# Patient Record
Sex: FEMALE | Race: WHITE | URBAN - NONMETROPOLITAN AREA
[De-identification: names, ages, dates, MRNs, and addresses within clinical notes are randomized per-mention and may not be internally consistent; named-entity substitution may affect disease eponyms.]

---

## 2017-01-20 ENCOUNTER — DOCTOR'S OFFICE (OUTPATIENT)
Dept: URBAN - NONMETROPOLITAN AREA CLINIC 1 | Facility: CLINIC | Age: 42
Setting detail: OPHTHALMOLOGY
End: 2017-01-20
Payer: COMMERCIAL

## 2017-01-20 DIAGNOSIS — Z96.1: ICD-10-CM

## 2017-01-20 PROCEDURE — DISABILITY BUREAU OF DISABILITY REPORT: Performed by: OPHTHALMOLOGY

## 2017-01-30 ENCOUNTER — DOCTOR'S OFFICE (OUTPATIENT)
Dept: URBAN - NONMETROPOLITAN AREA CLINIC 1 | Facility: CLINIC | Age: 42
Setting detail: OPHTHALMOLOGY
End: 2017-01-30

## 2017-01-30 DIAGNOSIS — Z96.1: ICD-10-CM

## 2017-01-30 PROCEDURE — DISABILITY BUREAU OF DISABILITY REPORT: Performed by: OPHTHALMOLOGY

## 2017-02-08 ENCOUNTER — ALLSCRIPTS OFFICE VISIT (OUTPATIENT)
Dept: FAMILY MEDICINE CLINIC | Facility: CLINIC | Age: 42
End: 2017-02-08
Payer: COMMERCIAL

## 2017-02-08 DIAGNOSIS — K52.9 NONINFECTIVE GASTROENTERITIS AND COLITIS: ICD-10-CM

## 2017-02-08 DIAGNOSIS — E03.9 HYPOTHYROIDISM: ICD-10-CM

## 2017-02-08 DIAGNOSIS — K50.10 CROHN'S DISEASE OF LARGE INTESTINE WITHOUT COMPLICATION (HCC): ICD-10-CM

## 2017-02-08 PROCEDURE — 99213 OFFICE O/P EST LOW 20 MIN: CPT | Performed by: FAMILY MEDICINE

## 2017-02-22 ENCOUNTER — TRANSCRIBE ORDERS (OUTPATIENT)
Dept: LAB | Facility: MEDICAL CENTER | Age: 42
End: 2017-02-22

## 2017-02-22 ENCOUNTER — APPOINTMENT (OUTPATIENT)
Dept: LAB | Facility: MEDICAL CENTER | Age: 42
End: 2017-02-22
Payer: COMMERCIAL

## 2017-02-22 DIAGNOSIS — E03.9 HYPOTHYROIDISM: ICD-10-CM

## 2017-02-22 DIAGNOSIS — K50.10 CROHN'S DISEASE OF LARGE INTESTINE WITHOUT COMPLICATION (HCC): ICD-10-CM

## 2017-02-22 DIAGNOSIS — K52.9 NONINFECTIVE GASTROENTERITIS AND COLITIS: ICD-10-CM

## 2017-02-22 LAB
25(OH)D3 SERPL-MCNC: 19.8 NG/ML (ref 30–100)
ALBUMIN SERPL BCP-MCNC: 4.1 G/DL (ref 3.5–5)
ALP SERPL-CCNC: 64 U/L (ref 46–116)
ALT SERPL W P-5'-P-CCNC: 40 U/L (ref 12–78)
ANION GAP SERPL CALCULATED.3IONS-SCNC: 7 MMOL/L (ref 4–13)
AST SERPL W P-5'-P-CCNC: 20 U/L (ref 5–45)
BASOPHILS # BLD AUTO: 0.04 THOUSANDS/ΜL (ref 0–0.1)
BASOPHILS NFR BLD AUTO: 1 % (ref 0–1)
BILIRUB SERPL-MCNC: 0.2 MG/DL (ref 0.2–1)
BUN SERPL-MCNC: 14 MG/DL (ref 5–25)
CALCIUM SERPL-MCNC: 9.9 MG/DL (ref 8.3–10.1)
CHLORIDE SERPL-SCNC: 103 MMOL/L (ref 100–108)
CO2 SERPL-SCNC: 28 MMOL/L (ref 21–32)
CREAT SERPL-MCNC: 0.68 MG/DL (ref 0.6–1.3)
EOSINOPHIL # BLD AUTO: 0.03 THOUSAND/ΜL (ref 0–0.61)
EOSINOPHIL NFR BLD AUTO: 0 % (ref 0–6)
ERYTHROCYTE [DISTWIDTH] IN BLOOD BY AUTOMATED COUNT: 12.6 % (ref 11.6–15.1)
GFR SERPL CREATININE-BSD FRML MDRD: >60 ML/MIN/1.73SQ M
GLUCOSE SERPL-MCNC: 89 MG/DL (ref 65–140)
HCT VFR BLD AUTO: 44.5 % (ref 34.8–46.1)
HGB BLD-MCNC: 15.1 G/DL (ref 11.5–15.4)
IRON SERPL-MCNC: 47 UG/DL (ref 50–170)
LYMPHOCYTES # BLD AUTO: 1.52 THOUSANDS/ΜL (ref 0.6–4.47)
LYMPHOCYTES NFR BLD AUTO: 21 % (ref 14–44)
MCH RBC QN AUTO: 32.1 PG (ref 26.8–34.3)
MCHC RBC AUTO-ENTMCNC: 33.9 G/DL (ref 31.4–37.4)
MCV RBC AUTO: 95 FL (ref 82–98)
MONOCYTES # BLD AUTO: 0.53 THOUSAND/ΜL (ref 0.17–1.22)
MONOCYTES NFR BLD AUTO: 7 % (ref 4–12)
NEUTROPHILS # BLD AUTO: 5.07 THOUSANDS/ΜL (ref 1.85–7.62)
NEUTS SEG NFR BLD AUTO: 71 % (ref 43–75)
NRBC BLD AUTO-RTO: 0 /100 WBCS
PLATELET # BLD AUTO: 367 THOUSANDS/UL (ref 149–390)
PMV BLD AUTO: 10.1 FL (ref 8.9–12.7)
POTASSIUM SERPL-SCNC: 4.5 MMOL/L (ref 3.5–5.3)
PROT SERPL-MCNC: 8 G/DL (ref 6.4–8.2)
RBC # BLD AUTO: 4.71 MILLION/UL (ref 3.81–5.12)
SODIUM SERPL-SCNC: 138 MMOL/L (ref 136–145)
T4 FREE SERPL-MCNC: 1.07 NG/DL (ref 0.76–1.46)
TSH SERPL DL<=0.05 MIU/L-ACNC: 0.07 UIU/ML (ref 0.36–3.74)
VIT B12 SERPL-MCNC: 451 PG/ML (ref 100–900)
WBC # BLD AUTO: 7.21 THOUSAND/UL (ref 4.31–10.16)

## 2017-02-22 PROCEDURE — 80053 COMPREHEN METABOLIC PANEL: CPT

## 2017-02-22 PROCEDURE — 82306 VITAMIN D 25 HYDROXY: CPT

## 2017-02-22 PROCEDURE — 83540 ASSAY OF IRON: CPT

## 2017-02-22 PROCEDURE — 84443 ASSAY THYROID STIM HORMONE: CPT

## 2017-02-22 PROCEDURE — 36415 COLL VENOUS BLD VENIPUNCTURE: CPT

## 2017-02-22 PROCEDURE — 82607 VITAMIN B-12: CPT

## 2017-02-22 PROCEDURE — 84439 ASSAY OF FREE THYROXINE: CPT

## 2017-02-22 PROCEDURE — 85025 COMPLETE CBC W/AUTO DIFF WBC: CPT

## 2017-03-20 ENCOUNTER — GENERIC CONVERSION - ENCOUNTER (OUTPATIENT)
Dept: OTHER | Facility: OTHER | Age: 42
End: 2017-03-20

## 2017-04-25 ENCOUNTER — DOCTOR'S OFFICE (OUTPATIENT)
Dept: URBAN - NONMETROPOLITAN AREA CLINIC 1 | Facility: CLINIC | Age: 42
Setting detail: OPHTHALMOLOGY
End: 2017-04-25
Payer: COMMERCIAL

## 2017-04-25 DIAGNOSIS — H25.13: ICD-10-CM

## 2017-04-25 DIAGNOSIS — H46.8: ICD-10-CM

## 2017-04-25 PROCEDURE — 92133 CPTRZD OPH DX IMG PST SGM ON: CPT | Performed by: OPHTHALMOLOGY

## 2017-04-25 PROCEDURE — 92014 COMPRE OPH EXAM EST PT 1/>: CPT | Performed by: OPHTHALMOLOGY

## 2017-04-25 ASSESSMENT — REFRACTION_MANIFEST
OS_VA3: 20/
OD_VA1: 20/
OD_VA2: 20/
OS_VA2: 20/
OU_VA: 20/
OD_VA3: 20/
OS_VA2: 20/
OS_VA3: 20/
OS_VA3: 20/
OU_VA: 20/
OD_VA1: 20/
OD_VA2: 20/
OD_VA2: 20/
OD_VA1: 20/
OD_VA3: 20/
OS_VA1: 20/
OS_VA1: 20/
OD_VA3: 20/
OU_VA: 20/
OS_VA1: 20/
OS_VA2: 20/

## 2017-04-25 ASSESSMENT — CONFRONTATIONAL VISUAL FIELD TEST (CVF)
OS_FINDINGS: FULL
OD_FINDINGS: FULL

## 2017-04-25 ASSESSMENT — VISUAL ACUITY
OD_BCVA: 20/20
OS_BCVA: 20/20

## 2017-04-25 ASSESSMENT — REFRACTION_CURRENTRX
OD_OVR_VA: 20/
OS_OVR_VA: 20/
OS_OVR_VA: 20/
OD_OVR_VA: 20/
OS_OVR_VA: 20/
OD_OVR_VA: 20/

## 2017-05-03 ENCOUNTER — GENERIC CONVERSION - ENCOUNTER (OUTPATIENT)
Dept: OTHER | Facility: OTHER | Age: 42
End: 2017-05-03

## 2017-05-10 RX ORDER — LIOTHYRONINE SODIUM 5 UG/1
5 TABLET ORAL 2 TIMES DAILY
Status: ON HOLD | COMMUNITY
End: 2019-02-12 | Stop reason: ALTCHOICE

## 2017-05-10 RX ORDER — LORAZEPAM 1 MG/1
1 TABLET ORAL
COMMUNITY
End: 2018-02-12 | Stop reason: ALTCHOICE

## 2017-05-10 RX ORDER — DIPHENHYDRAMINE HCL 25 MG
25 CAPSULE ORAL EVERY 4 HOURS PRN
Status: ON HOLD | COMMUNITY
End: 2017-12-12

## 2017-05-10 RX ORDER — MESALAMINE 800 MG/1
800 TABLET, DELAYED RELEASE ORAL 3 TIMES DAILY
Status: ON HOLD | COMMUNITY
End: 2017-05-12 | Stop reason: ALTCHOICE

## 2017-05-11 ENCOUNTER — ANESTHESIA EVENT (OUTPATIENT)
Dept: PERIOP | Facility: HOSPITAL | Age: 42
End: 2017-05-11
Payer: COMMERCIAL

## 2017-05-12 ENCOUNTER — HOSPITAL ENCOUNTER (OUTPATIENT)
Facility: HOSPITAL | Age: 42
Setting detail: OUTPATIENT SURGERY
Discharge: HOME | End: 2017-05-12
Attending: ORTHOPAEDIC SURGERY | Admitting: ORTHOPAEDIC SURGERY
Payer: COMMERCIAL

## 2017-05-12 ENCOUNTER — ANESTHESIA (OUTPATIENT)
Dept: PERIOP | Facility: HOSPITAL | Age: 42
End: 2017-05-12
Payer: COMMERCIAL

## 2017-05-12 VITALS
TEMPERATURE: 99 F | DIASTOLIC BLOOD PRESSURE: 73 MMHG | WEIGHT: 141 LBS | HEART RATE: 79 BPM | SYSTOLIC BLOOD PRESSURE: 109 MMHG | OXYGEN SATURATION: 99 % | BODY MASS INDEX: 23.49 KG/M2 | RESPIRATION RATE: 16 BRPM | HEIGHT: 65 IN

## 2017-05-12 PROBLEM — M22.42 CHONDROMALACIA PATELLAE, LEFT KNEE: Status: ACTIVE | Noted: 2017-05-12

## 2017-05-12 PROCEDURE — A9270 NON-COVERED ITEM OR SERVICE: HCPCS | Performed by: ANESTHESIOLOGY

## 2017-05-12 RX ORDER — METOCLOPRAMIDE HYDROCHLORIDE 5 MG/ML
10 INJECTION INTRAMUSCULAR; INTRAVENOUS ONCE
Status: DISCONTINUED | OUTPATIENT
Start: 2017-05-12 | End: 2017-05-12 | Stop reason: HOSPADM

## 2017-05-12 RX ORDER — LEVOTHYROXINE SODIUM 0.07 MG/1
50 TABLET ORAL DAILY
Status: ON HOLD | COMMUNITY
End: 2017-08-25 | Stop reason: CLARIF

## 2017-05-12 RX ORDER — BUPIVACAINE HYDROCHLORIDE AND EPINEPHRINE 2.5; 5 MG/ML; UG/ML
INJECTION, SOLUTION EPIDURAL; INFILTRATION; INTRACAUDAL; PERINEURAL AS NEEDED
Status: DISCONTINUED | OUTPATIENT
Start: 2017-05-12 | End: 2017-05-12 | Stop reason: HOSPADM

## 2017-05-12 RX ORDER — PROPOFOL 10 MG/ML
INJECTION, EMULSION INTRAVENOUS AS NEEDED
Status: DISCONTINUED | OUTPATIENT
Start: 2017-05-12 | End: 2017-05-12 | Stop reason: SURG

## 2017-05-12 RX ORDER — KETOROLAC TROMETHAMINE 30 MG/ML
INJECTION, SOLUTION INTRAMUSCULAR; INTRAVENOUS AS NEEDED
Status: DISCONTINUED | OUTPATIENT
Start: 2017-05-12 | End: 2017-05-12 | Stop reason: SURG

## 2017-05-12 RX ORDER — MEPERIDINE HYDROCHLORIDE 25 MG/ML
25 INJECTION INTRAMUSCULAR; INTRAVENOUS; SUBCUTANEOUS AS NEEDED
Status: DISCONTINUED | OUTPATIENT
Start: 2017-05-12 | End: 2017-05-12 | Stop reason: HOSPADM

## 2017-05-12 RX ORDER — FENTANYL CITRATE/PF 50 MCG/ML
25 SYRINGE (ML) INJECTION
Status: COMPLETED | OUTPATIENT
Start: 2017-05-12 | End: 2017-05-12

## 2017-05-12 RX ORDER — MAGNESIUM HYDROXIDE 1200 MG/15ML
LIQUID ORAL AS NEEDED
Status: DISCONTINUED | OUTPATIENT
Start: 2017-05-12 | End: 2017-05-12 | Stop reason: HOSPADM

## 2017-05-12 RX ORDER — LIDOCAINE HYDROCHLORIDE AND EPINEPHRINE 10; 10 MG/ML; UG/ML
INJECTION, SOLUTION INFILTRATION; PERINEURAL AS NEEDED
Status: DISCONTINUED | OUTPATIENT
Start: 2017-05-12 | End: 2017-05-12 | Stop reason: HOSPADM

## 2017-05-12 RX ORDER — FENTANYL CITRATE 50 UG/ML
INJECTION, SOLUTION INTRAMUSCULAR; INTRAVENOUS AS NEEDED
Status: DISCONTINUED | OUTPATIENT
Start: 2017-05-12 | End: 2017-05-12 | Stop reason: SURG

## 2017-05-12 RX ORDER — DIPHENHYDRAMINE HYDROCHLORIDE 50 MG/ML
INJECTION INTRAMUSCULAR; INTRAVENOUS AS NEEDED
Status: DISCONTINUED | OUTPATIENT
Start: 2017-05-12 | End: 2017-05-12 | Stop reason: SURG

## 2017-05-12 RX ORDER — PREDNISONE 1 MG/1
5 TABLET ORAL DAILY
COMMUNITY
End: 2017-08-30 | Stop reason: HOSPADM

## 2017-05-12 RX ORDER — MIDAZOLAM HYDROCHLORIDE 1 MG/ML
INJECTION INTRAMUSCULAR; INTRAVENOUS AS NEEDED
Status: DISCONTINUED | OUTPATIENT
Start: 2017-05-12 | End: 2017-05-12 | Stop reason: SURG

## 2017-05-12 RX ORDER — SODIUM CHLORIDE, SODIUM LACTATE, POTASSIUM CHLORIDE, CALCIUM CHLORIDE 600; 310; 30; 20 MG/100ML; MG/100ML; MG/100ML; MG/100ML
125 INJECTION, SOLUTION INTRAVENOUS CONTINUOUS
Status: DISCONTINUED | OUTPATIENT
Start: 2017-05-12 | End: 2017-05-12 | Stop reason: HOSPADM

## 2017-05-12 RX ORDER — BUPIVACAINE HYDROCHLORIDE 5 MG/ML
INJECTION, SOLUTION PERINEURAL AS NEEDED
Status: DISCONTINUED | OUTPATIENT
Start: 2017-05-12 | End: 2017-05-12 | Stop reason: HOSPADM

## 2017-05-12 RX ORDER — ONDANSETRON 2 MG/ML
INJECTION INTRAMUSCULAR; INTRAVENOUS AS NEEDED
Status: DISCONTINUED | OUTPATIENT
Start: 2017-05-12 | End: 2017-05-12 | Stop reason: SURG

## 2017-05-12 RX ADMIN — FENTANYL CITRATE 25 MCG: 50 INJECTION INTRAMUSCULAR; INTRAVENOUS at 16:47

## 2017-05-12 RX ADMIN — FENTANYL CITRATE 25 MCG: 50 INJECTION INTRAMUSCULAR; INTRAVENOUS at 16:50

## 2017-05-12 RX ADMIN — FENTANYL CITRATE 25 MCG: 50 INJECTION INTRAMUSCULAR; INTRAVENOUS at 16:56

## 2017-05-12 RX ADMIN — FENTANYL CITRATE 25 MCG: 50 INJECTION INTRAMUSCULAR; INTRAVENOUS at 16:53

## 2017-05-12 RX ADMIN — DEXAMETHASONE SODIUM PHOSPHATE 10 MG: 10 INJECTION INTRAMUSCULAR; INTRAVENOUS at 15:41

## 2017-05-12 RX ADMIN — HYDROMORPHONE HYDROCHLORIDE 0.5 MG: 1 INJECTION, SOLUTION INTRAMUSCULAR; INTRAVENOUS; SUBCUTANEOUS at 17:00

## 2017-05-12 RX ADMIN — CEFAZOLIN SODIUM 2000 MG: 2 SOLUTION INTRAVENOUS at 15:45

## 2017-05-12 RX ADMIN — HYDROMORPHONE HYDROCHLORIDE 0.5 MG: 1 INJECTION, SOLUTION INTRAMUSCULAR; INTRAVENOUS; SUBCUTANEOUS at 17:05

## 2017-05-12 RX ADMIN — FENTANYL CITRATE 50 MCG: 50 INJECTION, SOLUTION INTRAMUSCULAR; INTRAVENOUS at 16:13

## 2017-05-12 RX ADMIN — DIPHENHYDRAMINE HYDROCHLORIDE 25 MG: 50 INJECTION, SOLUTION INTRAMUSCULAR; INTRAVENOUS at 15:41

## 2017-05-12 RX ADMIN — KETOROLAC TROMETHAMINE 30 MG: 30 INJECTION, SOLUTION INTRAMUSCULAR at 16:20

## 2017-05-12 RX ADMIN — ONDANSETRON HYDROCHLORIDE 4 MG: 2 INJECTION, SOLUTION INTRAVENOUS at 16:16

## 2017-05-12 RX ADMIN — SODIUM CHLORIDE, SODIUM LACTATE, POTASSIUM CHLORIDE, AND CALCIUM CHLORIDE 125 ML/HR: .6; .31; .03; .02 INJECTION, SOLUTION INTRAVENOUS at 14:49

## 2017-05-12 RX ADMIN — MIDAZOLAM HYDROCHLORIDE 2 MG: 1 INJECTION, SOLUTION INTRAMUSCULAR; INTRAVENOUS at 15:43

## 2017-05-12 RX ADMIN — PROPOFOL 150 MG: 10 INJECTION, EMULSION INTRAVENOUS at 15:47

## 2017-05-15 ENCOUNTER — APPOINTMENT (OUTPATIENT)
Dept: PHYSICAL THERAPY | Facility: CLINIC | Age: 42
End: 2017-05-15
Payer: COMMERCIAL

## 2017-05-15 PROCEDURE — 97140 MANUAL THERAPY 1/> REGIONS: CPT

## 2017-05-15 PROCEDURE — 97530 THERAPEUTIC ACTIVITIES: CPT

## 2017-05-15 PROCEDURE — 97014 ELECTRIC STIMULATION THERAPY: CPT

## 2017-05-15 PROCEDURE — 97162 PT EVAL MOD COMPLEX 30 MIN: CPT

## 2017-05-15 PROCEDURE — G0283 ELEC STIM OTHER THAN WOUND: HCPCS

## 2017-05-17 ENCOUNTER — APPOINTMENT (OUTPATIENT)
Dept: PHYSICAL THERAPY | Facility: CLINIC | Age: 42
End: 2017-05-17
Payer: COMMERCIAL

## 2017-05-19 ENCOUNTER — APPOINTMENT (OUTPATIENT)
Dept: PHYSICAL THERAPY | Facility: CLINIC | Age: 42
End: 2017-05-19
Payer: COMMERCIAL

## 2017-05-19 PROCEDURE — 97110 THERAPEUTIC EXERCISES: CPT

## 2017-05-19 PROCEDURE — 97010 HOT OR COLD PACKS THERAPY: CPT

## 2017-05-19 PROCEDURE — 97112 NEUROMUSCULAR REEDUCATION: CPT

## 2017-05-19 PROCEDURE — 97140 MANUAL THERAPY 1/> REGIONS: CPT

## 2017-05-22 ENCOUNTER — APPOINTMENT (OUTPATIENT)
Dept: PHYSICAL THERAPY | Facility: CLINIC | Age: 42
End: 2017-05-22
Payer: COMMERCIAL

## 2017-05-22 PROCEDURE — 97140 MANUAL THERAPY 1/> REGIONS: CPT

## 2017-05-22 PROCEDURE — 97014 ELECTRIC STIMULATION THERAPY: CPT

## 2017-05-22 PROCEDURE — 97110 THERAPEUTIC EXERCISES: CPT

## 2017-05-22 PROCEDURE — G0283 ELEC STIM OTHER THAN WOUND: HCPCS

## 2017-05-22 PROCEDURE — 97112 NEUROMUSCULAR REEDUCATION: CPT

## 2017-05-30 ENCOUNTER — GENERIC CONVERSION - ENCOUNTER (OUTPATIENT)
Dept: OTHER | Facility: OTHER | Age: 42
End: 2017-05-30

## 2017-06-26 ENCOUNTER — GENERIC CONVERSION - ENCOUNTER (OUTPATIENT)
Dept: OTHER | Facility: OTHER | Age: 42
End: 2017-06-26

## 2017-08-24 ENCOUNTER — HOSPITAL ENCOUNTER (INPATIENT)
Facility: HOSPITAL | Age: 42
LOS: 6 days | Discharge: HOME | DRG: 871 | End: 2017-08-30
Attending: FAMILY MEDICINE | Admitting: FAMILY MEDICINE
Payer: COMMERCIAL

## 2017-08-24 ENCOUNTER — APPOINTMENT (EMERGENCY)
Dept: RADIOLOGY | Facility: HOSPITAL | Age: 42
DRG: 871 | End: 2017-08-24
Payer: COMMERCIAL

## 2017-08-24 ENCOUNTER — APPOINTMENT (EMERGENCY)
Dept: CT IMAGING | Facility: HOSPITAL | Age: 42
DRG: 871 | End: 2017-08-24
Payer: COMMERCIAL

## 2017-08-24 DIAGNOSIS — A41.9 SEPSIS (HCC): ICD-10-CM

## 2017-08-24 DIAGNOSIS — K52.9 COLITIS, ACUTE: ICD-10-CM

## 2017-08-24 DIAGNOSIS — E87.70 VOLUME OVERLOAD: ICD-10-CM

## 2017-08-24 DIAGNOSIS — R65.20 SEVERE SEPSIS (HCC): Primary | ICD-10-CM

## 2017-08-24 DIAGNOSIS — I95.9 HYPOTENSION: ICD-10-CM

## 2017-08-24 DIAGNOSIS — R09.02 HYPOXIA: ICD-10-CM

## 2017-08-24 DIAGNOSIS — A41.9 SEVERE SEPSIS (HCC): Primary | ICD-10-CM

## 2017-08-24 LAB
ALBUMIN SERPL BCP-MCNC: 3.4 G/DL (ref 3.5–5)
ALP SERPL-CCNC: 56 U/L (ref 46–116)
ALT SERPL W P-5'-P-CCNC: 25 U/L (ref 12–78)
ANION GAP SERPL CALCULATED.3IONS-SCNC: 8 MMOL/L (ref 4–13)
ANISOCYTOSIS BLD QL SMEAR: PRESENT
APTT PPP: 22 SECONDS (ref 23–35)
AST SERPL W P-5'-P-CCNC: 26 U/L (ref 5–45)
BASOPHILS # BLD MANUAL: 0 THOUSAND/UL (ref 0–0.1)
BASOPHILS NFR MAR MANUAL: 0 % (ref 0–1)
BILIRUB SERPL-MCNC: 0.4 MG/DL (ref 0.2–1)
BILIRUB UR QL STRIP: NEGATIVE
BUN SERPL-MCNC: 8 MG/DL (ref 5–25)
CALCIUM SERPL-MCNC: 8.4 MG/DL (ref 8.3–10.1)
CHLORIDE SERPL-SCNC: 105 MMOL/L (ref 100–108)
CLARITY UR: CLEAR
CO2 SERPL-SCNC: 27 MMOL/L (ref 21–32)
COLOR UR: NORMAL
CREAT SERPL-MCNC: 0.71 MG/DL (ref 0.6–1.3)
EOSINOPHIL # BLD MANUAL: 0.21 THOUSAND/UL (ref 0–0.4)
EOSINOPHIL NFR BLD MANUAL: 2 % (ref 0–6)
ERYTHROCYTE [DISTWIDTH] IN BLOOD BY AUTOMATED COUNT: 12.8 % (ref 11.6–15.1)
GFR SERPL CREATININE-BSD FRML MDRD: 104 ML/MIN/1.73SQ M
GLUCOSE SERPL-MCNC: 115 MG/DL (ref 65–140)
GLUCOSE UR STRIP-MCNC: NEGATIVE MG/DL
HCT VFR BLD AUTO: 39.6 % (ref 34.8–46.1)
HGB BLD-MCNC: 13.5 G/DL (ref 11.5–15.4)
HGB UR QL STRIP.AUTO: NEGATIVE
INR PPP: 0.93 (ref 0.86–1.16)
KETONES UR STRIP-MCNC: NEGATIVE MG/DL
LACTATE SERPL-SCNC: 0.9 MMOL/L (ref 0.5–2)
LACTATE SERPL-SCNC: 2.3 MMOL/L (ref 0.5–2)
LEUKOCYTE ESTERASE UR QL STRIP: NEGATIVE
LIPASE SERPL-CCNC: 84 U/L (ref 73–393)
LYMPHOCYTES # BLD AUTO: 0.42 THOUSAND/UL (ref 0.6–4.47)
LYMPHOCYTES # BLD AUTO: 4 % (ref 14–44)
MCH RBC QN AUTO: 32.2 PG (ref 26.8–34.3)
MCHC RBC AUTO-ENTMCNC: 34.1 G/DL (ref 31.4–37.4)
MCV RBC AUTO: 95 FL (ref 82–98)
MONOCYTES # BLD AUTO: 0.21 THOUSAND/UL (ref 0–1.22)
MONOCYTES NFR BLD: 2 % (ref 4–12)
NEUTROPHILS # BLD MANUAL: 9.71 THOUSAND/UL (ref 1.85–7.62)
NEUTS SEG NFR BLD AUTO: 92 % (ref 43–75)
NITRITE UR QL STRIP: NEGATIVE
PH UR STRIP.AUTO: 6 [PH] (ref 4.5–8)
PLATELET # BLD AUTO: 369 THOUSANDS/UL (ref 149–390)
PLATELET BLD QL SMEAR: ADEQUATE
PMV BLD AUTO: 9.1 FL (ref 8.9–12.7)
POTASSIUM SERPL-SCNC: 3.3 MMOL/L (ref 3.5–5.3)
PROT SERPL-MCNC: 6.8 G/DL (ref 6.4–8.2)
PROT UR STRIP-MCNC: NEGATIVE MG/DL
PROTHROMBIN TIME: 12.4 SECONDS (ref 12.1–14.4)
RBC # BLD AUTO: 4.19 MILLION/UL (ref 3.81–5.12)
SODIUM SERPL-SCNC: 140 MMOL/L (ref 136–145)
SP GR UR STRIP.AUTO: 1.01 (ref 1–1.03)
TOTAL CELLS COUNTED SPEC: 100
TROPONIN I SERPL-MCNC: <0.02 NG/ML
TSH SERPL DL<=0.05 MIU/L-ACNC: 0.42 UIU/ML (ref 0.36–3.74)
UROBILINOGEN UR QL STRIP.AUTO: 0.2 E.U./DL
WBC # BLD AUTO: 10.55 THOUSAND/UL (ref 4.31–10.16)

## 2017-08-24 PROCEDURE — 96365 THER/PROPH/DIAG IV INF INIT: CPT

## 2017-08-24 PROCEDURE — 83605 ASSAY OF LACTIC ACID: CPT | Performed by: FAMILY MEDICINE

## 2017-08-24 PROCEDURE — 71010 HB CHEST X-RAY 1 VIEW FRONTAL: CPT | Performed by: FAMILY MEDICINE

## 2017-08-24 PROCEDURE — 84484 ASSAY OF TROPONIN QUANT: CPT | Performed by: FAMILY MEDICINE

## 2017-08-24 PROCEDURE — 96360 HYDRATION IV INFUSION INIT: CPT

## 2017-08-24 PROCEDURE — 85610 PROTHROMBIN TIME: CPT | Performed by: FAMILY MEDICINE

## 2017-08-24 PROCEDURE — 85027 COMPLETE CBC AUTOMATED: CPT | Performed by: FAMILY MEDICINE

## 2017-08-24 PROCEDURE — 87040 BLOOD CULTURE FOR BACTERIA: CPT | Performed by: FAMILY MEDICINE

## 2017-08-24 PROCEDURE — 96361 HYDRATE IV INFUSION ADD-ON: CPT

## 2017-08-24 PROCEDURE — 85007 BL SMEAR W/DIFF WBC COUNT: CPT | Performed by: FAMILY MEDICINE

## 2017-08-24 PROCEDURE — 70450 CT HEAD/BRAIN W/O DYE: CPT

## 2017-08-24 PROCEDURE — 36415 COLL VENOUS BLD VENIPUNCTURE: CPT

## 2017-08-24 PROCEDURE — 96375 TX/PRO/DX INJ NEW DRUG ADDON: CPT

## 2017-08-24 PROCEDURE — 83690 ASSAY OF LIPASE: CPT | Performed by: PHYSICIAN ASSISTANT

## 2017-08-24 PROCEDURE — 80053 COMPREHEN METABOLIC PANEL: CPT | Performed by: FAMILY MEDICINE

## 2017-08-24 PROCEDURE — 74177 CT ABD & PELVIS W/CONTRAST: CPT

## 2017-08-24 PROCEDURE — 84443 ASSAY THYROID STIM HORMONE: CPT | Performed by: PHYSICIAN ASSISTANT

## 2017-08-24 PROCEDURE — 85730 THROMBOPLASTIN TIME PARTIAL: CPT | Performed by: FAMILY MEDICINE

## 2017-08-24 PROCEDURE — 93005 ELECTROCARDIOGRAM TRACING: CPT | Performed by: FAMILY MEDICINE

## 2017-08-24 PROCEDURE — 81003 URINALYSIS AUTO W/O SCOPE: CPT | Performed by: FAMILY MEDICINE

## 2017-08-24 RX ORDER — DIPHENHYDRAMINE HYDROCHLORIDE 50 MG/ML
25 INJECTION INTRAMUSCULAR; INTRAVENOUS ONCE
Status: COMPLETED | OUTPATIENT
Start: 2017-08-24 | End: 2017-08-24

## 2017-08-24 RX ORDER — ONDANSETRON 2 MG/ML
4 INJECTION INTRAMUSCULAR; INTRAVENOUS ONCE
Status: COMPLETED | OUTPATIENT
Start: 2017-08-24 | End: 2017-08-24

## 2017-08-24 RX ORDER — METOCLOPRAMIDE HYDROCHLORIDE 5 MG/ML
10 INJECTION INTRAMUSCULAR; INTRAVENOUS ONCE
Status: COMPLETED | OUTPATIENT
Start: 2017-08-24 | End: 2017-08-24

## 2017-08-24 RX ORDER — PIPERACILLIN SODIUM, TAZOBACTAM SODIUM 3; .375 G/15ML; G/15ML
INJECTION, POWDER, LYOPHILIZED, FOR SOLUTION INTRAVENOUS
Status: COMPLETED
Start: 2017-08-24 | End: 2017-08-24

## 2017-08-24 RX ORDER — ACETAMINOPHEN 325 MG/1
650 TABLET ORAL ONCE
Status: COMPLETED | OUTPATIENT
Start: 2017-08-24 | End: 2017-08-24

## 2017-08-24 RX ADMIN — IOHEXOL 100 ML: 350 INJECTION, SOLUTION INTRAVENOUS at 21:22

## 2017-08-24 RX ADMIN — METOCLOPRAMIDE 10 MG: 5 INJECTION, SOLUTION INTRAMUSCULAR; INTRAVENOUS at 20:30

## 2017-08-24 RX ADMIN — ACETAMINOPHEN 650 MG: 325 TABLET, FILM COATED ORAL at 18:50

## 2017-08-24 RX ADMIN — PIPERACILLIN AND TAZOBACTAM 3.38 G: 3; .375 INJECTION, POWDER, FOR SOLUTION INTRAVENOUS at 19:49

## 2017-08-24 RX ADMIN — HYDROCORTISONE SODIUM SUCCINATE 100 MG: 100 INJECTION, POWDER, FOR SOLUTION INTRAMUSCULAR; INTRAVENOUS at 23:30

## 2017-08-24 RX ADMIN — SODIUM CHLORIDE 2000 ML: 0.9 INJECTION, SOLUTION INTRAVENOUS at 18:49

## 2017-08-24 RX ADMIN — SODIUM CHLORIDE 1000 ML: 0.9 INJECTION, SOLUTION INTRAVENOUS at 19:50

## 2017-08-24 RX ADMIN — DIPHENHYDRAMINE HYDROCHLORIDE 25 MG: 50 INJECTION, SOLUTION INTRAMUSCULAR; INTRAVENOUS at 20:30

## 2017-08-24 RX ADMIN — HYDROMORPHONE HYDROCHLORIDE 1 MG: 1 INJECTION, SOLUTION INTRAMUSCULAR; INTRAVENOUS; SUBCUTANEOUS at 18:50

## 2017-08-24 RX ADMIN — ONDANSETRON 4 MG: 2 INJECTION INTRAMUSCULAR; INTRAVENOUS at 18:50

## 2017-08-25 ENCOUNTER — APPOINTMENT (INPATIENT)
Dept: RADIOLOGY | Facility: HOSPITAL | Age: 42
DRG: 871 | End: 2017-08-25
Payer: COMMERCIAL

## 2017-08-25 ENCOUNTER — APPOINTMENT (INPATIENT)
Dept: NON INVASIVE DIAGNOSTICS | Facility: HOSPITAL | Age: 42
DRG: 871 | End: 2017-08-25
Payer: COMMERCIAL

## 2017-08-25 PROBLEM — R65.21 SEPTIC SHOCK (HCC): Status: ACTIVE | Noted: 2017-08-25

## 2017-08-25 PROBLEM — R09.89 THROAT TIGHTNESS: Status: ACTIVE | Noted: 2017-08-25

## 2017-08-25 PROBLEM — K52.9 COLITIS: Status: ACTIVE | Noted: 2017-08-25

## 2017-08-25 PROBLEM — R51.9 INTRACTABLE HEADACHE: Status: ACTIVE | Noted: 2017-08-25

## 2017-08-25 PROBLEM — A41.9 SEPTIC SHOCK (HCC): Status: ACTIVE | Noted: 2017-08-25

## 2017-08-25 PROBLEM — R00.1 BRADYCARDIA: Status: ACTIVE | Noted: 2017-08-25

## 2017-08-25 PROBLEM — E55.9 VITAMIN D DEFICIENCY: Status: ACTIVE | Noted: 2017-08-25

## 2017-08-25 PROBLEM — E87.20 LACTIC ACIDOSIS: Status: ACTIVE | Noted: 2017-08-25

## 2017-08-25 PROBLEM — E87.6 HYPOKALEMIA: Status: ACTIVE | Noted: 2017-08-25

## 2017-08-25 PROBLEM — A41.9 SEPSIS (HCC): Status: ACTIVE | Noted: 2017-08-25

## 2017-08-25 PROBLEM — K51.90 ULCERATIVE COLITIS (HCC): Status: ACTIVE | Noted: 2017-08-25

## 2017-08-25 PROBLEM — K92.2 GASTROINTESTINAL HEMORRHAGE: Status: ACTIVE | Noted: 2017-08-25

## 2017-08-25 PROBLEM — R65.20 SEVERE SEPSIS (HCC): Status: ACTIVE | Noted: 2017-08-25

## 2017-08-25 PROBLEM — M22.42 CHONDROMALACIA PATELLAE, LEFT KNEE: Status: RESOLVED | Noted: 2017-05-12 | Resolved: 2017-08-25

## 2017-08-25 PROBLEM — I95.9 HYPOTENSION: Status: ACTIVE | Noted: 2017-08-25

## 2017-08-25 PROBLEM — R10.9 INTRACTABLE ABDOMINAL PAIN: Status: ACTIVE | Noted: 2017-08-25

## 2017-08-25 PROBLEM — E87.2 LACTIC ACIDOSIS: Status: ACTIVE | Noted: 2017-08-25

## 2017-08-25 LAB
ANION GAP SERPL CALCULATED.3IONS-SCNC: 6 MMOL/L (ref 4–13)
BASOPHILS # BLD MANUAL: 0 THOUSAND/UL (ref 0–0.1)
BASOPHILS NFR MAR MANUAL: 0 % (ref 0–1)
BUN SERPL-MCNC: 6 MG/DL (ref 5–25)
CALCIUM SERPL-MCNC: 7 MG/DL (ref 8.3–10.1)
CHLORIDE SERPL-SCNC: 110 MMOL/L (ref 100–108)
CO2 SERPL-SCNC: 24 MMOL/L (ref 21–32)
CREAT SERPL-MCNC: 0.53 MG/DL (ref 0.6–1.3)
EOSINOPHIL # BLD MANUAL: 0 THOUSAND/UL (ref 0–0.4)
EOSINOPHIL NFR BLD MANUAL: 0 % (ref 0–6)
ERYTHROCYTE [DISTWIDTH] IN BLOOD BY AUTOMATED COUNT: 13.2 % (ref 11.6–15.1)
GFR SERPL CREATININE-BSD FRML MDRD: 116 ML/MIN/1.73SQ M
GLUCOSE SERPL-MCNC: 117 MG/DL (ref 65–140)
HCT VFR BLD AUTO: 36 % (ref 34.8–46.1)
HGB BLD-MCNC: 12 G/DL (ref 11.5–15.4)
LACTATE SERPL-SCNC: 1.1 MMOL/L (ref 0.5–2)
LYMPHOCYTES # BLD AUTO: 0.54 THOUSAND/UL (ref 0.6–4.47)
LYMPHOCYTES # BLD AUTO: 3 % (ref 14–44)
MCH RBC QN AUTO: 32.2 PG (ref 26.8–34.3)
MCHC RBC AUTO-ENTMCNC: 33.3 G/DL (ref 31.4–37.4)
MCV RBC AUTO: 97 FL (ref 82–98)
MONOCYTES # BLD AUTO: 0.18 THOUSAND/UL (ref 0–1.22)
MONOCYTES NFR BLD: 1 % (ref 4–12)
NEUTROPHILS # BLD MANUAL: 17.41 THOUSAND/UL (ref 1.85–7.62)
NEUTS SEG NFR BLD AUTO: 96 % (ref 43–75)
PLATELET # BLD AUTO: 324 THOUSANDS/UL (ref 149–390)
PLATELET BLD QL SMEAR: ADEQUATE
PLATELET CLUMP BLD QL SMEAR: PRESENT
PMV BLD AUTO: 9.3 FL (ref 8.9–12.7)
POTASSIUM SERPL-SCNC: 3.7 MMOL/L (ref 3.5–5.3)
RBC # BLD AUTO: 3.73 MILLION/UL (ref 3.81–5.12)
SODIUM SERPL-SCNC: 140 MMOL/L (ref 136–145)
TOTAL CELLS COUNTED SPEC: 100
TROPONIN I SERPL-MCNC: 0.02 NG/ML
TROPONIN I SERPL-MCNC: 0.03 NG/ML
TROPONIN I SERPL-MCNC: 0.04 NG/ML
WBC # BLD AUTO: 18.14 THOUSAND/UL (ref 4.31–10.16)

## 2017-08-25 PROCEDURE — 87045 FECES CULTURE AEROBIC BACT: CPT | Performed by: FAMILY MEDICINE

## 2017-08-25 PROCEDURE — 85027 COMPLETE CBC AUTOMATED: CPT | Performed by: FAMILY MEDICINE

## 2017-08-25 PROCEDURE — 82272 OCCULT BLD FECES 1-3 TESTS: CPT | Performed by: FAMILY MEDICINE

## 2017-08-25 PROCEDURE — 83605 ASSAY OF LACTIC ACID: CPT | Performed by: INTERNAL MEDICINE

## 2017-08-25 PROCEDURE — 87081 CULTURE SCREEN ONLY: CPT | Performed by: INTERNAL MEDICINE

## 2017-08-25 PROCEDURE — 93306 TTE W/DOPPLER COMPLETE: CPT

## 2017-08-25 PROCEDURE — 84484 ASSAY OF TROPONIN QUANT: CPT | Performed by: INTERNAL MEDICINE

## 2017-08-25 PROCEDURE — 80048 BASIC METABOLIC PNL TOTAL CA: CPT | Performed by: FAMILY MEDICINE

## 2017-08-25 PROCEDURE — 87086 URINE CULTURE/COLONY COUNT: CPT | Performed by: INTERNAL MEDICINE

## 2017-08-25 PROCEDURE — 99285 EMERGENCY DEPT VISIT HI MDM: CPT

## 2017-08-25 PROCEDURE — 87015 SPECIMEN INFECT AGNT CONCNTJ: CPT | Performed by: FAMILY MEDICINE

## 2017-08-25 PROCEDURE — 93005 ELECTROCARDIOGRAM TRACING: CPT | Performed by: INTERNAL MEDICINE

## 2017-08-25 PROCEDURE — 85007 BL SMEAR W/DIFF WBC COUNT: CPT | Performed by: FAMILY MEDICINE

## 2017-08-25 PROCEDURE — 71010 HB CHEST X-RAY 1 VIEW FRONTAL (PORTABLE): CPT

## 2017-08-25 PROCEDURE — 87633 RESP VIRUS 12-25 TARGETS: CPT | Performed by: INTERNAL MEDICINE

## 2017-08-25 PROCEDURE — 87046 STOOL CULTR AEROBIC BACT EA: CPT | Performed by: FAMILY MEDICINE

## 2017-08-25 RX ORDER — LIOTHYRONINE SODIUM 5 UG/1
5 TABLET ORAL DAILY
Status: DISCONTINUED | OUTPATIENT
Start: 2017-08-25 | End: 2017-08-26

## 2017-08-25 RX ORDER — DIPHENHYDRAMINE HCL 50 MG
50 CAPSULE ORAL
COMMUNITY
End: 2018-02-12 | Stop reason: ALTCHOICE

## 2017-08-25 RX ORDER — EPINEPHRINE 1 MG/ML(1)
0.3 AMPUL (ML) INJECTION ONCE
Status: COMPLETED | OUTPATIENT
Start: 2017-08-25 | End: 2017-08-25

## 2017-08-25 RX ORDER — SUMATRIPTAN 6 MG/.5ML
6 INJECTION, SOLUTION SUBCUTANEOUS ONCE
Status: COMPLETED | OUTPATIENT
Start: 2017-08-25 | End: 2017-08-25

## 2017-08-25 RX ORDER — LORAZEPAM 1 MG/1
1 TABLET ORAL
Status: DISCONTINUED | OUTPATIENT
Start: 2017-08-25 | End: 2017-08-30 | Stop reason: HOSPADM

## 2017-08-25 RX ORDER — MELATONIN
2000 DAILY
Status: DISCONTINUED | OUTPATIENT
Start: 2017-08-26 | End: 2017-08-30 | Stop reason: HOSPADM

## 2017-08-25 RX ORDER — CIPROFLOXACIN 2 MG/ML
400 INJECTION, SOLUTION INTRAVENOUS EVERY 12 HOURS
Status: DISCONTINUED | OUTPATIENT
Start: 2017-08-25 | End: 2017-08-25

## 2017-08-25 RX ORDER — SODIUM CHLORIDE 9 MG/ML
100 INJECTION, SOLUTION INTRAVENOUS CONTINUOUS
Status: DISCONTINUED | OUTPATIENT
Start: 2017-08-25 | End: 2017-08-25

## 2017-08-25 RX ORDER — DIPHENHYDRAMINE HCL 25 MG
50 TABLET ORAL EVERY 6 HOURS PRN
Status: DISCONTINUED | OUTPATIENT
Start: 2017-08-25 | End: 2017-08-26

## 2017-08-25 RX ORDER — PREDNISONE 1 MG/1
5 TABLET ORAL DAILY
Status: DISCONTINUED | OUTPATIENT
Start: 2017-08-25 | End: 2017-08-25

## 2017-08-25 RX ORDER — LEVOTHYROXINE SODIUM 0.05 MG/1
50 TABLET ORAL
Status: DISCONTINUED | OUTPATIENT
Start: 2017-08-26 | End: 2017-08-30 | Stop reason: HOSPADM

## 2017-08-25 RX ORDER — LEVOTHYROXINE SODIUM 0.05 MG/1
50 TABLET ORAL DAILY
COMMUNITY
End: 2017-08-30 | Stop reason: HOSPADM

## 2017-08-25 RX ORDER — SODIUM CHLORIDE 9 MG/ML
150 INJECTION, SOLUTION INTRAVENOUS CONTINUOUS
Status: DISCONTINUED | OUTPATIENT
Start: 2017-08-25 | End: 2017-08-26

## 2017-08-25 RX ORDER — ACETAMINOPHEN 325 MG/1
650 TABLET ORAL EVERY 6 HOURS PRN
Status: DISCONTINUED | OUTPATIENT
Start: 2017-08-25 | End: 2017-08-26

## 2017-08-25 RX ORDER — LEVOTHYROXINE SODIUM 0.07 MG/1
75 TABLET ORAL
Status: DISCONTINUED | OUTPATIENT
Start: 2017-08-25 | End: 2017-08-25

## 2017-08-25 RX ORDER — OXYCODONE HYDROCHLORIDE 5 MG/1
5 TABLET ORAL EVERY 4 HOURS PRN
Status: DISCONTINUED | OUTPATIENT
Start: 2017-08-25 | End: 2017-08-30 | Stop reason: HOSPADM

## 2017-08-25 RX ORDER — DIPHENHYDRAMINE HCL 25 MG
25 TABLET ORAL EVERY 4 HOURS PRN
Status: DISCONTINUED | OUTPATIENT
Start: 2017-08-25 | End: 2017-08-25

## 2017-08-25 RX ORDER — KETOROLAC TROMETHAMINE 30 MG/ML
INJECTION, SOLUTION INTRAMUSCULAR; INTRAVENOUS
Status: DISPENSED
Start: 2017-08-25 | End: 2017-08-25

## 2017-08-25 RX ORDER — MORPHINE SULFATE 2 MG/ML
2 INJECTION, SOLUTION INTRAMUSCULAR; INTRAVENOUS
Status: DISCONTINUED | OUTPATIENT
Start: 2017-08-25 | End: 2017-08-26

## 2017-08-25 RX ORDER — LEVOTHYROXINE SODIUM 0.1 MG/1
100 TABLET ORAL WEEKLY
COMMUNITY
End: 2017-08-30 | Stop reason: HOSPADM

## 2017-08-25 RX ORDER — KETOROLAC TROMETHAMINE 30 MG/ML
15 INJECTION, SOLUTION INTRAMUSCULAR; INTRAVENOUS ONCE
Status: DISCONTINUED | OUTPATIENT
Start: 2017-08-25 | End: 2017-08-25

## 2017-08-25 RX ORDER — MORPHINE SULFATE 2 MG/ML
2 INJECTION, SOLUTION INTRAMUSCULAR; INTRAVENOUS ONCE
Status: COMPLETED | OUTPATIENT
Start: 2017-08-25 | End: 2017-08-25

## 2017-08-25 RX ORDER — POTASSIUM CHLORIDE 20 MEQ/1
40 TABLET, EXTENDED RELEASE ORAL ONCE
Status: COMPLETED | OUTPATIENT
Start: 2017-08-25 | End: 2017-08-25

## 2017-08-25 RX ADMIN — SODIUM CHLORIDE 150 ML/HR: 0.9 INJECTION, SOLUTION INTRAVENOUS at 21:04

## 2017-08-25 RX ADMIN — MORPHINE SULFATE 2 MG: 2 INJECTION, SOLUTION INTRAMUSCULAR; INTRAVENOUS at 14:34

## 2017-08-25 RX ADMIN — DIPHENHYDRAMINE HCL 25 MG: 25 TABLET ORAL at 23:41

## 2017-08-25 RX ADMIN — MORPHINE SULFATE 2 MG: 2 INJECTION, SOLUTION INTRAMUSCULAR; INTRAVENOUS at 15:27

## 2017-08-25 RX ADMIN — VANCOMYCIN HYDROCHLORIDE 1000 MG: 5 INJECTION, POWDER, LYOPHILIZED, FOR SOLUTION INTRAVENOUS at 13:39

## 2017-08-25 RX ADMIN — HYDROCORTISONE SODIUM SUCCINATE 100 MG: 100 INJECTION, POWDER, FOR SOLUTION INTRAMUSCULAR; INTRAVENOUS at 22:55

## 2017-08-25 RX ADMIN — DIPHENHYDRAMINE HCL 25 MG: 25 TABLET ORAL at 22:49

## 2017-08-25 RX ADMIN — METRONIDAZOLE 500 MG: 500 INJECTION, SOLUTION INTRAVENOUS at 08:28

## 2017-08-25 RX ADMIN — EPINEPHRINE 0.3 MG: 1 INJECTION, SOLUTION INTRAMUSCULAR; SUBCUTANEOUS at 09:22

## 2017-08-25 RX ADMIN — PREDNISONE 5 MG: 5 TABLET ORAL at 08:28

## 2017-08-25 RX ADMIN — MORPHINE SULFATE 2 MG: 2 INJECTION, SOLUTION INTRAMUSCULAR; INTRAVENOUS at 18:38

## 2017-08-25 RX ADMIN — MORPHINE SULFATE 2 MG: 2 INJECTION, SOLUTION INTRAMUSCULAR; INTRAVENOUS at 21:49

## 2017-08-25 RX ADMIN — CIPROFLOXACIN 400 MG: 2 INJECTION INTRAVENOUS at 02:09

## 2017-08-25 RX ADMIN — PIPERACILLIN SODIUM,TAZOBACTAM SODIUM 3.38 G: 3; .375 INJECTION, POWDER, FOR SOLUTION INTRAVENOUS at 12:38

## 2017-08-25 RX ADMIN — POTASSIUM CHLORIDE 40 MEQ: 1500 TABLET, EXTENDED RELEASE ORAL at 01:36

## 2017-08-25 RX ADMIN — PIPERACILLIN SODIUM,TAZOBACTAM SODIUM 3.38 G: 3; .375 INJECTION, POWDER, FOR SOLUTION INTRAVENOUS at 23:41

## 2017-08-25 RX ADMIN — SODIUM CHLORIDE 1000 ML: 0.9 INJECTION, SOLUTION INTRAVENOUS at 08:54

## 2017-08-25 RX ADMIN — DIPHENHYDRAMINE HCL 25 MG: 25 TABLET ORAL at 01:11

## 2017-08-25 RX ADMIN — HYDROMORPHONE HYDROCHLORIDE 0.5 MG: 1 INJECTION, SOLUTION INTRAMUSCULAR; INTRAVENOUS; SUBCUTANEOUS at 10:39

## 2017-08-25 RX ADMIN — PIPERACILLIN SODIUM,TAZOBACTAM SODIUM 3.38 G: 3; .375 INJECTION, POWDER, FOR SOLUTION INTRAVENOUS at 18:44

## 2017-08-25 RX ADMIN — LORAZEPAM 1 MG: 1 TABLET ORAL at 22:48

## 2017-08-25 RX ADMIN — LIOTHYRONINE SODIUM 5 MCG: 5 TABLET ORAL at 13:53

## 2017-08-25 RX ADMIN — SODIUM CHLORIDE 500 ML: 0.9 INJECTION, SOLUTION INTRAVENOUS at 05:37

## 2017-08-25 RX ADMIN — SODIUM CHLORIDE 100 ML/HR: 0.9 INJECTION, SOLUTION INTRAVENOUS at 01:35

## 2017-08-25 RX ADMIN — LORAZEPAM 1 MG: 1 TABLET ORAL at 01:11

## 2017-08-25 RX ADMIN — SUMATRIPTAN 6 MG: 6 INJECTION, SOLUTION SUBCUTANEOUS at 08:28

## 2017-08-25 RX ADMIN — METRONIDAZOLE 500 MG: 500 INJECTION, SOLUTION INTRAVENOUS at 01:34

## 2017-08-25 RX ADMIN — VANCOMYCIN HYDROCHLORIDE 1000 MG: 5 INJECTION, POWDER, LYOPHILIZED, FOR SOLUTION INTRAVENOUS at 21:06

## 2017-08-25 RX ADMIN — METRONIDAZOLE 500 MG: 500 INJECTION, SOLUTION INTRAVENOUS at 17:24

## 2017-08-25 RX ADMIN — SODIUM CHLORIDE 1000 ML: 0.9 INJECTION, SOLUTION INTRAVENOUS at 00:40

## 2017-08-25 RX ADMIN — HYDROCORTISONE SODIUM SUCCINATE 100 MG: 100 INJECTION, POWDER, FOR SOLUTION INTRAMUSCULAR; INTRAVENOUS at 07:02

## 2017-08-25 RX ADMIN — OXYCODONE HYDROCHLORIDE 5 MG: 5 TABLET ORAL at 19:48

## 2017-08-26 PROBLEM — E83.39 HYPOPHOSPHATEMIA: Status: ACTIVE | Noted: 2017-08-26

## 2017-08-26 PROBLEM — F41.1 GENERALIZED ANXIETY DISORDER: Chronic | Status: ACTIVE | Noted: 2017-08-26

## 2017-08-26 PROBLEM — R79.89 LOW TSH LEVEL: Status: ACTIVE | Noted: 2017-08-26

## 2017-08-26 PROBLEM — E03.9 HYPOTHYROIDISM: Status: ACTIVE | Noted: 2017-08-26

## 2017-08-26 PROBLEM — E83.42 HYPOMAGNESEMIA: Status: ACTIVE | Noted: 2017-08-26

## 2017-08-26 PROBLEM — E83.51 HYPOCALCEMIA: Status: ACTIVE | Noted: 2017-08-26

## 2017-08-26 LAB
ADENOVIRUS: NOT DETECTED
ALBUMIN SERPL BCP-MCNC: 2 G/DL (ref 3.5–5)
ALP SERPL-CCNC: 37 U/L (ref 46–116)
ALT SERPL W P-5'-P-CCNC: 90 U/L (ref 12–78)
AMYLASE SERPL-CCNC: 35 IU/L (ref 25–115)
ANION GAP SERPL CALCULATED.3IONS-SCNC: 10 MMOL/L (ref 4–13)
ANION GAP SERPL CALCULATED.3IONS-SCNC: 8 MMOL/L (ref 4–13)
AST SERPL W P-5'-P-CCNC: 54 U/L (ref 5–45)
ATRIAL RATE: 107 BPM
ATRIAL RATE: 54 BPM
B-HCG SERPL-ACNC: <2 MIU/ML
BACTERIA UR CULT: NORMAL
BASOPHILS # BLD AUTO: 0.02 THOUSANDS/ΜL (ref 0–0.1)
BASOPHILS # BLD MANUAL: 0 THOUSAND/UL (ref 0–0.1)
BASOPHILS NFR BLD AUTO: 0 % (ref 0–1)
BASOPHILS NFR MAR MANUAL: 0 % (ref 0–1)
BILIRUB SERPL-MCNC: 0.9 MG/DL (ref 0.2–1)
BUN SERPL-MCNC: 3 MG/DL (ref 5–25)
BUN SERPL-MCNC: 3 MG/DL (ref 5–25)
C PNEUM DNA SPEC QL NAA+PROBE: NOT DETECTED
CA-I BLD-SCNC: 0.87 MMOL/L (ref 1.12–1.32)
CALCIUM SERPL-MCNC: 5.7 MG/DL (ref 8.3–10.1)
CALCIUM SERPL-MCNC: 7.9 MG/DL (ref 8.3–10.1)
CHLORIDE SERPL-SCNC: 111 MMOL/L (ref 100–108)
CHLORIDE SERPL-SCNC: 115 MMOL/L (ref 100–108)
CO2 SERPL-SCNC: 20 MMOL/L (ref 21–32)
CO2 SERPL-SCNC: 24 MMOL/L (ref 21–32)
CREAT SERPL-MCNC: 0.75 MG/DL (ref 0.6–1.3)
CREAT SERPL-MCNC: 0.92 MG/DL (ref 0.6–1.3)
CRP SERPL QL: 15.2 MG/L
EOSINOPHIL # BLD AUTO: 0 THOUSAND/ΜL (ref 0–0.61)
EOSINOPHIL # BLD MANUAL: 0 THOUSAND/UL (ref 0–0.4)
EOSINOPHIL NFR BLD AUTO: 0 % (ref 0–6)
EOSINOPHIL NFR BLD MANUAL: 0 % (ref 0–6)
ERYTHROCYTE [DISTWIDTH] IN BLOOD BY AUTOMATED COUNT: 13.4 % (ref 11.6–15.1)
ERYTHROCYTE [DISTWIDTH] IN BLOOD BY AUTOMATED COUNT: 13.5 % (ref 11.6–15.1)
ERYTHROCYTE [SEDIMENTATION RATE] IN BLOOD: 1 MM/HOUR (ref 0–20)
FLUAV H1 RNA SPEC QL NAA+PROBE: NOT DETECTED
FLUAV H3 RNA SPEC QL NAA+PROBE: NOT DETECTED
FLUAV RNA SPEC QL NAA+PROBE: NOT DETECTED
FLUBV RNA SPEC QL NAA+PROBE: NOT DETECTED
GFR SERPL CREATININE-BSD FRML MDRD: 76 ML/MIN/1.73SQ M
GFR SERPL CREATININE-BSD FRML MDRD: 97 ML/MIN/1.73SQ M
GLUCOSE SERPL-MCNC: 108 MG/DL (ref 65–140)
GLUCOSE SERPL-MCNC: 201 MG/DL (ref 65–140)
HBOV DNA SPEC QL NAA+PROBE: NOT DETECTED
HCOV 229E RNA SPEC QL NAA+PROBE: NOT DETECTED
HCOV HKU1 RNA SPEC QL NAA+PROBE: NOT DETECTED
HCOV NL63 RNA SPEC QL NAA+PROBE: NOT DETECTED
HCOV OC43 RNA SPEC QL NAA+PROBE: NOT DETECTED
HCT VFR BLD AUTO: 30.9 % (ref 34.8–46.1)
HCT VFR BLD AUTO: 38.8 % (ref 34.8–46.1)
HGB BLD-MCNC: 10 G/DL (ref 11.5–15.4)
HGB BLD-MCNC: 12.6 G/DL (ref 11.5–15.4)
HPIV1 RNA SPEC QL NAA+PROBE: NOT DETECTED
HPIV2 RNA SPEC QL NAA+PROBE: NOT DETECTED
HPIV3 RNA SPEC QL NAA+PROBE: NOT DETECTED
HPIV4 RNA SPEC QL NAA+PROBE: NOT DETECTED
INR PPP: 1.04 (ref 0.86–1.16)
LACTATE SERPL-SCNC: 2 MMOL/L (ref 0.5–2)
LACTATE SERPL-SCNC: 2.3 MMOL/L (ref 0.5–2)
LIPASE SERPL-CCNC: 52 U/L (ref 73–393)
LYMPHOCYTES # BLD AUTO: 0.66 THOUSANDS/ΜL (ref 0.6–4.47)
LYMPHOCYTES # BLD AUTO: 0.76 THOUSAND/UL (ref 0.6–4.47)
LYMPHOCYTES # BLD AUTO: 9 % (ref 14–44)
LYMPHOCYTES NFR BLD AUTO: 6 % (ref 14–44)
M PNEUMO DNA SPEC QL NAA+PROBE: NOT DETECTED
MAGNESIUM SERPL-MCNC: 1.2 MG/DL (ref 1.6–2.6)
MAGNESIUM SERPL-MCNC: 2.1 MG/DL (ref 1.6–2.6)
MCH RBC QN AUTO: 31.2 PG (ref 26.8–34.3)
MCH RBC QN AUTO: 31.5 PG (ref 26.8–34.3)
MCHC RBC AUTO-ENTMCNC: 32.4 G/DL (ref 31.4–37.4)
MCHC RBC AUTO-ENTMCNC: 32.5 G/DL (ref 31.4–37.4)
MCV RBC AUTO: 96 FL (ref 82–98)
MCV RBC AUTO: 98 FL (ref 82–98)
METAPNEUMOVIRUS: NOT DETECTED
MONOCYTES # BLD AUTO: 0.08 THOUSAND/UL (ref 0–1.22)
MONOCYTES # BLD AUTO: 0.64 THOUSAND/ΜL (ref 0.17–1.22)
MONOCYTES NFR BLD AUTO: 6 % (ref 4–12)
MONOCYTES NFR BLD: 1 % (ref 4–12)
NEUTROPHILS # BLD AUTO: 9.72 THOUSANDS/ΜL (ref 1.85–7.62)
NEUTROPHILS # BLD MANUAL: 7.55 THOUSAND/UL (ref 1.85–7.62)
NEUTS SEG NFR BLD AUTO: 88 % (ref 43–75)
NEUTS SEG NFR BLD AUTO: 90 % (ref 43–75)
P AXIS: -17 DEGREES
P AXIS: 35 DEGREES
PHOSPHATE SERPL-MCNC: 1.8 MG/DL (ref 2.7–4.5)
PHOSPHATE SERPL-MCNC: 2.2 MG/DL (ref 2.7–4.5)
PLATELET # BLD AUTO: 279 THOUSANDS/UL (ref 149–390)
PLATELET # BLD AUTO: 338 THOUSANDS/UL (ref 149–390)
PLATELET BLD QL SMEAR: ADEQUATE
PMV BLD AUTO: 9.3 FL (ref 8.9–12.7)
PMV BLD AUTO: 9.6 FL (ref 8.9–12.7)
POTASSIUM SERPL-SCNC: 2.5 MMOL/L (ref 3.5–5.3)
POTASSIUM SERPL-SCNC: 3.9 MMOL/L (ref 3.5–5.3)
PR INTERVAL: 122 MS
PR INTERVAL: 130 MS
PROT SERPL-MCNC: 4.5 G/DL (ref 6.4–8.2)
PROTHROMBIN TIME: 13.5 SECONDS (ref 12.1–14.4)
QRS AXIS: 62 DEGREES
QRS AXIS: 69 DEGREES
QRSD INTERVAL: 76 MS
QRSD INTERVAL: 82 MS
QT INTERVAL: 316 MS
QT INTERVAL: 452 MS
QTC INTERVAL: 421 MS
QTC INTERVAL: 428 MS
RBC # BLD AUTO: 3.17 MILLION/UL (ref 3.81–5.12)
RBC # BLD AUTO: 4.04 MILLION/UL (ref 3.81–5.12)
RETICS # AUTO: ABNORMAL 10*3/UL (ref 14097–95744)
RETICS # CALC: 2 % (ref 0.37–1.87)
RHINOVIRUS RNA SPEC QL NAA+PROBE: NOT DETECTED
RSV A RNA SPEC QL NAA+PROBE: NOT DETECTED
RSV B RNA SPEC QL NAA+PROBE: NOT DETECTED
SODIUM SERPL-SCNC: 143 MMOL/L (ref 136–145)
SODIUM SERPL-SCNC: 145 MMOL/L (ref 136–145)
T WAVE AXIS: 40 DEGREES
T WAVE AXIS: 57 DEGREES
TOTAL CELLS COUNTED SPEC: 100
TSH SERPL DL<=0.05 MIU/L-ACNC: 0.17 UIU/ML (ref 0.36–3.74)
VANCOMYCIN TROUGH SERPL-MCNC: 17 UG/ML (ref 10–20)
VENTRICULAR RATE: 107 BPM
VENTRICULAR RATE: 54 BPM
WBC # BLD AUTO: 11.04 THOUSAND/UL (ref 4.31–10.16)
WBC # BLD AUTO: 8.39 THOUSAND/UL (ref 4.31–10.16)

## 2017-08-26 PROCEDURE — 83605 ASSAY OF LACTIC ACID: CPT | Performed by: INTERNAL MEDICINE

## 2017-08-26 PROCEDURE — 83735 ASSAY OF MAGNESIUM: CPT | Performed by: INTERNAL MEDICINE

## 2017-08-26 PROCEDURE — 85025 COMPLETE CBC W/AUTO DIFF WBC: CPT | Performed by: INTERNAL MEDICINE

## 2017-08-26 PROCEDURE — 94760 N-INVAS EAR/PLS OXIMETRY 1: CPT

## 2017-08-26 PROCEDURE — 94640 AIRWAY INHALATION TREATMENT: CPT

## 2017-08-26 PROCEDURE — 82330 ASSAY OF CALCIUM: CPT | Performed by: INTERNAL MEDICINE

## 2017-08-26 PROCEDURE — 82150 ASSAY OF AMYLASE: CPT | Performed by: INTERNAL MEDICINE

## 2017-08-26 PROCEDURE — 85007 BL SMEAR W/DIFF WBC COUNT: CPT | Performed by: INTERNAL MEDICINE

## 2017-08-26 PROCEDURE — 85027 COMPLETE CBC AUTOMATED: CPT | Performed by: INTERNAL MEDICINE

## 2017-08-26 PROCEDURE — 86140 C-REACTIVE PROTEIN: CPT | Performed by: INTERNAL MEDICINE

## 2017-08-26 PROCEDURE — 84100 ASSAY OF PHOSPHORUS: CPT | Performed by: INTERNAL MEDICINE

## 2017-08-26 PROCEDURE — 80202 ASSAY OF VANCOMYCIN: CPT | Performed by: INTERNAL MEDICINE

## 2017-08-26 PROCEDURE — 84702 CHORIONIC GONADOTROPIN TEST: CPT | Performed by: INTERNAL MEDICINE

## 2017-08-26 PROCEDURE — 84443 ASSAY THYROID STIM HORMONE: CPT | Performed by: INTERNAL MEDICINE

## 2017-08-26 PROCEDURE — 83690 ASSAY OF LIPASE: CPT | Performed by: INTERNAL MEDICINE

## 2017-08-26 PROCEDURE — 85045 AUTOMATED RETICULOCYTE COUNT: CPT | Performed by: INTERNAL MEDICINE

## 2017-08-26 PROCEDURE — 85652 RBC SED RATE AUTOMATED: CPT | Performed by: INTERNAL MEDICINE

## 2017-08-26 PROCEDURE — 80048 BASIC METABOLIC PNL TOTAL CA: CPT | Performed by: INTERNAL MEDICINE

## 2017-08-26 PROCEDURE — 85610 PROTHROMBIN TIME: CPT | Performed by: INTERNAL MEDICINE

## 2017-08-26 PROCEDURE — 80053 COMPREHEN METABOLIC PANEL: CPT | Performed by: FAMILY MEDICINE

## 2017-08-26 PROCEDURE — 83520 IMMUNOASSAY QUANT NOS NONAB: CPT | Performed by: INTERNAL MEDICINE

## 2017-08-26 RX ORDER — METOCLOPRAMIDE HYDROCHLORIDE 5 MG/ML
10 INJECTION INTRAMUSCULAR; INTRAVENOUS EVERY 6 HOURS PRN
Status: DISCONTINUED | OUTPATIENT
Start: 2017-08-26 | End: 2017-08-30 | Stop reason: HOSPADM

## 2017-08-26 RX ORDER — PREDNISONE 20 MG/1
40 TABLET ORAL DAILY
Status: SHIPPED | OUTPATIENT
Start: 2017-08-21 | End: 2017-08-24

## 2017-08-26 RX ORDER — PREDNISONE 20 MG/1
20 TABLET ORAL DAILY
Status: DISCONTINUED | OUTPATIENT
Start: 2017-08-31 | End: 2017-08-30 | Stop reason: HOSPADM

## 2017-08-26 RX ORDER — PREDNISONE 20 MG/1
20 TABLET ORAL DAILY
Status: DISCONTINUED | OUTPATIENT
Start: 2017-08-24 | End: 2017-08-26

## 2017-08-26 RX ORDER — DIPHENHYDRAMINE HYDROCHLORIDE 50 MG/ML
INJECTION INTRAMUSCULAR; INTRAVENOUS
Status: COMPLETED
Start: 2017-08-26 | End: 2017-08-26

## 2017-08-26 RX ORDER — CALCIUM GLUCONATE 94 MG/ML
INJECTION, SOLUTION INTRAVENOUS
Status: COMPLETED
Start: 2017-08-26 | End: 2017-08-26

## 2017-08-26 RX ORDER — MAGNESIUM SULFATE HEPTAHYDRATE 40 MG/ML
2 INJECTION, SOLUTION INTRAVENOUS ONCE
Status: COMPLETED | OUTPATIENT
Start: 2017-08-26 | End: 2017-08-26

## 2017-08-26 RX ORDER — DIPHENHYDRAMINE HYDROCHLORIDE 50 MG/ML
25 INJECTION INTRAMUSCULAR; INTRAVENOUS EVERY 6 HOURS PRN
Status: DISCONTINUED | OUTPATIENT
Start: 2017-08-26 | End: 2017-08-30 | Stop reason: HOSPADM

## 2017-08-26 RX ORDER — POTASSIUM CHLORIDE 20 MEQ/1
40 TABLET, EXTENDED RELEASE ORAL
Status: DISCONTINUED | OUTPATIENT
Start: 2017-08-26 | End: 2017-08-26

## 2017-08-26 RX ORDER — SODIUM CHLORIDE FOR INHALATION 0.9 %
VIAL, NEBULIZER (ML) INHALATION
Status: COMPLETED
Start: 2017-08-26 | End: 2017-08-26

## 2017-08-26 RX ORDER — PREDNISONE 20 MG/1
40 TABLET ORAL DAILY
Status: COMPLETED | OUTPATIENT
Start: 2017-08-28 | End: 2017-08-30

## 2017-08-26 RX ORDER — LEVALBUTEROL 1.25 MG/.5ML
SOLUTION, CONCENTRATE RESPIRATORY (INHALATION)
Status: COMPLETED
Start: 2017-08-26 | End: 2017-08-26

## 2017-08-26 RX ORDER — LORAZEPAM 2 MG/ML
INJECTION INTRAMUSCULAR
Status: COMPLETED
Start: 2017-08-26 | End: 2017-08-26

## 2017-08-26 RX ORDER — SODIUM CHLORIDE FOR INHALATION 0.9 %
3 VIAL, NEBULIZER (ML) INHALATION EVERY 6 HOURS PRN
Status: DISCONTINUED | OUTPATIENT
Start: 2017-08-26 | End: 2017-08-30 | Stop reason: HOSPADM

## 2017-08-26 RX ORDER — MORPHINE SULFATE 4 MG/ML
4 INJECTION, SOLUTION INTRAMUSCULAR; INTRAVENOUS
Status: DISCONTINUED | OUTPATIENT
Start: 2017-08-26 | End: 2017-08-30 | Stop reason: HOSPADM

## 2017-08-26 RX ORDER — POTASSIUM CHLORIDE 20MEQ/15ML
40 LIQUID (ML) ORAL EVERY 6 HOURS
Status: DISCONTINUED | OUTPATIENT
Start: 2017-08-26 | End: 2017-08-26

## 2017-08-26 RX ORDER — DEXTROSE, SODIUM CHLORIDE, AND POTASSIUM CHLORIDE 5; .45; .3 G/100ML; G/100ML; G/100ML
150 INJECTION INTRAVENOUS CONTINUOUS
Status: DISCONTINUED | OUTPATIENT
Start: 2017-08-26 | End: 2017-08-27

## 2017-08-26 RX ORDER — LEVALBUTEROL 1.25 MG/.5ML
1.25 SOLUTION, CONCENTRATE RESPIRATORY (INHALATION) EVERY 6 HOURS PRN
Status: DISCONTINUED | OUTPATIENT
Start: 2017-08-26 | End: 2017-08-30 | Stop reason: HOSPADM

## 2017-08-26 RX ORDER — PREDNISONE 20 MG/1
60 TABLET ORAL DAILY
Status: COMPLETED | OUTPATIENT
Start: 2017-08-27 | End: 2017-08-27

## 2017-08-26 RX ORDER — LORAZEPAM 2 MG/ML
0.5 INJECTION INTRAMUSCULAR EVERY 6 HOURS PRN
Status: DISCONTINUED | OUTPATIENT
Start: 2017-08-26 | End: 2017-08-30 | Stop reason: HOSPADM

## 2017-08-26 RX ORDER — POTASSIUM CHLORIDE AND SODIUM CHLORIDE 450; 150 MG/100ML; MG/100ML
125 INJECTION, SOLUTION INTRAVENOUS CONTINUOUS
Status: DISCONTINUED | OUTPATIENT
Start: 2017-08-26 | End: 2017-08-26

## 2017-08-26 RX ADMIN — LEVALBUTEROL 1.25 MG: 1.25 SOLUTION, CONCENTRATE RESPIRATORY (INHALATION) at 15:46

## 2017-08-26 RX ADMIN — VANCOMYCIN HYDROCHLORIDE 1000 MG: 5 INJECTION, POWDER, LYOPHILIZED, FOR SOLUTION INTRAVENOUS at 13:34

## 2017-08-26 RX ADMIN — POTASSIUM CHLORIDE AND SODIUM CHLORIDE 125 ML/HR: 450; 150 INJECTION, SOLUTION INTRAVENOUS at 08:16

## 2017-08-26 RX ADMIN — MORPHINE SULFATE 4 MG: 4 INJECTION, SOLUTION INTRAMUSCULAR; INTRAVENOUS at 13:33

## 2017-08-26 RX ADMIN — DEXTROSE, SODIUM CHLORIDE, AND POTASSIUM CHLORIDE 150 ML/HR: 5; .45; .3 INJECTION INTRAVENOUS at 22:45

## 2017-08-26 RX ADMIN — METOCLOPRAMIDE 10 MG: 5 INJECTION, SOLUTION INTRAMUSCULAR; INTRAVENOUS at 08:49

## 2017-08-26 RX ADMIN — ISODIUM CHLORIDE 3 ML: 0.03 SOLUTION RESPIRATORY (INHALATION) at 15:54

## 2017-08-26 RX ADMIN — VANCOMYCIN HYDROCHLORIDE 1000 MG: 5 INJECTION, POWDER, LYOPHILIZED, FOR SOLUTION INTRAVENOUS at 20:28

## 2017-08-26 RX ADMIN — HYDROCORTISONE SODIUM SUCCINATE 50 MG: 100 INJECTION, POWDER, FOR SOLUTION INTRAMUSCULAR; INTRAVENOUS at 22:10

## 2017-08-26 RX ADMIN — METRONIDAZOLE 500 MG: 500 INJECTION, SOLUTION INTRAVENOUS at 00:53

## 2017-08-26 RX ADMIN — POTASSIUM CHLORIDE 40 MEQ: 20 SOLUTION ORAL at 08:17

## 2017-08-26 RX ADMIN — DIPHENHYDRAMINE HYDROCHLORIDE 25 MG: 50 INJECTION, SOLUTION INTRAMUSCULAR; INTRAVENOUS at 09:15

## 2017-08-26 RX ADMIN — PIPERACILLIN SODIUM,TAZOBACTAM SODIUM 3.38 G: 3; .375 INJECTION, POWDER, FOR SOLUTION INTRAVENOUS at 05:57

## 2017-08-26 RX ADMIN — ISODIUM CHLORIDE 3 ML: 0.03 SOLUTION RESPIRATORY (INHALATION) at 05:00

## 2017-08-26 RX ADMIN — MORPHINE SULFATE 2 MG: 2 INJECTION, SOLUTION INTRAMUSCULAR; INTRAVENOUS at 06:45

## 2017-08-26 RX ADMIN — METRONIDAZOLE 500 MG: 500 INJECTION, SOLUTION INTRAVENOUS at 09:00

## 2017-08-26 RX ADMIN — DIPHENHYDRAMINE HYDROCHLORIDE 25 MG: 50 INJECTION, SOLUTION INTRAMUSCULAR; INTRAVENOUS at 15:35

## 2017-08-26 RX ADMIN — DEXTROSE, SODIUM CHLORIDE, AND POTASSIUM CHLORIDE 150 ML/HR: 5; .45; .3 INJECTION INTRAVENOUS at 15:58

## 2017-08-26 RX ADMIN — LEVALBUTEROL 1.25 MG: 1.25 SOLUTION, CONCENTRATE RESPIRATORY (INHALATION) at 05:00

## 2017-08-26 RX ADMIN — LEVOTHYROXINE SODIUM 50 MCG: 50 TABLET ORAL at 05:57

## 2017-08-26 RX ADMIN — DIPHENHYDRAMINE HYDROCHLORIDE 25 MG: 50 INJECTION, SOLUTION INTRAMUSCULAR; INTRAVENOUS at 08:42

## 2017-08-26 RX ADMIN — DEXTROSE, SODIUM CHLORIDE, AND POTASSIUM CHLORIDE 150 ML/HR: 5; .45; .3 INJECTION INTRAVENOUS at 09:13

## 2017-08-26 RX ADMIN — PIPERACILLIN SODIUM,TAZOBACTAM SODIUM 3.38 G: 3; .375 INJECTION, POWDER, FOR SOLUTION INTRAVENOUS at 23:48

## 2017-08-26 RX ADMIN — VANCOMYCIN HYDROCHLORIDE 1000 MG: 5 INJECTION, POWDER, LYOPHILIZED, FOR SOLUTION INTRAVENOUS at 04:10

## 2017-08-26 RX ADMIN — PIPERACILLIN SODIUM,TAZOBACTAM SODIUM 3.38 G: 3; .375 INJECTION, POWDER, FOR SOLUTION INTRAVENOUS at 12:17

## 2017-08-26 RX ADMIN — METRONIDAZOLE 500 MG: 500 INJECTION, SOLUTION INTRAVENOUS at 17:39

## 2017-08-26 RX ADMIN — DIPHENHYDRAMINE HYDROCHLORIDE 25 MG: 50 INJECTION, SOLUTION INTRAMUSCULAR; INTRAVENOUS at 22:11

## 2017-08-26 RX ADMIN — PIPERACILLIN SODIUM,TAZOBACTAM SODIUM 3.38 G: 3; .375 INJECTION, POWDER, FOR SOLUTION INTRAVENOUS at 18:08

## 2017-08-26 RX ADMIN — POTASSIUM PHOSPHATE, MONOBASIC AND POTASSIUM PHOSPHATE, DIBASIC 12 MMOL: 224; 236 INJECTION, SOLUTION INTRAVENOUS at 09:39

## 2017-08-26 RX ADMIN — CALCIUM GLUCONATE 2 G: 94 INJECTION, SOLUTION INTRAVENOUS at 07:56

## 2017-08-26 RX ADMIN — MORPHINE SULFATE 4 MG: 4 INJECTION, SOLUTION INTRAMUSCULAR; INTRAVENOUS at 19:47

## 2017-08-26 RX ADMIN — LORAZEPAM 0.5 MG: 2 INJECTION INTRAMUSCULAR; INTRAVENOUS at 22:10

## 2017-08-26 RX ADMIN — HYDROCORTISONE SODIUM SUCCINATE 100 MG: 100 INJECTION, POWDER, FOR SOLUTION INTRAMUSCULAR; INTRAVENOUS at 05:57

## 2017-08-26 RX ADMIN — METOCLOPRAMIDE 10 MG: 5 INJECTION, SOLUTION INTRAMUSCULAR; INTRAVENOUS at 19:49

## 2017-08-26 RX ADMIN — DIPHENHYDRAMINE HCL 50 MG: 25 TABLET ORAL at 05:57

## 2017-08-26 RX ADMIN — MAGNESIUM SULFATE HEPTAHYDRATE 2 G: 40 INJECTION, SOLUTION INTRAVENOUS at 05:58

## 2017-08-26 RX ADMIN — LORAZEPAM 0.5 MG: 2 INJECTION INTRAMUSCULAR; INTRAVENOUS at 09:12

## 2017-08-26 RX ADMIN — HYDROCORTISONE SODIUM SUCCINATE 50 MG: 100 INJECTION, POWDER, FOR SOLUTION INTRAMUSCULAR; INTRAVENOUS at 13:30

## 2017-08-26 RX ADMIN — MORPHINE SULFATE 2 MG: 2 INJECTION, SOLUTION INTRAMUSCULAR; INTRAVENOUS at 00:53

## 2017-08-27 ENCOUNTER — APPOINTMENT (INPATIENT)
Dept: CT IMAGING | Facility: HOSPITAL | Age: 42
DRG: 871 | End: 2017-08-27
Payer: COMMERCIAL

## 2017-08-27 PROBLEM — I50.31 ACUTE DIASTOLIC CHF (CONGESTIVE HEART FAILURE) (HCC): Status: ACTIVE | Noted: 2017-08-27

## 2017-08-27 PROBLEM — J96.01 ACUTE RESPIRATORY FAILURE WITH HYPOXIA (HCC): Status: ACTIVE | Noted: 2017-08-27

## 2017-08-27 PROBLEM — J90 BILATERAL PLEURAL EFFUSION: Status: ACTIVE | Noted: 2017-08-27

## 2017-08-27 PROBLEM — I50.1 ACUTE CARDIOGENIC PULMONARY EDEMA (HCC): Status: ACTIVE | Noted: 2017-08-27

## 2017-08-27 PROBLEM — R73.9 HYPERGLYCEMIA: Status: ACTIVE | Noted: 2017-08-27

## 2017-08-27 LAB
ALBUMIN SERPL BCP-MCNC: 2.5 G/DL (ref 3.5–5)
ALP SERPL-CCNC: 48 U/L (ref 46–116)
ALT SERPL W P-5'-P-CCNC: 75 U/L (ref 12–78)
ANION GAP SERPL CALCULATED.3IONS-SCNC: 8 MMOL/L (ref 4–13)
ARTERIAL PATENCY WRIST A: YES
AST SERPL W P-5'-P-CCNC: 24 U/L (ref 5–45)
BACTERIA STL CULT: NO GROWTH
BACTERIA STL CULT: NORMAL
BASE EXCESS BLDA CALC-SCNC: -2 MMOL/L
BILIRUB SERPL-MCNC: 0.2 MG/DL (ref 0.2–1)
BUN SERPL-MCNC: 3 MG/DL (ref 5–25)
CA-I BLD-SCNC: 1.14 MMOL/L (ref 1.12–1.32)
CALCIUM SERPL-MCNC: 7.6 MG/DL (ref 8.3–10.1)
CHLORIDE SERPL-SCNC: 111 MMOL/L (ref 100–108)
CO2 SERPL-SCNC: 24 MMOL/L (ref 21–32)
CREAT SERPL-MCNC: 0.81 MG/DL (ref 0.6–1.3)
ERYTHROCYTE [DISTWIDTH] IN BLOOD BY AUTOMATED COUNT: 13.6 % (ref 11.6–15.1)
EST. AVERAGE GLUCOSE BLD GHB EST-MCNC: 97 MG/DL
GFR SERPL CREATININE-BSD FRML MDRD: 89 ML/MIN/1.73SQ M
GLUCOSE SERPL-MCNC: 115 MG/DL (ref 65–140)
GLUCOSE SERPL-MCNC: 133 MG/DL (ref 65–140)
GLUCOSE SERPL-MCNC: 137 MG/DL (ref 65–140)
GLUCOSE SERPL-MCNC: 174 MG/DL (ref 65–140)
GLUCOSE SERPL-MCNC: 212 MG/DL (ref 65–140)
HBA1C MFR BLD: 5 % (ref 4.2–6.3)
HCO3 BLDA-SCNC: 20.6 MMOL/L (ref 22–28)
HCT VFR BLD AUTO: 37.6 % (ref 34.8–46.1)
HGB BLD-MCNC: 12.3 G/DL (ref 11.5–15.4)
LACTATE SERPL-SCNC: 2.1 MMOL/L (ref 0.5–2)
LACTATE SERPL-SCNC: 3.2 MMOL/L (ref 0.5–2)
MAGNESIUM SERPL-MCNC: 1.8 MG/DL (ref 1.6–2.6)
MCH RBC QN AUTO: 31.3 PG (ref 26.8–34.3)
MCHC RBC AUTO-ENTMCNC: 32.7 G/DL (ref 31.4–37.4)
MCV RBC AUTO: 96 FL (ref 82–98)
MRSA NOSE QL CULT: NORMAL
NON VENT ROOM AIR: ABNORMAL %
NT-PROBNP SERPL-MCNC: 2350 PG/ML
O2 CT BLDA-SCNC: 17 ML/DL (ref 16–23)
OXYHGB MFR BLDA: 94.5 % (ref 94–97)
PCO2 BLDA: 29.2 MM HG (ref 36–44)
PH BLDA: 7.47 [PH] (ref 7.35–7.45)
PHOSPHATE SERPL-MCNC: 1.6 MG/DL (ref 2.7–4.5)
PLATELET # BLD AUTO: 329 THOUSANDS/UL (ref 149–390)
PMV BLD AUTO: 9.7 FL (ref 8.9–12.7)
PO2 BLDA: 66.7 MM HG (ref 75–129)
POTASSIUM SERPL-SCNC: 3.7 MMOL/L (ref 3.5–5.3)
PROT SERPL-MCNC: 5.7 G/DL (ref 6.4–8.2)
RBC # BLD AUTO: 3.93 MILLION/UL (ref 3.81–5.12)
SODIUM SERPL-SCNC: 143 MMOL/L (ref 136–145)
SPECIMEN SOURCE: ABNORMAL
TROPONIN I SERPL-MCNC: 0.1 NG/ML
TROPONIN I SERPL-MCNC: 0.12 NG/ML
WBC # BLD AUTO: 14.63 THOUSAND/UL (ref 4.31–10.16)

## 2017-08-27 PROCEDURE — 36600 WITHDRAWAL OF ARTERIAL BLOOD: CPT

## 2017-08-27 PROCEDURE — 94760 N-INVAS EAR/PLS OXIMETRY 1: CPT

## 2017-08-27 PROCEDURE — 82330 ASSAY OF CALCIUM: CPT | Performed by: INTERNAL MEDICINE

## 2017-08-27 PROCEDURE — 83605 ASSAY OF LACTIC ACID: CPT | Performed by: INTERNAL MEDICINE

## 2017-08-27 PROCEDURE — 94640 AIRWAY INHALATION TREATMENT: CPT

## 2017-08-27 PROCEDURE — 83735 ASSAY OF MAGNESIUM: CPT | Performed by: INTERNAL MEDICINE

## 2017-08-27 PROCEDURE — 82948 REAGENT STRIP/BLOOD GLUCOSE: CPT

## 2017-08-27 PROCEDURE — 71275 CT ANGIOGRAPHY CHEST: CPT

## 2017-08-27 PROCEDURE — 84484 ASSAY OF TROPONIN QUANT: CPT | Performed by: INTERNAL MEDICINE

## 2017-08-27 PROCEDURE — 84100 ASSAY OF PHOSPHORUS: CPT | Performed by: INTERNAL MEDICINE

## 2017-08-27 PROCEDURE — 93005 ELECTROCARDIOGRAM TRACING: CPT | Performed by: INTERNAL MEDICINE

## 2017-08-27 PROCEDURE — 82805 BLOOD GASES W/O2 SATURATION: CPT | Performed by: INTERNAL MEDICINE

## 2017-08-27 PROCEDURE — 85027 COMPLETE CBC AUTOMATED: CPT | Performed by: INTERNAL MEDICINE

## 2017-08-27 PROCEDURE — 80053 COMPREHEN METABOLIC PANEL: CPT | Performed by: INTERNAL MEDICINE

## 2017-08-27 PROCEDURE — 83880 ASSAY OF NATRIURETIC PEPTIDE: CPT | Performed by: INTERNAL MEDICINE

## 2017-08-27 PROCEDURE — 83036 HEMOGLOBIN GLYCOSYLATED A1C: CPT | Performed by: INTERNAL MEDICINE

## 2017-08-27 RX ORDER — FUROSEMIDE 10 MG/ML
40 INJECTION INTRAMUSCULAR; INTRAVENOUS DAILY
Status: DISCONTINUED | OUTPATIENT
Start: 2017-08-28 | End: 2017-08-30 | Stop reason: HOSPADM

## 2017-08-27 RX ORDER — FUROSEMIDE 10 MG/ML
40 INJECTION INTRAMUSCULAR; INTRAVENOUS ONCE
Status: COMPLETED | OUTPATIENT
Start: 2017-08-27 | End: 2017-08-27

## 2017-08-27 RX ORDER — DIPHENHYDRAMINE HYDROCHLORIDE 50 MG/ML
25 INJECTION INTRAMUSCULAR; INTRAVENOUS ONCE
Status: COMPLETED | OUTPATIENT
Start: 2017-08-27 | End: 2017-08-27

## 2017-08-27 RX ORDER — MAGNESIUM SULFATE HEPTAHYDRATE 40 MG/ML
2 INJECTION, SOLUTION INTRAVENOUS ONCE
Status: COMPLETED | OUTPATIENT
Start: 2017-08-27 | End: 2017-08-27

## 2017-08-27 RX ORDER — POTASSIUM CHLORIDE 14.9 MG/ML
20 INJECTION INTRAVENOUS EVERY 4 HOURS
Status: COMPLETED | OUTPATIENT
Start: 2017-08-27 | End: 2017-08-28

## 2017-08-27 RX ORDER — FUROSEMIDE 10 MG/ML
40 INJECTION INTRAMUSCULAR; INTRAVENOUS ONCE
Status: DISCONTINUED | OUTPATIENT
Start: 2017-08-27 | End: 2017-08-30 | Stop reason: HOSPADM

## 2017-08-27 RX ORDER — SODIUM CHLORIDE 9 MG/ML
100 INJECTION, SOLUTION INTRAVENOUS CONTINUOUS
Status: DISCONTINUED | OUTPATIENT
Start: 2017-08-27 | End: 2017-08-27

## 2017-08-27 RX ADMIN — VANCOMYCIN HYDROCHLORIDE 1000 MG: 5 INJECTION, POWDER, LYOPHILIZED, FOR SOLUTION INTRAVENOUS at 20:33

## 2017-08-27 RX ADMIN — LORAZEPAM 1 MG: 1 TABLET ORAL at 21:41

## 2017-08-27 RX ADMIN — VITAMIN D, TAB 1000IU (100/BT) 2000 UNITS: 25 TAB at 08:43

## 2017-08-27 RX ADMIN — MAGNESIUM SULFATE HEPTAHYDRATE 2 G: 40 INJECTION, SOLUTION INTRAVENOUS at 10:18

## 2017-08-27 RX ADMIN — DIPHENHYDRAMINE HYDROCHLORIDE 25 MG: 50 INJECTION, SOLUTION INTRAMUSCULAR; INTRAVENOUS at 21:41

## 2017-08-27 RX ADMIN — DEXTROSE, SODIUM CHLORIDE, AND POTASSIUM CHLORIDE 150 ML/HR: 5; .45; .3 INJECTION INTRAVENOUS at 05:20

## 2017-08-27 RX ADMIN — METOCLOPRAMIDE 10 MG: 5 INJECTION, SOLUTION INTRAMUSCULAR; INTRAVENOUS at 07:49

## 2017-08-27 RX ADMIN — POTASSIUM PHOSPHATE, MONOBASIC AND POTASSIUM PHOSPHATE, DIBASIC 15 MMOL: 224; 236 INJECTION, SOLUTION INTRAVENOUS at 09:01

## 2017-08-27 RX ADMIN — IOHEXOL 85 ML: 350 INJECTION, SOLUTION INTRAVENOUS at 08:36

## 2017-08-27 RX ADMIN — POTASSIUM CHLORIDE 20 MEQ: 200 INJECTION, SOLUTION INTRAVENOUS at 13:30

## 2017-08-27 RX ADMIN — LORAZEPAM 0.5 MG: 2 INJECTION INTRAMUSCULAR; INTRAVENOUS at 05:03

## 2017-08-27 RX ADMIN — METRONIDAZOLE 500 MG: 500 INJECTION, SOLUTION INTRAVENOUS at 01:01

## 2017-08-27 RX ADMIN — VANCOMYCIN HYDROCHLORIDE 1000 MG: 5 INJECTION, POWDER, LYOPHILIZED, FOR SOLUTION INTRAVENOUS at 04:40

## 2017-08-27 RX ADMIN — POTASSIUM CHLORIDE 20 MEQ: 200 INJECTION, SOLUTION INTRAVENOUS at 21:42

## 2017-08-27 RX ADMIN — FUROSEMIDE 40 MG: 10 INJECTION, SOLUTION INTRAMUSCULAR; INTRAVENOUS at 10:18

## 2017-08-27 RX ADMIN — PIPERACILLIN SODIUM,TAZOBACTAM SODIUM 3.38 G: 3; .375 INJECTION, POWDER, FOR SOLUTION INTRAVENOUS at 23:51

## 2017-08-27 RX ADMIN — METRONIDAZOLE 500 MG: 500 INJECTION, SOLUTION INTRAVENOUS at 09:01

## 2017-08-27 RX ADMIN — SODIUM CHLORIDE 1000 ML: 0.9 INJECTION, SOLUTION INTRAVENOUS at 06:20

## 2017-08-27 RX ADMIN — PREDNISONE 60 MG: 20 TABLET ORAL at 08:01

## 2017-08-27 RX ADMIN — DIPHENHYDRAMINE HYDROCHLORIDE 25 MG: 50 INJECTION, SOLUTION INTRAMUSCULAR; INTRAVENOUS at 08:15

## 2017-08-27 RX ADMIN — POTASSIUM CHLORIDE 20 MEQ: 200 INJECTION, SOLUTION INTRAVENOUS at 18:43

## 2017-08-27 RX ADMIN — PIPERACILLIN SODIUM,TAZOBACTAM SODIUM 3.38 G: 3; .375 INJECTION, POWDER, FOR SOLUTION INTRAVENOUS at 06:08

## 2017-08-27 RX ADMIN — DIPHENHYDRAMINE HYDROCHLORIDE 25 MG: 50 INJECTION, SOLUTION INTRAMUSCULAR; INTRAVENOUS at 04:28

## 2017-08-27 RX ADMIN — PIPERACILLIN SODIUM,TAZOBACTAM SODIUM 3.38 G: 3; .375 INJECTION, POWDER, FOR SOLUTION INTRAVENOUS at 18:06

## 2017-08-27 RX ADMIN — LEVOTHYROXINE SODIUM 50 MCG: 50 TABLET ORAL at 05:03

## 2017-08-27 RX ADMIN — PIPERACILLIN SODIUM,TAZOBACTAM SODIUM 3.38 G: 3; .375 INJECTION, POWDER, FOR SOLUTION INTRAVENOUS at 12:14

## 2017-08-27 RX ADMIN — MORPHINE SULFATE 4 MG: 4 INJECTION, SOLUTION INTRAMUSCULAR; INTRAVENOUS at 08:47

## 2017-08-27 RX ADMIN — MORPHINE SULFATE 4 MG: 4 INJECTION, SOLUTION INTRAMUSCULAR; INTRAVENOUS at 14:09

## 2017-08-27 RX ADMIN — VANCOMYCIN HYDROCHLORIDE 1000 MG: 5 INJECTION, POWDER, LYOPHILIZED, FOR SOLUTION INTRAVENOUS at 12:15

## 2017-08-28 PROBLEM — R79.89 ELEVATED TROPONIN LEVEL: Status: ACTIVE | Noted: 2017-08-28

## 2017-08-28 PROBLEM — R77.8 ELEVATED TROPONIN LEVEL: Status: ACTIVE | Noted: 2017-08-28

## 2017-08-28 LAB
ALBUMIN SERPL BCP-MCNC: 3.2 G/DL (ref 3.5–5)
ALP SERPL-CCNC: 60 U/L (ref 46–116)
ALT SERPL W P-5'-P-CCNC: 71 U/L (ref 12–78)
ANION GAP SERPL CALCULATED.3IONS-SCNC: 11 MMOL/L (ref 4–13)
AST SERPL W P-5'-P-CCNC: 20 U/L (ref 5–45)
ATRIAL RATE: 52 BPM
ATRIAL RATE: 65 BPM
BILIRUB SERPL-MCNC: 0.4 MG/DL (ref 0.2–1)
BUN SERPL-MCNC: 4 MG/DL (ref 5–25)
CALCIUM SERPL-MCNC: 8.4 MG/DL (ref 8.3–10.1)
CHLORIDE SERPL-SCNC: 104 MMOL/L (ref 100–108)
CO2 SERPL-SCNC: 28 MMOL/L (ref 21–32)
CREAT SERPL-MCNC: 1.01 MG/DL (ref 0.6–1.3)
ERYTHROCYTE [DISTWIDTH] IN BLOOD BY AUTOMATED COUNT: 13.9 % (ref 11.6–15.1)
GFR SERPL CREATININE-BSD FRML MDRD: 68 ML/MIN/1.73SQ M
GLUCOSE SERPL-MCNC: 101 MG/DL (ref 65–140)
GLUCOSE SERPL-MCNC: 103 MG/DL (ref 65–140)
GLUCOSE SERPL-MCNC: 170 MG/DL (ref 65–140)
HCT VFR BLD AUTO: 45.4 % (ref 34.8–46.1)
HEMOCCULT STL QL: POSITIVE
HGB BLD-MCNC: 15 G/DL (ref 11.5–15.4)
MAGNESIUM SERPL-MCNC: 2 MG/DL (ref 1.6–2.6)
MCH RBC QN AUTO: 31.4 PG (ref 26.8–34.3)
MCHC RBC AUTO-ENTMCNC: 33 G/DL (ref 31.4–37.4)
MCV RBC AUTO: 95 FL (ref 82–98)
NT-PROBNP SERPL-MCNC: 1614 PG/ML
P AXIS: 48 DEGREES
P AXIS: 5 DEGREES
PHOSPHATE SERPL-MCNC: 2.1 MG/DL (ref 2.7–4.5)
PLATELET # BLD AUTO: 368 THOUSANDS/UL (ref 149–390)
PMV BLD AUTO: 9.7 FL (ref 8.9–12.7)
POTASSIUM SERPL-SCNC: 3.1 MMOL/L (ref 3.5–5.3)
PR INTERVAL: 116 MS
PR INTERVAL: 122 MS
PROT SERPL-MCNC: 7.2 G/DL (ref 6.4–8.2)
QRS AXIS: 78 DEGREES
QRS AXIS: 95 DEGREES
QRSD INTERVAL: 76 MS
QRSD INTERVAL: 84 MS
QT INTERVAL: 428 MS
QT INTERVAL: 462 MS
QTC INTERVAL: 429 MS
QTC INTERVAL: 445 MS
RBC # BLD AUTO: 4.77 MILLION/UL (ref 3.81–5.12)
SODIUM SERPL-SCNC: 143 MMOL/L (ref 136–145)
T WAVE AXIS: 32 DEGREES
T WAVE AXIS: 33 DEGREES
TROPONIN I SERPL-MCNC: 0.1 NG/ML
TROPONIN I SERPL-MCNC: 0.1 NG/ML
VENTRICULAR RATE: 52 BPM
VENTRICULAR RATE: 65 BPM
WBC # BLD AUTO: 13.12 THOUSAND/UL (ref 4.31–10.16)

## 2017-08-28 PROCEDURE — 94640 AIRWAY INHALATION TREATMENT: CPT

## 2017-08-28 PROCEDURE — 83735 ASSAY OF MAGNESIUM: CPT | Performed by: INTERNAL MEDICINE

## 2017-08-28 PROCEDURE — 93005 ELECTROCARDIOGRAM TRACING: CPT | Performed by: INTERNAL MEDICINE

## 2017-08-28 PROCEDURE — 84484 ASSAY OF TROPONIN QUANT: CPT | Performed by: INTERNAL MEDICINE

## 2017-08-28 PROCEDURE — 82948 REAGENT STRIP/BLOOD GLUCOSE: CPT

## 2017-08-28 PROCEDURE — 94760 N-INVAS EAR/PLS OXIMETRY 1: CPT

## 2017-08-28 PROCEDURE — 85027 COMPLETE CBC AUTOMATED: CPT | Performed by: INTERNAL MEDICINE

## 2017-08-28 PROCEDURE — 84100 ASSAY OF PHOSPHORUS: CPT | Performed by: INTERNAL MEDICINE

## 2017-08-28 PROCEDURE — 80053 COMPREHEN METABOLIC PANEL: CPT | Performed by: INTERNAL MEDICINE

## 2017-08-28 PROCEDURE — 83880 ASSAY OF NATRIURETIC PEPTIDE: CPT | Performed by: INTERNAL MEDICINE

## 2017-08-28 RX ORDER — POTASSIUM CHLORIDE 14.9 MG/ML
20 INJECTION INTRAVENOUS
Status: DISCONTINUED | OUTPATIENT
Start: 2017-08-28 | End: 2017-08-28

## 2017-08-28 RX ORDER — ASPIRIN 325 MG
325 TABLET, DELAYED RELEASE (ENTERIC COATED) ORAL DAILY
Status: DISCONTINUED | OUTPATIENT
Start: 2017-08-28 | End: 2017-08-30 | Stop reason: HOSPADM

## 2017-08-28 RX ORDER — POTASSIUM CHLORIDE 20 MEQ/1
40 TABLET, EXTENDED RELEASE ORAL ONCE
Status: COMPLETED | OUTPATIENT
Start: 2017-08-28 | End: 2017-08-28

## 2017-08-28 RX ORDER — POTASSIUM CHLORIDE 14.9 MG/ML
20 INJECTION INTRAVENOUS EVERY 6 HOURS
Status: DISCONTINUED | OUTPATIENT
Start: 2017-08-28 | End: 2017-08-28

## 2017-08-28 RX ORDER — NITROGLYCERIN 0.4 MG/1
0.4 TABLET SUBLINGUAL
Status: DISCONTINUED | OUTPATIENT
Start: 2017-08-28 | End: 2017-08-30 | Stop reason: HOSPADM

## 2017-08-28 RX ADMIN — VANCOMYCIN HYDROCHLORIDE 1000 MG: 5 INJECTION, POWDER, LYOPHILIZED, FOR SOLUTION INTRAVENOUS at 04:40

## 2017-08-28 RX ADMIN — PIPERACILLIN SODIUM,TAZOBACTAM SODIUM 3.38 G: 3; .375 INJECTION, POWDER, FOR SOLUTION INTRAVENOUS at 18:30

## 2017-08-28 RX ADMIN — DIPHENHYDRAMINE HYDROCHLORIDE 25 MG: 50 INJECTION, SOLUTION INTRAMUSCULAR; INTRAVENOUS at 21:10

## 2017-08-28 RX ADMIN — LORAZEPAM 1 MG: 1 TABLET ORAL at 21:10

## 2017-08-28 RX ADMIN — PIPERACILLIN SODIUM,TAZOBACTAM SODIUM 3.38 G: 3; .375 INJECTION, POWDER, FOR SOLUTION INTRAVENOUS at 06:27

## 2017-08-28 RX ADMIN — POTASSIUM CHLORIDE 20 MEQ: 200 INJECTION, SOLUTION INTRAVENOUS at 08:35

## 2017-08-28 RX ADMIN — PREDNISONE 40 MG: 20 TABLET ORAL at 08:21

## 2017-08-28 RX ADMIN — VANCOMYCIN HYDROCHLORIDE 1000 MG: 5 INJECTION, POWDER, LYOPHILIZED, FOR SOLUTION INTRAVENOUS at 12:45

## 2017-08-28 RX ADMIN — MORPHINE SULFATE 4 MG: 4 INJECTION, SOLUTION INTRAMUSCULAR; INTRAVENOUS at 18:30

## 2017-08-28 RX ADMIN — MORPHINE SULFATE 4 MG: 4 INJECTION, SOLUTION INTRAMUSCULAR; INTRAVENOUS at 07:22

## 2017-08-28 RX ADMIN — VANCOMYCIN HYDROCHLORIDE 1000 MG: 5 INJECTION, POWDER, LYOPHILIZED, FOR SOLUTION INTRAVENOUS at 21:09

## 2017-08-28 RX ADMIN — DIPHENHYDRAMINE HYDROCHLORIDE 25 MG: 50 INJECTION, SOLUTION INTRAMUSCULAR; INTRAVENOUS at 03:44

## 2017-08-28 RX ADMIN — ASPIRIN 325 MG: 325 TABLET, DELAYED RELEASE ORAL at 10:19

## 2017-08-28 RX ADMIN — POTASSIUM CHLORIDE 20 MEQ: 200 INJECTION, SOLUTION INTRAVENOUS at 11:13

## 2017-08-28 RX ADMIN — METOCLOPRAMIDE 10 MG: 5 INJECTION, SOLUTION INTRAMUSCULAR; INTRAVENOUS at 07:35

## 2017-08-28 RX ADMIN — LEVALBUTEROL 1.25 MG: 1.25 SOLUTION, CONCENTRATE RESPIRATORY (INHALATION) at 04:00

## 2017-08-28 RX ADMIN — ISODIUM CHLORIDE 3 ML: 0.03 SOLUTION RESPIRATORY (INHALATION) at 04:00

## 2017-08-28 RX ADMIN — LEVOTHYROXINE SODIUM 50 MCG: 50 TABLET ORAL at 06:27

## 2017-08-28 RX ADMIN — ENOXAPARIN SODIUM 40 MG: 40 INJECTION SUBCUTANEOUS at 10:19

## 2017-08-28 RX ADMIN — PIPERACILLIN SODIUM,TAZOBACTAM SODIUM 3.38 G: 3; .375 INJECTION, POWDER, FOR SOLUTION INTRAVENOUS at 12:45

## 2017-08-28 RX ADMIN — POTASSIUM CHLORIDE 20 MEQ: 200 INJECTION, SOLUTION INTRAVENOUS at 16:37

## 2017-08-28 RX ADMIN — POTASSIUM PHOSPHATE, MONOBASIC AND POTASSIUM PHOSPHATE, DIBASIC 15 MMOL: 224; 236 INJECTION, SOLUTION INTRAVENOUS at 10:20

## 2017-08-28 RX ADMIN — LORAZEPAM 0.5 MG: 2 INJECTION INTRAMUSCULAR; INTRAVENOUS at 12:47

## 2017-08-28 RX ADMIN — POTASSIUM CHLORIDE 40 MEQ: 1500 TABLET, EXTENDED RELEASE ORAL at 21:10

## 2017-08-28 RX ADMIN — FUROSEMIDE 40 MG: 10 INJECTION, SOLUTION INTRAMUSCULAR; INTRAVENOUS at 08:21

## 2017-08-28 RX ADMIN — VITAMIN D, TAB 1000IU (100/BT) 2000 UNITS: 25 TAB at 08:21

## 2017-08-29 ENCOUNTER — APPOINTMENT (INPATIENT)
Dept: RADIOLOGY | Facility: HOSPITAL | Age: 42
DRG: 871 | End: 2017-08-29
Payer: COMMERCIAL

## 2017-08-29 LAB
ALBUMIN SERPL BCP-MCNC: 2.6 G/DL (ref 3.5–5)
ALP SERPL-CCNC: 43 U/L (ref 46–116)
ALT SERPL W P-5'-P-CCNC: 46 U/L (ref 12–78)
ANION GAP SERPL CALCULATED.3IONS-SCNC: 9 MMOL/L (ref 4–13)
AST SERPL W P-5'-P-CCNC: 15 U/L (ref 5–45)
BACTERIA BLD CULT: NORMAL
BACTERIA BLD CULT: NORMAL
BILIRUB SERPL-MCNC: 0.4 MG/DL (ref 0.2–1)
BUN SERPL-MCNC: 10 MG/DL (ref 5–25)
CALCIUM SERPL-MCNC: 8 MG/DL (ref 8.3–10.1)
CHLORIDE SERPL-SCNC: 106 MMOL/L (ref 100–108)
CO2 SERPL-SCNC: 26 MMOL/L (ref 21–32)
CREAT SERPL-MCNC: 0.84 MG/DL (ref 0.6–1.3)
ERYTHROCYTE [DISTWIDTH] IN BLOOD BY AUTOMATED COUNT: 13.6 % (ref 11.6–15.1)
GFR SERPL CREATININE-BSD FRML MDRD: 85 ML/MIN/1.73SQ M
GLUCOSE SERPL-MCNC: 75 MG/DL (ref 65–140)
HCT VFR BLD AUTO: 39.9 % (ref 34.8–46.1)
HGB BLD-MCNC: 13.5 G/DL (ref 11.5–15.4)
MAGNESIUM SERPL-MCNC: 2 MG/DL (ref 1.6–2.6)
MCH RBC QN AUTO: 31.9 PG (ref 26.8–34.3)
MCHC RBC AUTO-ENTMCNC: 33.8 G/DL (ref 31.4–37.4)
MCV RBC AUTO: 94 FL (ref 82–98)
PHOSPHATE SERPL-MCNC: 3.5 MG/DL (ref 2.7–4.5)
PLATELET # BLD AUTO: 335 THOUSANDS/UL (ref 149–390)
PMV BLD AUTO: 9.9 FL (ref 8.9–12.7)
POTASSIUM SERPL-SCNC: 3.8 MMOL/L (ref 3.5–5.3)
PROT SERPL-MCNC: 6.1 G/DL (ref 6.4–8.2)
RBC # BLD AUTO: 4.23 MILLION/UL (ref 3.81–5.12)
SODIUM SERPL-SCNC: 141 MMOL/L (ref 136–145)
VANCOMYCIN TROUGH SERPL-MCNC: 23.8 UG/ML (ref 10–20)
WBC # BLD AUTO: 13.79 THOUSAND/UL (ref 4.31–10.16)

## 2017-08-29 PROCEDURE — 80053 COMPREHEN METABOLIC PANEL: CPT | Performed by: INTERNAL MEDICINE

## 2017-08-29 PROCEDURE — 80202 ASSAY OF VANCOMYCIN: CPT | Performed by: INTERNAL MEDICINE

## 2017-08-29 PROCEDURE — 83735 ASSAY OF MAGNESIUM: CPT | Performed by: INTERNAL MEDICINE

## 2017-08-29 PROCEDURE — 85027 COMPLETE CBC AUTOMATED: CPT | Performed by: INTERNAL MEDICINE

## 2017-08-29 PROCEDURE — 84100 ASSAY OF PHOSPHORUS: CPT | Performed by: INTERNAL MEDICINE

## 2017-08-29 PROCEDURE — 71020 HB CHEST X-RAY 2VW FRONTAL&LATL: CPT

## 2017-08-29 RX ADMIN — PIPERACILLIN SODIUM,TAZOBACTAM SODIUM 3.38 G: 3; .375 INJECTION, POWDER, FOR SOLUTION INTRAVENOUS at 00:07

## 2017-08-29 RX ADMIN — MORPHINE SULFATE 4 MG: 4 INJECTION, SOLUTION INTRAMUSCULAR; INTRAVENOUS at 04:12

## 2017-08-29 RX ADMIN — PIPERACILLIN SODIUM,TAZOBACTAM SODIUM 3.38 G: 3; .375 INJECTION, POWDER, FOR SOLUTION INTRAVENOUS at 11:58

## 2017-08-29 RX ADMIN — LORAZEPAM 1 MG: 1 TABLET ORAL at 21:44

## 2017-08-29 RX ADMIN — MORPHINE SULFATE 4 MG: 4 INJECTION, SOLUTION INTRAMUSCULAR; INTRAVENOUS at 17:33

## 2017-08-29 RX ADMIN — VANCOMYCIN HYDROCHLORIDE 1000 MG: 1 INJECTION, POWDER, LYOPHILIZED, FOR SOLUTION INTRAVENOUS at 17:33

## 2017-08-29 RX ADMIN — ENOXAPARIN SODIUM 40 MG: 40 INJECTION SUBCUTANEOUS at 08:48

## 2017-08-29 RX ADMIN — LEVOTHYROXINE SODIUM 50 MCG: 50 TABLET ORAL at 05:54

## 2017-08-29 RX ADMIN — VANCOMYCIN HYDROCHLORIDE 1000 MG: 5 INJECTION, POWDER, LYOPHILIZED, FOR SOLUTION INTRAVENOUS at 04:12

## 2017-08-29 RX ADMIN — METOCLOPRAMIDE 10 MG: 5 INJECTION, SOLUTION INTRAMUSCULAR; INTRAVENOUS at 08:49

## 2017-08-29 RX ADMIN — VITAMIN D, TAB 1000IU (100/BT) 2000 UNITS: 25 TAB at 08:47

## 2017-08-29 RX ADMIN — OXYCODONE HYDROCHLORIDE 5 MG: 5 TABLET ORAL at 16:36

## 2017-08-29 RX ADMIN — MORPHINE SULFATE 4 MG: 4 INJECTION, SOLUTION INTRAMUSCULAR; INTRAVENOUS at 08:48

## 2017-08-29 RX ADMIN — PIPERACILLIN SODIUM,TAZOBACTAM SODIUM 3.38 G: 3; .375 INJECTION, POWDER, FOR SOLUTION INTRAVENOUS at 19:30

## 2017-08-29 RX ADMIN — MORPHINE SULFATE 4 MG: 4 INJECTION, SOLUTION INTRAMUSCULAR; INTRAVENOUS at 14:20

## 2017-08-29 RX ADMIN — PIPERACILLIN SODIUM,TAZOBACTAM SODIUM 3.38 G: 3; .375 INJECTION, POWDER, FOR SOLUTION INTRAVENOUS at 05:54

## 2017-08-29 RX ADMIN — ASPIRIN 325 MG: 325 TABLET, DELAYED RELEASE ORAL at 08:47

## 2017-08-29 RX ADMIN — FUROSEMIDE 40 MG: 10 INJECTION, SOLUTION INTRAMUSCULAR; INTRAVENOUS at 08:50

## 2017-08-29 RX ADMIN — PREDNISONE 40 MG: 20 TABLET ORAL at 08:47

## 2017-08-29 RX ADMIN — DIPHENHYDRAMINE HYDROCHLORIDE 25 MG: 50 INJECTION, SOLUTION INTRAMUSCULAR; INTRAVENOUS at 04:12

## 2017-08-29 RX ADMIN — MORPHINE SULFATE 4 MG: 4 INJECTION, SOLUTION INTRAMUSCULAR; INTRAVENOUS at 23:41

## 2017-08-29 RX ADMIN — DIPHENHYDRAMINE HYDROCHLORIDE 25 MG: 50 INJECTION, SOLUTION INTRAMUSCULAR; INTRAVENOUS at 21:45

## 2017-08-30 VITALS
OXYGEN SATURATION: 93 % | TEMPERATURE: 97.8 F | RESPIRATION RATE: 18 BRPM | WEIGHT: 135.58 LBS | HEART RATE: 69 BPM | HEIGHT: 65 IN | BODY MASS INDEX: 22.59 KG/M2 | SYSTOLIC BLOOD PRESSURE: 101 MMHG | DIASTOLIC BLOOD PRESSURE: 64 MMHG

## 2017-08-30 PROBLEM — I50.31 ACUTE DIASTOLIC CHF (CONGESTIVE HEART FAILURE) (HCC): Status: RESOLVED | Noted: 2017-08-27 | Resolved: 2017-08-30

## 2017-08-30 LAB
ANION GAP SERPL CALCULATED.3IONS-SCNC: 8 MMOL/L (ref 4–13)
BASOPHILS # BLD AUTO: 0.02 THOUSANDS/ΜL (ref 0–0.1)
BASOPHILS NFR BLD AUTO: 0 % (ref 0–1)
BUN SERPL-MCNC: 18 MG/DL (ref 5–25)
CALCIUM SERPL-MCNC: 8.6 MG/DL (ref 8.3–10.1)
CHLORIDE SERPL-SCNC: 103 MMOL/L (ref 100–108)
CO2 SERPL-SCNC: 28 MMOL/L (ref 21–32)
CREAT SERPL-MCNC: 0.98 MG/DL (ref 0.6–1.3)
EOSINOPHIL # BLD AUTO: 0.1 THOUSAND/ΜL (ref 0–0.61)
EOSINOPHIL NFR BLD AUTO: 1 % (ref 0–6)
ERYTHROCYTE [DISTWIDTH] IN BLOOD BY AUTOMATED COUNT: 13.2 % (ref 11.6–15.1)
GFR SERPL CREATININE-BSD FRML MDRD: 70 ML/MIN/1.73SQ M
GLUCOSE SERPL-MCNC: 72 MG/DL (ref 65–140)
HCT VFR BLD AUTO: 41.5 % (ref 34.8–46.1)
HGB BLD-MCNC: 13.9 G/DL (ref 11.5–15.4)
LYMPHOCYTES # BLD AUTO: 2.4 THOUSANDS/ΜL (ref 0.6–4.47)
LYMPHOCYTES NFR BLD AUTO: 15 % (ref 14–44)
MCH RBC QN AUTO: 31.4 PG (ref 26.8–34.3)
MCHC RBC AUTO-ENTMCNC: 33.5 G/DL (ref 31.4–37.4)
MCV RBC AUTO: 94 FL (ref 82–98)
MONOCYTES # BLD AUTO: 1.39 THOUSAND/ΜL (ref 0.17–1.22)
MONOCYTES NFR BLD AUTO: 8 % (ref 4–12)
NEUTROPHILS # BLD AUTO: 12.63 THOUSANDS/ΜL (ref 1.85–7.62)
NEUTS SEG NFR BLD AUTO: 76 % (ref 43–75)
PLATELET # BLD AUTO: 350 THOUSANDS/UL (ref 149–390)
PMV BLD AUTO: 10.1 FL (ref 8.9–12.7)
POTASSIUM SERPL-SCNC: 3.5 MMOL/L (ref 3.5–5.3)
RBC # BLD AUTO: 4.42 MILLION/UL (ref 3.81–5.12)
SODIUM SERPL-SCNC: 139 MMOL/L (ref 136–145)
TRYPTASE SERPL-MCNC: 4.7 UG/L (ref 2.2–13.2)
WBC # BLD AUTO: 16.54 THOUSAND/UL (ref 4.31–10.16)

## 2017-08-30 PROCEDURE — 80048 BASIC METABOLIC PNL TOTAL CA: CPT | Performed by: FAMILY MEDICINE

## 2017-08-30 PROCEDURE — 85025 COMPLETE CBC W/AUTO DIFF WBC: CPT | Performed by: FAMILY MEDICINE

## 2017-08-30 RX ORDER — LEVOTHYROXINE SODIUM 0.07 MG/1
75 TABLET ORAL DAILY
Qty: 30 TABLET | Refills: 0 | Status: SHIPPED | OUTPATIENT
Start: 2017-08-30 | End: 2018-10-05

## 2017-08-30 RX ORDER — AMOXICILLIN AND CLAVULANATE POTASSIUM 875; 125 MG/1; MG/1
1 TABLET, FILM COATED ORAL EVERY 12 HOURS SCHEDULED
Status: DISCONTINUED | OUTPATIENT
Start: 2017-08-30 | End: 2017-08-30 | Stop reason: HOSPADM

## 2017-08-30 RX ORDER — PREDNISONE 20 MG/1
TABLET ORAL
Qty: 60 TABLET | Refills: 0 | Status: SHIPPED | OUTPATIENT
Start: 2017-08-30 | End: 2017-09-29 | Stop reason: HOSPADM

## 2017-08-30 RX ORDER — AMOXICILLIN AND CLAVULANATE POTASSIUM 875; 125 MG/1; MG/1
1 TABLET, FILM COATED ORAL EVERY 12 HOURS SCHEDULED
Qty: 16 TABLET | Refills: 0 | Status: SHIPPED | OUTPATIENT
Start: 2017-08-30 | End: 2017-09-07

## 2017-08-30 RX ADMIN — ENOXAPARIN SODIUM 40 MG: 40 INJECTION SUBCUTANEOUS at 09:05

## 2017-08-30 RX ADMIN — PIPERACILLIN SODIUM,TAZOBACTAM SODIUM 3.38 G: 3; .375 INJECTION, POWDER, FOR SOLUTION INTRAVENOUS at 00:47

## 2017-08-30 RX ADMIN — VANCOMYCIN HYDROCHLORIDE 1000 MG: 1 INJECTION, POWDER, LYOPHILIZED, FOR SOLUTION INTRAVENOUS at 04:53

## 2017-08-30 RX ADMIN — DIPHENHYDRAMINE HYDROCHLORIDE 25 MG: 50 INJECTION, SOLUTION INTRAMUSCULAR; INTRAVENOUS at 04:52

## 2017-08-30 RX ADMIN — VITAMIN D, TAB 1000IU (100/BT) 2000 UNITS: 25 TAB at 09:04

## 2017-08-30 RX ADMIN — FUROSEMIDE 40 MG: 10 INJECTION, SOLUTION INTRAMUSCULAR; INTRAVENOUS at 09:06

## 2017-08-30 RX ADMIN — PREDNISONE 40 MG: 20 TABLET ORAL at 09:06

## 2017-08-30 RX ADMIN — AMOXICILLIN AND CLAVULANATE POTASSIUM 1 TABLET: 875; 125 TABLET, FILM COATED ORAL at 09:30

## 2017-08-30 RX ADMIN — ASPIRIN 325 MG: 325 TABLET, DELAYED RELEASE ORAL at 09:04

## 2017-08-30 RX ADMIN — PIPERACILLIN SODIUM,TAZOBACTAM SODIUM 3.38 G: 3; .375 INJECTION, POWDER, FOR SOLUTION INTRAVENOUS at 06:30

## 2017-08-30 RX ADMIN — LEVOTHYROXINE SODIUM 50 MCG: 50 TABLET ORAL at 05:00

## 2017-08-31 ENCOUNTER — TRANSCRIBE ORDERS (OUTPATIENT)
Dept: ADMINISTRATIVE | Facility: HOSPITAL | Age: 42
End: 2017-08-31

## 2017-08-31 DIAGNOSIS — R07.89 OTHER CHEST PAIN: Primary | ICD-10-CM

## 2017-09-09 ENCOUNTER — APPOINTMENT (EMERGENCY)
Dept: ULTRASOUND IMAGING | Facility: HOSPITAL | Age: 42
End: 2017-09-09
Payer: COMMERCIAL

## 2017-09-09 ENCOUNTER — APPOINTMENT (EMERGENCY)
Dept: CT IMAGING | Facility: HOSPITAL | Age: 42
End: 2017-09-09
Payer: COMMERCIAL

## 2017-09-09 ENCOUNTER — APPOINTMENT (EMERGENCY)
Dept: RADIOLOGY | Facility: HOSPITAL | Age: 42
End: 2017-09-09
Payer: COMMERCIAL

## 2017-09-09 ENCOUNTER — HOSPITAL ENCOUNTER (EMERGENCY)
Facility: HOSPITAL | Age: 42
End: 2017-09-10
Attending: EMERGENCY MEDICINE | Admitting: EMERGENCY MEDICINE
Payer: COMMERCIAL

## 2017-09-09 DIAGNOSIS — I80.8 THROMBOPHLEBITIS ARM: ICD-10-CM

## 2017-09-09 DIAGNOSIS — R50.9 FEVER: ICD-10-CM

## 2017-09-09 DIAGNOSIS — R93.5 ABNORMAL ABDOMINAL CT SCAN: ICD-10-CM

## 2017-09-09 DIAGNOSIS — R10.11 RUQ ABDOMINAL PAIN: Primary | ICD-10-CM

## 2017-09-09 LAB
ALBUMIN SERPL BCP-MCNC: 3.4 G/DL (ref 3.5–5)
ALP SERPL-CCNC: 67 U/L (ref 46–116)
ALT SERPL W P-5'-P-CCNC: 37 U/L (ref 12–78)
ANION GAP SERPL CALCULATED.3IONS-SCNC: 9 MMOL/L (ref 4–13)
AST SERPL W P-5'-P-CCNC: 13 U/L (ref 5–45)
BASOPHILS # BLD MANUAL: 0 THOUSAND/UL (ref 0–0.1)
BASOPHILS NFR MAR MANUAL: 0 % (ref 0–1)
BILIRUB SERPL-MCNC: 0.2 MG/DL (ref 0.2–1)
BUN SERPL-MCNC: 21 MG/DL (ref 5–25)
CALCIUM SERPL-MCNC: 8.7 MG/DL (ref 8.3–10.1)
CHLORIDE SERPL-SCNC: 104 MMOL/L (ref 100–108)
CO2 SERPL-SCNC: 27 MMOL/L (ref 21–32)
CREAT SERPL-MCNC: 0.81 MG/DL (ref 0.6–1.3)
EOSINOPHIL # BLD MANUAL: 0 THOUSAND/UL (ref 0–0.4)
EOSINOPHIL NFR BLD MANUAL: 0 % (ref 0–6)
ERYTHROCYTE [DISTWIDTH] IN BLOOD BY AUTOMATED COUNT: 13.2 % (ref 11.6–15.1)
GFR SERPL CREATININE-BSD FRML MDRD: 89 ML/MIN/1.73SQ M
GLUCOSE SERPL-MCNC: 111 MG/DL (ref 65–140)
HCT VFR BLD AUTO: 42.4 % (ref 34.8–46.1)
HGB BLD-MCNC: 14.1 G/DL (ref 11.5–15.4)
HOLD SPECIMEN: NORMAL
LACTATE SERPL-SCNC: 1.6 MMOL/L (ref 0.5–2)
LIPASE SERPL-CCNC: 176 U/L (ref 73–393)
LYMPHOCYTES # BLD AUTO: 0.35 THOUSAND/UL (ref 0.6–4.47)
LYMPHOCYTES # BLD AUTO: 3 % (ref 14–44)
MAGNESIUM SERPL-MCNC: 2 MG/DL (ref 1.6–2.6)
MCH RBC QN AUTO: 32 PG (ref 26.8–34.3)
MCHC RBC AUTO-ENTMCNC: 33.3 G/DL (ref 31.4–37.4)
MCV RBC AUTO: 96 FL (ref 82–98)
MONOCYTES # BLD AUTO: 0.35 THOUSAND/UL (ref 0–1.22)
MONOCYTES NFR BLD: 3 % (ref 4–12)
NEUTROPHILS # BLD MANUAL: 10.82 THOUSAND/UL (ref 1.85–7.62)
NEUTS BAND NFR BLD MANUAL: 1 % (ref 0–8)
NEUTS SEG NFR BLD AUTO: 92 % (ref 43–75)
PLATELET # BLD AUTO: 435 THOUSANDS/UL (ref 149–390)
PLATELET BLD QL SMEAR: ABNORMAL
PMV BLD AUTO: 9.2 FL (ref 8.9–12.7)
POTASSIUM SERPL-SCNC: 4.2 MMOL/L (ref 3.5–5.3)
PROT SERPL-MCNC: 7 G/DL (ref 6.4–8.2)
RBC # BLD AUTO: 4.4 MILLION/UL (ref 3.81–5.12)
RBC MORPH BLD: NORMAL
SODIUM SERPL-SCNC: 140 MMOL/L (ref 136–145)
TOTAL CELLS COUNTED SPEC: 100
TROPONIN I SERPL-MCNC: <0.02 NG/ML
VARIANT LYMPHS # BLD AUTO: 1 %
WBC # BLD AUTO: 11.63 THOUSAND/UL (ref 4.31–10.16)

## 2017-09-09 PROCEDURE — 96365 THER/PROPH/DIAG IV INF INIT: CPT

## 2017-09-09 PROCEDURE — 83690 ASSAY OF LIPASE: CPT | Performed by: EMERGENCY MEDICINE

## 2017-09-09 PROCEDURE — 74177 CT ABD & PELVIS W/CONTRAST: CPT

## 2017-09-09 PROCEDURE — 93971 EXTREMITY STUDY: CPT

## 2017-09-09 PROCEDURE — 70450 CT HEAD/BRAIN W/O DYE: CPT

## 2017-09-09 PROCEDURE — 36415 COLL VENOUS BLD VENIPUNCTURE: CPT | Performed by: EMERGENCY MEDICINE

## 2017-09-09 PROCEDURE — 96361 HYDRATE IV INFUSION ADD-ON: CPT

## 2017-09-09 PROCEDURE — 96375 TX/PRO/DX INJ NEW DRUG ADDON: CPT

## 2017-09-09 PROCEDURE — 87040 BLOOD CULTURE FOR BACTERIA: CPT | Performed by: EMERGENCY MEDICINE

## 2017-09-09 PROCEDURE — 76705 ECHO EXAM OF ABDOMEN: CPT

## 2017-09-09 PROCEDURE — 71010 HB CHEST X-RAY 1 VIEW FRONTAL (PORTABLE): CPT

## 2017-09-09 PROCEDURE — 84484 ASSAY OF TROPONIN QUANT: CPT | Performed by: EMERGENCY MEDICINE

## 2017-09-09 PROCEDURE — 85007 BL SMEAR W/DIFF WBC COUNT: CPT | Performed by: EMERGENCY MEDICINE

## 2017-09-09 PROCEDURE — 93005 ELECTROCARDIOGRAM TRACING: CPT | Performed by: EMERGENCY MEDICINE

## 2017-09-09 PROCEDURE — 85027 COMPLETE CBC AUTOMATED: CPT | Performed by: EMERGENCY MEDICINE

## 2017-09-09 PROCEDURE — 83735 ASSAY OF MAGNESIUM: CPT | Performed by: EMERGENCY MEDICINE

## 2017-09-09 PROCEDURE — 96376 TX/PRO/DX INJ SAME DRUG ADON: CPT

## 2017-09-09 PROCEDURE — 83605 ASSAY OF LACTIC ACID: CPT | Performed by: EMERGENCY MEDICINE

## 2017-09-09 PROCEDURE — 80053 COMPREHEN METABOLIC PANEL: CPT | Performed by: EMERGENCY MEDICINE

## 2017-09-09 RX ORDER — PIPERACILLIN SODIUM, TAZOBACTAM SODIUM 2; .25 G/10ML; G/10ML
INJECTION, POWDER, LYOPHILIZED, FOR SOLUTION INTRAVENOUS
Status: DISCONTINUED
Start: 2017-09-09 | End: 2017-09-10 | Stop reason: HOSPADM

## 2017-09-09 RX ORDER — ONDANSETRON 2 MG/ML
4 INJECTION INTRAMUSCULAR; INTRAVENOUS ONCE
Status: COMPLETED | OUTPATIENT
Start: 2017-09-09 | End: 2017-09-09

## 2017-09-09 RX ORDER — ASPIRIN 325 MG
325 TABLET ORAL DAILY
COMMUNITY
End: 2017-12-06

## 2017-09-09 RX ORDER — PREDNISONE 50 MG/1
50 TABLET ORAL DAILY
COMMUNITY
Start: 2017-09-09 | End: 2017-09-29 | Stop reason: HOSPADM

## 2017-09-09 RX ADMIN — ONDANSETRON 4 MG: 2 INJECTION INTRAMUSCULAR; INTRAVENOUS at 20:02

## 2017-09-09 RX ADMIN — FAMOTIDINE 20 MG: 10 INJECTION, SOLUTION INTRAVENOUS at 20:28

## 2017-09-09 RX ADMIN — HYDROMORPHONE HYDROCHLORIDE 1 MG: 1 INJECTION, SOLUTION INTRAMUSCULAR; INTRAVENOUS; SUBCUTANEOUS at 21:06

## 2017-09-09 RX ADMIN — IOHEXOL 100 ML: 350 INJECTION, SOLUTION INTRAVENOUS at 22:18

## 2017-09-09 RX ADMIN — HYDROMORPHONE HYDROCHLORIDE 1 MG: 1 INJECTION, SOLUTION INTRAMUSCULAR; INTRAVENOUS; SUBCUTANEOUS at 20:03

## 2017-09-09 RX ADMIN — HYDROMORPHONE HYDROCHLORIDE 1 MG: 1 INJECTION, SOLUTION INTRAMUSCULAR; INTRAVENOUS; SUBCUTANEOUS at 23:45

## 2017-09-09 RX ADMIN — PIPERACILLIN SODIUM AND TAZOBACTAM SODIUM 4.5 G: 4; .5 INJECTION, POWDER, FOR SOLUTION INTRAVENOUS at 23:47

## 2017-09-09 RX ADMIN — SODIUM CHLORIDE 1974 ML: 0.9 INJECTION, SOLUTION INTRAVENOUS at 20:00

## 2017-09-10 ENCOUNTER — HOSPITAL ENCOUNTER (INPATIENT)
Facility: HOSPITAL | Age: 42
LOS: 19 days | Discharge: HOME | DRG: 392 | End: 2017-09-29
Attending: HOSPITALIST | Admitting: HOSPITALIST
Payer: COMMERCIAL

## 2017-09-10 VITALS
BODY MASS INDEX: 24.16 KG/M2 | TEMPERATURE: 98.4 F | DIASTOLIC BLOOD PRESSURE: 70 MMHG | SYSTOLIC BLOOD PRESSURE: 107 MMHG | HEIGHT: 65 IN | WEIGHT: 145 LBS | OXYGEN SATURATION: 98 % | RESPIRATION RATE: 18 BRPM | HEART RATE: 74 BPM

## 2017-09-10 DIAGNOSIS — N80.9 ENDOMETRIOSIS: ICD-10-CM

## 2017-09-10 DIAGNOSIS — R93.5 ABNORMAL CT OF THE ABDOMEN: ICD-10-CM

## 2017-09-10 DIAGNOSIS — R10.11 RUQ PAIN: Primary | ICD-10-CM

## 2017-09-10 DIAGNOSIS — K52.9 COLITIS: ICD-10-CM

## 2017-09-10 DIAGNOSIS — R50.9 FEBRILE ILLNESS: ICD-10-CM

## 2017-09-10 DIAGNOSIS — R10.9 ABDOMINAL PAIN: ICD-10-CM

## 2017-09-10 PROBLEM — M79.89 ARM SWELLING: Status: ACTIVE | Noted: 2017-09-10

## 2017-09-10 PROBLEM — D72.829 LEUKOCYTOSIS: Status: ACTIVE | Noted: 2017-09-10

## 2017-09-10 PROBLEM — E03.9 ACQUIRED HYPOTHYROIDISM: Chronic | Status: ACTIVE | Noted: 2017-08-26

## 2017-09-10 PROBLEM — I80.9 THROMBOPHLEBITIS: Status: ACTIVE | Noted: 2017-09-10

## 2017-09-10 LAB
ALBUMIN SERPL BCP-MCNC: 2.8 G/DL (ref 3.5–5)
ALP SERPL-CCNC: 45 U/L (ref 46–116)
ALT SERPL W P-5'-P-CCNC: 26 U/L (ref 12–78)
ANION GAP SERPL CALCULATED.3IONS-SCNC: 6 MMOL/L (ref 4–13)
AST SERPL W P-5'-P-CCNC: 10 U/L (ref 5–45)
BACTERIA UR QL AUTO: ABNORMAL /HPF
BILIRUB DIRECT SERPL-MCNC: 0.22 MG/DL (ref 0–0.2)
BILIRUB SERPL-MCNC: 0.76 MG/DL (ref 0.2–1)
BILIRUB UR QL STRIP: NEGATIVE
BUN SERPL-MCNC: 13 MG/DL (ref 5–25)
CALCIUM SERPL-MCNC: 8 MG/DL (ref 8.3–10.1)
CHLORIDE SERPL-SCNC: 107 MMOL/L (ref 100–108)
CLARITY UR: CLEAR
CO2 SERPL-SCNC: 27 MMOL/L (ref 21–32)
COLOR UR: YELLOW
CREAT SERPL-MCNC: 0.71 MG/DL (ref 0.6–1.3)
ERYTHROCYTE [DISTWIDTH] IN BLOOD BY AUTOMATED COUNT: 13.5 % (ref 11.6–15.1)
GFR SERPL CREATININE-BSD FRML MDRD: 104 ML/MIN/1.73SQ M
GLUCOSE SERPL-MCNC: 82 MG/DL (ref 65–140)
GLUCOSE UR STRIP-MCNC: NEGATIVE MG/DL
HCT VFR BLD AUTO: 38.8 % (ref 34.8–46.1)
HGB BLD-MCNC: 12.8 G/DL (ref 11.5–15.4)
HGB UR QL STRIP.AUTO: NORMAL
KETONES UR STRIP-MCNC: NEGATIVE MG/DL
LACTATE SERPL-SCNC: 1.5 MMOL/L (ref 0.5–2)
LEUKOCYTE ESTERASE UR QL STRIP: NEGATIVE
MCH RBC QN AUTO: 31.8 PG (ref 26.8–34.3)
MCHC RBC AUTO-ENTMCNC: 33 G/DL (ref 31.4–37.4)
MCV RBC AUTO: 96 FL (ref 82–98)
NITRITE UR QL STRIP: NEGATIVE
NON-SQ EPI CELLS URNS QL MICRO: ABNORMAL /HPF
PH UR STRIP.AUTO: 7 [PH] (ref 4.5–8)
PLATELET # BLD AUTO: 389 THOUSANDS/UL (ref 149–390)
PMV BLD AUTO: 9 FL (ref 8.9–12.7)
POTASSIUM SERPL-SCNC: 3.6 MMOL/L (ref 3.5–5.3)
PROT SERPL-MCNC: 6 G/DL (ref 6.4–8.2)
PROT UR STRIP-MCNC: NEGATIVE MG/DL
RBC # BLD AUTO: 4.03 MILLION/UL (ref 3.81–5.12)
RBC #/AREA URNS AUTO: ABNORMAL /HPF
SODIUM SERPL-SCNC: 140 MMOL/L (ref 136–145)
SP GR UR STRIP.AUTO: 1.01 (ref 1–1.03)
TSH 30M P TRH SERPL-ACNC: 3.33 UIU/ML
TSH SERPL DL<=0.05 MIU/L-ACNC: 3.2 UIU/ML
UROBILINOGEN UR QL STRIP.AUTO: 0.2 E.U./DL
WBC # BLD AUTO: 17.57 THOUSAND/UL (ref 4.31–10.16)
WBC #/AREA URNS AUTO: ABNORMAL /HPF

## 2017-09-10 PROCEDURE — 99285 EMERGENCY DEPT VISIT HI MDM: CPT

## 2017-09-10 PROCEDURE — 80048 BASIC METABOLIC PNL TOTAL CA: CPT | Performed by: HOSPITALIST

## 2017-09-10 PROCEDURE — 85027 COMPLETE CBC AUTOMATED: CPT | Performed by: HOSPITALIST

## 2017-09-10 PROCEDURE — C9113 INJ PANTOPRAZOLE SODIUM, VIA: HCPCS | Performed by: HOSPITALIST

## 2017-09-10 PROCEDURE — 80076 HEPATIC FUNCTION PANEL: CPT | Performed by: SURGERY

## 2017-09-10 PROCEDURE — 96375 TX/PRO/DX INJ NEW DRUG ADDON: CPT

## 2017-09-10 PROCEDURE — 81001 URINALYSIS AUTO W/SCOPE: CPT | Performed by: EMERGENCY MEDICINE

## 2017-09-10 PROCEDURE — 83605 ASSAY OF LACTIC ACID: CPT | Performed by: SURGERY

## 2017-09-10 PROCEDURE — 84443 ASSAY THYROID STIM HORMONE: CPT | Performed by: NURSE PRACTITIONER

## 2017-09-10 RX ORDER — DIPHENHYDRAMINE HYDROCHLORIDE 50 MG/ML
25 INJECTION INTRAMUSCULAR; INTRAVENOUS ONCE
Status: COMPLETED | OUTPATIENT
Start: 2017-09-10 | End: 2017-09-10

## 2017-09-10 RX ORDER — PANTOPRAZOLE SODIUM 40 MG/1
40 INJECTION, POWDER, FOR SOLUTION INTRAVENOUS EVERY 12 HOURS SCHEDULED
Status: DISCONTINUED | OUTPATIENT
Start: 2017-09-10 | End: 2017-09-28

## 2017-09-10 RX ORDER — HYOSCYAMINE SULFATE 0.125 MG
0.12 TABLET,DISINTEGRATING ORAL EVERY 4 HOURS PRN
Status: DISCONTINUED | OUTPATIENT
Start: 2017-09-10 | End: 2017-09-10

## 2017-09-10 RX ORDER — MEPERIDINE HYDROCHLORIDE 50 MG/ML
50 INJECTION INTRAMUSCULAR; INTRAVENOUS; SUBCUTANEOUS ONCE
Status: COMPLETED | OUTPATIENT
Start: 2017-09-10 | End: 2017-09-10

## 2017-09-10 RX ORDER — METHYLPREDNISOLONE SODIUM SUCCINATE 40 MG/ML
20 INJECTION, POWDER, LYOPHILIZED, FOR SOLUTION INTRAMUSCULAR; INTRAVENOUS EVERY 12 HOURS SCHEDULED
Status: COMPLETED | OUTPATIENT
Start: 2017-09-10 | End: 2017-09-13

## 2017-09-10 RX ORDER — SODIUM CHLORIDE 9 MG/ML
80 INJECTION, SOLUTION INTRAVENOUS CONTINUOUS
Status: DISCONTINUED | OUTPATIENT
Start: 2017-09-10 | End: 2017-09-19

## 2017-09-10 RX ORDER — LORAZEPAM 2 MG/ML
1 INJECTION INTRAMUSCULAR ONCE
Status: COMPLETED | OUTPATIENT
Start: 2017-09-10 | End: 2017-09-10

## 2017-09-10 RX ORDER — ACETAMINOPHEN 325 MG/1
650 TABLET ORAL EVERY 6 HOURS PRN
Status: DISCONTINUED | OUTPATIENT
Start: 2017-09-10 | End: 2017-09-17

## 2017-09-10 RX ORDER — DIPHENHYDRAMINE HYDROCHLORIDE 50 MG/ML
25 INJECTION INTRAMUSCULAR; INTRAVENOUS
Status: DISCONTINUED | OUTPATIENT
Start: 2017-09-10 | End: 2017-09-10

## 2017-09-10 RX ORDER — ASPIRIN 325 MG
325 TABLET ORAL DAILY
Status: DISCONTINUED | OUTPATIENT
Start: 2017-09-10 | End: 2017-09-29 | Stop reason: HOSPADM

## 2017-09-10 RX ORDER — DIPHENHYDRAMINE HCL 25 MG
50 TABLET ORAL
Status: DISCONTINUED | OUTPATIENT
Start: 2017-09-10 | End: 2017-09-10

## 2017-09-10 RX ORDER — LORAZEPAM 2 MG/ML
0.5 INJECTION INTRAMUSCULAR EVERY 6 HOURS PRN
Status: COMPLETED | OUTPATIENT
Start: 2017-09-10 | End: 2017-09-11

## 2017-09-10 RX ORDER — ONDANSETRON 2 MG/ML
4 INJECTION INTRAMUSCULAR; INTRAVENOUS EVERY 6 HOURS PRN
Status: DISCONTINUED | OUTPATIENT
Start: 2017-09-10 | End: 2017-09-29 | Stop reason: HOSPADM

## 2017-09-10 RX ORDER — LEVOTHYROXINE SODIUM 0.07 MG/1
75 TABLET ORAL
Status: DISCONTINUED | OUTPATIENT
Start: 2017-09-10 | End: 2017-09-29 | Stop reason: HOSPADM

## 2017-09-10 RX ORDER — DIPHENHYDRAMINE HCL 25 MG
50 TABLET ORAL EVERY 4 HOURS PRN
Status: DISCONTINUED | OUTPATIENT
Start: 2017-09-10 | End: 2017-09-10

## 2017-09-10 RX ORDER — DIPHENHYDRAMINE HYDROCHLORIDE 50 MG/ML
25 INJECTION INTRAMUSCULAR; INTRAVENOUS EVERY 8 HOURS PRN
Status: DISCONTINUED | OUTPATIENT
Start: 2017-09-10 | End: 2017-09-11

## 2017-09-10 RX ORDER — LIOTHYRONINE SODIUM 5 UG/1
5 TABLET ORAL
Status: DISCONTINUED | OUTPATIENT
Start: 2017-09-10 | End: 2017-09-29 | Stop reason: HOSPADM

## 2017-09-10 RX ADMIN — PIPERACILLIN SODIUM AND TAZOBACTAM SODIUM 3.38 G: 36; 4.5 INJECTION, POWDER, FOR SOLUTION INTRAVENOUS at 05:09

## 2017-09-10 RX ADMIN — PIPERACILLIN SODIUM AND TAZOBACTAM SODIUM 3.38 G: 36; 4.5 INJECTION, POWDER, FOR SOLUTION INTRAVENOUS at 22:53

## 2017-09-10 RX ADMIN — ACETAMINOPHEN 650 MG: 325 TABLET, FILM COATED ORAL at 04:10

## 2017-09-10 RX ADMIN — LEVOTHYROXINE SODIUM 75 MCG: 75 TABLET ORAL at 05:09

## 2017-09-10 RX ADMIN — ONDANSETRON 4 MG: 2 INJECTION INTRAMUSCULAR; INTRAVENOUS at 18:03

## 2017-09-10 RX ADMIN — LIOTHYRONINE SODIUM 5 MCG: 5 TABLET ORAL at 06:00

## 2017-09-10 RX ADMIN — PANTOPRAZOLE SODIUM 40 MG: 40 INJECTION, POWDER, FOR SOLUTION INTRAVENOUS at 08:26

## 2017-09-10 RX ADMIN — ONDANSETRON 4 MG: 2 INJECTION INTRAMUSCULAR; INTRAVENOUS at 08:29

## 2017-09-10 RX ADMIN — HYDROMORPHONE HYDROCHLORIDE 0.5 MG: 1 INJECTION, SOLUTION INTRAMUSCULAR; INTRAVENOUS; SUBCUTANEOUS at 08:27

## 2017-09-10 RX ADMIN — SODIUM CHLORIDE 80 ML/HR: 0.9 INJECTION, SOLUTION INTRAVENOUS at 04:07

## 2017-09-10 RX ADMIN — ENOXAPARIN SODIUM 40 MG: 40 INJECTION SUBCUTANEOUS at 08:26

## 2017-09-10 RX ADMIN — LORAZEPAM 0.5 MG: 2 INJECTION INTRAMUSCULAR; INTRAVENOUS at 14:17

## 2017-09-10 RX ADMIN — LORAZEPAM 0.5 MG: 2 INJECTION INTRAMUSCULAR; INTRAVENOUS at 21:41

## 2017-09-10 RX ADMIN — DIPHENHYDRAMINE HYDROCHLORIDE 25 MG: 50 INJECTION, SOLUTION INTRAMUSCULAR; INTRAVENOUS at 14:17

## 2017-09-10 RX ADMIN — HYDROMORPHONE HYDROCHLORIDE 0.5 MG: 1 INJECTION, SOLUTION INTRAMUSCULAR; INTRAVENOUS; SUBCUTANEOUS at 12:46

## 2017-09-10 RX ADMIN — METHYLPREDNISOLONE SODIUM SUCCINATE 20 MG: 40 INJECTION, POWDER, FOR SOLUTION INTRAMUSCULAR; INTRAVENOUS at 08:27

## 2017-09-10 RX ADMIN — PIPERACILLIN SODIUM AND TAZOBACTAM SODIUM 3.38 G: 36; 4.5 INJECTION, POWDER, FOR SOLUTION INTRAVENOUS at 17:00

## 2017-09-10 RX ADMIN — HYDROMORPHONE HYDROCHLORIDE 0.5 MG: 1 INJECTION, SOLUTION INTRAMUSCULAR; INTRAVENOUS; SUBCUTANEOUS at 17:00

## 2017-09-10 RX ADMIN — DIPHENHYDRAMINE HYDROCHLORIDE 25 MG: 50 INJECTION, SOLUTION INTRAMUSCULAR; INTRAVENOUS at 00:26

## 2017-09-10 RX ADMIN — HYDROMORPHONE HYDROCHLORIDE 0.5 MG: 1 INJECTION, SOLUTION INTRAMUSCULAR; INTRAVENOUS; SUBCUTANEOUS at 04:10

## 2017-09-10 RX ADMIN — ASPIRIN 325 MG: 325 TABLET ORAL at 08:27

## 2017-09-10 RX ADMIN — MEPERIDINE HYDROCHLORIDE 50 MG: 50 INJECTION, SOLUTION INTRAMUSCULAR; INTRAVENOUS; SUBCUTANEOUS at 22:53

## 2017-09-10 RX ADMIN — DIPHENHYDRAMINE HYDROCHLORIDE 25 MG: 50 INJECTION, SOLUTION INTRAMUSCULAR; INTRAVENOUS at 21:53

## 2017-09-10 RX ADMIN — METHYLPREDNISOLONE SODIUM SUCCINATE 20 MG: 40 INJECTION, POWDER, FOR SOLUTION INTRAMUSCULAR; INTRAVENOUS at 20:18

## 2017-09-10 RX ADMIN — HYDROMORPHONE HYDROCHLORIDE 0.5 MG: 1 INJECTION, SOLUTION INTRAMUSCULAR; INTRAVENOUS; SUBCUTANEOUS at 20:18

## 2017-09-10 RX ADMIN — PIPERACILLIN SODIUM AND TAZOBACTAM SODIUM 3.38 G: 36; 4.5 INJECTION, POWDER, FOR SOLUTION INTRAVENOUS at 11:41

## 2017-09-10 RX ADMIN — PANTOPRAZOLE SODIUM 40 MG: 40 INJECTION, POWDER, FOR SOLUTION INTRAVENOUS at 20:18

## 2017-09-10 RX ADMIN — HYOSCYAMINE SULFATE 0.12 MG: 0.12 TABLET ORAL at 20:07

## 2017-09-10 RX ADMIN — ACETAMINOPHEN 650 MG: 325 TABLET, FILM COATED ORAL at 16:08

## 2017-09-10 RX ADMIN — LORAZEPAM 1 MG: 2 INJECTION, SOLUTION INTRAMUSCULAR; INTRAVENOUS at 00:26

## 2017-09-10 NOTE — PLAN OF CARE
DISCHARGE PLANNING     Discharge to home or other facility with appropriate resources Progressing        INFECTION - ADULT     Absence or prevention of progression during hospitalization Progressing     Absence of fever/infection during neutropenic period Progressing        Knowledge Deficit     Patient/family/caregiver demonstrates understanding of disease process, treatment plan, medications, and discharge instructions Progressing        Nutrition/Hydration-ADULT     Nutrient/Hydration intake appropriate for improving, restoring or maintaining nutritional needs Progressing        PAIN - ADULT     Verbalizes/displays adequate comfort level or baseline comfort level Progressing        Potential for Falls     Patient will remain free of falls Progressing        SAFETY ADULT     Maintain or return to baseline ADL function Progressing     Maintain or return mobility status to optimal level Progressing

## 2017-09-10 NOTE — ASSESSMENT & PLAN NOTE
Right arm  Evidence of superficial thrombophlebitis noted in a short segment of the cephalic vein right forearm   No evidence of acute or chronic deep vein thrombosis as per her vascular ultrasound of the arm  Add kpad and monitor serial exams

## 2017-09-10 NOTE — CONSULTS
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 on 8/24 with fever, right upper quadrant pain, diarrhea and hematochezia  CT showed stable intra and extrahepatic biliary dilatation status post cholecystectomy along with possible enterocolitis  She was treated with IV fluids and antibiotics and ultimately discharged home on 08/30  Since discharge, she reports persistent right upper quadrant pain and diarrhea  The pain is sharp and burning does not radiate  She tells me that her pain is similar to her prior pancreatitis attack  She has persistent postprandial diarrhea  The diarrhea starts within minutes of eating  She often sees undigested food in her stool  For the past 2 days she reports bright red blood coating her stool and in the toilet water  She admits to weight loss of nearly 20 pounds since January 2017  In the ED yesterday, she had fever of 100 3 and slight leukocytosis with WBC 11 63  LFTs and lipase completely normal  Hemoglobin normal  Repeat CT abdomen pelvis once again shows intra and extrahepatic biliary dilatation as well as CBD dilatation of 1 3 cm  The CT does note pneumobilia, which was not seen on prior scan  No evidence of colitis, although the scan was limited due to lack of PO contrast     Her last ERCP with sphincterotomy was Fall 2016 at [facility]   Her last colonoscopy was Fall 2016 at [facility] for GI bleeding  Colonoscopy showed colitis, per the patient  REVIEW OF SYSTEMS:    CONSTITUTIONAL: +FEVER  +WEIGHT LOSS  HEENT: No earache or tinnitus  Denies hearing loss or visual disturbances  CARDIOVASCULAR: No chest pain or palpitations  RESPIRATORY: Denies any cough, hemoptysis, shortness of breath or dyspnea on exertion  GASTROINTESTINAL: As noted in the History of Present Illness  GENITOURINARY: No problems with urination  Denies any hematuria or dysuria  NEUROLOGIC: No dizziness or vertigo, denies headaches  MUSCULOSKELETAL: Denies any muscle or joint pain  SKIN: Denies skin rashes or itching  ENDOCRINE: Denies excessive thirst  Denies intolerance to heat or cold  PSYCHOSOCIAL: Denies depression or anxiety  Denies any recent memory loss         Historical Information   Past Medical History:   Diagnosis Date    Anxiety     Asthma     Chronic kidney disease     Deep vein thrombosis     Disease of thyroid gland     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    Generalized anxiety disorder 8/26/2017    GERD (gastroesophageal reflux disease)     Heart murmur     Mast cell activation     Migraine     Pancreatitis     sphinger of     RA (rheumatoid arthritis)      Past Surgical History:   Procedure Laterality Date    APPENDECTOMY      CHOLECYSTECTOMY      HYSTERECTOMY      KNEE SURGERY Right     LAPAROSCOPIC ENDOMETRIOSIS FULGURATION      ORIF ANKLE FRACTURE Left     AK KNEE SCOPE,MED/LAT MENISECTOMY Left 5/12/2017    Procedure: POSSIBLE MEDIAL MENISECTOMY;  Surgeon: Gordo Gagnon MD;  Location: [facility];  Service: Orthopedics    AK KNEE SCOPE,SHAVE ARTICULAR CART Left 5/12/2017    Procedure: KNEE ARTHROSCOPY EVALUATION, CHONDROPLASTY;  Surgeon: Gordo Gagnon MD;  Location: [facility];  Service: Orthopedics     Social History   History   Alcohol Use No     History   Drug Use No     History   Smoking Status    Never Smoker   Smokeless Tobacco    Never Used     History reviewed  No pertinent family history  Meds/Allergies     Prescriptions Prior to Admission   Medication    diphenhydrAMINE (BENADRYL) 25 mg capsule    levothyroxine 75 mcg tablet    predniSONE 50 mg tablet    aspirin 325 mg tablet    diphenhydrAMINE (BENADRYL) 50 mg capsule    liothyronine (CYTOMEL) 5 mcg tablet    LORazepam (ATIVAN) 1 mg tablet    predniSONE 20 mg tablet     Current Facility-Administered Medications   Medication Dose Route Frequency    acetaminophen (TYLENOL) tablet 650 mg  650 mg Oral Q6H PRN    aspirin tablet 325 mg  325 mg Oral Daily    diphenhydrAMINE (BENADRYL) injection 25 mg  25 mg Intravenous HS PRN    enoxaparin (LOVENOX) subcutaneous injection 40 mg  40 mg Subcutaneous Daily    HYDROmorphone (DILAUDID) 1 mg/mL injection 0 5 mg  0 5 mg Intravenous Q4H PRN    levothyroxine tablet 75 mcg  75 mcg Oral Early Morning    liothyronine (CYTOMEL) tablet 5 mcg  5 mcg Oral Early Morning    LORazepam (ATIVAN) 2 mg/mL injection 0 5 mg  0 5 mg Intravenous Q6H PRN    methylPREDNISolone sodium succinate (Solu-MEDROL) injection 20 mg  20 mg Intravenous Q12H [facility]    ondansetron (ZOFRAN) injection 4 mg  4 mg Intravenous Q6H PRN    pantoprazole (PROTONIX) injection 40 mg  40 mg Intravenous Q12H [facility]    piperacillin-tazobactam (ZOSYN) 3 375 g in sodium chloride 0 9 % 50 mL IVPB  3 375 g Intravenous Q6H    sodium chloride 0 9 % infusion  80 mL/hr Intravenous Continuous       Allergies   Allergen Reactions    Aspergillus Fumigatus Other (See Comments), Hives and Swelling    Imitrex [Sumatriptan] Anaphylaxis     Bradycardia, sob, back pressure, head pain,throat tightness    Corn Dextrin     Imuran [Azathioprine] Other (See Comments)     pancreatitis    Sulfa Antibiotics Hives           Objective     Blood pressure 120/80, pulse 80, temperature 97 9 °F (36 6 °C), temperature source Oral, resp   rate 18, height 5' 5" (1 651 m), weight 63 5 kg (140 lb), SpO2 100 %  Intake/Output Summary (Last 24 hours) at 09/10/17 1128  Last data filed at 09/10/17 0839   Gross per 24 hour   Intake                0 ml   Output              700 ml   Net             -700 ml         PHYSICAL EXAM:      General Appearance:   Alert, cooperative, no distress, appears stated age    HEENT:   Normocephalic, atraumatic, anicteric      Neck:  Supple, symmetrical, trachea midline, no adenopathy    Lungs:   Clear to auscultation bilaterally; no rales, rhonchi or wheezing; respirations unlabored    Heart[de-identified]   S1 and S2 normal; regular rate and rhythm; no murmur, rub, or gallop  Abdomen:   Non-distended  Normal bowel sounds  Soft  Moderate tenderness to deep palpation of right upper quadrant  No rebound or guarding     Genitalia:   Deferred    Rectal:   Deferred    Extremities:  No cyanosis, clubbing or edema    Pulses:  2+ and symmetric all extremities    Skin:  Skin color, texture, turgor normal, no rashes or lesions    Lymph nodes:  No palpable cervical lymphadenopathy        Lab Results:     Results from last 7 days  Lab Units 09/10/17  0531 09/09/17  1910   WBC Thousand/uL 17 57* 11 63*   HEMOGLOBIN g/dL 12 8 14 1   HEMATOCRIT % 38 8 42 4   PLATELETS Thousands/uL 389 435*   LYMPHO PCT %  --  3*   MONO PCT MAN %  --  3*   EOSINO PCT MANUAL %  --  0       Results from last 7 days  Lab Units 09/10/17  0531   SODIUM mmol/L 140   POTASSIUM mmol/L 3 6   CHLORIDE mmol/L 107   CO2 mmol/L 27   BUN mg/dL 13   CREATININE mg/dL 0 71   CALCIUM mg/dL 8 0*   TOTAL PROTEIN g/dL 6 0*   BILIRUBIN TOTAL mg/dL 0 76   ALK PHOS U/L 45*   ALT U/L 26   AST U/L 10   GLUCOSE RANDOM mg/dL 82           Results from last 7 days  Lab Units 09/09/17  1910   LIPASE u/L 176       Imaging Studies: I have personally reviewed pertinent imaging studies  No results found      ASSESSMENT and PLAN:      66-year-old female with history of cholecystectomy, 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 status post multiple ERCP with stent placement, left sided ulcerative colitis, and mast cell activation disorder recently treated for colitis now readmitted with fever, right upper quadrant pain, and bloody diarrhea  1) RUQ pain with fever and leukocytosis: Unclear etiology, patient does not appear to have cholangitis as LFTs are completely normal  She does have new pneumobilia on CT scan however this could be related to prior sphincterotomy  Lipase is normal, making pancreatitis less likely     -Urgent ERCP is not indicated as patient does not appear to have biliary obstruction or cholangitis  -Continue supportive care with IV fluids and pain control  -Follow-up blood cultures  -Continue IV Zosyn for now    2) Hematochezia in the setting of recent colitis:  Patient reports frequent postprandial diarrhea with bright red blood for the past 2 days  She was recently treated for infectious colitis  Her diarrhea and bleeding could be explained by underlying ulcerative colitis  Cannot rule out C diff colitis although this typically does not cause bloody diarrhea  Her bleeding could also be related to hemorrhoids  -Agree with checking C diff toxin especially given fever and leukocytosis  -Continue supportive care with IVF  -Could consider colonoscopy if C  Diff toxin negative    3) Ulcerative colitis:  Last colonoscopy was Fall 2016 which apparently showed colitis related to UC  During recent admission, CRP was elevated at 15 2  She has been on high-dose steroids since discharge, so this should control any active inflammation     -Continue IV steroids for now    Patient was seen and examined by Dr Ruma Higuera  All [facility] medical decisions were made by Dr Ruma Higuera  Thank you for allowing us to participate in the care of this present patient  We will follow-up with you closely

## 2017-09-10 NOTE — ASSESSMENT & PLAN NOTE
GI and surgery following  Stool cultures pending including a C diff  Continue with Solu-Medrol 20 mg q 12 hours

## 2017-09-10 NOTE — H&P
History and Physical - [facility]    Patient Information: Rodrigue Beasley 38 y o  female MRN: [id number]  [facility]: [id number]  Admitting Physician: Benji Carter MD  PCP: Sathya Jiménez PA-C  Date of Admission:  09/10/17    Assessment/Plan:    Hospital Problem List:     Principal Problem:    Abdominal pain  Active Problems:    Colitis    Generalized anxiety disorder    Acquired hypothyroidism    Abnormal CT of the abdomen    Leukocytosis    Arm swelling      Plan for the Primary Problem(s):    · RUQ pain with abnormal CT of the abdomen findings , leukocytosis and low-grade fever   · patient will be seen by GI and surgical services  · NPO , IV fluids  · PPI IV b i d  · Antiemetic, Tylenol  · Continue broad-spectrum antibiotic  · Stool C diff  · While  patient is npo home po medications converted to IV form including prednisone taper, Benadryl and Ativan( last tw0 particularly  requested by patient)    Plan for Additional Problems:   · INDERJIT  Ativan IV  · Hypothyroidism continue Synthroid  · Arm swelling most likely secondary to superficial thrombophlebitis: warm compress , Tylenol, follow-up vascular studies, keep extremity elevated    VTE Prophylaxis: Enoxaparin (Lovenox)  / sequential compression device   Code Status: full  POLST: There is no POLST form on file for this patient (pre-hospital)    Anticipated Length of Stay:  Patient will be admitted on an Inpatient basis with an anticipated length of stay of  > 2 midnights  Justification for Hospital Stay:  GI and surgical evaluation    Total Time for Visit, including Counseling / Coordination of Care: 45 minutes  Greater than 50% of this total time spent on direct patient counseling and coordination of care      Chief Complaint:   RUQ pain, chills    History of Present Illness:    Rodrigue Beasley is a 38 y o  female with history of cholecystectomy ,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 , UC or IBS who originally presented to [facility]  ER on 9/9 with getting worse RUQ pain, described as sharp nonradiating aggravating with food and associated with nausea and fever  Patient still has stool covered in blood  As per patient she had that right upper quadrant pain since last admission at the end of August   Patient also complains on right forearm erythema and swelling since discharge   Patient states that  septic shock and colitis were  major concern during the previous admission and RUQ has not been addressed  Of notes she was in [facility] division on 8/24-8/30 with fever / bloody bowel movement / abdominal pain deemed to be secondary to colitis shown on imaging studies, subsequently she developed hypotension required aggressive fluid resuscitation , IV antibiotic and ICU trial course   Patient was evaluated by surgeon , GI , pulmonary  Cardiology consulted for NSTEMI due to fluid overload secondary to aggressive resuscitation and treatment of septic shock  Patient was discharged on steroid taper and Augmentin  Further workup in the emergency room revealed leukocytes [id number], LA 1 6  Low-grade fever noted  CT of abdomen and pelvis official report pending preliminary patient found to have pneumobilia and hepatic ducts dilatation  Case was discussed with GI on-call , Dr Matt Sands ,who recommended to transfer the patient to 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    Generalized anxiety disorder 8/26/2017    GERD (gastroesophageal reflux disease)     Heart murmur     Mast cell activation     Migraine     Pancreatitis     sphinger of     RA (rheumatoid arthritis)        Past Surgical History:   Procedure Laterality Date    APPENDECTOMY      CHOLECYSTECTOMY      HYSTERECTOMY      KNEE SURGERY Right     LAPAROSCOPIC ENDOMETRIOSIS FULGURATION      ORIF ANKLE FRACTURE Left     OR KNEE SCOPE,MED/LAT MENISECTOMY Left 5/12/2017    Procedure: POSSIBLE MEDIAL MENISECTOMY;  Surgeon: Annel Uribe MD;  Location: [facility] MAIN [facility];  Service: Orthopedics    OR KNEE [address] CART Left 5/12/2017    Procedure: KNEE ARTHROSCOPY EVALUATION, CHONDROPLASTY;  Surgeon: Annel Uribe MD;  Location: [facility];  Service: Orthopedics       Meds/Allergies:    Prior to Admission medications    Medication Sig Start Date End Date Taking? Authorizing Provider   aspirin 325 mg tablet Take 325 mg by mouth daily Took twice on thursday    Historical Provider, MD   diphenhydrAMINE (BENADRYL) 25 mg capsule Take 50 mg by mouth every 4 (four) hours as needed for itching 2 caps      Historical Provider, MD   diphenhydrAMINE (BENADRYL) 50 mg capsule Take 50 mg by mouth daily at bedtime    Historical Provider, MD   levothyroxine 75 mcg tablet Take 1 tablet by mouth daily 8/30/17   Elyn Goodpasture, MD   liothyronine (CYTOMEL) 5 mcg tablet Take 5 mcg by mouth daily    Historical Provider, MD   LORazepam (ATIVAN) 1 mg tablet Take 1 mg by mouth as needed      Historical Provider, MD   predniSONE 20 mg tablet Take 3 tablets daily, decrease by 10mg (1/2 tablet) every 7 days 8/30/17   Elyn Goodpasture, MD   predniSONE 50 mg tablet Take 50 mg by mouth daily For 7 days  9/9/17   Historical Provider, MD     I have reviewed home medications with a medical source (PCP, Pharmacy, other)  Allergies:    Allergies   Allergen Reactions    Aspergillus Fumigatus Other (See Comments), Hives and Swelling    Imitrex [Sumatriptan] Anaphylaxis     Bradycardia, sob, back pressure, head pain,throat tightness    Corn Dextrin     Imuran [Azathioprine] Other (See Comments)     pancreatitis    Sulfa Antibiotics Hives       Social History:     Marital Status: /Civil Union   Occupation:  Noncontributory  Patient Pre-hospital Living Situation: home  Patient Pre-hospital Level of Mobility: reg  Patient Pre-hospital Diet Restrictions: reg  Substance Use History:   History   Alcohol Use No     History   Smoking Status    Never Smoker   Smokeless Tobacco    Never Used     History   Drug Use No       Family History:    non-contributory    Physical Exam:     Vitals:   Blood Pressure: 127/76 (09/10/17 0328)  Pulse: 67 (09/10/17 0328)  Temperature: 99 °F (37 2 °C) (09/10/17 0328)  Temp Source: Oral (09/10/17 0328)  Respirations: 18 (09/10/17 0328)  Height: 5' 5" (165 1 cm) (09/10/17 0328)  Weight - Scale: 63 5 kg (140 lb) (09/10/17 0328)  SpO2: 100 % (09/10/17 0328)    Physical Exam   Constitutional: She is oriented to person, place, and time  She appears well-developed  HENT:   Head: Normocephalic  Neck: Normal range of motion  Cardiovascular: Regular rhythm  Pulmonary/Chest: No respiratory distress  Abdominal: She exhibits no distension  There is tenderness  There is no rebound and no guarding  Musculoskeletal: She exhibits no edema  Neurological: She is alert and oriented to person, place, and time  Skin: Skin is warm  There is erythema  Psychiatric: She has a normal mood and affect  Additional Data:     Lab Results: I have personally reviewed pertinent reports          Results from last 7 days  Lab Units 09/09/17 1910   WBC Thousand/uL 11 63*   HEMOGLOBIN g/dL 14 1   HEMATOCRIT % 42 4   PLATELETS Thousands/uL 435*   LYMPHO PCT % 3*   MONO PCT MAN % 3*   EOSINO PCT MANUAL % 0       Results from last 7 days  Lab Units 09/09/17 1910   SODIUM mmol/L 140   POTASSIUM mmol/L 4 2   CHLORIDE mmol/L 104   CO2 mmol/L 27   BUN mg/dL 21   CREATININE mg/dL 0 81 CALCIUM mg/dL 8 7   TOTAL PROTEIN g/dL 7 0   BILIRUBIN TOTAL mg/dL 0 20   ALK PHOS U/L 67   ALT U/L 37   AST U/L 13   GLUCOSE RANDOM mg/dL 111           Imaging: I have personally reviewed pertinent reports  Xr Chest Portable    Result Date: 8/25/2017  Narrative: CHEST INDICATION:  Shortness of breath  COMPARISON:  8/24/2017 VIEWS:   AP frontal IMAGES:  1 FINDINGS:     Cardiomediastinal silhouette appears unremarkable  The lungs are clear  No pneumothorax or pleural effusion  Visualized osseous structures appear within normal limits for the patient's age  Impression: No active pulmonary disease  Workstation performed: [id number]     X-ray Chest 1 View Portable    Result Date: 8/25/2017  Narrative: CHEST INDICATION:  42-year-old woman with fever, chills and loose stool  COMPARISON:  December 30, 2014  VIEWS:   AP frontal IMAGES:  1 FINDINGS:     Cardiomediastinal silhouette appears unremarkable  The lungs are clear  No pneumothorax or pleural effusion  Visualized osseous structures appear within normal limits for the patient's age  Impression: Normal examination  Workstation performed: [id number]     Xr Chest Pa & Lateral    Result Date: 8/29/2017  Narrative: CHEST INDICATION:  Shortness of breath  Weakness  Pleural effusion  COMPARISON:  Chest radiographs August 25, 2017 and CT pulmonary angiogram August 27, 2017  VIEWS:  Frontal and lateral projections IMAGES:  2 FINDINGS:     Cardiomediastinal silhouette appears unremarkable  The lungs are well aerated  There has been improvement in bilateral pleural fusions, with near complete resolution  Bibasilar infiltrates, most compatible with atelectasis  Upper lobes clear  Visualized osseous structures appear within normal limits for the patient's age  Impression: Near complete resolution of bilateral pleural effusions   Workstation performed: [id number]     Ct Head Wo Contrast    Result Date: 8/25/2017  Narrative: CT BRAIN - WITHOUT CONTRAST INDICATION:  Headache, nausea COMPARISON:  MRI brain, 9/24/2008 TECHNIQUE:  CT examination of the brain was performed  In addition to axial images, coronal reformatted images were created and submitted for interpretation  Radiation dose length product (DLP) for this visit:  936 11 mGy-cm   This examination, like all CT scans performed in the [facility], was performed utilizing techniques to minimize radiation dose exposure, including the use of iterative  reconstruction and automated exposure control  IMAGE QUALITY:  Diagnostic  FINDINGS:  PARENCHYMA:  Patient was administered intravenous contrast for CT abdomen and pelvis performed earlier today  Typical post contrast administration appearance of the brain noted  This limits sensitivity in detection of acute intracranial hemorrhage although no suspicious findings are demonstrated  There is no evidence of mass effect or midline shift  Gray-white matter differentiation appears appropriate  VENTRICLES AND EXTRA-AXIAL SPACES:  Normal for patient's age  VISUALIZED ORBITS AND PARANASAL SINUSES:  Unremarkable  CALVARIUM AND EXTRACRANIAL SOFT TISSUES:   Normal      Impression: No acute intracranial abnormality  Workstation performed: [id number]     Us Abdomen Limited    Addendum Date: 9/9/2017 Addendum:   Corrected report; there was inadveratent signing of the previous report  RIGHT UPPER QUADRANT ULTRASOUND INDICATION:  Right upper quadrant pain COMPARISON:  None  TECHNIQUE:   Real-time ultrasound of the right upper quadrant was performed with a curvilinear transducer with both volumetric sweeps and still imaging techniques  FINDINGS: PANCREAS:  Visualized portions of the pancreas are within normal limits  AORTA AND IVC:  Visualized portions are normal for patient age  LIVER: Size:  Within normal range  The liver measures 17 8 cm in the midclavicular line  Contour:  Surface contour is smooth   Parenchyma:  Echogenicity and echotexture are within normal limits  No evidence of suspicious mass  Limited imaging of the main portal vein shows it to be patent and hepatopetal   BILIARY: The gallbladder is surgically absent No intrahepatic biliary dilatation  CBD measures 1 3 cm No choledocholithiasis  KIDNEY: Right kidney measures 10 6 x 4 1 cm  Within normal limits  ASCITES:   None  IMPRESSION: Dilated common bile duct which measures about 1 3 cm, this was also noted on the previous study of August 24, 2017  If concern for biliary obstruction and there is concern for choledocholithiasis MRCP can be performed as needed Gallbladder is surgically absent  ##cfslh I personally discussed this result with Sherrie Blum on 9/9/2017 10:18 PM  ##      Result Date: 9/9/2017  Narrative: RIGHT UPPER QUADRANT ULTRASOUND INDICATION:  Right upper quadrant pain COMPARISON:  None  TECHNIQUE:   Real-time ultrasound of the right upper quadrant was performed with a curvilinear transducer with both volumetric sweeps and still imaging techniques  FINDINGS: PANCREAS:  Visualized portions of the pancreas are within normal limits  AORTA AND IVC:  Visualized portions are normal for patient age  LIVER: Size:  Within normal range  The liver measures 17 8 cm in the midclavicular line  Contour:  Surface contour is smooth  Parenchyma:  Echogenicity and echotexture are within normal limits  No evidence of suspicious mass  Limited imaging of the main portal vein shows it to be patent and hepatopetal   BILIARY: The gallbladder is normal in caliber  No wall thickening or pericholecystic fluid  No stones or sludge identified  No sonographic Mejia's sign  No intrahepatic biliary dilatation  CBD measures 1 3 cm No choledocholithiasis  KIDNEY: Right kidney measures 10 6 x 4 1 cm  Within normal limits  ASCITES:   None  Impression: Dilated common bile duct which measures about 1 3 cm, this was also noted on the previous study of August 24, 2017   If concern for biliary obstruction and there is concern for choledocholithiasis MRCP can be performed as needed ##imslh##imslh Workstation performed: [id number]     Cta Chest Pe Study    Result Date: 8/27/2017  Narrative: CTA - CHEST WITH IV CONTRAST - PULMONARY ANGIOGRAM INDICATION: Hypoxia  Sepsis  COMPARISON: 10/4/2007 TECHNIQUE: CTA examination of the chest was performed using angiographic technique according to a protocol specifically tailored to evaluate for pulmonary embolism  Reformatted images were created in axial, sagittal, and coronal planes  In addition, coronal 3D MIP postprocessing was performed on the acquisition scanner  Radiation dose length product (DLP) for this visit:  254 mGy-cm   This examination, like all CT scans performed in the [facility], was performed utilizing techniques to minimize radiation dose exposure, including the use of iterative reconstruction and automated exposure control  IV Contrast:  85 mL of iohexol (OMNIPAQUE)      FINDINGS: PULMONARY ARTERIAL TREE:  No pulmonary embolus is seen  LUNGS:  Hypoaeration with bilateral compressive atelectasis  Scattered bilateral multilobar groundglass opacities identified  Pulmonary nodule in the lateral aspect of the left upper lobe subpleural lung, coronal image 53, 7 mm  PLEURA:  Moderate-sized right pleural effusion, small left effusion  No pneumothorax HEART/AORTA:  No pericardial effusion  Borderline ectasia of ascending thoracic aorta measuring 3 8 cm  MEDIASTINUM AND CHRIS:  Unremarkable  CHEST WALL AND LOWER NECK:   Diffuse body wall edema  No axillary adenopathy  VISUALIZED STRUCTURES IN THE UPPER ABDOMEN:  Distention of the inferior vena cava and contrast pooling within the hepatic veins  This finding can be seen in the setting of elevated right atrial pressure  OSSEOUS STRUCTURES:  No acute fracture or destructive osseous lesion  Impression: 1  No evidence of pulmonary embolism  2  Moderate size right and small left-sided pleural effusions  These findings are felt to have developed since the previous chest x-ray  3  Hypoventilatory changes in the lungs, with scattered groundglass opacities  This could represent edema or developing pneumonia  Bibasilar compressive atelectasis 4  7 mm left upper lobe pulmonary nodule  Based on current Fleischner Society 2017 Guidelines on incidental pulmonary nodule, followup non-contrast CT is recommended, initially at 6-12 months from this examination and, if stable at that time, an additional followup is recommended for 18-24 months from this exam  5  Pooling of contrast within the hepatic veins, and distended IVC, these findings are nonspecific although potentially related to elevated right atrial pressure 6  Diffuse body wall edema       Workstation performed: [id number]     Ct Abdomen Pelvis W Contrast    Result Date: 8/24/2017  Narrative: CT ABDOMEN AND PELVIS WITH IV CONTRAST INDICATION:  Severe headache for 3 days  Fever  Nausea  COMPARISON: 12/30/2014  TECHNIQUE:  CT examination of the abdomen and pelvis was performed  Reformatted images were created in axial, sagittal, and coronal planes  Radiation dose length product (DLP) for this visit:  229 28 mGy-cm   This examination, like all CT scans performed in the [facility], was performed utilizing techniques to minimize radiation dose exposure, including the use of iterative  reconstruction and automated exposure control  IV Contrast:  100 mL of iohexol (OMNIPAQUE)     Enteric Contrast:  Enteric contrast was not administered  FINDINGS: ABDOMEN LOWER CHEST:  Atelectatic change within the lung bases, left greater than right  LIVER/BILIARY TREE:  Mild intrahepatic biliary dilatation  There is moderate extrahepatic biliary dilatation  GALLBLADDER:  Gallbladder is surgically absent  SPLEEN:  Unremarkable  PANCREAS:  Slight pancreatic ductal dilatation  No parenchymal mass  ADRENAL GLANDS:  Unremarkable   KIDNEYS/URETERS:  No hydronephrosis  No nephrolithiasis  STOMACH AND BOWEL:  No bowel dilatation to suggest obstruction  Portions of small bowel demonstrate suggestion of mild wall thickening possibly related to incomplete distention  Mild fecal stasis within the right colon  GI tract is limited without oral contrast   There may be mild wall thickening within the sigmoid colon and rectum  However, this could be related to incomplete distention  Mild colitis not excluded  APPENDIX:  Previous appendectomy  ABDOMINOPELVIC CAVITY:  No ascites or free intraperitoneal air  No lymphadenopathy  VESSELS:  Unremarkable for patient's age  PELVIS REPRODUCTIVE ORGANS:  Previous hysterectomy  URINARY BLADDER:  Incompletely distended  ABDOMINAL WALL/INGUINAL REGIONS:  Unremarkable  OSSEOUS STRUCTURES:  No acute fracture or destructive osseous lesion  Impression: Limited evaluation of the GI tract secondary to lack of oral contrast administration  There may be mild thickening of the distal large bowel including sigmoid colon and rectum  Ssimilar findings within portions of the small bowel  Enterocolitis not excluded  This may be related to incomplete distention  Intrahepatic and extrahepatic biliary dilatation status post cholecystectomy, unchanged from prior examination  Workstation performed: [id number]       EKG, Pathology, and Other Studies Reviewed on Admission:   · EKG: sinus    Allscripts / Epic Records Reviewed: Yes     ** Please Note: This note has been constructed using a voice recognition system   **

## 2017-09-10 NOTE — CONSULTS
Consultation - General Surgery   Madie Muniz 38 y o  female MRN: [id number]  Unit/Bed#: [facility] 823-01 Encounter: [id number]    Assessment/Plan     Assessment:  37 yo F with abdominal pain, history of UC and sphincter of oddi dysfunction s/p cholecystectomy and multiple ERCP with recent admission for L sided colitis treated with antibiotics and steroid taper     - currently with RUQ pain (this is similar to her recent pain for which she was treated for L sided colitis)   - CT scan with evidence of pneumobilia  -Lipase 176      Plan:  NPO  IVF  Serial abdominal exams  Antibiotics   Steroid taper per SLIM  GI consult pending,   Will send stool cultures in addition to C diff  Check am lactic       History of Present Illness     HPI:  Madie Muniz is a 38 y o  female who presents with abdominal pain [place] a [place]  Patient with extensive medical history including several year history of rheumatological conditions RA, UC on steroids, sphincter of oddi dysfunction s/p multiple ERCP presenting with RUQ abodminal pain, loose stools and found to have pneumobilia on CT scan  Patient recently admitted and treated for L sided colitis with antibiotics, hypotensive then started on high dose steroid taper  Last ERCP was in late November by her GI doctor at [facility]  Sphincterotomy performed at that time  Previous CT scan in august demonsrtated dilation of biliary ducts but not pneumobilia  Now with pneumobilia  patietn also complaining of diffuse body aches and lowgrade fever/chills  Consults    Review of Systems   Constitutional: Negative for chills and fever  HENT: Negative for congestion  Respiratory: Negative for cough and shortness of breath  Cardiovascular: Negative for chest pain  Gastrointestinal: Positive for abdominal pain  Negative for constipation and diarrhea  Musculoskeletal: Negative for arthralgias  Neurological: Negative for headaches  Psychiatric/Behavioral: Negative for agitation  Historical Information   Past Medical History:   Diagnosis Date    Anxiety     Asthma     Chronic kidney disease     Deep vein thrombosis     Disease of thyroid gland     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    Generalized anxiety disorder 8/26/2017    GERD (gastroesophageal reflux disease)     Heart murmur     Mast cell activation     Migraine     Pancreatitis     sphinger of     RA (rheumatoid arthritis)      Past Surgical History:   Procedure Laterality Date    APPENDECTOMY      CHOLECYSTECTOMY      HYSTERECTOMY      KNEE SURGERY Right     LAPAROSCOPIC ENDOMETRIOSIS FULGURATION      ORIF ANKLE FRACTURE Left     FL KNEE SCOPE,MED/LAT MENISECTOMY Left 5/12/2017    Procedure: POSSIBLE MEDIAL MENISECTOMY;  Surgeon: Jennie Rivas MD;  Location: [facility];  Service: Orthopedics    FL KNEE SCOPE,SHAVE ARTICULAR CART Left 5/12/2017    Procedure: KNEE ARTHROSCOPY EVALUATION, CHONDROPLASTY;  Surgeon: Jennie Rivas MD;  Location: [facility];  Service: Orthopedics     Social History   History   Alcohol Use No     History   Drug Use No     History   Smoking Status    Never Smoker   Smokeless Tobacco    Never Used     Family History: non-contributory    Meds/Allergies   all current active meds have been reviewed  Allergies   Allergen Reactions    Aspergillus Fumigatus Other (See Comments), Hives and Swelling    Imitrex [Sumatriptan] Anaphylaxis     Bradycardia, sob, back pressure, head pain,throat tightness    Corn Dextrin     Imuran [Azathioprine] Other (See Comments)     pancreatitis    Sulfa Antibiotics Hives       Objective   First Vitals:   Blood Pressure: 127/76 (09/10/17 0328)  Pulse: 67 (09/10/17 0328)  Temperature: 99 °F (37 2 °C) (09/10/17 0328)  Temp Source: Oral (09/10/17 0328)  Respirations: 18 (09/10/17 0328)  Height: 5' 5" (165 1 cm) (09/10/17 0328)  Weight - Scale: 63 5 kg (140 lb) (09/10/17 0328)  SpO2: 100 % (09/10/17 0328)    Current Vitals:   Blood Pressure: 127/76 (09/10/17 0328)  Pulse: 67 (09/10/17 0328)  Temperature: 99 °F (37 2 °C) (09/10/17 0328)  Temp Source: Oral (09/10/17 0328)  Respirations: 18 (09/10/17 0328)  Height: 5' 5" (165 1 cm) (09/10/17 0328)  Weight - Scale: 63 5 kg (140 lb) (09/10/17 0328)  SpO2: 100 % (09/10/17 0328)    No intake or output data in the 24 hours ending 09/10/17 0434    Invasive Devices     Peripheral Intravenous Line            Peripheral IV 09/09/17 Left Antecubital less than 1 day                Physical Exam   Constitutional: She appears well-developed and well-nourished  HENT:   Head: Normocephalic  Eyes: Pupils are equal, round, and reactive to light  Neck: Normal range of motion  Neck supple  Cardiovascular: Normal rate  Pulmonary/Chest: Effort normal    Abdominal: Soft  Bowel sounds are normal    Tender RUQ to deep palpation  No peritonitis  Musculoskeletal: Normal range of motion  Neurological: She is alert  Lab Results:   I have personally reviewed pertinent lab results  , CBC:   Lab Results   Component Value Date    WBC 11 63 (H) 09/09/2017    HGB 14 1 09/09/2017    HCT 42 4 09/09/2017    MCV 96 09/09/2017     (H) 09/09/2017    MCH 32 0 09/09/2017    MCHC 33 3 09/09/2017    RDW 13 2 09/09/2017    MPV 9 2 09/09/2017   , CMP:   Lab Results   Component Value Date     09/09/2017    K 4 2 09/09/2017     09/09/2017    CO2 27 09/09/2017    ANIONGAP 9 09/09/2017    BUN 21 09/09/2017    CREATININE 0 81 09/09/2017    GLUCOSE 111 09/09/2017    CALCIUM 8 7 09/09/2017    AST 13 09/09/2017    ALT 37 09/09/2017    ALKPHOS 67 09/09/2017    PROT 7 0 09/09/2017    ALBUMIN 3 4 (L) 09/09/2017    BILITOT 0 20 09/09/2017    EGFR 89 09/09/2017     Imaging: I have personally reviewed pertinent reports  EKG, Pathology, and Other Studies: I have personally reviewed pertinent reports  Counseling / Coordination of Care  Total floor / unit time spent today 30 minutes    Greater than 50% of total time was spent with the patient and / or family counseling and / or coordination of care  A description of the counseling / coordination of care: 30

## 2017-09-10 NOTE — PROGRESS NOTES
Progress Note - Madie Muniz 38 y o  female MRN: [id number]    Unit/Bed#: [facility] 823-01 Encounter: [id number]        Thrombophlebitis   Assessment & Plan    Right arm  Evidence of superficial thrombophlebitis noted in a short segment of the cephalic vein right forearm  No evidence of acute or chronic deep vein thrombosis as per her vascular ultrasound of the arm  Add kpad and monitor serial exams        Arm swelling   Assessment & Plan    Right arm  Evidence of superficial thrombophlebitis noted in a short segment of the cephalic vein right forearm  No evidence of acute or chronic deep vein thrombosis as per her vascular ultrasound of the arm  Add kpad and monitor serial exams        Leukocytosis   Assessment & Plan    Wbcs 17  Check am cbc  C/w zosyn        Acquired hypothyroidism   Assessment & Plan    Continue with Synthroid  Check a m  TSH        Generalized anxiety disorder   Assessment & Plan    Supportive care        Colitis   Assessment & Plan    GI and surgery following  Stool cultures pending including a C diff  Continue with Solu-Medrol 20 mg q 12 hours        * Abdominal pain   Assessment & Plan    Right upper quadrant abdominal pain with fever and leukocytosis  Surgery and GI following  Urgent ERCP is not indicated at this time no suspicion for biliary extraction or cholangitis at this time  Continue with IV fluids and pain control  Continue with Zosyn                VTE Pharmacologic Prophylaxis:   Pharmacologic: Enoxaparin (Lovenox)  Mechanical VTE Prophylaxis in Place: Yes    Patient Centered Rounds: I have performed bedside rounds with nursing staff today  Discussions with Specialists or Other Care Team Provider: GI    Education and Discussions with Family / Patient: d/w patient     Time Spent for Care: 30 minutes  More than 50% of total time spent on counseling and coordination of care as described above      Current Length of Stay: 0 day(s)    Current Patient Status: Inpatient   Certification Statement: The patient will continue to require additional inpatient hospital stay due to iv abxs    Discharge Plan: not stable for discharge     Code Status: Level 1 - Full Code      Subjective:   Pt reports pain is improved to a 3/10in her right upper quadrant  Still nauseated at times but no vomiting  No BM since admission  3/10 headache which has improved as well  No other complaints  Objective:     Vitals:   Temp (24hrs), Av 7 °F (37 1 °C), Min:97 9 °F (36 6 °C), Max:100 3 °F (37 9 °C)    HR:  [64-98] 80  Resp:  [16-20] 18  BP: (104-127)/(61-83) 120/80  SpO2:  [97 %-100 %] 100 %  Body mass index is 23 3 kg/m²  Input and Output Summary (last 24 hours): Intake/Output Summary (Last 24 hours) at 09/10/17 1429  Last data filed at 09/10/17 1230   Gross per 24 hour   Intake                0 ml   Output              700 ml   Net             -700 ml       Physical Exam:      Physical Exam   Constitutional: She is oriented to person, place, and time  She appears well-developed  No distress  Neck: Neck supple  Cardiovascular: Normal rate, regular rhythm, normal heart sounds and intact distal pulses  No murmur heard  Pulmonary/Chest: Effort normal and breath sounds normal  No respiratory distress  She has no wheezes  She has no rales  Abdominal: Bowel sounds are normal  There is tenderness (right upper quadrant)  Neurological: She is alert and oriented to person, place, and time  No cranial nerve deficit  Skin: Skin is warm and dry  No rash noted  She is not diaphoretic  No erythema  Psychiatric: She has a normal mood and affect         Additional Data:     Labs:      Results from last 7 days  Lab Units 09/10/17  0531 17  1910   WBC Thousand/uL 17 57* 11 63*   HEMOGLOBIN g/dL 12 8 14 1   HEMATOCRIT % 38 8 42 4   PLATELETS Thousands/uL 389 435*   LYMPHO PCT %  --  3*   MONO PCT MAN %  --  3*   EOSINO PCT MANUAL %  --  0       Results from last 7 days  Lab Units 09/10/17  0531   SODIUM mmol/L 140   POTASSIUM mmol/L 3 6   CHLORIDE mmol/L 107   CO2 mmol/L 27   BUN mg/dL 13   CREATININE mg/dL 0 71   CALCIUM mg/dL 8 0*   TOTAL PROTEIN g/dL 6 0*   BILIRUBIN TOTAL mg/dL 0 76   ALK PHOS U/L 45*   ALT U/L 26   AST U/L 10   GLUCOSE RANDOM mg/dL 82           * I Have Reviewed All Lab Data Listed Above  * Additional Pertinent Lab Tests Reviewed: All Labs Within Last 24 Hours Reviewed    Imaging:    Imaging Reports Reviewed Today Include: chest xray, ct abd and pelvis, venous duplex    Recent Cultures (last 7 days):           Last 24 Hours Medication List:     aspirin 325 mg Oral Daily   enoxaparin 40 mg Subcutaneous Daily   levothyroxine 75 mcg Oral Early Morning   liothyronine 5 mcg Oral Early Morning   methylPREDNISolone sodium succinate 20 mg Intravenous Q12H [facility]   pantoprazole 40 mg Intravenous Q12H [facility]   piperacillin-tazobactam 3 375 g Intravenous Q6H        Today, Patient Was Seen By: JONATHAN Huerta    ** Please Note: Dragon 360 Dictation voice to text software may have been used in the creation of this document   **

## 2017-09-11 PROBLEM — R10.11 RUQ PAIN: Status: ACTIVE | Noted: 2017-09-10

## 2017-09-11 LAB
ANION GAP SERPL CALCULATED.3IONS-SCNC: 8 MMOL/L (ref 4–13)
ATRIAL RATE: 70 BPM
BACTERIA UR QL AUTO: ABNORMAL /HPF
BILIRUB UR QL STRIP: NEGATIVE
BUN SERPL-MCNC: 13 MG/DL (ref 5–25)
CALCIUM SERPL-MCNC: 8.6 MG/DL (ref 8.3–10.1)
CHLORIDE SERPL-SCNC: 104 MMOL/L (ref 100–108)
CLARITY UR: CLEAR
CO2 SERPL-SCNC: 26 MMOL/L (ref 21–32)
COLOR UR: YELLOW
CREAT SERPL-MCNC: 0.7 MG/DL (ref 0.6–1.3)
ERYTHROCYTE [DISTWIDTH] IN BLOOD BY AUTOMATED COUNT: 13.3 % (ref 11.6–15.1)
GFR SERPL CREATININE-BSD FRML MDRD: 106 ML/MIN/1.73SQ M
GLUCOSE SERPL-MCNC: 80 MG/DL (ref 65–140)
GLUCOSE UR STRIP-MCNC: NEGATIVE MG/DL
HCT VFR BLD AUTO: 42.3 % (ref 34.8–46.1)
HGB BLD-MCNC: 14.1 G/DL (ref 11.5–15.4)
HGB UR QL STRIP.AUTO: NEGATIVE
KETONES UR STRIP-MCNC: ABNORMAL MG/DL
LEUKOCYTE ESTERASE UR QL STRIP: NEGATIVE
MCH RBC QN AUTO: 32.1 PG (ref 26.8–34.3)
MCHC RBC AUTO-ENTMCNC: 33.3 G/DL (ref 31.4–37.4)
MCV RBC AUTO: 96 FL (ref 82–98)
NITRITE UR QL STRIP: NEGATIVE
NON-SQ EPI CELLS URNS QL MICRO: ABNORMAL /HPF
P AXIS: 35 DEGREES
PH UR STRIP.AUTO: 6 [PH] (ref 4.5–8)
PLATELET # BLD AUTO: 426 THOUSANDS/UL (ref 149–390)
PMV BLD AUTO: 9.3 FL (ref 8.9–12.7)
POTASSIUM SERPL-SCNC: 4.1 MMOL/L (ref 3.5–5.3)
PR INTERVAL: 112 MS
PROT UR STRIP-MCNC: NEGATIVE MG/DL
QRS AXIS: 59 DEGREES
QRSD INTERVAL: 76 MS
QT INTERVAL: 412 MS
QTC INTERVAL: 444 MS
RBC # BLD AUTO: 4.39 MILLION/UL (ref 3.81–5.12)
RBC #/AREA URNS AUTO: ABNORMAL /HPF
SODIUM SERPL-SCNC: 138 MMOL/L (ref 136–145)
SP GR UR STRIP.AUTO: 1.02 (ref 1–1.03)
T WAVE AXIS: 57 DEGREES
UROBILINOGEN UR QL STRIP.AUTO: 0.2 E.U./DL
VENTRICULAR RATE: 70 BPM
WBC # BLD AUTO: 10.52 THOUSAND/UL (ref 4.31–10.16)
WBC #/AREA URNS AUTO: ABNORMAL /HPF

## 2017-09-11 PROCEDURE — C9113 INJ PANTOPRAZOLE SODIUM, VIA: HCPCS | Performed by: HOSPITALIST

## 2017-09-11 PROCEDURE — 80048 BASIC METABOLIC PNL TOTAL CA: CPT | Performed by: NURSE PRACTITIONER

## 2017-09-11 PROCEDURE — 87493 C DIFF AMPLIFIED PROBE: CPT | Performed by: HOSPITALIST

## 2017-09-11 PROCEDURE — 87046 STOOL CULTR AEROBIC BACT EA: CPT | Performed by: SURGERY

## 2017-09-11 PROCEDURE — 87899 AGENT NOS ASSAY W/OPTIC: CPT | Performed by: SURGERY

## 2017-09-11 PROCEDURE — 87040 BLOOD CULTURE FOR BACTERIA: CPT | Performed by: INTERNAL MEDICINE

## 2017-09-11 PROCEDURE — 87045 FECES CULTURE AEROBIC BACT: CPT | Performed by: SURGERY

## 2017-09-11 PROCEDURE — 81001 URINALYSIS AUTO W/SCOPE: CPT | Performed by: INTERNAL MEDICINE

## 2017-09-11 PROCEDURE — 85027 COMPLETE CBC AUTOMATED: CPT | Performed by: NURSE PRACTITIONER

## 2017-09-11 PROCEDURE — 87015 SPECIMEN INFECT AGNT CONCNTJ: CPT | Performed by: SURGERY

## 2017-09-11 RX ORDER — MEPERIDINE HYDROCHLORIDE 50 MG/ML
50 INJECTION INTRAMUSCULAR; INTRAVENOUS; SUBCUTANEOUS EVERY 4 HOURS PRN
Status: DISCONTINUED | OUTPATIENT
Start: 2017-09-11 | End: 2017-09-17

## 2017-09-11 RX ORDER — MEPERIDINE HYDROCHLORIDE 50 MG/ML
50 INJECTION INTRAMUSCULAR; INTRAVENOUS; SUBCUTANEOUS EVERY 4 HOURS PRN
Status: COMPLETED | OUTPATIENT
Start: 2017-09-11 | End: 2017-09-11

## 2017-09-11 RX ORDER — TRAMADOL HYDROCHLORIDE 50 MG/1
50 TABLET ORAL EVERY 6 HOURS PRN
Status: DISCONTINUED | OUTPATIENT
Start: 2017-09-11 | End: 2017-09-29 | Stop reason: HOSPADM

## 2017-09-11 RX ORDER — LORAZEPAM 1 MG/1
1 TABLET ORAL EVERY 6 HOURS PRN
Status: COMPLETED | OUTPATIENT
Start: 2017-09-11 | End: 2017-09-12

## 2017-09-11 RX ORDER — DIPHENHYDRAMINE HYDROCHLORIDE 50 MG/ML
25 INJECTION INTRAMUSCULAR; INTRAVENOUS EVERY 8 HOURS PRN
Status: DISCONTINUED | OUTPATIENT
Start: 2017-09-11 | End: 2017-09-18

## 2017-09-11 RX ORDER — OXYCODONE HYDROCHLORIDE 10 MG/1
10 TABLET ORAL EVERY 4 HOURS PRN
Status: DISCONTINUED | OUTPATIENT
Start: 2017-09-11 | End: 2017-09-11

## 2017-09-11 RX ADMIN — HYOSCYAMINE SULFATE 0.12 MG: 0.12 TABLET ORAL at 06:20

## 2017-09-11 RX ADMIN — ONDANSETRON 4 MG: 2 INJECTION INTRAMUSCULAR; INTRAVENOUS at 05:00

## 2017-09-11 RX ADMIN — METHYLPREDNISOLONE SODIUM SUCCINATE 20 MG: 40 INJECTION, POWDER, FOR SOLUTION INTRAMUSCULAR; INTRAVENOUS at 09:10

## 2017-09-11 RX ADMIN — MEPERIDINE HYDROCHLORIDE 50 MG: 50 INJECTION, SOLUTION INTRAMUSCULAR; INTRAVENOUS; SUBCUTANEOUS at 05:08

## 2017-09-11 RX ADMIN — MEPERIDINE HYDROCHLORIDE 50 MG: 50 INJECTION, SOLUTION INTRAMUSCULAR; INTRAVENOUS; SUBCUTANEOUS at 21:43

## 2017-09-11 RX ADMIN — PIPERACILLIN SODIUM AND TAZOBACTAM SODIUM 3.38 G: 36; 4.5 INJECTION, POWDER, FOR SOLUTION INTRAVENOUS at 05:07

## 2017-09-11 RX ADMIN — ONDANSETRON 4 MG: 2 INJECTION INTRAMUSCULAR; INTRAVENOUS at 13:03

## 2017-09-11 RX ADMIN — LORAZEPAM 1 MG: 1 TABLET ORAL at 21:43

## 2017-09-11 RX ADMIN — PIPERACILLIN SODIUM AND TAZOBACTAM SODIUM 3.38 G: 36; 4.5 INJECTION, POWDER, FOR SOLUTION INTRAVENOUS at 11:13

## 2017-09-11 RX ADMIN — ENOXAPARIN SODIUM 40 MG: 40 INJECTION SUBCUTANEOUS at 09:10

## 2017-09-11 RX ADMIN — LEVOTHYROXINE SODIUM 75 MCG: 75 TABLET ORAL at 05:07

## 2017-09-11 RX ADMIN — HYDROMORPHONE HYDROCHLORIDE 0.5 MG: 1 INJECTION, SOLUTION INTRAMUSCULAR; INTRAVENOUS; SUBCUTANEOUS at 13:11

## 2017-09-11 RX ADMIN — LORAZEPAM 0.5 MG: 2 INJECTION INTRAMUSCULAR; INTRAVENOUS at 06:20

## 2017-09-11 RX ADMIN — PANTOPRAZOLE SODIUM 40 MG: 40 INJECTION, POWDER, FOR SOLUTION INTRAVENOUS at 21:45

## 2017-09-11 RX ADMIN — DIPHENHYDRAMINE HYDROCHLORIDE 25 MG: 50 INJECTION, SOLUTION INTRAMUSCULAR; INTRAVENOUS at 18:37

## 2017-09-11 RX ADMIN — SODIUM CHLORIDE 80 ML/HR: 0.9 INJECTION, SOLUTION INTRAVENOUS at 23:20

## 2017-09-11 RX ADMIN — POLYETHYLENE GLYCOL 3350, SODIUM SULFATE ANHYDROUS, SODIUM BICARBONATE, SODIUM CHLORIDE, POTASSIUM CHLORIDE 4000 ML: 236; 22.74; 6.74; 5.86; 2.97 POWDER, FOR SOLUTION ORAL at 17:30

## 2017-09-11 RX ADMIN — MEPERIDINE HYDROCHLORIDE 50 MG: 50 INJECTION, SOLUTION INTRAMUSCULAR; INTRAVENOUS; SUBCUTANEOUS at 17:19

## 2017-09-11 RX ADMIN — HYDROMORPHONE HYDROCHLORIDE 0.5 MG: 1 INJECTION, SOLUTION INTRAMUSCULAR; INTRAVENOUS; SUBCUTANEOUS at 02:08

## 2017-09-11 RX ADMIN — HYOSCYAMINE SULFATE 0.12 MG: 0.12 TABLET ORAL at 02:08

## 2017-09-11 RX ADMIN — SODIUM CHLORIDE 80 ML/HR: 0.9 INJECTION, SOLUTION INTRAVENOUS at 11:17

## 2017-09-11 RX ADMIN — LIOTHYRONINE SODIUM 5 MCG: 5 TABLET ORAL at 05:13

## 2017-09-11 RX ADMIN — PIPERACILLIN SODIUM AND TAZOBACTAM SODIUM 3.38 G: 36; 4.5 INJECTION, POWDER, FOR SOLUTION INTRAVENOUS at 17:19

## 2017-09-11 RX ADMIN — MEPERIDINE HYDROCHLORIDE 50 MG: 50 INJECTION, SOLUTION INTRAMUSCULAR; INTRAVENOUS; SUBCUTANEOUS at 09:10

## 2017-09-11 RX ADMIN — DIPHENHYDRAMINE HYDROCHLORIDE 25 MG: 50 INJECTION, SOLUTION INTRAMUSCULAR; INTRAVENOUS at 07:47

## 2017-09-11 RX ADMIN — METHYLPREDNISOLONE SODIUM SUCCINATE 20 MG: 40 INJECTION, POWDER, FOR SOLUTION INTRAMUSCULAR; INTRAVENOUS at 21:44

## 2017-09-11 RX ADMIN — TRAMADOL HYDROCHLORIDE 50 MG: 50 TABLET, COATED ORAL at 19:46

## 2017-09-11 RX ADMIN — PANTOPRAZOLE SODIUM 40 MG: 40 INJECTION, POWDER, FOR SOLUTION INTRAVENOUS at 09:10

## 2017-09-11 RX ADMIN — PIPERACILLIN SODIUM AND TAZOBACTAM SODIUM 3.38 G: 36; 4.5 INJECTION, POWDER, FOR SOLUTION INTRAVENOUS at 23:18

## 2017-09-11 RX ADMIN — ONDANSETRON 4 MG: 2 INJECTION INTRAMUSCULAR; INTRAVENOUS at 19:46

## 2017-09-11 NOTE — PROGRESS NOTES
Progress Note - Mir Mcfarland 38 y o  female MRN: [id number]    Unit/Bed#: [facility] 823-01 Encounter: [id number]         Assessment/ Plan:  RUQ pain  Hematochezia  IBD    1  RUQ pain - possible hx of sphincter of ODDI dysfunction  S/p ERCP x 2 w/ sphincterotomy  Dilated intra & extrahepatic ductal dilation, stable  With normal LFT's it makes this less likely  Will hold on repeat ERCP at this time  Possibly related PUD will arrange for EGD tomorrow  Possibly related to below  -NPO after midnight  -EGD tomorrow    2  IBD - pt states crohn's but chart says UC  Not currently on any meds, except prednisone daily  Last scope in 2016 but according to pt only flex sig & poor prep  CT showing sigmoid & rectal colitis  +episode of rectal bleeding prior to admission  No further episodes  Hbg stable  No areas of inflammation on CT to explain RUQ pain will would recommend colonoscopy to evaluate disease  -Golytely prep  -Colonoscopy tomorrow      Subjective:   Pt still complaining of RUQ pain  She is refusing to eat b/c of sxs  No further BM's  Objective:     Vitals: Blood pressure 101/60, pulse 66, temperature 98 5 °F (36 9 °C), temperature source Oral, resp  rate 18, height 5' 5" (1 651 m), weight 63 5 kg (140 lb), SpO2 95 %  ,Body mass index is 23 3 kg/m²  Physical Exam: General appearance: alert, cooperative and no distress  Lungs: clear to auscultation bilaterally  Heart: regular rate and rhythm  Abdomen: +BS, soft, TTP RUQ  Skin: Skin color, texture, turgor normal  No rashes or lesions     Invasive Devices     Peripheral Intravenous Line            Peripheral IV 09/09/17 Left Antecubital 1 day                Lab, Imaging and other studies: I have personally reviewed pertinent reports       CBC:   Lab Results   Component Value Date    WBC 10 52 (H) 09/11/2017    HGB 14 1 09/11/2017    HCT 42 3 09/11/2017    MCV 96 09/11/2017     (H) 09/11/2017    MCH 32 1 09/11/2017    MCHC 33 3 09/11/2017    RDW 13 3 09/11/2017    MPV 9 3 09/11/2017   ,   CMP:   Lab Results   Component Value Date     09/11/2017    K 4 1 09/11/2017     09/11/2017    CO2 26 09/11/2017    ANIONGAP 8 09/11/2017    BUN 13 09/11/2017    CREATININE 0 70 09/11/2017    GLUCOSE 80 09/11/2017    CALCIUM 8 6 09/11/2017    EGFR 106 09/11/2017   ,

## 2017-09-11 NOTE — CONSULTS
Consultation - Inpatient Pain Management   Nazario Casey 38 y o  female MRN: [id number]  Unit/Bed#: [facility] 823-01 Encounter: [id number]               Assessment/Plan     Assessment:     Principal Problem:    Abdominal pain  Active Problems:    Colitis    Generalized anxiety disorder    Acquired hypothyroidism    Abnormal CT of the abdomen    Leukocytosis    Thrombophlebitis      Plan/Recommendations:   Acute RUQ pain  · Steroids as per primary team  · D/C Oxycodone  · D/C Dilaudid PRN  · Tramadol 50mg PO Q6 hours PRN for severe pain   · Demerol 50mg IV Q4 hours PRN breakthrough pain    Reviewed with SLIM    History of Present Illness    Admit Date:  9/10/2017  Hospital Day:  1 day  Primary Service:  General Medicine  Attending Provider:  Evelyne Arteaga MD  Physician Requesting Consult: Evelyne Arteaga MD  Reason for Consult / Principal Problem: acute abdominal pain  HPI: Nazario Casey is a 38y o  year old female who presents with increased RUQ abdominal pain (worse with eating), nausea and fever  She was recently at [facility] for fever RUQ pain, diarrhea and hematochezia  CT showed intra and extrahepatic biliary dilation sp tai along with possible enterocolitis  She was treated with IVF and antibiotics and sent home  Since D/C pain in the RUQ has been ongoing with bloody diarrhea  Patient was admitted secondary to fever, leukocytosis, and abnormal CT scan of abdomen  Patient is NPO receiving IV steroids and antibiotics  Review of Systems:  + headache  + neck pain  + RUQ abdominal pain  + nausea  + bloody bowel movements   Denied LE pain and weakness    Pain History:  Pain History: Patient reports following with a holistic provider as an outpatient for pain control and wellness  Denies ongoing use of opioid medications for pain  Current pain location(s): abdominal   Pain Scale:   5-10  Severity:  severe  Quality: pressure, cramping  Treatment History:  Patient resting in bed, appears comfortable   Reporting good pain control with IV Demerol vs Dilaudid  Patient ok with Tramadol; has taken in the past without ash side effects  Current Analgesic regimen:  Solu-Medrol, Tylenol PRN, Benadryl PRN, Dilaudid IV (2 mg last 24 hours);  Demerol IV (50mg x 3 doses in last 24 hours)    Historical Information   Past Medical History:   Diagnosis Date    Anxiety     Asthma     Chronic kidney disease     Deep vein thrombosis     Disease of thyroid gland     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    Generalized anxiety disorder 8/26/2017    GERD (gastroesophageal reflux disease)     Heart murmur     Mast cell activation     Migraine     Pancreatitis     sphinger of     RA (rheumatoid arthritis)      Past Surgical History:   Procedure Laterality Date    APPENDECTOMY      CHOLECYSTECTOMY      HYSTERECTOMY      KNEE SURGERY Right     LAPAROSCOPIC ENDOMETRIOSIS FULGURATION      ORIF ANKLE FRACTURE Left     [place] KNEE SCOPE,MED/LAT MENISECTOMY Left 5/12/2017    Procedure: POSSIBLE MEDIAL MENISECTOMY;  Surgeon: Alex Espana MD;  Location: [facility];  Service: Orthopedics    AZ KNEE SCOPE,SHAVE ARTICULAR CART Left 5/12/2017    Procedure: KNEE ARTHROSCOPY EVALUATION, CHONDROPLASTY;  Surgeon: Alex Espana MD;  Location: [facility];  Service: Orthopedics     Social History   History   Alcohol Use No     History   Drug Use No     History   Smoking Status    Never Smoker   Smokeless Tobacco    Never Used     Family History: non-contributory    Meds/Allergies   Prior to Admission Medications  Prescriptions Prior to Admission   Medication    diphenhydrAMINE (BENADRYL) 25 mg capsule    levothyroxine 75 mcg tablet    predniSONE 50 mg tablet    aspirin 325 mg tablet    diphenhydrAMINE (BENADRYL) 50 mg capsule    liothyronine (CYTOMEL) 5 mcg tablet    LORazepam (ATIVAN) 1 mg tablet    predniSONE 20 mg tablet     Hospital Medications  Current Facility-Administered Medications   Medication Dose Route Frequency  acetaminophen (TYLENOL) tablet 650 mg  650 mg Oral Q6H PRN    aspirin tablet 325 mg  325 mg Oral Daily    diphenhydrAMINE (BENADRYL) injection 25 mg  25 mg Intravenous Q8H PRN    enoxaparin (LOVENOX) subcutaneous injection 40 mg  40 mg Subcutaneous Daily    HYDROmorphone (DILAUDID) 1 mg/mL injection 0 25 mg  0 25 mg Intravenous Q6H PRN    hyoscyamine (LEVSIN/SL) SL tablet 0 125 mg  0 125 mg Sublingual Q4H PRN    levothyroxine tablet 75 mcg  75 mcg Oral Early Morning    liothyronine (CYTOMEL) tablet 5 mcg  5 mcg Oral Early Morning    methylPREDNISolone sodium succinate (Solu-MEDROL) injection 20 mg  20 mg Intravenous Q12H KARISSA    ondansetron (ZOFRAN) injection 4 mg  4 mg Intravenous Q6H PRN    oxyCODONE (ROXICODONE) immediate release tablet 10 mg  10 mg Oral Q4H PRN    oxyCODONE (ROXICODONE) IR tablet 15 mg  15 mg Oral Q4H PRN    pantoprazole (PROTONIX) injection 40 mg  40 mg Intravenous Q12H KARISSA    piperacillin-tazobactam (ZOSYN) 3 375 g in sodium chloride 0 9 % 50 mL IVPB  3 375 g Intravenous Q6H    sodium chloride 0 9 % infusion  80 mL/hr Intravenous Continuous       Allergies   Allergen Reactions    Aspergillus Fumigatus Other (See Comments), Hives and Swelling    Imitrex [Sumatriptan] Anaphylaxis     Bradycardia, sob, back pressure, head pain,throat tightness    Corn Dextrin     Imuran [Azathioprine] Other (See Comments)     pancreatitis    Sulfa Antibiotics Hives       Objective   Temp:  [98 2 °F (36 8 °C)-99 4 °F (37 4 °C)] 98 5 °F (36 9 °C)  HR:  [64-73] 66  Resp:  [17-18] 18  BP: ()/(57-71) 101/60    Intake/Output Summary (Last 24 hours) at 09/11/17 1625  Last data filed at 09/11/17 1400   Gross per 24 hour   Intake              100 ml   Output             2600 ml   Net            -2500 ml       Physical Exam:  General Appearance:    Alert, cooperative, no distress   Neurological:   Oriented to person, place, and time   Head:    Normocephalic, without obvious abnormality, atraumatic   Eyes:    EOM's intact   Lungs:     Clear to auscultation bilaterally, respirations unlabored   Chest Wall:    No tenderness or deformity   Abdomen:        Soft, round    Heart:    Regular rate and rhythm, S1 and S2 normal   Extremities:   Extremities no edema, intact sensation to light touch   Pulses:   2+ and symmetric all extremities   Skin:   Skin color pale, no rashes or lesions     Lab Results:   Results from last 7 days  Lab Units 09/11/17  0550   WBC Thousand/uL 10 52*   HEMOGLOBIN g/dL 14 1   HEMATOCRIT % 42 3   PLATELETS Thousands/uL 426*      Results from last 7 days  Lab Units 09/11/17  0550 09/10/17  0531   SODIUM mmol/L 138 140   POTASSIUM mmol/L 4 1 3 6   CHLORIDE mmol/L 104 107   CO2 mmol/L 26 27   BUN mg/dL 13 13   CREATININE mg/dL 0 70 0 71   CALCIUM mg/dL 8 6 8 0*   TOTAL PROTEIN g/dL  --  6 0*   BILIRUBIN TOTAL mg/dL  --  0 76   ALK PHOS U/L  --  45*   ALT U/L  --  26   AST U/L  --  10   GLUCOSE RANDOM mg/dL 80 82       Imaging Studies: I have personally reviewed pertinent reports  Counseling / Coordination of Care  Total floor / unit time spent today 1 hour  Greater than 50% of total time was spent with the patient and / or family counseling and / or coordination of care   A description of the counseling / coordination of care: Reviewed plan of care and medications with patient, RN staff, GI and primary care team

## 2017-09-11 NOTE — PROGRESS NOTES
Spoke with Jeimy Sena from [place] in regards to patient uncontrolled pain  Patient states dilaudid is not helping enough  Patient rated pain 7/10  One time dose of Dermerol 50mg ordered IV

## 2017-09-11 NOTE — CASE MANAGEMENT
[address] [facility] in the [facility] by Carlos Alberto Conner for 2017  Network Utilization Review Department  Phone: [phone]; Fax [phone]  ATTENTION: The Network Utilization Review Department is now centralized for our 7 Facilities  Make a note that we have a new phone and fax numbers for our Department  Please call with any questions or concerns to [phone] and carefully follow the prompts so that you are directed to the right person  All voicemails are confidential  Fax any determinations, approvals, denials, and requests for initial or continue stay review clinical to [phone]  Due to HIGH CALL volume, it would be easier if you could please send faxed requests to expedite your requests and in part, help us provide discharge notifications faster  Initial Clinical Review    Admission: Date/Time/Statement:    9/10/17 AT 0328 INPATIENT TRANSFER FROM [facility] TO [facility] 2ND RUQ ABDOMINAL PAIN + HEMATOCHEZIA    Orders Placed This Encounter   Procedures    Inpatient Admission     Standing Status:   Standing     Number of Occurrences:   1     Order Specific Question:   Admitting Physician     Answer:   Zuleima Villeda     Order Specific Question:   Level of Care     Answer:   Med Surg [16]     Order Specific Question:   Estimated length of stay     Answer:   More than 2 Midnights     Order Specific Question:   Certification     Answer:   I certify that inpatient services are medically necessary for this patient for a duration of greater than two midnights  See H&P and MD Progress Notes for additional information about the patient's course of treatment       Date/Time/Mode of Arrival:  9/10/17 AT 0328 INPATIENT TRANSFER FROM [facility] TO 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 , UC or IBS who originally presented to [facility]  ER on 9/9 with getting worse RUQ pain, described as sharp nonradiating aggravating with food and associated with nausea and fever  Patient still has stool covered in blood  As per patient she had that right upper quadrant pain since last admission at the end of August   Patient also complains on right forearm erythema and swelling since discharge   Patient states that  septic shock and colitis were  major concern during the previous admission and RUQ has not been addressed  Of notes she was in [facility] division on 8/24-8/30 with fever / bloody bowel movement / abdominal pain deemed to be secondary to colitis shown on imaging studies, subsequently she developed hypotension required aggressive fluid resuscitation , IV antibiotic and ICU trial course   Patient was evaluated by surgeon , GI , pulmonary  Cardiology consulted for NSTEMI due to fluid overload secondary to aggressive resuscitation and treatment of septic shock  Patient was discharged on steroid taper and Augmentin  Further workup in the emergency room revealed leukocytes [id number], LA 1 6  Low-grade fever noted  CT of abdomen and pelvis official report pending preliminary patient found to have pneumobilia and hepatic ducts dilatation  Case was discussed with GI on-call , Dr Rhiannon Camarillo ,who recommended to transfer the patient to [facility] for GI evaluation  He agreeable with current antibiotic  Patient remained hemodynamically stable          Review of Systems:  Gastrointestinal: Positive for abdominal pain, blood in stool and nausea     Musculoskeletal:      Right forearm swelling and erythema       ED Vital Signs:   ED Triage Vitals   Temperature Pulse Respirations Blood Pressure SpO2   09/10/17 0328 09/10/17 0328 09/10/17 0328 09/10/17 0328 09/10/17 0328   99 °F (37 2 °C) 67 18 127/76 100 %      Temp Source Heart Rate Source Patient Position BP Location FiO2 (%)   09/10/17 0328 09/10/17 0735 09/10/17 0328 09/10/17 0328 --   Oral Monitor Lying Right arm       Pain Score       09/10/17 0410       8        Wt Readings from Last 1 Encounters:   09/10/17 63 5 kg (140 lb)      09/10 0701  09/11 0700 09/11 0701  09/11 1238  Most Recent    Temperature (°F) ! 101 99 4  99 4 (37 4)    Pulse 71-80 64  64    Respirations 17-18 18  18    Blood Pressure 98//80 117/71  117/71    SpO2 (%)  96  96        Abnormal Labs/Diagnostic Test Results:   CBC  Results from last 7 days  Lab Units 09/10/17  0531 09/09/17  1910   WBC Thousand/uL 17 57* 11 63*   HEMOGLOBIN g/dL 12 8 14 1   HEMATOCRIT % 38 8 42 4   PLATELETS Thousands/uL 389 435*   LYMPHO PCT %  --  3*   MONO PCT MAN %  --  3*   EOSINO PCT MANUAL %  --  0      CMP  Results from last 7 days  Lab Units 09/10/17  0531   SODIUM mmol/L 140   POTASSIUM mmol/L 3 6   CHLORIDE mmol/L 107   CO2 mmol/L 27   BUN mg/dL 13   CREATININE mg/dL 0 71   CALCIUM mg/dL 8 0*   TOTAL PROTEIN g/dL 6 0*   BILIRUBIN TOTAL mg/dL 0 76   ALK PHOS U/L 45*   ALT U/L 26   AST U/L 10   GLUCOSE RANDOM mg/dL 82        Lab Units 09/09/17  1910   LIPASE u/L 176       RIGHT UPPER QUADRANT ABDOMINAL ULTRASOUND -  Dilated common bile duct which measures about 1 3 cm, this was also noted on the previous study of August 24, 2017  If concern for biliary obstruction and there is concern for choledocholithiasis MRCP can be performed as needed           Past Medical/Surgical History:    Active Ambulatory Problems     Diagnosis Date Noted    Colitis 08/25/2017    Severe sepsis 08/25/2017    Lactic acidosis 08/25/2017    Ulcerative colitis 08/25/2017    Gastrointestinal hemorrhage 08/25/2017    Vitamin D deficiency 08/25/2017    Hypokalemia 08/25/2017    Hypotension 08/25/2017    Throat tightness 08/25/2017    Septic shock 08/25/2017    Bradycardia 08/25/2017    Abdominal pain 08/25/2017    Headache 08/25/2017    Hypophosphatemia 08/26/2017    Hypomagnesemia 08/26/2017    Generalized anxiety disorder 08/26/2017    Acquired hypothyroidism 08/26/2017    Low TSH level 08/26/2017    Hypocalcemia 08/26/2017    Hyperglycemia 08/27/2017    Acute respiratory failure with hypoxia 08/27/2017    Bilateral pleural effusion 08/27/2017    Acute cardiogenic pulmonary edema 08/27/2017    Elevated troponin level 08/28/2017    Arm swelling 09/10/2017     Resolved Ambulatory Problems     Diagnosis Date Noted    Chondromalacia patellae, left knee 05/12/2017    Acute diastolic CHF (congestive heart failure) 08/27/2017     Past Medical History:   Diagnosis Date    Anxiety     Asthma     Chronic kidney disease     Deep vein thrombosis     Disease of thyroid gland     Disorder of sphincter of Oddi     Generalized anxiety disorder 8/26/2017    GERD (gastroesophageal reflux disease)     Heart murmur     Mast cell activation     Migraine     Pancreatitis     RA (rheumatoid arthritis)        Admitting Diagnosis: Abdominal pain [R10 9]    Age/Sex: 38 y o  female       Assessment/Plan:   Attestation signed by MD at 9/10/2017 9:48 PM        Ms Elisabet Vega is a 15-year-old female previously seen by Dr Vijay Mitchell and Dr Nanci Elkins 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 presents with right upper quadrant pain, IBS symptoms with postprandial diarrhea, history of UC, with recent admission to [address] [place] [address] for abdominal pain, diarrhea and hematochezia Was discharged on August 30 as she was treated with IV fluids/ antibiotics  She presented back to the ED at [address] [place] [address] so was transferred to [facility] for further management As patient is requesting a sphincterotomy for history of a SOD  Complicating this issue is she also has mass cell activation disorder and currently on steroids which have been up titrated  Lastly she also been getting Xolair and feels like every dose associated with worsening symptoms overall   Lastly she also has been spiking fevers even in setting of high-dose steroids      SOD type I?- She had seen Dr Nanci Elkins in 2015 where she underwent ERCP with sphincterotomy as she presented with CBD dilation (Postcholecystectomy to difficult to assess) and elevated LFTs  Initially she had PD and CBD stent placed and then eventually had a sphincterotomy  She was recommended to go to [place] for manometry to check for pressures at the level of sphincter of Oddi dysfunction but this was never performed  Patient states that the capability to perform this at her she was not available and sphincterotomy was only was performed there  Since then she has had multiple ERCPs Sphincterotomy last one was at the end of 2016  Review of those notes show that patient had a "generous sphincterotomy"  Currently states that her symptoms are secondary to sphincter of oddie dysfunction as she had similar pain Which improved significantly after sphincterotomy  Symptoms seemed to start at the end of August with persistent abdominal pain  LFT Have been normal during this whole time  She also has not specific IBS symptoms with postprandial diarrhea  Currently CBD dilated but stable from previous imaging  Pneumobilia is new but normal finding in patient that has previous ERCPs  CAT SCANS including the one that was recently done were reviewed with the radiologist today  As I was getting history patient had another episode of sharp right upper quadrant pain which limited to history  She was given a dose of hyoscyamine and dilated to see her symptoms improved  Lastly patient also spiked a fever today so would hold off on any further evaluation at this time  We'll decide if MRI will add anything additional      UC- 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 been off all her medications  She was on topical mesalamine with majority of inflammation component the left side  She was plan for colonoscopy in January 2017 but unclear if this was done at that time  Currently she is on high dose of steroids which should be controlling her UC as well  We'll check fecal calprotectin   We'll need To restart mesalamine after all the acute process resolves  High-dose steroids currently are not for UC but rather for mast cell activation disease     Hospital Problem List:    Principal Problem:    Abdominal pain    Active Problems:    Colitis    Generalized anxiety disorder    Acquired hypothyroidism    Abnormal CT of the abdomen    Leukocytosis    Arm swelling      Plan for the Primary Problem(s):     ? RUQ pain with abnormal CT of the abdomen findings , leukocytosis and low-grade fever   ? patient will be seen by GI and surgical services  ? NPO , IV fluids  ? PPI IV b i d   ? Antiemetic, Tylenol  ? Continue broad-spectrum antibiotic  ? Stool C diff  ? While  patient is npo home po medications converted to IV form including prednisone taper, Benadryl and Ativan( last tw0 particularly  requested by patient)     Plan for Additional Problems:   · INDERJIT  Ativan IV  · Hypothyroidism continue Synthroid  · Arm swelling most likely secondary to superficial thrombophlebitis: warm compress , Tylenol, follow-up vascular studies, keep extremity elevated     VTE Prophylaxis: Enoxaparin (Lovenox)  / sequential compression device   Code Status: full  POLST: There is no POLST form on file for this patient (pre-hospital)     Anticipated Length of Stay:  Patient will be admitted on an Inpatient basis with an anticipated length of stay of  > 2 midnights       Justification for Hospital Stay:  GI and surgical evaluation      Admission Orders:  9/10/17 AT 0328  ADMIT INPATIENT   VS Q4HRS    Up + OOB as Tolerated  SCD    NPO    Continuous IV Infusions:   sodium chloride 80 mL/hr Last Rate: 80 mL/hr (09/11/17 1117)     Scheduled Meds:   aspirin 325 mg Oral Daily   enoxaparin 40 mg Subcutaneous Daily   levothyroxine 75 mcg Oral Early Morning   liothyronine 5 mcg Oral Early Morning   methylPREDNISolone sodium succinate 20 mg Intravenous Q12H KARISSA   pantoprazole 40 mg Intravenous Q12H KARISSA   piperacillin-tazobactam 3 375 g Intravenous Q6H       PRN Meds:    Acetaminophen 650 mg q6hrs prn given x 2    diphenhydrAMINE 25 mg q8hrs prn given x 2    Hyoscyamine SL Tablet 0 125 mg q4hrs prn given x 3    IV Ondansetron 4 mg q6hrs prn given x 3    Consult GI    Consult Gen Surgery    REPEAT CBC IN AM       Gastroenterology  Consults Date of Service: 9/10/2017 11:28 AM       ASSESSMENT and PLAN:     53-year-old female with history of cholecystectomy, 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 status post multiple ERCP with stent placement, left sided ulcerative colitis, and mast cell activation disorder recently treated for colitis now readmitted with fever, right upper quadrant pain, and bloody diarrhea        1) RUQ pain with fever and leukocytosis: Unclear etiology, patient does not appear to have cholangitis as LFTs are completely normal  She does have new pneumobilia on CT scan however this could be related to prior sphincterotomy  Lipase is normal, making pancreatitis less likely      -Urgent ERCP is not indicated as patient does not appear to have biliary obstruction or cholangitis  -Continue supportive care with IV fluids and pain control  -Follow-up blood cultures  -Continue IV Zosyn for now     2) Hematochezia in the setting of recent colitis:  Patient reports frequent postprandial diarrhea with bright red blood for the past 2 days  She was recently treated for infectious colitis  Her diarrhea and bleeding could be explained by underlying ulcerative colitis  Cannot rule out C diff colitis although this typically does not cause bloody diarrhea  Her bleeding could also be related to hemorrhoids      -Agree with checking C diff toxin especially given fever and leukocytosis  -Continue supportive care with IVF  -Could consider colonoscopy if C  Diff toxin negative     3) Ulcerative colitis:  Last colonoscopy was Fall 2016 which apparently showed colitis related to UC  During recent admission, CRP was elevated at 15 2    She has been on high-dose steroids since discharge, so this should control any active inflammation      - Continue IV steroids for now

## 2017-09-11 NOTE — PROGRESS NOTES
Spoke with Kristina Caraballo from [facility] again regarding patient pain  Patient states her pain in 10/10  Dilaudid ordered d/c Demerol 50mg ordered q4 hr prn

## 2017-09-11 NOTE — PROGRESS NOTES
Progress Note - General Surgery   Lesley Lr 38 y o  female MRN: [id number]  Unit/Bed#: [facility] 823-01 Encounter: [id number]    Assessment:  39 yo F with abdominal pain, history of UC and sphincter of oddi dysfunction s/p cholecystectomy and multiple ERCP with recent admission for L sided colitis treated with antibiotics and steroid taper  Now presents with RUQ pain, CT scan with evidence of pneumobilia  Plan:  - no surgical intervention indicated  - GI on board, recs for fever workup before intervention  - abx  - monitor fevers  - serial abd exams  - f/u cx  - please call with any questions    Subjective/Objective   Subjective: c/o severe RUQ pain, nausea, intermittent fever/chills    Objective:    Blood pressure 100/57, pulse 73, temperature 99 1 °F (37 3 °C), temperature source Oral, resp  rate 18, height 5' 5" (1 651 m), weight 63 5 kg (140 lb), SpO2 99 %  ,Body mass index is 23 3 kg/m²  I/O last 24 hours:   In: 100 [IV Piggyback:100]  Out: 3000 [Urine:3000]    Invasive Devices     Peripheral Intravenous Line            Peripheral IV 09/09/17 Left Antecubital 1 day                Physical Exam:   NAD  RRR  Norm resp effort  Abd soft, tender RUQ, ND      Lab, Imaging and other studies:  Lab Results   Component Value Date    WBC 10 52 (H) 09/11/2017    HGB 14 1 09/11/2017    HCT 42 3 09/11/2017    MCV 96 09/11/2017     (H) 09/11/2017      Lab Results   Component Value Date    GLUCOSE 80 09/11/2017    CALCIUM 8 6 09/11/2017     09/11/2017    K 4 1 09/11/2017    CO2 26 09/11/2017     09/11/2017    BUN 13 09/11/2017    CREATININE 0 70 09/11/2017       VTE Pharmacologic Prophylaxis: Enoxaparin (Lovenox)  VTE Mechanical Prophylaxis: sequential compression device

## 2017-09-11 NOTE — PLAN OF CARE

## 2017-09-11 NOTE — PROGRESS NOTES
Spoke with Luster Keepers in regards to patient pain  Patient presented with 10/10 upon assessment  Addition 0 5mg dose of dilaudid ordered

## 2017-09-11 NOTE — PROGRESS NOTES
Patient is continuing to complain of severe 10/10 abdominal pain  She had been administered IV demerol at 0508 and IV ativan at 0620 and has also taken her PO hyoscyamine, which is all documented in the [facility]  I explained to the patient that she has received all of her PRN narcotics, including adjuvant medications and at this time she is not due for anything else  I offered to call [facility], but warned the patient that additional narcotics may not be ordered  The patient continues to complain about her severe pain  After reiterating that she has received all of her PRNs and that a page would be placed to [facility], she no longer wanted to speak with me and demanded I leave the room  Tanisha Sommer PA-C with [facility] was made aware and she came to see the patient  No additional medications have been ordered at this time  Will pass on this information to the oncoming nurse

## 2017-09-11 NOTE — PROGRESS NOTES
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 -hence reason for general pain medication  · - discussed with his other pain service, , recommendations to continue steroids at this time discontinue oxycodone, discontinue Dilaudid as needed order tramadol 50 mg p o  q 6 hours as needed for severe pain and Demerol 50 mg IV q 4 hours as needed for breakthrough pain  · - patient is status post ERCP x2 with sphincterotomy  · - also dilated intra and extrahepatic ductal dilatation remained stable  · - LFT is normal  · - GI will hold on ERCP at this time  · - feel could be secondary to possible related PUD plan for EGD tomorrow  · - patient will be NPO after midnight, tonight  · - with plan for EGD in the morning  · Right upper quadrant abdominal ultrasound:  Dilated common bile duct which measured about 1 3 cm previously noted on study in August 2017 if concern for biliary obstruction and there is concern for choledocholithiasis MRCP could be performed  · Continue IV fluids for now as patient is NPO  · - stool for C diff ordered  · Pt has been selective about which nurses take care of her and has some not return as her nurse based on whether they are bringing her pain medication on time     ·   · IBD:  · - patient states that she has Crohn's disease  · - documentation reports ulcerative colitis  · - patient is not on any current medications except steroids prednisone  · - patient reports flex sigmoid in 2016  · - CT abdomen and pelvis demonstrates nonobstructing bilateral renal calculi  Status post cholecystectomy  Intrahepatic and extrahepatic as well and common bile duct dilatation with pneumobilia  Findings most compatible with patient's history of prior sphincterotomy  · Seen by General surgery with no surgical intervention recommendation  · - continue antibiotics monitor fever and serial abdominal exams  ·   · Fevers  · - patient with fever on admission 101 heart rate notably 71, otherwise remained stable  · - blood cultures x2 show no growth at 24 hrs hours  · No stool sample sent as of yesterday for stool culture and C diff ordered  · - WBC count 17 57 yesterday today 10 52, patient is on steroids  ·   · Leukocytosis:  · - patient with 1 episode of fever, no tachycardia blood pressure stable  · - on Zosyn day 2   · - also noted to be on steroids Solu-Medrol 20 mg q 12  ·   · Hypothyroidism:  · - continue Synthroid  ·   · Generalized anxiety disorder:  · - supportive care      VTE Pharmacologic Prophylaxis:   Pharmacologic: Enoxaparin (Lovenox)  Mechanical VTE Prophylaxis in Place: Yes    Patient Centered Rounds: I have performed bedside rounds with nursing staff today  Discussions with Specialists or Other Care Team Provider: nursing     Education and Discussions with Family / Patient: patient     Time Spent for Care: 30 minutes  More than 50% of total time spent on counseling and coordination of care as described above  Current Length of Stay: 1 day(s)    Current Patient Status: Inpatient   Certification Statement: The patient will continue to require additional inpatient hospital stay due to uncontrolled pain     Discharge Plan / Estimated Discharge Date: discharge home when medically stable     Code Status: Level 1 - Full Code      Subjective:   Pt in bed states she is in so much pain she has been waiting for her tramadol for about 20 minutes   Pt has a large [facility] filing cabinet on the window ledge with her extensive medical list of problems  Pt has tenderness in her right upper quadrant  She states that she has 7/10pain right now  Pt shows me a picture on her cell phone of some formed brown pieces of stool in the toilet with some slighly pink water  Objective:     Vitals:   Temp (24hrs), Av 1 °F (37 3 °C), Min:98 2 °F (36 8 °C), Max:101 °F (38 3 °C)    HR:  [71-73] 73  Resp:  [17-18] 18  BP: ()/(57-64) 100/57  SpO2:  [95 %-99 %] 99 %  Body mass index is 23 3 kg/m²  Input and Output Summary (last 24 hours): Intake/Output Summary (Last 24 hours) at 17 0941  Last data filed at 17 0537   Gross per 24 hour   Intake              100 ml   Output             2300 ml   Net            -2200 ml       Physical Exam:     Physical Exam   Constitutional: She is oriented to person, place, and time  She appears well-developed and well-nourished  No distress  HENT:   Head: Normocephalic and atraumatic  Eyes: Conjunctivae and EOM are normal  Pupils are equal, round, and reactive to light  Right eye exhibits no discharge  Left eye exhibits no discharge  No scleral icterus  Neck: No tracheal deviation present  No thyromegaly present  Cardiovascular: Normal rate and regular rhythm  Exam reveals no gallop and no friction rub  No murmur heard  Pulmonary/Chest: No stridor  No respiratory distress  She has no wheezes  She has no rales  She exhibits no tenderness  Abdominal: She exhibits no distension and no mass  There is tenderness (right flank and mid quadrant )  There is no rebound and no guarding  Hyperactive bowel sounds   Musculoskeletal: She exhibits no edema, tenderness or deformity  Lymphadenopathy:     She has no cervical adenopathy  Neurological: She is oriented to person, place, and time  Skin: No rash noted  She is not diaphoretic  No erythema  No pallor  Psychiatric: She has a normal mood and affect             Additional Data:     Labs:      Results from last 7 days  Lab Units 09/11/17  0550  09/09/17  1910   WBC Thousand/uL 10 52*  < > 11 63*   HEMOGLOBIN g/dL 14 1  < > 14 1   HEMATOCRIT % 42 3  < > 42 4   PLATELETS Thousands/uL 426*  < > 435*   LYMPHO PCT %  --   --  3*   MONO PCT MAN %  --   --  3*   EOSINO PCT MANUAL %  --   --  0   < > = values in this interval not displayed  Results from last 7 days  Lab Units 09/11/17  0550 09/10/17  0531   SODIUM mmol/L 138 140   POTASSIUM mmol/L 4 1 3 6   CHLORIDE mmol/L 104 107   CO2 mmol/L 26 27   BUN mg/dL 13 13   CREATININE mg/dL 0 70 0 71   CALCIUM mg/dL 8 6 8 0*   TOTAL PROTEIN g/dL  --  6 0*   BILIRUBIN TOTAL mg/dL  --  0 76   ALK PHOS U/L  --  45*   ALT U/L  --  26   AST U/L  --  10   GLUCOSE RANDOM mg/dL 80 82           * I Have Reviewed All Lab Data Listed Above  * Additional Pertinent Lab Tests Reviewed: All Labs Within Last 24 Hours Reviewed    Imaging:    Imaging Reports Reviewed Today Include: reviewed       Recent Cultures (last 7 days):           Last 24 Hours Medication List:     aspirin 325 mg Oral Daily   enoxaparin 40 mg Subcutaneous Daily   levothyroxine 75 mcg Oral Early Morning   liothyronine 5 mcg Oral Early Morning   methylPREDNISolone sodium succinate 20 mg Intravenous Q12H KARISSA   pantoprazole 40 mg Intravenous Q12H [facility]   piperacillin-tazobactam 3 375 g Intravenous Q6H        Today, Patient Was Seen By: JONATHAN Swain    ** Please Note: This note has been constructed using a voice recognition system   **

## 2017-09-11 NOTE — SOCIAL WORK
CM met with pt and pt aware cm role at discharge   Pt lives in a house with her   And 3 children   There are 8 steps to get in and 13 steps to get to bedroom and bathroom   Prior to admission pt was independent all ADL" S  Pt denies any DME" S, VNA , or SNF   Pt denies any mental health or [facility] rehab in the past  Pt uses hometown pharmacy and on day of discharge her  Igor Parson [phone] will transport home   CM reviewed d/c planning process including the following: identifying help at home, patient preference for d/c planning needs, Discharge Lounge, [facility] Meds to Bed program, availability of treatment team to discuss questions or concerns patient and/or family may have regarding understanding medications and recognizing signs and symptoms once discharged  CM also encouraged patient to follow up with all recommended appointments after discharge  Patient advised of importance for patient and family to participate in managing patients medical well being

## 2017-09-11 NOTE — PLAN OF CARE
Problem: DISCHARGE PLANNING - CARE MANAGEMENT  Goal: Discharge to post-acute care or home with appropriate resources  INTERVENTIONS:  - Conduct assessment to determine patient/family and health care team treatment goals, and need for post-acute services based on payer coverage, community resources, and patient preferences, and barriers to discharge  - Address psychosocial, clinical, and financial barriers to discharge as identified in assessment in conjunction with the patient/family and health care team  - Arrange appropriate level of post-acute services according to patient's   needs and preference and payer coverage in collaboration with the physician and health care team  - Communicate with and update the patient/family, physician, and health care team regarding progress on the discharge plan  - Arrange appropriate transportation to post-acute venues  When medically clear will discharge home with family   Outcome: Progressing

## 2017-09-12 ENCOUNTER — GENERIC CONVERSION - ENCOUNTER (OUTPATIENT)
Dept: OTHER | Facility: OTHER | Age: 42
End: 2017-09-12

## 2017-09-12 ENCOUNTER — ANESTHESIA (INPATIENT)
Dept: GASTROENTEROLOGY | Facility: HOSPITAL | Age: 42
DRG: 392 | End: 2017-09-12
Payer: COMMERCIAL

## 2017-09-12 ENCOUNTER — ANESTHESIA EVENT (INPATIENT)
Dept: GASTROENTEROLOGY | Facility: HOSPITAL | Age: 42
DRG: 392 | End: 2017-09-12
Payer: COMMERCIAL

## 2017-09-12 LAB — C DIFF TOX GENS STL QL NAA+PROBE: NORMAL

## 2017-09-12 PROCEDURE — 0DB98ZX EXCISION OF DUODENUM, VIA NATURAL OR ARTIFICIAL OPENING ENDOSCOPIC, DIAGNOSTIC: ICD-10-PCS | Performed by: INTERNAL MEDICINE

## 2017-09-12 PROCEDURE — 88305 TISSUE EXAM BY PATHOLOGIST: CPT | Performed by: INTERNAL MEDICINE

## 2017-09-12 PROCEDURE — 88342 IMHCHEM/IMCYTCHM 1ST ANTB: CPT | Performed by: INTERNAL MEDICINE

## 2017-09-12 PROCEDURE — C9113 INJ PANTOPRAZOLE SODIUM, VIA: HCPCS | Performed by: HOSPITALIST

## 2017-09-12 PROCEDURE — 0DBE8ZX EXCISION OF LARGE INTESTINE, VIA NATURAL OR ARTIFICIAL OPENING ENDOSCOPIC, DIAGNOSTIC: ICD-10-PCS | Performed by: INTERNAL MEDICINE

## 2017-09-12 PROCEDURE — 0DB68ZX EXCISION OF STOMACH, VIA NATURAL OR ARTIFICIAL OPENING ENDOSCOPIC, DIAGNOSTIC: ICD-10-PCS | Performed by: INTERNAL MEDICINE

## 2017-09-12 RX ORDER — DIPHENHYDRAMINE HYDROCHLORIDE 50 MG/ML
INJECTION INTRAMUSCULAR; INTRAVENOUS AS NEEDED
Status: DISCONTINUED | OUTPATIENT
Start: 2017-09-12 | End: 2017-09-12 | Stop reason: SURG

## 2017-09-12 RX ORDER — PROPOFOL 10 MG/ML
INJECTION, EMULSION INTRAVENOUS CONTINUOUS PRN
Status: DISCONTINUED | OUTPATIENT
Start: 2017-09-12 | End: 2017-09-12

## 2017-09-12 RX ORDER — PROPOFOL 10 MG/ML
INJECTION, EMULSION INTRAVENOUS AS NEEDED
Status: DISCONTINUED | OUTPATIENT
Start: 2017-09-12 | End: 2017-09-12 | Stop reason: SURG

## 2017-09-12 RX ORDER — LORAZEPAM 1 MG/1
1 TABLET ORAL EVERY 6 HOURS PRN
Status: DISCONTINUED | OUTPATIENT
Start: 2017-09-12 | End: 2017-09-29 | Stop reason: HOSPADM

## 2017-09-12 RX ADMIN — DEXAMETHASONE SODIUM PHOSPHATE 10 MG: 10 INJECTION INTRAMUSCULAR; INTRAVENOUS at 14:11

## 2017-09-12 RX ADMIN — PROPOFOL 20 MG: 10 INJECTION, EMULSION INTRAVENOUS at 14:34

## 2017-09-12 RX ADMIN — MEPERIDINE HYDROCHLORIDE 50 MG: 50 INJECTION, SOLUTION INTRAMUSCULAR; INTRAVENOUS; SUBCUTANEOUS at 02:20

## 2017-09-12 RX ADMIN — ENOXAPARIN SODIUM 40 MG: 40 INJECTION SUBCUTANEOUS at 08:44

## 2017-09-12 RX ADMIN — MEPERIDINE HYDROCHLORIDE 50 MG: 50 INJECTION, SOLUTION INTRAMUSCULAR; INTRAVENOUS; SUBCUTANEOUS at 15:58

## 2017-09-12 RX ADMIN — SODIUM CHLORIDE 80 ML/HR: 0.9 INJECTION, SOLUTION INTRAVENOUS at 23:06

## 2017-09-12 RX ADMIN — PROPOFOL 50 MG: 10 INJECTION, EMULSION INTRAVENOUS at 14:19

## 2017-09-12 RX ADMIN — PROPOFOL 50 MG: 10 INJECTION, EMULSION INTRAVENOUS at 14:29

## 2017-09-12 RX ADMIN — TRAMADOL HYDROCHLORIDE 50 MG: 50 TABLET, COATED ORAL at 08:45

## 2017-09-12 RX ADMIN — METHYLPREDNISOLONE SODIUM SUCCINATE 20 MG: 40 INJECTION, POWDER, FOR SOLUTION INTRAMUSCULAR; INTRAVENOUS at 08:44

## 2017-09-12 RX ADMIN — TRAMADOL HYDROCHLORIDE 50 MG: 50 TABLET, COATED ORAL at 02:20

## 2017-09-12 RX ADMIN — MEPERIDINE HYDROCHLORIDE 50 MG: 50 INJECTION, SOLUTION INTRAMUSCULAR; INTRAVENOUS; SUBCUTANEOUS at 20:28

## 2017-09-12 RX ADMIN — SODIUM CHLORIDE: 0.9 INJECTION, SOLUTION INTRAVENOUS at 14:12

## 2017-09-12 RX ADMIN — PROPOFOL 50 MG: 10 INJECTION, EMULSION INTRAVENOUS at 14:25

## 2017-09-12 RX ADMIN — MEPERIDINE HYDROCHLORIDE 50 MG: 50 INJECTION, SOLUTION INTRAMUSCULAR; INTRAVENOUS; SUBCUTANEOUS at 09:54

## 2017-09-12 RX ADMIN — DIPHENHYDRAMINE HYDROCHLORIDE 25 MG: 50 INJECTION, SOLUTION INTRAMUSCULAR; INTRAVENOUS at 00:29

## 2017-09-12 RX ADMIN — PROPOFOL 50 MG: 10 INJECTION, EMULSION INTRAVENOUS at 14:23

## 2017-09-12 RX ADMIN — PROPOFOL 20 MG: 10 INJECTION, EMULSION INTRAVENOUS at 14:31

## 2017-09-12 RX ADMIN — DIPHENHYDRAMINE HYDROCHLORIDE 25 MG: 50 INJECTION, SOLUTION INTRAMUSCULAR; INTRAVENOUS at 08:44

## 2017-09-12 RX ADMIN — PIPERACILLIN SODIUM AND TAZOBACTAM SODIUM 3.38 G: 36; 4.5 INJECTION, POWDER, FOR SOLUTION INTRAVENOUS at 17:08

## 2017-09-12 RX ADMIN — PROPOFOL 100 MG: 10 INJECTION, EMULSION INTRAVENOUS at 14:12

## 2017-09-12 RX ADMIN — PIPERACILLIN SODIUM AND TAZOBACTAM SODIUM 3.38 G: 36; 4.5 INJECTION, POWDER, FOR SOLUTION INTRAVENOUS at 23:06

## 2017-09-12 RX ADMIN — PANTOPRAZOLE SODIUM 40 MG: 40 INJECTION, POWDER, FOR SOLUTION INTRAVENOUS at 08:44

## 2017-09-12 RX ADMIN — LIOTHYRONINE SODIUM 5 MCG: 5 TABLET ORAL at 05:13

## 2017-09-12 RX ADMIN — PROPOFOL 20 MG: 10 INJECTION, EMULSION INTRAVENOUS at 14:37

## 2017-09-12 RX ADMIN — LORAZEPAM 1 MG: 1 TABLET ORAL at 15:58

## 2017-09-12 RX ADMIN — PIPERACILLIN SODIUM AND TAZOBACTAM SODIUM 3.38 G: 36; 4.5 INJECTION, POWDER, FOR SOLUTION INTRAVENOUS at 04:36

## 2017-09-12 RX ADMIN — TOPICAL ANESTHETIC 1 SPRAY: 200 SPRAY DENTAL; PERIODONTAL at 14:11

## 2017-09-12 RX ADMIN — DIPHENHYDRAMINE HYDROCHLORIDE 25 MG: 50 INJECTION, SOLUTION INTRAMUSCULAR; INTRAVENOUS at 14:11

## 2017-09-12 RX ADMIN — PIPERACILLIN SODIUM AND TAZOBACTAM SODIUM 3.38 G: 36; 4.5 INJECTION, POWDER, FOR SOLUTION INTRAVENOUS at 10:00

## 2017-09-12 RX ADMIN — MEPERIDINE HYDROCHLORIDE 50 MG: 50 INJECTION, SOLUTION INTRAMUSCULAR; INTRAVENOUS; SUBCUTANEOUS at 06:01

## 2017-09-12 RX ADMIN — LEVOTHYROXINE SODIUM 75 MCG: 75 TABLET ORAL at 05:11

## 2017-09-12 RX ADMIN — PANTOPRAZOLE SODIUM 40 MG: 40 INJECTION, POWDER, FOR SOLUTION INTRAVENOUS at 20:28

## 2017-09-12 RX ADMIN — DIPHENHYDRAMINE HYDROCHLORIDE 25 MG: 50 INJECTION, SOLUTION INTRAMUSCULAR; INTRAVENOUS at 20:31

## 2017-09-12 RX ADMIN — LORAZEPAM 1 MG: 1 TABLET ORAL at 08:45

## 2017-09-12 RX ADMIN — METHYLPREDNISOLONE SODIUM SUCCINATE 20 MG: 40 INJECTION, POWDER, FOR SOLUTION INTRAMUSCULAR; INTRAVENOUS at 20:28

## 2017-09-12 NOTE — PLAN OF CARE
DISCHARGE PLANNING     Discharge to home or other facility with appropriate resources Progressing        DISCHARGE PLANNING - CARE MANAGEMENT     Discharge to post-acute care or home with appropriate resources Progressing        INFECTION - ADULT     Absence or prevention of progression during hospitalization Progressing     Absence of fever/infection during neutropenic period Progressing        Knowledge Deficit     Patient/family/caregiver demonstrates understanding of disease process, treatment plan, medications, and discharge instructions Progressing        Nutrition/Hydration-ADULT     Nutrient/Hydration intake appropriate for improving, restoring or maintaining nutritional needs Progressing        PAIN - ADULT     Verbalizes/displays adequate comfort level or baseline comfort level Progressing        Potential for Falls     Patient will remain free of falls Progressing        SAFETY ADULT     Maintain or return to baseline ADL function Progressing     Maintain or return mobility status to optimal level Progressing

## 2017-09-12 NOTE — PROGRESS NOTES
[facility] Progress Note  Patient: Aimee Lawrence 38 y o  female   MRN: [id number]  PCP: Rasta Gimenez PA-C  Unit/Bed#: [facility] 823-01 Encounter: [id number]  Date Of Visit: 17    Assessment:    Principal Problem:    Abdominal pain  Active Problems:    Colitis    Generalized anxiety disorder    Acquired hypothyroidism    Abnormal CT of the abdomen    Leukocytosis    Thrombophlebitis    RUQ pain      Plan:    · Right upper quadrant abdominal pain with sphincter of Oddi dysfunction with associated generalized pain  Acute Pain service following patient  Continue with current regimen  Will need close outpatient follow-up   · Patient is status post ERCP x2 with sphincterotomy  Liver function tests normal   GI following  Tentative EGD scheduled  Continue supportive care  Right upper quadrant ultrasound shows dilated common bile duct which is about 1 3 cm previously noted on study 2017  To consider MRCP if concern for choledocholithiasis continues  Continue with IV fluids  · Inflammatory bowel disease-ulcerative colitis-possible Crohn's  C diff study ordered  · Febrile illness-monitor symptoms  · Leukocytosis continued to monitor symptoms  · Hypothyroidism-Synthroid  · Generalized anxiety disorder    VTE Pharmacologic Prophylaxis:   Pharmacologic: Enoxaparin (Lovenox)  Mechanical VTE Prophylaxis in Place: Yes    Patient Centered Rounds: I have performed bedside rounds with nursing staff today  Time Spent for Care: 15 minutes  More than 50% of total time spent on counseling and coordination of care as described above  Current Length of Stay: 2 day(s)    Current Patient Status: Inpatient     Code Status: Level 1 - Full Code      Subjective:    The patient comfortable    Objective:     Vitals:   Temp (24hrs), Av 4 °F (36 9 °C), Min:97 7 °F (36 5 °C), Max:99 4 °F (37 4 °C)    HR:  [64-69] 69  Resp:  [16-18] 16  BP: ()/(57-73) 113/73  SpO2:  [95 %-100 %] 100 %  Body mass index is 23 3 kg/m²  Input and Output Summary (last 24 hours): Intake/Output Summary (Last 24 hours) at 09/12/17 1010  Last data filed at 09/12/17 0701   Gross per 24 hour   Intake             2920 ml   Output             4400 ml   Net            -1480 ml       Physical Exam:     Physical Exam   Constitutional: She is oriented to person, place, and time  She appears well-developed and well-nourished  HENT:   Head: Normocephalic and atraumatic  Eyes: Conjunctivae are normal  Pupils are equal, round, and reactive to light  Neck: Normal range of motion  Neck supple  No JVD present  No tracheal deviation present  No thyromegaly present  Cardiovascular: Normal rate and regular rhythm  No murmur heard  Pulmonary/Chest: Effort normal and breath sounds normal  No stridor  No respiratory distress  She has no wheezes  She has no rales  Abdominal: Soft  Bowel sounds are normal  She exhibits no distension  There is no tenderness  Musculoskeletal: She exhibits no edema  Neurological: She is alert and oriented to person, place, and time  Skin: Skin is warm and dry  No erythema  Psychiatric: She has a normal mood and affect  Additional Data:     Labs:      Results from last 7 days  Lab Units 09/11/17  0550  09/09/17  1910   WBC Thousand/uL 10 52*  < > 11 63*   HEMOGLOBIN g/dL 14 1  < > 14 1   HEMATOCRIT % 42 3  < > 42 4   PLATELETS Thousands/uL 426*  < > 435*   LYMPHO PCT %  --   --  3*   MONO PCT MAN %  --   --  3*   EOSINO PCT MANUAL %  --   --  0   < > = values in this interval not displayed      Results from last 7 days  Lab Units 09/11/17  0550 09/10/17  0531   SODIUM mmol/L 138 140   POTASSIUM mmol/L 4 1 3 6   CHLORIDE mmol/L 104 107   CO2 mmol/L 26 27   BUN mg/dL 13 13   CREATININE mg/dL 0 70 0 71   CALCIUM mg/dL 8 6 8 0*   TOTAL PROTEIN g/dL  --  6 0*   BILIRUBIN TOTAL mg/dL  --  0 76   ALK PHOS U/L  --  45*   ALT U/L  --  26   AST U/L  --  10   GLUCOSE RANDOM mg/dL 80 82 * I Have Reviewed All Lab Data Listed Above  * Additional Pertinent Lab Tests Reviewed: The patient will continue to require additional inpatient hospital stay due to Need to monitor symptoms        Recent Cultures (last 7 days):       Results from last 7 days  Lab Units 09/09/17 1935 09/09/17 1934   BLOOD CULTURE  No Growth at 24 hrs  No Growth at 24 hrs  Last 24 Hours Medication List:     aspirin 325 mg Oral Daily   enoxaparin 40 mg Subcutaneous Daily   levothyroxine 75 mcg Oral Early Morning   liothyronine 5 mcg Oral Early Morning   methylPREDNISolone sodium succinate 20 mg Intravenous Q12H KARISSA   pantoprazole 40 mg Intravenous Q12H [address]   piperacillin-tazobactam 3 375 g Intravenous Q6H        Today, Patient Was Seen By: Maryln Mohs, DO    ** Please Note: This note has been constructed using a voice recognition system   **

## 2017-09-12 NOTE — PROGRESS NOTES
Progress Note - Inpatient Pain Management    Haydee Lea 38 y o  female MRN: [id number]  Unit/Bed#: [facility] 823-01 Encounter: [id number]      Assessment:   Principal Problem:    Abdominal pain  Active Problems:    Colitis    Generalized anxiety disorder    Acquired hypothyroidism    Abnormal CT of the abdomen    Leukocytosis    Thrombophlebitis    RUQ pain      Plan/Recommendations:   Acute Abdominal pain  · Steroids per primary team  · Tramadol 50mg PO Q6 hours PRN for severe pain  · Demerol 50mg IV Q4 hours PRN breakthrough pain  · Plan is for colonoscopy later today  Will wean off pain medication prior to di/c home  Reviewed with SLIM    Pain History  Current pain location(s): abdominal   Pain Scale:   5-8  Severity:  Severe at times  24 hour history: Patient resting in bed, appears more comfortable today  Overall pain is better controlled today on current regimen  Patient is happy with the plan of care       Current Analgesic regimen:  Solu-Medrol, Tylenol PRN, Demerol 50mg IV Q4 PRN (5 doses), Tramadol 50mg PO Q6 PRN (3 doses)  Bowel Regimen: Bowel prep overnight     Meds/Allergies   all current active meds have been reviewed and current meds:   Current Facility-Administered Medications   Medication Dose Route Frequency    acetaminophen (TYLENOL) tablet 650 mg  650 mg Oral Q6H PRN    aspirin tablet 325 mg  325 mg Oral Daily    diphenhydrAMINE (BENADRYL) injection 25 mg  25 mg Intravenous Q8H PRN    enoxaparin (LOVENOX) subcutaneous injection 40 mg  40 mg Subcutaneous Daily    hyoscyamine (LEVSIN/SL) SL tablet 0 125 mg  0 125 mg Sublingual Q4H PRN    levothyroxine tablet 75 mcg  75 mcg Oral Early Morning    liothyronine (CYTOMEL) tablet 5 mcg  5 mcg Oral Early Morning    meperidine (DEMEROL) 50 mg/mL injection 50 mg  50 mg Intravenous Q4H PRN    methylPREDNISolone sodium succinate (Solu-MEDROL) injection 20 mg  20 mg Intravenous Q12H [address]    ondansetron (ZOFRAN) injection 4 mg  4 mg Intravenous Q6H PRN  pantoprazole (PROTONIX) injection 40 mg  40 mg Intravenous Q12H [facility]    piperacillin-tazobactam (ZOSYN) 3 375 g in sodium chloride 0 9 % 50 mL IVPB  3 375 g Intravenous Q6H    sodium chloride 0 9 % infusion  80 mL/hr Intravenous Continuous    traMADol (ULTRAM) tablet 50 mg  50 mg Oral Q6H PRN       Allergies   Allergen Reactions    Aspergillus Fumigatus Other (See Comments), Hives and Swelling    Imitrex [Sumatriptan] Anaphylaxis     Bradycardia, sob, back pressure, head pain,throat tightness    Corn Dextrin     Imuran [Azathioprine] Other (See Comments)     pancreatitis    Sulfa Antibiotics Hives       Objective     Temp:  [97 7 °F (36 5 °C)-98 5 °F (36 9 °C)] 97 7 °F (36 5 °C)  HR:  [66-69] 69  Resp:  [16-18] 16  BP: ()/(57-73) 113/73      Intake/Output Summary (Last 24 hours) at 09/12/17 1241  Last data filed at 09/12/17 1038   Gross per 24 hour   Intake             2920 ml   Output             4400 ml   Net            -1480 ml     Last Bowel Movement: bowel prep overnight              Physical Exam: /73   Pulse 69   Temp 97 7 °F (36 5 °C) (Oral)   Resp 16   Ht 5' 5" (1 651 m)   Wt 63 5 kg (140 lb)   SpO2 100%   BMI 23 30 kg/m²     General Appearance:    Alert, cooperative, no distress   Neurological:   Oriented to person, place, and time   Head:    Normocephalic, without obvious abnormality, atraumatic   Eyes:    EOM's intact   Lungs:     Clear to auscultation bilaterally, respirations unlabored   Chest Wall:    No tenderness or deformity   Abdomen:        Soft, round    Heart:    Regular rate and rhythm, S1 and S2 normal   Extremities:   Extremities no edema, intact sensation to light touch   Pulses:   2+ and symmetric all extremities   Skin:   Skin color pale, no rashes or lesions     Lab Results:   Results from last 7 days  Lab Units 09/11/17  0550   WBC Thousand/uL 10 52*   HEMOGLOBIN g/dL 14 1   HEMATOCRIT % 42 3   PLATELETS Thousands/uL 426*      Results from last 7 days  Lab Units 09/11/17  0550 09/10/17  0531   SODIUM mmol/L 138 140   POTASSIUM mmol/L 4 1 3 6   CHLORIDE mmol/L 104 107   CO2 mmol/L 26 27   BUN mg/dL 13 13   CREATININE mg/dL 0 70 0 71   CALCIUM mg/dL 8 6 8 0*   TOTAL PROTEIN g/dL  --  6 0*   BILIRUBIN TOTAL mg/dL  --  0 76   ALK PHOS U/L  --  45*   ALT U/L  --  26   AST U/L  --  10   GLUCOSE RANDOM mg/dL 80 82       Imaging Studies: No new imaging reports  Counseling / Coordination of Care  Total floor / unit time spent today 15 minutes  Greater than 50% of total time was spent with the patient and / or family counseling and / or coordination of care   A description of the counseling / coordination of care: Reviewed plan of care and medications with patient, RN staff and primary care team     Rossi Coffman, MS, RN-BC  Acute Pain

## 2017-09-12 NOTE — OP NOTE
**** GI/ENDOSCOPY REPORT ****     PATIENT NAME: Chi EPPERSON - VISIT ID:  Patient ID: [id number] YOB: 1975     INTRODUCTION: Esophagogastroduodenoscopy - A 38 female patient presents   for an inpatient Esophagogastroduodenoscopy at [address] [place] [address]  INDICATIONS: Pain located in the right upper quadrant of the abdomen  Diarrhea  CONSENT: The benefits, risks, and alternatives to the procedure were   discussed and informed consent was obtained from the patient  PREPARATION:  EKG, pulse, pulse oximetry and blood pressure were monitored   throughout the procedure  ASA Classification: Class 3 - Patient has severe   systemic disturbance that may or may not be related to the disorder   requiring surgery  MEDICATIONS: Anesthesia-check records     PROCEDURE:  The endoscope was passed without difficulty through the mouth   under direct visualization and advanced to the 2nd portion of the   duodenum  The scope was withdrawn and the mucosa was carefully examined  Retroflexion was performed  FINDINGS:   Esophagus: The esophagus appeared to be normal     GE   junction: The GE junction appeared to be normal   Stomach: The stomach   appeared to be normal     Multiple random biopsies was taken  Duodenum:   The duodenum appeared to be normal    Multiple random biopsies was taken  COMPLICATIONS: There were no complications  IMPRESSIONS: Normal esophagus  Normal GE junction  Normal stomach  Multiple biopsies taken  Normal duodenum  Multiple biopsies taken  RECOMMENDATIONS: Continue current medications  Anti-reflux measures: Raise   the head of the bed 4 to 6 inches  Avoid smoking  Avoid excess coffee, tea   or other caffeinated beverages  Avoid garments that fit tightly through   the abdomen  Avoid eating before bed  Follow-up on the results of the   biopsy specimens  Colonoscopy to follow       ESTIMATED BLOOD LOSS:     PATHOLOGY SPECIMENS: Multiple random biopsies taken  Multiple random   biopsies taken  PROCEDURE CODES:     ICD-9 Codes: 789 01 Abdominal pain, right upper quadrant 787 91 Diarrhea     ICD-10 Codes: R10 11 Right upper quadrant pain R19 7 Diarrhea, unspecified     PERFORMED BY: AMIRA Chapman  on 09/12/2017  Version 1, electronically signed by AMIRA Swenson  on 09/12/2017   at 14:23

## 2017-09-12 NOTE — PROGRESS NOTES
Patient is scheduled for a colonoscopy today and during report, is still continuing with unfinished preparation  The nurse from overnight told me that Paloma Agee is okay with her drinking the other half prior to the procedure  JAY was paged to find out what time exactly the prep should be stopped but I was instructed by  Dr Lobo Smith to page the physician assistant for the morning  [facility]  is unable to page this person until 8 am  Will continue with the prep until this person is notified

## 2017-09-12 NOTE — OP NOTE
**** GI/ENDOSCOPY REPORT ****     PATIENT NAME: João EPPERSON ------ VISIT ID:  Patient ID: [id number]   YOB: 1975     INTRODUCTION: Colonoscopy - A 38 female patient presents for an inpatient   Colonoscopy at [facility] (formerly [facility])  PREVIOUS COLONOSCOPY:     INDICATIONS: Hx IBD, colon thickening on CT, and RUQ pain  CONSENT:  The benefits, risks, and alternatives to the procedure were   discussed and informed consent was obtained from the patient  PREPARATION: EKG, pulse, pulse oximetry and blood pressure were monitored   throughout the procedure  The patient was identified by myself both   verbally and by visual inspection of ID band  Airway Assessment   Classification: Airway class 2 - Visualization of the soft palate, fauces   and uvula  ASA Classification: Class 2 - Patient has mild to moderate   systemic disturbance that may or may not be related to the disorder   requiring surgery  MEDICATIONS: Anesthesia-check records     PROCEDURE:  The endoscope was passed without difficulty through the anus   under direct visualization and advanced to the terminal ileum  The scope   was withdrawn and the mucosa was carefully examined  The quality of the   preparation was good  Retroflexion was performed  Cecal Intubation Time: 5   minutes(s) Scope Withdrawal Time: 14 minutes(s)     RECTAL EXAM: Normal rectal exam      FINDINGS:  The terminal ileum appeared to be normal  The entire colon   appeared to be normal  Multiple random biopsies was taken  COMPLICATIONS: There were no complications  IMPRESSIONS: Normal terminal ileum  Normal entire colon  Multiple biopsies   taken  RECOMMENDATIONS: Follow-up on the results of the biopsy specimens  Return   to floor and resume diet  ESTIMATED BLOOD LOSS:     PATHOLOGY SPECIMENS: Multiple random biopsies taken       PROCEDURE CODES:     ICD-9 Codes:     ICD-10 Codes:     PERFORMED BY: AMIRA Morales  on 09/12/2017  Version 1, electronically signed by AMIRA Yoo  on 09/12/2017   at 14:59

## 2017-09-12 NOTE — PROGRESS NOTES
Pt ordered Golytely for colonoscopy this AM   Order reads to give a half before midnight and other half from 4-6am   First half drank by midnight with much encouragement  At 0600, pt noted to have not finished the rest of golytely and BM remains brown and loose  It was explained to the patient that the colonoscopy could not be performed unless all of the med was consumed and bowel movements become clear  PAPETROS Baca made aware that Golytely not finished at this time  PA-C stated to continue to encourage patient to drink as much as she can for the next 45 minutes    Pt made aware, responding, "I'm doing the best that I can "

## 2017-09-13 ENCOUNTER — APPOINTMENT (INPATIENT)
Dept: RADIOLOGY | Facility: HOSPITAL | Age: 42
DRG: 392 | End: 2017-09-13
Payer: COMMERCIAL

## 2017-09-13 PROCEDURE — 74181 MRI ABDOMEN W/O CONTRAST: CPT

## 2017-09-13 PROCEDURE — 76376 3D RENDER W/INTRP POSTPROCES: CPT

## 2017-09-13 PROCEDURE — C9113 INJ PANTOPRAZOLE SODIUM, VIA: HCPCS | Performed by: HOSPITALIST

## 2017-09-13 RX ORDER — MEPERIDINE HYDROCHLORIDE 50 MG/ML
50 INJECTION INTRAMUSCULAR; INTRAVENOUS; SUBCUTANEOUS ONCE
Status: COMPLETED | OUTPATIENT
Start: 2017-09-13 | End: 2017-09-13

## 2017-09-13 RX ADMIN — TRAMADOL HYDROCHLORIDE 50 MG: 50 TABLET, COATED ORAL at 06:02

## 2017-09-13 RX ADMIN — DIPHENHYDRAMINE HYDROCHLORIDE 25 MG: 50 INJECTION, SOLUTION INTRAMUSCULAR; INTRAVENOUS at 08:06

## 2017-09-13 RX ADMIN — PIPERACILLIN SODIUM AND TAZOBACTAM SODIUM 3.38 G: 36; 4.5 INJECTION, POWDER, FOR SOLUTION INTRAVENOUS at 10:38

## 2017-09-13 RX ADMIN — METHYLPREDNISOLONE SODIUM SUCCINATE 20 MG: 40 INJECTION, POWDER, FOR SOLUTION INTRAMUSCULAR; INTRAVENOUS at 08:06

## 2017-09-13 RX ADMIN — DIPHENHYDRAMINE HYDROCHLORIDE 25 MG: 50 INJECTION, SOLUTION INTRAMUSCULAR; INTRAVENOUS at 16:20

## 2017-09-13 RX ADMIN — MEPERIDINE HYDROCHLORIDE 50 MG: 50 INJECTION, SOLUTION INTRAMUSCULAR; INTRAVENOUS; SUBCUTANEOUS at 00:54

## 2017-09-13 RX ADMIN — ONDANSETRON 4 MG: 2 INJECTION INTRAMUSCULAR; INTRAVENOUS at 08:12

## 2017-09-13 RX ADMIN — PANTOPRAZOLE SODIUM 40 MG: 40 INJECTION, POWDER, FOR SOLUTION INTRAVENOUS at 08:06

## 2017-09-13 RX ADMIN — LEVOTHYROXINE SODIUM 75 MCG: 75 TABLET ORAL at 05:03

## 2017-09-13 RX ADMIN — MEPERIDINE HYDROCHLORIDE 50 MG: 50 INJECTION, SOLUTION INTRAMUSCULAR; INTRAVENOUS; SUBCUTANEOUS at 11:34

## 2017-09-13 RX ADMIN — TRAMADOL HYDROCHLORIDE 50 MG: 50 TABLET, COATED ORAL at 13:59

## 2017-09-13 RX ADMIN — PIPERACILLIN SODIUM AND TAZOBACTAM SODIUM 3.38 G: 36; 4.5 INJECTION, POWDER, FOR SOLUTION INTRAVENOUS at 16:13

## 2017-09-13 RX ADMIN — PIPERACILLIN SODIUM AND TAZOBACTAM SODIUM 3.38 G: 36; 4.5 INJECTION, POWDER, FOR SOLUTION INTRAVENOUS at 22:49

## 2017-09-13 RX ADMIN — MEPERIDINE HYDROCHLORIDE 50 MG: 50 INJECTION, SOLUTION INTRAMUSCULAR; INTRAVENOUS; SUBCUTANEOUS at 08:06

## 2017-09-13 RX ADMIN — PIPERACILLIN SODIUM AND TAZOBACTAM SODIUM 3.38 G: 36; 4.5 INJECTION, POWDER, FOR SOLUTION INTRAVENOUS at 05:02

## 2017-09-13 RX ADMIN — LORAZEPAM 1 MG: 1 TABLET ORAL at 20:25

## 2017-09-13 RX ADMIN — MEPERIDINE HYDROCHLORIDE 50 MG: 50 INJECTION, SOLUTION INTRAMUSCULAR; INTRAVENOUS; SUBCUTANEOUS at 20:18

## 2017-09-13 RX ADMIN — LIOTHYRONINE SODIUM 5 MCG: 5 TABLET ORAL at 05:59

## 2017-09-13 RX ADMIN — LORAZEPAM 1 MG: 1 TABLET ORAL at 11:07

## 2017-09-13 RX ADMIN — PANTOPRAZOLE SODIUM 40 MG: 40 INJECTION, POWDER, FOR SOLUTION INTRAVENOUS at 20:27

## 2017-09-13 RX ADMIN — MEPERIDINE HYDROCHLORIDE 50 MG: 50 INJECTION, SOLUTION INTRAMUSCULAR; INTRAVENOUS; SUBCUTANEOUS at 16:12

## 2017-09-13 RX ADMIN — ENOXAPARIN SODIUM 40 MG: 40 INJECTION SUBCUTANEOUS at 08:06

## 2017-09-13 RX ADMIN — TRAMADOL HYDROCHLORIDE 50 MG: 50 TABLET, COATED ORAL at 21:48

## 2017-09-13 RX ADMIN — LORAZEPAM 1 MG: 1 TABLET ORAL at 00:54

## 2017-09-13 NOTE — PROGRESS NOTES
Progress Note - Inpatient Pain Management    Nazario Casey 38 y o  female MRN: [id number]  Unit/Bed#: [facility] 823-01 Encounter: [id number]      Assessment:   Principal Problem:    Abdominal pain  Active Problems:    Colitis    Generalized anxiety disorder    Acquired hypothyroidism    Abnormal CT of the abdomen    Leukocytosis    Thrombophlebitis    RUQ pain      Plan/Recommendations:   Acute Abdominal pain  · Tramadol 50mg PO Q6 hours PRN for severe pain  · Demerol 50mg IV Q4 hours PRN breakthrough pain  · Plan is for MRCP later today  As abdominal pain improves and patient tolerates diet, would wean off IV opioids  Wean off pain medication prior to di/c home  Will sign off  Please call with any questions  Reviewed with SLIM    Pain History  Current pain location(s): abdominal   Pain Scale:   4-10  Severity:  Severe at times  24 hour history: Patient resting in bed reporting severe RUQ pain after eating breakfast this am  Started about 30 minutes after eating  Overall was doing well after EGD/Colonscopy yesterday and pain was controlled until this am  Plan is for MRCP today      Current Analgesic regimen:  Tylenol PRN, Demerol 50mg IV Q4 PRN (5 doses), Tramadol 50mg PO Q6 PRN (1 dose)  Bowel Regimen: None    Meds/Allergies   all current active meds have been reviewed and current meds:   Current Facility-Administered Medications   Medication Dose Route Frequency    acetaminophen (TYLENOL) tablet 650 mg  650 mg Oral Q6H PRN    aspirin tablet 325 mg  325 mg Oral Daily    diphenhydrAMINE (BENADRYL) injection 25 mg  25 mg Intravenous Q8H PRN    enoxaparin (LOVENOX) subcutaneous injection 40 mg  40 mg Subcutaneous Daily    hyoscyamine (LEVSIN/SL) SL tablet 0 125 mg  0 125 mg Sublingual Q4H PRN    levothyroxine tablet 75 mcg  75 mcg Oral Early Morning    liothyronine (CYTOMEL) tablet 5 mcg  5 mcg Oral Early Morning    LORazepam (ATIVAN) tablet 1 mg  1 mg Oral Q6H PRN    meperidine (DEMEROL) 50 mg/mL injection 50 mg  50 mg Intravenous Q4H PRN    ondansetron (ZOFRAN) injection 4 mg  4 mg Intravenous Q6H PRN    pantoprazole (PROTONIX) injection 40 mg  40 mg Intravenous Q12H [address]    piperacillin-tazobactam (ZOSYN) 3 375 g in sodium chloride 0 9 % 50 mL IVPB  3 375 g Intravenous Q6H    sodium chloride 0 9 % infusion  80 mL/hr Intravenous Continuous    traMADol (ULTRAM) tablet 50 mg  50 mg Oral Q6H PRN       Allergies   Allergen Reactions    Aspergillus Fumigatus Other (See Comments), Hives and Swelling    Imitrex [Sumatriptan] Anaphylaxis     Bradycardia, sob, back pressure, head pain,throat tightness    Corn Dextrin     Imuran [Azathioprine] Other (See Comments)     pancreatitis    Sulfa Antibiotics Hives       Objective     Temp:  [98 1 °F (36 7 °C)-98 8 °F (37 1 °C)] 98 1 °F (36 7 °C)  HR:  [65-73] 68  Resp:  [15-18] 16  BP: (103-115)/(60-77) 115/77      Intake/Output Summary (Last 24 hours) at 09/13/17 1141  Last data filed at 09/13/17 0900   Gross per 24 hour   Intake          1402 67 ml   Output             1200 ml   Net           202 67 ml     Last Bowel Movement: bowel prep yesterday               Physical Exam: /77   Pulse 68   Temp 98 1 °F (36 7 °C) (Oral)   Resp 16   Ht 5' 5" (1 651 m)   Wt 63 5 kg (140 lb)   SpO2 97%   BMI 23 30 kg/m²     General Appearance:    Alert, cooperative, no distress   Neurological:   Oriented to person, place, and time   Head:    Normocephalic, without obvious abnormality, atraumatic   Eyes:    EOM's intact   Lungs:     Respirations unlabored   Chest Wall:    No deformity   Abdomen:        Round   Skin:   Skin color pale, no rashes or lesions     Lab Results:     Results from last 7 days  Lab Units 09/11/17  0550   WBC Thousand/uL 10 52*   HEMOGLOBIN g/dL 14 1   HEMATOCRIT % 42 3   PLATELETS Thousands/uL 426*        Results from last 7 days  Lab Units 09/11/17  0550 09/10/17  0531   SODIUM mmol/L 138 140   POTASSIUM mmol/L 4 1 3 6   CHLORIDE mmol/L 104 107 CO2 mmol/L 26 27   BUN mg/dL 13 13   CREATININE mg/dL 0 70 0 71   CALCIUM mg/dL 8 6 8 0*   TOTAL PROTEIN g/dL  --  6 0*   BILIRUBIN TOTAL mg/dL  --  0 76   ALK PHOS U/L  --  45*   ALT U/L  --  26   AST U/L  --  10   GLUCOSE RANDOM mg/dL 80 82       Imaging Studies: I have personally reviewed pertinent reports  Counseling / Coordination of Care  Total floor / unit time spent today 15 minutes  Greater than 50% of total time was spent with the patient and / or family counseling and / or coordination of care   A description of the counseling / coordination of care: Reviewed plan of care and medications with patient, RN staff and primary care team     Ezequiel Bauer MS, RN-BC  Acute Pain

## 2017-09-13 NOTE — PROGRESS NOTES
[facility] Progress Note  Patient: Zofia Vazquez 38 y o  female   MRN: [id number]  PCP: Giovany Schultz PA-C  Unit/Bed#: [facility] 823-01 Encounter: [id number]  Date Of Visit: 17    Assessment:    Principal Problem:    Abdominal pain  Active Problems:    Colitis    Generalized anxiety disorder    Acquired hypothyroidism    Abnormal CT of the abdomen    Leukocytosis    Thrombophlebitis    RUQ pain      Plan:    · Right upper quadrant abdominal pain with sphincter of Oddi dysfunction with associated generalized pain  · Acute pain service following patient  · Continue with current regimen  · ERCP x2 with sphincterotomy  · Status post endoscopy that was negative  · Discuss with Gastroenterology yesterday  Given common bile duct dilatation of 1 3 cm will pursue MRCP  · Inflammatory bowel disease-ulcerative colitis  Possible Crohn's history  C diff study is negative  · Febrile illness-continue to monitor  Afebrile currently  VTE Pharmacologic Prophylaxis:   Pharmacologic: Enoxaparin (Lovenox)  Mechanical VTE Prophylaxis in Place: Yes    Patient Centered Rounds: I have performed bedside rounds with nursing staff today  Time Spent for Care: 15 minutes  More than 50% of total time spent on counseling and coordination of care as described above  Current Length of Stay: 3 day(s)    Current Patient Status: Inpatient   Certification Statement: The patient will continue to require additional inpatient hospital stay due to Need to monitor symptoms        Code Status: Level 1 - Full Code      Subjective:   Patient comfortable    Objective:     Vitals:   Temp (24hrs), Av 4 °F (36 9 °C), Min:98 1 °F (36 7 °C), Max:98 8 °F (37 1 °C)    HR:  [65-73] 68  Resp:  [15-18] 16  BP: (103-115)/(60-77) 115/77  SpO2:  [96 %-100 %] 97 %  Body mass index is 23 3 kg/m²  Input and Output Summary (last 24 hours):        Intake/Output Summary (Last 24 hours) at 17 9325  Last data filed at 09/13/17 0532   Gross per 24 hour   Intake          1162 67 ml   Output             1200 ml   Net           -37 33 ml       Physical Exam:     Physical Exam   Constitutional: She is oriented to person, place, and time  She appears well-developed and well-nourished  HENT:   Head: Normocephalic and atraumatic  Eyes: Conjunctivae are normal  Pupils are equal, round, and reactive to light  Cardiovascular: Normal rate and regular rhythm  No murmur heard  Pulmonary/Chest: She has no wheezes  She has no rales  She exhibits no tenderness  Abdominal: Soft  Bowel sounds are normal  She exhibits no distension  There is no tenderness  There is no rebound  Musculoskeletal: Normal range of motion  She exhibits no edema  Neurological: She is alert and oriented to person, place, and time  No cranial nerve deficit  Coordination normal    Skin: Skin is warm and dry  No erythema  Psychiatric: She has a normal mood and affect  Additional Data:     Labs:      Results from last 7 days  Lab Units 09/11/17  0550  09/09/17  1910   WBC Thousand/uL 10 52*  < > 11 63*   HEMOGLOBIN g/dL 14 1  < > 14 1   HEMATOCRIT % 42 3  < > 42 4   PLATELETS Thousands/uL 426*  < > 435*   LYMPHO PCT %  --   --  3*   MONO PCT MAN %  --   --  3*   EOSINO PCT MANUAL %  --   --  0   < > = values in this interval not displayed  Results from last 7 days  Lab Units 09/11/17  0550 09/10/17  0531   SODIUM mmol/L 138 140   POTASSIUM mmol/L 4 1 3 6   CHLORIDE mmol/L 104 107   CO2 mmol/L 26 27   BUN mg/dL 13 13   CREATININE mg/dL 0 70 0 71   CALCIUM mg/dL 8 6 8 0*   TOTAL PROTEIN g/dL  --  6 0*   BILIRUBIN TOTAL mg/dL  --  0 76   ALK PHOS U/L  --  45*   ALT U/L  --  26   AST U/L  --  10   GLUCOSE RANDOM mg/dL 80 82           * I Have Reviewed All Lab Data Listed Above  * Additional Pertinent Lab Tests Reviewed:  All Labs Within Last 24 Hours Reviewed        Recent Cultures (last 7 days):       Results from last 7 days  Lab Units 09/11/17  2300 09/11/17  1122 09/09/17  1935 09/09/17 1934   BLOOD CULTURE   --  No Growth at 24 hrs  No Growth at 48 hrs  No Growth at 48 hrs  C DIFF TOXIN B  NEGATIVE for C difficle toxin by PCR    --   --   --        Last 24 Hours Medication List:     aspirin 325 mg Oral Daily   enoxaparin 40 mg Subcutaneous Daily   levothyroxine 75 mcg Oral Early Morning   liothyronine 5 mcg Oral Early Morning   pantoprazole 40 mg Intravenous Q12H [address]   piperacillin-tazobactam 3 375 g Intravenous Q6H        Today, Patient Was Seen By: Mary Key DO    ** Please Note: This note has been constructed using a voice recognition system   **

## 2017-09-14 LAB
BACTERIA STL CULT: NORMAL
BACTERIA STL CULT: NORMAL
HCT VFR BLD AUTO: 40.4 % (ref 34.8–46.1)
HGB BLD-MCNC: 13.2 G/DL (ref 11.5–15.4)

## 2017-09-14 PROCEDURE — 85018 HEMOGLOBIN: CPT | Performed by: PHYSICIAN ASSISTANT

## 2017-09-14 PROCEDURE — 85014 HEMATOCRIT: CPT | Performed by: PHYSICIAN ASSISTANT

## 2017-09-14 PROCEDURE — C9113 INJ PANTOPRAZOLE SODIUM, VIA: HCPCS | Performed by: HOSPITALIST

## 2017-09-14 RX ADMIN — PANTOPRAZOLE SODIUM 40 MG: 40 INJECTION, POWDER, FOR SOLUTION INTRAVENOUS at 21:15

## 2017-09-14 RX ADMIN — DIPHENHYDRAMINE HYDROCHLORIDE 25 MG: 50 INJECTION, SOLUTION INTRAMUSCULAR; INTRAVENOUS at 10:11

## 2017-09-14 RX ADMIN — TRAMADOL HYDROCHLORIDE 50 MG: 50 TABLET, COATED ORAL at 13:06

## 2017-09-14 RX ADMIN — DIPHENHYDRAMINE HYDROCHLORIDE 25 MG: 50 INJECTION, SOLUTION INTRAMUSCULAR; INTRAVENOUS at 21:15

## 2017-09-14 RX ADMIN — PIPERACILLIN SODIUM AND TAZOBACTAM SODIUM 3.38 G: 36; 4.5 INJECTION, POWDER, FOR SOLUTION INTRAVENOUS at 16:18

## 2017-09-14 RX ADMIN — SODIUM CHLORIDE 80 ML/HR: 0.9 INJECTION, SOLUTION INTRAVENOUS at 00:47

## 2017-09-14 RX ADMIN — MEPERIDINE HYDROCHLORIDE 50 MG: 50 INJECTION, SOLUTION INTRAMUSCULAR; INTRAVENOUS; SUBCUTANEOUS at 10:10

## 2017-09-14 RX ADMIN — SODIUM CHLORIDE 80 ML/HR: 0.9 INJECTION, SOLUTION INTRAVENOUS at 18:12

## 2017-09-14 RX ADMIN — LORAZEPAM 1 MG: 1 TABLET ORAL at 19:07

## 2017-09-14 RX ADMIN — TRAMADOL HYDROCHLORIDE 50 MG: 50 TABLET, COATED ORAL at 19:07

## 2017-09-14 RX ADMIN — MEPERIDINE HYDROCHLORIDE 50 MG: 50 INJECTION, SOLUTION INTRAMUSCULAR; INTRAVENOUS; SUBCUTANEOUS at 00:43

## 2017-09-14 RX ADMIN — ONDANSETRON 4 MG: 2 INJECTION INTRAMUSCULAR; INTRAVENOUS at 16:23

## 2017-09-14 RX ADMIN — DIPHENHYDRAMINE HYDROCHLORIDE 25 MG: 50 INJECTION, SOLUTION INTRAMUSCULAR; INTRAVENOUS at 00:42

## 2017-09-14 RX ADMIN — MEPERIDINE HYDROCHLORIDE 50 MG: 50 INJECTION, SOLUTION INTRAMUSCULAR; INTRAVENOUS; SUBCUTANEOUS at 04:59

## 2017-09-14 RX ADMIN — LEVOTHYROXINE SODIUM 75 MCG: 75 TABLET ORAL at 05:00

## 2017-09-14 RX ADMIN — PIPERACILLIN SODIUM AND TAZOBACTAM SODIUM 3.38 G: 36; 4.5 INJECTION, POWDER, FOR SOLUTION INTRAVENOUS at 23:24

## 2017-09-14 RX ADMIN — LIOTHYRONINE SODIUM 5 MCG: 5 TABLET ORAL at 05:00

## 2017-09-14 RX ADMIN — MEPERIDINE HYDROCHLORIDE 50 MG: 50 INJECTION, SOLUTION INTRAMUSCULAR; INTRAVENOUS; SUBCUTANEOUS at 21:15

## 2017-09-14 RX ADMIN — MEPERIDINE HYDROCHLORIDE 50 MG: 50 INJECTION, SOLUTION INTRAMUSCULAR; INTRAVENOUS; SUBCUTANEOUS at 16:18

## 2017-09-14 RX ADMIN — PIPERACILLIN SODIUM AND TAZOBACTAM SODIUM 3.38 G: 36; 4.5 INJECTION, POWDER, FOR SOLUTION INTRAVENOUS at 05:01

## 2017-09-14 RX ADMIN — PIPERACILLIN SODIUM AND TAZOBACTAM SODIUM 3.38 G: 36; 4.5 INJECTION, POWDER, FOR SOLUTION INTRAVENOUS at 10:25

## 2017-09-14 RX ADMIN — LORAZEPAM 1 MG: 1 TABLET ORAL at 05:00

## 2017-09-14 RX ADMIN — ENOXAPARIN SODIUM 40 MG: 40 INJECTION SUBCUTANEOUS at 09:45

## 2017-09-14 RX ADMIN — LORAZEPAM 1 MG: 1 TABLET ORAL at 13:06

## 2017-09-14 RX ADMIN — PANTOPRAZOLE SODIUM 40 MG: 40 INJECTION, POWDER, FOR SOLUTION INTRAVENOUS at 09:46

## 2017-09-14 NOTE — PROGRESS NOTES
Progress Note - Jose Mann 38 y o  female MRN: [id number]    [facility] Encounter: [id number]    ASSESMENT/ PLAN:   1  RUQ pain: continues to experience RUQ post-prandial pain  EGD yesterday normal  MRI revealed CBD dilation of 1 3cm and filling defect within cystic duct stump  The dilation of CBD could be secondary to distal stricture, will evaluate with ERCP tomorrow  This could also be secondary to sphincter of ODDI dysfunction  S/p ERCP x 2 w/ sphincterotomy in the past    -NPO after midnight  -ERCP with possible sphincterotomy tomorrow  -discussed with primary team     2  IBD: Takes prednisone daily  Last scope in 2016 poor prep  CT showing sigmoid & rectal colitis  Colonoscopy yesterday was normal  -follow up bx  -outpatient follow up    Subjective:     Patient seen and examined  Continues to c/o RUQ post-prandial pain    Objective:     Vitals: Blood pressure 122/71, pulse (!) 52, temperature 98 7 °F (37 1 °C), temperature source Oral, resp  rate 14, height 5' 5" (1 651 m), weight 63 5 kg (140 lb), SpO2 98 %, not currently breastfeeding  ,Body mass index is 23 3 kg/m²        Intake/Output Summary (Last 24 hours) at 09/14/17 1401  Last data filed at 09/14/17 1306   Gross per 24 hour   Intake             3112 ml   Output             1800 ml   Net             1312 ml       Physical Exam:     General Appearance: Alert, appears stated age and cooperative  Lungs: Clear to auscultation bilaterally, no rales or rhonchi, no labored breathing/accessory muscle use  Heart: Regular rate and rhythm, S1, S2 normal, no murmur, click, rub or gallop  Abdomen: Soft, RUQ tenderness, non-distended; bowel sounds normal; no masses or no organomegaly  Extremities: No cyanosis, edema    Invasive Devices     Peripheral Intravenous Line            Peripheral IV 09/13/17 Left Arm 1 day                Lab Results:      Results from last 7 days  Lab Units 09/14/17  1136 09/11/17  0550  09/09/17  1910   WBC Thousand/uL  -- 10 52*  < > 11 63*   HEMOGLOBIN g/dL 13 2 14 1  < > 14 1   HEMATOCRIT % 40 4 42 3  < > 42 4   PLATELETS Thousands/uL  --  426*  < > 435*   LYMPHO PCT %  --   --   --  3*   MONO PCT MAN %  --   --   --  3*   EOSINO PCT MANUAL %  --   --   --  0   < > = values in this interval not displayed  Results from last 7 days  Lab Units 09/11/17  0550 09/10/17  0531   SODIUM mmol/L 138 140   POTASSIUM mmol/L 4 1 3 6   CHLORIDE mmol/L 104 107   CO2 mmol/L 26 27   BUN mg/dL 13 13   CREATININE mg/dL 0 70 0 71   CALCIUM mg/dL 8 6 8 0*   TOTAL PROTEIN g/dL  --  6 0*   BILIRUBIN TOTAL mg/dL  --  0 76   ALK PHOS U/L  --  45*   ALT U/L  --  26   AST U/L  --  10   GLUCOSE RANDOM mg/dL 80 82           Results from last 7 days  Lab Units 09/09/17  1910   LIPASE u/L 176       Imaging Studies: I have personally reviewed pertinent imaging studies  Mri Abdomen Wo Contrast And Mrcp    Result Date: 9/13/2017  Impression: 1  Status post cholecystectomy with unchanged central intrahepatic and common bile duct dilatation  Common bile duct measures up to 1 3 cm  2   Filling defect seen within the cystic duct stump, stones versus sludge  No filling defects seen within the common bile duct

## 2017-09-14 NOTE — MEDICAL STUDENT
Progress Note - Madie Muniz 38 y o  female MRN: [id number]    [facility] Encounter: [id number]      Assessment:  38 y o  F w/ a hx of cholecystectomy and sphincter of Oddi dysfunction w/ multiple ERCPs and sten placement [facility] for worsening RUQ abdominal pain    Plan:  1  RUQ abdominal pain w/ sphincter of Oddi dysfunction   · MRI abdomen wo contrast and MRCP (9/13/17): status post cholecystectomy with unchanged central intrahepatic and common bile duct dilatation  Common bile duct measures up to 1 3 cm  Filling defect seen within the cystic duct stump, stones versus sludge  No filling defects seen within the common bile duct     · Follow GI's recommendations   · NPO after midnight w/ ERCP w/ possible sphincterotomy tomorrow  · Inpatient pain management following  · Continue Tramadol 50mg PO q6h for severe pain, Demerol 50mg IV q4h prn for breakthrough pain  2  Ulcerative colitis  · Continue to follow GI's recommendations  · Colonoscopy (9/12/17): normal terminal ileum  Normal entire colon  Multiple biopsies taken  F/U bx  · Outpatient F/U w/ GI  3  Febrile illness  · Patient has remained afebrile over last 24hr  · On Zosyn 3 375g  4  Hypothyroidism  · Continue levothyroxine 75mcg and liothyronine 5mcg  5  Migrainous HAs w/ aura   · Consider inpatient neurology consult if symptoms recur during hospitalization; otherwise outpatient F/U w/ neurology     Subjective:   Pain is now better controlled  Was experiencing 10/10 RUQ abd pain that is now 4/10 s/p Tramadol and Demerol  Says that eating makes this pain worse, but was able to tolerate a banana and cheerios for breakfast   Is still experiencing nausea 2/2 to pain, but has not vomited, and says that Zofran prn provides relief  +F and dark red bloody BM this morning, but reports that BMs are less bloody than they have been       Patient is also concerned about frequent blurry vision associated with HAs that she has been experiencing during the past 3 weeks; most recent episode was on Monday (3d ago)  She has a remote history of migraines, for which she used to take IM sumatriptan, but recently had a patient-reported anaphylactic rxn to this during last hospitalization  In addition to the blurry vision, patient reports that she she's floaters/lights in her R eye in the morning and sometimes experiences subsequent blackening of her entire field of vision on this side  These episodes normally occur when she wakes up in the morning and are associated w/ subsequent blurry vision, HA, and nausea  Denies rhinorrhea, diplopia, vomiting, lightheadedness, syncope, chest pain, and palpitations with these episodes  She has not tried taking anything for relief since her reaction to sumatriptan  Is concerned that these symptoms have been persistent recently  Objective:     Vitals: Blood pressure 122/71, pulse (!) 52, temperature 98 2 °F (36 8 °C), temperature source Oral, resp  rate 14, height 5' 5" (1 651 m), weight 63 5 kg (140 lb), SpO2 98 %, not currently breastfeeding  ,Body mass index is 23 3 kg/m²        Intake/Output Summary (Last 24 hours) at 09/14/17 1127  Last data filed at 09/14/17 0900   Gross per 24 hour   Intake             2992 ml   Output             1800 ml   Net             1192 ml     Physical Exam: /71   Pulse (!) 52   Temp 98 7 °F (37 1 °C) (Oral) Comment: pt requested temp be taken  Resp 14   Ht 5' 5" (1 651 m)   Wt 63 5 kg (140 lb)   SpO2 98%   BMI 23 30 kg/m²   General appearance: alert, appears stated age, cooperative and no distress  Head: Normocephalic, without obvious abnormality, atraumatic  Eyes: PERRL, EOMI  Lungs: clear to auscultation bilaterally  Heart: regular rate and rhythm, S1, S2 normal, no murmur, click, rub or gallop  Abdomen: soft, non-distended, old surgical scars present and well-healed, tender to palpation in RUQ, -Mejia's sign, no guarding or rebound  Extremities: extremities normal, atraumatic, no cyanosis or edema  Skin: Skin color, texture, turgor normal  No rashes or lesions     Invasive Devices     Peripheral Intravenous Line            Peripheral IV 09/13/17 Left Arm 1 day              Lab, Imaging and other studies: I have personally reviewed pertinent reports  Lab Results   Component Value Date    WBC 10 52 (H) 09/11/2017    HGB 14 1 09/11/2017    HCT 42 3 09/11/2017    MCV 96 09/11/2017     (H) 09/11/2017     Lab Results   Component Value Date    GLUCOSE 80 09/11/2017    CALCIUM 8 6 09/11/2017     09/11/2017    K 4 1 09/11/2017    CO2 26 09/11/2017     09/11/2017    BUN 13 09/11/2017    CREATININE 0 70 09/11/2017     -9/13/17 MRI abdomen wo contrast and MRCP: status post cholecystectomy with unchanged central intrahepatic and common bile duct dilatation  Common bile duct measures up to 1 3 cm  Filling defect seen within the cystic duct stump, stones versus sludge  No filling defects seen within the common bile duct     -9/12/17 Colonoscopy: normal terminal ileum  Normal entire colon  Multiple biopsies taken      VTE Pharmacologic Prophylaxis: Enoxaparin (Lovenox)  VTE Mechanical Prophylaxis: sequential compression device

## 2017-09-14 NOTE — PROGRESS NOTES
[facility] Progress Note  Patient: Michel Johnson 38 y o  female   MRN: [id number]  PCP: Jessi Barillas PA-C  Unit/Bed#: [facility] 823-01 Encounter: [id number]  Date Of Visit: 17    Assessment:    Principal Problem:    Abdominal pain  Active Problems:    Colitis    Generalized anxiety disorder    Acquired hypothyroidism    Abnormal CT of the abdomen    Leukocytosis    Thrombophlebitis    RUQ pain      Plan:    · Right upper quadrant abdominal pain with sphincter of Oddi dysfunction with associated generalized pain  Patient being followed by [facility]   GI to follow up regarding possible ERCP versus surgical intervention  Note MRCP  · Inflammatory bowel disease  GI following  · Febrile illness-continue monitor    VTE Pharmacologic Prophylaxis:   Pharmacologic: Enoxaparin (Lovenox)  Mechanical VTE Prophylaxis in Place: Yes    Patient Centered Rounds: I have performed bedside rounds with nursing staff today  Time Spent for Care: 15 minutes  More than 50% of total time spent on counseling and coordination of care as described above  Current Length of Stay: 4 day(s)    Current Patient Status: Inpatient   Certification Statement: The patient will continue to require additional inpatient hospital stay due to Need to monitor symptoms        Code Status: Level 1 - Full Code      Subjective:   Patient comfortable    Objective:     Vitals:   Temp (24hrs), Av 5 °F (36 9 °C), Min:98 2 °F (36 8 °C), Max:98 7 °F (37 1 °C)    HR:  [52-69] 52  Resp:  [14-18] 14  BP: (116-122)/(65-74) 122/71  SpO2:  [96 %-98 %] 98 %  Body mass index is 23 3 kg/m²  Input and Output Summary (last 24 hours):        Intake/Output Summary (Last 24 hours) at 17 1319  Last data filed at 17 0900   Gross per 24 hour   Intake             2992 ml   Output             1800 ml   Net             1192 ml       Physical Exam:     Physical Exam   Constitutional: She is oriented to person, place, and time  She appears well-developed and well-nourished  HENT:   Head: Normocephalic and atraumatic  Eyes: Conjunctivae are normal  Pupils are equal, round, and reactive to light  Neck: Normal range of motion  Neck supple  No JVD present  No tracheal deviation present  No thyromegaly present  Cardiovascular: Normal rate and regular rhythm  No murmur heard  Pulmonary/Chest: Breath sounds normal  No stridor  No respiratory distress  She has no wheezes  She has no rales  Abdominal: Soft  Bowel sounds are normal  She exhibits no distension  There is no tenderness  There is no rebound  Musculoskeletal: She exhibits no edema or deformity  Neurological: She is alert and oriented to person, place, and time  Skin: Skin is dry  No rash noted  No erythema  Psychiatric: She has a normal mood and affect  Additional Data:     Labs:      Results from last 7 days  Lab Units 09/14/17  1136 09/11/17  0550  09/09/17  1910   WBC Thousand/uL  --  10 52*  < > 11 63*   HEMOGLOBIN g/dL 13 2 14 1  < > 14 1   HEMATOCRIT % 40 4 42 3  < > 42 4   PLATELETS Thousands/uL  --  426*  < > 435*   LYMPHO PCT %  --   --   --  3*   MONO PCT MAN %  --   --   --  3*   EOSINO PCT MANUAL %  --   --   --  0   < > = values in this interval not displayed  Results from last 7 days  Lab Units 09/11/17  0550 09/10/17  0531   SODIUM mmol/L 138 140   POTASSIUM mmol/L 4 1 3 6   CHLORIDE mmol/L 104 107   CO2 mmol/L 26 27   BUN mg/dL 13 13   CREATININE mg/dL 0 70 0 71   CALCIUM mg/dL 8 6 8 0*   TOTAL PROTEIN g/dL  --  6 0*   BILIRUBIN TOTAL mg/dL  --  0 76   ALK PHOS U/L  --  45*   ALT U/L  --  26   AST U/L  --  10   GLUCOSE RANDOM mg/dL 80 82           * I Have Reviewed All Lab Data Listed Above  * Additional Pertinent Lab Tests Reviewed:  All Labs Within Last 24 Hours Reviewed      Recent Cultures (last 7 days):       Results from last 7 days  Lab Units 09/11/17  2300 09/11/17  1122 09/09/17  1935 09/09/17  1934   BLOOD CULTURE   -- No Growth at 72 hrs  No Growth at 72 hrs  No Growth at 72 hrs  C DIFF TOXIN B  NEGATIVE for C difficle toxin by PCR    --   --   --        Last 24 Hours Medication List:     aspirin 325 mg Oral Daily   enoxaparin 40 mg Subcutaneous Daily   levothyroxine 75 mcg Oral Early Morning   liothyronine 5 mcg Oral Early Morning   pantoprazole 40 mg Intravenous Q12H [facility] & assisted   piperacillin-tazobactam 3 375 g Intravenous Q6H        Today, Patient Was Seen By: Gabby Freed DO    ** Please Note: This note has been constructed using a voice recognition system   **

## 2017-09-15 ENCOUNTER — ANESTHESIA EVENT (INPATIENT)
Dept: GASTROENTEROLOGY | Facility: HOSPITAL | Age: 42
DRG: 392 | End: 2017-09-15
Payer: COMMERCIAL

## 2017-09-15 ENCOUNTER — APPOINTMENT (INPATIENT)
Dept: RADIOLOGY | Facility: HOSPITAL | Age: 42
DRG: 392 | End: 2017-09-15
Payer: COMMERCIAL

## 2017-09-15 ENCOUNTER — ANESTHESIA (INPATIENT)
Dept: GASTROENTEROLOGY | Facility: HOSPITAL | Age: 42
DRG: 392 | End: 2017-09-15
Payer: COMMERCIAL

## 2017-09-15 LAB
BACTERIA BLD CULT: NORMAL
BACTERIA BLD CULT: NORMAL

## 2017-09-15 PROCEDURE — C9113 INJ PANTOPRAZOLE SODIUM, VIA: HCPCS | Performed by: HOSPITALIST

## 2017-09-15 PROCEDURE — 0F798ZZ DILATION OF COMMON BILE DUCT, VIA NATURAL OR ARTIFICIAL OPENING ENDOSCOPIC: ICD-10-PCS | Performed by: INTERNAL MEDICINE

## 2017-09-15 PROCEDURE — C1769 GUIDE WIRE: HCPCS | Performed by: INTERNAL MEDICINE

## 2017-09-15 PROCEDURE — 74328 X-RAY BILE DUCT ENDOSCOPY: CPT

## 2017-09-15 RX ORDER — SODIUM CHLORIDE 9 MG/ML
INJECTION, SOLUTION INTRAVENOUS CONTINUOUS PRN
Status: DISCONTINUED | OUTPATIENT
Start: 2017-09-15 | End: 2017-09-15 | Stop reason: SURG

## 2017-09-15 RX ORDER — DIPHENHYDRAMINE HYDROCHLORIDE 50 MG/ML
INJECTION INTRAMUSCULAR; INTRAVENOUS AS NEEDED
Status: DISCONTINUED | OUTPATIENT
Start: 2017-09-15 | End: 2017-09-15 | Stop reason: SURG

## 2017-09-15 RX ORDER — METHYLPREDNISOLONE SODIUM SUCCINATE 125 MG/2ML
INJECTION, POWDER, LYOPHILIZED, FOR SOLUTION INTRAMUSCULAR; INTRAVENOUS AS NEEDED
Status: DISCONTINUED | OUTPATIENT
Start: 2017-09-15 | End: 2017-09-15 | Stop reason: SURG

## 2017-09-15 RX ORDER — PROPOFOL 10 MG/ML
INJECTION, EMULSION INTRAVENOUS CONTINUOUS PRN
Status: DISCONTINUED | OUTPATIENT
Start: 2017-09-15 | End: 2017-09-15 | Stop reason: SURG

## 2017-09-15 RX ORDER — PROPOFOL 10 MG/ML
INJECTION, EMULSION INTRAVENOUS AS NEEDED
Status: DISCONTINUED | OUTPATIENT
Start: 2017-09-15 | End: 2017-09-15 | Stop reason: SURG

## 2017-09-15 RX ADMIN — MEPERIDINE HYDROCHLORIDE 50 MG: 50 INJECTION, SOLUTION INTRAMUSCULAR; INTRAVENOUS; SUBCUTANEOUS at 01:21

## 2017-09-15 RX ADMIN — ONDANSETRON 4 MG: 2 INJECTION INTRAMUSCULAR; INTRAVENOUS at 13:40

## 2017-09-15 RX ADMIN — PROPOFOL 250 MG: 10 INJECTION, EMULSION INTRAVENOUS at 15:21

## 2017-09-15 RX ADMIN — DIPHENHYDRAMINE HYDROCHLORIDE 50 MG: 50 INJECTION, SOLUTION INTRAMUSCULAR; INTRAVENOUS at 15:34

## 2017-09-15 RX ADMIN — LORAZEPAM 1 MG: 1 TABLET ORAL at 22:03

## 2017-09-15 RX ADMIN — IOHEXOL 6 ML: 240 INJECTION, SOLUTION INTRATHECAL; INTRAVASCULAR; INTRAVENOUS; ORAL at 15:40

## 2017-09-15 RX ADMIN — MEPERIDINE HYDROCHLORIDE 50 MG: 50 INJECTION, SOLUTION INTRAMUSCULAR; INTRAVENOUS; SUBCUTANEOUS at 17:57

## 2017-09-15 RX ADMIN — PROPOFOL 100 MG: 10 INJECTION, EMULSION INTRAVENOUS at 15:38

## 2017-09-15 RX ADMIN — DIPHENHYDRAMINE HYDROCHLORIDE 25 MG: 50 INJECTION, SOLUTION INTRAMUSCULAR; INTRAVENOUS at 12:23

## 2017-09-15 RX ADMIN — PIPERACILLIN SODIUM AND TAZOBACTAM SODIUM 3.38 G: 36; 4.5 INJECTION, POWDER, FOR SOLUTION INTRAVENOUS at 17:57

## 2017-09-15 RX ADMIN — ENOXAPARIN SODIUM 40 MG: 40 INJECTION SUBCUTANEOUS at 09:21

## 2017-09-15 RX ADMIN — METHYLPREDNISOLONE SODIUM SUCCINATE 62.5 MG: 125 INJECTION, POWDER, FOR SOLUTION INTRAMUSCULAR; INTRAVENOUS at 15:05

## 2017-09-15 RX ADMIN — MEPERIDINE HYDROCHLORIDE 50 MG: 50 INJECTION, SOLUTION INTRAMUSCULAR; INTRAVENOUS; SUBCUTANEOUS at 13:39

## 2017-09-15 RX ADMIN — DIPHENHYDRAMINE HYDROCHLORIDE 25 MG: 50 INJECTION, SOLUTION INTRAMUSCULAR; INTRAVENOUS at 22:04

## 2017-09-15 RX ADMIN — LORAZEPAM 1 MG: 1 TABLET ORAL at 09:28

## 2017-09-15 RX ADMIN — PROPOFOL 120 MCG/KG/MIN: 10 INJECTION, EMULSION INTRAVENOUS at 15:23

## 2017-09-15 RX ADMIN — MEPERIDINE HYDROCHLORIDE 50 MG: 50 INJECTION, SOLUTION INTRAMUSCULAR; INTRAVENOUS; SUBCUTANEOUS at 09:20

## 2017-09-15 RX ADMIN — MEPERIDINE HYDROCHLORIDE 50 MG: 50 INJECTION, SOLUTION INTRAMUSCULAR; INTRAVENOUS; SUBCUTANEOUS at 05:38

## 2017-09-15 RX ADMIN — LORAZEPAM 1 MG: 1 TABLET ORAL at 17:57

## 2017-09-15 RX ADMIN — SODIUM CHLORIDE: 0.9 INJECTION, SOLUTION INTRAVENOUS at 15:12

## 2017-09-15 RX ADMIN — PROPOFOL 50 MG: 10 INJECTION, EMULSION INTRAVENOUS at 15:44

## 2017-09-15 RX ADMIN — PROPOFOL 50 MG: 10 INJECTION, EMULSION INTRAVENOUS at 15:49

## 2017-09-15 RX ADMIN — PANTOPRAZOLE SODIUM 40 MG: 40 INJECTION, POWDER, FOR SOLUTION INTRAVENOUS at 22:04

## 2017-09-15 RX ADMIN — LORAZEPAM 1 MG: 1 TABLET ORAL at 01:21

## 2017-09-15 RX ADMIN — PIPERACILLIN SODIUM AND TAZOBACTAM SODIUM 3.38 G: 36; 4.5 INJECTION, POWDER, FOR SOLUTION INTRAVENOUS at 23:42

## 2017-09-15 RX ADMIN — PROPOFOL 100 MG: 10 INJECTION, EMULSION INTRAVENOUS at 15:30

## 2017-09-15 RX ADMIN — MEPERIDINE HYDROCHLORIDE 50 MG: 50 INJECTION, SOLUTION INTRAMUSCULAR; INTRAVENOUS; SUBCUTANEOUS at 22:04

## 2017-09-15 RX ADMIN — DIPHENHYDRAMINE HYDROCHLORIDE 25 MG: 50 INJECTION, SOLUTION INTRAMUSCULAR; INTRAVENOUS at 05:33

## 2017-09-15 RX ADMIN — PIPERACILLIN SODIUM AND TAZOBACTAM SODIUM 3.38 G: 36; 4.5 INJECTION, POWDER, FOR SOLUTION INTRAVENOUS at 11:00

## 2017-09-15 RX ADMIN — SODIUM CHLORIDE 80 ML/HR: 0.9 INJECTION, SOLUTION INTRAVENOUS at 05:38

## 2017-09-15 RX ADMIN — PIPERACILLIN SODIUM AND TAZOBACTAM SODIUM 3.38 G: 36; 4.5 INJECTION, POWDER, FOR SOLUTION INTRAVENOUS at 05:33

## 2017-09-15 NOTE — PROGRESS NOTES
Patient was seen and examined in the GI lab  Her radiological images and blood work were reviewed  No LFT abnormalities and no lipase elevation however MRCP shows dilated bile duct with distal narrowing  It is possible that she will have stricture down her previous sphincterotomy site which was initially done in 2015  She had to have a repeat ERCP done last year as well which was done at [facility]  At this time he had a discussion regarding repeating the ERCP with extension of the sphincterotomy  I discussed the procedure old risks and complications including bleeding, perforation and pancreatitis  She is agreeable to proceed

## 2017-09-15 NOTE — OP NOTE
**** GI/ENDOSCOPY REPORT ****     PATIENT NAME: Yuliana EPPERSON - VISIT ID:  Patient ID: [id number] YOB: 1975     INTRODUCTION: Endoscopic Retrograde Cholangiopancreatography - A 38  year-old female patient presents for an inpatient Endoscopic Retrograde   Cholangiopancreatography at [address] [place] [address]  INDICATIONS: Bile duct dilation  CONSENT:  The benefits, risks, and alternatives to the procedure were   discussed and informed consent was obtained from the patient  PREPARATION:  EKG, pulse, pulse oximetry and blood pressure were monitored   throughout the procedure  ASA Classification: Class 3 - Patient has severe   systemic disturbance that may or may not be related to the disorder   requiring surgery  MEDICATIONS: Anesthesia-check records     PROCEDURE:  The endoscope was passed with ease through the mouth under   direct visualization and advanced to the duodenum  The scope was withdrawn   and the mucosa was carefully examined  FINDINGS:  The endoscope was passed to the ampulla  The ampulla was post   sphincterotomy  Both the biliary and pancreatic orifice were draining  The   bile duct was cannulated and the wire extended to the intrhepatic ducts  Balloon sweeps were done and the 12mm balloon was able to traverse the   opening with only mild resistance  Contrast was injected and duct dilation   was seen to 13-14mm with intrahepatic duct dilation as well  No stricture   was seen  There was prompt drainage of the contrast  The procedure was   terminated  COMPLICATIONS: There were no complications  IMPRESSIONS:  Dilated bile duct with prior sphincterotomy which was open   without evidence of stenosis or stricture  No further sphincterotomy   extension was done at this time  RECOMMENDATIONS:  Follow clinically and treatment for function abdominal   pain       ESTIMATED BLOOD LOSS:     PROCEDURE CODES:     ICD-9 Codes:     ICD-10 Codes: PERFORMED BY: Dr Doris Sandhoff, M D  on 09/15/2017  Version 1, electronically signed by Dr Doris Sandhoff, M D  on 09/15/2017 at   16:17

## 2017-09-15 NOTE — CASE MANAGEMENT
[address] [place] [address] in the [facility] by Carlos Alberto Conner for 2017  Network Utilization Review Department  Phone: [phone]; Fax [phone]  ATTENTION: The Network Utilization Review Department is now centralized for our 7 Facilities  Make a note that we have a new phone and fax numbers for our Department  Please call with any questions or concerns to [phone] and carefully follow the prompts so that you are directed to the right person  All voicemails are confidential  Fax any determinations, approvals, denials, and requests for initial or continue stay review clinical to [phone]  Due to HIGH CALL volume, it would be easier if you could please send faxed requests to expedite your requests and in part, help us provide discharge notifications faster        Continued Stay Review    Date:  9/15/17 Friday ACUTE MED SURG LEVEL OF CARE    Vital Signs: /71   Pulse 67   Temp 97 6 °F (36 4 °C) (Oral)   Resp 18   Ht 5' 5" (1 651 m)   Wt 63 5 kg (140 lb)   SpO2 99%   BMI 23 30 kg/m²     Temp (24hrs), Av 5 °F (36 9 °C), Min:98 2 °F (36 8 °C), Max:98 7 °F (37 1 °C)   HR:  [52-69] 52  Resp:  [14-18] 14  BP: (116-122)/(65-74) 122/71  SpO2:  [96 %-98 %] 98 %  Body mass index is 23 3 kg/m²       Input and Output Summary (last 24 hours):   Last data filed at 17 0900    Gross per 24 hour   Intake             2992 ml   Output             1800 ml   Net             1192 ml     NPO    Continuous IV Infusions:   sodium chloride 80 mL/hr Last Rate: 80 mL/hr (09/15/17 0538)       Medications:   Scheduled Meds:   aspirin 325 mg Oral Daily   enoxaparin 40 mg Subcutaneous Daily   levothyroxine 75 mcg Oral Early Morning   liothyronine 5 mcg Oral Early Morning   pantoprazole 40 mg Intravenous Q12H [facility] correction   piperacillin-tazobactam 3 375 g Intravenous Q6H     PRN Meds:      acetaminophen    IV diphenhydrAMINE 25 mg q8hrs prn given x 3/ 2 4 hrs    hyoscyamine    IV LORazepam 1mg q6hrs prn given x 4/ 24 hrs    IV meperidine 50 mg q4hrs prn given x 6/ 24 hrs    Iv ondansetron 4mg q6hrs prn given x 2/ 24 hrs    traMADol    LABS/Diagnostic Results:   CBC  Results from last 7 days  Lab Units 09/14/17  1136 09/11/17  0550   09/09/17  1910   WBC Thousand/uL  --  10 52*  < > 11 63*   HEMOGLOBIN g/dL 13 2 14 1  < > 14 1   HEMATOCRIT % 40 4 42 3  < > 42 4   PLATELETS Thousands/uL  --  426*  < > 435*   LYMPHO PCT %  --   --   --  3*   MONO PCT MAN %  --   --   --  3*   EOSINO PCT MANUAL %  --   --   --  0   < > = values in this interval not displayed      CMP  Results from last 7 days  Lab Units 09/11/17  0550 09/10/17  0531   SODIUM mmol/L 138 140   POTASSIUM mmol/L 4 1 3 6   CHLORIDE mmol/L 104 107   CO2 mmol/L 26 27   BUN mg/dL 13 13   CREATININE mg/dL 0 70 0 71   CALCIUM mg/dL 8 6 8 0*   TOTAL PROTEIN g/dL  --  6 0*   BILIRUBIN TOTAL mg/dL  --  0 76   ALK PHOS U/L  --  45*   ALT U/L  --  26   AST U/L  --  10   GLUCOSE RANDOM mg/dL 80 82     Microbiology Results from last 7 days  Lab Units 09/11/17  2300 09/11/17  1122 09/09/17  1935 09/09/17  1934   BLOOD CULTURE    --  No Growth at 72 hrs  No Growth at 72 hrs  No Growth at 72 hrs  C DIFF TOXIN B   NEGATIVE for C difficle toxin by PCR    --   --   --          Age/Sex: 38 y o  female     Assessment/Plan:    Assessment:   Principal Problem:    Abdominal pain  Active Problems:    Colitis    Generalized anxiety disorder    Acquired hypothyroidism    Abnormal CT of the abdomen    Leukocytosis    Thrombophlebitis    RUQ pain      Plan:   · Right upper quadrant abdominal pain with sphincter of Oddi dysfunction with associated generalized pain  Patient being followed by [facility]   GI to follow up regarding possible ERCP versus surgical intervention  Note MRCP  · Inflammatory bowel disease  GI following    · Febrile illness-continue monitor     VTE Pharmacologic Prophylaxis:   Pharmacologic: Enoxaparin (Lovenox)  Mechanical VTE Prophylaxis in Place:  Yes      Current Length of Stay: 4 day(s)     Current Patient Status: Inpatient   Certification Statement: The patient will continue to require additional inpatient hospital stay         Discharge Plan:   [address] CLEARED    CASE MANAGEMENT FOLLOWING CLOSELY FOR ALL DISCHARGE NEEDS

## 2017-09-15 NOTE — PROGRESS NOTES
[facility] Progress Note  Patient: Maribel Hewitt 38 y o  female   MRN: [id number]  PCP: Renato Kanner, PA-C  Unit/Bed#: [facility] 823-01 Encounter: [id number]  Date Of Visit: 09/15/17    Assessment:    Principal Problem:    Abdominal pain  Active Problems:    Colitis    Generalized anxiety disorder    Acquired hypothyroidism    Abnormal CT of the abdomen    Leukocytosis    Thrombophlebitis    RUQ pain      Plan:    · Right upper quadrant abdominal pain with sphincter OG dysfunction with associated generalized pain   Patient followed by acute Pain Service  Continue monitor symptoms  ERCP as per Gastroenterology  · Inflammatory bowel disease-GI following  · Febrile illness-continue to monitor  VTE Pharmacologic Prophylaxis:   Pharmacologic: Enoxaparin (Lovenox)  Mechanical VTE Prophylaxis in Place: Yes    Patient Centered Rounds: I have performed bedside rounds with nursing staff today  Time Spent for Care: 15 minutes  More than 50% of total time spent on counseling and coordination of care as described above  Current Length of Stay: 5 day(s)    Current Patient Status: Inpatient   Certification Statement: The patient will continue to require additional inpatient hospital stay due to Need to monitor symptoms    Discharge Plan / Estimated Discharge Date:  ERCP today    Code Status: Level 1 - Full Code      Subjective: The patient c comfortable    Objective:     Vitals:   Temp (24hrs), Av 2 °F (36 8 °C), Min:97 6 °F (36 4 °C), Max:98 7 °F (37 1 °C)    HR:  [67-82] 67  Resp:  [14-18] 18  BP: ()/(58-74) 106/71  SpO2:  [95 %-99 %] 99 %  Body mass index is 23 3 kg/m²  Input and Output Summary (last 24 hours):        Intake/Output Summary (Last 24 hours) at 09/15/17 0924  Last data filed at 09/15/17 0900   Gross per 24 hour   Intake          2034 67 ml   Output             2400 ml   Net          -365 33 ml       Physical Exam:     Physical Exam   Constitutional: She is oriented to person, place, and time  She appears well-developed and well-nourished  HENT:   Head: Normocephalic and atraumatic  Eyes: Conjunctivae are normal  Pupils are equal, round, and reactive to light  Neck: Normal range of motion  Neck supple  No JVD present  No tracheal deviation present  No thyromegaly present  Pulmonary/Chest: No stridor  Abdominal: Soft  There is tenderness  Musculoskeletal: She exhibits no edema  Neurological: She is alert and oriented to person, place, and time  Skin: Skin is warm and dry  Psychiatric: She has a normal mood and affect  Additional Data:     Labs:      Results from last 7 days  Lab Units 09/14/17  1136 09/11/17  0550  09/09/17  1910   WBC Thousand/uL  --  10 52*  < > 11 63*   HEMOGLOBIN g/dL 13 2 14 1  < > 14 1   HEMATOCRIT % 40 4 42 3  < > 42 4   PLATELETS Thousands/uL  --  426*  < > 435*   LYMPHO PCT %  --   --   --  3*   MONO PCT MAN %  --   --   --  3*   EOSINO PCT MANUAL %  --   --   --  0   < > = values in this interval not displayed  Results from last 7 days  Lab Units 09/11/17  0550 09/10/17  0531   SODIUM mmol/L 138 140   POTASSIUM mmol/L 4 1 3 6   CHLORIDE mmol/L 104 107   CO2 mmol/L 26 27   BUN mg/dL 13 13   CREATININE mg/dL 0 70 0 71   CALCIUM mg/dL 8 6 8 0*   TOTAL PROTEIN g/dL  --  6 0*   BILIRUBIN TOTAL mg/dL  --  0 76   ALK PHOS U/L  --  45*   ALT U/L  --  26   AST U/L  --  10   GLUCOSE RANDOM mg/dL 80 82           * I Have Reviewed All Lab Data Listed Above  * Additional Pertinent Lab Tests Reviewed: All Labs Within Last 24 Hours Reviewed        Recent Cultures (last 7 days):       Results from last 7 days  Lab Units 09/11/17  2300 09/11/17  1122 09/09/17  1935 09/09/17  1934   BLOOD CULTURE   --  No Growth at 72 hrs  No Growth After 4 Days  No Growth After 4 Days     C DIFF TOXIN B  NEGATIVE for C difficle toxin by PCR    --   --   --        Last 24 Hours Medication List:     aspirin 325 mg Oral Daily   enoxaparin 40 mg Subcutaneous Daily   levothyroxine 75 mcg Oral Early Morning   liothyronine 5 mcg Oral Early Morning   pantoprazole 40 mg Intravenous Q12H [address]   piperacillin-tazobactam 3 375 g Intravenous Q6H        Today, Patient Was Seen By: Nathen Herrera DO    ** Please Note: This note has been constructed using a voice recognition system   **

## 2017-09-16 LAB — BACTERIA BLD CULT: NORMAL

## 2017-09-16 PROCEDURE — C9113 INJ PANTOPRAZOLE SODIUM, VIA: HCPCS | Performed by: HOSPITALIST

## 2017-09-16 RX ORDER — SACCHAROMYCES BOULARDII 250 MG
250 CAPSULE ORAL 2 TIMES DAILY
Status: DISCONTINUED | OUTPATIENT
Start: 2017-09-16 | End: 2017-09-29 | Stop reason: HOSPADM

## 2017-09-16 RX ORDER — DICYCLOMINE HCL 20 MG
20 TABLET ORAL
Status: DISCONTINUED | OUTPATIENT
Start: 2017-09-16 | End: 2017-09-19

## 2017-09-16 RX ADMIN — DIPHENHYDRAMINE HYDROCHLORIDE 25 MG: 50 INJECTION, SOLUTION INTRAMUSCULAR; INTRAVENOUS at 21:34

## 2017-09-16 RX ADMIN — MEPERIDINE HYDROCHLORIDE 50 MG: 50 INJECTION, SOLUTION INTRAMUSCULAR; INTRAVENOUS; SUBCUTANEOUS at 08:57

## 2017-09-16 RX ADMIN — PIPERACILLIN SODIUM AND TAZOBACTAM SODIUM 3.38 G: 36; 4.5 INJECTION, POWDER, FOR SOLUTION INTRAVENOUS at 10:58

## 2017-09-16 RX ADMIN — Medication 250 MG: at 20:00

## 2017-09-16 RX ADMIN — PIPERACILLIN SODIUM AND TAZOBACTAM SODIUM 3.38 G: 36; 4.5 INJECTION, POWDER, FOR SOLUTION INTRAVENOUS at 17:34

## 2017-09-16 RX ADMIN — TRAMADOL HYDROCHLORIDE 50 MG: 50 TABLET, COATED ORAL at 15:38

## 2017-09-16 RX ADMIN — ONDANSETRON 4 MG: 2 INJECTION INTRAMUSCULAR; INTRAVENOUS at 17:34

## 2017-09-16 RX ADMIN — RIFAXIMIN 550 MG: 550 TABLET ORAL at 21:42

## 2017-09-16 RX ADMIN — MEPERIDINE HYDROCHLORIDE 50 MG: 50 INJECTION, SOLUTION INTRAMUSCULAR; INTRAVENOUS; SUBCUTANEOUS at 17:34

## 2017-09-16 RX ADMIN — DICYCLOMINE HYDROCHLORIDE 20 MG: 20 TABLET ORAL at 21:42

## 2017-09-16 RX ADMIN — PANTOPRAZOLE SODIUM 40 MG: 40 INJECTION, POWDER, FOR SOLUTION INTRAVENOUS at 08:57

## 2017-09-16 RX ADMIN — MEPERIDINE HYDROCHLORIDE 50 MG: 50 INJECTION, SOLUTION INTRAMUSCULAR; INTRAVENOUS; SUBCUTANEOUS at 02:50

## 2017-09-16 RX ADMIN — LORAZEPAM 1 MG: 1 TABLET ORAL at 15:38

## 2017-09-16 RX ADMIN — LORAZEPAM 1 MG: 1 TABLET ORAL at 21:42

## 2017-09-16 RX ADMIN — LORAZEPAM 1 MG: 1 TABLET ORAL at 08:57

## 2017-09-16 RX ADMIN — ENOXAPARIN SODIUM 40 MG: 40 INJECTION SUBCUTANEOUS at 08:57

## 2017-09-16 RX ADMIN — LIOTHYRONINE SODIUM 5 MCG: 5 TABLET ORAL at 05:50

## 2017-09-16 RX ADMIN — MEPERIDINE HYDROCHLORIDE 50 MG: 50 INJECTION, SOLUTION INTRAMUSCULAR; INTRAVENOUS; SUBCUTANEOUS at 21:34

## 2017-09-16 RX ADMIN — DIPHENHYDRAMINE HYDROCHLORIDE 25 MG: 50 INJECTION, SOLUTION INTRAMUSCULAR; INTRAVENOUS at 13:14

## 2017-09-16 RX ADMIN — PANTOPRAZOLE SODIUM 40 MG: 40 INJECTION, POWDER, FOR SOLUTION INTRAVENOUS at 20:01

## 2017-09-16 RX ADMIN — LEVOTHYROXINE SODIUM 75 MCG: 75 TABLET ORAL at 05:50

## 2017-09-16 RX ADMIN — MEPERIDINE HYDROCHLORIDE 50 MG: 50 INJECTION, SOLUTION INTRAMUSCULAR; INTRAVENOUS; SUBCUTANEOUS at 13:14

## 2017-09-16 RX ADMIN — TRAMADOL HYDROCHLORIDE 50 MG: 50 TABLET, COATED ORAL at 05:55

## 2017-09-16 RX ADMIN — ASPIRIN 325 MG: 325 TABLET ORAL at 08:57

## 2017-09-16 RX ADMIN — ONDANSETRON 4 MG: 2 INJECTION INTRAMUSCULAR; INTRAVENOUS at 09:01

## 2017-09-16 RX ADMIN — SODIUM CHLORIDE 80 ML/HR: 0.9 INJECTION, SOLUTION INTRAVENOUS at 02:50

## 2017-09-16 RX ADMIN — SODIUM CHLORIDE 80 ML/HR: 0.9 INJECTION, SOLUTION INTRAVENOUS at 17:35

## 2017-09-16 RX ADMIN — PIPERACILLIN SODIUM AND TAZOBACTAM SODIUM 3.38 G: 36; 4.5 INJECTION, POWDER, FOR SOLUTION INTRAVENOUS at 05:51

## 2017-09-16 NOTE — PROGRESS NOTES
Notified by pca pt is having pain and requesting pain meds; into give pt her pain meds, pt is asleep; pt had to be awakened; once awake, pt rates pain an "8 1/2-9"; pt given her oral tramadol as per request

## 2017-09-16 NOTE — PROGRESS NOTES
[facility] Progress Note  Patient: Manda Beavers 38 y o  female   MRN: [id number]  PCP: Pablo Saldaña PA-C  Unit/Bed#: [facility] 823-01 Encounter: [id number]  Date Of Visit: 17    Assessment:    Principal Problem:    Abdominal pain  Active Problems:    Colitis    Generalized anxiety disorder    Acquired hypothyroidism    Abnormal CT of the abdomen    Leukocytosis    Thrombophlebitis    RUQ pain      Plan:    · Right upper quadrant abdominal pain with sphincter of Oddi dysfunction with associated generalized pain  Continue with  · Advance diet and monitor pain  · Continue with supportive care  · Inflammatory bowel disease  · Febrile illness      VTE Pharmacologic Prophylaxis:   Pharmacologic: Enoxaparin (Lovenox)  Mechanical VTE Prophylaxis in Place: Yes    Patient Centered Rounds: I have performed bedside rounds with nursing staff today  Time Spent for Care: 15 minutes  More than 50% of total time spent on counseling and coordination of care as described above  Current Length of Stay: 6 day(s)    Current Patient Status: Inpatient   Certification Statement: The patient will continue to require additional inpatient hospital stay due to [facility] monitor symptoms    Discharge Plan / Estimated Discharge Date:  Need to monitor symptoms    Code Status: Level 1 - Full Code      Subjective:   Patient comfortable    Objective:     Vitals:   Temp (24hrs), Av 8 °F (36 6 °C), Min:96 5 °F (35 8 °C), Max:98 6 °F (37 °C)    HR:  [63-93] 70  Resp:  [16-18] 16  BP: ()/(63-82) 97/63  SpO2:  [94 %-100 %] 97 %  Body mass index is 22 79 kg/m²  Input and Output Summary (last 24 hours): Intake/Output Summary (Last 24 hours) at 17 0931  Last data filed at 17 0250   Gross per 24 hour   Intake             1400 ml   Output             1750 ml   Net             -350 ml       Physical Exam:     Physical Exam   Constitutional: She is oriented to person, place, and time   She appears well-developed and well-nourished  HENT:   Head: Normocephalic and atraumatic  Eyes: Conjunctivae are normal  Pupils are equal, round, and reactive to light  Neck: Normal range of motion  Neck supple  No JVD present  No tracheal deviation present  No thyromegaly present  Cardiovascular: Normal rate and regular rhythm  No murmur heard  Pulmonary/Chest: Effort normal and breath sounds normal  No stridor  No respiratory distress  She has no wheezes  She has no rales  Abdominal: Soft  Bowel sounds are normal  She exhibits no distension  There is no tenderness  There is no rebound  Musculoskeletal: Normal range of motion  She exhibits no edema or deformity  Neurological: She is alert and oriented to person, place, and time  No cranial nerve deficit  Coordination normal    Skin: Skin is warm and dry  No rash noted  No erythema  Psychiatric: She has a normal mood and affect  Additional Data:     Labs:      Results from last 7 days  Lab Units 09/14/17  1136 09/11/17  0550  09/09/17  1910   WBC Thousand/uL  --  10 52*  < > 11 63*   HEMOGLOBIN g/dL 13 2 14 1  < > 14 1   HEMATOCRIT % 40 4 42 3  < > 42 4   PLATELETS Thousands/uL  --  426*  < > 435*   LYMPHO PCT %  --   --   --  3*   MONO PCT MAN %  --   --   --  3*   EOSINO PCT MANUAL %  --   --   --  0   < > = values in this interval not displayed  Results from last 7 days  Lab Units 09/11/17  0550 09/10/17  0531   SODIUM mmol/L 138 140   POTASSIUM mmol/L 4 1 3 6   CHLORIDE mmol/L 104 107   CO2 mmol/L 26 27   BUN mg/dL 13 13   CREATININE mg/dL 0 70 0 71   CALCIUM mg/dL 8 6 8 0*   TOTAL PROTEIN g/dL  --  6 0*   BILIRUBIN TOTAL mg/dL  --  0 76   ALK PHOS U/L  --  45*   ALT U/L  --  26   AST U/L  --  10   GLUCOSE RANDOM mg/dL 80 82           * I Have Reviewed All Lab Data Listed Above  * Additional Pertinent Lab Tests Reviewed:  All Labs Within Last 24 Hours Reviewed        Recent Cultures (last 7 days):       Results from last 7 days  Lab Units 09/11/17  2300 09/11/17  1122 09/09/17  1935 09/09/17 1934   BLOOD CULTURE   --  No Growth After 4 Days  No Growth After 5 Days  No Growth After 5 Days  C DIFF TOXIN B  NEGATIVE for C difficle toxin by PCR    --   --   --        Last 24 Hours Medication List:     aspirin 325 mg Oral Daily   enoxaparin 40 mg Subcutaneous Daily   levothyroxine 75 mcg Oral Early Morning   liothyronine 5 mcg Oral Early Morning   pantoprazole 40 mg Intravenous Q12H [facility] long term   piperacillin-tazobactam 3 375 g Intravenous Q6H        Today, Patient Was Seen By: Nathen Herrera DO    ** Please Note: This note has been constructed using a voice recognition system   **

## 2017-09-16 NOTE — PROGRESS NOTES
Pt requesting iv pain meds and iv benedryl; resting comfortably in bed rating pain an 8; meds given as per pt request

## 2017-09-16 NOTE — PROGRESS NOTES
Pt requesting pain meds; resting comfortably in bed rating pain a 7; pt also requesting zofran and ativan; meds given as per pt request

## 2017-09-16 NOTE — PROGRESS NOTES
Into administer patient's antibiotic and ivf's; when entering room, patient sleeping and her alarm went off on her phone; her alarm was set for 530pm with the word "demerol" under it; she then proceeded to ask for her demerol and rated her pain a "7 1/2";and her nausea medication; when back into administer her demerol, pt was sleeping and had to be awakened to administer it; pt also stating that her tramadol is used "between the demerol doses";  dr Grupo Barbosa aware of situation

## 2017-09-16 NOTE — PROGRESS NOTES
Progress Note - Radha Maldonado 38 y o  female MRN: [id number]    Unit/Bed#: [facility] 823-01 Encounter: [id number]    Assessment and Plan:   Principal Problem:    Abdominal pain  Active Problems:    Colitis    Generalized anxiety disorder    Acquired hypothyroidism    Abnormal CT of the abdomen    Leukocytosis    Thrombophlebitis    RUQ pain    #1  Right upper quadrant abdominal pain with history of sphincter of Oddi dysfunction:  Patient is status post ERCP yesterday which showed an open previous sphincterotomy  Labs within normal limits  Colonoscopy negative for active colitis  -continue to supportively care symptomatically   -small meals  -diet as tolerated  ----------------------------------------------------------------------------------------------------------------    Subjective:     Patient continues to complain of right upper quadrant pain, especially after eating  She denies any nausea or vomiting  She denies any diarrhea or constipation  Objective:     Vitals: Blood pressure 97/63, pulse 70, temperature 98 °F (36 7 °C), temperature source Oral, resp  rate 16, height 5' 5 25" (1 657 m), weight 62 6 kg (138 lb), SpO2 97 %, not currently breastfeeding  ,Body mass index is 22 79 kg/m²        Intake/Output Summary (Last 24 hours) at 09/16/17 1250  Last data filed at 09/16/17 0900   Gross per 24 hour   Intake             1400 ml   Output             1750 ml   Net             -350 ml       Physical Exam:     General Appearance: Alert, appears stated age and cooperative  Lungs: Clear to auscultation bilaterally, no rales or rhonchi, no labored breathing/accessory muscle use  Heart: Regular rate and rhythm, S1, S2 normal, no murmur, click, rub or gallop  Abdomen: Soft, right upper quadrant tenderness upon palpation, non-distended; bowel sounds normal; no masses or no organomegaly  Extremities: No cyanosis, clubbing, or edema    Invasive Devices     Peripheral Intravenous Line            Peripheral IV 09/13/17 Left Arm 3 days                Lab Results:    Results from last 7 days  Lab Units 09/14/17  1136 09/11/17  0550  09/09/17  1910   WBC Thousand/uL  --  10 52*  < > 11 63*   HEMOGLOBIN g/dL 13 2 14 1  < > 14 1   HEMATOCRIT % 40 4 42 3  < > 42 4   PLATELETS Thousands/uL  --  426*  < > 435*   LYMPHO PCT %  --   --   --  3*   MONO PCT MAN %  --   --   --  3*   EOSINO PCT MANUAL %  --   --   --  0   < > = values in this interval not displayed  Results from last 7 days  Lab Units 09/11/17  0550 09/10/17  0531   SODIUM mmol/L 138 140   POTASSIUM mmol/L 4 1 3 6   CHLORIDE mmol/L 104 107   CO2 mmol/L 26 27   BUN mg/dL 13 13   CREATININE mg/dL 0 70 0 71   CALCIUM mg/dL 8 6 8 0*   TOTAL PROTEIN g/dL  --  6 0*   BILIRUBIN TOTAL mg/dL  --  0 76   ALK PHOS U/L  --  45*   ALT U/L  --  26   AST U/L  --  10   GLUCOSE RANDOM mg/dL 80 82           Results from last 7 days  Lab Units 09/09/17  1910   LIPASE u/L 176       Imaging Studies: I have personally reviewed pertinent imaging studies  Mri Abdomen Wo Contrast And Mrcp    Result Date: 9/13/2017  Impression: 1  Status post cholecystectomy with unchanged central intrahepatic and common bile duct dilatation  Common bile duct measures up to 1 3 cm  2   Filling defect seen within the cystic duct stump, stones versus sludge  No filling defects seen within the common bile duct    Workstation performed: [id number]     Fl Ercp Biliary Only    Result Date: 9/16/2017  Impression: Dilated common bile duct without discrete filling defect Workstation performed: [id number]

## 2017-09-17 PROCEDURE — C9113 INJ PANTOPRAZOLE SODIUM, VIA: HCPCS | Performed by: HOSPITALIST

## 2017-09-17 RX ORDER — OXYCODONE HYDROCHLORIDE 5 MG/1
5 TABLET ORAL EVERY 4 HOURS PRN
Status: DISCONTINUED | OUTPATIENT
Start: 2017-09-17 | End: 2017-09-29 | Stop reason: HOSPADM

## 2017-09-17 RX ORDER — MEPERIDINE HYDROCHLORIDE 50 MG/ML
50 INJECTION INTRAMUSCULAR; INTRAVENOUS; SUBCUTANEOUS EVERY 8 HOURS PRN
Status: DISCONTINUED | OUTPATIENT
Start: 2017-09-17 | End: 2017-09-18

## 2017-09-17 RX ORDER — ACETAMINOPHEN 325 MG/1
650 TABLET ORAL EVERY 6 HOURS PRN
Status: DISCONTINUED | OUTPATIENT
Start: 2017-09-17 | End: 2017-09-29 | Stop reason: HOSPADM

## 2017-09-17 RX ADMIN — MEPERIDINE HYDROCHLORIDE 50 MG: 50 INJECTION, SOLUTION INTRAMUSCULAR; INTRAVENOUS; SUBCUTANEOUS at 02:45

## 2017-09-17 RX ADMIN — OXYCODONE HYDROCHLORIDE 5 MG: 5 TABLET ORAL at 15:14

## 2017-09-17 RX ADMIN — LIOTHYRONINE SODIUM 5 MCG: 5 TABLET ORAL at 05:41

## 2017-09-17 RX ADMIN — PIPERACILLIN SODIUM AND TAZOBACTAM SODIUM 3.38 G: 36; 4.5 INJECTION, POWDER, FOR SOLUTION INTRAVENOUS at 23:25

## 2017-09-17 RX ADMIN — RIFAXIMIN 550 MG: 550 TABLET ORAL at 05:41

## 2017-09-17 RX ADMIN — PIPERACILLIN SODIUM AND TAZOBACTAM SODIUM 3.38 G: 36; 4.5 INJECTION, POWDER, FOR SOLUTION INTRAVENOUS at 05:41

## 2017-09-17 RX ADMIN — PIPERACILLIN SODIUM AND TAZOBACTAM SODIUM 3.38 G: 36; 4.5 INJECTION, POWDER, FOR SOLUTION INTRAVENOUS at 11:13

## 2017-09-17 RX ADMIN — LORAZEPAM 1 MG: 1 TABLET ORAL at 11:12

## 2017-09-17 RX ADMIN — LORAZEPAM 1 MG: 1 TABLET ORAL at 16:54

## 2017-09-17 RX ADMIN — DICYCLOMINE HYDROCHLORIDE 20 MG: 20 TABLET ORAL at 11:12

## 2017-09-17 RX ADMIN — PANTOPRAZOLE SODIUM 40 MG: 40 INJECTION, POWDER, FOR SOLUTION INTRAVENOUS at 20:12

## 2017-09-17 RX ADMIN — MEPERIDINE HYDROCHLORIDE 50 MG: 50 INJECTION, SOLUTION INTRAMUSCULAR; INTRAVENOUS; SUBCUTANEOUS at 06:53

## 2017-09-17 RX ADMIN — DICYCLOMINE HYDROCHLORIDE 20 MG: 20 TABLET ORAL at 16:54

## 2017-09-17 RX ADMIN — SODIUM CHLORIDE 80 ML/HR: 0.9 INJECTION, SOLUTION INTRAVENOUS at 23:25

## 2017-09-17 RX ADMIN — TRAMADOL HYDROCHLORIDE 50 MG: 50 TABLET, COATED ORAL at 23:26

## 2017-09-17 RX ADMIN — RIFAXIMIN 550 MG: 550 TABLET ORAL at 21:10

## 2017-09-17 RX ADMIN — OXYCODONE HYDROCHLORIDE 5 MG: 5 TABLET ORAL at 20:12

## 2017-09-17 RX ADMIN — DICYCLOMINE HYDROCHLORIDE 20 MG: 20 TABLET ORAL at 21:10

## 2017-09-17 RX ADMIN — DIPHENHYDRAMINE HYDROCHLORIDE 25 MG: 50 INJECTION, SOLUTION INTRAMUSCULAR; INTRAVENOUS at 15:14

## 2017-09-17 RX ADMIN — MEPERIDINE HYDROCHLORIDE 50 MG: 50 INJECTION, SOLUTION INTRAMUSCULAR; INTRAVENOUS; SUBCUTANEOUS at 16:54

## 2017-09-17 RX ADMIN — DICYCLOMINE HYDROCHLORIDE 20 MG: 20 TABLET ORAL at 06:15

## 2017-09-17 RX ADMIN — Medication 250 MG: at 16:54

## 2017-09-17 RX ADMIN — DIPHENHYDRAMINE HYDROCHLORIDE 25 MG: 50 INJECTION, SOLUTION INTRAMUSCULAR; INTRAVENOUS at 23:26

## 2017-09-17 RX ADMIN — LORAZEPAM 1 MG: 1 TABLET ORAL at 23:26

## 2017-09-17 RX ADMIN — LEVOTHYROXINE SODIUM 75 MCG: 75 TABLET ORAL at 05:41

## 2017-09-17 RX ADMIN — OXYCODONE HYDROCHLORIDE 5 MG: 5 TABLET ORAL at 11:12

## 2017-09-17 RX ADMIN — ONDANSETRON 4 MG: 2 INJECTION INTRAMUSCULAR; INTRAVENOUS at 09:06

## 2017-09-17 RX ADMIN — ENOXAPARIN SODIUM 40 MG: 40 INJECTION SUBCUTANEOUS at 09:06

## 2017-09-17 RX ADMIN — Medication 250 MG: at 09:06

## 2017-09-17 RX ADMIN — ONDANSETRON 4 MG: 2 INJECTION INTRAMUSCULAR; INTRAVENOUS at 15:14

## 2017-09-17 RX ADMIN — PIPERACILLIN SODIUM AND TAZOBACTAM SODIUM 3.38 G: 36; 4.5 INJECTION, POWDER, FOR SOLUTION INTRAVENOUS at 16:54

## 2017-09-17 RX ADMIN — SODIUM CHLORIDE 80 ML/HR: 0.9 INJECTION, SOLUTION INTRAVENOUS at 09:07

## 2017-09-17 RX ADMIN — RIFAXIMIN 550 MG: 550 TABLET ORAL at 12:57

## 2017-09-17 RX ADMIN — DIPHENHYDRAMINE HYDROCHLORIDE 25 MG: 50 INJECTION, SOLUTION INTRAMUSCULAR; INTRAVENOUS at 06:56

## 2017-09-17 RX ADMIN — ASPIRIN 325 MG: 325 TABLET ORAL at 09:06

## 2017-09-17 RX ADMIN — PIPERACILLIN SODIUM AND TAZOBACTAM SODIUM 3.38 G: 36; 4.5 INJECTION, POWDER, FOR SOLUTION INTRAVENOUS at 00:32

## 2017-09-17 RX ADMIN — TRAMADOL HYDROCHLORIDE 50 MG: 50 TABLET, COATED ORAL at 12:57

## 2017-09-17 RX ADMIN — PANTOPRAZOLE SODIUM 40 MG: 40 INJECTION, POWDER, FOR SOLUTION INTRAVENOUS at 09:06

## 2017-09-17 NOTE — PROGRESS NOTES
Rounding done with dr Erika Rico; wean iv meds off; change iv demerol to q8hrs prn; start oxycodone prn; continue iv abx;

## 2017-09-17 NOTE — PROGRESS NOTES
[facility] Progress Note  Patient: Manda Beavers 38 y o  female   MRN: [id number]  PCP: Pablo Saldaña PA-C  Unit/Bed#: [facility] 823-01 Encounter: [id number]  Date Of Visit: 17    Assessment:    Principal Problem:    Abdominal pain  Active Problems:    Colitis    Generalized anxiety disorder    Acquired hypothyroidism    Abnormal CT of the abdomen    Leukocytosis    Thrombophlebitis    RUQ pain      Plan:    · Right upper quadrant abdominal pain with sphincter of Oddi dysfunction noted  Symptoms of pain may be secondary to underlying irritable bowel syndrome  Discussed with GI  · Will start Elavil assuming benign allergic reaction  Will discuss with the patient further  · Continue with supportive care  · Reported history of inflammatory bowel  · Febrile illness-stable   · Food allergy to starch      VTE Pharmacologic Prophylaxis:   Pharmacologic: Enoxaparin (Lovenox)  Mechanical VTE Prophylaxis in Place: Yes    Patient Centered Rounds: I have performed bedside rounds with nursing staff today  Time Spent for Care: 15 minutes  More than 50% of total time spent on counseling and coordination of care as described above  Current Length of Stay: 7 day(s)    Current Patient Status: Inpatient   Certification Statement: The patient will continue to require additional inpatient hospital stay due to Need to monitor symptoms of pain  Begin to taper pain meds  Code Status: Level 1 - Full Code      Subjective:   Patient comfortable    Objective:     Vitals:   Temp (24hrs), Av °F (36 7 °C), Min:97 7 °F (36 5 °C), Max:98 3 °F (36 8 °C)    HR:  [72-91] 73  Resp:  [18] 18  BP: ()/(57-67) 106/67  SpO2:  [95 %-97 %] 97 %  Body mass index is 22 79 kg/m²  Input and Output Summary (last 24 hours):        Intake/Output Summary (Last 24 hours) at 17 0931  Last data filed at 17 0752   Gross per 24 hour   Intake             2827 ml   Output             2950 ml   Net -123 ml       Physical Exam:     Physical Exam   Constitutional: She is oriented to person, place, and time  She appears well-developed and well-nourished  HENT:   Head: Normocephalic and atraumatic  Eyes: Conjunctivae are normal  Pupils are equal, round, and reactive to light  Neck: Normal range of motion  Neck supple  No JVD present  No tracheal deviation present  No thyromegaly present  Cardiovascular: Regular rhythm  Pulmonary/Chest: Effort normal and breath sounds normal  No stridor  No respiratory distress  She has no wheezes  She has no rales  Abdominal: Soft  Bowel sounds are normal    Musculoskeletal: Normal range of motion  She exhibits no edema  Neurological: She is alert and oriented to person, place, and time  Skin: Skin is warm and dry  Psychiatric: She has a normal mood and affect  Additional Data:     Labs:      Results from last 7 days  Lab Units 09/14/17  1136 09/11/17  0550   WBC Thousand/uL  --  10 52*   HEMOGLOBIN g/dL 13 2 14 1   HEMATOCRIT % 40 4 42 3   PLATELETS Thousands/uL  --  426*       Results from last 7 days  Lab Units 09/11/17  0550   SODIUM mmol/L 138   POTASSIUM mmol/L 4 1   CHLORIDE mmol/L 104   CO2 mmol/L 26   BUN mg/dL 13   CREATININE mg/dL 0 70   CALCIUM mg/dL 8 6   GLUCOSE RANDOM mg/dL 80           * I Have Reviewed All Lab Data Listed Above  * Additional Pertinent Lab Tests Reviewed: All Labs Within Last 24 Hours Reviewed        Recent Cultures (last 7 days):       Results from last 7 days  Lab Units 09/11/17  2300 09/11/17  1122   BLOOD CULTURE   --  No Growth After 5 Days     C DIFF TOXIN B  NEGATIVE for C difficle toxin by PCR    --        Last 24 Hours Medication List:     aspirin 325 mg Oral Daily   dicyclomine 20 mg Oral 4x Daily (AC & HS)   enoxaparin 40 mg Subcutaneous Daily   levothyroxine 75 mcg Oral Early Morning   liothyronine 5 mcg Oral Early Morning   pantoprazole 40 mg Intravenous Q12H [address]   piperacillin-tazobactam 3 375 g Intravenous Q6H   rifaximin 550 mg Oral Q8H [facility]   saccharomyces boulardii 250 mg Oral BID        Today, Patient Was Seen By: Crys Palomino DO    ** Please Note: This note has been constructed using a voice recognition system   **

## 2017-09-17 NOTE — PROGRESS NOTES
Pt complaining of a "metallic taste in my mouth"; pt offers no other complaints; era piña with slim aware; will continue to monitor

## 2017-09-18 PROCEDURE — C9113 INJ PANTOPRAZOLE SODIUM, VIA: HCPCS | Performed by: HOSPITALIST

## 2017-09-18 RX ORDER — OXYCODONE HYDROCHLORIDE 5 MG/1
5 TABLET ORAL ONCE
Status: COMPLETED | OUTPATIENT
Start: 2017-09-18 | End: 2017-09-18

## 2017-09-18 RX ORDER — AMITRIPTYLINE HYDROCHLORIDE 25 MG/1
25 TABLET, FILM COATED ORAL
Status: DISCONTINUED | OUTPATIENT
Start: 2017-09-18 | End: 2017-09-19

## 2017-09-18 RX ORDER — DIPHENHYDRAMINE HYDROCHLORIDE 50 MG/ML
50 INJECTION INTRAMUSCULAR; INTRAVENOUS EVERY 6 HOURS PRN
Status: DISCONTINUED | OUTPATIENT
Start: 2017-09-18 | End: 2017-09-19

## 2017-09-18 RX ADMIN — PIPERACILLIN SODIUM AND TAZOBACTAM SODIUM 3.38 G: 36; 4.5 INJECTION, POWDER, FOR SOLUTION INTRAVENOUS at 05:15

## 2017-09-18 RX ADMIN — RIFAXIMIN 550 MG: 550 TABLET ORAL at 13:06

## 2017-09-18 RX ADMIN — DICYCLOMINE HYDROCHLORIDE 20 MG: 20 TABLET ORAL at 22:55

## 2017-09-18 RX ADMIN — OXYCODONE HYDROCHLORIDE 5 MG: 5 TABLET ORAL at 16:15

## 2017-09-18 RX ADMIN — TRAMADOL HYDROCHLORIDE 50 MG: 50 TABLET, COATED ORAL at 14:20

## 2017-09-18 RX ADMIN — PIPERACILLIN SODIUM AND TAZOBACTAM SODIUM 3.38 G: 36; 4.5 INJECTION, POWDER, FOR SOLUTION INTRAVENOUS at 22:54

## 2017-09-18 RX ADMIN — AMITRIPTYLINE HYDROCHLORIDE 25 MG: 25 TABLET, FILM COATED ORAL at 13:06

## 2017-09-18 RX ADMIN — LEVOTHYROXINE SODIUM 75 MCG: 75 TABLET ORAL at 05:16

## 2017-09-18 RX ADMIN — TRAMADOL HYDROCHLORIDE 50 MG: 50 TABLET, COATED ORAL at 08:17

## 2017-09-18 RX ADMIN — Medication 250 MG: at 17:15

## 2017-09-18 RX ADMIN — PIPERACILLIN SODIUM AND TAZOBACTAM SODIUM 3.38 G: 36; 4.5 INJECTION, POWDER, FOR SOLUTION INTRAVENOUS at 11:18

## 2017-09-18 RX ADMIN — DICYCLOMINE HYDROCHLORIDE 20 MG: 20 TABLET ORAL at 11:17

## 2017-09-18 RX ADMIN — PIPERACILLIN SODIUM AND TAZOBACTAM SODIUM 3.38 G: 36; 4.5 INJECTION, POWDER, FOR SOLUTION INTRAVENOUS at 16:16

## 2017-09-18 RX ADMIN — RIFAXIMIN 550 MG: 550 TABLET ORAL at 05:16

## 2017-09-18 RX ADMIN — PANTOPRAZOLE SODIUM 40 MG: 40 INJECTION, POWDER, FOR SOLUTION INTRAVENOUS at 20:07

## 2017-09-18 RX ADMIN — DIPHENHYDRAMINE HYDROCHLORIDE 25 MG: 50 INJECTION, SOLUTION INTRAMUSCULAR; INTRAVENOUS at 16:19

## 2017-09-18 RX ADMIN — PANTOPRAZOLE SODIUM 40 MG: 40 INJECTION, POWDER, FOR SOLUTION INTRAVENOUS at 08:08

## 2017-09-18 RX ADMIN — DICYCLOMINE HYDROCHLORIDE 20 MG: 20 TABLET ORAL at 16:15

## 2017-09-18 RX ADMIN — ENOXAPARIN SODIUM 40 MG: 40 INJECTION SUBCUTANEOUS at 08:08

## 2017-09-18 RX ADMIN — Medication 250 MG: at 08:08

## 2017-09-18 RX ADMIN — SODIUM CHLORIDE 80 ML/HR: 0.9 INJECTION, SOLUTION INTRAVENOUS at 23:00

## 2017-09-18 RX ADMIN — OXYCODONE HYDROCHLORIDE 5 MG: 5 TABLET ORAL at 18:54

## 2017-09-18 RX ADMIN — DICYCLOMINE HYDROCHLORIDE 20 MG: 20 TABLET ORAL at 06:00

## 2017-09-18 RX ADMIN — SODIUM CHLORIDE 80 ML/HR: 0.9 INJECTION, SOLUTION INTRAVENOUS at 13:07

## 2017-09-18 RX ADMIN — LORAZEPAM 1 MG: 1 TABLET ORAL at 14:26

## 2017-09-18 RX ADMIN — ONDANSETRON 4 MG: 2 INJECTION INTRAMUSCULAR; INTRAVENOUS at 08:17

## 2017-09-18 RX ADMIN — OXYCODONE HYDROCHLORIDE 5 MG: 5 TABLET ORAL at 06:32

## 2017-09-18 RX ADMIN — LORAZEPAM 1 MG: 1 TABLET ORAL at 20:07

## 2017-09-18 RX ADMIN — OXYCODONE HYDROCHLORIDE 5 MG: 5 TABLET ORAL at 20:08

## 2017-09-18 RX ADMIN — DIPHENHYDRAMINE HYDROCHLORIDE 25 MG: 50 INJECTION, SOLUTION INTRAMUSCULAR; INTRAVENOUS at 08:17

## 2017-09-18 RX ADMIN — OXYCODONE HYDROCHLORIDE 5 MG: 5 TABLET ORAL at 11:17

## 2017-09-18 RX ADMIN — MEPERIDINE HYDROCHLORIDE 50 MG: 50 INJECTION, SOLUTION INTRAMUSCULAR; INTRAVENOUS; SUBCUTANEOUS at 02:14

## 2017-09-18 RX ADMIN — ASPIRIN 325 MG: 325 TABLET ORAL at 08:08

## 2017-09-18 RX ADMIN — DIPHENHYDRAMINE HYDROCHLORIDE 50 MG: 50 INJECTION, SOLUTION INTRAMUSCULAR; INTRAVENOUS at 20:07

## 2017-09-18 RX ADMIN — LIOTHYRONINE SODIUM 5 MCG: 5 TABLET ORAL at 05:16

## 2017-09-18 RX ADMIN — RIFAXIMIN 550 MG: 550 TABLET ORAL at 22:54

## 2017-09-18 NOTE — PROGRESS NOTES
[facility]  Progress Note - Jozef Cantu 38 y o  female MRN: [id number]    Unit/Bed#: [facility] 823-01 Encounter: [id number]    Assessment/Plan:  #1  Right upper quadrant abdominal pain   -she has a history of sphincter Oddi dysfunction however she is status post ERCP Saturday which showed an open previous sphincterotomy  Labs within normal limits       -she states she was diagnosed with Crohn's at age 22 and this diagnosis was later amended to ulcerative colitis however she had a colonoscopy which was unremarkable with random biopsies that did not have any evidence of active colitis  -her symptoms are consistent with IBS, she is going to start Elavil today  Continue Xifaxan, Florastor and Bentyl    -small meals   -diet as tolerated    Subjective:   Patient seen in the past   She reports her pain continues, crampy and right upper quadrant  She rates it as 7/10 currently  She states she had a soft but formed brown bowel movement this morning  She reports the pain is worse with eating and optimally worse immediately before bowel movement and relieved after a bowel movement  She states that she feels movement and churning in her abdomen  She is scheduled to receive her 1st dose of Elavil today  Objective:     Vitals: Blood pressure 99/64, pulse 72, temperature 98 5 °F (36 9 °C), temperature source Oral, resp  rate 18, height 5' 5 25" (1 657 m), weight 62 6 kg (138 lb), SpO2 96 %, not currently breastfeeding  ,Body mass index is 22 79 kg/m²  Intake/Output Summary (Last 24 hours) at 09/18/17 1150  Last data filed at 09/18/17 1003   Gross per 24 hour   Intake          2601 66 ml   Output             2400 ml   Net           201 66 ml       Review of Systems: as per HPI  Review of Systems   Constitutional: Negative for activity change, appetite change, chills, fatigue, fever and unexpected weight change  HENT: Negative for mouth sores, sore throat and trouble swallowing      Respiratory: Negative for shortness of breath  Cardiovascular: Negative for chest pain  Gastrointestinal: Positive for abdominal pain  Negative for abdominal distention, blood in stool, constipation, diarrhea, nausea and vomiting  Skin: Negative for color change, pallor, rash and wound  Neurological: Negative for tremors and syncope  All other systems reviewed and are negative  Physical Exam:     Physical Exam   Constitutional: She is oriented to person, place, and time  She appears well-developed and well-nourished  No distress  Appears uncomfortable   HENT:   Head: Normocephalic and atraumatic  Eyes: Right eye exhibits no discharge  Left eye exhibits no discharge  No scleral icterus  Neck: Neck supple  No tracheal deviation present  Cardiovascular: Normal rate, regular rhythm and normal heart sounds  Exam reveals no gallop and no friction rub  No murmur heard  Pulmonary/Chest: Effort normal and breath sounds normal  No respiratory distress  She has no wheezes  She has no rales  Abdominal: Soft  She exhibits no distension, no ascites and no mass  Bowel sounds are increased  There is no tenderness  There is no rebound and no guarding  Neurological: She is alert and oriented to person, place, and time  Skin: Skin is warm and dry  Psychiatric: She has a normal mood and affect           Invasive Devices     Peripheral Intravenous Line            Peripheral IV 09/17/17 Left Antecubital less than 1 day

## 2017-09-18 NOTE — PROGRESS NOTES
[facility] Progress Note  Patient: Celina Sarmiento 38 y o  female   MRN: [id number]  PCP: Rogerio Albert PA-C  Unit/Bed#: [facility] 823-01 Encounter: [id number]  Date Of Visit: 17    Assessment:    Principal Problem:    Abdominal pain  Active Problems:    Colitis    Generalized anxiety disorder    Acquired hypothyroidism    Abnormal CT of the abdomen    Leukocytosis    Thrombophlebitis    RUQ pain      Plan:    · Right upper quadrant abdominal pain with sphincter of Oddi dysfunction  · Discussed with GI  There may be a component of irritable bowel associated with patient's symptoms  To consider a trial of Elavil however patient's allergy profile appears somewhat prohibitive to that end  · Continue with current regimen  · DC Demerol  · Oral pain killers  · Continue with supportive care  Will start Elavil  · History of inflammatory bowel  · Febrile illness  · Food allergy to corn and starch derivatives      VTE Pharmacologic Prophylaxis:   Pharmacologic: Enoxaparin (Lovenox)  Mechanical VTE Prophylaxis in Place: Yes    Patient Centered Rounds: I have performed bedside rounds with nursing staff today  Time Spent for Care: 15 minutes  More than 50% of total time spent on counseling and coordination of care as described above  Current Length of Stay: 8 day(s)    Current Patient Status: Inpatient   Certification Statement: The patient will continue to require additional inpatient hospital stay due to Need to monitor symptoms    Discharge Plan / Estimated Discharge Date:  Number the for discharge  Will wean off of IV narcotics  Discussed with patient at length  Code Status: Level 1 - Full Code      Subjective:   Patient comfortable  Still asking for IV pain meds  Discussed regimen in detail with patient      Objective:     Vitals:   Temp (24hrs), Av 5 °F (36 9 °C), Min:98 4 °F (36 9 °C), Max:98 7 °F (37 1 °C)    HR:  [70-87] 72  Resp:  [16-18] 18  BP: (88-99)/(54-64) 99/64  SpO2:  [96 %-97 %] 96 %  Body mass index is 22 79 kg/m²  Input and Output Summary (last 24 hours): Intake/Output Summary (Last 24 hours) at 09/18/17 0912  Last data filed at 09/18/17 0803   Gross per 24 hour   Intake          2471 66 ml   Output             2400 ml   Net            71 66 ml       Physical Exam:     Physical Exam   Constitutional: She is oriented to person, place, and time  HENT:   Head: Normocephalic and atraumatic  Eyes: Pupils are equal, round, and reactive to light  Abdominal: Bowel sounds are normal  She exhibits no distension  There is no tenderness  There is no rebound  Musculoskeletal: She exhibits no edema  Neurological: She is alert and oriented to person, place, and time  Additional Data:     Labs:      Results from last 7 days  Lab Units 09/14/17  1136   HEMOGLOBIN g/dL 13 2   HEMATOCRIT % 40 4           Invalid input(s): LABALBU        * I Have Reviewed All Lab Data Listed Above  * Additional Pertinent Lab Tests Reviewed: All Labs Within Last 24 Hours Reviewed        Recent Cultures (last 7 days):       Results from last 7 days  Lab Units 09/11/17  2300 09/11/17  1122   BLOOD CULTURE   --  No Growth After 5 Days  C DIFF TOXIN B  NEGATIVE for C difficle toxin by PCR    --        Last 24 Hours Medication List:     aspirin 325 mg Oral Daily   dicyclomine 20 mg Oral 4x Daily (AC & HS)   enoxaparin 40 mg Subcutaneous Daily   levothyroxine 75 mcg Oral Early Morning   liothyronine 5 mcg Oral Early Morning   pantoprazole 40 mg Intravenous Q12H [address]   piperacillin-tazobactam 3 375 g Intravenous Q6H   rifaximin 550 mg Oral Q8H [address]   saccharomyces boulardii 250 mg Oral BID        Today, Patient Was Seen By: Gage Tejeda DO    ** Please Note: This note has been constructed using a voice recognition system   **

## 2017-09-18 NOTE — MEDICAL STUDENT
Progress Note - Maurisio Hsu 38 y o  female MRN: [id number]    Unit/Bed#: [facility] 823-01 Encounter: [id number]    Assessment:  38 y o  F w/ a hx of cholecystectomy and sphincter of Oddi dysfunction w/ multiple ERCPs and stent placement presented to [facility] on 9/9 for worsening RUQ abdominal pain and is s/p ERCP on 9/15/17     Plan:  1  RUQ abdominal pain w/ sphincter of Oddi dysfunction   · ERCP (9/15/17): Dilated common bile duct without discrete filling defect  MRI abdomen wo contrast and MRCP (9/13/17): status post cholecystectomy with unchanged central intrahepatic and common bile duct dilatation  Common bile duct measures up to 1 3 cm  Filling defect seen within the cystic duct stump, stones versus sludge  No filling defects seen within the common bile duct     · Continue dicyclomine 20mg QID and amitriptyline 25mg PO qHS (first dose of amitriptyline will be at 11am this morning)   · Follow GI's recommendations   · Inpatient pain management following  ? Continue oxycodone 5mg PO q4h for severe pain, Tramadol 50mg PO q6h for moderate pain, d/c Demerol   2  Ulcerative colitis  · Colonoscopy (9/12/17): normal terminal ileum  Normal entire colon  Multiple biopsies taken, which were all bengin  · Continue to follow GI's recommendations  · Outpatient F/U w/ GI  3  Febrile illness  · Patient continues to remain afebrile   · On Zosyn 3 375g  4  Hypothyroidism  · Continue levothyroxine 75mcg and liothyronine 5mcg  5  Migrainous HAs w/ aura   · Consider inpatient neurology consult if symptoms recur during hospitalization; otherwise outpatient F/U w/ neurology   6  Anxiety   · Lorazepam 1mg q6h prn     Subjective:   Reports that her pain is currently about a 7/10  Pain is localized to RUQ, sharp in nature, comes and goes, and gets more aggregated after eating  Notices that HAs happen to occur when her RUQ pain is acting up, but hasn't had a bad HA for the past few days   Slept well overnight but required 50mg injection of demerol in order to fall asleep at 2am  +N but no vomiting or diarrhea  Had 1 dark, soft, formed BM this morning  Patient is OOB and ambulating  Objective:     Vitals: Blood pressure 99/64, pulse 72, temperature 98 5 °F (36 9 °C), temperature source Oral, resp  rate 18, height 5' 5 25" (1 657 m), weight 62 6 kg (138 lb), SpO2 96 %, not currently breastfeeding  ,Body mass index is 22 79 kg/m²  Intake/Output Summary (Last 24 hours) at 09/18/17 1049  Last data filed at 09/18/17 1003   Gross per 24 hour   Intake          2651 66 ml   Output             2400 ml   Net           251 66 ml     Physical Exam: BP 99/64   Pulse 72   Temp 98 5 °F (36 9 °C) (Oral)   Resp 18   Ht 5' 5 25" (1 657 m)   Wt 62 6 kg (138 lb)   SpO2 96%   BMI 22 79 kg/m²   General appearance: alert, appears stated age, cooperative and no distress  Head: Normocephalic, without obvious abnormality, atraumatic  Eyes: PERRL  Lungs: clear to auscultation bilaterally  Heart: regular rate and rhythm, S1, S2 normal, no murmur, click, rub or gallop  Abdomen: BS present in all 4 quadrants, soft, non-distended, old surgical scars and present and well-healed, mildly tender to palpation in RUQ, -Mejia's sign, no guarding or rebound  Extremities: extremities normal, atraumatic, no cyanosis or edema  Skin: Skin color, texture, turgor normal  No rashes or lesions     Invasive Devices     Peripheral Intravenous Line            Peripheral IV 09/17/17 Left Antecubital less than 1 day              Lab, Imaging and other studies: I have personally reviewed pertinent reports       Lab Results   Component Value Date    WBC 10 52 (H) 09/11/2017    HGB 13 2 09/14/2017    HCT 40 4 09/14/2017    MCV 96 09/11/2017     (H) 09/11/2017       Lab Results   Component Value Date    GLUCOSE 80 09/11/2017    CALCIUM 8 6 09/11/2017     09/11/2017    K 4 1 09/11/2017    CO2 26 09/11/2017     09/11/2017    BUN 13 09/11/2017    CREATININE 0 70 09/11/2017 -eGFR: 106  -C diff toxin: negative  -Blood cultures: no growth at 5d    -9/15/17 ERCP: Dilated common bile duct without discrete filling defect    -9/13/17 MRI abdomen wo contrast and MRCP: status post cholecystectomy with unchanged central intrahepatic and common bile duct dilatation  Common bile duct measures up to 1 3 cm  Filling defect seen within the cystic duct stump, stones versus sludge  No filling defects seen within the common bile duct     -9/12/17 Colonoscopy: normal terminal ileum  Normal entire colon  Multiple biopsies taken    A  Duodenum, biopsy:     - No significant pathologic abnormalities  - No villous atrophy, increased intraepithelial lymphocytes or crypt hyperplasia to suggest       malabsorptive enteropathy      - No active inflammation, granulomas, organisms, dysplasia or neoplasia identified  B   Stomach, biopsy:     - Antral and oxyntoantral mucosa with mild chronic inactive gastritis  - No Helicobacter pylori organisms identified on immunostaining      - No intestinal metaplasia, dysplasia or neoplasia identified  C   Colon, random biopsy:     - Colonic mucosa with nonspecific glandular regenerative changes      - No active inflammation or histologic evidence of a microscopic (lymphocytic) or collagenous colitis noted  - No granulomas, dysplasia or neoplasia identified      VTE Pharmacologic Prophylaxis: Enoxaparin (Lovenox)  VTE Mechanical Prophylaxis: sequential compression device

## 2017-09-19 PROCEDURE — C9113 INJ PANTOPRAZOLE SODIUM, VIA: HCPCS | Performed by: HOSPITALIST

## 2017-09-19 RX ORDER — DIPHENHYDRAMINE HYDROCHLORIDE 50 MG/ML
50 INJECTION INTRAMUSCULAR; INTRAVENOUS EVERY 4 HOURS PRN
Status: DISCONTINUED | OUTPATIENT
Start: 2017-09-19 | End: 2017-09-22

## 2017-09-19 RX ADMIN — RIFAXIMIN 550 MG: 550 TABLET ORAL at 06:28

## 2017-09-19 RX ADMIN — RIFAXIMIN 550 MG: 550 TABLET ORAL at 13:10

## 2017-09-19 RX ADMIN — DICYCLOMINE HYDROCHLORIDE 20 MG: 20 TABLET ORAL at 06:28

## 2017-09-19 RX ADMIN — LORAZEPAM 1 MG: 1 TABLET ORAL at 21:47

## 2017-09-19 RX ADMIN — DIPHENHYDRAMINE HYDROCHLORIDE 50 MG: 50 INJECTION, SOLUTION INTRAMUSCULAR; INTRAVENOUS at 01:06

## 2017-09-19 RX ADMIN — LIOTHYRONINE SODIUM 5 MCG: 5 TABLET ORAL at 06:28

## 2017-09-19 RX ADMIN — SODIUM CHLORIDE 80 ML/HR: 0.9 INJECTION, SOLUTION INTRAVENOUS at 13:11

## 2017-09-19 RX ADMIN — LORAZEPAM 1 MG: 1 TABLET ORAL at 13:10

## 2017-09-19 RX ADMIN — DIPHENHYDRAMINE HYDROCHLORIDE 50 MG: 50 INJECTION, SOLUTION INTRAMUSCULAR; INTRAVENOUS at 13:10

## 2017-09-19 RX ADMIN — OXYCODONE HYDROCHLORIDE 5 MG: 5 TABLET ORAL at 13:10

## 2017-09-19 RX ADMIN — OXYCODONE HYDROCHLORIDE 5 MG: 5 TABLET ORAL at 17:23

## 2017-09-19 RX ADMIN — Medication 250 MG: at 17:23

## 2017-09-19 RX ADMIN — LORAZEPAM 1 MG: 1 TABLET ORAL at 06:34

## 2017-09-19 RX ADMIN — RIFAXIMIN 550 MG: 550 TABLET ORAL at 21:47

## 2017-09-19 RX ADMIN — LEVOTHYROXINE SODIUM 75 MCG: 75 TABLET ORAL at 06:28

## 2017-09-19 RX ADMIN — PANTOPRAZOLE SODIUM 40 MG: 40 INJECTION, POWDER, FOR SOLUTION INTRAVENOUS at 21:46

## 2017-09-19 RX ADMIN — TRAMADOL HYDROCHLORIDE 50 MG: 50 TABLET, COATED ORAL at 02:29

## 2017-09-19 RX ADMIN — OXYCODONE HYDROCHLORIDE 5 MG: 5 TABLET ORAL at 07:16

## 2017-09-19 RX ADMIN — DIPHENHYDRAMINE HYDROCHLORIDE 50 MG: 50 INJECTION, SOLUTION INTRAMUSCULAR; INTRAVENOUS at 21:47

## 2017-09-19 RX ADMIN — PIPERACILLIN SODIUM AND TAZOBACTAM SODIUM 3.38 G: 36; 4.5 INJECTION, POWDER, FOR SOLUTION INTRAVENOUS at 05:03

## 2017-09-19 RX ADMIN — DIPHENHYDRAMINE HYDROCHLORIDE 50 MG: 50 INJECTION, SOLUTION INTRAMUSCULAR; INTRAVENOUS at 06:29

## 2017-09-19 RX ADMIN — Medication 250 MG: at 07:16

## 2017-09-19 RX ADMIN — ONDANSETRON 4 MG: 2 INJECTION INTRAMUSCULAR; INTRAVENOUS at 13:10

## 2017-09-19 RX ADMIN — ASPIRIN 325 MG: 325 TABLET ORAL at 07:16

## 2017-09-19 RX ADMIN — PIPERACILLIN SODIUM AND TAZOBACTAM SODIUM 3.38 G: 36; 4.5 INJECTION, POWDER, FOR SOLUTION INTRAVENOUS at 11:25

## 2017-09-19 RX ADMIN — OXYCODONE HYDROCHLORIDE 5 MG: 5 TABLET ORAL at 21:47

## 2017-09-19 RX ADMIN — ENOXAPARIN SODIUM 40 MG: 40 INJECTION SUBCUTANEOUS at 07:17

## 2017-09-19 RX ADMIN — DIPHENHYDRAMINE HYDROCHLORIDE 50 MG: 50 INJECTION, SOLUTION INTRAMUSCULAR; INTRAVENOUS at 17:23

## 2017-09-19 RX ADMIN — PANTOPRAZOLE SODIUM 40 MG: 40 INJECTION, POWDER, FOR SOLUTION INTRAVENOUS at 07:16

## 2017-09-19 RX ADMIN — ONDANSETRON 4 MG: 2 INJECTION INTRAMUSCULAR; INTRAVENOUS at 21:59

## 2017-09-19 NOTE — SOCIAL WORK
CM met with Dr Danny Holland  And for tentative d/c to home if pain improving   When medically clear she will d/c home with famil and follow up outpatient with [facility]

## 2017-09-19 NOTE — PROGRESS NOTES
Rounding done with dr Claritza James; change iv benadryl from q6hrs prn to q4hrs prn; continue ivf's; continue current treatment

## 2017-09-19 NOTE — PROGRESS NOTES
[facility]  Progress Note - Carly Jones 38 y o  female MRN: [id number]    [facility] En[facility]: [id number]    ADDENDUM: Discussed with Dr Hima Skelton  She has an allergy to corn and thus amitriptylline /nortyptiline are not options to treat her IBS  Possible TCAs without known allergens for her include doxepin and desipramine however I am unfamiliar with use of these TCAs in IBS  Recommend psych consult to discuss possible treatment with a TCA if they feel it appropriate  Assessment/Plan:  #1   Right upper quadrant abdominal pain                        -Likely functional in nature however this has been challenging to treat given her multiple allergies/sensativities                         -Elavil discontinued secondary to allergic reaction  She is also having blurred vision with Bentyl, discontinue this as well        -Continue Xifaxan, Florastor and Levsin as needed                         -Low FODMAP diet    -Recommend ambulation as tolerated    -Follow up with [facility] as an outpatient for sphincter of oddi dysfunction  Subjective:   Patient seen and examined at bedside  Unfortunately she had an allergic reaction to follow, she describes red itchy bumps on her neck and chest as well as global itching  This is improved with adminstration of Benadryl  She also reports that she has had some blurred vision which she states she has had in the past when taking Bentyl  She continues to have the abdominal pain and rates it as a 7 5/10  She denies any change in her symptoms or new symptoms today  Objective:     Vitals: Blood pressure 99/55, pulse 67, temperature 97 8 °F (36 6 °C), temperature source Oral, resp  rate 18, height 5' 5 25" (1 657 m), weight 62 6 kg (138 lb), SpO2 96 %, not currently breastfeeding  ,Body mass index is 22 79 kg/m²        Intake/Output Summary (Last 24 hours) at 09/19/17 1504  Last data filed at 09/19/17 0716   Gross per 24 hour   Intake 1109 34 ml   Output             2300 ml   Net         -1190 66 ml       Review of Systems: as per HPI  Review of Systems   Constitutional: Positive for appetite change  Negative for chills, diaphoresis, fatigue, fever and unexpected weight change  HENT: Negative for mouth sores, sore throat and trouble swallowing  Respiratory: Negative for shortness of breath  Cardiovascular: Negative for chest pain  Gastrointestinal: Positive for abdominal pain  Negative for abdominal distention, blood in stool, constipation, diarrhea, nausea and vomiting  Skin: Negative for color change, pallor, rash and wound  Neurological: Negative for tremors and syncope  All other systems reviewed and are negative  Physical Exam:     Physical Exam   Constitutional: She is oriented to person, place, and time  She appears well-developed and well-nourished  No distress  HENT:   Head: Normocephalic and atraumatic  Eyes: Right eye exhibits no discharge  Left eye exhibits no discharge  No scleral icterus  Neck: Neck supple  No tracheal deviation present  Cardiovascular: Normal rate, regular rhythm and normal heart sounds  Exam reveals no gallop and no friction rub  No murmur heard  Pulmonary/Chest: Effort normal and breath sounds normal  No respiratory distress  She has no wheezes  She has no rales  Abdominal: Soft  Bowel sounds are normal  She exhibits no distension and no mass  There is tenderness  There is no rebound and no guarding  Neurological: She is alert and oriented to person, place, and time  Skin: Skin is warm and dry  Psychiatric: She exhibits a depressed mood           Invasive Devices     Peripheral Intravenous Line            Peripheral IV 09/17/17 Left Antecubital 1 day

## 2017-09-19 NOTE — PROGRESS NOTES
Patient started on Elavil today  First dose was given at 1306  Patient complaining of feeling itchy and beginning to have redness and raised bumps on face and decollete area  Denies feeling short of breath or having trouble breathing  Paged slim and spoke with Sheila Dunlap PA-C  Benadryl 50mg was ordered and 2200 dose of Elavil to be held  Will continue to monitor

## 2017-09-19 NOTE — PROGRESS NOTES
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  - s/p ERCP on 9/16 which shoved open sphincterotomy done before  - patient was suggested [facility] as point of care but she soon found that center does not provide the suggested Oddi pressure measurement  - as per patient Dr Amanda Spears was looking for facility in [place] that could have provided this------> communicate with GI team  - elavil d/c after hives last night------> checked with pharmacy a formulation corn starch free can be found as OP researching www  [email or web address]  com  - this was communicated with patient  - GI suggested [facility]-----> they will d/w patient as she will need for this encounter with psych-----> will follow outcome    Elavil allergic reaction  Corn starch allergy  - c/w benadryl IV for another 24-48 hrs  - switch then to PO    Febrile illness  - started on zosyn  - all Cx negative and low suspicion for colitis  - d/c zosyn # 10  - monitor off abx  - cbc in am    Reported UC diagnosis  - not proven on colonoscopy and biopsies done by our GI group  - monitor for symptoms      VTE Pharmacologic Prophylaxis: yes  Pharmacologic: Enoxaparin (Lovenox)  Mechanical VTE Prophylaxis in Place: Yes    Patient Centered Rounds: I have performed bedside rounds with nursing staff today  Discussions with Specialists or Other Care Team Provider: pharmacy and GI    Education and Discussions with Family / Patient: patient    Time Spent for Care: 1 hour  More than 50% of total time spent on counseling and coordination of care as described above      Current Length of Stay: 9 day(s)    Current Patient Status: Inpatient   Certification Statement: The patient will continue to require additional inpatient hospital stay due to please refer to above plan    Discharge Plan: home in 2-3 days    Code Status: Level 1 - Full Code      Subjective:   Patient reports hives with elavil last night  Some RUQ pain but tolerable    Objective:     Vitals:   Temp (24hrs), Av °F (36 7 °C), Min:97 7 °F (36 5 °C), Max:98 5 °F (36 9 °C)    HR:  [67-79] 79  Resp:  [18] 18  BP: ()/(55-61) 102/61  SpO2:  [96 %-98 %] 98 %  Body mass index is 22 79 kg/m²  Input and Output Summary (last 24 hours): Intake/Output Summary (Last 24 hours) at 17 1637  Last data filed at 17 0716   Gross per 24 hour   Intake           857 34 ml   Output             2300 ml   Net         -1442 66 ml       Physical Exam:     Physical Exam   Constitutional: She is oriented to person, place, and time  She appears well-developed  Cardiovascular: Normal rate, regular rhythm and normal heart sounds  Exam reveals no friction rub  No murmur heard  Pulmonary/Chest: Effort normal and breath sounds normal  No respiratory distress  She has no wheezes  She has no rales  Abdominal: Soft  She exhibits no distension  There is tenderness (RUQ)  There is no rebound and no guarding  Musculoskeletal: She exhibits no edema  Neurological: She is alert and oriented to person, place, and time  Skin: Skin is warm  Psychiatric: She has a normal mood and affect  Additional Data:     Labs:      Results from last 7 days  Lab Units 17  1136   HEMOGLOBIN g/dL 13 2   HEMATOCRIT % 40 4           Invalid input(s): LABALBU        * I Have Reviewed All Lab Data Listed Above  * Additional Pertinent Lab Tests Reviewed:  All Labs Within Last 24 Hours Reviewed    Imaging:    Imaging Reports Reviewed Today Include: none  Imaging Personally Reviewed by Myself Includes:  none    Recent Cultures (last 7 days):           Last 24 Hours Medication List:     aspirin 325 mg Oral Daily   enoxaparin 40 mg Subcutaneous Daily   levothyroxine 75 mcg Oral Early Morning   liothyronine 5 mcg Oral Early Morning   pantoprazole 40 mg Intravenous Q12H KARISSA   rifaximin 550 mg Oral Q8H [facility]   saccharomyces boulardii 250 mg Oral BID        Today, Patient Was Seen By: Yovany Meeks MD    ** Please Note: Dragon 360 Dictation voice to text software may have been used in the creation of this document   **

## 2017-09-20 DIAGNOSIS — R07.89 OTHER CHEST PAIN: ICD-10-CM

## 2017-09-20 LAB
ANION GAP SERPL CALCULATED.3IONS-SCNC: 7 MMOL/L (ref 4–13)
BASOPHILS # BLD AUTO: 0.07 THOUSANDS/ΜL (ref 0–0.1)
BASOPHILS NFR BLD AUTO: 1 % (ref 0–1)
BUN SERPL-MCNC: 11 MG/DL (ref 5–25)
CALCIUM SERPL-MCNC: 8.3 MG/DL (ref 8.3–10.1)
CHLORIDE SERPL-SCNC: 105 MMOL/L (ref 100–108)
CO2 SERPL-SCNC: 29 MMOL/L (ref 21–32)
CREAT SERPL-MCNC: 0.76 MG/DL (ref 0.6–1.3)
EOSINOPHIL # BLD AUTO: 0.37 THOUSAND/ΜL (ref 0–0.61)
EOSINOPHIL NFR BLD AUTO: 5 % (ref 0–6)
ERYTHROCYTE [DISTWIDTH] IN BLOOD BY AUTOMATED COUNT: 13.2 % (ref 11.6–15.1)
GFR SERPL CREATININE-BSD FRML MDRD: 96 ML/MIN/1.73SQ M
GLUCOSE SERPL-MCNC: 82 MG/DL (ref 65–140)
HCT VFR BLD AUTO: 41.3 % (ref 34.8–46.1)
HGB BLD-MCNC: 13.2 G/DL (ref 11.5–15.4)
LYMPHOCYTES # BLD AUTO: 2.23 THOUSANDS/ΜL (ref 0.6–4.47)
LYMPHOCYTES NFR BLD AUTO: 31 % (ref 14–44)
MAGNESIUM SERPL-MCNC: 2.3 MG/DL (ref 1.6–2.6)
MCH RBC QN AUTO: 31.6 PG (ref 26.8–34.3)
MCHC RBC AUTO-ENTMCNC: 32 G/DL (ref 31.4–37.4)
MCV RBC AUTO: 99 FL (ref 82–98)
MONOCYTES # BLD AUTO: 0.58 THOUSAND/ΜL (ref 0.17–1.22)
MONOCYTES NFR BLD AUTO: 8 % (ref 4–12)
NEUTROPHILS # BLD AUTO: 3.88 THOUSANDS/ΜL (ref 1.85–7.62)
NEUTS SEG NFR BLD AUTO: 55 % (ref 43–75)
NRBC BLD AUTO-RTO: 0 /100 WBCS
PHOSPHATE SERPL-MCNC: 2.1 MG/DL (ref 2.7–4.5)
PLATELET # BLD AUTO: 316 THOUSANDS/UL (ref 149–390)
PMV BLD AUTO: 9.4 FL (ref 8.9–12.7)
POTASSIUM SERPL-SCNC: 3.7 MMOL/L (ref 3.5–5.3)
RBC # BLD AUTO: 4.18 MILLION/UL (ref 3.81–5.12)
SODIUM SERPL-SCNC: 141 MMOL/L (ref 136–145)
WBC # BLD AUTO: 7.16 THOUSAND/UL (ref 4.31–10.16)

## 2017-09-20 PROCEDURE — 85025 COMPLETE CBC W/AUTO DIFF WBC: CPT | Performed by: INTERNAL MEDICINE

## 2017-09-20 PROCEDURE — C9113 INJ PANTOPRAZOLE SODIUM, VIA: HCPCS | Performed by: HOSPITALIST

## 2017-09-20 PROCEDURE — 83735 ASSAY OF MAGNESIUM: CPT | Performed by: INTERNAL MEDICINE

## 2017-09-20 PROCEDURE — 80048 BASIC METABOLIC PNL TOTAL CA: CPT | Performed by: INTERNAL MEDICINE

## 2017-09-20 PROCEDURE — 84100 ASSAY OF PHOSPHORUS: CPT | Performed by: INTERNAL MEDICINE

## 2017-09-20 RX ADMIN — RIFAXIMIN 550 MG: 550 TABLET ORAL at 06:17

## 2017-09-20 RX ADMIN — POTASSIUM PHOSPHATE, MONOBASIC AND POTASSIUM PHOSPHATE, DIBASIC 21 MMOL: 224; 236 INJECTION, SOLUTION INTRAVENOUS at 10:28

## 2017-09-20 RX ADMIN — TRAMADOL HYDROCHLORIDE 50 MG: 50 TABLET, COATED ORAL at 07:07

## 2017-09-20 RX ADMIN — PREDNISONE 50 MG: 20 TABLET ORAL at 13:26

## 2017-09-20 RX ADMIN — LORAZEPAM 1 MG: 1 TABLET ORAL at 23:10

## 2017-09-20 RX ADMIN — DIPHENHYDRAMINE HYDROCHLORIDE 50 MG: 50 INJECTION, SOLUTION INTRAMUSCULAR; INTRAVENOUS at 01:48

## 2017-09-20 RX ADMIN — DIPHENHYDRAMINE HYDROCHLORIDE 50 MG: 50 INJECTION, SOLUTION INTRAMUSCULAR; INTRAVENOUS at 10:27

## 2017-09-20 RX ADMIN — ENOXAPARIN SODIUM 40 MG: 40 INJECTION SUBCUTANEOUS at 09:31

## 2017-09-20 RX ADMIN — DIPHENHYDRAMINE HYDROCHLORIDE 50 MG: 50 INJECTION, SOLUTION INTRAMUSCULAR; INTRAVENOUS at 14:34

## 2017-09-20 RX ADMIN — ONDANSETRON 4 MG: 2 INJECTION INTRAMUSCULAR; INTRAVENOUS at 12:58

## 2017-09-20 RX ADMIN — Medication 250 MG: at 17:32

## 2017-09-20 RX ADMIN — RIFAXIMIN 550 MG: 550 TABLET ORAL at 13:26

## 2017-09-20 RX ADMIN — LORAZEPAM 1 MG: 1 TABLET ORAL at 14:33

## 2017-09-20 RX ADMIN — TRAMADOL HYDROCHLORIDE 50 MG: 50 TABLET, COATED ORAL at 01:06

## 2017-09-20 RX ADMIN — TRAMADOL HYDROCHLORIDE 50 MG: 50 TABLET, COATED ORAL at 12:58

## 2017-09-20 RX ADMIN — DIPHENHYDRAMINE HYDROCHLORIDE 50 MG: 50 INJECTION, SOLUTION INTRAMUSCULAR; INTRAVENOUS at 06:17

## 2017-09-20 RX ADMIN — PANTOPRAZOLE SODIUM 40 MG: 40 INJECTION, POWDER, FOR SOLUTION INTRAVENOUS at 09:26

## 2017-09-20 RX ADMIN — LORAZEPAM 1 MG: 1 TABLET ORAL at 06:23

## 2017-09-20 RX ADMIN — LEVOTHYROXINE SODIUM 75 MCG: 75 TABLET ORAL at 06:17

## 2017-09-20 RX ADMIN — DIPHENHYDRAMINE HYDROCHLORIDE 50 MG: 50 INJECTION, SOLUTION INTRAMUSCULAR; INTRAVENOUS at 19:10

## 2017-09-20 RX ADMIN — ASPIRIN 325 MG: 325 TABLET ORAL at 09:26

## 2017-09-20 RX ADMIN — PANTOPRAZOLE SODIUM 40 MG: 40 INJECTION, POWDER, FOR SOLUTION INTRAVENOUS at 21:05

## 2017-09-20 RX ADMIN — Medication 250 MG: at 09:26

## 2017-09-20 RX ADMIN — LIOTHYRONINE SODIUM 5 MCG: 5 TABLET ORAL at 06:17

## 2017-09-20 RX ADMIN — DIPHENHYDRAMINE HYDROCHLORIDE 50 MG: 50 INJECTION, SOLUTION INTRAMUSCULAR; INTRAVENOUS at 23:10

## 2017-09-20 RX ADMIN — OXYCODONE HYDROCHLORIDE 5 MG: 5 TABLET ORAL at 23:10

## 2017-09-20 RX ADMIN — TRAMADOL HYDROCHLORIDE 50 MG: 50 TABLET, COATED ORAL at 19:10

## 2017-09-20 RX ADMIN — RIFAXIMIN 550 MG: 550 TABLET ORAL at 21:05

## 2017-09-20 NOTE — PLAN OF CARE
DISCHARGE PLANNING     Discharge to home or other facility with appropriate resources Progressing        DISCHARGE PLANNING - CARE MANAGEMENT     Discharge to post-acute care or home with appropriate resources Progressing        GASTROINTESTINAL - ADULT     Minimal or absence of nausea and/or vomiting Progressing     Maintains or returns to baseline bowel function Progressing     Maintains adequate nutritional intake Progressing        INFECTION - ADULT     Absence or prevention of progression during hospitalization Progressing     Absence of fever/infection during neutropenic period Progressing        Knowledge Deficit     Patient/family/caregiver demonstrates understanding of disease process, treatment plan, medications, and discharge instructions Progressing        Nutrition/Hydration-ADULT     Nutrient/Hydration intake appropriate for improving, restoring or maintaining nutritional needs Progressing        PAIN - ADULT     Verbalizes/displays adequate comfort level or baseline comfort level Progressing        Potential for Falls     Patient will remain free of falls Progressing        SAFETY ADULT     Maintain or return to baseline ADL function Progressing     Maintain or return mobility status to optimal level Progressing

## 2017-09-20 NOTE — PROGRESS NOTES
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  - s/p ERCP on 9/16 which shoved open sphincterotomy done before  - patient was suggested [facility] as point of care but she soon found that center does not provide the suggested Oddi pressure measurement  - as per patient Dr Amanda Spears was looking for facility in [place] that could have provided this------> communicate with GI team-------> [phone] and left a message for RN coordinator with my work phone to ask which services are provided there and maybe make a referral  - elavil d/c after hives last night------> checked with pharmacy a formulation corn starch free can be found as OP researching www  [email or web address]------> will leave in AVS  - GI suggested TCA with psych consult, but since found gluten free alternative, consult was d/c  - FU with GI as OP  - warming PAD helping with pain, will continue the same     Elavil allergic reaction  Corn starch allergy  - c/w benadryl IV for another 24 hrs  - switch then to PO  - patient to be on 50 mg prednisone PO home for multiple allergies to be taper by 10 mg Q7day-----> after IV solumedrol at beginning this was stopped and unclear why PO held-----> restarted as per prior schedule     Febrile illness  - started on zosyn on admission  - all Cx negative and low suspicion for colitis  - d/c zosyn # 10  - monitor off abx  - WBC WNL, remains afebrile     Reported UC diagnosis  - not proven on colonoscopy and biopsies done by our GI group  - monitor for symptoms     Hypophosphatemia  - supplement given  - repeat in am     VTE Pharmacologic Prophylaxis: yes  Pharmacologic: Enoxaparin (Lovenox)  Mechanical VTE Prophylaxis in Place:  Yes     Patient Centered Rounds: I have performed bedside rounds with nursing staff today      Discussions with Specialists or Other Care Team Provider: GI     Education and Discussions with Family / Patient: patient, declined call at home     Time Spent for Care: 1 hour  More than 50% of total time spent on counseling and coordination of care as described above      Current Length of Stay: 10 day(s)     Current Patient Status: Inpatient   Certification Statement: The patient will continue to require additional inpatient hospital stay due to please refer to above plan     Discharge Plan: home in 1-2 days     Code Status: Level 1 - Full Code    Subjective:   Patient denies complains today  States that IV benadryl Q4 is helping    Objective:     Vitals:   Temp (24hrs), Av 2 °F (36 8 °C), Min:97 9 °F (36 6 °C), Max:98 5 °F (36 9 °C)    HR:  [75-86] 75  Resp:  [18] 18  BP: ()/(55-68) 105/68  SpO2:  [95 %-97 %] 95 %  Body mass index is 22 79 kg/m²  Input and Output Summary (last 24 hours): Intake/Output Summary (Last 24 hours) at 17 1538  Last data filed at 17 1428   Gross per 24 hour   Intake          2420 33 ml   Output              700 ml   Net          1720 33 ml       Physical Exam:     Physical Exam   Constitutional: She is oriented to person, place, and time  She appears well-developed  Cardiovascular: Normal rate, regular rhythm and normal heart sounds  Exam reveals no friction rub  No murmur heard  Pulmonary/Chest: Effort normal and breath sounds normal  No respiratory distress  She has no wheezes  She has no rales  Abdominal: Soft  Bowel sounds are normal  She exhibits no distension  There is no tenderness  There is no rebound  Musculoskeletal: She exhibits no edema  Neurological: She is alert and oriented to person, place, and time  She exhibits normal muscle tone  Psychiatric: She has a normal mood and affect         Additional Data:     Labs:      Results from last 7 days  Lab Units 17  0508   WBC Thousand/uL 7 16   HEMOGLOBIN g/dL 13 2   HEMATOCRIT % 41 3   PLATELETS Thousands/uL 316   NEUTROS PCT % 55   LYMPHS PCT % 31   MONOS PCT % 8   EOS PCT % 5       Results from last 7 days  Lab Units 09/20/17  0508   SODIUM mmol/L 141   POTASSIUM mmol/L 3 7   CHLORIDE mmol/L 105   CO2 mmol/L 29   BUN mg/dL 11   CREATININE mg/dL 0 76   CALCIUM mg/dL 8 3   GLUCOSE RANDOM mg/dL 82           * I Have Reviewed All Lab Data Listed Above  * Additional Pertinent Lab Tests Reviewed: All Labs Within Last 24 Hours Reviewed    Imaging:    Imaging Reports Reviewed Today Include: none  Imaging Personally Reviewed by Myself Includes:  none    Recent Cultures (last 7 days):           Last 24 Hours Medication List:     aspirin 325 mg Oral Daily   enoxaparin 40 mg Subcutaneous Daily   levothyroxine 75 mcg Oral Early Morning   liothyronine 5 mcg Oral Early Morning   pantoprazole 40 mg Intravenous Q12H [address]   predniSONE 50 mg Oral Daily   rifaximin 550 mg Oral Q8H [address]   saccharomyces boulardii 250 mg Oral BID        Today, Patient Was Seen By: Debora Avitia MD    ** Please Note: Dragon 360 Dictation voice to text software may have been used in the creation of this document   **

## 2017-09-20 NOTE — CASE MANAGEMENT
PLEASE UPDATE AUTHORIZATION INFORMATION WITH NEXT REVIEW DATE IN [place] ASAP - THANKS ! [facility] in the [facility] by [facility] Alberto Conner for 2017  Network Utilization Review Department  Phone: [phone]; Fax [phone]  ATTENTION: The Network Utilization Review Department is now centralized for our 7 Facilities  Make a note that we have a new phone and fax numbers for our Department  Please call with any questions or concerns to [phone] and carefully follow the prompts so that you are directed to the right person  All voicemails are confidential  Fax any determinations, approvals, denials, and requests for initial or continue stay review clinical to [phone]  Due to HIGH CALL volume, it would be easier if you could please send faxed requests to expedite your requests and in part, help us provide discharge notifications faster        Continued Stay Review    Date:  9/20/17 Wednesday ACUTE MED SURG LEVEL OF CARE    Vital Signs: /68   Pulse 75   Temp 98 5 °F (36 9 °C) (Oral)   Resp 18   Ht 5' 5 25" (1 657 m)   Wt 62 6 kg (138 lb)   SpO2 95%   BMI 22 79 kg/m²      Intake/Output Summary (Last 24 hours) at 09/20/17 82766    Gross per 24 hour   Intake          1755 33 ml   Output              700 ml   Net          1055 33 ml       Diet Regular; Regular House    IV ACCESS    Medications:   Scheduled Meds:   aspirin 325 mg Oral Daily   enoxaparin 40 mg Subcutaneous Daily   levothyroxine 75 mcg Oral Early Morning   liothyronine 5 mcg Oral Early Morning   pantoprazole 40 mg Intravenous Q12H [address]   potassium phosphate 21 mmol Intravenous Once   predniSONE 50 mg Oral Daily   rifaximin 550 mg Oral Q8H [address]   saccharomyces boulardii 250 mg Oral BID     PRN Meds:     acetaminophen    IV diphenhydrAMINE 50 mg q4hrs prn given x 5/ 24 hrs    hyoscyamine    LORazepam    IV ondansetron 4 mg q6hrs prn given x 2/ 24 hrs    oxyCODONE 5 mg q4hrs prn give x 2/ 24 hrs    traMADol    Abnormal Labs/Diagnostic Results:   CBC   Lab Results   Component Value Date     WBC 7 16 09/20/2017     HGB 13 2 09/20/2017     HCT 41 3 09/20/2017     MCV 99 (H) 09/20/2017      09/20/2017     MCH 31 6 09/20/2017     MCHC 32 0 09/20/2017     RDW 13 2 09/20/2017     MPV 9 4 09/20/2017     NRBC 0 09/20/2017   ,         CMP:    Component Value Date      09/20/2017     K 3 7 09/20/2017      09/20/2017     CO2 29 09/20/2017     ANIONGAP 7 09/20/2017     BUN 11 09/20/2017     CREATININE 0 76 09/20/2017     GLUCOSE 82 09/20/2017     CALCIUM 8 3 09/20/2017     EGFR 96 09/20/2017           Lab Results   Component Value Date     PHOS 2 1 (L) 09/20/2017        Age/Sex: 38 y o  female        Assessment/Plan:  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  - s/p ERCP on 9/16 which shoved open sphincterotomy done before  - patient was suggested [facility] as point of care but she soon found that center does not provide the suggested Oddi pressure measurement  - as per patient Dr Ranjeet Siegel was looking for facility in [place] that could have provided this------> communicate with GI team  - elavil d/c after hives last night------> checked with pharmacy a formulation corn starch free can be found as OP researching www  [email or web address] [email or web address]  - this was communicated with patient  - GI suggested [facility]-----> they will d/w patient as she will need for this encounter with psych-----> will follow outcome     Elavil allergic reaction  Corn starch allergy  - c/w benadryl IV for another 24-48 hrs  - switch then to PO     Febrile illness  - started on zosyn  - all Cx negative and low suspicion for colitis  - d/c zosyn # 10  - monitor off abx  - cbc in am     Reported UC diagnosis  - not proven on colonoscopy and biopsies done by our GI group  - monitor for symptoms      VTE Pharmacologic Prophylaxis: yes  Pharmacologic: Enoxaparin (Lovenox)  Mechanical VTE Prophylaxis in Place:  Yes     Current Patient Status: Inpatient   Certification Statement: The patient will continue to require additional inpatient hospital stay due to please refer to above plan      Discharge Plan:   [address] 49     CASE MANAGEMENT FOLLOWING CLOSELY FOR ALL DISCHARGE NEEDS

## 2017-09-20 NOTE — PROGRESS NOTES
[facility]  Progress Note - Radha Maldonado 38 y o  female MRN: [id number]    [facility] Encounter: [id number]    Assessment/Plan:  #1   Right upper quadrant abdominal pain                        -Likely functional in nature however this has been challenging to treat given her multiple allergies/sensativities                        -Outpatient initiation of Elavil that is corn starch free  We will hold off on psych consult given that this is a viable option for her                          -Continue Xifaxan, Florastor and Levsin as needed                         -Low FODMAP diet                        -Recommend ambulation as tolerated    -Follow up in the office                          Subjective:   Patient seen and examined at bedside  She reports that she has a headache and back pain today  She is tolerating her diet  She denies any new or worsening symptoms  Objective:     Vitals: Blood pressure 105/68, pulse 75, temperature 98 5 °F (36 9 °C), temperature source Oral, resp  rate 18, height 5' 5 25" (1 657 m), weight 62 6 kg (138 lb), SpO2 95 %, not currently breastfeeding  ,Body mass index is 22 79 kg/m²  Intake/Output Summary (Last 24 hours) at 09/20/17 0950  Last data filed at 09/20/17 0900   Gross per 24 hour   Intake          1755 33 ml   Output              700 ml   Net          1055 33 ml       Review of Systems: as per HPI  Review of Systems   Constitutional: Negative for activity change, appetite change, chills, fatigue, fever and unexpected weight change  HENT: Negative for mouth sores, sore throat and trouble swallowing  Gastrointestinal: Positive for abdominal pain  Negative for abdominal distention, blood in stool, constipation, diarrhea, nausea, rectal pain and vomiting  Musculoskeletal: Positive for back pain  Skin: Negative for color change, pallor, rash and wound  Neurological: Positive for headaches  Negative for tremors and syncope     All other systems reviewed and are negative  Physical Exam:     Physical Exam   Constitutional: She is oriented to person, place, and time  She appears well-developed and well-nourished  No distress  HENT:   Head: Normocephalic and atraumatic  Eyes: Right eye exhibits no discharge  Left eye exhibits no discharge  No scleral icterus  Neck: Neck supple  No tracheal deviation present  Cardiovascular: Normal rate, regular rhythm and normal heart sounds  Exam reveals no gallop and no friction rub  No murmur heard  Pulmonary/Chest: Effort normal and breath sounds normal  No respiratory distress  She has no wheezes  She has no rales  Abdominal: Soft  Bowel sounds are normal  She exhibits no distension and no mass  There is tenderness  There is no rebound and no guarding  Neurological: She is alert and oriented to person, place, and time  Skin: Skin is warm and dry  Psychiatric: She exhibits a depressed mood           Invasive Devices     Peripheral Intravenous Line            Peripheral IV 09/17/17 Left Antecubital 2 days                        CBC: Lab Results   Component Value Date    WBC 7 16 09/20/2017    HGB 13 2 09/20/2017    HCT 41 3 09/20/2017    MCV 99 (H) 09/20/2017     09/20/2017    MCH 31 6 09/20/2017    MCHC 32 0 09/20/2017    RDW 13 2 09/20/2017    MPV 9 4 09/20/2017    NRBC 0 09/20/2017   ,   CMP: Lab Results   Component Value Date     09/20/2017    K 3 7 09/20/2017     09/20/2017    CO2 29 09/20/2017    ANIONGAP 7 09/20/2017    BUN 11 09/20/2017    CREATININE 0 76 09/20/2017    GLUCOSE 82 09/20/2017    CALCIUM 8 3 09/20/2017    EGFR 96 09/20/2017   ,   Lipase: No results found for: LIPASE,  PT/INR: No results found for: PT, INR,   Troponin: No results found for: TROPONINI,   Magnesium: No components found for: MAG,   Phosphorous:   Lab Results   Component Value Date    PHOS 2 1 (L) 09/20/2017

## 2017-09-21 LAB
ANION GAP SERPL CALCULATED.3IONS-SCNC: 9 MMOL/L (ref 4–13)
BASOPHILS # BLD AUTO: 0.02 THOUSANDS/ΜL (ref 0–0.1)
BASOPHILS NFR BLD AUTO: 0 % (ref 0–1)
BUN SERPL-MCNC: 10 MG/DL (ref 5–25)
C3 SERPL-MCNC: 136 MG/DL (ref 90–180)
C4 SERPL-MCNC: 26 MG/DL (ref 10–40)
CALCIUM SERPL-MCNC: 8.6 MG/DL (ref 8.3–10.1)
CHLORIDE SERPL-SCNC: 104 MMOL/L (ref 100–108)
CO2 SERPL-SCNC: 25 MMOL/L (ref 21–32)
CREAT SERPL-MCNC: 0.59 MG/DL (ref 0.6–1.3)
CRP SERPL QL: <3 MG/L
EOSINOPHIL # BLD AUTO: 0.02 THOUSAND/ΜL (ref 0–0.61)
EOSINOPHIL NFR BLD AUTO: 0 % (ref 0–6)
ERYTHROCYTE [DISTWIDTH] IN BLOOD BY AUTOMATED COUNT: 13.1 % (ref 11.6–15.1)
ERYTHROCYTE [SEDIMENTATION RATE] IN BLOOD: 13 MM/HOUR (ref 0–20)
GFR SERPL CREATININE-BSD FRML MDRD: 112 ML/MIN/1.73SQ M
GLUCOSE SERPL-MCNC: 94 MG/DL (ref 65–140)
HCT VFR BLD AUTO: 40.3 % (ref 34.8–46.1)
HGB BLD-MCNC: 13.3 G/DL (ref 11.5–15.4)
IGA SERPL-MCNC: 211 MG/DL (ref 70–400)
IGG SERPL-MCNC: 1010 MG/DL (ref 700–1600)
IGM SERPL-MCNC: 66 MG/DL (ref 40–230)
LYMPHOCYTES # BLD AUTO: 1.3 THOUSANDS/ΜL (ref 0.6–4.47)
LYMPHOCYTES NFR BLD AUTO: 12 % (ref 14–44)
MAGNESIUM SERPL-MCNC: 2.4 MG/DL (ref 1.6–2.6)
MCH RBC QN AUTO: 31.8 PG (ref 26.8–34.3)
MCHC RBC AUTO-ENTMCNC: 33 G/DL (ref 31.4–37.4)
MCV RBC AUTO: 96 FL (ref 82–98)
MONOCYTES # BLD AUTO: 0.65 THOUSAND/ΜL (ref 0.17–1.22)
MONOCYTES NFR BLD AUTO: 6 % (ref 4–12)
NEUTROPHILS # BLD AUTO: 8.95 THOUSANDS/ΜL (ref 1.85–7.62)
NEUTS SEG NFR BLD AUTO: 82 % (ref 43–75)
NRBC BLD AUTO-RTO: 0 /100 WBCS
PHOSPHATE SERPL-MCNC: 2 MG/DL (ref 2.7–4.5)
PLATELET # BLD AUTO: 327 THOUSANDS/UL (ref 149–390)
PMV BLD AUTO: 9.5 FL (ref 8.9–12.7)
POTASSIUM SERPL-SCNC: 3.9 MMOL/L (ref 3.5–5.3)
RBC # BLD AUTO: 4.18 MILLION/UL (ref 3.81–5.12)
SODIUM SERPL-SCNC: 138 MMOL/L (ref 136–145)
WBC # BLD AUTO: 10.97 THOUSAND/UL (ref 4.31–10.16)

## 2017-09-21 PROCEDURE — 85652 RBC SED RATE AUTOMATED: CPT | Performed by: INTERNAL MEDICINE

## 2017-09-21 PROCEDURE — 85732 THROMBOPLASTIN TIME PARTIAL: CPT | Performed by: INTERNAL MEDICINE

## 2017-09-21 PROCEDURE — 86147 CARDIOLIPIN ANTIBODY EA IG: CPT | Performed by: INTERNAL MEDICINE

## 2017-09-21 PROCEDURE — 86038 ANTINUCLEAR ANTIBODIES: CPT | Performed by: INTERNAL MEDICINE

## 2017-09-21 PROCEDURE — 85025 COMPLETE CBC W/AUTO DIFF WBC: CPT | Performed by: INTERNAL MEDICINE

## 2017-09-21 PROCEDURE — 82785 ASSAY OF IGE: CPT | Performed by: INTERNAL MEDICINE

## 2017-09-21 PROCEDURE — 86162 COMPLEMENT TOTAL (CH50): CPT | Performed by: INTERNAL MEDICINE

## 2017-09-21 PROCEDURE — 86140 C-REACTIVE PROTEIN: CPT | Performed by: INTERNAL MEDICINE

## 2017-09-21 PROCEDURE — C9113 INJ PANTOPRAZOLE SODIUM, VIA: HCPCS | Performed by: HOSPITALIST

## 2017-09-21 PROCEDURE — 84100 ASSAY OF PHOSPHORUS: CPT | Performed by: INTERNAL MEDICINE

## 2017-09-21 PROCEDURE — 82784 ASSAY IGA/IGD/IGG/IGM EACH: CPT | Performed by: INTERNAL MEDICINE

## 2017-09-21 PROCEDURE — 84165 PROTEIN E-PHORESIS SERUM: CPT | Performed by: INTERNAL MEDICINE

## 2017-09-21 PROCEDURE — 85705 THROMBOPLASTIN INHIBITION: CPT | Performed by: INTERNAL MEDICINE

## 2017-09-21 PROCEDURE — 85670 THROMBIN TIME PLASMA: CPT | Performed by: INTERNAL MEDICINE

## 2017-09-21 PROCEDURE — 85613 RUSSELL VIPER VENOM DILUTED: CPT | Performed by: INTERNAL MEDICINE

## 2017-09-21 PROCEDURE — 80048 BASIC METABOLIC PNL TOTAL CA: CPT | Performed by: INTERNAL MEDICINE

## 2017-09-21 PROCEDURE — 86225 DNA ANTIBODY NATIVE: CPT | Performed by: INTERNAL MEDICINE

## 2017-09-21 PROCEDURE — 83735 ASSAY OF MAGNESIUM: CPT | Performed by: INTERNAL MEDICINE

## 2017-09-21 PROCEDURE — 86200 CCP ANTIBODY: CPT | Performed by: INTERNAL MEDICINE

## 2017-09-21 PROCEDURE — 86146 BETA-2 GLYCOPROTEIN ANTIBODY: CPT | Performed by: INTERNAL MEDICINE

## 2017-09-21 PROCEDURE — 86255 FLUORESCENT ANTIBODY SCREEN: CPT | Performed by: INTERNAL MEDICINE

## 2017-09-21 PROCEDURE — 86235 NUCLEAR ANTIGEN ANTIBODY: CPT | Performed by: INTERNAL MEDICINE

## 2017-09-21 PROCEDURE — 86430 RHEUMATOID FACTOR TEST QUAL: CPT | Performed by: INTERNAL MEDICINE

## 2017-09-21 PROCEDURE — 86160 COMPLEMENT ANTIGEN: CPT | Performed by: INTERNAL MEDICINE

## 2017-09-21 RX ADMIN — POTASSIUM PHOSPHATE, MONOBASIC AND POTASSIUM PHOSPHATE, DIBASIC 21 MMOL: 224; 236 INJECTION, SOLUTION INTRAVENOUS at 10:35

## 2017-09-21 RX ADMIN — DIPHENHYDRAMINE HYDROCHLORIDE 50 MG: 50 INJECTION, SOLUTION INTRAMUSCULAR; INTRAVENOUS at 03:27

## 2017-09-21 RX ADMIN — OXYCODONE HYDROCHLORIDE 5 MG: 5 TABLET ORAL at 13:58

## 2017-09-21 RX ADMIN — TRAMADOL HYDROCHLORIDE 50 MG: 50 TABLET, COATED ORAL at 17:19

## 2017-09-21 RX ADMIN — LIOTHYRONINE SODIUM 5 MCG: 5 TABLET ORAL at 06:38

## 2017-09-21 RX ADMIN — OXYCODONE HYDROCHLORIDE 5 MG: 5 TABLET ORAL at 03:28

## 2017-09-21 RX ADMIN — LORAZEPAM 1 MG: 1 TABLET ORAL at 09:35

## 2017-09-21 RX ADMIN — Medication 250 MG: at 17:19

## 2017-09-21 RX ADMIN — OXYCODONE HYDROCHLORIDE 5 MG: 5 TABLET ORAL at 21:39

## 2017-09-21 RX ADMIN — DIPHENHYDRAMINE HYDROCHLORIDE 50 MG: 50 INJECTION, SOLUTION INTRAMUSCULAR; INTRAVENOUS at 21:34

## 2017-09-21 RX ADMIN — LORAZEPAM 1 MG: 1 TABLET ORAL at 21:34

## 2017-09-21 RX ADMIN — RIFAXIMIN 550 MG: 550 TABLET ORAL at 21:34

## 2017-09-21 RX ADMIN — ONDANSETRON 4 MG: 2 INJECTION INTRAMUSCULAR; INTRAVENOUS at 13:58

## 2017-09-21 RX ADMIN — PREDNISONE 50 MG: 20 TABLET ORAL at 09:31

## 2017-09-21 RX ADMIN — DIPHENHYDRAMINE HYDROCHLORIDE 50 MG: 50 INJECTION, SOLUTION INTRAMUSCULAR; INTRAVENOUS at 07:39

## 2017-09-21 RX ADMIN — LEVOTHYROXINE SODIUM 75 MCG: 75 TABLET ORAL at 06:38

## 2017-09-21 RX ADMIN — ENOXAPARIN SODIUM 40 MG: 40 INJECTION SUBCUTANEOUS at 09:32

## 2017-09-21 RX ADMIN — RIFAXIMIN 550 MG: 550 TABLET ORAL at 06:38

## 2017-09-21 RX ADMIN — OXYCODONE HYDROCHLORIDE 5 MG: 5 TABLET ORAL at 07:39

## 2017-09-21 RX ADMIN — RIFAXIMIN 550 MG: 550 TABLET ORAL at 13:55

## 2017-09-21 RX ADMIN — PANTOPRAZOLE SODIUM 40 MG: 40 INJECTION, POWDER, FOR SOLUTION INTRAVENOUS at 09:35

## 2017-09-21 RX ADMIN — DIPHENHYDRAMINE HYDROCHLORIDE 50 MG: 50 INJECTION, SOLUTION INTRAMUSCULAR; INTRAVENOUS at 12:30

## 2017-09-21 RX ADMIN — DIPHENHYDRAMINE HYDROCHLORIDE 50 MG: 50 INJECTION, SOLUTION INTRAMUSCULAR; INTRAVENOUS at 17:19

## 2017-09-21 RX ADMIN — Medication 250 MG: at 09:31

## 2017-09-21 RX ADMIN — PANTOPRAZOLE SODIUM 40 MG: 40 INJECTION, POWDER, FOR SOLUTION INTRAVENOUS at 21:34

## 2017-09-21 RX ADMIN — ASPIRIN 325 MG: 325 TABLET ORAL at 09:31

## 2017-09-21 NOTE — MEDICAL STUDENT
MEDICAL STUDENT  Inpatient H&P for TRAINING ONLY   Not Part of Legal Medical Record       H&P Exam - Lesley Lr 38 y o  female MRN: [id number]    Unit/Bed#: [facility] 823-01 Encounter: [id number]    Assessment:  Patient is a 38year old female with a PMHx of pancreatitis, GERD, INDERJIT, Asthma, anxiety, hypothyroidism, and sphincter of Oddi dysfunction who presented to [facility] on 9/9 with RUQ pain, nausea, fever (Tmax 101), and bloody BM  She was found to have leukocytosis, and CT showed pnuemobilia with hepatic duct dilation, and she was transferred to [facility] for further management  Rheumatology was consulted for evaluation for possible lupus  Her history of muscle weakness and joint pain/stiffness may be secondary to lupus, RA, myositis  Plan:  1  Follow up on the following labs: COREY, RF, ESR, and CRP  2  Check the following labs: dsDNA, anti-smith AB, CCP, LENA, C3, C4, SSA/SSB  3  Given history of 1st trimester miscarriage and possible lupus, consider checking labs for APS: anticardiolipin, Beta2-glycoprotein, lupus anticoagulant   4  Consider follow up with rheumatology as an outpatient  History of Present Illness   Patient is a 38year old female with a PMHx of pancreatitis, GERD, INDERJIT, Asthma, anxiety, hypothyroidism, and sphincter of Oddi dysfunction who presented to [facility] on 9/9 with RUQ pain, nausea, fever (Tmax 101), and bloody BM  She was found to have leukocytosis, and CT showed pnuemobilia with hepatic duct dilation, and she was transferred to [facility] for further management  She was recently admitted to [address] (8/24-8/30) with similar symptoms, found to be in septic shock 2/2 colitis with hypotension, and treated with IV cipro and flagyl  Her stay was complicated by development of heart failure, and subsequent NSTEMI  She was discharged at that time on a steroid taper and augmentin  During her current admission, she had an ERCP (9/15) and MRI (9/13) that showed dilated CBD to 1 3 cm   She was treated for her abdominal pain with zosyn (9/10-9/19) and solumedrol 20 mg IV (9/10-9/13)  Her blood and stool cultures have been negative throughout this hospitalization  On 9/12 she also had a colonoscopy and EGD, which showed no abnormalities, and biopsies were significant only for mild gastritis  Patient reported a history of UC, which was not seen on pathology  She was treated for flares of her GI issues by her PCP Dr Estela Morrison on and off with prednisone for 20 years  Since December of 2016, she was started on 5 mg prednisone daily  She was put on a larger dose of prednisone during her last admission to [facility]  During this admission, she was given solumedrol 20 mg started on prednisone 50 mg on 9/20  Rheumatology was consulted for evaluation for possible lupus  Patient saw Dr Juan David Gardiner in 2003 because of  "fatigue, memory issues, and speech issues", and recalls positive RF in the past for 8 years  At that time, she had an MRI that showed changes suspicious for MS, but this diagnosis was not confirmed, and no treatment was initiated  She was never diagnosed with RA, or other rheumatological disease  Dr Juan David Gardiner mentioned that she had fibromyalgia, and that many of her symptoms were associated with that diagnosis  She was never treated for fibromyalgia with duloxetine, lyrica, savella  She does state that she has some hair thinning (since 2013), dry eyes, dry mouth, painful hands, and morning stiffness for 20 min (in hands, hips and back)  She does get a rash over her chest, which she describes as urticarial, not related to sun exposure  She is sun sensitive, and get a rash of white dots on her legs after 15 min in the sun  She has some difficulty with combing her hair because she feels her muscles are weak  She does not have trouble rising out of a chair, no temporal headache, no jaw claudication symptoms  She denies Raynaud's symptoms   She denies hot, swollen joints, but states that she has non-painful swelling in her ankles sometimes  She also states that she lost 20 pounds since Dec 2016  Of note, family history is significant for thyroid disease (autn, grandmother, and mother), and psoriasis (daughter, aunt)  She does not smoke, drink alcohol, or use drugs  She does have 5 years of exposure to pesticides in the past  She has had 4 pregnancies, 3 children and 1 miscarriage in the 1st trimester       ROS: History obtained from chart review and the patient  General ROS: positive for  - fatigue  negative for - chills, fever or night sweats  Psychological ROS: positive for - depression, anxiety  Ophthalmic ROS: positive for - decreased vision  negative for - blurry vision, double vision, dry eyes, excessive tearing, eye pain, itchy eyes, loss of vision or photophobia  ENT ROS: negative  Allergy and Immunology ROS: positive for - hives  negative for - nasal congestion or postnasal drip  Hematological and Lymphatic ROS: negative  Endocrine ROS: negative  Respiratory ROS: no cough, shortness of breath, or wheezing  Cardiovascular ROS: no chest pain or dyspnea on exertion  Gastrointestinal ROS: positive for - abdominal pain, appetite loss, blood in stools and diarrhea  negative for - constipation  Genito-Urinary ROS: no dysuria, trouble voiding, or hematuria  Musculoskeletal ROS: positive for - muscular weakness  negative for - joint swelling  Neurological ROS: no TIA or stroke symptoms  Dermatological ROS: negative    Historical Information   Past Medical History:   Diagnosis Date    Anxiety     Asthma     Chronic kidney disease     Deep vein thrombosis     Disease of thyroid gland     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    Generalized anxiety disorder 8/26/2017    GERD (gastroesophageal reflux disease)     Heart murmur     Mast cell activation     Migraine     Pancreatitis     sphinger of     RA (rheumatoid arthritis)      Past Surgical History:   Procedure Laterality Date    APPENDECTOMY      CHOLECYSTECTOMY      EGD AND COLONOSCOPY N/A 9/12/2017    Procedure: EGD AND COLONOSCOPY;  Surgeon: Kevyn Covarrubias MD;  Location: [facility]; Service: Gastroenterology    ERCP N/A 9/15/2017    Procedure: ENDOSCOPIC RETROGRADE CHOLANGIOPANCREATOGRAPHY (ERCP); Surgeon: Hallie Ramirez MD;  Location: [facility]; Service: Gastroenterology    HYSTERECTOMY      KNEE SURGERY Right     LAPAROSCOPIC ENDOMETRIOSIS FULGURATION      ORIF ANKLE FRACTURE Left     MO KNEE SCOPE,MED/LAT MENISECTOMY Left 5/12/2017    Procedure: POSSIBLE MEDIAL MENISECTOMY;  Surgeon: Jorge Mora MD;  Location: [facility];  Service: Orthopedics    MO KNEE [address] CART Left 5/12/2017    Procedure: KNEE ARTHROSCOPY EVALUATION, CHONDROPLASTY;  Surgeon: Jorge Mora MD;  Location: [facility];  Service: Orthopedics     Social History   History   Alcohol Use No     History   Drug Use No     History   Smoking Status    Never Smoker   Smokeless Tobacco    Never Used     Family History: History reviewed  No pertinent family history      Meds/Allergies   all medications and allergies reviewed  Allergies   Allergen Reactions    Aspergillus Fumigatus Other (See Comments), Hives and Swelling    Imitrex [Sumatriptan] Anaphylaxis     Bradycardia, sob, back pressure, head pain,throat tightness    Corn Dextrin     Imuran [Azathioprine] Other (See Comments)     pancreatitis    Sulfa Antibiotics Hives       Objective   First Vitals:   Blood Pressure: 127/76 (09/10/17 0328)  Pulse: 67 (09/10/17 0328)  Temperature: 99 °F (37 2 °C) (09/10/17 0328)  Temp Source: Oral (09/10/17 0328)  Respirations: 18 (09/10/17 0328)  Height: 5' 5" (165 1 cm) (09/10/17 0328)  Weight - Scale: 63 5 kg (140 lb) (09/10/17 0328)  SpO2: 100 % (09/10/17 0328)    Current Vitals:   Blood Pressure: 104/65 (09/21/17 0735)  Pulse: 78 (09/21/17 0735)  Temperature: 98 °F (36 7 °C) (09/21/17 0735)  Temp Source: Oral (09/21/17 0735)  Respirations: 18 (09/21/17 4929)  Height: 5' 5 25" (165 7 cm) (09/15/17 1431)  Weight - Scale: 62 6 kg (138 lb) (09/15/17 1431)  SpO2: 96 % (09/21/17 0735)      Intake/Output Summary (Last 24 hours) at 09/21/17 1533  Last data filed at 09/21/17 1300   Gross per 24 hour   Intake              520 ml   Output             1300 ml   Net             -780 ml       Invasive Devices     Peripheral Intravenous Line            Peripheral IV 09/20/17 Left Forearm 1 day                Physical Exam: Physical Exam   Constitutional: She is oriented to person, place, and time  She appears well-developed and well-nourished  HENT:   Head: Normocephalic and atraumatic  No parotid gland enlargement noted  Eyes: Conjunctivae and EOM are normal  Pupils are equal, round, and reactive to light  Right eye exhibits no discharge  Left eye exhibits no discharge  No scleral icterus  Neck: Normal range of motion  Neck supple  No thyromegaly present  Cardiovascular: Normal rate and regular rhythm  Murmur heard  Systolic murmur is present with a grade of 2/6   Pulses:       Dorsalis pedis pulses are 2+ on the right side, and 2+ on the left side  Posterior tibial pulses are 2+ on the right side, and 2+ on the left side  Pulmonary/Chest: Effort normal and breath sounds normal  No respiratory distress  She has no wheezes  She has no rhonchi  She has no rales  She exhibits no tenderness  Abdominal: Soft  Bowel sounds are normal  She exhibits no mass  There is tenderness in the right upper quadrant  There is no rebound and no guarding  Musculoskeletal: Normal range of motion  She exhibits no edema, tenderness or deformity  Strength 5/5 in all extremities  L knee limited in ROM due to pain  Swelling and tenderness noted in R 2nd and 3rd MCPs, L 2nd and 3rd MCPs  Neurological: She is alert and oriented to person, place, and time  Skin: Skin is warm and dry           Lab Results:   Lab Results   Component Value Date    WBC 10 97 (H) 09/21/2017 HGB 13 3 09/21/2017    HCT 40 3 09/21/2017    MCV 96 09/21/2017     09/21/2017     Lab Results   Component Value Date    GLUCOSE 94 09/21/2017    CALCIUM 8 6 09/21/2017     09/21/2017    K 3 9 09/21/2017    CO2 25 09/21/2017     09/21/2017    BUN 10 09/21/2017    CREATININE 0 59 (L) 09/21/2017     Lab Results   Component Value Date    ALT 26 09/10/2017    AST 10 09/10/2017    ALKPHOS 45 (L) 09/10/2017    BILITOT 0 76 09/10/2017       Imaging:   CXR: no pulm disease  CT A/P: nonobstructing bilateral renal calculi  CT head: negative  UE dopplers: R superficial thrombophlebitis, no acute DVT in R or L UE

## 2017-09-21 NOTE — PROGRESS NOTES
[facility] Progress Note  Patient: Zofia Vazquez 38 y o  female   MRN: [id number]  PCP: Giovany Schultz PA-C  Unit/Bed#: [address] 823-01 Encounter: [id number]  Date Of Visit: 09/21/17    Assessment/Plan:  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  Recurrent febrile illness w/o clear sources of infection  - today I had a long conversation with Jose Charles  - she had CT scan, MRI and MRCP while in here all with chronic finding of sphincterotomy and CBD dilation that are unchanged and maybe small stone or sludge in gallbladder stump  - she id have EGD/colonoscopy on 9/12 and biopsy findings are for chronic inactive gastritis  - ERCP on 9/16 with widely open Oddi  - zosyn x 10 days with recent 7 days IV abx during prior admission and all negative Cx and transient fever without although pain resolution  - no clinical or radiographic evidence of cholangitis   - this afternoon I spoke with [facility] and d/w him case and at this time is felt that [facility] will offer same care and no new procedures  - pressure at Oddi would not be performed for 2 reasons-----> patient has already sphincterotomy and high risk of severe pancreatitis in women  - at this time, I have offered the patient Rx for amytriptiline that she can have filled as OP w/o cornstarch and I will gladly profile it  - she does understand that [facility] has declined transfer  - she is now telling me that her allergologist, suggested that she might have a form of lupus and she is due to see rheumatology in October-----> at this time send for COREY, RF, ESR, C-RP and consult Dr Adrianne Naik  - patient agreeable to c/w current care and have encounter with rheumatologist  - will follow [facility]     Elavil allergic reaction  Corn starch allergy  - c/w benadryl IV for now  - prednisone taper as started as OP 50 mg for 7 days (#2/7) and then reduce by 10 mg every week     Reported UC diagnosis  - not proven on colonoscopy and biopsies done by our GI group  - monitor for symptoms     Hypophosphatemia  - supplement given  - repeat in am     VTE Pharmacologic Prophylaxis: yes  Pharmacologic: Enoxaparin (Lovenox)  Mechanical VTE Prophylaxis in Place: Yes     Patient Centered Rounds: I have performed bedside rounds with nursing staff today      Discussions with Specialists or Other Care Team Provider: surgery, Dr Vinny Bello at [facility] and Discussions with Family / Lizeth Piedad as outlined above     Time Spent for Care: 1 hour   More than 50% of total time spent on counseling and coordination of care as described above      Current Length of Stay: 11 day(s)     Current Patient Status: Inpatient   Certification Statement: The patient will continue to require additional inpatient hospital stay due to please refer to above plan     Discharge Plan: TBD     Code Status: Level 1 - Full Code    Subjective:   Patient c/o pain and she is frustrated by the fact that we could not find a cause to her pain    Objective:     Vitals:   Temp (24hrs), Av 3 °F (36 8 °C), Min:98 °F (36 7 °C), Max:98 6 °F (37 °C)    HR:  [78-86] 78  Resp:  [16-18] 18  BP: (104-113)/(65-67) 104/65  SpO2:  [95 %-97 %] 96 %  Body mass index is 22 79 kg/m²  Input and Output Summary (last 24 hours): Intake/Output Summary (Last 24 hours) at 17 1356  Last data filed at 17 1300   Gross per 24 hour   Intake              770 ml   Output             1300 ml   Net             -530 ml       Physical Exam:     Physical Exam   Constitutional: She is oriented to person, place, and time  Abdominal: Soft  She exhibits no distension  There is tenderness (mild RUQ)  There is no rebound and no guarding  Musculoskeletal: She exhibits no edema  Neurological: She is alert and oriented to person, place, and time  Psychiatric: She has a normal mood and affect         Additional Data:     Labs:      Results from last 7 days  Lab Units 17  0513   WBC Thousand/uL 10 97* HEMOGLOBIN g/dL 13 3   HEMATOCRIT % 40 3   PLATELETS Thousands/uL 327   NEUTROS PCT % 82*   LYMPHS PCT % 12*   MONOS PCT % 6   EOS PCT % 0       Results from last 7 days  Lab Units 09/21/17  0513   SODIUM mmol/L 138   POTASSIUM mmol/L 3 9   CHLORIDE mmol/L 104   CO2 mmol/L 25   BUN mg/dL 10   CREATININE mg/dL 0 59*   CALCIUM mg/dL 8 6   GLUCOSE RANDOM mg/dL 94           * I Have Reviewed All Lab Data Listed Above  * Additional Pertinent Lab Tests Reviewed: All Labs Within Last 24 Hours Reviewed    Imaging:    Imaging Reports Reviewed Today Include: all images done during this stay  Imaging Personally Reviewed by Myself Includes:  none    Recent Cultures (last 7 days):           Last 24 Hours Medication List:     aspirin 325 mg Oral Daily   enoxaparin 40 mg Subcutaneous Daily   levothyroxine 75 mcg Oral Early Morning   liothyronine 5 mcg Oral Early Morning   pantoprazole 40 mg Intravenous Q12H [address]   potassium phosphate 21 mmol Intravenous Once   predniSONE 50 mg Oral Daily   rifaximin 550 mg Oral Q8H [address]   saccharomyces boulardii 250 mg Oral BID        Today, Patient Was Seen By: Ronnie Dias MD    ** Please Note: Dragon 360 Dictation voice to text software may have been used in the creation of this document   **

## 2017-09-21 NOTE — CONSULTS
Consultation on Gifty Cho    Impression:    1  Complex medical history of recurrent abdominal pain, biliary duct dilation, pancreatitis, recurrent urticaria that lasts more than 24 hours, asthma for the last 5 or 6 years from triggers and remote history of inflammatory bowel disease  Past history of low titer rheumatoid factor, but did not have any objective evidence for inflammatory arthritis when she saw a rheumatologist in 2003  The diagnosis here is really unclear but might suggest hypersensitivity allergies, possibility of urticarial vasculitis, less likely SLE or connective tissue disease  Should consider Sjogren's syndrome ? ? The physical exam today is negative for any cutaneous vasculitis, inflammatory arthritis or other obvious autoimmune condition, possibly due to her high dose of prednisone therapy at 50 mg daily?      2   History of inflammatory bowel going back to the mid 1990s-Crohn's disease diagnosis later changed to ulcerative colitis, but not confirmed when she had a recent colonoscopy in the hospital   She has had episodes of rectal bleeding  Local small bowel biopsies negative for celiac disease negative:     3  Mast cell disorder, as diagnosed by [facility] allergy  This can cause some skin, GI tract symptoms  4   History of asthma with some control with Xolair  Rule out IGE disorder, as high number of allergens as per skin testing and RAST testing through the allergist     5   History of cholecystitis, gallbladder removed and followed by pancreatitis, sphincter of Oddi dysfunction  6   History of positive rheumatoid factor in the past   This is most likely a nonspecific immunological representative of some stimulation of B-cell stimulation for B-cell disorder  No evidence for RA or Sjogren's at this time  Will check serum protein electrophoresis to look for any monoclonal gammopathy      7   Remote history of fibromyalgia but not sure this diagnosis could be made during the hospitalization  8   Hypothyroidism, longstanding  Evaluate for autoimmune thyroiditis or Hashimoto's disease  Recommendation:    1  Obtain extensive autoimmune testing including double-stranded DNA, lupus serologies, phospholipid antibodies, quantitative immunoglobulins including IgE level, complements, CH 50, CCP and thyroid antibodies  2   Since patient seems to be getting better, consider steroid tapering  She may need to go back on Xolair injections her allergy advice  3   In view of her mast cell disorder which is  He has rare, she may need [facility] as referral for further advice on workup and treatment  4   If she does have some positive autoimmune serologies, then I will be glad to see her as an outpatient in the next 4-8 weeks for further evaluation  History of Present Illness         Patient is a 38year old female with a PMHx of pancreatitis, GERD, INDERJIT, Asthma, anxiety, hypothyroidism, and sphincter of Oddi dysfunction who presented to [facility] on 9/9 with RUQ pain, nausea, fever (Tmax 101), and bloody BM  She was found to have leukocytosis, and CT showed pnuemobilia with hepatic duct dilation, and [facility] for further management  She was recently admitted to [facility] (8/24-8/30) with similar symptoms, found to be in septic shock 2/2 colitis with hypotension, and treated with IV cipro and flagyl  Her stay was complicated by development of heart failure, and subsequent NSTEMI  She was discharged at that time on a steroid taper and augmentin       During her current admission, she had an ERCP (9/15) and MRI (9/13) that showed dilated CBD to 1 3 cm  She was treated for her abdominal pain with zosyn (9/10-9/19) and solumedrol 20 mg IV (9/10-9/13)  Her blood and stool cultures have been negative throughout this hospitalization   On 9/12 she also had a colonoscopy and EGD, which showed no abnormalities, and biopsies were significant only for mild gastritis  Patient reported a history of UC, which was not seen on pathology  She was treated for flares of her GI issues by her PCP Dr Warren Roberts on and off with prednisone for 20 years  Since December of 2016, she was started on 5 mg prednisone daily  She was put on a larger dose of prednisone during her last admission to [facility]  During this admission, she was given solumedrol 20 mg started on prednisone 50 mg on 9/20       Rheumatology was consulted for evaluation for possible lupus  Patient saw Dr Guillermo Calloway in 2003 because of  "fatigue, memory issues, and speech issues", and recalls positive RF in the past for 8 years  At that time, she had an MRI that showed changes suspicious for MS, but this diagnosis was not confirmed, and no treatment was initiated  She was never diagnosed with RA, or other rheumatological disease  Dr Guillermo Calloway mentioned that she had fibromyalgia, and that many of her symptoms were associated with that diagnosis  She was never treated for fibromyalgia with duloxetine, lyrica, savella  She does state that she has some hair thinning (since 2013), dry eyes, dry mouth, painful hands, and morning stiffness for 20 min (in hands, hips and back)  She does get a rash over her chest, which she describes as urticarial, not related to sun exposure  She is sun sensitive, and get a rash of white dots on her legs after 15 min in the sun  She has some difficulty with combing her hair because she feels her muscles are weak  She does not have trouble rising out of a chair, no temporal headache, no jaw claudication symptoms  She denies Raynaud's symptoms  She denies hot, swollen joints, but states that she has non-painful swelling in her ankles sometimes  She also states that she lost 20 pounds since Dec 2016       Of note, family history is significant for thyroid disease (autn, grandmother, and mother), and psoriasis (daughter, aunt)  She does not smoke, drink alcohol, or use drugs   She does have 5 years of exposure to pesticides in the past  She has had 4 pregnancies, 3 children and 1 miscarriage in the 1st trimester       ROS: History obtained from chart review and the patient  General ROS: positive for  - fatigue  negative for - chills, fever or night sweats  Psychological ROS: positive for - depression, anxiety  Ophthalmic ROS: positive for - decreased vision  negative for - blurry vision, double vision, dry eyes, excessive tearing, eye pain, itchy eyes, loss of vision or photophobia  ENT ROS: negative  Allergy and Immunology ROS: positive for - hives  negative for - nasal congestion or postnasal drip  Hematological and Lymphatic ROS: negative  Endocrine ROS: negative  Respiratory ROS: no cough, shortness of breath, or wheezing  Cardiovascular ROS: no chest pain or dyspnea on exertion  Gastrointestinal ROS: positive for - abdominal pain, appetite loss, blood in stools and diarrhea  negative for - constipation  Genito-Urinary ROS: no dysuria, trouble voiding, or hematuria  Musculoskeletal ROS: positive for - muscular weakness  negative for - joint swelling  Neurological ROS: no TIA or stroke symptoms  Dermatological ROS: negative     Historical I   nformation      Medical History        Past Medical History:   Diagnosis Date    Anxiety      Asthma      Chronic kidney disease      Deep vein thrombosis      Disease of thyroid gland      Disorder of sphincter of Oddi       with pancreatitis    Generalized anxiety disorder 8/26/2017    GERD (gastroesophageal reflux disease)      Heart murmur      Mast cell activation      Migraine      Pancreatitis       sphinger of     RA (rheumatoid arthritis)           Surgical History         Past Surgical History:   Procedure Laterality Date    APPENDECTOMY        CHOLECYSTECTOMY        EGD AND COLONOSCOPY N/A 9/12/2017     Procedure: EGD AND COLONOSCOPY;  Surgeon: Anton Buchanan MD;  [facility];   Service: Gastroenterology    ERCP N/A 9/15/2017     Procedure: ENDOSCOPIC RETROGRADE CHOLANGIOPANCREATOGRAPHY (ERCP); Surgeon: Yesenia Vanegas MD;  Location: [facility]; Service: Gastroenterology    HYSTERECTOMY        KNEE SURGERY Right      LAPAROSCOPIC ENDOMETRIOSIS FULGURATION        ORIF ANKLE FRACTURE Left      NE KNEE SCOPE,MED/LAT MENISECTOMY Left 5/12/2017     Procedure: POSSIBLE MEDIAL MENISECTOMY;  Surgeon: Dylan Monet MD;  Location: [facility];  Service: Orthopedics    NE KNEE [address] CART Left 5/12/2017     Procedure: KNEE ARTHROSCOPY EVALUATION, CHONDROPLASTY;  Surgeon: Dylan Monet MD;  Location: [facility];  Service: Orthopedics         Social History             History   Alcohol Use No          History   Drug Use No          History   Smoking Status    Never Smoker   Smokeless Tobacco    Never Used      Family History: History reviewed   No pertinent family history      Meds/Allergies   all medications and allergies reviewed        Allergies   Allergen Reactions    Aspergillus Fumigatus Other (See Comments), Hives and Swelling    Imitrex [Sumatriptan] Anaphylaxis       Bradycardia, sob, back pressure, head pain,throat tightness    Corn Dextrin      Imuran [Azathioprine] Other (See Comments)       pancreatitis    Sulfa Antibiotics Hives         Objective   First Vitals:   Blood Pressure: 127/76 (09/10/17 0328)  Pulse: 67 (09/10/17 0328)  Temperature: 99 °F (37 2 °C) (09/10/17 0328)  Temp Source: Oral (09/10/17 0328)  Respirations: 18 (09/10/17 0328)  Height: 5' 5" (165 1 cm) (09/10/17 0328)  Weight - Scale: 63 5 kg (140 lb) (09/10/17 0328)  SpO2: 100 % (09/10/17 0328)     Current Vitals:   Blood Pressure: 104/65 (09/21/17 0735)  Pulse: 78 (09/21/17 0735)  Temperature: 98 °F (36 7 °C) (09/21/17 0735)  Temp Source: Oral (09/21/17 0735)  Respirations: 18 (09/21/17 0735)  Height: 5' 5 25" (165 7 cm) (09/15/17 1431)  Weight - Scale: 62 6 kg (138 lb) (09/15/17 1431)  SpO2: 96 % (09/21/17 0735)        Intake/Output Summary (Last 24 hours) at 09/21/17 1533  Last data filed at 09/21/17 1300    Gross per 24 hour   Intake              520 ml   Output             1300 ml   Net             -780 ml             Invasive Devices            Peripheral Intravenous Line                     Peripheral IV 09/20/17 Left Forearm 1 day                      Physical Exam: Physical Exam   Constitutional: She is oriented to person, place, and time  She appears well-developed and well-nourished  HENT:   Head: Normocephalic and atraumatic  No parotid gland enlargement noted  No oral ulcers  Saliva present  Eyes: Conjunctivae and EOM are normal  Pupils are equal, round, and reactive to light  Right eye exhibits no discharge  Left eye exhibits no discharge  No scleral icterus  Neck: Normal range of motion  Neck supple  No thyromegaly present  Cardiovascular: Normal rate and regular rhythm  Murmur heard  Systolic murmur is present with a grade of 2/6   Pulses:       Dorsalis pedis pulses are 2+ on the right side, and 2+ on the left side  Posterior tibial pulses are 2+ on the right side, and 2+ on the left side  Pulmonary/Chest: Effort normal and breath sounds normal  No respiratory distress  She has no wheezes  She has no rhonchi  She has no rales  She exhibits no tenderness  Abdominal: Soft  Bowel sounds are normal  She exhibits no mass  There is tenderness in the right upper quadrant  There is no rebound and no guarding  Musculoskeletal: Normal range of motion  She exhibits no edema, tenderness or deformity  Strength 5/5 in all extremities  L knee limited in ROM due to pain  No swelling to the wrists, MCPs or PIPs noted  Ankles and MTPs negative  Neurological: She is alert and oriented to person, place, and time  Skin: Skin is warm and dry  No uriticarila lesions seen, no sclerodactyly

## 2017-09-22 LAB
ALBUMIN SERPL ELPH-MCNC: 4.22 G/DL (ref 3.5–5)
ALBUMIN SERPL ELPH-MCNC: 60.3 % (ref 52–65)
ALPHA1 GLOB SERPL ELPH-MCNC: 0.29 G/DL (ref 0.1–0.4)
ALPHA1 GLOB SERPL ELPH-MCNC: 4.2 % (ref 2.5–5)
ALPHA2 GLOB SERPL ELPH-MCNC: 0.81 G/DL (ref 0.4–1.2)
ALPHA2 GLOB SERPL ELPH-MCNC: 11.6 % (ref 7–13)
ANION GAP SERPL CALCULATED.3IONS-SCNC: 8 MMOL/L (ref 4–13)
BASOPHILS # BLD AUTO: 0.04 THOUSANDS/ΜL (ref 0–0.1)
BASOPHILS NFR BLD AUTO: 0 % (ref 0–1)
BETA GLOB ABNORMAL SERPL ELPH-MCNC: 0.42 G/DL (ref 0.4–0.8)
BETA1 GLOB SERPL ELPH-MCNC: 6 % (ref 5–13)
BETA2 GLOB SERPL ELPH-MCNC: 5.4 % (ref 2–8)
BETA2+GAMMA GLOB SERPL ELPH-MCNC: 0.38 G/DL (ref 0.2–0.5)
BUN SERPL-MCNC: 16 MG/DL (ref 5–25)
CALCIUM SERPL-MCNC: 8.5 MG/DL (ref 8.3–10.1)
CARDIOLIPIN IGA SER IA-ACNC: <9 APL U/ML (ref 0–11)
CARDIOLIPIN IGG SER IA-ACNC: <9 GPL U/ML (ref 0–14)
CARDIOLIPIN IGM SER IA-ACNC: <9 MPL U/ML (ref 0–12)
CH50 SERPL-ACNC: 62 U/ML (ref 42–60)
CHLORIDE SERPL-SCNC: 108 MMOL/L (ref 100–108)
CO2 SERPL-SCNC: 24 MMOL/L (ref 21–32)
CREAT SERPL-MCNC: 0.72 MG/DL (ref 0.6–1.3)
DSDNA AB SER-ACNC: 1 IU/ML (ref 0–9)
ENA RNP AB SER-ACNC: 0.8 AI (ref 0–0.9)
ENA SM AB SER-ACNC: <0.2 AI (ref 0–0.9)
ENA SS-A AB SER-ACNC: <0.2 AI (ref 0–0.9)
ENA SS-B AB SER-ACNC: <0.2 AI (ref 0–0.9)
EOSINOPHIL # BLD AUTO: 0.11 THOUSAND/ΜL (ref 0–0.61)
EOSINOPHIL NFR BLD AUTO: 1 % (ref 0–6)
ERYTHROCYTE [DISTWIDTH] IN BLOOD BY AUTOMATED COUNT: 13.4 % (ref 11.6–15.1)
GAMMA GLOB ABNORMAL SERPL ELPH-MCNC: 0.88 G/DL (ref 0.5–1.6)
GAMMA GLOB SERPL ELPH-MCNC: 12.5 % (ref 12–22)
GFR SERPL CREATININE-BSD FRML MDRD: 102 ML/MIN/1.73SQ M
GLUCOSE SERPL-MCNC: 89 MG/DL (ref 65–140)
HCT VFR BLD AUTO: 38.9 % (ref 34.8–46.1)
HGB BLD-MCNC: 13 G/DL (ref 11.5–15.4)
IGG/ALB SER: 1.52 {RATIO} (ref 1.1–1.8)
LYMPHOCYTES # BLD AUTO: 2.57 THOUSANDS/ΜL (ref 0.6–4.47)
LYMPHOCYTES NFR BLD AUTO: 21 % (ref 14–44)
MAGNESIUM SERPL-MCNC: 2.4 MG/DL (ref 1.6–2.6)
MCH RBC QN AUTO: 32.3 PG (ref 26.8–34.3)
MCHC RBC AUTO-ENTMCNC: 33.4 G/DL (ref 31.4–37.4)
MCV RBC AUTO: 97 FL (ref 82–98)
MONOCYTES # BLD AUTO: 1.04 THOUSAND/ΜL (ref 0.17–1.22)
MONOCYTES NFR BLD AUTO: 8 % (ref 4–12)
NEUTROPHILS # BLD AUTO: 8.59 THOUSANDS/ΜL (ref 1.85–7.62)
NEUTS SEG NFR BLD AUTO: 70 % (ref 43–75)
NRBC BLD AUTO-RTO: 0 /100 WBCS
PHOSPHATE SERPL-MCNC: 2.2 MG/DL (ref 2.7–4.5)
PLATELET # BLD AUTO: 293 THOUSANDS/UL (ref 149–390)
PMV BLD AUTO: 9.5 FL (ref 8.9–12.7)
POTASSIUM SERPL-SCNC: 4.1 MMOL/L (ref 3.5–5.3)
PROT PATTERN SERPL ELPH-IMP: NORMAL
PROT SERPL-MCNC: 7 G/DL (ref 6.4–8.2)
RBC # BLD AUTO: 4.02 MILLION/UL (ref 3.81–5.12)
RHEUMATOID FACT SER QL LA: NEGATIVE
RYE IGE QN: NEGATIVE
SODIUM SERPL-SCNC: 140 MMOL/L (ref 136–145)
TOTAL IGE SMQN RAST: 29.8 KU/L (ref 0–113)
WBC # BLD AUTO: 12.38 THOUSAND/UL (ref 4.31–10.16)

## 2017-09-22 PROCEDURE — C9113 INJ PANTOPRAZOLE SODIUM, VIA: HCPCS | Performed by: HOSPITALIST

## 2017-09-22 PROCEDURE — 84100 ASSAY OF PHOSPHORUS: CPT | Performed by: INTERNAL MEDICINE

## 2017-09-22 PROCEDURE — 86800 THYROGLOBULIN ANTIBODY: CPT | Performed by: INTERNAL MEDICINE

## 2017-09-22 PROCEDURE — 85025 COMPLETE CBC W/AUTO DIFF WBC: CPT | Performed by: INTERNAL MEDICINE

## 2017-09-22 PROCEDURE — 86376 MICROSOMAL ANTIBODY EACH: CPT | Performed by: INTERNAL MEDICINE

## 2017-09-22 PROCEDURE — 80048 BASIC METABOLIC PNL TOTAL CA: CPT | Performed by: INTERNAL MEDICINE

## 2017-09-22 PROCEDURE — 83735 ASSAY OF MAGNESIUM: CPT | Performed by: INTERNAL MEDICINE

## 2017-09-22 RX ORDER — DIPHENHYDRAMINE HCL 25 MG
50 TABLET ORAL EVERY 4 HOURS PRN
Status: DISCONTINUED | OUTPATIENT
Start: 2017-09-22 | End: 2017-09-29 | Stop reason: HOSPADM

## 2017-09-22 RX ADMIN — PANTOPRAZOLE SODIUM 40 MG: 40 INJECTION, POWDER, FOR SOLUTION INTRAVENOUS at 08:20

## 2017-09-22 RX ADMIN — LORAZEPAM 1 MG: 1 TABLET ORAL at 10:29

## 2017-09-22 RX ADMIN — LORAZEPAM 1 MG: 1 TABLET ORAL at 20:59

## 2017-09-22 RX ADMIN — RIFAXIMIN 550 MG: 550 TABLET ORAL at 21:00

## 2017-09-22 RX ADMIN — DIPHENHYDRAMINE HYDROCHLORIDE 50 MG: 50 INJECTION, SOLUTION INTRAMUSCULAR; INTRAVENOUS at 15:25

## 2017-09-22 RX ADMIN — RIFAXIMIN 550 MG: 550 TABLET ORAL at 06:11

## 2017-09-22 RX ADMIN — TRAMADOL HYDROCHLORIDE 50 MG: 50 TABLET, COATED ORAL at 21:49

## 2017-09-22 RX ADMIN — ENOXAPARIN SODIUM 40 MG: 40 INJECTION SUBCUTANEOUS at 08:20

## 2017-09-22 RX ADMIN — LEVOTHYROXINE SODIUM 75 MCG: 75 TABLET ORAL at 06:11

## 2017-09-22 RX ADMIN — DIPHENHYDRAMINE HCL 50 MG: 25 TABLET ORAL at 20:58

## 2017-09-22 RX ADMIN — PREDNISONE 50 MG: 20 TABLET ORAL at 08:20

## 2017-09-22 RX ADMIN — TRAMADOL HYDROCHLORIDE 50 MG: 50 TABLET, COATED ORAL at 15:31

## 2017-09-22 RX ADMIN — DIPHENHYDRAMINE HYDROCHLORIDE 50 MG: 50 INJECTION, SOLUTION INTRAMUSCULAR; INTRAVENOUS at 10:29

## 2017-09-22 RX ADMIN — PANTOPRAZOLE SODIUM 40 MG: 40 INJECTION, POWDER, FOR SOLUTION INTRAVENOUS at 20:59

## 2017-09-22 RX ADMIN — Medication 250 MG: at 18:43

## 2017-09-22 RX ADMIN — Medication 250 MG: at 08:19

## 2017-09-22 RX ADMIN — TRAMADOL HYDROCHLORIDE 50 MG: 50 TABLET, COATED ORAL at 01:35

## 2017-09-22 RX ADMIN — TRAMADOL HYDROCHLORIDE 50 MG: 50 TABLET, COATED ORAL at 08:19

## 2017-09-22 RX ADMIN — LIOTHYRONINE SODIUM 5 MCG: 5 TABLET ORAL at 06:11

## 2017-09-22 RX ADMIN — DIPHENHYDRAMINE HYDROCHLORIDE 50 MG: 50 INJECTION, SOLUTION INTRAMUSCULAR; INTRAVENOUS at 06:11

## 2017-09-22 RX ADMIN — RIFAXIMIN 550 MG: 550 TABLET ORAL at 15:24

## 2017-09-22 RX ADMIN — DIPHENHYDRAMINE HYDROCHLORIDE 50 MG: 50 INJECTION, SOLUTION INTRAMUSCULAR; INTRAVENOUS at 01:36

## 2017-09-22 RX ADMIN — ASPIRIN 325 MG: 325 TABLET ORAL at 08:19

## 2017-09-22 NOTE — PROGRESS NOTES
[facility] Internal Medicine Progress Note  Patient: Otis Drew 38 y o  female   MRN: [id number]  PCP: Cecil Roberson PA-C  Unit/Bed#: [facility] 823-01 Encounter: [id number]  Date Of Visit: 09/22/17    Assessment/Plan:  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  Recurrent febrile illness w/o clear sources of infection  Mastocytic hyperactivation syndrome  - today I had a long conversation with Yue Marquez  - she had CT scan, MRI and MRCP while in here all with chronic finding of sphincterotomy and CBD dilation that are unchanged and maybe small stone or sludge in gallbladder stump  - she id have EGD/colonoscopy on 9/12 and biopsy findings are for chronic inactive gastritis  - ERCP on 9/16 with widely open Oddi  - zosyn x 10 days with recent 7 days IV abx during prior admission and all negative Cx and transient fever without although pain resolution  - no clinical or radiographic evidence of cholangitis   - 9/21 I spoke with [facility] and d/w him case and at this time is felt that [address] will offer same care and no new procedures  - pressure at Oddi would not be performed for 2 reasons-----> patient has already sphincterotomy and high risk of severe pancreatitis in women  - she does understand that [facility] has declined transfer  - patient saw rheumatologist yesterday night    -- ESR, CRP, C3/C4   IgA/M/G are WNL    -- COREY and other markers are sent and pending as per EMR    -- as per consultant, patient pain might be related to mastocytes disease and less likely vasculitis     -- she went to [facility] for her disease and could not tolerate IV therapy 2/2 severe abdominal pain     -- FU with rheumatology as OP  - today start PO benadryl and if stable 24-48hrs, will attempt d/c home     Elavil allergic reaction  Corn starch allergy  - prednisone taper as started as OP 50 mg for 7 days (#3/7) and then reduce by 10 mg every week  - start benadryl PO     Reported UC diagnosis  - not proven on colonoscopy and biopsies done by our GI group  - monitor for symptoms     Hypophosphatemia  - supplement given  - repeat in am     VTE Pharmacologic Prophylaxis: yes  Pharmacologic: Enoxaparin (Lovenox)  Mechanical VTE Prophylaxis in Place: Yes     Patient Centered Rounds: I have performed bedside rounds with nursing staff today      Discussions with Specialists or Other Care Team Provider: none yet today     Education and Discussions with Family / Patient: patient      Time Spent for Care: 1 hour   More than 50% of total time spent on counseling and coordination of care as described above      Current Length of Stay: 12 day(s)     Current Patient Status: Inpatient   Certification Statement: The patient will continue to require additional inpatient hospital stay due to please refer to above plan     Discharge Plan: TBD     Code Status: Level 1 - Full Code    Subjective:   Patient states that she is feeling better today  She tells me that she is encourage after encounter with Dr Lloyd Rudd yesterday    Objective:     Vitals:   Temp (24hrs), Av 4 °F (36 9 °C), Min:98 1 °F (36 7 °C), Max:98 7 °F (37 1 °C)    HR:  [73-76] 73  Resp:  [18] 18  BP: (107-116)/(60-66) 116/60  SpO2:  [96 %-97 %] 97 %  Body mass index is 22 79 kg/m²  Input and Output Summary (last 24 hours): Intake/Output Summary (Last 24 hours) at 17 1523  Last data filed at 17 1440   Gross per 24 hour   Intake              720 ml   Output             1650 ml   Net             -930 ml       Physical Exam:     Physical Exam   Constitutional: She is oriented to person, place, and time  She appears well-developed  Cardiovascular: Normal rate, regular rhythm and normal heart sounds  Exam reveals no friction rub  No murmur heard  Pulmonary/Chest: Effort normal and breath sounds normal  No respiratory distress  She has no wheezes  She has no rales  Abdominal: Soft  Bowel sounds are normal  She exhibits no distension  There is no tenderness   There is no rebound  Musculoskeletal: She exhibits no edema  Neurological: She is alert and oriented to person, place, and time  She exhibits normal muscle tone  Skin: Skin is warm  Psychiatric: She has a normal mood and affect  Additional Data:     Labs:      Results from last 7 days  Lab Units 09/22/17  0524   WBC Thousand/uL 12 38*   HEMOGLOBIN g/dL 13 0   HEMATOCRIT % 38 9   PLATELETS Thousands/uL 293   NEUTROS PCT % 70   LYMPHS PCT % 21   MONOS PCT % 8   EOS PCT % 1       Results from last 7 days  Lab Units 09/22/17  0524 09/21/17  1907   SODIUM mmol/L 140  --    POTASSIUM mmol/L 4 1  --    CHLORIDE mmol/L 108  --    CO2 mmol/L 24  --    BUN mg/dL 16  --    CREATININE mg/dL 0 72  --    CALCIUM mg/dL 8 5  --    TOTAL PROTEIN g/dL  --  7 0   GLUCOSE RANDOM mg/dL 89  --            * I Have Reviewed All Lab Data Listed Above  * Additional Pertinent Lab Tests Reviewed: All Labs Within Last 24 Hours Reviewed    Imaging:    Imaging Reports Reviewed Today Include: none  Imaging Personally Reviewed by Myself Includes:  none    Recent Cultures (last 7 days):           Last 24 Hours Medication List:     aspirin 325 mg Oral Daily   enoxaparin 40 mg Subcutaneous Daily   levothyroxine 75 mcg Oral Early Morning   liothyronine 5 mcg Oral Early Morning   pantoprazole 40 mg Intravenous Q12H [facility]   predniSONE 50 mg Oral Daily   rifaximin 550 mg Oral Q8H [facility]   saccharomyces boulardii 250 mg Oral BID        Today, Patient Was Seen By: Yovany Meeks MD    ** Please Note: Dragon 360 Dictation voice to text software may have been used in the creation of this document   **

## 2017-09-23 LAB
APTT SCREEN TO CONFIRM RATIO: 1.1 RATIO (ref 0–1.4)
BASOPHILS # BLD AUTO: 0.05 THOUSANDS/ΜL (ref 0–0.1)
BASOPHILS NFR BLD AUTO: 0 % (ref 0–1)
CONFIRM APTT/NORMAL: 42.2 SEC (ref 0–55)
EOSINOPHIL # BLD AUTO: 0.11 THOUSAND/ΜL (ref 0–0.61)
EOSINOPHIL NFR BLD AUTO: 1 % (ref 0–6)
ERYTHROCYTE [DISTWIDTH] IN BLOOD BY AUTOMATED COUNT: 13.2 % (ref 11.6–15.1)
HCT VFR BLD AUTO: 42.4 % (ref 34.8–46.1)
HGB BLD-MCNC: 14.6 G/DL (ref 11.5–15.4)
LA PPP-IMP: NORMAL
LYMPHOCYTES # BLD AUTO: 2.02 THOUSANDS/ΜL (ref 0.6–4.47)
LYMPHOCYTES NFR BLD AUTO: 16 % (ref 14–44)
MCH RBC QN AUTO: 32.7 PG (ref 26.8–34.3)
MCHC RBC AUTO-ENTMCNC: 34.4 G/DL (ref 31.4–37.4)
MCV RBC AUTO: 95 FL (ref 82–98)
MONOCYTES # BLD AUTO: 0.84 THOUSAND/ΜL (ref 0.17–1.22)
MONOCYTES NFR BLD AUTO: 7 % (ref 4–12)
NEUTROPHILS # BLD AUTO: 9.48 THOUSANDS/ΜL (ref 1.85–7.62)
NEUTS SEG NFR BLD AUTO: 76 % (ref 43–75)
NRBC BLD AUTO-RTO: 0 /100 WBCS
PHOSPHATE SERPL-MCNC: 1.8 MG/DL (ref 2.7–4.5)
PLATELET # BLD AUTO: 316 THOUSANDS/UL (ref 149–390)
PMV BLD AUTO: 9.7 FL (ref 8.9–12.7)
RBC # BLD AUTO: 4.46 MILLION/UL (ref 3.81–5.12)
SCREEN APTT: 33 SEC (ref 0–51.9)
SCREEN DRVVT: 31.8 SEC (ref 0–47)
THROMBIN TIME: 18.6 SEC (ref 0–23)
THYROGLOB AB SERPL-ACNC: <1 IU/ML (ref 0–0.9)
THYROPEROXIDASE AB SERPL-ACNC: 10 IU/ML (ref 0–34)
WBC # BLD AUTO: 12.54 THOUSAND/UL (ref 4.31–10.16)

## 2017-09-23 PROCEDURE — C9113 INJ PANTOPRAZOLE SODIUM, VIA: HCPCS | Performed by: HOSPITALIST

## 2017-09-23 PROCEDURE — 84100 ASSAY OF PHOSPHORUS: CPT | Performed by: INTERNAL MEDICINE

## 2017-09-23 PROCEDURE — 85025 COMPLETE CBC W/AUTO DIFF WBC: CPT | Performed by: INTERNAL MEDICINE

## 2017-09-23 RX ADMIN — OXYCODONE HYDROCHLORIDE 5 MG: 5 TABLET ORAL at 23:28

## 2017-09-23 RX ADMIN — DIPHENHYDRAMINE HCL 50 MG: 25 TABLET ORAL at 21:18

## 2017-09-23 RX ADMIN — LORAZEPAM 1 MG: 1 TABLET ORAL at 08:53

## 2017-09-23 RX ADMIN — ASPIRIN 325 MG: 325 TABLET ORAL at 08:54

## 2017-09-23 RX ADMIN — LIOTHYRONINE SODIUM 5 MCG: 5 TABLET ORAL at 06:32

## 2017-09-23 RX ADMIN — POTASSIUM PHOSPHATE, MONOBASIC AND POTASSIUM PHOSPHATE, DIBASIC 30 MMOL: 224; 236 INJECTION, SOLUTION INTRAVENOUS at 17:56

## 2017-09-23 RX ADMIN — PANTOPRAZOLE SODIUM 40 MG: 40 INJECTION, POWDER, FOR SOLUTION INTRAVENOUS at 12:39

## 2017-09-23 RX ADMIN — TRAMADOL HYDROCHLORIDE 50 MG: 50 TABLET, COATED ORAL at 08:53

## 2017-09-23 RX ADMIN — ENOXAPARIN SODIUM 40 MG: 40 INJECTION SUBCUTANEOUS at 08:53

## 2017-09-23 RX ADMIN — Medication 250 MG: at 18:00

## 2017-09-23 RX ADMIN — Medication 250 MG: at 08:54

## 2017-09-23 RX ADMIN — TRAMADOL HYDROCHLORIDE 50 MG: 50 TABLET, COATED ORAL at 18:58

## 2017-09-23 RX ADMIN — DIPHENHYDRAMINE HCL 50 MG: 25 TABLET ORAL at 12:29

## 2017-09-23 RX ADMIN — LORAZEPAM 1 MG: 1 TABLET ORAL at 20:36

## 2017-09-23 RX ADMIN — RIFAXIMIN 550 MG: 550 TABLET ORAL at 21:18

## 2017-09-23 RX ADMIN — LEVOTHYROXINE SODIUM 75 MCG: 75 TABLET ORAL at 06:32

## 2017-09-23 RX ADMIN — PREDNISONE 50 MG: 20 TABLET ORAL at 08:54

## 2017-09-23 RX ADMIN — RIFAXIMIN 550 MG: 550 TABLET ORAL at 13:57

## 2017-09-23 RX ADMIN — RIFAXIMIN 550 MG: 550 TABLET ORAL at 06:32

## 2017-09-23 RX ADMIN — PANTOPRAZOLE SODIUM 40 MG: 40 INJECTION, POWDER, FOR SOLUTION INTRAVENOUS at 20:37

## 2017-09-23 RX ADMIN — ONDANSETRON 4 MG: 2 INJECTION INTRAMUSCULAR; INTRAVENOUS at 12:42

## 2017-09-23 NOTE — PROGRESS NOTES
[facility] Internal Medicine Progress Note  Patient: Man Curran 38 y o  female   MRN: [id number]  PCP: Nadine Panchal PA-C  Unit/Bed#: [facility] 823-01 Encounter: [id number]  Date Of Visit: 09/23/17    Assessment/Plan:  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  Recurrent febrile illness w/o clear sources of infection  Mastocytic hyperactivation syndrome  - she had CT scan, MRI and MRCP while in here all with chronic finding of sphincterotomy and CBD dilation that are unchanged and maybe small stone or sludge in gallbladder stump  - she did have EGD/colonoscopy on 9/12 and biopsy findings are for chronic inactive gastritis  - ERCP on 9/16 with widely open Oddi  - zosyn x 10 days with recent 7 days IV abx during prior admission and all negative Cx and transient fever without although pain resolution  - no clinical or radiographic evidence of cholangitis   - 9/21 I spoke with [facility] and d/w him case and at this time is felt that [facility] will offer same care and no new procedures  - pressure at Oddi would not be performed for 2 reasons-----> patient has already sphincterotomy and high risk of severe pancreatitis in women  - she does understand that [facility] has declined transfer  - patient saw rheumatologist     -- ESR, CRP, C3/C4   IgA/M/G are WNL    -- COREY, anticardiolipin, thyroid microsomial, LENA, RF, SSA, SSB are negative    -- as per consultant, patient pain might be related to mastocytes disease and less likely vasculitis     -- she went to [facility] for her disease and could not tolerate IV therapy 2/2 severe abdominal pain     -- FU with rheumatology as OP  - if again pain today, tomorrow will start TCA given that a corn starch free available in our pharmacy  - leukocytosis noted and flat, likely related to steroids use  - patient has been given website for gluten free drugs and was suggested to buy Elavil gluten free and I would have provided Rx but so far she has not yet accepted this offer (I would gladly profile it for her use while in use)     Elavil allergic reaction  Corn starch allergy  - prednisone taper as started as OP 50 mg for 7 days (#4/7) and then reduce by 10 mg every week  - c/w benadryl PO     Reported UC diagnosis  - not proven on colonoscopy and biopsies done by our GI group  - monitor for symptoms     Hypophosphatemia  - supplement given  - repeat in am     VTE Pharmacologic Prophylaxis: yes  Pharmacologic: Enoxaparin (Lovenox)  Mechanical VTE Prophylaxis in Place: Yes     Patient Centered Rounds: I have performed bedside rounds with nursing staff today      Discussions with Specialists or Other Care Team Provider: none yet today     Education and Discussions with Family / Patient: patient      Time Spent for Care: 1 hour   More than 50% of total time spent on counseling and coordination of care as described above      Current Length of Stay: 13 day(s)     Current Patient Status: Inpatient   Certification Statement: The patient will continue to require additional inpatient hospital stay due to please refer to above plan     Discharge Plan: TBD     Code Status: Level 1 - Full Code    Subjective:   Patient has again pain today    Objective:     Vitals:   Temp (24hrs), Av 4 °F (36 9 °C), Min:98 1 °F (36 7 °C), Max:98 6 °F (37 °C)    HR:  [76-83] 83  Resp:  [14-18] 14  BP: ()/(50-77) 90/50  SpO2:  [96 %-99 %] 96 %  Body mass index is 22 79 kg/m²  Input and Output Summary (last 24 hours): Intake/Output Summary (Last 24 hours) at 17 1649  Last data filed at 17 1300   Gross per 24 hour   Intake              240 ml   Output             2300 ml   Net            -2060 ml       Physical Exam:     Physical Exam   Constitutional: She is oriented to person, place, and time  She appears well-developed  Cardiovascular: Normal rate and regular rhythm  Exam reveals no friction rub  No murmur heard    Pulmonary/Chest: Effort normal and breath sounds normal  No respiratory distress  She has no wheezes  She has no rales  Abdominal: Soft  Bowel sounds are normal  She exhibits no distension  There is no tenderness  There is no rebound  Musculoskeletal: She exhibits no edema  Neurological: She is alert and oriented to person, place, and time  She exhibits normal muscle tone  Skin: Skin is warm  Psychiatric: She has a normal mood and affect  Additional Data:     Labs:      Results from last 7 days  Lab Units 09/23/17  1049   WBC Thousand/uL 12 54*   HEMOGLOBIN g/dL 14 6   HEMATOCRIT % 42 4   PLATELETS Thousands/uL 316   NEUTROS PCT % 76*   LYMPHS PCT % 16   MONOS PCT % 7   EOS PCT % 1       Results from last 7 days  Lab Units 09/22/17  0524 09/21/17  1907   SODIUM mmol/L 140  --    POTASSIUM mmol/L 4 1  --    CHLORIDE mmol/L 108  --    CO2 mmol/L 24  --    BUN mg/dL 16  --    CREATININE mg/dL 0 72  --    CALCIUM mg/dL 8 5  --    TOTAL PROTEIN g/dL  --  7 0   GLUCOSE RANDOM mg/dL 89  --            * I Have Reviewed All Lab Data Listed Above  * Additional Pertinent Lab Tests Reviewed: All Labs Within Last 24 Hours Reviewed    Imaging:    Imaging Reports Reviewed Today Include: none  Imaging Personally Reviewed by Myself Includes:  none    Recent Cultures (last 7 days):           Last 24 Hours Medication List:     aspirin 325 mg Oral Daily   enoxaparin 40 mg Subcutaneous Daily   levothyroxine 75 mcg Oral Early Morning   liothyronine 5 mcg Oral Early Morning   pantoprazole 40 mg Intravenous Q12H [facility]   potassium phosphate 30 mmol Intravenous Once   predniSONE 50 mg Oral Daily   rifaximin 550 mg Oral Q8H [facility]   saccharomyces boulardii 250 mg Oral BID        Today, Patient Was Seen By: Milly Patel MD    ** Please Note: Dragon 360 Dictation voice to text software may have been used in the creation of this document   **

## 2017-09-24 LAB
B2 GLYCOPROT1 IGA SER-ACNC: 10 GPI IGA UNITS (ref 0–25)
B2 GLYCOPROT1 IGG SER-ACNC: <9 GPI IGG UNITS (ref 0–20)
B2 GLYCOPROT1 IGM SER-ACNC: <9 GPI IGM UNITS (ref 0–32)

## 2017-09-24 PROCEDURE — C9113 INJ PANTOPRAZOLE SODIUM, VIA: HCPCS | Performed by: HOSPITALIST

## 2017-09-24 RX ORDER — LIDOCAINE 50 MG/G
1 PATCH TOPICAL DAILY
Status: DISCONTINUED | OUTPATIENT
Start: 2017-09-24 | End: 2017-09-25

## 2017-09-24 RX ADMIN — LORAZEPAM 1 MG: 1 TABLET ORAL at 19:14

## 2017-09-24 RX ADMIN — RIFAXIMIN 550 MG: 550 TABLET ORAL at 21:16

## 2017-09-24 RX ADMIN — LIDOCAINE 1 PATCH: 50 PATCH CUTANEOUS at 10:35

## 2017-09-24 RX ADMIN — TRAMADOL HYDROCHLORIDE 50 MG: 50 TABLET, COATED ORAL at 17:05

## 2017-09-24 RX ADMIN — LIOTHYRONINE SODIUM 5 MCG: 5 TABLET ORAL at 05:10

## 2017-09-24 RX ADMIN — Medication 250 MG: at 17:05

## 2017-09-24 RX ADMIN — ONDANSETRON 4 MG: 2 INJECTION INTRAMUSCULAR; INTRAVENOUS at 10:39

## 2017-09-24 RX ADMIN — PANTOPRAZOLE SODIUM 40 MG: 40 INJECTION, POWDER, FOR SOLUTION INTRAVENOUS at 08:07

## 2017-09-24 RX ADMIN — Medication 250 MG: at 08:08

## 2017-09-24 RX ADMIN — ENOXAPARIN SODIUM 40 MG: 40 INJECTION SUBCUTANEOUS at 08:07

## 2017-09-24 RX ADMIN — RIFAXIMIN 550 MG: 550 TABLET ORAL at 13:01

## 2017-09-24 RX ADMIN — LEVOTHYROXINE SODIUM 75 MCG: 75 TABLET ORAL at 05:09

## 2017-09-24 RX ADMIN — DIPHENHYDRAMINE HCL 50 MG: 25 TABLET ORAL at 05:09

## 2017-09-24 RX ADMIN — ONDANSETRON 4 MG: 2 INJECTION INTRAMUSCULAR; INTRAVENOUS at 01:34

## 2017-09-24 RX ADMIN — RIFAXIMIN 550 MG: 550 TABLET ORAL at 05:09

## 2017-09-24 RX ADMIN — OXYCODONE HYDROCHLORIDE 5 MG: 5 TABLET ORAL at 19:14

## 2017-09-24 RX ADMIN — PREDNISONE 50 MG: 20 TABLET ORAL at 08:08

## 2017-09-24 RX ADMIN — OXYCODONE HYDROCHLORIDE 5 MG: 5 TABLET ORAL at 05:09

## 2017-09-24 RX ADMIN — TRAMADOL HYDROCHLORIDE 50 MG: 50 TABLET, COATED ORAL at 01:44

## 2017-09-24 RX ADMIN — ASPIRIN 325 MG: 325 TABLET ORAL at 08:16

## 2017-09-24 RX ADMIN — DIPHENHYDRAMINE HCL 50 MG: 25 TABLET ORAL at 21:20

## 2017-09-24 RX ADMIN — PANTOPRAZOLE SODIUM 40 MG: 40 INJECTION, POWDER, FOR SOLUTION INTRAVENOUS at 21:16

## 2017-09-24 RX ADMIN — TRAMADOL HYDROCHLORIDE 50 MG: 50 TABLET, COATED ORAL at 10:39

## 2017-09-24 RX ADMIN — POTASSIUM PHOSPHATE, MONOBASIC AND POTASSIUM PHOSPHATE, DIBASIC 30 MMOL: 224; 236 INJECTION, SOLUTION INTRAVENOUS at 18:28

## 2017-09-24 NOTE — PROGRESS NOTES
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  Recurrent febrile illness w/o clear sources of infection  - she had CT scan, MRI and MRCP while in here all with chronic finding of sphincterotomy and CBD dilation that are unchanged and maybe small stone or sludge in gallbladder stump  - she did have EGD/colonoscopy on 9/12 and biopsy findings are for chronic inactive gastritis  - ERCP on 9/16 with widely open Oddi  - zosyn x 10 days with recent 7 days IV abx during prior admission and all negative Cx and transient fever without although pain resolution  - no clinical or radiographic evidence of cholangitis   - 9/21 I spoke with [facility] and d/w him case and at this time is felt that [address] will offer same care and no new procedures  - pressure at Oddi would not be performed for 2 reasons-----> patient has already sphincterotomy and high risk of severe pancreatitis in women  - she does understand that [facility] has declined transfer  - patient saw rheumatologist     -- ESR, CRP, C3/C4   IgA/M/G are WNL    -- COREY, anticardiolipin, thyroid microsomial, LENA, RF, SSA, SSB are negative, other studies are pending    -- as per consultant, patient pain might be related to mastocytes disease and less likely vasculitis     -- she went to [facility] for her disease and could not tolerate IV therapy 2/2 severe abdominal pain     -- FU with rheumatology as OP  - d/w pharmacy several times during this week----> unfortunately no TCA available corn starch free and even compound might need to be binded by corn starch------> d/w GI, patient not candidate for TCA  - started lidocaine patch RUQ  - GI to eval again in am-----> patient requesting capsule endoscopy-----> advised to discuss this matter with Gi as group to authorize the study as per policy  - monitor     Elavil allergic reaction  Corn starch allergy  Mastocytic hyperactivation syndrome  - prednisone taper as started as OP 50 mg for 7 days (#5/7) and then reduce by 10 mg every week  - c/w benadryl PO  - FU with [facility] allergology and immunology group     Reported UC diagnosis  - not proven on colonoscopy and biopsies done by our GI group  - monitor for symptoms     Hypophosphatemia  - supplement given  - repeat in am tomorrow  - persistently low despite IV supplement  - check 24 Hr urine phosphorus, PTH, vitamin D level, renin and aldosterone for completion  - also, to be said that when ready to transition to PO as OP ([place] makes Phos-NaK powder w/o corn starch and [place] makes K Phos with POTATO starch) PPI might reduce absorbption  - also patient at this point takes steroids either PO or IV all year long with extremely brief hiatuses      VTE Pharmacologic Prophylaxis: yes  Pharmacologic: Enoxaparin (Lovenox)  Mechanical VTE Prophylaxis in Place: Yes     Patient Centered Rounds: I have performed bedside rounds with nursing staff today      Discussions with Specialists or Other Care Team Provider: GI and pharmacy     Education and Discussions with Family / Danica Bullock and her  at bedside     Time Spent for Care: 1 hour   More than 50% of total time spent on counseling and coordination of care as described above      Current Length of Stay: 14 day(s)     Current Patient Status: Inpatient   Certification Statement: The patient will continue to require additional inpatient hospital stay due to please refer to above plan     Discharge Plan: TBD     Code Status: Level 1 - Full Code    Subjective:   Patient states that she is feeling not too bad today  Yesterday she did have more pain and some diarrhea    Objective:     Vitals:   Temp (24hrs), Av 5 °F (36 9 °C), Min:98 3 °F (36 8 °C), Max:98 7 °F (37 1 °C)    HR:  [] 114  Resp:  [16-18] 18  BP: ()/(63-73) 125/68  SpO2:  [95 %-98 %] 98 %  Body mass index is 22 79 kg/m²  Input and Output Summary (last 24 hours): Intake/Output Summary (Last 24 hours) at 09/24/17 1521  Last data filed at 09/24/17 0426   Gross per 24 hour   Intake              240 ml   Output             1000 ml   Net             -760 ml       Physical Exam:     Physical Exam   Constitutional: She is oriented to person, place, and time  She appears well-developed  Cardiovascular: Normal rate, regular rhythm and normal heart sounds  Exam reveals no friction rub  No murmur heard  Pulmonary/Chest: Effort normal and breath sounds normal  No respiratory distress  She has no wheezes  She has no rales  Abdominal: Soft  Bowel sounds are normal  She exhibits no distension  There is no tenderness  There is no rebound  Musculoskeletal: She exhibits no edema  Neurological: She is alert and oriented to person, place, and time  She exhibits normal muscle tone  Skin: Skin is warm  Psychiatric: She has a normal mood and affect  Additional Data:     Labs:      Results from last 7 days  Lab Units 09/23/17  1049   WBC Thousand/uL 12 54*   HEMOGLOBIN g/dL 14 6   HEMATOCRIT % 42 4   PLATELETS Thousands/uL 316   NEUTROS PCT % 76*   LYMPHS PCT % 16   MONOS PCT % 7   EOS PCT % 1       Results from last 7 days  Lab Units 09/22/17  0524 09/21/17  1907   SODIUM mmol/L 140  --    POTASSIUM mmol/L 4 1  --    CHLORIDE mmol/L 108  --    CO2 mmol/L 24  --    BUN mg/dL 16  --    CREATININE mg/dL 0 72  --    CALCIUM mg/dL 8 5  --    TOTAL PROTEIN g/dL  --  7 0   GLUCOSE RANDOM mg/dL 89  --            * I Have Reviewed All Lab Data Listed Above  * Additional Pertinent Lab Tests Reviewed:  All Labs Within Last 24 Hours Reviewed    Imaging:    Imaging Reports Reviewed Today Include: none  Imaging Personally Reviewed by Myself Includes:  none    Recent Cultures (last 7 days):           Last 24 Hours Medication List:     aspirin 325 mg Oral Daily   enoxaparin 40 mg Subcutaneous Daily   levothyroxine 75 mcg Oral Early Morning   lidocaine 1 patch Transdermal Daily   liothyronine 5 mcg Oral Early Morning   pantoprazole 40 mg Intravenous Q12H [facility] HOME   predniSONE 50 mg Oral Daily   rifaximin 550 mg Oral Q8H [facility]   saccharomyces boulardii 250 mg Oral BID        Today, Patient Was Seen By: Ronnie Dias MD    ** Please Note: Dragon 360 Dictation voice to text software may have been used in the creation of this document   **

## 2017-09-24 NOTE — PROGRESS NOTES
Pt resting in  Bed  C/o abd pain  Offered iv meds for pain control but wants to wait for po meds when due  abd soft and non distended  Lajean Cassette aqua k pad encouraged  , lfa nsl intact  No nausea, vomiting or diarrhea reported   Will monitor

## 2017-09-25 LAB
ANION GAP SERPL CALCULATED.3IONS-SCNC: 7 MMOL/L (ref 4–13)
BASOPHILS # BLD AUTO: 0.03 THOUSANDS/ΜL (ref 0–0.1)
BASOPHILS NFR BLD AUTO: 0 % (ref 0–1)
BUN SERPL-MCNC: 13 MG/DL (ref 5–25)
C-ANCA TITR SER IF: NORMAL TITER
CALCIUM SERPL-MCNC: 8.8 MG/DL (ref 8.3–10.1)
CCP IGA+IGG SERPL IA-ACNC: 12 UNITS (ref 0–19)
CHLORIDE SERPL-SCNC: 104 MMOL/L (ref 100–108)
CO2 SERPL-SCNC: 28 MMOL/L (ref 21–32)
CREAT SERPL-MCNC: 0.71 MG/DL (ref 0.6–1.3)
EOSINOPHIL # BLD AUTO: 0.08 THOUSAND/ΜL (ref 0–0.61)
EOSINOPHIL NFR BLD AUTO: 1 % (ref 0–6)
ERYTHROCYTE [DISTWIDTH] IN BLOOD BY AUTOMATED COUNT: 13.1 % (ref 11.6–15.1)
GFR SERPL CREATININE-BSD FRML MDRD: 104 ML/MIN/1.73SQ M
GLUCOSE SERPL-MCNC: 79 MG/DL (ref 65–140)
HCT VFR BLD AUTO: 38.9 % (ref 34.8–46.1)
HGB BLD-MCNC: 13.2 G/DL (ref 11.5–15.4)
LYMPHOCYTES # BLD AUTO: 4.08 THOUSANDS/ΜL (ref 0.6–4.47)
LYMPHOCYTES NFR BLD AUTO: 30 % (ref 14–44)
MAGNESIUM SERPL-MCNC: 2.2 MG/DL (ref 1.6–2.6)
MCH RBC QN AUTO: 32.2 PG (ref 26.8–34.3)
MCHC RBC AUTO-ENTMCNC: 33.9 G/DL (ref 31.4–37.4)
MCV RBC AUTO: 95 FL (ref 82–98)
MONOCYTES # BLD AUTO: 1.01 THOUSAND/ΜL (ref 0.17–1.22)
MONOCYTES NFR BLD AUTO: 8 % (ref 4–12)
MYELOPEROXIDASE AB SER IA-ACNC: <9 U/ML (ref 0–9)
NEUTROPHILS # BLD AUTO: 8.25 THOUSANDS/ΜL (ref 1.85–7.62)
NEUTS SEG NFR BLD AUTO: 61 % (ref 43–75)
NRBC BLD AUTO-RTO: 0 /100 WBCS
P-ANCA ATYPICAL TITR SER IF: NORMAL TITER
P-ANCA TITR SER IF: NORMAL TITER
PHOSPHATE 24H UR-MCNC: 1743.65 MG/24 HRS (ref 400–1300)
PHOSPHATE SERPL-MCNC: 3.8 MG/DL (ref 2.7–4.5)
PLATELET # BLD AUTO: 309 THOUSANDS/UL (ref 149–390)
PMV BLD AUTO: 9.2 FL (ref 8.9–12.7)
POTASSIUM SERPL-SCNC: 4.1 MMOL/L (ref 3.5–5.3)
PROTEINASE3 AB SER IA-ACNC: <3.5 U/ML (ref 0–3.5)
PTH-INTACT SERPL-MCNC: 40.6 PG/ML (ref 14–72)
RBC # BLD AUTO: 4.1 MILLION/UL (ref 3.81–5.12)
SODIUM SERPL-SCNC: 139 MMOL/L (ref 136–145)
SPECIMEN VOL UR: 2150 ML
WBC # BLD AUTO: 13.5 THOUSAND/UL (ref 4.31–10.16)

## 2017-09-25 PROCEDURE — 82088 ASSAY OF ALDOSTERONE: CPT | Performed by: INTERNAL MEDICINE

## 2017-09-25 PROCEDURE — 83735 ASSAY OF MAGNESIUM: CPT | Performed by: INTERNAL MEDICINE

## 2017-09-25 PROCEDURE — C9113 INJ PANTOPRAZOLE SODIUM, VIA: HCPCS | Performed by: HOSPITALIST

## 2017-09-25 PROCEDURE — 82306 VITAMIN D 25 HYDROXY: CPT | Performed by: INTERNAL MEDICINE

## 2017-09-25 PROCEDURE — 80048 BASIC METABOLIC PNL TOTAL CA: CPT | Performed by: INTERNAL MEDICINE

## 2017-09-25 PROCEDURE — 85025 COMPLETE CBC W/AUTO DIFF WBC: CPT | Performed by: INTERNAL MEDICINE

## 2017-09-25 PROCEDURE — 83970 ASSAY OF PARATHORMONE: CPT | Performed by: INTERNAL MEDICINE

## 2017-09-25 PROCEDURE — 84100 ASSAY OF PHOSPHORUS: CPT | Performed by: INTERNAL MEDICINE

## 2017-09-25 PROCEDURE — 84244 ASSAY OF RENIN: CPT | Performed by: INTERNAL MEDICINE

## 2017-09-25 PROCEDURE — 84105 ASSAY OF URINE PHOSPHORUS: CPT | Performed by: INTERNAL MEDICINE

## 2017-09-25 RX ORDER — LIDOCAINE 50 MG/G
2 PATCH TOPICAL DAILY
Status: DISCONTINUED | OUTPATIENT
Start: 2017-09-25 | End: 2017-09-29 | Stop reason: HOSPADM

## 2017-09-25 RX ADMIN — LORAZEPAM 1 MG: 1 TABLET ORAL at 01:18

## 2017-09-25 RX ADMIN — RIFAXIMIN 550 MG: 550 TABLET ORAL at 05:36

## 2017-09-25 RX ADMIN — LEVOTHYROXINE SODIUM 75 MCG: 75 TABLET ORAL at 05:36

## 2017-09-25 RX ADMIN — LORAZEPAM 1 MG: 1 TABLET ORAL at 21:03

## 2017-09-25 RX ADMIN — RIFAXIMIN 550 MG: 550 TABLET ORAL at 21:03

## 2017-09-25 RX ADMIN — ENOXAPARIN SODIUM 40 MG: 40 INJECTION SUBCUTANEOUS at 08:56

## 2017-09-25 RX ADMIN — DIPHENHYDRAMINE HCL 50 MG: 25 TABLET ORAL at 13:02

## 2017-09-25 RX ADMIN — TRAMADOL HYDROCHLORIDE 50 MG: 50 TABLET, COATED ORAL at 19:28

## 2017-09-25 RX ADMIN — TRAMADOL HYDROCHLORIDE 50 MG: 50 TABLET, COATED ORAL at 00:02

## 2017-09-25 RX ADMIN — Medication 250 MG: at 17:44

## 2017-09-25 RX ADMIN — Medication 250 MG: at 08:55

## 2017-09-25 RX ADMIN — LIOTHYRONINE SODIUM 5 MCG: 5 TABLET ORAL at 05:36

## 2017-09-25 RX ADMIN — ASPIRIN 325 MG: 325 TABLET ORAL at 08:55

## 2017-09-25 RX ADMIN — DIPHENHYDRAMINE HCL 50 MG: 25 TABLET ORAL at 19:29

## 2017-09-25 RX ADMIN — LIDOCAINE 2 PATCH: 50 PATCH CUTANEOUS at 08:56

## 2017-09-25 RX ADMIN — PANTOPRAZOLE SODIUM 40 MG: 40 INJECTION, POWDER, FOR SOLUTION INTRAVENOUS at 08:56

## 2017-09-25 RX ADMIN — LORAZEPAM 1 MG: 1 TABLET ORAL at 14:09

## 2017-09-25 RX ADMIN — PANTOPRAZOLE SODIUM 40 MG: 40 INJECTION, POWDER, FOR SOLUTION INTRAVENOUS at 21:03

## 2017-09-25 RX ADMIN — TRAMADOL HYDROCHLORIDE 50 MG: 50 TABLET, COATED ORAL at 13:02

## 2017-09-25 RX ADMIN — PREDNISONE 50 MG: 20 TABLET ORAL at 08:55

## 2017-09-25 RX ADMIN — RIFAXIMIN 550 MG: 550 TABLET ORAL at 14:09

## 2017-09-25 NOTE — PROGRESS NOTES
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  Recurrent febrile illness w/o clear sources of infection  - she had CT scan, MRI and MRCP while in here all with chronic finding of sphincterotomy and CBD dilation that are unchanged and maybe small stone or sludge in gallbladder stump  - she did have EGD/colonoscopy on 9/12 and biopsy findings are for chronic inactive gastritis  - ERCP on 9/16 with widely open Oddi  - zosyn x 10 days with recent 7 days IV abx during prior admission and all negative Cx and transient fever without although pain resolution  - no clinical or radiographic evidence of cholangitis   - 9/21 I spoke with [facility] and d/w him case and at this time is felt that [address] will offer same care and no new procedures  - pressure at Oddi would not be performed for 2 reasons-----> patient has already sphincterotomy and high risk of severe pancreatitis in women  - she does understand that [facility] has declined transfer  - patient saw rheumatologist     -- ESR, CRP, C3/C4   IgA/M/G are WNL    -- COREY, anticardiolipin, thyroid microsomial, LENA, RF, SSA, SSB are negative, other studies are pending and will be followed as OP    -- as per consultant, patient pain might be related to mastocytes disease and less likely vasculitis     -- she went to [facility] for her disease and could not tolerate IV therapy 2/2 severe abdominal pain     -- FU with rheumatology as OP  - d/w pharmacy several times during this week----> unfortunately no TCA available corn starch free and even compound might need to be binded by corn starch------> d/w GI, patient not candidate for TCA  - started lidocaine patch RUQ on 9/24 and increased to 2 today  - GI will sent for [place] small bowel for transit evaluation in am and if abnormal might go for capsule endoscopy  - patient suggested to talk with her allergologist and ask if desensitization to corn starch would be an option as this will reduce greatly the limitation of the medications that could be used including TCA     Elavil allergic reaction  Corn starch allergy  Mastocytic hyperactivation syndrome  - prednisone taper as started as OP 50 mg for 7 days (#6/7) and then reduce by 10 mg every week (start 40 mg on )  - c/w benadryl PO  - FU with [facility] allergology and immunology group     Reported UC diagnosis  - not proven on colonoscopy and biopsies done by our GI group  - monitor for symptoms     Hypophosphatemia,  resolved  - suspected poor absorption plus on/off diarrhea plus chronic steroid use and use of PPI and also known vit D deficiency  - PTH WNL  - phosphorus urine 24 hrs is pending  - aldosterone and renin pending----> patient although on steroids  - Vit D panel is pending  - if PO supplements are needed as OP, [place] makes Phos-NaK powder w/o corn starch and [place] makes K Phos with POTATO starch     VTE Pharmacologic Prophylaxis: yes  Pharmacologic: Enoxaparin (Lovenox)  Mechanical VTE Prophylaxis in Place: Yes     Patient Centered Rounds: I have performed bedside rounds with nursing staff today      Discussions with Specialists or Other Care Team Provider: GI     Education and Discussions with Family / Katia Landeros and her mom at bedside     Time Spent for Care: 1 hour   More than 50% of total time spent on counseling and coordination of care as described above      Current Length of Stay: 15 day(s)     Current Patient Status: Inpatient   Certification Statement: The patient will continue to require additional inpatient hospital stay due to please refer to above plan     Discharge Plan: TBD     Code Status: Level 1 - Full Code    Subjective:   Patient states that she is feeling "ok"   Mother at bedside    Objective:     Vitals:   Temp (24hrs), Av 3 °F (36 8 °C), Min:98 1 °F (36 7 °C), Max:98 4 °F (36 9 °C)    HR:  [69-99] 99  Resp:  [16-18] 16  BP: (106-114)/(67-73) 106/68  SpO2:  [96 %-99 %] 96 %  Body mass index is 22 79 kg/m²  Input and Output Summary (last 24 hours): Intake/Output Summary (Last 24 hours) at 09/25/17 1616  Last data filed at 09/25/17 1401   Gross per 24 hour   Intake             1560 ml   Output             2275 ml   Net             -715 ml       Physical Exam:     Physical Exam   Constitutional: She is oriented to person, place, and time  She appears well-developed  Cardiovascular: Normal rate, regular rhythm and normal heart sounds  Exam reveals no friction rub  No murmur heard  Pulmonary/Chest: Effort normal and breath sounds normal  No respiratory distress  She has no wheezes  She has no rales  Abdominal: Soft  Bowel sounds are normal  She exhibits no distension  There is no tenderness  There is no rebound  Musculoskeletal: She exhibits no edema  Neurological: She is alert and oriented to person, place, and time  She exhibits normal muscle tone  Skin: Skin is warm  Psychiatric: She has a normal mood and affect  Additional Data:     Labs:      Results from last 7 days  Lab Units 09/25/17  0518   WBC Thousand/uL 13 50*   HEMOGLOBIN g/dL 13 2   HEMATOCRIT % 38 9   PLATELETS Thousands/uL 309   NEUTROS PCT % 61   LYMPHS PCT % 30   MONOS PCT % 8   EOS PCT % 1       Results from last 7 days  Lab Units 09/25/17  0518  09/21/17  1907   SODIUM mmol/L 139  < >  --    POTASSIUM mmol/L 4 1  < >  --    CHLORIDE mmol/L 104  < >  --    CO2 mmol/L 28  < >  --    BUN mg/dL 13  < >  --    CREATININE mg/dL 0 71  < >  --    CALCIUM mg/dL 8 8  < >  --    TOTAL PROTEIN g/dL  --   --  7 0   GLUCOSE RANDOM mg/dL 79  < >  --    < > = values in this interval not displayed  * I Have Reviewed All Lab Data Listed Above  * Additional Pertinent Lab Tests Reviewed:  All Labs Within Last 24 Hours Reviewed    Imaging:    Imaging Reports Reviewed Today Include: none  Imaging Personally Reviewed by Myself Includes:  none    Recent Cultures (last 7 days):           Last 24 Hours Medication List:     aspirin 325 mg Oral Daily   enoxaparin 40 mg Subcutaneous Daily   levothyroxine 75 mcg Oral Early Morning   lidocaine 2 patch Transdermal Daily   liothyronine 5 mcg Oral Early Morning   pantoprazole 40 mg Intravenous Q12H [address]   predniSONE 50 mg Oral Daily   rifaximin 550 mg Oral Q8H [address]   saccharomyces boulardii 250 mg Oral BID        Today, Patient Was Seen By: Rosalinda Ramirez MD    ** Please Note: Dragon 360 Dictation voice to text software may have been used in the creation of this document   **

## 2017-09-25 NOTE — SOCIAL WORK
Cm met with Dr Taylor Rivera and pt continues with pain management   Pt for possible capsul endoscopy awaiting GI input   When medically clear will d/c home with family

## 2017-09-25 NOTE — NUTRITION
09/25/17 1518   Recommendations/Interventions   Summary Patient's appetite is fair, patient continues to c/o diarrhea, abdominal pain  Awaiting GI input for possible capsule endoscopy per chart review  Minimal weight loss noted  Interventions Diet: continued as ordered; Other (comment)  (RD provided patient with a cafeteria menu list, vegetarian menu and substitution list for additional food selections  Naked protein juice BID to be provided  )   Nutrition Recommendations Continue diet as ordered; Other (specify)  (Obtain weekly standing weight  )

## 2017-09-25 NOTE — CASE MANAGEMENT
Continued Stay Review    Date: 9/25/2017    Vital Signs: /68   Pulse 99   Temp 98 1 °F (36 7 °C) (Oral)   Resp 16   Ht 5' 5 25" (1 657 m)   Wt 62 6 kg (138 lb)   SpO2 96%   BMI 22 79 kg/m²     Medications:   Scheduled Meds:   aspirin 325 mg Oral Daily   enoxaparin 40 mg Subcutaneous Daily   levothyroxine 75 mcg Oral Early Morning   lidocaine 2 patch Transdermal Daily   liothyronine 5 mcg Oral Early Morning   pantoprazole 40 mg Intravenous Q12H [facility] HOME   predniSONE 50 mg Oral Daily   rifaximin 550 mg Oral Q8H [facility]   saccharomyces boulardii 250 mg Oral BID     Continuous Infusions:    PRN Meds:   acetaminophen    diphenhydrAMINE    hyoscyamine    LORazepam    ondansetron    oxyCODONE    traMADol    Abnormal Labs/Diagnostic Results: wbc 13 50  Phos 1 8    Age/Sex: 38 y o  female     Assessment/Plan: Assessment/Plan:  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  Recurrent febrile illness w/o clear sources of infection  - she had CT scan, MRI and MRCP while in here all with chronic finding of sphincterotomy and CBD dilation that are unchanged and maybe small stone or sludge in gallbladder stump  - she did have EGD/colonoscopy on 9/12 and biopsy findings are for chronic inactive gastritis  - ERCP on 9/16 with widely open Oddi  - zosyn x 10 days with recent 7 days IV abx during prior admission and all negative Cx and transient fever without although pain resolution  - no clinical or radiographic evidence of cholangitis   - 9/21 I spoke with [facility] and d/w him case and at this time is felt that [facility] will offer same care and no new procedures  - pressure at [facility]i would not be performed for 2 reasons-----> patient has already sphincterotomy and high risk of severe pancreatitis in women  - she does understand that [facility] has declined transfer  - patient saw rheumatologist     -- ESR, CRP, C3/C4   IgA/M/G are WNL    -- COREY, anticardiolipin, thyroid microsomial, LENA, RF, SSA, SSB are negative, other studies are pending and will be followed as OP    -- as per consultant, patient pain might be related to mastocytes disease and less likely vasculitis     -- she went to [place] for her disease and could not tolerate IV therapy 2/2 severe abdominal pain     -- FU with rheumatology as OP  - d/w pharmacy several times during this week----> unfortunately no TCA available corn starch free and even compound might need to be binded by corn starch------> d/w GI, patient not candidate for TCA  - started lidocaine patch RUQ on 9/24 and increased to 2 today  - GI will sent for [place] small bowel for transit evaluation in am and if abnormal might go for capsule endoscopy  - patient suggested to talk with her allergologist and ask if desensitization to corn starch would be an option as this will reduce greatly the limitation of the medications that could be used including TCA     Elavil allergic reaction  Corn starch allergy  Mastocytic hyperactivation syndrome  - prednisone taper as started as OP 50 mg for 7 days (#6/7) and then reduce by 10 mg every week (start 40 mg on 9/27)  - c/w benadryl PO  - FU with [facility] allergology and immunology group     Reported UC diagnosis  - not proven on colonoscopy and biopsies done by our GI group  - monitor for symptoms     Hypophosphatemia,  resolved  - suspected poor absorption plus on/off diarrhea plus chronic steroid use and use of PPI and also known vit D deficiency  - PTH WNL  - phosphorus urine 24 hrs is pending  - aldosterone and renin pending----> patient although on steroids  - Vit D panel is pending  - if PO supplements are needed as OP, [place] makes Phos-NaK powder w/o corn starch and [place] makes K Phos with POTATO starch   The patient will continue to require additional inpatient hospital stay due to please refer to above plan       Discharge Plan: when cleared will d/c home with family

## 2017-09-26 ENCOUNTER — APPOINTMENT (INPATIENT)
Dept: RADIOLOGY | Facility: HOSPITAL | Age: 42
DRG: 392 | End: 2017-09-26
Payer: COMMERCIAL

## 2017-09-26 LAB
ANION GAP SERPL CALCULATED.3IONS-SCNC: 7 MMOL/L (ref 4–13)
BASOPHILS # BLD AUTO: 0.04 THOUSANDS/ΜL (ref 0–0.1)
BASOPHILS NFR BLD AUTO: 0 % (ref 0–1)
BUN SERPL-MCNC: 14 MG/DL (ref 5–25)
CALCIUM SERPL-MCNC: 9.1 MG/DL (ref 8.3–10.1)
CHLORIDE SERPL-SCNC: 101 MMOL/L (ref 100–108)
CO2 SERPL-SCNC: 29 MMOL/L (ref 21–32)
CREAT SERPL-MCNC: 0.61 MG/DL (ref 0.6–1.3)
EOSINOPHIL # BLD AUTO: 0.08 THOUSAND/ΜL (ref 0–0.61)
EOSINOPHIL NFR BLD AUTO: 1 % (ref 0–6)
ERYTHROCYTE [DISTWIDTH] IN BLOOD BY AUTOMATED COUNT: 12.9 % (ref 11.6–15.1)
GFR SERPL CREATININE-BSD FRML MDRD: 111 ML/MIN/1.73SQ M
GLUCOSE SERPL-MCNC: 75 MG/DL (ref 65–140)
HCT VFR BLD AUTO: 41.9 % (ref 34.8–46.1)
HGB BLD-MCNC: 14 G/DL (ref 11.5–15.4)
LYMPHOCYTES # BLD AUTO: 3.86 THOUSANDS/ΜL (ref 0.6–4.47)
LYMPHOCYTES NFR BLD AUTO: 30 % (ref 14–44)
MAGNESIUM SERPL-MCNC: 2.4 MG/DL (ref 1.6–2.6)
MCH RBC QN AUTO: 32.2 PG (ref 26.8–34.3)
MCHC RBC AUTO-ENTMCNC: 33.4 G/DL (ref 31.4–37.4)
MCV RBC AUTO: 96 FL (ref 82–98)
MONOCYTES # BLD AUTO: 0.92 THOUSAND/ΜL (ref 0.17–1.22)
MONOCYTES NFR BLD AUTO: 7 % (ref 4–12)
NEUTROPHILS # BLD AUTO: 7.91 THOUSANDS/ΜL (ref 1.85–7.62)
NEUTS SEG NFR BLD AUTO: 62 % (ref 43–75)
NRBC BLD AUTO-RTO: 0 /100 WBCS
PHOSPHATE SERPL-MCNC: 2.4 MG/DL (ref 2.7–4.5)
PLATELET # BLD AUTO: 337 THOUSANDS/UL (ref 149–390)
PMV BLD AUTO: 9.6 FL (ref 8.9–12.7)
POTASSIUM SERPL-SCNC: 3.9 MMOL/L (ref 3.5–5.3)
RBC # BLD AUTO: 4.35 MILLION/UL (ref 3.81–5.12)
SODIUM SERPL-SCNC: 137 MMOL/L (ref 136–145)
WBC # BLD AUTO: 12.86 THOUSAND/UL (ref 4.31–10.16)

## 2017-09-26 PROCEDURE — 83735 ASSAY OF MAGNESIUM: CPT | Performed by: INTERNAL MEDICINE

## 2017-09-26 PROCEDURE — 74250 X-RAY XM SM INT 1CNTRST STD: CPT

## 2017-09-26 PROCEDURE — C9113 INJ PANTOPRAZOLE SODIUM, VIA: HCPCS | Performed by: HOSPITALIST

## 2017-09-26 PROCEDURE — 80048 BASIC METABOLIC PNL TOTAL CA: CPT | Performed by: INTERNAL MEDICINE

## 2017-09-26 PROCEDURE — 85025 COMPLETE CBC W/AUTO DIFF WBC: CPT | Performed by: INTERNAL MEDICINE

## 2017-09-26 PROCEDURE — 84100 ASSAY OF PHOSPHORUS: CPT | Performed by: INTERNAL MEDICINE

## 2017-09-26 RX ORDER — PREDNISONE 20 MG/1
40 TABLET ORAL DAILY
Status: DISCONTINUED | OUTPATIENT
Start: 2017-09-27 | End: 2017-09-29 | Stop reason: HOSPADM

## 2017-09-26 RX ORDER — PREDNISONE 20 MG/1
20 TABLET ORAL DAILY
Status: DISCONTINUED | OUTPATIENT
Start: 2017-10-11 | End: 2017-09-29 | Stop reason: HOSPADM

## 2017-09-26 RX ORDER — PREDNISONE 10 MG/1
10 TABLET ORAL DAILY
Status: DISCONTINUED | OUTPATIENT
Start: 2017-10-18 | End: 2017-09-29 | Stop reason: HOSPADM

## 2017-09-26 RX ADMIN — LIOTHYRONINE SODIUM 5 MCG: 5 TABLET ORAL at 05:00

## 2017-09-26 RX ADMIN — ENOXAPARIN SODIUM 40 MG: 40 INJECTION SUBCUTANEOUS at 11:38

## 2017-09-26 RX ADMIN — TRAMADOL HYDROCHLORIDE 50 MG: 50 TABLET, COATED ORAL at 04:53

## 2017-09-26 RX ADMIN — Medication 250 MG: at 17:00

## 2017-09-26 RX ADMIN — TRAMADOL HYDROCHLORIDE 50 MG: 50 TABLET, COATED ORAL at 11:47

## 2017-09-26 RX ADMIN — OXYCODONE HYDROCHLORIDE 5 MG: 5 TABLET ORAL at 15:51

## 2017-09-26 RX ADMIN — PANTOPRAZOLE SODIUM 40 MG: 40 INJECTION, POWDER, FOR SOLUTION INTRAVENOUS at 21:09

## 2017-09-26 RX ADMIN — Medication 250 MG: at 11:38

## 2017-09-26 RX ADMIN — DIPHENHYDRAMINE HCL 50 MG: 25 TABLET ORAL at 11:47

## 2017-09-26 RX ADMIN — ONDANSETRON 4 MG: 2 INJECTION INTRAMUSCULAR; INTRAVENOUS at 12:00

## 2017-09-26 RX ADMIN — PANTOPRAZOLE SODIUM 40 MG: 40 INJECTION, POWDER, FOR SOLUTION INTRAVENOUS at 11:39

## 2017-09-26 RX ADMIN — RIFAXIMIN 550 MG: 550 TABLET ORAL at 13:47

## 2017-09-26 RX ADMIN — PREDNISONE 50 MG: 20 TABLET ORAL at 11:39

## 2017-09-26 RX ADMIN — ASPIRIN 325 MG: 325 TABLET ORAL at 11:38

## 2017-09-26 RX ADMIN — LEVOTHYROXINE SODIUM 75 MCG: 75 TABLET ORAL at 05:00

## 2017-09-26 RX ADMIN — LORAZEPAM 1 MG: 1 TABLET ORAL at 21:09

## 2017-09-26 RX ADMIN — RIFAXIMIN 550 MG: 550 TABLET ORAL at 05:00

## 2017-09-26 RX ADMIN — DIPHENHYDRAMINE HCL 50 MG: 25 TABLET ORAL at 20:13

## 2017-09-26 RX ADMIN — LORAZEPAM 1 MG: 1 TABLET ORAL at 04:53

## 2017-09-26 RX ADMIN — LIDOCAINE 2 PATCH: 50 PATCH CUTANEOUS at 11:38

## 2017-09-26 RX ADMIN — DIPHENHYDRAMINE HCL 50 MG: 25 TABLET ORAL at 15:51

## 2017-09-26 RX ADMIN — RIFAXIMIN 550 MG: 550 TABLET ORAL at 21:09

## 2017-09-26 RX ADMIN — POTASSIUM PHOSPHATE, MONOBASIC AND POTASSIUM PHOSPHATE, DIBASIC 21 MMOL: 224; 236 INJECTION, SOLUTION INTRAVENOUS at 11:39

## 2017-09-26 RX ADMIN — TRAMADOL HYDROCHLORIDE 50 MG: 50 TABLET, COATED ORAL at 20:13

## 2017-09-26 NOTE — SOCIAL WORK
CM met with Dr Melida Jimenez and pt for [facility] small bowel if abnormal possible capsul endoscopy   Pt has poor oral intake   Cm will follow

## 2017-09-26 NOTE — PROGRESS NOTES
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 and d/w him case and at this time is felt that [facility] will offer same care and no new procedures  - pressure at Oddi would not be performed for 2 reasons-----> patient has already sphincterotomy and high risk of severe pancreatitis in women  - she does understand that [facility] has declined transfer  - patient saw rheumatologist     -- ESR, CRP, C3/C4   IgA/M/G are WNL    -- COREY, anticardiolipin, thyroid microsomial, LENA, RF, SSA, SSB are negative, other studies are pending and will be followed as OP  - FL small bowel WNL   -- as per consultant, patient pain might be related to mastocytes disease and less likely vasculitis     -- she went to [facility] for her disease and could not tolerate IV therapy 2/2 severe abdominal pain     -- FU with rheumatology as OP  - d/w pharmacy several times during this week----> unfortunately no TCA available corn starch free and even compound might need to be binded by corn starch------> d/w GI, patient not candidate for TCA  - started lidocaine patch RUQ on  and increased to 2 today  -d/w GI  Will see and examine and discusss further plan     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: On slow prednisone taper  Will start 40 mg daily tomorrow  Reported UC dx:   Not proven on our colon  On steroids    Hypophos:   Replete  Vit D panel pending  VTE Pharmacologic Prophylaxis:   Pharmacologic: Enoxaparin (Lovenox)  Mechanical VTE Prophylaxis in Place: Yes    Patient Centered Rounds: I have performed bedside rounds with nursing staff today  Discussions with Specialists or Other Care Team Provider: GI    Education and Discussions with Family / Patient: pt    Time Spent for Care: 30 minutes  More than 50% of total time spent on counseling and coordination of care as described above  Current Length of Stay: 16 day(s)    Current Patient Status: Inpatient   Certification Statement: The patient will continue to require additional inpatient hospital stay due to need to better tolerate PO    Discharge Plan / Estimated Discharge Date: when pt better tolerating PO - baseline eats 1 meal per day    Code Status: Level 1 - Full Code      Subjective:   Pt seen and examined  C/o vomitting with eating  Objective:     Vitals:   Temp (24hrs), Av °F (36 7 °C), Min:97 9 °F (36 6 °C), Max:98 1 °F (36 7 °C)    HR:  [78-83] 78  Resp:  [16-17] 17  BP: (110-115)/(71-75) 110/75  SpO2:  [96 %-97 %] 97 %  Body mass index is 22 79 kg/m²  Input and Output Summary (last 24 hours): Intake/Output Summary (Last 24 hours) at 17 1544  Last data filed at 17 1423   Gross per 24 hour   Intake              740 ml   Output             1850 ml   Net            -1110 ml       Physical Exam:     Physical Exam   Constitutional: She is oriented to person, place, and time   She appears well-developed and well-nourished  HENT:   Head: Normocephalic and atraumatic  Eyes: Conjunctivae and EOM are normal    Neck: Normal range of motion  Neck supple  Cardiovascular: Normal rate and regular rhythm  Pulmonary/Chest: Effort normal and breath sounds normal    Abdominal: Soft  Bowel sounds are normal  She exhibits no distension  There is tenderness  Musculoskeletal: Normal range of motion  She exhibits no edema  Neurological: She is alert and oriented to person, place, and time  Skin: Skin is warm and dry  Additional Data:     Labs:      Results from last 7 days  Lab Units 09/26/17  0443   WBC Thousand/uL 12 86*   HEMOGLOBIN g/dL 14 0   HEMATOCRIT % 41 9   PLATELETS Thousands/uL 337   NEUTROS PCT % 62   LYMPHS PCT % 30   MONOS PCT % 7   EOS PCT % 1       Results from last 7 days  Lab Units 09/26/17 0443  09/21/17  1907   SODIUM mmol/L 137  < >  --    POTASSIUM mmol/L 3 9  < >  --    CHLORIDE mmol/L 101  < >  --    CO2 mmol/L 29  < >  --    BUN mg/dL 14  < >  --    CREATININE mg/dL 0 61  < >  --    CALCIUM mg/dL 9 1  < >  --    TOTAL PROTEIN g/dL  --   --  7 0   GLUCOSE RANDOM mg/dL 75  < >  --    < > = values in this interval not displayed  * I Have Reviewed All Lab Data Listed Above  * Additional Pertinent Lab Tests Reviewed:  [facility] Admission Reviewed        Recent Cultures (last 7 days):           Last 24 Hours Medication List:     aspirin 325 mg Oral Daily   enoxaparin 40 mg Subcutaneous Daily   levothyroxine 75 mcg Oral Early Morning   lidocaine 2 patch Transdermal Daily   liothyronine 5 mcg Oral Early Morning   pantoprazole 40 mg Intravenous Q12H [facility]   [START ON 9/27/2017] predniSONE 40 mg Oral Daily   Followed by      Yary Cottrell ON 10/4/2017] predniSONE 30 mg Oral Daily   Followed by      Yary Cottrell ON 10/11/2017] predniSONE 20 mg Oral Daily   Followed by      Yary Cottrell ON 10/18/2017] predniSONE 10 mg Oral Daily   rifaximin 550 mg Oral [facility] saccharomyces boulardii 250 mg Oral BID        Today, Patient Was Seen By: Harriet Schroeder DO    ** Please Note: This note has been constructed using a voice recognition system   **

## 2017-09-27 LAB
ANION GAP SERPL CALCULATED.3IONS-SCNC: 8 MMOL/L (ref 4–13)
BUN SERPL-MCNC: 13 MG/DL (ref 5–25)
CALCIUM SERPL-MCNC: 9.1 MG/DL (ref 8.3–10.1)
CHLORIDE SERPL-SCNC: 99 MMOL/L (ref 100–108)
CO2 SERPL-SCNC: 30 MMOL/L (ref 21–32)
CREAT SERPL-MCNC: 0.72 MG/DL (ref 0.6–1.3)
ERYTHROCYTE [DISTWIDTH] IN BLOOD BY AUTOMATED COUNT: 13.1 % (ref 11.6–15.1)
GFR SERPL CREATININE-BSD FRML MDRD: 102 ML/MIN/1.73SQ M
GLUCOSE SERPL-MCNC: 101 MG/DL (ref 65–140)
HCT VFR BLD AUTO: 40.1 % (ref 34.8–46.1)
HGB BLD-MCNC: 13.8 G/DL (ref 11.5–15.4)
MAGNESIUM SERPL-MCNC: 2.4 MG/DL (ref 1.6–2.6)
MCH RBC QN AUTO: 32.8 PG (ref 26.8–34.3)
MCHC RBC AUTO-ENTMCNC: 34.4 G/DL (ref 31.4–37.4)
MCV RBC AUTO: 95 FL (ref 82–98)
PHOSPHATE SERPL-MCNC: 2.9 MG/DL (ref 2.7–4.5)
PLATELET # BLD AUTO: 343 THOUSANDS/UL (ref 149–390)
PMV BLD AUTO: 9.3 FL (ref 8.9–12.7)
POTASSIUM SERPL-SCNC: 4 MMOL/L (ref 3.5–5.3)
RBC # BLD AUTO: 4.21 MILLION/UL (ref 3.81–5.12)
SODIUM SERPL-SCNC: 137 MMOL/L (ref 136–145)
WBC # BLD AUTO: 14.37 THOUSAND/UL (ref 4.31–10.16)

## 2017-09-27 PROCEDURE — 83735 ASSAY OF MAGNESIUM: CPT | Performed by: GENERAL PRACTICE

## 2017-09-27 PROCEDURE — 80048 BASIC METABOLIC PNL TOTAL CA: CPT | Performed by: GENERAL PRACTICE

## 2017-09-27 PROCEDURE — C9113 INJ PANTOPRAZOLE SODIUM, VIA: HCPCS | Performed by: HOSPITALIST

## 2017-09-27 PROCEDURE — 84100 ASSAY OF PHOSPHORUS: CPT | Performed by: GENERAL PRACTICE

## 2017-09-27 PROCEDURE — 85027 COMPLETE CBC AUTOMATED: CPT | Performed by: GENERAL PRACTICE

## 2017-09-27 RX ADMIN — ASPIRIN 325 MG: 325 TABLET ORAL at 08:34

## 2017-09-27 RX ADMIN — LORAZEPAM 1 MG: 1 TABLET ORAL at 21:07

## 2017-09-27 RX ADMIN — TRAMADOL HYDROCHLORIDE 50 MG: 50 TABLET, COATED ORAL at 05:33

## 2017-09-27 RX ADMIN — RIFAXIMIN 550 MG: 550 TABLET ORAL at 13:02

## 2017-09-27 RX ADMIN — ONDANSETRON 4 MG: 2 INJECTION INTRAMUSCULAR; INTRAVENOUS at 11:56

## 2017-09-27 RX ADMIN — LEVOTHYROXINE SODIUM 75 MCG: 75 TABLET ORAL at 05:21

## 2017-09-27 RX ADMIN — OXYCODONE HYDROCHLORIDE 5 MG: 5 TABLET ORAL at 10:22

## 2017-09-27 RX ADMIN — DIPHENHYDRAMINE HCL 50 MG: 25 TABLET ORAL at 17:41

## 2017-09-27 RX ADMIN — Medication 250 MG: at 17:02

## 2017-09-27 RX ADMIN — Medication 250 MG: at 08:34

## 2017-09-27 RX ADMIN — DIPHENHYDRAMINE HCL 50 MG: 25 TABLET ORAL at 21:53

## 2017-09-27 RX ADMIN — TRAMADOL HYDROCHLORIDE 50 MG: 50 TABLET, COATED ORAL at 11:56

## 2017-09-27 RX ADMIN — RIFAXIMIN 550 MG: 550 TABLET ORAL at 05:21

## 2017-09-27 RX ADMIN — DIPHENHYDRAMINE HCL 50 MG: 25 TABLET ORAL at 12:49

## 2017-09-27 RX ADMIN — LIOTHYRONINE SODIUM 5 MCG: 5 TABLET ORAL at 05:21

## 2017-09-27 RX ADMIN — LIDOCAINE 2 PATCH: 50 PATCH CUTANEOUS at 08:34

## 2017-09-27 RX ADMIN — PANTOPRAZOLE SODIUM 40 MG: 40 INJECTION, POWDER, FOR SOLUTION INTRAVENOUS at 21:06

## 2017-09-27 RX ADMIN — PANTOPRAZOLE SODIUM 40 MG: 40 INJECTION, POWDER, FOR SOLUTION INTRAVENOUS at 08:34

## 2017-09-27 RX ADMIN — TRAMADOL HYDROCHLORIDE 50 MG: 50 TABLET, COATED ORAL at 21:06

## 2017-09-27 RX ADMIN — OXYCODONE HYDROCHLORIDE 5 MG: 5 TABLET ORAL at 17:44

## 2017-09-27 RX ADMIN — RIFAXIMIN 550 MG: 550 TABLET ORAL at 21:06

## 2017-09-27 RX ADMIN — DIPHENHYDRAMINE HCL 50 MG: 25 TABLET ORAL at 00:16

## 2017-09-27 RX ADMIN — LORAZEPAM 1 MG: 1 TABLET ORAL at 12:51

## 2017-09-27 RX ADMIN — ENOXAPARIN SODIUM 40 MG: 40 INJECTION SUBCUTANEOUS at 08:34

## 2017-09-27 RX ADMIN — DIPHENHYDRAMINE HCL 50 MG: 25 TABLET ORAL at 08:34

## 2017-09-27 RX ADMIN — PREDNISONE 40 MG: 20 TABLET ORAL at 08:34

## 2017-09-27 RX ADMIN — DIPHENHYDRAMINE HCL 50 MG: 25 TABLET ORAL at 04:22

## 2017-09-27 NOTE — PROGRESS NOTES
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  She may eventually be a candidate for sphincteroplasty or choledochoduodenostomy however, this is a significant operation and would require more workup including manometry prior to any intervention    Plan:  Diet as tolerated  No surgical intervention at this point  Follow up PRN as outpatient if desires evaluation for operative intervention for sphincteroplasty    Subjective/Objective     Subjective: Persistent abdominal pain, particularly post pradndial in RUQ     Objective:     Blood pressure 104/72, pulse 84, temperature 98 4 °F (36 9 °C), temperature source Oral, resp  rate 18, height 5' 5 25" (1 657 m), weight 62 6 kg (138 lb), SpO2 97 %, not currently breastfeeding  ,Body mass index is 22 79 kg/m²  Intake/Output Summary (Last 24 hours) at 09/27/17 1920  Last data filed at 09/27/17 1300   Gross per 24 hour   Intake              120 ml   Output             2050 ml   Net            -1930 ml       Invasive Devices     Peripheral Intravenous Line            Peripheral IV 09/25/17 Left Wrist 1 day                Physical Exam:   NAD  CV RRR  Pulm CTAB  Abdmen: soft, NTND  Ext: intact, no CCE    Lab, Imaging and other studies:  I have personally reviewed pertinent lab results    , CBC:   Lab Results   Component Value Date    WBC 14 37 (H) 09/27/2017    HGB 13 8 09/27/2017    HCT 40 1 09/27/2017    MCV 95 09/27/2017     09/27/2017    MCH 32 8 09/27/2017    MCHC 34 4 09/27/2017    RDW 13 1 09/27/2017    MPV 9 3 09/27/2017   , CMP:   Lab Results   Component Value Date     09/27/2017    K 4 0 09/27/2017    CL 99 (L) 09/27/2017    CO2 30 09/27/2017    ANIONGAP 8 09/27/2017    BUN 13 09/27/2017    CREATININE 0 72 09/27/2017    GLUCOSE 101 09/27/2017 CALCIUM 9 1 09/27/2017    EGFR 102 09/27/2017

## 2017-09-27 NOTE — PROGRESS NOTES
Gastroenterology Progress Note   Unit/Bed#: [facility] 823-01   Encounter: [id number]    Celina Sarmiento 38 y o  female   MRN: [id number]  Hospital Stay Days: 17    Assessment/Plan:    1  Right upper quadrant abdominal pain   Likely functional in nature and has been difficult to treat   Multiple allergies and sensitivities including corn starch which prohibits TCA use   Small-bowel follow-through study was normal   No clear indication for capsule endoscopy and increased risk of capsule retention given previous surgeries      Explained to patient that from a GI standpoint, unable to offer any further studies or intervention   I recommended seeking a 2nd opinion at [facility] where she has been seen before   Her immunologist is based at [facility] - recommended discussing desensitization for TCA   Patient requested that she be transferred if that is our recommendation   Patient also requested possible surgical evaluation of abdominal pain given history of endometriosis   I explained to the patient that I would discuss with the primary team as well as with my attending     For now, would continue with current management   If surgical evaluation and/or transfer is not an option, will need multidisciplinary meeting to discuss plan      Subjective:   Patient seen and examined  No acute events overnight  Reports ongoing abdominal pain  Patient states that she has not been eating due to exacerbation of abdominal pain  Even without eating, patient experiences chronic abdominal pain in the right upper quadrant  Also reports issues with bowel movements-states it takes her 4 and 0 5 hours the past a bowel movement  States she strains initially to pass bowel movement, and then everything opens up and she has loose stool/diarrhea that follows  Reports subjective fevers  Patient expresses frustration given all her tests have come back negative      Vitals: Temp (24hrs), Av 3 °F (36 8 °C), Min:98 1 °F (36 7 °C), Max:98 6 °F (37 °C)   Temperature: 98 3 °F (36 8 °C)  Vitals:    09/26/17 0713 09/26/17 1500 09/26/17 2300 09/27/17 0700   BP: 110/75 117/88 101/65 92/74   Pulse: 78 (!) 109 90 79   Resp: 17 16 16 18   Temp: 97 9 °F (36 6 °C) 98 1 °F (36 7 °C) 98 6 °F (37 °C) 98 3 °F (36 8 °C)   TempSrc: Oral Oral Oral Oral   SpO2: 97% 97% 96% 96%   Weight:       Height:          Body mass index is 22 79 kg/m²  I/O last 24 hours: In: 56 [P O :740]  Out: 2450 [Urine:2450]    Physical Exam   Constitutional: She is oriented to person, place, and time  She appears well-developed  No distress  HENT:   Head: Normocephalic and atraumatic  Nose: Nose normal    Mouth/Throat: Oropharynx is clear and moist    Eyes: EOM are normal  No scleral icterus  Neck: Neck supple  No JVD present  No tracheal deviation present  Cardiovascular: Normal rate, regular rhythm, normal heart sounds and intact distal pulses  Pulmonary/Chest: Effort normal and breath sounds normal  No respiratory distress  She has no wheezes  She has no rales  Abdominal: Soft  Bowel sounds are normal  She exhibits no distension and no mass  There is tenderness (RUQ)  There is no rebound and no guarding  Musculoskeletal: She exhibits no edema or deformity  Neurological: She is alert and oriented to person, place, and time  No cranial nerve deficit  Skin: Skin is warm and dry  She is not diaphoretic  No erythema  No pallor  Psychiatric: Her behavior is normal  Judgment and thought content normal    Tearful       Invasive Devices     Peripheral Intravenous Line            Peripheral IV 09/25/17 Left Wrist 1 day                Labs: I have personally reviewed pertinent reports      Recent Results (from the past 24 hour(s))   CBC    Collection Time: 09/27/17  5:07 AM   Result Value Ref Range    WBC 14 37 (H) 4 31 - 10 16 Thousand/uL    RBC 4 21 3 81 - 5 12 Million/uL    Hemoglobin 13 8 11 5 - 15 4 g/dL    Hematocrit 40 1 34 8 - 46 1 %    MCV 95 82 - 98 fL    MCH 32 8 26 8 - 34 3 pg    MCHC 34 4 31 4 - 37 4 g/dL    RDW 13 1 11 6 - 15 1 %    Platelets 710 764 - 033 Thousands/uL    MPV 9 3 8 9 - 12 7 fL   Basic metabolic panel    Collection Time: 09/27/17  5:07 AM   Result Value Ref Range    Sodium 137 136 - 145 mmol/L    Potassium 4 0 3 5 - 5 3 mmol/L    Chloride 99 (L) 100 - 108 mmol/L    CO2 30 21 - 32 mmol/L    Anion Gap 8 4 - 13 mmol/L    BUN 13 5 - 25 mg/dL    Creatinine 0 72 0 60 - 1 30 mg/dL    Glucose 101 65 - 140 mg/dL    Calcium 9 1 8 3 - 10 1 mg/dL    eGFR 102 ml/min/1 73sq m   Magnesium    Collection Time: 09/27/17  5:07 AM   Result Value Ref Range    Magnesium 2 4 1 6 - 2 6 mg/dL   Phosphorus    Collection Time: 09/27/17  5:07 AM   Result Value Ref Range    Phosphorus 2 9 2 7 - 4 5 mg/dL     Radiology Results: I have personally reviewed pertinent reports  Mri Abdomen Wo Contrast And Mrcp    Result Date: 9/13/2017  Impression: 1  Status post cholecystectomy with unchanged central intrahepatic and common bile duct dilatation  Common bile duct measures up to 1 3 cm  2   Filling defect seen within the cystic duct stump, stones versus sludge  No filling defects seen within the common bile duct  Workstation performed: [id number]     Fl Ercp Biliary Only    Result Date: 9/16/2017  Impression: Dilated common bile duct without discrete filling defect Workstation performed: [id number]     Other Diagnostic Testing: I have personally reviewed pertinent reports        Active Meds:   Current Facility-Administered Medications   Medication Dose Route Frequency    acetaminophen (TYLENOL) tablet 650 mg  650 mg Oral Q6H PRN    aspirin tablet 325 mg  325 mg Oral Daily    diphenhydrAMINE (BENADRYL) tablet 50 mg  50 mg Oral Q4H PRN    enoxaparin (LOVENOX) subcutaneous injection 40 mg  40 mg Subcutaneous Daily    hyoscyamine (LEVSIN/SL) SL tablet 0 125 mg  0 125 mg Sublingual Q4H PRN    levothyroxine tablet 75 mcg  75 mcg Oral Early Morning    lidocaine (LIDODERM) 5 % patch 2 patch  2 patch Transdermal Daily    liothyronine (CYTOMEL) tablet 5 mcg  5 mcg Oral Early Morning    LORazepam (ATIVAN) tablet 1 mg  1 mg Oral Q6H PRN    ondansetron (ZOFRAN) injection 4 mg  4 mg Intravenous Q6H PRN    oxyCODONE (ROXICODONE) IR tablet 5 mg  5 mg Oral Q4H PRN    pantoprazole (PROTONIX) injection 40 mg  40 mg Intravenous Q12H [address]    predniSONE tablet 40 mg  40 mg Oral Daily    Followed by   Genevive Emms ON 10/4/2017] predniSONE tablet 30 mg  30 mg Oral Daily    Followed by   Genevive Emms ON 10/11/2017] predniSONE tablet 20 mg  20 mg Oral Daily    Followed by   Genevive Emms ON 10/18/2017] predniSONE tablet 10 mg  10 mg Oral Daily    rifaximin (XIFAXAN) tablet 550 mg  550 mg Oral Q8H [address]    saccharomyces boulardii (FLORASTOR) capsule 250 mg  250 mg Oral BID    traMADol (ULTRAM) tablet 50 mg  50 mg Oral Q6H PRN       VTE Pharmacologic Prophylaxis: Enoxaparin (Lovenox)  VTE Mechanical Prophylaxis: sequential compression device    HARISH Nelson    [address] [facility]  Internal Medicine Resident PGY-1  Pager #: [phone]

## 2017-09-27 NOTE — PROGRESS NOTES
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  Recurrent febrile illness w/o clear sources of infection  - she had CT scan, MRI and MRCP while in here all with chronic finding of sphincterotomy and CBD dilation that are unchanged and maybe small stone or sludge in gallbladder stump  - she did have EGD/colonoscopy on 9/12 and biopsy findings are for chronic inactive gastritis  - ERCP on 9/16 with widely open Oddi  - zosyn x 10 days with recent 7 days IV abx during prior admission and all negative Cx and transient fever without although pain resolution  - no clinical or radiographic evidence of cholangitis   - 9/21 I spoke with [facility] and d/w him case and at this time is felt that [address] will offer same care and no new procedures  - pressure at Oddi would not be performed for 2 reasons-----> patient has already sphincterotomy and high risk of severe pancreatitis in women  - she does understand that [facility] has declined transfer  - patient saw rheumatologist     -- ESR, CRP, C3/C4   IgA/M/G are WNL    -- COREY, anticardiolipin, thyroid microsomial, LENA, RF, SSA, SSB are negative, other studies are pending and will be followed as OP  - FL small bowel WNL   -- as per consultant, patient pain might be related to mastocytes disease and less likely vasculitis     -- she went to [facility] for her disease and could not tolerate IV therapy 2/2 severe abdominal pain     -- FU with rheumatology as OP  - d/w pharmacy several times during this week----> unfortunately no TCA available corn starch free and even compound might need to be binded by corn starch------> d/w GI, patient not candidate for TCA  - started lidocaine patch RUQ on  and increased to 2 today  -d/w GI   GI at this time recommends no further workup  Pt appears at her baseline   -Pt requested surgical eval for an ex-lap  I consulted surgery, although I doubt pt will benefit from this   -if surgery recs no further workup, I will d/c home and rec if pain worsens she go to ER at [facility] or [facility] for a second opinion, as GI at [facility] believes this is functional GI disorder         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: On slow prednisone taper  On 40 mg daily starting today - taper 10 mg every week      Reported UC dx:   Not proven on our colon  On steroids     Hypophos:   Phos WNL today  Vit D panel pending  VTE Pharmacologic Prophylaxis:   Pharmacologic: Enoxaparin (Lovenox)  Mechanical VTE Prophylaxis in Place: Yes    Patient Centered Rounds: I have performed bedside rounds with nursing staff today  Discussions with Specialists or Other Care Team Provider: GI and surg    Education and Discussions with Family / Patient: pt    Time Spent for Care: 30 minutes  More than 50% of total time spent on counseling and coordination of care as described above  Current Length of Stay: 17 day(s)    Current Patient Status: Inpatient   Certification Statement: The patient will continue to require additional inpatient hospital stay due to surgical eval, which is pending    Discharge Plan / Estimated Discharge Date: Plan on d/c if GI and surgery have no further options    Code Status: Level 1 - Full Code      Subjective:   Pt seen and examined  Still c/o abdominal pain and nausea and diarrhea      Objective:     Vitals:   Temp (24hrs), Av 3 °F (36 8 °C), Min:98 1 °F (36 7 °C), Max:98 6 °F (37 °C)    HR:  [] 79  Resp:  [16-18] 18  BP: ()/(65-88) 92/74  SpO2:  [96 %-97 %] 96 %  Body mass index is 22 79 kg/m²  Input and Output Summary (last 24 hours): Intake/Output Summary (Last 24 hours) at 09/27/17 1117  Last data filed at 09/27/17 0507   Gross per 24 hour   Intake              340 ml   Output             2450 ml   Net            -2110 ml       Physical Exam:     Physical Exam   Constitutional: She is oriented to person, place, and time  She appears well-developed and well-nourished  HENT:   Head: Normocephalic and atraumatic  Eyes: Conjunctivae and EOM are normal    Neck: Normal range of motion  Neck supple  Cardiovascular: Normal rate and regular rhythm  Pulmonary/Chest: Effort normal and breath sounds normal  She has no wheezes  She has no rales  Abdominal: Soft  Bowel sounds are normal  She exhibits no distension  There is tenderness (diffuse)  Musculoskeletal: Normal range of motion  She exhibits no edema  Neurological: She is alert and oriented to person, place, and time  Skin: Skin is warm and dry  Additional Data:     Labs:      Results from last 7 days  Lab Units 09/27/17  0507 09/26/17  0443   WBC Thousand/uL 14 37* 12 86*   HEMOGLOBIN g/dL 13 8 14 0   HEMATOCRIT % 40 1 41 9   PLATELETS Thousands/uL 343 337   NEUTROS PCT %  --  62   LYMPHS PCT %  --  30   MONOS PCT %  --  7   EOS PCT %  --  1       Results from last 7 days  Lab Units 09/27/17  0507  09/21/17  1907   SODIUM mmol/L 137  < >  --    POTASSIUM mmol/L 4 0  < >  --    CHLORIDE mmol/L 99*  < >  --    CO2 mmol/L 30  < >  --    BUN mg/dL 13  < >  --    CREATININE mg/dL 0 72  < >  --    CALCIUM mg/dL 9 1  < >  --    TOTAL PROTEIN g/dL  --   --  7 0   GLUCOSE RANDOM mg/dL 101  < >  --    < > = values in this interval not displayed  * I Have Reviewed All Lab Data Listed Above  * Additional Pertinent Lab Tests Reviewed:  [facility]      Recent Cultures (last 7 days):           Last 24 Hours Medication List:     aspirin 325 mg Oral Daily   enoxaparin 40 mg Subcutaneous Daily   levothyroxine 75 mcg Oral Early Morning   lidocaine 2 patch Transdermal Daily   liothyronine 5 mcg Oral Early Morning   pantoprazole 40 mg Intravenous Q12H [address]   predniSONE 40 mg Oral Daily   Followed by      Gera Baez ON 10/4/2017] predniSONE 30 mg Oral Daily   Followed by      Gera Baez ON 10/11/2017] predniSONE 20 mg Oral Daily   Followed by      Gera Baez ON 10/18/2017] predniSONE 10 mg Oral Daily   rifaximin 550 mg Oral Q8H [address]   saccharomyces boulardii 250 mg Oral BID        Today, Patient Was Seen By: Yahir Barraza DO    ** Please Note: This note has been constructed using a voice recognition system   **

## 2017-09-28 PROBLEM — R30.0 DYSURIA: Status: ACTIVE | Noted: 2017-09-28

## 2017-09-28 LAB
25(OH)D2 SERPL-MCNC: <1 NG/ML
25(OH)D3 SERPL-MCNC: 24 NG/ML
25(OH)D3+25(OH)D2 SERPL-MCNC: 24 NG/ML
BILIRUB UR QL STRIP: NEGATIVE
CLARITY UR: CLEAR
COLOR UR: YELLOW
GLUCOSE UR STRIP-MCNC: NEGATIVE MG/DL
HGB UR QL STRIP.AUTO: NEGATIVE
KETONES UR STRIP-MCNC: NEGATIVE MG/DL
LEUKOCYTE ESTERASE UR QL STRIP: NEGATIVE
NITRITE UR QL STRIP: NEGATIVE
PH UR STRIP.AUTO: 7 [PH] (ref 4.5–8)
PROT UR STRIP-MCNC: NEGATIVE MG/DL
SP GR UR STRIP.AUTO: 1.01 (ref 1–1.03)
UROBILINOGEN UR QL STRIP.AUTO: 0.2 E.U./DL

## 2017-09-28 PROCEDURE — C9113 INJ PANTOPRAZOLE SODIUM, VIA: HCPCS | Performed by: HOSPITALIST

## 2017-09-28 PROCEDURE — 81003 URINALYSIS AUTO W/O SCOPE: CPT | Performed by: GENERAL PRACTICE

## 2017-09-28 RX ORDER — PANTOPRAZOLE SODIUM 40 MG/1
40 TABLET, DELAYED RELEASE ORAL
Status: DISCONTINUED | OUTPATIENT
Start: 2017-09-28 | End: 2017-09-29 | Stop reason: HOSPADM

## 2017-09-28 RX ADMIN — OXYCODONE HYDROCHLORIDE 5 MG: 5 TABLET ORAL at 21:11

## 2017-09-28 RX ADMIN — DIPHENHYDRAMINE HCL 50 MG: 25 TABLET ORAL at 21:11

## 2017-09-28 RX ADMIN — DIPHENHYDRAMINE HCL 50 MG: 25 TABLET ORAL at 17:35

## 2017-09-28 RX ADMIN — PREDNISONE 40 MG: 20 TABLET ORAL at 09:48

## 2017-09-28 RX ADMIN — OXYCODONE HYDROCHLORIDE 5 MG: 5 TABLET ORAL at 09:48

## 2017-09-28 RX ADMIN — LIDOCAINE 2 PATCH: 50 PATCH CUTANEOUS at 09:49

## 2017-09-28 RX ADMIN — LORAZEPAM 1 MG: 1 TABLET ORAL at 23:35

## 2017-09-28 RX ADMIN — Medication 1000 UNITS: at 21:13

## 2017-09-28 RX ADMIN — LEVOTHYROXINE SODIUM 75 MCG: 75 TABLET ORAL at 05:03

## 2017-09-28 RX ADMIN — RIFAXIMIN 550 MG: 550 TABLET ORAL at 14:34

## 2017-09-28 RX ADMIN — PANTOPRAZOLE SODIUM 40 MG: 40 INJECTION, POWDER, FOR SOLUTION INTRAVENOUS at 09:48

## 2017-09-28 RX ADMIN — RIFAXIMIN 550 MG: 550 TABLET ORAL at 05:03

## 2017-09-28 RX ADMIN — DIPHENHYDRAMINE HCL 50 MG: 25 TABLET ORAL at 11:28

## 2017-09-28 RX ADMIN — PANTOPRAZOLE SODIUM 40 MG: 40 TABLET, DELAYED RELEASE ORAL at 17:35

## 2017-09-28 RX ADMIN — ASPIRIN 325 MG: 325 TABLET ORAL at 09:48

## 2017-09-28 RX ADMIN — TRAMADOL HYDROCHLORIDE 50 MG: 50 TABLET, COATED ORAL at 23:35

## 2017-09-28 RX ADMIN — OXYCODONE HYDROCHLORIDE 5 MG: 5 TABLET ORAL at 02:20

## 2017-09-28 RX ADMIN — ENOXAPARIN SODIUM 40 MG: 40 INJECTION SUBCUTANEOUS at 09:48

## 2017-09-28 RX ADMIN — Medication 250 MG: at 17:35

## 2017-09-28 RX ADMIN — DIPHENHYDRAMINE HCL 50 MG: 25 TABLET ORAL at 02:21

## 2017-09-28 RX ADMIN — Medication 250 MG: at 09:48

## 2017-09-28 RX ADMIN — ONDANSETRON 4 MG: 2 INJECTION INTRAMUSCULAR; INTRAVENOUS at 09:54

## 2017-09-28 RX ADMIN — OXYCODONE HYDROCHLORIDE 5 MG: 5 TABLET ORAL at 17:35

## 2017-09-28 RX ADMIN — RIFAXIMIN 550 MG: 550 TABLET ORAL at 21:11

## 2017-09-28 RX ADMIN — LORAZEPAM 1 MG: 1 TABLET ORAL at 17:35

## 2017-09-28 RX ADMIN — TRAMADOL HYDROCHLORIDE 50 MG: 50 TABLET, COATED ORAL at 14:34

## 2017-09-28 RX ADMIN — LORAZEPAM 1 MG: 1 TABLET ORAL at 09:54

## 2017-09-28 RX ADMIN — LIOTHYRONINE SODIUM 5 MCG: 5 TABLET ORAL at 06:31

## 2017-09-28 NOTE — SOCIAL WORK
Escalated team meeting held with the following individuals present: Dr Melida Jimenez [facility] attending), Dr Brooks Hines (Surgery Resident), Dr Danii Majano (GI Resident), Lizbeth Ch (CM) and Orlando Hammer (CSWS)  Patient expressed her concerns that she feels there is "something wrong " She reports that she feels that there is something that is not showing up on tests and she would like exploratory surgery to determine if she is correct  Discussed concerns regarding having surgery and the exploratory surgery may not find anything since patient had an MRI  Discussed how although patient continues to have pain and poor appetite, she is at baseline  Discussed medical criteria to remain inpatient  And that patient may receive a bill since there are concerns that she is at baseline and can return home and this has been consistent for the past few days  Patient continues to want surgery  Dr Brooks Hines will need to speak with Dr Ashli Watson (Surgery Attending) to discuss if he is agreeable with performing surgery  Surgery will follow up with [facility] attending and patient

## 2017-09-28 NOTE — PROGRESS NOTES
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  Recurrent febrile illness w/o clear sources of infection  - she had CT scan, MRI and MRCP while in here all with chronic finding of sphincterotomy and CBD dilation that are unchanged and maybe small stone or sludge in gallbladder stump  - she did have EGD/colonoscopy on 9/12 and biopsy findings are for chronic inactive gastritis  - ERCP on 9/16 with widely open Oddi  - zosyn x 10 days with recent 7 days IV abx during prior admission and all negative Cx and transient fever without although pain resolution  - no clinical or radiographic evidence of cholangitis   - 9/21 SLIM spoke with [facility] and d/w him case and at this time is felt that [address] will offer same care and no new procedures  - pressure at Oddi would not be performed for 2 reasons-----> patient has already sphincterotomy and high risk of severe pancreatitis in women  - she does understand that [facility] has declined transfer  - patient saw rheumatologist     -- ESR, CRP, C3/C4   IgA/M/G are WNL    -- COREY, anticardiolipin, thyroid microsomial, LENA, RF, SSA, SSB are negative, other studies are pending and will be followed as OP  - FL small bowel WNL   -- as per consultant, patient pain might be related to mastocytes disease and less likely vasculitis     -- she went to [place] for her disease and could not tolerate IV therapy 2/2 severe abdominal pain     -- FU with rheumatology as OP  - d/w pharmacy several times during this week----> unfortunately no TCA available corn starch free and even compound might need to be binded by corn starch------> d/w GI, patient not candidate for TCA  - started lidocaine patch RUQ on 9/24 and increased to 2 today  -d/w GI   GI at this time recommends no further workup  Pt appears at her baseline  Likely a functional GI disorder  -Pt requested surgical eval for an ex-lap  Surgery believes this has more risk than benefit  We had meeting today with surgical resident, GI resident, and CM  Pt still believes that an ex-lap will show her problem, but she already has had a CT and MRI which are very sensitive test     -tomorrow, the surgery attending and I will talk to the patient  If the surgery attending tells he will not do ex-lap, she will be discharged as she is at baseline  If her pain returns, rec she seek second opinion at [facility] as she has had surgery there or [place] as her allergist is there  Explained that I cannot transfer her as these places do not offer a higher level of care           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: On slow prednisone taper   On 40 mg daily - taper 10 mg every week      Reported UC dx:   Not proven on our colon   On steroids     Hypophos/Vit D deficiency:   Vit D panel shows low vit D  Started her on a daily supplement  Will check phos tomorrow and replete if needed  She does not need to go home on phos supplement as I suspect a daily vit d supplement will resolve the issue  VTE Pharmacologic Prophylaxis:   Pharmacologic: Enoxaparin (Lovenox)  Mechanical VTE Prophylaxis in Place: Yes    Patient Centered Rounds: I have performed bedside rounds with nursing staff today  Discussions with Specialists or Other Care Team Provider: d/w Dr Navarrete [facility] resident Dr Evelyn Mora, and surgical resident Dr Fernando Benoit  Had case meeting today  Education and Discussions with Family / Patient: pt    Time Spent for Care: 30 minutes    More than 50% of total time spent on counseling and coordination of care as described above  Current Length of Stay: 18 day(s)    Current Patient Status: Inpatient   Certification Statement: The patient will continue to require additional inpatient hospital stay due to need for dc planning    Discharge Plan / Estimated Discharge Date: d/c tomorrow if surgery definitively declines intervention    Code Status: Level 1 - Full Code      Subjective:   Pt seen and examined  C/o dysuria  Says she is still having pain with eating  Objective:     Vitals:   Temp (24hrs), Av 6 °F (37 °C), Min:98 2 °F (36 8 °C), Max:99 °F (37 2 °C)    HR:  [] 111  Resp:  [18] 18  BP: ()/(53-71) 113/71  SpO2:  [95 %-97 %] 97 %  Body mass index is 23 48 kg/m²  Input and Output Summary (last 24 hours): Intake/Output Summary (Last 24 hours) at 17 1714  Last data filed at 17 1300   Gross per 24 hour   Intake              420 ml   Output             1800 ml   Net            -1380 ml       Physical Exam:     Physical Exam   Constitutional: She is oriented to person, place, and time  She appears well-developed and well-nourished  HENT:   Head: Normocephalic and atraumatic  Eyes: Conjunctivae and EOM are normal    Neck: Normal range of motion  Neck supple  Cardiovascular: Normal rate and regular rhythm  Pulmonary/Chest: Effort normal and breath sounds normal    Abdominal: Soft  Bowel sounds are normal  She exhibits no distension  There is tenderness  Musculoskeletal: Normal range of motion  She exhibits no edema  Neurological: She is alert and oriented to person, place, and time  Skin: Skin is warm and dry           Additional Data:     Labs:      Results from last 7 days  Lab Units 17  0507 17  0443   WBC Thousand/uL 14 37* 12 86*   HEMOGLOBIN g/dL 13 8 14 0   HEMATOCRIT % 40 1 41 9   PLATELETS Thousands/uL 343 337   NEUTROS PCT %  --  62   LYMPHS PCT %  --  30   MONOS PCT %  --  7 EOS PCT %  --  1       Results from last 7 days  Lab Units 09/27/17  0507  09/21/17  1907   SODIUM mmol/L 137  < >  --    POTASSIUM mmol/L 4 0  < >  --    CHLORIDE mmol/L 99*  < >  --    CO2 mmol/L 30  < >  --    BUN mg/dL 13  < >  --    CREATININE mg/dL 0 72  < >  --    CALCIUM mg/dL 9 1  < >  --    TOTAL PROTEIN g/dL  --   --  7 0   GLUCOSE RANDOM mg/dL 101  < >  --    < > = values in this interval not displayed  * I Have Reviewed All Lab Data Listed Above  * Additional Pertinent Lab Tests Reviewed: [facility] Admission Reviewed        Recent Cultures (last 7 days):           Last 24 Hours Medication List:     aspirin 325 mg Oral Daily   cholecalciferol 1,000 Units Oral Daily   enoxaparin 40 mg Subcutaneous Daily   levothyroxine 75 mcg Oral Early Morning   lidocaine 2 patch Transdermal Daily   liothyronine 5 mcg Oral Early Morning   pantoprazole 40 mg Oral BID AC   predniSONE 40 mg Oral Daily   Followed by      Gina Fang ON 10/4/2017] predniSONE 30 mg Oral Daily   Followed by      Gina Fang ON 10/11/2017] predniSONE 20 mg Oral Daily   Followed by      Gina Fang ON 10/18/2017] predniSONE 10 mg Oral Daily   rifaximin 550 mg Oral Q8H [address]   saccharomyces boulardii 250 mg Oral BID        Today, Patient Was Seen By: Cynthia Luque DO    ** Please Note: This note has been constructed using a voice recognition system   **

## 2017-09-29 VITALS
SYSTOLIC BLOOD PRESSURE: 105 MMHG | RESPIRATION RATE: 18 BRPM | WEIGHT: 142.2 LBS | HEIGHT: 65 IN | BODY MASS INDEX: 23.69 KG/M2 | TEMPERATURE: 97.9 F | HEART RATE: 92 BPM | OXYGEN SATURATION: 97 % | DIASTOLIC BLOOD PRESSURE: 71 MMHG

## 2017-09-29 LAB
ALDOST SERPL-MCNC: 1.2 NG/DL (ref 0–30)
ANION GAP SERPL CALCULATED.3IONS-SCNC: 8 MMOL/L (ref 4–13)
BUN SERPL-MCNC: 12 MG/DL (ref 5–25)
CALCIUM SERPL-MCNC: 9 MG/DL (ref 8.3–10.1)
CHLORIDE SERPL-SCNC: 99 MMOL/L (ref 100–108)
CO2 SERPL-SCNC: 29 MMOL/L (ref 21–32)
CREAT SERPL-MCNC: 0.66 MG/DL (ref 0.6–1.3)
ERYTHROCYTE [DISTWIDTH] IN BLOOD BY AUTOMATED COUNT: 12.8 % (ref 11.6–15.1)
GFR SERPL CREATININE-BSD FRML MDRD: 108 ML/MIN/1.73SQ M
GLUCOSE SERPL-MCNC: 87 MG/DL (ref 65–140)
HCT VFR BLD AUTO: 42.3 % (ref 34.8–46.1)
HGB BLD-MCNC: 14 G/DL (ref 11.5–15.4)
MAGNESIUM SERPL-MCNC: 2.3 MG/DL (ref 1.6–2.6)
MCH RBC QN AUTO: 32 PG (ref 26.8–34.3)
MCHC RBC AUTO-ENTMCNC: 33.1 G/DL (ref 31.4–37.4)
MCV RBC AUTO: 97 FL (ref 82–98)
PHOSPHATE SERPL-MCNC: 2.9 MG/DL (ref 2.7–4.5)
PLATELET # BLD AUTO: 371 THOUSANDS/UL (ref 149–390)
PMV BLD AUTO: 9.2 FL (ref 8.9–12.7)
POTASSIUM SERPL-SCNC: 3.7 MMOL/L (ref 3.5–5.3)
RBC # BLD AUTO: 4.37 MILLION/UL (ref 3.81–5.12)
RENIN PLAS-CCNC: 0.24 NG/ML/HR (ref 0.17–5.38)
SODIUM SERPL-SCNC: 136 MMOL/L (ref 136–145)
WBC # BLD AUTO: 14.42 THOUSAND/UL (ref 4.31–10.16)

## 2017-09-29 PROCEDURE — 84100 ASSAY OF PHOSPHORUS: CPT | Performed by: GENERAL PRACTICE

## 2017-09-29 PROCEDURE — 80048 BASIC METABOLIC PNL TOTAL CA: CPT | Performed by: GENERAL PRACTICE

## 2017-09-29 PROCEDURE — 85027 COMPLETE CBC AUTOMATED: CPT | Performed by: GENERAL PRACTICE

## 2017-09-29 PROCEDURE — 83735 ASSAY OF MAGNESIUM: CPT | Performed by: GENERAL PRACTICE

## 2017-09-29 RX ORDER — SACCHAROMYCES BOULARDII 250 MG
250 CAPSULE ORAL 2 TIMES DAILY
Qty: 60 CAPSULE | Refills: 0 | Status: SHIPPED | OUTPATIENT
Start: 2017-09-29 | End: 2018-05-21

## 2017-09-29 RX ORDER — TRAMADOL HYDROCHLORIDE 50 MG/1
50 TABLET ORAL EVERY 6 HOURS PRN
Qty: 30 TABLET | Refills: 0 | Status: SHIPPED | OUTPATIENT
Start: 2017-09-29 | End: 2017-10-09

## 2017-09-29 RX ORDER — PREDNISONE 10 MG/1
TABLET ORAL
Qty: 58 TABLET | Refills: 0 | Status: SHIPPED | OUTPATIENT
Start: 2017-09-30 | End: 2017-12-06

## 2017-09-29 RX ORDER — OXYCODONE HYDROCHLORIDE 5 MG/1
5 TABLET ORAL EVERY 4 HOURS PRN
Qty: 30 TABLET | Refills: 0 | Status: SHIPPED | OUTPATIENT
Start: 2017-09-29 | End: 2017-10-09

## 2017-09-29 RX ORDER — PANTOPRAZOLE SODIUM 40 MG/1
40 TABLET, DELAYED RELEASE ORAL
Qty: 30 TABLET | Refills: 0 | Status: SHIPPED | OUTPATIENT
Start: 2017-09-29 | End: 2017-12-06

## 2017-09-29 RX ORDER — LIDOCAINE 50 MG/G
2 PATCH TOPICAL DAILY
Qty: 30 PATCH | Refills: 0 | Status: SHIPPED | OUTPATIENT
Start: 2017-09-30 | End: 2017-12-06

## 2017-09-29 RX ADMIN — DIPHENHYDRAMINE HCL 50 MG: 25 TABLET ORAL at 04:34

## 2017-09-29 RX ADMIN — DIPHENHYDRAMINE HCL 50 MG: 25 TABLET ORAL at 11:01

## 2017-09-29 RX ADMIN — Medication 1000 UNITS: at 10:45

## 2017-09-29 RX ADMIN — LEVOTHYROXINE SODIUM 75 MCG: 75 TABLET ORAL at 05:53

## 2017-09-29 RX ADMIN — TRAMADOL HYDROCHLORIDE 50 MG: 50 TABLET, COATED ORAL at 06:39

## 2017-09-29 RX ADMIN — OXYCODONE HYDROCHLORIDE 5 MG: 5 TABLET ORAL at 04:34

## 2017-09-29 RX ADMIN — LIDOCAINE 2 PATCH: 50 PATCH CUTANEOUS at 10:44

## 2017-09-29 RX ADMIN — LORAZEPAM 1 MG: 1 TABLET ORAL at 11:02

## 2017-09-29 RX ADMIN — ASPIRIN 325 MG: 325 TABLET ORAL at 10:43

## 2017-09-29 RX ADMIN — OXYCODONE HYDROCHLORIDE 5 MG: 5 TABLET ORAL at 11:02

## 2017-09-29 RX ADMIN — PANTOPRAZOLE SODIUM 40 MG: 40 TABLET, DELAYED RELEASE ORAL at 05:53

## 2017-09-29 RX ADMIN — LIOTHYRONINE SODIUM 5 MCG: 5 TABLET ORAL at 05:53

## 2017-09-29 RX ADMIN — RIFAXIMIN 550 MG: 550 TABLET ORAL at 05:53

## 2017-09-29 RX ADMIN — Medication 250 MG: at 10:43

## 2017-09-29 RX ADMIN — PREDNISONE 40 MG: 20 TABLET ORAL at 10:44

## 2017-09-29 NOTE — SOCIAL WORK
CM spoke with Dr Fernando Jacobo and had a meeting with the pt and surgery   Pt agreeable to go home today and  will  in afternoon   No cm needs

## 2017-09-29 NOTE — PLAN OF CARE
DISCHARGE PLANNING     Discharge to home or other facility with appropriate resources Adequate for Discharge        DISCHARGE PLANNING - CARE MANAGEMENT     Discharge to post-acute care or home with appropriate resources Adequate for Discharge        GASTROINTESTINAL - ADULT     Minimal or absence of nausea and/or vomiting Adequate for Discharge     Maintains or returns to baseline bowel function Adequate for Discharge     Maintains adequate nutritional intake Adequate for Discharge        INFECTION - ADULT     Absence or prevention of progression during hospitalization Adequate for Discharge     Absence of fever/infection during neutropenic period Adequate for Discharge        Knowledge Deficit     Patient/family/caregiver demonstrates understanding of disease process, treatment plan, medications, and discharge instructions Adequate for Discharge        Nutrition/Hydration-ADULT     Nutrient/Hydration intake appropriate for improving, restoring or maintaining nutritional needs Adequate for Discharge        PAIN - ADULT     Verbalizes/displays adequate comfort level or baseline comfort level Adequate for Discharge        Potential for Falls     Patient will remain free of falls Adequate for Discharge        SAFETY ADULT     Maintain or return to baseline ADL function Adequate for Discharge     Maintain or return mobility status to optimal level Adequate for Discharge

## 2017-09-29 NOTE — DISCHARGE INSTRUCTIONS
Recommend patient get anal manometry studies and other bowel transit studies with [facility]  Abdominal Pain, Ambulatory Care   GENERAL INFORMATION:   Abdominal pain  can be dull, achy, or sharp  You may have pain in one area of your abdomen, or in your entire abdomen  Your pain may be caused by constipation, food sensitivity or poisoning, infection, or a blockage  Abdominal pain can also be caused by a hernia, appendicitis, or an ulcer  The cause of your abdominal pain may be unknown  Seek immediate care for the following symptoms:   · New chest pain or shortness of breath    · Pulsing pain in your upper abdomen or lower back that suddenly becomes constant    · Pain in the right lower abdominal area that worsens with movement    · Fever over 100 4°F (38°C) or shaking chills    · Vomiting and you cannot keep food or fluids down    · Pain does not improve or gets worse over the next 8 to 12 hours    · Blood in your vomit or bowel movements, or they look black and tarry    · Skin or the whites of your eyes turn yellow    · Large amount of vaginal bleeding that is not your monthly period  Treatment for abdominal pain  may include medicine to calm your stomach, prevent vomiting, or decrease pain  Follow up with your healthcare provider as directed:  Write down your questions so you remember to ask them during your visits  CARE AGREEMENT:   You have the right to help plan your care  Learn about your health condition and how it may be treated  Discuss treatment options with your caregivers to decide what care you want to receive  You always have the right to refuse treatment  The above information is an  only  It is not intended as medical advice for individual conditions or treatments  Talk to your doctor, nurse or pharmacist before following any medical regimen to see if it is safe and effective for you    © 2014 [address] [employer] is for End User's use only and may not be sold, redistributed or otherwise used for commercial purposes  All illustrations and images included in CareNotes® are the copyrighted property of A D A M , Inc  or Carlos Alberto Conner

## 2017-10-04 ENCOUNTER — ALLSCRIPTS OFFICE VISIT (OUTPATIENT)
Dept: FAMILY MEDICINE CLINIC | Facility: CLINIC | Age: 42
End: 2017-10-04
Payer: COMMERCIAL

## 2017-10-04 PROCEDURE — 99213 OFFICE O/P EST LOW 20 MIN: CPT | Performed by: FAMILY MEDICINE

## 2017-10-20 ENCOUNTER — DOCTOR'S OFFICE (OUTPATIENT)
Dept: URBAN - NONMETROPOLITAN AREA CLINIC 1 | Facility: CLINIC | Age: 42
Setting detail: OPHTHALMOLOGY
End: 2017-10-20
Payer: COMMERCIAL

## 2017-10-20 DIAGNOSIS — H43.812: ICD-10-CM

## 2017-10-20 DIAGNOSIS — H25.13: ICD-10-CM

## 2017-10-20 DIAGNOSIS — H46.8: ICD-10-CM

## 2017-10-20 DIAGNOSIS — H43.811: ICD-10-CM

## 2017-10-20 DIAGNOSIS — H53.123: ICD-10-CM

## 2017-10-20 DIAGNOSIS — H43.813: ICD-10-CM

## 2017-10-20 PROCEDURE — 99214 OFFICE O/P EST MOD 30 MIN: CPT | Performed by: OPHTHALMOLOGY

## 2017-10-20 PROCEDURE — 92225 OPHTHALMOSCOPY EXTENDED INITIAL: CPT | Performed by: OPHTHALMOLOGY

## 2017-10-20 PROCEDURE — 92133 CPTRZD OPH DX IMG PST SGM ON: CPT | Performed by: OPHTHALMOLOGY

## 2017-10-20 ASSESSMENT — VISUAL ACUITY
OD_BCVA: 20/20
OS_BCVA: 20/30

## 2017-10-20 ASSESSMENT — REFRACTION_MANIFEST
OS_VA1: 20/
OS_VA1: 20/
OS_VA3: 20/
OU_VA: 20/
OD_VA3: 20/
OD_VA2: 20/
OS_VA2: 20/
OD_VA3: 20/
OD_VA3: 20/
OD_VA2: 20/
OD_VA2: 20/
OD_VA1: 20/
OD_VA1: 20/
OU_VA: 20/
OS_VA2: 20/
OU_VA: 20/
OS_VA1: 20/
OS_VA3: 20/
OS_VA2: 20/
OS_VA3: 20/
OD_VA1: 20/

## 2017-10-20 ASSESSMENT — REFRACTION_CURRENTRX
OD_OVR_VA: 20/
OS_OVR_VA: 20/
OD_OVR_VA: 20/
OD_OVR_VA: 20/
OS_OVR_VA: 20/
OS_OVR_VA: 20/

## 2017-10-20 ASSESSMENT — CONFRONTATIONAL VISUAL FIELD TEST (CVF)
OD_FINDINGS: FULL
OS_FINDINGS: FULL

## 2017-10-23 ENCOUNTER — DOCTOR'S OFFICE (OUTPATIENT)
Dept: URBAN - NONMETROPOLITAN AREA CLINIC 1 | Facility: CLINIC | Age: 42
Setting detail: OPHTHALMOLOGY
End: 2017-10-23
Payer: COMMERCIAL

## 2017-10-23 DIAGNOSIS — H43.813: ICD-10-CM

## 2017-10-23 DIAGNOSIS — H46.8: ICD-10-CM

## 2017-10-23 DIAGNOSIS — H53.123: ICD-10-CM

## 2017-10-23 DIAGNOSIS — H40.003: ICD-10-CM

## 2017-10-23 DIAGNOSIS — H25.13: ICD-10-CM

## 2017-10-23 PROCEDURE — 92014 COMPRE OPH EXAM EST PT 1/>: CPT | Performed by: OPHTHALMOLOGY

## 2017-10-23 PROCEDURE — 92235 FLUORESCEIN ANGRPH MLTIFRAME: CPT | Performed by: OPHTHALMOLOGY

## 2017-10-23 PROCEDURE — 92250 FUNDUS PHOTOGRAPHY W/I&R: CPT | Performed by: OPHTHALMOLOGY

## 2017-10-23 ASSESSMENT — REFRACTION_MANIFEST
OU_VA: 20/
OD_VA2: 20/
OD_VA1: 20/
OD_VA2: 20/
OS_VA3: 20/
OD_VA3: 20/
OS_VA2: 20/
OS_VA3: 20/
OS_VA1: 20/
OD_VA1: 20/
OS_VA2: 20/
OU_VA: 20/
OD_VA2: 20/
OS_VA1: 20/
OD_VA1: 20/
OS_VA1: 20/
OD_VA3: 20/
OD_VA3: 20/
OU_VA: 20/
OS_VA3: 20/
OS_VA2: 20/

## 2017-10-23 ASSESSMENT — REFRACTION_CURRENTRX
OD_OVR_VA: 20/
OS_OVR_VA: 20/
OD_OVR_VA: 20/
OS_OVR_VA: 20/
OD_OVR_VA: 20/
OS_OVR_VA: 20/

## 2017-10-23 ASSESSMENT — VISUAL ACUITY
OD_BCVA: 20/20
OS_BCVA: 20/30

## 2017-10-23 ASSESSMENT — CONFRONTATIONAL VISUAL FIELD TEST (CVF)
OD_FINDINGS: FULL
OS_FINDINGS: FULL

## 2017-10-26 ENCOUNTER — GENERIC CONVERSION - ENCOUNTER (OUTPATIENT)
Dept: OTHER | Facility: OTHER | Age: 42
End: 2017-10-26

## 2017-10-27 ENCOUNTER — GENERIC CONVERSION - ENCOUNTER (OUTPATIENT)
Dept: OTHER | Facility: OTHER | Age: 42
End: 2017-10-27

## 2017-10-31 ENCOUNTER — GENERIC CONVERSION - ENCOUNTER (OUTPATIENT)
Dept: OTHER | Facility: OTHER | Age: 42
End: 2017-10-31

## 2017-11-09 ENCOUNTER — GENERIC CONVERSION - ENCOUNTER (OUTPATIENT)
Dept: OTHER | Facility: OTHER | Age: 42
End: 2017-11-09

## 2017-11-15 ENCOUNTER — HOSPITAL ENCOUNTER (OUTPATIENT)
Dept: NON INVASIVE DIAGNOSTICS | Facility: HOSPITAL | Age: 42
Discharge: HOME | End: 2017-11-15
Attending: INTERNAL MEDICINE
Payer: COMMERCIAL

## 2017-11-15 DIAGNOSIS — R07.89 OTHER CHEST PAIN: ICD-10-CM

## 2017-11-15 PROCEDURE — 93350 STRESS TTE ONLY: CPT

## 2017-11-21 ENCOUNTER — GENERIC CONVERSION - ENCOUNTER (OUTPATIENT)
Dept: OTHER | Facility: OTHER | Age: 42
End: 2017-11-21

## 2017-11-21 DIAGNOSIS — I21.4 NON-ST ELEVATION (NSTEMI) MYOCARDIAL INFARCTION (HCC): ICD-10-CM

## 2017-11-21 DIAGNOSIS — H53.9 VISUAL DISTURBANCE: ICD-10-CM

## 2017-11-21 DIAGNOSIS — R10.9 ABDOMINAL PAIN: ICD-10-CM

## 2017-11-21 DIAGNOSIS — R53.83 OTHER FATIGUE: ICD-10-CM

## 2017-11-21 DIAGNOSIS — R07.89 OTHER CHEST PAIN: ICD-10-CM

## 2017-11-22 ENCOUNTER — GENERIC CONVERSION - ENCOUNTER (OUTPATIENT)
Dept: OTHER | Facility: OTHER | Age: 42
End: 2017-11-22

## 2017-11-29 ENCOUNTER — ALLSCRIPTS OFFICE VISIT (OUTPATIENT)
Dept: OTHER | Facility: OTHER | Age: 42
End: 2017-11-29

## 2017-11-30 NOTE — PROGRESS NOTES
Assessment  Assessed    1  Atypical chest pain (786 59) (R07 89)   2  Non-ST elevation myocardial infarction (NSTEMI), type 2 (410 70) (I21 4)   3  Fatigue (780 79) (R53 83)   4  Visual disturbance (368 9) (H53 9)    Plan  Atypical chest pain, Fatigue, Non-ST elevation myocardial infarction (NSTEMI), type 2,Visual disturbance    · (1) LIPID PANEL FASTING W DIRECT LDL REFLEX; Status:Active; Requestedfor:29Nov2017;    Perform:[facility]; MKP:[id number]; Ordered;chest pain, Fatigue, Non-ST elevation myocardial infarction (NSTEMI), type 2, Visual disturbance; Ordered By:Thanh García;   · HOLTER MONITOR - 24 HOUR; Status:Hold For - Scheduling; Requestedfor:29Nov2017;    Perform:[facility]; Due:29Nov2018; Ordered;chest pain, Fatigue, Non-ST elevation myocardial infarction (NSTEMI), type 2, Visual disturbance; Ordered By:Thanh García;   · Follow-up visit in 6 months Evaluation and Treatment  Follow-up  Status: Hold For -Scheduling  Requested for: 37FHK4010   Ordered; Atypical chest pain, Fatigue, Non-ST elevation myocardial infarction (NSTEMI), type 2, Visual disturbance; Ordered By: Terence Nelson Performed:  Due: 35QFS0512  Visual disturbance    · VAS CAROTID COMPLETE STUDY; SIDE:Bilateral; Status:Hold For - Scheduling;Requested for:29Nov2017;    Perform:[facility]; Due:29Nov2018; Ordered;disturbance; Ordered By:Luiz García Poster;    Discussion/Summary  Cardiology Discussion Summary Free Text Note Form [facility]:   From a cardiac standpoint she's been doing well  Recent stress test showed no ischemia 3 she has upcoming surgery I would be cautious with IV fluids  Echo was reviewed which showed normal systolic and diastolic parameters  Blood pressures well-controlled  She's due for fasting lipid profile and she has had mild palpitations I've ordered a 24-hour Holter monitor  The eye doctor seems to think the visual disturbances vascular we'll check carotid duplex  History of Present Illness  Cardiology [facility] Free Text Note Form [facility]: Mrs Danny Perez Severe pleasant 75-year-old female with a history of multiple GI complaints twice on the hospital for an episode of volume overload  She had gotten a significant amount of IV fluids for resuscitation secondary to dehydration from diarrhea and gastrointestinal issues  She had pulmonary edema and lower extremity edema  She was given Lasix and diuresis  Echocardiogram at that time showed normal left ventricular systolic function as well as normal diastolic parameters  I felt this was iatrogenic volume overload in the setting of hypo-proteinemia  Following hospital discharge from a cardiac standpoint she's been doing well  She gets occasional palpitations which are mild in nature  She's had some issues with visual disturbance which was first felt to be autoimmune and then felt to be vascular  Not much workup has been done  She is episodes of vision loss which appear to be positional when changing orientation of her head  She denies any exertional chest pain or discomfort  Recent stress echo showed no ischemia  There's been no significant lower extremity edema other than 2 days before Thanksgiving which was quite mild  Review of Systems  Cardiology Female ROS:    Cardiac: No complaints of chest pain, no palpitations, no fainting  Skin: No complaints of nonhealing sores or skin rash  Genitourinary: No complaints of recurrent urinary tract infections, frequent urination at night, difficult urination, blood in urine, kidney stones, loss of bladder control, kidney problems, denies any birth control or hormone replacement, is not post menopausal, not currently pregnant  Psychological: No complaints of feeling depressed, anxiety, panic attacks, or difficulty concentrating  General: No complaints of trouble sleeping, lack of energy, fatigue, appetite changes, weight changes, fever, frequent infections, or night sweats    Respiratory: No complaints of shortness of breath, cough with sputum, or wheezing  HEENT: No complaints of serious problems, hearing problems, nose problems, throat problems, or snoring  Gastrointestinal: No complaints of liver problems, nausea, vomiting, heartburn, constipation, bloody stools, diarrhea, problems swallowing, adbominal pain, or rectal bleeding  Hematologic: No complaints of bleeding disorders, anemia, blood clots, or excessive brusing  Neurological: No complaints of numbness, tingling, dizziness, weakness, seizures, headaches, syncope or fainting, AM fatigue, daytime sleepiness, no witnessed apnea episodes  Musculoskeletal: No complaints of arthritis, back pain, or painfull swelling  Active Problems  Problems    1  Abdominal pain (789 00) (R10 9)   2  Activity involving volleyball (E007 7) (Y93 68)   3  Acute respiratory failure with hypoxia (518 81) (J96 01)   4  Anxiety (300 00) (F41 9)   5  Atypical chest pain (786 59) (R07 89)   6  Colitis (558 9) (K52 9)   7  Crohn's colitis (555 1) (K50 10)   8  Encounter for routine gynecological examination (V72 31) (Z01 419)   9  Encounter for screening mammogram for malignant neoplasm of breast (V76 12) (Z12 31)   10  Fatigue (780 79) (R53 83)   11  GI bleed (578 9) (K92 2)   12  History of biliary stent insertion (V45 89) (Z98 890)   13  Hypothyroidism (244 9) (E03 9)   14  Idiopathic urticaria (708 1) (L50 1)   15  Insomnia (780 52) (G47 00)   16  Mast cell disorder (757 33) (D47 09)   17  Right upper quadrant abdominal pain (789 01) (R10 11)   18  Screening for depression (V79 0) (Z13 89)   19  Septic shock (038 9,785 52,995 92) (A41 9,R65 21)   20  Severe sepsis (038 9,995 92) (A41 9,R65 20)   21  Swelling of lower extremity (729 81) (M79 89)    Past Medical History  Problems    1  History of Acute pancreatitis, unspecified pancreatitis type   2  History of pregnancy (V13 29)   3   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)  Active Problems And Past Medical History Reviewed: The active problems and past medical history were reviewed and updated today  Surgical History  Problems    1  History of Ankle Surgery   2  History of Appendectomy   3  History of Cholecystectomy   4  History of Laparoscopy (Diagnostic)   5  History of Laparoscopy With Total Hysterectomy   6  History of Liver Surgery  Surgical History Reviewed: The surgical history was reviewed and updated today  Family History  Mother    1  Family history of arthritis (V17 7) (Z82 61)   2  Family history of cardiac disorder (V17 49) (Z82 49)   3  Family history of kidney stones (V18 69) (Z84 1)   4  Family history of rheumatoid arthritis (V17 7) (Z82 61)  Father    5  Family history of cardiac disorder (V17 49) (Z82 49)   6  Family history of diabetes mellitus (V18 0) (Z83 3)   7  Family history of hyperlipidemia (V18 19) (Z83 49)   8  Family history of hypertension (V17 49) (Z82 49)  Daughter    5  Family history of asthma (V17 5) (Z82 5)  Son    8  Family history of autism (V17 0) (Z81 8)  Family History Reviewed: The family history was reviewed and updated today  Social History  Problems    · Activity involving volleyball (E007 7) (U11 84)   ·    · Never a smoker   · Social alcohol use (Z78 9)  Social History Reviewed: The social history was reviewed and updated today  Current Meds   1  Benadryl TABS; Therapy: (Recorded:04Oct2017) to Recorded   2  Cholecalciferol 400 UNIT/ML LIQD; Therapy: (Recorded:04Oct2017) to Recorded   3  Florastor 250 MG Oral Capsule; TAKE 1 CAPSULE TWICE DAILY; Therapy: (Recorded:04Oct2017) to Recorded   4  Furosemide 20 MG Oral Tablet; Take 1 tablet daily; Therapy: 54NDH0100 to (Evaluate:29Lzm7221)  Requested for: 34RAH5994; Last Rx:22Nov2017 Ordered   5  Levothyroxine Sodium 75 MCG Oral Tablet; TAKE 1 TABLET DAILY AS DIRECTED; Therapy: (Recorded:14Qih0312) to Recorded   6  Liothyronine Sodium 5 MCG Oral Tablet; 1 tab bid;  Therapy: (Recorded:29Nov2017) to Recorded   7  LORazepam 1 MG Oral Tablet; TAKE ONE(1) TABLET BY MOUTH EVERY 6 TO 8 HOURS AS NEEDED FOR ANXIETY AND SLEEP; Therapy: 80KTP6922 to (Evaluate:02Jun2017)  Requested for: 75UQE0287; Last Rx:03May2017 Ordered   8  Potassium Chloride ER 10 MEQ Oral Capsule Extended Release; take 1 capsule daily; Therapy: 41ZLA7608 to (Last Rx:22Nov2017)  Requested for: 22Nov2017 Ordered   9  PredniSONE TABS; take as directed; Therapy: (Recorded:04Oct2017) to Recorded   10  Singulair 10 MG Oral Tablet; TAKE 1 TABLET AT BEDTIME; Therapy: (Recorded:19May2016) to Recorded   11  TraMADol HCl - 50 MG Oral Tablet; 1 po q 6 hrs prn; Therapy: 62KAC2823 to (Last Rx:47Yda7215) Ordered   12  Xolair 150 MG Subcutaneous Solution Reconstituted; INJECT 150 MG  SUBCUTANEOUSLY EVERY 4 WEEKS; Therapy: (Recorded:19May2016) to Recorded   13  Xopenex HFA 45 MCG/ACT Inhalation Aerosol; INHALE 2 PUFFS EVERY 4 HOURS AS  NEEDED; Therapy: (Recorded:29Nov2017) to Recorded    Allergies  Medication    1  Aspergillus Fumigatis SOLN   2  Ibuprofen TABS   3  Penicillins   4  Sulfa Drugs   5  Sulfites   6  Elavil   7  Imitrex TABS   8  Imuran SOLR    Vitals  Vital Signs    Recorded: 16BYS6150 12:52PM   Heart Rate 90   Systolic 085   Diastolic 69   Height 5 ft 5 in   Weight 139 lb    BMI Calculated 23 13   BSA Calculated 1 69       Physical Exam   Constitutional  General appearance: No acute distress, well appearing and well nourished  Eyes  Conjunctiva and Sclera examination: Conjunctiva pink, sclera anicteric  Ears, Nose, Mouth, and Throat - Oropharynx: Clear, nares are clear, mucous membranes are moist   Neck  Neck and thyroid: Normal, supple, trachea midline, no thyromegaly  Pulmonary  Respiratory effort: No increased work of breathing or signs of respiratory distress  Auscultation of lungs: Clear to auscultation, no rales, no rhonchi, no wheezing, good air movement     Cardiovascular  Auscultation of heart: Normal rate and rhythm, normal S1 and S2, no murmurs  Carotid pulses: Normal, 2+ bilaterally  Peripheral vascular exam: Normal pulses throughout, no tenderness, erythema or swelling  Pedal pulses: Normal, 2+ bilaterally  Examination of extremities for edema and/or varicosities: Normal    Abdomen  Abdomen: Non-tender and no distention  Liver and spleen: No hepatomegaly or splenomegaly  Musculoskeletal Gait and station: Normal gait  -- Digits and nails: Normal without clubbing or cyanosis  -- Inspection/palpation of joints, bones, and muscles: Normal, ROM normal    Skin - Skin and subcutaneous tissue: Normal without rashes or lesions  Skin is warm and well perfused, normal turgor  Neurologic - Cranial nerves: II - XII intact  -- Speech: Normal    Psychiatric - Orientation to person, place, and time: Normal -- Mood and affect: Normal       Future Appointments    Date/Time Provider Specialty Site   12/28/2017 01:45 PM Sherrie Longoria[address]       Signatures   Electronically signed by : Samantha Burrell DO; Nov 29 2017  1:24PM EST                       (Author)

## 2017-12-05 ENCOUNTER — GENERIC CONVERSION - ENCOUNTER (OUTPATIENT)
Dept: OTHER | Facility: OTHER | Age: 42
End: 2017-12-05

## 2017-12-05 ENCOUNTER — HOSPITAL ENCOUNTER (OUTPATIENT)
Dept: NON INVASIVE DIAGNOSTICS | Facility: HOSPITAL | Age: 42
Discharge: HOME | End: 2017-12-05
Attending: INTERNAL MEDICINE
Payer: COMMERCIAL

## 2017-12-05 ENCOUNTER — HOSPITAL ENCOUNTER (OUTPATIENT)
Dept: ULTRASOUND IMAGING | Facility: HOSPITAL | Age: 42
Discharge: HOME | End: 2017-12-05
Attending: INTERNAL MEDICINE
Payer: COMMERCIAL

## 2017-12-05 DIAGNOSIS — I21.4 NON-ST ELEVATION (NSTEMI) MYOCARDIAL INFARCTION (HCC): ICD-10-CM

## 2017-12-05 DIAGNOSIS — R53.83 OTHER FATIGUE: ICD-10-CM

## 2017-12-05 DIAGNOSIS — R07.89 OTHER CHEST PAIN: ICD-10-CM

## 2017-12-05 DIAGNOSIS — H53.9 VISUAL DISTURBANCE: ICD-10-CM

## 2017-12-05 PROCEDURE — 93226 XTRNL ECG REC<48 HR SCAN A/R: CPT

## 2017-12-05 PROCEDURE — 93225 XTRNL ECG REC<48 HRS REC: CPT

## 2017-12-05 PROCEDURE — 93880 EXTRACRANIAL BILAT STUDY: CPT

## 2017-12-06 ENCOUNTER — TRANSCRIBE ORDERS (OUTPATIENT)
Dept: ADMINISTRATIVE | Facility: HOSPITAL | Age: 42
End: 2017-12-06

## 2017-12-06 ENCOUNTER — ANESTHESIA EVENT (OUTPATIENT)
Dept: PERIOP | Facility: HOSPITAL | Age: 42
End: 2017-12-06
Payer: COMMERCIAL

## 2017-12-06 ENCOUNTER — APPOINTMENT (OUTPATIENT)
Dept: LAB | Facility: HOSPITAL | Age: 42
End: 2017-12-06
Attending: SURGERY
Payer: COMMERCIAL

## 2017-12-06 ENCOUNTER — LAB REQUISITION (OUTPATIENT)
Dept: LAB | Facility: HOSPITAL | Age: 42
End: 2017-12-06
Payer: COMMERCIAL

## 2017-12-06 ENCOUNTER — APPOINTMENT (OUTPATIENT)
Dept: LAB | Facility: HOSPITAL | Age: 42
End: 2017-12-06
Attending: INTERNAL MEDICINE
Payer: COMMERCIAL

## 2017-12-06 DIAGNOSIS — R53.83 OTHER FATIGUE: ICD-10-CM

## 2017-12-06 DIAGNOSIS — R10.9 ABDOMINAL PAIN: ICD-10-CM

## 2017-12-06 DIAGNOSIS — E03.9 ACQUIRED HYPOTHYROIDISM: ICD-10-CM

## 2017-12-06 DIAGNOSIS — Z01.818 PREOP EXAMINATION: Primary | ICD-10-CM

## 2017-12-06 DIAGNOSIS — H53.9 VISUAL DISTURBANCE: ICD-10-CM

## 2017-12-06 DIAGNOSIS — R07.89 OTHER CHEST PAIN: ICD-10-CM

## 2017-12-06 DIAGNOSIS — I21.4 NON-ST ELEVATION (NSTEMI) MYOCARDIAL INFARCTION (HCC): ICD-10-CM

## 2017-12-06 DIAGNOSIS — E03.9 ACQUIRED HYPOTHYROIDISM: Primary | ICD-10-CM

## 2017-12-06 DIAGNOSIS — Z01.818 PREOP EXAMINATION: ICD-10-CM

## 2017-12-06 LAB
ALBUMIN SERPL BCP-MCNC: 3.7 G/DL (ref 3.5–5)
ALP SERPL-CCNC: 49 U/L (ref 46–116)
ALT SERPL W P-5'-P-CCNC: 28 U/L (ref 12–78)
ANION GAP SERPL CALCULATED.3IONS-SCNC: 8 MMOL/L (ref 4–13)
AST SERPL W P-5'-P-CCNC: 15 U/L (ref 5–45)
BASOPHILS # BLD AUTO: 0.08 THOUSANDS/ΜL (ref 0–0.1)
BASOPHILS NFR BLD AUTO: 2 % (ref 0–1)
BILIRUB SERPL-MCNC: 0.6 MG/DL (ref 0.2–1)
BUN SERPL-MCNC: 8 MG/DL (ref 5–25)
CALCIUM SERPL-MCNC: 8.4 MG/DL (ref 8.3–10.1)
CHLORIDE SERPL-SCNC: 103 MMOL/L (ref 100–108)
CHOLEST SERPL-MCNC: 149 MG/DL (ref 50–200)
CO2 SERPL-SCNC: 27 MMOL/L (ref 21–32)
CREAT SERPL-MCNC: 0.61 MG/DL (ref 0.6–1.3)
EOSINOPHIL # BLD AUTO: 0.12 THOUSAND/ΜL (ref 0–0.61)
EOSINOPHIL NFR BLD AUTO: 3 % (ref 0–6)
ERYTHROCYTE [DISTWIDTH] IN BLOOD BY AUTOMATED COUNT: 12.2 % (ref 11.6–15.1)
GFR SERPL CREATININE-BSD FRML MDRD: 111 ML/MIN/1.73SQ M
GLUCOSE P FAST SERPL-MCNC: 86 MG/DL (ref 65–99)
HCT VFR BLD AUTO: 43.5 % (ref 34.8–46.1)
HDLC SERPL-MCNC: 61 MG/DL (ref 40–60)
HGB BLD-MCNC: 14.8 G/DL (ref 11.5–15.4)
LDLC SERPL CALC-MCNC: 79 MG/DL (ref 0–100)
LYMPHOCYTES # BLD AUTO: 1.53 THOUSANDS/ΜL (ref 0.6–4.47)
LYMPHOCYTES NFR BLD AUTO: 35 % (ref 14–44)
MCH RBC QN AUTO: 31.8 PG (ref 26.8–34.3)
MCHC RBC AUTO-ENTMCNC: 34 G/DL (ref 31.4–37.4)
MCV RBC AUTO: 94 FL (ref 82–98)
MONOCYTES # BLD AUTO: 0.48 THOUSAND/ΜL (ref 0.17–1.22)
MONOCYTES NFR BLD AUTO: 11 % (ref 4–12)
NEUTROPHILS # BLD AUTO: 2.22 THOUSANDS/ΜL (ref 1.85–7.62)
NEUTS SEG NFR BLD AUTO: 49 % (ref 43–75)
PLATELET # BLD AUTO: 334 THOUSANDS/UL (ref 149–390)
PMV BLD AUTO: 9.3 FL (ref 8.9–12.7)
POTASSIUM SERPL-SCNC: 4.8 MMOL/L (ref 3.5–5.3)
PROT SERPL-MCNC: 6.8 G/DL (ref 6.4–8.2)
RBC # BLD AUTO: 4.65 MILLION/UL (ref 3.81–5.12)
SODIUM SERPL-SCNC: 138 MMOL/L (ref 136–145)
T3FREE SERPL-MCNC: 3.68 PG/ML (ref 2.3–4.2)
T4 FREE SERPL-MCNC: 1.19 NG/DL (ref 0.76–1.46)
TRIGL SERPL-MCNC: 45 MG/DL
TSH SERPL DL<=0.05 MIU/L-ACNC: 0.02 UIU/ML (ref 0.36–3.74)
WBC # BLD AUTO: 4.43 THOUSAND/UL (ref 4.31–10.16)

## 2017-12-06 PROCEDURE — 80053 COMPREHEN METABOLIC PANEL: CPT

## 2017-12-06 PROCEDURE — 86850 RBC ANTIBODY SCREEN: CPT | Performed by: PHYSICIAN ASSISTANT

## 2017-12-06 PROCEDURE — 84443 ASSAY THYROID STIM HORMONE: CPT

## 2017-12-06 PROCEDURE — 85025 COMPLETE CBC W/AUTO DIFF WBC: CPT

## 2017-12-06 PROCEDURE — 86900 BLOOD TYPING SEROLOGIC ABO: CPT | Performed by: PHYSICIAN ASSISTANT

## 2017-12-06 PROCEDURE — 80061 LIPID PANEL: CPT

## 2017-12-06 PROCEDURE — 86901 BLOOD TYPING SEROLOGIC RH(D): CPT | Performed by: PHYSICIAN ASSISTANT

## 2017-12-06 PROCEDURE — 84481 FREE ASSAY (FT-3): CPT

## 2017-12-06 PROCEDURE — 84439 ASSAY OF FREE THYROXINE: CPT

## 2017-12-06 PROCEDURE — 36415 COLL VENOUS BLD VENIPUNCTURE: CPT

## 2017-12-06 RX ORDER — MONTELUKAST SODIUM 10 MG/1
10 TABLET ORAL 2 TIMES DAILY
COMMUNITY
End: 2019-07-03 | Stop reason: SDUPTHER

## 2017-12-06 RX ORDER — TRAMADOL HYDROCHLORIDE 50 MG/1
50 TABLET ORAL AS NEEDED
COMMUNITY
End: 2018-02-12 | Stop reason: ALTCHOICE

## 2017-12-06 RX ORDER — POTASSIUM CHLORIDE 750 MG/1
10 CAPSULE, EXTENDED RELEASE ORAL 2 TIMES DAILY
COMMUNITY
End: 2018-02-12 | Stop reason: ALTCHOICE

## 2017-12-06 RX ORDER — FUROSEMIDE 20 MG/1
20 TABLET ORAL AS NEEDED
COMMUNITY
End: 2018-02-12 | Stop reason: ALTCHOICE

## 2017-12-06 RX ORDER — PREDNISONE 1 MG/1
5 TABLET ORAL DAILY
COMMUNITY
End: 2018-05-21 | Stop reason: SDUPTHER

## 2017-12-06 NOTE — PRE-PROCEDURE INSTRUCTIONS
Pre-Surgery Instructions:   Medication Instructions    cholecalciferol (VITAMIN D) 400 units/mL Instructed patient per Anesthesia Guidelines   diphenhydrAMINE (BENADRYL) 25 mg capsule Patient was instructed by Physician and understands   diphenhydrAMINE (BENADRYL) 50 mg capsule Patient was instructed by Physician and understands   furosemide (LASIX) 20 mg tablet Patient was instructed per "E-Preop"    Levalbuterol HCl (XOPENEX IN) Instructed patient per Anesthesia Guidelines   levothyroxine 75 mcg tablet Patient was instructed per "E-Preop"    liothyronine (CYTOMEL) 5 mcg tablet Patient was instructed per "E-Preop"    LORazepam (ATIVAN) 1 mg tablet Patient was instructed per "E-Preop"    montelukast (SINGULAIR) 10 mg tablet Patient was instructed per "E-Preop"    omalizumab (XOLAIR) 150 mg Instructed patient per Anesthesia Guidelines   predniSONE 5 mg tablet Patient was instructed per "E-Preop"    saccharomyces boulardii (FLORASTOR) 250 mg capsule Instructed patient per Anesthesia Guidelines   traMADol (ULTRAM) 50 mg tablet Patient was instructed per "E-Preop"      Spoke to pt  Medication list reviewed & confirmed  As of 12 6 17 pt instructed on tylenol only  On am DOS pt to take levothyroxine, liothyronine  Tramadol or xanax ok if needed  Pt to bring xopenex  Showering instructions provided by office  Reviewed at time of call  Pt has soap  All instructions verbally understood by patient  All questions answered  Pt has callback number  Medication Instruction (Benzodiazepine)    Please continue the following medications, if needed, up to and including the day of surgery (with a sip of water)  LORazepam           Medication Instruction (Diuretics - Water Pills)    Please continue the following medications up to the evening before surgery, but do not take it on the day of surgery          furosemide (Lasix)          Medication Instruction (Leukotriene Inhibitors - Asthma Medication)    Please continue to take this medication on your normal schedule  If this is an oral medication and you take in the morning, you may do so with a sip of water  montelukast (Singulair)          NPO Instructions    Please do not eat or drink anything prior to your surgery as follows: For AM Surgery times = stop at midnight the night before  For PM Surgery times = Midnight to 8AM clear liquids only (Jello, broth, 7up, Sprite, apple juice, black coffee, black tea, Gatorade)  If you are supposed to take any of your medications, do so with a sip of water  Failure to follow these instructions can lead to an increased risk of lung complications and may result in a delay or cancellation of your procedure  If you have any questions, contact your institution for further instructions  Medical Procedure Risk        Medication Instruction (Opioids - Pain Medication)    Please continue the following medications, if needed, up to and including the day of surgery (with a sip of water)  traMADol          Medication Instruction (Steroids)    Please continue your steroid medications up to and including the day of surgery (with a sip of water, if oral medication)  predniSONE          Medication Instruction (Thyroxine - Synthroid)    Please continue to take this medication on your normal schedule  If this is an oral medication and you take in the morning, you may do so with a sip of water           levothyroxine, liothyronine

## 2017-12-08 ENCOUNTER — GENERIC CONVERSION - ENCOUNTER (OUTPATIENT)
Dept: FAMILY MEDICINE CLINIC | Facility: CLINIC | Age: 42
End: 2017-12-08

## 2017-12-08 ENCOUNTER — GENERIC CONVERSION - ENCOUNTER (OUTPATIENT)
Dept: OTHER | Facility: OTHER | Age: 42
End: 2017-12-08

## 2017-12-09 NOTE — PROGRESS NOTES
Surgery Scheduling Form  Pre-Operative Risk Assessment:  Date Last Seen: 11/29/2017  Date of Surgery: 12/12/2017  Hospital: [place] [address]   Type of Surgery: Diagnostic Lap, Poss Exploratory Lap, Poss Cystic Duct Revision  Surgeon Name: Dr Marlen Roth Office Number: [phone]  Fax Number: [phone]  Cardiac: No Cardiac Contraindication for planned surgical procedure  Physician Comments: low cardiac risk for surgery    Further Testing Required: No  Anesthesia: General  Patient Approved for Surgery: Yes  Clearing Doctor: Klarissa Hart   Electronically signed by : Beatriz Green RN; Dec  8 2017  4:22PM EST                       (Co-author)    Electronically signed by : Zaida Barajas DO; Dec  8 2017  4:53PM EST                       (Author)

## 2017-12-11 LAB
ABO GROUP BLD: NORMAL
BLD GP AB SCN SERPL QL: NEGATIVE
RH BLD: POSITIVE
SPECIMEN EXPIRATION DATE: NORMAL

## 2017-12-12 ENCOUNTER — HOSPITAL ENCOUNTER (OUTPATIENT)
Facility: HOSPITAL | Age: 42
Setting detail: OUTPATIENT SURGERY
Discharge: HOME | End: 2017-12-12
Attending: SURGERY | Admitting: SURGERY
Payer: COMMERCIAL

## 2017-12-12 ENCOUNTER — ANESTHESIA (OUTPATIENT)
Dept: PERIOP | Facility: HOSPITAL | Age: 42
End: 2017-12-12
Payer: COMMERCIAL

## 2017-12-12 VITALS
OXYGEN SATURATION: 99 % | SYSTOLIC BLOOD PRESSURE: 91 MMHG | RESPIRATION RATE: 16 BRPM | DIASTOLIC BLOOD PRESSURE: 60 MMHG | WEIGHT: 135 LBS | BODY MASS INDEX: 26.5 KG/M2 | HEIGHT: 60 IN | TEMPERATURE: 100.3 F | HEART RATE: 86 BPM

## 2017-12-12 RX ORDER — LIDOCAINE HYDROCHLORIDE 10 MG/ML
INJECTION, SOLUTION INFILTRATION; PERINEURAL AS NEEDED
Status: DISCONTINUED | OUTPATIENT
Start: 2017-12-12 | End: 2017-12-12 | Stop reason: SURG

## 2017-12-12 RX ORDER — SODIUM CHLORIDE, SODIUM LACTATE, POTASSIUM CHLORIDE, CALCIUM CHLORIDE 600; 310; 30; 20 MG/100ML; MG/100ML; MG/100ML; MG/100ML
100 INJECTION, SOLUTION INTRAVENOUS CONTINUOUS
Status: DISCONTINUED | OUTPATIENT
Start: 2017-12-12 | End: 2017-12-12 | Stop reason: HOSPADM

## 2017-12-12 RX ORDER — DIPHENHYDRAMINE HYDROCHLORIDE 50 MG/ML
INJECTION INTRAMUSCULAR; INTRAVENOUS AS NEEDED
Status: DISCONTINUED | OUTPATIENT
Start: 2017-12-12 | End: 2017-12-12 | Stop reason: SURG

## 2017-12-12 RX ORDER — PROPOFOL 10 MG/ML
INJECTION, EMULSION INTRAVENOUS AS NEEDED
Status: DISCONTINUED | OUTPATIENT
Start: 2017-12-12 | End: 2017-12-12 | Stop reason: SURG

## 2017-12-12 RX ORDER — OXYCODONE HYDROCHLORIDE 5 MG/1
5 TABLET ORAL EVERY 4 HOURS PRN
Status: DISCONTINUED | OUTPATIENT
Start: 2017-12-12 | End: 2017-12-12 | Stop reason: HOSPADM

## 2017-12-12 RX ORDER — ACETAMINOPHEN 325 MG/1
650 TABLET ORAL EVERY 6 HOURS PRN
Status: DISCONTINUED | OUTPATIENT
Start: 2017-12-12 | End: 2017-12-12 | Stop reason: HOSPADM

## 2017-12-12 RX ORDER — ONDANSETRON 2 MG/ML
4 INJECTION INTRAMUSCULAR; INTRAVENOUS ONCE AS NEEDED
Status: DISCONTINUED | OUTPATIENT
Start: 2017-12-12 | End: 2017-12-12 | Stop reason: HOSPADM

## 2017-12-12 RX ORDER — SODIUM CHLORIDE, SODIUM LACTATE, POTASSIUM CHLORIDE, CALCIUM CHLORIDE 600; 310; 30; 20 MG/100ML; MG/100ML; MG/100ML; MG/100ML
INJECTION, SOLUTION INTRAVENOUS CONTINUOUS PRN
Status: DISCONTINUED | OUTPATIENT
Start: 2017-12-12 | End: 2017-12-12 | Stop reason: SURG

## 2017-12-12 RX ORDER — MAGNESIUM HYDROXIDE 1200 MG/15ML
LIQUID ORAL AS NEEDED
Status: DISCONTINUED | OUTPATIENT
Start: 2017-12-12 | End: 2017-12-12 | Stop reason: HOSPADM

## 2017-12-12 RX ORDER — PREDNISONE 50 MG/1
50 TABLET ORAL DAILY
Status: DISCONTINUED | OUTPATIENT
Start: 2017-12-12 | End: 2017-12-12 | Stop reason: HOSPADM

## 2017-12-12 RX ORDER — ONDANSETRON 2 MG/ML
INJECTION INTRAMUSCULAR; INTRAVENOUS AS NEEDED
Status: DISCONTINUED | OUTPATIENT
Start: 2017-12-12 | End: 2017-12-12 | Stop reason: SURG

## 2017-12-12 RX ORDER — MIDAZOLAM HYDROCHLORIDE 1 MG/ML
INJECTION INTRAMUSCULAR; INTRAVENOUS AS NEEDED
Status: DISCONTINUED | OUTPATIENT
Start: 2017-12-12 | End: 2017-12-12 | Stop reason: SURG

## 2017-12-12 RX ORDER — OXYCODONE HYDROCHLORIDE 5 MG/1
5 TABLET ORAL EVERY 4 HOURS PRN
Qty: 20 TABLET | Refills: 0
Start: 2017-12-12 | End: 2018-02-12 | Stop reason: ALTCHOICE

## 2017-12-12 RX ORDER — CLINDAMYCIN PHOSPHATE 900 MG/50ML
900 INJECTION INTRAVENOUS ONCE
Status: COMPLETED | OUTPATIENT
Start: 2017-12-12 | End: 2017-12-12

## 2017-12-12 RX ORDER — BUPIVACAINE HYDROCHLORIDE 5 MG/ML
INJECTION, SOLUTION PERINEURAL AS NEEDED
Status: DISCONTINUED | OUTPATIENT
Start: 2017-12-12 | End: 2017-12-12 | Stop reason: HOSPADM

## 2017-12-12 RX ORDER — ONDANSETRON 2 MG/ML
4 INJECTION INTRAMUSCULAR; INTRAVENOUS EVERY 6 HOURS PRN
Status: DISCONTINUED | OUTPATIENT
Start: 2017-12-12 | End: 2017-12-12 | Stop reason: HOSPADM

## 2017-12-12 RX ORDER — GLYCOPYRROLATE 0.2 MG/ML
INJECTION INTRAMUSCULAR; INTRAVENOUS AS NEEDED
Status: DISCONTINUED | OUTPATIENT
Start: 2017-12-12 | End: 2017-12-12 | Stop reason: SURG

## 2017-12-12 RX ORDER — FENTANYL CITRATE/PF 50 MCG/ML
25 SYRINGE (ML) INJECTION
Status: DISCONTINUED | OUTPATIENT
Start: 2017-12-12 | End: 2017-12-12 | Stop reason: HOSPADM

## 2017-12-12 RX ORDER — FENTANYL CITRATE 50 UG/ML
INJECTION, SOLUTION INTRAMUSCULAR; INTRAVENOUS AS NEEDED
Status: DISCONTINUED | OUTPATIENT
Start: 2017-12-12 | End: 2017-12-12 | Stop reason: SURG

## 2017-12-12 RX ORDER — ROCURONIUM BROMIDE 10 MG/ML
INJECTION, SOLUTION INTRAVENOUS AS NEEDED
Status: DISCONTINUED | OUTPATIENT
Start: 2017-12-12 | End: 2017-12-12 | Stop reason: SURG

## 2017-12-12 RX ORDER — HEPARIN SODIUM 5000 [USP'U]/ML
5000 INJECTION, SOLUTION INTRAVENOUS; SUBCUTANEOUS ONCE
Status: COMPLETED | OUTPATIENT
Start: 2017-12-12 | End: 2017-12-12

## 2017-12-12 RX ORDER — MEPERIDINE HYDROCHLORIDE 25 MG/ML
25 INJECTION INTRAMUSCULAR; INTRAVENOUS; SUBCUTANEOUS AS NEEDED
Status: DISCONTINUED | OUTPATIENT
Start: 2017-12-12 | End: 2017-12-12 | Stop reason: HOSPADM

## 2017-12-12 RX ORDER — OXYCODONE HYDROCHLORIDE 10 MG/1
10 TABLET ORAL EVERY 4 HOURS PRN
Status: DISCONTINUED | OUTPATIENT
Start: 2017-12-12 | End: 2017-12-12 | Stop reason: HOSPADM

## 2017-12-12 RX ADMIN — LIDOCAINE HYDROCHLORIDE 50 MG: 10 INJECTION, SOLUTION INFILTRATION; PERINEURAL at 08:02

## 2017-12-12 RX ADMIN — PROPOFOL 200 MG: 10 INJECTION, EMULSION INTRAVENOUS at 08:02

## 2017-12-12 RX ADMIN — HYDROMORPHONE HYDROCHLORIDE 0.2 MG: 1 INJECTION, SOLUTION INTRAMUSCULAR; INTRAVENOUS; SUBCUTANEOUS at 08:38

## 2017-12-12 RX ADMIN — ROCURONIUM BROMIDE 50 MG: 10 INJECTION INTRAVENOUS at 08:02

## 2017-12-12 RX ADMIN — SODIUM CHLORIDE, SODIUM LACTATE, POTASSIUM CHLORIDE, AND CALCIUM CHLORIDE: .6; .31; .03; .02 INJECTION, SOLUTION INTRAVENOUS at 07:30

## 2017-12-12 RX ADMIN — DIPHENHYDRAMINE HYDROCHLORIDE 50 MG: 50 INJECTION, SOLUTION INTRAMUSCULAR; INTRAVENOUS at 07:54

## 2017-12-12 RX ADMIN — FENTANYL CITRATE 25 MCG: 50 INJECTION INTRAMUSCULAR; INTRAVENOUS at 10:17

## 2017-12-12 RX ADMIN — MIDAZOLAM HYDROCHLORIDE 2 MG: 1 INJECTION, SOLUTION INTRAMUSCULAR; INTRAVENOUS at 07:55

## 2017-12-12 RX ADMIN — CLINDAMYCIN PHOSPHATE 900 MG: 18 INJECTION, SOLUTION INTRAMUSCULAR; INTRAVENOUS at 08:03

## 2017-12-12 RX ADMIN — DEXAMETHASONE SODIUM PHOSPHATE 10 MG: 10 INJECTION INTRAMUSCULAR; INTRAVENOUS at 08:02

## 2017-12-12 RX ADMIN — FENTANYL CITRATE 100 MCG: 50 INJECTION, SOLUTION INTRAMUSCULAR; INTRAVENOUS at 08:02

## 2017-12-12 RX ADMIN — NEOSTIGMINE METHYLSULFATE 4 MG: 1 INJECTION, SOLUTION INTRAMUSCULAR; INTRAVENOUS; SUBCUTANEOUS at 09:26

## 2017-12-12 RX ADMIN — HYDROMORPHONE HYDROCHLORIDE 0.2 MG: 1 INJECTION, SOLUTION INTRAMUSCULAR; INTRAVENOUS; SUBCUTANEOUS at 08:42

## 2017-12-12 RX ADMIN — ONDANSETRON 4 MG: 2 INJECTION INTRAMUSCULAR; INTRAVENOUS at 09:12

## 2017-12-12 RX ADMIN — HYDROMORPHONE HYDROCHLORIDE 0.2 MG: 1 INJECTION, SOLUTION INTRAMUSCULAR; INTRAVENOUS; SUBCUTANEOUS at 08:40

## 2017-12-12 RX ADMIN — OXYCODONE HYDROCHLORIDE 10 MG: 10 TABLET ORAL at 11:07

## 2017-12-12 RX ADMIN — HYDROMORPHONE HYDROCHLORIDE 0.4 MG: 1 INJECTION, SOLUTION INTRAMUSCULAR; INTRAVENOUS; SUBCUTANEOUS at 08:47

## 2017-12-12 RX ADMIN — GLYCOPYRROLATE 0.6 MG: 0.2 INJECTION, SOLUTION INTRAMUSCULAR; INTRAVENOUS at 09:26

## 2017-12-12 RX ADMIN — HYDROMORPHONE HYDROCHLORIDE 1 MG: 1 INJECTION, SOLUTION INTRAMUSCULAR; INTRAVENOUS; SUBCUTANEOUS at 12:31

## 2017-12-12 RX ADMIN — FENTANYL CITRATE 25 MCG: 50 INJECTION INTRAMUSCULAR; INTRAVENOUS at 10:27

## 2017-12-12 RX ADMIN — ROCURONIUM BROMIDE 10 MG: 10 INJECTION INTRAVENOUS at 08:53

## 2017-12-12 RX ADMIN — SODIUM CHLORIDE, SODIUM LACTATE, POTASSIUM CHLORIDE, AND CALCIUM CHLORIDE: .6; .31; .03; .02 INJECTION, SOLUTION INTRAVENOUS at 09:15

## 2017-12-12 RX ADMIN — HEPARIN SODIUM 5000 UNITS: 5000 INJECTION, SOLUTION INTRAVENOUS; SUBCUTANEOUS at 07:53

## 2017-12-12 NOTE — OP NOTE
OPERATIVE REPORT  PATIENT NAME: Fred Chao    :  1975  MRN: [id number]  Pt Location: [facility] OR ROOM 08    SURGERY DATE: 2017    Surgeon(s) and Role: Terence Burnham, DO - Primary     * Tavia Guido MD - Assisting     * Destiny Leal - Assisting    Preop Diagnosis:  Abdominal pain [R10 9]    Post-Op Diagnosis Codes:     * Abdominal pain [R10 9]    Procedure(s) (LRB):  LAPAROSCOPY DIAGNOSTIC  WITH LYSIS OF ADHESIONS (N/A)    Specimen(s):  * No specimens in log *    Estimated Blood Loss:   Minimal    Drains:  [REMOVED] Urethral Catheter Latex 16 Fr  (Removed)   Removed 17 0937   Collection Container Standard drainage bag 2017  8:21 AM   Number of days: 0       Anesthesia Type:   General    Operative Indications:  Abdominal pain [R10 9]    Operative Findings:  Normal small bowel, minimal adhesions    Complications:   None    Procedure and Technique:  The patient was identified by name and armband in the preoperative holding area  She is then brought to the operating room, where general anesthesia was induced, and endotracheal tube was placed by the anesthesiology team   She was placed in the supine position with the right arm tucked  Her abdomen was prepped and draped in the usual sterile fashion  A brief time-out was called, and all in the room were in agreement  Abdomen was entered using a safe entry technique with a Veress needle in the left upper quadrant  Saline and a syringe with the plunger removed freely flowed into the abdomen  The abdomen is then insufflated to a pressure of 15 mm of mercury  A 5 trocar was placed in the left upper quadrant under direct visualization using a 5 mm scope  The abdomen was inspected for adhesions, and none were noted in the anterior abdominal wall  Additional 5 mm trocars were placed in the infraumbilical region and right lateral abdomen  The gallbladder fossa was inspected, and adhesions to the omentum were noted    These or taken down with combination of electrocautery and blunt dissection  There were no abnormalities in countered  Attention was then turned to the pelvis  Loops of bowel were swept away from the pelvis, and the vaginal cuff was noted to be intact  Both ovaries were normal in appearance  Using 2 Pristige graspers, the entirety of the small bowel was run from the ligament of Treitz to the ileocecal valve, and all of the small bowel appeared normal  Multiple images were taken of the abdomen  The abdomen was then deflated, and the trocars were removed  Skin was closed using 4 O Monocryl suture and Histoacryl  All sponge, instrument, and needle counts were correct x2  RF wanding was negative for retained foreign bodies  Dr Emma Andrea was present for the entire procedure      Patient Disposition:  PACU     SIGNATURE: Debbi Marie  DATE: December 12, 2017  TIME: 9:53 AM

## 2017-12-12 NOTE — DISCHARGE INSTRUCTIONS
Exploratory Laparoscopy   WHAT YOU NEED TO KNOW:   Exploratory laparoscopy is surgery to look for causes of pain, abnormal growths, bleeding, or disease in your abdomen  During this surgery, small incisions are made in your abdomen  A small scope and tools are inserted through these incisions  A scope is a flexible tube with a light and camera on the end  DISCHARGE INSTRUCTIONS:   Medicines:   · Antibiotics: This medicine is given to fight or prevent an infection caused by bacteria  · Pain medicine: You may be given a prescription medicine to decrease pain  Do not wait until the pain is severe before you take this medicine  · Take your medicine as directed  Contact your healthcare provider if you think your medicine is not helping or if you have side effects  Tell him or her if you are allergic to any medicine  Keep a list of the medicines, vitamins, and herbs you take  Include the amounts, and when and why you take them  Bring the list or the pill bottles to follow-up visits  Carry your medicine list with you in case of an emergency  Follow up with your healthcare provider or surgeon as directed: You may need to return to have your stitches removed  Write down your questions so you remember to ask them during your visits  Wound care:   · Follow your healthcare provider or surgeon's instructions: You may need to keep the bandages on your incisions for 1 to 2 days or until your follow-up visit  After your follow-up visit, you may need to change your bandage 1 to 2 times a day  · Wash your hands:  Use soap and warm water to wash your hands  Do this before and after you care for your wound  Hand washing helps prevent an infection  · Remove your bandages gently:  If the bandage sticks to your wound, use warm water on the bandage and lift it off slowly  Lift the edges toward the center of your wound  Carefully wash around the wound with soap and water   Try not to get soap and water directly on your wound  Dry the area and put on new, clean bandages as directed  Change your bandages when they get wet or dirty  Ask how to clean your wounds after your stitches are removed  Self-care:   · Pain:  You may have neck or shoulder pain from gas used during your surgery  Rest and use heat on your shoulder to help decrease the pain  You may be more comfortable if you elevate your head and shoulders on several pillows  · Rest:  You may feel like resting more after your surgery  Slowly start to do more each day  Rest when you feel it is needed  · Prevent constipation:  High-fiber foods, extra liquids, and regular exercise can help you prevent constipation  Examples of high-fiber foods are fruit and bran  Prune juice and water are good liquids to drink  Regular exercise helps your digestive system work  You may also be told to take over-the-counter fiber and stool softener medicines  Take these items as directed  · Vaginal bleeding: You may have vaginal bleeding for a day or two if your laparoscopy was done for a female problem  Ask when you can bathe:  Ask your healthcare provider when you can take a shower or bath  Contact your healthcare provider or surgeon if:   · You have a fever  · You have pain in your abdomen or shoulder that does not go away after 2 days or gets worse  · You have more vaginal bleeding or discharge than you expected  · You have trouble having a bowel movement, or have diarrhea often  · You have repeated vomiting  · You have chills, a cough, or feel weak and achy  · Your stitches are swollen, red, or have pus coming from them  · You have questions or concerns about your condition or care  Seek care immediately or call 911 if:   · Your incisions come apart  · Your incisions bleed, or blood soaks through your bandage  · Your arm or leg feels warm, tender, and painful  It may look swollen and red      · You suddenly feel lightheaded and short of breath  · You have chest pain when you take a deep breath or cough  You may cough up blood  © 2017 [employer] Information is for End User's use only and may not be sold, redistributed or otherwise used for commercial purposes  All illustrations and images included in CareNotes® are the copyrighted property of [employer] M [employer]  or [address]  The above information is an  only  It is not intended as medical advice for individual conditions or treatments  Talk to your doctor, nurse or pharmacist before following any medical regimen to see if it is safe and effective for you

## 2017-12-12 NOTE — PROGRESS NOTES
I performed a search on the [address] website on this patient for past prescriptions of controlled substances      Alice Hughes PA-C  12/12/2017 1:41 PM

## 2017-12-12 NOTE — ANESTHESIA POSTPROCEDURE EVALUATION
Post-Op Assessment Note      CV Status:  Stable    Mental Status:  Alert and awake    Hydration Status:  Euvolemic    PONV Controlled:  Controlled    Airway Patency:  Patent    Post Op Vitals Reviewed: Yes          Staff: Anesthesiologist, CRNA       Comments: Pt resting comfortably, VSS          BP      Temp (P) 98 °F (36 7 °C) (12/12/17 0947)    Pulse (P) 92 (12/12/17 0947)   Resp (P) 17 (12/12/17 0947)    SpO2

## 2017-12-12 NOTE — ANESTHESIA PREPROCEDURE EVALUATION
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   Ulcerative colitis ([facility] [address] )  Myocardial infarction NSTEMI  pt denies, documented in cardio note       Review of Systems/Medical History          Cardiovascular   Pulmonary  Asthma: well controlled/ stable , ,        GI/Hepatic            Endo/Other  History of thyroid disease , hypothyroidism, Arthritis     GYN       Hematology   Musculoskeletal  Rheumatoid arthritis ,        Neurology   Psychology   Anxiety,            Physical Exam    Airway    Mallampati score: I  TM Distance: >3 FB  Neck ROM: full     Dental   No notable dental hx     Cardiovascular      Pulmonary      Other Findings        Anesthesia Plan  ASA Score- 3       Anesthesia Type- general with ASA Monitors  Additional Monitors:   Airway Plan: ETT  Comment: Will pretreat with prednisone and benadryl for severe corn allergy          Induction- intravenous  Informed Consent- Anesthetic plan and risks discussed with patient and spouse  I personally reviewed this patient with the CRNA  Discussed and agreed on the Anesthesia Plan with the CRNA         Lab Results   Component Value Date    GLUCOSE 87 09/29/2017    ALBUMIN 3 7 12/06/2017    ALT 28 12/06/2017    AST 15 12/06/2017    BUN 8 12/06/2017    CALCIUM 8 4 12/06/2017     12/06/2017    CHOL 149 12/06/2017    CO2 27 12/06/2017    CREATININE 0 61 12/06/2017    HDL 61 (H) 12/06/2017    INR 1 04 08/26/2017    HCT 43 5 12/06/2017    HGB 14 8 12/06/2017    PROT 6 8 12/06/2017    HGBA1C 5 0 08/27/2017    MG 2 3 09/29/2017    PHOS 2 9 09/29/2017     12/06/2017    K 4 8 12/06/2017     12/06/2017    TRIG 45 12/06/2017    WBC 4 43 12/06/2017

## 2017-12-14 ENCOUNTER — GENERIC CONVERSION - ENCOUNTER (OUTPATIENT)
Dept: FAMILY MEDICINE CLINIC | Facility: CLINIC | Age: 42
End: 2017-12-14

## 2017-12-21 ENCOUNTER — GENERIC CONVERSION - ENCOUNTER (OUTPATIENT)
Dept: OTHER | Facility: OTHER | Age: 42
End: 2017-12-21

## 2017-12-22 ENCOUNTER — ALLSCRIPTS OFFICE VISIT (OUTPATIENT)
Dept: FAMILY MEDICINE CLINIC | Facility: CLINIC | Age: 42
End: 2017-12-22
Payer: COMMERCIAL

## 2017-12-22 PROCEDURE — 99213 OFFICE O/P EST LOW 20 MIN: CPT | Performed by: FAMILY MEDICINE

## 2017-12-24 NOTE — PROGRESS NOTES
Assessment   1  Postoperative visit (V58 49) (Z48 89)    Plan   Postoperative visit    · Follow-up as previously scheduled Evaluation and Treatment  Follow-up  Status:    Complete  Done: [id number]    Discussion/Summary      Stop all cremes to the affected areas  She will try Zyrtec 10 mg daily and Ranitidine 150 mg BID x 1 week  She has both of these at home  up as scheduled  Chief Complaint   Patient is here for check up on surgery she had a week ago with Dr Silke Grant she says she has 2 incisions one that stared draining alittle bit yesterday around 3 am around the naval area and also a smaller incision next to it that is red like the naval one is too  She just wants you to look at it and make sure its okay she says it hurts and its extremely itchy she had a follow up appointment with Dr Silke Grant and he didnât seem to concern he didnât feel it was hot to touch and she says she has a follow up with chase next week  History of Present Illness   37 yo female presets for above, She had wound check yesterday and was started on Keflex  She wants another opinion  she had laparoscopic diagnostic surgery with lysis of adhesions on 12/12/17  has now started using Flagyl and a fungal creme on the affected areas  She also started using oral Benadryl  Review of Systems        Constitutional: No fever, no chills, feels well, no tiredness, no recent weight gain or weight loss  Eyes: No complaints of eye pain, no red eyes, no eyesight problems, no discharge, no dry eyes, no itching of eyes  ENT: no complaints of earache, no loss of hearing, no nose bleeds, no nasal discharge, no sore throat, no hoarseness  Integumentary: as noted in HPI  Active Problems   1  Abdominal pain (789 00) (R10 9)   2  Activity involving volleyball (E007 7) (Y93 68)   3  Acute respiratory failure with hypoxia (518 81) (J96 01)   4  Anxiety (300 00) (F41 9)   5  Atypical chest pain (786 59) (R07 89)   6   CHF (congestive heart failure) (428 0) (I50 9)   7  Colitis (558 9) (K52 9)   8  Crohn's colitis (555 1) (K50 10)   9  Encounter for routine gynecological examination (V72 31) (Z01 419)   10  Encounter for screening mammogram for malignant neoplasm of breast (V76 12)      (Z12 31)   11  Fatigue (780 79) (R53 83)   12  GI bleed (578 9) (K92 2)   13  History of biliary stent insertion (V45 89) (Z98 890)   14  Hypothyroidism (244 9) (E03 9)   15  Idiopathic urticaria (708 1) (L50 1)   16  Insomnia (780 52) (G47 00)   17  Mast cell disorder (757 33) (D47 09)   18  Meniere disease (386 00) (H81 09)   19  Non-ST elevation myocardial infarction (NSTEMI), type 2 (410 70) (I21 A1)   20  Postoperative visit (V58 49) (Z48 89)   21  Right upper quadrant abdominal pain (789 01) (R10 11)   22  Screening for depression (V79 0) (Z13 89)   23  Septic shock (038 9,785 52,995 92) (A41 9,R65 21)   24  Severe sepsis (038 9,995 92) (A41 9,R65 20)   25  Swelling of lower extremity (729 81) (M79 89)   26  Visual disturbance (368 9) (H53 9)    Past Medical History   1  History of Acute pancreatitis, unspecified pancreatitis type   2  History of pregnancy (V13 29)   3  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)     The active problems and past medical history were reviewed and updated today  Surgical History   1  History of Ankle Surgery   2  History of Appendectomy   3  History of Cholecystectomy   4  History of Laparoscopy (Diagnostic)   5  History of Laparoscopy With Total Hysterectomy   6  History of Liver Surgery     The surgical history was reviewed and updated today  Family History   Mother    1  Family history of arthritis (V17 7) (Z82 61)   2  Family history of cardiac disorder (V17 49) (Z82 49)   3  Family history of kidney stones (V18 69) (Z84 1)   4  Family history of rheumatoid arthritis (V17 7) (Z82 61)  Father    5  Family history of cardiac disorder (V17 49) (Z82 49)   6   Family history of diabetes mellitus (V18 0) (Z83 3)   7  Family history of hyperlipidemia (V18 19) (Z83 49)   8  Family history of hypertension (V17 49) (Z82 49)  Daughter    5  Family history of asthma (V17 5) (Z82 5)  Son    8  Family history of autism (V17 0) (Z81 8)     The family history was reviewed and updated today  Social History    · Activity involving volleyball (E007 7) (T49 67)   ·    · Never a smoker   · Social alcohol use (Z78 9)  The social history was reviewed and updated today  The social history was reviewed and is unchanged  Current Meds    1  Benadryl TABS; Therapy: (Recorded:04Oct2017) to Recorded   2  Cephalexin 500 MG Oral Capsule; TAKE 1 CAPSULE 3 TIMES DAILY UNTIL GONE;     Therapy: 93Flg6028 to (Evaluate:04Lmd5793)  Requested for: 18Twu3726; Last     Rx:21Dec2017 Ordered   3  Cholecalciferol 400 UNIT/ML LIQD;     Therapy: (Recorded:04Oct2017) to Recorded   4  Florastor 250 MG Oral Capsule; TAKE 1 CAPSULE TWICE DAILY; Therapy: (Recorded:04Oct2017) to Recorded   5  Furosemide 20 MG Oral Tablet; Take 1 tablet daily; Therapy: 84VJI2385 to (Evaluate:23Atb7764)  Requested for: 35LOJ4112; Last     Rx:22Nov2017 Ordered   6  Levothyroxine Sodium 75 MCG Oral Tablet; TAKE 1 TABLET DAILY AS DIRECTED; Therapy: (Recorded:56Ipr9041) to Recorded   7  Liothyronine Sodium 5 MCG Oral Tablet; 1 tab bid; Therapy: (Recorded:29Nov2017) to Recorded   8  LORazepam 1 MG Oral Tablet; TAKE ONE(1) TABLET BY MOUTH EVERY 6 TO 8 HOURS     AS NEEDED FOR ANXIETY AND SLEEP; Therapy: 43AYJ0105 to (Evaluate:02Jun2017)  Requested for: 53FWP3663; Last     Rx:03May2017 Ordered   9  Potassium Chloride ER 10 MEQ Oral Capsule Extended Release; take 1 capsule daily; Therapy: 70FEG0558 to (Last Rx:22Nov2017)  Requested for: 22Nov2017 Ordered   10  PredniSONE TABS; take as directed; Therapy: (([phone] [phone]) to Recorded   11  RaNITidine HCl - 150 MG Oral Tablet; TAKE 1 TABLET TWICE DAILY;       Therapy: 86Sto3938 to (Evaluate:58Msd8984) Recorded   12  Singulair 10 MG Oral Tablet; TAKE 1 TABLET AT BEDTIME; Therapy: (Recorded:30Lyh0808) to Recorded   13  TraMADol HCl - 50 MG Oral Tablet; 1 po q 6 hrs prn; Therapy: 24ISJ2954 to (Last Rx:06Ysv5405) Ordered   14  Xolair 150 MG Subcutaneous Solution Reconstituted; INJECT 150 MG      SUBCUTANEOUSLY EVERY 4 WEEKS; Therapy: (Recorded:33Kce0275) to Recorded   15  Xopenex HFA 45 MCG/ACT Inhalation Aerosol; INHALE 2 PUFFS EVERY 4 HOURS AS      NEEDED; Therapy: (Recorded:29Nov2017) to Recorded   16  ZyrTEC Allergy 10 MG Oral Capsule; Therapy: 74Huo5903 to Recorded     The medication list was reviewed and updated today  Allergies   1  Aspergillus Fumigatis SOLN   2  Ibuprofen TABS   3  Penicillins   4  Sulfa Drugs   5  Sulfites   6  Elavil   7  Imitrex TABS   8  Imuran SOLR    Vitals   Vital Signs    Recorded: 22Dec2017 09:19AM   Temperature 98 6 F   Heart Rate 90   Systolic 016   Diastolic 76   Height 5 ft 5 in   Weight 137 lb    BMI Calculated 22 8   BSA Calculated 1 68   O2 Saturation 97     Physical Exam        Constitutional      General appearance: No acute distress, well appearing and well nourished  Eyes      Conjunctiva and lids: No swelling, erythema or discharge  Pupils and irises: Equal, round and reactive to light  Ears, Nose, Mouth, and Throat      External inspection of ears and nose: Normal        Skin      Skin and subcutaneous tissue: Abnormal  -- pink area noted around the wound sites  No discharge noted  Psychiatric      Orientation to person, place, and time: Normal        Mood and affect: Abnormal  -- anxious  Future Appointments      Date/Time Provider Specialty Site   12/28/2017 01:15 PM AMIRA Aguilera   [facility]   01/04/2018 03:15 PM Nehal John DO [facility]    [facility]   12/28/2017 01:45 PM Jessica Rico, [address] [place] [address] [facility]     Signatures    Electronically signed by :  Meghan Vidal, [facility]; Dec 22 2017  9:50AM EST                       (Author)     Electronically signed by : Mounika Parra MD; Dec 23 2017  5:51PM EST                       (Author)

## 2017-12-28 ENCOUNTER — APPOINTMENT (OUTPATIENT)
Dept: FAMILY MEDICINE CLINIC | Facility: CLINIC | Age: 42
End: 2017-12-28
Payer: COMMERCIAL

## 2017-12-28 ENCOUNTER — GENERIC CONVERSION - ENCOUNTER (OUTPATIENT)
Dept: OTHER | Facility: OTHER | Age: 42
End: 2017-12-28

## 2017-12-28 PROCEDURE — 99213 OFFICE O/P EST LOW 20 MIN: CPT | Performed by: FAMILY MEDICINE

## 2018-01-02 ENCOUNTER — TRANSCRIBE ORDERS (OUTPATIENT)
Dept: ADMINISTRATIVE | Facility: HOSPITAL | Age: 43
End: 2018-01-02

## 2018-01-02 DIAGNOSIS — G47.33 OBSTRUCTIVE SLEEP APNEA: Primary | ICD-10-CM

## 2018-01-04 ENCOUNTER — GENERIC CONVERSION - ENCOUNTER (OUTPATIENT)
Dept: OTHER | Facility: OTHER | Age: 43
End: 2018-01-04

## 2018-01-11 NOTE — RESULT NOTES
Message  pt aw dr Bruna Booth and dxed with retinal shadowing and visual changes related to high doses of prednisone and this may have causesd visual issues and continued with low doses pred and gradual taper      Signatures   Electronically signed by : SELIN Diaz; Mar 20 2017 12:18PM EST                       (Author)

## 2018-01-11 NOTE — MISCELLANEOUS
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)   2  Abdominal pain (789 00) (R10 9)    Discussion/Summary  Discussion Summary:   Sees dr Víctor Motley for cariology and had questionable heat failure and did per patient also had questionable blood clots in arms and also on aspirin pt is to have anal manometry and also bowel transit studies before any surgery  Counseling Documentation With Imm: The patient was counseled regarding  Chief Complaint  Chief Complaint Free Text Note Form: Patient states she was in the hospital twice within the past month  She states she went to [facility] ER and was transferred to [place]  Patient states she was in the hospital due to sepsis from a GI infection  She states she went into septic shock and then into hear failure  She states they do not know where the infection was in her GI  Patient states that she has some pain on her upper right quadrant  She states she feels very shaky as well  Patient states she is having night sweats  She denies nausea or vomiting  She states she has 3-4 bowel movements a day  Patient states she has shadowing in her right eye  She states that she has white spots on her arms and legs  She states that the spots are not itchy  History of Present Illness  TCM Communication [facility]: The patient is being contacted for follow-up after hospitalization and pt discharged from [place] with really no clear dx  Communication performed and completed by      Review of Systems  Complete-Female:   Constitutional: No fever, no chills, feels well, no tiredness, no recent weight gain or weight loss  Eyes: No complaints of eye pain, no red eyes, no eyesight problems, no discharge, no dry eyes, no itching of eyes  ENT: no complaints of earache, no loss of hearing, no nose bleeds, no nasal discharge, no sore throat, no hoarseness     Cardiovascular: No complaints of slow heart rate, no fast heart rate, no chest pain, no palpitations, no leg claudication, no lower extremity edema  Respiratory: No complaints of shortness of breath, no wheezing, no cough, no SOB on exertion, no orthopnea, no PND  Gastrointestinal: abdominal pain, but No complaints of abdominal pain, no constipation, no nausea or vomiting, no diarrhea, no bloody stools  Genitourinary: No complaints of dysuria, no incontinence, no pelvic pain, no dysmenorrhea, no vaginal discharge or bleeding  Musculoskeletal: No complaints of arthralgias, no myalgias, no joint swelling or stiffness, no limb pain or swelling  Integumentary: No complaints of skin rash or lesions, no itching, no skin wounds, no breast pain or lump  Neurological: No complaints of headache, no confusion, no convulsions, no numbness, no dizziness or fainting, no tingling, no limb weakness, no difficulty walking  Psychiatric: Not suicidal, no sleep disturbance, no anxiety or depression, no change in personality, no emotional problems  Endocrine: No complaints of proptosis, no hot flashes, no muscle weakness, no deepening of the voice, no feelings of weakness  Hematologic/Lymphatic: No complaints of swollen glands, no swollen glands in the neck, does not bleed easily, does not bruise easily  ROS Reviewed:   ROS reviewed  Active Problems    1  Abdominal pain (789 00) (R10 9)   2  Activity involving volleyball (E007 7) (Y93 68)   3  Acute respiratory failure with hypoxia (518 81) (J96 01)   4  Anxiety (300 00) (F41 9)   5  Atypical chest pain (786 59) (R07 89)   6  Colitis (558 9) (K52 9)   7  Crohn's colitis (555 1) (K50 10)   8  Encounter for routine gynecological examination (V72 31) (Z01 419)   9  Encounter for screening mammogram for malignant neoplasm of breast (V76 12)   (Z12 31)   10  Fatigue (780 79) (R53 83)   11  GI bleed (578 9) (K92 2)   12  History of biliary stent insertion (V45 89) (Z98 890)   13  Hypothyroidism (244 9) (E03 9)   14  Idiopathic urticaria (708 1) (L50 1)   15  Insomnia (780 52) (G47 00)   16  Mast cell disorder (757 33) (D47 09)   17  Screening for depression (V79 0) (Z13 89)   18  Septic shock (038 9,785 52,995 92) (A41 9,R65 21)   19  Severe sepsis (038 9,995 92) (A41 9,R65 20)    Past Medical History    1  History of Acute pancreatitis, unspecified pancreatitis type   2  History of pregnancy (V13 29)   3  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)    Surgical History    1  History of Ankle Surgery   2  History of Appendectomy   3  History of Cholecystectomy   4  History of Laparoscopy (Diagnostic)   5  History of Laparoscopy With Total Hysterectomy   6  History of Liver Surgery  Surgical History Reviewed: The surgical history was reviewed and updated today  Family History  Mother    1  Family history of arthritis (V17 7) (Z82 61)   2  Family history of cardiac disorder (V17 49) (Z82 49)   3  Family history of kidney stones (V18 69) (Z84 1)   4  Family history of rheumatoid arthritis (V17 7) (Z82 61)  Father    5  Family history of cardiac disorder (V17 49) (Z82 49)   6  Family history of diabetes mellitus (V18 0) (Z83 3)   7  Family history of hyperlipidemia (V18 19) (Z83 49)   8  Family history of hypertension (V17 49) (Z82 49)  Daughter    5  Family history of asthma (V17 5) (Z82 5)  Son    8  Family history of autism (V17 0) (Z81 8)    Social History    · Activity involving volleyball (E007 7) (P67 88)   ·    · Never a smoker   · Social alcohol use (Z78 9)  Social History Reviewed: The social history was reviewed and updated today  Current Meds   1  Benadryl TABS; Therapy: (Recorded:04Oct2017) to Recorded   2  Cholecalciferol 400 UNIT/ML LIQD;   Therapy: (Recorded:04Oct2017) to Recorded   3  Florastor 250 MG Oral Capsule; TAKE 1 CAPSULE TWICE DAILY; Therapy: (Recorded:04Oct2017) to Recorded   4  Levothyroxine Sodium 75 MCG Oral Tablet; TAKE 1 TABLET DAILY AS DIRECTED; Therapy: (Recorded:46Hgu2310) to Recorded   5   Liothyronine Sodium 5 MCG Oral Tablet; Take 1 tablet twice daily; Therapy: 91MJA7312 to (Evaluate:02Jan2018)  Requested for: 72QGV4190; Last   Rx:91Niv0594 Ordered   6  LORazepam 1 MG Oral Tablet; TAKE ONE(1) TABLET BY MOUTH EVERY 6 TO 8 HOURS   AS NEEDED FOR ANXIETY AND SLEEP; Therapy: 49QUN5631 to (Evaluate:02Jun2017)  Requested for: 05AII3710; Last   Rx:98Rge0976 Ordered   7  PredniSONE 5 MG Oral Tablet; take 1 tablet as directed; Therapy: 59KSJ5928 to (Last Rx:05Jun2017)  Requested for: 41DSR1692 Ordered   8  PredniSONE TABS; take as directed; Therapy: (78 119 58 38) to Recorded   9  Protonix 40 MG Oral Tablet Delayed Release; Take 1 tablet twice daily; Therapy: (Recorded:04Oct2017) to Recorded   10  Singulair 10 MG Oral Tablet; TAKE 1 TABLET AT BEDTIME; Therapy: (Recorded:49Lsd2766) to Recorded   11  TraMADol HCl - 50 MG Oral Tablet; 1 po q 6 hrs prn; Therapy: 10UBM7361 to (Last Rx:07Tah5101) Ordered   12  Xolair 150 MG Subcutaneous Solution Reconstituted; INJECT 150 MG    SUBCUTANEOUSLY EVERY 4 WEEKS; Therapy: (Recorded:45Zqn8302) to Recorded  Medication List Reviewed: The medication list was reviewed and updated today  Allergies    1  Aspergillus Fumigatis SOLN   2  Ibuprofen TABS   3  Penicillins   4  Sulfa Drugs   5  Sulfites    Vitals  Signs   Recorded: 59DYN5351 08:15AM   Temperature: 98 5 F  Heart Rate: 92  Respiration: 16  Systolic: 058  Diastolic: 68  Height: 5 ft   Weight: 132 lb   BMI Calculated: 25 78  BSA Calculated: 1 56  O2 Saturation: 98    Physical Exam    Constitutional   General appearance: No acute distress, well appearing and well nourished  Eyes   Conjunctiva and lids: No swelling, erythema or discharge  Pupils and irises: Equal, round and reactive to light  Ears, Nose, Mouth, and Throat   External inspection of ears and nose: Normal     Otoscopic examination: Tympanic membranes translucent with normal light reflex  Canals patent without erythema      Nasal mucosa, septum, and turbinates: Normal without edema or erythema  Oropharynx: Normal with no erythema, edema, exudate or lesions  Pulmonary   Respiratory effort: No increased work of breathing or signs of respiratory distress  Auscultation of lungs: Clear to auscultation  Cardiovascular   Palpation of heart: Normal PMI, no thrills  Auscultation of heart: Normal rate and rhythm, normal S1 and S2, without murmurs  Examination of extremities for edema and/or varicosities: Normal     Carotid pulses: Normal     Abdomen   Abdomen: Non-tender, no masses  Liver and spleen: No hepatomegaly or splenomegaly  Lymphatic   Palpation of lymph nodes in neck: No lymphadenopathy  Musculoskeletal   Gait and station: Normal     Digits and nails: Normal without clubbing or cyanosis  Inspection/palpation of joints, bones, and muscles: Normal     Skin   Skin and subcutaneous tissue: Normal without rashes or lesions  Neurologic   Cranial nerves: Cranial nerves 2-12 intact  Reflexes: 2+ and symmetric  Sensation: No sensory loss      Psychiatric   Orientation to person, place, and time: Normal     Mood and affect: Normal          Future Appointments    Date/Time Provider Specialty Site   10/24/2017 12:40 PM Armida Biggs DO Cardiology [address]     Signatures   Electronically signed by : Jesus Hernandez, [facility]; Oct  4 2017  8:53AM EST                       (Author)    Electronically signed by : Cezar Osorio MD; Nov 12 2017  5:24PM EST                       (Author)

## 2018-01-12 VITALS
SYSTOLIC BLOOD PRESSURE: 109 MMHG | WEIGHT: 139 LBS | DIASTOLIC BLOOD PRESSURE: 69 MMHG | BODY MASS INDEX: 23.16 KG/M2 | HEART RATE: 90 BPM | HEIGHT: 65 IN

## 2018-01-13 VITALS
HEIGHT: 60 IN | OXYGEN SATURATION: 98 % | RESPIRATION RATE: 19 BRPM | WEIGHT: 143 LBS | SYSTOLIC BLOOD PRESSURE: 110 MMHG | DIASTOLIC BLOOD PRESSURE: 78 MMHG | BODY MASS INDEX: 28.07 KG/M2 | HEART RATE: 93 BPM

## 2018-01-13 VITALS
SYSTOLIC BLOOD PRESSURE: 110 MMHG | BODY MASS INDEX: 25.91 KG/M2 | WEIGHT: 132 LBS | OXYGEN SATURATION: 98 % | HEIGHT: 60 IN | TEMPERATURE: 98.5 F | HEART RATE: 92 BPM | DIASTOLIC BLOOD PRESSURE: 68 MMHG | RESPIRATION RATE: 16 BRPM

## 2018-01-18 NOTE — MISCELLANEOUS
Message  GI Reminder Recall Pablo Reynaldo:   Date: 10/27/2017   Dear Charmaine EPPERSON:     Review of our records shows you are due for the following: Hospital follow up visit  Please call the following office to schedule your appointment:   [address] [facility], [place] [phone]  We look forward to hearing from you!      Sincerely,     [facility]      Signatures   Electronically signed by : Filipe Hernandez, ; Oct 27 2017  1:42PM EST                       (Author)

## 2018-01-22 VITALS
BODY MASS INDEX: 27.48 KG/M2 | HEART RATE: 86 BPM | DIASTOLIC BLOOD PRESSURE: 67 MMHG | SYSTOLIC BLOOD PRESSURE: 116 MMHG | HEIGHT: 60 IN | WEIGHT: 140 LBS

## 2018-01-22 VITALS
BODY MASS INDEX: 27.09 KG/M2 | SYSTOLIC BLOOD PRESSURE: 108 MMHG | WEIGHT: 138 LBS | HEIGHT: 60 IN | TEMPERATURE: 97.4 F | DIASTOLIC BLOOD PRESSURE: 66 MMHG | HEART RATE: 118 BPM

## 2018-01-22 VITALS
OXYGEN SATURATION: 97 % | HEART RATE: 90 BPM | TEMPERATURE: 98.6 F | DIASTOLIC BLOOD PRESSURE: 76 MMHG | BODY MASS INDEX: 22.82 KG/M2 | HEIGHT: 65 IN | SYSTOLIC BLOOD PRESSURE: 110 MMHG | WEIGHT: 137 LBS

## 2018-01-24 VITALS
TEMPERATURE: 98.9 F | RESPIRATION RATE: 16 BRPM | DIASTOLIC BLOOD PRESSURE: 70 MMHG | OXYGEN SATURATION: 98 % | HEIGHT: 65 IN | HEART RATE: 94 BPM | WEIGHT: 142 LBS | SYSTOLIC BLOOD PRESSURE: 102 MMHG | BODY MASS INDEX: 23.66 KG/M2

## 2018-01-24 VITALS
WEIGHT: 137 LBS | BODY MASS INDEX: 22.82 KG/M2 | HEIGHT: 65 IN | DIASTOLIC BLOOD PRESSURE: 68 MMHG | HEART RATE: 92 BPM | TEMPERATURE: 98.3 F | SYSTOLIC BLOOD PRESSURE: 111 MMHG

## 2018-01-24 VITALS
WEIGHT: 141 LBS | DIASTOLIC BLOOD PRESSURE: 80 MMHG | HEART RATE: 94 BPM | BODY MASS INDEX: 23.49 KG/M2 | HEIGHT: 65 IN | SYSTOLIC BLOOD PRESSURE: 116 MMHG

## 2018-02-12 ENCOUNTER — OFFICE VISIT (OUTPATIENT)
Dept: FAMILY MEDICINE CLINIC | Facility: CLINIC | Age: 43
End: 2018-02-12
Payer: COMMERCIAL

## 2018-02-12 VITALS
HEIGHT: 60 IN | SYSTOLIC BLOOD PRESSURE: 122 MMHG | HEART RATE: 83 BPM | DIASTOLIC BLOOD PRESSURE: 64 MMHG | RESPIRATION RATE: 18 BRPM | OXYGEN SATURATION: 95 % | BODY MASS INDEX: 26.5 KG/M2 | WEIGHT: 135 LBS | TEMPERATURE: 97.2 F

## 2018-02-12 DIAGNOSIS — D89.40 MAST CELL ACTIVATION SYNDROME (HCC): Primary | ICD-10-CM

## 2018-02-12 PROBLEM — I50.9 CHF (CONGESTIVE HEART FAILURE) (HCC): Status: ACTIVE | Noted: 2017-11-29

## 2018-02-12 PROBLEM — M79.7 FIBROMYALGIA: Status: ACTIVE | Noted: 2017-12-28

## 2018-02-12 PROBLEM — K50.10 CROHN'S COLITIS (HCC): Status: ACTIVE | Noted: 2017-08-25

## 2018-02-12 PROBLEM — H53.9 VISUAL DISTURBANCE: Status: ACTIVE | Noted: 2017-11-29

## 2018-02-12 PROBLEM — H81.09 MENIERE DISEASE: Status: ACTIVE | Noted: 2017-11-29

## 2018-02-12 PROCEDURE — 99214 OFFICE O/P EST MOD 30 MIN: CPT | Performed by: FAMILY MEDICINE

## 2018-02-12 RX ORDER — SACCHAROMYCES BOULARDII 250 MG
1 CAPSULE ORAL 2 TIMES DAILY
COMMUNITY
End: 2018-02-12 | Stop reason: ALTCHOICE

## 2018-02-12 RX ORDER — DULOXETIN HYDROCHLORIDE 20 MG/1
1 CAPSULE, DELAYED RELEASE ORAL DAILY
COMMUNITY
End: 2018-02-12 | Stop reason: ALTCHOICE

## 2018-02-12 RX ORDER — TRAMADOL HYDROCHLORIDE 50 MG/1
50 TABLET ORAL EVERY 4 HOURS PRN
COMMUNITY
Start: 2017-02-08 | End: 2018-08-28

## 2018-02-12 RX ORDER — RANITIDINE 150 MG/1
300 TABLET ORAL 2 TIMES DAILY
COMMUNITY
Start: 2017-12-22 | End: 2018-05-21

## 2018-02-12 RX ORDER — POTASSIUM CHLORIDE 750 MG/1
1 CAPSULE, EXTENDED RELEASE ORAL DAILY
COMMUNITY
Start: 2017-11-22 | End: 2018-05-21

## 2018-02-12 RX ORDER — DIPHENHYDRAMINE HCL 25 MG
50 TABLET ORAL 2 TIMES DAILY
COMMUNITY

## 2018-02-12 RX ORDER — LEVOTHYROXINE SODIUM 0.07 MG/1
1 TABLET ORAL DAILY
COMMUNITY
End: 2018-02-12 | Stop reason: ALTCHOICE

## 2018-02-12 RX ORDER — CETIRIZINE HYDROCHLORIDE 10 MG/1
20 CAPSULE, LIQUID FILLED ORAL 2 TIMES DAILY
COMMUNITY
Start: 2017-12-22 | End: 2018-02-23 | Stop reason: CLARIF

## 2018-02-12 RX ORDER — LEVALBUTEROL TARTRATE 45 UG/1
2 AEROSOL, METERED ORAL EVERY 4 HOURS PRN
COMMUNITY
End: 2019-02-12 | Stop reason: ALTCHOICE

## 2018-02-12 RX ORDER — ONDANSETRON 4 MG/1
4 TABLET, FILM COATED ORAL EVERY 6 HOURS PRN
COMMUNITY
End: 2018-10-05

## 2018-02-12 RX ORDER — LORAZEPAM 1 MG/1
1 TABLET ORAL EVERY 6 HOURS PRN
COMMUNITY
Start: 2016-05-19 | End: 2018-05-21 | Stop reason: SDUPTHER

## 2018-02-12 RX ORDER — MONTELUKAST SODIUM 10 MG/1
1 TABLET ORAL
COMMUNITY
End: 2018-02-12 | Stop reason: ALTCHOICE

## 2018-02-12 RX ORDER — FUROSEMIDE 20 MG/1
1 TABLET ORAL DAILY
COMMUNITY
Start: 2017-11-22 | End: 2018-05-21 | Stop reason: SDUPTHER

## 2018-02-12 RX ORDER — LIOTHYRONINE SODIUM 5 UG/1
1 TABLET ORAL 2 TIMES DAILY
COMMUNITY
End: 2018-02-12 | Stop reason: ALTCHOICE

## 2018-02-12 RX ORDER — PREDNISONE 10 MG/1
TABLET ORAL
COMMUNITY
End: 2018-02-12 | Stop reason: ALTCHOICE

## 2018-02-12 NOTE — PROGRESS NOTES
History and Physical  Jozef Cantu 38 y o  female MRN: [id number]      Assessment:   Mast cell syndrome    Plan:  Continue per [facility]  She will reschedule in [facility]  With specialist there  I will contact Cardiology about possible Robbins syndrome  Chief Complaint   Patient presents with    Follow-up        HPI:  Jozef Cantu is a 38 y o  female who presents after hospital discharge at [facility] after a bout of ulcerative colitis  She is now being treated with Remicaide  She has had 2 treatments so far  She is doing much better  She is very frustrated with her care and lack ok medical personael knowledge concerning Mast cell disease  She feels [facility] is not listening to her  Also she is wondering if she has Robbins syndrome  She does not have Cardiology appt until May 2018  Historical Information   Past Medical History:   Diagnosis Date    Anxiety     Asthma     Chronic kidney disease     Deep vein thrombosis (HCC)     Disease of thyroid gland     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    Generalized anxiety disorder 8/26/2017    Heart murmur     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HCC)     Migraine     Myocardial infarction     NSTEMI  pt denies, documented in cardio note    Pancreatitis     sphinger of     RA [facility]oid arthritis) [facility] [address] [facility]     Ulcerative colitis ([facility] [address] )      Past Surgical History:   Procedure Laterality Date    APPENDECTOMY      CHOLECYSTECTOMY      EGD AND COLONOSCOPY N/A 9/12/2017    Procedure: EGD AND COLONOSCOPY;  Surgeon: Nils Moritz, MD;  [facility]: [facility]; Service: Gastroenterology    ERCP N/A 9/15/2017    Procedure: ENDOSCOPIC RETROGRADE CHOLANGIOPANCREATOGRAPHY (ERCP); Surgeon: Tereso Leonardo MD;  [facility];   Service: Gastroenterology    HYSTERECTOMY      KNEE SURGERY Right     LAPAROSCOPIC ENDOMETRIOSIS FULGURATION      ORIF ANKLE FRACTURE Left     WI KNEE SCOPE,MED/LAT MENISECTOMY Left 5/12/2017    Procedure: POSSIBLE MEDIAL MENISECTOMY;  Surgeon: Mallika Cadet MD;  Location: [facility];  Service: Orthopedics    DE KNEE SCOPE,SHAVE ARTICULAR CART Left 5/12/2017    Procedure: KNEE ARTHROSCOPY EVALUATION, CHONDROPLASTY;  Surgeon: Betty Gutierrez MD;  Location: [facility];  Service: Orthopedics    DE LAP,DIAGNOSTIC ABDOMEN N/A 12/12/2017    Procedure: LAPAROSCOPY DIAGNOSTIC  WITH LYSIS OF ADHESIONS;  Surgeon: Ben Vásquez DO;  Location:  MAIN OR;  Service: General     Social History   History   Alcohol Use No     History   Drug Use No     History   Smoking Status    Never Smoker   Smokeless Tobacco    Never Used     No family history on file      Meds/Allergies   Allergies   Allergen Reactions    Aspergillus Fumigatus Other (See Comments), Hives and Swelling    Imitrex [Sumatriptan] Anaphylaxis     Bradycardia, sob, back pressure, head pain,throat tightness    Corn Dextrin      Any corn based products    Elavil [Amitriptyline]     Ibuprofen Hives and Throat Swelling    Imuran [Azathioprine] Other (See Comments)     pancreatitis    Penicillins Hives and Throat Swelling    Penicillium Notatum     Sulfa Antibiotics Hives    Sulfate     Sulfites        Meds:    Current Outpatient Prescriptions:     Cetirizine HCl (ZYRTEC ALLERGY) 10 MG CAPS, Take by mouth, Disp: , Rfl:     cholecalciferol (VITAMIN D) 400 units/mL, Take 2 5 mL by mouth daily, Disp: 1 Bottle, Rfl: 0    diphenhydrAMINE (BENADRYL) 25 mg tablet, Take by mouth, Disp: , Rfl:     furosemide (LASIX) 20 mg tablet, Take 1 tablet by mouth daily, Disp: , Rfl:     levalbuterol (XOPENEX HFA) 45 mcg/act inhaler, Inhale 2 puffs every 4 (four) hours as needed, Disp: , Rfl:     Levalbuterol HCl (XOPENEX IN), Inhale as needed, Disp: , Rfl:     levothyroxine 75 mcg tablet, Take 1 tablet by mouth daily, Disp: 30 tablet, Rfl: 0    liothyronine (CYTOMEL) 5 mcg tablet, Take 5 mcg by mouth 2 (two) times a day  , Disp: , Rfl:     montelukast (SINGULAIR) 10 mg tablet, Take 1 tablet by mouth, Disp: , Rfl:    omalizumab (XOLAIR) 150 mg, Inject under the skin, Disp: , Rfl:     potassium chloride (MICRO-K) 10 MEQ CR capsule, Take 1 capsule by mouth daily, Disp: , Rfl:     predniSONE 5 mg tablet, Take by mouth daily Weaned off for sx , will restart after, Disp: , Rfl:     ranitidine (ZANTAC) 150 mg tablet, Take 1 tablet by mouth 2 (two) times a day, Disp: , Rfl:     saccharomyces boulardii (FLORASTOR) 250 mg capsule, Take 1 capsule by mouth 2 (two) times a day, Disp: 60 capsule, Rfl: 0    traMADol (ULTRAM) 50 mg tablet, Take by mouth, Disp: , Rfl:     Cholecalciferol 400 UNIT/ML LIQD, Take by mouth, Disp: , Rfl:     diphenhydrAMINE (BENADRYL) 50 mg capsule, Take 50 mg by mouth daily at bedtime, Disp: , Rfl:     DULoxetine (CYMBALTA) 20 mg capsule, Take 1 capsule by mouth daily, Disp: , Rfl:     furosemide (LASIX) 20 mg tablet, Take 20 mg by mouth as needed, Disp: , Rfl:     levothyroxine 75 mcg tablet, Take 1 tablet by mouth daily, Disp: , Rfl:     liothyronine (CYTOMEL) 5 mcg tablet, Take 1 tablet by mouth 2 (two) times a day, Disp: , Rfl:     LORazepam (ATIVAN) 1 mg tablet, Take 1 mg by mouth daily at bedtime  , Disp: , Rfl:     LORazepam (ATIVAN) 1 mg tablet, Take by mouth, Disp: , Rfl:     montelukast (SINGULAIR) 10 mg tablet, Take 10 mg by mouth daily at bedtime, Disp: , Rfl:     omalizumab (XOLAIR) 150 mg, Inject under the skin every 28 days, Disp: , Rfl:     oxyCODONE (ROXICODONE) 5 mg immediate release tablet, Take 1 tablet by mouth every 4 (four) hours as needed for moderate pain for up to 20 doses Max Daily Amount: 30 mg, Disp: 20 tablet, Rfl: 0    potassium chloride (MICRO-K) 10 MEQ CR capsule, Take 10 mEq by mouth 2 (two) times a day, Disp: , Rfl:     predniSONE 10 mg tablet, Take by mouth, Disp: , Rfl:     saccharomyces boulardii (FLORASTOR) 250 mg capsule, Take 1 capsule by mouth 2 (two) times a day, Disp: , Rfl:     traMADol (ULTRAM) 50 mg tablet, Take 50 mg by mouth as needed for moderate pain, Disp: , Rfl:       REVIEW OF SYSTEMS  Review of Systems   Constitutional: Positive for fatigue  HENT: Negative  Eyes: Negative  Respiratory: Negative  Cardiovascular: Negative  Gastrointestinal:        As per HPI  Endocrine: Negative  Genitourinary: Negative  Musculoskeletal: Positive for arthralgias  Skin: Negative  Allergic/Immunologic: Negative  Neurological: Negative  Hematological: Negative  Psychiatric/Behavioral: Negative  Current Vitals:   Blood Pressure: 122/64 (02/12/18 1610)  Pulse: 83 (02/12/18 1610)  Temperature: (!) 97 2 °F (36 2 °C) (02/12/18 1610)  Respirations: 18 (02/12/18 1610)  Height: 5' (152 4 cm) (02/12/18 1610)  Weight - Scale: 61 2 kg (135 lb) (02/12/18 1610)  SpO2: 95 % (02/12/18 1610)      PHYSICAL EXAMS:  Physical Exam   Constitutional: She is oriented to person, place, and time  She appears well-developed and well-nourished  HENT:   Head: Normocephalic and atraumatic  Eyes: EOM are normal  Pupils are equal, round, and reactive to light  Neck: Normal range of motion  Neck supple  Cardiovascular: Normal rate and regular rhythm  Pulmonary/Chest: Effort normal and breath sounds normal  She has no wheezes  She has no rales  Neurological: She is alert and oriented to person, place, and time  Skin: Skin is warm and dry  Psychiatric:   Easily upset  Lab Results:          Fenr Kovacs PA-C  2/12/2018, 4:22 PM

## 2018-02-14 ENCOUNTER — EVALUATION (OUTPATIENT)
Dept: PHYSICAL THERAPY | Age: 43
End: 2018-02-14
Payer: COMMERCIAL

## 2018-02-14 DIAGNOSIS — M79.7 FIBROMYALGIA: Primary | ICD-10-CM

## 2018-02-14 PROCEDURE — G8978 MOBILITY CURRENT STATUS: HCPCS | Performed by: PHYSICAL THERAPIST

## 2018-02-14 PROCEDURE — G8979 MOBILITY GOAL STATUS: HCPCS | Performed by: PHYSICAL THERAPIST

## 2018-02-14 PROCEDURE — 97163 PT EVAL HIGH COMPLEX 45 MIN: CPT | Performed by: PHYSICAL THERAPIST

## 2018-02-14 NOTE — PROGRESS NOTES
PT Evaluation     Today's date: 2018  Patient name: Jamal Díaz  : 1975  MRN: [id number]  Referring provider: Kaitlin Albert MD  Dx:   Encounter Diagnosis   Name Primary?  Fibromyalgia Yes                  Assessment  Impairments: abnormal or restricted ROM, activity intolerance, impaired physical strength, lacks appropriate home exercise program and pain with function  Functional limitations: Patient reports pain with prolonged sitting, standing, walking, with excessive activity, with household chores and with recreational activities  Assessment details: Patient seen for PT evaluation for fibromyalgia  Patient presents with the following: decreased UE and LE strength, pain with activities and IADLs, limitations with recreational activities, lacks formal hep to address deficits  PT discussed goals with patient, purpose of aqua therapy  Patient planning to attend 1x/week only d/t living ~ 1 hour away from facility, would like to attend both pool and land therapy at this site- may transition to land based PT in the future once she has a set program/routine from this site  Patient is a good candidate for PT   Thank you for your referrals  Understanding of Dx/Px/POC: good   Prognosis: good    Goals  Impairment Goals to be met within 4 weeks  - Decrease pain to 3/10  - Increase strength to 5/5 throughout B LE s   - Increase strength to 5/5 throughout B UE s  Functional Goals to be met within 4-6 weeks  - Return to Prior Level of Function  - Increase Functional Status Measure to: 50   - Patient will be independent with HEP  - Patient to be able to tolerate household chores with pain <5/10         Plan  Patient would benefit from: skilled PT  Planned modality interventions: thermotherapy: hydrocollator packs  Planned therapy interventions: aquatic therapy, manual therapy, balance, neuromuscular re-education, postural training, patient education, strengthening, stretching, therapeutic exercise and home exercise program  Frequency: 1x week  Duration in weeks: 4  Treatment plan discussed with: patient        Subjective Evaluation    History of Present Illness  Date of onset: 2017  Mechanism of injury: Patient recently diagnosed  for fibromyalgia and was recommended to both land and pool therapy- has a PT place near her home that she'll go to for land therapy, and plans to attend PT at [facility] for aqua therapy  Goal is to learn HEP to be able to exercise in community pools near her home as she lives ~ 1 hour away  Patient recently hospitalized x ~ 2 weeks for Mast Cell disorder - will need to take a antihistamine medication before exercising as her body over produces histamine  Patient has followed up with her PCP and is cleared to exercise in the pool    Pain  Current pain ratin  At best pain rating: 3  At worst pain rating: 10  Location: full body pain  Quality: dull ache, sharp and radiating  Aggravating factors: overhead activity, walking, standing, sitting, lifting and stair climbing  Progression: no change          Objective     Active Range of Motion   Cervical/Thoracic Spine   Cervical    Flexion: 52 degrees   Extension: 12 degrees   Left lateral flexion: 20 degrees   Right lateral flexion: 25 degrees   Left rotation: WFL  Right rotation: WFL  Left Shoulder   Flexion: WFL  Abduction: WFL  External rotation 90°: with pain  Internal rotation 90°: WFL    Right Shoulder   Flexion: WFL  Abduction: WFL    Lumbar   Flexion: 82 degrees   Extension: 17 degrees with pain  Left lateral flexion: 17 degrees   Right lateral flexion: 23 degrees   Left rotation: 20 degrees   Right rotation: 25 degrees with pain  Left Knee   Normal active range of motion    Right Knee   Normal active range of motion    Additional Active Range of Motion Details  Central low back pain and into posterior proximal thigh pain with lumbar extension  L sided low back pain with R rotation > L rotation  Strength/Myotome Testing     Left Shoulder     Planes of Motion   Flexion: 4-   Abduction: 4-   External rotation at 0°: 4   Internal rotation at 0°: 4     Right Shoulder     Planes of Motion   Flexion: 4-   Abduction: 4-   External rotation at 0°: 4   Internal rotation at 0°: 4     Left Elbow   Flexion: 4  Extension: 4-    Right Elbow   Flexion: 4  Extension: 4-    Left Hip   Planes of Motion   Flexion: 4- (pain with release from MMT )  Abduction: 4-  Adduction: 4-    Right Hip   Planes of Motion   Flexion: 4-  Abduction: 4-  Adduction: 4-    Left Knee   Flexion: 4-  Extension: 4-    Right Knee   Flexion: 4-  Extension: 4-      Precautions: Mast Cell disorder, ulcerative colitis, fibromyalgia    Daily Treatment Diary       Exercise Diary                           Lap walking pre/post                                       Ham stretch with strap             SKTC             Piriformis- if able                          iso abd             LAQ             Seated hip flexion                          Standing rows and extension paddle            Shld abd/add             shld scaption             shld IR/ER?                           Standing hip 3 way             Standing ham curls             HR/TR             sidestepping             squats             Step ups             1/2 jacks               Cross country skiing               Jogging in place

## 2018-02-20 ENCOUNTER — TELEPHONE (OUTPATIENT)
Dept: FAMILY MEDICINE CLINIC | Facility: CLINIC | Age: 43
End: 2018-02-20

## 2018-02-22 ENCOUNTER — OFFICE VISIT (OUTPATIENT)
Dept: PHYSICAL THERAPY | Age: 43
End: 2018-02-22
Payer: COMMERCIAL

## 2018-02-22 ENCOUNTER — OFFICE VISIT (OUTPATIENT)
Dept: SLEEP CENTER | Facility: CLINIC | Age: 43
End: 2018-02-22
Payer: COMMERCIAL

## 2018-02-22 ENCOUNTER — TELEPHONE (OUTPATIENT)
Dept: SLEEP CENTER | Facility: CLINIC | Age: 43
End: 2018-02-22

## 2018-02-22 VITALS
WEIGHT: 140.4 LBS | HEART RATE: 80 BPM | SYSTOLIC BLOOD PRESSURE: 106 MMHG | DIASTOLIC BLOOD PRESSURE: 82 MMHG | HEIGHT: 65 IN | BODY MASS INDEX: 23.39 KG/M2

## 2018-02-22 DIAGNOSIS — F51.01 PRIMARY INSOMNIA: Primary | ICD-10-CM

## 2018-02-22 DIAGNOSIS — M79.7 FIBROMYALGIA: Primary | ICD-10-CM

## 2018-02-22 DIAGNOSIS — G47.33 OBSTRUCTIVE SLEEP APNEA: ICD-10-CM

## 2018-02-22 PROCEDURE — 97110 THERAPEUTIC EXERCISES: CPT | Performed by: PHYSICAL THERAPIST

## 2018-02-22 PROCEDURE — 99204 OFFICE O/P NEW MOD 45 MIN: CPT | Performed by: INTERNAL MEDICINE

## 2018-02-22 PROCEDURE — 97112 NEUROMUSCULAR REEDUCATION: CPT | Performed by: PHYSICAL THERAPIST

## 2018-02-22 RX ORDER — DOXEPIN HYDROCHLORIDE 50 MG/1
50 CAPSULE ORAL
Qty: 30 CAPSULE | Refills: 2 | Status: SHIPPED | OUTPATIENT
Start: 2018-02-22 | End: 2018-02-22

## 2018-02-22 RX ORDER — PROMETHAZINE HYDROCHLORIDE 25 MG/1
25 TABLET ORAL EVERY 6 HOURS PRN
COMMUNITY
End: 2019-02-12 | Stop reason: ALTCHOICE

## 2018-02-22 NOTE — PROGRESS NOTES
Daily Note     Today's date: 2018  Patient name: Carlos Alberto Alvarenga  : 1975  MRN: [id number]  Referring provider: Herman Holder MD  Dx:   Encounter Diagnosis     ICD-10-CM    1  Fibromyalgia M79 7                   Subjective: No new complaints since IE   Reports that she's tried some stretches at home, walking regularly and feels her pain is less overall since IE   Objective: See treatment diary below    Precautions- h/o L hip labral tear    Specialty Daily Treatment Diary     LAND  Exercise Diary                 Bike  L1 5'               Ham stretch 3x :20       SKTC 3x :20       LTR 3x :20               bridges 20x       Sup DLS iso abd 41S :05       Sup DLS alt arm 04U       Sup DLS alt kick 91L       Alt arm/legs 20x               SLR flexion 10x       S/l hip abd 10x               Leg press  60# 10x                                   Modalities                                            Assessment: Tolerated treatment well  Patient demonstrated fatigue post treatment and would benefit from continued PT Scheduled for pool in 2 weeks  Patient able to tolerate session well today, no increase in c/o pain  Progressed home program to include DLS exercises  Plan: Continue per plan of care

## 2018-02-22 NOTE — PROGRESS NOTES
Consultation - Sleep Center   Man Curran : 1975  MRN: [id number]      Assessment:  The patient has chronic insomnia which began during her first pregnancy and has gotten worse ever since  There seems to be some connection with her underlying illness of mast cell activation syndrome/ulcerative colitis/fibromyalgia, which seems even more likely, in the sense that her sleep improves when she is taking prednisone  Ambien caused somnambulism and lorazepam was not effective  I would prefer to find a non benzodiazepine or non benzo receptor agonist to treat long term  I have selected doxepin since there is some antihistamine effect along with it as well as some slight antidepressant effect  Although she has some component of restless legs, her insomnia predated those symptoms  Furthermore, she has tried gabapentin in the past which was not helpful  Plan:  Doxepin 50 mg by mouth at bedtime  Alternative therapy may be considered depending on her response  Follow up:  1 month    History of Present Illness:   37 y  o female with a complex medical history including mast cell activation syndrome, fibromyalgia, ulcerative colitis, cholecystitis with sepsis and heart failure, who has experienced difficulty initiating sleep for many years  Her symptoms started 21 years ago, during the pregnancy with her first child  Things seem to have gotten worse since then  She reports many nights when she does not sleep at all, however she will not feel drowsy in general during the day  Those periods will be followed by hypersomnia and spending the next 3 or 4 days in bed, primarily asleep  It is worth observing that her sleep quality was improved on prednisone rather than worse  It is possible that underlying inflammation may be partially responsible for her symptoms  Her sleep hygiene is unremarkable  She is extremely careful with her diet because of her underlying illnesses    She has had extensive evaluation and treatment at [facility] and at [facility]  The patient has no other sleep symptoms  Review of Systems:      Genitourinary frequent urination and frequent urination at night   Cardiology palpitations/fluttering feeling in your chest and lightheadedness   Gastrointestinal none   Neurology headaches, muscle weakness, numbness/tingling of an extremity, forgetfulness, decreased concentration, confusion, difficulity finding words, slurred speech and loss/change of vision   Constitutional fevers and chills   Integumentary rash, hives and easy brusing   Psychiatry anxiety   Musculoskeletal joint pain, muscle tenderness/aching, back pain and muscle twitching   Pulmonary shortness of breath and chest tightness   ENT nasal or sinus congestion, post nasal drip, ringing in ears and decreased hearing   Endocrine excessive thirst and excessive hunger   Hematological easy brusing and abnormal blood loss           Historical Information    Past Medical History:  As above, plus anxiety disorder, rheumatoid arthritis, POTS    Past Surgical History:  APPENDECTOMY         CHOLECYSTECTOMY        EGD AND COLONOSCOPY N/A 9/12/2017     Procedure: EGD AND COLONOSCOPY;  Surgeon: Sathya Alvarez MD;  [facility]; Service: Gastroenterology    ERCP N/A 9/15/2017     Procedure: ENDOSCOPIC RETROGRADE CHOLANGIOPANCREATOGRAPHY (ERCP); Surgeon: Dougie Henson MD;  [facility];   Service: Gastroenterology    HYSTERECTOMY        KNEE SURGERY Right      LAPAROSCOPIC ENDOMETRIOSIS FULGURATION        ORIF ANKLE FRACTURE Left      MT KNEE SCOPE,MED/LAT MENISECTOMY Left 5/12/2017     Procedure: POSSIBLE MEDIAL MENISECTOMY;  Surgeon: Nick Florence MD;  [facility];  Service: Orthopedics    MT KNEE [address] CART Left 5/12/2017     Procedure: KNEE ARTHROSCOPY EVALUATION, CHONDROPLASTY;  Surgeon: Nick Florence MD;  [facility];  Service: Orthopedics     N [facility] N/A 12/12/2017     Procedure: LAPAROSCOPY DIAGNOSTIC  WITH LYSIS OF ADHESIONS;  Surgeon: Milvia Joiner DO;  [facility];  Service: General         Social History  History   Alcohol use: Not on file     History   Drug Use Not on file     History   Smoking Status    Not on file   Smokeless Tobacco    Not on file     Family History: non-contributory     Sleep History: See above     Sleep Schedule:  As above, the patient stays awake for a night or 2 which is followed by periods of hypersomnia     Snoring/Apnea:  No    Medications/Allergies:    Current Outpatient Prescriptions:     Cetirizine HCl (ZYRTEC ALLERGY) 10 MG CAPS, Take 20 mg by mouth 2 (two) times a day  , Disp: , Rfl:     cholecalciferol (VITAMIN D) 400 units/mL, Take 2 5 mL by mouth daily, Disp: 1 Bottle, Rfl: 0    ciprofloxacin-hydrocortisone (CIPRO HC OTIC) otic suspension, 3 drops 2 (two) times a day, Disp: , Rfl:     diphenhydrAMINE (BENADRYL) 25 mg tablet, Take 50 mg by mouth daily at bedtime as needed for itching  , Disp: , Rfl:     furosemide (LASIX) 20 mg tablet, Take 1 tablet by mouth daily, Disp: , Rfl:     levalbuterol (XOPENEX HFA) 45 mcg/act inhaler, Inhale 2 puffs every 4 (four) hours as needed, Disp: , Rfl:     levothyroxine 75 mcg tablet, Take 1 tablet by mouth daily, Disp: 30 tablet, Rfl: 0    liothyronine (CYTOMEL) 5 mcg tablet, Take 5 mcg by mouth 2 (two) times a day  , Disp: , Rfl:     LORazepam (ATIVAN) 1 mg tablet, Take 1 mg by mouth every 6 (six) hours as needed  , Disp: , Rfl:     montelukast (SINGULAIR) 10 mg tablet, Take 10 mg by mouth 2 (two) times a day  , Disp: , Rfl:     omalizumab (XOLAIR) 150 mg, Inject under the skin, Disp: , Rfl:     ondansetron (ZOFRAN) 4 mg tablet, Take 4 mg by mouth every 6 (six) hours as needed for nausea or vomiting, Disp: , Rfl:     potassium chloride (MICRO-K) 10 MEQ CR capsule, Take 1 capsule by mouth daily, Disp: , Rfl:     predniSONE 5 mg tablet, Take 5 mg by mouth daily Weaned off for sx , will restart after  , Disp: , Rfl:     promethazine (PHENERGAN) 25 mg tablet, Take 25 mg by mouth every 6 (six) hours as needed for nausea or vomiting, Disp: , Rfl:     ranitidine (ZANTAC) 150 mg tablet, Take 300 mg by mouth 2 (two) times a day  , Disp: , Rfl:     saccharomyces boulardii (FLORASTOR) 250 mg capsule, Take 1 capsule by mouth 2 (two) times a day, Disp: 60 capsule, Rfl: 0    traMADol (ULTRAM) 50 mg tablet, Take 50 mg by mouth every 4 (four) hours as needed  , Disp: , Rfl:         No notes on file    [place] Sleepiness Scale  Sitting and reading: Would never doze  Watching TV: Would never doze  Sitting, inactive in a public place (e g  a theatre or a meeting): Would never doze  As a passenger in a car for an hour without a break: Would never doze  Lying down to rest in the afternoon when circumstances permit: Would never doze  Sitting and talking to someone: Would never doze  Sitting quietly after a lunch without alcohol: Would never doze  In a car, while stopped for a few minutes in traffic: Would never doze  Total score: 0  [place] Sleepiness Scale: Total score: 0                Objective:    Vital Signs:   Vitals:    02/22/18 1000   BP: 106/82   Pulse: 80   Weight: 63 7 kg (140 lb 6 4 oz)   Height: 5' 5 25" (1 657 m)     Neck Circumference: 35      [place] Sleepiness Scale: Total score: 0    Physical Exam:    General: Alert, appropriate, cooperative, overweight    Head: NC/AT, no retrognathia    Skin: Warm, dry    Neuro: No motor abnormalities, cranial nerves appear intact      Counseling / Coordination of Care  Total clinic time spent today 40 minutes  Greater than 50% of total time was spent with the patient and / or family counseling and / or coordination of care  A description of the counseling / coordination of care: We discussed measures to improve her insomnia    AMIRA Casillas    Board Certified Sleep Specialist

## 2018-02-23 ENCOUNTER — CLINICAL SUPPORT (OUTPATIENT)
Dept: FAMILY MEDICINE CLINIC | Facility: CLINIC | Age: 43
End: 2018-02-23

## 2018-02-23 DIAGNOSIS — E53.8 B12 DEFICIENCY: Primary | ICD-10-CM

## 2018-02-23 DIAGNOSIS — E53.8 VITAMIN B12 DEFICIENCY: Primary | ICD-10-CM

## 2018-02-23 RX ORDER — CYANOCOBALAMIN 1000 UG/ML
1000 INJECTION INTRAMUSCULAR; SUBCUTANEOUS
Status: DISCONTINUED | OUTPATIENT
Start: 2018-02-23 | End: 2018-03-05

## 2018-02-23 RX ADMIN — CYANOCOBALAMIN 1000 MCG: 1000 INJECTION INTRAMUSCULAR; SUBCUTANEOUS at 13:14

## 2018-03-01 ENCOUNTER — APPOINTMENT (OUTPATIENT)
Dept: PHYSICAL THERAPY | Age: 43
End: 2018-03-01
Payer: COMMERCIAL

## 2018-03-02 DIAGNOSIS — F41.9 ANXIETY: Primary | ICD-10-CM

## 2018-03-02 RX ORDER — LORAZEPAM 1 MG/1
1 TABLET ORAL EVERY 6 HOURS PRN
Qty: 30 TABLET | Refills: 0 | OUTPATIENT
Start: 2018-03-02

## 2018-03-05 RX ORDER — CYANOCOBALAMIN 1000 UG/ML
1000 INJECTION INTRAMUSCULAR; SUBCUTANEOUS
Status: DISCONTINUED | OUTPATIENT
Start: 2018-03-05 | End: 2018-04-20

## 2018-03-06 ENCOUNTER — OFFICE VISIT (OUTPATIENT)
Dept: PHYSICAL THERAPY | Age: 43
End: 2018-03-06
Payer: COMMERCIAL

## 2018-03-06 DIAGNOSIS — M79.7 FIBROMYALGIA: Primary | ICD-10-CM

## 2018-03-06 PROCEDURE — 97113 AQUATIC THERAPY/EXERCISES: CPT | Performed by: PHYSICAL THERAPIST

## 2018-03-06 NOTE — PROGRESS NOTES
Daily Note     Today's date: 3/6/2018  Patient name: Rodrigue Beasley  : 1975  MRN: [id number]  Referring provider: Jody Paul MD  Dx:   Encounter Diagnosis     ICD-10-CM    1  Fibromyalgia M79 7                   Subjective: Patient feeling sore from having a FCE yesterday  Patient has been stretching/exercising more at home from last session  Looking forward to pool today  Objective: See treatment diary below    Precautions: Mast Cell disorder, ulcerative colitis, fibromyalgia     Daily Treatment Diary         Exercise Diary   3/6                                             Lap walking pre/post  5x                                                                     Ham stretch with strap  5x:20                     SKTC  5x:20                     Piriformis- if able  -                                             iso abd  -                     LAQ  20x                     Seated hip flexion  20x                                             Standing rows and extension Paddle  20x                     Shld abd/add  20x                     shld scaption                       shld IR/ER  20x                                             Standing hip 3 way  20x                     Standing ham curls  20x                     HR/TR                       sidestepping                       squats                       Step ups                                    Pool pony float 3 min            Pool pony cycle 1 5 min            1/2 jacks                          Cross country skiing                          Jogging in place                                      Assessment: Initiated treatment for the pool today  Good tolerance to treatment, enjoys exercising in the pool  Able to tolerate exercising despite being sore from FCE yesterday  Progressed home program for aqua therapy   Patient found a community pool near her home, plans to check it out; wants to continue with land/pool therapy as scheduled and recommended  Plan: Continue per plan of care

## 2018-03-15 ENCOUNTER — APPOINTMENT (OUTPATIENT)
Dept: PHYSICAL THERAPY | Age: 43
End: 2018-03-15
Payer: COMMERCIAL

## 2018-03-20 ENCOUNTER — APPOINTMENT (OUTPATIENT)
Dept: PHYSICAL THERAPY | Age: 43
End: 2018-03-20
Payer: COMMERCIAL

## 2018-03-22 ENCOUNTER — TELEPHONE (OUTPATIENT)
Dept: SLEEP CENTER | Facility: CLINIC | Age: 43
End: 2018-03-22

## 2018-03-22 ENCOUNTER — APPOINTMENT (OUTPATIENT)
Dept: PHYSICAL THERAPY | Age: 43
End: 2018-03-22
Payer: COMMERCIAL

## 2018-03-22 DIAGNOSIS — F51.04 PSYCHOPHYSIOLOGICAL INSOMNIA: Primary | ICD-10-CM

## 2018-03-22 RX ORDER — ESZOPICLONE 2 MG/1
1 TABLET, FILM COATED ORAL
Qty: 30 TABLET | Refills: 5 | Status: SHIPPED | OUTPATIENT
Start: 2018-03-22 | End: 2018-07-27 | Stop reason: ALTCHOICE

## 2018-03-22 NOTE — TELEPHONE ENCOUNTER
Veverly Boron was denied by insurance  There must be a documented failure to 2 of the preferred alternatives  Patient has failure to Ambien  Dr Lorenza Torres  Discussed with patient and called to [id number] [facility] at [phone]

## 2018-03-23 ENCOUNTER — CLINICAL SUPPORT (OUTPATIENT)
Dept: FAMILY MEDICINE CLINIC | Facility: CLINIC | Age: 43
End: 2018-03-23
Payer: COMMERCIAL

## 2018-03-23 DIAGNOSIS — E53.8 LOW VITAMIN B12 LEVEL: Primary | ICD-10-CM

## 2018-03-23 DIAGNOSIS — E53.8 VITAMIN B 12 DEFICIENCY: ICD-10-CM

## 2018-03-23 PROCEDURE — 96372 THER/PROPH/DIAG INJ SC/IM: CPT | Performed by: FAMILY MEDICINE

## 2018-03-26 RX ORDER — CYANOCOBALAMIN 1000 UG/ML
1000 INJECTION INTRAMUSCULAR; SUBCUTANEOUS
Status: CANCELLED | OUTPATIENT
Start: 2018-03-26

## 2018-03-27 ENCOUNTER — OFFICE VISIT (OUTPATIENT)
Dept: PHYSICAL THERAPY | Age: 43
End: 2018-03-27
Payer: COMMERCIAL

## 2018-04-20 ENCOUNTER — APPOINTMENT (OUTPATIENT)
Dept: FAMILY MEDICINE CLINIC | Facility: CLINIC | Age: 43
End: 2018-04-20
Payer: COMMERCIAL

## 2018-04-20 DIAGNOSIS — E53.8 B12 DEFICIENCY: Primary | ICD-10-CM

## 2018-04-20 PROCEDURE — 96372 THER/PROPH/DIAG INJ SC/IM: CPT | Performed by: FAMILY MEDICINE

## 2018-04-20 RX ORDER — CYANOCOBALAMIN 1000 UG/ML
1000 INJECTION INTRAMUSCULAR; SUBCUTANEOUS
Status: DISCONTINUED | OUTPATIENT
Start: 2018-04-20 | End: 2018-07-16

## 2018-05-09 NOTE — PROGRESS NOTES
5/9/18- Patient discharged- no return to PT after 3/2018- Patient needing to cancel therapy d/t needing to care for her family

## 2018-05-21 ENCOUNTER — OFFICE VISIT (OUTPATIENT)
Dept: FAMILY MEDICINE CLINIC | Facility: CLINIC | Age: 43
End: 2018-05-21
Payer: COMMERCIAL

## 2018-05-21 VITALS
SYSTOLIC BLOOD PRESSURE: 108 MMHG | WEIGHT: 142 LBS | HEART RATE: 92 BPM | RESPIRATION RATE: 16 BRPM | HEIGHT: 65 IN | DIASTOLIC BLOOD PRESSURE: 82 MMHG | TEMPERATURE: 99.1 F | BODY MASS INDEX: 23.66 KG/M2 | OXYGEN SATURATION: 98 %

## 2018-05-21 DIAGNOSIS — D47.09 MAST CELL DISEASE: Primary | ICD-10-CM

## 2018-05-21 DIAGNOSIS — Z09 FOLLOW UP: ICD-10-CM

## 2018-05-21 DIAGNOSIS — R60.9 EDEMA, UNSPECIFIED TYPE: ICD-10-CM

## 2018-05-21 DIAGNOSIS — F41.9 ANXIETY: ICD-10-CM

## 2018-05-21 DIAGNOSIS — B37.0 THRUSH: Primary | ICD-10-CM

## 2018-05-21 DIAGNOSIS — K42.9 UMBILICAL HERNIA WITHOUT OBSTRUCTION AND WITHOUT GANGRENE: ICD-10-CM

## 2018-05-21 PROBLEM — E87.2 LACTIC ACIDOSIS: Status: RESOLVED | Noted: 2017-08-25 | Resolved: 2018-05-21

## 2018-05-21 PROBLEM — R65.20 SEVERE SEPSIS (HCC): Status: RESOLVED | Noted: 2017-08-25 | Resolved: 2018-05-21

## 2018-05-21 PROBLEM — A41.9 SEVERE SEPSIS (HCC): Status: RESOLVED | Noted: 2017-08-25 | Resolved: 2018-05-21

## 2018-05-21 PROBLEM — R10.11 RUQ PAIN: Status: RESOLVED | Noted: 2017-09-10 | Resolved: 2018-05-21

## 2018-05-21 PROBLEM — E87.20 LACTIC ACIDOSIS: Status: RESOLVED | Noted: 2017-08-25 | Resolved: 2018-05-21

## 2018-05-21 PROBLEM — M67.90 DISORDER OF SYNOVIUM: Status: ACTIVE | Noted: 2017-06-23

## 2018-05-21 PROBLEM — L50.1 CHRONIC IDIOPATHIC URTICARIA: Status: ACTIVE | Noted: 2017-05-02

## 2018-05-21 PROBLEM — M94.20 CHONDROMALACIA: Status: ACTIVE | Noted: 2017-05-12

## 2018-05-21 PROBLEM — K92.2 GASTROINTESTINAL HEMORRHAGE: Status: RESOLVED | Noted: 2017-08-25 | Resolved: 2018-05-21

## 2018-05-21 PROCEDURE — 99214 OFFICE O/P EST MOD 30 MIN: CPT | Performed by: FAMILY MEDICINE

## 2018-05-21 RX ORDER — OMEPRAZOLE 40 MG/1
CAPSULE, DELAYED RELEASE ORAL
COMMUNITY
End: 2018-10-05

## 2018-05-21 RX ORDER — OXYCODONE HYDROCHLORIDE AND ACETAMINOPHEN 5; 325 MG/1; MG/1
TABLET ORAL
COMMUNITY
End: 2018-05-21

## 2018-05-21 RX ORDER — BUTALBITAL, ACETAMINOPHEN AND CAFFEINE 50; 325; 40 MG/1; MG/1; MG/1
TABLET ORAL
COMMUNITY
End: 2018-05-21

## 2018-05-21 RX ORDER — PANTOPRAZOLE SODIUM 40 MG/1
40 TABLET, DELAYED RELEASE ORAL 2 TIMES DAILY
COMMUNITY
End: 2020-08-03

## 2018-05-21 RX ORDER — ACETAMINOPHEN AND CODEINE PHOSPHATE 300; 30 MG/1; MG/1
TABLET ORAL
COMMUNITY
End: 2018-05-21

## 2018-05-21 RX ORDER — DIPHENHYDRAMINE HCL 25 MG
CAPSULE ORAL EVERY 4 HOURS
COMMUNITY
End: 2018-05-21

## 2018-05-21 RX ORDER — POTASSIUM BICARBONATE 25 MEQ/1
25 TABLET, EFFERVESCENT ORAL 2 TIMES DAILY
COMMUNITY
End: 2019-02-12 | Stop reason: ALTCHOICE

## 2018-05-21 RX ORDER — SPIRONOLACTONE AND HYDROCHLOROTHIAZIDE 25; 25 MG/1; MG/1
TABLET ORAL
COMMUNITY
End: 2018-05-21

## 2018-05-21 RX ORDER — MOMETASONE FUROATE 1 MG/G
CREAM TOPICAL
COMMUNITY
End: 2018-05-21

## 2018-05-21 RX ORDER — FOLIC ACID 1 MG/1
TABLET ORAL
COMMUNITY
End: 2018-05-21

## 2018-05-21 RX ORDER — MOXIFLOXACIN 5 MG/ML
SOLUTION/ DROPS OPHTHALMIC
COMMUNITY
End: 2018-05-21

## 2018-05-21 RX ORDER — DOXYCYCLINE HYCLATE 100 MG/1
CAPSULE ORAL
COMMUNITY
End: 2018-05-21

## 2018-05-21 RX ORDER — CEFUROXIME AXETIL 250 MG/1
TABLET ORAL
COMMUNITY
End: 2018-05-21

## 2018-05-21 RX ORDER — HYDROCODONE BITARTRATE AND ACETAMINOPHEN 5; 325 MG/1; MG/1
TABLET ORAL
COMMUNITY
End: 2018-05-21

## 2018-05-21 RX ORDER — FUROSEMIDE 20 MG/1
20 TABLET ORAL DAILY
Qty: 30 TABLET | Refills: 5 | Status: SHIPPED | OUTPATIENT
Start: 2018-05-21 | End: 2019-02-12 | Stop reason: ALTCHOICE

## 2018-05-21 RX ORDER — CETIRIZINE HYDROCHLORIDE 10 MG/1
20 TABLET ORAL DAILY
COMMUNITY

## 2018-05-21 RX ORDER — MONTELUKAST SODIUM 4 MG/1
TABLET, CHEWABLE ORAL
COMMUNITY
End: 2018-05-21

## 2018-05-21 RX ORDER — INFLIXIMAB 100 MG/10ML
INJECTION, POWDER, LYOPHILIZED, FOR SOLUTION INTRAVENOUS
COMMUNITY
End: 2019-02-12 | Stop reason: ALTCHOICE

## 2018-05-21 RX ORDER — CYANOCOBALAMIN 1000 UG/ML
1000 INJECTION INTRAMUSCULAR; SUBCUTANEOUS
Status: DISCONTINUED | OUTPATIENT
Start: 2018-05-21 | End: 2018-07-16

## 2018-05-21 RX ORDER — RANITIDINE 150 MG/1
300 CAPSULE ORAL 2 TIMES DAILY
COMMUNITY
End: 2020-08-03

## 2018-05-21 RX ORDER — MOMETASONE FUROATE 50 UG/1
SPRAY, METERED NASAL
COMMUNITY
End: 2018-05-21

## 2018-05-21 RX ORDER — LEVOTHYROXINE SODIUM 50 MCG
TABLET ORAL
COMMUNITY
Start: 2018-05-02 | End: 2018-05-21

## 2018-05-21 RX ORDER — CEFDINIR 300 MG/1
CAPSULE ORAL
COMMUNITY
End: 2018-05-21

## 2018-05-21 RX ORDER — MECLIZINE HYDROCHLORIDE 25 MG/1
TABLET ORAL
COMMUNITY
End: 2018-05-21

## 2018-05-21 RX ORDER — PREDNISONE 1 MG/1
5 TABLET ORAL DAILY
Qty: 30 TABLET | Refills: 5 | Status: SHIPPED | OUTPATIENT
Start: 2018-05-21 | End: 2018-08-28

## 2018-05-21 RX ORDER — POTASSIUM ACETATE 100 %
POWDER (GRAM) MISCELLANEOUS
COMMUNITY
End: 2018-10-05

## 2018-05-21 RX ORDER — LORAZEPAM 1 MG/1
1 TABLET ORAL EVERY 6 HOURS PRN
Qty: 90 TABLET | Refills: 0 | Status: SHIPPED | OUTPATIENT
Start: 2018-05-21 | End: 2019-04-16 | Stop reason: SDUPTHER

## 2018-05-21 RX ORDER — AMOXICILLIN 500 MG/1
CAPSULE ORAL
COMMUNITY
End: 2018-05-21

## 2018-05-21 RX ADMIN — CYANOCOBALAMIN 1000 MCG: 1000 INJECTION INTRAMUSCULAR; SUBCUTANEOUS at 15:08

## 2018-05-21 NOTE — PROGRESS NOTES
History and Physical  Damián Nicolas 37 y o  female MRN: [id number]      Assessment:   Mast cell disorder  Small umbilical hernia  Ulcerative colitis    Plan:  Meds renewed as needed  Surgery referral for umbilical hernia  RTC 4 months or sooner if needed  Chief Complaint   Patient presents with    Follow-up    Fatigue    Urticaria    Rash        HPI:  Damián Nicolas is a 37 y o  female who presents with hx of Mast cell disorder that is being followed at [facility]  She started on Remicade treatments  She reports the 1st 3 treatment went well but this past time she had different pre treatment and did  Not do as well  She has not eaten in the past 4 days  A lot of foods can cause a rash usually on the face  She had recent ct scan which showed small umbilical hernia  Historical Information   Past Medical History:   Diagnosis Date    Anxiety     Asthma     Chronic kidney disease     Deep vein thrombosis (HCC)     Disease of thyroid gland     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    Generalized anxiety disorder 8/26/2017    Heart murmur     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HCC)     Migraine     Myocardial infarction ([address] )     NSTEMI  pt denies, documented in cardio note    Pancreatitis     sphinger of     RA (rheumatoid arthritis) ([address] )     Ulcerative colitis ([address] )      Past Surgical History:   Procedure Laterality Date    APPENDECTOMY      CHOLECYSTECTOMY      EGD AND COLONOSCOPY N/A 9/12/2017    Procedure: EGD AND COLONOSCOPY;  Surgeon: Asad Benjamin MD;  [facility]; Service: Gastroenterology    ERCP N/A 9/15/2017    Procedure: ENDOSCOPIC RETROGRADE CHOLANGIOPANCREATOGRAPHY (ERCP); Surgeon: Genna Pelaez MD;  [facility];   Service: Gastroenterology    HYSTERECTOMY      KNEE SURGERY Right     LAPAROSCOPIC ENDOMETRIOSIS FULGURATION      ORIF ANKLE FRACTURE Left     OR KNEE SCOPE,MED/LAT MENISECTOMY Left 5/12/2017    Procedure: POSSIBLE MEDIAL MENISECTOMY;  Surgeon: My Dickinson Harjinder Ramsay MD;  [facility];  Service: Orthopedics    [place] KNEE [address] CART Left 5/12/2017    Procedure: KNEE ARTHROSCOPY EVALUATION, CHONDROPLASTY;  Surgeon: Kary Ribeiro MD;  [facility];  Service: Orthopedics    [place] LAP,DIAGNOSTIC ABDOMEN N/A 12/12/2017    Procedure: LAPAROSCOPY DIAGNOSTIC  WITH LYSIS OF ADHESIONS;  Surgeon: Thanh Venegas DO;  Location:  [facility] OR;  Service: General     Social History   History   Alcohol Use No     History   Drug Use No     History   Smoking Status    Never Smoker   Smokeless Tobacco    Never Used     No family history on file      Meds/Allergies   Allergies   Allergen Reactions    Aspergillus Fumigatus Other (See Comments), Hives and Swelling    Elavil [Amitriptyline] Anaphylaxis    Imitrex [Sumatriptan] Anaphylaxis     Bradycardia, sob, back pressure, head pain,throat tightness    Corn Dextrin      Any corn based products    Corn Oil Hives    Ibuprofen Hives and Throat Swelling    Imuran [Azathioprine] Other (See Comments)     pancreatitis    Penicillins Hives and Throat Swelling    Penicillium Notatum     Sulfa Antibiotics Hives    Sulfate     Sulfites        Meds:    Current Outpatient Prescriptions:     cetirizine (ZyrTEC) 10 mg tablet, Take 10 mg by mouth daily, Disp: , Rfl:     inFLIXimab (REMICADE) 100 mg, Infuse into a venous catheter, Disp: , Rfl:     pantoprazole (PROTONIX) 40 mg tablet, Take 40 mg by mouth daily, Disp: , Rfl:     Potassium Acetate POWD, by Does not apply route, Disp: , Rfl:     potassium bicarbonate (K-LYTE) 25 MEQ disintegrating tablet, Take 25 mEq by mouth 2 (two) times a day, Disp: , Rfl:     ranitidine (ZANTAC) 150 MG capsule, Take 150 mg by mouth 2 (two) times a day, Disp: , Rfl:     cholecalciferol (VITAMIN D) 400 units/mL, Take 2 5 mL by mouth daily, Disp: 1 Bottle, Rfl: 0    diphenhydrAMINE (BENADRYL) 25 mg tablet, Take 50 mg by mouth daily at bedtime as needed for itching  , Disp: , Rfl:    EPINEPHrine (EPIPEN 2-MARYSOL IJ), EpiPen  prn, Disp: , Rfl:     eszopiclone (LUNESTA) 2 mg tablet, Take 0 5 tablets (1 mg total) by mouth daily at bedtime as needed for sleep for up to 30 days Take immediately before bedtime  Take 1/2 to 1 tablet , Disp: 30 tablet, Rfl: 5    furosemide (LASIX) 20 mg tablet, Take 1 tablet by mouth daily, Disp: , Rfl:     hydrOXYzine HCL (ATARAX) 25 mg tablet, , Disp: , Rfl:     levalbuterol (XOPENEX HFA) 45 mcg/act inhaler, Inhale 2 puffs every 4 (four) hours as needed, Disp: , Rfl:     levothyroxine 75 mcg tablet, Take 1 tablet by mouth daily, Disp: 30 tablet, Rfl: 0    liothyronine (CYTOMEL) 5 mcg tablet, Take 5 mcg by mouth 2 (two) times a day  , Disp: , Rfl:     LORazepam (ATIVAN) 1 mg tablet, Take 1 mg by mouth every 6 (six) hours as needed  , Disp: , Rfl:     montelukast (SINGULAIR) 10 mg tablet, Take 10 mg by mouth 2 (two) times a day  , Disp: , Rfl:     omalizumab (XOLAIR) 150 mg, Inject 300 mg under the skin  , Disp: , Rfl:     omeprazole (PriLOSEC) 40 MG capsule, omeprazole 40 mg capsule,delayed release, Disp: , Rfl:     ondansetron (ZOFRAN) 4 mg tablet, Take 4 mg by mouth every 6 (six) hours as needed for nausea or vomiting, Disp: , Rfl:     predniSONE 5 mg tablet, Take 5 mg by mouth daily Weaned off for sx , will restart after  , Disp: , Rfl:     Probiotic Product (PROBIOTIC-10 PO), floistar, Disp: , Rfl:     promethazine (PHENERGAN) 25 mg tablet, Take 25 mg by mouth every 6 (six) hours as needed for nausea or vomiting, Disp: , Rfl:     traMADol (ULTRAM) 50 mg tablet, Take 50 mg by mouth every 4 (four) hours as needed  , Disp: , Rfl:     Current Facility-Administered Medications:     cyanocobalamin injection 1,000 mcg, 1,000 mcg, Intramuscular, Q30 Days, Cheral Salina, PA-C      REVIEW OF SYSTEMS  Review of Systems   Constitutional: Negative  HENT: Negative  Eyes: Negative  Respiratory: Negative  Cardiovascular: Negative      Gastrointestinal: As per HPI  Skin: Positive for rash  Allergic/Immunologic: Positive for food allergies  Neurological: Negative  Hematological: Negative  Current Vitals:   Blood Pressure: 108/82 (05/21/18 1344)  Pulse: 92 (05/21/18 1344)  Temperature: 99 1 °F (37 3 °C) (05/21/18 1344)  Respirations: 16 (05/21/18 1344)  Height: 5' 5 25" (165 7 cm) (05/21/18 1344)  Weight - Scale: 64 4 kg (142 lb) (05/21/18 1344)  SpO2: 98 % (05/21/18 1344)      PHYSICAL EXAMS:  Physical Exam   Constitutional: She is oriented to person, place, and time  She appears well-developed and well-nourished  HENT:   Head: Normocephalic and atraumatic  Right Ear: External ear normal    Left Ear: External ear normal    Eyes: EOM are normal  Pupils are equal, round, and reactive to light  Neck: Normal range of motion  Neck supple  No thyromegaly present  Cardiovascular: Normal rate, regular rhythm and normal heart sounds  Pulmonary/Chest: Effort normal and breath sounds normal  She has no wheezes  She has no rales  Musculoskeletal: She exhibits no edema  Neurological: She is alert and oriented to person, place, and time  Psychiatric:   CRIES EASILY       Lab Results:          Renetta Orozco PA-C  5/21/2018, 2:03 PM

## 2018-06-04 DIAGNOSIS — B37.0 THRUSH: Primary | ICD-10-CM

## 2018-06-06 ENCOUNTER — TELEPHONE (OUTPATIENT)
Dept: SLEEP CENTER | Facility: CLINIC | Age: 43
End: 2018-06-06

## 2018-06-06 NOTE — TELEPHONE ENCOUNTER
Received message that patient's medication is not working  Called her to discuss but there was no answer and was unable to leave a message

## 2018-06-15 ENCOUNTER — OFFICE VISIT (OUTPATIENT)
Dept: CARDIOLOGY CLINIC | Facility: HOSPITAL | Age: 43
End: 2018-06-15
Payer: COMMERCIAL

## 2018-06-15 ENCOUNTER — APPOINTMENT (OUTPATIENT)
Dept: FAMILY MEDICINE CLINIC | Facility: CLINIC | Age: 43
End: 2018-06-15
Payer: COMMERCIAL

## 2018-06-15 VITALS
DIASTOLIC BLOOD PRESSURE: 70 MMHG | WEIGHT: 141.4 LBS | HEIGHT: 65 IN | HEART RATE: 101 BPM | BODY MASS INDEX: 23.56 KG/M2 | SYSTOLIC BLOOD PRESSURE: 102 MMHG

## 2018-06-15 DIAGNOSIS — J90 BILATERAL PLEURAL EFFUSION: ICD-10-CM

## 2018-06-15 DIAGNOSIS — I50.32 CHRONIC DIASTOLIC CONGESTIVE HEART FAILURE (HCC): Primary | ICD-10-CM

## 2018-06-15 PROCEDURE — 99214 OFFICE O/P EST MOD 30 MIN: CPT | Performed by: INTERNAL MEDICINE

## 2018-06-15 PROCEDURE — 96372 THER/PROPH/DIAG INJ SC/IM: CPT | Performed by: FAMILY MEDICINE

## 2018-06-15 NOTE — PROGRESS NOTES
Cardiology Follow Up    Ashely Green  1975  [id number]  [address] [address]    1  Chronic diastolic congestive heart failure (HCC)  Echo complete with contrast if indicated   2  Bilateral pleural effusion  Echo complete with contrast if indicated       Discussion/Summary:  She was seen previously in the hospital for an episode of diastolic heart failure  Echocardiogram revealed normal diastolic parameters normal systolic function  She has some episodes of fluid retention I do not think are cardiac in nature  I have recommended a 1 year follow-up echocardiogram   She has some mass LD granulation disorder at times has a large histamine reaction  She takes multiple medications and is following with several allergy and Endocrinology doctors  She has no signs of decompensated heart failure on exam and no symptoms of angina  Continue with current medical therapy will follow up on echo see her back in 8 months  Interval History:  49-year-old female I met in the hospital from iatrogenic fluid overload  Overall she has been doing well from a cardiac standpoint  We ordered a echocardiogram last year which showed normal diastolic parameters and stress echo showed no evidence of ischemia  She has 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  From a cardiac standpoint she has no anginal symptoms  She has no lower extremity edema, PND, orthopnea  His been no palpitations      Problem List     Chondromalacia    Crohn's colitis ([address] )    Ulcerative colitis ([address] )    Vitamin D deficiency    Hypokalemia    Hypotension    Throat tightness    Septic shock (HCC)    Bradycardia    Abdominal pain    Headache    Hypophosphatemia    Hypomagnesemia    Generalized anxiety disorder (Chronic)    Hypothyroidism (Chronic)    Low TSH level Hypocalcemia    Hyperglycemia    Acute respiratory failure with hypoxia (HCC)    Bilateral pleural effusion    Acute cardiogenic pulmonary edema (HCC)    Elevated troponin level    Abnormal CT of the abdomen    Leukocytosis    Arm swelling    Thrombophlebitis    Dysuria    Anxiety    CHF (congestive heart failure) (HCC)    Fibromyalgia    Mast cell disorder    Meniere disease    Visual disturbance    Chronic idiopathic urticaria    Disorder of synovium        Past Medical History:   Diagnosis Date    Anxiety     Asthma     Chronic kidney disease     Deep vein thrombosis (HCC)     Disease of thyroid gland     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    Generalized anxiety disorder 8/26/2017    Heart murmur     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     Migraine     Myocardial infarction ([facility] )     NSTEMI  pt denies, documented in cardio note    Pancreatitis     sphinger of     RA (rheumatoid arthritis) ([facility] )     Ulcerative colitis ([facility] )      Social History     Social History    Marital status: /Civil Union     Spouse name: N/A    Number of children: N/A    Years of education: N/A     Occupational History    Not on file  Social History Main Topics    Smoking status: Never Smoker    Smokeless tobacco: Never Used    Alcohol use No    Drug use: No    Sexual activity: Not on file     Other Topics Concern    Not on file     Social History Narrative    No narrative on file      No family history on file  Past Surgical History:   Procedure Laterality Date    APPENDECTOMY      CHOLECYSTECTOMY      EGD AND COLONOSCOPY N/A 9/12/2017    Procedure: EGD AND COLONOSCOPY;  Surgeon: Yolanda Fields MD;  [facility]; Service: Gastroenterology    ERCP N/A 9/15/2017    Procedure: ENDOSCOPIC RETROGRADE CHOLANGIOPANCREATOGRAPHY (ERCP); Surgeon: Leon Palomares MD;  [facility];   Service: Gastroenterology    HYSTERECTOMY      KNEE SURGERY Right     LAPAROSCOPIC ENDOMETRIOSIS FULGURATION  ORIF ANKLE FRACTURE Left     ND KNEE SCOPE,MED/LAT MENISECTOMY Left 5/12/2017    Procedure: POSSIBLE MEDIAL MENISECTOMY;  Surgeon: Radha Rome MD;  [facility];  Service: Orthopedics    [address] [place] [address] Left 5/12/2017    Procedure: KNEE ARTHROSCOPY EVALUATION, CHONDROPLASTY;  Surgeon: Radha Rome MD;  [facility];  Service: Orthopedics    ND LAP,DIAGNOSTIC ABDOMEN N/A 12/12/2017    Procedure: LAPAROSCOPY DIAGNOSTIC  WITH LYSIS OF ADHESIONS;  Surgeon: Waleska Mendoza DO;  Location:  MAIN OR;  Service: General       Current Outpatient Prescriptions:     cetirizine (ZyrTEC) 10 mg tablet, Take 10 mg by mouth daily, Disp: , Rfl:     cholecalciferol (VITAMIN D) 400 units/mL, Take 2 5 mL (1,000 Units total) by mouth daily, Disp: 1 Bottle, Rfl: 0    diphenhydrAMINE (BENADRYL) 25 mg tablet, Take 50 mg by mouth daily at bedtime as needed for itching  , Disp: , Rfl:     EPINEPHrine (EPIPEN 2-MARYSOL IJ), EpiPen  prn, Disp: , Rfl:     eszopiclone (LUNESTA) 2 mg tablet, Take 0 5 tablets (1 mg total) by mouth daily at bedtime as needed for sleep for up to 30 days Take immediately before bedtime    Take 1/2 to 1 tablet , Disp: 30 tablet, Rfl: 5    furosemide (LASIX) 20 mg tablet, Take 1 tablet (20 mg total) by mouth daily, Disp: 30 tablet, Rfl: 5    hydrOXYzine HCL (ATARAX) 25 mg tablet, , Disp: , Rfl:     inFLIXimab (REMICADE) 100 mg, Infuse into a venous catheter, Disp: , Rfl:     levalbuterol (XOPENEX HFA) 45 mcg/act inhaler, Inhale 2 puffs every 4 (four) hours as needed, Disp: , Rfl:     levothyroxine 75 mcg tablet, Take 1 tablet by mouth daily, Disp: 30 tablet, Rfl: 0    liothyronine (CYTOMEL) 5 mcg tablet, Take 5 mcg by mouth 2 (two) times a day  , Disp: , Rfl:     LORazepam (ATIVAN) 1 mg tablet, Take 1 tablet (1 mg total) by mouth every 6 (six) hours as needed for anxiety, Disp: 90 tablet, Rfl: 0    montelukast (SINGULAIR) 10 mg tablet, Take 10 mg by mouth 2 (two) times a day  , Disp: , Rfl:     nystatin (MYCOSTATIN) 100,000 units/mL suspension, Apply 5 mL (500,000 Units total) to the mouth or throat 4 (four) times a day, Disp: 60 mL, Rfl: 0    omalizumab (XOLAIR) 150 mg, Inject 300 mg under the skin  , Disp: , Rfl:     omeprazole (PriLOSEC) 40 MG capsule, omeprazole 40 mg capsule,delayed release, Disp: , Rfl:     ondansetron (ZOFRAN) 4 mg tablet, Take 4 mg by mouth every 6 (six) hours as needed for nausea or vomiting, Disp: , Rfl:     pantoprazole (PROTONIX) 40 mg tablet, Take 40 mg by mouth daily, Disp: , Rfl:     Potassium Acetate POWD, by Does not apply route, Disp: , Rfl:     potassium bicarbonate (K-LYTE) 25 MEQ disintegrating tablet, Take 25 mEq by mouth 2 (two) times a day, Disp: , Rfl:     predniSONE 5 mg tablet, Take 1 tablet (5 mg total) by mouth daily Weaned off for sx , will restart after, Disp: 30 tablet, Rfl: 5    Probiotic Product (PROBIOTIC-10 PO), floistar, Disp: , Rfl:     promethazine (PHENERGAN) 25 mg tablet, Take 25 mg by mouth every 6 (six) hours as needed for nausea or vomiting, Disp: , Rfl:     ranitidine (ZANTAC) 150 MG capsule, Take 150 mg by mouth 2 (two) times a day, Disp: , Rfl:     traMADol (ULTRAM) 50 mg tablet, Take 50 mg by mouth every 4 (four) hours as needed  , Disp: , Rfl:     Current Facility-Administered Medications:     cyanocobalamin injection 1,000 mcg, 1,000 mcg, Intramuscular, Q30 Days, Loras Covert, PA-C, 1,000 mcg at 05/21/18 1508    cyanocobalamin injection 1,000 mcg, 1,000 mcg, Intramuscular, Q30 Days, Loras Covert, PA-C  Allergies   Allergen Reactions    Aspergillus Fumigatus Other (See Comments), Hives and Swelling    Elavil [Amitriptyline] Anaphylaxis    Imitrex [Sumatriptan] Anaphylaxis     Bradycardia, sob, back pressure, head pain,throat tightness    Corn Dextrin      Any corn based products    Corn Oil Hives    Ibuprofen Hives and Throat Swelling    Imuran [Azathioprine] Other (See Comments) pancreatitis    Penicillins Hives and Throat Swelling    Penicillium Notatum     Sulfa Antibiotics Hives    Sulfate     Sulfites        Labs:     Chemistry        Component Value Date/Time     12/06/2017 1316     01/16/2015 1213    K 4 8 12/06/2017 1316    K 4 2 01/16/2015 1213     12/06/2017 1316     01/16/2015 1213    CO2 27 12/06/2017 1316    CO2 29 1 01/16/2015 1213    BUN 8 12/06/2017 1316    BUN 10 01/16/2015 1213    CREATININE 0 61 12/06/2017 1316    CREATININE 0 67 01/16/2015 1213        Component Value Date/Time    CALCIUM 8 4 12/06/2017 1316    CALCIUM 9 6 01/16/2015 1213    ALKPHOS 49 12/06/2017 1316    ALKPHOS 97 01/16/2015 1213    AST 15 12/06/2017 1316    AST 11 01/16/2015 1213    ALT 28 12/06/2017 1316    ALT 51 01/16/2015 1213    BILITOT 0 60 12/06/2017 1316    BILITOT 0 7 01/16/2015 1213            Lab Results   Component Value Date    CHOL 149 12/06/2017     Lab Results   Component Value Date    HDL 61 (H) 12/06/2017     Lab Results   Component Value Date    LDLCALC 79 12/06/2017     Lab Results   Component Value Date    TRIG 45 12/06/2017     No components found for: CHOLHDL    Imaging: No results found  ECG:        ROS    Vitals:    06/15/18 1309   BP: 102/70   Pulse: 101     Vitals:    06/15/18 1309   Weight: 64 1 kg (141 lb 6 4 oz)     Height: 5' 5 2" (165 6 cm)   Body mass index is 23 39 kg/m²      Physical Exam:   General appearance:  Appears stated age, alert, well appearing and in no distress  HEENT:  PERRLA, EOMI, no scleral icterus, no conjunctival pallor  NECK:  Supple, No elevated JVP, no thyromegaly, no carotid bruits  HEART:  Regular rate and rhythm, normal S1/S2, no S3/S4, no murmur or rub  LUNGS:  Clear to auscultation bilaterally, no wheezes rales or rhonchi  ABDOMEN:  Soft, non-tender, positive bowel sounds, no rebound or guarding, no organomegaly   EXTREMITIES:  No edema, normal range of motion  VASCULAR:  Normal pedal pulses, good pulse volume SKIN: No lesions or rashes on exposed skin  NEURO:  CN II-XII intact, no focal deficits

## 2018-06-19 ENCOUNTER — TRANSCRIBE ORDERS (OUTPATIENT)
Dept: ADMINISTRATIVE | Facility: HOSPITAL | Age: 43
End: 2018-06-19

## 2018-06-19 DIAGNOSIS — M25.572 LEFT ANKLE PAIN, UNSPECIFIED CHRONICITY: Primary | ICD-10-CM

## 2018-07-16 ENCOUNTER — CLINICAL SUPPORT (OUTPATIENT)
Dept: FAMILY MEDICINE CLINIC | Facility: CLINIC | Age: 43
End: 2018-07-16
Payer: COMMERCIAL

## 2018-07-16 DIAGNOSIS — E53.8 B12 DEFICIENCY: Primary | ICD-10-CM

## 2018-07-16 DIAGNOSIS — E53.8 VITAMIN B 12 DEFICIENCY: Primary | ICD-10-CM

## 2018-07-16 PROCEDURE — 96372 THER/PROPH/DIAG INJ SC/IM: CPT | Performed by: FAMILY MEDICINE

## 2018-07-16 RX ORDER — CYANOCOBALAMIN 1000 UG/ML
1000 INJECTION INTRAMUSCULAR; SUBCUTANEOUS
Status: DISCONTINUED | OUTPATIENT
Start: 2018-07-16 | End: 2018-09-05

## 2018-07-16 RX ADMIN — CYANOCOBALAMIN 1000 MCG: 1000 INJECTION INTRAMUSCULAR; SUBCUTANEOUS at 15:44

## 2018-07-27 ENCOUNTER — OFFICE VISIT (OUTPATIENT)
Dept: SLEEP CENTER | Facility: CLINIC | Age: 43
End: 2018-07-27
Payer: COMMERCIAL

## 2018-07-27 VITALS
HEIGHT: 65 IN | HEART RATE: 85 BPM | WEIGHT: 146 LBS | SYSTOLIC BLOOD PRESSURE: 122 MMHG | DIASTOLIC BLOOD PRESSURE: 80 MMHG | BODY MASS INDEX: 24.32 KG/M2

## 2018-07-27 DIAGNOSIS — G47.01 INSOMNIA DUE TO MEDICAL CONDITION: Primary | ICD-10-CM

## 2018-07-27 PROCEDURE — 99214 OFFICE O/P EST MOD 30 MIN: CPT | Performed by: INTERNAL MEDICINE

## 2018-07-27 RX ORDER — TEMAZEPAM 30 MG/1
30 CAPSULE ORAL
Qty: 30 CAPSULE | Refills: 2 | Status: SHIPPED | OUTPATIENT
Start: 2018-07-27 | End: 2018-09-18 | Stop reason: SDUPTHER

## 2018-07-27 NOTE — PROGRESS NOTES
Progress Note - Sleep Center   Bruna Pham :1975 MRN: [id number]      Reason for Visit:    37 y  o female with mast cell disease and chronic insomnia    Assessment:  Is opt clonic was not effective for the patient and doxepin was not covered  She is currently taking a combination of lorazepam, Benadryl and Singulair, which she feels helps her to a certain degree  Plan:  Start temazepam 30 mg at bedtime instead of the above combination  Follow up:  2 months  I told the patient to call if the temazepam is not effective  History of Present Illness:  Mast cell disease and chronic insomnia  Medications do not appear to be causing her sleep problems  Historical Information    Past Medical History:   Past Medical History:   Diagnosis Date    Anxiety     Asthma     Chronic kidney disease     [facility]     Disease of thyroid gland     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    Generalized anxiety disorder 2017    Heart murmur     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HCC)     Migraine     Myocardial infarction ([facility] )     NSTEMI  pt denies, documented in cardio note    Pancreatitis     sphinger of     RA (rheumatoid arthritis) ([facility] )     Ulcerative colitis ([facility] )          Past Surgical History:   Past Surgical History:   Procedure Laterality Date    APPENDECTOMY      CHOLECYSTECTOMY      EGD AND COLONOSCOPY N/A 2017    Procedure: EGD AND COLONOSCOPY;  Surgeon: Zhang Vang MD;  [facility]; Service: Gastroenterology    ERCP N/A 9/15/2017    Procedure: ENDOSCOPIC RETROGRADE CHOLANGIOPANCREATOGRAPHY (ERCP); Surgeon: Bárbara Chang MD;  Location:  GI LAB;   Service: Gastroenterology    HYSTERECTOMY      KNEE SURGERY Right     LAPAROSCOPIC ENDOMETRIOSIS FULGURATION      ORIF ANKLE FRACTURE Left     ND KNEE SCOPE,MED/LAT MENISECTOMY Left 2017    Procedure: POSSIBLE MEDIAL MENISECTOMY;  Surgeon: Victoriano Philippe MD;  Location: [facility];  Service: Orthopedics    OH KNEE SCOPE,SHAVE ARTICULAR CART Left 5/12/2017    Procedure: KNEE ARTHROSCOPY EVALUATION, CHONDROPLASTY;  Surgeon: Que Mcgraw MD;  [facility];  Service: Orthopedics    OH LAP,DIAGNOSTIC ABDOMEN N/A 12/12/2017    Procedure: LAPAROSCOPY DIAGNOSTIC  WITH LYSIS OF ADHESIONS;  Surgeon: Jordy Ventura DO;  Location:  MAIN OR;  Service: General         Social History - see chart  History   Alcohol use: Not on file     History   Drug Use Not on file     History   Smoking Status    Not on file   Smokeless Tobacco    Not on file     Family History: History reviewed  No pertinent family history      Medications/Allergies:      Current Outpatient Prescriptions:     cetirizine (ZyrTEC) 10 mg tablet, Take 10 mg by mouth daily, Disp: , Rfl:     cholecalciferol (VITAMIN D) 400 units/mL, Take 2 5 mL (1,000 Units total) by mouth daily, Disp: 1 Bottle, Rfl: 0    diphenhydrAMINE (BENADRYL) 25 mg tablet, Take 50 mg by mouth daily at bedtime as needed for itching  , Disp: , Rfl:     EPINEPHrine (EPIPEN 2-MARYSOL IJ), EpiPen  prn, Disp: , Rfl:     furosemide (LASIX) 20 mg tablet, Take 1 tablet (20 mg total) by mouth daily, Disp: 30 tablet, Rfl: 5    hydrOXYzine HCL (ATARAX) 25 mg tablet, , Disp: , Rfl:     inFLIXimab (REMICADE) 100 mg, Infuse into a venous catheter, Disp: , Rfl:     levalbuterol (XOPENEX HFA) 45 mcg/act inhaler, Inhale 2 puffs every 4 (four) hours as needed, Disp: , Rfl:     levothyroxine 75 mcg tablet, Take 1 tablet by mouth daily, Disp: 30 tablet, Rfl: 0    liothyronine (CYTOMEL) 5 mcg tablet, Take 5 mcg by mouth 2 (two) times a day  , Disp: , Rfl:     LORazepam (ATIVAN) 1 mg tablet, Take 1 tablet (1 mg total) by mouth every 6 (six) hours as needed for anxiety, Disp: 90 tablet, Rfl: 0    montelukast (SINGULAIR) 10 mg tablet, Take 10 mg by mouth 2 (two) times a day  , Disp: , Rfl:     nystatin (MYCOSTATIN) 100,000 units/mL suspension, Apply 5 mL (500,000 Units total) to the mouth or throat 4 (four) times a day, Disp: 60 mL, Rfl: 0    omalizumab (XOLAIR) 150 mg, Inject 300 mg under the skin  , Disp: , Rfl:     omeprazole (PriLOSEC) 40 MG capsule, omeprazole 40 mg capsule,delayed release, Disp: , Rfl:     ondansetron (ZOFRAN) 4 mg tablet, Take 4 mg by mouth every 6 (six) hours as needed for nausea or vomiting, Disp: , Rfl:     pantoprazole (PROTONIX) 40 mg tablet, Take 40 mg by mouth daily, Disp: , Rfl:     Potassium Acetate POWD, by Does not apply route, Disp: , Rfl:     potassium bicarbonate (K-LYTE) 25 MEQ disintegrating tablet, Take 25 mEq by mouth 2 (two) times a day, Disp: , Rfl:     predniSONE 5 mg tablet, Take 1 tablet (5 mg total) by mouth daily Weaned off for sx , will restart after, Disp: 30 tablet, Rfl: 5    Probiotic Product (PROBIOTIC-10 PO), floistar, Disp: , Rfl:     promethazine (PHENERGAN) 25 mg tablet, Take 25 mg by mouth every 6 (six) hours as needed for nausea or vomiting, Disp: , Rfl:     ranitidine (ZANTAC) 150 MG capsule, Take 150 mg by mouth 2 (two) times a day, Disp: , Rfl:     temazepam (RESTORIL) 30 mg capsule, Take 1 capsule (30 mg total) by mouth daily at bedtime as needed for sleep, Disp: 30 capsule, Rfl: 2    traMADol (ULTRAM) 50 mg tablet, Take 50 mg by mouth every 4 (four) hours as needed  , Disp: , Rfl:     Current Facility-Administered Medications:     cyanocobalamin injection 1,000 mcg, 1,000 mcg, Intramuscular, Q30 Days, Marco A Sam PA-C, 1,000 mcg at 07/16/18 4447      Review of Systems      Genitourinary need to urinate more than twice a night and hot flashes at night   Cardiology palpitations/fluttering feeling in the chest and ankle/leg swelling   Gastrointestinal abdominal pain or cramping that disturb sleep    Neurology frequent headaches, awaken with headache, need to move extremities, muscle weakness, numbness/tingling of an extremity, forgetfulness and difficulty with memory   Constitutional fatigue and weight change Integumentary rash or dry skin and itching   Psychiatry anxiety   Musculoskeletal joint pain, muscle aches, back pain and legs twitching/jerking   Pulmonary shortness of breath with activity   ENT ringing in ears   Endocrine excessive thirst and frequent urination   Hematological none         Objective    Vital Signs:   Vitals:    07/27/18 1000   BP: 122/80   Pulse: 85   Weight: 66 2 kg (146 lb)   Height: 5' 5" (1 651 m)     Stanley Sleepiness Scale: Total score: 1    Physical Exam:    General: Alert, appropriate, cooperative, overweight    Head: NC/AT    Skin: Warm, dry    Neuro: No motor abnormalities, cranial nerves appear intact    Psych: Normal affect      Counseling / Coordination of Care  Total clinic time spent today 15 minutes  Greater than 50% of total time was spent with the patient and / or family counseling and / or coordination of care  A description of the counseling / coordination of care: treatment was discussed    AMIRA Woods    Board Certified Sleep Specialist

## 2018-08-10 ENCOUNTER — CLINICAL SUPPORT (OUTPATIENT)
Dept: FAMILY MEDICINE CLINIC | Facility: CLINIC | Age: 43
End: 2018-08-10
Payer: COMMERCIAL

## 2018-08-10 DIAGNOSIS — E53.8 B12 DEFICIENCY: Primary | ICD-10-CM

## 2018-08-10 PROCEDURE — 96372 THER/PROPH/DIAG INJ SC/IM: CPT | Performed by: FAMILY MEDICINE

## 2018-08-10 RX ADMIN — CYANOCOBALAMIN 1000 MCG: 1000 INJECTION INTRAMUSCULAR; SUBCUTANEOUS at 10:07

## 2018-08-28 ENCOUNTER — HOSPITAL ENCOUNTER (EMERGENCY)
Facility: HOSPITAL | Age: 43
Discharge: HOME | End: 2018-08-28
Attending: EMERGENCY MEDICINE | Admitting: EMERGENCY MEDICINE
Payer: COMMERCIAL

## 2018-08-28 VITALS
HEART RATE: 87 BPM | SYSTOLIC BLOOD PRESSURE: 137 MMHG | TEMPERATURE: 98 F | HEIGHT: 65 IN | WEIGHT: 153 LBS | BODY MASS INDEX: 25.49 KG/M2 | RESPIRATION RATE: 20 BRPM | DIASTOLIC BLOOD PRESSURE: 79 MMHG | OXYGEN SATURATION: 99 %

## 2018-08-28 DIAGNOSIS — L03.114 CELLULITIS OF LEFT UPPER ARM: Primary | ICD-10-CM

## 2018-08-28 DIAGNOSIS — D47.09 MAST CELL DISORDER: ICD-10-CM

## 2018-08-28 LAB
ALBUMIN SERPL BCP-MCNC: 3.3 G/DL (ref 3.5–5)
ALP SERPL-CCNC: 54 U/L (ref 46–116)
ALT SERPL W P-5'-P-CCNC: 26 U/L (ref 12–78)
ANION GAP SERPL CALCULATED.3IONS-SCNC: 5 MMOL/L (ref 4–13)
AST SERPL W P-5'-P-CCNC: 16 U/L (ref 5–45)
BACTERIA UR QL AUTO: ABNORMAL /HPF
BASOPHILS # BLD AUTO: 0.09 THOUSANDS/ΜL (ref 0–0.1)
BASOPHILS NFR BLD AUTO: 1 % (ref 0–1)
BILIRUB SERPL-MCNC: 0.3 MG/DL (ref 0.2–1)
BILIRUB UR QL STRIP: NEGATIVE
BUN SERPL-MCNC: 8 MG/DL (ref 5–25)
CALCIUM SERPL-MCNC: 8.1 MG/DL (ref 8.3–10.1)
CHLORIDE SERPL-SCNC: 106 MMOL/L (ref 100–108)
CLARITY UR: CLEAR
CO2 SERPL-SCNC: 31 MMOL/L (ref 21–32)
COLOR UR: COLORLESS
CREAT SERPL-MCNC: 0.6 MG/DL (ref 0.6–1.3)
EOSINOPHIL # BLD AUTO: 0.24 THOUSAND/ΜL (ref 0–0.61)
EOSINOPHIL NFR BLD AUTO: 3 % (ref 0–6)
ERYTHROCYTE [DISTWIDTH] IN BLOOD BY AUTOMATED COUNT: 13.5 % (ref 11.6–15.1)
EXT PREG TEST URINE: NEGATIVE
GFR SERPL CREATININE-BSD FRML MDRD: 110 ML/MIN/1.73SQ M
GLUCOSE SERPL-MCNC: 80 MG/DL (ref 65–140)
GLUCOSE UR STRIP-MCNC: NEGATIVE MG/DL
HCT VFR BLD AUTO: 38.8 % (ref 34.8–46.1)
HGB BLD-MCNC: 13.2 G/DL (ref 11.5–15.4)
HGB UR QL STRIP.AUTO: NORMAL
HOLD SPECIMEN: NORMAL
IMM GRANULOCYTES # BLD AUTO: 0.02 THOUSAND/UL (ref 0–0.2)
IMM GRANULOCYTES NFR BLD AUTO: 0 % (ref 0–2)
KETONES UR STRIP-MCNC: NEGATIVE MG/DL
LACTATE SERPL-SCNC: 1.1 MMOL/L (ref 0.5–2)
LEUKOCYTE ESTERASE UR QL STRIP: NEGATIVE
LYMPHOCYTES # BLD AUTO: 2.68 THOUSANDS/ΜL (ref 0.6–4.47)
LYMPHOCYTES NFR BLD AUTO: 28 % (ref 14–44)
MCH RBC QN AUTO: 32.2 PG (ref 26.8–34.3)
MCHC RBC AUTO-ENTMCNC: 34 G/DL (ref 31.4–37.4)
MCV RBC AUTO: 95 FL (ref 82–98)
MONOCYTES # BLD AUTO: 0.91 THOUSAND/ΜL (ref 0.17–1.22)
MONOCYTES NFR BLD AUTO: 9 % (ref 4–12)
MUCOUS THREADS UR QL AUTO: ABNORMAL
NEUTROPHILS # BLD AUTO: 5.7 THOUSANDS/ΜL (ref 1.85–7.62)
NEUTS SEG NFR BLD AUTO: 59 % (ref 43–75)
NITRITE UR QL STRIP: NEGATIVE
NON-SQ EPI CELLS URNS QL MICRO: ABNORMAL /HPF
NRBC BLD AUTO-RTO: 0 /100 WBCS
PH UR STRIP.AUTO: 5.5 [PH] (ref 4.5–8)
PLATELET # BLD AUTO: 352 THOUSANDS/UL (ref 149–390)
PMV BLD AUTO: 9.2 FL (ref 8.9–12.7)
POTASSIUM SERPL-SCNC: 3.6 MMOL/L (ref 3.5–5.3)
PROT SERPL-MCNC: 6.8 G/DL (ref 6.4–8.2)
PROT UR STRIP-MCNC: NEGATIVE MG/DL
RBC # BLD AUTO: 4.1 MILLION/UL (ref 3.81–5.12)
RBC #/AREA URNS AUTO: ABNORMAL /HPF
SODIUM SERPL-SCNC: 142 MMOL/L (ref 136–145)
SP GR UR STRIP.AUTO: <=1.005 (ref 1–1.03)
UROBILINOGEN UR QL STRIP.AUTO: 0.2 E.U./DL
WBC # BLD AUTO: 9.64 THOUSAND/UL (ref 4.31–10.16)
WBC #/AREA URNS AUTO: ABNORMAL /HPF

## 2018-08-28 PROCEDURE — 80053 COMPREHEN METABOLIC PANEL: CPT | Performed by: PHYSICIAN ASSISTANT

## 2018-08-28 PROCEDURE — 96374 THER/PROPH/DIAG INJ IV PUSH: CPT

## 2018-08-28 PROCEDURE — 85025 COMPLETE CBC W/AUTO DIFF WBC: CPT | Performed by: PHYSICIAN ASSISTANT

## 2018-08-28 PROCEDURE — 99283 EMERGENCY DEPT VISIT LOW MDM: CPT

## 2018-08-28 PROCEDURE — 81001 URINALYSIS AUTO W/SCOPE: CPT | Performed by: PHYSICIAN ASSISTANT

## 2018-08-28 PROCEDURE — 87040 BLOOD CULTURE FOR BACTERIA: CPT | Performed by: PHYSICIAN ASSISTANT

## 2018-08-28 PROCEDURE — 81025 URINE PREGNANCY TEST: CPT | Performed by: PHYSICIAN ASSISTANT

## 2018-08-28 PROCEDURE — 96375 TX/PRO/DX INJ NEW DRUG ADDON: CPT

## 2018-08-28 PROCEDURE — 83605 ASSAY OF LACTIC ACID: CPT | Performed by: PHYSICIAN ASSISTANT

## 2018-08-28 RX ORDER — MONTELUKAST SODIUM 10 MG/1
10 TABLET ORAL 2 TIMES DAILY
Qty: 28 TABLET | Refills: 0 | Status: SHIPPED | OUTPATIENT
Start: 2018-08-28 | End: 2018-10-05

## 2018-08-28 RX ORDER — FENTANYL CITRATE 50 UG/ML
50 INJECTION, SOLUTION INTRAMUSCULAR; INTRAVENOUS ONCE
Status: COMPLETED | OUTPATIENT
Start: 2018-08-28 | End: 2018-08-28

## 2018-08-28 RX ORDER — TRAMADOL HYDROCHLORIDE 50 MG/1
50 TABLET ORAL EVERY 8 HOURS PRN
Qty: 12 TABLET | Refills: 0 | Status: SHIPPED | OUTPATIENT
Start: 2018-08-28 | End: 2018-09-05 | Stop reason: SDUPTHER

## 2018-08-28 RX ORDER — ACETAMINOPHEN 325 MG/1
650 TABLET ORAL EVERY 6 HOURS PRN
Qty: 30 TABLET | Refills: 0 | Status: SHIPPED | OUTPATIENT
Start: 2018-08-28 | End: 2018-09-05

## 2018-08-28 RX ORDER — DIPHENHYDRAMINE HYDROCHLORIDE 50 MG/ML
25 INJECTION INTRAMUSCULAR; INTRAVENOUS ONCE
Status: COMPLETED | OUTPATIENT
Start: 2018-08-28 | End: 2018-08-28

## 2018-08-28 RX ORDER — CLINDAMYCIN HYDROCHLORIDE 150 MG/1
300 CAPSULE ORAL EVERY 8 HOURS SCHEDULED
Qty: 42 CAPSULE | Refills: 0 | Status: SHIPPED | OUTPATIENT
Start: 2018-08-28 | End: 2018-09-05

## 2018-08-28 RX ADMIN — FENTANYL CITRATE 50 MCG: 50 INJECTION, SOLUTION INTRAMUSCULAR; INTRAVENOUS at 13:40

## 2018-08-28 RX ADMIN — DIPHENHYDRAMINE HYDROCHLORIDE 25 MG: 50 INJECTION INTRAMUSCULAR; INTRAVENOUS at 13:39

## 2018-08-28 NOTE — DISCHARGE INSTRUCTIONS
Cellulitis, Ambulatory Care   GENERAL INFORMATION:   Cellulitis  is a skin infection caused by bacteria  Common symptoms include the following:   · Fever    · A red, warm, swollen area on your skin    · Pain when the area is touched    · Bumps or blisters (abscess) that may drain pus    · Bumpy, raised skin that feels like an orange peel  Seek immediate care for the following symptoms:   · An increase in pain, redness, warmth, and size    · Red streaks coming from the infected area    · A thin, gray-brown discharge coming from your infected skin area    · A crackling under your skin when you touch it    · Purple dots or bumps on your skin, or bleeding under your skin    · New swelling and pain in your legs    · Sudden trouble breathing or chest pain  Treatment for cellulitis  may include medicines to treat the bacterial infection or decrease pain  The infection may need to be cleaned out  Damaged, dead, or infected tissue may need to be cut away to help your wound heal   Manage your symptoms:   · Elevate your wound above the level of your heart  as often as you can  This will help decrease swelling and pain  Prop your wound on pillows or blankets to keep it elevated comfortably  · Clean your wound as directed  You may need to wash the wound with soap and water  Look for signs of infection  · Wear pressure stockings as directed  The stockings are tight and put pressure on your legs  This improves blood flow and decreases swelling  Prevent cellulitis:   · Wash your hands often  Use soap and water  Wash your hands after you use the bathroom, change a child's diapers, or sneeze  Wash your hands before you prepare or eat food  Use lotion to prevent dry, cracked skin  · Do not share personal items, such as towels, clothing, and razors  · Clean exercise equipment  with germ-killing  before and after you use it    Follow up with your healthcare provider as directed:  Write down your questions so you remember to ask them during your visits  CARE AGREEMENT:   You have the right to help plan your care  Learn about your health condition and how it may be treated  Discuss treatment options with your caregivers to decide what care you want to receive  You always have the right to refuse treatment  The above information is an  only  It is not intended as medical advice for individual conditions or treatments  Talk to your doctor, nurse or pharmacist before following any medical regimen to see if it is safe and effective for you  © 2014 [address] is for End User's use only and may not be sold, redistributed or otherwise used for commercial purposes  All illustrations and images included in CareNotes® are the copyrighted property of [employer]  or [employer] Alber[employer] Conner
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
## 2018-09-02 LAB — BACTERIA BLD CULT: NORMAL

## 2018-09-05 ENCOUNTER — OFFICE VISIT (OUTPATIENT)
Dept: FAMILY MEDICINE CLINIC | Facility: CLINIC | Age: 43
End: 2018-09-05
Payer: COMMERCIAL

## 2018-09-05 VITALS
DIASTOLIC BLOOD PRESSURE: 70 MMHG | RESPIRATION RATE: 14 BRPM | HEIGHT: 65 IN | HEART RATE: 89 BPM | OXYGEN SATURATION: 98 % | WEIGHT: 148 LBS | BODY MASS INDEX: 24.66 KG/M2 | SYSTOLIC BLOOD PRESSURE: 104 MMHG | TEMPERATURE: 98.7 F

## 2018-09-05 DIAGNOSIS — L03.114 CELLULITIS OF LEFT UPPER ARM: ICD-10-CM

## 2018-09-05 DIAGNOSIS — E53.8 B12 DEFICIENCY: ICD-10-CM

## 2018-09-05 PROBLEM — M19.079 PRIMARY LOCALIZED OSTEOARTHROSIS OF ANKLE AND FOOT: Status: ACTIVE | Noted: 2018-06-04

## 2018-09-05 PROCEDURE — 96372 THER/PROPH/DIAG INJ SC/IM: CPT | Performed by: FAMILY MEDICINE

## 2018-09-05 PROCEDURE — 99213 OFFICE O/P EST LOW 20 MIN: CPT | Performed by: FAMILY MEDICINE

## 2018-09-05 RX ORDER — CYANOCOBALAMIN 1000 UG/ML
1000 INJECTION INTRAMUSCULAR; SUBCUTANEOUS
Status: DISCONTINUED | OUTPATIENT
Start: 2018-09-14 | End: 2018-10-05

## 2018-09-05 RX ORDER — TRAMADOL HYDROCHLORIDE 50 MG/1
50 TABLET ORAL EVERY 8 HOURS PRN
Qty: 15 TABLET | Refills: 0 | Status: SHIPPED | OUTPATIENT
Start: 2018-09-05 | End: 2018-09-15

## 2018-09-05 RX ORDER — CLINDAMYCIN HYDROCHLORIDE 150 MG/1
150 CAPSULE ORAL EVERY 8 HOURS SCHEDULED
Qty: 21 CAPSULE | Refills: 0 | Status: SHIPPED | OUTPATIENT
Start: 2018-09-05 | End: 2018-09-12

## 2018-09-05 RX ADMIN — CYANOCOBALAMIN 1000 MCG: 1000 INJECTION INTRAMUSCULAR; SUBCUTANEOUS at 15:17

## 2018-09-05 NOTE — PROGRESS NOTES
History and Physical  Estela Jones 37 y o  female MRN: [id number]      Assessment:   Cellulitis LUE-resolving  B12 deficiency      Plan: Will continue Clindamycin x 7 days  Ultrasound of lesion LUE  B12 injection today  Continue Tramadol as needed  RTC as scheduled    Chief Complaint   Patient presents with    enlarged lymph node    B12 Injection    Medication Refill        HPI:  Estela Jones is a 37 y o  female who presents for ER follow up after cellulitis  It improved a little but thinks she may need a 2nd course of antibiotics  Redness has dissipated but lump remains  She reports she was to have ultrasound in ER but never done  She is 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  She seems to be allergic to all foods  She did tell them at [facility] but nothing has been done  Historical Information   Past Medical History:   Diagnosis Date    Anxiety     Asthma     Chronic kidney disease     Deep vein thrombosis (HCC)     Disease of thyroid gland     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    Generalized anxiety disorder 8/26/2017    Heart murmur     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HCC)     Migraine     [facility] [facility]     NSTEMI  pt denies, documented in cardio note    Pancreatitis     sphinger of     RA [facility]heumatoid [facility]) [facility] [address] [facility]     Ulcerative colitis [facility] [address] )      Past Surgical History:   Procedure Laterality Date    APPENDECTOMY      CHOLECYSTECTOMY      EGD AND COLONOSCOPY N/A 9/12/2017    Procedure: EGD AND COLONOSCOPY;  Surgeon: Loraine Vela MD;  [facility]; Service: Gastroenterology    ERCP N/A 9/15/2017    Procedure: ENDOSCOPIC RETROGRADE CHOLANGIOPANCREATOGRAPHY (ERCP); Surgeon: Jennifer Yusuf MD;  [facility];   Service: Gastroenterology    HYSTERECTOMY      KNEE SURGERY Right     LAPAROSCOPIC ENDOMETRIOSIS FULGURATION      ORIF ANKLE FRACTURE Left     [place] KNEE SCOPE,MED/LAT MENISECTOMY Left 5/12/2017    Procedure: POSSIBLE MEDIAL MENISECTOMY; Surgeon: Nigel Vega MD;  [facility];  Service: Orthopedics    [place] KNEE [address] CART Left 5/12/2017    Procedure: KNEE ARTHROSCOPY EVALUATION, CHONDROPLASTY;  Surgeon: Nigel Vega MD;  [facility];  Service: Orthopedics    [place] LAP,DIAGNOSTIC ABDOMEN N/A 12/12/2017    Procedure: LAPAROSCOPY DIAGNOSTIC  WITH LYSIS OF ADHESIONS;  Surgeon: Emiliano Jerez DO;  Location:  MAIN OR;  Service: General     Social History   History   Alcohol Use No     History   Drug Use No     History   Smoking Status    Never Smoker   Smokeless Tobacco    Never Used     No family history on file      Meds/Allergies   Allergies   Allergen Reactions    Aspergillus Fumigatus Other (See Comments), Hives and Swelling    Elavil [Amitriptyline] Anaphylaxis    Imitrex [Sumatriptan] Anaphylaxis     Bradycardia, sob, back pressure, head pain,throat tightness    Corn Dextrin      Any corn based products    Corn Oil Hives    Ibuprofen Hives and Throat Swelling    Imuran [Azathioprine] Other (See Comments)     pancreatitis    Penicillins Hives and Throat Swelling    Penicillium Notatum     Sulfa Antibiotics Hives    Sulfate     Sulfites        Meds:    Current Outpatient Prescriptions:     cetirizine (ZyrTEC) 10 mg tablet, Take 10 mg by mouth daily, Disp: , Rfl:     cholecalciferol (VITAMIN D) 400 units/mL, Take 2 5 mL (1,000 Units total) by mouth daily, Disp: 1 Bottle, Rfl: 0    diphenhydrAMINE (BENADRYL) 25 mg tablet, Take 50 mg by mouth daily at bedtime as needed for itching  , Disp: , Rfl:     EPINEPHrine (EPIPEN 2-MARYSOL IJ), EpiPen  prn, Disp: , Rfl:     furosemide (LASIX) 20 mg tablet, Take 1 tablet (20 mg total) by mouth daily, Disp: 30 tablet, Rfl: 5    hydrOXYzine HCL (ATARAX) 25 mg tablet, , Disp: , Rfl:     inFLIXimab (REMICADE) 100 mg, Infuse into a venous catheter, Disp: , Rfl:     levalbuterol (XOPENEX HFA) 45 mcg/act inhaler, Inhale 2 puffs every 4 (four) hours as needed, Disp: , Rfl:   levothyroxine 75 mcg tablet, Take 1 tablet by mouth daily, Disp: 30 tablet, Rfl: 0    liothyronine (CYTOMEL) 5 mcg tablet, Take 5 mcg by mouth 2 (two) times a day  , Disp: , Rfl:     LORazepam (ATIVAN) 1 mg tablet, Take 1 tablet (1 mg total) by mouth every 6 (six) hours as needed for anxiety, Disp: 90 tablet, Rfl: 0    montelukast (SINGULAIR) 10 mg tablet, Take 10 mg by mouth 2 (two) times a day  , Disp: , Rfl:     montelukast (SINGULAIR) 10 mg tablet, Take 1 tablet (10 mg total) by mouth 2 (two) times a day for 14 days, Disp: 28 tablet, Rfl: 0    nystatin (MYCOSTATIN) 100,000 units/mL suspension, Apply 5 mL (500,000 Units total) to the mouth or throat 4 (four) times a day, Disp: 60 mL, Rfl: 0    omalizumab (XOLAIR) 150 mg, Inject 300 mg under the skin  , Disp: , Rfl:     omeprazole (PriLOSEC) 40 MG capsule, omeprazole 40 mg capsule,delayed release, Disp: , Rfl:     ondansetron (ZOFRAN) 4 mg tablet, Take 4 mg by mouth every 6 (six) hours as needed for nausea or vomiting, Disp: , Rfl:     pantoprazole (PROTONIX) 40 mg tablet, Take 40 mg by mouth daily, Disp: , Rfl:     Potassium Acetate POWD, by Does not apply route, Disp: , Rfl:     potassium bicarbonate (K-LYTE) 25 MEQ disintegrating tablet, Take 25 mEq by mouth 2 (two) times a day, Disp: , Rfl:     Probiotic Product (PROBIOTIC-10 PO), floistar, Disp: , Rfl:     promethazine (PHENERGAN) 25 mg tablet, Take 25 mg by mouth every 6 (six) hours as needed for nausea or vomiting, Disp: , Rfl:     ranitidine (ZANTAC) 150 MG capsule, Take 150 mg by mouth 2 (two) times a day, Disp: , Rfl:     temazepam (RESTORIL) 30 mg capsule, Take 1 capsule (30 mg total) by mouth daily at bedtime as needed for sleep, Disp: 30 capsule, Rfl: 2    traMADol (ULTRAM) 50 mg tablet, Take 1 tablet (50 mg total) by mouth every 8 (eight) hours as needed for moderate pain or severe pain for up to 10 days, Disp: 12 tablet, Rfl: 0    Current Facility-Administered Medications:    cyanocobalamin injection 1,000 mcg, 1,000 mcg, Intramuscular, Q30 Days, Brandi Ramirez PA-C, 1,000 mcg at 08/10/18 1007      REVIEW OF SYSTEMS  Review of Systems   Constitutional: Negative  HENT: Negative  Eyes: Negative  Respiratory: Negative  Cardiovascular: Negative  Gastrointestinal: Positive for diarrhea  Endocrine: Negative  Genitourinary: Negative  Allergic/Immunologic: Negative  Neurological: Negative  Hematological: Negative  Current Vitals:   Blood Pressure: 104/70 (09/05/18 1434)  Pulse: 89 (09/05/18 1434)  Temperature: 98 7 °F (37 1 °C) (09/05/18 1434)  Temp Source: Tympanic (09/05/18 1434)  Respirations: 14 (09/05/18 1434)  Height: 5' 5" (165 1 cm) (09/05/18 1434)  Weight - Scale: 67 1 kg (148 lb) (09/05/18 1434)  SpO2: 98 % (09/05/18 1434)  Body mass index is 24 63 kg/m²  PHYSICAL EXAMS:  Physical Exam   Constitutional: She appears well-developed and well-nourished  HENT:   Head: Normocephalic and atraumatic  Right Ear: External ear normal    Left Ear: External ear normal    Cardiovascular: Normal rate, regular rhythm and normal heart sounds  Pulmonary/Chest: Effort normal and breath sounds normal  She has no wheezes  She has no rales  Musculoskeletal: She exhibits no edema  Skin:   1 x 1 cm round mildly erythematous lesion noted on L upper extremity  Mild tenderness with palpation  Psychiatric: She has a normal mood and affect  Lab Results:          Brandi Ramirez PA-C  9/5/2018, 2:43 PM

## 2018-09-06 ENCOUNTER — TRANSCRIBE ORDERS (OUTPATIENT)
Dept: ADMINISTRATIVE | Facility: HOSPITAL | Age: 43
End: 2018-09-06

## 2018-09-07 DIAGNOSIS — R22.32 MASS OF LEFT UPPER EXTREMITY: Primary | ICD-10-CM

## 2018-09-11 ENCOUNTER — HOSPITAL ENCOUNTER (OUTPATIENT)
Dept: ULTRASOUND IMAGING | Facility: HOSPITAL | Age: 43
Discharge: HOME | End: 2018-09-11
Payer: COMMERCIAL

## 2018-09-11 DIAGNOSIS — R22.32 MASS OF LEFT UPPER EXTREMITY: ICD-10-CM

## 2018-09-11 PROCEDURE — 76882 US LMTD JT/FCL EVL NVASC XTR: CPT

## 2018-09-18 ENCOUNTER — OFFICE VISIT (OUTPATIENT)
Dept: SLEEP CENTER | Facility: CLINIC | Age: 43
End: 2018-09-18
Payer: COMMERCIAL

## 2018-09-18 VITALS
HEIGHT: 65 IN | DIASTOLIC BLOOD PRESSURE: 62 MMHG | SYSTOLIC BLOOD PRESSURE: 100 MMHG | WEIGHT: 142 LBS | BODY MASS INDEX: 23.66 KG/M2 | HEART RATE: 68 BPM | RESPIRATION RATE: 14 BRPM

## 2018-09-18 DIAGNOSIS — G47.01 INSOMNIA DUE TO MEDICAL CONDITION: ICD-10-CM

## 2018-09-18 PROCEDURE — 99214 OFFICE O/P EST MOD 30 MIN: CPT | Performed by: INTERNAL MEDICINE

## 2018-09-18 RX ORDER — LEVOTHYROXINE SODIUM 50 UG/1
TABLET ORAL
COMMUNITY
Start: 2018-08-28 | End: 2019-02-12 | Stop reason: DRUGHIGH

## 2018-09-18 RX ORDER — TEMAZEPAM 30 MG/1
30 CAPSULE ORAL
Qty: 90 CAPSULE | Refills: 1 | Status: SHIPPED | OUTPATIENT
Start: 2018-09-18 | End: 2018-11-15 | Stop reason: SDUPTHER

## 2018-09-18 NOTE — PROGRESS NOTES
Progress Note - Sleep Center   Veldon Moritz :1975 MRN: [id number]      Reason for Visit:    37 y  o female with chronic insomnia    Assessment:  The patient is doing extremely well on temazepam 30 mg by mouth at bedtime  The patient is using medication every night  I did advise her that she may be less likely to become tolerant if she uses it on a more sporadic basis, but if it is all that will get her to sleep, she may reasonably use it nightly  We also discussed the possible link between chronic use of benzodiazepines and dementia  In this case, benefits outweigh risks  Plan:  Continue temazepam 30 mg at bedtime    Follow up: One year    History of Present Illness: The patient has chronic insomnia which has been refractory to a number of medications  She also suffers from chronic anxiety, Crohn's disease and mast cell disease        Review of Systems      Genitourinary need to urinate more than twice a night   Cardiology palpitations/fluttering feeling in the chest and ankle/leg swelling   Gastrointestinal frequent heartburn/acid reflux and none   Neurology frequent headaches, awaken with headache, need to move extremities, muscle weakness, numbness/tingling of an extremity, forgetfulness, poor concentration or confusion, , difficulty with memory and balance problems   Constitutional fatigue and weight change   Integumentary rash or dry skin and itching   Psychiatry anxiety   Musculoskeletal joint pain, muscle aches, back pain and legs twitching/jerking   Pulmonary shortness of breath with activity   ENT ringing in ears   Endocrine frequent urination   Hematological none         Historical Information    Past Medical History:   Past Medical History:   Diagnosis Date    Anxiety     Asthma     Chronic kidney disease     Deep vein thrombosis (HCC)     Disease of thyroid gland     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    Generalized anxiety disorder 2017    Heart murmur  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     Migraine     Myocardial infarction [facility])     NSTEMI  pt denies, documented in cardio note    Pancreatitis     sphinger of     RA [facility]rheumatoid [facility]) [facility] [address] )     [facility]          Past Surgical History:   Past Surgical History:   Procedure Laterality Date    APPENDECTOMY      CHOLECYSTECTOMY      EGD AND COLONOSCOPY N/A 9/12/2017    Procedure: EGD AND COLONOSCOPY;  Surgeon: Jerrica Altman MD;  [facility]; Service: Gastroenterology    ERCP N/A 9/15/2017    Procedure: ENDOSCOPIC RETROGRADE CHOLANGIOPANCREATOGRAPHY (ERCP); Surgeon: Danny Collado MD;  [facility]; Service: Gastroenterology    HYSTERECTOMY      KNEE SURGERY Right     LAPAROSCOPIC ENDOMETRIOSIS FULGURATION      ORIF ANKLE FRACTURE Left     [place] KNEE SCOPE,MED/LAT MENISECTOMY Left 5/12/2017    Procedure: POSSIBLE MEDIAL MENISECTOMY;  Surgeon: Naga Barron MD;  [facility];  Service: Orthopedics    [place] [address] CART Left 5/12/2017    Procedure: KNEE ARTHROSCOPY EVALUATION, CHONDROPLASTY;  Surgeon: Naga Barron MD;  [facility];  Service: Orthopedics    [place] LAP,DIAGNOSTIC ABDOMEN N/A 12/12/2017    Procedure: LAPAROSCOPY DIAGNOSTIC  WITH LYSIS OF ADHESIONS;  Surgeon: Rosalba Cleaning DO;  [facility];  Service: General         Social History - see chart  History   Alcohol use: Not on file     History   Drug Use Not on file     History   Smoking Status    Not on file   Smokeless Tobacco    Not on file     Family History: History reviewed  No pertinent family history      Medications/Allergies:      Current Outpatient Prescriptions:     cetirizine (ZyrTEC) 10 mg tablet, Take 10 mg by mouth daily, Disp: , Rfl:     cholecalciferol (VITAMIN D) 400 units/mL, Take 2 5 mL (1,000 Units total) by mouth daily, Disp: 1 Bottle, Rfl: 0    diphenhydrAMINE (BENADRYL) 25 mg tablet, Take 50 mg by mouth daily at bedtime as needed for itching  , Disp: , Rfl:     EPINEPHrine (EPIPEN 2-MARYSOL IJ), EpiPen  prn, Disp: , Rfl:     furosemide (LASIX) 20 mg tablet, Take 1 tablet (20 mg total) by mouth daily, Disp: 30 tablet, Rfl: 5    hydrOXYzine HCL (ATARAX) 25 mg tablet, , Disp: , Rfl:     levalbuterol (XOPENEX HFA) 45 mcg/act inhaler, Inhale 2 puffs every 4 (four) hours as needed, Disp: , Rfl:     levothyroxine 75 mcg tablet, Take 1 tablet by mouth daily, Disp: 30 tablet, Rfl: 0    LEVOXYL 50 MCG tablet, , Disp: , Rfl:     liothyronine (CYTOMEL) 5 mcg tablet, Take 5 mcg by mouth 2 (two) times a day  , Disp: , Rfl:     LORazepam (ATIVAN) 1 mg tablet, Take 1 tablet (1 mg total) by mouth every 6 (six) hours as needed for anxiety, Disp: 90 tablet, Rfl: 0    montelukast (SINGULAIR) 10 mg tablet, Take 10 mg by mouth 2 (two) times a day  , Disp: , Rfl:     nystatin (MYCOSTATIN) 100,000 units/mL suspension, Apply 5 mL (500,000 Units total) to the mouth or throat 4 (four) times a day, Disp: 60 mL, Rfl: 0    omalizumab (XOLAIR) 150 mg, Inject 300 mg under the skin  , Disp: , Rfl:     omeprazole (PriLOSEC) 40 MG capsule, omeprazole 40 mg capsule,delayed release, Disp: , Rfl:     ondansetron (ZOFRAN) 4 mg tablet, Take 4 mg by mouth every 6 (six) hours as needed for nausea or vomiting, Disp: , Rfl:     pantoprazole (PROTONIX) 40 mg tablet, Take 40 mg by mouth daily, Disp: , Rfl:     Potassium Acetate POWD, by Does not apply route, Disp: , Rfl:     potassium bicarbonate (K-LYTE) 25 MEQ disintegrating tablet, Take 25 mEq by mouth 2 (two) times a day, Disp: , Rfl:     Probiotic Product (PROBIOTIC-10 PO), floistar, Disp: , Rfl:     promethazine (PHENERGAN) 25 mg tablet, Take 25 mg by mouth every 6 (six) hours as needed for nausea or vomiting, Disp: , Rfl:     ranitidine (ZANTAC) 150 MG capsule, Take 150 mg by mouth 2 (two) times a day, Disp: , Rfl:     temazepam (RESTORIL) 30 mg capsule, Take 1 capsule (30 mg total) by mouth daily at bedtime as needed for sleep, Disp: 90 capsule, Rfl: 1    inFLIXimab (REMICADE) 100 mg, Infuse into a venous catheter, Disp: , Rfl:     montelukast (SINGULAIR) 10 mg tablet, Take 1 tablet (10 mg total) by mouth 2 (two) times a day for 14 days, Disp: 28 tablet, Rfl: 0    Current Facility-Administered Medications:     cyanocobalamin injection 1,000 mcg, 1,000 mcg, Intramuscular, Q30 Days, Meghan Vidal PA-C, 1,000 mcg at 09/05/18 1517      Objective    Vital Signs:   Vitals:    09/18/18 1300   BP: 100/62   BP Location: Left arm   Cuff Size: Large   Pulse: 68   Resp: 14   Weight: 64 4 kg (142 lb)   Height: 5' 5 25" (1 657 m)     [place] Sleepiness Scale: Total score: 0    Physical Exam:    General: Alert, appropriate, cooperative, normal build    Head: NC/AT    Skin: Warm, dry    Neuro: No motor abnormalities, cranial nerves appear intact    Psych: Normal affect      Counseling / Coordination of Care  Total clinic time spent today 15 minutes  Greater than 50% of total time was spent with the patient and / or family counseling and / or coordination of care  A description of the counseling / coordination of care: treatment was discussed    AMIRA Garrido    Board Certified Sleep Specialist

## 2018-10-05 ENCOUNTER — OFFICE VISIT (OUTPATIENT)
Dept: FAMILY MEDICINE CLINIC | Facility: CLINIC | Age: 43
End: 2018-10-05
Payer: COMMERCIAL

## 2018-10-05 DIAGNOSIS — K51.919 ULCERATIVE COLITIS WITH COMPLICATION, UNSPECIFIED LOCATION (HCC): ICD-10-CM

## 2018-10-05 DIAGNOSIS — D47.09 MAST CELL DISEASE: Primary | ICD-10-CM

## 2018-10-05 PROCEDURE — 99213 OFFICE O/P EST LOW 20 MIN: CPT | Performed by: FAMILY MEDICINE

## 2018-10-05 PROCEDURE — 96372 THER/PROPH/DIAG INJ SC/IM: CPT | Performed by: FAMILY MEDICINE

## 2018-10-05 RX ORDER — CYANOCOBALAMIN 1000 UG/ML
1000 INJECTION INTRAMUSCULAR; SUBCUTANEOUS
Status: DISCONTINUED | OUTPATIENT
Start: 2018-10-05 | End: 2018-10-31

## 2018-10-05 RX ORDER — BUDESONIDE 3 MG/1
3 CAPSULE, COATED PELLETS ORAL 3 TIMES DAILY
Qty: 270 CAPSULE | Refills: 0 | Status: SHIPPED | OUTPATIENT
Start: 2018-10-05 | End: 2018-11-24 | Stop reason: SDUPTHER

## 2018-10-05 RX ORDER — BUDESONIDE 3 MG/1
3 CAPSULE, COATED PELLETS ORAL 3 TIMES DAILY
Qty: 90 CAPSULE | Refills: 5 | Status: SHIPPED | OUTPATIENT
Start: 2018-10-05 | End: 2018-10-05 | Stop reason: SDUPTHER

## 2018-10-05 RX ADMIN — CYANOCOBALAMIN 1000 MCG: 1000 INJECTION INTRAMUSCULAR; SUBCUTANEOUS at 14:44

## 2018-10-05 NOTE — PROGRESS NOTES
History and Physical  Jozef Cantu 37 y o  female MRN: [id number]      Assessment:   UC  Hypothyroidism  INDERJIT    Plan: Will reorder Budesonide  Continues as per GI  B12 injection today  RTC 4 months      Chief Complaint   Patient presents with    Follow-up        HPI:  Jozef Cantu is a 37 y o  female who presents for above  She was again recently at [facility] for mast cell issues and is on Remicade for UC  Dx with eosinophilia  Reports she was recently put on budesonide for this and needs refill  Next treatment is 10/16/18 at [facility]  Her diet has been changed during the hospitalization  She is doing much better  Historical Information   Past Medical History:   Diagnosis Date    Anxiety     Asthma     Chronic kidney disease     Deep vein thrombosis (HCC)     Disease of thyroid gland     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    Generalized anxiety disorder 8/26/2017    Heart murmur     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HCC)     Migraine     [facility] [address] )     NSTEMI  pt denies, documented in cardio note    Pancreatitis     sphinger of     RA [facility]rheumatoid arthritis) ([facility] [address] )     Ulcerative colitis ([facility] [address] )      Past Surgical History:   Procedure Laterality Date    APPENDECTOMY      CHOLECYSTECTOMY      EGD AND COLONOSCOPY N/A 9/12/2017    Procedure: EGD AND COLONOSCOPY;  Surgeon: Nils Moritz, MD;  [facility]; Service: Gastroenterology    ERCP N/A 9/15/2017    Procedure: ENDOSCOPIC RETROGRADE CHOLANGIOPANCREATOGRAPHY (ERCP); Surgeon: Tereso Leonardo MD;  Location:  GI LAB;   Service: Gastroenterology    HYSTERECTOMY      KNEE SURGERY Right     LAPAROSCOPIC ENDOMETRIOSIS FULGURATION      ORIF ANKLE FRACTURE Left     [place] KNEE SCOPE,MED/LAT MENISECTOMY Left 5/12/2017    Procedure: POSSIBLE MEDIAL MENISECTOMY;  Surgeon: Mallika Cadet MD;  [facility];  Service: Orthopedics    [place] [address] [facility] Left 5/12/2017    Procedure: KNEE ARTHROSCOPY EVALUATION, CHONDROPLASTY;  Surgeon: Eva Hobson MD;  [facility];  Service: Orthopedics    [place] LAP,DIAGNOSTIC ABDOMEN N/A 12/12/2017    Procedure: LAPAROSCOPY DIAGNOSTIC  WITH LYSIS OF ADHESIONS;  Surgeon: Abran Snell DO;  Location:  MAIN OR;  Service: General     Social History   History   Alcohol Use No     History   Drug Use No     History   Smoking Status    Never Smoker   Smokeless Tobacco    Never Used     No family history on file      Meds/Allergies   Allergies   Allergen Reactions    Aspergillus Fumigatus Other (See Comments), Hives and Swelling    Elavil [Amitriptyline] Anaphylaxis    Imitrex [Sumatriptan] Anaphylaxis     Bradycardia, sob, back pressure, head pain,throat tightness    Corn Dextrin      Any corn based products    Corn Oil Hives    Ibuprofen Hives and Throat Swelling    Imuran [Azathioprine] Other (See Comments)     pancreatitis    Penicillins Hives and Throat Swelling    Penicillium Notatum     Sulfa Antibiotics Hives    Sulfate     Sulfites        Meds:    Current Outpatient Prescriptions:     cetirizine (ZyrTEC) 10 mg tablet, Take 10 mg by mouth daily, Disp: , Rfl:     cholecalciferol (VITAMIN D) 400 units/mL, Take 2 5 mL (1,000 Units total) by mouth daily, Disp: 1 Bottle, Rfl: 0    diphenhydrAMINE (BENADRYL) 25 mg tablet, Take 50 mg by mouth daily at bedtime as needed for itching  , Disp: , Rfl:     EPINEPHrine (EPIPEN 2-MARYSOL IJ), EpiPen  prn, Disp: , Rfl:     furosemide (LASIX) 20 mg tablet, Take 1 tablet (20 mg total) by mouth daily, Disp: 30 tablet, Rfl: 5    hydrOXYzine HCL (ATARAX) 25 mg tablet, , Disp: , Rfl:     levalbuterol (XOPENEX HFA) 45 mcg/act inhaler, Inhale 2 puffs every 4 (four) hours as needed, Disp: , Rfl:     LEVOXYL 50 MCG tablet, , Disp: , Rfl:     liothyronine (CYTOMEL) 5 mcg tablet, Take 5 mcg by mouth 2 (two) times a day  , Disp: , Rfl:     LORazepam (ATIVAN) 1 mg tablet, Take 1 tablet (1 mg total) by mouth every 6 (six) hours as needed for anxiety, Disp: 90 tablet, Rfl: 0    montelukast (SINGULAIR) 10 mg tablet, Take 10 mg by mouth 2 (two) times a day  , Disp: , Rfl:     omalizumab (XOLAIR) 150 mg, Inject 300 mg under the skin  , Disp: , Rfl:     pantoprazole (PROTONIX) 40 mg tablet, Take 40 mg by mouth daily, Disp: , Rfl:     potassium bicarbonate (K-LYTE) 25 MEQ disintegrating tablet, Take 25 mEq by mouth 2 (two) times a day, Disp: , Rfl:     Probiotic Product (PROBIOTIC-10 PO), floistar, Disp: , Rfl:     promethazine (PHENERGAN) 25 mg tablet, Take 25 mg by mouth every 6 (six) hours as needed for nausea or vomiting, Disp: , Rfl:     ranitidine (ZANTAC) 150 MG capsule, Take 150 mg by mouth 2 (two) times a day, Disp: , Rfl:     temazepam (RESTORIL) 30 mg capsule, Take 1 capsule (30 mg total) by mouth daily at bedtime as needed for sleep, Disp: 90 capsule, Rfl: 1    inFLIXimab (REMICADE) 100 mg, Infuse into a venous catheter, Disp: , Rfl:     Current Facility-Administered Medications:     cyanocobalamin injection 1,000 mcg, 1,000 mcg, Intramuscular, Q30 Days, Manav Weinberg PA-C, 1,000 mcg at 09/05/18 1517      REVIEW OF SYSTEMS  Review of Systems   Constitutional: Negative  HENT: Negative  Eyes: Negative  Respiratory: Negative  Cardiovascular: Negative  Gastrointestinal:        As per HPI   Endocrine: Negative  Genitourinary: Negative  Musculoskeletal: Negative  Allergic/Immunologic: Negative  Neurological: Negative  Hematological: Negative  Psychiatric/Behavioral: Negative  Current Vitals: There is no height or weight on file to calculate BMI  PHYSICAL EXAMS:  Physical Exam   Constitutional: She is oriented to person, place, and time  She appears well-developed and well-nourished  HENT:   Head: Normocephalic and atraumatic  Right Ear: External ear normal    Left Ear: External ear normal    Eyes: Pupils are equal, round, and reactive to light  Neck: Normal range of motion   Neck supple  No thyromegaly present  Cardiovascular: Normal rate, regular rhythm and normal heart sounds  Pulmonary/Chest: Effort normal and breath sounds normal  She has no wheezes  She has no rales  Neurological: She is alert and oriented to person, place, and time  Skin:   No palpable mass in L axillary area   Psychiatric: She has a normal mood and affect  Lab Results:          Zac Razo PA-C  10/5/2018, 1:57 PM

## 2018-10-22 ENCOUNTER — TELEPHONE (OUTPATIENT)
Dept: FAMILY MEDICINE CLINIC | Facility: CLINIC | Age: 43
End: 2018-10-22

## 2018-10-22 NOTE — TELEPHONE ENCOUNTER
CALL CAME FROM [facility]   CASE BROOKE LEON  SAID IT IS THEIR POLICY TO CONTACT THE PCP OFFICE WHEN THEY CANT MAKE CONTACT WITH THE PATIENT     MANAGERS PHONE NUMBER IS  [phone]

## 2018-10-31 ENCOUNTER — CLINICAL SUPPORT (OUTPATIENT)
Dept: FAMILY MEDICINE CLINIC | Facility: CLINIC | Age: 43
End: 2018-10-31
Payer: COMMERCIAL

## 2018-10-31 DIAGNOSIS — E53.8 B12 DEFICIENCY: Primary | ICD-10-CM

## 2018-10-31 PROCEDURE — 96372 THER/PROPH/DIAG INJ SC/IM: CPT | Performed by: FAMILY MEDICINE

## 2018-10-31 RX ORDER — CYANOCOBALAMIN 1000 UG/ML
1000 INJECTION INTRAMUSCULAR; SUBCUTANEOUS
Status: DISCONTINUED | OUTPATIENT
Start: 2018-10-31 | End: 2019-01-08

## 2018-11-01 RX ADMIN — CYANOCOBALAMIN 1000 MCG: 1000 INJECTION INTRAMUSCULAR; SUBCUTANEOUS at 13:58

## 2018-11-14 DIAGNOSIS — G47.01 INSOMNIA DUE TO MEDICAL CONDITION: ICD-10-CM

## 2018-11-14 NOTE — TELEPHONE ENCOUNTER
Patient called for refill on her temazepam (RESTORIL) 30 mg capsule   Returned her call to clarify information on request

## 2018-11-15 RX ORDER — TEMAZEPAM 30 MG/1
30 CAPSULE ORAL
Qty: 90 CAPSULE | Refills: 0 | Status: CANCELLED | OUTPATIENT
Start: 2018-11-15

## 2018-11-15 RX ORDER — TEMAZEPAM 30 MG/1
30 CAPSULE ORAL
Qty: 90 CAPSULE | Refills: 0 | Status: SHIPPED | OUTPATIENT
Start: 2018-11-15 | End: 2019-02-12 | Stop reason: ALTCHOICE

## 2018-11-15 NOTE — TELEPHONE ENCOUNTER
Patient called back   Does not have printed script from September office visit for temazepam  Also confirmed with [facility] pharmacy there are no refills pending

## 2018-11-24 DIAGNOSIS — K51.919 ULCERATIVE COLITIS WITH COMPLICATION, UNSPECIFIED LOCATION (HCC): ICD-10-CM

## 2018-11-26 RX ORDER — BUDESONIDE 3 MG/1
CAPSULE, COATED PELLETS ORAL
Qty: 100 CAPSULE | Refills: 0 | Status: SHIPPED | OUTPATIENT
Start: 2018-11-26 | End: 2019-04-16

## 2018-11-30 ENCOUNTER — CLINICAL SUPPORT (OUTPATIENT)
Dept: FAMILY MEDICINE CLINIC | Facility: CLINIC | Age: 43
End: 2018-11-30

## 2018-11-30 DIAGNOSIS — Z29.8 ENCOUNTER FOR VITAMIN B12 INJECTION WHILE ON ALIMTA CHEMOTHERAPY: Primary | ICD-10-CM

## 2018-11-30 RX ADMIN — CYANOCOBALAMIN 1000 MCG: 1000 INJECTION INTRAMUSCULAR; SUBCUTANEOUS at 14:10

## 2019-01-07 ENCOUNTER — CLINICAL SUPPORT (OUTPATIENT)
Dept: FAMILY MEDICINE CLINIC | Facility: CLINIC | Age: 44
End: 2019-01-07
Payer: COMMERCIAL

## 2019-01-07 DIAGNOSIS — E53.8 VITAMIN B 12 DEFICIENCY: Primary | ICD-10-CM

## 2019-01-07 PROCEDURE — 96372 THER/PROPH/DIAG INJ SC/IM: CPT | Performed by: FAMILY MEDICINE

## 2019-01-08 DIAGNOSIS — E53.8 B12 DEFICIENCY: Primary | ICD-10-CM

## 2019-01-08 RX ORDER — CYANOCOBALAMIN 1000 UG/ML
1000 INJECTION INTRAMUSCULAR; SUBCUTANEOUS
Status: DISCONTINUED | OUTPATIENT
Start: 2019-01-08 | End: 2019-09-17 | Stop reason: CLARIF

## 2019-01-11 RX ADMIN — CYANOCOBALAMIN 1000 MCG: 1000 INJECTION INTRAMUSCULAR; SUBCUTANEOUS at 11:48

## 2019-01-17 ENCOUNTER — TELEPHONE (OUTPATIENT)
Dept: FAMILY MEDICINE CLINIC | Facility: CLINIC | Age: 44
End: 2019-01-17

## 2019-01-17 NOTE — TELEPHONE ENCOUNTER
Spoke with patient, she agreed to be seen by our residency  At time of visit she will discuss her concerns/ PET SCAN  Patient will call back for an appointment

## 2019-01-28 ENCOUNTER — TELEPHONE (OUTPATIENT)
Dept: FAMILY MEDICINE CLINIC | Facility: CLINIC | Age: 44
End: 2019-01-28

## 2019-01-28 NOTE — TELEPHONE ENCOUNTER
----- Message from Shruti Berry sent at 1/28/2019  7:10 AM EST -----  Regarding: Test Results Question  Contact: [phone]  I can no longer bare the pain in my left armpit that radiates into my left breast, or the pain in my right shoulder that shoots down they my right breast  I can no longer raise my arms to shoulder height nor can I lift anything in my hands as the pain to the breasts worsens  Please order the cat scan and once we have those results we can decide on the Pet scan  In addition I know for certain I have 1 abdominal hernia and a hiatel hernia in my mid and lower abdomen but I believe I have a second hernia  When I am constipated there are 2 bulging areas on my abdomen close to the belly buttom  I have an enlarged lymph nodes in my left groin and enlarged/infected lymph nodes in my left arm pit    Lymphoma runs on both sides of my family  Both my dads sister and moms sitter had breast cancer, both started with pain and enlarged lymph nodes in the armpit and shoulder  Please schedule the CT, with/without contract as we previously discussed and we will see what that shows and then decide on the Pet Scan as I know understand why they cannot be done together as previously requested    Thank you,  Diann Conway

## 2019-02-12 ENCOUNTER — HOSPITAL ENCOUNTER (INPATIENT)
Facility: HOSPITAL | Age: 44
LOS: 1 days | Discharge: HOME | DRG: 309 | End: 2019-02-14
Attending: EMERGENCY MEDICINE | Admitting: FAMILY MEDICINE
Payer: COMMERCIAL

## 2019-02-12 ENCOUNTER — APPOINTMENT (EMERGENCY)
Dept: CT IMAGING | Facility: HOSPITAL | Age: 44
DRG: 309 | End: 2019-02-12
Payer: COMMERCIAL

## 2019-02-12 ENCOUNTER — APPOINTMENT (OUTPATIENT)
Dept: NON INVASIVE DIAGNOSTICS | Facility: HOSPITAL | Age: 44
DRG: 309 | End: 2019-02-12
Payer: COMMERCIAL

## 2019-02-12 DIAGNOSIS — R07.9 CHEST PAIN: Primary | ICD-10-CM

## 2019-02-12 DIAGNOSIS — R11.2 INTRACTABLE NAUSEA AND VOMITING: ICD-10-CM

## 2019-02-12 DIAGNOSIS — R00.2 PALPITATION: ICD-10-CM

## 2019-02-12 DIAGNOSIS — R00.2 PALPITATIONS: ICD-10-CM

## 2019-02-12 DIAGNOSIS — R52 INTRACTABLE PAIN: ICD-10-CM

## 2019-02-12 DIAGNOSIS — R65.10 SIRS (SYSTEMIC INFLAMMATORY RESPONSE SYNDROME) (HCC): ICD-10-CM

## 2019-02-12 PROBLEM — B37.0 THRUSH: Status: ACTIVE | Noted: 2019-02-12

## 2019-02-12 PROBLEM — G89.29 CHRONIC BACK PAIN: Status: ACTIVE | Noted: 2019-02-12

## 2019-02-12 PROBLEM — M54.9 CHRONIC BACK PAIN: Status: ACTIVE | Noted: 2019-02-12

## 2019-02-12 LAB
ALBUMIN SERPL BCP-MCNC: 3.5 G/DL (ref 3.5–5)
ALP SERPL-CCNC: 51 U/L (ref 46–116)
ALT SERPL W P-5'-P-CCNC: 29 U/L (ref 12–78)
ANION GAP SERPL CALCULATED.3IONS-SCNC: 11 MMOL/L (ref 4–13)
APTT PPP: 26 SECONDS (ref 26–38)
AST SERPL W P-5'-P-CCNC: 36 U/L (ref 5–45)
ATRIAL RATE: 96 BPM
BASOPHILS # BLD AUTO: 0.07 THOUSANDS/ΜL (ref 0–0.1)
BASOPHILS NFR BLD AUTO: 0 % (ref 0–1)
BILIRUB SERPL-MCNC: 0.6 MG/DL (ref 0.2–1)
BILIRUB UR QL STRIP: NEGATIVE
BUN SERPL-MCNC: 13 MG/DL (ref 5–25)
CALCIUM SERPL-MCNC: 8.6 MG/DL (ref 8.3–10.1)
CHLORIDE SERPL-SCNC: 101 MMOL/L (ref 100–108)
CLARITY UR: CLEAR
CO2 SERPL-SCNC: 26 MMOL/L (ref 21–32)
COLOR UR: YELLOW
CREAT SERPL-MCNC: 0.78 MG/DL (ref 0.6–1.3)
EOSINOPHIL # BLD AUTO: 0.19 THOUSAND/ΜL (ref 0–0.61)
EOSINOPHIL NFR BLD AUTO: 1 % (ref 0–6)
ERYTHROCYTE [DISTWIDTH] IN BLOOD BY AUTOMATED COUNT: 13.4 % (ref 11.6–15.1)
EST. AVERAGE GLUCOSE BLD GHB EST-MCNC: 103 MG/DL
GFR SERPL CREATININE-BSD FRML MDRD: 92 ML/MIN/1.73SQ M
GLUCOSE SERPL-MCNC: 87 MG/DL (ref 65–140)
GLUCOSE UR STRIP-MCNC: NEGATIVE MG/DL
HBA1C MFR BLD: 5.2 % (ref 4.2–6.3)
HCT VFR BLD AUTO: 44.9 % (ref 34.8–46.1)
HGB BLD-MCNC: 15 G/DL (ref 11.5–15.4)
HGB UR QL STRIP.AUTO: NEGATIVE
IMM GRANULOCYTES # BLD AUTO: 0.07 THOUSAND/UL (ref 0–0.2)
IMM GRANULOCYTES NFR BLD AUTO: 0 % (ref 0–2)
INR PPP: 1 (ref 0.86–1.17)
KETONES UR STRIP-MCNC: NEGATIVE MG/DL
LACTATE SERPL-SCNC: 1.2 MMOL/L (ref 0.5–2)
LACTATE SERPL-SCNC: 2.3 MMOL/L (ref 0.5–2)
LACTATE SERPL-SCNC: 2.3 MMOL/L (ref 0.5–2)
LEUKOCYTE ESTERASE UR QL STRIP: NEGATIVE
LIPASE SERPL-CCNC: 145 U/L (ref 73–393)
LYMPHOCYTES # BLD AUTO: 2.35 THOUSANDS/ΜL (ref 0.6–4.47)
LYMPHOCYTES NFR BLD AUTO: 14 % (ref 14–44)
MAGNESIUM SERPL-MCNC: 1.8 MG/DL (ref 1.6–2.6)
MCH RBC QN AUTO: 32.6 PG (ref 26.8–34.3)
MCHC RBC AUTO-ENTMCNC: 33.4 G/DL (ref 31.4–37.4)
MCV RBC AUTO: 98 FL (ref 82–98)
MONOCYTES # BLD AUTO: 0.61 THOUSAND/ΜL (ref 0.17–1.22)
MONOCYTES NFR BLD AUTO: 4 % (ref 4–12)
NEUTROPHILS # BLD AUTO: 13.88 THOUSANDS/ΜL (ref 1.85–7.62)
NEUTS SEG NFR BLD AUTO: 81 % (ref 43–75)
NITRITE UR QL STRIP: NEGATIVE
NRBC BLD AUTO-RTO: 0 /100 WBCS
P AXIS: 45 DEGREES
PH UR STRIP.AUTO: 7 [PH] (ref 4.5–8)
PLATELET # BLD AUTO: 367 THOUSANDS/UL (ref 149–390)
PLATELET # BLD AUTO: 395 THOUSANDS/UL (ref 149–390)
PMV BLD AUTO: 8.5 FL (ref 8.9–12.7)
PMV BLD AUTO: 8.6 FL (ref 8.9–12.7)
POTASSIUM SERPL-SCNC: 4.3 MMOL/L (ref 3.5–5.3)
PR INTERVAL: 110 MS
PROCALCITONIN SERPL-MCNC: 0.38 NG/ML
PROT SERPL-MCNC: 7.3 G/DL (ref 6.4–8.2)
PROT UR STRIP-MCNC: NEGATIVE MG/DL
PROTHROMBIN TIME: 12.7 SECONDS (ref 11.8–14.2)
QRS AXIS: 76 DEGREES
QRSD INTERVAL: 76 MS
QT INTERVAL: 344 MS
QTC INTERVAL: 434 MS
RBC # BLD AUTO: 4.6 MILLION/UL (ref 3.81–5.12)
SODIUM SERPL-SCNC: 138 MMOL/L (ref 136–145)
SP GR UR STRIP.AUTO: 1.01 (ref 1–1.03)
T WAVE AXIS: 65 DEGREES
TROPONIN I SERPL-MCNC: <0.02 NG/ML
TSH SERPL DL<=0.05 MIU/L-ACNC: 3.85 UIU/ML (ref 0.36–3.74)
UROBILINOGEN UR QL STRIP.AUTO: 0.2 E.U./DL
VENTRICULAR RATE: 96 BPM
WBC # BLD AUTO: 17.17 THOUSAND/UL (ref 4.31–10.16)

## 2019-02-12 PROCEDURE — 96374 THER/PROPH/DIAG INJ IV PUSH: CPT

## 2019-02-12 PROCEDURE — 70450 CT HEAD/BRAIN W/O DYE: CPT

## 2019-02-12 PROCEDURE — 85730 THROMBOPLASTIN TIME PARTIAL: CPT | Performed by: FAMILY MEDICINE

## 2019-02-12 PROCEDURE — 93005 ELECTROCARDIOGRAM TRACING: CPT

## 2019-02-12 PROCEDURE — 83605 ASSAY OF LACTIC ACID: CPT | Performed by: FAMILY MEDICINE

## 2019-02-12 PROCEDURE — 96375 TX/PRO/DX INJ NEW DRUG ADDON: CPT

## 2019-02-12 PROCEDURE — 84443 ASSAY THYROID STIM HORMONE: CPT | Performed by: EMERGENCY MEDICINE

## 2019-02-12 PROCEDURE — 96361 HYDRATE IV INFUSION ADD-ON: CPT

## 2019-02-12 PROCEDURE — 85610 PROTHROMBIN TIME: CPT | Performed by: FAMILY MEDICINE

## 2019-02-12 PROCEDURE — 84145 PROCALCITONIN (PCT): CPT | Performed by: FAMILY MEDICINE

## 2019-02-12 PROCEDURE — 83036 HEMOGLOBIN GLYCOSYLATED A1C: CPT | Performed by: FAMILY MEDICINE

## 2019-02-12 PROCEDURE — 83690 ASSAY OF LIPASE: CPT | Performed by: EMERGENCY MEDICINE

## 2019-02-12 PROCEDURE — 99220 PR INITIAL OBSERVATION CARE/DAY 70 MINUTES: CPT | Performed by: FAMILY MEDICINE

## 2019-02-12 PROCEDURE — 80053 COMPREHEN METABOLIC PANEL: CPT | Performed by: EMERGENCY MEDICINE

## 2019-02-12 PROCEDURE — 85025 COMPLETE CBC W/AUTO DIFF WBC: CPT | Performed by: EMERGENCY MEDICINE

## 2019-02-12 PROCEDURE — 87040 BLOOD CULTURE FOR BACTERIA: CPT | Performed by: FAMILY MEDICINE

## 2019-02-12 PROCEDURE — 71275 CT ANGIOGRAPHY CHEST: CPT

## 2019-02-12 PROCEDURE — 83605 ASSAY OF LACTIC ACID: CPT | Performed by: EMERGENCY MEDICINE

## 2019-02-12 PROCEDURE — 83735 ASSAY OF MAGNESIUM: CPT | Performed by: EMERGENCY MEDICINE

## 2019-02-12 PROCEDURE — 36415 COLL VENOUS BLD VENIPUNCTURE: CPT | Performed by: EMERGENCY MEDICINE

## 2019-02-12 PROCEDURE — 85049 AUTOMATED PLATELET COUNT: CPT | Performed by: FAMILY MEDICINE

## 2019-02-12 PROCEDURE — 81003 URINALYSIS AUTO W/O SCOPE: CPT | Performed by: EMERGENCY MEDICINE

## 2019-02-12 PROCEDURE — 74177 CT ABD & PELVIS W/CONTRAST: CPT

## 2019-02-12 PROCEDURE — 93306 TTE W/DOPPLER COMPLETE: CPT

## 2019-02-12 PROCEDURE — 99285 EMERGENCY DEPT VISIT HI MDM: CPT

## 2019-02-12 PROCEDURE — 87631 RESP VIRUS 3-5 TARGETS: CPT | Performed by: FAMILY MEDICINE

## 2019-02-12 PROCEDURE — 93010 ELECTROCARDIOGRAM REPORT: CPT | Performed by: INTERNAL MEDICINE

## 2019-02-12 PROCEDURE — 84484 ASSAY OF TROPONIN QUANT: CPT | Performed by: EMERGENCY MEDICINE

## 2019-02-12 PROCEDURE — 84484 ASSAY OF TROPONIN QUANT: CPT | Performed by: FAMILY MEDICINE

## 2019-02-12 RX ORDER — MONTELUKAST SODIUM 10 MG/1
10 TABLET ORAL ONCE
Status: COMPLETED | OUTPATIENT
Start: 2019-02-12 | End: 2019-02-12

## 2019-02-12 RX ORDER — TRAMADOL HYDROCHLORIDE 50 MG/1
50 TABLET ORAL EVERY 6 HOURS PRN
Status: ON HOLD | COMMUNITY
End: 2019-02-14 | Stop reason: SDUPTHER

## 2019-02-12 RX ORDER — SODIUM CHLORIDE 9 MG/ML
125 INJECTION, SOLUTION INTRAVENOUS CONTINUOUS
Status: DISCONTINUED | OUTPATIENT
Start: 2019-02-12 | End: 2019-02-14 | Stop reason: HOSPADM

## 2019-02-12 RX ORDER — FLUCONAZOLE 100 MG/1
200 TABLET ORAL ONCE
Status: COMPLETED | OUTPATIENT
Start: 2019-02-12 | End: 2019-02-12

## 2019-02-12 RX ORDER — LEVOTHYROXINE SODIUM 0.07 MG/1
50 TABLET ORAL DAILY
COMMUNITY
End: 2019-04-16

## 2019-02-12 RX ORDER — TEMAZEPAM 15 MG/1
15 CAPSULE ORAL
Status: DISCONTINUED | OUTPATIENT
Start: 2019-02-12 | End: 2019-02-14 | Stop reason: HOSPADM

## 2019-02-12 RX ORDER — ACETAMINOPHEN 325 MG/1
650 TABLET ORAL EVERY 6 HOURS PRN
Status: DISCONTINUED | OUTPATIENT
Start: 2019-02-12 | End: 2019-02-13

## 2019-02-12 RX ORDER — DIPHENHYDRAMINE HCL 25 MG
25 TABLET ORAL ONCE
Status: COMPLETED | OUTPATIENT
Start: 2019-02-12 | End: 2019-02-12

## 2019-02-12 RX ORDER — SACCHAROMYCES BOULARDII 250 MG
250 CAPSULE ORAL 2 TIMES DAILY
Status: ON HOLD | COMMUNITY
End: 2019-02-12 | Stop reason: ALTCHOICE

## 2019-02-12 RX ORDER — HYDROMORPHONE HCL/PF 1 MG/ML
1 SYRINGE (ML) INJECTION ONCE
Status: COMPLETED | OUTPATIENT
Start: 2019-02-12 | End: 2019-02-12

## 2019-02-12 RX ORDER — FAMOTIDINE 20 MG/1
20 TABLET, FILM COATED ORAL ONCE
Status: COMPLETED | OUTPATIENT
Start: 2019-02-12 | End: 2019-02-12

## 2019-02-12 RX ORDER — LIDOCAINE 50 MG/G
1 PATCH TOPICAL DAILY
Status: DISCONTINUED | OUTPATIENT
Start: 2019-02-12 | End: 2019-02-14 | Stop reason: HOSPADM

## 2019-02-12 RX ORDER — MONTELUKAST SODIUM 10 MG/1
10 TABLET ORAL 2 TIMES DAILY
Status: DISCONTINUED | OUTPATIENT
Start: 2019-02-12 | End: 2019-02-14 | Stop reason: HOSPADM

## 2019-02-12 RX ORDER — PANTOPRAZOLE SODIUM 40 MG/1
40 TABLET, DELAYED RELEASE ORAL
Status: DISCONTINUED | OUTPATIENT
Start: 2019-02-12 | End: 2019-02-14 | Stop reason: HOSPADM

## 2019-02-12 RX ORDER — LORAZEPAM 2 MG/ML
1 INJECTION INTRAMUSCULAR EVERY 6 HOURS PRN
Status: DISCONTINUED | OUTPATIENT
Start: 2019-02-12 | End: 2019-02-14 | Stop reason: HOSPADM

## 2019-02-12 RX ORDER — VANCOMYCIN HYDROCHLORIDE 1 G/200ML
15 INJECTION, SOLUTION INTRAVENOUS EVERY 12 HOURS
Status: DISCONTINUED | OUTPATIENT
Start: 2019-02-12 | End: 2019-02-12

## 2019-02-12 RX ORDER — TEMAZEPAM 30 MG/1
15 CAPSULE ORAL
COMMUNITY
End: 2019-07-16 | Stop reason: SDUPTHER

## 2019-02-12 RX ORDER — ONDANSETRON 2 MG/ML
4 INJECTION INTRAMUSCULAR; INTRAVENOUS EVERY 4 HOURS PRN
Status: DISCONTINUED | OUTPATIENT
Start: 2019-02-12 | End: 2019-02-12

## 2019-02-12 RX ORDER — CLOTRIMAZOLE 10 MG/1
10 LOZENGE ORAL; TOPICAL 3 TIMES DAILY
Status: DISCONTINUED | OUTPATIENT
Start: 2019-02-12 | End: 2019-02-14 | Stop reason: HOSPADM

## 2019-02-12 RX ORDER — ONDANSETRON 2 MG/ML
4 INJECTION INTRAMUSCULAR; INTRAVENOUS EVERY 4 HOURS PRN
Status: DISCONTINUED | OUTPATIENT
Start: 2019-02-12 | End: 2019-02-14 | Stop reason: HOSPADM

## 2019-02-12 RX ORDER — PROCHLORPERAZINE 25 MG
25 SUPPOSITORY, RECTAL RECTAL ONCE
Status: COMPLETED | OUTPATIENT
Start: 2019-02-12 | End: 2019-02-12

## 2019-02-12 RX ORDER — LEVOTHYROXINE SODIUM 0.05 MG/1
50 TABLET ORAL
Status: DISCONTINUED | OUTPATIENT
Start: 2019-02-12 | End: 2019-02-14 | Stop reason: HOSPADM

## 2019-02-12 RX ORDER — LEVOFLOXACIN 5 MG/ML
750 INJECTION, SOLUTION INTRAVENOUS EVERY 24 HOURS
Status: DISCONTINUED | OUTPATIENT
Start: 2019-02-12 | End: 2019-02-14 | Stop reason: HOSPADM

## 2019-02-12 RX ORDER — PROMETHAZINE HYDROCHLORIDE 25 MG/ML
25 INJECTION, SOLUTION INTRAMUSCULAR; INTRAVENOUS ONCE
Status: COMPLETED | OUTPATIENT
Start: 2019-02-12 | End: 2019-02-12

## 2019-02-12 RX ORDER — FAMOTIDINE 20 MG/1
20 TABLET, FILM COATED ORAL 2 TIMES DAILY
Status: DISCONTINUED | OUTPATIENT
Start: 2019-02-12 | End: 2019-02-14 | Stop reason: HOSPADM

## 2019-02-12 RX ADMIN — FAMOTIDINE 20 MG: 20 TABLET ORAL at 17:37

## 2019-02-12 RX ADMIN — SODIUM CHLORIDE 125 ML/HR: 0.9 INJECTION, SOLUTION INTRAVENOUS at 14:27

## 2019-02-12 RX ADMIN — LEVOTHYROXINE SODIUM 50 MCG: 50 TABLET ORAL at 14:13

## 2019-02-12 RX ADMIN — CLOTRIMAZOLE 10 MG: 10 LOZENGE ORAL at 21:36

## 2019-02-12 RX ADMIN — VANCOMYCIN HYDROCHLORIDE 1250 MG: 1 INJECTION, POWDER, LYOPHILIZED, FOR SOLUTION INTRAVENOUS at 21:36

## 2019-02-12 RX ADMIN — PREDNISONE 50 MG: 20 TABLET ORAL at 08:33

## 2019-02-12 RX ADMIN — MONTELUKAST SODIUM 10 MG: 10 TABLET, FILM COATED ORAL at 08:33

## 2019-02-12 RX ADMIN — LEVOFLOXACIN 750 MG: 5 INJECTION, SOLUTION INTRAVENOUS at 19:46

## 2019-02-12 RX ADMIN — SODIUM CHLORIDE 1000 ML: 0.9 INJECTION, SOLUTION INTRAVENOUS at 07:29

## 2019-02-12 RX ADMIN — DIPHENHYDRAMINE HCL 25 MG: 25 TABLET, FILM COATED ORAL at 08:33

## 2019-02-12 RX ADMIN — PROCHLORPERAZINE 25 MG: 25 SUPPOSITORY RECTAL at 10:12

## 2019-02-12 RX ADMIN — HYDROMORPHONE HYDROCHLORIDE 1 MG: 1 INJECTION, SOLUTION INTRAMUSCULAR; INTRAVENOUS; SUBCUTANEOUS at 11:09

## 2019-02-12 RX ADMIN — TEMAZEPAM 15 MG: 15 CAPSULE ORAL at 21:36

## 2019-02-12 RX ADMIN — MONTELUKAST SODIUM 10 MG: 10 TABLET, FILM COATED ORAL at 17:37

## 2019-02-12 RX ADMIN — PANTOPRAZOLE SODIUM 40 MG: 40 TABLET, DELAYED RELEASE ORAL at 17:37

## 2019-02-12 RX ADMIN — ACETAMINOPHEN 650 MG: 325 TABLET, FILM COATED ORAL at 18:55

## 2019-02-12 RX ADMIN — SODIUM CHLORIDE 1000 ML: 0.9 INJECTION, SOLUTION INTRAVENOUS at 06:49

## 2019-02-12 RX ADMIN — PROMETHAZINE HYDROCHLORIDE 25 MG: 25 INJECTION, SOLUTION INTRAMUSCULAR; INTRAVENOUS at 07:39

## 2019-02-12 RX ADMIN — IODIXANOL 100 ML: 320 INJECTION, SOLUTION INTRAVASCULAR at 09:52

## 2019-02-12 RX ADMIN — FAMOTIDINE 20 MG: 20 TABLET ORAL at 08:33

## 2019-02-12 RX ADMIN — LORAZEPAM 1 MG: 2 INJECTION, SOLUTION INTRAMUSCULAR; INTRAVENOUS at 19:51

## 2019-02-12 RX ADMIN — HYDROMORPHONE HYDROCHLORIDE 1 MG: 1 INJECTION, SOLUTION INTRAMUSCULAR; INTRAVENOUS; SUBCUTANEOUS at 06:49

## 2019-02-12 RX ADMIN — LIDOCAINE 1 PATCH: 50 PATCH TOPICAL at 12:52

## 2019-02-12 RX ADMIN — THIAMINE HYDROCHLORIDE 100 MG: 100 INJECTION, SOLUTION INTRAMUSCULAR; INTRAVENOUS at 14:14

## 2019-02-12 RX ADMIN — FLUCONAZOLE 200 MG: 100 TABLET ORAL at 14:13

## 2019-02-12 RX ADMIN — CLOTRIMAZOLE 10 MG: 10 LOZENGE ORAL at 17:38

## 2019-02-12 RX ADMIN — ENOXAPARIN SODIUM 40 MG: 40 INJECTION SUBCUTANEOUS at 14:13

## 2019-02-12 NOTE — ASSESSMENT & PLAN NOTE
Patient receives Remicade every 6 weeks  Most recent infusion was 4 weeks ago  She is established with [facility]

## 2019-02-12 NOTE — ED NOTES
Dr Duane Norris made aware that quantity not sufficient for aptt/ PT/ INR     Iftikhar Lauren RN  02/12/19 7242

## 2019-02-12 NOTE — ASSESSMENT & PLAN NOTE
WBC count and tachycardia  No obvious source of infection other than oral thrush   Check blood cultures x 2   pro calcitonin now and in am  UA normal  CT chest abdomen pelvis without focus of infection   Leukocytosis, tachycardia resolved  No further febrile episodes  Will obtain MRI of the lumbar spine  Continue vancomycin and Levaquin  Follow up blood cultures and procalcitonin

## 2019-02-12 NOTE — ED PROVIDER NOTES
History  Chief Complaint   Patient presents with    Chest Pain     Patient statest that she has had palpatations since Thursday, that woke her up this morning around 0430 this morning with chest pain  23-year-old female with a history of mastocytosis presents with multiple complaints this morning  Patient states that on Monday she was at her she for injections Xolair and hydration therapy  She states that she has been being treated with prednisone taper for 2 weeks because of her chronic low back pain  She states that on Thursday of last week she began with palpitations which have been intermittent since that time  She states she began with palpitations and chest discomfort this morning at 0430 hours when she woke  She states that she has been nauseous for several days  She states she began with occipital headache when the ambulance arrived this morning  Patient states she chronically gets headaches which are intermittent  EMS states the patient was found on the floor in the fetal position very anxious       History provided by:  EMS personnel  Chest Pain   Pain location:  L chest  Pain quality: aching, pressure and tightness    Pain severity:  Moderate  Onset quality:  Sudden  Duration: 2  Timing:  Intermittent  Progression:  Waxing and waning  Chronicity:  Recurrent  Context: breathing    Relieved by:  Nothing  Worsened by:  Deep breathing, exertion and movement  Ineffective treatments:  None tried  Associated symptoms: anxiety, back pain, diaphoresis, nausea and shortness of breath    Associated symptoms: no abdominal pain, no AICD problem, no altered mental status, no anorexia, no dizziness and no headache    Risk factors: no aortic disease, no birth control and no coronary artery disease    Back Pain   Location:  Lumbar spine  Quality:  Aching  Radiates to: Right paraspinal musculature  No midline tenderness    Pain severity:  Moderate  Timing:  Intermittent  Progression:  Waxing and waning  Context: not emotional stress, not falling, not jumping from heights and not lifting heavy objects    Relieved by:  Nothing  Worsened by:  Twisting and palpation  Associated symptoms: chest pain    Associated symptoms: no abdominal pain, no dysuria and no headaches    Risk factors: no hx of cancer and no hx of osteoporosis        Prior to Admission Medications   Prescriptions Last Dose Informant Patient Reported? Taking?    EPINEPHrine (EPIPEN 2-MARYSOL IJ)   Yes No   Sig: EpiPen   prn   LEVOXYL 50 MCG tablet   Yes No   LORazepam (ATIVAN) 1 mg tablet   No No   Sig: Take 1 tablet (1 mg total) by mouth every 6 (six) hours as needed for anxiety   Probiotic Product (PROBIOTIC-10 PO)   Yes No   Sig: floistar   budesonide (ENTOCORT EC) 3 MG capsule   No No   Sig: TAKE 1 CAPSULE BY MOUTH 3 TIMES DAILY   cetirizine (ZyrTEC) 10 mg tablet   Yes No   Sig: Take 10 mg by mouth daily   cholecalciferol (VITAMIN D) 400 units/mL   No No   Sig: Take 2 5 mL (1,000 Units total) by mouth daily   diphenhydrAMINE (BENADRYL) 25 mg tablet   Yes No   Sig: Take 50 mg by mouth daily at bedtime as needed for itching     furosemide (LASIX) 20 mg tablet   No No   Sig: Take 1 tablet (20 mg total) by mouth daily   hydrOXYzine HCL (ATARAX) 25 mg tablet   Yes No   inFLIXimab (REMICADE) 100 mg   Yes No   Sig: Infuse into a venous catheter   levalbuterol (XOPENEX HFA) 45 mcg/act inhaler   Yes No   Sig: Inhale 2 puffs every 4 (four) hours as needed   liothyronine (CYTOMEL) 5 mcg tablet   Yes No   Sig: Take 5 mcg by mouth 2 (two) times a day     montelukast (SINGULAIR) 10 mg tablet   Yes No   Sig: Take 10 mg by mouth 2 (two) times a day     omalizumab (XOLAIR) 150 mg   Yes No   Sig: Inject 300 mg under the skin     pantoprazole (PROTONIX) 40 mg tablet   Yes No   Sig: Take 40 mg by mouth daily   potassium bicarbonate (K-LYTE) 25 MEQ disintegrating tablet   Yes No   Sig: Take 25 mEq by mouth 2 (two) times a day   promethazine (PHENERGAN) 25 mg tablet Self Yes No   Sig: Take 25 mg by mouth every 6 (six) hours as needed for nausea or vomiting   ranitidine (ZANTAC) 150 MG capsule   Yes No   Sig: Take 150 mg by mouth 2 (two) times a day   temazepam (RESTORIL) 30 mg capsule   No No   Sig: Take 1 capsule (30 mg total) by mouth daily at bedtime as needed for sleep      Facility-Administered Medications Last Administration Doses Remaining   cyanocobalamin injection 1,000 mcg 1/11/2019 11:48 AM           Past Medical History:   Diagnosis Date    Anxiety     Asthma     Chronic kidney disease     Deep vein thrombosis ([facility] [address] )     Disease of thyroid gland     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 )        Past Surgical History:   Procedure Laterality Date    APPENDECTOMY      CHOLECYSTECTOMY      EGD AND COLONOSCOPY N/A 9/12/2017    Procedure: EGD AND COLONOSCOPY;  Surgeon: Scar Aleman MD;  [facility]; Service: Gastroenterology    ERCP N/A 9/15/2017    Procedure: ENDOSCOPIC RETROGRADE CHOLANGIOPANCREATOGRAPHY (ERCP); Surgeon: Dominique Blanc MD;  [facility];   Service: Gastroenterology    HYSTERECTOMY      KNEE SURGERY Right     LAPAROSCOPIC ENDOMETRIOSIS FULGURATION      ORIF ANKLE FRACTURE Left     AR KNEE SCOPE,MED/LAT MENISECTOMY Left 5/12/2017    Procedure: POSSIBLE MEDIAL MENISECTOMY;  Surgeon: Becky Garcia MD;  [facility];  Service: Orthopedics    AR KNEE [address] CART Left 5/12/2017    Procedure: KNEE ARTHROSCOPY EVALUATION, CHONDROPLASTY;  Surgeon: Becky Garcia MD;  [facility];  Service: Orthopedics    AR LAP,DIAGNOSTIC ABDOMEN N/A 12/12/2017    Procedure: LAPAROSCOPY DIAGNOSTIC  WITH LYSIS OF ADHESIONS; Surgeon: Lisbet Raza DO;  [facility];  Service: General       No family history on file  I have reviewed and agree with the history as documented  Social History     Tobacco Use    Smoking status: Never Smoker    Smokeless tobacco: Never Used   Substance Use Topics    Alcohol use: No    Drug use: No        Review of Systems   Constitutional: Positive for diaphoresis  HENT: Negative for congestion, dental problem, drooling and ear discharge  Eyes: Negative for pain, discharge, redness and itching  Respiratory: Positive for shortness of breath  Cardiovascular: Positive for chest pain  Gastrointestinal: Positive for nausea  Negative for abdominal pain and anorexia  Endocrine: Negative for cold intolerance, heat intolerance and polydipsia  Genitourinary: Negative for difficulty urinating, dyspareunia and dysuria  Musculoskeletal: Positive for back pain  Skin: Negative for color change and pallor  Allergic/Immunologic: Negative for environmental allergies and food allergies  Neurological: Negative for dizziness, facial asymmetry, light-headedness and headaches  Hematological: Negative for adenopathy  Psychiatric/Behavioral: Negative for agitation, behavioral problems and confusion  All other systems reviewed and are negative  Physical Exam  Physical Exam   Constitutional: She appears well-developed and well-nourished  Non-toxic appearance  She does not appear ill  Patient is very anxious   HENT:   Head: Normocephalic and atraumatic  Eyes: Pupils are equal, round, and reactive to light  Neck: Normal range of motion  No hepatojugular reflux and no JVD present  No tracheal deviation present  No thyromegaly present  Cardiovascular: Regular rhythm and normal pulses  Exam reveals no S3, no S4, no distant heart sounds and no friction rub  No murmur heard  Pulses:       Carotid pulses are 2+ on the right side, and 2+ on the left side         Radial pulses are 2+ on the right side, and 2+ on the left side  Dorsalis pedis pulses are 2+ on the right side, and 2+ on the left side  Posterior tibial pulses are 2+ on the right side, and 2+ on the left side  Pulmonary/Chest: Effort normal  No accessory muscle usage or stridor  No tachypnea  No respiratory distress  She has no decreased breath sounds  She has no wheezes  She has no rales  Abdominal: She exhibits no distension  There is tenderness  Musculoskeletal: She exhibits no edema, tenderness or deformity  Right lower leg: She exhibits no tenderness and no edema  Left lower leg: She exhibits no tenderness and no edema  Neurological: She is alert  She displays normal reflexes  No cranial nerve deficit  Coordination normal    Skin: Skin is warm  Capillary refill takes less than 2 seconds  No erythema  No pallor  Psychiatric: Her mood appears anxious  Cognition and memory are normal    Vitals reviewed        Vital Signs  ED Triage Vitals   Temperature Pulse Respirations Blood Pressure SpO2   02/12/19 0628 02/12/19 0628 02/12/19 0628 02/12/19 0628 02/12/19 0628   98 4 °F (36 9 °C) 93 18 130/74 100 %      Temp Source Heart Rate Source Patient Position - Orthostatic VS BP Location FiO2 (%)   02/12/19 0628 02/12/19 0628 02/12/19 0628 02/12/19 0628 --   Temporal Monitor Lying Left arm       Pain Score       02/12/19 0649       Worst Possible Pain           Vitals:    02/12/19 0628   BP: 130/74   Pulse: 93   Patient Position - Orthostatic VS: Lying       Visual Acuity      ED Medications  Medications   sodium chloride 0 9 % bolus 1,000 mL (1,000 mL Intravenous New Bag 2/12/19 0649)    EMS REPLENISHMENT MED ( Does not apply Given to EMS 2/12/19 0649)   HYDROmorphone (DILAUDID) injection 1 mg (1 mg Intravenous Given 2/12/19 0649)       Diagnostic Studies  Results Reviewed     Procedure Component Value Units Date/Time    CBC and differential [[id number]]  (Abnormal) Collected:  02/12/19 0649    Lab Status:  Final result Specimen:  Blood from Arm, Left Updated:  02/12/19 0654     WBC 17 17 Thousand/uL      RBC 4 60 Million/uL      Hemoglobin 15 0 g/dL      Hematocrit 44 9 %      MCV 98 fL      MCH 32 6 pg      MCHC 33 4 g/dL      RDW 13 4 %      MPV 8 5 fL      Platelets 323 Thousands/uL      nRBC 0 /100 WBCs      Neutrophils Relative 81 %      Immat GRANS % 0 %      Lymphocytes Relative 14 %      Monocytes Relative 4 %      Eosinophils Relative 1 %      Basophils Relative 0 %      Neutrophils Absolute 13 88 Thousands/µL      Immature Grans Absolute 0 07 Thousand/uL      Lymphocytes Absolute 2 35 Thousands/µL      Monocytes Absolute 0 61 Thousand/µL      Eosinophils Absolute 0 19 Thousand/µL      Basophils Absolute 0 07 Thousands/µL     Troponin I [[id number]] Collected:  02/12/19 0649    Lab Status: In process Specimen:  Blood from Arm, Left Updated:  02/12/19 0652    Comprehensive metabolic panel [[id number]] Collected:  02/12/19 0649    Lab Status: In process Specimen:  Blood from Arm, Left Updated:  02/12/19 0652    TSH [[id number]] Collected:  02/12/19 0649    Lab Status: In process Specimen:  Blood from Arm, Left Updated:  02/12/19 0652    Magnesium [[id number]] Collected:  02/12/19 0649    Lab Status: In process Specimen:  Blood from Arm, Left Updated:  02/12/19 0652    Lipase [[id number]] Collected:  02/12/19 0649    Lab Status: In process Specimen:  Blood from Arm, Left Updated:  02/12/19 [address] [[id number]] Collected:  02/12/19 0649    Lab Status: In process Specimen:  Blood from Arm, Left Updated:  02/12/19 0652    APTT [[id number]] Collected:  02/12/19 0649    Lab Status: In process Specimen:  Blood from Arm, Left Updated:  02/12/19 0652    Lactic acid, plasma [[id number]] Collected:  02/12/19 0649    Lab Status:   In process Specimen:  Blood from Arm, Left Updated:  02/12/19 0652    POCT pregnancy, urine [[id number]]     Lab Status:  No result     UA w Reflex to Microscopic w Reflex to Culture [[id number]]     Lab Status:  No result Specimen:  Urine                  CT head without contrast    (Results Pending)   PE Study with CT Abdomen and Pelvis with contrast    (Results Pending)   CT recon only lumbar spine    (Results Pending)              Procedures  Procedures       Phone Contacts  ED Phone Contact    ED Course  ED Course as of Feb 12 0657   Tue Feb 12, 2019   0657 CBC and differential(!)         HEART Risk Score      Most Recent Value   History  1 Filed at: 02/12/2019 0646   ECG  0 Filed at: 02/12/2019 8282   Age  0 Filed at: 02/12/2019 0646   Risk Factors  1 Filed at: 02/12/2019 0646   Troponin  0 Filed at: 02/12/2019 0646   Heart Score Risk Calculator   History  1 Filed at: 02/12/2019 0646   ECG  0 Filed at: 02/12/2019 0646   Age  0 Filed at: 02/12/2019 0646   Risk Factors  1 Filed at: 02/12/2019 0646   Troponin  0 Filed at: 02/12/2019 0646   HEART Score  2 Filed at: 02/12/2019 1607   HEART Score  2 Filed at: 02/12/2019 3511                            MDM  Number of Diagnoses or Management Options  Diagnosis management comments: Patient states she cannot have aspirin  Differential diagnosis 1  Pulmonary embolism 2 acute coronary syndrome 3 pneumonia 4 gastritis 5 pancreatitis 6 colitis 7 anxiety  We will check labs  We will perform CT of the chest abdomen pelvis lumbar reconstructions along with CT scan of the head  Patient states that she cannot take aspirin because of her multitude of allergies  She also states the only pain medication she can have is  Dilaudid           Amount and/or Complexity of Data Reviewed  Clinical lab tests: reviewed  Tests in the radiology section of CPT®: reviewed  Tests in the medicine section of CPT®: reviewed    Risk of Complications, Morbidity, and/or Mortality  Presenting problems: high  Diagnostic procedures: high  Management options: high        Disposition  Final diagnoses:   None     ED Disposition     None      Follow-up Information    None Patient's Medications   Discharge Prescriptions    No medications on file     No discharge procedures on file      ED Provider  Electronically Signed by           Isi Lake DO  02/14/19 8176

## 2019-02-12 NOTE — ED NOTES
Unable to obtain PT/ptt INR lab at this time  Dr Marlen Khan made aware  Pt nauseated on sips of water  Po medications held at this time to let phenergan work per verbal order from Dr Marlen Khan  Lights dimmed   Call bell in [facility]  02/12/19 4201

## 2019-02-12 NOTE — ED PROCEDURE NOTE
PROCEDURE  ECG 12 Lead Documentation  Date/Time: 2/12/2019 6:45 AM  Performed by: Kerwin Chamorro DO  Authorized by: Kerwin Chamorro DO     Indications / Diagnosis:  Chest pain   ECG reviewed by me, the ED Provider: yes    Patient location:  ED  Previous ECG:     Previous ECG:  Unavailable (Unchanged from EKG from September 9, 2017)  Interpretation:     Interpretation: non-specific    Rate:     ECG rate:  90    ECG rate assessment: normal    Rhythm:     Rhythm: sinus rhythm    ST segments:     ST segments:  Non-specific         Kerwin Chamorro DO  02/12/19 7885

## 2019-02-12 NOTE — ED NOTES
Dr Cassidy Public made aware of patients pain of 9/10  No new orders received at this time   Lab at bedside to attempt to draw labs     Gloria Sender, [address] [facility]  02/12/19 1570

## 2019-02-12 NOTE — H&P
H&P Exam - Codie Tompkins 37 y o  female MRN: [id number]    Unit/Bed#: 413-01 Encounter: [id number]    SIRS (systemic inflammatory response syndrome) ([facility] )  Assessment & Plan  WBC count and tachycardia  No obvious source of infection other than oral thrush   Check blood cultures x 2   pro calcitonin now and in am  UA normal  CT chest abdomen pelvis without focus of infection     Thrush  Assessment & Plan  Nystatin swish and swallow  Oral Diflucan 200 mg    Palpitation  Assessment & Plan  Ongoing for past 4 weeks  Trend troponin x 2  2D echo  Fasting lipid panel  Hemoglobin a1c     Chronic back pain  Assessment & Plan  Follows with [facility]   Had MRI of lumbar spine 5 days ago and was told it's ok other than discs  I can not view results through care everywhere but see the MRI   Supportive care PT/OT and lidoderm patch     Mast cell disorder  Assessment & Plan  Patient is established at [facility]  follow-up    CHF (congestive heart failure) ([place] [address] )  Assessment & Plan  Echocardiogram in 2017 was normal  Will repeat 2D echocardiogram    Ulcerative colitis [facility])  Assessment & Plan  Patient receives Remicade every 6 weeks  Most recent infusion was 4 weeks ago  She is established with [facility]      History of Present Illness      The patient is a pleasant 49-year-old female known to me from previous hospitalizations who presents to emergency room today with a chief complaint of palpitations ongoing for the past 4 days  Patient states she has just recently finished a prednisone taper and has noted palpitations  She follows at [facility] for her medical care which includes immunology for mastocytosis and she follows with [facility] for Rheumatology Dr Muna Clemons, She is currently being evaluated for mixed connective tissue disease  Her palpitation have been present for the past 3-4 weeks   She felt they were due to prednisone but today she was flushed and diaphoretic which prompted and ED visit and evaluation  On exam she is complaining of back pain which has been present for sometime  She follows with [facility] for this and has had recent MRI last week  Today prior to presenting to the emergency room the patient was noted to be diaphoretic and complained of intermittent shortness of breath  Accompanied by palpitations  She was nauseated but did not vomit at home but had an episode of emesis in the ED  She has just completed a course of prednisone on Saturday  The patient was given 50mg of prednisone in the ED  She also received 2mg of IV dilaudid  She was noted to be hypotensive without complaints on my exam on the medical floor  Review of Systems    Historical Information   Past Medical History:   Diagnosis Date    Anxiety     Asthma     Chronic kidney disease     [facility]     Disease of thyroid gland     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    Generalized anxiety disorder 8/26/2017    Heart murmur     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[facility])     Migraine     My[facility] infarction ([facility] )     NSTEMI  pt denies, documented in cardio note    Pancreatitis     sphinger of     Psychiatric disorder     RA (rheumatoid arthritis) ([facility]     [facility]      Past Surgical History:   Procedure Laterality Date    APPENDECTOMY      CHOLECYSTECTOMY      EGD AND COLONOSCOPY N/A 9/12/2017    Procedure: EGD AND COLONOSCOPY;  Surgeon: Magda Menezes MD;  [facility]; Service: Gastroenterology    ERCP N/A 9/15/2017    Procedure: ENDOSCOPIC RETROGRADE CHOLANGIOPANCREATOGRAPHY (ERCP); Surgeon: James Vo MD;  [facility];   Service: Gastroenterology    HYSTERECTOMY      KNEE SURGERY Right     LAPAROSCOPIC ENDOMETRIOSIS FULGURATION      ORIF ANKLE FRACTURE Left     AK KNEE SCOPE,MED/LAT MENISECTOMY Left 5/12/2017    Procedure: POSSIBLE MEDIAL MENISECTOMY;  Surgeon: eKndy Martin MD;  [facility];  Service: Orthopedics    [address] [place] [address] ARTICULAR CART Left 5/12/2017    Procedure: KNEE ARTHROSCOPY EVALUATION, CHONDROPLASTY;  Surgeon: Mya Espinal MD;  [facility];  Service: Orthopedics    VA LAP,DIAGNOSTIC ABDOMEN N/A 12/12/2017    Procedure: LAPAROSCOPY DIAGNOSTIC  WITH LYSIS OF ADHESIONS;  Surgeon: Ludy Hooper DO;  Location:  MAIN OR;  Service: General     Social History   Social History     Substance and Sexual Activity   Alcohol Use No     Social History     Substance and Sexual Activity   Drug Use Yes    Types: Marijuana     Social History     Tobacco Use   Smoking Status Never Smoker   Smokeless Tobacco Never Used     Family History: non-contributory    Meds/Allergies   all medications and allergies reviewed  Allergies   Allergen Reactions    Amitriptyline Anaphylaxis     According to the patient she had nphylaxis    Aspergillus Fumigatus Other (See Comments), Hives and Swelling    Azathioprine Other (See Comments) and Anaphylaxis     pancreatitis  According to patient she needed Epipen    Sumatriptan Anaphylaxis     Bradycardia, sob, back pressure, head pain,throat tightness  According to the patient she had anphylaxis    Contrast [Iodinated Diagnostic Agents]      Pt states needs to be premedicated prior to injection    Corn Dextrin      Any corn based products    Corn Oil Hives    Gelatin     Ibuprofen Hives and Throat Swelling    Malto Dextrin [Dextrin]     Other      Gel caps, maltodextrose, dextrose    Penicillins Hives and Throat Swelling    Penicillium Notatum     Sulfa Antibiotics Hives    Sulfate     Sulfites        Objective   First Vitals:   Blood Pressure: 130/74 (02/12/19 0628)  Pulse: 93 (02/12/19 0628)  Temperature: 98 4 °F (36 9 °C) (02/12/19 0628)  Temp Source: Temporal (02/12/19 8378)  Respirations: 18 (02/12/19 0628)  Height: 5' 5" (165 1 cm) (02/12/19 9105)  Weight - Scale: 68 7 kg (151 lb 7 3 oz) (02/12/19 0628)  SpO2: 100 % (02/12/19 0628)    Current Vitals:   Blood Pressure: (!) 80/42 (02/12/19 1148)  Pulse: 98 (02/12/19 1147)  Temperature: 99 2 °F (37 3 °C) (02/12/19 1147)  Temp Source: Temporal (02/12/19 1147)  Respirations: 18 (02/12/19 1147)  Height: 5' 5" (165 1 cm) (02/12/19 6212)  Weight - Scale: 68 7 kg (151 lb 7 3 oz) (02/12/19 0628)  SpO2: 94 % (02/12/19 1147)      Intake/Output Summary (Last 24 hours) at 2/12/2019 1234  Last data filed at 2/12/2019 8314  Gross per 24 hour   Intake 2000 ml   Output --   Net 2000 ml       Invasive Devices     Peripheral Intravenous Line            Peripheral IV 02/12/19 Left Antecubital less than 1 day                Physical Exam   Constitutional: She appears well-developed and well-nourished  HENT:   Head: Normocephalic and atraumatic  Eyes: No scleral icterus  Neck: No JVD present  Cardiovascular: Normal rate and regular rhythm  Pulmonary/Chest: No stridor  No respiratory distress  Abdominal: She exhibits no distension and no mass  There is no tenderness  There is no guarding  Musculoskeletal:   Back pain noted in L4-L5 region  No paraspinal spasm  Normal ROM  No weakness or decreased sensation in lower extremities    Neurological: No cranial nerve deficit  She exhibits normal muscle tone  Skin: Capillary refill takes 2 to 3 seconds  No rash noted  No erythema  Lab Results:   Lab Results   Component Value Date    WBC 17 17 (H) 02/12/2019    HGB 15 0 02/12/2019    HCT 44 9 02/12/2019    MCV 98 02/12/2019     (H) 02/12/2019     Lab Results   Component Value Date    GLUCOSE 96 01/16/2015    CALCIUM 8 6 02/12/2019     01/16/2015    K 4 3 02/12/2019    CO2 26 02/12/2019     02/12/2019    BUN 13 02/12/2019    CREATININE 0 78 02/12/2019       Imaging:   CT recon only lumbar spine   Final Result      No fracture or traumatic subluxation  Workstation performed: [id number]         PE Study with CT Abdomen and Pelvis with contrast   Final Result   1    No acute findings in the chest   Specifically, negative for acute or chronic pulmonary thromboembolism within the constraints of a exam mildly degraded by respiratory motion artifact  2   No acute findings in the abdomen  3   Moderate intrahepatic and extra hepatic biliary ductal dilation appear similar to prior  Unchanged prominence of the pancreatic duct  Recommend correlation with LFTs for obstructive pattern  4   Nonobstructing bilateral intrarenal calculi are unchanged  Workstation performed: [id number]         CT head without contrast   Final Result      No acute intracranial abnormality                    Workstation performed: [id number]             EKG, Pathology, and Other Studies:   NSR    Code Status: Level 1 - Full Code  Advance Directive and Living Will:      Power of :    POLST:      Counseling / Coordination of Care:

## 2019-02-12 NOTE — NURSING NOTE
Arrived via stretcher from ER to room 413  Oriented to surroundings and call system  Call bell placed in reach   at bedside

## 2019-02-12 NOTE — ASSESSMENT & PLAN NOTE
Follows with [facility]   Had MRI 5 days ago and was told it's ok other than discs  I can not view results through care everywhere but see the MRI   Supportive care PT/OT and lidoderm patch   Given no other focus of infection will obtain an MRI of the lumbar spine with IV contrast 2/13/19

## 2019-02-12 NOTE — ED NOTES
Pt c/o throbbing head  Dr Jenny Mcgrath made aware  No new orders received        Ruffus Aschoff, RN  02/12/19 2216

## 2019-02-12 NOTE — ED NOTES
Dr Arleth Fraire made aware of patients allergy to CT dye and necessity to medicate prior to test  Orders to be placed     Vicki Drummond RN  02/12/19 6498

## 2019-02-12 NOTE — ED NOTES
Pt actively vomiting a moderate amount of emesis  Dr Lis Grant made aware  Orders for compazine received        Eugenia Samson RN  02/12/19 5943

## 2019-02-12 NOTE — ED NOTES
Pt c/o feeling nauseated  Dr Tracy Conner made aware  Orders for phnergan received        Gaby Madrigal, RN  02/12/19 0728

## 2019-02-12 NOTE — ED NOTES
Verbal Orders from Dr Tracy Conner to hold off on obtaining PT/ ptt / INR lab test at this time     Gaby Madrigal RN  02/12/19 9944

## 2019-02-13 ENCOUNTER — APPOINTMENT (OUTPATIENT)
Dept: MRI IMAGING | Facility: HOSPITAL | Age: 44
DRG: 309 | End: 2019-02-13
Payer: COMMERCIAL

## 2019-02-13 LAB
ALBUMIN SERPL BCP-MCNC: 2.9 G/DL (ref 3.5–5)
ALP SERPL-CCNC: 42 U/L (ref 46–116)
ALT SERPL W P-5'-P-CCNC: 40 U/L (ref 12–78)
ANION GAP SERPL CALCULATED.3IONS-SCNC: 7 MMOL/L (ref 4–13)
AST SERPL W P-5'-P-CCNC: 24 U/L (ref 5–45)
BASOPHILS # BLD AUTO: 0.03 THOUSANDS/ΜL (ref 0–0.1)
BASOPHILS NFR BLD AUTO: 0 % (ref 0–1)
BILIRUB SERPL-MCNC: 0.5 MG/DL (ref 0.2–1)
BUN SERPL-MCNC: 7 MG/DL (ref 5–25)
CALCIUM SERPL-MCNC: 7.8 MG/DL (ref 8.3–10.1)
CHLORIDE SERPL-SCNC: 106 MMOL/L (ref 100–108)
CHOLEST SERPL-MCNC: 133 MG/DL (ref 50–200)
CO2 SERPL-SCNC: 28 MMOL/L (ref 21–32)
CREAT SERPL-MCNC: 0.6 MG/DL (ref 0.6–1.3)
EOSINOPHIL # BLD AUTO: 0.14 THOUSAND/ΜL (ref 0–0.61)
EOSINOPHIL NFR BLD AUTO: 2 % (ref 0–6)
ERYTHROCYTE [DISTWIDTH] IN BLOOD BY AUTOMATED COUNT: 13.8 % (ref 11.6–15.1)
FLUAV AG SPEC QL: NOT DETECTED
FLUBV AG SPEC QL: NOT DETECTED
GFR SERPL CREATININE-BSD FRML MDRD: 110 ML/MIN/1.73SQ M
GLUCOSE SERPL-MCNC: 87 MG/DL (ref 65–140)
HCT VFR BLD AUTO: 39.4 % (ref 34.8–46.1)
HDLC SERPL-MCNC: 54 MG/DL (ref 40–60)
HGB BLD-MCNC: 12.8 G/DL (ref 11.5–15.4)
IMM GRANULOCYTES # BLD AUTO: 0.03 THOUSAND/UL (ref 0–0.2)
IMM GRANULOCYTES NFR BLD AUTO: 0 % (ref 0–2)
LDLC SERPL CALC-MCNC: 62 MG/DL (ref 0–100)
LYMPHOCYTES # BLD AUTO: 2.02 THOUSANDS/ΜL (ref 0.6–4.47)
LYMPHOCYTES NFR BLD AUTO: 26 % (ref 14–44)
MAGNESIUM SERPL-MCNC: 2.1 MG/DL (ref 1.6–2.6)
MCH RBC QN AUTO: 32.1 PG (ref 26.8–34.3)
MCHC RBC AUTO-ENTMCNC: 32.5 G/DL (ref 31.4–37.4)
MCV RBC AUTO: 99 FL (ref 82–98)
MONOCYTES # BLD AUTO: 0.82 THOUSAND/ΜL (ref 0.17–1.22)
MONOCYTES NFR BLD AUTO: 11 % (ref 4–12)
NEUTROPHILS # BLD AUTO: 4.61 THOUSANDS/ΜL (ref 1.85–7.62)
NEUTS SEG NFR BLD AUTO: 61 % (ref 43–75)
NRBC BLD AUTO-RTO: 0 /100 WBCS
PLATELET # BLD AUTO: 370 THOUSANDS/UL (ref 149–390)
PMV BLD AUTO: 8.6 FL (ref 8.9–12.7)
POTASSIUM SERPL-SCNC: 3.7 MMOL/L (ref 3.5–5.3)
PROCALCITONIN SERPL-MCNC: 0.13 NG/ML
PROT SERPL-MCNC: 6 G/DL (ref 6.4–8.2)
RBC # BLD AUTO: 3.99 MILLION/UL (ref 3.81–5.12)
RSV B RNA SPEC QL NAA+PROBE: NOT DETECTED
SODIUM SERPL-SCNC: 141 MMOL/L (ref 136–145)
TRIGL SERPL-MCNC: 84 MG/DL
TROPONIN I SERPL-MCNC: <0.02 NG/ML
WBC # BLD AUTO: 7.65 THOUSAND/UL (ref 4.31–10.16)

## 2019-02-13 PROCEDURE — 83735 ASSAY OF MAGNESIUM: CPT | Performed by: FAMILY MEDICINE

## 2019-02-13 PROCEDURE — 84484 ASSAY OF TROPONIN QUANT: CPT | Performed by: NURSE PRACTITIONER

## 2019-02-13 PROCEDURE — 80053 COMPREHEN METABOLIC PANEL: CPT | Performed by: FAMILY MEDICINE

## 2019-02-13 PROCEDURE — 80061 LIPID PANEL: CPT | Performed by: FAMILY MEDICINE

## 2019-02-13 PROCEDURE — A9585 GADOBUTROL INJECTION: HCPCS | Performed by: FAMILY MEDICINE

## 2019-02-13 PROCEDURE — 72158 MRI LUMBAR SPINE W/O & W/DYE: CPT

## 2019-02-13 PROCEDURE — 99232 SBSQ HOSP IP/OBS MODERATE 35: CPT | Performed by: FAMILY MEDICINE

## 2019-02-13 PROCEDURE — 85025 COMPLETE CBC W/AUTO DIFF WBC: CPT | Performed by: FAMILY MEDICINE

## 2019-02-13 PROCEDURE — 93306 TTE W/DOPPLER COMPLETE: CPT | Performed by: INTERNAL MEDICINE

## 2019-02-13 PROCEDURE — 84145 PROCALCITONIN (PCT): CPT | Performed by: FAMILY MEDICINE

## 2019-02-13 RX ORDER — OXYCODONE HYDROCHLORIDE 5 MG/1
5 TABLET ORAL ONCE
Status: COMPLETED | OUTPATIENT
Start: 2019-02-13 | End: 2019-02-13

## 2019-02-13 RX ORDER — OXYCODONE HYDROCHLORIDE 5 MG/1
5 TABLET ORAL EVERY 6 HOURS PRN
Status: DISCONTINUED | OUTPATIENT
Start: 2019-02-13 | End: 2019-02-14 | Stop reason: HOSPADM

## 2019-02-13 RX ORDER — BUTALBITAL, ACETAMINOPHEN AND CAFFEINE 50; 325; 40 MG/1; MG/1; MG/1
1 TABLET ORAL EVERY 8 HOURS PRN
Status: DISCONTINUED | OUTPATIENT
Start: 2019-02-13 | End: 2019-02-14 | Stop reason: HOSPADM

## 2019-02-13 RX ORDER — HYDROMORPHONE HCL/PF 1 MG/ML
0.5 SYRINGE (ML) INJECTION EVERY 4 HOURS PRN
Status: DISCONTINUED | OUTPATIENT
Start: 2019-02-13 | End: 2019-02-14 | Stop reason: HOSPADM

## 2019-02-13 RX ADMIN — OXYCODONE HYDROCHLORIDE 5 MG: 5 TABLET ORAL at 23:28

## 2019-02-13 RX ADMIN — BUTALBITAL, ACETAMINOPHEN AND CAFFEINE 1 TABLET: 50; 325; 40 TABLET ORAL at 18:15

## 2019-02-13 RX ADMIN — CLOTRIMAZOLE 10 MG: 10 LOZENGE ORAL at 20:07

## 2019-02-13 RX ADMIN — VANCOMYCIN HYDROCHLORIDE 1000 MG: 1 INJECTION, POWDER, LYOPHILIZED, FOR SOLUTION INTRAVENOUS at 14:25

## 2019-02-13 RX ADMIN — LORAZEPAM 1 MG: 2 INJECTION, SOLUTION INTRAMUSCULAR; INTRAVENOUS at 17:47

## 2019-02-13 RX ADMIN — LEVOTHYROXINE SODIUM 50 MCG: 50 TABLET ORAL at 06:19

## 2019-02-13 RX ADMIN — PANTOPRAZOLE SODIUM 40 MG: 40 TABLET, DELAYED RELEASE ORAL at 06:19

## 2019-02-13 RX ADMIN — ENOXAPARIN SODIUM 40 MG: 40 INJECTION SUBCUTANEOUS at 09:18

## 2019-02-13 RX ADMIN — CLOTRIMAZOLE 10 MG: 10 LOZENGE ORAL at 09:18

## 2019-02-13 RX ADMIN — ONDANSETRON 4 MG: 2 INJECTION INTRAMUSCULAR; INTRAVENOUS at 17:46

## 2019-02-13 RX ADMIN — FAMOTIDINE 20 MG: 20 TABLET ORAL at 17:45

## 2019-02-13 RX ADMIN — SODIUM CHLORIDE 125 ML/HR: 0.9 INJECTION, SOLUTION INTRAVENOUS at 04:43

## 2019-02-13 RX ADMIN — FAMOTIDINE 20 MG: 20 TABLET ORAL at 09:18

## 2019-02-13 RX ADMIN — PANTOPRAZOLE SODIUM 40 MG: 40 TABLET, DELAYED RELEASE ORAL at 17:45

## 2019-02-13 RX ADMIN — CLOTRIMAZOLE 10 MG: 10 LOZENGE ORAL at 17:45

## 2019-02-13 RX ADMIN — OXYCODONE HYDROCHLORIDE 5 MG: 5 TABLET ORAL at 10:17

## 2019-02-13 RX ADMIN — SODIUM CHLORIDE 125 ML/HR: 0.9 INJECTION, SOLUTION INTRAVENOUS at 18:15

## 2019-02-13 RX ADMIN — LIDOCAINE 1 PATCH: 50 PATCH TOPICAL at 09:18

## 2019-02-13 RX ADMIN — MONTELUKAST SODIUM 10 MG: 10 TABLET, FILM COATED ORAL at 17:45

## 2019-02-13 RX ADMIN — TEMAZEPAM 15 MG: 15 CAPSULE ORAL at 21:40

## 2019-02-13 RX ADMIN — HYDROMORPHONE HYDROCHLORIDE 0.5 MG: 1 INJECTION, SOLUTION INTRAMUSCULAR; INTRAVENOUS; SUBCUTANEOUS at 14:28

## 2019-02-13 RX ADMIN — MONTELUKAST SODIUM 10 MG: 10 TABLET, FILM COATED ORAL at 09:18

## 2019-02-13 RX ADMIN — VANCOMYCIN HYDROCHLORIDE 1000 MG: 1 INJECTION, POWDER, LYOPHILIZED, FOR SOLUTION INTRAVENOUS at 21:39

## 2019-02-13 RX ADMIN — LEVOFLOXACIN 750 MG: 5 INJECTION, SOLUTION INTRAVENOUS at 20:07

## 2019-02-13 RX ADMIN — GADOBUTROL 6 ML: 604.72 INJECTION INTRAVENOUS at 14:01

## 2019-02-13 NOTE — PROGRESS NOTES
Vancomycin Assessment    Otis Drew is a 37 y o  female who is currently receiving vancomycin 1250mg Q12h for other Sepsis   Relevant clinical data and objective history reviewed:  Creatinine   Date Value Ref Range Status   02/13/2019 0 60 0 60 - 1 30 mg/dL Final     Comment:     Standardized to IDMS reference method   02/12/2019 0 78 0 60 - 1 30 mg/dL Final     Comment:     Standardized to IDMS reference method   08/28/2018 0 60 0 60 - 1 30 mg/dL Final     Comment:     Standardized to IDMS reference method   01/16/2015 0 67 0 60 - 1 30 mg/dL Final     Comment:     Standardized to IDMS reference method   01/13/2015 0 59 (L) 0 60 - 1 30 mg/dL Final     Comment:     Standardized to IDMS reference method   01/12/2015 0 61 0 60 - 1 30 mg/dL Final     Comment:     Standardized to IDMS reference method     BP 92/56 (BP Location: Right arm)   Pulse 76   Temp 97 7 °F (36 5 °C) (Temporal)   Resp 18   Ht 5' 5" (1 651 m)   Wt 68 7 kg (151 lb 7 3 oz)   SpO2 97%   BMI 25 20 kg/m²   I/O last 3 completed shifts: In: 4948 3 [P O :180; I V :1783 3; IV Piggyback:2985]  Out: -   Lab Results   Component Value Date/Time    BUN 7 02/13/2019 07:30 AM    BUN 10 01/16/2015 12:13 PM    WBC 7 65 02/13/2019 07:30 AM    WBC 6 22 06/12/2015 12:28 PM    HGB 12 8 02/13/2019 07:30 AM    HGB 15 2 06/12/2015 12:28 PM    HCT 39 4 02/13/2019 07:30 AM    HCT 43 2 06/12/2015 12:28 PM    MCV 99 (H) 02/13/2019 07:30 AM    MCV 94 06/12/2015 12:28 PM     02/13/2019 07:30 AM     06/12/2015 12:28 PM     Temp Readings from Last 3 Encounters:   02/13/19 97 7 °F (36 5 °C) (Temporal)   09/05/18 98 7 °F (37 1 °C) (Tympanic)   08/28/18 98 °F (36 7 °C) (Temporal)     Vancomycin Days of Therapy: 2    Assessment/Plan  The patient is currently on vancomycin utilizing scheduled dosing based on actual body weight  Baseline risks associated with therapy include: dehydration    The patient is currently receiving 1250mg Q12h and after clinical evaluation will be changed to 1000mg Q8h  Pharmacy will also follow closely for s/sx of nephrotoxicity, infusion reactions and appropriateness of therapy  BMP and CBC will be ordered per protocol  Plan for trough as patient approaches steady state, prior to the 4th  dose at approximately 1000 on 2/14/19  Due to infection severity, will target a trough of 15-20 (appropriate for most indications)   Pharmacy will continue to follow the patient?s culture results and clinical progress daily      Rhys Plascencia, Pharmacist

## 2019-02-13 NOTE — PROGRESS NOTES
Progress Note - Nydia Nettles 1975, 37 y o  female MRN: [id number]    Unit/Bed#: 413-01 Encounter: [id number]    Primary Care Provider: Nina Sheikh PA-C   Date and time admitted to hospital: 2/12/2019  6:33 AM        * SIRS (systemic inflammatory response syndrome) (HCC)  Assessment & Plan  WBC count and tachycardia  No obvious source of infection other than oral thrush   Check blood cultures x 2   pro calcitonin now and in am  UA normal  CT chest abdomen pelvis without focus of infection   Leukocytosis, tachycardia resolved  No further febrile episodes  Will obtain MRI of the lumbar spine  Continue vancomycin and Levaquin  Follow up blood cultures and procalcitonin    Thrush  Assessment & Plan  Nystatin swish and swallow  Oral Diflucan 200 mg    Palpitation  Assessment & Plan  Ongoing for past 4 weeks  Trend troponin x 2  2D echo  Fasting lipid panel  Hemoglobin a1c     Chronic back pain  Assessment & Plan  Follows with [facility]   Had MRI 5 days ago and was told it's ok other than discs  I can not view results through care everywhere but see the MRI   Supportive care PT/OT and lidoderm patch   Given no other focus of infection will obtain an MRI of the lumbar spine with IV contrast 2/13/19    Mast cell disorder  Assessment & Plan  Patient is established at [facility]  Outpatient follow-up    CHF (congestive heart failure) ([facility] [address] )  Assessment & Plan  Echocardiogram in 2017 was normal  Repeat 2D echocardiogram pending    Ulcerative colitis [facility])  Assessment & Plan  Patient receives Remicade every 6 weeks  Most recent infusion was 4 weeks ago  [facility]        Progress Note - Nydia Nettles 37 y o  female MRN: [id number]    Unit/Bed#: 413-01 Encounter: [id number]        Subjective:   Patient seen and examined at bedside  No acute complaints or events overnight other than back pain  She states she feels better     Objective:     Vitals:   Vitals:    02/13/19 0755   BP: 92/56 Pulse: 76   Resp: 18   Temp: 97 7 °F (36 5 °C)   SpO2: 97%     Body mass index is 25 2 kg/m²      Intake/Output Summary (Last 24 hours) at 2/13/2019 1051  Last data filed at 2/13/2019 0850  Gross per 24 hour   Intake 3068 33 ml   Output --   Net 3068 33 ml       Physical Exam:   BP 92/56 (BP Location: Right arm)   Pulse 76   Temp 97 7 °F (36 5 °C) (Temporal)   Resp 18   Ht 5' 5" (1 651 m)   Wt 68 7 kg (151 lb 7 3 oz)   SpO2 97%   BMI 25 20 kg/m²   General appearance: alert and oriented, in no acute distress  Head: Normocephalic, without obvious abnormality, atraumatic  Lungs: clear to auscultation bilaterally  Heart: regular rate and rhythm, S1, S2 normal, no murmur, click, rub or gallop  Abdomen: soft, non-tender; bowel sounds normal; no masses,  no organomegaly   Back: point tenderness of L4-L5 region  Extremities: extremities normal, warm and well-perfused; no cyanosis, clubbing, or edema  Pulses: 2+ and symmetric  Neurologic: Grossly normal     Invasive Devices     Peripheral Intravenous Line            Peripheral IV 02/13/19 Left Forearm less than 1 day                Results from last 7 days   Lab Units 02/13/19  0730 02/12/19  1747 02/12/19  0649   WBC Thousand/uL 7 65  --  17 17*   HEMOGLOBIN g/dL 12 8  --  15 0   HEMATOCRIT % 39 4  --  44 9   PLATELETS Thousands/uL 370 367 395*       Results from last 7 days   Lab Units 02/13/19  0730 02/12/19  0649   POTASSIUM mmol/L 3 7 4 3   CHLORIDE mmol/L 106 101   CO2 mmol/L 28 26   BUN mg/dL 7 13   CREATININE mg/dL 0 60 0 78   CALCIUM mg/dL 7 8* 8 6   ALK PHOS U/L 42* 51   ALT U/L 40 29   AST U/L 24 36       Medication Administration - last 24 hours from 02/12/2019 1051 to 02/13/2019 1051       Date/Time Order Dose Route Action Action by     02/12/2019 1109 HYDROmorphone (DILAUDID) injection 1 mg 1 mg Intravenous Given Sumi Diaz RN     02/13/2019 0443 sodium chloride 0 9 % infusion 125 mL/hr Intravenous 1000 [facility] RN     02/12/2019 1427 sodium chloride 0 9 % infusion 125 mL/hr Intravenous New Bag [facility], RN     02/12/2019 1205 sodium chloride 0 9 % infusion 0 mL/hr Intravenous Hold [facility], RN     02/12/2019 1414 thiamine (VITAMIN B1) 100 mg in sodium chloride 0 9 % 50 mL IVPB 100 mg Intravenous [facility]     02/13/2019 3631 levothyroxine tablet 50 mcg 50 mcg Oral Given [facility], RN     02/12/2019 1413 levothyroxine tablet 50 mcg 50 mcg Oral Given [facility], RN     02/13/2019 0918 montelukast (SINGULAIR) tablet 10 mg 10 mg Oral Given [facility], RN     02/12/2019 1737 montelukast (SINGULAIR) tablet 10 mg 10 mg Oral Given [facility], RN     02/13/2019 0619 pantoprazole (PROTONIX) EC tablet 40 mg 40 mg Oral Given [facility], RN     02/12/2019 1737 pantoprazole (PROTONIX) EC tablet 40 mg 40 mg Oral Given [facility], RN     02/12/2019 2136 temazepam (RESTORIL) capsule 15 mg 15 mg Oral Given Jamesetta Northern, RN     02/13/2019 4617 famotidine (PEPCID) tablet 20 mg 20 mg Oral Given [facility], RN     02/12/2019 1737 famotidine (PEPCID) tablet 20 mg 20 mg Oral Given [facility], RN     02/12/2019 1432 nystatin (MYCOSTATIN) oral suspension 500,000 Units 500,000 Units Swish & Swallow Not Given Nadine Raymundo, RN     02/12/2019 1413 fluconazole (DIFLUCAN) tablet 200 mg 200 mg Oral Given [facility], RN     02/13/2019 0918 enoxaparin (LOVENOX) subcutaneous injection 40 mg 40 mg Subcutaneous Given [facility], RN     02/12/2019 1413 enoxaparin (LOVENOX) subcutaneous injection 40 mg 40 mg Subcutaneous Given [facility], RN     02/13/2019 0918 lidocaine (LIDODERM) 5 % patch 1 patch 1 patch Topical Medication Applied [facility], RN     02/12/2019 2353 lidocaine (LIDODERM) 5 % patch 1 patch 1 patch Topical Patch Removed [facility], RN     02/12/2019 1252 lidocaine (LIDODERM) 5 % patch 1 patch 1 patch Topical Medication Applied Nadine Raymundo RN     02/13/2019 0918 clotrimazole (Dolly Win) jamari 10 mg 10 mg Oral Given Sharita Hancock RN     02/12/2019 2136 clotrimazole [facility]) jamari 10 mg 10 mg Oral Given Nazario Rojas [facility]     02/12/2019 1738 clotrimazole (MYCELEX) jamari 10 mg 10 mg Oral Given Jaxon Puckett RN     02/12/2019 1418 sodium chloride 0 9 % bolus 1,000 mL 0 mL Intravenous Stopped Angelika Souza RN     02/12/2019 1245 sodium chloride 0 9 % bolus 1,000 mL 1,000 mL Intravenous Not Given Jaxon Puckett RN     02/12/2019 1213 sodium chloride 0 9 % bolus 1,000 mL   Intravenous Not Given Jaxon Puckett RN     02/12/2019 1210 sodium chloride 0 9 % bolus 1,000 mL   Intravenous Rate/Dose Change Marlys Reyes RN     02/12/2019 1951 LORazepam (ATIVAN) 2 mg/mL injection 1 mg 1 mg Intravenous Given Nazario Rojas RN     02/12/2019 1855 acetaminophen (TYLENOL) tablet 650 mg 650 mg Oral Given Angelika Souza RN     02/12/2019 1946 levofloxacin (LEVAQUIN) IVPB (premix) 750 mg 750 mg Intravenous 6200 [facility], [facility]     02/13/2019 2481 vancomycin (VANCOCIN) IVPB (premix) 1,000 mg 1,000 mg Intravenous Not Given Sharita Hancock RN     02/13/2019 1009 vancomycin (VANCOCIN) 1,250 mg in sodium chloride 0 9 % 250 mL IVPB 1,250 mg Intravenous Not Given Sharita Hancock RN     02/12/2019 2136 vancomycin (VANCOCIN) 1,250 mg in sodium chloride 0 9 % 250 mL IVPB 1,250 mg Intravenous New [address] [facility], [facility]     02/13/2019 1017 oxyCODONE (ROXICODONE) IR tablet 5 mg 5 mg Oral Given Sharita Hancock RN            Lab, Imaging and other studies: I have personally reviewed pertinent reports      VTE Pharmacologic Prophylaxis: Enoxaparin (Lovenox)  VTE Mechanical Prophylaxis: sequential compression device     Abad Lazo MD  2/13/2019,10:51 AM

## 2019-02-13 NOTE — PLAN OF CARE
Problem: Potential for Falls  Goal: Patient will remain free of falls  Description  INTERVENTIONS:  - Assess patient frequently for physical needs  -  Identify cognitive and physical deficits and behaviors that affect risk of falls  -  [place] fall precautions as indicated by assessment   Medium fall risk   - Educate patient/family on patient safety including physical limitations  - Instruct patient to call for assistance with activity based on assessment  - Modify environment to reduce risk of injury  - Consider OT/PT consult to assist with strengthening/mobility   Outcome: Progressing     Problem: PAIN - ADULT  Goal: Verbalizes/displays adequate comfort level or baseline comfort level  Description  Interventions:  - Encourage patient to monitor pain and request assistance  - Assess pain using appropriate pain scale; 0-10 pain scale  - Administer analgesics based on type and severity of pain and evaluate response  - Implement non-pharmacological measures as appropriate and evaluate response  - Consider cultural and social influences on pain and pain management  - Notify physician/advanced practitioner if interventions unsuccessful or patient reports new pain   Outcome: Progressing     Problem: INFECTION - ADULT  Goal: Absence or prevention of progression during hospitalization  Description  INTERVENTIONS:  - Assess and monitor for signs and symptoms of infection  - Monitor lab/diagnostic results  - Monitor all insertion sites, i e  indwelling lines, tubes, and drains  - [place] appropriate cooling/warming therapies per order  - Administer medications as ordered  - Instruct and encourage patient and family to use good hand hygiene technique  - Identify and instruct in appropriate isolation precautions for identified infection/condition  - Droplet precautions   Outcome: Progressing     Problem: SAFETY ADULT  Goal: Maintain or return to baseline ADL function  Description  INTERVENTIONS:  -  Assess patient's ability to carry out ADLs; assess patient's baseline for ADL function and identify physical deficits which impact ability to perform ADLs Independent  - Assess range of motion  - Assess patient's mobility; Independent  - Encourage maximum independence but intervene and supervise when necessary  ¯ Assess for home care needs following discharge   ¯ Request OT consult to assist with ADL evaluation and planning for discharge  ¯ Provide patient education as appropriate   Outcome: Progressing  Goal: Maintain or return mobility status to optimal level  Description  INTERVENTIONS:  - Assess patient's baseline mobility status [address] fall precautions as indicated by assessment  Medium fall risk   - Record patient progress and toleration of activity level on Mobility SBAR; progress patient to next Phase/Stage  - Instruct patient to call for assistance with activity based on assessment  - Request Rehabilitation consult to assist with strengthening/weightbearing, etc    Outcome: Progressing     Problem: DISCHARGE PLANNING  Goal: Discharge to home or other facility with appropriate resources  Description  INTERVENTIONS:  - Identify barriers to discharge w/patient and caregiver  - Arrange for needed discharge resources and transportation as appropriate  - Identify discharge learning needs (meds, wound care, etc )  - Refer to Case Management Department for coordinating discharge planning if the patient needs post-hospital services based on physician/advanced practitioner order or complex needs related to functional status, cognitive ability, or social support system    Outcome: Progressing     Problem: Knowledge Deficit  Goal: Patient/family/caregiver demonstrates understanding of disease process, treatment plan, medications, and discharge instructions  Description  Complete learning assessment and assess knowledge base    Interventions:  - Provide teaching at level of understanding  - Provide teaching via preferred learning methods  Outcome: Progressing     Problem: DISCHARGE PLANNING - CARE MANAGEMENT  Goal: Discharge to post-acute care or home with appropriate resources  Description  INTERVENTIONS:  - Conduct assessment to determine patient/family and health care team treatment goals, and need for post-acute services based on payer coverage, community resources, and patient preferences, and barriers to discharge  - Address psychosocial, clinical, and financial barriers to discharge as identified in assessment in conjunction with the patient/family and health care team  - Arrange appropriate level of post-acute services according to patient's   needs and preference and payer coverage in collaboration with the physician and health care team  - Communicate with and update the patient/family, physician, and health care team regarding progress on the discharge plan  - Arrange appropriate transportation to post-acute venues   Outcome: Progressing

## 2019-02-13 NOTE — SOCIAL WORK
Cm met with the patient to evaluate the patients prior function and living situation and any barriers to d/c and form a safe d/c plan  Cm also evaluated the patient for any services in the home or needs for services  Pt resides at home with her  and is independent with her adls and ambulation  No services or DME  PCP is Karan He and [facility] is [facility]  Plans at this time are home on dc with outpatient follow up  Cm will follow and assist in dc planning  Patient/caregiver received discharge checklist   Content reviewed  Patient/caregiver encouraged to participate in discharge plan of care prior to discharge home

## 2019-02-13 NOTE — PROGRESS NOTES
The patient developed a fever, hypotension, and was found to have an elevated procalcitonin level  I will initiate empiric antibiotics  I will not utilize cefepime with the patient having a history of anaphylaxis allergy to penicillins  Initiate IV levofloxacin and IV vancomycin

## 2019-02-14 VITALS
HEART RATE: 77 BPM | DIASTOLIC BLOOD PRESSURE: 66 MMHG | OXYGEN SATURATION: 99 % | RESPIRATION RATE: 18 BRPM | TEMPERATURE: 98 F | WEIGHT: 151.46 LBS | BODY MASS INDEX: 25.23 KG/M2 | HEIGHT: 65 IN | SYSTOLIC BLOOD PRESSURE: 98 MMHG

## 2019-02-14 LAB
ALBUMIN SERPL BCP-MCNC: 3 G/DL (ref 3.5–5)
ALP SERPL-CCNC: 38 U/L (ref 46–116)
ALT SERPL W P-5'-P-CCNC: 38 U/L (ref 12–78)
ANION GAP SERPL CALCULATED.3IONS-SCNC: 7 MMOL/L (ref 4–13)
AST SERPL W P-5'-P-CCNC: 19 U/L (ref 5–45)
BASOPHILS # BLD AUTO: 0.06 THOUSANDS/ΜL (ref 0–0.1)
BASOPHILS NFR BLD AUTO: 1 % (ref 0–1)
BILIRUB SERPL-MCNC: 0.5 MG/DL (ref 0.2–1)
BUN SERPL-MCNC: 4 MG/DL (ref 5–25)
CALCIUM SERPL-MCNC: 8.1 MG/DL (ref 8.3–10.1)
CHLORIDE SERPL-SCNC: 106 MMOL/L (ref 100–108)
CK SERPL-CCNC: 28 U/L (ref 26–192)
CO2 SERPL-SCNC: 28 MMOL/L (ref 21–32)
CREAT SERPL-MCNC: 0.7 MG/DL (ref 0.6–1.3)
EOSINOPHIL # BLD AUTO: 0.2 THOUSAND/ΜL (ref 0–0.61)
EOSINOPHIL NFR BLD AUTO: 3 % (ref 0–6)
ERYTHROCYTE [DISTWIDTH] IN BLOOD BY AUTOMATED COUNT: 13.4 % (ref 11.6–15.1)
GFR SERPL CREATININE-BSD FRML MDRD: 105 ML/MIN/1.73SQ M
GLUCOSE SERPL-MCNC: 77 MG/DL (ref 65–140)
HCT VFR BLD AUTO: 40.9 % (ref 34.8–46.1)
HGB BLD-MCNC: 13.6 G/DL (ref 11.5–15.4)
IMM GRANULOCYTES # BLD AUTO: 0.03 THOUSAND/UL (ref 0–0.2)
IMM GRANULOCYTES NFR BLD AUTO: 1 % (ref 0–2)
LACTATE SERPL-SCNC: 0.6 MMOL/L (ref 0.5–2)
LYMPHOCYTES # BLD AUTO: 3.12 THOUSANDS/ΜL (ref 0.6–4.47)
LYMPHOCYTES NFR BLD AUTO: 49 % (ref 14–44)
MAGNESIUM SERPL-MCNC: 2 MG/DL (ref 1.6–2.6)
MCH RBC QN AUTO: 32.9 PG (ref 26.8–34.3)
MCHC RBC AUTO-ENTMCNC: 33.3 G/DL (ref 31.4–37.4)
MCV RBC AUTO: 99 FL (ref 82–98)
MONOCYTES # BLD AUTO: 0.78 THOUSAND/ΜL (ref 0.17–1.22)
MONOCYTES NFR BLD AUTO: 12 % (ref 4–12)
NEUTROPHILS # BLD AUTO: 2.14 THOUSANDS/ΜL (ref 1.85–7.62)
NEUTS SEG NFR BLD AUTO: 34 % (ref 43–75)
NRBC BLD AUTO-RTO: 0 /100 WBCS
PLATELET # BLD AUTO: 386 THOUSANDS/UL (ref 149–390)
PMV BLD AUTO: 8.4 FL (ref 8.9–12.7)
POTASSIUM SERPL-SCNC: 3.7 MMOL/L (ref 3.5–5.3)
PROCALCITONIN SERPL-MCNC: <0.05 NG/ML
PROT SERPL-MCNC: 6.4 G/DL (ref 6.4–8.2)
RBC # BLD AUTO: 4.13 MILLION/UL (ref 3.81–5.12)
SODIUM SERPL-SCNC: 141 MMOL/L (ref 136–145)
WBC # BLD AUTO: 6.33 THOUSAND/UL (ref 4.31–10.16)

## 2019-02-14 PROCEDURE — 85025 COMPLETE CBC W/AUTO DIFF WBC: CPT | Performed by: FAMILY MEDICINE

## 2019-02-14 PROCEDURE — 84145 PROCALCITONIN (PCT): CPT | Performed by: FAMILY MEDICINE

## 2019-02-14 PROCEDURE — 83605 ASSAY OF LACTIC ACID: CPT | Performed by: FAMILY MEDICINE

## 2019-02-14 PROCEDURE — 99239 HOSP IP/OBS DSCHRG MGMT >30: CPT | Performed by: FAMILY MEDICINE

## 2019-02-14 PROCEDURE — 80053 COMPREHEN METABOLIC PANEL: CPT | Performed by: FAMILY MEDICINE

## 2019-02-14 PROCEDURE — 82550 ASSAY OF CK (CPK): CPT | Performed by: FAMILY MEDICINE

## 2019-02-14 PROCEDURE — 83735 ASSAY OF MAGNESIUM: CPT | Performed by: FAMILY MEDICINE

## 2019-02-14 RX ORDER — BUTALBITAL, ACETAMINOPHEN AND CAFFEINE 50; 325; 40 MG/1; MG/1; MG/1
1 TABLET ORAL EVERY 8 HOURS PRN
Qty: 20 TABLET | Refills: 0 | Status: SHIPPED | OUTPATIENT
Start: 2019-02-14 | End: 2020-11-03 | Stop reason: SDUPTHER

## 2019-02-14 RX ORDER — TRAMADOL HYDROCHLORIDE 50 MG/1
50 TABLET ORAL EVERY 6 HOURS PRN
Qty: 10 TABLET | Refills: 0 | Status: SHIPPED | OUTPATIENT
Start: 2019-02-14 | End: 2020-08-03

## 2019-02-14 RX ORDER — LEVOFLOXACIN 750 MG/1
750 TABLET ORAL EVERY 24 HOURS
Qty: 3 TABLET | Refills: 0 | Status: SHIPPED | OUTPATIENT
Start: 2019-02-14 | End: 2019-02-17

## 2019-02-14 RX ADMIN — CLOTRIMAZOLE 10 MG: 10 LOZENGE ORAL at 09:52

## 2019-02-14 RX ADMIN — LIDOCAINE 1 PATCH: 50 PATCH TOPICAL at 09:52

## 2019-02-14 RX ADMIN — HYDROMORPHONE HYDROCHLORIDE 0.5 MG: 1 INJECTION, SOLUTION INTRAMUSCULAR; INTRAVENOUS; SUBCUTANEOUS at 11:26

## 2019-02-14 RX ADMIN — BUTALBITAL, ACETAMINOPHEN AND CAFFEINE 1 TABLET: 50; 325; 40 TABLET ORAL at 05:52

## 2019-02-14 RX ADMIN — MONTELUKAST SODIUM 10 MG: 10 TABLET, FILM COATED ORAL at 09:52

## 2019-02-14 RX ADMIN — OXYCODONE HYDROCHLORIDE 5 MG: 5 TABLET ORAL at 09:52

## 2019-02-14 RX ADMIN — LORAZEPAM 1 MG: 2 INJECTION, SOLUTION INTRAMUSCULAR; INTRAVENOUS at 00:38

## 2019-02-14 RX ADMIN — ONDANSETRON 4 MG: 2 INJECTION INTRAMUSCULAR; INTRAVENOUS at 11:26

## 2019-02-14 RX ADMIN — PANTOPRAZOLE SODIUM 40 MG: 40 TABLET, DELAYED RELEASE ORAL at 06:01

## 2019-02-14 RX ADMIN — SODIUM CHLORIDE 125 ML/HR: 0.9 INJECTION, SOLUTION INTRAVENOUS at 05:53

## 2019-02-14 RX ADMIN — LEVOTHYROXINE SODIUM 50 MCG: 50 TABLET ORAL at 05:53

## 2019-02-14 RX ADMIN — ONDANSETRON 4 MG: 2 INJECTION INTRAMUSCULAR; INTRAVENOUS at 05:53

## 2019-02-14 RX ADMIN — ENOXAPARIN SODIUM 40 MG: 40 INJECTION SUBCUTANEOUS at 09:58

## 2019-02-14 RX ADMIN — VANCOMYCIN HYDROCHLORIDE 1000 MG: 1 INJECTION, POWDER, LYOPHILIZED, FOR SOLUTION INTRAVENOUS at 05:53

## 2019-02-14 RX ADMIN — FAMOTIDINE 20 MG: 20 TABLET ORAL at 09:52

## 2019-02-14 NOTE — DISCHARGE SUMMARY
Discharge Summary - Nate Lazo 37 y o  female MRN: [id number]    Unit/Bed#: 432-06 Encounter: [id number]    Admission Date:   Admission Orders (From admission, onward)    Ordered        02/13/19 1339  Inpatient Admission  Once     Order ID Start Status   [id number] 02/13/19 1340 Completed          02/12/19 1101  Place in Observation  Once     Order ID Start Status   [id number] 02/12/19 1102 Completed                Admitting Diagnosis: Palpitations [R00 2]  Chest pain [R07 9]  Intractable pain [R52]  Intractable nausea and vomiting [R11 2]    HPI:         Procedures Performed:   Orders Placed This Encounter   Procedures    ED ECG Documentation Only       Summary of Hospital Course:   SIRS   Oral Thrush   Patient had an elevatd WBC count on admission which was initially attributed to recent prednisone therapy  She did have febrile episode of 100 4 shortly after admission  Blood cultures were obtained patient was empirically placed on Levaquin and vancomycin given she is on immunosuppressants in the form of Remicade  I administered Diflucan for the oral thrush along with nystatin swish and swallow  She underwent a CT chest abdomen pelvis without a focus of infection  Her only complaint was back pain an MRI with contrast was obtained to rule out diskitis  Results are discussed below  Ultimately her white blood cell count improved, she remained afebrile and I only noted some sinus congestion during examination  Patient will be discharged on Levaquin to complete 5 days of antibiotics  She will follow up with her PCP as needed  There was no evidence of oral thrush on discharge  Significant Findings, Care, Treatment and Services Provided:     MRI    1   No acute MRI findings   No evidence of discitis osteomyelitis  2   Mild degenerative changes without canal or foraminal stenosis at any level  CT   2   No acute findings in the abdomen    3   Moderate intrahepatic and extra hepatic biliary ductal dilation appear similar to prior   Unchanged prominence of the pancreatic duct   Recommend correlation with LFTs for obstructive pattern  4   Nonobstructing bilateral intrarenal calculi are unchanged  Complications: none    Discharge Diagnosis: see above    Resolved Problems  Date Reviewed: 10/5/2018    None          Condition at Discharge: fair         Discharge instructions/Information to patient and family:   See after visit summary for information provided to patient and family  Provisions for Follow-Up Care:  See after visit summary for information related to follow-up care and any pertinent home health orders  PCP: Maurice Deleon PA-C    Disposition: Home    Planned Readmission: No    Discharge Statement   I spent 40 minutes discharging the patient  This time was spent on the day of discharge  I had direct contact with the patient on the day of discharge  Additional documentation is required if more than 30 minutes were spent on discharge  Discharge Medications:  See after visit summary for reconciled discharge medications provided to patient and family

## 2019-02-14 NOTE — PLAN OF CARE
Problem: Potential for Falls  Goal: Patient will remain free of falls  Description  INTERVENTIONS:  - Assess patient frequently for physical needs  -  Identify cognitive and physical deficits and behaviors that affect risk of falls  -  [place] fall precautions as indicated by assessment   Medium fall risk   - Educate patient/family on patient safety including physical limitations  - Instruct patient to call for assistance with activity based on assessment  - Modify environment to reduce risk of injury  - Consider OT/PT consult to assist with strengthening/mobility   Outcome: Progressing     Problem: PAIN - ADULT  Goal: Verbalizes/displays adequate comfort level or baseline comfort level  Description  Interventions:  - Encourage patient to monitor pain and request assistance  - Assess pain using appropriate pain scale; 0-10 pain scale  - Administer analgesics based on type and severity of pain and evaluate response  - Implement non-pharmacological measures as appropriate and evaluate response  - Consider cultural and social influences on pain and pain management  - Notify physician/advanced practitioner if interventions unsuccessful or patient reports new pain   Outcome: Progressing     Problem: INFECTION - ADULT  Goal: Absence or prevention of progression during hospitalization  Description  INTERVENTIONS:  - Assess and monitor for signs and symptoms of infection  - Monitor lab/diagnostic results  - Monitor all insertion sites, i e  indwelling lines, tubes, and drains  - [place] appropriate cooling/warming therapies per order  - Administer medications as ordered  - Instruct and encourage patient and family to use good hand hygiene technique  - Identify and instruct in appropriate isolation precautions for identified infection/condition  - Droplet precautions   Outcome: Progressing     Problem: SAFETY ADULT  Goal: Maintain or return to baseline ADL function  Description  INTERVENTIONS:  -  Assess patient's ability to carry out ADLs; assess patient's baseline for ADL function and identify physical deficits which impact ability to perform ADLs Independent  - Assess range of motion  - Assess patient's mobility; Independent  - Encourage maximum independence but intervene and supervise when necessary  ¯ Assess for home care needs following discharge   ¯ Request OT consult to assist with ADL evaluation and planning for discharge  ¯ Provide patient education as appropriate   Outcome: Progressing  Goal: Maintain or return mobility status to optimal level  Description  INTERVENTIONS:  - Assess patient's baseline mobility status [address] fall precautions as indicated by assessment  Medium fall risk   - Record patient progress and toleration of activity level on Mobility SBAR; progress patient to next Phase/Stage  - Instruct patient to call for assistance with activity based on assessment  - Request Rehabilitation consult to assist with strengthening/weightbearing, etc    Outcome: Progressing     Problem: DISCHARGE PLANNING  Goal: Discharge to home or other facility with appropriate resources  Description  INTERVENTIONS:  - Identify barriers to discharge w/patient and caregiver  - Arrange for needed discharge resources and transportation as appropriate  - Identify discharge learning needs (meds, wound care, etc )  - Refer to Case Management Department for coordinating discharge planning if the patient needs post-hospital services based on physician/advanced practitioner order or complex needs related to functional status, cognitive ability, or social support system    Outcome: Progressing     Problem: Knowledge Deficit  Goal: Patient/family/caregiver demonstrates understanding of disease process, treatment plan, medications, and discharge instructions  Description  Complete learning assessment and assess knowledge base    Interventions:  - Provide teaching at level of understanding  - Provide teaching via preferred learning methods  Outcome: Progressing     Problem: DISCHARGE PLANNING - CARE MANAGEMENT  Goal: Discharge to post-acute care or home with appropriate resources  Description  INTERVENTIONS:  - Conduct assessment to determine patient/family and health care team treatment goals, and need for post-acute services based on payer coverage, community resources, and patient preferences, and barriers to discharge  - Address psychosocial, clinical, and financial barriers to discharge as identified in assessment in conjunction with the patient/family and health care team  - Arrange appropriate level of post-acute services according to patient's   needs and preference and payer coverage in collaboration with the physician and health care team  - Communicate with and update the patient/family, physician, and health care team regarding progress on the discharge plan  - Arrange appropriate transportation to post-acute venues   Outcome: Progressing     Problem: GASTROINTESTINAL - ADULT  Goal: Minimal or absence of nausea and/or vomiting  Description  INTERVENTIONS:  - Administer IV fluids as ordered to ensure adequate hydration  - Maintain NPO status until nausea and vomiting are resolved  - Administer ordered antiemetic medications as needed  - Provide nonpharmacologic comfort measures as appropriate  - Advance diet as tolerated, if ordered  - Nutrition services referral to assist patient with adequate nutrition and appropriate food choices   Outcome: Progressing

## 2019-02-14 NOTE — PROGRESS NOTES
Patient encouraged to get out of bed into chair for breakfast  Patient refusing at this time  Will re-attempt for lunch

## 2019-02-14 NOTE — NURSING NOTE
AVS reviewed with patient  Questions encouraged and answered as needed  Verbalizes understanding of instructions as presented  Discharged home in no acute distress

## 2019-02-14 NOTE — SOCIAL WORK
Discussed with patient preferences on discharge;understanding how to manage health at home; purpose of taking medications; importance of follow up care/appointments; and symptoms to watch out for once discharged home  Pt is being dc'd home on this date with no Cm needs  Pt will be calling to schedule any follow up appointments herself

## 2019-02-17 LAB
BACTERIA BLD CULT: NORMAL
BACTERIA BLD CULT: NORMAL

## 2019-02-19 NOTE — UTILIZATION REVIEW
Notification of Discharge  This is a Notification of Discharge from our facility [address]  Please be advised that this patient has been discharge from our facility  Below you will find the admission and discharge date and time including the patients disposition  PRESENTATION DATE: 2/12/2019  6:33 AM  IP ADMISSION DATE: 2/13/19 1339  DISCHARGE DATE: 2/14/2019  3:40 PM  DISPOSITION: [address] Utilization Review Department  Phone: [phone]; Fax [phone]  [email or web address]  ATTENTION: Please call with any questions or concerns to [phone]  and carefully listen to the prompts so that you are directed to the right person  Send all requests for admission clinical reviews, approved or denied determinations and any other requests to fax [phone]   All voicemails are confidential

## 2019-03-01 ENCOUNTER — TELEPHONE (OUTPATIENT)
Dept: FAMILY MEDICINE CLINIC | Facility: CLINIC | Age: 44
End: 2019-03-01

## 2019-03-08 ENCOUNTER — OFFICE VISIT (OUTPATIENT)
Dept: CARDIOLOGY CLINIC | Facility: HOSPITAL | Age: 44
End: 2019-03-08
Payer: COMMERCIAL

## 2019-03-08 VITALS
HEIGHT: 65 IN | HEART RATE: 78 BPM | SYSTOLIC BLOOD PRESSURE: 120 MMHG | WEIGHT: 142 LBS | BODY MASS INDEX: 23.66 KG/M2 | DIASTOLIC BLOOD PRESSURE: 76 MMHG

## 2019-03-08 DIAGNOSIS — I50.30 DIASTOLIC CONGESTIVE HEART FAILURE, UNSPECIFIED HF CHRONICITY (HCC): ICD-10-CM

## 2019-03-08 DIAGNOSIS — R00.2 PALPITATION: ICD-10-CM

## 2019-03-08 DIAGNOSIS — R00.1 BRADYCARDIA: Primary | ICD-10-CM

## 2019-03-08 PROCEDURE — 99214 OFFICE O/P EST MOD 30 MIN: CPT | Performed by: INTERNAL MEDICINE

## 2019-03-08 PROCEDURE — 93000 ELECTROCARDIOGRAM COMPLETE: CPT | Performed by: INTERNAL MEDICINE

## 2019-03-08 PROCEDURE — 1111F DSCHRG MED/CURRENT MED MERGE: CPT | Performed by: INTERNAL MEDICINE

## 2019-03-08 NOTE — PROGRESS NOTES
Cardiology Follow Up    Pollo Li  1975  [id number]  [address] [address]    1  Bradycardia     2  Palpitation  POCT ECG   3  Diastolic congestive heart failure, unspecified HF chronicity (HCC)         Discussion/Summary:  Overall from a cardiac standpoint she has been fairly stable  She continues to follow with the specialist at [facility] in [place] for her mast cell D granulation syndrome  She has had occasional palpitations that are short-lived no further workup at this point in time  Blood pressures been controlled  I will follow up with her in 1 year  Interval History:  47-year-old female I met in the hospital from iatrogenic fluid overload  Overall she has been doing well from a cardiac standpoint  We ordered a echocardiogram last year which showed normal diastolic parameters and stress echo showed no evidence of ischemia  She has 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  From a cardiac standpoint she has no anginal symptoms  She has no lower extremity edema, PND, orthopnea  His been no palpitations  Since her last visit she has been doing well  Has a good exercise tolerance with no exertional limitations  She occasionally gets mild palpitations when she has increased inflammation in her body  We have done a full workup with no significant abnormalities discovered    She denies any chest pain, shortness of breath, lightheadedness, dizziness, syncope    Problem List     Chondromalacia    Crohn's colitis ([address] )    Ulcerative colitis ([facility] )    Vitamin D deficiency    Hypokalemia    Hypotension    Throat tightness    Septic shock (HCC)    Bradycardia    Abdominal pain    Headache    Hypophosphatemia    Hypomagnesemia    Generalized anxiety disorder (Chronic)    Hypothyroidism (Chronic)    Low TSH level Hypocalcemia    Hyperglycemia    Acute respiratory failure with hypoxia (HCC)    Bilateral pleural effusion    Acute cardiogenic pulmonary edema (HCC)    Elevated troponin level    Abnormal CT of the abdomen    Leukocytosis    Arm swelling    Thrombophlebitis    Dysuria    Anxiety    CHF (congestive heart failure) (HCC)    Fibromyalgia    Mast cell disorder    Meniere disease    Visual disturbance    Chronic idiopathic urticaria    Disorder of synovium        Past Medical History:   Diagnosis Date    Anxiety     Asthma     Chronic kidney disease     Deep vein thrombosis (HCC)     Disease of thyroid gland     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    Generalized anxiety disorder 8/26/2017    Heart murmur     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[facility] )     Migraine     Myocardial infarction ([facility] )     NSTEMI  pt denies, documented in cardio note    Pancreatitis     sphinger of     Psychiatric disorder     RA (rheumatoid arthritis) ([facility] )     Ulcerative colitis ([facility] )      Social History     Socioeconomic History    Marital status: /Civil Union     Spouse name: Not on file    Number of children: Not on file    Years of education: Not on file    Highest education level: Not on file   Occupational History    Not on file   Social Needs    Financial resource strain: Not on file    Food insecurity:     Worry: Not on file     Inability: Not on file    Transportation needs:     Medical: Not on file     Non-medical: Not on file   Tobacco Use    Smoking status: Never Smoker    Smokeless tobacco: Never Used   Substance and Sexual Activity    Alcohol use: Never     Frequency: Never     Binge frequency: Never    Drug use: Yes     Types: Marijuana    Sexual activity: Yes     Partners: Male   Lifestyle    Physical activity:     Days per week: Not on file     Minutes per session: Not on file    Stress: Not on file   Relationships    Social connections:     Talks on phone: Not on file     Gets together: Not on file     Attends Baptism service: Not on file     Active member of club or organization: Not on file     Attends meetings of clubs or organizations: Not on file     Relationship status: Not on file    Intimate partner violence:     Fear of current or ex partner: Not on file     Emotionally abused: Not on file     Physically abused: Not on file     Forced sexual activity: Not on file   Other Topics Concern    Not on file   Social History Narrative    Not on file      History reviewed  No pertinent family history  Past Surgical History:   Procedure Laterality Date    APPENDECTOMY      CHOLECYSTECTOMY      EGD AND COLONOSCOPY N/A 9/12/2017    Procedure: EGD AND COLONOSCOPY;  Surgeon: Kevyn Covarrubias MD;  [facility]: [facility]; Service: Gastroenterology    ERCP N/A 9/15/2017    Procedure: ENDOSCOPIC RETROGRADE CHOLANGIOPANCREATOGRAPHY (ERCP); Surgeon: Hallie Ramirez MD;  [facility];   Service: Gastroenterology    HYSTERECTOMY      KNEE SURGERY Right     LAPAROSCOPIC ENDOMETRIOSIS FULGURATION      ORIF ANKLE FRACTURE Left     ND KNEE SCOPE,MED/LAT MENISECTOMY Left 5/12/2017    Procedure: POSSIBLE MEDIAL MENISECTOMY;  Surgeon: Jorge Mora MD;  Location: [facility];  Service: Orthopedics    ND KNEE [address] CART Left 5/12/2017    Procedure: KNEE ARTHROSCOPY EVALUATION, CHONDROPLASTY;  Surgeon: Jorge Mora MD;  [facility];  Service: Orthopedics    ND LAP,DIAGNOSTIC ABDOMEN N/A 12/12/2017    Procedure: LAPAROSCOPY DIAGNOSTIC  WITH LYSIS OF ADHESIONS;  Surgeon: David Castro DO;  [facility];  Service: General       Current Outpatient Medications:     budesonide (ENTOCORT EC) 3 MG capsule, TAKE 1 CAPSULE BY MOUTH 3 TIMES DAILY, Disp: 100 capsule, Rfl: 0    butalbital-acetaminophen-caffeine (FIORICET,ESGIC) -40 mg per tablet, Take 1 tablet by mouth every 8 (eight) hours as needed for headaches, Disp: 20 tablet, Rfl: 0    cetirizine (ZyrTEC) 10 mg tablet, Take 20 mg by mouth 2 (two) times a day as needed ("relief") , Disp: , Rfl:     cholecalciferol (VITAMIN D) 400 units/mL, Take 2 5 mL (1,000 Units total) by mouth daily, Disp: 1 Bottle, Rfl: 5    diphenhydrAMINE (BENADRYL) 25 mg tablet, Take 50 mg by mouth as needed for itching , Disp: , Rfl:     EPINEPHrine (EPIPEN 2-MARYSOL IJ), EpiPen  prn, Disp: , Rfl:     levothyroxine 75 mcg tablet, Take 50 mcg by mouth daily , Disp: , Rfl:     LORazepam (ATIVAN) 1 mg tablet, Take 1 tablet (1 mg total) by mouth every 6 (six) hours as needed for anxiety, Disp: 90 tablet, Rfl: 0    montelukast (SINGULAIR) 10 mg tablet, Take 10 mg by mouth 2 (two) times a day  , Disp: , Rfl:     omalizumab (XOLAIR) 150 mg, Inject 300 mg under the skin every 28 days , Disp: , Rfl:     pantoprazole (PROTONIX) 40 mg tablet, Take 40 mg by mouth 2 (two) times a day , Disp: , Rfl:     ranitidine (ZANTAC) 150 MG capsule, Take 300 mg by mouth 2 (two) times a day , Disp: , Rfl:     temazepam (RESTORIL) 30 mg capsule, Take 15 mg by mouth daily at bedtime, Disp: , Rfl:     traMADol (ULTRAM) 50 mg tablet, Take 1 tablet (50 mg total) by mouth every 6 (six) hours as needed for moderate pain or severe pain (Patient taking differently: Take 50 mg by mouth 2 (two) times a day ), Disp: 10 tablet, Rfl: 0    Current Facility-Administered Medications:     cyanocobalamin injection 1,000 mcg, 1,000 mcg, Intramuscular, Q30 Days, Herminia Torres PA-C, 1,000 mcg at 01/11/19 1148  Allergies   Allergen Reactions    Amitriptyline Anaphylaxis     According to the patient she had nphylaxis    Aspergillus Fumigatus Other (See Comments), Hives and Swelling    Azathioprine Other (See Comments) and Anaphylaxis     pancreatitis  According to patient she needed Epipen    Sumatriptan Anaphylaxis     Bradycardia, sob, back pressure, head pain,throat tightness  According to the patient she had anphylaxis    Contrast [Iodinated Diagnostic Agents]      Pt states needs to be premedicated prior to injection    Corn Dextrin      Any corn based products    Corn Oil Hives    Gelatin     Ibuprofen Hives and Throat Swelling    Malto Dextrin [Dextrin]     Other      Gel caps, maltodextrose, dextrose    Penicillins Hives and Throat Swelling    Penicillium Notatum     Sulfa Antibiotics Hives    Sulfate     Sulfites        Labs:     Chemistry        Component Value Date/Time     01/16/2015 1213    K 3 7 02/14/2019 0602    K 4 2 01/16/2015 1213     02/14/2019 0602     01/16/2015 1213    CO2 28 02/14/2019 0602    CO2 29 1 01/16/2015 1213    BUN 4 (L) 02/14/2019 0602    BUN 10 01/16/2015 1213    CREATININE 0 70 02/14/2019 0602    CREATININE 0 67 01/16/2015 1213        Component Value Date/Time    CALCIUM 8 1 (L) 02/14/2019 0602    CALCIUM 9 6 01/16/2015 1213    ALKPHOS 38 (L) 02/14/2019 0602    ALKPHOS 97 01/16/2015 1213    AST 19 02/14/2019 0602    AST 11 01/16/2015 1213    ALT 38 02/14/2019 0602    ALT 51 01/16/2015 1213    BILITOT 0 7 01/16/2015 1213            No results found for: CHOL  Lab Results   Component Value Date    HDL 54 02/13/2019    HDL 61 (H) 12/06/2017     Lab Results   Component Value Date    LDLCALC 62 02/13/2019    LDLCALC 79 12/06/2017     Lab Results   Component Value Date    TRIG 84 02/13/2019    TRIG 45 12/06/2017     No results found for: CHOLHDL    Imaging: No results found  ECG:  Normal sinus rhythm unremarkable tracing      ROS    Vitals:    03/08/19 1301   BP: 120/76   Pulse: 78     Vitals:    03/08/19 1301   Weight: 64 4 kg (142 lb)     Height: 5' 5" (165 1 cm)   Body mass index is 23 63 kg/m²      Physical Exam:   General appearance:  Appears stated age, alert, well appearing and in no distress  HEENT:  PERRLA, EOMI, no scleral icterus, no conjunctival pallor  NECK:  Supple, No elevated JVP, no thyromegaly, no carotid bruits  HEART:  Regular rate and rhythm, normal S1/S2, no S3/S4, no murmur or rub  LUNGS:  Clear to auscultation bilaterally, no wheezes rales or rhonchi  ABDOMEN:  Soft, non-tender, positive bowel sounds, no rebound or guarding, no organomegaly   EXTREMITIES:  No edema, normal range of motion  VASCULAR:  Normal pedal pulses, good pulse volume   SKIN: No lesions or rashes on exposed skin  NEURO:  CN II-XII intact, no focal deficits

## 2019-04-10 ENCOUNTER — TELEPHONE (OUTPATIENT)
Dept: FAMILY MEDICINE CLINIC | Facility: CLINIC | Age: 44
End: 2019-04-10

## 2019-04-16 ENCOUNTER — OFFICE VISIT (OUTPATIENT)
Dept: FAMILY MEDICINE CLINIC | Facility: CLINIC | Age: 44
End: 2019-04-16
Payer: COMMERCIAL

## 2019-04-16 VITALS
DIASTOLIC BLOOD PRESSURE: 84 MMHG | TEMPERATURE: 98 F | BODY MASS INDEX: 24.66 KG/M2 | SYSTOLIC BLOOD PRESSURE: 118 MMHG | OXYGEN SATURATION: 98 % | HEIGHT: 65 IN | WEIGHT: 148 LBS | RESPIRATION RATE: 16 BRPM | HEART RATE: 92 BPM

## 2019-04-16 DIAGNOSIS — Z01.818 PRE-OPERATIVE EXAMINATION: Primary | ICD-10-CM

## 2019-04-16 DIAGNOSIS — F41.9 ANXIETY: ICD-10-CM

## 2019-04-16 DIAGNOSIS — D89.40 MAST CELL ACTIVATION SYNDROME (HCC): ICD-10-CM

## 2019-04-16 PROCEDURE — 99214 OFFICE O/P EST MOD 30 MIN: CPT | Performed by: FAMILY MEDICINE

## 2019-04-16 RX ORDER — FUROSEMIDE 20 MG/1
20 TABLET ORAL DAILY PRN
COMMUNITY
Start: 2019-03-22 | End: 2020-08-03

## 2019-04-16 RX ORDER — LEVOTHYROXINE SODIUM 0.05 MG/1
TABLET ORAL
COMMUNITY
End: 2020-10-28 | Stop reason: HOSPADM

## 2019-04-16 RX ORDER — LORAZEPAM 1 MG/1
1 TABLET ORAL EVERY 6 HOURS PRN
Qty: 90 TABLET | Refills: 0 | Status: SHIPPED | OUTPATIENT
Start: 2019-04-16 | End: 2019-05-28 | Stop reason: SDUPTHER

## 2019-05-24 DIAGNOSIS — F41.9 ANXIETY: ICD-10-CM

## 2019-05-24 RX ORDER — LORAZEPAM 1 MG/1
1 TABLET ORAL EVERY 6 HOURS PRN
Qty: 90 TABLET | Refills: 0 | OUTPATIENT
Start: 2019-05-24

## 2019-05-28 ENCOUNTER — OFFICE VISIT (OUTPATIENT)
Dept: FAMILY MEDICINE CLINIC | Facility: CLINIC | Age: 44
End: 2019-05-28
Payer: COMMERCIAL

## 2019-05-28 VITALS
HEIGHT: 65 IN | OXYGEN SATURATION: 97 % | HEART RATE: 89 BPM | DIASTOLIC BLOOD PRESSURE: 80 MMHG | SYSTOLIC BLOOD PRESSURE: 106 MMHG | TEMPERATURE: 97.6 F | RESPIRATION RATE: 18 BRPM | WEIGHT: 134 LBS | BODY MASS INDEX: 22.33 KG/M2

## 2019-05-28 DIAGNOSIS — F41.9 ANXIETY: ICD-10-CM

## 2019-05-28 DIAGNOSIS — Z76.89 ENCOUNTER FOR SUPPORT AND COORDINATION OF TRANSITION OF CARE: Primary | ICD-10-CM

## 2019-05-28 DIAGNOSIS — R13.19 OTHER DYSPHAGIA: ICD-10-CM

## 2019-05-28 DIAGNOSIS — K22.4 ESOPHAGEAL DYSMOTILITY: ICD-10-CM

## 2019-05-28 DIAGNOSIS — E86.0 DEHYDRATION: ICD-10-CM

## 2019-05-28 DIAGNOSIS — Z86.19 HISTORY OF THRUSH: ICD-10-CM

## 2019-05-28 DIAGNOSIS — Z90.89 S/P TONSILLECTOMY: ICD-10-CM

## 2019-05-28 DIAGNOSIS — D89.40 MAST CELL ACTIVATION SYNDROME (HCC): ICD-10-CM

## 2019-05-28 DIAGNOSIS — E55.9 VITAMIN D DEFICIENCY: ICD-10-CM

## 2019-05-28 DIAGNOSIS — R11.2 NAUSEA AND VOMITING, INTRACTABILITY OF VOMITING NOT SPECIFIED, UNSPECIFIED VOMITING TYPE: ICD-10-CM

## 2019-05-28 PROCEDURE — 99213 OFFICE O/P EST LOW 20 MIN: CPT | Performed by: FAMILY MEDICINE

## 2019-05-28 RX ORDER — LORAZEPAM 1 MG/1
1 TABLET ORAL EVERY 6 HOURS PRN
Qty: 90 TABLET | Refills: 0 | Status: SHIPPED | OUTPATIENT
Start: 2019-05-28 | End: 2019-05-28 | Stop reason: SDUPTHER

## 2019-05-28 RX ORDER — LORAZEPAM 1 MG/1
1 TABLET ORAL EVERY 8 HOURS PRN
Qty: 90 TABLET | Refills: 0 | Status: ON HOLD | OUTPATIENT
Start: 2019-05-28 | End: 2019-09-17 | Stop reason: CLARIF

## 2019-06-18 ENCOUNTER — OFFICE VISIT (OUTPATIENT)
Dept: FAMILY MEDICINE CLINIC | Facility: CLINIC | Age: 44
End: 2019-06-18
Payer: COMMERCIAL

## 2019-06-18 VITALS
BODY MASS INDEX: 23.32 KG/M2 | SYSTOLIC BLOOD PRESSURE: 122 MMHG | HEIGHT: 65 IN | OXYGEN SATURATION: 98 % | RESPIRATION RATE: 16 BRPM | HEART RATE: 90 BPM | WEIGHT: 140 LBS | TEMPERATURE: 97.7 F | DIASTOLIC BLOOD PRESSURE: 80 MMHG

## 2019-06-18 DIAGNOSIS — B37.0 ORAL THRUSH: ICD-10-CM

## 2019-06-18 DIAGNOSIS — R53.83 OTHER FATIGUE: ICD-10-CM

## 2019-06-18 DIAGNOSIS — R68.2 DRY MOUTH: ICD-10-CM

## 2019-06-18 DIAGNOSIS — D75.839 THROMBOCYTOSIS: ICD-10-CM

## 2019-06-18 DIAGNOSIS — Z12.39 SCREENING FOR BREAST CANCER: Primary | ICD-10-CM

## 2019-06-18 PROCEDURE — 96372 THER/PROPH/DIAG INJ SC/IM: CPT | Performed by: FAMILY MEDICINE

## 2019-06-18 PROCEDURE — 99213 OFFICE O/P EST LOW 20 MIN: CPT | Performed by: FAMILY MEDICINE

## 2019-06-18 RX ORDER — HYDROXYZINE HYDROCHLORIDE 25 MG/1
TABLET, FILM COATED ORAL
COMMUNITY
Start: 2019-06-10 | End: 2020-08-03

## 2019-06-18 RX ORDER — CYANOCOBALAMIN 1000 UG/ML
1000 INJECTION INTRAMUSCULAR; SUBCUTANEOUS
Status: DISCONTINUED | OUTPATIENT
Start: 2019-06-18 | End: 2019-09-17 | Stop reason: CLARIF

## 2019-06-18 RX ADMIN — CYANOCOBALAMIN 1000 MCG: 1000 INJECTION INTRAMUSCULAR; SUBCUTANEOUS at 15:48

## 2019-06-20 ENCOUNTER — TELEPHONE (OUTPATIENT)
Dept: OTHER | Facility: OTHER | Age: 44
End: 2019-06-20

## 2019-06-21 DIAGNOSIS — Z86.19 HISTORY OF THRUSH: ICD-10-CM

## 2019-06-26 ENCOUNTER — HOSPITAL ENCOUNTER (OUTPATIENT)
Dept: MAMMOGRAPHY | Facility: HOSPITAL | Age: 44
Discharge: HOME | End: 2019-06-26
Payer: COMMERCIAL

## 2019-06-26 VITALS — BODY MASS INDEX: 23.32 KG/M2 | HEIGHT: 65 IN | WEIGHT: 140 LBS

## 2019-06-26 DIAGNOSIS — Z12.39 SCREENING FOR BREAST CANCER: ICD-10-CM

## 2019-06-26 PROCEDURE — 77063 BREAST TOMOSYNTHESIS BI: CPT

## 2019-06-26 PROCEDURE — 77067 SCR MAMMO BI INCL CAD: CPT

## 2019-06-28 ENCOUNTER — TELEPHONE (OUTPATIENT)
Dept: FAMILY MEDICINE CLINIC | Facility: CLINIC | Age: 44
End: 2019-06-28

## 2019-07-03 DIAGNOSIS — Z91.09 ENVIRONMENTAL ALLERGIES: Primary | ICD-10-CM

## 2019-07-03 DIAGNOSIS — B37.9 CANDIDIASIS: Primary | ICD-10-CM

## 2019-07-03 DIAGNOSIS — D75.839 THROMBOCYTOSIS: Primary | ICD-10-CM

## 2019-07-03 RX ORDER — MONTELUKAST SODIUM 10 MG/1
10 TABLET ORAL 2 TIMES DAILY
Qty: 60 TABLET | Refills: 3 | Status: SHIPPED | OUTPATIENT
Start: 2019-07-03 | End: 2020-08-03

## 2019-07-03 RX ORDER — FLUCONAZOLE 150 MG/1
150 TABLET ORAL ONCE
Qty: 1 TABLET | Refills: 0 | Status: SHIPPED | OUTPATIENT
Start: 2019-07-03 | End: 2019-07-03

## 2019-07-16 DIAGNOSIS — F51.04 PSYCHOPHYSIOLOGICAL INSOMNIA: Primary | ICD-10-CM

## 2019-07-16 RX ORDER — TEMAZEPAM 30 MG/1
30 CAPSULE ORAL
Qty: 30 CAPSULE | Refills: 2 | Status: ON HOLD | OUTPATIENT
Start: 2019-07-16 | End: 2019-09-15 | Stop reason: SDUPTHER

## 2019-07-17 ENCOUNTER — CLINICAL SUPPORT (OUTPATIENT)
Dept: FAMILY MEDICINE CLINIC | Facility: CLINIC | Age: 44
End: 2019-07-17
Payer: MEDICARE

## 2019-07-17 DIAGNOSIS — D51.0 PERNICIOUS ANEMIA: Primary | ICD-10-CM

## 2019-07-17 PROCEDURE — 96372 THER/PROPH/DIAG INJ SC/IM: CPT | Performed by: FAMILY MEDICINE

## 2019-07-17 RX ORDER — CYANOCOBALAMIN 1000 UG/ML
1000 INJECTION INTRAMUSCULAR; SUBCUTANEOUS
Status: DISCONTINUED | OUTPATIENT
Start: 2019-07-17 | End: 2019-09-17 | Stop reason: CLARIF

## 2019-07-17 RX ADMIN — CYANOCOBALAMIN 1000 MCG: 1000 INJECTION INTRAMUSCULAR; SUBCUTANEOUS at 11:55

## 2019-08-14 ENCOUNTER — CLINICAL SUPPORT (OUTPATIENT)
Dept: FAMILY MEDICINE CLINIC | Facility: CLINIC | Age: 44
End: 2019-08-14
Payer: MEDICARE

## 2019-08-14 DIAGNOSIS — E53.8 B12 DEFICIENCY: Primary | ICD-10-CM

## 2019-08-14 PROCEDURE — 96372 THER/PROPH/DIAG INJ SC/IM: CPT | Performed by: FAMILY MEDICINE

## 2019-08-14 RX ADMIN — CYANOCOBALAMIN 1000 MCG: 1000 INJECTION INTRAMUSCULAR; SUBCUTANEOUS at 11:35

## 2019-08-14 NOTE — PROGRESS NOTES
Chief Complaint   Patient presents with    Injections     Francisco Johns was seen today for injections      Diagnoses and all orders for this visit:    B12 deficiency

## 2019-08-26 ENCOUNTER — CONSULT (OUTPATIENT)
Dept: HEMATOLOGY ONCOLOGY | Facility: HOSPITAL | Age: 44
End: 2019-08-26
Payer: COMMERCIAL

## 2019-08-26 ENCOUNTER — APPOINTMENT (EMERGENCY)
Dept: RADIOLOGY | Facility: HOSPITAL | Age: 44
DRG: 872 | End: 2019-08-26
Payer: COMMERCIAL

## 2019-08-26 ENCOUNTER — APPOINTMENT (EMERGENCY)
Dept: CT IMAGING | Facility: HOSPITAL | Age: 44
DRG: 872 | End: 2019-08-26
Payer: COMMERCIAL

## 2019-08-26 ENCOUNTER — TELEPHONE (OUTPATIENT)
Dept: FAMILY MEDICINE CLINIC | Facility: CLINIC | Age: 44
End: 2019-08-26

## 2019-08-26 ENCOUNTER — HOSPITAL ENCOUNTER (INPATIENT)
Facility: HOSPITAL | Age: 44
LOS: 17 days | DRG: 872 | End: 2019-09-12
Attending: EMERGENCY MEDICINE | Admitting: INTERNAL MEDICINE
Payer: COMMERCIAL

## 2019-08-26 VITALS
HEIGHT: 65 IN | DIASTOLIC BLOOD PRESSURE: 80 MMHG | WEIGHT: 134 LBS | RESPIRATION RATE: 18 BRPM | SYSTOLIC BLOOD PRESSURE: 118 MMHG | HEART RATE: 104 BPM | TEMPERATURE: 99.3 F | OXYGEN SATURATION: 98 % | BODY MASS INDEX: 22.33 KG/M2

## 2019-08-26 DIAGNOSIS — R65.20 SEVERE SEPSIS (HCC): ICD-10-CM

## 2019-08-26 DIAGNOSIS — K92.2 GI BLEED: Primary | ICD-10-CM

## 2019-08-26 DIAGNOSIS — D75.839 THROMBOCYTOSIS: Primary | ICD-10-CM

## 2019-08-26 DIAGNOSIS — K51.918 ULCERATIVE COLITIS WITH OTHER COMPLICATION, UNSPECIFIED LOCATION (HCC): ICD-10-CM

## 2019-08-26 DIAGNOSIS — K62.89 PROCTITIS: ICD-10-CM

## 2019-08-26 DIAGNOSIS — A41.9 SEVERE SEPSIS (HCC): ICD-10-CM

## 2019-08-26 DIAGNOSIS — K51.911 ULCERATIVE COLITIS WITH RECTAL BLEEDING, UNSPECIFIED LOCATION (HCC): ICD-10-CM

## 2019-08-26 DIAGNOSIS — R52 PAIN: ICD-10-CM

## 2019-08-26 DIAGNOSIS — M79.89 SWELLING IN LEFT ARMPIT: ICD-10-CM

## 2019-08-26 LAB
ABO GROUP BLD: NORMAL
ALBUMIN SERPL BCP-MCNC: 3.9 G/DL (ref 3.5–5)
ALP SERPL-CCNC: 56 U/L (ref 46–116)
ALT SERPL W P-5'-P-CCNC: 25 U/L (ref 12–78)
ANION GAP SERPL CALCULATED.3IONS-SCNC: 13 MMOL/L (ref 4–13)
APTT PPP: 24 SECONDS (ref 23–37)
AST SERPL W P-5'-P-CCNC: 33 U/L (ref 5–45)
BACTERIA UR QL AUTO: ABNORMAL /HPF
BASOPHILS # BLD AUTO: 0.1 THOUSANDS/ΜL (ref 0–0.1)
BASOPHILS NFR BLD AUTO: 1 % (ref 0–1)
BILIRUB SERPL-MCNC: 0.4 MG/DL (ref 0.2–1)
BILIRUB UR QL STRIP: NEGATIVE
BLD GP AB SCN SERPL QL: NEGATIVE
BUN SERPL-MCNC: 18 MG/DL (ref 5–25)
CALCIUM SERPL-MCNC: 9.3 MG/DL (ref 8.3–10.1)
CHLORIDE SERPL-SCNC: 102 MMOL/L (ref 100–108)
CLARITY UR: CLEAR
CO2 SERPL-SCNC: 22 MMOL/L (ref 21–32)
COLOR UR: YELLOW
CREAT SERPL-MCNC: 1.06 MG/DL (ref 0.6–1.3)
EOSINOPHIL # BLD AUTO: 0.1 THOUSAND/ΜL (ref 0–0.61)
EOSINOPHIL NFR BLD AUTO: 1 % (ref 0–6)
ERYTHROCYTE [DISTWIDTH] IN BLOOD BY AUTOMATED COUNT: 12.5 % (ref 11.6–15.1)
GFR SERPL CREATININE-BSD FRML MDRD: 63 ML/MIN/1.73SQ M
GLUCOSE SERPL-MCNC: 94 MG/DL (ref 65–140)
GLUCOSE UR STRIP-MCNC: NEGATIVE MG/DL
HCT VFR BLD AUTO: 39.2 % (ref 34.8–46.1)
HCT VFR BLD AUTO: 41.2 % (ref 34.8–46.1)
HGB BLD-MCNC: 13.1 G/DL (ref 11.5–15.4)
HGB BLD-MCNC: 14.3 G/DL (ref 11.5–15.4)
HGB UR QL STRIP.AUTO: ABNORMAL
IMM GRANULOCYTES # BLD AUTO: 0.04 THOUSAND/UL (ref 0–0.2)
IMM GRANULOCYTES NFR BLD AUTO: 0 % (ref 0–2)
INR PPP: 0.91 (ref 0.84–1.19)
KETONES UR STRIP-MCNC: NEGATIVE MG/DL
LACTATE SERPL-SCNC: 1.6 MMOL/L (ref 0.5–2)
LACTATE SERPL-SCNC: 2.3 MMOL/L (ref 0.5–2)
LEUKOCYTE ESTERASE UR QL STRIP: NEGATIVE
LIPASE SERPL-CCNC: 117 U/L (ref 73–393)
LYMPHOCYTES # BLD AUTO: 1.26 THOUSANDS/ΜL (ref 0.6–4.47)
LYMPHOCYTES NFR BLD AUTO: 7 % (ref 14–44)
MCH RBC QN AUTO: 32.4 PG (ref 26.8–34.3)
MCHC RBC AUTO-ENTMCNC: 34.7 G/DL (ref 31.4–37.4)
MCV RBC AUTO: 93 FL (ref 82–98)
MONOCYTES # BLD AUTO: 0.36 THOUSAND/ΜL (ref 0.17–1.22)
MONOCYTES NFR BLD AUTO: 2 % (ref 4–12)
NEUTROPHILS # BLD AUTO: 15.47 THOUSANDS/ΜL (ref 1.85–7.62)
NEUTS SEG NFR BLD AUTO: 89 % (ref 43–75)
NITRITE UR QL STRIP: NEGATIVE
NON-SQ EPI CELLS URNS QL MICRO: ABNORMAL /HPF
NRBC BLD AUTO-RTO: 0 /100 WBCS
PH UR STRIP.AUTO: 6 [PH]
PLATELET # BLD AUTO: 337 THOUSANDS/UL (ref 149–390)
PLATELET # BLD AUTO: 352 THOUSANDS/UL (ref 149–390)
PMV BLD AUTO: 9.2 FL (ref 8.9–12.7)
PMV BLD AUTO: 9.2 FL (ref 8.9–12.7)
POTASSIUM SERPL-SCNC: 3.7 MMOL/L (ref 3.5–5.3)
PROT SERPL-MCNC: 7.6 G/DL (ref 6.4–8.2)
PROT UR STRIP-MCNC: NEGATIVE MG/DL
PROTHROMBIN TIME: 12.2 SECONDS (ref 11.6–14.5)
RBC # BLD AUTO: 4.41 MILLION/UL (ref 3.81–5.12)
RBC #/AREA URNS AUTO: ABNORMAL /HPF
RH BLD: POSITIVE
SODIUM SERPL-SCNC: 137 MMOL/L (ref 136–145)
SP GR UR STRIP.AUTO: 1.02 (ref 1–1.03)
SPECIMEN EXPIRATION DATE: NORMAL
UROBILINOGEN UR QL STRIP.AUTO: 0.2 E.U./DL
WBC # BLD AUTO: 17.33 THOUSAND/UL (ref 4.31–10.16)
WBC #/AREA URNS AUTO: ABNORMAL /HPF

## 2019-08-26 PROCEDURE — 85018 HEMOGLOBIN: CPT | Performed by: NURSE PRACTITIONER

## 2019-08-26 PROCEDURE — 85025 COMPLETE CBC W/AUTO DIFF WBC: CPT | Performed by: EMERGENCY MEDICINE

## 2019-08-26 PROCEDURE — 36415 COLL VENOUS BLD VENIPUNCTURE: CPT | Performed by: EMERGENCY MEDICINE

## 2019-08-26 PROCEDURE — 96365 THER/PROPH/DIAG IV INF INIT: CPT

## 2019-08-26 PROCEDURE — 87186 SC STD MICRODIL/AGAR DIL: CPT | Performed by: EMERGENCY MEDICINE

## 2019-08-26 PROCEDURE — 99223 1ST HOSP IP/OBS HIGH 75: CPT | Performed by: NURSE PRACTITIONER

## 2019-08-26 PROCEDURE — 86900 BLOOD TYPING SEROLOGIC ABO: CPT | Performed by: EMERGENCY MEDICINE

## 2019-08-26 PROCEDURE — 84145 PROCALCITONIN (PCT): CPT | Performed by: EMERGENCY MEDICINE

## 2019-08-26 PROCEDURE — 87077 CULTURE AEROBIC IDENTIFY: CPT | Performed by: EMERGENCY MEDICINE

## 2019-08-26 PROCEDURE — 87040 BLOOD CULTURE FOR BACTERIA: CPT | Performed by: EMERGENCY MEDICINE

## 2019-08-26 PROCEDURE — 71045 X-RAY EXAM CHEST 1 VIEW: CPT

## 2019-08-26 PROCEDURE — 81001 URINALYSIS AUTO W/SCOPE: CPT | Performed by: EMERGENCY MEDICINE

## 2019-08-26 PROCEDURE — 99285 EMERGENCY DEPT VISIT HI MDM: CPT

## 2019-08-26 PROCEDURE — 96361 HYDRATE IV INFUSION ADD-ON: CPT

## 2019-08-26 PROCEDURE — 85049 AUTOMATED PLATELET COUNT: CPT | Performed by: NURSE PRACTITIONER

## 2019-08-26 PROCEDURE — 85014 HEMATOCRIT: CPT | Performed by: NURSE PRACTITIONER

## 2019-08-26 PROCEDURE — 99291 CRITICAL CARE FIRST HOUR: CPT | Performed by: EMERGENCY MEDICINE

## 2019-08-26 PROCEDURE — 80053 COMPREHEN METABOLIC PANEL: CPT | Performed by: EMERGENCY MEDICINE

## 2019-08-26 PROCEDURE — 93005 ELECTROCARDIOGRAM TRACING: CPT

## 2019-08-26 PROCEDURE — 85730 THROMBOPLASTIN TIME PARTIAL: CPT | Performed by: EMERGENCY MEDICINE

## 2019-08-26 PROCEDURE — 82272 OCCULT BLD FECES 1-3 TESTS: CPT | Performed by: NURSE PRACTITIONER

## 2019-08-26 PROCEDURE — 96375 TX/PRO/DX INJ NEW DRUG ADDON: CPT

## 2019-08-26 PROCEDURE — 83605 ASSAY OF LACTIC ACID: CPT | Performed by: EMERGENCY MEDICINE

## 2019-08-26 PROCEDURE — 86901 BLOOD TYPING SEROLOGIC RH(D): CPT | Performed by: EMERGENCY MEDICINE

## 2019-08-26 PROCEDURE — 85610 PROTHROMBIN TIME: CPT | Performed by: EMERGENCY MEDICINE

## 2019-08-26 PROCEDURE — 86850 RBC ANTIBODY SCREEN: CPT | Performed by: EMERGENCY MEDICINE

## 2019-08-26 PROCEDURE — 99204 OFFICE O/P NEW MOD 45 MIN: CPT | Performed by: INTERNAL MEDICINE

## 2019-08-26 PROCEDURE — 83690 ASSAY OF LIPASE: CPT | Performed by: EMERGENCY MEDICINE

## 2019-08-26 PROCEDURE — 74176 CT ABD & PELVIS W/O CONTRAST: CPT

## 2019-08-26 RX ORDER — ACETAMINOPHEN 325 MG/1
650 TABLET ORAL ONCE
Status: COMPLETED | OUTPATIENT
Start: 2019-08-26 | End: 2019-08-26

## 2019-08-26 RX ORDER — HYDROMORPHONE HCL/PF 1 MG/ML
0.5 SYRINGE (ML) INJECTION ONCE
Status: COMPLETED | OUTPATIENT
Start: 2019-08-26 | End: 2019-08-26

## 2019-08-26 RX ORDER — SODIUM CHLORIDE 9 MG/ML
125 INJECTION, SOLUTION INTRAVENOUS CONTINUOUS
Status: DISCONTINUED | OUTPATIENT
Start: 2019-08-26 | End: 2019-08-27

## 2019-08-26 RX ORDER — LEVOFLOXACIN 5 MG/ML
750 INJECTION, SOLUTION INTRAVENOUS ONCE
Status: COMPLETED | OUTPATIENT
Start: 2019-08-26 | End: 2019-08-26

## 2019-08-26 RX ORDER — FAMOTIDINE 20 MG/1
20 TABLET, FILM COATED ORAL 2 TIMES DAILY
Status: DISCONTINUED | OUTPATIENT
Start: 2019-08-26 | End: 2019-08-27

## 2019-08-26 RX ORDER — LORATADINE 10 MG/1
10 TABLET ORAL DAILY
Status: DISCONTINUED | OUTPATIENT
Start: 2019-08-27 | End: 2019-08-27

## 2019-08-26 RX ORDER — ONDANSETRON 2 MG/ML
4 INJECTION INTRAMUSCULAR; INTRAVENOUS ONCE
Status: COMPLETED | OUTPATIENT
Start: 2019-08-26 | End: 2019-08-26

## 2019-08-26 RX ORDER — FENTANYL CITRATE 50 UG/ML
25 INJECTION, SOLUTION INTRAMUSCULAR; INTRAVENOUS ONCE
Status: COMPLETED | OUTPATIENT
Start: 2019-08-26 | End: 2019-08-26

## 2019-08-26 RX ORDER — DIPHENHYDRAMINE HYDROCHLORIDE 50 MG/ML
25 INJECTION INTRAMUSCULAR; INTRAVENOUS ONCE
Status: COMPLETED | OUTPATIENT
Start: 2019-08-26 | End: 2019-08-26

## 2019-08-26 RX ORDER — MONTELUKAST SODIUM 10 MG/1
10 TABLET ORAL 2 TIMES DAILY
Status: DISCONTINUED | OUTPATIENT
Start: 2019-08-26 | End: 2019-08-27

## 2019-08-26 RX ORDER — LORAZEPAM 1 MG/1
1 TABLET ORAL EVERY 8 HOURS PRN
Status: DISCONTINUED | OUTPATIENT
Start: 2019-08-26 | End: 2019-09-12 | Stop reason: HOSPADM

## 2019-08-26 RX ORDER — BUTALBITAL, ACETAMINOPHEN AND CAFFEINE 50; 325; 40 MG/1; MG/1; MG/1
1 TABLET ORAL EVERY 8 HOURS PRN
Status: DISCONTINUED | OUTPATIENT
Start: 2019-08-26 | End: 2019-09-12 | Stop reason: HOSPADM

## 2019-08-26 RX ORDER — LEVOFLOXACIN 5 MG/ML
750 INJECTION, SOLUTION INTRAVENOUS EVERY 24 HOURS
Status: DISCONTINUED | OUTPATIENT
Start: 2019-08-27 | End: 2019-08-27

## 2019-08-26 RX ORDER — FENTANYL CITRATE 50 UG/ML
50 INJECTION, SOLUTION INTRAMUSCULAR; INTRAVENOUS ONCE
Status: COMPLETED | OUTPATIENT
Start: 2019-08-26 | End: 2019-08-26

## 2019-08-26 RX ADMIN — HYDROMORPHONE HYDROCHLORIDE 0.5 MG: 1 INJECTION, SOLUTION INTRAMUSCULAR; INTRAVENOUS; SUBCUTANEOUS at 20:42

## 2019-08-26 RX ADMIN — BUTALBITAL, ACETAMINOPHEN AND CAFFEINE 1 TABLET: 50; 325; 40 TABLET ORAL at 23:27

## 2019-08-26 RX ADMIN — SODIUM CHLORIDE 1000 ML: 0.9 INJECTION, SOLUTION INTRAVENOUS at 16:01

## 2019-08-26 RX ADMIN — ONDANSETRON 4 MG: 2 INJECTION INTRAMUSCULAR; INTRAVENOUS at 15:53

## 2019-08-26 RX ADMIN — SODIUM CHLORIDE 125 ML/HR: 0.9 INJECTION, SOLUTION INTRAVENOUS at 21:16

## 2019-08-26 RX ADMIN — FENTANYL CITRATE 50 MCG: 50 INJECTION, SOLUTION INTRAMUSCULAR; INTRAVENOUS at 15:53

## 2019-08-26 RX ADMIN — FENTANYL CITRATE 25 MCG: 50 INJECTION, SOLUTION INTRAMUSCULAR; INTRAVENOUS at 19:55

## 2019-08-26 RX ADMIN — LEVOFLOXACIN 750 MG: 5 INJECTION, SOLUTION INTRAVENOUS at 16:54

## 2019-08-26 RX ADMIN — SODIUM CHLORIDE 125 ML/HR: 0.9 INJECTION, SOLUTION INTRAVENOUS at 18:53

## 2019-08-26 RX ADMIN — LORAZEPAM 1 MG: 1 TABLET ORAL at 23:39

## 2019-08-26 RX ADMIN — DIPHENHYDRAMINE HYDROCHLORIDE 25 MG: 50 INJECTION, SOLUTION INTRAMUSCULAR; INTRAVENOUS at 16:21

## 2019-08-26 RX ADMIN — METRONIDAZOLE 500 MG: 500 INJECTION, SOLUTION INTRAVENOUS at 18:33

## 2019-08-26 RX ADMIN — ACETAMINOPHEN 650 MG: 325 TABLET, FILM COATED ORAL at 16:18

## 2019-08-26 RX ADMIN — FAMOTIDINE 20 MG: 10 INJECTION, SOLUTION INTRAVENOUS at 16:21

## 2019-08-26 RX ADMIN — FAMOTIDINE 20 MG: 20 TABLET ORAL at 21:43

## 2019-08-26 RX ADMIN — SODIUM CHLORIDE 1000 ML: 0.9 INJECTION, SOLUTION INTRAVENOUS at 17:08

## 2019-08-26 RX ADMIN — MONTELUKAST 10 MG: 10 TABLET, FILM COATED ORAL at 21:43

## 2019-08-26 NOTE — ED NOTES
Verbal orders from Dr Danii Mckeon to run levaquin as initial antibiotic treatment     Laury Hartmann RN  08/26/19 6231

## 2019-08-26 NOTE — PATIENT INSTRUCTIONS
The patient is aware seek medical attention for pain or swelling the legs, chest pain, shortness of breast, nose bleeds, easy bruising, persistent or progressive pruritus, excessive fatigue, or if other new problems arise

## 2019-08-26 NOTE — ED NOTES
Spoke with Dr Bashir Macias about calling a sepsis alert   Denies at present     Hilda Parkinson, ROLLY  08/26/19 2451

## 2019-08-26 NOTE — PROGRESS NOTES
8/26/2019    Xu Shaw was seen in consultation today in regards to thrombocytosis    She is 53years old and there is history of pancolitis for which she has received infliximab (Remicade) every 6 weeks since January 2018  She was started on IVIG infusions on July 3, 2019 for management of mast cell activation syndrome diagnosed by Dr Benito Zaman of the [facility] and [address] [place] [address] and treatments have been administered at the [facility] at the direction of allergist and immunologist Dr Karl Smith  She has received 2 infusions thus far, the 3rd infusion is scheduled for August 29, 2019  Hematology/oncology history:    Superficial phlebitis of the distal left lower extremity in 2014    Review of Systems:     General:  She notes easy fatigue  She denies chills or sweats  Head and Neck: No nosebleeds, no oral cavity or throat soreness  Cardiovascular: No chest pain, no lower extremity edema  Respriatory: No cough or dyspnea  GI:  Her appetite has been fair  Her weight has decreased from 148 lb 234 lb in the past half year  Bowel habits have been irregular on a long-term basis  : No urinary frequency  Musculoskeletal:  She has chronic joint pain including hands, wrists, elbows, posterior neck, lower back, hips, knees and ankles  Skin: No skin rash  There has been progressive pruritus especially in the past 2 months  Neurological:  She has had infrequent migraine headache    No numbness, no weakness  Hematologic: No easy bruising  Psychiatric: No emotional problems    Medications:    Current Outpatient Medications   Medication Sig Dispense Refill    butalbital-acetaminophen-caffeine (FIORICET,ESGIC) -40 mg per tablet Take 1 tablet by mouth every 8 (eight) hours as needed for headaches 20 tablet 0    cetirizine (ZyrTEC) 10 mg tablet Take 20 mg by mouth 2 (two) times a day as needed ("relief")       cholecalciferol (VITAMIN D) 400 units/mL Take 2 5 mL (1,000 Units total) by mouth daily 1 Bottle 5    diphenhydrAMINE (BENADRYL) 25 mg tablet Take 50 mg by mouth as needed for itching       EPINEPHrine (EPIPEN 2-MARYSOL IJ) EpiPen   prn      Fexofenadine HCl (MUCINEX ALLERGY PO) Take by mouth      furosemide (LASIX) 20 mg tablet Take 20 mg by mouth daily as needed       hydrOXYzine HCL (ATARAX) 25 mg tablet       inFLIXimab (REMICADE IV) 700 mg Every 6 weeks      levothyroxine (LEVOXYL) 50 mcg tablet Levoxyl 50 mcg tablet      LORazepam (ATIVAN) 1 mg tablet Take 1 tablet (1 mg total) by mouth every 8 (eight) hours as needed for anxiety 90 tablet 0    montelukast (SINGULAIR) 10 mg tablet Take 1 tablet (10 mg total) by mouth 2 (two) times a day 60 tablet 3    nystatin (MYCOSTATIN) 500,000 units/5 mL suspension Apply 5 mL (500,000 Units total) to the mouth or throat 4 (four) times a day 280 mL 0    omalizumab (XOLAIR) 150 mg Inject 300 mg under the skin every 28 days       pantoprazole (PROTONIX) 40 mg tablet Take 40 mg by mouth 2 (two) times a day       Probiotic Product (PROBIOTIC DAILY PO) Probiotic   floistar      ranitidine (ZANTAC) 150 MG capsule Take 300 mg by mouth 2 (two) times a day       temazepam (RESTORIL) 30 mg capsule Take 1 capsule (30 mg total) by mouth daily at bedtime 30 capsule 2    traMADol (ULTRAM) 50 mg tablet Take 1 tablet (50 mg total) by mouth every 6 (six) hours as needed for moderate pain or severe pain (Patient taking differently: Take 50 mg by mouth 2 (two) times a day ) 10 tablet 0     Current Facility-Administered Medications   Medication Dose Route Frequency Provider Last Rate Last Dose    cyanocobalamin injection 1,000 mcg  1,000 mcg Intramuscular Q30 Days Asya Castillo PA-C   1,000 mcg at 08/14/19 1135    cyanocobalamin injection 1,000 mcg  1,000 mcg Intramuscular Q30 Days Franchesca Baer PA-C   1,000 mcg at 06/18/19 1548    cyanocobalamin injection 1,000 mcg  1,000 mcg Intramuscular Q30 Days Franchesca Baer PA-C   1,000 [facility] [place] 1155     Past Medical History:    Pan colitis, see HPI  Mast cell activation syndrome, see HPI  Diastolic congestive heart failure, recurrent  Degenerative disease of the lower spine    Past Surgical History:    Hysterectomy for endometriosis   Cholecystectomy 4374 complicated with pancreatitis  Left ankle reconstruction  and then removal of hardware later that year  Appendectomy in removal of ovarian cysts   Exploratory laparotomy   Tonsil and adenoidectomy 2019    Family History:    Her father  of osteomyelitis at age 79, he had history of heart failure and diabetes mellitus  Her mother age 79 is in good health  Her brother is in good health  She has 3 children a son age 25 in good health, a daughter age 25 who has asthma and is son 12 who is autistic    Social History:    She is  and lives with her   She has never been a cigarette smoker  She is not an alcohol drinker  She has worked for 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 [place] and 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    Physical Examination:    Blood pressure 118/80, pulse 104, respiratory rate 18, temperature is 99 3°, oxygen saturation on room air at rest is 98%, 65 in, 134 lb    General appearance: Appears well  Head: Normocephalic  Eyes: Extraocular movements intact  Ears: No gross hearing deficit  Oropharynx: Clear  Neck: Supple, No lymphadenopathy  Chest: No axillary adenopathy (breast examination was not done today )  Lungs: Clear to auscultation bilaterally  Heart: Regular rate and rhythm  Abdomen: No tenderness, no hepatic or splenic enlargement; No inguinal  lymphadenopathy  Extremities: No lower extremity edema bilaterally  Skin: No rashes or excoriations    No ecchymoses  Neurologic: Grossly intact, no focal neurological deficit  Psychiatric: Oriented to person, place and time, normal mood and affect    ECOG 1    Laboratory:      From  19, 2015:  KIT [id number] mutation analysis is negative  From May 23, 2019:  Vitamin B12 is 817    From June 6, 2019:  Creatinine 0 6, calcium 9 4, AST 22, ALT 19, bili 0 3, alk-phos 52, IgG 1172, tryptase 11 1 (< 10 9)    From June 30, 2019:  CRP is 0 12 (< 0 5), WBC 6 52, hemoglobin 12 7, hematocrit 37 4, platelets 029, on WBC differential neutrophils 64%, lymphs 27%, monos 6%, basos 2%, eos 1%, serum iron is 97 with saturation 37%    From August 13, 2019:  WBC is 5 72, hemoglobin 15 6, hematocrit 47 0% with MCV 95, platelets 529 (233-248), TSH 1 48, free T4 1 17    MRI of the lumbar spine from February 8, 2019:  Overall marrow signal intensity pattern is within normal limits, there is mild multilevel lumbar spondylitic changes  CT angiogram of the chest and contrast enhanced CT of the abdomen pelvis from February 12, 2019:  No acute or chronic pulmonary thromboembolism, minimal scattered areas of atelectasis, mediastinum and hilar are unremarkable, there is moderate intra and extrahepatic biliary ductal dilatation unchanged post cholecystectomy, no focal liver lesions, spleen, adrenals, kidneys are unremarkable other than suspected subcentimeter benign cortical renal cysts, mild prominence of pancreatic duct measuring 4 mm unchanged compared to August 2017, no abdominal pelvic lymphadenopathy, no destructive osseous lesions  3D screening bilateral mammogram from June 26, 2019:  Breasts are extremely dense, no mammographic evidence of malignancy    Assessment: Thrombocytosis, mild  Most likely thrombocytosis is related to pancolitis  A primary bone marrow disorder such as a myeloproliferative disorder needs to be considered, although, there is lack of splenomegaly on examination and as demonstrated on CT imaging  Recommendations:    Further evaluation with JAK2 mutation testing and LD is recommended to assess for an underlying myeloproliferative disorder    If there is lack of JAK2 mutation further evaluation is not recommended unless there is progressive thrombocytosis or other blood count abnormalities raising suspicion for an underlying primary bone marrow disorder  Management of the mast cell activation syndrome will be left up to the expertise of  Dr Christina Jaimes of the allergy and immune allergy service at the [facility]  The patient is aware seek medical attention for pain or swelling the legs, chest pain, shortness of breast, nose bleeds, easy bruising, persistent or progressive pruritus, excessive fatigue, or if other new problems arise  Otherwise, plan to see her again in 6 months  Today's office visit required 45 minutes, over 50% of the time was utilized to review diagnostic tests, impressions and recommendations

## 2019-08-26 NOTE — TELEPHONE ENCOUNTER
Pt called stating that she is having severe abdominal pain and is bleeding rectally  Pt is on her way to the ED

## 2019-08-26 NOTE — SEPSIS NOTE
Sepsis Note   Lulu Snellen 53 y o  female MRN: [id number]  Unit/Bed#: RM14 Encounter: [id number]      qSOFA     [address] Name 08/26/19 1730 08/26/19 1715 08/26/19 1711 08/26/19 1654 08/26/19 1630    Altered mental status GCS < 15  --  --  --  --  --    Respiratory Rate > / =22  0  0  1  0  0    Systolic BP < / =097  0  1  1  0  0    Q Sofa Score  0  1  2  0  0    Row Name 08/26/19 1615 08/26/19 1520             Altered mental status GCS < 15  --  --       Respiratory Rate > / =22  1  1       Systolic BP < / =992  0  0       Q Sofa Score  1  1           Initial Sepsis Screening     Row Name 08/26/19 1642                Is the patient's history suggestive of a new or worsening infection? (!) Yes (Proceed)  -RP        Suspected source of infection  acute abdominal infection  -RP        Are two or more of the following signs & symptoms of infection both present and new to the patient? (!) Yes (Proceed)  -RP        Indicate SIRS criteria  Leukocytosis (WBC > 15224 IJL); Tachycardia > 90 bpm  -RP        If the answer is yes to both questions, suspicion of sepsis is present  --        If severe sepsis is present AND tissue hypoperfusion perists in the hour after fluid resuscitation or lactate > 4, the patient meets criteria for SEPTIC SHOCK  --        Are any of the following organ dysfunction criteria present within 6 hours of suspected infection and SIRS criteria that are NOT considered to be chronic conditions? (!) Yes  -RP        Organ dysfunction  Lactate > 2 0 mmol/L  -RP        Date of presentation of severe sepsis  --        Time of presentation of severe sepsis  --        Tissue hypoperfusion persists in the hour after crystalloid fluid administration, evidenced, by either:  --        Was hypotension present within one hour of the conclusion of crystalloid fluid administration?   No  -RP        Date of presentation of septic shock  --        Time of presentation of septic shock  --          User Key  (r) = Recorded By, (t) = Taken By, (c) = Cosigned By    [address] Name Provider Type    RP To Urias MD Physician               Default Flowsheet Data (last 720 hours)      Sepsis Reassess     Row Name 08/26/19 1750                   Repeat Volume Status and Tissue Perfusion Assessment Performed    Repeat Volume Status and Tissue Perfusion Assessment Performed  Yes  -RP           Volume Status and Tissue Perfusion Post Fluid Resuscitation * Must Document All *    Vital Signs Reviewed (HR, RR, BP, T)  Yes  -RP        Shock Index Reviewed  Yes  -RP        Arterial Oxygen Saturation Reviewed (POx, SaO2 or SpO2)  Yes (comment %)  -RP        Cardio  (!) Normal S1/S2; Regular rate and rhythm; Tachycardia  -RP        Pulmonary  Normal effort  -RP        Capillary Refill  Brisk  -RP        Peripheral Pulses  Radial  -RP        Peripheral Pulse  +2  -RP        Skin  Warm  -RP        Urine output assessed  Adequate  -RP           *OR*   Intensive Monitoring- Must Document One of the Following Four *:    Vital Signs Reviewed  --        * Central Venous Pressure (CVP or RAP)  --        * Central Venous Oxygen (SVO2, ScvO2 or Oxygen saturation via central catheter)  --        * Bedside Cardiovascular US in IVC diameter and % collapse  --        * Passive Leg Raise OR Crystalloid Challenge  --          User Key  (r) = Recorded By, (t) = Taken By, (c) = Cosigned By    Initials Name Provider Type    RP To Urias MD Physician

## 2019-08-26 NOTE — SEPSIS NOTE
Sepsis Note   Ida Odonnell 53 y o  female MRN: [id number]  Unit/Bed#: RM14 Encounter: [id number]      qSOFA     [address] Name 08/26/19 1615 08/26/19 1520             Altered mental status GCS < 15  --  --       Respiratory Rate > / =22  1  1       Systolic BP < / =476  0  0       Q Sofa Score  1  1           Initial Sepsis Screening     Row Name 08/26/19 1642                Is the patient's history suggestive of a new or worsening infection? (!) Yes (Proceed)  -RP        Suspected source of infection  acute abdominal infection  -RP        Are two or more of the following signs & symptoms of infection both present and new to the patient? (!) Yes (Proceed)  -RP        Indicate SIRS criteria  Leukocytosis (WBC > 39056 IJL); Tachycardia > 90 bpm  -RP        If the answer is yes to both questions, suspicion of sepsis is present  --        If severe sepsis is present AND tissue hypoperfusion perists in the hour after fluid resuscitation or lactate > 4, the patient meets criteria for SEPTIC SHOCK  --        Are any of the following organ dysfunction criteria present within 6 hours of suspected infection and SIRS criteria that are NOT considered to be chronic conditions? (!) Yes  -RP        Organ dysfunction  Lactate > 2 0 mmol/L  -RP        Date of presentation of severe sepsis  --        Time of presentation of severe sepsis  --        Tissue hypoperfusion persists in the hour after crystalloid fluid administration, evidenced, by either:  --        Was hypotension present within one hour of the conclusion of crystalloid fluid administration?   No  -RP        Date of presentation of septic shock  --        Time of presentation of septic shock  --          User Key  (r) = Recorded By, (t) = Taken By, (c) = Cosigned By    [address] Name Provider Type    RP Lauri Edmonds MD Physician

## 2019-08-26 NOTE — ED PROVIDER NOTES
History  Chief Complaint   Patient presents with    Rectal Bleeding     Pt states she hasn't been feeling good x3 weeks with blood in stool  States "everything hurts," states hx of diverticulitis  Started this am with fever  This is a 17-year-old female presents to the emergency department with rectal bleeding today  one episode  Moderate amount  Patient states for the last 2-3 weeks she has had decreasing stool size hand has noted some blood when wiping  No significant abdominal pain  Today patient with lower abdominal pain with radiation to the back  Developed a fever this afternoon  Had 1 episode of bright red blood per rectum  No dysuria frequency or urgency  No cough  The patient is immunocompromised  Has not taken steroids since February of this year  Prior history of septic shock  Multiple drug allergies and patient with history of mast cell disorder  She sees a specialist in [place]  Pain is currently severe in nature  Rates pain 12/10  Differential diagnosis includes GI bleed, colitis, perforated viscus, ischemic bowel, sepsis, UTI, pneumonia  Prior to Admission Medications   Prescriptions Last Dose Informant Patient Reported? Taking?    EPINEPHrine (EPIPEN 2-MARYSOL IJ) Unknown at Unknown time  Yes No   Sig: EpiPen   prn   Fexofenadine HCl (MUCINEX ALLERGY PO) Not Taking at Unknown time  Yes No   Sig: Take by mouth   LORazepam (ATIVAN) 1 mg tablet 8/24/2019  No No   Sig: Take 1 tablet (1 mg total) by mouth every 8 (eight) hours as needed for anxiety   Probiotic Product (PROBIOTIC DAILY PO) Past Week at Unknown time  Yes Yes   Sig: Probiotic   floistar   butalbital-acetaminophen-caffeine (FIORICET,ESGIC) -40 mg per tablet More than a month at Unknown time  No No   Sig: Take 1 tablet by mouth every 8 (eight) hours as needed for headaches   cetirizine (ZyrTEC) 10 mg tablet 8/25/2019 at Unknown time  Yes Yes   Sig: Take 20 mg by mouth 2 (two) times a day as needed ("relief") cholecalciferol (VITAMIN D) 400 units/mL More than a month at Unknown time  No No   Sig: Take 2 5 mL (1,000 Units total) by mouth daily   diphenhydrAMINE (BENADRYL) 25 mg tablet 2019 at Unknown time  Yes Yes   Sig: Take 50 mg by mouth as needed for itching    furosemide (LASIX) 20 mg tablet Not Taking at Unknown time  Yes No   Sig: Take 20 mg by mouth daily as needed    hydrOXYzine HCL (ATARAX) 25 mg tablet 2019 at Unknown time  Yes Yes   inFLIXimab (REMICADE IV)   Yes No   Si mg Every 6 weeks   levothyroxine (LEVOXYL) 50 mcg tablet Not Taking at Unknown time  Yes No   Sig: Levoxyl 50 mcg tablet   montelukast (SINGULAIR) 10 mg tablet 2019 at Unknown time  No Yes   Sig: Take 1 tablet (10 mg total) by mouth 2 (two) times a day   nystatin (MYCOSTATIN) 500,000 units/5 mL suspension More than a month at Unknown time  No No   Sig: Apply 5 mL (500,000 Units total) to the mouth or throat 4 (four) times a day   omalizumab Juanita Yogesh) 150 mg 2019  Yes No   Sig: Inject 300 mg under the skin every 28 days    pantoprazole (PROTONIX) 40 mg tablet 2019 at Unknown time  Yes Yes   Sig: Take 40 mg by mouth 2 (two) times a day    ranitidine (ZANTAC) 150 MG capsule Past Week at Unknown time  Yes Yes   Sig: Take 300 mg by mouth 2 (two) times a day    temazepam (RESTORIL) 30 mg capsule 2019 at Unknown time  No Yes   Sig: Take 1 capsule (30 mg total) by mouth daily at bedtime   traMADol (ULTRAM) 50 mg tablet Not Taking at Unknown time  No No   Sig: Take 1 tablet (50 mg total) by mouth every 6 (six) hours as needed for moderate pain or severe pain   Patient not taking: Reported on 2019      Facility-Administered Medications Last Administration Doses Remaining   cyanocobalamin injection 1,000 mcg 2019 11:35 AM    cyanocobalamin injection 1,000 mcg 2019  3:48 PM    cyanocobalamin injection 1,000 mcg 2019 11:55 AM           Past Medical History:   Diagnosis Date    Anxiety     Asthma  Chronic kidney disease     Deep vein thrombosis (HCC)     Disease of thyroid gland     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    Generalized anxiety disorder 8/26/2017    Heart murmur     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HCC)     Migraine     Myocardial infarction [facility])     NSTEMI  pt denies, documented in cardio note    Pancreatitis     sphinger of     Psychiatric disorder     RA [facility]oid [facility]) [facility] [facility]     Ulcerative colitis ([facility] [address] )        Past Surgical History:   Procedure Laterality Date    APPENDECTOMY      CHOLECYSTECTOMY      EGD AND COLONOSCOPY N/A 9/12/2017    Procedure: EGD AND COLONOSCOPY;  Surgeon: Ivory Benson MD;  [facility]; Service: Gastroenterology    ERCP N/A 9/15/2017    Procedure: ENDOSCOPIC RETROGRADE CHOLANGIOPANCREATOGRAPHY (ERCP); Surgeon: nAa María Vidal MD;  [facility];   Service: Gastroenterology    HYSTERECTOMY      KNEE SURGERY Right     LAPAROSCOPIC ENDOMETRIOSIS FULGURATION      ORIF ANKLE FRACTURE Left     MA KNEE SCOPE,MED/LAT MENISECTOMY Left 5/12/2017    Procedure: POSSIBLE MEDIAL MENISECTOMY;  Surgeon: Abbey Orozco MD;  [facility];  Service: Orthopedics    MA KNEE [address] CART Left 5/12/2017    Procedure: KNEE ARTHROSCOPY EVALUATION, CHONDROPLASTY;  Surgeon: Abbey Orozco MD;  [facility];  Service: Orthopedics    MA LAP,DIAGNOSTIC ABDOMEN N/A 12/12/2017    Procedure: LAPAROSCOPY DIAGNOSTIC  WITH LYSIS OF ADHESIONS;  Surgeon: Kerry John DO;  [facility];  Service: General       Family History   Problem Relation Age of Onset    Ulcerative colitis Mother     Heart failure Father     Neuropathy Father     Macular degeneration Father     Diabetes Father     Asthma Daughter     Hypothyroidism Daughter     Heart disease Maternal Grandmother     Arthritis Maternal Grandmother     Arthritis Paternal Grandmother     Macular degeneration Paternal Grandmother     Lymphoma Paternal Grandfather     Heart failure Paternal Aunt     Heart failure Paternal Aunt     Breast cancer Paternal Aunt 47    Breast cancer additional onset Paternal Aunt 62    Hypothyroidism Paternal Aunt     Breast cancer Maternal Aunt     No Known Problems Maternal Grandfather      I have reviewed and agree with the history as documented  Social History     Tobacco Use    Smoking status: Never Smoker    Smokeless tobacco: Never Used   Substance Use Topics    Alcohol use: Never     Frequency: Never     Binge frequency: Never    Drug use: Not Currently        Review of Systems   Constitutional: Positive for fever  Negative for activity change and appetite change  HENT: Negative for congestion, ear pain, rhinorrhea and sore throat  Eyes: Negative for pain and redness  Respiratory: Negative for cough, shortness of breath and wheezing  Cardiovascular: Negative for chest pain and palpitations  Gastrointestinal: Positive for abdominal pain, anal bleeding, blood in stool, constipation and nausea  Negative for diarrhea and vomiting  Endocrine: Negative for polyuria  Genitourinary: Negative for difficulty urinating, dysuria, frequency and urgency  Musculoskeletal: Negative for arthralgias and myalgias  Skin: Negative for color change and rash  Allergic/Immunologic: Negative for immunocompromised state  Neurological: Negative for dizziness, syncope and light-headedness  Hematological: Does not bruise/bleed easily  Psychiatric/Behavioral: Negative for confusion  All other systems reviewed and are negative  Physical Exam  Physical Exam   Constitutional: She is oriented to person, place, and time  She appears well-developed  She appears distressed  HENT:   Head: Normocephalic and atraumatic  Nose: Nose normal    Eyes: Pupils are equal, round, and reactive to light  Conjunctivae are normal  No scleral icterus  Neck: Normal range of motion  Neck supple     Cardiovascular: Normal rate, regular rhythm, normal heart sounds and intact distal pulses  Pulmonary/Chest: Effort normal and breath sounds normal  No stridor  No respiratory distress  She has no wheezes  Abdominal: Soft  She exhibits no distension  There is tenderness (Diffuse with guarding  )  There is no rebound and no guarding  Genitourinary:   Genitourinary Comments: Rectal exam deferred as there was dried blood around the anal opening  Musculoskeletal: She exhibits no edema, tenderness or deformity  Neurological: She is alert and oriented to person, place, and time  Skin: Skin is warm and dry  No rash noted  Psychiatric: Thought content normal    Patient crying, appears very uncomfortable  Nursing note and vitals reviewed        Vital Signs  ED Triage Vitals [08/26/19 1520]   Temperature Pulse Respirations Blood Pressure SpO2   (!) 102 6 °F (39 2 °C) (!) 137 (!) 24 141/73 98 %      Temp Source Heart Rate Source Patient Position - Orthostatic VS BP Location FiO2 (%)   Temporal Monitor Lying Right arm --      Pain Score       Worst Possible Pain           Vitals:    08/26/19 1900 08/26/19 2000 08/26/19 2027 08/26/19 2030   BP: 103/60 101/59 102/57 102/57   Pulse: 90 98 93 94   Patient Position - Orthostatic VS: Sitting Lying Sitting Sitting         Visual Acuity      ED Medications  Medications   sodium chloride 0 9 % infusion (125 mL/hr Intravenous New Bag 8/26/19 2116)   montelukast (SINGULAIR) tablet 10 mg (10 mg Oral Given 8/26/19 2143)   famotidine (PEPCID) tablet 20 mg (20 mg Oral Given 8/26/19 2143)   levofloxacin (LEVAQUIN) IVPB (premix) 750 mg (has no administration in time range)   metroNIDAZOLE (FLAGYL) IVPB (premix) 500 mg (has no administration in time range)   enoxaparin (LOVENOX) subcutaneous injection 40 mg (has no administration in time range)   butalbital-acetaminophen-caffeine (FIORICET,ESGIC) -40 mg per tablet 1 tablet (has no administration in time range)   loratadine (CLARITIN) tablet 10 mg (has no administration in time range)   LORazepam (ATIVAN) tablet 1 mg (has no administration in time range)   fentanyl citrate (PF) 100 MCG/2ML 50 mcg (50 mcg Intravenous Given 8/26/19 1553)   ondansetron (ZOFRAN) injection 4 mg (4 mg Intravenous Given 8/26/19 1553)   sodium chloride 0 9 % bolus 1,000 mL (0 mL Intravenous Stopped 8/26/19 1657)   diphenhydrAMINE (BENADRYL) injection 25 mg (25 mg Intravenous Given 8/26/19 1621)   famotidine (PEPCID) injection 20 mg (20 mg Intravenous Given 8/26/19 1621)   acetaminophen (TYLENOL) tablet 650 mg (650 mg Oral Given 8/26/19 1618)   metroNIDAZOLE (FLAGYL) IVPB (premix) 500 mg (0 mg Intravenous Stopped 8/26/19 1903)   levofloxacin (LEVAQUIN) IVPB (premix) 750 mg (0 mg Intravenous Stopped 8/26/19 1824)   fentanyl citrate (PF) 100 MCG/2ML 25 mcg (25 mcg Intravenous Given 8/26/19 1955)   sodium chloride 0 9 % bolus 1,000 mL (0 mL Intravenous Stopped 8/26/19 1839)   HYDROmorphone (DILAUDID) injection 0 5 mg (0 5 mg Intravenous Given 8/26/19 2042)       Diagnostic Studies  Results Reviewed     Procedure Component Value Units Date/Time    Lactic acid x2 [[id number]]  (Normal) Collected:  08/26/19 1750    Lab Status:  Final result Specimen:  Blood from Arm, Right Updated:  08/26/19 1811     LACTIC ACID 1 6 mmol/L     Narrative:       Result may be elevated if tourniquet was used during collection      Urine Microscopic [[id number]]  (Abnormal) Collected:  08/26/19 1650    Lab Status:  Final result Specimen:  Urine, Clean Catch Updated:  08/26/19 1714     RBC, UA 1-2 /hpf      WBC, UA 0-1 /hpf      Epithelial Cells Occasional /hpf      Bacteria, UA None Seen /hpf     UA w Reflex to Microscopic w Reflex to Culture [[id number]]  (Abnormal) Collected:  08/26/19 1650    Lab Status:  Final result Specimen:  Urine, Clean Catch Updated:  08/26/19 1707     Color, UA Yellow     Clarity, UA Clear     Specific [place], UA 1 020     pH, UA 6 0     Leukocytes, UA Negative     Nitrite, UA Negative Protein, UA Negative mg/dl      Glucose, UA Negative mg/dl      Ketones, UA Negative mg/dl      Urobilinogen, UA 0 2 E U /dl      Bilirubin, UA Negative     Blood, UA Trace-Intact    Lactic acid x2 [[id number]]  (Abnormal) Collected:  08/26/19 1544    Lab Status:  Final result Specimen:  Blood from Arm, Left Updated:  08/26/19 1621     LACTIC ACID 2 3 mmol/L     Narrative:       Result may be elevated if tourniquet was used during collection  Blood culture #2 [[id number]] Collected:  08/26/19 1544    Lab Status: In process Specimen:  Blood from Arm, Left Updated:  08/26/19 1615    Blood culture #1 [[id number]] Collected:  08/26/19 1608    Lab Status:   In process Specimen:  Blood from Hand, Right Updated:  08/26/19 1612    Comprehensive metabolic panel [[id number]] Collected:  08/26/19 1544    Lab Status:  Final result Specimen:  Blood from Arm, Left Updated:  08/26/19 1608     Sodium 137 mmol/L      Potassium 3 7 mmol/L      Chloride 102 mmol/L      CO2 22 mmol/L      ANION GAP 13 mmol/L      BUN 18 mg/dL      Creatinine 1 06 mg/dL      Glucose 94 mg/dL      Calcium 9 3 mg/dL      AST 33 U/L      ALT 25 U/L      Alkaline Phosphatase 56 U/L      Total Protein 7 6 g/dL      Albumin 3 9 g/dL      Total Bilirubin 0 40 mg/dL      eGFR 63 ml/min/1 73sq m     Narrative:       [place] guidelines for Chronic Kidney Disease (CKD):     Stage 1 with normal or high GFR (GFR > 90 mL/min/1 73 square meters)    Stage 2 Mild CKD (GFR = 60-89 mL/min/1 73 square meters)    Stage 3A Moderate CKD (GFR = 45-59 mL/min/1 73 square meters)    Stage 3B Moderate CKD (GFR = 30-44 mL/min/1 73 square meters)    Stage 4 Severe CKD (GFR = 15-29 mL/min/1 73 square meters)    Stage 5 End Stage CKD (GFR <15 mL/min/1 73 square meters)  Note: GFR calculation is accurate only with a steady state creatinine    Lipase [[id number]]  (Normal) Collected:  08/26/19 1544    Lab Status:  Final result Specimen:  Blood from Arm, Left Updated:  08/26/19 1603     Lipase 117 u/L     Protime-INR [[id number]]  (Normal) Collected:  08/26/19 1544    Lab Status:  Final result Specimen:  Blood from Arm, Left Updated:  08/26/19 1603     Protime 12 2 seconds      INR 0 91    APTT [[id number]]  (Normal) Collected:  08/26/19 1544    Lab Status:  Final result Specimen:  Blood from Arm, Left Updated:  08/26/19 1603     PTT 24 seconds     CBC and differential [[id number]]  (Abnormal) Collected:  08/26/19 1544    Lab Status:  Final result Specimen:  Blood from Arm, Left Updated:  08/26/19 1554     WBC 17 33 Thousand/uL      RBC 4 41 Million/uL      Hemoglobin 14 3 g/dL      Hematocrit 41 2 %      MCV 93 fL      MCH 32 4 pg      MCHC 34 7 g/dL      RDW 12 5 %      MPV 9 2 fL      Platelets 469 Thousands/uL      nRBC 0 /100 WBCs      Neutrophils Relative 89 %      Immat GRANS % 0 %      Lymphocytes Relative 7 %      Monocytes Relative 2 %      Eosinophils Relative 1 %      Basophils Relative 1 %      Neutrophils Absolute 15 47 Thousands/µL      Immature Grans Absolute 0 04 Thousand/uL      Lymphocytes Absolute 1 26 Thousands/µL      Monocytes Absolute 0 36 Thousand/µL      Eosinophils Absolute 0 10 Thousand/µL      Basophils Absolute 0 10 Thousands/µL     Procalcitonin [[id number]] Collected:  08/26/19 1544    Lab Status: In process Specimen:  Blood from Arm, Left Updated:  08/26/19 1551                 CT abdomen pelvis wo contrast   Final Result by Yung Neves MD (08/26 1703)      Circumferential thickening of the rectum in keeping with a nonspecific proctitis  No bowel pneumatosis  No bowel obstruction  The study was marked in [facility] for immediate notification  Workstation performed: [id number]         XR chest portable   ED Interpretation by Ac Cummings MD (08/26 1659)   No infiltrate                   Procedures  CriticalCare Time  Performed by: Ac Cummings MD  Authorized by: Ac Cummings MD     Critical care provider statement:     Critical care time (minutes):  50    Critical care time was exclusive of:  Separately billable procedures and treating other patients and teaching time    Critical care was necessary to treat or prevent imminent or life-threatening deterioration of the following conditions:  Sepsis    Critical care was time spent personally by me on the following activities:  Obtaining history from patient or surrogate, development of treatment plan with patient or surrogate, discussions with consultants, examination of patient, evaluation of patient's response to treatment, interpretation of cardiac output measurements, ordering and performing treatments and interventions, ordering and review of laboratory studies, ordering and review of radiographic studies and re-evaluation of patient's condition  ECG 12 Lead Documentation Only  Date/Time: 8/26/2019 9:43 PM  Performed by: Pierce Doe MD  Authorized by: Pierce Doe MD     Indications / Diagnosis:  Abdominal pain  ECG reviewed by me, the ED Provider: yes    Patient location:  ED  Rate:     ECG rate assessment: tachycardic    Rhythm:     Rhythm: sinus rhythm    Ectopy:     Ectopy: none    QRS:     QRS axis:  Normal    QRS intervals:  Normal  Conduction:     Conduction: normal    ST segments:     ST segments:  Non-specific (Diffuse T-wave changes consistent with rate-related changes  )           ED Course  ED Course as of Aug 26 2147   Mon Aug 26, 2019   1544 Patient states that she has had prior CT scans with premedication and did well  1553 Review of patient's chart shows that she previously had IV contrast with pretreatment of benadryl and pepcid  No prior steroid protocol noted  No documentation of anaphylaxis noted after either of these IV contrast studies  [id number] Patient states that her feet feel itchy after the fentanyl dosage  Will give Benadryl and Pepcid at this time  I discussed case with radiologist on call    Patient with history of IV contrast allergy  Given this the ideal setting would be hydrocortisone for our prep for IV contrast   Will check lactate  If elevated will get CT scan without contrast initially and follow-up CT potentially after steroid prep  If lactate is normal we may be able to wait full prep for IV enhanced study only  [phone] Patient has a folder filled with information regarding her mast cell disorder  There is a [facility] emergency room protocols included  Veronica 1, a drug allergy form, and page 10 or included on this  Images are in media  1613 Review of records shows patent patient has previously tolerated Cipro and Flagyl for colitis  [address] patient's Mast Cell specialist per her request  I spoke with Dr Kamilla Solis  Vancomycin is a strong Mast Cell release medications  Would need to be on benadryl Q4-6 hours if vancomycin is needed  1641 CT ordered without contrast due to elevated lactate  Can re-CT if steroid prep and IV contrast-enhanced CT will provide additional information  1650 Pain is improving  BP improved with pain medication  1656 Patient used the restroom  States there was no further bleeding from rectum  1657 Antibiotics (Levaquin) being hung  1 liter NSS completed  HR improved to 101  /63  Pain improved from "12/10" to "10/10"  Additional fentanyl ordered  1702 Baseline blood pressure for patient in office 6/19 was 122/80       1704 2nd liter NSS ordered  1712 CT findings of proctitis reviewed with patient  Understands she will require admission  1751 2nd liter of IV NSS finishing now  Patient had small amount of red blood upon using toilet  Appears to be total amount of blood 5-10 mL       1840 Patient's BP 87/62 x 1 episode per nurse  Rechecking BP       1849 Blood pressure again improved to 94/59 after sitting patient up  Will change admission to Critical care  Patient has had 30mL/Kg bolus completed  Will continue IVF at 125cc/hr and monitor BP  Initial Sepsis Screening     Row Name 08/26/19 1642                Is the patient's history suggestive of a new or worsening infection? (!) Yes (Proceed)  -RP        Suspected source of infection  acute abdominal infection  -RP        Are two or more of the following signs & symptoms of infection both present and new to the patient? (!) Yes (Proceed)  -RP        Indicate SIRS criteria  Leukocytosis (WBC > 41231 IJL); Tachycardia > 90 bpm  -RP        If the answer is yes to both questions, suspicion of sepsis is present  --        If severe sepsis is present AND tissue hypoperfusion perists in the hour after fluid resuscitation or lactate > 4, the patient meets criteria for SEPTIC SHOCK  --        Are any of the following organ dysfunction criteria present within 6 hours of suspected infection and SIRS criteria that are NOT considered to be chronic conditions? (!) Yes  -RP        Organ dysfunction  Lactate > 2 0 mmol/L  -RP        Date of presentation of severe sepsis  --        Time of presentation of severe sepsis  --        Tissue hypoperfusion persists in the hour after crystalloid fluid administration, evidenced, by either:  --        Was hypotension present within one hour of the conclusion of crystalloid fluid administration?   No  -RP        Date of presentation of septic shock  --        Time of presentation of septic shock  --          User Key  (r) = Recorded By, (t) = Taken By, (c) = Cosigned By    [address] Name Provider Type    RP Kay Goodson MD Physician           Default Flowsheet Data (last 720 hours)      Sepsis Reassess     Row Name 08/26/19 5891                   Repeat Volume Status and Tissue Perfusion Assessment Performed    Repeat Volume Status and Tissue Perfusion Assessment Performed  Yes  -RP           Volume Status and Tissue Perfusion Post Fluid Resuscitation * Must Document All *    Vital Signs Reviewed (HR, RR, BP, T)  Yes  -RP        Shock Index Reviewed Yes  -RP        Arterial Oxygen Saturation Reviewed (POx, SaO2 or SpO2)  Yes (comment %)  -RP        Cardio  (!) Normal S1/S2; Regular rate and rhythm; Tachycardia  -RP        Pulmonary  Normal effort  -RP        Capillary Refill  Brisk  -RP        Peripheral Pulses  Radial  -RP        Peripheral Pulse  +2  -RP        Skin  Warm  -RP        Urine output assessed  Adequate  -RP           *OR*   Intensive Monitoring- Must Document One of the Following Four *:    Vital Signs Reviewed  --        * Central Venous Pressure (CVP or RAP)  --        * Central Venous Oxygen (SVO2, ScvO2 or Oxygen saturation via central catheter)  --        * Bedside Cardiovascular US in IVC diameter and % collapse  --        * Passive Leg Raise OR Crystalloid Challenge  --          User Key  (r) = Recorded By, (t) = Taken By, (c) = Cosigned By    Initials Name Provider Type    RP Lauri Edmonds MD Physician                MDM  Number of Diagnoses or Management Options  GI bleed:   Proctitis:   Severe sepsis [facility]):   Diagnosis management comments: 68-year-old female with severe abdominal pain and rectal bleed  Patient with fever  She is immune compromised  Sepsis antibiotics were initiated in the emergency department  Patient was given 2 L of saline wide open  3rd L was started at 125 cc/hour  Blood pressure slightly labile  One episode of blood pressure less than 90 and then rebounded immediately  Patient was a sepsis alert in the emergency department  Lactate improved after IV hydration  Heart rate improved as well  Due to lab I will blood pressure patient was admitted to the ICU         Amount and/or Complexity of Data Reviewed  Clinical lab tests: ordered and reviewed  Tests in the radiology section of CPT®: ordered and reviewed  Discuss the patient with other providers: yes  Independent visualization of images, tracings, or specimens: yes        Disposition  Final diagnoses:   GI bleed   Proctitis   Severe sepsis ([address] ) Time reflects when diagnosis was documented in both MDM as applicable and the Disposition within this note     Time User Action Codes Description Comment    8/26/2019  6:01 PM Kirill Ventura Add [K92 2] GI bleed     8/26/2019  6:01 PM Kirill Ventura Add [K62 89] Proctitis     8/26/2019  6:01 PM Traci Griffin Add [A41 9,  R65 20] Severe sepsis ([facility] [address] )     8/26/2019  8:45 PM Dub Heads Modify [K62 89] Proctitis     8/26/2019  8:45 PM Rhonda Church Add [K51 918] Ulcerative colitis with other complication, unspecified location ([facility] [address] )     8/26/2019  8:45 PM [place]ads Modify [K62 89] Proctitis     8/26/2019  8:45 PM Dub Heads Modify [K62 89] Proctitis       ED Disposition     ED Disposition Condition Date/Time Comment    Admit Stable Mon Aug 26, 2019  6:01 PM Case was discussed with dr Liane Fleming and the patient's admission status was agreed to be Admission Status: inpatient status to the service of Dr Liane Fleming           Follow-up Information    None         Current Discharge Medication List      CONTINUE these medications which have NOT CHANGED    Details   cetirizine (ZyrTEC) 10 mg tablet Take 20 mg by mouth 2 (two) times a day as needed ("relief")       diphenhydrAMINE (BENADRYL) 25 mg tablet Take 50 mg by mouth as needed for itching       hydrOXYzine HCL (ATARAX) 25 mg tablet       montelukast (SINGULAIR) 10 mg tablet Take 1 tablet (10 mg total) by mouth 2 (two) times a day  Qty: 60 tablet, Refills: 3    Associated Diagnoses: Environmental allergies      pantoprazole (PROTONIX) 40 mg tablet Take 40 mg by mouth 2 (two) times a day       Probiotic Product (PROBIOTIC DAILY PO) Probiotic   floistar      ranitidine (ZANTAC) 150 MG capsule Take 300 mg by mouth 2 (two) times a day       temazepam (RESTORIL) 30 mg capsule Take 1 capsule (30 mg total) by mouth daily at bedtime  Qty: 30 capsule, Refills: 2    Associated Diagnoses: Psychophysiological insomnia      butalbital-acetaminophen-caffeine (FIORICET,ESGIC) -40 mg per tablet Take 1 tablet by mouth every 8 (eight) hours as needed for headaches  Qty: 20 tablet, Refills: 0    Associated Diagnoses: Palpitation      cholecalciferol (VITAMIN D) 400 units/mL Take 2 5 mL (1,000 Units total) by mouth daily  Qty: 1 Bottle, Refills: 5    Associated Diagnoses: Mast cell disease      EPINEPHrine (EPIPEN 2-MARYSOL IJ) EpiPen   prn      Fexofenadine HCl (MUCINEX ALLERGY PO) Take by mouth      furosemide (LASIX) 20 mg tablet Take 20 mg by mouth daily as needed       inFLIXimab (REMICADE IV) 700 mg Every 6 weeks      levothyroxine (LEVOXYL) 50 mcg tablet Levoxyl 50 mcg tablet      LORazepam (ATIVAN) 1 mg tablet Take 1 tablet (1 mg total) by mouth every 8 (eight) hours as needed for anxiety  Qty: 90 tablet, Refills: 0    Associated Diagnoses: Anxiety      nystatin (MYCOSTATIN) 500,000 units/5 mL suspension Apply 5 mL (500,000 Units total) to the mouth or throat 4 (four) times a day  Qty: 280 mL, Refills: 0    Associated Diagnoses: History of thrush      omalizumab (XOLAIR) 150 mg Inject 300 mg under the skin every 28 days       traMADol (ULTRAM) 50 mg tablet Take 1 tablet (50 mg total) by mouth every 6 (six) hours as needed for moderate pain or severe pain  Qty: 10 tablet, Refills: 0    Comments: You cannot drive or drink alcohol while taking this medication  Associated Diagnoses: Intractable pain           No discharge procedures on file      ED Provider  Electronically Signed by           Hayley Conley MD  08/26/19 2413

## 2019-08-27 PROBLEM — R78.81 BACTEREMIA: Status: ACTIVE | Noted: 2019-08-27

## 2019-08-27 PROBLEM — D84.9 IMMUNOSUPPRESSED STATUS (HCC): Status: ACTIVE | Noted: 2019-08-27

## 2019-08-27 LAB
ANION GAP SERPL CALCULATED.3IONS-SCNC: 9 MMOL/L (ref 4–13)
ATRIAL RATE: 136 BPM
BUN SERPL-MCNC: 7 MG/DL (ref 5–25)
CALCIUM SERPL-MCNC: 7.8 MG/DL (ref 8.3–10.1)
CHLORIDE SERPL-SCNC: 107 MMOL/L (ref 100–108)
CO2 SERPL-SCNC: 26 MMOL/L (ref 21–32)
CREAT SERPL-MCNC: 0.62 MG/DL (ref 0.6–1.3)
ERYTHROCYTE [DISTWIDTH] IN BLOOD BY AUTOMATED COUNT: 13 % (ref 11.6–15.1)
GFR SERPL CREATININE-BSD FRML MDRD: 108 ML/MIN/1.73SQ M
GLUCOSE SERPL-MCNC: 78 MG/DL (ref 65–140)
HCT VFR BLD AUTO: 37.5 % (ref 34.8–46.1)
HEMOCCULT STL QL: POSITIVE
HGB BLD-MCNC: 12.6 G/DL (ref 11.5–15.4)
MAGNESIUM SERPL-MCNC: 1.9 MG/DL (ref 1.6–2.6)
MCH RBC QN AUTO: 32.7 PG (ref 26.8–34.3)
MCHC RBC AUTO-ENTMCNC: 33.6 G/DL (ref 31.4–37.4)
MCV RBC AUTO: 97 FL (ref 82–98)
PHOSPHATE SERPL-MCNC: 2.4 MG/DL (ref 2.7–4.5)
PLATELET # BLD AUTO: 335 THOUSANDS/UL (ref 149–390)
PMV BLD AUTO: 9.6 FL (ref 8.9–12.7)
POTASSIUM SERPL-SCNC: 3.3 MMOL/L (ref 3.5–5.3)
PROCALCITONIN SERPL-MCNC: 0.06 NG/ML
PROCALCITONIN SERPL-MCNC: 4.29 NG/ML
PROCALCITONIN SERPL-MCNC: 4.46 NG/ML
QRS AXIS: 86 DEGREES
QRSD INTERVAL: 64 MS
QT INTERVAL: 364 MS
QTC INTERVAL: 551 MS
RBC # BLD AUTO: 3.85 MILLION/UL (ref 3.81–5.12)
SODIUM SERPL-SCNC: 142 MMOL/L (ref 136–145)
T WAVE AXIS: 62 DEGREES
VENTRICULAR RATE: 138 BPM
WBC # BLD AUTO: 16.72 THOUSAND/UL (ref 4.31–10.16)

## 2019-08-27 PROCEDURE — 87086 URINE CULTURE/COLONY COUNT: CPT | Performed by: INTERNAL MEDICINE

## 2019-08-27 PROCEDURE — 84145 PROCALCITONIN (PCT): CPT | Performed by: NURSE PRACTITIONER

## 2019-08-27 PROCEDURE — 94760 N-INVAS EAR/PLS OXIMETRY 1: CPT

## 2019-08-27 PROCEDURE — 99223 1ST HOSP IP/OBS HIGH 75: CPT | Performed by: INTERNAL MEDICINE

## 2019-08-27 PROCEDURE — 99233 SBSQ HOSP IP/OBS HIGH 50: CPT | Performed by: INTERNAL MEDICINE

## 2019-08-27 PROCEDURE — 93010 ELECTROCARDIOGRAM REPORT: CPT | Performed by: INTERNAL MEDICINE

## 2019-08-27 PROCEDURE — 80048 BASIC METABOLIC PNL TOTAL CA: CPT | Performed by: NURSE PRACTITIONER

## 2019-08-27 PROCEDURE — 83735 ASSAY OF MAGNESIUM: CPT | Performed by: NURSE PRACTITIONER

## 2019-08-27 PROCEDURE — 85027 COMPLETE CBC AUTOMATED: CPT | Performed by: NURSE PRACTITIONER

## 2019-08-27 PROCEDURE — 87493 C DIFF AMPLIFIED PROBE: CPT | Performed by: INTERNAL MEDICINE

## 2019-08-27 PROCEDURE — 84100 ASSAY OF PHOSPHORUS: CPT | Performed by: NURSE PRACTITIONER

## 2019-08-27 RX ORDER — DIPHENHYDRAMINE HYDROCHLORIDE 50 MG/ML
50 INJECTION INTRAMUSCULAR; INTRAVENOUS ONCE
Status: COMPLETED | OUTPATIENT
Start: 2019-08-27 | End: 2019-08-27

## 2019-08-27 RX ORDER — FLUCONAZOLE 40 MG/ML
200 POWDER, FOR SUSPENSION ORAL DAILY
Status: COMPLETED | OUTPATIENT
Start: 2019-08-28 | End: 2019-09-09

## 2019-08-27 RX ORDER — PANTOPRAZOLE SODIUM 40 MG/1
40 TABLET, DELAYED RELEASE ORAL
Status: DISCONTINUED | OUTPATIENT
Start: 2019-08-27 | End: 2019-09-12 | Stop reason: HOSPADM

## 2019-08-27 RX ORDER — CETIRIZINE HYDROCHLORIDE 10 MG/1
10 TABLET ORAL DAILY
Status: DISCONTINUED | OUTPATIENT
Start: 2019-08-27 | End: 2019-08-27

## 2019-08-27 RX ORDER — FLUCONAZOLE 40 MG/ML
400 POWDER, FOR SUSPENSION ORAL ONCE
Status: COMPLETED | OUTPATIENT
Start: 2019-08-27 | End: 2019-08-27

## 2019-08-27 RX ORDER — MONTELUKAST SODIUM 10 MG/1
10 TABLET ORAL DAILY
Status: DISCONTINUED | OUTPATIENT
Start: 2019-08-27 | End: 2019-08-27

## 2019-08-27 RX ORDER — DIPHENHYDRAMINE HYDROCHLORIDE 50 MG/ML
25 INJECTION INTRAMUSCULAR; INTRAVENOUS ONCE
Status: COMPLETED | OUTPATIENT
Start: 2019-08-27 | End: 2019-08-27

## 2019-08-27 RX ORDER — TEMAZEPAM 15 MG/1
30 CAPSULE ORAL
Status: DISCONTINUED | OUTPATIENT
Start: 2019-08-27 | End: 2019-09-01

## 2019-08-27 RX ORDER — CETIRIZINE HYDROCHLORIDE 10 MG/1
20 TABLET ORAL 2 TIMES DAILY
Status: DISCONTINUED | OUTPATIENT
Start: 2019-08-27 | End: 2019-09-12 | Stop reason: HOSPADM

## 2019-08-27 RX ORDER — DIPHENHYDRAMINE HYDROCHLORIDE 50 MG/ML
25 INJECTION INTRAMUSCULAR; INTRAVENOUS EVERY 6 HOURS PRN
Status: DISCONTINUED | OUTPATIENT
Start: 2019-08-27 | End: 2019-08-27

## 2019-08-27 RX ORDER — SACCHAROMYCES BOULARDII 250 MG
250 CAPSULE ORAL 2 TIMES DAILY
Status: DISCONTINUED | OUTPATIENT
Start: 2019-08-27 | End: 2019-09-06

## 2019-08-27 RX ORDER — DIPHENHYDRAMINE HYDROCHLORIDE 50 MG/ML
50 INJECTION INTRAMUSCULAR; INTRAVENOUS EVERY 4 HOURS PRN
Status: DISCONTINUED | OUTPATIENT
Start: 2019-08-27 | End: 2019-09-01

## 2019-08-27 RX ORDER — RANITIDINE 150 MG/1
150 TABLET ORAL EVERY 12 HOURS SCHEDULED
Status: DISCONTINUED | OUTPATIENT
Start: 2019-08-27 | End: 2019-08-27

## 2019-08-27 RX ORDER — ACETAMINOPHEN 325 MG/1
650 TABLET ORAL EVERY 6 HOURS PRN
Status: DISCONTINUED | OUTPATIENT
Start: 2019-08-27 | End: 2019-09-12 | Stop reason: HOSPADM

## 2019-08-27 RX ORDER — DIPHENHYDRAMINE HCL 25 MG
25 TABLET ORAL EVERY 6 HOURS PRN
Status: DISCONTINUED | OUTPATIENT
Start: 2019-08-27 | End: 2019-08-27

## 2019-08-27 RX ORDER — MONTELUKAST SODIUM 10 MG/1
10 TABLET ORAL 2 TIMES DAILY
Status: DISCONTINUED | OUTPATIENT
Start: 2019-08-27 | End: 2019-09-12 | Stop reason: HOSPADM

## 2019-08-27 RX ORDER — HYDROMORPHONE HCL/PF 1 MG/ML
0.5 SYRINGE (ML) INJECTION EVERY 4 HOURS PRN
Status: DISCONTINUED | OUTPATIENT
Start: 2019-08-27 | End: 2019-08-30

## 2019-08-27 RX ORDER — ACETAMINOPHEN 325 MG/1
650 TABLET ORAL EVERY 6 HOURS PRN
Status: DISCONTINUED | OUTPATIENT
Start: 2019-08-27 | End: 2019-08-27

## 2019-08-27 RX ORDER — LEVOTHYROXINE SODIUM 0.05 MG/1
50 TABLET ORAL
Status: DISCONTINUED | OUTPATIENT
Start: 2019-08-27 | End: 2019-09-05

## 2019-08-27 RX ORDER — RANITIDINE 150 MG/1
300 TABLET ORAL EVERY 12 HOURS SCHEDULED
Status: DISCONTINUED | OUTPATIENT
Start: 2019-08-27 | End: 2019-09-01

## 2019-08-27 RX ORDER — POTASSIUM CHLORIDE AND SODIUM CHLORIDE 900; 300 MG/100ML; MG/100ML
125 INJECTION, SOLUTION INTRAVENOUS CONTINUOUS
Status: DISCONTINUED | OUTPATIENT
Start: 2019-08-27 | End: 2019-08-28

## 2019-08-27 RX ORDER — HYDROMORPHONE HCL/PF 1 MG/ML
0.5 SYRINGE (ML) INJECTION ONCE
Status: COMPLETED | OUTPATIENT
Start: 2019-08-27 | End: 2019-08-27

## 2019-08-27 RX ADMIN — CETIRIZINE HYDROCHLORIDE 10 MG: 10 TABLET, FILM COATED ORAL at 11:48

## 2019-08-27 RX ADMIN — HYDROMORPHONE HYDROCHLORIDE 0.5 MG: 1 INJECTION, SOLUTION INTRAMUSCULAR; INTRAVENOUS; SUBCUTANEOUS at 01:56

## 2019-08-27 RX ADMIN — DIPHENHYDRAMINE HYDROCHLORIDE 50 MG: 50 INJECTION, SOLUTION INTRAMUSCULAR; INTRAVENOUS at 14:33

## 2019-08-27 RX ADMIN — POTASSIUM CHLORIDE AND SODIUM CHLORIDE 125 ML/HR: 900; 300 INJECTION, SOLUTION INTRAVENOUS at 11:17

## 2019-08-27 RX ADMIN — BUTALBITAL, ACETAMINOPHEN AND CAFFEINE 1 TABLET: 50; 325; 40 TABLET ORAL at 16:42

## 2019-08-27 RX ADMIN — HYDROMORPHONE HYDROCHLORIDE 0.5 MG: 1 INJECTION, SOLUTION INTRAMUSCULAR; INTRAVENOUS; SUBCUTANEOUS at 13:45

## 2019-08-27 RX ADMIN — CETIRIZINE HYDROCHLORIDE 20 MG: 10 TABLET ORAL at 17:54

## 2019-08-27 RX ADMIN — SODIUM CHLORIDE 125 ML/HR: 0.9 INJECTION, SOLUTION INTRAVENOUS at 04:56

## 2019-08-27 RX ADMIN — RANITIDINE 150 MG: 150 TABLET ORAL at 11:39

## 2019-08-27 RX ADMIN — MONTELUKAST 10 MG: 10 TABLET, FILM COATED ORAL at 17:51

## 2019-08-27 RX ADMIN — CEFEPIME HYDROCHLORIDE 2000 MG: 2 INJECTION, POWDER, FOR SOLUTION INTRAVENOUS at 23:30

## 2019-08-27 RX ADMIN — ACETAMINOPHEN 650 MG: 325 TABLET, FILM COATED ORAL at 04:57

## 2019-08-27 RX ADMIN — METRONIDAZOLE 500 MG: 500 INJECTION, SOLUTION INTRAVENOUS at 09:02

## 2019-08-27 RX ADMIN — ENOXAPARIN SODIUM 40 MG: 40 INJECTION SUBCUTANEOUS at 11:39

## 2019-08-27 RX ADMIN — HYDROMORPHONE HYDROCHLORIDE 0.5 MG: 1 INJECTION, SOLUTION INTRAMUSCULAR; INTRAVENOUS; SUBCUTANEOUS at 18:05

## 2019-08-27 RX ADMIN — RANITIDINE 300 MG: 150 TABLET ORAL at 17:51

## 2019-08-27 RX ADMIN — PANTOPRAZOLE SODIUM 40 MG: 40 TABLET, DELAYED RELEASE ORAL at 17:52

## 2019-08-27 RX ADMIN — DIPHENHYDRAMINE HCL 25 MG: 25 TABLET, FILM COATED ORAL at 03:20

## 2019-08-27 RX ADMIN — HYDROMORPHONE HYDROCHLORIDE 0.5 MG: 1 INJECTION, SOLUTION INTRAMUSCULAR; INTRAVENOUS; SUBCUTANEOUS at 07:52

## 2019-08-27 RX ADMIN — DIPHENHYDRAMINE HYDROCHLORIDE 50 MG: 50 INJECTION, SOLUTION INTRAMUSCULAR; INTRAVENOUS at 19:18

## 2019-08-27 RX ADMIN — CEFEPIME HYDROCHLORIDE 2000 MG: 2 INJECTION, POWDER, FOR SOLUTION INTRAVENOUS at 13:45

## 2019-08-27 RX ADMIN — POTASSIUM CHLORIDE AND SODIUM CHLORIDE 125 ML/HR: 900; 300 INJECTION, SOLUTION INTRAVENOUS at 19:20

## 2019-08-27 RX ADMIN — METRONIDAZOLE 500 MG: 500 INJECTION, SOLUTION INTRAVENOUS at 01:12

## 2019-08-27 RX ADMIN — DIPHENHYDRAMINE HYDROCHLORIDE 25 MG: 50 INJECTION, SOLUTION INTRAMUSCULAR; INTRAVENOUS at 09:02

## 2019-08-27 RX ADMIN — TEMAZEPAM 30 MG: 15 CAPSULE ORAL at 21:37

## 2019-08-27 RX ADMIN — DIPHENHYDRAMINE HYDROCHLORIDE 25 MG: 50 INJECTION, SOLUTION INTRAMUSCULAR; INTRAVENOUS at 04:57

## 2019-08-27 RX ADMIN — FAMOTIDINE 20 MG: 20 TABLET ORAL at 09:03

## 2019-08-27 RX ADMIN — SODIUM PHOSPHATE, MONOBASIC, MONOHYDRATE 12 MMOL: 276; 142 INJECTION, SOLUTION INTRAVENOUS at 11:40

## 2019-08-27 RX ADMIN — MONTELUKAST 10 MG: 10 TABLET, FILM COATED ORAL at 09:03

## 2019-08-27 RX ADMIN — DIPHENHYDRAMINE HYDROCHLORIDE 50 MG: 50 INJECTION, SOLUTION INTRAMUSCULAR; INTRAVENOUS at 11:17

## 2019-08-27 RX ADMIN — DIPHENHYDRAMINE HYDROCHLORIDE 50 MG: 50 INJECTION, SOLUTION INTRAMUSCULAR; INTRAVENOUS at 23:25

## 2019-08-27 RX ADMIN — Medication 250 MG: at 17:53

## 2019-08-27 RX ADMIN — FLUCONAZOLE 400 MG: 40 POWDER, FOR SUSPENSION ORAL at 17:53

## 2019-08-27 NOTE — ASSESSMENT & PLAN NOTE
· Severe sepsis was present on admission and secondary to gram-negative bacteremia  · Change antibiotics to IV cefepime  · Consult Infectious Disease  · Await the blood culture speciation and sensitivity results  · The lactic acidosis has resolved  · Follow the procalcitonin level

## 2019-08-27 NOTE — CONSULTS
Consultation - [facility]  Ariana Obrien 53 y o  female MRN: [id number]  Unit/Bed#:  Encounter: [id number]        ASSESSMENT/PLAN:   53y o  year old female with a PMH of mast cell activation syndrome, fibromyalgia and ulcerative colitis presented to the hospital for abdominal pain and chills and was found to have severe sepsis  1  Ulcerative colitis  2  Bacteremia  3  Severe sepsis  4  Abdominal pain  5  Rectal bleeding   -she has a history of ulcerative colitis and follows with Gastroenterology in [place], she is currently on Remicade every 6 weeks and had an infusion 1 5 weeks ago  She states she has been feeling poorly for the past month but had acute worsening of her abdominal pain and rectal bleeding yesterday as well as fever and chills so she presented to the hospital    -She was found to have sepsis with elevated WBC, tachycardia and fever  She has had previous admissions for sepsis within the past year at this hospital and [facility]  -Blood cultures were positive for gram-negative rods  The ID team has been consulted  -CT of the abdomen and pelvis without contrast showed circumferential thickening in the rectum without pneumatosis, bowel obstruction or free air in the abdomen seen    -C diff stool test, stool enteric bacterial panel are pending    -Would not recommend starting steroids for UC in the setting of bacteriemia and sepsis  -Clear liquid diet for now  -Consider flexible sigmoidoscopy to evaluate the extent of her disease; will discuss with Dr Sudhir Wu  6  Esophageal candidiasis   -She states she was diagnosed with esophageal candidiasis which she has been battling, this was documented on a previous hospital admission as well  -Recommend Diflucan 400mg today then 200mg for 13 days for a total of 2 weeks of treatment  Her creatinine is WNL  -Ordered suspension rather than tablets due to corn allergy      Inpatient consult to gastroenterology  Consult performed by: Alejandrina Nelson PA-C  Consult ordered by: Karen Pitts DO          Reason for Consult / Principal Problem: Severe sepsis [facility])    HPI: Aixa Mario is a 53y o  year old female with a PMH of mast cell activation syndrome, fibromyalgia and ulcerative colitis presented to the hospital for abdominal pain and chills and was found to have severe sepsis  She reports that her abdominal pain has been going on for about a month, she states that she has also been constipated which she feels is secondary to inflammation in the rectum because she does pass small diameter soft stools  She states that she was seeing some rectal bleeding but yesterday the rectal bleeding became acutely worse  She states she has been more fatigued recently as well  She states the abdominal pain is predominantly in the lower left quadrant and did began about a month ago but is currently worse  She denies any vomiting  She states she has been having some bowel incontinence  She receives Remicade every 6 weeks and she recently saw her primary GI physician because she felt that the Remicade was no longer working, she planned on having Remicade levels and antibodies drawn prior to her next infusion  Her most recent infusion was about a week and a half ago  She states that she had a reaction during this infusion but she insisted that the infusion be finished because she was concerned that she would get worse from an ulcerative colitis standpoint  She states she was 1st diagnosed with Crohn's in [address] [place] [address] but this diagnosis was changed ulcerative colitis about 3 years later  She denies history of bowel resection  She has also been on mesalamine and Imuran in the past, mesalamine did not work for her and Imuran caused liver failure per her report  She has been struggling with chronic esophageal candidiasis and vaginal candidiasis and does admit to odynophagia    She tried nystatin swish and spit without improvement, she was recommended to try a 2 week course of fluconazole but unfortunately her PCP was out of the office all last week so she never received this  She states that she has also been receiving treatment for immune deficiency, she has received 3 different pneumonia vaccines without forming any antibodies so she is going for IgG infusions  She also receives IV hydration every other week, she states even without diarrhea she is unable to stay hydrated and she is not sure why because she drinks a lot of water  She reports she has had several admissions for sepsis, per her chart she had a hospitalization in August of last year where she was diagnosed with septic shock and heart failure and had colitis with possible perforation with severe colon inflammation, she was hospitalized for 1 month and spent a week in the ICU  She had exploratory surgery in December  She states she had her tonsils removed in April which is when the esophageal candidiasis began  She denies any history of C diff infection  Her primary gastroenterologist is at [facility]  Surgical history includes hysterectomy for endometriosis in 2007, cholecystectomy in 7798 complicated by pancreatitis, ankle reconstruction in 2010 and then removal of hardware later that year, appendectomy and removal of ovarian cyst in 2011, exploratory laparotomy and Tae (no abnormalities found) in 2017 and tonsillectomy in April 2019  She was also admitted to [facility] this May and had evaluation for dysphagia including an EGD was reportedly normal and a barium swallow reportedly showing esophageal dysmotility and moderate volume reflux  Patient states she was diagnosed with EoE as well and started on budesonide last year  She had an ERCP on 09/16/2017 showing a widely open sphincter of Oddi    She had a colonoscopy 9/12/17 which was normal, no active colitis was seen and her TI appeared normal   She also had an upper endoscopy at that time which was entirely normal   She reportedly had a colonoscopy since then showing colitis and was started on Remicade, unfortunately this report is not available  Review of Systems: as per HPI  Review of Systems   Constitutional: Positive for appetite change, chills and unexpected weight change  Negative for activity change, fatigue and fever  HENT: Negative for mouth sores, sore throat and trouble swallowing  Respiratory: Negative for shortness of breath  Cardiovascular: Negative for chest pain  Gastrointestinal: Positive for abdominal pain, blood in stool, constipation and diarrhea  Negative for abdominal distention, nausea and vomiting  Skin: Negative for color change, pallor, rash and wound  Neurological: Negative for tremors and syncope  All other systems reviewed and are negative  Historical Information   Past Medical History:   Diagnosis Date    Anxiety     Asthma     Chronic kidney disease     Deep vein thrombosis (HCC)     Disease of thyroid gland     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    Generalized anxiety disorder 8/26/2017    Heart murmur     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HCC)     Migraine     Myocardial infarction ([facility] )     NSTEMI  pt denies, documented in cardio note    Pancreatitis     sphinger of     Psychiatric disorder     RA (rheumatoid arthritis) ([facility] )     Ulcerative colitis ([facility] )      Past Surgical History:   Procedure Laterality Date    APPENDECTOMY      CHOLECYSTECTOMY      EGD AND COLONOSCOPY N/A 9/12/2017    Procedure: EGD AND COLONOSCOPY;  Surgeon: Tahira Denson MD;  [facility]: [facility]; Service: Gastroenterology    ERCP N/A 9/15/2017    Procedure: ENDOSCOPIC RETROGRADE CHOLANGIOPANCREATOGRAPHY (ERCP); Surgeon: Osmin Feliz MD;  [facility];   Service: Gastroenterology    HYSTERECTOMY      KNEE SURGERY Right     LAPAROSCOPIC ENDOMETRIOSIS FULGURATION      ORIF ANKLE FRACTURE Left     LA KNEE SCOPE,MED/LAT MENISECTOMY Left 5/12/2017 Procedure: POSSIBLE MEDIAL MENISECTOMY;  Surgeon: William West MD;  Location: [facility];  Service: Orthopedics    [address] CART Left 5/12/2017    Procedure: KNEE ARTHROSCOPY EVALUATION, CHONDROPLASTY;  Surgeon: William West MD;  [facility];  Service: Orthopedics    PA LAP,DIAGNOSTIC ABDOMEN N/A 12/12/2017    Procedure: LAPAROSCOPY DIAGNOSTIC  WITH LYSIS OF ADHESIONS;  Surgeon: Greg Gaitan DO;  Location:  MAIN OR;  Service: General     Social History   Social History     Substance and Sexual Activity   Alcohol Use Never    Frequency: Never    Binge frequency: Never     Social History     Substance and Sexual Activity   Drug Use Not Currently     Social History     Tobacco Use   Smoking Status Never Smoker   Smokeless Tobacco Never Used     Family History   Problem Relation Age of Onset    Ulcerative colitis Mother     Heart failure Father     Neuropathy Father     Macular degeneration Father     Diabetes Father     Asthma Daughter     Hypothyroidism Daughter     Heart disease Maternal Grandmother     Arthritis Maternal Grandmother     Arthritis Paternal Grandmother     Macular degeneration Paternal Grandmother     Lymphoma Paternal Grandfather     Heart failure Paternal Aunt     Heart failure Paternal [de-identified]     Breast cancer Paternal Aunt 53    Breast cancer additional onset Paternal Aunt 62    Hypothyroidism Paternal Aunt     Breast cancer Maternal Aunt     No Known Problems Maternal Grandfather        Meds/Allergies     Facility-Administered Medications Prior to Admission   Medication    cyanocobalamin injection 1,000 mcg    cyanocobalamin injection 1,000 mcg    cyanocobalamin injection 1,000 mcg     Medications Prior to Admission   Medication    cetirizine (ZyrTEC) 10 mg tablet    diphenhydrAMINE (BENADRYL) 25 mg tablet    hydrOXYzine HCL (ATARAX) 25 mg tablet    montelukast (SINGULAIR) 10 mg tablet    pantoprazole (PROTONIX) 40 mg tablet    Probiotic Product (PROBIOTIC DAILY PO)    ranitidine (ZANTAC) 150 MG capsule    temazepam (RESTORIL) 30 mg capsule    butalbital-acetaminophen-caffeine (FIORICET,ESGIC) -40 mg per tablet    cholecalciferol (VITAMIN D) 400 units/mL    EPINEPHrine (EPIPEN 2-MARYSOL IJ)    Fexofenadine HCl (MUCINEX ALLERGY PO)    furosemide (LASIX) 20 mg tablet    inFLIXimab (REMICADE IV)    levothyroxine (LEVOXYL) 50 mcg tablet    LORazepam (ATIVAN) 1 mg tablet    nystatin (MYCOSTATIN) 500,000 units/5 mL suspension    omalizumab (XOLAIR) 150 mg    traMADol (ULTRAM) 50 mg tablet     Current Facility-Administered Medications   Medication Dose Route Frequency    acetaminophen (TYLENOL) tablet 650 mg  650 mg Oral Q6H PRN    butalbital-acetaminophen-caffeine (FIORICET,ESGIC) -40 mg per tablet 1 tablet  1 tablet Oral Q8H PRN    cefepime (MAXIPIME) 2,000 mg in dextrose 5 % 50 mL IVPB  2,000 mg Intravenous Q12H    cetirizine (ZyrTEC) tablet 20 mg  20 mg Oral BID    diphenhydrAMINE (BENADRYL) injection 50 mg  50 mg Intravenous Q4H PRN    enoxaparin (LOVENOX) subcutaneous injection 40 mg  40 mg Subcutaneous Q24H    HYDROmorphone (DILAUDID) injection 0 5 mg  0 5 mg Intravenous Q4H PRN    levothyroxine tablet 50 mcg  50 mcg Oral Early Morning    LORazepam (ATIVAN) tablet 1 mg  1 mg Oral Q8H PRN    montelukast (SINGULAIR) tablet 10 mg  10 mg Oral BID    morphine injection 1 mg  1 mg Intravenous Q4H PRN    pantoprazole (PROTONIX) EC tablet 40 mg  40 mg Oral BID AC    ranitidine (ZANTAC) tablet 300 mg  300 mg Oral Q12H KARISSA    saccharomyces boulardii (FLORASTOR) capsule 250 mg  250 mg Oral BID    sodium chloride 0 9 % with KCl 40 mEq/L infusion (premix)  125 mL/hr Intravenous Continuous    sodium phosphate 12 mmol in sodium chloride 0 9 % 100 mL Infusion  12 mmol Intravenous Once    temazepam (RESTORIL) capsule 30 mg  30 mg Oral HS       Allergies   Allergen Reactions    Amitriptyline Anaphylaxis     According to the patient she had nphylaxis    Aspergillus Fumigatus Other (See Comments), Hives and Swelling    Azathioprine Other (See Comments) and Anaphylaxis     pancreatitis  According to patient she needed Epipen    Sumatriptan Anaphylaxis     Bradycardia, sob, back pressure, head pain,throat tightness  According to the patient she had anphylaxis    Contrast [Iodinated Diagnostic Agents]      Pt states needs to be premedicated prior to injection    Corn Dextrin      Any corn based products    Corn Oil Hives    Gelatin     Histamine      Intolerance      Ibuprofen Hives and Throat Swelling    Malto Dextrin [Dextrin]     Other      Gel caps, maltodextrose, dextrose    Penicillins Hives and Throat Swelling    Penicillium Notatum     Sulfa Antibiotics Hives and Other (See Comments)    Sulfate     Sulfites        Objective     Blood pressure 100/63, pulse 76, temperature 97 7 °F (36 5 °C), temperature source Temporal, resp  rate 18, height 5' 5" (1 651 m), weight 63 2 kg (139 lb 5 3 oz), SpO2 98 %, not currently breastfeeding  Intake/Output Summary (Last 24 hours) at 8/27/2019 1327  Last data filed at 8/27/2019 1204  Gross per 24 hour   Intake 4508 33 ml   Output 2300 ml   Net 2208 33 ml       PHYSICAL EXAM     Physical Exam   Constitutional: She is oriented to person, place, and time  She appears well-developed and well-nourished  No distress  HENT:   Head: Normocephalic and atraumatic  Eyes: Right eye exhibits no discharge  Left eye exhibits no discharge  No scleral icterus  Neck: Neck supple  No tracheal deviation present  Cardiovascular: Normal rate, regular rhythm and normal heart sounds  Exam reveals no gallop and no friction rub  No murmur heard  Pulmonary/Chest: Effort normal and breath sounds normal    Abdominal: Soft  She exhibits no distension and no mass  There is tenderness (throughout abd, worse in LLQ and RUQ, )  There is no rebound and no guarding     Bowel sounds normoactive to hyperactive   Neurological: She is alert and oriented to person, place, and time  Skin: Skin is warm and dry  Psychiatric: She has a normal mood and affect  Lab Results:   CBC:   Lab Results   Component Value Date    WBC 16 72 (H) 08/27/2019    HGB 12 6 08/27/2019    HCT 37 5 08/27/2019    MCV 97 08/27/2019     08/27/2019    MCH 32 7 08/27/2019    MCHC 33 6 08/27/2019    RDW 13 0 08/27/2019    MPV 9 6 08/27/2019    NRBC 0 08/26/2019   ,   CMP:   Lab Results   Component Value Date    K 3 3 (L) 08/27/2019     08/27/2019    CO2 26 08/27/2019    BUN 7 08/27/2019    CREATININE 0 62 08/27/2019    CALCIUM 7 8 (L) 08/27/2019    AST 33 08/26/2019    ALT 25 08/26/2019    ALKPHOS 56 08/26/2019    EGFR 108 08/27/2019   ,   Lipase:   Lab Results   Component Value Date    LIPASE 117 08/26/2019   ,  PT/INR:   Lab Results   Component Value Date    INR 0 91 08/26/2019   ,   Troponin: No results found for: TROPONINI,   Magnesium: No components found for: MAG,   Phosphorous:   Lab Results   Component Value Date    PHOS 2 4 (L) 08/27/2019     Imaging Studies: I have personally reviewed pertinent reports  CT ABDOMEN AND PELVIS WITHOUT IV CONTRAST     INDICATION:   Abdominal pain, GI bleed, elevated lactate  [place] "Pt states she hasn't been feeling good x3 weeks with blood in stool  States "everything hurts," states hx of diverticulitis  Started this am with fever"     COMPARISON:  None      TECHNIQUE:  CT examination of the abdomen and pelvis was performed without intravenous contrast   Axial, sagittal, and coronal 2D reformatted images were created from the source data and submitted for interpretation       Radiation dose length product (DLP) for this visit:  454 99 mGy-cm   This examination, like all CT scans performed in the [facility], was performed utilizing techniques to minimize radiation dose exposure, including the use of iterative   reconstruction and automated exposure control     Enteric contrast was administered       FINDINGS:     ABDOMEN     LOWER CHEST:  No clinically significant abnormality identified in the visualized lower chest      LIVER/BILIARY TREE:  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      GALLBLADDER:  Gallbladder is surgically absent      SPLEEN:  Unremarkable      PANCREAS:  Unremarkable      ADRENAL GLANDS:  Unremarkable      KIDNEYS/URETERS:  Nonobstructive 4 mm left renal lower pole calculus  Nonobstructive 1 mm upper pole right renal calculus      STOMACH AND BOWEL:  Circumferential thickening of the rectum in keeping with a nonspecific proctitis  No bowel pneumatosis  No bowel obstruction      APPENDIX:  No findings to suggest appendicitis      ABDOMINOPELVIC CAVITY:  No ascites or free intraperitoneal air  No lymphadenopathy      VESSELS:  Unremarkable for patient's age      PELVIS     REPRODUCTIVE ORGANS:  Unremarkable for patient's age      URINARY BLADDER:  Unremarkable      ABDOMINAL WALL/INGUINAL REGIONS:  Unremarkable      OSSEOUS STRUCTURES:  No acute fracture or destructive osseous lesion      IMPRESSION:     Circumferential thickening of the rectum in keeping with a nonspecific proctitis  No bowel pneumatosis  No bowel obstruction          Patient was seen and examined by Dr Austin Sanders  All [facility] medical decisions were made by Dr Austin Sanders  Thank you for allowing us to participate in the care of this present patient  We will follow-up with you closely

## 2019-08-27 NOTE — ASSESSMENT & PLAN NOTE
White blood count 17 33 on admission to emergency  Serial procalcitonin  Trend CBC  Blood cultures pending

## 2019-08-27 NOTE — ASSESSMENT & PLAN NOTE
· Change antibiotics to IV cefepime  · Consult Infectious Disease  · Await the blood culture speciation and sensitivity results

## 2019-08-27 NOTE — ASSESSMENT & PLAN NOTE
· Replete with IV potassium chloride supplementation  · Follow the potassium level and magnesium level

## 2019-08-27 NOTE — NUTRITION
Patient states she has many food intolerances due to dysphagia she developed following a tonsillectomy and due to her mast cell activation  She states she consumes soft foods at home and if she has any dry foods such as toast she dips them in a liquid first  She reports following a low histamine diet and states she has been working with a nutritionist to help her eliminate foods that cause her to have allergic reactions  She states she cannot have any foods/beverages with corn, corn oil or maltodextrin  She states there are times when she can tolerate eggs and other times she cannot  She reports the last time she had eggs she had a bad reaction  She also reports a reaction to kidney beans, and sometimes tomatoes  She states she consumes Naked shakes as these do not have any corn by product  Wt loss was reported however wt remains stable for the last year  Pt states if the hostesses read to her she will be able to identify foods she knows she can tolerate  RD also notified kitchen to read to pt for all meals and also avoid any type of cross contamination with corn products  Pt cannot have any type of supplement on our formulary due to all containing some type of corn by product  Recommend advance to Low histamine diet when medically stable  Nutrition services will continue to monitor

## 2019-08-27 NOTE — ASSESSMENT & PLAN NOTE
· The patient is followed at [facility]  · Continue her medication regimen per [facility]

## 2019-08-27 NOTE — UTILIZATION REVIEW
Initial Clinical Review    Admission: Date/Time/Statement: Inpatient Admission Orders (From admission, onward)     Ordered        08/26/19 1854  Inpatient Admission (expected length of stay for this patient Order details is greater than two midnights)  Once                   Orders Placed This Encounter   Procedures    Inpatient Admission (expected length of stay for this patient Order details is greater than two midnights)     Standing Status:   Standing     Number of Occurrences:   1     Order Specific Question:   Admitting Physician     Answer:   Jet Brown     Order Specific Question:   Level of Care     Answer:   Critical Care [15]     Order Specific Question:   Estimated length of stay     Answer:   More than 2 Midnights     Order Specific Question:   Certification     Answer:   I certify that inpatient services are medically necessary for this patient for a duration of greater than two midnights  See H&P and MD Progress Notes for additional information about the patient's course of treatment  ED Arrival Information     Expected Arrival Acuity Means of Arrival Escorted By Service Admission Type    - 8/26/2019 15:09 Urgent Walk-In Self Hospitalist Urgent    Arrival Complaint    rectal bleeding/fever        Chief Complaint   Patient presents with    Rectal Bleeding     Pt states she hasn't been feeling good x3 weeks with blood in stool  States "everything hurts," states hx of diverticulitis  Started this am with fever  Assessment/Plan: 53 yr old female to the ed from home with c/o llq pain which started a few days ago with fever and chills  She does take remicade  q6 weeks for ulcerative colitis   She has not been feeling well for the past few week which increased her sleep persistence  She also has bright red blood per rectum with 1 bm  and some blood leakage from rectum without a bowel movement   Ct of abd/pelvis--Circumferential thickening of the rectum in keeping with a nonspecific proctitis  No bowel pneumatosis  No bowel obstruction   ekg---Sinus tachycardia  ST & T wave abnormality, consider inferior ischemia  Abnormal ECG  When compared with ECG of 26-AUG-2019 15:34,  T wave inversion no longer evident in Inferior leads  T wave inversion less evident in Anterolateral leads    She is being admitted as an inpatient with proctitis  And the plan is antibiotics, cont crdiopulm monitoring, hemoccult all stools repeat h&h trend cbc daily, iv nss at 125 abd pain- same with analgesic as needed   severe sepsis admit to icu step down 1- cont cardiopulm monitoring  --trend lactic acid , blood culures    ED Triage Vitals [08/26/19 1520]   Temperature Pulse Respirations Blood Pressure SpO2   (!) 102 6 °F (39 2 °C) (!) 137 (!) 24 141/73 98 %      Temp Source Heart Rate Source Patient Position - Orthostatic VS BP Location FiO2 (%)   Temporal Monitor Lying Right arm --      Pain Score       Worst Possible Pain        Wt Readings from Last 1 Encounters:   08/27/19 63 2 kg (139 lb 5 3 oz)     Additional Vital Signs:   /26/19 2016  99 5 °F (37 5 °C)  --  --  --  --  --  --  --   08/26/19 2000  --  98  22  101/59  --  96 %  None (Room air)  Lying   08/26/19 1900  --  90  20  103/60  --  98 %  None (Room air)  Sitting   08/26/19 1845  --  92  21  94/59  --  97 %  --  --   08/26/19 1830  --  89  22  89/51Abnormal   --  99 %  --  --   08/26/19 1800  --  92  22  100/62  --  98 %  --  --   08/26/19 1730  --  101  21  101/62  --  98 %  --  --   08/26/19 1715  --  98  20  91/55  --  97 %  --  --   08/26/19 1711  100 5 °F (38 1 °C)  99  25Abnormal   96/50  --  97 %  None (Room air)  Lying   08/26/19 1654  --  101  20  102/63  --  --  --  --   08/26/19 1630  --  112Abnormal   21  109/67  --  90 %  --  --   08/26/19 1615  --  113Abnormal   26Abnormal   127/75  --  98 %  --  --   08/26/19 1520  102 6 °F (39 2 °C)Abnormal   137Abnormal   24Abnormal   141/73  --  98 %           Pertinent Labs/Diagnostic Test Results: Results from last 7 days   Lab Units 08/27/19  0512 08/26/19  2336 08/26/19  1544   WBC Thousand/uL 16 72*  --  17 33*   HEMOGLOBIN g/dL 12 6 13 1 14 3   HEMATOCRIT % 37 5 39 2 41 2   PLATELETS Thousands/uL 335 337 352   NEUTROS ABS Thousands/µL  --   --  15 47*         Results from last 7 days   Lab Units 08/27/19  0512 08/26/19  1544   SODIUM mmol/L 142 137   POTASSIUM mmol/L 3 3* 3 7   CHLORIDE mmol/L 107 102   CO2 mmol/L 26 22   ANION GAP mmol/L 9 13   BUN mg/dL 7 18   CREATININE mg/dL 0 62 1 06   EGFR ml/min/1 73sq m 108 63   CALCIUM mg/dL 7 8* 9 3   MAGNESIUM mg/dL 1 9  --    PHOSPHORUS mg/dL 2 4*  --      Results from last 7 days   Lab Units 08/26/19  1544   AST U/L 33   ALT U/L 25   ALK PHOS U/L 56   TOTAL PROTEIN g/dL 7 6   ALBUMIN g/dL 3 9   TOTAL BILIRUBIN mg/dL 0 40         Results from last 7 days   Lab Units 08/27/19  0512 08/26/19  1544   GLUCOSE RANDOM mg/dL 78 94       Results from last 7 days   Lab Units 08/26/19  1544   PROTIME seconds 12 2   INR  0 91   PTT seconds 24         Results from last 7 days   Lab Units 08/26/19  1544   PROCALCITONIN ng/ml 0 06     Results from last 7 days   Lab Units 08/26/19  1750 08/26/19  1544   LACTIC ACID mmol/L 1 6 2 3*     Results from last 7 days   Lab Units 08/26/19  1544   LIPASE u/L 117     Results from last 7 days   Lab Units 08/26/19  1650   CLARITY UA  Clear   COLOR UA  Yellow   SPEC GRAV UA  1 020   PH UA  6 0   GLUCOSE UA mg/dl Negative   KETONES UA mg/dl Negative   BLOOD UA  Trace-Intact*   PROTEIN UA mg/dl Negative   NITRITE UA  Negative   BILIRUBIN UA  Negative   UROBILINOGEN UA E U /dl 0 2   LEUKOCYTES UA  Negative   WBC UA /hpf 0-1*   RBC UA /hpf 1-2*   BACTERIA UA /hpf None Seen   EPITHELIAL CELLS WET PREP /hpf Occasional       Results from last 7 days   Lab Units 08/26/19  1608   GRAM STAIN RESULT  Gram negative rods*       ED Treatment:   Medication Administration from 08/26/2019 1509 to 08/26/2019 2026       Date/Time Order Dose Route Action Comments     08/26/2019 1553 fentanyl citrate (PF) 100 MCG/2ML 50 mcg 50 mcg Intravenous Given      08/26/2019 1553 ondansetron (ZOFRAN) injection 4 mg 4 mg Intravenous Given      08/26/2019 1657 sodium chloride 0 9 % bolus 1,000 mL 0 mL Intravenous Stopped      08/26/2019 1601 sodium chloride 0 9 % bolus 1,000 mL 1,000 mL Intravenous New Bag      08/26/2019 1621 diphenhydrAMINE (BENADRYL) injection 25 mg 25 mg Intravenous Given      08/26/2019 1621 famotidine (PEPCID) injection 20 mg 20 mg Intravenous Given      08/26/2019 1618 acetaminophen (TYLENOL) tablet 650 mg 650 mg Oral Given For fever 102 9     08/26/2019 1833 metroNIDAZOLE (FLAGYL) IVPB (premix) 500 mg 500 mg Intravenous New Bag      08/26/2019 1654 levofloxacin (LEVAQUIN) IVPB (premix) 750 mg 750 mg Intravenous New Bag      08/26/2019 1955 fentanyl citrate (PF) 100 MCG/2ML 25 mcg 25 mcg Intravenous Given      08/26/2019 1706 fentanyl citrate (PF) 100 MCG/2ML 25 mcg 0 mcg Intravenous Hold Verbal order per Dr Yeny Self due to pts blood pressure     08/26/2019 1708 sodium chloride 0 9 % bolus 1,000 mL 1,000 mL Intravenous New Bag      08/26/2019 1853 sodium chloride 0 9 % infusion 125 mL/hr Intravenous New Bag         Past Medical History:   Diagnosis Date    Anxiety     Asthma     Chronic kidney disease     Deep vein thrombosis ([facility] )     Disease of thyroid gland     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    Generalized anxiety disorder 8/26/2017    Heart murmur     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[address] )     Migraine     Myocardial infarction ([address] )     NSTEMI  pt denies, documented in cardio note    Pancreatitis     sphinger of     Psychiatric disorder     RA (rheumatoid arthritis) ([address] )     Ulcerative colitis ([address] )      Present on Admission:   Proctitis   Abdominal pain   Anxiety   Chronic back pain   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HCC)   Ulcerative colitis ([address] )   Leukocytosis   Severe sepsis (HCC)      Admitting Diagnosis: Proctitis [K62 89]  Rectal bleeding [K62 5]  GI bleed [K92 2]  Fever [R50 9]  Severe sepsis ([facility] ) [A41 9, R65 20]  Age/Sex: 53 y o  female  Admission Orders:    Current Facility-Administered Medications:  acetaminophen 650 mg Oral Q6H PRN    butalbital-acetaminophen-caffeine 1 tablet Oral Q8H PRN    cetirizine 10 mg Oral Daily    diphenhydrAMINE 50 mg Intravenous Q4H PRN    enoxaparin 40 mg Subcutaneous Q24H    HYDROmorphone 0 5 mg Intravenous Q4H PRN    levofloxacin 750 mg Intravenous Q24H    levothyroxine 50 mcg Oral Early Morning    LORazepam 1 mg Oral Q8H PRN    metroNIDAZOLE 500 mg Intravenous Q8H Last Rate: Stopped (08/27/19 1002)   montelukast 10 mg Oral Daily    morphine injection 1 mg Intravenous Q4H PRN    pantoprazole 40 mg Oral BID AC    ranitidine 150 mg Oral Q12H [address]    saccharomyces boulardii 250 mg Oral BID    sodium chloride 0 9 % with KCl 40 mEq/L 125 mL/hr Intravenous Continuous Last Rate: 125 mL/hr (08/27/19 1117)   sodium phosphate 12 mmol Intravenous Once    temazepam 30 mg Oral HS    Daily wt  scd  cardiopulm monitoring- telm  Clearsliquids  Blood cultures x2  Stool for c-diff and enteric panel , leukocytes, calprotectin, rotavirus o&p  IP CONSULT TO GASTROENTEROLOGY  IP CONSULT TO INFECTIOUS DISEASES    Network Utilization Review Department  Phone: [phone]; Fax [phone]  [email or web address]  ATTENTION: Please call with any questions or concerns to [phone]  and carefully listen to the prompts so that you are directed to the right person  Send all requests for admission clinical reviews, approved or denied determinations and any other requests to fax [phone]   All voicemails are confidential

## 2019-08-27 NOTE — PROGRESS NOTES
Progress Note - Javier Russo 1975, 53 y o  female MRN: [id number]    Unit/Bed#:  Encounter: [id number]    Primary Care Provider: Mahogany Simms PA-C   Date and time admitted to hospital: 8/26/2019  3:12 PM        * Severe sepsis [facility])  Assessment & Plan  · Severe sepsis was present on admission and secondary to gram-negative bacteremia  · Change antibiotics to IV cefepime  · Consult Infectious Disease  · Await the blood culture speciation and sensitivity results  · The lactic acidosis has resolved  · Follow the procalcitonin level    Bacteremia due to Gram-negative bacteria  Assessment & Plan  · Change antibiotics to IV cefepime  · Consult Infectious Disease  · Await the blood culture speciation and sensitivity results    Ulcerative colitis (HCC)  Assessment & Plan  · On chronic Remicade treatment  · Currently experiencing bloody diarrhea  · Check stool cultures including a Clostridium difficile culture, a Rotavirus stool antigen test, a stool culture for enteric bacterial pathogens, and stool for fecal leukocytes    · Consult Gastroenterology  · The patient is at high risk for DVT/venous thromboembolism in the setting of chronic ulcerative colitis, so she will be placed on lovenox 40 mg SQ every 24 hours DVT prophylaxis even in the setting of rectal bleeding    Immunosuppressed status ([facility] )  Assessment & Plan  · The patient is currently receiving IVIG treatment  · Also on chronic Remicade treatment    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[facility] )  Assessment & Plan  · The patient is followed at [facility]  · Continue her medication regimen per [facility]    Hypophosphatemia  Assessment & Plan  · Replete with sodium phosphate 12 mmol IV x 1 dose  · Follow the phosphorus level    Hypokalemia  Assessment & Plan  · Replete with IV potassium chloride supplementation  · Follow the potassium level and magnesium level    Vitamin D deficiency  Assessment & Plan  Outpatient follow-up with PCP in regards to this matter      Lactic acidosis  Assessment & Plan  · Secondary to severe sepsis  · Resolved      VTE Pharmacologic Prophylaxis:   Pharmacologic: Enoxaparin (Lovenox)  Mechanical VTE Prophylaxis in Place: Yes    Patient Centered Rounds: I have performed bedside rounds with nursing staff today  Time Spent for Care: 45 minutes  More than 50% of total time spent on counseling and coordination of care as described above  Current Length of Stay: 1 day(s)    Current Patient Status: Inpatient   Certification Statement: The patient will continue to require additional inpatient hospital stay due to the need for IV antibiotics and for a work-up of the bacteremia  Code Status: Level 1 - Full Code      Subjective: The patient was seen and examined  The patient complains of severe pruritus, bloody diarrhea, and generalized abdominal pain  Objective:     Vitals:   Temp (24hrs), Av 5 °F (37 5 °C), Min:97 1 °F (36 2 °C), Max:102 6 °F (39 2 °C)    Temp:  [97 1 °F (36 2 °C)-102 6 °F (39 2 °C)] 97 7 °F (36 5 °C)  HR:  [] 76  Resp:  [14-26] 18  BP: ()/(50-75) 100/63  SpO2:  [90 %-99 %] 98 %  Body mass index is 23 19 kg/m²  Input and Output Summary (last 24 hours):        Intake/Output Summary (Last 24 hours) at 2019 1422  Last data filed at 2019 1345  Gross per 24 hour   Intake 4848 33 ml   Output 2900 ml   Net 1948 33 ml       Physical Exam:     Physical Exam  General:  NAD, awake, alert, follows commands  HEENT:  NC/AT, mucous membranes dry  Neck:  Supple, No JVP elevation  CV:  + S1, + S2, RRR  Pulm:  Lung fields are CTA bilaterally  Abd:  Soft, Generalized abdominal pain with palpation, Non-distended  Ext:  No clubbing/cyanosis/edema  Skin:  No rashes    Additional Data:    Labs:    Results from last 7 days   Lab Units 19  0512  19  1544   WBC Thousand/uL 16 72*  --  17 33*   HEMOGLOBIN g/dL 12 6   < > 14 3   HEMATOCRIT % 37 5   < > 41 2   PLATELETS Thousands/uL 335   < > 352   NEUTROS PCT %  --   --  89*   LYMPHS PCT %  --   --  7*   MONOS PCT %  --   --  2*   EOS PCT %  --   --  1    < > = values in this interval not displayed  Results from last 7 days   Lab Units 08/27/19  0512 08/26/19  1544   SODIUM mmol/L 142 137   POTASSIUM mmol/L 3 3* 3 7   CHLORIDE mmol/L 107 102   CO2 mmol/L 26 22   BUN mg/dL 7 18   CREATININE mg/dL 0 62 1 06   ANION GAP mmol/L 9 13   CALCIUM mg/dL 7 8* 9 3   ALBUMIN g/dL  --  3 9   TOTAL BILIRUBIN mg/dL  --  0 40   ALK PHOS U/L  --  56   ALT U/L  --  25   AST U/L  --  33   GLUCOSE RANDOM mg/dL 78 94     Results from last 7 days   Lab Units 08/26/19  1544   INR  0 91             Results from last 7 days   Lab Units 08/27/19  0512 08/27/19  0037 08/26/19  1750 08/26/19  1544   LACTIC ACID mmol/L  --   --  1 6 2 3*   PROCALCITONIN ng/ml 4 46* 4 29*  --  0 06           * I Have Reviewed All Lab Data Listed Above  * Additional Pertinent Lab Tests Reviewed:  All [facility] Admission Reviewed      Recent Cultures (last 7 days):     Results from last 7 days   Lab Units 08/26/19  1608   GRAM STAIN RESULT  Gram negative rods*       Last 24 Hours Medication List:     Current Facility-Administered Medications:  acetaminophen 650 mg Oral Q6H PRN [facility], DO    butalbital-acetaminophen-caffeine 1 tablet Oral Q8H PRN [facility], DO    cefepime 2,000 mg Intravenous Q12H [facility], DO Last Rate: 2,000 mg (08/27/19 1345)   cetirizine 20 mg Oral BID [facility], DO    diphenhydrAMINE 50 mg Intravenous Q4H PRN [facility], DO    diphenhydrAMINE 50 mg Intravenous Once [facility], DO    enoxaparin 40 mg Subcutaneous Q24H [facility], DO    HYDROmorphone 0 5 mg Intravenous Q4H PRN [facility], DO    levothyroxine 50 mcg Oral Early Morning Felipe Tripp, DO    LORazepam 1 mg Oral Q8H PRN Felipe Tripp, DO    montelukast 10 mg Oral BID [facility], DO    morphine injection 1 mg Intravenous Q4H PRN Olive Cason DO    pantoprazole 40 mg Oral BID AC Olive Cason DO    ranitidine 300 mg Oral Q12H [address] Felipe Tripp DO    saccharomyces boulardii 250 mg Oral BID Olive Cason DO    sodium chloride 0 9 % with KCl 40 mEq/L 125 mL/hr Intravenous Continuous Olive Cason DO Last Rate: 125 mL/hr (08/27/19 1117)   temazepam 30 mg Oral HS Olive Cason DO         Today, Patient Was Seen By: Olive Cason DO    ** Please Note: Dictation voice to text software may have been used in the creation of this document   **

## 2019-08-27 NOTE — ASSESSMENT & PLAN NOTE
· On chronic Remicade treatment  · Currently experiencing bloody diarrhea  · Check stool cultures including a Clostridium difficile culture, a Rotavirus stool antigen test, a stool culture for enteric bacterial pathogens, and stool for fecal leukocytes    · Consult Gastroenterology  · The patient is at high risk for DVT/venous thromboembolism in the setting of chronic ulcerative colitis, so she will be placed on lovenox 40 mg SQ every 24 hours DVT prophylaxis even in the setting of rectal bleeding

## 2019-08-27 NOTE — PLAN OF CARE
Problem: Potential for Falls  Goal: Patient will remain free of falls  Description  INTERVENTIONS:  - Assess patient frequently for physical needs  -  Identify cognitive and physical deficits and behaviors that affect risk of falls  -  [place] fall precautions as indicated by assessment   - Educate patient/family on patient safety including physical limitations  - Instruct patient to call for assistance with activity based on assessment  - Modify environment to reduce risk of injury  - Consider OT/PT consult to assist with strengthening/mobility  Outcome: Progressing     Problem: Nutrition/Hydration-ADULT  Goal: Nutrient/Hydration intake appropriate for improving, restoring or maintaining nutritional needs  Description  Monitor and assess patient's nutrition/hydration status for malnutrition  Collaborate with interdisciplinary team and initiate plan and interventions as ordered  Monitor patient's weight and dietary intake as ordered or per policy  Utilize nutrition screening tool and intervene as necessary  Determine patient's food preferences and provide high-protein, high-caloric foods as appropriate       INTERVENTIONS:  - Monitor oral intake, urinary output, labs, and treatment plans  - Assess nutrition and hydration status and recommend course of action  - Evaluate amount of meals eaten  - Assist patient with eating if necessary   - Allow adequate time for meals  - Recommend/ encourage appropriate diets, oral nutritional supplements, and vitamin/mineral supplements  - Order, calculate, and assess calorie counts as needed  - Recommend, monitor, and adjust tube feedings and TPN/PPN based on assessed needs  - Assess need for intravenous fluids  - Provide specific nutrition/hydration education as appropriate  - Include patient/family/caregiver in decisions related to nutrition  Outcome: Progressing     Problem: PAIN - ADULT  Goal: Verbalizes/displays adequate comfort level or baseline comfort level  Description  Interventions:  - Encourage patient to monitor pain and request assistance  - Assess pain using appropriate pain scale  - Administer analgesics based on type and severity of pain and evaluate response  - Implement non-pharmacological measures as appropriate and evaluate response  - Consider cultural and social influences on pain and pain management  - Notify physician/advanced practitioner if interventions unsuccessful or patient reports new pain  Outcome: Progressing     Problem: INFECTION - ADULT  Goal: Absence or prevention of progression during hospitalization  Description  INTERVENTIONS:  - Assess and monitor for signs and symptoms of infection  - Monitor lab/diagnostic results  - Monitor all insertion sites, i e  indwelling lines, tubes, and drains  - Monitor endotracheal if appropriate and nasal secretions for changes in amount and color  - [place] appropriate cooling/warming therapies per order  - Administer medications as ordered  - Instruct and encourage patient and family to use good hand hygiene technique  - Identify and instruct in appropriate isolation precautions for identified infection/condition  Outcome: Progressing     Problem: SAFETY ADULT  Goal: Maintain or return to baseline ADL function  Description  INTERVENTIONS:  -  Assess patient's ability to carry out ADLs; assess patient's baseline for ADL function and identify physical deficits which impact ability to perform ADLs (bathing, care of mouth/teeth, toileting, grooming, dressing, etc )  - Assess/evaluate cause of self-care deficits   - Assess range of motion  - Assess patient's mobility; develop plan if impaired  - Assess patient's need for assistive devices and provide as appropriate  - Encourage maximum independence but intervene and supervise when necessary  - Involve family in performance of ADLs  - Assess for home care needs following discharge   - Consider OT consult to assist with ADL evaluation and planning for discharge  - Provide patient education as appropriate  Outcome: Progressing  Goal: Maintain or return mobility status to optimal level  Description  INTERVENTIONS:  - Assess patient's baseline mobility status (ambulation, transfers, stairs, etc )    - Identify cognitive and physical deficits and behaviors that affect mobility  - Identify mobility aids required to assist with transfers and/or ambulation (gait belt, sit-to-stand, lift, walker, cane, etc )  - [place] fall precautions as indicated by assessment  - Record patient progress and toleration of activity level on Mobility SBAR; progress patient to next Phase/Stage  - Instruct patient to call for assistance with activity based on assessment  - Consider rehabilitation consult to assist with strengthening/weightbearing, etc   Outcome: Progressing     Problem: Nutrition/Hydration-ADULT  Goal: Nutrient/Hydration intake appropriate for improving, restoring or maintaining nutritional needs  Description  Monitor and assess patient's nutrition/hydration status for malnutrition  Collaborate with interdisciplinary team and initiate plan and interventions as ordered  Monitor patient's weight and dietary intake as ordered or per policy  Utilize nutrition screening tool and intervene as necessary  Determine patient's food preferences and provide high-protein, high-caloric foods as appropriate       INTERVENTIONS:  - Monitor oral intake, urinary output, labs, and treatment plans  - Assess nutrition and hydration status and recommend course of action  - Evaluate amount of meals eaten  - Assist patient with eating if necessary   - Allow adequate time for meals  - Recommend/ encourage appropriate diets, oral nutritional supplements, and vitamin/mineral supplements  - Order, calculate, and assess calorie counts as needed  - Recommend, monitor, and adjust tube feedings and TPN/PPN based on assessed needs  - Assess need for intravenous fluids  - Provide specific nutrition/hydration education as appropriate  - Include patient/family/caregiver in decisions related to nutrition  Outcome: Progressing     Problem: PAIN - ADULT  Goal: Verbalizes/displays adequate comfort level or baseline comfort level  Description  Interventions:  - Encourage patient to monitor pain and request assistance  - Assess pain using appropriate pain scale  - Administer analgesics based on type and severity of pain and evaluate response  - Implement non-pharmacological measures as appropriate and evaluate response  - Consider cultural and social influences on pain and pain management  - Notify physician/advanced practitioner if interventions unsuccessful or patient reports new pain  Outcome: Progressing     Problem: INFECTION - ADULT  Goal: Absence or prevention of progression during hospitalization  Description  INTERVENTIONS:  - Assess and monitor for signs and symptoms of infection  - Monitor lab/diagnostic results  - Monitor all insertion sites, i e  indwelling lines, tubes, and drains  - Monitor endotracheal if appropriate and nasal secretions for changes in amount and color  - [place] appropriate cooling/warming therapies per order  - Administer medications as ordered  - Instruct and encourage patient and family to use good hand hygiene technique  - Identify and instruct in appropriate isolation precautions for identified infection/condition  Outcome: Progressing     Problem: SAFETY ADULT  Goal: Maintain or return to baseline ADL function  Description  INTERVENTIONS:  -  Assess patient's ability to carry out ADLs; assess patient's baseline for ADL function and identify physical deficits which impact ability to perform ADLs (bathing, care of mouth/teeth, toileting, grooming, dressing, etc )  - Assess/evaluate cause of self-care deficits   - Assess range of motion  - Assess patient's mobility; develop plan if impaired  - Assess patient's need for assistive devices and provide as appropriate  - Encourage maximum independence but intervene and supervise when necessary  - Involve family in performance of ADLs  - Assess for home care needs following discharge   - Consider OT consult to assist with ADL evaluation and planning for discharge  - Provide patient education as appropriate  Outcome: Progressing  Goal: Maintain or return mobility status to optimal level  Description  INTERVENTIONS:  - Assess patient's baseline mobility status (ambulation, transfers, stairs, etc )    - Identify cognitive and physical deficits and behaviors that affect mobility  - Identify mobility aids required to assist with transfers and/or ambulation (gait belt, sit-to-stand, lift, walker, cane, etc )  - [place] fall precautions as indicated by assessment  - Record patient progress and toleration of activity level on Mobility SBAR; progress patient to next Phase/Stage  - Instruct patient to call for assistance with activity based on assessment  - Consider rehabilitation consult to assist with strengthening/weightbearing, etc   Outcome: Progressing     Problem: DISCHARGE PLANNING  Goal: Discharge to home or other facility with appropriate resources  Description  INTERVENTIONS:  - Identify barriers to discharge w/patient and caregiver  - Arrange for needed discharge resources and transportation as appropriate  - Identify discharge learning needs (meds, wound care, etc )  - Arrange for interpretive services to assist at discharge as needed  - Refer to Case Management Department for coordinating discharge planning if the patient needs post-hospital services based on physician/advanced practitioner order or complex needs related to functional status, cognitive ability, or social support system  Outcome: Progressing     Problem: Knowledge Deficit  Goal: Patient/family/caregiver demonstrates understanding of disease process, treatment plan, medications, and discharge instructions  Description  Complete learning assessment and assess knowledge base    Interventions:  - Provide teaching at level of understanding  - Provide teaching via preferred learning methods  Outcome: Progressing     Problem: CARDIOVASCULAR - ADULT  Goal: Maintains optimal cardiac output and hemodynamic stability  Description  INTERVENTIONS:  - Monitor I/O, vital signs and rhythm  - Monitor for S/S and trends of decreased cardiac output  - Administer and titrate ordered vasoactive medications to optimize hemodynamic stability  - Assess quality of pulses, skin color and temperature  - Assess for signs of decreased coronary artery perfusion  - Instruct patient to report change in severity of symptoms  Outcome: Progressing  Goal: Absence of cardiac dysrhythmias or at baseline rhythm  Description  INTERVENTIONS:  - Continuous cardiac monitoring, vital signs, obtain 12 lead EKG if ordered  - Administer antiarrhythmic and heart rate control medications as ordered  - Monitor electrolytes and administer replacement therapy as ordered  Outcome: Progressing     Problem: GASTROINTESTINAL - ADULT  Goal: Minimal or absence of nausea and/or vomiting  Description  INTERVENTIONS:  - Administer IV fluids if ordered to ensure adequate hydration  - Maintain NPO status until nausea and vomiting are resolved  - Nasogastric tube if ordered  - Administer ordered antiemetic medications as needed  - Provide nonpharmacologic comfort measures as appropriate  - Advance diet as tolerated, if ordered  - Consider nutrition services referral to assist patient with adequate nutrition and appropriate food choices  Outcome: Progressing  Goal: Maintains or returns to baseline bowel function  Description  INTERVENTIONS:  - Assess bowel function  - Encourage oral fluids to ensure adequate hydration  - Administer IV fluids if ordered to ensure adequate hydration  - Administer ordered medications as needed  - Encourage mobilization and activity  - Consider nutritional services referral to assist patient with adequate nutrition and appropriate food choices  Outcome: Progressing  Goal: Maintains adequate nutritional intake  Description  INTERVENTIONS:  - Monitor percentage of each meal consumed  - Identify factors contributing to decreased intake, treat as appropriate  - Assist with meals as needed  - Monitor I&O, weight, and lab values if indicated  - Obtain nutrition services referral as needed  Outcome: Progressing     Problem: METABOLIC, FLUID AND ELECTROLYTES - ADULT  Goal: Electrolytes maintained within normal limits  Description  INTERVENTIONS:  - Monitor labs and assess patient for signs and symptoms of electrolyte imbalances  - Administer electrolyte replacement as ordered  - Monitor response to electrolyte replacements, including repeat lab results as appropriate  - Instruct patient on fluid and nutrition as appropriate  Outcome: Progressing  Goal: Fluid balance maintained  Description  INTERVENTIONS:  - Monitor labs   - Monitor I/O and WT  - Instruct patient on fluid and nutrition as appropriate  - Assess for signs & symptoms of volume excess or deficit  Outcome: Progressing     Problem: SKIN/TISSUE INTEGRITY - ADULT  Goal: Skin integrity remains intact  Description  INTERVENTIONS  - Identify patients at risk for skin breakdown  - Assess and monitor skin integrity  - Assess and monitor nutrition and hydration status  - Monitor labs (i e  albumin)  - Assess for incontinence   - Turn and reposition patient  - Assist with mobility/ambulation  - Relieve pressure over bony prominences  - Avoid friction and shearing  - Provide appropriate hygiene as needed including keeping skin clean and dry  - Evaluate need for skin moisturizer/barrier cream  - Collaborate with interdisciplinary team (i e  Nutrition, Rehabilitation, etc )   - Patient/family teaching  Outcome: Progressing  Goal: Incision(s), wounds(s) or drain site(s) healing without S/S of infection  Description  INTERVENTIONS  - Assess and document risk factors for skin impairment   - Assess and document dressing, incision, wound bed, drain sites and surrounding tissue  - Consider nutrition services referral as needed  - Oral mucous membranes remain intact  - Provide patient/ family education  Outcome: Progressing  Goal: Oral mucous membranes remain intact  Description  INTERVENTIONS  - Assess oral mucosa and hygiene practices  - Implement preventative oral hygiene regimen  - Implement oral medicated treatments as ordered  - Initiate Nutrition services referral as needed  Outcome: Progressing     Problem: HEMATOLOGIC - ADULT  Goal: Maintains hematologic stability  Description  INTERVENTIONS  - Assess for signs and symptoms of bleeding or hemorrhage  - Monitor labs  - Administer supportive blood products/factors as ordered and appropriate  Outcome: Progressing

## 2019-08-27 NOTE — ASSESSMENT & PLAN NOTE
Chief complaint left lower quadrant abdominal pain-accompanied with bright red blood per rectum and 1 bloody BM  Ct Abdomen Pelvis Wo Contrast- 8/26/2019-"Impression: Circumferential thickening of the rectum in keeping with a nonspecific proctitis  No bowel pneumatosis   No bowel obstruction "  Levaquin and Flagyl initiated emergency room-will continue  Patient admitted to step-down 1  Continuous cardiopulmonary monitoring  Hemoccult all stools  Repeat H&H at midnight  trend CBC daily  Hemoglobin currently stable  No further bloody bowel movements since ER admission  Provide supportive care  Provide analgesia as needed  NPO  Normal saline 125 mL/hour

## 2019-08-27 NOTE — H&P
H&P- Eliz Salas 1975, 53 y o  female MRN: [id number]    Unit/Bed#:  Encounter: [id number]    Primary Care Provider: Jazmin Begum PA-C   Date and time admitted to hospital: 8/26/2019  3:12 PM        Proctitis  Assessment & Plan  Chief complaint left lower quadrant abdominal pain-accompanied with bright red blood per rectum and 1 bloody BM  Ct Abdomen Pelvis Wo Contrast- 8/26/2019-"Impression: Circumferential thickening of the rectum in keeping with a nonspecific proctitis  No bowel pneumatosis  No bowel obstruction "  Levaquin and Flagyl initiated emergency room-will continue  Patient admitted to step-down 1  Continuous cardiopulmonary monitoring  Hemoccult all stools  Repeat H&H at midnight  trend CBC daily  Hemoglobin currently stable  No further bloody bowel movements since ER admission  Provide supportive care  Provide analgesia as needed  NPO  Normal saline 125 mL/hour    Abdominal pain  Assessment & Plan  Chief complaint left lower quadrant abdominal pain-accompanied with bright red blood per rectum and 1 bloody BM  Ct Abdomen Pelvis Wo Contrast- 8/26/2019-"Impression: Circumferential thickening of the rectum in keeping with a nonspecific proctitis  No bowel pneumatosis   No bowel obstruction "  Levaquin and Flagyl initiated emergency room-will continue  Patient admitted to step-down 1  Continuous cardiopulmonary monitoring  Hemoccult all stools  Repeat H&H at midnight  trend CBC daily  Hemoglobin currently stable  No further bloody bowel movements since ER admission  Provide supportive care  Provide analgesia as needed  NPO  Normal saline 125 mL/hour    Ulcerative colitis ([address] )  Assessment & Plan  Patient receives Remicade every 6 weeks last dose approximately 1 5 weeks ago  Patient is established at [facility]    * Severe sepsis [facility])  Assessment & Plan  White blood count elevated at 17 33, elevated lactic 2 3, febrile 102 6, tachypneic and tachycardia  Admit to ICU step-down 1  Continuous cardiopulmonary monitoring  Blood cultures pending  Continue IV antibiotics-Levaquin and Flagyl  Trend lactic level until normalization  Tylenol p r n  For fever  Normal saline 125 mL/hour      Chronic back pain  Assessment & Plan  Follows [facility]  Provide supportive care    Mast cell activation syndrome [facility])  Assessment & Plan  Patient is established at [facility]  Continue outpatient follow-up as recommended    Leukocytosis  Assessment & Plan  White blood count 17 33 on admission to emergency  Serial procalcitonin  Trend CBC  Blood cultures pending    Anxiety  Assessment & Plan  Provide supportive care  Continue prior to admission medication        VTE Prophylaxis: Contraindicated-rectal bleeding  / sequential compression device   Code Status:  Full  POLST: POLST is not applicable to this patient    Anticipated Length of Stay:  Patient will be admitted on an Inpatient basis with an anticipated length of stay of  greater than 2 midnights  Justification for Hospital Stay:  Severe sepsis, proctitis leukocytosis    Total Time for Visit, including Counseling / Coordination of Care: 1 hour  Greater than 50% of this total time spent on direct patient counseling and coordination of care  Chief Complaint:   Left lower quadrant abdominal pain fever and chills    History of Present Illness:    Ivelisse Piedra is a 53 y o  female who presented to emergency room for evaluation of left lower quadrant pain which started few days ago accompanied by fever and chills  Patient has a past medical history including ulcerative colitis receiving Remicade every 6 weeks, mast cell activation, rheumatoid arthritis, pancreatitis, generalized anxiety disorder, disease of the thyroid gland, DVT, and chronic kidney disease and asthma  Patient reports not feeling well for the past few weeks which increased sleep persistent since Friday   Patient denies lightheadedness dizziness, patient admits to fever and chills, denies chest pain palpitations denies shortness of breath denies cough, admits to left lower quadrant abdominal pain, denies nausea admits to diarrhea admits to constipation denies vomiting, denies dysuria urgency or frequency admits to bright red blood per rectum x1 BM additionally admits to bloody leakage from the rectum times to without bowel movement  Images and labs were collected emergency room-see below  Patient was admitted by emergency room doctor  Significant findings included severe sepsis, proctitis  Review of Systems:    Review of Systems   Constitutional: Positive for chills and fever  Gastrointestinal: Positive for blood in stool, constipation and diarrhea  All other systems reviewed and are negative  Past Medical and Surgical History:     Past Medical History:   Diagnosis Date    Anxiety     Asthma     Chronic kidney disease     Deep vein thrombosis (HCC)     Disease of thyroid gland     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    Generalized anxiety disorder 8/26/2017    Heart murmur     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HCC)     Migraine     Myocardial infarction [facility] [address] [address] )     NSTEMI  pt denies, documented in cardio note    Pancreatitis     sphinger of     Psychiatric disorder     RA (rheumatoid arthritis) [address] )     Ulcerative colitis ([facility] [address] )        Past Surgical History:   Procedure Laterality Date    APPENDECTOMY      CHOLECYSTECTOMY      EGD AND COLONOSCOPY N/A 9/12/2017    Procedure: EGD AND COLONOSCOPY;  Surgeon: Loraine Vela MD;  [facility]: [facility]; Service: Gastroenterology    ERCP N/A 9/15/2017    Procedure: ENDOSCOPIC RETROGRADE CHOLANGIOPANCREATOGRAPHY (ERCP); Surgeon: Jennifer Yusuf MD;  [facility];   Service: Gastroenterology    HYSTERECTOMY      KNEE SURGERY Right     LAPAROSCOPIC ENDOMETRIOSIS FULGURATION      ORIF ANKLE FRACTURE Left     MO KNEE SCOPE,MED/LAT MENISECTOMY Left 5/12/2017    Procedure: POSSIBLE MEDIAL MENISECTOMY;  Surgeon: Roseann Naranjo MD;  Location: [facility];  Service: Orthopedics    DC KNEE [address] CART Left 5/12/2017    Procedure: KNEE ARTHROSCOPY EVALUATION, CHONDROPLASTY;  Surgeon: Roseann Naranjo MD;  Location: [facility];  Service: Orthopedics    DC LAP,DIAGNOSTIC ABDOMEN N/A 12/12/2017    Procedure: LAPAROSCOPY DIAGNOSTIC  WITH LYSIS OF ADHESIONS;  Surgeon: Desean Atkinson DO;  Location:  MAIN OR;  Service: General       Meds/Allergies:    Prior to Admission medications    Medication Sig Start Date End Date Taking?  Authorizing Provider   butalbital-acetaminophen-caffeine (FIORICET,ESGIC) -40 mg per tablet Take 1 tablet by mouth every 8 (eight) hours as needed for headaches 2/14/19   Kieran Mcelroy MD   cetirizine (ZyrTEC) 10 mg tablet Take 20 mg by mouth 2 (two) times a day as needed ("relief")     Historical Provider, MD   cholecalciferol (VITAMIN D) 400 units/mL Take 2 5 mL (1,000 Units total) by mouth daily 10/5/18   Pilar Zuluaga PA-C   diphenhydrAMINE (BENADRYL) 25 mg tablet Take 50 mg by mouth as needed for itching     Historical Provider, MD   EPINEPHrine (EPIPEN 2-MARYSOL IJ) EpiPen   prn    Historical Provider, MD   Fexofenadine HCl (MUCINEX ALLERGY PO) Take by mouth    Historical Provider, MD   furosemide (LASIX) 20 mg tablet Take 20 mg by mouth daily as needed  3/22/19   Historical Provider, MD   hydrOXYzine HCL (ATARAX) 25 mg tablet  6/10/19   Historical Provider, MD   inFLIXimab (REMICADE IV) 700 mg Every 6 weeks    Historical Provider, MD   levothyroxine (LEVOXYL) 50 mcg tablet Levoxyl 50 mcg tablet    Historical Provider, MD   LORazepam (ATIVAN) 1 mg tablet Take 1 tablet (1 mg total) by mouth every 8 (eight) hours as needed for anxiety 5/28/19   Sheeba Triana PA-C   montelukast (SINGULAIR) 10 mg tablet Take 1 tablet (10 mg total) by mouth 2 (two) times a day 7/3/19   Anne Richards PA-C   nystatin (MYCOSTATIN) 500,000 units/5 mL suspension Apply 5 mL (500,000 Units total) to the mouth or throat 4 (four) times a day 6/21/19   Bernie Sanches PA-C   omalizumab Nate Plume) 150 mg Inject 300 mg under the skin every 28 days     Historical Provider, MD   pantoprazole (PROTONIX) 40 mg tablet Take 40 mg by mouth 2 (two) times a day     Historical Provider, MD   Probiotic Product (PROBIOTIC DAILY PO) Probiotic   floistar    Historical Provider, MD   ranitidine (ZANTAC) 150 MG capsule Take 300 mg by mouth 2 (two) times a day     Historical Provider, MD   temazepam (RESTORIL) 30 mg capsule Take 1 capsule (30 mg total) by mouth daily at bedtime 7/16/19   Anurag Calderon MD   traMADol Camille Nicholas) 50 mg tablet Take 1 tablet (50 mg total) by mouth every 6 (six) hours as needed for moderate pain or severe pain  Patient taking differently: Take 50 mg by mouth 2 (two) times a day  2/14/19   Felipe Henriquez DO     I have reviewed home medications with patient personally  Allergies:    Allergies   Allergen Reactions    Amitriptyline Anaphylaxis     According to the patient she had nphylaxis    Aspergillus Fumigatus Other (See Comments), Hives and Swelling    Azathioprine Other (See Comments) and Anaphylaxis     pancreatitis  According to patient she needed Epipen    Sumatriptan Anaphylaxis     Bradycardia, sob, back pressure, head pain,throat tightness  According to the patient she had anphylaxis    Contrast [Iodinated Diagnostic Agents]      Pt states needs to be premedicated prior to injection    Corn Dextrin      Any corn based products    Corn Oil Hives    Gelatin     Histamine      Intolerance      Ibuprofen Hives and Throat Swelling    Malto Dextrin [Dextrin]     Other      Gel caps, maltodextrose, dextrose    Penicillins Hives and Throat Swelling    Penicillium Notatum     Sulfa Antibiotics Hives and Other (See Comments)    Sulfate     Sulfites        Social History:     Marital Status: /Civil Union   Occupation:  Noncontributory  Patient Pre-hospital Living Situation:  Independent  Patient Pre-hospital Level of Mobility:  Independent  Patient Pre-hospital Diet Restrictions:  Denies  Substance Use History:   Social History     Substance and Sexual Activity   Alcohol Use Never    Frequency: Never    Binge frequency: Never     Social History     Tobacco Use   Smoking Status Never Smoker   Smokeless Tobacco Never Used     Social History     Substance and Sexual Activity   Drug Use Not Currently       Family History:    Family History   Problem Relation Age of Onset    Ulcerative colitis Mother     Heart failure Father     Neuropathy Father     Macular degeneration Father     Diabetes Father     Asthma Daughter     Hypothyroidism Daughter     Heart disease Maternal Grandmother     Arthritis Maternal Grandmother     Arthritis Paternal Grandmother     Macular degeneration Paternal Grandmother     Lymphoma Paternal Grandfather     Heart failure Paternal Aunt     Heart failure Paternal Aunt     Breast cancer Paternal Aunt 53    Breast cancer additional onset Paternal Aunt 62    Hypothyroidism Paternal Aunt     Breast cancer Maternal Aunt     No Known Problems Maternal Grandfather        Physical Exam:     Vitals:   Blood Pressure: 102/57 (08/26/19 2030)  Pulse: 94 (08/26/19 2030)  Temperature: 100 5 °F (38 1 °C) (08/26/19 2030)  Temp Source: Tympanic (08/26/19 2030)  Respirations: 20 (08/26/19 2030)  Height: 5' 5" (165 1 cm) (08/26/19 2030)  Weight - Scale: 63 5 kg (139 lb 15 9 oz) (08/26/19 2030)  SpO2: 98 % (08/26/19 2030)    Physical Exam   Constitutional: She is oriented to person, place, and time  She appears well-developed and well-nourished  She appears ill  No distress  HENT:   Head: Normocephalic and atraumatic  Eyes: Pupils are equal, round, and reactive to light  EOM are normal  Right eye exhibits no discharge  Left eye exhibits no discharge  Neck: Normal range of motion  Neck supple  No JVD present   No tracheal deviation present  No thyromegaly present  Cardiovascular: Normal rate, regular rhythm, normal heart sounds and intact distal pulses  Exam reveals no gallop and no friction rub  No murmur heard  Pulmonary/Chest: Effort normal and breath sounds normal  No stridor  No respiratory distress  She has no wheezes  Abdominal: Soft  Bowel sounds are normal  She exhibits no distension  There is tenderness (To touch)  There is no guarding  Musculoskeletal: She exhibits no edema, tenderness or deformity  Neurological: She is alert and oriented to person, place, and time  No cranial nerve deficit  GCS eye subscore is 4  GCS verbal subscore is 5  GCS motor subscore is 6  Skin: Skin is warm and dry  Capillary refill takes less than 2 seconds  No rash noted  She is not diaphoretic  No erythema  Psychiatric: She has a normal mood and affect  Her behavior is normal  Judgment and thought content normal        Additional Data:     Lab Results: I have personally reviewed pertinent reports  Results from last 7 days   Lab Units 08/26/19  1544   WBC Thousand/uL 17 33*   HEMOGLOBIN g/dL 14 3   HEMATOCRIT % 41 2   PLATELETS Thousands/uL 352   NEUTROS PCT % 89*   LYMPHS PCT % 7*   MONOS PCT % 2*   EOS PCT % 1     Results from last 7 days   Lab Units 08/26/19  1544   POTASSIUM mmol/L 3 7   CHLORIDE mmol/L 102   CO2 mmol/L 22   BUN mg/dL 18   CREATININE mg/dL 1 06   CALCIUM mg/dL 9 3   ALK PHOS U/L 56   ALT U/L 25   AST U/L 33     Results from last 7 days   Lab Units 08/26/19  1544   INR  0 91       Imaging: I have personally reviewed pertinent reports  Ct Abdomen Pelvis Wo Contrast    Result Date: 8/26/2019  Narrative: CT ABDOMEN AND PELVIS WITHOUT IV CONTRAST INDICATION:   Abdominal pain, GI bleed, elevated lactate  Leeann Yanez "Pt states she hasn't been feeling good x3 weeks with blood in stool  States "everything hurts," states hx of diverticulitis  Started this am with fever" COMPARISON:  None   TECHNIQUE:  CT examination of the abdomen and pelvis was performed without intravenous contrast   Axial, sagittal, and coronal 2D reformatted images were created from the source data and submitted for interpretation  Radiation dose length product (DLP) for this visit:  454 99 mGy-cm   This examination, like all CT scans performed in the [facility], was performed utilizing techniques to minimize radiation dose exposure, including the use of iterative  reconstruction and automated exposure control  Enteric contrast was administered  FINDINGS: ABDOMEN LOWER CHEST:  No clinically significant abnormality identified in the visualized lower chest  LIVER/BILIARY TREE:  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  GALLBLADDER:  Gallbladder is surgically absent  SPLEEN:  Unremarkable  PANCREAS:  Unremarkable  ADRENAL GLANDS:  Unremarkable  KIDNEYS/URETERS:  Nonobstructive 4 mm left renal lower pole calculus  Nonobstructive 1 mm upper pole right renal calculus  STOMACH AND BOWEL:  Circumferential thickening of the rectum in keeping with a nonspecific proctitis  No bowel pneumatosis  No bowel obstruction  APPENDIX:  No findings to suggest appendicitis  ABDOMINOPELVIC CAVITY:  No ascites or free intraperitoneal air  No lymphadenopathy  VESSELS:  Unremarkable for patient's age  PELVIS REPRODUCTIVE ORGANS:  Unremarkable for patient's age  URINARY BLADDER:  Unremarkable  ABDOMINAL WALL/INGUINAL REGIONS:  Unremarkable  OSSEOUS STRUCTURES:  No acute fracture or destructive osseous lesion  Impression: Circumferential thickening of the rectum in keeping with a nonspecific proctitis  No bowel pneumatosis  No bowel obstruction  The study was marked in [facility] for immediate notification  Workstation performed: [id number]     [employer] / [employer] Everywhere Records Reviewed: Yes     ** Please Note: This note has been constructed using a voice recognition system   **

## 2019-08-27 NOTE — SOCIAL WORK
Met with pt to discuss role as  in helping pt to develop discharge plan and to help pt carry out their plan  Pt lives in a house with her    Pt has no DME at home   Pt is independent with ADL's  Pt has never had home care or any services in the home  Pt's PCP is Ines Rosas   Pt uses the [employer]  Pt was given a discharge check list and Meds to [facility] Papers  Both reviewed with a good understanding  Will continue to follow for any case management needs

## 2019-08-27 NOTE — ASSESSMENT & PLAN NOTE
Patient receives Remicade every 6 weeks last dose approximately 1 5 weeks ago  Patient is established at [facility]

## 2019-08-27 NOTE — ASSESSMENT & PLAN NOTE
White blood count elevated at 17 33, elevated lactic 2 3, febrile 102 6, tachypneic and tachycardia  Admit to ICU step-down 1  Continuous cardiopulmonary monitoring  Blood cultures pending  Continue IV antibiotics-Levaquin and Flagyl  Trend lactic level until normalization  Tylenol p r n   For fever  Normal saline 125 mL/hour

## 2019-08-28 LAB
ALBUMIN SERPL BCP-MCNC: 3.3 G/DL (ref 3.5–5)
ALP SERPL-CCNC: 43 U/L (ref 46–116)
ALT SERPL W P-5'-P-CCNC: 23 U/L (ref 12–78)
ANION GAP SERPL CALCULATED.3IONS-SCNC: 6 MMOL/L (ref 4–13)
AST SERPL W P-5'-P-CCNC: 22 U/L (ref 5–45)
BACTERIA UR CULT: NORMAL
BASOPHILS # BLD AUTO: 0.1 THOUSANDS/ΜL (ref 0–0.1)
BASOPHILS NFR BLD AUTO: 2 % (ref 0–1)
BILIRUB SERPL-MCNC: 0.3 MG/DL (ref 0.2–1)
BUN SERPL-MCNC: 4 MG/DL (ref 5–25)
C DIFF TOX GENS STL QL NAA+PROBE: NORMAL
CA-I BLD-SCNC: 1.17 MMOL/L (ref 1.12–1.32)
CALCIUM SERPL-MCNC: 8.3 MG/DL (ref 8.3–10.1)
CHLORIDE SERPL-SCNC: 107 MMOL/L (ref 100–108)
CO2 SERPL-SCNC: 25 MMOL/L (ref 21–32)
CREAT SERPL-MCNC: 0.73 MG/DL (ref 0.6–1.3)
EOSINOPHIL # BLD AUTO: 0.49 THOUSAND/ΜL (ref 0–0.61)
EOSINOPHIL NFR BLD AUTO: 8 % (ref 0–6)
ERYTHROCYTE [DISTWIDTH] IN BLOOD BY AUTOMATED COUNT: 13 % (ref 11.6–15.1)
GFR SERPL CREATININE-BSD FRML MDRD: 99 ML/MIN/1.73SQ M
GLUCOSE SERPL-MCNC: 84 MG/DL (ref 65–140)
HCT VFR BLD AUTO: 40 % (ref 34.8–46.1)
HGB BLD-MCNC: 13.1 G/DL (ref 11.5–15.4)
IMM GRANULOCYTES # BLD AUTO: 0.01 THOUSAND/UL (ref 0–0.2)
IMM GRANULOCYTES NFR BLD AUTO: 0 % (ref 0–2)
LYMPHOCYTES # BLD AUTO: 2.06 THOUSANDS/ΜL (ref 0.6–4.47)
LYMPHOCYTES NFR BLD AUTO: 34 % (ref 14–44)
MAGNESIUM SERPL-MCNC: 2.1 MG/DL (ref 1.6–2.6)
MCH RBC QN AUTO: 32.2 PG (ref 26.8–34.3)
MCHC RBC AUTO-ENTMCNC: 32.8 G/DL (ref 31.4–37.4)
MCV RBC AUTO: 98 FL (ref 82–98)
MONOCYTES # BLD AUTO: 0.83 THOUSAND/ΜL (ref 0.17–1.22)
MONOCYTES NFR BLD AUTO: 14 % (ref 4–12)
NEUTROPHILS # BLD AUTO: 2.58 THOUSANDS/ΜL (ref 1.85–7.62)
NEUTS SEG NFR BLD AUTO: 42 % (ref 43–75)
NRBC BLD AUTO-RTO: 0 /100 WBCS
PHOSPHATE SERPL-MCNC: 3 MG/DL (ref 2.7–4.5)
PLATELET # BLD AUTO: 333 THOUSANDS/UL (ref 149–390)
PMV BLD AUTO: 9.2 FL (ref 8.9–12.7)
POTASSIUM SERPL-SCNC: 4.4 MMOL/L (ref 3.5–5.3)
PROCALCITONIN SERPL-MCNC: 2.11 NG/ML
PROT SERPL-MCNC: 6.7 G/DL (ref 6.4–8.2)
RBC # BLD AUTO: 4.07 MILLION/UL (ref 3.81–5.12)
RV AG STL QL: NEGATIVE
SODIUM SERPL-SCNC: 138 MMOL/L (ref 136–145)
WBC # BLD AUTO: 6.07 THOUSAND/UL (ref 4.31–10.16)

## 2019-08-28 PROCEDURE — 84100 ASSAY OF PHOSPHORUS: CPT | Performed by: INTERNAL MEDICINE

## 2019-08-28 PROCEDURE — G0427 INPT/ED TELECONSULT70: HCPCS | Performed by: INTERNAL MEDICINE

## 2019-08-28 PROCEDURE — 82330 ASSAY OF CALCIUM: CPT | Performed by: INTERNAL MEDICINE

## 2019-08-28 PROCEDURE — 99233 SBSQ HOSP IP/OBS HIGH 50: CPT | Performed by: INTERNAL MEDICINE

## 2019-08-28 PROCEDURE — 83735 ASSAY OF MAGNESIUM: CPT | Performed by: INTERNAL MEDICINE

## 2019-08-28 PROCEDURE — 87425 ROTAVIRUS AG IA: CPT | Performed by: INTERNAL MEDICINE

## 2019-08-28 PROCEDURE — 99232 SBSQ HOSP IP/OBS MODERATE 35: CPT | Performed by: INTERNAL MEDICINE

## 2019-08-28 PROCEDURE — 87209 SMEAR COMPLEX STAIN: CPT | Performed by: INTERNAL MEDICINE

## 2019-08-28 PROCEDURE — 80053 COMPREHEN METABOLIC PANEL: CPT | Performed by: INTERNAL MEDICINE

## 2019-08-28 PROCEDURE — 85025 COMPLETE CBC W/AUTO DIFF WBC: CPT | Performed by: INTERNAL MEDICINE

## 2019-08-28 PROCEDURE — 84145 PROCALCITONIN (PCT): CPT | Performed by: INTERNAL MEDICINE

## 2019-08-28 PROCEDURE — 89055 LEUKOCYTE ASSESSMENT FECAL: CPT | Performed by: INTERNAL MEDICINE

## 2019-08-28 PROCEDURE — 87505 NFCT AGENT DETECTION GI: CPT | Performed by: INTERNAL MEDICINE

## 2019-08-28 PROCEDURE — 87177 OVA AND PARASITES SMEARS: CPT | Performed by: INTERNAL MEDICINE

## 2019-08-28 PROCEDURE — 83993 ASSAY FOR CALPROTECTIN FECAL: CPT | Performed by: INTERNAL MEDICINE

## 2019-08-28 RX ORDER — SODIUM CHLORIDE 9 MG/ML
150 INJECTION, SOLUTION INTRAVENOUS CONTINUOUS
Status: DISCONTINUED | OUTPATIENT
Start: 2019-08-28 | End: 2019-09-08

## 2019-08-28 RX ORDER — ONDANSETRON 2 MG/ML
4 INJECTION INTRAMUSCULAR; INTRAVENOUS EVERY 6 HOURS PRN
Status: DISCONTINUED | OUTPATIENT
Start: 2019-08-28 | End: 2019-09-12 | Stop reason: HOSPADM

## 2019-08-28 RX ADMIN — LORAZEPAM 1 MG: 1 TABLET ORAL at 15:37

## 2019-08-28 RX ADMIN — CETIRIZINE HYDROCHLORIDE 20 MG: 10 TABLET ORAL at 12:22

## 2019-08-28 RX ADMIN — DIPHENHYDRAMINE HYDROCHLORIDE 50 MG: 50 INJECTION, SOLUTION INTRAMUSCULAR; INTRAVENOUS at 06:14

## 2019-08-28 RX ADMIN — TEMAZEPAM 30 MG: 15 CAPSULE ORAL at 22:46

## 2019-08-28 RX ADMIN — CEFEPIME HYDROCHLORIDE 2000 MG: 2 INJECTION, POWDER, FOR SOLUTION INTRAVENOUS at 23:16

## 2019-08-28 RX ADMIN — HYDROMORPHONE HYDROCHLORIDE 0.5 MG: 1 INJECTION, SOLUTION INTRAMUSCULAR; INTRAVENOUS; SUBCUTANEOUS at 10:27

## 2019-08-28 RX ADMIN — RANITIDINE 300 MG: 150 TABLET ORAL at 18:36

## 2019-08-28 RX ADMIN — HYDROMORPHONE HYDROCHLORIDE 0.5 MG: 1 INJECTION, SOLUTION INTRAMUSCULAR; INTRAVENOUS; SUBCUTANEOUS at 05:21

## 2019-08-28 RX ADMIN — HYDROMORPHONE HYDROCHLORIDE 0.5 MG: 1 INJECTION, SOLUTION INTRAMUSCULAR; INTRAVENOUS; SUBCUTANEOUS at 20:45

## 2019-08-28 RX ADMIN — DIPHENHYDRAMINE HYDROCHLORIDE 50 MG: 50 INJECTION, SOLUTION INTRAMUSCULAR; INTRAVENOUS at 16:38

## 2019-08-28 RX ADMIN — PANTOPRAZOLE SODIUM 40 MG: 40 TABLET, DELAYED RELEASE ORAL at 18:38

## 2019-08-28 RX ADMIN — CETIRIZINE HYDROCHLORIDE 20 MG: 10 TABLET ORAL at 18:37

## 2019-08-28 RX ADMIN — MONTELUKAST 10 MG: 10 TABLET, FILM COATED ORAL at 18:36

## 2019-08-28 RX ADMIN — CEFEPIME HYDROCHLORIDE 2000 MG: 2 INJECTION, POWDER, FOR SOLUTION INTRAVENOUS at 12:21

## 2019-08-28 RX ADMIN — POTASSIUM CHLORIDE AND SODIUM CHLORIDE 125 ML/HR: 900; 300 INJECTION, SOLUTION INTRAVENOUS at 03:35

## 2019-08-28 RX ADMIN — ONDANSETRON 4 MG: 2 INJECTION INTRAMUSCULAR; INTRAVENOUS at 15:37

## 2019-08-28 RX ADMIN — SODIUM CHLORIDE 100 ML/HR: 0.9 INJECTION, SOLUTION INTRAVENOUS at 22:51

## 2019-08-28 RX ADMIN — MONTELUKAST 10 MG: 10 TABLET, FILM COATED ORAL at 12:33

## 2019-08-28 RX ADMIN — Medication 250 MG: at 08:46

## 2019-08-28 RX ADMIN — HYDROMORPHONE HYDROCHLORIDE 0.5 MG: 1 INJECTION, SOLUTION INTRAMUSCULAR; INTRAVENOUS; SUBCUTANEOUS at 14:56

## 2019-08-28 RX ADMIN — PANTOPRAZOLE SODIUM 40 MG: 40 TABLET, DELAYED RELEASE ORAL at 12:22

## 2019-08-28 RX ADMIN — RANITIDINE 300 MG: 150 TABLET ORAL at 12:22

## 2019-08-28 RX ADMIN — HYDROMORPHONE HYDROCHLORIDE 0.5 MG: 1 INJECTION, SOLUTION INTRAMUSCULAR; INTRAVENOUS; SUBCUTANEOUS at 00:21

## 2019-08-28 RX ADMIN — DIPHENHYDRAMINE HYDROCHLORIDE 50 MG: 50 INJECTION, SOLUTION INTRAMUSCULAR; INTRAVENOUS at 22:46

## 2019-08-28 RX ADMIN — ENOXAPARIN SODIUM 40 MG: 40 INJECTION SUBCUTANEOUS at 12:21

## 2019-08-28 RX ADMIN — POTASSIUM CHLORIDE AND SODIUM CHLORIDE 125 ML/HR: 900; 300 INJECTION, SOLUTION INTRAVENOUS at 11:44

## 2019-08-28 RX ADMIN — DIPHENHYDRAMINE HYDROCHLORIDE 50 MG: 50 INJECTION, SOLUTION INTRAMUSCULAR; INTRAVENOUS at 12:21

## 2019-08-28 RX ADMIN — BUTALBITAL, ACETAMINOPHEN AND CAFFEINE 1 TABLET: 50; 325; 40 TABLET ORAL at 20:44

## 2019-08-28 RX ADMIN — FLUCONAZOLE 200 MG: 40 POWDER, FOR SUSPENSION ORAL at 08:46

## 2019-08-28 RX ADMIN — SODIUM CHLORIDE 100 ML/HR: 0.9 INJECTION, SOLUTION INTRAVENOUS at 13:33

## 2019-08-28 NOTE — H&P (VIEW-ONLY)
[facility]  Progress Note - [facility] 53 y o  female MRN: [id number]    Unit/Bed#:  Encounter: [id number]    Assessment/Plan:  53y o  year old female with a PMH of mast cell activation syndrome, fibromyalgia and ulcerative colitis presented to the hospital for abdominal pain and chills and was found to have severe sepsis       1  Ulcerative colitis  2  Bacteremia  3  Severe sepsis  4  Abdominal pain  5  Rectal bleeding              -she has a history of ulcerative colitis and follows with Gastroenterology in [place], she is currently on Remicade every 6 weeks and had an infusion 1 5 weeks ago  -She was found to have sepsis with elevated WBC, tachycardia and fever  She has had previous admissions for sepsis within the past year at this hospital and [facility]  -Blood cultures were positive for gram-negative rods  Her bacteremia could be secondary to translocation of bacteria from her colon verses a non GI cause  UA on admission showed only trace blood  ID is following, she is being treated with Flagyl and cefepime  -CT of the abdomen and pelvis without contrast showed circumferential thickening in the rectum without pneumatosis, bowel obstruction or free air in the abdomen seen               -C diff stool test was negative   -her lactic acidosis has resolved              -she states she is tolerating a clear liquid diet, she is very limited in her food choices because of her allergies  I encouraged her to have her  bring safe food from home so that she can have PO intake               -outpatient colonoscopy to re-stage her disease was recommended, she strongly prefers an inpatient procedure  Will discuss with Dr Juan Chaney   She is clinically improved today      6  Esophageal candidiasis              -She states she was diagnosed with esophageal candidiasis which she has been battling, this was documented on a previous hospital admission as well               -continue Diflucan              -Ordered suspension rather than tablets due to corn allergy  Subjective:   She reports that she is feeling somewhat improved today, her abdominal pain is a little bit better  She continues to have straining for a bowel movement and passage of bright red blood per rectum  She denies any increase in rectal bleeding  Objective:     Vitals: Blood pressure 96/54, pulse 74, temperature (!) 97 3 °F (36 3 °C), temperature source Temporal, resp  rate 18, height 5' 5" (1 651 m), weight 63 5 kg (139 lb 15 9 oz), SpO2 99 %, not currently breastfeeding  ,Body mass index is 23 3 kg/m²  Intake/Output Summary (Last 24 hours) at 8/28/2019 1505  Last data filed at 8/28/2019 1336  Gross per 24 hour   Intake 3612 91 ml   Output 3200 ml   Net 412 91 ml       Review of Systems: as per HPI  Review of Systems   Constitutional: Positive for unexpected weight change  Negative for activity change, appetite change, chills, fatigue and fever  HENT: Negative for mouth sores, sore throat and trouble swallowing  Respiratory: Negative for shortness of breath  Cardiovascular: Negative for chest pain  Gastrointestinal: Positive for abdominal pain, blood in stool, constipation and diarrhea  Negative for abdominal distention, nausea and vomiting  Skin: Negative for color change, pallor, rash and wound  Neurological: Negative for tremors and syncope  All other systems reviewed and are negative  Physical Exam:     Physical Exam   Constitutional: She is oriented to person, place, and time  She appears well-developed and well-nourished  No distress  HENT:   Head: Normocephalic and atraumatic  Eyes: Right eye exhibits no discharge  Left eye exhibits no discharge  No scleral icterus  Neck: Neck supple  No tracheal deviation present  Cardiovascular: Normal rate, regular rhythm and normal heart sounds  Exam reveals no gallop and no friction rub     No murmur heard   Pulmonary/Chest: Effort normal and breath sounds normal  No stridor  No respiratory distress  She has no wheezes  She has no rales  Abdominal: Soft  Bowel sounds are normal  She exhibits no distension and no mass  There is tenderness (improved from yesterday, worst in RUQ)  There is no rebound and no guarding  Neurological: She is alert and oriented to person, place, and time  Skin: Skin is warm and dry  Psychiatric: She has a normal mood and affect           Invasive Devices     Peripheral Intravenous Line            Peripheral IV 08/26/19 Left Forearm 1 day    Peripheral IV 08/26/19 Right Forearm 1 day                        CBC:   Lab Results   Component Value Date    WBC 6 07 08/28/2019    HGB 13 1 08/28/2019    HCT 40 0 08/28/2019    MCV 98 08/28/2019     08/28/2019    MCH 32 2 08/28/2019    MCHC 32 8 08/28/2019    RDW 13 0 08/28/2019    MPV 9 2 08/28/2019    NRBC 0 08/28/2019   ,   CMP:   Lab Results   Component Value Date    K 4 4 08/28/2019     08/28/2019    CO2 25 08/28/2019    BUN 4 (L) 08/28/2019    CREATININE 0 73 08/28/2019    CALCIUM 8 3 08/28/2019    AST 22 08/28/2019    ALT 23 08/28/2019    ALKPHOS 43 (L) 08/28/2019    EGFR 99 08/28/2019   ,   Lipase: No results found for: LIPASE,  PT/INR: No results found for: PT, INR,   Troponin: No results found for: TROPONINI,   Magnesium: No components found for: MAG,   Phosphorous:   Lab Results   Component Value Date    PHOS 3 0 08/28/2019

## 2019-08-28 NOTE — PROGRESS NOTES
[facility] 1975, 53 y [facility]  female MRN: [id number]    Unit/Bed#:  Encounter: [id number]    Primary Care Provider: Dina Paez PA-C   Date and time admitted to hospital: 8/26/2019  3:12 PM        * Severe sepsis [facility])  Assessment & Plan  · Severe sepsis was present on admission and secondary to gram-negative bacteremia  · Continue IV cefepime  · I appreciate Infectious Disease's input  · Await the blood culture speciation and sensitivity results  · The lactic acidosis has resolved  · Follow the procalcitonin level    Results for Darcus Cure (MRN [id number]) as of 8/28/2019 13:21   Ref  Range 8/26/2019 15:44 8/26/2019 15:52 8/26/2019 16:08 8/26/2019 16:50 8/26/2019 17:50 8/26/2019 23:36 8/27/2019 00:37 8/27/2019 05:12   Procalcitonin Latest Ref Range: <=0 25 ng/ml 0 06      4 29 (H) 4 46 (H)       Bacteremia due to Gram-negative bacteria  Assessment & Plan  · Continue IV cefepime  · I appreciate Infectious Disease's input  · Await the blood culture speciation and sensitivity results  · The lactic acidosis has resolved  · Follow the procalcitonin level    Results for Darcus Cure (MRN [id number]) as of 8/28/2019 13:21   Ref  Range 8/26/2019 15:44 8/26/2019 15:52 8/26/2019 16:08 8/26/2019 16:50 8/26/2019 17:50 8/26/2019 23:36 8/27/2019 00:37 8/27/2019 05:12   Procalcitonin Latest Ref Range: <=0 25 ng/ml 0 06      4 29 (H) 4 46 (H)       Ulcerative colitis (HCC)  Assessment & Plan  · On chronic Remicade treatment  · Currently experiencing bloody diarrhea  · Check stool cultures including a Clostridium difficile culture, a Rotavirus stool antigen test, a stool culture for enteric bacterial pathogens, and stool for fecal leukocytes    · Consult Gastroenterology  · The patient is at high risk for DVT/venous thromboembolism in the setting of chronic ulcerative colitis, so she will be placed on lovenox 40 mg SQ every 24 hours DVT prophylaxis even in the setting of rectal bleeding    Immunosuppressed status ([facility] [address] )  Assessment & Plan  · The patient is currently receiving IVIG treatment  · Also on chronic Remicade treatment    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[facility] [address] )  Assessment & Plan  · The patient is followed at [facility]  · Continue her medication regimen per [facility]    Hypophosphatemia  Assessment & Plan  · Resolved with IV phosphorus supplementation  · Follow the phosphorus level    Hypokalemia  Assessment & Plan  · Resolved with IV potassium chloride supplementation  · Follow the potassium level and magnesium level    Vitamin D deficiency  Assessment & Plan  Outpatient follow-up with PCP in regards to this matter      Lactic acidosis  Assessment & Plan  · Secondary to severe sepsis  · Resolved      VTE Pharmacologic Prophylaxis:   Pharmacologic: Enoxaparin (Lovenox)  Mechanical VTE Prophylaxis in Place: Yes    Patient Centered Rounds: I have performed bedside rounds with nursing staff today  Time Spent for Care: 20 minutes  More than 50% of total time spent on counseling and coordination of care as described above  Current Length of Stay: 2 day(s)    Current Patient Status: Inpatient   Certification Statement: The patient will continue to require additional inpatient hospital stay due to the need for IV antibiotics and for continuous IV fluids  Code Status: Level 1 - Full Code      Subjective: The patient was seen and examined  The patient is doing better  The abdominal pain is improving  Objective:     Vitals:   Temp (24hrs), Av °F (36 7 °C), Min:97 1 °F (36 2 °C), Max:99 1 °F (37 3 °C)    Temp:  [97 1 °F (36 2 °C)-99 1 °F (37 3 °C)] 97 3 °F (36 3 °C)  HR:  [69-80] 74  Resp:  [16-18] 18  BP: ()/(50-70) 96/54  SpO2:  [97 %-99 %] 99 %  Body mass index is 23 3 kg/m²  Input and Output Summary (last 24 hours):        Intake/Output Summary (Last 24 hours) at 2019 1330  Last data filed at 2019 1144  Gross per 24 hour Intake 3799 58 ml   Output 3800 ml   Net -0 42 ml       Physical Exam:     Physical Exam  General:  NAD, awake, alert, follows commands  HEENT:  NC/AT, mucous membranes moist  Neck:  Supple, No JVP elevation  CV:  + S1, + S2, RRR  Pulm:  Lung fields are CTA bilaterally  Abd:  Soft, Mild generalized tenderness with palpation, Non-distended  Ext:  No clubbing/cyanosis/edema  Skin:  No rashes      Additional Data:    Labs:    Results from last 7 days   Lab Units 08/28/19  0617   WBC Thousand/uL 6 07   HEMOGLOBIN g/dL 13 1   HEMATOCRIT % 40 0   PLATELETS Thousands/uL 333   NEUTROS PCT % 42*   LYMPHS PCT % 34   MONOS PCT % 14*   EOS PCT % 8*     Results from last 7 days   Lab Units 08/28/19  0617   SODIUM mmol/L 138   POTASSIUM mmol/L 4 4   CHLORIDE mmol/L 107   CO2 mmol/L 25   BUN mg/dL 4*   CREATININE mg/dL 0 73   ANION GAP mmol/L 6   CALCIUM mg/dL 8 3   ALBUMIN g/dL 3 3*   TOTAL BILIRUBIN mg/dL 0 30   ALK PHOS U/L 43*   ALT U/L 23   AST U/L 22   GLUCOSE RANDOM mg/dL 84     Results from last 7 days   Lab Units 08/26/19  1544   INR  0 91             Results from last 7 days   Lab Units 08/27/19  0512 08/27/19  0037 08/26/19  1750 08/26/19  1544   LACTIC ACID mmol/L  --   --  1 6 2 3*   PROCALCITONIN ng/ml 4 46* 4 29*  --  0 06           * I Have Reviewed All Lab Data Listed Above  * Additional Pertinent [facility] Tests [facility]ed[facility]      Recent Cultures (last 7 days):     Results from last 7 days   Lab Units 08/27/19  1848 08/26/19  1608 08/26/19  1544   BLOOD CULTURE   --  Citrobacter freundii* No Growth at 24 hrs     GRAM STAIN RESULT   --  Gram negative rods*  --    C DIFF TOXIN B  NEGATIVE for C difficle toxin by PCR    --   --        Last 24 Hours Medication List:     Current Facility-Administered Medications:  acetaminophen 650 mg Oral Q6H PRN Evertt Boby, DO    butalbital-acetaminophen-caffeine 1 tablet Oral Q8H PRN Evertt Boby, DO    cefepime 2,000 mg Intravenous Q12H Lebron Lal, DO Last Rate: 2,000 mg (08/28/19 1221)   cetirizine 20 mg Oral BID Lebron Lopezcal, DO    diphenhydrAMINE 50 mg Intravenous Q4H PRN Lebron Lopezcal, DO    enoxaparin 40 mg Subcutaneous Q24H Lebron Lopezcal, DO    fluconazole 200 mg Oral Daily Anil Valente PA-C    HYDROmorphone 0 5 mg Intravenous Q4H PRN Lebron Lopezcal, DO    levothyroxine 50 mcg Oral Early Morning Lebron Lopezcal, DO    LORazepam 1 mg Oral Q8H PRN Lebron Lal, DO    montelukast 10 mg Oral BID Lebron Lal, DO    morphine injection 1 mg Intravenous Q4H PRN Lebron Lal, DO    pantoprazole 40 mg Oral BID AC Felipe Tripp, DO    ranitidine 300 mg Oral Q12H [facility] Felipe Tripp, DO    saccharomyces boulardii 250 mg Oral BID Lebron Lal, DO    sodium chloride 100 mL/hr Intravenous Continuous Lebron Lal, DO    temazepam 30 mg Oral HS Lebron Lal, DO         Today, Patient Was Seen By: Lebron Lal DO    ** Please Note: Dictation voice to text software may have been used in the creation of this document   **

## 2019-08-28 NOTE — ASSESSMENT & PLAN NOTE
· Continue IV cefepime  · I appreciate Infectious Disease's input  · Await the blood culture speciation and sensitivity results  · The lactic acidosis has resolved  · Follow the procalcitonin level    Results for Raquel Bowen (MRN [id number]) as of 8/28/2019 13:21   Ref   Range 8/26/2019 15:44 8/26/2019 15:52 8/26/2019 16:08 8/26/2019 16:50 8/26/2019 17:50 8/26/2019 23:36 8/27/2019 00:37 8/27/2019 05:12   Procalcitonin Latest Ref Range: <=0 25 ng/ml 0 06      4 29 (H) 4 46 (H)

## 2019-08-28 NOTE — ASSESSMENT & PLAN NOTE
· The patient is followed at [facility]  · Continue her medication regimen per [facility]

## 2019-08-28 NOTE — ASSESSMENT & PLAN NOTE
· Resolved with IV potassium chloride supplementation  · Follow the potassium level and magnesium level

## 2019-08-28 NOTE — ASSESSMENT & PLAN NOTE
· Severe sepsis was present on admission and secondary to gram-negative bacteremia  · Continue IV cefepime  · I appreciate Infectious Disease's input  · Await the blood culture speciation and sensitivity results  · The lactic acidosis has resolved  · Follow the procalcitonin level    Results for Richa Borden (MRN [id number]) as of 8/28/2019 13:21   Ref   Range 8/26/2019 15:44 8/26/2019 15:52 8/26/2019 16:08 8/26/2019 16:50 8/26/2019 17:50 8/26/2019 23:36 8/27/2019 00:37 8/27/2019 05:12   Procalcitonin Latest Ref Range: <=0 25 ng/ml 0 06      4 29 (H) 4 46 (H)

## 2019-08-28 NOTE — PROGRESS NOTES
[facility]  Progress Note - Bruna Pham 53 y o  female MRN: [id number]    Unit/Bed#:  Encounter: [id number]    Assessment/Plan:  53y o  year old female with a PMH of mast cell activation syndrome, fibromyalgia and ulcerative colitis presented to the hospital for abdominal pain and chills and was found to have severe sepsis       1  Ulcerative colitis  2  Bacteremia  3  Severe sepsis  4  Abdominal pain  5  Rectal bleeding              -she has a history of ulcerative colitis and follows with Gastroenterology in [facility], she is currently on Remicade every 6 weeks and had an infusion 1 5 weeks ago  -She was found to have sepsis with elevated WBC, tachycardia and fever  She has had previous admissions for sepsis within the past year at this hospital and [facility]  -Blood cultures were positive for gram-negative rods  Her bacteremia could be secondary to translocation of bacteria from her colon verses a non GI cause  UA on admission showed only trace blood  ID is following, she is being treated with Flagyl and cefepime  -CT of the abdomen and pelvis without contrast showed circumferential thickening in the rectum without pneumatosis, bowel obstruction or free air in the abdomen seen               -C diff stool test was negative   -her lactic acidosis has resolved              -she states she is tolerating a clear liquid diet, she is very limited in her food choices because of her allergies  I encouraged her to have her  bring safe food from home so that she can have PO intake               -outpatient colonoscopy to re-stage her disease was recommended, she strongly prefers an inpatient procedure  Will discuss with Dr Eloisa Bradshaw   She is clinically improved today      6  Esophageal candidiasis              -She states she was diagnosed with esophageal candidiasis which she has been battling, this was documented on a previous hospital admission as well               -continue Diflucan              -Ordered suspension rather than tablets due to corn allergy  Subjective:   She reports that she is feeling somewhat improved today, her abdominal pain is a little bit better  She continues to have straining for a bowel movement and passage of bright red blood per rectum  She denies any increase in rectal bleeding  Objective:     Vitals: Blood pressure 96/54, pulse 74, temperature (!) 97 3 °F (36 3 °C), temperature source Temporal, resp  rate 18, height 5' 5" (1 651 m), weight 63 5 kg (139 lb 15 9 oz), SpO2 99 %, not currently breastfeeding  ,Body mass index is 23 3 kg/m²  Intake/Output Summary (Last 24 hours) at 8/28/2019 1505  Last data filed at 8/28/2019 1336  Gross per 24 hour   Intake 3612 91 ml   Output 3200 ml   Net 412 91 ml       Review of Systems: as per HPI  Review of Systems   Constitutional: Positive for unexpected weight change  Negative for activity change, appetite change, chills, fatigue and fever  HENT: Negative for mouth sores, sore throat and trouble swallowing  Respiratory: Negative for shortness of breath  Cardiovascular: Negative for chest pain  Gastrointestinal: Positive for abdominal pain, blood in stool, constipation and diarrhea  Negative for abdominal distention, nausea and vomiting  Skin: Negative for color change, pallor, rash and wound  Neurological: Negative for tremors and syncope  All other systems reviewed and are negative  Physical Exam:     Physical Exam   Constitutional: She is oriented to person, place, and time  She appears well-developed and well-nourished  No distress  HENT:   Head: Normocephalic and atraumatic  Eyes: Right eye exhibits no discharge  Left eye exhibits no discharge  No scleral icterus  Neck: Neck supple  No tracheal deviation present  Cardiovascular: Normal rate, regular rhythm and normal heart sounds  Exam reveals no gallop and no friction rub     No murmur heard   Pulmonary/Chest: Effort normal and breath sounds normal  No stridor  No respiratory distress  She has no wheezes  She has no rales  Abdominal: Soft  Bowel sounds are normal  She exhibits no distension and no mass  There is tenderness (improved from yesterday, worst in RUQ)  There is no rebound and no guarding  Neurological: She is alert and oriented to person, place, and time  Skin: Skin is warm and dry  Psychiatric: She has a normal mood and affect           Invasive Devices     Peripheral Intravenous Line            Peripheral IV 08/26/19 Left Forearm 1 day    Peripheral IV 08/26/19 Right Forearm 1 day                        CBC:   Lab Results   Component Value Date    WBC 6 07 08/28/2019    HGB 13 1 08/28/2019    HCT 40 0 08/28/2019    MCV 98 08/28/2019     08/28/2019    MCH 32 2 08/28/2019    MCHC 32 8 08/28/2019    RDW 13 0 08/28/2019    MPV 9 2 08/28/2019    NRBC 0 08/28/2019   ,   CMP:   Lab Results   Component Value Date    K 4 4 08/28/2019     08/28/2019    CO2 25 08/28/2019    BUN 4 (L) 08/28/2019    CREATININE 0 73 08/28/2019    CALCIUM 8 3 08/28/2019    AST 22 08/28/2019    ALT 23 08/28/2019    ALKPHOS 43 (L) 08/28/2019    EGFR 99 08/28/2019   ,   Lipase: No results found for: LIPASE,  PT/INR: No results found for: PT, INR,   Troponin: No results found for: TROPONINI,   Magnesium: No components found for: MAG,   Phosphorous:   Lab Results   Component Value Date    PHOS 3 0 08/28/2019

## 2019-08-28 NOTE — TELEMEDICINE
Consultation - Infectious Disease   Demetri Rico 53 y o  female MRN: [id number]  Unit/Bed#:  Encounter: [id number]      Inpatient consult to Infectious Diseases  Consult performed by: Nathaniel Herbert MD  Consult ordered by: Samantha Asif DO            REQUIRED DOCUMENTATION:     1  This service was provided via Telemedicine  2  Provider located at Home  3  TeleMed provider: Nathaniel Herbert MD   4  Identify all parties in room with patient during tele consult:  RN  5  After connecting through Pairin, patient was identified by name and date of birth and assistant checked wristband  Patient was then informed that this was a Telemedicine visit and that the exam was being conducted confidentially over secure lines  My office door was closed  No one else was in the room  Patient acknowledged consent and understanding of privacy and security of the Telemedicine visit, and gave us permission to have the assistant stay in the room in order to assist with the history and to conduct the exam   I informed the patient that I have reviewed their record in Epic and presented the opportunity for them to ask any questions regarding the visit today  The patient agreed to participate  Assessment/Recommendations     8year-old female with ulcerative colitis on infliximab infusions, immunodeficiency on IVIG infusion presents with sepsis, Gram-negative bacteremia    1  Sepsis, present on admission with fever, tachycardia, leukocytosis and lactic acidosis  - Source of sepsis is bacteremia  - Clinically improving, afebrile without leukocytosis    · Management as below    2  Gram-negative bacteremia  - suspect the translocation in the setting of ulcerative colitis/proctitis and rectal bleeding  - blood cultures enteric gram negatives in 1 set  - CT imaging notes nonspecific rectal wall thickening    Patient had an episode of rectal bleeding on presentation  - evaluated by GI, active UC flare not suspected    · Continue cefepime  · Continue Flagyl  · Follow-up blood cultures  · Anticipate 2 weeks of therapy    3  Oropharyngeal/esophageal candidiasis    · Complete 2 weeks of oral fluconazole 200 mg daily    4  Ulcerative colitis on infliximab infusion  - No evidence of active UC flare  · Management per GI    5  Mast cell activation syndrome, 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    · Continue ivIg infusions, fu with Immunology      Thank you for involving me in the care of your patient  Please call with questions, change in clinical status or if tests recommended above are abnormal      Discussed with the primary service  History     Reason for Consult: Bacteremia  HPI: Ronald May is a 53y o  year old female with ulcerative colitis on infliximab infusions  She was also recently diagnosed with mast cell activation syndrome and possible immunodeficiency disorder in July 2019 and [facility]  She has been feeling generally unwell with non specific generalized lower abdominal pain as well as small caliber sized stools with relative constipation for the past month  She received her scheduled Remicade infusion two weeks ago and had a reaction but completed the infusion Despite that her symptoms persisted  She also noted light bright red rectal bleeding  2-3 days before presentation she began to note generalized fatigue, aches and pains  She presented to the ER on 08/26 when she developed an acute episode of red bleeding per rectum which was moderate in amount and associated with lower abdominal pressure, urgency and tenesmus as well as fever and chills  In the ER, she was febrile to 102 6, tachycardic and tachypneic  Labs were notable for lactic acidosis with leukocytosis  EKG noted no acute ST changes  CXR showed no infiltrate and CT abdomen showed circumferential thickening of the rectum s/o proctitis  She was admitted and started on Levaquin and Flagyl    Blood cultures from admission have grown gram-negative rods  She was switched to cefepime which she is tolerating  She was also started on fluconazole due to history of oropharyngeal candidiasis and recent painful swallowing  She has been afebrile overnight  White count is normal   Denies nausea, vomiting, diarrhea or rash and is tolerating antibiotics well  Infectious disease is being consulted for diagnostic work up and antibiotic management  Review of Systems  A [id number] point system-based review of systems is otherwise negative  PAST MEDICAL HISTORY:  Past Medical History:   Diagnosis Date    Anxiety     Asthma     Chronic kidney disease     Deep vein thrombosis (HCC)     Disease of thyroid gland     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    Generalized anxiety disorder 8/26/2017    Heart murmur     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HCC)     Migraine     Myocardial infarction ([facility] )     NSTEMI  pt denies, documented in cardio note    Pancreatitis     sphinger of     Psychiatric disorder     RA (rheumatoid arthritis) ([facility] )     Ulcerative colitis ([facility] )      Past Surgical History:   Procedure Laterality Date    APPENDECTOMY      CHOLECYSTECTOMY      EGD AND COLONOSCOPY N/A 9/12/2017    Procedure: EGD AND COLONOSCOPY;  Surgeon: Millie Jeffers MD;  [facility]: [facility]; Service: Gastroenterology    ERCP N/A 9/15/2017    Procedure: ENDOSCOPIC RETROGRADE CHOLANGIOPANCREATOGRAPHY (ERCP); Surgeon: Marina Reynolds MD;  [facility];   Service: Gastroenterology    HYSTERECTOMY      KNEE SURGERY Right     LAPAROSCOPIC ENDOMETRIOSIS FULGURATION      ORIF ANKLE FRACTURE Left     NH KNEE SCOPE,MED/LAT MENISECTOMY Left 5/12/2017    Procedure: POSSIBLE MEDIAL MENISECTOMY;  Surgeon: Felisah Francis MD;  Location: [facility];  Service: Orthopedics    NH KNEE [address] CART Left 5/12/2017    Procedure: KNEE ARTHROSCOPY EVALUATION, CHONDROPLASTY;  Surgeon: Felisha Francis MD;  Location: [facility]; Service: Orthopedics    MO LAP,DIAGNOSTIC ABDOMEN N/A 2017    Procedure: LAPAROSCOPY DIAGNOSTIC  WITH LYSIS OF ADHESIONS;  Surgeon: Thanh Venegas DO;  [facility];  Service: General       FAMILY HISTORY:  Non-contributory    SOCIAL HISTORY:  Social History   /Civil Union  Social History     Substance and Sexual Activity   Alcohol Use Never    Frequency: Never    Binge frequency: Never     Social History     Substance and Sexual Activity   Drug Use Not Currently     Social History     Tobacco Use   Smoking Status Never Smoker   Smokeless Tobacco Never Used       ALLERGIES:  Allergies   Allergen Reactions    Amitriptyline Anaphylaxis     According to the patient she had nphylaxis    Aspergillus Fumigatus Other (See Comments), Hives and Swelling    Azathioprine Other (See Comments) and Anaphylaxis     pancreatitis  According to patient she needed Epipen    Sumatriptan Anaphylaxis     Bradycardia, sob, back pressure, head pain,throat tightness  According to the patient she had anphylaxis    Contrast [Iodinated Diagnostic Agents]      Pt states needs to be premedicated prior to injection   [employer] Dextrin      Any corn based products    Corn Oil Hives    Gelatin     Histamine      Intolerance      Ibuprofen Hives and Throat Swelling    Malto Dextrin [Dextrin]     Other      Gel caps, maltodextrose, dextrose    Penicillins Hives and Throat Swelling    Penicillium Notatum     Sulfa Antibiotics Hives and Other (See Comments)    Sulfate     Sulfites        MEDICATIONS:  All current active medications have been reviewed      Physical Exam     Temp:  [97 1 °F (36 2 °C)-99 1 °F (37 3 °C)] 97 3 °F (36 3 °C)  HR:  [69-83] 74  Resp:  [16-20] 18  BP: ()/(50-70) 96/54  SpO2:  [97 %-99 %] 99 %  Temp (24hrs), Av °F (36 7 °C), Min:97 1 °F (36 2 °C), Max:99 1 °F (37 3 °C)  Current: Temperature: (!) 97 3 °F (36 3 °C)    Intake/Output Summary (Last 24 hours) at 2019 0857  Last data filed at 8/28/2019 1338  Gross per 24 hour   Intake 2414 16 ml   Output 4600 ml   Net -2185 84 ml           Physical exam findings reported by bedside and primary medical team staff    General Appearance:  Appearing tired but nontoxic, and in no distress, appears stated age   Head:  Normocephalic, without obvious abnormality, atraumatic   Eyes:  PERRL, conjunctiva pink and sclera anicteric, both eyes   Nose: Nares normal, mucosa normal, no drainage   Throat: Oropharynx moist without lesions; lips, mucosa, and tongue normal; teeth and gums normal   Neck: Supple, symmetrical, trachea midline, no adenopathy, no tenderness/mass/nodules   Back:   Symmetric, no curvature, ROM normal, no CVA tenderness   Lungs:   Clear to auscultation bilaterally, no audible wheezes, rhonchi and rales, respirations unlabored   Chest Wall:  No tenderness or deformity   Heart:  Regular rate and rhythm, S1, S2 normal, no murmur, rub or gallop   Abdomen:   Soft, generalized tenderness to palpation, non-distended, positive bowel sounds, no masses, no organomegaly    No CVA tenderness   Extremities: Extremities normal, atraumatic, no cyanosis, clubbing or edema   Skin: Skin color, texture, turgor normal, no rashes or lesions  No draining wounds noted  Lymph nodes: Cervical, supraclavicular, and axillary nodes normal   Neurologic: Alert and oriented times 3, extremity strength 5/5 and symmetric       Invasive Devices:   Peripheral IV 08/26/19 Left Forearm (Active)   Site Assessment Clean;Dry; Intact 8/27/2019  7:29 PM   Dressing Type Transparent 8/27/2019  7:29 PM   Line Status Flushed;Saline locked 8/27/2019  7:29 PM   Dressing Status Clean;Dry; Intact 8/27/2019  7:29 PM       Peripheral IV 08/26/19 Right Forearm (Active)   Site Assessment Clean;Dry; Intact 8/27/2019  7:29 PM   Dressing Type Transparent 8/27/2019  7:29 PM   Line Status Flushed; Infusing 8/27/2019  7:29 PM   Dressing Status Clean;Dry; Intact 8/27/2019  7:29 PM       Labs, Imaging, & Other Studies     Lab Results:    I have personally reviewed pertinent labs  Results from last 7 days   Lab Units 08/28/19  0617 08/27/19  0512 08/26/19  2336 08/26/19  1544   WBC Thousand/uL 6 07 16 72*  --  17 33*   HEMOGLOBIN g/dL 13 1 12 6 13 1 14 3   PLATELETS Thousands/uL 333 335 337 352     Results from last 7 days   Lab Units 08/28/19  0617  08/26/19  1544   POTASSIUM mmol/L 4 4   < > 3 7   CHLORIDE mmol/L 107   < > 102   CO2 mmol/L 25   < > 22   BUN mg/dL 4*   < > 18   CREATININE mg/dL 0 73   < > 1 06   EGFR ml/min/1 73sq m 99   < > 63   CALCIUM mg/dL 8 3   < > 9 3   AST U/L 22  --  33   ALT U/L 23  --  25   ALK PHOS U/L 43*  --  56    < > = values in this interval not displayed  Results from last 7 days   Lab Units 08/27/19  1848 08/26/19  1608 08/26/19  1544   BLOOD CULTURE   --  Gram Negative Abilio Enteric Like* No Growth at 24 hrs  GRAM STAIN RESULT   --  Gram negative rods*  --    C DIFF TOXIN B  NEGATIVE for C difficle toxin by PCR    --   --        Imaging Studies:   I have personally reviewed pertinent imaging study reports and images in PACS  EKG, Pathology, and Other Studies:   I have personally reviewed pertinent reports  Counseling/Coordination of care: Total 70 minutes communication with the patient via telehealth  Labs, medical tests and imaging studies were independently reviewed by me as noted above in HPI and old records were obtained and summarized as noted above in HPI

## 2019-08-29 ENCOUNTER — ANESTHESIA EVENT (INPATIENT)
Dept: PERIOP | Facility: HOSPITAL | Age: 44
DRG: 872 | End: 2019-08-29
Payer: COMMERCIAL

## 2019-08-29 LAB
ALBUMIN SERPL BCP-MCNC: 3.2 G/DL (ref 3.5–5)
ALP SERPL-CCNC: 46 U/L (ref 46–116)
ALT SERPL W P-5'-P-CCNC: 22 U/L (ref 12–78)
ANION GAP SERPL CALCULATED.3IONS-SCNC: 7 MMOL/L (ref 4–13)
AST SERPL W P-5'-P-CCNC: 21 U/L (ref 5–45)
BACTERIA BLD CULT: ABNORMAL
BASOPHILS # BLD AUTO: 0.11 THOUSANDS/ΜL (ref 0–0.1)
BASOPHILS NFR BLD AUTO: 2 % (ref 0–1)
BILIRUB SERPL-MCNC: 0.3 MG/DL (ref 0.2–1)
BUN SERPL-MCNC: 4 MG/DL (ref 5–25)
CALCIUM SERPL-MCNC: 8.4 MG/DL (ref 8.3–10.1)
CAMPYLOBACTER DNA SPEC NAA+PROBE: NORMAL
CHLORIDE SERPL-SCNC: 105 MMOL/L (ref 100–108)
CO2 SERPL-SCNC: 28 MMOL/L (ref 21–32)
CREAT SERPL-MCNC: 0.84 MG/DL (ref 0.6–1.3)
EOSINOPHIL # BLD AUTO: 0.36 THOUSAND/ΜL (ref 0–0.61)
EOSINOPHIL NFR BLD AUTO: 7 % (ref 0–6)
ERYTHROCYTE [DISTWIDTH] IN BLOOD BY AUTOMATED COUNT: 12.8 % (ref 11.6–15.1)
GFR SERPL CREATININE-BSD FRML MDRD: 84 ML/MIN/1.73SQ M
GLUCOSE SERPL-MCNC: 83 MG/DL (ref 65–140)
GRAM STN SPEC: ABNORMAL
HCT VFR BLD AUTO: 42.7 % (ref 34.8–46.1)
HGB BLD-MCNC: 14 G/DL (ref 11.5–15.4)
IMM GRANULOCYTES # BLD AUTO: 0.01 THOUSAND/UL (ref 0–0.2)
IMM GRANULOCYTES NFR BLD AUTO: 0 % (ref 0–2)
LYMPHOCYTES # BLD AUTO: 1.93 THOUSANDS/ΜL (ref 0.6–4.47)
LYMPHOCYTES NFR BLD AUTO: 37 % (ref 14–44)
MAGNESIUM SERPL-MCNC: 2 MG/DL (ref 1.6–2.6)
MCH RBC QN AUTO: 32.4 PG (ref 26.8–34.3)
MCHC RBC AUTO-ENTMCNC: 32.8 G/DL (ref 31.4–37.4)
MCV RBC AUTO: 99 FL (ref 82–98)
MONOCYTES # BLD AUTO: 0.66 THOUSAND/ΜL (ref 0.17–1.22)
MONOCYTES NFR BLD AUTO: 13 % (ref 4–12)
NEUTROPHILS # BLD AUTO: 2.18 THOUSANDS/ΜL (ref 1.85–7.62)
NEUTS SEG NFR BLD AUTO: 41 % (ref 43–75)
NRBC BLD AUTO-RTO: 0 /100 WBCS
PHOSPHATE SERPL-MCNC: 3.2 MG/DL (ref 2.7–4.5)
PLATELET # BLD AUTO: 369 THOUSANDS/UL (ref 149–390)
PMV BLD AUTO: 9 FL (ref 8.9–12.7)
POTASSIUM SERPL-SCNC: 4.1 MMOL/L (ref 3.5–5.3)
PROCALCITONIN SERPL-MCNC: 1.05 NG/ML
PROT SERPL-MCNC: 6.7 G/DL (ref 6.4–8.2)
RBC # BLD AUTO: 4.32 MILLION/UL (ref 3.81–5.12)
SALMONELLA DNA SPEC QL NAA+PROBE: NORMAL
SHIGA TOXIN STX GENE SPEC NAA+PROBE: NORMAL
SHIGELLA DNA SPEC QL NAA+PROBE: NORMAL
SODIUM SERPL-SCNC: 140 MMOL/L (ref 136–145)
WBC # BLD AUTO: 5.25 THOUSAND/UL (ref 4.31–10.16)

## 2019-08-29 PROCEDURE — 87040 BLOOD CULTURE FOR BACTERIA: CPT | Performed by: INTERNAL MEDICINE

## 2019-08-29 PROCEDURE — 83735 ASSAY OF MAGNESIUM: CPT | Performed by: INTERNAL MEDICINE

## 2019-08-29 PROCEDURE — 99232 SBSQ HOSP IP/OBS MODERATE 35: CPT | Performed by: INTERNAL MEDICINE

## 2019-08-29 PROCEDURE — 84145 PROCALCITONIN (PCT): CPT | Performed by: INTERNAL MEDICINE

## 2019-08-29 PROCEDURE — 84100 ASSAY OF PHOSPHORUS: CPT | Performed by: INTERNAL MEDICINE

## 2019-08-29 PROCEDURE — 85025 COMPLETE CBC W/AUTO DIFF WBC: CPT | Performed by: INTERNAL MEDICINE

## 2019-08-29 PROCEDURE — 80053 COMPREHEN METABOLIC PANEL: CPT | Performed by: INTERNAL MEDICINE

## 2019-08-29 RX ORDER — RANITIDINE 150 MG/1
150 TABLET ORAL ONCE
Status: COMPLETED | OUTPATIENT
Start: 2019-08-30 | End: 2019-08-30

## 2019-08-29 RX ORDER — DIPHENHYDRAMINE HYDROCHLORIDE 50 MG/ML
50 INJECTION INTRAMUSCULAR; INTRAVENOUS ONCE
Status: COMPLETED | OUTPATIENT
Start: 2019-08-30 | End: 2019-08-30

## 2019-08-29 RX ORDER — MONTELUKAST SODIUM 10 MG/1
10 TABLET ORAL ONCE
Status: COMPLETED | OUTPATIENT
Start: 2019-08-30 | End: 2019-08-30

## 2019-08-29 RX ADMIN — DIPHENHYDRAMINE HYDROCHLORIDE 50 MG: 50 INJECTION, SOLUTION INTRAMUSCULAR; INTRAVENOUS at 23:45

## 2019-08-29 RX ADMIN — CETIRIZINE HYDROCHLORIDE 20 MG: 10 TABLET ORAL at 17:23

## 2019-08-29 RX ADMIN — PANTOPRAZOLE SODIUM 40 MG: 40 TABLET, DELAYED RELEASE ORAL at 12:16

## 2019-08-29 RX ADMIN — HYDROMORPHONE HYDROCHLORIDE 0.5 MG: 1 INJECTION, SOLUTION INTRAMUSCULAR; INTRAVENOUS; SUBCUTANEOUS at 18:37

## 2019-08-29 RX ADMIN — ENOXAPARIN SODIUM 40 MG: 40 INJECTION SUBCUTANEOUS at 12:16

## 2019-08-29 RX ADMIN — LORAZEPAM 1 MG: 1 TABLET ORAL at 13:58

## 2019-08-29 RX ADMIN — DIPHENHYDRAMINE HYDROCHLORIDE 50 MG: 50 INJECTION, SOLUTION INTRAMUSCULAR; INTRAVENOUS at 12:16

## 2019-08-29 RX ADMIN — CEFEPIME HYDROCHLORIDE 2000 MG: 2 INJECTION, POWDER, FOR SOLUTION INTRAVENOUS at 12:16

## 2019-08-29 RX ADMIN — CEFEPIME HYDROCHLORIDE 2000 MG: 2 INJECTION, POWDER, FOR SOLUTION INTRAVENOUS at 23:46

## 2019-08-29 RX ADMIN — HYDROMORPHONE HYDROCHLORIDE 0.5 MG: 1 INJECTION, SOLUTION INTRAMUSCULAR; INTRAVENOUS; SUBCUTANEOUS at 23:44

## 2019-08-29 RX ADMIN — Medication 250 MG: at 10:27

## 2019-08-29 RX ADMIN — MONTELUKAST 10 MG: 10 TABLET, FILM COATED ORAL at 17:23

## 2019-08-29 RX ADMIN — HYDROMORPHONE HYDROCHLORIDE 0.5 MG: 1 INJECTION, SOLUTION INTRAMUSCULAR; INTRAVENOUS; SUBCUTANEOUS at 06:26

## 2019-08-29 RX ADMIN — PREDNISONE 50 MG: 20 TABLET ORAL at 16:17

## 2019-08-29 RX ADMIN — SODIUM CHLORIDE 100 ML/HR: 0.9 INJECTION, SOLUTION INTRAVENOUS at 21:01

## 2019-08-29 RX ADMIN — RANITIDINE 300 MG: 150 TABLET ORAL at 16:18

## 2019-08-29 RX ADMIN — RANITIDINE 300 MG: 150 TABLET ORAL at 12:16

## 2019-08-29 RX ADMIN — ONDANSETRON 4 MG: 2 INJECTION INTRAMUSCULAR; INTRAVENOUS at 22:33

## 2019-08-29 RX ADMIN — HYDROMORPHONE HYDROCHLORIDE 0.5 MG: 1 INJECTION, SOLUTION INTRAMUSCULAR; INTRAVENOUS; SUBCUTANEOUS at 14:51

## 2019-08-29 RX ADMIN — DIPHENHYDRAMINE HYDROCHLORIDE 50 MG: 50 INJECTION, SOLUTION INTRAMUSCULAR; INTRAVENOUS at 20:19

## 2019-08-29 RX ADMIN — CETIRIZINE HYDROCHLORIDE 20 MG: 10 TABLET ORAL at 12:16

## 2019-08-29 RX ADMIN — SODIUM CHLORIDE 100 ML/HR: 0.9 INJECTION, SOLUTION INTRAVENOUS at 10:26

## 2019-08-29 RX ADMIN — MONTELUKAST 10 MG: 10 TABLET, FILM COATED ORAL at 12:16

## 2019-08-29 RX ADMIN — HYDROMORPHONE HYDROCHLORIDE 0.5 MG: 1 INJECTION, SOLUTION INTRAMUSCULAR; INTRAVENOUS; SUBCUTANEOUS at 10:26

## 2019-08-29 RX ADMIN — FLUCONAZOLE 200 MG: 40 POWDER, FOR SUSPENSION ORAL at 10:27

## 2019-08-29 RX ADMIN — PANTOPRAZOLE SODIUM 40 MG: 40 TABLET, DELAYED RELEASE ORAL at 16:18

## 2019-08-29 RX ADMIN — ONDANSETRON 4 MG: 2 INJECTION INTRAMUSCULAR; INTRAVENOUS at 06:26

## 2019-08-29 RX ADMIN — DIPHENHYDRAMINE HYDROCHLORIDE 50 MG: 50 INJECTION, SOLUTION INTRAMUSCULAR; INTRAVENOUS at 16:17

## 2019-08-29 RX ADMIN — POLYETHYLENE GLYCOL 3350, SODIUM SULFATE ANHYDROUS, SODIUM BICARBONATE, SODIUM CHLORIDE, POTASSIUM CHLORIDE 4000 ML: 236; 22.74; 6.74; 5.86; 2.97 POWDER, FOR SOLUTION ORAL at 16:17

## 2019-08-29 RX ADMIN — BUTALBITAL, ACETAMINOPHEN AND CAFFEINE 1 TABLET: 50; 325; 40 TABLET ORAL at 21:05

## 2019-08-29 RX ADMIN — DIPHENHYDRAMINE HYDROCHLORIDE 50 MG: 50 INJECTION, SOLUTION INTRAMUSCULAR; INTRAVENOUS at 07:57

## 2019-08-29 NOTE — PROGRESS NOTES
Progress Note - Marcelo Jason 1975, 53 y o  female MRN: [id number]    Unit/Bed#:  Encounter: [id number]    Primary Care Provider: Mary Reyes PA-C   Date and time admitted to hospital: 8/26/2019  3:12 PM        * Severe sepsis [facility])  Assessment & Plan  · Severe sepsis was present on admission and secondary to gram-negative bacteremia  · Continue IV cefepime  · The patient was seen in consultation by Infectious Disease  · The lactic acidosis has resolved  · Follow the procalcitonin level  · The patient will require a total of a 14-day antibiotic course per Infectious Disease's recommendations      Bacteremia due to Gram-negative bacteria  Assessment & Plan  · Severe sepsis was present on admission and secondary to gram-negative bacteremia  · The gram-negative bacteremia is secondary to Citrobacter freundii  · Continue IV cefepime  · The patient was seen in consultation by Infectious Disease  · The lactic acidosis has resolved  · Follow the procalcitonin level  · The patient will require a total of a 14-day antibiotic course per Infectious Disease's recommendations      Ulcerative colitis ([facility] )  Assessment & Plan  · On chronic Remicade treatment  · Currently experiencing bloody diarrhea  · Check stool cultures including a Clostridium difficile culture, a Rotavirus stool antigen test, a stool culture for enteric bacterial pathogens, and stool for fecal leukocytes    · The patient was seen in consultation by Gastroenterology  · The patient will undergo a colonoscopy on Friday, 08/30/2019  · The patient is at high risk for DVT/venous thromboembolism in the setting of chronic ulcerative colitis, so she will be placed on lovenox 40 mg SQ every 24 hours DVT prophylaxis even in the setting of rectal bleeding    Immunosuppressed status ([facility] )  Assessment & Plan  · The patient is currently receiving IVIG treatment  · Also on chronic Remicade treatment    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  Assessment & Plan  · The patient is followed at [facility]  · Continue her medication regimen per [facility]    Hypophosphatemia  Assessment & Plan  · Resolved with IV phosphorus supplementation  · Follow the phosphorus level    Hypokalemia  Assessment & Plan  · Resolved with IV potassium chloride supplementation  · Follow the potassium level and magnesium level    Vitamin D deficiency  Assessment & Plan  Outpatient follow-up with PCP in regards to this matter      Lactic acidosis  Assessment & Plan  · Secondary to severe sepsis  · Resolved      VTE Pharmacologic Prophylaxis:   Pharmacologic: Enoxaparin (Lovenox)  Mechanical VTE Prophylaxis in Place: Yes    Patient Centered Rounds: I have performed bedside rounds with nursing staff today  Time Spent for Care: 20 minutes  More than 50% of total time spent on counseling and coordination of care as described above  Current Length of Stay: 3 day(s)    Current Patient Status: Inpatient   Certification Statement: The patient will continue to require additional inpatient hospital stay due to the need for IV antibiotics and the need for a colonoscopy on Friday, 2019  Code Status: Level 1 - Full Code      Subjective: The patient was seen and examined  The patient is doing better  She was experiencing diarrhea after eating solid foods on 2019  No chest pain  No shortness of breath  No abdominal pain  No nausea or vomiting  Objective:     Vitals:   Temp (24hrs), Av 1 °F (36 7 °C), Min:97 9 °F (36 6 °C), Max:98 5 °F (36 9 °C)    Temp:  [97 9 °F (36 6 °C)-98 5 °F (36 9 °C)] 98 1 °F (36 7 °C)  HR:  [68-79] 79  Resp:  [16-18] 16  BP: ()/(50-59) 116/59  SpO2:  [98 %-99 %] 99 %  Body mass index is 21 92 kg/m²  Input and Output Summary (last 24 hours):        Intake/Output Summary (Last 24 hours) at 2019 1230  Last data filed at 2019 1026  Gross per 24 hour   Intake 2801 66 ml   Output 2400 ml   Net 401 66 ml Physical Exam:     Physical Exam  General:  NAD, awake, alert, follows commands  HEENT:  NC/AT, mucous membranes moist  Neck:  Supple, No JVP elevation  CV:  + S1, + S2, RRR  Pulm:  Lung fields are CTA bilaterally  Abd:  Soft, Non-tender, Non-distended  Ext:  No clubbing/cyanosis/edema  Skin:  No rashes      Additional Data:    Labs:    Results from last 7 days   Lab Units 08/29/19  0623   WBC Thousand/uL 5 25   HEMOGLOBIN g/dL 14 0   HEMATOCRIT % 42 7   PLATELETS Thousands/uL 369   NEUTROS PCT % 41*   LYMPHS PCT % 37   MONOS PCT % 13*   EOS PCT % 7*     Results from last 7 days   Lab Units 08/29/19  0623   SODIUM mmol/L 140   POTASSIUM mmol/L 4 1   CHLORIDE mmol/L 105   CO2 mmol/L 28   BUN mg/dL 4*   CREATININE mg/dL 0 84   ANION GAP mmol/L 7   CALCIUM mg/dL 8 4   ALBUMIN g/dL 3 2*   TOTAL BILIRUBIN mg/dL 0 30   ALK PHOS U/L 46   ALT U/L 22   AST U/L 21   GLUCOSE RANDOM mg/dL 83     Results from last 7 days   Lab Units 08/26/19  1544   INR  0 91             Results from last 7 days   Lab Units 08/28/19  0617 08/27/19  0512 08/27/19  0037 08/26/19  1750 08/26/19  1544   LACTIC ACID mmol/L  --   --   --  1 6 2 3*   PROCALCITONIN ng/ml 2 11* 4 46* 4 29*  --  0 06           * I Have Reviewed All Lab Data Listed Above  * Additional Pertinent Lab Tests Reviewed: [facility]      Recent Cultures (last 7 days):     Results from last 7 days   Lab Units 08/27/19  1848 08/27/19  1644 08/26/19  1608 08/26/19  1544   BLOOD CULTURE   --   --  Citrobacter freundii* No Growth at 48 hrs     GRAM STAIN RESULT   --   --  Gram negative rods*  --    URINE CULTURE   --  No Growth <1000 cfu/mL  --   --    C DIFF TOXIN B  NEGATIVE for C difficle toxin by PCR    --   --   --        Last 24 Hours Medication List:     Current Facility-Administered Medications:  acetaminophen 650 mg Oral Q6H PRN Morrison Fresh, DO    butalbital-acetaminophen-caffeine 1 tablet Oral Q8H PRN Morrison Fresh, DO cefepime 2,000 mg Intravenous Q12H [employer], DO Last Rate: 2,000 mg (08/29/19 1216)   cetirizine 20 mg Oral BID [employer], DO    diphenhydrAMINE 50 mg Intravenous Q4H PRN [employer], DO    [START ON 8/30/2019] diphenhydrAMINE 50 mg Intravenous Once Susannah Mckenzie PA-C    enoxaparin 40 mg Subcutaneous Q24H [employer], DO    fluconazole 200 mg Oral Daily Susannah Mckenzie PA-C    HYDROmorphone 0 5 mg Intravenous Q4H PRN [employer], DO    levothyroxine 50 mcg Oral Early Morning [employer], DO    LORazepam 1 mg Oral Q8H PRN [employer], DO    montelukast 10 mg Oral BID [employer], DO    [START ON 8/30/2019] montelukast 10 mg Oral Once Susannah Mckenzie PA-C    morphine injection 1 mg Intravenous Q4H PRN [employer], DO    ondansetron 4 mg Intravenous Q6H PRN [employer], DO    pantoprazole 40 mg Oral BID AC Felipe Tripp,     polyethylene glycol 4,000 mL Oral Once Susannah Mckenzie PA-C    [START ON 8/30/2019] predniSONE 50 mg Oral Once Susannah Mckenzie PA-C    [START ON 8/30/2019] ranitidine 150 mg Oral Once Susannah Mckenzie PA-C    ranitidine 300 mg Oral Q12H [address] Felipe Tripp DO    saccharomyces boulardii 250 mg Oral BID [employer], DO    sodium chloride 100 mL/hr Intravenous Continuous [employer], DO Last Rate: 100 mL/hr (08/29/19 1026)   temazepam 30 mg Oral HS [employer], DO         Today, Patient Was Seen By: [employer],     ** Please Note: Dictation voice to text software may have been used in the creation of this document   **

## 2019-08-29 NOTE — ASSESSMENT & PLAN NOTE
· On chronic Remicade treatment  · Currently experiencing bloody diarrhea  · Check stool cultures including a Clostridium difficile culture, a Rotavirus stool antigen test, a stool culture for enteric bacterial pathogens, and stool for fecal leukocytes    · The patient was seen in consultation by Gastroenterology  · The patient will undergo a colonoscopy on Friday, 08/30/2019  · The patient is at high risk for DVT/venous thromboembolism in the setting of chronic ulcerative colitis, so she will be placed on lovenox 40 mg SQ every 24 hours DVT prophylaxis even in the setting of rectal bleeding

## 2019-08-29 NOTE — ASSESSMENT & PLAN NOTE
· The patient is followed at [facility]  · Continue her medication regimen per [facility]

## 2019-08-29 NOTE — ASSESSMENT & PLAN NOTE
· Severe sepsis was present on admission and secondary to gram-negative bacteremia  · Continue IV cefepime  · The patient was seen in consultation by Infectious Disease  · The lactic acidosis has resolved  · Follow the procalcitonin level  · The patient will require a total of a 14-day antibiotic course per Infectious Disease's recommendations

## 2019-08-29 NOTE — PLAN OF CARE
Problem: Potential for Falls  Goal: Patient will remain free of falls  Description  INTERVENTIONS:  - Assess patient frequently for physical needs  -  Identify cognitive and physical deficits and behaviors that affect risk of falls  -  [place] fall precautions as indicated by assessment   - Educate patient/family on patient safety including physical limitations  - Instruct patient to call for assistance with activity based on assessment  - Modify environment to reduce risk of injury  - Consider OT/PT consult to assist with strengthening/mobility  Outcome: Progressing     Problem: Nutrition/Hydration-ADULT  Goal: Nutrient/Hydration intake appropriate for improving, restoring or maintaining nutritional needs  Description  Monitor and assess patient's nutrition/hydration status for malnutrition  Collaborate with interdisciplinary team and initiate plan and interventions as ordered  Monitor patient's weight and dietary intake as ordered or per policy  Utilize nutrition screening tool and intervene as necessary  Determine patient's food preferences and provide high-protein, high-caloric foods as appropriate       INTERVENTIONS:  - Monitor oral intake, urinary output, labs, and treatment plans  - Assess nutrition and hydration status and recommend course of action  - Evaluate amount of meals eaten  - Assist patient with eating if necessary   - Allow adequate time for meals  - Recommend/ encourage appropriate diets, oral nutritional supplements, and vitamin/mineral supplements  - Order, calculate, and assess calorie counts as needed  - Recommend, monitor, and adjust tube feedings and TPN/PPN based on assessed needs  - Assess need for intravenous fluids  - Provide specific nutrition/hydration education as appropriate  - Include patient/family/caregiver in decisions related to nutrition  Outcome: Progressing     Problem: PAIN - ADULT  Goal: Verbalizes/displays adequate comfort level or baseline comfort level  Description  Interventions:  - Encourage patient to monitor pain and request assistance  - Assess pain using appropriate pain scale  - Administer analgesics based on type and severity of pain and evaluate response  - Implement non-pharmacological measures as appropriate and evaluate response  - Consider cultural and social influences on pain and pain management  - Notify physician/advanced practitioner if interventions unsuccessful or patient reports new pain  Outcome: Progressing     Problem: INFECTION - ADULT  Goal: Absence or prevention of progression during hospitalization  Description  INTERVENTIONS:  - Assess and monitor for signs and symptoms of infection  - Monitor lab/diagnostic results  - Monitor all insertion sites, i e  indwelling lines, tubes, and drains  - Administer medications as ordered  - Instruct and encourage patient and family to use good hand hygiene technique   Outcome: Progressing     Problem: SAFETY ADULT  Goal: Maintain or return to baseline ADL function  Description  INTERVENTIONS:  -  Assess patient's ability to carry out ADLs; assess patient's baseline for ADL function and identify physical deficits which impact ability to perform ADLs (bathing, care of mouth/teeth, toileting, grooming, dressing, etc )  - Assess/evaluate cause of self-care deficits   - Assess range of motion  - Assess patient's mobility; develop plan if impaired  - Assess patient's need for assistive devices and provide as appropriate  - Encourage maximum independence but intervene and supervise when necessary  - Involve family in performance of ADLs  - Assess for home care needs following discharge   - Consider OT consult to assist with ADL evaluation and planning for discharge  - Provide patient education as appropriate  Outcome: Progressing  Goal: Maintain or return mobility status to optimal level  Description  INTERVENTIONS:  - Assess patient's baseline mobility status (ambulation, transfers, stairs, etc ) - Identify cognitive and physical deficits and behaviors that affect mobility  - Identify mobility aids required to assist with transfers and/or ambulation (gait belt, sit-to-stand, lift, walker, cane, etc )  - [place] fall precautions as indicated by assessment  - Record patient progress and toleration of activity level on Mobility SBAR; progress patient to next Phase/Stage  - Instruct patient to call for assistance with activity based on assessment  - Consider rehabilitation consult to assist with strengthening/weightbearing, etc   Outcome: Progressing     Problem: DISCHARGE PLANNING  Goal: Discharge to home or other facility with appropriate resources  Description  INTERVENTIONS:  - Identify barriers to discharge w/patient and caregiver  - Arrange for needed discharge resources and transportation as appropriate  - Identify discharge learning needs (meds, wound care, etc )  - Arrange for interpretive services to assist at discharge as needed  - Refer to Case Management Department for coordinating discharge planning if the patient needs post-hospital services based on physician/advanced practitioner order or complex needs related to functional status, cognitive ability, or social support system  Outcome: Progressing     Problem: Knowledge Deficit  Goal: Patient/family/caregiver demonstrates understanding of disease process, treatment plan, medications, and discharge instructions  Description  Complete learning assessment and assess knowledge base    Interventions:  - Provide teaching at level of understanding  - Provide teaching via preferred learning methods  Outcome: Progressing     Problem: GASTROINTESTINAL - ADULT  Goal: Minimal or absence of nausea and/or vomiting  Description  INTERVENTIONS:  - Administer IV fluids if ordered to ensure adequate hydration  - Maintain NPO status until nausea and vomiting are resolved  - Nasogastric tube if ordered  - Administer ordered antiemetic medications as needed  - Provide nonpharmacologic comfort measures as appropriate  - Advance diet as tolerated, if ordered  - Consider nutrition services referral to assist patient with adequate nutrition and appropriate food choices  Outcome: Progressing  Goal: Maintains or returns to baseline bowel function  Description  INTERVENTIONS:  - Assess bowel function  - Encourage oral fluids to ensure adequate hydration  - Administer IV fluids if ordered to ensure adequate hydration  - Administer ordered medications as needed  - Encourage mobilization and activity  - Consider nutritional services referral to assist patient with adequate nutrition and appropriate food choices  Outcome: Progressing  Goal: Maintains adequate nutritional intake  Description  INTERVENTIONS:  - Monitor percentage of each meal consumed  - Identify factors contributing to decreased intake, treat as appropriate  - Assist with meals as needed  - Monitor I&O, weight, and lab values if indicated  - Obtain nutrition services referral as needed  Outcome: Progressing     Problem: METABOLIC, FLUID AND ELECTROLYTES - ADULT  Goal: Electrolytes maintained within normal limits  Description  INTERVENTIONS:  - Monitor labs and assess patient for signs and symptoms of electrolyte imbalances  - Administer electrolyte replacement as ordered  - Monitor response to electrolyte replacements, including repeat lab results as appropriate  - Instruct patient on fluid and nutrition as appropriate  Outcome: Progressing  Goal: Fluid balance maintained  Description  INTERVENTIONS:  - Monitor labs   - Monitor I/O and WT  - Instruct patient on fluid and nutrition as appropriate  - Assess for signs & symptoms of volume excess or deficit  Outcome: Progressing     Problem: DISCHARGE PLANNING - CARE MANAGEMENT  Goal: Discharge to post-acute care or home with appropriate resources  Description  INTERVENTIONS:  - Conduct assessment to determine patient/family and health care team treatment goals, and need for post-acute services based on payer coverage, community resources, and patient preferences, and barriers to discharge  - Address psychosocial, clinical, and financial barriers to discharge as identified in assessment in conjunction with the patient/family and health care team  - Arrange appropriate level of post-acute services according to patient's   needs and preference and payer coverage in collaboration with the physician and health care team  - Communicate with and update the patient/family, physician, and health care team regarding progress on the discharge plan  - Arrange appropriate transportation to post-acute venues  Pt will return home with       Outcome: Progressing

## 2019-08-29 NOTE — ASSESSMENT & PLAN NOTE
· Severe sepsis was present on admission and secondary to gram-negative bacteremia  · The gram-negative bacteremia is secondary to Citrobacter freundii  · Continue IV cefepime  · The patient was seen in consultation by Infectious Disease  · The lactic acidosis has resolved  · Follow the procalcitonin level  · The patient will require a total of a 14-day antibiotic course per Infectious Disease's recommendations

## 2019-08-30 ENCOUNTER — ANESTHESIA (INPATIENT)
Dept: PERIOP | Facility: HOSPITAL | Age: 44
DRG: 872 | End: 2019-08-30
Payer: COMMERCIAL

## 2019-08-30 ENCOUNTER — HOSPITAL ENCOUNTER (OUTPATIENT)
Dept: PERIOP | Facility: HOSPITAL | Age: 44
Discharge: HOME | DRG: 872 | End: 2019-08-30
Payer: COMMERCIAL

## 2019-08-30 ENCOUNTER — APPOINTMENT (INPATIENT)
Dept: ULTRASOUND IMAGING | Facility: HOSPITAL | Age: 44
DRG: 872 | End: 2019-08-30
Payer: COMMERCIAL

## 2019-08-30 VITALS
DIASTOLIC BLOOD PRESSURE: 74 MMHG | RESPIRATION RATE: 13 BRPM | SYSTOLIC BLOOD PRESSURE: 111 MMHG | HEART RATE: 68 BPM | OXYGEN SATURATION: 98 % | TEMPERATURE: 97.4 F

## 2019-08-30 PROBLEM — R22.32 MASS OF LEFT AXILLA: Status: ACTIVE | Noted: 2019-08-30

## 2019-08-30 LAB
ALBUMIN SERPL BCP-MCNC: 3.2 G/DL (ref 3.5–5)
ALP SERPL-CCNC: 51 U/L (ref 46–116)
ALT SERPL W P-5'-P-CCNC: 26 U/L (ref 12–78)
ANION GAP SERPL CALCULATED.3IONS-SCNC: 6 MMOL/L (ref 4–13)
AST SERPL W P-5'-P-CCNC: 20 U/L (ref 5–45)
BILIRUB SERPL-MCNC: 0.3 MG/DL (ref 0.2–1)
BUN SERPL-MCNC: 3 MG/DL (ref 5–25)
CALCIUM SERPL-MCNC: 8.2 MG/DL (ref 8.3–10.1)
CHLORIDE SERPL-SCNC: 105 MMOL/L (ref 100–108)
CO2 SERPL-SCNC: 28 MMOL/L (ref 21–32)
CREAT SERPL-MCNC: 0.68 MG/DL (ref 0.6–1.3)
ERYTHROCYTE [DISTWIDTH] IN BLOOD BY AUTOMATED COUNT: 12.3 % (ref 11.6–15.1)
GFR SERPL CREATININE-BSD FRML MDRD: 105 ML/MIN/1.73SQ M
GLUCOSE SERPL-MCNC: 119 MG/DL (ref 65–140)
HCT VFR BLD AUTO: 41.2 % (ref 34.8–46.1)
HGB BLD-MCNC: 13.8 G/DL (ref 11.5–15.4)
MAGNESIUM SERPL-MCNC: 1.8 MG/DL (ref 1.6–2.6)
MCH RBC QN AUTO: 32.6 PG (ref 26.8–34.3)
MCHC RBC AUTO-ENTMCNC: 33.5 G/DL (ref 31.4–37.4)
MCV RBC AUTO: 97 FL (ref 82–98)
PLATELET # BLD AUTO: 378 THOUSANDS/UL (ref 149–390)
PMV BLD AUTO: 8.9 FL (ref 8.9–12.7)
POTASSIUM SERPL-SCNC: 4 MMOL/L (ref 3.5–5.3)
PROCALCITONIN SERPL-MCNC: 0.32 NG/ML
PROT SERPL-MCNC: 6.5 G/DL (ref 6.4–8.2)
RBC # BLD AUTO: 4.23 MILLION/UL (ref 3.81–5.12)
SODIUM SERPL-SCNC: 139 MMOL/L (ref 136–145)
WBC # BLD AUTO: 5.27 THOUSAND/UL (ref 4.31–10.16)

## 2019-08-30 PROCEDURE — 83735 ASSAY OF MAGNESIUM: CPT | Performed by: INTERNAL MEDICINE

## 2019-08-30 PROCEDURE — 99232 SBSQ HOSP IP/OBS MODERATE 35: CPT | Performed by: INTERNAL MEDICINE

## 2019-08-30 PROCEDURE — 80053 COMPREHEN METABOLIC PANEL: CPT | Performed by: INTERNAL MEDICINE

## 2019-08-30 PROCEDURE — 76882 US LMTD JT/FCL EVL NVASC XTR: CPT

## 2019-08-30 PROCEDURE — NC001 PR NO CHARGE: Performed by: INTERNAL MEDICINE

## 2019-08-30 PROCEDURE — 88305 TISSUE EXAM BY PATHOLOGIST: CPT | Performed by: PATHOLOGY

## 2019-08-30 PROCEDURE — 0DBK8ZX EXCISION OF ASCENDING COLON, VIA NATURAL OR ARTIFICIAL OPENING ENDOSCOPIC, DIAGNOSTIC: ICD-10-PCS | Performed by: INTERNAL MEDICINE

## 2019-08-30 PROCEDURE — 0DBH8ZX EXCISION OF CECUM, VIA NATURAL OR ARTIFICIAL OPENING ENDOSCOPIC, DIAGNOSTIC: ICD-10-PCS | Performed by: INTERNAL MEDICINE

## 2019-08-30 PROCEDURE — 45380 COLONOSCOPY AND BIOPSY: CPT | Performed by: INTERNAL MEDICINE

## 2019-08-30 PROCEDURE — 0DB58ZX EXCISION OF ESOPHAGUS, VIA NATURAL OR ARTIFICIAL OPENING ENDOSCOPIC, DIAGNOSTIC: ICD-10-PCS | Performed by: INTERNAL MEDICINE

## 2019-08-30 PROCEDURE — 84145 PROCALCITONIN (PCT): CPT | Performed by: INTERNAL MEDICINE

## 2019-08-30 PROCEDURE — 0DBM8ZX EXCISION OF DESCENDING COLON, VIA NATURAL OR ARTIFICIAL OPENING ENDOSCOPIC, DIAGNOSTIC: ICD-10-PCS | Performed by: INTERNAL MEDICINE

## 2019-08-30 PROCEDURE — 43239 EGD BIOPSY SINGLE/MULTIPLE: CPT | Performed by: INTERNAL MEDICINE

## 2019-08-30 PROCEDURE — 0DBL8ZX EXCISION OF TRANSVERSE COLON, VIA NATURAL OR ARTIFICIAL OPENING ENDOSCOPIC, DIAGNOSTIC: ICD-10-PCS | Performed by: INTERNAL MEDICINE

## 2019-08-30 PROCEDURE — 0DB98ZX EXCISION OF DUODENUM, VIA NATURAL OR ARTIFICIAL OPENING ENDOSCOPIC, DIAGNOSTIC: ICD-10-PCS | Performed by: INTERNAL MEDICINE

## 2019-08-30 PROCEDURE — 85027 COMPLETE CBC AUTOMATED: CPT | Performed by: INTERNAL MEDICINE

## 2019-08-30 RX ORDER — HYDROXYZINE HYDROCHLORIDE 25 MG/1
25 TABLET, FILM COATED ORAL ONCE
Status: COMPLETED | OUTPATIENT
Start: 2019-08-30 | End: 2019-08-30

## 2019-08-30 RX ORDER — HYDROMORPHONE HCL/PF 1 MG/ML
0.5 SYRINGE (ML) INJECTION EVERY 4 HOURS PRN
Status: DISCONTINUED | OUTPATIENT
Start: 2019-08-30 | End: 2019-09-12 | Stop reason: HOSPADM

## 2019-08-30 RX ORDER — LIDOCAINE HYDROCHLORIDE 20 MG/ML
INJECTION, SOLUTION INFILTRATION; PERINEURAL AS NEEDED
Status: DISCONTINUED | OUTPATIENT
Start: 2019-08-30 | End: 2019-08-30 | Stop reason: SURG

## 2019-08-30 RX ORDER — HYDROMORPHONE HCL/PF 1 MG/ML
0.2 SYRINGE (ML) INJECTION EVERY 4 HOURS PRN
Status: DISCONTINUED | OUTPATIENT
Start: 2019-08-30 | End: 2019-09-12 | Stop reason: HOSPADM

## 2019-08-30 RX ORDER — PROPOFOL 10 MG/ML
INJECTION, EMULSION INTRAVENOUS AS NEEDED
Status: DISCONTINUED | OUTPATIENT
Start: 2019-08-30 | End: 2019-08-30 | Stop reason: SURG

## 2019-08-30 RX ORDER — PROPOFOL 10 MG/ML
INJECTION, EMULSION INTRAVENOUS CONTINUOUS PRN
Status: DISCONTINUED | OUTPATIENT
Start: 2019-08-30 | End: 2019-08-30 | Stop reason: SURG

## 2019-08-30 RX ORDER — SODIUM CHLORIDE, SODIUM LACTATE, POTASSIUM CHLORIDE, CALCIUM CHLORIDE 600; 310; 30; 20 MG/100ML; MG/100ML; MG/100ML; MG/100ML
125 INJECTION, SOLUTION INTRAVENOUS CONTINUOUS
Status: CANCELLED | OUTPATIENT
Start: 2019-08-30

## 2019-08-30 RX ORDER — SODIUM CHLORIDE 9 MG/ML
125 INJECTION, SOLUTION INTRAVENOUS CONTINUOUS
Status: DISCONTINUED | OUTPATIENT
Start: 2019-08-30 | End: 2019-09-03 | Stop reason: HOSPADM

## 2019-08-30 RX ADMIN — DIPHENHYDRAMINE HYDROCHLORIDE 50 MG: 50 INJECTION, SOLUTION INTRAMUSCULAR; INTRAVENOUS at 22:27

## 2019-08-30 RX ADMIN — HYDROMORPHONE HYDROCHLORIDE 0.5 MG: 1 INJECTION, SOLUTION INTRAMUSCULAR; INTRAVENOUS; SUBCUTANEOUS at 14:16

## 2019-08-30 RX ADMIN — PANTOPRAZOLE SODIUM 40 MG: 40 TABLET, DELAYED RELEASE ORAL at 13:51

## 2019-08-30 RX ADMIN — HYDROMORPHONE HYDROCHLORIDE 0.5 MG: 1 INJECTION, SOLUTION INTRAMUSCULAR; INTRAVENOUS; SUBCUTANEOUS at 06:06

## 2019-08-30 RX ADMIN — CETIRIZINE HYDROCHLORIDE 20 MG: 10 TABLET ORAL at 13:52

## 2019-08-30 RX ADMIN — TEMAZEPAM 30 MG: 15 CAPSULE ORAL at 00:30

## 2019-08-30 RX ADMIN — DIPHENHYDRAMINE HYDROCHLORIDE 50 MG: 50 INJECTION INTRAMUSCULAR; INTRAVENOUS at 10:07

## 2019-08-30 RX ADMIN — Medication 250 MG: at 13:51

## 2019-08-30 RX ADMIN — PANTOPRAZOLE SODIUM 40 MG: 40 TABLET, DELAYED RELEASE ORAL at 16:34

## 2019-08-30 RX ADMIN — HYDROMORPHONE HYDROCHLORIDE 0.5 MG: 1 INJECTION, SOLUTION INTRAMUSCULAR; INTRAVENOUS; SUBCUTANEOUS at 18:24

## 2019-08-30 RX ADMIN — PROPOFOL 60 MG: 10 INJECTION, EMULSION INTRAVENOUS at 11:57

## 2019-08-30 RX ADMIN — DIPHENHYDRAMINE HYDROCHLORIDE 50 MG: 50 INJECTION, SOLUTION INTRAMUSCULAR; INTRAVENOUS at 18:24

## 2019-08-30 RX ADMIN — PREDNISONE 50 MG: 20 TABLET ORAL at 10:08

## 2019-08-30 RX ADMIN — SODIUM CHLORIDE 150 ML/HR: 0.9 INJECTION, SOLUTION INTRAVENOUS at 15:08

## 2019-08-30 RX ADMIN — MONTELUKAST 10 MG: 10 TABLET, FILM COATED ORAL at 10:07

## 2019-08-30 RX ADMIN — PROPOFOL 150 MCG/KG/MIN: 10 INJECTION, EMULSION INTRAVENOUS at 11:58

## 2019-08-30 RX ADMIN — HYDROMORPHONE HYDROCHLORIDE 0.5 MG: 1 INJECTION, SOLUTION INTRAMUSCULAR; INTRAVENOUS; SUBCUTANEOUS at 22:27

## 2019-08-30 RX ADMIN — RANITIDINE 300 MG: 150 TABLET ORAL at 16:35

## 2019-08-30 RX ADMIN — PROPOFOL 30 MG: 10 INJECTION, EMULSION INTRAVENOUS at 11:49

## 2019-08-30 RX ADMIN — LORAZEPAM 1 MG: 1 TABLET ORAL at 20:11

## 2019-08-30 RX ADMIN — HYDROXYZINE HYDROCHLORIDE 25 MG: 25 TABLET ORAL at 19:27

## 2019-08-30 RX ADMIN — TEMAZEPAM 30 MG: 15 CAPSULE ORAL at 22:27

## 2019-08-30 RX ADMIN — CEFEPIME HYDROCHLORIDE 2000 MG: 2 INJECTION, POWDER, FOR SOLUTION INTRAVENOUS at 13:57

## 2019-08-30 RX ADMIN — RANITIDINE 150 MG: 150 TABLET ORAL at 10:08

## 2019-08-30 RX ADMIN — HYDROMORPHONE HYDROCHLORIDE 0.5 MG: 1 INJECTION, SOLUTION INTRAMUSCULAR; INTRAVENOUS; SUBCUTANEOUS at 10:07

## 2019-08-30 RX ADMIN — FLUCONAZOLE 200 MG: 40 POWDER, FOR SUSPENSION ORAL at 13:51

## 2019-08-30 RX ADMIN — PROPOFOL 30 MG: 10 INJECTION, EMULSION INTRAVENOUS at 11:52

## 2019-08-30 RX ADMIN — DIPHENHYDRAMINE HYDROCHLORIDE 50 MG: 50 INJECTION, SOLUTION INTRAMUSCULAR; INTRAVENOUS at 06:10

## 2019-08-30 RX ADMIN — PROPOFOL 50 MG: 10 INJECTION, EMULSION INTRAVENOUS at 12:03

## 2019-08-30 RX ADMIN — Medication 250 MG: at 17:19

## 2019-08-30 RX ADMIN — BUTALBITAL, ACETAMINOPHEN AND CAFFEINE 1 TABLET: 50; 325; 40 TABLET ORAL at 06:06

## 2019-08-30 RX ADMIN — PROPOFOL 100 MG: 10 INJECTION, EMULSION INTRAVENOUS at 11:46

## 2019-08-30 RX ADMIN — MONTELUKAST 10 MG: 10 TABLET, FILM COATED ORAL at 17:19

## 2019-08-30 RX ADMIN — CETIRIZINE HYDROCHLORIDE 20 MG: 10 TABLET ORAL at 17:19

## 2019-08-30 RX ADMIN — DIPHENHYDRAMINE HYDROCHLORIDE 50 MG: 50 INJECTION, SOLUTION INTRAMUSCULAR; INTRAVENOUS at 13:57

## 2019-08-30 RX ADMIN — ENOXAPARIN SODIUM 40 MG: 40 INJECTION SUBCUTANEOUS at 13:51

## 2019-08-30 RX ADMIN — LIDOCAINE HYDROCHLORIDE 5 ML: 20 INJECTION, SOLUTION INFILTRATION; PERINEURAL at 11:46

## 2019-08-30 RX ADMIN — SODIUM CHLORIDE 150 ML/HR: 0.9 INJECTION, SOLUTION INTRAVENOUS at 22:28

## 2019-08-30 NOTE — NURSING NOTE
Pt unable to complete all of golytely bowel prep due to nausea and abdominal pain  Drank all but 500 mL of prep  Pt also unable to do CHG bath due to allergy to CHG  Pt washed with soap and water

## 2019-08-30 NOTE — ASSESSMENT & PLAN NOTE
· Severe sepsis was present on admission and secondary to gram-negative bacteremia  · Continue IV cefepime  · The patient was seen in consultation by Infectious Disease  · The lactic acidosis has resolved  · Follow the procalcitonin level  · The patient will require a total of a 14-day antibiotic course per Infectious Disease's recommendations  · The patient will be transitioned to levofloxacin 750 mg PO every 24 hours on 08/31/2019

## 2019-08-30 NOTE — ANESTHESIA POSTPROCEDURE EVALUATION
Post-Op Assessment Note    CV Status:  Stable  Pain Score: 0       Mental Status:  Awake and agitated   Hydration Status:  Stable   PONV Controlled:  Controlled   Airway Patency:  Patent   Post Op Vitals Reviewed: Yes      Staff: Anesthesiologist, CRNA           BP      Temp     Pulse     Resp      SpO2

## 2019-08-30 NOTE — UTILIZATION REVIEW
Continued Stay Review    Date: 8/30/19                     Current Patient Class: Inpatient Current Level of Care: Med Surg    HPI:44 y o  female with history of ulcerative colitis on chronic Remicade,  initially admitted on 8/26/19 for treatment of severe sepsis secondary to gram-negative bacteremia  Patient was seen on Consult by Infectious Disease, will require 14 day antibiotic course, remains on IV cefepime  Will be transitioned to PO levofloxacin on 8/31  Patient was also evaluated by GastroenterologyI for BRB per rectum  Patient underwent EGD and colonoscopy today  Remains on IV fluids  Internal Medicine notes patient is at high risk for DVT/venous thromboembolism in the setting of chronic ulcerative colitis, placed on Lovenox SQ  Patient continues to experience generalized abdominal pain  8/30 Colonoscopy: Moderately severe proctosigmoiditis -2 linear ulcers noted in the mid rectum  Biopsies were taken to rule out CMV  Random biopsies were taken from ascending, transverse, descending and sigmoid colon  Recommendation: Await pathology, continue current treatment  8/30 EGD: Mild mucosal ringing in proximal and mid esophagus  Normal stomach and duodenum  Random biopsy taken from proximal and distal esophagus  Random biopsy taken from duodenum  Recommendation: follow up biopsy         Pertinent Labs/Diagnostic Results:   Results from last 7 days   Lab Units 08/30/19  0612 08/29/19  0623 08/28/19  0617 08/27/19  0512 08/26/19  2336 08/26/19  1544   WBC Thousand/uL 5 27 5 25 6 07 16 72*  --  17 33*   HEMOGLOBIN g/dL 13 8 14 0 13 1 12 6 13 1 14 3   HEMATOCRIT % 41 2 42 7 40 0 37 5 39 2 41 2   PLATELETS Thousands/uL 378 369 333 335 337 352   NEUTROS ABS Thousands/µL  --  2 18 2 58  --   --  15 47*         Results from last 7 days   Lab Units 08/30/19  0612 08/29/19  0623 08/28/19  0617 08/27/19  0512 08/26/19  1544   SODIUM mmol/L 139 140 138 142 137   POTASSIUM mmol/L 4 0 4 1 4 4 3 3* 3 7   CHLORIDE mmol/L 105 105 107 107 102   CO2 mmol/L 28 28 25 26 22   ANION GAP mmol/L 6 7 6 9 13   BUN mg/dL 3* 4* 4* 7 18   CREATININE mg/dL 0 68 0 84 0 73 0 62 1 06   EGFR ml/min/1 73sq m 105 84 99 108 63   CALCIUM mg/dL 8 2* 8 4 8 3 7 8* 9 3   CALCIUM, IONIZED mmol/L  --   --  1 17  --   --    MAGNESIUM mg/dL 1 8 2 0 2 1 1 9  --    PHOSPHORUS mg/dL  --  3 2 3 0 2 4*  --      Results from last 7 days   Lab Units 08/30/19  0612 08/29/19  0623 08/28/19  0617 08/26/19  1544   AST U/L 20 21 22 33   ALT U/L 26 22 23 25   ALK PHOS U/L 51 46 43* 56   TOTAL PROTEIN g/dL 6 5 6 7 6 7 7 6   ALBUMIN g/dL 3 2* 3 2* 3 3* 3 9   TOTAL BILIRUBIN mg/dL 0 30 0 30 0 30 0 40         Results from last 7 days   Lab Units 08/30/19  0612 08/29/19  0623 08/28/19  0617 08/27/19  0512 08/26/19  1544   GLUCOSE RANDOM mg/dL 119 83 84 78 94       Results from last 7 days   Lab Units 08/26/19  1544   PROTIME seconds 12 2   INR  0 91   PTT seconds 24         Results from last 7 days   Lab Units 08/30/19  0612 08/29/19  0623 08/28/19  0617 08/27/19  0512 08/27/19  0037   PROCALCITONIN ng/ml 0 32* 1 05* 2 11* 4 46* 4 29*     Results from last 7 days   Lab Units 08/26/19  1750 08/26/19  1544   LACTIC ACID mmol/L 1 6 2 3*           Results from last 7 days   Lab Units 08/26/19  1544   LIPASE u/L 117       Results from last 7 days   Lab Units 08/26/19  1650   CLARITY UA  Clear   COLOR UA  Yellow   SPEC GRAV UA  1 020   PH UA  6 0   GLUCOSE UA mg/dl Negative   KETONES UA mg/dl Negative   BLOOD UA  Trace-Intact*   PROTEIN UA mg/dl Negative   NITRITE UA  Negative   BILIRUBIN UA  Negative   UROBILINOGEN UA E U /dl 0 2   LEUKOCYTES UA  Negative   WBC UA /hpf 0-1*   RBC UA /hpf 1-2*   BACTERIA UA /hpf None Seen   EPITHELIAL CELLS WET PREP /hpf Occasional     Results from last 7 days   Lab Units 08/27/19  1848   C DIFF TOXIN B  NEGATIVE for C difficle toxin by PCR        Results from last 7 days   Lab Units 08/28/19  1515   SALMONELLA SP PCR  None Detected   SHIGELLA SP/ENTEROINVASIVE E  COLI (EIEC)  None Detected   CAMPYLOBACTER SP (JEJUNI AND COLI)  None Detected   SHIGA TOXIN 1/SHIGA TOXIN 2  None Detected         Results from last 7 days   Lab Units 08/29/19  0623 08/29/19  0615 08/27/19  1644 08/26/19  1608 08/26/19  1544   BLOOD CULTURE  No Growth at 24 hrs  No Growth at 24 hrs   --  Citrobacter freundii* No Growth at 72 hrs     GRAM STAIN RESULT   --   --   --  Gram negative rods*  --    URINE CULTURE   --   --  No Growth <1000 cfu/mL  --   --        Vital Signs:     Date and Time Temp Pulse SpO2 Resp BP   08/30/19 1416 -- -- -- -- --   08/30/19 1341 98 9 °F (37 2 °C) 65 98 % 16 114/74   08/30/19 1305 -- 68 98 % 13 111/74   08/30/19 1300 97 4 °F (36 3 °C) 60 100 % 13 116/79   08/30/19 1250 -- 61 100 % 15 119/78   08/30/19 1245 -- 62 100 % 14 119/80   08/30/19 1240 -- 67 100 % 15 116/77   08/30/19 1238 97 5 °F (36 4 °C) 74 100 % 14 116/73   08/30/19 1007 -- -- -- -- --   08/30/19 0833 -- -- -- -- --   08/30/19 0748 97 1 °F (36 2 °C) 85 97 % 16 113/56       Medications:   Scheduled Meds:   Current Facility-Administered Medications:  acetaminophen 650 mg Oral Q6H PRN   butalbital-acetaminophen-caffeine 1 tablet Oral Q8H PRN   cetirizine 20 mg Oral BID   diphenhydrAMINE 50 mg Intravenous Q4H PRN   enoxaparin 40 mg Subcutaneous Q24H   fluconazole 200 mg Oral Daily   HYDROmorphone 0 2 mg Intravenous Q4H PRN   Or      HYDROmorphone 0 5 mg Intravenous Q4H PRN x 5 doses 8/29, 3 doses 8/30   levothyroxine 50 mcg Oral Early Morning   LORazepam 1 mg Oral Q8H PRN   montelukast 10 mg Oral BID   ondansetron 4 mg Intravenous Q6H PRN   pantoprazole 40 mg Oral BID AC   predniSONE 50 mg Oral Daily   ranitidine 300 mg Oral Q12H [address]   saccharomyces boulardii 250 mg Oral BID   sodium chloride 150 mL/hr Intravenous Continuous   temazepam 30 mg Oral HS       cefepime (MAXIPIME) 2,000 mg in dextrose 5 % 50 mL IVPB   Dose: 2,000 mg  Freq: Every 12 hours Route: IV  Last Dose: 2,000 mg (08/30/19 1357)  Start: 08/27/19 1200    sodium chloride 0 9 % infusion   Rate: 150 mL/hr Dose: 150 mL/hr  Freq: Continuous Route: IV  Indications of Use: IV Hydration  Last Dose: 150 mL/hr (08/30/19 1345)  Start: 08/28/19 1330      Discharge Plan: D    Network Utilization Review Department  Phone: [phone]; Fax [phone]  [email or web address]  ATTENTION: Please call with any questions or concerns to [phone]  and carefully listen to the prompts so that you are directed to the right person  Send all requests for admission clinical reviews, approved or denied determinations and any other requests to fax [phone]   All voicemails are confidential

## 2019-08-30 NOTE — ASSESSMENT & PLAN NOTE
· On chronic Remicade treatment  · Currently experiencing bloody diarrhea  · Check stool cultures including a Clostridium difficile culture, a Rotavirus stool antigen test, a stool culture for enteric bacterial pathogens, and stool for fecal leukocytes    · The patient was seen in consultation by Gastroenterology  · The patient will undergo a colonoscopy today  · The patient is at high risk for DVT/venous thromboembolism in the setting of chronic ulcerative colitis, so she will be placed on lovenox 40 mg SQ every 24 hours DVT prophylaxis even in the setting of rectal bleeding

## 2019-08-30 NOTE — INTERVAL H&P NOTE
H&P reviewed  After examining the patient I find no changes in the patients condition since the H&P had been written      Given her history of dysphagia and EOE with Candida esophagitis will do EGD for further evaluation as well

## 2019-08-30 NOTE — PROGRESS NOTES
Progress Note - Eliz Salas 1975, 53 y o  female MRN: [id number]    Unit/Bed#:  Encounter: [id number]    Primary Care Provider: Jazmin Begum PA-C   Date and time admitted to hospital: 8/26/2019  3:12 PM        * Severe sepsis [facility])  Assessment & Plan  · Severe sepsis was present on admission and secondary to gram-negative bacteremia  · Continue IV cefepime  · The patient was seen in consultation by Infectious Disease  · The lactic acidosis has resolved  · Follow the procalcitonin level  · The patient will require a total of a 14-day antibiotic course per Infectious Disease's recommendations  · The patient will be transitioned to levofloxacin 750 mg PO every 24 hours on 08/31/2019      Bacteremia due to Gram-negative bacteria  Assessment & Plan  · Severe sepsis was present on admission and secondary to gram-negative bacteremia  · Continue IV cefepime  · The patient was seen in consultation by Infectious Disease  · The lactic acidosis has resolved  · Follow the procalcitonin level  · The patient will require a total of a 14-day antibiotic course per Infectious Disease's recommendations  · The patient will be transitioned to levofloxacin 750 mg PO every 24 hours on 08/31/2019        Ulcerative colitis ([facility] [address] )  Assessment & Plan  · On chronic Remicade treatment  · Currently experiencing bloody diarrhea  · Check stool cultures including a Clostridium difficile culture, a Rotavirus stool antigen test, a stool culture for enteric bacterial pathogens, and stool for fecal leukocytes    · The patient was seen in consultation by Gastroenterology  · The patient will undergo a colonoscopy today  · The patient is at high risk for DVT/venous thromboembolism in the setting of chronic ulcerative colitis, so she will be placed on lovenox 40 mg SQ every 24 hours DVT prophylaxis even in the setting of rectal bleeding    Mass of left axilla  Assessment & Plan  · Check an ultrasound    Immunosuppressed status [facility])  Assessment & Plan  · The patient is currently receiving IVIG treatment  · Also on chronic Remicade treatment    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[address] )  Assessment & Plan  · The patient is followed at [facility]  · Continue her medication regimen per [facility]    Hypophosphatemia  Assessment & Plan  · Resolved with IV phosphorus supplementation  · Follow the phosphorus level    Hypokalemia  Assessment & Plan  · Resolved with IV potassium chloride supplementation  · Follow the potassium level and magnesium level    Vitamin D deficiency  Assessment & Plan  Outpatient follow-up with PCP in regards to this matter      Lactic acidosis  Assessment & Plan  · Secondary to severe sepsis  · Resolved        VTE Pharmacologic Prophylaxis:   Pharmacologic: Enoxaparin (Lovenox)  Mechanical VTE Prophylaxis in Place: Yes    Patient Centered Rounds: I have performed bedside rounds with nursing staff today  Time Spent for Care: 20 minutes  More than 50% of total time spent on counseling and coordination of care as described above  Current Length of Stay: 4 day(s)    Current Patient Status: Inpatient   Certification Statement: The patient will continue to require additional inpatient hospital stay due to the need for IV antibiotics and the need for an EGD and colonoscopy to be completed today  Code Status: Level 1 - Full Code      Subjective: The patient was seen and examined  The patient is doing better  The patient continues to experience generalized abdominal pain  No chest pain  No shortness of breath  No nausea or vomiting  Objective:     Vitals:   Temp (24hrs), Av 6 °F (36 4 °C), Min:97 1 °F (36 2 °C), Max:98 2 °F (36 8 °C)    Temp:  [97 1 °F (36 2 °C)-98 2 °F (36 8 °C)] 97 4 °F (36 3 °C)  HR:  [60-85] 68  Resp:  [13-16] 13  BP: (111-119)/(56-80) 111/74  SpO2:  [97 %-100 %] 98 %  Body mass index is 22 07 kg/m²  Input and Output Summary (last 24 hours):        Intake/Output Summary (Last 24 hours) at 8/30/2019 1325  Last data filed at 8/30/2019 1228  Gross per 24 hour   Intake 1808 33 ml   Output 1400 ml   Net 408 33 ml       Physical Exam:     Physical Exam  General:  NAD, awake, alert, follows commands  HEENT:  NC/AT, mucous membranes moist  Neck:  Supple, No JVP elevation  CV:  + S1, + S2, RRR  Pulm:  Lung fields are CTA bilaterally  Abd:  Soft, Generalized tenderness with palpation, Non-distended  Ext:  No clubbing/cyanosis/edema  Skin:  No rashes    Additional Data:    Labs:    Results from last 7 days   Lab Units 08/30/19  0612 08/29/19  0623   WBC Thousand/uL 5 27 5 25   HEMOGLOBIN g/dL 13 8 14 0   HEMATOCRIT % 41 2 42 7   PLATELETS Thousands/uL 378 369   NEUTROS PCT %  --  41*   LYMPHS PCT %  --  37   MONOS PCT %  --  13*   EOS PCT %  --  7*     Results from last 7 days   Lab Units 08/30/19  0612   SODIUM mmol/L 139   POTASSIUM mmol/L 4 0   CHLORIDE mmol/L 105   CO2 mmol/L 28   BUN mg/dL 3*   CREATININE mg/dL 0 68   ANION GAP mmol/L 6   CALCIUM mg/dL 8 2*   ALBUMIN g/dL 3 2*   TOTAL BILIRUBIN mg/dL 0 30   ALK PHOS U/L 51   ALT U/L 26   AST U/L 20   GLUCOSE RANDOM mg/dL 119     Results from last 7 days   Lab Units 08/26/19  1544   INR  0 91             Results from last 7 days   Lab Units 08/30/19  0612 08/29/19  0623 08/28/19  0617 08/27/19  0512 08/27/19  0037 08/26/19  1750 08/26/19  1544   LACTIC ACID mmol/L  --   --   --   --   --  1 6 2 3*   PROCALCITONIN ng/ml 0 32* 1 05* 2 11* 4 46* 4 29*  --  0 06           * I Have Reviewed All Lab Data Listed Above  * Additional Pertinent Lab Tests Reviewed: [address] Admission Reviewed      Recent Cultures (last 7 days):     Results from last 7 days   Lab Units 08/29/19  0623 08/29/19  0615 08/27/19  1848 08/27/19  1644 08/26/19  1608 08/26/19  1544   BLOOD CULTURE  No Growth at 24 hrs  No Growth at 24 hrs   --   --  Citrobacter freundii* No Growth at 72 hrs     GRAM STAIN RESULT   --   --   --   --  Gram negative rods*  --    URINE CULTURE   --   --   --  No Growth <1000 cfu/mL  --   --    C DIFF TOXIN B   --   --  NEGATIVE for C difficle toxin by PCR    --   --   --        Last 24 Hours Medication List:     Current Facility-Administered Medications:  acetaminophen 650 mg Oral Q6H PRN Arleene Moy, DO    butalbital-acetaminophen-caffeine 1 tablet Oral Q8H PRN Arleene Moy, DO    cefepime 2,000 mg Intravenous Q12H Arleene Moy, DO Last Rate: Stopped (08/30/19 0016)   cetirizine 20 mg Oral BID Arleene Moy, DO    diphenhydrAMINE 50 mg Intravenous Q4H PRN Arleene Moy, DO    enoxaparin 40 mg Subcutaneous Q24H Arleene Moy, DO    fluconazole 200 mg Oral Daily Tejal Bardales PA-C    HYDROmorphone 0 5 mg Intravenous Q4H PRN Arleene Moy, DO    levothyroxine 50 mcg Oral Early Morning Arleene Moy, DO    LORazepam 1 mg Oral Q8H PRN Arleene Moy, DO    montelukast 10 mg Oral BID Arleene Moy, DO    morphine injection 1 mg Intravenous Q4H PRN Arleene Moy, DO    ondansetron 4 mg Intravenous Q6H PRN Arleene Moy, DO    pantoprazole 40 mg Oral BID AC Arleene Moy, DO    predniSONE 50 mg Oral Daily Tejal Bardales PA-C    ranitidine 300 mg Oral Q12H [address] Felipe Tripp,     saccharomyces boulardii 250 mg Oral BID Arleene Moy, DO    sodium chloride 150 mL/hr Intravenous Continuous Arleene Moy, DO Last Rate: Stopped (08/30/19 1228)   temazepam 30 mg Oral HS Felipe Tripp,       Facility-Administered Medications Ordered in Other Encounters:  sodium chloride 125 mL/hr Intravenous Continuous Eunice Sampson CRNA        Today, Patient Was Seen By: Sarahi Winter DO    ** Please Note: Dictation voice to text software may have been used in the creation of this document   **

## 2019-08-30 NOTE — ANESTHESIA PREPROCEDURE EVALUATION
Review of Systems/Medical History  Patient summary reviewed    No history of anesthetic complications     Cardiovascular  Exercise tolerance (METS): >4,  Past MI ,   Comment: SUMMARY     LEFT VENTRICLE:  Size was normal   Systolic function was normal  Ejection fraction was estimated to be 60 %  There were no regional wall motion abnormalities  Wall thickness was normal   There was no evidence of concentric hypertrophy,  Pulmonary  Asthma ,        GI/Hepatic            Endo/Other  History of thyroid disease , hypothyroidism,      GYN       Hematology   Musculoskeletal       Neurology    Headaches,    Psychology           Physical Exam    Airway    Mallampati score: I  TM Distance: >3 FB  Neck ROM: full     Dental   No notable dental hx     Cardiovascular  Rhythm: regular, Rate: normal,     Pulmonary  Breath sounds clear to auscultation,     Other Findings        Anesthesia Plan  ASA Score- 3     Anesthesia Type- IV sedation with anesthesia with ASA Monitors  Additional Monitors:   Airway Plan:         Plan Factors-  Patient did not smoke on day of surgery  Induction- intravenous  Postoperative Plan-     Informed Consent- Anesthetic plan and risks discussed with patient  I personally reviewed this patient with the CRNA  Discussed and agreed on the Anesthesia Plan with the CRNA  Kiko Lan

## 2019-08-30 NOTE — MALNUTRITION/BMI
This medical record reflects one or more clinical indicators suggestive of malnutrition and/or morbid obesity  Malnutrition Findings:   Malnutrition type: Acute illness  Degree of Malnutrition: Malnutrition of mild degree  Malnutrition Characteristics: Inadequate energy, Weight loss    Malnutrition related to inability to tolerate PO intake as evidenced by 7#/5% wt loss in <1 week, <50% energy needs met >3 days  Advance diet when medically stable  Patient cannot have any supplements on formulary due to allergies  BMI Findings: Body mass index is 22 07 kg/m²  See Nutrition note dated 08/30/2019 for additional details  Completed nutrition assessment is viewable in the nutrition documentation

## 2019-08-31 LAB
ALBUMIN SERPL BCP-MCNC: 2.8 G/DL (ref 3.5–5)
ALP SERPL-CCNC: 44 U/L (ref 46–116)
ALT SERPL W P-5'-P-CCNC: 17 U/L (ref 12–78)
ANION GAP SERPL CALCULATED.3IONS-SCNC: 8 MMOL/L (ref 4–13)
AST SERPL W P-5'-P-CCNC: 10 U/L (ref 5–45)
BACTERIA BLD CULT: NORMAL
BASOPHILS # BLD AUTO: 0.06 THOUSANDS/ΜL (ref 0–0.1)
BASOPHILS NFR BLD AUTO: 1 % (ref 0–1)
BILIRUB SERPL-MCNC: 0.2 MG/DL (ref 0.2–1)
BUN SERPL-MCNC: 4 MG/DL (ref 5–25)
CALCIUM SERPL-MCNC: 8 MG/DL (ref 8.3–10.1)
CALPROTECTIN STL-MCNT: 36 UG/G (ref 0–120)
CHLORIDE SERPL-SCNC: 108 MMOL/L (ref 100–108)
CO2 SERPL-SCNC: 26 MMOL/L (ref 21–32)
CREAT SERPL-MCNC: 0.69 MG/DL (ref 0.6–1.3)
EOSINOPHIL # BLD AUTO: 0.06 THOUSAND/ΜL (ref 0–0.61)
EOSINOPHIL NFR BLD AUTO: 1 % (ref 0–6)
ERYTHROCYTE [DISTWIDTH] IN BLOOD BY AUTOMATED COUNT: 12.6 % (ref 11.6–15.1)
GFR SERPL CREATININE-BSD FRML MDRD: 105 ML/MIN/1.73SQ M
GLUCOSE SERPL-MCNC: 109 MG/DL (ref 65–140)
HCT VFR BLD AUTO: 37.6 % (ref 34.8–46.1)
HGB BLD-MCNC: 12.3 G/DL (ref 11.5–15.4)
IMM GRANULOCYTES # BLD AUTO: 0.02 THOUSAND/UL (ref 0–0.2)
IMM GRANULOCYTES NFR BLD AUTO: 0 % (ref 0–2)
LYMPHOCYTES # BLD AUTO: 2.69 THOUSANDS/ΜL (ref 0.6–4.47)
LYMPHOCYTES NFR BLD AUTO: 29 % (ref 14–44)
MAGNESIUM SERPL-MCNC: 1.9 MG/DL (ref 1.6–2.6)
MCH RBC QN AUTO: 32.5 PG (ref 26.8–34.3)
MCHC RBC AUTO-ENTMCNC: 32.7 G/DL (ref 31.4–37.4)
MCV RBC AUTO: 99 FL (ref 82–98)
MONOCYTES # BLD AUTO: 0.66 THOUSAND/ΜL (ref 0.17–1.22)
MONOCYTES NFR BLD AUTO: 7 % (ref 4–12)
NEUTROPHILS # BLD AUTO: 5.9 THOUSANDS/ΜL (ref 1.85–7.62)
NEUTS SEG NFR BLD AUTO: 62 % (ref 43–75)
NRBC BLD AUTO-RTO: 0 /100 WBCS
PHOSPHATE SERPL-MCNC: 1.9 MG/DL (ref 2.7–4.5)
PLATELET # BLD AUTO: 367 THOUSANDS/UL (ref 149–390)
PMV BLD AUTO: 9.7 FL (ref 8.9–12.7)
POTASSIUM SERPL-SCNC: 3.7 MMOL/L (ref 3.5–5.3)
PROCALCITONIN SERPL-MCNC: 0.18 NG/ML
PROT SERPL-MCNC: 5.9 G/DL (ref 6.4–8.2)
RBC # BLD AUTO: 3.79 MILLION/UL (ref 3.81–5.12)
SODIUM SERPL-SCNC: 142 MMOL/L (ref 136–145)
WBC # BLD AUTO: 9.39 THOUSAND/UL (ref 4.31–10.16)

## 2019-08-31 PROCEDURE — 84145 PROCALCITONIN (PCT): CPT | Performed by: INTERNAL MEDICINE

## 2019-08-31 PROCEDURE — 84100 ASSAY OF PHOSPHORUS: CPT | Performed by: INTERNAL MEDICINE

## 2019-08-31 PROCEDURE — 85025 COMPLETE CBC W/AUTO DIFF WBC: CPT | Performed by: INTERNAL MEDICINE

## 2019-08-31 PROCEDURE — 99232 SBSQ HOSP IP/OBS MODERATE 35: CPT | Performed by: INTERNAL MEDICINE

## 2019-08-31 PROCEDURE — 83735 ASSAY OF MAGNESIUM: CPT | Performed by: INTERNAL MEDICINE

## 2019-08-31 PROCEDURE — 80053 COMPREHEN METABOLIC PANEL: CPT | Performed by: INTERNAL MEDICINE

## 2019-08-31 RX ORDER — LEVOFLOXACIN 750 MG/1
750 TABLET ORAL EVERY 24 HOURS
Status: DISCONTINUED | OUTPATIENT
Start: 2019-08-31 | End: 2019-09-10

## 2019-08-31 RX ADMIN — BUTALBITAL, ACETAMINOPHEN AND CAFFEINE 1 TABLET: 50; 325; 40 TABLET ORAL at 15:11

## 2019-08-31 RX ADMIN — FLUCONAZOLE 200 MG: 40 POWDER, FOR SUSPENSION ORAL at 09:51

## 2019-08-31 RX ADMIN — ONDANSETRON 4 MG: 2 INJECTION INTRAMUSCULAR; INTRAVENOUS at 03:52

## 2019-08-31 RX ADMIN — DIPHENHYDRAMINE HYDROCHLORIDE 50 MG: 50 INJECTION, SOLUTION INTRAMUSCULAR; INTRAVENOUS at 18:01

## 2019-08-31 RX ADMIN — MONTELUKAST 10 MG: 10 TABLET, FILM COATED ORAL at 17:42

## 2019-08-31 RX ADMIN — CETIRIZINE HYDROCHLORIDE 20 MG: 10 TABLET ORAL at 17:42

## 2019-08-31 RX ADMIN — SODIUM CHLORIDE 150 ML/HR: 0.9 INJECTION, SOLUTION INTRAVENOUS at 04:55

## 2019-08-31 RX ADMIN — HYDROMORPHONE HYDROCHLORIDE 0.5 MG: 1 INJECTION, SOLUTION INTRAMUSCULAR; INTRAVENOUS; SUBCUTANEOUS at 22:22

## 2019-08-31 RX ADMIN — PANTOPRAZOLE SODIUM 40 MG: 40 TABLET, DELAYED RELEASE ORAL at 12:21

## 2019-08-31 RX ADMIN — HYDROMORPHONE HYDROCHLORIDE 0.5 MG: 1 INJECTION, SOLUTION INTRAMUSCULAR; INTRAVENOUS; SUBCUTANEOUS at 17:54

## 2019-08-31 RX ADMIN — RANITIDINE 300 MG: 150 TABLET ORAL at 12:23

## 2019-08-31 RX ADMIN — DIPHENHYDRAMINE HYDROCHLORIDE 50 MG: 50 INJECTION, SOLUTION INTRAMUSCULAR; INTRAVENOUS at 22:26

## 2019-08-31 RX ADMIN — DIPHENHYDRAMINE HYDROCHLORIDE 50 MG: 50 INJECTION, SOLUTION INTRAMUSCULAR; INTRAVENOUS at 13:48

## 2019-08-31 RX ADMIN — Medication 250 MG: at 09:45

## 2019-08-31 RX ADMIN — CEFEPIME HYDROCHLORIDE 2000 MG: 2 INJECTION, POWDER, FOR SOLUTION INTRAVENOUS at 00:00

## 2019-08-31 RX ADMIN — DIPHENHYDRAMINE HYDROCHLORIDE 50 MG: 50 INJECTION, SOLUTION INTRAMUSCULAR; INTRAVENOUS at 03:52

## 2019-08-31 RX ADMIN — LORAZEPAM 1 MG: 1 TABLET ORAL at 11:22

## 2019-08-31 RX ADMIN — HYDROMORPHONE HYDROCHLORIDE 0.5 MG: 1 INJECTION, SOLUTION INTRAMUSCULAR; INTRAVENOUS; SUBCUTANEOUS at 03:52

## 2019-08-31 RX ADMIN — PANTOPRAZOLE SODIUM 40 MG: 40 TABLET, DELAYED RELEASE ORAL at 16:32

## 2019-08-31 RX ADMIN — Medication 250 MG: at 17:42

## 2019-08-31 RX ADMIN — HYDROMORPHONE HYDROCHLORIDE 0.5 MG: 1 INJECTION, SOLUTION INTRAMUSCULAR; INTRAVENOUS; SUBCUTANEOUS at 09:45

## 2019-08-31 RX ADMIN — HYDROMORPHONE HYDROCHLORIDE 0.5 MG: 1 INJECTION, SOLUTION INTRAMUSCULAR; INTRAVENOUS; SUBCUTANEOUS at 13:49

## 2019-08-31 RX ADMIN — RANITIDINE 300 MG: 150 TABLET ORAL at 16:32

## 2019-08-31 RX ADMIN — LEVOFLOXACIN 750 MG: 750 TABLET, FILM COATED ORAL at 09:46

## 2019-08-31 RX ADMIN — DIPHENHYDRAMINE HYDROCHLORIDE 50 MG: 50 INJECTION, SOLUTION INTRAMUSCULAR; INTRAVENOUS at 09:45

## 2019-08-31 RX ADMIN — TEMAZEPAM 30 MG: 15 CAPSULE ORAL at 22:20

## 2019-08-31 RX ADMIN — POTASSIUM PHOSPHATE, MONOBASIC AND POTASSIUM PHOSPHATE, DIBASIC 12 MMOL: 224; 236 INJECTION, SOLUTION INTRAVENOUS at 14:45

## 2019-08-31 RX ADMIN — SODIUM CHLORIDE 150 ML/HR: 0.9 INJECTION, SOLUTION INTRAVENOUS at 17:47

## 2019-08-31 RX ADMIN — MONTELUKAST 10 MG: 10 TABLET, FILM COATED ORAL at 12:24

## 2019-08-31 RX ADMIN — SODIUM CHLORIDE 150 ML/HR: 0.9 INJECTION, SOLUTION INTRAVENOUS at 11:23

## 2019-08-31 RX ADMIN — CETIRIZINE HYDROCHLORIDE 20 MG: 10 TABLET ORAL at 12:22

## 2019-08-31 RX ADMIN — ENOXAPARIN SODIUM 40 MG: 40 INJECTION SUBCUTANEOUS at 13:48

## 2019-08-31 RX ADMIN — ONDANSETRON 4 MG: 2 INJECTION INTRAMUSCULAR; INTRAVENOUS at 11:22

## 2019-08-31 NOTE — ASSESSMENT & PLAN NOTE
US extremity soft tissue   Status: Final result   PACS Images      Show images for US extremity soft tissue   Study Result     LEFT AXILLARY ULTRASOUND     INDICATION:   R52: Pain, unspecified  M79 89: Other specified soft tissue disorders      COMPARISON:  Ultrasound from 9/11/2018, CT a chest, abdomen and pelvis 2/12/2019 and report of mammogram from 6/26/2019     TECHNIQUE:   Real-time ultrasound of the left axilla was performed with a linear transducer with both volumetric sweeps and still imaging techniques      FINDINGS:  There are no fluid collections or suspicious masses detected  There are a few left axillary lymph nodes  A slightly prominent lymph node measures about 1 4 cm short axis  The lymph nodes otherwise demonstrate normal intrinsic appearance  No skin thickening seen  IMPRESSION:     No fluid collections or suspicious masses detected      Left axillary lymph nodes, 1 of which is mildly prominent in size, however, all visualized lymph nodes demonstrate normal intrinsic morphology  This is of doubtful clinical significance  Follow-up if any may be based on clinical grounds       · Outpatient surveillance imaging with PCP

## 2019-08-31 NOTE — ASSESSMENT & PLAN NOTE
· The patient is followed at [facility]  · Continue her medication regimen per [facility]

## 2019-08-31 NOTE — ASSESSMENT & PLAN NOTE
· On chronic Remicade treatment  · The patient was seen in consultation by Gastroenterology  · The patient was initiated on prednisone 50 mg PO Qdaily per Gastroenterology  · The patient is at high risk for DVT/venous thromboembolism in the setting of chronic ulcerative colitis, so she will be placed on lovenox 40 mg SQ every 24 hours DVT prophylaxis even in the setting of rectal bleeding  · The patient underwent a colonoscopy on 08/30/2019 by Gastroenterology with the following results/impression:  IMPRESSION:  Moderately severe proctosigmoiditis -2 linear ulcers noted in the mid rectum  Biopsies were taken to rule out CMV  Random biopsies were taken from ascending, transverse, descending and sigmoid colon      RECOMMENDATION:  Await pathology  Preliminary recommendation repeat in 1 yr    Follow-up with Dr Sheeba Ivory current treatment

## 2019-08-31 NOTE — PROGRESS NOTES
Progress Note - Fred Chao 1975, 53 y o  female MRN: [id number]    Unit/Bed#:  Encounter: [id number]    Primary Care Provider: Beckie Higgins PA-C   Date and time admitted to hospital: 8/26/2019  3:12 PM        * Severe sepsis [facility])  Assessment & Plan  · Severe sepsis was present on admission and secondary to gram-negative bacteremia  · The patient was initially treated with IV cefepime  · The patient was seen in consultation by Infectious Disease  · The lactic acidosis has resolved  · Follow the procalcitonin level  · The patient will require a total of a 14-day antibiotic course per Infectious Disease's recommendations  · The patient will be transitioned to levofloxacin 750 mg PO every 24 hours on 08/31/2019      Bacteremia due to Gram-negative bacteria  Assessment & Plan  · Severe sepsis was present on admission and secondary to gram-negative bacteremia  · The patient was initially treated with IV cefepime  · The patient was seen in consultation by Infectious Disease  · The lactic acidosis has resolved  · Follow the procalcitonin level  · The patient will require a total of a 14-day antibiotic course per Infectious Disease's recommendations  · The patient will be transitioned to levofloxacin 750 mg PO every 24 hours on 08/31/2019        Ulcerative colitis ([facility] )  Assessment & Plan  · On chronic Remicade treatment  · The patient was seen in consultation by Gastroenterology  · The patient was initiated on prednisone 50 mg PO Qdaily per Gastroenterology  · The patient is at high risk for DVT/venous thromboembolism in the setting of chronic ulcerative colitis, so she will be placed on lovenox 40 mg SQ every 24 hours DVT prophylaxis even in the setting of rectal bleeding  · The patient underwent a colonoscopy on 08/30/2019 by Gastroenterology with the following results/impression:  IMPRESSION:  Moderately severe proctosigmoiditis -2 linear ulcers noted in the mid rectum    Biopsies were taken to rule out CMV  Random biopsies were taken from ascending, transverse, descending and sigmoid colon      RECOMMENDATION:  Await pathology  Preliminary recommendation repeat in 1 yr  Follow-up with Dr Nicola Jaimes current treatment    Mass of left axilla  Assessment & Plan  US extremity soft tissue   Status: Final result   PACS Images      Show images for US extremity soft tissue   Study Result     LEFT AXILLARY ULTRASOUND     INDICATION:   R52: Pain, unspecified  M79 89: Other specified soft tissue disorders      COMPARISON:  Ultrasound from 9/11/2018, CT a chest, abdomen and pelvis 2/12/2019 and report of mammogram from 6/26/2019     TECHNIQUE:   Real-time ultrasound of the left axilla was performed with a linear transducer with both volumetric sweeps and still imaging techniques      FINDINGS:  There are no fluid collections or suspicious masses detected  There are a few left axillary lymph nodes  A slightly prominent lymph node measures about 1 4 cm short axis  The lymph nodes otherwise demonstrate normal intrinsic appearance  No skin thickening seen  IMPRESSION:     No fluid collections or suspicious masses detected      Left axillary lymph nodes, 1 of which is mildly prominent in size, however, all visualized lymph nodes demonstrate normal intrinsic morphology  This is of doubtful clinical significance  Follow-up if any may be based on clinical grounds       · Outpatient surveillance imaging with PCP    Immunosuppressed status ([facility] [address] )  Assessment & Plan  · The patient is currently receiving IVIG treatment  · Also on chronic Remicade treatment    Mast cell activation syndrome [facility])  Assessment & Plan  · The patient is followed at [facility]  · Continue her medication regimen per [facility]    Hypophosphatemia  Assessment & Plan  · Replete with potassium phosphate 12 mmol IV x 1 dose  · Follow the phosphorus level    Hypokalemia  Assessment & Plan  · Resolved with IV potassium chloride supplementation  · Follow the potassium level and magnesium level    Vitamin D deficiency  Assessment & Plan  Outpatient follow-up with PCP in regards to this matter      Lactic acidosis  Assessment & Plan  · Secondary to severe sepsis  · Resolved      VTE Pharmacologic Prophylaxis:   Pharmacologic: Enoxaparin (Lovenox)  Mechanical VTE Prophylaxis in Place: Yes    Patient Centered Rounds: I have performed bedside rounds with nursing staff today  Time Spent for Care: 20 minutes  More than 50% of total time spent on counseling and coordination of care as described above  Current Length of Stay: 5 day(s)    Current Patient Status: Inpatient   Certification Statement: The patient will continue to require additional inpatient hospital stay due to the need for continuous IV fluids and for monitoring while switching from IV antibiotics to PO antibiotics  Code Status: Level 1 - Full Code      Subjective: The patient was seen and examined  The patient continues to experience generalized abdominal pain, nausea, and diarrhea  Objective:     Vitals:   Temp (24hrs), Av 1 °F (36 7 °C), Min:97 2 °F (36 2 °C), Max:98 6 °F (37 °C)    Temp:  [97 2 °F (36 2 °C)-98 6 °F (37 °C)] 98 4 °F (36 9 °C)  HR:  [70-76] 74  Resp:  [16-17] 17  BP: ()/(56-70) 108/70  SpO2:  [96 %-100 %] 97 %  Body mass index is 22 48 kg/m²  Input and Output Summary (last 24 hours):        Intake/Output Summary (Last 24 hours) at 2019 1418  Last data filed at 2019 1126  Gross per 24 hour   Intake 4430 ml   Output 4300 ml   Net 130 ml       Physical Exam:     Physical Exam  General:  NAD, awake, alert, follows commands  HEENT:  NC/AT, mucous membranes moist  Neck:  Supple, No JVP elevation  CV:  + S1, + S2, RRR  Pulm:  Lung fields are CTA bilaterally  Abd:  Soft, Generalized tenderness with palpation, Non-distended  Ext:  No clubbing/cyanosis/edema  Skin:  No rashes      Additional Data:    Labs:    Results from last 7 days   Lab Units 08/31/19  0454   WBC Thousand/uL 9 39   HEMOGLOBIN g/dL 12 3   HEMATOCRIT % 37 6   PLATELETS Thousands/uL 367   NEUTROS PCT % 62   LYMPHS PCT % 29   MONOS PCT % 7   EOS PCT % 1     Results from last 7 days   Lab Units 08/31/19  0454   SODIUM mmol/L 142   POTASSIUM mmol/L 3 7   CHLORIDE mmol/L 108   CO2 mmol/L 26   BUN mg/dL 4*   CREATININE mg/dL 0 69   ANION GAP mmol/L 8   CALCIUM mg/dL 8 0*   ALBUMIN g/dL 2 8*   TOTAL BILIRUBIN mg/dL 0 20   ALK PHOS U/L 44*   ALT U/L 17   AST U/L 10   GLUCOSE RANDOM mg/dL 109     Results from last 7 days   Lab Units 08/26/19  1544   INR  0 91             Results from last 7 days   Lab Units 08/31/19  0454 08/30/19  0612 08/29/19  0623 08/28/19  0617 08/27/19  0512  08/26/19  1750 08/26/19  1544   LACTIC ACID mmol/L  --   --   --   --   --   --  1 6 2 3*   PROCALCITONIN ng/ml 0 18 0 32* 1 05* 2 11* 4 46*   < >  --  0 06    < > = values in this interval not displayed  * I Have Reviewed All Lab Data Listed Above  * Additional Pertinent Lab Tests Reviewed: Emeka Jerome Admission Reviewed    Recent Cultures (last 7 days):     Results from last 7 days   Lab Units 08/29/19  0623 08/29/19  0615 08/27/19  1848 08/27/19  1644 08/26/19  1608 08/26/19  1544   BLOOD CULTURE  No Growth at 48 hrs  No Growth at 48 hrs   --   --  Citrobacter freundii* No Growth After 4 Days     GRAM STAIN RESULT   --   --   --   --  Gram negative rods*  --    URINE CULTURE   --   --   --  No Growth <1000 cfu/mL  --   --    C DIFF TOXIN B   --   --  NEGATIVE for C difficle toxin by PCR    --   --   --        Last 24 Hours Medication List:     Current Facility-Administered Medications:  acetaminophen 650 mg Oral Q6H PRN [facility], DO    butalbital-acetaminophen-caffeine 1 tablet Oral Q8H PRN [facility], DO    cetirizine 20 mg Oral BID [facility], DO    diphenhydrAMINE 50 mg Intravenous Q4H PRN [place], DO    enoxaparin 40 mg Subcutaneous Q24H [employer], DO    fluconazole 200 mg Oral Daily Keith Powers PA-C    HYDROmorphone 0 2 mg Intravenous Q4H PRN [employer], DO    Or        HYDROmorphone 0 5 mg Intravenous Q4H PRN [employer], DO    levofloxacin 750 mg Oral Q24H [employer], DO    levothyroxine 50 mcg Oral Early Morning [employer], DO    LORazepam 1 mg Oral Q8H PRN [employer], DO    montelukast 10 mg Oral BID [employer], DO    ondansetron 4 mg Intravenous Q6H PRN [employer], DO    pantoprazole 40 mg Oral BID AC Felipe Tripp,     potassium phosphate 12 mmol Intravenous Once [employer], DO    predniSONE 50 mg Oral Daily Keith Powers PA-C    ranitidine 300 mg Oral Q12H [facility] Felipe Tripp,     saccharomyces boulardii 250 mg Oral BID [employer], DO    sodium chloride 150 mL/hr Intravenous Continuous [employer], DO Last Rate: 150 mL/hr (08/31/19 1123)   temazepam 30 mg Oral HS Felipe Tripp DO      Facility-Administered Medications Ordered in Other Encounters:  sodium chloride 125 mL/hr Intravenous Continuous Eunice Sampson CRNA        Today, Patient Was Seen By: Azael Alvarez DO    ** Please Note: Dictation voice to text software may have been used in the creation of this document   **

## 2019-08-31 NOTE — ASSESSMENT & PLAN NOTE
· Severe sepsis was present on admission and secondary to gram-negative bacteremia  · The patient was initially treated with IV cefepime  · The patient was seen in consultation by Infectious Disease  · The lactic acidosis has resolved  · Follow the procalcitonin level  · The patient will require a total of a 14-day antibiotic course per Infectious Disease's recommendations  · The patient will be transitioned to levofloxacin 750 mg PO every 24 hours on 08/31/2019

## 2019-09-01 PROBLEM — K51.911 EXACERBATION OF ULCERATIVE COLITIS WITH RECTAL BLEEDING (HCC): Status: ACTIVE | Noted: 2017-08-25

## 2019-09-01 LAB
ALBUMIN SERPL BCP-MCNC: 3.4 G/DL (ref 3.5–5)
ALP SERPL-CCNC: 52 U/L (ref 46–116)
ALT SERPL W P-5'-P-CCNC: 15 U/L (ref 12–78)
ANION GAP SERPL CALCULATED.3IONS-SCNC: 7 MMOL/L (ref 4–13)
AST SERPL W P-5'-P-CCNC: 25 U/L (ref 5–45)
BASOPHILS # BLD AUTO: 0.14 THOUSANDS/ΜL (ref 0–0.1)
BASOPHILS NFR BLD AUTO: 2 % (ref 0–1)
BILIRUB SERPL-MCNC: 0.2 MG/DL (ref 0.2–1)
BUN SERPL-MCNC: 2 MG/DL (ref 5–25)
CALCIUM SERPL-MCNC: 8.7 MG/DL (ref 8.3–10.1)
CHLORIDE SERPL-SCNC: 105 MMOL/L (ref 100–108)
CO2 SERPL-SCNC: 26 MMOL/L (ref 21–32)
CREAT SERPL-MCNC: 0.81 MG/DL (ref 0.6–1.3)
EOSINOPHIL # BLD AUTO: 0.34 THOUSAND/ΜL (ref 0–0.61)
EOSINOPHIL NFR BLD AUTO: 5 % (ref 0–6)
ERYTHROCYTE [DISTWIDTH] IN BLOOD BY AUTOMATED COUNT: 12.7 % (ref 11.6–15.1)
GFR SERPL CREATININE-BSD FRML MDRD: 87 ML/MIN/1.73SQ M
GLUCOSE SERPL-MCNC: 88 MG/DL (ref 65–140)
HCT VFR BLD AUTO: 43.4 % (ref 34.8–46.1)
HGB BLD-MCNC: 14.1 G/DL (ref 11.5–15.4)
IMM GRANULOCYTES # BLD AUTO: 0.01 THOUSAND/UL (ref 0–0.2)
IMM GRANULOCYTES NFR BLD AUTO: 0 % (ref 0–2)
LYMPHOCYTES # BLD AUTO: 3.63 THOUSANDS/ΜL (ref 0.6–4.47)
LYMPHOCYTES NFR BLD AUTO: 57 % (ref 14–44)
MAGNESIUM SERPL-MCNC: 1.9 MG/DL (ref 1.6–2.6)
MCH RBC QN AUTO: 32.3 PG (ref 26.8–34.3)
MCHC RBC AUTO-ENTMCNC: 32.5 G/DL (ref 31.4–37.4)
MCV RBC AUTO: 100 FL (ref 82–98)
MONOCYTES # BLD AUTO: 0.43 THOUSAND/ΜL (ref 0.17–1.22)
MONOCYTES NFR BLD AUTO: 7 % (ref 4–12)
NEUTROPHILS # BLD AUTO: 1.88 THOUSANDS/ΜL (ref 1.85–7.62)
NEUTS SEG NFR BLD AUTO: 29 % (ref 43–75)
NRBC BLD AUTO-RTO: 0 /100 WBCS
PHOSPHATE SERPL-MCNC: 2.5 MG/DL (ref 2.7–4.5)
PLATELET # BLD AUTO: 372 THOUSANDS/UL (ref 149–390)
PMV BLD AUTO: 9.6 FL (ref 8.9–12.7)
POTASSIUM SERPL-SCNC: 3.6 MMOL/L (ref 3.5–5.3)
PROT SERPL-MCNC: 7 G/DL (ref 6.4–8.2)
RBC # BLD AUTO: 4.36 MILLION/UL (ref 3.81–5.12)
SODIUM SERPL-SCNC: 138 MMOL/L (ref 136–145)
WBC # BLD AUTO: 6.43 THOUSAND/UL (ref 4.31–10.16)
WBC SPEC QL GRAM STN: NORMAL

## 2019-09-01 PROCEDURE — 99232 SBSQ HOSP IP/OBS MODERATE 35: CPT | Performed by: INTERNAL MEDICINE

## 2019-09-01 PROCEDURE — 80053 COMPREHEN METABOLIC PANEL: CPT | Performed by: INTERNAL MEDICINE

## 2019-09-01 PROCEDURE — 85025 COMPLETE CBC W/AUTO DIFF WBC: CPT | Performed by: INTERNAL MEDICINE

## 2019-09-01 PROCEDURE — 83735 ASSAY OF MAGNESIUM: CPT | Performed by: INTERNAL MEDICINE

## 2019-09-01 PROCEDURE — 84100 ASSAY OF PHOSPHORUS: CPT | Performed by: INTERNAL MEDICINE

## 2019-09-01 RX ORDER — HYDROXYZINE HYDROCHLORIDE 25 MG/1
50 TABLET, FILM COATED ORAL ONCE
Status: DISCONTINUED | OUTPATIENT
Start: 2019-09-01 | End: 2019-09-01

## 2019-09-01 RX ORDER — RANITIDINE 150 MG/1
300 TABLET ORAL
Status: DISCONTINUED | OUTPATIENT
Start: 2019-09-02 | End: 2019-09-12 | Stop reason: HOSPADM

## 2019-09-01 RX ORDER — DIPHENHYDRAMINE HYDROCHLORIDE 50 MG/ML
50 INJECTION INTRAMUSCULAR; INTRAVENOUS EVERY 4 HOURS PRN
Status: DISCONTINUED | OUTPATIENT
Start: 2019-09-01 | End: 2019-09-12 | Stop reason: HOSPADM

## 2019-09-01 RX ORDER — TEMAZEPAM 30 MG/1
30 CAPSULE ORAL
Status: DISCONTINUED | OUTPATIENT
Start: 2019-09-01 | End: 2019-09-12 | Stop reason: HOSPADM

## 2019-09-01 RX ORDER — METHYLPREDNISOLONE SODIUM SUCCINATE 40 MG/ML
40 INJECTION, POWDER, LYOPHILIZED, FOR SOLUTION INTRAMUSCULAR; INTRAVENOUS EVERY 12 HOURS SCHEDULED
Status: DISCONTINUED | OUTPATIENT
Start: 2019-09-01 | End: 2019-09-06

## 2019-09-01 RX ORDER — DIPHENHYDRAMINE HYDROCHLORIDE 50 MG/ML
50 INJECTION INTRAMUSCULAR; INTRAVENOUS EVERY 4 HOURS PRN
Status: DISCONTINUED | OUTPATIENT
Start: 2019-09-01 | End: 2019-09-01

## 2019-09-01 RX ADMIN — ONDANSETRON 4 MG: 2 INJECTION INTRAMUSCULAR; INTRAVENOUS at 23:46

## 2019-09-01 RX ADMIN — DIPHENHYDRAMINE HYDROCHLORIDE 50 MG: 50 INJECTION, SOLUTION INTRAMUSCULAR; INTRAVENOUS at 03:11

## 2019-09-01 RX ADMIN — HYDROMORPHONE HYDROCHLORIDE 0.5 MG: 1 INJECTION, SOLUTION INTRAMUSCULAR; INTRAVENOUS; SUBCUTANEOUS at 11:50

## 2019-09-01 RX ADMIN — SODIUM CHLORIDE 150 ML/HR: 0.9 INJECTION, SOLUTION INTRAVENOUS at 18:36

## 2019-09-01 RX ADMIN — SODIUM CHLORIDE 150 ML/HR: 0.9 INJECTION, SOLUTION INTRAVENOUS at 05:28

## 2019-09-01 RX ADMIN — DIPHENHYDRAMINE HYDROCHLORIDE 50 MG: 50 INJECTION, SOLUTION INTRAMUSCULAR; INTRAVENOUS at 21:02

## 2019-09-01 RX ADMIN — METHYLPREDNISOLONE SODIUM SUCCINATE 40 MG: 40 INJECTION, POWDER, FOR SOLUTION INTRAMUSCULAR; INTRAVENOUS at 11:47

## 2019-09-01 RX ADMIN — PANTOPRAZOLE SODIUM 40 MG: 40 TABLET, DELAYED RELEASE ORAL at 16:11

## 2019-09-01 RX ADMIN — CETIRIZINE HYDROCHLORIDE 20 MG: 10 TABLET ORAL at 18:30

## 2019-09-01 RX ADMIN — HYDROMORPHONE HYDROCHLORIDE 0.5 MG: 1 INJECTION, SOLUTION INTRAMUSCULAR; INTRAVENOUS; SUBCUTANEOUS at 16:19

## 2019-09-01 RX ADMIN — PANTOPRAZOLE SODIUM 40 MG: 40 TABLET, DELAYED RELEASE ORAL at 12:03

## 2019-09-01 RX ADMIN — HYDROMORPHONE HYDROCHLORIDE 0.5 MG: 1 INJECTION, SOLUTION INTRAMUSCULAR; INTRAVENOUS; SUBCUTANEOUS at 21:07

## 2019-09-01 RX ADMIN — HYDROMORPHONE HYDROCHLORIDE 0.5 MG: 1 INJECTION, SOLUTION INTRAMUSCULAR; INTRAVENOUS; SUBCUTANEOUS at 03:11

## 2019-09-01 RX ADMIN — MONTELUKAST 10 MG: 10 TABLET, FILM COATED ORAL at 18:30

## 2019-09-01 RX ADMIN — SODIUM CHLORIDE 150 ML/HR: 0.9 INJECTION, SOLUTION INTRAVENOUS at 00:03

## 2019-09-01 RX ADMIN — POTASSIUM PHOSPHATE, MONOBASIC AND POTASSIUM PHOSPHATE, DIBASIC 12 MMOL: 224; 236 INJECTION, SOLUTION INTRAVENOUS at 11:51

## 2019-09-01 RX ADMIN — BUTALBITAL, ACETAMINOPHEN AND CAFFEINE 1 TABLET: 50; 325; 40 TABLET ORAL at 08:33

## 2019-09-01 RX ADMIN — Medication 250 MG: at 08:02

## 2019-09-01 RX ADMIN — FLUCONAZOLE 200 MG: 40 POWDER, FOR SUSPENSION ORAL at 08:02

## 2019-09-01 RX ADMIN — MONTELUKAST 10 MG: 10 TABLET, FILM COATED ORAL at 12:03

## 2019-09-01 RX ADMIN — METHYLPREDNISOLONE SODIUM SUCCINATE 40 MG: 40 INJECTION, POWDER, FOR SOLUTION INTRAMUSCULAR; INTRAVENOUS at 21:09

## 2019-09-01 RX ADMIN — DIPHENHYDRAMINE HYDROCHLORIDE 50 MG: 50 INJECTION, SOLUTION INTRAMUSCULAR; INTRAVENOUS at 07:53

## 2019-09-01 RX ADMIN — SODIUM CHLORIDE 150 ML/HR: 0.9 INJECTION, SOLUTION INTRAVENOUS at 12:08

## 2019-09-01 RX ADMIN — DIPHENHYDRAMINE HYDROCHLORIDE 50 MG: 50 INJECTION, SOLUTION INTRAMUSCULAR; INTRAVENOUS at 11:48

## 2019-09-01 RX ADMIN — TEMAZEPAM 30 MG: 30 CAPSULE ORAL at 21:20

## 2019-09-01 RX ADMIN — CETIRIZINE HYDROCHLORIDE 20 MG: 10 TABLET ORAL at 12:01

## 2019-09-01 RX ADMIN — LEVOFLOXACIN 750 MG: 750 TABLET, FILM COATED ORAL at 07:54

## 2019-09-01 RX ADMIN — RANITIDINE 300 MG: 150 TABLET ORAL at 16:11

## 2019-09-01 RX ADMIN — LORAZEPAM 1 MG: 1 TABLET ORAL at 23:44

## 2019-09-01 RX ADMIN — RANITIDINE 300 MG: 150 TABLET ORAL at 12:03

## 2019-09-01 RX ADMIN — ENOXAPARIN SODIUM 40 MG: 40 INJECTION SUBCUTANEOUS at 12:01

## 2019-09-01 RX ADMIN — Medication 250 MG: at 18:30

## 2019-09-01 RX ADMIN — HYDROMORPHONE HYDROCHLORIDE 0.5 MG: 1 INJECTION, SOLUTION INTRAMUSCULAR; INTRAVENOUS; SUBCUTANEOUS at 07:53

## 2019-09-01 RX ADMIN — DIPHENHYDRAMINE HYDROCHLORIDE 50 MG: 50 INJECTION, SOLUTION INTRAMUSCULAR; INTRAVENOUS at 16:16

## 2019-09-01 NOTE — PROGRESS NOTES
Progress Note - Ivelisse Piedra 1975, 53 y o  female MRN: [id number]    Unit/Bed#:  Encounter: [id number]    Primary Care Provider: Rachel Hancock PA-C   Date and time admitted to hospital: 8/26/2019  3:12 PM        * Severe sepsis [facility])  Assessment & Plan  · Severe sepsis was present on admission and secondary to gram-negative bacteremia  · The patient was initially treated with IV cefepime  · The patient was seen in consultation by Infectious Disease  · The lactic acidosis has resolved  · Follow the procalcitonin level  · The patient will require a total of a 14-day antibiotic course per Infectious Disease's recommendations  · The patient was transitioned to levofloxacin 750 mg PO every 24 hours on 08/31/2019      Bacteremia due to Gram-negative bacteria  Assessment & Plan  · Severe sepsis was present on admission and secondary to gram-negative bacteremia  · The patient was initially treated with IV cefepime  · The patient was seen in consultation by Infectious Disease  · The lactic acidosis has resolved  · Follow the procalcitonin level  · The patient will require a total of a 14-day antibiotic course per Infectious Disease's recommendations  · The patient was transitioned to levofloxacin 750 mg PO every 24 hours on 08/31/2019        Exacerbation of ulcerative colitis with rectal bleeding (HCC)  Assessment & Plan  · Initiate methylprednisone 40 mg IV every 12 hours on 09/01/2019 with persistent abdominal pain and rectal bleeding  · On chronic Remicade treatment  · The patient was seen in consultation by Gastroenterology  · The patient is at high risk for DVT/venous thromboembolism in the setting of chronic ulcerative colitis, so she will be placed on lovenox 40 mg SQ every 24 hours DVT prophylaxis even in the setting of rectal bleeding  · The patient underwent a colonoscopy on 08/30/2019 by Gastroenterology with the following results/impression:  IMPRESSION:  Moderately severe proctosigmoiditis -2 linear ulcers noted in the mid rectum  Biopsies were taken to rule out CMV  Random biopsies were taken from ascending, transverse, descending and sigmoid colon      RECOMMENDATION:  Await pathology  Preliminary recommendation repeat in 1 yr  Follow-up with Dr Silva Halsted current treatment    Mass of left axilla  Assessment & Plan  US extremity soft tissue   Status: Final result   PACS Images      Show images for US extremity soft tissue   Study Result     LEFT AXILLARY ULTRASOUND     INDICATION:   R52: Pain, unspecified  M79 89: Other specified soft tissue disorders      COMPARISON:  Ultrasound from 9/11/2018, CT a chest, abdomen and pelvis 2/12/2019 and report of mammogram from 6/26/2019     TECHNIQUE:   Real-time ultrasound of the left axilla was performed with a linear transducer with both volumetric sweeps and still imaging techniques      FINDINGS:  There are no fluid collections or suspicious masses detected  There are a few left axillary lymph nodes  A slightly prominent lymph node measures about 1 4 cm short axis  The lymph nodes otherwise demonstrate normal intrinsic appearance  No skin thickening seen  IMPRESSION:     No fluid collections or suspicious masses detected      Left axillary lymph nodes, 1 of which is mildly prominent in size, however, all visualized lymph nodes demonstrate normal intrinsic morphology  This is of doubtful clinical significance  Follow-up if any may be based on clinical grounds       · Outpatient surveillance imaging with PCP    Immunosuppressed status ([facility] [address] )  Assessment & Plan  · The patient is currently receiving IVIG treatment  · Also on chronic Remicade treatment    Mast cell activation syndrome [facility])  Assessment & Plan  · The patient is followed at [facility]  · Continue her medication regimen per [facility]    Hypophosphatemia  Assessment & Plan  · Replete with potassium phosphate 12 mmol IV x 1 dose  · Follow the phosphorus level    Hypokalemia  Assessment & Plan  · Resolved with IV potassium chloride supplementation  · Follow the potassium level and magnesium level    Vitamin D deficiency  Assessment & Plan  Outpatient follow-up with PCP in regards to this matter      Lactic acidosis  Assessment & Plan  · Secondary to severe sepsis  · Resolved      VTE Pharmacologic Prophylaxis:   Pharmacologic: Enoxaparin (Lovenox)  Mechanical VTE Prophylaxis in Place: Yes    Patient Centered Rounds: I have performed bedside rounds with nursing staff today  Time Spent for Care: 20 minutes  More than 50% of total time spent on counseling and coordination of care as described above  Current Length of Stay: 6 day(s)    Current Patient Status: Inpatient   Certification Statement: The patient will continue to require additional inpatient hospital stay due to the need for IV methylprednisolone and for continuous IV fluids  Code Status: Level 1 - Full Code      Subjective: The patient was seen and examined  The patient continues to experience generalized abdominal pain and rectal bleeding  The patient is also experiencing nausea with PO food intake  Objective:     Vitals:   Temp (24hrs), Av 1 °F (36 7 °C), Min:97 2 °F (36 2 °C), Max:98 6 °F (37 °C)    Temp:  [97 2 °F (36 2 °C)-98 6 °F (37 °C)] 97 2 °F (36 2 °C)  HR:  [68-90] 71  Resp:  [16-17] 16  BP: ()/(59-70) 105/70  SpO2:  [97 %-100 %] 100 %  Body mass index is 22 3 kg/m²  Input and Output Summary (last 24 hours):        Intake/Output Summary (Last 24 hours) at 2019 1039  Last data filed at 2019 0830  Gross per 24 hour   Intake 4487 5 ml   Output 5600 ml   Net -1112 5 ml       Physical Exam:     Physical Exam  General:  NAD, awake, alert, follows commands  HEENT:  NC/AT, mucous membranes moist  Neck:  Supple, No JVP elevation  CV:  + S1, + S2, RRR  Pulm:  Lung fields are CTA bilaterally  Abd:  Soft, Mild distention, Right middle abdominal tenderness and left middle abdominal tenderness with palpation  Ext:  No clubbing/cyanosis/edema  Skin:  No rashes      Additional Data:    Labs:    Results from last 7 days   Lab Units 09/01/19  0830   WBC Thousand/uL 6 43   HEMOGLOBIN g/dL 14 1   HEMATOCRIT % 43 4   PLATELETS Thousands/uL 372   NEUTROS PCT % 29*   LYMPHS PCT % 57*   MONOS PCT % 7   EOS PCT % 5     Results from last 7 days   Lab Units 09/01/19  0830   SODIUM mmol/L 138   POTASSIUM mmol/L 3 6   CHLORIDE mmol/L 105   CO2 mmol/L 26   BUN mg/dL 2*   CREATININE mg/dL 0 81   ANION GAP mmol/L 7   CALCIUM mg/dL 8 7   ALBUMIN g/dL 3 4*   TOTAL BILIRUBIN mg/dL 0 20   ALK PHOS U/L 52   ALT U/L 15   AST U/L 25   GLUCOSE RANDOM mg/dL 88     Results from last 7 days   Lab Units 08/26/19  1544   INR  0 91             Results from last 7 days   Lab Units 08/31/19  0454 08/30/19  0612 08/29/19  0623 08/28/19  0617 08/27/19  0512  08/26/19  1750 08/26/19  1544   LACTIC ACID mmol/L  --   --   --   --   --   --  1 6 2 3*   PROCALCITONIN ng/ml 0 18 0 32* 1 05* 2 11* 4 46*   < >  --  0 06    < > = values in this interval not displayed  * I Have Reviewed All Lab Data Listed Above  * Additional Pertinent Lab Tests Reviewed: [address] Admission Reviewed      Recent Cultures (last 7 days):     Results from last 7 days   Lab Units 08/29/19  0623 08/29/19  0615 08/27/19  1848 08/27/19  1644 08/26/19  1608 08/26/19  1544   BLOOD CULTURE  No Growth at 48 hrs  No Growth at 48 hrs   --   --  Citrobacter freundii* No Growth After 5 Days     GRAM STAIN RESULT   --   --   --   --  Gram negative rods*  --    URINE CULTURE   --   --   --  No Growth <1000 cfu/mL  --   --    C DIFF TOXIN B   --   --  NEGATIVE for C difficle toxin by PCR    --   --   --        Last 24 Hours Medication List:     Current Facility-Administered Medications:  acetaminophen 650 mg Oral Q6H PRN Lavena Blake, DO    butalbital-acetaminophen-caffeine 1 tablet Oral Q8H PRN Shana Blake DO cetirizine 20 mg Oral BID Gilda Punta [place], DO    diphenhydrAMINE 50 mg Intravenous Q4H PRN Gilda [place], DO    enoxaparin 40 mg Subcutaneous Q24H Gilda Punta [place], DO    fluconazole 200 mg Oral Daily Jamie Mitchell PA-C    HYDROmorphone 0 2 mg Intravenous Q4H PRN Gilda Punta [place], DO    Or        HYDROmorphone 0 5 mg Intravenous Q4H PRN Gilda Punta [place], DO    levofloxacin 750 mg Oral Q24H Gilda Punta [place], DO    levothyroxine 50 mcg Oral Early Morning Gilda Punta [place], DO    LORazepam 1 mg Oral Q8H PRN Gilda Punta [place], DO    methylPREDNISolone sodium succinate 40 mg Intravenous Q12H [address] Felipe Tripp,     montelukast 10 mg Oral BID Gilda Punta Santiago, DO    ondansetron 4 mg Intravenous Q6H PRN Gilda Punta [place], DO    pantoprazole 40 mg Oral BID AC Felipe Tripp,     potassium phosphate 12 mmol Intravenous Once Gilda Punta [place], DO    ranitidine 300 mg Oral Q12H [address] Felipe Tripp,     saccharomyces boulardii 250 mg Oral BID Gilda Punta [place], DO    sodium chloride 150 mL/hr Intravenous Continuous Gildamaira Russell DO Last Rate: 150 mL/hr (09/01/19 0528)   temazepam 30 mg Oral HS Gildamaira Russell, DO      Facility-Administered Medications Ordered in Other Encounters:  sodium chloride 125 mL/hr Intravenous Continuous Eunice Sampson CRNA        Today, Patient Was Seen By: Gilda Russell DO    ** Please Note: Dictation voice to text software may have been used in the creation of this document   **

## 2019-09-01 NOTE — ASSESSMENT & PLAN NOTE
· Initiate methylprednisone 40 mg IV every 12 hours on 2019 with persistent abdominal pain and rectal bleeding  · On chronic Remicade treatment  · The patient was seen in consultation by Gastroenterology  · The patient is at high risk for DVT/venous thromboembolism in the setting of chronic ulcerative colitis, so she will be placed on lovenox 40 mg SQ every 24 hours DVT prophylaxis even in the setting of rectal bleeding  · The patient underwent a colonoscopy on 2019 by Gastroenterology with the following results/impression:  IMPRESSION:  Moderately severe proctosigmoiditis -2 linear ulcers noted in the mid rectum  Biopsies were taken to rule out CMV  Random biopsies were taken from ascending, transverse, descending and sigmoid colon      RECOMMENDATION:  Await pathology  Preliminary recommendation repeat in 1 yr    Follow-up with Dr Caryn Apgar current treatment

## 2019-09-01 NOTE — ASSESSMENT & PLAN NOTE
· Severe sepsis was present on admission and secondary to gram-negative bacteremia  · The patient was initially treated with IV cefepime  · The patient was seen in consultation by Infectious Disease  · The lactic acidosis has resolved  · Follow the procalcitonin level  · The patient will require a total of a 14-day antibiotic course per Infectious Disease's recommendations  · The patient was transitioned to levofloxacin 750 mg PO every 24 hours on 08/31/2019

## 2019-09-01 NOTE — UTILIZATION REVIEW
Continued Stay Review    Date: 9/1/19                      Current Patient Class: Inpatient   Current Level of Care: Med/surg  HPI:44 y o  female initially admitted on 8/26 for severe sepsis  Assessment/Plan:   Progress 9/1:  Initiate methylprednisone 40 mg IV every 12 hours on 09/01/2019 with persistent abdominal pain and rectal bleeding  She continues with generalized abdominal pain, and rectal bleeding  Complains of nausea with food intake  Continue IV fluids  Progress 8/31: follow procalcitonin  Per ID, she will require 14 day antibiotic coarse  She is in an immunosuppressed state due to IVIG treatment and remicade  Continues to require IV antibiotics      Pertinent Labs/Diagnostic Results:   Results from last 7 days   Lab Units 09/01/19  0830 08/31/19  0454 08/30/19  0612 08/29/19  0623 08/28/19  0617   WBC Thousand/uL 6 43 9 39 5 27 5 25 6 07   HEMOGLOBIN g/dL 14 1 12 3 13 8 14 0 13 1   HEMATOCRIT % 43 4 37 6 41 2 42 7 40 0   PLATELETS Thousands/uL 372 367 378 369 333   NEUTROS ABS Thousands/µL 1 88 5 90  --  2 18 2 58         Results from last 7 days   Lab Units 09/01/19  0830 08/31/19  0454 08/30/19  0612 08/29/19  0623 08/28/19  0617 08/27/19  0512   SODIUM mmol/L 138 142 139 140 138 142   POTASSIUM mmol/L 3 6 3 7 4 0 4 1 4 4 3 3*   CHLORIDE mmol/L 105 108 105 105 107 107   CO2 mmol/L 26 26 28 28 25 26   ANION GAP mmol/L 7 8 6 7 6 9   BUN mg/dL 2* 4* 3* 4* 4* 7   CREATININE mg/dL 0 81 0 69 0 68 0 84 0 73 0 62   EGFR ml/min/1 73sq m 87 105 105 84 99 108   CALCIUM mg/dL 8 7 8 0* 8 2* 8 4 8 3 7 8*   CALCIUM, IONIZED mmol/L  --   --   --   --  1 17  --    MAGNESIUM mg/dL 1 9 1 9 1 8 2 0 2 1 1 9   PHOSPHORUS mg/dL 2 5* 1 9*  --  3 2 3 0 2 4*     Results from last 7 days   Lab Units 09/01/19  0830 08/31/19  0454 08/30/19  0612 08/29/19  0623 08/28/19  0617   AST U/L 25 10 20 21 22   ALT U/L 15 17 26 22 23   ALK PHOS U/L 52 44* 51 46 43*   TOTAL PROTEIN g/dL 7 0 5 9* 6 5 6 7 6 7   ALBUMIN g/dL 3 4* 2 8* 3  2* 3 2* 3 3*   TOTAL BILIRUBIN mg/dL 0 20 0 20 0 30 0 30 0 30         Results from last 7 days   Lab Units 09/01/19  0830 08/31/19  0454 08/30/19  0612 08/29/19  0623 08/28/19  0617 08/27/19  0512 08/26/19  1544   GLUCOSE RANDOM mg/dL 88 109 119 83 84 78 94         Results from last 7 days   Lab Units 08/26/19  1544   PROTIME seconds 12 2   INR  0 91   PTT seconds 24         Results from last 7 days   Lab Units 08/31/19  0454 08/30/19  0612 08/29/19  0623 08/28/19  0617 08/27/19  0512   PROCALCITONIN ng/ml 0 18 0 32* 1 05* 2 11* 4 46*     Results from last 7 days   Lab Units 08/26/19  1750 08/26/19  1544   LACTIC ACID mmol/L 1 6 2 3*     Results from last 7 days   Lab Units 08/26/19  1544   LIPASE u/L 117             Results from last 7 days   Lab Units 08/26/19  1650   CLARITY UA  Clear   COLOR UA  Yellow   SPEC GRAV UA  1 020   PH UA  6 0   GLUCOSE UA mg/dl Negative   KETONES UA mg/dl Negative   BLOOD UA  Trace-Intact*   PROTEIN UA mg/dl Negative   NITRITE UA  Negative   BILIRUBIN UA  Negative   UROBILINOGEN UA E U /dl 0 2   LEUKOCYTES UA  Negative   WBC UA /hpf 0-1*   RBC UA /hpf 1-2*   BACTERIA UA /hpf None Seen   EPITHELIAL CELLS WET PREP /hpf Occasional                 Results from last 7 days   Lab Units 08/27/19  1848   C DIFF TOXIN B  NEGATIVE for C difficle toxin by PCR  Results from last 7 days   Lab Units 08/28/19  1515   SALMONELLA SP PCR  None Detected   SHIGELLA SP/ENTEROINVASIVE E  COLI (EIEC)  None Detected   CAMPYLOBACTER SP (JEJUNI AND COLI)  None Detected   SHIGA TOXIN 1/SHIGA TOXIN 2  None Detected         Results from last 7 days   Lab Units 08/29/19  0623 08/29/19  0615 08/27/19  1644 08/26/19  1608 08/26/19  1544   BLOOD CULTURE  No Growth at 72 hrs  No Growth at 72 hrs   --  Citrobacter freundii* No Growth After 5 Days     GRAM STAIN RESULT   --   --   --  Gram negative rods*  --    URINE CULTURE   --   --  No Growth <1000 cfu/mL  --   --          Vital Signs:   /Time  Temp  Pulse Resp  BP  MAP (mmHg)  SpO2  O2 Device  Patient Position - Orthostatic VS   09/01/19 0748  97 2 °F (36 2 °C)Abnormal   71  16  105/70  80  100 %  None (Room air)  Lying   08/31/19 2301  98 6 °F (37 °C)  68  16  95/59  --  97 %  None (Room air)  Lying   08/31/19 1642  98 2 °F (36 8 °C)  90  16  104/70  80  97 %  None (Room air)  Lying   08/31/19 1400  98 4 °F (36 9 °C)  74  17  108/70  84  97 %  None (Room air)  Lying   08/31/19 0801  97 2 °F (36 2 °C)Abnormal                      Medications:   Scheduled Meds:   Current Facility-Administered Medications:  acetaminophen 650 mg Oral Q6H PRN   butalbital-acetaminophen-caffeine 1 tablet Oral Q8H PRN   cetirizine 20 mg Oral BID   diphenhydrAMINE 50 mg Intravenous Q4H PRN   enoxaparin 40 mg Subcutaneous Q24H   fluconazole 200 mg Oral Daily   HYDROmorphone 0 2 mg Intravenous Q4H PRN   Or      HYDROmorphone 0 5 mg Intravenous Q4H PRN   levofloxacin 750 mg Oral Q24H   levothyroxine 50 mcg Oral Early Morning   LORazepam 1 mg Oral Q8H PRN   methylPREDNISolone sodium succinate 40 mg Intravenous Q12H [facility]   montelukast 10 mg Oral BID   ondansetron 4 mg Intravenous Q6H PRN   pantoprazole 40 mg Oral BID AC   potassium phosphate 12 mmol Intravenous Once   ranitidine 300 mg Oral Q12H [facility]   saccharomyces boulardii 250 mg Oral BID   sodium chloride 150 mL/hr Intravenous Continuous   temazepam 30 mg Oral HS     Facility-Administered Medications Ordered in Other Encounters:  sodium chloride 125 mL/hr Intravenous Continuous Eunice Sampson CRNA       Discharge Plan: TBD  Network Utilization Review Department  Phone: [phone]; Fax [phone]  [email or web address]  ATTENTION: Please call with any questions or concerns to [phone]  and carefully listen to the prompts so that you are directed to the right person  Send all requests for admission clinical reviews, approved or denied determinations and any other requests to fax [phone]   All voicemails are confidential

## 2019-09-01 NOTE — ASSESSMENT & PLAN NOTE
· The patient is followed at [facility]  · Continue her medication regimen per [facility]

## 2019-09-02 LAB
ALBUMIN SERPL BCP-MCNC: 3 G/DL (ref 3.5–5)
ALP SERPL-CCNC: 55 U/L (ref 46–116)
ALT SERPL W P-5'-P-CCNC: 47 U/L (ref 12–78)
ANION GAP SERPL CALCULATED.3IONS-SCNC: 7 MMOL/L (ref 4–13)
AST SERPL W P-5'-P-CCNC: 38 U/L (ref 5–45)
BASOPHILS # BLD AUTO: 0.01 THOUSANDS/ΜL (ref 0–0.1)
BASOPHILS NFR BLD AUTO: 0 % (ref 0–1)
BILIRUB SERPL-MCNC: 0.2 MG/DL (ref 0.2–1)
BUN SERPL-MCNC: 4 MG/DL (ref 5–25)
CALCIUM SERPL-MCNC: 8.5 MG/DL (ref 8.3–10.1)
CHLORIDE SERPL-SCNC: 105 MMOL/L (ref 100–108)
CO2 SERPL-SCNC: 28 MMOL/L (ref 21–32)
CREAT SERPL-MCNC: 0.64 MG/DL (ref 0.6–1.3)
EOSINOPHIL # BLD AUTO: 0 THOUSAND/ΜL (ref 0–0.61)
EOSINOPHIL NFR BLD AUTO: 0 % (ref 0–6)
ERYTHROCYTE [DISTWIDTH] IN BLOOD BY AUTOMATED COUNT: 12.3 % (ref 11.6–15.1)
GFR SERPL CREATININE-BSD FRML MDRD: 107 ML/MIN/1.73SQ M
GLUCOSE SERPL-MCNC: 138 MG/DL (ref 65–140)
HCT VFR BLD AUTO: 39.9 % (ref 34.8–46.1)
HGB BLD-MCNC: 13.2 G/DL (ref 11.5–15.4)
IMM GRANULOCYTES # BLD AUTO: 0.03 THOUSAND/UL (ref 0–0.2)
IMM GRANULOCYTES NFR BLD AUTO: 0 % (ref 0–2)
LYMPHOCYTES # BLD AUTO: 1.05 THOUSANDS/ΜL (ref 0.6–4.47)
LYMPHOCYTES NFR BLD AUTO: 12 % (ref 14–44)
MAGNESIUM SERPL-MCNC: 1.7 MG/DL (ref 1.6–2.6)
MCH RBC QN AUTO: 32.3 PG (ref 26.8–34.3)
MCHC RBC AUTO-ENTMCNC: 33.1 G/DL (ref 31.4–37.4)
MCV RBC AUTO: 98 FL (ref 82–98)
MONOCYTES # BLD AUTO: 0.21 THOUSAND/ΜL (ref 0.17–1.22)
MONOCYTES NFR BLD AUTO: 2 % (ref 4–12)
NEUTROPHILS # BLD AUTO: 7.6 THOUSANDS/ΜL (ref 1.85–7.62)
NEUTS SEG NFR BLD AUTO: 86 % (ref 43–75)
NRBC BLD AUTO-RTO: 0 /100 WBCS
PHOSPHATE SERPL-MCNC: 3.5 MG/DL (ref 2.7–4.5)
PLATELET # BLD AUTO: 389 THOUSANDS/UL (ref 149–390)
PMV BLD AUTO: 9 FL (ref 8.9–12.7)
POTASSIUM SERPL-SCNC: 4.1 MMOL/L (ref 3.5–5.3)
PROCALCITONIN SERPL-MCNC: <0.05 NG/ML
PROT SERPL-MCNC: 6.6 G/DL (ref 6.4–8.2)
RBC # BLD AUTO: 4.09 MILLION/UL (ref 3.81–5.12)
SODIUM SERPL-SCNC: 140 MMOL/L (ref 136–145)
WBC # BLD AUTO: 8.9 THOUSAND/UL (ref 4.31–10.16)

## 2019-09-02 PROCEDURE — 99232 SBSQ HOSP IP/OBS MODERATE 35: CPT | Performed by: INTERNAL MEDICINE

## 2019-09-02 PROCEDURE — 84100 ASSAY OF PHOSPHORUS: CPT | Performed by: PHYSICIAN ASSISTANT

## 2019-09-02 PROCEDURE — 80053 COMPREHEN METABOLIC PANEL: CPT | Performed by: PHYSICIAN ASSISTANT

## 2019-09-02 PROCEDURE — 84145 PROCALCITONIN (PCT): CPT | Performed by: PHYSICIAN ASSISTANT

## 2019-09-02 PROCEDURE — 83735 ASSAY OF MAGNESIUM: CPT | Performed by: PHYSICIAN ASSISTANT

## 2019-09-02 PROCEDURE — 85025 COMPLETE CBC W/AUTO DIFF WBC: CPT | Performed by: PHYSICIAN ASSISTANT

## 2019-09-02 RX ORDER — MAGNESIUM SULFATE HEPTAHYDRATE 40 MG/ML
2 INJECTION, SOLUTION INTRAVENOUS ONCE
Status: COMPLETED | OUTPATIENT
Start: 2019-09-02 | End: 2019-09-02

## 2019-09-02 RX ADMIN — PANTOPRAZOLE SODIUM 40 MG: 40 TABLET, DELAYED RELEASE ORAL at 16:58

## 2019-09-02 RX ADMIN — ENOXAPARIN SODIUM 40 MG: 40 INJECTION SUBCUTANEOUS at 14:29

## 2019-09-02 RX ADMIN — DIPHENHYDRAMINE HYDROCHLORIDE 50 MG: 50 INJECTION, SOLUTION INTRAMUSCULAR; INTRAVENOUS at 06:09

## 2019-09-02 RX ADMIN — METHYLPREDNISOLONE SODIUM SUCCINATE 40 MG: 40 INJECTION, POWDER, FOR SOLUTION INTRAMUSCULAR; INTRAVENOUS at 21:14

## 2019-09-02 RX ADMIN — Medication 250 MG: at 08:31

## 2019-09-02 RX ADMIN — RANITIDINE 300 MG: 150 TABLET ORAL at 06:08

## 2019-09-02 RX ADMIN — SODIUM CHLORIDE 150 ML/HR: 0.9 INJECTION, SOLUTION INTRAVENOUS at 23:29

## 2019-09-02 RX ADMIN — FLUCONAZOLE 200 MG: 40 POWDER, FOR SUSPENSION ORAL at 08:32

## 2019-09-02 RX ADMIN — SODIUM CHLORIDE 150 ML/HR: 0.9 INJECTION, SOLUTION INTRAVENOUS at 08:31

## 2019-09-02 RX ADMIN — DIPHENHYDRAMINE HYDROCHLORIDE 50 MG: 50 INJECTION, SOLUTION INTRAMUSCULAR; INTRAVENOUS at 21:16

## 2019-09-02 RX ADMIN — CETIRIZINE HYDROCHLORIDE 20 MG: 10 TABLET ORAL at 12:20

## 2019-09-02 RX ADMIN — TEMAZEPAM 30 MG: 30 CAPSULE ORAL at 21:18

## 2019-09-02 RX ADMIN — SODIUM CHLORIDE 150 ML/HR: 0.9 INJECTION, SOLUTION INTRAVENOUS at 17:02

## 2019-09-02 RX ADMIN — MONTELUKAST 10 MG: 10 TABLET, FILM COATED ORAL at 12:21

## 2019-09-02 RX ADMIN — LEVOFLOXACIN 750 MG: 750 TABLET, FILM COATED ORAL at 08:31

## 2019-09-02 RX ADMIN — LORAZEPAM 1 MG: 1 TABLET ORAL at 16:57

## 2019-09-02 RX ADMIN — MONTELUKAST 10 MG: 10 TABLET, FILM COATED ORAL at 17:00

## 2019-09-02 RX ADMIN — DIPHENHYDRAMINE HYDROCHLORIDE 50 MG: 50 INJECTION, SOLUTION INTRAMUSCULAR; INTRAVENOUS at 01:14

## 2019-09-02 RX ADMIN — SODIUM CHLORIDE 150 ML/HR: 0.9 INJECTION, SOLUTION INTRAVENOUS at 01:19

## 2019-09-02 RX ADMIN — DIPHENHYDRAMINE HYDROCHLORIDE 50 MG: 50 INJECTION, SOLUTION INTRAMUSCULAR; INTRAVENOUS at 10:07

## 2019-09-02 RX ADMIN — Medication 250 MG: at 17:00

## 2019-09-02 RX ADMIN — RANITIDINE 300 MG: 150 TABLET ORAL at 16:57

## 2019-09-02 RX ADMIN — PANTOPRAZOLE SODIUM 40 MG: 40 TABLET, DELAYED RELEASE ORAL at 12:20

## 2019-09-02 RX ADMIN — HYDROMORPHONE HYDROCHLORIDE 0.5 MG: 1 INJECTION, SOLUTION INTRAMUSCULAR; INTRAVENOUS; SUBCUTANEOUS at 01:15

## 2019-09-02 RX ADMIN — DIPHENHYDRAMINE HYDROCHLORIDE 50 MG: 50 INJECTION, SOLUTION INTRAMUSCULAR; INTRAVENOUS at 14:29

## 2019-09-02 RX ADMIN — CETIRIZINE HYDROCHLORIDE 20 MG: 10 TABLET ORAL at 17:00

## 2019-09-02 RX ADMIN — METHYLPREDNISOLONE SODIUM SUCCINATE 40 MG: 40 INJECTION, POWDER, FOR SOLUTION INTRAMUSCULAR; INTRAVENOUS at 08:31

## 2019-09-02 RX ADMIN — RANITIDINE 300 MG: 150 TABLET ORAL at 12:28

## 2019-09-02 RX ADMIN — MAGNESIUM SULFATE HEPTAHYDRATE 2 G: 40 INJECTION, SOLUTION INTRAVENOUS at 12:20

## 2019-09-02 NOTE — PLAN OF CARE
Problem: Potential for Falls  Goal: Patient will remain free of falls  Description  INTERVENTIONS:  - Assess patient frequently for physical needs  -  Identify cognitive and physical deficits and behaviors that affect risk of falls  -  [place] fall precautions as indicated by assessment  Medium fall risk   - Educate patient/family on patient safety including physical limitations  - Instruct patient to call for assistance with activity based on assessment  - Modify environment to reduce risk of injury  - Consider OT/PT consult to assist with strengthening/mobility   Outcome: Progressing     Problem: Nutrition/Hydration-ADULT  Goal: Nutrient/Hydration intake appropriate for improving, restoring or maintaining nutritional needs  Description  Monitor and assess patient's nutrition/hydration status for malnutrition  Collaborate with interdisciplinary team and initiate plan and interventions as ordered  Monitor patient's weight and dietary intake as ordered or per policy  Utilize nutrition screening tool and intervene as necessary  Determine patient's food preferences and provide high-protein, high-caloric foods as appropriate       INTERVENTIONS:  - Monitor oral intake, urinary output, labs, and treatment plans  - Assess nutrition and hydration status and recommend course of action  - Evaluate amount of meals eaten  - Allow adequate time for meals  - Recommend/ encourage appropriate diets, oral nutritional supplements, and vitamin/mineral supplements  - Order, calculate, and assess calorie counts as needed  - Assess need for intravenous fluids  - Provide specific nutrition/hydration education as appropriate  - Include patient/family/caregiver in decisions related to nutrition   Outcome: Progressing     Problem: PAIN - ADULT  Goal: Verbalizes/displays adequate comfort level or baseline comfort level  Description  Interventions:  - Encourage patient to monitor pain and request assistance  - Assess pain using appropriate pain scale; 0-10 pain scale  - Administer analgesics based on type and severity of pain and evaluate response  - Implement non-pharmacological measures as appropriate and evaluate response  - Consider cultural and social influences on pain and pain management  - Notify physician/advanced practitioner if interventions unsuccessful or patient reports new pain   Outcome: Progressing     Problem: INFECTION - ADULT  Goal: Absence or prevention of progression during hospitalization  Description  INTERVENTIONS:  - Assess and monitor for signs and symptoms of infection  - Monitor lab/diagnostic results  - Monitor all insertion sites, i e  indwelling lines, tubes, and drains  - Administer medications as ordered  - Instruct and encourage patient and family to use good hand hygiene technique   Outcome: Progressing     Problem: SAFETY ADULT  Goal: Maintain or return to baseline ADL function  Description  INTERVENTIONS:  -  Assess patient's ability to carry out ADLs; independent  - Assess patient's mobility; independent  - Encourage maximum independence but intervene and supervise when necessary  - Involve family in performance of ADLs  - Assess for home care needs following discharge   - Consider OT consult to assist with ADL evaluation and planning for discharge  - Provide patient education as appropriate   Outcome: Progressing  Goal: Maintain or return mobility status to optimal level  Description  INTERVENTIONS:  - Assess patient's baseline mobility status -Independent  - Identify cognitive and physical deficits and behaviors that affect mobility  - [place] fall precautions as indicated by assessment   Medium fall risk   - Record patient progress and toleration of activity level on Mobility SBAR; progress patient to next Phase/Stage  - Instruct patient to call for assistance with activity based on assessment  - Consider rehabilitation consult to assist with strengthening/weightbearing, etc    Outcome: Progressing     Problem: DISCHARGE PLANNING  Goal: Discharge to home or other facility with appropriate resources  Description  INTERVENTIONS:  - Identify barriers to discharge w/patient and caregiver  - Arrange for needed discharge resources and transportation as appropriate  - Identify discharge learning needs (meds, wound care, etc )  - Refer to Case Management Department for coordinating discharge planning if the patient needs post-hospital services based on physician/advanced practitioner order or complex needs related to functional status, cognitive ability, or social support system   Outcome: Progressing     Problem: Knowledge Deficit  Goal: Patient/family/caregiver demonstrates understanding of disease process, treatment plan, medications, and discharge instructions  Description  Complete learning assessment and assess knowledge base    Interventions:  - Provide teaching at level of understanding  - Provide teaching via preferred learning methods  Outcome: Progressing     Problem: GASTROINTESTINAL - ADULT  Goal: Minimal or absence of nausea and/or vomiting  Description  INTERVENTIONS:  - Administer IV fluids if ordered to ensure adequate hydration  - Maintain NPO status until nausea and vomiting are resolved  - Administer ordered antiemetic medications as needed  - Provide nonpharmacologic comfort measures as appropriate  - Advance diet as tolerated, if ordered  - Consider nutrition services referral to assist patient with adequate nutrition and appropriate food choices   Outcome: Progressing  Goal: Maintains or returns to baseline bowel function  Description  INTERVENTIONS:  - Assess bowel function  - Encourage oral fluids to ensure adequate hydration  - Administer IV fluids if ordered to ensure adequate hydration  - Administer ordered medications as needed  - Encourage mobilization and activity  - Consider nutritional services referral to assist patient with adequate nutrition and appropriate food choices  Outcome: Progressing  Goal: Maintains adequate nutritional intake  Description  INTERVENTIONS:  - Monitor percentage of each meal consumed  - Identify factors contributing to decreased intake, treat as appropriate  - Assist with meals as needed  - Monitor I&O, weight, and lab values if indicated  - Obtain nutrition services referral as needed  Outcome: Progressing     Problem: METABOLIC, FLUID AND ELECTROLYTES - ADULT  Goal: Electrolytes maintained within normal limits  Description  INTERVENTIONS:  - Monitor labs and assess patient for signs and symptoms of electrolyte imbalances  - Administer electrolyte replacement as ordered  - Monitor response to electrolyte replacements, including repeat lab results as appropriate  - Instruct patient on fluid and nutrition as appropriate  Outcome: Progressing  Goal: Fluid balance maintained  Description  INTERVENTIONS:  - Monitor labs   - Monitor I/O and WT  - Instruct patient on fluid and nutrition as appropriate  - Assess for signs & symptoms of volume excess or deficit  Outcome: Progressing     Problem: DISCHARGE PLANNING - CARE MANAGEMENT  Goal: Discharge to post-acute care or home with appropriate resources  Description  INTERVENTIONS:  - Conduct assessment to determine patient/family and health care team treatment goals, and need for post-acute services based on payer coverage, community resources, and patient preferences, and barriers to discharge  - Address psychosocial, clinical, and financial barriers to discharge as identified in assessment in conjunction with the patient/family and health care team  - Arrange appropriate level of post-acute services according to patient's   needs and preference and payer coverage in collaboration with the physician and health care team  - Communicate with and update the patient/family, physician, and health care team regarding progress on the discharge plan  - Arrange appropriate transportation to post-acute venues  Pt will return home with       Outcome: Progressing

## 2019-09-02 NOTE — NURSING NOTE
Pt transferred to 4th floor via w/c,room 405;belongings w/pt;pt preferred to text family concerning room change

## 2019-09-02 NOTE — PROGRESS NOTES
Progress Note - Aixa Mario 1975, 53 y o  female MRN: [id number]    Unit/Bed#: 405-01 Encounter: [id number]    Primary Care Provider: Ildefonso Bliss PA-C   Date and time admitted to hospital: 8/26/2019  3:12 PM        * Severe sepsis [facility])  Assessment & Plan  · Severe sepsis was present on admission and secondary to gram-negative bacteremia  · The patient was initially treated with IV cefepime  · The patient was seen in consultation by Infectious Disease  · The lactic acidosis has resolved  · Follow the procalcitonin level  · The patient will require a total of a 14-day antibiotic course per Infectious Disease's recommendations  · The patient was transitioned to levofloxacin 750 mg PO every 24 hours on 08/31/2019      Bacteremia due to Gram-negative bacteria  Assessment & Plan  · Severe sepsis was present on admission and secondary to gram-negative bacteremia  · The patient was initially treated with IV cefepime  · The patient was seen in consultation by Infectious Disease  · The lactic acidosis has resolved  · Follow the procalcitonin level  · The patient will require a total of a 14-day antibiotic course per Infectious Disease's recommendations  · The patient was transitioned to levofloxacin 750 mg PO every 24 hours on 08/31/2019        Exacerbation of ulcerative colitis with rectal bleeding ([facility] [address] )  Assessment & Plan  · The patient was initiated on methylprednisone 40 mg IV every 12 hours on 09/01/2019 with persistent abdominal pain and rectal bleeding  · On chronic Remicade treatment  · The patient was seen in consultation by Gastroenterology  · Continue NSS IV fluids  · The patient is at high risk for DVT/venous thromboembolism in the setting of chronic ulcerative colitis, so she will be placed on lovenox 40 mg SQ every 24 hours DVT prophylaxis even in the setting of rectal bleeding  · The patient underwent a colonoscopy on 08/30/2019 by Gastroenterology with the following results/impression:  IMPRESSION:  Moderately severe proctosigmoiditis -2 linear ulcers noted in the mid rectum  Biopsies were taken to rule out CMV  Random biopsies were taken from ascending, transverse, descending and sigmoid colon      RECOMMENDATION:  Await pathology  Preliminary recommendation repeat in 1 yr  Follow-up with Dr Ryan Peck current treatment    Mass of left axilla  Assessment & Plan  · Check an LDH level    US extremity soft tissue   Status: Final result   PACS Images      Show images for US extremity soft tissue   Study Result     LEFT AXILLARY ULTRASOUND     INDICATION:   R52: Pain, unspecified  M79 89: Other specified soft tissue disorders      COMPARISON:  Ultrasound from 9/11/2018, CT a chest, abdomen and pelvis 2/12/2019 and report of mammogram from 6/26/2019     TECHNIQUE:   Real-time ultrasound of the left axilla was performed with a linear transducer with both volumetric sweeps and still imaging techniques      FINDINGS:  There are no fluid collections or suspicious masses detected  There are a few left axillary lymph nodes  A slightly prominent lymph node measures about 1 4 cm short axis  The lymph nodes otherwise demonstrate normal intrinsic appearance  No skin thickening seen  IMPRESSION:     No fluid collections or suspicious masses detected      Left axillary lymph nodes, 1 of which is mildly prominent in size, however, all visualized lymph nodes demonstrate normal intrinsic morphology  This is of doubtful clinical significance  Follow-up if any may be based on clinical grounds       · Outpatient surveillance imaging with PCP    Immunosuppressed status ([facility] [address] )  Assessment & Plan  · The patient is currently receiving IVIG treatment  · Also on chronic Remicade treatment    Mast cell activation syndrome [facility])  Assessment & Plan  · The patient is followed at [facility]  · Continue her medication regimen per [facility]    Hypophosphatemia  Assessment & Plan  · Resolved with IV potassium phosphate replacement therapy  · Follow the phosphorus level    Hypokalemia  Assessment & Plan  · Resolved with IV potassium chloride supplementation  · Follow the potassium level and magnesium level    Vitamin D deficiency  Assessment & Plan  Outpatient follow-up with PCP in regards to this matter      Lactic acidosis  Assessment & Plan  · Secondary to severe sepsis  · Resolved    VTE Pharmacologic Prophylaxis:   Pharmacologic: Enoxaparin (Lovenox)  Mechanical VTE Prophylaxis in Place: Yes    Patient Centered Rounds: I have performed bedside rounds with nursing staff today  Time Spent for Care: 20 minutes  More than 50% of total time spent on counseling and coordination of care as described above  Current Length of Stay: 7 day(s)    Current Patient Status: Inpatient   Certification Statement: The patient will continue to require additional inpatient hospital stay due to the need for IV methylprednisolone and for continuous NSS IV fluids  Code Status: Level 1 - Full Code      Subjective: The patient was seen and examined  The abdominal pain is improving  The patient is having less rectal bleeding at this time  Objective:     Vitals:   Temp (24hrs), Av 6 °F (37 °C), Min:98 4 °F (36 9 °C), Max:98 8 °F (37 1 °C)    Temp:  [98 4 °F (36 9 °C)-98 8 °F (37 1 °C)] 98 4 °F (36 9 °C)  HR:  [72-82] 72  Resp:  [18] 18  BP: ()/(65-80) 97/65  SpO2:  [94 %-99 %] 95 %  Body mass index is 22 45 kg/m²  Input and Output Summary (last 24 hours):        Intake/Output Summary (Last 24 hours) at 2019 1106  Last data filed at 2019 0830  Gross per 24 hour   Intake 4780 ml   Output 4150 ml   Net 630 ml       Physical Exam:     Physical Exam  General:  NAD, awake, alert, follows commands  HEENT:  NC/AT, mucous membranes moist  Neck:  Supple, No JVP elevation  CV:  + S1, + S2, RRR  Pulm:  Lung fields are CTA bilaterally  Abd: Soft, Mild generalized tenderness with palpation, Mild distension  Ext:  No clubbing/cyanosis/edema  Skin:  No rashes      Additional Data:    Labs:    Results from last 7 days   Lab Units 09/02/19  0545   WBC Thousand/uL 8 90   HEMOGLOBIN g/dL 13 2   HEMATOCRIT % 39 9   PLATELETS Thousands/uL 389   NEUTROS PCT % 86*   LYMPHS PCT % 12*   MONOS PCT % 2*   EOS PCT % 0     Results from last 7 days   Lab Units 09/02/19  0545   SODIUM mmol/L 140   POTASSIUM mmol/L 4 1   CHLORIDE mmol/L 105   CO2 mmol/L 28   BUN mg/dL 4*   CREATININE mg/dL 0 64   ANION GAP mmol/L 7   CALCIUM mg/dL 8 5   ALBUMIN g/dL 3 0*   TOTAL BILIRUBIN mg/dL 0 20   ALK PHOS U/L 55   ALT U/L 47   AST U/L 38   GLUCOSE RANDOM mg/dL 138     Results from last 7 days   Lab Units 08/26/19  1544   INR  0 91             Results from last 7 days   Lab Units 08/31/19  0454 08/30/19  0612 08/29/19  0623 08/28/19  0617 08/27/19  0512  08/26/19  1750 08/26/19  1544   LACTIC ACID mmol/L  --   --   --   --   --   --  1 6 2 3*   PROCALCITONIN ng/ml 0 18 0 32* 1 05* 2 11* 4 46*   < >  --  0 06    < > = values in this interval not displayed  * I Have Reviewed All Lab Data Listed Above  * Additional Pertinent Lab Tests Reviewed: [address] Admission Reviewed      Recent Cultures (last 7 days):     Results from last 7 days   Lab Units 08/29/19  0623 08/29/19  0615 08/27/19  1848 08/27/19  1644 08/26/19  1608 08/26/19  1544   BLOOD CULTURE  No Growth at 72 hrs  No Growth at 72 hrs   --   --  Citrobacter freundii* No Growth After 5 Days     GRAM STAIN RESULT   --   --   --   --  Gram negative rods*  --    URINE CULTURE   --   --   --  No Growth <1000 cfu/mL  --   --    C DIFF TOXIN B   --   --  NEGATIVE for C difficle toxin by PCR    --   --   --        Last 24 Hours Medication List:     Current Facility-Administered Medications:  acetaminophen 650 mg Oral Q6H PRN Gilberto Martinez, PA-C    butalbital-acetaminophen-caffeine 1 tablet Oral Q8H PRN Gilberto Juan, RANDEE    cetirizine 20 mg Oral BID Gilberto Juan, PA-DAYNA    diphenhydrAMINE 50 mg Intravenous Q4H PRN Gilbertoshailesh Martinez, PA-DAYNA    enoxaparin 40 mg Subcutaneous Q24H Gilberto Juan, PA-C    fluconazole 200 mg Oral Daily Gilbertoshailesh Martinez, PA-DAYNA    HYDROmorphone 0 2 mg Intravenous Q4H PRN Gilbertoshailesh Martinez, PA-DANYA    Or        HYDROmorphone 0 5 mg Intravenous Q4H PRN Gilbertoshailesh Martinez, PA-DAYNA    levofloxacin 750 mg Oral Q24H Gilberto Juan, PA-ADYNA    levothyroxine 50 mcg Oral Early Morning Gilbertoshailesh Martinez, PA-DAYNA    LORazepam 1 mg Oral Q8H PRN Gilberto Martinez, RANDEE    magnesium sulfate 2 g Intravenous Once Shana Blake DO    methylPREDNISolone sodium succinate 40 mg Intravenous Q12H [address] Gilberto Juan, RANDEE    montelukast 10 mg Oral BID Gilbertoshailesh Martinez, RANDEE    ondansetron 4 mg Intravenous Q6H PRN Gilberto Martinez, PA-DAYNA    pantoprazole 40 mg Oral BID AC Gilbertoshailesh Martinez, PA-DAYNA    ranitidine 300 mg Oral BID AC Gilberto Martinez, RANDEE    saccharomyces boulardii 250 mg Oral BID Gilberto Martinez, RANDEE    sodium chloride 150 mL/hr Intravenous Continuous Gilberto Martinez PA-C Last Rate: 150 mL/hr (09/02/19 0831)   temazepam 30 mg Oral HS Gilberto Martinez, RANDEE      Facility-Administered Medications Ordered in Other Encounters:  sodium chloride 125 mL/hr Intravenous Continuous Eunice Sampson CRNA        Today, Patient Was Seen By: Shana Blake DO    ** Please Note: Dictation voice to text software may have been used in the creation of this document   **

## 2019-09-02 NOTE — ASSESSMENT & PLAN NOTE
· Check an LDH level    US extremity soft tissue   Status: Final result   PACS Images      Show images for US extremity soft tissue   Study Result     LEFT AXILLARY ULTRASOUND     INDICATION:   R52: Pain, unspecified  M79 89: Other specified soft tissue disorders      COMPARISON:  Ultrasound from 9/11/2018, CT a chest, abdomen and pelvis 2/12/2019 and report of mammogram from 6/26/2019     TECHNIQUE:   Real-time ultrasound of the left axilla was performed with a linear transducer with both volumetric sweeps and still imaging techniques      FINDINGS:  There are no fluid collections or suspicious masses detected  There are a few left axillary lymph nodes  A slightly prominent lymph node measures about 1 4 cm short axis  The lymph nodes otherwise demonstrate normal intrinsic appearance  No skin thickening seen  IMPRESSION:     No fluid collections or suspicious masses detected      Left axillary lymph nodes, 1 of which is mildly prominent in size, however, all visualized lymph nodes demonstrate normal intrinsic morphology  This is of doubtful clinical significance  Follow-up if any may be based on clinical grounds       · Outpatient surveillance imaging with PCP

## 2019-09-02 NOTE — ASSESSMENT & PLAN NOTE
· The patient was initiated on methylprednisone 40 mg IV every 12 hours on 09/01/2019 with persistent abdominal pain and rectal bleeding  · On chronic Remicade treatment  · The patient was seen in consultation by Gastroenterology  · Continue NSS IV fluids  · The patient is at high risk for DVT/venous thromboembolism in the setting of chronic ulcerative colitis, so she will be placed on lovenox 40 mg SQ every 24 hours DVT prophylaxis even in the setting of rectal bleeding  · The patient underwent a colonoscopy on 08/30/2019 by Gastroenterology with the following results/impression:  IMPRESSION:  Moderately severe proctosigmoiditis -2 linear ulcers noted in the mid rectum  Biopsies were taken to rule out CMV  Random biopsies were taken from ascending, transverse, descending and sigmoid colon      RECOMMENDATION:  Await pathology  Preliminary recommendation repeat in 1 yr    Follow-up with Dr Yuki Almendarez current treatment

## 2019-09-03 PROBLEM — E83.39 HYPOPHOSPHATEMIA: Status: RESOLVED | Noted: 2017-08-26 | Resolved: 2019-09-03

## 2019-09-03 PROBLEM — E87.6 HYPOKALEMIA: Status: RESOLVED | Noted: 2017-08-25 | Resolved: 2019-09-03

## 2019-09-03 PROBLEM — E87.20 LACTIC ACIDOSIS: Status: RESOLVED | Noted: 2017-08-25 | Resolved: 2019-09-03

## 2019-09-03 PROBLEM — E87.2 LACTIC ACIDOSIS: Status: RESOLVED | Noted: 2017-08-25 | Resolved: 2019-09-03

## 2019-09-03 LAB
ALBUMIN SERPL BCP-MCNC: 2.9 G/DL (ref 3.5–5)
ALP SERPL-CCNC: 53 U/L (ref 46–116)
ALT SERPL W P-5'-P-CCNC: 59 U/L (ref 12–78)
ANION GAP SERPL CALCULATED.3IONS-SCNC: 11 MMOL/L (ref 4–13)
AST SERPL W P-5'-P-CCNC: 39 U/L (ref 5–45)
BACTERIA BLD CULT: NORMAL
BACTERIA BLD CULT: NORMAL
BASOPHILS # BLD AUTO: 0.01 THOUSANDS/ΜL (ref 0–0.1)
BASOPHILS NFR BLD AUTO: 0 % (ref 0–1)
BILIRUB SERPL-MCNC: 0.2 MG/DL (ref 0.2–1)
BUN SERPL-MCNC: 7 MG/DL (ref 5–25)
CALCIUM SERPL-MCNC: 8 MG/DL (ref 8.3–10.1)
CHLORIDE SERPL-SCNC: 106 MMOL/L (ref 100–108)
CO2 SERPL-SCNC: 24 MMOL/L (ref 21–32)
CREAT SERPL-MCNC: 0.66 MG/DL (ref 0.6–1.3)
EOSINOPHIL # BLD AUTO: 0 THOUSAND/ΜL (ref 0–0.61)
EOSINOPHIL NFR BLD AUTO: 0 % (ref 0–6)
ERYTHROCYTE [DISTWIDTH] IN BLOOD BY AUTOMATED COUNT: 12.7 % (ref 11.6–15.1)
GFR SERPL CREATININE-BSD FRML MDRD: 106 ML/MIN/1.73SQ M
GLUCOSE SERPL-MCNC: 129 MG/DL (ref 65–140)
HCT VFR BLD AUTO: 38.1 % (ref 34.8–46.1)
HGB BLD-MCNC: 12.6 G/DL (ref 11.5–15.4)
IMM GRANULOCYTES # BLD AUTO: 0.08 THOUSAND/UL (ref 0–0.2)
IMM GRANULOCYTES NFR BLD AUTO: 1 % (ref 0–2)
LDH SERPL-CCNC: 220 U/L (ref 81–234)
LYMPHOCYTES # BLD AUTO: 1.15 THOUSANDS/ΜL (ref 0.6–4.47)
LYMPHOCYTES NFR BLD AUTO: 10 % (ref 14–44)
MAGNESIUM SERPL-MCNC: 2 MG/DL (ref 1.6–2.6)
MCH RBC QN AUTO: 32.5 PG (ref 26.8–34.3)
MCHC RBC AUTO-ENTMCNC: 33.1 G/DL (ref 31.4–37.4)
MCV RBC AUTO: 98 FL (ref 82–98)
MONOCYTES # BLD AUTO: 0.48 THOUSAND/ΜL (ref 0.17–1.22)
MONOCYTES NFR BLD AUTO: 4 % (ref 4–12)
NEUTROPHILS # BLD AUTO: 10.41 THOUSANDS/ΜL (ref 1.85–7.62)
NEUTS SEG NFR BLD AUTO: 85 % (ref 43–75)
NRBC BLD AUTO-RTO: 0 /100 WBCS
PHOSPHATE SERPL-MCNC: 3.2 MG/DL (ref 2.7–4.5)
PLATELET # BLD AUTO: 401 THOUSANDS/UL (ref 149–390)
PMV BLD AUTO: 10.2 FL (ref 8.9–12.7)
POTASSIUM SERPL-SCNC: 3.8 MMOL/L (ref 3.5–5.3)
PROCALCITONIN SERPL-MCNC: <0.05 NG/ML
PROT SERPL-MCNC: 6.1 G/DL (ref 6.4–8.2)
RBC # BLD AUTO: 3.88 MILLION/UL (ref 3.81–5.12)
SODIUM SERPL-SCNC: 141 MMOL/L (ref 136–145)
WBC # BLD AUTO: 12.13 THOUSAND/UL (ref 4.31–10.16)

## 2019-09-03 PROCEDURE — 84145 PROCALCITONIN (PCT): CPT | Performed by: INTERNAL MEDICINE

## 2019-09-03 PROCEDURE — 99233 SBSQ HOSP IP/OBS HIGH 50: CPT | Performed by: INTERNAL MEDICINE

## 2019-09-03 PROCEDURE — 83615 LACTATE (LD) (LDH) ENZYME: CPT | Performed by: INTERNAL MEDICINE

## 2019-09-03 PROCEDURE — 84100 ASSAY OF PHOSPHORUS: CPT | Performed by: INTERNAL MEDICINE

## 2019-09-03 PROCEDURE — 80053 COMPREHEN METABOLIC PANEL: CPT | Performed by: INTERNAL MEDICINE

## 2019-09-03 PROCEDURE — 85025 COMPLETE CBC W/AUTO DIFF WBC: CPT | Performed by: INTERNAL MEDICINE

## 2019-09-03 PROCEDURE — 83735 ASSAY OF MAGNESIUM: CPT | Performed by: INTERNAL MEDICINE

## 2019-09-03 RX ADMIN — DIPHENHYDRAMINE HYDROCHLORIDE 50 MG: 50 INJECTION, SOLUTION INTRAMUSCULAR; INTRAVENOUS at 22:02

## 2019-09-03 RX ADMIN — SODIUM CHLORIDE 150 ML/HR: 0.9 INJECTION, SOLUTION INTRAVENOUS at 20:02

## 2019-09-03 RX ADMIN — MONTELUKAST 10 MG: 10 TABLET, FILM COATED ORAL at 17:47

## 2019-09-03 RX ADMIN — Medication 250 MG: at 17:47

## 2019-09-03 RX ADMIN — CETIRIZINE HYDROCHLORIDE 20 MG: 10 TABLET ORAL at 12:07

## 2019-09-03 RX ADMIN — METHYLPREDNISOLONE SODIUM SUCCINATE 40 MG: 40 INJECTION, POWDER, FOR SOLUTION INTRAMUSCULAR; INTRAVENOUS at 22:01

## 2019-09-03 RX ADMIN — HYDROMORPHONE HYDROCHLORIDE 0.5 MG: 1 INJECTION, SOLUTION INTRAMUSCULAR; INTRAVENOUS; SUBCUTANEOUS at 13:07

## 2019-09-03 RX ADMIN — SODIUM CHLORIDE 150 ML/HR: 0.9 INJECTION, SOLUTION INTRAVENOUS at 05:50

## 2019-09-03 RX ADMIN — CETIRIZINE HYDROCHLORIDE 20 MG: 10 TABLET ORAL at 17:47

## 2019-09-03 RX ADMIN — TEMAZEPAM 30 MG: 30 CAPSULE ORAL at 22:01

## 2019-09-03 RX ADMIN — Medication 250 MG: at 08:14

## 2019-09-03 RX ADMIN — RANITIDINE 300 MG: 150 TABLET ORAL at 16:52

## 2019-09-03 RX ADMIN — ENOXAPARIN SODIUM 40 MG: 40 INJECTION SUBCUTANEOUS at 08:15

## 2019-09-03 RX ADMIN — LEVOFLOXACIN 750 MG: 750 TABLET, FILM COATED ORAL at 08:14

## 2019-09-03 RX ADMIN — DIPHENHYDRAMINE HYDROCHLORIDE 50 MG: 50 INJECTION, SOLUTION INTRAMUSCULAR; INTRAVENOUS at 08:14

## 2019-09-03 RX ADMIN — RANITIDINE 300 MG: 150 TABLET ORAL at 12:08

## 2019-09-03 RX ADMIN — FLUCONAZOLE 200 MG: 40 POWDER, FOR SUSPENSION ORAL at 08:14

## 2019-09-03 RX ADMIN — MONTELUKAST 10 MG: 10 TABLET, FILM COATED ORAL at 12:07

## 2019-09-03 RX ADMIN — HYDROMORPHONE HYDROCHLORIDE 0.5 MG: 1 INJECTION, SOLUTION INTRAMUSCULAR; INTRAVENOUS; SUBCUTANEOUS at 22:01

## 2019-09-03 RX ADMIN — PANTOPRAZOLE SODIUM 40 MG: 40 TABLET, DELAYED RELEASE ORAL at 16:49

## 2019-09-03 RX ADMIN — DIPHENHYDRAMINE HYDROCHLORIDE 50 MG: 50 INJECTION, SOLUTION INTRAMUSCULAR; INTRAVENOUS at 17:47

## 2019-09-03 RX ADMIN — DIPHENHYDRAMINE HYDROCHLORIDE 50 MG: 50 INJECTION, SOLUTION INTRAMUSCULAR; INTRAVENOUS at 02:38

## 2019-09-03 RX ADMIN — METHYLPREDNISOLONE SODIUM SUCCINATE 40 MG: 40 INJECTION, POWDER, FOR SOLUTION INTRAMUSCULAR; INTRAVENOUS at 08:14

## 2019-09-03 RX ADMIN — HYDROMORPHONE HYDROCHLORIDE 0.5 MG: 1 INJECTION, SOLUTION INTRAMUSCULAR; INTRAVENOUS; SUBCUTANEOUS at 17:53

## 2019-09-03 RX ADMIN — LORAZEPAM 1 MG: 1 TABLET ORAL at 08:14

## 2019-09-03 RX ADMIN — PANTOPRAZOLE SODIUM 40 MG: 40 TABLET, DELAYED RELEASE ORAL at 12:08

## 2019-09-03 RX ADMIN — DIPHENHYDRAMINE HYDROCHLORIDE 50 MG: 50 INJECTION, SOLUTION INTRAMUSCULAR; INTRAVENOUS at 13:07

## 2019-09-03 RX ADMIN — SODIUM CHLORIDE 150 ML/HR: 0.9 INJECTION, SOLUTION INTRAVENOUS at 13:07

## 2019-09-03 NOTE — PROGRESS NOTES
[facility]  Progress Note - Lulu Snellen 53 y o  female MRN: [id number]    Unit/Bed#: 405-01 Encounter: [id number]    Assessment/Plan:  53y o  year old female with a PMH of mast cell activation syndrome, fibromyalgia and ulcerative colitis presented to the hospital for abdominal pain and chills and was found to have severe sepsis       1  Ulcerative colitis  2  Bacteremia  3  Severe sepsis  4  Abdominal pain  5  Rectal bleeding              -she has a history of ulcerative colitis and follows with Gastroenterology in [place], she is currently on Remicade every 6 weeks and had an infusion 1 5 weeks ago                 -She was found to have sepsis with elevated WBC, tachycardia and fever  She has had previous admissions for sepsis within the past year at this hospital and [facility]              -Blood cultures were positive for gram-negative rods  Her bacteremia could be secondary to translocation of bacteria from her colon verses a non GI cause  She has improved and has been transitioned to oral antibiotics              -VZ of the abdomen and pelvis without contrast showed circumferential thickening in the rectum without pneumatosis, bowel obstruction or free air in the abdomen seen               -C diff stool test was negative              -she had a colonoscopy on Friday showing moderate colitis, she was started on IV Solu-Medrol 40 mg Q 12 on Sunday and reports significant improvement in her symptoms    -She plans to follow up in the office with Dr Christi Banegas after discharge, she likely needs a different biologic as she had an infusion reaction with her last dose of Remicade and it also seems to be losing efficacy for her    -When discharge is appropriate, consider discharge home on oral steroids (such as Prednisone 40mg daily) with taper by 5mg weekly until she can be seen in the office      6   Difficulty swallowing   -EGD was performed on Friday with and mild mucosal ring seen in the esophagus, biopsies for eosinophilic esophagitis are pending    -she also reports that her mouth is very dry and she believes this is contributing to her difficulty swallowing, recommend soft/moist foods and frequent sips of liquids  -follow up on the results of the biopsies    Subjective:   She reports she is significantly improved, she had a formed bowel movement this morning and has had decreased abdominal and rectal pain  She has not seen any further rectal bleeding  She is tolerating her diet, she is finding it challenging with her dietary restrictions  She continues to have trouble swallowing and states that her mouth is very dry which she thinks is contributing to the problem  Objective:     Vitals: Blood pressure 112/67, pulse 70, temperature 98 7 °F (37 1 °C), resp  rate 18, height 5' 5" (1 651 m), weight 61 2 kg (134 lb 14 7 oz), SpO2 97 %, not currently breastfeeding  ,Body mass index is 22 45 kg/m²  Intake/Output Summary (Last 24 hours) at 9/3/2019 1223  Last data filed at 9/3/2019 0900  Gross per 24 hour   Intake 3420 ml   Output 800 ml   Net 2620 ml       Review of Systems: as per HPI  Review of Systems   Constitutional: Negative for activity change, appetite change, chills, fatigue, fever and unexpected weight change  HENT: Positive for trouble swallowing  Negative for mouth sores and sore throat  Respiratory: Negative for shortness of breath  Cardiovascular: Negative for chest pain  Gastrointestinal: Positive for abdominal pain (improved)  Negative for abdominal distention, blood in stool, constipation, diarrhea, nausea and vomiting  Skin: Negative for color change, pallor, rash and wound  Neurological: Negative for tremors and syncope  All other systems reviewed and are negative  Physical Exam:     Physical Exam   Constitutional: She is oriented to person, place, and time  She appears well-developed and well-nourished  No distress     HENT:   Head: Normocephalic and atraumatic  Eyes: Right eye exhibits no discharge  Left eye exhibits no discharge  No scleral icterus  Neck: Neck supple  No tracheal deviation present  Cardiovascular: Normal rate, regular rhythm and normal heart sounds  Exam reveals no gallop and no friction rub  No murmur heard  Pulmonary/Chest: Effort normal and breath sounds normal  No stridor  No respiratory distress  She has no wheezes  She has no rales  Abdominal: Soft  Bowel sounds are normal  She exhibits no distension and no mass  There is tenderness (much improved)  There is no rebound and no guarding  Neurological: She is alert and oriented to person, place, and time  Skin: Skin is warm and dry  Psychiatric: She has a normal mood and affect           Invasive Devices     Peripheral Intravenous Line            Peripheral IV 09/01/19 Right Forearm 1 day                        CBC:   Lab Results   Component Value Date    WBC 12 13 (H) 09/03/2019    HGB 12 6 09/03/2019    HCT 38 1 09/03/2019    MCV 98 09/03/2019     (H) 09/03/2019    MCH 32 5 09/03/2019    MCHC 33 1 09/03/2019    RDW 12 7 09/03/2019    MPV 10 2 09/03/2019    NRBC 0 09/03/2019   ,   CMP:   Lab Results   Component Value Date    K 3 8 09/03/2019     09/03/2019    CO2 24 09/03/2019    BUN 7 09/03/2019    CREATININE 0 66 09/03/2019    CALCIUM 8 0 (L) 09/03/2019    AST 39 09/03/2019    ALT 59 09/03/2019    ALKPHOS 53 09/03/2019    EGFR 106 09/03/2019   ,   Lipase: No results found for: LIPASE,  PT/INR: No results found for: PT, INR,   Troponin: No results found for: TROPONINI,   Magnesium: No components found for: MAG,   Phosphorous:   Lab Results   Component Value Date    PHOS 3 2 09/03/2019

## 2019-09-03 NOTE — PLAN OF CARE
Problem: Nutrition/Hydration-ADULT  Goal: Nutrient/Hydration intake appropriate for improving, restoring or maintaining nutritional needs  Description  Monitor and assess patient's nutrition/hydration status for malnutrition  Collaborate with interdisciplinary team and initiate plan and interventions as ordered  Monitor patient's weight and dietary intake as ordered or per policy  Utilize nutrition screening tool and intervene as necessary  Determine patient's food preferences and provide high-protein, high-caloric foods as appropriate       INTERVENTIONS:  - Monitor oral intake, urinary output, labs, and treatment plans  - Assess nutrition and hydration status and recommend course of action  - Evaluate amount of meals eaten  - Allow adequate time for meals  - Recommend/ encourage appropriate diets, oral nutritional supplements, and vitamin/mineral supplements  - Order, calculate, and assess calorie counts as needed  - Assess need for intravenous fluids  - Provide specific nutrition/hydration education as appropriate  - Include patient/family/caregiver in decisions related to nutrition   9/3/2019 1713 by Alonzo Wyatt RD  Outcome: Not Progressing  9/3/2019 1713 by Alonzo Wyatt RD  Outcome: Not Progressing

## 2019-09-04 RX ADMIN — SODIUM CHLORIDE 150 ML/HR: 0.9 INJECTION, SOLUTION INTRAVENOUS at 23:17

## 2019-09-04 RX ADMIN — MONTELUKAST 10 MG: 10 TABLET, FILM COATED ORAL at 13:57

## 2019-09-04 RX ADMIN — DIPHENHYDRAMINE HYDROCHLORIDE 50 MG: 50 INJECTION, SOLUTION INTRAMUSCULAR; INTRAVENOUS at 02:02

## 2019-09-04 RX ADMIN — ONDANSETRON 4 MG: 2 INJECTION INTRAMUSCULAR; INTRAVENOUS at 19:15

## 2019-09-04 RX ADMIN — LEVOFLOXACIN 750 MG: 750 TABLET, FILM COATED ORAL at 09:31

## 2019-09-04 RX ADMIN — DIPHENHYDRAMINE HYDROCHLORIDE 50 MG: 50 INJECTION, SOLUTION INTRAMUSCULAR; INTRAVENOUS at 06:28

## 2019-09-04 RX ADMIN — Medication 250 MG: at 09:31

## 2019-09-04 RX ADMIN — SODIUM CHLORIDE 150 ML/HR: 0.9 INJECTION, SOLUTION INTRAVENOUS at 09:32

## 2019-09-04 RX ADMIN — SODIUM CHLORIDE 150 ML/HR: 0.9 INJECTION, SOLUTION INTRAVENOUS at 02:26

## 2019-09-04 RX ADMIN — MONTELUKAST 10 MG: 10 TABLET, FILM COATED ORAL at 17:17

## 2019-09-04 RX ADMIN — HYDROMORPHONE HYDROCHLORIDE 0.5 MG: 1 INJECTION, SOLUTION INTRAMUSCULAR; INTRAVENOUS; SUBCUTANEOUS at 13:53

## 2019-09-04 RX ADMIN — FLUCONAZOLE 200 MG: 40 POWDER, FOR SUSPENSION ORAL at 09:31

## 2019-09-04 RX ADMIN — PANTOPRAZOLE SODIUM 40 MG: 40 TABLET, DELAYED RELEASE ORAL at 16:24

## 2019-09-04 RX ADMIN — RANITIDINE 300 MG: 150 TABLET ORAL at 16:24

## 2019-09-04 RX ADMIN — METHYLPREDNISOLONE SODIUM SUCCINATE 40 MG: 40 INJECTION, POWDER, FOR SOLUTION INTRAMUSCULAR; INTRAVENOUS at 09:31

## 2019-09-04 RX ADMIN — SODIUM CHLORIDE 150 ML/HR: 0.9 INJECTION, SOLUTION INTRAVENOUS at 15:49

## 2019-09-04 RX ADMIN — CETIRIZINE HYDROCHLORIDE 20 MG: 10 TABLET ORAL at 17:17

## 2019-09-04 RX ADMIN — DIPHENHYDRAMINE HYDROCHLORIDE 50 MG: 50 INJECTION, SOLUTION INTRAMUSCULAR; INTRAVENOUS at 19:15

## 2019-09-04 RX ADMIN — METHYLPREDNISOLONE SODIUM SUCCINATE 40 MG: 40 INJECTION, POWDER, FOR SOLUTION INTRAMUSCULAR; INTRAVENOUS at 21:16

## 2019-09-04 RX ADMIN — HYDROMORPHONE HYDROCHLORIDE 0.5 MG: 1 INJECTION, SOLUTION INTRAMUSCULAR; INTRAVENOUS; SUBCUTANEOUS at 19:15

## 2019-09-04 RX ADMIN — LORAZEPAM 1 MG: 1 TABLET ORAL at 02:00

## 2019-09-04 RX ADMIN — Medication 250 MG: at 17:17

## 2019-09-04 RX ADMIN — DIPHENHYDRAMINE HYDROCHLORIDE 50 MG: 50 INJECTION, SOLUTION INTRAMUSCULAR; INTRAVENOUS at 14:53

## 2019-09-04 RX ADMIN — DIPHENHYDRAMINE HYDROCHLORIDE 50 MG: 50 INJECTION, SOLUTION INTRAMUSCULAR; INTRAVENOUS at 23:16

## 2019-09-04 RX ADMIN — LORAZEPAM 1 MG: 1 TABLET ORAL at 14:55

## 2019-09-04 RX ADMIN — ENOXAPARIN SODIUM 40 MG: 40 INJECTION SUBCUTANEOUS at 10:46

## 2019-09-04 RX ADMIN — HYDROMORPHONE HYDROCHLORIDE 0.5 MG: 1 INJECTION, SOLUTION INTRAMUSCULAR; INTRAVENOUS; SUBCUTANEOUS at 02:27

## 2019-09-04 RX ADMIN — DIPHENHYDRAMINE HYDROCHLORIDE 50 MG: 50 INJECTION, SOLUTION INTRAMUSCULAR; INTRAVENOUS at 10:39

## 2019-09-04 RX ADMIN — TEMAZEPAM 30 MG: 30 CAPSULE ORAL at 21:16

## 2019-09-04 RX ADMIN — HYDROMORPHONE HYDROCHLORIDE 0.5 MG: 1 INJECTION, SOLUTION INTRAMUSCULAR; INTRAVENOUS; SUBCUTANEOUS at 09:42

## 2019-09-04 RX ADMIN — CETIRIZINE HYDROCHLORIDE 20 MG: 10 TABLET ORAL at 13:54

## 2019-09-04 RX ADMIN — HYDROMORPHONE HYDROCHLORIDE 0.5 MG: 1 INJECTION, SOLUTION INTRAMUSCULAR; INTRAVENOUS; SUBCUTANEOUS at 23:16

## 2019-09-04 RX ADMIN — PANTOPRAZOLE SODIUM 40 MG: 40 TABLET, DELAYED RELEASE ORAL at 10:48

## 2019-09-04 RX ADMIN — RANITIDINE 300 MG: 150 TABLET ORAL at 10:47

## 2019-09-04 NOTE — UTILIZATION REVIEW
Susannah Jonas RN   Utilization Review   Date of Service:  8/30/2019  2:33 PM      Signed          Continued Stay Review     Date: 8/30/19                      Current Patient Class: Inpatient       Current Level of Care: Med Surg     HPI:44 y o  female with history of ulcerative colitis on chronic Remicade,  initially admitted on 8/26/19 for treatment of severe sepsis secondary to gram-negative bacteremia  Patient was seen on Consult by Infectious Disease, will require 14 day antibiotic course, remains on IV cefepime  Will be transitioned to PO levofloxacin on 8/31  Patient was also evaluated by GastroenterologyI for BRB per rectum  Patient underwent EGD and colonoscopy today  Remains on IV fluids  Internal Medicine notes patient is at high risk for DVT/venous thromboembolism in the setting of chronic ulcerative colitis, placed on Lovenox SQ  Patient continues to experience generalized abdominal pain       8/30 Colonoscopy: Moderately severe proctosigmoiditis -2 linear ulcers noted in the mid rectum   Biopsies were taken to rule out CMV  Random biopsies were taken from ascending, transverse, descending and sigmoid colon  Recommendation: Await pathology, continue current treatment       8/30 EGD: Mild mucosal ringing in proximal and mid esophagus   Normal stomach and duodenum   Random biopsy taken from proximal and distal esophagus   Random biopsy taken from duodenum  Recommendation: follow up biopsy          Pertinent Labs/Diagnostic Results:             Results from last 7 days   Lab Units 08/30/19  0612 08/29/19  0623 08/28/19  0617 08/27/19  0512 08/26/19  2336 08/26/19  1544   WBC Thousand/uL 5 27 5 25 6 07 16 72*  --  17 33*   HEMOGLOBIN g/dL 13 8 14 0 13 1 12 6 13 1 14 3   HEMATOCRIT % 41 2 42 7 40 0 37 5 39 2 41 2   PLATELETS Thousands/uL 378 369 333 335 337 352   NEUTROS ABS Thousands/µL  --  2 18 2 58  --   --  15 47*                   Results from last 7 days   Lab Units 08/30/19  0612 08/29/19  1382 08/28/19  0617 08/27/19  0512 08/26/19  1544   SODIUM mmol/L 139 140 138 142 137   POTASSIUM mmol/L 4 0 4 1 4 4 3 3* 3 7   CHLORIDE mmol/L 105 105 107 107 102   CO2 mmol/L 28 28 25 26 22   ANION GAP mmol/L 6 7 6 9 13   BUN mg/dL 3* 4* 4* 7 18   CREATININE mg/dL 0 68 0 84 0 73 0 62 1 06   EGFR ml/min/1 73sq m 105 84 99 108 63   CALCIUM mg/dL 8 2* 8 4 8 3 7 8* 9 3   CALCIUM, IONIZED mmol/L  --   --  1 17  --   --    MAGNESIUM mg/dL 1 8 2 0 2 1 1 9  --    PHOSPHORUS mg/dL  --  3 2 3 0 2 4*  --               Results from last 7 days   Lab Units 08/30/19  0612 08/29/19  0623 08/28/19  0617 08/26/19  1544   AST U/L 20 21 22 33   ALT U/L 26 22 23 25   ALK PHOS U/L 51 46 43* 56   TOTAL PROTEIN g/dL 6 5 6 7 6 7 7 6   ALBUMIN g/dL 3 2* 3 2* 3 3* 3 9   TOTAL BILIRUBIN mg/dL 0 30 0 30 0 30 0 40                   Results from last 7 days   Lab Units 08/30/19  0612 08/29/19  0623 08/28/19  0617 08/27/19  0512 08/26/19  1544   GLUCOSE RANDOM mg/dL 119 83 84 78 94              Results from last 7 days   Lab Units 08/26/19  1544   PROTIME seconds 12 2   INR   0 91   PTT seconds 24                   Results from last 7 days   Lab Units 08/30/19  0612 08/29/19  0623 08/28/19  0617 08/27/19  0512 08/27/19  0037   PROCALCITONIN ng/ml 0 32* 1 05* 2 11* 4 46* 4 29*            Results from last 7 days   Lab Units 08/26/19  1750 08/26/19  1544   LACTIC ACID mmol/L 1 6 2 3*                  Results from last 7 days   Lab Units 08/26/19  1544   LIPASE u/L 117              Results from last 7 days   Lab Units 08/26/19  1650   CLARITY UA   Clear   COLOR UA   Yellow   SPEC GRAV UA   1 020   PH UA   6 0   GLUCOSE UA mg/dl Negative   KETONES UA mg/dl Negative   BLOOD UA   Trace-Intact*   PROTEIN UA mg/dl Negative   NITRITE UA   Negative   BILIRUBIN UA   Negative   UROBILINOGEN UA E U /dl 0 2   LEUKOCYTES UA   Negative   WBC UA /hpf 0-1*   RBC UA /hpf 1-2*   BACTERIA UA /hpf None Seen   EPITHELIAL CELLS WET PREP /hpf Occasional     Results from last 7 days   Lab Units 08/27/19  1848   C DIFF TOXIN B   NEGATIVE for C difficle toxin by PCR             Results from last 7 days   Lab Units 08/28/19  1515   SALMONELLA SP PCR   None Detected   SHIGELLA SP/ENTEROINVASIVE E  COLI (EIEC)   None Detected   CAMPYLOBACTER SP (JEJUNI AND COLI)   None Detected   SHIGA TOXIN 1/SHIGA TOXIN 2   None Detected                   Results from last 7 days   Lab Units 08/29/19  0623 08/29/19  0615 08/27/19  1644 08/26/19  1608 08/26/19  1544   BLOOD CULTURE   No Growth at 24 hrs  No Growth at 24 hrs   --  Citrobacter freundii* No Growth at 72 hrs     GRAM STAIN RESULT    --   --   --  Gram negative rods*  --    URINE CULTURE    --   --  No Growth <1000 cfu/mL  --   --          Vital Signs:      Date and Time Temp Pulse SpO2 Resp BP   08/30/19 1416 -- -- -- -- --   08/30/19 1341 98 9 °F (37 2 °C) 65 98 % 16 114/74   08/30/19 1305 -- 68 98 % 13 111/74   08/30/19 1300 97 4 °F (36 3 °C) 60 100 % 13 116/79   08/30/19 1250 -- 61 100 % 15 119/78   08/30/19 1245 -- 62 100 % 14 119/80   08/30/19 1240 -- 67 100 % 15 116/77   08/30/19 1238 97 5 °F (36 4 °C) 74 100 % 14 116/73   08/30/19 1007 -- -- -- -- --   08/30/19 0833 -- -- -- -- --   08/30/19 0748 97 1 °F (36 2 °C) 85 97 % 16 113/56         Medications:   Scheduled Meds:   Current Facility-Administered Medications:  acetaminophen 650 mg Oral Q6H PRN   butalbital-acetaminophen-caffeine 1 tablet Oral Q8H PRN   cetirizine 20 mg Oral BID   diphenhydrAMINE 50 mg Intravenous Q4H PRN   enoxaparin 40 mg Subcutaneous Q24H   fluconazole 200 mg Oral Daily   HYDROmorphone 0 2 mg Intravenous Q4H PRN   Or         HYDROmorphone 0 5 mg Intravenous Q4H PRN x 5 doses 8/29, 3 doses 8/30   levothyroxine 50 mcg Oral Early Morning   LORazepam 1 mg Oral Q8H PRN   montelukast 10 mg Oral BID   ondansetron 4 mg Intravenous Q6H PRN   pantoprazole 40 mg Oral BID AC   predniSONE 50 mg Oral Daily   ranitidine 300 mg Oral Q12H [address]   saccharomyces boulardii 250 mg Oral BID   sodium chloride 150 mL/hr Intravenous Continuous   temazepam 30 mg Oral HS         cefepime (MAXIPIME) 2,000 mg in dextrose 5 % 50 mL IVPB   Dose: 2,000 mg  Freq: Every 12 hours Route: IV  Last Dose: 2,000 mg (08/30/19 1357)  Start: 08/27/19 1200     sodium chloride 0 9 % infusion   Rate: 150 mL/hr Dose: 150 mL/hr  Freq: Continuous Route: IV  Indications of Use: IV Hydration  Last Dose: 150 mL/hr (08/30/19 1345)  Start: 08/28/19 1330        Discharge Plan: [facility]     Network Utilization Review Department  Phone: [phone]; Fax [phone]  [email or web address]  ATTENTION: Please call with any questions or concerns to [phone]  and carefully listen to the prompts so that you are directed to the right person  Send all requests for admission clinical reviews, approved or denied determinations and any other requests to fax [phone]  All voicemails are confidential                 Landy Glaser RN   Utilization Review   Date of Service:  9/1/2019  1:51 PM      Signed           Continued Stay Review     Date: 9/1/19                       Current Patient Class: Inpatient                               Current Level of Care: Med/surg  HPI:44 y o  female initially admitted on 8/26 for severe sepsis       Assessment/Plan:   Progress 9/1:  Initiate methylprednisone 40 mg IV every 12 hours on 09/01/2019 with persistent abdominal pain and rectal bleeding  She continues with generalized abdominal pain, and rectal bleeding  Complains of nausea with food intake  Continue IV fluids  Progress 8/31: follow procalcitonin  Per ID, she will require 14 day antibiotic coarse  She is in an immunosuppressed state due to IVIG treatment and remicade  Continues to require IV antibiotics      Pertinent Labs/Diagnostic Results:            Results from last 7 days   Lab Units 09/01/19  0830 08/31/19  0454 08/30/19  0612 08/29/19  0623 08/28/19  0617   WBC Thousand/uL 6 43 9  39 5 27 5 25 6 07   HEMOGLOBIN g/dL 14 1 12 3 13 8 14 0 13 1   HEMATOCRIT % 43 4 37 6 41 2 42 7 40 0   PLATELETS Thousands/uL 372 367 378 369 333   NEUTROS ABS Thousands/µL 1 88 5 90  --  2 18 2 58                    Results from last 7 days   Lab Units 09/01/19  0830 08/31/19  0454 08/30/19  0612 08/29/19  0623 08/28/19  0617 08/27/19  0512   SODIUM mmol/L 138 142 139 140 138 142   POTASSIUM mmol/L 3 6 3 7 4 0 4 1 4 4 3 3*   CHLORIDE mmol/L 105 108 105 105 107 107   CO2 mmol/L 26 26 28 28 25 26   ANION GAP mmol/L 7 8 6 7 6 9   BUN mg/dL 2* 4* 3* 4* 4* 7   CREATININE mg/dL 0 81 0 69 0 68 0 84 0 73 0 62   EGFR ml/min/1 73sq m 87 105 105 84 99 108   CALCIUM mg/dL 8 7 8 0* 8 2* 8 4 8 3 7 8*   CALCIUM, IONIZED mmol/L  --   --   --   --  1 17  --    MAGNESIUM mg/dL 1 9 1 9 1 8 2 0 2 1 1 9   PHOSPHORUS mg/dL 2 5* 1 9*  --  3 2 3 0 2 4*               Results from last 7 days   Lab Units 09/01/19  0830 08/31/19  0454 08/30/19  0612 08/29/19  0623 08/28/19  0617   AST U/L 25 10 20 21 22   ALT U/L 15 17 26 22 23   ALK PHOS U/L 52 44* 51 46 43*   TOTAL PROTEIN g/dL 7 0 5 9* 6 5 6 7 6 7   ALBUMIN g/dL 3 4* 2 8* 3 2* 3 2* 3 3*   TOTAL BILIRUBIN mg/dL 0 20 0 20 0 30 0 30 0 30                     Results from last 7 days   Lab Units 09/01/19  0830 08/31/19  0454 08/30/19  0612 08/29/19  0623 08/28/19  0617 08/27/19  0512 08/26/19  1544   GLUCOSE RANDOM mg/dL 88 109 119 83 84 78 94               Results from last 7 days   Lab Units 08/26/19  1544   PROTIME seconds 12 2   INR   0 91   PTT seconds 24                   Results from last 7 days   Lab Units 08/31/19  0454 08/30/19  0612 08/29/19  0623 08/28/19  0617 08/27/19  0512   PROCALCITONIN ng/ml 0 18 0 32* 1 05* 2 11* 4 46*            Results from last 7 days   Lab Units 08/26/19  1750 08/26/19  1544   LACTIC ACID mmol/L 1 6 2 3*           Results from last 7 days   Lab Units 08/26/19  1544   LIPASE u/L 117                   Results from last 7 days   Lab Units 08/26/19  1650 CLARITY UA   Clear   COLOR UA   Yellow   SPEC GRAV UA   1 020   PH UA   6 0   GLUCOSE UA mg/dl Negative   KETONES UA mg/dl Negative   BLOOD UA   Trace-Intact*   PROTEIN UA mg/dl Negative   NITRITE UA   Negative   BILIRUBIN UA   Negative   UROBILINOGEN UA E U /dl 0 2   LEUKOCYTES UA   Negative   WBC UA /hpf 0-1*   RBC UA /hpf 1-2*   BACTERIA UA /hpf None Seen   EPITHELIAL CELLS WET PREP /hpf Occasional                       Results from last 7 days   Lab Units 08/27/19  1848   C DIFF TOXIN B   NEGATIVE for C difficle toxin by PCR             Results from last 7 days   Lab Units 08/28/19  1515   SALMONELLA SP PCR   None Detected   SHIGELLA SP/ENTEROINVASIVE E  COLI (EIEC)   None Detected   CAMPYLOBACTER SP (JEJUNI AND COLI)   None Detected   SHIGA TOXIN 1/SHIGA TOXIN 2   None Detected                   Results from last 7 days   Lab Units 08/29/19  0623 08/29/19  0615 08/27/19  1644 08/26/19  1608 08/26/19  1544   BLOOD CULTURE   No Growth at 72 hrs  No Growth at 72 hrs   --  Citrobacter freundii* No Growth After 5 Days     GRAM STAIN RESULT    --   --   --  Gram negative rods*  --    URINE CULTURE    --   --  No Growth <1000 cfu/mL  --   --           Vital Signs:   /Time   Temp   Pulse   Resp   BP   MAP (mmHg)   SpO2   O2 Device   Patient Position - Orthostatic VS   09/01/19 0748   97 2 °F (36 2 °C)Abnormal    71   16   105/70   80   100 %   None (Room air)   Lying   08/31/19 2301   98 6 °F (37 °C)   68   16   95/59   --   97 %   None (Room air)   Lying   08/31/19 1642   98 2 °F (36 8 °C)   90   16   104/70   80   97 %   None (Room air)   Lying   08/31/19 1400   98 4 °F (36 9 °C)   74   17   108/70   84   97 %   None (Room air)   Lying   08/31/19 0801   97 2 °F (36 2 °C)Abnormal                                      Medications:   Scheduled Meds:   Current Facility-Administered Medications:  acetaminophen 650 mg Oral Q6H PRN   butalbital-acetaminophen-caffeine 1 tablet Oral Q8H PRN   cetirizine 20 mg Oral BID diphenhydrAMINE 50 mg Intravenous Q4H PRN   enoxaparin 40 mg Subcutaneous Q24H   fluconazole 200 mg Oral Daily   HYDROmorphone 0 2 mg Intravenous Q4H PRN   Or         HYDROmorphone 0 5 mg Intravenous Q4H PRN   levofloxacin 750 mg Oral Q24H   levothyroxine 50 mcg Oral Early Morning   LORazepam 1 mg Oral Q8H PRN   methylPREDNISolone sodium succinate 40 mg Intravenous Q12H KARISSA   montelukast 10 mg Oral BID   ondansetron 4 mg Intravenous Q6H PRN   pantoprazole 40 mg Oral BID AC   potassium phosphate 12 mmol Intravenous Once   ranitidine 300 mg Oral Q12H [address]   saccharomyces boulardii 250 mg Oral BID   sodium chloride 150 mL/hr Intravenous Continuous   temazepam 30 mg Oral HS      Facility-Administered Medications Ordered in Other Encounters:  sodium chloride 125 mL/hr Intravenous Continuous Eunice Sampson CRNA         Discharge Plan: TBD  Network Utilization Review Department  Phone: [phone]; Fax [phone]  [email or web address]  ATTENTION: Please call with any questions or concerns to [phone]  and carefully listen to the prompts so that you are directed to the right person  Send all requests for admission clinical reviews, approved or denied determinations and any other requests to fax [phone]   All voicemails are confidential

## 2019-09-04 NOTE — PLAN OF CARE
Problem: Potential for Falls  Goal: Patient will remain free of falls  Description  INTERVENTIONS:  - Assess patient frequently for physical needs  -  Identify cognitive and physical deficits and behaviors that affect risk of falls  -  [place] fall precautions as indicated by assessment  Medium fall risk   - Educate patient/family on patient safety including physical limitations  - Instruct patient to call for assistance with activity based on assessment  - Modify environment to reduce risk of injury  - Consider OT/PT consult to assist with strengthening/mobility   Outcome: Progressing     Problem: Nutrition/Hydration-ADULT  Goal: Nutrient/Hydration intake appropriate for improving, restoring or maintaining nutritional needs  Description  Monitor and assess patient's nutrition/hydration status for malnutrition  Collaborate with interdisciplinary team and initiate plan and interventions as ordered  Monitor patient's weight and dietary intake as ordered or per policy  Utilize nutrition screening tool and intervene as necessary  Determine patient's food preferences and provide high-protein, high-caloric foods as appropriate       INTERVENTIONS:  - Monitor oral intake, urinary output, labs, and treatment plans  - Assess nutrition and hydration status and recommend course of action  - Evaluate amount of meals eaten  - Allow adequate time for meals  - Recommend/ encourage appropriate diets, oral nutritional supplements, and vitamin/mineral supplements  - Order, calculate, and assess calorie counts as needed  - Assess need for intravenous fluids  - Provide specific nutrition/hydration education as appropriate  - Include patient/family/caregiver in decisions related to nutrition   Outcome: Progressing     Problem: PAIN - ADULT  Goal: Verbalizes/displays adequate comfort level or baseline comfort level  Description  Interventions:  - Encourage patient to monitor pain and request assistance  - Assess pain using appropriate pain scale; 0-10 pain scale  - Administer analgesics based on type and severity of pain and evaluate response  - Implement non-pharmacological measures as appropriate and evaluate response  - Consider cultural and social influences on pain and pain management  - Notify physician/advanced practitioner if interventions unsuccessful or patient reports new pain   Outcome: Progressing     Problem: INFECTION - ADULT  Goal: Absence or prevention of progression during hospitalization  Description  INTERVENTIONS:  - Assess and monitor for signs and symptoms of infection  - Monitor lab/diagnostic results  - Monitor all insertion sites, i e  indwelling lines, tubes, and drains  - Administer medications as ordered  - Instruct and encourage patient and family to use good hand hygiene technique   Outcome: Progressing     Problem: SAFETY ADULT  Goal: Maintain or return to baseline ADL function  Description  INTERVENTIONS:  -  Assess patient's ability to carry out ADLs; independent  - Assess patient's mobility; independent  - Encourage maximum independence but intervene and supervise when necessary  - Involve family in performance of ADLs  - Assess for home care needs following discharge   - Consider OT consult to assist with ADL evaluation and planning for discharge  - Provide patient education as appropriate   Outcome: Progressing  Goal: Maintain or return mobility status to optimal level  Description  INTERVENTIONS:  - Assess patient's baseline mobility status -Independent  - Identify cognitive and physical deficits and behaviors that affect mobility  - [place] fall precautions as indicated by assessment   Medium fall risk   - Record patient progress and toleration of activity level on Mobility SBAR; progress patient to next Phase/Stage  - Instruct patient to call for assistance with activity based on assessment  - Consider rehabilitation consult to assist with strengthening/weightbearing, etc    Outcome: Progressing     Problem: DISCHARGE PLANNING  Goal: Discharge to home or other facility with appropriate resources  Description  INTERVENTIONS:  - Identify barriers to discharge w/patient and caregiver  - Arrange for needed discharge resources and transportation as appropriate  - Identify discharge learning needs (meds, wound care, etc )  - Refer to Case Management Department for coordinating discharge planning if the patient needs post-hospital services based on physician/advanced practitioner order or complex needs related to functional status, cognitive ability, or social support system   Outcome: Progressing     Problem: Knowledge Deficit  Goal: Patient/family/caregiver demonstrates understanding of disease process, treatment plan, medications, and discharge instructions  Description  Complete learning assessment and assess knowledge base    Interventions:  - Provide teaching at level of understanding  - Provide teaching via preferred learning methods  Outcome: Progressing     Problem: GASTROINTESTINAL - ADULT  Goal: Minimal or absence of nausea and/or vomiting  Description  INTERVENTIONS:  - Administer IV fluids if ordered to ensure adequate hydration  - Maintain NPO status until nausea and vomiting are resolved  - Administer ordered antiemetic medications as needed  - Provide nonpharmacologic comfort measures as appropriate  - Advance diet as tolerated, if ordered  - Consider nutrition services referral to assist patient with adequate nutrition and appropriate food choices   Outcome: Progressing  Goal: Maintains or returns to baseline bowel function  Description  INTERVENTIONS:  - Assess bowel function  - Encourage oral fluids to ensure adequate hydration  - Administer IV fluids if ordered to ensure adequate hydration  - Administer ordered medications as needed  - Encourage mobilization and activity  - Consider nutritional services referral to assist patient with adequate nutrition and appropriate food choices  Outcome: Progressing  Goal: Maintains adequate nutritional intake  Description  INTERVENTIONS:  - Monitor percentage of each meal consumed  - Identify factors contributing to decreased intake, treat as appropriate  - Assist with meals as needed  - Monitor I&O, weight, and lab values if indicated  - Obtain nutrition services referral as needed  Outcome: Progressing     Problem: METABOLIC, FLUID AND ELECTROLYTES - ADULT  Goal: Electrolytes maintained within normal limits  Description  INTERVENTIONS:  - Monitor labs and assess patient for signs and symptoms of electrolyte imbalances  - Administer electrolyte replacement as ordered  - Monitor response to electrolyte replacements, including repeat lab results as appropriate  - Instruct patient on fluid and nutrition as appropriate  Outcome: Progressing  Goal: Fluid balance maintained  Description  INTERVENTIONS:  - Monitor labs   - Monitor I/O and WT  - Instruct patient on fluid and nutrition as appropriate  - Assess for signs & symptoms of volume excess or deficit  Outcome: Progressing     Problem: DISCHARGE PLANNING - CARE MANAGEMENT  Goal: Discharge to post-acute care or home with appropriate resources  Description  INTERVENTIONS:  - Conduct assessment to determine patient/family and health care team treatment goals, and need for post-acute services based on payer coverage, community resources, and patient preferences, and barriers to discharge  - Address psychosocial, clinical, and financial barriers to discharge as identified in assessment in conjunction with the patient/family and health care team  - Arrange appropriate level of post-acute services according to patient's   needs and preference and payer coverage in collaboration with the physician and health care team  - Communicate with and update the patient/family, physician, and health care team regarding progress on the discharge plan  - Arrange appropriate transportation to post-acute venues  Pt will return home with       Outcome: Progressing

## 2019-09-04 NOTE — ASSESSMENT & PLAN NOTE
· The patient is seen regularly at [facility]  · Continue her medication regimen per [facility]

## 2019-09-04 NOTE — ASSESSMENT & PLAN NOTE
· Severe sepsis was present on admission and secondary to gram-negative bacteremia  · The patient was initially treated with IV cefepime  · The patient was seen in consultation by Infectious Disease  · The lactic acidosis has resolved  · The patient was transitioned to levofloxacin 750 mg PO every 24 hours on 08/31/2019  · The patient completed a 14-day antibiotic course during the hospitalization per Infectious Disease's recommendations

## 2019-09-04 NOTE — ASSESSMENT & PLAN NOTE
· Decrease methylprednisolone to 40 mg IV every 12 hours  · On chronic Remicade treatment  · The patient was seen in consultation by Gastroenterology  · Continue NSS IV fluids  · The patient is at high risk for DVT/venous thromboembolism in the setting of chronic ulcerative colitis, so she will be placed on lovenox 40 mg SQ every 24 hours DVT prophylaxis even in the setting of rectal bleeding  · The patient underwent a colonoscopy on 08/30/2019 by Gastroenterology with the following results/impression:  IMPRESSION:  Moderately severe proctosigmoiditis -2 linear ulcers noted in the mid rectum  Biopsies were taken to rule out CMV  Random biopsies were taken from ascending, transverse, descending and sigmoid colon      RECOMMENDATION:  Await pathology  Preliminary recommendation repeat in 1 yr    Follow-up with Dr Yuki Almendarez current treatment

## 2019-09-05 LAB
ALBUMIN SERPL BCP-MCNC: 3 G/DL (ref 3.5–5)
ALP SERPL-CCNC: 53 U/L (ref 46–116)
ALT SERPL W P-5'-P-CCNC: 54 U/L (ref 12–78)
ANION GAP SERPL CALCULATED.3IONS-SCNC: 9 MMOL/L (ref 4–13)
AST SERPL W P-5'-P-CCNC: 21 U/L (ref 5–45)
BASOPHILS # BLD AUTO: 0.01 THOUSANDS/ΜL (ref 0–0.1)
BASOPHILS NFR BLD AUTO: 0 % (ref 0–1)
BILIRUB SERPL-MCNC: 0.2 MG/DL (ref 0.2–1)
BUN SERPL-MCNC: 10 MG/DL (ref 5–25)
CALCIUM SERPL-MCNC: 8.7 MG/DL (ref 8.3–10.1)
CHLORIDE SERPL-SCNC: 104 MMOL/L (ref 100–108)
CO2 SERPL-SCNC: 27 MMOL/L (ref 21–32)
CREAT SERPL-MCNC: 0.73 MG/DL (ref 0.6–1.3)
CRP SERPL QL: <3 MG/L
EOSINOPHIL # BLD AUTO: 0 THOUSAND/ΜL (ref 0–0.61)
EOSINOPHIL NFR BLD AUTO: 0 % (ref 0–6)
ERYTHROCYTE [DISTWIDTH] IN BLOOD BY AUTOMATED COUNT: 12.8 % (ref 11.6–15.1)
GFR SERPL CREATININE-BSD FRML MDRD: 99 ML/MIN/1.73SQ M
GLUCOSE SERPL-MCNC: 161 MG/DL (ref 65–140)
HCT VFR BLD AUTO: 39.9 % (ref 34.8–46.1)
HGB BLD-MCNC: 13.2 G/DL (ref 11.5–15.4)
IMM GRANULOCYTES # BLD AUTO: 0.08 THOUSAND/UL (ref 0–0.2)
IMM GRANULOCYTES NFR BLD AUTO: 1 % (ref 0–2)
LYMPHOCYTES # BLD AUTO: 1.17 THOUSANDS/ΜL (ref 0.6–4.47)
LYMPHOCYTES NFR BLD AUTO: 11 % (ref 14–44)
MAGNESIUM SERPL-MCNC: 1.9 MG/DL (ref 1.6–2.6)
MCH RBC QN AUTO: 32.2 PG (ref 26.8–34.3)
MCHC RBC AUTO-ENTMCNC: 33.1 G/DL (ref 31.4–37.4)
MCV RBC AUTO: 97 FL (ref 82–98)
MONOCYTES # BLD AUTO: 0.53 THOUSAND/ΜL (ref 0.17–1.22)
MONOCYTES NFR BLD AUTO: 5 % (ref 4–12)
NEUTROPHILS # BLD AUTO: 8.94 THOUSANDS/ΜL (ref 1.85–7.62)
NEUTS SEG NFR BLD AUTO: 83 % (ref 43–75)
NRBC BLD AUTO-RTO: 0 /100 WBCS
O+P STL CONC: NORMAL
PHOSPHATE SERPL-MCNC: 3.2 MG/DL (ref 2.7–4.5)
PLATELET # BLD AUTO: 402 THOUSANDS/UL (ref 149–390)
PMV BLD AUTO: 9.9 FL (ref 8.9–12.7)
POTASSIUM SERPL-SCNC: 3.9 MMOL/L (ref 3.5–5.3)
PROT SERPL-MCNC: 6.4 G/DL (ref 6.4–8.2)
RBC # BLD AUTO: 4.1 MILLION/UL (ref 3.81–5.12)
SODIUM SERPL-SCNC: 140 MMOL/L (ref 136–145)
T3FREE SERPL-MCNC: 1.12 PG/ML (ref 2.3–4.2)
T4 FREE SERPL-MCNC: 0.73 NG/DL (ref 0.76–1.46)
TSH SERPL DL<=0.05 MIU/L-ACNC: 0.21 UIU/ML (ref 0.36–3.74)
WBC # BLD AUTO: 10.73 THOUSAND/UL (ref 4.31–10.16)

## 2019-09-05 PROCEDURE — 85025 COMPLETE CBC W/AUTO DIFF WBC: CPT | Performed by: FAMILY MEDICINE

## 2019-09-05 PROCEDURE — 83735 ASSAY OF MAGNESIUM: CPT | Performed by: FAMILY MEDICINE

## 2019-09-05 PROCEDURE — 84439 ASSAY OF FREE THYROXINE: CPT | Performed by: FAMILY MEDICINE

## 2019-09-05 PROCEDURE — 81270 JAK2 GENE: CPT | Performed by: FAMILY MEDICINE

## 2019-09-05 PROCEDURE — 86140 C-REACTIVE PROTEIN: CPT | Performed by: FAMILY MEDICINE

## 2019-09-05 PROCEDURE — 84100 ASSAY OF PHOSPHORUS: CPT | Performed by: FAMILY MEDICINE

## 2019-09-05 PROCEDURE — 84481 FREE ASSAY (FT-3): CPT | Performed by: FAMILY MEDICINE

## 2019-09-05 PROCEDURE — 84443 ASSAY THYROID STIM HORMONE: CPT | Performed by: FAMILY MEDICINE

## 2019-09-05 PROCEDURE — 80053 COMPREHEN METABOLIC PANEL: CPT | Performed by: FAMILY MEDICINE

## 2019-09-05 RX ORDER — HYDROMORPHONE HCL/PF 1 MG/ML
0.5 SYRINGE (ML) INJECTION ONCE
Status: COMPLETED | OUTPATIENT
Start: 2019-09-05 | End: 2019-09-05

## 2019-09-05 RX ORDER — LIDOCAINE 50 MG/G
2 PATCH TOPICAL DAILY
Status: DISCONTINUED | OUTPATIENT
Start: 2019-09-05 | End: 2019-09-12 | Stop reason: HOSPADM

## 2019-09-05 RX ADMIN — SODIUM CHLORIDE 150 ML/HR: 0.9 INJECTION, SOLUTION INTRAVENOUS at 05:31

## 2019-09-05 RX ADMIN — SODIUM CHLORIDE 150 ML/HR: 0.9 INJECTION, SOLUTION INTRAVENOUS at 11:58

## 2019-09-05 RX ADMIN — Medication 250 MG: at 08:22

## 2019-09-05 RX ADMIN — CETIRIZINE HYDROCHLORIDE 20 MG: 10 TABLET ORAL at 12:51

## 2019-09-05 RX ADMIN — FLUCONAZOLE 200 MG: 40 POWDER, FOR SUSPENSION ORAL at 08:21

## 2019-09-05 RX ADMIN — HYDROMORPHONE HYDROCHLORIDE 0.5 MG: 1 INJECTION, SOLUTION INTRAMUSCULAR; INTRAVENOUS; SUBCUTANEOUS at 10:18

## 2019-09-05 RX ADMIN — LEVOFLOXACIN 750 MG: 750 TABLET, FILM COATED ORAL at 08:22

## 2019-09-05 RX ADMIN — PANTOPRAZOLE SODIUM 40 MG: 40 TABLET, DELAYED RELEASE ORAL at 11:59

## 2019-09-05 RX ADMIN — DIPHENHYDRAMINE HYDROCHLORIDE 50 MG: 50 INJECTION, SOLUTION INTRAMUSCULAR; INTRAVENOUS at 16:58

## 2019-09-05 RX ADMIN — MONTELUKAST 10 MG: 10 TABLET, FILM COATED ORAL at 12:51

## 2019-09-05 RX ADMIN — DIPHENHYDRAMINE HYDROCHLORIDE 50 MG: 50 INJECTION, SOLUTION INTRAMUSCULAR; INTRAVENOUS at 21:03

## 2019-09-05 RX ADMIN — DIPHENHYDRAMINE HYDROCHLORIDE 50 MG: 50 INJECTION, SOLUTION INTRAMUSCULAR; INTRAVENOUS at 03:29

## 2019-09-05 RX ADMIN — HYDROMORPHONE HYDROCHLORIDE 0.5 MG: 1 INJECTION, SOLUTION INTRAMUSCULAR; INTRAVENOUS; SUBCUTANEOUS at 20:43

## 2019-09-05 RX ADMIN — MONTELUKAST 10 MG: 10 TABLET, FILM COATED ORAL at 17:49

## 2019-09-05 RX ADMIN — DIPHENHYDRAMINE HYDROCHLORIDE 50 MG: 50 INJECTION, SOLUTION INTRAMUSCULAR; INTRAVENOUS at 08:22

## 2019-09-05 RX ADMIN — HYDROMORPHONE HYDROCHLORIDE 0.5 MG: 1 INJECTION, SOLUTION INTRAMUSCULAR; INTRAVENOUS; SUBCUTANEOUS at 14:20

## 2019-09-05 RX ADMIN — METHYLPREDNISOLONE SODIUM SUCCINATE 40 MG: 40 INJECTION, POWDER, FOR SOLUTION INTRAMUSCULAR; INTRAVENOUS at 21:03

## 2019-09-05 RX ADMIN — RANITIDINE 300 MG: 150 TABLET ORAL at 16:56

## 2019-09-05 RX ADMIN — METHYLPREDNISOLONE SODIUM SUCCINATE 40 MG: 40 INJECTION, POWDER, FOR SOLUTION INTRAMUSCULAR; INTRAVENOUS at 08:22

## 2019-09-05 RX ADMIN — SODIUM CHLORIDE 150 ML/HR: 0.9 INJECTION, SOLUTION INTRAVENOUS at 18:23

## 2019-09-05 RX ADMIN — DIPHENHYDRAMINE HYDROCHLORIDE 50 MG: 50 INJECTION, SOLUTION INTRAMUSCULAR; INTRAVENOUS at 12:51

## 2019-09-05 RX ADMIN — HYDROMORPHONE HYDROCHLORIDE 0.5 MG: 1 INJECTION, SOLUTION INTRAMUSCULAR; INTRAVENOUS; SUBCUTANEOUS at 04:14

## 2019-09-05 RX ADMIN — PANTOPRAZOLE SODIUM 40 MG: 40 TABLET, DELAYED RELEASE ORAL at 16:56

## 2019-09-05 RX ADMIN — HYDROMORPHONE HYDROCHLORIDE 0.5 MG: 1 INJECTION, SOLUTION INTRAMUSCULAR; INTRAVENOUS; SUBCUTANEOUS at 16:02

## 2019-09-05 RX ADMIN — LORAZEPAM 1 MG: 1 TABLET ORAL at 14:20

## 2019-09-05 RX ADMIN — LIDOCAINE 2 PATCH: 50 PATCH TOPICAL at 16:02

## 2019-09-05 RX ADMIN — ENOXAPARIN SODIUM 40 MG: 40 INJECTION SUBCUTANEOUS at 08:27

## 2019-09-05 RX ADMIN — CETIRIZINE HYDROCHLORIDE 20 MG: 10 TABLET ORAL at 17:49

## 2019-09-05 RX ADMIN — RANITIDINE 300 MG: 150 TABLET ORAL at 11:59

## 2019-09-05 RX ADMIN — ONDANSETRON 4 MG: 2 INJECTION INTRAMUSCULAR; INTRAVENOUS at 10:21

## 2019-09-05 RX ADMIN — TEMAZEPAM 30 MG: 30 CAPSULE ORAL at 21:03

## 2019-09-05 NOTE — PLAN OF CARE
Problem: Potential for Falls  Goal: Patient will remain free of falls  Description  INTERVENTIONS:  - Assess patient frequently for physical needs  -  Identify cognitive and physical deficits and behaviors that affect risk of falls  -  [place] fall precautions as indicated by assessment  Medium fall risk   - Educate patient/family on patient safety including physical limitations  - Instruct patient to call for assistance with activity based on assessment  - Modify environment to reduce risk of injury  - Consider OT/PT consult to assist with strengthening/mobility   Outcome: Progressing     Problem: Nutrition/Hydration-ADULT  Goal: Nutrient/Hydration intake appropriate for improving, restoring or maintaining nutritional needs  Description  Monitor and assess patient's nutrition/hydration status for malnutrition  Collaborate with interdisciplinary team and initiate plan and interventions as ordered  Monitor patient's weight and dietary intake as ordered or per policy  Utilize nutrition screening tool and intervene as necessary  Determine patient's food preferences and provide high-protein, high-caloric foods as appropriate       INTERVENTIONS:  - Monitor oral intake, urinary output, labs, and treatment plans  - Assess nutrition and hydration status and recommend course of action  - Evaluate amount of meals eaten  - Allow adequate time for meals  - Recommend/ encourage appropriate diets, oral nutritional supplements, and vitamin/mineral supplements  - Order, calculate, and assess calorie counts as needed  - Assess need for intravenous fluids  - Provide specific nutrition/hydration education as appropriate  - Include patient/family/caregiver in decisions related to nutrition   Outcome: Progressing     Problem: PAIN - ADULT  Goal: Verbalizes/displays adequate comfort level or baseline comfort level  Description  Interventions:  - Encourage patient to monitor pain and request assistance  - Assess pain using appropriate pain scale; 0-10 pain scale  - Administer analgesics based on type and severity of pain and evaluate response  - Implement non-pharmacological measures as appropriate and evaluate response  - Consider cultural and social influences on pain and pain management  - Notify physician/advanced practitioner if interventions unsuccessful or patient reports new pain   Outcome: Progressing     Problem: INFECTION - ADULT  Goal: Absence or prevention of progression during hospitalization  Description  INTERVENTIONS:  - Assess and monitor for signs and symptoms of infection  - Monitor lab/diagnostic results  - Monitor all insertion sites, i e  indwelling lines, tubes, and drains  - Administer medications as ordered  - Instruct and encourage patient and family to use good hand hygiene technique   Outcome: Progressing     Problem: SAFETY ADULT  Goal: Maintain or return to baseline ADL function  Description  INTERVENTIONS:  -  Assess patient's ability to carry out ADLs; independent  - Assess patient's mobility; independent  - Encourage maximum independence but intervene and supervise when necessary  - Involve family in performance of ADLs  - Assess for home care needs following discharge   - Consider OT consult to assist with ADL evaluation and planning for discharge  - Provide patient education as appropriate   Outcome: Progressing  Goal: Maintain or return mobility status to optimal level  Description  INTERVENTIONS:  - Assess patient's baseline mobility status -Independent  - Identify cognitive and physical deficits and behaviors that affect mobility  - [place] fall precautions as indicated by assessment   Medium fall risk   - Record patient progress and toleration of activity level on Mobility SBAR; progress patient to next Phase/Stage  - Instruct patient to call for assistance with activity based on assessment  - Consider rehabilitation consult to assist with strengthening/weightbearing, etc    Outcome: Progressing     Problem: DISCHARGE PLANNING  Goal: Discharge to home or other facility with appropriate resources  Description  INTERVENTIONS:  - Identify barriers to discharge w/patient and caregiver  - Arrange for needed discharge resources and transportation as appropriate  - Identify discharge learning needs (meds, wound care, etc )  - Refer to Case Management Department for coordinating discharge planning if the patient needs post-hospital services based on physician/advanced practitioner order or complex needs related to functional status, cognitive ability, or social support system   Outcome: Progressing     Problem: Knowledge Deficit  Goal: Patient/family/caregiver demonstrates understanding of disease process, treatment plan, medications, and discharge instructions  Description  Complete learning assessment and assess knowledge base    Interventions:  - Provide teaching at level of understanding  - Provide teaching via preferred learning methods  Outcome: Progressing     Problem: GASTROINTESTINAL - ADULT  Goal: Minimal or absence of nausea and/or vomiting  Description  INTERVENTIONS:  - Administer IV fluids if ordered to ensure adequate hydration  - Maintain NPO status until nausea and vomiting are resolved  - Administer ordered antiemetic medications as needed  - Provide nonpharmacologic comfort measures as appropriate  - Advance diet as tolerated, if ordered  - Consider nutrition services referral to assist patient with adequate nutrition and appropriate food choices   Outcome: Progressing  Goal: Maintains or returns to baseline bowel function  Description  INTERVENTIONS:  - Assess bowel function  - Encourage oral fluids to ensure adequate hydration  - Administer IV fluids if ordered to ensure adequate hydration  - Administer ordered medications as needed  - Encourage mobilization and activity  - Consider nutritional services referral to assist patient with adequate nutrition and appropriate food choices  Outcome: Progressing  Goal: Maintains adequate nutritional intake  Description  INTERVENTIONS:  - Monitor percentage of each meal consumed  - Identify factors contributing to decreased intake, treat as appropriate  - Assist with meals as needed  - Monitor I&O, weight, and lab values if indicated  - Obtain nutrition services referral as needed  Outcome: Progressing     Problem: METABOLIC, FLUID AND ELECTROLYTES - ADULT  Goal: Electrolytes maintained within normal limits  Description  INTERVENTIONS:  - Monitor labs and assess patient for signs and symptoms of electrolyte imbalances  - Administer electrolyte replacement as ordered  - Monitor response to electrolyte replacements, including repeat lab results as appropriate  - Instruct patient on fluid and nutrition as appropriate  Outcome: Progressing  Goal: Fluid balance maintained  Description  INTERVENTIONS:  - Monitor labs   - Monitor I/O and WT  - Instruct patient on fluid and nutrition as appropriate  - Assess for signs & symptoms of volume excess or deficit  Outcome: Progressing     Problem: DISCHARGE PLANNING - CARE MANAGEMENT  Goal: Discharge to post-acute care or home with appropriate resources  Description  INTERVENTIONS:  - Conduct assessment to determine patient/family and health care team treatment goals, and need for post-acute services based on payer coverage, community resources, and patient preferences, and barriers to discharge  - Address psychosocial, clinical, and financial barriers to discharge as identified in assessment in conjunction with the patient/family and health care team  - Arrange appropriate level of post-acute services according to patient's   needs and preference and payer coverage in collaboration with the physician and health care team  - Communicate with and update the patient/family, physician, and health care team regarding progress on the discharge plan  - Arrange appropriate transportation to post-acute venues  Pt will return home with       Outcome: Progressing

## 2019-09-06 LAB
ALBUMIN SERPL BCP-MCNC: 3 G/DL (ref 3.5–5)
ALP SERPL-CCNC: 45 U/L (ref 46–116)
ALT SERPL W P-5'-P-CCNC: 57 U/L (ref 12–78)
ANION GAP SERPL CALCULATED.3IONS-SCNC: 5 MMOL/L (ref 4–13)
AST SERPL W P-5'-P-CCNC: 24 U/L (ref 5–45)
BASOPHILS # BLD AUTO: 0.01 THOUSANDS/ΜL (ref 0–0.1)
BASOPHILS NFR BLD AUTO: 0 % (ref 0–1)
BILIRUB SERPL-MCNC: 0.2 MG/DL (ref 0.2–1)
BUN SERPL-MCNC: 10 MG/DL (ref 5–25)
CALCIUM SERPL-MCNC: 8.5 MG/DL (ref 8.3–10.1)
CHLORIDE SERPL-SCNC: 103 MMOL/L (ref 100–108)
CO2 SERPL-SCNC: 27 MMOL/L (ref 21–32)
CREAT SERPL-MCNC: 0.67 MG/DL (ref 0.6–1.3)
EOSINOPHIL # BLD AUTO: 0 THOUSAND/ΜL (ref 0–0.61)
EOSINOPHIL NFR BLD AUTO: 0 % (ref 0–6)
ERYTHROCYTE [DISTWIDTH] IN BLOOD BY AUTOMATED COUNT: 12.6 % (ref 11.6–15.1)
GFR SERPL CREATININE-BSD FRML MDRD: 106 ML/MIN/1.73SQ M
GLUCOSE SERPL-MCNC: 113 MG/DL (ref 65–140)
HCT VFR BLD AUTO: 39.9 % (ref 34.8–46.1)
HGB BLD-MCNC: 13.4 G/DL (ref 11.5–15.4)
IMM GRANULOCYTES # BLD AUTO: 0.06 THOUSAND/UL (ref 0–0.2)
IMM GRANULOCYTES NFR BLD AUTO: 1 % (ref 0–2)
LYMPHOCYTES # BLD AUTO: 1.41 THOUSANDS/ΜL (ref 0.6–4.47)
LYMPHOCYTES NFR BLD AUTO: 14 % (ref 14–44)
MAGNESIUM SERPL-MCNC: 2 MG/DL (ref 1.6–2.6)
MCH RBC QN AUTO: 32.5 PG (ref 26.8–34.3)
MCHC RBC AUTO-ENTMCNC: 33.6 G/DL (ref 31.4–37.4)
MCV RBC AUTO: 97 FL (ref 82–98)
MONOCYTES # BLD AUTO: 0.58 THOUSAND/ΜL (ref 0.17–1.22)
MONOCYTES NFR BLD AUTO: 6 % (ref 4–12)
NEUTROPHILS # BLD AUTO: 7.84 THOUSANDS/ΜL (ref 1.85–7.62)
NEUTS SEG NFR BLD AUTO: 79 % (ref 43–75)
NRBC BLD AUTO-RTO: 0 /100 WBCS
PHOSPHATE SERPL-MCNC: 4.6 MG/DL (ref 2.7–4.5)
PLATELET # BLD AUTO: 390 THOUSANDS/UL (ref 149–390)
PMV BLD AUTO: 9.4 FL (ref 8.9–12.7)
POTASSIUM SERPL-SCNC: 3.8 MMOL/L (ref 3.5–5.3)
PROT SERPL-MCNC: 6.2 G/DL (ref 6.4–8.2)
RBC # BLD AUTO: 4.12 MILLION/UL (ref 3.81–5.12)
SODIUM SERPL-SCNC: 135 MMOL/L (ref 136–145)
WBC # BLD AUTO: 9.9 THOUSAND/UL (ref 4.31–10.16)

## 2019-09-06 PROCEDURE — 84100 ASSAY OF PHOSPHORUS: CPT | Performed by: FAMILY MEDICINE

## 2019-09-06 PROCEDURE — 80053 COMPREHEN METABOLIC PANEL: CPT | Performed by: FAMILY MEDICINE

## 2019-09-06 PROCEDURE — 99232 SBSQ HOSP IP/OBS MODERATE 35: CPT | Performed by: PHYSICIAN ASSISTANT

## 2019-09-06 PROCEDURE — 83735 ASSAY OF MAGNESIUM: CPT | Performed by: FAMILY MEDICINE

## 2019-09-06 PROCEDURE — 85025 COMPLETE CBC W/AUTO DIFF WBC: CPT | Performed by: FAMILY MEDICINE

## 2019-09-06 RX ORDER — METHYLPREDNISOLONE SODIUM SUCCINATE 40 MG/ML
40 INJECTION, POWDER, LYOPHILIZED, FOR SOLUTION INTRAMUSCULAR; INTRAVENOUS EVERY 8 HOURS SCHEDULED
Status: DISCONTINUED | OUTPATIENT
Start: 2019-09-06 | End: 2019-09-06

## 2019-09-06 RX ORDER — SACCHAROMYCES BOULARDII 250 MG
250 CAPSULE ORAL 2 TIMES DAILY
Status: DISCONTINUED | OUTPATIENT
Start: 2019-09-06 | End: 2019-09-12 | Stop reason: HOSPADM

## 2019-09-06 RX ORDER — HYDROMORPHONE HCL/PF 1 MG/ML
1 SYRINGE (ML) INJECTION ONCE
Status: COMPLETED | OUTPATIENT
Start: 2019-09-06 | End: 2019-09-06

## 2019-09-06 RX ORDER — HYDROMORPHONE HCL/PF 1 MG/ML
0.5 SYRINGE (ML) INJECTION ONCE
Status: DISCONTINUED | OUTPATIENT
Start: 2019-09-06 | End: 2019-09-06 | Stop reason: SDUPTHER

## 2019-09-06 RX ORDER — METHYLPREDNISOLONE SODIUM SUCCINATE 40 MG/ML
20 INJECTION, POWDER, LYOPHILIZED, FOR SOLUTION INTRAMUSCULAR; INTRAVENOUS EVERY 8 HOURS SCHEDULED
Status: DISCONTINUED | OUTPATIENT
Start: 2019-09-06 | End: 2019-09-07

## 2019-09-06 RX ADMIN — TEMAZEPAM 30 MG: 30 CAPSULE ORAL at 21:16

## 2019-09-06 RX ADMIN — PANTOPRAZOLE SODIUM 40 MG: 40 TABLET, DELAYED RELEASE ORAL at 17:57

## 2019-09-06 RX ADMIN — RANITIDINE 300 MG: 150 TABLET ORAL at 11:51

## 2019-09-06 RX ADMIN — LIDOCAINE 2 PATCH: 50 PATCH TOPICAL at 09:27

## 2019-09-06 RX ADMIN — ENOXAPARIN SODIUM 40 MG: 40 INJECTION SUBCUTANEOUS at 11:50

## 2019-09-06 RX ADMIN — DIPHENHYDRAMINE HYDROCHLORIDE 50 MG: 50 INJECTION, SOLUTION INTRAMUSCULAR; INTRAVENOUS at 20:30

## 2019-09-06 RX ADMIN — DIPHENHYDRAMINE HYDROCHLORIDE 50 MG: 50 INJECTION, SOLUTION INTRAMUSCULAR; INTRAVENOUS at 15:57

## 2019-09-06 RX ADMIN — HYDROMORPHONE HYDROCHLORIDE 0.5 MG: 1 INJECTION, SOLUTION INTRAMUSCULAR; INTRAVENOUS; SUBCUTANEOUS at 11:47

## 2019-09-06 RX ADMIN — SODIUM CHLORIDE 150 ML/HR: 0.9 INJECTION, SOLUTION INTRAVENOUS at 21:16

## 2019-09-06 RX ADMIN — SODIUM CHLORIDE 150 ML/HR: 0.9 INJECTION, SOLUTION INTRAVENOUS at 14:24

## 2019-09-06 RX ADMIN — CETIRIZINE HYDROCHLORIDE 20 MG: 10 TABLET ORAL at 15:56

## 2019-09-06 RX ADMIN — RANITIDINE 300 MG: 150 TABLET ORAL at 17:59

## 2019-09-06 RX ADMIN — METHYLPREDNISOLONE SODIUM SUCCINATE 20 MG: 40 INJECTION, POWDER, FOR SOLUTION INTRAMUSCULAR; INTRAVENOUS at 17:54

## 2019-09-06 RX ADMIN — PANTOPRAZOLE SODIUM 40 MG: 40 TABLET, DELAYED RELEASE ORAL at 11:51

## 2019-09-06 RX ADMIN — LORAZEPAM 1 MG: 1 TABLET ORAL at 15:56

## 2019-09-06 RX ADMIN — SODIUM CHLORIDE 150 ML/HR: 0.9 INJECTION, SOLUTION INTRAVENOUS at 01:06

## 2019-09-06 RX ADMIN — DIPHENHYDRAMINE HYDROCHLORIDE 50 MG: 50 INJECTION, SOLUTION INTRAMUSCULAR; INTRAVENOUS at 01:04

## 2019-09-06 RX ADMIN — DIPHENHYDRAMINE HYDROCHLORIDE 50 MG: 50 INJECTION, SOLUTION INTRAMUSCULAR; INTRAVENOUS at 11:47

## 2019-09-06 RX ADMIN — Medication 250 MG: at 17:54

## 2019-09-06 RX ADMIN — CETIRIZINE HYDROCHLORIDE 20 MG: 10 TABLET ORAL at 18:44

## 2019-09-06 RX ADMIN — FLUCONAZOLE 200 MG: 40 POWDER, FOR SUSPENSION ORAL at 09:26

## 2019-09-06 RX ADMIN — HYDROMORPHONE HYDROCHLORIDE 0.5 MG: 1 INJECTION, SOLUTION INTRAMUSCULAR; INTRAVENOUS; SUBCUTANEOUS at 15:56

## 2019-09-06 RX ADMIN — HYDROMORPHONE HYDROCHLORIDE 0.5 MG: 1 INJECTION, SOLUTION INTRAMUSCULAR; INTRAVENOUS; SUBCUTANEOUS at 01:05

## 2019-09-06 RX ADMIN — Medication 250 MG: at 09:27

## 2019-09-06 RX ADMIN — SODIUM CHLORIDE 150 ML/HR: 0.9 INJECTION, SOLUTION INTRAVENOUS at 07:39

## 2019-09-06 RX ADMIN — HYDROMORPHONE HYDROCHLORIDE 1 MG: 1 INJECTION, SOLUTION INTRAMUSCULAR; INTRAVENOUS; SUBCUTANEOUS at 14:21

## 2019-09-06 RX ADMIN — METHYLPREDNISOLONE SODIUM SUCCINATE 40 MG: 40 INJECTION, POWDER, FOR SOLUTION INTRAMUSCULAR; INTRAVENOUS at 09:26

## 2019-09-06 RX ADMIN — HYDROMORPHONE HYDROCHLORIDE 0.5 MG: 1 INJECTION, SOLUTION INTRAMUSCULAR; INTRAVENOUS; SUBCUTANEOUS at 07:39

## 2019-09-06 RX ADMIN — MONTELUKAST 10 MG: 10 TABLET, FILM COATED ORAL at 18:44

## 2019-09-06 RX ADMIN — HYDROMORPHONE HYDROCHLORIDE 0.5 MG: 1 INJECTION, SOLUTION INTRAMUSCULAR; INTRAVENOUS; SUBCUTANEOUS at 20:30

## 2019-09-06 RX ADMIN — MONTELUKAST 10 MG: 10 TABLET, FILM COATED ORAL at 15:56

## 2019-09-06 RX ADMIN — METHYLNALTREXONE BROMIDE 8 MG: 12 INJECTION, SOLUTION SUBCUTANEOUS at 17:55

## 2019-09-06 RX ADMIN — DIPHENHYDRAMINE HYDROCHLORIDE 50 MG: 50 INJECTION, SOLUTION INTRAMUSCULAR; INTRAVENOUS at 07:38

## 2019-09-06 RX ADMIN — LEVOFLOXACIN 750 MG: 750 TABLET, FILM COATED ORAL at 07:39

## 2019-09-06 NOTE — PROGRESS NOTES
[facility]  Progress Note - Ronald May 53 y o  female MRN: [id number]    Unit/Bed#: 405-01 Encounter: [id number]    Assessment/Plan:  53y o  year old female with a PMH of mast cell activation syndrome, fibromyalgia and ulcerative colitis presented to the hospital for abdominal pain and chills and was found to have severe sepsis       1  Ulcerative colitis  2  Bacteremia  3  Severe sepsis  4  Abdominal pain  5  Rectal bleeding              -she has a history of ulcerative colitis and follows with Gastroenterology in [facility], she is currently on Remicade every 6 weeks and had an infusion 1 5 weeks ago                 -on admission, she was found to have sepsis with elevated WBC, tachycardia and fever  She has improved and is now transitioned to oral antibiotics              -PO of the abdomen and pelvis without contrast on admission showed circumferential thickening in the rectum without pneumatosis, bowel obstruction or free air in the abdomen seen               -C diff stool test was negative              -she had a colonoscopy on Friday showing moderate colitis, she was started on IV Solu-Medrol 40 mg Q 12 on Sunday and reports significant improvement in her symptoms    -today, she is having an acute episode of pain in her abdomen  Initially she states this is in the right lower quadrant and then she states that it is in the right upper quadrant  She is in the fetal position, sobbing, and refuses physical examination during my assessment    -She denies any diarrhea or rectal bleeding to suggest worsening of UC    -discussed the case with Dr Antwan Min and Dr Gil Stubbs  There may be an element of anxiety contributing to her pain  She has had similar episodes in the past   She does not have a fever or leukocytosis, her blood pressure and her pulse are normal   Confirmed this, her nurse retook vitals while we were in the room                -Given her severe pain, suggest one-time dose of Dilauded 0 5mg  If her pain does not improve, would recommend repeating CT scan abd/pelvis to evaluate for acute abnormality in the abdomen    -she requests that her SCDs be removed; I advised her of the risk of DVT and she states she understands but would like them removed anyway  Removed SCDs        6  Difficulty swallowing              -EGD was performed on Friday with and mild mucosal ring seen in the esophagus, biopsies for eosinophilic esophagitis were negative  -recommend continuing with soft foods and frequent sips of water  Subjective:   She is sobbing and in the fetal position complaining of pain in her abdomen  Initially she states that is in the right lower quadrant and then she states that is in the right upper quadrant  She denies any diarrhea or change in her bowel movements  She denies any hematochezia  She states the pain episode began yesterday and then recurred this morning  She is requesting pain medications  She is unable to have morphine due to her mast cell activation syndrome  Objective:     Vitals: Blood pressure 125/72, pulse 94, temperature 99 1 °F (37 3 °C), resp  rate 22, height 5' 5" (1 651 m), weight 60 2 kg (132 lb 12 8 oz), SpO2 95 %, not currently breastfeeding  ,Body mass index is 22 1 kg/m²  Intake/Output Summary (Last 24 hours) at 9/6/2019 1445  Last data filed at 9/6/2019 1424  Gross per 24 hour   Intake 4447 5 ml   Output 3400 ml   Net 1047 5 ml       Review of Systems: as per HPI  Review of Systems   Constitutional: Positive for appetite change  Negative for activity change, chills, fatigue, fever and unexpected weight change  HENT: Negative for mouth sores, sore throat and trouble swallowing  Respiratory: Negative for shortness of breath  Cardiovascular: Negative for chest pain  Gastrointestinal: Positive for abdominal pain  Negative for abdominal distention, blood in stool, constipation, diarrhea, nausea and vomiting     Skin: Negative for color change, pallor, rash and wound  Neurological: Negative for tremors and syncope  Psychiatric/Behavioral: The patient is nervous/anxious  All other systems reviewed and are negative  Physical Exam:     Physical Exam   Constitutional: She is oriented to person, place, and time  Sobbing, in fetal position   HENT:   Head: Normocephalic and atraumatic  Eyes: Right eye exhibits no discharge  Left eye exhibits no discharge  No scleral icterus  Neck:   Patient refused physical exam   Cardiovascular:   Patient refused physical exam   Pulmonary/Chest:   Patient refused physical exam   Abdominal:   Patient refused physical exam   Musculoskeletal:   Patient refused physical exam   Neurological: She is alert and oriented to person, place, and time     Skin:   Patient refused physical exam   Psychiatric:   Anxious, sobbing         Invasive Devices     Peripheral Intravenous Line            Peripheral IV 09/05/19 Left Arm less than 1 day                        CBC:   Lab Results   Component Value Date    WBC 9 90 09/06/2019    HGB 13 4 09/06/2019    HCT 39 9 09/06/2019    MCV 97 09/06/2019     09/06/2019    MCH 32 5 09/06/2019    MCHC 33 6 09/06/2019    RDW 12 6 09/06/2019    MPV 9 4 09/06/2019    NRBC 0 09/06/2019   ,   CMP:   Lab Results   Component Value Date    K 3 8 09/06/2019     09/06/2019    CO2 27 09/06/2019    BUN 10 09/06/2019    CREATININE 0 67 09/06/2019    CALCIUM 8 5 09/06/2019    AST 24 09/06/2019    ALT 57 09/06/2019    ALKPHOS 45 (L) 09/06/2019    EGFR 106 09/06/2019   ,   Lipase: No results found for: LIPASE,  PT/INR: No results found for: PT, INR,   Troponin: No results found for: TROPONINI,   Magnesium: No components found for: MAG,   Phosphorous:   Lab Results   Component Value Date    PHOS 4 6 (H) 09/06/2019

## 2019-09-06 NOTE — SOCIAL WORK
Continuing to follow pt's medical status and pt was discussed in interdisciplinary team meeting  Pt still complaining of pain  GI to see pt again today  Pt not medically ready for dc at this time  Plans are home on dc with outpatient follow up when medically ready  CM will continue to follow

## 2019-09-06 NOTE — PLAN OF CARE
Problem: Nutrition/Hydration-ADULT  Goal: Nutrient/Hydration intake appropriate for improving, restoring or maintaining nutritional needs  Description  Monitor and assess patient's nutrition/hydration status for malnutrition  Collaborate with interdisciplinary team and initiate plan and interventions as ordered  Monitor patient's weight and dietary intake as ordered or per policy  Utilize nutrition screening tool and intervene as necessary  Determine patient's food preferences and provide high-protein, high-caloric foods as appropriate       INTERVENTIONS:  - Monitor oral intake, urinary output, labs, and treatment plans  - Assess nutrition and hydration status and recommend course of action  - Evaluate amount of meals eaten  - Allow adequate time for meals  - Recommend/ encourage appropriate diets, oral nutritional supplements, and vitamin/mineral supplements  - Order, calculate, and assess calorie counts as needed  - Assess need for intravenous fluids  - Provide specific nutrition/hydration education as appropriate  - Include patient/family/caregiver in decisions related to nutrition   Outcome: Not Progressing

## 2019-09-06 NOTE — NURSING NOTE
Pt complaint of no relief from pain medication given at 1147am  Dr Marcy Mcintosh made aware and waiting feedback  Will continue to monitor

## 2019-09-06 NOTE — UTILIZATION REVIEW
Continued Stay Review    Date:    9/6/2019                          Current Patient Class:     IP  Current Level of Care:    MED SURG    HPI:44 y o  female initially admitted on    8/26/2019   With severe sepsis    Assessment/Plan:    Need to cont  IP acute  LOC  D/T   Cont  IV  S/medrol,  req   Frequent  IV  Dilaudid        Pertinent Labs/Diagnostic Results:   Results from last 7 days   Lab Units 09/06/19  0652 09/05/19  0428 09/03/19  0547 09/02/19  0545 09/01/19  0830   WBC Thousand/uL 9 90 10 73* 12 13* 8 90 6 43   HEMOGLOBIN g/dL 13 4 13 2 12 6 13 2 14 1   HEMATOCRIT % 39 9 39 9 38 1 39 9 43 4   PLATELETS Thousands/uL 390 402* 401* 389 372   NEUTROS ABS Thousands/µL 7 84* 8 94* 10 41* 7 60 1 88         Results from last 7 days   Lab Units 09/06/19  0652 09/05/19  0428 09/03/19  0547 09/02/19  0545 09/01/19  0830   SODIUM mmol/L 135* 140 141 140 138   POTASSIUM mmol/L 3 8 3 9 3 8 4 1 3 6   CHLORIDE mmol/L 103 104 106 105 105   CO2 mmol/L 27 27 24 28 26   ANION GAP mmol/L 5 9 11 7 7   BUN mg/dL 10 10 7 4* 2*   CREATININE mg/dL 0 67 0 73 0 66 0 64 0 81   EGFR ml/min/1 73sq m 106 99 106 107 87   CALCIUM mg/dL 8 5 8 7 8 0* 8 5 8 7   MAGNESIUM mg/dL 2 0 1 9 2 0 1 7 1 9   PHOSPHORUS mg/dL 4 6* 3 2 3 2 3 5 2 5*     Results from last 7 days   Lab Units 09/06/19  0652 09/05/19  0428 09/03/19  0547 09/02/19  0545 09/01/19  0830   AST U/L 24 21 39 38 25   ALT U/L 57 54 59 47 15   ALK PHOS U/L 45* 53 53 55 52   TOTAL PROTEIN g/dL 6 2* 6 4 6 1* 6 6 7 0   ALBUMIN g/dL 3 0* 3 0* 2 9* 3 0* 3 4*   TOTAL BILIRUBIN mg/dL 0 20 0 20 0 20 0 20 0 20         Results from last 7 days   Lab Units 09/06/19  0652 09/05/19  0428 09/03/19  0547 09/02/19  0545 09/01/19  0830 08/31/19  0454   GLUCOSE RANDOM mg/dL 113 161* 129 138 88 109           Results from last 7 days   Lab Units 09/05/19  0427   TSH 3RD GENERATON uIU/mL 0 214*     Results from last 7 days   Lab Units 09/03/19  0547 09/02/19  0545 08/31/19  0454   PROCALCITONIN ng/ml <0 05 <0 05 0 18           Results from last 7 days   Lab Units 09/05/19  0427   CRP mg/L <3 0         Vital Signs:     09/06/19 0753  --  --  --  142/80  --  --  --  --   09/06/19 07:51:07  98 °F (36 7 °C)  53Abnormal   22  139/85  103  98 %  --  Lying   09/06/19 01:12:30  98 1 °F (36 7 °C)  66  --  112/75  87  96 %  None (Room air)           Medications:   Scheduled Meds:   Current Facility-Administered Medications:  acetaminophen 650 mg Oral Q6H PRN   butalbital-acetaminophen-caffeine 1 tablet Oral Q8H PRN   cetirizine 20 mg Oral BID   diphenhydrAMINE 50 mg Intravenous Q4H PRN   enoxaparin 40 mg Subcutaneous Q24H   fluconazole 200 mg Oral Daily   HYDROmorphone 0 2 mg Intravenous Q4H PRN   Or      HYDROmorphone 0 5 mg Intravenous Q4H PRN        X 3   9/6 thus far   levofloxacin 750 mg Oral Q24H   lidocaine 2 patch Topical Daily   LORazepam 1 mg Oral Q8H PRN   methylPREDNISolone sodium succinate 40 mg Intravenous Q12H [address]   montelukast 10 mg Oral BID   ondansetron 4 mg Intravenous Q6H PRN   pantoprazole 40 mg Oral BID AC   ranitidine 300 mg Oral BID AC   saccharomyces boulardii 250 mg Oral BID   sodium chloride 150 mL/hr Intravenous Continuous   temazepam 30 mg Oral HS       Discharge Plan:    D    Network Utilization Review Department  Phone: [phone]; Fax [phone]  [email or web address]  ATTENTION: Please call with any questions or concerns to [phone]  and carefully listen to the prompts so that you are directed to the right person  Send all requests for admission clinical reviews, approved or denied determinations and any other requests to fax [phone]   All voicemails are confidential

## 2019-09-06 NOTE — NURSING NOTE
Pt still stating she is in pain  TigerText was sent again to Dr Zoe Li  Waiting response, will continue to monitor  Pt asked to speak to supervisor  Sudhir Brumfield RN made aware

## 2019-09-07 LAB
ALBUMIN SERPL BCP-MCNC: 3.1 G/DL (ref 3.5–5)
ALP SERPL-CCNC: 47 U/L (ref 46–116)
ALT SERPL W P-5'-P-CCNC: 64 U/L (ref 12–78)
ANION GAP SERPL CALCULATED.3IONS-SCNC: 8 MMOL/L (ref 4–13)
AST SERPL W P-5'-P-CCNC: 19 U/L (ref 5–45)
BASOPHILS # BLD AUTO: 0.01 THOUSANDS/ΜL (ref 0–0.1)
BASOPHILS NFR BLD AUTO: 0 % (ref 0–1)
BILIRUB SERPL-MCNC: 0.3 MG/DL (ref 0.2–1)
BUN SERPL-MCNC: 9 MG/DL (ref 5–25)
CALCIUM SERPL-MCNC: 8.2 MG/DL (ref 8.3–10.1)
CHLORIDE SERPL-SCNC: 103 MMOL/L (ref 100–108)
CO2 SERPL-SCNC: 29 MMOL/L (ref 21–32)
CREAT SERPL-MCNC: 0.69 MG/DL (ref 0.6–1.3)
EOSINOPHIL # BLD AUTO: 0 THOUSAND/ΜL (ref 0–0.61)
EOSINOPHIL NFR BLD AUTO: 0 % (ref 0–6)
ERYTHROCYTE [DISTWIDTH] IN BLOOD BY AUTOMATED COUNT: 12.5 % (ref 11.6–15.1)
GFR SERPL CREATININE-BSD FRML MDRD: 105 ML/MIN/1.73SQ M
GLUCOSE SERPL-MCNC: 107 MG/DL (ref 65–140)
HCT VFR BLD AUTO: 41.5 % (ref 34.8–46.1)
HGB BLD-MCNC: 14 G/DL (ref 11.5–15.4)
IMM GRANULOCYTES # BLD AUTO: 0.09 THOUSAND/UL (ref 0–0.2)
IMM GRANULOCYTES NFR BLD AUTO: 1 % (ref 0–2)
LIPASE SERPL-CCNC: 102 U/L (ref 73–393)
LYMPHOCYTES # BLD AUTO: 2.49 THOUSANDS/ΜL (ref 0.6–4.47)
LYMPHOCYTES NFR BLD AUTO: 18 % (ref 14–44)
MAGNESIUM SERPL-MCNC: 2 MG/DL (ref 1.6–2.6)
MCH RBC QN AUTO: 32.5 PG (ref 26.8–34.3)
MCHC RBC AUTO-ENTMCNC: 33.7 G/DL (ref 31.4–37.4)
MCV RBC AUTO: 96 FL (ref 82–98)
MONOCYTES # BLD AUTO: 1.1 THOUSAND/ΜL (ref 0.17–1.22)
MONOCYTES NFR BLD AUTO: 8 % (ref 4–12)
NEUTROPHILS # BLD AUTO: 9.92 THOUSANDS/ΜL (ref 1.85–7.62)
NEUTS SEG NFR BLD AUTO: 73 % (ref 43–75)
NRBC BLD AUTO-RTO: 0 /100 WBCS
PHOSPHATE SERPL-MCNC: 3.6 MG/DL (ref 2.7–4.5)
PLATELET # BLD AUTO: 387 THOUSANDS/UL (ref 149–390)
PMV BLD AUTO: 9.6 FL (ref 8.9–12.7)
POTASSIUM SERPL-SCNC: 3.6 MMOL/L (ref 3.5–5.3)
PROCALCITONIN SERPL-MCNC: <0.05 NG/ML
PROT SERPL-MCNC: 6.5 G/DL (ref 6.4–8.2)
RBC # BLD AUTO: 4.31 MILLION/UL (ref 3.81–5.12)
SODIUM SERPL-SCNC: 140 MMOL/L (ref 136–145)
WBC # BLD AUTO: 13.61 THOUSAND/UL (ref 4.31–10.16)

## 2019-09-07 PROCEDURE — 84145 PROCALCITONIN (PCT): CPT | Performed by: FAMILY MEDICINE

## 2019-09-07 PROCEDURE — 83735 ASSAY OF MAGNESIUM: CPT | Performed by: FAMILY MEDICINE

## 2019-09-07 PROCEDURE — 84100 ASSAY OF PHOSPHORUS: CPT | Performed by: FAMILY MEDICINE

## 2019-09-07 PROCEDURE — 83690 ASSAY OF LIPASE: CPT | Performed by: FAMILY MEDICINE

## 2019-09-07 PROCEDURE — 80053 COMPREHEN METABOLIC PANEL: CPT | Performed by: FAMILY MEDICINE

## 2019-09-07 PROCEDURE — 85025 COMPLETE CBC W/AUTO DIFF WBC: CPT | Performed by: FAMILY MEDICINE

## 2019-09-07 RX ORDER — METHYLPREDNISOLONE SODIUM SUCCINATE 40 MG/ML
40 INJECTION, POWDER, LYOPHILIZED, FOR SOLUTION INTRAMUSCULAR; INTRAVENOUS EVERY 8 HOURS SCHEDULED
Status: DISCONTINUED | OUTPATIENT
Start: 2019-09-08 | End: 2019-09-10

## 2019-09-07 RX ORDER — XYLITOL/YERBA SANTA
5 AEROSOL, SPRAY WITH PUMP (ML) MUCOUS MEMBRANE AS NEEDED
Status: DISCONTINUED | OUTPATIENT
Start: 2019-09-07 | End: 2019-09-10

## 2019-09-07 RX ORDER — LORAZEPAM 1 MG/1
1 TABLET ORAL ONCE
Status: COMPLETED | OUTPATIENT
Start: 2019-09-07 | End: 2019-09-07

## 2019-09-07 RX ADMIN — SODIUM CHLORIDE 150 ML/HR: 0.9 INJECTION, SOLUTION INTRAVENOUS at 10:41

## 2019-09-07 RX ADMIN — HYDROMORPHONE HYDROCHLORIDE 0.5 MG: 1 INJECTION, SOLUTION INTRAMUSCULAR; INTRAVENOUS; SUBCUTANEOUS at 22:48

## 2019-09-07 RX ADMIN — HYDROMORPHONE HYDROCHLORIDE 0.5 MG: 1 INJECTION, SOLUTION INTRAMUSCULAR; INTRAVENOUS; SUBCUTANEOUS at 10:38

## 2019-09-07 RX ADMIN — HYDROMORPHONE HYDROCHLORIDE 0.5 MG: 1 INJECTION, SOLUTION INTRAMUSCULAR; INTRAVENOUS; SUBCUTANEOUS at 14:39

## 2019-09-07 RX ADMIN — PANTOPRAZOLE SODIUM 40 MG: 40 TABLET, DELAYED RELEASE ORAL at 11:57

## 2019-09-07 RX ADMIN — CETIRIZINE HYDROCHLORIDE 20 MG: 10 TABLET ORAL at 13:46

## 2019-09-07 RX ADMIN — FLUCONAZOLE 200 MG: 40 POWDER, FOR SUSPENSION ORAL at 09:12

## 2019-09-07 RX ADMIN — HYDROMORPHONE HYDROCHLORIDE 0.5 MG: 1 INJECTION, SOLUTION INTRAMUSCULAR; INTRAVENOUS; SUBCUTANEOUS at 18:35

## 2019-09-07 RX ADMIN — LEVOFLOXACIN 750 MG: 750 TABLET, FILM COATED ORAL at 09:11

## 2019-09-07 RX ADMIN — HYDROMORPHONE HYDROCHLORIDE 0.5 MG: 1 INJECTION, SOLUTION INTRAMUSCULAR; INTRAVENOUS; SUBCUTANEOUS at 05:40

## 2019-09-07 RX ADMIN — Medication 250 MG: at 09:11

## 2019-09-07 RX ADMIN — ENOXAPARIN SODIUM 40 MG: 40 INJECTION SUBCUTANEOUS at 09:16

## 2019-09-07 RX ADMIN — RANITIDINE 300 MG: 150 TABLET ORAL at 11:57

## 2019-09-07 RX ADMIN — ONDANSETRON 4 MG: 2 INJECTION INTRAMUSCULAR; INTRAVENOUS at 00:35

## 2019-09-07 RX ADMIN — DIPHENHYDRAMINE HYDROCHLORIDE 50 MG: 50 INJECTION, SOLUTION INTRAMUSCULAR; INTRAVENOUS at 04:38

## 2019-09-07 RX ADMIN — RANITIDINE 300 MG: 150 TABLET ORAL at 17:18

## 2019-09-07 RX ADMIN — DIPHENHYDRAMINE HYDROCHLORIDE 50 MG: 50 INJECTION, SOLUTION INTRAMUSCULAR; INTRAVENOUS at 22:46

## 2019-09-07 RX ADMIN — PANTOPRAZOLE SODIUM 40 MG: 40 TABLET, DELAYED RELEASE ORAL at 17:18

## 2019-09-07 RX ADMIN — LIDOCAINE 2 PATCH: 50 PATCH TOPICAL at 09:16

## 2019-09-07 RX ADMIN — LORAZEPAM 1 MG: 1 TABLET ORAL at 17:26

## 2019-09-07 RX ADMIN — SODIUM CHLORIDE 150 ML/HR: 0.9 INJECTION, SOLUTION INTRAVENOUS at 03:43

## 2019-09-07 RX ADMIN — TEMAZEPAM 30 MG: 30 CAPSULE ORAL at 21:43

## 2019-09-07 RX ADMIN — SODIUM CHLORIDE 150 ML/HR: 0.9 INJECTION, SOLUTION INTRAVENOUS at 18:06

## 2019-09-07 RX ADMIN — METHYLPREDNISOLONE SODIUM SUCCINATE 20 MG: 40 INJECTION, POWDER, FOR SOLUTION INTRAMUSCULAR; INTRAVENOUS at 13:46

## 2019-09-07 RX ADMIN — MONTELUKAST 10 MG: 10 TABLET, FILM COATED ORAL at 18:35

## 2019-09-07 RX ADMIN — METHYLPREDNISOLONE SODIUM SUCCINATE 20 MG: 40 INJECTION, POWDER, FOR SOLUTION INTRAMUSCULAR; INTRAVENOUS at 05:38

## 2019-09-07 RX ADMIN — LORAZEPAM 1 MG: 1 TABLET ORAL at 12:00

## 2019-09-07 RX ADMIN — DIPHENHYDRAMINE HYDROCHLORIDE 50 MG: 50 INJECTION, SOLUTION INTRAMUSCULAR; INTRAVENOUS at 13:47

## 2019-09-07 RX ADMIN — Medication 250 MG: at 17:20

## 2019-09-07 RX ADMIN — DIPHENHYDRAMINE HYDROCHLORIDE 50 MG: 50 INJECTION, SOLUTION INTRAMUSCULAR; INTRAVENOUS at 09:16

## 2019-09-07 RX ADMIN — DIPHENHYDRAMINE HYDROCHLORIDE 50 MG: 50 INJECTION, SOLUTION INTRAMUSCULAR; INTRAVENOUS at 18:35

## 2019-09-07 RX ADMIN — MONTELUKAST 10 MG: 10 TABLET, FILM COATED ORAL at 13:47

## 2019-09-07 RX ADMIN — HYDROMORPHONE HYDROCHLORIDE 0.5 MG: 1 INJECTION, SOLUTION INTRAMUSCULAR; INTRAVENOUS; SUBCUTANEOUS at 00:34

## 2019-09-07 RX ADMIN — METHYLPREDNISOLONE SODIUM SUCCINATE 20 MG: 40 INJECTION, POWDER, FOR SOLUTION INTRAMUSCULAR; INTRAVENOUS at 21:43

## 2019-09-07 RX ADMIN — DIPHENHYDRAMINE HYDROCHLORIDE 50 MG: 50 INJECTION, SOLUTION INTRAMUSCULAR; INTRAVENOUS at 00:35

## 2019-09-07 NOTE — PLAN OF CARE
Problem: Potential for Falls  Goal: Patient will remain free of falls  Description  INTERVENTIONS:  - Assess patient frequently for physical needs  -  Identify cognitive and physical deficits and behaviors that affect risk of falls  -  [place] fall precautions as indicated by assessment  Medium fall risk   - Educate patient/family on patient safety including physical limitations  - Instruct patient to call for assistance with activity based on assessment  - Modify environment to reduce risk of injury  - Consider OT/PT consult to assist with strengthening/mobility   Outcome: Progressing     Problem: Nutrition/Hydration-ADULT  Goal: Nutrient/Hydration intake appropriate for improving, restoring or maintaining nutritional needs  Description  Monitor and assess patient's nutrition/hydration status for malnutrition  Collaborate with interdisciplinary team and initiate plan and interventions as ordered  Monitor patient's weight and dietary intake as ordered or per policy  Utilize nutrition screening tool and intervene as necessary  Determine patient's food preferences and provide high-protein, high-caloric foods as appropriate       INTERVENTIONS:  - Monitor oral intake, urinary output, labs, and treatment plans  - Assess nutrition and hydration status and recommend course of action  - Evaluate amount of meals eaten  - Allow adequate time for meals  - Recommend/ encourage appropriate diets, oral nutritional supplements, and vitamin/mineral supplements  - Order, calculate, and assess calorie counts as needed  - Assess need for intravenous fluids  - Provide specific nutrition/hydration education as appropriate  - Include patient/family/caregiver in decisions related to nutrition   Outcome: Progressing     Problem: PAIN - ADULT  Goal: Verbalizes/displays adequate comfort level or baseline comfort level  Description  Interventions:  - Encourage patient to monitor pain and request assistance  - Assess pain using appropriate pain scale; 0-10 pain scale  - Administer analgesics based on type and severity of pain and evaluate response  - Implement non-pharmacological measures as appropriate and evaluate response  - Consider cultural and social influences on pain and pain management  - Notify physician/advanced practitioner if interventions unsuccessful or patient reports new pain   Outcome: Progressing     Problem: INFECTION - ADULT  Goal: Absence or prevention of progression during hospitalization  Description  INTERVENTIONS:  - Assess and monitor for signs and symptoms of infection  - Monitor lab/diagnostic results  - Monitor all insertion sites, i e  indwelling lines, tubes, and drains  - Administer medications as ordered  - Instruct and encourage patient and family to use good hand hygiene technique   Outcome: Progressing     Problem: SAFETY ADULT  Goal: Maintain or return to baseline ADL function  Description  INTERVENTIONS:  -  Assess patient's ability to carry out ADLs; independent  - Assess patient's mobility; independent  - Encourage maximum independence but intervene and supervise when necessary  - Involve family in performance of ADLs  - Assess for home care needs following discharge   - Consider OT consult to assist with ADL evaluation and planning for discharge  - Provide patient education as appropriate   Outcome: Progressing  Goal: Maintain or return mobility status to optimal level  Description  INTERVENTIONS:  - Assess patient's baseline mobility status -Independent  - Identify cognitive and physical deficits and behaviors that affect mobility  - [place] fall precautions as indicated by assessment   Medium fall risk   - Record patient progress and toleration of activity level on Mobility SBAR; progress patient to next Phase/Stage  - Instruct patient to call for assistance with activity based on assessment  - Consider rehabilitation consult to assist with strengthening/weightbearing, etc    Outcome: Progressing     Problem: DISCHARGE PLANNING  Goal: Discharge to home or other facility with appropriate resources  Description  INTERVENTIONS:  - Identify barriers to discharge w/patient and caregiver  - Arrange for needed discharge resources and transportation as appropriate  - Identify discharge learning needs (meds, wound care, etc )  - Refer to Case Management Department for coordinating discharge planning if the patient needs post-hospital services based on physician/advanced practitioner order or complex needs related to functional status, cognitive ability, or social support system   Outcome: Progressing     Problem: Knowledge Deficit  Goal: Patient/family/caregiver demonstrates understanding of disease process, treatment plan, medications, and discharge instructions  Description  Complete learning assessment and assess knowledge base    Interventions:  - Provide teaching at level of understanding  - Provide teaching via preferred learning methods  Outcome: Progressing     Problem: GASTROINTESTINAL - ADULT  Goal: Maintains or returns to baseline bowel function  Description  INTERVENTIONS:  - Assess bowel function  - Encourage oral fluids to ensure adequate hydration  - Administer IV fluids if ordered to ensure adequate hydration  - Administer ordered medications as needed  - Encourage mobilization and activity  - Consider nutritional services referral to assist patient with adequate nutrition and appropriate food choices  Outcome: Progressing  Goal: Maintains adequate nutritional intake  Description  INTERVENTIONS:  - Monitor percentage of each meal consumed  - Identify factors contributing to decreased intake, treat as appropriate  - Assist with meals as needed  - Monitor I&O, weight, and lab values if indicated  - Obtain nutrition services referral as needed  Outcome: Progressing     Problem: METABOLIC, FLUID AND ELECTROLYTES - ADULT  Goal: Electrolytes maintained within normal limits  Description  INTERVENTIONS:  - Monitor labs and assess patient for signs and symptoms of electrolyte imbalances  - Administer electrolyte replacement as ordered  - Monitor response to electrolyte replacements, including repeat lab results as appropriate  - Instruct patient on fluid and nutrition as appropriate  Outcome: Progressing  Goal: Fluid balance maintained  Description  INTERVENTIONS:  - Monitor labs   - Monitor I/O and WT  - Instruct patient on fluid and nutrition as appropriate  - Assess for signs & symptoms of volume excess or deficit  Outcome: Progressing     Problem: DISCHARGE PLANNING - CARE MANAGEMENT  Goal: Discharge to post-acute care or home with appropriate resources  Description  INTERVENTIONS:  - Conduct assessment to determine patient/family and health care team treatment goals, and need for post-acute services based on payer coverage, community resources, and patient preferences, and barriers to discharge  - Address psychosocial, clinical, and financial barriers to discharge as identified in assessment in conjunction with the patient/family and health care team  - Arrange appropriate level of post-acute services according to patient's   needs and preference and payer coverage in collaboration with the physician and health care team  - Communicate with and update the patient/family, physician, and health care team regarding progress on the discharge plan  - Arrange appropriate transportation to post-acute venues  Pt will return home with       Outcome: Progressing

## 2019-09-08 LAB
ALBUMIN SERPL BCP-MCNC: 2.8 G/DL (ref 3.5–5)
ALP SERPL-CCNC: 47 U/L (ref 46–116)
ALT SERPL W P-5'-P-CCNC: 57 U/L (ref 12–78)
ANION GAP SERPL CALCULATED.3IONS-SCNC: 7 MMOL/L (ref 4–13)
AST SERPL W P-5'-P-CCNC: 15 U/L (ref 5–45)
BASOPHILS # BLD AUTO: 0.01 THOUSANDS/ΜL (ref 0–0.1)
BASOPHILS NFR BLD AUTO: 0 % (ref 0–1)
BILIRUB SERPL-MCNC: 0.2 MG/DL (ref 0.2–1)
BUN SERPL-MCNC: 10 MG/DL (ref 5–25)
CALCIUM SERPL-MCNC: 7.9 MG/DL (ref 8.3–10.1)
CHLORIDE SERPL-SCNC: 103 MMOL/L (ref 100–108)
CO2 SERPL-SCNC: 28 MMOL/L (ref 21–32)
CREAT SERPL-MCNC: 0.68 MG/DL (ref 0.6–1.3)
EOSINOPHIL # BLD AUTO: 0 THOUSAND/ΜL (ref 0–0.61)
EOSINOPHIL NFR BLD AUTO: 0 % (ref 0–6)
ERYTHROCYTE [DISTWIDTH] IN BLOOD BY AUTOMATED COUNT: 12.7 % (ref 11.6–15.1)
GFR SERPL CREATININE-BSD FRML MDRD: 105 ML/MIN/1.73SQ M
GLUCOSE SERPL-MCNC: 123 MG/DL (ref 65–140)
HCT VFR BLD AUTO: 41.5 % (ref 34.8–46.1)
HGB BLD-MCNC: 14 G/DL (ref 11.5–15.4)
IMM GRANULOCYTES # BLD AUTO: 0.08 THOUSAND/UL (ref 0–0.2)
IMM GRANULOCYTES NFR BLD AUTO: 1 % (ref 0–2)
LYMPHOCYTES # BLD AUTO: 1.38 THOUSANDS/ΜL (ref 0.6–4.47)
LYMPHOCYTES NFR BLD AUTO: 10 % (ref 14–44)
MAGNESIUM SERPL-MCNC: 1.8 MG/DL (ref 1.6–2.6)
MCH RBC QN AUTO: 32.3 PG (ref 26.8–34.3)
MCHC RBC AUTO-ENTMCNC: 33.7 G/DL (ref 31.4–37.4)
MCV RBC AUTO: 96 FL (ref 82–98)
MONOCYTES # BLD AUTO: 0.79 THOUSAND/ΜL (ref 0.17–1.22)
MONOCYTES NFR BLD AUTO: 6 % (ref 4–12)
NEUTROPHILS # BLD AUTO: 11.11 THOUSANDS/ΜL (ref 1.85–7.62)
NEUTS SEG NFR BLD AUTO: 83 % (ref 43–75)
NRBC BLD AUTO-RTO: 0 /100 WBCS
PHOSPHATE SERPL-MCNC: 3.8 MG/DL (ref 2.7–4.5)
PLATELET # BLD AUTO: 366 THOUSANDS/UL (ref 149–390)
PMV BLD AUTO: 9.3 FL (ref 8.9–12.7)
POTASSIUM SERPL-SCNC: 3.8 MMOL/L (ref 3.5–5.3)
PROT SERPL-MCNC: 6 G/DL (ref 6.4–8.2)
RBC # BLD AUTO: 4.33 MILLION/UL (ref 3.81–5.12)
SODIUM SERPL-SCNC: 138 MMOL/L (ref 136–145)
WBC # BLD AUTO: 13.37 THOUSAND/UL (ref 4.31–10.16)

## 2019-09-08 PROCEDURE — 80053 COMPREHEN METABOLIC PANEL: CPT | Performed by: FAMILY MEDICINE

## 2019-09-08 PROCEDURE — 83735 ASSAY OF MAGNESIUM: CPT | Performed by: FAMILY MEDICINE

## 2019-09-08 PROCEDURE — 85025 COMPLETE CBC W/AUTO DIFF WBC: CPT | Performed by: FAMILY MEDICINE

## 2019-09-08 PROCEDURE — 84100 ASSAY OF PHOSPHORUS: CPT | Performed by: FAMILY MEDICINE

## 2019-09-08 RX ORDER — SODIUM CHLORIDE 9 MG/ML
75 INJECTION, SOLUTION INTRAVENOUS CONTINUOUS
Status: DISCONTINUED | OUTPATIENT
Start: 2019-09-08 | End: 2019-09-12 | Stop reason: HOSPADM

## 2019-09-08 RX ADMIN — MONTELUKAST 10 MG: 10 TABLET, FILM COATED ORAL at 16:09

## 2019-09-08 RX ADMIN — DIPHENHYDRAMINE HYDROCHLORIDE 50 MG: 50 INJECTION, SOLUTION INTRAMUSCULAR; INTRAVENOUS at 18:00

## 2019-09-08 RX ADMIN — SODIUM CHLORIDE 150 ML/HR: 0.9 INJECTION, SOLUTION INTRAVENOUS at 16:04

## 2019-09-08 RX ADMIN — LIDOCAINE 2 PATCH: 50 PATCH TOPICAL at 09:18

## 2019-09-08 RX ADMIN — RANITIDINE 300 MG: 150 TABLET ORAL at 16:09

## 2019-09-08 RX ADMIN — HYDROMORPHONE HYDROCHLORIDE 0.5 MG: 1 INJECTION, SOLUTION INTRAMUSCULAR; INTRAVENOUS; SUBCUTANEOUS at 22:03

## 2019-09-08 RX ADMIN — ENOXAPARIN SODIUM 40 MG: 40 INJECTION SUBCUTANEOUS at 09:19

## 2019-09-08 RX ADMIN — HYDROMORPHONE HYDROCHLORIDE 0.5 MG: 1 INJECTION, SOLUTION INTRAMUSCULAR; INTRAVENOUS; SUBCUTANEOUS at 13:51

## 2019-09-08 RX ADMIN — Medication 250 MG: at 21:40

## 2019-09-08 RX ADMIN — Medication 250 MG: at 09:18

## 2019-09-08 RX ADMIN — PANTOPRAZOLE SODIUM 40 MG: 40 TABLET, DELAYED RELEASE ORAL at 12:23

## 2019-09-08 RX ADMIN — METHYLPREDNISOLONE SODIUM SUCCINATE 40 MG: 40 INJECTION, POWDER, FOR SOLUTION INTRAMUSCULAR; INTRAVENOUS at 21:35

## 2019-09-08 RX ADMIN — METHYLNALTREXONE BROMIDE 8 MG: 12 INJECTION, SOLUTION SUBCUTANEOUS at 13:39

## 2019-09-08 RX ADMIN — HYDROMORPHONE HYDROCHLORIDE 0.5 MG: 1 INJECTION, SOLUTION INTRAMUSCULAR; INTRAVENOUS; SUBCUTANEOUS at 18:00

## 2019-09-08 RX ADMIN — DIPHENHYDRAMINE HYDROCHLORIDE 50 MG: 50 INJECTION, SOLUTION INTRAMUSCULAR; INTRAVENOUS at 22:03

## 2019-09-08 RX ADMIN — Medication 5 SPRAY: at 12:58

## 2019-09-08 RX ADMIN — METHYLPREDNISOLONE SODIUM SUCCINATE 40 MG: 40 INJECTION, POWDER, FOR SOLUTION INTRAMUSCULAR; INTRAVENOUS at 13:43

## 2019-09-08 RX ADMIN — ONDANSETRON 4 MG: 2 INJECTION INTRAMUSCULAR; INTRAVENOUS at 23:39

## 2019-09-08 RX ADMIN — SODIUM CHLORIDE 150 ML/HR: 0.9 INJECTION, SOLUTION INTRAVENOUS at 09:24

## 2019-09-08 RX ADMIN — LEVOFLOXACIN 750 MG: 750 TABLET, FILM COATED ORAL at 09:18

## 2019-09-08 RX ADMIN — DIPHENHYDRAMINE HYDROCHLORIDE 50 MG: 50 INJECTION, SOLUTION INTRAMUSCULAR; INTRAVENOUS at 03:51

## 2019-09-08 RX ADMIN — SODIUM CHLORIDE 150 ML/HR: 0.9 INJECTION, SOLUTION INTRAVENOUS at 00:38

## 2019-09-08 RX ADMIN — CETIRIZINE HYDROCHLORIDE 20 MG: 10 TABLET ORAL at 16:09

## 2019-09-08 RX ADMIN — DIPHENHYDRAMINE HYDROCHLORIDE 50 MG: 50 INJECTION, SOLUTION INTRAMUSCULAR; INTRAVENOUS at 13:39

## 2019-09-08 RX ADMIN — SODIUM CHLORIDE 75 ML/HR: 0.9 INJECTION, SOLUTION INTRAVENOUS at 19:53

## 2019-09-08 RX ADMIN — RANITIDINE 300 MG: 150 TABLET ORAL at 12:23

## 2019-09-08 RX ADMIN — DIPHENHYDRAMINE HYDROCHLORIDE 50 MG: 50 INJECTION, SOLUTION INTRAMUSCULAR; INTRAVENOUS at 09:18

## 2019-09-08 RX ADMIN — TEMAZEPAM 30 MG: 30 CAPSULE ORAL at 21:35

## 2019-09-08 RX ADMIN — METHYLPREDNISOLONE SODIUM SUCCINATE 40 MG: 40 INJECTION, POWDER, FOR SOLUTION INTRAMUSCULAR; INTRAVENOUS at 05:50

## 2019-09-08 RX ADMIN — PANTOPRAZOLE SODIUM 40 MG: 40 TABLET, DELAYED RELEASE ORAL at 16:09

## 2019-09-08 RX ADMIN — FLUCONAZOLE 200 MG: 40 POWDER, FOR SUSPENSION ORAL at 09:22

## 2019-09-08 NOTE — PLAN OF CARE
Problem: Potential for Falls  Goal: Patient will remain free of falls  Description  INTERVENTIONS:  - Assess patient frequently for physical needs  -  Identify cognitive and physical deficits and behaviors that affect risk of falls  -  [place] fall precautions as indicated by assessment  Medium fall risk   - Educate patient/family on patient safety including physical limitations  - Instruct patient to call for assistance with activity based on assessment  - Modify environment to reduce risk of injury  - Consider OT/PT consult to assist with strengthening/mobility   Outcome: Progressing     Problem: Nutrition/Hydration-ADULT  Goal: Nutrient/Hydration intake appropriate for improving, restoring or maintaining nutritional needs  Description  Monitor and assess patient's nutrition/hydration status for malnutrition  Collaborate with interdisciplinary team and initiate plan and interventions as ordered  Monitor patient's weight and dietary intake as ordered or per policy  Utilize nutrition screening tool and intervene as necessary  Determine patient's food preferences and provide high-protein, high-caloric foods as appropriate       INTERVENTIONS:  - Monitor oral intake, urinary output, labs, and treatment plans  - Assess nutrition and hydration status and recommend course of action  - Evaluate amount of meals eaten  - Allow adequate time for meals  - Recommend/ encourage appropriate diets, oral nutritional supplements, and vitamin/mineral supplements  - Order, calculate, and assess calorie counts as needed  - Assess need for intravenous fluids  - Provide specific nutrition/hydration education as appropriate  - Include patient/family/caregiver in decisions related to nutrition   Outcome: Progressing     Problem: PAIN - ADULT  Goal: Verbalizes/displays adequate comfort level or baseline comfort level  Description  Interventions:  - Encourage patient to monitor pain and request assistance  - Assess pain using appropriate pain scale; 0-10 pain scale  - Administer analgesics based on type and severity of pain and evaluate response  - Implement non-pharmacological measures as appropriate and evaluate response  - Consider cultural and social influences on pain and pain management  - Notify physician/advanced practitioner if interventions unsuccessful or patient reports new pain   Outcome: Progressing     Problem: INFECTION - ADULT  Goal: Absence or prevention of progression during hospitalization  Description  INTERVENTIONS:  - Assess and monitor for signs and symptoms of infection  - Monitor lab/diagnostic results  - Monitor all insertion sites, i e  indwelling lines, tubes, and drains  - Administer medications as ordered  - Instruct and encourage patient and family to use good hand hygiene technique   Outcome: Progressing     Problem: SAFETY ADULT  Goal: Maintain or return to baseline ADL function  Description  INTERVENTIONS:  -  Assess patient's ability to carry out ADLs; independent  - Assess patient's mobility; independent  - Encourage maximum independence but intervene and supervise when necessary  - Involve family in performance of ADLs  - Assess for home care needs following discharge   - Consider OT consult to assist with ADL evaluation and planning for discharge  - Provide patient education as appropriate   Outcome: Progressing  Goal: Maintain or return mobility status to optimal level  Description  INTERVENTIONS:  - Assess patient's baseline mobility status -Independent  - Identify cognitive and physical deficits and behaviors that affect mobility  - [place] fall precautions as indicated by assessment   Medium fall risk   - Record patient progress and toleration of activity level on Mobility SBAR; progress patient to next Phase/Stage  - Instruct patient to call for assistance with activity based on assessment  - Consider rehabilitation consult to assist with strengthening/weightbearing, etc    Outcome: Progressing     Problem: DISCHARGE PLANNING  Goal: Discharge to home or other facility with appropriate resources  Description  INTERVENTIONS:  - Identify barriers to discharge w/patient and caregiver  - Arrange for needed discharge resources and transportation as appropriate  - Identify discharge learning needs (meds, wound care, etc )  - Refer to Case Management Department for coordinating discharge planning if the patient needs post-hospital services based on physician/advanced practitioner order or complex needs related to functional status, cognitive ability, or social support system   Outcome: Progressing     Problem: Knowledge Deficit  Goal: Patient/family/caregiver demonstrates understanding of disease process, treatment plan, medications, and discharge instructions  Description  Complete learning assessment and assess knowledge base    Interventions:  - Provide teaching at level of understanding  - Provide teaching via preferred learning methods  Outcome: Progressing     Problem: GASTROINTESTINAL - ADULT  Goal: Maintains or returns to baseline bowel function  Description  INTERVENTIONS:  - Assess bowel function  - Encourage oral fluids to ensure adequate hydration  - Administer IV fluids if ordered to ensure adequate hydration  - Administer ordered medications as needed  - Encourage mobilization and activity  - Consider nutritional services referral to assist patient with adequate nutrition and appropriate food choices  Outcome: Progressing  Goal: Maintains adequate nutritional intake  Description  INTERVENTIONS:  - Monitor percentage of each meal consumed  - Identify factors contributing to decreased intake, treat as appropriate  - Assist with meals as needed  - Monitor I&O, weight, and lab values if indicated  - Obtain nutrition services referral as needed  Outcome: Progressing     Problem: METABOLIC, FLUID AND ELECTROLYTES - ADULT  Goal: Electrolytes maintained within normal limits  Description  INTERVENTIONS:  - Monitor labs and assess patient for signs and symptoms of electrolyte imbalances  - Administer electrolyte replacement as ordered  - Monitor response to electrolyte replacements, including repeat lab results as appropriate  - Instruct patient on fluid and nutrition as appropriate  Outcome: Progressing  Goal: Fluid balance maintained  Description  INTERVENTIONS:  - Monitor labs   - Monitor I/O and WT  - Instruct patient on fluid and nutrition as appropriate  - Assess for signs & symptoms of volume excess or deficit  Outcome: Progressing     Problem: DISCHARGE PLANNING - CARE MANAGEMENT  Goal: Discharge to post-acute care or home with appropriate resources  Description  INTERVENTIONS:  - Conduct assessment to determine patient/family and health care team treatment goals, and need for post-acute services based on payer coverage, community resources, and patient preferences, and barriers to discharge  - Address psychosocial, clinical, and financial barriers to discharge as identified in assessment in conjunction with the patient/family and health care team  - Arrange appropriate level of post-acute services according to patient's   needs and preference and payer coverage in collaboration with the physician and health care team  - Communicate with and update the patient/family, physician, and health care team regarding progress on the discharge plan  - Arrange appropriate transportation to post-acute venues  Pt will return home with       Outcome: Progressing

## 2019-09-09 LAB
ALBUMIN SERPL BCP-MCNC: 3.1 G/DL (ref 3.5–5)
ALP SERPL-CCNC: 49 U/L (ref 46–116)
ALT SERPL W P-5'-P-CCNC: 55 U/L (ref 12–78)
ANION GAP SERPL CALCULATED.3IONS-SCNC: 7 MMOL/L (ref 4–13)
AST SERPL W P-5'-P-CCNC: 15 U/L (ref 5–45)
BASOPHILS # BLD AUTO: 0.01 THOUSANDS/ΜL (ref 0–0.1)
BASOPHILS NFR BLD AUTO: 0 % (ref 0–1)
BILIRUB SERPL-MCNC: 0.3 MG/DL (ref 0.2–1)
BUN SERPL-MCNC: 13 MG/DL (ref 5–25)
CALCIUM SERPL-MCNC: 8.7 MG/DL (ref 8.3–10.1)
CHLORIDE SERPL-SCNC: 102 MMOL/L (ref 100–108)
CO2 SERPL-SCNC: 28 MMOL/L (ref 21–32)
CREAT SERPL-MCNC: 0.67 MG/DL (ref 0.6–1.3)
EOSINOPHIL # BLD AUTO: 0 THOUSAND/ΜL (ref 0–0.61)
EOSINOPHIL NFR BLD AUTO: 0 % (ref 0–6)
ERYTHROCYTE [DISTWIDTH] IN BLOOD BY AUTOMATED COUNT: 12.7 % (ref 11.6–15.1)
GFR SERPL CREATININE-BSD FRML MDRD: 106 ML/MIN/1.73SQ M
GLUCOSE SERPL-MCNC: 131 MG/DL (ref 65–140)
HCT VFR BLD AUTO: 42.4 % (ref 34.8–46.1)
HGB BLD-MCNC: 14.4 G/DL (ref 11.5–15.4)
IMM GRANULOCYTES # BLD AUTO: 0.09 THOUSAND/UL (ref 0–0.2)
IMM GRANULOCYTES NFR BLD AUTO: 1 % (ref 0–2)
LYMPHOCYTES # BLD AUTO: 1.18 THOUSANDS/ΜL (ref 0.6–4.47)
LYMPHOCYTES NFR BLD AUTO: 8 % (ref 14–44)
MAGNESIUM SERPL-MCNC: 2.1 MG/DL (ref 1.6–2.6)
MCH RBC QN AUTO: 32.4 PG (ref 26.8–34.3)
MCHC RBC AUTO-ENTMCNC: 34 G/DL (ref 31.4–37.4)
MCV RBC AUTO: 96 FL (ref 82–98)
MONOCYTES # BLD AUTO: 0.74 THOUSAND/ΜL (ref 0.17–1.22)
MONOCYTES NFR BLD AUTO: 5 % (ref 4–12)
NEUTROPHILS # BLD AUTO: 13.29 THOUSANDS/ΜL (ref 1.85–7.62)
NEUTS SEG NFR BLD AUTO: 86 % (ref 43–75)
NRBC BLD AUTO-RTO: 0 /100 WBCS
PHOSPHATE SERPL-MCNC: 4.2 MG/DL (ref 2.7–4.5)
PLATELET # BLD AUTO: 381 THOUSANDS/UL (ref 149–390)
PMV BLD AUTO: 9.4 FL (ref 8.9–12.7)
POTASSIUM SERPL-SCNC: 3.8 MMOL/L (ref 3.5–5.3)
PROT SERPL-MCNC: 6.4 G/DL (ref 6.4–8.2)
RBC # BLD AUTO: 4.44 MILLION/UL (ref 3.81–5.12)
SODIUM SERPL-SCNC: 137 MMOL/L (ref 136–145)
WBC # BLD AUTO: 15.31 THOUSAND/UL (ref 4.31–10.16)

## 2019-09-09 PROCEDURE — 84100 ASSAY OF PHOSPHORUS: CPT | Performed by: FAMILY MEDICINE

## 2019-09-09 PROCEDURE — 83735 ASSAY OF MAGNESIUM: CPT | Performed by: FAMILY MEDICINE

## 2019-09-09 PROCEDURE — 80053 COMPREHEN METABOLIC PANEL: CPT | Performed by: FAMILY MEDICINE

## 2019-09-09 PROCEDURE — 85025 COMPLETE CBC W/AUTO DIFF WBC: CPT | Performed by: FAMILY MEDICINE

## 2019-09-09 RX ADMIN — CETIRIZINE HYDROCHLORIDE 20 MG: 10 TABLET ORAL at 14:12

## 2019-09-09 RX ADMIN — DIPHENHYDRAMINE HYDROCHLORIDE 50 MG: 50 INJECTION, SOLUTION INTRAMUSCULAR; INTRAVENOUS at 15:15

## 2019-09-09 RX ADMIN — MONTELUKAST 10 MG: 10 TABLET, FILM COATED ORAL at 19:01

## 2019-09-09 RX ADMIN — DIPHENHYDRAMINE HYDROCHLORIDE 50 MG: 50 INJECTION, SOLUTION INTRAMUSCULAR; INTRAVENOUS at 02:03

## 2019-09-09 RX ADMIN — DIPHENHYDRAMINE HYDROCHLORIDE 50 MG: 50 INJECTION, SOLUTION INTRAMUSCULAR; INTRAVENOUS at 11:07

## 2019-09-09 RX ADMIN — HYDROMORPHONE HYDROCHLORIDE 0.5 MG: 1 INJECTION, SOLUTION INTRAMUSCULAR; INTRAVENOUS; SUBCUTANEOUS at 20:57

## 2019-09-09 RX ADMIN — HYDROMORPHONE HYDROCHLORIDE 0.5 MG: 1 INJECTION, SOLUTION INTRAMUSCULAR; INTRAVENOUS; SUBCUTANEOUS at 16:44

## 2019-09-09 RX ADMIN — SODIUM CHLORIDE 75 ML/HR: 0.9 INJECTION, SOLUTION INTRAVENOUS at 15:37

## 2019-09-09 RX ADMIN — METHYLPREDNISOLONE SODIUM SUCCINATE 40 MG: 40 INJECTION, POWDER, FOR SOLUTION INTRAMUSCULAR; INTRAVENOUS at 14:20

## 2019-09-09 RX ADMIN — LORAZEPAM 1 MG: 1 TABLET ORAL at 23:29

## 2019-09-09 RX ADMIN — Medication 250 MG: at 10:10

## 2019-09-09 RX ADMIN — HYDROMORPHONE HYDROCHLORIDE 0.5 MG: 1 INJECTION, SOLUTION INTRAMUSCULAR; INTRAVENOUS; SUBCUTANEOUS at 07:20

## 2019-09-09 RX ADMIN — RANITIDINE 300 MG: 150 TABLET ORAL at 11:17

## 2019-09-09 RX ADMIN — ENOXAPARIN SODIUM 40 MG: 40 INJECTION SUBCUTANEOUS at 11:07

## 2019-09-09 RX ADMIN — Medication 250 MG: at 19:01

## 2019-09-09 RX ADMIN — TEMAZEPAM 30 MG: 30 CAPSULE ORAL at 21:02

## 2019-09-09 RX ADMIN — RANITIDINE 300 MG: 150 TABLET ORAL at 16:43

## 2019-09-09 RX ADMIN — PANTOPRAZOLE SODIUM 40 MG: 40 TABLET, DELAYED RELEASE ORAL at 11:07

## 2019-09-09 RX ADMIN — DIPHENHYDRAMINE HYDROCHLORIDE 50 MG: 50 INJECTION, SOLUTION INTRAMUSCULAR; INTRAVENOUS at 06:18

## 2019-09-09 RX ADMIN — HYDROMORPHONE HYDROCHLORIDE 0.5 MG: 1 INJECTION, SOLUTION INTRAMUSCULAR; INTRAVENOUS; SUBCUTANEOUS at 11:21

## 2019-09-09 RX ADMIN — DIPHENHYDRAMINE HYDROCHLORIDE 50 MG: 50 INJECTION, SOLUTION INTRAMUSCULAR; INTRAVENOUS at 20:56

## 2019-09-09 RX ADMIN — FLUCONAZOLE 200 MG: 40 POWDER, FOR SUSPENSION ORAL at 10:11

## 2019-09-09 RX ADMIN — SODIUM CHLORIDE 75 ML/HR: 0.9 INJECTION, SOLUTION INTRAVENOUS at 01:59

## 2019-09-09 RX ADMIN — METHYLPREDNISOLONE SODIUM SUCCINATE 40 MG: 40 INJECTION, POWDER, FOR SOLUTION INTRAMUSCULAR; INTRAVENOUS at 21:03

## 2019-09-09 RX ADMIN — CETIRIZINE HYDROCHLORIDE 20 MG: 10 TABLET ORAL at 19:02

## 2019-09-09 RX ADMIN — LEVOFLOXACIN 750 MG: 750 TABLET, FILM COATED ORAL at 10:09

## 2019-09-09 RX ADMIN — LORAZEPAM 1 MG: 1 TABLET ORAL at 15:15

## 2019-09-09 RX ADMIN — LORAZEPAM 1 MG: 1 TABLET ORAL at 01:57

## 2019-09-09 RX ADMIN — LIDOCAINE 2 PATCH: 50 PATCH TOPICAL at 10:12

## 2019-09-09 RX ADMIN — PANTOPRAZOLE SODIUM 40 MG: 40 TABLET, DELAYED RELEASE ORAL at 16:44

## 2019-09-09 RX ADMIN — METHYLNALTREXONE BROMIDE 8 MG: 12 INJECTION, SOLUTION SUBCUTANEOUS at 19:01

## 2019-09-09 RX ADMIN — METHYLPREDNISOLONE SODIUM SUCCINATE 40 MG: 40 INJECTION, POWDER, FOR SOLUTION INTRAMUSCULAR; INTRAVENOUS at 05:18

## 2019-09-09 RX ADMIN — MONTELUKAST 10 MG: 10 TABLET, FILM COATED ORAL at 14:11

## 2019-09-09 NOTE — SOCIAL WORK
Continuing to follow pt's medical status  Possible dc home tomorrow with outpatient follow up   CM will follow up in am re:?DC

## 2019-09-09 NOTE — UTILIZATION REVIEW
Continued Stay Review    Date: 09/08/2019                       Current Patient Class: IP  Current Level of Care: MED/SURG    HPI:44 y o  female initially admitted on 8/26 WITH SEVERE SEPSIS  Assessment/Plan: CONTINUES ON IV SOLUMEDROL, FREQUENT IV DILAUDID AND BENADRYL   GI SEEN 9/6 -- MODERATELY SEVERE COLITIS, CHANGING SOLUMEDROL TO 20 MG 3X/DAY    Pertinent Labs/Diagnostic Results:   Results from last 7 days   Lab Units 09/09/19  0632 09/08/19  0604 09/07/19  0556 09/06/19  0652 09/05/19  0428   WBC Thousand/uL 15 31* 13 37* 13 61* 9 90 10 73*   HEMOGLOBIN g/dL 14 4 14 0 14 0 13 4 13 2   HEMATOCRIT % 42 4 41 5 41 5 39 9 39 9   PLATELETS Thousands/uL 381 366 387 390 402*   NEUTROS ABS Thousands/µL 13 29* 11 11* 9 92* 7 84* 8 94*     Results from last 7 days   Lab Units 09/09/19  0632 09/09/19  0631 09/08/19  0604 09/07/19  0556 09/06/19  0652 09/05/19  0428   SODIUM mmol/L 137  --  138 140 135* 140   POTASSIUM mmol/L 3 8  --  3 8 3 6 3 8 3 9   CHLORIDE mmol/L 102  --  103 103 103 104   CO2 mmol/L 28  --  28 29 27 27   ANION GAP mmol/L 7  --  7 8 5 9   BUN mg/dL 13  --  10 9 10 10   CREATININE mg/dL 0 67  --  0 68 0 69 0 67 0 73   EGFR ml/min/1 73sq m 106  --  105 105 106 99   CALCIUM mg/dL 8 7  --  7 9* 8 2* 8 5 8 7   MAGNESIUM mg/dL  --  2 1 1 8 2 0 2 0 1 9   PHOSPHORUS mg/dL  --  4 2 3 8 3 6 4 6* 3 2     Results from last 7 days   Lab Units 09/09/19  0632 09/08/19  0604 09/07/19  0556 09/06/19  0652 09/05/19  0428   AST U/L 15 15 19 24 21   ALT U/L 55 57 64 57 54   ALK PHOS U/L 49 47 47 45* 53   TOTAL PROTEIN g/dL 6 4 6 0* 6 5 6 2* 6 4   ALBUMIN g/dL 3 1* 2 8* 3 1* 3 0* 3 0*   TOTAL BILIRUBIN mg/dL 0 30 0 20 0 30 0 20 0 20     Results from last 7 days   Lab Units 09/09/19  0632 09/08/19  0604 09/07/19  0556 09/06/19  0652 09/05/19  0428 09/03/19  0547   GLUCOSE RANDOM mg/dL 131 123 107 113 161* 129     Results from last 7 days   Lab Units 09/05/19  0427   TSH 3RD GENERATON uIU/mL 0 214*     Results from last 7 days   Lab Units 09/07/19  0556 09/03/19  0547   PROCALCITONIN ng/ml <0 05 <0 05     Results from last 7 days   Lab Units 09/07/19  0556   LIPASE u/L 102     Results from last 7 days   Lab Units 09/05/19  0427   CRP mg/L <3 0     Vital Signs:   Date/Time  Temp  Pulse  Resp  BP  MAP (mmHg)  SpO2  O2 Device   09/08/19 2208  98 2 °F (36 8 °C)  --  17  138/89  --  --  --   09/08/19 14:45:47  98 4 °F (36 9 °C)  63  20  108/71  83  98 %  --   09/08/19 0919  --  --  --  --  --  --  None (Room air)   09/08/19 06:00:51  98 3 °F (36 8 °C)  59  16  107/67  80  94 %  --   09/07/19 2307  97 8 °F (36 6 °C)  --  18  162/106Abnormal   --  --  --   09/07/19 21:36:20  98 5 °F (36 9 °C)  69  18  122/81  95  95 %  --   09/07/19 15:09:22  98 8 °F (37 1 °C)  60  18  118/78  91  98 %  --   09/07/19 0916  --  --  --  --  --  --  None (Room air)   09/07/19 05:53:14  98 3 °F (36 8 °C)  51Abnormal   16  118/78  --  94 %  None (Room air)       Medications:   Scheduled Meds:   Current Facility-Administered Medications:  acetaminophen 650 mg Oral Q6H PRN   butalbital-acetaminophen-caffeine 1 tablet Oral Q8H PRN   cetirizine 20 mg Oral BID   diphenhydrAMINE 50 mg Intravenous Q4H PRN 9/8 X5   enoxaparin 40 mg Subcutaneous Q24H   HYDROmorphone 0 2 mg Intravenous Q4H PRN   Or      HYDROmorphone 0 5 mg Intravenous Q4H PRN 9/8 X3   levofloxacin 750 mg Oral Q24H   lidocaine 2 patch Topical Daily   LORazepam 1 mg Oral Q8H PRN   methylnaltrexone bromide 8 mg Subcutaneous Daily   methylPREDNISolone sodium succinate 40 mg Intravenous Q8H [facility]   montelukast 10 mg Oral BID   ondansetron 4 mg Intravenous Q6H PRN  9/8 X1   pantoprazole 40 mg Oral BID AC   ranitidine 300 mg Oral BID AC   saccharomyces boulardii 250 mg Oral BID   saliva substitute 5 spray Mouth/Throat PRN   sodium chloride 75 mL/hr Intravenous Continuous   temazepam 30 mg Oral HS       Discharge Plan: TBD    Network Utilization Review Department  Phone: [phone];  Fax [phone]  [email or web address]  ATTENTION: Please call with any questions or concerns to [phone]  and carefully listen to the prompts so that you are directed to the right person  Send all requests for admission clinical reviews, approved or denied determinations and any other requests to fax [phone]   All voicemails are confidential

## 2019-09-09 NOTE — UTILIZATION REVIEW
[facility] ID CONSULT NOTE    Enedina Ford Patient Status:  Inpatient    1949 MRN [id number]   Location [facility] 4W/SW/SE Attending Corine Quintana MD   Hosp Day # 0 PCP Stephani Jasso MD       Reason for C Initial Clinical Review    ADMIT  OBS  On  2/12  @  1102    CHANGED to IP on 2/13  @  1340     ED: Date/Time/Mode of Arrival:   ED Arrival Information     Expected Arrival Acuity Means of Arrival Escorted By Service Admission Type    - 2/12/2019 06:23 Urgent Ambulance [facility]  [facility]) General Medicine Urgent    Arrival Complaint    Palpitations        Chief Complaint:   Chief Complaint   Patient presents with    Chest Pain     Patient statest that she has had palpatations since Thursday, that woke her up this morning around 0430 this morning with chest pain  History of Illness:    68-year-old female w/c/o palpitations ongoing for the past 4 days  Patient states she has just recently finished a prednisone taper (last Saturday) and has noted palpitations  She is under outpatient immunology and rheumatology care for mastocytosis and possible  mixed connective tissue disease   -  palpitations have been present for the past 3-4 weeks  She felt they were due to prednisone but today she was flushed and diaphoretic which prompted and ED visit and evaluation   -  Chronic back pain (Had MRI of lumbar spine 5 days ago and was told it's ok other than discs      I can not view results through care everywhere but see the MRI )    On exam she is complaining of chest pain, SOB, nausea, HA and back pain (chronic)     IN ER mod emesis x1     02/12/19 0628  98 4 °F (36 9 °C)  93  18  130/74  --  100 %  None (Room air)  Lying     68 7 kg (151 lb 7 3 oz)      Pertinent Labs/Diagnostic Test Results:   Wbc  17 17   7 65  pte  395  ua normal   CT lumbar spine -  Normal alignment, no sublux, no fx, no hematoma  CTAP - neg  CT head -  Normal  EKG -  SR with short IL  Trop  <0 02  x3  Lactic acid  2 3    2 3   1 2  procalcitonin  0 38    ED Treatment:   Medication Administration from 02/12/2019 0622 to 02/12/2019 1124       Date/Time Order Dose Route Action Action by Comments          Armida Serna RN Miguel Villatoro, RN      02/12/2019 7886 sodium chloride 0 9 % bolus 1,000 mL 1,000 mL Intravenous New [address] Cite Leobardo Roman, [address] [facility]      02/12/2019 8205 HYDROmorphone (DILAUDID) injection 1 mg 1 mg Intravenous Given Kassy Montiel RN                 02/12/2019 0729 sodium chloride 0 9 % bolus 1,000 mL 1,000 mL Intravenous 1333 [facility], [address] [facility]      02/12/2019 0739 promethazine (PHENERGAN) injection 25 mg 25 mg Intravenous Given Tamiko Corrales RN      02/12/2019 2767 predniSONE tablet 50 mg 50 mg Oral Given Hilario Duane, RN      02/12/2019 9231 diphenhydrAMINE (BENADRYL) tablet 25 mg 25 mg Oral Given Rubenario Duane, RN      02/12/2019 6957 famotidine (PEPCID) tablet 20 mg 20 mg Oral Given Hilario Duane, RN      02/12/2019 0833 montelukast (SINGULAIR) tablet 10 mg 10 mg Oral Given Hilario Duane, RN                 02/12/2019 1012 prochlorperazine (COMPAZINE) rectal suppository 25 mg 25 mg Rectal Given Tamiko Corrales RN      02/12/2019 1109 HYDROmorphone (DILAUDID) injection 1 mg 1 mg Intravenous Given Tamiko Corrales RN         Past Medical/Surgical History:    Active Ambulatory Problems     Diagnosis Date Noted    Chondromalacia 05/12/2017    Crohn's colitis ([facility] ) 08/25/2017    SIRS (systemic inflammatory response syndrome) ([facility]) 08/25/2017    Ulcerative colitis ([facility] ) 08/25/2017    Vitamin D deficiency 08/25/2017    Hypokalemia 08/25/2017    Hypotension 08/25/2017    Throat tightness 08/25/2017    Septic shock ([facility]) 08/25/2017    Bradycardia 08/25/2017    Abdominal pain 08/25/2017    Headache 08/25/2017    Hypophosphatemia 08/26/2017    Hypomagnesemia 08/26/2017    Generalized anxiety disorder 08/26/2017    Hypothyroidism 08/26/2017    Low TSH level 08/26/2017    Hypocalcemia 08/26/2017    Hyperglycemia 08/27/2017    Acute respiratory failure with hypoxia ([facility]) 08/27/2017    Bilateral pleural effusion 08/27/2017    Acute cardiogenic pulmonary edema ([facility] ) 08/27/2017    excision and correction of nipple inversion   • KNEE ARTHROSCOPY Right    • LAPAROSCOPIC INIT HERNIA REPAIR  2009    paraesophageal   • LÁZARO NEEDLE LOCALIZATION 1 SITE LEFT (CPT=19281)  2007   • LÁZARO NEEDLE LOCALIZATION 1 SITE RIGHT (CPT=19281)  2001   • OTH Elevated troponin level 08/28/2017    Abnormal CT of the abdomen 09/10/2017    Leukocytosis 09/10/2017    Arm swelling 09/10/2017    Thrombophlebitis 09/10/2017    Dysuria 09/28/2017    Anxiety 05/19/2016    CHF (congestive heart failure) (HCC) 11/29/2017    Fibromyalgia 12/28/2017    Mast cell disorder 05/19/2016    Meniere disease 11/29/2017    Visual disturbance 11/29/2017    Chronic idiopathic urticaria 05/02/2017    Disorder of synovium 06/23/2017    Primary localized osteoarthrosis of ankle and foot 06/04/2018     Resolved Ambulatory Problems     Diagnosis Date Noted    Lactic acidosis 08/25/2017    Gastrointestinal hemorrhage 08/25/2017    Acute diastolic CHF (congestive heart failure) ([facility] ) 08/27/2017    RUQ pain 09/10/2017     Past Medical History:   Diagnosis Date    Anxiety     Asthma     Chronic kidney disease     Deep vein thrombosis (HCC)     Disease of thyroid gland     Disorder of sphincter of Oddi     Generalized anxiety disorder 8/26/2017    Heart murmur     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[facility])     Migraine     Myocardial infarction ([facility] )     Pancreatitis     Psychiatric disorder     RA (rheumatoid arthritis) ([facility] )     Ulcerative colitis ([facility] )      Admitting Diagnosis: Palpitations [R00 2]  Chest pain [R07 9]  Intractable pain [R52]  Intractable nausea and vomiting [R11 2]    Assessment/Plan:   SIRS, no obvious source of infection other than oral thrush:  Obtain BC x2, procalcitonin now and in am  THRUSH -  Nystatin swish and swallow, diflucan   Palpitation -  Trend trops, 2D echo, telemetry, lipid panel, hgb a1c  Chronic back pain -  PT/OT , lidoderm patch  MAST Cell d/o -  outpt fup  CHF -  No s/s exacerbation ;  Obtain Echo  Ulcerative colitis,  Receives Remicade q 6 wks (last 4 wks ago) -  Cont established OP fup    Admission Orders:  Admit telemetry  Sequential compression device to b/l LE  Echo  MRI lumbar w/contrast  Diet as tolerated  IVF NS @  125    PO tylenol @ cholecalciferol (VITAMIN D3) cap/tab 2,000 Units, 2,000 Units, Oral, Daily    Review of Systems:  CONSTITUTIONAL:  No weight loss, +chills  HEENT:  Eyes:  No visual loss, blurred vision, double vision or yellow sclerae.  Ears, Nose, Throat:  No hearing loss [address]  @  1953    02/12/19 1306  --  --  --  86/4  --  --  --  Lying   02/12/19 1148  --  --  --  80/42  --  --  --  Lying   02/12/19 1147  99 2 °F (37 3 °C)  98  18  78/44  --  94 %  --  Lying       02/12/19 1614  100 4 °F (38 °C)  94  18  95/58  --  95 %  None (Room air)     2/12  @  1919  Attending note: The patient developed a fever, hypotension, and was found to have an elevated procalcitonin level  I will initiate empiric antibiotics  I will not utilize cefepime with the patient having a history of anaphylaxis allergy to penicillins    Initiate IV levofloxacin and IV vancomycin      02/13/19 0755  97 7 °F (36 5 °C)  76  18  92/56  --  97 %  None (Room air)  Lying   02/13/19 0013  --  --  --  --  --  --  None (Room air)  --   02/12/19 2354  97 4 °F (36 3 °C)Abnormal   88  17  89/60Abnormal   70  94 %  None (Room air)  Lying   02/12/19 1614  100 4 °F (38 °C)  94  18  95/58  --  95 %  None (Room air)  Lying       2/13/2019  97 7   76   18   92/56    94-97 ra sats   Tele =  SR    Scheduled Meds:   Current Facility-Administered Medications:  acetaminophen 650 mg Oral Q6H PRN Pablo Foote DO    clotrimazole 10 mg Oral TID Yaya Ceballos MD    enoxaparin 40 mg Subcutaneous Daily Jae Claros MD    famotidine 20 mg Oral BID Jae Claros MD    levofloxacin 750 mg Intravenous Q24H Pablo Foote DO Last Rate: 750 mg (02/12/19 1946)   levothyroxine 50 mcg Oral Early Morning Jae Claros MD    lidocaine 1 patch Topical Daily Jae Claros MD    LORazepam 1 mg Intravenous Q6H PRN Jae Claros MD    montelukast 10 mg Oral BID Jae Claros MD    ondansetron 4 mg Intravenous Q4H PRN Jae Claros MD    pantoprazole 40 mg Oral BID AC Jae Claros MD    sodium chloride 1,000 mL Intravenous Once Yaya Ceballos MD Last Rate: Stopped (02/12/19 1418)   sodium chloride 125 mL/hr Intravenous Continuous Jae Claros MD Last Rate: 125 mL/hr (02/13/19 5140)   temazepam 15 mg Oral HS Jae Claros MD    vancomycin 20 mg/kg Microbiology: Reviewed in EMR    Radiology: Reviewed    ASSESSMENT:    Antibiotics: Vancomycin, cefepime, day 3   68year old female with a history of HTN, with persistent R nipple discharge and inversion, with recent R breast terminal duct excision Intravenous Q12H JONATHAN Jim Last Rate: 1,250 mg (02/12/19 2136)       INTERNAL MEDICINE  Leukocytosis, tachycardia resolved  No further febrile episodes  Will obtain MRI of the lumbar spine  Continue vancomycin and Levaquin  Follow up blood cultures and procalcitonin    Given no other focus of infection will obtain an MRI of the lumbar spine with IV contrast 2/13/19

## 2019-09-09 NOTE — PLAN OF CARE
Problem: Potential for Falls  Goal: Patient will remain free of falls  Description  INTERVENTIONS:  - Assess patient frequently for physical needs  -  Identify cognitive and physical deficits and behaviors that affect risk of falls  -  [place] fall precautions as indicated by assessment  Medium fall risk   - Educate patient/family on patient safety including physical limitations  - Instruct patient to call for assistance with activity based on assessment  - Modify environment to reduce risk of injury  - Consider OT/PT consult to assist with strengthening/mobility   Outcome: Progressing     Problem: Nutrition/Hydration-ADULT  Goal: Nutrient/Hydration intake appropriate for improving, restoring or maintaining nutritional needs  Description  Monitor and assess patient's nutrition/hydration status for malnutrition  Collaborate with interdisciplinary team and initiate plan and interventions as ordered  Monitor patient's weight and dietary intake as ordered or per policy  Utilize nutrition screening tool and intervene as necessary  Determine patient's food preferences and provide high-protein, high-caloric foods as appropriate       INTERVENTIONS:  - Monitor oral intake, urinary output, labs, and treatment plans  - Assess nutrition and hydration status and recommend course of action  - Evaluate amount of meals eaten  - Allow adequate time for meals  - Recommend/ encourage appropriate diets, oral nutritional supplements, and vitamin/mineral supplements  - Order, calculate, and assess calorie counts as needed  - Assess need for intravenous fluids  - Provide specific nutrition/hydration education as appropriate  - Include patient/family/caregiver in decisions related to nutrition   Outcome: Progressing     Problem: PAIN - ADULT  Goal: Verbalizes/displays adequate comfort level or baseline comfort level  Description  Interventions:  - Encourage patient to monitor pain and request assistance  - Assess pain using appropriate pain scale; 0-10 pain scale  - Administer analgesics based on type and severity of pain and evaluate response  - Implement non-pharmacological measures as appropriate and evaluate response  - Consider cultural and social influences on pain and pain management  - Notify physician/advanced practitioner if interventions unsuccessful or patient reports new pain   Outcome: Progressing     Problem: INFECTION - ADULT  Goal: Absence or prevention of progression during hospitalization  Description  INTERVENTIONS:  - Assess and monitor for signs and symptoms of infection  - Monitor lab/diagnostic results  - Monitor all insertion sites, i e  indwelling lines, tubes, and drains  - Administer medications as ordered  - Instruct and encourage patient and family to use good hand hygiene technique   Outcome: Progressing     Problem: SAFETY ADULT  Goal: Maintain or return to baseline ADL function  Description  INTERVENTIONS:  -  Assess patient's ability to carry out ADLs; independent  - Assess patient's mobility; independent  - Encourage maximum independence but intervene and supervise when necessary  - Involve family in performance of ADLs  - Assess for home care needs following discharge   - Consider OT consult to assist with ADL evaluation and planning for discharge  - Provide patient education as appropriate   Outcome: Progressing  Goal: Maintain or return mobility status to optimal level  Description  INTERVENTIONS:  - Assess patient's baseline mobility status -Independent  - Identify cognitive and physical deficits and behaviors that affect mobility  - [place] fall precautions as indicated by assessment   Medium fall risk   - Record patient progress and toleration of activity level on Mobility SBAR; progress patient to next Phase/Stage  - Instruct patient to call for assistance with activity based on assessment  - Consider rehabilitation consult to assist with strengthening/weightbearing, etc    Outcome: Progressing     Problem: DISCHARGE PLANNING  Goal: Discharge to home or other facility with appropriate resources  Description  INTERVENTIONS:  - Identify barriers to discharge w/patient and caregiver  - Arrange for needed discharge resources and transportation as appropriate  - Identify discharge learning needs (meds, wound care, etc )  - Refer to Case Management Department for coordinating discharge planning if the patient needs post-hospital services based on physician/advanced practitioner order or complex needs related to functional status, cognitive ability, or social support system   Outcome: Progressing     Problem: Knowledge Deficit  Goal: Patient/family/caregiver demonstrates understanding of disease process, treatment plan, medications, and discharge instructions  Description  Complete learning assessment and assess knowledge base    Interventions:  - Provide teaching at level of understanding  - Provide teaching via preferred learning methods  Outcome: Progressing     Problem: GASTROINTESTINAL - ADULT  Goal: Maintains or returns to baseline bowel function  Description  INTERVENTIONS:  - Assess bowel function  - Encourage oral fluids to ensure adequate hydration  - Administer IV fluids if ordered to ensure adequate hydration  - Administer ordered medications as needed  - Encourage mobilization and activity  - Consider nutritional services referral to assist patient with adequate nutrition and appropriate food choices  Outcome: Progressing  Goal: Maintains adequate nutritional intake  Description  INTERVENTIONS:  - Monitor percentage of each meal consumed  - Identify factors contributing to decreased intake, treat as appropriate  - Assist with meals as needed  - Monitor I&O, weight, and lab values if indicated  - Obtain nutrition services referral as needed  Outcome: Progressing     Problem: METABOLIC, FLUID AND ELECTROLYTES - ADULT  Goal: Electrolytes maintained within normal limits  Description  INTERVENTIONS:  - Monitor labs and assess patient for signs and symptoms of electrolyte imbalances  - Administer electrolyte replacement as ordered  - Monitor response to electrolyte replacements, including repeat lab results as appropriate  - Instruct patient on fluid and nutrition as appropriate  Outcome: Progressing  Goal: Fluid balance maintained  Description  INTERVENTIONS:  - Monitor labs   - Monitor I/O and WT  - Instruct patient on fluid and nutrition as appropriate  - Assess for signs & symptoms of volume excess or deficit  Outcome: Progressing     Problem: DISCHARGE PLANNING - CARE MANAGEMENT  Goal: Discharge to post-acute care or home with appropriate resources  Description  INTERVENTIONS:  - Conduct assessment to determine patient/family and health care team treatment goals, and need for post-acute services based on payer coverage, community resources, and patient preferences, and barriers to discharge  - Address psychosocial, clinical, and financial barriers to discharge as identified in assessment in conjunction with the patient/family and health care team  - Arrange appropriate level of post-acute services according to patient's   needs and preference and payer coverage in collaboration with the physician and health care team  - Communicate with and update the patient/family, physician, and health care team regarding progress on the discharge plan  - Arrange appropriate transportation to post-acute venues  Pt will return home with       Outcome: Progressing

## 2019-09-10 PROBLEM — K59.00 CONSTIPATION: Status: ACTIVE | Noted: 2019-09-10

## 2019-09-10 LAB
ALBUMIN SERPL BCP-MCNC: 3.2 G/DL (ref 3.5–5)
ALP SERPL-CCNC: 46 U/L (ref 46–116)
ALT SERPL W P-5'-P-CCNC: 61 U/L (ref 12–78)
ANION GAP SERPL CALCULATED.3IONS-SCNC: 7 MMOL/L (ref 4–13)
AST SERPL W P-5'-P-CCNC: 18 U/L (ref 5–45)
BASOPHILS # BLD AUTO: 0.02 THOUSANDS/ΜL (ref 0–0.1)
BASOPHILS NFR BLD AUTO: 0 % (ref 0–1)
BILIRUB SERPL-MCNC: 0.3 MG/DL (ref 0.2–1)
BUN SERPL-MCNC: 12 MG/DL (ref 5–25)
CALCIUM SERPL-MCNC: 8.7 MG/DL (ref 8.3–10.1)
CHLORIDE SERPL-SCNC: 102 MMOL/L (ref 100–108)
CO2 SERPL-SCNC: 27 MMOL/L (ref 21–32)
CREAT SERPL-MCNC: 0.71 MG/DL (ref 0.6–1.3)
EOSINOPHIL # BLD AUTO: 0 THOUSAND/ΜL (ref 0–0.61)
EOSINOPHIL NFR BLD AUTO: 0 % (ref 0–6)
ERYTHROCYTE [DISTWIDTH] IN BLOOD BY AUTOMATED COUNT: 12.6 % (ref 11.6–15.1)
GFR SERPL CREATININE-BSD FRML MDRD: 102 ML/MIN/1.73SQ M
GLUCOSE SERPL-MCNC: 133 MG/DL (ref 65–140)
HCT VFR BLD AUTO: 43.6 % (ref 34.8–46.1)
HGB BLD-MCNC: 14.7 G/DL (ref 11.5–15.4)
IMM GRANULOCYTES # BLD AUTO: 0.19 THOUSAND/UL (ref 0–0.2)
IMM GRANULOCYTES NFR BLD AUTO: 1 % (ref 0–2)
LYMPHOCYTES # BLD AUTO: 1.06 THOUSANDS/ΜL (ref 0.6–4.47)
LYMPHOCYTES NFR BLD AUTO: 7 % (ref 14–44)
MAGNESIUM SERPL-MCNC: 2.4 MG/DL (ref 1.6–2.6)
MCH RBC QN AUTO: 32.4 PG (ref 26.8–34.3)
MCHC RBC AUTO-ENTMCNC: 33.7 G/DL (ref 31.4–37.4)
MCV RBC AUTO: 96 FL (ref 82–98)
MONOCYTES # BLD AUTO: 0.93 THOUSAND/ΜL (ref 0.17–1.22)
MONOCYTES NFR BLD AUTO: 6 % (ref 4–12)
NEUTROPHILS # BLD AUTO: 14.07 THOUSANDS/ΜL (ref 1.85–7.62)
NEUTS SEG NFR BLD AUTO: 86 % (ref 43–75)
NRBC BLD AUTO-RTO: 0 /100 WBCS
PHOSPHATE SERPL-MCNC: 4 MG/DL (ref 2.7–4.5)
PLATELET # BLD AUTO: 303 THOUSANDS/UL (ref 149–390)
PMV BLD AUTO: 10.8 FL (ref 8.9–12.7)
POTASSIUM SERPL-SCNC: 4 MMOL/L (ref 3.5–5.3)
PROT SERPL-MCNC: 6.5 G/DL (ref 6.4–8.2)
RBC # BLD AUTO: 4.54 MILLION/UL (ref 3.81–5.12)
SODIUM SERPL-SCNC: 136 MMOL/L (ref 136–145)
WBC # BLD AUTO: 16.27 THOUSAND/UL (ref 4.31–10.16)

## 2019-09-10 PROCEDURE — 83735 ASSAY OF MAGNESIUM: CPT | Performed by: FAMILY MEDICINE

## 2019-09-10 PROCEDURE — 84100 ASSAY OF PHOSPHORUS: CPT | Performed by: FAMILY MEDICINE

## 2019-09-10 PROCEDURE — 85025 COMPLETE CBC W/AUTO DIFF WBC: CPT | Performed by: FAMILY MEDICINE

## 2019-09-10 PROCEDURE — 93005 ELECTROCARDIOGRAM TRACING: CPT

## 2019-09-10 PROCEDURE — 80053 COMPREHEN METABOLIC PANEL: CPT | Performed by: FAMILY MEDICINE

## 2019-09-10 PROCEDURE — 82656 EL-1 FECAL QUAL/SEMIQ: CPT | Performed by: INTERNAL MEDICINE

## 2019-09-10 PROCEDURE — 99232 SBSQ HOSP IP/OBS MODERATE 35: CPT | Performed by: INTERNAL MEDICINE

## 2019-09-10 PROCEDURE — 99232 SBSQ HOSP IP/OBS MODERATE 35: CPT | Performed by: PHYSICIAN ASSISTANT

## 2019-09-10 RX ORDER — POLYETHYLENE GLYCOL 3350 17 G/17G
17 POWDER, FOR SOLUTION ORAL DAILY
Status: DISCONTINUED | OUTPATIENT
Start: 2019-09-10 | End: 2019-09-11

## 2019-09-10 RX ORDER — METOCLOPRAMIDE HYDROCHLORIDE 5 MG/ML
5 INJECTION INTRAMUSCULAR; INTRAVENOUS
Status: DISCONTINUED | OUTPATIENT
Start: 2019-09-10 | End: 2019-09-12 | Stop reason: HOSPADM

## 2019-09-10 RX ORDER — METHYLPREDNISOLONE SODIUM SUCCINATE 40 MG/ML
40 INJECTION, POWDER, LYOPHILIZED, FOR SOLUTION INTRAMUSCULAR; INTRAVENOUS EVERY 12 HOURS SCHEDULED
Status: DISCONTINUED | OUTPATIENT
Start: 2019-09-10 | End: 2019-09-12

## 2019-09-10 RX ORDER — XYLITOL/YERBA SANTA
5 AEROSOL, SPRAY WITH PUMP (ML) MUCOUS MEMBRANE 3 TIMES DAILY
Status: DISCONTINUED | OUTPATIENT
Start: 2019-09-10 | End: 2019-09-12 | Stop reason: HOSPADM

## 2019-09-10 RX ADMIN — HYDROMORPHONE HYDROCHLORIDE 0.5 MG: 1 INJECTION, SOLUTION INTRAMUSCULAR; INTRAVENOUS; SUBCUTANEOUS at 05:34

## 2019-09-10 RX ADMIN — Medication 250 MG: at 10:27

## 2019-09-10 RX ADMIN — ENOXAPARIN SODIUM 40 MG: 40 INJECTION SUBCUTANEOUS at 10:22

## 2019-09-10 RX ADMIN — DIPHENHYDRAMINE HYDROCHLORIDE 50 MG: 50 INJECTION, SOLUTION INTRAMUSCULAR; INTRAVENOUS at 23:48

## 2019-09-10 RX ADMIN — HYDROMORPHONE HYDROCHLORIDE 0.5 MG: 1 INJECTION, SOLUTION INTRAMUSCULAR; INTRAVENOUS; SUBCUTANEOUS at 23:48

## 2019-09-10 RX ADMIN — HYDROMORPHONE HYDROCHLORIDE 0.5 MG: 1 INJECTION, SOLUTION INTRAMUSCULAR; INTRAVENOUS; SUBCUTANEOUS at 01:01

## 2019-09-10 RX ADMIN — DIPHENHYDRAMINE HYDROCHLORIDE 50 MG: 50 INJECTION, SOLUTION INTRAMUSCULAR; INTRAVENOUS at 14:54

## 2019-09-10 RX ADMIN — RANITIDINE 300 MG: 150 TABLET ORAL at 10:30

## 2019-09-10 RX ADMIN — HYDROMORPHONE HYDROCHLORIDE 0.5 MG: 1 INJECTION, SOLUTION INTRAMUSCULAR; INTRAVENOUS; SUBCUTANEOUS at 19:20

## 2019-09-10 RX ADMIN — METOCLOPRAMIDE 5 MG: 5 INJECTION, SOLUTION INTRAMUSCULAR; INTRAVENOUS at 21:30

## 2019-09-10 RX ADMIN — MONTELUKAST 10 MG: 10 TABLET, FILM COATED ORAL at 17:40

## 2019-09-10 RX ADMIN — HYDROMORPHONE HYDROCHLORIDE 0.5 MG: 1 INJECTION, SOLUTION INTRAMUSCULAR; INTRAVENOUS; SUBCUTANEOUS at 14:54

## 2019-09-10 RX ADMIN — TEMAZEPAM 30 MG: 30 CAPSULE ORAL at 21:32

## 2019-09-10 RX ADMIN — CETIRIZINE HYDROCHLORIDE 20 MG: 10 TABLET ORAL at 14:21

## 2019-09-10 RX ADMIN — LIDOCAINE 2 PATCH: 50 PATCH TOPICAL at 10:21

## 2019-09-10 RX ADMIN — DIPHENHYDRAMINE HYDROCHLORIDE 50 MG: 50 INJECTION, SOLUTION INTRAMUSCULAR; INTRAVENOUS at 05:12

## 2019-09-10 RX ADMIN — POLYETHYLENE GLYCOL 3350 17 G: 17 POWDER, FOR SOLUTION ORAL at 11:38

## 2019-09-10 RX ADMIN — PANTOPRAZOLE SODIUM 40 MG: 40 TABLET, DELAYED RELEASE ORAL at 10:30

## 2019-09-10 RX ADMIN — DIPHENHYDRAMINE HYDROCHLORIDE 50 MG: 50 INJECTION, SOLUTION INTRAMUSCULAR; INTRAVENOUS at 01:01

## 2019-09-10 RX ADMIN — ONDANSETRON 4 MG: 2 INJECTION INTRAMUSCULAR; INTRAVENOUS at 11:38

## 2019-09-10 RX ADMIN — LEVOFLOXACIN 750 MG: 750 TABLET, FILM COATED ORAL at 10:27

## 2019-09-10 RX ADMIN — MONTELUKAST 10 MG: 10 TABLET, FILM COATED ORAL at 14:20

## 2019-09-10 RX ADMIN — METHYLNALTREXONE BROMIDE 8 MG: 12 INJECTION, SOLUTION SUBCUTANEOUS at 17:42

## 2019-09-10 RX ADMIN — PANTOPRAZOLE SODIUM 40 MG: 40 TABLET, DELAYED RELEASE ORAL at 16:25

## 2019-09-10 RX ADMIN — DIPHENHYDRAMINE HYDROCHLORIDE 50 MG: 50 INJECTION, SOLUTION INTRAMUSCULAR; INTRAVENOUS at 10:22

## 2019-09-10 RX ADMIN — CETIRIZINE HYDROCHLORIDE 20 MG: 10 TABLET ORAL at 17:40

## 2019-09-10 RX ADMIN — Medication 250 MG: at 17:39

## 2019-09-10 RX ADMIN — METOCLOPRAMIDE 5 MG: 5 INJECTION, SOLUTION INTRAMUSCULAR; INTRAVENOUS at 14:20

## 2019-09-10 RX ADMIN — DIPHENHYDRAMINE HYDROCHLORIDE 50 MG: 50 INJECTION, SOLUTION INTRAMUSCULAR; INTRAVENOUS at 18:39

## 2019-09-10 RX ADMIN — METHYLPREDNISOLONE SODIUM SUCCINATE 40 MG: 40 INJECTION, POWDER, FOR SOLUTION INTRAMUSCULAR; INTRAVENOUS at 05:13

## 2019-09-10 RX ADMIN — RANITIDINE 300 MG: 150 TABLET ORAL at 16:25

## 2019-09-10 RX ADMIN — SODIUM CHLORIDE 75 ML/HR: 0.9 INJECTION, SOLUTION INTRAVENOUS at 05:12

## 2019-09-10 RX ADMIN — SODIUM CHLORIDE 75 ML/HR: 0.9 INJECTION, SOLUTION INTRAVENOUS at 16:24

## 2019-09-10 RX ADMIN — HYDROMORPHONE HYDROCHLORIDE 0.5 MG: 1 INJECTION, SOLUTION INTRAMUSCULAR; INTRAVENOUS; SUBCUTANEOUS at 10:34

## 2019-09-10 RX ADMIN — METHYLPREDNISOLONE SODIUM SUCCINATE 40 MG: 40 INJECTION, POWDER, FOR SOLUTION INTRAMUSCULAR; INTRAVENOUS at 21:28

## 2019-09-10 NOTE — PROGRESS NOTES
Progress Note - Lindsey Pak 1975, 53 y o  female MRN: [id number]    Unit/Bed#: 405-01 Encounter: [id number]    Primary Care Provider: Kaley Stahl PA-C   Date and time admitted to hospital: 8/26/2019  3:12 PM        * Severe sepsis [facility])  Assessment & Plan  · Severe sepsis was present on admission and secondary to gram-negative bacteremia  · The patient was initially treated with IV cefepime  · The patient was seen in consultation by Infectious Disease  · The lactic acidosis has resolved  · The patient was transitioned to levofloxacin 750 mg PO every 24 hours on 08/31/2019  · The patient completed a 14-day antibiotic course during the hospitalization per Infectious Disease's recommendations      Bacteremia due to Gram-negative bacteria  Assessment & Plan  · Severe sepsis was present on admission and secondary to gram-negative bacteremia  · The patient was initially treated with IV cefepime  · The patient was seen in consultation by Infectious Disease  · The lactic acidosis has resolved  · The patient was transitioned to levofloxacin 750 mg PO every 24 hours on 08/31/2019  · The patient completed a 14-day antibiotic course during the hospitalization per Infectious Disease's recommendations      Exacerbation of ulcerative colitis with rectal bleeding (HCC)  Assessment & Plan  · Decrease methylprednisolone to 40 mg IV every 12 hours  · On chronic Remicade treatment  · The patient was seen in consultation by Gastroenterology  · Continue NSS IV fluids  · The patient is at high risk for DVT/venous thromboembolism in the setting of chronic ulcerative colitis, so she will be placed on lovenox 40 mg SQ every 24 hours DVT prophylaxis even in the setting of rectal bleeding  · The patient underwent a colonoscopy on 08/30/2019 by Gastroenterology with the following results/impression:  IMPRESSION:  Moderately severe proctosigmoiditis -2 linear ulcers noted in the mid rectum    Biopsies were taken to rule out CMV   Random biopsies were taken from ascending, transverse, descending and sigmoid colon      RECOMMENDATION:  Await pathology  Preliminary recommendation repeat in 1 yr  Follow-up with Dr Kinjal Pope current treatment      Constipation  Assessment & Plan  · Increase bowel regimen per Gastroenterology    Mass of left axilla  Assessment & Plan  · Check an LDH level    US extremity soft tissue   Status: Final result   PACS Images      Show images for US extremity soft tissue   Study Result     LEFT AXILLARY ULTRASOUND     INDICATION:   R52: Pain, unspecified  M79 89: Other specified soft tissue disorders      COMPARISON:  Ultrasound from 9/11/2018, CT a chest, abdomen and pelvis 2/12/2019 and report of mammogram from 6/26/2019     TECHNIQUE:   Real-time ultrasound of the left axilla was performed with a linear transducer with both volumetric sweeps and still imaging techniques      FINDINGS:  There are no fluid collections or suspicious masses detected  There are a few left axillary lymph nodes  A slightly prominent lymph node measures about 1 4 cm short axis  The lymph nodes otherwise demonstrate normal intrinsic appearance  No skin thickening seen  IMPRESSION:     No fluid collections or suspicious masses detected      Left axillary lymph nodes, 1 of which is mildly prominent in size, however, all visualized lymph nodes demonstrate normal intrinsic morphology  This is of doubtful clinical significance  Follow-up if any may be based on clinical grounds  · Outpatient surveillance imaging with PCP    Immunosuppressed status ([facility] [address] )  Assessment & Plan  · The patient is currently receiving IVIG treatment  · Also on chronic Remicade treatment      Mast cell activation syndrome [facility])  Assessment & Plan  · The patient is seen regularly at [facility]  · Continue her medication regimen per [facility]        Vitamin D deficiency  Assessment & Plan  · Outpatient follow-up with PCP in regards to this matter  Hypophosphatemia-resolved as of 9/3/2019  Assessment & Plan  · Resolved with IV potassium phosphate replacement therapy  Hypokalemia-resolved as of 9/3/2019  Assessment & Plan  · Resolved with IV potassium chloride supplementation  Lactic acidosis-resolved as of 9/3/2019  Assessment & Plan  · Secondary to severe sepsis  · Resolved with treatment of sepsis  VTE Pharmacologic Prophylaxis:   Pharmacologic: Enoxaparin (Lovenox)  Mechanical VTE Prophylaxis in Place: Yes    Patient Centered Rounds: I have performed bedside rounds with nursing staff today  Time Spent for Care: 30 minutes  More than 50% of total time spent on counseling and coordination of care as described above  Current Length of Stay: 15 day(s)    Current Patient Status: Inpatient   Certification Statement: The patient will continue to require additional inpatient hospital stay due to the need for IV methylprednisolone and for continuous IV fluids  Code Status: Level 1 - Full Code      Subjective: The patient was seen and examined  The patient complains of right-sided abdominal pain, a dry mouth, and constipation  Objective:     Vitals:   Temp (24hrs), Av 2 °F (36 8 °C), Min:97 7 °F (36 5 °C), Max:98 7 °F (37 1 °C)    Temp:  [97 7 °F (36 5 °C)-98 7 °F (37 1 °C)] 98 7 °F (37 1 °C)  HR:  [54-82] 54  Resp:  [18-20] 18  BP: (104-138)/(68-84) 116/69  SpO2:  [95 %-99 %] 97 %  Body mass index is 21 8 kg/m²  Input and Output Summary (last 24 hours):        Intake/Output Summary (Last 24 hours) at 9/10/2019 1425  Last data filed at 9/10/2019 0515  Gross per 24 hour   Intake 2402 5 ml   Output 500 ml   Net 1902 5 ml       Physical Exam:     Physical Exam  General:  NAD, awake, alert, follows commands  HEENT:  NC/AT, mucous membranes dry  Neck:  Supple, No JVP elevation  CV:  + S1, + S2, Bradycardic, Regular rhythm  Pulm:  Lung fields are CTA bilaterally  Abd:  Soft, + Right upper quadrant tenderness and right lower quadrant tenderness with palpation, Moderate distension  Ext:  No clubbing/cyanosis/edema  Skin:  No rashes    Additional Data:    Labs:    Results from last 7 days   Lab Units 09/10/19  0532   WBC Thousand/uL 16 27*   HEMOGLOBIN g/dL 14 7   HEMATOCRIT % 43 6   PLATELETS Thousands/uL 303   NEUTROS PCT % 86*   LYMPHS PCT % 7*   MONOS PCT % 6   EOS PCT % 0     Results from last 7 days   Lab Units 09/10/19  0532   SODIUM mmol/L 136   POTASSIUM mmol/L 4 0   CHLORIDE mmol/L 102   CO2 mmol/L 27   BUN mg/dL 12   CREATININE mg/dL 0 71   ANION GAP mmol/L 7   CALCIUM mg/dL 8 7   ALBUMIN g/dL 3 2*   TOTAL BILIRUBIN mg/dL 0 30   ALK PHOS U/L 46   ALT U/L 61   AST U/L 18   GLUCOSE RANDOM mg/dL 133                 Results from last 7 days   Lab Units 09/07/19  0556   PROCALCITONIN ng/ml <0 05           * I Have Reviewed All Lab Data Listed Above  * Additional Pertinent Lab Tests Reviewed:  All [facility] Reviewed      Recent Cultures (last 7 days):           Last 24 Hours Medication List:     Current Facility-Administered Medications:  acetaminophen 650 mg Oral Q6H PRN Gilberto Martinez PA-C    butalbital-acetaminophen-caffeine 1 tablet Oral Q8H PRN Gilberto Martinez PA-C    cetirizine 20 mg Oral BID Gilberto Martinez PA-C    diphenhydrAMINE 50 mg Intravenous Q4H PRN Gilberto Martinez PA-C    enoxaparin 40 mg Subcutaneous Q24H Gilberto Martinez PA-C    HYDROmorphone 0 2 mg Intravenous Q4H PRN Gilberto Martinez PA-C    Or        HYDROmorphone 0 5 mg Intravenous Q4H PRN Gilberto Martinez PA-C    lidocaine 2 patch Topical Daily Korin Atkinson MD    LORazepam 1 mg Oral Q8H PRN Gilberto Martinez PA-C    methylnaltrexone bromide 8 mg Subcutaneous Daily Korin Atkinson MD    methylPREDNISolone sodium succinate 40 mg Intravenous Q12H [facility] Felipe Tripp DO    metoclopramide 5 mg Intravenous 4x Daily (AC & HS) Felipe Tripp DO    montelukast 10 mg Oral BID Gilberto Martinez PA-C    ondansetron 4 mg Intravenous Q6H PRN Gilberto Martinez PA-C    pantoprazole 40 mg Oral BID AC Gilberto Martinez PA-C    polyethylene glycol 17 g Oral Daily Maria C Weston PA-C    ranitidine 300 mg Oral BID AC Gilberto Martinez PA-C    saccharomyces boulardii 250 mg Oral BID Gilberto Martinez PA-C    saliva substitute 5 spray Mouth/Throat TID Azael Alvaerz DO    sodium chloride 75 mL/hr Intravenous Continuous Lola Luna MD Last Rate: 75 mL/hr (09/10/19 0512)   temazepam 30 mg Oral HS Gilberto Martinez PA-C         Today, Patient Was Seen By: Azael Alvarez DO    ** Please Note: Dictation voice to text software may have been used in the creation of this document   **

## 2019-09-10 NOTE — PLAN OF CARE
Problem: Potential for Falls  Goal: Patient will remain free of falls  Description  INTERVENTIONS:  - Assess patient frequently for physical needs  -  Identify cognitive and physical deficits and behaviors that affect risk of falls  -  [place] fall precautions as indicated by assessment  Medium fall risk   - Educate patient/family on patient safety including physical limitations  - Instruct patient to call for assistance with activity based on assessment  - Modify environment to reduce risk of injury  - Consider OT/PT consult to assist with strengthening/mobility   Outcome: Progressing     Problem: Nutrition/Hydration-ADULT  Goal: Nutrient/Hydration intake appropriate for improving, restoring or maintaining nutritional needs  Description  Monitor and assess patient's nutrition/hydration status for malnutrition  Collaborate with interdisciplinary team and initiate plan and interventions as ordered  Monitor patient's weight and dietary intake as ordered or per policy  Utilize nutrition screening tool and intervene as necessary  Determine patient's food preferences and provide high-protein, high-caloric foods as appropriate       INTERVENTIONS:  - Monitor oral intake, urinary output, labs, and treatment plans  - Assess nutrition and hydration status and recommend course of action  - Evaluate amount of meals eaten  - Allow adequate time for meals  - Recommend/ encourage appropriate diets, oral nutritional supplements, and vitamin/mineral supplements  - Order, calculate, and assess calorie counts as needed  - Assess need for intravenous fluids  - Provide specific nutrition/hydration education as appropriate  - Include patient/family/caregiver in decisions related to nutrition   Outcome: Progressing     Problem: PAIN - ADULT  Goal: Verbalizes/displays adequate comfort level or baseline comfort level  Description  Interventions:  - Encourage patient to monitor pain and request assistance  - Assess pain using appropriate pain scale; 0-10 pain scale  - Administer analgesics based on type and severity of pain and evaluate response  - Implement non-pharmacological measures as appropriate and evaluate response  - Consider cultural and social influences on pain and pain management  - Notify physician/advanced practitioner if interventions unsuccessful or patient reports new pain   Outcome: Progressing     Problem: INFECTION - ADULT  Goal: Absence or prevention of progression during hospitalization  Description  INTERVENTIONS:  - Assess and monitor for signs and symptoms of infection  - Monitor lab/diagnostic results  - Monitor all insertion sites, i e  indwelling lines, tubes, and drains  - Administer medications as ordered  - Instruct and encourage patient and family to use good hand hygiene technique   Outcome: Progressing     Problem: SAFETY ADULT  Goal: Maintain or return to baseline ADL function  Description  INTERVENTIONS:  -  Assess patient's ability to carry out ADLs; independent  - Assess patient's mobility; independent  - Encourage maximum independence but intervene and supervise when necessary  - Involve family in performance of ADLs  - Assess for home care needs following discharge   - Consider OT consult to assist with ADL evaluation and planning for discharge  - Provide patient education as appropriate   Outcome: Progressing  Goal: Maintain or return mobility status to optimal level  Description  INTERVENTIONS:  - Assess patient's baseline mobility status -Independent  - Identify cognitive and physical deficits and behaviors that affect mobility  - [place] fall precautions as indicated by assessment   Medium fall risk   - Record patient progress and toleration of activity level on Mobility SBAR; progress patient to next Phase/Stage  - Instruct patient to call for assistance with activity based on assessment  - Consider rehabilitation consult to assist with strengthening/weightbearing, etc    Outcome: Progressing     Problem: DISCHARGE PLANNING  Goal: Discharge to home or other facility with appropriate resources  Description  INTERVENTIONS:  - Identify barriers to discharge w/patient and caregiver  - Arrange for needed discharge resources and transportation as appropriate  - Identify discharge learning needs (meds, wound care, etc )  - Refer to Case Management Department for coordinating discharge planning if the patient needs post-hospital services based on physician/advanced practitioner order or complex needs related to functional status, cognitive ability, or social support system   Outcome: Progressing     Problem: Knowledge Deficit  Goal: Patient/family/caregiver demonstrates understanding of disease process, treatment plan, medications, and discharge instructions  Description  Complete learning assessment and assess knowledge base    Interventions:  - Provide teaching at level of understanding  - Provide teaching via preferred learning methods  Outcome: Progressing     Problem: GASTROINTESTINAL - ADULT  Goal: Maintains or returns to baseline bowel function  Description  INTERVENTIONS:  - Assess bowel function  - Encourage oral fluids to ensure adequate hydration  - Administer IV fluids if ordered to ensure adequate hydration  - Administer ordered medications as needed  - Encourage mobilization and activity  - Consider nutritional services referral to assist patient with adequate nutrition and appropriate food choices  Outcome: Progressing  Goal: Maintains adequate nutritional intake  Description  INTERVENTIONS:  - Monitor percentage of each meal consumed  - Identify factors contributing to decreased intake, treat as appropriate  - Assist with meals as needed  - Monitor I&O, weight, and lab values if indicated  - Obtain nutrition services referral as needed  Outcome: Progressing     Problem: METABOLIC, FLUID AND ELECTROLYTES - ADULT  Goal: Electrolytes maintained within normal limits  Description  INTERVENTIONS:  - Monitor labs and assess patient for signs and symptoms of electrolyte imbalances  - Administer electrolyte replacement as ordered  - Monitor response to electrolyte replacements, including repeat lab results as appropriate  - Instruct patient on fluid and nutrition as appropriate  Outcome: Progressing  Goal: Fluid balance maintained  Description  INTERVENTIONS:  - Monitor labs   - Monitor I/O and WT  - Instruct patient on fluid and nutrition as appropriate  - Assess for signs & symptoms of volume excess or deficit  Outcome: Progressing     Problem: DISCHARGE PLANNING - CARE MANAGEMENT  Goal: Discharge to post-acute care or home with appropriate resources  Description  INTERVENTIONS:  - Conduct assessment to determine patient/family and health care team treatment goals, and need for post-acute services based on payer coverage, community resources, and patient preferences, and barriers to discharge  - Address psychosocial, clinical, and financial barriers to discharge as identified in assessment in conjunction with the patient/family and health care team  - Arrange appropriate level of post-acute services according to patient's   needs and preference and payer coverage in collaboration with the physician and health care team  - Communicate with and update the patient/family, physician, and health care team regarding progress on the discharge plan  - Arrange appropriate transportation to post-acute venues  Pt will return home with       Outcome: Progressing

## 2019-09-10 NOTE — PROGRESS NOTES
Progress Note - Ariana Obrien 53 y o  female MRN: [id number]    Unit/Bed#: 405-01 Encounter: [id number]         Assessment/ Plan:  Ulcerative colitis  RUQ abdominal pain   Hx of Sphincter of Oddi dysfunction    1  Ulcerative colitis - she has a long history of ulcerative colitis and is currently been on Remicade  Prior to her last infusion she was becoming symptomatic with abdominal pain, diarrhea and rectal bleeding  During her infusion 2 weeks ago before finishing she had hives and difficulty breathing and the infusion was stopped  Since being admitted to the hospital she has undergone a colonoscopy which did show moderate colitis  She is currently on IV Solu-Medrol with a good improvement of her left-sided abdominal pain and bleeding  Hemoglobin is currently within normal limits  -transitioned steroids to p o   -outpatient follow-up with GI at [place]    2  Right upper quadrant abdominal pain - more recently she has been complaining of pain in the right upper quadrant  She states this is worse after eating  She does have a history of sphincter of Oddi dysfunction for which she underwent sphincterotomy as well as previous stent placement  Unlikely that this would be the cause of her symptoms given previous sphincterotomy  She has not had a bowel movement in several days and pain may be secondary to constipation   -Start Miralax daily  -Can consider Bentyl as needed      Subjective:   Pt currently complaining of RUQ pain  Tolerating a diet but difficulty swallowing due to dry mouth  Objective:     Vitals: Blood pressure 116/69, pulse (!) 54, temperature 98 7 °F (37 1 °C), resp  rate 18, height 5' 5" (1 651 m), weight 59 4 kg (131 lb), SpO2 97 %, not currently breastfeeding  ,Body mass index is 21 8 kg/m²          Physical Exam: General appearance: alert and oriented, in no acute distress  Lungs: clear to auscultation bilaterally  Heart: regular rate and rhythm  Abdomen: +BS, soft, mildly TTP RUQ  Skin: Skin color, texture, turgor normal  No rashes or lesions     Invasive Devices     Peripheral Intravenous Line            Peripheral IV 09/05/19 Left Arm 4 days                Lab, Imaging and other studies: I have personally reviewed pertinent reports       CBC:   Lab Results   Component Value Date    WBC 16 27 (H) 09/10/2019    HGB 14 7 09/10/2019    HCT 43 6 09/10/2019    MCV 96 09/10/2019     09/10/2019    MCH 32 4 09/10/2019    MCHC 33 7 09/10/2019    RDW 12 6 09/10/2019    MPV 10 8 09/10/2019    NRBC 0 09/10/2019   ,   CMP:   Lab Results   Component Value Date    K 4 0 09/10/2019     09/10/2019    CO2 27 09/10/2019    BUN 12 09/10/2019    CREATININE 0 71 09/10/2019    CALCIUM 8 7 09/10/2019    AST 18 09/10/2019    ALT 61 09/10/2019    ALKPHOS 46 09/10/2019    EGFR 102 09/10/2019   ,     Phosphorous:   Lab Results   Component Value Date    PHOS 4 0 09/10/2019

## 2019-09-10 NOTE — PLAN OF CARE
Problem: Nutrition/Hydration-ADULT  Goal: Nutrient/Hydration intake appropriate for improving, restoring or maintaining nutritional needs  Description  Monitor and assess patient's nutrition/hydration status for malnutrition  Collaborate with interdisciplinary team and initiate plan and interventions as ordered  Monitor patient's weight and dietary intake as ordered or per policy  Utilize nutrition screening tool and intervene as necessary  Determine patient's food preferences and provide high-protein, high-caloric foods as appropriate       INTERVENTIONS:  - Monitor oral intake, urinary output, labs, and treatment plans  - Assess nutrition and hydration status and recommend course of action  - Evaluate amount of meals eaten  - Allow adequate time for meals  - Recommend/ encourage appropriate diets, oral nutritional supplements, and vitamin/mineral supplements  - Order, calculate, and assess calorie counts as needed  - Assess need for intravenous fluids  - Provide specific nutrition/hydration education as appropriate  - Include patient/family/caregiver in decisions related to nutrition   Outcome: Progressing

## 2019-09-11 LAB
ALBUMIN SERPL BCP-MCNC: 3 G/DL (ref 3.5–5)
ALP SERPL-CCNC: 44 U/L (ref 46–116)
ALT SERPL W P-5'-P-CCNC: 65 U/L (ref 12–78)
AMYLASE SERPL-CCNC: 72 IU/L (ref 25–115)
ANION GAP SERPL CALCULATED.3IONS-SCNC: 9 MMOL/L (ref 4–13)
AST SERPL W P-5'-P-CCNC: 19 U/L (ref 5–45)
ATRIAL RATE: 102 BPM
BASOPHILS # BLD AUTO: 0.01 THOUSANDS/ΜL (ref 0–0.1)
BASOPHILS NFR BLD AUTO: 0 % (ref 0–1)
BILIRUB SERPL-MCNC: 0.3 MG/DL (ref 0.2–1)
BUN SERPL-MCNC: 13 MG/DL (ref 5–25)
CALCIUM SERPL-MCNC: 8.4 MG/DL (ref 8.3–10.1)
CHLORIDE SERPL-SCNC: 101 MMOL/L (ref 100–108)
CO2 SERPL-SCNC: 28 MMOL/L (ref 21–32)
CREAT SERPL-MCNC: 0.8 MG/DL (ref 0.6–1.3)
EOSINOPHIL # BLD AUTO: 0 THOUSAND/ΜL (ref 0–0.61)
EOSINOPHIL NFR BLD AUTO: 0 % (ref 0–6)
ERYTHROCYTE [DISTWIDTH] IN BLOOD BY AUTOMATED COUNT: 13 % (ref 11.6–15.1)
GFR SERPL CREATININE-BSD FRML MDRD: 89 ML/MIN/1.73SQ M
GLUCOSE SERPL-MCNC: 153 MG/DL (ref 65–140)
HCT VFR BLD AUTO: 42.2 % (ref 34.8–46.1)
HGB BLD-MCNC: 14 G/DL (ref 11.5–15.4)
IMM GRANULOCYTES # BLD AUTO: 0.12 THOUSAND/UL (ref 0–0.2)
IMM GRANULOCYTES NFR BLD AUTO: 1 % (ref 0–2)
LYMPHOCYTES # BLD AUTO: 0.81 THOUSANDS/ΜL (ref 0.6–4.47)
LYMPHOCYTES NFR BLD AUTO: 6 % (ref 14–44)
MAGNESIUM SERPL-MCNC: 2 MG/DL (ref 1.6–2.6)
MCH RBC QN AUTO: 32.3 PG (ref 26.8–34.3)
MCHC RBC AUTO-ENTMCNC: 33.2 G/DL (ref 31.4–37.4)
MCV RBC AUTO: 97 FL (ref 82–98)
MONOCYTES # BLD AUTO: 0.6 THOUSAND/ΜL (ref 0.17–1.22)
MONOCYTES NFR BLD AUTO: 5 % (ref 4–12)
NEUTROPHILS # BLD AUTO: 11.72 THOUSANDS/ΜL (ref 1.85–7.62)
NEUTS SEG NFR BLD AUTO: 88 % (ref 43–75)
NRBC BLD AUTO-RTO: 0 /100 WBCS
P AXIS: 79 DEGREES
PHOSPHATE SERPL-MCNC: 3.6 MG/DL (ref 2.7–4.5)
PLATELET # BLD AUTO: 384 THOUSANDS/UL (ref 149–390)
PMV BLD AUTO: 10.3 FL (ref 8.9–12.7)
POTASSIUM SERPL-SCNC: 4 MMOL/L (ref 3.5–5.3)
PR INTERVAL: 126 MS
PROCALCITONIN SERPL-MCNC: <0.05 NG/ML
PROT SERPL-MCNC: 6.2 G/DL (ref 6.4–8.2)
QRS AXIS: 75 DEGREES
QRSD INTERVAL: 86 MS
QT INTERVAL: 350 MS
QTC INTERVAL: 456 MS
RBC # BLD AUTO: 4.34 MILLION/UL (ref 3.81–5.12)
SODIUM SERPL-SCNC: 138 MMOL/L (ref 136–145)
T WAVE AXIS: 226 DEGREES
VENTRICULAR RATE: 102 BPM
WBC # BLD AUTO: 13.26 THOUSAND/UL (ref 4.31–10.16)

## 2019-09-11 PROCEDURE — 83735 ASSAY OF MAGNESIUM: CPT | Performed by: INTERNAL MEDICINE

## 2019-09-11 PROCEDURE — 99232 SBSQ HOSP IP/OBS MODERATE 35: CPT | Performed by: INTERNAL MEDICINE

## 2019-09-11 PROCEDURE — 93010 ELECTROCARDIOGRAM REPORT: CPT | Performed by: INTERNAL MEDICINE

## 2019-09-11 PROCEDURE — 84145 PROCALCITONIN (PCT): CPT | Performed by: INTERNAL MEDICINE

## 2019-09-11 PROCEDURE — 85025 COMPLETE CBC W/AUTO DIFF WBC: CPT | Performed by: INTERNAL MEDICINE

## 2019-09-11 PROCEDURE — 99232 SBSQ HOSP IP/OBS MODERATE 35: CPT | Performed by: PHYSICIAN ASSISTANT

## 2019-09-11 PROCEDURE — 87040 BLOOD CULTURE FOR BACTERIA: CPT | Performed by: INTERNAL MEDICINE

## 2019-09-11 PROCEDURE — 82150 ASSAY OF AMYLASE: CPT | Performed by: INTERNAL MEDICINE

## 2019-09-11 PROCEDURE — 80053 COMPREHEN METABOLIC PANEL: CPT | Performed by: INTERNAL MEDICINE

## 2019-09-11 PROCEDURE — 84100 ASSAY OF PHOSPHORUS: CPT | Performed by: INTERNAL MEDICINE

## 2019-09-11 RX ORDER — POLYETHYLENE GLYCOL 3350 17 G/17G
17 POWDER, FOR SOLUTION ORAL 2 TIMES DAILY
Status: DISCONTINUED | OUTPATIENT
Start: 2019-09-11 | End: 2019-09-12 | Stop reason: HOSPADM

## 2019-09-11 RX ORDER — SIMETHICONE 80 MG
80 TABLET,CHEWABLE ORAL EVERY 6 HOURS PRN
Status: DISCONTINUED | OUTPATIENT
Start: 2019-09-11 | End: 2019-09-12 | Stop reason: HOSPADM

## 2019-09-11 RX ADMIN — PANTOPRAZOLE SODIUM 40 MG: 40 TABLET, DELAYED RELEASE ORAL at 16:45

## 2019-09-11 RX ADMIN — METOCLOPRAMIDE 5 MG: 5 INJECTION, SOLUTION INTRAMUSCULAR; INTRAVENOUS at 06:03

## 2019-09-11 RX ADMIN — Medication 5 SPRAY: at 21:44

## 2019-09-11 RX ADMIN — Medication 250 MG: at 10:07

## 2019-09-11 RX ADMIN — Medication 5 SPRAY: at 15:28

## 2019-09-11 RX ADMIN — MONTELUKAST 10 MG: 10 TABLET, FILM COATED ORAL at 14:05

## 2019-09-11 RX ADMIN — CETIRIZINE HYDROCHLORIDE 20 MG: 10 TABLET ORAL at 14:05

## 2019-09-11 RX ADMIN — HYDROMORPHONE HYDROCHLORIDE 0.5 MG: 1 INJECTION, SOLUTION INTRAMUSCULAR; INTRAVENOUS; SUBCUTANEOUS at 21:45

## 2019-09-11 RX ADMIN — RANITIDINE 300 MG: 150 TABLET ORAL at 16:45

## 2019-09-11 RX ADMIN — PANTOPRAZOLE SODIUM 40 MG: 40 TABLET, DELAYED RELEASE ORAL at 11:05

## 2019-09-11 RX ADMIN — ENOXAPARIN SODIUM 40 MG: 40 INJECTION SUBCUTANEOUS at 10:06

## 2019-09-11 RX ADMIN — HYDROMORPHONE HYDROCHLORIDE 0.5 MG: 1 INJECTION, SOLUTION INTRAMUSCULAR; INTRAVENOUS; SUBCUTANEOUS at 11:05

## 2019-09-11 RX ADMIN — CETIRIZINE HYDROCHLORIDE 20 MG: 10 TABLET ORAL at 18:08

## 2019-09-11 RX ADMIN — HYDROMORPHONE HYDROCHLORIDE 0.5 MG: 1 INJECTION, SOLUTION INTRAMUSCULAR; INTRAVENOUS; SUBCUTANEOUS at 15:25

## 2019-09-11 RX ADMIN — SODIUM CHLORIDE 75 ML/HR: 0.9 INJECTION, SOLUTION INTRAVENOUS at 05:57

## 2019-09-11 RX ADMIN — METHYLPREDNISOLONE SODIUM SUCCINATE 40 MG: 40 INJECTION, POWDER, FOR SOLUTION INTRAMUSCULAR; INTRAVENOUS at 21:39

## 2019-09-11 RX ADMIN — DIPHENHYDRAMINE HYDROCHLORIDE 50 MG: 50 INJECTION, SOLUTION INTRAMUSCULAR; INTRAVENOUS at 03:52

## 2019-09-11 RX ADMIN — METOCLOPRAMIDE 5 MG: 5 INJECTION, SOLUTION INTRAMUSCULAR; INTRAVENOUS at 11:05

## 2019-09-11 RX ADMIN — DIPHENHYDRAMINE HYDROCHLORIDE 50 MG: 50 INJECTION, SOLUTION INTRAMUSCULAR; INTRAVENOUS at 14:06

## 2019-09-11 RX ADMIN — HYDROMORPHONE HYDROCHLORIDE 0.5 MG: 1 INJECTION, SOLUTION INTRAMUSCULAR; INTRAVENOUS; SUBCUTANEOUS at 04:53

## 2019-09-11 RX ADMIN — DIPHENHYDRAMINE HYDROCHLORIDE 50 MG: 50 INJECTION, SOLUTION INTRAMUSCULAR; INTRAVENOUS at 10:13

## 2019-09-11 RX ADMIN — METOCLOPRAMIDE 5 MG: 5 INJECTION, SOLUTION INTRAMUSCULAR; INTRAVENOUS at 15:25

## 2019-09-11 RX ADMIN — TEMAZEPAM 30 MG: 30 CAPSULE ORAL at 21:44

## 2019-09-11 RX ADMIN — LIDOCAINE 2 PATCH: 50 PATCH TOPICAL at 10:06

## 2019-09-11 RX ADMIN — METOCLOPRAMIDE 5 MG: 5 INJECTION, SOLUTION INTRAMUSCULAR; INTRAVENOUS at 21:39

## 2019-09-11 RX ADMIN — Medication 250 MG: at 17:52

## 2019-09-11 RX ADMIN — LORAZEPAM 1 MG: 1 TABLET ORAL at 16:47

## 2019-09-11 RX ADMIN — METHYLNALTREXONE BROMIDE 8 MG: 12 INJECTION, SOLUTION SUBCUTANEOUS at 17:57

## 2019-09-11 RX ADMIN — POLYETHYLENE GLYCOL 3350 17 G: 17 POWDER, FOR SOLUTION ORAL at 10:06

## 2019-09-11 RX ADMIN — MONTELUKAST 10 MG: 10 TABLET, FILM COATED ORAL at 17:52

## 2019-09-11 RX ADMIN — SODIUM CHLORIDE 75 ML/HR: 0.9 INJECTION, SOLUTION INTRAVENOUS at 19:44

## 2019-09-11 RX ADMIN — POLYETHYLENE GLYCOL 3350 17 G: 17 POWDER, FOR SOLUTION ORAL at 17:50

## 2019-09-11 RX ADMIN — METHYLPREDNISOLONE SODIUM SUCCINATE 40 MG: 40 INJECTION, POWDER, FOR SOLUTION INTRAMUSCULAR; INTRAVENOUS at 10:06

## 2019-09-11 RX ADMIN — RANITIDINE 300 MG: 150 TABLET ORAL at 11:05

## 2019-09-11 RX ADMIN — DIPHENHYDRAMINE HYDROCHLORIDE 50 MG: 50 INJECTION, SOLUTION INTRAMUSCULAR; INTRAVENOUS at 22:12

## 2019-09-11 RX ADMIN — DIPHENHYDRAMINE HYDROCHLORIDE 50 MG: 50 INJECTION, SOLUTION INTRAMUSCULAR; INTRAVENOUS at 18:07

## 2019-09-11 NOTE — ASSESSMENT & PLAN NOTE
Results for Shaye Henson (MRN [id number]) as of 9/11/2019 13:20   Ref  Range 9/3/2019 05:47   LD ([facility]) Latest Ref Range: 81 - 234 U/L 220     US extremity soft tissue   Status: Final result   PACS Images      Show images for US extremity soft tissue   Study Result     LEFT AXILLARY ULTRASOUND     INDICATION:   R52: Pain, unspecified  M79 89: Other specified soft tissue disorders      COMPARISON:  Ultrasound from 9/11/2018, CT a chest, abdomen and pelvis 2/12/2019 and report of mammogram from 6/26/2019     TECHNIQUE:   Real-time ultrasound of the left axilla was performed with a linear transducer with both volumetric sweeps and still imaging techniques      FINDINGS:  There are no fluid collections or suspicious masses detected  There are a few left axillary lymph nodes  A slightly prominent lymph node measures about 1 4 cm short axis  The lymph nodes otherwise demonstrate normal intrinsic appearance  No skin thickening seen  IMPRESSION:     No fluid collections or suspicious masses detected      Left axillary lymph nodes, 1 of which is mildly prominent in size, however, all visualized lymph nodes demonstrate normal intrinsic morphology  This is of doubtful clinical significance  Follow-up if any may be based on clinical grounds       · Outpatient surveillance imaging with PCP

## 2019-09-11 NOTE — PROGRESS NOTES
Progress Note - Angela Howe 53 y o  female MRN: [id number]    Unit/Bed#: 405-01 Encounter: [id number]         Assessment/ Plan:  Ulcerative colitis  RUQ abdominal pain   Hx of Sphincter of Oddi dysfunction     1  Ulcerative colitis - she has a long history of ulcerative colitis and is currently been on Remicade  Prior to her last infusion she was becoming symptomatic with abdominal pain, diarrhea and rectal bleeding  During her infusion 2 weeks ago before finishing she had hives and difficulty breathing and the infusion was stopped  Since being admitted to the hospital she has undergone a colonoscopy which did show moderate colitis  She is currently on IV Solu-Medrol with a good improvement of her left-sided abdominal pain and bleeding  Hemoglobin is currently within normal limits  -transition to steroids to p o   -outpatient follow-up with GI at [address]  2  Right upper quadrant abdominal pain - more recently she has been complaining of pain in the right upper quadrant  She states this is worse after eating  She does have a history of sphincter of Oddi dysfunction for which she underwent sphincterotomy as well as previous stent placement  Unlikely that this would be the cause of her symptoms given previous sphincterotomy  She has not had a bowel movement in several days and pain may be secondary to constipation/ IBS  Slight improvement in pain after BM  -Increase Miralax to BID  -Simethicone prn  -Decrease narcotics        Subjective:   Pt continues with RUQ pain, but slightly better then yesterday  She is eating a little & did have a BM yesterday but it was difficult to go  Objective:     Vitals: Blood pressure 100/64, pulse 57, temperature 98 2 °F (36 8 °C), resp  rate 16, height 5' 5" (1 651 m), weight 60 1 kg (132 lb 7 9 oz), SpO2 97 %, not currently breastfeeding  ,Body mass index is 22 05 kg/m²          Physical Exam: General appearance: alert and oriented, in no acute distress  Lungs: clear to auscultation bilaterally  Heart: regular rate and rhythm  Abdomen: +BS, soft, TTP RUQ  Skin: Skin color, texture, turgor normal  No rashes or lesions     Invasive Devices     Peripheral Intravenous Line            Peripheral IV 09/10/19 Dorsal (posterior); Right Hand 1 day                Lab, Imaging and other studies: I have personally reviewed pertinent reports       CBC:   Lab Results   Component Value Date    WBC 13 26 (H) 09/11/2019    HGB 14 0 09/11/2019    HCT 42 2 09/11/2019    MCV 97 09/11/2019     09/11/2019    MCH 32 3 09/11/2019    MCHC 33 2 09/11/2019    RDW 13 0 09/11/2019    MPV 10 3 09/11/2019    NRBC 0 09/11/2019   ,   CMP:   Lab Results   Component Value Date    K 4 0 09/11/2019     09/11/2019    CO2 28 09/11/2019    BUN 13 09/11/2019    CREATININE 0 80 09/11/2019    CALCIUM 8 4 09/11/2019    AST 19 09/11/2019    ALT 65 09/11/2019    ALKPHOS 44 (L) 09/11/2019    EGFR 89 09/11/2019   ,     Phosphorous:   Lab Results   Component Value Date    PHOS 3 6 09/11/2019

## 2019-09-11 NOTE — ASSESSMENT & PLAN NOTE
· The methylprednisolone was decreased to 40 mg IV every 12 hours on 09/10/2019  · Continue to wean the IV methylprednisolone  · On chronic Remicade treatment  · The patient was seen in consultation by Gastroenterology  · Continue NSS IV fluids  · The patient is at high risk for DVT/venous thromboembolism in the setting of chronic ulcerative colitis, so she will be placed on lovenox 40 mg SQ every 24 hours DVT prophylaxis even in the setting of rectal bleeding  · The patient underwent a colonoscopy on 08/30/2019 by Gastroenterology with the following results/impression:  IMPRESSION:  Moderately severe proctosigmoiditis -2 linear ulcers noted in the mid rectum  Biopsies were taken to rule out CMV  Random biopsies were taken from ascending, transverse, descending and sigmoid colon      RECOMMENDATION:  Await pathology  Preliminary recommendation repeat in 1 yr    Follow-up with Dr Elio Harper current treatment

## 2019-09-11 NOTE — PROGRESS NOTES
Progress Note - Minna Jewell 1975, 53 y o  female MRN: [id number]    Unit/Bed#: 405-01 Encounter: [id number]    Primary Care Provider: Angela Hidalgo PA-C   Date and time admitted to hospital: 8/26/2019  3:12 PM        * Severe sepsis [facility])  Assessment & Plan  · Severe sepsis was present on admission and secondary to gram-negative bacteremia  · The patient was initially treated with IV cefepime  · The patient was seen in consultation by Infectious Disease  · The lactic acidosis has resolved  · The patient was transitioned to levofloxacin 750 mg PO every 24 hours on 08/31/2019  · The patient completed a 14-day antibiotic course during the hospitalization per Infectious Disease's recommendations      Bacteremia due to Gram-negative bacteria  Assessment & Plan  · Severe sepsis was present on admission and secondary to gram-negative bacteremia  · The patient was initially treated with IV cefepime  · The patient was seen in consultation by Infectious Disease  · The lactic acidosis has resolved  · The patient was transitioned to levofloxacin 750 mg PO every 24 hours on 08/31/2019  · The patient completed a 14-day antibiotic course during the hospitalization per Infectious Disease's recommendations      Exacerbation of ulcerative colitis with rectal bleeding ([facility] [address] )  Assessment & Plan  · The methylprednisolone was decreased to 40 mg IV every 12 hours on 09/10/2019  · Continue to wean the IV methylprednisolone  · On chronic Remicade treatment  · The patient was seen in consultation by Gastroenterology  · Continue NSS IV fluids  · The patient is at high risk for DVT/venous thromboembolism in the setting of chronic ulcerative colitis, so she will be placed on lovenox 40 mg SQ every 24 hours DVT prophylaxis even in the setting of rectal bleeding  · The patient underwent a colonoscopy on 08/30/2019 by Gastroenterology with the following results/impression:  IMPRESSION:  Moderately severe proctosigmoiditis -2 linear ulcers noted in the mid rectum  Biopsies were taken to rule out CMV  Random biopsies were taken from ascending, transverse, descending and sigmoid colon      RECOMMENDATION:  Await pathology  Preliminary recommendation repeat in 1 yr  Follow-up with Dr Silva Halsted current treatment      Constipation  Assessment & Plan  · Increase miralax to 17 grams PO BID per Gastroenterology    Mass of left axilla  Assessment & Plan  Results for Lizbeth Sarmiento (MRN [id number]) as of 9/11/2019 13:20   Ref  Range 9/3/2019 05:47   LD ([facility]) Latest Ref Range: 81 - 234 U/L 220     US extremity soft tissue   Status: Final result   PACS Images      Show images for US extremity soft tissue   Study Result     LEFT AXILLARY ULTRASOUND     INDICATION:   R52: Pain, unspecified  M79 89: Other specified soft tissue disorders      COMPARISON:  Ultrasound from 9/11/2018, CT a chest, abdomen and pelvis 2/12/2019 and report of mammogram from 6/26/2019     TECHNIQUE:   Real-time ultrasound of the left axilla was performed with a linear transducer with both volumetric sweeps and still imaging techniques      FINDINGS:  There are no fluid collections or suspicious masses detected  There are a few left axillary lymph nodes  A slightly prominent lymph node measures about 1 4 cm short axis  The lymph nodes otherwise demonstrate normal intrinsic appearance  No skin thickening seen  IMPRESSION:     No fluid collections or suspicious masses detected      Left axillary lymph nodes, 1 of which is mildly prominent in size, however, all visualized lymph nodes demonstrate normal intrinsic morphology  This is of doubtful clinical significance  Follow-up if any may be based on clinical grounds       · Outpatient surveillance imaging with PCP    Immunosuppressed status ([facility] )  Assessment & Plan  · The patient is currently receiving IVIG treatment  · Also on chronic Remicade treatment      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[facility] )  Assessment & Plan  · The patient is seen regularly at [facility]  · Continue her medication regimen per [facility]  Vitamin D deficiency  Assessment & Plan  · Outpatient follow-up with PCP in regards to this matter  Hypophosphatemia-resolved as of 9/3/2019  Assessment & Plan  · Resolved with IV potassium phosphate replacement therapy      Hypokalemia-resolved as of 9/3/2019  Assessment & Plan  · Resolved with IV potassium chloride supplementation  Lactic acidosis-resolved as of 9/3/2019  Assessment & Plan  · Secondary to severe sepsis  · Resolved with treatment of sepsis  VTE Pharmacologic Prophylaxis:   Pharmacologic: Enoxaparin (Lovenox)  Mechanical VTE Prophylaxis in Place: Yes    Patient Centered Rounds: I have performed bedside rounds with nursing staff today  Time Spent for Care: 20 minutes  More than 50% of total time spent on counseling and coordination of care as described above  Current Length of Stay: 16 day(s)    Current Patient Status: Inpatient   Certification Statement: The patient will continue to require additional inpatient hospital stay due to the need for continuous IV fluids and the need for IV methylprednisolone  Code Status: Level 1 - Full Code      Subjective: The patient was seen and examined  The patient continues to complain of right-sided abdominal pain  Objective:     Vitals:   Temp (24hrs), Av 3 °F (36 8 °C), Min:98 2 °F (36 8 °C), Max:98 4 °F (36 9 °C)    Temp:  [98 2 °F (36 8 °C)-98 4 °F (36 9 °C)] 98 2 °F (36 8 °C)  HR:  [57-95] 57  Resp:  [16-18] 16  BP: (100-111)/(64-74) 100/64  SpO2:  [93 %-97 %] 97 %  Body mass index is 22 05 kg/m²  Input and Output Summary (last 24 hours):        Intake/Output Summary (Last 24 hours) at 2019 1326  Last data filed at 2019 1252  Gross per 24 hour   Intake 1640 ml   Output 2100 ml   Net -460 ml       Physical Exam:     Physical Exam  General:  NAD, awake, alert, follows commands  HEENT:  NC/AT, mucous membranes moist  Neck:  Supple, No JVP elevation  CV:  + S1, + S2, RRR  Pulm:  Lung fields are CTA bilaterally  Abd:  Soft, Mild distension, + Right upper quadrant abdominal tenderness with palpation  Ext:  No clubbing/cyanosis/edema  Skin:  No rashes      Additional Data:    Labs:    Results from last 7 days   Lab Units 09/11/19  0412   WBC Thousand/uL 13 26*   HEMOGLOBIN g/dL 14 0   HEMATOCRIT % 42 2   PLATELETS Thousands/uL 384   NEUTROS PCT % 88*   LYMPHS PCT % 6*   MONOS PCT % 5   EOS PCT % 0     Results from last 7 days   Lab Units 09/11/19  0413   SODIUM mmol/L 138   POTASSIUM mmol/L 4 0   CHLORIDE mmol/L 101   CO2 mmol/L 28   BUN mg/dL 13   CREATININE mg/dL 0 80   ANION GAP mmol/L 9   CALCIUM mg/dL 8 4   ALBUMIN g/dL 3 0*   TOTAL BILIRUBIN mg/dL 0 30   ALK PHOS U/L 44*   ALT U/L 65   AST U/L 19   GLUCOSE RANDOM mg/dL 153*                 Results from last 7 days   Lab Units 09/07/19  0556   PROCALCITONIN ng/ml <0 05           * I Have Reviewed All Lab Data Listed Above  * Additional Pertinent Lab Tests Reviewed:  All [facility] Admission Reviewed      Recent Cultures (last 7 days):           Last 24 Hours Medication List:     Current Facility-Administered Medications:  acetaminophen 650 mg Oral Q6H PRN Gilberto Martinez PA-C    butalbital-acetaminophen-caffeine 1 tablet Oral Q8H PRN Gilberto Martinez PA-C    cetirizine 20 mg Oral BID Gilberto Martinez PA-C    diphenhydrAMINE 50 mg Intravenous Q4H PRN Gilberto Martinez PA-C    enoxaparin 40 mg Subcutaneous Q24H Gilberto Martinez PA-C    HYDROmorphone 0 2 mg Intravenous Q4H PRN Gilberto Martinez PA-C    Or        HYDROmorphone 0 5 mg Intravenous Q4H PRN Gilberto Martinez PA-C    lidocaine 2 patch Topical Daily Korin Atkinson MD    LORazepam 1 mg Oral Q8H PRN Gilberto Martinez PA-C    methylnaltrexone bromide 8 mg Subcutaneous Daily Korin Atkinson MD    methylPREDNISolone sodium succinate 40 mg Intravenous Q12H 7639 Madie Woo DO metoclopramide 5 mg Intravenous 4x Daily (AC & HS) Yoni Mario DO    montelukast 10 mg Oral BID Gilberto Martinez PA-C    ondansetron 4 mg Intravenous Q6H PRN Gilberto Martinez PA-C    pantoprazole 40 mg Oral BID AC Gilberto Martinez PA-C    polyethylene glycol 17 g Oral BID Luna Foot, RANDEE    ranitidine 300 mg Oral BID AC Gilberto Martinez PA-C    saccharomyces boulardii 250 mg Oral BID Gilberto Martinez PA-C    saliva substitute 5 spray Mouth/Throat TID Yoni Mario DO    simethicone 80 mg Oral Q6H PRN Luna Foot, RANDEE    sodium chloride 75 mL/hr Intravenous Continuous Leta Davis MD Last Rate: 75 mL/hr (09/11/19 0557)   temazepam 30 mg Oral HS Gilberto Martinez PA-C         Today, Patient Was Seen By: Yoni Mario DO    ** Please Note: Dictation voice to text software may have been used in the creation of this document   **

## 2019-09-11 NOTE — ASSESSMENT & PLAN NOTE
· The patient is seen regularly at [facility]  · Continue her medication regimen per [facility]

## 2019-09-11 NOTE — PLAN OF CARE
Problem: Potential for Falls  Goal: Patient will remain free of falls  Description  INTERVENTIONS:  - Assess patient frequently for physical needs  -  Identify cognitive and physical deficits and behaviors that affect risk of falls  -  [place] fall precautions as indicated by assessment  Medium fall risk   - Educate patient/family on patient safety including physical limitations  - Instruct patient to call for assistance with activity based on assessment  - Modify environment to reduce risk of injury  - Consider OT/PT consult to assist with strengthening/mobility   Outcome: Progressing     Problem: Nutrition/Hydration-ADULT  Goal: Nutrient/Hydration intake appropriate for improving, restoring or maintaining nutritional needs  Description  Monitor and assess patient's nutrition/hydration status for malnutrition  Collaborate with interdisciplinary team and initiate plan and interventions as ordered  Monitor patient's weight and dietary intake as ordered or per policy  Utilize nutrition screening tool and intervene as necessary  Determine patient's food preferences and provide high-protein, high-caloric foods as appropriate       INTERVENTIONS:  - Monitor oral intake, urinary output, labs, and treatment plans  - Assess nutrition and hydration status and recommend course of action  - Evaluate amount of meals eaten  - Allow adequate time for meals  - Recommend/ encourage appropriate diets, oral nutritional supplements, and vitamin/mineral supplements  - Order, calculate, and assess calorie counts as needed  - Assess need for intravenous fluids  - Provide specific nutrition/hydration education as appropriate  - Include patient/family/caregiver in decisions related to nutrition   Outcome: Progressing     Problem: PAIN - ADULT  Goal: Verbalizes/displays adequate comfort level or baseline comfort level  Description  Interventions:  - Encourage patient to monitor pain and request assistance  - Assess pain using appropriate pain scale; 0-10 pain scale  - Administer analgesics based on type and severity of pain and evaluate response  - Implement non-pharmacological measures as appropriate and evaluate response  - Consider cultural and social influences on pain and pain management  - Notify physician/advanced practitioner if interventions unsuccessful or patient reports new pain   Outcome: Progressing     Problem: INFECTION - ADULT  Goal: Absence or prevention of progression during hospitalization  Description  INTERVENTIONS:  - Assess and monitor for signs and symptoms of infection  - Monitor lab/diagnostic results  - Monitor all insertion sites, i e  indwelling lines, tubes, and drains  - Administer medications as ordered  - Instruct and encourage patient and family to use good hand hygiene technique   Outcome: Progressing     Problem: SAFETY ADULT  Goal: Maintain or return to baseline ADL function  Description  INTERVENTIONS:  -  Assess patient's ability to carry out ADLs; independent  - Assess patient's mobility; independent  - Encourage maximum independence but intervene and supervise when necessary  - Involve family in performance of ADLs  - Assess for home care needs following discharge   - Consider OT consult to assist with ADL evaluation and planning for discharge  - Provide patient education as appropriate   Outcome: Progressing  Goal: Maintain or return mobility status to optimal level  Description  INTERVENTIONS:  - Assess patient's baseline mobility status -Independent  - Identify cognitive and physical deficits and behaviors that affect mobility  - [place] fall precautions as indicated by assessment   Medium fall risk   - Record patient progress and toleration of activity level on Mobility SBAR; progress patient to next Phase/Stage  - Instruct patient to call for assistance with activity based on assessment  - Consider rehabilitation consult to assist with strengthening/weightbearing, etc    Outcome: Progressing     Problem: DISCHARGE PLANNING  Goal: Discharge to home or other facility with appropriate resources  Description  INTERVENTIONS:  - Identify barriers to discharge w/patient and caregiver  - Arrange for needed discharge resources and transportation as appropriate  - Identify discharge learning needs (meds, wound care, etc )  - Refer to Case Management Department for coordinating discharge planning if the patient needs post-hospital services based on physician/advanced practitioner order or complex needs related to functional status, cognitive ability, or social support system   Outcome: Progressing     Problem: Knowledge Deficit  Goal: Patient/family/caregiver demonstrates understanding of disease process, treatment plan, medications, and discharge instructions  Description  Complete learning assessment and assess knowledge base    Interventions:  - Provide teaching at level of understanding  - Provide teaching via preferred learning methods  Outcome: Progressing     Problem: GASTROINTESTINAL - ADULT  Goal: Maintains or returns to baseline bowel function  Description  INTERVENTIONS:  - Assess bowel function  - Encourage oral fluids to ensure adequate hydration  - Administer IV fluids if ordered to ensure adequate hydration  - Administer ordered medications as needed  - Encourage mobilization and activity  - Consider nutritional services referral to assist patient with adequate nutrition and appropriate food choices  Outcome: Progressing  Goal: Maintains adequate nutritional intake  Description  INTERVENTIONS:  - Monitor percentage of each meal consumed  - Identify factors contributing to decreased intake, treat as appropriate  - Assist with meals as needed  - Monitor I&O, weight, and lab values if indicated  - Obtain nutrition services referral as needed  Outcome: Progressing     Problem: METABOLIC, FLUID AND ELECTROLYTES - ADULT  Goal: Electrolytes maintained within normal limits  Description  INTERVENTIONS:  - Monitor labs and assess patient for signs and symptoms of electrolyte imbalances  - Administer electrolyte replacement as ordered  - Monitor response to electrolyte replacements, including repeat lab results as appropriate  - Instruct patient on fluid and nutrition as appropriate  Outcome: Progressing  Goal: Fluid balance maintained  Description  INTERVENTIONS:  - Monitor labs   - Monitor I/O and WT  - Instruct patient on fluid and nutrition as appropriate  - Assess for signs & symptoms of volume excess or deficit  Outcome: Progressing     Problem: DISCHARGE PLANNING - CARE MANAGEMENT  Goal: Discharge to post-acute care or home with appropriate resources  Description  INTERVENTIONS:  - Conduct assessment to determine patient/family and health care team treatment goals, and need for post-acute services based on payer coverage, community resources, and patient preferences, and barriers to discharge  - Address psychosocial, clinical, and financial barriers to discharge as identified in assessment in conjunction with the patient/family and health care team  - Arrange appropriate level of post-acute services according to patient's   needs and preference and payer coverage in collaboration with the physician and health care team  - Communicate with and update the patient/family, physician, and health care team regarding progress on the discharge plan  - Arrange appropriate transportation to post-acute venues  Pt will return home with       Outcome: Progressing

## 2019-09-11 NOTE — SOCIAL WORK
As per physician the patient is not medically ready for discharge  They will continue to wean her to IV Prednisolone   THey will continue to give her IV fluids  Will continue to follow patient

## 2019-09-12 ENCOUNTER — HOSPITAL ENCOUNTER (INPATIENT)
Facility: HOSPITAL | Age: 44
LOS: 5 days | Discharge: HOME | DRG: 392 | End: 2019-09-17
Attending: INTERNAL MEDICINE | Admitting: FAMILY MEDICINE
Payer: COMMERCIAL

## 2019-09-12 VITALS
SYSTOLIC BLOOD PRESSURE: 100 MMHG | HEIGHT: 65 IN | DIASTOLIC BLOOD PRESSURE: 65 MMHG | OXYGEN SATURATION: 97 % | BODY MASS INDEX: 21.83 KG/M2 | WEIGHT: 131 LBS | TEMPERATURE: 98 F | HEART RATE: 58 BPM | RESPIRATION RATE: 16 BRPM

## 2019-09-12 DIAGNOSIS — K51.911 EXACERBATION OF ULCERATIVE COLITIS WITH RECTAL BLEEDING (HCC): ICD-10-CM

## 2019-09-12 DIAGNOSIS — F51.04 PSYCHOPHYSIOLOGICAL INSOMNIA: ICD-10-CM

## 2019-09-12 DIAGNOSIS — R13.10 DYSPHAGIA, UNSPECIFIED TYPE: ICD-10-CM

## 2019-09-12 DIAGNOSIS — R13.10 ODYNOPHAGIA: Primary | ICD-10-CM

## 2019-09-12 PROBLEM — E44.1 MILD PROTEIN-CALORIE MALNUTRITION (HCC): Status: ACTIVE | Noted: 2019-09-12

## 2019-09-12 LAB
ALBUMIN SERPL BCP-MCNC: 3.2 G/DL (ref 3.5–5)
ALP SERPL-CCNC: 44 U/L (ref 46–116)
ALT SERPL W P-5'-P-CCNC: 62 U/L (ref 12–78)
ANION GAP SERPL CALCULATED.3IONS-SCNC: 7 MMOL/L (ref 4–13)
AST SERPL W P-5'-P-CCNC: 16 U/L (ref 5–45)
BASOPHILS # BLD AUTO: 0.02 THOUSANDS/ΜL (ref 0–0.1)
BASOPHILS NFR BLD AUTO: 0 % (ref 0–1)
BILIRUB SERPL-MCNC: 0.4 MG/DL (ref 0.2–1)
BUN SERPL-MCNC: 11 MG/DL (ref 5–25)
CALCIUM SERPL-MCNC: 8.6 MG/DL (ref 8.3–10.1)
CHLORIDE SERPL-SCNC: 100 MMOL/L (ref 100–108)
CO2 SERPL-SCNC: 29 MMOL/L (ref 21–32)
CREAT SERPL-MCNC: 0.73 MG/DL (ref 0.6–1.3)
EOSINOPHIL # BLD AUTO: 0 THOUSAND/ΜL (ref 0–0.61)
EOSINOPHIL NFR BLD AUTO: 0 % (ref 0–6)
ERYTHROCYTE [DISTWIDTH] IN BLOOD BY AUTOMATED COUNT: 12.9 % (ref 11.6–15.1)
GFR SERPL CREATININE-BSD FRML MDRD: 99 ML/MIN/1.73SQ M
GLUCOSE SERPL-MCNC: 124 MG/DL (ref 65–140)
HCT VFR BLD AUTO: 43.5 % (ref 34.8–46.1)
HGB BLD-MCNC: 14.6 G/DL (ref 11.5–15.4)
IMM GRANULOCYTES # BLD AUTO: 0.15 THOUSAND/UL (ref 0–0.2)
IMM GRANULOCYTES NFR BLD AUTO: 1 % (ref 0–2)
LYMPHOCYTES # BLD AUTO: 1.49 THOUSANDS/ΜL (ref 0.6–4.47)
LYMPHOCYTES NFR BLD AUTO: 9 % (ref 14–44)
MAGNESIUM SERPL-MCNC: 2.1 MG/DL (ref 1.6–2.6)
MCH RBC QN AUTO: 32.4 PG (ref 26.8–34.3)
MCHC RBC AUTO-ENTMCNC: 33.6 G/DL (ref 31.4–37.4)
MCV RBC AUTO: 97 FL (ref 82–98)
MONOCYTES # BLD AUTO: 1.06 THOUSAND/ΜL (ref 0.17–1.22)
MONOCYTES NFR BLD AUTO: 6 % (ref 4–12)
NEUTROPHILS # BLD AUTO: 13.92 THOUSANDS/ΜL (ref 1.85–7.62)
NEUTS SEG NFR BLD AUTO: 84 % (ref 43–75)
NRBC BLD AUTO-RTO: 0 /100 WBCS
PHOSPHATE SERPL-MCNC: 4.1 MG/DL (ref 2.7–4.5)
PLATELET # BLD AUTO: 388 THOUSANDS/UL (ref 149–390)
PMV BLD AUTO: 8.9 FL (ref 8.9–12.7)
POTASSIUM SERPL-SCNC: 4.2 MMOL/L (ref 3.5–5.3)
PROCALCITONIN SERPL-MCNC: <0.05 NG/ML
PROT SERPL-MCNC: 6.5 G/DL (ref 6.4–8.2)
RBC # BLD AUTO: 4.51 MILLION/UL (ref 3.81–5.12)
SCAN RESULT: NORMAL
SODIUM SERPL-SCNC: 136 MMOL/L (ref 136–145)
WBC # BLD AUTO: 16.64 THOUSAND/UL (ref 4.31–10.16)

## 2019-09-12 PROCEDURE — 99239 HOSP IP/OBS DSCHRG MGMT >30: CPT | Performed by: INTERNAL MEDICINE

## 2019-09-12 PROCEDURE — 84145 PROCALCITONIN (PCT): CPT | Performed by: INTERNAL MEDICINE

## 2019-09-12 PROCEDURE — 83735 ASSAY OF MAGNESIUM: CPT | Performed by: INTERNAL MEDICINE

## 2019-09-12 PROCEDURE — 84100 ASSAY OF PHOSPHORUS: CPT | Performed by: INTERNAL MEDICINE

## 2019-09-12 PROCEDURE — 99223 1ST HOSP IP/OBS HIGH 75: CPT | Performed by: FAMILY MEDICINE

## 2019-09-12 PROCEDURE — 80053 COMPREHEN METABOLIC PANEL: CPT | Performed by: INTERNAL MEDICINE

## 2019-09-12 PROCEDURE — 85025 COMPLETE CBC W/AUTO DIFF WBC: CPT | Performed by: INTERNAL MEDICINE

## 2019-09-12 RX ORDER — RANITIDINE 150 MG/1
300 TABLET ORAL
Status: CANCELLED | OUTPATIENT
Start: 2019-09-12

## 2019-09-12 RX ORDER — ONDANSETRON 2 MG/ML
4 INJECTION INTRAMUSCULAR; INTRAVENOUS EVERY 6 HOURS PRN
Status: DISCONTINUED | OUTPATIENT
Start: 2019-09-12 | End: 2019-09-12

## 2019-09-12 RX ORDER — SACCHAROMYCES BOULARDII 250 MG
250 CAPSULE ORAL 2 TIMES DAILY
Status: CANCELLED | OUTPATIENT
Start: 2019-09-12

## 2019-09-12 RX ORDER — DIPHENHYDRAMINE HYDROCHLORIDE 50 MG/ML
50 INJECTION INTRAMUSCULAR; INTRAVENOUS EVERY 4 HOURS PRN
Status: DISCONTINUED | OUTPATIENT
Start: 2019-09-12 | End: 2019-09-14

## 2019-09-12 RX ORDER — HYDROMORPHONE HCL/PF 1 MG/ML
0.5 SYRINGE (ML) INJECTION EVERY 4 HOURS PRN
Status: CANCELLED | OUTPATIENT
Start: 2019-09-12

## 2019-09-12 RX ORDER — BUTALBITAL, ACETAMINOPHEN AND CAFFEINE 50; 325; 40 MG/1; MG/1; MG/1
1 TABLET ORAL EVERY 8 HOURS PRN
Status: DISCONTINUED | OUTPATIENT
Start: 2019-09-12 | End: 2019-09-17 | Stop reason: HOSPADM

## 2019-09-12 RX ORDER — ONDANSETRON 2 MG/ML
4 INJECTION INTRAMUSCULAR; INTRAVENOUS EVERY 6 HOURS PRN
Status: DISCONTINUED | OUTPATIENT
Start: 2019-09-12 | End: 2019-09-17 | Stop reason: HOSPADM

## 2019-09-12 RX ORDER — METOCLOPRAMIDE HYDROCHLORIDE 5 MG/ML
5 INJECTION INTRAMUSCULAR; INTRAVENOUS
Status: DISCONTINUED | OUTPATIENT
Start: 2019-09-12 | End: 2019-09-17 | Stop reason: HOSPADM

## 2019-09-12 RX ORDER — SODIUM CHLORIDE 9 MG/ML
75 INJECTION, SOLUTION INTRAVENOUS CONTINUOUS
Status: CANCELLED | OUTPATIENT
Start: 2019-09-12

## 2019-09-12 RX ORDER — XYLITOL/YERBA SANTA
5 AEROSOL, SPRAY WITH PUMP (ML) MUCOUS MEMBRANE 3 TIMES DAILY
Status: DISCONTINUED | OUTPATIENT
Start: 2019-09-12 | End: 2019-09-17 | Stop reason: HOSPADM

## 2019-09-12 RX ORDER — TEMAZEPAM 15 MG/1
30 CAPSULE ORAL
Status: DISCONTINUED | OUTPATIENT
Start: 2019-09-12 | End: 2019-09-12 | Stop reason: SDUPTHER

## 2019-09-12 RX ORDER — LIDOCAINE 50 MG/G
2 PATCH TOPICAL DAILY
Status: CANCELLED | OUTPATIENT
Start: 2019-09-13

## 2019-09-12 RX ORDER — LORAZEPAM 1 MG/1
1 TABLET ORAL EVERY 8 HOURS PRN
Status: DISCONTINUED | OUTPATIENT
Start: 2019-09-12 | End: 2019-09-12

## 2019-09-12 RX ORDER — SODIUM CHLORIDE 9 MG/ML
125 INJECTION, SOLUTION INTRAVENOUS CONTINUOUS
Status: DISCONTINUED | OUTPATIENT
Start: 2019-09-12 | End: 2019-09-12

## 2019-09-12 RX ORDER — MONTELUKAST SODIUM 10 MG/1
10 TABLET ORAL 2 TIMES DAILY
Status: DISCONTINUED | OUTPATIENT
Start: 2019-09-12 | End: 2019-09-17 | Stop reason: HOSPADM

## 2019-09-12 RX ORDER — METOCLOPRAMIDE HYDROCHLORIDE 5 MG/ML
5 INJECTION INTRAMUSCULAR; INTRAVENOUS
Status: CANCELLED | OUTPATIENT
Start: 2019-09-12

## 2019-09-12 RX ORDER — HYDROMORPHONE HCL/PF 1 MG/ML
0.2 SYRINGE (ML) INJECTION EVERY 4 HOURS PRN
Status: DISCONTINUED | OUTPATIENT
Start: 2019-09-12 | End: 2019-09-14

## 2019-09-12 RX ORDER — SIMETHICONE 80 MG
80 TABLET,CHEWABLE ORAL EVERY 6 HOURS PRN
Status: CANCELLED | OUTPATIENT
Start: 2019-09-12

## 2019-09-12 RX ORDER — HYDROMORPHONE HCL/PF 1 MG/ML
0.5 SYRINGE (ML) INJECTION EVERY 4 HOURS PRN
Status: DISCONTINUED | OUTPATIENT
Start: 2019-09-12 | End: 2019-09-14

## 2019-09-12 RX ORDER — METHYLPREDNISOLONE SODIUM SUCCINATE 40 MG/ML
40 INJECTION, POWDER, LYOPHILIZED, FOR SOLUTION INTRAMUSCULAR; INTRAVENOUS EVERY 8 HOURS SCHEDULED
Status: DISCONTINUED | OUTPATIENT
Start: 2019-09-12 | End: 2019-09-13

## 2019-09-12 RX ORDER — HYDROXYZINE HCL 10 MG/5 ML
25 SOLUTION, ORAL ORAL ONCE
Status: DISCONTINUED | OUTPATIENT
Start: 2019-09-12 | End: 2019-09-12

## 2019-09-12 RX ORDER — HYDROXYZINE HYDROCHLORIDE 25 MG/1
25 TABLET, FILM COATED ORAL ONCE
Status: COMPLETED | OUTPATIENT
Start: 2019-09-12 | End: 2019-09-12

## 2019-09-12 RX ORDER — MONTELUKAST SODIUM 10 MG/1
10 TABLET ORAL 2 TIMES DAILY
Status: DISCONTINUED | OUTPATIENT
Start: 2019-09-12 | End: 2019-09-12

## 2019-09-12 RX ORDER — POLYETHYLENE GLYCOL 3350 17 G/17G
17 POWDER, FOR SOLUTION ORAL 2 TIMES DAILY
Status: DISCONTINUED | OUTPATIENT
Start: 2019-09-12 | End: 2019-09-17 | Stop reason: HOSPADM

## 2019-09-12 RX ORDER — MONTELUKAST SODIUM 10 MG/1
10 TABLET ORAL 2 TIMES DAILY
Status: CANCELLED | OUTPATIENT
Start: 2019-09-12

## 2019-09-12 RX ORDER — SIMETHICONE 80 MG
80 TABLET,CHEWABLE ORAL EVERY 6 HOURS PRN
Status: DISCONTINUED | OUTPATIENT
Start: 2019-09-12 | End: 2019-09-17 | Stop reason: HOSPADM

## 2019-09-12 RX ORDER — LIDOCAINE 50 MG/G
2 PATCH TOPICAL DAILY
Status: DISCONTINUED | OUTPATIENT
Start: 2019-09-13 | End: 2019-09-17 | Stop reason: HOSPADM

## 2019-09-12 RX ORDER — ACETAMINOPHEN 325 MG/1
650 TABLET ORAL EVERY 6 HOURS PRN
Status: DISCONTINUED | OUTPATIENT
Start: 2019-09-12 | End: 2019-09-17 | Stop reason: HOSPADM

## 2019-09-12 RX ORDER — CETIRIZINE HYDROCHLORIDE 10 MG/1
20 TABLET ORAL 2 TIMES DAILY
Status: CANCELLED | OUTPATIENT
Start: 2019-09-12

## 2019-09-12 RX ORDER — LORATADINE 10 MG/1
10 TABLET ORAL DAILY
Status: DISCONTINUED | OUTPATIENT
Start: 2019-09-13 | End: 2019-09-12

## 2019-09-12 RX ORDER — POLYETHYLENE GLYCOL 3350 17 G/17G
17 POWDER, FOR SOLUTION ORAL 2 TIMES DAILY
Status: CANCELLED | OUTPATIENT
Start: 2019-09-12

## 2019-09-12 RX ORDER — PANTOPRAZOLE SODIUM 40 MG/1
40 TABLET, DELAYED RELEASE ORAL
Status: DISCONTINUED | OUTPATIENT
Start: 2019-09-13 | End: 2019-09-12

## 2019-09-12 RX ORDER — DIPHENHYDRAMINE HYDROCHLORIDE 50 MG/ML
50 INJECTION INTRAMUSCULAR; INTRAVENOUS EVERY 4 HOURS PRN
Status: CANCELLED | OUTPATIENT
Start: 2019-09-12

## 2019-09-12 RX ORDER — PANTOPRAZOLE SODIUM 40 MG/1
40 TABLET, DELAYED RELEASE ORAL
Status: DISCONTINUED | OUTPATIENT
Start: 2019-09-13 | End: 2019-09-17 | Stop reason: HOSPADM

## 2019-09-12 RX ORDER — LEVOTHYROXINE SODIUM 0.05 MG/1
50 TABLET ORAL
Status: DISCONTINUED | OUTPATIENT
Start: 2019-09-13 | End: 2019-09-17 | Stop reason: HOSPADM

## 2019-09-12 RX ORDER — MELATONIN
1000 DAILY
Status: DISCONTINUED | OUTPATIENT
Start: 2019-09-13 | End: 2019-09-12

## 2019-09-12 RX ORDER — TEMAZEPAM 15 MG/1
30 CAPSULE ORAL
Status: DISCONTINUED | OUTPATIENT
Start: 2019-09-12 | End: 2019-09-12

## 2019-09-12 RX ORDER — CETIRIZINE HYDROCHLORIDE 10 MG/1
20 TABLET ORAL 2 TIMES DAILY
Status: DISCONTINUED | OUTPATIENT
Start: 2019-09-12 | End: 2019-09-17 | Stop reason: HOSPADM

## 2019-09-12 RX ORDER — LORAZEPAM 1 MG/1
1 TABLET ORAL EVERY 8 HOURS PRN
Status: DISCONTINUED | OUTPATIENT
Start: 2019-09-12 | End: 2019-09-17 | Stop reason: HOSPADM

## 2019-09-12 RX ORDER — TEMAZEPAM 15 MG/1
30 CAPSULE ORAL
Status: DISCONTINUED | OUTPATIENT
Start: 2019-09-12 | End: 2019-09-17 | Stop reason: HOSPADM

## 2019-09-12 RX ORDER — BUTALBITAL, ACETAMINOPHEN AND CAFFEINE 50; 325; 40 MG/1; MG/1; MG/1
1 TABLET ORAL EVERY 8 HOURS PRN
Status: DISCONTINUED | OUTPATIENT
Start: 2019-09-12 | End: 2019-09-12

## 2019-09-12 RX ORDER — ACETAMINOPHEN 325 MG/1
650 TABLET ORAL EVERY 6 HOURS PRN
Status: CANCELLED | OUTPATIENT
Start: 2019-09-12

## 2019-09-12 RX ORDER — ONDANSETRON 2 MG/ML
4 INJECTION INTRAMUSCULAR; INTRAVENOUS EVERY 6 HOURS PRN
Status: CANCELLED | OUTPATIENT
Start: 2019-09-12

## 2019-09-12 RX ORDER — TEMAZEPAM 15 MG/1
30 CAPSULE ORAL
Status: CANCELLED | OUTPATIENT
Start: 2019-09-12

## 2019-09-12 RX ORDER — XYLITOL/YERBA SANTA
5 AEROSOL, SPRAY WITH PUMP (ML) MUCOUS MEMBRANE 3 TIMES DAILY
Status: CANCELLED | OUTPATIENT
Start: 2019-09-12

## 2019-09-12 RX ORDER — FAMOTIDINE 20 MG/1
20 TABLET, FILM COATED ORAL 2 TIMES DAILY
Status: DISCONTINUED | OUTPATIENT
Start: 2019-09-12 | End: 2019-09-12

## 2019-09-12 RX ORDER — SACCHAROMYCES BOULARDII 250 MG
250 CAPSULE ORAL 2 TIMES DAILY
Status: DISCONTINUED | OUTPATIENT
Start: 2019-09-12 | End: 2019-09-17 | Stop reason: HOSPADM

## 2019-09-12 RX ORDER — PANTOPRAZOLE SODIUM 40 MG/1
40 TABLET, DELAYED RELEASE ORAL
Status: CANCELLED | OUTPATIENT
Start: 2019-09-12

## 2019-09-12 RX ORDER — SODIUM CHLORIDE 9 MG/ML
100 INJECTION, SOLUTION INTRAVENOUS CONTINUOUS
Status: DISCONTINUED | OUTPATIENT
Start: 2019-09-12 | End: 2019-09-17 | Stop reason: HOSPADM

## 2019-09-12 RX ORDER — BUTALBITAL, ACETAMINOPHEN AND CAFFEINE 50; 325; 40 MG/1; MG/1; MG/1
1 TABLET ORAL EVERY 8 HOURS PRN
Status: CANCELLED | OUTPATIENT
Start: 2019-09-12

## 2019-09-12 RX ORDER — HYDROMORPHONE HCL/PF 1 MG/ML
0.2 SYRINGE (ML) INJECTION EVERY 4 HOURS PRN
Status: CANCELLED | OUTPATIENT
Start: 2019-09-12

## 2019-09-12 RX ORDER — LORAZEPAM 1 MG/1
1 TABLET ORAL EVERY 8 HOURS PRN
Status: CANCELLED | OUTPATIENT
Start: 2019-09-12

## 2019-09-12 RX ORDER — RANITIDINE 150 MG/1
300 TABLET ORAL
Status: DISCONTINUED | OUTPATIENT
Start: 2019-09-13 | End: 2019-09-17 | Stop reason: HOSPADM

## 2019-09-12 RX ADMIN — SODIUM CHLORIDE 75 ML/HR: 0.9 INJECTION, SOLUTION INTRAVENOUS at 23:00

## 2019-09-12 RX ADMIN — CETIRIZINE HYDROCHLORIDE 20 MG: 10 TABLET ORAL at 13:45

## 2019-09-12 RX ADMIN — DIPHENHYDRAMINE HYDROCHLORIDE 50 MG: 50 INJECTION, SOLUTION INTRAMUSCULAR; INTRAVENOUS at 20:25

## 2019-09-12 RX ADMIN — METHYLNALTREXONE BROMIDE 8 MG: 8 INJECTION, SOLUTION SUBCUTANEOUS at 21:40

## 2019-09-12 RX ADMIN — METHYLPREDNISOLONE SODIUM SUCCINATE 40 MG: 40 INJECTION, POWDER, FOR SOLUTION INTRAMUSCULAR; INTRAVENOUS at 23:50

## 2019-09-12 RX ADMIN — METHYLPREDNISOLONE SODIUM SUCCINATE 40 MG: 40 INJECTION, POWDER, FOR SOLUTION INTRAMUSCULAR; INTRAVENOUS at 08:26

## 2019-09-12 RX ADMIN — LORAZEPAM 1 MG: 1 TABLET ORAL at 23:50

## 2019-09-12 RX ADMIN — HYDROMORPHONE HYDROCHLORIDE 0.5 MG: 1 INJECTION, SOLUTION INTRAMUSCULAR; INTRAVENOUS; SUBCUTANEOUS at 13:46

## 2019-09-12 RX ADMIN — MONTELUKAST 10 MG: 10 TABLET, FILM COATED ORAL at 13:46

## 2019-09-12 RX ADMIN — POLYETHYLENE GLYCOL 3350 17 G: 17 POWDER, FOR SOLUTION ORAL at 08:27

## 2019-09-12 RX ADMIN — DIPHENHYDRAMINE HYDROCHLORIDE 50 MG: 50 INJECTION, SOLUTION INTRAMUSCULAR; INTRAVENOUS at 15:49

## 2019-09-12 RX ADMIN — DIPHENHYDRAMINE HYDROCHLORIDE 50 MG: 50 INJECTION, SOLUTION INTRAMUSCULAR; INTRAVENOUS at 11:20

## 2019-09-12 RX ADMIN — ENOXAPARIN SODIUM 40 MG: 40 INJECTION SUBCUTANEOUS at 08:26

## 2019-09-12 RX ADMIN — LORAZEPAM 1 MG: 1 TABLET ORAL at 04:55

## 2019-09-12 RX ADMIN — Medication 250 MG: at 20:22

## 2019-09-12 RX ADMIN — DIPHENHYDRAMINE HYDROCHLORIDE 50 MG: 50 INJECTION, SOLUTION INTRAMUSCULAR; INTRAVENOUS at 07:28

## 2019-09-12 RX ADMIN — NYSTATIN 500000 UNITS: 100000 SUSPENSION ORAL at 21:39

## 2019-09-12 RX ADMIN — DIPHENHYDRAMINE HYDROCHLORIDE 50 MG: 50 INJECTION, SOLUTION INTRAMUSCULAR; INTRAVENOUS at 03:16

## 2019-09-12 RX ADMIN — HYDROMORPHONE HYDROCHLORIDE 0.5 MG: 1 INJECTION, SOLUTION INTRAMUSCULAR; INTRAVENOUS; SUBCUTANEOUS at 03:36

## 2019-09-12 RX ADMIN — METOCLOPRAMIDE 5 MG: 5 INJECTION, SOLUTION INTRAMUSCULAR; INTRAVENOUS at 23:50

## 2019-09-12 RX ADMIN — Medication 250 MG: at 08:27

## 2019-09-12 RX ADMIN — HYDROMORPHONE HYDROCHLORIDE 0.5 MG: 1 INJECTION, SOLUTION INTRAMUSCULAR; INTRAVENOUS; SUBCUTANEOUS at 21:39

## 2019-09-12 RX ADMIN — MONTELUKAST SODIUM 10 MG: 10 TABLET, COATED ORAL at 21:43

## 2019-09-12 RX ADMIN — POLYETHYLENE GLYCOL 3350 17 G: 17 POWDER, FOR SOLUTION ORAL at 20:22

## 2019-09-12 RX ADMIN — METOCLOPRAMIDE 5 MG: 5 INJECTION, SOLUTION INTRAMUSCULAR; INTRAVENOUS at 11:13

## 2019-09-12 RX ADMIN — SODIUM CHLORIDE 75 ML/HR: 0.9 INJECTION, SOLUTION INTRAVENOUS at 08:30

## 2019-09-12 RX ADMIN — RANITIDINE 300 MG: 150 TABLET ORAL at 11:13

## 2019-09-12 RX ADMIN — TEMAZEPAM 30 MG: 15 CAPSULE ORAL at 23:28

## 2019-09-12 RX ADMIN — CETIRIZINE HYDROCHLORIDE 20 MG: 10 TABLET, FILM COATED ORAL at 21:40

## 2019-09-12 RX ADMIN — METOCLOPRAMIDE 5 MG: 5 INJECTION, SOLUTION INTRAMUSCULAR; INTRAVENOUS at 06:39

## 2019-09-12 RX ADMIN — Medication 5 SPRAY: at 08:37

## 2019-09-12 RX ADMIN — HYDROXYZINE HYDROCHLORIDE 25 MG: 25 TABLET ORAL at 04:50

## 2019-09-12 RX ADMIN — HYDROMORPHONE HYDROCHLORIDE 0.5 MG: 1 INJECTION, SOLUTION INTRAMUSCULAR; INTRAVENOUS; SUBCUTANEOUS at 08:27

## 2019-09-12 RX ADMIN — LORAZEPAM 1 MG: 1 TABLET ORAL at 13:46

## 2019-09-12 RX ADMIN — PANTOPRAZOLE SODIUM 40 MG: 40 TABLET, DELAYED RELEASE ORAL at 11:13

## 2019-09-12 RX ADMIN — LIDOCAINE 2 PATCH: 50 PATCH TOPICAL at 08:26

## 2019-09-12 NOTE — ASSESSMENT & PLAN NOTE
Results for Zach Vigil (MRN [id number]) as of 9/11/2019 13:20   Ref  Range 9/3/2019 05:47   LD ([facility]) Latest Ref Range: 81 - 234 U/L 220     US extremity soft tissue   Status: Final result   PACS Images      Show images for US extremity soft tissue   Study Result     LEFT AXILLARY ULTRASOUND     INDICATION:   R52: Pain, unspecified  M79 89: Other specified soft tissue disorders      COMPARISON:  Ultrasound from 9/11/2018, CT a chest, abdomen and pelvis 2/12/2019 and report of mammogram from 6/26/2019     TECHNIQUE:   Real-time ultrasound of the left axilla was performed with a linear transducer with both volumetric sweeps and still imaging techniques      FINDINGS:  There are no fluid collections or suspicious masses detected  There are a few left axillary lymph nodes  A slightly prominent lymph node measures about 1 4 cm short axis  The lymph nodes otherwise demonstrate normal intrinsic appearance  No skin thickening seen  IMPRESSION:     No fluid collections or suspicious masses detected      Left axillary lymph nodes, 1 of which is mildly prominent in size, however, all visualized lymph nodes demonstrate normal intrinsic morphology  This is of doubtful clinical significance  Follow-up if any may be based on clinical grounds       · Outpatient surveillance imaging with PCP

## 2019-09-12 NOTE — ASSESSMENT & PLAN NOTE
Patient has had progressively worsening odynophagia secondary to a tonsillectomy performed in April 2019  She is having difficulty with oral food and fluid intake  She was offered an ENT evaluation as an outpatient but is requesting inpatient ENT evaluation  For this reason, she was transferred to [facility] because [facility] does not have access to ENT consult services  ENT consult has been placed  The patient will also need a video barium swallow test performed

## 2019-09-12 NOTE — ASSESSMENT & PLAN NOTE
· Progressively worsening since a tonsillectomy in May of 2019  · The patient is having difficulty with PO food and fluid intake  · The patient needs to be evaluated by ENT  · The patient was offered an ENT evaluation as an outpatient, but the patient is demanding an inpatient ENT evaluation  · She will also need a barium video swallow test completed    The patient is requesting an inpatient ENT evaluation, which is not available at [address] [place] [address]  The patient will be transferred to [address] [place] [address] on the service of Dr Wilver Ramos [facility] Attending Physician)

## 2019-09-12 NOTE — ASSESSMENT & PLAN NOTE
· The methylprednisolone was decreased to 40 mg IV every 12 hours on 09/10/2019  · Transition IV methylprednisolone to PO prednisone  · On chronic Remicade treatment  · The patient was seen in consultation by Gastroenterology  · Continue NSS IV fluids  · The patient is at high risk for DVT/venous thromboembolism in the setting of chronic ulcerative colitis, so she will be placed on lovenox 40 mg SQ every 24 hours DVT prophylaxis even in the setting of rectal bleeding  · The patient underwent a colonoscopy on 08/30/2019 by Gastroenterology with the following results/impression:  IMPRESSION:  Moderately severe proctosigmoiditis -2 linear ulcers noted in the mid rectum  Biopsies were taken to rule out CMV  Random biopsies were taken from ascending, transverse, descending and sigmoid colon      RECOMMENDATION:  Await pathology  Preliminary recommendation repeat in 1 yr  Follow-up with Dr Bishop Zapata current treatment    · The patient is requesting to be followed and managed by Gastroenterology on a daily basis, which is not available at [address] [place] [address]  The patient will be transferred to [address] [place] [address] on the service of Dr Wilver Ramos [facility] Attending Physician)

## 2019-09-12 NOTE — PLAN OF CARE
Problem: Potential for Falls  Goal: Patient will remain free of falls  Description  INTERVENTIONS:  - Assess patient frequently for physical needs  -  Identify cognitive and physical deficits and behaviors that affect risk of falls  -  [place] fall precautions as indicated by assessment  Medium fall risk   - Educate patient/family on patient safety including physical limitations  - Instruct patient to call for assistance with activity based on assessment  - Modify environment to reduce risk of injury  - Consider OT/PT consult to assist with strengthening/mobility   Outcome: Progressing     Problem: Nutrition/Hydration-ADULT  Goal: Nutrient/Hydration intake appropriate for improving, restoring or maintaining nutritional needs  Description  Monitor and assess patient's nutrition/hydration status for malnutrition  Collaborate with interdisciplinary team and initiate plan and interventions as ordered  Monitor patient's weight and dietary intake as ordered or per policy  Utilize nutrition screening tool and intervene as necessary  Determine patient's food preferences and provide high-protein, high-caloric foods as appropriate       INTERVENTIONS:  - Monitor oral intake, urinary output, labs, and treatment plans  - Assess nutrition and hydration status and recommend course of action  - Evaluate amount of meals eaten  - Allow adequate time for meals  - Recommend/ encourage appropriate diets, oral nutritional supplements, and vitamin/mineral supplements  - Order, calculate, and assess calorie counts as needed  - Assess need for intravenous fluids  - Provide specific nutrition/hydration education as appropriate  - Include patient/family/caregiver in decisions related to nutrition   Outcome: Progressing     Problem: PAIN - ADULT  Goal: Verbalizes/displays adequate comfort level or baseline comfort level  Description  Interventions:  - Encourage patient to monitor pain and request assistance  - Assess pain using appropriate pain scale; 0-10 pain scale  - Administer analgesics based on type and severity of pain and evaluate response  - Implement non-pharmacological measures as appropriate and evaluate response  - Consider cultural and social influences on pain and pain management  - Notify physician/advanced practitioner if interventions unsuccessful or patient reports new pain   Outcome: Progressing     Problem: INFECTION - ADULT  Goal: Absence or prevention of progression during hospitalization  Description  INTERVENTIONS:  - Assess and monitor for signs and symptoms of infection  - Monitor lab/diagnostic results  - Monitor all insertion sites, i e  indwelling lines, tubes, and drains  - Administer medications as ordered  - Instruct and encourage patient and family to use good hand hygiene technique   Outcome: Progressing     Problem: SAFETY ADULT  Goal: Maintain or return to baseline ADL function  Description  INTERVENTIONS:  -  Assess patient's ability to carry out ADLs; independent  - Assess patient's mobility; independent  - Encourage maximum independence but intervene and supervise when necessary  - Involve family in performance of ADLs  - Assess for home care needs following discharge   - Consider OT consult to assist with ADL evaluation and planning for discharge  - Provide patient education as appropriate   Outcome: Progressing  Goal: Maintain or return mobility status to optimal level  Description  INTERVENTIONS:  - Assess patient's baseline mobility status -Independent  - Identify cognitive and physical deficits and behaviors that affect mobility  - [place] fall precautions as indicated by assessment   Medium fall risk   - Record patient progress and toleration of activity level on Mobility SBAR; progress patient to next Phase/Stage  - Instruct patient to call for assistance with activity based on assessment  - Consider rehabilitation consult to assist with strengthening/weightbearing, etc    Outcome: Progressing     Problem: DISCHARGE PLANNING  Goal: Discharge to home or other facility with appropriate resources  Description  INTERVENTIONS:  - Identify barriers to discharge w/patient and caregiver  - Arrange for needed discharge resources and transportation as appropriate  - Identify discharge learning needs (meds, wound care, etc )  - Refer to Case Management Department for coordinating discharge planning if the patient needs post-hospital services based on physician/advanced practitioner order or complex needs related to functional status, cognitive ability, or social support system   Outcome: Progressing     Problem: Knowledge Deficit  Goal: Patient/family/caregiver demonstrates understanding of disease process, treatment plan, medications, and discharge instructions  Description  Complete learning assessment and assess knowledge base    Interventions:  - Provide teaching at level of understanding  - Provide teaching via preferred learning methods  Outcome: Progressing     Problem: GASTROINTESTINAL - ADULT  Goal: Maintains or returns to baseline bowel function  Description  INTERVENTIONS:  - Assess bowel function  - Encourage oral fluids to ensure adequate hydration  - Administer IV fluids if ordered to ensure adequate hydration  - Administer ordered medications as needed  - Encourage mobilization and activity  - Consider nutritional services referral to assist patient with adequate nutrition and appropriate food choices  Outcome: Progressing  Goal: Maintains adequate nutritional intake  Description  INTERVENTIONS:  - Monitor percentage of each meal consumed  - Identify factors contributing to decreased intake, treat as appropriate  - Assist with meals as needed  - Monitor I&O, weight, and lab values if indicated  - Obtain nutrition services referral as needed  Outcome: Progressing     Problem: METABOLIC, FLUID AND ELECTROLYTES - ADULT  Goal: Electrolytes maintained within normal limits  Description  INTERVENTIONS:  - Monitor labs and assess patient for signs and symptoms of electrolyte imbalances  - Administer electrolyte replacement as ordered  - Monitor response to electrolyte replacements, including repeat lab results as appropriate  - Instruct patient on fluid and nutrition as appropriate  Outcome: Progressing  Goal: Fluid balance maintained  Description  INTERVENTIONS:  - Monitor labs   - Monitor I/O and WT  - Instruct patient on fluid and nutrition as appropriate  - Assess for signs & symptoms of volume excess or deficit  Outcome: Progressing     Problem: DISCHARGE PLANNING - CARE MANAGEMENT  Goal: Discharge to post-acute care or home with appropriate resources  Description  INTERVENTIONS:  - Conduct assessment to determine patient/family and health care team treatment goals, and need for post-acute services based on payer coverage, community resources, and patient preferences, and barriers to discharge  - Address psychosocial, clinical, and financial barriers to discharge as identified in assessment in conjunction with the patient/family and health care team  - Arrange appropriate level of post-acute services according to patient's   needs and preference and payer coverage in collaboration with the physician and health care team  - Communicate with and update the patient/family, physician, and health care team regarding progress on the discharge plan  - Arrange appropriate transportation to post-acute venues  Pt will return home with       Outcome: Progressing

## 2019-09-12 NOTE — DISCHARGE SUMMARY
Discharge- Demetri Expose 1975, 53 y o  female MRN: [id number]    Unit/Bed#: 405-01 Encounter: [id number]    Primary Care Provider: Ministerio Pino PA-C   Date and time admitted to hospital: 8/26/2019  3:12 PM        * Severe sepsis [facility])  Assessment & Plan  · Severe sepsis was present on admission and secondary to gram-negative bacteremia  · The patient was initially treated with IV cefepime  · The patient was seen in consultation by Infectious Disease  · The lactic acidosis has resolved  · The patient was transitioned to levofloxacin 750 mg PO every 24 hours on 08/31/2019  · The patient completed a 14-day antibiotic course during the hospitalization per Infectious Disease's recommendations    · Progressively worsening since a tonsillectomy in May of 2019  · The patient is having difficulty with PO food and fluid intake  · The patient needs to be evaluated by ENT  · The patient was offered an ENT evaluation as an outpatient, but the patient is demanding an inpatient ENT evaluation  · She will also need a barium video swallow test completed    The patient is requesting to be evaluated by Infectious Disease on a daily basis, which is not available at [address] [place] [address]  The patient will be transferred to [address] [place] [address] on the service of Dr Kaitlynn Donis [facility] Attending Physician)  Dysphagia  Assessment & Plan  · Progressively worsening since a tonsillectomy in May of 2019  · The patient is having difficulty with PO food and fluid intake  · The patient needs to be evaluated by ENT  · The patient was offered an ENT evaluation as an outpatient, but the patient is demanding an inpatient ENT evaluation  · She will also need a barium video swallow test completed    The patient is requesting an inpatient ENT evaluation, which is not available at [address] [place] [address]    The patient will be transferred to [place] [address] [address] [facility] on the service of Dr Jai Mayberry [facility] Attending Physician)  Odynophagia  Assessment & Plan  · Progressively worsening since a tonsillectomy in May of 2019  · The patient is having difficulty with PO food and fluid intake  · The patient needs to be evaluated by ENT  · The patient was offered an ENT evaluation as an outpatient, but the patient is demanding an inpatient ENT evaluation  · She will also need a barium video swallow test completed    The patient is requesting an inpatient ENT evaluation, which is not available at [address] [place] [address]  The patient will be transferred to [address] [place] [address] on the service of Dr Jai Mayberry [facility] Attending Physician)      Bacteremia due to Gram-negative bacteria  Assessment & Plan  · Severe sepsis was present on admission and secondary to gram-negative bacteremia  · The patient was initially treated with IV cefepime  · The patient was seen in consultation by Infectious Disease  · The lactic acidosis has resolved  · The patient was transitioned to levofloxacin 750 mg PO every 24 hours on 08/31/2019  · The patient completed a 14-day antibiotic course during the hospitalization per Infectious Disease's recommendations      Exacerbation of ulcerative colitis with rectal bleeding ([facility] [address] )  Assessment & Plan  · The methylprednisolone was decreased to 40 mg IV every 12 hours on 09/10/2019  · Transition IV methylprednisolone to PO prednisone  · On chronic Remicade treatment  · The patient was seen in consultation by Gastroenterology  · Continue NSS IV fluids  · The patient is at high risk for DVT/venous thromboembolism in the setting of chronic ulcerative colitis, so she will be placed on lovenox 40 mg SQ every 24 hours DVT prophylaxis even in the setting of rectal bleeding  · The patient underwent a colonoscopy on 08/30/2019 by Gastroenterology with the following results/impression:  IMPRESSION:  Moderately severe proctosigmoiditis -2 linear ulcers noted in the mid rectum  Biopsies were taken to rule out CMV  Random biopsies were taken from ascending, transverse, descending and sigmoid colon      RECOMMENDATION:  Await pathology  Preliminary recommendation repeat in 1 yr  Follow-up with Dr Burger Neither current treatment    · The patient is requesting to be followed and managed by Gastroenterology on a daily basis, which is not available at [address] [place] [address]  The patient will be transferred to [address] [place] [address] on the service of Dr Jenny Rodriguez [facility] Attending Physician)  Mild protein-calorie malnutrition ([facility] [address] )  Assessment & Plan  Malnutrition Findings:   Malnutrition type: Acute illness  Degree of Malnutrition: Malnutrition of mild degree    BMI Findings: Body mass index is 21 8 kg/m²  Constipation  Assessment & Plan  · Continue miralax 17 grams PO BID per Gastroenterology    Mass of left axilla  Assessment & Plan  Results for Erin Ocampo (MRN [id number]) as of 9/11/2019 13:20   Ref  Range 9/3/2019 05:47   LD ([facility]) Latest Ref Range: 81 - 234 U/L 220     US extremity soft tissue   Status: Final result   PACS Images      Show images for US extremity soft tissue   Study Result     LEFT AXILLARY ULTRASOUND     INDICATION:   R52: Pain, unspecified  M79 89: Other specified soft tissue disorders      COMPARISON:  Ultrasound from 9/11/2018, CT a chest, abdomen and pelvis 2/12/2019 and report of mammogram from 6/26/2019     TECHNIQUE:   Real-time ultrasound of the left axilla was performed with a linear transducer with both volumetric sweeps and still imaging techniques      FINDINGS:  There are no fluid collections or suspicious masses detected  There are a few left axillary lymph nodes  A slightly prominent lymph node measures about 1 4 cm short axis    The lymph nodes otherwise demonstrate normal intrinsic appearance  No skin thickening seen  IMPRESSION:     No fluid collections or suspicious masses detected      Left axillary lymph nodes, 1 of which is mildly prominent in size, however, all visualized lymph nodes demonstrate normal intrinsic morphology  This is of doubtful clinical significance  Follow-up if any may be based on clinical grounds  · Outpatient surveillance imaging with PCP    Immunosuppressed status ([facility] [address] )  Assessment & Plan  · The patient is currently receiving IVIG treatment  · Also on chronic Remicade treatment      Mast cell activation syndrome [facility])  Assessment & Plan  · The patient is seen regularly at [facility]  · Continue her medication regimen per [facility]  Vitamin D deficiency  Assessment & Plan  · Outpatient follow-up with PCP in regards to this matter  An ENT evaluation was recommended for the patient, and the patient demands an inpatient ENT evaluation for her odynophagia, dysphagia, and dysphonia  In addition, the patient is requesting transfer to higher level of care to be seen by Gastroenterology on a daily basis  The patient will be transferred to [address] [place] [address] on the service of Dr Dominic Conner [facility] Attending Physician)         Discharging Physician / Practitioner: Guillermo Arellano DO  PCP: Lyudmila Weaver PA-C  Admission Date:   Admission Orders (From admission, onward)     Ordered        08/26/19 1854  Inpatient Admission (expected length of stay for this patient Order details is greater than two midnights)  Once         08/26/19 1802  Inpatient Admission (expected length of stay for this patient Order details is greater than two midnights)  Once,   Status:  Canceled                   Discharge Date/Transfer Date: 09/12/19      Consultations During Hospital Stay:  · Infectious Disease  · Gastroenterology    Procedures Performed:   · EGD and colonoscopy on 08/30/2019    Significant Findings / Test Results:   Us Extremity Soft Tissue    Result Date: 8/30/2019  Impression: No fluid collections or suspicious masses detected  Left axillary lymph nodes, 1 of which is mildly prominent in size, however, all visualized lymph nodes demonstrate normal intrinsic morphology  This is of doubtful clinical significance  Follow-up if any may be based on clinical grounds  Workstation performed: [id number]     XR chest portable   Status: Final result   PACS Images      Show images for XR chest portable   Study Result     CHEST      INDICATION:  Fever      COMPARISON:  9/9/2017, CTA chest, abdomen and pelvis 2/12/2019     EXAM PERFORMED/VIEWS:  XR CHEST PORTABLE  Images: 2     FINDINGS:  Monitoring leads and clips project over the chest      Cardiomediastinal silhouette appears unremarkable      The lungs are clear  No pneumothorax or pleural effusion      Osseous structures appear within normal limits for patient age  Surgical clips right upper quadrant      IMPRESSION:     No acute cardiopulmonary disease  CT abdomen pelvis wo contrast   Status: Final result   PACS Images      Show images for CT abdomen pelvis wo contrast   Study Result     CT ABDOMEN AND PELVIS WITHOUT IV CONTRAST     INDICATION:   Abdominal pain, GI bleed, elevated lactate  Joey Garcia "Pt states she hasn't been feeling good x3 weeks with blood in stool  States "everything hurts," states hx of diverticulitis  Started this am with fever"     COMPARISON:  None      TECHNIQUE:  CT examination of the abdomen and pelvis was performed without intravenous contrast   Axial, sagittal, and coronal 2D reformatted images were created from the source data and submitted for interpretation       Radiation dose length product (DLP) for this visit:  454 99 mGy-cm     This examination, like all CT scans performed in the [facility], was performed utilizing techniques to minimize radiation dose exposure, including the use of iterative   reconstruction and automated exposure control       Enteric contrast was administered       FINDINGS:     ABDOMEN     LOWER CHEST:  No clinically significant abnormality identified in the visualized lower chest      LIVER/BILIARY TREE:  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      GALLBLADDER:  Gallbladder is surgically absent      SPLEEN:  Unremarkable      PANCREAS:  Unremarkable      ADRENAL GLANDS:  Unremarkable      KIDNEYS/URETERS:  Nonobstructive 4 mm left renal lower pole calculus  Nonobstructive 1 mm upper pole right renal calculus      STOMACH AND BOWEL:  Circumferential thickening of the rectum in keeping with a nonspecific proctitis  No bowel pneumatosis  No bowel obstruction      APPENDIX:  No findings to suggest appendicitis      ABDOMINOPELVIC CAVITY:  No ascites or free intraperitoneal air  No lymphadenopathy      VESSELS:  Unremarkable for patient's age      PELVIS     REPRODUCTIVE ORGANS:  Unremarkable for patient's age      URINARY BLADDER:  Unremarkable      ABDOMINAL WALL/INGUINAL REGIONS:  Unremarkable      OSSEOUS STRUCTURES:  No acute fracture or destructive osseous lesion      IMPRESSION:     Circumferential thickening of the rectum in keeping with a nonspecific proctitis  No bowel pneumatosis  No bowel obstruction      The study was marked in EPIC for immediate notification         ECG 12 lead   Order: [id number]   Status:  Final result   Visible to patient:  Yes (MyChart)   Next appt:  09/17/2019 at 11:00 AM in Sleep Medicine Brenda Gomez MD)   (important suggestion)  Newer results are available  Click to view them now      Ref Range & Units 8/26/19 1534   Ventricular Rate     Atrial Rate     OK Interval ms    QRSD Interval ms 64    QT Interval ms 364    QTC Interval ms 551    P Axis degrees    QRS Axis degrees 86    T Wave Axis degrees 62       Narrative     Sinus tachycardia  ST & T wave abnormality, consider inferior ischemia  Abnormal ECG  When compared with ECG of 26-AUG-2019 15:34,  T wave inversion no longer evident in Inferior leads  T wave inversion less evident in Anterolateral leads  Confirmed by Janeth Fountain ([id number]) on 8/27/2019 8:29:23 AM      Specimen Collected: 08/26/19 15:34   Last Resulted: 08/27/19 08:29           ECG 12 lead   Order: [id number]   Status:  Final result   Visible to patient:  Yes (MyChart) Next appt:  09/17/2019 at 11:00 AM in Sleep Medicine Barb Anderson MD)    Ref Range & Units 9/10/19 1507 8/26/19 1534 2/12/19 0640   Ventricular Rate   138  96    Atrial Rate   136  96    AL Interval ms 126   110    QRSD Interval ms 86  64  76    QT Interval ms 350  364  344    QTC Interval ms 456  551  434    P Axis degrees 79   45    QRS Axis degrees 75  86  76    T Wave Axis degrees 226  62  65       Narrative     Sinus tachycardia  ST & T wave abnormality, consider inferior ischemia  ST & T wave abnormality, consider anterolateral ischemia  Abnormal ECG  When compared with ECG of 26-AUG-2019 15:34,  T wave inversion now evident in Inferior leads  T wave inversion more evident in Anterolateral leads  Confirmed by Kate Dias ([id number]) on 9/11/2019 9:34:40 AM           Microbiology Results (last 21 days)     Procedure Component Value - Date/Time   Blood culture [[id number]] Collected: 09/11/19 0424   Lab Status: Preliminary result Specimen: Blood from Arm, Left Updated: 09/12/19 1301    Blood Culture No Growth at 24 hrs  Blood culture [[id number]] Collected: 09/11/19 0423   Lab Status: Preliminary result Specimen: Blood from Hand, Right Updated: 09/12/19 1301    Blood Culture No Growth at 24 hrs  Pancreatic elastase, fecal [[id number]] Collected: 09/10/19 1922   Lab Status:  In process Specimen: Stool from Rectum Updated: 09/10/19 1929   Blood culture [[id number]] Collected: 08/29/19 7854   Lab Status: Final result Specimen: Blood from Arm, Right Updated: 09/03/19 1201    Blood Culture No Growth After 5 Days  Blood culture [[id number]] Collected: 08/29/19 0615   Lab Status: Final result Specimen: Blood from Hand, Left Updated: 09/03/19 1201    Blood Culture No Growth After 5 Days  Ova and parasite examination [[id number]] Collected: 08/28/19 1847   Lab Status: Final result Specimen: Stool from Rectum Updated: 09/05/19 2005    Ova + Parasite Exam No ova, cysts, or parasites seen                                                                   One negative specimen does not rule out the possibility of a   parasitic infection  Comment: These results were obtained using wet preparation(s) and trichrome   stained smear  This test does not include testing for Cryptosporidium   parvum, Cyclospora, or Microsporidia  Narrative:     Performed at: [address] [place] [address]  [id number]  : Jennifer Allred MD, Phone:  [phone]   Stool Enteric Bacterial Panel by PCR [[id number]] (Normal) Collected: 08/28/19 1515   Lab Status: Final result Specimen: Stool from Per Rectum Updated: 08/29/19 0349    Salmonella sp PCR None Detected    Shigella sp/Enteroinvasive E  coli (EIEC) PCR None Detected    Campylobacter sp (jejuni and coli) PCR None Detected    Shiga toxin 1/Shiga toxin 2 genes PCR None Detected   Fecal leukocytes [[id number]] Collected: 08/28/19 1514   Lab Status: Final result Specimen: Stool from Per Rectum Updated: 09/01/19 1005    White Blood Cells, Stool No white blood cells seen     Narrative:     Performed at: [address] [place] [address] [address]  [id number]  : Jennifer Allred MD, Phone:  [phone]   Rotavirus antigen, stool [[id number]] (Normal) Collected: 08/28/19 1513   Lab Status: Final result Specimen: Stool from Rectum Updated: 08/28/19 2256    Rotavirus Negative   Calprotectin,Fecal [[id number]] Collected: 08/28/19 1513   Lab Status: Final result Specimen: Stool from Rectum Updated: 08/31/19 0606    Calprotectin 36 ug/g     Comment: Concentration     Interpretation   Follow-Up   <16 - 50 ug/g     Normal           None   >50 -120 ug/g     Borderline       Re-evaluate in 4-6 weeks       >120 ug/g     Abnormal         Repeat as clinically                                      indicated      Narrative:     Performed at: [address]  [id number]  : Bernie Mullins MD, Phone:  [phone]   Clostridium difficile toxin by PCR [[id number]] (Normal) Collected: 08/27/19 1848   Lab Status: Final result Specimen: Stool from Per Rectum Updated: 08/28/19 0353    C difficile toxin by PCR NEGATIVE for C difficle toxin by PCR  Urine culture [[id number]] Collected: 08/27/19 1644   Lab Status: Final result Specimen: Urine, Clean Catch Updated: 08/28/19 1815    Urine Culture No Growth <1000 cfu/mL   Occult blood 1-3, stool [[id number]] (Abnormal) Collected: 08/26/19 2336   Lab Status: Final result Specimen: Stool from Rectum Updated: 08/27/19 0450    Fecal Occult Blood Diagnostic PositiveAbnormal     Fecal Occult Blood Diagnostic 2 PositiveAbnormal     Fecal Occult Blood Diagnostic 3 PositiveAbnormal     Comment: This is an appended report  Nancy Dunn results have been appended to a previously preliminary verified report        Blood culture #1 [[id number]] (Abnormal)  Collected: 08/26/19 1608   Lab Status: Final result Specimen: Blood from Hand, Right Updated: 08/29/19 0839    Blood Culture Citrobacter freundiiAbnormal     Gram Stain Result Gram negative rodsAbnormal    Susceptibility     Citrobacter freundii (1)     Antibiotic Interpretation Microscan Method Status   ZID Performed  Yes  VARUN Final   Amoxicillin + Clavulanate Resistant <=4/2 ug/ml VARUN Final   Ampicillin ($$) Resistant <=8 00 ug/ml VARUN Final   Ampicillin + Sulbactam ($) Resistant 2/1 ug/ml VARUN Final   Aztreonam ($$$)  Susceptible <=4 ug/ml VARUN Final   Cefazolin ($) Resistant <=2 00 ug/ml VARUN Final   Cefotaxime ($) Susceptible <=2 00 ug/ml VARUN Final Ceftazidime ($$) Susceptible <=1 ug/ml VARUN Final   Ceftriaxone ($$) Susceptible <=1 00 ug/ml VARUN Final   Cefuroxime ($$) Resistant <=4 ug/ml VARUN Final   Ciprofloxacin ($)  Susceptible <=1 00 ug/ml VARUN Final   Ertapenem ($$$) Susceptible <=0 5 ug/ml VARUN Final   Gentamicin ($$) Susceptible <=1 ug/ml VARUN Final   Levofloxacin ($) Susceptible <=0 25 ug/ml VARUN Final   Piperacillin + Tazobactam ($$$) Susceptible <=4 ug/ml VARUN Final   Tetracycline Susceptible <=4 ug/ml VARUN Final   Tobramycin ($) Susceptible <=1 ug/ml VARUN Final   Trimethoprim + Sulfamethoxazole ($$$) Susceptible <=2/38 ug/ml VARUN Final         Blood culture #2 [[id number]] Collected: 08/26/19 1546   Lab Status: Final result Specimen: Blood from Arm, Left Updated: 08/31/19 2201    Blood Culture No Growth After 5 Days  Incidental Findings:   · None     Test Results Pending at Discharge (will require follow up): · None    Complications:  None    Reason for Admission:  Severe sepsis    Hospital Course:     Marcelo Jason is a 53 y o  female patient who originally presented to the hospital on 8/26/2019 due to intractable abdominal pain, fevers, and chills  Please see above list of diagnoses and related plan for additional information  Condition at Discharge: stable     Discharge Day Visit / Exam:     Subjective: The patient was seen and examined  The patient is complaining of severe odynophagia and dysphagia  She is also complaining of dysphonia      Vitals: Blood Pressure: 100/65 (09/12/19 0743)  Pulse: 58 (09/12/19 0743)  Temperature: 98 °F (36 7 °C) (09/12/19 0743)  Temp Source: Oral (09/11/19 1915)  Respirations: 16 (09/12/19 0743)  Height: 5' 5" (165 1 cm) (08/27/19 1556)  Weight - Scale: 59 4 kg (131 lb) (09/12/19 0539)  SpO2: 97 % (09/12/19 0743)  Exam:   Physical Exam  General:  NAD, awake, alert, follows commands  HEENT:  NC/AT, mucous membranes dry  Neck:  Supple, No JVP elevation  CV:  + S1, + S2, Bradycardic, Regular rhythm  Pulm:  Lung fields are CTA bilaterally  Abd:  Soft, Non-tender, Non-distended  Ext:  No clubbing/cyanosis/edema  Skin:  No rashes    Discharge instructions/Information to patient and family:   See after visit summary for information provided to patient and family  Provisions for Follow-Up Care:  See after visit summary for information related to follow-up care and any pertinent home health orders  Disposition:  An ENT evaluation was recommended for the patient, and the patient demands an inpatient ENT evaluation for her odynophagia, dysphagia, and dysphonia  In addition, the patient is requesting transfer to higher level of care to be seen by Gastroenterology on a daily basis  The patient will be transferred to [address] [place] [address] on the service of Dr Jenny Rodriguez [facility] Attending Physician)  Planned Readmission:  No     Discharge Statement:  I spent 45 minutes discharging the patient  This time was spent on the day of discharge  I had direct contact with the patient on the day of discharge  Greater than 50% of the total time was spent examining patient, answering all patient questions, arranging and discussing plan of care with patient as well as directly providing post-discharge instructions  Additional time then spent on discharge activities  Discharge Medications:  See after visit summary for reconciled discharge medications provided to patient and family        ** Please Note: This note has been constructed using a voice recognition system **

## 2019-09-12 NOTE — ASSESSMENT & PLAN NOTE
Malnutrition Findings:   Malnutrition type: Acute illness  Degree of Malnutrition: Malnutrition of mild degree    BMI Findings: Body mass index is 21 8 kg/m²

## 2019-09-12 NOTE — PLAN OF CARE
Problem: PAIN - ADULT  Goal: Verbalizes/displays adequate comfort level or baseline comfort level  Description  Interventions:  - Encourage patient to monitor pain and request assistance  - Assess pain using appropriate pain scale  - Administer analgesics based on type and severity of pain and evaluate response  - Implement non-pharmacological measures as appropriate and evaluate response  - Consider cultural and social influences on pain and pain management  - Notify physician/advanced practitioner if interventions unsuccessful or patient reports new pain  Outcome: Progressing     Problem: INFECTION - ADULT  Goal: Absence or prevention of progression during hospitalization  Description  INTERVENTIONS:  - Assess and monitor for signs and symptoms of infection  - Monitor lab/diagnostic results  - Monitor all insertion sites, i e  indwelling lines, tubes, and drains  - Monitor endotracheal if appropriate and nasal secretions for changes in amount and color  - [place] appropriate cooling/warming therapies per order  - Administer medications as ordered  - Instruct and encourage patient and family to use good hand hygiene technique  - Identify and instruct in appropriate isolation precautions for identified infection/condition  Outcome: Progressing  Goal: Absence of fever/infection during neutropenic period  Description  INTERVENTIONS:  - Monitor WBC    Outcome: Progressing     Problem: SAFETY ADULT  Goal: Patient will remain free of falls  Description  INTERVENTIONS:  - Assess patient frequently for physical needs  -  Identify cognitive and physical deficits and behaviors that affect risk of falls    -  [place] fall precautions as indicated by assessment   - Educate patient/family on patient safety including physical limitations  - Instruct patient to call for assistance with activity based on assessment  - Modify environment to reduce risk of injury  - Consider OT/PT consult to assist with strengthening/mobility  Outcome: Progressing  Goal: Maintain or return to baseline ADL function  Description  INTERVENTIONS:  -  Assess patient's ability to carry out ADLs; assess patient's baseline for ADL function and identify physical deficits which impact ability to perform ADLs (bathing, care of mouth/teeth, toileting, grooming, dressing, etc )  - Assess/evaluate cause of self-care deficits   - Assess range of motion  - Assess patient's mobility; develop plan if impaired  - Assess patient's need for assistive devices and provide as appropriate  - Encourage maximum independence but intervene and supervise when necessary  - Involve family in performance of ADLs  - Assess for home care needs following discharge   - Consider OT consult to assist with ADL evaluation and planning for discharge  - Provide patient education as appropriate  Outcome: Progressing  Goal: Maintain or return mobility status to optimal level  Description  INTERVENTIONS:  - Assess patient's baseline mobility status (ambulation, transfers, stairs, etc )    - Identify cognitive and physical deficits and behaviors that affect mobility  - Identify mobility aids required to assist with transfers and/or ambulation (gait belt, sit-to-stand, lift, walker, cane, etc )  - [place] fall precautions as indicated by assessment  - Record patient progress and toleration of activity level on Mobility SBAR; progress patient to next Phase/Stage  - Instruct patient to call for assistance with activity based on assessment  - Consider rehabilitation consult to assist with strengthening/weightbearing, etc   Outcome: Progressing     Problem: DISCHARGE PLANNING  Goal: Discharge to home or other facility with appropriate resources  Description  INTERVENTIONS:  - Identify barriers to discharge w/patient and caregiver  - Arrange for needed discharge resources and transportation as appropriate  - Identify discharge learning needs (meds, wound care, etc )  - Arrange for interpretive services to assist at discharge as needed  - Refer to Case Management Department for coordinating discharge planning if the patient needs post-hospital services based on physician/advanced practitioner order or complex needs related to functional status, cognitive ability, or social support system  Outcome: Progressing     Problem: COPING  Goal: Pt/Family able to verbalize concerns and demonstrate effective coping strategies  Description  INTERVENTIONS:  - Assist patient/family to identify coping skills, available support systems and cultural and spiritual values  - Provide emotional support, including active listening and acknowledgement of concerns of patient and caregivers  - Reduce environmental stimuli, as able  - Provide patient education  - Assess for spiritual pain/suffering and initiate spiritual care, including notification of Pastoral Care or alvin based community as needed  - Assess effectiveness of coping strategies  Outcome: Progressing  Goal: Will report anxiety at manageable levels  Description  INTERVENTIONS:  - Administer medication as ordered  - Teach and encourage coping skills  - Provide emotional support  - Assess patient/family for anxiety and ability to cope  Outcome: Progressing

## 2019-09-12 NOTE — ASSESSMENT & PLAN NOTE
· The patient is seen regularly at [facility]  · Continue her medication regimen per [facility]

## 2019-09-12 NOTE — SOCIAL WORK
Pt is being transferred to B to a higher level of care because pt requires and ENT eval and daily GI and daily ID  I called [facility] to notify them of transfer to higher level of care and start amb auth

## 2019-09-12 NOTE — ASSESSMENT & PLAN NOTE
The patient was seen in consultation by GI for exacerbation of her ulcerative colitis  She is still on Solu-Medrol 40 mg IV Q 8 hrs  Can transition to PO prednisone when patient more stable  Continue IV fluids with normal saline  Gastroenterology consult placed  She recently underwent a colonoscopy by GI on 08/30/2019 which showed moderately severe proctosigmoiditis with 2 linear ulcers noted in the mid rectum  Biopsies were taken to rule out CMV  Random biopsies were taken from ascending, transverse, descending, and sigmoid colon  Due to high risk of DVT in the setting of ulcerative colitis, will continue patient on DVT prophylaxis with Lovenox 40 mg subcu once daily even in the setting of rectal bleeding

## 2019-09-12 NOTE — ASSESSMENT & PLAN NOTE
Patient was at [facility] with severe sepsis that was present on admission there, due to gram-negative bacteremia  She was seen in consultation by Infectious Disease and was initially treated with IV cefepime  She was then transitioned to PO Levaquin 750 mg daily on 08/31 and she completed a 14 day course of antibiotic therapy  Currently severe sepsis is resolved with resolution of lactic acidosis  The patient is also requesting Infectious Disease consultation but unsure what diagnosis to tell Infectious Diseases in order to complete consult

## 2019-09-12 NOTE — ASSESSMENT & PLAN NOTE
Patient has had progressively worsening dysphagia secondary to a tonsillectomy performed in April 2019  She is having difficulty with oral food and fluid intake  She was offered an ENT evaluation as an outpatient but is requesting inpatient ENT evaluation  For this reason, she was transferred to [facility] because [facility] does not have access to ENT consult services  ENT consult has been placed  The patient will also need a video barium swallow test performed

## 2019-09-12 NOTE — ASSESSMENT & PLAN NOTE
· Severe sepsis was present on admission and secondary to gram-negative bacteremia  · The patient was initially treated with IV cefepime  · The patient was seen in consultation by Infectious Disease  · The lactic acidosis has resolved  · The patient was transitioned to levofloxacin 750 mg PO every 24 hours on 08/31/2019  · The patient completed a 14-day antibiotic course during the hospitalization per Infectious Disease's recommendations    · Progressively worsening since a tonsillectomy in May of 2019  · The patient is having difficulty with PO food and fluid intake  · The patient needs to be evaluated by ENT  · The patient was offered an ENT evaluation as an outpatient, but the patient is demanding an inpatient ENT evaluation  · She will also need a barium video swallow test completed    The patient is requesting to be evaluated by Infectious Disease on a daily basis, which is not available at [address] [place] [address]  The patient will be transferred to [address] [place] [address] on the service of Dr Akil Dale [facility] Attending Physician)

## 2019-09-12 NOTE — ASSESSMENT & PLAN NOTE
Malnutrition Findings:           BMI Findings: There is no height or weight on file to calculate BMI  Can consider nutrition consult

## 2019-09-12 NOTE — SOCIAL WORK
I spoke to representative from [facility]  Patient's transport to [facility] does not require authorization  I notified [facility] of transfer to [facility]   The pending reference for admission to [facility]  is [id number]  Fax clinicals to [phone]  Clinicals should be faxed within 24 hours of arrival to [facility]

## 2019-09-12 NOTE — ASSESSMENT & PLAN NOTE
· Progressively worsening since a tonsillectomy in May of 2019  · The patient is having difficulty with PO food and fluid intake  · The patient needs to be evaluated by ENT  · The patient was offered an ENT evaluation as an outpatient, but the patient is demanding an inpatient ENT evaluation  · She will also need a barium video swallow test completed    The patient is requesting an inpatient ENT evaluation, which is not available at [address] [place] [address]  The patient will be transferred to [address] [place] [address] on the service of Dr Philippe Ruiz [facility] Attending Physician)

## 2019-09-13 ENCOUNTER — APPOINTMENT (INPATIENT)
Dept: RADIOLOGY | Facility: HOSPITAL | Age: 44
DRG: 392 | End: 2019-09-13
Payer: COMMERCIAL

## 2019-09-13 LAB
ALBUMIN SERPL BCP-MCNC: 3 G/DL (ref 3.5–5)
ALP SERPL-CCNC: 47 U/L (ref 46–116)
ALT SERPL W P-5'-P-CCNC: 90 U/L (ref 12–78)
ANION GAP SERPL CALCULATED.3IONS-SCNC: 7 MMOL/L (ref 4–13)
AST SERPL W P-5'-P-CCNC: 32 U/L (ref 5–45)
BASOPHILS # BLD AUTO: 0.01 THOUSANDS/ΜL (ref 0–0.1)
BASOPHILS NFR BLD AUTO: 0 % (ref 0–1)
BILIRUB SERPL-MCNC: 0.29 MG/DL (ref 0.2–1)
BUN SERPL-MCNC: 9 MG/DL (ref 5–25)
CALCIUM SERPL-MCNC: 8.5 MG/DL (ref 8.3–10.1)
CHLORIDE SERPL-SCNC: 101 MMOL/L (ref 100–108)
CO2 SERPL-SCNC: 29 MMOL/L (ref 21–32)
CREAT SERPL-MCNC: 0.69 MG/DL (ref 0.6–1.3)
EOSINOPHIL # BLD AUTO: 0.01 THOUSAND/ΜL (ref 0–0.61)
EOSINOPHIL NFR BLD AUTO: 0 % (ref 0–6)
ERYTHROCYTE [DISTWIDTH] IN BLOOD BY AUTOMATED COUNT: 13.2 % (ref 11.6–15.1)
GFR SERPL CREATININE-BSD FRML MDRD: 105 ML/MIN/1.73SQ M
GLUCOSE SERPL-MCNC: 142 MG/DL (ref 65–140)
HCT VFR BLD AUTO: 42.9 % (ref 34.8–46.1)
HGB BLD-MCNC: 14.1 G/DL (ref 11.5–15.4)
IMM GRANULOCYTES # BLD AUTO: 0.12 THOUSAND/UL (ref 0–0.2)
IMM GRANULOCYTES NFR BLD AUTO: 1 % (ref 0–2)
LYMPHOCYTES # BLD AUTO: 0.86 THOUSANDS/ΜL (ref 0.6–4.47)
LYMPHOCYTES NFR BLD AUTO: 7 % (ref 14–44)
MAGNESIUM SERPL-MCNC: 2.2 MG/DL (ref 1.6–2.6)
MCH RBC QN AUTO: 31.9 PG (ref 26.8–34.3)
MCHC RBC AUTO-ENTMCNC: 32.9 G/DL (ref 31.4–37.4)
MCV RBC AUTO: 97 FL (ref 82–98)
MONOCYTES # BLD AUTO: 0.36 THOUSAND/ΜL (ref 0.17–1.22)
MONOCYTES NFR BLD AUTO: 3 % (ref 4–12)
NEUTROPHILS # BLD AUTO: 10.9 THOUSANDS/ΜL (ref 1.85–7.62)
NEUTS SEG NFR BLD AUTO: 89 % (ref 43–75)
NRBC BLD AUTO-RTO: 0 /100 WBCS
PHOSPHATE SERPL-MCNC: 3.9 MG/DL (ref 2.7–4.5)
PLATELET # BLD AUTO: 376 THOUSANDS/UL (ref 149–390)
PMV BLD AUTO: 8.8 FL (ref 8.9–12.7)
POTASSIUM SERPL-SCNC: 4.4 MMOL/L (ref 3.5–5.3)
PROT SERPL-MCNC: 6.4 G/DL (ref 6.4–8.2)
RBC # BLD AUTO: 4.42 MILLION/UL (ref 3.81–5.12)
SODIUM SERPL-SCNC: 137 MMOL/L (ref 136–145)
WBC # BLD AUTO: 12.26 THOUSAND/UL (ref 4.31–10.16)

## 2019-09-13 PROCEDURE — 99233 SBSQ HOSP IP/OBS HIGH 50: CPT | Performed by: INTERNAL MEDICINE

## 2019-09-13 PROCEDURE — G8989 SELF CARE D/C STATUS: HCPCS

## 2019-09-13 PROCEDURE — 97163 PT EVAL HIGH COMPLEX 45 MIN: CPT

## 2019-09-13 PROCEDURE — 80053 COMPREHEN METABOLIC PANEL: CPT | Performed by: FAMILY MEDICINE

## 2019-09-13 PROCEDURE — G8978 MOBILITY CURRENT STATUS: HCPCS

## 2019-09-13 PROCEDURE — 86235 NUCLEAR ANTIGEN ANTIBODY: CPT | Performed by: INTERNAL MEDICINE

## 2019-09-13 PROCEDURE — 92611 MOTION FLUOROSCOPY/SWALLOW: CPT

## 2019-09-13 PROCEDURE — G8996 SWALLOW CURRENT STATUS: HCPCS

## 2019-09-13 PROCEDURE — 97530 THERAPEUTIC ACTIVITIES: CPT

## 2019-09-13 PROCEDURE — G8998 SWALLOW D/C STATUS: HCPCS

## 2019-09-13 PROCEDURE — 85025 COMPLETE CBC W/AUTO DIFF WBC: CPT | Performed by: FAMILY MEDICINE

## 2019-09-13 PROCEDURE — G8987 SELF CARE CURRENT STATUS: HCPCS

## 2019-09-13 PROCEDURE — G8979 MOBILITY GOAL STATUS: HCPCS

## 2019-09-13 PROCEDURE — 97167 OT EVAL HIGH COMPLEX 60 MIN: CPT

## 2019-09-13 PROCEDURE — 83735 ASSAY OF MAGNESIUM: CPT | Performed by: FAMILY MEDICINE

## 2019-09-13 PROCEDURE — G8988 SELF CARE GOAL STATUS: HCPCS

## 2019-09-13 PROCEDURE — 74230 X-RAY XM SWLNG FUNCJ C+: CPT

## 2019-09-13 PROCEDURE — 84100 ASSAY OF PHOSPHORUS: CPT | Performed by: FAMILY MEDICINE

## 2019-09-13 PROCEDURE — G8980 MOBILITY D/C STATUS: HCPCS

## 2019-09-13 PROCEDURE — G8997 SWALLOW GOAL STATUS: HCPCS

## 2019-09-13 RX ORDER — METHYLPREDNISOLONE SODIUM SUCCINATE 40 MG/ML
20 INJECTION, POWDER, LYOPHILIZED, FOR SOLUTION INTRAMUSCULAR; INTRAVENOUS EVERY 8 HOURS SCHEDULED
Status: DISCONTINUED | OUTPATIENT
Start: 2019-09-13 | End: 2019-09-15

## 2019-09-13 RX ADMIN — HYDROMORPHONE HYDROCHLORIDE 0.5 MG: 1 INJECTION, SOLUTION INTRAMUSCULAR; INTRAVENOUS; SUBCUTANEOUS at 10:16

## 2019-09-13 RX ADMIN — METHYLNALTREXONE BROMIDE 8 MG: 8 INJECTION, SOLUTION SUBCUTANEOUS at 18:21

## 2019-09-13 RX ADMIN — HYDROMORPHONE HYDROCHLORIDE 0.5 MG: 1 INJECTION, SOLUTION INTRAMUSCULAR; INTRAVENOUS; SUBCUTANEOUS at 01:40

## 2019-09-13 RX ADMIN — Medication 5 SPRAY: at 16:54

## 2019-09-13 RX ADMIN — CETIRIZINE HYDROCHLORIDE 20 MG: 10 TABLET, FILM COATED ORAL at 13:20

## 2019-09-13 RX ADMIN — NYSTATIN 500000 UNITS: 100000 SUSPENSION ORAL at 18:28

## 2019-09-13 RX ADMIN — LIDOCAINE 2 PATCH: 50 PATCH TOPICAL at 08:59

## 2019-09-13 RX ADMIN — DIPHENHYDRAMINE HYDROCHLORIDE 50 MG: 50 INJECTION, SOLUTION INTRAMUSCULAR; INTRAVENOUS at 17:54

## 2019-09-13 RX ADMIN — SODIUM CHLORIDE 75 ML/HR: 0.9 INJECTION, SOLUTION INTRAVENOUS at 22:15

## 2019-09-13 RX ADMIN — NYSTATIN 500000 UNITS: 100000 SUSPENSION ORAL at 22:04

## 2019-09-13 RX ADMIN — Medication 5 SPRAY: at 09:07

## 2019-09-13 RX ADMIN — DIPHENHYDRAMINE HYDROCHLORIDE 50 MG: 50 INJECTION, SOLUTION INTRAMUSCULAR; INTRAVENOUS at 04:48

## 2019-09-13 RX ADMIN — HYDROMORPHONE HYDROCHLORIDE 0.5 MG: 1 INJECTION, SOLUTION INTRAMUSCULAR; INTRAVENOUS; SUBCUTANEOUS at 20:03

## 2019-09-13 RX ADMIN — Medication 250 MG: at 09:00

## 2019-09-13 RX ADMIN — METOCLOPRAMIDE 5 MG: 5 INJECTION, SOLUTION INTRAMUSCULAR; INTRAVENOUS at 16:49

## 2019-09-13 RX ADMIN — SODIUM CHLORIDE 75 ML/HR: 0.9 INJECTION, SOLUTION INTRAVENOUS at 11:54

## 2019-09-13 RX ADMIN — RANITIDINE 300 MG: 150 TABLET ORAL at 16:49

## 2019-09-13 RX ADMIN — DIPHENHYDRAMINE HYDROCHLORIDE 50 MG: 50 INJECTION, SOLUTION INTRAMUSCULAR; INTRAVENOUS at 00:43

## 2019-09-13 RX ADMIN — PANTOPRAZOLE SODIUM 40 MG: 40 TABLET, DELAYED RELEASE ORAL at 11:57

## 2019-09-13 RX ADMIN — NYSTATIN 500000 UNITS: 100000 SUSPENSION ORAL at 08:59

## 2019-09-13 RX ADMIN — LEVOTHYROXINE SODIUM 50 MCG: 50 TABLET ORAL at 06:06

## 2019-09-13 RX ADMIN — CETIRIZINE HYDROCHLORIDE 20 MG: 10 TABLET, FILM COATED ORAL at 17:54

## 2019-09-13 RX ADMIN — LORAZEPAM 1 MG: 1 TABLET ORAL at 17:53

## 2019-09-13 RX ADMIN — NYSTATIN 500000 UNITS: 100000 SUSPENSION ORAL at 11:57

## 2019-09-13 RX ADMIN — DIPHENHYDRAMINE HYDROCHLORIDE 50 MG: 50 INJECTION, SOLUTION INTRAMUSCULAR; INTRAVENOUS at 22:04

## 2019-09-13 RX ADMIN — DIPHENHYDRAMINE HYDROCHLORIDE 50 MG: 50 INJECTION, SOLUTION INTRAMUSCULAR; INTRAVENOUS at 13:20

## 2019-09-13 RX ADMIN — METOCLOPRAMIDE 5 MG: 5 INJECTION, SOLUTION INTRAMUSCULAR; INTRAVENOUS at 06:06

## 2019-09-13 RX ADMIN — METOCLOPRAMIDE 5 MG: 5 INJECTION, SOLUTION INTRAMUSCULAR; INTRAVENOUS at 12:00

## 2019-09-13 RX ADMIN — MONTELUKAST SODIUM 10 MG: 10 TABLET, COATED ORAL at 22:04

## 2019-09-13 RX ADMIN — LORAZEPAM 1 MG: 1 TABLET ORAL at 08:59

## 2019-09-13 RX ADMIN — RANITIDINE 300 MG: 150 TABLET ORAL at 12:03

## 2019-09-13 RX ADMIN — TEMAZEPAM 30 MG: 15 CAPSULE ORAL at 22:04

## 2019-09-13 RX ADMIN — METHYLPREDNISOLONE SODIUM SUCCINATE 40 MG: 40 INJECTION, POWDER, FOR SOLUTION INTRAMUSCULAR; INTRAVENOUS at 06:06

## 2019-09-13 RX ADMIN — Medication 250 MG: at 17:54

## 2019-09-13 RX ADMIN — METHYLPREDNISOLONE SODIUM SUCCINATE 20 MG: 40 INJECTION, POWDER, FOR SOLUTION INTRAMUSCULAR; INTRAVENOUS at 22:04

## 2019-09-13 RX ADMIN — Medication 5 SPRAY: at 22:07

## 2019-09-13 RX ADMIN — ENOXAPARIN SODIUM 40 MG: 40 INJECTION SUBCUTANEOUS at 09:46

## 2019-09-13 RX ADMIN — PANTOPRAZOLE SODIUM 40 MG: 40 TABLET, DELAYED RELEASE ORAL at 16:49

## 2019-09-13 RX ADMIN — DIPHENHYDRAMINE HYDROCHLORIDE 50 MG: 50 INJECTION, SOLUTION INTRAMUSCULAR; INTRAVENOUS at 08:59

## 2019-09-13 RX ADMIN — METHYLPREDNISOLONE SODIUM SUCCINATE 40 MG: 40 INJECTION, POWDER, FOR SOLUTION INTRAMUSCULAR; INTRAVENOUS at 15:00

## 2019-09-13 RX ADMIN — MONTELUKAST SODIUM 10 MG: 10 TABLET, COATED ORAL at 10:00

## 2019-09-13 RX ADMIN — HYDROMORPHONE HYDROCHLORIDE 0.5 MG: 1 INJECTION, SOLUTION INTRAMUSCULAR; INTRAVENOUS; SUBCUTANEOUS at 06:05

## 2019-09-13 RX ADMIN — HYDROMORPHONE HYDROCHLORIDE 0.5 MG: 1 INJECTION, SOLUTION INTRAMUSCULAR; INTRAVENOUS; SUBCUTANEOUS at 15:11

## 2019-09-13 RX ADMIN — METOCLOPRAMIDE 5 MG: 5 INJECTION, SOLUTION INTRAMUSCULAR; INTRAVENOUS at 22:04

## 2019-09-13 NOTE — ASSESSMENT & PLAN NOTE
The patient is seen regularly and follows up at [facility]  Continue the patient's scheduled medications from [facility]

## 2019-09-13 NOTE — H&P
H&P- Clement Late 1975, 53 y o  female MRN: [id number]    Unit/Bed#: E2 -01 Encounter: [id number]    Primary Care Provider: Celestino Ching PA-C   Date and time admitted to hospital: 9/12/2019  5:40 PM        * Severe sepsis [facility])  Assessment & Plan  Patient was at [facility] with severe sepsis that was present on admission there, due to gram-negative bacteremia  She was seen in consultation by Infectious Disease and was initially treated with IV cefepime  She was then transitioned to PO Levaquin 750 mg daily on 08/31 and she completed a 14 day course of antibiotic therapy  Currently severe sepsis is resolved with resolution of lactic acidosis  The patient is also requesting Infectious Disease consultation but unsure what diagnosis to tell Infectious Diseases in order to complete consult  Dysphagia  Assessment & Plan  Patient has had progressively worsening dysphagia secondary to a tonsillectomy performed in April 2019  She is having difficulty with oral food and fluid intake  She was offered an ENT evaluation as an outpatient but is requesting inpatient ENT evaluation  For this reason, she was transferred to [facility] because [address] [place] [address] does not have access to ENT consult services  ENT consult has been placed  The patient will also need a video barium swallow test performed  Odynophagia  Assessment & Plan  Patient has had progressively worsening odynophagia secondary to a tonsillectomy performed in April 2019  She is having difficulty with oral food and fluid intake  She was offered an ENT evaluation as an outpatient but is requesting inpatient ENT evaluation  For this reason, she was transferred to [facility] because [address] [place] [address] does not have access to ENT consult services  ENT consult has been placed  The patient will also need a video barium swallow test performed        Bacteremia due to Gram-negative bacteria  Assessment & Plan  Patient initially presented to [address] [place] [address] with severe sepsis secondary to gram-negative bacteremia  She was initially treated with IV cefepime and was seen in consultation by Infectious Diseases  She was eventually transitioned to PO Levaquin 750 mg daily on 08/31 and she completed a 14 day course of antibiotic therapy  Exacerbation of ulcerative colitis with rectal bleeding [facility])  Assessment & Plan  The patient was seen in consultation by GI for exacerbation of her ulcerative colitis  She is still on Solu-Medrol 40 mg IV Q 8 hrs  Can transition to PO prednisone when patient more stable  Continue IV fluids with normal saline  Gastroenterology consult placed  She recently underwent a colonoscopy by GI on 08/30/2019 which showed moderately severe proctosigmoiditis with 2 linear ulcers noted in the mid rectum  Biopsies were taken to rule out CMV  Random biopsies were taken from ascending, transverse, descending, and sigmoid colon  Due to high risk of DVT in the setting of ulcerative colitis, will continue patient on DVT prophylaxis with Lovenox 40 mg subcu once daily even in the setting of rectal bleeding  Mild protein-calorie malnutrition ([address] )  Assessment & Plan  Malnutrition Findings:           BMI Findings: There is no height or weight on file to calculate BMI  Can consider nutrition consult  Mass of left axilla  Assessment & Plan  Due to suspicion of mass in left axilla, left axillary ultrasound was obtained during her last admission at [address] [place] [address]  Left axillary ultrasound showed-no fluid collections or suspicious masses detected  Left axillary lymph nodes, 1 of which is mildly prominent in size, however all visualized lymph nodes demonstrate normal intrinsic morphology  This is of doubtful clinical significance  Immunosuppressed status [facility])  Assessment & Plan  Patient is currently receiving IVIG treatment    She is also currently on chronic Remicade treatment  Mast cell activation syndrome [facility])  Assessment & Plan  The patient is seen regularly and follows up at [facility]  Continue the patient's scheduled medications from [facility]  Vitamin D deficiency  Assessment & Plan  Will need outpatient follow-up with PCP  Hypothyroidism  Assessment & Plan  Resume levothyroxine 50 mcg daily  VTE Prophylaxis: Enoxaparin (Lovenox)  / sequential compression device   Code Status:  Full code  POLST: POLST form is on file already (pre-hospital)  Discussion with family:  No    Anticipated Length of Stay:  Patient will be admitted on an Inpatient basis with an anticipated length of stay of  greater than 2 midnights  Justification for Hospital Stay:  Odynophagia with dysphagic    Total Time for Visit, including Counseling / Coordination of Care: 30 minutes  Greater than 50% of this total time spent on direct patient counseling and coordination of care  Chief Complaint:   Odynophagia with dysphagia for several months  History of Present Illness:    Clement To is a 53 y o  female patient with past medical history of ulcerative colitis and chronic Remicade therapy, mast cell activation syndrome, hypothyroidism, who was transferred from [address] [place] [address] to [facility] for ENT evaluation for dysphagia and odynophagia  She was initially admitted to [id number] [place] [address] on 08/26/2019 for severe proctitis due to her ulcerative colitis associated with rectal bleeding as well as severe sepsis  She was treated with IV cefepime and IV methylprednisolone during hospitalization at [address] [place] [address]   Eventually her blood cultures grew out gram-negative bacteremia and she was transitioned to oral Levaquin which she completed for 14 days beginning on 08/31/2019  She completed antibiotic therapy  She was seen by Infectious Diseases in consultation during that hospitalization    She was also seen by Gastroenterology during hospitalization at [address] [place] [address] while she was maintained on the IV methylprednisolone and IV fluids, she had colonoscopy performed which showed severe proctosigmoiditis with 2 linear ulcers in the mid rectum  Biopsies were taken, the results of which are still pending  She also was complaining of odynophagia and dysphagia during hospitalization since her tonsillectomy which was performed in April of 2019  It was recommended initially that she be evaluated by ENT in the outpatient setting, however the patient demanded to be seen by ENT during the hospitalization  Since [address] [place] [address] did not have access to ENT services, she was transferred to [place] [address] for further  ENT evaluation  Currently the patient states that she has difficulty swallowing both solids and liquids and she has to cut up her food in tiny pieces and eat her food in small bites with small sips of fluid to help it go down  She denies any current nausea or vomiting  She also states she was diagnosed with oral thrush after she completed antibiotic therapy at [facility], for which she was begun on nystatin  He 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 no other complaints at this time except for abdominal distension and some tenderness on the right side  She denies any current fevers or chills  Review of Systems:    Review of Systems   Constitutional: Negative  HENT: Positive for trouble swallowing  Eyes: Negative  Respiratory: Negative  Cardiovascular: Negative  Gastrointestinal: Positive for abdominal pain  Negative for constipation, diarrhea, nausea, rectal pain and vomiting  Genitourinary: Negative  Musculoskeletal: Negative  Skin: Negative  Neurological: Negative  Hematological: Negative          Past Medical and Surgical History:     Past Medical History:   Diagnosis Date    Anxiety     Asthma     Chronic kidney disease     Deep vein thrombosis (HCC)     Disease of thyroid gland     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    Generalized anxiety disorder 8/26/2017    Heart murmur     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HCC)     Migraine     Myocardial infarction [facility])     NSTEMI  pt denies, documented in cardio note    Pancreatitis     sphinger of     Psychiatric disorder     [facility]oid [facility]     Ulcerative colitis ([facility] )        Past Surgical History:   Procedure Laterality Date    APPENDECTOMY      CHOLECYSTECTOMY      EGD AND COLONOSCOPY N/A 9/12/2017    Procedure: EGD AND COLONOSCOPY;  Surgeon: Jcarlos Lofton MD;  [facility]: [facility]; Service: Gastroenterology    ERCP N/A 9/15/2017    Procedure: ENDOSCOPIC RETROGRADE CHOLANGIOPANCREATOGRAPHY (ERCP); Surgeon: Regina Man MD;  Location: [facility]; Service: Gastroenterology    HYSTERECTOMY      KNEE SURGERY Right     LAPAROSCOPIC ENDOMETRIOSIS FULGURATION      ORIF ANKLE FRACTURE Left     OH KNEE SCOPE,MED/LAT MENISECTOMY Left 5/12/2017    Procedure: POSSIBLE MEDIAL MENISECTOMY;  Surgeon: Arnaldo Soulier, MD;  Location: [facility];  Service: Orthopedics    OH KNEE [address] CART Left 5/12/2017    Procedure: KNEE ARTHROSCOPY EVALUATION, CHONDROPLASTY;  Surgeon: Arnaldo Soulier, MD;  Location: [facility];  Service: Orthopedics    OH LAP,DIAGNOSTIC ABDOMEN N/A 12/12/2017    Procedure: LAPAROSCOPY DIAGNOSTIC  WITH LYSIS OF ADHESIONS;  Surgeon: Tacho Merritt DO;  [facility];  Service: General       Meds/Allergies:    Prior to Admission medications    Medication Sig Start Date End Date Taking?  Authorizing Provider   butalbital-acetaminophen-caffeine (FIORICET,ESGIC) -40 mg per tablet Take 1 tablet by mouth every 8 (eight) hours as needed for headaches 2/14/19   Obie Gonzalez MD   cetirizine (ZyrTEC) 10 mg tablet Take 20 mg by mouth 2 (two) times a day     Historical Provider, MD   cholecalciferol (VITAMIN D) 400 units/mL Take 2 5 mL (1,000 Units total) by mouth daily 10/5/18   Xena Hernandez PA-C diphenhydrAMINE (BENADRYL) 25 mg tablet Take 50 mg by mouth as needed for itching     Historical Provider, MD   EPINEPHrine (EPIPEN 2-MARYSOL IJ) EpiPen   prn    Historical Provider, MD   Fexofenadine HCl ([facility]) Take by mouth    Historical Provider, MD   furosemide (LASIX) 20 mg tablet Take 20 mg by mouth daily as needed  3/22/19   Historical Provider, MD   hydrOXYzine HCL (ATARAX) 25 mg tablet  6/10/19   Historical Provider, MD   inFLIXimab (REMICADE IV) 700 mg Every 6 weeks    Historical Provider, MD   levothyroxine (LEVOXYL) 50 mcg tablet Levoxyl 50 mcg tablet    Historical Provider, MD   LORazepam (ATIVAN) 1 mg tablet Take 1 tablet (1 mg total) by mouth every 8 (eight) hours as needed for anxiety 5/28/19   Sheeba Triana PA-C   montelukast (SINGULAIR) 10 mg tablet Take 1 tablet (10 mg total) by mouth 2 (two) times a day 7/3/19   Keyonna Cedeno PA-C   nystatin (MYCOSTATIN) 500,000 units/5 mL suspension Apply 5 mL (500,000 Units total) to the mouth or throat 4 (four) times a day 6/21/19   Keyonna Cedeno PA-C   omalizumab Ignacio Alana) 150 mg Inject 300 mg under the skin every 28 days     Historical Provider, MD   pantoprazole (PROTONIX) 40 mg tablet Take 40 mg by mouth 2 (two) times a day     Historical Provider, MD   Probiotic Product (PROBIOTIC DAILY PO) Probiotic   floistar    Historical Provider, MD   ranitidine (ZANTAC) 150 MG capsule Take 300 mg by mouth 2 (two) times a day     Historical Provider, MD   temazepam (RESTORIL) 30 mg capsule Take 1 capsule (30 mg total) by mouth daily at bedtime 7/16/19   Kentrell Rivera MD   traMADol Nancy Quince) 50 mg tablet Take 1 tablet (50 mg total) by mouth every 6 (six) hours as needed for moderate pain or severe pain  Patient not taking: Reported on 8/26/2019 2/14/19   Thierno Castaneda, DO     I have reviewed home medications using allscripts  Allergies:    Allergies   Allergen Reactions    Amitriptyline Anaphylaxis     According to the patient she had nphylaxis    Aspergillus Fumigatus Other (See Comments), Hives and Swelling    Azathioprine Other (See Comments) and Anaphylaxis     pancreatitis  According to patient she needed Epipen    Sumatriptan Anaphylaxis     Bradycardia, sob, back pressure, head pain,throat tightness  According to the patient she had anphylaxis    Contrast [Iodinated Diagnostic Agents]      Pt states needs to be premedicated prior to injection    Corn Dextrin      Any corn based products    Corn Oil Hives    Gelatin     Histamine      Intolerance      Ibuprofen Hives and Throat Swelling    Malto Dextrin [Dextrin]     Other      Gel caps, maltodextrose, dextrose    Penicillium Notatum     Sulfa Antibiotics Hives and Other (See Comments)    Sulfate     Sulfites        Social History:     Marital Status: /Civil Union   Occupation:  Unknown  Patient Pre-hospital Living Situation:  Lives at home with  and children  Patient Pre-hospital Level of Mobility:  Ambulatory  Patient Pre-hospital Diet Restrictions:  None  Substance Use History:   Social History     Substance and Sexual Activity   Alcohol Use Never    Frequency: Never    Binge frequency: Never     Social History     Tobacco Use   Smoking Status Never Smoker   Smokeless Tobacco Never Used     Social History     Substance and Sexual Activity   Drug Use Not Currently       Family History:    Family History   Problem Relation Age of Onset    Ulcerative colitis Mother     Heart failure Father     Neuropathy Father     Macular degeneration Father     Diabetes Father     Asthma Daughter     Hypothyroidism Daughter     Heart disease Maternal Grandmother     Arthritis Maternal Grandmother     Arthritis Paternal Grandmother     Macular degeneration Paternal Grandmother     Lymphoma Paternal Grandfather     Heart failure Paternal Aunt     Heart failure Paternal [de-identified]     Breast cancer Paternal Aunt 47    Breast cancer additional onset Paternal Aunt 62  Hypothyroidism Paternal Aunt     Breast cancer Maternal Aunt     No Known Problems Maternal Grandfather        Physical Exam:     Vitals:   Blood Pressure: 121/89 (09/12/19 1929)  Pulse: 59 (09/12/19 1929)  Temperature: 97 9 °F (36 6 °C) (09/12/19 1929)  Temp Source: Tympanic (09/12/19 1929)  Respirations: 16 (09/12/19 1929)  SpO2: 98 % (09/12/19 1929)    Physical Exam   Constitutional: She is oriented to person, place, and time  She appears well-developed and well-nourished  No distress  HENT:   Head: Normocephalic and atraumatic  Eyes: Pupils are equal, round, and reactive to light  EOM are normal    Neck: Normal range of motion  Neck supple  No JVD present  Cardiovascular: Normal rate, regular rhythm and normal heart sounds  No murmur heard  Pulmonary/Chest: Effort normal and breath sounds normal  She has no wheezes  She has no rales  Abdominal: Soft  Bowel sounds are normal  She exhibits distension  There is tenderness  There is no rebound and no guarding  Musculoskeletal: She exhibits no edema or tenderness  Lymphadenopathy:     She has no cervical adenopathy  Neurological: She is alert and oriented to person, place, and time  Skin: Skin is warm and dry  She is not diaphoretic  Psychiatric: She has a normal mood and affect  Her behavior is normal  Judgment and thought content normal          Additional Data:     Lab Results: I have personally reviewed pertinent reports        Results from last 7 days   Lab Units 09/12/19  0540   WBC Thousand/uL 16 64*   HEMOGLOBIN g/dL 14 6   HEMATOCRIT % 43 5   PLATELETS Thousands/uL 388   NEUTROS PCT % 84*   LYMPHS PCT % 9*   MONOS PCT % 6   EOS PCT % 0     Results from last 7 days   Lab Units 09/12/19  0540   SODIUM mmol/L 136   POTASSIUM mmol/L 4 2   CHLORIDE mmol/L 100   CO2 mmol/L 29   BUN mg/dL 11   CREATININE mg/dL 0 73   ANION GAP mmol/L 7   CALCIUM mg/dL 8 6   ALBUMIN g/dL 3 2*   TOTAL BILIRUBIN mg/dL 0 40   ALK PHOS U/L 44*   ALT U/L 62 AST U/L 16   GLUCOSE RANDOM mg/dL 124                 Results from last 7 days   Lab Units 09/12/19  0540 09/11/19  0409 09/07/19  0556   PROCALCITONIN ng/ml <0 05 <0 05 <0 05       Imaging: I have personally reviewed pertinent reports  FL barium swallow video w speech    (Results Pending)       EKG, Pathology, and Other Studies Reviewed on Admission:   · EKG:  None available    [facility] Records Reviewed: Yes     ** Please Note: This note has been constructed using a voice recognition system   **

## 2019-09-13 NOTE — UTILIZATION REVIEW
Notification of Discharge  This is a Notification of Discharge from our facility [address]  Please be advised that this patient has been discharge from our facility  Below you will find the admission and discharge date and time including the patients disposition  PRESENTATION DATE: 8/26/2019  3:12 PM  OBS ADMISSION DATE:   IP ADMISSION DATE: 8/26/19 1802   DISCHARGE DATE: 9/12/2019  4:35 PM  DISPOSITION: [address]   Admission Orders listed below:  Admission Orders (From admission, onward)     Ordered        08/26/19 1854  Inpatient Admission (expected length of stay for this patient Order details is greater than two midnights)  Once         08/26/19 1802  Inpatient Admission (expected length of stay for this patient Order details is greater than two midnights)  Once,   Status:  Canceled                   Please contact the UR Department if additional information is required to close this patient's authorization/case  [address]  Utilization Review Department  Phone: [phone]; Fax [phone]  [email or web address]  ATTENTION: Please call with any questions or concerns to [phone]  and carefully listen to the prompts so that you are directed to the right person  Send all requests for admission clinical reviews, approved or denied determinations and any other requests to fax [phone]   All voicemails are confidential

## 2019-09-13 NOTE — CONSULTS
Consultation - ENT   Pratibha Cote 53 y o  female MRN: [id number]  Unit/Bed#: E2 -01 Encounter: [id number]      Assessment/Plan     Assessment:  1  Dysphagia with a history of aspiration  2  Dysphonia    Plan:  She has had history of dysphagia since her tonsillectomy in April  This was initially worked up in May at [facility] where she had a modified swallow study with speech therapy  Although I do not have a copy of the report this point time she relates a history of aspiration with multiple substances  Since there has been passage of 3 months since that last test I would recommend a repeat modified swallow study with speech to determine if she is still having aspiration  The dysphagia is most likely not related to her past history of tonsillectomy  Initial dysphagia is always expected but long-term dysphagia with aspiration is completely unrelated to the tonsillectomy  The patient was worried that she may have Candida in the laryngeal region  This was determined not to be the case  There is no evidence of either bacterial or fungal infection at this time  She has a history of getting IV infusion therapy for her chronic dehydration  This chronic dehydration is the most likely etiology of her dysphonia  Vocal cord evaluation is normal without any nodules, polyps or growths  With extended periods of phonation she states that her voice becomes more hoarse and she developed some slight pain  Increased hydration is the only therapy that is required for this  History of Present Illness   Physician Requesting Consult: Karolina Cowart MD  Reason for Consult / Principal Problem:  Dysphagia and dysphonia  HPI: Pratibha Cote is a 53y o  year old female who presents with a history of dysphasia following her tonsillectomy in May  This was completed at [facility] by Dr Mayda Power or  Initial evaluation was completed by him and he was unable to identify any etiology for this    She was ultimately admitted to [facility] and then transferred to [address] were workup continued  She was seen by speech therapy and had swallowing studies completed  The swallowing study is unavailable for review at this time but she relates a history of aspiration with multiple substances  Suggestions were given by the speech therapist   She also states that since that surgery she has noted increased nasal congestion, increased dehydration and a feeling of a dry mouth, and some voice changes  With increased phonation she has worsening of her hoarseness  She has a host of other medical problems (autoimmune issues) listed below including ulcerative colitis and rheumatoid arthritis  She was transferred from the [facility] yesterday for the ENT consultation since there was no ENT available at that facility  She has been followed by multiple specialties including GI   EGD had been completed previously which demonstrates some evidence of esophageal candidiasis and there was concern that she may have laryngeal and pharyngeal candidiasis  Things    Consults    Review of Systems   Constitutional: Negative for activity change, appetite change, fatigue, fever and unexpected weight change  HENT: Positive for congestion, trouble swallowing and voice change  Negative for ear discharge, ear pain, hearing loss, nosebleeds, postnasal drip, rhinorrhea, sinus pressure, sinus pain, sneezing, sore throat and tinnitus  Eyes: Negative  Negative for photophobia, pain, itching and visual disturbance  Respiratory: Negative  Negative for cough, chest tightness and shortness of breath  Cardiovascular: Negative  Negative for chest pain  Gastrointestinal: Negative  Negative for abdominal pain  Endocrine: Negative  Musculoskeletal: Negative  Negative for gait problem, neck pain and neck stiffness  Skin: Negative  Allergic/Immunologic: Negative  Negative for environmental allergies  Neurological: Negative  Negative for dizziness, speech difficulty, light-headedness and headaches  Hematological: Negative  Negative for adenopathy  Psychiatric/Behavioral: Negative  Negative for sleep disturbance  The patient is not nervous/anxious  Historical Information   Past Medical History:   Diagnosis Date    Anxiety     Asthma     Chronic kidney disease     Deep vein thrombosis (HCC)     Disease of thyroid gland     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    Generalized anxiety disorder 8/26/2017    Heart murmur     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HCC)     Migraine     [facility]ocardial infarction ([facility] )     NSTEMI  pt denies, documented in cardio note    Pancreatitis     sphinger of     Psychiatric disorder     RA (rheumatoid arthritis) ([facility]     [facility]      Past Surgical History:   Procedure Laterality Date    APPENDECTOMY      CHOLECYSTECTOMY      EGD AND COLONOSCOPY N/A 9/12/2017    Procedure: EGD AND COLONOSCOPY;  Surgeon: Reynaldo Caldwell MD;  [facility]; Service: Gastroenterology    ERCP N/A 9/15/2017    Procedure: ENDOSCOPIC RETROGRADE CHOLANGIOPANCREATOGRAPHY (ERCP); Surgeon: Rosibel Chao MD;  [facility];   Service: Gastroenterology    HYSTERECTOMY      KNEE SURGERY Right     LAPAROSCOPIC ENDOMETRIOSIS FULGURATION      ORIF ANKLE FRACTURE Left     WI KNEE SCOPE,MED/LAT MENISECTOMY Left 5/12/2017    Procedure: POSSIBLE MEDIAL MENISECTOMY;  Surgeon: Roseann Naranjo MD;  [facility];  Service: Orthopedics    WI KNEE [address] CART Left 5/12/2017    Procedure: KNEE ARTHROSCOPY EVALUATION, CHONDROPLASTY;  Surgeon: Roseann Naranjo MD;  [facility];  Service: Orthopedics    WI LAP,DIAGNOSTIC ABDOMEN N/A 12/12/2017    Procedure: LAPAROSCOPY DIAGNOSTIC  WITH LYSIS OF ADHESIONS;  Surgeon: Desean Atkinson DO;  Location:  MAIN OR;  Service: General     Social History   Social History     Substance and Sexual Activity Alcohol Use Never    Frequency: Never    Binge frequency: Never     Social History     Substance and Sexual Activity   Drug Use Not Currently     Social History     Tobacco Use   Smoking Status Never Smoker   Smokeless Tobacco Never Used     Family History:   Family History   Problem Relation Age of Onset    Ulcerative colitis Mother     Heart failure Father     Neuropathy Father     Macular degeneration Father     Diabetes Father     Asthma Daughter     Hypothyroidism Daughter     Heart disease Maternal Grandmother     Arthritis Maternal Grandmother     Arthritis Paternal Grandmother     Macular degeneration Paternal Grandmother     Lymphoma Paternal Grandfather     Heart failure Paternal Aunt     Heart failure Paternal Aunt     Breast cancer Paternal Aunt 47    Breast cancer additional onset Paternal Aunt 62    Hypothyroidism Paternal Aunt     Breast cancer Maternal Aunt     No Known Problems Maternal Grandfather        Meds/Allergies   current meds:   Current Facility-Administered Medications   Medication Dose Route Frequency    acetaminophen (TYLENOL) tablet 650 mg  650 mg Oral Q6H PRN    butalbital-acetaminophen-caffeine (FIORICET,ESGIC) -40 mg per tablet 1 tablet  1 tablet Oral Q8H PRN    cetirizine (ZyrTEC) tablet 20 mg  20 mg Oral BID    diphenhydrAMINE (BENADRYL) injection 50 mg  50 mg Intravenous Q4H PRN    enoxaparin (LOVENOX) subcutaneous injection 40 mg  40 mg Subcutaneous Q24H    HYDROmorphone (DILAUDID) injection 0 2 mg  0 2 mg Intravenous Q4H PRN    Or    HYDROmorphone (DILAUDID) injection 0 5 mg  0 5 mg Intravenous Q4H PRN    levothyroxine tablet 50 mcg  50 mcg Oral Early Morning    lidocaine (LIDODERM) 5 % patch 2 patch  2 patch Topical Daily    LORazepam (ATIVAN) tablet 1 mg  1 mg Oral Q8H PRN    methylnaltrexone (RELISTOR) subcutaneous injection 8 mg  8 mg Subcutaneous Daily    methylPREDNISolone sodium succinate (Solu-MEDROL) injection 40 mg  40 mg Intravenous Q8H [facility]    metoclopramide (REGLAN) injection 5 mg  5 mg Intravenous 4x Daily (AC & HS)    montelukast (SINGULAIR) tablet 10 mg  10 mg Oral BID    nystatin (MYCOSTATIN) oral suspension 500,000 Units  500,000 Units Swish & Spit 4x Daily    ondansetron (ZOFRAN) injection 4 mg  4 mg Intravenous Q6H PRN    pantoprazole (PROTONIX) EC tablet 40 mg  40 mg Oral BID AC    polyethylene glycol (MIRALAX) packet 17 g  17 g Oral BID    ranitidine (ZANTAC) tablet 300 mg  300 mg Oral BID AC    saccharomyces boulardii (FLORASTOR) capsule 250 mg  250 mg Oral BID    saliva substitute (MOUTH KOTE) mucosal solution 5 spray  5 spray Mouth/Throat TID    simethicone (MYLICON) chewable tablet 80 mg  80 mg Oral Q6H PRN    sodium chloride 0 9 % infusion  75 mL/hr Intravenous Continuous    temazepam (RESTORIL) capsule 30 mg  30 mg Oral HS    and PTA meds:   Prior to Admission Medications   Prescriptions Last Dose Informant Patient Reported? Taking?    EPINEPHrine (EPIPEN 2-MARYSOL IJ)   Yes No   Sig: EpiPen   prn   Fexofenadine HCl (MUCINEX ALLERGY PO)   Yes No   Sig: Take by mouth   LORazepam (ATIVAN) 1 mg tablet   No No   Sig: Take 1 tablet (1 mg total) by mouth every 8 (eight) hours as needed for anxiety   Probiotic Product (PROBIOTIC DAILY PO)   Yes No   Sig: Probiotic   floistar   butalbital-acetaminophen-caffeine (FIORICET,ESGIC) -40 mg per tablet   No No   Sig: Take 1 tablet by mouth every 8 (eight) hours as needed for headaches   cetirizine (ZyrTEC) 10 mg tablet   Yes No   Sig: Take 20 mg by mouth 2 (two) times a day    cholecalciferol (VITAMIN D) 400 units/mL   No No   Sig: Take 2 5 mL (1,000 Units total) by mouth daily   diphenhydrAMINE (BENADRYL) 25 mg tablet   Yes No   Sig: Take 50 mg by mouth as needed for itching    furosemide (LASIX) 20 mg tablet   Yes No   Sig: Take 20 mg by mouth daily as needed    hydrOXYzine HCL (ATARAX) 25 mg tablet   Yes No   inFLIXimab (REMICADE IV)   Yes No   Si mg Every 6 weeks   levothyroxine (LEVOXYL) 50 mcg tablet   Yes No   Sig: Levoxyl 50 mcg tablet   montelukast (SINGULAIR) 10 mg tablet   No No   Sig: Take 1 tablet (10 mg total) by mouth 2 (two) times a day   nystatin (MYCOSTATIN) 500,000 units/5 mL suspension   No No   Sig: Apply 5 mL (500,000 Units total) to the mouth or throat 4 (four) times a day   omalizumab (XOLAIR) 150 mg   Yes No   Sig: Inject 300 mg under the skin every 28 days    pantoprazole (PROTONIX) 40 mg tablet   Yes No   Sig: Take 40 mg by mouth 2 (two) times a day    ranitidine (ZANTAC) 150 MG capsule   Yes No   Sig: Take 300 mg by mouth 2 (two) times a day    temazepam (RESTORIL) 30 mg capsule   No No   Sig: Take 1 capsule (30 mg total) by mouth daily at bedtime   traMADol (ULTRAM) 50 mg tablet   No No   Sig: Take 1 tablet (50 mg total) by mouth every 6 (six) hours as needed for moderate pain or severe pain   Patient not taking: Reported on 8/26/2019      Facility-Administered Medications Last Administration Doses Remaining   cyanocobalamin injection 1,000 mcg 8/14/2019 11:35 AM    cyanocobalamin injection 1,000 mcg 6/18/2019  3:48 PM    cyanocobalamin injection 1,000 mcg 7/17/2019 11:55 AM           Allergies   Allergen Reactions    Amitriptyline Anaphylaxis     According to the patient she had nphylaxis    Aspergillus Fumigatus Other (See Comments), Hives and Swelling    Azathioprine Other (See Comments) and Anaphylaxis     pancreatitis  According to patient she needed Epipen    Sumatriptan Anaphylaxis     Bradycardia, sob, back pressure, head pain,throat tightness  According to the patient she had anphylaxis    Contrast [Iodinated Diagnostic Agents]      Pt states needs to be premedicated prior to injection    Corn Dextrin      Any corn based products    Corn Oil Hives    Gelatin     Histamine      Intolerance      Ibuprofen Hives and Throat Swelling    Malto Dextrin [Dextrin]     Other      Gel caps, maltodextrose, dextrose    Penicillium Notatum     Sulfa Antibiotics Hives and Other (See Comments)    Sulfate     Sulfites        Objective     No intake or output data in the 24 hours ending 09/13/19 0719    Invasive Devices     Peripheral Intravenous Line            Peripheral IV 09/13/19 Right;Ventral (anterior) Forearm less than 1 day                Physical Exam   Constitutional: She appears well-developed and well-nourished  No distress  HENT:   Head: Normocephalic and atraumatic  Right Ear: Tympanic membrane, external ear and ear canal normal    Left Ear: Tympanic membrane, external ear and ear canal normal    Nose: Nose normal  No nasal deformity, septal deviation or nasal septal hematoma  No epistaxis  Mouth/Throat: Uvula is midline and oropharynx is clear and moist  Mucous membranes are dry  Tonsils are 0 on the right  Tonsils are 0 on the left  No tonsillar exudate  Tongue is dry  No fissures   Neck: Trachea normal, normal range of motion and full passive range of motion without pain  Neck supple  No tracheal tenderness and no muscular tenderness present  No neck rigidity  No tracheal deviation ( Voice is clear with slight raspiness ) and no edema present  No thyroid mass and no thyromegaly present  Skin: She is not diaphoretic  Nasopharyngolaryngoscopy:    Verbal consent was obtained from the patient / parent  The scope was passed through the nasal cavity into the posterior nasopharynx  Evaluation of the pharynx and larynx was carried out with the following findings:    Eustachian Tube Orifice:  Patent bilaterally without edema or obstruction  Nasopharynx:  Smooth mucosa without adenoid bed or mass  Oropharynx:  No masses or lesions present  Vallecula:  No abnormalities, pink moist mucosa  Tongue Base:  Normal without masses or asymmetry  Epiglottis:  Normal mucosa on vallecular and laryngeal surfaces  Pyriform Aperture:  Mucosa appears pink and moist without masses      Arytenoid Mucosa/Aryepiglottic Folds:  Normal mucosa without evidence of edema, hyperemia, mass or ulcer  False Vocal cords:  Normal mucosa, no evidence of mass, lesion or edema  True Vocal cords: White in color, no masses, nodules, polyps, edema, paresis or paralysis of either cord  Subglottic Space: The airway is patent and there are no lesions  After careful evaluation of the above structures, the scope was removed from the nasal cavity  The patient tolerated the procedure well  Lab Results:   CBC:   Lab Results   Component Value Date    WBC 12 26 (H) 09/13/2019    HGB 14 1 09/13/2019    HCT 42 9 09/13/2019    MCV 97 09/13/2019     09/13/2019    MCH 31 9 09/13/2019    MCHC 32 9 09/13/2019    RDW 13 2 09/13/2019    MPV 8 8 (L) 09/13/2019    NRBC 0 09/13/2019   , CMP:   Lab Results   Component Value Date    SODIUM 137 09/13/2019    K 4 4 09/13/2019     09/13/2019    CO2 29 09/13/2019    BUN 9 09/13/2019    CREATININE 0 69 09/13/2019    CALCIUM 8 5 09/13/2019    AST 32 09/13/2019    ALT 90 (H) 09/13/2019    ALKPHOS 47 09/13/2019    EGFR 105 09/13/2019   , Coags: No results found for: PT, PTT, INR  Imaging Studies: I have personally reviewed pertinent reports  EKG, Pathology, and Other Studies: I have personally reviewed pertinent reports        Code Status: Level 1 - Full Code  Advance Directive and Living Will:      Power of :    POLST:      None

## 2019-09-13 NOTE — OCCUPATIONAL THERAPY NOTE
Occupational Therapy Evaluation      Damián Nicolas    9/13/2019    Principal Problem:    Severe sepsis ([facility] )  Active Problems:    Exacerbation of ulcerative colitis with rectal bleeding (HCC)    Vitamin D deficiency    Hypothyroidism    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[facility] )    Immunosuppressed status ([facility] )    Bacteremia due to Gram-negative bacteria    Mass of left axilla    Mild protein-calorie malnutrition ([facility] )    Odynophagia    Dysphagia      Past Medical History:   Diagnosis Date    Anxiety     Asthma     Chronic kidney disease     [facility]     Disease of thyroid gland     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    Generalized anxiety disorder 8/26/2017    Heart murmur     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HCC)     Migraine     Myocardial infarction ([facility] )     NSTEMI  pt denies, documented in cardio note    Pancreatitis     sphinger of     Psychiatric disorder     RA [facility]rheumatoid arthritis) ([facility] )     Ulcerative colitis ([facility] )        Past Surgical History:   Procedure Laterality Date    APPENDECTOMY      CHOLECYSTECTOMY      EGD AND COLONOSCOPY N/A 9/12/2017    Procedure: EGD AND COLONOSCOPY;  Surgeon: Asad Benjamin MD;  [facility]; Service: Gastroenterology    ERCP N/A 9/15/2017    Procedure: ENDOSCOPIC RETROGRADE CHOLANGIOPANCREATOGRAPHY (ERCP); Surgeon: Genna Pelaez MD;  [facility];   Service: Gastroenterology    HYSTERECTOMY      KNEE SURGERY Right     LAPAROSCOPIC ENDOMETRIOSIS FULGURATION      ORIF ANKLE FRACTURE Left     RI KNEE SCOPE,MED/LAT MENISECTOMY Left 5/12/2017    Procedure: POSSIBLE MEDIAL MENISECTOMY;  Surgeon: Steven Almazan MD;  [facility];  Service: Orthopedics    RI KNEE [address] CART Left 5/12/2017    Procedure: KNEE ARTHROSCOPY EVALUATION, CHONDROPLASTY;  Surgeon: Steven Almazan MD;  [facility];  Service: Orthopedics    RI LAP,DIAGNOSTIC ABDOMEN N/A 12/12/2017    Procedure: LAPAROSCOPY DIAGNOSTIC  WITH LYSIS OF ADHESIONS;  Surgeon: Flavio Leggett, ;  [facility];  Service: General        09/13/19 0947   Note Type   Note type Eval only   Restrictions/Precautions   Weight Bearing Precautions Per Order No   Other Precautions Multiple lines; Fall Risk;Pain   Pain Assessment   Pain Assessment 0-10   Pain Score 8   Pain Type Chronic pain   Pain Location Abdomen   Pain Orientation Right;Upper   Home Living   Type of Home House  (ranch style w finished basement)   Home Layout One level; Laundry in basement;Able to live on main level with bedroom/bathroom   Bathroom Shower/Tub Tub/shower unit   Bathroom Toilet Standard   Additional Comments denies use of DME    Prior Function   Level of [place] Independent with ADLs and functional mobility   Lives With Family  (spouse, 2 son (one in [place], one works nights))   Receives Help From Family   ADL Assistance Independent   IADLs Needs assistance   Falls in the last 6 months 0   Comments pt has good family support  Has a 3rd child who is in college  Lifestyle   Autonomy independent w self care and most home management, drives  On "bad" days, needs some help w cleaning house etc   Reciprocal Relationships supportive family   Intrinsic Gratification used to love to swim, gardening, be around people   is trying to get to learn to like reading   Psychosocial   Psychosocial (WDL) WDL   Subjective   Subjective "it gets so frustraing, I am used to be independent"   ADL   Eating Assistance 7  [address] 7  Independent   UB Bathing Deficit Increased time to complete   LB Bathing Assistance 6  Modified Independent   LB Bathing Deficit Increased time to complete   UB Dressing Assistance 7  Independent   UB Dressing Deficit Increased time to complete   LB Dressing Assistance 6  Modified independent   LB Dressing Deficit Increased time to complete   Toileting Assistance  6  Modified independent   Additional Comments increased time needed for completion due to decreased enducance   Bed Mobility   Supine to Sit 7  Independent   Transfers   Sit to Stand 6  Modified independent   Additional items Increased time required   Stand to Sit 6  Modified independent   Additional items Increased time required   Stand pivot 6  Modified independent   Functional Mobility   Functional Mobility 6  Modified independent   Additional Comments holding on to IV pole in room    Balance   Static Sitting Good   Dynamic Sitting Good   Static Standing Fair +   Dynamic Standing Fair   Activity Tolerance   Activity Tolerance Patient limited by fatigue   Medical Staff Made Aware PT Grzegorz   Nurse Made Aware ok to see per RN   RUE Assessment   RUE Assessment WFL   LUE Assessment   LUE Assessment WFL   Hand Function   Gross Motor Coordination Functional   Fine Motor Coordination Functional   Sensation   Light Touch No apparent deficits   Vision - Complex Assessment   Tracking Able to track stimulus in all quads without difficulty   Acuity Able to read clock/calendar on wall without difficulty   Additional Comments pt c/o occasional temporary "blackness in R visual field/eye when coming from supine to sit  this last only for a short while and has been happening for a couple of years  Perception   Inattention/Neglect Appears intact   Cognition   Overall Cognitive Status WFL   Arousal/Participation Cooperative; Alert   Attention Within functional limits   Orientation Level Oriented X4   Memory Within functional limits   Following Commands Follows all commands and directions without difficulty   Assessment   Limitation Decreased high-level ADLs; Decreased endurance   Assessment Pt is a 53 y o  female seen for OT evaluation s/p admit to [facility] on 9/12/2019 w/ Severe sepsis ([address] )  SHe was a transfer from [address] [place] [address] where she has been admitted since 8/26/19   Pt also w newly dx dysphagia, worsening since tonsillectomy in May and with decreased PO intake  Pt also with exacerbation of ulcerative colitits w rectal bleeding  Comorbidities affecting pt's functional performance at time of assessment include: generalized anxiety disorder, chronic back pain, CHF, fibromyalgia, pulmonary edema, OA ankle and foot, mast cell act  syndrome  pt also with recent IVIG treatment  Personal factors affecting pt at time of IE include:steps to enter environment and difficulty performing IADLS   Prior to admission, pt was living at home w spouse and 2 older kids  She lives in a ranch style home w 9 vs 3 (front vs back) steps to enter  There is FF to finished basement where her laundry is  Typically she is independent w self care, occasionally assisted w home management  Upon evaluation: Pt demonstrates the ability to complete self care self w set up and increased time for completion  She does fatigue easily  Pt educated on self pacing skills and energy conservation techqniues  Discussed benefits of a shower seat for home, take frequent breaks, plan ahead etc with good understading  Pt states on good days she sometimes tends to overdo things, and can barely get OOB the next day  Pt educated on planning ahead, pacing self etc as well as completing some things while seated vs standing w good understanding  Pt scored  70 /100 on the barthel index  Based on findings from OT evaluation and functional performance deficits, pt has been identified as a  High complexity evaluation  From OT standpoint, recommendation at time of d/c would be home w family support  At this time, onoging skilled OT services while in acute care is not indicated  DC OT     Goals   Patient Goals to get better   Plan   OT Frequency Eval only   Recommendation   OT Discharge Recommendation Home with family support   OT - OK to Discharge Yes   Barthel Index   Feeding 10   Bathing 0   Grooming Score 5   Dressing Score 10   Bladder Score 10   Bowels Score 10   Toilet Use Score 10   Transfers (Bed/Chair) Score 15   Mobility (Level Surface) Score 0   Stairs Score 0   Barthel Index Score 70

## 2019-09-13 NOTE — PLAN OF CARE
Problem: PHYSICAL THERAPY ADULT  Goal: Performs mobility at highest level of function for planned discharge setting  See evaluation for individualized goals  Description  Treatment/Interventions: Functional transfer training, Elevations, Therapeutic exercise, Endurance training, Gait training, Spoke to nursing, OT          See flowsheet documentation for full assessment, interventions and recommendations  Note:   Prognosis: Fair  Problem List: Decreased endurance, Impaired balance, Decreased mobility, Decreased coordination, Impaired sensation, Pain  Assessment: Pt is a 53 y o  female seen for PT evaluation [facility]  Two pt identifiers were used to confirm  Pt presented with severe sepsis on 9/12/19  Pt was admitted with a primary dx of: severe sepsis  PT now consulted for assessment of mobility and d/c needs  Pts current co morbidities effecting treatment include: ulcerative colitis, hypothyroidism, mast cell activation syndrome, anxiety, dysphagia and personal factors including 3 vs 7 VIMAL home environment  Pt currently lives in a ranch style home with her  and two sons  Pts current clinical presentation is Unstable/ Unpredictable (high complexity) due to Ongoing medical management for primary dx, decreased activity tolerance compared to baseline, fall risk, multiple lines, decline in overall functional mobility status  Prior to admission, pt was I with ambulation without the use of an AD as per pt  Upon evaluation, pt currently is independent for bed mobility; mod I for transfers and mod I for ambulation without an AD  Pt displays decreased step and stride length, inconsistent winnie, decreased gait speed, decreased foot clearance, LLE foot drop  Pt presents at PT eval functioning below baseline and currently w/ overall mobility deficits secondary to: decreased endurance, impaired balance, impaired coordination, decreased mobility, pain   Pt currently at a fall risk secondary to impairments listed above  Based on PT evaluation, pt will continue to benefit from skilled acute PT interventions to address stated impairments; to maximize functional mobility; for ongoing pt/ family training; and DME needs  At conclusion of PT session pt was left standing in room using IV for support, agreeable to participate in follow up PT session  Provided pt education regarding PT plan to improve functional mobility status  PT is currently recommending home with family support when medically stable  Pt agreeable to plan and goals as stated on evaluation  PT will continue to follow during hospital stay  Recommendation: Home with family support     PT - OK to Discharge: Yes    See flowsheet documentation for full assessment

## 2019-09-13 NOTE — PROGRESS NOTES
Progress Note - Veldon Moritz 1975, 53 y o  female MRN: [id number]    Unit/Bed#: E2 -01 Encounter: [id number]    Primary Care Provider: Pito Vivas PA-C   Date and time admitted to hospital: 9/12/2019  5:40 PM        Odynophagia  Assessment & Plan  Patient has had progressively worsening odynophagia secondary to a tonsillectomy performed in April 2019  ENT consultation appreciated, no evidence of bacterial or fungal infection at this time  Chronic hydration likely etiology of patient's dysphonia, vocal cord evaluation normal without any nodules, polyps or growths  Maintained on oral nystatin    Dysphagia  Assessment & Plan  Patient has had progressively worsening dysphagia secondary to a tonsillectomy performed in April 2019  Patient currently tolerating oral intake states she does need to drink more fluids, etiology still unclear  ENT and GI evaluation appreciated  Given patient's dry eyes, oral dysphagia due to having a dry mouth concern for possible Sjogren  Will send for Sjogren antibodies, patient will need to follow with Rheumatology outpatient  Will also need to follow with Ophthalmology outpatient   Continue supportive care    * Severe sepsis [facility])  Assessment & Plan  Patient was at [facility] with severe sepsis that was present on admission there, due to gram-negative bacteremia  She was seen in consultation by Infectious Disease and was initially treated with IV cefepime  She was then transitioned to PO Levaquin 750 mg daily on 08/31 and she completed a 14 day course of antibiotic therapy  Patient afebrile, leukocytosis secondary to steroid use however this is trending down    Bacteremia due to Gram-negative bacteria  Assessment & Plan  Patient initially presented to [address] [place] [address] with severe sepsis secondary to gram-negative bacteremia  She was initially treated with IV cefepime and was seen in consultation by Infectious Diseases    She was eventually transitioned to PO Levaquin 750 mg daily on 08/31 and she completed a 14 day course of antibiotic therapy  Mild protein-calorie malnutrition ([address] )  Assessment & Plan  Malnutrition Findings:           BMI Findings: Body mass index is 21 9 kg/m²  Can consider nutrition consult  Mass of left axilla  Assessment & Plan  Due to suspicion of mass in left axilla, left axillary ultrasound was obtained during her last admission at [facility]  Left axillary ultrasound showed-no fluid collections or suspicious masses detected  Left axillary lymph nodes, 1 of which is mildly prominent in size, however all visualized lymph nodes demonstrate normal intrinsic morphology  This is of doubtful clinical significance  Immunosuppressed status [facility])  Assessment & Plan  Patient is currently receiving IVIG treatment  She is also currently on chronic Remicade treatment  Mast cell activation syndrome [facility])  Assessment & Plan  The patient is seen regularly and follows up at [facility]  Continue the patient's scheduled medications from [facility]  Hypothyroidism  Assessment & Plan  Resume levothyroxine 50 mcg daily  Vitamin D deficiency  Assessment & Plan  Will need outpatient follow-up with PCP  Exacerbation of ulcerative colitis with rectal bleeding [facility])  Assessment & Plan  The patient was seen in consultation by GI for exacerbation of her ulcerative colitis  She is still on Solu-Medrol 40 mg IV Q 8 hrs  She recently underwent a colonoscopy by GI on 08/30/2019 which showed moderately severe proctosigmoiditis with 2 linear ulcers noted in the mid rectum  Biopsies were taken to rule out CMV  Random biopsies were taken from ascending, transverse, descending, and sigmoid colon    GI input appreciated, can likely transition to oral steroids in the next 24 hours    VTE Pharmacologic Prophylaxis:   Pharmacologic:  Lovenox    Patient Centered Rounds: I have performed bedside rounds with nursing staff today  Discussions with Specialists or Other Care Team Provider:  Gastroenterology, ENT    Time Spent for Care: 30 minutes  More than 50% of total time spent on counseling and coordination of care as described above  Current Length of Stay: 1 day(s)    Current Patient Status: Inpatient   Certification Statement: The patient will continue to require additional inpatient hospital stay due to dysphagia, abdominal pain    Discharge Plan / Estimated Discharge Date: 2-3 days    Code Status: Level 1 - Full Code      Subjective:   Patient seen and examined at bedside, complaining of some mild abdominal discomfort, tolerating diet  Objective:     Vitals:   Temp (24hrs), Av 8 °F (36 6 °C), Min:97 5 °F (36 4 °C), Max:97 9 °F (36 6 °C)    Temp:  [97 5 °F (36 4 °C)-97 9 °F (36 6 °C)] 97 5 °F (36 4 °C)  HR:  [59-87] 87  Resp:  [15-20] 15  BP: (101-121)/(57-89) 101/65  SpO2:  [97 %-98 %] 97 %  Body mass index is 21 9 kg/m²  Input and Output Summary (last 24 hours): Intake/Output Summary (Last 24 hours) at 2019 1717  Last data filed at 2019 1154  Gross per 24 hour   Intake 967 5 ml   Output --   Net 967 5 ml       Physical Exam:    Constitutional: Patient is oriented to person, place and time, no acute distress  HEENT:  Normocephalic, atraumatic, EOMI, PERRLA, no scleral icterus, no pallor, moist oral mucosa  Neck:  Supple, no masses, no thyromegaly, no bruits Normal range of motion  Lymph nodes:  No lymphadenopathy  Cardiovascular: Normal S1S2, RRR, No murmurs/rubs/gallops appreciated  Pulmonary:  Bilateral air entry, No rhonchi/rales/wheezing appreciated  Abdominal: Soft, Bowel sounds present, Non-tender, Non-distended, No rebound/guarding, no hepatomegaly   Musculoskeletal: No tenderness/abnormality   Extremities:  No cyanosis, clubbing or edema   Peripheral pulses palpable and equal bilaterally  Neurological: Cranial nerves II-XII grossly intact, sensation intact, otherwise no focal neurological symptoms  Skin: Skin is warm and dry, no rashes  Additional Data:     Labs:    Results from last 7 days   Lab Units 09/13/19  0511   WBC Thousand/uL 12 26*   HEMOGLOBIN g/dL 14 1   HEMATOCRIT % 42 9   PLATELETS Thousands/uL 376   NEUTROS PCT % 89*   LYMPHS PCT % 7*   MONOS PCT % 3*   EOS PCT % 0     Results from last 7 days   Lab Units 09/13/19  0511   POTASSIUM mmol/L 4 4   CHLORIDE mmol/L 101   CO2 mmol/L 29   BUN mg/dL 9   CREATININE mg/dL 0 69   CALCIUM mg/dL 8 5   ALK PHOS U/L 47   ALT U/L 90*   AST U/L 32            I Have Reviewed All Lab Data Listed Above  Recent Cultures (last 7 days):     Results from last 7 days   Lab Units 09/11/19  0424 09/11/19  0423   BLOOD CULTURE  No Growth at 48 hrs  No Growth at 48 hrs         Last 24 Hours Medication List:     Current Facility-Administered Medications:  acetaminophen 650 mg Oral Q6H PRN Marlon Retana MD    barium sulfate 1 tablet Oral Once in imaging Marlon Retana MD    butalbital-acetaminophen-caffeine 1 tablet Oral Q8H PRN Marlon Retana MD    cetirizine 20 mg Oral BID Marlon Retana MD    diphenhydrAMINE 50 mg Intravenous Q4H PRN Ayesha Carr MD    enoxaparin 40 mg Subcutaneous Q24H Ayesha Carr MD    HYDROmorphone 0 2 mg Intravenous Q4H PRN Marlon Retana MD    Or        HYDROmorphone 0 5 mg Intravenous Q4H PRN Marlon Retana MD    levothyroxine 50 mcg Oral Early Morning Ayesha Carr MD    lidocaine 2 patch Topical Daily Ayesha Carr MD    LORazepam 1 mg Oral Q8H PRN Marlon Retana MD    methylnaltrexone bromide 8 mg Subcutaneous Daily Ayesha Carr MD    methylPREDNISolone sodium succinate 20 mg Intravenous Q8H Satnam Le MD    metoclopramide 5 mg Intravenous 4x Daily (AC & HS) Marlon Retana MD    montelukast 10 mg Oral BID Marlon Retana MD    nystatin 500,000 Units Swish & Spit 4x Daily Ayesha Carr MD    ondansetron 4 mg Intravenous Q6H PRN Marlon Retana MD    pantoprazole 40 mg Oral BID AC Marlon Retana MD polyethylene glycol 17 g Oral BID Ramakrishna Teran MD    ranitidine 300 mg Oral BID AC Ayesha Carr MD    saccharomyces boulardii 250 mg Oral BID Ramakrishna Teran MD    saliva substitute 5 spray Mouth/Throat TID Ramakrishna Teran MD    simethicone 80 mg Oral Q6H PRN Ramakrishna Teran MD    sodium chloride 75 mL/hr Intravenous Continuous Ramakrishna Teran MD Last Rate: 75 mL/hr (09/13/19 1154)   temazepam 30 mg Oral HS Ramakrishna Teran MD         Today, Patient Was Seen By: Dominic Conner MD

## 2019-09-13 NOTE — ASSESSMENT & PLAN NOTE
Malnutrition Findings:           BMI Findings: Body mass index is 21 9 kg/m²  Can consider nutrition consult

## 2019-09-13 NOTE — ASSESSMENT & PLAN NOTE
Patient initially presented to [address] [place] [address] with severe sepsis secondary to gram-negative bacteremia  She was initially treated with IV cefepime and was seen in consultation by Infectious Diseases  She was eventually transitioned to PO Levaquin 750 mg daily on 08/31 and she completed a 14 day course of antibiotic therapy

## 2019-09-13 NOTE — ASSESSMENT & PLAN NOTE
The patient is seen regularly and follows up at [facility]  Continue the patient's scheduled medications from [facility]

## 2019-09-13 NOTE — PROCEDURES
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    Generalized anxiety disorder 8/26/2017    Heart murmur     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HCC)     Migraine     Myocardial infarction ([facility] )     NSTEMI  pt denies, documented in cardio note    Pancreatitis     sphinger of     Psychiatric disorder     [facility]     Ulcerative colitis [facility]         Past Surgical History  Past Surgical History:   Procedure Laterality Date    APPENDECTOMY      CHOLECYSTECTOMY      EGD AND COLONOSCOPY N/A 9/12/2017    Procedure: EGD AND COLONOSCOPY;  Surgeon: Jcarlos Lofton MD;  [facility]; Service: Gastroenterology    ERCP N/A 9/15/2017    Procedure: ENDOSCOPIC RETROGRADE CHOLANGIOPANCREATOGRAPHY (ERCP); Surgeon: Regina Man MD;  [facility]; Service: Gastroenterology    HYSTERECTOMY      KNEE SURGERY Right     LAPAROSCOPIC ENDOMETRIOSIS FULGURATION      ORIF ANKLE FRACTURE Left     MA KNEE SCOPE,MED/LAT MENISECTOMY Left 5/12/2017    Procedure: POSSIBLE MEDIAL MENISECTOMY;  Surgeon: Arnaldo Soulier, MD;  Location: [facility];  Service: Orthopedics    MA KNEE [address] CART Left 5/12/2017    Procedure: KNEE ARTHROSCOPY EVALUATION, CHONDROPLASTY;  Surgeon: Arnaldo Soulier, MD;  [facility]: [facility];  Service: Orthopedics    MA LAP,DIAGNOSTIC ABDOMEN N/A 12/12/2017    Procedure: LAPAROSCOPY DIAGNOSTIC  WITH LYSIS OF ADHESIONS;  Surgeon: Tacho Merritt DO;  Location:  MAIN OR;  Service: General     Assessment: (per ent this am)  1  Dysphagia with a history of aspiration  2  Dysphonia  Plan:  She has had history of dysphagia since her tonsillectomy in April    This was initially worked up in May at [facility] where she had a modified swallow study with speech therapy  Although I do not have a copy of the report this point time she relates a history of aspiration with multiple substances  Since there has been passage of 3 months since that last test I would recommend a repeat modified swallow study with speech to determine if she is still having aspiration  The dysphagia is most likely not related to her past history of tonsillectomy  Initial dysphagia is always expected but long-term dysphagia with aspiration is completely unrelated to the tonsillectomy  The patient was worried that she may have Candida in the laryngeal region  This was determined not to be the case  There is no evidence of either bacterial or fungal infection at this time  She has a history of getting IV infusion therapy for her chronic dehydration  This chronic dehydration is the most likely etiology of her dysphonia  Vocal cord evaluation is normal without any nodules, polyps or growths  With extended periods of phonation she states that her voice becomes more hoarse and she developed some slight pain  Increased hydration is the only therapy that is required for this       History of Present Illness  Physician Requesting Consult: Luis Homans, MD  Reason for Consult / Principal Problem:  Dysphagia and dysphonia  HPI: Barry Glaser is a 53y o  year old female who presents with a history of dysphasia following her tonsillectomy in May  This was completed at [facility] by Dr Jazmyne Jade or  Initial evaluation was completed by him and he was unable to identify any etiology for this  She was ultimately admitted to [facility] and then transferred to [address] were workup continued  She was seen by speech therapy and had swallowing studies completed  The swallowing study is unavailable for review at this time but she relates a history of aspiration with multiple substances  Suggestions were given by the speech therapist   She also states that since that surgery she has noted increased nasal congestion, increased dehydration and a feeling of a dry mouth, and some voice changes  With increased phonation she has worsening of her hoarseness  She has a host of other medical problems (autoimmune issues) listed below including ulcerative colitis and rheumatoid arthritis  She was transferred from the [address] [place] [address],[address] yesterday for the ENT consultation since there was no ENT available at that facility  She has been followed by multiple specialties including GI   EGD had been completed previously which demonstrates some evidence of esophageal candidiasis and there was concern that she may have laryngeal and pharyngeal candidiasis  Things    Video Barium Swallow Study    Summary:  Pt presented w/ oral and pharyngeal stages that were WNL  Reports dry mouth and that she needs drinks to wash the food down  Pt reported feeling as if food was sticking in her throat x 1  She was shown on the monitor that nothing was there, but she did have a small amt of stasis in the esophagus  Images are on PACS for review  Recommendations:  Diet: regular, add moisture/condiments as needed  Liquids: thin  Meds:as tolerated  No issues w/ barium pill today  Strategies: alternate w/ liquids  Upright position  F/u ST tx: No  Results reviewed with: pt, physician  If a dedicated assessment of the esophagus is desired, consider esophagram/barium swallow or EGD  Patient's goal:  wants to known why she is dehydrated    Pt is a 48yof referred for VBS due to reasons stated above  Previous VBS:  Reported she had one in [place] and that she aspirated thin liquids  States she is no longer getting ST and has been tolerating a regular diet  Current Diet:  Moist foods/regular  Premorbid diet:  same  Dentition:  natural  O2 requirement:  none  Oral mech:  Strength and ROM:  Wnl  Reports dry mouth     Vocal Quality/Speech:  Vocal/glottal quintana  Cognitive status:  A&O    Consistencies administered: Barium laden applesauce, nutrigrain bar, hard cookie, rice krispie treat, thin liquids, 13mm barium pill w/ thin that she was unable to transfer orally  Given in apple sauce  Pt viewed standing in lateral position    Oral stage:  WNL  Lip closure:+  Mastication: prolonged but WNL  Bolus formation:+  Bolus control:+  Transfer:+  Residue:trace/none    Pharyngeal stage: WNL  Swallow promptness:+  Spill to valleculae:-  Spill to pyriforms:-  Epiglottic inversion:+  Laryngeal excursion:+  Pharyngeal constriction:+  Vallecular retention:-  Pyriform retention:-  PPW coating:-  Osteophytes:-  CP prominence:-  Transient penetration:-  Epiglottic undercoat:  Penetration:-  Aspiration:-  Strategies:requested water for solid transfer x 1    Screening of Esophageal stage:   WNL  Mild stasis

## 2019-09-13 NOTE — CONSULTS
Patient MRN: [id number]  Date of Service: 9/13/2019  Referring Physician: Karolina Cowart MD  Provider Creating Note: Reji Woodruff PA-C  PCP: Lorie Tolbert  Reason for Consult:  Ulcerative colitis  JORGE Cote is a 53 y o  female who was admitted with Severe sepsis ([facility] [address] )  She  was transferred from [facility]  to [facility] yesterday  She has a history of mast cell disease and a suspected immune deficiency, has been on IVIG in the past   She has a long GI history including eosinophilic esophagitis (per patient), sphincter of Oddi dysfunction and left-sided ulcerative colitis  She last had an ERCP in the [facility] on 9/15/2017 but no intervention was needed  She follows with Dr Cornel Chavez at [facility] for her colitis  She had been on Remicade for a year and a half  Over this past summer, she experienced decreased frequency of bowel movements, more bleeding, and pasty stools like applesauce  She had a Remicade infusion in mid August at which time she felt as if her throat was closing  She denies a rash  She was admitted to [facility] 8/26 with gram-negative kika sepsis  While there, she was seen by [facility]  She had an upper endoscopy on 08/30 that showed mild mucosal ringing in the proximal and mid esophagus with a normal stomach and duodenum  Biopsies were negative for eosinophilic esophagitis and celiac  A colonoscopy on 08/30 showed mild acute colitis in the rectosigmoid  Biopsies were negative for dysplasia  A cecal polypoid lesion was removed but biopsy showed only normal mucosa  Biopsies of the ascending, transverse and descending colon were negative for active colitis  She was treated with IV Solu-Medrol and this was planned to be transitioned to oral prednisone but she was transferred here prior  She complains of dysphagia ever since her tonsillectomy in April    She reports a history of  eosinophilic esophagitis but I am unsure where this was diagnosed  She says she has been on budesonide in the past   Her bowel movements have been loose over the last few days  She did receive several doses of relistor, reglan and MiraLax  She also reports episodes of dehydration, requiring IV rehydration every few weeks which she has done at [facility]  Past Medical History:   Diagnosis Date    Anxiety     Asthma     Chronic kidney disease     Deep vein thrombosis (HCC)     Disease of thyroid gland     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    Generalized anxiety disorder 8/26/2017    Heart murmur     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HCC)     Migraine     Myocardial infarction ([facility] [address] )     NSTEMI  pt denies, documented in cardio note    Pancreatitis     sphinger of     Psychiatric disorder     RA (rheumatoid arthritis) ([facility] [address] )     Ulcerative colitis ([facility] [address] )      Past Surgical History:   Procedure Laterality Date    APPENDECTOMY      CHOLECYSTECTOMY      EGD AND COLONOSCOPY N/A 9/12/2017    Procedure: EGD AND COLONOSCOPY;  Surgeon: Jorge Collier MD;  [facility]; Service: Gastroenterology    ERCP N/A 9/15/2017    Procedure: ENDOSCOPIC RETROGRADE CHOLANGIOPANCREATOGRAPHY (ERCP); Surgeon: Tal Reese MD;  [facility]: [facility];   Service: Gastroenterology    HYSTERECTOMY      KNEE SURGERY Right     LAPAROSCOPIC ENDOMETRIOSIS FULGURATION      ORIF ANKLE FRACTURE Left     CT KNEE SCOPE,MED/LAT MENISECTOMY Left 5/12/2017    Procedure: POSSIBLE MEDIAL MENISECTOMY;  Surgeon: Santi Adorno MD;  [facility];  Service: Orthopedics    CT KNEE [address] CART Left 5/12/2017    Procedure: KNEE ARTHROSCOPY EVALUATION, CHONDROPLASTY;  Surgeon: Santi Adorno MD;  [facility];  Service: Orthopedics    CT LAP,DIAGNOSTIC ABDOMEN N/A 12/12/2017    Procedure: LAPAROSCOPY DIAGNOSTIC  WITH LYSIS OF ADHESIONS;  Surgeon: Gisel Mills DO;  Location:  MAIN OR;  Service: General     Medications  Home Medications:   Prior to Admission medications    Medication Sig Start Date End Date Taking?  Authorizing Provider   butalbital-acetaminophen-caffeine (FIORICET,ESGIC) -40 mg per tablet Take 1 tablet by mouth every 8 (eight) hours as needed for headaches 2/14/19   Peggie Gosselin, MD   cetirizine (ZyrTEC) 10 mg tablet Take 20 mg by mouth 2 (two) times a day     Historical Provider, MD   cholecalciferol (VITAMIN D) 400 units/mL Take 2 5 mL (1,000 Units total) by mouth daily 10/5/18   Micah Mckeon PA-C   diphenhydrAMINE (BENADRYL) 25 mg tablet Take 50 mg by mouth as needed for itching     Historical Provider, MD   EPINEPHrine (EPIPEN 2-MARYSOL IJ) EpiPen   prn    Historical Provider, MD   Fexofenadine HCl (MUCINEX ALLERGY PO) Take by mouth    Historical Provider, MD   furosemide (LASIX) 20 mg tablet Take 20 mg by mouth daily as needed  3/22/19   Historical Provider, MD   hydrOXYzine HCL (ATARAX) 25 mg tablet  6/10/19   Historical Provider, MD   inFLIXimab (REMICADE IV) 700 mg Every 6 weeks    Historical Provider, MD   levothyroxine (LEVOXYL) 50 mcg tablet Levoxyl 50 mcg tablet    Historical Provider, MD   LORazepam (ATIVAN) 1 mg tablet Take 1 tablet (1 mg total) by mouth every 8 (eight) hours as needed for anxiety 5/28/19   Sheeba Triana PA-C   montelukast (SINGULAIR) 10 mg tablet Take 1 tablet (10 mg total) by mouth 2 (two) times a day 7/3/19   Diamond Naqvi PA-C   nystatin (MYCOSTATIN) 500,000 units/5 mL suspension Apply 5 mL (500,000 Units total) to the mouth or throat 4 (four) times a day 6/21/19   Diamond Naqvi PA-C   omalizumab Brennan Bash) 150 mg Inject 300 mg under the skin every 28 days     Historical Provider, MD   pantoprazole (PROTONIX) 40 mg tablet Take 40 mg by mouth 2 (two) times a day     Historical Provider, MD   Probiotic Product (PROBIOTIC DAILY PO) Probiotic   floistar    Historical Provider, MD   ranitidine (ZANTAC) 150 MG capsule Take 300 mg by mouth 2 (two) times a day     Historical Provider, MD temazepam (RESTORIL) 30 mg capsule Take 1 capsule (30 mg total) by mouth daily at bedtime 7/16/19   Idris Perez MD   traMADol Migue Carbon) 50 mg tablet Take 1 tablet (50 mg total) by mouth every 6 (six) hours as needed for moderate pain or severe pain  Patient not taking: Reported on 8/26/2019 2/14/19   Andriy BarrazaportDO       Inhouse Medications    Current Facility-Administered Medications:     acetaminophen (TYLENOL) tablet 650 mg, 650 mg, Oral, Q6H PRN    butalbital-acetaminophen-caffeine (FIORICET,ESGIC) -40 mg per tablet 1 tablet, 1 tablet, Oral, Q8H PRN    cetirizine (ZyrTEC) tablet 20 mg, 20 mg, Oral, BID, 20 mg at 09/12/19 2140    diphenhydrAMINE (BENADRYL) injection 50 mg, 50 mg, Intravenous, Q4H PRN, 50 mg at 09/13/19 0859    enoxaparin (LOVENOX) subcutaneous injection 40 mg, 40 mg, Subcutaneous, Q24H, 40 mg at 09/13/19 0946    HYDROmorphone (DILAUDID) injection 0 2 mg, 0 2 mg, Intravenous, Q4H PRN **OR** HYDROmorphone (DILAUDID) injection 0 5 mg, 0 5 mg, Intravenous, Q4H PRN, 0 5 mg at 09/13/19 1016    levothyroxine tablet 50 mcg, 50 mcg, Oral, Early Morning, 50 mcg at 09/13/19 0606    lidocaine (LIDODERM) 5 % patch 2 patch, 2 patch, Topical, Daily, 2 patch at 09/13/19 0859    LORazepam (ATIVAN) tablet 1 mg, 1 mg, Oral, Q8H PRN, 1 mg at 09/13/19 0859    methylnaltrexone (RELISTOR) subcutaneous injection 8 mg, 8 mg, Subcutaneous, Daily, 8 mg at 09/12/19 2140    methylPREDNISolone sodium succinate (Solu-MEDROL) injection 40 mg, 40 mg, Intravenous, Q8H KARISSA, 40 mg at 09/13/19 0606    metoclopramide (REGLAN) injection 5 mg, 5 mg, Intravenous, 4x Daily (AC & HS), 5 mg at 09/13/19 0606    montelukast (SINGULAIR) tablet 10 mg, 10 mg, Oral, BID, 10 mg at 09/12/19 2143    nystatin (MYCOSTATIN) oral suspension 500,000 Units, 500,000 Units, Swish & Spit, 4x Daily, 500,000 Units at 09/13/19 0859    ondansetron (ZOFRAN) injection 4 mg, 4 mg, Intravenous, Q6H PRN    pantoprazole (PROTONIX) EC tablet 40 mg, 40 mg, Oral, BID AC    polyethylene glycol (MIRALAX) packet 17 g, 17 g, Oral, BID, 17 g at 09/12/19 2022    ranitidine (ZANTAC) tablet 300 mg, 300 mg, Oral, BID AC    saccharomyces boulardii (FLORASTOR) capsule 250 mg, 250 mg, Oral, BID, 250 mg at 09/13/19 0900    saliva substitute (MOUTH KOTE) mucosal solution 5 spray, 5 spray, Mouth/Throat, TID, 5 spray at 09/13/19 0907    simethicone (MYLICON) chewable tablet 80 mg, 80 mg, Oral, Q6H PRN    sodium chloride 0 9 % infusion, 75 mL/hr, Intravenous, Continuous, 75 mL/hr at 09/12/19 2300    temazepam (RESTORIL) capsule 30 mg, 30 mg, Oral, HS, 30 mg at 09/12/19 2328    Allergies  Allergies   Allergen Reactions    Amitriptyline Anaphylaxis     According to the patient she had nphylaxis    Aspergillus Fumigatus Other (See Comments), Hives and Swelling    Azathioprine Other (See Comments) and Anaphylaxis     pancreatitis  According to patient she needed Epipen    Sumatriptan Anaphylaxis     Bradycardia, sob, back pressure, head pain,throat tightness  According to the patient she had anphylaxis    Contrast [Iodinated Diagnostic Agents]      Pt states needs to be premedicated prior to injection    Corn Dextrin      Any corn based products    Corn Oil Hives    Gelatin     Histamine      Intolerance      Ibuprofen Hives and Throat Swelling    Malto Dextrin [Dextrin]     Other      Gel caps, maltodextrose, dextrose    Penicillium Notatum     Sulfa Antibiotics Hives and Other (See Comments)    Sulfate     Sulfites      Social History   reports that she has never smoked  She has never used smokeless tobacco  She reports that she has current or past drug history  She reports that she does not drink alcohol    Family History  Family History   Problem Relation Age of Onset    Ulcerative colitis Mother     Heart failure Father     Neuropathy Father     Macular degeneration Father     Diabetes Father     Asthma Daughter     Hypothyroidism Daughter     Heart disease Maternal Grandmother     Arthritis Maternal Grandmother     Arthritis Paternal Grandmother     Macular degeneration Paternal Grandmother     Lymphoma Paternal Grandfather     Heart failure Paternal Aunt     Heart failure Paternal Aunt     Breast cancer Paternal Aunt 47    Breast cancer additional onset Paternal Aunt 62    Hypothyroidism Paternal Aunt     Breast cancer Maternal Aunt     No Known Problems Maternal Grandfather      ROS  ROS: Reports:  Nausea, loose stools, dysphagia  Denies shortness of breath, dizziness  All others negative except as noted in HPI  Objective   Vitals  /57 (BP Location: Right arm)   Pulse 75   Temp 97 9 °F (36 6 °C) (Tympanic)   Resp 15   Wt 59 7 kg (131 lb 9 8 oz)   SpO2 97%   BMI 21 90 kg/m²   General: Alert, no apparent distress  Eyes:  No scleral icterus  ENT:  Mucous membranes moist  Card:  Regular rhythm  Lungs: Clear to ascultation b/l  No wheezes, rales, rhonchi  Abdomen:  Soft  Nondistended  Bowel sounds normoactive  Minimally tender in the lower quadrants without rebound or guarding  Skin:  No jaundice  Extremities:  No edema  Neuro: Alert and oriented x3    Laboratory Studies  Lab Results   Component Value Date    WBC 12 26 (H) 09/13/2019    HGB 14 1 09/13/2019    HCT 42 9 09/13/2019     09/13/2019    MCV 97 09/13/2019     Lab Results   Component Value Date    CREATININE 0 69 09/13/2019    BUN 9 09/13/2019    SODIUM 137 09/13/2019    K 4 4 09/13/2019     09/13/2019    CO2 29 09/13/2019    GLUC 142 (H) 09/13/2019    CALCIUM 8 5 09/13/2019    ALKPHOS 47 09/13/2019    ALB 3 0 (L) 09/13/2019    TBILI 0 29 09/13/2019    AST 32 09/13/2019    ALT 90 (H) 09/13/2019     Lab Results   Component Value Date    PROTIME 12 2 08/26/2019    INR 0 91 08/26/2019       Imaging and Other Studies  Video swallow with speech in progress    Assessment and Plan:  1  Left-sided ulcerative colitis    Previously on Remicade but she had a reaction at her last dose in mid August   Plan is for her to follow up with Dr Darius Medel  Would DC Relistor and make MiraLax p r n  Continue Solu-Medrol for now, will discuss transitioning to oral prednisone  2  Sphincter of Oddi syndrome, stable  Patient states she does not take narcotics for this at home  3  Dysphagia with self-reported history of eosinophilic esophagitis  Biopsies negative on recent EGD  Video swallow in progress      Principal Problem:    Severe sepsis ([facility] [address] )  Active Problems:    Exacerbation of ulcerative colitis with rectal bleeding (HCC)    Vitamin D deficiency    Hypothyroidism    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HCC)    Immunosuppressed status ([facility] [address] )    Bacteremia due to Gram-negative bacteria    Mass of left axilla    Mild protein-calorie malnutrition (HCC)    Odynophagia    Dysphagia      Brittany Woodruff PA-C

## 2019-09-13 NOTE — ASSESSMENT & PLAN NOTE
Due to suspicion of mass in left axilla, left axillary ultrasound was obtained during her last admission at [address]  Left axillary ultrasound showed-no fluid collections or suspicious masses detected  Left axillary lymph nodes, 1 of which is mildly prominent in size, however all visualized lymph nodes demonstrate normal intrinsic morphology  This is of doubtful clinical significance

## 2019-09-13 NOTE — ASSESSMENT & PLAN NOTE
Patient has had progressively worsening odynophagia secondary to a tonsillectomy performed in April 2019  She is having difficulty with oral food and fluid intake  She was offered an ENT evaluation as an outpatient but is requesting inpatient ENT evaluation  For this reason, she was transferred to [facility] because [address] [place] [address] does not have access to ENT consult services  ENT consult has been placed  The patient will also need a video barium swallow test performed

## 2019-09-13 NOTE — PHYSICAL THERAPY NOTE
Physical Therapy Evaluation/Treatment    Patient Name: Eliz Salas    ASARG'G Date: 9/13/2019     Time In: 9:28  Time Out: 9:45     Problem List  Principal Problem:    Severe sepsis [facility])  Active Problems:    Exacerbation of ulcerative colitis with rectal bleeding ([facility] [address] )    Vitamin D deficiency    Hypothyroidism    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[facility] [address] )    Immunosuppressed status ([facility] )    Bacteremia due to Gram-negative bacteria    Mass of left axilla    Mild protein-calorie malnutrition ([facility] [address] )    Odynophagia    Dysphagia       Past Medical History  Past Medical History:   Diagnosis Date    Anxiety     Asthma     Chronic kidney disease     Deep vein thrombosis [facility]     Disease of thyroid gland     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    Generalized anxiety disorder 8/26/2017    Heart murmur     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[facility]     Migraine     Myocardial infarction ([facility] )     NSTEMI  pt denies, documented in cardio note    Pancreatitis     sphinger of     Psychiatric disorder     RA [facility]rheumatoid arthritis) ([facility] )     Ulcerative colitis ([facility] )         Past Surgical History  Past Surgical History:   Procedure Laterality Date    APPENDECTOMY      CHOLECYSTECTOMY      EGD AND COLONOSCOPY N/A 9/12/2017    Procedure: EGD AND COLONOSCOPY;  Surgeon: Maria Del Carmen Irvin MD;  [facility]; Service: Gastroenterology    ERCP N/A 9/15/2017    Procedure: ENDOSCOPIC RETROGRADE CHOLANGIOPANCREATOGRAPHY (ERCP); Surgeon: Tigre Thao MD;  Location: [facility];   Service: Gastroenterology    HYSTERECTOMY      KNEE SURGERY Right     LAPAROSCOPIC ENDOMETRIOSIS FULGURATION      ORIF ANKLE FRACTURE Left     [place] KNEE SCOPE,MED/LAT MENISECTOMY Left 5/12/2017    Procedure: POSSIBLE MEDIAL MENISECTOMY;  Surgeon: Que Mcgraw MD;  [facility];  Service: Orthopedics    [place] KNEE [address] CART Left 5/12/2017    Procedure: KNEE ARTHROSCOPY EVALUATION, CHONDROPLASTY;  Surgeon: Debra Fairbanks MD;  [facility];  Service: Orthopedics    [place] LAP,DIAGNOSTIC ABDOMEN N/A 12/12/2017    Procedure: LAPAROSCOPY DIAGNOSTIC  WITH LYSIS OF ADHESIONS;  Surgeon: Marlen Roth DO;  Location:  MAIN OR;  Service: General           09/13/19 1000   Note Type   Note type Eval/Treat   Pain Assessment   Pain Assessment 0-10   Pain Score 8   Pain Type Chronic pain   Pain Location Abdomen   Pain Orientation Right;Upper   Hospital Pain Intervention(s) Repositioned; Ambulation/increased activity   Response to Interventions tolerated   Home Living   Type of Home House  (ranch style)   Home Layout One level; Laundry in basement;Able to live on main level with bedroom/bathroom;Stairs to enter without rails  (7 vs 3 VIMAL front/back, 13 stairs to basement)   Bathroom Shower/Tub Tub/shower unit   Bathroom Toilet Standard   Home Equipment Other (Comment)  (pt reports no DME use PTA)   Prior Function   Level of [place] Independent with ADLs and functional mobility; Needs assistance with IADLs   Lives With Family  (, two sons)   Receives Help From Family   ADL Assistance Independent   IADLs Needs assistance   Falls in the last 6 months 0   Comments pt reports  works, one son in high school and other son works night shift so someone is usually home with her   Restrictions/Precautions   Weight Bearing Precautions Per Order No   Other Precautions Multiple lines; Fall Risk;Pain   General   Family/Caregiver Present No   Cognition   Overall Cognitive Status WFL   Arousal/Participation Cooperative   Orientation Level Oriented X4   Following Commands Follows all commands and directions without difficulty   RUE Assessment   RUE Assessment WFL   LUE Assessment   LUE Assessment WFL   RLE Assessment   RLE Assessment WFL   LLE Assessment   LLE Assessment WFL   Coordination   Movements are Fluid and Coordinated 0   Coordination and Movement Description slow movements, guarded posture, LLE drop foot   Bed Mobility   Supine to Sit 7  Independent   Transfers   Sit to Stand 6  Modified independent   Additional items Increased time required   Stand to Sit 6  Modified independent   Additional items Increased time required   Ambulation/Elevation   Gait pattern Antalgic;Narrow MIGUEL; Decreased foot clearance; Short stride; Step to;Excessively slow  (LLE foot drop)   Gait Assistance 6  Modified independent   Additional items Other (Comment)  (increased time required)   Assistive Device None   Distance 10' x 2  (pt declined ambulation out of the room 2* to autoimmune cond)   Stair Management Assistance Not tested  (pt declining to leave the room 2* to autoimmune conditions)   Balance   Static Sitting Good   Dynamic Sitting Good   Static Standing Fair +   Dynamic Standing Fair   Ambulatory Fair   Endurance Deficit   Endurance Deficit Yes   Endurance Deficit Description fatigue   Activity Tolerance   Activity Tolerance Patient limited by fatigue   Medical Staff [address]   Nurse Made Aware RN cleared patient for PT evaluation   Assessment   Prognosis Fair   Problem List Decreased endurance; Impaired balance;Decreased mobility; Decreased coordination; Impaired sensation;Pain   Assessment Pt is a 53 y o  female seen for PT evaluation s/p admit to [facility] [place] on 9/12/19  Two pt identifiers were used to confirm  Pt presented with severe sepsis on 9/12/19  Pt was admitted with a primary dx of: severe sepsis  PT now consulted for assessment of mobility and d/c needs  Pts current co morbidities effecting treatment include: ulcerative colitis, hypothyroidism, mast cell activation syndrome, anxiety, dysphagia and personal factors including 3 vs 7 VIMAL home environment  Pt currently lives in a ranch style home with her  and two sons   Pts current clinical presentation is Unstable/ Unpredictable (high complexity) due to Ongoing medical management for primary dx, decreased activity tolerance compared to baseline, fall risk, multiple lines, decline in overall functional mobility status  Prior to admission, pt was I with ambulation without the use of an AD as per pt  Upon evaluation, pt currently is independent for bed mobility; mod I for transfers and mod I for ambulation without an AD  Pt displays decreased step and stride length, inconsistent winnie, decreased gait speed, decreased foot clearance, LLE foot drop  Pt presents at PT eval functioning below baseline and currently w/ overall mobility deficits secondary to: decreased endurance, impaired balance, impaired coordination, decreased mobility, pain  Pt currently at a fall risk secondary to impairments listed above  Based on PT evaluation, pt will continue to benefit from skilled acute PT interventions to address stated impairments; to maximize functional mobility; for ongoing pt/ family training; and DME needs  At conclusion of PT session pt was left standing in room using IV for support, agreeable to participate in follow up PT session  Provided pt education regarding PT plan to improve functional mobility status  PT is currently recommending home with family support when medically stable  Pt agreeable to plan and goals as stated on evaluation  PT will continue to follow during hospital stay  Goals   Patient Goals feel better   [facility] Expiration Date 09/14/19   Short Term Goal #1 1  Pt will be independent with HEP  2  Pt will be agreeable to participate in multiple ambulation trials while admitted to the hospital  3  Pt will trial stair negotiation  Treatment Day 0   Plan   Treatment/Interventions Functional transfer training;Elevations; Therapeutic exercise; Endurance training;Gait training;Spoke to nursing;OT   PT Frequency Follow-up visit only   Recommendation   Recommendation Home with family support   PT - OK to Discharge Yes   Additional Comments when medically stable   Modified Kasi Scale   Modified Kasi Scale 3   Barthel Index Feeding 10   Bathing 0   Grooming Score 5   Dressing Score 10   Bladder Score 10   Bowels Score 10   Toilet Use Score 10   Transfers (Bed/Chair) Score 15   Mobility (Level Surface) Score 0   Stairs Score 0   Barthel Index Score 70       Additional Therapy Session:    Time In: 9:45  Time Out: 10:00    Pt agreeable to participate in therapy session focusing on functional mobility tasks and education  Pt tolerated standing unsupported for 10 minutes with supervision assist  Provided pt extensive education regarding benefits of increasing activity tolerance and ambulating household and community distances while admitted to the hospital to maintain endurance  Pt continually declining exiting the room to increase ambulation distance or participate in stair negotiation trial secondary to significant anxiety regarding her autoimmune conditions  Instructed pt to frequently ambulate in room to maintain activity tolerance while admitted to hospital and ambulate with nursing staff in hallway if she changes her mind  Recommend patient discharge home with family support when medically stable      Ernestina Rust PT, DPT

## 2019-09-13 NOTE — ASSESSMENT & PLAN NOTE
Due to suspicion of mass in left axilla, left axillary ultrasound was obtained during her last admission at [address]  Left axillary ultrasound showed-no fluid collections or suspicious masses detected  Left axillary lymph nodes, 1 of which is mildly prominent in size, however all visualized lymph nodes demonstrate normal intrinsic morphology  This is of doubtful clinical significance

## 2019-09-13 NOTE — UTILIZATION REVIEW
Initial Clinical Review    Admission: Date/Time/Statement: Inpatient Admission Orders (From admission, onward)     Ordered        09/12/19 1921  Inpatient Admission  Once                   Orders Placed This Encounter   Procedures    Inpatient Admission     Standing Status:   Standing     Number of Occurrences:   1     Order Specific Question:   Admitting Physician     Answer:   Noble Sykes     Order Specific Question:   Level of Care     Answer:   Med Surg [16]     Order Specific Question:   Estimated length of stay     Answer:   More than 2 Midnights     Order Specific Question:   Certification     Answer:   I certify that inpatient services are medically necessary for this patient for a duration of greater than two midnights  See H&P and MD Progress Notes for additional information about the patient's course of treatment  No chief complaint on file  Assessment/Plan:     53  Y O  Female  Transferred  From [facility]  Pt  Was  Initially  Admitted  To [facility]  On  8/26/2019 with severe  Sepsis, UC  And   Severe proctitis  Pt is   Demanding  Daily  Specialist  Rounds unavailable at   [facility]  and transferred  To [facility] for services  ADMIT  IP  MED SURG  LOC  With   Severe  Sepsis,   GNB, dysphagia  And  Exac     UC  With rectal bleeding and plan is  Monitor  Labs,  ENT  Consult,  ID  Consult  And  Ba  Swallow  Per  ENT  Consult  Dysphagia with a history of aspiration  2  Dysphonia    Plan:  She has had history of dysphagia since her tonsillectomy in April  This was initially worked up in May at [facility] where she had a modified swallow study with speech therapy  Although I do not have a copy of the report this point time she relates a history of aspiration with multiple substances    Since there has been passage of 3 months since that last test I would recommend a repeat modified swallow study with speech to determine if she is still having aspiration  The dysphagia is most likely not related to her past history of tonsillectomy  Initial dysphagia is always expected but long-term dysphagia with aspiration is completely unrelated to the tonsillectomy  The patient was worried that she may have Candida in the laryngeal region  This was determined not to be the case  There is no evidence of either bacterial or fungal infection at this time  She has a history of getting IV infusion therapy for her chronic dehydration  This chronic dehydration is the most likely etiology of her dysphonia  Vocal cord evaluation is normal without any nodules, polyps or growths  With extended periods of phonation she states that her voice becomes more hoarse and she developed some slight pain    Increased hydration is the only therapy that is required for this          ED Triage Vitals   Temperature Pulse Respirations Blood Pressure SpO2   09/12/19 1929 09/12/19 1803 09/12/19 1803 09/12/19 1803 09/12/19 1803   97 9 °F (36 6 °C) 75 18 118/81 97 %      Temp Source Heart Rate Source Patient Position - Orthostatic VS BP Location FiO2 (%)   09/12/19 1929 09/12/19 1803 09/12/19 1803 09/12/19 1803 --   Tympanic Monitor Lying Right arm       Pain Score       09/12/19 2139       8        Wt Readings from Last 1 Encounters:   09/13/19 59 7 kg (131 lb 9 8 oz)     Additional Vital Signs:   09/13/19 0720  97 9 °F (36 6 °C)  75  15  110/57  97 %  None (Room air)  Lying   09/12/19 2300  97 9 °F (36 6 °C)  65  20  110/67  97 %  None (Room air)  Lying   09/12/19 1929  97 9 °F (36 6 °C)  59  16  121/89  98 %  None (Room air)  Lying   09/12/19 1803  --  75  18  118/81  97 %  None (Room air)  Lying         Pertinent Labs/Diagnostic Test Results:   Results from last 7 days   Lab Units 09/13/19  0511 09/12/19  0540 09/11/19  0412 09/10/19  0532 09/09/19  0632   WBC Thousand/uL 12 26* 16 64* 13 26* 16 27* 15 31*   HEMOGLOBIN g/dL 14 1 14 6 14 0 14 7 14 4   HEMATOCRIT % 42 9 43 5 42 2 43 6 42 4   PLATELETS Thousands/uL 376 388 384 303 381   NEUTROS ABS Thousands/µL 10 90* 13 92* 11 72* 14 07* 13 29*         Results from last 7 days   Lab Units 09/13/19  0511 09/12/19  0540 09/11/19  0413 09/10/19  0532 09/09/19  0632 09/09/19  0631   SODIUM mmol/L 137 136 138 136 137  --    POTASSIUM mmol/L 4 4 4 2 4 0 4 0 3 8  --    CHLORIDE mmol/L 101 100 101 102 102  --    CO2 mmol/L 29 29 28 27 28  --    ANION GAP mmol/L 7 7 9 7 7  --    BUN mg/dL 9 11 13 12 13  --    CREATININE mg/dL 0 69 0 73 0 80 0 71 0 67  --    EGFR ml/min/1 73sq m 105 99 89 102 106  --    CALCIUM mg/dL 8 5 8 6 8 4 8 7 8 7  --    MAGNESIUM mg/dL 2 2 2 1 2 0 2 4  --  2 1   PHOSPHORUS mg/dL 3 9 4 1 3 6 4 0  --  4 2     Results from last 7 days   Lab Units 09/13/19  0511 09/12/19  0540 09/11/19  0413 09/10/19  0532 09/09/19  0632   AST U/L 32 16 19 18 15   ALT U/L 90* 62 65 61 55   ALK PHOS U/L 47 44* 44* 46 49   TOTAL PROTEIN g/dL 6 4 6 5 6 2* 6 5 6 4   ALBUMIN g/dL 3 0* 3 2* 3 0* 3 2* 3 1*   TOTAL BILIRUBIN mg/dL 0 29 0 40 0 30 0 30 0 30         Results from last 7 days   Lab Units 09/13/19  0511 09/12/19  0540 09/11/19  0413 09/10/19  0532 09/09/19  0632 09/08/19  0604 09/07/19  0556   GLUCOSE RANDOM mg/dL 142* 124 153* 133 131 123 107           Results from last 7 days   Lab Units 09/12/19  0540 09/11/19  0409 09/07/19  0556   PROCALCITONIN ng/ml <0 05 <0 05 <0 05           Results from last 7 days   Lab Units 09/11/19  0413 09/07/19  0556   LIPASE u/L  --  102   AMYLASE IU/L 72  --            Results from last 7 days   Lab Units 09/11/19  0424 09/11/19  0423   BLOOD CULTURE  No Growth at 24 hrs  No Growth at 24 hrs           Present on Admission:   Severe sepsis ([address] )   Bacteremia due to Gram-negative bacteria   Dysphagia   Odynophagia   Exacerbation of ulcerative colitis with rectal bleeding (HCC)   Hypothyroidism   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HCC)   Mass of left axilla   Vitamin D deficiency   Mild protein-calorie malnutrition ([facility] )   Immunosuppressed status ([place] [id number] )      Admitting Diagnosis: Severe sepsis ([place] [id number] ) [A41 9, R65 20]  Age/Sex: 53 y o  female  Admission Orders:    Current Facility-Administered Medications:  acetaminophen 650 mg Oral Q6H PRN   butalbital-acetaminophen-caffeine 1 tablet Oral Q8H PRN   cetirizine 20 mg Oral BID   diphenhydrAMINE 50 mg Intravenous Q4H PRN   enoxaparin 40 mg Subcutaneous Q24H   HYDROmorphone 0 2 mg Intravenous Q4H PRN   Or      HYDROmorphone 0 5 mg Intravenous Q4H PRN   levothyroxine 50 mcg Oral Early Morning   lidocaine 2 patch Topical Daily   LORazepam 1 mg Oral Q8H PRN   methylnaltrexone bromide 8 mg Subcutaneous Daily   methylPREDNISolone sodium succinate 40 mg Intravenous Q8H [address]   metoclopramide 5 mg Intravenous 4x Daily (AC & HS)   montelukast 10 mg Oral BID   nystatin 500,000 Units Swish & Spit 4x Daily   ondansetron 4 mg Intravenous Q6H PRN   pantoprazole 40 mg Oral BID AC   polyethylene glycol 17 g Oral BID   ranitidine 300 mg Oral BID AC   saccharomyces boulardii 250 mg Oral BID   saliva substitute 5 spray Mouth/Throat TID   simethicone 80 mg Oral Q6H PRN   sodium chloride 75 mL/hr Intravenous Continuous   temazepam 30 mg Oral HS       IP CONSULT TO CASE MANAGEMENT  IP CONSULT TO ENT  IP CONSULT TO GASTROENTEROLOGY    Network Utilization Review Department  Phone: [phone]; Fax [phone]  [email or web address]  ATTENTION: Please call with any questions or concerns to [phone]  and carefully listen to the prompts so that you are directed to the right person  Send all requests for admission clinical reviews, approved or denied determinations and any other requests to fax [phone]   All voicemails are confidential

## 2019-09-13 NOTE — ASSESSMENT & PLAN NOTE
Patient was at [facility] with severe sepsis that was present on admission there, due to gram-negative bacteremia  She was seen in consultation by Infectious Disease and was initially treated with IV cefepime  She was then transitioned to PO Levaquin 750 mg daily on 08/31 and she completed a 14 day course of antibiotic therapy  Currently severe sepsis is resolved with resolution of lactic acidosis  The patient is also requesting Infectious Disease consultation but unsure what diagnosis to tell Infectious Diseases in order to complete consult

## 2019-09-14 LAB
ANION GAP SERPL CALCULATED.3IONS-SCNC: 7 MMOL/L (ref 4–13)
BASOPHILS # BLD AUTO: 0.01 THOUSANDS/ΜL (ref 0–0.1)
BASOPHILS NFR BLD AUTO: 0 % (ref 0–1)
BUN SERPL-MCNC: 10 MG/DL (ref 5–25)
CALCIUM SERPL-MCNC: 8.5 MG/DL (ref 8.3–10.1)
CHLORIDE SERPL-SCNC: 102 MMOL/L (ref 100–108)
CO2 SERPL-SCNC: 28 MMOL/L (ref 21–32)
CREAT SERPL-MCNC: 0.66 MG/DL (ref 0.6–1.3)
EOSINOPHIL # BLD AUTO: 0.01 THOUSAND/ΜL (ref 0–0.61)
EOSINOPHIL NFR BLD AUTO: 0 % (ref 0–6)
ERYTHROCYTE [DISTWIDTH] IN BLOOD BY AUTOMATED COUNT: 13.2 % (ref 11.6–15.1)
GFR SERPL CREATININE-BSD FRML MDRD: 106 ML/MIN/1.73SQ M
GLUCOSE SERPL-MCNC: 113 MG/DL (ref 65–140)
HCT VFR BLD AUTO: 40.7 % (ref 34.8–46.1)
HGB BLD-MCNC: 13.1 G/DL (ref 11.5–15.4)
IMM GRANULOCYTES # BLD AUTO: 0.19 THOUSAND/UL (ref 0–0.2)
IMM GRANULOCYTES NFR BLD AUTO: 1 % (ref 0–2)
LYMPHOCYTES # BLD AUTO: 1.55 THOUSANDS/ΜL (ref 0.6–4.47)
LYMPHOCYTES NFR BLD AUTO: 9 % (ref 14–44)
MCH RBC QN AUTO: 31.6 PG (ref 26.8–34.3)
MCHC RBC AUTO-ENTMCNC: 32.2 G/DL (ref 31.4–37.4)
MCV RBC AUTO: 98 FL (ref 82–98)
MONOCYTES # BLD AUTO: 0.86 THOUSAND/ΜL (ref 0.17–1.22)
MONOCYTES NFR BLD AUTO: 5 % (ref 4–12)
NEUTROPHILS # BLD AUTO: 13.95 THOUSANDS/ΜL (ref 1.85–7.62)
NEUTS SEG NFR BLD AUTO: 85 % (ref 43–75)
NRBC BLD AUTO-RTO: 0 /100 WBCS
PLATELET # BLD AUTO: 382 THOUSANDS/UL (ref 149–390)
PMV BLD AUTO: 8.9 FL (ref 8.9–12.7)
POTASSIUM SERPL-SCNC: 4.2 MMOL/L (ref 3.5–5.3)
RBC # BLD AUTO: 4.15 MILLION/UL (ref 3.81–5.12)
SODIUM SERPL-SCNC: 137 MMOL/L (ref 136–145)
WBC # BLD AUTO: 16.57 THOUSAND/UL (ref 4.31–10.16)

## 2019-09-14 PROCEDURE — 85025 COMPLETE CBC W/AUTO DIFF WBC: CPT | Performed by: INTERNAL MEDICINE

## 2019-09-14 PROCEDURE — 80048 BASIC METABOLIC PNL TOTAL CA: CPT | Performed by: INTERNAL MEDICINE

## 2019-09-14 PROCEDURE — 99232 SBSQ HOSP IP/OBS MODERATE 35: CPT | Performed by: PHYSICIAN ASSISTANT

## 2019-09-14 PROCEDURE — 99232 SBSQ HOSP IP/OBS MODERATE 35: CPT | Performed by: INTERNAL MEDICINE

## 2019-09-14 RX ORDER — HYDROMORPHONE HCL/PF 1 MG/ML
0.5 SYRINGE (ML) INJECTION EVERY 6 HOURS PRN
Status: DISCONTINUED | OUTPATIENT
Start: 2019-09-14 | End: 2019-09-15

## 2019-09-14 RX ORDER — TRAMADOL HYDROCHLORIDE 50 MG/1
50 TABLET ORAL EVERY 4 HOURS PRN
Status: DISCONTINUED | OUTPATIENT
Start: 2019-09-14 | End: 2019-09-17 | Stop reason: HOSPADM

## 2019-09-14 RX ORDER — DIPHENHYDRAMINE HYDROCHLORIDE 50 MG/ML
50 INJECTION INTRAMUSCULAR; INTRAVENOUS EVERY 6 HOURS PRN
Status: DISCONTINUED | OUTPATIENT
Start: 2019-09-14 | End: 2019-09-15

## 2019-09-14 RX ORDER — DIPHENHYDRAMINE HYDROCHLORIDE 50 MG/ML
25 INJECTION INTRAMUSCULAR; INTRAVENOUS EVERY 6 HOURS PRN
Status: DISCONTINUED | OUTPATIENT
Start: 2019-09-14 | End: 2019-09-14

## 2019-09-14 RX ORDER — TRAMADOL HYDROCHLORIDE 50 MG/1
50 TABLET ORAL EVERY 6 HOURS PRN
Status: DISCONTINUED | OUTPATIENT
Start: 2019-09-14 | End: 2019-09-14

## 2019-09-14 RX ADMIN — ONDANSETRON 4 MG: 2 INJECTION INTRAMUSCULAR; INTRAVENOUS at 19:45

## 2019-09-14 RX ADMIN — ENOXAPARIN SODIUM 40 MG: 40 INJECTION SUBCUTANEOUS at 10:26

## 2019-09-14 RX ADMIN — DIPHENHYDRAMINE HYDROCHLORIDE 50 MG: 50 INJECTION, SOLUTION INTRAMUSCULAR; INTRAVENOUS at 10:26

## 2019-09-14 RX ADMIN — LORAZEPAM 1 MG: 1 TABLET ORAL at 12:17

## 2019-09-14 RX ADMIN — Medication 250 MG: at 17:16

## 2019-09-14 RX ADMIN — MONTELUKAST SODIUM 10 MG: 10 TABLET, COATED ORAL at 12:10

## 2019-09-14 RX ADMIN — DIPHENHYDRAMINE HYDROCHLORIDE 25 MG: 50 INJECTION, SOLUTION INTRAMUSCULAR; INTRAVENOUS at 14:07

## 2019-09-14 RX ADMIN — Medication 5 SPRAY: at 10:27

## 2019-09-14 RX ADMIN — HYDROMORPHONE HYDROCHLORIDE 0.5 MG: 1 INJECTION, SOLUTION INTRAMUSCULAR; INTRAVENOUS; SUBCUTANEOUS at 00:03

## 2019-09-14 RX ADMIN — SODIUM CHLORIDE 75 ML/HR: 0.9 INJECTION, SOLUTION INTRAVENOUS at 12:11

## 2019-09-14 RX ADMIN — CETIRIZINE HYDROCHLORIDE 20 MG: 10 TABLET, FILM COATED ORAL at 18:10

## 2019-09-14 RX ADMIN — METOCLOPRAMIDE 5 MG: 5 INJECTION, SOLUTION INTRAMUSCULAR; INTRAVENOUS at 17:00

## 2019-09-14 RX ADMIN — HYDROMORPHONE HYDROCHLORIDE 0.5 MG: 1 INJECTION, SOLUTION INTRAMUSCULAR; INTRAVENOUS; SUBCUTANEOUS at 04:03

## 2019-09-14 RX ADMIN — LIDOCAINE 2 PATCH: 50 PATCH TOPICAL at 10:25

## 2019-09-14 RX ADMIN — DIPHENHYDRAMINE HYDROCHLORIDE 50 MG: 50 INJECTION, SOLUTION INTRAMUSCULAR; INTRAVENOUS at 20:36

## 2019-09-14 RX ADMIN — TRAMADOL HYDROCHLORIDE 50 MG: 50 TABLET, FILM COATED ORAL at 14:07

## 2019-09-14 RX ADMIN — HYDROMORPHONE HYDROCHLORIDE 0.5 MG: 1 INJECTION, SOLUTION INTRAMUSCULAR; INTRAVENOUS; SUBCUTANEOUS at 10:28

## 2019-09-14 RX ADMIN — PANTOPRAZOLE SODIUM 40 MG: 40 TABLET, DELAYED RELEASE ORAL at 17:17

## 2019-09-14 RX ADMIN — LORAZEPAM 1 MG: 1 TABLET ORAL at 02:04

## 2019-09-14 RX ADMIN — PANTOPRAZOLE SODIUM 40 MG: 40 TABLET, DELAYED RELEASE ORAL at 12:10

## 2019-09-14 RX ADMIN — DIPHENHYDRAMINE HYDROCHLORIDE 50 MG: 50 INJECTION, SOLUTION INTRAMUSCULAR; INTRAVENOUS at 02:04

## 2019-09-14 RX ADMIN — NYSTATIN 500000 UNITS: 100000 SUSPENSION ORAL at 12:10

## 2019-09-14 RX ADMIN — HYDROMORPHONE HYDROCHLORIDE 0.5 MG: 1 INJECTION, SOLUTION INTRAMUSCULAR; INTRAVENOUS; SUBCUTANEOUS at 20:39

## 2019-09-14 RX ADMIN — POLYETHYLENE GLYCOL 3350 17 G: 17 POWDER, FOR SOLUTION ORAL at 17:17

## 2019-09-14 RX ADMIN — RANITIDINE 300 MG: 150 TABLET ORAL at 17:17

## 2019-09-14 RX ADMIN — NYSTATIN 500000 UNITS: 100000 SUSPENSION ORAL at 10:26

## 2019-09-14 RX ADMIN — Medication 5 SPRAY: at 17:00

## 2019-09-14 RX ADMIN — POLYETHYLENE GLYCOL 3350 17 G: 17 POWDER, FOR SOLUTION ORAL at 10:27

## 2019-09-14 RX ADMIN — METOCLOPRAMIDE 5 MG: 5 INJECTION, SOLUTION INTRAMUSCULAR; INTRAVENOUS at 06:00

## 2019-09-14 RX ADMIN — LORAZEPAM 1 MG: 1 TABLET ORAL at 19:21

## 2019-09-14 RX ADMIN — METOCLOPRAMIDE 5 MG: 5 INJECTION, SOLUTION INTRAMUSCULAR; INTRAVENOUS at 12:10

## 2019-09-14 RX ADMIN — CETIRIZINE HYDROCHLORIDE 20 MG: 10 TABLET, FILM COATED ORAL at 12:09

## 2019-09-14 RX ADMIN — DIPHENHYDRAMINE HYDROCHLORIDE 50 MG: 50 INJECTION, SOLUTION INTRAMUSCULAR; INTRAVENOUS at 06:09

## 2019-09-14 RX ADMIN — METHYLPREDNISOLONE SODIUM SUCCINATE 20 MG: 40 INJECTION, POWDER, FOR SOLUTION INTRAMUSCULAR; INTRAVENOUS at 14:08

## 2019-09-14 RX ADMIN — LEVOTHYROXINE SODIUM 50 MCG: 50 TABLET ORAL at 06:00

## 2019-09-14 RX ADMIN — Medication 250 MG: at 10:26

## 2019-09-14 RX ADMIN — NYSTATIN 500000 UNITS: 100000 SUSPENSION ORAL at 17:17

## 2019-09-14 RX ADMIN — METHYLPREDNISOLONE SODIUM SUCCINATE 20 MG: 40 INJECTION, POWDER, FOR SOLUTION INTRAMUSCULAR; INTRAVENOUS at 06:00

## 2019-09-14 RX ADMIN — RANITIDINE 300 MG: 150 TABLET ORAL at 12:09

## 2019-09-14 RX ADMIN — DIPHENHYDRAMINE HYDROCHLORIDE 50 MG: 50 INJECTION, SOLUTION INTRAMUSCULAR; INTRAVENOUS at 14:46

## 2019-09-14 NOTE — PROGRESS NOTES
Progress Note - Zay Mao 53 y o  female MRN: [id number]    Unit/Bed#: E2 -01 Encounter: [id number]    Subjective:     Patient seen and examined at bedside  She reports feeling better on the IV steroids  Her last bowel movement was yesterday morning  There was scant amount of blood in the stool  Objective:     Vitals: Blood pressure 111/72, pulse 68, temperature 99 3 °F (37 4 °C), temperature source Tympanic, resp  rate 16, weight 60 1 kg (132 lb 7 9 oz), SpO2 97 %, not currently breastfeeding  ,Body mass index is 22 05 kg/m²  Intake/Output Summary (Last 24 hours) at 9/14/2019 1319  Last data filed at 9/14/2019 1211  Gross per 24 hour   Intake 1821 25 ml   Output --   Net 1821 25 ml       Physical Exam:     General Appearance: Alert, appears stated age and cooperative  Lungs: Clear to auscultation bilaterally  Heart: Regular rate and rhythm  Abdomen: Soft, non-tender, non-distended; bowel sounds normal  Extremities: No cyanosis, edema    Invasive Devices     Peripheral Intravenous Line            Peripheral IV 09/13/19 Right;Ventral (anterior) Forearm 1 day                Lab Results:  Results from last 7 days   Lab Units 09/14/19  0607   WBC Thousand/uL 16 57*   HEMOGLOBIN g/dL 13 1   HEMATOCRIT % 40 7   PLATELETS Thousands/uL 382   NEUTROS PCT % 85*   LYMPHS PCT % 9*   MONOS PCT % 5   EOS PCT % 0     Results from last 7 days   Lab Units 09/14/19  0607 09/13/19  0511   POTASSIUM mmol/L 4 2 4 4   CHLORIDE mmol/L 102 101   CO2 mmol/L 28 29   BUN mg/dL 10 9   CREATININE mg/dL 0 66 0 69   CALCIUM mg/dL 8 5 8 5   ALK PHOS U/L  --  47   ALT U/L  --  90*   AST U/L  --  32         Results from last 7 days   Lab Units 09/11/19  0413   AMYLASE IU/L 72       Imaging Studies: I have personally reviewed pertinent imaging studies  No results found  Assessment and Plan:    1) Ulcerative colitis: She sees IBD specialists at [facility]   She has been on Remicade for past 18 months, and recently had increase in frequency of dosing from every 8 weeks to every 6 weeks  Unfortunately, during her last infusion in mid August, she suffered a potential allergic reaction  Recent colonoscopy from 8/30 revealed moderately severe proctosigmoiditis  Biopsies showed acute and chronic colitis with evidence of active inflammatory bowel disease  Fortunately, her symptoms have been improving with IV steroids  She lives in the [facility] and would prefer to find a local IBD specialist     -Continue IV Solu-Medrol 20 mg every 8 hours  -Plan to transition to prednisone 40 mg daily   -Continue MiraLAX daily for constipation  -Continue diet as tolerated  -I recommend follow-up with either Dr Tyron Harris or Dr Minoo Holder at the [facility] to discuss alternative biologic therapy  -Continue Lovenox for DVT prophylaxis    2) Dysphagia: There is self-reported history of eosinophilic esophagitis, however recent EGD biopsies from 8/30 were negative    Speech pathology perform video barium swallow which was normal       -Continue drinking plenty of water to help swallow solid food  -She would benefit from evaluation for Sjogren's disease

## 2019-09-14 NOTE — PLAN OF CARE
Problem: PAIN - ADULT  Goal: Verbalizes/displays adequate comfort level or baseline comfort level  Description  Interventions:  - Encourage patient to monitor pain and request assistance  - Assess pain using appropriate pain scale  - Administer analgesics based on type and severity of pain and evaluate response  - Implement non-pharmacological measures as appropriate and evaluate response  - Consider cultural and social influences on pain and pain management  - Notify physician/advanced practitioner if interventions unsuccessful or patient reports new pain  Outcome: Progressing     Problem: INFECTION - ADULT  Goal: Absence or prevention of progression during hospitalization  Description  INTERVENTIONS:  - Assess and monitor for signs and symptoms of infection  - Monitor lab/diagnostic results  - Monitor all insertion sites, i e  indwelling lines, tubes, and drains  - Monitor endotracheal if appropriate and nasal secretions for changes in amount and color  - [place] appropriate cooling/warming therapies per order  - Administer medications as ordered  - Instruct and encourage patient and family to use good hand hygiene technique  - Identify and instruct in appropriate isolation precautions for identified infection/condition  Outcome: Progressing  Goal: Absence of fever/infection during neutropenic period  Description  INTERVENTIONS:  - Monitor WBC    Outcome: Progressing     Problem: SAFETY ADULT  Goal: Patient will remain free of falls  Description  INTERVENTIONS:  - Assess patient frequently for physical needs  -  Identify cognitive and physical deficits and behaviors that affect risk of falls    -  [place] fall precautions as indicated by assessment   - Educate patient/family on patient safety including physical limitations  - Instruct patient to call for assistance with activity based on assessment  - Modify environment to reduce risk of injury  - Consider OT/PT consult to assist with strengthening/mobility  Outcome: Progressing  Goal: Maintain or return to baseline ADL function  Description  INTERVENTIONS:  -  Assess patient's ability to carry out ADLs; assess patient's baseline for ADL function and identify physical deficits which impact ability to perform ADLs (bathing, care of mouth/teeth, toileting, grooming, dressing, etc )  - Assess/evaluate cause of self-care deficits   - Assess range of motion  - Assess patient's mobility; develop plan if impaired  - Assess patient's need for assistive devices and provide as appropriate  - Encourage maximum independence but intervene and supervise when necessary  - Involve family in performance of ADLs  - Assess for home care needs following discharge   - Consider OT consult to assist with ADL evaluation and planning for discharge  - Provide patient education as appropriate  Outcome: Progressing  Goal: Maintain or return mobility status to optimal level  Description  INTERVENTIONS:  - Assess patient's baseline mobility status (ambulation, transfers, stairs, etc )    - Identify cognitive and physical deficits and behaviors that affect mobility  - Identify mobility aids required to assist with transfers and/or ambulation (gait belt, sit-to-stand, lift, walker, cane, etc )  - [place] fall precautions as indicated by assessment  - Record patient progress and toleration of activity level on Mobility SBAR; progress patient to next Phase/Stage  - Instruct patient to call for assistance with activity based on assessment  - Consider rehabilitation consult to assist with strengthening/weightbearing, etc   Outcome: Progressing     Problem: DISCHARGE PLANNING  Goal: Discharge to home or other facility with appropriate resources  Description  INTERVENTIONS:  - Identify barriers to discharge w/patient and caregiver  - Arrange for needed discharge resources and transportation as appropriate  - Identify discharge learning needs (meds, wound care, etc )  - Arrange for interpretive services to assist at discharge as needed  - Refer to Case Management Department for coordinating discharge planning if the patient needs post-hospital services based on physician/advanced practitioner order or complex needs related to functional status, cognitive ability, or social support system  Outcome: Progressing     Problem: Knowledge Deficit  Goal: Patient/family/caregiver demonstrates understanding of disease process, treatment plan, medications, and discharge instructions  Description  Complete learning assessment and assess knowledge base    Interventions:  - Provide teaching at level of understanding  - Provide teaching via preferred learning methods  Outcome: Progressing     Problem: COPING  Goal: Pt/Family able to verbalize concerns and demonstrate effective coping strategies  Description  INTERVENTIONS:  - Assist patient/family to identify coping skills, available support systems and cultural and spiritual values  - Provide emotional support, including active listening and acknowledgement of concerns of patient and caregivers  - Reduce environmental stimuli, as able  - Provide patient education  - Assess for spiritual pain/suffering and initiate spiritual care, including notification of Pastoral Care or alvin based community as needed  - Assess effectiveness of coping strategies  Outcome: Progressing  Goal: Will report anxiety at manageable levels  Description  INTERVENTIONS:  - Administer medication as ordered  - Teach and encourage coping skills  - Provide emotional support  - Assess patient/family for anxiety and ability to cope  Outcome: Progressing     Problem: DECISION MAKING  Goal: Pt/Family able to effectively weigh alternatives and participate in decision making related to treatment and care  Description  INTERVENTIONS:  - Identify decision maker  - Determine when there are differences among patient's view, family's view, and healthcare provider's view of patient condition and care goals  - Facilitate patient/family articulation of goals for care  - Help patient/family identify pros/cons of alternative solutions  - Provide information as requested by patient/family  - Respect patient/family rights related to privacy and sharing information   - Serve as a liaison between patient, family and health care team  - Initiate consults as appropriate (Ethics Team, Palliative Care, Family Care Conference, etc )  Outcome: Progressing     Problem: Nutrition/Hydration-ADULT  Goal: Nutrient/Hydration intake appropriate for improving, restoring or maintaining nutritional needs  Description  Monitor and assess patient's nutrition/hydration status for malnutrition  Collaborate with interdisciplinary team and initiate plan and interventions as ordered  Monitor patient's weight and dietary intake as ordered or per policy  Determine patient's food allergies and provide high-protein, high-caloric foods as appropriate       INTERVENTIONS:  - Monitor oral intake, urinary output, labs, and treatment plans  - Assess nutrition and hydration status and recommend course of action  - Evaluate amount of meals eaten  - Assist patient with eating if meal selections if needed   - Recommend/ encourage appropriate diets   - Order, calculate, and assess calorie counts as needed  - Assess need for intravenous fluids  - Provide specific nutrition/hydration education as appropriate  - Include patient/family/caregiver in decisions related to nutrition   Outcome: Progressing

## 2019-09-14 NOTE — ASSESSMENT & PLAN NOTE
Patient has had progressively worsening odynophagia secondary to a tonsillectomy performed in April 2019    ENT consultation appreciated, no evidence of bacterial or fungal infection at this time  Chronic hydration likely etiology of patient's dysphonia, vocal cord evaluation normal without any nodules, polyps or growths  Maintained on oral nystatin

## 2019-09-14 NOTE — ASSESSMENT & PLAN NOTE
The patient was seen in consultation by GI for exacerbation of her ulcerative colitis  She is still on Solu-Medrol 40 mg IV Q 8 hrs  She recently underwent a colonoscopy by GI on 08/30/2019 which showed moderately severe proctosigmoiditis with 2 linear ulcers noted in the mid rectum  Biopsies were taken to rule out CMV    Random biopsies were taken from ascending, transverse, descending, and sigmoid colon   -awaiting further follow-up from GI

## 2019-09-14 NOTE — PROGRESS NOTES
Progress Note - Eliz Salas 1975, 53 y o  female MRN: [id number]    Unit/Bed#: E2 -01 Encounter: [id number]    Primary Care Provider: Jazmin Begum PA-C   Date and time admitted to hospital: 9/12/2019  5:40 PM        Odynophagia  Assessment & Plan  Patient has had progressively worsening odynophagia secondary to a tonsillectomy performed in April 2019  ENT consultation appreciated, no evidence of bacterial or fungal infection at this time  Chronic hydration likely etiology of patient's dysphonia, vocal cord evaluation normal without any nodules, polyps or growths  Maintained on oral nystatin    Dysphagia  Assessment & Plan  Patient has had progressively worsening dysphagia secondary to a tonsillectomy performed in April 2019  Patient currently tolerating oral intake states she does need to drink more fluids, etiology still unclear  ENT and GI evaluation appreciated  Given patient's dry eyes, oral dysphagia due to having a dry mouth concern for possible Sjogren  Will send for Sjogren antibodies, patient will need to follow with Rheumatology outpatient  Will also need to follow with Ophthalmology outpatient   Continue supportive care  Completed video barium swallow study, tolerating a regular diet     * Severe sepsis [facility])  Assessment & Plan  Patient was at [facility] with severe sepsis that was present on admission there, due to gram-negative bacteremia  She was seen in consultation by Infectious Disease and was initially treated with IV cefepime  She was then transitioned to PO Levaquin 750 mg daily on 08/31 and she completed a 14 day course of antibiotic therapy  Patient afebrile, leukocytosis secondary to steroid use however this is trending down    Bacteremia due to Gram-negative bacteria  Assessment & Plan  Patient initially presented to [address] [place] [address] with severe sepsis secondary to gram-negative bacteremia    She was initially treated with IV cefepime and was seen in consultation by Infectious Diseases  She was eventually transitioned to PO Levaquin 750 mg daily on 08/31 and she completed a 14 day course of antibiotic therapy  Mild protein-calorie malnutrition ([address] )  Assessment & Plan  Malnutrition Findings:           BMI Findings: Body mass index is 22 05 kg/m²  Encourage oral intake    Mass of left axilla  Assessment & Plan  Due to suspicion of mass in left axilla, left axillary ultrasound was obtained during her last admission at [address]  Left axillary ultrasound showed-no fluid collections or suspicious masses detected  Left axillary lymph nodes, 1 of which is mildly prominent in size, however all visualized lymph nodes demonstrate normal intrinsic morphology  This is of doubtful clinical significance  Immunosuppressed status [facility])  Assessment & Plan  Patient is currently receiving IVIG treatment  She is also currently on chronic Remicade treatment  Mast cell activation syndrome [facility])  Assessment & Plan  The patient is seen regularly and follows up at [facility]  Continue the patient's scheduled medications from [facility]  Hypothyroidism  Assessment & Plan  Resume levothyroxine 50 mcg daily  Vitamin D deficiency  Assessment & Plan  Will need outpatient follow-up with PCP  Exacerbation of ulcerative colitis with rectal bleeding [facility])  Assessment & Plan  The patient was seen in consultation by GI for exacerbation of her ulcerative colitis  She is still on Solu-Medrol 40 mg IV Q 8 hrs  She recently underwent a colonoscopy by GI on 08/30/2019 which showed moderately severe proctosigmoiditis with 2 linear ulcers noted in the mid rectum  Biopsies were taken to rule out CMV  Random biopsies were taken from ascending, transverse, descending, and sigmoid colon   -awaiting further follow-up from GI    Possible drug-seeking behavior  -PDMP reviewed, patient takes tramadol at home    At this point patient is getting very upset and aggressive about receiving Dilaudid with Benadryl, I discussed with patient regarding transitioning to oral pain control to help in discharge planning in the next 24 hours  Patient initially agreeable to start tramadol for moderate pain and keep Dilaudid for severe pain  Patient then becoming upset that she wants her Bentyl to be increased to 50 mg IV Q 4 hours  Also stating that she is allergic to tramadol however patient tolerates tramadol at home and has been prescribed this  VTE Pharmacologic Prophylaxis:   Pharmacologic:  Lovenox    Patient Centered Rounds: I have performed bedside rounds with nursing staff today  Time Spent for Care: 20 minutes  More than 50% of total time spent on counseling and coordination of care as described above  Current Length of Stay: 2 day(s)    Current Patient Status: Inpatient   Certification Statement: The patient will continue to require additional inpatient hospital stay due to Abdominal pain    Discharge Plan / Estimated Discharge Date: 24-48 hours    Code Status: Level 1 - Full Code      Subjective:   Patient seen and examined at bedside, complaining of intermittent abdominal pain, tolerating regular diet    Objective:     Vitals:   Temp (24hrs), Av 6 °F (37 °C), Min:98 2 °F (36 8 °C), Max:99 3 °F (37 4 °C)    Temp:  [98 2 °F (36 8 °C)-99 3 °F (37 4 °C)] 98 3 °F (36 8 °C)  HR:  [68-93] 93  Resp:  [16-20] 18  BP: (101-111)/(57-72) 101/57  SpO2:  [97 %-98 %] 97 %  Body mass index is 22 05 kg/m²  Input and Output Summary (last 24 hours):        Intake/Output Summary (Last 24 hours) at 2019 1736  Last data filed at 2019 1211  Gross per 24 hour   Intake 1821 25 ml   Output --   Net 1821 25 ml       Physical Exam:    Constitutional: Patient is oriented to person, place and time, no acute distress  HEENT:  Normocephalic, atraumatic, EOMI, PERRLA, no scleral icterus, no pallor, moist oral mucosa  Neck:  Supple, no masses, no thyromegaly, no bruits Normal range of motion  Lymph nodes:  No lymphadenopathy  Cardiovascular: Normal S1S2, RRR, No murmurs/rubs/gallops appreciated  Pulmonary:  Bilateral air entry, No rhonchi/rales/wheezing appreciated  Abdominal: Soft, Bowel sounds present, Non-tender, Non-distended, No rebound/guarding, no hepatomegaly   Musculoskeletal: No tenderness/abnormality   Extremities:  No cyanosis, clubbing or edema  Peripheral pulses palpable and equal bilaterally  Neurological: Cranial nerves II-XII grossly intact, sensation intact, otherwise no focal neurological symptoms  Skin: Skin is warm and dry, no rashes  Additional Data:     Labs:    Results from last 7 days   Lab Units 09/14/19  0607   WBC Thousand/uL 16 57*   HEMOGLOBIN g/dL 13 1   HEMATOCRIT % 40 7   PLATELETS Thousands/uL 382   NEUTROS PCT % 85*   LYMPHS PCT % 9*   MONOS PCT % 5   EOS PCT % 0     Results from last 7 days   Lab Units 09/14/19  0607 09/13/19  0511   POTASSIUM mmol/L 4 2 4 4   CHLORIDE mmol/L 102 101   CO2 mmol/L 28 29   BUN mg/dL 10 9   CREATININE mg/dL 0 66 0 69   CALCIUM mg/dL 8 5 8 5   ALK PHOS U/L  --  47   ALT U/L  --  90*   AST U/L  --  32            I Have Reviewed All Lab Data Listed Above  Recent Cultures (last 7 days):     Results from last 7 days   Lab Units 09/11/19  0424 09/11/19  0423   BLOOD CULTURE  No Growth at 72 hrs  No Growth at 72 hrs         Last 24 Hours Medication List:     Current Facility-Administered Medications:  acetaminophen 650 mg Oral Q6H PRN Reynaldo Ayala MD    barium sulfate 1 tablet Oral Once in imaging Reynaldo Ayala MD    butalbital-acetaminophen-caffeine 1 tablet Oral Q8H PRN Reynaldo Ayala MD    cetirizine 20 mg Oral BID Reynaldo Ayala MD    diphenhydrAMINE 50 mg Intravenous Q6H PRN Guy Perez MD    enoxaparin 40 mg Subcutaneous Q24H Reynaldo Ayala MD    HYDROmorphone 0 5 mg Intravenous Q6H PRN Guy Perez MD    levothyroxine 50 mcg Oral Early Morning Reynaldo Ayala MD    lidocaine 2 patch Topical Daily Ayesha Carr MD    LORazepam 1 mg Oral Q8H PRN Chepe Alonso MD    methylPREDNISolone sodium succinate 20 mg Intravenous Q8H Debi Miranda MD    metoclopramide 5 mg Intravenous 4x Daily (AC & HS) Chepe Alonso MD    montelukast 10 mg Oral BID Chepe Alonso MD    nystatin 500,000 Units Swish & Spit 4x Daily Ayesha Carr MD    ondansetron 4 mg Intravenous Q6H PRN Chepe Alonso MD    pantoprazole 40 mg Oral BID AC Ayesha Carr MD    polyethylene glycol 17 g Oral BID Ayesha Carr MD    ranitidine 300 mg Oral BID AC Chepe Alonso MD    saccharomyces boulardii 250 mg Oral BID Chepe Alonso MD    saliva substitute 5 spray Mouth/Throat TID Chepe Alonso MD    simethicone 80 mg Oral Q6H PRN Chepe Alonso MD    sodium chloride 75 mL/hr Intravenous Continuous Chepe Alonso MD Last Rate: 75 mL/hr (09/14/19 1211)   temazepam 30 mg Oral HS Chepe Alonso MD    traMADol 50 mg Oral Q4H PRN Negar Bell MD         Today, Patient Was Seen By: Negar Bell MD

## 2019-09-14 NOTE — ASSESSMENT & PLAN NOTE
Patient has had progressively worsening dysphagia secondary to a tonsillectomy performed in April 2019    Patient currently tolerating oral intake states she does need to drink more fluids, etiology still unclear  ENT and GI evaluation appreciated  Given patient's dry eyes, oral dysphagia due to having a dry mouth concern for possible Sjogren  Will send for Sjogren antibodies, patient will need to follow with Rheumatology outpatient  Will also need to follow with Ophthalmology outpatient   Continue supportive care  Completed video barium swallow study, tolerating a regular diet

## 2019-09-14 NOTE — ASSESSMENT & PLAN NOTE
Malnutrition Findings:           BMI Findings: Body mass index is 22 05 kg/m²  Can consider nutrition consult

## 2019-09-14 NOTE — ASSESSMENT & PLAN NOTE
Patient was at [facility] with severe sepsis that was present on admission there, due to gram-negative bacteremia  She was seen in consultation by Infectious Disease and was initially treated with IV cefepime  She was then transitioned to PO Levaquin 750 mg daily on 08/31 and she completed a 14 day course of antibiotic therapy    Patient afebrile, leukocytosis secondary to steroid use however this is trending down

## 2019-09-14 NOTE — ASSESSMENT & PLAN NOTE
Patient initially presented to [address] [place] [address] with severe sepsis secondary to gram-negative bacteremia  She was initially treated with IV cefepime and was seen in consultation by Infectious Diseases  She was eventually transitioned to PO Levaquin 750 mg daily on 08/31 and she completed a 14 day course of antibiotic therapy

## 2019-09-14 NOTE — ASSESSMENT & PLAN NOTE
Due to suspicion of mass in left axilla, left axillary ultrasound was obtained during her last admission at [address]  Left axillary ultrasound showed-no fluid collections or suspicious masses detected  Left axillary lymph nodes, 1 of which is mildly prominent in size, however all visualized lymph nodes demonstrate normal intrinsic morphology  This is of doubtful clinical significance

## 2019-09-14 NOTE — ASSESSMENT & PLAN NOTE
The patient is seen regularly and follows up at [facility]  Continue the patient's scheduled medications from [facility]

## 2019-09-15 PROBLEM — Z76.5 DRUG-SEEKING BEHAVIOR: Status: ACTIVE | Noted: 2019-09-15

## 2019-09-15 PROCEDURE — 99239 HOSP IP/OBS DSCHRG MGMT >30: CPT | Performed by: INTERNAL MEDICINE

## 2019-09-15 RX ORDER — POLYETHYLENE GLYCOL 3350 17 G/17G
17 POWDER, FOR SOLUTION ORAL 2 TIMES DAILY
Qty: 14 EACH | Refills: 0 | Status: SHIPPED | OUTPATIENT
Start: 2019-09-15 | End: 2021-01-20

## 2019-09-15 RX ORDER — TEMAZEPAM 30 MG/1
30 CAPSULE ORAL
Qty: 5 CAPSULE | Refills: 0 | Status: SHIPPED | OUTPATIENT
Start: 2019-09-15 | End: 2019-10-01 | Stop reason: SDUPTHER

## 2019-09-15 RX ORDER — TRAMADOL HYDROCHLORIDE 50 MG/1
50 TABLET ORAL EVERY 4 HOURS PRN
Qty: 6 TABLET | Refills: 0 | Status: SHIPPED | OUTPATIENT
Start: 2019-09-15 | End: 2019-09-30 | Stop reason: SDUPTHER

## 2019-09-15 RX ORDER — ONDANSETRON 4 MG/1
4 TABLET, FILM COATED ORAL EVERY 8 HOURS PRN
Qty: 20 TABLET | Refills: 0 | Status: SHIPPED | OUTPATIENT
Start: 2019-09-15 | End: 2020-08-03

## 2019-09-15 RX ORDER — DIPHENHYDRAMINE HYDROCHLORIDE 50 MG/ML
50 INJECTION INTRAMUSCULAR; INTRAVENOUS EVERY 6 HOURS PRN
Status: DISCONTINUED | OUTPATIENT
Start: 2019-09-15 | End: 2019-09-16

## 2019-09-15 RX ORDER — PREDNISONE 20 MG/1
40 TABLET ORAL DAILY
Status: DISCONTINUED | OUTPATIENT
Start: 2019-09-15 | End: 2019-09-17 | Stop reason: HOSPADM

## 2019-09-15 RX ORDER — PREDNISONE 20 MG/1
TABLET ORAL
Qty: 28 TABLET | Refills: 0 | Status: SHIPPED | OUTPATIENT
Start: 2019-09-15 | End: 2019-09-17

## 2019-09-15 RX ORDER — PREDNISONE 10 MG/1
TABLET ORAL
Qty: 16 TABLET | Refills: 0 | Status: SHIPPED | OUTPATIENT
Start: 2019-09-15 | End: 2019-09-15

## 2019-09-15 RX ORDER — SACCHAROMYCES BOULARDII 250 MG
250 CAPSULE ORAL 2 TIMES DAILY
Qty: 60 CAPSULE | Refills: 0 | Status: SHIPPED | OUTPATIENT
Start: 2019-09-15 | End: 2019-09-30 | Stop reason: SDUPTHER

## 2019-09-15 RX ORDER — DIPHENHYDRAMINE HYDROCHLORIDE 50 MG/ML
50 INJECTION INTRAMUSCULAR; INTRAVENOUS EVERY 4 HOURS PRN
Status: DISCONTINUED | OUTPATIENT
Start: 2019-09-15 | End: 2019-09-15

## 2019-09-15 RX ADMIN — TRAMADOL HYDROCHLORIDE 50 MG: 50 TABLET, FILM COATED ORAL at 02:06

## 2019-09-15 RX ADMIN — PREDNISONE 40 MG: 20 TABLET ORAL at 11:00

## 2019-09-15 RX ADMIN — PANTOPRAZOLE SODIUM 40 MG: 40 TABLET, DELAYED RELEASE ORAL at 16:38

## 2019-09-15 RX ADMIN — LORAZEPAM 1 MG: 1 TABLET ORAL at 13:26

## 2019-09-15 RX ADMIN — TRAMADOL HYDROCHLORIDE 50 MG: 50 TABLET, FILM COATED ORAL at 09:12

## 2019-09-15 RX ADMIN — Medication 5 SPRAY: at 16:41

## 2019-09-15 RX ADMIN — RANITIDINE 300 MG: 150 TABLET ORAL at 16:38

## 2019-09-15 RX ADMIN — ONDANSETRON 4 MG: 2 INJECTION INTRAMUSCULAR; INTRAVENOUS at 19:17

## 2019-09-15 RX ADMIN — LORAZEPAM 1 MG: 1 TABLET ORAL at 19:16

## 2019-09-15 RX ADMIN — DIPHENHYDRAMINE HYDROCHLORIDE 50 MG: 50 INJECTION, SOLUTION INTRAMUSCULAR; INTRAVENOUS at 02:03

## 2019-09-15 RX ADMIN — DIPHENHYDRAMINE HYDROCHLORIDE 50 MG: 50 INJECTION, SOLUTION INTRAMUSCULAR; INTRAVENOUS at 12:55

## 2019-09-15 RX ADMIN — METOCLOPRAMIDE 5 MG: 5 INJECTION, SOLUTION INTRAMUSCULAR; INTRAVENOUS at 22:21

## 2019-09-15 RX ADMIN — RANITIDINE 300 MG: 150 TABLET ORAL at 11:08

## 2019-09-15 RX ADMIN — SODIUM CHLORIDE 75 ML/HR: 0.9 INJECTION, SOLUTION INTRAVENOUS at 00:51

## 2019-09-15 RX ADMIN — DIPHENHYDRAMINE HYDROCHLORIDE 50 MG: 50 INJECTION, SOLUTION INTRAMUSCULAR; INTRAVENOUS at 19:16

## 2019-09-15 RX ADMIN — PANTOPRAZOLE SODIUM 40 MG: 40 TABLET, DELAYED RELEASE ORAL at 11:07

## 2019-09-15 RX ADMIN — CETIRIZINE HYDROCHLORIDE 20 MG: 10 TABLET, FILM COATED ORAL at 12:37

## 2019-09-15 RX ADMIN — NYSTATIN 500000 UNITS: 100000 SUSPENSION ORAL at 19:17

## 2019-09-15 RX ADMIN — TEMAZEPAM 30 MG: 15 CAPSULE ORAL at 22:21

## 2019-09-15 RX ADMIN — ONDANSETRON 4 MG: 2 INJECTION INTRAMUSCULAR; INTRAVENOUS at 13:26

## 2019-09-15 RX ADMIN — CETIRIZINE HYDROCHLORIDE 20 MG: 10 TABLET, FILM COATED ORAL at 19:18

## 2019-09-15 RX ADMIN — Medication 5 SPRAY: at 19:33

## 2019-09-15 RX ADMIN — MONTELUKAST SODIUM 10 MG: 10 TABLET, COATED ORAL at 12:36

## 2019-09-15 RX ADMIN — SODIUM CHLORIDE 75 ML/HR: 0.9 INJECTION, SOLUTION INTRAVENOUS at 20:29

## 2019-09-15 RX ADMIN — MONTELUKAST SODIUM 10 MG: 10 TABLET, COATED ORAL at 19:21

## 2019-09-15 RX ADMIN — DIPHENHYDRAMINE HYDROCHLORIDE 50 MG: 50 INJECTION, SOLUTION INTRAMUSCULAR; INTRAVENOUS at 09:12

## 2019-09-15 RX ADMIN — TRAMADOL HYDROCHLORIDE 50 MG: 50 TABLET, FILM COATED ORAL at 19:32

## 2019-09-15 NOTE — NURSING NOTE
Called to room by patient who states she took her epipen that she had because no one was doing anything for her  resp easy  No distress noted  Talking full sentences without difficulty    Patient aware dr Daniel Reina was paged and will be in to talk with her

## 2019-09-15 NOTE — ASSESSMENT & PLAN NOTE
The patient is seen regularly and follows up at [facility]  Continue the patient's scheduled medications from [facility]

## 2019-09-15 NOTE — PROGRESS NOTES
Pt states that the Benadryl did nothing for her s/s and she continues to feel like she is unable to swallow and that her pills are stuck in her throat  Pt does not appear in any apparent distress  Pt requested doses of Ativan and Zofran so I administered them  Will continue to monitor

## 2019-09-15 NOTE — ASSESSMENT & PLAN NOTE
Patient was at [facility] with severe sepsis that was present on admission there, due to gram-negative bacteremia  She was seen in consultation by Infectious Disease and was initially treated with IV cefepime  She was then transitioned to PO Levaquin 750 mg daily on 08/31 and she completed a 14 day course of antibiotic therapy    Patient afebrile, leukocytosis secondary to steroid use however this is trending down

## 2019-09-15 NOTE — PROGRESS NOTES
RN went into room when pt activated call bell to administer morning medication and pt states that she does not want any medications other than her IV Benadryl and her PO tramadol  RN administered and held other medications  Notified pt that attending and GI would be in shortly to talk to her

## 2019-09-15 NOTE — PROGRESS NOTES
After RN had notified pt that MD would not be adjusting her Benadryl dosage at this time pt called RN back into room and stated that she reached out to [facility] and would like MD to reach out to the physicians that treated her at [facility] and their names were Dr Torsten Masters and Dr Charles Castro   RN notified MD

## 2019-09-15 NOTE — ASSESSMENT & PLAN NOTE
PDMP reviewed, patient takes tramadol at home  discussed with patient in depth yesterday regarding her pain regimen and explained that patient will be discharged in the next 24 hours and will need to transition to oral options patient stated that she does not tolerate oxycodone or Percocet and prefers tramadol  Patient understood that I will be introducing tramadol in decreasing her Dilaudid  Patient also receiving Benadryl 50 mg IV q 4 hours as a very high dose, I decreased this to 25 mg every 6 hours  Patient got very upset stating she needs 50 mg therefore this was changed to 50 mg IV q 6 hours  -overnight patient has been very upset complaining to nurses and other staff that she wants her Benadryl 50 mg every 4 hours as she needs this to help her eat and she is allergic to all medications     -this morning myself, GI team PA, Janneth Enrique, nurse Zara at bedside explained to patient that she is stable to be transitioned to oral steroids, patient requested she will need Benadryl before taking her steroids I explained that she just received her Benadryl 50 mg IV and will receive prednisone and I will therefore work on her discharge as she can be discharged with outpatient follow-up  -patient had agreed to this plan and understood  -I was made aware by nurse that patient stated that she did not want her prednisone right away  -patient was given prednisone, Zantac and Protonix at 11:00 a m  And Benadryl at 1:00 p m   -patient stating she feels like her pills are stuck in her throat, patient was evaluated by nurse, not in any distress  -patient then called out to nurse that her symptoms are getting worse and she feels like her tongue is tingling  Patient's vitals assessed and stable, she was in no distress, able to speak in clear sentences    -patient had an EpiPen at her bedside which she gives herself  -she was evaluated by myself with nursing supervisor at bedside and nurse Tamara Moss at bedside, patient's vitals stable, no respiratory distress, no oral mucosa erythema, irritation or edema  -I explained that patient is medically stable for discharge, her prescriptions were filled at the pharmacy this morning and she can follow with her PCP in GI outpatient   -patient proceeds to get upset and started yelling and shouting that she does not want to leave and she is going to appeal her discharge

## 2019-09-15 NOTE — ASSESSMENT & PLAN NOTE
Patient initially presented to [address] [place] [address] with severe sepsis secondary to gram-negative bacteremia  She was initially treated with IV cefepime and was seen in consultation by Infectious Diseases  She was eventually transitioned to PO Levaquin 750 mg daily on 08/31 and she completed a 14 day course of antibiotic therapy

## 2019-09-15 NOTE — PROGRESS NOTES
Pt called out and informed nurse she is requesting to speak with MD and is demanding that her Benadryl dosage frequency be increased to Q4hrs  Paged MD and awaiting response  Will make oncoming RN aware

## 2019-09-15 NOTE — PROGRESS NOTES
Called to bedside by [facility] provider Jeffrey Arriaza PA-C at the patient's request as patientt is upset that the physician Dr Arturo Crook decreased her dose of Benadryl during the day today, which she tells me is causing her mast cell disease to flare up  Patient was evaluated prior to my arrival by the PA and determined that the dosage is appropriate and is in agreement with the physician that no changes be made at this time  The patient is very unhappy with these changes and after speaking again with the PA, no changes will be made at this time; Patient is refusing to take any further meds at this time until the Benadryl frequency is changed; Spoke with ROLLY Wilkerson; will need to be addressed with [facility] physician again during the day Sunday

## 2019-09-15 NOTE — PLAN OF CARE
Problem: PAIN - ADULT  Goal: Verbalizes/displays adequate comfort level or baseline comfort level  Description  Interventions:  - Encourage patient to monitor pain and request assistance  - Assess pain using appropriate pain scale  - Administer analgesics based on type and severity of pain and evaluate response  - Implement non-pharmacological measures as appropriate and evaluate response  - Consider cultural and social influences on pain and pain management  - Notify physician/advanced practitioner if interventions unsuccessful or patient reports new pain  Outcome: Progressing     Problem: INFECTION - ADULT  Goal: Absence or prevention of progression during hospitalization  Description  INTERVENTIONS:  - Assess and monitor for signs and symptoms of infection  - Monitor lab/diagnostic results  - Monitor all insertion sites  - Monitor nasal secretions for changes in amount and color  - Administer medications as ordered  - Instruct and encourage patient and family to use good hand hygiene technique   Outcome: Progressing     Problem: SAFETY ADULT  Goal: Patient will remain free of falls  Description  INTERVENTIONS:  - Assess patient frequently for physical needs  -  Identify cognitive and physical deficits and behaviors that affect risk of falls    -  [place] fall precautions as indicated by assessment   - Educate patient/family on patient safety including physical limitations  - Instruct patient to call for assistance with activity based on assessment  - Modify environment to reduce risk of injury  - Consider OT/PT consult to assist with strengthening/mobility  Outcome: Progressing  Goal: Maintain or return to baseline ADL function  Description  INTERVENTIONS:  -  Assess patient's ability to carry out ADLs; assess patient's baseline for ADL function and identify physical deficits which impact ability to perform ADLs (bathing, care of mouth/teeth, toileting, grooming, dressing, etc )  - Assess/evaluate cause of self-care deficits   - Assess range of motion  - Assess patient's mobility; develop plan if impaired  - Assess patient's need for assistive devices and provide as appropriate  - Encourage maximum independence but intervene and supervise when necessary  - Involve family in performance of ADLs  - Assess for home care needs following discharge   - Consider OT consult to assist with ADL evaluation and planning for discharge  - Provide patient education as appropriate  Outcome: Progressing  Goal: Maintain or return mobility status to optimal level  Description  INTERVENTIONS:  - Assess patient's baseline mobility status (ambulation, transfers, stairs, etc )    - Identify cognitive and physical deficits and behaviors that affect mobility  - Identify mobility aids required to assist with transfers and/or ambulation (gait belt, sit-to-stand, lift, walker, cane, etc )  - [place] fall precautions as indicated by assessment  - Record patient progress and toleration of activity level on Mobility SBAR; progress patient to next Phase/Stage  - Instruct patient to call for assistance with activity based on assessment  - Consider rehabilitation consult to assist with strengthening/weightbearing, etc   Outcome: Progressing     Problem: Knowledge Deficit  Goal: Patient/family/caregiver demonstrates understanding of disease process, treatment plan, medications, and discharge instructions  Description  Complete learning assessment and assess knowledge base    Interventions:  - Provide teaching at level of understanding  - Provide teaching via preferred learning methods  Outcome: Progressing     Problem: DECISION MAKING  Goal: Pt/Family able to effectively weigh alternatives and participate in decision making related to treatment and care  Description  INTERVENTIONS:  - Identify decision maker  - Determine when there are differences among patient's view, family's view, and healthcare provider's view of patient condition and care goals  - Facilitate patient/family articulation of goals for care  - Help patient/family identify pros/cons of alternative solutions  - Provide information as requested by patient/family  - Respect patient/family rights related to privacy and sharing information   - Serve as a liaison between patient, family and health care team  - Initiate consults as appropriate (Ethics Team, Palliative Care, Family Care Conference, etc )  Outcome: Progressing     Problem: Nutrition/Hydration-ADULT  Goal: Nutrient/Hydration intake appropriate for improving, restoring or maintaining nutritional needs  Description  INTERVENTIONS:  - Monitor oral intake, urinary output, labs, and treatment plans  - Assess nutrition and hydration status and recommend course of action  - Evaluate amount of meals eaten  - Assist patient with eating if meal selections if needed   - Recommend/ encourage appropriate diets   - Assess need for intravenous fluids  - Provide specific nutrition/hydration education as appropriate  - Include patient in decisions related to nutrition    Outcome: Progressing

## 2019-09-15 NOTE — PROGRESS NOTES
After [facility] PAC in to evaluate patient and no changes were made in medications, patient very upset - crying, highly emotional  Patient complaining of nausea  Ondansetron administered as ordered  At 2036, patient requested the Benadryl and also complaining of right upper quadrant abdominal pain rating it 7/10  Benadryl 50 mg IV administered along with Hydromorphone 0 5 mg IV  Patient refusing all oral medications stating she feels like the back of her throat is constricting and she cannot swallow  Patient stating she is refusing all medications until the next dose of Benadryl is due because she feels she is already 2 hours behind in preventing flare since Benadryl frequency was decreased  RN encouraged patient to at least take the IV medications that were ordered when due (Reglan, Solumedrol), but patient states she will still refuse  Hospital Supervisor in to see patient at the request of SELIN Traore

## 2019-09-15 NOTE — PROGRESS NOTES
Was in pt room to change IV site d/t previous site infiltrating  Pt was resting in bed with no visible s/s of discomfort or difficulty swallowing  When RN went to administer medications for the evening the pt requested Tramadol and IV Benadryl  RN informed pt that she could not have IV Benadryl until 1855 because orders were changed back to Q6hrs  Pt became agitated and tearful stating "I don't know why she is playing this game  I am not taking any of that or any medication without my Benadryl and have a reaction  You need to call her " RN leona FISCHER and made aware pt is refusing medications unless her Benadryl is switched back to Q4hrs  MD states she will not be changing the orders as she does not feel comfortable continuing to administer that dosage  RN will inform patient

## 2019-09-15 NOTE — ASSESSMENT & PLAN NOTE
Patient initially presented to [facility] with severe sepsis secondary to gram-negative bacteremia  She was initially treated with IV cefepime and was seen in consultation by Infectious Diseases  She was eventually transitioned to PO Levaquin 750 mg daily on 08/31 and she completed a 14 day course of antibiotic therapy

## 2019-09-15 NOTE — ASSESSMENT & PLAN NOTE
Due to suspicion of mass in left axilla, left axillary ultrasound was obtained during her last admission at [facility]  Left axillary ultrasound showed-no fluid collections or suspicious masses detected  Left axillary lymph nodes, 1 of which is mildly prominent in size, however all visualized lymph nodes demonstrate normal intrinsic morphology  This is of doubtful clinical significance

## 2019-09-15 NOTE — ASSESSMENT & PLAN NOTE
Due to suspicion of mass in left axilla, left axillary ultrasound was obtained during her last admission at [address]  Left axillary ultrasound showed-no fluid collections or suspicious masses detected  Left axillary lymph nodes, 1 of which is mildly prominent in size, however all visualized lymph nodes demonstrate normal intrinsic morphology  This is of doubtful clinical significance

## 2019-09-15 NOTE — PROGRESS NOTES
Earlier in the day the Benadryl order had been changed from Benadryl 50 mg IV every 4 hours prn to Benadryl 25 mg IV every 6 hours prn  Patient was upset because she felt that she was starting to get "flare symptoms"  Dr Tanisha Wilks was called at that time and changed the order to Benadryl 50 mg IV every 6 hours prn  Day shift RN stated that patient had since complained about the frequency that IV Benadryl was ordered and wanted it returned back to the original order of Benadryl 50 mg IV every 4 hour prn  Day shift RN reported that she had paged Dr Tanisha Wilks several times over the past 2 hours without a return call  Patient again calling RN to ask about the Benadryl  RN went in to assess patient  Patient requesting the frequency of IV Benadryl be changed to every 4 hours prn which is what she has been taking since hospitalized  Patient states she cannot take the generic oral diphenhydramine due to the fillers and [facility]'s doesn't carry the Benadryl brand pill  Patient was anxious and crying that since the IV benadryl frequency was changed from every 4 hours prn to every 6 hours prn, she 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  Patient states the flare is from exposure to "corn fillers" in some of the oral medications she has been taking here  And that she needs the Benadryl to help prevent further symptoms  Patient states she feels her throat is tightening and she cannot swallow  Patient conversing with normal speech, no swelling noted in throat or tongue  Lungs clear at this time  O2 sat 97% on room air  No rash noted, but some redness around neck and throat  Patient states she usually doesn't get a rash  RN reassured patient that she would contact the [facility] to evaluate her concerns  Patient agreeable to try some Lorazepam for anxiety  Edenilson Carr, [facility] for [facility] regarding patient request  PAC now in to assess patient

## 2019-09-15 NOTE — ASSESSMENT & PLAN NOTE
The patient was seen in consultation by GI for exacerbation of her ulcerative colitis  She recently underwent a colonoscopy by GI on 08/30/2019 which showed moderately severe proctosigmoiditis with 2 linear ulcers noted in the mid rectum  Biopsies were taken to rule out CMV    Random biopsies were taken from ascending, transverse, descending, and sigmoid colon   -discussed with GI team, patient transition to prednisone 40 mg daily for which she will be maintained on until further GI follow-up outpatient Dr Alyson Rogers

## 2019-09-15 NOTE — SOCIAL WORK
CM received a call from hospital supervisor who stated patient does not want to leave  Patient is able to appeal her discharge as her secondary insurance is Medicare  CM met with patient and had her sign her Medicare note and the Detailed Notice of Discharge  CM requested patient initiate her appeal and provide the tracking number to her primary nurse once she has this since CM would likely be gone once this was done  CM printed all clinical documentation and faxed it to [facility] fax [phone]  Patient is aware that she may be responsible for the hospital bill if the decision is upheld  CM department will continue to follow to assist with discharge coordination

## 2019-09-15 NOTE — PROGRESS NOTES
Progress Note - Tiffani Sheriff 1975, 53 y o  female MRN: [id number]    Unit/Bed#: E2 -01 Encounter: [id number]    Primary Care Provider: Alexia Duncan PA-C   Date and time admitted to hospital: 9/12/2019  5:40 PM        Drug-seeking behavior  Assessment & Plan  PDMP reviewed, patient takes tramadol at home  discussed with patient in depth yesterday regarding her pain regimen and explained that patient will be discharged in the next 24 hours and will need to transition to oral options patient stated that she does not tolerate oxycodone or Percocet and prefers tramadol  Patient understood that I will be introducing tramadol in decreasing her Dilaudid  Patient also receiving Benadryl 50 mg IV q 4 hours as a very high dose, I decreased this to 25 mg every 6 hours  Patient got very upset stating she needs 50 mg therefore this was changed to 50 mg IV q 6 hours  -overnight patient has been very upset complaining to nurses and other staff that she wants her Benadryl 50 mg every 4 hours as she needs this to help her eat and she is allergic to all medications     -this morning myself, GI team PA, Domitila Sandoval, nurse Zara at bedside explained to patient that she is stable to be transitioned to oral steroids, patient requested she will need Benadryl before taking her steroids I explained that she just received her Benadryl 50 mg IV and will receive prednisone and I will therefore work on her discharge as she can be discharged with outpatient follow-up  -patient had agreed to this plan and understood  -I was made aware by nurse that patient stated that she did not want her prednisone right away  -patient was given prednisone, Zantac and Protonix at 11:00 a m   And Benadryl at 1:00 p m   -patient stating she feels like her pills are stuck in her throat, patient was evaluated by nurse, not in any distress  -patient then called out to nurse that her symptoms are getting worse and she feels like her tongue is tingling  Patient's vitals assessed and stable, she was in no distress, able to speak in clear sentences  -patient had an EpiPen at her bedside which she gives herself  -she was evaluated by myself with nursing supervisor at bedside and nurse Harmony Calderon at bedside, patient's vitals stable, no respiratory distress, no oral mucosa erythema, irritation or edema  -I explained that patient is medically stable for discharge, her prescriptions were filled at the pharmacy this morning and she can follow with her PCP in GI outpatient   -patient proceeds to get upset and started yelling and shouting that she does not want to leave and she is going to appeal her discharge  Odynophagia  Assessment & Plan  Patient has had progressively worsening odynophagia secondary to a tonsillectomy performed in April 2019  ENT consultation appreciated, no evidence of bacterial or fungal infection at this time  Chronic hydration likely etiology of patient's dysphonia, vocal cord evaluation normal without any nodules, polyps or growths  Maintained on oral nystatin    Dysphagia  Assessment & Plan  Patient has had progressively worsening dysphagia secondary to a tonsillectomy performed in April 2019  Patient currently tolerating oral intake states she does need to drink more fluids, etiology still unclear  ENT and GI evaluation appreciated  Given patient's dry eyes, oral dysphagia due to having a dry mouth concern for possible Sjogren  Will send for Sjogren antibodies, patient will need to follow with Rheumatology outpatient  Will also need to follow with Ophthalmology outpatient   Continue supportive care  Completed video barium swallow study, tolerating a regular diet    * Severe sepsis [facility])  Assessment & Plan  Patient was at [facility] with severe sepsis that was present on admission there, due to gram-negative bacteremia  She was seen in consultation by Infectious Disease and was initially treated with IV cefepime    She was then transitioned to PO Levaquin 750 mg daily on 08/31 and she completed a 14 day course of antibiotic therapy  Patient afebrile, leukocytosis secondary to steroid use     Bacteremia due to Gram-negative bacteria  Assessment & Plan  Patient initially presented to [address] [place] [address] with severe sepsis secondary to gram-negative bacteremia  She was initially treated with IV cefepime and was seen in consultation by Infectious Diseases  She was eventually transitioned to PO Levaquin 750 mg daily on 08/31 and she completed a 14 day course of antibiotic therapy  Mild protein-calorie malnutrition ([address] )  Assessment & Plan    Body mass index is 22 16 kg/m²  -patient follows with nutritionist outpatient    Mass of left axilla  Assessment & Plan  Due to suspicion of mass in left axilla, left axillary ultrasound was obtained during her last admission at [address] [place] [address]  Left axillary ultrasound showed-no fluid collections or suspicious masses detected  Left axillary lymph nodes, 1 of which is mildly prominent in size, however all visualized lymph nodes demonstrate normal intrinsic morphology  This is of doubtful clinical significance  Immunosuppressed status [facility])  Assessment & Plan  Patient is currently receiving IVIG treatment  She is also currently on chronic Remicade treatment  Mast cell activation syndrome [facility])  Assessment & Plan  The patient is seen regularly and follows up at [facility]  Continue the patient's scheduled medications from [facility]  Hypothyroidism  Assessment & Plan  Resume levothyroxine 50 mcg daily  Vitamin D deficiency  Assessment & Plan  Will need outpatient follow-up with PCP  Exacerbation of ulcerative colitis with rectal bleeding [facility])  Assessment & Plan  The patient was seen in consultation by GI for exacerbation of her ulcerative colitis    She recently underwent a colonoscopy by GI on 08/30/2019 which showed moderately severe proctosigmoiditis with 2 linear ulcers noted in the mid rectum  Biopsies were taken to rule out CMV  Random biopsies were taken from ascending, transverse, descending, and sigmoid colon   -discussed with GI team and patient transition to prednisone 40 mg daily to continue daily until outpatient GI follow-up with Dr Cristina Horton        VTE Pharmacologic Prophylaxis:   Pharmacologic: lovenox    Patient Centered Rounds: I have performed bedside rounds with nursing staff today  Discussions with Specialists or Other Care Team Provider:  Gastroenterology    Time Spent for Care: 40  More than 50% of total time spent on counseling and coordination of care as described above  Current Length of Stay: 3 day(s)    Current Patient Status: Inpatient   Certification Statement: At this point patient is hemodynamically stable for discharge however patient is reluctant to leave and stating she will be appealing her discharge  Discharge Plan / Estimated Discharge Date: awaiting appeal    Code Status: Level 1 - Full Code      Subjective:   Patient seen and examined at bedside, states she feels her throat swelling , denies any chest pain, palpitations, dyspnea, abdominal pain, nausea, vomiting  Objective:     Vitals:   Temp (24hrs), Av 5 °F (36 9 °C), Min:97 7 °F (36 5 °C), Max:99 4 °F (37 4 °C)    Temp:  [97 7 °F (36 5 °C)-99 4 °F (37 4 °C)] 99 4 °F (37 4 °C)  HR:  [60-97] 97  Resp:  [16-20] 18  BP: (108-118)/(64-77) 118/72  SpO2:  [96 %-98 %] 97 %  Body mass index is 22 16 kg/m²  Input and Output Summary (last 24 hours):        Intake/Output Summary (Last 24 hours) at 9/15/2019 1522  Last data filed at 2019 2200  Gross per 24 hour   Intake 600 ml   Output    Net 600 ml       Physical Exam:    Constitutional: Patient is oriented to person, place and time, no acute distress  HEENT:  Normocephalic, atraumatic, EOMI, PERRLA, no scleral icterus, no pallor, moist oral mucosa  Neck:  Supple, no masses, no thyromegaly, no bruits Normal range of motion  Lymph nodes:  No lymphadenopathy  Cardiovascular: Normal S1S2, RRR, No murmurs/rubs/gallops appreciated  Pulmonary:  Bilateral air entry, No rhonchi/rales/wheezing appreciated  Abdominal: Soft, Bowel sounds present, Non-tender, Non-distended, No rebound/guarding, no hepatomegaly   Musculoskeletal: No tenderness/abnormality   Extremities:  No cyanosis, clubbing or edema  Peripheral pulses palpable and equal bilaterally  Neurological: Cranial nerves II-XII grossly intact, sensation intact, otherwise no focal neurological symptoms  Skin: Skin is warm and dry, no rashes  Additional Data:     Labs:    Results from last 7 days   Lab Units 09/14/19  0607   WBC Thousand/uL 16 57*   HEMOGLOBIN g/dL 13 1   HEMATOCRIT % 40 7   PLATELETS Thousands/uL 382   NEUTROS PCT % 85*   LYMPHS PCT % 9*   MONOS PCT % 5   EOS PCT % 0     Results from last 7 days   Lab Units 09/14/19  0607 09/13/19  0511   POTASSIUM mmol/L 4 2 4 4   CHLORIDE mmol/L 102 101   CO2 mmol/L 28 29   BUN mg/dL 10 9   CREATININE mg/dL 0 66 0 69   CALCIUM mg/dL 8 5 8 5   ALK PHOS U/L  --  47   ALT U/L  --  90*   AST U/L  --  32            I Have Reviewed All Lab Data Listed Above  Recent Cultures (last 7 days):     Results from last 7 days   Lab Units 09/11/19  0424 09/11/19  0423   BLOOD CULTURE  No Growth After 4 Days  No Growth After 4 Days         Last 24 Hours Medication List:     Current Facility-Administered Medications:  acetaminophen 650 mg Oral Q6H PRN Cl Gray MD    barium sulfate 1 tablet Oral Once in imaging Cl Gray MD    butalbital-acetaminophen-caffeine 1 tablet Oral Q8H PRN Cl Gray MD    cetirizine 20 mg Oral BID Cl Gray MD    diphenhydrAMINE 50 mg Intravenous Q6H PRN Jose Weston MD    enoxaparin 40 mg Subcutaneous Q24H Cl Gray MD    levothyroxine 50 mcg Oral Early Morning Cl Gray MD    lidocaine 2 patch Topical Daily Cl Gray MD    LORazepam 1 mg Oral Q8H PRN Christina Oconnor MD    metoclopramide 5 mg Intravenous 4x Daily (AC & HS) Christina Oconnor MD    montelukast 10 mg Oral BID Christina Oconnor MD    nystatin 500,000 Units Swish & Spit 4x Daily Ayesha Carr MD    ondansetron 4 mg Intravenous Q6H PRN Christina Oconnor MD    pantoprazole 40 mg Oral BID AC Ayesha Carr MD    polyethylene glycol 17 g Oral BID Chrisitna Oconnor MD    predniSONE 40 mg Oral Daily Maria C Carrera MD    ranitidine 300 mg Oral BID AC Christina Oconnor MD    saccharomyces boulardii 250 mg Oral BID Christina Oconnor MD    saliva substitute 5 spray Mouth/Throat TID Christina Oconnor MD    simethicone 80 mg Oral Q6H PRN Christina Oconnor MD    sodium chloride 75 mL/hr Intravenous Continuous Christina Oconnor MD Last Rate: 75 mL/hr (09/15/19 0051)   temazepam 30 mg Oral HS Christina Oconnor MD    traMADol 50 mg Oral Q4H PRN Maria C Carrera MD         Today, Patient Was Seen By: Maria C Carrera MD

## 2019-09-15 NOTE — PROGRESS NOTES
Pt called out stating that she feels as though her symptoms are getting worse and that her "tongue is tingling and feels like a lump in the back of her throat and feels like she needs to use her Epi pen"  Pt in no apparent signs of distress, is able to speak in a clear voice (occasionally coughing/attempting to clear throat), and VSS  Pt doesn't feel as though she can go home like this, and states that this is "because you messed up my schedule and I thought I was taking my Benadryl with my Prednisone not almost two hours after"  Made MD aware along with Hospital Supervisor as pt has been ok'd for discharge

## 2019-09-15 NOTE — ASSESSMENT & PLAN NOTE
The patient is seen regularly and follows up at [facility]  Continue the patient's scheduled medications from [facility]

## 2019-09-15 NOTE — DISCHARGE SUMMARY
Discharge- Sonja Huber 1975, 53 y o  female MRN: [id number]    Unit/Bed#: E2 -01 Encounter: [id number]    Primary Care Provider: Bernie Sanches PA-C   Date and time admitted to hospital: 9/12/2019  5:40 PM        Drug-seeking behavior  Assessment & Plan  PDMP reviewed, patient takes tramadol at home  discussed with patient in depth yesterday regarding her pain regimen and explained that patient will be discharged in the next 24 hours and will need to transition to oral options patient stated that she does not tolerate oxycodone or Percocet and prefers tramadol  Patient understood that I will be introducing tramadol in decreasing her Dilaudid  Patient also receiving Benadryl 50 mg IV q 4 hours as a very high dose, I decreased this to 25 mg every 6 hours  Patient got very upset stating she needs 50 mg therefore this was changed to 50 mg IV q 6 hours  -overnight patient has been very upset complaining to nurses and other staff that she wants her Benadryl 50 mg every 4 hours as she needs this to help her eat and she is allergic to all medications     -this morning myself, GI team PA, Bear Jorgensen, nurse Zuñiga at bedside explained to patient that she is stable to be transitioned to oral steroids, patient requested she will need Benadryl before taking her steroids I explained that she just received her Benadryl 50 mg IV and will receive prednisone and I will therefore work on her discharge as she can be discharged with outpatient follow-up  -patient had agreed to this plan and understood  -I was made aware by nurse that patient stated that she did not want her prednisone right away  -patient was given prednisone, Zantac and Protonix at 11:00 a m   And Benadryl at 1:00 p m   -patient stating she feels like her pills are stuck in her throat, patient was evaluated by nurse, not in any distress  -patient then called out to nurse that her symptoms are getting worse and she feels like her tongue is tingling  Patient's vitals assessed and stable, she was in no distress, able to speak in clear sentences  -patient had an EpiPen at her bedside which she gives herself  -she was evaluated by myself with nursing supervisor at bedside and nurse Madeline Mejia at bedside, patient's vitals stable, no respiratory distress, no oral mucosa erythema, irritation or edema  -I explained that patient is medically stable for discharge, her prescriptions were filled at the pharmacy this morning and she can follow with her PCP in GI outpatient   -patient proceeds to get upset and started yelling and shouting that she does not want to leave and she is going to appeal her discharge  Odynophagia  Assessment & Plan  Patient has had progressively worsening odynophagia secondary to a tonsillectomy performed in April 2019  ENT consultation appreciated, no evidence of bacterial or fungal infection at this time  Chronic hydration likely etiology of patient's dysphonia, vocal cord evaluation normal without any nodules, polyps or growths  Maintained on oral nystatin      Dysphagia  Assessment & Plan  Patient has had progressively worsening dysphagia secondary to a tonsillectomy performed in April 2019  Patient currently tolerating oral intake states she does need to drink more fluids, etiology still unclear  ENT and GI evaluation appreciated  Given patient's dry eyes, oral dysphagia due to having a dry mouth concern for possible Sjogren  Will send for Sjogren antibodies, patient will need to follow with Rheumatology outpatient  Will also need to follow with Ophthalmology outpatient   Continue supportive care  Completed video barium swallow study, tolerating a regular diet    * Severe sepsis [facility])  Assessment & Plan  Patient was at [facility] with severe sepsis that was present on admission there, due to gram-negative bacteremia  She was seen in consultation by Infectious Disease and was initially treated with IV cefepime    She was then transitioned to PO Levaquin 750 mg daily on 08/31 and she completed a 14 day course of antibiotic therapy  Patient afebrile, leukocytosis secondary to steroid use however this is trending down    Bacteremia due to Gram-negative bacteria  Assessment & Plan  Patient initially presented to [address] [place] [address] with severe sepsis secondary to gram-negative bacteremia  She was initially treated with IV cefepime and was seen in consultation by Infectious Diseases  She was eventually transitioned to PO Levaquin 750 mg daily on 08/31 and she completed a 14 day course of antibiotic therapy  Mild protein-calorie malnutrition ([address] )  Assessment & Plan          Body mass index is 22 16 kg/m²  Follows with nutritionist outpatient    Mass of left axilla  Assessment & Plan  Due to suspicion of mass in left axilla, left axillary ultrasound was obtained during her last admission at [address] [place] [address]  Left axillary ultrasound showed-no fluid collections or suspicious masses detected  Left axillary lymph nodes, 1 of which is mildly prominent in size, however all visualized lymph nodes demonstrate normal intrinsic morphology  This is of doubtful clinical significance  Immunosuppressed status [facility])  Assessment & Plan  Patient is currently receiving IVIG treatment  She is also currently on chronic Remicade treatment  Mast cell activation syndrome [facility])  Assessment & Plan  The patient is seen regularly and follows up at [facility]  Continue the patient's scheduled medications from [facility]  Hypothyroidism  Assessment & Plan  Resume levothyroxine 50 mcg daily  Vitamin D deficiency  Assessment & Plan  Will need outpatient follow-up with PCP  Exacerbation of ulcerative colitis with rectal bleeding [facility])  Assessment & Plan  The patient was seen in consultation by GI for exacerbation of her ulcerative colitis    She recently underwent a colonoscopy by GI on 08/30/2019 which showed moderately severe proctosigmoiditis with 2 linear ulcers noted in the mid rectum  Biopsies were taken to rule out CMV  Random biopsies were taken from ascending, transverse, descending, and sigmoid colon   -discussed with GI team, patient transition to prednisone 40 mg daily for which she will be maintained on until further GI follow-up outpatient Dr Clifton Ryan      Transition of Care Discharge Summary - [place] [address] Internal Medicine    Patient Information: Ida Odonnell 53 y o  female MRN: [id number]  Unit/Bed#: E2 -01 Encounter: [id number]    Discharging Physician / Practitioner: Ganesh Monsivais MD  PCP: Zuhair Caputo PA-C  Admission Date: 9/12/2019  Discharge Date: 09/15/19    Disposition:      Other: home    For Discharges to [facility] SNF:   · Not Applicable to this Patient - Not Applicable to this Patient    Reason for Admission: dysphagia    Discharge Diagnoses:     Principal Problem:    Severe sepsis ([address] )  Active Problems:    Drug-seeking behavior    Odynophagia    Dysphagia    Bacteremia due to Gram-negative bacteria    Exacerbation of ulcerative colitis with rectal bleeding ([facility] )    Vitamin D deficiency    Hypothyroidism    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[address] )    Immunosuppressed status ([address] )    Mass of left axilla    Mild protein-calorie malnutrition ([address] )  Resolved Problems:    * No resolved hospital problems  *      Consultations During Hospital Stay:  · GI  · ENT    Procedures Performed:     · none    Medication Adjustments and Discharge Medications:  · Medication Dosing Tapers - Please refer to Discharge Medication List for details on any medication dosing tapers (if applicable to patient)  · Discharge Medication List: See after visit summary for reconciled discharge medications  Wound Care Recommendations:  When applicable, please see wound care section of After Visit Summary      Diet Recommendations at Discharge:  Diet -        Diet Orders   (From admission, onward) Start     Ordered    09/12/19 1935  Diet Regular; Regular House  Diet effective now     Question Answer Comment   Diet Type Regular    Regular Regular House    RD to adjust diet per protocol? Yes        09/12/19 1934              Fluid Restriction - No Fluid Restriction at Discharge  Significant Findings / Test Results:     Ultrasound left axilla:   No fluid collections or suspicious masses detected      · Left axillary lymph nodes, 1 of which is mildly prominent in size, however, all visualized lymph nodes demonstrate normal intrinsic morphology  This is of doubtful clinical significance  Follow-up if any may be based on clinical grounds  CT abdomen pelvis:Circumferential thickening of the rectum in keeping with a nonspecific proctitis  No bowel pneumatosis  No bowel obstruction  Chest x-ray:  No acute cardiopulmonary disease      Hospital Course:     Octavio Vidales is a 53 y o  female patient who originally presented to the hospital on 9/12/2019 due to abdominal pain, fever, chills initially admitted to [facility] on 08/26/2019  Patient treated for sepsis, bacteremia, evaluated by GI, Infectious Disease  She was subsequently transferred to [facility] as she was requesting evaluation by GI and ENT  Please see above listed diagnosis and related plan for additional information  Condition at Discharge: good     Discharge Day Visit / Exam:     Subjective:  Patient seen and examined at bedside, states she feels like her throat was swelling, denies any chest pain, palpitations, dyspnea    Denies any abdominal pain, nausea, vomiting    Vitals: Blood Pressure: 118/72 (09/15/19 1400)  Pulse: 97 (09/15/19 1400)  Temperature: 99 4 °F (37 4 °C) (09/15/19 0700)  Temp Source: Tympanic (09/15/19 0700)  Respirations: 18 (09/15/19 1400)  Weight - Scale: 60 4 kg (133 lb 2 5 oz) (09/15/19 0532)  SpO2: 97 % (09/15/19 1400)    Physical Exam:    Constitutional: Patient is oriented to person, place and time, no acute distress  HEENT:  Normocephalic, atraumatic, EOMI, PERRLA, no scleral icterus, no pallor, moist oral mucosa  Neck:  Supple, no masses, no thyromegaly, no bruits Normal range of motion  Lymph nodes:  No lymphadenopathy  Cardiovascular: Normal S1S2, RRR, No murmurs/rubs/gallops appreciated  Pulmonary:  Bilateral air entry, No rhonchi/rales/wheezing appreciated  Abdominal: Soft, Bowel sounds present, Non-tender, Non-distended, No rebound/guarding, no hepatomegaly   Musculoskeletal: No tenderness/abnormality   Extremities:  No cyanosis, clubbing or edema  Peripheral pulses palpable and equal bilaterally  Neurological: Cranial nerves II-XII grossly intact, sensation intact, otherwise no focal neurological symptoms  Skin: Skin is warm and dry, no rashes  Discharge instructions/Information to patient and family:   See after visit summary section titled Discharge Instructions for information provided to patient and family  Planned Readmission: no     Discharge Statement:  I spent 45 minutes discharging the patient  This time was spent on the day of discharge  I had direct contact with the patient on the day of discharge  Greater than 50% of the total time was spent examining patient, answering all patient questions, arranging and discussing plan of care with patient as well as directly providing post-discharge instructions  Additional time then spent on discharge activities      ** Please Note: This note has been constructed using a voice recognition system **

## 2019-09-15 NOTE — ASSESSMENT & PLAN NOTE
Patient was at [facility] with severe sepsis that was present on admission there, due to gram-negative bacteremia  She was seen in consultation by Infectious Disease and was initially treated with IV cefepime  She was then transitioned to PO Levaquin 750 mg daily on 08/31 and she completed a 14 day course of antibiotic therapy    Patient afebrile, leukocytosis secondary to steroid use however this is trending down

## 2019-09-15 NOTE — NURSING NOTE
Dr Primo Muhammad in to talk with patient with amrita primary rn and myself in room  Examined by doctor  Doe nuno explained to patient plan for follow up and she is discharged  Patient upset and angry,  Argumentative with myself and doctor  States she is not leaving and is appealing the discharge  I explained to patient she has the right to appeal the discharge however if declined she may be responsible for the hospital bill  She states no she will not be the doctor will have to pay it    I told patient I would contact the  working today to complete the appeal paperwork

## 2019-09-15 NOTE — PROGRESS NOTES
Hospital supervisor exited room after speaking with pt and pt stated she used her epi pen from home  Will monitor vitals and made MD aware

## 2019-09-15 NOTE — PROGRESS NOTES
Pt refused to take Prednisone at this time, stating she still felt like she couldn't swallow the PRN dose of Tramadol that she took earlier  Pt was agreeable to taking Prednisone along with her Zantac and Protonix around 1100 and then take her Benadryl dose when it was due around 1300

## 2019-09-15 NOTE — QUICK NOTE
S)  Rec'd page for patient requesting benadryl to be increased from benadryl 50mg q 6h IV prn to 50mg q 4h prn IV prn in patient on IV dilaudid for ulcerative colitis exacerbation   Chart reviewed, meds reviewed  Discussed w/RN Chante Thompson  Pt reports that she is upset because 'no one discussed with me about changing the benadryl to 50mg IV q 6 hours '  D/w pt her flare symptoms which she reports are due to 'corn fillers in oral medications' which cause s/sx of difficulty breathing, throat swelling sensation/choking sensation  O) vital signs reviewed and are stable  General: anxious appearing and intermittently agitated, able to speak in full sentences at different volumes (when agitated)  HEENT:   Head normocephalic  Eyes: conjunctiva normal  Mouth: no edema of tongue, no posterior pharyngeal edema or edema of soft palate or sublingually  No muffled speech  Throat: No stridor on auscultation  Cardiac: Regular rate rhythm w/o m/r/g  Lungs: CTA b/l w/full respiratory excursion and no r/w/r  Pt able to talk in full sentences while shouting regarding feeling sob  Vascular: radial pulse 2+ b/l  Skin: no peripheral edema of UE/LE b/l  Excoriation noted on anterior chest circumferential around base of neck  no urticaria or drug rash  A)     Sob/neck tightness   No objective data to demonstrate urticaria, angioedema or anaphylaxis most critically  Continue to monitor vs q 4h  If demonstrates sign of airway compromise or objective urticaria/angioedema will have low threshold for escalating mast cell activation syndrome regimen    UC flare w/rectal bleeding   Continue IV narcotic regimen as is w/dilaudid for severe pain and tramadol for moderate pain   Continue iv steroids at 20mg q8 h as outlined by GI PA with anticipation to transition to oral steroids      Mast cell activation syndrome   D/w pt at length regarding half life of meds, dispo d/c planning as discussed with her with attending    At that time given no evidence of objective evidence of urticaria or angioedema, there is no justification to increase IV benadryl frequency given acceptable half life of 3 4-9 hours, but that we will continue to monitor closely for signs of airway  Continue IV benadryl 50mg q 6 h prn for now  Continue zantac 300mg, cetirizine, solumedrol all of which will provide suppression of mast cell system  D/w Nurse/charge nurse and hospital supervisor  D/w attending remotely

## 2019-09-15 NOTE — NURSING NOTE
Called by amrita primary rn to talk with patient regarding discharge planning  Explained and confirmed with patient dr Mars Armstrong spoke with her earlier today about discharge  Patient states she feels like she is having a reaction to prednisone taken earlier because it has corn in it  Scheduled benadryl was given after the prednisone  resp easy  vss as noted  No distress noted  Patient aware per md she is clear and stable for discharge    Patient asking to talk with dr Gildardo Grey,  Paged and made aware at [phone]

## 2019-09-16 ENCOUNTER — TELEPHONE (OUTPATIENT)
Dept: GASTROENTEROLOGY | Facility: HOSPITAL | Age: 44
End: 2019-09-16

## 2019-09-16 PROBLEM — R78.81 BACTEREMIA DUE TO GRAM-NEGATIVE BACTERIA: Status: RESOLVED | Noted: 2019-08-27 | Resolved: 2019-09-16

## 2019-09-16 PROBLEM — R65.20 SEVERE SEPSIS (HCC): Status: RESOLVED | Noted: 2019-08-26 | Resolved: 2019-09-16

## 2019-09-16 PROBLEM — A41.9 SEVERE SEPSIS (HCC): Status: RESOLVED | Noted: 2019-08-26 | Resolved: 2019-09-16

## 2019-09-16 LAB
BACTERIA BLD CULT: NORMAL
BACTERIA BLD CULT: NORMAL
ELASTASE PANC STL-MCNT: >500 UG ELAST./G
ENA SS-A AB SER-ACNC: <0.2 AI (ref 0–0.9)
ENA SS-B AB SER-ACNC: <0.2 AI (ref 0–0.9)

## 2019-09-16 PROCEDURE — 83520 IMMUNOASSAY QUANT NOS NONAB: CPT | Performed by: INTERNAL MEDICINE

## 2019-09-16 PROCEDURE — 99233 SBSQ HOSP IP/OBS HIGH 50: CPT | Performed by: INTERNAL MEDICINE

## 2019-09-16 RX ORDER — DIPHENHYDRAMINE HYDROCHLORIDE 50 MG/ML
50 INJECTION INTRAMUSCULAR; INTRAVENOUS EVERY 4 HOURS PRN
Status: DISCONTINUED | OUTPATIENT
Start: 2019-09-16 | End: 2019-09-17 | Stop reason: HOSPADM

## 2019-09-16 RX ADMIN — DIPHENHYDRAMINE HYDROCHLORIDE 50 MG: 50 INJECTION, SOLUTION INTRAMUSCULAR; INTRAVENOUS at 22:03

## 2019-09-16 RX ADMIN — TRAMADOL HYDROCHLORIDE 50 MG: 50 TABLET, FILM COATED ORAL at 03:17

## 2019-09-16 RX ADMIN — PREDNISONE 40 MG: 20 TABLET ORAL at 09:04

## 2019-09-16 RX ADMIN — LEVOTHYROXINE SODIUM 50 MCG: 50 TABLET ORAL at 03:21

## 2019-09-16 RX ADMIN — LORAZEPAM 1 MG: 1 TABLET ORAL at 17:58

## 2019-09-16 RX ADMIN — LORAZEPAM 1 MG: 1 TABLET ORAL at 09:01

## 2019-09-16 RX ADMIN — TRAMADOL HYDROCHLORIDE 50 MG: 50 TABLET, FILM COATED ORAL at 09:01

## 2019-09-16 RX ADMIN — NYSTATIN 500000 UNITS: 100000 SUSPENSION ORAL at 17:58

## 2019-09-16 RX ADMIN — RANITIDINE 300 MG: 150 TABLET ORAL at 12:00

## 2019-09-16 RX ADMIN — PANTOPRAZOLE SODIUM 40 MG: 40 TABLET, DELAYED RELEASE ORAL at 09:04

## 2019-09-16 RX ADMIN — TEMAZEPAM 30 MG: 15 CAPSULE ORAL at 22:02

## 2019-09-16 RX ADMIN — Medication 250 MG: at 17:58

## 2019-09-16 RX ADMIN — RANITIDINE 300 MG: 150 TABLET ORAL at 18:05

## 2019-09-16 RX ADMIN — DIPHENHYDRAMINE HYDROCHLORIDE 50 MG: 50 INJECTION, SOLUTION INTRAMUSCULAR; INTRAVENOUS at 13:57

## 2019-09-16 RX ADMIN — SODIUM CHLORIDE 75 ML/HR: 0.9 INJECTION, SOLUTION INTRAVENOUS at 08:42

## 2019-09-16 RX ADMIN — Medication 5 SPRAY: at 09:00

## 2019-09-16 RX ADMIN — METOCLOPRAMIDE 5 MG: 5 INJECTION, SOLUTION INTRAMUSCULAR; INTRAVENOUS at 14:02

## 2019-09-16 RX ADMIN — NYSTATIN 500000 UNITS: 100000 SUSPENSION ORAL at 09:02

## 2019-09-16 RX ADMIN — NYSTATIN 500000 UNITS: 100000 SUSPENSION ORAL at 22:02

## 2019-09-16 RX ADMIN — PANTOPRAZOLE SODIUM 40 MG: 40 TABLET, DELAYED RELEASE ORAL at 17:58

## 2019-09-16 RX ADMIN — DIPHENHYDRAMINE HYDROCHLORIDE 50 MG: 50 INJECTION, SOLUTION INTRAMUSCULAR; INTRAVENOUS at 09:05

## 2019-09-16 RX ADMIN — TRAMADOL HYDROCHLORIDE 50 MG: 50 TABLET, FILM COATED ORAL at 22:03

## 2019-09-16 RX ADMIN — MONTELUKAST SODIUM 10 MG: 10 TABLET, COATED ORAL at 22:07

## 2019-09-16 RX ADMIN — METOCLOPRAMIDE 5 MG: 5 INJECTION, SOLUTION INTRAMUSCULAR; INTRAVENOUS at 09:04

## 2019-09-16 RX ADMIN — Medication 5 SPRAY: at 22:05

## 2019-09-16 RX ADMIN — METOCLOPRAMIDE 5 MG: 5 INJECTION, SOLUTION INTRAMUSCULAR; INTRAVENOUS at 17:58

## 2019-09-16 RX ADMIN — ENOXAPARIN SODIUM 40 MG: 40 INJECTION SUBCUTANEOUS at 09:07

## 2019-09-16 RX ADMIN — DIPHENHYDRAMINE HYDROCHLORIDE 50 MG: 50 INJECTION, SOLUTION INTRAMUSCULAR; INTRAVENOUS at 17:58

## 2019-09-16 RX ADMIN — RANITIDINE 300 MG: 150 TABLET ORAL at 09:02

## 2019-09-16 RX ADMIN — PANTOPRAZOLE SODIUM 40 MG: 40 TABLET, DELAYED RELEASE ORAL at 14:55

## 2019-09-16 RX ADMIN — Medication 5 SPRAY: at 17:08

## 2019-09-16 RX ADMIN — Medication 250 MG: at 09:03

## 2019-09-16 RX ADMIN — DIPHENHYDRAMINE HYDROCHLORIDE 50 MG: 50 INJECTION, SOLUTION INTRAMUSCULAR; INTRAVENOUS at 03:17

## 2019-09-16 RX ADMIN — LIDOCAINE 2 PATCH: 50 PATCH TOPICAL at 09:04

## 2019-09-16 RX ADMIN — METOCLOPRAMIDE 5 MG: 5 INJECTION, SOLUTION INTRAMUSCULAR; INTRAVENOUS at 22:03

## 2019-09-16 NOTE — QUICK NOTE
Received a call from Dr Xenia Arroyo, pt's [facility] allergist/specialist ([phone])    Recommends IV fluids, iv benadryl and measuring tripase levels and 24h urine for methylhistamine and Prostagladin      -triptase already ordered   -unable to order 24h urine for methylhistamine and PG:  Called lab- they are unable to perform or process or send out this lab to [facility]

## 2019-09-16 NOTE — TELEPHONE ENCOUNTER
Attempted to contact patient  No answer  Mailbox was full     ----- Message from Hina Manning PA-C sent at 9/15/2019  3:01 PM EDT -----  Please schedule hospital follow-up in 2 weeks with Dr Gloria Sanchez  If Dr Gloria Sanchez is unavailable at the [facility], please ask if she is willing to come to the [facility]

## 2019-09-16 NOTE — PROGRESS NOTES
Pharmacy called to inform me that the patients home medication Zyrtec was no longer available and that she will  need to bring more in from home if she will still be here  Patient informed and said her  will be bringing with him

## 2019-09-16 NOTE — SOCIAL WORK
ELISABETH called [facility] this morning to follow up regarding Medicare appeal   Case reference number: [id number]  ELISABETH spoke with Janet Reveles at [facility] and confirmed they received the medical records which were sent yesterday afternoon  The case is currently under review  [place] receive a determination from [facility] with their decision later today or by tomorrow piror to 12 PM   CM following

## 2019-09-16 NOTE — PROGRESS NOTES
Patient is requesting to have her benadryl changed from 6 hours to 4 hours , yesterday Dr Burgess Desai changed it for her to Q4 and changed it back to 6 hours  I informed her provider of her request and she is not willing to change this order  Dr Irma Marquis told me to tell her she can wait till her next dose and then eat ,or she can try her lunch without the benadryl and possibly be discharged this afternoon

## 2019-09-16 NOTE — SOCIAL WORK
CM met with pt at bedside to discuss discharge needs  Pt reports that she lives with her  and children in a house  ADL's are completed mostly independently   is able to assist as needed  Pt does not use DME; reports she has a small home and is able to lean on things  PCP identified as Dr Dilcia Gonzales  Pharmacy identified as [facility]  Pt does drive and will have a ride home at D/C  Pt denies VNA/STR hx  Pt denied having a POA  Pt had appealed her discharge on Sunday  Karla French with Case Management had faxed all clinicals to [facility]  At this time, we have not heard back with a decision  Pt reports that she appealed her discharge for "they were withholding medications from me"  Pt reports that she takes benadryl every four hours and the physician did not have it prescribed this way  Pt also reported that her solumedrol was not changed to p o like it was supposed to  Pt stated that she told nursing that she was unable to breathe for three hours and resorted to using her epi-pen  CM spoke with nursing who reported when they checked on pt at that time, she had no signs of distress or being SOB  Pt is very anxious  She reported that she will not leave until she is able to eat and keep food down  She reports she is unable to because her medications are not being ordered correctly  CM continuing to follow for discharge needs  Will advise of appeal decision once Neha contacts case management

## 2019-09-16 NOTE — PROGRESS NOTES
Progress Note - Elisabet Ch 53 y o  female MRN: [id number]    Unit/Bed#: E2 -01 Encounter: [id number]      Assessment/Plan:  1-odynophagia:  Patient presented complaining of worsening odynophagia which she attributes to a tonsillectomy in April 2019  Patient was evaluated by the GI and ENT services  GI team performed an upper endoscopy without any significant structural abnormalities, or evidence of infection that would account for her symptoms  ENT consultation did not note any acute infection or acute abnormalities at this time  Possibly secondary to chronic dehydration  Continue to encourage oral hydration   -Magic mouthwash/viscous lidocaine/topical analgesics as needed      2-dysphagia:  Patient notes dysphagia that she felt has been progressively worsened since a tonsillectomy in April 2019  Patient was evaluated by the GI and ENT services  She had an upper endoscopy, and a video barium swallow without significant abnormalities  -I personally discussed her case with the GI team who notes that they do not recommend any additional testing at this time  She had an EGD noted 8/30 with abnormal mucosa in the esophagus  A mild mucosal ring in the proximal and mid esophagus  Per GI, these are not likely account for her symptoms  The biopsies did not reveal any significant abnormality  Biopsies not reveal any Candida  Visual inspection did not reveal any yeast infection  -patient had a video fluoroscopy under the guidance of the speech therapy team   Patient did not have any significant abnormality on her feet heel fluoroscopy  She had a small amount of stasis in the esophagus  Speech therapy recommended a regular diet with thin liquids, and patient and moisture/condiments as needed    -continue supportive measures, oral hydration     -panel for Sjogren's sent:  Outpatient follow-up      3-status post gram-negative kika sepsis:  Patient was previously admitted to [address] [facility] with severe sepsis, present on admission, due to gram-negative bacteremia with 1/2 sets positive for citrobacter  She was treated with a course of IV cefepime, and under the guidance of the infectious disease consultant, transition to p o  Levaquin, and completed a 14 day course of antibiotics  4-mild protein calorie malnutrition:  Continue to encourage oral supplements  Outpatient follow-up    5-left axillary lymph nodes:  Patient had a left axillary ultrasound performed at [address] [address]   There was no evidence of suspicious mass or fluid collection  One axillary node was mildly prominent in size, with normal intrinsic morphology  -outpatient follow-up with PCP, clinical surveillance    6-Mast cell activation syndrome:  Patient follows up at [facility] plus the Allergist/MCAS specialist Dr Josse Fung  On chronic Remicade as well as receives IVIG  -I reviewed her notes from her allergist Dr Josse Fung:  notes that patient had been on Xolair since July of 2015 every 28 days which help with the hives and food tolerance but did not improve her headaches, fatigue, and heat intolerance  She is noted to have associated abdominal pain, diarrhea, and dizziness  It is noted she had anaphylactic reactions to Imuran, Elavil, and Imitrex  She has been prescribed an EpiPen as well  It is noted that her Remicade helps with her GI episodes and she was on nystatin  Is notable she has a normal baseline tryptase   -recommendations were that patient should be treated with a 1 L of normal saline monthly, destabilized her MCA S  At that time it was recommended that she start IV IG if she tested positive for immunodeficiency    Ketotifen was recommended for symptoms of flushing, itching and mast cell activation at 1mg BID   -will check tryptase and histamine level at this time      -Pt notes she feels as if she is having a flare with fatigue and prior difficulty swallowing which as improved after IV Benadryl  She relates at home she takes Benadryl 75 mg p r n , as well as Atarax 50 mg if not improved    7-ulcerative colitis:  I reviewed the GI note:  Patient has a history of ulcerative colitis, and follows with the GI team at [facility]  She is on Remicade every 6 weeks  -patient underwent colonoscopy on 08/30 that revealed moderately severe proctosigmoiditis, 2 linear ulcers noted in the mid rectum  Biopsies revealed acute and chronic colitis with evidence of active inflammatory bowel disease    -patient was started on IV steroids with improvement   -cont po prednisone    8-hypothyroidism:  Continue Synthroid    9-family: updated  Dm Weathers via phone  D/w pt's nurse at bedside  D/w   D/w GI: Yajaira Landeros PA-C      VTE Pharmacologic Prophylaxis: Enoxaparin (Lovenox)  VTE Mechanical Prophylaxis: sequential compression device    Certification Statement: The patient will continue to require additional inpatient hospital stay due to need for further acute intervention for dysphagia    Status: inpatient     Total time spent today including interview, exam, review of dc summary from [facility], GI notes, radiology reports, lab reports, outpt allergy consultant's notes: 50 min    Pt gives me permission to speak with her outpt specialist     ==============================================================    Subjective:  Patient relates that she feels as though she is having a flare of her "mast cell" disease  She notes she feels as though she has swelling in her throat  She relates she is having difficulty swallowing  She notes she attributes this to the timing of her IV Benadryl  Patient relates she usually controls her flares at home by using Benadryl  She states she takes 75 mg of Benadryl every 4 hours, and if this does not improve her symptoms she then takes an additional 50 mg of Atarax  Patient notes today she has been tolerating liquids, but not solids    She notes she feels her swallowing has not improved  Patient notes she had some nausea earlier which has improved  She denies any vomiting  She denies any diarrhea or constipation  No bloody bowel movements  She denies any shortness of breath  She ambulated to the restroom and back  No complaints of dizziness or lightheadedness  Patient relates that she did not wish to be transferred to the [facility] from the [facility]  Physical Exam:   Temp:  [97 6 °F (36 4 °C)-98 6 °F (37 °C)] 98 1 °F (36 7 °C)  HR:  [81-93] 81  Resp:  [18-20] 20  BP: ()/(70-76) 100/72    Gen:  Pleasant, non-tachypnic, non-dyspnic  Conversant  Sitting up in bed  Noting to be ambulating from the restroom and back with steady gait, guiding her IV pole  Heart: regular rate and rhythm, S1S2 present, no murmur, rub or gallop  Lungs: clear to ausculatation bilaterally  No wheezing, crackles, or rhonchi  No accessory muscle use or respiratory distress  Abd: soft, non-tender, non-distended  NABS, no guarding, rebound or peritoneal signs  Extremities: no clubbing, cyanosis or edema  2+pedal pulses bilaterally  Full range of motion  Neuro: awake, alert  Fluent and goal directed speech  Skin: warm and dry: no petechiae, purpura and rash  LABS:   Results from last 7 days   Lab Units 09/14/19  0607 09/13/19  0511 09/12/19  0540   WBC Thousand/uL 16 57* 12 26* 16 64*   HEMOGLOBIN g/dL 13 1 14 1 14 6   HEMATOCRIT % 40 7 42 9 43 5   PLATELETS Thousands/uL 382 376 388     Results from last 7 days   Lab Units 09/14/19  0607 09/13/19  0511 09/12/19  0540   POTASSIUM mmol/L 4 2 4 4 4 2   CHLORIDE mmol/L 102 101 100   CO2 mmol/L 28 29 29   BUN mg/dL 10 9 11   CREATININE mg/dL 0 66 0 69 0 73   CALCIUM mg/dL 8 5 8 5 8 6                   ---------------------------------------------------------------------------------------------------------------  This note has been constructed using a voice recognition system

## 2019-09-17 ENCOUNTER — TRANSCRIBE ORDERS (OUTPATIENT)
Dept: LAB | Facility: HOSPITAL | Age: 44
End: 2019-09-17

## 2019-09-17 VITALS
HEART RATE: 81 BPM | WEIGHT: 127.43 LBS | TEMPERATURE: 98 F | RESPIRATION RATE: 16 BRPM | OXYGEN SATURATION: 98 % | BODY MASS INDEX: 21.2 KG/M2 | DIASTOLIC BLOOD PRESSURE: 72 MMHG | SYSTOLIC BLOOD PRESSURE: 111 MMHG

## 2019-09-17 PROBLEM — R13.10 DYSPHAGIA: Status: RESOLVED | Noted: 2019-09-12 | Resolved: 2019-09-17

## 2019-09-17 PROBLEM — R13.10 ODYNOPHAGIA: Status: RESOLVED | Noted: 2019-09-12 | Resolved: 2019-09-17

## 2019-09-17 PROCEDURE — 99239 HOSP IP/OBS DSCHRG MGMT >30: CPT | Performed by: INTERNAL MEDICINE

## 2019-09-17 RX ORDER — PREDNISONE 10 MG/1
TABLET ORAL
Qty: 101 TABLET | Refills: 0 | Status: SHIPPED | OUTPATIENT
Start: 2019-09-17 | End: 2020-01-13 | Stop reason: SDUPTHER

## 2019-09-17 RX ADMIN — POLYETHYLENE GLYCOL 3350 17 G: 17 POWDER, FOR SOLUTION ORAL at 10:16

## 2019-09-17 RX ADMIN — DIPHENHYDRAMINE HYDROCHLORIDE 50 MG: 50 INJECTION, SOLUTION INTRAMUSCULAR; INTRAVENOUS at 10:12

## 2019-09-17 RX ADMIN — DIPHENHYDRAMINE HYDROCHLORIDE 50 MG: 50 INJECTION, SOLUTION INTRAMUSCULAR; INTRAVENOUS at 14:17

## 2019-09-17 RX ADMIN — LEVOTHYROXINE SODIUM 50 MCG: 50 TABLET ORAL at 10:12

## 2019-09-17 RX ADMIN — NYSTATIN 500000 UNITS: 100000 SUSPENSION ORAL at 14:22

## 2019-09-17 RX ADMIN — MONTELUKAST SODIUM 10 MG: 10 TABLET, COATED ORAL at 10:11

## 2019-09-17 RX ADMIN — DIPHENHYDRAMINE HYDROCHLORIDE 50 MG: 50 INJECTION, SOLUTION INTRAMUSCULAR; INTRAVENOUS at 06:19

## 2019-09-17 RX ADMIN — PANTOPRAZOLE SODIUM 40 MG: 40 TABLET, DELAYED RELEASE ORAL at 10:11

## 2019-09-17 RX ADMIN — Medication 5 SPRAY: at 10:14

## 2019-09-17 RX ADMIN — LORAZEPAM 1 MG: 1 TABLET ORAL at 14:17

## 2019-09-17 RX ADMIN — Medication 250 MG: at 10:10

## 2019-09-17 RX ADMIN — SODIUM CHLORIDE 100 ML/HR: 0.9 INJECTION, SOLUTION INTRAVENOUS at 06:19

## 2019-09-17 RX ADMIN — DIPHENHYDRAMINE HYDROCHLORIDE 50 MG: 50 INJECTION, SOLUTION INTRAMUSCULAR; INTRAVENOUS at 02:05

## 2019-09-17 RX ADMIN — RANITIDINE 300 MG: 150 TABLET ORAL at 10:17

## 2019-09-17 RX ADMIN — BUTALBITAL, ACETAMINOPHEN AND CAFFEINE 1 TABLET: 50; 325; 40 TABLET ORAL at 02:05

## 2019-09-17 RX ADMIN — NYSTATIN 500000 UNITS: 100000 SUSPENSION ORAL at 10:10

## 2019-09-17 RX ADMIN — PREDNISONE 40 MG: 20 TABLET ORAL at 10:11

## 2019-09-17 RX ADMIN — ENOXAPARIN SODIUM 40 MG: 40 INJECTION SUBCUTANEOUS at 10:10

## 2019-09-17 RX ADMIN — METOCLOPRAMIDE 5 MG: 5 INJECTION, SOLUTION INTRAMUSCULAR; INTRAVENOUS at 10:12

## 2019-09-17 RX ADMIN — TRAMADOL HYDROCHLORIDE 50 MG: 50 TABLET, FILM COATED ORAL at 10:16

## 2019-09-17 RX ADMIN — LIDOCAINE 2 PATCH: 50 PATCH TOPICAL at 10:12

## 2019-09-17 RX ADMIN — METOCLOPRAMIDE 5 MG: 5 INJECTION, SOLUTION INTRAMUSCULAR; INTRAVENOUS at 14:22

## 2019-09-17 NOTE — PROGRESS NOTES
Guillermo Turner from the [place] lab called to inform me that the Histamine draw that was ordered yesterday was sent over in the wrong tube, notified provider and was told we do not need to redraw at this time

## 2019-09-17 NOTE — DISCHARGE INSTRUCTIONS
Discharge instructions      Please continue your medication as directed: Will be discharged home on prednisone 40 mg every daily for the next 7 days  Then please decreased to 35 mg daily for the next week, then 30 mg daily for the following week, then 25 mg daily for the following week, and then 20 mg daily every day after that, until you see your GI team   Please do not stop this medication, or cut it down further, until speaking with the GI team   Please call their office for all refills  Please see your allergy doctor as soon as possible for a checkup      Please continue your Benadryl 50 mg every 4 hours as needed for symptoms of your mast cell flare, as directed by your allergy specialist     Please return to the ER with any problems at all, especially any fevers, chills, nausea, vomiting, diarrhea, difficulty breathing, dizziness, lightheadedness, or any other problems at all

## 2019-09-17 NOTE — DISCHARGE SUMMARY
Discharge Summary - Medical Fred Eastern 53 y o  female MRN: [id number]    [address] [address] MED SURG Room / Bed: [facility] /E2 -* Encounter: [id number]    BRIEF OVERVIEW  Patient was admitted to [facility] from 09/12-9/17 as a transfer from [address] [place] [address]   She had a prolonged hospital course  For complete details please refer to the daily progress notes  Please also refer to the discharge summary from her hospitalization at [address] [place] [address] from 08/26- 09/12/2019    Admission Date: 9/12/2019       Discharge Date:  09/17/2019    Primary Diagnoses  Principal Problem:    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[facility] [address] )  Active Problems:    Exacerbation of ulcerative colitis with rectal bleeding (HCC)    Vitamin D deficiency    Hypothyroidism    Immunosuppressed status ([facility] [address] )    Mass of left axilla    Mild protein-calorie malnutrition ([facility] [address] )    Odynophagia    Dysphagia    Drug-seeking behavior  Resolved Problems:    Severe sepsis ([facility] [address] )    Bacteremia due to Gram-negative bacteria      Service:  [facility], Dr [facility]      Consulting Providers   GI:  Dr Abby Clement and associates  ENT: Dr Filiberto Ralph    Procedures Performed none at [address]:  Please refer to EGD/colonoscopy at [facility] Studies:    Results from last 7 days   Lab Units 09/14/19  0607 09/13/19  0511 09/12/19  0540   WBC Thousand/uL 16 57* 12 26* 16 64*   HEMOGLOBIN g/dL 13 1 14 1 14 6   HEMATOCRIT % 40 7 42 9 43 5   PLATELETS Thousands/uL 382 376 388     Results from last 7 days   Lab Units 09/14/19  0607 09/13/19  0511 09/12/19  0540   POTASSIUM mmol/L 4 2 4 4 4 2   CHLORIDE mmol/L 102 101 100   CO2 mmol/L 28 29 29   BUN mg/dL 10 9 11   CREATININE mg/dL 0 66 0 69 0 73   CALCIUM mg/dL 8 5 8 5 8 6       History and Physical Exam:  Please refer to the Admission H&P note    Hospital Course by Problem  1-odynophagia:  Patient presented complaining of worsening odynophagia which she attributes to a tonsillectomy in April 2019  Patient was evaluated by the GI and ENT services  At [address] [address] the GI team performed an upper endoscopy without any significant structural abnormalities, or evidence of infection that would account for her symptoms  Biopsies did not reveal any Candida  ENT consultation did not note any acute infection or acute abnormalities at this time  Possibly secondary to chronic dehydration  Continue to encourage oral hydration             -patient notes she has improved  She is currently tolerating both liquids and solids today and requests discharge home  She notes the Benadryl helped her symptoms      2-dysphagia:  Patient notes dysphagia that she felt has been progressively worsened since a tonsillectomy in April 2019  Patient was evaluated by the GI and ENT services  She had an upper endoscopy, and a video barium swallow without significant abnormalities  -I personally discussed her case with the GI team who notes that they do not recommend any additional testing at this time  She had an EGD noted 8/30 with abnormal mucosa in the esophagus  A mild mucosal ring in the proximal and mid esophagus  Per GI, these are not likely account for her symptoms  The biopsies did not reveal any significant abnormality  Biopsies not reveal any Candida  Visual inspection did not reveal any yeast infection  -patient had a video fluoroscopy under the guidance of the speech therapy team   Patient did not have any significant abnormality on her feet heel fluoroscopy  She had a small amount of stasis in the esophagus  Speech therapy recommended a regular diet with thin liquids, and patient and moisture/condiments as needed               -continue supportive measures, oral hydration  Negative Sjogren's antibody              -patient notes her symptoms have improved    She is currently tolerating p o      3-status post gram-negative kika sepsis:  Patient was previously admitted to [address] [facility] with severe sepsis, present on admission, due to gram-negative bacteremia with 1/2 sets positive for citrobacter  She was treated with a course of IV cefepime, and under the guidance of the infectious disease consultant, transition to p o  Levaquin, and completed a 14 day course of antibiotics  Follow-up blood cultures were negative     4-mild protein calorie malnutrition:  Continue to encourage oral supplements  Outpatient follow-up     5-left axillary lymph nodes:  Patient had a left axillary ultrasound performed at [address] [facility]   There was no evidence of suspicious mass or fluid collection  One axillary node was mildly prominent in size, with normal intrinsic morphology  -outpatient follow-up with PCP, clinical surveillance     6-Mast cell activation syndrome:  Patient follows up at [facility] plus the Allergist/MCAS specialist Dr Marychuy Porras  On chronic Remicade as well as receives IVIG  -I reviewed her notes from her allergist Dr Marychuy Porras:  notes that patient had been on Xolair since July of 2015 every 28 days which help with the hives and food tolerance but did not improve her headaches, fatigue, and heat intolerance  She is noted to have associated abdominal pain, diarrhea, and dizziness  It is noted she had anaphylactic reactions to Imuran, Elavil, and Imitrex  She has been prescribed an EpiPen as well  It is noted that her Remicade helps with her GI episodes and she was on nystatin  Is notable she has a normal baseline tryptase              -outpatient recommendations were that patient should be treated with a 1 L of normal saline monthly, during flares of her MCA S  At that time it was recommended that she start IV IG if she tested positive for immunodeficiency    Ketotifen was recommended for symptoms of flushing, itching and mast cell activation at 1mg BID              -patient notes her symptoms improved with Benadryl    -tryptase levels were drawn during her hospitalization when patient's suspected she was having an acute flare:  She will follow up with Dr Kit Reza:  I spoke with Dr Kit Reza who requested 24h urine for progesterone and M-histamine  Unfortunately after prolonged discussion with our lab, and their research, these tests are not avalable in our [facility] lab or as a send out to [facility]   Pt will need to follow up with her [facility]      7-ulcerative colitis:  I reviewed the GI note:  Patient has a history of ulcerative colitis, and follows with the GI team at [facility]  She is on Remicade every 6 weeks  -patient underwent colonoscopy on 08/30 that revealed moderately severe proctosigmoiditis, 2 linear ulcers noted in the mid rectum  Biopsies revealed acute and chronic colitis with evidence of active inflammatory bowel disease               -patient was started on IV steroids with improvement in her symptoms  She is not currently having any diarrhea  She is tolerating p o  Patient was changed to oral prednisone 40 mg daily   -discussed the case with GI prior to discharge: Will discharged on 40 mg daily x1 week, and then decrease by 5 mg weekly until she gets to a maintenance of 20 mg daily, and is seen by the GI team   There is no indication to continue nystatin     8-hypothyroidism:  Continue Synthroid     9-family: updated  Dede Scheuermann via phone yesterday      D/w pt's nurse at bedside  D/w   D/w GI: Mack Lagunas PA-C        VTE Pharmacologic Prophylaxis: Enoxaparin (Lovenox)  VTE Mechanical Prophylaxis: sequential compression device    Discharge Condition: Improved  Discharge Disposition:  Home    Discharge Note and Physical Exam:   Patient notes she feels much better  She notes she is tolerating solids and liquids  She feels the Benadryl improves her swallowing  She denies any nausea or vomiting  She denies any diarrhea    She notes she feels as though she is going to pass a bowel movement today  She notes some slight right lower quadrant discomfort, however no other pain  Denies any shortness of breath or cough  She is ambulating without any complaints  She notes she feels improved and is requesting discharge home  Vitals:    09/17/19 0723   BP: 93/71   Pulse: 79   Resp: 17   Temp: 98 °F (36 7 °C)   SpO2: 98%     General:  Pleasant, non-tachypnic, non-dyspnic  Conversant  No facial swelling or edema noted  No neck/oral swelling or edema noted  Heart: Regular rate and rhythm, S1S2 present  No murmur, rub or gallop  Lungs: Clear to auscultation bilaterally, no wheezing, rhonchi, or crackles  Good air movement  No accessory muscle use or respiratory distress  Abdomen: soft, non-tender, non-distended, NABS  No rebound or guarding  No mass or peritoneal signs  Extremities: no clubbing, cyanosis or edema  2+ pedal pulses bilaterally  Neurologic:  Awake and alert  Fluent speech  Skin: warm and dry  No petechiae, purpura or rash  Discharge Medications   Please see Medical Reconciliation Discharge Form    Discharge Follow Up Appointments:   Anne Richards PA-C: 1 week  GI:  Abner: 2 weeks  Dr Francois Javier:  Pt to make appt asap    Discharge  Statement   Total Time Spent today including physical exam, discussion with patient and the GI team, and discharge arrangements/care = 34 minutes      This note has been constructed using a voice recognition system

## 2019-09-18 LAB — TRYPTASE SERPL-MCNC: 5.6 UG/L (ref 2.2–13.2)

## 2019-09-18 NOTE — SOCIAL WORK
CM received notice from [facility] reporting that they have agreed with our discharge plan and that pt would be responsible for payments after 12pm tomorrow  CM advised pt to call [facility] before she leaves the hospital as stated on the appeal paperwork       Appeal # [id number]

## 2019-09-20 ENCOUNTER — TELEPHONE (OUTPATIENT)
Dept: GASTROENTEROLOGY | Facility: CLINIC | Age: 44
End: 2019-09-20

## 2019-09-20 NOTE — TELEPHONE ENCOUNTER
----- Message from Harjeet Hutchinson MD sent at 9/12/2019 10:29 AM EDT -----  Regarding: Esophageal manometry and   Please schedule her for an esophageal manometry for dysphagia and a follow up appointment with Dr Jaswant Dunlap  If Dr Jaswant Dunlap is not available for more than a month, please schedule follow up with one of our PA's within a month and then with Dr Peter Peace (next available)   Thank you

## 2019-09-20 NOTE — TELEPHONE ENCOUNTER
Dr Aragon Drivers -   Would you like an EGD with manometry? Or pH/manometry? If it is the pH/manometry, we are not able to place the order  Please let me know and I will schedule accordingly

## 2019-09-24 ENCOUNTER — TELEPHONE (OUTPATIENT)
Dept: FAMILY MEDICINE CLINIC | Facility: CLINIC | Age: 44
End: 2019-09-24

## 2019-09-24 ENCOUNTER — CLINICAL SUPPORT (OUTPATIENT)
Dept: FAMILY MEDICINE CLINIC | Facility: CLINIC | Age: 44
End: 2019-09-24
Payer: COMMERCIAL

## 2019-09-24 DIAGNOSIS — E53.8 B12 DEFICIENCY: Primary | ICD-10-CM

## 2019-09-24 PROCEDURE — 96372 THER/PROPH/DIAG INJ SC/IM: CPT | Performed by: FAMILY MEDICINE

## 2019-09-24 RX ORDER — CYANOCOBALAMIN 1000 UG/ML
1000 INJECTION INTRAMUSCULAR; SUBCUTANEOUS
Status: DISCONTINUED | OUTPATIENT
Start: 2019-09-24 | End: 2021-12-07 | Stop reason: HOSPADM

## 2019-09-24 RX ADMIN — CYANOCOBALAMIN 1000 MCG: 1000 INJECTION INTRAMUSCULAR; SUBCUTANEOUS at 09:04

## 2019-09-30 ENCOUNTER — APPOINTMENT (EMERGENCY)
Dept: CT IMAGING | Facility: HOSPITAL | Age: 44
End: 2019-09-30
Payer: COMMERCIAL

## 2019-09-30 ENCOUNTER — APPOINTMENT (EMERGENCY)
Dept: RADIOLOGY | Facility: HOSPITAL | Age: 44
End: 2019-09-30
Payer: COMMERCIAL

## 2019-09-30 ENCOUNTER — HOSPITAL ENCOUNTER (EMERGENCY)
Facility: HOSPITAL | Age: 44
Discharge: ANOTHER HOSPITAL | End: 2019-09-30
Attending: EMERGENCY MEDICINE | Admitting: EMERGENCY MEDICINE
Payer: COMMERCIAL

## 2019-09-30 VITALS
RESPIRATION RATE: 16 BRPM | SYSTOLIC BLOOD PRESSURE: 96 MMHG | BODY MASS INDEX: 22.37 KG/M2 | OXYGEN SATURATION: 93 % | DIASTOLIC BLOOD PRESSURE: 55 MMHG | HEART RATE: 88 BPM | TEMPERATURE: 97.6 F | HEIGHT: 65 IN | WEIGHT: 134.26 LBS

## 2019-09-30 DIAGNOSIS — R29.818 TRANSIENT NEUROLOGIC DEFICIT: Primary | ICD-10-CM

## 2019-09-30 LAB
ALBUMIN SERPL BCP-MCNC: 3.7 G/DL (ref 3.5–5)
ALP SERPL-CCNC: 52 U/L (ref 46–116)
ALT SERPL W P-5'-P-CCNC: 38 U/L (ref 12–78)
AMPHETAMINES SERPL QL SCN: NEGATIVE
ANION GAP SERPL CALCULATED.3IONS-SCNC: 10 MMOL/L (ref 4–13)
AST SERPL W P-5'-P-CCNC: 19 U/L (ref 5–45)
BARBITURATES UR QL: NEGATIVE
BASE EX.OXY STD BLDV CALC-SCNC: 89 % (ref 60–80)
BASE EXCESS BLDV CALC-SCNC: 2.6 MMOL/L
BASOPHILS # BLD AUTO: 0.05 THOUSANDS/ΜL (ref 0–0.1)
BASOPHILS NFR BLD AUTO: 1 % (ref 0–1)
BENZODIAZ UR QL: NEGATIVE
BILIRUB SERPL-MCNC: 0.4 MG/DL (ref 0.2–1)
BILIRUB UR QL STRIP: NEGATIVE
BUN SERPL-MCNC: 8 MG/DL (ref 5–25)
CALCIUM SERPL-MCNC: 8.6 MG/DL (ref 8.3–10.1)
CHLORIDE SERPL-SCNC: 102 MMOL/L (ref 100–108)
CLARITY UR: CLEAR
CO2 SERPL-SCNC: 27 MMOL/L (ref 21–32)
COCAINE UR QL: NEGATIVE
COLOR UR: ABNORMAL
CREAT SERPL-MCNC: 0.7 MG/DL (ref 0.6–1.3)
EOSINOPHIL # BLD AUTO: 0.07 THOUSAND/ΜL (ref 0–0.61)
EOSINOPHIL NFR BLD AUTO: 1 % (ref 0–6)
ERYTHROCYTE [DISTWIDTH] IN BLOOD BY AUTOMATED COUNT: 13.6 % (ref 11.6–15.1)
GFR SERPL CREATININE-BSD FRML MDRD: 104 ML/MIN/1.73SQ M
GLUCOSE SERPL-MCNC: 89 MG/DL (ref 65–140)
GLUCOSE UR STRIP-MCNC: NEGATIVE MG/DL
HCO3 BLDV-SCNC: 24.5 MMOL/L (ref 24–30)
HCT VFR BLD AUTO: 43.5 % (ref 34.8–46.1)
HGB BLD-MCNC: 14.6 G/DL (ref 11.5–15.4)
HGB UR QL STRIP.AUTO: NEGATIVE
IMM GRANULOCYTES # BLD AUTO: 0.03 THOUSAND/UL (ref 0–0.2)
IMM GRANULOCYTES NFR BLD AUTO: 0 % (ref 0–2)
KETONES UR STRIP-MCNC: ABNORMAL MG/DL
LACTATE SERPL-SCNC: 2.5 MMOL/L (ref 0.5–2)
LEUKOCYTE ESTERASE UR QL STRIP: NEGATIVE
LYMPHOCYTES # BLD AUTO: 2.81 THOUSANDS/ΜL (ref 0.6–4.47)
LYMPHOCYTES NFR BLD AUTO: 41 % (ref 14–44)
MAGNESIUM SERPL-MCNC: 2 MG/DL (ref 1.6–2.6)
MCH RBC QN AUTO: 33 PG (ref 26.8–34.3)
MCHC RBC AUTO-ENTMCNC: 33.6 G/DL (ref 31.4–37.4)
MCV RBC AUTO: 98 FL (ref 82–98)
METHADONE UR QL: NEGATIVE
MONOCYTES # BLD AUTO: 0.47 THOUSAND/ΜL (ref 0.17–1.22)
MONOCYTES NFR BLD AUTO: 7 % (ref 4–12)
NEUTROPHILS # BLD AUTO: 3.47 THOUSANDS/ΜL (ref 1.85–7.62)
NEUTS SEG NFR BLD AUTO: 50 % (ref 43–75)
NITRITE UR QL STRIP: NEGATIVE
NRBC BLD AUTO-RTO: 0 /100 WBCS
O2 CT BLDV-SCNC: 19 ML/DL
OPIATES UR QL SCN: NEGATIVE
PCO2 BLDV: 30.5 MM HG (ref 42–50)
PCP UR QL: NEGATIVE
PH BLDV: 7.52 [PH] (ref 7.3–7.4)
PH UR STRIP.AUTO: 8 [PH]
PLATELET # BLD AUTO: 297 THOUSANDS/UL (ref 149–390)
PMV BLD AUTO: 9.7 FL (ref 8.9–12.7)
PO2 BLDV: 49 MM HG (ref 35–45)
POTASSIUM SERPL-SCNC: 3.2 MMOL/L (ref 3.5–5.3)
PROT SERPL-MCNC: 7.7 G/DL (ref 6.4–8.2)
PROT UR STRIP-MCNC: NEGATIVE MG/DL
RBC # BLD AUTO: 4.43 MILLION/UL (ref 3.81–5.12)
SODIUM SERPL-SCNC: 139 MMOL/L (ref 136–145)
SP GR UR STRIP.AUTO: 1.01 (ref 1–1.03)
THC UR QL: NEGATIVE
TSH SERPL DL<=0.05 MIU/L-ACNC: 2.79 UIU/ML (ref 0.36–3.74)
UROBILINOGEN UR QL STRIP.AUTO: 0.2 E.U./DL
WBC # BLD AUTO: 6.9 THOUSAND/UL (ref 4.31–10.16)

## 2019-09-30 PROCEDURE — 71045 X-RAY EXAM CHEST 1 VIEW: CPT

## 2019-09-30 PROCEDURE — 99284 EMERGENCY DEPT VISIT MOD MDM: CPT | Performed by: EMERGENCY MEDICINE

## 2019-09-30 PROCEDURE — 85025 COMPLETE CBC W/AUTO DIFF WBC: CPT | Performed by: EMERGENCY MEDICINE

## 2019-09-30 PROCEDURE — 80053 COMPREHEN METABOLIC PANEL: CPT | Performed by: EMERGENCY MEDICINE

## 2019-09-30 PROCEDURE — 83605 ASSAY OF LACTIC ACID: CPT | Performed by: EMERGENCY MEDICINE

## 2019-09-30 PROCEDURE — 93005 ELECTROCARDIOGRAM TRACING: CPT

## 2019-09-30 PROCEDURE — 84443 ASSAY THYROID STIM HORMONE: CPT | Performed by: EMERGENCY MEDICINE

## 2019-09-30 PROCEDURE — 99285 EMERGENCY DEPT VISIT HI MDM: CPT

## 2019-09-30 PROCEDURE — 80307 DRUG TEST PRSMV CHEM ANLYZR: CPT | Performed by: EMERGENCY MEDICINE

## 2019-09-30 PROCEDURE — 82805 BLOOD GASES W/O2 SATURATION: CPT | Performed by: EMERGENCY MEDICINE

## 2019-09-30 PROCEDURE — 96376 TX/PRO/DX INJ SAME DRUG ADON: CPT

## 2019-09-30 PROCEDURE — 96374 THER/PROPH/DIAG INJ IV PUSH: CPT

## 2019-09-30 PROCEDURE — 81003 URINALYSIS AUTO W/O SCOPE: CPT | Performed by: EMERGENCY MEDICINE

## 2019-09-30 PROCEDURE — 36415 COLL VENOUS BLD VENIPUNCTURE: CPT | Performed by: EMERGENCY MEDICINE

## 2019-09-30 PROCEDURE — 83735 ASSAY OF MAGNESIUM: CPT | Performed by: EMERGENCY MEDICINE

## 2019-09-30 PROCEDURE — 70450 CT HEAD/BRAIN W/O DYE: CPT

## 2019-09-30 PROCEDURE — 96361 HYDRATE IV INFUSION ADD-ON: CPT

## 2019-09-30 RX ORDER — POTASSIUM CHLORIDE 20 MEQ/1
20 TABLET, EXTENDED RELEASE ORAL ONCE
Status: COMPLETED | OUTPATIENT
Start: 2019-09-30 | End: 2019-09-30

## 2019-09-30 RX ORDER — LORAZEPAM 2 MG/ML
0.5 INJECTION INTRAMUSCULAR ONCE
Status: COMPLETED | OUTPATIENT
Start: 2019-09-30 | End: 2019-09-30

## 2019-09-30 RX ORDER — LORAZEPAM 2 MG/ML
0.25 INJECTION INTRAMUSCULAR ONCE
Status: COMPLETED | OUTPATIENT
Start: 2019-09-30 | End: 2019-09-30

## 2019-09-30 RX ADMIN — SODIUM CHLORIDE 1000 ML: 0.9 INJECTION, SOLUTION INTRAVENOUS at 11:09

## 2019-09-30 RX ADMIN — LORAZEPAM 0.5 MG: 2 INJECTION INTRAMUSCULAR; INTRAVENOUS at 11:14

## 2019-09-30 RX ADMIN — POTASSIUM CHLORIDE 20 MEQ: 20 TABLET, EXTENDED RELEASE ORAL at 13:35

## 2019-09-30 RX ADMIN — SODIUM CHLORIDE 1000 ML: 0.9 INJECTION, SOLUTION INTRAVENOUS at 15:11

## 2019-09-30 RX ADMIN — LORAZEPAM 0.25 MG: 2 INJECTION INTRAMUSCULAR; INTRAVENOUS at 15:21

## 2019-09-30 NOTE — ED PROVIDER NOTES
History  Chief Complaint   Patient presents with    Anxiety     Pt felt loss of sensation in upper extremities, started Saturday, pt complains of being anxious     HPI   This is a 43-year-old female with past medical history of mast cell activation syndrome, ulcerative colitis, anxiety, presenting with episodes of numbness and tingling in bilateral upper extremities, as well as episodes of being awake but not being able to speak or move, with episodes lasting seconds to minutes  Symptoms started last Sunday, when patient returned home after being admitted at [facility] in [facility] with gram-negative sepsis due to Citrobacter  In the past week, she has had decreased appetite, head pressure radiating from forehead to the back of the head, episodes of tremors in upper extremities , as well as the episodes of not being able to move  She denies fevers but reports chills  She reports that she has been drinking well  She reports that she had an infusion of IV fluids during the week which she usually gets at [address] for hard mass cell activation syndrome   She denies anxiety   She reports intermittent headaches  Prior to Admission Medications   Prescriptions Last Dose Informant Patient Reported? Taking?    EPINEPHrine (EPIPEN 2-MARYSOL IJ)   Yes No   Sig: EpiPen   prn   Fexofenadine HCl (MUCINEX ALLERGY PO)   Yes No   Sig: Take by mouth   Probiotic Product (PROBIOTIC DAILY PO)   Yes No   Sig: Probiotic   floistar   butalbital-acetaminophen-caffeine (FIORICET,ESGIC) -40 mg per tablet   No No   Sig: Take 1 tablet by mouth every 8 (eight) hours as needed for headaches   cetirizine (ZyrTEC) 10 mg tablet   Yes No   Sig: Take 20 mg by mouth 2 (two) times a day    cholecalciferol (VITAMIN D) 400 units/mL   No No   Sig: Take 2 5 mL (1,000 Units total) by mouth daily   diphenhydrAMINE (BENADRYL) 25 mg tablet   Yes No   Sig: Take 50 mg by mouth as needed for itching    furosemide (LASIX) 20 mg tablet Yes No   Sig: Take 20 mg by mouth daily as needed    hydrOXYzine HCL (ATARAX) 25 mg tablet   Yes No   inFLIXimab (REMICADE IV)   Yes No   Si mg Every 6 weeks   levothyroxine (LEVOXYL) 50 mcg tablet   Yes No   Sig: Levoxyl 50 mcg tablet   montelukast (SINGULAIR) 10 mg tablet   No No   Sig: Take 1 tablet (10 mg total) by mouth 2 (two) times a day   omalizumab (XOLAIR) 150 mg   Yes No   Sig: Inject 300 mg under the skin every 28 days    ondansetron (ZOFRAN) 4 mg tablet   No No   Sig: Take 1 tablet (4 mg total) by mouth every 8 (eight) hours as needed for nausea or vomiting   pantoprazole (PROTONIX) 40 mg tablet   Yes No   Sig: Take 40 mg by mouth 2 (two) times a day    polyethylene glycol (MIRALAX) 17 g packet   No No   Sig: Take 17 g by mouth 2 (two) times a day   predniSONE 10 mg tablet   No No   Si tabs po daily x1 week, then 3&1/2 tab po qd x 1 week, then 3 tabs po qd x 1 week, then 2&1/2 tabs po qd x 1 week then 2 tabs qd afterwards   ranitidine (ZANTAC) 150 MG capsule   Yes No   Sig: Take 300 mg by mouth 2 (two) times a day    saccharomyces boulardii (FLORASTOR) 250 mg capsule   No No   Sig: Take 1 capsule (250 mg total) by mouth 2 (two) times a day   temazepam (RESTORIL) 30 mg capsule   No No   Sig: Take 1 capsule (30 mg total) by mouth daily at bedtime   traMADol (ULTRAM) 50 mg tablet   No No   Sig: Take 1 tablet (50 mg total) by mouth every 6 (six) hours as needed for moderate pain or severe pain   Patient not taking: Reported on 2019   traMADol (ULTRAM) 50 mg tablet   No No   Sig: Take 1 tablet (50 mg total) by mouth every 4 (four) hours as needed for moderate pain      Facility-Administered Medications Last Administration Doses Remaining   cyanocobalamin injection 1,000 mcg 2019  9:04 AM           Past Medical History:   Diagnosis Date    Anxiety     Asthma     Chronic kidney disease     Deep vein thrombosis (HCC)     Disease of thyroid gland     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    Generalized anxiety disorder 8/26/2017    Heart murmur     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HCC)     Migraine     Myocardial infarction [facility])     NSTEMI  pt denies, documented in cardio note    Pancreatitis     sphinger of     Psychiatric disorder     RA [facility]heumatoid arthritis) [facility] [address] [facility]     Ulcerative colitis ([facility] [address] )        Past Surgical History:   Procedure Laterality Date    APPENDECTOMY      CHOLECYSTECTOMY      EGD AND COLONOSCOPY N/A 9/12/2017    Procedure: EGD AND COLONOSCOPY;  Surgeon: Karuna Castle MD;  [facility]; Service: Gastroenterology    ERCP N/A 9/15/2017    Procedure: ENDOSCOPIC RETROGRADE CHOLANGIOPANCREATOGRAPHY (ERCP); Surgeon: Lelo Cano MD;  [facility];   Service: Gastroenterology    HYSTERECTOMY      KNEE SURGERY Right     LAPAROSCOPIC ENDOMETRIOSIS FULGURATION      ORIF ANKLE FRACTURE Left     SC KNEE SCOPE,MED/LAT MENISECTOMY Left 5/12/2017    Procedure: POSSIBLE MEDIAL MENISECTOMY;  Surgeon: Jeanine Romero MD;  [facility];  Service: Orthopedics    SC KNEE [address] CART Left 5/12/2017    Procedure: KNEE ARTHROSCOPY EVALUATION, CHONDROPLASTY;  Surgeon: Jeanine Romero MD;  [facility];  Service: Orthopedics    SC LAP,DIAGNOSTIC ABDOMEN N/A 12/12/2017    Procedure: LAPAROSCOPY DIAGNOSTIC  WITH LYSIS OF ADHESIONS;  Surgeon: Araceli Dow DO;  [facility];  Service: General       Family History   Problem Relation Age of Onset    Ulcerative colitis Mother     Heart failure Father     Neuropathy Father     Macular degeneration Father     Diabetes Father     Asthma Daughter     Hypothyroidism Daughter     Heart disease Maternal Grandmother     Arthritis Maternal Grandmother     Arthritis Paternal Grandmother     Macular degeneration Paternal Grandmother     Lymphoma Paternal Grandfather     Heart failure Paternal Aunt     Heart failure Paternal [de-identified]     Breast cancer Paternal Aunt 53    Breast cancer additional onset Paternal Aunt 62    Hypothyroidism Paternal Aunt     Breast cancer Maternal Aunt     No Known Problems Maternal Grandfather      I have reviewed and agree with the history as documented  Social History     Tobacco Use    Smoking status: Never Smoker    Smokeless tobacco: Never Used   Substance Use Topics    Alcohol use: Never     Frequency: Never     Binge frequency: Never    Drug use: Not Currently        Review of Systems   Constitutional: Positive for chills and fatigue  Negative for fever  HENT:        Difficulty swallowing, this is chronic   Respiratory: Negative for shortness of breath  Cardiovascular: Negative for chest pain  Gastrointestinal: Positive for nausea  Negative for abdominal pain and vomiting  Genitourinary: Negative for dysuria  Neurological: Positive for dizziness, speech difficulty, weakness and numbness  Psychiatric/Behavioral: Negative for hallucinations  Nervous/anxious: Has history of generalized anxiety disorder, denies feeling more anxious than usual lately  Physical Exam  Physical Exam   Constitutional: She appears well-developed  Thin, appears stated age, initially, tearful, following this physician around the room with eyes but not speaking or moving  Within seconds, this resolves, patient appears distressed and reports she has not been able to speak or move when the episode was ongoing  She is nontoxic appearing   HENT:   Head: Normocephalic and atraumatic  Mouth/Throat: No oropharyngeal exudate  Eyes: Pupils are equal, round, and reactive to light  Conjunctivae and EOM are normal  Right eye exhibits no discharge  Left eye exhibits discharge  Neck: Normal range of motion  Neck supple  Cardiovascular: Regular rhythm and normal pulses  No extrasystoles are present  Tachycardia present  Pulmonary/Chest: She has no wheezes  She has no rales  Intermittently tachypneic which coincides with being tearful   Abdominal: Soft   Bowel sounds are normal  There is no tenderness  There is no rebound  Musculoskeletal: She exhibits no edema or deformity  Neurological:   Awake, alert, oriented  During initial evaluation, patient following physician around the room a with eyes but not speaking or moving limbs  Subsequently, seconds later, patient is able to move and talk, her pupils are equal round and reactive to light  Her extraocular movements intact without nystagmus  Her face is symmetric  Her tongue protrudes midline  She has normal shoulder shrug  She has 5/5 strength in bilateral upper and lower extremities  There is mild tremor in bilateral upper extremities  Skin: Skin is warm and dry  Capillary refill takes less than 2 seconds  Psychiatric:   Somewhat restricted affect  Intermittently appears anxious  Becomes tearful during interview when talking about her health problems         Vital Signs  ED Triage Vitals [09/30/19 0954]   Temperature Pulse Respirations Blood Pressure SpO2   97 6 °F (36 4 °C) (!) 109 19 130/96 98 %      Temp Source Heart Rate Source Patient Position - Orthostatic VS BP Location FiO2 (%)   Temporal Monitor Lying Left arm --      Pain Score       No Pain           Vitals:    09/30/19 0954   BP: 130/96   Pulse: (!) 109   Patient Position - Orthostatic VS: Lying         Visual Acuity      ED Medications  Medications   LORazepam (ATIVAN) 2 mg/mL injection 0 5 mg (has no administration in time range)   sodium chloride 0 9 % bolus 1,000 mL (has no administration in time range)       Diagnostic Studies  Results Reviewed     Procedure Component Value Units Date/Time    Comprehensive metabolic panel [[id number]]     Lab Status:  No result Specimen:  Blood     CBC and differential [[id number]]     Lab Status:  No result Specimen:  Blood     TSH [[id number]]     Lab Status:  No result Specimen:  Blood     Lactic acid, plasma [[id number]]     Lab Status:  No result Specimen:  Blood     UA w Reflex to Microscopic w Reflex to Culture [[id number]]     Lab Status:  No result Specimen:  Urine, Clean Catch     Rapid drug screen, urine [[id number]]     Lab Status:  No result Specimen:  Urine     Blood gas, venous [[id number]]     Lab Status:  No result Specimen:  Blood                  CT head without contrast   Final Result by Trevon Estrada MD (09/30 1430)      No acute intracranial abnormality  Workstation performed: [id number]         XR chest 1 view portable   Final Result by Miki Golden MD (09/30 1414)      Normal examination  Workstation performed: [id number]                    Procedures  Procedures       ED Course        EKG:  Sinus tachycardia, ventricular rate 102, IN interval 116, QRS 76, , normal axis, wandering baseline makes interpretation limited, there are nonspecific ST/T-wave changes, namely T-wave inversions in leads V3, V4  No STEMI  No significant change from prior dated August 26, 2019  This is a 57-year-old female presenting with several complaints, most concerning of which is intermittent episodes of being unable to speak or move while remaining awake, without other symptoms including dizziness and decreased appetite  Vital signs reviewed, initially, tachycardic, within normal limits otherwise  Tachycardia corresponded to being tearful and not been able to move, although not been able to move resolved in a matter of seconds, whereas tachycardia improved following 1 L of normal saline  Differential diagnosis includes anxiety with hyperventilation and carpopedal spasm, a notion somewhat supported by patient's VBG drawn shortly after the episode of not being able to move, and revealing respiratory alkalosis with hypocarbia  Differential diagnosis also includes psychogenic seizure, seizure, sleep paralysis, limbic encephalitis, versus another etiology of symptoms  Besides a VBG, labs reveal hypokalemia with potassium of 3 2, this was repleted by mouth    Lactate is elevated at 2 5, patient over the receiving fluids for rehydration  CBC is unremarkable  UA not consistent with UTI  Urine drug screen is negative  CT head obtained, revealing no acute intracranial pathology  Patient is allergic to CT IV contrast dye, requires premedication for angiographic studies  At this time, I discussed the case with my internal medicine colleague, we agree that patient requires neurology evaluation  This is not available at [facility]  Since patient has previously received specialty care at [facility], I reached out to their call center and discussed the case with doctors Ana Abbott and [address] Wu  [address], neurologist on call, accepts patient for evaluation by Neurology in the emergency department  We will arrange for patient to be transferred by ALS for hemodynamic and neurologic monitoring  Patient had some stereotyped fluttering of eyelids and mouth twitching, I administered a low dose of ativan for possible seizure activity  Patient returns immediately back to baseline      MDM  Number of Diagnoses or Management Options  Transient neurologic deficit: new and requires workup     Amount and/or Complexity of Data Reviewed  Clinical lab tests: ordered and reviewed  Tests in the radiology section of CPT®: ordered and reviewed  Tests in the medicine section of CPT®: ordered and reviewed  Obtain history from someone other than the patient: yes  Discuss the patient with other providers: yes  Independent visualization of images, tracings, or specimens: yes    Risk of Complications, Morbidity, and/or Mortality  Presenting problems: high  Diagnostic procedures: high  Management options: high    Patient Progress  Patient progress: stable      Disposition  Final diagnoses:   Transient neurologic deficit     Time reflects when diagnosis was documented in both MDM as applicable and the Disposition within this note     Time User Action Codes Description Comment    9/30/2019  3:08 PM He Miller Add [R20 688] Transient neurologic deficit       ED Disposition     ED Disposition Condition Date/Time Comment    Transfer to Another Charisma Garcia Sep 30, 2019  3:08 PM Blake Edmonds should be transferred out to [facility]  MD Documentation      Most Recent Value   Patient Condition  The patient has been stabilized such that within reasonable medical probability, no material deterioration of the patient condition or the condition of the unborn child(mi) is likely to result from the transfer   Reason for Transfer  Level of Care needed not available at this facility [Neurology Evaluation]   Benefits of Transfer  Specialized equipment and/or services available at the receiving facility (Include comment)________________________   Risks of Transfer  Potential for delay in receiving treatment, Potential deterioration of medical condition, Loss of IV, Increased discomfort during transfer, Possible worsening of condition or death during transfer, Other: (Include comment)__________________________   Accepting Physician  Dr Emanuel Sheehan Name, Uilses Burrell   Sending MD Dr Ruben Kim      RN Documentation      [facility] Name, Ulises Burrell      Follow-up Information    None         Patient's Medications   Discharge Prescriptions    No medications on file     No discharge procedures on file      ED Provider  Electronically Signed by           Ganesh Degroot MD  09/30/19 0421

## 2019-09-30 NOTE — CASE MANAGEMENT
I called [employer] they stated there does not need to be a preauthorization done for an ambulance ride for a higher level of care  As long as its an emergent transfer

## 2019-09-30 NOTE — CASE MANAGEMENT
I self referred the do to her recent discharge from [facility] on 9/17/2019 to discuss resources that might be available to the patient  She stated she needs no help with resources  The patient lives in a [facility] home with her  and children  There is one flight of stairs to the basement  She has no medical equipment  She does not receive meals on wheels or home health services at this time  She takes care of her own ADL's and she drives  She uses [employer]  She has no issues getting or paying for her medications  She has [employer]  She has no issues getting or paying for her medicines  If the patient is discharged from the ED today her  will take her home

## 2019-09-30 NOTE — EMTALA/ACUTE CARE TRANSFER
[facility] EMERGENCY DEPARTMENT  [address] [place] [id number]  Dept: [address] [place] TRANSFER CONSENT    NAME Darrick Sandhu                                         1975                              MRN [id number]    I have been informed of my rights regarding examination, treatment, and transfer   by Dr Tony Gonzalez MD    Benefits: Specialized equipment and/or services available at the receiving facility (Include comment)________________________    Risks: Potential for delay in receiving treatment, Potential deterioration of medical condition, Loss of IV, Increased discomfort during transfer, Possible worsening of condition or death during transfer, Other: (Include comment)__________________________      Consent for Transfer:  I acknowledge that my medical condition has been evaluated and explained to me by the emergency department physician or other qualified medical person and/or my attending physician, who has recommended that I be transferred to the service of  Accepting Physician: Dr Guille Ty at [address] [place] [address] Name, [address] : [facility]  The above potential benefits of such transfer, the potential risks associated with such transfer, and the probable risks of not being transferred have been explained to me, and I fully understand them  The doctor has explained that, in my case, the benefits of transfer outweigh the risks  I agree to be transferred  I authorize the performance of emergency medical procedures and treatments upon me in both transit and upon arrival at the receiving facility  Additionally, I authorize the release of any and all medical records to the receiving facility and request they be transported with me, if possible  I understand that the safest mode of transportation during a medical emergency is an ambulance and that the Hospital advocates the use of this mode of transport   Risks of traveling to the receiving facility by car, including absence of medical control, life sustaining equipment, such as oxygen, and medical personnel has been explained to me and I fully understand them  (RONAL CORRECT BOX BELOW)  [  ]  I consent to the stated transfer and to be transported by ambulance/helicopter  [  ]  I consent to the stated transfer, but refuse transportation by ambulance and accept full responsibility for my transportation by car  I understand the risks of non-ambulance transfers and I exonerate the Hospital and its staff from any deterioration in my condition that results from this refusal     X___________________________________________    DATE  19  TIME________  Signature of patient or legally responsible individual signing on patient behalf           RELATIONSHIP TO PATIENT_________________________          Provider Certification    NAME Gina Hutton                                         1975                              MRN [id number]    A medical screening exam was performed on the above named patient  Based on the examination:    Condition Necessitating Transfer The encounter diagnosis was Transient neurologic deficit      Patient Condition: The patient has been stabilized such that within reasonable medical probability, no material deterioration of the patient condition or the condition of the unborn child(mi) is likely to result from the transfer    Reason for Transfer: Level of Care needed not available at this facility(Neurology Evaluation)    [facility] Requirements: [facility]   · Space available and qualified personnel available for treatment as acknowledged by    · Agreed to accept transfer and to provide appropriate medical treatment as acknowledged by       Dr Yan Crystal  · Appropriate medical records of the examination and treatment of the patient are provided at the time of transfer   [address] _______  · Transfer will be performed by qualified personnel from and appropriate transfer equipment as required, including the use of necessary and appropriate life support measures  Provider Certification: I have examined the patient and explained the following risks and benefits of being transferred/refusing transfer to the patient/family:         Based on these reasonable risks and benefits to the patient and/or the unborn child(mi), and based upon the information available at the time of the patients examination, I certify that the medical benefits reasonably to be expected from the provision of appropriate medical treatments at another medical facility outweigh the increasing risks, if any, to the individuals medical condition, and in the case of labor to the unborn child, from effecting the transfer      X____________________________________________ DATE 09/30/19        TIME_______      ORIGINAL - SEND TO MEDICAL RECORDS   COPY - SEND WITH PATIENT DURING TRANSFER

## 2019-10-01 ENCOUNTER — TRANSITIONAL CARE MANAGEMENT (OUTPATIENT)
Dept: FAMILY MEDICINE CLINIC | Facility: CLINIC | Age: 44
End: 2019-10-01

## 2019-10-01 DIAGNOSIS — F51.04 PSYCHOPHYSIOLOGICAL INSOMNIA: ICD-10-CM

## 2019-10-01 DIAGNOSIS — F41.9 ANXIETY: Primary | ICD-10-CM

## 2019-10-01 LAB
ATRIAL RATE: 103 BPM
P AXIS: 44 DEGREES
PR INTERVAL: 122 MS
QRS AXIS: 49 DEGREES
QRSD INTERVAL: 80 MS
QT INTERVAL: 356 MS
QTC INTERVAL: 466 MS
T WAVE AXIS: 24 DEGREES
VENTRICULAR RATE: 103 BPM

## 2019-10-01 PROCEDURE — 93010 ELECTROCARDIOGRAM REPORT: CPT | Performed by: INTERNAL MEDICINE

## 2019-10-01 RX ORDER — TEMAZEPAM 30 MG/1
30 CAPSULE ORAL
Qty: 30 CAPSULE | Refills: 0 | Status: SHIPPED | OUTPATIENT
Start: 2019-10-01 | End: 2019-11-01

## 2019-10-01 RX ORDER — LORAZEPAM 1 MG/1
1 TABLET ORAL EVERY 8 HOURS PRN
Qty: 21 TABLET | Refills: 0 | Status: SHIPPED | OUTPATIENT
Start: 2019-10-01 | End: 2020-01-13 | Stop reason: SDUPTHER

## 2019-10-03 ENCOUNTER — TELEPHONE (OUTPATIENT)
Dept: HEMATOLOGY ONCOLOGY | Facility: CLINIC | Age: 44
End: 2019-10-03

## 2019-10-03 DIAGNOSIS — D89.40 MAST CELL ACTIVATION SYNDROME (HCC): Primary | ICD-10-CM

## 2019-10-03 NOTE — TELEPHONE ENCOUNTER
Patient called in requesting the results for her mikaela 2-k blood test and wants to discuss her the rest of her labs  Please all patient back at [phone]

## 2019-10-04 NOTE — TELEPHONE ENCOUNTER
I spoke to patient by telephone:  She noted that she had been admitted to [facility], [address] [place] [address] August 26, 2019 to September 12, 2019 with severe sepsis secondary to gram-negative bacteremia  She received a 14 day antibiotic course during the hospitalization  Subsequently she was transferred to the [facility] and was discharged on September 17, 2019  The previously noted diffuse pruritus has abated with initiation of corticosteroids  She was discharged to home on September 17, 2019 on prednisone beginning at 40 mg daily, to be tapered by 5 mg per day each week down to 20 mg daily  She continues to receive IVIG infusions for management of mast cell activation syndrome administered at the [facility] at the direction of allergist and immunologist Dr Tia Garcia  Laboratory testing from September 25, 2019:  JAK2 V617F mutation is not detected, WBC 9 20, hemoglobin 13 2, platelets 351, on WBC differential neutrophils 85%, lymphs 10%, monos 4%, eos 1%,  (135-250), alk-phos 44, AST 19, ALT 33, bili 0 5, direct bili < 0 2, creatinine 0 57, total protein 7 4    CT of abdomen and pelvis without contrast from August 26, 2019: There is left pneumobilia in keeping with incompetent sphincter of Oddi, spleen, pancreas, adrenals are unremarkable, no abdominal pelvic lymphadenopathy, there is circumferential thickening of the rectum in keeping with nonspecific proctitis, no destructive osseous lesion  Portable chest x-ray from September 30, 2019:  Cardiomediastinal silhouette is unremarkable, lungs are clear    Noncontrast CT of the head from September 30, 2019:  No intracranial mass, signs of acute infarction, or acute parenchymal hemorrhage  There is lack of evidence of a primary bone marrow disorder such as a myeloproliferative disorder      Management of the mast cell activation syndrome will be left up to the expertise of  Dr Tia Garcia of the allergy and immune allergy service at the [facility]  Arrangements are to be made for a 2nd opinion hematology evaluation at the [facility]  The patient is aware seek medical attention for pain or swelling the legs, chest pain, shortness of breast, nose bleeds, easy bruising, recurrent pruritus, excessive fatigue, or if other new problems arise

## 2019-10-07 ENCOUNTER — OFFICE VISIT (OUTPATIENT)
Dept: GASTROENTEROLOGY | Facility: MEDICAL CENTER | Age: 44
End: 2019-10-07
Payer: COMMERCIAL

## 2019-10-07 VITALS
BODY MASS INDEX: 22.33 KG/M2 | DIASTOLIC BLOOD PRESSURE: 72 MMHG | HEART RATE: 98 BPM | HEIGHT: 65 IN | WEIGHT: 134 LBS | SYSTOLIC BLOOD PRESSURE: 108 MMHG

## 2019-10-07 DIAGNOSIS — K62.89 PROCTITIS: ICD-10-CM

## 2019-10-07 DIAGNOSIS — R13.10 DYSPHAGIA, UNSPECIFIED TYPE: ICD-10-CM

## 2019-10-07 DIAGNOSIS — K51.911 EXACERBATION OF ULCERATIVE COLITIS WITH RECTAL BLEEDING (HCC): Primary | ICD-10-CM

## 2019-10-07 PROCEDURE — 99244 OFF/OP CNSLTJ NEW/EST MOD 40: CPT | Performed by: INTERNAL MEDICINE

## 2019-10-07 RX ORDER — ASPIRIN 81 MG/1
81 TABLET, CHEWABLE ORAL 2 TIMES DAILY PRN
COMMUNITY
End: 2021-01-20

## 2019-10-07 NOTE — PROGRESS NOTES
[facility] - Outpatient Consultation  Sravani Judd 53 y o  female MRN: [id number]  Encounter: [id number]          ASSESSMENT AND PLAN:    Sravani Judd is a 53 y o  female who presents with complex medical history, including long-standing IBD/UC (for > 20 years), MCAS and possible immunodeficiency (for which she receives IVIG), multiple episodes of sepsis, cholecystectomy (2009), hypothyroidism who presents to establish outpatient care for UC  Her history is complex and her care has mostly been at [facility], the records of which are not yet reviewed given lack of availability  Recent available labs reviewed, as well as recent imaging, EGD, colonoscopy and path reports  She has stopped the Remicade, last dose over 2 months ago  She has an upcoming appointment at the [facility]    1  Exacerbation of ulcerative colitis with rectal bleeding ([address] )    2  Proctitis    3  Dysphagia, unspecified type        Orders Placed This Encounter   Procedures    Calprotectin,Fecal    C-reactive protein     -- Please have records scanned in so they may be reviewed  I explained that since there were not present prior to the visit, and given the quantity, it would be unrealistic to review all of her records during this visit alone     -- Agree with [facility] second opinion, scheduled for November  -- Continue to follow-up with Dr Mariaelena Wilder at [facility]  -- Repeat CRP and fecal calprotectin today  -- Continue to wean Prednisone daily dose by 5mg every week until off    -- Ideally, would start Entyvio for her colitis, given its gut selectivity, but will wait until she sees GI at [facility]  -- Further recs pending above    ______________________________________________________________________    HPI:    Sravani Judd is a 53 y o  female who presents with complex medical history, including long-standing IBD/UC (for > 20 years), MCAS and possible immunodeficiency (for which she receives IVIG), multiple episodes of sepsis, cholecystectomy (2009), hypothyroidism who presents to establish outpatient care for UC  She presents with a large bag with multiple folders with several hundred (if not thousands) of prior records, none of which were available prior to the visit and were therefore not able to be reviewed in advance  She was recently hospitalized in the end of August 2019 with bacteremia (thought to be from gut translocation vs urinary source)  She believes she had 3 episodes of Sepsis in the past 2 years  She notes that she was diagnosed with Crohn's in [address] [place] [address], subsequently changed to UC approximately 3 years later  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  She had previously been on mesalamine and imuran, but imuran reportedly caused elevated LFTs  She has been on Remicade every 6 weeks for the past 18+ months  She has never previously been on a biologic  She has also been getting some IV fluids with the Remicade and it was increased from every 8 weeks to every 6 in November 2018  She has had multiple rounds of prednisone, weaned off Summer 2018, but now on 20mg  She 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 and trembling  She is now feeling better  Her BMs have been okay, with 3-6 per day, but it can be formed to runny (hard or floating)  Occasionally greasy appearing  She notes prior ERCPs and pancreatitis in the past (and she had prior sphincterotomy)  No blood in the stool  She gets epigastric pain when she eats, and then pain right before a BM  + Urgency, occasional nocturnal BMs  No incontinence  + Night sweats in the past week, but no fevers  She lost weight in the hospital  No dysphagia  She has an occasional weird feeling in the location of her esophagus, and it improves when she relaxes  EGD (8/30/2019): Mild mucosal ringing in proximal and mid esophagus  Normal stomach and duodenum  Random biopsy taken from proximal and distal esophagus  Random biopsy taken from duodenum      Colonoscopy (8/30/2019): Moderately severe proctosigmoiditis -2 linear ulcers noted in the mid rectum  Biopsies were taken to rule out CMV  Random biopsies were taken from ascending, transverse, descending and sigmoid colon     Path (8/30/2019):  A  Duodenum, biopsy:     - No significant pathologic abnormalities  - No villous atrophy, increased intraepithelial lymphocytes or crypt hyperplasia to suggest       malabsorptive enteropathy      - No active inflammation, granulomas, organisms, dysplasia or neoplasia identified      B  Esophagus, distal, biopsy:     - Squamous esophageal mucosa without significant pathologic abnormalities  - No intraepithelial eosinophils identified      - No gastric type mucosa, dysplasia or neoplasia identified      C  Esophagus, proximal, biopsy:     - Squamous esophageal mucosa without significant pathologic abnormalities  - No intraepithelial eosinophils identified      - No gastric type mucosa, dysplasia or neoplasia identified      D  Colon, cecum, polyp:     - Colonic mucosa with lymphoid aggregate consistent with redundant mucosal fold  - No dysplasia or neoplasia identified      E  Colon, ascending, random biopsy:     - No significant pathologic abnormalities  - No active inflammation or histologic evidence of a microscopic (lymphocytic) or collagenous colitis noted  - No granulomas, dysplasia or neoplasia identified      F  Colon, transverse, random biopsy:     - No significant pathologic abnormalities  - No active inflammation or histologic evidence of a microscopic (lymphocytic) or collagenous colitis noted  - No granulomas, dysplasia or neoplasia identified      G  Colon, descending, random biopsy:     - No significant pathologic abnormalities  - No active inflammation or histologic evidence of a microscopic (lymphocytic) or collagenous colitis noted  - No granulomas, dysplasia or neoplasia identified      H    Colon, sigmoid, random biopsy:     - Mild acute nonspecific colitis with cryptitis  - No granulomas, crypt abscesses, dysplasia or neoplasia identified      I  Colon, rectum, ulcer, biopsy:     - Acute and chronic colitis with cryptitis, mucosal erosion, and glandular disarray suggestive for active inflammatory       bowel disease      - No dysplasia or neoplasia identified  REVIEW OF SYSTEMS:  10 point ROS reviewed and negative, except as above      Historical Information   Past Medical History:   Diagnosis Date    Anxiety     Asthma     Chronic kidney disease     Deep vein thrombosis (HCC)     Disease of thyroid gland     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    Generalized anxiety disorder 8/26/2017    Heart murmur     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HCC)     Migraine     Myocardial infarction [facility] )     NSTEMI  pt denies, documented in cardio note    Pancreatitis     sphinger of     Psychiatric disorder     RA (rheumatoid arthritis) [facility] )     Ulcerative colitis ([facility] )      Past Surgical History:   Procedure Laterality Date    APPENDECTOMY      CHOLECYSTECTOMY      EGD AND COLONOSCOPY N/A 9/12/2017    Procedure: EGD AND COLONOSCOPY;  Surgeon: Teo Stringer MD;  [facility]: [facility]; Service: Gastroenterology    ERCP N/A 9/15/2017    Procedure: ENDOSCOPIC RETROGRADE CHOLANGIOPANCREATOGRAPHY (ERCP); Surgeon: Joselyn Esparza MD;  [facility];   Service: Gastroenterology    HYSTERECTOMY      KNEE SURGERY Right     LAPAROSCOPIC ENDOMETRIOSIS FULGURATION      ORIF ANKLE FRACTURE Left     AR KNEE SCOPE,MED/LAT MENISECTOMY Left 5/12/2017    Procedure: POSSIBLE MEDIAL MENISECTOMY;  Surgeon: Laney Pena MD;  [facility];  Service: Orthopedics    AR KNEE [address] CART Left 5/12/2017    Procedure: KNEE ARTHROSCOPY EVALUATION, CHONDROPLASTY;  Surgeon: Laney Pena MD;  [facility];  Service: Orthopedics    AR LAP,DIAGNOSTIC ABDOMEN N/A 12/12/2017    Procedure: [place] DIAGNOSTIC  WITH LYSIS OF ADHESIONS;  Surgeon: Sarabjit Merritt DO;  [facility];  Service: General     Social History   Social History     Substance and Sexual Activity   Alcohol Use Never    Frequency: Never    Binge frequency: Never     Social History     Substance and Sexual Activity   Drug Use Not Currently     Social History     Tobacco Use   Smoking Status Never Smoker   Smokeless Tobacco Never Used     Family History   Problem Relation Age of Onset    Ulcerative colitis Mother     Heart failure Father     Neuropathy Father     Macular degeneration Father     Diabetes Father     Asthma Daughter     Hypothyroidism Daughter     Heart disease Maternal Grandmother     Arthritis Maternal Grandmother     Arthritis Paternal Grandmother     Macular degeneration Paternal Grandmother     Lymphoma Paternal Grandfather     Heart failure Paternal Aunt     Heart failure Paternal Aunt     Breast cancer Paternal Aunt 47    Breast cancer additional onset Paternal Aunt 62    Hypothyroidism Paternal Aunt     Breast cancer Maternal Aunt     No Known Problems Maternal Grandfather        Meds/Allergies       Current Outpatient Medications:     aspirin 81 mg chewable tablet    butalbital-acetaminophen-caffeine (FIORICET,ESGIC) -40 mg per tablet    cetirizine (ZyrTEC) 10 mg tablet    cholecalciferol (VITAMIN D) 400 units/mL    diphenhydrAMINE (BENADRYL) 25 mg tablet    EPINEPHrine (EPIPEN 2-MARYSOL IJ)    Fexofenadine HCl (MUCINEX ALLERGY PO)    furosemide (LASIX) 20 mg tablet    hydrOXYzine HCL (ATARAX) 25 mg tablet    inFLIXimab (REMICADE IV)    levothyroxine (LEVOXYL) 50 mcg tablet    LORazepam (ATIVAN) 1 mg tablet    montelukast (SINGULAIR) 10 mg tablet    omalizumab (XOLAIR) 150 mg    ondansetron (ZOFRAN) 4 mg tablet    pantoprazole (PROTONIX) 40 mg tablet    polyethylene glycol (MIRALAX) 17 g packet    predniSONE 10 mg tablet    Probiotic Product (PROBIOTIC DAILY PO)   ranitidine (ZANTAC) 150 MG capsule    temazepam (RESTORIL) 30 mg capsule    traMADol (ULTRAM) 50 mg tablet    Current Facility-Administered Medications:     cyanocobalamin injection 1,000 mcg, 1,000 mcg, Intramuscular, Q30 Days, 1,000 mcg at 09/24/19 9794    Allergies   Allergen Reactions    Amitriptyline Anaphylaxis     According to the patient she had nphylaxis    Aspergillus Fumigatus Other (See Comments), Hives and Swelling    Azathioprine Other (See Comments) and Anaphylaxis     pancreatitis  According to patient she needed Epipen    Sumatriptan Anaphylaxis     Bradycardia, sob, back pressure, head pain,throat tightness  According to the patient she had anphylaxis    Contrast [Iodinated Diagnostic Agents]      Pt states needs to be premedicated prior to injection    Corn Dextrin      Any corn based products    Corn Oil Hives    Gelatin     Histamine      Intolerance      Ibuprofen Hives and Throat Swelling    Malto Dextrin [Dextrin]     Other      Gel caps, maltodextrose, dextrose    Penicillium Notatum     Sulfa Antibiotics Hives and Other (See Comments)    Sulfate     Sulfites            Objective     Blood pressure 108/72, pulse 98, height 5' 5" (1 651 m), weight 60 8 kg (134 lb), not currently breastfeeding  Body mass index is 22 3 kg/m²  PHYSICAL EXAM:      General Appearance:   Alert, cooperative, no distress   HEENT:   Normocephalic, atraumatic, anicteric  Neck:  Supple, symmetrical, trachea midline   Lungs:   Clear to auscultation bilaterally; no rales, rhonchi or wheezing; respirations unlabored    Heart[de-identified]   Regular rate and rhythm; no murmur, rub, or gallop     Abdomen:   Soft, non-tender, non-distended; normal bowel sounds; no masses, no organomegaly    Genitalia:   Deferred    Rectal:   Deferred    Extremities:  No cyanosis, clubbing or edema    Pulses:  2+ and symmetric    Skin:  No jaundice, rashes, or lesions    Lymph nodes:  No palpable cervical lymphadenopathy Lab Results:   No visits with results within 1 Day(s) from this visit     Latest known visit with results is:   Admission on 09/30/2019, Discharged on 09/30/2019   Component Date Value    Sodium 09/30/2019 139     Potassium 09/30/2019 3 2*    Chloride 09/30/2019 102     CO2 09/30/2019 27     ANION GAP 09/30/2019 10     BUN 09/30/2019 8     Creatinine 09/30/2019 0 70     Glucose 09/30/2019 89     Calcium 09/30/2019 8 6     AST 09/30/2019 19     ALT 09/30/2019 38     Alkaline Phosphatase 09/30/2019 52     Total Protein 09/30/2019 7 7     Albumin 09/30/2019 3 7     Total Bilirubin 09/30/2019 0 40     eGFR 09/30/2019 104     WBC 09/30/2019 6 90     RBC 09/30/2019 4 43     Hemoglobin 09/30/2019 14 6     Hematocrit 09/30/2019 43 5     MCV 09/30/2019 98     MCH 09/30/2019 33 0     MCHC 09/30/2019 33 6     RDW 09/30/2019 13 6     MPV 09/30/2019 9 7     Platelets 09/80/4547 297     nRBC 09/30/2019 0     Neutrophils Relative 09/30/2019 50     Immat GRANS % 09/30/2019 0     Lymphocytes Relative 09/30/2019 41     Monocytes Relative 09/30/2019 7     Eosinophils Relative 09/30/2019 1     Basophils Relative 09/30/2019 1     Neutrophils Absolute 09/30/2019 3 47     Immature Grans Absolute 09/30/2019 0 03     Lymphocytes Absolute 09/30/2019 2 81     Monocytes Absolute 09/30/2019 0 47     Eosinophils Absolute 09/30/2019 0 07     Basophils Absolute 09/30/2019 0 05     TSH 3RD GENERATON 09/30/2019 2 791     LACTIC ACID 09/30/2019 2 5*    Color, UA 09/30/2019 Light Yellow     Clarity, UA 09/30/2019 Clear     Specific Gravity, UA 09/30/2019 1 010     pH, UA 09/30/2019 8 0     Leukocytes, UA 09/30/2019 Negative     Nitrite, UA 09/30/2019 Negative     Protein, UA 09/30/2019 Negative     Glucose, UA 09/30/2019 Negative     Ketones, UA 09/30/2019 Trace*    Urobilinogen, UA 09/30/2019 0 2     Bilirubin, UA 09/30/2019 Negative     Blood, UA 09/30/2019 Negative     Amph/Meth UR 09/30/2019 Negative     Barbiturate Ur 09/30/2019 Negative     Benzodiazepine Urine 09/30/2019 Negative     Cocaine Urine 09/30/2019 Negative     Methadone Urine 09/30/2019 Negative     Opiate Urine 09/30/2019 Negative     PCP Ur 09/30/2019 Negative     THC Urine 09/30/2019 Negative     pH, Arnel 09/30/2019 7 523*    pCO2, Arnel 09/30/2019 30 5*    pO2, Arnel 09/30/2019 49 0*    HCO3, Arnel 09/30/2019 24 5     Base Excess, Arnel 09/30/2019 2 6     O2 Content, Arnel 09/30/2019 19 0     O2 HGB, VENOUS 09/30/2019 89 0*    Magnesium 09/30/2019 2 0     Ventricular Rate 09/30/2019 103     Atrial Rate 09/30/2019 103     UT Interval 09/30/2019 122     QRSD Interval 09/30/2019 80     QT Interval 09/30/2019 356     QTC Interval 09/30/2019 466     P Axis 09/30/2019 44     QRS Axis 09/30/2019 49     T Wave Axis 09/30/2019 24      Lab Results   Component Value Date    CRP <3 0 09/05/2019         Radiology Results:   Xr Chest 1 View Portable    Result Date: 9/30/2019  Narrative: CHEST INDICATION:   Generalized illness  COMPARISON:  August 26, 2019 EXAM PERFORMED/VIEWS:  XR CHEST PORTABLE FINDINGS: Cardiomediastinal silhouette appears unremarkable  The lungs are clear  No pneumothorax or pleural effusion  Osseous structures appear within normal limits for patient age  Impression: Normal examination  Workstation performed: [id number]     Ct Head Without Contrast    Result Date: 9/30/2019  Narrative: CT BRAIN - WITHOUT CONTRAST INDICATION:   vertigo  COMPARISON:  CT brain 2/12/2019 TECHNIQUE:  CT examination of the brain was performed  In addition to axial images, coronal 2D reformatted images were created and submitted for interpretation  Radiation dose length product (DLP) for this visit:  815 51 mGy-cm     This examination, like all CT scans performed in the [facility], was performed utilizing techniques to minimize radiation dose exposure, including the use of iterative  reconstruction and automated exposure control  IMAGE QUALITY:  Diagnostic  FINDINGS: PARENCHYMA:  No intracranial mass, mass effect or midline shift  No CT signs of acute infarction  No acute parenchymal hemorrhage  VENTRICLES AND EXTRA-AXIAL SPACES:  Normal for the patient's age  VISUALIZED ORBITS AND PARANASAL SINUSES:  Unremarkable  CALVARIUM AND EXTRACRANIAL SOFT TISSUES:  Normal      Impression: No acute intracranial abnormality  Workstation performed: [id number]     Fl Barium Swallow Video W Speech    Result Date: 9/13/2019  Narrative: A video barium swallow study was performed by the Department of Speech Pathology  Please refer to the report for the official interpretation  The images are stored for archival purposes only  Study images were not formally reviewed by the Radiology Department  CT AP (8/26/2019):  Circumferential thickening of the rectum in keeping with a nonspecific proctitis  No bowel pneumatosis  No bowel obstruction

## 2019-10-08 ENCOUNTER — TELEPHONE (OUTPATIENT)
Dept: HEMATOLOGY ONCOLOGY | Facility: CLINIC | Age: 44
End: 2019-10-08

## 2019-10-08 ENCOUNTER — OFFICE VISIT (OUTPATIENT)
Dept: SLEEP CENTER | Facility: CLINIC | Age: 44
End: 2019-10-08
Payer: COMMERCIAL

## 2019-10-08 VITALS
DIASTOLIC BLOOD PRESSURE: 82 MMHG | WEIGHT: 132 LBS | SYSTOLIC BLOOD PRESSURE: 110 MMHG | BODY MASS INDEX: 21.99 KG/M2 | HEART RATE: 88 BPM | HEIGHT: 65 IN

## 2019-10-08 DIAGNOSIS — F51.01 PRIMARY INSOMNIA: Primary | ICD-10-CM

## 2019-10-08 PROCEDURE — 99214 OFFICE O/P EST MOD 30 MIN: CPT | Performed by: INTERNAL MEDICINE

## 2019-10-08 RX ORDER — CLONAZEPAM 1 MG/1
TABLET ORAL
Qty: 30 TABLET | Refills: 2 | Status: SHIPPED | OUTPATIENT
Start: 2019-10-08 | End: 2019-10-08 | Stop reason: SDUPTHER

## 2019-10-08 RX ORDER — CLONAZEPAM 1 MG/1
1 TABLET ORAL 2 TIMES DAILY
Qty: 30 TABLET | Refills: 2 | Status: SHIPPED | OUTPATIENT
Start: 2019-10-08 | End: 2019-10-08

## 2019-10-08 RX ORDER — CLONAZEPAM 1 MG/1
TABLET ORAL
Qty: 30 TABLET | Refills: 2 | Status: SHIPPED | OUTPATIENT
Start: 2019-10-08 | End: 2019-12-04 | Stop reason: ALTCHOICE

## 2019-10-08 NOTE — PROGRESS NOTES
Progress Note - Sleep Center   Jaylin Carrasco :1975 MRN: [id number]      Reason for Visit:    53 y  o female with chronic sleep initiation and sleep maintenance insomnia    Assessment:  Over the last year, she has experienced a variety of illnesses associated with eosinophilia and other allergic/immunologic abnormalities  Temazepam causes an allergy associated with corn and she has a difficult time with swallowing the capsule  She asked if there was a tablet form available on I said that there was not, however clonazepam is available in a tablet  She is aware that there is a small amount of corn product in clonazepam     Plan:  Clonazepam 1 mg p o  Q h s  Follow up:  Yearly    History of Present Illness:  Chronic psychophysiologic insomnia  Temazepam was effective but she is experiencing some allergic symptoms when she takes the capsule  In addition, the medication last for only 5 hours that she feels that she needs to sleep longer  She does however deny daytime sleepiness        Review of Systems      Genitourinary need to urinate more than twice a night   Cardiology palpitations/fluttering feeling in the chest and ankle/leg swelling   Gastrointestinal frequent heartburn/acid reflux and abdominal pain or cramping that disturb sleep    Neurology frequent headaches, awaken with headache, need to move extremities, muscle weakness, numbness/tingling of an extremity, loss of consciousness, forgetfulness, poor concentration or confusion, , difficulty with memory, loss of consciousness/blacking out and balance problems   Constitutional claustrophobia, fatigue and weight change   Integumentary rash or dry skin and itching   Psychiatry anxiety   Musculoskeletal joint pain, muscle aches, back pain and legs twitching/jerking   Pulmonary shortness of breath with activity and difficulty breathing when lying flat    ENT throat clearing and ringing in ears   Endocrine frequent urination   Hematological none I have reviewed and updated the review of systems as necessary     Historical Information    Past Medical History:   Diagnosis Date    Anxiety     Asthma     Chronic kidney disease     Deep vein thrombosis ([facility] )     Disease of thyroid gland     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    Generalized anxiety disorder 8/26/2017    Heart murmur     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     Migraine     Myocardial infarction ([facility] )     NSTEMI  pt denies, documented in cardio note    Pancreatitis     sphinger of     Psychiatric disorder     RA (rheumatoid arthritis) ([facility] )     Ulcerative colitis ([place] [address] )          Past Surgical History:   Procedure Laterality Date    APPENDECTOMY      CHOLECYSTECTOMY      EGD AND COLONOSCOPY N/A 9/12/2017    Procedure: EGD AND COLONOSCOPY;  Surgeon: Stephen Iraheta MD;  [facility]; Service: Gastroenterology    ERCP N/A 9/15/2017    Procedure: ENDOSCOPIC RETROGRADE CHOLANGIOPANCREATOGRAPHY (ERCP); Surgeon: Lamin Garcia MD;  [facility];   Service: Gastroenterology    HYSTERECTOMY      KNEE SURGERY Right     LAPAROSCOPIC ENDOMETRIOSIS FULGURATION      ORIF ANKLE FRACTURE Left     UT KNEE SCOPE,MED/LAT MENISECTOMY Left 5/12/2017    Procedure: POSSIBLE MEDIAL MENISECTOMY;  Surgeon: Chito Mcneal MD;  [facility];  Service: Orthopedics    UT KNEE [address] CART Left 5/12/2017    Procedure: KNEE ARTHROSCOPY EVALUATION, CHONDROPLASTY;  Surgeon: Chito Mcneal MD;  [facility];  Service: Orthopedics    UT LAP,DIAGNOSTIC ABDOMEN N/A 12/12/2017    Procedure: LAPAROSCOPY DIAGNOSTIC  WITH LYSIS OF ADHESIONS;  Surgeon: Azul Falcon DO;  [facility];  Service: General         Social History     Socioeconomic History    Marital status: /Civil Union     Spouse name: None    Number of children: None    Years of education: None    Highest education level: None   Occupational History    None   Social Needs    Financial resource strain: None    Food insecurity:     Worry: None     Inability: None    Transportation needs:     Medical: None     Non-medical: None   Tobacco Use    Smoking status: Never Smoker    Smokeless tobacco: Never Used   Substance and Sexual Activity    Alcohol use: Never     Frequency: Never     Binge frequency: Never    Drug use: Not Currently    Sexual activity: Yes     Partners: Male   Lifestyle    Physical activity:     Days per week: None     Minutes per session: None    Stress: None   Relationships    Social connections:     Talks on phone: None     Gets together: None     Attends [employer]ism service: None     Active member of club or organization: None     Attends meetings of clubs or organizations: None     Relationship status: None    Intimate partner violence:     Fear of current or ex partner: None     Emotionally abused: None     Physically abused: None     Forced sexual activity: None   Other Topics Concern    None   Social History Narrative    None           History   Alcohol use: Not on file       History   Smoking Status    Not on file   Smokeless Tobacco    Not on file       Family History:   Family History   Problem Relation Age of Onset    Ulcerative colitis Mother     Heart failure Father     Neuropathy Father     Macular degeneration Father     Diabetes Father     Asthma Daughter     Hypothyroidism Daughter     Heart disease Maternal Grandmother     Arthritis Maternal Grandmother     Arthritis Paternal Grandmother     Macular degeneration Paternal Grandmother     Lymphoma Paternal Grandfather     Heart failure Paternal Aunt     Heart failure Paternal Aunt     Breast cancer Paternal Aunt 47    Breast cancer additional onset Paternal Aunt 62    Hypothyroidism Paternal Aunt     Breast cancer Maternal Aunt     No Known Problems Maternal Grandfather        Medications/Allergies:      Current Outpatient Medications:     aspirin 81 mg chewable tablet, Chew 81 mg daily, Disp: , Rfl:     butalbital-acetaminophen-caffeine (FIORICET,ESGIC) -40 mg per tablet, Take 1 tablet by mouth every 8 (eight) hours as needed for headaches, Disp: 20 tablet, Rfl: 0    cetirizine (ZyrTEC) 10 mg tablet, Take 20 mg by mouth 2 (two) times a day , Disp: , Rfl:     cholecalciferol (VITAMIN D) 400 units/mL, Take 2 5 mL (1,000 Units total) by mouth daily, Disp: 1 Bottle, Rfl: 5    diphenhydrAMINE (BENADRYL) 25 mg tablet, Take 50 mg by mouth as needed for itching , Disp: , Rfl:     EPINEPHrine (EPIPEN 2-MARYSOL IJ), EpiPen  prn, Disp: , Rfl:     Fexofenadine HCl (MUCINEX ALLERGY PO), Take by mouth, Disp: , Rfl:     furosemide (LASIX) 20 mg tablet, Take 20 mg by mouth daily as needed , Disp: , Rfl:     hydrOXYzine HCL (ATARAX) 25 mg tablet, , Disp: , Rfl:     inFLIXimab (REMICADE IV), 700 mg Every 6 weeks, Disp: , Rfl:     levothyroxine (LEVOXYL) 50 mcg tablet, Levoxyl 50 mcg tablet, Disp: , Rfl:     LORazepam (ATIVAN) 1 mg tablet, Take 1 tablet (1 mg total) by mouth every 8 (eight) hours as needed for anxiety, Disp: 21 tablet, Rfl: 0    montelukast (SINGULAIR) 10 mg tablet, Take 1 tablet (10 mg total) by mouth 2 (two) times a day, Disp: 60 tablet, Rfl: 3    omalizumab (XOLAIR) 150 mg, Inject 300 mg under the skin every 28 days , Disp: , Rfl:     ondansetron (ZOFRAN) 4 mg tablet, Take 1 tablet (4 mg total) by mouth every 8 (eight) hours as needed for nausea or vomiting, Disp: 20 tablet, Rfl: 0    pantoprazole (PROTONIX) 40 mg tablet, Take 40 mg by mouth 2 (two) times a day , Disp: , Rfl:     polyethylene glycol (MIRALAX) 17 g packet, Take 17 g by mouth 2 (two) times a day, Disp: 14 each, Rfl: 0    predniSONE 10 mg tablet, 4 tabs po daily x1 week, then 3&1/2 tab po qd x 1 week, then 3 tabs po qd x 1 week, then 2&1/2 tabs po qd x 1 week then 2 tabs qd afterwards (Patient taking differently: 20 mg 4 tabs po daily x1 week, then 3&1/2 tab po qd x 1 week, then 3 tabs po qd x 1 week, then 2&1/2 tabs po qd x 1 week then 2 tabs qd afterwards), Disp: 101 tablet, Rfl: 0    Probiotic Product (PROBIOTIC DAILY PO), Probiotic  floistar, Disp: , Rfl:     ranitidine (ZANTAC) 150 MG capsule, Take 300 mg by mouth 2 (two) times a day , Disp: , Rfl:     temazepam (RESTORIL) 30 mg capsule, Take 1 capsule (30 mg total) by mouth daily at bedtime, Disp: 30 capsule, Rfl: 0    traMADol (ULTRAM) 50 mg tablet, Take 1 tablet (50 mg total) by mouth every 6 (six) hours as needed for moderate pain or severe pain, Disp: 10 tablet, Rfl: 0    clonazePAM (KlonoPIN) 1 mg tablet, One p o  Q h s , Disp: 30 tablet, Rfl: 2    Current Facility-Administered Medications:     cyanocobalamin injection 1,000 mcg, 1,000 mcg, Intramuscular, Q30 Days, Gisel Peterson PA-C, 1,000 mcg at 09/24/19 0904      Objective    Vital Signs:   Vitals:    10/08/19 1200   BP: 110/82   Pulse: 88   Weight: 59 9 kg (132 lb)   Height: 5' 5 25" (1 657 m)     [place] Sleepiness Scale: Total score: 1    Physical Exam:    General: Alert, appropriate, cooperative, overweight    Head: NC/AT    Skin: Warm, dry    Neuro: No motor abnormalities, cranial nerves appear intact    Psych: Normal affect            AMIRA Zelaya    Board Certified Sleep Specialist

## 2019-10-08 NOTE — TELEPHONE ENCOUNTER
Per Dr Estuardo Durbin, a 2nd opinion consult was set up by our  at [facility]  Pt's records were faxed to  and pt will be contacted for an appointment

## 2019-10-09 ENCOUNTER — TRANSCRIBE ORDERS (OUTPATIENT)
Dept: GASTROENTEROLOGY | Facility: CLINIC | Age: 44
End: 2019-10-09

## 2019-10-10 DIAGNOSIS — K52.9 IBD (INFLAMMATORY BOWEL DISEASE): Primary | ICD-10-CM

## 2019-10-11 ENCOUNTER — OFFICE VISIT (OUTPATIENT)
Dept: FAMILY MEDICINE CLINIC | Facility: CLINIC | Age: 44
End: 2019-10-11
Payer: COMMERCIAL

## 2019-10-11 VITALS
DIASTOLIC BLOOD PRESSURE: 84 MMHG | HEIGHT: 65 IN | BODY MASS INDEX: 21.99 KG/M2 | TEMPERATURE: 98 F | OXYGEN SATURATION: 97 % | HEART RATE: 102 BPM | SYSTOLIC BLOOD PRESSURE: 110 MMHG | WEIGHT: 132 LBS | RESPIRATION RATE: 16 BRPM

## 2019-10-11 DIAGNOSIS — K52.9 IBD (INFLAMMATORY BOWEL DISEASE): ICD-10-CM

## 2019-10-11 DIAGNOSIS — B37.0 OROPHARYNGEAL CANDIDIASIS: Primary | ICD-10-CM

## 2019-10-11 DIAGNOSIS — R65.20 SEVERE SEPSIS (HCC): ICD-10-CM

## 2019-10-11 DIAGNOSIS — F41.9 ANXIETY: ICD-10-CM

## 2019-10-11 DIAGNOSIS — A41.9 SEVERE SEPSIS (HCC): ICD-10-CM

## 2019-10-11 PROCEDURE — 99214 OFFICE O/P EST MOD 30 MIN: CPT | Performed by: FAMILY MEDICINE

## 2019-10-11 RX ORDER — FLUCONAZOLE 100 MG/1
TABLET ORAL
Qty: 90 TABLET | Refills: 0 | Status: SHIPPED | OUTPATIENT
Start: 2019-10-11 | End: 2019-12-31

## 2019-10-11 NOTE — PROGRESS NOTES
Assessment/Plan     Diagnoses and all orders for this visit:    Oropharyngeal candidiasis  Comments:  no active lesions on exam, she does have increased risk for oropharyngeal thrush, will start suppressive therapy with po fluconazole, monitor closely  Orders:  -     fluconazole (DIFLUCAN) 100 mg tablet; Take 2 tabs po on day 1, then 1 tab po for 14 days, then 1 tab po daily on M,W,F    Severe sepsis ([address] )  Comments:  resolved, seems to be doing well at this time, continue current medications  IBD (inflammatory bowel disease)  Comments:  managed by GI, continue plan and medications outlined by them, will await the assessment and plan from [facility]  Anxiety  Comments:  stable, mood and affect appropriate today, strongly advised against multiple benzo use, will defer to other providers for management  Subjective     Patient Id: Sravani Judd is a 53 y o  female    HPI    Patient with complex past medical history to include MCAS, longstanding IBD/UC, immunodeficiency for which she takes IVIG, and anxiety presenting today for transition of care  Recently hospitalized initially from 8/26/2019-9/12/2019 at [facility] and then was transferred for higher level of care to [place] [address] where she was admitted from 9/1219-9/17/17  She was admitted initially with severe sepsis 2/2 gram-neg bacteremia  She did also have one ED visit on 9/30/19 since her discharge for a transient neurologic deficit  She reports that since this ER visit she has been doing fairly well but still is concerned about her medical conditions overall  She has an upcoming appointment at [facility] in approx 1 month for further evaluation  Today, patient is concerned about potential oral candidiasis  States this has been ongoing for quite some time and relates this to her chronic immunocompromised state  She apparently was treated for thrush while hospitalized with both oral and IV fluconazole   She notes that now she can feel a "film" inside her oral cavity and is wondering if she should be on suppressive antifungal therapy  Patient is also requesting a refill on her Ativan today  Per her chart, she is currently prescribed 3 different benzodiazepines  Notes she only takes the Ativan when needed for anxiety and would like to make sure she has some for her upcoming trip to [facility]  She states she still has some left over from last rx  Patient is otherwise without complaint or concerns  Review of Systems   Constitutional: Negative for chills and fever  HENT: Positive for mouth sores  Respiratory: Negative for shortness of breath  Cardiovascular: Negative for chest pain  Gastrointestinal: Negative for abdominal pain, constipation, diarrhea, nausea and vomiting  Genitourinary: Negative for dysuria  Musculoskeletal: Negative for arthralgias and myalgias  Skin: Negative for rash  Neurological: Negative for headaches  Psychiatric/Behavioral: Negative for dysphoric mood  The patient is nervous/anxious  Past Medical History:   Diagnosis Date    Anxiety     Asthma     Chronic kidney disease     Deep vein thrombosis (HCC)     Disease of thyroid gland     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    Generalized anxiety disorder 8/26/2017    Heart murmur     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HCC)     Migraine     My[facility] infarction ([facility] )     NSTEMI  pt denies, documented in cardio note    Pancreatitis     sphinger of     Psychiatric disorder     RA (rheumatoid arthritis) ([facility] )     Ulcerative colitis ([facility] [facility] )        Past Surgical History:   Procedure Laterality Date    APPENDECTOMY      CHOLECYSTECTOMY      EGD AND COLONOSCOPY N/A 9/12/2017    Procedure: EGD AND COLONOSCOPY;  Surgeon: Mena Sanders MD;  [facility]; Service: Gastroenterology    ERCP N/A 9/15/2017    Procedure: ENDOSCOPIC RETROGRADE CHOLANGIOPANCREATOGRAPHY (ERCP);   Surgeon: Ulises Celestin MD;  [facility];  Service: Gastroenterology    HYSTERECTOMY      KNEE SURGERY Right     LAPAROSCOPIC ENDOMETRIOSIS FULGURATION      ORIF ANKLE FRACTURE Left     MA KNEE SCOPE,MED/LAT MENISECTOMY Left 5/12/2017    Procedure: POSSIBLE MEDIAL MENISECTOMY;  Surgeon: Ana Mcneal MD;  [facility];  Service: Orthopedics    MA KNEE [address] CART Left 5/12/2017    Procedure: KNEE ARTHROSCOPY EVALUATION, CHONDROPLASTY;  Surgeon: Ana Mcneal MD;  [facility];  Service: Orthopedics    MA LAP,DIAGNOSTIC ABDOMEN N/A 12/12/2017    Procedure: LAPAROSCOPY DIAGNOSTIC  WITH LYSIS OF ADHESIONS;  Surgeon: Isi Fernandez DO;  Location:  MAIN OR;  Service: General       Family History   Problem Relation Age of Onset    Ulcerative colitis Mother     Heart failure Father     Neuropathy Father     Macular degeneration Father     Diabetes Father     Asthma Daughter     Hypothyroidism Daughter     Heart disease Maternal Grandmother     Arthritis Maternal Grandmother     Arthritis Paternal Grandmother     Macular degeneration Paternal Grandmother     Lymphoma Paternal Grandfather     Heart failure Paternal Aunt     Heart failure Paternal [de-identified]     Breast cancer Paternal Aunt 53    Breast cancer additional onset Paternal Aunt 62    Hypothyroidism Paternal Aunt     Breast cancer Maternal Aunt     No Known Problems Maternal Grandfather        Allergies   Allergen Reactions    Amitriptyline Anaphylaxis     According to the patient she had nphylaxis    Aspergillus Fumigatus Other (See Comments), Hives and Swelling    Azathioprine Other (See Comments) and Anaphylaxis     pancreatitis  According to patient she needed Epipen    Eggs Or Egg-Derived Products Anaphylaxis    Sumatriptan Anaphylaxis     Bradycardia, sob, back pressure, head pain,throat tightness  According to the patient she had anphylaxis    Contrast [Iodinated Diagnostic Agents]      Pt states needs to be premedicated prior to injection    Corn Dextrin      Any corn based products    Corn Oil Hives    Gelatin     Histamine      Intolerance      Ibuprofen Hives and Throat Swelling    Malto Dextrin [Dextrin]     Other      Gel caps, maltodextrose, dextrose,grapes    Penicillium Notatum     Sulfa Antibiotics Hives and Other (See Comments)    Sulfate     Sulfites          Current Outpatient Medications:     aspirin 81 mg chewable tablet, Chew 81 mg daily, Disp: , Rfl:     butalbital-acetaminophen-caffeine (FIORICET,ESGIC) -40 mg per tablet, Take 1 tablet by mouth every 8 (eight) hours as needed for headaches, Disp: 20 tablet, Rfl: 0    cetirizine (ZyrTEC) 10 mg tablet, Take 20 mg by mouth 2 (two) times a day , Disp: , Rfl:     cholecalciferol (VITAMIN D) 400 units/mL, Take 2 5 mL (1,000 Units total) by mouth daily, Disp: 1 Bottle, Rfl: 5    clonazePAM (KlonoPIN) 1 mg tablet, One p o  Q h s , Disp: 30 tablet, Rfl: 2    diphenhydrAMINE (BENADRYL) 25 mg tablet, Take 50 mg by mouth as needed for itching , Disp: , Rfl:     EPINEPHrine (EPIPEN 2-MARYSOL IJ), EpiPen  prn, Disp: , Rfl:     Fexofenadine HCl (MUCINEX ALLERGY PO), Take by mouth, Disp: , Rfl:     furosemide (LASIX) 20 mg tablet, Take 20 mg by mouth daily as needed , Disp: , Rfl:     hydrOXYzine HCL (ATARAX) 25 mg tablet, , Disp: , Rfl:     inFLIXimab (REMICADE IV), 700 mg Every 6 weeks, Disp: , Rfl:     levothyroxine (LEVOXYL) 50 mcg tablet, Levoxyl 50 mcg tablet, Disp: , Rfl:     LORazepam (ATIVAN) 1 mg tablet, Take 1 tablet (1 mg total) by mouth every 8 (eight) hours as needed for anxiety, Disp: 21 tablet, Rfl: 0    montelukast (SINGULAIR) 10 mg tablet, Take 1 tablet (10 mg total) by mouth 2 (two) times a day, Disp: 60 tablet, Rfl: 3    omalizumab (XOLAIR) 150 mg, Inject 300 mg under the skin every 28 days , Disp: , Rfl:     ondansetron (ZOFRAN) 4 mg tablet, Take 1 tablet (4 mg total) by mouth every 8 (eight) hours as needed for nausea or vomiting, Disp: 20 tablet, Rfl: 0    pantoprazole (PROTONIX) 40 mg tablet, Take 40 mg by mouth 2 (two) times a day , Disp: , Rfl:     polyethylene glycol (MIRALAX) 17 g packet, Take 17 g by mouth 2 (two) times a day, Disp: 14 each, Rfl: 0    predniSONE 10 mg tablet, 4 tabs po daily x1 week, then 3&1/2 tab po qd x 1 week, then 3 tabs po qd x 1 week, then 2&1/2 tabs po qd x 1 week then 2 tabs qd afterwards (Patient taking differently: 20 mg 4 tabs po daily x1 week, then 3&1/2 tab po qd x 1 week, then 3 tabs po qd x 1 week, then 2&1/2 tabs po qd x 1 week then 2 tabs qd afterwards), Disp: 101 tablet, Rfl: 0    Probiotic Product (PROBIOTIC DAILY PO), Probiotic  floistar, Disp: , Rfl:     ranitidine (ZANTAC) 150 MG capsule, Take 300 mg by mouth 2 (two) times a day , Disp: , Rfl:     temazepam (RESTORIL) 30 mg capsule, Take 1 capsule (30 mg total) by mouth daily at bedtime, Disp: 30 capsule, Rfl: 0    traMADol (ULTRAM) 50 mg tablet, Take 1 tablet (50 mg total) by mouth every 6 (six) hours as needed for moderate pain or severe pain, Disp: 10 tablet, Rfl: 0    fluconazole (DIFLUCAN) 100 mg tablet, Take 2 tabs po on day 1, then 1 tab po for 14 days, then 1 tab po daily on M,W,F, Disp: 90 tablet, Rfl: 0    Current Facility-Administered Medications:     cyanocobalamin injection 1,000 mcg, 1,000 mcg, Intramuscular, Q30 Days, Elke Zamorano PA-C, 1,000 mcg at 09/24/19 0904    Objective     Vitals:    10/11/19 1312   BP: 110/84   BP Location: Left arm   Patient Position: Sitting   Cuff Size: Standard   Pulse: 102   Resp: 16   Temp: 98 °F (36 7 °C)   TempSrc: Tympanic   SpO2: 97%   Weight: 59 9 kg (132 lb)   Height: 5' 5" (1 651 m)       Physical Exam   Constitutional: She is oriented to person, place, and time  She appears well-developed and well-nourished  No distress  HENT:   Head: Normocephalic and atraumatic     Right Ear: External ear normal    Left Ear: External ear normal    Nose: Nose normal    Mouth/Throat: Uvula is midline, oropharynx is clear and moist and mucous membranes are normal  No oral lesions  No tonsillar exudate  Notable hypopigmentation of the hard palate and posterior pharynx  No identifiable thrush or oral lesions  Eyes: Pupils are equal, round, and reactive to light  Conjunctivae and EOM are normal    Neck: Neck supple  No thyromegaly present  Cardiovascular: Normal rate, regular rhythm and normal heart sounds  No murmur heard  Pulmonary/Chest: Effort normal and breath sounds normal  No respiratory distress  She has no wheezes  Genitourinary:   Genitourinary Comments: Exam deferred   Musculoskeletal: She exhibits no edema  Neurological: She is alert and oriented to person, place, and time  Skin: Skin is warm  Psychiatric: She has a normal mood and affect  Her behavior is normal    Nursing note and vitals reviewed        Kelton Richards

## 2019-10-12 NOTE — PATIENT INSTRUCTIONS
Oral Candidiasis   AMBULATORY CARE:   Oral candidiasis , or thrush, is a fungal infection that affects the inside of your mouth  Seek care immediately if:   · You have trouble swallowing and your jaw and neck are stiff  · You are dizzy, thirsty, or have a dry mouth  · You are urinating little or not at all  · You cannot eat or drink because of the pain  Contact your healthcare provider if:   · You have a fever  · You have nausea, vomiting, or diarrhea  · Your signs and symptoms get worse, even after treatment  · You have questions or concerns about your condition or care  Common symptoms include the following:   · White or whitish-yellow patches in the mouth that look like milk curds    · Redness or bleeding under the patches    · Sore and painful mouth, with cracking or tearing on the corners    · Bright red tongue that may feel like it is burning    · Trouble swallowing and tasting    · Swelling under dentures  Treatment for oral candidiasis  includes antifungal medicine that helps kill the fungus that caused your oral candidiasis  This medicine may be a pill or a solution that you gargle  Remove dentures before you gargle  Prevent oral candidiasis:  Brush your teeth, gums, and tongue after you eat and before you go to sleep  Use a toothbrush with soft bristles  See your dentist for regular exams  Remove your dentures when you sleep, or at least 6 hours each day  Clean your dentures and soak them in denture   Let them air dry after soaking  Follow up with your healthcare provider as directed:  Write down your questions so you remember to ask them during your visits  © 2017 [address] [employer] Information is for End User's use only and may not be sold, redistributed or otherwise used for commercial purposes  All illustrations and images included in CareNotes® are the copyrighted property of [employer]  or [employer] Alber[employer] Conner    The above information is an educational aid only  It is not intended as medical advice for individual conditions or treatments  Talk to your doctor, nurse or pharmacist before following any medical regimen to see if it is safe and effective for you

## 2019-10-21 DIAGNOSIS — D47.09 MAST CELL DISEASE: ICD-10-CM

## 2019-10-21 NOTE — ASSESSMENT & PLAN NOTE
Patient initially presented to [address] [place] [address] with severe sepsis secondary to gram-negative bacteremia  She was initially treated with IV cefepime and was seen in consultation by Infectious Diseases  She was eventually transitioned to PO Levaquin 750 mg daily on 08/31 and she completed a 14 day course of antibiotic therapy  unsteady/decreased lenin

## 2019-10-25 ENCOUNTER — APPOINTMENT (OUTPATIENT)
Dept: LAB | Facility: HOSPITAL | Age: 44
End: 2019-10-25
Attending: INTERNAL MEDICINE
Payer: COMMERCIAL

## 2019-10-25 ENCOUNTER — OFFICE VISIT (OUTPATIENT)
Dept: FAMILY MEDICINE CLINIC | Facility: CLINIC | Age: 44
End: 2019-10-25
Payer: COMMERCIAL

## 2019-10-25 VITALS
TEMPERATURE: 97.2 F | OXYGEN SATURATION: 98 % | BODY MASS INDEX: 21.99 KG/M2 | HEART RATE: 95 BPM | HEIGHT: 65 IN | WEIGHT: 132 LBS | DIASTOLIC BLOOD PRESSURE: 76 MMHG | SYSTOLIC BLOOD PRESSURE: 108 MMHG

## 2019-10-25 DIAGNOSIS — E44.1 MILD PROTEIN-CALORIE MALNUTRITION (HCC): Primary | ICD-10-CM

## 2019-10-25 DIAGNOSIS — Z13.220 SCREENING FOR LIPID DISORDERS: ICD-10-CM

## 2019-10-25 DIAGNOSIS — F41.9 ANXIETY: ICD-10-CM

## 2019-10-25 DIAGNOSIS — K52.9 IBD (INFLAMMATORY BOWEL DISEASE): ICD-10-CM

## 2019-10-25 DIAGNOSIS — Z86.19 HISTORY OF RECURRENT INFECTION: ICD-10-CM

## 2019-10-25 DIAGNOSIS — E55.9 VITAMIN D DEFICIENCY, UNSPECIFIED: ICD-10-CM

## 2019-10-25 DIAGNOSIS — D75.839 THROMBOCYTOSIS: ICD-10-CM

## 2019-10-25 DIAGNOSIS — E03.9 ACQUIRED HYPOTHYROIDISM: Chronic | ICD-10-CM

## 2019-10-25 DIAGNOSIS — E83.42 HYPOMAGNESEMIA: ICD-10-CM

## 2019-10-25 DIAGNOSIS — Z87.09 HISTORY OF STREP PHARYNGITIS: ICD-10-CM

## 2019-10-25 DIAGNOSIS — E44.1 MILD PROTEIN-CALORIE MALNUTRITION (HCC): ICD-10-CM

## 2019-10-25 DIAGNOSIS — Z86.19 HISTORY OF LYME DISEASE: ICD-10-CM

## 2019-10-25 DIAGNOSIS — D53.9 NUTRITIONAL ANEMIA, UNSPECIFIED: ICD-10-CM

## 2019-10-25 DIAGNOSIS — D84.9 IMMUNOSUPPRESSED STATUS (HCC): ICD-10-CM

## 2019-10-25 DIAGNOSIS — D89.40 MAST CELL ACTIVATION SYNDROME (HCC): ICD-10-CM

## 2019-10-25 DIAGNOSIS — Z87.09 HISTORY OF RECURRENT TONSILLITIS: ICD-10-CM

## 2019-10-25 DIAGNOSIS — B37.0 THRUSH: ICD-10-CM

## 2019-10-25 LAB
ALBUMIN SERPL BCP-MCNC: 3.7 G/DL (ref 3.5–5)
ALP SERPL-CCNC: 41 U/L (ref 46–116)
ALT SERPL W P-5'-P-CCNC: 27 U/L (ref 12–78)
ANION GAP SERPL CALCULATED.3IONS-SCNC: 8 MMOL/L (ref 4–13)
AST SERPL W P-5'-P-CCNC: 26 U/L (ref 5–45)
BACTERIA UR QL AUTO: ABNORMAL /HPF
BASOPHILS # BLD AUTO: 0.08 THOUSANDS/ΜL (ref 0–0.1)
BASOPHILS NFR BLD AUTO: 2 % (ref 0–1)
BILIRUB SERPL-MCNC: 0.5 MG/DL (ref 0.2–1)
BILIRUB UR QL STRIP: NEGATIVE
BUN SERPL-MCNC: 9 MG/DL (ref 5–25)
CALCIUM SERPL-MCNC: 8.7 MG/DL (ref 8.3–10.1)
CHLORIDE SERPL-SCNC: 103 MMOL/L (ref 100–108)
CHOLEST SERPL-MCNC: 200 MG/DL (ref 50–200)
CK SERPL-CCNC: 54 U/L (ref 26–192)
CLARITY UR: CLEAR
CO2 SERPL-SCNC: 26 MMOL/L (ref 21–32)
COLOR UR: YELLOW
CORTIS AM PEAK SERPL-MCNC: 7.4 UG/DL (ref 4.2–22.4)
CREAT SERPL-MCNC: 0.61 MG/DL (ref 0.6–1.3)
CRP SERPL QL: <0.5 MG/L
EOSINOPHIL # BLD AUTO: 0.06 THOUSAND/ΜL (ref 0–0.61)
EOSINOPHIL NFR BLD AUTO: 1 % (ref 0–6)
ERYTHROCYTE [DISTWIDTH] IN BLOOD BY AUTOMATED COUNT: 13.2 % (ref 11.6–15.1)
FERRITIN SERPL-MCNC: 47 NG/ML (ref 8–388)
FOLATE SERPL-MCNC: >20 NG/ML (ref 3.1–17.5)
GFR SERPL CREATININE-BSD FRML MDRD: 109 ML/MIN/1.73SQ M
GLUCOSE SERPL-MCNC: 101 MG/DL (ref 65–140)
GLUCOSE UR STRIP-MCNC: NEGATIVE MG/DL
HCT VFR BLD AUTO: 43 % (ref 34.8–46.1)
HDLC SERPL-MCNC: 75 MG/DL
HGB BLD-MCNC: 14.6 G/DL (ref 11.5–15.4)
HGB UR QL STRIP.AUTO: ABNORMAL
IMM GRANULOCYTES # BLD AUTO: 0.01 THOUSAND/UL (ref 0–0.2)
IMM GRANULOCYTES NFR BLD AUTO: 0 % (ref 0–2)
IRON SATN MFR SERPL: 21 %
IRON SERPL-MCNC: 75 UG/DL (ref 50–170)
KETONES UR STRIP-MCNC: NEGATIVE MG/DL
LDLC SERPL CALC-MCNC: 115 MG/DL (ref 0–100)
LEUKOCYTE ESTERASE UR QL STRIP: NEGATIVE
LYMPHOCYTES # BLD AUTO: 1.61 THOUSANDS/ΜL (ref 0.6–4.47)
LYMPHOCYTES NFR BLD AUTO: 30 % (ref 14–44)
MAGNESIUM SERPL-MCNC: 2 MG/DL (ref 1.6–2.6)
MCH RBC QN AUTO: 33.1 PG (ref 26.8–34.3)
MCHC RBC AUTO-ENTMCNC: 34 G/DL (ref 31.4–37.4)
MCV RBC AUTO: 98 FL (ref 82–98)
MONOCYTES # BLD AUTO: 0.46 THOUSAND/ΜL (ref 0.17–1.22)
MONOCYTES NFR BLD AUTO: 9 % (ref 4–12)
NEUTROPHILS # BLD AUTO: 3.07 THOUSANDS/ΜL (ref 1.85–7.62)
NEUTS SEG NFR BLD AUTO: 58 % (ref 43–75)
NITRITE UR QL STRIP: NEGATIVE
NON-SQ EPI CELLS URNS QL MICRO: ABNORMAL /HPF
NRBC BLD AUTO-RTO: 0 /100 WBCS
PH UR STRIP.AUTO: 6 [PH]
PHOSPHATE SERPL-MCNC: 2.8 MG/DL (ref 2.7–4.5)
PLATELET # BLD AUTO: 347 THOUSANDS/UL (ref 149–390)
PMV BLD AUTO: 9.3 FL (ref 8.9–12.7)
POTASSIUM SERPL-SCNC: 3.6 MMOL/L (ref 3.5–5.3)
PROT SERPL-MCNC: 8.2 G/DL (ref 6.4–8.2)
PROT UR STRIP-MCNC: NEGATIVE MG/DL
PTH-INTACT SERPL-MCNC: 57.1 PG/ML (ref 18.4–80.1)
RBC # BLD AUTO: 4.41 MILLION/UL (ref 3.81–5.12)
RBC #/AREA URNS AUTO: ABNORMAL /HPF
SODIUM SERPL-SCNC: 137 MMOL/L (ref 136–145)
SP GR UR STRIP.AUTO: 1.02 (ref 1–1.03)
TIBC SERPL-MCNC: 363 UG/DL (ref 250–450)
TRIGL SERPL-MCNC: 52 MG/DL
TSH SERPL DL<=0.05 MIU/L-ACNC: 1.73 UIU/ML (ref 0.36–3.74)
UROBILINOGEN UR QL STRIP.AUTO: 0.2 E.U./DL
VIT B12 SERPL-MCNC: >6000 PG/ML (ref 100–900)
WBC # BLD AUTO: 5.29 THOUSAND/UL (ref 4.31–10.16)
WBC #/AREA URNS AUTO: ABNORMAL /HPF

## 2019-10-25 PROCEDURE — 82533 TOTAL CORTISOL: CPT

## 2019-10-25 PROCEDURE — 82024 ASSAY OF ACTH: CPT

## 2019-10-25 PROCEDURE — 86063 ANTISTREPTOLYSIN O SCREEN: CPT

## 2019-10-25 PROCEDURE — 87389 HIV-1 AG W/HIV-1&-2 AB AG IA: CPT

## 2019-10-25 PROCEDURE — 83735 ASSAY OF MAGNESIUM: CPT

## 2019-10-25 PROCEDURE — 87070 CULTURE OTHR SPECIMN AEROBIC: CPT

## 2019-10-25 PROCEDURE — 81001 URINALYSIS AUTO W/SCOPE: CPT | Performed by: FAMILY MEDICINE

## 2019-10-25 PROCEDURE — 86663 EPSTEIN-BARR ANTIBODY: CPT

## 2019-10-25 PROCEDURE — 83540 ASSAY OF IRON: CPT

## 2019-10-25 PROCEDURE — 86664 EPSTEIN-BARR NUCLEAR ANTIGEN: CPT

## 2019-10-25 PROCEDURE — 86665 EPSTEIN-BARR CAPSID VCA: CPT

## 2019-10-25 PROCEDURE — 96372 THER/PROPH/DIAG INJ SC/IM: CPT | Performed by: FAMILY MEDICINE

## 2019-10-25 PROCEDURE — 84443 ASSAY THYROID STIM HORMONE: CPT

## 2019-10-25 PROCEDURE — 36415 COLL VENOUS BLD VENIPUNCTURE: CPT

## 2019-10-25 PROCEDURE — 86140 C-REACTIVE PROTEIN: CPT

## 2019-10-25 PROCEDURE — 82728 ASSAY OF FERRITIN: CPT

## 2019-10-25 PROCEDURE — 82746 ASSAY OF FOLIC ACID SERUM: CPT

## 2019-10-25 PROCEDURE — 83970 ASSAY OF PARATHORMONE: CPT

## 2019-10-25 PROCEDURE — 80053 COMPREHEN METABOLIC PANEL: CPT

## 2019-10-25 PROCEDURE — 85025 COMPLETE CBC W/AUTO DIFF WBC: CPT

## 2019-10-25 PROCEDURE — 83615 LACTATE (LD) (LDH) ENZYME: CPT

## 2019-10-25 PROCEDURE — 99213 OFFICE O/P EST LOW 20 MIN: CPT | Performed by: FAMILY MEDICINE

## 2019-10-25 PROCEDURE — 84630 ASSAY OF ZINC: CPT

## 2019-10-25 PROCEDURE — 82550 ASSAY OF CK (CPK): CPT

## 2019-10-25 PROCEDURE — 82652 VIT D 1 25-DIHYDROXY: CPT

## 2019-10-25 PROCEDURE — 82607 VITAMIN B-12: CPT

## 2019-10-25 PROCEDURE — 83625 ASSAY OF LDH ENZYMES: CPT

## 2019-10-25 PROCEDURE — 83550 IRON BINDING TEST: CPT

## 2019-10-25 PROCEDURE — 80061 LIPID PANEL: CPT

## 2019-10-25 PROCEDURE — 86618 LYME DISEASE ANTIBODY: CPT

## 2019-10-25 PROCEDURE — 84100 ASSAY OF PHOSPHORUS: CPT

## 2019-10-25 RX ADMIN — CYANOCOBALAMIN 1000 MCG: 1000 INJECTION INTRAMUSCULAR; SUBCUTANEOUS at 12:04

## 2019-10-25 NOTE — PROGRESS NOTES
Assessment/Plan:     Diagnoses and all orders for this visit:    Mild protein-calorie malnutrition ([facility] [address] )  -     UA w Reflex to Microscopic w Reflex to Culture -Lab Collect  -     Vitamin D 1,25 dihydroxy; Future  -     Vitamin B12; Future  -     Folate; Future  -     Iron Panel (Includes Ferritin, Iron Sat%, Iron, and TIBC); Future  -     Zinc; Future    Immunosuppressed status ([facility] [address] )  -     ACTH; Future  -     Cortisol Level, AM Specimen; Future  -     Lactate dehydrogenase, isoenzymes; Future  -     C-reactive protein; Future  -     HIV 1/2 AG-AB combo; Future    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HCC)    Anxiety    Thrombocytosis (HCC)  -     CBC and differential; Future  -     Comprehensive metabolic panel; Future    Thrush    Acquired hypothyroidism  -     TSH, 3rd generation with Free T4 reflex; Future  -     PTH, intact; Future    Screening for lipid disorders  -     Lipid Panel with Direct LDL reflex; Future  -     CK (with reflex to MB); Future    History of recurrent infection  -     HIV 1/2 AG-AB combo; Future    History of Lyme disease  -     Lyme Antibody Profile with reflex to WB; Future    History of strep pharyngitis  -     ASO Screen w/ reflex to Titer; Future    Vitamin D deficiency, unspecified   -     Vitamin D 1,25 dihydroxy; Future    Nutritional anemia, unspecified   -     Vitamin B12; Future  -     Folate; Future    Hypomagnesemia  -     Magnesium; Future  -     Phosphorus; Future    History of recurrent tonsillitis  -     EBV acute panel; Future        Thrush/History of strep/Recurrent infections - continue PO fluconazole as directed  Check culture, labs, etc  Discussed ENT/infectious disease follow up  Continue f/u specialists as directed  Has upcoming f/u with [facility]  B12 injection in office today        Return in about 4 weeks (around 11/22/2019) for symptom recheck, and follow up with specialist as directed, result review         Subjective:        Patient ID: Jaylin Luna is a 53 y o  female  Chief Complaint   Patient presents with    Follow-up    B12 Injection       Patient is a 66-year-old female with complex past medical history who presents to the office today for follow-up  She was evaluated for transition of care appointment 2 weeks ago  She was started on p o  Fluconazole for thrush  No active lesions noted at that time, but was started on medication due to symptoms and immunocompromised status  She states she was feeling better, but once p o  Fluconazole dose was decreased, she started experiencing symptoms  She states she was given Flagyl in the hospital and is requesting prescription for this today  She is due for repeat labs  She is overall very upset regarding her health, states specialists have not helped and is requesting labs  She has not followed up with ENT since tonsillectomy  She has not been evaluated by Infectious Disease  She does have upcoming follow-up with [facility]  She overall states she isn't feeling well and feels as though infection is starting again  She reports thick coating of tongue, is able to eat and drink currently  No fevers or chills today  No adenopathy  She has chronic dysphagia  No abdominal pain, nausea, vomiting, chest pain, sob, cough         The following portions of the patient's history were reviewed and updated as appropriate: allergies, current medications, past family history, past medical history, past social history, past surgical history and problem list     Patient Active Problem List   Diagnosis    Chondromalacia    Crohn's colitis ([facility] [address] )    SIRS (systemic inflammatory response syndrome) ([address]    Exacerbation of ulcerative colitis with rectal bleeding ([address] )    Vitamin D deficiency    Hypotension    Throat tightness    Septic shock ([address] )    Bradycardia    Abdominal pain    Headache    Hypomagnesemia    Generalized anxiety disorder    Hypothyroidism    Low TSH level    Hypocalcemia    Hyperglycemia  Acute respiratory failure with hypoxia (HCC)    Bilateral pleural effusion    Acute cardiogenic pulmonary edema (HCC)    Elevated troponin level    Abnormal CT of the abdomen    Leukocytosis    Arm swelling    Thrombophlebitis    Dysuria    Anxiety    CHF (congestive heart failure) (HCC)    Fibromyalgia    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HCC)    Meniere disease    Visual disturbance    Chronic idiopathic urticaria    Disorder of synovium    Primary localized osteoarthrosis of ankle and foot    Thrush    Palpitation    Chronic back pain    Thrombocytosis (HCC)    Proctitis    Immunosuppressed status (HCC)    Mass of left axilla    Constipation    Mild protein-calorie malnutrition (HCC)       Current Outpatient Medications   Medication Sig Dispense Refill    aspirin 81 mg chewable tablet Chew 81 mg daily      butalbital-acetaminophen-caffeine (FIORICET,ESGIC) -40 mg per tablet Take 1 tablet by mouth every 8 (eight) hours as needed for headaches 20 tablet 0    cetirizine (ZyrTEC) 10 mg tablet Take 20 mg by mouth 2 (two) times a day       cholecalciferol (VITAMIN D) 400 units/mL Take 2 5 mL (1,000 Units total) by mouth daily 1 Bottle 5    clonazePAM (KlonoPIN) 1 mg tablet One p o  Q h s  30 tablet 2    diphenhydrAMINE (BENADRYL) 25 mg tablet Take 50 mg by mouth as needed for itching       EPINEPHrine (EPIPEN 2-MARYSOL IJ) EpiPen   prn      Fexofenadine HCl (MUCINEX ALLERGY PO) Take by mouth      fluconazole (DIFLUCAN) 100 mg tablet Take 2 tabs po on day 1, then 1 tab po for 14 days, then 1 tab po daily on M,W,F 90 tablet 0    furosemide (LASIX) 20 mg tablet Take 20 mg by mouth daily as needed       hydrOXYzine HCL (ATARAX) 25 mg tablet       inFLIXimab (REMICADE IV) 700 mg Every 6 weeks      levothyroxine (LEVOXYL) 50 mcg tablet Levoxyl 50 mcg tablet      LORazepam (ATIVAN) 1 mg tablet Take 1 tablet (1 mg total) by mouth every 8 (eight) hours as needed for anxiety 21 tablet 0  montelukast (SINGULAIR) 10 mg tablet Take 1 tablet (10 mg total) by mouth 2 (two) times a day 60 tablet 3    omalizumab (XOLAIR) 150 mg Inject 300 mg under the skin every 28 days       ondansetron (ZOFRAN) 4 mg tablet Take 1 tablet (4 mg total) by mouth every 8 (eight) hours as needed for nausea or vomiting 20 tablet 0    pantoprazole (PROTONIX) 40 mg tablet Take 40 mg by mouth 2 (two) times a day       polyethylene glycol (MIRALAX) 17 g packet Take 17 g by mouth 2 (two) times a day 14 each 0    predniSONE 10 mg tablet 4 tabs po daily x1 week, then 3&1/2 tab po qd x 1 week, then 3 tabs po qd x 1 week, then 2&1/2 tabs po qd x 1 week then 2 tabs qd afterwards (Patient taking differently: 20 mg 4 tabs po daily x1 week, then 3&1/2 tab po qd x 1 week, then 3 tabs po qd x 1 week, then 2&1/2 tabs po qd x 1 week then 2 tabs qd afterwards) 101 tablet 0    Probiotic Product (PROBIOTIC DAILY PO) Probiotic   floistar      ranitidine (ZANTAC) 150 MG capsule Take 300 mg by mouth 2 (two) times a day       temazepam (RESTORIL) 30 mg capsule Take 1 capsule (30 mg total) by mouth daily at bedtime 30 capsule 0    traMADol (ULTRAM) 50 mg tablet Take 1 tablet (50 mg total) by mouth every 6 (six) hours as needed for moderate pain or severe pain 10 tablet 0     Current Facility-Administered Medications   Medication Dose Route Frequency Provider Last Rate Last Dose    cyanocobalamin injection 1,000 mcg  1,000 mcg Intramuscular Q30 Days Anthony Campbell PA-C   1,000 mcg at 09/24/19 0653        Past Medical History:   Diagnosis Date    Anxiety     Asthma     Chronic kidney disease     Deep vein thrombosis (HCC)     Disease of thyroid gland     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    Generalized anxiety disorder 8/26/2017    Heart murmur     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HCC)     Migraine     Myocardial infarction ([facility] [address] )     NSTEMI  pt denies, documented in cardio note    Pancreatitis     sphinger of     Psychiatric disorder     RA (rheumatoid arthritis) ([facility] [address] )     Ulcerative colitis ([facility] [address] )         Past Surgical History:   Procedure Laterality Date    APPENDECTOMY      CHOLECYSTECTOMY      EGD AND COLONOSCOPY N/A 9/12/2017    Procedure: EGD AND COLONOSCOPY;  Surgeon: Boaz Zavala MD;  [facility]: [facility]; Service: Gastroenterology    ERCP N/A 9/15/2017    Procedure: ENDOSCOPIC RETROGRADE CHOLANGIOPANCREATOGRAPHY (ERCP); Surgeon: Richy Junior MD;  [facility];   Service: Gastroenterology    HYSTERECTOMY      KNEE SURGERY Right     LAPAROSCOPIC ENDOMETRIOSIS FULGURATION      ORIF ANKLE FRACTURE Left     AZ KNEE SCOPE,MED/LAT MENISECTOMY Left 5/12/2017    Procedure: POSSIBLE MEDIAL MENISECTOMY;  Surgeon: Reta Hammond MD;  [facility]: [facility];  Service: Orthopedics    [address] [place] [address] Left 5/12/2017    Procedure: KNEE ARTHROSCOPY EVALUATION, CHONDROPLASTY;  Surgeon: Reta Hammond MD;  [facility];  Service: Orthopedics    AZ LAP,DIAGNOSTIC ABDOMEN N/A 12/12/2017    Procedure: LAPAROSCOPY DIAGNOSTIC  WITH LYSIS OF ADHESIONS;  Surgeon: Shawnee Agarwal DO;  [facility];  Service: General        Social History     Socioeconomic History    Marital status: /Civil Union     Spouse name: Not on file    Number of children: Not on file    Years of education: Not on file    Highest education level: Not on file   Occupational History    Not on file   Social Needs    Financial resource strain: Not on file    Food insecurity:     Worry: Not on file     Inability: Not on file    Transportation needs:     Medical: Not on file     Non-medical: Not on file   Tobacco Use    Smoking status: Never Smoker    Smokeless tobacco: Never Used   Substance and Sexual Activity    Alcohol use: Never     Frequency: Never     Binge frequency: Never    Drug use: Not Currently    Sexual activity: Yes     Partners: Male   Lifestyle    Physical activity:     Days per week: Not on file     Minutes per session: Not on file    Stress: Not on file   Relationships    Social connections:     Talks on phone: Not on file     Gets together: Not on file     Attends Jew service: Not on file     Active member of club or organization: Not on file     Attends meetings of clubs or organizations: Not on file     Relationship status: Not on file    Intimate partner violence:     Fear of current or ex partner: Not on file     Emotionally abused: Not on file     Physically abused: Not on file     Forced sexual activity: Not on file   Other Topics Concern    Not on file   Social History Narrative    Not on file        Review of Systems   Constitutional: Negative  HENT: Positive for mouth sores and trouble swallowing  Chronic thrush   Eyes: Negative for photophobia and visual disturbance  Respiratory: Negative  Cardiovascular: Negative  Gastrointestinal: Negative  Neurological: Negative  Objective:      /76   Pulse 95   Temp (!) 97 2 °F (36 2 °C)   Ht 5' 5" (1 651 m)   Wt 59 9 kg (132 lb)   SpO2 98%   BMI 21 97 kg/m²          Physical Exam   Constitutional: She is oriented to person, place, and time  She appears well-developed and well-nourished  HENT:   Head: Normocephalic and atraumatic  Right Ear: External ear normal    Left Ear: External ear normal    Nose: Nose normal    Mouth/Throat: Uvula is midline  No oropharyngeal exudate or posterior oropharyngeal edema  Eyes: Pupils are equal, round, and reactive to light  Neck: Neck supple  Cardiovascular: Normal rate and regular rhythm  Pulmonary/Chest: Effort normal and breath sounds normal    Abdominal: Soft  Bowel sounds are normal    Musculoskeletal: She exhibits no edema  Neurological: She is alert and oriented to person, place, and time  Skin: Skin is warm and dry  Capillary refill takes less than 2 seconds  Psychiatric: She has a normal mood and affect

## 2019-10-27 LAB
ASO AB TITR SER LA: NORMAL {TITER}
BACTERIA THROAT CULT: NORMAL
HIV 1+2 AB+HIV1 P24 AG SERPL QL IA: NORMAL

## 2019-10-28 LAB
1,25(OH)2D3 SERPL-MCNC: 89.5 PG/ML (ref 19.9–79.3)
B BURGDOR IGG+IGM SER-ACNC: <0.91 ISR (ref 0–0.9)
EBV EA IGG SER-ACNC: 18.4 U/ML (ref 0–8.9)
EBV NA IGG SER IA-ACNC: >600 U/ML (ref 0–17.9)
EBV PATRN SPEC IB-IMP: ABNORMAL
EBV VCA IGG SER IA-ACNC: >600 U/ML (ref 0–17.9)
EBV VCA IGM SER IA-ACNC: <36 U/ML (ref 0–35.9)
LDH SERPL-CCNC: 165 IU/L (ref 119–226)
LDH1 CFR SERPL ELPH: 29 % (ref 17–32)
LDH2 CFR SERPL ELPH: 35 % (ref 25–40)
LDH3 CFR SERPL ELPH: 22 % (ref 17–27)
LDH4 CFR SERPL ELPH: 8 % (ref 5–13)
LDH5 CFR SERPL ELPH: 6 % (ref 4–20)

## 2019-10-29 LAB
ACTH PLAS-MCNC: 7.2 PG/ML (ref 7.2–63.3)
ZINC SERPL-MCNC: 94 UG/DL (ref 56–134)

## 2019-10-30 DIAGNOSIS — D89.40 MAST CELL ACTIVATION SYNDROME (HCC): ICD-10-CM

## 2019-10-30 DIAGNOSIS — D84.9 IMMUNOCOMPROMISED STATE (HCC): ICD-10-CM

## 2019-10-30 DIAGNOSIS — B99.9 RECURRENT INFECTIONS: Primary | ICD-10-CM

## 2019-10-30 DIAGNOSIS — B37.0 THRUSH: ICD-10-CM

## 2019-10-30 NOTE — PROGRESS NOTES
Diagnoses and all orders for this visit:    Recurrent infections  -     Ambulatory referral to Infectious Disease; Future    Immunocompromised state ([facility] )  -     Ambulatory referral to Infectious Disease; Future    Thrush  -     Ambulatory referral to Infectious Disease; Future    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[facility] )  -     Ambulatory referral to Infectious Disease; Future      Labs and case reviewed with supervising physician  Refer to ID for further evaluation and management of recurrent thrush/infections  Flagyl not indicated at this time  On PO diflucan- suppressive therapy - since 10/11/19    Seek ED if sx worsen

## 2019-10-31 DIAGNOSIS — Z86.19 PERSONAL HISTORY OF SEPSIS: ICD-10-CM

## 2019-10-31 DIAGNOSIS — K52.9 IBD (INFLAMMATORY BOWEL DISEASE): ICD-10-CM

## 2019-10-31 DIAGNOSIS — D84.9 IMMUNOCOMPROMISED STATE (HCC): ICD-10-CM

## 2019-10-31 DIAGNOSIS — B99.9 RECURRENT INFECTIONS: ICD-10-CM

## 2019-10-31 DIAGNOSIS — D89.40 MAST CELL ACTIVATION SYNDROME (HCC): ICD-10-CM

## 2019-10-31 DIAGNOSIS — Z86.19 HISTORY OF THRUSH: Primary | ICD-10-CM

## 2019-10-31 NOTE — PROGRESS NOTES
Diagnoses and all orders for this visit:    History of thrush  -     Fungal culture; Future  -     Ambulatory referral to Infectious Disease; Future    Recurrent infections  -     Ambulatory referral to Infectious Disease; Future    Personal history of sepsis  -     Ambulatory referral to Infectious Disease; Future    Immunocompromised state ([facility] )  -     Ambulatory referral to Infectious Disease; Future    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[facility] )  -     Ambulatory referral to Infectious Disease; Future    IBD (inflammatory bowel disease)  -     Ambulatory referral to Infectious Disease; Future      Discussed further with patient and supervising physician  Fungal culture ordered for throat to r/o candida  F/U infectious disease for further evaluation and management of recurrent thrush/infections, along with hospitalization for sepsis in patient with complex PMHx  Chronically immunocompromised secondary to Remecade infusions- has Mast Cell Activation syndrome and IBD

## 2019-11-01 ENCOUNTER — TELEPHONE (OUTPATIENT)
Dept: FAMILY MEDICINE CLINIC | Facility: CLINIC | Age: 44
End: 2019-11-01

## 2019-11-01 ENCOUNTER — APPOINTMENT (OUTPATIENT)
Dept: LAB | Facility: HOSPITAL | Age: 44
End: 2019-11-01
Payer: COMMERCIAL

## 2019-11-01 DIAGNOSIS — E86.0 DEHYDRATION: ICD-10-CM

## 2019-11-01 DIAGNOSIS — K52.9 IBD (INFLAMMATORY BOWEL DISEASE): ICD-10-CM

## 2019-11-01 DIAGNOSIS — D75.839 THROMBOCYTOSIS: ICD-10-CM

## 2019-11-01 DIAGNOSIS — R30.0 DYSURIA: ICD-10-CM

## 2019-11-01 DIAGNOSIS — Z86.19 HISTORY OF CANDIDIASIS: Primary | ICD-10-CM

## 2019-11-01 DIAGNOSIS — F41.9 ANXIETY: ICD-10-CM

## 2019-11-01 DIAGNOSIS — Z86.19 HISTORY OF CANDIDIASIS: ICD-10-CM

## 2019-11-01 DIAGNOSIS — E55.9 VITAMIN D DEFICIENCY: ICD-10-CM

## 2019-11-01 DIAGNOSIS — K62.89 PROCTITIS: ICD-10-CM

## 2019-11-01 DIAGNOSIS — K51.911 EXACERBATION OF ULCERATIVE COLITIS WITH RECTAL BLEEDING (HCC): ICD-10-CM

## 2019-11-01 DIAGNOSIS — Z86.19 HISTORY OF THRUSH: ICD-10-CM

## 2019-11-01 LAB
ALBUMIN SERPL BCP-MCNC: 3.6 G/DL (ref 3.5–5)
ALP SERPL-CCNC: 40 U/L (ref 46–116)
ALT SERPL W P-5'-P-CCNC: 34 U/L (ref 12–78)
ANION GAP SERPL CALCULATED.3IONS-SCNC: 10 MMOL/L (ref 4–13)
AST SERPL W P-5'-P-CCNC: 34 U/L (ref 5–45)
BACTERIA UR QL AUTO: ABNORMAL /HPF
BASOPHILS # BLD AUTO: 0.09 THOUSANDS/ΜL (ref 0–0.1)
BASOPHILS NFR BLD AUTO: 2 % (ref 0–1)
BILIRUB SERPL-MCNC: 0.5 MG/DL (ref 0.2–1)
BILIRUB UR QL STRIP: NEGATIVE
BUN SERPL-MCNC: 9 MG/DL (ref 5–25)
CALCIUM SERPL-MCNC: 8.8 MG/DL (ref 8.3–10.1)
CHLORIDE SERPL-SCNC: 103 MMOL/L (ref 100–108)
CLARITY UR: CLEAR
CO2 SERPL-SCNC: 26 MMOL/L (ref 21–32)
COLOR UR: YELLOW
CREAT SERPL-MCNC: 0.92 MG/DL (ref 0.6–1.3)
CRP SERPL QL: <0.5 MG/L
EOSINOPHIL # BLD AUTO: 0.09 THOUSAND/ΜL (ref 0–0.61)
EOSINOPHIL NFR BLD AUTO: 2 % (ref 0–6)
ERYTHROCYTE [DISTWIDTH] IN BLOOD BY AUTOMATED COUNT: 12.8 % (ref 11.6–15.1)
EST. AVERAGE GLUCOSE BLD GHB EST-MCNC: 105 MG/DL
FERRITIN SERPL-MCNC: 48 NG/ML (ref 8–388)
GFR SERPL CREATININE-BSD FRML MDRD: 75 ML/MIN/1.73SQ M
GLUCOSE SERPL-MCNC: 216 MG/DL (ref 65–140)
GLUCOSE UR STRIP-MCNC: ABNORMAL MG/DL
HBA1C MFR BLD: 5.3 % (ref 4.2–6.3)
HCT VFR BLD AUTO: 46.9 % (ref 34.8–46.1)
HGB BLD-MCNC: 15.2 G/DL (ref 11.5–15.4)
HGB UR QL STRIP.AUTO: NEGATIVE
IMM GRANULOCYTES # BLD AUTO: 0.02 THOUSAND/UL (ref 0–0.2)
IMM GRANULOCYTES NFR BLD AUTO: 0 % (ref 0–2)
IRON SATN MFR SERPL: 54 %
IRON SERPL-MCNC: 190 UG/DL (ref 50–170)
KETONES UR STRIP-MCNC: NEGATIVE MG/DL
LDH SERPL-CCNC: 158 U/L (ref 81–234)
LEUKOCYTE ESTERASE UR QL STRIP: ABNORMAL
LYMPHOCYTES # BLD AUTO: 1.66 THOUSANDS/ΜL (ref 0.6–4.47)
LYMPHOCYTES NFR BLD AUTO: 37 % (ref 14–44)
MAGNESIUM SERPL-MCNC: 1.9 MG/DL (ref 1.6–2.6)
MCH RBC QN AUTO: 32.3 PG (ref 26.8–34.3)
MCHC RBC AUTO-ENTMCNC: 32.4 G/DL (ref 31.4–37.4)
MCV RBC AUTO: 100 FL (ref 82–98)
MONOCYTES # BLD AUTO: 0.36 THOUSAND/ΜL (ref 0.17–1.22)
MONOCYTES NFR BLD AUTO: 8 % (ref 4–12)
MUCOUS THREADS UR QL AUTO: ABNORMAL
NEUTROPHILS # BLD AUTO: 2.28 THOUSANDS/ΜL (ref 1.85–7.62)
NEUTS SEG NFR BLD AUTO: 51 % (ref 43–75)
NITRITE UR QL STRIP: NEGATIVE
NON-SQ EPI CELLS URNS QL MICRO: ABNORMAL /HPF
NRBC BLD AUTO-RTO: 0 /100 WBCS
PH UR STRIP.AUTO: 6.5 [PH]
PHOSPHATE SERPL-MCNC: 2.5 MG/DL (ref 2.7–4.5)
PLATELET # BLD AUTO: 304 THOUSANDS/UL (ref 149–390)
PMV BLD AUTO: 9.4 FL (ref 8.9–12.7)
POTASSIUM SERPL-SCNC: 3.9 MMOL/L (ref 3.5–5.3)
PROT SERPL-MCNC: 7.8 G/DL (ref 6.4–8.2)
PROT UR STRIP-MCNC: NEGATIVE MG/DL
RBC # BLD AUTO: 4.71 MILLION/UL (ref 3.81–5.12)
RBC #/AREA URNS AUTO: ABNORMAL /HPF
SODIUM SERPL-SCNC: 139 MMOL/L (ref 136–145)
SP GR UR STRIP.AUTO: 1.01 (ref 1–1.03)
TIBC SERPL-MCNC: 355 UG/DL (ref 250–450)
UROBILINOGEN UR QL STRIP.AUTO: 0.2 E.U./DL
WBC # BLD AUTO: 4.5 THOUSAND/UL (ref 4.31–10.16)
WBC #/AREA URNS AUTO: ABNORMAL /HPF

## 2019-11-01 PROCEDURE — 86140 C-REACTIVE PROTEIN: CPT

## 2019-11-01 PROCEDURE — 87102 FUNGUS ISOLATION CULTURE: CPT

## 2019-11-01 PROCEDURE — 85025 COMPLETE CBC W/AUTO DIFF WBC: CPT

## 2019-11-01 PROCEDURE — 83540 ASSAY OF IRON: CPT

## 2019-11-01 PROCEDURE — 87086 URINE CULTURE/COLONY COUNT: CPT

## 2019-11-01 PROCEDURE — 83036 HEMOGLOBIN GLYCOSYLATED A1C: CPT

## 2019-11-01 PROCEDURE — 84100 ASSAY OF PHOSPHORUS: CPT

## 2019-11-01 PROCEDURE — 83615 LACTATE (LD) (LDH) ENZYME: CPT

## 2019-11-01 PROCEDURE — 82652 VIT D 1 25-DIHYDROXY: CPT

## 2019-11-01 PROCEDURE — 83550 IRON BINDING TEST: CPT

## 2019-11-01 PROCEDURE — 82728 ASSAY OF FERRITIN: CPT

## 2019-11-01 PROCEDURE — 36415 COLL VENOUS BLD VENIPUNCTURE: CPT

## 2019-11-01 PROCEDURE — 80053 COMPREHEN METABOLIC PANEL: CPT

## 2019-11-01 PROCEDURE — 81270 JAK2 GENE: CPT

## 2019-11-01 PROCEDURE — 83735 ASSAY OF MAGNESIUM: CPT

## 2019-11-01 PROCEDURE — 81001 URINALYSIS AUTO W/SCOPE: CPT | Performed by: FAMILY MEDICINE

## 2019-11-01 NOTE — TELEPHONE ENCOUNTER
Patient called stating that she is in need of a refill on her Lorazepam  She states she is going away in two days  She stated at the time of her last office appointment with you that she did not needs this prescription, but that you would fill it when she needed it  Is this something that can be filled? Also she stated she stopped taking the temazepam 3 days ago

## 2019-11-01 NOTE — TELEPHONE ENCOUNTER
Per my review, patient has an active prescription for clonazepam currently  I do not recommend taking these at the same time  Patient can take the clonazepam for now  I would check with her PCP or previous provider for further refills  Please inform the patient  Thanks!

## 2019-11-02 LAB — BACTERIA UR CULT: NORMAL

## 2019-11-04 LAB — 1,25(OH)2D3 SERPL-MCNC: 26.1 PG/ML (ref 19.9–79.3)

## 2019-11-05 ENCOUNTER — TELEPHONE (OUTPATIENT)
Dept: FAMILY MEDICINE CLINIC | Facility: CLINIC | Age: 44
End: 2019-11-05

## 2019-11-05 NOTE — TELEPHONE ENCOUNTER
Pt called stating that she needs a med refill for Lorazepam  PT only has 3 pills left and patient states that she was promised a refill at her last visit with Dr Fiona Traylor  Pt is leaving for [place] on Sunday and will be there for a month  PT is very unhappy because she states that people who are taking messages are not take documenting correctly  Pt also states that she will be taking a recorder into exam room with her to document her visits from now on  Pt was given management's number during her phone call this morning due to her anxiety with not getting her medication refilled  Pt also asked about the latest lab work that was done on 11/1/2019  PT was informed that not all tests came back and provider is out of the office today for physicals at [place] and once I am tasked on results patient will be notified as soon as possible

## 2019-11-07 ENCOUNTER — TELEPHONE (OUTPATIENT)
Dept: FAMILY MEDICINE CLINIC | Facility: CLINIC | Age: 44
End: 2019-11-07

## 2019-11-13 LAB — SCAN RESULT: NORMAL

## 2019-11-15 ENCOUNTER — DOCUMENTATION (OUTPATIENT)
Dept: HEMATOLOGY ONCOLOGY | Facility: CLINIC | Age: 44
End: 2019-11-15

## 2019-11-15 NOTE — PROGRESS NOTES
Faxed Jak2 results to [facility] fax # [phone]  Confirmation fax rec'd

## 2019-11-28 NOTE — TELEPHONE ENCOUNTER
Waiting for supervising physician to review/additional management  oriented to person, place and time

## 2019-12-02 ENCOUNTER — TELEPHONE (OUTPATIENT)
Dept: SLEEP CENTER | Facility: CLINIC | Age: 44
End: 2019-12-02

## 2019-12-02 LAB — FUNGUS SPEC CULT: NORMAL

## 2019-12-02 NOTE — TELEPHONE ENCOUNTER
Patient called she wants to back on the temazepam, patient reports the clonazepam is not working      Please advise

## 2019-12-03 NOTE — TELEPHONE ENCOUNTER
Patient called again- states she is completely out of clonazepam now  She did not sleep at all last night  She would like a 3 month supply of [place]am [place] [facility]    Please advise

## 2019-12-04 DIAGNOSIS — F51.04 PSYCHOPHYSIOLOGICAL INSOMNIA: Primary | ICD-10-CM

## 2019-12-04 RX ORDER — TEMAZEPAM 30 MG/1
30 CAPSULE ORAL
Qty: 30 CAPSULE | Refills: 3 | Status: SHIPPED | OUTPATIENT
Start: 2019-12-04 | End: 2020-04-03 | Stop reason: SDUPTHER

## 2019-12-05 ENCOUNTER — OFFICE VISIT (OUTPATIENT)
Dept: GASTROENTEROLOGY | Facility: MEDICAL CENTER | Age: 44
End: 2019-12-05
Payer: COMMERCIAL

## 2019-12-05 VITALS
BODY MASS INDEX: 23.89 KG/M2 | TEMPERATURE: 98.2 F | SYSTOLIC BLOOD PRESSURE: 100 MMHG | DIASTOLIC BLOOD PRESSURE: 68 MMHG | WEIGHT: 143.4 LBS | HEIGHT: 65 IN | HEART RATE: 78 BPM

## 2019-12-05 DIAGNOSIS — D84.9 IMMUNOSUPPRESSED STATUS (HCC): ICD-10-CM

## 2019-12-05 DIAGNOSIS — E44.1 MILD PROTEIN-CALORIE MALNUTRITION (HCC): ICD-10-CM

## 2019-12-05 DIAGNOSIS — K50.119 CROHN'S DISEASE OF COLON WITH COMPLICATION (HCC): Primary | ICD-10-CM

## 2019-12-05 PROCEDURE — 99215 OFFICE O/P EST HI 40 MIN: CPT | Performed by: INTERNAL MEDICINE

## 2019-12-05 RX ORDER — KETOTIFEN FUMARATE 100 %
1 POWDER (GRAM) MISCELLANEOUS 2 TIMES DAILY
COMMUNITY
End: 2020-11-03 | Stop reason: ALTCHOICE

## 2019-12-05 NOTE — PROGRESS NOTES
=  [facility] - Outpatient Follow-up Note  Tadeo Cao 53 y o  female MRN: [id number]  Encounter: [id number]          ASSESSMENT AND PLAN:    Tadeo Cao is a 53 y o  female who complex medical history including long-standing IBS (Crohn's colitis vs UC), MCAS, possible immunodeficiency on IVIG, multiple episodes of sepsis who presents to discuss her IBD  At this time she is feeling somewhat well and she wishes to defer making treatment decisions  She would like to see how she feels on her IVIG  Available labs and imaging reviewed  1  Crohn's disease of colon with complication ([facility] )    2  Mild protein-calorie malnutrition ([facility] )    3  Immunosuppressed status ([facility] )      No orders of the defined types were placed in this encounter  -- Okay to conitnue Miralax once a day  -- Follow-up final recs from [facility]  -- We again discussed treatment options, including Entyvio vs Stelara (she declines any anti-TNF agents) but she does not wish to make a decision yet and will continue to think about her treatment options  We discussed the risks and benefits of the different alternatives  -- Follow-up with Dr Winter Robles  -- Close follow-up    I have spent 40 minutes with Patient  today in which greater than 50% of this time was spent in counseling/coordination of care regarding Diagnostic results, Prognosis, Risks and benefits of tx options and Intructions for management         ______________________________________________________________________    SUBJECTIVE:    Tadeo Cao is a 53 y o  female who presents with complex medical history, including long-standing IBD/UC (for > 20 years), MCAS and possible immunodeficiency (for which she receives IVIG), multiple episodes of sepsis, cholecystectomy (2009), hypothyroidism who presents to establish outpatient care for UC  Since last visit she went to the [facility]  She had a bone marrow biopsy  She is now taking Miralax once a day  Last bloody stool was 1 5 weeks ago  In the past week her BMs were very inconsistent  Now 1-5 BMs per day, and they can be soft and semi-formed  + urgency  She had an episode of incontinence after she took the Miralax (small accident)  She passed gas and some stool came out  She had some RUQ pain in the end of November, but in the past 1-2 weeks just one tender spot in the LLQ  Still having rash from the lidocaine and leads  REVIEW OF SYSTEMS IS OTHERWISE NEGATIVE  Historical Information   Past Medical History:   Diagnosis Date    Anxiety     Asthma     Chronic kidney disease     Deep vein thrombosis (HCC)     Disease of thyroid gland     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    Generalized anxiety disorder 8/26/2017    Heart murmur     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HCC)     Migraine     Myocardial infarction ([facility] )     NSTEMI  pt denies, documented in cardio note    Pancreatitis     sphinger of     Psychiatric disorder     RA (rheumatoid arthritis) ([facility] )     Ulcerative colitis ([facility] )      Past Surgical History:   Procedure Laterality Date    APPENDECTOMY      CHOLECYSTECTOMY      EGD AND COLONOSCOPY N/A 9/12/2017    Procedure: EGD AND COLONOSCOPY;  Surgeon: Karuna Castle MD;  [facility]: [facility]; Service: Gastroenterology    ERCP N/A 9/15/2017    Procedure: ENDOSCOPIC RETROGRADE CHOLANGIOPANCREATOGRAPHY (ERCP); Surgeon: Lelo Cano MD;  [facility];   Service: Gastroenterology    HYSTERECTOMY      KNEE SURGERY Right     LAPAROSCOPIC ENDOMETRIOSIS FULGURATION      ORIF ANKLE FRACTURE Left     SC KNEE SCOPE,MED/LAT MENISECTOMY Left 5/12/2017    Procedure: POSSIBLE MEDIAL MENISECTOMY;  Surgeon: Jeanine Romero MD;  [facility]: [facility];  Service: Orthopedics    SC KNEE [address] CART Left 5/12/2017    Procedure: KNEE ARTHROSCOPY EVALUATION, CHONDROPLASTY;  Surgeon: Jeanine Romero MD;  [facility]: [facility];  Service: Orthopedics    SC LAP,DIAGNOSTIC ABDOMEN N/A 12/12/2017    Procedure: LAPAROSCOPY DIAGNOSTIC  WITH LYSIS OF ADHESIONS;  Surgeon: Matt Gutierrez DO;  [facility];  Service: General     Social History   Social History     Substance and Sexual Activity   Alcohol Use Never    Frequency: Never    Binge frequency: Never     Social History     Substance and Sexual Activity   Drug Use Not Currently     Social History     Tobacco Use   Smoking Status Never Smoker   Smokeless Tobacco Never Used     Family History   Problem Relation Age of Onset    Ulcerative colitis Mother     Heart failure Father     Neuropathy Father     Macular degeneration Father     Diabetes Father     Asthma Daughter     Hypothyroidism Daughter     Heart disease Maternal Grandmother     Arthritis Maternal Grandmother     Arthritis Paternal Grandmother     Macular degeneration Paternal Grandmother     Lymphoma Paternal Grandfather     Heart failure Paternal Aunt     Heart failure Paternal [de-identified]     Breast cancer Paternal Aunt 47    Breast cancer additional onset Paternal Aunt 62    Hypothyroidism Paternal Aunt     Breast cancer Maternal Aunt     No Known Problems Maternal Grandfather        Meds/Allergies       Current Outpatient Medications:     aspirin 81 mg chewable tablet    butalbital-acetaminophen-caffeine (FIORICET,ESGIC) -40 mg per tablet    cetirizine (ZyrTEC) 10 mg tablet    cholecalciferol (VITAMIN D) 400 units/mL    diphenhydrAMINE (BENADRYL) 25 mg tablet    EPINEPHrine (EPIPEN 2-MARYSOL IJ)    Fexofenadine HCl (MUCINEX ALLERGY PO)    fluconazole (DIFLUCAN) 100 mg tablet    furosemide (LASIX) 20 mg tablet    hydrOXYzine HCL (ATARAX) 25 mg tablet    inFLIXimab (REMICADE IV)    Ketotifen Fumarate POWD    levothyroxine (LEVOXYL) 50 mcg tablet    LORazepam (ATIVAN) 1 mg tablet    montelukast (SINGULAIR) 10 mg tablet    omalizumab (XOLAIR) 150 mg    ondansetron (ZOFRAN) 4 mg tablet    pantoprazole (PROTONIX) 40 mg tablet    polyethylene glycol (MIRALAX) 17 g packet    predniSONE 10 mg tablet    Probiotic Product (PROBIOTIC DAILY PO)    ranitidine (ZANTAC) 150 MG capsule    temazepam (RESTORIL) 30 mg capsule    traMADol (ULTRAM) 50 mg tablet    Current Facility-Administered Medications:     cyanocobalamin injection 1,000 mcg, 1,000 mcg, Intramuscular, Q30 Days, 1,000 mcg at 10/25/19 1204    Allergies   Allergen Reactions    Amitriptyline Anaphylaxis     According to the patient she had nphylaxis    Aspergillus Fumigatus Other (See Comments), Hives and Swelling    Azathioprine Other (See Comments) and Anaphylaxis     pancreatitis  According to patient she needed Epipen    Eggs Or Egg-Derived Products Anaphylaxis    Sumatriptan Anaphylaxis     Bradycardia, sob, back pressure, head pain,throat tightness  According to the patient she had anphylaxis    Contrast [Iodinated Diagnostic Agents]      Pt states needs to be premedicated prior to injection    Corn Dextrin      Any corn based products    Corn Oil Hives    Gelatin     Histamine      Intolerance      Ibuprofen Hives and Throat Swelling    Malto Dextrin [Dextrin]     Other      Gel caps, maltodextrose, dextrose,grapes    Penicillium Notatum     Sulfa Antibiotics Hives and Other (See Comments)    Sulfate     Sulfites            Objective     Blood pressure 100/68, pulse 78, temperature 98 2 °F (36 8 °C), height 5' 5 3" (1 659 m), weight 65 kg (143 lb 6 4 oz), not currently breastfeeding  Body mass index is 23 64 kg/m²  PHYSICAL EXAM:      General Appearance:   Alert, cooperative, no distress   HEENT:   Normocephalic, atraumatic, anicteric  Neck:  Supple, symmetrical, trachea midline   Lungs:   Clear to auscultation bilaterally; no rales, rhonchi or wheezing; respirations unlabored    Heart[de-identified]   Regular rate and rhythm; no murmur, rub, or gallop     Abdomen:   Soft, non-tender, non-distended; normal bowel sounds; no masses, no organomegaly    Genitalia:   Deferred Rectal:   Deferred    Extremities:  No cyanosis, clubbing or edema    Pulses:  2+ and symmetric    Skin:  No jaundice, rashes, or lesions    Lymph nodes:  No palpable cervical lymphadenopathy        Lab Results:   No visits with results within 1 Day(s) from this visit     Latest known visit with results is:   Appointment on 11/01/2019   Component Date Value    Sodium 11/01/2019 139     Potassium 11/01/2019 3 9     Chloride 11/01/2019 103     CO2 11/01/2019 26     ANION GAP 11/01/2019 10     BUN 11/01/2019 9     Creatinine 11/01/2019 0 92     Glucose 11/01/2019 216*    Calcium 11/01/2019 8 8     AST 11/01/2019 34     ALT 11/01/2019 34     Alkaline Phosphatase 11/01/2019 40*    Total Protein 11/01/2019 7 8     Albumin 11/01/2019 3 6     Total Bilirubin 11/01/2019 0 50     eGFR 11/01/2019 75     Magnesium 11/01/2019 1 9     Phosphorus 11/01/2019 2 5*    Vit D, 1,25-Dihydroxy 11/01/2019 26 1     WBC 11/01/2019 4 50     RBC 11/01/2019 4 71     Hemoglobin 11/01/2019 15 2     Hematocrit 11/01/2019 46 9*    MCV 11/01/2019 100*    MCH 11/01/2019 32 3     MCHC 11/01/2019 32 4     RDW 11/01/2019 12 8     MPV 11/01/2019 9 4     Platelets 68/21/4807 304     nRBC 11/01/2019 0     Neutrophils Relative 11/01/2019 51     Immat GRANS % 11/01/2019 0     Lymphocytes Relative 11/01/2019 37     Monocytes Relative 11/01/2019 8     Eosinophils Relative 11/01/2019 2     Basophils Relative 11/01/2019 2*    Neutrophils Absolute 11/01/2019 2 28     Immature Grans Absolute 11/01/2019 0 02     Lymphocytes Absolute 11/01/2019 1 66     Monocytes Absolute 11/01/2019 0 36     Eosinophils Absolute 11/01/2019 0 09     Basophils Absolute 11/01/2019 0 09     CRP 11/01/2019 <0 5     Scan Result 11/01/2019 SEE WRITTEN REPORT     LD 11/01/2019 158     Hemoglobin A1C 11/01/2019 5 3     EAG 11/01/2019 105     Iron Saturation 11/01/2019 54     TIBC 11/01/2019 355     Iron 11/01/2019 190*    Ferritin 11/01/2019 48     Urine Culture 11/01/2019 No Growth <1000 cfu/mL     Fungus (Mycology) Culture 11/01/2019 No Fungus Isolated at 4 Weeks          Radiology Results:   No results found

## 2019-12-20 ENCOUNTER — TELEPHONE (OUTPATIENT)
Dept: CARDIOLOGY CLINIC | Facility: HOSPITAL | Age: 44
End: 2019-12-20

## 2019-12-20 NOTE — TELEPHONE ENCOUNTER
Attempted to contact Alyssa Shaver multiple times to schedule her appointment with cardiology  A letter has been sent to have her contact our office

## 2019-12-30 ENCOUNTER — OFFICE VISIT (OUTPATIENT)
Dept: GYNECOLOGY | Facility: CLINIC | Age: 44
End: 2019-12-30
Payer: COMMERCIAL

## 2019-12-30 VITALS
HEART RATE: 94 BPM | WEIGHT: 142 LBS | DIASTOLIC BLOOD PRESSURE: 72 MMHG | HEIGHT: 65 IN | BODY MASS INDEX: 23.66 KG/M2 | SYSTOLIC BLOOD PRESSURE: 106 MMHG

## 2019-12-30 DIAGNOSIS — Z01.419 ENCOUNTER FOR GYNECOLOGICAL EXAMINATION (GENERAL) (ROUTINE) WITHOUT ABNORMAL FINDINGS: Primary | ICD-10-CM

## 2019-12-30 PROCEDURE — 99386 PREV VISIT NEW AGE 40-64: CPT | Performed by: OBSTETRICS & GYNECOLOGY

## 2019-12-30 NOTE — PROGRESS NOTES
Assessment/Plan:    While patient was in the office, we discussed that given the length of time she has been experiencing symptoms under left axilla, and given normal mammogram, it is unlikely that her sx represent breast cancer  Patient is tearful and expresses concern about a breast cancer diagnosis  I explained to patient that it would take some time to review the extensive med records at which time I would call and follow up with her  Upon review of medical records, as noted in HPI, there is no mention of lymphadenopathy  Awaiting to speak to patient to discuss plan of care  Given length of time spent on today's visit, 60 mins, patient agreeable to RTO to address low libido  RTO in one year or sooner PRN  Diagnoses and all orders for this visit:    Encounter for gynecological examination (general) (routine) without abnormal findings        Subjective:      Patient ID: Graham Pinon is a 53 y o  female  This patient presents for routine annual gyn exam  She is s/p hyst for endometriosis  She has a complicated medical history with multiple comorbidities including mast cell activation disorder, fibromyalgia, IBD/UC, immunodeficiency, multiple episodes of sepsis  She has a constellation of symptoms with unclear etiologies  She has seen multiple specialists at [facility] [address] and most recently at [facility] in [place] in November 2019 where patient was seen by Neurology who felt she had Central Sensitization Syndrome, Dermatology, Gastroenterology, Rheumatology, Immunology, Hematology where a bone marrow aspiration was negative for mastocytosis, hematologic myeloid or lymphoid neoplasm  Ultimately, there is a lack of a unifying diagnosis for her host of symptoms  She is frustrated by lack of diagnosis  She states that the majority of her 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 be 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      When asked her most bothersome sx at present, patient states she was advised to see a gyn to further evaluate axillary adenopathy  Upon review of the multitude of medical records including most recently from [facility], there is no mention of axillary lymphadenopathy in report or clinical exam that I could glean  The last note related to axillary concerns was 8/28/18 at which time patient was treated at [facility] for cellulitis under the Left axilla with follow up exam on 9/5/18 at PCP stating that cellulitis resolved but mass persisted  An ultrasound of the Left arm 9/11/18, revealed "linear focus of left axilla of unclear etiology  " Follow up with PCP on 10/5/18 describes no palpable mass remaining  Patient states she has experienced a swollen lymph node in left axillae since August 2018  It has reached the size of a golf ball with erythema and tenderness that persists today  Most recently she also began noticing a swollen lymph node under right axillae, the size of a lima bean with erythema and tenderness  She reports pain is so bad that she cannot wear a bra  Last mammography 6/26/19 was normal  Patient very worried and tearful as she has a maternal and paternal aunt with breast cancer and she is concerned that there is a cancer that is not being detected with mammography  She denies vaginal bleeding, discharge  She is sexually active with , but is concerned about lack of sexual desire  The following portions of the patient's history were reviewed and updated as appropriate: allergies, current medications, past family history, past medical history, past social history, past surgical history and problem list     Review of Systems   Constitutional: Positive for appetite change and fatigue  Respiratory: Negative  Cardiovascular: Negative  Gastrointestinal: Positive for constipation and diarrhea  Endocrine: Negative  Genitourinary: Negative for dyspareunia, dysuria, frequency, pelvic pain, urgency, vaginal bleeding, vaginal discharge and vaginal pain  Musculoskeletal: Positive for arthralgias, back pain and myalgias  Skin: Negative  Psychiatric/Behavioral: Positive for dysphoric mood (patient cries easily when discussing medical concerns)  The patient is nervous/anxious  Objective:      /72 (BP Location: Left arm, Patient Position: Sitting, Cuff Size: Standard)   Pulse 94   Ht 5' 5" (1 651 m)   Wt 64 4 kg (142 lb)   BMI 23 63 kg/m²          Physical Exam   Constitutional: She is oriented to person, place, and time  She appears well-developed and well-nourished  She is cooperative  HENT:   Head: Normocephalic and atraumatic  Neck: Normal range of motion  Neck supple  No thyroid mass and no thyromegaly present  Cardiovascular: Normal rate, regular rhythm and normal heart sounds  Pulmonary/Chest: Effort normal and breath sounds normal  Right breast exhibits no inverted nipple, no mass, no nipple discharge, no skin change and no tenderness  Left breast exhibits no inverted nipple, no mass, no nipple discharge, no skin change and no tenderness  No breast discharge  Breasts are symmetrical    No erythema or palpable masses note in either axilla  Positive diffuse tenderness noted with palpation of axilla B/L  Abdominal: Soft  Normal appearance and bowel sounds are normal  There is no hepatosplenomegaly  There is no tenderness  Hernia confirmed negative in the right inguinal area and confirmed negative in the left inguinal area  Genitourinary: Vagina normal  Rectal exam shows no external hemorrhoid  No breast discharge  Pelvic exam was performed with patient supine  No labial fusion  There is no rash, tenderness, lesion or injury on the right labia  There is no rash, tenderness, lesion or injury on the left labia  Right adnexum displays no mass, no tenderness and no fullness  Left adnexum displays no mass, no tenderness and no fullness  No erythema, tenderness or bleeding in the vagina  No signs of injury around the vagina   No vaginal discharge found  Genitourinary Comments: Urethra normal without lesions  No bladder tenderness  Cervix and uterus surgically absent, no masses or tenderness noted on BME  Musculoskeletal: Normal range of motion  Lymphadenopathy:     She has no axillary adenopathy  No inguinal adenopathy noted on the right or left side  Right: No inguinal adenopathy present  Left: No inguinal adenopathy present  Neurological: She is alert and oriented to person, place, and time  Skin: Skin is warm, dry and intact  Psychiatric: She has a normal mood and affect  Her speech is normal and behavior is normal  Cognition and memory are normal    Nursing note and vitals reviewed

## 2020-01-06 NOTE — ED NOTES
Addended by: Esther Miller on: 1/6/2020 11:21 AM     Modules accepted: Nhi Patient transported to [facility]  02/12/19 0320

## 2020-01-08 ENCOUNTER — TELEPHONE (OUTPATIENT)
Dept: GYNECOLOGY | Facility: CLINIC | Age: 45
End: 2020-01-08

## 2020-01-13 DIAGNOSIS — R13.10 ODYNOPHAGIA: ICD-10-CM

## 2020-01-13 DIAGNOSIS — F41.9 ANXIETY: ICD-10-CM

## 2020-01-13 RX ORDER — LORAZEPAM 1 MG/1
1 TABLET ORAL EVERY 8 HOURS PRN
Qty: 21 TABLET | Refills: 0 | Status: SHIPPED | OUTPATIENT
Start: 2020-01-13 | End: 2020-11-20 | Stop reason: SDUPTHER

## 2020-01-13 RX ORDER — MELATONIN
1000 DAILY
Qty: 30 TABLET | Refills: 5 | Status: SHIPPED | OUTPATIENT
Start: 2020-01-13 | End: 2020-01-23

## 2020-01-13 RX ORDER — PREDNISONE 10 MG/1
20 TABLET ORAL 2 TIMES DAILY WITH MEALS
Qty: 100 TABLET | Refills: 5 | Status: SHIPPED | OUTPATIENT
Start: 2020-01-13 | End: 2020-10-28 | Stop reason: HOSPADM

## 2020-01-21 ENCOUNTER — TELEPHONE (OUTPATIENT)
Dept: GASTROENTEROLOGY | Facility: CLINIC | Age: 45
End: 2020-01-21

## 2020-01-21 DIAGNOSIS — K50.119 CROHN'S DISEASE OF COLON WITH COMPLICATION (HCC): Primary | ICD-10-CM

## 2020-01-21 NOTE — TELEPHONE ENCOUNTER
Dr Harsha Monroe pt hx crohns    Patient calls with liquid diarrhea alternating with formed stool since Christmas, has been experiencing only liquid BMs the last 2 weeks  Occasional blood/mucus in stool  Crampy pain in LLQ & RUQ after eating  Having liquid BMs immediately after eating for the past 3 days  Trying to stay hydrated  She is nauseous  Denies fever/chills, vomiting  Taking probiotic 2 pills twice daily  F/U visit with Dr Harsha Monroe Monday 1/27  Patient only wants to see Dr Harsha Monroe as he knows her hx of mast cell activation disorder & crohns  Patient does not want to end up in the [facility]- are there any earlier appointments with Dr Harsha Monroe for this week? PA pool- Any recommendations until patient is seen in the office?

## 2020-01-21 NOTE — TELEPHONE ENCOUNTER
Patient called in to state that she has been experiencing nausea, and diarrhea immediately after eating for the past week  She is unable to get through any of her meals  She would like a call to discuss her symptoms  [phone]  FYI: Patient is scheduled for a follow-up with Dr Peter Daniel on 1/27 in the [facility] office

## 2020-01-21 NOTE — TELEPHONE ENCOUNTER
Left detailed message letting patient know to complete blood and stool tests prior to her appointment on Thursday  Thank you Munir Araujo and MARIBETH SOUZA [place]!

## 2020-01-22 ENCOUNTER — APPOINTMENT (OUTPATIENT)
Dept: LAB | Facility: HOSPITAL | Age: 45
End: 2020-01-22
Attending: INTERNAL MEDICINE
Payer: COMMERCIAL

## 2020-01-22 ENCOUNTER — TRANSCRIBE ORDERS (OUTPATIENT)
Dept: ADMINISTRATIVE | Facility: HOSPITAL | Age: 45
End: 2020-01-22

## 2020-01-22 DIAGNOSIS — K52.9 IBD (INFLAMMATORY BOWEL DISEASE): ICD-10-CM

## 2020-01-22 DIAGNOSIS — D89.40 MAST CELL ACTIVATION (HCC): ICD-10-CM

## 2020-01-22 DIAGNOSIS — K50.119 CROHN'S DISEASE OF COLON WITH COMPLICATION (HCC): ICD-10-CM

## 2020-01-22 DIAGNOSIS — D89.40 MAST CELL ACTIVATION (HCC): Primary | ICD-10-CM

## 2020-01-22 LAB
ALBUMIN SERPL BCP-MCNC: 3.7 G/DL (ref 3.5–5)
ALP SERPL-CCNC: 59 U/L (ref 46–116)
ALT SERPL W P-5'-P-CCNC: 26 U/L (ref 12–78)
ANION GAP SERPL CALCULATED.3IONS-SCNC: 9 MMOL/L (ref 4–13)
AST SERPL W P-5'-P-CCNC: 22 U/L (ref 5–45)
BASOPHILS # BLD AUTO: 0.11 THOUSANDS/ΜL (ref 0–0.1)
BASOPHILS NFR BLD AUTO: 1 % (ref 0–1)
BILIRUB SERPL-MCNC: 0.2 MG/DL (ref 0.2–1)
BUN SERPL-MCNC: 8 MG/DL (ref 5–25)
CALCIUM SERPL-MCNC: 8.9 MG/DL (ref 8.3–10.1)
CHLORIDE SERPL-SCNC: 104 MMOL/L (ref 100–108)
CO2 SERPL-SCNC: 26 MMOL/L (ref 21–32)
CREAT SERPL-MCNC: 0.66 MG/DL (ref 0.6–1.3)
EOSINOPHIL # BLD AUTO: 0.22 THOUSAND/ΜL (ref 0–0.61)
EOSINOPHIL NFR BLD AUTO: 3 % (ref 0–6)
ERYTHROCYTE [DISTWIDTH] IN BLOOD BY AUTOMATED COUNT: 12.8 % (ref 11.6–15.1)
GFR SERPL CREATININE-BSD FRML MDRD: 106 ML/MIN/1.73SQ M
GLUCOSE SERPL-MCNC: 101 MG/DL (ref 65–140)
HCT VFR BLD AUTO: 40.8 % (ref 34.8–46.1)
HGB BLD-MCNC: 13.3 G/DL (ref 11.5–15.4)
IMM GRANULOCYTES # BLD AUTO: 0.02 THOUSAND/UL (ref 0–0.2)
IMM GRANULOCYTES NFR BLD AUTO: 0 % (ref 0–2)
INSULIN SERPL-ACNC: 17.8 MU/L (ref 3–25)
LYMPHOCYTES # BLD AUTO: 1.73 THOUSANDS/ΜL (ref 0.6–4.47)
LYMPHOCYTES NFR BLD AUTO: 21 % (ref 14–44)
MCH RBC QN AUTO: 30.7 PG (ref 26.8–34.3)
MCHC RBC AUTO-ENTMCNC: 32.6 G/DL (ref 31.4–37.4)
MCV RBC AUTO: 94 FL (ref 82–98)
MONOCYTES # BLD AUTO: 0.79 THOUSAND/ΜL (ref 0.17–1.22)
MONOCYTES NFR BLD AUTO: 10 % (ref 4–12)
NEUTROPHILS # BLD AUTO: 5.2 THOUSANDS/ΜL (ref 1.85–7.62)
NEUTS SEG NFR BLD AUTO: 65 % (ref 43–75)
NRBC BLD AUTO-RTO: 0 /100 WBCS
PLATELET # BLD AUTO: 396 THOUSANDS/UL (ref 149–390)
PMV BLD AUTO: 8.7 FL (ref 8.9–12.7)
POTASSIUM SERPL-SCNC: 3.8 MMOL/L (ref 3.5–5.3)
PROT SERPL-MCNC: 7.7 G/DL (ref 6.4–8.2)
RBC # BLD AUTO: 4.33 MILLION/UL (ref 3.81–5.12)
SODIUM SERPL-SCNC: 139 MMOL/L (ref 136–145)
WBC # BLD AUTO: 8.07 THOUSAND/UL (ref 4.31–10.16)

## 2020-01-22 PROCEDURE — 83520 IMMUNOASSAY QUANT NOS NONAB: CPT

## 2020-01-22 PROCEDURE — 83993 ASSAY FOR CALPROTECTIN FECAL: CPT

## 2020-01-22 PROCEDURE — 36415 COLL VENOUS BLD VENIPUNCTURE: CPT

## 2020-01-22 PROCEDURE — 83525 ASSAY OF INSULIN: CPT

## 2020-01-22 PROCEDURE — 80053 COMPREHEN METABOLIC PANEL: CPT

## 2020-01-22 PROCEDURE — 85025 COMPLETE CBC W/AUTO DIFF WBC: CPT

## 2020-01-22 PROCEDURE — 87493 C DIFF AMPLIFIED PROBE: CPT

## 2020-01-23 ENCOUNTER — OFFICE VISIT (OUTPATIENT)
Dept: GASTROENTEROLOGY | Facility: MEDICAL CENTER | Age: 45
End: 2020-01-23
Payer: COMMERCIAL

## 2020-01-23 VITALS
HEIGHT: 65 IN | WEIGHT: 146.6 LBS | HEART RATE: 89 BPM | BODY MASS INDEX: 24.43 KG/M2 | SYSTOLIC BLOOD PRESSURE: 108 MMHG | TEMPERATURE: 97.8 F | DIASTOLIC BLOOD PRESSURE: 64 MMHG

## 2020-01-23 DIAGNOSIS — K52.9 IBD (INFLAMMATORY BOWEL DISEASE): Primary | ICD-10-CM

## 2020-01-23 LAB — C DIFF TOX GENS STL QL NAA+PROBE: NEGATIVE

## 2020-01-23 PROCEDURE — 99215 OFFICE O/P EST HI 40 MIN: CPT | Performed by: INTERNAL MEDICINE

## 2020-01-23 RX ORDER — PREDNISONE 1 MG/1
TABLET ORAL
Qty: 250 TABLET | Refills: 0 | Status: SHIPPED | OUTPATIENT
Start: 2020-01-23 | End: 2020-07-09 | Stop reason: SDUPTHER

## 2020-01-23 NOTE — PROGRESS NOTES
[facility] - Outpatient Follow-up Note  Sravani Judd 53 y o  female MRN: [id number]  Encounter: [id number]          ASSESSMENT AND PLAN:    Sravani Judd is a 53 y o  female who presents with complex medical history, including long-standing IBD/UC (for > 20 years), MCAS and possible immunodeficiency (for which she receives IVIG), multiple episodes of sepsis, cholecystectomy (2009), hypothyroidism who presents for UC  Since last visit she had a set back with a return of her symptoms, including incontinence and diarrhea  C diff negative  Suspect IBD flare, possibly with some component of IBS given the clear link to anxiety  Available labs and imaging reviewed  1  IBD (inflammatory bowel disease)      Orders Placed This Encounter   Procedures    ADALIMUMAB CONCENTRATION AND ANTI-ADALIMUMAB AB     -- We discussed medical options, and reviewed the recommendations from [facility]  She does not wish to restart Remicade, but given her improvement on the anti-TNF, she wishes to try Humira at this time  We discussed potential side effects, risks, benefits, alternatives  Will start the process of obtaining a prior approval    -- Quick pred taper given her symptoms and the effects on her life and daily functioning  -- Repeat blood work already ordered  -- Obtain Humira level prior to the fourth dose  -- Follow-up with Dr Sury Moore  ______________________________________________________________________    SUBJECTIVE:    Sravani Judd is a 53 y o  female who presents with complex medical history, including long-standing IBD/UC (for > 20 years), MCAS and possible immunodeficiency (for which she receives IVIG), multiple episodes of sepsis, cholecystectomy (2009), hypothyroidism who presents for UC  Since last visit she has not been doing well, starting in mid-December  She does not feel that she is back to her normal  She has been in bed a lot for the past 4 weeks  Her sleep is bad   She has been fatigued and also feels pain everywhere  She attributes some of this to her fibromyalgia  She has abdominal aches (lower abdominal pain in the middle - suprapubic), diarrhea, incontinence  5-8 BMs per day on average, all loose/watery (none formed)  Some blood  + stringiness  Her tryptase has been elevated  Weight is up  C diff checked yesterday was negative  She has been on the ketofin  She is doing weekly IVIG  REVIEW OF SYSTEMS IS OTHERWISE NEGATIVE  10 point ROS reviewed and negative, except as above      Historical Information   Past Medical History:   Diagnosis Date    Anxiety     Asthma     Chronic kidney disease     Deep vein thrombosis (HCC)     Disease of thyroid gland     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    Generalized anxiety disorder 8/26/2017    Heart murmur     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HCC)     Migraine     Myocardial infarction ([address] )     NSTEMI  pt denies, documented in cardio note    Pancreatitis     sphinger of     Psychiatric disorder     RA (rheumatoid arthritis) ([address] )     Ulcerative colitis ([address] )      Past Surgical History:   Procedure Laterality Date    APPENDECTOMY      CHOLECYSTECTOMY      EGD AND COLONOSCOPY N/A 9/12/2017    Procedure: EGD AND COLONOSCOPY;  Surgeon: Gerald Crook MD;  [facility]: [facility]; Service: Gastroenterology    ERCP N/A 9/15/2017    Procedure: ENDOSCOPIC RETROGRADE CHOLANGIOPANCREATOGRAPHY (ERCP); Surgeon: Sergei Young MD;  [facility];   Service: Gastroenterology    HYSTERECTOMY      KNEE SURGERY Right     LAPAROSCOPIC ENDOMETRIOSIS FULGURATION      ORIF ANKLE FRACTURE Left     CA KNEE SCOPE,MED/LAT MENISECTOMY Left 5/12/2017    Procedure: POSSIBLE MEDIAL MENISECTOMY;  Surgeon: Roxanne Ruiz MD;  [facility];  Service: Orthopedics    CA KNEE [address] CART Left 5/12/2017    Procedure: KNEE ARTHROSCOPY EVALUATION, CHONDROPLASTY;  Surgeon: Roxanne Ruiz MD;  [facility];  Service: Orthopedics    TX LAP,DIAGNOSTIC ABDOMEN N/A 12/12/2017    Procedure: LAPAROSCOPY DIAGNOSTIC  WITH LYSIS OF ADHESIONS;  Surgeon: Ellie Herr DO;  [facility];  Service: General     Social History   Social History     Substance and Sexual Activity   Alcohol Use Never    Frequency: Never    Binge frequency: Never     Social History     Substance and Sexual Activity   Drug Use Not Currently     Social History     Tobacco Use   Smoking Status Never Smoker   Smokeless Tobacco Never Used     Family History   Problem Relation Age of Onset    Ulcerative colitis Mother     Heart failure Father     Neuropathy Father     Macular degeneration Father     Diabetes Father     Asthma Daughter     Hypothyroidism Daughter     Heart disease Maternal Grandmother     Arthritis Maternal Grandmother     Arthritis Paternal Grandmother     Macular degeneration Paternal Grandmother     Lymphoma Paternal Grandfather     Heart failure Paternal Aunt     Heart failure Paternal Aunt     Breast cancer Paternal Aunt 47    Breast cancer additional onset Paternal Aunt 62    Hypothyroidism Paternal Aunt     Breast cancer Maternal Aunt     No Known Problems Maternal Grandfather        Meds/Allergies       Current Outpatient Medications:     aspirin 81 mg chewable tablet    butalbital-acetaminophen-caffeine (FIORICET,ESGIC) -40 mg per tablet    cetirizine (ZyrTEC) 10 mg tablet    cholecalciferol (VITAMIN D) 400 units/mL    diphenhydrAMINE (BENADRYL) 25 mg tablet    EPINEPHrine (EPIPEN 2-MARYSOL IJ)    Ketotifen Fumarate POWD    levothyroxine (LEVOXYL) 50 mcg tablet    LORazepam (ATIVAN) 1 mg tablet    montelukast (SINGULAIR) 10 mg tablet    omalizumab (XOLAIR) 150 mg    ondansetron (ZOFRAN) 4 mg tablet    pantoprazole (PROTONIX) 40 mg tablet    Probiotic Product (PROBIOTIC DAILY PO)    temazepam (RESTORIL) 30 mg capsule    Fexofenadine HCl (MUCINEX ALLERGY PO)    furosemide (LASIX) 20 mg tablet   hydrOXYzine HCL (ATARAX) 25 mg tablet    inFLIXimab (REMICADE IV)    polyethylene glycol (MIRALAX) 17 g packet    predniSONE 10 mg tablet    ranitidine (ZANTAC) 150 MG capsule    traMADol (ULTRAM) 50 mg tablet    Current Facility-Administered Medications:     cyanocobalamin injection 1,000 mcg, 1,000 mcg, Intramuscular, Q30 Days, 1,000 mcg at 10/25/19 1204    Allergies   Allergen Reactions    Amitriptyline Anaphylaxis     According to the patient she had nphylaxis    Aspergillus Fumigatus Other (See Comments), Hives and Swelling    Azathioprine Other (See Comments) and Anaphylaxis     pancreatitis  According to patient she needed Epipen    Eggs Or Egg-Derived Products Anaphylaxis    Sumatriptan Anaphylaxis     Bradycardia, sob, back pressure, head pain,throat tightness  According to the patient she had anphylaxis    Contrast [Iodinated Diagnostic Agents]      Pt states needs to be premedicated prior to injection    Corn Dextrin      Any corn based products    Corn Oil Hives    Gelatin     Histamine      Intolerance      Ibuprofen Hives and Throat Swelling    Malto Dextrin [Dextrin]     Other      Gel caps, maltodextrose, dextrose,grapes    Penicillium Notatum     Sulfa Antibiotics Hives and Other (See Comments)    Sulfate     Sulfites            Objective     Blood pressure 108/64, pulse 89, temperature 97 8 °F (36 6 °C), height 5' 5" (1 651 m), weight 66 5 kg (146 lb 9 6 oz), not currently breastfeeding  Body mass index is 24 4 kg/m²  PHYSICAL EXAM:      General Appearance:   Alert, cooperative, no distress   HEENT:   Normocephalic, atraumatic, anicteric  Neck:  Supple, symmetrical, trachea midline   Lungs:   Clear to auscultation bilaterally; no rales, rhonchi or wheezing; respirations unlabored    Heart[de-identified]   Regular rate and rhythm; no murmur, rub, or gallop     Abdomen:   Soft, non-tender, non-distended; normal bowel sounds; no masses, no organomegaly    Genitalia:   Deferred Rectal:   Deferred    Extremities:  No cyanosis, clubbing or edema    Pulses:  2+ and symmetric    Skin:  No jaundice, rashes, or lesions    Lymph nodes:  No palpable cervical lymphadenopathy        Lab Results:   No visits with results within 1 Day(s) from this visit  Latest known visit with results is:   Appointment on 01/22/2020   Component Date Value    WBC 01/22/2020 8 07     RBC 01/22/2020 4 33     Hemoglobin 01/22/2020 13 3     Hematocrit 01/22/2020 40 8     MCV 01/22/2020 94     MCH 01/22/2020 30 7     MCHC 01/22/2020 32 6     RDW 01/22/2020 12 8     MPV 01/22/2020 8 7*    Platelets 91/71/5742 396*    nRBC 01/22/2020 0     Neutrophils Relative 01/22/2020 65     Immat GRANS % 01/22/2020 0     Lymphocytes Relative 01/22/2020 21     Monocytes Relative 01/22/2020 10     Eosinophils Relative 01/22/2020 3     Basophils Relative 01/22/2020 1     Neutrophils Absolute 01/22/2020 5 20     Immature Grans Absolute 01/22/2020 0 02     Lymphocytes Absolute 01/22/2020 1 73     Monocytes Absolute 01/22/2020 0 79     Eosinophils Absolute 01/22/2020 0 22     Basophils Absolute 01/22/2020 0 11*    Sodium 01/22/2020 139     Potassium 01/22/2020 3 8     Chloride 01/22/2020 104     CO2 01/22/2020 26     ANION GAP 01/22/2020 9     BUN 01/22/2020 8     Creatinine 01/22/2020 0 66     Glucose 01/22/2020 101     Calcium 01/22/2020 8 9     AST 01/22/2020 22     ALT 01/22/2020 26     Alkaline Phosphatase 01/22/2020 59     Total Protein 01/22/2020 7 7     Albumin 01/22/2020 3 7     Total Bilirubin 01/22/2020 0 20     eGFR 01/22/2020 106          Radiology Results:   No results found

## 2020-01-24 ENCOUNTER — TELEPHONE (OUTPATIENT)
Dept: GASTROENTEROLOGY | Facility: CLINIC | Age: 45
End: 2020-01-24

## 2020-01-24 LAB — CALPROTECTIN STL-MCNT: 1551 UG/G (ref 0–120)

## 2020-01-24 NOTE — TELEPHONE ENCOUNTER
Dr Hilda Guzman pt hx crohns    Patient calls with fever of 102, migraine  Patient requesting fioricet  I explained we are not able to prescribe this medication for her, will need to contact her PCP  She states her PCP is on vacation  She states she only gets migraines when her GI symptoms are acting up  Just saw Dr Hilda Guzman in the office yesterday & started on prednisone taper  Anything we can prescribe for her to help with this?

## 2020-01-24 NOTE — TELEPHONE ENCOUNTER
I would suggest she go to urgent care or the ER, especially with the fever  Migraines are out of our scope of practice unfortunately  I do want to help but we are a specialty practice  I do urge her to get seen by someone today though the fever is concerning

## 2020-01-24 NOTE — TELEPHONE ENCOUNTER
Patients GI provider:  Dr Mara Arevalo     Number to return call: [phone]    Reason for call: Pt calling stating she has a low grade fever and a migraine  Pt asks if a script for Fioricet can be sent to the pharmacy for or her migraines?     Scheduled procedure/appointment date if applicable: NA

## 2020-01-25 LAB — TRYPTASE SERPL-MCNC: 11.7 UG/L (ref 2.2–13.2)

## 2020-01-27 ENCOUNTER — TELEPHONE (OUTPATIENT)
Dept: GASTROENTEROLOGY | Facility: MEDICAL CENTER | Age: 45
End: 2020-01-27

## 2020-01-27 NOTE — TELEPHONE ENCOUNTER
Patients GI provider:  Dr John Altamirano    Number to return call: [phone]    Reason for call: Pt calling requesting to speak with Dr Aleena Brownlee nurse regarding GI imflammation and predsnisone that was prescribed with no improvement and have been passing liquid       Scheduled procedure/appointment date if applicable: Apt/procedure 3/11/20

## 2020-01-27 NOTE — TELEPHONE ENCOUNTER
I called her; see previous notes regarding her symptoms  She is having fevers which is concerning, she has required admission for sepsis several times in the past year  Discussed her case with Dr Hilda Guzman, he recommends that she go to the ER  She refused to go to the ER, she is sobbing on the phone  We discussed that she could become very sick and is dangerous for her to stay home  I again encouraged her to go to the ER, she states she will only go if she can be directly admitted  She then hung up on me  I reached out to the admitting internal medicine physician at [facility] and discussed her case with him (Dr Mono Hernandez) and he agreed that she should go to the ER to be properly evaluated and triaged  I called her back and explained this; she refused to go

## 2020-01-27 NOTE — TELEPHONE ENCOUNTER
I wrote a telephone note; she refused to go to the ER  I did reach out to [facility] to discuss a direct admit but they recommended she go to the ER too so I explained that but she is still refusing to go today  I will talk to our team at [facility] tomorrow and see if they would be able to accommodate her

## 2020-01-27 NOTE — TELEPHONE ENCOUNTER
Very complex Dr Peter Daniel pt hx IBD/ UC    Patient has not passed a formed BM in weeks, having liquid stools that are "pouring out uncontrollably"  Abdomen is hard and bloated, very painful  Started on prednisone on 1/23 d/t fecal calprotectin 1551, states prednisone has not helped, just making her hungry & is getting more bloated  Last fever 1/24 was 102  Patient crying on the phone states she had 3 episodes of sepsis last year & does not want this to happen again  I advised patient go to ED d/t above symptoms  Patient does not want to go, they will just "tell her she has UC & have her f/u with GI outpatient"  Please advise

## 2020-01-28 NOTE — TELEPHONE ENCOUNTER
I spoke with Yolanda Pozo as well as the [address] [place] [address] admitting physician today  The admitting physician will not directly admit her; given her past medical history he does not feel comfortable  He recommends she go to the ER to be properly triaged to determine the level of care she would need in case it exceeds the capabilities of the [address] [place] [address]  I called her to let her know as well as to check on her; there was no answer so I left a message

## 2020-01-30 NOTE — TELEPHONE ENCOUNTER
Dr Jenn Wayne pt hx IBD/UC    Patient calls again today (please see previous notes) with constant abdominal pain LLQ & RUQ "feels like my intestines are tightening", bloating, last BM Monday  Has tried bentyl previously with no relief  Patient is crying on the phone that she does not want to become septic again  I advised she monitor her temp (last one was Friday), use miralax to initiate BM  Patient is currently on prednisone taper and we are in the process of getting humira approved  Dr Cherelle Norman and Eagle Storey have talked to patient, she wants to be directly admitted so she can get her infusion sooner  Both advised patient to go to ED d/t her symptoms, cannot be directly admitted d/t having to evaluate her in ED first (might need ICU), patient refuses to go to ED because she is immunocompromised  Patient wants me to ask again about being directly admitted or what else she can do in the meantime before starting humira  Also requesting miralax refill

## 2020-01-30 NOTE — TELEPHONE ENCOUNTER
We have told her previously that we attempted to direct admit via [facility] but unable to do so  I called & explained this her  She was adamant about not going to the ER  I explained that I was concerned that she could be obstructed given now no BM's in 3 days, that she may need imaging that could be delayed if not done immediately  She refusing to go  Tearful  Stating she just wants to start the Humira as no improvement on prednisone  She would not listen to any further recommendations & ask we call her when we have the approval     Dr Cory Dempsey would you like an outpt CT  No abdominal imaging since Sept ? Or any other recommendations  Maybe if you speak to her??    Please check on auth for Alfred Huang Presume

## 2020-01-31 ENCOUNTER — DOCUMENTATION (OUTPATIENT)
Dept: GASTROENTEROLOGY | Facility: MEDICAL CENTER | Age: 45
End: 2020-01-31

## 2020-02-13 DIAGNOSIS — Z91.09 ENVIRONMENTAL ALLERGIES: ICD-10-CM

## 2020-02-14 ENCOUNTER — TRANSCRIBE ORDERS (OUTPATIENT)
Dept: ADMINISTRATIVE | Facility: HOSPITAL | Age: 45
End: 2020-02-14

## 2020-02-14 ENCOUNTER — APPOINTMENT (OUTPATIENT)
Dept: LAB | Facility: HOSPITAL | Age: 45
End: 2020-02-14
Payer: COMMERCIAL

## 2020-02-14 DIAGNOSIS — E03.2 HYPOTHYROIDISM DUE TO MEDICAMENTS AND OTHER EXOGENOUS SUBSTANCES: ICD-10-CM

## 2020-02-14 DIAGNOSIS — E05.90 THYROTOXICOSIS WITHOUT THYROID STORM, UNSPECIFIED THYROTOXICOSIS TYPE: ICD-10-CM

## 2020-02-14 DIAGNOSIS — E03.2 HYPOTHYROIDISM DUE TO MEDICAMENTS AND OTHER EXOGENOUS SUBSTANCES: Primary | ICD-10-CM

## 2020-02-14 LAB
ERYTHROCYTE [SEDIMENTATION RATE] IN BLOOD: 4 MM/HOUR (ref 0–20)
T3FREE SERPL-MCNC: 2.78 PG/ML (ref 2.3–4.2)
T4 FREE SERPL-MCNC: 1.06 NG/DL (ref 0.76–1.46)
TSH SERPL DL<=0.05 MIU/L-ACNC: 0.23 UIU/ML (ref 0.36–3.74)

## 2020-02-14 PROCEDURE — 84439 ASSAY OF FREE THYROXINE: CPT

## 2020-02-14 PROCEDURE — 85652 RBC SED RATE AUTOMATED: CPT

## 2020-02-14 PROCEDURE — 84481 FREE ASSAY (FT-3): CPT

## 2020-02-14 PROCEDURE — 86800 THYROGLOBULIN ANTIBODY: CPT

## 2020-02-14 PROCEDURE — 84443 ASSAY THYROID STIM HORMONE: CPT

## 2020-02-14 PROCEDURE — 36415 COLL VENOUS BLD VENIPUNCTURE: CPT

## 2020-02-18 LAB — THYROGLOB AB SERPL-ACNC: 2.1 IU/ML (ref 0–0.9)

## 2020-03-10 ENCOUNTER — TELEPHONE (OUTPATIENT)
Dept: OTHER | Facility: HOSPITAL | Age: 45
End: 2020-03-10

## 2020-03-23 RX ORDER — MONTELUKAST SODIUM 10 MG/1
TABLET ORAL
Qty: 60 TABLET | Refills: 0 | OUTPATIENT
Start: 2020-03-23

## 2020-03-25 ENCOUNTER — TELEPHONE (OUTPATIENT)
Dept: GASTROENTEROLOGY | Facility: AMBULARY SURGERY CENTER | Age: 45
End: 2020-03-25

## 2020-03-25 DIAGNOSIS — K52.9 IBD (INFLAMMATORY BOWEL DISEASE): Primary | ICD-10-CM

## 2020-03-25 NOTE — TELEPHONE ENCOUNTER
Patients GI provider:  Dr Shawn Harden    Number to return call: (  [phone]    Reason for call: Pt  Is having abd pain and constipation , her humira dose is lowered---please assist  Scheduled procedure/appointment date if applicable: Apt/procedure  na

## 2020-03-25 NOTE — TELEPHONE ENCOUNTER
Patient with history of ulcerative colitis, sepsis  She is calling to report that she is not doing well after starting the Humira  1/23 Saturday - start Humira 160mg - she felt great within an hour  2/22 - 80mg - BMs not so good, no pain  3/7 - 40 mg - fine for the first 5 days then it was "downhill " Severe pain, in and out of bathroom, joint pain, by Wednesday she wanted to give herself another shot  3/21 - Downhill since Monday  In bathroom 10 times, watery stools, severe pain, No blood at any times    She is feeling terrible right now      Please advise

## 2020-03-25 NOTE — TELEPHONE ENCOUNTER
I entered c diff & stool culture to r/o infection as well as calprotectin & CRP  Humira drug level & antibodies already entered     Zoe Brice

## 2020-04-01 ENCOUNTER — TELEMEDICINE (OUTPATIENT)
Dept: FAMILY MEDICINE CLINIC | Facility: HOME HEALTHCARE | Age: 45
End: 2020-04-01
Payer: COMMERCIAL

## 2020-04-01 DIAGNOSIS — J06.9 UPPER RESPIRATORY TRACT INFECTION, UNSPECIFIED TYPE: Primary | ICD-10-CM

## 2020-04-01 DIAGNOSIS — J01.90 ACUTE SINUSITIS, RECURRENCE NOT SPECIFIED, UNSPECIFIED LOCATION: ICD-10-CM

## 2020-04-01 DIAGNOSIS — Z20.828 EXPOSURE TO SARS-ASSOCIATED CORONAVIRUS: ICD-10-CM

## 2020-04-01 PROCEDURE — 87635 SARS-COV-2 COVID-19 AMP PRB: CPT

## 2020-04-01 PROCEDURE — G0071 COMM SVCS BY RHC/FQHC 5 MIN: HCPCS | Performed by: FAMILY MEDICINE

## 2020-04-03 DIAGNOSIS — F51.04 PSYCHOPHYSIOLOGICAL INSOMNIA: ICD-10-CM

## 2020-04-05 LAB — SARS-COV-2 RNA SPEC QL NAA+PROBE: NOT DETECTED

## 2020-04-06 ENCOUNTER — TELEPHONE (OUTPATIENT)
Dept: FAMILY MEDICINE CLINIC | Facility: CLINIC | Age: 45
End: 2020-04-06

## 2020-04-06 ENCOUNTER — APPOINTMENT (OUTPATIENT)
Dept: LAB | Facility: HOSPITAL | Age: 45
End: 2020-04-06
Payer: COMMERCIAL

## 2020-04-06 ENCOUNTER — TELEPHONE (OUTPATIENT)
Dept: GASTROENTEROLOGY | Facility: AMBULARY SURGERY CENTER | Age: 45
End: 2020-04-06

## 2020-04-06 ENCOUNTER — TELEMEDICINE (OUTPATIENT)
Dept: GASTROENTEROLOGY | Facility: MEDICAL CENTER | Age: 45
End: 2020-04-06
Payer: COMMERCIAL

## 2020-04-06 DIAGNOSIS — K52.9 IBD (INFLAMMATORY BOWEL DISEASE): ICD-10-CM

## 2020-04-06 DIAGNOSIS — D84.9 IMMUNOSUPPRESSED STATUS (HCC): ICD-10-CM

## 2020-04-06 DIAGNOSIS — E44.1 MILD PROTEIN-CALORIE MALNUTRITION (HCC): ICD-10-CM

## 2020-04-06 DIAGNOSIS — K50.819 CROHN'S DISEASE OF SMALL AND LARGE INTESTINES WITH COMPLICATION (HCC): ICD-10-CM

## 2020-04-06 DIAGNOSIS — J01.00 ACUTE NON-RECURRENT MAXILLARY SINUSITIS: Primary | ICD-10-CM

## 2020-04-06 DIAGNOSIS — K52.3 INDETERMINATE COLITIS: Primary | ICD-10-CM

## 2020-04-06 DIAGNOSIS — D89.40 MAST CELL ACTIVATION SYNDROME (HCC): ICD-10-CM

## 2020-04-06 LAB — CRP SERPL QL: <0.5 MG/L

## 2020-04-06 PROCEDURE — 99214 OFFICE O/P EST MOD 30 MIN: CPT | Performed by: INTERNAL MEDICINE

## 2020-04-06 PROCEDURE — 80145 DRUG ASSAY ADALIMUMAB: CPT

## 2020-04-06 PROCEDURE — 86140 C-REACTIVE PROTEIN: CPT

## 2020-04-06 PROCEDURE — 82397 CHEMILUMINESCENT ASSAY: CPT

## 2020-04-06 PROCEDURE — 87505 NFCT AGENT DETECTION GI: CPT

## 2020-04-06 PROCEDURE — 36415 COLL VENOUS BLD VENIPUNCTURE: CPT

## 2020-04-06 PROCEDURE — 83993 ASSAY FOR CALPROTECTIN FECAL: CPT

## 2020-04-06 RX ORDER — AZITHROMYCIN 250 MG/1
TABLET, FILM COATED ORAL
Qty: 6 TABLET | Refills: 0 | Status: SHIPPED | OUTPATIENT
Start: 2020-04-06 | End: 2020-04-10

## 2020-04-06 RX ORDER — TEMAZEPAM 30 MG/1
30 CAPSULE ORAL
Qty: 30 CAPSULE | Refills: 3 | Status: SHIPPED | OUTPATIENT
Start: 2020-04-06 | End: 2020-08-07 | Stop reason: SDUPTHER

## 2020-04-07 LAB
C DIFF TOX GENS STL QL NAA+PROBE: NEGATIVE
CAMPYLOBACTER DNA SPEC NAA+PROBE: NORMAL
SALMONELLA DNA SPEC QL NAA+PROBE: NORMAL
SHIGA TOXIN STX GENE SPEC NAA+PROBE: NORMAL
SHIGELLA DNA SPEC QL NAA+PROBE: NORMAL

## 2020-04-08 ENCOUNTER — DOCUMENTATION (OUTPATIENT)
Dept: GASTROENTEROLOGY | Facility: MEDICAL CENTER | Age: 45
End: 2020-04-08

## 2020-04-08 ENCOUNTER — TELEPHONE (OUTPATIENT)
Dept: GASTROENTEROLOGY | Facility: MEDICAL CENTER | Age: 45
End: 2020-04-08

## 2020-04-09 LAB — CALPROTECTIN STL-MCNT: 18 UG/G (ref 0–120)

## 2020-04-11 LAB
ADALIMUMAB AB SERPL-MCNC: <25 NG/ML
ADALIMUMAB SERPL-MCNC: 6.7 UG/ML

## 2020-04-14 ENCOUNTER — TELEPHONE (OUTPATIENT)
Dept: GASTROENTEROLOGY | Facility: MEDICAL CENTER | Age: 45
End: 2020-04-14

## 2020-04-15 ENCOUNTER — NURSE TRIAGE (OUTPATIENT)
Dept: OTHER | Facility: OTHER | Age: 45
End: 2020-04-15

## 2020-04-16 ENCOUNTER — TELEPHONE (OUTPATIENT)
Dept: OTHER | Facility: OTHER | Age: 45
End: 2020-04-16

## 2020-04-20 DIAGNOSIS — M25.50 ARTHRALGIA, UNSPECIFIED JOINT: ICD-10-CM

## 2020-04-20 DIAGNOSIS — K90.9 STEATORRHEA: Primary | ICD-10-CM

## 2020-06-08 ENCOUNTER — TELEMEDICINE (OUTPATIENT)
Dept: GASTROENTEROLOGY | Facility: MEDICAL CENTER | Age: 45
End: 2020-06-08
Payer: COMMERCIAL

## 2020-06-08 DIAGNOSIS — R79.89 LOW VITAMIN D LEVEL: ICD-10-CM

## 2020-06-08 DIAGNOSIS — E44.1 MILD PROTEIN-CALORIE MALNUTRITION (HCC): ICD-10-CM

## 2020-06-08 DIAGNOSIS — K52.3 INDETERMINATE COLITIS: ICD-10-CM

## 2020-06-08 DIAGNOSIS — K52.9 INFLAMMATORY BOWEL DISEASE: ICD-10-CM

## 2020-06-08 DIAGNOSIS — K51.911 EXACERBATION OF ULCERATIVE COLITIS WITH RECTAL BLEEDING (HCC): ICD-10-CM

## 2020-06-08 DIAGNOSIS — K50.119 CROHN'S DISEASE OF COLON WITH COMPLICATION (HCC): Primary | ICD-10-CM

## 2020-06-08 DIAGNOSIS — D89.40 MAST CELL ACTIVATION SYNDROME (HCC): ICD-10-CM

## 2020-06-08 DIAGNOSIS — D84.9 IMMUNOSUPPRESSED STATUS (HCC): ICD-10-CM

## 2020-06-08 PROCEDURE — 99214 OFFICE O/P EST MOD 30 MIN: CPT | Performed by: INTERNAL MEDICINE

## 2020-06-12 ENCOUNTER — TRANSCRIBE ORDERS (OUTPATIENT)
Dept: ADMINISTRATIVE | Facility: HOSPITAL | Age: 45
End: 2020-06-12

## 2020-06-12 ENCOUNTER — APPOINTMENT (OUTPATIENT)
Dept: LAB | Facility: HOSPITAL | Age: 45
End: 2020-06-12
Attending: INTERNAL MEDICINE
Payer: COMMERCIAL

## 2020-06-12 DIAGNOSIS — E03.2 IATROGENIC HYPOTHYROIDISM: Primary | ICD-10-CM

## 2020-06-12 DIAGNOSIS — E03.2 IATROGENIC HYPOTHYROIDISM: ICD-10-CM

## 2020-06-12 DIAGNOSIS — K50.119 CROHN'S DISEASE OF COLON WITH COMPLICATION (HCC): ICD-10-CM

## 2020-06-12 DIAGNOSIS — M25.50 ARTHRALGIA, UNSPECIFIED JOINT: ICD-10-CM

## 2020-06-12 DIAGNOSIS — T78.2XXS ANAPHYLAXIS, SEQUELA: Primary | ICD-10-CM

## 2020-06-12 DIAGNOSIS — D89.40 MAST CELL ACTIVATION (HCC): ICD-10-CM

## 2020-06-12 DIAGNOSIS — T78.2XXS ANAPHYLAXIS, SEQUELA: ICD-10-CM

## 2020-06-12 DIAGNOSIS — R79.89 LOW VITAMIN D LEVEL: ICD-10-CM

## 2020-06-12 LAB
25(OH)D3 SERPL-MCNC: 22.1 NG/ML (ref 30–100)
ALBUMIN SERPL BCP-MCNC: 3.7 G/DL (ref 3.5–5)
ALP SERPL-CCNC: 63 U/L (ref 46–116)
ALT SERPL W P-5'-P-CCNC: 34 U/L (ref 12–78)
ANION GAP SERPL CALCULATED.3IONS-SCNC: 6 MMOL/L (ref 4–13)
AST SERPL W P-5'-P-CCNC: 23 U/L (ref 5–45)
BASOPHILS # BLD AUTO: 0.1 THOUSANDS/ΜL (ref 0–0.1)
BASOPHILS NFR BLD AUTO: 1 % (ref 0–1)
BILIRUB SERPL-MCNC: 0.4 MG/DL (ref 0.2–1)
BUN SERPL-MCNC: 17 MG/DL (ref 5–25)
CALCIUM SERPL-MCNC: 8.7 MG/DL (ref 8.3–10.1)
CHLORIDE SERPL-SCNC: 106 MMOL/L (ref 100–108)
CO2 SERPL-SCNC: 28 MMOL/L (ref 21–32)
CREAT SERPL-MCNC: 0.78 MG/DL (ref 0.6–1.3)
CRP SERPL QL: <0.5 MG/L
EOSINOPHIL # BLD AUTO: 0.25 THOUSAND/ΜL (ref 0–0.61)
EOSINOPHIL NFR BLD AUTO: 3 % (ref 0–6)
ERYTHROCYTE [DISTWIDTH] IN BLOOD BY AUTOMATED COUNT: 13.4 % (ref 11.6–15.1)
GFR SERPL CREATININE-BSD FRML MDRD: 91 ML/MIN/1.73SQ M
GLUCOSE SERPL-MCNC: 90 MG/DL (ref 65–140)
HCT VFR BLD AUTO: 40.3 % (ref 34.8–46.1)
HGB BLD-MCNC: 13.4 G/DL (ref 11.5–15.4)
IMM GRANULOCYTES # BLD AUTO: 0.02 THOUSAND/UL (ref 0–0.2)
IMM GRANULOCYTES NFR BLD AUTO: 0 % (ref 0–2)
LYMPHOCYTES # BLD AUTO: 2.3 THOUSANDS/ΜL (ref 0.6–4.47)
LYMPHOCYTES NFR BLD AUTO: 30 % (ref 14–44)
MCH RBC QN AUTO: 32.4 PG (ref 26.8–34.3)
MCHC RBC AUTO-ENTMCNC: 33.3 G/DL (ref 31.4–37.4)
MCV RBC AUTO: 97 FL (ref 82–98)
MONOCYTES # BLD AUTO: 0.73 THOUSAND/ΜL (ref 0.17–1.22)
MONOCYTES NFR BLD AUTO: 10 % (ref 4–12)
NEUTROPHILS # BLD AUTO: 4.3 THOUSANDS/ΜL (ref 1.85–7.62)
NEUTS SEG NFR BLD AUTO: 56 % (ref 43–75)
NRBC BLD AUTO-RTO: 0 /100 WBCS
PLATELET # BLD AUTO: 421 THOUSANDS/UL (ref 149–390)
PMV BLD AUTO: 9.3 FL (ref 8.9–12.7)
POTASSIUM SERPL-SCNC: 3.6 MMOL/L (ref 3.5–5.3)
PROT SERPL-MCNC: 8.5 G/DL (ref 6.4–8.2)
RBC # BLD AUTO: 4.14 MILLION/UL (ref 3.81–5.12)
SODIUM SERPL-SCNC: 140 MMOL/L (ref 136–145)
VIT B12 SERPL-MCNC: 402 PG/ML (ref 100–900)
WBC # BLD AUTO: 7.7 THOUSAND/UL (ref 4.31–10.16)

## 2020-06-12 PROCEDURE — 82306 VITAMIN D 25 HYDROXY: CPT

## 2020-06-12 PROCEDURE — 86235 NUCLEAR ANTIGEN ANTIBODY: CPT

## 2020-06-12 PROCEDURE — 80145 DRUG ASSAY ADALIMUMAB: CPT

## 2020-06-12 PROCEDURE — 86430 RHEUMATOID FACTOR TEST QUAL: CPT

## 2020-06-12 PROCEDURE — 85025 COMPLETE CBC W/AUTO DIFF WBC: CPT

## 2020-06-12 PROCEDURE — 82397 CHEMILUMINESCENT ASSAY: CPT

## 2020-06-12 PROCEDURE — 80053 COMPREHEN METABOLIC PANEL: CPT

## 2020-06-12 PROCEDURE — 86038 ANTINUCLEAR ANTIBODIES: CPT

## 2020-06-12 PROCEDURE — 83520 IMMUNOASSAY QUANT NOS NONAB: CPT

## 2020-06-12 PROCEDURE — 84481 FREE ASSAY (FT-3): CPT

## 2020-06-12 PROCEDURE — 86376 MICROSOMAL ANTIBODY EACH: CPT

## 2020-06-12 PROCEDURE — 84443 ASSAY THYROID STIM HORMONE: CPT

## 2020-06-12 PROCEDURE — 84439 ASSAY OF FREE THYROXINE: CPT

## 2020-06-12 PROCEDURE — 86480 TB TEST CELL IMMUN MEASURE: CPT

## 2020-06-12 PROCEDURE — 36415 COLL VENOUS BLD VENIPUNCTURE: CPT

## 2020-06-12 PROCEDURE — 86225 DNA ANTIBODY NATIVE: CPT

## 2020-06-12 PROCEDURE — 82607 VITAMIN B-12: CPT

## 2020-06-12 PROCEDURE — 86140 C-REACTIVE PROTEIN: CPT

## 2020-06-14 LAB
T3FREE SERPL-MCNC: 3.12 PG/ML (ref 2.3–4.2)
T4 FREE SERPL-MCNC: 1.06 NG/DL (ref 0.76–1.46)
TSH SERPL DL<=0.05 MIU/L-ACNC: 1.25 UIU/ML (ref 0.36–3.74)

## 2020-06-15 LAB
DSDNA AB SER-ACNC: 3 IU/ML (ref 0–9)
RHEUMATOID FACT SER QL LA: NEGATIVE
RYE IGE QN: NEGATIVE

## 2020-06-16 LAB
GAMMA INTERFERON BACKGROUND BLD IA-ACNC: 0.02 IU/ML
HISTONE IGG SER IA-ACNC: 1.1 UNITS (ref 0–0.9)
M TB IFN-G BLD-IMP: NEGATIVE
M TB IFN-G CD4+ BCKGRND COR BLD-ACNC: -0.01 IU/ML
M TB IFN-G CD4+ BCKGRND COR BLD-ACNC: -0.01 IU/ML
MITOGEN IGNF BCKGRD COR BLD-ACNC: >10 IU/ML
THYROPEROXIDASE AB SERPL-ACNC: 10 IU/ML (ref 0–34)
THYROPEROXIDASE AB SERPL-ACNC: NORMAL IU/ML
TRYPTASE SERPL-MCNC: NORMAL UG/L

## 2020-06-17 LAB — TRYPTASE SERPL-MCNC: 12.5 UG/L (ref 2.2–13.2)

## 2020-06-19 LAB
ADALIMUMAB AB SERPL-MCNC: <25 NG/ML
ADALIMUMAB SERPL-MCNC: 8.2 UG/ML

## 2020-07-09 DIAGNOSIS — K52.9 IBD (INFLAMMATORY BOWEL DISEASE): ICD-10-CM

## 2020-07-10 NOTE — TELEPHONE ENCOUNTER
Could someone please call Shannon Rees and find out why she needs a refill of prednisone? Does she think she is having an IBD flare? Last time prednisone was refilled was Jan 2020  She is currently on Humira  Thanks so much

## 2020-07-13 ENCOUNTER — TELEPHONE (OUTPATIENT)
Dept: GASTROENTEROLOGY | Facility: MEDICAL CENTER | Age: 45
End: 2020-07-13

## 2020-07-13 RX ORDER — PREDNISONE 1 MG/1
TABLET ORAL
Qty: 250 TABLET | Refills: 2 | Status: SHIPPED | OUTPATIENT
Start: 2020-07-13 | End: 2020-10-28 | Stop reason: HOSPADM

## 2020-07-13 NOTE — TELEPHONE ENCOUNTER
I spoke with the patient regarding refilling her prednisone, she states that she takes 50 mg prior to her IgG infusions as well as prior to her Humira injections and then also takes it as needed depending on possible reaction  Refill was sent to her pharmacy

## 2020-08-03 ENCOUNTER — OFFICE VISIT (OUTPATIENT)
Dept: GASTROENTEROLOGY | Facility: MEDICAL CENTER | Age: 45
End: 2020-08-03
Payer: COMMERCIAL

## 2020-08-03 VITALS
SYSTOLIC BLOOD PRESSURE: 130 MMHG | TEMPERATURE: 98.6 F | HEIGHT: 65 IN | DIASTOLIC BLOOD PRESSURE: 82 MMHG | HEART RATE: 75 BPM | BODY MASS INDEX: 24.49 KG/M2 | WEIGHT: 147 LBS

## 2020-08-03 DIAGNOSIS — R21 RASH: ICD-10-CM

## 2020-08-03 DIAGNOSIS — D84.9 IMMUNOSUPPRESSED STATUS (HCC): ICD-10-CM

## 2020-08-03 DIAGNOSIS — K50.111 CROHN'S DISEASE OF COLON WITH RECTAL BLEEDING (HCC): Primary | ICD-10-CM

## 2020-08-03 DIAGNOSIS — M25.50 ARTHRALGIA, UNSPECIFIED JOINT: ICD-10-CM

## 2020-08-03 PROCEDURE — 1036F TOBACCO NON-USER: CPT | Performed by: INTERNAL MEDICINE

## 2020-08-03 PROCEDURE — 3008F BODY MASS INDEX DOCD: CPT | Performed by: INTERNAL MEDICINE

## 2020-08-03 PROCEDURE — 99215 OFFICE O/P EST HI 40 MIN: CPT | Performed by: INTERNAL MEDICINE

## 2020-08-03 NOTE — PROGRESS NOTES
[facility] - Outpatient Follow-up Note  Joshua Vasquez 50 y o  female MRN: [id number]  Encounter: [id number]          ASSESSMENT AND PLAN:    Joshua Vasquez is a 50 y o  female with complex medical history including longstanding inflammatory bowel disease (indeterminate colitis versus UC versus Crohn's), mast cell activation syndrome, possible immuno deficiency currently receiving IV IG who presents for follow-up  She has been on Humira and she notes that she has recently started to develop skin lesions/rashes as well as worsening joint pains and she believes this may be a drug-induced lupus  She states now that it is possible she previously had a drug-induced lupus with her Remicade  Her GI symptoms have otherwise been well controlled  She did have a Humira level previously that was normal   In June she had an COREY that was negative as well as a negative double-stranded DNA  She did have a mildly elevated his stone antibody  Overall it is possible given the worsening joint pains and rash on Humira, as well as possible history of drug-induced lupus with Remicade, that she 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 a drug-induced lupus with Humira  Given her symptoms we will switch her to a different biologic  Available labs and imaging reviewed  1  Crohn's disease of colon with rectal bleeding ([facility] [address] )    2  Arthralgia, unspecified joint    3  Rash    4  Immunosuppressed status ([email or web address] )        Orders Placed This Encounter   Procedures    CBC and differential    Comprehensive metabolic panel    C-reactive protein    COREY Screen w/ Reflex to Titer/Pattern    Anti-DNA antibody, double-stranded    Histone Antibody     Given her concern for Humira related rash and worsening joint pains, will stop the Humira  We discussed alternatives and will plan to start Stelara to help her Crohn's and joint pains  We discussed the risks, benefits, alternatives  Repeat blood work, including CBC, CMP, CRP    We will also check COREY, anti double stranded DNA antibody, his stone antibody  I recommended she follow-up with dermatology and rheumatology  Continue to follow-up with her other providers  Consider colonoscopy in the fall for dysplasia surveillance and to assess degree of inflammation  Continue to follow-up with provider at [address] for mast cell activation syndrome  DEXA scan in the future  ______________________________________________________________________    SUBJECTIVE:    Radha Maldonado is a 50 y o  female who presents with complaint of IBD here for follow-up  Overall she is doing better, but on the remicade she developed a drug induced lupus and she is concerned she is getting a repeat of the drug induced lupus  She is getting bumps on her head and arms  She has been using a steroid cream and antibiotic cream and does not believe that it worked  She had something similar with the remicade  It is getting bigger  She finds that she is better the 2nd week after her injection, and that it gets worse the week after she injects  She has been having joint pains (hands and feet) and it got worse with the Humira  She feels like the back of her legs tighten  Better when active, but it gets worse when sitting and not moving  She had some swelling when she cut out tea, and she reintroduced it back to help decrease the swelling  She also finds that she is not constipated and no BRBPR, but she finds that her consistency has changed  She has 1 formed but soft BM, anytime per day  No pain with a BM and no urgency  No black stools  She had some oily stools when she went out  No abdominal pain  REVIEW OF SYSTEMS IS OTHERWISE NEGATIVE    10 point ROS reviewed and negative, except as above      Historical Information   Past Medical History:   Diagnosis Date    Anxiety     Asthma     Chronic kidney disease     Deep vein thrombosis (HCC)     Disease of thyroid gland     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 [address] )     Ulcerative colitis ([facility] [address] )      Past Surgical History:   Procedure Laterality Date    APPENDECTOMY      CHOLECYSTECTOMY      EGD AND COLONOSCOPY N/A 9/12/2017    Procedure: EGD AND COLONOSCOPY;  Surgeon: Tata Pak MD;  [facility]: [facility]; Service: Gastroenterology    ERCP N/A 9/15/2017    Procedure: ENDOSCOPIC RETROGRADE CHOLANGIOPANCREATOGRAPHY (ERCP); Surgeon: Tomeka Pace MD;  [facility];   Service: Gastroenterology    HYSTERECTOMY      KNEE SURGERY Right     LAPAROSCOPIC ENDOMETRIOSIS FULGURATION      ORIF ANKLE FRACTURE Left     UT KNEE SCOPE,MED/LAT MENISECTOMY Left 5/12/2017    Procedure: POSSIBLE MEDIAL MENISECTOMY;  Surgeon: Jan Taveras MD;  [facility];  Service: Orthopedics    UT KNEE [address] CART Left 5/12/2017    Procedure: KNEE ARTHROSCOPY EVALUATION, CHONDROPLASTY;  Surgeon: Jan Taveras MD;  [facility];  Service: Orthopedics    UT LAP,DIAGNOSTIC ABDOMEN N/A 12/12/2017    Procedure: LAPAROSCOPY DIAGNOSTIC  WITH LYSIS OF ADHESIONS;  Surgeon: Carlo Price DO;  [facility];  Service: General     Social History   Social History     Substance and Sexual Activity   Alcohol Use Never    Frequency: Never    Binge frequency: Never     Social History     Substance and Sexual Activity   Drug Use Not Currently     Social History     Tobacco Use   Smoking Status Never Smoker   Smokeless Tobacco Never Used     Family History   Problem Relation Age of Onset    Ulcerative colitis Mother     Heart failure Father     Neuropathy Father     Macular degeneration Father     Diabetes Father     Asthma Daughter     Hypothyroidism Daughter     Heart disease Maternal Grandmother     Arthritis Maternal Grandmother     Arthritis Paternal Grandmother     Macular degeneration Paternal Grandmother     Lymphoma Paternal Grandfather     Heart failure Paternal Aunt     Heart failure Paternal Aunt     Breast cancer Paternal Aunt 47    Breast cancer additional onset Paternal Aunt 62    Hypothyroidism Paternal Aunt     Breast cancer Maternal Aunt     No Known Problems Maternal Grandfather        Meds/Allergies       Current Outpatient Medications:     aspirin 81 mg chewable tablet    butalbital-acetaminophen-caffeine (FIORICET,ESGIC) -40 mg per tablet    cetirizine (ZyrTEC) 10 mg tablet    cholecalciferol (VITAMIN D) 400 units/mL    diphenhydrAMINE (BENADRYL) 25 mg tablet    EPINEPHrine (EPIPEN 2-MARYSOL IJ)    Fexofenadine HCl (MUCINEX ALLERGY PO)    levothyroxine (LEVOXYL) 50 mcg tablet    Probiotic Product (PROBIOTIC DAILY PO)    temazepam (RESTORIL) 30 mg capsule    Adalimumab (HUMIRA PEN) 40 MG/0 4ML PNKT    Adalimumab (HUMIRA PEN) 40 MG/0 8ML PNKT    Adalimumab (HUMIRA PEN) 40 MG/0 8ML PNKT    Ketotifen Fumarate POWD    LORazepam (ATIVAN) 1 mg tablet    omalizumab (XOLAIR) 150 mg    polyethylene glycol (MIRALAX) 17 g packet    predniSONE 10 mg tablet    predniSONE 5 mg tablet    Current Facility-Administered Medications:     cyanocobalamin injection 1,000 mcg, 1,000 mcg, Intramuscular, Q30 Days, 1,000 mcg at 10/25/19 1204    Allergies   Allergen Reactions    Amitriptyline Anaphylaxis     According to the patient she had nphylaxis    Aspergillus Fumigatus Other (See Comments), Hives and Swelling    Azathioprine Other (See Comments) and Anaphylaxis     pancreatitis  According to patient she needed Epipen    Eggs Or Egg-Derived Products Anaphylaxis    Sumatriptan Anaphylaxis     Bradycardia, sob, back pressure, head pain,throat tightness  According to the patient she had anphylaxis    Contrast [Iodinated Diagnostic Agents]      Pt states needs to be premedicated prior to injection    Corn Dextrin      Any corn based products    Corn Oil Hives    Gelatin     Histamine      Intolerance      Ibuprofen Hives and Throat Swelling    Malto Dextrin [Dextrin]     Other      Gel caps, maltodextrose, dextrose,grapes    Penicillium Notatum     Sulfa Antibiotics Hives and Other (See Comments)    Sulfate     Sulfites            Objective     Blood pressure 130/82, pulse 75, temperature 98 6 °F (37 °C), temperature source Tympanic Core, height 5' 5", weight 66 7 kg (147 lb), not currently breastfeeding  Body mass index is 24 46 kg/m²  PHYSICAL EXAMINATION:    General Appearance:   Alert, cooperative, no distress   HEENT:  Normocephalic, atraumatic, anicteric  Neck supple, symmetrical, trachea midline  Lungs:   Equal chest rise and unlabored breathing, normal effort, no coughing  Cardiovascular:   No visualized JVD  Abdomen:   No abdominal distension  No TTP   Skin:   No jaundice  + rash (right armpit)  Musculoskeletal:   Normal range of motion visualized  Psych:  Normal affect and normal insight  Neuro:  Alert and appropriate  Lab Results:   No visits with results within 1 Day(s) from this visit     Latest known visit with results is:   Appointment on 06/12/2020   Component Date Value    ds DNA Ab 06/12/2020 3     COREY 06/12/2020 Negative     Histone Ab 06/12/2020 1 1*    Rheumatoid Factor 06/12/2020 Negative     ADALIMUMAB DRUG LEVEL 06/12/2020 8 2     ANTI-ADALIMUMAB ANTIBODY 06/12/2020 <25     WBC 06/12/2020 7 70     RBC 06/12/2020 4 14     Hemoglobin 06/12/2020 13 4     Hematocrit 06/12/2020 40 3     MCV 06/12/2020 97     MCH 06/12/2020 32 4     MCHC 06/12/2020 33 3     RDW 06/12/2020 13 4     MPV 06/12/2020 9 3     Platelets 43/59/0915 421*    nRBC 06/12/2020 0     Neutrophils Relative 06/12/2020 56     Immat GRANS % 06/12/2020 0     Lymphocytes Relative 06/12/2020 30     Monocytes Relative 06/12/2020 10     Eosinophils Relative 06/12/2020 3     Basophils Relative 06/12/2020 1     Neutrophils Absolute 06/12/2020 4 30     Immature Grans Absolute 06/12/2020 0 02     Lymphocytes Absolute 06/12/2020 2 30     Monocytes Absolute 06/12/2020 0 73     Eosinophils Absolute 06/12/2020 0 25     Basophils Absolute 06/12/2020 0 10     Sodium 06/12/2020 140     Potassium 06/12/2020 3 6     Chloride 06/12/2020 106     CO2 06/12/2020 28     ANION GAP 06/12/2020 6     BUN 06/12/2020 17     Creatinine 06/12/2020 0 78     Glucose 06/12/2020 90     Calcium 06/12/2020 8 7     AST 06/12/2020 23     ALT 06/12/2020 34     Alkaline Phosphatase 06/12/2020 63     Total Protein 06/12/2020 8 5*    Albumin 06/12/2020 3 7     Total Bilirubin 06/12/2020 0 40     eGFR 06/12/2020 91     CRP 06/12/2020 <0 5     Vitamin B-12 06/12/2020 402     Vit D, 25-Hydroxy 06/12/2020 22 1*    QFT Nil 06/12/2020 0 02     QFT TB1-NIL 06/12/2020 -0 01     QFT TB2-NIL 06/12/2020 -0 01     QFT Mitogen-NIL 06/12/2020 >10 00     QFT Final Interpretation 06/12/2020 Negative     TSH 3RD GENERATON 06/12/2020 1  247     T3, Free 06/13/2020 3 12     Free T4 06/13/2020 1 06     Tryptase 06/12/2020 COMMENT     THYROID MICROSOMAL ANTIB* 06/12/2020 COMMENT     THYROID MICROSOMAL ANTIB* 06/12/2020 10     Tryptase 06/12/2020 12 5        Lab Results   Component Value Date    WBC 7 70 06/12/2020    HGB 13 4 06/12/2020    HCT 40 3 06/12/2020    MCV 97 06/12/2020     (H) 06/12/2020       Lab Results   Component Value Date     01/16/2015    SODIUM 140 06/12/2020    K 3 6 06/12/2020     06/12/2020    CO2 28 06/12/2020    ANIONGAP 12 01/16/2015    AGAP 6 06/12/2020    BUN 17 06/12/2020    CREATININE 0 78 06/12/2020    GLUC 90 06/12/2020    GLUF 86 12/06/2017    CALCIUM 8 7 06/12/2020    AST 23 06/12/2020    ALT 34 06/12/2020    ALKPHOS 63 06/12/2020    PROT 7 7 01/16/2015    TP 8 5 (H) 06/12/2020    BILITOT 0 7 01/16/2015    TBILI 0 40 06/12/2020    EGFR 91 06/12/2020       Lab Results   Component Value Date    CRP <0 5 06/12/2020       Lab Results   Component Value Date    WFW5ZSEUPZMY 1 247 06/12/2020       Lab Results   Component Value Date    IRON 190 (H) 11/01/2019    TIBC 355 11/01/2019    FERRITIN 48 11/01/2019       Radiology Results:   No results found

## 2020-08-05 ENCOUNTER — APPOINTMENT (OUTPATIENT)
Dept: LAB | Facility: HOSPITAL | Age: 45
End: 2020-08-05
Attending: INTERNAL MEDICINE
Payer: COMMERCIAL

## 2020-08-05 ENCOUNTER — TRANSCRIBE ORDERS (OUTPATIENT)
Dept: ADMINISTRATIVE | Facility: HOSPITAL | Age: 45
End: 2020-08-05

## 2020-08-05 DIAGNOSIS — K50.111 CROHN'S DISEASE OF COLON WITH RECTAL BLEEDING (HCC): ICD-10-CM

## 2020-08-05 DIAGNOSIS — E03.2 IATROGENIC HYPOTHYROIDISM: Primary | ICD-10-CM

## 2020-08-05 DIAGNOSIS — E05.90 THYROTOXICOSIS WITHOUT THYROID STORM, UNSPECIFIED THYROTOXICOSIS TYPE: ICD-10-CM

## 2020-08-05 DIAGNOSIS — E03.2 IATROGENIC HYPOTHYROIDISM: ICD-10-CM

## 2020-08-05 LAB
ALBUMIN SERPL BCP-MCNC: 3.9 G/DL (ref 3.5–5)
ALP SERPL-CCNC: 53 U/L (ref 46–116)
ALT SERPL W P-5'-P-CCNC: 33 U/L (ref 12–78)
ANION GAP SERPL CALCULATED.3IONS-SCNC: 8 MMOL/L (ref 4–13)
AST SERPL W P-5'-P-CCNC: 19 U/L (ref 5–45)
BASOPHILS # BLD AUTO: 0.14 THOUSANDS/ΜL (ref 0–0.1)
BASOPHILS NFR BLD AUTO: 3 % (ref 0–1)
BILIRUB SERPL-MCNC: 0.4 MG/DL (ref 0.2–1)
BUN SERPL-MCNC: 10 MG/DL (ref 5–25)
CALCIUM SERPL-MCNC: 8.6 MG/DL (ref 8.3–10.1)
CHLORIDE SERPL-SCNC: 103 MMOL/L (ref 100–108)
CO2 SERPL-SCNC: 28 MMOL/L (ref 21–32)
CREAT SERPL-MCNC: 0.83 MG/DL (ref 0.6–1.3)
CRP SERPL QL: <0.5 MG/L
EOSINOPHIL # BLD AUTO: 0.21 THOUSAND/ΜL (ref 0–0.61)
EOSINOPHIL NFR BLD AUTO: 4 % (ref 0–6)
ERYTHROCYTE [DISTWIDTH] IN BLOOD BY AUTOMATED COUNT: 12.2 % (ref 11.6–15.1)
GFR SERPL CREATININE-BSD FRML MDRD: 84 ML/MIN/1.73SQ M
GLUCOSE P FAST SERPL-MCNC: 101 MG/DL (ref 65–99)
HCT VFR BLD AUTO: 43.4 % (ref 34.8–46.1)
HGB BLD-MCNC: 14.3 G/DL (ref 11.5–15.4)
IMM GRANULOCYTES # BLD AUTO: 0.01 THOUSAND/UL (ref 0–0.2)
IMM GRANULOCYTES NFR BLD AUTO: 0 % (ref 0–2)
LYMPHOCYTES # BLD AUTO: 2.02 THOUSANDS/ΜL (ref 0.6–4.47)
LYMPHOCYTES NFR BLD AUTO: 41 % (ref 14–44)
MCH RBC QN AUTO: 31.2 PG (ref 26.8–34.3)
MCHC RBC AUTO-ENTMCNC: 32.9 G/DL (ref 31.4–37.4)
MCV RBC AUTO: 95 FL (ref 82–98)
MONOCYTES # BLD AUTO: 0.35 THOUSAND/ΜL (ref 0.17–1.22)
MONOCYTES NFR BLD AUTO: 7 % (ref 4–12)
NEUTROPHILS # BLD AUTO: 2.21 THOUSANDS/ΜL (ref 1.85–7.62)
NEUTS SEG NFR BLD AUTO: 45 % (ref 43–75)
NRBC BLD AUTO-RTO: 0 /100 WBCS
PLATELET # BLD AUTO: 381 THOUSANDS/UL (ref 149–390)
PMV BLD AUTO: 9.5 FL (ref 8.9–12.7)
POTASSIUM SERPL-SCNC: 3.7 MMOL/L (ref 3.5–5.3)
PROT SERPL-MCNC: 7.7 G/DL (ref 6.4–8.2)
RBC # BLD AUTO: 4.59 MILLION/UL (ref 3.81–5.12)
SODIUM SERPL-SCNC: 139 MMOL/L (ref 136–145)
T3FREE SERPL-MCNC: 2.45 PG/ML (ref 2.3–4.2)
T4 FREE SERPL-MCNC: 0.97 NG/DL (ref 0.76–1.46)
TSH SERPL DL<=0.05 MIU/L-ACNC: 0.82 UIU/ML (ref 0.36–3.74)
WBC # BLD AUTO: 4.94 THOUSAND/UL (ref 4.31–10.16)

## 2020-08-05 PROCEDURE — 84481 FREE ASSAY (FT-3): CPT

## 2020-08-05 PROCEDURE — 86140 C-REACTIVE PROTEIN: CPT

## 2020-08-05 PROCEDURE — 36415 COLL VENOUS BLD VENIPUNCTURE: CPT

## 2020-08-05 PROCEDURE — 85025 COMPLETE CBC W/AUTO DIFF WBC: CPT

## 2020-08-05 PROCEDURE — 86225 DNA ANTIBODY NATIVE: CPT

## 2020-08-05 PROCEDURE — 86038 ANTINUCLEAR ANTIBODIES: CPT

## 2020-08-05 PROCEDURE — 86235 NUCLEAR ANTIGEN ANTIBODY: CPT

## 2020-08-05 PROCEDURE — 84432 ASSAY OF THYROGLOBULIN: CPT

## 2020-08-05 PROCEDURE — 80053 COMPREHEN METABOLIC PANEL: CPT

## 2020-08-05 PROCEDURE — 86800 THYROGLOBULIN ANTIBODY: CPT

## 2020-08-05 PROCEDURE — 84443 ASSAY THYROID STIM HORMONE: CPT

## 2020-08-05 PROCEDURE — 84439 ASSAY OF FREE THYROXINE: CPT

## 2020-08-06 DIAGNOSIS — F51.04 PSYCHOPHYSIOLOGICAL INSOMNIA: ICD-10-CM

## 2020-08-06 LAB — DSDNA AB SER-ACNC: 2 IU/ML (ref 0–9)

## 2020-08-07 LAB
HISTONE IGG SER IA-ACNC: 1.3 UNITS (ref 0–0.9)
RYE IGE QN: NEGATIVE

## 2020-08-07 RX ORDER — TEMAZEPAM 30 MG/1
30 CAPSULE ORAL
Qty: 30 CAPSULE | Refills: 3 | Status: SHIPPED | OUTPATIENT
Start: 2020-08-07 | End: 2020-10-29 | Stop reason: SDUPTHER

## 2020-08-07 NOTE — TELEPHONE ENCOUNTER
Returned the patients call  She wants her medication refilled tonight  She is out of medication  Advised I would sent Dr Chaneta Collet a message

## 2020-08-10 LAB
THYROGLOB AB SERPL-ACNC: 1.1 IU/ML (ref 0–0.9)
THYROGLOB SERPL-MCNC: 10 NG/ML

## 2020-08-26 ENCOUNTER — TELEPHONE (OUTPATIENT)
Dept: GASTROENTEROLOGY | Facility: MEDICAL CENTER | Age: 45
End: 2020-08-26

## 2020-09-29 ENCOUNTER — TELEPHONE (OUTPATIENT)
Dept: GASTROENTEROLOGY | Facility: MEDICAL CENTER | Age: 45
End: 2020-09-29

## 2020-09-29 DIAGNOSIS — F41.9 ANXIETY: ICD-10-CM

## 2020-09-29 DIAGNOSIS — K50.111 CROHN'S DISEASE OF COLON WITH RECTAL BLEEDING (HCC): Primary | ICD-10-CM

## 2020-09-29 RX ORDER — LORAZEPAM 1 MG/1
1 TABLET ORAL EVERY 8 HOURS PRN
Qty: 21 TABLET | Refills: 0 | Status: CANCELLED | OUTPATIENT
Start: 2020-09-29

## 2020-09-29 RX ORDER — BUDESONIDE 3 MG/1
3 CAPSULE, COATED PELLETS ORAL DAILY
Qty: 30 CAPSULE | Refills: 3 | Status: SHIPPED | OUTPATIENT
Start: 2020-09-29 | End: 2020-10-05 | Stop reason: SDUPTHER

## 2020-09-29 NOTE — TELEPHONE ENCOUNTER
Patient called asking for a script for Budesonide 3mg  This is what the patient has been taking for her IBD and she states she feels the best  She would like to continue on this and if it stops working she will try the Yong Aguirre  I stated that the Stelara was approved and she stated she would like to hold off unless Budesonide stops working       Please advise

## 2020-09-30 NOTE — TELEPHONE ENCOUNTER
Patient is aware of the medication being sent to the pharmacy   She will make sure to update us with any changes in her symptoms

## 2020-10-05 ENCOUNTER — TELEPHONE (OUTPATIENT)
Dept: GASTROENTEROLOGY | Facility: MEDICAL CENTER | Age: 45
End: 2020-10-05

## 2020-10-05 DIAGNOSIS — K50.111 CROHN'S DISEASE OF COLON WITH RECTAL BLEEDING (HCC): ICD-10-CM

## 2020-10-05 RX ORDER — BUDESONIDE 3 MG/1
9 CAPSULE, COATED PELLETS ORAL DAILY
Qty: 90 CAPSULE | Refills: 3 | Status: SHIPPED | OUTPATIENT
Start: 2020-10-05 | End: 2021-01-20

## 2020-10-17 ENCOUNTER — APPOINTMENT (EMERGENCY)
Dept: RADIOLOGY | Facility: HOSPITAL | Age: 45
DRG: 815 | End: 2020-10-17
Payer: COMMERCIAL

## 2020-10-17 ENCOUNTER — HOSPITAL ENCOUNTER (INPATIENT)
Facility: HOSPITAL | Age: 45
LOS: 11 days | Discharge: HOME | DRG: 815 | End: 2020-10-28
Attending: EMERGENCY MEDICINE | Admitting: FAMILY MEDICINE
Payer: COMMERCIAL

## 2020-10-17 DIAGNOSIS — K51.919 ULCERATIVE COLITIS WITH COMPLICATION, UNSPECIFIED LOCATION (HCC): ICD-10-CM

## 2020-10-17 DIAGNOSIS — R63.0 ANOREXIA: ICD-10-CM

## 2020-10-17 DIAGNOSIS — R10.84 GENERALIZED ABDOMINAL PAIN: ICD-10-CM

## 2020-10-17 DIAGNOSIS — D89.40 MAST CELL ACTIVATION SYNDROME (HCC): ICD-10-CM

## 2020-10-17 DIAGNOSIS — R11.2 NAUSEA & VOMITING: Primary | ICD-10-CM

## 2020-10-17 PROBLEM — R65.21 SEPTIC SHOCK (HCC): Status: RESOLVED | Noted: 2017-08-25 | Resolved: 2020-10-17

## 2020-10-17 PROBLEM — R65.10 SIRS (SYSTEMIC INFLAMMATORY RESPONSE SYNDROME) (HCC): Status: RESOLVED | Noted: 2017-08-25 | Resolved: 2020-10-17

## 2020-10-17 PROBLEM — R06.02 SHORTNESS OF BREATH: Status: ACTIVE | Noted: 2020-10-17

## 2020-10-17 PROBLEM — K51.90 ULCERATIVE COLITIS (HCC): Status: ACTIVE | Noted: 2020-10-17

## 2020-10-17 PROBLEM — A41.9 SEPTIC SHOCK (HCC): Status: RESOLVED | Noted: 2017-08-25 | Resolved: 2020-10-17

## 2020-10-17 LAB
ALBUMIN SERPL BCP-MCNC: 3.9 G/DL (ref 3.5–5)
ALP SERPL-CCNC: 52 U/L (ref 46–116)
ALT SERPL W P-5'-P-CCNC: 36 U/L (ref 12–78)
ANION GAP SERPL CALCULATED.3IONS-SCNC: 6 MMOL/L (ref 4–13)
AST SERPL W P-5'-P-CCNC: 43 U/L (ref 5–45)
ATRIAL RATE: 76 BPM
BACTERIA UR QL AUTO: NORMAL /HPF
BASOPHILS # BLD AUTO: 0.09 THOUSANDS/ΜL (ref 0–0.1)
BASOPHILS NFR BLD AUTO: 1 % (ref 0–1)
BILIRUB SERPL-MCNC: 0.77 MG/DL (ref 0.2–1)
BILIRUB UR QL STRIP: NEGATIVE
BUN SERPL-MCNC: 8 MG/DL (ref 5–25)
CALCIUM SERPL-MCNC: 8.9 MG/DL (ref 8.3–10.1)
CHLORIDE SERPL-SCNC: 106 MMOL/L (ref 100–108)
CLARITY UR: CLEAR
CO2 SERPL-SCNC: 25 MMOL/L (ref 21–32)
COLOR UR: YELLOW
COLOR, POC: NORMAL
CREAT SERPL-MCNC: 0.65 MG/DL (ref 0.6–1.3)
CRP SERPL QL: <3 MG/L
D DIMER PPP FEU-MCNC: 0.52 UG/ML FEU
EOSINOPHIL # BLD AUTO: 0.1 THOUSAND/ΜL (ref 0–0.61)
EOSINOPHIL NFR BLD AUTO: 1 % (ref 0–6)
ERYTHROCYTE [DISTWIDTH] IN BLOOD BY AUTOMATED COUNT: 13.2 % (ref 11.6–15.1)
ERYTHROCYTE [SEDIMENTATION RATE] IN BLOOD: 16 MM/HOUR (ref 0–19)
EXT PREG TEST URINE: NEGATIVE
EXT. CONTROL ED NAV: NORMAL
FLUAV RNA NPH QL NAA+PROBE: NORMAL
FLUBV RNA NPH QL NAA+PROBE: NORMAL
GFR SERPL CREATININE-BSD FRML MDRD: 106 ML/MIN/1.73SQ M
GLUCOSE SERPL-MCNC: 92 MG/DL (ref 65–140)
GLUCOSE UR STRIP-MCNC: NEGATIVE MG/DL
HCT VFR BLD AUTO: 44.2 % (ref 34.8–46.1)
HGB BLD-MCNC: 15.1 G/DL (ref 11.5–15.4)
HGB UR QL STRIP.AUTO: ABNORMAL
IMM GRANULOCYTES # BLD AUTO: 0.01 THOUSAND/UL (ref 0–0.2)
IMM GRANULOCYTES NFR BLD AUTO: 0 % (ref 0–2)
KETONES UR STRIP-MCNC: ABNORMAL MG/DL
LACTATE SERPL-SCNC: 1.2 MMOL/L (ref 0.5–2)
LEUKOCYTE ESTERASE UR QL STRIP: NEGATIVE
LIPASE SERPL-CCNC: 55 U/L (ref 73–393)
LYMPHOCYTES # BLD AUTO: 2.05 THOUSANDS/ΜL (ref 0.6–4.47)
LYMPHOCYTES NFR BLD AUTO: 27 % (ref 14–44)
MCH RBC QN AUTO: 31.9 PG (ref 26.8–34.3)
MCHC RBC AUTO-ENTMCNC: 34.2 G/DL (ref 31.4–37.4)
MCV RBC AUTO: 93 FL (ref 82–98)
MONOCYTES # BLD AUTO: 0.57 THOUSAND/ΜL (ref 0.17–1.22)
MONOCYTES NFR BLD AUTO: 7 % (ref 4–12)
MUCOUS THREADS UR QL AUTO: NORMAL
NEUTROPHILS # BLD AUTO: 4.86 THOUSANDS/ΜL (ref 1.85–7.62)
NEUTS SEG NFR BLD AUTO: 64 % (ref 43–75)
NITRITE UR QL STRIP: NEGATIVE
NON-SQ EPI CELLS URNS QL MICRO: NORMAL /HPF
NRBC BLD AUTO-RTO: 0 /100 WBCS
P AXIS: 62 DEGREES
PH UR STRIP.AUTO: 7 [PH] (ref 4.5–8)
PLATELET # BLD AUTO: 318 THOUSANDS/UL (ref 149–390)
PMV BLD AUTO: 8.8 FL (ref 8.9–12.7)
POTASSIUM SERPL-SCNC: 4.4 MMOL/L (ref 3.5–5.3)
PR INTERVAL: 104 MS
PROT SERPL-MCNC: 8.4 G/DL (ref 6.4–8.2)
PROT UR STRIP-MCNC: NEGATIVE MG/DL
QRS AXIS: 80 DEGREES
QRSD INTERVAL: 82 MS
QT INTERVAL: 360 MS
QTC INTERVAL: 405 MS
RBC # BLD AUTO: 4.73 MILLION/UL (ref 3.81–5.12)
RBC #/AREA URNS AUTO: NORMAL /HPF
RSV RNA NPH QL NAA+PROBE: NORMAL
SARS-COV-2 RNA RESP QL NAA+PROBE: NEGATIVE
SODIUM SERPL-SCNC: 137 MMOL/L (ref 136–145)
SP GR UR STRIP.AUTO: 1.02 (ref 1–1.03)
T WAVE AXIS: 53 DEGREES
TROPONIN I SERPL-MCNC: <0.02 NG/ML
TSH SERPL DL<=0.05 MIU/L-ACNC: 2.11 UIU/ML (ref 0.36–3.74)
UROBILINOGEN UR QL STRIP.AUTO: 0.2 E.U./DL
VENTRICULAR RATE: 76 BPM
WBC # BLD AUTO: 7.68 THOUSAND/UL (ref 4.31–10.16)
WBC #/AREA URNS AUTO: NORMAL /HPF

## 2020-10-17 PROCEDURE — 36415 COLL VENOUS BLD VENIPUNCTURE: CPT | Performed by: EMERGENCY MEDICINE

## 2020-10-17 PROCEDURE — 86140 C-REACTIVE PROTEIN: CPT | Performed by: EMERGENCY MEDICINE

## 2020-10-17 PROCEDURE — 85379 FIBRIN DEGRADATION QUANT: CPT | Performed by: EMERGENCY MEDICINE

## 2020-10-17 PROCEDURE — 83690 ASSAY OF LIPASE: CPT | Performed by: EMERGENCY MEDICINE

## 2020-10-17 PROCEDURE — 93005 ELECTROCARDIOGRAM TRACING: CPT

## 2020-10-17 PROCEDURE — 85652 RBC SED RATE AUTOMATED: CPT | Performed by: EMERGENCY MEDICINE

## 2020-10-17 PROCEDURE — 99285 EMERGENCY DEPT VISIT HI MDM: CPT

## 2020-10-17 PROCEDURE — 96361 HYDRATE IV INFUSION ADD-ON: CPT

## 2020-10-17 PROCEDURE — 83605 ASSAY OF LACTIC ACID: CPT | Performed by: EMERGENCY MEDICINE

## 2020-10-17 PROCEDURE — 81025 URINE PREGNANCY TEST: CPT | Performed by: EMERGENCY MEDICINE

## 2020-10-17 PROCEDURE — 99223 1ST HOSP IP/OBS HIGH 75: CPT | Performed by: HOSPITALIST

## 2020-10-17 PROCEDURE — 84443 ASSAY THYROID STIM HORMONE: CPT | Performed by: HOSPITALIST

## 2020-10-17 PROCEDURE — 71045 X-RAY EXAM CHEST 1 VIEW: CPT

## 2020-10-17 PROCEDURE — 96374 THER/PROPH/DIAG INJ IV PUSH: CPT

## 2020-10-17 PROCEDURE — 80053 COMPREHEN METABOLIC PANEL: CPT | Performed by: EMERGENCY MEDICINE

## 2020-10-17 PROCEDURE — 83520 IMMUNOASSAY QUANT NOS NONAB: CPT | Performed by: EMERGENCY MEDICINE

## 2020-10-17 PROCEDURE — 84484 ASSAY OF TROPONIN QUANT: CPT | Performed by: EMERGENCY MEDICINE

## 2020-10-17 PROCEDURE — 85025 COMPLETE CBC W/AUTO DIFF WBC: CPT | Performed by: EMERGENCY MEDICINE

## 2020-10-17 PROCEDURE — 99285 EMERGENCY DEPT VISIT HI MDM: CPT | Performed by: EMERGENCY MEDICINE

## 2020-10-17 PROCEDURE — 93010 ELECTROCARDIOGRAM REPORT: CPT | Performed by: INTERNAL MEDICINE

## 2020-10-17 PROCEDURE — 87631 RESP VIRUS 3-5 TARGETS: CPT | Performed by: EMERGENCY MEDICINE

## 2020-10-17 PROCEDURE — 81001 URINALYSIS AUTO W/SCOPE: CPT

## 2020-10-17 PROCEDURE — 96375 TX/PRO/DX INJ NEW DRUG ADDON: CPT

## 2020-10-17 PROCEDURE — U0003 INFECTIOUS AGENT DETECTION BY NUCLEIC ACID (DNA OR RNA); SEVERE ACUTE RESPIRATORY SYNDROME CORONAVIRUS 2 (SARS-COV-2) (CORONAVIRUS DISEASE [COVID-19]), AMPLIFIED PROBE TECHNIQUE, MAKING USE OF HIGH THROUGHPUT TECHNOLOGIES AS DESCRIBED BY CMS-2020-01-R: HCPCS | Performed by: EMERGENCY MEDICINE

## 2020-10-17 RX ORDER — SODIUM CHLORIDE 9 MG/ML
125 INJECTION, SOLUTION INTRAVENOUS CONTINUOUS
Status: DISCONTINUED | OUTPATIENT
Start: 2020-10-17 | End: 2020-10-20

## 2020-10-17 RX ORDER — BUTALBITAL, ACETAMINOPHEN AND CAFFEINE 50; 325; 40 MG/1; MG/1; MG/1
1 TABLET ORAL EVERY 8 HOURS PRN
Status: DISCONTINUED | OUTPATIENT
Start: 2020-10-17 | End: 2020-10-28 | Stop reason: HOSPADM

## 2020-10-17 RX ORDER — DIPHENHYDRAMINE HYDROCHLORIDE 50 MG/ML
50 INJECTION INTRAMUSCULAR; INTRAVENOUS
Status: DISCONTINUED | OUTPATIENT
Start: 2020-10-17 | End: 2020-10-18

## 2020-10-17 RX ORDER — ONDANSETRON 2 MG/ML
4 INJECTION INTRAMUSCULAR; INTRAVENOUS EVERY 6 HOURS PRN
Status: DISCONTINUED | OUTPATIENT
Start: 2020-10-17 | End: 2020-10-28

## 2020-10-17 RX ORDER — DIPHENHYDRAMINE HYDROCHLORIDE 50 MG/ML
25 INJECTION INTRAMUSCULAR; INTRAVENOUS ONCE
Status: COMPLETED | OUTPATIENT
Start: 2020-10-17 | End: 2020-10-17

## 2020-10-17 RX ORDER — LORAZEPAM 2 MG/ML
0.5 INJECTION INTRAMUSCULAR ONCE
Status: COMPLETED | OUTPATIENT
Start: 2020-10-17 | End: 2020-10-17

## 2020-10-17 RX ORDER — LORAZEPAM 2 MG/ML
0.5 INJECTION INTRAMUSCULAR EVERY 4 HOURS PRN
Status: DISCONTINUED | OUTPATIENT
Start: 2020-10-17 | End: 2020-10-27

## 2020-10-17 RX ORDER — ONDANSETRON 2 MG/ML
4 INJECTION INTRAMUSCULAR; INTRAVENOUS ONCE
Status: COMPLETED | OUTPATIENT
Start: 2020-10-17 | End: 2020-10-17

## 2020-10-17 RX ORDER — METHYLPREDNISOLONE SODIUM SUCCINATE 40 MG/ML
40 INJECTION, POWDER, LYOPHILIZED, FOR SOLUTION INTRAMUSCULAR; INTRAVENOUS EVERY 8 HOURS SCHEDULED
Status: DISCONTINUED | OUTPATIENT
Start: 2020-10-18 | End: 2020-10-18

## 2020-10-17 RX ORDER — BUDESONIDE 3 MG/1
9 CAPSULE, COATED PELLETS ORAL DAILY
Status: DISCONTINUED | OUTPATIENT
Start: 2020-10-17 | End: 2020-10-21 | Stop reason: SDDI

## 2020-10-17 RX ADMIN — HYDROCORTISONE SODIUM SUCCINATE 200 MG: 100 INJECTION, POWDER, FOR SOLUTION INTRAMUSCULAR; INTRAVENOUS at 18:07

## 2020-10-17 RX ADMIN — ONDANSETRON 4 MG: 2 INJECTION INTRAMUSCULAR; INTRAVENOUS at 22:03

## 2020-10-17 RX ADMIN — SODIUM CHLORIDE 1000 ML: 0.9 INJECTION, SOLUTION INTRAVENOUS at 11:35

## 2020-10-17 RX ADMIN — DIPHENHYDRAMINE HYDROCHLORIDE 50 MG: 50 INJECTION, SOLUTION INTRAMUSCULAR; INTRAVENOUS at 18:17

## 2020-10-17 RX ADMIN — LORAZEPAM 0.5 MG: 2 INJECTION INTRAMUSCULAR; INTRAVENOUS at 22:03

## 2020-10-17 RX ADMIN — LORAZEPAM 0.5 MG: 2 INJECTION INTRAMUSCULAR; INTRAVENOUS at 16:09

## 2020-10-17 RX ADMIN — LORAZEPAM 0.5 MG: 2 INJECTION INTRAMUSCULAR; INTRAVENOUS at 11:44

## 2020-10-17 RX ADMIN — DIPHENHYDRAMINE HYDROCHLORIDE 25 MG: 50 INJECTION, SOLUTION INTRAMUSCULAR; INTRAVENOUS at 11:44

## 2020-10-17 RX ADMIN — ONDANSETRON 4 MG: 2 INJECTION INTRAMUSCULAR; INTRAVENOUS at 11:35

## 2020-10-17 RX ADMIN — BUTALBITAL, ACETAMINOPHEN, AND CAFFEINE 1 TABLET: 50; 325; 40 TABLET ORAL at 18:13

## 2020-10-17 RX ADMIN — SODIUM CHLORIDE 1000 ML: 0.9 INJECTION, SOLUTION INTRAVENOUS at 15:47

## 2020-10-17 RX ADMIN — ONDANSETRON 4 MG: 2 INJECTION INTRAMUSCULAR; INTRAVENOUS at 16:15

## 2020-10-17 RX ADMIN — ENOXAPARIN SODIUM 40 MG: 40 INJECTION SUBCUTANEOUS at 16:22

## 2020-10-17 RX ADMIN — SODIUM CHLORIDE 125 ML/HR: 0.9 INJECTION, SOLUTION INTRAVENOUS at 21:01

## 2020-10-18 ENCOUNTER — APPOINTMENT (INPATIENT)
Dept: RADIOLOGY | Facility: HOSPITAL | Age: 45
DRG: 815 | End: 2020-10-18
Payer: COMMERCIAL

## 2020-10-18 PROCEDURE — 74177 CT ABD & PELVIS W/CONTRAST: CPT

## 2020-10-18 PROCEDURE — 99255 IP/OBS CONSLTJ NEW/EST HI 80: CPT | Performed by: INTERNAL MEDICINE

## 2020-10-18 PROCEDURE — 99233 SBSQ HOSP IP/OBS HIGH 50: CPT | Performed by: INTERNAL MEDICINE

## 2020-10-18 PROCEDURE — 71275 CT ANGIOGRAPHY CHEST: CPT

## 2020-10-18 PROCEDURE — G1004 CDSM NDSC: HCPCS

## 2020-10-18 RX ORDER — DIPHENHYDRAMINE HYDROCHLORIDE 50 MG/ML
50 INJECTION INTRAMUSCULAR; INTRAVENOUS
Status: DISCONTINUED | OUTPATIENT
Start: 2020-10-18 | End: 2020-10-22 | Stop reason: SDUPTHER

## 2020-10-18 RX ORDER — KETOROLAC TROMETHAMINE 30 MG/ML
15 INJECTION, SOLUTION INTRAMUSCULAR; INTRAVENOUS ONCE
Status: COMPLETED | OUTPATIENT
Start: 2020-10-18 | End: 2020-10-18

## 2020-10-18 RX ORDER — KETOROLAC TROMETHAMINE 30 MG/ML
15 INJECTION, SOLUTION INTRAMUSCULAR; INTRAVENOUS EVERY 6 HOURS PRN
Status: DISCONTINUED | OUTPATIENT
Start: 2020-10-18 | End: 2020-10-18

## 2020-10-18 RX ORDER — DIPHENHYDRAMINE HYDROCHLORIDE 50 MG/ML
50 INJECTION INTRAMUSCULAR; INTRAVENOUS EVERY 6 HOURS PRN
Status: DISCONTINUED | OUTPATIENT
Start: 2020-10-18 | End: 2020-10-18

## 2020-10-18 RX ORDER — DIPHENHYDRAMINE HYDROCHLORIDE 50 MG/ML
50 INJECTION INTRAMUSCULAR; INTRAVENOUS EVERY 4 HOURS PRN
Status: DISCONTINUED | OUTPATIENT
Start: 2020-10-19 | End: 2020-10-18

## 2020-10-18 RX ORDER — TRAMADOL HYDROCHLORIDE 50 MG/1
50 TABLET ORAL EVERY 6 HOURS PRN
Status: DISCONTINUED | OUTPATIENT
Start: 2020-10-18 | End: 2020-10-19

## 2020-10-18 RX ORDER — MONTELUKAST SODIUM 10 MG/1
10 TABLET ORAL ONCE
Status: COMPLETED | OUTPATIENT
Start: 2020-10-18 | End: 2020-10-18

## 2020-10-18 RX ORDER — METHYLPREDNISOLONE SODIUM SUCCINATE 40 MG/ML
40 INJECTION, POWDER, LYOPHILIZED, FOR SOLUTION INTRAMUSCULAR; INTRAVENOUS EVERY 8 HOURS SCHEDULED
Status: DISCONTINUED | OUTPATIENT
Start: 2020-10-19 | End: 2020-10-18

## 2020-10-18 RX ORDER — FAMOTIDINE 20 MG/1
20 TABLET, FILM COATED ORAL ONCE
Status: DISCONTINUED | OUTPATIENT
Start: 2020-10-18 | End: 2020-10-18

## 2020-10-18 RX ORDER — POLYETHYLENE GLYCOL 3350 17 G/17G
17 POWDER, FOR SOLUTION ORAL 3 TIMES DAILY
Status: DISCONTINUED | OUTPATIENT
Start: 2020-10-18 | End: 2020-10-28 | Stop reason: HOSPADM

## 2020-10-18 RX ORDER — DIPHENHYDRAMINE HYDROCHLORIDE 50 MG/ML
50 INJECTION INTRAMUSCULAR; INTRAVENOUS EVERY 6 HOURS PRN
Status: DISCONTINUED | OUTPATIENT
Start: 2020-10-18 | End: 2020-10-20

## 2020-10-18 RX ORDER — METHYLPREDNISOLONE SODIUM SUCCINATE 40 MG/ML
20 INJECTION, POWDER, LYOPHILIZED, FOR SOLUTION INTRAMUSCULAR; INTRAVENOUS EVERY 8 HOURS SCHEDULED
Status: DISCONTINUED | OUTPATIENT
Start: 2020-10-19 | End: 2020-10-25

## 2020-10-18 RX ADMIN — FAMOTIDINE 20 MG: 10 INJECTION INTRAVENOUS at 17:07

## 2020-10-18 RX ADMIN — POLYETHYLENE GLYCOL 3350 17 G: 17 POWDER, FOR SOLUTION ORAL at 21:07

## 2020-10-18 RX ADMIN — SODIUM CHLORIDE 125 ML/HR: 0.9 INJECTION, SOLUTION INTRAVENOUS at 04:56

## 2020-10-18 RX ADMIN — MONTELUKAST 10 MG: 10 TABLET, FILM COATED ORAL at 17:01

## 2020-10-18 RX ADMIN — BUTALBITAL, ACETAMINOPHEN, AND CAFFEINE 1 TABLET: 50; 325; 40 TABLET ORAL at 04:08

## 2020-10-18 RX ADMIN — IOHEXOL 50 ML: 240 INJECTION, SOLUTION INTRATHECAL; INTRAVASCULAR; INTRAVENOUS; ORAL at 16:54

## 2020-10-18 RX ADMIN — HYDROCORTISONE SODIUM SUCCINATE 200 MG: 100 INJECTION, POWDER, FOR SOLUTION INTRAMUSCULAR; INTRAVENOUS at 17:00

## 2020-10-18 RX ADMIN — KETOROLAC TROMETHAMINE 15 MG: 30 INJECTION, SOLUTION INTRAMUSCULAR at 15:12

## 2020-10-18 RX ADMIN — DIPHENHYDRAMINE HYDROCHLORIDE 50 MG: 50 INJECTION, SOLUTION INTRAMUSCULAR; INTRAVENOUS at 15:58

## 2020-10-18 RX ADMIN — LORAZEPAM 0.5 MG: 2 INJECTION INTRAMUSCULAR; INTRAVENOUS at 08:23

## 2020-10-18 RX ADMIN — LORAZEPAM 0.5 MG: 2 INJECTION INTRAMUSCULAR; INTRAVENOUS at 21:11

## 2020-10-18 RX ADMIN — ENOXAPARIN SODIUM 40 MG: 40 INJECTION SUBCUTANEOUS at 17:00

## 2020-10-18 RX ADMIN — DIPHENHYDRAMINE HYDROCHLORIDE 50 MG: 50 INJECTION, SOLUTION INTRAMUSCULAR; INTRAVENOUS at 04:25

## 2020-10-18 RX ADMIN — DIPHENHYDRAMINE HYDROCHLORIDE 50 MG: 50 INJECTION, SOLUTION INTRAMUSCULAR; INTRAVENOUS at 17:07

## 2020-10-18 RX ADMIN — DIPHENHYDRAMINE HYDROCHLORIDE 50 MG: 50 INJECTION, SOLUTION INTRAMUSCULAR; INTRAVENOUS at 12:26

## 2020-10-18 RX ADMIN — IODIXANOL 85 ML: 320 INJECTION, SOLUTION INTRAVASCULAR at 18:06

## 2020-10-18 RX ADMIN — ONDANSETRON 4 MG: 2 INJECTION INTRAMUSCULAR; INTRAVENOUS at 21:11

## 2020-10-18 RX ADMIN — ONDANSETRON 4 MG: 2 INJECTION INTRAMUSCULAR; INTRAVENOUS at 06:06

## 2020-10-18 RX ADMIN — KETOROLAC TROMETHAMINE 15 MG: 30 INJECTION, SOLUTION INTRAMUSCULAR at 21:56

## 2020-10-18 RX ADMIN — SODIUM CHLORIDE 125 ML/HR: 0.9 INJECTION, SOLUTION INTRAVENOUS at 12:26

## 2020-10-18 RX ADMIN — LORAZEPAM 0.5 MG: 2 INJECTION INTRAMUSCULAR; INTRAVENOUS at 14:12

## 2020-10-18 RX ADMIN — ONDANSETRON 4 MG: 2 INJECTION INTRAMUSCULAR; INTRAVENOUS at 12:26

## 2020-10-19 PROBLEM — E83.51 HYPOCALCEMIA: Status: RESOLVED | Noted: 2017-08-26 | Resolved: 2020-10-19

## 2020-10-19 PROBLEM — J96.01 ACUTE RESPIRATORY FAILURE WITH HYPOXIA (HCC): Status: RESOLVED | Noted: 2017-08-27 | Resolved: 2020-10-19

## 2020-10-19 PROBLEM — D75.839 THROMBOCYTOSIS: Status: RESOLVED | Noted: 2019-08-26 | Resolved: 2020-10-19

## 2020-10-19 PROBLEM — R00.1 BRADYCARDIA: Status: RESOLVED | Noted: 2017-08-25 | Resolved: 2020-10-19

## 2020-10-19 PROBLEM — K51.911 EXACERBATION OF ULCERATIVE COLITIS WITH RECTAL BLEEDING (HCC): Status: RESOLVED | Noted: 2017-08-25 | Resolved: 2020-10-19

## 2020-10-19 PROBLEM — D72.829 LEUKOCYTOSIS: Status: RESOLVED | Noted: 2017-09-10 | Resolved: 2020-10-19

## 2020-10-19 PROBLEM — I80.9 THROMBOPHLEBITIS: Status: RESOLVED | Noted: 2017-09-10 | Resolved: 2020-10-19

## 2020-10-19 PROBLEM — R00.2 PALPITATION: Status: RESOLVED | Noted: 2019-02-12 | Resolved: 2020-10-19

## 2020-10-19 PROBLEM — R30.0 DYSURIA: Status: RESOLVED | Noted: 2017-09-28 | Resolved: 2020-10-19

## 2020-10-19 PROBLEM — M79.89 ARM SWELLING: Status: RESOLVED | Noted: 2017-09-10 | Resolved: 2020-10-19

## 2020-10-19 PROBLEM — H53.9 VISUAL DISTURBANCE: Status: RESOLVED | Noted: 2017-11-29 | Resolved: 2020-10-19

## 2020-10-19 PROBLEM — R93.5 ABNORMAL CT OF THE ABDOMEN: Status: RESOLVED | Noted: 2017-09-10 | Resolved: 2020-10-19

## 2020-10-19 PROBLEM — J90 BILATERAL PLEURAL EFFUSION: Status: RESOLVED | Noted: 2017-08-27 | Resolved: 2020-10-19

## 2020-10-19 PROBLEM — R77.8 ELEVATED TROPONIN LEVEL: Status: RESOLVED | Noted: 2017-08-28 | Resolved: 2020-10-19

## 2020-10-19 PROBLEM — R73.9 HYPERGLYCEMIA: Status: RESOLVED | Noted: 2017-08-27 | Resolved: 2020-10-19

## 2020-10-19 PROBLEM — R79.89 ELEVATED TROPONIN LEVEL: Status: RESOLVED | Noted: 2017-08-28 | Resolved: 2020-10-19

## 2020-10-19 PROBLEM — I50.1 ACUTE CARDIOGENIC PULMONARY EDEMA (HCC): Status: RESOLVED | Noted: 2017-08-27 | Resolved: 2020-10-19

## 2020-10-19 PROBLEM — I95.9 HYPOTENSION: Status: RESOLVED | Noted: 2017-08-25 | Resolved: 2020-10-19

## 2020-10-19 PROBLEM — R79.89 LOW TSH LEVEL: Status: RESOLVED | Noted: 2017-08-26 | Resolved: 2020-10-19

## 2020-10-19 PROCEDURE — 99233 SBSQ HOSP IP/OBS HIGH 50: CPT | Performed by: INTERNAL MEDICINE

## 2020-10-19 PROCEDURE — 83993 ASSAY FOR CALPROTECTIN FECAL: CPT | Performed by: INTERNAL MEDICINE

## 2020-10-19 PROCEDURE — 99232 SBSQ HOSP IP/OBS MODERATE 35: CPT | Performed by: INTERNAL MEDICINE

## 2020-10-19 RX ORDER — HYDROMORPHONE HCL/PF 1 MG/ML
0.2 SYRINGE (ML) INJECTION ONCE
Status: COMPLETED | OUTPATIENT
Start: 2020-10-19 | End: 2020-10-19

## 2020-10-19 RX ORDER — KETOROLAC TROMETHAMINE 30 MG/ML
30 INJECTION, SOLUTION INTRAMUSCULAR; INTRAVENOUS EVERY 6 HOURS PRN
Status: DISCONTINUED | OUTPATIENT
Start: 2020-10-19 | End: 2020-10-19

## 2020-10-19 RX ORDER — FENTANYL CITRATE 50 UG/ML
50 INJECTION, SOLUTION INTRAMUSCULAR; INTRAVENOUS
Status: DISCONTINUED | OUTPATIENT
Start: 2020-10-19 | End: 2020-10-20

## 2020-10-19 RX ORDER — LOPERAMIDE HCL 1 MG/7.5ML
2 SUSPENSION ORAL 4 TIMES DAILY PRN
Status: DISCONTINUED | OUTPATIENT
Start: 2020-10-19 | End: 2020-10-28 | Stop reason: HOSPADM

## 2020-10-19 RX ORDER — ACETAMINOPHEN 325 MG/1
650 TABLET ORAL EVERY 6 HOURS PRN
Status: DISCONTINUED | OUTPATIENT
Start: 2020-10-19 | End: 2020-10-19

## 2020-10-19 RX ORDER — MAGNESIUM HYDROXIDE/ALUMINUM HYDROXICE/SIMETHICONE 120; 1200; 1200 MG/30ML; MG/30ML; MG/30ML
30 SUSPENSION ORAL EVERY 4 HOURS PRN
Status: DISCONTINUED | OUTPATIENT
Start: 2020-10-19 | End: 2020-10-28 | Stop reason: HOSPADM

## 2020-10-19 RX ADMIN — POLYETHYLENE GLYCOL 3350 17 G: 17 POWDER, FOR SOLUTION ORAL at 15:56

## 2020-10-19 RX ADMIN — DIPHENHYDRAMINE HYDROCHLORIDE 50 MG: 50 INJECTION, SOLUTION INTRAMUSCULAR; INTRAVENOUS at 08:42

## 2020-10-19 RX ADMIN — LORAZEPAM 0.5 MG: 2 INJECTION INTRAMUSCULAR; INTRAVENOUS at 00:05

## 2020-10-19 RX ADMIN — LORAZEPAM 0.5 MG: 2 INJECTION INTRAMUSCULAR; INTRAVENOUS at 04:06

## 2020-10-19 RX ADMIN — FENTANYL CITRATE 50 MCG: 50 INJECTION INTRAMUSCULAR; INTRAVENOUS at 13:20

## 2020-10-19 RX ADMIN — ENOXAPARIN SODIUM 40 MG: 40 INJECTION SUBCUTANEOUS at 15:56

## 2020-10-19 RX ADMIN — DIPHENHYDRAMINE HYDROCHLORIDE 50 MG: 50 INJECTION, SOLUTION INTRAMUSCULAR; INTRAVENOUS at 21:57

## 2020-10-19 RX ADMIN — FAMOTIDINE 20 MG: 10 INJECTION INTRAVENOUS at 00:59

## 2020-10-19 RX ADMIN — FAMOTIDINE 20 MG: 10 INJECTION INTRAVENOUS at 08:42

## 2020-10-19 RX ADMIN — SODIUM CHLORIDE 125 ML/HR: 0.9 INJECTION, SOLUTION INTRAVENOUS at 14:38

## 2020-10-19 RX ADMIN — SODIUM CHLORIDE 125 ML/HR: 0.9 INJECTION, SOLUTION INTRAVENOUS at 23:42

## 2020-10-19 RX ADMIN — METHYLPREDNISOLONE SODIUM SUCCINATE 20 MG: 40 INJECTION, POWDER, FOR SOLUTION INTRAMUSCULAR; INTRAVENOUS at 13:20

## 2020-10-19 RX ADMIN — ONDANSETRON 4 MG: 2 INJECTION INTRAMUSCULAR; INTRAVENOUS at 04:01

## 2020-10-19 RX ADMIN — DIPHENHYDRAMINE HYDROCHLORIDE 50 MG: 50 INJECTION, SOLUTION INTRAMUSCULAR; INTRAVENOUS at 00:17

## 2020-10-19 RX ADMIN — HYDROMORPHONE HYDROCHLORIDE 0.2 MG: 1 INJECTION, SOLUTION INTRAMUSCULAR; INTRAVENOUS; SUBCUTANEOUS at 04:53

## 2020-10-19 RX ADMIN — METHYLPREDNISOLONE SODIUM SUCCINATE 20 MG: 40 INJECTION, POWDER, FOR SOLUTION INTRAMUSCULAR; INTRAVENOUS at 21:46

## 2020-10-19 RX ADMIN — FAMOTIDINE 20 MG: 10 INJECTION INTRAVENOUS at 21:46

## 2020-10-19 RX ADMIN — METHYLPREDNISOLONE SODIUM SUCCINATE 20 MG: 40 INJECTION, POWDER, FOR SOLUTION INTRAMUSCULAR; INTRAVENOUS at 05:52

## 2020-10-19 RX ADMIN — DIPHENHYDRAMINE HYDROCHLORIDE 50 MG: 50 INJECTION, SOLUTION INTRAMUSCULAR; INTRAVENOUS at 15:56

## 2020-10-19 RX ADMIN — POLYETHYLENE GLYCOL 3350 17 G: 17 POWDER, FOR SOLUTION ORAL at 21:45

## 2020-10-19 RX ADMIN — ONDANSETRON 4 MG: 2 INJECTION INTRAMUSCULAR; INTRAVENOUS at 21:57

## 2020-10-19 RX ADMIN — LORAZEPAM 0.5 MG: 2 INJECTION INTRAMUSCULAR; INTRAVENOUS at 09:07

## 2020-10-19 RX ADMIN — LORAZEPAM 0.5 MG: 2 INJECTION INTRAMUSCULAR; INTRAVENOUS at 12:34

## 2020-10-20 PROBLEM — R06.02 SHORTNESS OF BREATH: Status: RESOLVED | Noted: 2020-10-17 | Resolved: 2020-10-20

## 2020-10-20 LAB
ALBUMIN SERPL BCP-MCNC: 3.2 G/DL (ref 3.5–5)
ALP SERPL-CCNC: 42 U/L (ref 46–116)
ALT SERPL W P-5'-P-CCNC: 28 U/L (ref 12–78)
ANION GAP SERPL CALCULATED.3IONS-SCNC: 3 MMOL/L (ref 4–13)
AST SERPL W P-5'-P-CCNC: 20 U/L (ref 5–45)
BILIRUB DIRECT SERPL-MCNC: 0.12 MG/DL (ref 0–0.2)
BILIRUB SERPL-MCNC: 0.34 MG/DL (ref 0.2–1)
BUN SERPL-MCNC: 4 MG/DL (ref 5–25)
CALCIUM SERPL-MCNC: 8 MG/DL (ref 8.3–10.1)
CHLORIDE SERPL-SCNC: 113 MMOL/L (ref 100–108)
CO2 SERPL-SCNC: 27 MMOL/L (ref 21–32)
CREAT SERPL-MCNC: 0.61 MG/DL (ref 0.6–1.3)
GFR SERPL CREATININE-BSD FRML MDRD: 108 ML/MIN/1.73SQ M
GLUCOSE SERPL-MCNC: 121 MG/DL (ref 65–140)
LIPASE SERPL-CCNC: 63 U/L (ref 73–393)
MAGNESIUM SERPL-MCNC: 2.6 MG/DL (ref 1.6–2.6)
POTASSIUM SERPL-SCNC: 3.3 MMOL/L (ref 3.5–5.3)
PROT SERPL-MCNC: 6.9 G/DL (ref 6.4–8.2)
SODIUM SERPL-SCNC: 143 MMOL/L (ref 136–145)

## 2020-10-20 PROCEDURE — 99232 SBSQ HOSP IP/OBS MODERATE 35: CPT | Performed by: INTERNAL MEDICINE

## 2020-10-20 PROCEDURE — 99232 SBSQ HOSP IP/OBS MODERATE 35: CPT | Performed by: FAMILY MEDICINE

## 2020-10-20 PROCEDURE — 83735 ASSAY OF MAGNESIUM: CPT | Performed by: FAMILY MEDICINE

## 2020-10-20 PROCEDURE — 83690 ASSAY OF LIPASE: CPT | Performed by: STUDENT IN AN ORGANIZED HEALTH CARE EDUCATION/TRAINING PROGRAM

## 2020-10-20 PROCEDURE — 80048 BASIC METABOLIC PNL TOTAL CA: CPT | Performed by: INTERNAL MEDICINE

## 2020-10-20 PROCEDURE — 80076 HEPATIC FUNCTION PANEL: CPT | Performed by: STUDENT IN AN ORGANIZED HEALTH CARE EDUCATION/TRAINING PROGRAM

## 2020-10-20 RX ORDER — HYDROMORPHONE HCL/PF 1 MG/ML
0.5 SYRINGE (ML) INJECTION EVERY 4 HOURS PRN
Status: DISCONTINUED | OUTPATIENT
Start: 2020-10-20 | End: 2020-10-20

## 2020-10-20 RX ORDER — HYDROMORPHONE HCL/PF 1 MG/ML
1 SYRINGE (ML) INJECTION EVERY 4 HOURS PRN
Status: DISCONTINUED | OUTPATIENT
Start: 2020-10-20 | End: 2020-10-27

## 2020-10-20 RX ORDER — FENTANYL CITRATE 50 UG/ML
50 INJECTION, SOLUTION INTRAMUSCULAR; INTRAVENOUS
Status: DISCONTINUED | OUTPATIENT
Start: 2020-10-20 | End: 2020-10-27

## 2020-10-20 RX ORDER — POTASSIUM CHLORIDE 14.9 MG/ML
20 INJECTION INTRAVENOUS ONCE
Status: COMPLETED | OUTPATIENT
Start: 2020-10-20 | End: 2020-10-20

## 2020-10-20 RX ORDER — HYDROMORPHONE HCL/PF 1 MG/ML
1 SYRINGE (ML) INJECTION
Status: DISCONTINUED | OUTPATIENT
Start: 2020-10-20 | End: 2020-10-20

## 2020-10-20 RX ORDER — SODIUM CHLORIDE, SODIUM LACTATE, POTASSIUM CHLORIDE, CALCIUM CHLORIDE 600; 310; 30; 20 MG/100ML; MG/100ML; MG/100ML; MG/100ML
50 INJECTION, SOLUTION INTRAVENOUS CONTINUOUS
Status: DISCONTINUED | OUTPATIENT
Start: 2020-10-20 | End: 2020-10-22

## 2020-10-20 RX ORDER — HYDROMORPHONE HCL 110MG/55ML
2 PATIENT CONTROLLED ANALGESIA SYRINGE INTRAVENOUS
Status: DISCONTINUED | OUTPATIENT
Start: 2020-10-20 | End: 2020-10-27

## 2020-10-20 RX ORDER — DIPHENHYDRAMINE HYDROCHLORIDE 50 MG/ML
50 INJECTION INTRAMUSCULAR; INTRAVENOUS EVERY 4 HOURS PRN
Status: DISCONTINUED | OUTPATIENT
Start: 2020-10-20 | End: 2020-10-28

## 2020-10-20 RX ADMIN — ONDANSETRON 4 MG: 2 INJECTION INTRAMUSCULAR; INTRAVENOUS at 05:41

## 2020-10-20 RX ADMIN — SODIUM CHLORIDE, SODIUM LACTATE, POTASSIUM CHLORIDE, AND CALCIUM CHLORIDE 150 ML/HR: .6; .31; .03; .02 INJECTION, SOLUTION INTRAVENOUS at 10:25

## 2020-10-20 RX ADMIN — METHYLPREDNISOLONE SODIUM SUCCINATE 20 MG: 40 INJECTION, POWDER, FOR SOLUTION INTRAMUSCULAR; INTRAVENOUS at 13:59

## 2020-10-20 RX ADMIN — POLYETHYLENE GLYCOL 3350, SODIUM SULFATE ANHYDROUS, SODIUM BICARBONATE, SODIUM CHLORIDE, POTASSIUM CHLORIDE 3000 ML: 236; 22.74; 6.74; 5.86; 2.97 POWDER, FOR SOLUTION ORAL at 16:32

## 2020-10-20 RX ADMIN — HYDROMORPHONE HYDROCHLORIDE 2 MG: 2 INJECTION, SOLUTION INTRAMUSCULAR; INTRAVENOUS; SUBCUTANEOUS at 19:22

## 2020-10-20 RX ADMIN — FENTANYL CITRATE 50 MCG: 50 INJECTION INTRAMUSCULAR; INTRAVENOUS at 15:50

## 2020-10-20 RX ADMIN — FAMOTIDINE 40 MG: 10 INJECTION INTRAVENOUS at 13:57

## 2020-10-20 RX ADMIN — POTASSIUM CHLORIDE 20 MEQ: 14.9 INJECTION, SOLUTION INTRAVENOUS at 19:55

## 2020-10-20 RX ADMIN — METHYLPREDNISOLONE SODIUM SUCCINATE 20 MG: 40 INJECTION, POWDER, FOR SOLUTION INTRAMUSCULAR; INTRAVENOUS at 21:24

## 2020-10-20 RX ADMIN — HYDROMORPHONE HYDROCHLORIDE 1 MG: 1 INJECTION, SOLUTION INTRAMUSCULAR; INTRAVENOUS; SUBCUTANEOUS at 14:04

## 2020-10-20 RX ADMIN — HYDROMORPHONE HYDROCHLORIDE 0.5 MG: 1 INJECTION, SOLUTION INTRAMUSCULAR; INTRAVENOUS; SUBCUTANEOUS at 10:30

## 2020-10-20 RX ADMIN — SODIUM CHLORIDE, SODIUM LACTATE, POTASSIUM CHLORIDE, AND CALCIUM CHLORIDE 150 ML/HR: .6; .31; .03; .02 INJECTION, SOLUTION INTRAVENOUS at 21:35

## 2020-10-20 RX ADMIN — DIPHENHYDRAMINE HYDROCHLORIDE 50 MG: 50 INJECTION, SOLUTION INTRAMUSCULAR; INTRAVENOUS at 19:24

## 2020-10-20 RX ADMIN — ONDANSETRON 4 MG: 2 INJECTION INTRAMUSCULAR; INTRAVENOUS at 13:06

## 2020-10-20 RX ADMIN — POLYETHYLENE GLYCOL 3350 17 G: 17 POWDER, FOR SOLUTION ORAL at 08:13

## 2020-10-20 RX ADMIN — SODIUM CHLORIDE, SODIUM LACTATE, POTASSIUM CHLORIDE, AND CALCIUM CHLORIDE 150 ML/HR: .6; .31; .03; .02 INJECTION, SOLUTION INTRAVENOUS at 18:30

## 2020-10-20 RX ADMIN — ENOXAPARIN SODIUM 40 MG: 40 INJECTION SUBCUTANEOUS at 16:31

## 2020-10-20 RX ADMIN — DIPHENHYDRAMINE HYDROCHLORIDE 50 MG: 50 INJECTION, SOLUTION INTRAMUSCULAR; INTRAVENOUS at 13:01

## 2020-10-20 RX ADMIN — FAMOTIDINE 40 MG: 10 INJECTION INTRAVENOUS at 21:23

## 2020-10-20 RX ADMIN — LORAZEPAM 0.5 MG: 2 INJECTION INTRAMUSCULAR; INTRAVENOUS at 06:35

## 2020-10-20 RX ADMIN — SODIUM CHLORIDE 125 ML/HR: 0.9 INJECTION, SOLUTION INTRAVENOUS at 08:13

## 2020-10-20 RX ADMIN — METHYLPREDNISOLONE SODIUM SUCCINATE 20 MG: 40 INJECTION, POWDER, FOR SOLUTION INTRAMUSCULAR; INTRAVENOUS at 05:38

## 2020-10-20 RX ADMIN — DIPHENHYDRAMINE HYDROCHLORIDE 50 MG: 50 INJECTION, SOLUTION INTRAMUSCULAR; INTRAVENOUS at 05:40

## 2020-10-21 ENCOUNTER — ANESTHESIA (INPATIENT)
Dept: GASTROENTEROLOGY | Facility: HOSPITAL | Age: 45
DRG: 815 | End: 2020-10-21
Payer: COMMERCIAL

## 2020-10-21 ENCOUNTER — APPOINTMENT (INPATIENT)
Dept: GASTROENTEROLOGY | Facility: HOSPITAL | Age: 45
DRG: 815 | End: 2020-10-21
Payer: COMMERCIAL

## 2020-10-21 ENCOUNTER — ANESTHESIA EVENT (INPATIENT)
Dept: GASTROENTEROLOGY | Facility: HOSPITAL | Age: 45
DRG: 815 | End: 2020-10-21
Payer: COMMERCIAL

## 2020-10-21 VITALS — HEART RATE: 54 BPM

## 2020-10-21 LAB — TRYPTASE SERPL-MCNC: 10.3 UG/L (ref 2.2–13.2)

## 2020-10-21 PROCEDURE — 0DB58ZX EXCISION OF ESOPHAGUS, VIA NATURAL OR ARTIFICIAL OPENING ENDOSCOPIC, DIAGNOSTIC: ICD-10-PCS | Performed by: INTERNAL MEDICINE

## 2020-10-21 PROCEDURE — 0DBN8ZX EXCISION OF SIGMOID COLON, VIA NATURAL OR ARTIFICIAL OPENING ENDOSCOPIC, DIAGNOSTIC: ICD-10-PCS | Performed by: INTERNAL MEDICINE

## 2020-10-21 PROCEDURE — 45380 COLONOSCOPY AND BIOPSY: CPT | Performed by: INTERNAL MEDICINE

## 2020-10-21 PROCEDURE — 88305 TISSUE EXAM BY PATHOLOGIST: CPT | Performed by: PATHOLOGY

## 2020-10-21 PROCEDURE — 0DBP8ZX EXCISION OF RECTUM, VIA NATURAL OR ARTIFICIAL OPENING ENDOSCOPIC, DIAGNOSTIC: ICD-10-PCS | Performed by: INTERNAL MEDICINE

## 2020-10-21 PROCEDURE — NC001 PR NO CHARGE: Performed by: INTERNAL MEDICINE

## 2020-10-21 PROCEDURE — 0DB68ZX EXCISION OF STOMACH, VIA NATURAL OR ARTIFICIAL OPENING ENDOSCOPIC, DIAGNOSTIC: ICD-10-PCS | Performed by: INTERNAL MEDICINE

## 2020-10-21 PROCEDURE — 0DBK8ZX EXCISION OF ASCENDING COLON, VIA NATURAL OR ARTIFICIAL OPENING ENDOSCOPIC, DIAGNOSTIC: ICD-10-PCS | Performed by: INTERNAL MEDICINE

## 2020-10-21 PROCEDURE — 0DBB8ZX EXCISION OF ILEUM, VIA NATURAL OR ARTIFICIAL OPENING ENDOSCOPIC, DIAGNOSTIC: ICD-10-PCS | Performed by: INTERNAL MEDICINE

## 2020-10-21 PROCEDURE — 0DBL8ZX EXCISION OF TRANSVERSE COLON, VIA NATURAL OR ARTIFICIAL OPENING ENDOSCOPIC, DIAGNOSTIC: ICD-10-PCS | Performed by: INTERNAL MEDICINE

## 2020-10-21 PROCEDURE — 99232 SBSQ HOSP IP/OBS MODERATE 35: CPT | Performed by: FAMILY MEDICINE

## 2020-10-21 PROCEDURE — 0DBH8ZX EXCISION OF CECUM, VIA NATURAL OR ARTIFICIAL OPENING ENDOSCOPIC, DIAGNOSTIC: ICD-10-PCS | Performed by: INTERNAL MEDICINE

## 2020-10-21 PROCEDURE — 43239 EGD BIOPSY SINGLE/MULTIPLE: CPT | Performed by: INTERNAL MEDICINE

## 2020-10-21 PROCEDURE — 0DB98ZX EXCISION OF DUODENUM, VIA NATURAL OR ARTIFICIAL OPENING ENDOSCOPIC, DIAGNOSTIC: ICD-10-PCS | Performed by: INTERNAL MEDICINE

## 2020-10-21 PROCEDURE — 0DBM8ZX EXCISION OF DESCENDING COLON, VIA NATURAL OR ARTIFICIAL OPENING ENDOSCOPIC, DIAGNOSTIC: ICD-10-PCS | Performed by: INTERNAL MEDICINE

## 2020-10-21 RX ORDER — DIPHENHYDRAMINE HYDROCHLORIDE 50 MG/ML
50 INJECTION INTRAMUSCULAR; INTRAVENOUS ONCE
Status: COMPLETED | OUTPATIENT
Start: 2020-10-21 | End: 2020-10-21

## 2020-10-21 RX ORDER — METHYLPREDNISOLONE SODIUM SUCCINATE 125 MG/2ML
INJECTION, POWDER, LYOPHILIZED, FOR SOLUTION INTRAMUSCULAR; INTRAVENOUS AS NEEDED
Status: DISCONTINUED | OUTPATIENT
Start: 2020-10-21 | End: 2020-10-21

## 2020-10-21 RX ORDER — PROPOFOL 10 MG/ML
INJECTION, EMULSION INTRAVENOUS AS NEEDED
Status: DISCONTINUED | OUTPATIENT
Start: 2020-10-21 | End: 2020-10-21

## 2020-10-21 RX ORDER — MONTELUKAST SODIUM 10 MG/1
10 TABLET ORAL ONCE
Status: COMPLETED | OUTPATIENT
Start: 2020-10-21 | End: 2020-10-21

## 2020-10-21 RX ORDER — DIPHENHYDRAMINE HYDROCHLORIDE 50 MG/ML
25 INJECTION INTRAMUSCULAR; INTRAVENOUS ONCE
Status: COMPLETED | OUTPATIENT
Start: 2020-10-21 | End: 2020-10-21

## 2020-10-21 RX ORDER — DIPHENHYDRAMINE HCL 25 MG
25 TABLET ORAL ONCE
Status: DISCONTINUED | OUTPATIENT
Start: 2020-10-21 | End: 2020-10-21

## 2020-10-21 RX ORDER — METHYLPREDNISOLONE SODIUM SUCCINATE 125 MG/2ML
50 INJECTION, POWDER, LYOPHILIZED, FOR SOLUTION INTRAMUSCULAR; INTRAVENOUS ONCE
Status: COMPLETED | OUTPATIENT
Start: 2020-10-21 | End: 2020-10-21

## 2020-10-21 RX ADMIN — PROPOFOL 30 MG: 10 INJECTION, EMULSION INTRAVENOUS at 15:24

## 2020-10-21 RX ADMIN — PROPOFOL 30 MG: 10 INJECTION, EMULSION INTRAVENOUS at 15:27

## 2020-10-21 RX ADMIN — SODIUM CHLORIDE, SODIUM LACTATE, POTASSIUM CHLORIDE, AND CALCIUM CHLORIDE 150 ML/HR: .6; .31; .03; .02 INJECTION, SOLUTION INTRAVENOUS at 12:03

## 2020-10-21 RX ADMIN — METHYLPREDNISOLONE SODIUM SUCCINATE 20 MG: 40 INJECTION, POWDER, FOR SOLUTION INTRAMUSCULAR; INTRAVENOUS at 20:32

## 2020-10-21 RX ADMIN — METHYLPREDNISOLONE SODIUM SUCCINATE 50 MG: 125 INJECTION, POWDER, FOR SOLUTION INTRAMUSCULAR; INTRAVENOUS at 12:11

## 2020-10-21 RX ADMIN — MONTELUKAST 10 MG: 10 TABLET, FILM COATED ORAL at 12:11

## 2020-10-21 RX ADMIN — DIPHENHYDRAMINE HYDROCHLORIDE 50 MG: 50 INJECTION, SOLUTION INTRAMUSCULAR; INTRAVENOUS at 20:28

## 2020-10-21 RX ADMIN — DIPHENHYDRAMINE HYDROCHLORIDE 50 MG: 50 INJECTION, SOLUTION INTRAMUSCULAR; INTRAVENOUS at 12:11

## 2020-10-21 RX ADMIN — LORAZEPAM 0.5 MG: 2 INJECTION INTRAMUSCULAR; INTRAVENOUS at 00:26

## 2020-10-21 RX ADMIN — HYDROMORPHONE HYDROCHLORIDE 2 MG: 2 INJECTION, SOLUTION INTRAMUSCULAR; INTRAVENOUS; SUBCUTANEOUS at 06:39

## 2020-10-21 RX ADMIN — FAMOTIDINE 40 MG: 10 INJECTION INTRAVENOUS at 08:40

## 2020-10-21 RX ADMIN — DIPHENHYDRAMINE HYDROCHLORIDE 25 MG: 50 INJECTION, SOLUTION INTRAMUSCULAR; INTRAVENOUS at 02:09

## 2020-10-21 RX ADMIN — METHYLPREDNISOLONE SODIUM SUCCINATE 20 MG: 40 INJECTION, POWDER, FOR SOLUTION INTRAMUSCULAR; INTRAVENOUS at 05:27

## 2020-10-21 RX ADMIN — PROPOFOL 30 MG: 10 INJECTION, EMULSION INTRAVENOUS at 15:09

## 2020-10-21 RX ADMIN — ONDANSETRON 4 MG: 2 INJECTION INTRAMUSCULAR; INTRAVENOUS at 20:50

## 2020-10-21 RX ADMIN — DIPHENHYDRAMINE HYDROCHLORIDE 50 MG: 50 INJECTION, SOLUTION INTRAMUSCULAR; INTRAVENOUS at 00:26

## 2020-10-21 RX ADMIN — DIPHENHYDRAMINE HYDROCHLORIDE 50 MG: 50 INJECTION, SOLUTION INTRAMUSCULAR; INTRAVENOUS at 10:23

## 2020-10-21 RX ADMIN — DIPHENHYDRAMINE HYDROCHLORIDE 50 MG: 50 INJECTION, SOLUTION INTRAMUSCULAR; INTRAVENOUS at 14:58

## 2020-10-21 RX ADMIN — ONDANSETRON 4 MG: 2 INJECTION INTRAMUSCULAR; INTRAVENOUS at 00:26

## 2020-10-21 RX ADMIN — ENOXAPARIN SODIUM 40 MG: 40 INJECTION SUBCUTANEOUS at 20:30

## 2020-10-21 RX ADMIN — PROPOFOL 30 MG: 10 INJECTION, EMULSION INTRAVENOUS at 15:07

## 2020-10-21 RX ADMIN — LORAZEPAM 0.5 MG: 2 INJECTION INTRAMUSCULAR; INTRAVENOUS at 20:28

## 2020-10-21 RX ADMIN — SODIUM CHLORIDE, SODIUM LACTATE, POTASSIUM CHLORIDE, AND CALCIUM CHLORIDE 150 ML/HR: .6; .31; .03; .02 INJECTION, SOLUTION INTRAVENOUS at 04:41

## 2020-10-21 RX ADMIN — FAMOTIDINE 40 MG: 10 INJECTION INTRAVENOUS at 22:53

## 2020-10-21 RX ADMIN — PROPOFOL 30 MG: 10 INJECTION, EMULSION INTRAVENOUS at 15:12

## 2020-10-21 RX ADMIN — METHYLPREDNISOLONE SODIUM SUCCINATE 20 MG: 125 INJECTION, POWDER, FOR SOLUTION INTRAMUSCULAR; INTRAVENOUS at 14:58

## 2020-10-21 RX ADMIN — FAMOTIDINE 20 MG: 10 INJECTION, SOLUTION INTRAVENOUS at 12:12

## 2020-10-21 RX ADMIN — SODIUM CHLORIDE, SODIUM LACTATE, POTASSIUM CHLORIDE, AND CALCIUM CHLORIDE 150 ML/HR: .6; .31; .03; .02 INJECTION, SOLUTION INTRAVENOUS at 00:01

## 2020-10-21 RX ADMIN — PROPOFOL 30 MG: 10 INJECTION, EMULSION INTRAVENOUS at 15:19

## 2020-10-21 RX ADMIN — PROPOFOL 100 MG: 10 INJECTION, EMULSION INTRAVENOUS at 15:04

## 2020-10-21 RX ADMIN — DIPHENHYDRAMINE HYDROCHLORIDE 50 MG: 50 INJECTION, SOLUTION INTRAMUSCULAR; INTRAVENOUS at 05:26

## 2020-10-21 RX ADMIN — PROPOFOL 30 MG: 10 INJECTION, EMULSION INTRAVENOUS at 15:29

## 2020-10-21 RX ADMIN — HYDROMORPHONE HYDROCHLORIDE 2 MG: 2 INJECTION, SOLUTION INTRAMUSCULAR; INTRAVENOUS; SUBCUTANEOUS at 20:31

## 2020-10-21 RX ADMIN — PROPOFOL 30 MG: 10 INJECTION, EMULSION INTRAVENOUS at 15:15

## 2020-10-21 RX ADMIN — PROPOFOL 30 MG: 10 INJECTION, EMULSION INTRAVENOUS at 15:17

## 2020-10-22 LAB
ALBUMIN SERPL BCP-MCNC: 3 G/DL (ref 3.5–5)
ALP SERPL-CCNC: 36 U/L (ref 46–116)
ALT SERPL W P-5'-P-CCNC: 23 U/L (ref 12–78)
ANION GAP SERPL CALCULATED.3IONS-SCNC: 2 MMOL/L (ref 4–13)
AST SERPL W P-5'-P-CCNC: 12 U/L (ref 5–45)
BILIRUB SERPL-MCNC: 0.28 MG/DL (ref 0.2–1)
BUN SERPL-MCNC: 6 MG/DL (ref 5–25)
CALCIUM ALBUM COR SERPL-MCNC: 9.1 MG/DL (ref 8.3–10.1)
CALCIUM SERPL-MCNC: 8.3 MG/DL (ref 8.3–10.1)
CHLORIDE SERPL-SCNC: 106 MMOL/L (ref 100–108)
CO2 SERPL-SCNC: 33 MMOL/L (ref 21–32)
CREAT SERPL-MCNC: 0.59 MG/DL (ref 0.6–1.3)
ERYTHROCYTE [DISTWIDTH] IN BLOOD BY AUTOMATED COUNT: 13.9 % (ref 11.6–15.1)
GFR SERPL CREATININE-BSD FRML MDRD: 109 ML/MIN/1.73SQ M
GLUCOSE SERPL-MCNC: 105 MG/DL (ref 65–140)
HCT VFR BLD AUTO: 36.8 % (ref 34.8–46.1)
HGB BLD-MCNC: 12.5 G/DL (ref 11.5–15.4)
MAGNESIUM SERPL-MCNC: 2.6 MG/DL (ref 1.6–2.6)
MCH RBC QN AUTO: 32.2 PG (ref 26.8–34.3)
MCHC RBC AUTO-ENTMCNC: 34 G/DL (ref 31.4–37.4)
MCV RBC AUTO: 95 FL (ref 82–98)
PHOSPHATE SERPL-MCNC: 3.4 MG/DL (ref 2.7–4.5)
PLATELET # BLD AUTO: 328 THOUSANDS/UL (ref 149–390)
PMV BLD AUTO: 10.7 FL (ref 8.9–12.7)
POTASSIUM SERPL-SCNC: 3.2 MMOL/L (ref 3.5–5.3)
PROT SERPL-MCNC: 6.3 G/DL (ref 6.4–8.2)
RBC # BLD AUTO: 3.88 MILLION/UL (ref 3.81–5.12)
SODIUM SERPL-SCNC: 141 MMOL/L (ref 136–145)
WBC # BLD AUTO: 12.66 THOUSAND/UL (ref 4.31–10.16)

## 2020-10-22 PROCEDURE — 83735 ASSAY OF MAGNESIUM: CPT | Performed by: FAMILY MEDICINE

## 2020-10-22 PROCEDURE — 99232 SBSQ HOSP IP/OBS MODERATE 35: CPT | Performed by: FAMILY MEDICINE

## 2020-10-22 PROCEDURE — 80053 COMPREHEN METABOLIC PANEL: CPT | Performed by: FAMILY MEDICINE

## 2020-10-22 PROCEDURE — 84100 ASSAY OF PHOSPHORUS: CPT | Performed by: FAMILY MEDICINE

## 2020-10-22 PROCEDURE — 85027 COMPLETE CBC AUTOMATED: CPT | Performed by: FAMILY MEDICINE

## 2020-10-22 PROCEDURE — 99232 SBSQ HOSP IP/OBS MODERATE 35: CPT | Performed by: INTERNAL MEDICINE

## 2020-10-22 RX ORDER — DIPHENHYDRAMINE HYDROCHLORIDE 50 MG/ML
50 INJECTION INTRAMUSCULAR; INTRAVENOUS ONCE
Status: COMPLETED | OUTPATIENT
Start: 2020-10-22 | End: 2020-10-22

## 2020-10-22 RX ORDER — POTASSIUM CHLORIDE 14.9 MG/ML
20 INJECTION INTRAVENOUS ONCE
Status: COMPLETED | OUTPATIENT
Start: 2020-10-22 | End: 2020-10-23

## 2020-10-22 RX ORDER — SODIUM CHLORIDE, SODIUM LACTATE, POTASSIUM CHLORIDE, CALCIUM CHLORIDE 600; 310; 30; 20 MG/100ML; MG/100ML; MG/100ML; MG/100ML
150 INJECTION, SOLUTION INTRAVENOUS CONTINUOUS
Status: DISCONTINUED | OUTPATIENT
Start: 2020-10-22 | End: 2020-10-28

## 2020-10-22 RX ADMIN — HYDROMORPHONE HYDROCHLORIDE 2 MG: 2 INJECTION, SOLUTION INTRAMUSCULAR; INTRAVENOUS; SUBCUTANEOUS at 13:43

## 2020-10-22 RX ADMIN — LORAZEPAM 0.5 MG: 2 INJECTION INTRAMUSCULAR; INTRAVENOUS at 08:55

## 2020-10-22 RX ADMIN — ONDANSETRON 4 MG: 2 INJECTION INTRAMUSCULAR; INTRAVENOUS at 23:30

## 2020-10-22 RX ADMIN — METHYLPREDNISOLONE SODIUM SUCCINATE 20 MG: 40 INJECTION, POWDER, FOR SOLUTION INTRAMUSCULAR; INTRAVENOUS at 06:02

## 2020-10-22 RX ADMIN — FAMOTIDINE 40 MG: 10 INJECTION INTRAVENOUS at 22:05

## 2020-10-22 RX ADMIN — DIPHENHYDRAMINE HYDROCHLORIDE 50 MG: 50 INJECTION, SOLUTION INTRAMUSCULAR; INTRAVENOUS at 12:17

## 2020-10-22 RX ADMIN — SODIUM CHLORIDE, SODIUM LACTATE, POTASSIUM CHLORIDE, AND CALCIUM CHLORIDE 50 ML/HR: .6; .31; .03; .02 INJECTION, SOLUTION INTRAVENOUS at 12:18

## 2020-10-22 RX ADMIN — ENOXAPARIN SODIUM 40 MG: 40 INJECTION SUBCUTANEOUS at 15:39

## 2020-10-22 RX ADMIN — DIPHENHYDRAMINE HYDROCHLORIDE 50 MG: 50 INJECTION, SOLUTION INTRAMUSCULAR; INTRAVENOUS at 15:36

## 2020-10-22 RX ADMIN — DIPHENHYDRAMINE HYDROCHLORIDE 50 MG: 50 INJECTION, SOLUTION INTRAMUSCULAR; INTRAVENOUS at 17:58

## 2020-10-22 RX ADMIN — HYDROMORPHONE HYDROCHLORIDE 1 MG: 1 INJECTION, SOLUTION INTRAMUSCULAR; INTRAVENOUS; SUBCUTANEOUS at 06:09

## 2020-10-22 RX ADMIN — HYDROMORPHONE HYDROCHLORIDE 1 MG: 1 INJECTION, SOLUTION INTRAMUSCULAR; INTRAVENOUS; SUBCUTANEOUS at 17:57

## 2020-10-22 RX ADMIN — METHYLPREDNISOLONE SODIUM SUCCINATE 20 MG: 40 INJECTION, POWDER, FOR SOLUTION INTRAMUSCULAR; INTRAVENOUS at 22:04

## 2020-10-22 RX ADMIN — FAMOTIDINE 40 MG: 10 INJECTION INTRAVENOUS at 08:57

## 2020-10-22 RX ADMIN — METHYLPREDNISOLONE SODIUM SUCCINATE 20 MG: 40 INJECTION, POWDER, FOR SOLUTION INTRAMUSCULAR; INTRAVENOUS at 13:42

## 2020-10-22 RX ADMIN — DIPHENHYDRAMINE HYDROCHLORIDE 50 MG: 50 INJECTION, SOLUTION INTRAMUSCULAR; INTRAVENOUS at 22:05

## 2020-10-22 RX ADMIN — DIPHENHYDRAMINE HYDROCHLORIDE 50 MG: 50 INJECTION, SOLUTION INTRAMUSCULAR; INTRAVENOUS at 23:30

## 2020-10-22 RX ADMIN — ONDANSETRON 4 MG: 2 INJECTION INTRAMUSCULAR; INTRAVENOUS at 15:41

## 2020-10-22 RX ADMIN — LORAZEPAM 0.5 MG: 2 INJECTION INTRAMUSCULAR; INTRAVENOUS at 23:23

## 2020-10-22 RX ADMIN — DIPHENHYDRAMINE HYDROCHLORIDE 50 MG: 50 INJECTION, SOLUTION INTRAMUSCULAR; INTRAVENOUS at 00:53

## 2020-10-22 RX ADMIN — DIPHENHYDRAMINE HYDROCHLORIDE 50 MG: 50 INJECTION, SOLUTION INTRAMUSCULAR; INTRAVENOUS at 06:09

## 2020-10-22 RX ADMIN — HYDROMORPHONE HYDROCHLORIDE 2 MG: 2 INJECTION, SOLUTION INTRAMUSCULAR; INTRAVENOUS; SUBCUTANEOUS at 23:22

## 2020-10-22 RX ADMIN — SODIUM CHLORIDE, SODIUM LACTATE, POTASSIUM CHLORIDE, AND CALCIUM CHLORIDE 75 ML/HR: .6; .31; .03; .02 INJECTION, SOLUTION INTRAVENOUS at 23:41

## 2020-10-22 RX ADMIN — POTASSIUM CHLORIDE 75 ML/HR: 2 INJECTION, SOLUTION, CONCENTRATE INTRAVENOUS at 17:35

## 2020-10-23 LAB
ALBUMIN SERPL BCP-MCNC: 2.8 G/DL (ref 3.5–5)
ALP SERPL-CCNC: 35 U/L (ref 46–116)
ALT SERPL W P-5'-P-CCNC: 27 U/L (ref 12–78)
ANION GAP SERPL CALCULATED.3IONS-SCNC: 7 MMOL/L (ref 4–13)
AST SERPL W P-5'-P-CCNC: 13 U/L (ref 5–45)
BASOPHILS # BLD AUTO: 0.01 THOUSANDS/ΜL (ref 0–0.1)
BASOPHILS NFR BLD AUTO: 0 % (ref 0–1)
BILIRUB SERPL-MCNC: 0.28 MG/DL (ref 0.2–1)
BUN SERPL-MCNC: 5 MG/DL (ref 5–25)
CALCIUM ALBUM COR SERPL-MCNC: 8.9 MG/DL (ref 8.3–10.1)
CALCIUM SERPL-MCNC: 7.9 MG/DL (ref 8.3–10.1)
CHLORIDE SERPL-SCNC: 105 MMOL/L (ref 100–108)
CO2 SERPL-SCNC: 27 MMOL/L (ref 21–32)
CREAT SERPL-MCNC: 0.62 MG/DL (ref 0.6–1.3)
EOSINOPHIL # BLD AUTO: 0 THOUSAND/ΜL (ref 0–0.61)
EOSINOPHIL NFR BLD AUTO: 0 % (ref 0–6)
ERYTHROCYTE [DISTWIDTH] IN BLOOD BY AUTOMATED COUNT: 13.8 % (ref 11.6–15.1)
GFR SERPL CREATININE-BSD FRML MDRD: 108 ML/MIN/1.73SQ M
GLUCOSE SERPL-MCNC: 98 MG/DL (ref 65–140)
HCT VFR BLD AUTO: 38.4 % (ref 34.8–46.1)
HGB BLD-MCNC: 12.6 G/DL (ref 11.5–15.4)
IMM GRANULOCYTES # BLD AUTO: 0.06 THOUSAND/UL (ref 0–0.2)
IMM GRANULOCYTES NFR BLD AUTO: 1 % (ref 0–2)
LYMPHOCYTES # BLD AUTO: 1.49 THOUSANDS/ΜL (ref 0.6–4.47)
LYMPHOCYTES NFR BLD AUTO: 15 % (ref 14–44)
MCH RBC QN AUTO: 32.2 PG (ref 26.8–34.3)
MCHC RBC AUTO-ENTMCNC: 32.8 G/DL (ref 31.4–37.4)
MCV RBC AUTO: 98 FL (ref 82–98)
MONOCYTES # BLD AUTO: 0.79 THOUSAND/ΜL (ref 0.17–1.22)
MONOCYTES NFR BLD AUTO: 8 % (ref 4–12)
NEUTROPHILS # BLD AUTO: 7.83 THOUSANDS/ΜL (ref 1.85–7.62)
NEUTS SEG NFR BLD AUTO: 76 % (ref 43–75)
NRBC BLD AUTO-RTO: 0 /100 WBCS
PLATELET # BLD AUTO: 337 THOUSANDS/UL (ref 149–390)
PMV BLD AUTO: 10.6 FL (ref 8.9–12.7)
POTASSIUM SERPL-SCNC: 3.8 MMOL/L (ref 3.5–5.3)
PROT SERPL-MCNC: 5.9 G/DL (ref 6.4–8.2)
RBC # BLD AUTO: 3.91 MILLION/UL (ref 3.81–5.12)
SODIUM SERPL-SCNC: 139 MMOL/L (ref 136–145)
WBC # BLD AUTO: 10.18 THOUSAND/UL (ref 4.31–10.16)

## 2020-10-23 PROCEDURE — 99232 SBSQ HOSP IP/OBS MODERATE 35: CPT | Performed by: INTERNAL MEDICINE

## 2020-10-23 PROCEDURE — 80053 COMPREHEN METABOLIC PANEL: CPT | Performed by: FAMILY MEDICINE

## 2020-10-23 PROCEDURE — 85025 COMPLETE CBC W/AUTO DIFF WBC: CPT | Performed by: FAMILY MEDICINE

## 2020-10-23 PROCEDURE — 83520 IMMUNOASSAY QUANT NOS NONAB: CPT | Performed by: FAMILY MEDICINE

## 2020-10-23 PROCEDURE — 99232 SBSQ HOSP IP/OBS MODERATE 35: CPT | Performed by: FAMILY MEDICINE

## 2020-10-23 RX ORDER — DIPHENHYDRAMINE HYDROCHLORIDE 50 MG/ML
50 INJECTION INTRAMUSCULAR; INTRAVENOUS ONCE
Status: COMPLETED | OUTPATIENT
Start: 2020-10-23 | End: 2020-10-23

## 2020-10-23 RX ADMIN — LORAZEPAM 0.5 MG: 2 INJECTION INTRAMUSCULAR; INTRAVENOUS at 05:58

## 2020-10-23 RX ADMIN — METHYLPREDNISOLONE SODIUM SUCCINATE 20 MG: 40 INJECTION, POWDER, FOR SOLUTION INTRAMUSCULAR; INTRAVENOUS at 22:30

## 2020-10-23 RX ADMIN — LORAZEPAM 0.5 MG: 2 INJECTION INTRAMUSCULAR; INTRAVENOUS at 22:33

## 2020-10-23 RX ADMIN — POTASSIUM CHLORIDE 20 MEQ: 14.9 INJECTION, SOLUTION INTRAVENOUS at 00:45

## 2020-10-23 RX ADMIN — FAMOTIDINE 40 MG: 10 INJECTION INTRAVENOUS at 09:42

## 2020-10-23 RX ADMIN — HYDROMORPHONE HYDROCHLORIDE 2 MG: 2 INJECTION, SOLUTION INTRAMUSCULAR; INTRAVENOUS; SUBCUTANEOUS at 05:58

## 2020-10-23 RX ADMIN — ONDANSETRON 4 MG: 2 INJECTION INTRAMUSCULAR; INTRAVENOUS at 22:26

## 2020-10-23 RX ADMIN — DIPHENHYDRAMINE HYDROCHLORIDE 50 MG: 50 INJECTION, SOLUTION INTRAMUSCULAR; INTRAVENOUS at 18:54

## 2020-10-23 RX ADMIN — METHYLPREDNISOLONE SODIUM SUCCINATE 20 MG: 40 INJECTION, POWDER, FOR SOLUTION INTRAMUSCULAR; INTRAVENOUS at 05:04

## 2020-10-23 RX ADMIN — HYDROMORPHONE HYDROCHLORIDE 2 MG: 2 INJECTION, SOLUTION INTRAMUSCULAR; INTRAVENOUS; SUBCUTANEOUS at 23:22

## 2020-10-23 RX ADMIN — ENOXAPARIN SODIUM 40 MG: 40 INJECTION SUBCUTANEOUS at 17:16

## 2020-10-23 RX ADMIN — FAMOTIDINE 40 MG: 10 INJECTION INTRAVENOUS at 23:24

## 2020-10-23 RX ADMIN — HYDROMORPHONE HYDROCHLORIDE 2 MG: 2 INJECTION, SOLUTION INTRAMUSCULAR; INTRAVENOUS; SUBCUTANEOUS at 10:00

## 2020-10-23 RX ADMIN — SODIUM CHLORIDE, SODIUM LACTATE, POTASSIUM CHLORIDE, AND CALCIUM CHLORIDE 75 ML/HR: .6; .31; .03; .02 INJECTION, SOLUTION INTRAVENOUS at 13:21

## 2020-10-23 RX ADMIN — METHYLPREDNISOLONE SODIUM SUCCINATE 20 MG: 40 INJECTION, POWDER, FOR SOLUTION INTRAMUSCULAR; INTRAVENOUS at 13:13

## 2020-10-23 RX ADMIN — LORAZEPAM 0.5 MG: 2 INJECTION INTRAMUSCULAR; INTRAVENOUS at 14:47

## 2020-10-23 RX ADMIN — HYDROMORPHONE HYDROCHLORIDE 1 MG: 1 INJECTION, SOLUTION INTRAMUSCULAR; INTRAVENOUS; SUBCUTANEOUS at 14:46

## 2020-10-23 RX ADMIN — DIPHENHYDRAMINE HYDROCHLORIDE 50 MG: 50 INJECTION, SOLUTION INTRAMUSCULAR; INTRAVENOUS at 05:04

## 2020-10-23 RX ADMIN — DIPHENHYDRAMINE HYDROCHLORIDE 50 MG: 50 INJECTION, SOLUTION INTRAMUSCULAR; INTRAVENOUS at 13:12

## 2020-10-23 RX ADMIN — DIPHENHYDRAMINE HYDROCHLORIDE 50 MG: 50 INJECTION, SOLUTION INTRAMUSCULAR; INTRAVENOUS at 22:52

## 2020-10-23 RX ADMIN — DIPHENHYDRAMINE HYDROCHLORIDE 50 MG: 50 INJECTION, SOLUTION INTRAMUSCULAR; INTRAVENOUS at 09:33

## 2020-10-23 RX ADMIN — HYDROMORPHONE HYDROCHLORIDE 2 MG: 2 INJECTION, SOLUTION INTRAMUSCULAR; INTRAVENOUS; SUBCUTANEOUS at 18:54

## 2020-10-24 LAB
BILIRUB UR QL STRIP: NEGATIVE
CLARITY UR: CLEAR
COLOR UR: YELLOW
GLUCOSE SERPL-MCNC: 140 MG/DL (ref 65–140)
GLUCOSE UR STRIP-MCNC: NEGATIVE MG/DL
HGB UR QL STRIP.AUTO: NEGATIVE
KETONES UR STRIP-MCNC: NEGATIVE MG/DL
LEUKOCYTE ESTERASE UR QL STRIP: NEGATIVE
MAGNESIUM SERPL-MCNC: 2.5 MG/DL (ref 1.6–2.6)
NITRITE UR QL STRIP: NEGATIVE
PH UR STRIP.AUTO: 7.5 [PH]
PHOSPHATE SERPL-MCNC: 2.6 MG/DL (ref 2.7–4.5)
PROT UR STRIP-MCNC: NEGATIVE MG/DL
SP GR UR STRIP.AUTO: 1.01 (ref 1–1.03)
UROBILINOGEN UR QL STRIP.AUTO: 1 E.U./DL

## 2020-10-24 PROCEDURE — 82948 REAGENT STRIP/BLOOD GLUCOSE: CPT

## 2020-10-24 PROCEDURE — 99232 SBSQ HOSP IP/OBS MODERATE 35: CPT | Performed by: INTERNAL MEDICINE

## 2020-10-24 PROCEDURE — 84100 ASSAY OF PHOSPHORUS: CPT | Performed by: INTERNAL MEDICINE

## 2020-10-24 PROCEDURE — 83735 ASSAY OF MAGNESIUM: CPT | Performed by: INTERNAL MEDICINE

## 2020-10-24 PROCEDURE — 81003 URINALYSIS AUTO W/O SCOPE: CPT | Performed by: FAMILY MEDICINE

## 2020-10-24 RX ORDER — DIPHENHYDRAMINE HYDROCHLORIDE 50 MG/ML
25 INJECTION INTRAMUSCULAR; INTRAVENOUS ONCE
Status: COMPLETED | OUTPATIENT
Start: 2020-10-24 | End: 2020-10-24

## 2020-10-24 RX ADMIN — POTASSIUM PHOSPHATE, MONOBASIC POTASSIUM PHOSPHATE, DIBASIC 9 MMOL: 224; 236 INJECTION, SOLUTION, CONCENTRATE INTRAVENOUS at 16:10

## 2020-10-24 RX ADMIN — LORAZEPAM 0.5 MG: 2 INJECTION INTRAMUSCULAR; INTRAVENOUS at 16:10

## 2020-10-24 RX ADMIN — DIPHENHYDRAMINE HYDROCHLORIDE 50 MG: 50 INJECTION, SOLUTION INTRAMUSCULAR; INTRAVENOUS at 08:05

## 2020-10-24 RX ADMIN — ENOXAPARIN SODIUM 40 MG: 40 INJECTION SUBCUTANEOUS at 16:10

## 2020-10-24 RX ADMIN — DIPHENHYDRAMINE HYDROCHLORIDE 50 MG: 50 INJECTION, SOLUTION INTRAMUSCULAR; INTRAVENOUS at 18:39

## 2020-10-24 RX ADMIN — DIPHENHYDRAMINE HYDROCHLORIDE 25 MG: 50 INJECTION, SOLUTION INTRAMUSCULAR; INTRAVENOUS at 16:36

## 2020-10-24 RX ADMIN — DIPHENHYDRAMINE HYDROCHLORIDE 50 MG: 50 INJECTION, SOLUTION INTRAMUSCULAR; INTRAVENOUS at 22:48

## 2020-10-24 RX ADMIN — ONDANSETRON 4 MG: 2 INJECTION INTRAMUSCULAR; INTRAVENOUS at 21:27

## 2020-10-24 RX ADMIN — DIPHENHYDRAMINE HYDROCHLORIDE 50 MG: 50 INJECTION, SOLUTION INTRAMUSCULAR; INTRAVENOUS at 03:50

## 2020-10-24 RX ADMIN — HYDROMORPHONE HYDROCHLORIDE 2 MG: 2 INJECTION, SOLUTION INTRAMUSCULAR; INTRAVENOUS; SUBCUTANEOUS at 22:50

## 2020-10-24 RX ADMIN — HYDROMORPHONE HYDROCHLORIDE 2 MG: 2 INJECTION, SOLUTION INTRAMUSCULAR; INTRAVENOUS; SUBCUTANEOUS at 18:39

## 2020-10-24 RX ADMIN — FAMOTIDINE 40 MG: 10 INJECTION INTRAVENOUS at 08:08

## 2020-10-24 RX ADMIN — DIPHENHYDRAMINE HYDROCHLORIDE 50 MG: 50 INJECTION, SOLUTION INTRAMUSCULAR; INTRAVENOUS at 14:13

## 2020-10-24 RX ADMIN — METHYLPREDNISOLONE SODIUM SUCCINATE 20 MG: 40 INJECTION, POWDER, FOR SOLUTION INTRAMUSCULAR; INTRAVENOUS at 21:30

## 2020-10-24 RX ADMIN — SODIUM CHLORIDE, SODIUM LACTATE, POTASSIUM CHLORIDE, AND CALCIUM CHLORIDE 150 ML/HR: .6; .31; .03; .02 INJECTION, SOLUTION INTRAVENOUS at 22:46

## 2020-10-24 RX ADMIN — METHYLPREDNISOLONE SODIUM SUCCINATE 20 MG: 40 INJECTION, POWDER, FOR SOLUTION INTRAMUSCULAR; INTRAVENOUS at 05:43

## 2020-10-24 RX ADMIN — HYDROMORPHONE HYDROCHLORIDE 2 MG: 2 INJECTION, SOLUTION INTRAMUSCULAR; INTRAVENOUS; SUBCUTANEOUS at 06:53

## 2020-10-24 RX ADMIN — ONDANSETRON 4 MG: 2 INJECTION INTRAMUSCULAR; INTRAVENOUS at 06:50

## 2020-10-24 RX ADMIN — HYDROMORPHONE HYDROCHLORIDE 2 MG: 2 INJECTION, SOLUTION INTRAMUSCULAR; INTRAVENOUS; SUBCUTANEOUS at 03:44

## 2020-10-24 RX ADMIN — SODIUM CHLORIDE, SODIUM LACTATE, POTASSIUM CHLORIDE, AND CALCIUM CHLORIDE 150 ML/HR: .6; .31; .03; .02 INJECTION, SOLUTION INTRAVENOUS at 13:37

## 2020-10-24 RX ADMIN — FAMOTIDINE 40 MG: 10 INJECTION INTRAVENOUS at 21:34

## 2020-10-24 RX ADMIN — SODIUM CHLORIDE, SODIUM LACTATE, POTASSIUM CHLORIDE, AND CALCIUM CHLORIDE 150 ML/HR: .6; .31; .03; .02 INJECTION, SOLUTION INTRAVENOUS at 06:48

## 2020-10-24 RX ADMIN — LORAZEPAM 0.5 MG: 2 INJECTION INTRAMUSCULAR; INTRAVENOUS at 21:32

## 2020-10-24 RX ADMIN — METHYLPREDNISOLONE SODIUM SUCCINATE 20 MG: 40 INJECTION, POWDER, FOR SOLUTION INTRAMUSCULAR; INTRAVENOUS at 13:27

## 2020-10-24 RX ADMIN — HYDROMORPHONE HYDROCHLORIDE 2 MG: 2 INJECTION, SOLUTION INTRAMUSCULAR; INTRAVENOUS; SUBCUTANEOUS at 14:32

## 2020-10-25 LAB
ANION GAP SERPL CALCULATED.3IONS-SCNC: 4 MMOL/L (ref 4–13)
BASOPHILS # BLD AUTO: 0.02 THOUSANDS/ΜL (ref 0–0.1)
BASOPHILS NFR BLD AUTO: 0 % (ref 0–1)
BUN SERPL-MCNC: 6 MG/DL (ref 5–25)
CALCIUM SERPL-MCNC: 8.4 MG/DL (ref 8.3–10.1)
CALPROTECTIN STL-MCNT: 1133 UG/G (ref 0–120)
CHLORIDE SERPL-SCNC: 102 MMOL/L (ref 100–108)
CO2 SERPL-SCNC: 32 MMOL/L (ref 21–32)
CREAT SERPL-MCNC: 0.66 MG/DL (ref 0.6–1.3)
EOSINOPHIL # BLD AUTO: 0 THOUSAND/ΜL (ref 0–0.61)
EOSINOPHIL NFR BLD AUTO: 0 % (ref 0–6)
ERYTHROCYTE [DISTWIDTH] IN BLOOD BY AUTOMATED COUNT: 13.5 % (ref 11.6–15.1)
GFR SERPL CREATININE-BSD FRML MDRD: 106 ML/MIN/1.73SQ M
GLUCOSE SERPL-MCNC: 118 MG/DL (ref 65–140)
HCT VFR BLD AUTO: 43.1 % (ref 34.8–46.1)
HGB BLD-MCNC: 14.1 G/DL (ref 11.5–15.4)
IMM GRANULOCYTES # BLD AUTO: 0.07 THOUSAND/UL (ref 0–0.2)
IMM GRANULOCYTES NFR BLD AUTO: 1 % (ref 0–2)
LYMPHOCYTES # BLD AUTO: 1.12 THOUSANDS/ΜL (ref 0.6–4.47)
LYMPHOCYTES NFR BLD AUTO: 13 % (ref 14–44)
MAGNESIUM SERPL-MCNC: 2.5 MG/DL (ref 1.6–2.6)
MCH RBC QN AUTO: 31.8 PG (ref 26.8–34.3)
MCHC RBC AUTO-ENTMCNC: 32.7 G/DL (ref 31.4–37.4)
MCV RBC AUTO: 97 FL (ref 82–98)
MONOCYTES # BLD AUTO: 0.48 THOUSAND/ΜL (ref 0.17–1.22)
MONOCYTES NFR BLD AUTO: 5 % (ref 4–12)
NEUTROPHILS # BLD AUTO: 7.3 THOUSANDS/ΜL (ref 1.85–7.62)
NEUTS SEG NFR BLD AUTO: 81 % (ref 43–75)
NRBC BLD AUTO-RTO: 0 /100 WBCS
PHOSPHATE SERPL-MCNC: 4.2 MG/DL (ref 2.7–4.5)
PLATELET # BLD AUTO: 401 THOUSANDS/UL (ref 149–390)
PMV BLD AUTO: 9.2 FL (ref 8.9–12.7)
POTASSIUM SERPL-SCNC: 3.9 MMOL/L (ref 3.5–5.3)
RBC # BLD AUTO: 4.44 MILLION/UL (ref 3.81–5.12)
SODIUM SERPL-SCNC: 138 MMOL/L (ref 136–145)
TRYPTASE SERPL-MCNC: 5.8 UG/L (ref 2.2–13.2)
WBC # BLD AUTO: 8.99 THOUSAND/UL (ref 4.31–10.16)

## 2020-10-25 PROCEDURE — 84100 ASSAY OF PHOSPHORUS: CPT | Performed by: INTERNAL MEDICINE

## 2020-10-25 PROCEDURE — 85025 COMPLETE CBC W/AUTO DIFF WBC: CPT | Performed by: INTERNAL MEDICINE

## 2020-10-25 PROCEDURE — 83735 ASSAY OF MAGNESIUM: CPT | Performed by: INTERNAL MEDICINE

## 2020-10-25 PROCEDURE — 99232 SBSQ HOSP IP/OBS MODERATE 35: CPT | Performed by: FAMILY MEDICINE

## 2020-10-25 PROCEDURE — 80048 BASIC METABOLIC PNL TOTAL CA: CPT | Performed by: INTERNAL MEDICINE

## 2020-10-25 RX ORDER — METHYLPREDNISOLONE SODIUM SUCCINATE 40 MG/ML
20 INJECTION, POWDER, LYOPHILIZED, FOR SOLUTION INTRAMUSCULAR; INTRAVENOUS EVERY 12 HOURS SCHEDULED
Status: DISCONTINUED | OUTPATIENT
Start: 2020-10-25 | End: 2020-10-28

## 2020-10-25 RX ADMIN — HYDROMORPHONE HYDROCHLORIDE 2 MG: 2 INJECTION, SOLUTION INTRAMUSCULAR; INTRAVENOUS; SUBCUTANEOUS at 22:14

## 2020-10-25 RX ADMIN — SODIUM CHLORIDE, SODIUM LACTATE, POTASSIUM CHLORIDE, AND CALCIUM CHLORIDE 150 ML/HR: .6; .31; .03; .02 INJECTION, SOLUTION INTRAVENOUS at 18:41

## 2020-10-25 RX ADMIN — ONDANSETRON 4 MG: 2 INJECTION INTRAMUSCULAR; INTRAVENOUS at 14:59

## 2020-10-25 RX ADMIN — DIPHENHYDRAMINE HYDROCHLORIDE 50 MG: 50 INJECTION, SOLUTION INTRAMUSCULAR; INTRAVENOUS at 14:55

## 2020-10-25 RX ADMIN — POTASSIUM PHOSPHATE, MONOBASIC POTASSIUM PHOSPHATE, DIBASIC 6 MMOL: 224; 236 INJECTION, SOLUTION, CONCENTRATE INTRAVENOUS at 20:13

## 2020-10-25 RX ADMIN — METHYLPREDNISOLONE SODIUM SUCCINATE 20 MG: 40 INJECTION, POWDER, FOR SOLUTION INTRAMUSCULAR; INTRAVENOUS at 05:22

## 2020-10-25 RX ADMIN — FAMOTIDINE 40 MG: 10 INJECTION INTRAVENOUS at 08:22

## 2020-10-25 RX ADMIN — SODIUM CHLORIDE, SODIUM LACTATE, POTASSIUM CHLORIDE, AND CALCIUM CHLORIDE 150 ML/HR: .6; .31; .03; .02 INJECTION, SOLUTION INTRAVENOUS at 05:21

## 2020-10-25 RX ADMIN — LORAZEPAM 0.5 MG: 2 INJECTION INTRAMUSCULAR; INTRAVENOUS at 23:22

## 2020-10-25 RX ADMIN — LORAZEPAM 0.5 MG: 2 INJECTION INTRAMUSCULAR; INTRAVENOUS at 10:46

## 2020-10-25 RX ADMIN — LORAZEPAM 0.5 MG: 2 INJECTION INTRAMUSCULAR; INTRAVENOUS at 19:10

## 2020-10-25 RX ADMIN — ONDANSETRON 4 MG: 2 INJECTION INTRAMUSCULAR; INTRAVENOUS at 05:44

## 2020-10-25 RX ADMIN — ENOXAPARIN SODIUM 40 MG: 40 INJECTION SUBCUTANEOUS at 15:30

## 2020-10-25 RX ADMIN — LORAZEPAM 0.5 MG: 2 INJECTION INTRAMUSCULAR; INTRAVENOUS at 03:54

## 2020-10-25 RX ADMIN — FAMOTIDINE 40 MG: 10 INJECTION INTRAVENOUS at 20:11

## 2020-10-25 RX ADMIN — DIPHENHYDRAMINE HYDROCHLORIDE 50 MG: 50 INJECTION, SOLUTION INTRAMUSCULAR; INTRAVENOUS at 19:10

## 2020-10-25 RX ADMIN — LORAZEPAM 0.5 MG: 2 INJECTION INTRAMUSCULAR; INTRAVENOUS at 14:53

## 2020-10-25 RX ADMIN — DIPHENHYDRAMINE HYDROCHLORIDE 50 MG: 50 INJECTION, SOLUTION INTRAMUSCULAR; INTRAVENOUS at 10:43

## 2020-10-25 RX ADMIN — DIPHENHYDRAMINE HYDROCHLORIDE 50 MG: 50 INJECTION, SOLUTION INTRAMUSCULAR; INTRAVENOUS at 05:42

## 2020-10-25 RX ADMIN — DIPHENHYDRAMINE HYDROCHLORIDE 50 MG: 50 INJECTION, SOLUTION INTRAMUSCULAR; INTRAVENOUS at 23:17

## 2020-10-25 RX ADMIN — DIPHENHYDRAMINE HYDROCHLORIDE 50 MG: 50 INJECTION, SOLUTION INTRAMUSCULAR; INTRAVENOUS at 01:40

## 2020-10-25 RX ADMIN — METHYLPREDNISOLONE SODIUM SUCCINATE 20 MG: 40 INJECTION, POWDER, FOR SOLUTION INTRAMUSCULAR; INTRAVENOUS at 20:10

## 2020-10-25 RX ADMIN — SODIUM CHLORIDE, SODIUM LACTATE, POTASSIUM CHLORIDE, AND CALCIUM CHLORIDE 150 ML/HR: .6; .31; .03; .02 INJECTION, SOLUTION INTRAVENOUS at 11:45

## 2020-10-25 RX ADMIN — HYDROMORPHONE HYDROCHLORIDE 2 MG: 2 INJECTION, SOLUTION INTRAMUSCULAR; INTRAVENOUS; SUBCUTANEOUS at 07:51

## 2020-10-25 RX ADMIN — HYDROMORPHONE HYDROCHLORIDE 1 MG: 1 INJECTION, SOLUTION INTRAMUSCULAR; INTRAVENOUS; SUBCUTANEOUS at 17:23

## 2020-10-26 LAB
ALBUMIN SERPL BCP-MCNC: 3.2 G/DL (ref 3.5–5)
ALP SERPL-CCNC: 45 U/L (ref 46–116)
ALT SERPL W P-5'-P-CCNC: 29 U/L (ref 12–78)
ANION GAP SERPL CALCULATED.3IONS-SCNC: 4 MMOL/L (ref 4–13)
AST SERPL W P-5'-P-CCNC: 10 U/L (ref 5–45)
BILIRUB SERPL-MCNC: 0.34 MG/DL (ref 0.2–1)
BUN SERPL-MCNC: 9 MG/DL (ref 5–25)
CALCIUM ALBUM COR SERPL-MCNC: 9.1 MG/DL (ref 8.3–10.1)
CALCIUM SERPL-MCNC: 8.5 MG/DL (ref 8.3–10.1)
CHLORIDE SERPL-SCNC: 103 MMOL/L (ref 100–108)
CO2 SERPL-SCNC: 33 MMOL/L (ref 21–32)
CREAT SERPL-MCNC: 0.72 MG/DL (ref 0.6–1.3)
GFR SERPL CREATININE-BSD FRML MDRD: 100 ML/MIN/1.73SQ M
GLUCOSE SERPL-MCNC: 98 MG/DL (ref 65–140)
MAGNESIUM SERPL-MCNC: 2.6 MG/DL (ref 1.6–2.6)
PHOSPHATE SERPL-MCNC: 4.5 MG/DL (ref 2.7–4.5)
POTASSIUM SERPL-SCNC: 3.7 MMOL/L (ref 3.5–5.3)
PROT SERPL-MCNC: 6.7 G/DL (ref 6.4–8.2)
SODIUM SERPL-SCNC: 140 MMOL/L (ref 136–145)

## 2020-10-26 PROCEDURE — 99232 SBSQ HOSP IP/OBS MODERATE 35: CPT | Performed by: INTERNAL MEDICINE

## 2020-10-26 PROCEDURE — 83735 ASSAY OF MAGNESIUM: CPT | Performed by: FAMILY MEDICINE

## 2020-10-26 PROCEDURE — 84100 ASSAY OF PHOSPHORUS: CPT | Performed by: FAMILY MEDICINE

## 2020-10-26 PROCEDURE — 99232 SBSQ HOSP IP/OBS MODERATE 35: CPT | Performed by: FAMILY MEDICINE

## 2020-10-26 PROCEDURE — 80053 COMPREHEN METABOLIC PANEL: CPT | Performed by: FAMILY MEDICINE

## 2020-10-26 RX ADMIN — DIPHENHYDRAMINE HYDROCHLORIDE 50 MG: 50 INJECTION, SOLUTION INTRAMUSCULAR; INTRAVENOUS at 16:43

## 2020-10-26 RX ADMIN — DIPHENHYDRAMINE HYDROCHLORIDE 50 MG: 50 INJECTION, SOLUTION INTRAMUSCULAR; INTRAVENOUS at 04:16

## 2020-10-26 RX ADMIN — ENOXAPARIN SODIUM 40 MG: 40 INJECTION SUBCUTANEOUS at 16:09

## 2020-10-26 RX ADMIN — HYDROMORPHONE HYDROCHLORIDE 2 MG: 2 INJECTION, SOLUTION INTRAMUSCULAR; INTRAVENOUS; SUBCUTANEOUS at 22:26

## 2020-10-26 RX ADMIN — DIPHENHYDRAMINE HYDROCHLORIDE 50 MG: 50 INJECTION, SOLUTION INTRAMUSCULAR; INTRAVENOUS at 12:29

## 2020-10-26 RX ADMIN — LORAZEPAM 0.5 MG: 2 INJECTION INTRAMUSCULAR; INTRAVENOUS at 12:30

## 2020-10-26 RX ADMIN — LORAZEPAM 0.5 MG: 2 INJECTION INTRAMUSCULAR; INTRAVENOUS at 21:33

## 2020-10-26 RX ADMIN — METHYLPREDNISOLONE SODIUM SUCCINATE 20 MG: 40 INJECTION, POWDER, FOR SOLUTION INTRAMUSCULAR; INTRAVENOUS at 21:28

## 2020-10-26 RX ADMIN — LORAZEPAM 0.5 MG: 2 INJECTION INTRAMUSCULAR; INTRAVENOUS at 16:43

## 2020-10-26 RX ADMIN — SODIUM CHLORIDE, SODIUM LACTATE, POTASSIUM CHLORIDE, AND CALCIUM CHLORIDE 150 ML/HR: .6; .31; .03; .02 INJECTION, SOLUTION INTRAVENOUS at 10:47

## 2020-10-26 RX ADMIN — DIPHENHYDRAMINE HYDROCHLORIDE 50 MG: 50 INJECTION, SOLUTION INTRAMUSCULAR; INTRAVENOUS at 08:11

## 2020-10-26 RX ADMIN — FAMOTIDINE 40 MG: 10 INJECTION INTRAVENOUS at 21:35

## 2020-10-26 RX ADMIN — HYDROMORPHONE HYDROCHLORIDE 2 MG: 2 INJECTION, SOLUTION INTRAMUSCULAR; INTRAVENOUS; SUBCUTANEOUS at 14:28

## 2020-10-26 RX ADMIN — ONDANSETRON 4 MG: 2 INJECTION INTRAMUSCULAR; INTRAVENOUS at 12:28

## 2020-10-26 RX ADMIN — ONDANSETRON 4 MG: 2 INJECTION INTRAMUSCULAR; INTRAVENOUS at 05:46

## 2020-10-26 RX ADMIN — LORAZEPAM 0.5 MG: 2 INJECTION INTRAMUSCULAR; INTRAVENOUS at 08:10

## 2020-10-26 RX ADMIN — FAMOTIDINE 40 MG: 10 INJECTION INTRAVENOUS at 08:11

## 2020-10-26 RX ADMIN — HYDROMORPHONE HYDROCHLORIDE 1 MG: 1 INJECTION, SOLUTION INTRAMUSCULAR; INTRAVENOUS; SUBCUTANEOUS at 05:48

## 2020-10-26 RX ADMIN — DIPHENHYDRAMINE HYDROCHLORIDE 50 MG: 50 INJECTION, SOLUTION INTRAMUSCULAR; INTRAVENOUS at 21:31

## 2020-10-26 RX ADMIN — LORAZEPAM 0.5 MG: 2 INJECTION INTRAMUSCULAR; INTRAVENOUS at 04:17

## 2020-10-26 RX ADMIN — HYDROMORPHONE HYDROCHLORIDE 1 MG: 1 INJECTION, SOLUTION INTRAMUSCULAR; INTRAVENOUS; SUBCUTANEOUS at 09:51

## 2020-10-26 RX ADMIN — SODIUM CHLORIDE, SODIUM LACTATE, POTASSIUM CHLORIDE, AND CALCIUM CHLORIDE 150 ML/HR: .6; .31; .03; .02 INJECTION, SOLUTION INTRAVENOUS at 04:24

## 2020-10-26 RX ADMIN — METHYLPREDNISOLONE SODIUM SUCCINATE 20 MG: 40 INJECTION, POWDER, FOR SOLUTION INTRAMUSCULAR; INTRAVENOUS at 08:10

## 2020-10-26 RX ADMIN — ONDANSETRON 4 MG: 2 INJECTION INTRAMUSCULAR; INTRAVENOUS at 21:30

## 2020-10-27 ENCOUNTER — TELEPHONE (OUTPATIENT)
Dept: GASTROENTEROLOGY | Facility: AMBULARY SURGERY CENTER | Age: 45
End: 2020-10-27

## 2020-10-27 PROCEDURE — 99232 SBSQ HOSP IP/OBS MODERATE 35: CPT | Performed by: NURSE PRACTITIONER

## 2020-10-27 RX ORDER — CROMOLYN SODIUM 100 MG/5ML
200 SOLUTION, CONCENTRATE ORAL 4 TIMES DAILY
Status: DISCONTINUED | OUTPATIENT
Start: 2020-10-27 | End: 2020-10-28 | Stop reason: HOSPADM

## 2020-10-27 RX ORDER — LORAZEPAM 2 MG/ML
0.5 INJECTION INTRAMUSCULAR EVERY 6 HOURS PRN
Status: DISCONTINUED | OUTPATIENT
Start: 2020-10-27 | End: 2020-10-28

## 2020-10-27 RX ORDER — MONTELUKAST SODIUM 10 MG/1
10 TABLET ORAL
Status: DISCONTINUED | OUTPATIENT
Start: 2020-10-27 | End: 2020-10-28 | Stop reason: HOSPADM

## 2020-10-27 RX ORDER — LORATADINE 10 MG/1
10 TABLET ORAL 2 TIMES DAILY
Status: DISCONTINUED | OUTPATIENT
Start: 2020-10-27 | End: 2020-10-27

## 2020-10-27 RX ORDER — HYDROMORPHONE HCL 110MG/55ML
2 PATIENT CONTROLLED ANALGESIA SYRINGE INTRAVENOUS EVERY 6 HOURS PRN
Status: DISCONTINUED | OUTPATIENT
Start: 2020-10-27 | End: 2020-10-28

## 2020-10-27 RX ORDER — FEXOFENADINE HCL 180 MG/1
180 TABLET ORAL DAILY
Status: DISCONTINUED | OUTPATIENT
Start: 2020-10-27 | End: 2020-10-28 | Stop reason: HOSPADM

## 2020-10-27 RX ORDER — CROMOLYN SODIUM 100 MG/5ML
200 SOLUTION, CONCENTRATE ORAL
Qty: 1200 ML | Refills: 0 | Status: SHIPPED | OUTPATIENT
Start: 2020-10-27 | End: 2020-11-26

## 2020-10-27 RX ORDER — HYDROMORPHONE HCL/PF 1 MG/ML
1 SYRINGE (ML) INJECTION EVERY 6 HOURS PRN
Status: DISCONTINUED | OUTPATIENT
Start: 2020-10-27 | End: 2020-10-28

## 2020-10-27 RX ADMIN — LORAZEPAM 0.5 MG: 2 INJECTION INTRAMUSCULAR; INTRAVENOUS at 07:13

## 2020-10-27 RX ADMIN — DIPHENHYDRAMINE HYDROCHLORIDE 50 MG: 50 INJECTION, SOLUTION INTRAMUSCULAR; INTRAVENOUS at 02:52

## 2020-10-27 RX ADMIN — DIPHENHYDRAMINE HYDROCHLORIDE 50 MG: 50 INJECTION, SOLUTION INTRAMUSCULAR; INTRAVENOUS at 21:14

## 2020-10-27 RX ADMIN — METHYLPREDNISOLONE SODIUM SUCCINATE 20 MG: 40 INJECTION, POWDER, FOR SOLUTION INTRAMUSCULAR; INTRAVENOUS at 08:24

## 2020-10-27 RX ADMIN — LORAZEPAM 0.5 MG: 2 INJECTION INTRAMUSCULAR; INTRAVENOUS at 02:54

## 2020-10-27 RX ADMIN — LORAZEPAM 0.5 MG: 2 INJECTION INTRAMUSCULAR; INTRAVENOUS at 21:05

## 2020-10-27 RX ADMIN — LORAZEPAM 0.5 MG: 2 INJECTION INTRAMUSCULAR; INTRAVENOUS at 00:05

## 2020-10-27 RX ADMIN — METHYLPREDNISOLONE SODIUM SUCCINATE 20 MG: 40 INJECTION, POWDER, FOR SOLUTION INTRAMUSCULAR; INTRAVENOUS at 21:14

## 2020-10-27 RX ADMIN — FAMOTIDINE 40 MG: 10 INJECTION INTRAVENOUS at 08:24

## 2020-10-27 RX ADMIN — ONDANSETRON 4 MG: 2 INJECTION INTRAMUSCULAR; INTRAVENOUS at 22:26

## 2020-10-27 RX ADMIN — ENOXAPARIN SODIUM 40 MG: 40 INJECTION SUBCUTANEOUS at 16:04

## 2020-10-27 RX ADMIN — LORAZEPAM 0.5 MG: 2 INJECTION INTRAMUSCULAR; INTRAVENOUS at 13:07

## 2020-10-27 RX ADMIN — SODIUM CHLORIDE, SODIUM LACTATE, POTASSIUM CHLORIDE, AND CALCIUM CHLORIDE 150 ML/HR: .6; .31; .03; .02 INJECTION, SOLUTION INTRAVENOUS at 13:14

## 2020-10-27 RX ADMIN — FAMOTIDINE 40 MG: 10 INJECTION INTRAVENOUS at 21:07

## 2020-10-27 RX ADMIN — DIPHENHYDRAMINE HYDROCHLORIDE 50 MG: 50 INJECTION, SOLUTION INTRAMUSCULAR; INTRAVENOUS at 12:42

## 2020-10-27 RX ADMIN — HYDROMORPHONE HYDROCHLORIDE 2 MG: 2 INJECTION, SOLUTION INTRAMUSCULAR; INTRAVENOUS; SUBCUTANEOUS at 22:26

## 2020-10-27 RX ADMIN — ONDANSETRON 4 MG: 2 INJECTION INTRAMUSCULAR; INTRAVENOUS at 04:47

## 2020-10-27 RX ADMIN — DIPHENHYDRAMINE HYDROCHLORIDE 50 MG: 50 INJECTION, SOLUTION INTRAMUSCULAR; INTRAVENOUS at 07:13

## 2020-10-27 RX ADMIN — HYDROMORPHONE HYDROCHLORIDE 2 MG: 2 INJECTION, SOLUTION INTRAMUSCULAR; INTRAVENOUS; SUBCUTANEOUS at 10:15

## 2020-10-27 RX ADMIN — SODIUM CHLORIDE, SODIUM LACTATE, POTASSIUM CHLORIDE, AND CALCIUM CHLORIDE 150 ML/HR: .6; .31; .03; .02 INJECTION, SOLUTION INTRAVENOUS at 00:02

## 2020-10-27 RX ADMIN — HYDROMORPHONE HYDROCHLORIDE 2 MG: 2 INJECTION, SOLUTION INTRAMUSCULAR; INTRAVENOUS; SUBCUTANEOUS at 16:03

## 2020-10-27 RX ADMIN — ONDANSETRON 4 MG: 2 INJECTION INTRAMUSCULAR; INTRAVENOUS at 16:04

## 2020-10-27 RX ADMIN — HYDROMORPHONE HYDROCHLORIDE 2 MG: 2 INJECTION, SOLUTION INTRAMUSCULAR; INTRAVENOUS; SUBCUTANEOUS at 04:49

## 2020-10-27 RX ADMIN — ONDANSETRON 4 MG: 2 INJECTION INTRAMUSCULAR; INTRAVENOUS at 10:15

## 2020-10-28 ENCOUNTER — TELEPHONE (OUTPATIENT)
Dept: GASTROENTEROLOGY | Facility: MEDICAL CENTER | Age: 45
End: 2020-10-28

## 2020-10-28 VITALS
OXYGEN SATURATION: 96 % | HEIGHT: 60 IN | SYSTOLIC BLOOD PRESSURE: 94 MMHG | BODY MASS INDEX: 26.82 KG/M2 | TEMPERATURE: 97.9 F | DIASTOLIC BLOOD PRESSURE: 59 MMHG | WEIGHT: 136.6 LBS | HEART RATE: 59 BPM | RESPIRATION RATE: 16 BRPM

## 2020-10-28 PROCEDURE — 99239 HOSP IP/OBS DSCHRG MGMT >30: CPT | Performed by: NURSE PRACTITIONER

## 2020-10-28 RX ORDER — ONDANSETRON 4 MG/1
4 TABLET, ORALLY DISINTEGRATING ORAL EVERY 6 HOURS PRN
Status: DISCONTINUED | OUTPATIENT
Start: 2020-10-28 | End: 2020-10-28 | Stop reason: HOSPADM

## 2020-10-28 RX ORDER — DIPHENHYDRAMINE HCL 25 MG
50 TABLET ORAL EVERY 6 HOURS PRN
Status: DISCONTINUED | OUTPATIENT
Start: 2020-10-28 | End: 2020-10-28 | Stop reason: HOSPADM

## 2020-10-28 RX ORDER — PREDNISONE 20 MG/1
40 TABLET ORAL DAILY
Status: DISCONTINUED | OUTPATIENT
Start: 2020-10-29 | End: 2020-10-28 | Stop reason: HOSPADM

## 2020-10-28 RX ORDER — PREDNISONE 20 MG/1
40 TABLET ORAL DAILY
Qty: 60 TABLET | Refills: 0 | Status: SHIPPED | OUTPATIENT
Start: 2020-10-28 | End: 2020-12-14

## 2020-10-28 RX ORDER — MONTELUKAST SODIUM 10 MG/1
10 TABLET ORAL
Qty: 30 TABLET | Refills: 0 | Status: SHIPPED | OUTPATIENT
Start: 2020-10-28

## 2020-10-28 RX ORDER — FAMOTIDINE 20 MG/1
20 TABLET, FILM COATED ORAL EVERY 12 HOURS SCHEDULED
Status: DISCONTINUED | OUTPATIENT
Start: 2020-10-28 | End: 2020-10-28 | Stop reason: HOSPADM

## 2020-10-28 RX ORDER — FAMOTIDINE 40 MG/5ML
20 POWDER, FOR SUSPENSION ORAL 2 TIMES DAILY
Qty: 150 ML | Refills: 0 | Status: ON HOLD | OUTPATIENT
Start: 2020-10-28 | End: 2021-03-03 | Stop reason: ALTCHOICE

## 2020-10-28 RX ADMIN — LORAZEPAM 0.5 MG: 2 INJECTION INTRAMUSCULAR; INTRAVENOUS at 04:31

## 2020-10-28 RX ADMIN — METHYLPREDNISOLONE SODIUM SUCCINATE 20 MG: 40 INJECTION, POWDER, FOR SOLUTION INTRAMUSCULAR; INTRAVENOUS at 10:00

## 2020-10-28 RX ADMIN — DIPHENHYDRAMINE HYDROCHLORIDE 50 MG: 50 INJECTION, SOLUTION INTRAMUSCULAR; INTRAVENOUS at 01:34

## 2020-10-28 RX ADMIN — SODIUM CHLORIDE, SODIUM LACTATE, POTASSIUM CHLORIDE, AND CALCIUM CHLORIDE 150 ML/HR: .6; .31; .03; .02 INJECTION, SOLUTION INTRAVENOUS at 04:31

## 2020-10-28 RX ADMIN — LORAZEPAM 0.5 MG: 2 INJECTION INTRAMUSCULAR; INTRAVENOUS at 10:34

## 2020-10-28 RX ADMIN — DIPHENHYDRAMINE HYDROCHLORIDE 50 MG: 50 INJECTION, SOLUTION INTRAMUSCULAR; INTRAVENOUS at 10:34

## 2020-10-28 RX ADMIN — ONDANSETRON 4 MG: 2 INJECTION INTRAMUSCULAR; INTRAVENOUS at 04:31

## 2020-10-28 RX ADMIN — HYDROMORPHONE HYDROCHLORIDE 2 MG: 2 INJECTION, SOLUTION INTRAMUSCULAR; INTRAVENOUS; SUBCUTANEOUS at 04:31

## 2020-10-29 ENCOUNTER — TELEPHONE (OUTPATIENT)
Dept: GASTROENTEROLOGY | Facility: MEDICAL CENTER | Age: 45
End: 2020-10-29

## 2020-10-29 DIAGNOSIS — F41.9 ANXIETY: ICD-10-CM

## 2020-10-29 DIAGNOSIS — F51.04 PSYCHOPHYSIOLOGICAL INSOMNIA: ICD-10-CM

## 2020-10-29 RX ORDER — LORAZEPAM 1 MG/1
1 TABLET ORAL EVERY 8 HOURS PRN
Qty: 21 TABLET | Refills: 0 | Status: CANCELLED | OUTPATIENT
Start: 2020-10-29

## 2020-10-30 ENCOUNTER — TELEPHONE (OUTPATIENT)
Dept: GASTROENTEROLOGY | Facility: MEDICAL CENTER | Age: 45
End: 2020-10-30

## 2020-10-30 ENCOUNTER — TELEPHONE (OUTPATIENT)
Dept: GASTROENTEROLOGY | Facility: CLINIC | Age: 45
End: 2020-10-30

## 2020-10-30 ENCOUNTER — TRANSITIONAL CARE MANAGEMENT (OUTPATIENT)
Dept: FAMILY MEDICINE CLINIC | Facility: CLINIC | Age: 45
End: 2020-10-30

## 2020-10-30 RX ORDER — SODIUM CHLORIDE 9 MG/ML
20 INJECTION, SOLUTION INTRAVENOUS ONCE
Status: CANCELLED | OUTPATIENT
Start: 2020-11-05

## 2020-10-31 RX ORDER — TEMAZEPAM 30 MG/1
30 CAPSULE ORAL
Qty: 30 CAPSULE | Refills: 1 | Status: SHIPPED | OUTPATIENT
Start: 2020-10-31 | End: 2021-02-08 | Stop reason: SDUPTHER

## 2020-11-03 ENCOUNTER — OFFICE VISIT (OUTPATIENT)
Dept: FAMILY MEDICINE CLINIC | Facility: CLINIC | Age: 45
End: 2020-11-03
Payer: COMMERCIAL

## 2020-11-03 VITALS
HEART RATE: 90 BPM | HEIGHT: 60 IN | DIASTOLIC BLOOD PRESSURE: 80 MMHG | OXYGEN SATURATION: 96 % | TEMPERATURE: 99 F | BODY MASS INDEX: 28 KG/M2 | WEIGHT: 142.6 LBS | SYSTOLIC BLOOD PRESSURE: 112 MMHG

## 2020-11-03 DIAGNOSIS — R69 CHRONIC DISEASE: ICD-10-CM

## 2020-11-03 DIAGNOSIS — Z76.89 ENCOUNTER FOR SUPPORT AND COORDINATION OF TRANSITION OF CARE: Primary | ICD-10-CM

## 2020-11-03 DIAGNOSIS — Z12.31 SCREENING MAMMOGRAM, ENCOUNTER FOR: ICD-10-CM

## 2020-11-03 DIAGNOSIS — D89.40 MAST CELL ACTIVATION SYNDROME (HCC): ICD-10-CM

## 2020-11-03 DIAGNOSIS — K51.919 ULCERATIVE COLITIS WITH COMPLICATION, UNSPECIFIED LOCATION (HCC): ICD-10-CM

## 2020-11-03 DIAGNOSIS — F41.9 ANXIETY: ICD-10-CM

## 2020-11-03 DIAGNOSIS — R00.2 PALPITATION: ICD-10-CM

## 2020-11-03 PROCEDURE — 3008F BODY MASS INDEX DOCD: CPT | Performed by: INTERNAL MEDICINE

## 2020-11-03 PROCEDURE — 99214 OFFICE O/P EST MOD 30 MIN: CPT | Performed by: FAMILY MEDICINE

## 2020-11-03 RX ORDER — BUTALBITAL, ACETAMINOPHEN AND CAFFEINE 50; 325; 40 MG/1; MG/1; MG/1
1 TABLET ORAL EVERY 8 HOURS PRN
Qty: 20 TABLET | Refills: 0 | Status: SHIPPED | OUTPATIENT
Start: 2020-11-03 | End: 2021-01-20 | Stop reason: SDUPTHER

## 2020-11-04 ENCOUNTER — DOCUMENTATION (OUTPATIENT)
Dept: FAMILY MEDICINE CLINIC | Facility: CLINIC | Age: 45
End: 2020-11-04

## 2020-11-04 ENCOUNTER — TELEPHONE (OUTPATIENT)
Dept: BEHAVIORAL/MENTAL HEALTH CLINIC | Facility: CLINIC | Age: 45
End: 2020-11-04

## 2020-11-05 ENCOUNTER — HOSPITAL ENCOUNTER (OUTPATIENT)
Dept: INFUSION CENTER | Facility: CLINIC | Age: 45
Discharge: HOME | End: 2020-11-05
Payer: COMMERCIAL

## 2020-11-05 VITALS
DIASTOLIC BLOOD PRESSURE: 83 MMHG | SYSTOLIC BLOOD PRESSURE: 120 MMHG | HEART RATE: 84 BPM | TEMPERATURE: 97.7 F | RESPIRATION RATE: 18 BRPM

## 2020-11-05 DIAGNOSIS — E44.1 MILD PROTEIN-CALORIE MALNUTRITION (HCC): ICD-10-CM

## 2020-11-05 DIAGNOSIS — R63.0 ANOREXIA: ICD-10-CM

## 2020-11-05 DIAGNOSIS — K51.919 ULCERATIVE COLITIS WITH COMPLICATION, UNSPECIFIED LOCATION (HCC): Primary | ICD-10-CM

## 2020-11-05 DIAGNOSIS — K50.119 CROHN'S DISEASE OF COLON WITH COMPLICATION (HCC): ICD-10-CM

## 2020-11-05 PROCEDURE — 96365 THER/PROPH/DIAG IV INF INIT: CPT

## 2020-11-05 PROCEDURE — 96361 HYDRATE IV INFUSION ADD-ON: CPT

## 2020-11-05 PROCEDURE — 96366 THER/PROPH/DIAG IV INF ADDON: CPT

## 2020-11-05 RX ORDER — SODIUM CHLORIDE 9 MG/ML
20 INJECTION, SOLUTION INTRAVENOUS ONCE
Status: COMPLETED | OUTPATIENT
Start: 2020-11-05 | End: 2020-11-05

## 2020-11-05 RX ORDER — SODIUM CHLORIDE 9 MG/ML
20 INJECTION, SOLUTION INTRAVENOUS ONCE
Status: CANCELLED | OUTPATIENT
Start: 2020-11-05

## 2020-11-05 RX ADMIN — SODIUM CHLORIDE 20 ML/HR: 0.9 INJECTION, SOLUTION INTRAVENOUS at 12:10

## 2020-11-05 RX ADMIN — SODIUM CHLORIDE 1000 ML: 0.9 INJECTION, SOLUTION INTRAVENOUS at 12:15

## 2020-11-05 RX ADMIN — USTEKINUMAB 390 MG: 130 SOLUTION INTRAVENOUS at 14:14

## 2020-11-09 ENCOUNTER — LAB (OUTPATIENT)
Dept: LAB | Facility: HOSPITAL | Age: 45
End: 2020-11-09
Attending: INTERNAL MEDICINE
Payer: COMMERCIAL

## 2020-11-09 ENCOUNTER — TRANSCRIBE ORDERS (OUTPATIENT)
Dept: ADMINISTRATIVE | Facility: HOSPITAL | Age: 45
End: 2020-11-09

## 2020-11-09 DIAGNOSIS — M25.50 PAIN IN JOINT, MULTIPLE SITES: ICD-10-CM

## 2020-11-09 DIAGNOSIS — K90.9 IDIOPATHIC STEATORRHEA: ICD-10-CM

## 2020-11-09 DIAGNOSIS — K90.9 STEATORRHEA: ICD-10-CM

## 2020-11-09 DIAGNOSIS — K52.9 INFLAMMATORY BOWEL DISEASE: ICD-10-CM

## 2020-11-09 DIAGNOSIS — K52.9 IBD (INFLAMMATORY BOWEL DISEASE): ICD-10-CM

## 2020-11-09 DIAGNOSIS — K52.9 INFLAMMATORY BOWEL DISEASE: Primary | ICD-10-CM

## 2020-11-09 LAB
BASOPHILS # BLD AUTO: 0.07 THOUSANDS/ΜL (ref 0–0.1)
BASOPHILS NFR BLD AUTO: 1 % (ref 0–1)
EOSINOPHIL # BLD AUTO: 0.1 THOUSAND/ΜL (ref 0–0.61)
EOSINOPHIL NFR BLD AUTO: 1 % (ref 0–6)
ERYTHROCYTE [DISTWIDTH] IN BLOOD BY AUTOMATED COUNT: 13.9 % (ref 11.6–15.1)
EST. AVERAGE GLUCOSE BLD GHB EST-MCNC: 108 MG/DL
FERRITIN SERPL-MCNC: 42 NG/ML (ref 8–388)
HBA1C MFR BLD: 5.4 %
HCT VFR BLD AUTO: 43.2 % (ref 34.8–46.1)
HGB BLD-MCNC: 14.3 G/DL (ref 11.5–15.4)
IMM GRANULOCYTES # BLD AUTO: 0.04 THOUSAND/UL (ref 0–0.2)
IMM GRANULOCYTES NFR BLD AUTO: 0 % (ref 0–2)
INSULIN SERPL-ACNC: 5.6 MU/L (ref 3–25)
LYMPHOCYTES # BLD AUTO: 2.23 THOUSANDS/ΜL (ref 0.6–4.47)
LYMPHOCYTES NFR BLD AUTO: 23 % (ref 14–44)
MCH RBC QN AUTO: 32.1 PG (ref 26.8–34.3)
MCHC RBC AUTO-ENTMCNC: 33.1 G/DL (ref 31.4–37.4)
MCV RBC AUTO: 97 FL (ref 82–98)
MONOCYTES # BLD AUTO: 0.59 THOUSAND/ΜL (ref 0.17–1.22)
MONOCYTES NFR BLD AUTO: 6 % (ref 4–12)
NEUTROPHILS # BLD AUTO: 6.82 THOUSANDS/ΜL (ref 1.85–7.62)
NEUTS SEG NFR BLD AUTO: 69 % (ref 43–75)
NRBC BLD AUTO-RTO: 0 /100 WBCS
PLATELET # BLD AUTO: 359 THOUSANDS/UL (ref 149–390)
PMV BLD AUTO: 8.7 FL (ref 8.9–12.7)
RBC # BLD AUTO: 4.46 MILLION/UL (ref 3.81–5.12)
WBC # BLD AUTO: 9.85 THOUSAND/UL (ref 4.31–10.16)

## 2020-11-09 PROCEDURE — 86430 RHEUMATOID FACTOR TEST QUAL: CPT

## 2020-11-09 PROCEDURE — 82728 ASSAY OF FERRITIN: CPT

## 2020-11-09 PROCEDURE — 82397 CHEMILUMINESCENT ASSAY: CPT

## 2020-11-09 PROCEDURE — 86225 DNA ANTIBODY NATIVE: CPT

## 2020-11-09 PROCEDURE — 83525 ASSAY OF INSULIN: CPT

## 2020-11-09 PROCEDURE — 80145 DRUG ASSAY ADALIMUMAB: CPT

## 2020-11-09 PROCEDURE — 83036 HEMOGLOBIN GLYCOSYLATED A1C: CPT

## 2020-11-09 PROCEDURE — 82656 EL-1 FECAL QUAL/SEMIQ: CPT

## 2020-11-09 PROCEDURE — 36415 COLL VENOUS BLD VENIPUNCTURE: CPT

## 2020-11-09 PROCEDURE — 83993 ASSAY FOR CALPROTECTIN FECAL: CPT

## 2020-11-09 PROCEDURE — 86235 NUCLEAR ANTIGEN ANTIBODY: CPT

## 2020-11-09 PROCEDURE — 82705 FATS/LIPIDS FECES QUAL: CPT

## 2020-11-09 PROCEDURE — 85025 COMPLETE CBC W/AUTO DIFF WBC: CPT

## 2020-11-10 LAB
DSDNA AB SER-ACNC: 1 IU/ML (ref 0–9)
FAT STL QL: NORMAL
NEUTRAL FAT STL QL: NORMAL
RHEUMATOID FACT SER QL LA: NEGATIVE

## 2020-11-11 LAB
CALPROTECTIN STL-MCNT: 272 UG/G (ref 0–120)
HISTONE IGG SER IA-ACNC: 0.9 UNITS (ref 0–0.9)

## 2020-11-12 ENCOUNTER — TELEMEDICINE (OUTPATIENT)
Dept: GASTROENTEROLOGY | Facility: MEDICAL CENTER | Age: 45
End: 2020-11-12
Payer: COMMERCIAL

## 2020-11-12 DIAGNOSIS — D89.40 MAST CELL ACTIVATION SYNDROME (HCC): ICD-10-CM

## 2020-11-12 DIAGNOSIS — T50.905A DRUG-INDUCED LUPUS ERYTHEMATOSUS: ICD-10-CM

## 2020-11-12 DIAGNOSIS — L50.1 CHRONIC IDIOPATHIC URTICARIA: ICD-10-CM

## 2020-11-12 DIAGNOSIS — L93.2 DRUG-INDUCED LUPUS ERYTHEMATOSUS: ICD-10-CM

## 2020-11-12 DIAGNOSIS — K50.119 CROHN'S DISEASE OF COLON WITH COMPLICATION (HCC): Primary | ICD-10-CM

## 2020-11-12 DIAGNOSIS — D84.9 IMMUNOSUPPRESSED STATUS (HCC): ICD-10-CM

## 2020-11-12 DIAGNOSIS — E44.1 MILD PROTEIN-CALORIE MALNUTRITION (HCC): ICD-10-CM

## 2020-11-12 DIAGNOSIS — K92.89 GASTROINTESTINAL DYSMOTILITY: ICD-10-CM

## 2020-11-12 PROCEDURE — 1036F TOBACCO NON-USER: CPT | Performed by: INTERNAL MEDICINE

## 2020-11-12 PROCEDURE — 99215 OFFICE O/P EST HI 40 MIN: CPT | Performed by: INTERNAL MEDICINE

## 2020-11-12 RX ORDER — BUSPIRONE HYDROCHLORIDE 5 MG/1
5 TABLET ORAL EVERY 24 HOURS
Qty: 30 TABLET | Refills: 0 | Status: SHIPPED | OUTPATIENT
Start: 2020-11-12 | End: 2020-12-22

## 2020-11-13 ENCOUNTER — TELEPHONE (OUTPATIENT)
Dept: GASTROENTEROLOGY | Facility: AMBULARY SURGERY CENTER | Age: 45
End: 2020-11-13

## 2020-11-13 ENCOUNTER — TELEPHONE (OUTPATIENT)
Dept: FAMILY MEDICINE CLINIC | Facility: CLINIC | Age: 45
End: 2020-11-13

## 2020-11-13 DIAGNOSIS — K50.119 CROHN'S DISEASE OF COLON WITH COMPLICATION (HCC): Primary | ICD-10-CM

## 2020-11-13 DIAGNOSIS — R63.0 ANOREXIA: ICD-10-CM

## 2020-11-13 DIAGNOSIS — E44.1 MILD PROTEIN-CALORIE MALNUTRITION (HCC): ICD-10-CM

## 2020-11-15 LAB — ELASTASE PANC STL-MCNT: >500 UG ELAST./G

## 2020-11-16 LAB
ADALIMUMAB AB SERPL-MCNC: <25 NG/ML
ADALIMUMAB SERPL-MCNC: <0.6 UG/ML

## 2020-11-17 DIAGNOSIS — K50.119 CROHN'S DISEASE OF COLON WITH COMPLICATION (HCC): Primary | ICD-10-CM

## 2020-11-17 DIAGNOSIS — E44.1 MILD PROTEIN-CALORIE MALNUTRITION (HCC): ICD-10-CM

## 2020-11-17 DIAGNOSIS — R63.0 ANOREXIA: ICD-10-CM

## 2020-11-18 ENCOUNTER — TELEPHONE (OUTPATIENT)
Dept: FAMILY MEDICINE CLINIC | Facility: CLINIC | Age: 45
End: 2020-11-18

## 2020-11-19 ENCOUNTER — HOSPITAL ENCOUNTER (OUTPATIENT)
Dept: INFUSION CENTER | Facility: CLINIC | Age: 45
Discharge: HOME | End: 2020-11-19
Payer: COMMERCIAL

## 2020-11-19 VITALS
TEMPERATURE: 98.9 F | RESPIRATION RATE: 16 BRPM | HEART RATE: 98 BPM | SYSTOLIC BLOOD PRESSURE: 125 MMHG | DIASTOLIC BLOOD PRESSURE: 78 MMHG

## 2020-11-19 DIAGNOSIS — R63.0 ANOREXIA: Primary | ICD-10-CM

## 2020-11-19 DIAGNOSIS — K50.119 CROHN'S DISEASE OF COLON WITH COMPLICATION (HCC): ICD-10-CM

## 2020-11-19 DIAGNOSIS — E44.1 MILD PROTEIN-CALORIE MALNUTRITION (HCC): ICD-10-CM

## 2020-11-19 PROCEDURE — 96360 HYDRATION IV INFUSION INIT: CPT

## 2020-11-19 PROCEDURE — 96361 HYDRATE IV INFUSION ADD-ON: CPT

## 2020-11-19 RX ADMIN — SODIUM CHLORIDE 1000 ML: 0.9 INJECTION, SOLUTION INTRAVENOUS at 13:08

## 2020-11-20 DIAGNOSIS — F41.9 ANXIETY: ICD-10-CM

## 2020-11-23 ENCOUNTER — TELEPHONE (OUTPATIENT)
Dept: FAMILY MEDICINE CLINIC | Facility: CLINIC | Age: 45
End: 2020-11-23

## 2020-11-23 RX ORDER — LORAZEPAM 1 MG/1
1 TABLET ORAL EVERY 8 HOURS PRN
Qty: 21 TABLET | Refills: 0 | Status: SHIPPED | OUTPATIENT
Start: 2020-11-23 | End: 2021-02-02 | Stop reason: SDUPTHER

## 2020-11-25 ENCOUNTER — HOSPITAL ENCOUNTER (OUTPATIENT)
Dept: INFUSION CENTER | Facility: CLINIC | Age: 45
End: 2020-11-25

## 2020-11-27 ENCOUNTER — HOSPITAL ENCOUNTER (OUTPATIENT)
Dept: INFUSION CENTER | Facility: CLINIC | Age: 45
Discharge: HOME | End: 2020-11-27
Payer: COMMERCIAL

## 2020-11-27 VITALS
DIASTOLIC BLOOD PRESSURE: 77 MMHG | SYSTOLIC BLOOD PRESSURE: 119 MMHG | HEART RATE: 98 BPM | TEMPERATURE: 98.6 F | RESPIRATION RATE: 18 BRPM

## 2020-11-27 DIAGNOSIS — R63.0 ANOREXIA: Primary | ICD-10-CM

## 2020-11-27 DIAGNOSIS — K50.119 CROHN'S DISEASE OF COLON WITH COMPLICATION (HCC): ICD-10-CM

## 2020-11-27 DIAGNOSIS — E44.1 MILD PROTEIN-CALORIE MALNUTRITION (HCC): ICD-10-CM

## 2020-11-27 PROCEDURE — 96361 HYDRATE IV INFUSION ADD-ON: CPT

## 2020-11-27 PROCEDURE — 96360 HYDRATION IV INFUSION INIT: CPT

## 2020-11-27 RX ADMIN — SODIUM CHLORIDE 1000 ML: 0.9 INJECTION, SOLUTION INTRAVENOUS at 13:58

## 2020-12-01 ENCOUNTER — TELEPHONE (OUTPATIENT)
Dept: GASTROENTEROLOGY | Facility: MEDICAL CENTER | Age: 45
End: 2020-12-01

## 2020-12-01 DIAGNOSIS — K50.119 CROHN'S DISEASE OF COLON WITH COMPLICATION (HCC): ICD-10-CM

## 2020-12-01 DIAGNOSIS — D89.40 MAST CELL ACTIVATION SYNDROME (HCC): Primary | ICD-10-CM

## 2020-12-01 RX ORDER — PREDNISONE 50 MG/1
TABLET ORAL
Qty: 2 TABLET | Refills: 0 | Status: SHIPPED | OUTPATIENT
Start: 2020-12-01 | End: 2020-12-14

## 2020-12-02 DIAGNOSIS — K51.919 ULCERATIVE COLITIS WITH COMPLICATION, UNSPECIFIED LOCATION (HCC): ICD-10-CM

## 2020-12-02 DIAGNOSIS — D89.40 MAST CELL ACTIVATION SYNDROME (HCC): ICD-10-CM

## 2020-12-02 RX ORDER — PREDNISONE 1 MG/1
TABLET ORAL
Qty: 360 TABLET | Refills: 0 | Status: SHIPPED | OUTPATIENT
Start: 2020-12-02 | End: 2020-12-14

## 2020-12-03 ENCOUNTER — HOSPITAL ENCOUNTER (OUTPATIENT)
Dept: INFUSION CENTER | Facility: CLINIC | Age: 45
Discharge: HOME | End: 2020-12-03
Payer: COMMERCIAL

## 2020-12-03 VITALS
RESPIRATION RATE: 16 BRPM | DIASTOLIC BLOOD PRESSURE: 73 MMHG | TEMPERATURE: 99 F | SYSTOLIC BLOOD PRESSURE: 113 MMHG | HEART RATE: 102 BPM

## 2020-12-03 DIAGNOSIS — E44.1 MILD PROTEIN-CALORIE MALNUTRITION (HCC): ICD-10-CM

## 2020-12-03 DIAGNOSIS — R63.0 ANOREXIA: Primary | ICD-10-CM

## 2020-12-03 DIAGNOSIS — K50.119 CROHN'S DISEASE OF COLON WITH COMPLICATION (HCC): ICD-10-CM

## 2020-12-03 PROCEDURE — 96360 HYDRATION IV INFUSION INIT: CPT

## 2020-12-03 PROCEDURE — 96361 HYDRATE IV INFUSION ADD-ON: CPT

## 2020-12-03 RX ADMIN — SODIUM CHLORIDE 1000 ML: 0.9 INJECTION, SOLUTION INTRAVENOUS at 14:20

## 2020-12-07 ENCOUNTER — APPOINTMENT (EMERGENCY)
Dept: CT IMAGING | Facility: HOSPITAL | Age: 45
End: 2020-12-07
Payer: COMMERCIAL

## 2020-12-07 ENCOUNTER — ANESTHESIA (EMERGENCY)
Dept: ANESTHESIOLOGY | Facility: HOSPITAL | Age: 45
End: 2020-12-07
Payer: COMMERCIAL

## 2020-12-07 ENCOUNTER — ANESTHESIA EVENT (EMERGENCY)
Dept: ANESTHESIOLOGY | Facility: HOSPITAL | Age: 45
End: 2020-12-07
Payer: COMMERCIAL

## 2020-12-07 ENCOUNTER — APPOINTMENT (EMERGENCY)
Dept: RADIOLOGY | Facility: HOSPITAL | Age: 45
End: 2020-12-07
Payer: COMMERCIAL

## 2020-12-07 ENCOUNTER — HOSPITAL ENCOUNTER (EMERGENCY)
Facility: HOSPITAL | Age: 45
Discharge: HOME | End: 2020-12-07
Attending: EMERGENCY MEDICINE
Payer: COMMERCIAL

## 2020-12-07 VITALS
RESPIRATION RATE: 16 BRPM | OXYGEN SATURATION: 100 % | HEART RATE: 98 BPM | DIASTOLIC BLOOD PRESSURE: 78 MMHG | SYSTOLIC BLOOD PRESSURE: 125 MMHG

## 2020-12-07 VITALS
BODY MASS INDEX: 27.86 KG/M2 | WEIGHT: 142.64 LBS | DIASTOLIC BLOOD PRESSURE: 69 MMHG | OXYGEN SATURATION: 99 % | HEART RATE: 73 BPM | SYSTOLIC BLOOD PRESSURE: 106 MMHG | RESPIRATION RATE: 22 BRPM

## 2020-12-07 DIAGNOSIS — G97.1 POST-DURAL PUNCTURE HEADACHE: ICD-10-CM

## 2020-12-07 DIAGNOSIS — G97.1 SPINAL HEADACHE: Primary | ICD-10-CM

## 2020-12-07 LAB
ALBUMIN SERPL BCP-MCNC: 2.7 G/DL (ref 3.5–5)
ALP SERPL-CCNC: 46 U/L (ref 46–116)
ALT SERPL W P-5'-P-CCNC: 18 U/L (ref 12–78)
ANION GAP SERPL CALCULATED.3IONS-SCNC: 8 MMOL/L (ref 4–13)
AST SERPL W P-5'-P-CCNC: 12 U/L (ref 5–45)
BASOPHILS # BLD AUTO: 0.02 THOUSANDS/ΜL (ref 0–0.1)
BASOPHILS NFR BLD AUTO: 0 % (ref 0–1)
BILIRUB SERPL-MCNC: 0.2 MG/DL (ref 0.2–1)
BUN SERPL-MCNC: 12 MG/DL (ref 5–25)
CALCIUM ALBUM COR SERPL-MCNC: 8.9 MG/DL (ref 8.3–10.1)
CALCIUM SERPL-MCNC: 7.9 MG/DL (ref 8.3–10.1)
CHLORIDE SERPL-SCNC: 104 MMOL/L (ref 100–108)
CO2 SERPL-SCNC: 24 MMOL/L (ref 21–32)
CREAT SERPL-MCNC: 0.73 MG/DL (ref 0.6–1.3)
D DIMER PPP FEU-MCNC: 0.47 UG/ML FEU
EOSINOPHIL # BLD AUTO: 0.01 THOUSAND/ΜL (ref 0–0.61)
EOSINOPHIL NFR BLD AUTO: 0 % (ref 0–6)
ERYTHROCYTE [DISTWIDTH] IN BLOOD BY AUTOMATED COUNT: 13.7 % (ref 11.6–15.1)
GFR SERPL CREATININE-BSD FRML MDRD: 98 ML/MIN/1.73SQ M
GLUCOSE SERPL-MCNC: 172 MG/DL (ref 65–140)
HCT VFR BLD AUTO: 43.6 % (ref 34.8–46.1)
HGB BLD-MCNC: 14 G/DL (ref 11.5–15.4)
IMM GRANULOCYTES # BLD AUTO: 0.1 THOUSAND/UL (ref 0–0.2)
IMM GRANULOCYTES NFR BLD AUTO: 1 % (ref 0–2)
LYMPHOCYTES # BLD AUTO: 0.69 THOUSANDS/ΜL (ref 0.6–4.47)
LYMPHOCYTES NFR BLD AUTO: 6 % (ref 14–44)
MCH RBC QN AUTO: 31.7 PG (ref 26.8–34.3)
MCHC RBC AUTO-ENTMCNC: 32.1 G/DL (ref 31.4–37.4)
MCV RBC AUTO: 99 FL (ref 82–98)
MONOCYTES # BLD AUTO: 0.25 THOUSAND/ΜL (ref 0.17–1.22)
MONOCYTES NFR BLD AUTO: 2 % (ref 4–12)
NEUTROPHILS # BLD AUTO: 9.8 THOUSANDS/ΜL (ref 1.85–7.62)
NEUTS SEG NFR BLD AUTO: 91 % (ref 43–75)
NRBC BLD AUTO-RTO: 0 /100 WBCS
PLATELET # BLD AUTO: 410 THOUSANDS/UL (ref 149–390)
PMV BLD AUTO: 8.8 FL (ref 8.9–12.7)
POTASSIUM SERPL-SCNC: 3.6 MMOL/L (ref 3.5–5.3)
PROT SERPL-MCNC: 5.7 G/DL (ref 6.4–8.2)
RBC # BLD AUTO: 4.41 MILLION/UL (ref 3.81–5.12)
SODIUM SERPL-SCNC: 136 MMOL/L (ref 136–145)
TROPONIN I SERPL-MCNC: <0.02 NG/ML
WBC # BLD AUTO: 10.87 THOUSAND/UL (ref 4.31–10.16)

## 2020-12-07 PROCEDURE — 99285 EMERGENCY DEPT VISIT HI MDM: CPT | Performed by: PHYSICIAN ASSISTANT

## 2020-12-07 PROCEDURE — 96375 TX/PRO/DX INJ NEW DRUG ADDON: CPT

## 2020-12-07 PROCEDURE — 71045 X-RAY EXAM CHEST 1 VIEW: CPT

## 2020-12-07 PROCEDURE — 96361 HYDRATE IV INFUSION ADD-ON: CPT

## 2020-12-07 PROCEDURE — 70450 CT HEAD/BRAIN W/O DYE: CPT

## 2020-12-07 PROCEDURE — 85379 FIBRIN DEGRADATION QUANT: CPT | Performed by: PHYSICIAN ASSISTANT

## 2020-12-07 PROCEDURE — G1004 CDSM NDSC: HCPCS

## 2020-12-07 PROCEDURE — 36415 COLL VENOUS BLD VENIPUNCTURE: CPT | Performed by: PHYSICIAN ASSISTANT

## 2020-12-07 PROCEDURE — 96374 THER/PROPH/DIAG INJ IV PUSH: CPT

## 2020-12-07 PROCEDURE — 96376 TX/PRO/DX INJ SAME DRUG ADON: CPT

## 2020-12-07 PROCEDURE — 85025 COMPLETE CBC W/AUTO DIFF WBC: CPT | Performed by: PHYSICIAN ASSISTANT

## 2020-12-07 PROCEDURE — 80053 COMPREHEN METABOLIC PANEL: CPT | Performed by: PHYSICIAN ASSISTANT

## 2020-12-07 PROCEDURE — 84484 ASSAY OF TROPONIN QUANT: CPT | Performed by: PHYSICIAN ASSISTANT

## 2020-12-07 PROCEDURE — 99285 EMERGENCY DEPT VISIT HI MDM: CPT

## 2020-12-07 PROCEDURE — 93005 ELECTROCARDIOGRAM TRACING: CPT

## 2020-12-07 RX ORDER — ONDANSETRON 2 MG/ML
4 INJECTION INTRAMUSCULAR; INTRAVENOUS ONCE
Status: COMPLETED | OUTPATIENT
Start: 2020-12-07 | End: 2020-12-07

## 2020-12-07 RX ORDER — HYDROMORPHONE HCL/PF 1 MG/ML
0.5 SYRINGE (ML) INJECTION ONCE
Status: COMPLETED | OUTPATIENT
Start: 2020-12-07 | End: 2020-12-07

## 2020-12-07 RX ORDER — SODIUM CHLORIDE 9 MG/ML
125 INJECTION, SOLUTION INTRAVENOUS CONTINUOUS
Status: DISCONTINUED | OUTPATIENT
Start: 2020-12-07 | End: 2020-12-07 | Stop reason: HOSPADM

## 2020-12-07 RX ORDER — DIPHENHYDRAMINE HYDROCHLORIDE 50 MG/ML
50 INJECTION INTRAMUSCULAR; INTRAVENOUS ONCE
Status: COMPLETED | OUTPATIENT
Start: 2020-12-07 | End: 2020-12-07

## 2020-12-07 RX ORDER — BUTALBITAL, ACETAMINOPHEN AND CAFFEINE 50; 325; 40 MG/1; MG/1; MG/1
1 TABLET ORAL ONCE
Status: COMPLETED | OUTPATIENT
Start: 2020-12-07 | End: 2020-12-07

## 2020-12-07 RX ORDER — METHYLPREDNISOLONE SODIUM SUCCINATE 125 MG/2ML
125 INJECTION, POWDER, LYOPHILIZED, FOR SOLUTION INTRAMUSCULAR; INTRAVENOUS ONCE
Status: COMPLETED | OUTPATIENT
Start: 2020-12-07 | End: 2020-12-07

## 2020-12-07 RX ORDER — FENTANYL CITRATE 50 UG/ML
1 INJECTION, SOLUTION INTRAMUSCULAR; INTRAVENOUS ONCE
Status: COMPLETED | OUTPATIENT
Start: 2020-12-07 | End: 2020-12-07

## 2020-12-07 RX ORDER — ONDANSETRON 2 MG/ML
1 INJECTION INTRAMUSCULAR; INTRAVENOUS ONCE
Status: COMPLETED | OUTPATIENT
Start: 2020-12-07 | End: 2020-12-07

## 2020-12-07 RX ORDER — HYDROMORPHONE HCL/PF 1 MG/ML
1 SYRINGE (ML) INJECTION ONCE
Status: COMPLETED | OUTPATIENT
Start: 2020-12-07 | End: 2020-12-07

## 2020-12-07 RX ADMIN — SODIUM CHLORIDE 1000 ML: 0.9 INJECTION, SOLUTION INTRAVENOUS at 14:19

## 2020-12-07 RX ADMIN — SODIUM CHLORIDE 1000 ML: 0.9 INJECTION, SOLUTION INTRAVENOUS at 16:20

## 2020-12-07 RX ADMIN — BUTALBITAL, ACETAMINOPHEN AND CAFFEINE 1 TABLET: 50; 325; 40 TABLET ORAL at 16:24

## 2020-12-07 RX ADMIN — SODIUM CHLORIDE 125 ML/HR: 0.9 INJECTION, SOLUTION INTRAVENOUS at 18:43

## 2020-12-07 RX ADMIN — ONDANSETRON 4 MG: 2 INJECTION INTRAMUSCULAR; INTRAVENOUS at 16:22

## 2020-12-07 RX ADMIN — HYDROMORPHONE HYDROCHLORIDE 0.5 MG: 1 INJECTION, SOLUTION INTRAMUSCULAR; INTRAVENOUS; SUBCUTANEOUS at 16:25

## 2020-12-07 RX ADMIN — DIPHENHYDRAMINE HYDROCHLORIDE 50 MG: 50 INJECTION, SOLUTION INTRAMUSCULAR; INTRAVENOUS at 17:43

## 2020-12-07 RX ADMIN — METHYLPREDNISOLONE SODIUM SUCCINATE 125 MG: 125 INJECTION, POWDER, FOR SOLUTION INTRAMUSCULAR; INTRAVENOUS at 17:38

## 2020-12-07 RX ADMIN — HYDROMORPHONE HYDROCHLORIDE 0.5 MG: 1 INJECTION, SOLUTION INTRAMUSCULAR; INTRAVENOUS; SUBCUTANEOUS at 20:08

## 2020-12-07 RX ADMIN — HYDROMORPHONE HYDROCHLORIDE 1 MG: 1 INJECTION, SOLUTION INTRAMUSCULAR; INTRAVENOUS; SUBCUTANEOUS at 14:20

## 2020-12-08 LAB
ATRIAL RATE: 87 BPM
P AXIS: 54 DEGREES
PR INTERVAL: 118 MS
QRS AXIS: 53 DEGREES
QRSD INTERVAL: 78 MS
QT INTERVAL: 372 MS
QTC INTERVAL: 447 MS
T WAVE AXIS: 55 DEGREES
VENTRICULAR RATE: 87 BPM

## 2020-12-08 PROCEDURE — 93010 ELECTROCARDIOGRAM REPORT: CPT | Performed by: INTERNAL MEDICINE

## 2020-12-11 ENCOUNTER — HOSPITAL ENCOUNTER (OUTPATIENT)
Dept: INFUSION CENTER | Facility: CLINIC | Age: 45
Discharge: HOME | End: 2020-12-11
Payer: COMMERCIAL

## 2020-12-11 VITALS
DIASTOLIC BLOOD PRESSURE: 88 MMHG | SYSTOLIC BLOOD PRESSURE: 124 MMHG | RESPIRATION RATE: 16 BRPM | TEMPERATURE: 99.3 F | HEART RATE: 103 BPM

## 2020-12-11 DIAGNOSIS — E44.1 MILD PROTEIN-CALORIE MALNUTRITION (HCC): ICD-10-CM

## 2020-12-11 DIAGNOSIS — R63.0 ANOREXIA: Primary | ICD-10-CM

## 2020-12-11 DIAGNOSIS — K50.119 CROHN'S DISEASE OF COLON WITH COMPLICATION (HCC): ICD-10-CM

## 2020-12-11 PROCEDURE — 96360 HYDRATION IV INFUSION INIT: CPT

## 2020-12-11 PROCEDURE — 96361 HYDRATE IV INFUSION ADD-ON: CPT

## 2020-12-11 RX ADMIN — SODIUM CHLORIDE 1000 ML: 0.9 INJECTION, SOLUTION INTRAVENOUS at 12:59

## 2020-12-14 ENCOUNTER — TELEPHONE (OUTPATIENT)
Dept: GASTROENTEROLOGY | Facility: MEDICAL CENTER | Age: 45
End: 2020-12-14

## 2020-12-14 ENCOUNTER — TELEMEDICINE (OUTPATIENT)
Dept: GASTROENTEROLOGY | Facility: MEDICAL CENTER | Age: 45
End: 2020-12-14
Payer: COMMERCIAL

## 2020-12-14 DIAGNOSIS — D89.40 MAST CELL ACTIVATION SYNDROME (HCC): ICD-10-CM

## 2020-12-14 DIAGNOSIS — E44.1 MILD PROTEIN-CALORIE MALNUTRITION (HCC): ICD-10-CM

## 2020-12-14 DIAGNOSIS — D84.9 IMMUNOSUPPRESSED STATUS (HCC): ICD-10-CM

## 2020-12-14 DIAGNOSIS — K52.9 INFLAMMATORY BOWEL DISEASE: Primary | ICD-10-CM

## 2020-12-14 PROCEDURE — 1036F TOBACCO NON-USER: CPT | Performed by: INTERNAL MEDICINE

## 2020-12-14 PROCEDURE — 99214 OFFICE O/P EST MOD 30 MIN: CPT | Performed by: INTERNAL MEDICINE

## 2020-12-14 RX ORDER — PREDNISONE 20 MG/1
TABLET ORAL
Qty: 120 TABLET | Refills: 0 | Status: SHIPPED | OUTPATIENT
Start: 2020-12-14 | End: 2021-01-20

## 2020-12-15 ENCOUNTER — TELEPHONE (OUTPATIENT)
Dept: GASTROENTEROLOGY | Facility: MEDICAL CENTER | Age: 45
End: 2020-12-15

## 2020-12-17 ENCOUNTER — HOSPITAL ENCOUNTER (OUTPATIENT)
Dept: INFUSION CENTER | Facility: CLINIC | Age: 45
End: 2020-12-17

## 2020-12-18 ENCOUNTER — HOSPITAL ENCOUNTER (OUTPATIENT)
Dept: INFUSION CENTER | Facility: CLINIC | Age: 45
Discharge: HOME | End: 2020-12-18
Payer: MEDICARE

## 2020-12-18 VITALS
RESPIRATION RATE: 18 BRPM | DIASTOLIC BLOOD PRESSURE: 81 MMHG | HEART RATE: 89 BPM | TEMPERATURE: 98.7 F | SYSTOLIC BLOOD PRESSURE: 117 MMHG

## 2020-12-18 DIAGNOSIS — E44.1 MILD PROTEIN-CALORIE MALNUTRITION (HCC): ICD-10-CM

## 2020-12-18 DIAGNOSIS — R63.0 ANOREXIA: Primary | ICD-10-CM

## 2020-12-18 DIAGNOSIS — K50.119 CROHN'S DISEASE OF COLON WITH COMPLICATION (HCC): ICD-10-CM

## 2020-12-18 PROCEDURE — 96361 HYDRATE IV INFUSION ADD-ON: CPT

## 2020-12-18 PROCEDURE — 96360 HYDRATION IV INFUSION INIT: CPT

## 2020-12-18 RX ADMIN — SODIUM CHLORIDE 1000 ML: 0.9 INJECTION, SOLUTION INTRAVENOUS at 14:58

## 2020-12-22 DIAGNOSIS — K92.89 GASTROINTESTINAL DYSMOTILITY: ICD-10-CM

## 2020-12-22 RX ORDER — BUSPIRONE HYDROCHLORIDE 5 MG/1
TABLET ORAL
Qty: 30 TABLET | Refills: 0 | Status: SHIPPED | OUTPATIENT
Start: 2020-12-22 | End: 2021-01-04

## 2020-12-23 ENCOUNTER — HOSPITAL ENCOUNTER (OUTPATIENT)
Dept: INFUSION CENTER | Facility: CLINIC | Age: 45
Discharge: HOME | End: 2020-12-23
Payer: MEDICARE

## 2020-12-23 VITALS
TEMPERATURE: 98.2 F | HEART RATE: 97 BPM | RESPIRATION RATE: 18 BRPM | SYSTOLIC BLOOD PRESSURE: 125 MMHG | DIASTOLIC BLOOD PRESSURE: 84 MMHG

## 2020-12-23 DIAGNOSIS — E44.1 MILD PROTEIN-CALORIE MALNUTRITION (HCC): ICD-10-CM

## 2020-12-23 DIAGNOSIS — K50.119 CROHN'S DISEASE OF COLON WITH COMPLICATION (HCC): ICD-10-CM

## 2020-12-23 DIAGNOSIS — R63.0 ANOREXIA: Primary | ICD-10-CM

## 2020-12-23 PROCEDURE — 96360 HYDRATION IV INFUSION INIT: CPT

## 2020-12-23 PROCEDURE — 96361 HYDRATE IV INFUSION ADD-ON: CPT

## 2020-12-23 RX ADMIN — SODIUM CHLORIDE 1000 ML: 0.9 INJECTION, SOLUTION INTRAVENOUS at 12:41

## 2020-12-30 ENCOUNTER — OFFICE VISIT (OUTPATIENT)
Dept: SLEEP CENTER | Facility: CLINIC | Age: 45
End: 2020-12-30
Payer: COMMERCIAL

## 2020-12-30 ENCOUNTER — HOSPITAL ENCOUNTER (OUTPATIENT)
Dept: INFUSION CENTER | Facility: CLINIC | Age: 45
Discharge: HOME | End: 2020-12-30
Payer: MEDICARE

## 2020-12-30 VITALS
HEIGHT: 60 IN | SYSTOLIC BLOOD PRESSURE: 114 MMHG | WEIGHT: 150 LBS | DIASTOLIC BLOOD PRESSURE: 78 MMHG | BODY MASS INDEX: 29.45 KG/M2

## 2020-12-30 VITALS
SYSTOLIC BLOOD PRESSURE: 114 MMHG | HEART RATE: 84 BPM | DIASTOLIC BLOOD PRESSURE: 74 MMHG | RESPIRATION RATE: 18 BRPM | TEMPERATURE: 99.1 F

## 2020-12-30 DIAGNOSIS — F51.01 PRIMARY INSOMNIA: Primary | ICD-10-CM

## 2020-12-30 DIAGNOSIS — E44.1 MILD PROTEIN-CALORIE MALNUTRITION (HCC): ICD-10-CM

## 2020-12-30 DIAGNOSIS — R63.0 ANOREXIA: Primary | ICD-10-CM

## 2020-12-30 DIAGNOSIS — K50.119 CROHN'S DISEASE OF COLON WITH COMPLICATION (HCC): ICD-10-CM

## 2020-12-30 PROCEDURE — 3008F BODY MASS INDEX DOCD: CPT | Performed by: INTERNAL MEDICINE

## 2020-12-30 PROCEDURE — 96361 HYDRATE IV INFUSION ADD-ON: CPT

## 2020-12-30 PROCEDURE — 99213 OFFICE O/P EST LOW 20 MIN: CPT | Performed by: INTERNAL MEDICINE

## 2020-12-30 PROCEDURE — 96360 HYDRATION IV INFUSION INIT: CPT

## 2020-12-30 RX ADMIN — SODIUM CHLORIDE 1000 ML: 0.9 INJECTION, SOLUTION INTRAVENOUS at 09:45

## 2020-12-30 NOTE — PROGRESS NOTES
Patient here for IV hydration  Tolerated well  Patient scheduled follow-up appointment, declined AVS  Left infusion center in stable condition with all questions answered

## 2020-12-30 NOTE — PLAN OF CARE
Problem: SAFETY ADULT  Goal: Patient will remain free of falls  Description: INTERVENTIONS:  - Assess patient frequently for physical needs  -  Identify cognitive and physical deficits and behaviors that affect risk of falls    -  [place] fall precautions as indicated by assessment   - Educate patient/family on patient safety including physical limitations  - Instruct patient to call for assistance with activity based on assessment  - Modify environment to reduce risk of injury  - Consider OT/PT consult to assist with strengthening/mobility  Outcome: Progressing

## 2021-01-04 ENCOUNTER — TELEPHONE (OUTPATIENT)
Dept: FAMILY MEDICINE CLINIC | Facility: CLINIC | Age: 46
End: 2021-01-04

## 2021-01-04 DIAGNOSIS — K92.89 GASTROINTESTINAL DYSMOTILITY: ICD-10-CM

## 2021-01-04 RX ORDER — BUSPIRONE HYDROCHLORIDE 5 MG/1
TABLET ORAL
Qty: 30 TABLET | Refills: 0 | Status: SHIPPED | OUTPATIENT
Start: 2021-01-04 | End: 2021-12-07 | Stop reason: HOSPADM

## 2021-01-05 ENCOUNTER — LAB (OUTPATIENT)
Dept: LAB | Facility: HOSPITAL | Age: 46
End: 2021-01-05
Payer: COMMERCIAL

## 2021-01-05 ENCOUNTER — TRANSCRIBE ORDERS (OUTPATIENT)
Dept: ADMINISTRATIVE | Facility: HOSPITAL | Age: 46
End: 2021-01-05

## 2021-01-05 DIAGNOSIS — E03.2 HYPOTHYROIDISM DUE TO MEDICAMENTS AND OTHER EXOGENOUS SUBSTANCES: ICD-10-CM

## 2021-01-05 DIAGNOSIS — M79.7 SCAPULOHUMERAL FIBROSITIS: ICD-10-CM

## 2021-01-05 DIAGNOSIS — E03.2 HYPOTHYROIDISM DUE TO MEDICAMENTS AND OTHER EXOGENOUS SUBSTANCES: Primary | ICD-10-CM

## 2021-01-05 DIAGNOSIS — M79.7 SCAPULOHUMERAL FIBROSITIS: Primary | ICD-10-CM

## 2021-01-05 LAB
ALBUMIN SERPL BCP-MCNC: 3.5 G/DL (ref 3.5–5)
ALP SERPL-CCNC: 50 U/L (ref 46–116)
ALT SERPL W P-5'-P-CCNC: 29 U/L (ref 12–78)
ANION GAP SERPL CALCULATED.3IONS-SCNC: 12 MMOL/L (ref 4–13)
AST SERPL W P-5'-P-CCNC: 14 U/L (ref 5–45)
BACTERIA UR QL AUTO: ABNORMAL /HPF
BASOPHILS # BLD AUTO: 0.07 THOUSANDS/ΜL (ref 0–0.1)
BASOPHILS NFR BLD AUTO: 1 % (ref 0–1)
BILIRUB SERPL-MCNC: 0.2 MG/DL (ref 0.2–1)
BILIRUB UR QL STRIP: NEGATIVE
BUN SERPL-MCNC: 14 MG/DL (ref 5–25)
CALCIUM SERPL-MCNC: 8.9 MG/DL (ref 8.3–10.1)
CHLORIDE SERPL-SCNC: 99 MMOL/L (ref 100–108)
CLARITY UR: CLEAR
CO2 SERPL-SCNC: 24 MMOL/L (ref 21–32)
COLOR UR: YELLOW
CREAT SERPL-MCNC: 0.73 MG/DL (ref 0.6–1.3)
EOSINOPHIL # BLD AUTO: 0.01 THOUSAND/ΜL (ref 0–0.61)
EOSINOPHIL NFR BLD AUTO: 0 % (ref 0–6)
ERYTHROCYTE [DISTWIDTH] IN BLOOD BY AUTOMATED COUNT: 13 % (ref 11.6–15.1)
GFR SERPL CREATININE-BSD FRML MDRD: 98 ML/MIN/1.73SQ M
GLUCOSE SERPL-MCNC: 203 MG/DL (ref 65–140)
GLUCOSE UR STRIP-MCNC: ABNORMAL MG/DL
HCT VFR BLD AUTO: 43.9 % (ref 34.8–46.1)
HGB BLD-MCNC: 13.9 G/DL (ref 11.5–15.4)
HGB UR QL STRIP.AUTO: ABNORMAL
IMM GRANULOCYTES # BLD AUTO: 0.09 THOUSAND/UL (ref 0–0.2)
IMM GRANULOCYTES NFR BLD AUTO: 1 % (ref 0–2)
KETONES UR STRIP-MCNC: NEGATIVE MG/DL
LEUKOCYTE ESTERASE UR QL STRIP: NEGATIVE
LYMPHOCYTES # BLD AUTO: 0.97 THOUSANDS/ΜL (ref 0.6–4.47)
LYMPHOCYTES NFR BLD AUTO: 8 % (ref 14–44)
MCH RBC QN AUTO: 31.3 PG (ref 26.8–34.3)
MCHC RBC AUTO-ENTMCNC: 31.7 G/DL (ref 31.4–37.4)
MCV RBC AUTO: 99 FL (ref 82–98)
MONOCYTES # BLD AUTO: 0.52 THOUSAND/ΜL (ref 0.17–1.22)
MONOCYTES NFR BLD AUTO: 4 % (ref 4–12)
NEUTROPHILS # BLD AUTO: 10.04 THOUSANDS/ΜL (ref 1.85–7.62)
NEUTS SEG NFR BLD AUTO: 86 % (ref 43–75)
NITRITE UR QL STRIP: NEGATIVE
NON-SQ EPI CELLS URNS QL MICRO: ABNORMAL /HPF
NRBC BLD AUTO-RTO: 0 /100 WBCS
PH UR STRIP.AUTO: 5.5 [PH]
PLATELET # BLD AUTO: 432 THOUSANDS/UL (ref 149–390)
PMV BLD AUTO: 8.6 FL (ref 8.9–12.7)
POTASSIUM SERPL-SCNC: 4.2 MMOL/L (ref 3.5–5.3)
PROT SERPL-MCNC: 7.4 G/DL (ref 6.4–8.2)
PROT UR STRIP-MCNC: NEGATIVE MG/DL
RBC # BLD AUTO: 4.44 MILLION/UL (ref 3.81–5.12)
RBC #/AREA URNS AUTO: ABNORMAL /HPF
SODIUM SERPL-SCNC: 135 MMOL/L (ref 136–145)
SP GR UR STRIP.AUTO: 1.02 (ref 1–1.03)
TSH SERPL DL<=0.05 MIU/L-ACNC: 0.73 UIU/ML (ref 0.36–3.74)
UROBILINOGEN UR QL STRIP.AUTO: 0.2 E.U./DL
WBC # BLD AUTO: 11.7 THOUSAND/UL (ref 4.31–10.16)
WBC #/AREA URNS AUTO: ABNORMAL /HPF

## 2021-01-05 PROCEDURE — 36415 COLL VENOUS BLD VENIPUNCTURE: CPT

## 2021-01-05 PROCEDURE — 86200 CCP ANTIBODY: CPT

## 2021-01-05 PROCEDURE — 85025 COMPLETE CBC W/AUTO DIFF WBC: CPT

## 2021-01-05 PROCEDURE — 84439 ASSAY OF FREE THYROXINE: CPT

## 2021-01-05 PROCEDURE — 84481 FREE ASSAY (FT-3): CPT

## 2021-01-05 PROCEDURE — 81001 URINALYSIS AUTO W/SCOPE: CPT

## 2021-01-05 PROCEDURE — 86162 COMPLEMENT TOTAL (CH50): CPT

## 2021-01-05 PROCEDURE — 86430 RHEUMATOID FACTOR TEST QUAL: CPT

## 2021-01-05 PROCEDURE — 86235 NUCLEAR ANTIGEN ANTIBODY: CPT

## 2021-01-05 PROCEDURE — 86225 DNA ANTIBODY NATIVE: CPT

## 2021-01-05 PROCEDURE — 80053 COMPREHEN METABOLIC PANEL: CPT

## 2021-01-05 PROCEDURE — 86160 COMPLEMENT ANTIGEN: CPT

## 2021-01-05 PROCEDURE — 84443 ASSAY THYROID STIM HORMONE: CPT

## 2021-01-05 PROCEDURE — 86038 ANTINUCLEAR ANTIBODIES: CPT

## 2021-01-06 LAB
C3 SERPL-MCNC: 116 MG/DL (ref 90–180)
C4 SERPL-MCNC: 25 MG/DL (ref 10–40)
RHEUMATOID FACT SER QL LA: NEGATIVE
RYE IGE QN: NEGATIVE
T3FREE SERPL-MCNC: 1.88 PG/ML (ref 2.3–4.2)
T4 FREE SERPL-MCNC: 0.84 NG/DL (ref 0.76–1.46)

## 2021-01-07 ENCOUNTER — HOSPITAL ENCOUNTER (OUTPATIENT)
Dept: INFUSION CENTER | Facility: CLINIC | Age: 46
End: 2021-01-07

## 2021-01-07 LAB
CENTROMERE B AB SER-ACNC: <0.2 AI (ref 0–0.9)
CH50 SERPL-ACNC: >60 U/ML
DSDNA AB SER-ACNC: 1 IU/ML (ref 0–9)
ENA RNP AB SER-ACNC: 4.5 AI (ref 0–0.9)
ENA SCL70 AB SER-ACNC: <0.2 AI (ref 0–0.9)
ENA SM AB SER-ACNC: <0.2 AI (ref 0–0.9)
ENA SS-A AB SER-ACNC: <0.2 AI (ref 0–0.9)
ENA SS-B AB SER-ACNC: <0.2 AI (ref 0–0.9)

## 2021-01-08 ENCOUNTER — HOSPITAL ENCOUNTER (OUTPATIENT)
Dept: INFUSION CENTER | Facility: CLINIC | Age: 46
Discharge: HOME | End: 2021-01-08
Payer: COMMERCIAL

## 2021-01-08 VITALS
HEART RATE: 88 BPM | SYSTOLIC BLOOD PRESSURE: 120 MMHG | DIASTOLIC BLOOD PRESSURE: 72 MMHG | RESPIRATION RATE: 18 BRPM | TEMPERATURE: 99.3 F

## 2021-01-08 DIAGNOSIS — R63.0 ANOREXIA: Primary | ICD-10-CM

## 2021-01-08 DIAGNOSIS — E44.1 MILD PROTEIN-CALORIE MALNUTRITION (HCC): ICD-10-CM

## 2021-01-08 DIAGNOSIS — K50.119 CROHN'S DISEASE OF COLON WITH COMPLICATION (HCC): ICD-10-CM

## 2021-01-08 LAB
CCP IGA+IGG SERPL IA-ACNC: 7 UNITS (ref 0–19)
HISTONE IGG SER IA-ACNC: 0.7 UNITS (ref 0–0.9)

## 2021-01-08 PROCEDURE — 96360 HYDRATION IV INFUSION INIT: CPT

## 2021-01-08 PROCEDURE — 96361 HYDRATE IV INFUSION ADD-ON: CPT

## 2021-01-08 RX ADMIN — SODIUM CHLORIDE 1000 ML: 0.9 INJECTION, SOLUTION INTRAVENOUS at 13:00

## 2021-01-11 ENCOUNTER — TELEPHONE (OUTPATIENT)
Dept: GASTROENTEROLOGY | Facility: AMBULARY SURGERY CENTER | Age: 46
End: 2021-01-11

## 2021-01-11 DIAGNOSIS — K50.119 CROHN'S DISEASE OF COLON WITH COMPLICATION (HCC): Primary | ICD-10-CM

## 2021-01-11 DIAGNOSIS — R63.0 ANOREXIA: ICD-10-CM

## 2021-01-11 DIAGNOSIS — E44.1 MILD PROTEIN-CALORIE MALNUTRITION (HCC): ICD-10-CM

## 2021-01-11 NOTE — TELEPHONE ENCOUNTER
Patients GI provider:  Dr Leon Shelter    Number to return call: (  [phone]    Reason for call: [facility] [place] infusion called to have the date changed on the hydration order in [facility]    Scheduled procedure/appointment date if applicable: Apt/procedure 1-13-21

## 2021-01-13 ENCOUNTER — HOSPITAL ENCOUNTER (OUTPATIENT)
Dept: INFUSION CENTER | Facility: CLINIC | Age: 46
Discharge: HOME | End: 2021-01-13
Payer: COMMERCIAL

## 2021-01-13 VITALS
DIASTOLIC BLOOD PRESSURE: 72 MMHG | HEART RATE: 107 BPM | RESPIRATION RATE: 18 BRPM | TEMPERATURE: 99.6 F | SYSTOLIC BLOOD PRESSURE: 106 MMHG

## 2021-01-13 DIAGNOSIS — K50.119 CROHN'S DISEASE OF COLON WITH COMPLICATION (HCC): ICD-10-CM

## 2021-01-13 DIAGNOSIS — E44.1 MILD PROTEIN-CALORIE MALNUTRITION (HCC): ICD-10-CM

## 2021-01-13 DIAGNOSIS — R63.0 ANOREXIA: Primary | ICD-10-CM

## 2021-01-13 LAB
Lab: NORMAL
MISCELLANEOUS LAB TEST RESULT: NORMAL
STONE ANALYSIS-IMP: NORMAL

## 2021-01-13 PROCEDURE — 96361 HYDRATE IV INFUSION ADD-ON: CPT

## 2021-01-13 PROCEDURE — 96360 HYDRATION IV INFUSION INIT: CPT

## 2021-01-13 RX ADMIN — SODIUM CHLORIDE 1000 ML: 0.9 INJECTION, SOLUTION INTRAVENOUS at 11:07

## 2021-01-13 NOTE — PROGRESS NOTES
Pt tolerated IV hydration without incident  PIV removed  AVS declined, pt is aware of her next appt

## 2021-01-18 ENCOUNTER — DOCTOR'S OFFICE (OUTPATIENT)
Dept: URBAN - NONMETROPOLITAN AREA CLINIC 1 | Facility: CLINIC | Age: 46
Setting detail: OPHTHALMOLOGY
End: 2021-01-18
Payer: COMMERCIAL

## 2021-01-18 VITALS — HEIGHT: 55 IN

## 2021-01-18 DIAGNOSIS — H46.8: ICD-10-CM

## 2021-01-18 DIAGNOSIS — H25.13: ICD-10-CM

## 2021-01-18 DIAGNOSIS — H40.003: ICD-10-CM

## 2021-01-18 DIAGNOSIS — H43.813: ICD-10-CM

## 2021-01-18 PROCEDURE — 92134 CPTRZ OPH DX IMG PST SGM RTA: CPT | Performed by: OPHTHALMOLOGY

## 2021-01-18 PROCEDURE — 92014 COMPRE OPH EXAM EST PT 1/>: CPT | Performed by: OPHTHALMOLOGY

## 2021-01-18 PROCEDURE — 92201 OPSCPY EXTND RTA DRAW UNI/BI: CPT | Performed by: OPHTHALMOLOGY

## 2021-01-18 ASSESSMENT — CONFRONTATIONAL VISUAL FIELD TEST (CVF)
OS_FINDINGS: FULL
OD_FINDINGS: FULL

## 2021-01-20 ENCOUNTER — OFFICE VISIT (OUTPATIENT)
Dept: FAMILY MEDICINE CLINIC | Facility: CLINIC | Age: 46
End: 2021-01-20
Payer: COMMERCIAL

## 2021-01-20 ENCOUNTER — TELEPHONE (OUTPATIENT)
Dept: BEHAVIORAL/MENTAL HEALTH CLINIC | Facility: CLINIC | Age: 46
End: 2021-01-20

## 2021-01-20 ENCOUNTER — DOCUMENTATION (OUTPATIENT)
Dept: FAMILY MEDICINE CLINIC | Facility: CLINIC | Age: 46
End: 2021-01-20

## 2021-01-20 VITALS
WEIGHT: 148 LBS | BODY MASS INDEX: 24.66 KG/M2 | DIASTOLIC BLOOD PRESSURE: 80 MMHG | HEIGHT: 65 IN | HEART RATE: 84 BPM | TEMPERATURE: 98.8 F | OXYGEN SATURATION: 99 % | SYSTOLIC BLOOD PRESSURE: 122 MMHG

## 2021-01-20 DIAGNOSIS — D89.40 MAST CELL ACTIVATION SYNDROME (HCC): ICD-10-CM

## 2021-01-20 DIAGNOSIS — G35 MS (MULTIPLE SCLEROSIS) (HCC): ICD-10-CM

## 2021-01-20 DIAGNOSIS — Z12.31 ENCOUNTER FOR SCREENING MAMMOGRAM FOR BREAST CANCER: ICD-10-CM

## 2021-01-20 DIAGNOSIS — Z79.52 CURRENT CHRONIC USE OF SYSTEMIC STEROIDS: ICD-10-CM

## 2021-01-20 DIAGNOSIS — R00.2 PALPITATION: Primary | ICD-10-CM

## 2021-01-20 DIAGNOSIS — G43.909 MIGRAINE WITHOUT STATUS MIGRAINOSUS, NOT INTRACTABLE, UNSPECIFIED MIGRAINE TYPE: ICD-10-CM

## 2021-01-20 DIAGNOSIS — Z12.31 ENCOUNTER FOR SCREENING MAMMOGRAM FOR MALIGNANT NEOPLASM OF BREAST: ICD-10-CM

## 2021-01-20 DIAGNOSIS — R00.2 PALPITATION: ICD-10-CM

## 2021-01-20 PROBLEM — E66.3 OVERWEIGHT WITH BODY MASS INDEX (BMI) OF 29 TO 29.9 IN ADULT: Status: ACTIVE | Noted: 2021-01-20

## 2021-01-20 PROCEDURE — 3008F BODY MASS INDEX DOCD: CPT | Performed by: INTERNAL MEDICINE

## 2021-01-20 PROCEDURE — 99213 OFFICE O/P EST LOW 20 MIN: CPT | Performed by: FAMILY MEDICINE

## 2021-01-20 RX ORDER — CYANOCOBALAMIN 1000 UG/ML
INJECTION INTRAMUSCULAR; SUBCUTANEOUS
COMMUNITY
Start: 2021-01-05

## 2021-01-20 RX ORDER — BUTALBITAL, ACETAMINOPHEN AND CAFFEINE 50; 325; 40 MG/1; MG/1; MG/1
1 TABLET ORAL EVERY 8 HOURS PRN
Qty: 30 TABLET | Refills: 0 | Status: CANCELLED | OUTPATIENT
Start: 2021-01-20

## 2021-01-20 RX ORDER — USTEKINUMAB 90 MG/ML
INJECTION, SOLUTION SUBCUTANEOUS
COMMUNITY
Start: 2020-12-22 | End: 2022-01-24 | Stop reason: SDUPTHER

## 2021-01-20 RX ORDER — IBUPROFEN 200 MG
1 CAPSULE ORAL DAILY
Qty: 90 TABLET | Refills: 3 | Status: SHIPPED | OUTPATIENT
Start: 2021-01-20 | End: 2021-12-07 | Stop reason: HOSPADM

## 2021-01-20 RX ORDER — BUTALBITAL, ACETAMINOPHEN AND CAFFEINE 50; 325; 40 MG/1; MG/1; MG/1
1 TABLET ORAL EVERY 8 HOURS PRN
Qty: 20 TABLET | Refills: 0 | Status: SHIPPED | OUTPATIENT
Start: 2021-01-20 | End: 2021-02-16 | Stop reason: SDUPTHER

## 2021-01-20 RX ORDER — LEVOTHYROXINE SODIUM 0.05 MG/1
50 TABLET ORAL DAILY
COMMUNITY
End: 2022-04-22 | Stop reason: ALTCHOICE

## 2021-01-20 RX ORDER — DICLOFENAC SODIUM 75 MG/1
75 TABLET, DELAYED RELEASE ORAL 2 TIMES DAILY
Qty: 42 TABLET | Refills: 2 | Status: SHIPPED | OUTPATIENT
Start: 2021-01-20 | End: 2021-12-27

## 2021-01-20 RX ORDER — LIOTHYRONINE SODIUM 5 UG/1
5 TABLET ORAL DAILY
COMMUNITY

## 2021-01-20 RX ORDER — ERGOCALCIFEROL 1.25 MG/1
CAPSULE ORAL
COMMUNITY
Start: 2021-01-05

## 2021-01-20 RX ORDER — CROMOLYN SODIUM 100 MG/5ML
SOLUTION, CONCENTRATE ORAL
COMMUNITY
Start: 2020-11-16 | End: 2021-12-07 | Stop reason: HOSPADM

## 2021-01-20 RX ORDER — HYDROXYZINE HYDROCHLORIDE 25 MG/1
25 TABLET, FILM COATED ORAL 4 TIMES DAILY
COMMUNITY
Start: 2020-11-16

## 2021-01-20 RX ORDER — PREDNISONE 20 MG/1
40 TABLET ORAL DAILY
Status: ON HOLD | COMMUNITY
End: 2021-03-12 | Stop reason: SDUPTHER

## 2021-01-20 NOTE — PROGRESS NOTES
Assessment/Plan:    Current chronic use of systemic steroids  Possible dx of MS (more likely) vs SLE vs Sjogren; is currently undergoing systemic steroid treatment and therefore DEXA scan was ordered, ordered Ca and is already on Vit D, sees gi and can not take Protonix, declined immunizations d/t allergic reactions, sees gyn (has a hx of hysterectomy with cervical removal), appointment with dentistry for possible dental caries d/t Sjogren, she had a colonoscopy done in Oct 2020, mammogram ordered for this year    Mast cell activation syndrome [facility])  Has an appointment with a new allergist immunologist that specializes in mast cell activation syndrome  Migraine  Refilled Fioricet, ordered Voltaren and B2 400 mg qd for prevention     MS (multiple sclerosis) ([facility] )  Is being followed by an [facility] specialist, Rheumatologist and Neurologist  Has an appoitment with ophthalmology for visual field testing        Diagnoses and all orders for this visit:    Palpitation    Encounter for screening mammogram for malignant neoplasm of breast  -     Mammo screening bilateral w 3d & cad; Future    Migraine without status migrainosus, not intractable, unspecified migraine type  -     diclofenac (VOLTAREN) 75 mg EC tablet; Take 1 tablet (75 mg total) by mouth 2 (two) times a day for 21 days  -     Riboflavin (Vitamin B-2) 100 MG TABS; Take 4 tablets (400 mg total) by mouth daily    Current chronic use of systemic steroids  -     DXA bone density spine hip and pelvis; Future  -     calcium citrate (CALCITRATE) 950 (200 Ca) MG tablet; Take 1 tablet (950 mg total) by mouth daily    Encounter for screening mammogram for breast cancer  -     Mammo screening bilateral w 3d & cad; Future    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HCC)    MS (multiple sclerosis) ([facility] )    Other orders  -     predniSONE 20 mg tablet; Take by mouth daily  -     ergocalciferol (VITAMIN D2) 50,000 units  -     levothyroxine 50 mcg tablet;  Take 50 mcg by mouth daily  - liothyronine (CYTOMEL) 5 mcg tablet; Take 5 mcg by mouth daily  -     hydrOXYzine HCL (ATARAX) 25 mg tablet  -     cromolyn (GASTROCROM) 100 MG/5ML solution;   Start: 11/16/20 8:54:00 EST, only when in [place] 2 vials bid  -     NON FORMULARY; immunoglobin sq 6g 30 ml once a week  -     Stelara 90 MG/ML subcutaneous injection  -     cyanocobalamin 1,000 mcg/mL  -     Discontinue: DICLOFENAC POTASSIUM,MIGRAINE, PO  -     Cancel: butalbital-acetaminophen-caffeine (FIORICET,ESGIC) -40 mg per tablet; Take 1 tablet by mouth every 8 (eight) hours as needed for headaches or migraine      Is unable to receive vaccines d/t hx of reactions to vaccines previously    All messages are to be forwarded to me from my chart    Luis Leavitt is to [facility] in 1 month for f/u  She understood, acknowledged and agreed to the plan above  All her questions and concerns were answered and addressed  Case discussed with Dr De Souza Later    Subjective:      Patient ID: Gila Harrison is a 50 y o  female  Luis Leavitt is a 49 y/o woman that was seen and examined in the office today for f/u  She was last seen on 11/03/2020 for TCM  Since then she has been started on Stelara and attended multiple specialists appointments  She had a LP done that showed oligoclonal bands that was concerning for MS with a DDx of SLE and Sjogrens; She had severe HA d/t LP and blood patch was given  She has an MRI scheduled for next month along with neuropscyh  She wasn't able to complete the CT d/t pain  She states that she has been seen by ophthalmology in the past who had suggested that she had MS back in 2004, however there was no follow through  She states that she has had Right eye vision changes that is suggestive of MS and is being followed by an ophthalmologist for which she has an appointment with them next week for visual field testing        She also has an appointment with a new allergist/ immunologist with specialty in mast cell activation on 03/08/21      The following portions of the patient's history were reviewed and updated as appropriate: allergies, current medications, past family history, past medical history, past social history, past surgical history and problem list     Review of Systems   All other systems reviewed and are negative  Objective:      /80   Pulse 84   Temp 98 8 °F (37 1 °C) (Tympanic)   Ht 5' 5 25" (1 657 m)   Wt 67 1 kg (148 lb)   SpO2 99%   BMI 24 44 kg/m²          Physical Exam  Vitals signs and nursing note reviewed  Constitutional:       Appearance: Normal appearance  She is obese  HENT:      Head: Normocephalic and atraumatic  Neck:      Musculoskeletal: Normal range of motion and neck supple  Cardiovascular:      Rate and Rhythm: Normal rate and regular rhythm  Pulses: Normal pulses  Heart sounds: Normal heart sounds  Pulmonary:      Effort: Pulmonary effort is normal       Breath sounds: Normal breath sounds  Abdominal:      General: Abdomen is flat  Bowel sounds are normal       Palpations: Abdomen is soft  Tenderness: There is no right CVA tenderness or left CVA tenderness  Musculoskeletal: Normal range of motion  Right lower leg: No edema  Left lower leg: No edema  Skin:     General: Skin is warm  Capillary Refill: Capillary refill takes less than 2 seconds  Neurological:      General: No focal deficit present  Mental Status: She is alert and oriented to person, place, and time

## 2021-01-20 NOTE — TELEPHONE ENCOUNTER
Attempted to contact the Client to schedule an appointment message was left on her voicemail to contact this therapist to schedule an appointment   Phone number for Jack Casey was left [phone]

## 2021-01-21 ENCOUNTER — HOSPITAL ENCOUNTER (OUTPATIENT)
Dept: INFUSION CENTER | Facility: CLINIC | Age: 46
Discharge: HOME | End: 2021-01-21
Payer: COMMERCIAL

## 2021-01-21 VITALS
DIASTOLIC BLOOD PRESSURE: 80 MMHG | TEMPERATURE: 97.9 F | SYSTOLIC BLOOD PRESSURE: 129 MMHG | HEART RATE: 92 BPM | RESPIRATION RATE: 18 BRPM

## 2021-01-21 DIAGNOSIS — K50.119 CROHN'S DISEASE OF COLON WITH COMPLICATION (HCC): ICD-10-CM

## 2021-01-21 DIAGNOSIS — E44.1 MILD PROTEIN-CALORIE MALNUTRITION (HCC): ICD-10-CM

## 2021-01-21 DIAGNOSIS — R63.0 ANOREXIA: Primary | ICD-10-CM

## 2021-01-21 PROCEDURE — 96361 HYDRATE IV INFUSION ADD-ON: CPT

## 2021-01-21 PROCEDURE — 96360 HYDRATION IV INFUSION INIT: CPT

## 2021-01-21 RX ADMIN — SODIUM CHLORIDE 1000 ML: 0.9 INJECTION, SOLUTION INTRAVENOUS at 11:49

## 2021-01-21 NOTE — PROGRESS NOTES
Patient arrived on unit for hydration  Denies any present issues/discomforts  Tolerated infusion without incident  Aware of next appointment   Declined AVS

## 2021-01-22 ASSESSMENT — VISUAL ACUITY
OD_BCVA: 20/25
OS_BCVA: 20/25

## 2021-01-28 ENCOUNTER — TRANSCRIBE ORDERS (OUTPATIENT)
Dept: ADMINISTRATIVE | Facility: HOSPITAL | Age: 46
End: 2021-01-28

## 2021-01-28 ENCOUNTER — APPOINTMENT (OUTPATIENT)
Dept: LAB | Facility: HOSPITAL | Age: 46
End: 2021-01-28
Payer: COMMERCIAL

## 2021-01-28 DIAGNOSIS — G62.9 PERIPHERAL NERVE DISORDER: Primary | ICD-10-CM

## 2021-01-28 DIAGNOSIS — E55.9 AVITAMINOSIS D: ICD-10-CM

## 2021-01-28 DIAGNOSIS — G62.9 PERIPHERAL NERVE DISORDER: ICD-10-CM

## 2021-01-28 PROBLEM — Z79.52 CURRENT CHRONIC USE OF SYSTEMIC STEROIDS: Status: ACTIVE | Noted: 2021-01-28

## 2021-01-28 PROBLEM — G35 MS (MULTIPLE SCLEROSIS) (HCC): Status: ACTIVE | Noted: 2021-01-28

## 2021-01-28 LAB
ALBUMIN SERPL BCP-MCNC: 3.7 G/DL (ref 3.5–5)
ALP SERPL-CCNC: 53 U/L (ref 46–116)
ALT SERPL W P-5'-P-CCNC: 25 U/L (ref 12–78)
ANION GAP SERPL CALCULATED.3IONS-SCNC: 9 MMOL/L (ref 4–13)
AST SERPL W P-5'-P-CCNC: 17 U/L (ref 5–45)
BASOPHILS # BLD AUTO: 0.03 THOUSANDS/ΜL (ref 0–0.1)
BASOPHILS NFR BLD AUTO: 0 % (ref 0–1)
BILIRUB SERPL-MCNC: 0.2 MG/DL (ref 0.2–1)
BUN SERPL-MCNC: 11 MG/DL (ref 5–25)
CALCIUM SERPL-MCNC: 9.1 MG/DL (ref 8.3–10.1)
CHLORIDE SERPL-SCNC: 104 MMOL/L (ref 100–108)
CO2 SERPL-SCNC: 25 MMOL/L (ref 21–32)
CREAT SERPL-MCNC: 0.93 MG/DL (ref 0.6–1.3)
EOSINOPHIL # BLD AUTO: 0 THOUSAND/ΜL (ref 0–0.61)
EOSINOPHIL NFR BLD AUTO: 0 % (ref 0–6)
ERYTHROCYTE [DISTWIDTH] IN BLOOD BY AUTOMATED COUNT: 12.9 % (ref 11.6–15.1)
GFR SERPL CREATININE-BSD FRML MDRD: 73 ML/MIN/1.73SQ M
GLUCOSE SERPL-MCNC: 164 MG/DL (ref 65–140)
HCT VFR BLD AUTO: 43.8 % (ref 34.8–46.1)
HGB BLD-MCNC: 13.9 G/DL (ref 11.5–15.4)
IMM GRANULOCYTES # BLD AUTO: 0.02 THOUSAND/UL (ref 0–0.2)
IMM GRANULOCYTES NFR BLD AUTO: 0 % (ref 0–2)
LYMPHOCYTES # BLD AUTO: 0.44 THOUSANDS/ΜL (ref 0.6–4.47)
LYMPHOCYTES NFR BLD AUTO: 6 % (ref 14–44)
MCH RBC QN AUTO: 31.1 PG (ref 26.8–34.3)
MCHC RBC AUTO-ENTMCNC: 31.7 G/DL (ref 31.4–37.4)
MCV RBC AUTO: 98 FL (ref 82–98)
MONOCYTES # BLD AUTO: 0.08 THOUSAND/ΜL (ref 0.17–1.22)
MONOCYTES NFR BLD AUTO: 1 % (ref 4–12)
NEUTROPHILS # BLD AUTO: 7.18 THOUSANDS/ΜL (ref 1.85–7.62)
NEUTS SEG NFR BLD AUTO: 93 % (ref 43–75)
NRBC BLD AUTO-RTO: 0 /100 WBCS
PLATELET # BLD AUTO: 451 THOUSANDS/UL (ref 149–390)
PMV BLD AUTO: 8.7 FL (ref 8.9–12.7)
POTASSIUM SERPL-SCNC: 4.1 MMOL/L (ref 3.5–5.3)
PROT SERPL-MCNC: 7.7 G/DL (ref 6.4–8.2)
RBC # BLD AUTO: 4.47 MILLION/UL (ref 3.81–5.12)
SODIUM SERPL-SCNC: 138 MMOL/L (ref 136–145)
TSH SERPL DL<=0.05 MIU/L-ACNC: 0.68 UIU/ML (ref 0.36–3.74)
WBC # BLD AUTO: 7.75 THOUSAND/UL (ref 4.31–10.16)

## 2021-01-28 PROCEDURE — 84481 FREE ASSAY (FT-3): CPT

## 2021-01-28 PROCEDURE — 84439 ASSAY OF FREE THYROXINE: CPT

## 2021-01-28 PROCEDURE — 82306 VITAMIN D 25 HYDROXY: CPT

## 2021-01-28 PROCEDURE — 85025 COMPLETE CBC W/AUTO DIFF WBC: CPT

## 2021-01-28 PROCEDURE — 86800 THYROGLOBULIN ANTIBODY: CPT

## 2021-01-28 PROCEDURE — 82607 VITAMIN B-12: CPT

## 2021-01-28 PROCEDURE — 36415 COLL VENOUS BLD VENIPUNCTURE: CPT

## 2021-01-28 PROCEDURE — 80053 COMPREHEN METABOLIC PANEL: CPT

## 2021-01-28 PROCEDURE — 83036 HEMOGLOBIN GLYCOSYLATED A1C: CPT

## 2021-01-28 PROCEDURE — 84443 ASSAY THYROID STIM HORMONE: CPT

## 2021-01-28 NOTE — ASSESSMENT & PLAN NOTE
Possible dx of MS (more likely) vs SLE vs Sjogren; is currently undergoing systemic steroid treatment and therefore DEXA scan was ordered, ordered Ca and is already on Vit D, sees gi and can not take Protonix, declined immunizations d/t allergic reactions, sees gyn (has a hx of hysterectomy with cervical removal), appointment with dentistry for possible dental caries d/t Sjogren, she had a colonoscopy done in Oct 2020, mammogram ordered for this year

## 2021-01-28 NOTE — ASSESSMENT & PLAN NOTE
Has an appointment with a new allergist immunologist that specializes in mast cell activation syndrome

## 2021-01-29 ENCOUNTER — HOSPITAL ENCOUNTER (OUTPATIENT)
Dept: INFUSION CENTER | Facility: CLINIC | Age: 46
Discharge: HOME | End: 2021-01-29
Payer: COMMERCIAL

## 2021-01-29 VITALS
SYSTOLIC BLOOD PRESSURE: 127 MMHG | RESPIRATION RATE: 18 BRPM | HEART RATE: 79 BPM | TEMPERATURE: 98.5 F | DIASTOLIC BLOOD PRESSURE: 87 MMHG

## 2021-01-29 DIAGNOSIS — E44.1 MILD PROTEIN-CALORIE MALNUTRITION (HCC): ICD-10-CM

## 2021-01-29 DIAGNOSIS — R63.0 ANOREXIA: Primary | ICD-10-CM

## 2021-01-29 DIAGNOSIS — K50.119 CROHN'S DISEASE OF COLON WITH COMPLICATION (HCC): ICD-10-CM

## 2021-01-29 LAB
25(OH)D3 SERPL-MCNC: 52.6 NG/ML (ref 30–100)
EST. AVERAGE GLUCOSE BLD GHB EST-MCNC: 105 MG/DL
HBA1C MFR BLD: 5.3 %
T3FREE SERPL-MCNC: 1.13 PG/ML (ref 2.3–4.2)
T4 FREE SERPL-MCNC: 0.82 NG/DL (ref 0.76–1.46)
VIT B12 SERPL-MCNC: 579 PG/ML (ref 100–900)

## 2021-01-29 PROCEDURE — 96360 HYDRATION IV INFUSION INIT: CPT

## 2021-01-29 PROCEDURE — 96361 HYDRATE IV INFUSION ADD-ON: CPT

## 2021-01-29 RX ADMIN — SODIUM CHLORIDE 1000 ML: 0.9 INJECTION, SOLUTION INTRAVENOUS at 10:14

## 2021-01-29 NOTE — PROGRESS NOTES
Patient arrived to the unit and denied infection  She tolerated her hydration well without adverse reaction

## 2021-01-29 NOTE — ASSESSMENT & PLAN NOTE
Is being followed by an MS specialist, Rheumatologist and Neurologist  Has an appoitment with ophthalmology for visual field testing

## 2021-01-29 NOTE — PLAN OF CARE
Problem: INFECTION - ADULT  Goal: Absence or prevention of progression during hospitalization  Description: INTERVENTIONS:  - Assess and monitor for signs and symptoms of infection  - Monitor lab/diagnostic results  - Monitor all insertion sites, i e  indwelling lines, tubes, and drains  - Monitor endotracheal if appropriate and nasal secretions for changes in amount and color  - [place] appropriate cooling/warming therapies per order  - Administer medications as ordered  - Instruct and encourage patient and family to use good hand hygiene technique  - Identify and instruct in appropriate isolation precautions for identified infection/condition  Outcome: Progressing

## 2021-01-30 LAB — THYROGLOB AB SERPL-ACNC: 3 IU/ML (ref 0–0.9)

## 2021-02-02 ENCOUNTER — TELEPHONE (OUTPATIENT)
Dept: OTHER | Facility: OTHER | Age: 46
End: 2021-02-02

## 2021-02-02 DIAGNOSIS — F41.9 ANXIETY: ICD-10-CM

## 2021-02-02 NOTE — TELEPHONE ENCOUNTER
Pt is calling in to schedule bone density testing  Pt advised to contact office tomorrow while it is open for additional help

## 2021-02-03 ENCOUNTER — TELEPHONE (OUTPATIENT)
Dept: GASTROENTEROLOGY | Facility: CLINIC | Age: 46
End: 2021-02-03

## 2021-02-03 ENCOUNTER — PREP FOR PROCEDURE (OUTPATIENT)
Dept: GASTROENTEROLOGY | Facility: MEDICAL CENTER | Age: 46
End: 2021-02-03

## 2021-02-03 ENCOUNTER — TELEPHONE (OUTPATIENT)
Dept: GASTROENTEROLOGY | Facility: AMBULARY SURGERY CENTER | Age: 46
End: 2021-02-03

## 2021-02-03 DIAGNOSIS — D84.9 IMMUNOSUPPRESSED STATUS (HCC): ICD-10-CM

## 2021-02-03 DIAGNOSIS — K50.119 CROHN'S DISEASE OF COLON WITH COMPLICATION (HCC): Primary | ICD-10-CM

## 2021-02-03 NOTE — TELEPHONE ENCOUNTER
Called and spoke to the patient  She relayed the symptoms as mentioned in earlier message  She feels that a sitting position makes it worse, but so does prolonged standing  She feels like the stelara did not help  Last stelara injection 12/31  The past 2 days she had diarrhea on and off (small amounts at a time)       She wonders if this is recurrence of sphinchter of oddi dysfunction    Plan:  Flex sig  Will check Stelara level and check CRP  Continue prednisone for now, and taper

## 2021-02-03 NOTE — TELEPHONE ENCOUNTER
The patient is scheduled at [facility] for a flex sig with Antwan Miramontes on 02/08/2021  prep instructions have been gone over  And emailed to the patient  The patient is aware that they will receive a call with the arrival time the day prior to procedure and that they will need a  the day of the procedure   I have asked the patient to call with any questions that they might have prior to procedure

## 2021-02-03 NOTE — TELEPHONE ENCOUNTER
Patients GI provider:  Dr Hollingsworth Royalty  Number to return call: ([phone]    Reason for call: Pt calling because she is currently having ruq pain 7/10 along with blood in her stool       Scheduled procedure/appointment date if applicable: 8/22/07

## 2021-02-03 NOTE — TELEPHONE ENCOUNTER
Patient of Dr Jenn Wayne, last seen 12/14/20    History of indeterminate IBD, mast cell activation syndrome, drug induced lupus    Called and spoke with patient  She states she has been having ongoing symptoms that she has been discussing with Dr Jenn Wayne  She complains of RUQ pain, irregular bowels (most recently diarrhea), and is now having rectal bleeding  Currently taking 20 mg of prednisone daily  She has had the rectal bleeding going on for a few days now, several times per day and feels weak and tired  I advised patient go to ED and she became very upset  She states she was just there on 1/12 (she went to [facility] ED), and states that every time she goes to the ED they do nothing for her because they say it is not an emergency  She wants to discuss with Dr Jenn Wayne  I do see he messaged her back on 1/29 to set up virtual  I told her I will reach out to him to see when he is available to talk, but advised if she is feeling weak and having rectal bleeding she may need ED evaluation  Patient states she just needs to speak to Dr Jenn Wayne   Will forward message

## 2021-02-03 NOTE — TELEPHONE ENCOUNTER
Pt called to cx sigmoid on 02/08/21  Will discuss further at f/u appt  Does not want to reschedule at this time    Please cx

## 2021-02-04 ENCOUNTER — HOSPITAL ENCOUNTER (OUTPATIENT)
Dept: INFUSION CENTER | Facility: CLINIC | Age: 46
Discharge: HOME | End: 2021-02-04
Payer: COMMERCIAL

## 2021-02-04 VITALS
RESPIRATION RATE: 18 BRPM | HEART RATE: 97 BPM | DIASTOLIC BLOOD PRESSURE: 79 MMHG | TEMPERATURE: 98.6 F | SYSTOLIC BLOOD PRESSURE: 116 MMHG

## 2021-02-04 DIAGNOSIS — E44.1 MILD PROTEIN-CALORIE MALNUTRITION (HCC): ICD-10-CM

## 2021-02-04 DIAGNOSIS — R63.0 ANOREXIA: Primary | ICD-10-CM

## 2021-02-04 DIAGNOSIS — K50.119 CROHN'S DISEASE OF COLON WITH COMPLICATION (HCC): ICD-10-CM

## 2021-02-04 PROCEDURE — 96361 HYDRATE IV INFUSION ADD-ON: CPT

## 2021-02-04 PROCEDURE — 96360 HYDRATION IV INFUSION INIT: CPT

## 2021-02-04 RX ORDER — LORAZEPAM 1 MG/1
1 TABLET ORAL EVERY 8 HOURS PRN
Qty: 21 TABLET | Refills: 0 | Status: SHIPPED | OUTPATIENT
Start: 2021-02-04

## 2021-02-04 RX ADMIN — SODIUM CHLORIDE 1000 ML: 0.9 INJECTION, SOLUTION INTRAVENOUS at 13:47

## 2021-02-04 NOTE — PLAN OF CARE
Problem: Potential for Falls  Goal: Patient will remain free of falls  Description: INTERVENTIONS:  - Assess patient frequently for physical needs  -  Identify cognitive and physical deficits and behaviors that affect risk of falls    -  [place] fall precautions as indicated by assessment   - Educate patient/family on patient safety including physical limitations  - Instruct patient to call for assistance with activity based on assessment  - Modify environment to reduce risk of injury  - Consider OT/PT consult to assist with strengthening/mobility  Outcome: Progressing

## 2021-02-04 NOTE — PROGRESS NOTES
Patient arrived on unit for hydration  Denies any present issues/discomforts  Tolerating infusion without incident  Aware of next appointment   Declines AVS

## 2021-02-05 ENCOUNTER — SOCIAL WORK (OUTPATIENT)
Dept: BEHAVIORAL/MENTAL HEALTH CLINIC | Facility: CLINIC | Age: 46
End: 2021-02-05
Payer: COMMERCIAL

## 2021-02-05 DIAGNOSIS — F41.1 GENERALIZED ANXIETY DISORDER: Primary | ICD-10-CM

## 2021-02-05 NOTE — PSYCH
Psychotherapy Provided: Individual Psychotherapy 60  Minutes 09:30 to 11:00 Am          Goals addressed in session:(Intake) The Client reported a long history of physical health issues which has effected her different relationship and environment, and has caused anxiety " My health has caused my anxiety " During the session we conducted screenings on her depression, anxiety, trauma  bipolar, D&A and suicide  It is hoped that by addressing her weaknesses this will act as a preventive measure against the possible need for higher level of care and services  Interventions: Supportive Therapy, Strengths Therapy,  and Cognitive Behavioral Therapy where the treatment modalities utilized during the session  Assessment and Plan:The client presented a depressed anxious mood that was congruent in effect  She was alert, goal directed and participated with prompts during the session  The Client was oriented to person, place, time, situation, and reported no suicidal/homicidal ideation, plan, or intent  As team we conducted the depression screening PHQ-9 with the Client scoring in the mild depression severity range, and on the anxiety screening INDERJIT-7 the Client scored in the moderate anxiety severity screening range  On the trauma screenings PCL-5 with LEC and criteria A the Client had a negative screening  During the session the CIDI based bipolar screening scale was conducted an the client had a low risk for positive endorsement for bipolar  She had a negative screening for D&A usage on the LAUREL and AUDIT-C as well as a negative screening on the suicide screening tool Ask suicide questions  As her home commitment the Client will complete worksheet on 3 long term and 3 short term goals  Next scheduled appointment was set for 2 weeks       Pain:      PSYCH MENTAL STATUS PAIN :5    PHYSICAL PAIN SCALE NUMBERS:5    Current suicide risk : Low/Phone number for [facility] was given  to the Client [phone]  The Client was given phone number for the [facility] [phone]  Behavioral Health Treatment Plan [facility]: Diagnosis and Treatment Plan explained to Nicola Hernandez, Nicola Hernandez  relates understanding diagnosis and is agreeable to Treatment Plan  Yes

## 2021-02-05 NOTE — PSYCH
Assessment/Plan: The  Client will be seen bi-monthly to addresses her depression and anxiety surrounding her physical and mental health  During the session the therapeutic interventions that will be used, but not limited to will be Supportive Therapy, Strengths Therapy, Solutions Focused and Cognitive Behavioral Therapy  It is hoped that by addressing her weaknesses this will prevent the need for higher level of care and services  Diagnoses and all orders for this visit:    Generalized anxiety disorder          Subjective:      Patient ID: Denver Dach is a 50 y o  female, who was referred by her PCP through [facility]  The Client also reported excessive anxiety and worry occurring more days than not, which have lasted greater than 6 months  She reported problems controlling her worry, feeling keyed up or on edge, irritability, and muscle tension  This anxiety has caused significant distress and impairment in social occupation and other important areas of functioning  The Client was given the  diagnosis of General Anxiety Disorder (F41 1)    HPI:     Pre-morbid level of function and History of Present Illness: The Client reported her anxiety started when her physical health decrased     Previous Psychiatric/psychological treatment/year: Dr Javier Villarreal saw for 1 year 2015  Current Psychiatrist/Therapist: Dr Aye Miner 2018-[facility], and as of this writing  Gordon Azar MSW LCSW at [facility]  Outpatient and/or Partial and Other Community Resources Used (CTT, ICM, VNA): None reported      Problem Assessment:     SOCIAL/VOCATION:  Family Constellation (include parents, relationship with each and pertinent Psych/Medical History):     Family History   Problem Relation Age of Onset    Ulcerative colitis Mother     Heart failure Father     Neuropathy Father     Macular degeneration Father     Diabetes Father     Asthma Daughter     Hypothyroidism Daughter     Heart disease Maternal Grandmother     Arthritis Maternal Grandmother     Arthritis Paternal Grandmother     Macular degeneration Paternal Grandmother     Lymphoma Paternal Grandfather     Heart failure Paternal Aunt     Heart failure Paternal [de-identified]     Breast cancer Paternal Aunt 47    Breast cancer additional onset Paternal Aunt 62    Hypothyroidism Paternal Aunt     Breast cancer Maternal Aunt     No Known Problems Maternal Grandfather        Mother: Brenda Tena-"Difficult relationship" No MH or D&A history reported  Spouse: Armando-Difficult relationship No history of D&A reported, Client reported undiagnosed depression  Father: -Good relationship-No MH or D&A history reported  Children: Charmaine Good relationship-No MH or D&A history reported-recent dx of hypothyroidism  Sibling: Raudel-Good relationship growing upNo MH or D&A history reported  Children: Bennett-Good relationship-No MH or D&A history reported  Children: Eusebio-Good relations hip-history of Autism    Krystian Camilo relates best to [place]  she lives with her  and children  she does not live alone  Domestic Violence: No past history of domestic violence    Additional Comments related to family/relationships/peer support: The client reported   School or Work History (strengths/limitations/needs): [employer]  and [employer] worked in the service and architectural industry  Presently on SSDI    Her highest grade level achieved was 2 years post secondary      history includes: N/A    Financial status includes Client is on SSDI, and her Partner works as an educator  LEISURE ASSESSMENT (Include past and present hobbies/interests and level of involvement (Ex: Group/Club Affiliations): spending time with her family  her primary language is "English" Preferred language is"English"  Ethnic considerations are  Religions affiliations and level of involvement Protestant   Does spirituality help you cope? YES    FUNCTIONAL STATUS: There has been a recent change in Mala ability to do the following: does not need can service/Client's family does help if needed due to her physcial health issues    Level of Assistance Needed/By Whom?: Family members-Daughter and  if needed    Mala learns best by  reading and listening    SUBSTANCE ABUSE ASSESSMENT: no substance abuse    Substance/Route/Age/Amount/Frequency/Last Use: N/A    DETOX HISTORY: N/A    Previous detox/rehab treatment: N/A    HEALTH ASSESSMENT: no referral to PCP needed/Client is being seeing for her primary health care    LEGAL: None reported      Risk Assessment:   The following ratings are based on my interview(s) with the client and completion of the screening Ask suicide screening    Risk of Harm to Self:   Demographic risk factors include   Historical Risk Factors include n/a  Recent Specific Risk Factors include unable to visualize a realistic positive future and chronic pain or health problems  Additional Factors for a Child or Adolescent n/a    Risk of Harm to Others:   Demographic Risk Factors include unemployed  Historical Risk Factors include n/a  Recent Specific Risk Factors include multiple stressors    Access to Weapons:   Mala has access to the following weapons: none reported The following steps have been taken to ensure weapons are properly secured: N/A    Based on the above information, the client presents the following risk of harm to self or others:  LOW    The following interventions are recommended:   no intervention changes    Notes regarding this Risk Assessment: Phone number for [facility] was given  to the Client [phone]  The Client was given phone number for the [facility] [phone]               Review Of Systems:         Mental status:  Appearance adequate hygiene and grooming, restless and fidgety and good eye contact    Mood anxious   Affect affect was tearful and affect appropriate    Speech a normal rate   Thought Processes coherent/organized and normal thought processes   Hallucinations no hallucinations present    Thought Content no delusions   Abnormal Thoughts somatic preoccupation, no suicidal thoughts  and no homicidal thoughts    Orientation  oriented to person, oriented to place and oriented to time   Remote Memory short term memory intact and long term memory intact   Attention Span concentration intact   Intellect Appears to be of Average Intelligence   Fund of Knowledge displays adequate knowledge of current events, adequate fund of knowledge regarding past history and adequate fund of knowledge regarding vocabulary    Insight Insight intact   Judgement judgment was intact   Muscle Strength Normal gait    Language no difficulty naming common objects, no difficulty repeating a phrase  and no difficulty writing a sentence    Pain moderate to severe   Pain Scale 5

## 2021-02-08 DIAGNOSIS — F51.04 PSYCHOPHYSIOLOGICAL INSOMNIA: ICD-10-CM

## 2021-02-08 RX ORDER — TEMAZEPAM 30 MG/1
30 CAPSULE ORAL
Qty: 30 CAPSULE | Refills: 5 | Status: SHIPPED | OUTPATIENT
Start: 2021-02-08 | End: 2021-03-26

## 2021-02-12 ENCOUNTER — HOSPITAL ENCOUNTER (OUTPATIENT)
Dept: INFUSION CENTER | Facility: CLINIC | Age: 46
End: 2021-02-12

## 2021-02-15 ENCOUNTER — DOCTOR'S OFFICE (OUTPATIENT)
Dept: URBAN - NONMETROPOLITAN AREA CLINIC 1 | Facility: CLINIC | Age: 46
Setting detail: OPHTHALMOLOGY
End: 2021-02-15
Payer: COMMERCIAL

## 2021-02-15 DIAGNOSIS — H40.003: ICD-10-CM

## 2021-02-15 PROCEDURE — 92083 EXTENDED VISUAL FIELD XM: CPT | Performed by: OPHTHALMOLOGY

## 2021-02-16 ENCOUNTER — TELEPHONE (OUTPATIENT)
Dept: GYNECOLOGY | Facility: CLINIC | Age: 46
End: 2021-02-16

## 2021-02-16 ENCOUNTER — TELEMEDICINE (OUTPATIENT)
Dept: FAMILY MEDICINE CLINIC | Facility: HOME HEALTHCARE | Age: 46
End: 2021-02-16
Payer: COMMERCIAL

## 2021-02-16 DIAGNOSIS — Z20.822 ENCOUNTER BY TELEHEALTH FOR SUSPECTED COVID-19: Primary | ICD-10-CM

## 2021-02-16 DIAGNOSIS — R00.2 PALPITATION: ICD-10-CM

## 2021-02-16 PROCEDURE — G0071 COMM SVCS BY RHC/FQHC 5 MIN: HCPCS | Performed by: FAMILY MEDICINE

## 2021-02-16 RX ORDER — BUTALBITAL, ACETAMINOPHEN AND CAFFEINE 50; 325; 40 MG/1; MG/1; MG/1
1 TABLET ORAL EVERY 8 HOURS PRN
Qty: 20 TABLET | Refills: 0 | Status: SHIPPED | OUTPATIENT
Start: 2021-02-16 | End: 2021-05-21

## 2021-02-16 NOTE — PROGRESS NOTES
Virtual Brief Visit    Assessment/Plan:    Problem List Items Addressed This Visit     None      Visit Diagnoses     Encounter by telehealth for suspected COVID-19    -  Primary    Relevant Orders    Novel Coronavirus (COVID-19), PCR [facility] Collected at [facility] or [facility]        -covid test placed  Reviewed symptomatic treatment and self isolation until results are available per CDC guidelines  Advised adequate fluid hydration which patient gets weekly on Thursdays  Reviewed urgent smptoms requiring ER follow up  Pt verbalized understanding  -we will call with results and further recommendations pending test result          Reason for visit is   Chief Complaint   Patient presents with    COVID-19     headache, nausea, fatigue  Pt initially thought it was allergic reaction to contrast from recent imaging   Virtual Brief Visit        Encounter provider Chrissy Spivey PA-C    Provider located at [address] [place] [address]  [phone]    Recent Visits  No visits were found meeting these conditions  Showing recent visits within past 7 days and meeting all other requirements     Today's Visits  Date Type Provider Dept   02/16/21 Telemedicine Lata Coulter PA-C [facility] today's visits and meeting all other requirements     Future Appointments  No visits were found meeting these conditions  Showing future appointments within next 150 days and meeting all other requirements        After connecting through telephone, the patient was identified by name and date of birth  Sissyhamilton Gonzalezs was informed that this is a telemedicine visit and that the visit is being conducted through telephone  My office door was closed  No one else was in the room  She acknowledged consent and understanding of privacy and security of the platform   The patient has agreed to participate and understands she can discontinue the visit at any time     Patient is aware this is a billable service  Subjective    Kathy Rubin is a 50 y o  female who presents via telemedicine with concern of COVID infection  She states she has been having symptoms of fatigue, nausea, vomiting, diarrhea, loss of appetite, sore throat, and headaches on going since Saturday  She notes having an MRI last week and typically gets 2-3 days of diarrhea from the contrast but states this is beyond her typical  She has no known sick contacts but states following her symptoms her son also developed symptoms and was COVID tested, results pending  HPI     Past Medical History:   Diagnosis Date    Anxiety     Asthma     Chronic kidney disease     Deep vein thrombosis (HCC)     Disease of thyroid gland     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    Generalized anxiety disorder 8/26/2017    Heart murmur     Migraine     Myocardial infarction [facility])     NSTEMI  pt denies, documented in cardio note    Pancreatitis     sphinger of     Psychiatric disorder     RA (rheumatoid arthritis) ([facility] )     Ulcerative colitis ([facility] )        Past Surgical History:   Procedure Laterality Date    APPENDECTOMY      CHOLECYSTECTOMY      EGD AND COLONOSCOPY N/A 9/12/2017    Procedure: EGD AND COLONOSCOPY;  Surgeon: Karuna Castle MD;  [facility]; Service: Gastroenterology    ERCP N/A 9/15/2017    Procedure: ENDOSCOPIC RETROGRADE CHOLANGIOPANCREATOGRAPHY (ERCP); Surgeon: Lelo Cano MD;  [facility];   Service: Gastroenterology    HYSTERECTOMY      KNEE SURGERY Right     LAPAROSCOPIC ENDOMETRIOSIS FULGURATION      ORIF ANKLE FRACTURE Left     NM KNEE SCOPE,MED/LAT MENISECTOMY Left 5/12/2017    Procedure: POSSIBLE MEDIAL MENISECTOMY;  Surgeon: Jeanine Romero MD;  [facility];  Service: Orthopedics    NM KNEE [address] CART Left 5/12/2017    Procedure: KNEE ARTHROSCOPY EVALUATION, CHONDROPLASTY;  Surgeon: Jeanine Romero MD;  [facility]: [facility];  Service: Orthopedics    MT LAP,DIAGNOSTIC ABDOMEN N/A 12/12/2017    Procedure: LAPAROSCOPY DIAGNOSTIC  WITH LYSIS OF ADHESIONS;  Surgeon: Nayeli Pastrana DO;  [facility];  Service: General       Current Outpatient Medications   Medication Sig Dispense Refill    busPIRone (BUSPAR) 5 mg tablet TAKE ONE TABLET BY MOUTH EVERY 24 HOURS 30 tablet 0    butalbital-acetaminophen-caffeine (FIORICET,ESGIC) -40 mg per tablet Take 1 tablet by mouth every 8 (eight) hours as needed for headaches 20 tablet 0    calcium citrate (CALCITRATE) 950 (200 Ca) MG tablet Take 1 tablet (950 mg total) by mouth daily 90 tablet 3    cetirizine (ZyrTEC) 10 mg tablet Take 20 mg by mouth 2 (two) times a day as needed ("relief")       cholecalciferol (VITAMIN D) 400 units/mL Take 2 5 mL (1,000 Units total) by mouth daily (Patient taking differently: Take 1,200 Units by mouth daily ) 1 Bottle 5    cromolyn (GASTROCROM) 100 MG/5ML solution   Start: 11/16/20 8:54:00 EST, only when in Green zone 2 vials bid      cyanocobalamin 1,000 mcg/mL       diclofenac (VOLTAREN) 75 mg EC tablet Take 1 tablet (75 mg total) by mouth 2 (two) times a day for 21 days 42 tablet 2    diphenhydrAMINE (BENADRYL) 25 mg tablet Take 50 mg by mouth 2 (two) times a day       EPINEPHrine (EPIPEN 2-MARYSOL IJ) EpiPen   prn      ergocalciferol (VITAMIN D2) 50,000 units       famotidine (PEPCID) 20 mg/2 5 mL oral suspension Take 2 5 mL (20 mg total) by mouth 2 (two) times a day 150 mL 0    hydrOXYzine HCL (ATARAX) 25 mg tablet       levothyroxine 50 mcg tablet Take 50 mcg by mouth daily      liothyronine (CYTOMEL) 5 mcg tablet Take 5 mcg by mouth daily      LORazepam (ATIVAN) 1 mg tablet Take 1 tablet (1 mg total) by mouth every 8 (eight) hours as needed for anxiety 21 tablet 0    montelukast (SINGULAIR) 10 mg tablet Take 1 tablet (10 mg total) by mouth daily at bedtime 30 tablet 0    NON FORMULARY immunoglobin sq 6g 30 ml once a week      omalizumab (XOLAIR) 150 mg Inject 300 mg under the skin every 28 days       predniSONE 20 mg tablet Take by mouth daily      Riboflavin (Vitamin B-2) 100 MG TABS Take 4 tablets (400 mg total) by mouth daily 360 tablet 3    Stelara 90 MG/ML subcutaneous injection       temazepam (RESTORIL) 30 mg capsule Take 1 capsule (30 mg total) by mouth daily at bedtime as needed for sleep 30 capsule 5     Current Facility-Administered Medications   Medication Dose Route Frequency Provider Last Rate Last Admin    cyanocobalamin injection 1,000 mcg  1,000 mcg Intramuscular Q30 Days Mary Davies PA-C   1,000 mcg at 10/25/19 1204        Allergies   Allergen Reactions    Amitriptyline Anaphylaxis     According to the patient she had nphylaxis    Aspergillus Fumigatus Other (See Comments), Hives and Swelling    Azathioprine Other (See Comments) and Anaphylaxis     pancreatitis  According to patient she needed Epipen    Eggs Or Egg-Derived Products Anaphylaxis    Sumatriptan Anaphylaxis     Bradycardia, sob, back pressure, head pain,throat tightness  According to the patient she had anphylaxis    Contrast [Iodinated Diagnostic Agents]      Pt states needs to be premedicated prior to injection    Corn Dextrin      Any corn based products    Corn Oil Hives    Gelatin     Histamine      Intolerance      Ibuprofen Hives and Throat Swelling    Lidocaine     Malto Dextrin [Dextrin]     Other      Gel caps, maltodextrose, dextrose,grapes    Penicillium Notatum     Sulfa Antibiotics Hives and Other (See Comments)    Sulfate     Sulfites        Review of Systems   Constitutional: Positive for appetite change and fatigue  Negative for chills, diaphoresis and fever  HENT: Positive for sore throat  Respiratory: Negative for cough, chest tightness and shortness of breath  Gastrointestinal: Positive for diarrhea, nausea and vomiting  Negative for abdominal pain  Neurological: Positive for headaches         There were no vitals filed for this visit  I spent 10 minutes with patient today in which greater than 50% of the time was spent in counseling/coordination of care regarding assessment and plan of care    VIRTUAL VISIT DISCLAIMER    Flynn Sasha acknowledges that she has consented to an online visit or consultation  She understands that the online visit is based solely on information provided by her, and that, in the absence of a face-to-face physical evaluation by the physician, the diagnosis she receives is both limited and provisional in terms of accuracy and completeness  This is not intended to replace a full medical face-to-face evaluation by the physician  Flynn Baez understands and accepts these terms  It was my intent to perform this visit via video technology but the patient was not able to do a video connection so the visit was completed via audio telephone only

## 2021-02-16 NOTE — TELEPHONE ENCOUNTER
Patient contacted us via my chart asking for appt with Hetal Chand and lm to do this    Waiting on reply

## 2021-02-17 ENCOUNTER — TELEPHONE (OUTPATIENT)
Dept: BEHAVIORAL/MENTAL HEALTH CLINIC | Facility: CLINIC | Age: 46
End: 2021-02-17

## 2021-02-17 DIAGNOSIS — Z20.822 ENCOUNTER BY TELEHEALTH FOR SUSPECTED COVID-19: ICD-10-CM

## 2021-02-17 PROCEDURE — U0003 INFECTIOUS AGENT DETECTION BY NUCLEIC ACID (DNA OR RNA); SEVERE ACUTE RESPIRATORY SYNDROME CORONAVIRUS 2 (SARS-COV-2) (CORONAVIRUS DISEASE [COVID-19]), AMPLIFIED PROBE TECHNIQUE, MAKING USE OF HIGH THROUGHPUT TECHNOLOGIES AS DESCRIBED BY CMS-2020-01-R: HCPCS | Performed by: PHYSICIAN ASSISTANT

## 2021-02-17 PROCEDURE — U0005 INFEC AGEN DETEC AMPLI PROBE: HCPCS | Performed by: PHYSICIAN ASSISTANT

## 2021-02-17 NOTE — TELEPHONE ENCOUNTER
Contacted the Client who reported that she has been having increased in health issues and will be having a medical procedure which will require her to be on bed rest for a week  She requested that the appointment be cancelled and she will will call to rescheduled  She does not want to be closed from [place] [id number] therapy at this time

## 2021-02-18 ENCOUNTER — HOSPITAL ENCOUNTER (OUTPATIENT)
Dept: INFUSION CENTER | Facility: CLINIC | Age: 46
Discharge: HOME | End: 2021-02-18

## 2021-02-18 LAB — SARS-COV-2 RNA RESP QL NAA+PROBE: NEGATIVE

## 2021-02-19 ENCOUNTER — TELEPHONE (OUTPATIENT)
Dept: FAMILY MEDICINE CLINIC | Facility: CLINIC | Age: 46
End: 2021-02-19

## 2021-02-19 NOTE — TELEPHONE ENCOUNTER
LMOM for pt to call office back    ----- Message from Robyn Ceballos PA-C sent at 2/19/2021  8:07 AM EST -----  Please let pt know her COVID test is negative

## 2021-02-22 ENCOUNTER — TRANSCRIBE ORDERS (OUTPATIENT)
Dept: ADMINISTRATIVE | Facility: HOSPITAL | Age: 46
End: 2021-02-22

## 2021-02-22 ENCOUNTER — LAB (OUTPATIENT)
Dept: LAB | Facility: HOSPITAL | Age: 46
End: 2021-02-22
Attending: INTERNAL MEDICINE
Payer: COMMERCIAL

## 2021-02-22 ENCOUNTER — TELEPHONE (OUTPATIENT)
Dept: GASTROENTEROLOGY | Facility: MEDICAL CENTER | Age: 46
End: 2021-02-22

## 2021-02-22 DIAGNOSIS — R19.7 DIARRHEA OF PRESUMED INFECTIOUS ORIGIN: ICD-10-CM

## 2021-02-22 DIAGNOSIS — E55.9 VITAMIN D DEFICIENCY DISEASE: ICD-10-CM

## 2021-02-22 DIAGNOSIS — K50.119 CROHN'S DISEASE OF COLON WITH COMPLICATION (HCC): Primary | ICD-10-CM

## 2021-02-22 DIAGNOSIS — E03.2 HYPOTHYROIDISM DUE TO DRUGS: Primary | ICD-10-CM

## 2021-02-22 DIAGNOSIS — G62.9 POLYNEUROPATHY: ICD-10-CM

## 2021-02-22 DIAGNOSIS — K50.119 CROHN'S DISEASE OF COLON WITH COMPLICATION (HCC): ICD-10-CM

## 2021-02-22 DIAGNOSIS — D84.9 IMMUNOSUPPRESSED STATUS (HCC): ICD-10-CM

## 2021-02-22 DIAGNOSIS — R19.7 DIARRHEA, UNSPECIFIED TYPE: ICD-10-CM

## 2021-02-22 DIAGNOSIS — E03.2 HYPOTHYROIDISM DUE TO DRUGS: ICD-10-CM

## 2021-02-22 LAB
25(OH)D3 SERPL-MCNC: 79 NG/ML (ref 30–100)
ALBUMIN SERPL BCP-MCNC: 3.3 G/DL (ref 3.5–5)
ALP SERPL-CCNC: 65 U/L (ref 46–116)
ALT SERPL W P-5'-P-CCNC: 42 U/L (ref 12–78)
ANION GAP SERPL CALCULATED.3IONS-SCNC: 9 MMOL/L (ref 4–13)
AST SERPL W P-5'-P-CCNC: 37 U/L (ref 5–45)
BASOPHILS # BLD AUTO: 0.08 THOUSANDS/ΜL (ref 0–0.1)
BASOPHILS NFR BLD AUTO: 1 % (ref 0–1)
BILIRUB SERPL-MCNC: 0.2 MG/DL (ref 0.2–1)
BUN SERPL-MCNC: 11 MG/DL (ref 5–25)
CALCIUM ALBUM COR SERPL-MCNC: 9.7 MG/DL (ref 8.3–10.1)
CALCIUM SERPL-MCNC: 9.1 MG/DL (ref 8.3–10.1)
CHLORIDE SERPL-SCNC: 105 MMOL/L (ref 100–108)
CO2 SERPL-SCNC: 27 MMOL/L (ref 21–32)
CREAT SERPL-MCNC: 0.76 MG/DL (ref 0.6–1.3)
CRP SERPL QL: 7.1 MG/L
EOSINOPHIL # BLD AUTO: 0.03 THOUSAND/ΜL (ref 0–0.61)
EOSINOPHIL NFR BLD AUTO: 0 % (ref 0–6)
ERYTHROCYTE [DISTWIDTH] IN BLOOD BY AUTOMATED COUNT: 13.2 % (ref 11.6–15.1)
EST. AVERAGE GLUCOSE BLD GHB EST-MCNC: 108 MG/DL
GFR SERPL CREATININE-BSD FRML MDRD: 93 ML/MIN/1.73SQ M
GLUCOSE SERPL-MCNC: 158 MG/DL (ref 65–140)
HBA1C MFR BLD: 5.4 %
HCT VFR BLD AUTO: 43.9 % (ref 34.8–46.1)
HGB BLD-MCNC: 14 G/DL (ref 11.5–15.4)
IMM GRANULOCYTES # BLD AUTO: 0.05 THOUSAND/UL (ref 0–0.2)
IMM GRANULOCYTES NFR BLD AUTO: 1 % (ref 0–2)
LYMPHOCYTES # BLD AUTO: 0.91 THOUSANDS/ΜL (ref 0.6–4.47)
LYMPHOCYTES NFR BLD AUTO: 10 % (ref 14–44)
MCH RBC QN AUTO: 30.4 PG (ref 26.8–34.3)
MCHC RBC AUTO-ENTMCNC: 31.9 G/DL (ref 31.4–37.4)
MCV RBC AUTO: 95 FL (ref 82–98)
MONOCYTES # BLD AUTO: 0.19 THOUSAND/ΜL (ref 0.17–1.22)
MONOCYTES NFR BLD AUTO: 2 % (ref 4–12)
NEUTROPHILS # BLD AUTO: 7.79 THOUSANDS/ΜL (ref 1.85–7.62)
NEUTS SEG NFR BLD AUTO: 86 % (ref 43–75)
NRBC BLD AUTO-RTO: 0 /100 WBCS
PLATELET # BLD AUTO: 519 THOUSANDS/UL (ref 149–390)
PMV BLD AUTO: 8.6 FL (ref 8.9–12.7)
POTASSIUM SERPL-SCNC: 4 MMOL/L (ref 3.5–5.3)
PROT SERPL-MCNC: 7.6 G/DL (ref 6.4–8.2)
RBC # BLD AUTO: 4.6 MILLION/UL (ref 3.81–5.12)
SODIUM SERPL-SCNC: 141 MMOL/L (ref 136–145)
T3FREE SERPL-MCNC: 1.72 PG/ML (ref 2.3–4.2)
T4 FREE SERPL-MCNC: 0.96 NG/DL (ref 0.76–1.46)
TSH SERPL DL<=0.05 MIU/L-ACNC: 0.46 UIU/ML (ref 0.36–3.74)
VIT B12 SERPL-MCNC: 970 PG/ML (ref 100–900)
WBC # BLD AUTO: 9.05 THOUSAND/UL (ref 4.31–10.16)

## 2021-02-22 PROCEDURE — 82607 VITAMIN B-12: CPT

## 2021-02-22 PROCEDURE — 80299 QUANTITATIVE ASSAY DRUG: CPT

## 2021-02-22 PROCEDURE — 84443 ASSAY THYROID STIM HORMONE: CPT

## 2021-02-22 PROCEDURE — 83036 HEMOGLOBIN GLYCOSYLATED A1C: CPT

## 2021-02-22 PROCEDURE — 82306 VITAMIN D 25 HYDROXY: CPT

## 2021-02-22 PROCEDURE — 82397 CHEMILUMINESCENT ASSAY: CPT

## 2021-02-22 PROCEDURE — 86140 C-REACTIVE PROTEIN: CPT

## 2021-02-22 PROCEDURE — 86800 THYROGLOBULIN ANTIBODY: CPT

## 2021-02-22 PROCEDURE — 80053 COMPREHEN METABOLIC PANEL: CPT

## 2021-02-22 PROCEDURE — 36415 COLL VENOUS BLD VENIPUNCTURE: CPT

## 2021-02-22 PROCEDURE — 84432 ASSAY OF THYROGLOBULIN: CPT

## 2021-02-22 PROCEDURE — 84481 FREE ASSAY (FT-3): CPT

## 2021-02-22 PROCEDURE — 84439 ASSAY OF FREE THYROXINE: CPT

## 2021-02-22 PROCEDURE — 85025 COMPLETE CBC W/AUTO DIFF WBC: CPT

## 2021-02-22 NOTE — TELEPHONE ENCOUNTER
Patient aware of recommendation; continues to cry through our conversation  She said she  will consider going to [facility]

## 2021-02-22 NOTE — TELEPHONE ENCOUNTER
Patient has complex medical hx including IBD, MCAS, MS;  on stelara  She was crying/tearful during entire conversation reporting persistent diarrhea (now with blood noted) unable to stand due to incontinence, progressing to vomiting, and now unable to eat  She is scheduled for next IV hydration Feb 26 and said next shipment of stelara should be received soon  We discussed outstanding labs, drug level, CRP, fecal calprotectin  She also requested CBC, CMP (I put orders in )  She will try to get done today    I offered reassurance and  suggested f/u within a week after testing completed but she said "I can't make it until then"  Due to severity of symptoms, I  recommended she go to ED for symptom control/assessment however she does not feel the ED is helpful and am not certain she will go  She asked for direct admission however we recommend ED and admit if advised  Please provide your input, thank you

## 2021-02-22 NOTE — TELEPHONE ENCOUNTER
Patient called in asking to speak with Dr Ayanna De La Cruz  Patient stated that she has been messaging him regarding blood work but would like to speak with someone because she is either going to need a direct admission or will be going to the ER

## 2021-02-24 LAB
THYROGLOB AB SERPL-ACNC: <1 IU/ML (ref 0–0.9)
THYROGLOB SERPL-MCNC: 8.9 NG/ML (ref 1.5–38.5)

## 2021-02-25 ENCOUNTER — TELEPHONE (OUTPATIENT)
Dept: FAMILY MEDICINE CLINIC | Facility: CLINIC | Age: 46
End: 2021-02-25

## 2021-02-26 ENCOUNTER — HOSPITAL ENCOUNTER (OUTPATIENT)
Dept: INFUSION CENTER | Facility: CLINIC | Age: 46
End: 2021-02-26

## 2021-03-02 DIAGNOSIS — K50.119 CROHN'S DISEASE OF COLON WITH COMPLICATION (HCC): ICD-10-CM

## 2021-03-02 DIAGNOSIS — E44.1 MILD PROTEIN-CALORIE MALNUTRITION (HCC): ICD-10-CM

## 2021-03-02 DIAGNOSIS — R63.0 ANOREXIA: Primary | ICD-10-CM

## 2021-03-03 ENCOUNTER — APPOINTMENT (EMERGENCY)
Dept: CT IMAGING | Facility: HOSPITAL | Age: 46
DRG: 386 | End: 2021-03-03
Payer: COMMERCIAL

## 2021-03-03 ENCOUNTER — HOSPITAL ENCOUNTER (INPATIENT)
Facility: HOSPITAL | Age: 46
LOS: 9 days | Discharge: HOME | DRG: 386 | End: 2021-03-12
Attending: EMERGENCY MEDICINE | Admitting: INTERNAL MEDICINE
Payer: COMMERCIAL

## 2021-03-03 DIAGNOSIS — K52.9 COLITIS: Primary | ICD-10-CM

## 2021-03-03 DIAGNOSIS — R10.9 ABDOMINAL PAIN: ICD-10-CM

## 2021-03-03 DIAGNOSIS — K52.9 INFLAMMATORY BOWEL DISEASE: ICD-10-CM

## 2021-03-03 DIAGNOSIS — R10.11 RUQ ABDOMINAL PAIN: ICD-10-CM

## 2021-03-03 DIAGNOSIS — K50.119 CROHN'S DISEASE OF COLON WITH COMPLICATION (HCC): ICD-10-CM

## 2021-03-03 DIAGNOSIS — R19.7 DIARRHEA, UNSPECIFIED TYPE: ICD-10-CM

## 2021-03-03 LAB
ALBUMIN SERPL BCP-MCNC: 3.1 G/DL (ref 3.5–5)
ALP SERPL-CCNC: 51 U/L (ref 46–116)
ALT SERPL W P-5'-P-CCNC: 51 U/L (ref 12–78)
ANION GAP SERPL CALCULATED.3IONS-SCNC: 6 MMOL/L (ref 4–13)
APTT PPP: 25 SECONDS (ref 23–37)
AST SERPL W P-5'-P-CCNC: 26 U/L (ref 5–45)
BASOPHILS # BLD AUTO: 0.17 THOUSANDS/ΜL (ref 0–0.1)
BASOPHILS NFR BLD AUTO: 2 % (ref 0–1)
BILIRUB SERPL-MCNC: 0.3 MG/DL (ref 0.2–1)
BILIRUB UR QL STRIP: NEGATIVE
BUN SERPL-MCNC: 10 MG/DL (ref 5–25)
CALCIUM ALBUM COR SERPL-MCNC: 9.3 MG/DL (ref 8.3–10.1)
CALCIUM SERPL-MCNC: 8.6 MG/DL (ref 8.3–10.1)
CHLORIDE SERPL-SCNC: 106 MMOL/L (ref 100–108)
CLARITY UR: CLEAR
CO2 SERPL-SCNC: 29 MMOL/L (ref 21–32)
COLOR UR: YELLOW
CREAT SERPL-MCNC: 0.84 MG/DL (ref 0.6–1.3)
CRP SERPL QL: <0.5 MG/L
EOSINOPHIL # BLD AUTO: 1.11 THOUSAND/ΜL (ref 0–0.61)
EOSINOPHIL NFR BLD AUTO: 11 % (ref 0–6)
ERYTHROCYTE [DISTWIDTH] IN BLOOD BY AUTOMATED COUNT: 13.8 % (ref 11.6–15.1)
GFR SERPL CREATININE-BSD FRML MDRD: 82 ML/MIN/1.73SQ M
GLUCOSE SERPL-MCNC: 130 MG/DL (ref 65–140)
GLUCOSE UR STRIP-MCNC: NEGATIVE MG/DL
HCT VFR BLD AUTO: 43.3 % (ref 34.8–46.1)
HGB BLD-MCNC: 13.7 G/DL (ref 11.5–15.4)
HGB UR QL STRIP.AUTO: NEGATIVE
IMM GRANULOCYTES # BLD AUTO: 0.05 THOUSAND/UL (ref 0–0.2)
IMM GRANULOCYTES NFR BLD AUTO: 1 % (ref 0–2)
INR PPP: 0.94 (ref 0.84–1.19)
KETONES UR STRIP-MCNC: NEGATIVE MG/DL
LACTATE SERPL-SCNC: 1.9 MMOL/L (ref 0.5–2)
LEUKOCYTE ESTERASE UR QL STRIP: NEGATIVE
LIPASE SERPL-CCNC: 81 U/L (ref 73–393)
LYMPHOCYTES # BLD AUTO: 2.41 THOUSANDS/ΜL (ref 0.6–4.47)
LYMPHOCYTES NFR BLD AUTO: 23 % (ref 14–44)
MAGNESIUM SERPL-MCNC: 1.8 MG/DL (ref 1.6–2.6)
MCH RBC QN AUTO: 30.3 PG (ref 26.8–34.3)
MCHC RBC AUTO-ENTMCNC: 31.6 G/DL (ref 31.4–37.4)
MCV RBC AUTO: 96 FL (ref 82–98)
MONOCYTES # BLD AUTO: 0.68 THOUSAND/ΜL (ref 0.17–1.22)
MONOCYTES NFR BLD AUTO: 6 % (ref 4–12)
NEUTROPHILS # BLD AUTO: 6.19 THOUSANDS/ΜL (ref 1.85–7.62)
NEUTS SEG NFR BLD AUTO: 57 % (ref 43–75)
NITRITE UR QL STRIP: NEGATIVE
NRBC BLD AUTO-RTO: 0 /100 WBCS
PH UR STRIP.AUTO: 7 [PH]
PLATELET # BLD AUTO: 519 THOUSANDS/UL (ref 149–390)
PMV BLD AUTO: 9 FL (ref 8.9–12.7)
POTASSIUM SERPL-SCNC: 3.2 MMOL/L (ref 3.5–5.3)
PROT SERPL-MCNC: 7 G/DL (ref 6.4–8.2)
PROT UR STRIP-MCNC: NEGATIVE MG/DL
PROTHROMBIN TIME: 12.4 SECONDS (ref 11.6–14.5)
RBC # BLD AUTO: 4.52 MILLION/UL (ref 3.81–5.12)
SODIUM SERPL-SCNC: 141 MMOL/L (ref 136–145)
SP GR UR STRIP.AUTO: 1.01 (ref 1–1.03)
TROPONIN I SERPL-MCNC: <0.02 NG/ML
UROBILINOGEN UR QL STRIP.AUTO: 0.2 E.U./DL
WBC # BLD AUTO: 10.61 THOUSAND/UL (ref 4.31–10.16)

## 2021-03-03 PROCEDURE — 81003 URINALYSIS AUTO W/O SCOPE: CPT | Performed by: PHYSICIAN ASSISTANT

## 2021-03-03 PROCEDURE — 74177 CT ABD & PELVIS W/CONTRAST: CPT

## 2021-03-03 PROCEDURE — 99285 EMERGENCY DEPT VISIT HI MDM: CPT | Performed by: PHYSICIAN ASSISTANT

## 2021-03-03 PROCEDURE — 83605 ASSAY OF LACTIC ACID: CPT | Performed by: PHYSICIAN ASSISTANT

## 2021-03-03 PROCEDURE — 99223 1ST HOSP IP/OBS HIGH 75: CPT | Performed by: NURSE PRACTITIONER

## 2021-03-03 PROCEDURE — 80053 COMPREHEN METABOLIC PANEL: CPT | Performed by: PHYSICIAN ASSISTANT

## 2021-03-03 PROCEDURE — 96366 THER/PROPH/DIAG IV INF ADDON: CPT

## 2021-03-03 PROCEDURE — 83735 ASSAY OF MAGNESIUM: CPT | Performed by: PHYSICIAN ASSISTANT

## 2021-03-03 PROCEDURE — 99285 EMERGENCY DEPT VISIT HI MDM: CPT

## 2021-03-03 PROCEDURE — 36415 COLL VENOUS BLD VENIPUNCTURE: CPT | Performed by: PHYSICIAN ASSISTANT

## 2021-03-03 PROCEDURE — G1004 CDSM NDSC: HCPCS

## 2021-03-03 PROCEDURE — 96365 THER/PROPH/DIAG IV INF INIT: CPT

## 2021-03-03 PROCEDURE — 86140 C-REACTIVE PROTEIN: CPT | Performed by: PHYSICIAN ASSISTANT

## 2021-03-03 PROCEDURE — 85730 THROMBOPLASTIN TIME PARTIAL: CPT | Performed by: PHYSICIAN ASSISTANT

## 2021-03-03 PROCEDURE — 99222 1ST HOSP IP/OBS MODERATE 55: CPT | Performed by: INTERNAL MEDICINE

## 2021-03-03 PROCEDURE — 96376 TX/PRO/DX INJ SAME DRUG ADON: CPT

## 2021-03-03 PROCEDURE — 83690 ASSAY OF LIPASE: CPT | Performed by: PHYSICIAN ASSISTANT

## 2021-03-03 PROCEDURE — 85610 PROTHROMBIN TIME: CPT | Performed by: PHYSICIAN ASSISTANT

## 2021-03-03 PROCEDURE — 84484 ASSAY OF TROPONIN QUANT: CPT | Performed by: PHYSICIAN ASSISTANT

## 2021-03-03 PROCEDURE — 85025 COMPLETE CBC W/AUTO DIFF WBC: CPT | Performed by: PHYSICIAN ASSISTANT

## 2021-03-03 PROCEDURE — 96375 TX/PRO/DX INJ NEW DRUG ADDON: CPT

## 2021-03-03 RX ORDER — SODIUM CHLORIDE 9 MG/ML
75 INJECTION, SOLUTION INTRAVENOUS CONTINUOUS
Status: DISCONTINUED | OUTPATIENT
Start: 2021-03-04 | End: 2021-03-10

## 2021-03-03 RX ORDER — DIPHENHYDRAMINE HYDROCHLORIDE 50 MG/ML
50 INJECTION INTRAMUSCULAR; INTRAVENOUS ONCE
Status: COMPLETED | OUTPATIENT
Start: 2021-03-03 | End: 2021-03-03

## 2021-03-03 RX ORDER — FENTANYL CITRATE 50 UG/ML
25 INJECTION, SOLUTION INTRAMUSCULAR; INTRAVENOUS ONCE
Status: COMPLETED | OUTPATIENT
Start: 2021-03-03 | End: 2021-03-03

## 2021-03-03 RX ORDER — POTASSIUM CHLORIDE 14.9 MG/ML
20 INJECTION INTRAVENOUS ONCE
Status: COMPLETED | OUTPATIENT
Start: 2021-03-03 | End: 2021-03-03

## 2021-03-03 RX ORDER — HYDROXYZINE HYDROCHLORIDE 25 MG/1
25 TABLET, FILM COATED ORAL 4 TIMES DAILY
Status: DISCONTINUED | OUTPATIENT
Start: 2021-03-04 | End: 2021-03-04

## 2021-03-03 RX ORDER — POTASSIUM CHLORIDE 20 MEQ/1
40 TABLET, EXTENDED RELEASE ORAL ONCE
Status: DISCONTINUED | OUTPATIENT
Start: 2021-03-03 | End: 2021-03-04

## 2021-03-03 RX ORDER — DIPHENHYDRAMINE HYDROCHLORIDE 50 MG/ML
25 INJECTION INTRAMUSCULAR; INTRAVENOUS ONCE
Status: COMPLETED | OUTPATIENT
Start: 2021-03-03 | End: 2021-03-03

## 2021-03-03 RX ORDER — BUSPIRONE HYDROCHLORIDE 5 MG/1
5 TABLET ORAL EVERY 24 HOURS
Status: DISCONTINUED | OUTPATIENT
Start: 2021-03-03 | End: 2021-03-12 | Stop reason: HOSPADM

## 2021-03-03 RX ORDER — POTASSIUM CHLORIDE 14.9 MG/ML
20 INJECTION INTRAVENOUS
Status: COMPLETED | OUTPATIENT
Start: 2021-03-04 | End: 2021-03-04

## 2021-03-03 RX ORDER — MONTELUKAST SODIUM 10 MG/1
10 TABLET ORAL
Status: DISCONTINUED | OUTPATIENT
Start: 2021-03-03 | End: 2021-03-04

## 2021-03-03 RX ORDER — LEVALBUTEROL TARTRATE 45 UG/1
1-2 AEROSOL, METERED ORAL EVERY 4 HOURS PRN
COMMUNITY

## 2021-03-03 RX ORDER — HYDROMORPHONE HCL/PF 1 MG/ML
0.5 SYRINGE (ML) INJECTION ONCE
Status: COMPLETED | OUTPATIENT
Start: 2021-03-03 | End: 2021-03-03

## 2021-03-03 RX ORDER — ACETAMINOPHEN 500 MG
1000 TABLET ORAL EVERY 6 HOURS PRN
COMMUNITY
End: 2022-07-07

## 2021-03-03 RX ORDER — BUDESONIDE 3 MG/1
3 CAPSULE, COATED PELLETS ORAL 3 TIMES DAILY
COMMUNITY
End: 2021-04-14 | Stop reason: SDUPTHER

## 2021-03-03 RX ORDER — LORAZEPAM 1 MG/1
1 TABLET ORAL EVERY 8 HOURS PRN
Status: DISCONTINUED | OUTPATIENT
Start: 2021-03-03 | End: 2021-03-04

## 2021-03-03 RX ORDER — LEVOTHYROXINE SODIUM 0.05 MG/1
50 TABLET ORAL
Status: DISCONTINUED | OUTPATIENT
Start: 2021-03-04 | End: 2021-03-04

## 2021-03-03 RX ORDER — HEPARIN SODIUM 5000 [USP'U]/ML
5000 INJECTION, SOLUTION INTRAVENOUS; SUBCUTANEOUS EVERY 8 HOURS SCHEDULED
Status: DISCONTINUED | OUTPATIENT
Start: 2021-03-03 | End: 2021-03-12 | Stop reason: HOSPADM

## 2021-03-03 RX ORDER — METHYLPREDNISOLONE SODIUM SUCCINATE 125 MG/2ML
125 INJECTION, POWDER, LYOPHILIZED, FOR SOLUTION INTRAMUSCULAR; INTRAVENOUS ONCE
Status: COMPLETED | OUTPATIENT
Start: 2021-03-03 | End: 2021-03-03

## 2021-03-03 RX ADMIN — FAMOTIDINE 20 MG: 10 INJECTION INTRAVENOUS at 21:48

## 2021-03-03 RX ADMIN — SODIUM CHLORIDE 1000 ML: 0.9 INJECTION, SOLUTION INTRAVENOUS at 14:47

## 2021-03-03 RX ADMIN — FENTANYL CITRATE 25 MCG: 50 INJECTION, SOLUTION INTRAMUSCULAR; INTRAVENOUS at 14:48

## 2021-03-03 RX ADMIN — FENTANYL CITRATE 25 MCG: 50 INJECTION, SOLUTION INTRAMUSCULAR; INTRAVENOUS at 17:53

## 2021-03-03 RX ADMIN — POTASSIUM CHLORIDE 20 MEQ: 14.9 INJECTION, SOLUTION INTRAVENOUS at 14:49

## 2021-03-03 RX ADMIN — SODIUM CHLORIDE 1000 ML: 0.9 INJECTION, SOLUTION INTRAVENOUS at 16:43

## 2021-03-03 RX ADMIN — DIPHENHYDRAMINE HYDROCHLORIDE 25 MG: 50 INJECTION, SOLUTION INTRAMUSCULAR; INTRAVENOUS at 14:58

## 2021-03-03 RX ADMIN — DIPHENHYDRAMINE HYDROCHLORIDE 50 MG: 50 INJECTION, SOLUTION INTRAMUSCULAR; INTRAVENOUS at 20:29

## 2021-03-03 RX ADMIN — DIPHENHYDRAMINE HYDROCHLORIDE 25 MG: 50 INJECTION, SOLUTION INTRAMUSCULAR; INTRAVENOUS at 16:43

## 2021-03-03 RX ADMIN — METHYLPREDNISOLONE SODIUM SUCCINATE 125 MG: 125 INJECTION, POWDER, FOR SOLUTION INTRAMUSCULAR; INTRAVENOUS at 14:56

## 2021-03-03 RX ADMIN — IOHEXOL 100 ML: 350 INJECTION, SOLUTION INTRAVENOUS at 16:02

## 2021-03-03 RX ADMIN — HYDROMORPHONE HYDROCHLORIDE 0.5 MG: 1 INJECTION, SOLUTION INTRAMUSCULAR; INTRAVENOUS; SUBCUTANEOUS at 23:15

## 2021-03-03 NOTE — CONSULTS
Consultation -  [facility]  Sravani Judd 48 y o  female MRN: [id number]  Unit/Bed#: RM06 Encounter: [id number]        ASSESSMENT/PLAN:   IBD  Abdominal pain  Diarrhea    Patient has a complicated  GI past medical history including a inflammatory bowel disease, who presents with right upper quadrant abdominal pain and diarrhea  It's possible her symptoms are secondary to a flare, would recommend checking stool studies as well as a CRP  Given recent antibiotic use we need to rule out infectious process before starting steroids  A CT scan was ordered but given her history of mast cell activation she needs to be premedicated  Will wait the results of this for further recommendations  -  C diff, stool culture, O&P, fecal calprotectin and CRP  - follow-up on CT scan  - antiemetics as needed  - antidiarrheals once stool studies obtained  - pain medication as needed    Consults    Reason for Consult / Principal Problem: abd pain    HPI: Sravani Judd is a 48y o  year old female with a PMH of  Inflammatory bowel disease, mast cell activation syndrome, previously on Remicade however developed drug-induced lupus and then Humira again with concern for drug-induced lupus, currently on Stelara, status post infusion and 2 maintenance doses,   Sphincter of Oddi dysfunction, status post sphincterotomy who presents to the hospital with abdominal pain she states this is been ongoing for the last several weeks  She describes pain in the right upper quadrant  She states she has not been able to eat because of pain  She is also experiencing extreme diarrhea, greater than 15 times per day  She was also having migraines and underwent a lumbar patch in her diarrhea seemed to improve for a period of time but then seemed to return over the last few days  She was on antibiotics in January for UTI     Her last endoscopy was in October of 2020 which showed mild localized erosion in the antrum, otherwise within normal limits  She underwent colonoscopy at the same time  Moderate edematous and erythematous and ulcerated mucosa with erosion in the mid transverse colon, long linear ulcers were seen, moderate edematous and erythematous mucosa with erosion in the splenic flexure and descending colon  Biopsy showed mild gastritis, negative for H pylori and celiac  Colon biopsies were within normal limits with the exception of chronic colitis in the rectum  Review of Systems: as per HPI  Review of Systems   All other systems reviewed and are negative  Historical Information   Past Medical History:   Diagnosis Date    Anxiety     Asthma     Chronic kidney disease     Deep vein thrombosis (HCC)     Disease of thyroid gland     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    Generalized anxiety disorder 8/26/2017    Heart murmur     Migraine     Myocardial infarction [facility])     NSTEMI  pt denies, documented in cardio note    Pancreatitis     sphinger of     Psychiatric disorder     RA [facility]rheumatoid arthritis) ([facility] [address] [facility]     Ulcerative colitis ([facility] [address] )      Past Surgical History:   Procedure Laterality Date    APPENDECTOMY      CHOLECYSTECTOMY      EGD AND COLONOSCOPY N/A 9/12/2017    Procedure: EGD AND COLONOSCOPY;  Surgeon: Deidre Min MD;  Location: [facility]; Service: Gastroenterology    ERCP N/A 9/15/2017    Procedure: ENDOSCOPIC RETROGRADE CHOLANGIOPANCREATOGRAPHY (ERCP); Surgeon: Madeline Ceja MD;  Location: [facility];   Service: Gastroenterology    HYSTERECTOMY      KNEE SURGERY Right     LAPAROSCOPIC ENDOMETRIOSIS FULGURATION      ORIF ANKLE FRACTURE Left     MI KNEE SCOPE,MED/LAT MENISECTOMY Left 5/12/2017    Procedure: POSSIBLE MEDIAL MENISECTOMY;  Surgeon: Nicole Vides MD;  [facility];  Service: Orthopedics    MI KNEE [address] CART Left 5/12/2017    Procedure: KNEE ARTHROSCOPY EVALUATION, CHONDROPLASTY;  Surgeon: Nicole Vides MD;  [facility];  Service: Orthopedics    AL LAP,DIAGNOSTIC ABDOMEN N/A 12/12/2017    Procedure: LAPAROSCOPY DIAGNOSTIC  WITH LYSIS OF ADHESIONS;  Surgeon: Brian Owen DO;  [facility];  Service: General     Social History   Social History     Substance and Sexual Activity   Alcohol Use Never    Frequency: Never    Binge frequency: Never     Social History     Substance and Sexual Activity   Drug Use Not Currently     Social History     Tobacco Use   Smoking Status Never Smoker   Smokeless Tobacco Never Used     Family History   Problem Relation Age of Onset    Ulcerative colitis Mother     Heart failure Father     Neuropathy Father     Macular degeneration Father     Diabetes Father     Asthma Daughter     Hypothyroidism Daughter     Heart disease Maternal Grandmother     Arthritis Maternal Grandmother     Arthritis Paternal Grandmother     Macular degeneration Paternal Grandmother     Lymphoma Paternal Grandfather     Heart failure Paternal Aunt     Heart failure Paternal [de-identified]     Breast cancer Paternal Aunt 53    Breast cancer additional onset Paternal Aunt 62    Hypothyroidism Paternal Aunt     Breast cancer Maternal Aunt     No Known Problems Maternal Grandfather        Meds/Allergies     (Not in a hospital admission)    Current Facility-Administered Medications   Medication Dose Route Frequency    cyanocobalamin injection 1,000 mcg  1,000 mcg Intramuscular Q30 Days    potassium chloride 20 mEq IVPB (premix)  20 mEq Intravenous Once       Allergies   Allergen Reactions    Amitriptyline Anaphylaxis     According to the patient she had nphylaxis    Aspergillus Fumigatus Other (See Comments), Hives and Swelling    Azathioprine Other (See Comments) and Anaphylaxis     pancreatitis  According to patient she needed Epipen    Eggs Or Egg-Derived Products (Food Allergy) Anaphylaxis    Sumatriptan Anaphylaxis     Bradycardia, sob, back pressure, head pain,throat tightness  According to the patient she had anphylaxis    Contrast [Iodinated Diagnostic Agents]      Pt states needs to be premedicated prior to injection    Corn Dextrin      Any corn based products    Corn Oil (Food Allergy) (Food Allergy) Hives    Gelatin (Food Allergy)     Histamine      Intolerance      Humira [Adalimumab] Other (See Comments)     "I develop drug induced lupus"    Ibuprofen Hives and Throat Swelling    Lidocaine     Malto Dextrin [Dextrin]     Other      Gel caps, maltodextrose, dextrose,grapes    Penicillium Notatum     Remicade [Infliximab] Other (See Comments)     "I develop drug induced lupus"    Sulfa Antibiotics Hives and Other (See Comments)    Sulfate     Sulfites (Food Allergy)        Objective     Blood pressure 98/66, pulse 91, temperature 99 2 °F (37 3 °C), temperature source Temporal, resp  rate 17, height 5' 5 25" (1 657 m), weight 65 9 kg (145 lb 4 5 oz), SpO2 98 %, not currently breastfeeding  No intake or output data in the 24 hours ending 03/03/21 1607    PHYSICAL EXAM     Physical Exam  Constitutional:       Appearance: Normal appearance  She is well-developed  HENT:      Head: Normocephalic and atraumatic  Eyes:      Conjunctiva/sclera: Conjunctivae normal    Neck:      Musculoskeletal: Normal range of motion  Cardiovascular:      Rate and Rhythm: Normal rate and regular rhythm  Pulmonary:      Effort: Pulmonary effort is normal       Breath sounds: Normal breath sounds  Abdominal:      General: Bowel sounds are normal       Palpations: Abdomen is soft  Tenderness: There is abdominal tenderness (  Right upper quadrant /epigastrium)  Musculoskeletal: Normal range of motion  Skin:     General: Skin is warm and dry  Neurological:      Mental Status: She is alert and oriented to person, place, and time     Psychiatric:         Mood and Affect: Mood normal          Behavior: Behavior normal          Lab Results:   CBC:   Lab Results   Component Value Date    WBC 10 61 (H) 03/03/2021 HGB 13 7 03/03/2021    HCT 43 3 03/03/2021    MCV 96 03/03/2021     (H) 03/03/2021    MCH 30 3 03/03/2021    MCHC 31 6 03/03/2021    RDW 13 8 03/03/2021    MPV 9 0 03/03/2021    NRBC 0 03/03/2021   ,   CMP:   Lab Results   Component Value Date    K 3 2 (L) 03/03/2021     03/03/2021    CO2 29 03/03/2021    BUN 10 03/03/2021    CREATININE 0 84 03/03/2021    CALCIUM 8 6 03/03/2021    AST 26 03/03/2021    ALT 51 03/03/2021    ALKPHOS 51 03/03/2021    EGFR 82 03/03/2021   ,   Lipase:   Lab Results   Component Value Date    LIPASE 81 03/03/2021   ,  PT/INR:   Lab Results   Component Value Date    INR 0 94 03/03/2021   ,   Troponin:   Lab Results   Component Value Date    TROPONINI <0 02 03/03/2021         CT: pending

## 2021-03-03 NOTE — ED PROVIDER NOTES
History  Chief Complaint   Patient presents with    Abdominal Pain     patient reports RUQ pain for at least a week  patient reports n/v/d  extensive GI problems     Patient presents to the emergency department today for evaluation of abdominal pain  Patient states she has been trying to get direct admitted for the last 2 days secondary to abdominal pain however she was unsuccessful  She states the abdominal pain is primarily right upper into the epigastric region  She notes perfused vomiting or diarrhea any time she eats or drinks anything  No history of fever however she is mildly tachycardic here with a history of sepsis in the past   Patient is immunocompromised on Stelara  Patient is requesting a place a GI consult so she can be seen by the GI doctor here in the ER before they leave in 1 hour  Past surgical history positive for cholecystectomy, hysterectomy, appendectomy  Does have a history of Crohn's however  Pt requesting "No oral meds"    Prior to Admission Medications   Prescriptions Last Dose Informant Patient Reported? Taking?    EPINEPHrine (EPIPEN 2-MARYSOL IJ)   Yes No   Sig: EpiPen   prn   LORazepam (ATIVAN) 1 mg tablet   No No   Sig: Take 1 tablet (1 mg total) by mouth every 8 (eight) hours as needed for anxiety   NON FORMULARY   Yes No   Sig: immunoglobin sq 6g 30 ml once a week   Riboflavin (Vitamin B-2) 100 MG TABS   No No   Sig: Take 4 tablets (400 mg total) by mouth daily   Stelara 90 MG/ML subcutaneous injection   Yes No   busPIRone (BUSPAR) 5 mg tablet   No No   Sig: TAKE ONE TABLET BY MOUTH EVERY 24 HOURS   butalbital-acetaminophen-caffeine (FIORICET,ESGIC) -40 mg per tablet   No No   Sig: Take 1 tablet by mouth every 8 (eight) hours as needed for headaches   calcium citrate (CALCITRATE) 950 (200 Ca) MG tablet   No No   Sig: Take 1 tablet (950 mg total) by mouth daily   cetirizine (ZyrTEC) 10 mg tablet   Yes No   Sig: Take 20 mg by mouth 2 (two) times a day as needed ("relief")    cholecalciferol (VITAMIN D) 400 units/mL   No No   Sig: Take 2 5 mL (1,000 Units total) by mouth daily   Patient taking differently: Take 1,200 Units by mouth daily    cromolyn (GASTROCROM) 100 MG/5ML solution   Yes No   Sig:   Start: 11/16/20 8:54:00 EST, only when in [place] 2 vials bid   cyanocobalamin 1,000 mcg/mL   Yes No   diclofenac (VOLTAREN) 75 mg EC tablet   No No   Sig: Take 1 tablet (75 mg total) by mouth 2 (two) times a day for 21 days   diphenhydrAMINE (BENADRYL) 25 mg tablet   Yes No   Sig: Take 50 mg by mouth 2 (two) times a day    ergocalciferol (VITAMIN D2) 50,000 units   Yes No   famotidine (PEPCID) 20 mg/2 5 mL oral suspension   No No   Sig: Take 2 5 mL (20 mg total) by mouth 2 (two) times a day   hydrOXYzine HCL (ATARAX) 25 mg tablet   Yes No   levothyroxine 50 mcg tablet   Yes No   Sig: Take 50 mcg by mouth daily   liothyronine (CYTOMEL) 5 mcg tablet   Yes No   Sig: Take 5 mcg by mouth daily   montelukast (SINGULAIR) 10 mg tablet   No No   Sig: Take 1 tablet (10 mg total) by mouth daily at bedtime   omalizumab (XOLAIR) 150 mg   Yes No   Sig: Inject 300 mg under the skin every 28 days    predniSONE 20 mg tablet   Yes No   Sig: Take by mouth daily   temazepam (RESTORIL) 30 mg capsule   No No   Sig: Take 1 capsule (30 mg total) by mouth daily at bedtime as needed for sleep      Facility-Administered Medications Last Administration Doses Remaining   cyanocobalamin injection 1,000 mcg 10/25/2019 12:04 PM           Past Medical History:   Diagnosis Date    Anxiety     Asthma     Chronic kidney disease     Deep vein thrombosis (HCC)     Disease of thyroid gland     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    Generalized anxiety disorder 8/26/2017    Heart murmur     Migraine     Myocardial infarction [facility])     NSTEMI  pt denies, documented in cardio note    Pancreatitis     sphinger of     Psychiatric disorder     RA (rheumatoid arthritis) ([facility] [address] )     Ulcerative colitis [facility])        Past Surgical History:   Procedure Laterality Date    APPENDECTOMY      CHOLECYSTECTOMY      EGD AND COLONOSCOPY N/A 9/12/2017    Procedure: EGD AND COLONOSCOPY;  Surgeon: Lucy Rosa MD;  [facility]: [facility]; Service: Gastroenterology    ERCP N/A 9/15/2017    Procedure: ENDOSCOPIC RETROGRADE CHOLANGIOPANCREATOGRAPHY (ERCP); Surgeon: Catherine Olvera MD;  [facility]; Service: Gastroenterology    HYSTERECTOMY      KNEE SURGERY Right     LAPAROSCOPIC ENDOMETRIOSIS FULGURATION      ORIF ANKLE FRACTURE Left     UT KNEE SCOPE,MED/LAT MENISECTOMY Left 5/12/2017    Procedure: POSSIBLE MEDIAL MENISECTOMY;  Surgeon: Kendra Jarrell MD;  [facility];  Service: Orthopedics    UT KNEE [address] CART Left 5/12/2017    Procedure: KNEE ARTHROSCOPY EVALUATION, CHONDROPLASTY;  Surgeon: Kendra Jarrell MD;  [facility];  Service: Orthopedics    UT LAP,DIAGNOSTIC ABDOMEN N/A 12/12/2017    Procedure: LAPAROSCOPY DIAGNOSTIC  WITH LYSIS OF ADHESIONS;  Surgeon: Ava Peguero DO;  [facility];  Service: General       Family History   Problem Relation Age of Onset    Ulcerative colitis Mother     Heart failure Father     Neuropathy Father     Macular degeneration Father     Diabetes Father     Asthma Daughter     Hypothyroidism Daughter     Heart disease Maternal Grandmother     Arthritis Maternal Grandmother     Arthritis Paternal Grandmother     Macular degeneration Paternal Grandmother     Lymphoma Paternal Grandfather     Heart failure Paternal Aunt     Heart failure Paternal [de-identified]     Breast cancer Paternal Aunt 53    Breast cancer additional onset Paternal Aunt 62    Hypothyroidism Paternal Aunt     Breast cancer Maternal Aunt     No Known Problems Maternal Grandfather      I have reviewed and agree with the history as documented      E-Cigarette/Vaping    E-Cigarette Use Never User      E-Cigarette/Vaping Substances     Social History Tobacco Use    Smoking status: Never Smoker    Smokeless tobacco: Never Used   Substance Use Topics    Alcohol use: Never     Frequency: Never     Binge frequency: Never    Drug use: Not Currently       Review of Systems   Constitutional: Negative for chills and fever  HENT: Negative for ear pain and sore throat  Eyes: Negative for pain and visual disturbance  Respiratory: Negative for cough and shortness of breath  Cardiovascular: Negative for chest pain and palpitations  Gastrointestinal: Positive for abdominal pain, diarrhea, nausea and vomiting  Genitourinary: Negative for dysuria and hematuria  Musculoskeletal: Negative for arthralgias and back pain  Skin: Negative for color change and rash  Neurological: Negative for seizures and syncope  All other systems reviewed and are negative  Physical Exam  Physical Exam  Vitals signs and nursing note reviewed  Constitutional:       General: She is not in acute distress  Appearance: She is well-developed  She is not ill-appearing, toxic-appearing or diaphoretic  HENT:      Head: Normocephalic and atraumatic  Eyes:      Conjunctiva/sclera: Conjunctivae normal    Neck:      Musculoskeletal: Neck supple  Cardiovascular:      Rate and Rhythm: Normal rate and regular rhythm  Heart sounds: No murmur  Pulmonary:      Effort: Pulmonary effort is normal  No respiratory distress  Breath sounds: Normal breath sounds  Abdominal:      Palpations: Abdomen is soft  Tenderness: There is abdominal tenderness in the right upper quadrant  There is no guarding or rebound  Negative signs include McBurney's sign  Skin:     General: Skin is warm and dry  Capillary Refill: Capillary refill takes less than 2 seconds  Neurological:      General: No focal deficit present  Mental Status: She is alert and oriented to person, place, and time     Psychiatric:         Mood and Affect: Mood normal          Vital Signs  ED Triage Vitals [03/03/21 1333]   Temperature Pulse Respirations Blood Pressure SpO2   99 2 °F (37 3 °C) (!) 110 14 119/78 100 %      Temp Source Heart Rate Source Patient Position - Orthostatic VS BP Location FiO2 (%)   Temporal Monitor Lying Left arm --      Pain Score       6           Vitals:    03/03/21 1645 03/03/21 1700 03/03/21 1730 03/03/21 1745   BP: 116/70 105/68 101/64 105/61   Pulse: 93 92 89 92   Patient Position - Orthostatic VS: Lying Lying Sitting Sitting         Visual Acuity  Visual Acuity      Most Recent Value   L Pupil Size (mm)  3   R Pupil Size (mm)  3          ED Medications  Medications   sodium chloride 0 9 % bolus 1,000 mL (0 mL Intravenous Stopped 3/3/21 1547)   fentanyl citrate (PF) 100 MCG/2ML 25 mcg (25 mcg Intravenous Given 3/3/21 1448)   potassium chloride 20 mEq IVPB (premix) (0 mEq Intravenous Stopped 3/3/21 1753)   methylPREDNISolone sodium succinate (Solu-MEDROL) injection 125 mg (125 mg Intravenous Given 3/3/21 1456)   diphenhydrAMINE (BENADRYL) injection 25 mg (25 mg Intravenous Given 3/3/21 1458)   iohexol (OMNIPAQUE) 350 MG/ML injection (MULTI-DOSE) 100 mL (100 mL Intravenous Given 3/3/21 1602)   sodium chloride 0 9 % bolus 1,000 mL (1,000 mL Intravenous New Bag 3/3/21 1643)   diphenhydrAMINE (BENADRYL) injection 25 mg (25 mg Intravenous Given 3/3/21 1643)   fentanyl citrate (PF) 100 MCG/2ML 25 mcg (25 mcg Intravenous Given 3/3/21 1753)       Diagnostic Studies  Results Reviewed     Procedure Component Value Units Date/Time    C-reactive protein [[id number]]  (Normal) Collected: 03/03/21 1623    Lab Status: Final result Specimen: Blood Updated: 03/03/21 1706     CRP <0 5 mg/L     Clostridium difficile toxin by PCR with EIA [[id number]]     Lab Status: No result Specimen: Stool     Calprotectin,Fecal [[id number]]     Lab Status: No result Specimen: Stool     Stool Enteric Bacterial Panel by PCR [[id number]]     Lab Status: No result Specimen: Stool     Ova and parasite examination [[id number]]     Lab Status: No result Specimen: Stool     UA (URINE) with reflex to Scope [[id number]] Collected: 03/03/21 1547    Lab Status: Final result Specimen: Urine, Clean Catch Updated: 03/03/21 1551     Color, UA Yellow     Clarity, UA Clear     Specific Gravity, UA 1 015     pH, UA 7 0     Leukocytes, UA Negative     Nitrite, UA Negative     Protein, UA Negative mg/dl      Glucose, UA Negative mg/dl      Ketones, UA Negative mg/dl      Urobilinogen, UA 0 2 E U /dl      Bilirubin, UA Negative     Blood, UA Negative    Lactic acid [[id number]]  (Normal) Collected: 03/03/21 1415    Lab Status: Final result Specimen: Blood from Arm, Left Updated: 03/03/21 1441     LACTIC ACID 1 9 mmol/L     Narrative:      Result may be elevated if tourniquet was used during collection      Troponin I [[id number]]  (Normal) Collected: 03/03/21 1415    Lab Status: Final result Specimen: Blood from Arm, Left Updated: 03/03/21 1441     Troponin I <0 02 ng/mL     Comprehensive metabolic panel [[id number]]  (Abnormal) Collected: 03/03/21 1415    Lab Status: Final result Specimen: Blood from Arm, Left Updated: 03/03/21 1438     Sodium 141 mmol/L      Potassium 3 2 mmol/L      Chloride 106 mmol/L      CO2 29 mmol/L      ANION GAP 6 mmol/L      BUN 10 mg/dL      Creatinine 0 84 mg/dL      Glucose 130 mg/dL      Calcium 8 6 mg/dL      Corrected Calcium 9 3 mg/dL      AST 26 U/L      ALT 51 U/L      Alkaline Phosphatase 51 U/L      Total Protein 7 0 g/dL      Albumin 3 1 g/dL      Total Bilirubin 0 30 mg/dL      eGFR 82 ml/min/1 73sq m     Narrative:      [place] guidelines for Chronic Kidney Disease (CKD):     Stage 1 with normal or high GFR (GFR > 90 mL/min/1 73 square meters)    Stage 2 Mild CKD (GFR = 60-89 mL/min/1 73 square meters)    Stage 3A Moderate CKD (GFR = 45-59 mL/min/1 73 square meters)    Stage 3B Moderate CKD (GFR = 30-44 mL/min/1 73 square meters)    Stage 4 Severe CKD (GFR = 15-29 mL/min/1 73 square meters)    Stage 5 End Stage CKD (GFR <15 mL/min/1 73 square meters)  Note: GFR calculation is accurate only with a steady state creatinine    Lipase [[id number]]  (Normal) Collected: 03/03/21 1415    Lab Status: Final result Specimen: Blood from Arm, Left Updated: 03/03/21 1432     Lipase 81 u/L     Magnesium [[id number]]  (Normal) Collected: 03/03/21 1415    Lab Status: Final result Specimen: Blood from Arm, Left Updated: 03/03/21 1432     Magnesium 1 8 mg/dL     Protime-INR [[id number]]  (Normal) Collected: 03/03/21 1415    Lab Status: Final result Specimen: Blood from Arm, Left Updated: 03/03/21 1431     Protime 12 4 seconds      INR 0 94    APTT [[id number]]  (Normal) Collected: 03/03/21 1415    Lab Status: Final result Specimen: Blood from Arm, Left Updated: 03/03/21 1431     PTT 25 seconds     CBC and differential [[id number]]  (Abnormal) Collected: 03/03/21 1415    Lab Status: Final result Specimen: Blood from Arm, Left Updated: 03/03/21 1425     WBC 10 61 Thousand/uL      RBC 4 52 Million/uL      Hemoglobin 13 7 g/dL      Hematocrit 43 3 %      MCV 96 fL      MCH 30 3 pg      MCHC 31 6 g/dL      RDW 13 8 %      MPV 9 0 fL      Platelets 012 Thousands/uL      nRBC 0 /100 WBCs      Neutrophils Relative 57 %      Immat GRANS % 1 %      Lymphocytes Relative 23 %      Monocytes Relative 6 %      Eosinophils Relative 11 %      Basophils Relative 2 %      Neutrophils Absolute 6 19 Thousands/µL      Immature Grans Absolute 0 05 Thousand/uL      Lymphocytes Absolute 2 41 Thousands/µL      Monocytes Absolute 0 68 Thousand/µL      Eosinophils Absolute 1 11 Thousand/µL      Basophils Absolute 0 17 Thousands/µL                  CT abdomen pelvis with contrast   Final Result by Ronda Arias MD (03/03 1628)      Bowel wall thickening of the descending colon extending to the rectum suggestive of colitis of an infectious/inflammatory etiology        2 6 cm fluid attenuation structure in the right adnexal region may represent an ovarian cyst in the absence of a history of oophorectomy  Workstation performed: [id number]                    Procedures  Procedures         ED Course  ED Course as of Mar 03 1806   Wed Mar 03, 2021   1345 Blood Pressure: 119/78   1345 Temperature: 99 2 °F (37 3 °C)   1345 Pulse(!): 110   1345 Respirations: 14   1345 SpO2: 100 %   1420 TT placed to on call GI at pts request      1437 WBC(!): 10 61   1437 Hemoglobin: 13 7   1437 Platelet Count(!): 169   1437 PTT: 25   1437 Magnesium: 1 8   1437 Lipase: 81   1437 INR: 0 94   1439 Magnesium: 1 8   1439 eGFR: 82   1439 Albumin(!): 3 1   1439 Potassium(!): 3 2   1439 Sodium: 141   1447 Troponin I: <0 02   1447 LACTIC ACID: 1 9   1447 Magnesium: 1 8   1448 Starting premedication for CT scan secondary to mast cell disease  65 Awaiting CT scan      1605 Awaiting CT read      1630 IMPRESSION:     Bowel wall thickening of the descending colon extending to the rectum suggestive of colitis of an infectious/inflammatory etiology      2 6 cm fluid attenuation structure in the right adnexal region may represent an ovarian cyst in the absence of a history of oophorectomy  1631 TT sent to GI      1715 TT sent to [facility], Gi recommending admission, note in chart  SBIRT 22yo+      Most Recent Value   SBIRT (24 yo +)   In order to provide better care to our patients, we are screening all of our patients for alcohol and drug use  Would it be okay to ask you these screening questions? Yes Filed at: 03/03/2021 1340   Initial Alcohol Screen: US AUDIT-C    1  How often do you have a drink containing alcohol?  0 Filed at: 03/03/2021 1340   2  How many drinks containing alcohol do you have on a typical day you are drinking? 0 Filed at: 03/03/2021 1340   3a  Male UNDER 65: How often do you have five or more drinks on one occasion? 0 Filed at: 03/03/2021 1340   3b  FEMALE Any Age, or MALE 65+:  How often do you have 4 or more drinks on one occassion? 0 Filed at: 03/03/2021 1340   Audit-C Score  0 Filed at: 03/03/2021 1340   LAUREL: How many times in the past year have you    Used an illegal drug or used a prescription medication for non-medical reasons? Never Filed at: 03/03/2021 1340                    MDM    Disposition  Final diagnoses:   Colitis   Abdominal pain     Time reflects when diagnosis was documented in both MDM as applicable and the Disposition within this note     Time User Action Codes Description Comment    3/3/2021  4:30 PM Fartun Hollis Add [K52 9] Colitis     3/3/2021  6:04 PM Leslie MOREIRA Add [R10 9] Abdominal pain       ED Disposition     ED Disposition Condition Date/Time Comment    Admit Stable Wed Mar 3, 2021  6:05 PM Case was discussed with Dr Sidra Motley and the patient's admission status was agreed to be Admission Status: inpatient status to the service of Dr Sidra Motley   Follow-up Information    None         Patient's Medications   Discharge Prescriptions    No medications on file     No discharge procedures on file      PDMP Review       Value Time User    PDMP Reviewed  Yes 9/17/2019  3:33 PM Mohit Schmitz MD          ED Provider  Electronically Signed by           Citlaly Smith PA-C  03/03/21 4320

## 2021-03-04 ENCOUNTER — HOSPITAL ENCOUNTER (OUTPATIENT)
Dept: INFUSION CENTER | Facility: CLINIC | Age: 46
Discharge: HOME | End: 2021-03-04

## 2021-03-04 ENCOUNTER — APPOINTMENT (INPATIENT)
Dept: ULTRASOUND IMAGING | Facility: HOSPITAL | Age: 46
DRG: 386 | End: 2021-03-04
Payer: COMMERCIAL

## 2021-03-04 PROBLEM — R19.7 DIARRHEA: Status: ACTIVE | Noted: 2017-08-25

## 2021-03-04 LAB
ALBUMIN SERPL BCP-MCNC: 3.2 G/DL (ref 3.5–5)
ALP SERPL-CCNC: 51 U/L (ref 46–116)
ALT SERPL W P-5'-P-CCNC: 45 U/L (ref 12–78)
ANION GAP SERPL CALCULATED.3IONS-SCNC: 10 MMOL/L (ref 4–13)
AST SERPL W P-5'-P-CCNC: 21 U/L (ref 5–45)
BILIRUB SERPL-MCNC: 0.3 MG/DL (ref 0.2–1)
BUN SERPL-MCNC: 7 MG/DL (ref 5–25)
CALCIUM ALBUM COR SERPL-MCNC: 9.5 MG/DL (ref 8.3–10.1)
CALCIUM SERPL-MCNC: 8.9 MG/DL (ref 8.3–10.1)
CHLORIDE SERPL-SCNC: 104 MMOL/L (ref 100–108)
CO2 SERPL-SCNC: 24 MMOL/L (ref 21–32)
CREAT SERPL-MCNC: 0.73 MG/DL (ref 0.6–1.3)
ERYTHROCYTE [DISTWIDTH] IN BLOOD BY AUTOMATED COUNT: 13.5 % (ref 11.6–15.1)
GFR SERPL CREATININE-BSD FRML MDRD: 98 ML/MIN/1.73SQ M
GLUCOSE SERPL-MCNC: 117 MG/DL (ref 65–140)
HCT VFR BLD AUTO: 39.4 % (ref 34.8–46.1)
HGB BLD-MCNC: 12.5 G/DL (ref 11.5–15.4)
MAGNESIUM SERPL-MCNC: 1.9 MG/DL (ref 1.6–2.6)
MCH RBC QN AUTO: 30.2 PG (ref 26.8–34.3)
MCHC RBC AUTO-ENTMCNC: 31.7 G/DL (ref 31.4–37.4)
MCV RBC AUTO: 95 FL (ref 82–98)
PHOSPHATE SERPL-MCNC: 2.7 MG/DL (ref 2.7–4.5)
PLATELET # BLD AUTO: 488 THOUSANDS/UL (ref 149–390)
PMV BLD AUTO: 9.2 FL (ref 8.9–12.7)
POTASSIUM SERPL-SCNC: 4.3 MMOL/L (ref 3.5–5.3)
PROT SERPL-MCNC: 7.1 G/DL (ref 6.4–8.2)
RBC # BLD AUTO: 4.14 MILLION/UL (ref 3.81–5.12)
SODIUM SERPL-SCNC: 138 MMOL/L (ref 136–145)
WBC # BLD AUTO: 13.4 THOUSAND/UL (ref 4.31–10.16)

## 2021-03-04 PROCEDURE — 99232 SBSQ HOSP IP/OBS MODERATE 35: CPT | Performed by: PHYSICIAN ASSISTANT

## 2021-03-04 PROCEDURE — 76856 US EXAM PELVIC COMPLETE: CPT

## 2021-03-04 PROCEDURE — 76830 TRANSVAGINAL US NON-OB: CPT

## 2021-03-04 PROCEDURE — 80053 COMPREHEN METABOLIC PANEL: CPT | Performed by: NURSE PRACTITIONER

## 2021-03-04 PROCEDURE — 97166 OT EVAL MOD COMPLEX 45 MIN: CPT

## 2021-03-04 PROCEDURE — 83735 ASSAY OF MAGNESIUM: CPT | Performed by: NURSE PRACTITIONER

## 2021-03-04 PROCEDURE — 84100 ASSAY OF PHOSPHORUS: CPT | Performed by: NURSE PRACTITIONER

## 2021-03-04 PROCEDURE — 85027 COMPLETE CBC AUTOMATED: CPT | Performed by: NURSE PRACTITIONER

## 2021-03-04 PROCEDURE — 97162 PT EVAL MOD COMPLEX 30 MIN: CPT

## 2021-03-04 RX ORDER — DICLOFENAC SODIUM 75 MG/1
75 TABLET, DELAYED RELEASE ORAL 2 TIMES DAILY
Status: DISCONTINUED | OUTPATIENT
Start: 2021-03-04 | End: 2021-03-12 | Stop reason: HOSPADM

## 2021-03-04 RX ORDER — LORAZEPAM 2 MG/ML
0.5 INJECTION INTRAMUSCULAR EVERY 4 HOURS PRN
Status: DISCONTINUED | OUTPATIENT
Start: 2021-03-04 | End: 2021-03-11

## 2021-03-04 RX ORDER — ONDANSETRON 2 MG/ML
4 INJECTION INTRAMUSCULAR; INTRAVENOUS EVERY 6 HOURS PRN
Status: DISCONTINUED | OUTPATIENT
Start: 2021-03-04 | End: 2021-03-11

## 2021-03-04 RX ORDER — MONTELUKAST SODIUM 10 MG/1
10 TABLET ORAL 2 TIMES DAILY
Status: DISCONTINUED | OUTPATIENT
Start: 2021-03-04 | End: 2021-03-12 | Stop reason: HOSPADM

## 2021-03-04 RX ORDER — CHOLECALCIFEROL (VITAMIN D3) 10(400)/ML
1200 DROPS ORAL DAILY
Status: DISCONTINUED | OUTPATIENT
Start: 2021-03-04 | End: 2021-03-12 | Stop reason: HOSPADM

## 2021-03-04 RX ORDER — CETIRIZINE HYDROCHLORIDE 10 MG/1
10 TABLET ORAL DAILY
Status: DISCONTINUED | OUTPATIENT
Start: 2021-03-04 | End: 2021-03-12 | Stop reason: HOSPADM

## 2021-03-04 RX ORDER — HYDROXYZINE HYDROCHLORIDE 25 MG/1
25 TABLET, FILM COATED ORAL EVERY 6 HOURS PRN
Status: DISCONTINUED | OUTPATIENT
Start: 2021-03-04 | End: 2021-03-12 | Stop reason: HOSPADM

## 2021-03-04 RX ORDER — DIPHENHYDRAMINE HYDROCHLORIDE 50 MG/ML
50 INJECTION INTRAMUSCULAR; INTRAVENOUS EVERY 6 HOURS PRN
Status: DISCONTINUED | OUTPATIENT
Start: 2021-03-04 | End: 2021-03-04

## 2021-03-04 RX ORDER — LIOTHYRONINE SODIUM 5 UG/1
5 TABLET ORAL DAILY
Status: DISCONTINUED | OUTPATIENT
Start: 2021-03-04 | End: 2021-03-12 | Stop reason: HOSPADM

## 2021-03-04 RX ORDER — DIPHENHYDRAMINE HYDROCHLORIDE 50 MG/ML
50 INJECTION INTRAMUSCULAR; INTRAVENOUS EVERY 4 HOURS PRN
Status: DISCONTINUED | OUTPATIENT
Start: 2021-03-04 | End: 2021-03-12 | Stop reason: HOSPADM

## 2021-03-04 RX ORDER — IBUPROFEN 200 MG
950 CAPSULE ORAL DAILY
Status: DISCONTINUED | OUTPATIENT
Start: 2021-03-04 | End: 2021-03-12 | Stop reason: HOSPADM

## 2021-03-04 RX ORDER — TEMAZEPAM 30 MG/1
30 CAPSULE ORAL
Status: DISCONTINUED | OUTPATIENT
Start: 2021-03-04 | End: 2021-03-12 | Stop reason: HOSPADM

## 2021-03-04 RX ORDER — HYDROMORPHONE HCL/PF 1 MG/ML
0.5 SYRINGE (ML) INJECTION
Status: DISCONTINUED | OUTPATIENT
Start: 2021-03-04 | End: 2021-03-07

## 2021-03-04 RX ORDER — CROMOLYN SODIUM 100 MG/5ML
200 SOLUTION, CONCENTRATE ORAL
Status: DISCONTINUED | OUTPATIENT
Start: 2021-03-04 | End: 2021-03-12 | Stop reason: HOSPADM

## 2021-03-04 RX ORDER — CYANOCOBALAMIN 1000 UG/ML
1000 INJECTION INTRAMUSCULAR; SUBCUTANEOUS ONCE
Status: COMPLETED | OUTPATIENT
Start: 2021-03-04 | End: 2021-03-04

## 2021-03-04 RX ORDER — DIPHENHYDRAMINE HYDROCHLORIDE 50 MG/ML
25 INJECTION INTRAMUSCULAR; INTRAVENOUS EVERY 4 HOURS PRN
Status: DISCONTINUED | OUTPATIENT
Start: 2021-03-04 | End: 2021-03-04

## 2021-03-04 RX ORDER — LEVOTHYROXINE SODIUM 0.05 MG/1
50 TABLET ORAL
Status: DISCONTINUED | OUTPATIENT
Start: 2021-03-05 | End: 2021-03-12 | Stop reason: HOSPADM

## 2021-03-04 RX ORDER — ERGOCALCIFEROL 1.25 MG/1
50000 CAPSULE ORAL WEEKLY
Status: DISCONTINUED | OUTPATIENT
Start: 2021-03-10 | End: 2021-03-12 | Stop reason: HOSPADM

## 2021-03-04 RX ADMIN — CYANOCOBALAMIN 1000 MCG: 1000 INJECTION, SOLUTION INTRAMUSCULAR at 14:39

## 2021-03-04 RX ADMIN — HYDROMORPHONE HYDROCHLORIDE 0.5 MG: 1 INJECTION, SOLUTION INTRAMUSCULAR; INTRAVENOUS; SUBCUTANEOUS at 22:35

## 2021-03-04 RX ADMIN — Medication 1200 UNITS: at 14:36

## 2021-03-04 RX ADMIN — LORAZEPAM 0.5 MG: 2 INJECTION INTRAMUSCULAR; INTRAVENOUS at 22:35

## 2021-03-04 RX ADMIN — HYDROMORPHONE HYDROCHLORIDE 0.5 MG: 1 INJECTION, SOLUTION INTRAMUSCULAR; INTRAVENOUS; SUBCUTANEOUS at 02:14

## 2021-03-04 RX ADMIN — HYDROMORPHONE HYDROCHLORIDE 0.5 MG: 1 INJECTION, SOLUTION INTRAMUSCULAR; INTRAVENOUS; SUBCUTANEOUS at 09:12

## 2021-03-04 RX ADMIN — SODIUM CHLORIDE 75 ML/HR: 0.9 INJECTION, SOLUTION INTRAVENOUS at 15:09

## 2021-03-04 RX ADMIN — LORAZEPAM 0.5 MG: 2 INJECTION INTRAMUSCULAR; INTRAVENOUS at 01:42

## 2021-03-04 RX ADMIN — DIPHENHYDRAMINE HYDROCHLORIDE 50 MG: 50 INJECTION, SOLUTION INTRAMUSCULAR; INTRAVENOUS at 02:49

## 2021-03-04 RX ADMIN — POTASSIUM CHLORIDE 20 MEQ: 14.9 INJECTION, SOLUTION INTRAVENOUS at 04:08

## 2021-03-04 RX ADMIN — HYDROMORPHONE HYDROCHLORIDE 0.5 MG: 1 INJECTION, SOLUTION INTRAMUSCULAR; INTRAVENOUS; SUBCUTANEOUS at 05:24

## 2021-03-04 RX ADMIN — HYDROMORPHONE HYDROCHLORIDE 0.5 MG: 1 INJECTION, SOLUTION INTRAMUSCULAR; INTRAVENOUS; SUBCUTANEOUS at 18:22

## 2021-03-04 RX ADMIN — DIPHENHYDRAMINE HYDROCHLORIDE 50 MG: 50 INJECTION, SOLUTION INTRAMUSCULAR; INTRAVENOUS at 19:54

## 2021-03-04 RX ADMIN — SODIUM CHLORIDE 75 ML/HR: 0.9 INJECTION, SOLUTION INTRAVENOUS at 00:13

## 2021-03-04 RX ADMIN — DIPHENHYDRAMINE HYDROCHLORIDE 50 MG: 50 INJECTION, SOLUTION INTRAMUSCULAR; INTRAVENOUS at 11:26

## 2021-03-04 RX ADMIN — HYDROMORPHONE HYDROCHLORIDE 0.5 MG: 1 INJECTION, SOLUTION INTRAMUSCULAR; INTRAVENOUS; SUBCUTANEOUS at 13:04

## 2021-03-04 RX ADMIN — LORAZEPAM 0.5 MG: 2 INJECTION INTRAMUSCULAR; INTRAVENOUS at 18:22

## 2021-03-04 RX ADMIN — MONTELUKAST 10 MG: 10 TABLET, FILM COATED ORAL at 20:56

## 2021-03-04 RX ADMIN — LORAZEPAM 0.5 MG: 2 INJECTION INTRAMUSCULAR; INTRAVENOUS at 07:47

## 2021-03-04 RX ADMIN — CETIRIZINE HYDROCHLORIDE 10 MG: 10 TABLET, FILM COATED ORAL at 14:37

## 2021-03-04 RX ADMIN — DIPHENHYDRAMINE HYDROCHLORIDE 25 MG: 50 INJECTION, SOLUTION INTRAMUSCULAR; INTRAVENOUS at 06:55

## 2021-03-04 RX ADMIN — DIPHENHYDRAMINE HYDROCHLORIDE 50 MG: 50 INJECTION, SOLUTION INTRAMUSCULAR; INTRAVENOUS at 15:06

## 2021-03-04 RX ADMIN — LORAZEPAM 0.5 MG: 2 INJECTION INTRAMUSCULAR; INTRAVENOUS at 13:03

## 2021-03-04 RX ADMIN — POTASSIUM CHLORIDE 20 MEQ: 14.9 INJECTION, SOLUTION INTRAVENOUS at 00:12

## 2021-03-04 NOTE — H&P
H&P- Arturo Bacca 1975, 48 y o  female MRN: [id number]    Unit/Bed#: 407-01 Encounter: [id number]    Primary Care Provider: Jennifer Weinberg MD   Date and time admitted to hospital: 3/3/2021  1:26 PM        Abdominal pain  Assessment & Plan  Patient admits to right upper quadrant pain accompanied with nausea vomiting and 15 diarrheal episodes since yesterrday  P r n  Zofran    Mast cell activation syndrome [facility])  Assessment & Plan  Patient is seen/managed by physician in [place]   -Dr Carrero [facility] - [phone]  continue with IV Benadryl as needed         Leukocytosis  Assessment & Plan  As evidence by white blood cell count of 10 61  Patient with known mast cell activation and Crohn's utilizing home steroid  Trend CBC daily        Thrombocytosis ([address] )  Assessment & Plan  As evidence by a platelet count of 527  Trend CBC daily  Heparin 5000 units q 8 hours for DVT PPX    CHF (congestive heart failure) ([facility])  Assessment & Plan  Wt Readings from Last 3 Encounters:   03/03/21 65 9 kg (145 lb 4 5 oz)   01/20/21 67 1 kg (148 lb)   12/30/20 68 kg (150 lb)             Hypothyroidism (acquired)  Assessment & Plan  Patient reportedly not taking levothyroxine for last several weeks    Generalized anxiety disorder  Assessment & Plan  Patient is very anxious speech is rapid and she is agitated crying at times  Patient reports can only take her own home prescription pills-currently will provide Ativan 0 5 p r n  As needed for anxiety    Hypokalemia  Assessment & Plan  As evidence by potassium level 3 2  Potassium repleted with 40 mEq IV piggyback  Trend potassium q d    Replete as needed    Crohn's colitis [facility])  Assessment & Plan  Patient admits to 15 diarrheal episodes over the past 24 hours  Enteric panel ordered  C diff ordered  Patient reports no diarrhea episodes since ER  Holding off antibiotics and steroids for now pending stool samples  GI consulted          VTE Prophylaxis: Heparin  / sequential compression device Code Status: FULL  POLST: POLST is not applicable to this patient    Anticipated Length of Stay:  Patient will be admitted on an Inpatient basis with an anticipated length of stay of  Greater than 2 midnights  Justification for Hospital Stay:  Abdominal pain-colitis    Total Time for Visit, including Counseling / Coordination of Care: 45 minutes  Greater than 50% of this total time spent on direct patient counseling and coordination of care  Chief Complaint:   Abdominal pain for at least a week accompanied with nausea-vomiting-diarrhea    History of Present Illness:    Gila Harrison is a 48 y o  female with a past medical history of ulcer colitis, rheumatoid arthritis, pancreatitis, heart murmur, generalized anxiety disorder hypothyroidism chronic kidney disease asthma anxiety and mass cell activation disorder presented to the emergency room for evaluation under the direction of her GI doctor  Patient reports right upper quadrant pain patient also admits to nausea vomiting and 15 diarrheal episode since yesterday  GI consulted by emergency room doctor and did a formal consultation at that time they recommended admit to med [facility] for observation, so collect C diff, stool culture ova parasites, fecal calproten and [id number] on CT scan, provide antiemetics as needed provide anti diarrheal is 1 stool studies are obtained and provide analgesia as needed  Images and labs were collected emergency room-see below-significant findings included mild leukocytosis with a white blood cell count of 10 61 hypokalemia with a potassium level 3 2  Time of my physical assessment patient is very anxious talking very fast stating that she is having anaphylaxis   Patient states that everything in the room is causing her to have anaphylaxis, the odors, the  and the smell of my soap    Patient is very anxious kind of talking in circles stated that the normal saline in the IV is given her a reaction then further in the conversation reports that she can only take IV medications and [facility] clears her p o  Medications that she personally brought from home  Patient states that she can only take her medications because they are from the  that she is use to and she has multiple allergies  Allergies including corn  Dextrin  For this evening all p o  Medications will be held awaiting pharmacy verification  Patient will be given IV Ativan, IV Dilaudid, and IV Benadryl  Review of Systems:    Review of Systems   Gastrointestinal: Positive for abdominal pain (ruq), diarrhea, nausea and vomiting  All other systems reviewed and are negative  Past Medical and Surgical History:     Past Medical History:   Diagnosis Date    Anxiety     Asthma     Chronic kidney disease     Deep vein thrombosis ([facility] )     Disease of thyroid gland     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    Generalized anxiety disorder 8/26/2017    Heart murmur     Migraine     Myocardial infarction [facility])     NSTEMI  pt denies, documented in cardio note    Pancreatitis     sphinger of     Psychiatric disorder     RA (rheumatoid arthritis) ([facility] )     Ulcerative colitis ([facility] )        Past Surgical History:   Procedure Laterality Date    APPENDECTOMY      CHOLECYSTECTOMY      EGD AND COLONOSCOPY N/A 9/12/2017    Procedure: EGD AND COLONOSCOPY;  Surgeon: Deidre Min MD;  [facility]; Service: Gastroenterology    ERCP N/A 9/15/2017    Procedure: ENDOSCOPIC RETROGRADE CHOLANGIOPANCREATOGRAPHY (ERCP); Surgeon: Madeline Ceja MD;  [facility];   Service: Gastroenterology    HYSTERECTOMY      KNEE SURGERY Right     LAPAROSCOPIC ENDOMETRIOSIS FULGURATION      ORIF ANKLE FRACTURE Left     LA KNEE SCOPE,MED/LAT MENISECTOMY Left 5/12/2017    Procedure: POSSIBLE MEDIAL MENISECTOMY;  Surgeon: Nicole Vides MD;  Location: [facility];  Service: Orthopedics    LA KNEE [address] CART Left 5/12/2017 Procedure: KNEE ARTHROSCOPY EVALUATION, CHONDROPLASTY;  Surgeon: Aiden Isaac MD;  [facility];  Service: Orthopedics    ND LAP,DIAGNOSTIC ABDOMEN N/A 12/12/2017    Procedure: LAPAROSCOPY DIAGNOSTIC  WITH LYSIS OF ADHESIONS;  Surgeon: Jovanny Ontiveros DO;  Location:  MAIN OR;  Service: General       Meds/Allergies:    Prior to Admission medications    Medication Sig Start Date End Date Taking?  Authorizing Provider   acetaminophen (TYLENOL) 500 mg tablet Take 1,000 mg by mouth every 6 (six) hours as needed for mild pain   Yes Historical Provider, MD   budesonide (ENTOCORT EC) 3 MG capsule Take 3 mg by mouth 3 (three) times a day Before meals   Yes Historical Provider, MD   busPIRone (BUSPAR) 5 mg tablet TAKE ONE TABLET BY MOUTH EVERY 24 HOURS 1/4/21  Yes Henry Garrido MD   calcium citrate (CALCITRATE) 950 (200 Ca) MG tablet Take 1 tablet (950 mg total) by mouth daily  Patient taking differently: Take 1 tablet by mouth daily  1/20/21 4/20/21 Yes Zakiya Li MD   cetirizine (ZyrTEC) 10 mg tablet Take 10 mg by mouth daily    Yes Historical Provider, MD   cholecalciferol (VITAMIN D) 400 units/mL Take 2 5 mL (1,000 Units total) by mouth daily  Patient taking differently: Take 1,200 Units by mouth daily  10/21/19  Yes Sheeba Triana PA-C   diclofenac (VOLTAREN) 75 mg EC tablet Take 1 tablet (75 mg total) by mouth 2 (two) times a day for 21 days 1/20/21 3/3/21 Yes Zakiya Li MD   diphenhydrAMINE (BENADRYL) 25 mg tablet Take 50 mg by mouth 2 (two) times a day    Yes Historical Provider, MD   ergocalciferol (VITAMIN D2) 50,000 units  1/5/21  Yes Historical Provider, MD   hydrOXYzine HCL (ATARAX) 25 mg tablet Take 25 mg by mouth 4 (four) times a day  11/16/20  Yes Historical Provider, MD   levalbuterol (XOPENEX HFA) 45 mcg/act inhaler Inhale 1-2 puffs every 4 (four) hours as needed for shortness of breath   Yes Historical Provider, MD   levothyroxine 50 mcg tablet Take 50 mcg by mouth daily Yes Historical Provider, MD   liothyronine (CYTOMEL) 5 mcg tablet Take 5 mcg by mouth daily   Yes Historical Provider, MD   LORazepam (ATIVAN) 1 mg tablet Take 1 tablet (1 mg total) by mouth every 8 (eight) hours as needed for anxiety 2/4/21  Yes Jennifer Verma MD   montelukast (SINGULAIR) 10 mg tablet Take 1 tablet (10 mg total) by mouth daily at bedtime 10/28/20  Yes JONATHAN Smith   predniSONE 20 mg tablet Take 40 mg by mouth daily    Yes Historical Provider, MD   butalbital-acetaminophen-caffeine (FIORICET,ESGIC) -40 mg per tablet Take 1 tablet by mouth every 8 (eight) hours as needed for headaches 2/16/21   Jennifer Verma MD   cromolyn (GASTROCROM) 100 MG/5ML solution   Start: 11/16/20 8:54:00 EST, only when in [place] 2 vials bid 11/16/20   Historical Provider, MD   cyanocobalamin 1,000 mcg/mL  1/5/21   Historical Provider, MD   EPINEPHrine (EPIPEN 2-MARYSOL IJ) EpiPen   prn    Historical Provider, MD   NON FORMULARY immunoglobin sq 6g 30 ml once a week    Historical Provider, MD   omalizumab Ang Ortiz) 150 mg Inject 300 mg under the skin every 28 days     Historical Provider, MD   Riboflavin (Vitamin B-2) 100 MG TABS Take 4 tablets (400 mg total) by mouth daily  Patient not taking: Reported on 3/3/2021 1/20/21 4/20/21  Magdaleno Aguilar MD   Stelara 90 MG/ML subcutaneous injection  12/22/20   Historical Provider, MD   temazepam (RESTORIL) 30 mg capsule Take 1 capsule (30 mg total) by mouth daily at bedtime as needed for sleep  Patient taking differently: Take 30 mg by mouth daily at bedtime as needed for sleep  2/8/21   Marissa Chandler MD   famotidine (PEPCID) 20 mg/2 5 mL oral suspension Take 2 5 mL (20 mg total) by mouth 2 (two) times a day 10/28/20 3/3/21  JONATHAN Smith     I have reviewed home medications using allscripts  Allergies:    Allergies   Allergen Reactions    Amitriptyline Anaphylaxis     According to the patient she had nphylaxis    Aspergillus Fumigatus Other (See Comments), Hives and Swelling    Azathioprine Other (See Comments) and Anaphylaxis     pancreatitis  According to patient she needed Epipen    Eggs Or Egg-Derived Products (Food Allergy) Anaphylaxis    Sumatriptan Anaphylaxis     Bradycardia, sob, back pressure, head pain,throat tightness  According to the patient she had anphylaxis    Contrast [Iodinated Diagnostic Agents]      Pt states needs to be premedicated prior to injection    Corn Dextrin      Any corn based products    Corn Oil (Food Allergy) (Food Allergy) Hives    Gelatin (Food Allergy)     Histamine      Intolerance      Humira [Adalimumab] Other (See Comments)     "I develop drug induced lupus"    Ibuprofen Hives and Throat Swelling    Lidocaine     Malto Dextrin [Dextrin]     Other      Gel caps, maltodextrose, dextrose,grapes    Penicillium Notatum     Remicade [Infliximab] Other (See Comments)     "I develop drug induced lupus"    Sulfa Antibiotics Hives and Other (See Comments)    Sulfate     Sulfites (Food Allergy)        Social History:     Marital Status: /Civil Union   Occupation:  Noncontributory    Substance Use History:   Social History     Substance and Sexual Activity   Alcohol Use Never    Frequency: Never    Binge frequency: Never     Social History     Tobacco Use   Smoking Status Never Smoker   Smokeless Tobacco Never Used     Social History     Substance and Sexual Activity   Drug Use Not Currently       Family History:    Family History   Problem Relation Age of Onset    Ulcerative colitis Mother     Heart failure Father     Neuropathy Father     Macular degeneration Father     Diabetes Father     Asthma Daughter     Hypothyroidism Daughter     Heart disease Maternal Grandmother     Arthritis Maternal Grandmother     Arthritis Paternal Grandmother     Macular degeneration Paternal Grandmother     Lymphoma Paternal Grandfather     Heart failure Paternal Aunt     Heart failure Paternal Aunt     Breast cancer Paternal Aunt 47    Breast cancer additional onset Paternal Aunt 62    Hypothyroidism Paternal Aunt     Breast cancer Maternal Aunt     No Known Problems Maternal Grandfather        Physical Exam:     Vitals:   Blood Pressure: 141/97 (03/03/21 1841)  Pulse: 94 (03/03/21 1841)  Temperature: 99 4 °F (37 4 °C) (03/03/21 1841)  Temp Source: Temporal (03/03/21 1333)  Respirations: 18 (03/03/21 1841)  Height: 5' 5 25" (165 7 cm) (03/03/21 1333)  Weight - Scale: 65 9 kg (145 lb 4 5 oz) (03/03/21 1333)  SpO2: 95 % (03/03/21 1841)    Physical Exam  Constitutional:       Appearance: Normal appearance  HENT:      Head: Normocephalic and atraumatic  Eyes:      Extraocular Movements: Extraocular movements intact  Conjunctiva/sclera: Conjunctivae normal    Cardiovascular:      Rate and Rhythm: Regular rhythm  Tachycardia present  Pulmonary:      Effort: Pulmonary effort is normal       Breath sounds: Decreased breath sounds present  Abdominal:      General: Bowel sounds are normal  There is no distension  Palpations: Abdomen is soft  Tenderness: There is abdominal tenderness (RUQ)  Musculoskeletal:         General: No swelling  Skin:     General: Skin is warm and dry  Capillary Refill: Capillary refill takes 2 to 3 seconds  Findings: Rash (neck) present  Neurological:      General: No focal deficit present  Mental Status: She is alert and oriented to person, place, and time  GCS: GCS eye subscore is 4  GCS verbal subscore is 5  GCS motor subscore is 6  Psychiatric:         Mood and Affect: Mood is anxious  Speech: Speech is rapid and pressured  Behavior: Behavior is agitated  Additional Data:     Lab Results: I have personally reviewed pertinent reports        Results from last 7 days   Lab Units 03/03/21  1415   WBC Thousand/uL 10 61*   HEMOGLOBIN g/dL 13 7   HEMATOCRIT % 43 3   PLATELETS Thousands/uL 519*   NEUTROS PCT % 57   LYMPHS PCT % 23   MONOS PCT % 6   EOS PCT % 11*     Results from last 7 days   Lab Units 03/03/21  1415   POTASSIUM mmol/L 3 2*   CHLORIDE mmol/L 106   CO2 mmol/L 29   BUN mg/dL 10   CREATININE mg/dL 0 84   CALCIUM mg/dL 8 6   ALK PHOS U/L 51   ALT U/L 51   AST U/L 26     Results from last 7 days   Lab Units 03/03/21  1415   INR  0 94       Imaging: I have personally reviewed pertinent reports  Ct Abdomen Pelvis With Contrast    Result Date: 3/3/2021  Narrative: CT ABDOMEN AND PELVIS WITH IV CONTRAST INDICATION:   Abdominal pain, acute, nonlocalized right sided abd pain  COMPARISON:  CT chest/abdomen/pelvis dated 10/18/2020  TECHNIQUE:  CT examination of the abdomen and pelvis was performed  Axial, sagittal, and coronal 2D reformatted images were created from the source data and submitted for interpretation  Radiation dose length product (DLP) for this visit:  588 2 mGy-cm   This examination, like all CT scans performed in the [facility], was performed utilizing techniques to minimize radiation dose exposure, including the use of iterative reconstruction and automated exposure control  IV Contrast:  100 mL of iohexol (OMNIPAQUE) Enteric Contrast:  Enteric contrast was not administered  FINDINGS: ABDOMEN LOWER CHEST:  No clinically significant abnormality identified in the visualized lower chest  LIVER/BILIARY TREE:  Unremarkable  GALLBLADDER:  Gallbladder is surgically absent  SPLEEN:  Unremarkable  PANCREAS:  Unremarkable  ADRENAL GLANDS:  Unremarkable  KIDNEYS/URETERS:  One or more sharply circumscribed subcentimeter renal hypodensities are noted  These lesions are too small to accurately characterize, but are statistically most likely to represent benign cortical renal cyst(s)  According to the guidelines published in the [facility] Paper of the ACR Incidental Findings Committee (Radiology 2010), no further workup of these lesions is recommended  No hydronephrosis   STOMACH AND BOWEL:  No evidence for bowel obstruction  There is circumferential bowel wall thickening of the descending colon extending to the rectum suggestive of colitis  APPENDIX:  No findings to suggest appendicitis  ABDOMINOPELVIC CAVITY:  No ascites  No pneumoperitoneum  No lymphadenopathy  VESSELS:  Unremarkable for patient's age  PELVIS REPRODUCTIVE ORGANS:  Status post hysterectomy  2 6 cm fluid attenuation structure in the right adnexal region may represent an ovarian cyst  URINARY BLADDER:  Unremarkable  ABDOMINAL WALL/INGUINAL REGIONS:  Unremarkable  OSSEOUS STRUCTURES:  No acute fracture or destructive osseous lesion  Impression: Bowel wall thickening of the descending colon extending to the rectum suggestive of colitis of an infectious/inflammatory etiology  2 6 cm fluid attenuation structure in the right adnexal region may represent an ovarian cyst in the absence of a history of oophorectomy  Workstation performed: [id number]         Epic / [facility] Everywhere Records Reviewed: Yes     ** Please Note: This note has been constructed using a voice recognition system   **

## 2021-03-04 NOTE — CASE MANAGEMENT
Cm met with the patient to evaluate the patients prior function and living situation and any barriers to d/c and form a safe d/c plan  Cm also evaluated the patient for any services in the home or needs for services  Pt resides at home with her spouse/family in a single story house  Has either 3 or 7 VIMAL  Pt is independent with her adls/ambulation  No services or DME  Pt and spouse drive  PCP is Luna Barrios and pharmacy is [facility]  Plans at this time are home on dc with OP follow up  Cm will follow and assist in dc planning

## 2021-03-04 NOTE — ASSESSMENT & PLAN NOTE
Patient is less anxious today  Patient reports can only take her own home prescription pills-currently will provide Ativan 0 5 p r n  As needed for anxiety  Will restart home regimen with patient's home prescriptions

## 2021-03-04 NOTE — ASSESSMENT & PLAN NOTE
Patient admits to right upper quadrant pain accompanied with nausea vomiting and 15 diarrheal episodes since yesterrday  P r n   Zofran

## 2021-03-04 NOTE — ASSESSMENT & PLAN NOTE
As evidence by white blood cell count of 10 61  Patient with known mast cell activation and Crohn's utilizing home steroid  Trend CBC daily

## 2021-03-04 NOTE — ASSESSMENT & PLAN NOTE
Patient admits to 15 diarrheal episodes over the past 24 hours  Enteric panel ordered  C diff ordered  Patient reports no diarrhea episodes since ER  Holding off antibiotics and steroids for now pending stool samples  GI consulted

## 2021-03-04 NOTE — PROGRESS NOTES
Progress Note - Andre Jenkins 1975, 48 y o  female MRN: [id number]    Unit/Bed#: 407-01 Encounter: [id number]    Primary Care Provider: Tha Li MD   Date and time admitted to hospital: 3/3/2021  1:26 PM        Diarrhea  Assessment & Plan  Patient admits to 15 diarrheal episodes over the past 24 hours  History of IBD  Enteric panel, C diff, and other stool studies per GI ordered- specimen not collected given diarrhea has currently resolved  Holding off antibiotics and steroids for now pending stool samples  GI consultation appreciated  Mast cell activation syndrome [facility])  Assessment & Plan  Patient is seen/managed by physician in [place]   -Dr Cynthia Laws - [phone]  continue with IV Benadryl as needed         * Abdominal pain  Assessment & Plan  Patient admits to right upper quadrant pain accompanied with nausea vomiting and 15 diarrheal episodes since yesterrday  P r n  Zofran  Stool studies ordered    CT abdomen/pelvis: Bowel wall thickening of the descending colon extending to the rectum suggestive of colitis of an infectious/inflammatory etiology  2 6 cm fluid attenuation structure in the right adnexal region may represent an ovarian cyst in the absence of a history of oophorectomy  Check transvaginal US  Leukocytosis  Assessment & Plan  As evidence by white blood cell count of 10 61 on admission, trending up at 13 40  Patient with known mast cell activation and IBD utilizing home steroid  Trend CBC daily        Thrombocytosis (HCC)  Assessment & Plan  Platelet count of 553 on admission  Platelet count of 272 today  Trend CBC daily  Heparin 5000 units q 8 hours for DVT PPX    Hypothyroidism (acquired)  Assessment & Plan  Patient reportedly not taking levothyroxine for last several weeks    Generalized anxiety disorder  Assessment & Plan  Patient is less anxious today  Patient reports can only take her own home prescription pills-currently will provide Ativan 0 5 p r n   As needed for anxiety  Will restart home regimen with patient's home prescriptions  Hypokalemia  Assessment & Plan  Potassium level 3 2 on admission  Resolved since receiving IV Potassium 40 mEq  Trend potassium q d  Replete as needed      VTE Pharmacologic Prophylaxis:   Pharmacologic: Patient has refused VTE prophylaxis  Mechanical VTE Prophylaxis in Place: Yes    Patient Centered Rounds: I have performed bedside rounds with nursing staff today  Discussions with Specialists or Other Care Team Provider: nursing, GI, CM    Education and Discussions with Family / Patient: patient    Time Spent for Care: 20 minutes  More than 50% of total time spent on counseling and coordination of care as described above  Current Length of Stay: 1 day(s)    Current Patient Status: Inpatient   Certification Statement: The patient will continue to require additional inpatient hospital stay due to need for IV fluids, pain control, pending stool studies, likely need for IV steroids, and re-eval by GI    Discharge Plan: TBD    Code Status: Level 1 - Full Code      Subjective: The patient was seen and examined  The patient states her diarrhea has resolved so far today  She states she continues to have abdominal pain which improved with pain medications but returns when medication wears off  Objective:     Vitals:   Temp (24hrs), Av 1 °F (37 3 °C), Min:98 7 °F (37 1 °C), Max:99 4 °F (37 4 °C)    Temp:  [98 7 °F (37 1 °C)-99 4 °F (37 4 °C)] 98 7 °F (37 1 °C)  HR:  [] 75  Resp:  [14-24] 18  BP: ()/(51-98) 108/86  SpO2:  [95 %-100 %] 98 %  Body mass index is 23 99 kg/m²  Input and Output Summary (last 24 hours): Intake/Output Summary (Last 24 hours) at 3/4/2021 1247  Last data filed at 3/3/2021 1753  Gross per 24 hour   Intake 1153 33 ml   Output --   Net 1153 33 ml       Physical Exam:     Physical Exam  Vitals signs and nursing note reviewed  Constitutional:       General: She is awake  Appearance: Normal appearance  Cardiovascular:      Rate and Rhythm: Normal rate and regular rhythm  Pulmonary:      Effort: Pulmonary effort is normal       Breath sounds: Normal breath sounds  No wheezing, rhonchi or rales  Abdominal:      General: Bowel sounds are normal       Palpations: Abdomen is soft  Tenderness: There is abdominal tenderness in the right upper quadrant, right lower quadrant and periumbilical area  There is no guarding or rebound  Skin:     General: Skin is warm and dry  Neurological:      General: No focal deficit present  Mental Status: She is alert and oriented to person, place, and time  Psychiatric:         Attention and Perception: Attention normal          Mood and Affect: Mood is anxious  Speech: Speech normal          Behavior: Behavior is cooperative  Additional Data:     Labs:    Results from last 7 days   Lab Units 03/04/21  0423 03/03/21  1415   WBC Thousand/uL 13 40* 10 61*   HEMOGLOBIN g/dL 12 5 13 7   HEMATOCRIT % 39 4 43 3   PLATELETS Thousands/uL 488* 519*   NEUTROS PCT %  --  57   LYMPHS PCT %  --  23   MONOS PCT %  --  6   EOS PCT %  --  11*     Results from last 7 days   Lab Units 03/04/21  0423   SODIUM mmol/L 138   POTASSIUM mmol/L 4 3   CHLORIDE mmol/L 104   CO2 mmol/L 24   BUN mg/dL 7   CREATININE mg/dL 0 73   ANION GAP mmol/L 10   CALCIUM mg/dL 8 9   ALBUMIN g/dL 3 2*   TOTAL BILIRUBIN mg/dL 0 30   ALK PHOS U/L 51   ALT U/L 45   AST U/L 21   GLUCOSE RANDOM mg/dL 117     Results from last 7 days   Lab Units 03/03/21  1415   INR  0 94             Results from last 7 days   Lab Units 03/03/21  1415   LACTIC ACID mmol/L 1 9           * I Have Reviewed All Lab Data Listed Above  * Additional Pertinent Lab Tests Reviewed:  All Labs Within Last 24 Hours Reviewed    Imaging:    Imaging Reports Reviewed Today Include: none  Imaging Personally Reviewed by Myself Includes:  none    Recent Cultures (last 7 days):           Last 24 Hours Medication List:   Current Facility-Administered Medications   Medication Dose Route Frequency Provider Last Rate    busPIRone  5 mg Oral Q24H JONATHAN Jim      calcium citrate  950 mg Oral Daily Fransisco Constant, PA-C      cetirizine  10 mg Oral Daily Fransisco Constant, PA-C      cholecalciferol  1,200 Units Oral Daily Fransisco Constant, PA-C      cromolyn  200 mg Oral 4x Daily (AC & HS) Fransisco Constant, PA-C      cyanocobalamin  1,000 mcg Intramuscular Once Fransisco Constant, PA-C      diclofenac  75 mg Oral BID Fransisco Constant, PA-C      diphenhydrAMINE  50 mg Intravenous Q4H PRN Fransisco Constant, PA-C      [START ON 3/10/2021] ergocalciferol  50,000 Units Oral Weekly Fransisco Constant, PA-C      heparin (porcine)  5,000 Units Subcutaneous [facility] Inocente Sacks, CRNP      HYDROmorphone  0 5 mg Intravenous Q3H PRN Inocente Sacks, CRNP      hydrOXYzine HCL  25 mg Oral Q6H PRN Fransisco Constant, PA-C      [START ON 3/5/2021] levothyroxine  50 mcg Oral Early Morning Fransisco Constant, PA-C      liothyronine  5 mcg Oral Daily Fransisco Constant, PA-C      LORazepam  0 5 mg Intravenous Q4H PRN Inocente Sacks, CRNP      montelukast  10 mg Oral BID Fransisco Constant, PA-C      ondansetron  4 mg Intravenous Q6H PRN Inocente Sacks, CRNP      sodium chloride  75 mL/hr Intravenous Continuous Inocente Sacks, CRNP 75 mL/hr (03/04/21 0013)    temazepam  30 mg Oral HS PRN Fransisco Constant, PA-C      Vitamin B-2  400 mg Oral Daily Fransisco Constant, PA-C          Today, Patient Was Seen By: Fransisco Rivera PA-C    ** Please Note: Dictation voice to text software may have been used in the creation of this document   **

## 2021-03-04 NOTE — ASSESSMENT & PLAN NOTE
Patient is very anxious speech is rapid and she is agitated crying at times  Patient reports can only take her own home prescription pills-currently will provide Ativan 0 5 p r n   As needed for anxiety

## 2021-03-04 NOTE — ASSESSMENT & PLAN NOTE
Potassium level 3 2 on admission  Resolved since receiving IV Potassium 40 mEq  Trend potassium q d    Replete as needed

## 2021-03-04 NOTE — PLAN OF CARE
Problem: Potential for Falls  Goal: Patient will remain free of falls  Description: INTERVENTIONS:  - Assess patient frequently for physical needs  -  Identify cognitive and physical deficits and behaviors that affect risk of falls    -  [place] fall precautions as indicated by assessment   - Educate patient/family on patient safety including physical limitations  - Instruct patient to call for assistance with activity based on assessment  - Modify environment to reduce risk of injury  - Consider OT/PT consult to assist with strengthening/mobility  Outcome: Progressing

## 2021-03-04 NOTE — ASSESSMENT & PLAN NOTE
Wt Readings from Last 3 Encounters:   03/03/21 65 9 kg (145 lb 4 5 oz)   01/20/21 67 1 kg (148 lb)   12/30/20 68 kg (150 lb)

## 2021-03-04 NOTE — PHYSICAL THERAPY NOTE
Physical Therapy Evaluation    Patient Name: Shabbir Winn    IEETH'B Date: 3/4/2021     Problem List  Principal Problem:    Abdominal pain  Active Problems:    Crohn's colitis ([facility] [address] )    Hypokalemia    Generalized anxiety disorder    Hypothyroidism (acquired)    Leukocytosis    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 )         Past Surgical History  Past Surgical History:   Procedure Laterality Date    APPENDECTOMY      CHOLECYSTECTOMY      EGD AND COLONOSCOPY N/A 9/12/2017    Procedure: EGD AND COLONOSCOPY;  Surgeon: Collette Mathew MD;  [facility]; Service: Gastroenterology    ERCP N/A 9/15/2017    Procedure: ENDOSCOPIC RETROGRADE CHOLANGIOPANCREATOGRAPHY (ERCP); Surgeon: Uche Gamble MD;  [facility];   Service: Gastroenterology    HYSTERECTOMY      KNEE SURGERY Right     LAPAROSCOPIC ENDOMETRIOSIS FULGURATION      ORIF ANKLE FRACTURE Left     [place] KNEE SCOPE,MED/LAT MENISECTOMY Left 5/12/2017    Procedure: POSSIBLE MEDIAL MENISECTOMY;  Surgeon: German Zamorano MD;  [facility];  Service: Orthopedics    [place] KNEE [address] CART Left 5/12/2017    Procedure: KNEE ARTHROSCOPY EVALUATION, CHONDROPLASTY;  Surgeon: German Zamorano MD;  [facility];  Service: Orthopedics    [place] LAP,DIAGNOSTIC ABDOMEN N/A 12/12/2017    Procedure: LAPAROSCOPY DIAGNOSTIC  WITH LYSIS OF ADHESIONS;  Surgeon: Sudeep Waddell DO D[place];  [facility];  Service: General           03/04/21 0903   PT Last Visit   PT Visit Date 03/04/21   Note Type   Note type Evaluation   Pain Assessment   Pain Assessment Tool 0-10   Pain Score 8   Pain Location/Orientation Location: Abdomen   Home Living   Type of [address] [place] [address] One level;Stairs to enter with rails  (3 vs 7 VIMAL c HR)   Bathroom Shower/Tub Tub/shower unit   Bathroom Toilet Standard   Additional Comments pt denies use of DME    Prior Function   Level of [place] Independent with ADLs and functional mobility; Needs assistance with IADLs   Lives With Spouse   Receives Help From Family   ADL Assistance Independent   IADLs Needs assistance   Falls in the last 6 months   (pt states she falls up the stairs; no frequency given)   Comments + driving   Restrictions/Precautions   Weight Bearing Precautions Per Order No   Other Precautions Multiple lines   General   Family/Caregiver Present No   Cognition   Overall Cognitive Status WFL   Orientation Level Oriented X4   Following Commands Follows all commands and directions without difficulty   RLE Assessment   RLE Assessment WFL   LLE Assessment   LLE Assessment X  (WFL grossly; functional foot drop noted)   Bed Mobility   Supine to Sit 7  Independent   Sit to Supine 7  Independent   Transfers   Sit to Stand 7  Independent   Stand to Sit 7  Independent   Additional Comments no device used   Ambulation/Elevation   Gait pattern L Foot drag   Gait Assistance 7  Independent   Assistive Device None   Distance 20'   Stair Management Assistance Not tested   Balance   Static Sitting Good   Dynamic Sitting Good   Static Standing Good   Dynamic Standing Good   Ambulatory Fair +   Endurance Deficit   Endurance Deficit No   Activity Tolerance   Activity Tolerance Patient limited by pain   Assessment   Prognosis Good   Problem List Pain   Assessment Patient is a 48 y o  female evaluated by Physical Therapy s/p admit [facility] on 3/3/2021 with admitting diagnosis of: Colitis, Abdominal pain, and principal problem of: Abdominal pain  PT was consulted to assess patient's functional mobility and discharge needs  Ordered are PT Evaluation and treatment with activity level of: up with assistance  Comorbidities affecting patient's physical performance at time of assessment include: hx of DVT, asthma, CKD, RA, ulcerative colitis, hx of MI  Upon evaluation, pt able to perform all functional mobility independently without assistive device  Pt demonstrating WFL balance, strength, and coordination  L foot drop noted, however pt states that is baseline and does not effect her mobility  HR and SpO2 remained WFL on RA throughout; no LOB experienced  The patient's AM-PAC Basic Mobility Inpatient Short Form Raw Score is 23, Standardized Score is 50  88  A standardized score greater than 42 9 suggests the patient may benefit from discharge to home  Please also refer to the recommendation of the Physical Therapist for safe discharge planning  Continued PT intervention not indicated during length of stay; will d/c PT orders  Plan   PT Frequency One time visit   Recommendation   PT Discharge Recommendation Return to previous environment with no needs   PT - OK to Discharge Yes   AM-PAC Basic Mobility Inpatient   Turning in Bed Without Bedrails 4   Lying on Back to Sitting on Edge of Flat Bed 4   Moving Bed to Chair 4   Standing Up From Chair 4   Walk in Room 4   Climb 3-5 Stairs 3   Basic Mobility Inpatient Raw Score 23   Basic Mobility Standardized Score 50 88     Pt seated in bed at end of session with all needs in reach

## 2021-03-04 NOTE — ASSESSMENT & PLAN NOTE
Patient is seen/managed by physician in [place]   -Dr Charles Duran - [phone]  continue with IV Benadryl as needed

## 2021-03-04 NOTE — ASSESSMENT & PLAN NOTE
Patient is seen/managed by physician in [place]   -Dr Gabby Grider - [phone]  continue with IV Benadryl as needed

## 2021-03-04 NOTE — OCCUPATIONAL THERAPY NOTE
Occupational Therapy Evaluation     Patient Name: Tomás Castañeda  OYARIMaria TH Date: 3/4/2021  Problem List  Principal Problem:    Abdominal pain  Active Problems:    Crohn's colitis ([facility] [address] )    Hypokalemia    Generalized anxiety disorder    Hypothyroidism (acquired)    Leukocytosis    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    Th[facility] ([facility] )    Past Medical History  Past Medical History:   Diagnosis Date    Anxiety     Asthma     Chronic kidney disease     [facility]     Disease of thyroid gland     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    Generalized anxiety disorder 8/26/2017    Heart murmur     Migraine     Myocardial infarction [facility])     NSTEMI  pt denies, documented in cardio note    Pancreatitis     sphinger of     Psychiatric disorder     RA [facility]umatoid [facility]     Ulcerative colitis ([facility] )      Past Surgical History  Past Surgical History:   Procedure Laterality Date    APPENDECTOMY      CHOLECYSTECTOMY      EGD AND COLONOSCOPY N/A 9/12/2017    Procedure: EGD AND COLONOSCOPY;  Surgeon: Dustin Dawn MD;  [facility]; Service: Gastroenterology    ERCP N/A 9/15/2017    Procedure: ENDOSCOPIC RETROGRADE CHOLANGIOPANCREATOGRAPHY (ERCP); Surgeon: Padma Ochoa MD;  [facility];   Service: Gastroenterology    HYSTERECTOMY      KNEE SURGERY Right     LAPAROSCOPIC ENDOMETRIOSIS FULGURATION      ORIF ANKLE FRACTURE Left     [place] KNEE SCOPE,MED/LAT MENISECTOMY Left 5/12/2017    Procedure: POSSIBLE MEDIAL MENISECTOMY;  Surgeon: Beltran Lazo MD;  [facility];  Service: Orthopedics    [place] KNEE [address] CART Left 5/12/2017    Procedure: KNEE ARTHROSCOPY EVALUATION, CHONDROPLASTY;  Surgeon: Beltran Lazo MD;  [facility];  Service: Orthopedics    [place] LAP,DIAGNOSTIC ABDOMEN N/A 12/12/2017    Procedure: LAPAROSCOPY DIAGNOSTIC  WITH LYSIS OF ADHESIONS;  Surgeon: Mariusz Raza DO;  [facility];  Service: General 03/04/21 0905   OT Last Visit   OT Visit Date 03/04/21   Note Type   Note type Evaluation   Restrictions/Precautions   Weight Bearing Precautions Per Order No   Other Precautions Multiple lines;Contact/isolation; Fall Risk;Pain   Pain Assessment   Pain Assessment Tool 0-10   Pain Score 8   Pain Location/Orientation Location: Abdomen;Orientation: Upper   Home Living   Type of [place] One level;Performs ADLs on one level; Able to live on main level with bedroom/bathroom   Bathroom Shower/Tub Tub/shower unit   Bathroom Toilet Standard   Bathroom Accessibility Accessible   Home Equipment Other (Comment)  (no DME)   Additional Comments pt does not use device at baseline   Prior Function   Level of [place] Independent with ADLs and functional mobility   Lives With Spouse   Receives Help From Family   ADL Assistance Independent   IADLs Needs assistance   Falls in the last 6 months 1 to 4   Comments (I) eith driving   Psychosocial   Psychosocial (WDL) X   Patient Behaviors/Mood Anxious;Irritable   Subjective   Subjective "I am fine, I only fall up the stairs"   ADL   Where Assessed Edge of bed   LB Dressing Assistance 7  Independent   Additional Comments no difficulties with ADL tasks   Bed Mobility   Supine to Sit 7  Independent   Sit to Supine 7  Independent   Additional Comments pt on RA with no complaints of SOB; SPO2 and HR WFL   Transfers   Sit to Stand 7  Independent   Stand to Sit 7  Independent   Additional Comments no device; no LOB or instability; pt reports "foot drop" from previous "accident"   Functional Mobility   Functional Mobility 7  Independent   Additional Comments pt performed mobility in room; no LOB or instability; no difficulties    Balance   Static Sitting Good   Dynamic Sitting Good   Static Standing Good   Dynamic Standing Good   Ambulatory Good   Activity Tolerance   Activity Tolerance Patient tolerated treatment well   RUE Assessment   RUE Assessment WFL   SONJAE Assessment   LUE Assessment WFL   Hand Function   Gross Motor Coordination Functional   Fine Motor Coordination Functional   Sensation   Light Touch No apparent deficits   Sharp/Dull No apparent deficits   Cognition   Overall Cognitive Status WFL   Arousal/Participation Alert   Attention Within functional limits   Orientation Level Oriented X4   Memory Within functional limits   Following Commands Follows all commands and directions without difficulty   Assessment   Assessment Patient is a 48 y o  female admitted to [facility] on 3/3/2021 due to Abdominal pain  Pt performed at (I) level with no OT needs identified at this time  Comorbidities affecting pt's physical performance at time of assessment include DVT, heart murmur, asthma, RA, MI, ulcerative colitis, INDERJIT, CKD  The patient's raw score on the AM-PAC Daily Activity inpatient short form is 24, standardized score is 57 54, greater than 39 4  Patients at this level are likely to benefit from DC to home  From OT standpoint recommend anticipate no needs upon D/C  Will continue to follow pt 3-5x/week to address the goals listed below evaluation  No further acute OT needs identified at this time  Recommend continued mobilization with hospital staff and restorative services while in the hospital to increase pts endurance and strength upon D/C  From OT standpoint, recommend D/C to home with family support when medically cleared  D/C pt from OT caseload at this time     Goals   Patient Goals to get pain meds   Recommendation   OT Discharge Recommendation Return to previous environment with no needs   AM-PAC Daily Activity Inpatient   Lower Body Dressing 4   Bathing 4   Toileting 4   Upper Body Dressing 4   Grooming 4   Eating 4   Daily Activity Raw Score 24   Daily Activity Standardized Score (Calc for Raw Score >=11) 57 54   AM-PAC Applied Cognition Inpatient   Following a Speech/Presentation 4   Understanding Ordinary Conversation 4   Taking Medications 4 Remembering Where Things Are Placed or Put Away 4   Remembering List of 4-5 Errands 4   Taking Care of Complicated Tasks 4   Applied Cognition Raw Score 24   Applied Cognition Standardized Score 62 21     Pt is performing at [facility]; OT services will be discontinued at this time  Pt left supine in bed at end of session; all needs within reach; all lines intact

## 2021-03-04 NOTE — ASSESSMENT & PLAN NOTE
As evidence by white blood cell count of 10 61 on admission, trending up at 13 40  Patient with known mast cell activation and IBD utilizing home steroid  Trend CBC daily

## 2021-03-04 NOTE — ASSESSMENT & PLAN NOTE
Patient admits to right upper quadrant pain accompanied with nausea vomiting and 15 diarrheal episodes since yesterrday  P r n  Zofran  Stool studies ordered    CT abdomen/pelvis: Bowel wall thickening of the descending colon extending to the rectum suggestive of colitis of an infectious/inflammatory etiology  2 6 cm fluid attenuation structure in the right adnexal region may represent an ovarian cyst in the absence of a history of oophorectomy  Check transvaginal US

## 2021-03-04 NOTE — ASSESSMENT & PLAN NOTE
As evidence by potassium level 3 2  Potassium repleted with 40 mEq IV piggyback  Trend potassium q d    Replete as needed

## 2021-03-04 NOTE — ASSESSMENT & PLAN NOTE
Patient admits to 15 diarrheal episodes over the past 24 hours  History of IBD  Enteric panel, C diff, and other stool studies per GI ordered- specimen not collected given diarrhea has currently resolved  Holding off antibiotics and steroids for now pending stool samples  GI consultation appreciated

## 2021-03-04 NOTE — UTILIZATION REVIEW
Initial Clinical Review  PLEASE NOTE: ULTRASOUND IS STILL PENDING AND WILL NOT BE DONE BY THIS AFTERNOON  WILL BE SENT WHEN AVAILABLE  THANK YOU  Admission: Date/Time/Statement:   Admission Orders (From admission, onward)     Ordered        03/03/21 1805  Inpatient Admission  Once                   Orders Placed This Encounter   Procedures    Inpatient Admission     Standing Status:   Standing     Number of Occurrences:   1     Order Specific Question:   Level of Care     Answer:   Med Surg [16]     Order Specific Question:   Estimated length of stay     Answer:   More than 2 Midnights     Order Specific Question:   Certification     Answer:   I certify that inpatient services are medically necessary for this patient for a duration of greater than two midnights  See H&P and MD Progress Notes for additional information about the patient's course of treatment  ED Arrival Information     Expected Arrival Acuity Means of Arrival Escorted By Service Admission Type    - 3/3/2021 13:22 Urgent Walk-In Self General Medicine Urgent    Arrival Complaint    abdominal pain, diarrhea        Chief Complaint   Patient presents with    Abdominal Pain     patient reports RUQ pain for at least a week  patient reports n/v/d  extensive GI problems     Assessment/Plan: 47 yo fem w/hx chf, anxiety, rheum arthritis, asthma, ckd,  mast cell activation syndrome, crohn's to ED from home admitted as inpatient due to abdominal pain, likely from IBD, and hypokalemia  Presented with 2 days of R upper/epigastric abd pain along with profuse vomiting or diarrhea after any intake, for which pt has been under GI care, tried to get directly admitted but was unable to  Exam reveals anxious, agitated, crying, tachycardic, decreased breath sounds, RUQ abd tenderness, neck rash  CT shows colitis and possible ovarian cyst  GI consulted, IVF, IV antiemetics, analgesics in progress   Replacing k      3/3 per GI: Dx IBD, abd pain, diarrhea: complicated GI history; had several weeks of ongoing RUQ abdominal pain w/inability to eat  Has extreme diarrhea>15 times daily, which improved, but then recurred over the prior few days  Prior GI scopes showed multiple ulcers, edema, and erythema with erosions  Mild gastritis on biopsy with rectal chronic colitis  This could be a crohn's flare, check stool studies, inflammatory markers  provide IVF, antiemetics, antidiarrheals, analgesics  She will likely need IV steroids  3/4 @1247: persistent RUQ/RLQ/periumbilical abd tenderness  Appears anxious  Diarrhea now resolved, thus stool specimen could not be collected  Holding antbx and steroids until stool can be sent  k improved  Platelets are coming down, wbc is increasing  Heparin in progress for dvt prophy     transvaginal US ordered to r/o ovarian cyst      ED Triage Vitals [03/03/21 1333]   Temperature Pulse Respirations Blood Pressure SpO2   99 2 °F (37 3 °C) (!) 110 14 119/78 100 %      Temp Source Heart Rate Source Patient Position - Orthostatic VS BP Location FiO2 (%)   Temporal Monitor Lying Left arm --      Pain Score       6          Wt Readings from Last 1 Encounters:   03/03/21 65 9 kg (145 lb 4 5 oz)     Additional Vital Signs:   Date/Time  Temp  Pulse  Resp  BP  MAP (mmHg)  SpO2  O2 Device    03/04/21 07:29:26  --  75  18  108/86  93  --  --    03/03/21 23:11:41  98 7 °F (37 1 °C)  105  24Abnormal   142/98  113  98 %  --    03/03/21 1909  --  --  --  --  --  --  None (Room air)    03/03/21 18:41:09  99 4 °F (37 4 °C)  94  18  141/97  112  95 %  --    03/03/21 1800  --  100  19  124/85  99  96 %  None (Room air)    03/03/21 1745  --  92  21  105/61  76  96 %  None (Room air)    03/03/21 1730  --  89  18  101/64  78  95 %  None (Room air)    03/03/21 1700  --  92   16   105/68   82   96 %   None (Room air)       03/03/21 1645  --  93  16  116/70  88  98 %  None (Room air)    03/03/21 1630  --  90  16  98/64  75  98 %  None (Room air)    03/03/21 1615  --  85  16 123/61  84  97 %  None (Room air)    03/03/21 1530  --  91  17  98/66  78  98 %  None (Room air)    03/03/21 1515  --  101  18  93/55  69  99 %  None (Room air)    03/03/21 1500  --  101  18  99/62  76  99 %  None (Room air)    03/03/21 1445  --  102  18  88/59Abnormal   69  100 %  None (Room air)    03/03/21 1430  --  100  18  94/53  71  99 %  None (Room air)    03/03/21 1415  --  100  18  90/51  60  98 %  None (Room air)    03/03/21 1400  --  105  18  110/70  76  99 %  None (Room air)    03/03/21 1346  --  --  --  --  --  --  None (Room air)    03/03/21 1345  --  101  19  102/67  80  98 %  None (Room air)          Pertinent Labs/Diagnostic Test Results:     3/3 CT a&p: Bowel wall thickening of the descending colon extending to the rectum suggestive of colitis of an infectious/inflammatory etiology  2 6 cm fluid attenuation structure in the right adnexal region may represent an ovarian cyst in the absence of a history of oophorectomy  Transvaginal US to r/o ovarian cyst: unclear when this will be completed   Not yet done as of 3/4 @1444    Results from last 7 days   Lab Units 03/04/21  0423 03/03/21  1415   WBC Thousand/uL 13 40* 10 61*   HEMOGLOBIN g/dL 12 5 13 7   HEMATOCRIT % 39 4 43 3   PLATELETS Thousands/uL 488* 519*   NEUTROS ABS Thousands/µL  --  6 19         Results from last 7 days   Lab Units 03/04/21  0424 03/04/21  0423 03/03/21  1415   SODIUM mmol/L  --  138 141   POTASSIUM mmol/L  --  4 3 3 2*   CHLORIDE mmol/L  --  104 106   CO2 mmol/L  --  24 29   ANION GAP mmol/L  --  10 6   BUN mg/dL  --  7 10   CREATININE mg/dL  --  0 73 0 84   EGFR ml/min/1 73sq m  --  98 82   CALCIUM mg/dL  --  8 9 8 6   MAGNESIUM mg/dL 1 9  --  1 8   PHOSPHORUS mg/dL 2 7  --   --      Results from last 7 days   Lab Units 03/04/21  0423 03/03/21  1415   AST U/L 21 26   ALT U/L 45 51   ALK PHOS U/L 51 51   TOTAL PROTEIN g/dL 7 1 7 0   ALBUMIN g/dL 3 2* 3 1*   TOTAL BILIRUBIN mg/dL 0 30 0 30         Results from last 7 days Lab Units 03/04/21  0423 03/03/21  1415   GLUCOSE RANDOM mg/dL 117 130     Results from last 7 days   Lab Units 03/03/21  1415   TROPONIN I ng/mL <0 02         Results from last 7 days   Lab Units 03/03/21  1415   PROTIME seconds 12 4   INR  0 94   PTT seconds 25             Results from last 7 days   Lab Units 03/03/21  1415   LACTIC ACID mmol/L 1 9       Results from last 7 days   Lab Units 03/03/21  1415   LIPASE u/L 81     Results from last 7 days   Lab Units 03/03/21  1623   CRP mg/L <0 5         Results from last 7 days   Lab Units 03/03/21  1547   CLARITY UA  Clear   COLOR UA  Yellow   SPEC GRAV UA  1 015   PH UA  7 0   GLUCOSE UA mg/dl Negative   KETONES UA mg/dl Negative   BLOOD UA  Negative   PROTEIN UA mg/dl Negative   NITRITE UA  Negative   BILIRUBIN UA  Negative   UROBILINOGEN UA E U /dl 0 2   LEUKOCYTES UA  Negative     ED Treatment:   Medication Administration from 03/03/2021 1322 to 03/03/2021 1833       Date/Time Order Dose Route Action     03/03/2021 1447 sodium chloride 0 9 % bolus 1,000 mL 1,000 mL Intravenous New Bag     03/03/2021 1448 fentanyl citrate (PF) 100 MCG/2ML 25 mcg 25 mcg Intravenous Given     03/03/2021 1449 potassium chloride 20 mEq IVPB (premix) 20 mEq Intravenous New Bag     03/03/2021 1456 methylPREDNISolone sodium succinate (Solu-MEDROL) injection 125 mg 125 mg Intravenous Given     03/03/2021 1458 diphenhydrAMINE (BENADRYL) injection 25 mg 25 mg Intravenous Given     03/03/2021 1643 sodium chloride 0 9 % bolus 1,000 mL 1,000 mL Intravenous New Bag     03/03/2021 1643 diphenhydrAMINE (BENADRYL) injection 25 mg 25 mg Intravenous Given     03/03/2021 1753 fentanyl citrate (PF) 100 MCG/2ML 25 mcg 25 mcg Intravenous Given        Past Medical History:   Diagnosis Date    Anxiety     Asthma     Chronic kidney disease     Deep vein thrombosis ([address] )     Disease of thyroid gland     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    Generalized anxiety disorder 8/26/2017  Heart murmur     Migraine     Myocardial infarction [facility])     NSTEMI  pt denies, documented in cardio note    Pancreatitis     sphinger of     Psychiatric disorder     RA [facility]umatoid arthritis) [facility] [address]     Ulcerative colitis ([facility] )      Present on Admission:   Abdominal pain   Diarrhea   Hypothyroidism (acquired)   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HCC)   Generalized anxiety disorder      Admitting Diagnosis: Colitis [K52 9]  Abdominal pain [R10 9]  Age/Sex: 48 y o  female  Admission Orders:  Scheduled Medications:  busPIRone, 5 mg, Oral, Q24H  cyanocobalamin, 1,000 mcg, Intramuscular, Once  heparin (porcine), 5,000 Units, Subcutaneous, Q8H [facility]  IV pepcid x 1 3/3  IV kcl x 2 3/4    Continuous IV Infusions:  sodium chloride, 75 mL/hr, Intravenous, Continuous      PRN Meds:  diphenhydrAMINE, 50 mg, Intravenous, Q4H PRN  (25mg x 1 3/4 @0655)  50mg x 2 3/4 @0249, 1126  HYDROmorphone, 0 5 mg, Intravenous, Q3H PRN x 1 3/3 @2315, x 4 3/4 @0214, 0524, 0912, 1304  LORazepam, 0 5 mg, Intravenous, Q4H PRN x 3 3/4 @0142, 0747, 1303  ondansetron, 4 mg, Intravenous, Q6H PRN    SCD's  Stool for c  Diff, o&p, fecal calprotectin, enteric panel  Surgical lite diet    IP CONSULT TO GASTROENTEROLOGY  IP CONSULT TO GASTROENTEROLOGY  IP CONSULT TO CASE MANAGEMENT    Network Utilization Review Department  ATTENTION: Please call with any questions or concerns to [phone] and carefully listen to the prompts so that you are directed to the right person  All voicemails are confidential   Herlinda Brown all requests for admission clinical reviews, approved or denied determinations and any other requests to dedicated fax number below belonging to the campus where the patient is receiving treatment   List of dedicated fax numbers for the Facilities:  [address] DENIALS (Administrative/Medical Necessity) [phone]   [address] (Maternity/NICU/Pediatrics) [phone]   [address] [facility] [address] [facility]  [phone]   [address] [address] [place] [address] [facility] [phone]   [place] [address] [facility] [phone]

## 2021-03-04 NOTE — ASSESSMENT & PLAN NOTE
Platelet count of 411 on admission     Platelet count of 674 today  Trend CBC daily  Heparin 5000 units q 8 hours for DVT PPX

## 2021-03-05 LAB
ANION GAP SERPL CALCULATED.3IONS-SCNC: 9 MMOL/L (ref 4–13)
BASOPHILS # BLD AUTO: 0.18 THOUSANDS/ΜL (ref 0–0.1)
BASOPHILS NFR BLD AUTO: 2 % (ref 0–1)
BUN SERPL-MCNC: 9 MG/DL (ref 5–25)
CALCIUM SERPL-MCNC: 8.1 MG/DL (ref 8.3–10.1)
CHLORIDE SERPL-SCNC: 106 MMOL/L (ref 100–108)
CO2 SERPL-SCNC: 26 MMOL/L (ref 21–32)
CREAT SERPL-MCNC: 0.74 MG/DL (ref 0.6–1.3)
EOSINOPHIL # BLD AUTO: 0.46 THOUSAND/ΜL (ref 0–0.61)
EOSINOPHIL NFR BLD AUTO: 5 % (ref 0–6)
ERYTHROCYTE [DISTWIDTH] IN BLOOD BY AUTOMATED COUNT: 13.9 % (ref 11.6–15.1)
GFR SERPL CREATININE-BSD FRML MDRD: 96 ML/MIN/1.73SQ M
GLUCOSE SERPL-MCNC: 90 MG/DL (ref 65–140)
HCT VFR BLD AUTO: 39.1 % (ref 34.8–46.1)
HGB BLD-MCNC: 12.3 G/DL (ref 11.5–15.4)
IMM GRANULOCYTES # BLD AUTO: 0.03 THOUSAND/UL (ref 0–0.2)
IMM GRANULOCYTES NFR BLD AUTO: 0 % (ref 0–2)
LYMPHOCYTES # BLD AUTO: 3.17 THOUSANDS/ΜL (ref 0.6–4.47)
LYMPHOCYTES NFR BLD AUTO: 32 % (ref 14–44)
MAGNESIUM SERPL-MCNC: 1.8 MG/DL (ref 1.6–2.6)
MCH RBC QN AUTO: 30.4 PG (ref 26.8–34.3)
MCHC RBC AUTO-ENTMCNC: 31.5 G/DL (ref 31.4–37.4)
MCV RBC AUTO: 97 FL (ref 82–98)
MONOCYTES # BLD AUTO: 0.92 THOUSAND/ΜL (ref 0.17–1.22)
MONOCYTES NFR BLD AUTO: 9 % (ref 4–12)
NEUTROPHILS # BLD AUTO: 5.28 THOUSANDS/ΜL (ref 1.85–7.62)
NEUTS SEG NFR BLD AUTO: 52 % (ref 43–75)
NRBC BLD AUTO-RTO: 0 /100 WBCS
PHOSPHATE SERPL-MCNC: 2.6 MG/DL (ref 2.7–4.5)
PLATELET # BLD AUTO: 436 THOUSANDS/UL (ref 149–390)
PMV BLD AUTO: 8.8 FL (ref 8.9–12.7)
POTASSIUM SERPL-SCNC: 3.8 MMOL/L (ref 3.5–5.3)
RBC # BLD AUTO: 4.04 MILLION/UL (ref 3.81–5.12)
SODIUM SERPL-SCNC: 141 MMOL/L (ref 136–145)
WBC # BLD AUTO: 10.04 THOUSAND/UL (ref 4.31–10.16)

## 2021-03-05 PROCEDURE — 99232 SBSQ HOSP IP/OBS MODERATE 35: CPT | Performed by: INTERNAL MEDICINE

## 2021-03-05 PROCEDURE — 99232 SBSQ HOSP IP/OBS MODERATE 35: CPT | Performed by: PHYSICIAN ASSISTANT

## 2021-03-05 PROCEDURE — 84100 ASSAY OF PHOSPHORUS: CPT | Performed by: PHYSICIAN ASSISTANT

## 2021-03-05 PROCEDURE — 85025 COMPLETE CBC W/AUTO DIFF WBC: CPT | Performed by: PHYSICIAN ASSISTANT

## 2021-03-05 PROCEDURE — 80048 BASIC METABOLIC PNL TOTAL CA: CPT | Performed by: PHYSICIAN ASSISTANT

## 2021-03-05 PROCEDURE — 83735 ASSAY OF MAGNESIUM: CPT | Performed by: PHYSICIAN ASSISTANT

## 2021-03-05 RX ORDER — POLYETHYLENE GLYCOL 3350 17 G/17G
17 POWDER, FOR SOLUTION ORAL DAILY
Status: DISCONTINUED | OUTPATIENT
Start: 2021-03-05 | End: 2021-03-08

## 2021-03-05 RX ORDER — MAGNESIUM SULFATE HEPTAHYDRATE 40 MG/ML
2 INJECTION, SOLUTION INTRAVENOUS ONCE
Status: COMPLETED | OUTPATIENT
Start: 2021-03-05 | End: 2021-03-05

## 2021-03-05 RX ADMIN — CETIRIZINE HYDROCHLORIDE 10 MG: 10 TABLET, FILM COATED ORAL at 08:38

## 2021-03-05 RX ADMIN — HYDROMORPHONE HYDROCHLORIDE 0.5 MG: 1 INJECTION, SOLUTION INTRAMUSCULAR; INTRAVENOUS; SUBCUTANEOUS at 19:26

## 2021-03-05 RX ADMIN — HYDROMORPHONE HYDROCHLORIDE 0.5 MG: 1 INJECTION, SOLUTION INTRAMUSCULAR; INTRAVENOUS; SUBCUTANEOUS at 08:53

## 2021-03-05 RX ADMIN — DIPHENHYDRAMINE HYDROCHLORIDE 50 MG: 50 INJECTION, SOLUTION INTRAMUSCULAR; INTRAVENOUS at 13:34

## 2021-03-05 RX ADMIN — LORAZEPAM 0.5 MG: 2 INJECTION INTRAMUSCULAR; INTRAVENOUS at 17:11

## 2021-03-05 RX ADMIN — POTASSIUM PHOSPHATE, MONOBASIC AND POTASSIUM PHOSPHATE, DIBASIC 12 MMOL: 224; 236 INJECTION, SOLUTION, CONCENTRATE INTRAVENOUS at 12:04

## 2021-03-05 RX ADMIN — ONDANSETRON 4 MG: 2 INJECTION INTRAMUSCULAR; INTRAVENOUS at 22:25

## 2021-03-05 RX ADMIN — SODIUM CHLORIDE 75 ML/HR: 0.9 INJECTION, SOLUTION INTRAVENOUS at 01:55

## 2021-03-05 RX ADMIN — HYDROXYZINE HYDROCHLORIDE 25 MG: 25 TABLET, FILM COATED ORAL at 20:48

## 2021-03-05 RX ADMIN — HYDROMORPHONE HYDROCHLORIDE 0.5 MG: 1 INJECTION, SOLUTION INTRAMUSCULAR; INTRAVENOUS; SUBCUTANEOUS at 13:05

## 2021-03-05 RX ADMIN — DIPHENHYDRAMINE HYDROCHLORIDE 50 MG: 50 INJECTION, SOLUTION INTRAMUSCULAR; INTRAVENOUS at 09:16

## 2021-03-05 RX ADMIN — HYDROMORPHONE HYDROCHLORIDE 0.5 MG: 1 INJECTION, SOLUTION INTRAMUSCULAR; INTRAVENOUS; SUBCUTANEOUS at 01:58

## 2021-03-05 RX ADMIN — LORAZEPAM 0.5 MG: 2 INJECTION INTRAMUSCULAR; INTRAVENOUS at 21:58

## 2021-03-05 RX ADMIN — HYDROXYZINE HYDROCHLORIDE 25 MG: 25 TABLET, FILM COATED ORAL at 03:20

## 2021-03-05 RX ADMIN — HYDROMORPHONE HYDROCHLORIDE 0.5 MG: 1 INJECTION, SOLUTION INTRAMUSCULAR; INTRAVENOUS; SUBCUTANEOUS at 16:11

## 2021-03-05 RX ADMIN — MONTELUKAST 10 MG: 10 TABLET, FILM COATED ORAL at 08:38

## 2021-03-05 RX ADMIN — DIPHENHYDRAMINE HYDROCHLORIDE 50 MG: 50 INJECTION, SOLUTION INTRAMUSCULAR; INTRAVENOUS at 17:48

## 2021-03-05 RX ADMIN — POLYETHYLENE GLYCOL 3350 17 G: 17 POWDER, FOR SOLUTION ORAL at 19:26

## 2021-03-05 RX ADMIN — LORAZEPAM 0.5 MG: 2 INJECTION INTRAMUSCULAR; INTRAVENOUS at 13:05

## 2021-03-05 RX ADMIN — HYDROMORPHONE HYDROCHLORIDE 0.5 MG: 1 INJECTION, SOLUTION INTRAMUSCULAR; INTRAVENOUS; SUBCUTANEOUS at 22:25

## 2021-03-05 RX ADMIN — Medication 1200 UNITS: at 08:39

## 2021-03-05 RX ADMIN — HYDROMORPHONE HYDROCHLORIDE 0.5 MG: 1 INJECTION, SOLUTION INTRAMUSCULAR; INTRAVENOUS; SUBCUTANEOUS at 05:15

## 2021-03-05 RX ADMIN — LORAZEPAM 0.5 MG: 2 INJECTION INTRAMUSCULAR; INTRAVENOUS at 03:15

## 2021-03-05 RX ADMIN — MAGNESIUM SULFATE IN WATER 2 G: 40 INJECTION, SOLUTION INTRAVENOUS at 09:01

## 2021-03-05 RX ADMIN — MONTELUKAST 10 MG: 10 TABLET, FILM COATED ORAL at 20:49

## 2021-03-05 RX ADMIN — DIPHENHYDRAMINE HYDROCHLORIDE 50 MG: 50 INJECTION, SOLUTION INTRAMUSCULAR; INTRAVENOUS at 21:58

## 2021-03-05 RX ADMIN — DIPHENHYDRAMINE HYDROCHLORIDE 50 MG: 50 INJECTION, SOLUTION INTRAMUSCULAR; INTRAVENOUS at 00:00

## 2021-03-05 RX ADMIN — DIPHENHYDRAMINE HYDROCHLORIDE 50 MG: 50 INJECTION, SOLUTION INTRAMUSCULAR; INTRAVENOUS at 05:15

## 2021-03-05 RX ADMIN — SODIUM CHLORIDE 75 ML/HR: 0.9 INJECTION, SOLUTION INTRAVENOUS at 17:10

## 2021-03-05 RX ADMIN — LORAZEPAM 0.5 MG: 2 INJECTION INTRAMUSCULAR; INTRAVENOUS at 08:54

## 2021-03-05 NOTE — ASSESSMENT & PLAN NOTE
Patient is seen/managed by physician in [place]   -Dr Carrasquillo Hung - [phone]  continue with IV Benadryl as needed

## 2021-03-05 NOTE — ASSESSMENT & PLAN NOTE
As evidence by white blood cell count of 10 61 on admission, peaked at 13 40- now resolved     Patient with known mast cell activation and IBD utilizing home steroid  Trend CBC daily

## 2021-03-05 NOTE — ASSESSMENT & PLAN NOTE
Patient admits to right upper quadrant pain accompanied with nausea vomiting and 15 diarrheal episodes on the day prior to admission  P r n  Zofran  Stool studies ordered    CT abdomen/pelvis: Bowel wall thickening of the descending colon extending to the rectum suggestive of colitis of an infectious/inflammatory etiology  2 6 cm fluid attenuation structure in the right adnexal region may represent an ovarian cyst in the absence of a history of oophorectomy  Transvaginal US: Limited visualization of a complex-appearing cystic structure in the right ovary measuring 2 6 cm  Recommend follow-up pelvic ultrasound in 6-12 weeks

## 2021-03-05 NOTE — PROGRESS NOTES
Progress Note - Harsha Rodas 1975, 48 y o  female MRN: [id number]    Unit/Bed#: 407-01 Encounter: [id number]    Primary Care Provider: Luna Barrios MD   Date and time admitted to hospital: 3/3/2021  1:26 PM        Diarrhea  Assessment & Plan  Patient admits to 15 diarrheal episodes over the past 24 hours prior to admission  History of IBD  Enteric panel, C diff, and other stool studies per GI ordered- specimen not collected given diarrhea has currently resolved  Holding off antibiotics and steroids for now pending stool specimen needing to be collected  GI consultation appreciated  Mast cell activation syndrome [facility])  Assessment & Plan  Patient is seen/managed by physician in [place]   -Dr Elizabeth De La Fuente - [phone]  continue with IV Benadryl as needed         * Abdominal pain  Assessment & Plan  Patient admits to right upper quadrant pain accompanied with nausea vomiting and 15 diarrheal episodes on the day prior to admission  P r n  Zofran  Stool studies ordered    CT abdomen/pelvis: Bowel wall thickening of the descending colon extending to the rectum suggestive of colitis of an infectious/inflammatory etiology  2 6 cm fluid attenuation structure in the right adnexal region may represent an ovarian cyst in the absence of a history of oophorectomy  Transvaginal US: Limited visualization of a complex-appearing cystic structure in the right ovary measuring 2 6 cm  Recommend follow-up pelvic ultrasound in 6-12 weeks  Leukocytosis  Assessment & Plan  As evidence by white blood cell count of 10 61 on admission, peaked at 13 40- now resolved  Patient with known mast cell activation and IBD utilizing home steroid  Trend CBC daily        Thrombocytosis (HCC)  Assessment & Plan  Platelet count of 518 on admission     Platelet count of 247 today  Trend CBC daily    Hypothyroidism (acquired)  Assessment & Plan  Patient reportedly not taking levothyroxine for last several weeks  Patient currently refusing this  Generalized anxiety disorder  Assessment & Plan  Patient is less anxious today  Patient reports can only take her own home prescription pills-currently will provide Ativan 0 5 p r n  As needed for anxiety  Restarted home regimen with patient's home prescriptions    Hypokalemia  Assessment & Plan  Potassium level 3 2 on admission  Resolved since receiving IV Potassium 40 mEq  Trend potassium q d  Replete as needed      VTE Pharmacologic Prophylaxis:   Pharmacologic: Patient has refused VTE prophylaxis  Mechanical VTE Prophylaxis in Place: Yes    Patient Centered Rounds: I have performed bedside rounds with nursing staff today  Discussions with Specialists or Other Care Team Provider: nursing, CM, GI    Education and Discussions with Family / Patient: patient    Time Spent for Care: 20 minutes  More than 50% of total time spent on counseling and coordination of care as described above  Current Length of Stay: 2 day(s)    Current Patient Status: Inpatient   Certification Statement: The patient will continue to require additional inpatient hospital stay due to continued need for IV fluids, prn pain meds, monitoring labs    Discharge Plan: TBD    Code Status: Level 1 - Full Code      Subjective: The patient was seen and examined  The patient states she is tolerating her current diet but continues to have pain/bloating after  She denies any episodes of diarrhea  Objective:     Vitals:   Temp (24hrs), Av 2 °F (37 3 °C), Min:98 8 °F (37 1 °C), Max:99 5 °F (37 5 °C)    Temp:  [98 8 °F (37 1 °C)-99 5 °F (37 5 °C)] 98 8 °F (37 1 °C)  HR:  [] 82  Resp:  [17-18] 17  BP: ()/(63-83) 93/63  SpO2:  [97 %] 97 %  Body mass index is 23 99 kg/m²  Input and Output Summary (last 24 hours):        Intake/Output Summary (Last 24 hours) at 3/5/2021 1435  Last data filed at 3/5/2021 1300  Gross per 24 hour   Intake 1900 ml   Output --   Net 1900 ml       Physical Exam:     Physical Exam  Vitals signs and nursing note reviewed  Constitutional:       General: She is awake  Cardiovascular:      Rate and Rhythm: Normal rate and regular rhythm  Pulmonary:      Effort: Pulmonary effort is normal       Breath sounds: Normal breath sounds  No wheezing, rhonchi or rales  Abdominal:      General: Bowel sounds are increased  Palpations: Abdomen is soft  Tenderness: There is abdominal tenderness (right groin) in the right upper quadrant  Comments: Mildly bloated   Skin:     General: Skin is warm and dry  Neurological:      General: No focal deficit present  Mental Status: She is alert and oriented to person, place, and time  Psychiatric:         Attention and Perception: Attention normal          Mood and Affect: Mood normal          Speech: Speech normal          Behavior: Behavior is cooperative  Additional Data:     Labs:    Results from last 7 days   Lab Units 03/05/21  0554   WBC Thousand/uL 10 04   HEMOGLOBIN g/dL 12 3   HEMATOCRIT % 39 1   PLATELETS Thousands/uL 436*   NEUTROS PCT % 52   LYMPHS PCT % 32   MONOS PCT % 9   EOS PCT % 5     Results from last 7 days   Lab Units 03/05/21  0554 03/04/21  0423   SODIUM mmol/L 141 138   POTASSIUM mmol/L 3 8 4 3   CHLORIDE mmol/L 106 104   CO2 mmol/L 26 24   BUN mg/dL 9 7   CREATININE mg/dL 0 74 0 73   ANION GAP mmol/L 9 10   CALCIUM mg/dL 8 1* 8 9   ALBUMIN g/dL  --  3 2*   TOTAL BILIRUBIN mg/dL  --  0 30   ALK PHOS U/L  --  51   ALT U/L  --  45   AST U/L  --  21   GLUCOSE RANDOM mg/dL 90 117     Results from last 7 days   Lab Units 03/03/21  1415   INR  0 94             Results from last 7 days   Lab Units 03/03/21  1415   LACTIC ACID mmol/L 1 9           * I Have Reviewed All Lab Data Listed Above  * Additional Pertinent Lab Tests Reviewed:  All Labs Within Last 24 Hours Reviewed    Imaging:    Imaging Reports Reviewed Today Include: none  Imaging Personally Reviewed by Myself Includes:  none    Recent Cultures (last 7 days):           Last 24 Hours Medication List:   Current Facility-Administered Medications   Medication Dose Route Frequency Provider Last Rate    busPIRone  5 mg Oral Q24H Aarti Y YohanankJONATHAN      calcium citrate  950 mg Oral Daily Lucho Simmons PA-C      cetirizine  10 mg Oral Daily Lucho Simmons PA-C      cholecalciferol  1,200 Units Oral Daily Lucho Simmons PA-C      cromolyn  200 mg Oral 4x Daily (AC & HS) Lucho Simmons PA-C      diclofenac  75 mg Oral BID Lucho Simmons PA-C      diphenhydrAMINE  50 mg Intravenous Q4H PRN Lucho Simmons PA-C      [START ON 3/10/2021] ergocalciferol  50,000 Units Oral Weekly Lucho Simmons PA-C      heparin (porcine)  5,000 Units Subcutaneous [facility] JNOATHAN [place]      HYDROmorphone  0 5 mg Intravenous Q3H PRN JONATHAN [place]      hydrOXYzine HCL  25 mg Oral Q6H PRN Lucho Simmons PA-C      levothyroxine  50 mcg Oral Early Morning Lucho Simmons PA-C      liothyronine  5 mcg Oral Daily Lucho Simmons PA-C      LORazepam  0 5 mg Intravenous Q4H PRN JONATHAN [place]      montelukast  10 mg Oral BID Lucho Simmons PA-C      ondansetron  4 mg Intravenous Q6H PRN Aarti Y Yohanank, JONATHAN      potassium phosphate  12 mmol Intravenous Once Lucho Simmons PA-C 12 mmol (03/05/21 1204)    sodium chloride  75 mL/hr Intravenous Continuous Aarti Y Swank, CRNP 75 mL/hr (03/05/21 0155)    temazepam  30 mg Oral HS PRN Lucho Simmons PA-C      Vitamin B-2  400 mg Oral Daily Lucho Simmons PA-C          Today, Patient Was Seen By: Lucho Simmons PA-C    ** Please Note: Dictation voice to text software may have been used in the creation of this document   **

## 2021-03-05 NOTE — ASSESSMENT & PLAN NOTE
Patient reportedly not taking levothyroxine for last several weeks  Patient currently refusing this

## 2021-03-05 NOTE — PROGRESS NOTES
Progress Note - Josephine Pump 48 y o  female MRN: [id number]    Unit/Bed#: 709-81 Encounter: [id number]         Assessment/ Plan:  IBD  Abdominal pain  Diarrhea     Patient has a complicated GI past medical history including a inflammatory bowel disease, who presents with right upper quadrant abdominal pain and diarrhea  CRP is WNL's  Given recent antibiotic use we need to rule out infectious process before starting steroids  She hasn't had any further diarrhea or BM's, so no stools collected  She is feeling bloated/ constipated  CT showed bowel wall thickening from the descending colon to rectum  -  C diff, stool culture, O&P, fecal calprotectin and CRP  - antiemetics as needed  - antidiarrheals once stool studies obtained  - pain medication as needed  -consider colace/ Miralax      Subjective:   Pt still complaining of abdominal pain  She has not had a BM  Objective:     Vitals: Blood pressure 93/63, pulse 82, temperature 98 8 °F (37 1 °C), temperature source Oral, resp  rate 17, height 5' 5 25" (1 657 m), weight 65 9 kg (145 lb 4 5 oz), SpO2 97 %, not currently breastfeeding  ,Body mass index is 23 99 kg/m²  Physical Exam: General appearance: alert and oriented, in no acute distress  Lungs: clear to auscultation bilaterally  Heart: regular rate and rhythm  Abdomen: soft, mildly TTP  Skin: Skin color, texture, turgor normal  No rashes or lesions     Invasive Devices     Peripheral Intravenous Line            Peripheral IV 03/05/21 Right Hand less than 1 day                  Lab, Imaging and other studies: I have personally reviewed pertinent reports       CBC:   Lab Results   Component Value Date    WBC 10 04 03/05/2021    HGB 12 3 03/05/2021    HCT 39 1 03/05/2021    MCV 97 03/05/2021     (H) 03/05/2021    MCH 30 4 03/05/2021    MCHC 31 5 03/05/2021    RDW 13 9 03/05/2021    MPV 8 8 (L) 03/05/2021    NRBC 0 03/05/2021   ,   CMP:   Lab Results   Component Value Date    K 3 8 03/05/2021     03/05/2021    CO2 26 03/05/2021    BUN 9 03/05/2021    CREATININE 0 74 03/05/2021    CALCIUM 8 1 (L) 03/05/2021    EGFR 96 03/05/2021   ,     Phosphorous:   Lab Results   Component Value Date    PHOS 2 6 (L) 03/05/2021

## 2021-03-05 NOTE — ASSESSMENT & PLAN NOTE
Patient is less anxious today  Patient reports can only take her own home prescription pills-currently will provide Ativan 0 5 p r n   As needed for anxiety  Restarted home regimen with patient's home prescriptions

## 2021-03-05 NOTE — PLAN OF CARE
Problem: Potential for Falls  Goal: Patient will remain free of falls  Description: INTERVENTIONS:  - Assess patient frequently for physical needs  -  Identify cognitive and physical deficits and behaviors that affect risk of falls    -  [place] fall precautions as indicated by assessment   - Educate patient/family on patient safety including physical limitations  - Instruct patient to call for assistance with activity based on assessment  - Modify environment to reduce risk of injury  - Consider OT/PT consult to assist with strengthening/mobility  Outcome: Progressing     Problem: GASTROINTESTINAL - ADULT  Goal: Minimal or absence of nausea and/or vomiting  Description: INTERVENTIONS:  - Administer IV fluids if ordered to ensure adequate hydration  - Maintain NPO status until nausea and vomiting are resolved  - Nasogastric tube if ordered  - Administer ordered antiemetic medications as needed  - Provide nonpharmacologic comfort measures as appropriate  - Advance diet as tolerated, if ordered  - Consider nutrition services referral to assist patient with adequate nutrition and appropriate food choices  Outcome: Progressing  Goal: Maintains or returns to baseline bowel function  Description: INTERVENTIONS:  - Assess bowel function  - Encourage oral fluids to ensure adequate hydration  - Administer IV fluids if ordered to ensure adequate hydration  - Administer ordered medications as needed  - Encourage mobilization and activity  - Consider nutritional services referral to assist patient with adequate nutrition and appropriate food choices  Outcome: Progressing  Goal: Maintains adequate nutritional intake  Description: INTERVENTIONS:  - Monitor percentage of each meal consumed  - Identify factors contributing to decreased intake, treat as appropriate  - Assist with meals as needed  - Monitor I&O, weight, and lab values if indicated  - Obtain nutrition services referral as needed  Outcome: Progressing     Problem: METABOLIC, FLUID AND ELECTROLYTES - ADULT  Goal: Electrolytes maintained within normal limits  Description: INTERVENTIONS:  - Monitor labs and assess patient for signs and symptoms of electrolyte imbalances  - Administer electrolyte replacement as ordered  - Monitor response to electrolyte replacements, including repeat lab results as appropriate  - Instruct patient on fluid and nutrition as appropriate  Outcome: Progressing     Problem: PAIN - ADULT  Goal: Verbalizes/displays adequate comfort level or baseline comfort level  Description: Interventions:  - Encourage patient to monitor pain and request assistance  - Assess pain using appropriate pain scale  - Administer analgesics based on type and severity of pain and evaluate response  - Implement non-pharmacological measures as appropriate and evaluate response  - Consider cultural and social influences on pain and pain management  - Notify physician/advanced practitioner if interventions unsuccessful or patient reports new pain  Outcome: Progressing     Problem: INFECTION - ADULT  Goal: Absence or prevention of progression during hospitalization  Description: INTERVENTIONS:  - Assess and monitor for signs and symptoms of infection  - Monitor lab/diagnostic results  - Monitor all insertion sites, i e  indwelling lines, tubes, and drains  - Monitor endotracheal if appropriate and nasal secretions for changes in amount and color  - [place] appropriate cooling/warming therapies per order  - Administer medications as ordered  - Instruct and encourage patient and family to use good hand hygiene technique  - Identify and instruct in appropriate isolation precautions for identified infection/condition  Outcome: Progressing     Problem: SAFETY ADULT  Goal: Maintain or return to baseline ADL function  Description: INTERVENTIONS:  -  Assess patient's ability to carry out ADLs; assess patient's baseline for ADL function and identify physical deficits which impact ability to perform ADLs (bathing, care of mouth/teeth, toileting, grooming, dressing, etc )  - Assess/evaluate cause of self-care deficits   - Assess range of motion  - Assess patient's mobility; develop plan if impaired  - Assess patient's need for assistive devices and provide as appropriate  - Encourage maximum independence but intervene and supervise when necessary  - Involve family in performance of ADLs  - Assess for home care needs following discharge   - Consider OT consult to assist with ADL evaluation and planning for discharge  - Provide patient education as appropriate  Outcome: Progressing  Goal: Maintain or return mobility status to optimal level  Description: INTERVENTIONS:  - Assess patient's baseline mobility status (ambulation, transfers, stairs, etc )    - Identify cognitive and physical deficits and behaviors that affect mobility  - Identify mobility aids required to assist with transfers and/or ambulation (gait belt, sit-to-stand, lift, walker, cane, etc )  - [place] fall precautions as indicated by assessment  - Record patient progress and toleration of activity level on Mobility SBAR; progress patient to next Phase/Stage  - Instruct patient to call for assistance with activity based on assessment  - Consider rehabilitation consult to assist with strengthening/weightbearing, etc   Outcome: Progressing     Problem: DISCHARGE PLANNING  Goal: Discharge to home or other facility with appropriate resources  Description: INTERVENTIONS:  - Identify barriers to discharge w/patient and caregiver  - Arrange for needed discharge resources and transportation as appropriate  - Identify discharge learning needs (meds, wound care, etc )  - Arrange for interpretive services to assist at discharge as needed  - Refer to Case Management Department for coordinating discharge planning if the patient needs post-hospital services based on physician/advanced practitioner order or complex needs related to functional status, cognitive ability, or social support system  Outcome: Progressing     Problem: Knowledge Deficit  Goal: Patient/family/caregiver demonstrates understanding of disease process, treatment plan, medications, and discharge instructions  Description: Complete learning assessment and assess knowledge base    Interventions:  - Provide teaching at level of understanding  - Provide teaching via preferred learning methods  Outcome: Progressing

## 2021-03-05 NOTE — UTILIZATION REVIEW
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  IV Mg & KPhos repletion underway, IVF maintained     Pertinent Labs/Diagnostic Results:   Results from last 7 days   Lab Units 03/05/21  0554 03/04/21 0423 03/03/21  1415   WBC Thousand/uL 10 04 13 40* 10 61*   HEMOGLOBIN g/dL 12 3 12 5 13 7   HEMATOCRIT % 39 1 39 4 43 3   PLATELETS Thousands/uL 436* 488* 519*   NEUTROS ABS Thousands/µL 5 28  --  6 19     Results from last 7 days   Lab Units 03/05/21  0554 03/04/21  0424 03/04/21 0423 03/03/21  1415   SODIUM mmol/L 141  --  138 141   POTASSIUM mmol/L 3 8  --  4 3 3 2*   CHLORIDE mmol/L 106  --  104 106   CO2 mmol/L 26  --  24 29   ANION GAP mmol/L 9  --  10 6   BUN mg/dL 9  --  7 10   CREATININE mg/dL 0 74  --  0 73 0 84   EGFR ml/min/1 73sq m 96  --  98 82   CALCIUM mg/dL 8 1*  --  8 9 8 6   MAGNESIUM mg/dL 1 8 1 9  --  1 8   PHOSPHORUS mg/dL 2 6* 2 7  --   --      Results from last 7 days   Lab Units 03/04/21 0423 03/03/21  1415   AST U/L 21 26   ALT U/L 45 51   ALK PHOS U/L 51 51   TOTAL PROTEIN g/dL 7 1 7 0   ALBUMIN g/dL 3 2* 3 1*   TOTAL BILIRUBIN mg/dL 0 30 0 30     Results from last 7 days   Lab Units 03/05/21  0554 03/04/21 0423 03/03/21  1415   GLUCOSE RANDOM mg/dL 90 117 130     Results from last 7 days   Lab Units 03/03/21  1415   TROPONIN I ng/mL <0 02     Results from last 7 days   Lab Units 03/03/21  1415   PROTIME seconds 12 4   INR  0 94   PTT seconds 25     Results from last 7 days   Lab Units 03/03/21  1415   LACTIC ACID mmol/L 1 9     Results from last 7 days   Lab Units 03/03/21  1415   LIPASE u/L 81     Results from last 7 days   Lab Units 03/03/21  1623   CRP mg/L <0 5     Results from last 7 days   Lab Units 03/03/21  1547   CLARITY UA  Clear   COLOR UA  Yellow   SPEC GRAV UA  1 015   PH UA  7 0   GLUCOSE UA mg/dl Negative   KETONES UA mg/dl Negative   BLOOD UA  Negative   PROTEIN UA mg/dl Negative   NITRITE UA  Negative   BILIRUBIN UA  Negative   UROBILINOGEN UA E U /dl 0 2   LEUKOCYTES UA  Negative     3/4  US pelvis complete w transvaginal  1  Limited visualization of a complex-appearing cystic structure in the right ovary measuring 2 6 cm  Recommend follow-up pelvic ultrasound in 6-12 weeks  2   Left ovary not visualized  Vital Signs: BP 93/63   Pulse 82   Temp 98 8 °F (37 1 °C) (Oral)   Resp 17   Ht 5' 5 25" (1 657 m)   Wt 65 9 kg (145 lb 4 5 oz)   SpO2 97%   BMI 23 99 kg/m²     Medications:   busPIRone, 5 mg, Oral, Q24H  calcium citrate, 950 mg, Oral, Daily  cetirizine, 10 mg, Oral, Daily  cholecalciferol, 1,200 Units, Oral, Daily  cromolyn, 200 mg, Oral, 4x Daily (AC & HS)  diclofenac, 75 mg, Oral, BID  [START ON 3/10/2021] ergocalciferol, 50,000 Units, Oral, Weekly  heparin (porcine), 5,000 Units, Subcutaneous, Q8H KARISSA  levothyroxine, 50 mcg, Oral, Early Morning  liothyronine, 5 mcg, Oral, Daily  magnesium sulfate, 2 g, Intravenous, Once  montelukast, 10 mg, Oral, BID  potassium phosphate, 12 mmol, Intravenous, Once  Vitamin B-2, 400 mg, Oral, Daily    Continuous IV Infusions:  sodium chloride, 75 mL/hr, Intravenous, Continuous    PRN Meds:  diphenhydrAMINE, 50 mg, Intravenous, Q4H PRN-used x6/24hrs  HYDROmorphone, 0 5 mg, Intravenous, Q3H PRN-used x9/24hrs  hydrOXYzine HCL, 25 mg, Oral, Q6H PRN-used x1  LORazepam, 0 5 mg, Intravenous, Q4H PRN-used x7/24hrs  ondansetron, 4 mg, Intravenous, Q6H PRN  temazepam, 30 mg, Oral, HS PRN    Discharge Plan: tbd    Network Utilization Review Department  ATTENTION: Please call with any questions or concerns to [phone] and carefully listen to the prompts so that you are directed to the right person   All voicemails are confidential   Gilberto Pronto all requests for admission clinical reviews, approved or denied determinations and any other requests to dedicated fax number below belonging to the campus where the patient is receiving treatment   List of dedicated fax numbers for the Facilities:  [address] [place] [address] DENIALS (Administrative/Medical Necessity) [phone]   [address] (Maternity/NICU/Pediatrics) [phone] [address] 40 [address] [facility] [address] Monica Pineda 2192 (  [place] Marley "Angella" 103) [address] [address] Jesusita Ritchie 1481 [address] [place] [address] [phone]

## 2021-03-05 NOTE — ASSESSMENT & PLAN NOTE
Patient admits to 15 diarrheal episodes over the past 24 hours prior to admission  History of IBD  Enteric panel, C diff, and other stool studies per GI ordered- specimen not collected given diarrhea has currently resolved  Holding off antibiotics and steroids for now pending stool specimen needing to be collected  GI consultation appreciated

## 2021-03-06 ENCOUNTER — APPOINTMENT (INPATIENT)
Dept: RADIOLOGY | Facility: HOSPITAL | Age: 46
DRG: 386 | End: 2021-03-06
Payer: COMMERCIAL

## 2021-03-06 LAB
ALBUMIN SERPL BCP-MCNC: 2.9 G/DL (ref 3.5–5)
ALP SERPL-CCNC: 46 U/L (ref 46–116)
ALT SERPL W P-5'-P-CCNC: 40 U/L (ref 12–78)
AMYLASE SERPL-CCNC: 54 IU/L (ref 25–115)
ANION GAP SERPL CALCULATED.3IONS-SCNC: 6 MMOL/L (ref 4–13)
AST SERPL W P-5'-P-CCNC: 24 U/L (ref 5–45)
BASOPHILS # BLD AUTO: 0.16 THOUSANDS/ΜL (ref 0–0.1)
BASOPHILS NFR BLD AUTO: 2 % (ref 0–1)
BILIRUB DIRECT SERPL-MCNC: 0.04 MG/DL (ref 0–0.2)
BILIRUB SERPL-MCNC: 0.2 MG/DL (ref 0.2–1)
BUN SERPL-MCNC: 9 MG/DL (ref 5–25)
CALCIUM SERPL-MCNC: 8.4 MG/DL (ref 8.3–10.1)
CHLORIDE SERPL-SCNC: 106 MMOL/L (ref 100–108)
CO2 SERPL-SCNC: 27 MMOL/L (ref 21–32)
CREAT SERPL-MCNC: 0.76 MG/DL (ref 0.6–1.3)
EOSINOPHIL # BLD AUTO: 0.56 THOUSAND/ΜL (ref 0–0.61)
EOSINOPHIL NFR BLD AUTO: 8 % (ref 0–6)
ERYTHROCYTE [DISTWIDTH] IN BLOOD BY AUTOMATED COUNT: 13.8 % (ref 11.6–15.1)
GFR SERPL CREATININE-BSD FRML MDRD: 93 ML/MIN/1.73SQ M
GLUCOSE SERPL-MCNC: 74 MG/DL (ref 65–140)
HCT VFR BLD AUTO: 40.4 % (ref 34.8–46.1)
HGB BLD-MCNC: 12.7 G/DL (ref 11.5–15.4)
IMM GRANULOCYTES # BLD AUTO: 0.02 THOUSAND/UL (ref 0–0.2)
IMM GRANULOCYTES NFR BLD AUTO: 0 % (ref 0–2)
LIPASE SERPL-CCNC: 76 U/L (ref 73–393)
LYMPHOCYTES # BLD AUTO: 2.47 THOUSANDS/ΜL (ref 0.6–4.47)
LYMPHOCYTES NFR BLD AUTO: 35 % (ref 14–44)
MAGNESIUM SERPL-MCNC: 2.2 MG/DL (ref 1.6–2.6)
MCH RBC QN AUTO: 30.3 PG (ref 26.8–34.3)
MCHC RBC AUTO-ENTMCNC: 31.4 G/DL (ref 31.4–37.4)
MCV RBC AUTO: 96 FL (ref 82–98)
MONOCYTES # BLD AUTO: 0.72 THOUSAND/ΜL (ref 0.17–1.22)
MONOCYTES NFR BLD AUTO: 10 % (ref 4–12)
NEUTROPHILS # BLD AUTO: 3.07 THOUSANDS/ΜL (ref 1.85–7.62)
NEUTS SEG NFR BLD AUTO: 45 % (ref 43–75)
NRBC BLD AUTO-RTO: 0 /100 WBCS
PHOSPHATE SERPL-MCNC: 2.8 MG/DL (ref 2.7–4.5)
PLATELET # BLD AUTO: 454 THOUSANDS/UL (ref 149–390)
PMV BLD AUTO: 8.8 FL (ref 8.9–12.7)
POTASSIUM SERPL-SCNC: 4.2 MMOL/L (ref 3.5–5.3)
PROT SERPL-MCNC: 6.4 G/DL (ref 6.4–8.2)
RBC # BLD AUTO: 4.19 MILLION/UL (ref 3.81–5.12)
SODIUM SERPL-SCNC: 139 MMOL/L (ref 136–145)
WBC # BLD AUTO: 7 THOUSAND/UL (ref 4.31–10.16)

## 2021-03-06 PROCEDURE — 99232 SBSQ HOSP IP/OBS MODERATE 35: CPT | Performed by: PHYSICIAN ASSISTANT

## 2021-03-06 PROCEDURE — 85025 COMPLETE CBC W/AUTO DIFF WBC: CPT | Performed by: PHYSICIAN ASSISTANT

## 2021-03-06 PROCEDURE — 83690 ASSAY OF LIPASE: CPT | Performed by: PHYSICIAN ASSISTANT

## 2021-03-06 PROCEDURE — 83735 ASSAY OF MAGNESIUM: CPT | Performed by: PHYSICIAN ASSISTANT

## 2021-03-06 PROCEDURE — 84100 ASSAY OF PHOSPHORUS: CPT | Performed by: PHYSICIAN ASSISTANT

## 2021-03-06 PROCEDURE — 74022 RADEX COMPL AQT ABD SERIES: CPT

## 2021-03-06 PROCEDURE — 80048 BASIC METABOLIC PNL TOTAL CA: CPT | Performed by: PHYSICIAN ASSISTANT

## 2021-03-06 PROCEDURE — 80076 HEPATIC FUNCTION PANEL: CPT | Performed by: PHYSICIAN ASSISTANT

## 2021-03-06 PROCEDURE — 82150 ASSAY OF AMYLASE: CPT | Performed by: PHYSICIAN ASSISTANT

## 2021-03-06 RX ADMIN — DIPHENHYDRAMINE HYDROCHLORIDE 50 MG: 50 INJECTION, SOLUTION INTRAMUSCULAR; INTRAVENOUS at 10:11

## 2021-03-06 RX ADMIN — POLYETHYLENE GLYCOL 3350 17 G: 17 POWDER, FOR SOLUTION ORAL at 08:25

## 2021-03-06 RX ADMIN — LEVOTHYROXINE SODIUM 50 MCG: 0.05 TABLET ORAL at 05:24

## 2021-03-06 RX ADMIN — DIPHENHYDRAMINE HYDROCHLORIDE 50 MG: 50 INJECTION, SOLUTION INTRAMUSCULAR; INTRAVENOUS at 06:11

## 2021-03-06 RX ADMIN — HYDROMORPHONE HYDROCHLORIDE 0.5 MG: 1 INJECTION, SOLUTION INTRAMUSCULAR; INTRAVENOUS; SUBCUTANEOUS at 23:22

## 2021-03-06 RX ADMIN — LORAZEPAM 0.5 MG: 2 INJECTION INTRAMUSCULAR; INTRAVENOUS at 12:15

## 2021-03-06 RX ADMIN — SODIUM CHLORIDE 75 ML/HR: 0.9 INJECTION, SOLUTION INTRAVENOUS at 05:26

## 2021-03-06 RX ADMIN — LORAZEPAM 0.5 MG: 2 INJECTION INTRAMUSCULAR; INTRAVENOUS at 02:07

## 2021-03-06 RX ADMIN — DIPHENHYDRAMINE HYDROCHLORIDE 50 MG: 50 INJECTION, SOLUTION INTRAMUSCULAR; INTRAVENOUS at 15:22

## 2021-03-06 RX ADMIN — ONDANSETRON 4 MG: 2 INJECTION INTRAMUSCULAR; INTRAVENOUS at 23:22

## 2021-03-06 RX ADMIN — ONDANSETRON 4 MG: 2 INJECTION INTRAMUSCULAR; INTRAVENOUS at 12:23

## 2021-03-06 RX ADMIN — BISACODYL 5 MG: 5 TABLET, COATED ORAL at 17:17

## 2021-03-06 RX ADMIN — HYDROMORPHONE HYDROCHLORIDE 0.5 MG: 1 INJECTION, SOLUTION INTRAMUSCULAR; INTRAVENOUS; SUBCUTANEOUS at 15:23

## 2021-03-06 RX ADMIN — HYDROMORPHONE HYDROCHLORIDE 0.5 MG: 1 INJECTION, SOLUTION INTRAMUSCULAR; INTRAVENOUS; SUBCUTANEOUS at 05:21

## 2021-03-06 RX ADMIN — DIPHENHYDRAMINE HYDROCHLORIDE 50 MG: 50 INJECTION, SOLUTION INTRAMUSCULAR; INTRAVENOUS at 02:07

## 2021-03-06 RX ADMIN — LORAZEPAM 0.5 MG: 2 INJECTION INTRAMUSCULAR; INTRAVENOUS at 06:11

## 2021-03-06 RX ADMIN — LORAZEPAM 0.5 MG: 2 INJECTION INTRAMUSCULAR; INTRAVENOUS at 17:17

## 2021-03-06 RX ADMIN — HYDROMORPHONE HYDROCHLORIDE 0.5 MG: 1 INJECTION, SOLUTION INTRAMUSCULAR; INTRAVENOUS; SUBCUTANEOUS at 12:12

## 2021-03-06 RX ADMIN — HYDROMORPHONE HYDROCHLORIDE 0.5 MG: 1 INJECTION, SOLUTION INTRAMUSCULAR; INTRAVENOUS; SUBCUTANEOUS at 08:25

## 2021-03-06 RX ADMIN — MONTELUKAST 10 MG: 10 TABLET, FILM COATED ORAL at 08:25

## 2021-03-06 RX ADMIN — DIPHENHYDRAMINE HYDROCHLORIDE 50 MG: 50 INJECTION, SOLUTION INTRAMUSCULAR; INTRAVENOUS at 19:58

## 2021-03-06 RX ADMIN — CETIRIZINE HYDROCHLORIDE 10 MG: 10 TABLET, FILM COATED ORAL at 08:25

## 2021-03-06 RX ADMIN — SODIUM CHLORIDE 75 ML/HR: 0.9 INJECTION, SOLUTION INTRAVENOUS at 18:04

## 2021-03-06 RX ADMIN — LORAZEPAM 0.5 MG: 2 INJECTION INTRAMUSCULAR; INTRAVENOUS at 23:22

## 2021-03-06 RX ADMIN — HYDROMORPHONE HYDROCHLORIDE 0.5 MG: 1 INJECTION, SOLUTION INTRAMUSCULAR; INTRAVENOUS; SUBCUTANEOUS at 18:25

## 2021-03-06 RX ADMIN — HYDROMORPHONE HYDROCHLORIDE 0.5 MG: 1 INJECTION, SOLUTION INTRAMUSCULAR; INTRAVENOUS; SUBCUTANEOUS at 01:39

## 2021-03-06 RX ADMIN — MONTELUKAST 10 MG: 10 TABLET, FILM COATED ORAL at 20:31

## 2021-03-06 NOTE — ASSESSMENT & PLAN NOTE
Patient is seen/managed by physician in [place]   -Dr Josselin Mckeon - [phone]  continue with IV Benadryl as needed   Avoid known triggers

## 2021-03-06 NOTE — ASSESSMENT & PLAN NOTE
Ongoing pain, worse with food intake  Decrease diet to clear liquids  Will order obstruction series, liver function testing and lipase  Patient admits to right upper quadrant pain accompanied with nausea vomiting and 15 diarrheal episodes on the day prior to admission  P r n  Zofran  Stool studies ordered - still pending collection    CT abdomen/pelvis: Bowel wall thickening of the descending colon extending to the rectum suggestive of colitis of an infectious/inflammatory etiology  2 6 cm fluid attenuation structure in the right adnexal region may represent an ovarian cyst in the absence of a history of oophorectomy  Transvaginal US: Limited visualization of a complex-appearing cystic structure in the right ovary measuring 2 6 cm  Recommend follow-up pelvic ultrasound in 6-12 weeks

## 2021-03-06 NOTE — ASSESSMENT & PLAN NOTE
Patient admits to 15 diarrheal episodes over the past 24 hours prior to admission  History of IBD  Enteric panel, C diff, and other stool studies per GI ordered- specimen not collected given diarrhea has currently resolved  C diff therefore cancelled, other studies pending  Holding off antibiotics and steroids for now pending stool specimen needing to be collected  GI consultation appreciated

## 2021-03-06 NOTE — PROGRESS NOTES
Progress Note - Andre Jenkins 1975, 48 y o  female MRN: [id number]    Unit/Bed#: 407-01 Encounter: [id number]    Primary Care Provider: Tha Li MD   Date and time admitted to hospital: 3/3/2021  1:26 PM        * Abdominal pain  Assessment & Plan  Ongoing pain, worse with food intake  Decrease diet to clear liquids  Will order obstruction series, liver function testing and lipase  Patient admits to right upper quadrant pain accompanied with nausea vomiting and 15 diarrheal episodes on the day prior to admission  P r n  Zofran  Stool studies ordered - still pending collection    CT abdomen/pelvis: Bowel wall thickening of the descending colon extending to the rectum suggestive of colitis of an infectious/inflammatory etiology  2 6 cm fluid attenuation structure in the right adnexal region may represent an ovarian cyst in the absence of a history of oophorectomy  Transvaginal US: Limited visualization of a complex-appearing cystic structure in the right ovary measuring 2 6 cm  Recommend follow-up pelvic ultrasound in 6-12 weeks  Thrombocytosis (HCC)  Assessment & Plan  Platelet count of 829 on admission  Platelet count of 795  Trend CBC daily    Mast cell activation syndrome [facility])  Assessment & Plan  Patient is seen/managed by physician in [place]   -Dr Cynthia Laws - [phone]  continue with IV Benadryl as needed   Avoid known triggers  Leukocytosis  Assessment & Plan  As evidenced by white blood cell count of 10 61 on admission, peaked at 13 40- now resolved  Patient with known mast cell activation and IBD utilizing home steroids  Trend CBC daily        Hypothyroidism (acquired)  Assessment & Plan  Patient reportedly not taking levothyroxine for last several weeks  Patient currently refusing this  Generalized anxiety disorder  Assessment & Plan  Patient is less anxious today     Patient reports can only take her own home prescription pills-currently will provide Ativan 0 5 p r n  As needed for anxiety  Restarted home regimen with patient's home prescriptions    Hypokalemia  Assessment & Plan  Potassium level 3 2 on admission  Resolved since receiving IV Potassium 40 mEq  Trend potassium q d  Replete as needed    Diarrhea  Assessment & Plan  Patient admits to 15 diarrheal episodes over the past 24 hours prior to admission  History of IBD  Enteric panel, C diff, and other stool studies per GI ordered- specimen not collected given diarrhea has currently resolved  C diff therefore cancelled, other studies pending  Holding off antibiotics and steroids for now pending stool specimen needing to be collected  GI consultation appreciated  VTE Pharmacologic Prophylaxis:   Pharmacologic: low risk  Mechanical VTE Prophylaxis in Place: Yes        Time Spent for Care: 30 minutes  More than 50% of total time spent on counseling and coordination of care as described above  Current Length of Stay: 3 day(s)    Current Patient Status: Inpatient   Certification Statement: The patient will continue to require additional inpatient hospital stay due to Need for IV fluid hydration, pending stool studies  Discharge Plan / Estimated Discharge Date: TBD      Code Status: Level 1 - Full Code      Subjective:   Patient notes ongoing and worsening abdominal pain with eating solid food  She also as having ongoing nausea without vomiting  No further diarrhea    Objective:   Vitals:   Temp (24hrs), Av °F (37 2 °C), Min:98 7 °F (37 1 °C), Max:99 2 °F (37 3 °C)    Temp:  [98 7 °F (37 1 °C)-99 2 °F (37 3 °C)] 99 2 °F (37 3 °C)  Resp:  [17-18] 18  BP: ()/(69-83) 108/69  SpO2:  [98 %] 98 %  Body mass index is 23 99 kg/m²  Input and Output Summary (last 24 hours):      Intake/Output Summary (Last 24 hours) at 3/6/2021 1706  Last data filed at 3/6/2021 1230  Gross per 24 hour   Intake 760 ml   Output --   Net 760 ml       Physical Exam:   Physical Exam  Vitals signs and nursing note reviewed  Constitutional:       General: She is not in acute distress  Appearance: She is not ill-appearing or diaphoretic  HENT:      Head: Normocephalic  Mouth/Throat:      Mouth: Mucous membranes are dry  Cardiovascular:      Rate and Rhythm: Normal rate and regular rhythm  Pulmonary:      Effort: Pulmonary effort is normal       Breath sounds: Normal breath sounds  No wheezing or rales  Abdominal:      General: There is distension  Tenderness: There is abdominal tenderness  Musculoskeletal:      Right lower leg: No edema  Left lower leg: No edema  Skin:     General: Skin is warm and dry  Neurological:      Mental Status: She is alert and oriented to person, place, and time  Psychiatric:         Mood and Affect: Mood normal            Additional Data:     Labs:    Results from last 7 days   Lab Units 03/06/21  0512   WBC Thousand/uL 7 00   HEMOGLOBIN g/dL 12 7   HEMATOCRIT % 40 4   PLATELETS Thousands/uL 454*   NEUTROS PCT % 45   LYMPHS PCT % 35   MONOS PCT % 10   EOS PCT % 8*     Results from last 7 days   Lab Units 03/06/21  0512  03/04/21  0423   POTASSIUM mmol/L 4 2   < > 4 3   CHLORIDE mmol/L 106   < > 104   CO2 mmol/L 27   < > 24   BUN mg/dL 9   < > 7   CREATININE mg/dL 0 76   < > 0 73   CALCIUM mg/dL 8 4   < > 8 9   ALK PHOS U/L  --   --  51   ALT U/L  --   --  45   AST U/L  --   --  21    < > = values in this interval not displayed  Results from last 7 days   Lab Units 03/03/21  1415   INR  0 94       * I Have Reviewed All Lab Data Listed Above  * Additional Pertinent Lab Tests Reviewed: All Labs Within Last 24 Hours Reviewed    Imaging:  Imaging Reports Reviewed Today Include:  No new imaging to review          Recent Cultures (last 7 days):           Last 24 Hours Medication List:   Current Facility-Administered Medications   Medication Dose Route Frequency Provider Last Rate    bisacodyl  5 mg Oral Once Chandler Monk PA-C      busPIRone  5 mg Oral Q24H JONATHAN Vogel      calcium citrate  950 mg Oral Daily Khushbu Fitch PA-C      cetirizine  10 mg Oral Daily Khushbu Fitch PA-C      cholecalciferol  1,200 Units Oral Daily Khushbu Fitch PA-C      cromolyn  200 mg Oral 4x Daily (AC & HS) Khushbu Fitch PA-C      diclofenac  75 mg Oral BID Khushbu Fitch PA-C      diphenhydrAMINE  50 mg Intravenous Q4H PRN Khushbu Fitch PA-C      [START ON 3/10/2021] ergocalciferol  50,000 Units Oral Weekly Khushbu Fitch PA-C      heparin (porcine)  5,000 Units Subcutaneous [facility] JONATHAN Vogel      HYDROmorphone  0 5 mg Intravenous Q3H PRN JONATHAN Vogel      hydrOXYzine HCL  25 mg Oral Q6H PRN Khushbu Fitch PA-C      levothyroxine  50 mcg Oral Early Morning Khushbu Fitch PA-C      liothyronine  5 mcg Oral Daily Khushbu Fitch PA-C      LORazepam  0 5 mg Intravenous Q4H PRN JONATHAN Vogel      montelukast  10 mg Oral BID Khushbu Fitch PA-C      ondansetron  4 mg Intravenous Q6H PRN JONATHAN Jim      polyethylene glycol  17 g Oral Daily Khushbu Fitch PA-C      sodium chloride  75 mL/hr Intravenous Continuous JONATHAN Vogel 75 mL/hr (03/06/21 0526)    temazepam  30 mg Oral HS PRN Khushbu Fitch PA-C      Vitamin B-2  400 mg Oral Daily Khushbu Fitch PA-C          Today, Patient Was Seen By: Ricardo England PA-C    ** Please Note: Dragon 360 Dictation voice to text software may have been used in the creation of this document   **

## 2021-03-06 NOTE — ASSESSMENT & PLAN NOTE
As evidenced by white blood cell count of 10 61 on admission, peaked at 13 40- now resolved     Patient with known mast cell activation and IBD utilizing home steroids  Trend CBC daily

## 2021-03-07 PROBLEM — K52.9 INFLAMMATORY BOWEL DISEASE: Status: ACTIVE | Noted: 2021-03-07

## 2021-03-07 LAB
ALBUMIN SERPL BCP-MCNC: 2.8 G/DL (ref 3.5–5)
ALP SERPL-CCNC: 43 U/L (ref 46–116)
ALT SERPL W P-5'-P-CCNC: 34 U/L (ref 12–78)
ANION GAP SERPL CALCULATED.3IONS-SCNC: 6 MMOL/L (ref 4–13)
AST SERPL W P-5'-P-CCNC: 17 U/L (ref 5–45)
BASOPHILS # BLD AUTO: 0.14 THOUSANDS/ΜL (ref 0–0.1)
BASOPHILS NFR BLD AUTO: 2 % (ref 0–1)
BILIRUB SERPL-MCNC: 0.4 MG/DL (ref 0.2–1)
BUN SERPL-MCNC: 6 MG/DL (ref 5–25)
CALCIUM ALBUM COR SERPL-MCNC: 9.2 MG/DL (ref 8.3–10.1)
CALCIUM SERPL-MCNC: 8.2 MG/DL (ref 8.3–10.1)
CHLORIDE SERPL-SCNC: 106 MMOL/L (ref 100–108)
CO2 SERPL-SCNC: 27 MMOL/L (ref 21–32)
CREAT SERPL-MCNC: 0.81 MG/DL (ref 0.6–1.3)
EOSINOPHIL # BLD AUTO: 0.72 THOUSAND/ΜL (ref 0–0.61)
EOSINOPHIL NFR BLD AUTO: 9 % (ref 0–6)
ERYTHROCYTE [DISTWIDTH] IN BLOOD BY AUTOMATED COUNT: 13.5 % (ref 11.6–15.1)
GFR SERPL CREATININE-BSD FRML MDRD: 86 ML/MIN/1.73SQ M
GLUCOSE SERPL-MCNC: 80 MG/DL (ref 65–140)
HCT VFR BLD AUTO: 39.5 % (ref 34.8–46.1)
HGB BLD-MCNC: 12.5 G/DL (ref 11.5–15.4)
IMM GRANULOCYTES # BLD AUTO: 0.02 THOUSAND/UL (ref 0–0.2)
IMM GRANULOCYTES NFR BLD AUTO: 0 % (ref 0–2)
LYMPHOCYTES # BLD AUTO: 2.46 THOUSANDS/ΜL (ref 0.6–4.47)
LYMPHOCYTES NFR BLD AUTO: 31 % (ref 14–44)
MCH RBC QN AUTO: 30.4 PG (ref 26.8–34.3)
MCHC RBC AUTO-ENTMCNC: 31.6 G/DL (ref 31.4–37.4)
MCV RBC AUTO: 96 FL (ref 82–98)
MONOCYTES # BLD AUTO: 0.86 THOUSAND/ΜL (ref 0.17–1.22)
MONOCYTES NFR BLD AUTO: 11 % (ref 4–12)
NEUTROPHILS # BLD AUTO: 3.65 THOUSANDS/ΜL (ref 1.85–7.62)
NEUTS SEG NFR BLD AUTO: 47 % (ref 43–75)
NRBC BLD AUTO-RTO: 0 /100 WBCS
PLATELET # BLD AUTO: 439 THOUSANDS/UL (ref 149–390)
PMV BLD AUTO: 8.6 FL (ref 8.9–12.7)
POTASSIUM SERPL-SCNC: 4 MMOL/L (ref 3.5–5.3)
PROT SERPL-MCNC: 6.4 G/DL (ref 6.4–8.2)
RBC # BLD AUTO: 4.11 MILLION/UL (ref 3.81–5.12)
SODIUM SERPL-SCNC: 139 MMOL/L (ref 136–145)
WBC # BLD AUTO: 7.85 THOUSAND/UL (ref 4.31–10.16)

## 2021-03-07 PROCEDURE — 89055 LEUKOCYTE ASSESSMENT FECAL: CPT | Performed by: PHYSICIAN ASSISTANT

## 2021-03-07 PROCEDURE — 87505 NFCT AGENT DETECTION GI: CPT | Performed by: NURSE PRACTITIONER

## 2021-03-07 PROCEDURE — 80053 COMPREHEN METABOLIC PANEL: CPT | Performed by: PHYSICIAN ASSISTANT

## 2021-03-07 PROCEDURE — 87177 OVA AND PARASITES SMEARS: CPT | Performed by: NURSE PRACTITIONER

## 2021-03-07 PROCEDURE — 87209 SMEAR COMPLEX STAIN: CPT | Performed by: NURSE PRACTITIONER

## 2021-03-07 PROCEDURE — 85025 COMPLETE CBC W/AUTO DIFF WBC: CPT | Performed by: PHYSICIAN ASSISTANT

## 2021-03-07 PROCEDURE — 99232 SBSQ HOSP IP/OBS MODERATE 35: CPT | Performed by: PHYSICIAN ASSISTANT

## 2021-03-07 PROCEDURE — 83993 ASSAY FOR CALPROTECTIN FECAL: CPT | Performed by: NURSE PRACTITIONER

## 2021-03-07 RX ORDER — METHYLPREDNISOLONE SODIUM SUCCINATE 40 MG/ML
40 INJECTION, POWDER, LYOPHILIZED, FOR SOLUTION INTRAMUSCULAR; INTRAVENOUS EVERY 12 HOURS SCHEDULED
Status: DISCONTINUED | OUTPATIENT
Start: 2021-03-07 | End: 2021-03-11

## 2021-03-07 RX ORDER — METHYLPREDNISOLONE SODIUM SUCCINATE 125 MG/2ML
60 INJECTION, POWDER, LYOPHILIZED, FOR SOLUTION INTRAMUSCULAR; INTRAVENOUS ONCE
Status: COMPLETED | OUTPATIENT
Start: 2021-03-07 | End: 2021-03-07

## 2021-03-07 RX ORDER — HYDROMORPHONE HCL/PF 1 MG/ML
0.5 SYRINGE (ML) INJECTION EVERY 6 HOURS PRN
Status: DISCONTINUED | OUTPATIENT
Start: 2021-03-07 | End: 2021-03-08

## 2021-03-07 RX ADMIN — MONTELUKAST 10 MG: 10 TABLET, FILM COATED ORAL at 09:37

## 2021-03-07 RX ADMIN — CETIRIZINE HYDROCHLORIDE 10 MG: 10 TABLET, FILM COATED ORAL at 09:37

## 2021-03-07 RX ADMIN — DIPHENHYDRAMINE HYDROCHLORIDE 50 MG: 50 INJECTION, SOLUTION INTRAMUSCULAR; INTRAVENOUS at 09:46

## 2021-03-07 RX ADMIN — DIPHENHYDRAMINE HYDROCHLORIDE 50 MG: 50 INJECTION, SOLUTION INTRAMUSCULAR; INTRAVENOUS at 19:35

## 2021-03-07 RX ADMIN — SODIUM CHLORIDE 75 ML/HR: 0.9 INJECTION, SOLUTION INTRAVENOUS at 20:30

## 2021-03-07 RX ADMIN — LORAZEPAM 0.5 MG: 2 INJECTION INTRAMUSCULAR; INTRAVENOUS at 19:35

## 2021-03-07 RX ADMIN — METHYLPREDNISOLONE SODIUM SUCCINATE 40 MG: 40 INJECTION, POWDER, FOR SOLUTION INTRAMUSCULAR; INTRAVENOUS at 17:59

## 2021-03-07 RX ADMIN — HYDROMORPHONE HYDROCHLORIDE 0.5 MG: 1 INJECTION, SOLUTION INTRAMUSCULAR; INTRAVENOUS; SUBCUTANEOUS at 22:14

## 2021-03-07 RX ADMIN — DIPHENHYDRAMINE HYDROCHLORIDE 50 MG: 50 INJECTION, SOLUTION INTRAMUSCULAR; INTRAVENOUS at 05:38

## 2021-03-07 RX ADMIN — POLYETHYLENE GLYCOL 3350 17 G: 17 POWDER, FOR SOLUTION ORAL at 09:37

## 2021-03-07 RX ADMIN — Medication 1200 UNITS: at 09:38

## 2021-03-07 RX ADMIN — DIPHENHYDRAMINE HYDROCHLORIDE 50 MG: 50 INJECTION, SOLUTION INTRAMUSCULAR; INTRAVENOUS at 14:41

## 2021-03-07 RX ADMIN — LORAZEPAM 0.5 MG: 2 INJECTION INTRAMUSCULAR; INTRAVENOUS at 23:59

## 2021-03-07 RX ADMIN — HYDROMORPHONE HYDROCHLORIDE 0.5 MG: 1 INJECTION, SOLUTION INTRAMUSCULAR; INTRAVENOUS; SUBCUTANEOUS at 07:39

## 2021-03-07 RX ADMIN — METHYLPREDNISOLONE SODIUM SUCCINATE 60 MG: 125 INJECTION, POWDER, FOR SOLUTION INTRAMUSCULAR; INTRAVENOUS at 13:41

## 2021-03-07 RX ADMIN — MONTELUKAST 10 MG: 10 TABLET, FILM COATED ORAL at 22:20

## 2021-03-07 RX ADMIN — DIPHENHYDRAMINE HYDROCHLORIDE 50 MG: 50 INJECTION, SOLUTION INTRAMUSCULAR; INTRAVENOUS at 23:59

## 2021-03-07 RX ADMIN — HYDROMORPHONE HYDROCHLORIDE 0.5 MG: 1 INJECTION, SOLUTION INTRAMUSCULAR; INTRAVENOUS; SUBCUTANEOUS at 15:47

## 2021-03-07 RX ADMIN — DIPHENHYDRAMINE HYDROCHLORIDE 50 MG: 50 INJECTION, SOLUTION INTRAMUSCULAR; INTRAVENOUS at 01:18

## 2021-03-07 RX ADMIN — LORAZEPAM 0.5 MG: 2 INJECTION INTRAMUSCULAR; INTRAVENOUS at 09:46

## 2021-03-07 RX ADMIN — LORAZEPAM 0.5 MG: 2 INJECTION INTRAMUSCULAR; INTRAVENOUS at 14:41

## 2021-03-07 RX ADMIN — SODIUM CHLORIDE 75 ML/HR: 0.9 INJECTION, SOLUTION INTRAVENOUS at 07:39

## 2021-03-07 RX ADMIN — LEVOTHYROXINE SODIUM 50 MCG: 0.05 TABLET ORAL at 05:38

## 2021-03-07 NOTE — ASSESSMENT & PLAN NOTE
Uncertain if crohn's vs  UC based on GI notes  On IV Stelara infusions  Initially held IV steroids pending stool sample collection, need to rule out bacterial colitis, however, given discontinuation of diarrhea for several days, resolution of leukocytosis, and afebrile state, safe to start IV steroids even if stool samples still pending  Received IV solumedrol 60mg once in AM, and continue later this evening at 40mg IV BID dosing  May need inpatient GI reassessment and possible flexible sigmoidoscopy depending on clinical course  Continue with clear liquid diet for now

## 2021-03-07 NOTE — ASSESSMENT & PLAN NOTE
Patient admits to 15 diarrheal episodes over the past 24 hours prior to admission  History of IBD  Enteric panel, C diff, and other stool studies per GI ordered- specimen not collected given diarrhea has currently resolved  C diff therefore cancelled, other studies pending  Initially held off on antibiotics and steroids pending stool specimen needing to be collected  Given ongoing abdominal pain in the absence of diarrhea but with CT scan findings suggestive of colitis, will start IV steroids  GI consultation appreciated

## 2021-03-07 NOTE — PROGRESS NOTES
Progress Note - Tadeo Cao 1975, 48 y o  female MRN: [id number]    Unit/Bed#: 407-01 Encounter: [id number]    Primary Care Provider: Yany Clement MD   Date and time admitted to hospital: 3/3/2021  1:26 PM        * Abdominal pain  Assessment & Plan  Patient admits to right upper quadrant pain accompanied with nausea vomiting and 15 diarrheal episodes on the day prior to admission  Stool studies ordered - pending results  Formed stool collected today  CT abdomen/pelvis: Bowel wall thickening of the descending colon extending to the rectum suggestive of colitis of an infectious/inflammatory etiology  2 6 cm fluid attenuation structure in the right adnexal region may represent an ovarian cyst in the absence of a history of oophorectomy  Transvaginal US: Limited visualization of a complex-appearing cystic structure in the right ovary measuring 2 6 cm  Recommend follow-up pelvic ultrasound in 6-12 weeks  3/7 - Ongoing pain, worse with food intake  Only tolerating clear liquid diet, but no further N/V  No diarrhea  Obstruction series - negative  Liver function testing and lipase - normal       Inflammatory bowel disease  Assessment & Plan  Uncertain if crohn's vs  UC based on GI notes  On IV Stelara infusions  Initially held IV steroids pending stool sample collection, need to rule out bacterial colitis, however, given discontinuation of diarrhea for several days, resolution of leukocytosis, and afebrile state, safe to start IV steroids even if stool samples still pending  Received IV solumedrol 60mg once in AM, and continue later this evening at 40mg IV BID dosing  May need inpatient GI reassessment and possible flexible sigmoidoscopy depending on clinical course  Continue with clear liquid diet for now  Thrombocytosis (HCC)  Assessment & Plan  Platelet count of 988 on admission     Platelet count of 453  Trend CBC daily    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HCC)  Assessment & Plan  Patient is seen/managed by physician in [place]   -  Dr Knapp Room at [facility] - [phone]  Spoke with allergist on-call today who noted the patient's symptoms who recommended Steroid taper, no need for transfer  Due to ongoing symptoms and inability to discharge from hospital will continue with IV steroids 40 mg b i d  continue with IV Benadryl as needed   Avoid known triggers  Leukocytosis  Assessment & Plan  As evidenced by white blood cell count of 10 61 on admission, peaked at 13 40- now resolved  Patient with known mast cell activation and IBD utilizing home steroids  Trend CBC daily        Hypothyroidism (acquired)  Assessment & Plan  Patient reportedly not taking levothyroxine for last several weeks  Patient currently refusing this  Generalized anxiety disorder  Assessment & Plan  Patient is less anxious today  Continue Buspar, atarax, temazepam   Patient reports can only take her own home prescription pills-currently will provide Ativan 0 5 p r n  As needed for anxiety      Hypokalemia  Assessment & Plan  Potassium level 3 2 on admission  Resolved since receiving IV Potassium 40 mEq  Trend potassium q d  Replete as needed    Diarrhea  Assessment & Plan  Patient admits to 15 diarrheal episodes over the past 24 hours prior to admission  History of IBD  Enteric panel, C diff, and other stool studies per GI ordered- specimen not collected given diarrhea has currently resolved  C diff therefore cancelled, other studies pending  Initially held off on antibiotics and steroids pending stool specimen needing to be collected  Given ongoing abdominal pain in the absence of diarrhea but with CT scan findings suggestive of colitis, will start IV steroids  GI consultation appreciated           VTE Pharmacologic Prophylaxis:   Pharmacologic: Heparin  Mechanical VTE Prophylaxis in Place: Yes    Patient Centered Rounds: I have performed bedside rounds with nursing staff today     Discussions with Specialists or Other Care Team Provider: discussed with GI on call - Dr Anahi Arriaza, and on call allergist for [facility] and women's [facility], Dr Ej Don        Time Spent for Care: 30 minutes  More than 50% of total time spent on counseling and coordination of care as described above  Current Length of Stay: 4 day(s)    Current Patient Status: Inpatient   Certification Statement: The patient will continue to require additional inpatient hospital stay due to need for IV steroids, possible inpatient GI re-evaluation  Discharge Plan / Estimated Discharge Date: TBD based on clinical course  Code Status: Level 1 - Full Code      Subjective:   Patient is having increasing and ongoing abdominal pain today  Did have a few formed bowel movements but feels she is straining to go  Feels fluid intake and movement makes pain worse  No further vomiting but intermittent nausea  Objective:     Vitals:   Temp (24hrs), Av 8 °F (37 1 °C), Min:98 4 °F (36 9 °C), Max:99 2 °F (37 3 °C)    Temp:  [98 4 °F (36 9 °C)-99 2 °F (37 3 °C)] 99 2 °F (37 3 °C)  HR:  [74-81] 81  Resp:  [18] 18  BP: ()/(63-79) 111/70  SpO2:  [97 %-98 %] 98 %  Body mass index is 23 99 kg/m²  Input and Output Summary (last 24 hours): Intake/Output Summary (Last 24 hours) at 3/7/2021 1812  Last data filed at 3/7/2021 1700  Gross per 24 hour   Intake 1018 75 ml   Output --   Net 1018 75 ml       Physical Exam:     Physical Exam  Vitals signs and nursing note reviewed  Constitutional:       Appearance: Normal appearance  HENT:      Head: Normocephalic  Mouth/Throat:      Mouth: Mucous membranes are dry  Cardiovascular:      Rate and Rhythm: Normal rate and regular rhythm  Heart sounds: No murmur  Pulmonary:      Effort: Pulmonary effort is normal       Breath sounds: Normal breath sounds  No wheezing  Abdominal:      General: There is no distension  Tenderness:  There is abdominal tenderness (tender epigastric and RUQ)  Skin:     General: Skin is warm and dry  Neurological:      Mental Status: She is alert and oriented to person, place, and time  Psychiatric:         Mood and Affect: Mood normal            Additional Data:     Labs:    Results from last 7 days   Lab Units 03/07/21  0512   WBC Thousand/uL 7 85   HEMOGLOBIN g/dL 12 5   HEMATOCRIT % 39 5   PLATELETS Thousands/uL 439*   NEUTROS PCT % 47   LYMPHS PCT % 31   MONOS PCT % 11   EOS PCT % 9*     Results from last 7 days   Lab Units 03/07/21  0512   POTASSIUM mmol/L 4 0   CHLORIDE mmol/L 106   CO2 mmol/L 27   BUN mg/dL 6   CREATININE mg/dL 0 81   CALCIUM mg/dL 8 2*   ALK PHOS U/L 43*   ALT U/L 34   AST U/L 17     Results from last 7 days   Lab Units 03/03/21  1415   INR  0 94       * I Have Reviewed All Lab Data Listed Above  * Additional Pertinent Lab Tests Reviewed: All Labs Within Last 24 Hours Reviewed    Imaging:  Imaging Reports Reviewed Today Include: reviewed obstruction series report - negative          Recent Cultures (last 7 days):           Last 24 Hours Medication List:   Current Facility-Administered Medications   Medication Dose Route Frequency Provider Last Rate    busPIRone  5 mg Oral Q24H JONATHAN Jim      calcium citrate  950 mg Oral Daily Saira Tolliver PA-C      cetirizine  10 mg Oral Daily Saira Tolliver PA-C      cholecalciferol  1,200 Units Oral Daily Saira Tolliver PA-C      cromolyn  200 mg Oral 4x Daily (AC & HS) Saira Tolliver PA-C      diclofenac  75 mg Oral BID Saira Tolliver PA-C      diphenhydrAMINE  50 mg Intravenous Q4H PRN Saira Tolliver PA-C      [START ON 3/10/2021] ergocalciferol  50,000 Units Oral Weekly Saira Tolliver PA-C      heparin (porcine)  5,000 Units Subcutaneous [facility] JONATHAN Sparks      HYDROmorphone  0 5 mg Intravenous Q6H PRN Yamil Lucia PA-C      hydrOXYzine HCL  25 mg Oral Q6H PRN Saira Tolliver PA-C      levothyroxine  50 mcg Oral Early Morning Miriam Elena PA-C      liothyronine  5 mcg Oral Daily Miriam Elena PA-C      LORazepam  0 5 mg Intravenous Q4H PRN Fermin Messier, CRNP      methylPREDNISolone sodium succinate  40 mg Intravenous Q12H [address] Kelleeie RANDEE Somers      montelukast  10 mg Oral BID Miriam Elena PA-C      ondansetron  4 mg Intravenous Q6H PRN Aarti Y Hollie, CRNP      polyethylene glycol  17 g Oral Daily Miriam Elena PA-C      sodium chloride  75 mL/hr Intravenous Continuous Fermin Messier, CRNP 75 mL/hr (03/07/21 0739)    temazepam  30 mg Oral HS PRN Miriam Elena PA-C      Vitamin B-2  400 mg Oral Daily Miriam Elena PA-C          Today, Patient Was Seen By: Mercedes Good PA-C    ** Please Note: Dragon 360 Dictation voice to text software may have been used in the creation of this document   **

## 2021-03-07 NOTE — ASSESSMENT & PLAN NOTE
Patient is seen/managed by physician in [place]   -  Dr Crys Daly at [facility] AND [facility] - [phone]  Spoke with allergist on-call today who noted the patient's symptoms who recommended Steroid taper, no need for transfer  Due to ongoing symptoms and inability to discharge from hospital will continue with IV steroids 40 mg b i d  continue with IV Benadryl as needed   Avoid known triggers

## 2021-03-07 NOTE — ASSESSMENT & PLAN NOTE
Patient is less anxious today  Continue Buspar, atarax, temazepam   Patient reports can only take her own home prescription pills-currently will provide Ativan 0 5 p r n   As needed for anxiety

## 2021-03-07 NOTE — ASSESSMENT & PLAN NOTE
Patient admits to right upper quadrant pain accompanied with nausea vomiting and 15 diarrheal episodes on the day prior to admission  Stool studies ordered - pending results  Formed stool collected today  CT abdomen/pelvis: Bowel wall thickening of the descending colon extending to the rectum suggestive of colitis of an infectious/inflammatory etiology  2 6 cm fluid attenuation structure in the right adnexal region may represent an ovarian cyst in the absence of a history of oophorectomy  Transvaginal US: Limited visualization of a complex-appearing cystic structure in the right ovary measuring 2 6 cm  Recommend follow-up pelvic ultrasound in 6-12 weeks  3/7 - Ongoing pain, worse with food intake  Only tolerating clear liquid diet, but no further N/V  No diarrhea    Obstruction series - negative  Liver function testing and lipase - normal

## 2021-03-07 NOTE — PLAN OF CARE
Problem: Potential for Falls  Goal: Patient will remain free of falls  Description: INTERVENTIONS:  - Assess patient frequently for physical needs  -  Identify cognitive and physical deficits and behaviors that affect risk of falls    -  [place] fall precautions as indicated by assessment   - Educate patient/family on patient safety including physical limitations  - Instruct patient to call for assistance with activity based on assessment  - Modify environment to reduce risk of injury  - Consider OT/PT consult to assist with strengthening/mobility  Outcome: Progressing     Problem: GASTROINTESTINAL - ADULT  Goal: Minimal or absence of nausea and/or vomiting  Description: INTERVENTIONS:  - Administer IV fluids if ordered to ensure adequate hydration  - Maintain NPO status until nausea and vomiting are resolved  - Nasogastric tube if ordered  - Administer ordered antiemetic medications as needed  - Provide nonpharmacologic comfort measures as appropriate  - Advance diet as tolerated, if ordered  - Consider nutrition services referral to assist patient with adequate nutrition and appropriate food choices  Outcome: Progressing  Goal: Maintains or returns to baseline bowel function  Description: INTERVENTIONS:  - Assess bowel function  - Encourage oral fluids to ensure adequate hydration  - Administer IV fluids if ordered to ensure adequate hydration  - Administer ordered medications as needed  - Encourage mobilization and activity  - Consider nutritional services referral to assist patient with adequate nutrition and appropriate food choices  Outcome: Progressing  Goal: Maintains adequate nutritional intake  Description: INTERVENTIONS:  - Monitor percentage of each meal consumed  - Identify factors contributing to decreased intake, treat as appropriate  - Assist with meals as needed  - Monitor I&O, weight, and lab values if indicated  - Obtain nutrition services referral as needed  Outcome: Progressing     Problem: METABOLIC, FLUID AND ELECTROLYTES - ADULT  Goal: Electrolytes maintained within normal limits  Description: INTERVENTIONS:  - Monitor labs and assess patient for signs and symptoms of electrolyte imbalances  - Administer electrolyte replacement as ordered  - Monitor response to electrolyte replacements, including repeat lab results as appropriate  - Instruct patient on fluid and nutrition as appropriate  Outcome: Progressing     Problem: PAIN - ADULT  Goal: Verbalizes/displays adequate comfort level or baseline comfort level  Description: Interventions:  - Encourage patient to monitor pain and request assistance  - Assess pain using appropriate pain scale (0-10 pain scale)  - Administer analgesics based on type and severity of pain and evaluate response  - Implement non-pharmacological measures as appropriate and evaluate response  - Consider cultural and social influences on pain and pain management  - Notify physician/advanced practitioner if interventions unsuccessful or patient reports new pain  Outcome: Progressing     Problem: INFECTION - ADULT  Goal: Absence or prevention of progression during hospitalization  Description: INTERVENTIONS:  - Assess and monitor for signs and symptoms of infection  - Monitor lab/diagnostic results  - Monitor all insertion sites, i e  indwelling lines, tubes, and drains  - Monitor endotracheal if appropriate and nasal secretions for changes in amount and color  - [place] appropriate cooling/warming therapies per order  - Administer medications as ordered  - Instruct and encourage patient and family to use good hand hygiene technique  - Identify and instruct in appropriate isolation precautions for identified infection/condition  Outcome: Progressing     Problem: SAFETY ADULT  Goal: Maintain or return to baseline ADL function  Description: INTERVENTIONS:  -  Assess patient's ability to carry out ADLs; assess patient's baseline for ADL function and identify physical deficits which impact ability to perform ADLs (bathing, care of mouth/teeth, toileting, grooming, dressing, etc )  - Assess/evaluate cause of self-care deficits   - Assess range of motion  - Assess patient's mobility; develop plan if impaired  - Assess patient's need for assistive devices and provide as appropriate  - Encourage maximum independence but intervene and supervise when necessary  - Involve family in performance of ADLs  - Assess for home care needs following discharge   - Consider OT consult to assist with ADL evaluation and planning for discharge  - Provide patient education as appropriate  Outcome: Progressing  Goal: Maintain or return mobility status to optimal level  Description: INTERVENTIONS:  - Assess patient's baseline mobility status (ambulation, transfers, stairs, etc )    - Identify cognitive and physical deficits and behaviors that affect mobility  - Identify mobility aids required to assist with transfers and/or ambulation (gait belt, sit-to-stand, lift, walker, cane, etc )  - [place] fall precautions as indicated by assessment  - Record patient progress and toleration of activity level on Mobility SBAR; progress patient to next Phase/Stage  - Instruct patient to call for assistance with activity based on assessment  - Consider rehabilitation consult to assist with strengthening/weightbearing, etc   Outcome: Progressing     Problem: DISCHARGE PLANNING  Goal: Discharge to home or other facility with appropriate resources  Description: INTERVENTIONS:  - Identify barriers to discharge w/patient and caregiver  - Arrange for needed discharge resources and transportation as appropriate  - Identify discharge learning needs (meds, wound care, etc )  - Arrange for interpretive services to assist at discharge as needed  - Refer to Case Management Department for coordinating discharge planning if the patient needs post-hospital services based on physician/advanced practitioner order or complex needs related to functional status, cognitive ability, or social support system  Outcome: Progressing     Problem: Knowledge Deficit  Goal: Patient/family/caregiver demonstrates understanding of disease process, treatment plan, medications, and discharge instructions  Description: Complete learning assessment and assess knowledge base    Interventions:  - Provide teaching at level of understanding  - Provide teaching via preferred learning methods  Outcome: Progressing

## 2021-03-08 ENCOUNTER — APPOINTMENT (INPATIENT)
Dept: ULTRASOUND IMAGING | Facility: HOSPITAL | Age: 46
DRG: 386 | End: 2021-03-08
Payer: COMMERCIAL

## 2021-03-08 LAB
CAMPYLOBACTER DNA SPEC NAA+PROBE: NORMAL
SALMONELLA DNA SPEC QL NAA+PROBE: NORMAL
SHIGA TOXIN STX GENE SPEC NAA+PROBE: NORMAL
SHIGELLA DNA SPEC QL NAA+PROBE: NORMAL

## 2021-03-08 PROCEDURE — 99232 SBSQ HOSP IP/OBS MODERATE 35: CPT | Performed by: PHYSICIAN ASSISTANT

## 2021-03-08 PROCEDURE — 76705 ECHO EXAM OF ABDOMEN: CPT

## 2021-03-08 RX ORDER — HYDROMORPHONE HCL/PF 1 MG/ML
0.5 SYRINGE (ML) INJECTION EVERY 4 HOURS PRN
Status: DISCONTINUED | OUTPATIENT
Start: 2021-03-08 | End: 2021-03-11

## 2021-03-08 RX ORDER — POLYETHYLENE GLYCOL 3350, SODIUM CHLORIDE, SODIUM BICARBONATE, POTASSIUM CHLORIDE 420; 11.2; 5.72; 1.48 G/4L; G/4L; G/4L; G/4L
4000 POWDER, FOR SOLUTION ORAL SEE ADMIN INSTRUCTIONS
Status: DISCONTINUED | OUTPATIENT
Start: 2021-03-08 | End: 2021-03-09

## 2021-03-08 RX ORDER — POLYETHYLENE GLYCOL 3350 17 G/17G
17 POWDER, FOR SOLUTION ORAL 2 TIMES DAILY
Status: DISCONTINUED | OUTPATIENT
Start: 2021-03-08 | End: 2021-03-12 | Stop reason: HOSPADM

## 2021-03-08 RX ORDER — DIPHENHYDRAMINE HYDROCHLORIDE 50 MG/ML
25 INJECTION INTRAMUSCULAR; INTRAVENOUS ONCE
Status: COMPLETED | OUTPATIENT
Start: 2021-03-08 | End: 2021-03-08

## 2021-03-08 RX ADMIN — DIPHENHYDRAMINE HYDROCHLORIDE 50 MG: 50 INJECTION, SOLUTION INTRAMUSCULAR; INTRAVENOUS at 09:07

## 2021-03-08 RX ADMIN — DIPHENHYDRAMINE HYDROCHLORIDE 25 MG: 50 INJECTION, SOLUTION INTRAMUSCULAR; INTRAVENOUS at 16:06

## 2021-03-08 RX ADMIN — MONTELUKAST 10 MG: 10 TABLET, FILM COATED ORAL at 20:50

## 2021-03-08 RX ADMIN — DIPHENHYDRAMINE HYDROCHLORIDE 50 MG: 50 INJECTION, SOLUTION INTRAMUSCULAR; INTRAVENOUS at 04:32

## 2021-03-08 RX ADMIN — METHYLPREDNISOLONE SODIUM SUCCINATE 40 MG: 40 INJECTION, POWDER, FOR SOLUTION INTRAMUSCULAR; INTRAVENOUS at 20:51

## 2021-03-08 RX ADMIN — Medication 1200 UNITS: at 09:07

## 2021-03-08 RX ADMIN — POLYETHYLENE GLYCOL 3350 17 G: 17 POWDER, FOR SOLUTION ORAL at 17:29

## 2021-03-08 RX ADMIN — DIPHENHYDRAMINE HYDROCHLORIDE 50 MG: 50 INJECTION, SOLUTION INTRAMUSCULAR; INTRAVENOUS at 13:15

## 2021-03-08 RX ADMIN — POLYETHYLENE GLYCOL 3350 17 G: 17 POWDER, FOR SOLUTION ORAL at 09:08

## 2021-03-08 RX ADMIN — LORAZEPAM 0.5 MG: 2 INJECTION INTRAMUSCULAR; INTRAVENOUS at 14:02

## 2021-03-08 RX ADMIN — LEVOTHYROXINE SODIUM 50 MCG: 0.05 TABLET ORAL at 05:47

## 2021-03-08 RX ADMIN — LORAZEPAM 0.5 MG: 2 INJECTION INTRAMUSCULAR; INTRAVENOUS at 04:32

## 2021-03-08 RX ADMIN — MONTELUKAST 10 MG: 10 TABLET, FILM COATED ORAL at 09:07

## 2021-03-08 RX ADMIN — HYDROMORPHONE HYDROCHLORIDE 0.5 MG: 1 INJECTION, SOLUTION INTRAMUSCULAR; INTRAVENOUS; SUBCUTANEOUS at 17:30

## 2021-03-08 RX ADMIN — LORAZEPAM 0.5 MG: 2 INJECTION INTRAMUSCULAR; INTRAVENOUS at 18:11

## 2021-03-08 RX ADMIN — LORAZEPAM 0.5 MG: 2 INJECTION INTRAMUSCULAR; INTRAVENOUS at 09:06

## 2021-03-08 RX ADMIN — HYDROMORPHONE HYDROCHLORIDE 0.5 MG: 1 INJECTION, SOLUTION INTRAMUSCULAR; INTRAVENOUS; SUBCUTANEOUS at 21:46

## 2021-03-08 RX ADMIN — METHYLPREDNISOLONE SODIUM SUCCINATE 40 MG: 40 INJECTION, POWDER, FOR SOLUTION INTRAMUSCULAR; INTRAVENOUS at 09:06

## 2021-03-08 RX ADMIN — SODIUM CHLORIDE 75 ML/HR: 0.9 INJECTION, SOLUTION INTRAVENOUS at 09:24

## 2021-03-08 RX ADMIN — POLYETHYLENE GLYCOL 3350, SODIUM CHLORIDE, SODIUM BICARBONATE AND POTASSIUM CHLORIDE WITH LEMON FLAVOR 4000 ML: 420; 11.2; 5.72; 1.48 POWDER, FOR SOLUTION ORAL at 19:33

## 2021-03-08 RX ADMIN — CETIRIZINE HYDROCHLORIDE 10 MG: 10 TABLET, FILM COATED ORAL at 09:08

## 2021-03-08 RX ADMIN — DIPHENHYDRAMINE HYDROCHLORIDE 50 MG: 50 INJECTION, SOLUTION INTRAMUSCULAR; INTRAVENOUS at 18:11

## 2021-03-08 RX ADMIN — HYDROXYZINE HYDROCHLORIDE 25 MG: 25 TABLET, FILM COATED ORAL at 20:50

## 2021-03-08 RX ADMIN — DIPHENHYDRAMINE HYDROCHLORIDE 50 MG: 50 INJECTION, SOLUTION INTRAMUSCULAR; INTRAVENOUS at 21:43

## 2021-03-08 RX ADMIN — HYDROMORPHONE HYDROCHLORIDE 0.5 MG: 1 INJECTION, SOLUTION INTRAMUSCULAR; INTRAVENOUS; SUBCUTANEOUS at 13:08

## 2021-03-08 RX ADMIN — LORAZEPAM 0.5 MG: 2 INJECTION INTRAMUSCULAR; INTRAVENOUS at 22:59

## 2021-03-08 RX ADMIN — SODIUM CHLORIDE 75 ML/HR: 0.9 INJECTION, SOLUTION INTRAVENOUS at 21:44

## 2021-03-08 RX ADMIN — HYDROMORPHONE HYDROCHLORIDE 0.5 MG: 1 INJECTION, SOLUTION INTRAMUSCULAR; INTRAVENOUS; SUBCUTANEOUS at 05:45

## 2021-03-08 NOTE — PROGRESS NOTES
Progress Note - Manish Christy 1975, 48 y o  female MRN: [id number]    Unit/Bed#: 407-01 Encounter: [id number]    Primary Care Provider: Landon Cam MD   Date and time admitted to hospital: 3/3/2021  1:26 PM        Diarrhea  Assessment & Plan  Patient admits to 15 diarrheal episodes over the past 24 hours prior to admission  History of IBD  Enteric panel, C diff, and other stool studies per GI ordered- specimen not collected given diarrhea has currently resolved  C diff therefore cancelled  Formed stool sample collected 3/7  Initially held off on antibiotics and steroids pending stool specimen needing to be collected  Continue IV steroids  GI consultation appreciated  Mast cell activation syndrome [facility])  Assessment & Plan  Patient is seen/managed by physician in [place]   -  Dr Crys Daly at [facility] - [phone]  Spoke with allergist on-call today who noted the patient's symptoms who recommended Steroid taper, no need for transfer  Due to ongoing symptoms and inability to discharge from hospital will continue with IV steroids 40 mg b i d  continue with IV Benadryl as needed   Avoid known triggers  * Abdominal pain  Assessment & Plan  Patient admits to right upper quadrant pain accompanied with nausea vomiting and 15 diarrheal episodes on the day prior to admission  Stool studies ordered - pending results  Formed stool collected 3/7/21  CT abdomen/pelvis: Bowel wall thickening of the descending colon extending to the rectum suggestive of colitis of an infectious/inflammatory etiology  2 6 cm fluid attenuation structure in the right adnexal region may represent an ovarian cyst in the absence of a history of oophorectomy  Transvaginal US: Limited visualization of a complex-appearing cystic structure in the right ovary measuring 2 6 cm  Recommend follow-up pelvic ultrasound in 6-12 weeks  3/7 - Ongoing pain, worse with food intake   Only tolerating clear liquid diet, but no further N/V  No diarrhea  Obstruction series - negative  Liver function testing and lipase - normal     US RUQ: Unremarkable right upper quadrant exam status post cholecystectomy  Patient to have colonoscopy tomorrow 3/9    Leukocytosis  Assessment & Plan  As evidenced by white blood cell count of 10 61 on admission, peaked at 13 40- now resolved  Patient with known mast cell activation and IBD utilizing home steroids  Trend CBC daily        Thrombocytosis (HCC)  Assessment & Plan  Platelet count of 291 on admission  Platelet count of 585 on 3/7  Trend CBC daily    Hypothyroidism (acquired)  Assessment & Plan  Patient reportedly not taking levothyroxine for last several weeks  Patient currently refusing this  Generalized anxiety disorder  Assessment & Plan  Patient is less anxious today  Continue Buspar, atarax, temazepam   Patient reports can only take her own home prescription pills-currently will provide Ativan 0 5 p r n  As needed for anxiety      Hypokalemia  Assessment & Plan  Potassium level 3 2 on admission  Resolved since receiving IV Potassium 40 mEq  Trend potassium q d  Replete as needed    Inflammatory bowel disease  Assessment & Plan  Uncertain if crohn's vs  UC based on GI notes  On IV Stelara infusions  Initially held IV steroids pending stool sample collection, need to rule out bacterial colitis, however, given discontinuation of diarrhea for several days, resolution of leukocytosis, and afebrile state, safe to start IV steroids even if stool samples still pending  Received IV solumedrol 60mg once on 3/7 in AM, then 40mg IV BID dosing- will continue this for now  Continue with clear liquid diet for now    Planned colonoscopy tomorrow 3/9      VTE Pharmacologic Prophylaxis:   Pharmacologic: Patient has refused VTE prophylaxis  Mechanical VTE Prophylaxis in Place: Yes    Patient Centered Rounds: I have performed bedside rounds with nursing staff today     Discussions with Specialists or Other Care Team Provider: nursing, CM, GI, attending    Education and Discussions with Family / Patient: patient     Time Spent for Care: 20 minutes  More than 50% of total time spent on counseling and coordination of care as described above  Current Length of Stay: 5 day(s)    Current Patient Status: Inpatient   Certification Statement: The patient will continue to require additional inpatient hospital stay due to continued need for IV steroids and workup with planned colonoscopy 3/9  Discharge Plan: TBD    Code Status: Level 1 - Full Code      Subjective: The patient was seen and examined  The patient states she continues to have abdominal pain  She feels she's constipated  Objective:     Vitals:   Temp (24hrs), Av 7 °F (37 1 °C), Min:98 4 °F (36 9 °C), Max:99 2 °F (37 3 °C)    Temp:  [98 4 °F (36 9 °C)-99 2 °F (37 3 °C)] 98 4 °F (36 9 °C)  HR:  [81-98] 98  Resp:  [16-18] 16  BP: ()/(61-70) 91/61  SpO2:  [97 %-98 %] 97 %  Body mass index is 23 99 kg/m²  Input and Output Summary (last 24 hours): Intake/Output Summary (Last 24 hours) at 3/8/2021 1249  Last data filed at 3/8/2021 1238  Gross per 24 hour   Intake 180 ml   Output --   Net 180 ml       Physical Exam:     Physical Exam  Vitals signs and nursing note reviewed  Constitutional:       General: She is awake  Appearance: Normal appearance  Cardiovascular:      Rate and Rhythm: Normal rate and regular rhythm  Pulmonary:      Effort: Pulmonary effort is normal       Breath sounds: Normal breath sounds  No wheezing, rhonchi or rales  Abdominal:      General: Bowel sounds are normal       Palpations: Abdomen is soft  Tenderness: There is abdominal tenderness in the right upper quadrant  There is no guarding or rebound  Skin:     General: Skin is warm and dry  Neurological:      General: No focal deficit present        Mental Status: She is alert and oriented to person, place, and time  Psychiatric:         Attention and Perception: Attention normal          Mood and Affect: Mood normal          Speech: Speech normal          Behavior: Behavior is cooperative  Additional Data:     Labs:    Results from last 7 days   Lab Units 03/07/21  0512   WBC Thousand/uL 7 85   HEMOGLOBIN g/dL 12 5   HEMATOCRIT % 39 5   PLATELETS Thousands/uL 439*   NEUTROS PCT % 47   LYMPHS PCT % 31   MONOS PCT % 11   EOS PCT % 9*     Results from last 7 days   Lab Units 03/07/21  0512   SODIUM mmol/L 139   POTASSIUM mmol/L 4 0   CHLORIDE mmol/L 106   CO2 mmol/L 27   BUN mg/dL 6   CREATININE mg/dL 0 81   ANION GAP mmol/L 6   CALCIUM mg/dL 8 2*   ALBUMIN g/dL 2 8*   TOTAL BILIRUBIN mg/dL 0 40   ALK PHOS U/L 43*   ALT U/L 34   AST U/L 17   GLUCOSE RANDOM mg/dL 80     Results from last 7 days   Lab Units 03/03/21  1415   INR  0 94             Results from last 7 days   Lab Units 03/03/21  1415   LACTIC ACID mmol/L 1 9           * I Have Reviewed All Lab Data Listed Above  * Additional Pertinent Lab Tests Reviewed:  All Labs Within Last 24 Hours Reviewed    Imaging:    Imaging Reports Reviewed Today Include: none  Imaging Personally Reviewed by Myself Includes:  none    Recent Cultures (last 7 days):           Last 24 Hours Medication List:   Current Facility-Administered Medications   Medication Dose Route Frequency Provider Last Rate    busPIRone  5 mg Oral Q24H JONATHAN Jim      calcium citrate  950 mg Oral Daily Thalia Cazares PA-C      cetirizine  10 mg Oral Daily Thalia Cazares PA-C      cholecalciferol  1,200 Units Oral Daily Thalia Cazares PA-C      cromolyn  200 mg Oral 4x Daily (AC & HS) Thalia Cazares PA-C      diclofenac  75 mg Oral BID Thalia Cazares PA-C      diphenhydrAMINE  50 mg Intravenous Q4H PRN Thalia Cazares PA-C      [START ON 3/10/2021] ergocalciferol  50,000 Units Oral Weekly Thalia Cazares PA-C      heparin (porcine)  5,000 Units Subcutaneous [facility] JONATHAN Guerra      HYDROmorphone  0 5 mg Intravenous Q4H PRN Lacey Keen PA-C      hydrOXYzine HCL  25 mg Oral Q6H PRN Lacey Keen PA-C      levothyroxine  50 mcg Oral Early Morning Lacey Keen PA-C      liothyronine  5 mcg Oral Daily Lacey Keen PA-C      LORazepam  0 5 mg Intravenous Q4H PRN JONATHAN Guerra      methylPREDNISolone sodium succinate  40 mg Intravenous Q12H [facility] Sameer Somers PA-C      montelukast  10 mg Oral BID Lacey Keen PA-C      ondansetron  4 mg Intravenous Q6H PRN Aarti Browne, CRVARUN      polyethylene glycol  17 g Oral BID Lacey Keen PA-C      sodium chloride  75 mL/hr Intravenous Continuous JONATHAN Guerra 75 mL/hr (03/08/21 0924)    temazepam  30 mg Oral HS PRN Lacey Keen PA-C      Vitamin B-2  400 mg Oral Daily Lacey Keen PA-C          Today, Patient Was Seen By: Lacey Keen PA-C    ** Please Note: Dictation voice to text software may have been used in the creation of this document   **

## 2021-03-08 NOTE — CASE MANAGEMENT
Continuing to follow pt  Pt continuing on IV Steroids; for colonoscopy on 3/9  Plans are home on dc with OP follow up after dc

## 2021-03-08 NOTE — ASSESSMENT & PLAN NOTE
Patient admits to 15 diarrheal episodes over the past 24 hours prior to admission  History of IBD  Enteric panel, C diff, and other stool studies per GI ordered- specimen not collected given diarrhea has currently resolved  C diff therefore cancelled  Formed stool sample collected 3/7  Initially held off on antibiotics and steroids pending stool specimen needing to be collected  Continue IV steroids  GI consultation appreciated

## 2021-03-08 NOTE — UTILIZATION REVIEW
Continued Stay Review    Date: 3/8/21                          Current Patient Class: inpatient  Current Level of Care: med surg  HPI:46 y o  female initially admitted on 3/3/21  Assessment/Plan:  Initially held IV steroids pending stool sample collection, need to rule out bacterial colitis, however, given discontinuation of diarrhea for several days, resolution of leukocytosis, and afebrile state, safe to start IV steroids even if stool samples still pending  Received IV solumedrol 60mg once in AM, and continue later this evening at 40mg IV BID dosing  May need inpatient GI reassessment and possible flexible sigmoidoscopy depending on clinical course  Continue with clear liquid diet for now    Pertinent Labs/Diagnostic Results:   Results from last 7 days   Lab Units 03/07/21  0512 03/06/21  0512 03/05/21  0554 03/04/21  0423 03/03/21  1415   WBC Thousand/uL 7 85 7 00 10 04 13 40* 10 61*   HEMOGLOBIN g/dL 12 5 12 7 12 3 12 5 13 7   HEMATOCRIT % 39 5 40 4 39 1 39 4 43 3   PLATELETS Thousands/uL 439* 454* 436* 488* 519*   NEUTROS ABS Thousands/µL 3 65 3 07 5 28  --  6 19     Results from last 7 days   Lab Units 03/07/21  0512 03/06/21  0512 03/05/21  0554 03/04/21  0424 03/04/21  0423 03/03/21  1415   SODIUM mmol/L 139 139 141  --  138 141   POTASSIUM mmol/L 4 0 4 2 3 8  --  4 3 3 2*   CHLORIDE mmol/L 106 106 106  --  104 106   CO2 mmol/L 27 27 26  --  24 29   ANION GAP mmol/L 6 6 9  --  10 6   BUN mg/dL 6 9 9  --  7 10   CREATININE mg/dL 0 81 0 76 0 74  --  0 73 0 84   EGFR ml/min/1 73sq m 86 93 96  --  98 82   CALCIUM mg/dL 8 2* 8 4 8 1*  --  8 9 8 6   MAGNESIUM mg/dL  --  2 2 1 8 1 9  --  1 8   PHOSPHORUS mg/dL  --  2 8 2 6* 2 7  --   --      Results from last 7 days   Lab Units 03/07/21  0512 03/06/21  1714 03/04/21 0423 03/03/21  1415   AST U/L 17 24 21 26   ALT U/L 34 40 45 51   ALK PHOS U/L 43* 46 51 51   TOTAL PROTEIN g/dL 6 4 6 4 7 1 7 0   ALBUMIN g/dL 2 8* 2 9* 3 2* 3 1*   TOTAL BILIRUBIN mg/dL 0 40 0 20 0  30 0 30   BILIRUBIN DIRECT mg/dL  --  0 04  --   --      Results from last 7 days   Lab Units 03/07/21  0512 03/06/21  0512 03/05/21  0554 03/04/21  0423 03/03/21  1415   GLUCOSE RANDOM mg/dL 80 74 90 117 130     Results from last 7 days   Lab Units 03/03/21  1415   TROPONIN I ng/mL <0 02     Results from last 7 days   Lab Units 03/03/21  1415   PROTIME seconds 12 4   INR  0 94   PTT seconds 25     Results from last 7 days   Lab Units 03/03/21  1415   LACTIC ACID mmol/L 1 9     Results from last 7 days   Lab Units 03/06/21  1714 03/03/21  1415   LIPASE u/L 76 81   AMYLASE IU/L 54  --      Results from last 7 days   Lab Units 03/03/21  1623   CRP mg/L <0 5         Results from last 7 days   Lab Units 03/03/21  1547   CLARITY UA  Clear   COLOR UA  Yellow   SPEC GRAV UA  1 015   PH UA  7 0   GLUCOSE UA mg/dl Negative   KETONES UA mg/dl Negative   BLOOD UA  Negative   PROTEIN UA mg/dl Negative   NITRITE UA  Negative   BILIRUBIN UA  Negative   UROBILINOGEN UA E U /dl 0 2   LEUKOCYTES UA  Negative     3/6  Obstruction series=  Nonobstructive bowel gas pattern  Minimal scarring or subsegmental atelectasis in the lingula  3/8  RUQ US=  Unremarkable right upper quadrant exam status post cholecystectomy      Vital Signs: BP 91/61 (BP Location: Right arm)   Pulse 98   Temp 98 4 °F (36 9 °C) (Oral)   Resp 16   Ht 5' 5 25" (1 657 m)   Wt 65 9 kg (145 lb 4 5 oz)   SpO2 97%   BMI 23 99 kg/m²     Medications:   busPIRone, 5 mg, Oral, Q24H  calcium citrate, 950 mg, Oral, Daily  cetirizine, 10 mg, Oral, Daily  cholecalciferol, 1,200 Units, Oral, Daily  cromolyn, 200 mg, Oral, 4x Daily (AC & HS)  diclofenac, 75 mg, Oral, BID  [START ON 3/10/2021] ergocalciferol, 50,000 Units, Oral, Weekly  heparin (porcine), 5,000 Units, Subcutaneous, Q8H KARISSA  levothyroxine, 50 mcg, Oral, Early Morning  liothyronine, 5 mcg, Oral, Daily  methylPREDNISolone sodium succinate, 40 mg, Intravenous, Q12H KARISSA  montelukast, 10 mg, Oral, BID  polyethylene glycol, 17 g, Oral, BID  Vitamin B-2, 400 mg, Oral, Daily    Continuous IV Infusions:  sodium chloride, 75 mL/hr, Intravenous, Continuous    PRN Meds:  diphenhydrAMINE, 50 mg, Intravenous, Q4H PRN  HYDROmorphone, 0 5 mg, Intravenous, Q4H PRN  hydrOXYzine HCL, 25 mg, Oral, Q6H PRN  LORazepam, 0 5 mg, Intravenous, Q4H PRN  ondansetron, 4 mg, Intravenous, Q6H PRN  temazepam, 30 mg, Oral, HS PRN    Discharge Plan: tbd    Network Utilization Review Department  ATTENTION: Please call with any questions or concerns to [phone] and carefully listen to the prompts so that you are directed to the right person  All voicemails are confidential   Herlinda Brown all requests for admission clinical reviews, approved or denied determinations and any other requests to dedicated fax number below belonging to the campus where the patient is receiving treatment   List of dedicated fax numbers for the Facilities:  [address] DENIALS (Administrative/Medical Necessity) [phone]   [address] (Maternity/NICU/Pediatrics) [phone]   [address] Monica Pineda 1849 ([place] Marley "Angella" 103) [id number] [place] [address] Molly Jesusita Ritchie [address] [place] [address] [place] [address] [phone]

## 2021-03-08 NOTE — ASSESSMENT & PLAN NOTE
Uncertain if crohn's vs  UC based on GI notes  On IV Stelara infusions  Initially held IV steroids pending stool sample collection, need to rule out bacterial colitis, however, given discontinuation of diarrhea for several days, resolution of leukocytosis, and afebrile state, safe to start IV steroids even if stool samples still pending  Received IV solumedrol 60mg once on 3/7 in AM, then 40mg IV BID dosing- will continue this for now  Continue with clear liquid diet for now    Planned colonoscopy tomorrow 3/9

## 2021-03-08 NOTE — ASSESSMENT & PLAN NOTE
Patient admits to right upper quadrant pain accompanied with nausea vomiting and 15 diarrheal episodes on the day prior to admission  Stool studies ordered - pending results  Formed stool collected 3/7/21  CT abdomen/pelvis: Bowel wall thickening of the descending colon extending to the rectum suggestive of colitis of an infectious/inflammatory etiology  2 6 cm fluid attenuation structure in the right adnexal region may represent an ovarian cyst in the absence of a history of oophorectomy  Transvaginal US: Limited visualization of a complex-appearing cystic structure in the right ovary measuring 2 6 cm  Recommend follow-up pelvic ultrasound in 6-12 weeks  3/7 - Ongoing pain, worse with food intake  Only tolerating clear liquid diet, but no further N/V  No diarrhea  Obstruction series - negative  Liver function testing and lipase - normal     US RUQ: Unremarkable right upper quadrant exam status post cholecystectomy      Patient to have colonoscopy tomorrow 3/9

## 2021-03-08 NOTE — ASSESSMENT & PLAN NOTE
Patient is seen/managed by physician in [place]   -  Dr Ava Leggett at [facility] [place] - [phone]  Spoke with allergist on-call today who noted the patient's symptoms who recommended Steroid taper, no need for transfer  Due to ongoing symptoms and inability to discharge from hospital will continue with IV steroids 40 mg b i d  continue with IV Benadryl as needed   Avoid known triggers

## 2021-03-09 ENCOUNTER — APPOINTMENT (INPATIENT)
Dept: PERIOP | Facility: HOSPITAL | Age: 46
DRG: 386 | End: 2021-03-09
Attending: INTERNAL MEDICINE
Payer: COMMERCIAL

## 2021-03-09 ENCOUNTER — ANESTHESIA EVENT (INPATIENT)
Dept: PERIOP | Facility: HOSPITAL | Age: 46
DRG: 386 | End: 2021-03-09
Payer: COMMERCIAL

## 2021-03-09 ENCOUNTER — ANESTHESIA (INPATIENT)
Dept: PERIOP | Facility: HOSPITAL | Age: 46
DRG: 386 | End: 2021-03-09
Payer: COMMERCIAL

## 2021-03-09 LAB
ANION GAP SERPL CALCULATED.3IONS-SCNC: 10 MMOL/L (ref 4–13)
BASOPHILS # BLD AUTO: 0.02 THOUSANDS/ΜL (ref 0–0.1)
BASOPHILS NFR BLD AUTO: 0 % (ref 0–1)
BUN SERPL-MCNC: 6 MG/DL (ref 5–25)
CALCIUM SERPL-MCNC: 8.6 MG/DL (ref 8.3–10.1)
CHLORIDE SERPL-SCNC: 107 MMOL/L (ref 100–108)
CO2 SERPL-SCNC: 24 MMOL/L (ref 21–32)
CREAT SERPL-MCNC: 0.71 MG/DL (ref 0.6–1.3)
EOSINOPHIL # BLD AUTO: 0 THOUSAND/ΜL (ref 0–0.61)
EOSINOPHIL NFR BLD AUTO: 0 % (ref 0–6)
ERYTHROCYTE [DISTWIDTH] IN BLOOD BY AUTOMATED COUNT: 13.9 % (ref 11.6–15.1)
GFR SERPL CREATININE-BSD FRML MDRD: 101 ML/MIN/1.73SQ M
GLUCOSE SERPL-MCNC: 121 MG/DL (ref 65–140)
HCT VFR BLD AUTO: 38.4 % (ref 34.8–46.1)
HGB BLD-MCNC: 12.2 G/DL (ref 11.5–15.4)
IMM GRANULOCYTES # BLD AUTO: 0.07 THOUSAND/UL (ref 0–0.2)
IMM GRANULOCYTES NFR BLD AUTO: 1 % (ref 0–2)
LYMPHOCYTES # BLD AUTO: 1.21 THOUSANDS/ΜL (ref 0.6–4.47)
LYMPHOCYTES NFR BLD AUTO: 9 % (ref 14–44)
MCH RBC QN AUTO: 30.7 PG (ref 26.8–34.3)
MCHC RBC AUTO-ENTMCNC: 31.8 G/DL (ref 31.4–37.4)
MCV RBC AUTO: 97 FL (ref 82–98)
MONOCYTES # BLD AUTO: 0.74 THOUSAND/ΜL (ref 0.17–1.22)
MONOCYTES NFR BLD AUTO: 6 % (ref 4–12)
NEUTROPHILS # BLD AUTO: 10.97 THOUSANDS/ΜL (ref 1.85–7.62)
NEUTS SEG NFR BLD AUTO: 84 % (ref 43–75)
NRBC BLD AUTO-RTO: 0 /100 WBCS
PLATELET # BLD AUTO: 374 THOUSANDS/UL (ref 149–390)
PMV BLD AUTO: 10.3 FL (ref 8.9–12.7)
POTASSIUM SERPL-SCNC: 3.7 MMOL/L (ref 3.5–5.3)
RBC # BLD AUTO: 3.97 MILLION/UL (ref 3.81–5.12)
SODIUM SERPL-SCNC: 141 MMOL/L (ref 136–145)
WBC # BLD AUTO: 13.01 THOUSAND/UL (ref 4.31–10.16)

## 2021-03-09 PROCEDURE — 43239 EGD BIOPSY SINGLE/MULTIPLE: CPT | Performed by: INTERNAL MEDICINE

## 2021-03-09 PROCEDURE — 80048 BASIC METABOLIC PNL TOTAL CA: CPT | Performed by: PHYSICIAN ASSISTANT

## 2021-03-09 PROCEDURE — 0DB68ZX EXCISION OF STOMACH, VIA NATURAL OR ARTIFICIAL OPENING ENDOSCOPIC, DIAGNOSTIC: ICD-10-PCS | Performed by: INTERNAL MEDICINE

## 2021-03-09 PROCEDURE — 0DBE8ZX EXCISION OF LARGE INTESTINE, VIA NATURAL OR ARTIFICIAL OPENING ENDOSCOPIC, DIAGNOSTIC: ICD-10-PCS | Performed by: INTERNAL MEDICINE

## 2021-03-09 PROCEDURE — 99232 SBSQ HOSP IP/OBS MODERATE 35: CPT | Performed by: PHYSICIAN ASSISTANT

## 2021-03-09 PROCEDURE — 0DBN8ZX EXCISION OF SIGMOID COLON, VIA NATURAL OR ARTIFICIAL OPENING ENDOSCOPIC, DIAGNOSTIC: ICD-10-PCS | Performed by: INTERNAL MEDICINE

## 2021-03-09 PROCEDURE — 0DB98ZX EXCISION OF DUODENUM, VIA NATURAL OR ARTIFICIAL OPENING ENDOSCOPIC, DIAGNOSTIC: ICD-10-PCS | Performed by: INTERNAL MEDICINE

## 2021-03-09 PROCEDURE — 88305 TISSUE EXAM BY PATHOLOGIST: CPT | Performed by: PATHOLOGY

## 2021-03-09 PROCEDURE — 85025 COMPLETE CBC W/AUTO DIFF WBC: CPT | Performed by: PHYSICIAN ASSISTANT

## 2021-03-09 PROCEDURE — 45380 COLONOSCOPY AND BIOPSY: CPT | Performed by: INTERNAL MEDICINE

## 2021-03-09 PROCEDURE — 0DBH8ZX EXCISION OF CECUM, VIA NATURAL OR ARTIFICIAL OPENING ENDOSCOPIC, DIAGNOSTIC: ICD-10-PCS | Performed by: INTERNAL MEDICINE

## 2021-03-09 RX ORDER — LIDOCAINE HYDROCHLORIDE 10 MG/ML
INJECTION, SOLUTION EPIDURAL; INFILTRATION; INTRACAUDAL; PERINEURAL AS NEEDED
Status: DISCONTINUED | OUTPATIENT
Start: 2021-03-09 | End: 2021-03-09

## 2021-03-09 RX ORDER — ONDANSETRON 2 MG/ML
4 INJECTION INTRAMUSCULAR; INTRAVENOUS ONCE AS NEEDED
Status: DISCONTINUED | OUTPATIENT
Start: 2021-03-09 | End: 2021-03-09

## 2021-03-09 RX ORDER — METHYLPREDNISOLONE SODIUM SUCCINATE 40 MG/ML
40 INJECTION, POWDER, LYOPHILIZED, FOR SOLUTION INTRAMUSCULAR; INTRAVENOUS ONCE
Status: COMPLETED | OUTPATIENT
Start: 2021-03-09 | End: 2021-03-09

## 2021-03-09 RX ORDER — METHYLPREDNISOLONE SODIUM SUCCINATE 40 MG/ML
40 INJECTION, POWDER, LYOPHILIZED, FOR SOLUTION INTRAMUSCULAR; INTRAVENOUS ONCE
Status: DISCONTINUED | OUTPATIENT
Start: 2021-03-09 | End: 2021-03-09

## 2021-03-09 RX ORDER — DIPHENHYDRAMINE HYDROCHLORIDE 50 MG/ML
50 INJECTION INTRAMUSCULAR; INTRAVENOUS ONCE
Status: COMPLETED | OUTPATIENT
Start: 2021-03-09 | End: 2021-03-09

## 2021-03-09 RX ORDER — DIPHENHYDRAMINE HYDROCHLORIDE 50 MG/ML
50 INJECTION INTRAMUSCULAR; INTRAVENOUS ONCE
Status: DISCONTINUED | OUTPATIENT
Start: 2021-03-09 | End: 2021-03-09

## 2021-03-09 RX ORDER — PROPOFOL 10 MG/ML
INJECTION, EMULSION INTRAVENOUS AS NEEDED
Status: DISCONTINUED | OUTPATIENT
Start: 2021-03-09 | End: 2021-03-09

## 2021-03-09 RX ADMIN — PROPOFOL 20 MG: 10 INJECTION, EMULSION INTRAVENOUS at 12:27

## 2021-03-09 RX ADMIN — LORAZEPAM 0.5 MG: 2 INJECTION INTRAMUSCULAR; INTRAVENOUS at 14:10

## 2021-03-09 RX ADMIN — METHYLPREDNISOLONE SODIUM SUCCINATE 40 MG: 40 INJECTION, POWDER, FOR SOLUTION INTRAMUSCULAR; INTRAVENOUS at 10:25

## 2021-03-09 RX ADMIN — DIPHENHYDRAMINE HYDROCHLORIDE 50 MG: 50 INJECTION, SOLUTION INTRAMUSCULAR; INTRAVENOUS at 06:14

## 2021-03-09 RX ADMIN — PROPOFOL 50 MG: 10 INJECTION, EMULSION INTRAVENOUS at 12:47

## 2021-03-09 RX ADMIN — PROPOFOL 30 MG: 10 INJECTION, EMULSION INTRAVENOUS at 12:50

## 2021-03-09 RX ADMIN — HYDROMORPHONE HYDROCHLORIDE 0.5 MG: 1 INJECTION, SOLUTION INTRAMUSCULAR; INTRAVENOUS; SUBCUTANEOUS at 20:56

## 2021-03-09 RX ADMIN — MONTELUKAST 10 MG: 10 TABLET, FILM COATED ORAL at 20:57

## 2021-03-09 RX ADMIN — PROPOFOL 20 MG: 10 INJECTION, EMULSION INTRAVENOUS at 12:31

## 2021-03-09 RX ADMIN — DIPHENHYDRAMINE HYDROCHLORIDE 25 MG: 50 INJECTION, SOLUTION INTRAMUSCULAR; INTRAVENOUS at 12:19

## 2021-03-09 RX ADMIN — POLYETHYLENE GLYCOL 3350 17 G: 17 POWDER, FOR SOLUTION ORAL at 18:01

## 2021-03-09 RX ADMIN — HYDROXYZINE HYDROCHLORIDE 25 MG: 25 TABLET, FILM COATED ORAL at 08:15

## 2021-03-09 RX ADMIN — HYDROMORPHONE HYDROCHLORIDE 0.5 MG: 1 INJECTION, SOLUTION INTRAMUSCULAR; INTRAVENOUS; SUBCUTANEOUS at 01:55

## 2021-03-09 RX ADMIN — PROPOFOL 20 MG: 10 INJECTION, EMULSION INTRAVENOUS at 12:45

## 2021-03-09 RX ADMIN — PROPOFOL 100 MG: 10 INJECTION, EMULSION INTRAVENOUS at 12:18

## 2021-03-09 RX ADMIN — DIPHENHYDRAMINE HYDROCHLORIDE 50 MG: 50 INJECTION, SOLUTION INTRAMUSCULAR; INTRAVENOUS at 01:54

## 2021-03-09 RX ADMIN — DIPHENHYDRAMINE HYDROCHLORIDE 50 MG: 50 INJECTION, SOLUTION INTRAMUSCULAR; INTRAVENOUS at 20:57

## 2021-03-09 RX ADMIN — CETIRIZINE HYDROCHLORIDE 10 MG: 10 TABLET, FILM COATED ORAL at 08:08

## 2021-03-09 RX ADMIN — PROPOFOL 20 MG: 10 INJECTION, EMULSION INTRAVENOUS at 12:36

## 2021-03-09 RX ADMIN — PROPOFOL 20 MG: 10 INJECTION, EMULSION INTRAVENOUS at 12:32

## 2021-03-09 RX ADMIN — PROPOFOL 20 MG: 10 INJECTION, EMULSION INTRAVENOUS at 12:40

## 2021-03-09 RX ADMIN — HYDROMORPHONE HYDROCHLORIDE 0.5 MG: 1 INJECTION, SOLUTION INTRAMUSCULAR; INTRAVENOUS; SUBCUTANEOUS at 10:23

## 2021-03-09 RX ADMIN — DIPHENHYDRAMINE HYDROCHLORIDE 50 MG: 50 INJECTION, SOLUTION INTRAMUSCULAR; INTRAVENOUS at 16:38

## 2021-03-09 RX ADMIN — LORAZEPAM 0.5 MG: 2 INJECTION INTRAMUSCULAR; INTRAVENOUS at 20:56

## 2021-03-09 RX ADMIN — PROPOFOL 20 MG: 10 INJECTION, EMULSION INTRAVENOUS at 12:28

## 2021-03-09 RX ADMIN — PROPOFOL 20 MG: 10 INJECTION, EMULSION INTRAVENOUS at 12:33

## 2021-03-09 RX ADMIN — DIPHENHYDRAMINE HYDROCHLORIDE 50 MG: 50 INJECTION, SOLUTION INTRAMUSCULAR; INTRAVENOUS at 10:25

## 2021-03-09 RX ADMIN — PROPOFOL 20 MG: 10 INJECTION, EMULSION INTRAVENOUS at 12:42

## 2021-03-09 RX ADMIN — SODIUM CHLORIDE 75 ML/HR: 0.9 INJECTION, SOLUTION INTRAVENOUS at 13:01

## 2021-03-09 RX ADMIN — LORAZEPAM 0.5 MG: 2 INJECTION INTRAMUSCULAR; INTRAVENOUS at 08:17

## 2021-03-09 RX ADMIN — PROPOFOL 20 MG: 10 INJECTION, EMULSION INTRAVENOUS at 12:30

## 2021-03-09 RX ADMIN — LORAZEPAM 0.5 MG: 2 INJECTION INTRAMUSCULAR; INTRAVENOUS at 03:52

## 2021-03-09 RX ADMIN — PROPOFOL 30 MG: 10 INJECTION, EMULSION INTRAVENOUS at 12:25

## 2021-03-09 RX ADMIN — PROPOFOL 20 MG: 10 INJECTION, EMULSION INTRAVENOUS at 12:38

## 2021-03-09 RX ADMIN — PROPOFOL 20 MG: 10 INJECTION, EMULSION INTRAVENOUS at 12:44

## 2021-03-09 RX ADMIN — PROPOFOL 50 MG: 10 INJECTION, EMULSION INTRAVENOUS at 12:22

## 2021-03-09 RX ADMIN — HYDROMORPHONE HYDROCHLORIDE 0.5 MG: 1 INJECTION, SOLUTION INTRAMUSCULAR; INTRAVENOUS; SUBCUTANEOUS at 16:38

## 2021-03-09 RX ADMIN — LIDOCAINE HYDROCHLORIDE 25 MG: 10 INJECTION, SOLUTION EPIDURAL; INFILTRATION; INTRACAUDAL; PERINEURAL at 12:17

## 2021-03-09 RX ADMIN — METHYLPREDNISOLONE SODIUM SUCCINATE 40 MG: 40 INJECTION, POWDER, FOR SOLUTION INTRAMUSCULAR; INTRAVENOUS at 09:13

## 2021-03-09 RX ADMIN — DIPHENHYDRAMINE HYDROCHLORIDE 25 MG: 50 INJECTION, SOLUTION INTRAMUSCULAR; INTRAVENOUS at 11:59

## 2021-03-09 RX ADMIN — MONTELUKAST 10 MG: 10 TABLET, FILM COATED ORAL at 08:07

## 2021-03-09 RX ADMIN — HYDROMORPHONE HYDROCHLORIDE 0.5 MG: 1 INJECTION, SOLUTION INTRAMUSCULAR; INTRAVENOUS; SUBCUTANEOUS at 06:14

## 2021-03-09 RX ADMIN — PROPOFOL 20 MG: 10 INJECTION, EMULSION INTRAVENOUS at 12:29

## 2021-03-09 RX ADMIN — METHYLPREDNISOLONE SODIUM SUCCINATE 40 MG: 40 INJECTION, POWDER, FOR SOLUTION INTRAMUSCULAR; INTRAVENOUS at 20:56

## 2021-03-09 RX ADMIN — PROPOFOL 20 MG: 10 INJECTION, EMULSION INTRAVENOUS at 12:34

## 2021-03-09 NOTE — ASSESSMENT & PLAN NOTE
Patient admits to right upper quadrant pain accompanied with nausea vomiting and 15 diarrheal episodes on the day prior to admission  Stool studies ordered  Formed stool collected 3/7/21  Stool Enteric Bacterial Panel- negative   Fecal calprotectin- pending   Ova and parasite- pending   Fecal leukocytes- pending    CT abdomen/pelvis: Bowel wall thickening of the descending colon extending to the rectum suggestive of colitis of an infectious/inflammatory etiology  2 6 cm fluid attenuation structure in the right adnexal region may represent an ovarian cyst in the absence of a history of oophorectomy  Transvaginal US: Limited visualization of a complex-appearing cystic structure in the right ovary measuring 2 6 cm  Recommend follow-up pelvic ultrasound in 6-12 weeks  3/7 - Ongoing pain, worse with food intake  Only tolerating clear liquid diet, but no further N/V  No diarrhea  Obstruction series - negative  Liver function testing and lipase - normal     US RUQ: Unremarkable right upper quadrant exam status post cholecystectomy      Patient to have colonoscopy today 3/9

## 2021-03-09 NOTE — ASSESSMENT & PLAN NOTE
Patient admits to 15 diarrheal episodes over the past 24 hours prior to admission  History of IBD  Enteric panel, C diff, and other stool studies per GI ordered- specimen not collected on admission given diarrhea had resolved  C diff therefore cancelled  Formed stool sample collected 3/7  Initially held off on antibiotics and steroids pending stool specimen needing to be collected  Stool Enteric Bacterial Panel- negative  Fecal calprotectin, Ova and parasite, and Fecal leukocytes- pending  Continue IV steroids  GI consultation appreciated

## 2021-03-09 NOTE — PLAN OF CARE
Problem: Potential for Falls  Goal: Patient will remain free of falls  Description: INTERVENTIONS:  - Assess patient frequently for physical needs  -  Identify cognitive and physical deficits and behaviors that affect risk of falls    -  [place] fall precautions as indicated by assessment   - Educate patient/family on patient safety including physical limitations  - Instruct patient to call for assistance with activity based on assessment  - Modify environment to reduce risk of injury  - Consider OT/PT consult to assist with strengthening/mobility  Outcome: Progressing     Problem: GASTROINTESTINAL - ADULT  Goal: Minimal or absence of nausea and/or vomiting  Description: INTERVENTIONS:  - Administer IV fluids if ordered to ensure adequate hydration  - Maintain NPO status until nausea and vomiting are resolved  - Nasogastric tube if ordered  - Administer ordered antiemetic medications as needed  - Provide nonpharmacologic comfort measures as appropriate  - Advance diet as tolerated, if ordered  - Consider nutrition services referral to assist patient with adequate nutrition and appropriate food choices  Outcome: Progressing  Goal: Maintains or returns to baseline bowel function  Description: INTERVENTIONS:  - Assess bowel function  - Encourage oral fluids to ensure adequate hydration  - Administer IV fluids if ordered to ensure adequate hydration  - Administer ordered medications as needed  - Encourage mobilization and activity  - Consider nutritional services referral to assist patient with adequate nutrition and appropriate food choices  Outcome: Progressing  Goal: Maintains adequate nutritional intake  Description: INTERVENTIONS:  - Monitor percentage of each meal consumed  - Identify factors contributing to decreased intake, treat as appropriate  - Assist with meals as needed  - Monitor I&O, weight, and lab values if indicated  - Obtain nutrition services referral as needed  Outcome: Progressing     Problem: METABOLIC, FLUID AND ELECTROLYTES - ADULT  Goal: Electrolytes maintained within normal limits  Description: INTERVENTIONS:  - Monitor labs and assess patient for signs and symptoms of electrolyte imbalances  - Administer electrolyte replacement as ordered  - Monitor response to electrolyte replacements, including repeat lab results as appropriate  - Instruct patient on fluid and nutrition as appropriate  Outcome: Progressing     Problem: PAIN - ADULT  Goal: Verbalizes/displays adequate comfort level or baseline comfort level  Description: Interventions:  - Encourage patient to monitor pain and request assistance  - Assess pain using appropriate pain scale (0-10 pain scale)  - Administer analgesics based on type and severity of pain and evaluate response  - Implement non-pharmacological measures as appropriate and evaluate response  - Consider cultural and social influences on pain and pain management  - Notify physician/advanced practitioner if interventions unsuccessful or patient reports new pain  Outcome: Progressing     Problem: INFECTION - ADULT  Goal: Absence or prevention of progression during hospitalization  Description: INTERVENTIONS:  - Assess and monitor for signs and symptoms of infection  - Monitor lab/diagnostic results  - Monitor all insertion sites, i e  indwelling lines, tubes, and drains  - Monitor endotracheal if appropriate and nasal secretions for changes in amount and color  - [place] appropriate cooling/warming therapies per order  - Administer medications as ordered  - Instruct and encourage patient and family to use good hand hygiene technique  - Identify and instruct in appropriate isolation precautions for identified infection/condition  Outcome: Progressing     Problem: SAFETY ADULT  Goal: Maintain or return to baseline ADL function  Description: INTERVENTIONS:  -  Assess patient's ability to carry out ADLs; assess patient's baseline for ADL function and identify physical deficits which impact ability to perform ADLs (bathing, care of mouth/teeth, toileting, grooming, dressing, etc )  - Assess/evaluate cause of self-care deficits   - Assess range of motion  - Assess patient's mobility; develop plan if impaired  - Assess patient's need for assistive devices and provide as appropriate  - Encourage maximum independence but intervene and supervise when necessary  - Involve family in performance of ADLs  - Assess for home care needs following discharge   - Consider OT consult to assist with ADL evaluation and planning for discharge  - Provide patient education as appropriate  Outcome: Progressing  Goal: Maintain or return mobility status to optimal level  Description: INTERVENTIONS:  - Assess patient's baseline mobility status (ambulation, transfers, stairs, etc )    - Identify cognitive and physical deficits and behaviors that affect mobility  - Identify mobility aids required to assist with transfers and/or ambulation (gait belt, sit-to-stand, lift, walker, cane, etc )  - [place] fall precautions as indicated by assessment  - Record patient progress and toleration of activity level on Mobility SBAR; progress patient to next Phase/Stage  - Instruct patient to call for assistance with activity based on assessment  - Consider rehabilitation consult to assist with strengthening/weightbearing, etc   Outcome: Progressing     Problem: DISCHARGE PLANNING  Goal: Discharge to home or other facility with appropriate resources  Description: INTERVENTIONS:  - Identify barriers to discharge w/patient and caregiver  - Arrange for needed discharge resources and transportation as appropriate  - Identify discharge learning needs (meds, wound care, etc )  - Arrange for interpretive services to assist at discharge as needed  - Refer to Case Management Department for coordinating discharge planning if the patient needs post-hospital services based on physician/advanced practitioner order or complex needs related to functional status, cognitive ability, or social support system  Outcome: Progressing     Problem: Knowledge Deficit  Goal: Patient/family/caregiver demonstrates understanding of disease process, treatment plan, medications, and discharge instructions  Description: Complete learning assessment and assess knowledge base    Interventions:  - Provide teaching at level of understanding  - Provide teaching via preferred learning methods  Outcome: Progressing

## 2021-03-09 NOTE — PROGRESS NOTES
[address]  Progress Note - Manish Christy 1975, 48 y o  female MRN: [id number]  Unit/Bed#: 425-01 Encounter: [id number]  Primary Care Provider: Landon Cam MD   Date and time admitted to hospital: 3/3/2021  1:26 PM    Diarrhea  Assessment & Plan  Patient admits to 15 diarrheal episodes over the past 24 hours prior to admission  History of IBD  Enteric panel, C diff, and other stool studies per GI ordered- specimen not collected on admission given diarrhea had resolved  C diff therefore cancelled  Formed stool sample collected 3/7  Initially held off on antibiotics and steroids pending stool specimen needing to be collected  Stool Enteric Bacterial Panel- negative  Fecal calprotectin, Ova and parasite, and Fecal leukocytes- pending  Continue IV steroids  GI consultation appreciated  Mast cell activation syndrome [facility])  Assessment & Plan  Patient is seen/managed by physician in [facility]   -  Dr Crys Daly at [facility] - [phone]  Spoke with allergist on-call today who noted the patient's symptoms who recommended Steroid taper, no need for transfer  Due to ongoing symptoms and inability to discharge from hospital will continue with IV steroids 40 mg b i d  continue with IV Benadryl as needed   Avoid known triggers  * Abdominal pain  Assessment & Plan  Patient admits to right upper quadrant pain accompanied with nausea vomiting and 15 diarrheal episodes on the day prior to admission  Stool studies ordered  Formed stool collected 3/7/21  Stool Enteric Bacterial Panel- negative   Fecal calprotectin- pending   Ova and parasite- pending   Fecal leukocytes- pending    CT abdomen/pelvis: Bowel wall thickening of the descending colon extending to the rectum suggestive of colitis of an infectious/inflammatory etiology    2 6 cm fluid attenuation structure in the right adnexal region may represent an ovarian cyst in the absence of a history of oophorectomy  Transvaginal US: Limited visualization of a complex-appearing cystic structure in the right ovary measuring 2 6 cm  Recommend follow-up pelvic ultrasound in 6-12 weeks  3/7 - Ongoing pain, worse with food intake  Only tolerating clear liquid diet, but no further N/V  No diarrhea  Obstruction series - negative  Liver function testing and lipase - normal     US RUQ: Unremarkable right upper quadrant exam status post cholecystectomy  Patient to have colonoscopy today 3/9    Leukocytosis  Assessment & Plan  As evidenced by white blood cell count of 10 61 on admission, peaked at 13 40- now resolved  Patient with known mast cell activation and IBD utilizing home steroids  Trend CBC daily        Thrombocytosis (HCC)  Assessment & Plan  Resolved- platelet count of 432 today  Platelet count of 287 on admission  Trend CBC daily    Hypothyroidism (acquired)  Assessment & Plan  Patient reportedly not taking levothyroxine for last several weeks  Patient currently refusing this  Generalized anxiety disorder  Assessment & Plan  Patient is less anxious today  Continue Buspar, atarax, temazepam   Patient reports can only take her own home prescription pills-currently will provide Ativan 0 5 p r n  As needed for anxiety      Hypokalemia  Assessment & Plan  Potassium level 3 2 on admission  Resolved since receiving IV Potassium 40 mEq  Trend potassium q d  Replete as needed    Inflammatory bowel disease  Assessment & Plan  Uncertain if crohn's vs  UC based on GI notes  On IV Stelara infusions  Initially held IV steroids pending stool sample collection, need to rule out bacterial colitis, however, given discontinuation of diarrhea for several days, resolution of leukocytosis, and afebrile state, patient was started on IV steroids  Received IV solumedrol 60mg once on 3/7 in AM, then 40mg IV BID dosing- will continue this for now  Continue with clear liquid diet for now    Planned colonoscopy today 3/9      VTE Pharmacologic Prophylaxis:   Pharmacologic: Patient has refused VTE prophylaxis  Mechanical VTE Prophylaxis in Place: Yes    Patient Centered Rounds: I have performed bedside rounds with nursing staff today  Discussions with Specialists or Other Care Team Provider: nursing, CM    Education and Discussions with Family / Patient: patient    Time Spent for Care: 20 minutes  More than 50% of total time spent on counseling and coordination of care as described above  Current Length of Stay: 6 day(s)    Current Patient Status: Inpatient   Certification Statement: The patient will continue to require additional inpatient hospital stay due to continued need for IV steroids, planned colonoscopy today, re-eval by GI    Discharge Plan: TBD    Code Status: Level 1 - Full Code      Subjective: The patient was seen and examined  The patient continues to have abdominal pain  Objective:     Vitals:   Temp (24hrs), Av 7 °F (37 1 °C), Min:98 1 °F (36 7 °C), Max:99 4 °F (37 4 °C)    Temp:  [98 1 °F (36 7 °C)-99 4 °F (37 4 °C)] 98 6 °F (37 °C)  HR:  [72] 72  Resp:  [16-19] 16  BP: (102-126)/(56-83) 102/56  SpO2:  [100 %] 100 %  Body mass index is 23 99 kg/m²  Input and Output Summary (last 24 hours): Intake/Output Summary (Last 24 hours) at 3/9/2021 1129  Last data filed at 3/8/2021 2144  Gross per 24 hour   Intake 1180 ml   Output --   Net 1180 ml       Physical Exam:     Physical Exam  Vitals signs and nursing note reviewed  Constitutional:       General: She is awake  Cardiovascular:      Rate and Rhythm: Normal rate and regular rhythm  Pulmonary:      Effort: Pulmonary effort is normal       Breath sounds: Normal breath sounds  No wheezing, rhonchi or rales  Abdominal:      General: Bowel sounds are normal       Palpations: Abdomen is soft  Tenderness: There is abdominal tenderness in the right upper quadrant  There is no guarding or rebound     Skin:     General: Skin is warm and dry  Neurological:      General: No focal deficit present  Mental Status: She is alert and oriented to person, place, and time  Psychiatric:         Attention and Perception: Attention normal          Mood and Affect: Mood normal          Speech: Speech normal          Behavior: Behavior is cooperative  Additional Data:     Labs:    Results from last 7 days   Lab Units 03/09/21  0625   WBC Thousand/uL 13 01*   HEMOGLOBIN g/dL 12 2   HEMATOCRIT % 38 4   PLATELETS Thousands/uL 374   NEUTROS PCT % 84*   LYMPHS PCT % 9*   MONOS PCT % 6   EOS PCT % 0     Results from last 7 days   Lab Units 03/09/21  0625 03/07/21  0512   SODIUM mmol/L 141 139   POTASSIUM mmol/L 3 7 4 0   CHLORIDE mmol/L 107 106   CO2 mmol/L 24 27   BUN mg/dL 6 6   CREATININE mg/dL 0 71 0 81   ANION GAP mmol/L 10 6   CALCIUM mg/dL 8 6 8 2*   ALBUMIN g/dL  --  2 8*   TOTAL BILIRUBIN mg/dL  --  0 40   ALK PHOS U/L  --  43*   ALT U/L  --  34   AST U/L  --  17   GLUCOSE RANDOM mg/dL 121 80     Results from last 7 days   Lab Units 03/03/21  1415   INR  0 94             Results from last 7 days   Lab Units 03/03/21  1415   LACTIC ACID mmol/L 1 9           * I Have Reviewed All Lab Data Listed Above  * Additional Pertinent Lab Tests Reviewed:  All Labs Within Last 24 Hours Reviewed    Imaging:    Imaging Reports Reviewed Today Include: none  Imaging Personally Reviewed by Myself Includes:  none    Recent Cultures (last 7 days):           Last 24 Hours Medication List:   Current Facility-Administered Medications   Medication Dose Route Frequency Provider Last Rate    busPIRone  5 mg Oral Q24H Doreene Nidia, PA-C      calcium citrate  950 mg Oral Daily Doreene Nidia, PA-C      cetirizine  10 mg Oral Daily Doreene Nidia, PA-C      cholecalciferol  1,200 Units Oral Daily Doreene Nidia, PA-C      cromolyn  200 mg Oral 4x Daily (AC & HS) Doreene Nidia, PA-C      diclofenac  75 mg Oral BID Doreene Nidia, PA-C      diphenhydrAMINE 50 mg Intravenous Q4H PRN Serge Gutiérrez PA-C      [START ON 3/10/2021] ergocalciferol  50,000 Units Oral Weekly Serge Gutiérrez PA-C      heparin (porcine)  5,000 Units Subcutaneous [facility] Serge Gutiérrez PA-C      HYDROmorphone  0 5 mg Intravenous Q4H PRN Serge Gutiérrez PA-C      hydrOXYzine HCL  25 mg Oral Q6H PRN Serge Gutiérrez PA-C      levothyroxine  50 mcg Oral Early Morning Serge Gutiérrez PA-C      liothyronine  5 mcg Oral Daily Serge Gutiérrez PA-C      LORazepam  0 5 mg Intravenous Q4H PRN Serge Gutiérrez PA-C      methylPREDNISolone sodium succinate  40 mg Intravenous Q12H [facility] Alvino ThomasRANDEE      montelukast  10 mg Oral BID Serge Gutiérrez PA-C      ondansetron  4 mg Intravenous Q6H PRN Serge Gutiérrez PA-C      polyethylene glycol  17 g Oral BID Serge Gutiérrez PA-C      polyethylene glycol-electrolytes  4,000 mL Oral See Admin Instructions Sally Severe, MD      sodium chloride  75 mL/hr Intravenous Continuous Serge Gutiérrez PA-C 75 mL/hr (03/08/21 8719)    temazepam  30 mg Oral HS PRN Serge Gutiérrez PA-C      Vitamin B-2  400 mg Oral Daily Serge Gutiérrez PA-C          Today, Patient Was Seen By: Serge Gutiérrez PA-C    ** Please Note: Dictation voice to text software may have been used in the creation of this document   **

## 2021-03-09 NOTE — ASSESSMENT & PLAN NOTE
Uncertain if crohn's vs  UC based on GI notes  On IV Stelara infusions  Initially held IV steroids pending stool sample collection, need to rule out bacterial colitis, however, given discontinuation of diarrhea for several days, resolution of leukocytosis, and afebrile state, patient was started on IV steroids  Received IV solumedrol 60mg once on 3/7 in AM, then 40mg IV BID dosing- will continue this for now  Continue with clear liquid diet for now    Planned colonoscopy today 3/9

## 2021-03-09 NOTE — ASSESSMENT & PLAN NOTE
Patient is seen/managed by physician in [place]   -  Dr Crys Daly at [facility] - [phone]  Spoke with allergist on-call today who noted the patient's symptoms who recommended Steroid taper, no need for transfer  Due to ongoing symptoms and inability to discharge from hospital will continue with IV steroids 40 mg b i d  continue with IV Benadryl as needed   Avoid known triggers

## 2021-03-09 NOTE — ANESTHESIA POSTPROCEDURE EVALUATION
Post-Op Assessment Note    CV Status:  Stable  Pain Score: 0    Pain management: adequate     Mental Status:  Sleepy   Hydration Status:  Stable   PONV Controlled:  None   Airway Patency:  Patent      Post Op Vitals Reviewed: Yes      Staff: CRNA         No complications documented      /74 (03/09/21 1256)    Temp      Pulse 65 (03/09/21 1256)   Resp 16 (03/09/21 1256)    SpO2 100 % (03/09/21 1256)

## 2021-03-09 NOTE — ANESTHESIA PREPROCEDURE EVALUATION
Procedure:  COLONOSCOPY    Relevant Problems   CARDIO   (+) CHF (congestive heart failure) (HCC)      ENDO   (+) Hypothyroidism (acquired)      MUSCULOSKELETAL   (+) Chondromalacia   (+) Chronic back pain   (+) Fibromyalgia   (+) Primary localized osteoarthrosis of ankle and foot      NEURO/PSYCH   (+) Chronic back pain   (+) Fibromyalgia   (+) Generalized anxiety disorder   (+) Headache      Other   (+) 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[facility] [address] )     2019: EF 60, normal RV systolic function, trace TR  hgb 12 2, plt 374, cr 0 7    Pre-medicated    S/p hysterectomy         Anesthesia Plan  ASA Score- 3     Anesthesia Type- IV sedation with anesthesia with ASA Monitors  Additional Monitors:   Airway Plan:     Comment: IV sedation,  standard ASA monitors  Risks and benefits discussed with patient; patient consented and agrees to proceed  I saw and evaluated the patient  If seen with CRNA, we have discussed the anesthetic plan and I am in agreement that the plan is appropriate for the patient          Plan Factors-    Induction- intravenous  Postoperative Plan-     Informed Consent- Anesthetic plan and risks discussed with patient  I personally reviewed this patient with the CRNA  Discussed and agreed on the Anesthesia Plan with the CRNA  Diana Myles

## 2021-03-10 DIAGNOSIS — Z23 ENCOUNTER FOR IMMUNIZATION: ICD-10-CM

## 2021-03-10 LAB
ANION GAP SERPL CALCULATED.3IONS-SCNC: 9 MMOL/L (ref 4–13)
BASOPHILS # BLD AUTO: 0.01 THOUSANDS/ΜL (ref 0–0.1)
BASOPHILS NFR BLD AUTO: 0 % (ref 0–1)
BUN SERPL-MCNC: 7 MG/DL (ref 5–25)
CALCIUM SERPL-MCNC: 8.6 MG/DL (ref 8.3–10.1)
CHLORIDE SERPL-SCNC: 105 MMOL/L (ref 100–108)
CO2 SERPL-SCNC: 26 MMOL/L (ref 21–32)
CREAT SERPL-MCNC: 0.68 MG/DL (ref 0.6–1.3)
EOSINOPHIL # BLD AUTO: 0.01 THOUSAND/ΜL (ref 0–0.61)
EOSINOPHIL NFR BLD AUTO: 0 % (ref 0–6)
ERYTHROCYTE [DISTWIDTH] IN BLOOD BY AUTOMATED COUNT: 14.1 % (ref 11.6–15.1)
GFR SERPL CREATININE-BSD FRML MDRD: 104 ML/MIN/1.73SQ M
GLUCOSE SERPL-MCNC: 121 MG/DL (ref 65–140)
HCT VFR BLD AUTO: 36.5 % (ref 34.8–46.1)
HGB BLD-MCNC: 11.7 G/DL (ref 11.5–15.4)
IMM GRANULOCYTES # BLD AUTO: 0.04 THOUSAND/UL (ref 0–0.2)
IMM GRANULOCYTES NFR BLD AUTO: 0 % (ref 0–2)
LYMPHOCYTES # BLD AUTO: 1.07 THOUSANDS/ΜL (ref 0.6–4.47)
LYMPHOCYTES NFR BLD AUTO: 9 % (ref 14–44)
MAGNESIUM SERPL-MCNC: 1.8 MG/DL (ref 1.6–2.6)
MCH RBC QN AUTO: 30.8 PG (ref 26.8–34.3)
MCHC RBC AUTO-ENTMCNC: 32.1 G/DL (ref 31.4–37.4)
MCV RBC AUTO: 96 FL (ref 82–98)
MONOCYTES # BLD AUTO: 0.85 THOUSAND/ΜL (ref 0.17–1.22)
MONOCYTES NFR BLD AUTO: 7 % (ref 4–12)
NEUTROPHILS # BLD AUTO: 9.94 THOUSANDS/ΜL (ref 1.85–7.62)
NEUTS SEG NFR BLD AUTO: 84 % (ref 43–75)
NRBC BLD AUTO-RTO: 0 /100 WBCS
O+P STL CONC: NORMAL
PHOSPHATE SERPL-MCNC: 2.6 MG/DL (ref 2.7–4.5)
PLATELET # BLD AUTO: 463 THOUSANDS/UL (ref 149–390)
PMV BLD AUTO: 9.5 FL (ref 8.9–12.7)
POTASSIUM SERPL-SCNC: 3.5 MMOL/L (ref 3.5–5.3)
RBC # BLD AUTO: 3.8 MILLION/UL (ref 3.81–5.12)
SODIUM SERPL-SCNC: 140 MMOL/L (ref 136–145)
WBC # BLD AUTO: 11.92 THOUSAND/UL (ref 4.31–10.16)
WBC SPEC QL GRAM STN: NORMAL

## 2021-03-10 PROCEDURE — 80048 BASIC METABOLIC PNL TOTAL CA: CPT | Performed by: INTERNAL MEDICINE

## 2021-03-10 PROCEDURE — 85025 COMPLETE CBC W/AUTO DIFF WBC: CPT | Performed by: INTERNAL MEDICINE

## 2021-03-10 PROCEDURE — 84100 ASSAY OF PHOSPHORUS: CPT | Performed by: INTERNAL MEDICINE

## 2021-03-10 PROCEDURE — 99232 SBSQ HOSP IP/OBS MODERATE 35: CPT | Performed by: PHYSICIAN ASSISTANT

## 2021-03-10 PROCEDURE — 99232 SBSQ HOSP IP/OBS MODERATE 35: CPT | Performed by: INTERNAL MEDICINE

## 2021-03-10 PROCEDURE — 83735 ASSAY OF MAGNESIUM: CPT | Performed by: INTERNAL MEDICINE

## 2021-03-10 PROCEDURE — C9113 INJ PANTOPRAZOLE SODIUM, VIA: HCPCS | Performed by: PHYSICIAN ASSISTANT

## 2021-03-10 RX ORDER — SODIUM CHLORIDE, SODIUM LACTATE, POTASSIUM CHLORIDE, CALCIUM CHLORIDE 600; 310; 30; 20 MG/100ML; MG/100ML; MG/100ML; MG/100ML
75 INJECTION, SOLUTION INTRAVENOUS CONTINUOUS
Status: DISCONTINUED | OUTPATIENT
Start: 2021-03-10 | End: 2021-03-11

## 2021-03-10 RX ORDER — PANTOPRAZOLE SODIUM 40 MG/1
40 INJECTION, POWDER, FOR SOLUTION INTRAVENOUS EVERY 12 HOURS SCHEDULED
Status: DISCONTINUED | OUTPATIENT
Start: 2021-03-10 | End: 2021-03-12 | Stop reason: HOSPADM

## 2021-03-10 RX ADMIN — DIPHENHYDRAMINE HYDROCHLORIDE 50 MG: 50 INJECTION, SOLUTION INTRAMUSCULAR; INTRAVENOUS at 09:42

## 2021-03-10 RX ADMIN — MONTELUKAST 10 MG: 10 TABLET, FILM COATED ORAL at 08:28

## 2021-03-10 RX ADMIN — METHYLPREDNISOLONE SODIUM SUCCINATE 40 MG: 40 INJECTION, POWDER, FOR SOLUTION INTRAMUSCULAR; INTRAVENOUS at 08:34

## 2021-03-10 RX ADMIN — SODIUM CHLORIDE, SODIUM LACTATE, POTASSIUM CHLORIDE, AND CALCIUM CHLORIDE 75 ML/HR: .6; .31; .03; .02 INJECTION, SOLUTION INTRAVENOUS at 14:49

## 2021-03-10 RX ADMIN — LORAZEPAM 0.5 MG: 2 INJECTION INTRAMUSCULAR; INTRAVENOUS at 10:17

## 2021-03-10 RX ADMIN — DIPHENHYDRAMINE HYDROCHLORIDE 50 MG: 50 INJECTION, SOLUTION INTRAMUSCULAR; INTRAVENOUS at 13:44

## 2021-03-10 RX ADMIN — PANTOPRAZOLE SODIUM 40 MG: 40 INJECTION, POWDER, LYOPHILIZED, FOR SOLUTION INTRAVENOUS at 20:57

## 2021-03-10 RX ADMIN — HYDROXYZINE HYDROCHLORIDE 25 MG: 25 TABLET, FILM COATED ORAL at 21:07

## 2021-03-10 RX ADMIN — METHYLPREDNISOLONE SODIUM SUCCINATE 40 MG: 40 INJECTION, POWDER, FOR SOLUTION INTRAMUSCULAR; INTRAVENOUS at 20:57

## 2021-03-10 RX ADMIN — LORAZEPAM 0.5 MG: 2 INJECTION INTRAMUSCULAR; INTRAVENOUS at 14:49

## 2021-03-10 RX ADMIN — ONDANSETRON 4 MG: 2 INJECTION INTRAMUSCULAR; INTRAVENOUS at 13:44

## 2021-03-10 RX ADMIN — SODIUM CHLORIDE 500 ML: 0.9 INJECTION, SOLUTION INTRAVENOUS at 14:47

## 2021-03-10 RX ADMIN — DIPHENHYDRAMINE HYDROCHLORIDE 50 MG: 50 INJECTION, SOLUTION INTRAMUSCULAR; INTRAVENOUS at 05:25

## 2021-03-10 RX ADMIN — HYDROMORPHONE HYDROCHLORIDE 0.5 MG: 1 INJECTION, SOLUTION INTRAMUSCULAR; INTRAVENOUS; SUBCUTANEOUS at 23:57

## 2021-03-10 RX ADMIN — LORAZEPAM 0.5 MG: 2 INJECTION INTRAMUSCULAR; INTRAVENOUS at 05:49

## 2021-03-10 RX ADMIN — HYDROMORPHONE HYDROCHLORIDE 0.5 MG: 1 INJECTION, SOLUTION INTRAMUSCULAR; INTRAVENOUS; SUBCUTANEOUS at 05:49

## 2021-03-10 RX ADMIN — ONDANSETRON 4 MG: 2 INJECTION INTRAMUSCULAR; INTRAVENOUS at 17:55

## 2021-03-10 RX ADMIN — HYDROMORPHONE HYDROCHLORIDE 0.5 MG: 1 INJECTION, SOLUTION INTRAMUSCULAR; INTRAVENOUS; SUBCUTANEOUS at 14:49

## 2021-03-10 RX ADMIN — MONTELUKAST 10 MG: 10 TABLET, FILM COATED ORAL at 20:57

## 2021-03-10 RX ADMIN — POLYETHYLENE GLYCOL 3350 17 G: 17 POWDER, FOR SOLUTION ORAL at 17:56

## 2021-03-10 RX ADMIN — ONDANSETRON 4 MG: 2 INJECTION INTRAMUSCULAR; INTRAVENOUS at 01:50

## 2021-03-10 RX ADMIN — DIPHENHYDRAMINE HYDROCHLORIDE 50 MG: 50 INJECTION, SOLUTION INTRAMUSCULAR; INTRAVENOUS at 22:34

## 2021-03-10 RX ADMIN — POLYETHYLENE GLYCOL 3350 17 G: 17 POWDER, FOR SOLUTION ORAL at 08:36

## 2021-03-10 RX ADMIN — CROMOLYN SODIUM 200 MG: 100 SOLUTION, CONCENTRATE ORAL at 11:31

## 2021-03-10 RX ADMIN — HYDROMORPHONE HYDROCHLORIDE 0.5 MG: 1 INJECTION, SOLUTION INTRAMUSCULAR; INTRAVENOUS; SUBCUTANEOUS at 01:00

## 2021-03-10 RX ADMIN — ERGOCALCIFEROL 50000 UNITS: 1.25 CAPSULE ORAL at 08:28

## 2021-03-10 RX ADMIN — PANTOPRAZOLE SODIUM 40 MG: 40 INJECTION, POWDER, LYOPHILIZED, FOR SOLUTION INTRAVENOUS at 14:56

## 2021-03-10 RX ADMIN — ONDANSETRON 4 MG: 2 INJECTION INTRAMUSCULAR; INTRAVENOUS at 09:42

## 2021-03-10 RX ADMIN — LORAZEPAM 0.5 MG: 2 INJECTION INTRAMUSCULAR; INTRAVENOUS at 18:56

## 2021-03-10 RX ADMIN — LORAZEPAM 0.5 MG: 2 INJECTION INTRAMUSCULAR; INTRAVENOUS at 01:50

## 2021-03-10 RX ADMIN — DIPHENHYDRAMINE HYDROCHLORIDE 50 MG: 50 INJECTION, SOLUTION INTRAMUSCULAR; INTRAVENOUS at 01:00

## 2021-03-10 RX ADMIN — POTASSIUM PHOSPHATE, MONOBASIC AND POTASSIUM PHOSPHATE, DIBASIC 9 MMOL: 224; 236 INJECTION, SOLUTION, CONCENTRATE INTRAVENOUS at 11:27

## 2021-03-10 RX ADMIN — ONDANSETRON 4 MG: 2 INJECTION INTRAMUSCULAR; INTRAVENOUS at 23:57

## 2021-03-10 RX ADMIN — HYDROMORPHONE HYDROCHLORIDE 0.5 MG: 1 INJECTION, SOLUTION INTRAMUSCULAR; INTRAVENOUS; SUBCUTANEOUS at 18:57

## 2021-03-10 RX ADMIN — DIPHENHYDRAMINE HYDROCHLORIDE 50 MG: 50 INJECTION, SOLUTION INTRAMUSCULAR; INTRAVENOUS at 17:56

## 2021-03-10 RX ADMIN — CETIRIZINE HYDROCHLORIDE 10 MG: 10 TABLET, FILM COATED ORAL at 08:27

## 2021-03-10 RX ADMIN — SODIUM CHLORIDE 75 ML/HR: 0.9 INJECTION, SOLUTION INTRAVENOUS at 00:39

## 2021-03-10 RX ADMIN — LORAZEPAM 0.5 MG: 2 INJECTION INTRAMUSCULAR; INTRAVENOUS at 23:57

## 2021-03-10 RX ADMIN — Medication 1200 UNITS: at 08:27

## 2021-03-10 RX ADMIN — LEVOTHYROXINE SODIUM 50 MCG: 0.05 TABLET ORAL at 05:27

## 2021-03-10 RX ADMIN — HYDROMORPHONE HYDROCHLORIDE 0.5 MG: 1 INJECTION, SOLUTION INTRAMUSCULAR; INTRAVENOUS; SUBCUTANEOUS at 10:18

## 2021-03-10 NOTE — PLAN OF CARE
Problem: Potential for Falls  Goal: Patient will remain free of falls  Description: INTERVENTIONS:  - Assess patient frequently for physical needs  -  Identify cognitive and physical deficits and behaviors that affect risk of falls    -  [place] fall precautions as indicated by assessment   - Educate patient/family on patient safety including physical limitations  - Instruct patient to call for assistance with activity based on assessment  - Modify environment to reduce risk of injury  - Consider OT/PT consult to assist with strengthening/mobility  Outcome: Progressing     Problem: GASTROINTESTINAL - ADULT  Goal: Minimal or absence of nausea and/or vomiting  Description: INTERVENTIONS:  - Administer IV fluids if ordered to ensure adequate hydration  - Maintain NPO status until nausea and vomiting are resolved  - Nasogastric tube if ordered  - Administer ordered antiemetic medications as needed  - Provide nonpharmacologic comfort measures as appropriate  - Advance diet as tolerated, if ordered  - Consider nutrition services referral to assist patient with adequate nutrition and appropriate food choices  Outcome: Completed  Goal: Maintains or returns to baseline bowel function  Description: INTERVENTIONS:  - Assess bowel function  - Encourage oral fluids to ensure adequate hydration  - Administer IV fluids if ordered to ensure adequate hydration  - Administer ordered medications as needed  - Encourage mobilization and activity  - Consider nutritional services referral to assist patient with adequate nutrition and appropriate food choices  Outcome: Progressing  Goal: Maintains adequate nutritional intake  Description: INTERVENTIONS:  - Monitor percentage of each meal consumed  - Identify factors contributing to decreased intake, treat as appropriate  - Assist with meals as needed  - Monitor I&O, weight, and lab values if indicated  - Obtain nutrition services referral as needed  Outcome: Progressing     Problem: METABOLIC, FLUID AND ELECTROLYTES - ADULT  Goal: Electrolytes maintained within normal limits  Description: INTERVENTIONS:  - Monitor labs and assess patient for signs and symptoms of electrolyte imbalances  - Administer electrolyte replacement as ordered  - Monitor response to electrolyte replacements, including repeat lab results as appropriate  - Instruct patient on fluid and nutrition as appropriate  Outcome: Progressing     Problem: PAIN - ADULT  Goal: Verbalizes/displays adequate comfort level or baseline comfort level  Description: Interventions:  - Encourage patient to monitor pain and request assistance  - Assess pain using appropriate pain scale (0-10 pain scale)  - Administer analgesics based on type and severity of pain and evaluate response  - Implement non-pharmacological measures as appropriate and evaluate response  - Consider cultural and social influences on pain and pain management  - Notify physician/advanced practitioner if interventions unsuccessful or patient reports new pain  Outcome: Progressing     Problem: INFECTION - ADULT  Goal: Absence or prevention of progression during hospitalization  Description: INTERVENTIONS:  - Assess and monitor for signs and symptoms of infection  - Monitor lab/diagnostic results  - Monitor all insertion sites, i e  indwelling lines, tubes, and drains  - Monitor endotracheal if appropriate and nasal secretions for changes in amount and color  - [place] appropriate cooling/warming therapies per order  - Administer medications as ordered  - Instruct and encourage patient and family to use good hand hygiene technique  - Identify and instruct in appropriate isolation precautions for identified infection/condition  Outcome: Progressing     Problem: SAFETY ADULT  Goal: Maintain or return to baseline ADL function  Description: INTERVENTIONS:  -  Assess patient's ability to carry out ADLs; assess patient's baseline for ADL function and identify physical deficits which impact ability to perform ADLs (bathing, care of mouth/teeth, toileting, grooming, dressing, etc )  - Assess/evaluate cause of self-care deficits   - Assess range of motion  - Assess patient's mobility; develop plan if impaired  - Assess patient's need for assistive devices and provide as appropriate  - Encourage maximum independence but intervene and supervise when necessary  - Involve family in performance of ADLs  - Assess for home care needs following discharge   - Consider OT consult to assist with ADL evaluation and planning for discharge  - Provide patient education as appropriate  Outcome: Progressing  Goal: Maintain or return mobility status to optimal level  Description: INTERVENTIONS:  - Assess patient's baseline mobility status (ambulation, transfers, stairs, etc )    - Identify cognitive and physical deficits and behaviors that affect mobility  - Identify mobility aids required to assist with transfers and/or ambulation (gait belt, sit-to-stand, lift, walker, cane, etc )  - [place] fall precautions as indicated by assessment  - Record patient progress and toleration of activity level on Mobility SBAR; progress patient to next Phase/Stage  - Instruct patient to call for assistance with activity based on assessment  - Consider rehabilitation consult to assist with strengthening/weightbearing, etc   Outcome: Progressing     Problem: DISCHARGE PLANNING  Goal: Discharge to home or other facility with appropriate resources  Description: INTERVENTIONS:  - Identify barriers to discharge w/patient and caregiver  - Arrange for needed discharge resources and transportation as appropriate  - Identify discharge learning needs (meds, wound care, etc )  - Arrange for interpretive services to assist at discharge as needed  - Refer to Case Management Department for coordinating discharge planning if the patient needs post-hospital services based on physician/advanced practitioner order or complex needs related to functional status, cognitive ability, or social support system  Outcome: Progressing     Problem: Knowledge Deficit  Goal: Patient/family/caregiver demonstrates understanding of disease process, treatment plan, medications, and discharge instructions  Description: Complete learning assessment and assess knowledge base    Interventions:  - Provide teaching at level of understanding  - Provide teaching via preferred learning methods  Outcome: Progressing

## 2021-03-10 NOTE — ASSESSMENT & PLAN NOTE
Patient is seen/managed by physician in [place]   -  Dr Bairon Pablo at [facility] - [phone]  Spoke with allergist on-call today who noted the patient's symptoms who recommended Steroid taper, no need for transfer  Due to ongoing symptoms and inability to discharge from hospital will continue with IV steroids 40 mg b i d  continue with IV Benadryl as needed   Avoid known triggers

## 2021-03-10 NOTE — PLAN OF CARE
Problem: Potential for Falls  Goal: Patient will remain free of falls  Description: INTERVENTIONS:  - Assess patient frequently for physical needs  -  Identify cognitive and physical deficits and behaviors that affect risk of falls    -  [place] fall precautions as indicated by assessment   - Educate patient/family on patient safety including physical limitations  - Instruct patient to call for assistance with activity based on assessment  - Modify environment to reduce risk of injury  - Consider OT/PT consult to assist with strengthening/mobility  Outcome: Progressing     Problem: GASTROINTESTINAL - ADULT  Goal: Maintains or returns to baseline bowel function  Description: INTERVENTIONS:  - Assess bowel function  - Encourage oral fluids to ensure adequate hydration  - Administer IV fluids if ordered to ensure adequate hydration  - Administer ordered medications as needed  - Encourage mobilization and activity  - Consider nutritional services referral to assist patient with adequate nutrition and appropriate food choices  Outcome: Progressing  Goal: Maintains adequate nutritional intake  Description: INTERVENTIONS:  - Monitor percentage of each meal consumed  - Identify factors contributing to decreased intake, treat as appropriate  - Assist with meals as needed  - Monitor I&O, weight, and lab values if indicated  - Obtain nutrition services referral as needed  Outcome: Progressing  Goal: Minimal or absence of nausea and/or vomiting  Description: INTERVENTIONS:  - Administer IV fluids if ordered to ensure adequate hydration  - Administer ordered antiemetic medications as needed (PRN Zofran)  - Provide nonpharmacologic comfort measures as appropriate  - Advance diet as tolerated, if ordered  - Consider nutrition services referral to assist patient with adequate nutrition and appropriate food choices  Outcome: Progressing     Problem: METABOLIC, FLUID AND ELECTROLYTES - ADULT  Goal: Electrolytes maintained within normal limits  Description: INTERVENTIONS:  - Monitor labs and assess patient for signs and symptoms of electrolyte imbalances  - Administer electrolyte replacement as ordered  - Monitor response to electrolyte replacements, including repeat lab results as appropriate  - Instruct patient on fluid and nutrition as appropriate  Outcome: Progressing     Problem: PAIN - ADULT  Goal: Verbalizes/displays adequate comfort level or baseline comfort level  Description: Interventions:  - Encourage patient to monitor pain and request assistance  - Assess pain using appropriate pain scale (0-10 pain scale)  - Administer analgesics based on type and severity of pain and evaluate response  - Implement non-pharmacological measures as appropriate and evaluate response  - Consider cultural and social influences on pain and pain management  - Notify physician/advanced practitioner if interventions unsuccessful or patient reports new pain  Outcome: Progressing     Problem: INFECTION - ADULT  Goal: Absence or prevention of progression during hospitalization  Description: INTERVENTIONS:  - Assess and monitor for signs and symptoms of infection  - Monitor lab/diagnostic results  - Monitor all insertion sites, i e  indwelling lines, tubes, and drains  - Monitor endotracheal if appropriate and nasal secretions for changes in amount and color  - [place] appropriate cooling/warming therapies per order  - Administer medications as ordered  - Instruct and encourage patient and family to use good hand hygiene technique  - Identify and instruct in appropriate isolation precautions for identified infection/condition  Outcome: Progressing     Problem: SAFETY ADULT  Goal: Maintain or return to baseline ADL function  Description: INTERVENTIONS:  -  Assess patient's ability to carry out ADLs; assess patient's baseline for ADL function and identify physical deficits which impact ability to perform ADLs (bathing, care of mouth/teeth, toileting, grooming, dressing, etc )  - Assess/evaluate cause of self-care deficits   - Assess range of motion  - Assess patient's mobility; develop plan if impaired  - Assess patient's need for assistive devices and provide as appropriate  - Encourage maximum independence but intervene and supervise when necessary  - Involve family in performance of ADLs  - Assess for home care needs following discharge   - Consider OT consult to assist with ADL evaluation and planning for discharge  - Provide patient education as appropriate  Outcome: Progressing  Goal: Maintain or return mobility status to optimal level  Description: INTERVENTIONS:  - Assess patient's baseline mobility status (ambulation, transfers, stairs, etc )    - Identify cognitive and physical deficits and behaviors that affect mobility  - Identify mobility aids required to assist with transfers and/or ambulation (gait belt, sit-to-stand, lift, walker, cane, etc )  - [place] fall precautions as indicated by assessment  - Record patient progress and toleration of activity level on Mobility SBAR; progress patient to next Phase/Stage  - Instruct patient to call for assistance with activity based on assessment  - Consider rehabilitation consult to assist with strengthening/weightbearing, etc   Outcome: Progressing     Problem: DISCHARGE PLANNING  Goal: Discharge to home or other facility with appropriate resources  Description: INTERVENTIONS:  - Identify barriers to discharge w/patient and caregiver  - Arrange for needed discharge resources and transportation as appropriate  - Identify discharge learning needs (meds, wound care, etc )  - Arrange for interpretive services to assist at discharge as needed  - Refer to Case Management Department for coordinating discharge planning if the patient needs post-hospital services based on physician/advanced practitioner order or complex needs related to functional status, cognitive ability, or social support system  Outcome: Progressing     Problem: Knowledge Deficit  Goal: Patient/family/caregiver demonstrates understanding of disease process, treatment plan, medications, and discharge instructions  Description: Complete learning assessment and assess knowledge base    Interventions:  - Provide teaching at level of understanding  - Provide teaching via preferred learning methods  Outcome: Progressing

## 2021-03-10 NOTE — ASSESSMENT & PLAN NOTE
Patient admits to right upper quadrant pain accompanied with nausea vomiting and 15 diarrheal episodes on the day prior to admission  Stool studies ordered  Formed stool collected 3/7/21  Stool Enteric Bacterial Panel- negative   Fecal calprotectin- pending   Ova and parasite- negative   Fecal leukocytes- negative    CT abdomen/pelvis: Bowel wall thickening of the descending colon extending to the rectum suggestive of colitis of an infectious/inflammatory etiology  2 6 cm fluid attenuation structure in the right adnexal region may represent an ovarian cyst in the absence of a history of oophorectomy  Transvaginal US: Limited visualization of a complex-appearing cystic structure in the right ovary measuring 2 6 cm  Recommend follow-up pelvic ultrasound in 6-12 weeks  3/7 - Ongoing pain, worse with food intake  Only tolerating clear liquid diet, but no further N/V  No diarrhea  Obstruction series - negative  Liver function testing and lipase - normal     US RUQ: Unremarkable right upper quadrant exam status post cholecystectomy      Colonoscopy:  IMPRESSION:  Three large linear aphthous appearing ulcers in the sigmoid colon approximately 45 cm from the anal verge suggestive of Crohn's disease or ischemia     Unfortunately I was unable to enter the terminal ileum        RECOMMENDATION:  Repeat in 5 years due to a personal history of colon polyps  Await pathology results  Continue current medications    EGD:   IMPRESSION:  Normal upper endoscopy      RECOMMENDATION:  Await pathology results  Continue current medications  Colonoscopy to follow

## 2021-03-10 NOTE — PROGRESS NOTES
[address]  Progress Note - Clarence Beckham 1975, 48 y o  female MRN: [id number]  Unit/Bed#: 425-01 Encounter: [id number]  Primary Care Provider: No primary care provider on file  Date and time admitted to hospital: 3/3/2021  1:26 PM    Diarrhea  Assessment & Plan  Patient admits to 15 diarrheal episodes over the past 24 hours prior to admission  History of IBD  Enteric panel, C diff, and other stool studies per GI ordered- specimen not collected on admission given diarrhea had resolved  C diff therefore cancelled  Formed stool sample collected 3/7  Initially held off on antibiotics and steroids pending stool specimen needing to be collected  Stool Enteric Bacterial Panel- negative  Fecal calprotectin- pending  Ova and parasite, and Fecal leukocytes- negative   Continue IV steroids  GI consultation appreciated  Mast cell activation syndrome [facility])  Assessment & Plan  Patient is seen/managed by physician in [place]     Dr Knapp Room at [facility] AND [facility] - [phone]  Spoke with allergist on-call today who noted the patient's symptoms who recommended Steroid taper, no need for transfer  Due to ongoing symptoms and inability to discharge from hospital will continue with IV steroids 40 mg b i d  continue with IV Benadryl as needed   Avoid known triggers  * Abdominal pain  Assessment & Plan  Patient admits to right upper quadrant pain accompanied with nausea vomiting and 15 diarrheal episodes on the day prior to admission  Stool studies ordered  Formed stool collected 3/7/21  Stool Enteric Bacterial Panel- negative   Fecal calprotectin- pending   Ova and parasite- negative   Fecal leukocytes- negative    CT abdomen/pelvis: Bowel wall thickening of the descending colon extending to the rectum suggestive of colitis of an infectious/inflammatory etiology    2 6 cm fluid attenuation structure in the right adnexal region may represent an ovarian cyst in the absence of a history of oophorectomy  Transvaginal US: Limited visualization of a complex-appearing cystic structure in the right ovary measuring 2 6 cm  Recommend follow-up pelvic ultrasound in 6-12 weeks  3/7 - Ongoing pain, worse with food intake  Only tolerating clear liquid diet, but no further N/V  No diarrhea  Obstruction series - negative  Liver function testing and lipase - normal     US RUQ: Unremarkable right upper quadrant exam status post cholecystectomy  Colonoscopy:  IMPRESSION:  Three large linear aphthous appearing ulcers in the sigmoid colon approximately 45 cm from the anal verge suggestive of Crohn's disease or ischemia     Unfortunately I was unable to enter the terminal ileum        RECOMMENDATION:  Repeat in 5 years due to a personal history of colon polyps  Await pathology results  Continue current medications    EGD:   IMPRESSION:  Normal upper endoscopy      RECOMMENDATION:  Await pathology results  Continue current medications  Colonoscopy to follow    Leukocytosis  Assessment & Plan  As evidenced by white blood cell count of 10 61 on admission, peaked at 13 40- now resolved  Patient with known mast cell activation and IBD utilizing home steroids  Trend CBC daily        Thrombocytosis (HCC)  Assessment & Plan  platelet count of 226 today  Platelet count of 730 on admission  Trend CBC daily    Hypothyroidism (acquired)  Assessment & Plan  Patient reportedly not taking levothyroxine for last several weeks  Patient currently refusing this  Generalized anxiety disorder  Assessment & Plan  Patient is less anxious today  Continue Buspar, atarax, temazepam   Patient reports can only take her own home prescription pills-currently will provide Ativan 0 5 p r n  As needed for anxiety      Hypokalemia  Assessment & Plan  Potassium level 3 2 on admission  Resolved since receiving IV Potassium 40 mEq  Trend potassium q d    Replete as needed    Inflammatory bowel disease  Assessment & Plan  Uncertain if crohn's vs  UC based on GI notes  On IV Stelara infusions  Initially held IV steroids pending stool sample collection, need to rule out bacterial colitis, however, given discontinuation of diarrhea for several days, resolution of leukocytosis, and afebrile state, patient was started on IV steroids  Received IV solumedrol 60mg once on 3/7 in AM, then 40mg IV BID dosing- will continue this for now  Continue with clear liquid diet for now  Planned colonoscopy today 3/9      VTE Pharmacologic Prophylaxis:   Pharmacologic: Patient has refused VTE prophylaxis  Mechanical VTE Prophylaxis in Place: Yes    Patient Centered Rounds: I have performed bedside rounds with nursing staff today  Discussions with Specialists or Other Care Team Provider: nursing, CM    Education and Discussions with Family / Patient: patient    Time Spent for Care: 20 minutes  More than 50% of total time spent on counseling and coordination of care as described above  Current Length of Stay: 7 day(s)    Current Patient Status: Inpatient   Certification Statement: The patient will continue to require additional inpatient hospital stay due to continued need for IV fluids, IV steroids  Discharge Plan: TBD    Code Status: Level 1 - Full Code      Subjective: The patient was seen and examined  The patient states she continues to have abdominal pain  She states she feels dehydrated  She denies diarrhea  Objective:     Vitals:   Temp (24hrs), Av 3 °F (36 8 °C), Min:98 3 °F (36 8 °C), Max:98 3 °F (36 8 °C)    Temp:  [98 3 °F (36 8 °C)] 98 3 °F (36 8 °C)  HR:  [55] 55  Resp:  [16] 16  BP: (96)/(53) 96/53  SpO2:  [95 %] 95 %  Body mass index is 23 99 kg/m²  Input and Output Summary (last 24 hours):        Intake/Output Summary (Last 24 hours) at 3/10/2021 1804  Last data filed at 3/10/2021 1330  Gross per 24 hour   Intake 480 ml   Output --   Net 480 ml       Physical Exam:     Physical Exam  Vitals signs and nursing note reviewed  Constitutional:       General: She is awake  Cardiovascular:      Rate and Rhythm: Normal rate and regular rhythm  Pulmonary:      Effort: Pulmonary effort is normal       Breath sounds: Normal breath sounds  No wheezing, rhonchi or rales  Abdominal:      General: Bowel sounds are normal       Palpations: Abdomen is soft  Tenderness: There is abdominal tenderness in the right upper quadrant  There is no guarding or rebound  Skin:     General: Skin is warm and dry  Neurological:      General: No focal deficit present  Mental Status: She is alert and oriented to person, place, and time  Psychiatric:         Attention and Perception: Attention normal          Mood and Affect: Mood normal          Speech: Speech normal          Behavior: Behavior is cooperative  Additional Data:     Labs:    Results from last 7 days   Lab Units 03/10/21  0607   WBC Thousand/uL 11 92*   HEMOGLOBIN g/dL 11 7   HEMATOCRIT % 36 5   PLATELETS Thousands/uL 463*   NEUTROS PCT % 84*   LYMPHS PCT % 9*   MONOS PCT % 7   EOS PCT % 0     Results from last 7 days   Lab Units 03/10/21  0607  03/07/21  0512   SODIUM mmol/L 140   < > 139   POTASSIUM mmol/L 3 5   < > 4 0   CHLORIDE mmol/L 105   < > 106   CO2 mmol/L 26   < > 27   BUN mg/dL 7   < > 6   CREATININE mg/dL 0 68   < > 0 81   ANION GAP mmol/L 9   < > 6   CALCIUM mg/dL 8 6   < > 8 2*   ALBUMIN g/dL  --   --  2 8*   TOTAL BILIRUBIN mg/dL  --   --  0 40   ALK PHOS U/L  --   --  43*   ALT U/L  --   --  34   AST U/L  --   --  17   GLUCOSE RANDOM mg/dL 121   < > 80    < > = values in this interval not displayed  * I Have Reviewed All Lab Data Listed Above  * Additional Pertinent Lab Tests Reviewed:  All Labs Within Last 24 Hours Reviewed    Imaging:    Imaging Reports Reviewed Today Include: none  Imaging Personally Reviewed by Myself Includes:  none    Recent Cultures (last 7 days):           Last 24 Hours Medication List:   Current Facility-Administered Medications   Medication Dose Route Frequency Provider Last Rate    busPIRone  5 mg Oral Q24H Carmen Hernandez MD      calcium citrate  950 mg Oral Daily Carmen Hernandez MD      cetirizine  10 mg Oral Daily Carmen Hernandez MD      cholecalciferol  1,200 Units Oral Daily Carmen Hernandez MD      cromolyn  200 mg Oral 4x Daily [facility] Carmen Hernandez MD      diclofenac  75 mg Oral BID Carmen Hernandez MD      diphenhydrAMINE  50 mg Intravenous Q4H PRN Carmen Hernandez MD      ergocalciferol  50,000 Units Oral Weekly Carmen Hernandez MD      heparin (porcine)  5,000 Units Subcutaneous [facility] Carmen Hernandez MD      HYDROmorphone  0 5 mg Intravenous Q4H PRN Carmen Hernandez MD      hydrOXYzine HCL  25 mg Oral Q6H PRN Carmen Hernandez MD      lactated ringers  75 mL/hr Intravenous Continuous Jinny Mendoza PA-C 75 mL/hr (03/10/21 1449)    levothyroxine  50 mcg Oral Early Morning Carmen Hernandez MD      liothyronine  5 mcg Oral Daily Carmen Hernandez MD      LORazepam  0 5 mg Intravenous Q4H PRN Carmen Hernandez MD      methylPREDNISolone sodium succinate  40 mg Intravenous Q12H [address] Carmen Hernandez MD      montelukast  10 mg Oral BID Carmen Hernandez MD      ondansetron  4 mg Intravenous Q6H PRN Carmen Hernandez MD      pantoprazole  40 mg Intravenous Q12H Gopal Brasher PA-C      polyethylene glycol  17 g Oral BID Carmen Hernandez MD      temazepam  30 mg Oral HS PRN Carmen Hernandez MD      Vitamin B-2  400 mg Oral Daily Carmen Hernandez MD          Today, Patient Was Seen By: Jinny Mendoza PA-C    ** Please Note: Dictation voice to text software may have been used in the creation of this document   **

## 2021-03-10 NOTE — ASSESSMENT & PLAN NOTE
Patient admits to 15 diarrheal episodes over the past 24 hours prior to admission  History of IBD  Enteric panel, C diff, and other stool studies per GI ordered- specimen not collected on admission given diarrhea had resolved  C diff therefore cancelled  Formed stool sample collected 3/7  Initially held off on antibiotics and steroids pending stool specimen needing to be collected  Stool Enteric Bacterial Panel- negative  Fecal calprotectin- pending  Ova and parasite, and Fecal leukocytes- negative   Continue IV steroids  GI consultation appreciated

## 2021-03-10 NOTE — PROGRESS NOTES
Progress Note- Ellie Pacheco 48 y o  female MRN: [id number]    Unit/Bed#: 425-01 Encounter: [id number]      Assessment and Plan:    48y o  year old female with a PMHx of rheumatoid arthritis, pancreatitis, heart murmur, generalized anxiety disorder, hypothyroidism, chronic kidney disease asthma, anxiety, sphincter of yomi dysfunction, IBD, and mast cell activation syndrome who presented with RUQ pain, nausea, vomiting, and diarrhea      1  Sphincter of yomi dysfunction  2  RUQ pain  3  IBD   The patients nausea, vomiting, and diarrhea has resolved  She does have continued RUQ pain which feels similar to her chronic RUQ pain  CT concerning for colitis  Infectious studies negative  RUQ U/S normal  EGD and coloscopy yesterday concerning for three large linear aphthous appearing ulcers in the sigmoid colon suggestive of Crohn's disease or ischemia  - patient inquires about HIDA or MRCP, discussed as she has no gallbladder and LFTs are normal so these tests would provide no benefit  - believe her RUQ pain is due to her IBD, continue IV steroids   - supportive measures     ______________________________________________________________________    Subjective:     EGD and colonoscopy yesterday    EGD normal  Colonoscopy with three large linear aphthous appearing ulcers in the sigmoid colon approximately 45 cm from the anal verge suggestive of Crohn's disease or ischemia    The patient continues with complaints of RUQ pain   No diarrhea, not rectal bleeding       Medication Administration - last 24 hours from 03/09/2021 1459 to 03/10/2021 1459       Date/Time Order Dose Route Action Action by     03/09/2021 2111 busPIRone (BUSPAR) tablet 5 mg 5 mg Oral Refused Renata Serrano RN     03/10/2021 1313 heparin (porcine) subcutaneous injection 5,000 Units 5,000 Units Subcutaneous Not Given Samantha Elkins RN     03/10/2021 0530 heparin (porcine) subcutaneous injection 5,000 Units 5,000 Units Subcutaneous Refused Renata Serrano RN 03/09/2021 2111 heparin (porcine) subcutaneous injection 5,000 Units 5,000 Units Subcutaneous Refused Gita Rear, RN     03/10/2021 0039 sodium chloride 0 9 % infusion 75 mL/hr Intravenous [address] Gita Rear, RN     03/10/2021 1449 LORazepam (ATIVAN) injection 0 5 mg 0 5 mg Intravenous Given Nancy Esposito, RN     03/10/2021 1017 LORazepam (ATIVAN) injection 0 5 mg 0 5 mg Intravenous Given Sharl Huguenin, RN     03/10/2021 0549 LORazepam (ATIVAN) injection 0 5 mg 0 5 mg Intravenous Given Gita Rear, RN     03/10/2021 0150 LORazepam (ATIVAN) injection 0 5 mg 0 5 mg Intravenous Given Gita Rear, RN     03/09/2021 2056 LORazepam (ATIVAN) injection 0 5 mg 0 5 mg Intravenous Given Gita Rear, RN     03/10/2021 1344 ondansetron (ZOFRAN) injection 4 mg 4 mg Intravenous Given Sharl Huguenin, RN     03/10/2021 0942 ondansetron (ZOFRAN) injection 4 mg 4 mg Intravenous Given Sharl Huguenin, RN     03/10/2021 0150 ondansetron (ZOFRAN) injection 4 mg 4 mg Intravenous Given Gita Rear, RN     03/10/2021 1344 diphenhydrAMINE (BENADRYL) injection 50 mg 50 mg Intravenous Given Sharl Huguenin, RN     03/10/2021 7866 diphenhydrAMINE (BENADRYL) injection 50 mg 50 mg Intravenous Given Sharl Huguenin, RN     03/10/2021 0525 diphenhydrAMINE (BENADRYL) injection 50 mg 50 mg Intravenous Given Margaret Rakos, RN     03/10/2021 0100 diphenhydrAMINE (BENADRYL) injection 50 mg 50 mg Intravenous Given Gita Rear, RN     03/09/2021 2057 diphenhydrAMINE (BENADRYL) injection 50 mg 50 mg Intravenous Given Gita Rear, RN     03/09/2021 1638 diphenhydrAMINE (BENADRYL) injection 50 mg 50 mg Intravenous Given Germán Shake, RN     03/10/2021 0827 cetirizine (ZyrTEC) tablet 10 mg 10 mg Oral Given Claus Muhammad, ROLLY     03/10/2021 1086 Vitamin B-2 TABS 400 mg 400 mg Oral Not Given Claus Muhammad, ROLLY     03/10/2021 0820 liothyronine (CYTOMEL) tablet 5 mcg 5 mcg Oral Not Given Claus Muhammad, ROLLY     03/10/2021 6466 levothyroxine tablet 50 mcg 50 mcg Oral Given Nikky Denny, RN     03/10/2021 7073 calcium citrate (CALCITRATE) tablet 950 mg 950 mg Oral Not Given [place] Kamlesh, RN     03/10/2021 0828 montelukast (SINGULAIR) tablet 10 mg 10 mg Oral Given [place] Kamlesh, RN     03/09/2021 2057 montelukast (SINGULAIR) tablet 10 mg 10 mg Oral Given Nikky Denny, RN     03/10/2021 8018 ergocalciferol (VITAMIN D2) capsule 50,000 Units 50,000 Units Oral Given [facility], RN     03/10/2021 0401 diclofenac (VOLTAREN) EC tablet 75 mg 75 mg Oral Not Given [place] Kamlesh, RN     03/09/2021 1753 diclofenac (VOLTAREN) EC tablet 75 mg 75 mg Oral Not Given Kathy Gudino, RN     03/10/2021 1131 cromolyn (GASTROCROM) solution 200 mg 200 mg Oral Given [place] Kamlesh, RN     03/10/2021 0610 cromolyn (GASTROCROM) solution 200 mg 200 mg Oral Refused Nikky Eduin, RN     03/09/2021 2111 cromolyn (GASTROCROM) solution 200 mg 200 mg Oral Refused Nikky Denny, RN     03/09/2021 1634 cromolyn (GASTROCROM) solution 200 mg 200 mg Oral Not Given Kathy Gudino, RN     03/10/2021 0827 cholecalciferol (VITAMIN D) oral liquid 1,200 Units 1,200 Units Oral Given [place] Kamlesh, RN     03/10/2021 8285 methylPREDNISolone sodium succinate (Solu-MEDROL) injection 40 mg 40 mg [facility]ous [facility]     03/09/2021 2056 methylPREDNISolone sodium succinate (Solu-MEDROL) injection 40 mg 40 mg Intravenous Given Nikky Denny, RN     03/10/2021 1449 HYDROmorphone (DILAUDID) injection 0 5 mg 0 5 mg Intravenous Given Selma Stewart RN     03/10/2021 1018 HYDROmorphone (DILAUDID) injection 0 5 mg 0 5 mg [facility]venous [facility]     03/10/2021 0549 HYDROmorphone (DILAUDID) injection 0 5 mg 0 5 mg Intravenous Given Nikky Denny RN     03/10/2021 0100 HYDROmorphone (DILAUDID) injection 0 5 mg 0 5 mg Intravenous Given Nikky Denny RN     03/09/2021 2056 HYDROmorphone (DILAUDID) injection 0 5 mg 0 5 mg Intravenous Given [facility] ROLLY Simon     03/09/2021 1638 HYDROmorphone (DILAUDID) injection 0 5 mg 0 5 mg Intravenous Given Taylor Arenas RN     03/10/2021 0836 polyethylene glycol (MIRALAX) packet 17 g 17 g Oral Given Samantha Elkins RN     03/09/2021 1801 polyethylene glycol (MIRALAX) packet 17 g 17 g Oral Given Taylor Arenas RN     03/10/2021 1127 potassium phosphate 9 mmol in sodium chloride 0 9 % 100 mL Infusion 9 mmol Intravenous [address] [facility]     03/10/2021 1447 sodium chloride 0 9 % bolus 500 mL 500 mL Intravenous [address] Maximus Kim RN     03/10/2021 1449 lactated ringers infusion 75 mL/hr Intravenous [address] Maximus Kim RN     03/10/2021 1456 pantoprazole (PROTONIX) injection 40 mg 40 mg Intravenous Given Maximus Kim RN          Objective:     Vitals: Blood pressure 96/53, pulse 55, temperature 98 3 °F (36 8 °C), temperature source Oral, resp  rate 16, height 5' 5 25" (1 657 m), weight 65 9 kg (145 lb 4 5 oz), SpO2 95 %, not currently breastfeeding  ,Body mass index is 23 99 kg/m²  Intake/Output Summary (Last 24 hours) at 3/10/2021 1459  Last data filed at 3/10/2021 1330  Gross per 24 hour   Intake 720 ml   Output --   Net 720 ml       Physical Exam:   General Appearance: Awake and alert, in no acute distress  Abdomen: Soft, non-tender, non-distended; bowel sounds normal; no masses or no organomegaly    Invasive Devices     Peripheral Intravenous Line            Peripheral IV 03/09/21 Left Wrist 1 day                Lab Results:  No results displayed because visit has over 200 results  Imaging Studies: I have personally reviewed pertinent imaging studies

## 2021-03-11 ENCOUNTER — HOSPITAL ENCOUNTER (OUTPATIENT)
Dept: INFUSION CENTER | Facility: CLINIC | Age: 46
End: 2021-03-11

## 2021-03-11 PROBLEM — E87.1 HYPONATREMIA: Status: ACTIVE | Noted: 2021-03-11

## 2021-03-11 LAB
ALBUMIN SERPL BCP-MCNC: 2.9 G/DL (ref 3.5–5)
ALP SERPL-CCNC: 48 U/L (ref 46–116)
ALT SERPL W P-5'-P-CCNC: 42 U/L (ref 12–78)
ANION GAP SERPL CALCULATED.3IONS-SCNC: 5 MMOL/L (ref 4–13)
AST SERPL W P-5'-P-CCNC: 16 U/L (ref 5–45)
BASOPHILS # BLD AUTO: 0.01 THOUSANDS/ΜL (ref 0–0.1)
BASOPHILS NFR BLD AUTO: 0 % (ref 0–1)
BILIRUB SERPL-MCNC: 0.2 MG/DL (ref 0.2–1)
BUN SERPL-MCNC: 14 MG/DL (ref 5–25)
CALCIUM ALBUM COR SERPL-MCNC: 9.5 MG/DL (ref 8.3–10.1)
CALCIUM SERPL-MCNC: 8.6 MG/DL (ref 8.3–10.1)
CALPROTECTIN STL-MCNT: 811 UG/G (ref 0–120)
CHLORIDE SERPL-SCNC: 102 MMOL/L (ref 100–108)
CO2 SERPL-SCNC: 28 MMOL/L (ref 21–32)
CREAT SERPL-MCNC: 0.97 MG/DL (ref 0.6–1.3)
EOSINOPHIL # BLD AUTO: 0 THOUSAND/ΜL (ref 0–0.61)
EOSINOPHIL NFR BLD AUTO: 0 % (ref 0–6)
ERYTHROCYTE [DISTWIDTH] IN BLOOD BY AUTOMATED COUNT: 14 % (ref 11.6–15.1)
GFR SERPL CREATININE-BSD FRML MDRD: 69 ML/MIN/1.73SQ M
GLUCOSE SERPL-MCNC: 127 MG/DL (ref 65–140)
HCT VFR BLD AUTO: 38.4 % (ref 34.8–46.1)
HGB BLD-MCNC: 12.2 G/DL (ref 11.5–15.4)
IMM GRANULOCYTES # BLD AUTO: 0.05 THOUSAND/UL (ref 0–0.2)
IMM GRANULOCYTES NFR BLD AUTO: 0 % (ref 0–2)
LYMPHOCYTES # BLD AUTO: 0.83 THOUSANDS/ΜL (ref 0.6–4.47)
LYMPHOCYTES NFR BLD AUTO: 7 % (ref 14–44)
MAGNESIUM SERPL-MCNC: 1.8 MG/DL (ref 1.6–2.6)
MCH RBC QN AUTO: 30.6 PG (ref 26.8–34.3)
MCHC RBC AUTO-ENTMCNC: 31.8 G/DL (ref 31.4–37.4)
MCV RBC AUTO: 96 FL (ref 82–98)
MONOCYTES # BLD AUTO: 0.56 THOUSAND/ΜL (ref 0.17–1.22)
MONOCYTES NFR BLD AUTO: 5 % (ref 4–12)
NEUTROPHILS # BLD AUTO: 10.07 THOUSANDS/ΜL (ref 1.85–7.62)
NEUTS SEG NFR BLD AUTO: 88 % (ref 43–75)
NRBC BLD AUTO-RTO: 0 /100 WBCS
PHOSPHATE SERPL-MCNC: 3 MG/DL (ref 2.7–4.5)
PLATELET # BLD AUTO: 465 THOUSANDS/UL (ref 149–390)
PMV BLD AUTO: 10 FL (ref 8.9–12.7)
POTASSIUM SERPL-SCNC: 3.6 MMOL/L (ref 3.5–5.3)
PROT SERPL-MCNC: 6.6 G/DL (ref 6.4–8.2)
RBC # BLD AUTO: 3.99 MILLION/UL (ref 3.81–5.12)
SODIUM SERPL-SCNC: 135 MMOL/L (ref 136–145)
WBC # BLD AUTO: 11.52 THOUSAND/UL (ref 4.31–10.16)

## 2021-03-11 PROCEDURE — 84100 ASSAY OF PHOSPHORUS: CPT | Performed by: PHYSICIAN ASSISTANT

## 2021-03-11 PROCEDURE — 85025 COMPLETE CBC W/AUTO DIFF WBC: CPT | Performed by: PHYSICIAN ASSISTANT

## 2021-03-11 PROCEDURE — 99232 SBSQ HOSP IP/OBS MODERATE 35: CPT | Performed by: PHYSICIAN ASSISTANT

## 2021-03-11 PROCEDURE — C9113 INJ PANTOPRAZOLE SODIUM, VIA: HCPCS | Performed by: PHYSICIAN ASSISTANT

## 2021-03-11 PROCEDURE — 80053 COMPREHEN METABOLIC PANEL: CPT | Performed by: PHYSICIAN ASSISTANT

## 2021-03-11 PROCEDURE — 83735 ASSAY OF MAGNESIUM: CPT | Performed by: PHYSICIAN ASSISTANT

## 2021-03-11 RX ORDER — LORAZEPAM 2 MG/ML
0.5 INJECTION INTRAMUSCULAR ONCE
Status: COMPLETED | OUTPATIENT
Start: 2021-03-11 | End: 2021-03-12

## 2021-03-11 RX ORDER — ONDANSETRON 4 MG/1
4 TABLET, ORALLY DISINTEGRATING ORAL EVERY 6 HOURS PRN
Status: DISCONTINUED | OUTPATIENT
Start: 2021-03-11 | End: 2021-03-12 | Stop reason: HOSPADM

## 2021-03-11 RX ORDER — ACETAMINOPHEN 325 MG/1
650 TABLET ORAL EVERY 6 HOURS PRN
Status: DISCONTINUED | OUTPATIENT
Start: 2021-03-11 | End: 2021-03-11

## 2021-03-11 RX ORDER — SUCRALFATE 1 G/1
1 TABLET ORAL
Status: DISCONTINUED | OUTPATIENT
Start: 2021-03-11 | End: 2021-03-12 | Stop reason: HOSPADM

## 2021-03-11 RX ORDER — PREDNISONE 20 MG/1
40 TABLET ORAL DAILY
Status: DISCONTINUED | OUTPATIENT
Start: 2021-03-11 | End: 2021-03-11

## 2021-03-11 RX ORDER — PREDNISONE 20 MG/1
40 TABLET ORAL DAILY
Status: DISCONTINUED | OUTPATIENT
Start: 2021-03-11 | End: 2021-03-12 | Stop reason: HOSPADM

## 2021-03-11 RX ORDER — LORAZEPAM 0.5 MG/1
0.5 TABLET ORAL EVERY 8 HOURS PRN
Status: DISCONTINUED | OUTPATIENT
Start: 2021-03-11 | End: 2021-03-12

## 2021-03-11 RX ORDER — TRAMADOL HYDROCHLORIDE 50 MG/1
50 TABLET ORAL EVERY 6 HOURS PRN
Status: DISCONTINUED | OUTPATIENT
Start: 2021-03-11 | End: 2021-03-12 | Stop reason: HOSPADM

## 2021-03-11 RX ORDER — OXYCODONE HYDROCHLORIDE 5 MG/1
5 TABLET ORAL EVERY 6 HOURS PRN
Status: DISCONTINUED | OUTPATIENT
Start: 2021-03-11 | End: 2021-03-11

## 2021-03-11 RX ADMIN — PANTOPRAZOLE SODIUM 40 MG: 40 INJECTION, POWDER, LYOPHILIZED, FOR SOLUTION INTRAVENOUS at 21:02

## 2021-03-11 RX ADMIN — ACETAMINOPHEN 500 MG: 500 CAPSULE, LIQUID FILLED ORAL at 18:04

## 2021-03-11 RX ADMIN — POLYETHYLENE GLYCOL 3350 17 G: 17 POWDER, FOR SOLUTION ORAL at 08:49

## 2021-03-11 RX ADMIN — TRAMADOL HYDROCHLORIDE 50 MG: 50 TABLET, FILM COATED ORAL at 23:26

## 2021-03-11 RX ADMIN — LEVOTHYROXINE SODIUM 50 MCG: 0.05 TABLET ORAL at 05:44

## 2021-03-11 RX ADMIN — HYDROXYZINE HYDROCHLORIDE 25 MG: 25 TABLET, FILM COATED ORAL at 21:02

## 2021-03-11 RX ADMIN — LORAZEPAM 0.5 MG: 2 INJECTION INTRAMUSCULAR; INTRAVENOUS at 15:01

## 2021-03-11 RX ADMIN — SODIUM CHLORIDE, SODIUM LACTATE, POTASSIUM CHLORIDE, AND CALCIUM CHLORIDE 75 ML/HR: .6; .31; .03; .02 INJECTION, SOLUTION INTRAVENOUS at 03:03

## 2021-03-11 RX ADMIN — MONTELUKAST 10 MG: 10 TABLET, FILM COATED ORAL at 21:02

## 2021-03-11 RX ADMIN — POLYETHYLENE GLYCOL 3350 17 G: 17 POWDER, FOR SOLUTION ORAL at 18:04

## 2021-03-11 RX ADMIN — METHYLPREDNISOLONE SODIUM SUCCINATE 40 MG: 40 INJECTION, POWDER, FOR SOLUTION INTRAMUSCULAR; INTRAVENOUS at 08:54

## 2021-03-11 RX ADMIN — CETIRIZINE HYDROCHLORIDE 10 MG: 10 TABLET, FILM COATED ORAL at 08:50

## 2021-03-11 RX ADMIN — LORAZEPAM 0.5 MG: 2 INJECTION INTRAMUSCULAR; INTRAVENOUS at 08:51

## 2021-03-11 RX ADMIN — DIPHENHYDRAMINE HYDROCHLORIDE 50 MG: 50 INJECTION, SOLUTION INTRAMUSCULAR; INTRAVENOUS at 18:04

## 2021-03-11 RX ADMIN — DIPHENHYDRAMINE HYDROCHLORIDE 50 MG: 50 INJECTION, SOLUTION INTRAMUSCULAR; INTRAVENOUS at 23:07

## 2021-03-11 RX ADMIN — HYDROMORPHONE HYDROCHLORIDE 0.5 MG: 1 INJECTION, SOLUTION INTRAMUSCULAR; INTRAVENOUS; SUBCUTANEOUS at 04:12

## 2021-03-11 RX ADMIN — LORAZEPAM 0.5 MG: 2 INJECTION INTRAMUSCULAR; INTRAVENOUS at 04:11

## 2021-03-11 RX ADMIN — PREDNISONE 40 MG: 20 TABLET ORAL at 18:05

## 2021-03-11 RX ADMIN — PANTOPRAZOLE SODIUM 40 MG: 40 INJECTION, POWDER, LYOPHILIZED, FOR SOLUTION INTRAVENOUS at 08:53

## 2021-03-11 RX ADMIN — MONTELUKAST 10 MG: 10 TABLET, FILM COATED ORAL at 08:50

## 2021-03-11 RX ADMIN — HYDROMORPHONE HYDROCHLORIDE 0.5 MG: 1 INJECTION, SOLUTION INTRAMUSCULAR; INTRAVENOUS; SUBCUTANEOUS at 09:58

## 2021-03-11 RX ADMIN — Medication 1200 UNITS: at 08:51

## 2021-03-11 RX ADMIN — DIPHENHYDRAMINE HYDROCHLORIDE 50 MG: 50 INJECTION, SOLUTION INTRAMUSCULAR; INTRAVENOUS at 08:57

## 2021-03-11 RX ADMIN — DIPHENHYDRAMINE HYDROCHLORIDE 50 MG: 50 INJECTION, SOLUTION INTRAMUSCULAR; INTRAVENOUS at 03:02

## 2021-03-11 RX ADMIN — HYDROMORPHONE HYDROCHLORIDE 0.5 MG: 1 INJECTION, SOLUTION INTRAMUSCULAR; INTRAVENOUS; SUBCUTANEOUS at 13:58

## 2021-03-11 RX ADMIN — SUCRALFATE 1 G: 1 TABLET ORAL at 21:02

## 2021-03-11 RX ADMIN — ONDANSETRON 4 MG: 2 INJECTION INTRAMUSCULAR; INTRAVENOUS at 09:57

## 2021-03-11 RX ADMIN — DIPHENHYDRAMINE HYDROCHLORIDE 50 MG: 50 INJECTION, SOLUTION INTRAMUSCULAR; INTRAVENOUS at 13:48

## 2021-03-11 NOTE — NURSING NOTE
Pt with multiple complaints regarding her medications and administration  Requesting to speak with supervisor  ROLLY Campbell supervisor did spend large amount of time with pt in room  Pt was switched from IV meds to PO to prepare for discharge  Pt did refuse new ordered med carafate  I had pharmacy review and label her tylenol and prednisone as she cannot take our brand (per patient)  Pt self administers her weekly immunoglobulin  Pt then called me in the room later in the evening stating she had a BM with blood in it  I did inspect the stool, small soft brown stool  There did not appear to be any blood but I let the provider, Sameer, aware of same in case a sample is needed  Pt stated she did not need any pain medication at this time, only needed IV benadryl and tylenol prior to her immunoglobulin treatment  Pt's VSS remain stable, she is ambulatory in the room and seems to be in good spirits at this time  Advised her to call for any assistance that is needed  Pt verbalized understanding

## 2021-03-11 NOTE — RESULT ENCOUNTER NOTE
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

## 2021-03-11 NOTE — ASSESSMENT & PLAN NOTE
Uncertain if crohn's vs  UC based on GI notes  On IV Stelara infusions - drug level is pending  Initially held IV steroids pending stool sample collection, need to rule out bacterial colitis, however, given discontinuation of diarrhea for several days, resolution of leukocytosis, and afebrile state, patient was started on IV steroids 3/8/21  Received IV solumedrol 60mg once on 3/7 in AM, then 40mg IV BID dosing - will transition to PO prednisone to start first dose this afternoon      Colonoscopy and upper endoscopy performed 3/9:    EGD: IMPRESSION:  Normal upper endoscopy   RECOMMENDATION:  Await pathology results  Continue current medications  Colonoscopy to follow    Colonoscopy:   IMPRESSION:  Three large linear aphthous appearing ulcers in the sigmoid colon approximately 45 cm from the anal verge suggestive of Crohn's disease or ischemia  Unfortunately I was unable to enter the terminal ileum  RECOMMENDATION:  Repeat in 1 year due to a personal history of colon polyps and for IBD surveillance  Continue current medications

## 2021-03-11 NOTE — ASSESSMENT & PLAN NOTE
Continue Buspar, atarax, temazepam   Patient reports can only take her own home prescription pills-currently will provide Ativan 0 5 p r n   As needed for anxiety

## 2021-03-11 NOTE — PROGRESS NOTES
[address]  Progress Note - Gina Hutton 1975, 48 y o  female MRN: [id number]  Unit/Bed#: 425-01 Encounter: [id number]  Primary Care Provider: No primary care provider on file  Date and time admitted to hospital: 3/3/2021  1:26 PM    * Abdominal pain  Assessment & Plan  Patient admits to right upper quadrant pain accompanied with nausea vomiting and 15 diarrheal episodes on the day prior to admission  Stool studies ordered  Formed stool collected 3/7/21  Stool Enteric Bacterial Panel- negative   Fecal calprotectin- pending   Ova and parasite- negative   Fecal leukocytes- negative    CT abdomen/pelvis: Bowel wall thickening of the descending colon extending to the rectum suggestive of colitis of an infectious/inflammatory etiology  2 6 cm fluid attenuation structure in the right adnexal region may represent an ovarian cyst in the absence of a history of oophorectomy  Transvaginal US: Limited visualization of a complex-appearing cystic structure in the right ovary measuring 2 6 cm  Recommend follow-up pelvic ultrasound in 6-12 weeks  Obstruction series - negative  Liver function testing and lipase - normal     US RUQ: Unremarkable right upper quadrant exam status post cholecystectomy  Colonoscopy:  IMPRESSION:  Three large linear aphthous appearing ulcers in the sigmoid colon approximately 45 cm from the anal verge suggestive of Crohn's disease or ischemia     Unfortunately I was unable to enter the terminal ileum        RECOMMENDATION:  Repeat in 5 years due to a personal history of colon polyps  Await pathology results  Continue current medications    EGD:   IMPRESSION:  Normal upper endoscopy      RECOMMENDATION:  Await pathology results  Continue current medications  Colonoscopy to follow    3/11 - Ongoing pain, worse with food intake  Only tolerating clear liquid diet, but no further N/V  No diarrhea   Will transition from IV dilaudid to PO oxycodone, and IV ativan to PO ativan in preparation for discharge  Will trial carafate for ongoing pain  Notes protonix is helping  Hyponatremia  Assessment & Plan  Mild, 135  Tolerating PO fluids  Will discontinue IV fluids and follow up labs tomorrow morning  Inflammatory bowel disease  Assessment & Plan  Uncertain if crohn's vs  UC based on GI notes  On IV Stelara infusions - drug level is pending  Initially held IV steroids pending stool sample collection, need to rule out bacterial colitis, however, given discontinuation of diarrhea for several days, resolution of leukocytosis, and afebrile state, patient was started on IV steroids 3/8/21  Received IV solumedrol 60mg once on 3/7 in AM, then 40mg IV BID dosing - will transition to PO prednisone to start first dose this afternoon  Colonoscopy and upper endoscopy performed 3/9:    EGD: IMPRESSION:  Normal upper endoscopy   RECOMMENDATION:  Await pathology results  Continue current medications  Colonoscopy to follow    Colonoscopy:   IMPRESSION:  Three large linear aphthous appearing ulcers in the sigmoid colon approximately 45 cm from the anal verge suggestive of Crohn's disease or ischemia  Unfortunately I was unable to enter the terminal ileum  RECOMMENDATION:  Repeat in 1 year due to a personal history of colon polyps and for IBD surveillance  Continue current medications           Thrombocytosis (HCC)  Assessment & Plan  platelet count of 643 today  Platelet count of 388 on admission  Trend CBC daily    Mast cell activation syndrome [facility])  Assessment & Plan  Patient is seen/managed by physician in [facility]   -  Dr Nanette Villegas at [facility] - [phone]  Spoke with allergist on-call 3/8/21 who noted the patient's symptoms who recommended Steroid taper, no need for transfer  Will attempt to transition from IV steroids to PO steroids  continue with IV Benadryl as needed   Avoid known triggers          Leukocytosis  Assessment & Plan  As evidenced by white blood cell count of 10 61 on admission, peaked at 13 40- now increased slightly to 11 52 in the setting of IV steroids      Patient with known mast cell activation and IBD utilizing home steroids  Trend CBC daily        Hypothyroidism (acquired)  Assessment & Plan  Patient reportedly not taking levothyroxine for last several weeks  Patient currently refusing this  Generalized anxiety disorder  Assessment & Plan  Continue Buspar, atarax, temazepam   Patient reports can only take her own home prescription pills-currently will provide Ativan 0 5 p r n  As needed for anxiety      Hypokalemia  Assessment & Plan  Potassium level 3 2 on admission  Resolved since receiving IV Potassium 40 mEq  Trend potassium q d  Replete as needed    Diarrhea  Assessment & Plan  Now resolved  See plan for "Abdominal pain"        VTE Pharmacologic Prophylaxis:   Pharmacologic: Heparin  Mechanical VTE Prophylaxis in Place: Yes    Patient Centered Rounds: I have performed bedside rounds with nursing staff today  Discussions with Specialists or Other Care Team Provider: discussed with GI        Time Spent for Care: 30 minutes  More than 50% of total time spent on counseling and coordination of care as described above  Current Length of Stay: 8 day(s)    Current Patient Status: Inpatient   Certification Statement: The patient will continue to require additional inpatient hospital stay due to need for close monitoring of labs, GI follow up  Discharge Plan / Estimated Discharge Date: TBD      Code Status: Level 1 - Full Code      Subjective:   Patient notes     Objective:   Vitals:   Temp (24hrs), Av 5 °F (36 9 °C), Min:98 2 °F (36 8 °C), Max:98 6 °F (37 °C)    Temp:  [98 2 °F (36 8 °C)-98 6 °F (37 °C)] 98 2 °F (36 8 °C)  HR:  [61] 61  Resp:  [18] 18  BP: (105-125)/(67-79) 105/67  SpO2:  [95 %] 95 %  Body mass index is 23 99 kg/m²  Input and Output Summary (last 24 hours):      Intake/Output Summary (Last 24 hours) at 3/11/2021 1606  Last data filed at 3/11/2021 0929  Gross per 24 hour   Intake 240 ml   Output --   Net 240 ml       Physical Exam:   Physical Exam  Vitals signs and nursing note reviewed  Constitutional:       General: She is not in acute distress  Appearance: She is not ill-appearing  HENT:      Head: Normocephalic  Mouth/Throat:      Mouth: Mucous membranes are dry  Cardiovascular:      Rate and Rhythm: Normal rate and regular rhythm  Heart sounds: No murmur  Pulmonary:      Effort: Pulmonary effort is normal       Breath sounds: Normal breath sounds  Abdominal:      Tenderness: There is abdominal tenderness (epigastric)  Musculoskeletal:      Right lower leg: No edema  Left lower leg: No edema  Skin:     General: Skin is warm and dry  Neurological:      Mental Status: She is alert and oriented to person, place, and time  Psychiatric:         Mood and Affect: Mood normal            Additional Data:   Labs:    Results from last 7 days   Lab Units 03/11/21  0425   WBC Thousand/uL 11 52*   HEMOGLOBIN g/dL 12 2   HEMATOCRIT % 38 4   PLATELETS Thousands/uL 465*   NEUTROS PCT % 88*   LYMPHS PCT % 7*   MONOS PCT % 5   EOS PCT % 0     Results from last 7 days   Lab Units 03/11/21  0425   POTASSIUM mmol/L 3 6   CHLORIDE mmol/L 102   CO2 mmol/L 28   BUN mg/dL 14   CREATININE mg/dL 0 97   CALCIUM mg/dL 8 6   ALK PHOS U/L 48   ALT U/L 42   AST U/L 16            * I Have Reviewed All Lab Data Listed Above  * Additional Pertinent Lab Tests Reviewed: All Labs Within Last 24 Hours Reviewed    Imaging:  Imaging Reports Reviewed Today Include: no new imaging to review          Recent Cultures (last 7 days):         Last 24 Hours Medication List:   Current Facility-Administered Medications   Medication Dose Route Frequency Provider Last Rate    busPIRone  5 mg Oral Q24H Deidre Min MD      calcium citrate  950 mg Oral Daily Deidre Min MD      cetirizine  10 mg Oral Daily Lakeshia Carl MD      cholecalciferol  1,200 Units Oral Daily Lakeshia Carl MD      cromolyn  200 mg Oral 4x Daily [facility] Lakeshia Carl MD      diclofenac  75 mg Oral BID Lakeshia Carl MD      diphenhydrAMINE  50 mg Intravenous Q4H PRN Lakeshia Carl MD      ergocalciferol  50,000 Units Oral Weekly Lakeshia Carl MD      heparin (porcine)  5,000 Units Subcutaneous [facility] Lakeshia Carl MD      hydrOXYzine HCL  25 mg Oral Q6H PRN Lakeshia Carl MD      levothyroxine  50 mcg Oral Early Morning Lakeshia Carl MD      liothyronine  5 mcg Oral Daily Lakeshia Carl MD      LORazepam  0 5 mg Oral Q8H PRN Ricardo England PA-C      montelukast  10 mg Oral BID Lakeshia Carl MD      ondansetron  4 mg Intravenous Q6H PRN Lakeshia Carl MD      oxyCODONE  5 mg Oral Q6H PRN Ricardo England PA-C      pantoprazole  40 mg Intravenous Q12H [address] Khushbu RANDEE Fitch      polyethylene glycol  17 g Oral BID Lakeshia Carl MD      predniSONE  40 mg Oral Daily Ricardo England PA-C      sucralfate  1 g Oral 4x Daily (AC & HS) Ricardo England PA-C      temazepam  30 mg Oral HS PRN Lakeshia Carl MD      Vitamin B-2  400 mg Oral Daily Lakeshia Carl MD          Today, Patient Was Seen By: Ricardo England PA-C    ** Please Note: Dragon 360 Dictation voice to text software may have been used in the creation of this document   **

## 2021-03-11 NOTE — ASSESSMENT & PLAN NOTE
Patient admits to right upper quadrant pain accompanied with nausea vomiting and 15 diarrheal episodes on the day prior to admission  Stool studies ordered  Formed stool collected 3/7/21  Stool Enteric Bacterial Panel- negative   Fecal calprotectin- pending   Ova and parasite- negative   Fecal leukocytes- negative    CT abdomen/pelvis: Bowel wall thickening of the descending colon extending to the rectum suggestive of colitis of an infectious/inflammatory etiology  2 6 cm fluid attenuation structure in the right adnexal region may represent an ovarian cyst in the absence of a history of oophorectomy  Transvaginal US: Limited visualization of a complex-appearing cystic structure in the right ovary measuring 2 6 cm  Recommend follow-up pelvic ultrasound in 6-12 weeks  Obstruction series - negative  Liver function testing and lipase - normal     US RUQ: Unremarkable right upper quadrant exam status post cholecystectomy  Colonoscopy:  IMPRESSION:  Three large linear aphthous appearing ulcers in the sigmoid colon approximately 45 cm from the anal verge suggestive of Crohn's disease or ischemia     Unfortunately I was unable to enter the terminal ileum        RECOMMENDATION:  Repeat in 5 years due to a personal history of colon polyps  Await pathology results  Continue current medications    EGD:   IMPRESSION:  Normal upper endoscopy      RECOMMENDATION:  Await pathology results  Continue current medications  Colonoscopy to follow    3/11 - Ongoing pain, worse with food intake  Only tolerating clear liquid diet, but no further N/V  No diarrhea  Will transition from IV dilaudid to PO oxycodone, and IV ativan to PO ativan in preparation for discharge  Will trial carafate for ongoing pain  Notes protonix is helping

## 2021-03-11 NOTE — ASSESSMENT & PLAN NOTE
As evidenced by white blood cell count of 10 61 on admission, peaked at 13 40- now increased slightly to 11 52 in the setting of IV steroids      Patient with known mast cell activation and IBD utilizing home steroids  Trend CBC daily

## 2021-03-11 NOTE — PLAN OF CARE
Problem: Potential for Falls  Goal: Patient will remain free of falls  Description: INTERVENTIONS:  - Assess patient frequently for physical needs  -  Identify cognitive and physical deficits and behaviors that affect risk of falls    -  [place] fall precautions as indicated by assessment   - Educate patient/family on patient safety including physical limitations  - Instruct patient to call for assistance with activity based on assessment  - Modify environment to reduce risk of injury  - Consider OT/PT consult to assist with strengthening/mobility  Outcome: Progressing     Problem: GASTROINTESTINAL - ADULT  Goal: Maintains or returns to baseline bowel function  Description: INTERVENTIONS:  - Assess bowel function  - Encourage oral fluids to ensure adequate hydration  - Administer IV fluids if ordered to ensure adequate hydration  - Administer ordered medications as needed  - Encourage mobilization and activity  - Consider nutritional services referral to assist patient with adequate nutrition and appropriate food choices  Outcome: Progressing  Goal: Maintains adequate nutritional intake  Description: INTERVENTIONS:  - Monitor percentage of each meal consumed  - Identify factors contributing to decreased intake, treat as appropriate  - Assist with meals as needed  - Monitor I&O, weight, and lab values if indicated  - Obtain nutrition services referral as needed  Outcome: Progressing  Goal: Minimal or absence of nausea and/or vomiting  Description: INTERVENTIONS:  - Administer IV fluids if ordered to ensure adequate hydration  - Administer ordered antiemetic medications as needed (PRN Zofran)  - Provide nonpharmacologic comfort measures as appropriate  - Advance diet as tolerated, if ordered  - Consider nutrition services referral to assist patient with adequate nutrition and appropriate food choices  Outcome: Progressing     Problem: METABOLIC, FLUID AND ELECTROLYTES - ADULT  Goal: Electrolytes maintained within normal limits  Description: INTERVENTIONS:  - Monitor labs and assess patient for signs and symptoms of electrolyte imbalances  - Administer electrolyte replacement as ordered  - Monitor response to electrolyte replacements, including repeat lab results as appropriate  - Instruct patient on fluid and nutrition as appropriate  Outcome: Progressing     Problem: PAIN - ADULT  Goal: Verbalizes/displays adequate comfort level or baseline comfort level  Description: Interventions:  - Encourage patient to monitor pain and request assistance  - Assess pain using appropriate pain scale (0-10 pain scale)  - Administer analgesics based on type and severity of pain and evaluate response  - Implement non-pharmacological measures as appropriate and evaluate response  - Consider cultural and social influences on pain and pain management  - Notify physician/advanced practitioner if interventions unsuccessful or patient reports new pain  Outcome: Progressing     Problem: INFECTION - ADULT  Goal: Absence or prevention of progression during hospitalization  Description: INTERVENTIONS:  - Assess and monitor for signs and symptoms of infection  - Monitor lab/diagnostic results  - Monitor all insertion sites, i e  indwelling lines, tubes, and drains  - Monitor endotracheal if appropriate and nasal secretions for changes in amount and color  - [place] appropriate cooling/warming therapies per order  - Administer medications as ordered  - Instruct and encourage patient and family to use good hand hygiene technique  - Identify and instruct in appropriate isolation precautions for identified infection/condition  Outcome: Progressing     Problem: SAFETY ADULT  Goal: Maintain or return to baseline ADL function  Description: INTERVENTIONS:  -  Assess patient's ability to carry out ADLs; assess patient's baseline for ADL function and identify physical deficits which impact ability to perform ADLs (bathing, care of mouth/teeth, toileting, grooming, dressing, etc )  - Assess/evaluate cause of self-care deficits   - Assess range of motion  - Assess patient's mobility; develop plan if impaired  - Assess patient's need for assistive devices and provide as appropriate  - Encourage maximum independence but intervene and supervise when necessary  - Involve family in performance of ADLs  - Assess for home care needs following discharge   - Consider OT consult to assist with ADL evaluation and planning for discharge  - Provide patient education as appropriate  Outcome: Progressing  Goal: Maintain or return mobility status to optimal level  Description: INTERVENTIONS:  - Assess patient's baseline mobility status (ambulation, transfers, stairs, etc )    - Identify cognitive and physical deficits and behaviors that affect mobility  - Identify mobility aids required to assist with transfers and/or ambulation (gait belt, sit-to-stand, lift, walker, cane, etc )  - [place] fall precautions as indicated by assessment  - Record patient progress and toleration of activity level on Mobility SBAR; progress patient to next Phase/Stage  - Instruct patient to call for assistance with activity based on assessment  - Consider rehabilitation consult to assist with strengthening/weightbearing, etc   Outcome: Progressing     Problem: DISCHARGE PLANNING  Goal: Discharge to home or other facility with appropriate resources  Description: INTERVENTIONS:  - Identify barriers to discharge w/patient and caregiver  - Arrange for needed discharge resources and transportation as appropriate  - Identify discharge learning needs (meds, wound care, etc )  - Arrange for interpretive services to assist at discharge as needed  - Refer to Case Management Department for coordinating discharge planning if the patient needs post-hospital services based on physician/advanced practitioner order or complex needs related to functional status, cognitive ability, or social support system  Outcome: Progressing     Problem: Knowledge Deficit  Goal: Patient/family/caregiver demonstrates understanding of disease process, treatment plan, medications, and discharge instructions  Description: Complete learning assessment and assess knowledge base    Interventions:  - Provide teaching at level of understanding  - Provide teaching via preferred learning methods  Outcome: Progressing

## 2021-03-11 NOTE — PLAN OF CARE
Problem: Potential for Falls  Goal: Patient will remain free of falls  Description: INTERVENTIONS:  - Assess patient frequently for physical needs  -  Identify cognitive and physical deficits and behaviors that affect risk of falls    -  [place] fall precautions as indicated by assessment   - Educate patient/family on patient safety including physical limitations  - Instruct patient to call for assistance with activity based on assessment  - Modify environment to reduce risk of injury  - Consider OT/PT consult to assist with strengthening/mobility  Outcome: Progressing     Problem: GASTROINTESTINAL - ADULT  Goal: Maintains or returns to baseline bowel function  Description: INTERVENTIONS:  - Assess bowel function  - Encourage oral fluids to ensure adequate hydration  - Administer IV fluids if ordered to ensure adequate hydration  - Administer ordered medications as needed  - Encourage mobilization and activity  - Consider nutritional services referral to assist patient with adequate nutrition and appropriate food choices  Outcome: Progressing  Goal: Maintains adequate nutritional intake  Description: INTERVENTIONS:  - Monitor percentage of each meal consumed  - Identify factors contributing to decreased intake, treat as appropriate  - Assist with meals as needed  - Monitor I&O, weight, and lab values if indicated  - Obtain nutrition services referral as needed  Outcome: Progressing  Goal: Minimal or absence of nausea and/or vomiting  Description: INTERVENTIONS:  - Administer IV fluids if ordered to ensure adequate hydration  - Administer ordered antiemetic medications as needed (PRN Zofran)  - Provide nonpharmacologic comfort measures as appropriate  - Advance diet as tolerated, if ordered  - Consider nutrition services referral to assist patient with adequate nutrition and appropriate food choices  Outcome: Progressing     Problem: METABOLIC, FLUID AND ELECTROLYTES - ADULT  Goal: Electrolytes maintained within normal limits  Description: INTERVENTIONS:  - Monitor labs and assess patient for signs and symptoms of electrolyte imbalances  - Administer electrolyte replacement as ordered  - Monitor response to electrolyte replacements, including repeat lab results as appropriate  - Instruct patient on fluid and nutrition as appropriate  Outcome: Progressing     Problem: PAIN - ADULT  Goal: Verbalizes/displays adequate comfort level or baseline comfort level  Description: Interventions:  - Encourage patient to monitor pain and request assistance  - Assess pain using appropriate pain scale (0-10 pain scale)  - Administer analgesics based on type and severity of pain and evaluate response  - Implement non-pharmacological measures as appropriate and evaluate response  - Consider cultural and social influences on pain and pain management  - Notify physician/advanced practitioner if interventions unsuccessful or patient reports new pain  Outcome: Progressing     Problem: INFECTION - ADULT  Goal: Absence or prevention of progression during hospitalization  Description: INTERVENTIONS:  - Assess and monitor for signs and symptoms of infection  - Monitor lab/diagnostic results  - Monitor all insertion sites, i e  indwelling lines, tubes, and drains  - Monitor endotracheal if appropriate and nasal secretions for changes in amount and color  - [place] appropriate cooling/warming therapies per order  - Administer medications as ordered  - Instruct and encourage patient and family to use good hand hygiene technique  - Identify and instruct in appropriate isolation precautions for identified infection/condition  Outcome: Progressing     Problem: SAFETY ADULT  Goal: Maintain or return to baseline ADL function  Description: INTERVENTIONS:  -  Assess patient's ability to carry out ADLs; assess patient's baseline for ADL function and identify physical deficits which impact ability to perform ADLs (bathing, care of mouth/teeth, toileting, grooming, dressing, etc )  - Assess/evaluate cause of self-care deficits   - Assess range of motion  - Assess patient's mobility; develop plan if impaired  - Assess patient's need for assistive devices and provide as appropriate  - Encourage maximum independence but intervene and supervise when necessary  - Involve family in performance of ADLs  - Assess for home care needs following discharge   - Consider OT consult to assist with ADL evaluation and planning for discharge  - Provide patient education as appropriate  Outcome: Progressing  Goal: Maintain or return mobility status to optimal level  Description: INTERVENTIONS:  - Assess patient's baseline mobility status (ambulation, transfers, stairs, etc )    - Identify cognitive and physical deficits and behaviors that affect mobility  - Identify mobility aids required to assist with transfers and/or ambulation (gait belt, sit-to-stand, lift, walker, cane, etc )  - [place] fall precautions as indicated by assessment  - Record patient progress and toleration of activity level on Mobility SBAR; progress patient to next Phase/Stage  - Instruct patient to call for assistance with activity based on assessment  - Consider rehabilitation consult to assist with strengthening/weightbearing, etc   Outcome: Progressing     Problem: DISCHARGE PLANNING  Goal: Discharge to home or other facility with appropriate resources  Description: INTERVENTIONS:  - Identify barriers to discharge w/patient and caregiver  - Arrange for needed discharge resources and transportation as appropriate  - Identify discharge learning needs (meds, wound care, etc )  - Arrange for interpretive services to assist at discharge as needed  - Refer to Case Management Department for coordinating discharge planning if the patient needs post-hospital services based on physician/advanced practitioner order or complex needs related to functional status, cognitive ability, or social support system  Outcome: Progressing     Problem: Knowledge Deficit  Goal: Patient/family/caregiver demonstrates understanding of disease process, treatment plan, medications, and discharge instructions  Description: Complete learning assessment and assess knowledge base    Interventions:  - Provide teaching at level of understanding  - Provide teaching via preferred learning methods  Outcome: Progressing

## 2021-03-11 NOTE — ASSESSMENT & PLAN NOTE
Patient is seen/managed by physician in [place]   -  Dr Bernice Remy at [facility] CH[facility] AND [facility]OL[facility]ENT [facility] - [phone]  Spoke with allergist on-call 3/8/21 who noted the patient's symptoms who recommended Steroid taper, no need for transfer  Will attempt to transition from IV steroids to PO steroids  continue with IV Benadryl as needed   Avoid known triggers

## 2021-03-12 VITALS
OXYGEN SATURATION: 95 % | SYSTOLIC BLOOD PRESSURE: 102 MMHG | HEIGHT: 65 IN | RESPIRATION RATE: 17 BRPM | HEART RATE: 61 BPM | WEIGHT: 145.28 LBS | TEMPERATURE: 98.1 F | BODY MASS INDEX: 24.21 KG/M2 | DIASTOLIC BLOOD PRESSURE: 67 MMHG

## 2021-03-12 LAB
ANION GAP SERPL CALCULATED.3IONS-SCNC: 8 MMOL/L (ref 4–13)
BASOPHILS # BLD AUTO: 0.02 THOUSANDS/ΜL (ref 0–0.1)
BASOPHILS NFR BLD AUTO: 0 % (ref 0–1)
BUN SERPL-MCNC: 13 MG/DL (ref 5–25)
CALCIUM SERPL-MCNC: 9.2 MG/DL (ref 8.3–10.1)
CHLORIDE SERPL-SCNC: 101 MMOL/L (ref 100–108)
CO2 SERPL-SCNC: 30 MMOL/L (ref 21–32)
CREAT SERPL-MCNC: 0.78 MG/DL (ref 0.6–1.3)
EOSINOPHIL # BLD AUTO: 0 THOUSAND/ΜL (ref 0–0.61)
EOSINOPHIL NFR BLD AUTO: 0 % (ref 0–6)
ERYTHROCYTE [DISTWIDTH] IN BLOOD BY AUTOMATED COUNT: 13.8 % (ref 11.6–15.1)
GFR SERPL CREATININE-BSD FRML MDRD: 90 ML/MIN/1.73SQ M
GLUCOSE SERPL-MCNC: 126 MG/DL (ref 65–140)
HCT VFR BLD AUTO: 42.6 % (ref 34.8–46.1)
HGB BLD-MCNC: 13.7 G/DL (ref 11.5–15.4)
IMM GRANULOCYTES # BLD AUTO: 0.06 THOUSAND/UL (ref 0–0.2)
IMM GRANULOCYTES NFR BLD AUTO: 0 % (ref 0–2)
LYMPHOCYTES # BLD AUTO: 1.09 THOUSANDS/ΜL (ref 0.6–4.47)
LYMPHOCYTES NFR BLD AUTO: 7 % (ref 14–44)
MCH RBC QN AUTO: 30.5 PG (ref 26.8–34.3)
MCHC RBC AUTO-ENTMCNC: 32.2 G/DL (ref 31.4–37.4)
MCV RBC AUTO: 95 FL (ref 82–98)
MONOCYTES # BLD AUTO: 1.18 THOUSAND/ΜL (ref 0.17–1.22)
MONOCYTES NFR BLD AUTO: 7 % (ref 4–12)
NEUTROPHILS # BLD AUTO: 13.87 THOUSANDS/ΜL (ref 1.85–7.62)
NEUTS SEG NFR BLD AUTO: 86 % (ref 43–75)
NRBC BLD AUTO-RTO: 0 /100 WBCS
PLATELET # BLD AUTO: 487 THOUSANDS/UL (ref 149–390)
PMV BLD AUTO: 9.9 FL (ref 8.9–12.7)
POTASSIUM SERPL-SCNC: 3.6 MMOL/L (ref 3.5–5.3)
RBC # BLD AUTO: 4.49 MILLION/UL (ref 3.81–5.12)
SODIUM SERPL-SCNC: 139 MMOL/L (ref 136–145)
WBC # BLD AUTO: 16.22 THOUSAND/UL (ref 4.31–10.16)

## 2021-03-12 PROCEDURE — 99239 HOSP IP/OBS DSCHRG MGMT >30: CPT | Performed by: PHYSICIAN ASSISTANT

## 2021-03-12 PROCEDURE — 85025 COMPLETE CBC W/AUTO DIFF WBC: CPT | Performed by: PHYSICIAN ASSISTANT

## 2021-03-12 PROCEDURE — C9113 INJ PANTOPRAZOLE SODIUM, VIA: HCPCS | Performed by: PHYSICIAN ASSISTANT

## 2021-03-12 PROCEDURE — 80048 BASIC METABOLIC PNL TOTAL CA: CPT | Performed by: PHYSICIAN ASSISTANT

## 2021-03-12 RX ORDER — PANTOPRAZOLE SODIUM 40 MG/1
40 TABLET, DELAYED RELEASE ORAL DAILY
Qty: 30 TABLET | Refills: 0 | Status: SHIPPED | OUTPATIENT
Start: 2021-03-12 | End: 2021-04-14 | Stop reason: SDUPTHER

## 2021-03-12 RX ORDER — PREDNISONE 20 MG/1
40 TABLET ORAL DAILY
Qty: 30 TABLET | Refills: 0 | Status: SHIPPED | OUTPATIENT
Start: 2021-03-12 | End: 2021-08-23 | Stop reason: SDUPTHER

## 2021-03-12 RX ORDER — TRAMADOL HYDROCHLORIDE 50 MG/1
50 TABLET ORAL EVERY 6 HOURS PRN
Qty: 20 TABLET | Refills: 0 | Status: SHIPPED | OUTPATIENT
Start: 2021-03-12 | End: 2021-03-19

## 2021-03-12 RX ORDER — LORAZEPAM 1 MG/1
1 TABLET ORAL EVERY 8 HOURS PRN
Status: DISCONTINUED | OUTPATIENT
Start: 2021-03-12 | End: 2021-03-12 | Stop reason: HOSPADM

## 2021-03-12 RX ORDER — POLYETHYLENE GLYCOL 3350 17 G/17G
17 POWDER, FOR SOLUTION ORAL DAILY PRN
Qty: 17 G | Refills: 0 | Status: SHIPPED | OUTPATIENT
Start: 2021-03-12 | End: 2021-12-07 | Stop reason: HOSPADM

## 2021-03-12 RX ADMIN — MONTELUKAST 10 MG: 10 TABLET, FILM COATED ORAL at 09:02

## 2021-03-12 RX ADMIN — DIPHENHYDRAMINE HYDROCHLORIDE 50 MG: 50 INJECTION, SOLUTION INTRAMUSCULAR; INTRAVENOUS at 09:05

## 2021-03-12 RX ADMIN — PANTOPRAZOLE SODIUM 40 MG: 40 INJECTION, POWDER, LYOPHILIZED, FOR SOLUTION INTRAVENOUS at 09:03

## 2021-03-12 RX ADMIN — TRAMADOL HYDROCHLORIDE 50 MG: 50 TABLET, FILM COATED ORAL at 12:21

## 2021-03-12 RX ADMIN — Medication 1200 UNITS: at 09:03

## 2021-03-12 RX ADMIN — POLYETHYLENE GLYCOL 3350 17 G: 17 POWDER, FOR SOLUTION ORAL at 09:01

## 2021-03-12 RX ADMIN — DIPHENHYDRAMINE HYDROCHLORIDE 50 MG: 50 INJECTION, SOLUTION INTRAMUSCULAR; INTRAVENOUS at 04:00

## 2021-03-12 RX ADMIN — LORAZEPAM 0.5 MG: 2 INJECTION INTRAMUSCULAR; INTRAVENOUS at 00:13

## 2021-03-12 RX ADMIN — LEVOTHYROXINE SODIUM 50 MCG: 0.05 TABLET ORAL at 06:09

## 2021-03-12 RX ADMIN — CETIRIZINE HYDROCHLORIDE 10 MG: 10 TABLET, FILM COATED ORAL at 09:02

## 2021-03-12 RX ADMIN — LORAZEPAM 1 MG: 1 TABLET ORAL at 10:30

## 2021-03-12 NOTE — DISCHARGE SUMMARY
[address] [facility] [address]  Discharge- Vladimir Bauer 1975, 48 y o  female MRN: [id number]  Unit/Bed#: 425-01 Encounter: [id number]  Primary Care Provider: No primary care provider on file  Date and time admitted to hospital: 3/3/2021  1:26 PM    * Abdominal pain  Assessment & Plan  Patient admits to right upper quadrant pain accompanied with nausea vomiting and 15 diarrheal episodes on the day prior to admission  Stool studies ordered  Formed stool collected 3/7/21  Stool Enteric Bacterial Panel- negative   Fecal calprotectin- pending   Ova and parasite- negative   Fecal leukocytes- negative    CT abdomen/pelvis: Bowel wall thickening of the descending colon extending to the rectum suggestive of colitis of an infectious/inflammatory etiology  2 6 cm fluid attenuation structure in the right adnexal region may represent an ovarian cyst in the absence of a history of oophorectomy  Transvaginal US: Limited visualization of a complex-appearing cystic structure in the right ovary measuring 2 6 cm  Recommend follow-up pelvic ultrasound in 6-12 weeks  Obstruction series - negative  Liver function testing and lipase - normal     US RUQ: Unremarkable right upper quadrant exam status post cholecystectomy  Colonoscopy:  IMPRESSION:  Three large linear aphthous appearing ulcers in the sigmoid colon approximately 45 cm from the anal verge suggestive of Crohn's disease or ischemia     Unfortunately I was unable to enter the terminal ileum        RECOMMENDATION:  Repeat in 5 years due to a personal history of colon polyps  Await pathology results  Continue current medications    EGD:   IMPRESSION:  Normal upper endoscopy      RECOMMENDATION:  Await pathology results  Continue current medications  Colonoscopy to follow    3/11 - Ongoing pain, worse with food intake  Only tolerating clear liquid diet, but no further N/V  No diarrhea   Will transition from IV dilaudid to PO oxycodone, and IV ativan to PO ativan in preparation for discharge  Will trial carafate for ongoing pain  Notes protonix is helping  3/12 - labs and vitals remain stable  Discussed with gastroenterology - remains stable for discharge and may continue PO steroids at home, close outpatient follow up with GI  Hyponatremia  Assessment & Plan  Mild, 135  Tolerating PO fluids  Will discontinue IV fluids and follow up labs tomorrow morning  Inflammatory bowel disease  Assessment & Plan  Uncertain if crohn's vs  UC based on GI notes  On IV Stelara infusions - drug level is pending  Initially held IV steroids pending stool sample collection, need to rule out bacterial colitis, however, given discontinuation of diarrhea for several days, resolution of leukocytosis, and afebrile state, patient was started on IV steroids 3/8/21  Received IV solumedrol 60mg once on 3/7 in AM, then 40mg IV BID dosing - transitioned to PO steroids, to continue on discharge at 40mg daily per GI recommendations  Needs continued close follow up with Gastroenterology               Thrombocytosis (HCC)  Assessment & Plan  platelet count of 812 today  Platelet count of 722 on admission  Trend CBC daily    Mast cell activation syndrome [facility])  Assessment & Plan  Patient is seen/managed by physician in [place]   -  Dr Fabi Coats at [facility] CHILD AND ADOL[facility]CENT [facility] - [phone]  Spoke with allergist on-call 3/8/21 who noted the patient's symptoms who recommended Steroid taper, no need for transfer  Transitioned from IV steroids to PO steroids  continue with IV Benadryl as needed   Avoid known triggers  Leukocytosis  Assessment & Plan  As evidenced by white blood cell count of 10 61 on admission, peaked at 16 22 now in the setting of IV steroid administration  Remains afebrile  Hypothyroidism (acquired)  Assessment & Plan  Patient reportedly not taking levothyroxine for last several weeks  Patient currently refusing this      Generalized anxiety disorder  Assessment & Plan  Continue Buspar, atarax, temazepam   Patient reports can only take her own home prescription pills-currently will provide Ativan 0 5 p r n  As needed for anxiety      Hypokalemia  Assessment & Plan  Potassium level 3 2 on admission  Resolved since receiving IV Potassium 40 mEq      Diarrhea  Assessment & Plan  Now resolved  See plan for "Abdominal pain"      Discharging Physician / Practitioner: Wild Russo PA-C  PCP: No primary care provider on file  Admission Date:   Admission Orders (From admission, onward)     Ordered        03/03/21 1805  Inpatient Admission  Once                   Discharge Date: 03/12/21    Resolved Problems  Date Reviewed: 3/12/2021    None          Consultations During Hospital Stay:  · gastroenterology    Procedures Performed:   · none    Significant Findings / Test Results:   3/3/21 - CT abdomen and pelvis: IMPRESSION:  Bowel wall thickening of the descending colon extending to the rectum suggestive of colitis of an infectious/inflammatory etiology  2 6 cm fluid attenuation structure in the right adnexal region may represent an ovarian cyst in the absence of a history of oophorectomy  3/4/21 Ultrasound pelvis:   IMPRESSION:  1  Limited visualization of a complex-appearing cystic structure in the right ovary measuring 2 6 cm  Recommend follow-up pelvic ultrasound in 6-12 weeks  2   Left ovary not visualized      3/6/21: Obstruction series -   IMPRESSION:  Nonobstructive bowel gas pattern  Minimal scarring or subsegmental atelectasis in the lingula  3/8/21: Right upper quadrant ultrasound -   IMPRESSION:  Unremarkable right upper quadrant exam status post cholecystectomy        Incidental Findings:   · R ovary abnormality - recommend follow up ultrasound in 6-12 weeks  To discuss with and follow with PCP or OB/Gyn    Fecal calprotectin - 811    Stool enteric panel - negative         Test Results Pending at Discharge (will require follow up):   none     Outpatient Tests Requested:  · none    Complications:  no    Reason for Admission: abdominal pain    Hospital Course:   Josephine Martinez is a 48 y o  female patient who originally presented to the hospital on 3/3/2021 due to Abdominal Pain (patient reports RUQ pain for at least a week  patient reports n/v/d  extensive GI problems)    Patient was admitted for treatment of abdominal pain including IV steroids, IV opioids, and IV protonix  Gastroenterology evaluated the patient and case was discussed with patient's allergist on call  Ultimately diet was slowly advanced as tolerated and she was discharged home with a oral steroid taper  She needs close outpatient follow up  Please see above list of diagnoses and related plan for additional information  Condition at Discharge: good     Discharge Day Visit / Exam:   Subjective:    Vitals: Blood Pressure: 102/67 (03/12/21 0702)  Pulse: 61 (03/12/21 0702)  Temperature: 98 1 °F (36 7 °C) (03/12/21 0702)  Temp Source: Oral (03/12/21 0702)  Respirations: 17 (03/12/21 0702)  Height: 5' 5 25" (165 7 cm) (03/03/21 1333)  Weight - Scale: 65 9 kg (145 lb 4 5 oz) (03/03/21 1333)  SpO2: 95 % (03/12/21 0595)  Exam:   Physical Exam  Vitals signs and nursing note reviewed  Constitutional:       Appearance: She is not ill-appearing  HENT:      Head: Normocephalic  Mouth/Throat:      Mouth: Mucous membranes are moist    Cardiovascular:      Rate and Rhythm: Normal rate  Heart sounds: No murmur  Pulmonary:      Effort: Pulmonary effort is normal       Breath sounds: Normal breath sounds  Abdominal:      Tenderness: There is abdominal tenderness  Musculoskeletal:      Right lower leg: No edema  Left lower leg: No edema  Skin:     General: Skin is warm  Neurological:      Mental Status: She is alert and oriented to person, place, and time     Psychiatric:         Mood and Affect: Mood normal          Discharge instructions/Information to patient and family:   See after visit summary for information provided to patient and family  Provisions for Follow-Up Care:  See after visit summary for information related to follow-up care and any pertinent home health orders  Disposition:     Home      Planned Readmission: no     Discharge Statement:  I spent 50 minutes discharging the patient  This time was spent on the day of discharge  I had direct contact with the patient on the day of discharge  Greater than 50% of the total time was spent examining patient, answering all patient questions, arranging and discussing plan of care with patient as well as directly providing post-discharge instructions  Additional time then spent on discharge activities  Discharge Medications:  See after visit summary for reconciled discharge medications provided to patient and family        ** Please Note: This note has been constructed using a voice recognition system **

## 2021-03-12 NOTE — ASSESSMENT & PLAN NOTE
Patient is seen/managed by physician in [place]   -  Dr Wang Sheehan at [facility] AND ADOL[facility]CENT [facility] - [phone]  Spoke with allergist on-call 3/8/21 who noted the patient's symptoms who recommended Steroid taper, no need for transfer  Transitioned from IV steroids to PO steroids  continue with IV Benadryl as needed   Avoid known triggers

## 2021-03-12 NOTE — ASSESSMENT & PLAN NOTE
As evidenced by white blood cell count of 10 61 on admission, peaked at 16 22 now in the setting of IV steroid administration  Remains afebrile

## 2021-03-12 NOTE — ASSESSMENT & PLAN NOTE
Uncertain if crohn's vs  UC based on GI notes  On IV Stelara infusions - drug level is pending  Initially held IV steroids pending stool sample collection, need to rule out bacterial colitis, however, given discontinuation of diarrhea for several days, resolution of leukocytosis, and afebrile state, patient was started on IV steroids 3/8/21  Received IV solumedrol 60mg once on 3/7 in AM, then 40mg IV BID dosing - transitioned to PO steroids, to continue on discharge at 40mg daily per GI recommendations       Needs continued close follow up with Gastroenterology

## 2021-03-12 NOTE — ASSESSMENT & PLAN NOTE
Patient admits to right upper quadrant pain accompanied with nausea vomiting and 15 diarrheal episodes on the day prior to admission  Stool studies ordered  Formed stool collected 3/7/21  Stool Enteric Bacterial Panel- negative   Fecal calprotectin- pending   Ova and parasite- negative   Fecal leukocytes- negative    CT abdomen/pelvis: Bowel wall thickening of the descending colon extending to the rectum suggestive of colitis of an infectious/inflammatory etiology  2 6 cm fluid attenuation structure in the right adnexal region may represent an ovarian cyst in the absence of a history of oophorectomy  Transvaginal US: Limited visualization of a complex-appearing cystic structure in the right ovary measuring 2 6 cm  Recommend follow-up pelvic ultrasound in 6-12 weeks  Obstruction series - negative  Liver function testing and lipase - normal     US RUQ: Unremarkable right upper quadrant exam status post cholecystectomy  Colonoscopy:  IMPRESSION:  Three large linear aphthous appearing ulcers in the sigmoid colon approximately 45 cm from the anal verge suggestive of Crohn's disease or ischemia     Unfortunately I was unable to enter the terminal ileum        RECOMMENDATION:  Repeat in 5 years due to a personal history of colon polyps  Await pathology results  Continue current medications    EGD:   IMPRESSION:  Normal upper endoscopy      RECOMMENDATION:  Await pathology results  Continue current medications  Colonoscopy to follow    3/11 - Ongoing pain, worse with food intake  Only tolerating clear liquid diet, but no further N/V  No diarrhea  Will transition from IV dilaudid to PO oxycodone, and IV ativan to PO ativan in preparation for discharge  Will trial carafate for ongoing pain  Notes protonix is helping  3/12 - labs and vitals remain stable   Discussed with gastroenterology - remains stable for discharge and may continue PO steroids at home, close outpatient follow up with GI

## 2021-03-12 NOTE — PLAN OF CARE
Problem: Potential for Falls  Goal: Patient will remain free of falls  Description: INTERVENTIONS:  - Assess patient frequently for physical needs  -  Identify cognitive and physical deficits and behaviors that affect risk of falls    -  [place] fall precautions as indicated by assessment   - Educate patient/family on patient safety including physical limitations  - Instruct patient to call for assistance with activity based on assessment  - Modify environment to reduce risk of injury  - Consider OT/PT consult to assist with strengthening/mobility  3/12/2021 1406 by Rudy Manley RN  Outcome: Adequate for Discharge  3/12/2021 0727 by Rudy Manley RN  Outcome: Progressing     Problem: GASTROINTESTINAL - ADULT  Goal: Maintains or returns to baseline bowel function  Description: INTERVENTIONS:  - Assess bowel function  - Encourage oral fluids to ensure adequate hydration  - Administer IV fluids if ordered to ensure adequate hydration  - Administer ordered medications as needed  - Encourage mobilization and activity  - Consider nutritional services referral to assist patient with adequate nutrition and appropriate food choices  3/12/2021 1406 by Rudy Manley RN  Outcome: Adequate for Discharge  3/12/2021 0727 by Rudy Manley RN  Outcome: Progressing  Goal: Maintains adequate nutritional intake  Description: INTERVENTIONS:  - Monitor percentage of each meal consumed  - Identify factors contributing to decreased intake, treat as appropriate  - Assist with meals as needed  - Monitor I&O, weight, and lab values if indicated  - Obtain nutrition services referral as needed  3/12/2021 1406 by Rudy Manley RN  Outcome: Adequate for Discharge  3/12/2021 0727 by Rudy Manley RN  Outcome: Progressing  Goal: Minimal or absence of nausea and/or vomiting  Description: INTERVENTIONS:  - Administer IV fluids if ordered to ensure adequate hydration  - Administer ordered antiemetic medications as needed (PRN Zofran)  - Provide nonpharmacologic comfort measures as appropriate  - Advance diet as tolerated, if ordered  - Consider nutrition services referral to assist patient with adequate nutrition and appropriate food choices  3/12/2021 1406 by Reymundo Reyes RN  Outcome: Adequate for Discharge  3/12/2021 0727 by Reymundo Reyes RN  Outcome: Progressing     Problem: METABOLIC, FLUID AND ELECTROLYTES - ADULT  Goal: Electrolytes maintained within normal limits  Description: INTERVENTIONS:  - Monitor labs and assess patient for signs and symptoms of electrolyte imbalances  - Administer electrolyte replacement as ordered  - Monitor response to electrolyte replacements, including repeat lab results as appropriate  - Instruct patient on fluid and nutrition as appropriate  3/12/2021 1406 by Reymundo Reyes RN  Outcome: Adequate for Discharge  3/12/2021 0727 by Reymundo Reyes RN  Outcome: Progressing     Problem: PAIN - ADULT  Goal: Verbalizes/displays adequate comfort level or baseline comfort level  Description: Interventions:  - Encourage patient to monitor pain and request assistance  - Assess pain using appropriate pain scale (0-10 pain scale)  - Administer analgesics based on type and severity of pain and evaluate response  - Implement non-pharmacological measures as appropriate and evaluate response  - Consider cultural and social influences on pain and pain management  - Notify physician/advanced practitioner if interventions unsuccessful or patient reports new pain  3/12/2021 1406 by Reymundo Reyes RN  Outcome: Adequate for Discharge  3/12/2021 0727 by Reymundo Reyes RN  Outcome: Progressing     Problem: INFECTION - ADULT  Goal: Absence or prevention of progression during hospitalization  Description: INTERVENTIONS:  - Assess and monitor for signs and symptoms of infection  - Monitor lab/diagnostic results  - Monitor all insertion sites, i e  indwelling lines, tubes, and drains  - Administer medications as ordered  - Instruct and encourage patient and family to use good hand hygiene technique  - Identify and instruct in appropriate isolation precautions for identified infection/condition  3/12/2021 1406 by Scott Nagy RN  Outcome: Adequate for Discharge  3/12/2021 0727 by Scott Nagy RN  Outcome: Progressing     Problem: SAFETY ADULT  Goal: Maintain or return to baseline ADL function  Description: INTERVENTIONS:  -  Assess patient's ability to carry out ADLs; assess patient's baseline for ADL function and identify physical deficits which impact ability to perform ADLs (bathing, care of mouth/teeth, toileting, grooming, dressing, etc )  - Assess/evaluate cause of self-care deficits   - Assess range of motion  - Assess patient's mobility; develop plan if impaired  - Assess patient's need for assistive devices and provide as appropriate  - Encourage maximum independence but intervene and supervise when necessary  - Involve family in performance of ADLs  - Assess for home care needs following discharge   - Consider OT consult to assist with ADL evaluation and planning for discharge  - Provide patient education as appropriate  3/12/2021 1406 by Scott Nagy RN  Outcome: Adequate for Discharge  3/12/2021 0727 by Scott Nayg RN  Outcome: Progressing  Goal: Maintain or return mobility status to optimal level  Description: INTERVENTIONS:  - Assess patient's baseline mobility status (ambulation, transfers, stairs, etc )    - Identify cognitive and physical deficits and behaviors that affect mobility  - Identify mobility aids required to assist with transfers and/or ambulation (gait belt, sit-to-stand, lift, walker, cane, etc )  - [place] fall precautions as indicated by assessment  - Record patient progress and toleration of activity level on Mobility SBAR; progress patient to next Phase/Stage  - Instruct patient to call for assistance with activity based on assessment  - Consider rehabilitation consult to assist with strengthening/weightbearing, etc   3/12/2021 1406 by Reymundo Reyes RN  Outcome: Adequate for Discharge  3/12/2021 0727 by Reymundo Reyes RN  Outcome: Progressing     Problem: DISCHARGE PLANNING  Goal: Discharge to home or other facility with appropriate resources  Description: INTERVENTIONS:  - Identify barriers to discharge w/patient and caregiver  - Arrange for needed discharge resources and transportation as appropriate  - Identify discharge learning needs (meds, wound care, etc )  - Refer to Case Management Department for coordinating discharge planning if the patient needs post-hospital services based on physician/advanced practitioner order or complex needs related to functional status, cognitive ability, or social support system  3/12/2021 1406 by Reymundo Reyes RN  Outcome: Adequate for Discharge  3/12/2021 0727 by Reymundo Reyes RN  Outcome: Progressing     Problem: Knowledge Deficit  Goal: Patient/family/caregiver demonstrates understanding of disease process, treatment plan, medications, and discharge instructions  Description: Complete learning assessment and assess knowledge base  Interventions:  - Provide teaching at level of understanding  - Provide teaching via preferred learning methods  3/12/2021 1406 by Reymundo Reyes RN  Outcome: Adequate for Discharge  3/12/2021 0727 by Reymundo Reyes RN  Outcome: Progressing     Problem: Nutrition/Hydration-ADULT  Goal: Nutrient/Hydration intake appropriate for improving, restoring or maintaining nutritional needs  Description: Monitor and assess patient's nutrition/hydration status for malnutrition  Collaborate with interdisciplinary team and initiate plan and interventions as ordered  Monitor patient's weight and dietary intake as ordered or per policy  Utilize nutrition screening tool and intervene as necessary   Determine patient's food preferences and provide high-protein, high-caloric foods as appropriate       INTERVENTIONS:  - Monitor oral intake, urinary output, labs, and treatment plans  - Assess nutrition and hydration status and recommend course of action  - Evaluate amount of meals eaten  - Assist patient with eating if necessary   - Allow adequate time for meals  - Recommend/ encourage appropriate diets, oral nutritional supplements, and vitamin/mineral supplements  - Assess need for intravenous fluids  - Provide specific nutrition/hydration education as appropriate  - Include patient/family/caregiver in decisions related to nutrition  3/12/2021 1406 by Scott Nagy RN  Outcome: Adequate for Discharge  3/12/2021 0727 by Scott Nagy RN  Outcome: Progressing

## 2021-03-12 NOTE — PLAN OF CARE
Problem: Potential for Falls  Goal: Patient will remain free of falls  Description: INTERVENTIONS:  - Assess patient frequently for physical needs  -  Identify cognitive and physical deficits and behaviors that affect risk of falls    -  [place] fall precautions as indicated by assessment   - Educate patient/family on patient safety including physical limitations  - Instruct patient to call for assistance with activity based on assessment  - Modify environment to reduce risk of injury  - Consider OT/PT consult to assist with strengthening/mobility  Outcome: Progressing     Problem: GASTROINTESTINAL - ADULT  Goal: Maintains or returns to baseline bowel function  Description: INTERVENTIONS:  - Assess bowel function  - Encourage oral fluids to ensure adequate hydration  - Administer IV fluids if ordered to ensure adequate hydration  - Administer ordered medications as needed  - Encourage mobilization and activity  - Consider nutritional services referral to assist patient with adequate nutrition and appropriate food choices  Outcome: Progressing  Goal: Maintains adequate nutritional intake  Description: INTERVENTIONS:  - Monitor percentage of each meal consumed  - Identify factors contributing to decreased intake, treat as appropriate  - Assist with meals as needed  - Monitor I&O, weight, and lab values if indicated  - Obtain nutrition services referral as needed  Outcome: Progressing  Goal: Minimal or absence of nausea and/or vomiting  Description: INTERVENTIONS:  - Administer IV fluids if ordered to ensure adequate hydration  - Administer ordered antiemetic medications as needed (PRN Zofran)  - Provide nonpharmacologic comfort measures as appropriate  - Advance diet as tolerated, if ordered  - Consider nutrition services referral to assist patient with adequate nutrition and appropriate food choices  Outcome: Progressing     Problem: METABOLIC, FLUID AND ELECTROLYTES - ADULT  Goal: Electrolytes maintained within normal limits  Description: INTERVENTIONS:  - Monitor labs and assess patient for signs and symptoms of electrolyte imbalances  - Administer electrolyte replacement as ordered  - Monitor response to electrolyte replacements, including repeat lab results as appropriate  - Instruct patient on fluid and nutrition as appropriate  Outcome: Progressing     Problem: PAIN - ADULT  Goal: Verbalizes/displays adequate comfort level or baseline comfort level  Description: Interventions:  - Encourage patient to monitor pain and request assistance  - Assess pain using appropriate pain scale (0-10 pain scale)  - Administer analgesics based on type and severity of pain and evaluate response  - Implement non-pharmacological measures as appropriate and evaluate response  - Consider cultural and social influences on pain and pain management  - Notify physician/advanced practitioner if interventions unsuccessful or patient reports new pain  Outcome: Progressing     Problem: INFECTION - ADULT  Goal: Absence or prevention of progression during hospitalization  Description: INTERVENTIONS:  - Assess and monitor for signs and symptoms of infection  - Monitor lab/diagnostic results  - Monitor all insertion sites, i e  indwelling lines, tubes, and drains  - Administer medications as ordered  - Instruct and encourage patient and family to use good hand hygiene technique  - Identify and instruct in appropriate isolation precautions for identified infection/condition  Outcome: Progressing     Problem: SAFETY ADULT  Goal: Maintain or return to baseline ADL function  Description: INTERVENTIONS:  -  Assess patient's ability to carry out ADLs; assess patient's baseline for ADL function and identify physical deficits which impact ability to perform ADLs (bathing, care of mouth/teeth, toileting, grooming, dressing, etc )  - Assess/evaluate cause of self-care deficits   - Assess range of motion  - Assess patient's mobility; develop plan if impaired  - Assess patient's need for assistive devices and provide as appropriate  - Encourage maximum independence but intervene and supervise when necessary  - Involve family in performance of ADLs  - Assess for home care needs following discharge   - Consider OT consult to assist with ADL evaluation and planning for discharge  - Provide patient education as appropriate  Outcome: Progressing  Goal: Maintain or return mobility status to optimal level  Description: INTERVENTIONS:  - Assess patient's baseline mobility status (ambulation, transfers, stairs, etc )    - Identify cognitive and physical deficits and behaviors that affect mobility  - Identify mobility aids required to assist with transfers and/or ambulation (gait belt, sit-to-stand, lift, walker, cane, etc )  - [place] fall precautions as indicated by assessment  - Record patient progress and toleration of activity level on Mobility SBAR; progress patient to next Phase/Stage  - Instruct patient to call for assistance with activity based on assessment  - Consider rehabilitation consult to assist with strengthening/weightbearing, etc   Outcome: Progressing     Problem: DISCHARGE PLANNING  Goal: Discharge to home or other facility with appropriate resources  Description: INTERVENTIONS:  - Identify barriers to discharge w/patient and caregiver  - Arrange for needed discharge resources and transportation as appropriate  - Identify discharge learning needs (meds, wound care, etc )  - Refer to Case Management Department for coordinating discharge planning if the patient needs post-hospital services based on physician/advanced practitioner order or complex needs related to functional status, cognitive ability, or social support system  Outcome: Progressing     Problem: Knowledge Deficit  Goal: Patient/family/caregiver demonstrates understanding of disease process, treatment plan, medications, and discharge instructions  Description: Complete learning assessment and assess knowledge base  Interventions:  - Provide teaching at level of understanding  - Provide teaching via preferred learning methods  Outcome: Progressing     Problem: Nutrition/Hydration-ADULT  Goal: Nutrient/Hydration intake appropriate for improving, restoring or maintaining nutritional needs  Description: Monitor and assess patient's nutrition/hydration status for malnutrition  Collaborate with interdisciplinary team and initiate plan and interventions as ordered  Monitor patient's weight and dietary intake as ordered or per policy  Utilize nutrition screening tool and intervene as necessary  Determine patient's food preferences and provide high-protein, high-caloric foods as appropriate       INTERVENTIONS:  - Monitor oral intake, urinary output, labs, and treatment plans  - Assess nutrition and hydration status and recommend course of action  - Evaluate amount of meals eaten  - Assist patient with eating if necessary   - Allow adequate time for meals  - Recommend/ encourage appropriate diets, oral nutritional supplements, and vitamin/mineral supplements  - Assess need for intravenous fluids  - Provide specific nutrition/hydration education as appropriate  - Include patient/family/caregiver in decisions related to nutrition  Outcome: Progressing

## 2021-03-12 NOTE — CASE MANAGEMENT
Pt is discharged today  I in basket messaged pt's PCP to call the patient at home with a follow up appointment  Pt has a follow up appointment already scheduled with GI and cardiology  AVS and discharge instructions reviewed with patient  Case Management reviewed discharge planning process including the following: identifying help that is needed at home, pt's preference for discharge needs and Meds at [facility]  Reviewed with Pt that any member of the healthcare team can answer questions regarding : medications, jmportance of recognizing  Signs and symptoms of any  medical problems  Case Management also encouraged pt to follow up with all recommended appointments after discharge

## 2021-03-13 LAB
Lab: NORMAL
MISCELLANEOUS LAB TEST RESULT: NORMAL
STONE ANALYSIS-IMP: NORMAL

## 2021-03-14 ENCOUNTER — DOCUMENTATION (OUTPATIENT)
Dept: GASTROENTEROLOGY | Facility: MEDICAL CENTER | Age: 46
End: 2021-03-14

## 2021-03-15 NOTE — UTILIZATION REVIEW
Notification of Discharge  This is a Notification of Discharge from our facility [address]  Please be advised that this patient has been discharge from our facility  Below you will find the admission and discharge date and time including the patients disposition  PRESENTATION DATE: 3/3/2021  1:26 PM  OBS ADMISSION DATE:   IP ADMISSION DATE: 3/3/21 1805   DISCHARGE DATE: 3/12/2021  2:50 PM  DISPOSITION: Home/Self Care Home/Self Care   Admission Orders listed below:  Admission Orders (From admission, onward)     Ordered        03/03/21 1805  Inpatient Admission  Once                   Please contact the UR Department if additional information is required to close this patient's authorization/case  [facility] Utilization Review Department  Main: [phone] x carefully listen to the prompts  All voicemails are confidential   [email or web address]  Send all requests for admission clinical reviews, approved or denied determinations and any other requests to dedicated fax number below belonging to the campus where the patient is receiving treatment   List of dedicated fax numbers:  [address] DENIALS (Administrative/Medical Necessity) [phone]   [address] (Maternity/NICU/Pediatrics) [phone]   Elisa Magana [phone]   Aura Leija [phone]   Arturo Ware [phone]   Piedad Armstrong [address] [phone]   [facility]  [phone]   [address] [facility]  [address] [phone]

## 2021-03-19 ENCOUNTER — TELEPHONE (OUTPATIENT)
Dept: GASTROENTEROLOGY | Facility: MEDICAL CENTER | Age: 46
End: 2021-03-19

## 2021-03-19 DIAGNOSIS — K50.119 CROHN'S DISEASE OF COLON WITH COMPLICATION (HCC): Primary | ICD-10-CM

## 2021-03-19 RX ORDER — USTEKINUMAB 90 MG/ML
90 INJECTION, SOLUTION SUBCUTANEOUS
Qty: 1 SYRINGE | Refills: 6 | Status: SHIPPED | OUTPATIENT
Start: 2021-03-19 | End: 2021-12-07 | Stop reason: HOSPADM

## 2021-03-19 NOTE — TELEPHONE ENCOUNTER
Melida Sharma,    Can we increase her Stelara to every 4 week dosing? Her level was low   I will send in a new script to the printer    Thank you

## 2021-03-19 NOTE — TELEPHONE ENCOUNTER
----- Message from Hemalatha Morrissey sent at 3/19/2021  7:17 AM EDT -----  Regarding: FW:Follow-up  Contact: [phone]    ----- Message -----  From: Jaylin Carrasco  Sent: 3/18/2021   7:05 PM EDT  To: , #  Subject: RE:Follow-up                                     that sounds exactly how I feel  please proceed with processing so the daily dose can be given on its 4 week intervals  my last dose was was 2/25/2021    thank you,  Polo Ch

## 2021-03-23 ENCOUNTER — TELEPHONE (OUTPATIENT)
Dept: BONE DENSITY | Facility: HOSPITAL | Age: 46
End: 2021-03-23

## 2021-03-24 ENCOUNTER — TELEPHONE (OUTPATIENT)
Dept: GASTROENTEROLOGY | Facility: MEDICAL CENTER | Age: 46
End: 2021-03-24

## 2021-03-24 ENCOUNTER — TELEMEDICINE (OUTPATIENT)
Dept: GASTROENTEROLOGY | Facility: MEDICAL CENTER | Age: 46
End: 2021-03-24
Payer: COMMERCIAL

## 2021-03-24 DIAGNOSIS — D84.9 IMMUNOSUPPRESSED STATUS (HCC): ICD-10-CM

## 2021-03-24 DIAGNOSIS — E44.1 MILD PROTEIN-CALORIE MALNUTRITION (HCC): ICD-10-CM

## 2021-03-24 DIAGNOSIS — K52.9 INFLAMMATORY BOWEL DISEASE: Primary | ICD-10-CM

## 2021-03-24 PROCEDURE — 99213 OFFICE O/P EST LOW 20 MIN: CPT | Performed by: INTERNAL MEDICINE

## 2021-03-24 RX ORDER — SODIUM CHLORIDE, SODIUM LACTATE, POTASSIUM CHLORIDE, CALCIUM CHLORIDE 600; 310; 30; 20 MG/100ML; MG/100ML; MG/100ML; MG/100ML
1000 INJECTION, SOLUTION INTRAVENOUS ONCE
Status: CANCELLED
Start: 2021-03-26

## 2021-03-24 RX ORDER — SODIUM CHLORIDE, SODIUM LACTATE, POTASSIUM CHLORIDE, CALCIUM CHLORIDE 600; 310; 30; 20 MG/100ML; MG/100ML; MG/100ML; MG/100ML
500 INJECTION, SOLUTION INTRAVENOUS CONTINUOUS
Status: CANCELLED
Start: 2021-03-24

## 2021-03-24 NOTE — PROGRESS NOTES
Virtual Regular Visit      Assessment/Plan:  47 yo woman with IBD (possible Crohn's, indeterminate colitis), possible MCAS (previously diagnosed with Dr Venkata Carreno), recent admission for diarrhea and abdominal pain with improvement of stools but ongoing pain in the hospital  She was given prednisone and underwent an EGD and colonoscopy with some focal active colitis  Her symptoms are likely multifactorial from IBD, IBS/SIBO/functional GI  CRP <0 5  CMp with low albumin at 2 9  WBC 16 22 (on prednisone)  Normal Hgb  Problem List Items Addressed This Visit        Digestive    Inflammatory bowel disease - Primary       Other    Immunosuppressed status ([facility] [address] )    Mild protein-calorie malnutrition ([facility] [address] )          Decrease the prednisone by 5mg every 5 days until off  Will try to increase the Stelara to every 4 week dosing based on low levels  She will call to schedule hydration, switched to LR per her erequest  Another physician is prescribing her Lorazepam  She has an upcoming appointment with Allergy in July         Reason for visit is   Chief Complaint   Patient presents with    Virtual Regular Visit        Encounter provider Tracy Mckinley MD    Provider located at [place] [phone]      Recent Visits  Date Type Provider Dept   03/19/21 Telephone Tracy Mckinley MD [facility] recent visits within past 7 days and meeting all other requirements     Today's Visits  Date Type Provider Dept   03/24/21 Telephone Tracy Mckinley MD [address] [facility] [place] [facility]""[address]   03/24/21 Telemedicine MD Epi [facility] 9172 today's visits and meeting all other requirements     Future Appointments  No visits were found meeting these conditions     Showing future appointments within next 150 days and meeting all other requirements        The patient was identified by name and date of birth  Manish Christy was informed that this is a telemedicine visit and that the visit is being conducted through telephone  My office door was closed  No one else was in the room  She acknowledged consent and understanding of privacy and security of the video platform  The patient has agreed to participate and understands they can discontinue the visit at any time  Patient is aware this is a billable service  It was my intent to perform this visit via video technology but the patient was not able to do a video connection so the visit was completed via audio telephone only  Subjective  Manish Christy is a 48 y o  female with IBD here for follow-up   Since coming home she has been trying to hydrate  She remains on 40mg of prednisone  Yesterday she started to feel a little better  She was outside  She ate dinner last evening and did not have as much abdominal pain  It is still present but not as bad (no longer a 10/10)  The pain ranges between 4-7 out of 10, worse as the day goes on  Worse with eating  Better when she lays down and stretches out  It is located near the right upper quadrant  No fevers, chills  She had 10 lb weight loss in the hospital  She is not having many BMs but thinks she is not eating  Now 2 formed BMs without blood  + hair loss  When she took the Stelara injection she did not get a huge relief (she did have improvement after the infusion initially)  EGD:  Normal upper endoscopy     Colonoscopy: Three large linear aphthous appearing ulcers in the sigmoid colon approximately 45 cm from the anal verge suggestive of Crohn's disease or ischemia     Path:  A  Duodenum (biopsy):  - Duodenal mucosa with no diagnostic abnormality  - No Marsh lesion  - Negative for dysplasia      B  Stomach (biopsy):  - Mild chronic inactive antral gastritis  - Oxyntic mucosa with no diagnostic abnormality  - No H pylori identified (H&E)  - No intestinal metaplasia      C  Colon, random (biopsy):  - Focal active colitis   - No microscopic colitis  - Negative for dysplasia     Comment: Features are non-specific        D  Cecal polyp (biopsy):  - Tubular adenoma  - No high grade dysplasia (deeper sections examined)     E  Sigmoid colon ulcer (biopsy):  - Focal active colitis with acute inflammatory exudate  - No viral inclusions  - Negative for dysplasia      Comment: Features are non-specific  Past Medical History:   Diagnosis Date    Anxiety     Asthma     Chronic kidney disease     Deep vein thrombosis (HCC)     Disease of thyroid gland     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    Generalized anxiety disorder 8/26/2017    Heart murmur     Migraine     Myocardial infarction [facility])     NSTEMI  pt denies, documented in cardio note    Pancreatitis     sphinger of     Psychiatric disorder     RA (rheumatoid arthritis) ([facility] [address] )     Ulcerative colitis ([facility] [address] )        Past Surgical History:   Procedure Laterality Date    APPENDECTOMY      CHOLECYSTECTOMY      EGD AND COLONOSCOPY N/A 9/12/2017    Procedure: EGD AND COLONOSCOPY;  Surgeon: Randa Estevez MD;  [facility]: [facility]; Service: Gastroenterology    ERCP N/A 9/15/2017    Procedure: ENDOSCOPIC RETROGRADE CHOLANGIOPANCREATOGRAPHY (ERCP); Surgeon: Madeline Fabian MD;  [facility];   Service: Gastroenterology    HYSTERECTOMY      KNEE SURGERY Right     LAPAROSCOPIC ENDOMETRIOSIS FULGURATION      ORIF ANKLE FRACTURE Left     PA KNEE SCOPE,MED/LAT MENISECTOMY Left 5/12/2017    Procedure: POSSIBLE MEDIAL MENISECTOMY;  Surgeon: Obed Gomez MD;  Location: [facility];  Service: Orthopedics    PA KNEE [address] CART Left 5/12/2017    Procedure: KNEE ARTHROSCOPY EVALUATION, CHONDROPLASTY;  Surgeon: Obed Gomez MD;  Location: [facility];  Service: Orthopedics    PA LAP,DIAGNOSTIC ABDOMEN N/A 12/12/2017    Procedure: LAPAROSCOPY DIAGNOSTIC  WITH LYSIS OF ADHESIONS;  Surgeon: Yoli Camacho DO;  [facility];  Service: General       Current Outpatient Medications   Medication Sig Dispense Refill    acetaminophen (TYLENOL) 500 mg tablet Take 1,000 mg by mouth every 6 (six) hours as needed for mild pain      budesonide (ENTOCORT EC) 3 MG capsule Take 3 mg by mouth 3 (three) times a day Before meals      busPIRone (BUSPAR) 5 mg tablet TAKE ONE TABLET BY MOUTH EVERY 24 HOURS 30 tablet 0    butalbital-acetaminophen-caffeine (FIORICET,ESGIC) -40 mg per tablet Take 1 tablet by mouth every 8 (eight) hours as needed for headaches 20 tablet 0    calcium citrate (CALCITRATE) 950 (200 Ca) MG tablet Take 1 tablet (950 mg total) by mouth daily (Patient taking differently: Take 1 tablet by mouth daily ) 90 tablet 3    cetirizine (ZyrTEC) 10 mg tablet Take 10 mg by mouth daily       cholecalciferol (VITAMIN D) 400 units/mL Take 2 5 mL (1,000 Units total) by mouth daily (Patient taking differently: Take 1,200 Units by mouth daily ) 1 Bottle 5    cromolyn (GASTROCROM) 100 MG/5ML solution   Start: 11/16/20 8:54:00 EST, only when in Green zone 2 vials bid      cyanocobalamin 1,000 mcg/mL       diclofenac (VOLTAREN) 75 mg EC tablet Take 1 tablet (75 mg total) by mouth 2 (two) times a day for 21 days 42 tablet 2    diphenhydrAMINE (BENADRYL) 25 mg tablet Take 50 mg by mouth 2 (two) times a day       EPINEPHrine (EPIPEN 2-MARYSOL IJ) EpiPen   prn      ergocalciferol (VITAMIN D2) 50,000 units       ergocalciferol (VITAMIN D2) 50,000 units   Start: 02/22/21 17:37:00 EST, See Instructions, 50,000 Int_Unit PO 1 time per week      hydrOXYzine HCL (ATARAX) 25 mg tablet Take 25 mg by mouth 4 (four) times a day       levalbuterol (XOPENEX HFA) 45 mcg/act inhaler Inhale 1-2 puffs every 4 (four) hours as needed for shortness of breath      levothyroxine 50 mcg tablet Take 50 mcg by mouth daily      liothyronine (CYTOMEL) 5 mcg tablet Take 5 mcg by mouth daily      LORazepam (ATIVAN) 1 mg tablet Take 1 tablet (1 mg total) by mouth every 8 (eight) hours as needed for anxiety 21 tablet 0    montelukast (SINGULAIR) 10 mg tablet Take 1 tablet (10 mg total) by mouth daily at bedtime 30 tablet 0    NON FORMULARY immunoglobin sq 6g 30 ml once a week      omalizumab (XOLAIR) 150 mg Inject 300 mg under the skin every 28 days       pantoprazole (PROTONIX) 40 mg tablet Take 1 tablet (40 mg total) by mouth daily 30 tablet 0    polyethylene glycol (GLYCOLAX) 17 GM/SCOOP powder Take 17 g by mouth daily as needed (constipation) 17 g 0    predniSONE 20 mg tablet Take 2 tablets (40 mg total) by mouth daily 30 tablet 0    Riboflavin (Vitamin B-2) 100 MG TABS Take 4 tablets (400 mg total) by mouth daily (Patient not taking: Reported on 3/3/2021) 360 tablet 3    Stelara 90 MG/ML subcutaneous injection       temazepam (RESTORIL) 30 mg capsule Take 1 capsule (30 mg total) by mouth daily at bedtime as needed for sleep (Patient taking differently: Take 30 mg by mouth daily at bedtime as needed for sleep ) 30 capsule 5    ustekinumab (Stelara) 90 mg/mL subcutaneous injection Inject 1 mL (90 mg total) under the skin every 28 days 1 Syringe 6     Current Facility-Administered Medications   Medication Dose Route Frequency Provider Last Rate Last Admin    cyanocobalamin injection 1,000 mcg  1,000 mcg Intramuscular Q30 Days Anthony Campbell PA-C   1,000 mcg at 10/25/19 1204        Allergies   Allergen Reactions    Amitriptyline Anaphylaxis     According to the patient she had nphylaxis    Aspergillus Fumigatus Other (See Comments), Hives and Swelling    Azathioprine Other (See Comments) and Anaphylaxis     pancreatitis  According to patient she needed Epipen    Eggs Or Egg-Derived Products Anaphylaxis    Sumatriptan Anaphylaxis     Bradycardia, sob, back pressure, head pain,throat tightness  According to the patient she had anphylaxis    Contrast [Iodinated Diagnostic Agents]      Pt states needs to be premedicated prior to injection    Corn Dextrin      Any corn based products    Corn Oil Hives    Gelatin     Histamine      Intolerance      Humira [Adalimumab] Other (See Comments)     "I develop drug induced lupus"    Ibuprofen Hives and Throat Swelling    Lidocaine     Malto Dextrin [Dextrin]     Other      Gel caps, maltodextrose, dextrose,grapes    Penicillium Notatum     Remicade [Infliximab] Other (See Comments)     "I develop drug induced lupus"    Sulfa Antibiotics Hives and Other (See Comments)    Sulfate     Sulfites        REVIEW OF SYSTEMS:  10 point ROS reviewed and negative, except as above      PHYSICAL EXAMINATION:  Unable to perform physical examination given the unavailability of a video application  I spent 25 minutes with patient today in which greater than 50% of the time was spent in counseling/coordination of care regarding impressions, recommendation, management options      VIRTUAL VISIT DISCLAIMER    Darrick Sandhu acknowledges that she has consented to an online visit or consultation  She understands that the online visit is based solely on information provided by her, and that, in the absence of a face-to-face physical evaluation by the physician, the diagnosis she receives is both limited and provisional in terms of accuracy and completeness  This is not intended to replace a full medical face-to-face evaluation by the physician  Darrick Sandhu understands and accepts these terms

## 2021-03-24 NOTE — TELEPHONE ENCOUNTER
Hi,    I changed her infusions from normal saline to lactate ringers  Do we need to let the infusion center know?     Thank you

## 2021-03-25 ENCOUNTER — HOSPITAL ENCOUNTER (OUTPATIENT)
Dept: INFUSION CENTER | Facility: CLINIC | Age: 46
End: 2021-03-25

## 2021-03-25 NOTE — TELEPHONE ENCOUNTER
Called [facility] at [phone] spoke to Garry Yancey  Started a new prior auth since the one on file isn't valid for a frequency change  Pending Auth# [id number]  They will reach out to us by 3/29/2021

## 2021-03-26 ENCOUNTER — HOSPITAL ENCOUNTER (OUTPATIENT)
Dept: INFUSION CENTER | Facility: CLINIC | Age: 46
Discharge: HOME | End: 2021-03-26
Payer: COMMERCIAL

## 2021-03-26 ENCOUNTER — IMMUNIZATIONS (OUTPATIENT)
Dept: FAMILY MEDICINE CLINIC | Facility: HOSPITAL | Age: 46
End: 2021-03-26
Payer: COMMERCIAL

## 2021-03-26 ENCOUNTER — OFFICE VISIT (OUTPATIENT)
Dept: CARDIOLOGY CLINIC | Facility: HOSPITAL | Age: 46
End: 2021-03-26
Payer: COMMERCIAL

## 2021-03-26 ENCOUNTER — CLINICAL SUPPORT (OUTPATIENT)
Dept: CARDIOLOGY CLINIC | Facility: HOSPITAL | Age: 46
End: 2021-03-26
Payer: COMMERCIAL

## 2021-03-26 VITALS
HEIGHT: 65 IN | SYSTOLIC BLOOD PRESSURE: 112 MMHG | HEART RATE: 111 BPM | WEIGHT: 144 LBS | BODY MASS INDEX: 23.99 KG/M2 | DIASTOLIC BLOOD PRESSURE: 78 MMHG

## 2021-03-26 VITALS
OXYGEN SATURATION: 99 % | RESPIRATION RATE: 18 BRPM | TEMPERATURE: 98 F | HEART RATE: 76 BPM | SYSTOLIC BLOOD PRESSURE: 107 MMHG | DIASTOLIC BLOOD PRESSURE: 77 MMHG

## 2021-03-26 DIAGNOSIS — E44.1 MILD PROTEIN-CALORIE MALNUTRITION (HCC): ICD-10-CM

## 2021-03-26 DIAGNOSIS — I50.30 DIASTOLIC CONGESTIVE HEART FAILURE, UNSPECIFIED HF CHRONICITY (HCC): Primary | ICD-10-CM

## 2021-03-26 DIAGNOSIS — G35 MS (MULTIPLE SCLEROSIS) (HCC): ICD-10-CM

## 2021-03-26 DIAGNOSIS — R63.0 ANOREXIA: Primary | ICD-10-CM

## 2021-03-26 DIAGNOSIS — R00.2 PALPITATIONS: ICD-10-CM

## 2021-03-26 DIAGNOSIS — R19.7 DIARRHEA, UNSPECIFIED TYPE: ICD-10-CM

## 2021-03-26 DIAGNOSIS — D89.40 MAST CELL ACTIVATION SYNDROME (HCC): ICD-10-CM

## 2021-03-26 DIAGNOSIS — R00.2 HEART PALPITATIONS: ICD-10-CM

## 2021-03-26 DIAGNOSIS — Z23 ENCOUNTER FOR IMMUNIZATION: Primary | ICD-10-CM

## 2021-03-26 DIAGNOSIS — I50.30 DIASTOLIC HEART FAILURE, UNSPECIFIED HF CHRONICITY (HCC): ICD-10-CM

## 2021-03-26 PROCEDURE — 93242 EXT ECG>48HR<7D RECORDING: CPT | Performed by: INTERNAL MEDICINE

## 2021-03-26 PROCEDURE — 0011A SARS-COV-2 / COVID-19 MRNA VACCINE (MODERNA) 100 MCG: CPT

## 2021-03-26 PROCEDURE — 1111F DSCHRG MED/CURRENT MED MERGE: CPT | Performed by: INTERNAL MEDICINE

## 2021-03-26 PROCEDURE — 3008F BODY MASS INDEX DOCD: CPT | Performed by: INTERNAL MEDICINE

## 2021-03-26 PROCEDURE — 93000 ELECTROCARDIOGRAM COMPLETE: CPT | Performed by: INTERNAL MEDICINE

## 2021-03-26 PROCEDURE — 91301 SARS-COV-2 / COVID-19 MRNA VACCINE (MODERNA) 100 MCG: CPT

## 2021-03-26 PROCEDURE — 1036F TOBACCO NON-USER: CPT | Performed by: INTERNAL MEDICINE

## 2021-03-26 PROCEDURE — 96360 HYDRATION IV INFUSION INIT: CPT

## 2021-03-26 PROCEDURE — 96361 HYDRATE IV INFUSION ADD-ON: CPT

## 2021-03-26 PROCEDURE — 99214 OFFICE O/P EST MOD 30 MIN: CPT | Performed by: INTERNAL MEDICINE

## 2021-03-26 RX ORDER — SODIUM CHLORIDE, SODIUM LACTATE, POTASSIUM CHLORIDE, CALCIUM CHLORIDE 600; 310; 30; 20 MG/100ML; MG/100ML; MG/100ML; MG/100ML
1000 INJECTION, SOLUTION INTRAVENOUS ONCE
Status: COMPLETED | OUTPATIENT
Start: 2021-03-26 | End: 2021-03-26

## 2021-03-26 RX ORDER — SODIUM CHLORIDE, SODIUM LACTATE, POTASSIUM CHLORIDE, CALCIUM CHLORIDE 600; 310; 30; 20 MG/100ML; MG/100ML; MG/100ML; MG/100ML
1000 INJECTION, SOLUTION INTRAVENOUS ONCE
Status: CANCELLED
Start: 2021-04-01

## 2021-03-26 RX ADMIN — SODIUM CHLORIDE, SODIUM LACTATE, POTASSIUM CHLORIDE, AND CALCIUM CHLORIDE 1000 ML: .6; .31; .03; .02 INJECTION, SOLUTION INTRAVENOUS at 09:20

## 2021-03-26 NOTE — PROGRESS NOTES
Cardiology Follow Up    Clarence Bhavani  1975  [id number]  [address] [address]    1  Diastolic congestive heart failure, unspecified HF chronicity (HCC)  POCT ECG    Echo complete with contrast if indicated    AMB extended holter monitor   2  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HCC)     3  MS (multiple sclerosis) ([address] )     4  Heart palpitations  POCT ECG    Echo complete with contrast if indicated    AMB extended holter monitor       Discussion/Summary:   She has not been here in a couple years she returns for a follow-up visit  She has an extensive history of autoimmune inflammatory syndrome sats followed by [facility]  From a cardiac standpoint overall she has been doing well  She is due for an echocardiogram to follow-up on LV function and pulmonary artery pressures given the underlying inflammatory conditions an autoimmune conditions  I have given her a 1 week ambulatory Holter monitor to evaluate average heart rates and rule out any dysrhythmias  Interval History:  26-year-old female I met in the hospital from iatrogenic fluid overload  Overall she has been doing well from a cardiac standpoint  We ordered a echocardiogram last year which showed normal diastolic parameters and stress echo showed no evidence of ischemia  She has 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  From a cardiac standpoint she has no anginal symptoms  She has no lower extremity edema, PND, orthopnea  His been no palpitations  She has had extensive medical follow-up between our last appointment which was about 2 years ago  She is now diagnosed with possible multiple sclerosis and other autoimmune disorders  She also has a CY P 2 gene mutation and list of medications that she cannot take    From a cardiac standpoint she has had no admissions for any decompensated heart failure  Denies any exertional chest pain  There has been frequent palpitations and a sense of heart racing  She has had multiple episodes of palpitations  Denies any significant lower extremity edema, PND, orthopnea      Problem List     Chondromalacia    Crohn's colitis ([facility] )    Ulcerative colitis ([facility] )    Vitamin D deficiency    Hypokalemia    Hypotension    Throat tightness    Septic shock (HCC)    Bradycardia    Abdominal pain    Headache    Hypophosphatemia    Hypomagnesemia    Generalized anxiety disorder (Chronic)    Hypothyroidism (Chronic)    Low TSH level    Hypocalcemia    Hyperglycemia    Acute respiratory failure with hypoxia (HCC)    Bilateral pleural effusion    Acute cardiogenic pulmonary edema (HCC)    Elevated troponin level    Abnormal CT of the abdomen    Leukocytosis    Arm swelling    Thrombophlebitis    Dysuria    Anxiety    CHF (congestive heart failure) (HCC)    Fibromyalgia    Mast cell disorder    Meniere disease    Visual disturbance    Chronic idiopathic urticaria    Disorder of synovium        Past Medical History:   Diagnosis Date    Anxiety     Asthma     Chronic kidney disease     Deep vein thrombosis ([facility] )     Disease of thyroid gland     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    Generalized anxiety disorder 8/26/2017    Heart murmur     Migraine     Myocardial infarction [facility])     NSTEMI  pt denies, documented in cardio note    Pancreatitis     sphinger of     Psychiatric disorder     RA (rheumatoid arthritis) ([facility] )     Ulcerative colitis ([facility] )      Social History     Socioeconomic History    Marital status: /Civil Union     Spouse name: Not on file    Number of children: Not on file    Years of education: Not on file    Highest education level: Not on file   Occupational History    Not on file   Social Needs    Financial resource strain: Not on file    Food insecurity     Worry: Not on file Inability: Not on file    Transportation needs     Medical: Not on file     Non-medical: Not on file   Tobacco Use    Smoking status: Never Smoker    Smokeless tobacco: Never Used   Substance and Sexual Activity    Alcohol use: Never     Frequency: Never     Binge frequency: Never    Drug use: Not Currently    Sexual activity: Yes     Partners: Male   Lifestyle    Physical activity     Days per week: Not on file     Minutes per session: Not on file    Stress: Not on file   Relationships    Social connections     Talks on phone: Not on file     Gets together: Not on file     Attends [employer] service: Not on file     Active member of club or organization: Not on file     Attends meetings of clubs or organizations: Not on file     Relationship status: Not on file    Intimate partner violence     Fear of current or ex partner: Not on file     Emotionally abused: Not on file     Physically abused: Not on file     Forced sexual activity: Not on file   Other Topics Concern    Not on file   Social History Narrative    Not on file      Family History   Problem Relation Age of Onset    Ulcerative colitis Mother     Heart failure Father     Neuropathy Father     Macular degeneration Father     Diabetes Father     Asthma Daughter     Hypothyroidism Daughter     Heart disease Maternal Grandmother     Arthritis Maternal Grandmother     Arthritis Paternal Grandmother     Macular degeneration Paternal Grandmother     Lymphoma Paternal Grandfather     Heart failure Paternal Aunt     Heart failure Paternal Aunt     Breast cancer Paternal Aunt 47    Breast cancer additional onset Paternal Aunt 62    Hypothyroidism Paternal Aunt     Breast cancer Maternal Aunt     No Known Problems Maternal Grandfather      Past Surgical History:   Procedure Laterality Date    APPENDECTOMY      CHOLECYSTECTOMY      EGD AND COLONOSCOPY N/A 9/12/2017    Procedure: EGD AND COLONOSCOPY;  Surgeon: Tony Mukherjee MD; [facility]; Service: Gastroenterology    ERCP N/A 9/15/2017    Procedure: ENDOSCOPIC RETROGRADE CHOLANGIOPANCREATOGRAPHY (ERCP); Surgeon: Frandy Champion MD;  [facility];   Service: Gastroenterology    HYSTERECTOMY      KNEE SURGERY Right     LAPAROSCOPIC ENDOMETRIOSIS FULGURATION      ORIF ANKLE FRACTURE Left     IA KNEE SCOPE,MED/LAT MENISECTOMY Left 5/12/2017    Procedure: POSSIBLE MEDIAL MENISECTOMY;  Surgeon: Stephany Norman MD;  Location: [facility];  Service: Orthopedics    IA KNEE [address] CART Left 5/12/2017    Procedure: KNEE ARTHROSCOPY EVALUATION, CHONDROPLASTY;  Surgeon: Stephany Norman MD;  [facility];  Service: Orthopedics    IA LAP,DIAGNOSTIC ABDOMEN N/A 12/12/2017    Procedure: LAPAROSCOPY DIAGNOSTIC  WITH LYSIS OF ADHESIONS;  Surgeon: Irineo Murillo DO;  Location:  MAIN OR;  Service: General       Current Outpatient Medications:     acetaminophen (TYLENOL) 500 mg tablet, Take 1,000 mg by mouth every 6 (six) hours as needed for mild pain, Disp: , Rfl:     budesonide (ENTOCORT EC) 3 MG capsule, Take 3 mg by mouth 3 (three) times a day Before meals, Disp: , Rfl:     busPIRone (BUSPAR) 5 mg tablet, TAKE ONE TABLET BY MOUTH EVERY 24 HOURS, Disp: 30 tablet, Rfl: 0    butalbital-acetaminophen-caffeine (FIORICET,ESGIC) -40 mg per tablet, Take 1 tablet by mouth every 8 (eight) hours as needed for headaches, Disp: 20 tablet, Rfl: 0    cetirizine (ZyrTEC) 10 mg tablet, Take 20 mg by mouth daily , Disp: , Rfl:     cholecalciferol (VITAMIN D) 400 units/mL, Take 2 5 mL (1,000 Units total) by mouth daily (Patient taking differently: Take 1,200 Units by mouth daily ), Disp: 1 Bottle, Rfl: 5    cromolyn (GASTROCROM) 100 MG/5ML solution,  Start: 11/16/20 8:54:00 EST, only when in Green zone 2 vials bid, Disp: , Rfl:     cyanocobalamin 1,000 mcg/mL, , Disp: , Rfl:     diclofenac (VOLTAREN) 75 mg EC tablet, Take 1 tablet (75 mg total) by mouth 2 (two) times a day for 21 days (Patient taking differently: Take 75 mg by mouth as needed ), Disp: 42 tablet, Rfl: 2    diphenhydrAMINE (BENADRYL) 25 mg tablet, Take 50 mg by mouth 2 (two) times a day , Disp: , Rfl:     EPINEPHrine (EPIPEN 2-MARYSOL IJ), EpiPen  prn, Disp: , Rfl:     ergocalciferol (VITAMIN D2) 50,000 units,  Start: 02/22/21 17:37:00 EST, See Instructions, 50,000 Int_Unit PO 1 time per week, Disp: , Rfl:     hydrOXYzine HCL (ATARAX) 25 mg tablet, Take 25 mg by mouth 4 (four) times a day , Disp: , Rfl:     levalbuterol (XOPENEX HFA) 45 mcg/act inhaler, Inhale 1-2 puffs every 4 (four) hours as needed for shortness of breath, Disp: , Rfl:     levothyroxine 50 mcg tablet, Take 50 mcg by mouth daily, Disp: , Rfl:     liothyronine (CYTOMEL) 5 mcg tablet, Take 5 mcg by mouth daily, Disp: , Rfl:     LORazepam (ATIVAN) 1 mg tablet, Take 1 tablet (1 mg total) by mouth every 8 (eight) hours as needed for anxiety, Disp: 21 tablet, Rfl: 0    montelukast (SINGULAIR) 10 mg tablet, Take 1 tablet (10 mg total) by mouth daily at bedtime, Disp: 30 tablet, Rfl: 0    NON FORMULARY, immunoglobin sq 6g 30 ml once a week, Disp: , Rfl:     pantoprazole (PROTONIX) 40 mg tablet, Take 1 tablet (40 mg total) by mouth daily, Disp: 30 tablet, Rfl: 0    polyethylene glycol (GLYCOLAX) 17 GM/SCOOP powder, Take 17 g by mouth daily as needed (constipation), Disp: 17 g, Rfl: 0    predniSONE 20 mg tablet, Take 2 tablets (40 mg total) by mouth daily (Patient taking differently: Take 5 mg by mouth once a week ), Disp: 30 tablet, Rfl: 0    Stelara 90 MG/ML subcutaneous injection, , Disp: , Rfl:     ustekinumab (Stelara) 90 mg/mL subcutaneous injection, Inject 1 mL (90 mg total) under the skin every 28 days (Patient taking differently: Inject 90 mg under the skin every 56 days ), Disp: 1 Syringe, Rfl: 6    calcium citrate (CALCITRATE) 950 (200 Ca) MG tablet, Take 1 tablet (950 mg total) by mouth daily (Patient not taking: Reported on 3/26/2021), Disp: 90 tablet, Rfl: 3    ergocalciferol (VITAMIN D2) 50,000 units, , Disp: , Rfl:     omalizumab (XOLAIR) 150 mg, Inject 300 mg under the skin every 28 days , Disp: , Rfl:     Riboflavin (Vitamin B-2) 100 MG TABS, Take 4 tablets (400 mg total) by mouth daily (Patient not taking: Reported on 3/3/2021), Disp: 360 tablet, Rfl: 3    Current Facility-Administered Medications:     cyanocobalamin injection 1,000 mcg, 1,000 mcg, Intramuscular, Q30 Days, Perla Ross PA-C, 1,000 mcg at 10/25/19 1204  Allergies   Allergen Reactions    Amitriptyline Anaphylaxis     According to the patient she had nphylaxis    Aspergillus Fumigatus Other (See Comments), Hives and Swelling    Azathioprine Other (See Comments) and Anaphylaxis     pancreatitis  According to patient she needed Epipen    Eggs Or Egg-Derived Products Anaphylaxis    Sumatriptan Anaphylaxis     Bradycardia, sob, back pressure, head pain,throat tightness  According to the patient she had anphylaxis    Contrast [Iodinated Diagnostic Agents]      Pt states needs to be premedicated prior to injection    Corn Dextrin      Any corn based products    Corn Oil Hives    Gelatin     Histamine      Intolerance      Humira [Adalimumab] Other (See Comments)     "I develop drug induced lupus"    Ibuprofen Hives and Throat Swelling    Lidocaine     Malto Dextrin [Dextrin]     Other      Gel caps, maltodextrose, dextrose,grapes    Penicillium Notatum     Remicade [Infliximab] Other (See Comments)     "I develop drug induced lupus"    Sulfa Antibiotics Hives and Other (See Comments)    Sulfate     Sulfites        Labs:     Chemistry        Component Value Date/Time     01/16/2015 1213    K 3 6 03/12/2021 0428    K 4 2 01/16/2015 1213     03/12/2021 0428     01/16/2015 1213    CO2 30 03/12/2021 0428    CO2 29 1 01/16/2015 1213    BUN 13 03/12/2021 0428    BUN 10 01/16/2015 1213    CREATININE 0 78 03/12/2021 0428    CREATININE 0 67 01/16/2015 1213        Component Value Date/Time    CALCIUM 9 2 03/12/2021 0428    CALCIUM 9 6 01/16/2015 1213    ALKPHOS 48 03/11/2021 0425    ALKPHOS 97 01/16/2015 1213    AST 16 03/11/2021 0425    AST 11 01/16/2015 1213    ALT 42 03/11/2021 0425    ALT 51 01/16/2015 1213    BILITOT 0 7 01/16/2015 1213            No results found for: CHOL  Lab Results   Component Value Date    HDL 75 10/25/2019    HDL 54 02/13/2019    HDL 61 (H) 12/06/2017     Lab Results   Component Value Date    LDLCALC 115 (H) 10/25/2019    LDLCALC 62 02/13/2019    LDLCALC 79 12/06/2017     Lab Results   Component Value Date    TRIG 52 10/25/2019    TRIG 84 02/13/2019    TRIG 45 12/06/2017     No results found for: CHOLHDL    Imaging: No results found  ECG:  Normal sinus rhythm unremarkable tracing      ROS    Vitals:    03/26/21 1419   BP: 112/78   Pulse: (!) 111     Vitals:    03/26/21 1419   Weight: 65 3 kg (144 lb)     Height: 5' 5" (165 1 cm)   Body mass index is 23 96 kg/m²  Physical Exam:  Vital signs reviewed  General:  Alert and cooperative, appears stated age, no acute distress  HEENT:  PERRLA, EOMI, no scleral icterus, no conjunctival pallor  Neck:  No lymphadenopathy, no thyromegaly, no carotid bruits, no elevated JVP  Heart:  Regular rate and rhythm, normal S1/S2, no S3/S4, no murmur, rubs or gallops  PMI nondisplaced  Lungs:  Clear to auscultation bilaterally, no wheezes rales or rhonchi  Abdomen:  Soft, non-tender, positive bowel sounds, no rebound or guarding,   no organomegaly   Extremities:  Normal range of motion    No clubbing, cyanosis or edema   Vascular:  2+ pedal pulses  Skin:  No rashes or lesions on exposed skin  Neurologic:  Cranial nerves II-XII grossly intact without focal deficits  Psych:  Normal mood and affect

## 2021-03-26 NOTE — TELEPHONE ENCOUNTER
Called patient back and let her know that Stelara prior auth for every 4 weeks is pending  I let her know that I called to check for updates and was told that they have 72 business days to get this approved so they won't have any answers until Monday 3/29/2021  The patient stated she will be calling the insurance herself to try and get it approved and shipped overnight

## 2021-03-29 NOTE — TELEPHONE ENCOUNTER
Medication was denied  Set up Peer to peer for Stelara 90mg every 4 weeks denial      Please call [phone] to set this up  Dr Jennifer Serrato would like to change the frequency of Stelara 90mg injections from every 8 weeks to every 4 weeks  Patient has been on Humira and Remicade in the past  Patient is currently on Stelara 90mg every 8 weeks and Stelara levels were low as of 3/12/2021

## 2021-03-30 ENCOUNTER — TELEPHONE (OUTPATIENT)
Dept: GASTROENTEROLOGY | Facility: CLINIC | Age: 46
End: 2021-03-30

## 2021-03-30 NOTE — TELEPHONE ENCOUNTER
----- Message from Hemalatha Rocha sent at 3/29/2021  3:21 PM EDT -----  Regarding: peer to peer for [place],     Please call to set up Peer to peer for Stelara 90mg every 4 weeks denial      Please call [phone] to set this up  Dr Jenn Wayne would like to change the frequency of Stelara 90mg injections from every 8 weeks to every 4 weeks  Patient has been on Humira and Remicade in the past  Patient is currently on Stelara 90mg every 8 weeks and Stelara levels were low as of 3/12/2021       Please assist      Thank you,     Carloz Anderson

## 2021-03-30 NOTE — TELEPHONE ENCOUNTER
I'm on for east side tasks for the rest of the week; not west side, but I can still do a peer to peer in the afternoon if that's ok (I have patients blocked for the morning)

## 2021-03-30 NOTE — TELEPHONE ENCOUNTER
Called and spoke with representative to schedule peer to peer  Peer to peer must be completed by advanced practitioner   Peer to peer will take place tomorrow afternoon and they will try to call around 3 pm  Reference number #[id number]

## 2021-03-31 NOTE — TELEPHONE ENCOUNTER
I spoke with Dr Maurice Kc for the peer to peer, and he informed me that the q 4 week dosing for Stelara should be approved, given the case details I provided for him    He said an approval number was not necessary but informed me he could be reached at [phone] if there are any issues with the approval   Thank you

## 2021-03-31 NOTE — TELEPHONE ENCOUNTER
I spoke with Dr Santy Joseph for the peer to peer, and he informed me that the q 4 week dosing for Stelara should be approved, given the case details I provided for him    He said an approval number was not necessary but informed me he could be reached at [phone] if there are any issues with the approval   Thank you

## 2021-03-31 NOTE — TELEPHONE ENCOUNTER
Called [employer] to provide a new verbal for Stelara 90mg every 4 weeks  Spoke to Brad MCINTOSH [employer] and she stated they will contact the patient for delivery

## 2021-04-01 ENCOUNTER — HOSPITAL ENCOUNTER (OUTPATIENT)
Dept: INFUSION CENTER | Facility: CLINIC | Age: 46
End: 2021-04-01

## 2021-04-08 ENCOUNTER — HOSPITAL ENCOUNTER (OUTPATIENT)
Dept: INFUSION CENTER | Facility: CLINIC | Age: 46
Discharge: HOME | End: 2021-04-08
Payer: COMMERCIAL

## 2021-04-08 VITALS
DIASTOLIC BLOOD PRESSURE: 86 MMHG | RESPIRATION RATE: 18 BRPM | HEART RATE: 88 BPM | TEMPERATURE: 98.2 F | SYSTOLIC BLOOD PRESSURE: 122 MMHG

## 2021-04-08 DIAGNOSIS — R19.7 DIARRHEA, UNSPECIFIED TYPE: ICD-10-CM

## 2021-04-08 DIAGNOSIS — E44.1 MILD PROTEIN-CALORIE MALNUTRITION (HCC): ICD-10-CM

## 2021-04-08 DIAGNOSIS — R63.0 ANOREXIA: Primary | ICD-10-CM

## 2021-04-08 PROCEDURE — 96361 HYDRATE IV INFUSION ADD-ON: CPT

## 2021-04-08 PROCEDURE — 96360 HYDRATION IV INFUSION INIT: CPT

## 2021-04-08 RX ORDER — SODIUM CHLORIDE, SODIUM LACTATE, POTASSIUM CHLORIDE, CALCIUM CHLORIDE 600; 310; 30; 20 MG/100ML; MG/100ML; MG/100ML; MG/100ML
1000 INJECTION, SOLUTION INTRAVENOUS ONCE
Status: CANCELLED
Start: 2021-04-15

## 2021-04-08 RX ORDER — SODIUM CHLORIDE, SODIUM LACTATE, POTASSIUM CHLORIDE, CALCIUM CHLORIDE 600; 310; 30; 20 MG/100ML; MG/100ML; MG/100ML; MG/100ML
1000 INJECTION, SOLUTION INTRAVENOUS ONCE
Status: COMPLETED | OUTPATIENT
Start: 2021-04-08 | End: 2021-04-08

## 2021-04-08 RX ADMIN — SODIUM CHLORIDE, SODIUM LACTATE, POTASSIUM CHLORIDE, AND CALCIUM CHLORIDE 1000 ML: .6; .31; .03; .02 INJECTION, SOLUTION INTRAVENOUS at 09:17

## 2021-04-09 ENCOUNTER — HOSPITAL ENCOUNTER (OUTPATIENT)
Dept: NON INVASIVE DIAGNOSTICS | Facility: HOSPITAL | Age: 46
Discharge: HOME | End: 2021-04-09
Attending: INTERNAL MEDICINE
Payer: COMMERCIAL

## 2021-04-09 DIAGNOSIS — R00.2 HEART PALPITATIONS: ICD-10-CM

## 2021-04-09 DIAGNOSIS — I50.30 DIASTOLIC CONGESTIVE HEART FAILURE, UNSPECIFIED HF CHRONICITY (HCC): ICD-10-CM

## 2021-04-09 PROCEDURE — 93306 TTE W/DOPPLER COMPLETE: CPT

## 2021-04-09 PROCEDURE — 93306 TTE W/DOPPLER COMPLETE: CPT | Performed by: INTERNAL MEDICINE

## 2021-04-14 ENCOUNTER — TELEPHONE (OUTPATIENT)
Dept: GASTROENTEROLOGY | Facility: AMBULARY SURGERY CENTER | Age: 46
End: 2021-04-14

## 2021-04-14 ENCOUNTER — CLINICAL SUPPORT (OUTPATIENT)
Dept: CARDIOLOGY CLINIC | Facility: HOSPITAL | Age: 46
End: 2021-04-14
Payer: COMMERCIAL

## 2021-04-14 DIAGNOSIS — R00.2 HEART PALPITATIONS: ICD-10-CM

## 2021-04-14 DIAGNOSIS — F51.04 PSYCHOPHYSIOLOGICAL INSOMNIA: Primary | ICD-10-CM

## 2021-04-14 DIAGNOSIS — I50.30 DIASTOLIC CONGESTIVE HEART FAILURE, UNSPECIFIED HF CHRONICITY (HCC): ICD-10-CM

## 2021-04-14 DIAGNOSIS — K52.9 INFLAMMATORY BOWEL DISEASE: ICD-10-CM

## 2021-04-14 PROCEDURE — 93244 EXT ECG>48HR<7D REV&INTERPJ: CPT | Performed by: INTERNAL MEDICINE

## 2021-04-14 RX ORDER — BUDESONIDE 3 MG/1
3 CAPSULE, COATED PELLETS ORAL 3 TIMES DAILY
Qty: 90 CAPSULE | Refills: 2 | Status: SHIPPED | OUTPATIENT
Start: 2021-04-14 | End: 2021-12-07 | Stop reason: HOSPADM

## 2021-04-14 RX ORDER — PANTOPRAZOLE SODIUM 40 MG/1
40 TABLET, DELAYED RELEASE ORAL DAILY
Qty: 30 TABLET | Refills: 3 | Status: SHIPPED | OUTPATIENT
Start: 2021-04-14 | End: 2021-08-23 | Stop reason: SDUPTHER

## 2021-04-14 NOTE — TELEPHONE ENCOUNTER
Saran Mace from [facility] called clarifying budesonide and pantoprazole refill  Per pharmacist, "pt stated she was supposed to continue these medications from the hospital and Dr Regan Moreno wants her to continue to taking"  [facility] Demi [facility]

## 2021-04-14 NOTE — TELEPHONE ENCOUNTER
Patient was recently in the hospital and her Temazepam RX was discontinue  She is asking that you refill for her     Her next appointment is 12/30/2021

## 2021-04-14 NOTE — TELEPHONE ENCOUNTER
Patients GI provider:  Dr Lu Ambrose    Number to return call: (  [phone]    Reason for call: Ángel Urrutia from [address] [place] [address] Cir called to verify if she nneds to be taking the budesonide and pantoprazole, if so she will needs refilss    Scheduled procedure/appointment date if applicable: Apt/procedure  na

## 2021-04-15 ENCOUNTER — HOSPITAL ENCOUNTER (OUTPATIENT)
Dept: INFUSION CENTER | Facility: CLINIC | Age: 46
Discharge: HOME | End: 2021-04-15
Payer: COMMERCIAL

## 2021-04-15 VITALS
DIASTOLIC BLOOD PRESSURE: 73 MMHG | TEMPERATURE: 98.5 F | HEART RATE: 86 BPM | RESPIRATION RATE: 18 BRPM | SYSTOLIC BLOOD PRESSURE: 109 MMHG

## 2021-04-15 DIAGNOSIS — E44.1 MILD PROTEIN-CALORIE MALNUTRITION (HCC): ICD-10-CM

## 2021-04-15 DIAGNOSIS — R63.0 ANOREXIA: Primary | ICD-10-CM

## 2021-04-15 DIAGNOSIS — R19.7 DIARRHEA, UNSPECIFIED TYPE: ICD-10-CM

## 2021-04-15 PROCEDURE — 96361 HYDRATE IV INFUSION ADD-ON: CPT

## 2021-04-15 PROCEDURE — 96360 HYDRATION IV INFUSION INIT: CPT

## 2021-04-15 RX ORDER — SODIUM CHLORIDE, SODIUM LACTATE, POTASSIUM CHLORIDE, CALCIUM CHLORIDE 600; 310; 30; 20 MG/100ML; MG/100ML; MG/100ML; MG/100ML
1000 INJECTION, SOLUTION INTRAVENOUS ONCE
Status: CANCELLED
Start: 2021-04-23

## 2021-04-15 RX ORDER — TEMAZEPAM 30 MG/1
30 CAPSULE ORAL
Qty: 30 CAPSULE | Refills: 5 | Status: SHIPPED | OUTPATIENT
Start: 2021-04-15 | End: 2021-12-29 | Stop reason: SDUPTHER

## 2021-04-15 RX ORDER — SODIUM CHLORIDE, SODIUM LACTATE, POTASSIUM CHLORIDE, CALCIUM CHLORIDE 600; 310; 30; 20 MG/100ML; MG/100ML; MG/100ML; MG/100ML
1000 INJECTION, SOLUTION INTRAVENOUS ONCE
Status: COMPLETED | OUTPATIENT
Start: 2021-04-15 | End: 2021-04-15

## 2021-04-15 RX ADMIN — SODIUM CHLORIDE, SODIUM LACTATE, POTASSIUM CHLORIDE, AND CALCIUM CHLORIDE 1000 ML: .6; .31; .03; .02 INJECTION, SOLUTION INTRAVENOUS at 11:35

## 2021-04-15 NOTE — PLAN OF CARE
Problem: Potential for Falls  Goal: Patient will remain free of falls  Description: INTERVENTIONS:  - Assess patient frequently for physical needs  -  Identify cognitive and physical deficits and behaviors that affect risk of falls    -  [place] fall precautions as indicated by assessment   - Educate patient/family on patient safety including physical limitations  - Instruct patient to call for assistance with activity based on assessment  - Modify environment to reduce risk of injury  - Consider OT/PT consult to assist with strengthening/mobility  Outcome: Progressing

## 2021-04-19 ENCOUNTER — TELEPHONE (OUTPATIENT)
Dept: GYNECOLOGY | Facility: CLINIC | Age: 46
End: 2021-04-19

## 2021-04-22 ENCOUNTER — HOSPITAL ENCOUNTER (OUTPATIENT)
Dept: BONE DENSITY | Facility: HOSPITAL | Age: 46
Discharge: HOME | End: 2021-04-22
Payer: COMMERCIAL

## 2021-04-22 DIAGNOSIS — Z79.52 CURRENT CHRONIC USE OF SYSTEMIC STEROIDS: ICD-10-CM

## 2021-04-22 PROCEDURE — 77080 DXA BONE DENSITY AXIAL: CPT

## 2021-04-23 ENCOUNTER — HOSPITAL ENCOUNTER (OUTPATIENT)
Dept: INFUSION CENTER | Facility: CLINIC | Age: 46
Discharge: HOME | End: 2021-04-23
Payer: COMMERCIAL

## 2021-04-23 VITALS
TEMPERATURE: 97.4 F | RESPIRATION RATE: 18 BRPM | HEART RATE: 77 BPM | SYSTOLIC BLOOD PRESSURE: 137 MMHG | DIASTOLIC BLOOD PRESSURE: 86 MMHG

## 2021-04-23 DIAGNOSIS — R63.0 ANOREXIA: Primary | ICD-10-CM

## 2021-04-23 DIAGNOSIS — R19.7 DIARRHEA, UNSPECIFIED TYPE: ICD-10-CM

## 2021-04-23 DIAGNOSIS — E44.1 MILD PROTEIN-CALORIE MALNUTRITION (HCC): ICD-10-CM

## 2021-04-23 PROCEDURE — 96360 HYDRATION IV INFUSION INIT: CPT

## 2021-04-23 PROCEDURE — 96361 HYDRATE IV INFUSION ADD-ON: CPT

## 2021-04-23 RX ORDER — SODIUM CHLORIDE, SODIUM LACTATE, POTASSIUM CHLORIDE, CALCIUM CHLORIDE 600; 310; 30; 20 MG/100ML; MG/100ML; MG/100ML; MG/100ML
1000 INJECTION, SOLUTION INTRAVENOUS ONCE
Status: COMPLETED | OUTPATIENT
Start: 2021-04-23 | End: 2021-04-23

## 2021-04-23 RX ORDER — SODIUM CHLORIDE, SODIUM LACTATE, POTASSIUM CHLORIDE, CALCIUM CHLORIDE 600; 310; 30; 20 MG/100ML; MG/100ML; MG/100ML; MG/100ML
1000 INJECTION, SOLUTION INTRAVENOUS ONCE
Status: CANCELLED
Start: 2021-04-29

## 2021-04-23 RX ADMIN — SODIUM CHLORIDE, SODIUM LACTATE, POTASSIUM CHLORIDE, AND CALCIUM CHLORIDE 1000 ML: .6; .31; .03; .02 INJECTION, SOLUTION INTRAVENOUS at 11:23

## 2021-04-23 NOTE — PLAN OF CARE
Problem: Potential for Falls  Goal: Patient will remain free of falls  Description: INTERVENTIONS:  - Assess patient frequently for physical needs  -  Identify cognitive and physical deficits and behaviors that affect risk of falls    -  [place] fall precautions as indicated by assessment   - Educate patient/family on patient safety including physical limitations  - Instruct patient to call for assistance with activity based on assessment  - Modify environment to reduce risk of injury  - Consider OT/PT consult to assist with strengthening/mobility  Outcome: Progressing

## 2021-04-23 NOTE — PROGRESS NOTES
Pt arrived to unit without complaint  Pt tolerated hydration without incident  AVS declined, but aware of future appts  Pt left unit in stable condition

## 2021-04-28 ENCOUNTER — IMMUNIZATIONS (OUTPATIENT)
Dept: FAMILY MEDICINE CLINIC | Facility: HOSPITAL | Age: 46
End: 2021-04-28

## 2021-04-28 DIAGNOSIS — Z23 ENCOUNTER FOR IMMUNIZATION: Primary | ICD-10-CM

## 2021-04-28 PROCEDURE — 0012A SARS-COV-2 / COVID-19 MRNA VACCINE (MODERNA) 100 MCG: CPT

## 2021-04-28 PROCEDURE — 91301 SARS-COV-2 / COVID-19 MRNA VACCINE (MODERNA) 100 MCG: CPT

## 2021-05-04 ENCOUNTER — TELEMEDICINE (OUTPATIENT)
Dept: GASTROENTEROLOGY | Facility: MEDICAL CENTER | Age: 46
End: 2021-05-04
Payer: MEDICARE

## 2021-05-04 ENCOUNTER — TELEMEDICINE (OUTPATIENT)
Dept: CARDIOLOGY CLINIC | Facility: HOSPITAL | Age: 46
End: 2021-05-04
Payer: COMMERCIAL

## 2021-05-04 VITALS — HEIGHT: 64 IN | BODY MASS INDEX: 24.24 KG/M2 | WEIGHT: 142 LBS

## 2021-05-04 DIAGNOSIS — K59.00 CONSTIPATION, UNSPECIFIED CONSTIPATION TYPE: ICD-10-CM

## 2021-05-04 DIAGNOSIS — I50.30 DIASTOLIC CONGESTIVE HEART FAILURE, UNSPECIFIED HF CHRONICITY (HCC): Primary | ICD-10-CM

## 2021-05-04 DIAGNOSIS — R00.2 HEART PALPITATIONS: ICD-10-CM

## 2021-05-04 DIAGNOSIS — K50.119 CROHN'S DISEASE OF COLON WITH COMPLICATION (HCC): Primary | ICD-10-CM

## 2021-05-04 DIAGNOSIS — K52.9 INFLAMMATORY BOWEL DISEASE: ICD-10-CM

## 2021-05-04 DIAGNOSIS — D75.839 THROMBOCYTOSIS: ICD-10-CM

## 2021-05-04 DIAGNOSIS — E44.1 MILD PROTEIN-CALORIE MALNUTRITION (HCC): ICD-10-CM

## 2021-05-04 DIAGNOSIS — D84.9 IMMUNOSUPPRESSED STATUS (HCC): ICD-10-CM

## 2021-05-04 DIAGNOSIS — Z79.52 CURRENT CHRONIC USE OF SYSTEMIC STEROIDS: ICD-10-CM

## 2021-05-04 DIAGNOSIS — R10.11 RIGHT UPPER QUADRANT ABDOMINAL PAIN: ICD-10-CM

## 2021-05-04 PROCEDURE — 99213 OFFICE O/P EST LOW 20 MIN: CPT | Performed by: INTERNAL MEDICINE

## 2021-05-04 PROCEDURE — 99442 PR PHYS/QHP TELEPHONE EVALUATION 11-20 MIN: CPT | Performed by: INTERNAL MEDICINE

## 2021-05-04 NOTE — PROGRESS NOTES
Virtual Brief Visit    Assessment/Plan: she called today to review her testing  She was unable to come into the office due to acute illness  Echocardiogram was structurally unremarkable  Extended Holter monitor showed episodes of atrial tachycardia that correlated with her symptoms  Continue to stay hydrated read reduce caffeine and alcohol  She has already been feeling better  These episodes are sparse and I do not feel warrant additional medical therapy at this time follow up with her in 8 months  Problem List Items Addressed This Visit        Cardiovascular and Mediastinum    CHF (congestive heart failure) ([facility] ) - Primary       Hematopoietic and Hemostatic    Thrombocytosis ([facility] )       Other    Heart palpitations                Reason for visit is   Chief Complaint   Patient presents with    Virtual Regular Visit     follow up    Virtual Brief Visit        Encounter provider Liliana Díaz DO    Provider located at [address] [place] [id number]    Recent Visits  No visits were found meeting these conditions  Showing recent visits within past 7 days and meeting all other requirements     Today's Visits  Date Type Provider Dept   05/04/21 Telemedicine [facility]   Showing today's visits and meeting all other requirements     Future Appointments  No visits were found meeting these conditions  Showing future appointments within next 150 days and meeting all other requirements        After connecting through telephone, the patient was identified by name and date of birth  Bashir Elisa was informed that this is a telemedicine visit and that the visit is being conducted through telephone  My office door was closed  No one else was in the room  She acknowledged consent and understanding of privacy and security of the platform   The patient has agreed to participate and understands she can discontinue the visit at any time  Patient is aware this is a billable service  Subjective    Otis Drew is a 48 y o  female   Who was presented for an acute virtual visit today due to medical illness and need to go over testing results  Palpitations have improved  Ambulatory Holter monitors we with the patient  Denies any chest pain, lightheadedness, dizziness  There has been no lower extremity edema, PND, orthopnea  [place] HPI     Past Medical History:   Diagnosis Date    Anxiety     Asthma     Chronic kidney disease     Deep vein thrombosis (HCC)     Disease of thyroid gland     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    Generalized anxiety disorder 8/26/2017    Heart murmur     Migraine     Myocardial infarction [facility])     NSTEMI  pt denies, documented in cardio note    Pancreatitis     sphinger of     Psychiatric disorder     RA [facility]heumatoid arthritis) [facility] )     Ulcerative colitis ([facility] )        Past Surgical History:   Procedure Laterality Date    APPENDECTOMY      CHOLECYSTECTOMY      EGD AND COLONOSCOPY N/A 9/12/2017    Procedure: EGD AND COLONOSCOPY;  Surgeon: Caty Delgadillo MD;  [facility]; Service: Gastroenterology    ERCP N/A 9/15/2017    Procedure: ENDOSCOPIC RETROGRADE CHOLANGIOPANCREATOGRAPHY (ERCP); Surgeon: Benji Mcdonough MD;  [facility];   Service: Gastroenterology    HYSTERECTOMY      KNEE SURGERY Right     LAPAROSCOPIC ENDOMETRIOSIS FULGURATION      ORIF ANKLE FRACTURE Left     VA KNEE SCOPE,MED/LAT MENISECTOMY Left 5/12/2017    Procedure: POSSIBLE MEDIAL MENISECTOMY;  Surgeon: Rommel Castellanos MD;  [facility];  Service: Orthopedics    VA KNEE [address] CART Left 5/12/2017    Procedure: KNEE ARTHROSCOPY EVALUATION, CHONDROPLASTY;  Surgeon: Rommel Castellanos MD;  [facility];  Service: Orthopedics    VA LAP,DIAGNOSTIC ABDOMEN N/A 12/12/2017    Procedure: LAPAROSCOPY DIAGNOSTIC  WITH LYSIS OF ADHESIONS;  Surgeon: Janice Elliott DO;  [facility];  Service: General       Current Outpatient Medications   Medication Sig Dispense Refill    acetaminophen (TYLENOL) 500 mg tablet Take 1,000 mg by mouth every 6 (six) hours as needed for mild pain      budesonide (ENTOCORT EC) 3 MG capsule Take 1 capsule (3 mg total) by mouth 3 (three) times a day Before meals 90 capsule 2    busPIRone (BUSPAR) 5 mg tablet TAKE ONE TABLET BY MOUTH EVERY 24 HOURS 30 tablet 0    butalbital-acetaminophen-caffeine (FIORICET,ESGIC) -40 mg per tablet Take 1 tablet by mouth every 8 (eight) hours as needed for headaches 20 tablet 0    calcium citrate (CALCITRATE) 950 (200 Ca) MG tablet Take 1 tablet (950 mg total) by mouth daily 90 tablet 3    cetirizine (ZyrTEC) 10 mg tablet Take 20 mg by mouth daily       cholecalciferol (VITAMIN D) 400 units/mL Take 2 5 mL (1,000 Units total) by mouth daily (Patient taking differently: Take 1,200 Units by mouth daily ) 1 Bottle 5    cromolyn (GASTROCROM) 100 MG/5ML solution   Start: 11/16/20 8:54:00 EST, only when in Green zone 2 vials bid      cyanocobalamin 1,000 mcg/mL       diclofenac (VOLTAREN) 75 mg EC tablet Take 1 tablet (75 mg total) by mouth 2 (two) times a day for 21 days (Patient taking differently: Take 75 mg by mouth as needed ) 42 tablet 2    diphenhydrAMINE (BENADRYL) 25 mg tablet Take 50 mg by mouth 2 (two) times a day       EPINEPHrine (EPIPEN 2-MARYSOL IJ) EpiPen   prn      ergocalciferol (VITAMIN D2) 50,000 units       ergocalciferol (VITAMIN D2) 50,000 units   Start: 02/22/21 17:37:00 EST, See Instructions, 50,000 Int_Unit PO 1 time per week      hydrOXYzine HCL (ATARAX) 25 mg tablet Take 25 mg by mouth 4 (four) times a day       levalbuterol (XOPENEX HFA) 45 mcg/act inhaler Inhale 1-2 puffs every 4 (four) hours as needed for shortness of breath      levothyroxine 50 mcg tablet Take 50 mcg by mouth daily      liothyronine (CYTOMEL) 5 mcg tablet Take 5 mcg by mouth daily      LORazepam (ATIVAN) 1 mg tablet Take 1 tablet (1 mg total) by mouth every 8 (eight) hours as needed for anxiety 21 tablet 0    montelukast (SINGULAIR) 10 mg tablet Take 1 tablet (10 mg total) by mouth daily at bedtime 30 tablet 0    NON FORMULARY immunoglobin sq 6g 30 ml once a week      omalizumab (XOLAIR) 150 mg Inject 300 mg under the skin every 28 days       pantoprazole (PROTONIX) 40 mg tablet Take 1 tablet (40 mg total) by mouth daily 30 tablet 3    polyethylene glycol (GLYCOLAX) 17 GM/SCOOP powder Take 17 g by mouth daily as needed (constipation) 17 g 0    predniSONE 20 mg tablet Take 2 tablets (40 mg total) by mouth daily (Patient taking differently: Take 5 mg by mouth once a week ) 30 tablet 0    Riboflavin (Vitamin B-2) 100 MG TABS Take 4 tablets (400 mg total) by mouth daily 360 tablet 3    Stelara 90 MG/ML subcutaneous injection       temazepam (RESTORIL) 30 mg capsule Take 1 capsule (30 mg total) by mouth daily at bedtime as needed for sleep 30 capsule 5    ustekinumab (Stelara) 90 mg/mL subcutaneous injection Inject 1 mL (90 mg total) under the skin every 28 days (Patient taking differently: Inject 90 mg under the skin every 56 days ) 1 Syringe 6     Current Facility-Administered Medications   Medication Dose Route Frequency Provider Last Rate Last Admin    cyanocobalamin injection 1,000 mcg  1,000 mcg Intramuscular Q30 Days Christian Harper PA-C   1,000 mcg at 10/25/19 1204        Allergies   Allergen Reactions    Amitriptyline Anaphylaxis     According to the patient she had nphylaxis    Aspergillus Fumigatus Other (See Comments), Hives and Swelling    Azathioprine Other (See Comments) and Anaphylaxis     pancreatitis  According to patient she needed Epipen    Eggs Or Egg-Derived Products - Food Allergy Anaphylaxis    Sumatriptan Anaphylaxis     Bradycardia, sob, back pressure, head pain,throat tightness  According to the patient she had anphylaxis    Contrast [Iodinated Diagnostic Agents]      Pt states needs to be premedicated prior to injection    Corn Dextrin      Any corn based products    [place] Oil - Food Allergy - Food Allergy Hives    Gelatin - Food Allergy     Histamine      Intolerance      Humira [Adalimumab] Other (See Comments)     "I develop drug induced lupus"    Ibuprofen Hives and Throat Swelling    Lidocaine     Malto Dextrin [Dextrin]     Other      Gel caps, maltodextrose, dextrose,grapes    Penicillium Notatum     Remicade [Infliximab] Other (See Comments)     "I develop drug induced lupus"    Sulfa Antibiotics Hives and Other (See Comments)    Sulfate     Sulfites - Food Allergy        Review of Systems    Vitals:    05/04/21 0923   Weight: 64 4 kg (142 lb)   Height: 5' 4" (1 626 m)         I spent 20 minutes directly with the patient during this visit    VIRTUAL VISIT DISCLAIMER    Kumar Green acknowledges that she has consented to an online visit or consultation  She understands that the online visit is based solely on information provided by her, and that, in the absence of a face-to-face physical evaluation by the physician, the diagnosis she receives is both limited and provisional in terms of accuracy and completeness  This is not intended to replace a full medical face-to-face evaluation by the physician  Kumar Green understands and accepts these terms

## 2021-05-04 NOTE — PROGRESS NOTES
Virtual Regular Visit      Assessment/Plan:    The patient is a 49 yo woman with multiple medical problems including IBD (thought to be indeterminate, Crohn's vs UC), possible MCAS, underlying immunodeficiency for which she receives IVIG for follow-up  Symptoms improved on stelara every 4 weeks, but as she gets closer to her next injection she notes worsening symptoms  There is active IBD, but there may also be a functional component (such as SIBO, IBS)  Recent CMP with low albumin (2 9), CBC with WBC 16 22 and Hgb 13 7    Problem List Items Addressed This Visit        Digestive    Inflammatory bowel disease       Other    Abdominal pain    Immunosuppressed status ([facility] )    Relevant Orders    MISCELLANEOUS LAB TEST    CBC and differential    Comprehensive metabolic panel    C-reactive protein    Quantiferon TB Gold Plus    Constipation    Mild protein-calorie malnutrition (HCC)    Relevant Orders    MISCELLANEOUS LAB TEST    CBC and differential    Comprehensive metabolic panel    C-reactive protein    Quantiferon TB Gold Plus    Current chronic use of systemic steroids      Other Visit Diagnoses     Crohn's disease of colon with complication ([facility] )    -  Primary    Relevant Orders    MISCELLANEOUS LAB TEST    CBC and differential    Comprehensive metabolic panel    C-reactive protein    Quantiferon TB Gold Plus        Continue stelara every 4 weeks  Recheck a Stelara level in 2 months  CBC, CMP, CRP  Repeat quant gold  Continue fluid hydration  Follow-up with allergy (scheduled for July)     Wean prednisone slowly  Eventually wean budesonide           Reason for visit is   Chief Complaint   Patient presents with    Virtual Regular Visit        Encounter provider Andrez Rolle MD    Provider located at [address] [id number]      Recent Visits  Date Type Provider Dept   05/04/21 [place], MD [facility] [place]   Showing recent visits within past 7 days and meeting all other requirements     Future Appointments  No visits were found meeting these conditions  Showing future appointments within next 150 days and meeting all other requirements        The patient was identified by name and date of birth  Jamal Díaz was informed that this is a telemedicine visit and that the visit is being conducted through telephone  My office door was closed  No one else was in the room  She acknowledged consent and understanding of privacy and security of the video platform  The patient has agreed to participate and understands they can discontinue the visit at any time  Patient is aware this is a billable service  It was my intent to perform this visit via video technology but the patient was not able to do a video connection so the visit was completed via audio telephone only  Subjective  Jamal Díaz is a 48 y o  female with IBD here for follow-up  She is doing well  Watching her medication and diet  For the most part she is doing well  1 week before he stelara she starts to have issues and did not yet back bounce  She is getting it every 4 weeks  BMs mostly good, 1-2 per day  Sometimes she takes some miralax when constipated or bloated  She is mostly off the benadryl  She is on Atarax, Zyrtec  She is on 5mg of prednisone and pantoprazole, and 1 budesonide  Her hair loss stopped and it is growing back  Weight stable and maintaining     She is sleeping okay      Past Medical History:   Diagnosis Date    Anxiety     Asthma     Chronic kidney disease     Deep vein thrombosis ([address] )     Disease of thyroid gland     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    Generalized anxiety disorder 8/26/2017    Heart murmur     Migraine     Myocardial infarction [facility])     NSTEMI  pt denies, documented in cardio note    Pancreatitis     sphinger of     Psychiatric disorder     RA [facility]rheumatoid arthritis) ([facility] )     Ulcerative colitis ([facility] [address] )        Past Surgical History:   Procedure Laterality Date    APPENDECTOMY      CHOLECYSTECTOMY      EGD AND COLONOSCOPY N/A 9/12/2017    Procedure: EGD AND COLONOSCOPY;  Surgeon: Sathya Alvarez MD;  [facility]; Service: Gastroenterology    ERCP N/A 9/15/2017    Procedure: ENDOSCOPIC RETROGRADE CHOLANGIOPANCREATOGRAPHY (ERCP); Surgeon: Dougie Henson MD;  [facility];   Service: Gastroenterology    HYSTERECTOMY      KNEE SURGERY Right     LAPAROSCOPIC ENDOMETRIOSIS FULGURATION      ORIF ANKLE FRACTURE Left     AK KNEE SCOPE,MED/LAT MENISECTOMY Left 5/12/2017    Procedure: POSSIBLE MEDIAL MENISECTOMY;  Surgeon: Nick Florence MD;  [facility];  Service: Orthopedics    [place] KNEE [address] CART Left 5/12/2017    Procedure: KNEE ARTHROSCOPY EVALUATION, CHONDROPLASTY;  Surgeon: Nick Florence MD;  [facility];  Service: Orthopedics    AK LAP,DIAGNOSTIC ABDOMEN N/A 12/12/2017    Procedure: LAPAROSCOPY DIAGNOSTIC  WITH LYSIS OF ADHESIONS;  Surgeon: Jason Demarco DO;  [facility];  Service: General       Current Outpatient Medications   Medication Sig Dispense Refill    acetaminophen (TYLENOL) 500 mg tablet Take 1,000 mg by mouth every 6 (six) hours as needed for mild pain      budesonide (ENTOCORT EC) 3 MG capsule Take 1 capsule (3 mg total) by mouth 3 (three) times a day Before meals 90 capsule 2    busPIRone (BUSPAR) 5 mg tablet TAKE ONE TABLET BY MOUTH EVERY 24 HOURS 30 tablet 0    butalbital-acetaminophen-caffeine (FIORICET,ESGIC) -40 mg per tablet Take 1 tablet by mouth every 8 (eight) hours as needed for headaches 20 tablet 0    calcium citrate (CALCITRATE) 950 (200 Ca) MG tablet Take 1 tablet (950 mg total) by mouth daily 90 tablet 3    cetirizine (ZyrTEC) 10 mg tablet Take 20 mg by mouth daily       cholecalciferol (VITAMIN D) 400 units/mL Take 2 5 mL (1,000 Units total) by mouth daily (Patient taking differently: Take 1,200 Units by mouth daily ) 1 Bottle 5    cromolyn (GASTROCROM) 100 MG/5ML solution   Start: 11/16/20 8:54:00 EST, only when in Green zone 2 vials bid      cyanocobalamin 1,000 mcg/mL       diclofenac (VOLTAREN) 75 mg EC tablet Take 1 tablet (75 mg total) by mouth 2 (two) times a day for 21 days (Patient taking differently: Take 75 mg by mouth as needed ) 42 tablet 2    diphenhydrAMINE (BENADRYL) 25 mg tablet Take 50 mg by mouth 2 (two) times a day       EPINEPHrine (EPIPEN 2-MARYSOL IJ) EpiPen   prn      ergocalciferol (VITAMIN D2) 50,000 units       ergocalciferol (VITAMIN D2) 50,000 units   Start: 02/22/21 17:37:00 EST, See Instructions, 50,000 Int_Unit PO 1 time per week      hydrOXYzine HCL (ATARAX) 25 mg tablet Take 25 mg by mouth 4 (four) times a day       levalbuterol (XOPENEX HFA) 45 mcg/act inhaler Inhale 1-2 puffs every 4 (four) hours as needed for shortness of breath      levothyroxine 50 mcg tablet Take 50 mcg by mouth daily      liothyronine (CYTOMEL) 5 mcg tablet Take 5 mcg by mouth daily      LORazepam (ATIVAN) 1 mg tablet Take 1 tablet (1 mg total) by mouth every 8 (eight) hours as needed for anxiety 21 tablet 0    montelukast (SINGULAIR) 10 mg tablet Take 1 tablet (10 mg total) by mouth daily at bedtime 30 tablet 0    NON FORMULARY immunoglobin sq 6g 30 ml once a week      omalizumab (XOLAIR) 150 mg Inject 300 mg under the skin every 28 days       pantoprazole (PROTONIX) 40 mg tablet Take 1 tablet (40 mg total) by mouth daily 30 tablet 3    polyethylene glycol (GLYCOLAX) 17 GM/SCOOP powder Take 17 g by mouth daily as needed (constipation) 17 g 0    predniSONE 20 mg tablet Take 2 tablets (40 mg total) by mouth daily (Patient taking differently: Take 5 mg by mouth once a week ) 30 tablet 0    Riboflavin (Vitamin B-2) 100 MG TABS Take 4 tablets (400 mg total) by mouth daily 360 tablet 3    Stelara 90 MG/ML subcutaneous injection       temazepam (RESTORIL) 30 mg capsule Take 1 capsule (30 mg total) by mouth daily at bedtime as needed for sleep 30 capsule 5    ustekinumab (Stelara) 90 mg/mL subcutaneous injection Inject 1 mL (90 mg total) under the skin every 28 days (Patient taking differently: Inject 90 mg under the skin every 56 days ) 1 Syringe 6     Current Facility-Administered Medications   Medication Dose Route Frequency Provider Last Rate Last Admin    cyanocobalamin injection 1,000 mcg  1,000 mcg Intramuscular Q30 Days Arely Del Angel PA-C   1,000 mcg at 10/25/19 1204        Allergies   Allergen Reactions    Amitriptyline Anaphylaxis     According to the patient she had nphylaxis    Aspergillus Fumigatus Other (See Comments), Hives and Swelling    Azathioprine Other (See Comments) and Anaphylaxis     pancreatitis  According to patient she needed Epipen    Eggs Or Egg-Derived Products - Food Allergy Anaphylaxis    Sumatriptan Anaphylaxis     Bradycardia, sob, back pressure, head pain,throat tightness  According to the patient she had anphylaxis    Contrast [Iodinated Diagnostic Agents]      Pt states needs to be premedicated prior to injection    Corn Dextrin      Any corn based products    [place] Oil - Food Allergy - Food Allergy Hives    Gelatin - Food Allergy     Histamine      Intolerance      Humira [Adalimumab] Other (See Comments)     "I develop drug induced lupus"    Ibuprofen Hives and Throat Swelling    Lidocaine     Malto Dextrin [Dextrin]     Other      Gel caps, maltodextrose, dextrose,grapes    Penicillium Notatum     Remicade [Infliximab] Other (See Comments)     "I develop drug induced lupus"    Sulfa Antibiotics Hives and Other (See Comments)    Sulfate     Sulfites - Food Allergy        REVIEW OF SYSTEMS:  10 point ROS reviewed and negative, except as above      PHYSICAL EXAMINATION:  Unable to perform physical examination given the unavailability of a video application           I spent 20 minutes directly with the patient during this visit      [place] acknowledges that she has consented to an online visit or consultation  She understands that the online visit is based solely on information provided by her, and that, in the absence of a face-to-face physical evaluation by the physician, the diagnosis she receives is both limited and provisional in terms of accuracy and completeness  This is not intended to replace a full medical face-to-face evaluation by the physician  Chelsie Villa understands and accepts these terms

## 2021-05-06 ENCOUNTER — HOSPITAL ENCOUNTER (OUTPATIENT)
Dept: INFUSION CENTER | Facility: CLINIC | Age: 46
Discharge: HOME | End: 2021-05-06

## 2021-05-13 ENCOUNTER — HOSPITAL ENCOUNTER (OUTPATIENT)
Dept: INFUSION CENTER | Facility: CLINIC | Age: 46
Discharge: HOME | End: 2021-05-13
Payer: COMMERCIAL

## 2021-05-13 VITALS
HEART RATE: 91 BPM | DIASTOLIC BLOOD PRESSURE: 74 MMHG | TEMPERATURE: 98.2 F | SYSTOLIC BLOOD PRESSURE: 114 MMHG | RESPIRATION RATE: 18 BRPM

## 2021-05-13 DIAGNOSIS — E44.1 MILD PROTEIN-CALORIE MALNUTRITION (HCC): ICD-10-CM

## 2021-05-13 DIAGNOSIS — R19.7 DIARRHEA, UNSPECIFIED TYPE: ICD-10-CM

## 2021-05-13 DIAGNOSIS — R63.0 ANOREXIA: Primary | ICD-10-CM

## 2021-05-13 PROCEDURE — 96360 HYDRATION IV INFUSION INIT: CPT

## 2021-05-13 PROCEDURE — 96361 HYDRATE IV INFUSION ADD-ON: CPT

## 2021-05-13 RX ORDER — SODIUM CHLORIDE, SODIUM LACTATE, POTASSIUM CHLORIDE, CALCIUM CHLORIDE 600; 310; 30; 20 MG/100ML; MG/100ML; MG/100ML; MG/100ML
1000 INJECTION, SOLUTION INTRAVENOUS ONCE
Status: COMPLETED | OUTPATIENT
Start: 2021-05-13 | End: 2021-05-13

## 2021-05-13 RX ORDER — SODIUM CHLORIDE, SODIUM LACTATE, POTASSIUM CHLORIDE, CALCIUM CHLORIDE 600; 310; 30; 20 MG/100ML; MG/100ML; MG/100ML; MG/100ML
1000 INJECTION, SOLUTION INTRAVENOUS ONCE
Status: CANCELLED
Start: 2021-05-20

## 2021-05-13 RX ADMIN — SODIUM CHLORIDE, SODIUM LACTATE, POTASSIUM CHLORIDE, AND CALCIUM CHLORIDE 1000 ML: .6; .31; .03; .02 INJECTION, SOLUTION INTRAVENOUS at 11:15

## 2021-05-20 ENCOUNTER — HOSPITAL ENCOUNTER (OUTPATIENT)
Dept: INFUSION CENTER | Facility: CLINIC | Age: 46
End: 2021-05-20

## 2021-05-21 DIAGNOSIS — K50.119 CROHN'S DISEASE OF COLON WITH COMPLICATION (HCC): Primary | ICD-10-CM

## 2021-05-21 DIAGNOSIS — R00.2 PALPITATION: ICD-10-CM

## 2021-05-21 RX ORDER — BUTALBITAL, ACETAMINOPHEN AND CAFFEINE 50; 325; 40 MG/1; MG/1; MG/1
TABLET ORAL
Qty: 20 TABLET | Refills: 4 | Status: SHIPPED | OUTPATIENT
Start: 2021-05-21 | End: 2022-03-21 | Stop reason: SDUPTHER

## 2021-05-25 ENCOUNTER — TRANSCRIBE ORDERS (OUTPATIENT)
Dept: ADMINISTRATIVE | Facility: HOSPITAL | Age: 46
End: 2021-05-25

## 2021-05-25 ENCOUNTER — APPOINTMENT (OUTPATIENT)
Dept: LAB | Facility: HOSPITAL | Age: 46
End: 2021-05-25
Attending: INTERNAL MEDICINE
Payer: COMMERCIAL

## 2021-05-25 DIAGNOSIS — K50.119 CROHN'S DISEASE OF COLON WITH COMPLICATION (HCC): ICD-10-CM

## 2021-05-25 DIAGNOSIS — D84.9 IMMUNOSUPPRESSED STATUS (HCC): ICD-10-CM

## 2021-05-25 DIAGNOSIS — D47.3 THROMBOCYTHEMIA, ESSENTIAL (HCC): ICD-10-CM

## 2021-05-25 DIAGNOSIS — D72.828 CHRONIC NEUTROPHILIA: Primary | ICD-10-CM

## 2021-05-25 DIAGNOSIS — D72.828 CHRONIC NEUTROPHILIA: ICD-10-CM

## 2021-05-25 DIAGNOSIS — E44.1 MILD PROTEIN-CALORIE MALNUTRITION (HCC): ICD-10-CM

## 2021-05-25 LAB
ALBUMIN SERPL BCP-MCNC: 3.8 G/DL (ref 3.5–5)
ALP SERPL-CCNC: 56 U/L (ref 46–116)
ALT SERPL W P-5'-P-CCNC: 20 U/L (ref 12–78)
ANION GAP SERPL CALCULATED.3IONS-SCNC: 8 MMOL/L (ref 4–13)
AST SERPL W P-5'-P-CCNC: 18 U/L (ref 5–45)
BASOPHILS # BLD AUTO: 0.16 THOUSANDS/ΜL (ref 0–0.1)
BASOPHILS NFR BLD AUTO: 3 % (ref 0–1)
BILIRUB SERPL-MCNC: 0.24 MG/DL (ref 0.2–1)
BUN SERPL-MCNC: 14 MG/DL (ref 5–25)
CALCIUM SERPL-MCNC: 9.4 MG/DL (ref 8.3–10.1)
CHLORIDE SERPL-SCNC: 101 MMOL/L (ref 100–108)
CO2 SERPL-SCNC: 30 MMOL/L (ref 21–32)
CREAT SERPL-MCNC: 0.66 MG/DL (ref 0.6–1.3)
CRP SERPL QL: <0.5 MG/L
EOSINOPHIL # BLD AUTO: 0.09 THOUSAND/ΜL (ref 0–0.61)
EOSINOPHIL NFR BLD AUTO: 2 % (ref 0–6)
ERYTHROCYTE [DISTWIDTH] IN BLOOD BY AUTOMATED COUNT: 13.4 % (ref 11.6–15.1)
GFR SERPL CREATININE-BSD FRML MDRD: 105 ML/MIN/1.73SQ M
GLUCOSE SERPL-MCNC: 86 MG/DL (ref 65–140)
HCT VFR BLD AUTO: 47.2 % (ref 34.8–46.1)
HGB BLD-MCNC: 14.8 G/DL (ref 11.5–15.4)
IMM GRANULOCYTES # BLD AUTO: 0.01 THOUSAND/UL (ref 0–0.2)
IMM GRANULOCYTES NFR BLD AUTO: 0 % (ref 0–2)
LYMPHOCYTES # BLD AUTO: 2.16 THOUSANDS/ΜL (ref 0.6–4.47)
LYMPHOCYTES NFR BLD AUTO: 41 % (ref 14–44)
MCH RBC QN AUTO: 30.3 PG (ref 26.8–34.3)
MCHC RBC AUTO-ENTMCNC: 31.4 G/DL (ref 31.4–37.4)
MCV RBC AUTO: 97 FL (ref 82–98)
MONOCYTES # BLD AUTO: 0.48 THOUSAND/ΜL (ref 0.17–1.22)
MONOCYTES NFR BLD AUTO: 9 % (ref 4–12)
NEUTROPHILS # BLD AUTO: 2.36 THOUSANDS/ΜL (ref 1.85–7.62)
NEUTS SEG NFR BLD AUTO: 45 % (ref 43–75)
NRBC BLD AUTO-RTO: 0 /100 WBCS
PLATELET # BLD AUTO: 384 THOUSANDS/UL (ref 149–390)
PMV BLD AUTO: 9.5 FL (ref 8.9–12.7)
POTASSIUM SERPL-SCNC: 3.7 MMOL/L (ref 3.5–5.3)
PROT SERPL-MCNC: 7.6 G/DL (ref 6.4–8.2)
RBC # BLD AUTO: 4.88 MILLION/UL (ref 3.81–5.12)
SODIUM SERPL-SCNC: 139 MMOL/L (ref 136–145)
WBC # BLD AUTO: 5.26 THOUSAND/UL (ref 4.31–10.16)

## 2021-05-25 PROCEDURE — 82397 CHEMILUMINESCENT ASSAY: CPT

## 2021-05-25 PROCEDURE — 86140 C-REACTIVE PROTEIN: CPT

## 2021-05-25 PROCEDURE — 86480 TB TEST CELL IMMUN MEASURE: CPT

## 2021-05-25 PROCEDURE — 81207 BCR/ABL1 GENE MINOR BP: CPT

## 2021-05-25 PROCEDURE — 85025 COMPLETE CBC W/AUTO DIFF WBC: CPT

## 2021-05-25 PROCEDURE — 36415 COLL VENOUS BLD VENIPUNCTURE: CPT

## 2021-05-25 PROCEDURE — 80299 QUANTITATIVE ASSAY DRUG: CPT

## 2021-05-25 PROCEDURE — 80053 COMPREHEN METABOLIC PANEL: CPT

## 2021-05-25 PROCEDURE — 81279 JAK2 GENE TRGT SEQUENCE ALYS: CPT

## 2021-05-25 PROCEDURE — 81206 BCR/ABL1 GENE MAJOR BP: CPT

## 2021-05-27 ENCOUNTER — HOSPITAL ENCOUNTER (OUTPATIENT)
Dept: INFUSION CENTER | Facility: CLINIC | Age: 46
Discharge: HOME | End: 2021-05-27
Payer: COMMERCIAL

## 2021-05-27 VITALS
OXYGEN SATURATION: 100 % | SYSTOLIC BLOOD PRESSURE: 120 MMHG | TEMPERATURE: 98.5 F | HEART RATE: 83 BPM | DIASTOLIC BLOOD PRESSURE: 84 MMHG | RESPIRATION RATE: 18 BRPM

## 2021-05-27 DIAGNOSIS — R19.7 DIARRHEA, UNSPECIFIED TYPE: ICD-10-CM

## 2021-05-27 DIAGNOSIS — R63.0 ANOREXIA: Primary | ICD-10-CM

## 2021-05-27 DIAGNOSIS — E44.1 MILD PROTEIN-CALORIE MALNUTRITION (HCC): ICD-10-CM

## 2021-05-27 LAB
GAMMA INTERFERON BACKGROUND BLD IA-ACNC: 0.02 IU/ML
M TB IFN-G BLD-IMP: NEGATIVE
M TB IFN-G CD4+ BCKGRND COR BLD-ACNC: 0 IU/ML
M TB IFN-G CD4+ BCKGRND COR BLD-ACNC: 0 IU/ML
MITOGEN IGNF BCKGRD COR BLD-ACNC: >10 IU/ML

## 2021-05-27 PROCEDURE — 96361 HYDRATE IV INFUSION ADD-ON: CPT

## 2021-05-27 PROCEDURE — 96360 HYDRATION IV INFUSION INIT: CPT

## 2021-05-27 RX ORDER — SODIUM CHLORIDE, SODIUM LACTATE, POTASSIUM CHLORIDE, CALCIUM CHLORIDE 600; 310; 30; 20 MG/100ML; MG/100ML; MG/100ML; MG/100ML
1000 INJECTION, SOLUTION INTRAVENOUS ONCE
Status: CANCELLED
Start: 2021-06-04

## 2021-05-27 RX ORDER — SODIUM CHLORIDE, SODIUM LACTATE, POTASSIUM CHLORIDE, CALCIUM CHLORIDE 600; 310; 30; 20 MG/100ML; MG/100ML; MG/100ML; MG/100ML
1000 INJECTION, SOLUTION INTRAVENOUS ONCE
Status: COMPLETED | OUTPATIENT
Start: 2021-05-27 | End: 2021-05-27

## 2021-05-27 RX ADMIN — SODIUM CHLORIDE, SODIUM LACTATE, POTASSIUM CHLORIDE, AND CALCIUM CHLORIDE 1000 ML: .6; .31; .03; .02 INJECTION, SOLUTION INTRAVENOUS at 11:41

## 2021-05-27 NOTE — PLAN OF CARE
Problem: Potential for Falls  Goal: Patient will remain free of falls  Description: INTERVENTIONS:  - Assess patient frequently for physical needs  -  Identify cognitive and physical deficits and behaviors that affect risk of falls    -  [place] fall precautions as indicated by assessment   - Educate patient/family on patient safety including physical limitations  - Instruct patient to call for assistance with activity based on assessment  - Modify environment to reduce risk of injury  - Consider OT/PT consult to assist with strengthening/mobility  Outcome: Progressing

## 2021-06-01 LAB — SCAN RESULT: NORMAL

## 2021-06-02 LAB — SCAN RESULT: NORMAL

## 2021-06-04 ENCOUNTER — HOSPITAL ENCOUNTER (OUTPATIENT)
Dept: INFUSION CENTER | Facility: CLINIC | Age: 46
End: 2021-06-04

## 2021-06-08 ENCOUNTER — TRANSCRIBE ORDERS (OUTPATIENT)
Dept: ADMINISTRATIVE | Facility: HOSPITAL | Age: 46
End: 2021-06-08

## 2021-06-08 ENCOUNTER — LAB (OUTPATIENT)
Dept: LAB | Facility: HOSPITAL | Age: 46
End: 2021-06-08
Payer: COMMERCIAL

## 2021-06-08 DIAGNOSIS — M79.7 SCAPULOHUMERAL FIBROSITIS: Primary | ICD-10-CM

## 2021-06-08 DIAGNOSIS — E03.2 HYPOTHYROIDISM DUE TO DRUGS: Primary | ICD-10-CM

## 2021-06-08 DIAGNOSIS — R73.9 HYPERGLYCEMIA: ICD-10-CM

## 2021-06-08 DIAGNOSIS — E05.90 THYROTOXICOSIS WITH OR WITHOUT GOITER: ICD-10-CM

## 2021-06-08 DIAGNOSIS — M79.7 SCAPULOHUMERAL FIBROSITIS: ICD-10-CM

## 2021-06-08 DIAGNOSIS — E03.2 HYPOTHYROIDISM DUE TO DRUGS: ICD-10-CM

## 2021-06-08 LAB
ALBUMIN SERPL BCP-MCNC: 4 G/DL (ref 3.5–5)
ALP SERPL-CCNC: 56 U/L (ref 46–116)
ALT SERPL W P-5'-P-CCNC: 25 U/L (ref 12–78)
ANION GAP SERPL CALCULATED.3IONS-SCNC: 9 MMOL/L (ref 4–13)
AST SERPL W P-5'-P-CCNC: 16 U/L (ref 5–45)
BACTERIA UR QL AUTO: ABNORMAL /HPF
BASOPHILS # BLD AUTO: 0.11 THOUSANDS/ΜL (ref 0–0.1)
BASOPHILS NFR BLD AUTO: 2 % (ref 0–1)
BILIRUB SERPL-MCNC: 0.31 MG/DL (ref 0.2–1)
BILIRUB UR QL STRIP: NEGATIVE
BUN SERPL-MCNC: 11 MG/DL (ref 5–25)
CALCIUM SERPL-MCNC: 9.1 MG/DL (ref 8.3–10.1)
CHLORIDE SERPL-SCNC: 104 MMOL/L (ref 100–108)
CLARITY UR: CLEAR
CO2 SERPL-SCNC: 26 MMOL/L (ref 21–32)
COLOR UR: YELLOW
CREAT SERPL-MCNC: 0.75 MG/DL (ref 0.6–1.3)
EOSINOPHIL # BLD AUTO: 0.11 THOUSAND/ΜL (ref 0–0.61)
EOSINOPHIL NFR BLD AUTO: 2 % (ref 0–6)
ERYTHROCYTE [DISTWIDTH] IN BLOOD BY AUTOMATED COUNT: 12.9 % (ref 11.6–15.1)
GFR SERPL CREATININE-BSD FRML MDRD: 95 ML/MIN/1.73SQ M
GLUCOSE SERPL-MCNC: 102 MG/DL (ref 65–140)
GLUCOSE UR STRIP-MCNC: NEGATIVE MG/DL
HCT VFR BLD AUTO: 43.1 % (ref 34.8–46.1)
HGB BLD-MCNC: 13.5 G/DL (ref 11.5–15.4)
HGB UR QL STRIP.AUTO: NEGATIVE
IMM GRANULOCYTES # BLD AUTO: 0.02 THOUSAND/UL (ref 0–0.2)
IMM GRANULOCYTES NFR BLD AUTO: 0 % (ref 0–2)
KETONES UR STRIP-MCNC: NEGATIVE MG/DL
LEUKOCYTE ESTERASE UR QL STRIP: NEGATIVE
LYMPHOCYTES # BLD AUTO: 2.09 THOUSANDS/ΜL (ref 0.6–4.47)
LYMPHOCYTES NFR BLD AUTO: 32 % (ref 14–44)
MCH RBC QN AUTO: 29.5 PG (ref 26.8–34.3)
MCHC RBC AUTO-ENTMCNC: 31.3 G/DL (ref 31.4–37.4)
MCV RBC AUTO: 94 FL (ref 82–98)
MONOCYTES # BLD AUTO: 0.56 THOUSAND/ΜL (ref 0.17–1.22)
MONOCYTES NFR BLD AUTO: 9 % (ref 4–12)
NEUTROPHILS # BLD AUTO: 3.58 THOUSANDS/ΜL (ref 1.85–7.62)
NEUTS SEG NFR BLD AUTO: 55 % (ref 43–75)
NITRITE UR QL STRIP: NEGATIVE
NON-SQ EPI CELLS URNS QL MICRO: ABNORMAL /HPF
NRBC BLD AUTO-RTO: 0 /100 WBCS
PH UR STRIP.AUTO: 6 [PH]
PLATELET # BLD AUTO: 409 THOUSANDS/UL (ref 149–390)
PMV BLD AUTO: 8.9 FL (ref 8.9–12.7)
POTASSIUM SERPL-SCNC: 3.9 MMOL/L (ref 3.5–5.3)
PROT SERPL-MCNC: 8 G/DL (ref 6.4–8.2)
PROT UR STRIP-MCNC: NEGATIVE MG/DL
RBC # BLD AUTO: 4.58 MILLION/UL (ref 3.81–5.12)
RBC #/AREA URNS AUTO: ABNORMAL /HPF
SODIUM SERPL-SCNC: 139 MMOL/L (ref 136–145)
SP GR UR STRIP.AUTO: <=1.005 (ref 1–1.03)
UROBILINOGEN UR QL STRIP.AUTO: 0.2 E.U./DL
WBC # BLD AUTO: 6.47 THOUSAND/UL (ref 4.31–10.16)
WBC #/AREA URNS AUTO: ABNORMAL /HPF

## 2021-06-08 PROCEDURE — 36415 COLL VENOUS BLD VENIPUNCTURE: CPT

## 2021-06-08 PROCEDURE — 86162 COMPLEMENT TOTAL (CH50): CPT

## 2021-06-08 PROCEDURE — 86200 CCP ANTIBODY: CPT

## 2021-06-08 PROCEDURE — 85025 COMPLETE CBC W/AUTO DIFF WBC: CPT

## 2021-06-08 PROCEDURE — 80053 COMPREHEN METABOLIC PANEL: CPT

## 2021-06-08 PROCEDURE — 86038 ANTINUCLEAR ANTIBODIES: CPT

## 2021-06-08 PROCEDURE — 86800 THYROGLOBULIN ANTIBODY: CPT

## 2021-06-08 PROCEDURE — 82306 VITAMIN D 25 HYDROXY: CPT

## 2021-06-08 PROCEDURE — 86160 COMPLEMENT ANTIGEN: CPT

## 2021-06-08 PROCEDURE — 86225 DNA ANTIBODY NATIVE: CPT

## 2021-06-08 PROCEDURE — 82607 VITAMIN B-12: CPT

## 2021-06-08 PROCEDURE — 86235 NUCLEAR ANTIGEN ANTIBODY: CPT

## 2021-06-08 PROCEDURE — 81001 URINALYSIS AUTO W/SCOPE: CPT | Performed by: INTERNAL MEDICINE

## 2021-06-08 PROCEDURE — 86430 RHEUMATOID FACTOR TEST QUAL: CPT

## 2021-06-08 PROCEDURE — 84597 ASSAY OF VITAMIN K: CPT

## 2021-06-09 LAB
25(OH)D3 SERPL-MCNC: 69.5 NG/ML (ref 30–100)
C3 SERPL-MCNC: 111 MG/DL (ref 90–180)
C4 SERPL-MCNC: 21 MG/DL (ref 10–40)
CCP AB SER IA-ACNC: 0.9
RHEUMATOID FACT SER QL LA: NEGATIVE
RYE IGE QN: NEGATIVE
VIT B12 SERPL-MCNC: 709 PG/ML (ref 100–900)

## 2021-06-10 ENCOUNTER — HOSPITAL ENCOUNTER (OUTPATIENT)
Dept: INFUSION CENTER | Facility: CLINIC | Age: 46
Discharge: HOME | End: 2021-06-10
Payer: COMMERCIAL

## 2021-06-10 VITALS
DIASTOLIC BLOOD PRESSURE: 85 MMHG | HEART RATE: 90 BPM | TEMPERATURE: 98.3 F | RESPIRATION RATE: 18 BRPM | SYSTOLIC BLOOD PRESSURE: 126 MMHG

## 2021-06-10 DIAGNOSIS — E44.1 MILD PROTEIN-CALORIE MALNUTRITION (HCC): ICD-10-CM

## 2021-06-10 DIAGNOSIS — R63.0 ANOREXIA: Primary | ICD-10-CM

## 2021-06-10 DIAGNOSIS — R19.7 DIARRHEA, UNSPECIFIED TYPE: ICD-10-CM

## 2021-06-10 LAB
CENTROMERE B AB SER-ACNC: <0.2 AI (ref 0–0.9)
CH50 SERPL-ACNC: >60 U/ML
DSDNA AB SER-ACNC: 1 IU/ML (ref 0–9)
ENA SS-A AB SER-ACNC: <0.2 AI (ref 0–0.9)
ENA SS-B AB SER-ACNC: <0.2 AI (ref 0–0.9)
THYROGLOB AB SERPL-ACNC: 1.4 IU/ML (ref 0–0.9)

## 2021-06-10 PROCEDURE — 96361 HYDRATE IV INFUSION ADD-ON: CPT

## 2021-06-10 PROCEDURE — 96360 HYDRATION IV INFUSION INIT: CPT

## 2021-06-10 RX ORDER — SODIUM CHLORIDE, SODIUM LACTATE, POTASSIUM CHLORIDE, CALCIUM CHLORIDE 600; 310; 30; 20 MG/100ML; MG/100ML; MG/100ML; MG/100ML
1000 INJECTION, SOLUTION INTRAVENOUS ONCE
Status: CANCELLED
Start: 2021-06-17

## 2021-06-10 RX ORDER — SODIUM CHLORIDE, SODIUM LACTATE, POTASSIUM CHLORIDE, CALCIUM CHLORIDE 600; 310; 30; 20 MG/100ML; MG/100ML; MG/100ML; MG/100ML
1000 INJECTION, SOLUTION INTRAVENOUS ONCE
Status: COMPLETED | OUTPATIENT
Start: 2021-06-10 | End: 2021-06-10

## 2021-06-10 RX ADMIN — SODIUM CHLORIDE, SODIUM LACTATE, POTASSIUM CHLORIDE, AND CALCIUM CHLORIDE 1000 ML: .6; .31; .03; .02 INJECTION, SOLUTION INTRAVENOUS at 12:05

## 2021-06-10 NOTE — PLAN OF CARE
Problem: Potential for Falls  Goal: Patient will remain free of falls  Description: INTERVENTIONS:  - Assess patient frequently for physical needs  -  Identify cognitive and physical deficits and behaviors that affect risk of falls    -  [place] fall precautions as indicated by assessment   - Educate patient/family on patient safety including physical limitations  - Instruct patient to call for assistance with activity based on assessment  - Modify environment to reduce risk of injury  - Consider OT/PT consult to assist with strengthening/mobility  Outcome: Progressing

## 2021-06-10 NOTE — PROGRESS NOTES
Pt tolerated hydration without incident  Pt declined AVS but did make future appts before leaving infusion center

## 2021-06-11 ENCOUNTER — APPOINTMENT (EMERGENCY)
Dept: NON INVASIVE DIAGNOSTICS | Facility: HOSPITAL | Age: 46
End: 2021-06-11
Payer: COMMERCIAL

## 2021-06-11 ENCOUNTER — TELEPHONE (OUTPATIENT)
Dept: GASTROENTEROLOGY | Facility: MEDICAL CENTER | Age: 46
End: 2021-06-11

## 2021-06-11 ENCOUNTER — HOSPITAL ENCOUNTER (EMERGENCY)
Facility: HOSPITAL | Age: 46
Discharge: HOME | End: 2021-06-11
Attending: EMERGENCY MEDICINE | Admitting: EMERGENCY MEDICINE
Payer: COMMERCIAL

## 2021-06-11 ENCOUNTER — APPOINTMENT (EMERGENCY)
Dept: CT IMAGING | Facility: HOSPITAL | Age: 46
End: 2021-06-11
Payer: COMMERCIAL

## 2021-06-11 ENCOUNTER — APPOINTMENT (EMERGENCY)
Dept: RADIOLOGY | Facility: HOSPITAL | Age: 46
End: 2021-06-11
Payer: COMMERCIAL

## 2021-06-11 VITALS
OXYGEN SATURATION: 97 % | SYSTOLIC BLOOD PRESSURE: 103 MMHG | HEART RATE: 80 BPM | WEIGHT: 162.7 LBS | HEIGHT: 64 IN | DIASTOLIC BLOOD PRESSURE: 68 MMHG | RESPIRATION RATE: 19 BRPM | BODY MASS INDEX: 27.78 KG/M2 | TEMPERATURE: 98.2 F

## 2021-06-11 DIAGNOSIS — R10.9 ABDOMINAL PAIN: Primary | ICD-10-CM

## 2021-06-11 DIAGNOSIS — R51.9 HEADACHE: ICD-10-CM

## 2021-06-11 DIAGNOSIS — K80.50 CHOLEDOCHOLITHIASIS: ICD-10-CM

## 2021-06-11 DIAGNOSIS — T14.8XXA BRUISE: ICD-10-CM

## 2021-06-11 DIAGNOSIS — R10.11 RIGHT UPPER QUADRANT ABDOMINAL PAIN: Primary | ICD-10-CM

## 2021-06-11 LAB
ALBUMIN SERPL BCP-MCNC: 3.8 G/DL (ref 3.5–5)
ALP SERPL-CCNC: 47 U/L (ref 46–116)
ALT SERPL W P-5'-P-CCNC: 24 U/L (ref 12–78)
ANION GAP SERPL CALCULATED.3IONS-SCNC: 12 MMOL/L (ref 4–13)
APTT PPP: 24 SECONDS (ref 23–37)
AST SERPL W P-5'-P-CCNC: 18 U/L (ref 5–45)
BACTERIA UR QL AUTO: NORMAL /HPF
BASE EX.OXY STD BLDV CALC-SCNC: 83.9 % (ref 60–80)
BASE EXCESS BLDV CALC-SCNC: 1.7 MMOL/L
BASOPHILS # BLD AUTO: 0.13 THOUSANDS/ΜL (ref 0–0.1)
BASOPHILS NFR BLD AUTO: 2 % (ref 0–1)
BILIRUB SERPL-MCNC: 0.37 MG/DL (ref 0.2–1)
BILIRUB UR QL STRIP: NEGATIVE
BUN SERPL-MCNC: 13 MG/DL (ref 5–25)
CALCIUM SERPL-MCNC: 8.5 MG/DL (ref 8.3–10.1)
CHLORIDE SERPL-SCNC: 105 MMOL/L (ref 100–108)
CLARITY UR: CLEAR
CO2 SERPL-SCNC: 25 MMOL/L (ref 21–32)
COLOR UR: YELLOW
CREAT SERPL-MCNC: 0.69 MG/DL (ref 0.6–1.3)
ENA SCL70 AB SER-ACNC: <0.2 AI (ref 0–0.9)
EOSINOPHIL # BLD AUTO: 0.1 THOUSAND/ΜL (ref 0–0.61)
EOSINOPHIL NFR BLD AUTO: 2 % (ref 0–6)
ERYTHROCYTE [DISTWIDTH] IN BLOOD BY AUTOMATED COUNT: 13 % (ref 11.6–15.1)
GFR SERPL CREATININE-BSD FRML MDRD: 103 ML/MIN/1.73SQ M
GLUCOSE SERPL-MCNC: 93 MG/DL (ref 65–140)
GLUCOSE UR STRIP-MCNC: NEGATIVE MG/DL
HCO3 BLDV-SCNC: 26.6 MMOL/L (ref 24–30)
HCT VFR BLD AUTO: 38.3 % (ref 34.8–46.1)
HGB BLD-MCNC: 12.7 G/DL (ref 11.5–15.4)
HGB UR QL STRIP.AUTO: ABNORMAL
HISTONE IGG SER IA-ACNC: 1.2 UNITS (ref 0–0.9)
IMM GRANULOCYTES # BLD AUTO: 0.02 THOUSAND/UL (ref 0–0.2)
IMM GRANULOCYTES NFR BLD AUTO: 0 % (ref 0–2)
INR PPP: 0.95 (ref 0.84–1.19)
KETONES UR STRIP-MCNC: NEGATIVE MG/DL
LACTATE SERPL-SCNC: 1.2 MMOL/L (ref 0.5–2)
LEUKOCYTE ESTERASE UR QL STRIP: NEGATIVE
LIPASE SERPL-CCNC: 95 U/L (ref 73–393)
LYMPHOCYTES # BLD AUTO: 2.84 THOUSANDS/ΜL (ref 0.6–4.47)
LYMPHOCYTES NFR BLD AUTO: 43 % (ref 14–44)
MCH RBC QN AUTO: 30.5 PG (ref 26.8–34.3)
MCHC RBC AUTO-ENTMCNC: 33.2 G/DL (ref 31.4–37.4)
MCV RBC AUTO: 92 FL (ref 82–98)
MONOCYTES # BLD AUTO: 0.69 THOUSAND/ΜL (ref 0.17–1.22)
MONOCYTES NFR BLD AUTO: 11 % (ref 4–12)
NEUTROPHILS # BLD AUTO: 2.75 THOUSANDS/ΜL (ref 1.85–7.62)
NEUTS SEG NFR BLD AUTO: 42 % (ref 43–75)
NITRITE UR QL STRIP: NEGATIVE
NON-SQ EPI CELLS URNS QL MICRO: NORMAL /HPF
NRBC BLD AUTO-RTO: 0 /100 WBCS
O2 CT BLDV-SCNC: 16 ML/DL
PCO2 BLDV: 42.9 MM HG (ref 42–50)
PH BLDV: 7.41 [PH] (ref 7.3–7.4)
PH UR STRIP.AUTO: 7 [PH]
PLATELET # BLD AUTO: 415 THOUSANDS/UL (ref 149–390)
PMV BLD AUTO: 8.9 FL (ref 8.9–12.7)
PO2 BLDV: 46.2 MM HG (ref 35–45)
POTASSIUM SERPL-SCNC: 3.4 MMOL/L (ref 3.5–5.3)
PROCALCITONIN SERPL-MCNC: <0.05 NG/ML
PROT SERPL-MCNC: 7.4 G/DL (ref 6.4–8.2)
PROT UR STRIP-MCNC: NEGATIVE MG/DL
PROTHROMBIN TIME: 12.5 SECONDS (ref 11.6–14.5)
RBC # BLD AUTO: 4.17 MILLION/UL (ref 3.81–5.12)
RBC #/AREA URNS AUTO: NORMAL /HPF
SODIUM SERPL-SCNC: 142 MMOL/L (ref 136–145)
SP GR UR STRIP.AUTO: <=1.005 (ref 1–1.03)
TROPONIN I SERPL-MCNC: <0.02 NG/ML
UROBILINOGEN UR QL STRIP.AUTO: 0.2 E.U./DL
WBC # BLD AUTO: 6.53 THOUSAND/UL (ref 4.31–10.16)
WBC #/AREA URNS AUTO: NORMAL /HPF

## 2021-06-11 PROCEDURE — 81001 URINALYSIS AUTO W/SCOPE: CPT | Performed by: EMERGENCY MEDICINE

## 2021-06-11 PROCEDURE — 93005 ELECTROCARDIOGRAM TRACING: CPT

## 2021-06-11 PROCEDURE — 84145 PROCALCITONIN (PCT): CPT | Performed by: EMERGENCY MEDICINE

## 2021-06-11 PROCEDURE — 82805 BLOOD GASES W/O2 SATURATION: CPT | Performed by: EMERGENCY MEDICINE

## 2021-06-11 PROCEDURE — 99285 EMERGENCY DEPT VISIT HI MDM: CPT | Performed by: EMERGENCY MEDICINE

## 2021-06-11 PROCEDURE — 71045 X-RAY EXAM CHEST 1 VIEW: CPT

## 2021-06-11 PROCEDURE — G1004 CDSM NDSC: HCPCS

## 2021-06-11 PROCEDURE — 99284 EMERGENCY DEPT VISIT MOD MDM: CPT

## 2021-06-11 PROCEDURE — 36415 COLL VENOUS BLD VENIPUNCTURE: CPT | Performed by: EMERGENCY MEDICINE

## 2021-06-11 PROCEDURE — 96374 THER/PROPH/DIAG INJ IV PUSH: CPT

## 2021-06-11 PROCEDURE — 93971 EXTREMITY STUDY: CPT

## 2021-06-11 PROCEDURE — 83690 ASSAY OF LIPASE: CPT | Performed by: EMERGENCY MEDICINE

## 2021-06-11 PROCEDURE — 93971 EXTREMITY STUDY: CPT | Performed by: SURGERY

## 2021-06-11 PROCEDURE — 83605 ASSAY OF LACTIC ACID: CPT | Performed by: EMERGENCY MEDICINE

## 2021-06-11 PROCEDURE — 70450 CT HEAD/BRAIN W/O DYE: CPT

## 2021-06-11 PROCEDURE — 80053 COMPREHEN METABOLIC PANEL: CPT | Performed by: EMERGENCY MEDICINE

## 2021-06-11 PROCEDURE — 96375 TX/PRO/DX INJ NEW DRUG ADDON: CPT

## 2021-06-11 PROCEDURE — 84484 ASSAY OF TROPONIN QUANT: CPT | Performed by: EMERGENCY MEDICINE

## 2021-06-11 PROCEDURE — 85025 COMPLETE CBC W/AUTO DIFF WBC: CPT | Performed by: EMERGENCY MEDICINE

## 2021-06-11 PROCEDURE — 74176 CT ABD & PELVIS W/O CONTRAST: CPT

## 2021-06-11 PROCEDURE — 85730 THROMBOPLASTIN TIME PARTIAL: CPT | Performed by: EMERGENCY MEDICINE

## 2021-06-11 PROCEDURE — 87040 BLOOD CULTURE FOR BACTERIA: CPT | Performed by: EMERGENCY MEDICINE

## 2021-06-11 PROCEDURE — 85610 PROTHROMBIN TIME: CPT | Performed by: EMERGENCY MEDICINE

## 2021-06-11 RX ORDER — METHYLPREDNISOLONE SODIUM SUCCINATE 125 MG/2ML
125 INJECTION, POWDER, LYOPHILIZED, FOR SOLUTION INTRAMUSCULAR; INTRAVENOUS ONCE
Status: COMPLETED | OUTPATIENT
Start: 2021-06-11 | End: 2021-06-11

## 2021-06-11 RX ORDER — DIPHENHYDRAMINE HYDROCHLORIDE 50 MG/ML
25 INJECTION INTRAMUSCULAR; INTRAVENOUS ONCE
Status: COMPLETED | OUTPATIENT
Start: 2021-06-11 | End: 2021-06-11

## 2021-06-11 RX ORDER — METOCLOPRAMIDE HYDROCHLORIDE 5 MG/ML
10 INJECTION INTRAMUSCULAR; INTRAVENOUS ONCE
Status: COMPLETED | OUTPATIENT
Start: 2021-06-11 | End: 2021-06-11

## 2021-06-11 RX ADMIN — DIPHENHYDRAMINE HYDROCHLORIDE 25 MG: 50 INJECTION, SOLUTION INTRAMUSCULAR; INTRAVENOUS at 06:39

## 2021-06-11 RX ADMIN — METOCLOPRAMIDE HYDROCHLORIDE 10 MG: 5 INJECTION INTRAMUSCULAR; INTRAVENOUS at 07:32

## 2021-06-11 RX ADMIN — METHYLPREDNISOLONE SODIUM SUCCINATE 125 MG: 125 INJECTION, POWDER, FOR SOLUTION INTRAMUSCULAR; INTRAVENOUS at 06:39

## 2021-06-11 NOTE — ED NOTES
Housekeeping entered pt room to empty trash with patient approval  Patient stated to the , "you can't open the garbage bag in the room it will cause me to have a reaction"        Sandra Bustillos RN  06/11/21 1986

## 2021-06-11 NOTE — DISCHARGE INSTRUCTIONS
As we discussed, your labs were normal   The urinalysis was normal as well  CT of your head did not show any acute abnormalities  CT of the abdomen and pelvis showed stones within your bilateral kidneys which should not cause any symptoms unless they enter the ureter  There was a possible abnormality of your bladder seen on CT which the radiologist said could possibly represent cystitis or a bladder infection  However, your urinalysis was normal, so this likely represents an artifact, which is a finding on CT scan that is present on the images but not present in real life  Finally, the Radiologist saw an abnormality in the common hepatic duct  This could represent a focus of air or potentially a cholesterol stone  I spoke with Dr Betty Regan from GI who recommended that you get an MRCP as an outpatient  If this truly is a stone, you may need an ERCP  Speak with your GI doctor regarding this  Call the office today  The venous duplex was negative for DVT  Closely monitor your symptoms at home  Return to the ER with fever, shaking chills, stroke-like symptoms, chest pain for more than a few minutes, difficulty breathing, repeated vomiting, worsening or changing abdominal pain, or any other concerning symptoms

## 2021-06-11 NOTE — TELEPHONE ENCOUNTER
Doing well until last week when she had "exposures" and felt that her mast cell was active  Monday and Tuesday she has vision loss, decreased vision in right eye  Better by Wednesday  Yesterday morning she had a leg bruise; it was painful  During infusion for hydration she had bruising and difficulties with her veins  She had chills  She felt her body temp was low  CT scan:  1  Limited examination due to lack of intravenous and oral contrast material   2   Mild abnormal appearance of the urinary bladder  Although the findings may be due to under distention, mild cystitis not excluded  3   Nonobstructing bilateral renal calyceal calculi  4   Mild constipation  5   Small focus of air versus cholesterol calculus in the common hepatic duct  It was recommended she undergo an MRCP but it was not ordered yet

## 2021-06-11 NOTE — ED PROVIDER NOTES
EMERGENCY DEPARTMENT ENCOUNTER NOTE    This note has been generated using a voice recognition software  There may be typographic, grammatic, or word substitution errors that have escaped editorial review  ? CHIEF COMPLAINT  Chief Complaint   Patient presents with    Medical Problem     Pt states "she believes she is septic and she has mulitple blood clots in leg and arm  she also complains of being dehydrated, having a headache, blurred vision "       JORGE Sarmiento is a 48 y o  female with extensive past medical history including inflammatory bowel disease, rheumatoid arthritis, lupus, MS, mast activation syndrome, deep vein thrombosis, superficial thrombophlebitis, presenting with several complaints  Patient reports feeling "off" this morning, reports developing right upper quadrant abdominal pain and having large volume bowel movements that are formed, and without blood or mucus  No vomiting  Patient has had morning headaches which is usual for her, however, these usually resolve during the day  Patient developed a headache on 06/10 which did not resolve  It is squeezing, affecting frontal and occipital regions  There is some associated visual blurring  No focal weakness or numbness  Patient has had foul-smelling urine  She also notes bruising to her right upper leg just below the knee as well as to left forearm  She does not recall any injury to these areas          REVIEW OF SYSTEMS    Constitutional: denies fevers, has felt chilled  Visual/Eyes:  Reports visual blurring  HENT: no rhinorrhea, no sore throat  Cardiac: no chest pain  Respiratory: no shortness of breath, no cough  GI:  Right upper quadrant abdominal pain, change in bowel movements  :  Reports foul-smelling urine  Heme/Onc:  History of thrombocytosis, history of DVT, patient is not on anticoagulation  Rheum:  History of lupus, history of mast activation syndrome  Endocrine: no diabetes  Neuro: no focal weakness or numbness, headaches as above    Ten systems reviewed and negative unless otherwise noted in HPI and above    PAST MEDICAL HISTORY  Past Medical History:   Diagnosis Date    Anxiety     Asthma     Chronic kidney disease     Deep vein thrombosis (HCC)     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)     NSTEMI  pt denies, documented in cardio note    Pancreatitis     sphinger of     Psychiatric disorder     [facility]umatoid [facility]) ([facility] [address] [facility]     Ulcerative colitis ([facility] [address] )        SURGICAL HISTORY  Past Surgical History:   Procedure Laterality Date    APPENDECTOMY      CHOLECYSTECTOMY      EGD AND COLONOSCOPY N/A 9/12/2017    Procedure: EGD AND COLONOSCOPY;  Surgeon: Susie Mccain MD;  [facility]; Service: Gastroenterology    ERCP N/A 9/15/2017    Procedure: ENDOSCOPIC RETROGRADE CHOLANGIOPANCREATOGRAPHY (ERCP); Surgeon: Nickie Gutierrez MD;  [facility];   Service: Gastroenterology    HYSTERECTOMY      KNEE SURGERY Right     LAPAROSCOPIC ENDOMETRIOSIS FULGURATION      ORIF ANKLE FRACTURE Left     CT KNEE SCOPE,MED/LAT MENISECTOMY Left 5/12/2017    Procedure: POSSIBLE MEDIAL MENISECTOMY;  Surgeon: Sandeep Epps MD;  [facility];  Service: Orthopedics    CT KNEE [address] CART Left 5/12/2017    Procedure: KNEE ARTHROSCOPY EVALUATION, CHONDROPLASTY;  Surgeon: Sandeep Epps MD;  [facility];  Service: Orthopedics    CT LAP,DIAGNOSTIC ABDOMEN N/A 12/12/2017    Procedure: LAPAROSCOPY DIAGNOSTIC  WITH LYSIS OF ADHESIONS;  Surgeon: Darrell Morgan DO;  Location:  MAIN OR;  Service: General       FAMILY HISTORY  Family History   Problem Relation Age of Onset    Ulcerative colitis Mother     Heart failure Father     Neuropathy Father     Macular degeneration Father     Diabetes Father     Asthma Daughter     Hypothyroidism Daughter     Heart disease Maternal Grandmother     Arthritis Maternal Grandmother     Arthritis Paternal Grandmother     Macular degeneration Paternal Grandmother     Lymphoma Paternal Grandfather     Heart failure Paternal Aunt     Heart failure Paternal [de-identified]     Breast cancer Paternal Aunt 47    Breast cancer additional onset Paternal Aunt 62    Hypothyroidism Paternal Aunt     Breast cancer Maternal Aunt     No Known Problems Maternal Grandfather         CURRENT MEDICATIONS  Current Facility-Administered Medications on File Prior to Encounter   Medication    cyanocobalamin injection 1,000 mcg    [COMPLETED] lactated ringers infusion 1,000 mL     Current Outpatient Medications on File Prior to Encounter   Medication Sig    acetaminophen (TYLENOL) 500 mg tablet Take 1,000 mg by mouth every 6 (six) hours as needed for mild pain    budesonide (ENTOCORT EC) 3 MG capsule Take 1 capsule (3 mg total) by mouth 3 (three) times a day Before meals    busPIRone (BUSPAR) 5 mg tablet TAKE ONE TABLET BY MOUTH EVERY 24 HOURS    butalbital-acetaminophen-caffeine (FIORICET,ESGIC) -40 mg per tablet TAKE ONE TABLET BY MOUTH EVERY 8 HOURS AS NEEDED FOR HEADACHES    calcium citrate (CALCITRATE) 950 (200 Ca) MG tablet Take 1 tablet (950 mg total) by mouth daily    cetirizine (ZyrTEC) 10 mg tablet Take 20 mg by mouth daily     cholecalciferol (VITAMIN D) 400 units/mL Take 2 5 mL (1,000 Units total) by mouth daily (Patient taking differently: Take 1,200 Units by mouth daily )    cromolyn (GASTROCROM) 100 MG/5ML solution   Start: 11/16/20 8:54:00 EST, only when in Green zone 2 vials bid    cyanocobalamin 1,000 mcg/mL     diclofenac (VOLTAREN) 75 mg EC tablet Take 1 tablet (75 mg total) by mouth 2 (two) times a day for 21 days (Patient taking differently: Take 75 mg by mouth as needed )    diphenhydrAMINE (BENADRYL) 25 mg tablet Take 50 mg by mouth 2 (two) times a day     EPINEPHrine (EPIPEN 2-MARYSOL IJ) EpiPen   prn    ergocalciferol (VITAMIN D2) 50,000 units     ergocalciferol (VITAMIN D2) 50,000 units   Start: 02/22/21 17:37:00 EST, See Instructions, 50,000 Int_Unit PO 1 time per week    hydrOXYzine HCL (ATARAX) 25 mg tablet Take 25 mg by mouth 4 (four) times a day     levalbuterol (XOPENEX HFA) 45 mcg/act inhaler Inhale 1-2 puffs every 4 (four) hours as needed for shortness of breath    levothyroxine 50 mcg tablet Take 50 mcg by mouth daily    liothyronine (CYTOMEL) 5 mcg tablet Take 5 mcg by mouth daily    LORazepam (ATIVAN) 1 mg tablet Take 1 tablet (1 mg total) by mouth every 8 (eight) hours as needed for anxiety    montelukast (SINGULAIR) 10 mg tablet Take 1 tablet (10 mg total) by mouth daily at bedtime    NON FORMULARY immunoglobin sq 6g 30 ml once a week    omalizumab (XOLAIR) 150 mg Inject 300 mg under the skin every 28 days     pantoprazole (PROTONIX) 40 mg tablet Take 1 tablet (40 mg total) by mouth daily    polyethylene glycol (GLYCOLAX) 17 GM/SCOOP powder Take 17 g by mouth daily as needed (constipation)    predniSONE 20 mg tablet Take 2 tablets (40 mg total) by mouth daily (Patient taking differently: Take 5 mg by mouth once a week )    Riboflavin (Vitamin B-2) 100 MG TABS Take 4 tablets (400 mg total) by mouth daily    Stelara 90 MG/ML subcutaneous injection     temazepam (RESTORIL) 30 mg capsule Take 1 capsule (30 mg total) by mouth daily at bedtime as needed for sleep    ustekinumab (Stelara) 90 mg/mL subcutaneous injection Inject 1 mL (90 mg total) under the skin every 28 days (Patient taking differently: Inject 90 mg under the skin every 56 days )       ALLERGIES  Allergies   Allergen Reactions    Amitriptyline Anaphylaxis     According to the patient she had nphylaxis    Aspergillus Fumigatus Other (See Comments), Hives and Swelling    Azathioprine Other (See Comments) and Anaphylaxis     pancreatitis  According to patient she needed Epipen    Eggs Or Egg-Derived Products - Food Allergy Anaphylaxis    Sumatriptan Anaphylaxis     Bradycardia, sob, back pressure, head pain,throat tightness  According to the patient she had anphylaxis    Contrast [Iodinated Diagnostic Agents]      Pt states needs to be premedicated prior to injection    Corn Dextrin      Any corn based products    Dragoon Oil - Food Allergy - Food Allergy Hives    Gelatin - Food Allergy     Histamine      Intolerance      Humira [Adalimumab] Other (See Comments)     "I develop drug induced lupus"    Ibuprofen Hives and Throat Swelling    Lidocaine     Malto Dextrin [Dextrin]     Other      Gel caps, maltodextrose, dextrose,grapes    Penicillium Notatum     Remicade [Infliximab] Other (See Comments)     "I develop drug induced lupus"    Sulfa Antibiotics Hives and Other (See Comments)    Sulfate     Sulfites - Food Allergy        SOCIAL HISTORY  Social History     Socioeconomic History    Marital status: /Civil Union     Spouse name: None    Number of children: None    Years of education: None    Highest education level: None   Occupational History    None   Social Needs    Financial resource strain: None    Food insecurity     Worry: None     Inability: None    Transportation needs     Medical: None     Non-medical: None   Tobacco Use    Smoking status: Never Smoker    Smokeless tobacco: Never Used   Substance and Sexual Activity    Alcohol use: Never     Frequency: Never     Binge frequency: Never    Drug use: Not Currently    Sexual activity: Yes     Partners: Male   Lifestyle    Physical activity     Days per week: None     Minutes per session: None    Stress: None   Relationships    Social connections     Talks on phone: None     Gets together: None     Attends [employer] service: None     Active member of club or organization: None     Attends meetings of clubs or organizations: None     Relationship status: None    Intimate partner violence     Fear of current or ex partner: None     Emotionally abused: None Physically abused: None     Forced sexual activity: None   Other Topics Concern    None   Social History Narrative    None       PHYSICAL EXAM    /82 (BP Location: Right arm)   Pulse 85   Temp 98 2 °F (36 8 °C) (Temporal)   Resp 18   Ht 5' 4" (1 626 m)   Wt 73 8 kg (162 lb 11 2 oz)   SpO2 100%   BMI 27 93 kg/m²   Vital signs and nursing notes reviewed    CONSTITUTIONAL: female appearing stated age resting in bed, in no acute distress  HEENT: atraumatic, normocephalic  Sclera anicteric, conjunctiva are not injected  Moist oral mucosa  CARDIOVASCULAR/CHEST: RRR, no M/R/G  2+ radial pulses  PULMONARY: Breathing comfortably on RA  Breath sounds are equal and clear to auscultation  ABDOMEN: non-distended  BS present, normoactive  Tender to palpation in right upper quadrant without rebound or guarding  MSK: moves all extremities, no deformities, there is no left calf pain, edema, or tenderness, right calf is with ecchymosis to right proximal medial leg just below the knee  Calf is perhaps slightly more generous compared to left  2+ dorsalis pedis and posterior tibial pulses  NEURO: Awake, alert, and oriented x 3  Pupils 4 mm and reactive  Face symmetric  Moves all extremities spontaneously  No focal neurologic deficits  SKIN: Warm, appears well-perfused  MENTAL STATUS: Normal affect      ?   LABS AND TESTS    Results Reviewed     Procedure Component Value Units Date/Time    Comprehensive metabolic panel [[id number]]  (Abnormal) Collected: 06/11/21 0541    Lab Status: Final result Specimen: Blood from Arm, Left Updated: 06/11/21 0615     Sodium 142 mmol/L      Potassium 3 4 mmol/L      Chloride 105 mmol/L      CO2 25 mmol/L      ANION GAP 12 mmol/L      BUN 13 mg/dL      Creatinine 0 69 mg/dL      Glucose 93 mg/dL      Calcium 8 5 mg/dL      AST 18 U/L      ALT 24 U/L      Alkaline Phosphatase 47 U/L      Total Protein 7 4 g/dL      Albumin 3 8 g/dL      Total Bilirubin 0 37 mg/dL      eGFR 103 ml/min/1 73sq m     Narrative:      National Kidney Disease Foundation guidelines for Chronic Kidney Disease (CKD):     Stage 1 with normal or high GFR (GFR > 90 mL/min/1 73 square meters)    Stage 2 Mild CKD (GFR = 60-89 mL/min/1 73 square meters)    Stage 3A Moderate CKD (GFR = 45-59 mL/min/1 73 square meters)    Stage 3B Moderate CKD (GFR = 30-44 mL/min/1 73 square meters)    Stage 4 Severe CKD (GFR = 15-29 mL/min/1 73 square meters)    Stage 5 End Stage CKD (GFR <15 mL/min/1 73 square meters)  Note: GFR calculation is accurate only with a steady state creatinine    Lactic acid [[id number]]  (Normal) Collected: 06/11/21 0541    Lab Status: Final result Specimen: Blood from Arm, Left Updated: 06/11/21 0609     LACTIC ACID 1 2 mmol/L     Narrative:      Result may be elevated if tourniquet was used during collection      Troponin I [[id number]]  (Normal) Collected: 06/11/21 0541    Lab Status: Final result Specimen: Blood from Arm, Left Updated: 06/11/21 0609     Troponin I <0 02 ng/mL     Protime-INR [[id number]]  (Normal) Collected: 06/11/21 0541    Lab Status: Final result Specimen: Blood from Arm, Left Updated: 06/11/21 0600     Protime 12 5 seconds      INR 0 95    APTT [[id number]]  (Normal) Collected: 06/11/21 0541    Lab Status: Final result Specimen: Blood from Arm, Left Updated: 06/11/21 0600     PTT 24 seconds     CBC and differential [[id number]]  (Abnormal) Collected: 06/11/21 0541    Lab Status: Final result Specimen: Blood from Arm, Left Updated: 06/11/21 0554     WBC 6 53 Thousand/uL      RBC 4 17 Million/uL      Hemoglobin 12 7 g/dL      Hematocrit 38 3 %      MCV 92 fL      MCH 30 5 pg      MCHC 33 2 g/dL      RDW 13 0 %      MPV 8 9 fL      Platelets 361 Thousands/uL      nRBC 0 /100 WBCs      Neutrophils Relative 42 %      Immat GRANS % 0 %      Lymphocytes Relative 43 %      Monocytes Relative 11 %      Eosinophils Relative 2 %      Basophils Relative 2 %      Neutrophils Absolute 2 75 Thousands/µL      Immature Grans Absolute 0 02 Thousand/uL      Lymphocytes Absolute 2 84 Thousands/µL      Monocytes Absolute 0 69 Thousand/µL      Eosinophils Absolute 0 10 Thousand/µL      Basophils Absolute 0 13 Thousands/µL     Blood gas, Venous [[id number]]  (Abnormal) Collected: 06/11/21 0541    Lab Status: Final result Specimen: Blood from Arm, Left Updated: 06/11/21 0553     pH, Arnel 7 410     pCO2, Arnel 42 9 mm Hg      pO2, Arnel 46 2 mm Hg      HCO3, Arnel 26 6 mmol/L      Base Excess, Arnel 1 7 mmol/L      O2 Content, Arnel 16 0 ml/dL      O2 HGB, VENOUS 83 9 %     Procalcitonin with AM Reflex [[id number]] Collected: 06/11/21 0541    Lab Status: In process Specimen: Blood from Arm, Left Updated: 06/11/21 0549    Blood culture #1 [[id number]] Collected: 06/11/21 0541    Lab Status: In process Specimen: Blood from Arm, Right Updated: 06/11/21 0549    Blood culture #2 [[id number]] Collected: 06/11/21 0541    Lab Status: In process Specimen: Blood from Arm, Left Updated: 06/11/21 0549    UA w Reflex to Microscopic w Reflex to Culture [[id number]]     Lab Status: No result Specimen: Urine           XR chest portable    (Results Pending)   CT head without contrast    (Results Pending)   CT abdomen pelvis wo contrast    (Results Pending)   VAS lower limb venous duplex study, unilateral/limited    (Results Pending)       ED COURSE & MEDICAL DECISION MAKING  ECG 12 Lead Documentation Only    Date/Time: 6/11/2021 5:45 AM  Performed by: Jian Caputo MD  Authorized by: Jian Caputo MD     Comments:      Normal sinus rhythm, ventricular rate 74, MS interval 124, QRS 86, , normal axis, no ST/T-wave changes to suggest ischemia, no STEMI  Formal read notes septal infarct, however, these are actually S waves rather than Q-waves  No significant change from prior EKG dated 12/07/2020                   Medications   metoclopramide (REGLAN) injection 10 mg (has no administration in time range)   methylPREDNISolone sodium succinate (Solu-MEDROL) injection 125 mg (125 mg Intravenous Given 6/11/21 8302)   diphenhydrAMINE (BENADRYL) injection 25 mg (25 mg Intravenous Given 6/11/21 8810)     53-year-old female with complex past medical history presenting with headaches, right upper quadrant abdominal pain, as well as bruising to left forearm and right leg  Vital signs reviewed, afebrile and within normal limits  Patient is concerned that she may be developing sepsis as she has previously had similar symptoms that ultimately led up to patient having sepsis  I placed ultrasound-guided peripheral IV in patient's left basilic pain which patient tolerated well  Given the patient does receive IVIG and is on Stelara for IBD, she is immunosuppressed  Sepsis workup ordered as a precautionary measure  VBG is with normal pH of 7 41, CMP is with mild hypokalemia of 3 4, with intact renal and liver function otherwise, lactate is normal at 1 2, troponin x1 is within normal limits, CBC is without leukocytosis, with basophil predominance, with thrombocytosis of 415, coags are within normal limits  UA is without evidence of urinary tract infection  CT abdomen/pelvis ordered, patient reports that she requires premedication for the study  Discussed with the patient that we are planning to obtain a study without IV contrast, however, patient reports that per her allergy specialist protocols, she is to receive Solu-Medrol and Benadryl regardless of whether this that is with or without contrast   Patient provides documentation by Dr Tani Mendoza () regarding the mast activation syndrome protocols and there is a statement "premedication for radiology studies with or without dyes"  As a precautionary measure, I am administering these, however, I do not believe these medications are indicated  CT abd/pelvis wo contrast pursued, CT head pursued  Will obtain RLE vascular study to r/o DVT   Patient at present has no evidence of sepsis or of a localizing source of infection  Barring unexpected findings on imaging, anticipate patient will be able to be discharged to home  I discussed this with the [address] [place] [address] and [facility] on-call, there is no physiologic reason for the non-contrasted study to require premedication  MDM  Number of Diagnoses or Management Options  Abdominal pain: new and requires workup  Bruise: new and requires workup  Headache: new and requires workup     Amount and/or Complexity of Data Reviewed  Clinical lab tests: ordered and reviewed  Tests in the radiology section of CPT®: ordered and reviewed  Tests in the medicine section of CPT®: ordered and reviewed  Review and summarize past medical records: yes  Discuss the patient with other providers: yes ([facility] and Women's allergy fellow)  Independent visualization of images, tracings, or specimens: yes    Risk of Complications, Morbidity, and/or Mortality  Presenting problems: high  Diagnostic procedures: high  Management options: high        CLINICAL IMPRESSION  Final diagnoses:   Abdominal pain   Headache   Bruise - Right leg       DISPOSITION  Time reflects when diagnosis was documented in both MDM as applicable and the Disposition within this note     Time User Action Codes Description Comment    6/11/2021  7:17 AM Donnalee Leak Add [R10 9] Abdominal pain     6/11/2021  7:17 AM Donnalee Leak Add [R51 9] Headache     6/11/2021  7:18 AM Marcelene Fausto  8XXA] Bruise     6/11/2021  7:18 AM Donnalee Leak Modify [T14  8XXA] Bruise Right leg      ED Disposition     None      Follow-up Information    None            Sudhakar Kiran MD  06/11/21 1156

## 2021-06-11 NOTE — TELEPHONE ENCOUNTER
Renay Nguyen called wishing to speak with you regarding her recent ER visit  She had questions regarding scheduling follow up with you, or having an MRCP first, if you could call her please, I would be happy to find her an appointment if necessary, just let me know, thanks!

## 2021-06-11 NOTE — ED NOTES
Pt provided documentation to provider that she is to be pre-medicated for all CT studies with and without dye  Will proceed with ordered medications        Karely Reyes RN  06/11/21 1088

## 2021-06-14 ENCOUNTER — DOCTOR'S OFFICE (OUTPATIENT)
Dept: URBAN - NONMETROPOLITAN AREA CLINIC 1 | Facility: CLINIC | Age: 46
Setting detail: OPHTHALMOLOGY
End: 2021-06-14
Payer: COMMERCIAL

## 2021-06-14 VITALS — HEIGHT: 55 IN

## 2021-06-14 DIAGNOSIS — H43.813: ICD-10-CM

## 2021-06-14 DIAGNOSIS — H53.123: ICD-10-CM

## 2021-06-14 DIAGNOSIS — H40.003: ICD-10-CM

## 2021-06-14 DIAGNOSIS — H46.8: ICD-10-CM

## 2021-06-14 DIAGNOSIS — H25.13: ICD-10-CM

## 2021-06-14 LAB
ATRIAL RATE: 74 BPM
P AXIS: 36 DEGREES
PR INTERVAL: 124 MS
QRS AXIS: 79 DEGREES
QRSD INTERVAL: 86 MS
QT INTERVAL: 416 MS
QTC INTERVAL: 461 MS
T WAVE AXIS: 58 DEGREES
VENTRICULAR RATE: 74 BPM

## 2021-06-14 PROCEDURE — 93010 ELECTROCARDIOGRAM REPORT: CPT | Performed by: INTERNAL MEDICINE

## 2021-06-14 PROCEDURE — 92235 FLUORESCEIN ANGRPH MLTIFRAME: CPT | Performed by: OPHTHALMOLOGY

## 2021-06-14 PROCEDURE — 92014 COMPRE OPH EXAM EST PT 1/>: CPT | Performed by: OPHTHALMOLOGY

## 2021-06-14 PROCEDURE — 92134 CPTRZ OPH DX IMG PST SGM RTA: CPT | Performed by: OPHTHALMOLOGY

## 2021-06-14 PROCEDURE — 92250 FUNDUS PHOTOGRAPHY W/I&R: CPT | Performed by: OPHTHALMOLOGY

## 2021-06-14 ASSESSMENT — VISUAL ACUITY
OD_BCVA: 20/25+2
OS_BCVA: 20/20-2

## 2021-06-14 ASSESSMENT — CONFRONTATIONAL VISUAL FIELD TEST (CVF)
OD_FINDINGS: FULL
OS_FINDINGS: FULL

## 2021-06-15 LAB
Lab: NORMAL
MISCELLANEOUS LAB TEST RESULT: NORMAL
MISCELLANEOUS LAB TEST RESULT: NORMAL
STONE ANALYSIS-IMP: NORMAL
STONE ANALYSIS-IMP: NORMAL

## 2021-06-16 LAB
BACTERIA BLD CULT: NORMAL
BACTERIA BLD CULT: NORMAL

## 2021-06-17 ENCOUNTER — HOSPITAL ENCOUNTER (OUTPATIENT)
Dept: INFUSION CENTER | Facility: CLINIC | Age: 46
Discharge: HOME | End: 2021-06-17
Payer: COMMERCIAL

## 2021-06-17 VITALS
HEART RATE: 100 BPM | SYSTOLIC BLOOD PRESSURE: 109 MMHG | DIASTOLIC BLOOD PRESSURE: 77 MMHG | TEMPERATURE: 97.9 F | RESPIRATION RATE: 20 BRPM

## 2021-06-17 DIAGNOSIS — R63.0 ANOREXIA: Primary | ICD-10-CM

## 2021-06-17 DIAGNOSIS — E44.1 MILD PROTEIN-CALORIE MALNUTRITION (HCC): ICD-10-CM

## 2021-06-17 DIAGNOSIS — R19.7 DIARRHEA, UNSPECIFIED TYPE: ICD-10-CM

## 2021-06-17 PROCEDURE — 96360 HYDRATION IV INFUSION INIT: CPT

## 2021-06-17 PROCEDURE — 96361 HYDRATE IV INFUSION ADD-ON: CPT

## 2021-06-17 RX ORDER — SODIUM CHLORIDE, SODIUM LACTATE, POTASSIUM CHLORIDE, CALCIUM CHLORIDE 600; 310; 30; 20 MG/100ML; MG/100ML; MG/100ML; MG/100ML
1000 INJECTION, SOLUTION INTRAVENOUS ONCE
Status: COMPLETED | OUTPATIENT
Start: 2021-06-17 | End: 2021-06-17

## 2021-06-17 RX ORDER — SODIUM CHLORIDE, SODIUM LACTATE, POTASSIUM CHLORIDE, CALCIUM CHLORIDE 600; 310; 30; 20 MG/100ML; MG/100ML; MG/100ML; MG/100ML
1000 INJECTION, SOLUTION INTRAVENOUS ONCE
Status: CANCELLED
Start: 2021-06-24 | End: 2021-06-24

## 2021-06-17 RX ADMIN — SODIUM CHLORIDE, SODIUM LACTATE, POTASSIUM CHLORIDE, AND CALCIUM CHLORIDE 1000 ML: .6; .31; .03; .02 INJECTION, SOLUTION INTRAVENOUS at 13:33

## 2021-06-17 NOTE — PROGRESS NOTES
Pt arrived to unit without complaint  Pt tolerated hydration without incident  AVS declined, but pt aware of future appts  Pt left unit in stable condition

## 2021-06-24 ENCOUNTER — HOSPITAL ENCOUNTER (OUTPATIENT)
Dept: INFUSION CENTER | Facility: CLINIC | Age: 46
Discharge: HOME | End: 2021-06-24
Payer: COMMERCIAL

## 2021-06-24 VITALS
RESPIRATION RATE: 18 BRPM | HEART RATE: 142 BPM | SYSTOLIC BLOOD PRESSURE: 92 MMHG | DIASTOLIC BLOOD PRESSURE: 65 MMHG | TEMPERATURE: 97.6 F

## 2021-06-24 DIAGNOSIS — E44.1 MILD PROTEIN-CALORIE MALNUTRITION (HCC): ICD-10-CM

## 2021-06-24 DIAGNOSIS — R63.0 ANOREXIA: Primary | ICD-10-CM

## 2021-06-24 DIAGNOSIS — R19.7 DIARRHEA, UNSPECIFIED TYPE: ICD-10-CM

## 2021-06-24 PROCEDURE — 96361 HYDRATE IV INFUSION ADD-ON: CPT

## 2021-06-24 PROCEDURE — 96360 HYDRATION IV INFUSION INIT: CPT

## 2021-06-24 RX ORDER — SODIUM CHLORIDE, SODIUM LACTATE, POTASSIUM CHLORIDE, CALCIUM CHLORIDE 600; 310; 30; 20 MG/100ML; MG/100ML; MG/100ML; MG/100ML
1000 INJECTION, SOLUTION INTRAVENOUS ONCE
Status: COMPLETED | OUTPATIENT
Start: 2021-06-24 | End: 2021-06-24

## 2021-06-24 RX ORDER — SODIUM CHLORIDE, SODIUM LACTATE, POTASSIUM CHLORIDE, CALCIUM CHLORIDE 600; 310; 30; 20 MG/100ML; MG/100ML; MG/100ML; MG/100ML
1000 INJECTION, SOLUTION INTRAVENOUS ONCE
Status: CANCELLED
Start: 2021-07-01 | End: 2021-07-01

## 2021-06-24 RX ADMIN — SODIUM CHLORIDE, SODIUM LACTATE, POTASSIUM CHLORIDE, AND CALCIUM CHLORIDE 1000 ML: .6; .31; .03; .02 INJECTION, SOLUTION INTRAVENOUS at 11:20

## 2021-06-24 NOTE — PLAN OF CARE
Problem: Potential for Falls  Goal: Patient will remain free of falls  Description: INTERVENTIONS:  - Educate patient/family on patient safety including physical limitations  - Instruct patient to call for assistance with activity   - Consult OT/PT to assist with strengthening/mobility   - Keep Call bell within reach  - Keep bed low and locked with side rails adjusted as appropriate  - Keep care items and personal belongings within reach  - Initiate and maintain comfort rounds  - Make Fall Risk Sign visible to staff  - Offer Toileting every Hours, in advance of need  - Initiate/Maintain larm  - Obtain necessary fall risk management equipment:   - Apply yellow socks and bracelet for high fall risk patients  - Consider moving patient to room near nurses station  Outcome: Progressing

## 2021-06-24 NOTE — DISCHARGE SUMMARY
Discharge Summary - [facility] Internal Medicine    Patient Information: Pollo Li 38 y o  female MRN: [id number]  Unit/Bed#: [facility] 823-01 Encounter: [id number]    Discharging Physician / Practitioner: Harriet Schroeder DO  PCP: Oscar Jones PA-C  Admission Date: 9/10/2017  Discharge Date: 09/29/17    Reason for Admission: abdominal pain    Discharge Diagnoses:     Principal Problem:    Abdominal pain  Active Problems:    Colitis    Vitamin D deficiency    Generalized anxiety disorder    Acquired hypothyroidism    Abnormal CT of the abdomen    Leukocytosis    Thrombophlebitis    RUQ pain    Dysuria  Resolved Problems:    * No resolved hospital problems  *      Consultations During Hospital Stay:  · Dr Belle Espana - surg  · Dr Lilian Webb - GI  · Acute pain service  · Dr Susie Chapman - rheum    Procedures Performed:     · EGD/colon 9/12 showed chronic inactive gastritis  · ERCP 9/16 showed widely open sphincter of oddi  · FL Small bowel 9/26 WNL    Significant Findings / Test Results:     CT scan, MRI and MRCP while in here all with chronic finding of sphincterotomy and CBD dilation that are unchanged and maybe small stone or sludge in gallbladder stump  ESR, CRP, C3/C4  IgA/M/G are WNL   COREY, anticardiolipin, thyroid microsomial, LENA, RF, SSA, SSB are negative  Vit D level 24    Incidental Findings:   · none     Test Results Pending at Discharge (will require follow up):   · none     Outpatient Tests Requested:  · Pt told to go to [facility]  for anal manometry and other bowel transit studies    Complications:  none    Hospital Course:     Pollo Li is a 38 y o  female patient who originally presented to the hospital on 9/10/2017 due to abdominal pain  She had an extensive workup as noted above  On admission, she had low grade fever, so tx with 10 days of Zosyn although cx came back neg  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, and was continued on her long prednisone taper started outpt  We are starting your care today  You report history of hypothyroidism as well as being told you had cysts and or nodules  Labs have been ordered and please schedule the thyroid ultrasound  You also report history of 1 episode of kidney stones in the past never had before no symptoms currently no additional evaluation needed at this time  You are aware you will need to schedule with gyn for routine women's health  You report you are on a list for a dentist with appointments possibly in August or September  Will get you scheduled for a follow-up once we have your lab results  Please sign medical records release to have previous medical records sent here for review  Also had reported UC dx, although colon showed no evidence  Thought to have functional bowel syndrome  Thought a TCA may hlep her, but cannot take TCA as she has a corn starch allergy  Suggested she see her allergist at [facility] for desensitization  Pain was eventually able to get down to baseline 7 5/10 w/ xifaxan, protonix bid, and alternating oxycodone and tramadol  PT was able to tolerate about 1 meal per day and was noted to not be losing weight  Pt requested an ex-lap, but surgery said this is not indicated as she is on high dose prednisone for a long time  She may warrant an ex-lap outpt once she is off steroids  In the meantime, she was told to schedule appt with [facility] for the studies listed above  Pt was also noted to have low phos, which was suspected due to poor po intake and low vit D  Phos repleted  At d/c, phos WNL  Pt d/c on vit D supplement  Condition at Discharge: stable     Discharge Day Visit / Exam:     Subjective:  PT seen and examined  Abd symptoms same as yesterday  Vitals: Blood Pressure: 105/71 (09/29/17 0745)  Pulse: 92 (09/29/17 0745)  Temperature: 97 9 °F (36 6 °C) (09/29/17 0745)  Temp Source: Oral (09/29/17 0745)  Respirations: 18 (09/29/17 0745)  Height: 5' 5 25" (165 7 cm) (09/15/17 1431)  Weight - Scale: 64 5 kg (142 lb 3 2 oz) (09/28/17 0600)  SpO2: 97 % (09/29/17 0745)  Exam:   Physical Exam   Constitutional: She is oriented to person, place, and time  She appears well-developed and well-nourished  HENT:   Head: Normocephalic and atraumatic  Eyes: Conjunctivae and EOM are normal    Neck: Normal range of motion  Neck supple  Cardiovascular: Normal rate and regular rhythm  Pulmonary/Chest: Effort normal and breath sounds normal    Abdominal: Soft  Bowel sounds are normal  She exhibits distension (diffusely)  Musculoskeletal: Normal range of motion  She exhibits no edema  Neurological: She is alert and oriented to person, place, and time     Skin: Skin is warm and dry  Discussion with Family: pt    Discharge instructions/Information to patient and family:   See after visit summary for information provided to patient and family  Provisions for Follow-Up Care:  See after visit summary for information related to follow-up care and any pertinent home health orders  Disposition:     Home    For Discharges to [facility] SNF:   · Not Applicable to this Patient - Not Applicable to this Patient    Planned Readmission: no     Discharge Statement:  I spent 45 minutes discharging the patient  This time was spent on the day of discharge  I had direct contact with the patient on the day of discharge  Greater than 50% of the total time was spent examining patient, answering all patient questions, arranging and discussing plan of care with patient as well as directly providing post-discharge instructions  Additional time then spent on discharge activities  Discharge Medications:  See after visit summary for reconciled discharge medications provided to patient and family        ** Please Note: This note has been constructed using a voice recognition system **

## 2021-06-30 ENCOUNTER — TELEPHONE (OUTPATIENT)
Dept: FAMILY MEDICINE CLINIC | Facility: CLINIC | Age: 46
End: 2021-06-30

## 2021-07-01 ENCOUNTER — HOSPITAL ENCOUNTER (OUTPATIENT)
Dept: INFUSION CENTER | Facility: CLINIC | Age: 46
Discharge: HOME | End: 2021-07-01
Payer: COMMERCIAL

## 2021-07-01 VITALS
HEART RATE: 86 BPM | DIASTOLIC BLOOD PRESSURE: 80 MMHG | SYSTOLIC BLOOD PRESSURE: 123 MMHG | TEMPERATURE: 98.2 F | RESPIRATION RATE: 18 BRPM

## 2021-07-01 DIAGNOSIS — E44.1 MILD PROTEIN-CALORIE MALNUTRITION (HCC): ICD-10-CM

## 2021-07-01 DIAGNOSIS — R19.7 DIARRHEA, UNSPECIFIED TYPE: ICD-10-CM

## 2021-07-01 DIAGNOSIS — R63.0 ANOREXIA: Primary | ICD-10-CM

## 2021-07-01 PROCEDURE — 96360 HYDRATION IV INFUSION INIT: CPT

## 2021-07-01 PROCEDURE — 96361 HYDRATE IV INFUSION ADD-ON: CPT

## 2021-07-01 RX ORDER — SODIUM CHLORIDE, SODIUM LACTATE, POTASSIUM CHLORIDE, CALCIUM CHLORIDE 600; 310; 30; 20 MG/100ML; MG/100ML; MG/100ML; MG/100ML
1000 INJECTION, SOLUTION INTRAVENOUS ONCE
Status: CANCELLED
Start: 2021-07-08 | End: 2021-07-08

## 2021-07-01 RX ORDER — SODIUM CHLORIDE, SODIUM LACTATE, POTASSIUM CHLORIDE, CALCIUM CHLORIDE 600; 310; 30; 20 MG/100ML; MG/100ML; MG/100ML; MG/100ML
1000 INJECTION, SOLUTION INTRAVENOUS ONCE
Status: COMPLETED | OUTPATIENT
Start: 2021-07-01 | End: 2021-07-01

## 2021-07-01 RX ADMIN — SODIUM CHLORIDE, SODIUM LACTATE, POTASSIUM CHLORIDE, AND CALCIUM CHLORIDE 1000 ML: .6; .31; .03; .02 INJECTION, SOLUTION INTRAVENOUS at 12:57

## 2021-07-02 ENCOUNTER — OFFICE VISIT (OUTPATIENT)
Dept: FAMILY MEDICINE CLINIC | Facility: HOME HEALTHCARE | Age: 46
End: 2021-07-02
Payer: COMMERCIAL

## 2021-07-02 VITALS
OXYGEN SATURATION: 98 % | SYSTOLIC BLOOD PRESSURE: 124 MMHG | HEIGHT: 65 IN | BODY MASS INDEX: 24.69 KG/M2 | HEART RATE: 88 BPM | WEIGHT: 148.2 LBS | RESPIRATION RATE: 18 BRPM | DIASTOLIC BLOOD PRESSURE: 80 MMHG | TEMPERATURE: 97.6 F

## 2021-07-02 DIAGNOSIS — D84.9 IMMUNOSUPPRESSED STATUS (HCC): ICD-10-CM

## 2021-07-02 DIAGNOSIS — E66.3 OVERWEIGHT (BMI 25.0-29.9): ICD-10-CM

## 2021-07-02 DIAGNOSIS — G35 MS (MULTIPLE SCLEROSIS) (HCC): Primary | ICD-10-CM

## 2021-07-02 DIAGNOSIS — D89.40 MAST CELL ACTIVATION SYNDROME (HCC): ICD-10-CM

## 2021-07-02 DIAGNOSIS — Z13.31 DEPRESSION SCREENING: ICD-10-CM

## 2021-07-02 PROCEDURE — 99213 OFFICE O/P EST LOW 20 MIN: CPT | Performed by: FAMILY MEDICINE

## 2021-07-02 NOTE — PROGRESS NOTES
Assessment/Plan:    No problem-specific Assessment & Plan notes found for this encounter  Diagnoses and all orders for this visit:    MS (multiple sclerosis) ([facility] [address] )    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[facility] [address] )    Immunosuppressed status ([facility] [address] )    Overweight (BMI 25 0-29  9)    Depression screening      Mala is to RTC in 3 months for MAWV  Kwesi understood, acknowledged and agreed to the plan above  All her questions and concerns were answered and addressed  Case discussed with Dr Yumiko Ngo    Subjective:      Patient ID: Damián Nicolas is a 48 y o  female  Светлана Smith is a 49 y/o woman that was seen and examined in the office today for f/u  She is doing well today and has no complaints or concerns at this time  She was seen earlier this month by hematology who wrote "Светлана Smith is a 54 y/o woman presenting for further evaluation of mast cell activation syndrome in the setting of thrombocytosis, pancolitis for which she has received infliximab q6 weeks since January 2018 and is receiving treatment with Dr Lucy Marx for mast cell activation syndrome, and had a prior hx of superficial phlebitis of the LLE in 7060, diastolic heart failure, and degenerative disease of the lower spine  She had a negative [id number] but has not had further JAK2 exon testing, CALR, or MPL studies  Her past history has shown an extensive set of symptoms, potential triggers for symtpoms, and therapeutic trials  She has been on infliximab, xolaire, and most recently IVIG as well as prior trials of steroid bursts/tapers"    She is scheduled for an MRI with contrast and MCRP with GI  She continues to receive infusions of Stelara  She was also seen in the ED for RUQ pain and intractable HA  She had a sepsis work up that was negative  CT head, abd, pelvis, CXR, and bilateral venous duplex were unremarkable for any acute pathologies        The following portions of the patient's history were reviewed and updated as appropriate: allergies, current medications, past family history, past medical history, past social history, past surgical history and problem list     Review of Systems   All other systems reviewed and are negative  Objective:      /80   Pulse 88   Temp 97 6 °F (36 4 °C) (Tympanic)   Resp 18   Ht 5' 5 25" (1 657 m)   Wt 67 2 kg (148 lb 3 2 oz)   SpO2 98%   BMI 24 47 kg/m²          Physical Exam  Vitals and nursing note reviewed  Constitutional:       Appearance: Normal appearance  Comments: overweight   HENT:      Head: Normocephalic and atraumatic  Cardiovascular:      Rate and Rhythm: Normal rate and regular rhythm  Pulses: Normal pulses  Heart sounds: Normal heart sounds  Pulmonary:      Effort: Pulmonary effort is normal       Breath sounds: Normal breath sounds  Abdominal:      General: Abdomen is flat  Bowel sounds are normal       Palpations: Abdomen is soft  Tenderness: There is no right CVA tenderness or left CVA tenderness  Musculoskeletal:      Right lower leg: No edema  Left lower leg: No edema  Skin:     General: Skin is warm  Neurological:      General: No focal deficit present  Mental Status: She is alert and oriented to person, place, and time

## 2021-07-06 LAB — PHYTONADIONE SERPL-MCNC: 0.82 NMOL/L (ref 0.22–4.88)

## 2021-07-08 ENCOUNTER — HOSPITAL ENCOUNTER (OUTPATIENT)
Dept: INFUSION CENTER | Facility: CLINIC | Age: 46
Discharge: HOME | End: 2021-07-08
Payer: COMMERCIAL

## 2021-07-08 VITALS
SYSTOLIC BLOOD PRESSURE: 111 MMHG | RESPIRATION RATE: 18 BRPM | TEMPERATURE: 97.5 F | DIASTOLIC BLOOD PRESSURE: 75 MMHG | HEART RATE: 81 BPM

## 2021-07-08 DIAGNOSIS — R63.0 ANOREXIA: Primary | ICD-10-CM

## 2021-07-08 DIAGNOSIS — E44.1 MILD PROTEIN-CALORIE MALNUTRITION (HCC): ICD-10-CM

## 2021-07-08 DIAGNOSIS — R19.7 DIARRHEA, UNSPECIFIED TYPE: ICD-10-CM

## 2021-07-08 PROCEDURE — 96360 HYDRATION IV INFUSION INIT: CPT

## 2021-07-08 PROCEDURE — 96361 HYDRATE IV INFUSION ADD-ON: CPT

## 2021-07-08 RX ORDER — SODIUM CHLORIDE, SODIUM LACTATE, POTASSIUM CHLORIDE, CALCIUM CHLORIDE 600; 310; 30; 20 MG/100ML; MG/100ML; MG/100ML; MG/100ML
1000 INJECTION, SOLUTION INTRAVENOUS ONCE
Status: COMPLETED | OUTPATIENT
Start: 2021-07-08 | End: 2021-07-08

## 2021-07-08 RX ORDER — SODIUM CHLORIDE, SODIUM LACTATE, POTASSIUM CHLORIDE, CALCIUM CHLORIDE 600; 310; 30; 20 MG/100ML; MG/100ML; MG/100ML; MG/100ML
1000 INJECTION, SOLUTION INTRAVENOUS ONCE
Status: CANCELLED
Start: 2021-07-15 | End: 2021-07-15

## 2021-07-08 RX ADMIN — SODIUM CHLORIDE, SODIUM LACTATE, POTASSIUM CHLORIDE, AND CALCIUM CHLORIDE 1000 ML: .6; .31; .03; .02 INJECTION, SOLUTION INTRAVENOUS at 12:20

## 2021-07-12 ENCOUNTER — HOSPITAL ENCOUNTER (OUTPATIENT)
Dept: MRI IMAGING | Facility: HOSPITAL | Age: 46
Discharge: HOME | End: 2021-07-12
Attending: INTERNAL MEDICINE
Payer: COMMERCIAL

## 2021-07-12 ENCOUNTER — DOCUMENTATION (OUTPATIENT)
Dept: BEHAVIORAL/MENTAL HEALTH CLINIC | Facility: CLINIC | Age: 46
End: 2021-07-12

## 2021-07-12 DIAGNOSIS — K80.50 CHOLEDOCHOLITHIASIS: ICD-10-CM

## 2021-07-12 PROCEDURE — A9585 GADOBUTROL INJECTION: HCPCS | Performed by: INTERNAL MEDICINE

## 2021-07-12 PROCEDURE — 74183 MRI ABD W/O CNTR FLWD CNTR: CPT

## 2021-07-12 PROCEDURE — G1004 CDSM NDSC: HCPCS

## 2021-07-12 PROCEDURE — 76376 3D RENDER W/INTRP POSTPROCES: CPT

## 2021-07-12 RX ADMIN — GADOBUTROL 6 ML: 604.72 INJECTION INTRAVENOUS at 18:31

## 2021-07-12 NOTE — PROGRESS NOTES
07/12/2021 Letter was sent to the Client's residence to see if she is still interested in [facility] services  The Client had missed appointments on 02/19/21, 04/5/21, and 05/03/21

## 2021-07-15 ENCOUNTER — HOSPITAL ENCOUNTER (OUTPATIENT)
Dept: INFUSION CENTER | Facility: CLINIC | Age: 46
End: 2021-07-15

## 2021-07-22 ENCOUNTER — HOSPITAL ENCOUNTER (OUTPATIENT)
Dept: INFUSION CENTER | Facility: CLINIC | Age: 46
Discharge: HOME | End: 2021-07-22
Payer: COMMERCIAL

## 2021-07-22 VITALS
TEMPERATURE: 99 F | DIASTOLIC BLOOD PRESSURE: 82 MMHG | RESPIRATION RATE: 16 BRPM | HEART RATE: 86 BPM | SYSTOLIC BLOOD PRESSURE: 119 MMHG

## 2021-07-22 DIAGNOSIS — R19.7 DIARRHEA, UNSPECIFIED TYPE: ICD-10-CM

## 2021-07-22 DIAGNOSIS — E44.1 MILD PROTEIN-CALORIE MALNUTRITION (HCC): ICD-10-CM

## 2021-07-22 DIAGNOSIS — R63.0 ANOREXIA: Primary | ICD-10-CM

## 2021-07-22 PROCEDURE — 96360 HYDRATION IV INFUSION INIT: CPT

## 2021-07-22 PROCEDURE — 96361 HYDRATE IV INFUSION ADD-ON: CPT

## 2021-07-22 RX ORDER — SODIUM CHLORIDE, SODIUM LACTATE, POTASSIUM CHLORIDE, CALCIUM CHLORIDE 600; 310; 30; 20 MG/100ML; MG/100ML; MG/100ML; MG/100ML
1000 INJECTION, SOLUTION INTRAVENOUS ONCE
Status: CANCELLED
Start: 2021-07-28 | End: 2021-07-29

## 2021-07-22 RX ORDER — SODIUM CHLORIDE, SODIUM LACTATE, POTASSIUM CHLORIDE, CALCIUM CHLORIDE 600; 310; 30; 20 MG/100ML; MG/100ML; MG/100ML; MG/100ML
1000 INJECTION, SOLUTION INTRAVENOUS ONCE
Status: COMPLETED | OUTPATIENT
Start: 2021-07-22 | End: 2021-07-22

## 2021-07-22 RX ADMIN — SODIUM CHLORIDE, SODIUM LACTATE, POTASSIUM CHLORIDE, AND CALCIUM CHLORIDE 1000 ML: .6; .31; .03; .02 INJECTION, SOLUTION INTRAVENOUS at 12:01

## 2021-07-22 NOTE — PLAN OF CARE
Problem: Potential for Falls  Goal: Patient will remain free of falls  Description: INTERVENTIONS:  - Educate patient/family on patient safety including physical limitations  - Instruct patient to call for assistance with activity     - Keep Call bell within reach    Outcome: Progressing

## 2021-07-22 NOTE — PROGRESS NOTES
Patient tolerated IV hydration, declined AVS  Patient aware of next appointment and left infusion center in baseline condition

## 2021-07-28 ENCOUNTER — HOSPITAL ENCOUNTER (OUTPATIENT)
Dept: INFUSION CENTER | Facility: CLINIC | Age: 46
Discharge: HOME | End: 2021-07-28
Payer: COMMERCIAL

## 2021-07-28 VITALS
DIASTOLIC BLOOD PRESSURE: 80 MMHG | RESPIRATION RATE: 18 BRPM | SYSTOLIC BLOOD PRESSURE: 116 MMHG | TEMPERATURE: 98.4 F | HEART RATE: 84 BPM

## 2021-07-28 DIAGNOSIS — E44.1 MILD PROTEIN-CALORIE MALNUTRITION (HCC): ICD-10-CM

## 2021-07-28 DIAGNOSIS — R19.7 DIARRHEA, UNSPECIFIED TYPE: ICD-10-CM

## 2021-07-28 DIAGNOSIS — R63.0 ANOREXIA: Primary | ICD-10-CM

## 2021-07-28 PROCEDURE — 96360 HYDRATION IV INFUSION INIT: CPT

## 2021-07-28 PROCEDURE — 96361 HYDRATE IV INFUSION ADD-ON: CPT

## 2021-07-28 RX ORDER — SODIUM CHLORIDE, SODIUM LACTATE, POTASSIUM CHLORIDE, CALCIUM CHLORIDE 600; 310; 30; 20 MG/100ML; MG/100ML; MG/100ML; MG/100ML
1000 INJECTION, SOLUTION INTRAVENOUS ONCE
Status: COMPLETED | OUTPATIENT
Start: 2021-07-28 | End: 2021-07-28

## 2021-07-28 RX ORDER — SODIUM CHLORIDE, SODIUM LACTATE, POTASSIUM CHLORIDE, CALCIUM CHLORIDE 600; 310; 30; 20 MG/100ML; MG/100ML; MG/100ML; MG/100ML
1000 INJECTION, SOLUTION INTRAVENOUS ONCE
Status: CANCELLED
Start: 2021-08-05 | End: 2021-07-29

## 2021-07-28 RX ADMIN — SODIUM CHLORIDE, SODIUM LACTATE, POTASSIUM CHLORIDE, AND CALCIUM CHLORIDE 1000 ML: .6; .31; .03; .02 INJECTION, SOLUTION INTRAVENOUS at 14:02

## 2021-07-29 ENCOUNTER — DOCUMENTATION (OUTPATIENT)
Dept: BEHAVIORAL/MENTAL HEALTH CLINIC | Facility: CLINIC | Age: 46
End: 2021-07-29

## 2021-07-29 NOTE — PROGRESS NOTES
Assessment/Plan:      There are no diagnoses linked to this encounter  Subjective:     Patient ID: Barry Glaser is a 48 y o  female  Outpatient Discharge Summary:   Admission Date: 02/05/21  Brittany Blackwell was referred by her PCP  Discharge Date: 07/29/21    Discharge Diagnosis:    No diagnosis found      Treating Physician: [address]  Treatment Complications: lack of attendence  Presenting Problem: depression and anxiety  Course of treatment includes:    individual therapy   Treatment Progress: poor  Criteria for Discharge: the client missed [facility] appointments letter was sent without responce  Aftercare recommendations include to return to mental health therapy if needed  Discharge Medications include:  Current Outpatient Medications:     acetaminophen (TYLENOL) 500 mg tablet, Take 1,000 mg by mouth every 6 (six) hours as needed for mild pain, Disp: , Rfl:     budesonide (ENTOCORT EC) 3 MG capsule, Take 1 capsule (3 mg total) by mouth 3 (three) times a day Before meals, Disp: 90 capsule, Rfl: 2    busPIRone (BUSPAR) 5 mg tablet, TAKE ONE TABLET BY MOUTH EVERY 24 HOURS, Disp: 30 tablet, Rfl: 0    butalbital-acetaminophen-caffeine (FIORICET,ESGIC) -40 mg per tablet, TAKE ONE TABLET BY MOUTH EVERY 8 HOURS AS NEEDED FOR HEADACHES, Disp: 20 tablet, Rfl: 4    calcium citrate (CALCITRATE) 950 (200 Ca) MG tablet, Take 1 tablet (950 mg total) by mouth daily, Disp: 90 tablet, Rfl: 3    cetirizine (ZyrTEC) 10 mg tablet, Take 20 mg by mouth daily , Disp: , Rfl:     cholecalciferol (VITAMIN D) 400 units/mL, Take 2 5 mL (1,000 Units total) by mouth daily (Patient taking differently: Take 1,200 Units by mouth daily ), Disp: 1 Bottle, Rfl: 5    cromolyn (GASTROCROM) 100 MG/5ML solution,  Start: 11/16/20 8:54:00 EST, only when in Green zone 2 vials bid, Disp: , Rfl:     cyanocobalamin 1,000 mcg/mL, , Disp: , Rfl:     diclofenac (VOLTAREN) 75 mg EC tablet, Take 1 tablet (75 mg total) by mouth 2 (two) times a day for 21 days (Patient taking differently: Take 75 mg by mouth as needed ), Disp: 42 tablet, Rfl: 2    diphenhydrAMINE (BENADRYL) 25 mg tablet, Take 50 mg by mouth 2 (two) times a day , Disp: , Rfl:     EPINEPHrine (EPIPEN 2-MARYSOL IJ), EpiPen  prn, Disp: , Rfl:     ergocalciferol (VITAMIN D2) 50,000 units, , Disp: , Rfl:     ergocalciferol (VITAMIN D2) 50,000 units,  Start: 02/22/21 17:37:00 EST, See Instructions, 50,000 Int_Unit PO 1 time per week, Disp: , Rfl:     hydrOXYzine HCL (ATARAX) 25 mg tablet, Take 25 mg by mouth 4 (four) times a day , Disp: , Rfl:     levalbuterol (XOPENEX HFA) 45 mcg/act inhaler, Inhale 1-2 puffs every 4 (four) hours as needed for shortness of breath, Disp: , Rfl:     levothyroxine 50 mcg tablet, Take 50 mcg by mouth daily, Disp: , Rfl:     liothyronine (CYTOMEL) 5 mcg tablet, Take 5 mcg by mouth daily, Disp: , Rfl:     LORazepam (ATIVAN) 1 mg tablet, Take 1 tablet (1 mg total) by mouth every 8 (eight) hours as needed for anxiety, Disp: 21 tablet, Rfl: 0    montelukast (SINGULAIR) 10 mg tablet, Take 1 tablet (10 mg total) by mouth daily at bedtime, Disp: 30 tablet, Rfl: 0    NON FORMULARY, immunoglobin sq 6g 30 ml once a week, Disp: , Rfl:     omalizumab (XOLAIR) 150 mg, Inject 300 mg under the skin every 28 days , Disp: , Rfl:     pantoprazole (PROTONIX) 40 mg tablet, Take 1 tablet (40 mg total) by mouth daily, Disp: 30 tablet, Rfl: 3    polyethylene glycol (GLYCOLAX) 17 GM/SCOOP powder, Take 17 g by mouth daily as needed (constipation), Disp: 17 g, Rfl: 0    predniSONE 20 mg tablet, Take 2 tablets (40 mg total) by mouth daily (Patient taking differently: Take 5 mg by mouth once a week ), Disp: 30 tablet, Rfl: 0    Riboflavin (Vitamin B-2) 100 MG TABS, Take 4 tablets (400 mg total) by mouth daily, Disp: 360 tablet, Rfl: 3    Stelara 90 MG/ML subcutaneous injection, , Disp: , Rfl:     temazepam (RESTORIL) 30 mg capsule, Take 1 capsule (30 mg total) by mouth daily at bedtime as needed for sleep, Disp: 30 capsule, Rfl: 5    ustekinumab (Stelara) 90 mg/mL subcutaneous injection, Inject 1 mL (90 mg total) under the skin every 28 days, Disp: 1 Syringe, Rfl: 6    Current Facility-Administered Medications:     cyanocobalamin injection 1,000 mcg, 1,000 mcg, Intramuscular, Q30 Days, Marilin Gibbons PA-C, 1,000 mcg at 10/25/19 1200    Prognosis: poor

## 2021-08-05 ENCOUNTER — HOSPITAL ENCOUNTER (OUTPATIENT)
Dept: INFUSION CENTER | Facility: CLINIC | Age: 46
End: 2021-08-05

## 2021-08-12 ENCOUNTER — HOSPITAL ENCOUNTER (OUTPATIENT)
Dept: INFUSION CENTER | Facility: CLINIC | Age: 46
Discharge: HOME | End: 2021-08-12
Payer: COMMERCIAL

## 2021-08-12 VITALS
HEART RATE: 84 BPM | RESPIRATION RATE: 18 BRPM | TEMPERATURE: 97.8 F | DIASTOLIC BLOOD PRESSURE: 81 MMHG | SYSTOLIC BLOOD PRESSURE: 118 MMHG

## 2021-08-12 DIAGNOSIS — R19.7 DIARRHEA, UNSPECIFIED TYPE: ICD-10-CM

## 2021-08-12 DIAGNOSIS — E44.1 MILD PROTEIN-CALORIE MALNUTRITION (HCC): ICD-10-CM

## 2021-08-12 DIAGNOSIS — R63.0 ANOREXIA: Primary | ICD-10-CM

## 2021-08-12 PROCEDURE — 96360 HYDRATION IV INFUSION INIT: CPT

## 2021-08-12 PROCEDURE — 96361 HYDRATE IV INFUSION ADD-ON: CPT

## 2021-08-12 RX ORDER — SODIUM CHLORIDE, SODIUM LACTATE, POTASSIUM CHLORIDE, CALCIUM CHLORIDE 600; 310; 30; 20 MG/100ML; MG/100ML; MG/100ML; MG/100ML
1000 INJECTION, SOLUTION INTRAVENOUS ONCE
Status: COMPLETED | OUTPATIENT
Start: 2021-08-12 | End: 2021-08-12

## 2021-08-12 RX ORDER — SODIUM CHLORIDE, SODIUM LACTATE, POTASSIUM CHLORIDE, CALCIUM CHLORIDE 600; 310; 30; 20 MG/100ML; MG/100ML; MG/100ML; MG/100ML
1000 INJECTION, SOLUTION INTRAVENOUS ONCE
Status: CANCELLED
Start: 2021-08-19 | End: 2021-08-19

## 2021-08-12 RX ADMIN — SODIUM CHLORIDE, SODIUM LACTATE, POTASSIUM CHLORIDE, AND CALCIUM CHLORIDE 1000 ML: .6; .31; .03; .02 INJECTION, SOLUTION INTRAVENOUS at 13:14

## 2021-08-12 NOTE — PROGRESS NOTES
Patient tolerated hydration with no complications  Pt is aware of all future appointments   Declined AVS

## 2021-08-18 ENCOUNTER — APPOINTMENT (OUTPATIENT)
Dept: LAB | Facility: HOSPITAL | Age: 46
End: 2021-08-18
Payer: COMMERCIAL

## 2021-08-18 DIAGNOSIS — E05.90 PRETIBIAL MYXEDEMA: ICD-10-CM

## 2021-08-18 DIAGNOSIS — E03.9 PRIMARY HYPOTHYROIDISM: ICD-10-CM

## 2021-08-18 DIAGNOSIS — R09.81 NASAL CONGESTION: ICD-10-CM

## 2021-08-18 LAB
25(OH)D3 SERPL-MCNC: 54.5 NG/ML (ref 30–100)
ALBUMIN SERPL BCP-MCNC: 4 G/DL (ref 3.5–5)
ALP SERPL-CCNC: 61 U/L (ref 46–116)
ALT SERPL W P-5'-P-CCNC: 24 U/L (ref 12–78)
ANION GAP SERPL CALCULATED.3IONS-SCNC: 13 MMOL/L (ref 4–13)
AST SERPL W P-5'-P-CCNC: 15 U/L (ref 5–45)
BASOPHILS # BLD AUTO: 0.12 THOUSANDS/ΜL (ref 0–0.1)
BASOPHILS NFR BLD AUTO: 2 % (ref 0–1)
BILIRUB SERPL-MCNC: 0.53 MG/DL (ref 0.2–1)
BUN SERPL-MCNC: 12 MG/DL (ref 5–25)
CALCIUM SERPL-MCNC: 8.7 MG/DL (ref 8.3–10.1)
CHLORIDE SERPL-SCNC: 105 MMOL/L (ref 100–108)
CO2 SERPL-SCNC: 23 MMOL/L (ref 21–32)
CREAT SERPL-MCNC: 0.74 MG/DL (ref 0.6–1.3)
EOSINOPHIL # BLD AUTO: 0.2 THOUSAND/ΜL (ref 0–0.61)
EOSINOPHIL NFR BLD AUTO: 3 % (ref 0–6)
ERYTHROCYTE [DISTWIDTH] IN BLOOD BY AUTOMATED COUNT: 13.2 % (ref 11.6–15.1)
GFR SERPL CREATININE-BSD FRML MDRD: 96 ML/MIN/1.73SQ M
GLUCOSE SERPL-MCNC: 95 MG/DL (ref 65–140)
HCT VFR BLD AUTO: 43 % (ref 34.8–46.1)
HGB BLD-MCNC: 14.2 G/DL (ref 11.5–15.4)
IMM GRANULOCYTES # BLD AUTO: 0.01 THOUSAND/UL (ref 0–0.2)
IMM GRANULOCYTES NFR BLD AUTO: 0 % (ref 0–2)
LYMPHOCYTES # BLD AUTO: 2.63 THOUSANDS/ΜL (ref 0.6–4.47)
LYMPHOCYTES NFR BLD AUTO: 35 % (ref 14–44)
MCH RBC QN AUTO: 29.8 PG (ref 26.8–34.3)
MCHC RBC AUTO-ENTMCNC: 33 G/DL (ref 31.4–37.4)
MCV RBC AUTO: 90 FL (ref 82–98)
MONOCYTES # BLD AUTO: 0.75 THOUSAND/ΜL (ref 0.17–1.22)
MONOCYTES NFR BLD AUTO: 10 % (ref 4–12)
NEUTROPHILS # BLD AUTO: 3.74 THOUSANDS/ΜL (ref 1.85–7.62)
NEUTS SEG NFR BLD AUTO: 50 % (ref 43–75)
NRBC BLD AUTO-RTO: 0 /100 WBCS
PLATELET # BLD AUTO: 406 THOUSANDS/UL (ref 149–390)
PMV BLD AUTO: 8.6 FL (ref 8.9–12.7)
POTASSIUM SERPL-SCNC: 3.5 MMOL/L (ref 3.5–5.3)
PROT SERPL-MCNC: 8 G/DL (ref 6.4–8.2)
RBC # BLD AUTO: 4.76 MILLION/UL (ref 3.81–5.12)
SODIUM SERPL-SCNC: 141 MMOL/L (ref 136–145)
VIT B12 SERPL-MCNC: 667 PG/ML (ref 100–900)
WBC # BLD AUTO: 7.45 THOUSAND/UL (ref 4.31–10.16)

## 2021-08-18 PROCEDURE — 36415 COLL VENOUS BLD VENIPUNCTURE: CPT

## 2021-08-18 PROCEDURE — 86800 THYROGLOBULIN ANTIBODY: CPT

## 2021-08-18 PROCEDURE — 86003 ALLG SPEC IGE CRUDE XTRC EA: CPT

## 2021-08-18 PROCEDURE — 84432 ASSAY OF THYROGLOBULIN: CPT

## 2021-08-18 PROCEDURE — 84597 ASSAY OF VITAMIN K: CPT

## 2021-08-18 PROCEDURE — 82607 VITAMIN B-12: CPT

## 2021-08-18 PROCEDURE — 80053 COMPREHEN METABOLIC PANEL: CPT

## 2021-08-18 PROCEDURE — 85025 COMPLETE CBC W/AUTO DIFF WBC: CPT

## 2021-08-18 PROCEDURE — 82785 ASSAY OF IGE: CPT

## 2021-08-18 PROCEDURE — 82306 VITAMIN D 25 HYDROXY: CPT

## 2021-08-19 ENCOUNTER — HOSPITAL ENCOUNTER (OUTPATIENT)
Dept: INFUSION CENTER | Facility: CLINIC | Age: 46
Discharge: HOME | End: 2021-08-19
Payer: COMMERCIAL

## 2021-08-19 VITALS
HEART RATE: 102 BPM | DIASTOLIC BLOOD PRESSURE: 87 MMHG | RESPIRATION RATE: 18 BRPM | SYSTOLIC BLOOD PRESSURE: 121 MMHG | TEMPERATURE: 98.3 F

## 2021-08-19 DIAGNOSIS — R63.0 ANOREXIA: Primary | ICD-10-CM

## 2021-08-19 DIAGNOSIS — R19.7 DIARRHEA, UNSPECIFIED TYPE: ICD-10-CM

## 2021-08-19 DIAGNOSIS — E44.1 MILD PROTEIN-CALORIE MALNUTRITION (HCC): ICD-10-CM

## 2021-08-19 LAB
A ALTERNATA IGE QN: <0.1 KUA/I
A FUMIGATUS IGE QN: <0.1 KUA/I
ALLERGEN COMMENT: NORMAL
BERMUDA GRASS IGE QN: <0.1 KUA/I
BOXELDER IGE QN: <0.1 KUA/I
C HERBARUM IGE QN: <0.1 KUA/I
CAT DANDER IGE QN: <0.1 KUA/I
CMN PIGWEED IGE QN: <0.1 KUA/I
COMMON RAGWEED IGE QN: <0.1 KUA/I
COTTONWOOD IGE QN: <0.1 KUA/I
D FARINAE IGE QN: <0.1 KUA/I
D PTERONYSS IGE QN: <0.1 KUA/I
DOG DANDER IGE QN: <0.1 KUA/I
LONDON PLANE IGE QN: <0.1 KUA/I
MOUSE URINE PROT IGE QN: <0.1 KUA/I
MT JUNIPER IGE QN: <0.1 KUA/I
MUGWORT IGE QN: <0.1 KUA/I
P NOTATUM IGE QN: <0.1 KUA/I
ROACH IGE QN: <0.1 KUA/I
SHEEP SORREL IGE QN: <0.1 KUA/I
SILVER BIRCH IGE QN: <0.1 KUA/I
TIMOTHY IGE QN: <0.1 KUA/I
TOTAL IGE SMQN RAST: 43.8 KU/L (ref 0–113)
WALNUT IGE QN: <0.1 KUA/I
WHITE ASH IGE QN: <0.1 KUA/I
WHITE ELM IGE QN: <0.1 KUA/I
WHITE MULBERRY IGE QN: <0.1 KUA/I
WHITE OAK IGE QN: <0.1 KUA/I

## 2021-08-19 PROCEDURE — 96361 HYDRATE IV INFUSION ADD-ON: CPT

## 2021-08-19 PROCEDURE — 96360 HYDRATION IV INFUSION INIT: CPT

## 2021-08-19 RX ORDER — SODIUM CHLORIDE, SODIUM LACTATE, POTASSIUM CHLORIDE, CALCIUM CHLORIDE 600; 310; 30; 20 MG/100ML; MG/100ML; MG/100ML; MG/100ML
1000 INJECTION, SOLUTION INTRAVENOUS ONCE
Status: CANCELLED
Start: 2021-08-26 | End: 2021-08-26

## 2021-08-19 RX ORDER — SODIUM CHLORIDE, SODIUM LACTATE, POTASSIUM CHLORIDE, CALCIUM CHLORIDE 600; 310; 30; 20 MG/100ML; MG/100ML; MG/100ML; MG/100ML
1000 INJECTION, SOLUTION INTRAVENOUS ONCE
Status: COMPLETED | OUTPATIENT
Start: 2021-08-19 | End: 2021-08-19

## 2021-08-19 RX ADMIN — SODIUM CHLORIDE, SODIUM LACTATE, POTASSIUM CHLORIDE, AND CALCIUM CHLORIDE 1000 ML: .6; .31; .03; .02 INJECTION, SOLUTION INTRAVENOUS at 13:36

## 2021-08-22 LAB — CULTIVATED CORN IGE QN: <0.1 KU/L

## 2021-08-23 ENCOUNTER — TELEPHONE (OUTPATIENT)
Dept: GASTROENTEROLOGY | Facility: CLINIC | Age: 46
End: 2021-08-23

## 2021-08-23 ENCOUNTER — TELEPHONE (OUTPATIENT)
Dept: GASTROENTEROLOGY | Facility: MEDICAL CENTER | Age: 46
End: 2021-08-23

## 2021-08-23 DIAGNOSIS — K52.9 INFLAMMATORY BOWEL DISEASE: ICD-10-CM

## 2021-08-23 RX ORDER — PREDNISONE 20 MG/1
TABLET ORAL
Qty: 100 TABLET | Refills: 0 | Status: SHIPPED | OUTPATIENT
Start: 2021-08-23 | End: 2021-12-02 | Stop reason: SDUPTHER

## 2021-08-23 RX ORDER — PANTOPRAZOLE SODIUM 40 MG/1
40 TABLET, DELAYED RELEASE ORAL DAILY
Qty: 30 TABLET | Refills: 3 | Status: SHIPPED | OUTPATIENT
Start: 2021-08-23 | End: 2021-12-09 | Stop reason: SDUPTHER

## 2021-08-23 NOTE — TELEPHONE ENCOUNTER
Prosper Quezada called requesting refills for her  Prednisone 20 mg, and pantoprazole - she asked we sent the refill directly to you, as she mentioned her refills are brand and dose specific, and that you specifically are aware of this  Hopefully this is alright, she can be reached at [phone] with any questions    Thanks so much

## 2021-08-23 NOTE — TELEPHONE ENCOUNTER
Called patient and added onto schedule for 9/13/21 with Dr Melissa Watkins as requested by provider  Date, time and location were provided to patient

## 2021-08-23 NOTE — TELEPHONE ENCOUNTER
----- Message from Zandra Earl MA sent at 8/23/2021  2:17 PM EDT -----  Regarding: FW:Medications  Contact: [phone]    ----- Message -----  From: Bruna Pham  Sent: 8/23/2021   2:16 PM EDT  To: , #  Subject: RE:Medications                                   Currently I am weaned off the prednisone as of 7/15/2021  This is my 3rd attempt and within 3 weeks symptoms start coming back as I start getting closer the the next [facility] treatment  I am at the 5 month marker for the stelera at its new dosing of 90mg every 4 weeks  I have no 20mg prednisone left if needed or if I need to go for any testing and need to pre medicate  Remember I had issues with the 10mg and 5mg tablets but none with the 20mg tablets so I just break them down by quarter's depending on the wean off/taper dosing needed  I hope this makes sense  If not feel free to call me to further explain  My last script was 12/22/2021 for qty  120 tablets of 20mg  So as you can see by the math, I have had numerous periods off and weeks of low dosing to stretch out to this day, however I never know what's coming   And 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 I also need to schedule my office visit and know you are typically booked out months and have to approve to squeeze me in, so could you please do so and I will schedule  September 14th is the 6th month marker for Stelera at 90mg every 4 weeks     my lenora date  Thank you Dr Gil Kaufman for your prompt response to my refill request     It's very much appreciated  I look forward to seeing you and updating you on my progress      Sincerely,  Ya Carter     phone:  [phone]

## 2021-08-25 LAB
THYROGLOB AB SERPL-ACNC: 1.2 IU/ML (ref 0–0.9)
THYROGLOB SERPL-MCNC: 15 NG/ML

## 2021-08-26 ENCOUNTER — HOSPITAL ENCOUNTER (OUTPATIENT)
Dept: INFUSION CENTER | Facility: CLINIC | Age: 46
Discharge: HOME | End: 2021-08-26
Payer: COMMERCIAL

## 2021-08-26 DIAGNOSIS — E44.1 MILD PROTEIN-CALORIE MALNUTRITION (HCC): ICD-10-CM

## 2021-08-26 DIAGNOSIS — R19.7 DIARRHEA, UNSPECIFIED TYPE: ICD-10-CM

## 2021-08-26 DIAGNOSIS — R63.0 ANOREXIA: Primary | ICD-10-CM

## 2021-08-26 LAB — PHYTONADIONE SERPL-MCNC: 0.06 NG/ML (ref 0.1–2.2)

## 2021-08-26 PROCEDURE — 96361 HYDRATE IV INFUSION ADD-ON: CPT

## 2021-08-26 PROCEDURE — 96360 HYDRATION IV INFUSION INIT: CPT

## 2021-08-26 RX ORDER — SODIUM CHLORIDE, SODIUM LACTATE, POTASSIUM CHLORIDE, CALCIUM CHLORIDE 600; 310; 30; 20 MG/100ML; MG/100ML; MG/100ML; MG/100ML
1000 INJECTION, SOLUTION INTRAVENOUS ONCE
Status: COMPLETED | OUTPATIENT
Start: 2021-08-26 | End: 2021-08-26

## 2021-08-26 RX ORDER — SODIUM CHLORIDE, SODIUM LACTATE, POTASSIUM CHLORIDE, CALCIUM CHLORIDE 600; 310; 30; 20 MG/100ML; MG/100ML; MG/100ML; MG/100ML
1000 INJECTION, SOLUTION INTRAVENOUS ONCE
Status: CANCELLED
Start: 2021-09-02 | End: 2021-09-02

## 2021-08-26 RX ADMIN — SODIUM CHLORIDE, SODIUM LACTATE, POTASSIUM CHLORIDE, AND CALCIUM CHLORIDE 1000 ML: .6; .31; .03; .02 INJECTION, SOLUTION INTRAVENOUS at 12:15

## 2021-09-02 ENCOUNTER — HOSPITAL ENCOUNTER (OUTPATIENT)
Dept: INFUSION CENTER | Facility: CLINIC | Age: 46
Discharge: HOME | End: 2021-09-02
Payer: COMMERCIAL

## 2021-09-02 VITALS
SYSTOLIC BLOOD PRESSURE: 112 MMHG | DIASTOLIC BLOOD PRESSURE: 75 MMHG | HEART RATE: 80 BPM | RESPIRATION RATE: 18 BRPM | TEMPERATURE: 97.7 F

## 2021-09-02 DIAGNOSIS — R19.7 DIARRHEA, UNSPECIFIED TYPE: ICD-10-CM

## 2021-09-02 DIAGNOSIS — E44.1 MILD PROTEIN-CALORIE MALNUTRITION (HCC): ICD-10-CM

## 2021-09-02 DIAGNOSIS — R63.0 ANOREXIA: Primary | ICD-10-CM

## 2021-09-02 PROCEDURE — 96361 HYDRATE IV INFUSION ADD-ON: CPT

## 2021-09-02 PROCEDURE — 96360 HYDRATION IV INFUSION INIT: CPT

## 2021-09-02 RX ORDER — SODIUM CHLORIDE, SODIUM LACTATE, POTASSIUM CHLORIDE, CALCIUM CHLORIDE 600; 310; 30; 20 MG/100ML; MG/100ML; MG/100ML; MG/100ML
1000 INJECTION, SOLUTION INTRAVENOUS ONCE
Status: COMPLETED | OUTPATIENT
Start: 2021-09-02 | End: 2021-09-02

## 2021-09-02 RX ORDER — SODIUM CHLORIDE, SODIUM LACTATE, POTASSIUM CHLORIDE, CALCIUM CHLORIDE 600; 310; 30; 20 MG/100ML; MG/100ML; MG/100ML; MG/100ML
1000 INJECTION, SOLUTION INTRAVENOUS ONCE
Status: CANCELLED
Start: 2021-09-10 | End: 2021-09-09

## 2021-09-02 RX ADMIN — SODIUM CHLORIDE, SODIUM LACTATE, POTASSIUM CHLORIDE, AND CALCIUM CHLORIDE 1000 ML: .6; .31; .03; .02 INJECTION, SOLUTION INTRAVENOUS at 12:48

## 2021-09-02 NOTE — PLAN OF CARE
Problem: Potential for Falls  Goal: Patient will remain free of falls  Description: INTERVENTIONS:  - Educate patient/family on patient safety including physical limitations  - Instruct patient to call for assistance with activity   - Consult OT/PT to assist with strengthening/mobility   - Keep Call bell within reach  - Keep bed low and locked with side rails adjusted as appropriate  - Keep care items and personal belongings within reach  - Initiate and maintain comfort rounds  - Make Fall Risk Sign visible to staff  -   - Apply yellow socks and bracelet for high fall risk patients  - Consider moving patient to room near nurses station  Outcome: Progressing

## 2021-09-13 ENCOUNTER — OFFICE VISIT (OUTPATIENT)
Dept: GASTROENTEROLOGY | Facility: MEDICAL CENTER | Age: 46
End: 2021-09-13
Payer: COMMERCIAL

## 2021-09-13 VITALS
DIASTOLIC BLOOD PRESSURE: 70 MMHG | SYSTOLIC BLOOD PRESSURE: 124 MMHG | BODY MASS INDEX: 25.63 KG/M2 | HEART RATE: 78 BPM | TEMPERATURE: 98.3 F | WEIGHT: 155.2 LBS

## 2021-09-13 DIAGNOSIS — K50.119 CROHN'S DISEASE OF COLON WITH COMPLICATION (HCC): Primary | ICD-10-CM

## 2021-09-13 DIAGNOSIS — R19.7 DIARRHEA, UNSPECIFIED TYPE: ICD-10-CM

## 2021-09-13 DIAGNOSIS — K52.9 INFLAMMATORY BOWEL DISEASE: ICD-10-CM

## 2021-09-13 DIAGNOSIS — R10.11 RIGHT UPPER QUADRANT ABDOMINAL PAIN: ICD-10-CM

## 2021-09-13 DIAGNOSIS — Z79.52 CURRENT CHRONIC USE OF SYSTEMIC STEROIDS: ICD-10-CM

## 2021-09-13 DIAGNOSIS — D84.9 IMMUNOSUPPRESSED STATUS (HCC): ICD-10-CM

## 2021-09-13 DIAGNOSIS — E44.1 MILD PROTEIN-CALORIE MALNUTRITION (HCC): ICD-10-CM

## 2021-09-13 PROCEDURE — 1036F TOBACCO NON-USER: CPT | Performed by: INTERNAL MEDICINE

## 2021-09-13 PROCEDURE — 99213 OFFICE O/P EST LOW 20 MIN: CPT | Performed by: INTERNAL MEDICINE

## 2021-09-13 NOTE — PATIENT INSTRUCTIONS
The patient is scheduled at [facility] for a colon/egd with Dr Levine on 10/15/2021  Miralax/dulcolax prep instructions have been gone over in the office, with the patient, by the MA  The patient is aware that they will receive a call with the arrival time the day prior to procedure and that they will need a  the day of the procedure   I have asked the patient to call with any questions that they might have prior to procedure

## 2021-09-13 NOTE — PROGRESS NOTES
[facility] - Outpatient Follow-up Note  Tiffani Sheriff 48 y o  female MRN: [id number]  Encounter: [id number]          ASSESSMENT AND PLAN:    Tiffani Sheriff is a 48 y o  female who presents with complaint of inflammatory bowel disease thought to be Crohn's versus indeterminate colitis versus ulcerative colitis, prior diagnosis of mast cell activation syndrome, IVIG for possible immunodeficiency who presents for follow-up  She notes that she does well for the 1st 3 weeks of this Stelara but she has symptoms the week prior to her injection  She has some right upper quadrant pain and started taking prednisone which has helped improve it  Her disease appears mild to moderately active  CMP normal   Vitamin-D and vitamin B12 normal   CBC normal     1  Crohn's disease of colon with complication ([facility] [address] )    2  Immunosuppressed status ([facility] [address] )    3  Mild protein-calorie malnutrition ([facility] [address] )    4  Diarrhea, unspecified type    5  Current chronic use of systemic steroids    6  Right upper quadrant abdominal pain    7  Inflammatory bowel disease        Orders Placed This Encounter   Procedures    MISCELLANEOUS LAB TEST    Colonoscopy    EGD     Continue Stelara every 4 weeks  Recheck a Stelara level the day before the next Stelara injection  Wean prednisone  Continue budesonide for now, per her request, but ideally wean off in the future  Continue fluid hydration  I encouraged her to speak with Dr Gay Johnson because she did not fully understand why he questioned the diagnosis of the corn allergy in light of both prior testing and current testing  ______________________________________________________________________    SUBJECTIVE:    Tiffani Sheriff is a 48 y o  female who presents with complaint of IBD    Overall she is doing good but hesitates to say that  She enjoyed much of summer  She struggles the last week of the Stelara  Everytime she is off the prednisone things flare 1 month later   The pain and inflammation get worse and she ends up back on the prednisone  She feels like she is always behind and trying to catch up    + bloating  Last week no formed stools  It was uncontrollable  + accidents  Multiple stools per day  The morning is always the worse  If she is up and moving the flood [place] open  She did her stelara yesterday and by this morning she had formed stools, think but formed  It smells less like colitis  No blood  She had some RUQ pain but after starting prednisone at 40mg it calmed down  She is now down to 30mg  She remains on the budesonide  She does not want to stop the budesonide because if she misses even one dose she gets sick immediately  She stopped the buspirone given it's side effects  She is frustrated with her diagnosis and ongoing symptoms, given the symptoms are still active  REVIEW OF SYSTEMS IS OTHERWISE NEGATIVE  10 point ROS reviewed and negative, except as above      Historical Information   Past Medical History:   Diagnosis Date    Anxiety     Asthma     Chronic kidney disease     Deep vein thrombosis (HCC)     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    Generalized anxiety disorder 8/26/2017    Heart murmur     Migraine     Myocardial infarction [facility])     NSTEMI  pt denies, documented in cardio note    Pancreatitis     sphinger of     Psychiatric disorder     RA (rhe[facility]oid arthritis) ([facility] [address] [facility]     Ulcerative colitis ([facility] [address] )      Past Surgical History:   Procedure Laterality Date    APPENDECTOMY      CHOLECYSTECTOMY      EGD AND COLONOSCOPY N/A 9/12/2017    Procedure: EGD AND COLONOSCOPY;  Surgeon: Sofya Garcia MD;  [facility]; Service: Gastroenterology    ERCP N/A 9/15/2017    Procedure: ENDOSCOPIC RETROGRADE CHOLANGIOPANCREATOGRAPHY (ERCP); Surgeon: Sheryle Congress, MD;  [facility];   Service: Gastroenterology    HYSTERECTOMY      KNEE SURGERY Right     LAPAROSCOPIC ENDOMETRIOSIS FULGURATION      ORIF ANKLE FRACTURE Left     SC KNEE SCOPE,MED/LAT MENISECTOMY Left 5/12/2017    Procedure: POSSIBLE MEDIAL MENISECTOMY;  Surgeon: Nigel Vega MD;  [facility]: [facility];  Service: Orthopedics    [address] [place] [address] Left 5/12/2017    Procedure: KNEE ARTHROSCOPY EVALUATION, CHONDROPLASTY;  Surgeon: Nigel Vega MD;  [facility];  Service: Orthopedics    SC LAP,DIAGNOSTIC ABDOMEN N/A 12/12/2017    Procedure: LAPAROSCOPY DIAGNOSTIC  WITH LYSIS OF ADHESIONS;  Surgeon: Emiliano Jerez DO;  Location:  MAIN OR;  Service: General     Social History   Social History     Substance and Sexual Activity   Alcohol Use Never     Social History     Substance and Sexual Activity   Drug Use Not Currently     Social History     Tobacco Use   Smoking Status Never Smoker   Smokeless Tobacco Never Used     Family History   Problem Relation Age of Onset    Ulcerative colitis Mother     Heart failure Father     Neuropathy Father     Macular degeneration Father     Diabetes Father     Asthma Daughter     Hypothyroidism Daughter     Heart disease Maternal Grandmother     Arthritis Maternal Grandmother     Arthritis Paternal Grandmother     Macular degeneration Paternal Grandmother     Lymphoma Paternal Grandfather     Heart failure Paternal Aunt     Heart failure Paternal [de-identified]     Breast cancer Paternal Aunt 53    Breast cancer additional onset Paternal Aunt 62    Hypothyroidism Paternal Aunt     Breast cancer Maternal Aunt     No Known Problems Maternal Grandfather        Meds/Allergies       Current Outpatient Medications:     acetaminophen (TYLENOL) 500 mg tablet    budesonide (ENTOCORT EC) 3 MG capsule    busPIRone (BUSPAR) 5 mg tablet    butalbital-acetaminophen-caffeine (FIORICET,ESGIC) -40 mg per tablet    cetirizine (ZyrTEC) 10 mg tablet    cholecalciferol (VITAMIN D) 400 units/mL    cromolyn (GASTROCROM) 100 MG/5ML solution    cyanocobalamin 1,000 mcg/mL    diphenhydrAMINE (BENADRYL) 25 mg tablet   EPINEPHrine (EPIPEN 2-MARYSOL IJ)    ergocalciferol (VITAMIN D2) 50,000 units    ergocalciferol (VITAMIN D2) 50,000 units    hydrOXYzine HCL (ATARAX) 25 mg tablet    levalbuterol (XOPENEX HFA) 45 mcg/act inhaler    levothyroxine 50 mcg tablet    liothyronine (CYTOMEL) 5 mcg tablet    LORazepam (ATIVAN) 1 mg tablet    montelukast (SINGULAIR) 10 mg tablet    NON FORMULARY    omalizumab (XOLAIR) 150 mg    pantoprazole (PROTONIX) 40 mg tablet    polyethylene glycol (GLYCOLAX) 17 GM/SCOOP powder    predniSONE 20 mg tablet    Stelara 90 MG/ML subcutaneous injection    temazepam (RESTORIL) 30 mg capsule    ustekinumab (Stelara) 90 mg/mL subcutaneous injection    calcium citrate (CALCITRATE) 950 (200 Ca) MG tablet    diclofenac (VOLTAREN) 75 mg EC tablet    Riboflavin (Vitamin B-2) 100 MG TABS    Current Facility-Administered Medications:     cyanocobalamin injection 1,000 mcg, 1,000 mcg, Intramuscular, Q30 Days, 1,000 mcg at 10/25/19 1204    Allergies   Allergen Reactions    Amitriptyline Anaphylaxis     According to the patient she had nphylaxis    Aspergillus Fumigatus Other (See Comments), Hives and Swelling    Azathioprine Other (See Comments) and Anaphylaxis     pancreatitis  According to patient she needed Epipen    Buspar [Buspirone] Hives and Shortness Of Breath    Corn Dextrin Anaphylaxis     Any corn based products    Eggs Or Egg-Derived Products - Food Allergy Anaphylaxis    Gelatin - Food Allergy Anaphylaxis    Malto Dextrin [Dextrin] Anaphylaxis    Other Anaphylaxis     Gel caps, maltodextrose, dextrose,grapes    Penicillium Notatum Shortness Of Breath and Swelling    Sumatriptan Anaphylaxis     Bradycardia, sob, back pressure, head pain,throat tightness  According to the patient she had anphylaxis    Contrast [Iodinated Diagnostic Agents] Other (See Comments)     Pt states needs to be premedicated prior to injection    Roberto-Silvino - Food Allergy - Food Allergy Hives    Humira [Adalimumab] Other (See Comments)     "I develop drug induced lupus"    Ibuprofen Hives and Throat Swelling    Lidocaine Other (See Comments)     Pt denies allergy 07/02/2021    Remicade [Infliximab] Other (See Comments)     "I develop drug induced lupus"    Sulfa Antibiotics Hives and Other (See Comments)    Sulfate Hives    Sulfites - Food Allergy Hives    Chlorhexidine Itching    Histamine Hives, Rash and Other (See Comments)     Intolerance             Objective     Blood pressure 124/70, pulse 78, temperature 98 3 °F (36 8 °C), weight 70 4 kg (155 lb 3 2 oz), not currently breastfeeding  Body mass index is 25 63 kg/m²  PHYSICAL EXAMINATION:    General Appearance:   Alert, cooperative, no distress   HEENT:  Normocephalic, atraumatic, anicteric  Neck supple, symmetrical, trachea midline  Lungs:   Equal chest rise and unlabored breathing, normal effort, no coughing  Cardiovascular:   No visualized JVD  Abdomen:   No abdominal distension  Skin:   No jaundice, rashes, or lesions  Musculoskeletal:   Normal range of motion visualized  Psych:  Normal affect and normal insight  Neuro:  Alert and appropriate  Lab Results:   No visits with results within 1 Day(s) from this visit     Latest known visit with results is:   Appointment on 08/18/2021   Component Date Value    Vitamin K 08/18/2021 0 06*    WBC 08/18/2021 7 45     RBC 08/18/2021 4 76     Hemoglobin 08/18/2021 14 2     Hematocrit 08/18/2021 43 0     MCV 08/18/2021 90     MCH 08/18/2021 29 8     MCHC 08/18/2021 33 0     RDW 08/18/2021 13 2     MPV 08/18/2021 8 6*    Platelets 57/58/3275 406*    nRBC 08/18/2021 0     Neutrophils Relative 08/18/2021 50     Immat GRANS % 08/18/2021 0     Lymphocytes Relative 08/18/2021 35     Monocytes Relative 08/18/2021 10     Eosinophils Relative 08/18/2021 3     Basophils Relative 08/18/2021 2*    Neutrophils Absolute 08/18/2021 3 74     Immature Grans Absolute 08/18/2021 0 01     Lymphocytes Absolute 08/18/2021 2 63     Monocytes Absolute 08/18/2021 0 75     Eosinophils Absolute 08/18/2021 0 20     Basophils Absolute 08/18/2021 0 12*    Sodium 08/18/2021 141     Potassium 08/18/2021 3 5     Chloride 08/18/2021 105     CO2 08/18/2021 23     ANION GAP 08/18/2021 13     BUN 08/18/2021 12     Creatinine 08/18/2021 0 74     Glucose 08/18/2021 95     Calcium 08/18/2021 8 7     AST 08/18/2021 15     ALT 08/18/2021 24     Alkaline Phosphatase 08/18/2021 61     Total Protein 08/18/2021 8 0     Albumin 08/18/2021 4 0     Total Bilirubin 08/18/2021 0 53     eGFR 08/18/2021 96     Vitamin B-12 08/18/2021 667     Vit D, 25-Hydroxy 08/18/2021 54 5     Thyroglobulin Ab 08/18/2021 1 2*    Corn Cultivated 08/18/2021 <0 10     A  ALTERNATA 08/18/2021 <0 10     A  FUMIGATUS 08/18/2021 <0 10     Berm[place] Grass 08/18/2021 <0 10     Box Elder  08/18/2021 <0 10     Cat Epithellium-Dander 08/18/2021 <0 10     C  HERBARUM 08/18/2021 <0 10     Cockroach 08/18/2021 <0 10     Common Silver [place] 08/18/2021 <0 10     [place] 08/18/2021 <0 10     D  farinae 08/18/2021 <0 10     D  pteronyssinus 08/18/2021 <0 10     Dog Dander 08/18/2021 <0 10     [place] IgE 08/18/2021 <0 10    [address] Tree 08/18/2021 <0 10     Mugwort 08/18/2021 <0 10     [place] Tree 08/18/2021 <0 10     Oak 08/18/2021 <0 10     P CHRYSOGENUM 08/18/2021 <0 10     Rough Pigweed  IgE 08/18/2021 <0 10     Common Ragweed 08/18/2021 <0 10     Sheep [place] IgE 08/18/2021 <0 10     [place] Tree 08/18/2021 <0 10     [place] Grass 08/18/2021 <0 10     [place] Tree 08/18/2021 <0 10     [place] Tree 08/18/2021 <0 10     IgE 08/18/2021 43 8     Allergen Comment 08/18/2021 See Below     MOUSE URINE 08/18/2021 <0 10     THYROGLOBULIN (TG-RUI) 08/18/2021 15        Lab Results   Component Value Date    WBC 7 45 08/18/2021    HGB 14 2 08/18/2021    HCT 43 0 08/18/2021    MCV 90 08/18/2021     (H) 08/18/2021       Lab Results   Component Value Date     01/16/2015    SODIUM 141 08/18/2021    K 3 5 08/18/2021     08/18/2021    CO2 23 08/18/2021    ANIONGAP 12 01/16/2015    AGAP 13 08/18/2021    BUN 12 08/18/2021    CREATININE 0 74 08/18/2021    GLUC 95 08/18/2021    GLUF 101 (H) 08/05/2020    CALCIUM 8 7 08/18/2021    AST 15 08/18/2021    ALT 24 08/18/2021    ALKPHOS 61 08/18/2021    PROT 7 7 01/16/2015    TP 8 0 08/18/2021    BILITOT 0 7 01/16/2015    TBILI 0 53 08/18/2021    EGFR 96 08/18/2021       Lab Results   Component Value Date    CRP <0 5 05/25/2021       Lab Results   Component Value Date    [id number] 0 463 02/22/2021       Lab Results   Component Value Date    IRON 190 (H) 11/01/2019    TIBC 355 11/01/2019    FERRITIN 42 11/09/2020       Radiology Results:   No results found  assist to go to bathroom

## 2021-09-16 ENCOUNTER — HOSPITAL ENCOUNTER (OUTPATIENT)
Dept: INFUSION CENTER | Facility: CLINIC | Age: 46
Discharge: HOME | End: 2021-09-16
Payer: COMMERCIAL

## 2021-09-16 DIAGNOSIS — E44.1 MILD PROTEIN-CALORIE MALNUTRITION (HCC): ICD-10-CM

## 2021-09-16 DIAGNOSIS — R19.7 DIARRHEA, UNSPECIFIED TYPE: ICD-10-CM

## 2021-09-16 DIAGNOSIS — R63.0 ANOREXIA: Primary | ICD-10-CM

## 2021-09-16 PROCEDURE — 96360 HYDRATION IV INFUSION INIT: CPT

## 2021-09-16 PROCEDURE — 96361 HYDRATE IV INFUSION ADD-ON: CPT

## 2021-09-16 RX ORDER — SODIUM CHLORIDE, SODIUM LACTATE, POTASSIUM CHLORIDE, CALCIUM CHLORIDE 600; 310; 30; 20 MG/100ML; MG/100ML; MG/100ML; MG/100ML
1000 INJECTION, SOLUTION INTRAVENOUS ONCE
Status: COMPLETED | OUTPATIENT
Start: 2021-09-16 | End: 2021-09-16

## 2021-09-16 RX ORDER — SODIUM CHLORIDE, SODIUM LACTATE, POTASSIUM CHLORIDE, CALCIUM CHLORIDE 600; 310; 30; 20 MG/100ML; MG/100ML; MG/100ML; MG/100ML
1000 INJECTION, SOLUTION INTRAVENOUS ONCE
Status: CANCELLED
Start: 2021-09-23 | End: 2021-09-23

## 2021-09-16 RX ADMIN — SODIUM CHLORIDE, SODIUM LACTATE, POTASSIUM CHLORIDE, AND CALCIUM CHLORIDE 1000 ML: .6; .31; .03; .02 INJECTION, SOLUTION INTRAVENOUS at 10:01

## 2021-09-16 NOTE — PROGRESS NOTES
Patient tolerated ordered hydration  Declined AVS, aware of next appointment, left infusion center in baseline condition

## 2021-09-20 ENCOUNTER — DOCTOR'S OFFICE (OUTPATIENT)
Dept: URBAN - NONMETROPOLITAN AREA CLINIC 1 | Facility: CLINIC | Age: 46
Setting detail: OPHTHALMOLOGY
End: 2021-09-20
Payer: COMMERCIAL

## 2021-09-20 ENCOUNTER — RX ONLY (RX ONLY)
Age: 46
End: 2021-09-20

## 2021-09-20 VITALS — HEIGHT: 55 IN

## 2021-09-20 DIAGNOSIS — H43.813: ICD-10-CM

## 2021-09-20 DIAGNOSIS — H40.003: ICD-10-CM

## 2021-09-20 DIAGNOSIS — H46.8: ICD-10-CM

## 2021-09-20 PROCEDURE — 92201 OPSCPY EXTND RTA DRAW UNI/BI: CPT | Performed by: OPHTHALMOLOGY

## 2021-09-20 PROCEDURE — 92134 CPTRZ OPH DX IMG PST SGM RTA: CPT | Performed by: OPHTHALMOLOGY

## 2021-09-20 PROCEDURE — 92014 COMPRE OPH EXAM EST PT 1/>: CPT | Performed by: OPHTHALMOLOGY

## 2021-09-20 ASSESSMENT — CONFRONTATIONAL VISUAL FIELD TEST (CVF)
OS_FINDINGS: FULL
OD_FINDINGS: FULL

## 2021-09-20 ASSESSMENT — VISUAL ACUITY
OD_BCVA: 20/25-1
OS_BCVA: 20/20-1

## 2021-09-23 ENCOUNTER — HOSPITAL ENCOUNTER (OUTPATIENT)
Dept: INFUSION CENTER | Facility: CLINIC | Age: 46
Discharge: HOME | End: 2021-09-23
Payer: COMMERCIAL

## 2021-09-23 VITALS
SYSTOLIC BLOOD PRESSURE: 118 MMHG | RESPIRATION RATE: 18 BRPM | HEART RATE: 85 BPM | DIASTOLIC BLOOD PRESSURE: 87 MMHG | TEMPERATURE: 98.2 F

## 2021-09-23 DIAGNOSIS — E44.1 MILD PROTEIN-CALORIE MALNUTRITION (HCC): ICD-10-CM

## 2021-09-23 DIAGNOSIS — R63.0 ANOREXIA: Primary | ICD-10-CM

## 2021-09-23 DIAGNOSIS — R19.7 DIARRHEA, UNSPECIFIED TYPE: ICD-10-CM

## 2021-09-23 PROCEDURE — 96361 HYDRATE IV INFUSION ADD-ON: CPT

## 2021-09-23 PROCEDURE — 96360 HYDRATION IV INFUSION INIT: CPT

## 2021-09-23 RX ORDER — SODIUM CHLORIDE, SODIUM LACTATE, POTASSIUM CHLORIDE, CALCIUM CHLORIDE 600; 310; 30; 20 MG/100ML; MG/100ML; MG/100ML; MG/100ML
1000 INJECTION, SOLUTION INTRAVENOUS ONCE
Status: CANCELLED
Start: 2021-09-30 | End: 2021-09-30

## 2021-09-23 RX ORDER — SODIUM CHLORIDE, SODIUM LACTATE, POTASSIUM CHLORIDE, CALCIUM CHLORIDE 600; 310; 30; 20 MG/100ML; MG/100ML; MG/100ML; MG/100ML
1000 INJECTION, SOLUTION INTRAVENOUS ONCE
Status: COMPLETED | OUTPATIENT
Start: 2021-09-23 | End: 2021-09-23

## 2021-09-23 RX ADMIN — SODIUM CHLORIDE, SODIUM LACTATE, POTASSIUM CHLORIDE, AND CALCIUM CHLORIDE 1000 ML: .6; .31; .03; .02 INJECTION, SOLUTION INTRAVENOUS at 14:01

## 2021-09-23 NOTE — PROGRESS NOTES
Pt tolerated hydration without incident  Pt declined AVS but did make future appt before leaving infusion center

## 2021-09-30 ENCOUNTER — HOSPITAL ENCOUNTER (OUTPATIENT)
Dept: INFUSION CENTER | Facility: CLINIC | Age: 46
Discharge: HOME | End: 2021-09-30
Payer: COMMERCIAL

## 2021-09-30 VITALS — TEMPERATURE: 97.9 F | HEART RATE: 94 BPM

## 2021-09-30 DIAGNOSIS — R63.0 ANOREXIA: Primary | ICD-10-CM

## 2021-09-30 DIAGNOSIS — E44.1 MILD PROTEIN-CALORIE MALNUTRITION (HCC): ICD-10-CM

## 2021-09-30 DIAGNOSIS — R19.7 DIARRHEA, UNSPECIFIED TYPE: ICD-10-CM

## 2021-09-30 PROCEDURE — 96361 HYDRATE IV INFUSION ADD-ON: CPT

## 2021-09-30 PROCEDURE — 96360 HYDRATION IV INFUSION INIT: CPT

## 2021-09-30 RX ORDER — SODIUM CHLORIDE, SODIUM LACTATE, POTASSIUM CHLORIDE, CALCIUM CHLORIDE 600; 310; 30; 20 MG/100ML; MG/100ML; MG/100ML; MG/100ML
1000 INJECTION, SOLUTION INTRAVENOUS ONCE
Status: CANCELLED
Start: 2021-10-07 | End: 2021-10-07

## 2021-09-30 RX ORDER — SODIUM CHLORIDE, SODIUM LACTATE, POTASSIUM CHLORIDE, CALCIUM CHLORIDE 600; 310; 30; 20 MG/100ML; MG/100ML; MG/100ML; MG/100ML
1000 INJECTION, SOLUTION INTRAVENOUS ONCE
Status: COMPLETED | OUTPATIENT
Start: 2021-09-30 | End: 2021-09-30

## 2021-09-30 RX ADMIN — SODIUM CHLORIDE, SODIUM LACTATE, POTASSIUM CHLORIDE, AND CALCIUM CHLORIDE 1000 ML: .6; .31; .03; .02 INJECTION, SOLUTION INTRAVENOUS at 13:32

## 2021-10-04 ENCOUNTER — TELEPHONE (OUTPATIENT)
Dept: GASTROENTEROLOGY | Facility: CLINIC | Age: 46
End: 2021-10-04

## 2021-10-05 ENCOUNTER — APPOINTMENT (OUTPATIENT)
Dept: LAB | Facility: HOSPITAL | Age: 46
End: 2021-10-05
Attending: INTERNAL MEDICINE
Payer: COMMERCIAL

## 2021-10-05 DIAGNOSIS — E03.2 IATROGENIC HYPOTHYROIDISM: ICD-10-CM

## 2021-10-05 DIAGNOSIS — D84.9 IMMUNOSUPPRESSED STATUS (HCC): ICD-10-CM

## 2021-10-05 DIAGNOSIS — E05.90 THYROTOXICOSIS WITHOUT THYROID STORM, UNSPECIFIED THYROTOXICOSIS TYPE: ICD-10-CM

## 2021-10-05 DIAGNOSIS — K50.119 CROHN'S DISEASE OF COLON WITH COMPLICATION (HCC): ICD-10-CM

## 2021-10-05 DIAGNOSIS — R73.9 HYPERGLYCEMIA: ICD-10-CM

## 2021-10-05 LAB
25(OH)D3 SERPL-MCNC: 24.9 NG/ML (ref 30–100)
ALBUMIN SERPL BCP-MCNC: 3.5 G/DL (ref 3.5–5)
ALP SERPL-CCNC: 58 U/L (ref 46–116)
ALT SERPL W P-5'-P-CCNC: 30 U/L (ref 12–78)
ANION GAP SERPL CALCULATED.3IONS-SCNC: 9 MMOL/L (ref 4–13)
AST SERPL W P-5'-P-CCNC: 17 U/L (ref 5–45)
BASOPHILS # BLD AUTO: 0.12 THOUSANDS/ΜL (ref 0–0.1)
BASOPHILS NFR BLD AUTO: 2 % (ref 0–1)
BILIRUB SERPL-MCNC: 0.4 MG/DL (ref 0.2–1)
BUN SERPL-MCNC: 10 MG/DL (ref 5–25)
CALCIUM SERPL-MCNC: 8.7 MG/DL (ref 8.3–10.1)
CHLORIDE SERPL-SCNC: 101 MMOL/L (ref 100–108)
CO2 SERPL-SCNC: 24 MMOL/L (ref 21–32)
CREAT SERPL-MCNC: 0.79 MG/DL (ref 0.6–1.3)
EOSINOPHIL # BLD AUTO: 0.24 THOUSAND/ΜL (ref 0–0.61)
EOSINOPHIL NFR BLD AUTO: 4 % (ref 0–6)
ERYTHROCYTE [DISTWIDTH] IN BLOOD BY AUTOMATED COUNT: 13.8 % (ref 11.6–15.1)
GFR SERPL CREATININE-BSD FRML MDRD: 89 ML/MIN/1.73SQ M
GLUCOSE SERPL-MCNC: 110 MG/DL (ref 65–140)
HCT VFR BLD AUTO: 45.1 % (ref 34.8–46.1)
HGB BLD-MCNC: 14.3 G/DL (ref 11.5–15.4)
IMM GRANULOCYTES # BLD AUTO: 0.02 THOUSAND/UL (ref 0–0.2)
IMM GRANULOCYTES NFR BLD AUTO: 0 % (ref 0–2)
INSULIN SERPL-ACNC: 23.3 MU/L (ref 3–25)
LYMPHOCYTES # BLD AUTO: 2.03 THOUSANDS/ΜL (ref 0.6–4.47)
LYMPHOCYTES NFR BLD AUTO: 32 % (ref 14–44)
MAGNESIUM SERPL-MCNC: 2.3 MG/DL (ref 1.6–2.6)
MCH RBC QN AUTO: 29.7 PG (ref 26.8–34.3)
MCHC RBC AUTO-ENTMCNC: 31.7 G/DL (ref 31.4–37.4)
MCV RBC AUTO: 94 FL (ref 82–98)
MONOCYTES # BLD AUTO: 0.61 THOUSAND/ΜL (ref 0.17–1.22)
MONOCYTES NFR BLD AUTO: 10 % (ref 4–12)
NEUTROPHILS # BLD AUTO: 3.34 THOUSANDS/ΜL (ref 1.85–7.62)
NEUTS SEG NFR BLD AUTO: 52 % (ref 43–75)
NRBC BLD AUTO-RTO: 0 /100 WBCS
PLATELET # BLD AUTO: 400 THOUSANDS/UL (ref 149–390)
PMV BLD AUTO: 8.4 FL (ref 8.9–12.7)
POTASSIUM SERPL-SCNC: 3.8 MMOL/L (ref 3.5–5.3)
PROT SERPL-MCNC: 7.4 G/DL (ref 6.4–8.2)
PTH-INTACT SERPL-MCNC: 19.6 PG/ML (ref 18.4–80.1)
RBC # BLD AUTO: 4.81 MILLION/UL (ref 3.81–5.12)
SODIUM SERPL-SCNC: 134 MMOL/L (ref 136–145)
T3FREE SERPL-MCNC: 1.65 PG/ML (ref 2.3–4.2)
T4 FREE SERPL-MCNC: 0.88 NG/DL (ref 0.76–1.46)
TSH SERPL DL<=0.05 MIU/L-ACNC: 1.68 UIU/ML (ref 0.36–3.74)
WBC # BLD AUTO: 6.36 THOUSAND/UL (ref 4.31–10.16)

## 2021-10-05 PROCEDURE — 84481 FREE ASSAY (FT-3): CPT

## 2021-10-05 PROCEDURE — 36415 COLL VENOUS BLD VENIPUNCTURE: CPT

## 2021-10-05 PROCEDURE — 86337 INSULIN ANTIBODIES: CPT

## 2021-10-05 PROCEDURE — 80299 QUANTITATIVE ASSAY DRUG: CPT

## 2021-10-05 PROCEDURE — 82397 CHEMILUMINESCENT ASSAY: CPT

## 2021-10-05 PROCEDURE — 83525 ASSAY OF INSULIN: CPT

## 2021-10-05 PROCEDURE — 83970 ASSAY OF PARATHORMONE: CPT

## 2021-10-05 PROCEDURE — 84443 ASSAY THYROID STIM HORMONE: CPT

## 2021-10-05 PROCEDURE — 85025 COMPLETE CBC W/AUTO DIFF WBC: CPT

## 2021-10-05 PROCEDURE — 82306 VITAMIN D 25 HYDROXY: CPT

## 2021-10-05 PROCEDURE — 84439 ASSAY OF FREE THYROXINE: CPT

## 2021-10-05 PROCEDURE — 80053 COMPREHEN METABOLIC PANEL: CPT

## 2021-10-05 PROCEDURE — 83735 ASSAY OF MAGNESIUM: CPT

## 2021-10-07 ENCOUNTER — HOSPITAL ENCOUNTER (OUTPATIENT)
Dept: INFUSION CENTER | Facility: CLINIC | Age: 46
End: 2021-10-07

## 2021-10-08 ENCOUNTER — HOSPITAL ENCOUNTER (OUTPATIENT)
Dept: INFUSION CENTER | Facility: CLINIC | Age: 46
Discharge: HOME | End: 2021-10-08
Payer: COMMERCIAL

## 2021-10-08 VITALS
TEMPERATURE: 98.1 F | SYSTOLIC BLOOD PRESSURE: 122 MMHG | RESPIRATION RATE: 18 BRPM | DIASTOLIC BLOOD PRESSURE: 83 MMHG | HEART RATE: 94 BPM

## 2021-10-08 DIAGNOSIS — R63.0 ANOREXIA: Primary | ICD-10-CM

## 2021-10-08 DIAGNOSIS — R19.7 DIARRHEA, UNSPECIFIED TYPE: ICD-10-CM

## 2021-10-08 DIAGNOSIS — E44.1 MILD PROTEIN-CALORIE MALNUTRITION (HCC): ICD-10-CM

## 2021-10-08 PROCEDURE — 96360 HYDRATION IV INFUSION INIT: CPT

## 2021-10-08 PROCEDURE — 96361 HYDRATE IV INFUSION ADD-ON: CPT

## 2021-10-08 RX ORDER — SODIUM CHLORIDE, SODIUM LACTATE, POTASSIUM CHLORIDE, CALCIUM CHLORIDE 600; 310; 30; 20 MG/100ML; MG/100ML; MG/100ML; MG/100ML
1000 INJECTION, SOLUTION INTRAVENOUS ONCE
Status: COMPLETED | OUTPATIENT
Start: 2021-10-08 | End: 2021-10-08

## 2021-10-08 RX ORDER — SODIUM CHLORIDE, SODIUM LACTATE, POTASSIUM CHLORIDE, CALCIUM CHLORIDE 600; 310; 30; 20 MG/100ML; MG/100ML; MG/100ML; MG/100ML
1000 INJECTION, SOLUTION INTRAVENOUS ONCE
Status: CANCELLED
Start: 2021-10-14 | End: 2021-10-14

## 2021-10-08 RX ADMIN — SODIUM CHLORIDE, SODIUM LACTATE, POTASSIUM CHLORIDE, AND CALCIUM CHLORIDE 1000 ML: .6; .31; .03; .02 INJECTION, SOLUTION INTRAVENOUS at 12:01

## 2021-10-11 LAB — INSULIN AB SER-ACNC: <5 UU/ML

## 2021-10-14 ENCOUNTER — HOSPITAL ENCOUNTER (OUTPATIENT)
Dept: INFUSION CENTER | Facility: CLINIC | Age: 46
Discharge: HOME | End: 2021-10-14
Payer: COMMERCIAL

## 2021-10-14 ENCOUNTER — TELEPHONE (OUTPATIENT)
Dept: SURGERY | Facility: HOSPITAL | Age: 46
End: 2021-10-14

## 2021-10-14 VITALS
TEMPERATURE: 98.7 F | DIASTOLIC BLOOD PRESSURE: 81 MMHG | SYSTOLIC BLOOD PRESSURE: 141 MMHG | HEART RATE: 109 BPM | RESPIRATION RATE: 18 BRPM

## 2021-10-14 DIAGNOSIS — E44.1 MILD PROTEIN-CALORIE MALNUTRITION (HCC): ICD-10-CM

## 2021-10-14 DIAGNOSIS — R63.0 ANOREXIA: Primary | ICD-10-CM

## 2021-10-14 DIAGNOSIS — R19.7 DIARRHEA, UNSPECIFIED TYPE: ICD-10-CM

## 2021-10-14 PROCEDURE — 96360 HYDRATION IV INFUSION INIT: CPT

## 2021-10-14 PROCEDURE — 96361 HYDRATE IV INFUSION ADD-ON: CPT

## 2021-10-14 RX ORDER — SODIUM CHLORIDE, SODIUM LACTATE, POTASSIUM CHLORIDE, CALCIUM CHLORIDE 600; 310; 30; 20 MG/100ML; MG/100ML; MG/100ML; MG/100ML
1000 INJECTION, SOLUTION INTRAVENOUS ONCE
Status: COMPLETED | OUTPATIENT
Start: 2021-10-14 | End: 2021-10-14

## 2021-10-14 RX ORDER — SODIUM CHLORIDE, SODIUM LACTATE, POTASSIUM CHLORIDE, CALCIUM CHLORIDE 600; 310; 30; 20 MG/100ML; MG/100ML; MG/100ML; MG/100ML
1000 INJECTION, SOLUTION INTRAVENOUS ONCE
Status: CANCELLED
Start: 2021-10-22 | End: 2021-10-15

## 2021-10-14 RX ADMIN — SODIUM CHLORIDE, SODIUM LACTATE, POTASSIUM CHLORIDE, AND CALCIUM CHLORIDE 1000 ML: .6; .31; .03; .02 INJECTION, SOLUTION INTRAVENOUS at 10:25

## 2021-10-15 ENCOUNTER — ANESTHESIA EVENT (OUTPATIENT)
Dept: PERIOP | Facility: HOSPITAL | Age: 46
End: 2021-10-15

## 2021-10-15 ENCOUNTER — ANESTHESIA (OUTPATIENT)
Dept: PERIOP | Facility: HOSPITAL | Age: 46
End: 2021-10-15

## 2021-10-15 ENCOUNTER — HOSPITAL ENCOUNTER (OUTPATIENT)
Dept: PERIOP | Facility: HOSPITAL | Age: 46
Setting detail: OUTPATIENT SURGERY
Discharge: HOME | End: 2021-10-15
Attending: INTERNAL MEDICINE
Payer: COMMERCIAL

## 2021-10-15 VITALS
HEART RATE: 76 BPM | SYSTOLIC BLOOD PRESSURE: 108 MMHG | RESPIRATION RATE: 18 BRPM | TEMPERATURE: 97.9 F | OXYGEN SATURATION: 99 % | HEIGHT: 65 IN | DIASTOLIC BLOOD PRESSURE: 69 MMHG | BODY MASS INDEX: 25.83 KG/M2 | WEIGHT: 155 LBS

## 2021-10-15 DIAGNOSIS — R10.11 RIGHT UPPER QUADRANT ABDOMINAL PAIN: ICD-10-CM

## 2021-10-15 DIAGNOSIS — K50.119 CROHN'S DISEASE OF COLON WITH COMPLICATION (HCC): ICD-10-CM

## 2021-10-15 DIAGNOSIS — D84.9 IMMUNOSUPPRESSED STATUS (HCC): ICD-10-CM

## 2021-10-15 PROCEDURE — 43239 EGD BIOPSY SINGLE/MULTIPLE: CPT | Performed by: INTERNAL MEDICINE

## 2021-10-15 PROCEDURE — 88305 TISSUE EXAM BY PATHOLOGIST: CPT | Performed by: PATHOLOGY

## 2021-10-15 PROCEDURE — 45380 COLONOSCOPY AND BIOPSY: CPT | Performed by: INTERNAL MEDICINE

## 2021-10-15 RX ORDER — PROPOFOL 10 MG/ML
INJECTION, EMULSION INTRAVENOUS CONTINUOUS PRN
Status: DISCONTINUED | OUTPATIENT
Start: 2021-10-15 | End: 2021-10-15

## 2021-10-15 RX ORDER — PROPOFOL 10 MG/ML
INJECTION, EMULSION INTRAVENOUS AS NEEDED
Status: DISCONTINUED | OUTPATIENT
Start: 2021-10-15 | End: 2021-10-15

## 2021-10-15 RX ORDER — METHYLPREDNISOLONE SODIUM SUCCINATE 125 MG/2ML
60 INJECTION, POWDER, LYOPHILIZED, FOR SOLUTION INTRAMUSCULAR; INTRAVENOUS ONCE
Status: DISCONTINUED | OUTPATIENT
Start: 2021-10-15 | End: 2021-10-15

## 2021-10-15 RX ORDER — SODIUM CHLORIDE, SODIUM LACTATE, POTASSIUM CHLORIDE, CALCIUM CHLORIDE 600; 310; 30; 20 MG/100ML; MG/100ML; MG/100ML; MG/100ML
INJECTION, SOLUTION INTRAVENOUS CONTINUOUS PRN
Status: DISCONTINUED | OUTPATIENT
Start: 2021-10-15 | End: 2021-10-15

## 2021-10-15 RX ORDER — LIDOCAINE HYDROCHLORIDE 10 MG/ML
INJECTION, SOLUTION EPIDURAL; INFILTRATION; INTRACAUDAL; PERINEURAL AS NEEDED
Status: DISCONTINUED | OUTPATIENT
Start: 2021-10-15 | End: 2021-10-15

## 2021-10-15 RX ORDER — DEXAMETHASONE SODIUM PHOSPHATE 4 MG/ML
4 INJECTION, SOLUTION INTRA-ARTICULAR; INTRALESIONAL; INTRAMUSCULAR; INTRAVENOUS; SOFT TISSUE ONCE
Status: COMPLETED | OUTPATIENT
Start: 2021-10-15 | End: 2021-10-15

## 2021-10-15 RX ORDER — DIPHENHYDRAMINE HYDROCHLORIDE 50 MG/ML
50 INJECTION INTRAMUSCULAR; INTRAVENOUS ONCE
Status: DISCONTINUED | OUTPATIENT
Start: 2021-10-15 | End: 2021-10-15

## 2021-10-15 RX ADMIN — PROPOFOL 100 MG: 10 INJECTION, EMULSION INTRAVENOUS at 12:57

## 2021-10-15 RX ADMIN — DEXAMETHASONE SODIUM PHOSPHATE 4 MG: 4 INJECTION, SOLUTION INTRAMUSCULAR; INTRAVENOUS at 12:15

## 2021-10-15 RX ADMIN — FAMOTIDINE 20 MG: 10 INJECTION INTRAVENOUS at 12:16

## 2021-10-15 RX ADMIN — SODIUM CHLORIDE, SODIUM LACTATE, POTASSIUM CHLORIDE, AND CALCIUM CHLORIDE: .6; .31; .03; .02 INJECTION, SOLUTION INTRAVENOUS at 12:52

## 2021-10-15 RX ADMIN — DIPHENHYDRAMINE HYDROCHLORIDE 50 MG: 50 INJECTION, SOLUTION INTRAMUSCULAR; INTRAVENOUS at 12:13

## 2021-10-15 RX ADMIN — PROPOFOL 150 MCG/KG/MIN: 10 INJECTION, EMULSION INTRAVENOUS at 13:01

## 2021-10-15 RX ADMIN — PROPOFOL 100 MG: 10 INJECTION, EMULSION INTRAVENOUS at 12:58

## 2021-10-15 RX ADMIN — LIDOCAINE HYDROCHLORIDE 50 MG: 10 INJECTION, SOLUTION EPIDURAL; INFILTRATION; INTRACAUDAL; PERINEURAL at 12:57

## 2021-10-19 LAB
Lab: NORMAL
MISCELLANEOUS LAB TEST RESULT: NORMAL
STONE ANALYSIS-IMP: NORMAL

## 2021-10-22 ENCOUNTER — HOSPITAL ENCOUNTER (OUTPATIENT)
Dept: INFUSION CENTER | Facility: CLINIC | Age: 46
Discharge: HOME | End: 2021-10-22
Payer: MEDICARE

## 2021-10-22 VITALS
DIASTOLIC BLOOD PRESSURE: 89 MMHG | HEART RATE: 77 BPM | SYSTOLIC BLOOD PRESSURE: 116 MMHG | RESPIRATION RATE: 18 BRPM | TEMPERATURE: 98.7 F

## 2021-10-22 DIAGNOSIS — E44.1 MILD PROTEIN-CALORIE MALNUTRITION (HCC): ICD-10-CM

## 2021-10-22 DIAGNOSIS — R19.7 DIARRHEA, UNSPECIFIED TYPE: ICD-10-CM

## 2021-10-22 DIAGNOSIS — R63.0 ANOREXIA: Primary | ICD-10-CM

## 2021-10-22 PROCEDURE — 96360 HYDRATION IV INFUSION INIT: CPT

## 2021-10-22 PROCEDURE — 96361 HYDRATE IV INFUSION ADD-ON: CPT

## 2021-10-22 RX ORDER — SODIUM CHLORIDE, SODIUM LACTATE, POTASSIUM CHLORIDE, CALCIUM CHLORIDE 600; 310; 30; 20 MG/100ML; MG/100ML; MG/100ML; MG/100ML
1000 INJECTION, SOLUTION INTRAVENOUS ONCE
Status: CANCELLED
Start: 2021-10-29 | End: 2021-10-28

## 2021-10-22 RX ORDER — SODIUM CHLORIDE, SODIUM LACTATE, POTASSIUM CHLORIDE, CALCIUM CHLORIDE 600; 310; 30; 20 MG/100ML; MG/100ML; MG/100ML; MG/100ML
1000 INJECTION, SOLUTION INTRAVENOUS ONCE
Status: COMPLETED | OUTPATIENT
Start: 2021-10-22 | End: 2021-10-22

## 2021-10-22 RX ADMIN — SODIUM CHLORIDE, SODIUM LACTATE, POTASSIUM CHLORIDE, AND CALCIUM CHLORIDE 1000 ML: .6; .31; .03; .02 INJECTION, SOLUTION INTRAVENOUS at 13:35

## 2021-10-29 ENCOUNTER — HOSPITAL ENCOUNTER (OUTPATIENT)
Dept: INFUSION CENTER | Facility: CLINIC | Age: 46
End: 2021-10-29

## 2021-11-02 ENCOUNTER — HOSPITAL ENCOUNTER (OUTPATIENT)
Dept: INFUSION CENTER | Facility: CLINIC | Age: 46
Discharge: HOME | End: 2021-11-02
Payer: MEDICARE

## 2021-11-02 VITALS
TEMPERATURE: 98 F | HEART RATE: 65 BPM | RESPIRATION RATE: 18 BRPM | SYSTOLIC BLOOD PRESSURE: 125 MMHG | DIASTOLIC BLOOD PRESSURE: 86 MMHG

## 2021-11-02 DIAGNOSIS — E44.1 MILD PROTEIN-CALORIE MALNUTRITION (HCC): ICD-10-CM

## 2021-11-02 DIAGNOSIS — R19.7 DIARRHEA, UNSPECIFIED TYPE: ICD-10-CM

## 2021-11-02 DIAGNOSIS — R63.0 ANOREXIA: Primary | ICD-10-CM

## 2021-11-02 PROCEDURE — 96360 HYDRATION IV INFUSION INIT: CPT

## 2021-11-02 PROCEDURE — 96361 HYDRATE IV INFUSION ADD-ON: CPT

## 2021-11-02 RX ORDER — SODIUM CHLORIDE, SODIUM LACTATE, POTASSIUM CHLORIDE, CALCIUM CHLORIDE 600; 310; 30; 20 MG/100ML; MG/100ML; MG/100ML; MG/100ML
1000 INJECTION, SOLUTION INTRAVENOUS ONCE
Status: COMPLETED | OUTPATIENT
Start: 2021-11-02 | End: 2021-11-02

## 2021-11-02 RX ORDER — SODIUM CHLORIDE, SODIUM LACTATE, POTASSIUM CHLORIDE, CALCIUM CHLORIDE 600; 310; 30; 20 MG/100ML; MG/100ML; MG/100ML; MG/100ML
1000 INJECTION, SOLUTION INTRAVENOUS ONCE
Status: CANCELLED
Start: 2021-11-17 | End: 2021-11-05

## 2021-11-02 RX ADMIN — SODIUM CHLORIDE, SODIUM LACTATE, POTASSIUM CHLORIDE, AND CALCIUM CHLORIDE 1000 ML: .6; .31; .03; .02 INJECTION, SOLUTION INTRAVENOUS at 09:26

## 2021-11-16 ENCOUNTER — APPOINTMENT (OUTPATIENT)
Dept: LAB | Facility: HOSPITAL | Age: 46
End: 2021-11-16
Payer: COMMERCIAL

## 2021-11-16 DIAGNOSIS — R73.9 BLOOD GLUCOSE ELEVATED: ICD-10-CM

## 2021-11-16 DIAGNOSIS — M79.7 SCAPULOHUMERAL FIBROSITIS: ICD-10-CM

## 2021-11-16 DIAGNOSIS — R63.5 WEIGHT GAIN: ICD-10-CM

## 2021-11-16 LAB
MAGNESIUM SERPL-MCNC: 2.2 MG/DL (ref 1.6–2.6)
TSH SERPL DL<=0.05 MIU/L-ACNC: 4.07 UIU/ML (ref 0.36–3.74)

## 2021-11-16 PROCEDURE — 86430 RHEUMATOID FACTOR TEST QUAL: CPT

## 2021-11-16 PROCEDURE — 86200 CCP ANTIBODY: CPT

## 2021-11-16 PROCEDURE — 86038 ANTINUCLEAR ANTIBODIES: CPT

## 2021-11-16 PROCEDURE — 84443 ASSAY THYROID STIM HORMONE: CPT

## 2021-11-16 PROCEDURE — 86235 NUCLEAR ANTIGEN ANTIBODY: CPT

## 2021-11-16 PROCEDURE — 83735 ASSAY OF MAGNESIUM: CPT

## 2021-11-16 PROCEDURE — 36415 COLL VENOUS BLD VENIPUNCTURE: CPT

## 2021-11-16 PROCEDURE — 83525 ASSAY OF INSULIN: CPT

## 2021-11-17 ENCOUNTER — TELEPHONE (OUTPATIENT)
Dept: GASTROENTEROLOGY | Facility: AMBULARY SURGERY CENTER | Age: 46
End: 2021-11-17

## 2021-11-17 ENCOUNTER — APPOINTMENT (OUTPATIENT)
Dept: LAB | Facility: HOSPITAL | Age: 46
End: 2021-11-17
Payer: COMMERCIAL

## 2021-11-17 ENCOUNTER — HOSPITAL ENCOUNTER (OUTPATIENT)
Dept: INFUSION CENTER | Facility: CLINIC | Age: 46
Discharge: HOME | End: 2021-11-17
Payer: COMMERCIAL

## 2021-11-17 VITALS
TEMPERATURE: 97.2 F | DIASTOLIC BLOOD PRESSURE: 77 MMHG | SYSTOLIC BLOOD PRESSURE: 120 MMHG | RESPIRATION RATE: 18 BRPM | HEART RATE: 82 BPM

## 2021-11-17 DIAGNOSIS — M79.7 SCAPULOHUMERAL FIBROSITIS: ICD-10-CM

## 2021-11-17 DIAGNOSIS — E05.90 THYROTOXICOSIS WITHOUT THYROID STORM, UNSPECIFIED THYROTOXICOSIS TYPE: ICD-10-CM

## 2021-11-17 DIAGNOSIS — R19.7 DIARRHEA, UNSPECIFIED TYPE: Primary | ICD-10-CM

## 2021-11-17 DIAGNOSIS — R19.7 DIARRHEA, UNSPECIFIED TYPE: ICD-10-CM

## 2021-11-17 DIAGNOSIS — R63.0 ANOREXIA: Primary | ICD-10-CM

## 2021-11-17 DIAGNOSIS — E44.1 MILD PROTEIN-CALORIE MALNUTRITION (HCC): ICD-10-CM

## 2021-11-17 DIAGNOSIS — E03.2 HYPOTHYROIDISM DUE TO NON-MEDICATION EXOGENOUS SUBSTANCES: ICD-10-CM

## 2021-11-17 DIAGNOSIS — L50.9 URTICARIA, UNSPECIFIED: ICD-10-CM

## 2021-11-17 DIAGNOSIS — R73.9 BLOOD GLUCOSE ELEVATED: ICD-10-CM

## 2021-11-17 LAB
ALBUMIN SERPL BCP-MCNC: 3.8 G/DL (ref 3.5–5)
ALP SERPL-CCNC: 66 U/L (ref 46–116)
ALT SERPL W P-5'-P-CCNC: 34 U/L (ref 12–78)
ANION GAP SERPL CALCULATED.3IONS-SCNC: 11 MMOL/L (ref 4–13)
ANISOCYTOSIS BLD QL SMEAR: PRESENT
AST SERPL W P-5'-P-CCNC: 18 U/L (ref 5–45)
BASOPHILS # BLD MANUAL: 0 THOUSAND/UL (ref 0–0.1)
BASOPHILS NFR MAR MANUAL: 0 % (ref 0–1)
BILIRUB SERPL-MCNC: 0.15 MG/DL (ref 0.2–1)
BUN SERPL-MCNC: 16 MG/DL (ref 5–25)
CALCIUM SERPL-MCNC: 8.7 MG/DL (ref 8.3–10.1)
CCP AB SER IA-ACNC: 2.4
CHLORIDE SERPL-SCNC: 103 MMOL/L (ref 100–108)
CO2 SERPL-SCNC: 25 MMOL/L (ref 21–32)
CREAT SERPL-MCNC: 0.94 MG/DL (ref 0.6–1.3)
CRP SERPL QL: <0.5 MG/L
EOSINOPHIL # BLD MANUAL: 0 THOUSAND/UL (ref 0–0.4)
EOSINOPHIL NFR BLD MANUAL: 0 % (ref 0–6)
ERYTHROCYTE [DISTWIDTH] IN BLOOD BY AUTOMATED COUNT: 13.6 % (ref 11.6–15.1)
GFR SERPL CREATININE-BSD FRML MDRD: 72 ML/MIN/1.73SQ M
GLUCOSE SERPL-MCNC: 229 MG/DL (ref 65–140)
HCT VFR BLD AUTO: 42.2 % (ref 34.8–46.1)
HGB BLD-MCNC: 14 G/DL (ref 11.5–15.4)
INSULIN SERPL-ACNC: 34.5 MU/L (ref 3–25)
LYMPHOCYTES # BLD AUTO: 0.5 THOUSAND/UL (ref 0.6–4.47)
LYMPHOCYTES # BLD AUTO: 5 % (ref 14–44)
MCH RBC QN AUTO: 29.8 PG (ref 26.8–34.3)
MCHC RBC AUTO-ENTMCNC: 33.2 G/DL (ref 31.4–37.4)
MCV RBC AUTO: 90 FL (ref 82–98)
MONOCYTES # BLD AUTO: 0.1 THOUSAND/UL (ref 0–1.22)
MONOCYTES NFR BLD: 1 % (ref 4–12)
NEUTROPHILS # BLD MANUAL: 9.39 THOUSAND/UL (ref 1.85–7.62)
NEUTS BAND NFR BLD MANUAL: 2 % (ref 0–8)
NEUTS SEG NFR BLD AUTO: 92 % (ref 43–75)
PLATELET # BLD AUTO: 461 THOUSANDS/UL (ref 149–390)
PLATELET BLD QL SMEAR: ABNORMAL
PMV BLD AUTO: 9 FL (ref 8.9–12.7)
POTASSIUM SERPL-SCNC: 3.7 MMOL/L (ref 3.5–5.3)
PROT SERPL-MCNC: 7.8 G/DL (ref 6.4–8.2)
RBC # BLD AUTO: 4.7 MILLION/UL (ref 3.81–5.12)
RHEUMATOID FACT SER QL LA: NEGATIVE
SODIUM SERPL-SCNC: 139 MMOL/L (ref 136–145)
STOMATOCYTES BLD QL SMEAR: PRESENT
WBC # BLD AUTO: 9.99 THOUSAND/UL (ref 4.31–10.16)

## 2021-11-17 PROCEDURE — 96361 HYDRATE IV INFUSION ADD-ON: CPT

## 2021-11-17 PROCEDURE — 86235 NUCLEAR ANTIGEN ANTIBODY: CPT

## 2021-11-17 PROCEDURE — 82306 VITAMIN D 25 HYDROXY: CPT

## 2021-11-17 PROCEDURE — 83520 IMMUNOASSAY QUANT NOS NONAB: CPT

## 2021-11-17 PROCEDURE — 84439 ASSAY OF FREE THYROXINE: CPT

## 2021-11-17 PROCEDURE — 36415 COLL VENOUS BLD VENIPUNCTURE: CPT

## 2021-11-17 PROCEDURE — 80053 COMPREHEN METABOLIC PANEL: CPT

## 2021-11-17 PROCEDURE — 84481 FREE ASSAY (FT-3): CPT

## 2021-11-17 PROCEDURE — 96360 HYDRATION IV INFUSION INIT: CPT

## 2021-11-17 PROCEDURE — 86140 C-REACTIVE PROTEIN: CPT

## 2021-11-17 PROCEDURE — 85027 COMPLETE CBC AUTOMATED: CPT

## 2021-11-17 PROCEDURE — 86225 DNA ANTIBODY NATIVE: CPT

## 2021-11-17 PROCEDURE — 86162 COMPLEMENT TOTAL (CH50): CPT

## 2021-11-17 PROCEDURE — 86337 INSULIN ANTIBODIES: CPT

## 2021-11-17 PROCEDURE — 85007 BL SMEAR W/DIFF WBC COUNT: CPT

## 2021-11-17 PROCEDURE — 83970 ASSAY OF PARATHORMONE: CPT

## 2021-11-17 PROCEDURE — 86160 COMPLEMENT ANTIGEN: CPT

## 2021-11-17 RX ORDER — SODIUM CHLORIDE, SODIUM LACTATE, POTASSIUM CHLORIDE, CALCIUM CHLORIDE 600; 310; 30; 20 MG/100ML; MG/100ML; MG/100ML; MG/100ML
1000 INJECTION, SOLUTION INTRAVENOUS ONCE
Status: COMPLETED | OUTPATIENT
Start: 2021-11-17 | End: 2021-11-17

## 2021-11-17 RX ORDER — SODIUM CHLORIDE, SODIUM LACTATE, POTASSIUM CHLORIDE, CALCIUM CHLORIDE 600; 310; 30; 20 MG/100ML; MG/100ML; MG/100ML; MG/100ML
1000 INJECTION, SOLUTION INTRAVENOUS ONCE
Status: CANCELLED
Start: 2021-11-24 | End: 2021-11-23

## 2021-11-17 RX ADMIN — SODIUM CHLORIDE, SODIUM LACTATE, POTASSIUM CHLORIDE, AND CALCIUM CHLORIDE 1000 ML: .6; .31; .03; .02 INJECTION, SOLUTION INTRAVENOUS at 12:15

## 2021-11-18 LAB
25(OH)D3 SERPL-MCNC: 46.1 NG/ML (ref 30–100)
C3 SERPL-MCNC: 128 MG/DL (ref 90–180)
C4 SERPL-MCNC: 26 MG/DL (ref 10–40)
ENA RNP AB SER-ACNC: 3.4 AI (ref 0–0.9)
ENA SM AB SER-ACNC: <0.2 AI (ref 0–0.9)
PTH-INTACT SERPL-MCNC: 77.8 PG/ML (ref 18.4–80.1)
T3FREE SERPL-MCNC: 3.54 PG/ML (ref 2.3–4.2)
T4 FREE SERPL-MCNC: 0.93 NG/DL (ref 0.76–1.46)

## 2021-11-19 LAB
CENTROMERE B AB SER-ACNC: <0.2 AI (ref 0–0.9)
CH50 SERPL-ACNC: >60 U/ML
DSDNA AB SER-ACNC: 1 IU/ML (ref 0–9)
ENA SCL70 AB SER-ACNC: <0.2 AI (ref 0–0.9)
ENA SS-A AB SER-ACNC: <0.2 AI (ref 0–0.9)
ENA SS-B AB SER-ACNC: <0.2 AI (ref 0–0.9)
RYE IGE QN: NEGATIVE

## 2021-11-20 LAB — TRYPTASE SERPL-MCNC: 9.9 UG/L (ref 2.2–13.2)

## 2021-11-22 DIAGNOSIS — R11.0 NAUSEA: Primary | ICD-10-CM

## 2021-11-22 RX ORDER — ONDANSETRON 4 MG/1
4 TABLET, ORALLY DISINTEGRATING ORAL EVERY 6 HOURS PRN
Qty: 20 TABLET | Refills: 0 | Status: SHIPPED | OUTPATIENT
Start: 2021-11-22 | End: 2021-12-09 | Stop reason: SDUPTHER

## 2021-11-23 DIAGNOSIS — R14.0 BLOATING: Primary | ICD-10-CM

## 2021-11-23 DIAGNOSIS — R19.7 DIARRHEA, UNSPECIFIED TYPE: Primary | ICD-10-CM

## 2021-11-23 LAB — HISTONE IGG SER IA-ACNC: 1 UNITS (ref 0–0.9)

## 2021-11-24 ENCOUNTER — HOSPITAL ENCOUNTER (OUTPATIENT)
Dept: INFUSION CENTER | Facility: CLINIC | Age: 46
Discharge: HOME | End: 2021-11-24
Payer: COMMERCIAL

## 2021-11-24 DIAGNOSIS — R19.7 DIARRHEA, UNSPECIFIED TYPE: ICD-10-CM

## 2021-11-24 DIAGNOSIS — E44.1 MILD PROTEIN-CALORIE MALNUTRITION (HCC): ICD-10-CM

## 2021-11-24 DIAGNOSIS — R63.0 ANOREXIA: Primary | ICD-10-CM

## 2021-11-24 LAB — INSULIN AB SER-ACNC: <5 UU/ML

## 2021-11-24 PROCEDURE — 96361 HYDRATE IV INFUSION ADD-ON: CPT

## 2021-11-24 PROCEDURE — 96360 HYDRATION IV INFUSION INIT: CPT

## 2021-11-24 RX ORDER — SIMETHICONE 125 MG
125 TABLET,CHEWABLE ORAL EVERY 6 HOURS PRN
COMMUNITY

## 2021-11-24 RX ORDER — SODIUM CHLORIDE, SODIUM LACTATE, POTASSIUM CHLORIDE, CALCIUM CHLORIDE 600; 310; 30; 20 MG/100ML; MG/100ML; MG/100ML; MG/100ML
1000 INJECTION, SOLUTION INTRAVENOUS ONCE
Status: CANCELLED
Start: 2021-12-02 | End: 2021-12-01

## 2021-11-24 RX ORDER — SODIUM CHLORIDE, SODIUM LACTATE, POTASSIUM CHLORIDE, CALCIUM CHLORIDE 600; 310; 30; 20 MG/100ML; MG/100ML; MG/100ML; MG/100ML
1000 INJECTION, SOLUTION INTRAVENOUS ONCE
Status: COMPLETED | OUTPATIENT
Start: 2021-11-24 | End: 2021-11-24

## 2021-11-24 RX ADMIN — SODIUM CHLORIDE, SODIUM LACTATE, POTASSIUM CHLORIDE, AND CALCIUM CHLORIDE 1000 ML: .6; .31; .03; .02 INJECTION, SOLUTION INTRAVENOUS at 14:20

## 2021-11-26 ENCOUNTER — TELEPHONE (OUTPATIENT)
Dept: GASTROENTEROLOGY | Facility: CLINIC | Age: 46
End: 2021-11-26

## 2021-11-26 ENCOUNTER — HOSPITAL ENCOUNTER (OUTPATIENT)
Dept: ULTRASOUND IMAGING | Facility: HOSPITAL | Age: 46
Discharge: HOME | End: 2021-11-26
Attending: INTERNAL MEDICINE
Payer: COMMERCIAL

## 2021-11-26 ENCOUNTER — HOSPITAL ENCOUNTER (EMERGENCY)
Facility: HOSPITAL | Age: 46
Discharge: HOME | End: 2021-11-26
Attending: EMERGENCY MEDICINE | Admitting: EMERGENCY MEDICINE
Payer: COMMERCIAL

## 2021-11-26 ENCOUNTER — LAB (OUTPATIENT)
Dept: LAB | Facility: HOSPITAL | Age: 46
End: 2021-11-26
Payer: COMMERCIAL

## 2021-11-26 VITALS
RESPIRATION RATE: 16 BRPM | WEIGHT: 161.38 LBS | DIASTOLIC BLOOD PRESSURE: 71 MMHG | BODY MASS INDEX: 26.85 KG/M2 | OXYGEN SATURATION: 96 % | TEMPERATURE: 97.5 F | SYSTOLIC BLOOD PRESSURE: 114 MMHG | HEART RATE: 96 BPM

## 2021-11-26 DIAGNOSIS — R14.0 BLOATING: ICD-10-CM

## 2021-11-26 DIAGNOSIS — R63.0 DECREASED APPETITE: Primary | ICD-10-CM

## 2021-11-26 DIAGNOSIS — R63.5 WEIGHT GAIN: ICD-10-CM

## 2021-11-26 DIAGNOSIS — R10.9 ABDOMINAL PAIN, UNSPECIFIED ABDOMINAL LOCATION: ICD-10-CM

## 2021-11-26 DIAGNOSIS — L50.9 URTICARIA, UNSPECIFIED: ICD-10-CM

## 2021-11-26 LAB
ALBUMIN SERPL BCP-MCNC: 4 G/DL (ref 3.5–5)
ALP SERPL-CCNC: 74 U/L (ref 46–116)
ALT SERPL W P-5'-P-CCNC: 58 U/L (ref 12–78)
ANION GAP SERPL CALCULATED.3IONS-SCNC: 10 MMOL/L (ref 4–13)
AST SERPL W P-5'-P-CCNC: 37 U/L (ref 5–45)
BASOPHILS # BLD AUTO: 0.13 THOUSANDS/ΜL (ref 0–0.1)
BASOPHILS NFR BLD AUTO: 1 % (ref 0–1)
BILIRUB SERPL-MCNC: 0.4 MG/DL (ref 0.2–1)
BUN SERPL-MCNC: 14 MG/DL (ref 5–25)
CALCIUM SERPL-MCNC: 9 MG/DL (ref 8.3–10.1)
CHLORIDE SERPL-SCNC: 101 MMOL/L (ref 100–108)
CO2 SERPL-SCNC: 31 MMOL/L (ref 21–32)
CREAT SERPL-MCNC: 0.98 MG/DL (ref 0.6–1.3)
EOSINOPHIL # BLD AUTO: 0.63 THOUSAND/ΜL (ref 0–0.61)
EOSINOPHIL NFR BLD AUTO: 7 % (ref 0–6)
ERYTHROCYTE [DISTWIDTH] IN BLOOD BY AUTOMATED COUNT: 13.7 % (ref 11.6–15.1)
ESTRADIOL SERPL-MCNC: 58 PG/ML
FLUAV RNA RESP QL NAA+PROBE: NEGATIVE
FLUBV RNA RESP QL NAA+PROBE: NEGATIVE
FSH SERPL-ACNC: 23.2 MIU/ML
GFR SERPL CREATININE-BSD FRML MDRD: 68 ML/MIN/1.73SQ M
GLUCOSE SERPL-MCNC: 78 MG/DL (ref 65–140)
HCT VFR BLD AUTO: 41.2 % (ref 34.8–46.1)
HGB BLD-MCNC: 13.5 G/DL (ref 11.5–15.4)
IMM GRANULOCYTES # BLD AUTO: 0.03 THOUSAND/UL (ref 0–0.2)
IMM GRANULOCYTES NFR BLD AUTO: 0 % (ref 0–2)
LH SERPL-ACNC: 13.8 MIU/ML
LYMPHOCYTES # BLD AUTO: 3.08 THOUSANDS/ΜL (ref 0.6–4.47)
LYMPHOCYTES NFR BLD AUTO: 33 % (ref 14–44)
MCH RBC QN AUTO: 29.9 PG (ref 26.8–34.3)
MCHC RBC AUTO-ENTMCNC: 32.8 G/DL (ref 31.4–37.4)
MCV RBC AUTO: 91 FL (ref 82–98)
MONOCYTES # BLD AUTO: 0.98 THOUSAND/ΜL (ref 0.17–1.22)
MONOCYTES NFR BLD AUTO: 11 % (ref 4–12)
NEUTROPHILS # BLD AUTO: 4.51 THOUSANDS/ΜL (ref 1.85–7.62)
NEUTS SEG NFR BLD AUTO: 48 % (ref 43–75)
NRBC BLD AUTO-RTO: 0 /100 WBCS
PLATELET # BLD AUTO: 459 THOUSANDS/UL (ref 149–390)
PMV BLD AUTO: 9 FL (ref 8.9–12.7)
POTASSIUM SERPL-SCNC: 3.4 MMOL/L (ref 3.5–5.3)
PROT SERPL-MCNC: 8.1 G/DL (ref 6.4–8.2)
RBC # BLD AUTO: 4.52 MILLION/UL (ref 3.81–5.12)
RSV RNA RESP QL NAA+PROBE: NEGATIVE
SARS-COV-2 RNA RESP QL NAA+PROBE: NEGATIVE
SODIUM SERPL-SCNC: 142 MMOL/L (ref 136–145)
WBC # BLD AUTO: 9.36 THOUSAND/UL (ref 4.31–10.16)

## 2021-11-26 PROCEDURE — 0241U HB NFCT DS VIR RESP RNA 4 TRGT: CPT | Performed by: EMERGENCY MEDICINE

## 2021-11-26 PROCEDURE — 99284 EMERGENCY DEPT VISIT MOD MDM: CPT

## 2021-11-26 PROCEDURE — 82670 ASSAY OF TOTAL ESTRADIOL: CPT

## 2021-11-26 PROCEDURE — 83001 ASSAY OF GONADOTROPIN (FSH): CPT

## 2021-11-26 PROCEDURE — 99285 EMERGENCY DEPT VISIT HI MDM: CPT | Performed by: EMERGENCY MEDICINE

## 2021-11-26 PROCEDURE — 36415 COLL VENOUS BLD VENIPUNCTURE: CPT

## 2021-11-26 PROCEDURE — 76700 US EXAM ABDOM COMPLETE: CPT

## 2021-11-26 PROCEDURE — 83002 ASSAY OF GONADOTROPIN (LH): CPT

## 2021-11-26 PROCEDURE — 93005 ELECTROCARDIOGRAM TRACING: CPT

## 2021-11-26 PROCEDURE — 80053 COMPREHEN METABOLIC PANEL: CPT

## 2021-11-26 PROCEDURE — 85025 COMPLETE CBC W/AUTO DIFF WBC: CPT

## 2021-11-26 PROCEDURE — 83520 IMMUNOASSAY QUANT NOS NONAB: CPT

## 2021-11-26 RX ORDER — HALOPERIDOL 5 MG/ML
5 INJECTION INTRAMUSCULAR ONCE
Status: DISCONTINUED | OUTPATIENT
Start: 2021-11-26 | End: 2021-11-26 | Stop reason: HOSPADM

## 2021-11-26 RX ORDER — ONDANSETRON 4 MG/1
4 TABLET, ORALLY DISINTEGRATING ORAL ONCE
Status: DISCONTINUED | OUTPATIENT
Start: 2021-11-26 | End: 2021-11-26 | Stop reason: HOSPADM

## 2021-11-28 LAB — TRYPTASE SERPL-MCNC: 12.8 UG/L (ref 2.2–13.2)

## 2021-11-29 LAB
ATRIAL RATE: 94 BPM
P AXIS: 53 DEGREES
PR INTERVAL: 134 MS
QRS AXIS: 75 DEGREES
QRSD INTERVAL: 82 MS
QT INTERVAL: 378 MS
QTC INTERVAL: 472 MS
T WAVE AXIS: 43 DEGREES
VENTRICULAR RATE: 94 BPM

## 2021-11-29 PROCEDURE — 93010 ELECTROCARDIOGRAM REPORT: CPT | Performed by: INTERNAL MEDICINE

## 2021-12-02 ENCOUNTER — APPOINTMENT (EMERGENCY)
Dept: CT IMAGING | Facility: HOSPITAL | Age: 46
DRG: 386 | End: 2021-12-02
Payer: COMMERCIAL

## 2021-12-02 ENCOUNTER — HOSPITAL ENCOUNTER (OUTPATIENT)
Dept: INFUSION CENTER | Facility: CLINIC | Age: 46
End: 2021-12-02

## 2021-12-02 ENCOUNTER — HOSPITAL ENCOUNTER (INPATIENT)
Facility: HOSPITAL | Age: 46
LOS: 3 days | Discharge: HOME | DRG: 386 | End: 2021-12-07
Attending: EMERGENCY MEDICINE | Admitting: INTERNAL MEDICINE
Payer: COMMERCIAL

## 2021-12-02 ENCOUNTER — TELEPHONE (OUTPATIENT)
Dept: GASTROENTEROLOGY | Facility: CLINIC | Age: 46
End: 2021-12-02

## 2021-12-02 DIAGNOSIS — G43.909 MIGRAINE HEADACHE: ICD-10-CM

## 2021-12-02 DIAGNOSIS — R11.10 VOMITING AND DIARRHEA: ICD-10-CM

## 2021-12-02 DIAGNOSIS — N17.9 AKI (ACUTE KIDNEY INJURY) (HCC): Primary | ICD-10-CM

## 2021-12-02 DIAGNOSIS — E86.0 DEHYDRATION: ICD-10-CM

## 2021-12-02 DIAGNOSIS — K52.9 INFLAMMATORY BOWEL DISEASE: ICD-10-CM

## 2021-12-02 DIAGNOSIS — M54.2 NECK PAIN: ICD-10-CM

## 2021-12-02 DIAGNOSIS — K51.919 ULCERATIVE COLITIS WITH COMPLICATION, UNSPECIFIED LOCATION (HCC): ICD-10-CM

## 2021-12-02 DIAGNOSIS — R11.0 NAUSEA: Primary | ICD-10-CM

## 2021-12-02 DIAGNOSIS — R11.2 INTRACTABLE NAUSEA AND VOMITING: ICD-10-CM

## 2021-12-02 DIAGNOSIS — T78.40XA ALLERGIC REACTION, INITIAL ENCOUNTER: ICD-10-CM

## 2021-12-02 DIAGNOSIS — R19.7 VOMITING AND DIARRHEA: ICD-10-CM

## 2021-12-02 LAB
ALBUMIN SERPL BCP-MCNC: 3.8 G/DL (ref 3.5–5)
ALP SERPL-CCNC: 70 U/L (ref 46–116)
ALT SERPL W P-5'-P-CCNC: 28 U/L (ref 12–78)
ANION GAP SERPL CALCULATED.3IONS-SCNC: 11 MMOL/L (ref 4–13)
AST SERPL W P-5'-P-CCNC: 13 U/L (ref 5–45)
BASOPHILS # BLD AUTO: 0.05 THOUSANDS/ΜL (ref 0–0.1)
BASOPHILS NFR BLD AUTO: 1 % (ref 0–1)
BILIRUB SERPL-MCNC: 0.28 MG/DL (ref 0.2–1)
BUN SERPL-MCNC: 12 MG/DL (ref 5–25)
CALCIUM SERPL-MCNC: 8.7 MG/DL (ref 8.3–10.1)
CHLORIDE SERPL-SCNC: 101 MMOL/L (ref 100–108)
CO2 SERPL-SCNC: 28 MMOL/L (ref 21–32)
CREAT SERPL-MCNC: 1.5 MG/DL (ref 0.6–1.3)
EOSINOPHIL # BLD AUTO: 0.01 THOUSAND/ΜL (ref 0–0.61)
EOSINOPHIL NFR BLD AUTO: 0 % (ref 0–6)
ERYTHROCYTE [DISTWIDTH] IN BLOOD BY AUTOMATED COUNT: 13.3 % (ref 11.6–15.1)
FLUAV RNA RESP QL NAA+PROBE: NEGATIVE
FLUBV RNA RESP QL NAA+PROBE: NEGATIVE
GFR SERPL CREATININE-BSD FRML MDRD: 41 ML/MIN/1.73SQ M
GLUCOSE SERPL-MCNC: 210 MG/DL (ref 65–140)
HCG SERPL QL: NEGATIVE
HCT VFR BLD AUTO: 43.8 % (ref 34.8–46.1)
HGB BLD-MCNC: 14.7 G/DL (ref 11.5–15.4)
IMM GRANULOCYTES # BLD AUTO: 0.05 THOUSAND/UL (ref 0–0.2)
IMM GRANULOCYTES NFR BLD AUTO: 1 % (ref 0–2)
LIPASE SERPL-CCNC: 55 U/L (ref 73–393)
LYMPHOCYTES # BLD AUTO: 1.21 THOUSANDS/ΜL (ref 0.6–4.47)
LYMPHOCYTES NFR BLD AUTO: 13 % (ref 14–44)
MAGNESIUM SERPL-MCNC: 2.1 MG/DL (ref 1.6–2.6)
MCH RBC QN AUTO: 30.4 PG (ref 26.8–34.3)
MCHC RBC AUTO-ENTMCNC: 33.6 G/DL (ref 31.4–37.4)
MCV RBC AUTO: 91 FL (ref 82–98)
MONOCYTES # BLD AUTO: 0.11 THOUSAND/ΜL (ref 0.17–1.22)
MONOCYTES NFR BLD AUTO: 1 % (ref 4–12)
NEUTROPHILS # BLD AUTO: 8.19 THOUSANDS/ΜL (ref 1.85–7.62)
NEUTS SEG NFR BLD AUTO: 84 % (ref 43–75)
NRBC BLD AUTO-RTO: 0 /100 WBCS
PLATELET # BLD AUTO: 492 THOUSANDS/UL (ref 149–390)
PMV BLD AUTO: 8.8 FL (ref 8.9–12.7)
POTASSIUM SERPL-SCNC: 3.6 MMOL/L (ref 3.5–5.3)
PROT SERPL-MCNC: 7.8 G/DL (ref 6.4–8.2)
RBC # BLD AUTO: 4.83 MILLION/UL (ref 3.81–5.12)
RSV RNA RESP QL NAA+PROBE: NEGATIVE
SARS-COV-2 RNA RESP QL NAA+PROBE: NEGATIVE
SODIUM SERPL-SCNC: 140 MMOL/L (ref 136–145)
TSH SERPL DL<=0.05 MIU/L-ACNC: 0.45 UIU/ML (ref 0.36–3.74)
WBC # BLD AUTO: 9.62 THOUSAND/UL (ref 4.31–10.16)

## 2021-12-02 PROCEDURE — 80053 COMPREHEN METABOLIC PANEL: CPT | Performed by: EMERGENCY MEDICINE

## 2021-12-02 PROCEDURE — 84443 ASSAY THYROID STIM HORMONE: CPT | Performed by: EMERGENCY MEDICINE

## 2021-12-02 PROCEDURE — 36415 COLL VENOUS BLD VENIPUNCTURE: CPT | Performed by: EMERGENCY MEDICINE

## 2021-12-02 PROCEDURE — 96374 THER/PROPH/DIAG INJ IV PUSH: CPT

## 2021-12-02 PROCEDURE — 0241U HB NFCT DS VIR RESP RNA 4 TRGT: CPT | Performed by: INTERNAL MEDICINE

## 2021-12-02 PROCEDURE — 83735 ASSAY OF MAGNESIUM: CPT | Performed by: EMERGENCY MEDICINE

## 2021-12-02 PROCEDURE — 84703 CHORIONIC GONADOTROPIN ASSAY: CPT | Performed by: EMERGENCY MEDICINE

## 2021-12-02 PROCEDURE — 96360 HYDRATION IV INFUSION INIT: CPT

## 2021-12-02 PROCEDURE — 99219 PR INITIAL OBSERVATION CARE/DAY 50 MINUTES: CPT | Performed by: INTERNAL MEDICINE

## 2021-12-02 PROCEDURE — 96375 TX/PRO/DX INJ NEW DRUG ADDON: CPT

## 2021-12-02 PROCEDURE — 72125 CT NECK SPINE W/O DYE: CPT

## 2021-12-02 PROCEDURE — 83690 ASSAY OF LIPASE: CPT | Performed by: EMERGENCY MEDICINE

## 2021-12-02 PROCEDURE — 99285 EMERGENCY DEPT VISIT HI MDM: CPT | Performed by: EMERGENCY MEDICINE

## 2021-12-02 PROCEDURE — 99285 EMERGENCY DEPT VISIT HI MDM: CPT

## 2021-12-02 PROCEDURE — 85025 COMPLETE CBC W/AUTO DIFF WBC: CPT | Performed by: EMERGENCY MEDICINE

## 2021-12-02 RX ORDER — SIMETHICONE 80 MG
125 TABLET,CHEWABLE ORAL EVERY 6 HOURS PRN
Status: DISCONTINUED | OUTPATIENT
Start: 2021-12-02 | End: 2021-12-07 | Stop reason: HOSPADM

## 2021-12-02 RX ORDER — LEVOTHYROXINE SODIUM 0.05 MG/1
50 TABLET ORAL
Status: DISCONTINUED | OUTPATIENT
Start: 2021-12-03 | End: 2021-12-03

## 2021-12-02 RX ORDER — MONTELUKAST SODIUM 10 MG/1
10 TABLET ORAL
Status: DISCONTINUED | OUTPATIENT
Start: 2021-12-03 | End: 2021-12-03

## 2021-12-02 RX ORDER — LORATADINE 10 MG/1
10 TABLET ORAL DAILY
Status: DISCONTINUED | OUTPATIENT
Start: 2021-12-03 | End: 2021-12-03

## 2021-12-02 RX ORDER — METHYLPREDNISOLONE SODIUM SUCCINATE 125 MG/2ML
125 INJECTION, POWDER, LYOPHILIZED, FOR SOLUTION INTRAMUSCULAR; INTRAVENOUS ONCE
Status: COMPLETED | OUTPATIENT
Start: 2021-12-02 | End: 2021-12-02

## 2021-12-02 RX ORDER — ALBUTEROL SULFATE 90 UG/1
1 AEROSOL, METERED RESPIRATORY (INHALATION) EVERY 4 HOURS PRN
Status: DISCONTINUED | OUTPATIENT
Start: 2021-12-02 | End: 2021-12-07 | Stop reason: HOSPADM

## 2021-12-02 RX ORDER — PREDNISONE 20 MG/1
TABLET ORAL
Qty: 100 TABLET | Refills: 0 | Status: SHIPPED | OUTPATIENT
Start: 2021-12-02 | End: 2022-04-14

## 2021-12-02 RX ORDER — EPINEPHRINE 0.1 MG/ML
1 SYRINGE (ML) INJECTION ONCE
Status: DISCONTINUED | OUTPATIENT
Start: 2021-12-02 | End: 2021-12-02

## 2021-12-02 RX ORDER — METOCLOPRAMIDE HYDROCHLORIDE 5 MG/ML
10 INJECTION INTRAMUSCULAR; INTRAVENOUS ONCE
Status: COMPLETED | OUTPATIENT
Start: 2021-12-02 | End: 2021-12-02

## 2021-12-02 RX ORDER — PANTOPRAZOLE SODIUM 40 MG/1
40 TABLET, DELAYED RELEASE ORAL
Status: DISCONTINUED | OUTPATIENT
Start: 2021-12-03 | End: 2021-12-03

## 2021-12-02 RX ORDER — BUTALBITAL, ACETAMINOPHEN AND CAFFEINE 50; 325; 40 MG/1; MG/1; MG/1
1 TABLET ORAL EVERY 8 HOURS PRN
Status: DISCONTINUED | OUTPATIENT
Start: 2021-12-02 | End: 2021-12-03

## 2021-12-02 RX ORDER — ONDANSETRON 2 MG/ML
4 INJECTION INTRAMUSCULAR; INTRAVENOUS ONCE
Status: COMPLETED | OUTPATIENT
Start: 2021-12-02 | End: 2021-12-02

## 2021-12-02 RX ORDER — DIPHENHYDRAMINE HYDROCHLORIDE 50 MG/ML
25 INJECTION INTRAMUSCULAR; INTRAVENOUS ONCE
Status: COMPLETED | OUTPATIENT
Start: 2021-12-02 | End: 2021-12-02

## 2021-12-02 RX ORDER — TEMAZEPAM 15 MG/1
30 CAPSULE ORAL
Status: DISCONTINUED | OUTPATIENT
Start: 2021-12-02 | End: 2021-12-03

## 2021-12-02 RX ORDER — SODIUM CHLORIDE 9 MG/ML
175 INJECTION, SOLUTION INTRAVENOUS CONTINUOUS
Status: DISCONTINUED | OUTPATIENT
Start: 2021-12-02 | End: 2021-12-03

## 2021-12-02 RX ORDER — HEPARIN SODIUM 5000 [USP'U]/ML
5000 INJECTION, SOLUTION INTRAVENOUS; SUBCUTANEOUS EVERY 8 HOURS SCHEDULED
Status: DISCONTINUED | OUTPATIENT
Start: 2021-12-03 | End: 2021-12-07 | Stop reason: HOSPADM

## 2021-12-02 RX ORDER — ACETAMINOPHEN 325 MG/1
650 TABLET ORAL EVERY 6 HOURS PRN
Status: DISCONTINUED | OUTPATIENT
Start: 2021-12-02 | End: 2021-12-07 | Stop reason: HOSPADM

## 2021-12-02 RX ORDER — PROCHLORPERAZINE 25 MG
25 SUPPOSITORY, RECTAL RECTAL EVERY 12 HOURS PRN
Status: DISCONTINUED | OUTPATIENT
Start: 2021-12-02 | End: 2021-12-07 | Stop reason: HOSPADM

## 2021-12-02 RX ORDER — SODIUM CHLORIDE 9 MG/ML
75 INJECTION, SOLUTION INTRAVENOUS CONTINUOUS
Status: DISCONTINUED | OUTPATIENT
Start: 2021-12-03 | End: 2021-12-04

## 2021-12-02 RX ORDER — LIOTHYRONINE SODIUM 5 UG/1
5 TABLET ORAL DAILY
Status: DISCONTINUED | OUTPATIENT
Start: 2021-12-03 | End: 2021-12-03

## 2021-12-02 RX ORDER — LORAZEPAM 1 MG/1
1 TABLET ORAL EVERY 8 HOURS PRN
Status: DISCONTINUED | OUTPATIENT
Start: 2021-12-02 | End: 2021-12-03

## 2021-12-02 RX ORDER — EPINEPHRINE 1 MG/ML
1 INJECTION, SOLUTION, CONCENTRATE INTRAVENOUS ONCE
Status: COMPLETED | OUTPATIENT
Start: 2021-12-02 | End: 2021-12-02

## 2021-12-02 RX ORDER — HYDROXYZINE HYDROCHLORIDE 25 MG/1
25 TABLET, FILM COATED ORAL 4 TIMES DAILY
Status: DISCONTINUED | OUTPATIENT
Start: 2021-12-03 | End: 2021-12-03

## 2021-12-02 RX ORDER — PROCHLORPERAZINE 25 MG
25 SUPPOSITORY, RECTAL RECTAL EVERY 12 HOURS PRN
Qty: 12 SUPPOSITORY | Refills: 0 | Status: SHIPPED | OUTPATIENT
Start: 2021-12-02 | End: 2021-12-07 | Stop reason: HOSPADM

## 2021-12-02 RX ADMIN — ONDANSETRON 4 MG: 2 INJECTION INTRAMUSCULAR; INTRAVENOUS at 17:23

## 2021-12-02 RX ADMIN — METOCLOPRAMIDE HYDROCHLORIDE 10 MG: 5 INJECTION INTRAMUSCULAR; INTRAVENOUS at 18:48

## 2021-12-02 RX ADMIN — METHYLPREDNISOLONE SODIUM SUCCINATE 125 MG: 125 INJECTION, POWDER, FOR SOLUTION INTRAMUSCULAR; INTRAVENOUS at 17:23

## 2021-12-02 RX ADMIN — SODIUM CHLORIDE 175 ML/HR: 0.9 INJECTION, SOLUTION INTRAVENOUS at 20:39

## 2021-12-02 RX ADMIN — FAMOTIDINE 20 MG: 10 INJECTION INTRAVENOUS at 17:24

## 2021-12-02 RX ADMIN — SODIUM CHLORIDE 2000 ML: 0.9 INJECTION, SOLUTION INTRAVENOUS at 17:23

## 2021-12-02 RX ADMIN — DIPHENHYDRAMINE HYDROCHLORIDE 25 MG: 50 INJECTION, SOLUTION INTRAMUSCULAR; INTRAVENOUS at 17:24

## 2021-12-03 ENCOUNTER — APPOINTMENT (OUTPATIENT)
Dept: RADIOLOGY | Facility: HOSPITAL | Age: 46
DRG: 386 | End: 2021-12-03
Payer: COMMERCIAL

## 2021-12-03 LAB
ALBUMIN SERPL BCP-MCNC: 3.4 G/DL (ref 3.5–5)
ALP SERPL-CCNC: 58 U/L (ref 46–116)
ALT SERPL W P-5'-P-CCNC: 24 U/L (ref 12–78)
ANION GAP SERPL CALCULATED.3IONS-SCNC: 11 MMOL/L (ref 4–13)
AST SERPL W P-5'-P-CCNC: 10 U/L (ref 5–45)
BACTERIA UR QL AUTO: ABNORMAL /HPF
BILIRUB SERPL-MCNC: 0.4 MG/DL (ref 0.2–1)
BILIRUB UR QL STRIP: NEGATIVE
BUN SERPL-MCNC: 7 MG/DL (ref 5–25)
CALCIUM ALBUM COR SERPL-MCNC: 8.7 MG/DL (ref 8.3–10.1)
CALCIUM SERPL-MCNC: 8.2 MG/DL (ref 8.3–10.1)
CHLORIDE SERPL-SCNC: 105 MMOL/L (ref 100–108)
CLARITY UR: CLEAR
CO2 SERPL-SCNC: 24 MMOL/L (ref 21–32)
COLOR UR: ABNORMAL
CREAT SERPL-MCNC: 0.67 MG/DL (ref 0.6–1.3)
ERYTHROCYTE [DISTWIDTH] IN BLOOD BY AUTOMATED COUNT: 13.6 % (ref 11.6–15.1)
GFR SERPL CREATININE-BSD FRML MDRD: 104 ML/MIN/1.73SQ M
GLUCOSE SERPL-MCNC: 131 MG/DL (ref 65–140)
GLUCOSE UR STRIP-MCNC: NEGATIVE MG/DL
HCT VFR BLD AUTO: 41.8 % (ref 34.8–46.1)
HGB BLD-MCNC: 13.6 G/DL (ref 11.5–15.4)
HGB UR QL STRIP.AUTO: ABNORMAL
KETONES UR STRIP-MCNC: NEGATIVE MG/DL
LEUKOCYTE ESTERASE UR QL STRIP: NEGATIVE
MAGNESIUM SERPL-MCNC: 2 MG/DL (ref 1.6–2.6)
MCH RBC QN AUTO: 29.8 PG (ref 26.8–34.3)
MCHC RBC AUTO-ENTMCNC: 32.5 G/DL (ref 31.4–37.4)
MCV RBC AUTO: 92 FL (ref 82–98)
NITRITE UR QL STRIP: NEGATIVE
NON-SQ EPI CELLS URNS QL MICRO: ABNORMAL /HPF
PH UR STRIP.AUTO: 6.5 [PH]
PHOSPHATE SERPL-MCNC: 2.2 MG/DL (ref 2.7–4.5)
PLATELET # BLD AUTO: 394 THOUSANDS/UL (ref 149–390)
PMV BLD AUTO: 10.1 FL (ref 8.9–12.7)
POTASSIUM SERPL-SCNC: 3.7 MMOL/L (ref 3.5–5.3)
PROT SERPL-MCNC: 7.2 G/DL (ref 6.4–8.2)
PROT UR STRIP-MCNC: NEGATIVE MG/DL
RBC # BLD AUTO: 4.56 MILLION/UL (ref 3.81–5.12)
RBC #/AREA URNS AUTO: ABNORMAL /HPF
SODIUM SERPL-SCNC: 140 MMOL/L (ref 136–145)
SP GR UR STRIP.AUTO: <=1.005 (ref 1–1.03)
UROBILINOGEN UR QL STRIP.AUTO: 0.2 E.U./DL
WBC # BLD AUTO: 10.68 THOUSAND/UL (ref 4.31–10.16)
WBC #/AREA URNS AUTO: ABNORMAL /HPF

## 2021-12-03 PROCEDURE — 93005 ELECTROCARDIOGRAM TRACING: CPT

## 2021-12-03 PROCEDURE — 99222 1ST HOSP IP/OBS MODERATE 55: CPT | Performed by: NURSE PRACTITIONER

## 2021-12-03 PROCEDURE — 36415 COLL VENOUS BLD VENIPUNCTURE: CPT | Performed by: PHYSICIAN ASSISTANT

## 2021-12-03 PROCEDURE — 80053 COMPREHEN METABOLIC PANEL: CPT | Performed by: PHYSICIAN ASSISTANT

## 2021-12-03 PROCEDURE — 81001 URINALYSIS AUTO W/SCOPE: CPT | Performed by: INTERNAL MEDICINE

## 2021-12-03 PROCEDURE — 83735 ASSAY OF MAGNESIUM: CPT | Performed by: PHYSICIAN ASSISTANT

## 2021-12-03 PROCEDURE — 99225 PR SBSQ OBSERVATION CARE/DAY 25 MINUTES: CPT | Performed by: INTERNAL MEDICINE

## 2021-12-03 PROCEDURE — 85027 COMPLETE CBC AUTOMATED: CPT | Performed by: PHYSICIAN ASSISTANT

## 2021-12-03 PROCEDURE — 74018 RADEX ABDOMEN 1 VIEW: CPT

## 2021-12-03 PROCEDURE — 84100 ASSAY OF PHOSPHORUS: CPT | Performed by: PHYSICIAN ASSISTANT

## 2021-12-03 RX ORDER — BUTALBITAL, ACETAMINOPHEN AND CAFFEINE 50; 325; 40 MG/1; MG/1; MG/1
1 TABLET ORAL EVERY 8 HOURS PRN
Status: DISCONTINUED | OUTPATIENT
Start: 2021-12-03 | End: 2021-12-07 | Stop reason: HOSPADM

## 2021-12-03 RX ORDER — LEVOTHYROXINE SODIUM 0.07 MG/1
75 TABLET ORAL
Status: DISCONTINUED | OUTPATIENT
Start: 2021-12-04 | End: 2021-12-04

## 2021-12-03 RX ORDER — METHYLPREDNISOLONE SODIUM SUCCINATE 125 MG/2ML
80 INJECTION, POWDER, LYOPHILIZED, FOR SOLUTION INTRAMUSCULAR; INTRAVENOUS ONCE
Status: COMPLETED | OUTPATIENT
Start: 2021-12-03 | End: 2021-12-03

## 2021-12-03 RX ORDER — PREDNISONE 20 MG/1
40 TABLET ORAL DAILY
Status: DISCONTINUED | OUTPATIENT
Start: 2021-12-04 | End: 2021-12-04

## 2021-12-03 RX ORDER — DIPHENHYDRAMINE HYDROCHLORIDE 50 MG/ML
25 INJECTION INTRAMUSCULAR; INTRAVENOUS ONCE
Status: COMPLETED | OUTPATIENT
Start: 2021-12-03 | End: 2021-12-03

## 2021-12-03 RX ORDER — LIOTHYRONINE SODIUM 5 UG/1
5 TABLET ORAL DAILY
Status: DISCONTINUED | OUTPATIENT
Start: 2021-12-04 | End: 2021-12-07 | Stop reason: HOSPADM

## 2021-12-03 RX ORDER — PREDNISONE 20 MG/1
40 TABLET ORAL DAILY
Status: DISCONTINUED | OUTPATIENT
Start: 2021-12-03 | End: 2021-12-03

## 2021-12-03 RX ORDER — TRAMADOL HYDROCHLORIDE 50 MG/1
50 TABLET ORAL ONCE
Status: COMPLETED | OUTPATIENT
Start: 2021-12-03 | End: 2021-12-03

## 2021-12-03 RX ORDER — HYDROXYZINE HYDROCHLORIDE 25 MG/1
25 TABLET, FILM COATED ORAL 4 TIMES DAILY
Status: DISCONTINUED | OUTPATIENT
Start: 2021-12-03 | End: 2021-12-03

## 2021-12-03 RX ORDER — METHYLPREDNISOLONE SODIUM SUCCINATE 125 MG/2ML
INJECTION, POWDER, LYOPHILIZED, FOR SOLUTION INTRAMUSCULAR; INTRAVENOUS
Status: COMPLETED
Start: 2021-12-03 | End: 2021-12-03

## 2021-12-03 RX ORDER — TEMAZEPAM 30 MG/1
30 CAPSULE ORAL
Status: DISCONTINUED | OUTPATIENT
Start: 2021-12-03 | End: 2021-12-07 | Stop reason: HOSPADM

## 2021-12-03 RX ORDER — DIPHENHYDRAMINE HYDROCHLORIDE 50 MG/ML
INJECTION INTRAMUSCULAR; INTRAVENOUS
Status: COMPLETED
Start: 2021-12-03 | End: 2021-12-03

## 2021-12-03 RX ORDER — MONTELUKAST SODIUM 10 MG/1
10 TABLET ORAL
Status: DISCONTINUED | OUTPATIENT
Start: 2021-12-03 | End: 2021-12-07 | Stop reason: HOSPADM

## 2021-12-03 RX ORDER — ONDANSETRON 2 MG/ML
4 INJECTION INTRAMUSCULAR; INTRAVENOUS ONCE
Status: COMPLETED | OUTPATIENT
Start: 2021-12-03 | End: 2021-12-03

## 2021-12-03 RX ORDER — CETIRIZINE HYDROCHLORIDE 10 MG/1
10 TABLET ORAL DAILY
Status: DISCONTINUED | OUTPATIENT
Start: 2021-12-04 | End: 2021-12-07 | Stop reason: HOSPADM

## 2021-12-03 RX ORDER — PANTOPRAZOLE SODIUM 40 MG/1
40 TABLET, DELAYED RELEASE ORAL
Status: DISCONTINUED | OUTPATIENT
Start: 2021-12-04 | End: 2021-12-04

## 2021-12-03 RX ORDER — ONDANSETRON 2 MG/ML
4 INJECTION INTRAMUSCULAR; INTRAVENOUS EVERY 4 HOURS PRN
Status: DISCONTINUED | OUTPATIENT
Start: 2021-12-03 | End: 2021-12-07 | Stop reason: HOSPADM

## 2021-12-03 RX ORDER — LORAZEPAM 1 MG/1
1 TABLET ORAL EVERY 8 HOURS PRN
Status: DISCONTINUED | OUTPATIENT
Start: 2021-12-03 | End: 2021-12-04

## 2021-12-03 RX ADMIN — ONDANSETRON 4 MG: 2 INJECTION INTRAMUSCULAR; INTRAVENOUS at 20:05

## 2021-12-03 RX ADMIN — SODIUM CHLORIDE 75 ML/HR: 0.9 INJECTION, SOLUTION INTRAVENOUS at 01:29

## 2021-12-03 RX ADMIN — FAMOTIDINE 20 MG: 10 INJECTION INTRAVENOUS at 20:40

## 2021-12-03 RX ADMIN — DIPHENHYDRAMINE HYDROCHLORIDE 25 MG: 50 INJECTION INTRAMUSCULAR; INTRAVENOUS at 20:10

## 2021-12-03 RX ADMIN — LEVOTHYROXINE SODIUM 50 MCG: 50 TABLET ORAL at 05:48

## 2021-12-03 RX ADMIN — PANTOPRAZOLE SODIUM 40 MG: 40 TABLET, DELAYED RELEASE ORAL at 05:48

## 2021-12-03 RX ADMIN — HEPARIN SODIUM 5000 UNITS: 5000 INJECTION INTRAVENOUS; SUBCUTANEOUS at 22:15

## 2021-12-03 RX ADMIN — HYDROXYZINE HYDROCHLORIDE 25 MG: 25 TABLET ORAL at 18:48

## 2021-12-03 RX ADMIN — TRAMADOL HYDROCHLORIDE 50 MG: 50 TABLET, FILM COATED ORAL at 05:48

## 2021-12-03 RX ADMIN — HYDROXYZINE HYDROCHLORIDE 25 MG: 25 TABLET ORAL at 08:57

## 2021-12-03 RX ADMIN — HYDROXYZINE HYDROCHLORIDE 25 MG: 25 TABLET ORAL at 13:40

## 2021-12-03 RX ADMIN — MONTELUKAST 10 MG: 10 TABLET, FILM COATED ORAL at 01:59

## 2021-12-03 RX ADMIN — HYDROXYZINE HYDROCHLORIDE 25 MG: 25 TABLET ORAL at 01:33

## 2021-12-03 RX ADMIN — HEPARIN SODIUM 5000 UNITS: 5000 INJECTION INTRAVENOUS; SUBCUTANEOUS at 01:29

## 2021-12-03 RX ADMIN — BUTALBITAL, ACETAMINOPHEN AND CAFFEINE 1 TABLET: 50; 325; 40 TABLET ORAL at 01:32

## 2021-12-03 RX ADMIN — METHYLPREDNISOLONE SODIUM SUCCINATE 80 MG: 125 INJECTION, POWDER, FOR SOLUTION INTRAMUSCULAR; INTRAVENOUS at 20:23

## 2021-12-03 RX ADMIN — LORAZEPAM 1 MG: 1 TABLET ORAL at 15:29

## 2021-12-03 RX ADMIN — LORAZEPAM 1 MG: 1 TABLET ORAL at 09:46

## 2021-12-03 RX ADMIN — HEPARIN SODIUM 5000 UNITS: 5000 INJECTION INTRAVENOUS; SUBCUTANEOUS at 13:40

## 2021-12-03 RX ADMIN — METHYLPREDNISOLONE SODIUM SUCCINATE 80 MG: 125 INJECTION, POWDER, LYOPHILIZED, FOR SOLUTION INTRAMUSCULAR; INTRAVENOUS at 20:23

## 2021-12-03 RX ADMIN — POTASSIUM CHLORIDE 30 MEQ: 1500 TABLET, EXTENDED RELEASE ORAL at 09:47

## 2021-12-04 PROBLEM — T78.2XXA ANAPHYLACTIC REACTION: Status: ACTIVE | Noted: 2021-12-04

## 2021-12-04 PROBLEM — N17.9 AKI (ACUTE KIDNEY INJURY) (HCC): Status: RESOLVED | Noted: 2021-12-02 | Resolved: 2021-12-04

## 2021-12-04 LAB
ANION GAP SERPL CALCULATED.3IONS-SCNC: 9 MMOL/L (ref 4–13)
BASOPHILS # BLD AUTO: 0.02 THOUSANDS/ΜL (ref 0–0.1)
BASOPHILS NFR BLD AUTO: 0 % (ref 0–1)
BUN SERPL-MCNC: 8 MG/DL (ref 5–25)
CALCIUM SERPL-MCNC: 8.4 MG/DL (ref 8.3–10.1)
CHLORIDE SERPL-SCNC: 105 MMOL/L (ref 100–108)
CO2 SERPL-SCNC: 27 MMOL/L (ref 21–32)
CREAT SERPL-MCNC: 0.82 MG/DL (ref 0.6–1.3)
EOSINOPHIL # BLD AUTO: 0 THOUSAND/ΜL (ref 0–0.61)
EOSINOPHIL NFR BLD AUTO: 0 % (ref 0–6)
ERYTHROCYTE [DISTWIDTH] IN BLOOD BY AUTOMATED COUNT: 13.8 % (ref 11.6–15.1)
GFR SERPL CREATININE-BSD FRML MDRD: 85 ML/MIN/1.73SQ M
GLUCOSE P FAST SERPL-MCNC: 104 MG/DL (ref 65–99)
GLUCOSE SERPL-MCNC: 104 MG/DL (ref 65–140)
HCT VFR BLD AUTO: 43.2 % (ref 34.8–46.1)
HGB BLD-MCNC: 13.9 G/DL (ref 11.5–15.4)
IMM GRANULOCYTES # BLD AUTO: 0.03 THOUSAND/UL (ref 0–0.2)
IMM GRANULOCYTES NFR BLD AUTO: 0 % (ref 0–2)
LYMPHOCYTES # BLD AUTO: 1.07 THOUSANDS/ΜL (ref 0.6–4.47)
LYMPHOCYTES NFR BLD AUTO: 10 % (ref 14–44)
MAGNESIUM SERPL-MCNC: 2.2 MG/DL (ref 1.6–2.6)
MCH RBC QN AUTO: 29.9 PG (ref 26.8–34.3)
MCHC RBC AUTO-ENTMCNC: 32.2 G/DL (ref 31.4–37.4)
MCV RBC AUTO: 93 FL (ref 82–98)
MONOCYTES # BLD AUTO: 0.11 THOUSAND/ΜL (ref 0.17–1.22)
MONOCYTES NFR BLD AUTO: 1 % (ref 4–12)
NEUTROPHILS # BLD AUTO: 9.33 THOUSANDS/ΜL (ref 1.85–7.62)
NEUTS SEG NFR BLD AUTO: 89 % (ref 43–75)
NRBC BLD AUTO-RTO: 0 /100 WBCS
PLATELET # BLD AUTO: 452 THOUSANDS/UL (ref 149–390)
PMV BLD AUTO: 9.2 FL (ref 8.9–12.7)
POTASSIUM SERPL-SCNC: 4.2 MMOL/L (ref 3.5–5.3)
RBC # BLD AUTO: 4.65 MILLION/UL (ref 3.81–5.12)
SODIUM SERPL-SCNC: 141 MMOL/L (ref 136–145)
WBC # BLD AUTO: 10.56 THOUSAND/UL (ref 4.31–10.16)

## 2021-12-04 PROCEDURE — C9113 INJ PANTOPRAZOLE SODIUM, VIA: HCPCS | Performed by: PHYSICIAN ASSISTANT

## 2021-12-04 PROCEDURE — 99225 PR SBSQ OBSERVATION CARE/DAY 25 MINUTES: CPT | Performed by: PHYSICIAN ASSISTANT

## 2021-12-04 PROCEDURE — 80048 BASIC METABOLIC PNL TOTAL CA: CPT | Performed by: INTERNAL MEDICINE

## 2021-12-04 PROCEDURE — 83735 ASSAY OF MAGNESIUM: CPT | Performed by: INTERNAL MEDICINE

## 2021-12-04 PROCEDURE — 85025 COMPLETE CBC W/AUTO DIFF WBC: CPT | Performed by: INTERNAL MEDICINE

## 2021-12-04 RX ORDER — SODIUM CHLORIDE 9 MG/ML
75 INJECTION, SOLUTION INTRAVENOUS CONTINUOUS
Status: DISCONTINUED | OUTPATIENT
Start: 2021-12-04 | End: 2021-12-05

## 2021-12-04 RX ORDER — LORAZEPAM 2 MG/ML
1 INJECTION INTRAMUSCULAR EVERY 6 HOURS
Status: DISCONTINUED | OUTPATIENT
Start: 2021-12-04 | End: 2021-12-07 | Stop reason: HOSPADM

## 2021-12-04 RX ORDER — PANTOPRAZOLE SODIUM 40 MG/1
40 INJECTION, POWDER, FOR SOLUTION INTRAVENOUS
Status: DISCONTINUED | OUTPATIENT
Start: 2021-12-04 | End: 2021-12-07 | Stop reason: HOSPADM

## 2021-12-04 RX ORDER — SODIUM CHLORIDE, SODIUM LACTATE, POTASSIUM CHLORIDE, CALCIUM CHLORIDE 600; 310; 30; 20 MG/100ML; MG/100ML; MG/100ML; MG/100ML
75 INJECTION, SOLUTION INTRAVENOUS CONTINUOUS
Status: DISCONTINUED | OUTPATIENT
Start: 2021-12-04 | End: 2021-12-04

## 2021-12-04 RX ORDER — DIPHENHYDRAMINE HYDROCHLORIDE 50 MG/ML
50 INJECTION INTRAMUSCULAR; INTRAVENOUS ONCE
Status: COMPLETED | OUTPATIENT
Start: 2021-12-04 | End: 2021-12-04

## 2021-12-04 RX ORDER — SODIUM CHLORIDE 9 MG/ML
75 INJECTION, SOLUTION INTRAVENOUS CONTINUOUS
Status: DISCONTINUED | OUTPATIENT
Start: 2021-12-04 | End: 2021-12-04

## 2021-12-04 RX ORDER — HYDROXYZINE HCL 10 MG/5 ML
25 SOLUTION, ORAL ORAL EVERY 6 HOURS PRN
Status: DISCONTINUED | OUTPATIENT
Start: 2021-12-06 | End: 2021-12-07 | Stop reason: HOSPADM

## 2021-12-04 RX ORDER — HYDROXYZINE HCL 10 MG/5 ML
25 SOLUTION, ORAL ORAL 4 TIMES DAILY
Status: DISCONTINUED | OUTPATIENT
Start: 2021-12-04 | End: 2021-12-04

## 2021-12-04 RX ORDER — METHYLPREDNISOLONE SODIUM SUCCINATE 40 MG/ML
40 INJECTION, POWDER, LYOPHILIZED, FOR SOLUTION INTRAMUSCULAR; INTRAVENOUS DAILY
Status: DISCONTINUED | OUTPATIENT
Start: 2021-12-04 | End: 2021-12-06

## 2021-12-04 RX ORDER — DIPHENHYDRAMINE HYDROCHLORIDE 50 MG/ML
25 INJECTION INTRAMUSCULAR; INTRAVENOUS ONCE
Status: COMPLETED | OUTPATIENT
Start: 2021-12-04 | End: 2021-12-04

## 2021-12-04 RX ADMIN — ONDANSETRON 4 MG: 2 INJECTION INTRAMUSCULAR; INTRAVENOUS at 14:48

## 2021-12-04 RX ADMIN — ONDANSETRON 4 MG: 2 INJECTION INTRAMUSCULAR; INTRAVENOUS at 22:22

## 2021-12-04 RX ADMIN — DIPHENHYDRAMINE HYDROCHLORIDE 25 MG: 50 INJECTION, SOLUTION INTRAMUSCULAR; INTRAVENOUS at 14:48

## 2021-12-04 RX ADMIN — LORAZEPAM 1 MG: 2 INJECTION INTRAMUSCULAR; INTRAVENOUS at 10:24

## 2021-12-04 RX ADMIN — FAMOTIDINE 20 MG: 10 INJECTION, SOLUTION INTRAVENOUS at 18:26

## 2021-12-04 RX ADMIN — HEPARIN SODIUM 5000 UNITS: 5000 INJECTION INTRAVENOUS; SUBCUTANEOUS at 22:24

## 2021-12-04 RX ADMIN — LORAZEPAM 1 MG: 2 INJECTION INTRAMUSCULAR; INTRAVENOUS at 14:48

## 2021-12-04 RX ADMIN — DIPHENHYDRAMINE HYDROCHLORIDE 50 MG: 50 INJECTION, SOLUTION INTRAMUSCULAR; INTRAVENOUS at 18:26

## 2021-12-04 RX ADMIN — HEPARIN SODIUM 5000 UNITS: 5000 INJECTION INTRAVENOUS; SUBCUTANEOUS at 14:48

## 2021-12-04 RX ADMIN — DIPHENHYDRAMINE HYDROCHLORIDE 25 MG: 50 INJECTION, SOLUTION INTRAMUSCULAR; INTRAVENOUS at 05:06

## 2021-12-04 RX ADMIN — METHYLPREDNISOLONE SODIUM SUCCINATE 40 MG: 40 INJECTION, POWDER, FOR SOLUTION INTRAMUSCULAR; INTRAVENOUS at 10:25

## 2021-12-04 RX ADMIN — PANTOPRAZOLE SODIUM 40 MG: 40 INJECTION, POWDER, FOR SOLUTION INTRAVENOUS at 10:25

## 2021-12-04 RX ADMIN — SODIUM CHLORIDE 75 ML/HR: 0.9 INJECTION, SOLUTION INTRAVENOUS at 19:29

## 2021-12-04 RX ADMIN — HEPARIN SODIUM 5000 UNITS: 5000 INJECTION INTRAVENOUS; SUBCUTANEOUS at 05:07

## 2021-12-04 RX ADMIN — LORAZEPAM 1 MG: 2 INJECTION INTRAMUSCULAR; INTRAVENOUS at 22:21

## 2021-12-04 RX ADMIN — ONDANSETRON 4 MG: 2 INJECTION INTRAMUSCULAR; INTRAVENOUS at 06:17

## 2021-12-04 RX ADMIN — SODIUM CHLORIDE 75 ML/HR: 0.9 INJECTION, SOLUTION INTRAVENOUS at 03:02

## 2021-12-04 RX ADMIN — SODIUM CHLORIDE, SODIUM LACTATE, POTASSIUM CHLORIDE, AND CALCIUM CHLORIDE 75 ML/HR: .6; .31; .03; .02 INJECTION, SOLUTION INTRAVENOUS at 17:03

## 2021-12-05 LAB
ALBUMIN SERPL BCP-MCNC: 3.5 G/DL (ref 3.5–5)
ALP SERPL-CCNC: 57 U/L (ref 46–116)
ALT SERPL W P-5'-P-CCNC: 22 U/L (ref 12–78)
ANION GAP SERPL CALCULATED.3IONS-SCNC: 11 MMOL/L (ref 4–13)
AST SERPL W P-5'-P-CCNC: 15 U/L (ref 5–45)
BASOPHILS # BLD AUTO: 0.06 THOUSANDS/ΜL (ref 0–0.1)
BASOPHILS NFR BLD AUTO: 0 % (ref 0–1)
BILIRUB SERPL-MCNC: 0.37 MG/DL (ref 0.2–1)
BUN SERPL-MCNC: 9 MG/DL (ref 5–25)
CALCIUM SERPL-MCNC: 8.2 MG/DL (ref 8.3–10.1)
CHLORIDE SERPL-SCNC: 103 MMOL/L (ref 100–108)
CO2 SERPL-SCNC: 24 MMOL/L (ref 21–32)
CREAT SERPL-MCNC: 0.74 MG/DL (ref 0.6–1.3)
CRP SERPL QL: 3 MG/L
EOSINOPHIL # BLD AUTO: 0.01 THOUSAND/ΜL (ref 0–0.61)
EOSINOPHIL NFR BLD AUTO: 0 % (ref 0–6)
ERYTHROCYTE [DISTWIDTH] IN BLOOD BY AUTOMATED COUNT: 13.5 % (ref 11.6–15.1)
GFR SERPL CREATININE-BSD FRML MDRD: 96 ML/MIN/1.73SQ M
GLUCOSE SERPL-MCNC: 76 MG/DL (ref 65–140)
HCT VFR BLD AUTO: 40.2 % (ref 34.8–46.1)
HGB BLD-MCNC: 13.5 G/DL (ref 11.5–15.4)
IMM GRANULOCYTES # BLD AUTO: 0.07 THOUSAND/UL (ref 0–0.2)
IMM GRANULOCYTES NFR BLD AUTO: 1 % (ref 0–2)
LIPASE SERPL-CCNC: 96 U/L (ref 73–393)
LYMPHOCYTES # BLD AUTO: 1.26 THOUSANDS/ΜL (ref 0.6–4.47)
LYMPHOCYTES NFR BLD AUTO: 9 % (ref 14–44)
Lab: NORMAL
MAGNESIUM SERPL-MCNC: 2.1 MG/DL (ref 1.6–2.6)
MCH RBC QN AUTO: 30.5 PG (ref 26.8–34.3)
MCHC RBC AUTO-ENTMCNC: 33.6 G/DL (ref 31.4–37.4)
MCV RBC AUTO: 91 FL (ref 82–98)
MISCELLANEOUS LAB TEST RESULT: NORMAL
MONOCYTES # BLD AUTO: 0.32 THOUSAND/ΜL (ref 0.17–1.22)
MONOCYTES NFR BLD AUTO: 2 % (ref 4–12)
NEUTROPHILS # BLD AUTO: 12.49 THOUSANDS/ΜL (ref 1.85–7.62)
NEUTS SEG NFR BLD AUTO: 88 % (ref 43–75)
NRBC BLD AUTO-RTO: 0 /100 WBCS
PLATELET # BLD AUTO: 428 THOUSANDS/UL (ref 149–390)
PMV BLD AUTO: 8.8 FL (ref 8.9–12.7)
POTASSIUM SERPL-SCNC: 3.6 MMOL/L (ref 3.5–5.3)
PROT SERPL-MCNC: 7.2 G/DL (ref 6.4–8.2)
RBC # BLD AUTO: 4.42 MILLION/UL (ref 3.81–5.12)
SODIUM SERPL-SCNC: 138 MMOL/L (ref 136–145)
STONE ANALYSIS-IMP: NORMAL
WBC # BLD AUTO: 14.21 THOUSAND/UL (ref 4.31–10.16)

## 2021-12-05 PROCEDURE — 80053 COMPREHEN METABOLIC PANEL: CPT | Performed by: PHYSICIAN ASSISTANT

## 2021-12-05 PROCEDURE — 86140 C-REACTIVE PROTEIN: CPT | Performed by: PHYSICIAN ASSISTANT

## 2021-12-05 PROCEDURE — 83735 ASSAY OF MAGNESIUM: CPT | Performed by: PHYSICIAN ASSISTANT

## 2021-12-05 PROCEDURE — C9113 INJ PANTOPRAZOLE SODIUM, VIA: HCPCS | Performed by: PHYSICIAN ASSISTANT

## 2021-12-05 PROCEDURE — 85025 COMPLETE CBC W/AUTO DIFF WBC: CPT | Performed by: PHYSICIAN ASSISTANT

## 2021-12-05 PROCEDURE — 83690 ASSAY OF LIPASE: CPT | Performed by: PHYSICIAN ASSISTANT

## 2021-12-05 PROCEDURE — 99232 SBSQ HOSP IP/OBS MODERATE 35: CPT | Performed by: NURSE PRACTITIONER

## 2021-12-05 RX ORDER — SODIUM CHLORIDE 9 MG/ML
25 INJECTION, SOLUTION INTRAVENOUS CONTINUOUS
Status: DISPENSED | OUTPATIENT
Start: 2021-12-05 | End: 2021-12-06

## 2021-12-05 RX ORDER — DIPHENHYDRAMINE HYDROCHLORIDE 50 MG/ML
25 INJECTION INTRAMUSCULAR; INTRAVENOUS ONCE
Status: COMPLETED | OUTPATIENT
Start: 2021-12-05 | End: 2021-12-05

## 2021-12-05 RX ORDER — FENTANYL CITRATE 50 UG/ML
25 INJECTION, SOLUTION INTRAMUSCULAR; INTRAVENOUS ONCE
Status: COMPLETED | OUTPATIENT
Start: 2021-12-05 | End: 2021-12-05

## 2021-12-05 RX ORDER — DIPHENHYDRAMINE HYDROCHLORIDE 50 MG/ML
25 INJECTION INTRAMUSCULAR; INTRAVENOUS EVERY 6 HOURS
Status: DISCONTINUED | OUTPATIENT
Start: 2021-12-05 | End: 2021-12-05

## 2021-12-05 RX ORDER — DIPHENHYDRAMINE HYDROCHLORIDE 50 MG/ML
50 INJECTION INTRAMUSCULAR; INTRAVENOUS EVERY 6 HOURS
Status: DISCONTINUED | OUTPATIENT
Start: 2021-12-05 | End: 2021-12-07 | Stop reason: HOSPADM

## 2021-12-05 RX ADMIN — DIPHENHYDRAMINE HYDROCHLORIDE 50 MG: 50 INJECTION, SOLUTION INTRAMUSCULAR; INTRAVENOUS at 13:09

## 2021-12-05 RX ADMIN — HEPARIN SODIUM 5000 UNITS: 5000 INJECTION INTRAVENOUS; SUBCUTANEOUS at 06:08

## 2021-12-05 RX ADMIN — ONDANSETRON 4 MG: 2 INJECTION INTRAMUSCULAR; INTRAVENOUS at 16:13

## 2021-12-05 RX ADMIN — DIPHENHYDRAMINE HYDROCHLORIDE 25 MG: 50 INJECTION, SOLUTION INTRAMUSCULAR; INTRAVENOUS at 07:23

## 2021-12-05 RX ADMIN — FENTANYL CITRATE 25 MCG: 0.05 INJECTION, SOLUTION INTRAMUSCULAR; INTRAVENOUS at 15:16

## 2021-12-05 RX ADMIN — HEPARIN SODIUM 5000 UNITS: 5000 INJECTION INTRAVENOUS; SUBCUTANEOUS at 13:09

## 2021-12-05 RX ADMIN — DIPHENHYDRAMINE HYDROCHLORIDE 50 MG: 50 INJECTION, SOLUTION INTRAMUSCULAR; INTRAVENOUS at 20:20

## 2021-12-05 RX ADMIN — ONDANSETRON 4 MG: 2 INJECTION INTRAMUSCULAR; INTRAVENOUS at 07:23

## 2021-12-05 RX ADMIN — ONDANSETRON 4 MG: 2 INJECTION INTRAMUSCULAR; INTRAVENOUS at 11:50

## 2021-12-05 RX ADMIN — FAMOTIDINE 20 MG: 10 INJECTION INTRAVENOUS at 22:05

## 2021-12-05 RX ADMIN — METHYLPREDNISOLONE SODIUM SUCCINATE 40 MG: 40 INJECTION, POWDER, FOR SOLUTION INTRAMUSCULAR; INTRAVENOUS at 09:18

## 2021-12-05 RX ADMIN — SODIUM CHLORIDE 25 ML/HR: 0.9 INJECTION, SOLUTION INTRAVENOUS at 10:31

## 2021-12-05 RX ADMIN — LORAZEPAM 1 MG: 2 INJECTION INTRAMUSCULAR; INTRAVENOUS at 22:05

## 2021-12-05 RX ADMIN — LORAZEPAM 1 MG: 2 INJECTION INTRAMUSCULAR; INTRAVENOUS at 16:13

## 2021-12-05 RX ADMIN — LORAZEPAM 1 MG: 2 INJECTION INTRAMUSCULAR; INTRAVENOUS at 03:27

## 2021-12-05 RX ADMIN — LORAZEPAM 1 MG: 2 INJECTION INTRAMUSCULAR; INTRAVENOUS at 09:18

## 2021-12-05 RX ADMIN — ONDANSETRON 4 MG: 2 INJECTION INTRAMUSCULAR; INTRAVENOUS at 20:20

## 2021-12-05 RX ADMIN — FAMOTIDINE 20 MG: 10 INJECTION INTRAVENOUS at 09:18

## 2021-12-05 RX ADMIN — DIPHENHYDRAMINE HYDROCHLORIDE 25 MG: 50 INJECTION, SOLUTION INTRAMUSCULAR; INTRAVENOUS at 01:16

## 2021-12-05 RX ADMIN — ONDANSETRON 4 MG: 2 INJECTION INTRAMUSCULAR; INTRAVENOUS at 03:27

## 2021-12-05 RX ADMIN — HEPARIN SODIUM 5000 UNITS: 5000 INJECTION INTRAVENOUS; SUBCUTANEOUS at 22:05

## 2021-12-05 RX ADMIN — PANTOPRAZOLE SODIUM 40 MG: 40 INJECTION, POWDER, FOR SOLUTION INTRAVENOUS at 09:18

## 2021-12-06 PROBLEM — N20.0 NEPHROLITHIASIS: Status: ACTIVE | Noted: 2021-12-06

## 2021-12-06 PROBLEM — K50.119 CROHN'S DISEASE OF COLON WITH COMPLICATION (HCC): Status: ACTIVE | Noted: 2020-10-17

## 2021-12-06 LAB
ALBUMIN SERPL BCP-MCNC: 3.1 G/DL (ref 3.5–5)
ALP SERPL-CCNC: 46 U/L (ref 46–116)
ALT SERPL W P-5'-P-CCNC: 21 U/L (ref 12–78)
ANION GAP SERPL CALCULATED.3IONS-SCNC: 9 MMOL/L (ref 4–13)
AST SERPL W P-5'-P-CCNC: 12 U/L (ref 5–45)
BASOPHILS # BLD AUTO: 0.1 THOUSANDS/ΜL (ref 0–0.1)
BASOPHILS NFR BLD AUTO: 1 % (ref 0–1)
BILIRUB SERPL-MCNC: 0.26 MG/DL (ref 0.2–1)
BUN SERPL-MCNC: 11 MG/DL (ref 5–25)
CALCIUM ALBUM COR SERPL-MCNC: 9 MG/DL (ref 8.3–10.1)
CALCIUM SERPL-MCNC: 8.3 MG/DL (ref 8.3–10.1)
CHLORIDE SERPL-SCNC: 106 MMOL/L (ref 100–108)
CO2 SERPL-SCNC: 25 MMOL/L (ref 21–32)
CREAT SERPL-MCNC: 0.87 MG/DL (ref 0.6–1.3)
EOSINOPHIL # BLD AUTO: 0.15 THOUSAND/ΜL (ref 0–0.61)
EOSINOPHIL NFR BLD AUTO: 1 % (ref 0–6)
ERYTHROCYTE [DISTWIDTH] IN BLOOD BY AUTOMATED COUNT: 13.5 % (ref 11.6–15.1)
GFR SERPL CREATININE-BSD FRML MDRD: 79 ML/MIN/1.73SQ M
GLUCOSE SERPL-MCNC: 76 MG/DL (ref 65–140)
HCT VFR BLD AUTO: 38.4 % (ref 34.8–46.1)
HGB BLD-MCNC: 12.9 G/DL (ref 11.5–15.4)
IMM GRANULOCYTES # BLD AUTO: 0.05 THOUSAND/UL (ref 0–0.2)
IMM GRANULOCYTES NFR BLD AUTO: 0 % (ref 0–2)
LYMPHOCYTES # BLD AUTO: 4.36 THOUSANDS/ΜL (ref 0.6–4.47)
LYMPHOCYTES NFR BLD AUTO: 36 % (ref 14–44)
MCH RBC QN AUTO: 30.2 PG (ref 26.8–34.3)
MCHC RBC AUTO-ENTMCNC: 33.6 G/DL (ref 31.4–37.4)
MCV RBC AUTO: 90 FL (ref 82–98)
MONOCYTES # BLD AUTO: 0.92 THOUSAND/ΜL (ref 0.17–1.22)
MONOCYTES NFR BLD AUTO: 8 % (ref 4–12)
NEUTROPHILS # BLD AUTO: 6.46 THOUSANDS/ΜL (ref 1.85–7.62)
NEUTS SEG NFR BLD AUTO: 54 % (ref 43–75)
NRBC BLD AUTO-RTO: 0 /100 WBCS
PLATELET # BLD AUTO: 427 THOUSANDS/UL (ref 149–390)
PMV BLD AUTO: 9.2 FL (ref 8.9–12.7)
POTASSIUM SERPL-SCNC: 3 MMOL/L (ref 3.5–5.3)
PROT SERPL-MCNC: 6.6 G/DL (ref 6.4–8.2)
RBC # BLD AUTO: 4.27 MILLION/UL (ref 3.81–5.12)
SODIUM SERPL-SCNC: 140 MMOL/L (ref 136–145)
WBC # BLD AUTO: 12.04 THOUSAND/UL (ref 4.31–10.16)

## 2021-12-06 PROCEDURE — 99233 SBSQ HOSP IP/OBS HIGH 50: CPT | Performed by: INTERNAL MEDICINE

## 2021-12-06 PROCEDURE — C9113 INJ PANTOPRAZOLE SODIUM, VIA: HCPCS | Performed by: PHYSICIAN ASSISTANT

## 2021-12-06 PROCEDURE — 80053 COMPREHEN METABOLIC PANEL: CPT | Performed by: NURSE PRACTITIONER

## 2021-12-06 PROCEDURE — 85025 COMPLETE CBC W/AUTO DIFF WBC: CPT | Performed by: NURSE PRACTITIONER

## 2021-12-06 RX ORDER — POTASSIUM CHLORIDE 14.9 MG/ML
20 INJECTION INTRAVENOUS
Status: COMPLETED | OUTPATIENT
Start: 2021-12-06 | End: 2021-12-06

## 2021-12-06 RX ORDER — POTASSIUM CHLORIDE 20 MEQ/1
40 TABLET, EXTENDED RELEASE ORAL ONCE
Status: DISCONTINUED | OUTPATIENT
Start: 2021-12-06 | End: 2021-12-06

## 2021-12-06 RX ORDER — POTASSIUM CHLORIDE 14.9 MG/ML
20 INJECTION INTRAVENOUS
Status: COMPLETED | OUTPATIENT
Start: 2021-12-06 | End: 2021-12-07

## 2021-12-06 RX ORDER — LEVOTHYROXINE SODIUM 0.05 MG/1
50 TABLET ORAL
Status: DISCONTINUED | OUTPATIENT
Start: 2021-12-07 | End: 2021-12-07 | Stop reason: HOSPADM

## 2021-12-06 RX ADMIN — HEPARIN SODIUM 5000 UNITS: 5000 INJECTION INTRAVENOUS; SUBCUTANEOUS at 14:00

## 2021-12-06 RX ADMIN — FAMOTIDINE 20 MG: 10 INJECTION INTRAVENOUS at 09:21

## 2021-12-06 RX ADMIN — HEPARIN SODIUM 5000 UNITS: 5000 INJECTION INTRAVENOUS; SUBCUTANEOUS at 22:57

## 2021-12-06 RX ADMIN — FAMOTIDINE 20 MG: 10 INJECTION INTRAVENOUS at 20:20

## 2021-12-06 RX ADMIN — LORAZEPAM 1 MG: 2 INJECTION INTRAMUSCULAR; INTRAVENOUS at 09:20

## 2021-12-06 RX ADMIN — LORAZEPAM 1 MG: 2 INJECTION INTRAMUSCULAR; INTRAVENOUS at 22:57

## 2021-12-06 RX ADMIN — ONDANSETRON 4 MG: 2 INJECTION INTRAMUSCULAR; INTRAVENOUS at 12:08

## 2021-12-06 RX ADMIN — DIPHENHYDRAMINE HYDROCHLORIDE 50 MG: 50 INJECTION, SOLUTION INTRAMUSCULAR; INTRAVENOUS at 20:18

## 2021-12-06 RX ADMIN — PANTOPRAZOLE SODIUM 40 MG: 40 INJECTION, POWDER, FOR SOLUTION INTRAVENOUS at 09:20

## 2021-12-06 RX ADMIN — LORAZEPAM 1 MG: 2 INJECTION INTRAMUSCULAR; INTRAVENOUS at 15:57

## 2021-12-06 RX ADMIN — ONDANSETRON 4 MG: 2 INJECTION INTRAMUSCULAR; INTRAVENOUS at 15:57

## 2021-12-06 RX ADMIN — ONDANSETRON 4 MG: 2 INJECTION INTRAMUSCULAR; INTRAVENOUS at 02:20

## 2021-12-06 RX ADMIN — POTASSIUM CHLORIDE 20 MEQ: 200 INJECTION, SOLUTION INTRAVENOUS at 14:00

## 2021-12-06 RX ADMIN — POTASSIUM CHLORIDE 20 MEQ: 200 INJECTION, SOLUTION INTRAVENOUS at 23:06

## 2021-12-06 RX ADMIN — DIPHENHYDRAMINE HYDROCHLORIDE 50 MG: 50 INJECTION, SOLUTION INTRAMUSCULAR; INTRAVENOUS at 14:00

## 2021-12-06 RX ADMIN — DIPHENHYDRAMINE HYDROCHLORIDE 50 MG: 50 INJECTION, SOLUTION INTRAMUSCULAR; INTRAVENOUS at 07:10

## 2021-12-06 RX ADMIN — ONDANSETRON 4 MG: 2 INJECTION INTRAMUSCULAR; INTRAVENOUS at 20:19

## 2021-12-06 RX ADMIN — HEPARIN SODIUM 5000 UNITS: 5000 INJECTION INTRAVENOUS; SUBCUTANEOUS at 07:10

## 2021-12-06 RX ADMIN — METHYLPREDNISOLONE SODIUM SUCCINATE 40 MG: 40 INJECTION, POWDER, FOR SOLUTION INTRAMUSCULAR; INTRAVENOUS at 09:20

## 2021-12-06 RX ADMIN — ONDANSETRON 4 MG: 2 INJECTION INTRAMUSCULAR; INTRAVENOUS at 07:10

## 2021-12-06 RX ADMIN — LORAZEPAM 1 MG: 2 INJECTION INTRAMUSCULAR; INTRAVENOUS at 04:17

## 2021-12-06 RX ADMIN — POTASSIUM CHLORIDE 20 MEQ: 200 INJECTION, SOLUTION INTRAVENOUS at 15:57

## 2021-12-06 RX ADMIN — DIPHENHYDRAMINE HYDROCHLORIDE 50 MG: 50 INJECTION, SOLUTION INTRAMUSCULAR; INTRAVENOUS at 02:20

## 2021-12-06 RX ADMIN — POTASSIUM CHLORIDE 20 MEQ: 200 INJECTION, SOLUTION INTRAVENOUS at 20:21

## 2021-12-07 VITALS
SYSTOLIC BLOOD PRESSURE: 95 MMHG | OXYGEN SATURATION: 96 % | BODY MASS INDEX: 25.09 KG/M2 | DIASTOLIC BLOOD PRESSURE: 60 MMHG | RESPIRATION RATE: 18 BRPM | TEMPERATURE: 97.4 F | HEIGHT: 65 IN | WEIGHT: 150.57 LBS | HEART RATE: 79 BPM

## 2021-12-07 LAB
ANION GAP SERPL CALCULATED.3IONS-SCNC: 10 MMOL/L (ref 4–13)
ATRIAL RATE: 88 BPM
BASOPHILS # BLD AUTO: 0.08 THOUSANDS/ΜL (ref 0–0.1)
BASOPHILS NFR BLD AUTO: 1 % (ref 0–1)
BUN SERPL-MCNC: 9 MG/DL (ref 5–25)
CALCIUM SERPL-MCNC: 8.8 MG/DL (ref 8.3–10.1)
CHLORIDE SERPL-SCNC: 105 MMOL/L (ref 100–108)
CO2 SERPL-SCNC: 25 MMOL/L (ref 21–32)
CREAT SERPL-MCNC: 0.85 MG/DL (ref 0.6–1.3)
EOSINOPHIL # BLD AUTO: 0.22 THOUSAND/ΜL (ref 0–0.61)
EOSINOPHIL NFR BLD AUTO: 2 % (ref 0–6)
ERYTHROCYTE [DISTWIDTH] IN BLOOD BY AUTOMATED COUNT: 13.9 % (ref 11.6–15.1)
GFR SERPL CREATININE-BSD FRML MDRD: 81 ML/MIN/1.73SQ M
GLUCOSE SERPL-MCNC: 87 MG/DL (ref 65–140)
HCT VFR BLD AUTO: 40.9 % (ref 34.8–46.1)
HGB BLD-MCNC: 13.5 G/DL (ref 11.5–15.4)
IMM GRANULOCYTES # BLD AUTO: 0.06 THOUSAND/UL (ref 0–0.2)
IMM GRANULOCYTES NFR BLD AUTO: 1 % (ref 0–2)
LYMPHOCYTES # BLD AUTO: 4.13 THOUSANDS/ΜL (ref 0.6–4.47)
LYMPHOCYTES NFR BLD AUTO: 34 % (ref 14–44)
MAGNESIUM SERPL-MCNC: 2.1 MG/DL (ref 1.6–2.6)
MCH RBC QN AUTO: 30.3 PG (ref 26.8–34.3)
MCHC RBC AUTO-ENTMCNC: 33 G/DL (ref 31.4–37.4)
MCV RBC AUTO: 92 FL (ref 82–98)
MONOCYTES # BLD AUTO: 1.02 THOUSAND/ΜL (ref 0.17–1.22)
MONOCYTES NFR BLD AUTO: 8 % (ref 4–12)
NEUTROPHILS # BLD AUTO: 6.6 THOUSANDS/ΜL (ref 1.85–7.62)
NEUTS SEG NFR BLD AUTO: 54 % (ref 43–75)
NRBC BLD AUTO-RTO: 0 /100 WBCS
P AXIS: 34 DEGREES
PLATELET # BLD AUTO: 462 THOUSANDS/UL (ref 149–390)
PMV BLD AUTO: 9.6 FL (ref 8.9–12.7)
POTASSIUM SERPL-SCNC: 3.5 MMOL/L (ref 3.5–5.3)
PR INTERVAL: 108 MS
QRS AXIS: 59 DEGREES
QRSD INTERVAL: 80 MS
QT INTERVAL: 402 MS
QTC INTERVAL: 486 MS
RBC # BLD AUTO: 4.46 MILLION/UL (ref 3.81–5.12)
SODIUM SERPL-SCNC: 140 MMOL/L (ref 136–145)
T WAVE AXIS: 50 DEGREES
VENTRICULAR RATE: 88 BPM
WBC # BLD AUTO: 12.11 THOUSAND/UL (ref 4.31–10.16)

## 2021-12-07 PROCEDURE — NC001 PR NO CHARGE: Performed by: PHYSICIAN ASSISTANT

## 2021-12-07 PROCEDURE — 80048 BASIC METABOLIC PNL TOTAL CA: CPT | Performed by: INTERNAL MEDICINE

## 2021-12-07 PROCEDURE — 93010 ELECTROCARDIOGRAM REPORT: CPT | Performed by: INTERNAL MEDICINE

## 2021-12-07 PROCEDURE — 85025 COMPLETE CBC W/AUTO DIFF WBC: CPT | Performed by: INTERNAL MEDICINE

## 2021-12-07 PROCEDURE — 99239 HOSP IP/OBS DSCHRG MGMT >30: CPT | Performed by: PHYSICIAN ASSISTANT

## 2021-12-07 PROCEDURE — 83735 ASSAY OF MAGNESIUM: CPT | Performed by: INTERNAL MEDICINE

## 2021-12-07 PROCEDURE — C9113 INJ PANTOPRAZOLE SODIUM, VIA: HCPCS | Performed by: PHYSICIAN ASSISTANT

## 2021-12-07 PROCEDURE — 99232 SBSQ HOSP IP/OBS MODERATE 35: CPT | Performed by: NURSE PRACTITIONER

## 2021-12-07 RX ADMIN — ONDANSETRON 4 MG: 2 INJECTION INTRAMUSCULAR; INTRAVENOUS at 12:59

## 2021-12-07 RX ADMIN — PANTOPRAZOLE SODIUM 40 MG: 40 INJECTION, POWDER, FOR SOLUTION INTRAVENOUS at 09:05

## 2021-12-07 RX ADMIN — ONDANSETRON 4 MG: 2 INJECTION INTRAMUSCULAR; INTRAVENOUS at 09:05

## 2021-12-07 RX ADMIN — HEPARIN SODIUM 5000 UNITS: 5000 INJECTION INTRAVENOUS; SUBCUTANEOUS at 05:03

## 2021-12-07 RX ADMIN — ONDANSETRON 4 MG: 2 INJECTION INTRAMUSCULAR; INTRAVENOUS at 01:13

## 2021-12-07 RX ADMIN — ONDANSETRON 4 MG: 2 INJECTION INTRAMUSCULAR; INTRAVENOUS at 05:02

## 2021-12-07 RX ADMIN — FAMOTIDINE 20 MG: 10 INJECTION INTRAVENOUS at 09:05

## 2021-12-07 RX ADMIN — DIPHENHYDRAMINE HYDROCHLORIDE 50 MG: 50 INJECTION, SOLUTION INTRAMUSCULAR; INTRAVENOUS at 09:08

## 2021-12-07 RX ADMIN — LORAZEPAM 1 MG: 2 INJECTION INTRAMUSCULAR; INTRAVENOUS at 09:05

## 2021-12-07 RX ADMIN — DIPHENHYDRAMINE HYDROCHLORIDE 50 MG: 50 INJECTION, SOLUTION INTRAMUSCULAR; INTRAVENOUS at 12:59

## 2021-12-07 RX ADMIN — LORAZEPAM 1 MG: 2 INJECTION INTRAMUSCULAR; INTRAVENOUS at 05:02

## 2021-12-07 RX ADMIN — ONDANSETRON 4 MG: 2 INJECTION INTRAMUSCULAR; INTRAVENOUS at 17:33

## 2021-12-07 RX ADMIN — DIPHENHYDRAMINE HYDROCHLORIDE 50 MG: 50 INJECTION, SOLUTION INTRAMUSCULAR; INTRAVENOUS at 02:15

## 2021-12-07 RX ADMIN — HEPARIN SODIUM 5000 UNITS: 5000 INJECTION INTRAVENOUS; SUBCUTANEOUS at 13:00

## 2021-12-07 RX ADMIN — LORAZEPAM 1 MG: 2 INJECTION INTRAMUSCULAR; INTRAVENOUS at 15:46

## 2021-12-08 ENCOUNTER — TELEPHONE (OUTPATIENT)
Dept: GASTROENTEROLOGY | Facility: CLINIC | Age: 46
End: 2021-12-08

## 2021-12-09 ENCOUNTER — HOSPITAL ENCOUNTER (OUTPATIENT)
Dept: INFUSION CENTER | Facility: CLINIC | Age: 46
Discharge: HOME | End: 2021-12-09
Payer: COMMERCIAL

## 2021-12-09 ENCOUNTER — TRANSITIONAL CARE MANAGEMENT (OUTPATIENT)
Dept: FAMILY MEDICINE CLINIC | Facility: HOME HEALTHCARE | Age: 46
End: 2021-12-09

## 2021-12-09 ENCOUNTER — TELEPHONE (OUTPATIENT)
Dept: GASTROENTEROLOGY | Facility: CLINIC | Age: 46
End: 2021-12-09

## 2021-12-09 VITALS
SYSTOLIC BLOOD PRESSURE: 111 MMHG | DIASTOLIC BLOOD PRESSURE: 76 MMHG | RESPIRATION RATE: 18 BRPM | HEART RATE: 106 BPM | TEMPERATURE: 99.3 F

## 2021-12-09 DIAGNOSIS — R11.0 NAUSEA: ICD-10-CM

## 2021-12-09 DIAGNOSIS — E44.1 MILD PROTEIN-CALORIE MALNUTRITION (HCC): ICD-10-CM

## 2021-12-09 DIAGNOSIS — R19.7 DIARRHEA, UNSPECIFIED TYPE: ICD-10-CM

## 2021-12-09 DIAGNOSIS — K52.9 INFLAMMATORY BOWEL DISEASE: ICD-10-CM

## 2021-12-09 DIAGNOSIS — R63.0 ANOREXIA: Primary | ICD-10-CM

## 2021-12-09 PROCEDURE — 96361 HYDRATE IV INFUSION ADD-ON: CPT

## 2021-12-09 PROCEDURE — 96360 HYDRATION IV INFUSION INIT: CPT

## 2021-12-09 RX ORDER — FAMOTIDINE 40 MG/1
40 TABLET, FILM COATED ORAL DAILY
Qty: 30 TABLET | Refills: 3 | Status: SHIPPED | OUTPATIENT
Start: 2021-12-09 | End: 2022-04-22 | Stop reason: ALTCHOICE

## 2021-12-09 RX ORDER — PANTOPRAZOLE SODIUM 40 MG/1
40 TABLET, DELAYED RELEASE ORAL DAILY
Qty: 30 TABLET | Refills: 3 | Status: SHIPPED | OUTPATIENT
Start: 2021-12-09 | End: 2022-03-28 | Stop reason: SDUPTHER

## 2021-12-09 RX ORDER — SODIUM CHLORIDE, SODIUM LACTATE, POTASSIUM CHLORIDE, CALCIUM CHLORIDE 600; 310; 30; 20 MG/100ML; MG/100ML; MG/100ML; MG/100ML
1000 INJECTION, SOLUTION INTRAVENOUS ONCE
Status: CANCELLED
Start: 2021-12-15 | End: 2021-12-15

## 2021-12-09 RX ORDER — SODIUM CHLORIDE, SODIUM LACTATE, POTASSIUM CHLORIDE, CALCIUM CHLORIDE 600; 310; 30; 20 MG/100ML; MG/100ML; MG/100ML; MG/100ML
1000 INJECTION, SOLUTION INTRAVENOUS ONCE
Status: COMPLETED | OUTPATIENT
Start: 2021-12-09 | End: 2021-12-09

## 2021-12-09 RX ORDER — SODIUM CHLORIDE, SODIUM LACTATE, POTASSIUM CHLORIDE, CALCIUM CHLORIDE 600; 310; 30; 20 MG/100ML; MG/100ML; MG/100ML; MG/100ML
1500 INJECTION, SOLUTION INTRAVENOUS ONCE
Status: CANCELLED
Start: 2021-12-16 | End: 2021-12-16

## 2021-12-09 RX ORDER — ONDANSETRON 4 MG/1
4 TABLET, ORALLY DISINTEGRATING ORAL EVERY 6 HOURS PRN
Qty: 20 TABLET | Refills: 0 | Status: SHIPPED | OUTPATIENT
Start: 2021-12-09

## 2021-12-09 RX ADMIN — SODIUM CHLORIDE, SODIUM LACTATE, POTASSIUM CHLORIDE, AND CALCIUM CHLORIDE 1000 ML: .6; .31; .03; .02 INJECTION, SOLUTION INTRAVENOUS at 11:50

## 2021-12-16 ENCOUNTER — HOSPITAL ENCOUNTER (OUTPATIENT)
Dept: INFUSION CENTER | Facility: CLINIC | Age: 46
Discharge: HOME | End: 2021-12-16

## 2021-12-16 ENCOUNTER — TELEPHONE (OUTPATIENT)
Dept: GASTROENTEROLOGY | Facility: MEDICAL CENTER | Age: 46
End: 2021-12-16

## 2021-12-22 ENCOUNTER — TELEPHONE (OUTPATIENT)
Dept: GASTROENTEROLOGY | Facility: MEDICAL CENTER | Age: 46
End: 2021-12-22

## 2021-12-22 DIAGNOSIS — R63.0 ANOREXIA: Primary | ICD-10-CM

## 2021-12-22 DIAGNOSIS — R19.7 DIARRHEA, UNSPECIFIED TYPE: ICD-10-CM

## 2021-12-22 DIAGNOSIS — E44.1 MILD PROTEIN-CALORIE MALNUTRITION (HCC): ICD-10-CM

## 2021-12-22 RX ORDER — SODIUM CHLORIDE, SODIUM LACTATE, POTASSIUM CHLORIDE, CALCIUM CHLORIDE 600; 310; 30; 20 MG/100ML; MG/100ML; MG/100ML; MG/100ML
1500 INJECTION, SOLUTION INTRAVENOUS ONCE
Status: CANCELLED
Start: 2021-12-22 | End: 2021-12-22

## 2021-12-27 ENCOUNTER — TELEPHONE (OUTPATIENT)
Dept: GASTROENTEROLOGY | Facility: MEDICAL CENTER | Age: 46
End: 2021-12-27

## 2021-12-27 ENCOUNTER — TELEMEDICINE (OUTPATIENT)
Dept: FAMILY MEDICINE CLINIC | Facility: CLINIC | Age: 46
End: 2021-12-27
Payer: MEDICARE

## 2021-12-27 DIAGNOSIS — U07.1 COVID-19: Primary | ICD-10-CM

## 2021-12-27 PROCEDURE — G0071 COMM SVCS BY RHC/FQHC 5 MIN: HCPCS | Performed by: FAMILY MEDICINE

## 2021-12-27 RX ORDER — METHYLPREDNISOLONE SODIUM SUCCINATE 125 MG/2ML
60 INJECTION, POWDER, LYOPHILIZED, FOR SOLUTION INTRAMUSCULAR; INTRAVENOUS ONCE
Status: CANCELLED
Start: 2021-12-27 | End: 2021-12-27

## 2021-12-28 ENCOUNTER — HOSPITAL ENCOUNTER (OUTPATIENT)
Dept: INFUSION CENTER | Facility: CLINIC | Age: 46
Discharge: HOME | End: 2021-12-28
Payer: COMMERCIAL

## 2021-12-28 VITALS
TEMPERATURE: 97.6 F | RESPIRATION RATE: 18 BRPM | DIASTOLIC BLOOD PRESSURE: 84 MMHG | HEART RATE: 108 BPM | SYSTOLIC BLOOD PRESSURE: 124 MMHG

## 2021-12-28 DIAGNOSIS — R63.0 ANOREXIA: Primary | ICD-10-CM

## 2021-12-28 DIAGNOSIS — E44.1 MILD PROTEIN-CALORIE MALNUTRITION (HCC): ICD-10-CM

## 2021-12-28 DIAGNOSIS — R19.7 DIARRHEA, UNSPECIFIED TYPE: ICD-10-CM

## 2021-12-28 PROCEDURE — 96361 HYDRATE IV INFUSION ADD-ON: CPT

## 2021-12-28 PROCEDURE — 96375 TX/PRO/DX INJ NEW DRUG ADDON: CPT

## 2021-12-28 PROCEDURE — 96365 THER/PROPH/DIAG IV INF INIT: CPT

## 2021-12-28 RX ORDER — SODIUM CHLORIDE, SODIUM LACTATE, POTASSIUM CHLORIDE, CALCIUM CHLORIDE 600; 310; 30; 20 MG/100ML; MG/100ML; MG/100ML; MG/100ML
1500 INJECTION, SOLUTION INTRAVENOUS ONCE
Status: COMPLETED | OUTPATIENT
Start: 2021-12-28 | End: 2021-12-28

## 2021-12-28 RX ORDER — SODIUM CHLORIDE, SODIUM LACTATE, POTASSIUM CHLORIDE, CALCIUM CHLORIDE 600; 310; 30; 20 MG/100ML; MG/100ML; MG/100ML; MG/100ML
1500 INJECTION, SOLUTION INTRAVENOUS ONCE
Status: CANCELLED
Start: 2021-12-31 | End: 2021-12-31

## 2021-12-28 RX ORDER — METHYLPREDNISOLONE SODIUM SUCCINATE 125 MG/2ML
60 INJECTION, POWDER, LYOPHILIZED, FOR SOLUTION INTRAMUSCULAR; INTRAVENOUS ONCE
Status: COMPLETED | OUTPATIENT
Start: 2021-12-28 | End: 2021-12-28

## 2021-12-28 RX ORDER — METHYLPREDNISOLONE SODIUM SUCCINATE 125 MG/2ML
60 INJECTION, POWDER, LYOPHILIZED, FOR SOLUTION INTRAMUSCULAR; INTRAVENOUS ONCE
Status: CANCELLED
Start: 2022-01-04 | End: 2021-12-31

## 2021-12-28 RX ADMIN — METHYLPREDNISOLONE SODIUM SUCCINATE 60 MG: 125 INJECTION, POWDER, FOR SOLUTION INTRAMUSCULAR; INTRAVENOUS at 12:15

## 2021-12-28 RX ADMIN — SODIUM CHLORIDE, SODIUM LACTATE, POTASSIUM CHLORIDE, AND CALCIUM CHLORIDE 1500 ML: .6; .31; .03; .02 INJECTION, SOLUTION INTRAVENOUS at 12:45

## 2021-12-28 RX ADMIN — DIPHENHYDRAMINE HYDROCHLORIDE 50 MG: 50 INJECTION, SOLUTION INTRAMUSCULAR; INTRAVENOUS at 12:25

## 2021-12-28 NOTE — PROGRESS NOTES
Pt arrived to unit without complaint  Pt tolerated treatment without incident  AVS declined, but aware of next appt on Thursday  Pt left unit in stable condition

## 2021-12-29 DIAGNOSIS — F51.04 PSYCHOPHYSIOLOGICAL INSOMNIA: ICD-10-CM

## 2021-12-29 RX ORDER — TEMAZEPAM 30 MG/1
30 CAPSULE ORAL
Qty: 30 CAPSULE | Refills: 5 | Status: SHIPPED | OUTPATIENT
Start: 2021-12-29 | End: 2022-04-22 | Stop reason: ALTCHOICE

## 2021-12-30 ENCOUNTER — HOSPITAL ENCOUNTER (OUTPATIENT)
Dept: INFUSION CENTER | Facility: CLINIC | Age: 46
End: 2021-12-30

## 2022-01-04 ENCOUNTER — HOSPITAL ENCOUNTER (OUTPATIENT)
Dept: INFUSION CENTER | Facility: HOSPITAL | Age: 47
Discharge: HOME | End: 2022-01-04
Attending: INTERNAL MEDICINE
Payer: COMMERCIAL

## 2022-01-04 VITALS
OXYGEN SATURATION: 99 % | RESPIRATION RATE: 18 BRPM | SYSTOLIC BLOOD PRESSURE: 112 MMHG | DIASTOLIC BLOOD PRESSURE: 81 MMHG | HEART RATE: 116 BPM | TEMPERATURE: 97.8 F

## 2022-01-04 DIAGNOSIS — E44.1 MILD PROTEIN-CALORIE MALNUTRITION (HCC): ICD-10-CM

## 2022-01-04 DIAGNOSIS — R19.7 DIARRHEA, UNSPECIFIED TYPE: ICD-10-CM

## 2022-01-04 DIAGNOSIS — R63.0 ANOREXIA: Primary | ICD-10-CM

## 2022-01-04 PROCEDURE — 96361 HYDRATE IV INFUSION ADD-ON: CPT

## 2022-01-04 PROCEDURE — 96365 THER/PROPH/DIAG IV INF INIT: CPT

## 2022-01-04 PROCEDURE — 96375 TX/PRO/DX INJ NEW DRUG ADDON: CPT

## 2022-01-04 RX ORDER — SODIUM CHLORIDE, SODIUM LACTATE, POTASSIUM CHLORIDE, CALCIUM CHLORIDE 600; 310; 30; 20 MG/100ML; MG/100ML; MG/100ML; MG/100ML
1500 INJECTION, SOLUTION INTRAVENOUS ONCE
Status: COMPLETED | OUTPATIENT
Start: 2022-01-04 | End: 2022-01-04

## 2022-01-04 RX ORDER — METHYLPREDNISOLONE SODIUM SUCCINATE 125 MG/2ML
60 INJECTION, POWDER, LYOPHILIZED, FOR SOLUTION INTRAMUSCULAR; INTRAVENOUS ONCE
Status: CANCELLED
Start: 2022-01-11 | End: 2022-01-06

## 2022-01-04 RX ORDER — METHYLPREDNISOLONE SODIUM SUCCINATE 125 MG/2ML
60 INJECTION, POWDER, LYOPHILIZED, FOR SOLUTION INTRAMUSCULAR; INTRAVENOUS ONCE
Status: COMPLETED | OUTPATIENT
Start: 2022-01-04 | End: 2022-01-04

## 2022-01-04 RX ORDER — SODIUM CHLORIDE, SODIUM LACTATE, POTASSIUM CHLORIDE, CALCIUM CHLORIDE 600; 310; 30; 20 MG/100ML; MG/100ML; MG/100ML; MG/100ML
1500 INJECTION, SOLUTION INTRAVENOUS ONCE
Status: CANCELLED
Start: 2022-01-06 | End: 2022-01-06

## 2022-01-04 RX ADMIN — METHYLPREDNISOLONE SODIUM SUCCINATE 60 MG: 125 INJECTION, POWDER, FOR SOLUTION INTRAMUSCULAR; INTRAVENOUS at 12:04

## 2022-01-04 RX ADMIN — DIPHENHYDRAMINE HYDROCHLORIDE 50 MG: 50 INJECTION INTRAMUSCULAR; INTRAVENOUS at 11:40

## 2022-01-04 RX ADMIN — SODIUM CHLORIDE, SODIUM LACTATE, POTASSIUM CHLORIDE, AND CALCIUM CHLORIDE 1500 ML: .6; .31; .03; .02 INJECTION, SOLUTION INTRAVENOUS at 12:08

## 2022-01-04 NOTE — PROGRESS NOTES
Patient tolerated premeds and IV Lactated ringers without reaction or issues  Patient declined AVS   Patient aware of next appointment  Patient ambulated off unit without incident  All personal belongings taken with patient

## 2022-01-06 ENCOUNTER — HOSPITAL ENCOUNTER (OUTPATIENT)
Dept: INFUSION CENTER | Facility: CLINIC | Age: 47
Discharge: HOME | End: 2022-01-06
Payer: COMMERCIAL

## 2022-01-06 VITALS
SYSTOLIC BLOOD PRESSURE: 135 MMHG | HEART RATE: 82 BPM | RESPIRATION RATE: 18 BRPM | TEMPERATURE: 97.7 F | DIASTOLIC BLOOD PRESSURE: 78 MMHG

## 2022-01-06 DIAGNOSIS — E44.1 MILD PROTEIN-CALORIE MALNUTRITION (HCC): ICD-10-CM

## 2022-01-06 DIAGNOSIS — R63.0 ANOREXIA: Primary | ICD-10-CM

## 2022-01-06 DIAGNOSIS — R19.7 DIARRHEA, UNSPECIFIED TYPE: ICD-10-CM

## 2022-01-06 PROCEDURE — 96360 HYDRATION IV INFUSION INIT: CPT

## 2022-01-06 PROCEDURE — 96361 HYDRATE IV INFUSION ADD-ON: CPT

## 2022-01-06 RX ORDER — SODIUM CHLORIDE, SODIUM LACTATE, POTASSIUM CHLORIDE, CALCIUM CHLORIDE 600; 310; 30; 20 MG/100ML; MG/100ML; MG/100ML; MG/100ML
1500 INJECTION, SOLUTION INTRAVENOUS ONCE
Status: CANCELLED
Start: 2022-01-07 | End: 2022-01-07

## 2022-01-06 RX ORDER — METHYLPREDNISOLONE SODIUM SUCCINATE 125 MG/2ML
60 INJECTION, POWDER, LYOPHILIZED, FOR SOLUTION INTRAMUSCULAR; INTRAVENOUS ONCE
Status: CANCELLED
Start: 2022-01-11 | End: 2022-01-07

## 2022-01-06 RX ORDER — SODIUM CHLORIDE, SODIUM LACTATE, POTASSIUM CHLORIDE, CALCIUM CHLORIDE 600; 310; 30; 20 MG/100ML; MG/100ML; MG/100ML; MG/100ML
1500 INJECTION, SOLUTION INTRAVENOUS ONCE
Status: COMPLETED | OUTPATIENT
Start: 2022-01-06 | End: 2022-01-06

## 2022-01-06 RX ADMIN — SODIUM CHLORIDE, SODIUM LACTATE, POTASSIUM CHLORIDE, AND CALCIUM CHLORIDE 1500 ML: .6; .31; .03; .02 INJECTION, SOLUTION INTRAVENOUS at 11:40

## 2022-01-06 NOTE — PROGRESS NOTES
Patient arrives to infusion center for LR 1500 mL hydration over 3 hours today  Patient reports she feels better today than she did at the beginning of the week  PIV inserted without difficulty to L hand, hydration in progress  Patient resting on recliner chair, call bell within reach

## 2022-01-06 NOTE — PROGRESS NOTES
Patient tolerated treatment well today without issue  PIV removed without issue, coband wrap in place  Patient aware of next appts, AVS printed and provided  Patient AAOx4 and ambulatory upon DC without gait disturbance noted

## 2022-01-11 ENCOUNTER — HOSPITAL ENCOUNTER (OUTPATIENT)
Dept: INFUSION CENTER | Facility: CLINIC | Age: 47
Discharge: HOME | End: 2022-01-11
Payer: COMMERCIAL

## 2022-01-11 VITALS
RESPIRATION RATE: 18 BRPM | DIASTOLIC BLOOD PRESSURE: 77 MMHG | HEART RATE: 99 BPM | SYSTOLIC BLOOD PRESSURE: 113 MMHG | TEMPERATURE: 97.9 F

## 2022-01-11 DIAGNOSIS — R63.0 ANOREXIA: Primary | ICD-10-CM

## 2022-01-11 DIAGNOSIS — R19.7 DIARRHEA, UNSPECIFIED TYPE: ICD-10-CM

## 2022-01-11 DIAGNOSIS — E44.1 MILD PROTEIN-CALORIE MALNUTRITION (HCC): ICD-10-CM

## 2022-01-11 PROCEDURE — 96374 THER/PROPH/DIAG INJ IV PUSH: CPT

## 2022-01-11 PROCEDURE — 96375 TX/PRO/DX INJ NEW DRUG ADDON: CPT

## 2022-01-11 PROCEDURE — 96361 HYDRATE IV INFUSION ADD-ON: CPT

## 2022-01-11 RX ORDER — SODIUM CHLORIDE, SODIUM LACTATE, POTASSIUM CHLORIDE, CALCIUM CHLORIDE 600; 310; 30; 20 MG/100ML; MG/100ML; MG/100ML; MG/100ML
1500 INJECTION, SOLUTION INTRAVENOUS ONCE
Status: CANCELLED
Start: 2022-01-13 | End: 2022-01-12

## 2022-01-11 RX ORDER — SODIUM CHLORIDE, SODIUM LACTATE, POTASSIUM CHLORIDE, CALCIUM CHLORIDE 600; 310; 30; 20 MG/100ML; MG/100ML; MG/100ML; MG/100ML
1500 INJECTION, SOLUTION INTRAVENOUS ONCE
Status: COMPLETED | OUTPATIENT
Start: 2022-01-11 | End: 2022-01-11

## 2022-01-11 RX ORDER — METHYLPREDNISOLONE SODIUM SUCCINATE 125 MG/2ML
60 INJECTION, POWDER, LYOPHILIZED, FOR SOLUTION INTRAMUSCULAR; INTRAVENOUS ONCE
Status: CANCELLED
Start: 2022-01-18 | End: 2022-01-12

## 2022-01-11 RX ORDER — METHYLPREDNISOLONE SODIUM SUCCINATE 125 MG/2ML
60 INJECTION, POWDER, LYOPHILIZED, FOR SOLUTION INTRAMUSCULAR; INTRAVENOUS ONCE
Status: COMPLETED | OUTPATIENT
Start: 2022-01-11 | End: 2022-01-11

## 2022-01-11 RX ADMIN — METHYLPREDNISOLONE SODIUM SUCCINATE 60 MG: 125 INJECTION, POWDER, FOR SOLUTION INTRAMUSCULAR; INTRAVENOUS at 12:13

## 2022-01-11 RX ADMIN — SODIUM CHLORIDE, SODIUM LACTATE, POTASSIUM CHLORIDE, AND CALCIUM CHLORIDE 1500 ML: .6; .31; .03; .02 INJECTION, SOLUTION INTRAVENOUS at 12:12

## 2022-01-11 RX ADMIN — DIPHENHYDRAMINE HYDROCHLORIDE 50 MG: 50 INJECTION, SOLUTION INTRAMUSCULAR; INTRAVENOUS at 11:58

## 2022-01-11 NOTE — PROGRESS NOTES
Pt arrived to unit without complaint, tolerated IV hydration and premeds for her self administered IVIGG sq infusion  Pt left unit in stable condition without question or concern

## 2022-01-13 ENCOUNTER — HOSPITAL ENCOUNTER (OUTPATIENT)
Dept: INFUSION CENTER | Facility: CLINIC | Age: 47
Discharge: HOME | End: 2022-01-13
Payer: COMMERCIAL

## 2022-01-13 VITALS
RESPIRATION RATE: 18 BRPM | TEMPERATURE: 99.1 F | SYSTOLIC BLOOD PRESSURE: 154 MMHG | HEART RATE: 94 BPM | DIASTOLIC BLOOD PRESSURE: 89 MMHG

## 2022-01-13 DIAGNOSIS — R63.0 ANOREXIA: Primary | ICD-10-CM

## 2022-01-13 DIAGNOSIS — E44.1 MILD PROTEIN-CALORIE MALNUTRITION (HCC): ICD-10-CM

## 2022-01-13 DIAGNOSIS — R19.7 DIARRHEA, UNSPECIFIED TYPE: ICD-10-CM

## 2022-01-13 PROCEDURE — 96360 HYDRATION IV INFUSION INIT: CPT

## 2022-01-13 PROCEDURE — 96361 HYDRATE IV INFUSION ADD-ON: CPT

## 2022-01-13 RX ORDER — SODIUM CHLORIDE, SODIUM LACTATE, POTASSIUM CHLORIDE, CALCIUM CHLORIDE 600; 310; 30; 20 MG/100ML; MG/100ML; MG/100ML; MG/100ML
1500 INJECTION, SOLUTION INTRAVENOUS ONCE
Status: COMPLETED | OUTPATIENT
Start: 2022-01-13 | End: 2022-01-13

## 2022-01-13 RX ORDER — METHYLPREDNISOLONE SODIUM SUCCINATE 125 MG/2ML
60 INJECTION, POWDER, LYOPHILIZED, FOR SOLUTION INTRAMUSCULAR; INTRAVENOUS ONCE
Status: CANCELLED
Start: 2022-01-18 | End: 2022-01-14

## 2022-01-13 RX ORDER — SODIUM CHLORIDE, SODIUM LACTATE, POTASSIUM CHLORIDE, CALCIUM CHLORIDE 600; 310; 30; 20 MG/100ML; MG/100ML; MG/100ML; MG/100ML
1500 INJECTION, SOLUTION INTRAVENOUS ONCE
Status: CANCELLED
Start: 2022-01-14 | End: 2022-01-14

## 2022-01-13 RX ADMIN — SODIUM CHLORIDE, SODIUM LACTATE, POTASSIUM CHLORIDE, AND CALCIUM CHLORIDE 1500 ML: .6; .31; .03; .02 INJECTION, SOLUTION INTRAVENOUS at 11:12

## 2022-01-18 ENCOUNTER — OFFICE VISIT (OUTPATIENT)
Dept: GASTROENTEROLOGY | Facility: MEDICAL CENTER | Age: 47
End: 2022-01-18
Payer: COMMERCIAL

## 2022-01-18 ENCOUNTER — HOSPITAL ENCOUNTER (OUTPATIENT)
Dept: INFUSION CENTER | Facility: CLINIC | Age: 47
Discharge: HOME | End: 2022-01-18
Payer: COMMERCIAL

## 2022-01-18 ENCOUNTER — APPOINTMENT (OUTPATIENT)
Dept: LAB | Facility: HOSPITAL | Age: 47
End: 2022-01-18
Attending: INTERNAL MEDICINE
Payer: COMMERCIAL

## 2022-01-18 VITALS
DIASTOLIC BLOOD PRESSURE: 74 MMHG | WEIGHT: 155.2 LBS | SYSTOLIC BLOOD PRESSURE: 126 MMHG | BODY MASS INDEX: 25.83 KG/M2 | HEART RATE: 93 BPM

## 2022-01-18 VITALS
TEMPERATURE: 97.5 F | RESPIRATION RATE: 18 BRPM | DIASTOLIC BLOOD PRESSURE: 83 MMHG | SYSTOLIC BLOOD PRESSURE: 118 MMHG | HEART RATE: 98 BPM

## 2022-01-18 DIAGNOSIS — D47.3 ESSENTIAL (HEMORRHAGIC) THROMBOCYTHEMIA (HCC): ICD-10-CM

## 2022-01-18 DIAGNOSIS — K50.119 CROHN'S DISEASE OF COLON WITH COMPLICATION (HCC): ICD-10-CM

## 2022-01-18 DIAGNOSIS — D84.9 IMMUNOSUPPRESSED STATUS (HCC): ICD-10-CM

## 2022-01-18 DIAGNOSIS — E44.1 MILD PROTEIN-CALORIE MALNUTRITION (HCC): ICD-10-CM

## 2022-01-18 DIAGNOSIS — R63.0 ANOREXIA: Primary | ICD-10-CM

## 2022-01-18 DIAGNOSIS — Z11.59 ENCOUNTER FOR SCREENING FOR OTHER VIRAL DISEASES: ICD-10-CM

## 2022-01-18 DIAGNOSIS — K50.119 CROHN'S DISEASE OF COLON WITH COMPLICATION (HCC): Primary | ICD-10-CM

## 2022-01-18 DIAGNOSIS — K52.9 INFLAMMATORY BOWEL DISEASE: ICD-10-CM

## 2022-01-18 DIAGNOSIS — R19.7 DIARRHEA, UNSPECIFIED TYPE: ICD-10-CM

## 2022-01-18 LAB
ALBUMIN SERPL BCP-MCNC: 4 G/DL (ref 3.5–5)
ALP SERPL-CCNC: 52 U/L (ref 46–116)
ALT SERPL W P-5'-P-CCNC: 20 U/L (ref 12–78)
ANION GAP SERPL CALCULATED.3IONS-SCNC: 14 MMOL/L (ref 4–13)
AST SERPL W P-5'-P-CCNC: 12 U/L (ref 5–45)
BASOPHILS # BLD AUTO: 0.05 THOUSANDS/ΜL (ref 0–0.1)
BASOPHILS NFR BLD AUTO: 1 % (ref 0–1)
BILIRUB SERPL-MCNC: 0.42 MG/DL (ref 0.2–1)
BUN SERPL-MCNC: 14 MG/DL (ref 5–25)
CALCIUM SERPL-MCNC: 9.1 MG/DL (ref 8.3–10.1)
CHLORIDE SERPL-SCNC: 102 MMOL/L (ref 100–108)
CO2 SERPL-SCNC: 24 MMOL/L (ref 21–32)
CREAT SERPL-MCNC: 0.74 MG/DL (ref 0.6–1.3)
CRP SERPL QL: <0.5 MG/L
EOSINOPHIL # BLD AUTO: 0 THOUSAND/ΜL (ref 0–0.61)
EOSINOPHIL NFR BLD AUTO: 0 % (ref 0–6)
ERYTHROCYTE [DISTWIDTH] IN BLOOD BY AUTOMATED COUNT: 14.6 % (ref 11.6–15.1)
GFR SERPL CREATININE-BSD FRML MDRD: 96 ML/MIN/1.73SQ M
GLUCOSE SERPL-MCNC: 160 MG/DL (ref 65–140)
HCT VFR BLD AUTO: 42.3 % (ref 34.8–46.1)
HGB BLD-MCNC: 14.2 G/DL (ref 11.5–15.4)
IMM GRANULOCYTES # BLD AUTO: 0.04 THOUSAND/UL (ref 0–0.2)
IMM GRANULOCYTES NFR BLD AUTO: 0 % (ref 0–2)
LYMPHOCYTES # BLD AUTO: 0.48 THOUSANDS/ΜL (ref 0.6–4.47)
LYMPHOCYTES NFR BLD AUTO: 5 % (ref 14–44)
MCH RBC QN AUTO: 30.8 PG (ref 26.8–34.3)
MCHC RBC AUTO-ENTMCNC: 33.6 G/DL (ref 31.4–37.4)
MCV RBC AUTO: 92 FL (ref 82–98)
MONOCYTES # BLD AUTO: 0.05 THOUSAND/ΜL (ref 0.17–1.22)
MONOCYTES NFR BLD AUTO: 1 % (ref 4–12)
NEUTROPHILS # BLD AUTO: 9.63 THOUSANDS/ΜL (ref 1.85–7.62)
NEUTS SEG NFR BLD AUTO: 93 % (ref 43–75)
NRBC BLD AUTO-RTO: 0 /100 WBCS
PLATELET # BLD AUTO: 421 THOUSANDS/UL (ref 149–390)
PMV BLD AUTO: 9.6 FL (ref 8.9–12.7)
POTASSIUM SERPL-SCNC: 3.7 MMOL/L (ref 3.5–5.3)
PROT SERPL-MCNC: 8 G/DL (ref 6.4–8.2)
RBC # BLD AUTO: 4.61 MILLION/UL (ref 3.81–5.12)
SODIUM SERPL-SCNC: 140 MMOL/L (ref 136–145)
WBC # BLD AUTO: 10.25 THOUSAND/UL (ref 4.31–10.16)

## 2022-01-18 PROCEDURE — 96375 TX/PRO/DX INJ NEW DRUG ADDON: CPT

## 2022-01-18 PROCEDURE — 86705 HEP B CORE ANTIBODY IGM: CPT

## 2022-01-18 PROCEDURE — 99214 OFFICE O/P EST MOD 30 MIN: CPT | Performed by: INTERNAL MEDICINE

## 2022-01-18 PROCEDURE — 86704 HEP B CORE ANTIBODY TOTAL: CPT

## 2022-01-18 PROCEDURE — 96361 HYDRATE IV INFUSION ADD-ON: CPT

## 2022-01-18 PROCEDURE — 86706 HEP B SURFACE ANTIBODY: CPT

## 2022-01-18 PROCEDURE — 87340 HEPATITIS B SURFACE AG IA: CPT

## 2022-01-18 PROCEDURE — 36415 COLL VENOUS BLD VENIPUNCTURE: CPT

## 2022-01-18 PROCEDURE — 86140 C-REACTIVE PROTEIN: CPT

## 2022-01-18 PROCEDURE — 86480 TB TEST CELL IMMUN MEASURE: CPT

## 2022-01-18 PROCEDURE — 86803 HEPATITIS C AB TEST: CPT

## 2022-01-18 PROCEDURE — 96365 THER/PROPH/DIAG IV INF INIT: CPT

## 2022-01-18 PROCEDURE — 1036F TOBACCO NON-USER: CPT | Performed by: INTERNAL MEDICINE

## 2022-01-18 PROCEDURE — 85025 COMPLETE CBC W/AUTO DIFF WBC: CPT

## 2022-01-18 PROCEDURE — 80053 COMPREHEN METABOLIC PANEL: CPT

## 2022-01-18 RX ORDER — METHYLPREDNISOLONE SODIUM SUCCINATE 125 MG/2ML
60 INJECTION, POWDER, LYOPHILIZED, FOR SOLUTION INTRAMUSCULAR; INTRAVENOUS ONCE
Status: CANCELLED
Start: 2022-01-24 | End: 2022-01-19

## 2022-01-18 RX ORDER — SODIUM CHLORIDE, SODIUM LACTATE, POTASSIUM CHLORIDE, CALCIUM CHLORIDE 600; 310; 30; 20 MG/100ML; MG/100ML; MG/100ML; MG/100ML
1500 INJECTION, SOLUTION INTRAVENOUS ONCE
Status: CANCELLED
Start: 2022-01-20 | End: 2022-01-19

## 2022-01-18 RX ORDER — METHYLPREDNISOLONE SODIUM SUCCINATE 125 MG/2ML
60 INJECTION, POWDER, LYOPHILIZED, FOR SOLUTION INTRAMUSCULAR; INTRAVENOUS ONCE
Status: COMPLETED | OUTPATIENT
Start: 2022-01-18 | End: 2022-01-18

## 2022-01-18 RX ORDER — SODIUM CHLORIDE, SODIUM LACTATE, POTASSIUM CHLORIDE, CALCIUM CHLORIDE 600; 310; 30; 20 MG/100ML; MG/100ML; MG/100ML; MG/100ML
1500 INJECTION, SOLUTION INTRAVENOUS ONCE
Status: COMPLETED | OUTPATIENT
Start: 2022-01-18 | End: 2022-01-18

## 2022-01-18 RX ADMIN — METHYLPREDNISOLONE SODIUM SUCCINATE 60 MG: 125 INJECTION, POWDER, FOR SOLUTION INTRAMUSCULAR; INTRAVENOUS at 11:07

## 2022-01-18 RX ADMIN — DIPHENHYDRAMINE HYDROCHLORIDE 50 MG: 50 INJECTION, SOLUTION INTRAMUSCULAR; INTRAVENOUS at 11:11

## 2022-01-18 RX ADMIN — SODIUM CHLORIDE, SODIUM LACTATE, POTASSIUM CHLORIDE, AND CALCIUM CHLORIDE 1500 ML: .6; .31; .03; .02 INJECTION, SOLUTION INTRAVENOUS at 11:11

## 2022-01-18 NOTE — PROGRESS NOTES
Patient tolerated her treatment well without adverse reaction  Patient left ambulatory and is aware of her next treatment

## 2022-01-18 NOTE — PROGRESS NOTES
[facility] - Outpatient Follow-up Note  Madie Muniz 48 y o  female MRN: [id number]  Encounter: [id number]          ASSESSMENT AND PLAN:    Madie Muniz is a 48 y o  female who presents with complaint of inflammatory bowel disease thought to be Crohn's versus indeterminate colitis versus ulcerative colitis with patchy healing, prior use of anti-TNFs complicated by drug-induced lupus, prior diagnosis of mast cell activation syndrome, IVIG for report of possible immunodeficiency presents for follow-up  She recently had admission in late 2021 for both systemic and GI symptoms  She had recent COVID and receive monoclonal antibody  Stelara level last checked was good  Overall she is doing much better now and attributes this to hydration therapy  Most recent blood work within normal BMP including creatinine, and prior CMP with an albumin of 3 1 but normal AST and ALT  Lipase 96 recently checked  White blood cell count 12 11 with normal hemoglobin and platelets of 596  Prior CRP was 3  MRI of the abdomen from July 2021 with mild central intrahepatic and extrahepatic biliary ductal prominence but no progression from prior imaging  DEXA with osteopenia but no osteoporosis  Colonoscopy from October 2021 with grossly normal-appearing ileum and colon, an EGD was grossly normal   Biopsies showed inactive gastritis and normal duodenum and esophagus, as well as patchy chronic active colitis on the left colon and chronic quiescent colitis in the rectum but otherwise normal colon biopsies  Stelara level 7 7 with no anti drug antibodies in October 1  Crohn's disease of colon with complication ([address] )    2  Inflammatory bowel disease    3  Immunosuppressed status ([address] )    4  Mild protein-calorie malnutrition ([address] )    5  Essential (hemorrhagic) thrombocythemia ([address] )    6   Encounter for screening for other viral diseases         Orders Placed This Encounter   Procedures    Calprotectin,Fecal    Quantiferon TB Gold Plus    Chronic Hepatitis Panel    CBC and differential    Comprehensive metabolic panel    C-reactive protein     Continue Stelara every 4 weeks  Will reorder blood work to be done sometime in February including Lexx Dasilva (which is due May 2022)  Decrease prednisone in 2 weeks, and decrease the daily 5 mg per day every 2 weeks until off  Continue fluid hydration, IV steroids and anti-histamine pre IVIG injections  Continue PPI twice daily for now and taper int he future  Follow-up with Dr Jennifer Pack from [facility]  Follow-up with rheumatology    I have spent 40 minutes with Patient  today in which greater than 50% of this time was spent in counseling/coordination of care regarding Intructions for management and Impressions  ______________________________________________________________________    SUBJECTIVE:    Consuelo Taylor is a 48 y o  female who presents with complaint of Crohn's  She is on Stelara every 4 weeks, prednisone 20mg  She discontinued the budesonide  She is currently doing better  The two treatments, increased fluids, change in IVIG and pre-medications all seem to be helping her  She has pain before a BM which let's her know she will have to have a BM, but that has been resolved in the past 2 5 weeks  No urgency  Normal stools, formed  No diarrhea, BRBPR or black stools  (When she is constipated, she has some point tenderness that improves when she sits on it; in 2017 Dr Gunjan Garcia did an operation and no adhesions were seen)  She sleeps with a heating pad on her abdomen  No RUQ abdominal pain  No oily stools  She is interested in going back to work but will make sure her treatments come first  She believes the issue in the end of 2021 may have been related to her colonoscopy (secondary to change in gut microbiome and change in hydration), but she notes she did not feel the significant symptoms till mid November  She also had COVID in December   She had gene studies performed and is working with Dr Alexandria Ragland from [facility] beds at the [facility]  She has heartburn off the PPI, but better with it  No dysphagia, odynophagia, nausea, vomiting  Weight stable      REVIEW OF SYSTEMS IS OTHERWISE NEGATIVE  10 point ROS reviewed and negative, except as above      Historical Information   Past Medical History:   Diagnosis Date    Anxiety     Asthma     Chronic kidney disease     Deep vein thrombosis (HCC)     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    Generalized anxiety disorder 8/26/2017    Heart murmur     Mast cell disease     Migraine     Myocardial infarction [facility])     NSTEMI  pt denies, documented in cardio note    Pancreatitis     sphinger of     Psychiatric disorder     RA [facility]umatoid [facility]     Ulcerative colitis ([facility] )      Past Surgical History:   Procedure Laterality Date    APPENDECTOMY      CHOLECYSTECTOMY      EGD AND COLONOSCOPY N/A 9/12/2017    Procedure: EGD AND COLONOSCOPY;  Surgeon: Caty Delgadillo MD;  [facility]; Service: Gastroenterology    ERCP N/A 9/15/2017    Procedure: ENDOSCOPIC RETROGRADE CHOLANGIOPANCREATOGRAPHY (ERCP); Surgeon: Benji Mcdonough MD;  [facility];   Service: Gastroenterology    HYSTERECTOMY      KNEE SURGERY Right     LAPAROSCOPIC ENDOMETRIOSIS FULGURATION      ORIF ANKLE FRACTURE Left     AZ KNEE SCOPE,MED/LAT MENISECTOMY Left 5/12/2017    Procedure: POSSIBLE MEDIAL MENISECTOMY;  Surgeon: Rommel Castellanos MD;  [facility];  Service: Orthopedics    AZ KNEE [address] CART Left 5/12/2017    Procedure: KNEE ARTHROSCOPY EVALUATION, CHONDROPLASTY;  Surgeon: Rommel Castellanos MD;  [facility];  Service: Orthopedics    AZ LAP,DIAGNOSTIC ABDOMEN N/A 12/12/2017    Procedure: LAPAROSCOPY DIAGNOSTIC  WITH LYSIS OF ADHESIONS;  Surgeon: Juancho Patel DO;  [facility];  Service: General     Social History   Social History     Substance and Sexual Activity   Alcohol Use Never     Social History     Substance and Sexual Activity   Drug Use Not Currently     Social History     Tobacco Use   Smoking Status Never Smoker   Smokeless Tobacco Never Used     Family History   Problem Relation Age of Onset    Ulcerative colitis Mother     Heart failure Father     Neuropathy Father     Macular degeneration Father     Diabetes Father     Asthma Daughter     Hypothyroidism Daughter     Heart disease Maternal Grandmother     Arthritis Maternal Grandmother     Arthritis Paternal Grandmother     Macular degeneration Paternal Grandmother     Lymphoma Paternal Grandfather     Heart failure Paternal Aunt     Heart failure Paternal [de-identified]     Breast cancer Paternal Aunt 47    Breast cancer additional onset Paternal Aunt 62    Hypothyroidism Paternal Aunt     Breast cancer Maternal Aunt     No Known Problems Maternal Grandfather        Meds/Allergies       Current Outpatient Medications:     butalbital-acetaminophen-caffeine (FIORICET,ESGIC) -40 mg per tablet    cetirizine (ZyrTEC) 10 mg tablet    cyanocobalamin 1,000 mcg/mL    diphenhydrAMINE (BENADRYL) 25 mg tablet    EPINEPHrine (EPIPEN 2-MARYSOL IJ)    ergocalciferol (VITAMIN D2) 50,000 units    famotidine (PEPCID) 40 MG tablet    hydrOXYzine HCL (ATARAX) 25 mg tablet    levothyroxine 50 mcg tablet    linaGLIPtin 5 MG TABS    liothyronine (CYTOMEL) 5 mcg tablet    LORazepam (ATIVAN) 1 mg tablet    montelukast (SINGULAIR) 10 mg tablet    omalizumab (XOLAIR) 150 mg    ondansetron (ZOFRAN-ODT) 4 mg disintegrating tablet    pantoprazole (PROTONIX) 40 mg tablet    predniSONE 20 mg tablet    Stelara 90 MG/ML subcutaneous injection    temazepam (RESTORIL) 30 mg capsule    acetaminophen (TYLENOL) 500 mg tablet    cholecalciferol (VITAMIN D) 400 units/mL    diclofenac (VOLTAREN) 75 mg EC tablet    ergocalciferol (VITAMIN D2) 50,000 units    levalbuterol (XOPENEX HFA) 45 mcg/act inhaler    NON FORMULARY    Riboflavin (Vitamin B-2) 100 MG TABS    simethicone (MYLICON) 803 MG chewable tablet  No current facility-administered medications for this visit  Allergies   Allergen Reactions    Amitriptyline Anaphylaxis     According to the patient she had nphylaxis    Aspergillus Fumigatus Other (See Comments), Hives and Swelling    Azathioprine Other (See Comments) and Anaphylaxis     pancreatitis  According to patient she needed Epipen    Buspar [Buspirone] Hives and Shortness Of Breath    Corn Dextrin Anaphylaxis     Any corn based products    Eggs Or Egg-Derived Products - Food Allergy Anaphylaxis    Gelatin - Food Allergy Anaphylaxis    Lactated Ringers Shortness Of Breath     Pt questions this allergy, pt has received LR multiple times without reaction    Malto Dextrin [Dextrin] Anaphylaxis    Other Anaphylaxis     Gel caps, maltodextrose, dextrose,grapes    Penicillium Notatum Shortness Of Breath and Swelling    Sumatriptan Anaphylaxis     Bradycardia, sob, back pressure, head pain,throat tightness  According to the patient she had anphylaxis    Contrast [Iodinated Diagnostic Agents] Other (See Comments)     Pt states needs to be premedicated prior to injection   [employer] - Food Allergy - Food Allergy Hives    Humira [Adalimumab] Other (See Comments)     "I develop drug induced lupus"    Ibuprofen Hives and Throat Swelling    Remicade [Infliximab] Other (See Comments)     "I develop drug induced lupus"    Sulfa Antibiotics Hives and Other (See Comments)    Sulfate Hives    Sulfites - Food Allergy Hives    Chlorhexidine Itching    Histamine Hives, Rash and Other (See Comments)     Intolerance             Objective     Blood pressure 126/74, pulse 93, weight 70 4 kg (155 lb 3 2 oz), not currently breastfeeding  Body mass index is 25 83 kg/m²  PHYSICAL EXAMINATION:    General Appearance:   Alert, cooperative, no distress   HEENT:  Normocephalic, atraumatic, anicteric  Neck supple, symmetrical, trachea midline  Lungs:   Equal chest rise and unlabored breathing, normal effort, no coughing  Cardiovascular:   No visualized JVD  Abdomen:   No abdominal distension  Skin:   No jaundice, rashes, or lesions  Musculoskeletal:   Normal range of motion visualized  Psych:  Normal affect and normal insight  Neuro:  Alert and appropriate  Lab Results:   No visits with results within 1 Day(s) from this visit  Latest known visit with results is:   No results displayed because visit has over 200 results  Lab Results   Component Value Date    WBC 12 11 (H) 12/07/2021    HGB 13 5 12/07/2021    HCT 40 9 12/07/2021    MCV 92 12/07/2021     (H) 12/07/2021       Lab Results   Component Value Date     01/16/2015    SODIUM 140 12/07/2021    K 3 5 12/07/2021     12/07/2021    CO2 25 12/07/2021    ANIONGAP 12 01/16/2015    AGAP 10 12/07/2021    BUN 9 12/07/2021    CREATININE 0 85 12/07/2021    GLUC 87 12/07/2021    GLUF 104 (H) 12/04/2021    CALCIUM 8 8 12/07/2021    AST 12 12/06/2021    ALT 21 12/06/2021    ALKPHOS 46 12/06/2021    PROT 7 7 01/16/2015    TP 6 6 12/06/2021    BILITOT 0 7 01/16/2015    TBILI 0 26 12/06/2021    EGFR 81 12/07/2021       Lab Results   Component Value Date    CRP 3 0 (H) 12/05/2021       Lab Results   Component Value Date    IQC9DFZASUZG 0 450 12/02/2021       Lab Results   Component Value Date    IRON 190 (H) 11/01/2019    TIBC 355 11/01/2019    FERRITIN 42 11/09/2020       Radiology Results:   No results found

## 2022-01-18 NOTE — PLAN OF CARE
Problem: INFECTION - ADULT  Goal: Absence or prevention of progression during hospitalization  Description: INTERVENTIONS:  - Assess and monitor for signs and symptoms of infection  - Monitor lab/diagnostic results  - Monitor all insertion sites, i e  indwelling lines, tubes, and drains  - Monitor endotracheal if appropriate and nasal secretions for changes in amount and color  - [place] appropriate cooling/warming therapies per order  - Administer medications as ordered  - Instruct and encourage patient and family to use good hand hygiene technique  - Identify and instruct in appropriate isolation precautions for identified infection/condition  Outcome: Progressing

## 2022-01-19 DIAGNOSIS — Z11.59 ENCOUNTER FOR SCREENING FOR OTHER VIRAL DISEASES: Primary | ICD-10-CM

## 2022-01-19 LAB
HBV CORE AB SER QL: NORMAL
HBV CORE IGM SER QL: NORMAL
HBV SURFACE AB SER-ACNC: 224.47 MIU/ML
HBV SURFACE AG SER QL: NORMAL
HCV AB SER QL: NORMAL

## 2022-01-20 ENCOUNTER — HOSPITAL ENCOUNTER (OUTPATIENT)
Dept: INFUSION CENTER | Facility: CLINIC | Age: 47
Discharge: HOME | End: 2022-01-20
Payer: COMMERCIAL

## 2022-01-20 ENCOUNTER — TELEPHONE (OUTPATIENT)
Dept: GASTROENTEROLOGY | Facility: CLINIC | Age: 47
End: 2022-01-20

## 2022-01-20 VITALS
RESPIRATION RATE: 18 BRPM | SYSTOLIC BLOOD PRESSURE: 141 MMHG | HEART RATE: 96 BPM | TEMPERATURE: 96.9 F | DIASTOLIC BLOOD PRESSURE: 97 MMHG

## 2022-01-20 DIAGNOSIS — E44.1 MILD PROTEIN-CALORIE MALNUTRITION (HCC): ICD-10-CM

## 2022-01-20 DIAGNOSIS — R63.0 ANOREXIA: Primary | ICD-10-CM

## 2022-01-20 DIAGNOSIS — R19.7 DIARRHEA, UNSPECIFIED TYPE: ICD-10-CM

## 2022-01-20 LAB
GAMMA INTERFERON BACKGROUND BLD IA-ACNC: 0.02 IU/ML
M TB IFN-G BLD-IMP: NEGATIVE
M TB IFN-G CD4+ BCKGRND COR BLD-ACNC: 0 IU/ML
M TB IFN-G CD4+ BCKGRND COR BLD-ACNC: 0 IU/ML
MITOGEN IGNF BCKGRD COR BLD-ACNC: 0.55 IU/ML

## 2022-01-20 PROCEDURE — 96360 HYDRATION IV INFUSION INIT: CPT

## 2022-01-20 PROCEDURE — 96361 HYDRATE IV INFUSION ADD-ON: CPT

## 2022-01-20 RX ORDER — SODIUM CHLORIDE, SODIUM LACTATE, POTASSIUM CHLORIDE, CALCIUM CHLORIDE 600; 310; 30; 20 MG/100ML; MG/100ML; MG/100ML; MG/100ML
1500 INJECTION, SOLUTION INTRAVENOUS ONCE
Status: COMPLETED | OUTPATIENT
Start: 2022-01-20 | End: 2022-01-20

## 2022-01-20 RX ORDER — METHYLPREDNISOLONE SODIUM SUCCINATE 125 MG/2ML
60 INJECTION, POWDER, LYOPHILIZED, FOR SOLUTION INTRAMUSCULAR; INTRAVENOUS ONCE
Status: CANCELLED
Start: 2022-01-24 | End: 2022-01-21

## 2022-01-20 RX ORDER — SODIUM CHLORIDE, SODIUM LACTATE, POTASSIUM CHLORIDE, CALCIUM CHLORIDE 600; 310; 30; 20 MG/100ML; MG/100ML; MG/100ML; MG/100ML
1500 INJECTION, SOLUTION INTRAVENOUS ONCE
Status: CANCELLED
Start: 2022-01-24 | End: 2022-01-21

## 2022-01-20 RX ADMIN — SODIUM CHLORIDE, SODIUM LACTATE, POTASSIUM CHLORIDE, AND CALCIUM CHLORIDE 1500 ML: .6; .31; .03; .02 INJECTION, SOLUTION INTRAVENOUS at 12:12

## 2022-01-20 NOTE — TELEPHONE ENCOUNTER
Hi,    The patient is requesting approval for Stelara every 21 days because that is when she becomes symptomatic  She said she is also out of Stelara and needs it to be set up again  Can you help take care of this and try to get it approved for every 21 days?     Thank you no

## 2022-01-20 NOTE — TELEPHONE ENCOUNTER
----- Message from Hemalatha Zafar sent at 1/20/2022  2:18 PM EST -----  Regarding: FW: Blood work    ----- Message -----  From: Aimee Lawrence  Sent: 1/20/2022  12:47 PM EST  To: [facility] [place] [facility]  Subject: Blood work                                       Good afternoon Dr Joie Pratt your day is going great  I had my follow up appointment with Dr Elliot Ayers, Rheumatology Department at [facility]  We discussed my new diagnosis as well the DILupus, RA, and Crohns  I explained to her the "21" day phase of the Stelara and how all the above issues start flaring when I hit around that "21" lux until my next injection  She recommended increasing the frequency to every 21 days  I know you stated that in the past the success of getting insurance to approve that has been difficult  Dr Elliot Ayers said she has been successful in getting the approvals and has many patients who had the same "21" day cycle and were successfully approved to increase the frequency from 28 days to 21 days  My Stelara prescription ran out today with my last shipment coming this Friday for Tuesdays injection, so we will need to do a new prescription approval with the insurance anyways so we might as well try for the 21 days  Dr Elliot Ayers was going to note that recommendation in her visit notes and send that to your office to use in pleading our case to the insurance  In the event it's not approved, she recommended  increasing the IV steroid frequency during that 21-28 day stretch to help with the symptoms that flare during that period  This will avoid the need to increase oral meds that then cause the Mast Cell flares and corn allergy triggers  I hope this is making sense to you  Let me know your thoughts and where we go from here      Thank you   Talia Leong

## 2022-01-21 ENCOUNTER — APPOINTMENT (OUTPATIENT)
Dept: LAB | Facility: HOSPITAL | Age: 47
End: 2022-01-21
Payer: COMMERCIAL

## 2022-01-21 DIAGNOSIS — E03.2 HYPOTHYROIDISM DUE TO DRUGS: ICD-10-CM

## 2022-01-21 DIAGNOSIS — R73.9 BLOOD GLUCOSE ELEVATED: ICD-10-CM

## 2022-01-21 DIAGNOSIS — L50.9 URTICARIA, UNSPECIFIED: ICD-10-CM

## 2022-01-21 LAB
BASOPHILS # BLD AUTO: 0.07 THOUSANDS/ΜL (ref 0–0.1)
BASOPHILS NFR BLD AUTO: 1 % (ref 0–1)
EOSINOPHIL # BLD AUTO: 0.03 THOUSAND/ΜL (ref 0–0.61)
EOSINOPHIL NFR BLD AUTO: 0 % (ref 0–6)
ERYTHROCYTE [DISTWIDTH] IN BLOOD BY AUTOMATED COUNT: 14.7 % (ref 11.6–15.1)
HCT VFR BLD AUTO: 45.6 % (ref 34.8–46.1)
HGB BLD-MCNC: 15 G/DL (ref 11.5–15.4)
IMM GRANULOCYTES # BLD AUTO: 0.08 THOUSAND/UL (ref 0–0.2)
IMM GRANULOCYTES NFR BLD AUTO: 1 % (ref 0–2)
LYMPHOCYTES # BLD AUTO: 1.03 THOUSANDS/ΜL (ref 0.6–4.47)
LYMPHOCYTES NFR BLD AUTO: 8 % (ref 14–44)
MCH RBC QN AUTO: 30.6 PG (ref 26.8–34.3)
MCHC RBC AUTO-ENTMCNC: 32.9 G/DL (ref 31.4–37.4)
MCV RBC AUTO: 93 FL (ref 82–98)
MONOCYTES # BLD AUTO: 0.31 THOUSAND/ΜL (ref 0.17–1.22)
MONOCYTES NFR BLD AUTO: 3 % (ref 4–12)
NEUTROPHILS # BLD AUTO: 11 THOUSANDS/ΜL (ref 1.85–7.62)
NEUTS SEG NFR BLD AUTO: 87 % (ref 43–75)
NRBC BLD AUTO-RTO: 0 /100 WBCS
PLATELET # BLD AUTO: 416 THOUSANDS/UL (ref 149–390)
PMV BLD AUTO: 9.2 FL (ref 8.9–12.7)
RBC # BLD AUTO: 4.9 MILLION/UL (ref 3.81–5.12)
WBC # BLD AUTO: 12.52 THOUSAND/UL (ref 4.31–10.16)

## 2022-01-21 PROCEDURE — 36415 COLL VENOUS BLD VENIPUNCTURE: CPT

## 2022-01-21 PROCEDURE — 83036 HEMOGLOBIN GLYCOSYLATED A1C: CPT

## 2022-01-21 PROCEDURE — 85025 COMPLETE CBC W/AUTO DIFF WBC: CPT

## 2022-01-22 ENCOUNTER — TELEPHONE (OUTPATIENT)
Dept: GASTROENTEROLOGY | Facility: CLINIC | Age: 47
End: 2022-01-22

## 2022-01-22 DIAGNOSIS — K50.119 CROHN'S DISEASE OF COLON WITH COMPLICATION (HCC): Primary | ICD-10-CM

## 2022-01-22 LAB
EST. AVERAGE GLUCOSE BLD GHB EST-MCNC: 103 MG/DL
HBA1C MFR BLD: 5.2 %

## 2022-01-22 NOTE — TELEPHONE ENCOUNTER
Gallo Johnson MD         1/20/22 2:33 PM  Note  Hi,     The patient is requesting approval for Stelara every 21 days because that is when she becomes symptomatic       She said she is also out of Stelara and needs it to be set up again       Can you help take care of this and try to get it approved for every 21 days?     Thank you

## 2022-01-24 ENCOUNTER — TELEPHONE (OUTPATIENT)
Dept: GASTROENTEROLOGY | Facility: CLINIC | Age: 47
End: 2022-01-24

## 2022-01-24 ENCOUNTER — HOSPITAL ENCOUNTER (OUTPATIENT)
Dept: INFUSION CENTER | Facility: CLINIC | Age: 47
Discharge: HOME | End: 2022-01-24
Payer: COMMERCIAL

## 2022-01-24 VITALS
DIASTOLIC BLOOD PRESSURE: 80 MMHG | HEART RATE: 118 BPM | RESPIRATION RATE: 18 BRPM | SYSTOLIC BLOOD PRESSURE: 114 MMHG | TEMPERATURE: 98.2 F

## 2022-01-24 DIAGNOSIS — R19.7 DIARRHEA, UNSPECIFIED TYPE: ICD-10-CM

## 2022-01-24 DIAGNOSIS — R63.0 ANOREXIA: Primary | ICD-10-CM

## 2022-01-24 DIAGNOSIS — E44.1 MILD PROTEIN-CALORIE MALNUTRITION (HCC): ICD-10-CM

## 2022-01-24 PROCEDURE — 96365 THER/PROPH/DIAG IV INF INIT: CPT

## 2022-01-24 PROCEDURE — 96361 HYDRATE IV INFUSION ADD-ON: CPT

## 2022-01-24 PROCEDURE — 96375 TX/PRO/DX INJ NEW DRUG ADDON: CPT

## 2022-01-24 RX ORDER — USTEKINUMAB 90 MG/ML
INJECTION, SOLUTION SUBCUTANEOUS
Qty: 1 ML | Refills: 6 | Status: SHIPPED | OUTPATIENT
Start: 2022-01-24 | End: 2022-01-28 | Stop reason: SDUPTHER

## 2022-01-24 RX ORDER — METHYLPREDNISOLONE SODIUM SUCCINATE 125 MG/2ML
60 INJECTION, POWDER, LYOPHILIZED, FOR SOLUTION INTRAMUSCULAR; INTRAVENOUS ONCE
Status: COMPLETED | OUTPATIENT
Start: 2022-01-24 | End: 2022-01-24

## 2022-01-24 RX ORDER — SODIUM CHLORIDE, SODIUM LACTATE, POTASSIUM CHLORIDE, CALCIUM CHLORIDE 600; 310; 30; 20 MG/100ML; MG/100ML; MG/100ML; MG/100ML
1500 INJECTION, SOLUTION INTRAVENOUS ONCE
Status: CANCELLED
Start: 2022-01-26 | End: 2022-01-26

## 2022-01-24 RX ORDER — SODIUM CHLORIDE, SODIUM LACTATE, POTASSIUM CHLORIDE, CALCIUM CHLORIDE 600; 310; 30; 20 MG/100ML; MG/100ML; MG/100ML; MG/100ML
1500 INJECTION, SOLUTION INTRAVENOUS ONCE
Status: COMPLETED | OUTPATIENT
Start: 2022-01-24 | End: 2022-01-24

## 2022-01-24 RX ORDER — METHYLPREDNISOLONE SODIUM SUCCINATE 125 MG/2ML
60 INJECTION, POWDER, LYOPHILIZED, FOR SOLUTION INTRAMUSCULAR; INTRAVENOUS ONCE
Status: CANCELLED
Start: 2022-01-26 | End: 2022-01-26

## 2022-01-24 RX ADMIN — DIPHENHYDRAMINE HYDROCHLORIDE 50 MG: 50 INJECTION, SOLUTION INTRAMUSCULAR; INTRAVENOUS at 13:09

## 2022-01-24 RX ADMIN — METHYLPREDNISOLONE SODIUM SUCCINATE 60 MG: 125 INJECTION, POWDER, FOR SOLUTION INTRAMUSCULAR; INTRAVENOUS at 13:06

## 2022-01-24 RX ADMIN — SODIUM CHLORIDE, SODIUM LACTATE, POTASSIUM CHLORIDE, AND CALCIUM CHLORIDE 1500 ML: .6; .31; .03; .02 INJECTION, SOLUTION INTRAVENOUS at 13:05

## 2022-01-24 NOTE — TELEPHONE ENCOUNTER
Karishma Corona MD routed conversation to You 24 minutes ago (11:19 AM)     Karishma Corona MD 24 minutes ago (11:19 AM)     YS       New script placed       Thank you         Documentation       You routed conversation to Karishma Corona MD 1 hour ago (10:28 AM)     You 2 days ago     AB       Please place new script for Stelara      Thank You          Documentation      You 2 days ago     Wilner Ruiz MD     YS    1/20/22 2:33 PM  Note  Hi,     The patient is requesting approval for Stelara every 21 days because that is when she becomes symptomatic       She said she is also out of Stelara and needs it to be set up again       Can you help take care of this and try to get it approved for every 21 days?     Thank you

## 2022-01-24 NOTE — PROGRESS NOTES
Patient tolerated treatment without complication  AVS declined, aware of future appointments, patient left unit in stable condition

## 2022-01-25 NOTE — TELEPHONE ENCOUNTER
Cezar White has not yet replied to your PA request  Ashley Mak may close this dialog, return to your dashboard, and perform other tasks  To check for an update later, open this request again from your dashboard  If Cezar White has not replied to your request within 24-72 hours please contact Cezar White at [phone]

## 2022-01-25 NOTE — TELEPHONE ENCOUNTER
Submitting frequency change on cover my meds    Stelara q21 days    [facility]- [id number]

## 2022-01-26 ENCOUNTER — HOSPITAL ENCOUNTER (OUTPATIENT)
Dept: INFUSION CENTER | Facility: CLINIC | Age: 47
Discharge: HOME | End: 2022-01-26
Payer: COMMERCIAL

## 2022-01-26 VITALS
HEART RATE: 94 BPM | DIASTOLIC BLOOD PRESSURE: 87 MMHG | SYSTOLIC BLOOD PRESSURE: 133 MMHG | RESPIRATION RATE: 18 BRPM | TEMPERATURE: 97.4 F

## 2022-01-26 DIAGNOSIS — R63.0 ANOREXIA: Primary | ICD-10-CM

## 2022-01-26 DIAGNOSIS — E44.1 MILD PROTEIN-CALORIE MALNUTRITION (HCC): ICD-10-CM

## 2022-01-26 DIAGNOSIS — R19.7 DIARRHEA, UNSPECIFIED TYPE: ICD-10-CM

## 2022-01-26 PROCEDURE — 96360 HYDRATION IV INFUSION INIT: CPT

## 2022-01-26 PROCEDURE — 96361 HYDRATE IV INFUSION ADD-ON: CPT

## 2022-01-26 RX ORDER — SODIUM CHLORIDE, SODIUM LACTATE, POTASSIUM CHLORIDE, CALCIUM CHLORIDE 600; 310; 30; 20 MG/100ML; MG/100ML; MG/100ML; MG/100ML
1500 INJECTION, SOLUTION INTRAVENOUS ONCE
Status: CANCELLED
Start: 2022-01-27 | End: 2022-01-27

## 2022-01-26 RX ORDER — METHYLPREDNISOLONE SODIUM SUCCINATE 125 MG/2ML
60 INJECTION, POWDER, LYOPHILIZED, FOR SOLUTION INTRAMUSCULAR; INTRAVENOUS ONCE
Status: DISCONTINUED | OUTPATIENT
Start: 2022-01-26 | End: 2022-01-29 | Stop reason: HOSPADM

## 2022-01-26 RX ORDER — METHYLPREDNISOLONE SODIUM SUCCINATE 125 MG/2ML
60 INJECTION, POWDER, LYOPHILIZED, FOR SOLUTION INTRAMUSCULAR; INTRAVENOUS ONCE
Status: CANCELLED
Start: 2022-01-27 | End: 2022-01-27

## 2022-01-26 RX ORDER — SODIUM CHLORIDE, SODIUM LACTATE, POTASSIUM CHLORIDE, CALCIUM CHLORIDE 600; 310; 30; 20 MG/100ML; MG/100ML; MG/100ML; MG/100ML
1500 INJECTION, SOLUTION INTRAVENOUS ONCE
Status: COMPLETED | OUTPATIENT
Start: 2022-01-26 | End: 2022-01-26

## 2022-01-26 RX ADMIN — SODIUM CHLORIDE, SODIUM LACTATE, POTASSIUM CHLORIDE, AND CALCIUM CHLORIDE 1500 ML: .6; .31; .03; .02 INJECTION, SOLUTION INTRAVENOUS at 12:45

## 2022-01-26 NOTE — PROGRESS NOTES
Pt presents for LR hydration  No new meds or concerns  Pt tolerated infusion without adverse reaction  Future appointment made, avs declined

## 2022-01-28 ENCOUNTER — APPOINTMENT (OUTPATIENT)
Dept: LAB | Facility: HOSPITAL | Age: 47
End: 2022-01-28
Payer: COMMERCIAL

## 2022-01-28 DIAGNOSIS — E03.2 HYPOTHYROIDISM DUE TO DRUGS: ICD-10-CM

## 2022-01-28 DIAGNOSIS — R73.9 HYPERGLYCEMIA: ICD-10-CM

## 2022-01-28 DIAGNOSIS — K50.119 CROHN'S DISEASE OF COLON WITH COMPLICATION (HCC): ICD-10-CM

## 2022-01-28 LAB
ALBUMIN SERPL BCP-MCNC: 3.6 G/DL (ref 3.5–5)
ALP SERPL-CCNC: 53 U/L (ref 46–116)
ALT SERPL W P-5'-P-CCNC: 18 U/L (ref 12–78)
ANION GAP SERPL CALCULATED.3IONS-SCNC: 10 MMOL/L (ref 4–13)
AST SERPL W P-5'-P-CCNC: 9 U/L (ref 5–45)
BILIRUB SERPL-MCNC: 0.23 MG/DL (ref 0.2–1)
BUN SERPL-MCNC: 12 MG/DL (ref 5–25)
CALCIUM SERPL-MCNC: 8.5 MG/DL (ref 8.3–10.1)
CHLORIDE SERPL-SCNC: 105 MMOL/L (ref 100–108)
CO2 SERPL-SCNC: 24 MMOL/L (ref 21–32)
CREAT SERPL-MCNC: 1.48 MG/DL (ref 0.6–1.3)
GFR SERPL CREATININE-BSD FRML MDRD: 41 ML/MIN/1.73SQ M
GLUCOSE SERPL-MCNC: 121 MG/DL (ref 65–140)
POTASSIUM SERPL-SCNC: 3.5 MMOL/L (ref 3.5–5.3)
PROT SERPL-MCNC: 7.3 G/DL (ref 6.4–8.2)
SODIUM SERPL-SCNC: 139 MMOL/L (ref 136–145)
TSH SERPL DL<=0.05 MIU/L-ACNC: 1.18 UIU/ML (ref 0.36–3.74)

## 2022-01-28 PROCEDURE — 86800 THYROGLOBULIN ANTIBODY: CPT

## 2022-01-28 PROCEDURE — 84481 FREE ASSAY (FT-3): CPT

## 2022-01-28 PROCEDURE — 84480 ASSAY TRIIODOTHYRONINE (T3): CPT

## 2022-01-28 PROCEDURE — 83036 HEMOGLOBIN GLYCOSYLATED A1C: CPT

## 2022-01-28 PROCEDURE — 84432 ASSAY OF THYROGLOBULIN: CPT

## 2022-01-28 PROCEDURE — 36415 COLL VENOUS BLD VENIPUNCTURE: CPT

## 2022-01-28 PROCEDURE — 84439 ASSAY OF FREE THYROXINE: CPT

## 2022-01-28 PROCEDURE — 80053 COMPREHEN METABOLIC PANEL: CPT

## 2022-01-28 PROCEDURE — 84443 ASSAY THYROID STIM HORMONE: CPT

## 2022-01-28 PROCEDURE — 83525 ASSAY OF INSULIN: CPT

## 2022-01-28 RX ORDER — USTEKINUMAB 90 MG/ML
INJECTION, SOLUTION SUBCUTANEOUS
Qty: 1 ML | Refills: 6 | Status: SHIPPED | OUTPATIENT
Start: 2022-01-28 | End: 2022-01-28 | Stop reason: SDUPTHER

## 2022-01-28 RX ORDER — USTEKINUMAB 90 MG/ML
INJECTION, SOLUTION SUBCUTANEOUS
Qty: 1 ML | Refills: 6 | Status: SHIPPED | OUTPATIENT
Start: 2022-01-28 | End: 2022-04-27 | Stop reason: SDUPTHER

## 2022-01-28 NOTE — TELEPHONE ENCOUNTER
Dr Gwen Ornelas    I left  for patient explaining process this am, and to continue with every 28 days , until we get decision on Appeal    There was no message regarding her currently script for [id number] needing refill       We have no samples     Previous task open as well    Current auth on file good 1-31-21 to 3-3022    Maybe just send new script with refills until March when Cesia Stewart expires

## 2022-01-28 NOTE — TELEPHONE ENCOUNTER
Pt calling in for her Stelara subcut inj  Dosage needs to be adjusted to 21 days as discussed in encounter from 1/22  Would like this sent to the [facility] ([facility])  Please follow up with patient as she is completely out

## 2022-01-28 NOTE — TELEPHONE ENCOUNTER
Called pt regarding 21 day Stelara script, pt needs it sent to [address] [facility] in [place]  While on phone informed pt she was denied 21 day dosing  Pt expressed concern as she is completely out of stelara and due for a dose  Could we continue 28 day supply as she was approved for this prior and in the mean time work on 28 day?

## 2022-01-28 NOTE — TELEPHONE ENCOUNTER
We received denial for every 21 day dosing  I called and left detailed message for pt to return my call as well with any questions    We will write Appeal letter (Appeal process can take up to 30 days )    Per communication with DR Levine if denied patient will remain on q28 days

## 2022-01-29 LAB
EST. AVERAGE GLUCOSE BLD GHB EST-MCNC: 103 MG/DL
HBA1C MFR BLD: 5.2 %
INSULIN SERPL-ACNC: 47.1 MU/L (ref 3–25)
T3 SERPL-MCNC: 0.6 NG/ML (ref 0.6–1.8)
T3FREE SERPL-MCNC: 1.86 PG/ML (ref 2.3–4.2)
T4 FREE SERPL-MCNC: 0.79 NG/DL (ref 0.76–1.46)

## 2022-01-31 ENCOUNTER — HOSPITAL ENCOUNTER (OUTPATIENT)
Dept: INFUSION CENTER | Facility: CLINIC | Age: 47
Discharge: HOME | End: 2022-01-31
Payer: COMMERCIAL

## 2022-01-31 VITALS
HEART RATE: 116 BPM | DIASTOLIC BLOOD PRESSURE: 85 MMHG | RESPIRATION RATE: 18 BRPM | SYSTOLIC BLOOD PRESSURE: 134 MMHG | TEMPERATURE: 98.2 F

## 2022-01-31 DIAGNOSIS — R19.7 DIARRHEA, UNSPECIFIED TYPE: ICD-10-CM

## 2022-01-31 DIAGNOSIS — R63.0 ANOREXIA: Primary | ICD-10-CM

## 2022-01-31 DIAGNOSIS — E44.1 MILD PROTEIN-CALORIE MALNUTRITION (HCC): ICD-10-CM

## 2022-01-31 PROCEDURE — 96365 THER/PROPH/DIAG IV INF INIT: CPT

## 2022-01-31 PROCEDURE — 96361 HYDRATE IV INFUSION ADD-ON: CPT

## 2022-01-31 PROCEDURE — 96375 TX/PRO/DX INJ NEW DRUG ADDON: CPT

## 2022-01-31 RX ORDER — METHYLPREDNISOLONE SODIUM SUCCINATE 125 MG/2ML
60 INJECTION, POWDER, LYOPHILIZED, FOR SOLUTION INTRAMUSCULAR; INTRAVENOUS ONCE
Status: COMPLETED | OUTPATIENT
Start: 2022-01-31 | End: 2022-01-31

## 2022-01-31 RX ORDER — SODIUM CHLORIDE, SODIUM LACTATE, POTASSIUM CHLORIDE, CALCIUM CHLORIDE 600; 310; 30; 20 MG/100ML; MG/100ML; MG/100ML; MG/100ML
1500 INJECTION, SOLUTION INTRAVENOUS ONCE
Status: COMPLETED | OUTPATIENT
Start: 2022-01-31 | End: 2022-01-31

## 2022-01-31 RX ORDER — SODIUM CHLORIDE, SODIUM LACTATE, POTASSIUM CHLORIDE, CALCIUM CHLORIDE 600; 310; 30; 20 MG/100ML; MG/100ML; MG/100ML; MG/100ML
1500 INJECTION, SOLUTION INTRAVENOUS ONCE
Status: CANCELLED
Start: 2022-02-03 | End: 2022-02-01

## 2022-01-31 RX ORDER — METHYLPREDNISOLONE SODIUM SUCCINATE 125 MG/2ML
60 INJECTION, POWDER, LYOPHILIZED, FOR SOLUTION INTRAMUSCULAR; INTRAVENOUS ONCE
Status: CANCELLED
Start: 2022-02-08 | End: 2022-02-01

## 2022-01-31 RX ADMIN — DIPHENHYDRAMINE HYDROCHLORIDE 50 MG: 50 INJECTION, SOLUTION INTRAMUSCULAR; INTRAVENOUS at 11:43

## 2022-01-31 RX ADMIN — METHYLPREDNISOLONE SODIUM SUCCINATE 60 MG: 125 INJECTION, POWDER, FOR SOLUTION INTRAMUSCULAR; INTRAVENOUS at 11:40

## 2022-01-31 RX ADMIN — SODIUM CHLORIDE, SODIUM LACTATE, POTASSIUM CHLORIDE, AND CALCIUM CHLORIDE 1500 ML: .6; .31; .03; .02 INJECTION, SOLUTION INTRAVENOUS at 11:40

## 2022-02-01 ENCOUNTER — HOSPITAL ENCOUNTER (OUTPATIENT)
Dept: INFUSION CENTER | Facility: CLINIC | Age: 47
End: 2022-02-01

## 2022-02-03 ENCOUNTER — HOSPITAL ENCOUNTER (OUTPATIENT)
Dept: INFUSION CENTER | Facility: CLINIC | Age: 47
Discharge: HOME | End: 2022-02-03
Payer: COMMERCIAL

## 2022-02-03 VITALS
TEMPERATURE: 98.8 F | HEART RATE: 103 BPM | RESPIRATION RATE: 18 BRPM | DIASTOLIC BLOOD PRESSURE: 84 MMHG | SYSTOLIC BLOOD PRESSURE: 133 MMHG

## 2022-02-03 DIAGNOSIS — R19.7 DIARRHEA, UNSPECIFIED TYPE: ICD-10-CM

## 2022-02-03 DIAGNOSIS — R63.0 ANOREXIA: Primary | ICD-10-CM

## 2022-02-03 DIAGNOSIS — E44.1 MILD PROTEIN-CALORIE MALNUTRITION (HCC): ICD-10-CM

## 2022-02-03 PROCEDURE — 96361 HYDRATE IV INFUSION ADD-ON: CPT

## 2022-02-03 PROCEDURE — 96360 HYDRATION IV INFUSION INIT: CPT

## 2022-02-03 RX ORDER — SODIUM CHLORIDE, SODIUM LACTATE, POTASSIUM CHLORIDE, CALCIUM CHLORIDE 600; 310; 30; 20 MG/100ML; MG/100ML; MG/100ML; MG/100ML
1500 INJECTION, SOLUTION INTRAVENOUS ONCE
Status: COMPLETED | OUTPATIENT
Start: 2022-02-03 | End: 2022-02-03

## 2022-02-03 RX ORDER — METHYLPREDNISOLONE SODIUM SUCCINATE 125 MG/2ML
60 INJECTION, POWDER, LYOPHILIZED, FOR SOLUTION INTRAMUSCULAR; INTRAVENOUS ONCE
Status: CANCELLED
Start: 2022-02-06 | End: 2022-02-06

## 2022-02-03 RX ORDER — SODIUM CHLORIDE, SODIUM LACTATE, POTASSIUM CHLORIDE, CALCIUM CHLORIDE 600; 310; 30; 20 MG/100ML; MG/100ML; MG/100ML; MG/100ML
1500 INJECTION, SOLUTION INTRAVENOUS ONCE
Status: CANCELLED
Start: 2022-02-06 | End: 2022-02-06

## 2022-02-03 RX ADMIN — SODIUM CHLORIDE, SODIUM LACTATE, POTASSIUM CHLORIDE, AND CALCIUM CHLORIDE 1500 ML: .6; .31; .03; .02 INJECTION, SOLUTION INTRAVENOUS at 11:15

## 2022-02-07 NOTE — TELEPHONE ENCOUNTER
After further discussion with Dr Danisha Sauceda patient will remain with every 4 weeks dosing    I called and spoke with patient we went over update, patient understood    Patient advised her Dr Harden @ Rheumatology at [place] can get Luna Ruiz approved at every 3 weeks I advised  that she may share my phone number and provided our fax number where info can be sent to help our claim

## 2022-02-08 ENCOUNTER — HOSPITAL ENCOUNTER (OUTPATIENT)
Dept: INFUSION CENTER | Facility: CLINIC | Age: 47
Discharge: HOME | End: 2022-02-08
Payer: COMMERCIAL

## 2022-02-08 VITALS
TEMPERATURE: 97.3 F | HEART RATE: 98 BPM | DIASTOLIC BLOOD PRESSURE: 84 MMHG | SYSTOLIC BLOOD PRESSURE: 121 MMHG | RESPIRATION RATE: 18 BRPM

## 2022-02-08 DIAGNOSIS — E44.1 MILD PROTEIN-CALORIE MALNUTRITION (HCC): ICD-10-CM

## 2022-02-08 DIAGNOSIS — R63.0 ANOREXIA: Primary | ICD-10-CM

## 2022-02-08 DIAGNOSIS — R19.7 DIARRHEA, UNSPECIFIED TYPE: ICD-10-CM

## 2022-02-08 PROCEDURE — 96375 TX/PRO/DX INJ NEW DRUG ADDON: CPT

## 2022-02-08 PROCEDURE — 96361 HYDRATE IV INFUSION ADD-ON: CPT

## 2022-02-08 PROCEDURE — 96365 THER/PROPH/DIAG IV INF INIT: CPT

## 2022-02-08 RX ORDER — METHYLPREDNISOLONE SODIUM SUCCINATE 125 MG/2ML
60 INJECTION, POWDER, LYOPHILIZED, FOR SOLUTION INTRAMUSCULAR; INTRAVENOUS ONCE
Status: COMPLETED | OUTPATIENT
Start: 2022-02-08 | End: 2022-02-08

## 2022-02-08 RX ORDER — METHYLPREDNISOLONE SODIUM SUCCINATE 125 MG/2ML
60 INJECTION, POWDER, LYOPHILIZED, FOR SOLUTION INTRAMUSCULAR; INTRAVENOUS ONCE
Status: CANCELLED
Start: 2022-02-14 | End: 2022-02-09

## 2022-02-08 RX ORDER — SODIUM CHLORIDE, SODIUM LACTATE, POTASSIUM CHLORIDE, CALCIUM CHLORIDE 600; 310; 30; 20 MG/100ML; MG/100ML; MG/100ML; MG/100ML
1500 INJECTION, SOLUTION INTRAVENOUS ONCE
Status: COMPLETED | OUTPATIENT
Start: 2022-02-08 | End: 2022-02-08

## 2022-02-08 RX ORDER — SODIUM CHLORIDE, SODIUM LACTATE, POTASSIUM CHLORIDE, CALCIUM CHLORIDE 600; 310; 30; 20 MG/100ML; MG/100ML; MG/100ML; MG/100ML
1500 INJECTION, SOLUTION INTRAVENOUS ONCE
Status: CANCELLED
Start: 2022-02-09 | End: 2022-02-09

## 2022-02-08 RX ADMIN — SODIUM CHLORIDE, SODIUM LACTATE, POTASSIUM CHLORIDE, AND CALCIUM CHLORIDE 1500 ML: .6; .31; .03; .02 INJECTION, SOLUTION INTRAVENOUS at 12:28

## 2022-02-08 RX ADMIN — METHYLPREDNISOLONE SODIUM SUCCINATE 60 MG: 125 INJECTION, POWDER, FOR SOLUTION INTRAMUSCULAR; INTRAVENOUS at 11:59

## 2022-02-08 RX ADMIN — DIPHENHYDRAMINE HYDROCHLORIDE 50 MG: 50 INJECTION, SOLUTION INTRAMUSCULAR; INTRAVENOUS at 12:03

## 2022-02-10 ENCOUNTER — HOSPITAL ENCOUNTER (OUTPATIENT)
Dept: INFUSION CENTER | Facility: CLINIC | Age: 47
Discharge: HOME | End: 2022-02-10
Payer: COMMERCIAL

## 2022-02-10 DIAGNOSIS — R63.0 ANOREXIA: Primary | ICD-10-CM

## 2022-02-10 DIAGNOSIS — R19.7 DIARRHEA, UNSPECIFIED TYPE: ICD-10-CM

## 2022-02-10 DIAGNOSIS — E44.1 MILD PROTEIN-CALORIE MALNUTRITION (HCC): ICD-10-CM

## 2022-02-10 LAB
THYROGLOB AB SERPL-ACNC: 4.2 IU/ML (ref 0–0.9)
THYROGLOB SERPL-MCNC: 3.3 NG/ML

## 2022-02-10 PROCEDURE — 96361 HYDRATE IV INFUSION ADD-ON: CPT

## 2022-02-10 PROCEDURE — 96360 HYDRATION IV INFUSION INIT: CPT

## 2022-02-10 RX ORDER — SODIUM CHLORIDE, SODIUM LACTATE, POTASSIUM CHLORIDE, CALCIUM CHLORIDE 600; 310; 30; 20 MG/100ML; MG/100ML; MG/100ML; MG/100ML
1500 INJECTION, SOLUTION INTRAVENOUS ONCE
Status: COMPLETED | OUTPATIENT
Start: 2022-02-10 | End: 2022-02-10

## 2022-02-10 RX ORDER — SODIUM CHLORIDE, SODIUM LACTATE, POTASSIUM CHLORIDE, CALCIUM CHLORIDE 600; 310; 30; 20 MG/100ML; MG/100ML; MG/100ML; MG/100ML
1500 INJECTION, SOLUTION INTRAVENOUS ONCE
Status: CANCELLED
Start: 2022-02-14 | End: 2022-02-11

## 2022-02-10 RX ORDER — METHYLPREDNISOLONE SODIUM SUCCINATE 125 MG/2ML
60 INJECTION, POWDER, LYOPHILIZED, FOR SOLUTION INTRAMUSCULAR; INTRAVENOUS ONCE
Status: CANCELLED
Start: 2022-02-14 | End: 2022-02-11

## 2022-02-10 RX ADMIN — SODIUM CHLORIDE, SODIUM LACTATE, POTASSIUM CHLORIDE, AND CALCIUM CHLORIDE 1500 ML: .6; .31; .03; .02 INJECTION, SOLUTION INTRAVENOUS at 11:40

## 2022-02-10 NOTE — PROGRESS NOTES
Patient tolerated hydration infusion without incident  Pt is aware of all future appointments   Declined AVS

## 2022-02-14 ENCOUNTER — HOSPITAL ENCOUNTER (OUTPATIENT)
Dept: INFUSION CENTER | Facility: CLINIC | Age: 47
End: 2022-02-14

## 2022-02-17 ENCOUNTER — HOSPITAL ENCOUNTER (OUTPATIENT)
Dept: INFUSION CENTER | Facility: CLINIC | Age: 47
Discharge: HOME | End: 2022-02-17
Payer: COMMERCIAL

## 2022-02-17 VITALS
DIASTOLIC BLOOD PRESSURE: 83 MMHG | TEMPERATURE: 97.2 F | SYSTOLIC BLOOD PRESSURE: 115 MMHG | RESPIRATION RATE: 18 BRPM | HEART RATE: 102 BPM

## 2022-02-17 DIAGNOSIS — E44.1 MILD PROTEIN-CALORIE MALNUTRITION (HCC): ICD-10-CM

## 2022-02-17 DIAGNOSIS — R19.7 DIARRHEA, UNSPECIFIED TYPE: ICD-10-CM

## 2022-02-17 DIAGNOSIS — R63.0 ANOREXIA: Primary | ICD-10-CM

## 2022-02-17 PROCEDURE — 96365 THER/PROPH/DIAG IV INF INIT: CPT

## 2022-02-17 PROCEDURE — 96361 HYDRATE IV INFUSION ADD-ON: CPT

## 2022-02-17 PROCEDURE — 96375 TX/PRO/DX INJ NEW DRUG ADDON: CPT

## 2022-02-17 RX ORDER — METHYLPREDNISOLONE SODIUM SUCCINATE 125 MG/2ML
60 INJECTION, POWDER, LYOPHILIZED, FOR SOLUTION INTRAMUSCULAR; INTRAVENOUS ONCE
Status: CANCELLED
Start: 2022-02-21 | End: 2022-02-19

## 2022-02-17 RX ORDER — SODIUM CHLORIDE, SODIUM LACTATE, POTASSIUM CHLORIDE, CALCIUM CHLORIDE 600; 310; 30; 20 MG/100ML; MG/100ML; MG/100ML; MG/100ML
1500 INJECTION, SOLUTION INTRAVENOUS ONCE
Status: COMPLETED | OUTPATIENT
Start: 2022-02-17 | End: 2022-02-17

## 2022-02-17 RX ORDER — SODIUM CHLORIDE, SODIUM LACTATE, POTASSIUM CHLORIDE, CALCIUM CHLORIDE 600; 310; 30; 20 MG/100ML; MG/100ML; MG/100ML; MG/100ML
1500 INJECTION, SOLUTION INTRAVENOUS ONCE
Status: CANCELLED
Start: 2022-02-19 | End: 2022-02-19

## 2022-02-17 RX ORDER — METHYLPREDNISOLONE SODIUM SUCCINATE 125 MG/2ML
60 INJECTION, POWDER, LYOPHILIZED, FOR SOLUTION INTRAMUSCULAR; INTRAVENOUS ONCE
Status: COMPLETED | OUTPATIENT
Start: 2022-02-17 | End: 2022-02-17

## 2022-02-17 RX ADMIN — METHYLPREDNISOLONE SODIUM SUCCINATE 60 MG: 125 INJECTION, POWDER, FOR SOLUTION INTRAMUSCULAR; INTRAVENOUS at 12:54

## 2022-02-17 RX ADMIN — DIPHENHYDRAMINE HYDROCHLORIDE 50 MG: 50 INJECTION, SOLUTION INTRAMUSCULAR; INTRAVENOUS at 12:56

## 2022-02-17 RX ADMIN — SODIUM CHLORIDE, SODIUM LACTATE, POTASSIUM CHLORIDE, AND CALCIUM CHLORIDE 1500 ML: .6; .31; .03; .02 INJECTION, SOLUTION INTRAVENOUS at 13:12

## 2022-02-17 NOTE — PLAN OF CARE
Problem: Potential for Falls  Goal: Patient will remain free of falls  Description: INTERVENTIONS:  - Educate patient/family on patient safety including physical limitations  - Instruct patient to call for assistance with activity   - Consult OT/PT to assist with strengthening/mobility   - Keep Call bell within reach  - Keep bed low and locked with side rails adjusted as appropriate  - Keep care items and personal belongings within reach  - Initiate and maintain comfort rounds  - Make Fall Risk Sign visible to staff  - Offer Toileting every  Hours, in advance of need  - Initiate/Maintain larm  - Obtain necessary fall risk management equipment:   - Apply yellow socks and bracelet for high fall risk patients  - Consider moving patient to room near nurses station  Outcome: Progressing Eat healthy foods you enjoy. Apixaban/Eliquis DOES NOT have a special diet. Limit your alcohol intake.

## 2022-02-21 ENCOUNTER — HOSPITAL ENCOUNTER (OUTPATIENT)
Dept: INFUSION CENTER | Facility: CLINIC | Age: 47
Discharge: HOME | End: 2022-02-21
Payer: COMMERCIAL

## 2022-02-21 VITALS
TEMPERATURE: 97.8 F | RESPIRATION RATE: 18 BRPM | SYSTOLIC BLOOD PRESSURE: 152 MMHG | DIASTOLIC BLOOD PRESSURE: 89 MMHG | HEART RATE: 98 BPM

## 2022-02-21 DIAGNOSIS — R19.7 DIARRHEA, UNSPECIFIED TYPE: ICD-10-CM

## 2022-02-21 DIAGNOSIS — R63.0 ANOREXIA: Primary | ICD-10-CM

## 2022-02-21 DIAGNOSIS — E44.1 MILD PROTEIN-CALORIE MALNUTRITION (HCC): ICD-10-CM

## 2022-02-21 PROCEDURE — 96375 TX/PRO/DX INJ NEW DRUG ADDON: CPT

## 2022-02-21 PROCEDURE — 96361 HYDRATE IV INFUSION ADD-ON: CPT

## 2022-02-21 PROCEDURE — 96365 THER/PROPH/DIAG IV INF INIT: CPT

## 2022-02-21 RX ORDER — METHYLPREDNISOLONE SODIUM SUCCINATE 125 MG/2ML
60 INJECTION, POWDER, LYOPHILIZED, FOR SOLUTION INTRAMUSCULAR; INTRAVENOUS ONCE
Status: COMPLETED | OUTPATIENT
Start: 2022-02-21 | End: 2022-02-21

## 2022-02-21 RX ORDER — METHYLPREDNISOLONE SODIUM SUCCINATE 125 MG/2ML
60 INJECTION, POWDER, LYOPHILIZED, FOR SOLUTION INTRAMUSCULAR; INTRAVENOUS ONCE
Status: CANCELLED
Start: 2022-02-28 | End: 2022-02-23

## 2022-02-21 RX ORDER — SODIUM CHLORIDE, SODIUM LACTATE, POTASSIUM CHLORIDE, CALCIUM CHLORIDE 600; 310; 30; 20 MG/100ML; MG/100ML; MG/100ML; MG/100ML
1500 INJECTION, SOLUTION INTRAVENOUS ONCE
Status: CANCELLED
Start: 2022-02-24 | End: 2022-02-23

## 2022-02-21 RX ORDER — SODIUM CHLORIDE, SODIUM LACTATE, POTASSIUM CHLORIDE, CALCIUM CHLORIDE 600; 310; 30; 20 MG/100ML; MG/100ML; MG/100ML; MG/100ML
1500 INJECTION, SOLUTION INTRAVENOUS ONCE
Status: COMPLETED | OUTPATIENT
Start: 2022-02-21 | End: 2022-02-21

## 2022-02-21 RX ADMIN — SODIUM CHLORIDE, SODIUM LACTATE, POTASSIUM CHLORIDE, AND CALCIUM CHLORIDE 1000 ML: .6; .31; .03; .02 INJECTION, SOLUTION INTRAVENOUS at 12:01

## 2022-02-21 RX ADMIN — DIPHENHYDRAMINE HYDROCHLORIDE 50 MG: 50 INJECTION, SOLUTION INTRAMUSCULAR; INTRAVENOUS at 12:11

## 2022-02-21 RX ADMIN — METHYLPREDNISOLONE SODIUM SUCCINATE 60 MG: 125 INJECTION, POWDER, FOR SOLUTION INTRAMUSCULAR; INTRAVENOUS at 12:05

## 2022-02-21 NOTE — PROGRESS NOTES
Patient tolerated her hydration well without adverse reaction  She declined the full dose of 1,500ml because her finger and feet swell after  No swelling at this time  She did reach out to her dr and left him know

## 2022-02-23 ENCOUNTER — APPOINTMENT (OUTPATIENT)
Dept: LAB | Facility: HOSPITAL | Age: 47
End: 2022-02-23
Attending: INTERNAL MEDICINE
Payer: COMMERCIAL

## 2022-02-23 DIAGNOSIS — K52.9 INFLAMMATORY BOWEL DISEASE: ICD-10-CM

## 2022-02-23 DIAGNOSIS — K50.119 CROHN'S DISEASE OF LARGE INTESTINE WITH COMPLICATION (HCC): ICD-10-CM

## 2022-02-23 DIAGNOSIS — L50.9 URTICARIA, UNSPECIFIED: ICD-10-CM

## 2022-02-23 DIAGNOSIS — R73.9 BLOOD GLUCOSE ELEVATED: ICD-10-CM

## 2022-02-23 DIAGNOSIS — D84.9 IMMUNOSUPPRESSION-RELATED INFECTIOUS DISEASE (HCC): ICD-10-CM

## 2022-02-23 DIAGNOSIS — D84.9 DEFICIENCY SYNDROME, IMMUNOLOGIC (HCC): ICD-10-CM

## 2022-02-23 DIAGNOSIS — R19.7 DIARRHEA, UNSPECIFIED TYPE: ICD-10-CM

## 2022-02-23 DIAGNOSIS — B99.8 IMMUNOSUPPRESSION-RELATED INFECTIOUS DISEASE (HCC): ICD-10-CM

## 2022-02-23 LAB
ALBUMIN SERPL BCP-MCNC: 3.8 G/DL (ref 3.5–5)
ALP SERPL-CCNC: 50 U/L (ref 46–116)
ALT SERPL W P-5'-P-CCNC: 21 U/L (ref 12–78)
ANION GAP SERPL CALCULATED.3IONS-SCNC: 12 MMOL/L (ref 4–13)
AST SERPL W P-5'-P-CCNC: 17 U/L (ref 5–45)
BASOPHILS # BLD AUTO: 0.1 THOUSANDS/ΜL (ref 0–0.1)
BASOPHILS NFR BLD AUTO: 1 % (ref 0–1)
BILIRUB SERPL-MCNC: 0.3 MG/DL (ref 0.2–1)
BUN SERPL-MCNC: 9 MG/DL (ref 5–25)
CALCIUM SERPL-MCNC: 8.4 MG/DL (ref 8.3–10.1)
CHLORIDE SERPL-SCNC: 106 MMOL/L (ref 100–108)
CO2 SERPL-SCNC: 25 MMOL/L (ref 21–32)
CREAT SERPL-MCNC: 0.7 MG/DL (ref 0.6–1.3)
CRP SERPL QL: 0.6 MG/L
EOSINOPHIL # BLD AUTO: 0.07 THOUSAND/ΜL (ref 0–0.61)
EOSINOPHIL NFR BLD AUTO: 1 % (ref 0–6)
ERYTHROCYTE [DISTWIDTH] IN BLOOD BY AUTOMATED COUNT: 13.5 % (ref 11.6–15.1)
GFR SERPL CREATININE-BSD FRML MDRD: 102 ML/MIN/1.73SQ M
GLUCOSE SERPL-MCNC: 94 MG/DL (ref 65–140)
HCT VFR BLD AUTO: 40 % (ref 34.8–46.1)
HGB BLD-MCNC: 13.5 G/DL (ref 11.5–15.4)
IMM GRANULOCYTES # BLD AUTO: 0.02 THOUSAND/UL (ref 0–0.2)
IMM GRANULOCYTES NFR BLD AUTO: 0 % (ref 0–2)
LYMPHOCYTES # BLD AUTO: 2.92 THOUSANDS/ΜL (ref 0.6–4.47)
LYMPHOCYTES NFR BLD AUTO: 33 % (ref 14–44)
MCH RBC QN AUTO: 30.5 PG (ref 26.8–34.3)
MCHC RBC AUTO-ENTMCNC: 33.8 G/DL (ref 31.4–37.4)
MCV RBC AUTO: 90 FL (ref 82–98)
MONOCYTES # BLD AUTO: 0.46 THOUSAND/ΜL (ref 0.17–1.22)
MONOCYTES NFR BLD AUTO: 5 % (ref 4–12)
NEUTROPHILS # BLD AUTO: 5.22 THOUSANDS/ΜL (ref 1.85–7.62)
NEUTS SEG NFR BLD AUTO: 60 % (ref 43–75)
NRBC BLD AUTO-RTO: 0 /100 WBCS
PLATELET # BLD AUTO: 393 THOUSANDS/UL (ref 149–390)
PMV BLD AUTO: 8.9 FL (ref 8.9–12.7)
POTASSIUM SERPL-SCNC: 3.2 MMOL/L (ref 3.5–5.3)
PROT SERPL-MCNC: 8.1 G/DL (ref 6.4–8.2)
RBC # BLD AUTO: 4.43 MILLION/UL (ref 3.81–5.12)
SODIUM SERPL-SCNC: 143 MMOL/L (ref 136–145)
TSH SERPL DL<=0.05 MIU/L-ACNC: 0.63 UIU/ML (ref 0.36–3.74)
WBC # BLD AUTO: 8.79 THOUSAND/UL (ref 4.31–10.16)

## 2022-02-23 PROCEDURE — 85025 COMPLETE CBC W/AUTO DIFF WBC: CPT

## 2022-02-23 PROCEDURE — 83036 HEMOGLOBIN GLYCOSYLATED A1C: CPT

## 2022-02-23 PROCEDURE — 84432 ASSAY OF THYROGLOBULIN: CPT

## 2022-02-23 PROCEDURE — 84481 FREE ASSAY (FT-3): CPT

## 2022-02-23 PROCEDURE — 83520 IMMUNOASSAY QUANT NOS NONAB: CPT

## 2022-02-23 PROCEDURE — 86800 THYROGLOBULIN ANTIBODY: CPT

## 2022-02-23 PROCEDURE — 83993 ASSAY FOR CALPROTECTIN FECAL: CPT

## 2022-02-23 PROCEDURE — 83525 ASSAY OF INSULIN: CPT

## 2022-02-23 PROCEDURE — 84443 ASSAY THYROID STIM HORMONE: CPT

## 2022-02-23 PROCEDURE — 86140 C-REACTIVE PROTEIN: CPT

## 2022-02-23 PROCEDURE — 36415 COLL VENOUS BLD VENIPUNCTURE: CPT

## 2022-02-23 PROCEDURE — 84439 ASSAY OF FREE THYROXINE: CPT

## 2022-02-23 PROCEDURE — 80053 COMPREHEN METABOLIC PANEL: CPT

## 2022-02-24 ENCOUNTER — HOSPITAL ENCOUNTER (OUTPATIENT)
Dept: INFUSION CENTER | Facility: CLINIC | Age: 47
Discharge: HOME | End: 2022-02-24
Payer: COMMERCIAL

## 2022-02-24 ENCOUNTER — TELEPHONE (OUTPATIENT)
Dept: GASTROENTEROLOGY | Facility: CLINIC | Age: 47
End: 2022-02-24

## 2022-02-24 ENCOUNTER — TELEPHONE (OUTPATIENT)
Dept: GASTROENTEROLOGY | Facility: MEDICAL CENTER | Age: 47
End: 2022-02-24

## 2022-02-24 ENCOUNTER — OFFICE VISIT (OUTPATIENT)
Dept: CARDIOLOGY CLINIC | Facility: CLINIC | Age: 47
End: 2022-02-24
Payer: COMMERCIAL

## 2022-02-24 VITALS
TEMPERATURE: 96.6 F | DIASTOLIC BLOOD PRESSURE: 88 MMHG | HEART RATE: 94 BPM | RESPIRATION RATE: 18 BRPM | SYSTOLIC BLOOD PRESSURE: 147 MMHG

## 2022-02-24 DIAGNOSIS — R00.2 HEART PALPITATIONS: ICD-10-CM

## 2022-02-24 DIAGNOSIS — E44.1 MILD PROTEIN-CALORIE MALNUTRITION (HCC): ICD-10-CM

## 2022-02-24 DIAGNOSIS — R19.7 DIARRHEA, UNSPECIFIED TYPE: ICD-10-CM

## 2022-02-24 DIAGNOSIS — I47.1 ATRIAL TACHYCARDIA (HCC): Primary | ICD-10-CM

## 2022-02-24 DIAGNOSIS — I50.30 DIASTOLIC CONGESTIVE HEART FAILURE, UNSPECIFIED HF CHRONICITY (HCC): ICD-10-CM

## 2022-02-24 DIAGNOSIS — R63.0 ANOREXIA: Primary | ICD-10-CM

## 2022-02-24 PROBLEM — I47.19 ATRIAL TACHYCARDIA: Status: ACTIVE | Noted: 2022-02-24

## 2022-02-24 LAB
EST. AVERAGE GLUCOSE BLD GHB EST-MCNC: 100 MG/DL
HBA1C MFR BLD: 5.1 %
INSULIN SERPL-ACNC: 8.9 MU/L (ref 3–25)
T3FREE SERPL-MCNC: 2.48 PG/ML (ref 2.3–4.2)
T4 FREE SERPL-MCNC: 1.06 NG/DL (ref 0.76–1.46)

## 2022-02-24 PROCEDURE — 93000 ELECTROCARDIOGRAM COMPLETE: CPT | Performed by: INTERNAL MEDICINE

## 2022-02-24 PROCEDURE — 96361 HYDRATE IV INFUSION ADD-ON: CPT

## 2022-02-24 PROCEDURE — 96360 HYDRATION IV INFUSION INIT: CPT

## 2022-02-24 PROCEDURE — 1036F TOBACCO NON-USER: CPT | Performed by: INTERNAL MEDICINE

## 2022-02-24 PROCEDURE — 99214 OFFICE O/P EST MOD 30 MIN: CPT | Performed by: INTERNAL MEDICINE

## 2022-02-24 RX ORDER — SODIUM CHLORIDE, SODIUM LACTATE, POTASSIUM CHLORIDE, CALCIUM CHLORIDE 600; 310; 30; 20 MG/100ML; MG/100ML; MG/100ML; MG/100ML
1500 INJECTION, SOLUTION INTRAVENOUS ONCE
Status: CANCELLED
Start: 2022-02-27 | End: 2022-02-27

## 2022-02-24 RX ORDER — SODIUM CHLORIDE, SODIUM LACTATE, POTASSIUM CHLORIDE, CALCIUM CHLORIDE 600; 310; 30; 20 MG/100ML; MG/100ML; MG/100ML; MG/100ML
1500 INJECTION, SOLUTION INTRAVENOUS ONCE
Status: COMPLETED | OUTPATIENT
Start: 2022-02-24 | End: 2022-02-24

## 2022-02-24 RX ORDER — METHYLPREDNISOLONE SODIUM SUCCINATE 125 MG/2ML
60 INJECTION, POWDER, LYOPHILIZED, FOR SOLUTION INTRAMUSCULAR; INTRAVENOUS ONCE
Status: CANCELLED
Start: 2022-02-28 | End: 2022-02-27

## 2022-02-24 RX ADMIN — SODIUM CHLORIDE, SODIUM LACTATE, POTASSIUM CHLORIDE, AND CALCIUM CHLORIDE 1500 ML: .6; .31; .03; .02 INJECTION, SOLUTION INTRAVENOUS at 11:08

## 2022-02-24 NOTE — TELEPHONE ENCOUNTER
Relayed the provider's communication to the patient via phone  Patient went through her health history with me explained that Rheumatology had success getting Stelara approved at 21 day dosing  I explained exclusively from a GI perspective that I have never seen that frequency be approved due to the fact that insurance companies are reluctant to approve 28 day dosing because that isn't even FDA approved  Patient was not happy with the outcome and said that she will talk to Dr Vidhi Pacheco at her appointment in April

## 2022-02-24 NOTE — TELEPHONE ENCOUNTER
Hi all! Ken Hall stopped by the [facility] office to discuss her Vero Segura being moved to every 21 days vs 28 days  She said that it had been denied, but her rheumatologist (at [facility]) believes that it should be approved, she said she could give you the info for her rheumatologist so they could explain how they get it approved  I did advise her I'm not certain if that's protocol to call another health system to ask about how they get approvals, but did advise her I would gladly pass along the message  If you could call her at [phone]    Thanks so much!

## 2022-02-24 NOTE — TELEPHONE ENCOUNTER
Hi all!     Arturo Harding stopped by the [facility] office to discuss her Yong Aguirre being moved to every 21 days vs 28 days  She said that it had been denied, but her rheumatologist (at [facility]) believes that it should be approved, she said she could give you the info for her rheumatologist so they could explain how they get it approved  I did advise her I'm not certain if that's protocol to call another health system to ask about how they get approvals, but did advise her I would gladly pass along the message      If you could call her at [phone]     Thanks so much!          Documentation

## 2022-02-24 NOTE — PROGRESS NOTES
Cardiology Follow Up    Devyn Daily  1975  [id number]  [address] [address]    1  Atrial tachycardia ([address] )  Echo follow up/limited w/ contrast if indicated    Holter monitor   2  Diastolic congestive heart failure, unspecified HF chronicity (HCC)  POCT ECG   3  Heart palpitations  Echo follow up/limited w/ contrast if indicated    Holter monitor       Discussion/Summary:   She presents today for an acute visit  She recently had COVID-19  Following this she had increased palpitations  She has had diagnosed atrial tachycardia in the past I suspect this is become more frequent with the COVID-19 infection  Will check a limited echo to make sure there has been no drop in LV function and a Holter monitor to evaluate palpitations  Increase hydration electrolytes support  Potassium was 3 2 yesterday we talked about increasing oral intake  Interval History:  20-year-old female I met in the hospital from iatrogenic fluid overload  Overall she has been doing well from a cardiac standpoint  We ordered a echocardiogram last year which showed normal diastolic parameters and stress echo showed no evidence of ischemia  She has 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  From a cardiac standpoint she has no anginal symptoms  She has no lower extremity edema, PND, orthopnea  His been no palpitations  Since last visit she developed a COVID 19 infection  Following this she has had increased palpitations and fluttering within her chest   Denies any exertional symptoms  There has been no exertional chest pressure heaviness  Been no lightheadedness dizziness or syncope  Denies any lower extremity edema, PND, orthopnea      Problem List     Chondromalacia    Crohn's colitis ([facility] )    Ulcerative colitis ([facility] [address] )    Vitamin D deficiency Hypokalemia    Hypotension    Throat tightness    Septic shock (HCC)    Bradycardia    Abdominal pain    Headache    Hypophosphatemia    Hypomagnesemia    Generalized anxiety disorder (Chronic)    Hypothyroidism (Chronic)    Low TSH level    Hypocalcemia    Hyperglycemia    Acute respiratory failure with hypoxia (HCC)    Bilateral pleural effusion    Acute cardiogenic pulmonary edema (HCC)    Elevated troponin level    Abnormal CT of the abdomen    Leukocytosis    Arm swelling    Thrombophlebitis    Dysuria    Anxiety    CHF (congestive heart failure) (HCC)    Fibromyalgia    Mast cell disorder    Meniere disease    Visual disturbance    Chronic idiopathic urticaria    Disorder of synovium        Past Medical History:   Diagnosis Date    Anxiety     Asthma     Chronic kidney disease     Deep vein thrombosis (HCC)     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 )     NSTEMI  pt denies, documented in cardio note    Pancreatitis     sphinger of     Psychiatric disorder     RA (rheumatoid arthritis) ([facility] [address] [facility] )     Ulcerative colitis ([facility] )      Social History     Socioeconomic History    Marital status: /Civil Union     Spouse name: Not on file    Number of children: Not on file    Years of education: Not on file    Highest education level: Not on file   Occupational History    Not on file   Tobacco Use    Smoking status: Never Smoker    Smokeless tobacco: Never Used   Vaping Use    Vaping Use: Never used   Substance and Sexual Activity    Alcohol use: Never    Drug use: Not Currently    Sexual activity: Yes     Partners: Male   Other Topics Concern    Not on file   Social History Narrative    Not on file     Social Determinants of Health     Financial Resource Strain: Not on file   Food Insecurity: No Food Insecurity    Worried About Running Out of Food in the Last Year: Never true   [address] in the Last Year: Never true   Transportation Needs: No Transportation Needs    Lack of Transportation (Medical): No    Lack of Transportation (Non-Medical): No   Physical Activity: Not on file   Stress: Not on file   Social Connections: Not on file   Intimate Partner Violence: Not on file   Housing Stability: [address] Unable to Pay for Housing in the Last Year: No    Number of Places Lived in the Last Year: 1    Unstable Housing in the Last Year: No      Family History   Problem Relation Age of Onset    Ulcerative colitis Mother     Heart failure Father     Neuropathy Father     Macular degeneration Father     Diabetes Father     Asthma Daughter     Hypothyroidism Daughter     Heart disease Maternal Grandmother     Arthritis Maternal Grandmother     Arthritis Paternal Grandmother     Macular degeneration Paternal Grandmother     Lymphoma Paternal Grandfather     Heart failure Paternal Aunt     Heart failure Paternal [de-identified]     Breast cancer Paternal Aunt 47    Breast cancer additional onset Paternal Aunt 62    Hypothyroidism Paternal Aunt     Breast cancer Maternal Aunt     No Known Problems Maternal Grandfather      Past Surgical History:   Procedure Laterality Date    APPENDECTOMY      CHOLECYSTECTOMY      EGD AND COLONOSCOPY N/A 9/12/2017    Procedure: EGD AND COLONOSCOPY;  Surgeon: Ganesh Tracy MD;  [facility]: [facility]; Service: Gastroenterology    ERCP N/A 9/15/2017    Procedure: ENDOSCOPIC RETROGRADE CHOLANGIOPANCREATOGRAPHY (ERCP); Surgeon: Truman Soler MD;  [facility];   Service: Gastroenterology    HYSTERECTOMY      KNEE SURGERY Right     LAPAROSCOPIC ENDOMETRIOSIS FULGURATION      ORIF ANKLE FRACTURE Left     CO KNEE SCOPE,MED/LAT MENISECTOMY Left 5/12/2017    Procedure: POSSIBLE MEDIAL MENISECTOMY;  Surgeon: Theodore Wagoner MD;  Location: [facility];  Service: Orthopedics    CO KNEE SCOPE,SHAVE ARTICULAR CART Left 5/12/2017    Procedure: KNEE ARTHROSCOPY EVALUATION, CHONDROPLASTY;  Surgeon: Nancy Hutchinson MD;  Location: [facility];  Service: Orthopedics    MO LAP,DIAGNOSTIC ABDOMEN N/A 12/12/2017    Procedure: LAPAROSCOPY DIAGNOSTIC  WITH LYSIS OF ADHESIONS;  Surgeon: Subha Esquivel DO;  Location:  MAIN OR;  Service: General       Current Outpatient Medications:     acetaminophen (TYLENOL) 500 mg tablet, Take 1,000 mg by mouth every 6 (six) hours as needed for mild pain (Patient not taking: Reported on 12/27/2021 ), Disp: , Rfl:     butalbital-acetaminophen-caffeine (FIORICET,ESGIC) -40 mg per tablet, TAKE ONE TABLET BY MOUTH EVERY 8 HOURS AS NEEDED FOR HEADACHES, Disp: 20 tablet, Rfl: 4    cetirizine (ZyrTEC) 10 mg tablet, Take 20 mg by mouth daily , Disp: , Rfl:     cholecalciferol (VITAMIN D) 400 units/mL, Take 2 5 mL (1,000 Units total) by mouth daily (Patient not taking: Reported on 12/27/2021 ), Disp: 1 Bottle, Rfl: 5    cyanocobalamin 1,000 mcg/mL, , Disp: , Rfl:     diclofenac (VOLTAREN) 75 mg EC tablet, Take 1 tablet (75 mg total) by mouth 2 (two) times a day for 21 days (Patient taking differently: Take 75 mg by mouth as needed ), Disp: 42 tablet, Rfl: 2    diphenhydrAMINE (BENADRYL) 25 mg tablet, Take 50 mg by mouth 2 (two) times a day , Disp: , Rfl:     EPINEPHrine (EPIPEN 2-MARYSOL IJ), EpiPen  prn, Disp: , Rfl:     ergocalciferol (VITAMIN D2) 50,000 units, , Disp: , Rfl:     ergocalciferol (VITAMIN D2) 50,000 units,  Start: 02/22/21 17:37:00 EST, See Instructions, 50,000 Int_Unit PO 1 time per week (Patient not taking: Reported on 12/27/2021), Disp: , Rfl:     famotidine (PEPCID) 40 MG tablet, Take 1 tablet (40 mg total) by mouth daily, Disp: 30 tablet, Rfl: 3    hydrOXYzine HCL (ATARAX) 25 mg tablet, Take 25 mg by mouth 4 (four) times a day , Disp: , Rfl:     levalbuterol (XOPENEX HFA) 45 mcg/act inhaler, Inhale 1-2 puffs every 4 (four) hours as needed for shortness of breath (Patient not taking: Reported on 12/27/2021 ), Disp: , Rfl:     levothyroxine 50 mcg tablet, Take 50 mcg by mouth daily, Disp: , Rfl:     linaGLIPtin 5 MG TABS, Take 5 mg by mouth daily, Disp: , Rfl:     liothyronine (CYTOMEL) 5 mcg tablet, Take 5 mcg by mouth daily, Disp: , Rfl:     LORazepam (ATIVAN) 1 mg tablet, Take 1 tablet (1 mg total) by mouth every 8 (eight) hours as needed for anxiety, Disp: 21 tablet, Rfl: 0    montelukast (SINGULAIR) 10 mg tablet, Take 1 tablet (10 mg total) by mouth daily at bedtime, Disp: 30 tablet, Rfl: 0    NON FORMULARY, immunoglobin sq 6g 30 ml once a week (Patient not taking: Reported on 12/27/2021 ), Disp: , Rfl:     omalizumab (XOLAIR) 150 mg, Inject 300 mg under the skin every 28 days , Disp: , Rfl:     ondansetron (ZOFRAN-ODT) 4 mg disintegrating tablet, Take 1 tablet (4 mg total) by mouth every 6 (six) hours as needed for nausea or vomiting, Disp: 20 tablet, Rfl: 0    pantoprazole (PROTONIX) 40 mg tablet, Take 1 tablet (40 mg total) by mouth daily (Patient taking differently: Take 40 mg by mouth 2 (two) times a day  ), Disp: 30 tablet, Rfl: 3    predniSONE 20 mg tablet, Taper as per physician instructions (Patient taking differently: 40 mg Taper as per physician instructions ), Disp: 100 tablet, Rfl: 0    Riboflavin (Vitamin B-2) 100 MG TABS, Take 4 tablets (400 mg total) by mouth daily, Disp: 360 tablet, Rfl: 3    simethicone (MYLICON) 716 MG chewable tablet, Chew 125 mg every 6 (six) hours as needed for flatulence (Patient not taking: Reported on 12/3/2021 ), Disp: , Rfl:     Stelara 90 MG/ML subcutaneous injection, 90mg inject subcutaneously every 28 days, Disp: 1 mL, Rfl: 6    temazepam (RESTORIL) 30 mg capsule, Take 1 capsule (30 mg total) by mouth daily at bedtime as needed for sleep, Disp: 30 capsule, Rfl: 5  No current facility-administered medications for this visit    Allergies   Allergen Reactions    Amitriptyline Anaphylaxis     According to the patient she had nphylaxis    Aspergillus Fumigatus Other (See Comments), Hives and Swelling    Azathioprine Other (See Comments) and Anaphylaxis     pancreatitis  According to patient she needed Epipen    Buspar [Buspirone] Hives and Shortness Of Breath    Corn Dextrin Anaphylaxis     Any corn based products    Eggs Or Egg-Derived Products - Food Allergy Anaphylaxis    Gelatin - Food Allergy Anaphylaxis    Lactated Ringers Shortness Of Breath     Pt questions this allergy, pt has received LR multiple times without reaction    Malto Dextrin [Dextrin] Anaphylaxis    Other Anaphylaxis     Gel caps, maltodextrose, dextrose,grapes    Penicillium Notatum Shortness Of Breath and Swelling    Sumatriptan Anaphylaxis     Bradycardia, sob, back pressure, head pain,throat tightness  According to the patient she had anphylaxis    Contrast [Iodinated Diagnostic Agents] Other (See Comments)     Pt states needs to be premedicated prior to injection   [employer] - Food Allergy - Food Allergy Hives    Humira [Adalimumab] Other (See Comments)     "I develop drug induced lupus"    Ibuprofen Hives and Throat Swelling    Remicade [Infliximab] Other (See Comments)     "I develop drug induced lupus"    Sulfa Antibiotics Hives and Other (See Comments)    Sulfate Hives    Sulfites - Food Allergy Hives    Chlorhexidine Itching    Histamine Hives, Rash and Other (See Comments)     Intolerance         Labs:     Chemistry        Component Value Date/Time     01/16/2015 1213    K 3 2 (L) 02/23/2022 1703    K 4 2 01/16/2015 1213     02/23/2022 1703     01/16/2015 1213    CO2 25 02/23/2022 1703    CO2 29 1 01/16/2015 1213    BUN 9 02/23/2022 1703    BUN 10 01/16/2015 1213    CREATININE 0 70 02/23/2022 1703    CREATININE 0 67 01/16/2015 1213        Component Value Date/Time    CALCIUM 8 4 02/23/2022 1703    CALCIUM 9 6 01/16/2015 1213    ALKPHOS 50 02/23/2022 1703    ALKPHOS 97 01/16/2015 1213    AST 17 02/23/2022 1703    AST 11 01/16/2015 1213    ALT 21 02/23/2022 1703    ALT 51 01/16/2015 1213    BILITOT 0 7 01/16/2015 1213            No results found for: CHOL  Lab Results   Component Value Date    HDL 75 10/25/2019    HDL 54 02/13/2019    HDL 61 (H) 12/06/2017     Lab Results   Component Value Date    LDLCALC 115 (H) 10/25/2019    LDLCALC 62 02/13/2019    LDLCALC 79 12/06/2017     Lab Results   Component Value Date    TRIG 52 10/25/2019    TRIG 84 02/13/2019    TRIG 45 12/06/2017     No results found for: CHOLHDL    Imaging: No results found  ECG:  Normal sinus rhythm unremarkable tracing      ROS    There were no vitals filed for this visit  There were no vitals filed for this visit  There is no height or weight on file to calculate BMI  Physical Exam:  Vital signs reviewed  General:  Alert and cooperative, appears stated age, no acute distress  HEENT:  PERRLA, EOMI, no scleral icterus, no conjunctival pallor  Neck:  No lymphadenopathy, no thyromegaly, no carotid bruits, no elevated JVP  Heart:  Regular rate and rhythm, normal S1/S2, no S3/S4, no murmur, rubs or gallops  PMI nondisplaced  Lungs:  Clear to auscultation bilaterally, no wheezes rales or rhonchi  Abdomen:  Soft, non-tender, positive bowel sounds, no rebound or guarding,   no organomegaly   Extremities:  Normal range of motion    No clubbing, cyanosis or edema   Vascular:  2+ pedal pulses  Skin:  No rashes or lesions on exposed skin  Neurologic:  Cranial nerves II-XII grossly intact without focal deficits  Psych:  Normal mood and affect

## 2022-02-24 NOTE — TELEPHONE ENCOUNTER
Per Dr Lashay Solo: Remain on Stelara 90mg q 28 days  Call the patient at the number provided and couldn't leave a vm due to the voicemail box being full  I will try again at a later time

## 2022-02-27 LAB — TRYPTASE SERPL-MCNC: 10.9 UG/L (ref 2.2–13.2)

## 2022-02-28 ENCOUNTER — HOSPITAL ENCOUNTER (OUTPATIENT)
Dept: INFUSION CENTER | Facility: CLINIC | Age: 47
Discharge: HOME | End: 2022-02-28
Payer: COMMERCIAL

## 2022-02-28 VITALS
TEMPERATURE: 97.8 F | DIASTOLIC BLOOD PRESSURE: 84 MMHG | HEART RATE: 96 BPM | RESPIRATION RATE: 18 BRPM | SYSTOLIC BLOOD PRESSURE: 121 MMHG

## 2022-02-28 DIAGNOSIS — R19.7 DIARRHEA, UNSPECIFIED TYPE: ICD-10-CM

## 2022-02-28 DIAGNOSIS — R63.0 ANOREXIA: Primary | ICD-10-CM

## 2022-02-28 DIAGNOSIS — E44.1 MILD PROTEIN-CALORIE MALNUTRITION (HCC): ICD-10-CM

## 2022-02-28 PROCEDURE — 96361 HYDRATE IV INFUSION ADD-ON: CPT

## 2022-02-28 PROCEDURE — 96375 TX/PRO/DX INJ NEW DRUG ADDON: CPT

## 2022-02-28 PROCEDURE — 96365 THER/PROPH/DIAG IV INF INIT: CPT

## 2022-02-28 RX ORDER — METHYLPREDNISOLONE SODIUM SUCCINATE 125 MG/2ML
60 INJECTION, POWDER, LYOPHILIZED, FOR SOLUTION INTRAMUSCULAR; INTRAVENOUS ONCE
Status: COMPLETED | OUTPATIENT
Start: 2022-02-28 | End: 2022-02-28

## 2022-02-28 RX ORDER — SODIUM CHLORIDE, SODIUM LACTATE, POTASSIUM CHLORIDE, CALCIUM CHLORIDE 600; 310; 30; 20 MG/100ML; MG/100ML; MG/100ML; MG/100ML
1500 INJECTION, SOLUTION INTRAVENOUS ONCE
Status: COMPLETED | OUTPATIENT
Start: 2022-02-28 | End: 2022-02-28

## 2022-02-28 RX ORDER — SODIUM CHLORIDE, SODIUM LACTATE, POTASSIUM CHLORIDE, CALCIUM CHLORIDE 600; 310; 30; 20 MG/100ML; MG/100ML; MG/100ML; MG/100ML
1500 INJECTION, SOLUTION INTRAVENOUS ONCE
Status: CANCELLED
Start: 2022-03-04 | End: 2022-03-02

## 2022-02-28 RX ORDER — METHYLPREDNISOLONE SODIUM SUCCINATE 125 MG/2ML
60 INJECTION, POWDER, LYOPHILIZED, FOR SOLUTION INTRAMUSCULAR; INTRAVENOUS ONCE
Status: CANCELLED
Start: 2022-03-02 | End: 2022-03-02

## 2022-02-28 RX ADMIN — DIPHENHYDRAMINE HYDROCHLORIDE 50 MG: 50 INJECTION, SOLUTION INTRAMUSCULAR; INTRAVENOUS at 11:42

## 2022-02-28 RX ADMIN — METHYLPREDNISOLONE SODIUM SUCCINATE 60 MG: 125 INJECTION, POWDER, FOR SOLUTION INTRAMUSCULAR; INTRAVENOUS at 12:07

## 2022-02-28 RX ADMIN — SODIUM CHLORIDE, SODIUM LACTATE, POTASSIUM CHLORIDE, AND CALCIUM CHLORIDE 1500 ML: .6; .31; .03; .02 INJECTION, SOLUTION INTRAVENOUS at 12:08

## 2022-02-28 NOTE — PROGRESS NOTES
Patient arrived on unit for hydration  Denies any present  issues/concerns  Tolerated hydration without incident  Aware of next appointment   Declined AVS

## 2022-03-01 DIAGNOSIS — R00.2 PALPITATION: ICD-10-CM

## 2022-03-01 DIAGNOSIS — K52.9 INFLAMMATORY BOWEL DISEASE: ICD-10-CM

## 2022-03-01 LAB — CALPROTECTIN STL-MCNT: 59 UG/G (ref 0–120)

## 2022-03-01 RX ORDER — BUTALBITAL, ACETAMINOPHEN AND CAFFEINE 50; 325; 40 MG/1; MG/1; MG/1
TABLET ORAL
Qty: 20 TABLET | Refills: 2 | OUTPATIENT
Start: 2022-03-01

## 2022-03-01 RX ORDER — BUDESONIDE 3 MG/1
CAPSULE, COATED PELLETS ORAL
Qty: 90 CAPSULE | Refills: 2 | Status: SHIPPED | OUTPATIENT
Start: 2022-03-01 | End: 2022-04-14

## 2022-03-04 ENCOUNTER — APPOINTMENT (OUTPATIENT)
Dept: LAB | Facility: HOSPITAL | Age: 47
End: 2022-03-04
Payer: COMMERCIAL

## 2022-03-04 ENCOUNTER — HOSPITAL ENCOUNTER (OUTPATIENT)
Dept: INFUSION CENTER | Facility: CLINIC | Age: 47
Discharge: HOME | End: 2022-03-04
Payer: COMMERCIAL

## 2022-03-04 VITALS
TEMPERATURE: 96.5 F | RESPIRATION RATE: 18 BRPM | HEART RATE: 89 BPM | DIASTOLIC BLOOD PRESSURE: 82 MMHG | SYSTOLIC BLOOD PRESSURE: 136 MMHG

## 2022-03-04 DIAGNOSIS — D89.40 MAST CELL ACTIVATION (HCC): ICD-10-CM

## 2022-03-04 DIAGNOSIS — R73.9 BLOOD GLUCOSE ELEVATED: ICD-10-CM

## 2022-03-04 DIAGNOSIS — R19.7 DIARRHEA, UNSPECIFIED TYPE: ICD-10-CM

## 2022-03-04 DIAGNOSIS — E03.2 IATROGENIC HYPOTHYROIDISM: ICD-10-CM

## 2022-03-04 DIAGNOSIS — R63.0 ANOREXIA: Primary | ICD-10-CM

## 2022-03-04 DIAGNOSIS — L50.9 URTICARIA, UNSPECIFIED: ICD-10-CM

## 2022-03-04 DIAGNOSIS — E44.1 MILD PROTEIN-CALORIE MALNUTRITION (HCC): ICD-10-CM

## 2022-03-04 DIAGNOSIS — E55.9 VITAMIN D DEFICIENCY: ICD-10-CM

## 2022-03-04 LAB
ALBUMIN SERPL BCP-MCNC: 3.9 G/DL (ref 3.5–5)
ALP SERPL-CCNC: 64 U/L (ref 46–116)
ALT SERPL W P-5'-P-CCNC: 20 U/L (ref 12–78)
ANION GAP SERPL CALCULATED.3IONS-SCNC: 12 MMOL/L (ref 4–13)
AST SERPL W P-5'-P-CCNC: 17 U/L (ref 5–45)
BASOPHILS # BLD AUTO: 0.04 THOUSANDS/ΜL (ref 0–0.1)
BASOPHILS NFR BLD AUTO: 0 % (ref 0–1)
BILIRUB SERPL-MCNC: 0.29 MG/DL (ref 0.2–1)
BUN SERPL-MCNC: 10 MG/DL (ref 5–25)
CALCIUM SERPL-MCNC: 8.8 MG/DL (ref 8.3–10.1)
CHLORIDE SERPL-SCNC: 102 MMOL/L (ref 100–108)
CO2 SERPL-SCNC: 24 MMOL/L (ref 21–32)
CREAT SERPL-MCNC: 0.82 MG/DL (ref 0.6–1.3)
EOSINOPHIL # BLD AUTO: 0 THOUSAND/ΜL (ref 0–0.61)
EOSINOPHIL NFR BLD AUTO: 0 % (ref 0–6)
ERYTHROCYTE [DISTWIDTH] IN BLOOD BY AUTOMATED COUNT: 13.9 % (ref 11.6–15.1)
EST. AVERAGE GLUCOSE BLD GHB EST-MCNC: 105 MG/DL
GFR SERPL CREATININE-BSD FRML MDRD: 84 ML/MIN/1.73SQ M
GLUCOSE SERPL-MCNC: 116 MG/DL (ref 65–140)
HBA1C MFR BLD: 5.3 %
HCT VFR BLD AUTO: 43.8 % (ref 34.8–46.1)
HGB BLD-MCNC: 14.6 G/DL (ref 11.5–15.4)
IMM GRANULOCYTES # BLD AUTO: 0.03 THOUSAND/UL (ref 0–0.2)
IMM GRANULOCYTES NFR BLD AUTO: 0 % (ref 0–2)
LYMPHOCYTES # BLD AUTO: 0.87 THOUSANDS/ΜL (ref 0.6–4.47)
LYMPHOCYTES NFR BLD AUTO: 9 % (ref 14–44)
MCH RBC QN AUTO: 30.4 PG (ref 26.8–34.3)
MCHC RBC AUTO-ENTMCNC: 33.3 G/DL (ref 31.4–37.4)
MCV RBC AUTO: 91 FL (ref 82–98)
MONOCYTES # BLD AUTO: 0.35 THOUSAND/ΜL (ref 0.17–1.22)
MONOCYTES NFR BLD AUTO: 4 % (ref 4–12)
NEUTROPHILS # BLD AUTO: 7.95 THOUSANDS/ΜL (ref 1.85–7.62)
NEUTS SEG NFR BLD AUTO: 87 % (ref 43–75)
NRBC BLD AUTO-RTO: 0 /100 WBCS
PLATELET # BLD AUTO: 476 THOUSANDS/UL (ref 149–390)
PMV BLD AUTO: 9.2 FL (ref 8.9–12.7)
POTASSIUM SERPL-SCNC: 4.1 MMOL/L (ref 3.5–5.3)
PROT SERPL-MCNC: 8.2 G/DL (ref 6.4–8.2)
RBC # BLD AUTO: 4.8 MILLION/UL (ref 3.81–5.12)
SODIUM SERPL-SCNC: 138 MMOL/L (ref 136–145)
T3FREE SERPL-MCNC: 2.18 PG/ML (ref 2.3–4.2)
T4 FREE SERPL-MCNC: 0.94 NG/DL (ref 0.76–1.46)
TSH SERPL DL<=0.05 MIU/L-ACNC: 0.59 UIU/ML (ref 0.36–3.74)
WBC # BLD AUTO: 9.24 THOUSAND/UL (ref 4.31–10.16)

## 2022-03-04 PROCEDURE — 82306 VITAMIN D 25 HYDROXY: CPT

## 2022-03-04 PROCEDURE — 84439 ASSAY OF FREE THYROXINE: CPT

## 2022-03-04 PROCEDURE — 86705 HEP B CORE ANTIBODY IGM: CPT

## 2022-03-04 PROCEDURE — 83036 HEMOGLOBIN GLYCOSYLATED A1C: CPT

## 2022-03-04 PROCEDURE — 84443 ASSAY THYROID STIM HORMONE: CPT

## 2022-03-04 PROCEDURE — 84432 ASSAY OF THYROGLOBULIN: CPT

## 2022-03-04 PROCEDURE — 83520 IMMUNOASSAY QUANT NOS NONAB: CPT

## 2022-03-04 PROCEDURE — 86706 HEP B SURFACE ANTIBODY: CPT

## 2022-03-04 PROCEDURE — 83625 ASSAY OF LDH ENZYMES: CPT

## 2022-03-04 PROCEDURE — 96361 HYDRATE IV INFUSION ADD-ON: CPT

## 2022-03-04 PROCEDURE — 85025 COMPLETE CBC W/AUTO DIFF WBC: CPT

## 2022-03-04 PROCEDURE — 86800 THYROGLOBULIN ANTIBODY: CPT

## 2022-03-04 PROCEDURE — 80053 COMPREHEN METABOLIC PANEL: CPT

## 2022-03-04 PROCEDURE — 84481 FREE ASSAY (FT-3): CPT

## 2022-03-04 PROCEDURE — 96360 HYDRATION IV INFUSION INIT: CPT

## 2022-03-04 PROCEDURE — 83525 ASSAY OF INSULIN: CPT

## 2022-03-04 PROCEDURE — 83615 LACTATE (LD) (LDH) ENZYME: CPT

## 2022-03-04 PROCEDURE — 36415 COLL VENOUS BLD VENIPUNCTURE: CPT

## 2022-03-04 PROCEDURE — 87340 HEPATITIS B SURFACE AG IA: CPT

## 2022-03-04 RX ORDER — SODIUM CHLORIDE, SODIUM LACTATE, POTASSIUM CHLORIDE, CALCIUM CHLORIDE 600; 310; 30; 20 MG/100ML; MG/100ML; MG/100ML; MG/100ML
1500 INJECTION, SOLUTION INTRAVENOUS ONCE
Status: CANCELLED
Start: 2022-03-08 | End: 2022-03-06

## 2022-03-04 RX ORDER — SODIUM CHLORIDE, SODIUM LACTATE, POTASSIUM CHLORIDE, CALCIUM CHLORIDE 600; 310; 30; 20 MG/100ML; MG/100ML; MG/100ML; MG/100ML
1500 INJECTION, SOLUTION INTRAVENOUS ONCE
Status: COMPLETED | OUTPATIENT
Start: 2022-03-04 | End: 2022-03-04

## 2022-03-04 RX ORDER — METHYLPREDNISOLONE SODIUM SUCCINATE 125 MG/2ML
60 INJECTION, POWDER, LYOPHILIZED, FOR SOLUTION INTRAMUSCULAR; INTRAVENOUS ONCE
Status: CANCELLED
Start: 2022-03-08 | End: 2022-03-06

## 2022-03-04 RX ADMIN — SODIUM CHLORIDE, SODIUM LACTATE, POTASSIUM CHLORIDE, AND CALCIUM CHLORIDE 1500 ML: .6; .31; .03; .02 INJECTION, SOLUTION INTRAVENOUS at 11:36

## 2022-03-04 NOTE — PROGRESS NOTES
Patient arrived to unit without complaint  Patient tolerated 1 5 L of LR over 3 hours without incident  AVS declined and patient aware of next appointment  Patient left in stable condition

## 2022-03-05 ENCOUNTER — APPOINTMENT (OUTPATIENT)
Dept: LAB | Facility: HOSPITAL | Age: 47
End: 2022-03-05
Attending: INTERNAL MEDICINE
Payer: COMMERCIAL

## 2022-03-05 DIAGNOSIS — D84.9 IMMUNOSUPPRESSED STATUS (HCC): ICD-10-CM

## 2022-03-05 DIAGNOSIS — R19.7 DIARRHEA, UNSPECIFIED TYPE: ICD-10-CM

## 2022-03-05 DIAGNOSIS — K52.9 INFLAMMATORY BOWEL DISEASE: ICD-10-CM

## 2022-03-05 DIAGNOSIS — K50.119 CROHN'S DISEASE OF COLON WITH COMPLICATION (HCC): ICD-10-CM

## 2022-03-05 LAB
25(OH)D3 SERPL-MCNC: 47.8 NG/ML (ref 30–100)
HBV CORE IGM SER QL: NORMAL
HBV SURFACE AB SER-ACNC: 417.77 MIU/ML
HBV SURFACE AG SER QL: NORMAL
INSULIN SERPL-ACNC: 39.9 MU/L (ref 3–25)

## 2022-03-05 PROCEDURE — 83993 ASSAY FOR CALPROTECTIN FECAL: CPT

## 2022-03-05 PROCEDURE — 82705 FATS/LIPIDS FECES QUAL: CPT

## 2022-03-05 PROCEDURE — 87209 SMEAR COMPLEX STAIN: CPT

## 2022-03-05 PROCEDURE — 87177 OVA AND PARASITES SMEARS: CPT

## 2022-03-05 PROCEDURE — 82653 EL-1 FECAL QUANTITATIVE: CPT

## 2022-03-05 PROCEDURE — 87505 NFCT AGENT DETECTION GI: CPT

## 2022-03-05 PROCEDURE — 87425 ROTAVIRUS AG IA: CPT

## 2022-03-07 LAB
LDH SERPL-CCNC: 174 IU/L (ref 119–226)
LDH1 CFR SERPL ELPH: 25 % (ref 17–32)
LDH2 CFR SERPL ELPH: 36 % (ref 25–40)
LDH3 CFR SERPL ELPH: 23 % (ref 17–27)
LDH4 CFR SERPL ELPH: 9 % (ref 5–13)
LDH5 CFR SERPL ELPH: 7 % (ref 4–20)
RV AG STL QL: NEGATIVE
TRYPTASE SERPL-MCNC: 9.4 UG/L (ref 2.2–13.2)

## 2022-03-08 ENCOUNTER — HOSPITAL ENCOUNTER (OUTPATIENT)
Dept: INFUSION CENTER | Facility: CLINIC | Age: 47
Discharge: HOME | End: 2022-03-08
Payer: COMMERCIAL

## 2022-03-08 VITALS
DIASTOLIC BLOOD PRESSURE: 93 MMHG | TEMPERATURE: 97.3 F | SYSTOLIC BLOOD PRESSURE: 129 MMHG | RESPIRATION RATE: 18 BRPM | HEART RATE: 103 BPM

## 2022-03-08 DIAGNOSIS — Z13.220 LIPID SCREENING: Primary | ICD-10-CM

## 2022-03-08 DIAGNOSIS — E44.1 MILD PROTEIN-CALORIE MALNUTRITION (HCC): ICD-10-CM

## 2022-03-08 DIAGNOSIS — R19.7 DIARRHEA, UNSPECIFIED TYPE: ICD-10-CM

## 2022-03-08 DIAGNOSIS — R63.0 ANOREXIA: Primary | ICD-10-CM

## 2022-03-08 PROCEDURE — 96365 THER/PROPH/DIAG IV INF INIT: CPT

## 2022-03-08 PROCEDURE — 96375 TX/PRO/DX INJ NEW DRUG ADDON: CPT

## 2022-03-08 PROCEDURE — 96361 HYDRATE IV INFUSION ADD-ON: CPT

## 2022-03-08 RX ORDER — ATORVASTATIN CALCIUM 40 MG/1
40 TABLET, FILM COATED ORAL DAILY
Qty: 30 TABLET | Refills: 5 | Status: CANCELLED | OUTPATIENT
Start: 2022-03-08

## 2022-03-08 RX ORDER — METHYLPREDNISOLONE SODIUM SUCCINATE 125 MG/2ML
60 INJECTION, POWDER, LYOPHILIZED, FOR SOLUTION INTRAMUSCULAR; INTRAVENOUS ONCE
Status: CANCELLED
Start: 2022-03-10 | End: 2022-03-10

## 2022-03-08 RX ORDER — SODIUM CHLORIDE, SODIUM LACTATE, POTASSIUM CHLORIDE, CALCIUM CHLORIDE 600; 310; 30; 20 MG/100ML; MG/100ML; MG/100ML; MG/100ML
1500 INJECTION, SOLUTION INTRAVENOUS ONCE
Status: CANCELLED
Start: 2022-03-11 | End: 2022-03-10

## 2022-03-08 RX ORDER — METHYLPREDNISOLONE SODIUM SUCCINATE 125 MG/2ML
60 INJECTION, POWDER, LYOPHILIZED, FOR SOLUTION INTRAMUSCULAR; INTRAVENOUS ONCE
Status: COMPLETED | OUTPATIENT
Start: 2022-03-08 | End: 2022-03-08

## 2022-03-08 RX ORDER — SODIUM CHLORIDE, SODIUM LACTATE, POTASSIUM CHLORIDE, CALCIUM CHLORIDE 600; 310; 30; 20 MG/100ML; MG/100ML; MG/100ML; MG/100ML
1500 INJECTION, SOLUTION INTRAVENOUS ONCE
Status: COMPLETED | OUTPATIENT
Start: 2022-03-08 | End: 2022-03-08

## 2022-03-08 RX ADMIN — METHYLPREDNISOLONE SODIUM SUCCINATE 60 MG: 125 INJECTION, POWDER, FOR SOLUTION INTRAMUSCULAR; INTRAVENOUS at 12:54

## 2022-03-08 RX ADMIN — DIPHENHYDRAMINE HYDROCHLORIDE 50 MG: 50 INJECTION, SOLUTION INTRAMUSCULAR; INTRAVENOUS at 12:54

## 2022-03-08 RX ADMIN — SODIUM CHLORIDE, SODIUM LACTATE, POTASSIUM CHLORIDE, AND CALCIUM CHLORIDE 1500 ML: .6; .31; .03; .02 INJECTION, SOLUTION INTRAVENOUS at 12:55

## 2022-03-08 NOTE — PROGRESS NOTES
[facility]  Cholo Mendoza, [place]    Recommendation: Consider initiating high intensity statin such as atorvastatin 40mg daily  Reason for Documentation: Patient has been identified as having a history of clinical ASCVD and not currently being on statin therapy  The 2018 AHA/ACC Cholesterol Guidelines recommend: In patients with clinical ASCVD, reduce low-density lipoprotein cholesterol (LDL-C) with high-intensity statin therapy or maximally tolerated statin therapy  The ASCVD Risk score (Abhijit Albert et al , 2013) failed to calculate for the following reasons: The patient has a prior MI or stroke diagnosis    Previous statin trial: none    Lab Results   Component Value Date    CHOLESTEROL 200 10/25/2019    CHOLESTEROL 133 02/13/2019    CHOLESTEROL 149 12/06/2017     Lab Results   Component Value Date    HDL 75 10/25/2019    HDL 54 02/13/2019    HDL 61 (H) 12/06/2017     Lab Results   Component Value Date    TRIG 52 10/25/2019    TRIG 84 02/13/2019    TRIG 45 12/06/2017     No results found for: [place]  Lab Results   Component Value Date    LDLCALC 115 (H) 10/25/2019    LDLCALC 62 02/13/2019    LDLCALC 79 12/06/2017     No results found for: LDLDIRECT    Lab Results   Component Value Date    ALT 20 03/04/2022    AST 17 03/04/2022    ALKPHOS 64 03/04/2022    BILITOT 0 7 01/16/2015     PCP: Please sign pended medication order if recommendation is accepted or remove/ cancel medication order if you do not agree (do not select "deny")  If accepted clinical pharmacist to provide education to patient pending your decision      Reason For [facility] Pharmacist    Demographics  Interaction Method: Chart Review (EMR)  Type of Intervention: New    Topic(s) Addressed  Statin Use (SPC)    Intervention(s) Made    Pharmacologic:    -- Medication Initiation: atorvastatin    Non-Pharmacologic:    -- Preventive care gap closure recommendation    Tool(s) Used  Payer Portal    Time Spent:     Time Spent in Care Coordination: 35 minutes    Recommendations  Recipient: Provider  Outcome: Pending/Follow-up Required

## 2022-03-09 LAB — CALPROTECTIN STL-MCNT: 78 UG/G (ref 0–120)

## 2022-03-09 NOTE — TELEPHONE ENCOUNTER
I need to gather more information and lab work on this patient before prescribing statin  It looks like she has not had a lipid panel performed in a while  What indications does she have for statin therapy that I am not seeing?

## 2022-03-10 LAB — ELASTASE PANC STL-MCNT: >500 UG ELAST./G

## 2022-03-10 NOTE — TELEPHONE ENCOUNTER
Based on my calculations, her ASCVD risk is <1%  I will reach out to her to have another lipid panel drawn but I recommend we hold off on adding statin at this time      Dr Rickie Morel

## 2022-03-11 ENCOUNTER — HOSPITAL ENCOUNTER (OUTPATIENT)
Dept: INFUSION CENTER | Facility: CLINIC | Age: 47
Discharge: HOME | End: 2022-03-11
Payer: COMMERCIAL

## 2022-03-11 VITALS
RESPIRATION RATE: 18 BRPM | HEART RATE: 97 BPM | DIASTOLIC BLOOD PRESSURE: 86 MMHG | SYSTOLIC BLOOD PRESSURE: 136 MMHG | TEMPERATURE: 97.7 F

## 2022-03-11 DIAGNOSIS — R63.0 ANOREXIA: Primary | ICD-10-CM

## 2022-03-11 DIAGNOSIS — R19.7 DIARRHEA, UNSPECIFIED TYPE: ICD-10-CM

## 2022-03-11 DIAGNOSIS — E44.1 MILD PROTEIN-CALORIE MALNUTRITION (HCC): ICD-10-CM

## 2022-03-11 LAB
FAT STL QL: NORMAL
NEUTRAL FAT STL QL: NORMAL

## 2022-03-11 PROCEDURE — 96361 HYDRATE IV INFUSION ADD-ON: CPT

## 2022-03-11 PROCEDURE — 96360 HYDRATION IV INFUSION INIT: CPT

## 2022-03-11 RX ORDER — METHYLPREDNISOLONE SODIUM SUCCINATE 125 MG/2ML
60 INJECTION, POWDER, LYOPHILIZED, FOR SOLUTION INTRAMUSCULAR; INTRAVENOUS ONCE
Status: CANCELLED
Start: 2022-03-14 | End: 2022-03-14

## 2022-03-11 RX ORDER — SODIUM CHLORIDE, SODIUM LACTATE, POTASSIUM CHLORIDE, CALCIUM CHLORIDE 600; 310; 30; 20 MG/100ML; MG/100ML; MG/100ML; MG/100ML
1500 INJECTION, SOLUTION INTRAVENOUS ONCE
Status: COMPLETED | OUTPATIENT
Start: 2022-03-11 | End: 2022-03-11

## 2022-03-11 RX ORDER — SODIUM CHLORIDE, SODIUM LACTATE, POTASSIUM CHLORIDE, CALCIUM CHLORIDE 600; 310; 30; 20 MG/100ML; MG/100ML; MG/100ML; MG/100ML
1500 INJECTION, SOLUTION INTRAVENOUS ONCE
Status: CANCELLED
Start: 2022-03-14 | End: 2022-03-14

## 2022-03-11 RX ADMIN — SODIUM CHLORIDE, SODIUM LACTATE, POTASSIUM CHLORIDE, AND CALCIUM CHLORIDE 1500 ML: .6; .31; .03; .02 INJECTION, SOLUTION INTRAVENOUS at 11:52

## 2022-03-11 NOTE — TELEPHONE ENCOUNTER
With unclear history and no evidence of CVA on imaging or clear diagnosis, I would recommend that I have a discussion with the patient before putting her on a high-intensity statin  Thank you

## 2022-03-11 NOTE — PROGRESS NOTES
Patient tolerated hydration infusion without incident  Offers no complaints  Pt is aware of all future appointments   Declined AVS

## 2022-03-12 ENCOUNTER — APPOINTMENT (OUTPATIENT)
Dept: LAB | Facility: HOSPITAL | Age: 47
End: 2022-03-12
Payer: COMMERCIAL

## 2022-03-12 ENCOUNTER — APPOINTMENT (OUTPATIENT)
Dept: LAB | Facility: HOSPITAL | Age: 47
End: 2022-03-12
Attending: FAMILY MEDICINE
Payer: COMMERCIAL

## 2022-03-12 DIAGNOSIS — R73.9 BLOOD GLUCOSE ELEVATED: ICD-10-CM

## 2022-03-12 DIAGNOSIS — Z13.220 LIPID SCREENING: ICD-10-CM

## 2022-03-12 DIAGNOSIS — E03.2 HYPOTHYROIDISM DUE TO DRUGS: ICD-10-CM

## 2022-03-12 DIAGNOSIS — L50.9 URTICARIA, UNSPECIFIED: ICD-10-CM

## 2022-03-12 DIAGNOSIS — R19.7 DIARRHEA, UNSPECIFIED TYPE: ICD-10-CM

## 2022-03-12 LAB
ALBUMIN SERPL BCP-MCNC: 3.4 G/DL (ref 3.5–5)
ALP SERPL-CCNC: 62 U/L (ref 46–116)
ALT SERPL W P-5'-P-CCNC: 20 U/L (ref 12–78)
ANION GAP SERPL CALCULATED.3IONS-SCNC: 10 MMOL/L (ref 4–13)
AST SERPL W P-5'-P-CCNC: 16 U/L (ref 5–45)
BASOPHILS # BLD AUTO: 0.12 THOUSANDS/ΜL (ref 0–0.1)
BASOPHILS NFR BLD AUTO: 1 % (ref 0–1)
BILIRUB SERPL-MCNC: 0.27 MG/DL (ref 0.2–1)
BUN SERPL-MCNC: 14 MG/DL (ref 5–25)
CALCIUM ALBUM COR SERPL-MCNC: 9.2 MG/DL (ref 8.3–10.1)
CALCIUM SERPL-MCNC: 8.7 MG/DL (ref 8.3–10.1)
CHLORIDE SERPL-SCNC: 103 MMOL/L (ref 100–108)
CHOLEST SERPL-MCNC: 196 MG/DL
CO2 SERPL-SCNC: 26 MMOL/L (ref 21–32)
CREAT SERPL-MCNC: 0.75 MG/DL (ref 0.6–1.3)
EOSINOPHIL # BLD AUTO: 0.09 THOUSAND/ΜL (ref 0–0.61)
EOSINOPHIL NFR BLD AUTO: 1 % (ref 0–6)
ERYTHROCYTE [DISTWIDTH] IN BLOOD BY AUTOMATED COUNT: 13.7 % (ref 11.6–15.1)
EST. AVERAGE GLUCOSE BLD GHB EST-MCNC: 100 MG/DL
GFR SERPL CREATININE-BSD FRML MDRD: 93 ML/MIN/1.73SQ M
GLUCOSE P FAST SERPL-MCNC: 81 MG/DL (ref 65–99)
HBA1C MFR BLD: 5.1 %
HCT VFR BLD AUTO: 39.7 % (ref 34.8–46.1)
HDLC SERPL-MCNC: 77 MG/DL
HGB BLD-MCNC: 13.5 G/DL (ref 11.5–15.4)
IMM GRANULOCYTES # BLD AUTO: 0.05 THOUSAND/UL (ref 0–0.2)
IMM GRANULOCYTES NFR BLD AUTO: 0 % (ref 0–2)
INSULIN SERPL-ACNC: 9.9 MU/L (ref 3–25)
LDLC SERPL CALC-MCNC: 105 MG/DL (ref 0–100)
LYMPHOCYTES # BLD AUTO: 2.56 THOUSANDS/ΜL (ref 0.6–4.47)
LYMPHOCYTES NFR BLD AUTO: 18 % (ref 14–44)
MCH RBC QN AUTO: 29.7 PG (ref 26.8–34.3)
MCHC RBC AUTO-ENTMCNC: 34 G/DL (ref 31.4–37.4)
MCV RBC AUTO: 87 FL (ref 82–98)
MONOCYTES # BLD AUTO: 1.41 THOUSAND/ΜL (ref 0.17–1.22)
MONOCYTES NFR BLD AUTO: 10 % (ref 4–12)
NEUTROPHILS # BLD AUTO: 9.66 THOUSANDS/ΜL (ref 1.85–7.62)
NEUTS SEG NFR BLD AUTO: 70 % (ref 43–75)
NRBC BLD AUTO-RTO: 0 /100 WBCS
PLATELET # BLD AUTO: 468 THOUSANDS/UL (ref 149–390)
PMV BLD AUTO: 9.2 FL (ref 8.9–12.7)
POTASSIUM SERPL-SCNC: 3.7 MMOL/L (ref 3.5–5.3)
PROT SERPL-MCNC: 7.2 G/DL (ref 6.4–8.2)
RBC # BLD AUTO: 4.54 MILLION/UL (ref 3.81–5.12)
SODIUM SERPL-SCNC: 139 MMOL/L (ref 136–145)
TRIGL SERPL-MCNC: 69 MG/DL
WBC # BLD AUTO: 13.89 THOUSAND/UL (ref 4.31–10.16)

## 2022-03-12 PROCEDURE — 83520 IMMUNOASSAY QUANT NOS NONAB: CPT

## 2022-03-12 PROCEDURE — 80053 COMPREHEN METABOLIC PANEL: CPT

## 2022-03-12 PROCEDURE — 80061 LIPID PANEL: CPT

## 2022-03-12 PROCEDURE — 36415 COLL VENOUS BLD VENIPUNCTURE: CPT

## 2022-03-12 PROCEDURE — 83525 ASSAY OF INSULIN: CPT

## 2022-03-12 PROCEDURE — 83036 HEMOGLOBIN GLYCOSYLATED A1C: CPT

## 2022-03-12 PROCEDURE — 85025 COMPLETE CBC W/AUTO DIFF WBC: CPT

## 2022-03-14 ENCOUNTER — HOSPITAL ENCOUNTER (OUTPATIENT)
Dept: INFUSION CENTER | Facility: CLINIC | Age: 47
Discharge: HOME | End: 2022-03-14
Payer: COMMERCIAL

## 2022-03-14 VITALS
HEART RATE: 109 BPM | TEMPERATURE: 98.3 F | SYSTOLIC BLOOD PRESSURE: 111 MMHG | RESPIRATION RATE: 18 BRPM | DIASTOLIC BLOOD PRESSURE: 78 MMHG

## 2022-03-14 DIAGNOSIS — R63.0 ANOREXIA: Primary | ICD-10-CM

## 2022-03-14 DIAGNOSIS — E44.1 MILD PROTEIN-CALORIE MALNUTRITION (HCC): ICD-10-CM

## 2022-03-14 DIAGNOSIS — R19.7 DIARRHEA, UNSPECIFIED TYPE: ICD-10-CM

## 2022-03-14 LAB — TRYPTASE SERPL-MCNC: 10.3 UG/L (ref 2.2–13.2)

## 2022-03-14 PROCEDURE — 96365 THER/PROPH/DIAG IV INF INIT: CPT

## 2022-03-14 PROCEDURE — 96375 TX/PRO/DX INJ NEW DRUG ADDON: CPT

## 2022-03-14 PROCEDURE — 96361 HYDRATE IV INFUSION ADD-ON: CPT

## 2022-03-14 RX ORDER — METHYLPREDNISOLONE SODIUM SUCCINATE 125 MG/2ML
60 INJECTION, POWDER, LYOPHILIZED, FOR SOLUTION INTRAMUSCULAR; INTRAVENOUS ONCE
Status: CANCELLED
Start: 2022-03-22 | End: 2022-03-17

## 2022-03-14 RX ORDER — SODIUM CHLORIDE, SODIUM LACTATE, POTASSIUM CHLORIDE, CALCIUM CHLORIDE 600; 310; 30; 20 MG/100ML; MG/100ML; MG/100ML; MG/100ML
1500 INJECTION, SOLUTION INTRAVENOUS ONCE
Status: CANCELLED
Start: 2022-03-17 | End: 2022-03-17

## 2022-03-14 RX ORDER — METHYLPREDNISOLONE SODIUM SUCCINATE 125 MG/2ML
60 INJECTION, POWDER, LYOPHILIZED, FOR SOLUTION INTRAMUSCULAR; INTRAVENOUS ONCE
Status: COMPLETED | OUTPATIENT
Start: 2022-03-14 | End: 2022-03-14

## 2022-03-14 RX ORDER — SODIUM CHLORIDE, SODIUM LACTATE, POTASSIUM CHLORIDE, CALCIUM CHLORIDE 600; 310; 30; 20 MG/100ML; MG/100ML; MG/100ML; MG/100ML
1500 INJECTION, SOLUTION INTRAVENOUS ONCE
Status: COMPLETED | OUTPATIENT
Start: 2022-03-14 | End: 2022-03-14

## 2022-03-14 RX ADMIN — DIPHENHYDRAMINE HYDROCHLORIDE 50 MG: 50 INJECTION, SOLUTION INTRAMUSCULAR; INTRAVENOUS at 12:06

## 2022-03-14 RX ADMIN — METHYLPREDNISOLONE SODIUM SUCCINATE 60 MG: 125 INJECTION, POWDER, FOR SOLUTION INTRAMUSCULAR; INTRAVENOUS at 12:03

## 2022-03-14 RX ADMIN — SODIUM CHLORIDE, SODIUM LACTATE, POTASSIUM CHLORIDE, AND CALCIUM CHLORIDE 1500 ML: .6; .31; .03; .02 INJECTION, SOLUTION INTRAVENOUS at 12:02

## 2022-03-15 DIAGNOSIS — K52.9 INFLAMMATORY BOWEL DISEASE: Primary | ICD-10-CM

## 2022-03-15 DIAGNOSIS — D72.829 LEUKOCYTOSIS, UNSPECIFIED TYPE: Primary | ICD-10-CM

## 2022-03-15 DIAGNOSIS — D72.829 LEUKOCYTOSIS, UNSPECIFIED TYPE: ICD-10-CM

## 2022-03-15 LAB
THYROGLOB AB SERPL-ACNC: 11.8 IU/ML (ref 0–0.9)
THYROGLOB AB SERPL-ACNC: 11.9 IU/ML (ref 0–0.9)
THYROGLOB SERPL-MCNC: 3.2 NG/ML
THYROGLOB SERPL-MCNC: 5.7 NG/ML

## 2022-03-17 ENCOUNTER — HOSPITAL ENCOUNTER (OUTPATIENT)
Dept: INFUSION CENTER | Facility: CLINIC | Age: 47
End: 2022-03-17

## 2022-03-18 ENCOUNTER — HOSPITAL ENCOUNTER (OUTPATIENT)
Dept: INFUSION CENTER | Facility: CLINIC | Age: 47
Discharge: HOME | End: 2022-03-18

## 2022-03-21 ENCOUNTER — OFFICE VISIT (OUTPATIENT)
Dept: FAMILY MEDICINE CLINIC | Facility: CLINIC | Age: 47
End: 2022-03-21
Payer: COMMERCIAL

## 2022-03-21 VITALS
OXYGEN SATURATION: 98 % | TEMPERATURE: 97.6 F | HEIGHT: 65 IN | SYSTOLIC BLOOD PRESSURE: 114 MMHG | HEART RATE: 86 BPM | DIASTOLIC BLOOD PRESSURE: 78 MMHG | BODY MASS INDEX: 25.96 KG/M2 | WEIGHT: 155.8 LBS | RESPIRATION RATE: 20 BRPM

## 2022-03-21 DIAGNOSIS — Z86.16 HISTORY OF COVID-19: ICD-10-CM

## 2022-03-21 DIAGNOSIS — D89.40 MAST CELL ACTIVATION SYNDROME (HCC): Primary | ICD-10-CM

## 2022-03-21 DIAGNOSIS — G43.809 OTHER MIGRAINE WITHOUT STATUS MIGRAINOSUS, NOT INTRACTABLE: ICD-10-CM

## 2022-03-21 DIAGNOSIS — Z12.31 BREAST CANCER SCREENING BY MAMMOGRAM: ICD-10-CM

## 2022-03-21 DIAGNOSIS — R73.9 HYPERGLYCEMIA, UNSPECIFIED: ICD-10-CM

## 2022-03-21 DIAGNOSIS — E03.9 HYPOTHYROIDISM (ACQUIRED): ICD-10-CM

## 2022-03-21 DIAGNOSIS — D89.44 HEREDITARY ALPHA TRYPTASEMIA (HCC): ICD-10-CM

## 2022-03-21 LAB — GLUCOSE SERPL-MCNC: 83 MG/DL (ref 65–140)

## 2022-03-21 PROCEDURE — 3008F BODY MASS INDEX DOCD: CPT | Performed by: INTERNAL MEDICINE

## 2022-03-21 PROCEDURE — 99214 OFFICE O/P EST MOD 30 MIN: CPT | Performed by: FAMILY MEDICINE

## 2022-03-21 PROCEDURE — 82948 REAGENT STRIP/BLOOD GLUCOSE: CPT | Performed by: FAMILY MEDICINE

## 2022-03-21 RX ORDER — LEVOTHYROXINE SODIUM 75 MCG
TABLET ORAL
COMMUNITY
Start: 2022-03-16

## 2022-03-21 RX ORDER — BUTALBITAL, ACETAMINOPHEN AND CAFFEINE 50; 325; 40 MG/1; MG/1; MG/1
1 TABLET ORAL EVERY 8 HOURS PRN
Qty: 20 TABLET | Refills: 0 | Status: SHIPPED | OUTPATIENT
Start: 2022-03-21 | End: 2022-07-20 | Stop reason: SDUPTHER

## 2022-03-21 NOTE — PROGRESS NOTES
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HCC)    History of COVID-19  -     SARS-CoV2 Antibody, Total (IgG, IgA, IgM) [facility]; Future    Hereditary alpha tryptasemia    Other migraine without status migrainosus, not intractable  -     butalbital-acetaminophen-caffeine (FIORICET,ESGIC) -40 mg per tablet; Take 1 tablet by mouth every 8 (eight) hours as needed for migraine    Breast cancer screening by mammogram  -     Mammo screening bilateral w 3d & cad; Future    Hypothyroidism (acquired)  -     Synthroid 75 MCG tablet    Hyperglycemia, unspecified  -     Fingerstick Glucose (POCT)      - patient is clinically stable at this time  I recommend she continue follow-up with her specialist in [place] in regards to her MCA S and her hereditary alpha-tryptasemia  - As for her migraines, I did refill her Fioricet as this seems to be working  I did caution her on the persistent use of this medication for migraine headaches  I encouraged her to schedule a follow-up appointment with me to discuss her migraines specifically and to formulate an ongoing plan forward and then does not include a controlled substance for her migraines  She agreed with this plan  PDMP was reviewed and showed no concerns  -POCT glucose performed today and was 83  No changes in regards to treatment at this time  A1c has been normal    - I reviewed her recent lipid panel results  Current 10-year ASCVD risk <1%  Questionable history of TIA but no infarcts documented on imaging or in previous notes  History seems more consistent with a facial nerve palsy that has now resolved  After discussing with patient, decision was made to hold off on initiating statin therapy at this time    -screening mammogram ordered today as well   -I encouraged patient to schedule an appointment for Pap test with her OBGYN provider  - patient in agreement with plan  -RTO in 3 months      Subjective     Patient Id: Jozef Cantu is a 50 y o  female    HPI    Patient here today for follow-up  She has a hx of  MCAS and alpha-tryptasemia  She is seeing specialist at [facility] in [place]  She has questions today regarding whether or not she should receive her COVID-19 booster vaccination  She did have COVID earlier in the year and received monoclonal antibody  It has been 90 days since her treatment  She is also asking if we can check her COVID antibody level at this time  Patient also would like to have her fasting glucose checked today  Patient also suffers from migraine headaches  She gets these intermittently and has been treating them successfully with as needed Fioricet  These seem to occur her around her time of infusion therapy  She has used Elavil and sumatriptan in the past but these both caused anaphylactic reactions  She has not tried any other preventative medication for migraine that I am aware of  She reports being happy with the results of the Fioricet  Patient on is also do a screening mammogram test   She receives her gyn care from her OBGYN provider  No other complaints or concerns today  Review of Systems   Constitutional: Negative for chills and fever  Respiratory: Negative for shortness of breath  Cardiovascular: Negative for chest pain  Gastrointestinal: Negative for abdominal pain, constipation, diarrhea, nausea and vomiting  Genitourinary: Negative for dysuria  Musculoskeletal: Negative for arthralgias and myalgias  Skin: Negative for rash  Neurological: Negative for headaches  Psychiatric/Behavioral: Negative for dysphoric mood  All other systems reviewed and are negative        Past Medical History:   Diagnosis Date    Anxiety     Asthma     Chronic kidney disease     Deep vein thrombosis (HCC)     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    Generalized anxiety disorder 8/26/2017    Heart murmur     Mast cell disease     Migraine     Myocardial infarction [facility])     NSTEMI  pt denies, documented in cardio note    Pancreatitis     sphinger of     Psychiatric disorder     RA [facility]rheumatoid arthritis) ([facility] [address] [facility]     Ulcerative colitis ([facility] [address] )        Past Surgical History:   Procedure Laterality Date    APPENDECTOMY      CHOLECYSTECTOMY      EGD AND COLONOSCOPY N/A 9/12/2017    Procedure: EGD AND COLONOSCOPY;  Surgeon: Magda Menezes MD;  [facility]; Service: Gastroenterology    ERCP N/A 9/15/2017    Procedure: ENDOSCOPIC RETROGRADE CHOLANGIOPANCREATOGRAPHY (ERCP); Surgeon: James Vo MD;  [facility];   Service: Gastroenterology    HYSTERECTOMY      KNEE SURGERY Right     LAPAROSCOPIC ENDOMETRIOSIS FULGURATION      ORIF ANKLE FRACTURE Left     KS KNEE SCOPE,MED/LAT MENISECTOMY Left 5/12/2017    Procedure: POSSIBLE MEDIAL MENISECTOMY;  Surgeon: Kendy Martin MD;  [facility];  Service: Orthopedics    KS KNEE [address] CART Left 5/12/2017    Procedure: KNEE ARTHROSCOPY EVALUATION, CHONDROPLASTY;  Surgeon: Kendy Martin MD;  [facility];  Service: Orthopedics    KS LAP,DIAGNOSTIC ABDOMEN N/A 12/12/2017    Procedure: LAPAROSCOPY DIAGNOSTIC  WITH LYSIS OF ADHESIONS;  Surgeon: Kin Jose DO;  [facility];  Service: General       Family History   Problem Relation Age of Onset    Ulcerative colitis Mother     Heart failure Father     Neuropathy Father     Macular degeneration Father     Diabetes Father     Asthma Daughter     Hypothyroidism Daughter     Heart disease Maternal Grandmother     Arthritis Maternal Grandmother     Arthritis Paternal Grandmother     Macular degeneration Paternal Grandmother     Lymphoma Paternal Grandfather     Heart failure Paternal Aunt     Heart failure Paternal [de-identified]     Breast cancer Paternal Aunt 53    Breast cancer additional onset Paternal Aunt 62    Hypothyroidism Paternal Aunt     Breast cancer Maternal Aunt     No Known Problems Maternal Grandfather        Allergies   Allergen Reactions    Amitriptyline Anaphylaxis     According to the patient she had nphylaxis    Aspergillus Fumigatus Other (See Comments), Hives and Swelling    Azathioprine Other (See Comments) and Anaphylaxis     pancreatitis  According to patient she needed Epipen    Buspar [Buspirone] Hives and Shortness Of Breath    Corn Dextrin Anaphylaxis     Any corn based products    Eggs Or Egg-Derived Products - Food Allergy Anaphylaxis    Gelatin - Food Allergy Anaphylaxis    Lactated Ringers Shortness Of Breath     Pt questions this allergy, pt has received LR multiple times without reaction    Malto Dextrin [Dextrin] Anaphylaxis    Other Anaphylaxis     Gel caps, maltodextrose, dextrose,grapes    Penicillium Notatum Shortness Of Breath and Swelling    Sumatriptan Anaphylaxis     Bradycardia, sob, back pressure, head pain,throat tightness  According to the patient she had anphylaxis    Contrast [Iodinated Diagnostic Agents] Other (See Comments)     Pt states needs to be premedicated prior to injection   [employer] - Food Allergy - Food Allergy Hives    Humira [Adalimumab] Other (See Comments)     "I develop drug induced lupus"    Ibuprofen Hives and Throat Swelling    Remicade [Infliximab] Other (See Comments)     "I develop drug induced lupus"    Sulfa Antibiotics Hives and Other (See Comments)    Sulfate Hives    Sulfites - Food Allergy Hives    Chlorhexidine Itching    Histamine Hives, Rash and Other (See Comments)     Intolerance           Current Outpatient Medications:     budesonide (ENTOCORT EC) 3 MG capsule, TAKE ONE CAPSULE BY MOUTH 3 TIMES A DAY BEFORE MEALS, Disp: 90 capsule, Rfl: 2    butalbital-acetaminophen-caffeine (FIORICET,ESGIC) -40 mg per tablet, Take 1 tablet by mouth every 8 (eight) hours as needed for migraine, Disp: 20 tablet, Rfl: 0    cetirizine (ZyrTEC) 10 mg tablet, Take 20 mg by mouth daily , Disp: , Rfl:     cyanocobalamin 1,000 mcg/mL, , Disp: , Rfl:     diphenhydrAMINE (BENADRYL) 25 mg tablet, Take 50 mg by mouth 2 (two) times a day , Disp: , Rfl:     EPINEPHrine (EPIPEN 2-MARYSOL IJ), EpiPen  prn, Disp: , Rfl:     ergocalciferol (VITAMIN D2) 50,000 units, , Disp: , Rfl:     famotidine (PEPCID) 40 MG tablet, Take 1 tablet (40 mg total) by mouth daily, Disp: 30 tablet, Rfl: 3    hydrOXYzine HCL (ATARAX) 25 mg tablet, Take 25 mg by mouth 4 (four) times a day , Disp: , Rfl:     linaGLIPtin 5 MG TABS, Take 5 mg by mouth daily, Disp: , Rfl:     liothyronine (CYTOMEL) 5 mcg tablet, Take 5 mcg by mouth daily, Disp: , Rfl:     LORazepam (ATIVAN) 1 mg tablet, Take 1 tablet (1 mg total) by mouth every 8 (eight) hours as needed for anxiety, Disp: 21 tablet, Rfl: 0    montelukast (SINGULAIR) 10 mg tablet, Take 1 tablet (10 mg total) by mouth daily at bedtime, Disp: 30 tablet, Rfl: 0    omalizumab (XOLAIR) 150 mg, Inject 300 mg under the skin every 28 days , Disp: , Rfl:     ondansetron (ZOFRAN-ODT) 4 mg disintegrating tablet, Take 1 tablet (4 mg total) by mouth every 6 (six) hours as needed for nausea or vomiting, Disp: 20 tablet, Rfl: 0    pantoprazole (PROTONIX) 40 mg tablet, Take 1 tablet (40 mg total) by mouth daily (Patient taking differently: Take 40 mg by mouth 2 (two) times a day  ), Disp: 30 tablet, Rfl: 3    predniSONE 20 mg tablet, Taper as per physician instructions (Patient taking differently: 40 mg Taper as per physician instructions ), Disp: 100 tablet, Rfl: 0    Riboflavin (Vitamin B-2) 100 MG TABS, Take 4 tablets (400 mg total) by mouth daily, Disp: 360 tablet, Rfl: 3    Stelara 90 MG/ML subcutaneous injection, 90mg inject subcutaneously every 28 days, Disp: 1 mL, Rfl: 6    Synthroid 75 MCG tablet, , Disp: , Rfl:     temazepam (RESTORIL) 30 mg capsule, Take 1 capsule (30 mg total) by mouth daily at bedtime as needed for sleep, Disp: 30 capsule, Rfl: 5    acetaminophen (TYLENOL) 500 mg tablet, Take 1,000 mg by mouth every 6 (six) hours as needed for mild pain (Patient not taking: Reported on 12/27/2021 ), Disp: , Rfl:     cholecalciferol (VITAMIN D) 400 units/mL, Take 2 5 mL (1,000 Units total) by mouth daily (Patient not taking: Reported on 12/27/2021 ), Disp: 1 Bottle, Rfl: 5    diclofenac (VOLTAREN) 75 mg EC tablet, Take 1 tablet (75 mg total) by mouth 2 (two) times a day for 21 days (Patient taking differently: Take 75 mg by mouth as needed ), Disp: 42 tablet, Rfl: 2    ergocalciferol (VITAMIN D2) 50,000 units,  Start: 02/22/21 17:37:00 EST, See Instructions, 50,000 Int_Unit PO 1 time per week (Patient not taking: Reported on 12/27/2021), Disp: , Rfl:     levalbuterol (XOPENEX HFA) 45 mcg/act inhaler, Inhale 1-2 puffs every 4 (four) hours as needed for shortness of breath (Patient not taking: Reported on 12/27/2021 ), Disp: , Rfl:     levothyroxine 50 mcg tablet, Take 50 mcg by mouth daily (Patient not taking: Reported on 3/21/2022 ), Disp: , Rfl:     NON FORMULARY, immunoglobin sq 6g 30 ml once a week (Patient not taking: Reported on 12/27/2021 ), Disp: , Rfl:     simethicone (MYLICON) 750 MG chewable tablet, Chew 125 mg every 6 (six) hours as needed for flatulence (Patient not taking: Reported on 12/3/2021 ), Disp: , Rfl:     Objective     Vitals:    03/21/22 0834   BP: 114/78   Pulse: 86   Resp: 20   Temp: 97 6 °F (36 4 °C)   TempSrc: Tympanic   SpO2: 98%   Weight: 70 7 kg (155 lb 12 8 oz)   Height: 5' 5 25" (1 657 m)       Physical Exam  Vitals and nursing note reviewed  Constitutional:       General: She is not in acute distress  Appearance: Normal appearance  She is normal weight  She is not ill-appearing or toxic-appearing  HENT:      Head: Normocephalic and atraumatic  Eyes:      Extraocular Movements: Extraocular movements intact  Cardiovascular:      Rate and Rhythm: Normal rate and regular rhythm  Pulses: Normal pulses  Heart sounds: No murmur heard        Pulmonary:      Effort: Pulmonary effort is normal  No respiratory distress  Breath sounds: Normal breath sounds  No wheezing, rhonchi or rales  Musculoskeletal:      Cervical back: Neck supple  Neurological:      General: No focal deficit present  Mental Status: She is alert and oriented to person, place, and time  Mental status is at baseline     Psychiatric:         Mood and Affect: Mood normal          Behavior: Behavior normal          Kelton Walker 26

## 2022-03-22 ENCOUNTER — APPOINTMENT (OUTPATIENT)
Dept: NON INVASIVE DIAGNOSTICS | Facility: HOSPITAL | Age: 47
End: 2022-03-22
Attending: INTERNAL MEDICINE
Payer: COMMERCIAL

## 2022-03-22 ENCOUNTER — HOSPITAL ENCOUNTER (OUTPATIENT)
Dept: NON INVASIVE DIAGNOSTICS | Facility: HOSPITAL | Age: 47
Discharge: HOME | End: 2022-03-22
Attending: INTERNAL MEDICINE
Payer: COMMERCIAL

## 2022-03-22 ENCOUNTER — HOSPITAL ENCOUNTER (OUTPATIENT)
Dept: INFUSION CENTER | Facility: CLINIC | Age: 47
Discharge: HOME | End: 2022-03-22
Payer: COMMERCIAL

## 2022-03-22 VITALS
HEIGHT: 65 IN | WEIGHT: 155 LBS | SYSTOLIC BLOOD PRESSURE: 114 MMHG | HEART RATE: 59 BPM | BODY MASS INDEX: 25.83 KG/M2 | DIASTOLIC BLOOD PRESSURE: 78 MMHG

## 2022-03-22 VITALS
SYSTOLIC BLOOD PRESSURE: 151 MMHG | DIASTOLIC BLOOD PRESSURE: 75 MMHG | TEMPERATURE: 97.2 F | HEART RATE: 108 BPM | RESPIRATION RATE: 18 BRPM

## 2022-03-22 DIAGNOSIS — E44.1 MILD PROTEIN-CALORIE MALNUTRITION (HCC): ICD-10-CM

## 2022-03-22 DIAGNOSIS — R63.0 ANOREXIA: Primary | ICD-10-CM

## 2022-03-22 DIAGNOSIS — R19.7 DIARRHEA, UNSPECIFIED TYPE: ICD-10-CM

## 2022-03-22 DIAGNOSIS — I47.1 ATRIAL TACHYCARDIA (HCC): ICD-10-CM

## 2022-03-22 DIAGNOSIS — R00.2 HEART PALPITATIONS: ICD-10-CM

## 2022-03-22 LAB
APICAL FOUR CHAMBER EJECTION FRACTION: 63 %
E WAVE DECELERATION TIME: 175 MS
MV PEAK A VEL: 0.73 M/S
MV PEAK E VEL: 53 CM/S
MV STENOSIS PRESSURE HALF TIME: 51 MS
MV VALVE AREA P 1/2 METHOD: 4.31 CM2
SL CV LV EF: 65
TR MAX PG: 17 MMHG
TR PEAK VELOCITY: 2.1 M/S
TRICUSPID VALVE PEAK REGURGITATION VELOCITY: 2.05 M/S

## 2022-03-22 PROCEDURE — 96375 TX/PRO/DX INJ NEW DRUG ADDON: CPT

## 2022-03-22 PROCEDURE — 96361 HYDRATE IV INFUSION ADD-ON: CPT

## 2022-03-22 PROCEDURE — 93308 TTE F-UP OR LMTD: CPT | Performed by: INTERNAL MEDICINE

## 2022-03-22 PROCEDURE — 93321 DOPPLER ECHO F-UP/LMTD STD: CPT | Performed by: INTERNAL MEDICINE

## 2022-03-22 PROCEDURE — 93325 DOPPLER ECHO COLOR FLOW MAPG: CPT | Performed by: INTERNAL MEDICINE

## 2022-03-22 PROCEDURE — 93321 DOPPLER ECHO F-UP/LMTD STD: CPT

## 2022-03-22 PROCEDURE — 93325 DOPPLER ECHO COLOR FLOW MAPG: CPT

## 2022-03-22 PROCEDURE — 96365 THER/PROPH/DIAG IV INF INIT: CPT

## 2022-03-22 PROCEDURE — 93308 TTE F-UP OR LMTD: CPT

## 2022-03-22 RX ORDER — SODIUM CHLORIDE, SODIUM LACTATE, POTASSIUM CHLORIDE, CALCIUM CHLORIDE 600; 310; 30; 20 MG/100ML; MG/100ML; MG/100ML; MG/100ML
1500 INJECTION, SOLUTION INTRAVENOUS ONCE
Status: CANCELLED
Start: 2022-03-24 | End: 2022-03-23

## 2022-03-22 RX ORDER — SODIUM CHLORIDE, SODIUM LACTATE, POTASSIUM CHLORIDE, CALCIUM CHLORIDE 600; 310; 30; 20 MG/100ML; MG/100ML; MG/100ML; MG/100ML
1500 INJECTION, SOLUTION INTRAVENOUS ONCE
Status: COMPLETED | OUTPATIENT
Start: 2022-03-22 | End: 2022-03-22

## 2022-03-22 RX ORDER — METHYLPREDNISOLONE SODIUM SUCCINATE 125 MG/2ML
60 INJECTION, POWDER, LYOPHILIZED, FOR SOLUTION INTRAMUSCULAR; INTRAVENOUS ONCE
Status: COMPLETED | OUTPATIENT
Start: 2022-03-22 | End: 2022-03-22

## 2022-03-22 RX ORDER — METHYLPREDNISOLONE SODIUM SUCCINATE 125 MG/2ML
60 INJECTION, POWDER, LYOPHILIZED, FOR SOLUTION INTRAMUSCULAR; INTRAVENOUS ONCE
Status: CANCELLED
Start: 2022-03-23 | End: 2022-03-23

## 2022-03-22 RX ADMIN — DIPHENHYDRAMINE HYDROCHLORIDE 50 MG: 50 INJECTION, SOLUTION INTRAMUSCULAR; INTRAVENOUS at 12:12

## 2022-03-22 RX ADMIN — SODIUM CHLORIDE, SODIUM LACTATE, POTASSIUM CHLORIDE, AND CALCIUM CHLORIDE 1500 ML: .6; .31; .03; .02 INJECTION, SOLUTION INTRAVENOUS at 12:06

## 2022-03-22 RX ADMIN — METHYLPREDNISOLONE SODIUM SUCCINATE 60 MG: 125 INJECTION, POWDER, FOR SOLUTION INTRAMUSCULAR; INTRAVENOUS at 12:11

## 2022-03-22 NOTE — PLAN OF CARE
Problem: Potential for Falls  Goal: Patient will remain free of falls  Description: INTERVENTIONS:  - Educate patient/family on patient safety including physical limitations  - Instruct patient to call for assistance with activity   - Consult OT/PT to assist with strengthening/mobility   - Keep Call bell within reach  - Keep bed low and locked with side rails adjusted as appropriate  - Keep care items and personal belongings within reach  - Initiate and maintain comfort rounds  - Make Fall Risk Sign visible to staff  - Off- Apply yellow socks and bracelet for high fall risk patients  - Consider moving patient to room near nurses station  Outcome: Progressing

## 2022-03-23 ENCOUNTER — APPOINTMENT (OUTPATIENT)
Dept: LAB | Facility: HOSPITAL | Age: 47
End: 2022-03-23
Attending: INTERNAL MEDICINE
Payer: COMMERCIAL

## 2022-03-23 ENCOUNTER — HOSPITAL ENCOUNTER (OUTPATIENT)
Dept: NON INVASIVE DIAGNOSTICS | Facility: HOSPITAL | Age: 47
Discharge: HOME | End: 2022-03-23
Attending: INTERNAL MEDICINE
Payer: COMMERCIAL

## 2022-03-23 DIAGNOSIS — K52.9 INFLAMMATORY BOWEL DISEASE: ICD-10-CM

## 2022-03-23 DIAGNOSIS — I47.1 ATRIAL TACHYCARDIA (HCC): ICD-10-CM

## 2022-03-23 DIAGNOSIS — Z86.16 HISTORY OF COVID-19: ICD-10-CM

## 2022-03-23 DIAGNOSIS — D84.9 IMMUNOSUPPRESSION-RELATED INFECTIOUS DISEASE (HCC): ICD-10-CM

## 2022-03-23 DIAGNOSIS — D72.829 LEUKOCYTOSIS, UNSPECIFIED TYPE: ICD-10-CM

## 2022-03-23 DIAGNOSIS — K50.119 CROHN'S DISEASE OF LARGE INTESTINE WITH COMPLICATION (HCC): ICD-10-CM

## 2022-03-23 DIAGNOSIS — B99.8 IMMUNOSUPPRESSION-RELATED INFECTIOUS DISEASE (HCC): ICD-10-CM

## 2022-03-23 DIAGNOSIS — R00.2 HEART PALPITATIONS: ICD-10-CM

## 2022-03-23 LAB
ALBUMIN SERPL BCP-MCNC: 3.4 G/DL (ref 3.5–5)
ALP SERPL-CCNC: 58 U/L (ref 46–116)
ALT SERPL W P-5'-P-CCNC: 23 U/L (ref 12–78)
ANION GAP SERPL CALCULATED.3IONS-SCNC: 12 MMOL/L (ref 4–13)
AST SERPL W P-5'-P-CCNC: 14 U/L (ref 5–45)
BASOPHILS # BLD AUTO: 0.09 THOUSANDS/ΜL (ref 0–0.1)
BASOPHILS NFR BLD AUTO: 1 % (ref 0–1)
BILIRUB SERPL-MCNC: 0.58 MG/DL (ref 0.2–1)
BUN SERPL-MCNC: 14 MG/DL (ref 5–25)
CALCIUM ALBUM COR SERPL-MCNC: 9.2 MG/DL (ref 8.3–10.1)
CALCIUM SERPL-MCNC: 8.7 MG/DL (ref 8.3–10.1)
CHLORIDE SERPL-SCNC: 108 MMOL/L (ref 100–108)
CO2 SERPL-SCNC: 25 MMOL/L (ref 21–32)
CREAT SERPL-MCNC: 0.87 MG/DL (ref 0.6–1.3)
CRP SERPL QL: 1.8 MG/L
EOSINOPHIL # BLD AUTO: 0.05 THOUSAND/ΜL (ref 0–0.61)
EOSINOPHIL NFR BLD AUTO: 0 % (ref 0–6)
ERYTHROCYTE [DISTWIDTH] IN BLOOD BY AUTOMATED COUNT: 13.6 % (ref 11.6–15.1)
GFR SERPL CREATININE-BSD FRML MDRD: 78 ML/MIN/1.73SQ M
GLUCOSE P FAST SERPL-MCNC: 79 MG/DL (ref 65–99)
HCT VFR BLD AUTO: 39.5 % (ref 34.8–46.1)
HGB BLD-MCNC: 13.1 G/DL (ref 11.5–15.4)
IMM GRANULOCYTES # BLD AUTO: 0.06 THOUSAND/UL (ref 0–0.2)
IMM GRANULOCYTES NFR BLD AUTO: 0 % (ref 0–2)
LYMPHOCYTES # BLD AUTO: 1.76 THOUSANDS/ΜL (ref 0.6–4.47)
LYMPHOCYTES NFR BLD AUTO: 12 % (ref 14–44)
MCH RBC QN AUTO: 29.4 PG (ref 26.8–34.3)
MCHC RBC AUTO-ENTMCNC: 33.2 G/DL (ref 31.4–37.4)
MCV RBC AUTO: 89 FL (ref 82–98)
MONOCYTES # BLD AUTO: 0.82 THOUSAND/ΜL (ref 0.17–1.22)
MONOCYTES NFR BLD AUTO: 6 % (ref 4–12)
NEUTROPHILS # BLD AUTO: 11.61 THOUSANDS/ΜL (ref 1.85–7.62)
NEUTS SEG NFR BLD AUTO: 81 % (ref 43–75)
NRBC BLD AUTO-RTO: 0 /100 WBCS
PLATELET # BLD AUTO: 397 THOUSANDS/UL (ref 149–390)
PMV BLD AUTO: 9.3 FL (ref 8.9–12.7)
POTASSIUM SERPL-SCNC: 3.5 MMOL/L (ref 3.5–5.3)
PROT SERPL-MCNC: 7.2 G/DL (ref 6.4–8.2)
RBC # BLD AUTO: 4.45 MILLION/UL (ref 3.81–5.12)
SODIUM SERPL-SCNC: 145 MMOL/L (ref 136–145)
WBC # BLD AUTO: 14.39 THOUSAND/UL (ref 4.31–10.16)

## 2022-03-23 PROCEDURE — 86769 SARS-COV-2 COVID-19 ANTIBODY: CPT

## 2022-03-23 PROCEDURE — 86140 C-REACTIVE PROTEIN: CPT

## 2022-03-23 PROCEDURE — 36415 COLL VENOUS BLD VENIPUNCTURE: CPT

## 2022-03-23 PROCEDURE — 93225 XTRNL ECG REC<48 HRS REC: CPT

## 2022-03-23 PROCEDURE — 80053 COMPREHEN METABOLIC PANEL: CPT

## 2022-03-23 PROCEDURE — 85025 COMPLETE CBC W/AUTO DIFF WBC: CPT

## 2022-03-23 PROCEDURE — 93226 XTRNL ECG REC<48 HR SCAN A/R: CPT

## 2022-03-24 ENCOUNTER — DOCUMENTATION (OUTPATIENT)
Dept: FAMILY MEDICINE CLINIC | Facility: CLINIC | Age: 47
End: 2022-03-24

## 2022-03-24 ENCOUNTER — HOSPITAL ENCOUNTER (OUTPATIENT)
Dept: INFUSION CENTER | Facility: CLINIC | Age: 47
Discharge: HOME | End: 2022-03-24
Payer: COMMERCIAL

## 2022-03-24 VITALS
RESPIRATION RATE: 18 BRPM | SYSTOLIC BLOOD PRESSURE: 142 MMHG | DIASTOLIC BLOOD PRESSURE: 84 MMHG | TEMPERATURE: 97.6 F | HEART RATE: 94 BPM

## 2022-03-24 DIAGNOSIS — E44.1 MILD PROTEIN-CALORIE MALNUTRITION (HCC): ICD-10-CM

## 2022-03-24 DIAGNOSIS — R19.7 DIARRHEA, UNSPECIFIED TYPE: ICD-10-CM

## 2022-03-24 DIAGNOSIS — R63.0 ANOREXIA: Primary | ICD-10-CM

## 2022-03-24 LAB
SARS-COV-2 IGG SERPL QL IA: REACTIVE
SARS-COV-2 IGG+IGM SERPL QL IA: REACTIVE

## 2022-03-24 PROCEDURE — 96375 TX/PRO/DX INJ NEW DRUG ADDON: CPT

## 2022-03-24 PROCEDURE — 96365 THER/PROPH/DIAG IV INF INIT: CPT

## 2022-03-24 PROCEDURE — 96361 HYDRATE IV INFUSION ADD-ON: CPT

## 2022-03-24 RX ORDER — METHYLPREDNISOLONE SODIUM SUCCINATE 125 MG/2ML
60 INJECTION, POWDER, LYOPHILIZED, FOR SOLUTION INTRAMUSCULAR; INTRAVENOUS ONCE
Status: COMPLETED | OUTPATIENT
Start: 2022-03-24 | End: 2022-03-24

## 2022-03-24 RX ORDER — SODIUM CHLORIDE, SODIUM LACTATE, POTASSIUM CHLORIDE, CALCIUM CHLORIDE 600; 310; 30; 20 MG/100ML; MG/100ML; MG/100ML; MG/100ML
1500 INJECTION, SOLUTION INTRAVENOUS ONCE
Status: COMPLETED | OUTPATIENT
Start: 2022-03-24 | End: 2022-03-24

## 2022-03-24 RX ORDER — METHYLPREDNISOLONE SODIUM SUCCINATE 125 MG/2ML
60 INJECTION, POWDER, LYOPHILIZED, FOR SOLUTION INTRAMUSCULAR; INTRAVENOUS ONCE
Status: CANCELLED
Start: 2022-03-24 | End: 2022-03-24

## 2022-03-24 RX ORDER — METHYLPREDNISOLONE SODIUM SUCCINATE 125 MG/2ML
60 INJECTION, POWDER, LYOPHILIZED, FOR SOLUTION INTRAMUSCULAR; INTRAVENOUS ONCE
Status: CANCELLED
Start: 2022-03-25 | End: 2022-03-25

## 2022-03-24 RX ORDER — METHYLPREDNISOLONE SODIUM SUCCINATE 125 MG/2ML
60 INJECTION, POWDER, LYOPHILIZED, FOR SOLUTION INTRAMUSCULAR; INTRAVENOUS ONCE
Status: CANCELLED
Start: 2022-03-28 | End: 2022-03-25

## 2022-03-24 RX ORDER — SODIUM CHLORIDE, SODIUM LACTATE, POTASSIUM CHLORIDE, CALCIUM CHLORIDE 600; 310; 30; 20 MG/100ML; MG/100ML; MG/100ML; MG/100ML
1500 INJECTION, SOLUTION INTRAVENOUS ONCE
Status: CANCELLED
Start: 2022-03-28 | End: 2022-03-25

## 2022-03-24 RX ADMIN — SODIUM CHLORIDE, SODIUM LACTATE, POTASSIUM CHLORIDE, AND CALCIUM CHLORIDE 1500 ML: .6; .31; .03; .02 INJECTION, SOLUTION INTRAVENOUS at 12:26

## 2022-03-24 RX ADMIN — DIPHENHYDRAMINE HYDROCHLORIDE 50 MG: 50 INJECTION, SOLUTION INTRAMUSCULAR; INTRAVENOUS at 12:38

## 2022-03-24 RX ADMIN — METHYLPREDNISOLONE SODIUM SUCCINATE 60 MG: 125 INJECTION, POWDER, FOR SOLUTION INTRAMUSCULAR; INTRAVENOUS at 12:29

## 2022-03-24 NOTE — PROGRESS NOTES
Received a message from patient with concerns for ongoing insomnia despite her current medications  She is currently taking two different antihistamines (diphenhydramine and hydroxyzine) as well as a nightly temazepam  Continues to have difficulty with initiating sleep and with maintaining sleep  Patient also is taking a medication for diabetes and her recent hgb a1c has been within normal limits  POC glucose in the office this week was 83  Plan:   - Returned patient's message and offered a trial of nightly trazodone for insomnia  Will await her response before prescribing    - Other options would be possible but concerned about interactions with her benzodiazepine   - Also recommended that we do a trial off some of the anti-histamines as this could be contributing as well  Would recommend stopping the benadryl first    - I also advised patient to discontinue the linagliptin she is taking as she does not have diabetes       Devaughn Doss, DO

## 2022-03-24 NOTE — PROGRESS NOTES
Pt arrived to unit without compliant  Pt tolerated hydration without incident  AVS declined, but aware of future appts  Pt left unit in stable condition

## 2022-03-25 PROCEDURE — 93227 XTRNL ECG REC<48 HR R&I: CPT | Performed by: INTERNAL MEDICINE

## 2022-03-28 ENCOUNTER — HOSPITAL ENCOUNTER (OUTPATIENT)
Dept: INFUSION CENTER | Facility: CLINIC | Age: 47
Discharge: HOME | End: 2022-03-28
Payer: COMMERCIAL

## 2022-03-28 VITALS
HEART RATE: 88 BPM | TEMPERATURE: 97.5 F | RESPIRATION RATE: 18 BRPM | DIASTOLIC BLOOD PRESSURE: 84 MMHG | SYSTOLIC BLOOD PRESSURE: 128 MMHG

## 2022-03-28 DIAGNOSIS — R63.0 ANOREXIA: Primary | ICD-10-CM

## 2022-03-28 DIAGNOSIS — K52.9 INFLAMMATORY BOWEL DISEASE: ICD-10-CM

## 2022-03-28 DIAGNOSIS — R19.7 DIARRHEA, UNSPECIFIED TYPE: ICD-10-CM

## 2022-03-28 DIAGNOSIS — E44.1 MILD PROTEIN-CALORIE MALNUTRITION (HCC): ICD-10-CM

## 2022-03-28 PROCEDURE — 96365 THER/PROPH/DIAG IV INF INIT: CPT

## 2022-03-28 PROCEDURE — 96361 HYDRATE IV INFUSION ADD-ON: CPT

## 2022-03-28 PROCEDURE — 96375 TX/PRO/DX INJ NEW DRUG ADDON: CPT

## 2022-03-28 RX ORDER — SODIUM CHLORIDE, SODIUM LACTATE, POTASSIUM CHLORIDE, CALCIUM CHLORIDE 600; 310; 30; 20 MG/100ML; MG/100ML; MG/100ML; MG/100ML
1500 INJECTION, SOLUTION INTRAVENOUS ONCE
Status: CANCELLED
Start: 2022-03-31 | End: 2022-03-30

## 2022-03-28 RX ORDER — PANTOPRAZOLE SODIUM 40 MG/1
40 TABLET, DELAYED RELEASE ORAL 2 TIMES DAILY
Qty: 60 TABLET | Refills: 3 | Status: SHIPPED | OUTPATIENT
Start: 2022-03-28

## 2022-03-28 RX ORDER — SODIUM CHLORIDE, SODIUM LACTATE, POTASSIUM CHLORIDE, CALCIUM CHLORIDE 600; 310; 30; 20 MG/100ML; MG/100ML; MG/100ML; MG/100ML
1500 INJECTION, SOLUTION INTRAVENOUS ONCE
Status: COMPLETED | OUTPATIENT
Start: 2022-03-28 | End: 2022-03-28

## 2022-03-28 RX ORDER — METHYLPREDNISOLONE SODIUM SUCCINATE 125 MG/2ML
60 INJECTION, POWDER, LYOPHILIZED, FOR SOLUTION INTRAMUSCULAR; INTRAVENOUS ONCE
Status: CANCELLED
Start: 2022-04-04 | End: 2022-03-30

## 2022-03-28 RX ORDER — METHYLPREDNISOLONE SODIUM SUCCINATE 125 MG/2ML
60 INJECTION, POWDER, LYOPHILIZED, FOR SOLUTION INTRAMUSCULAR; INTRAVENOUS ONCE
Status: COMPLETED | OUTPATIENT
Start: 2022-03-28 | End: 2022-03-28

## 2022-03-28 RX ADMIN — METHYLPREDNISOLONE SODIUM SUCCINATE 60 MG: 125 INJECTION, POWDER, FOR SOLUTION INTRAMUSCULAR; INTRAVENOUS at 11:33

## 2022-03-28 RX ADMIN — SODIUM CHLORIDE, SODIUM LACTATE, POTASSIUM CHLORIDE, AND CALCIUM CHLORIDE 1500 ML: .6; .31; .03; .02 INJECTION, SOLUTION INTRAVENOUS at 11:33

## 2022-03-28 RX ADMIN — DIPHENHYDRAMINE HYDROCHLORIDE 50 MG: 50 INJECTION, SOLUTION INTRAMUSCULAR; INTRAVENOUS at 11:36

## 2022-03-28 NOTE — TELEPHONE ENCOUNTER
Spoke to patient, pantoprazole script was sent to her pharmacy  Verbalized understanding, no further questions

## 2022-03-31 ENCOUNTER — HOSPITAL ENCOUNTER (OUTPATIENT)
Dept: INFUSION CENTER | Facility: CLINIC | Age: 47
End: 2022-03-31

## 2022-04-01 ENCOUNTER — HOSPITAL ENCOUNTER (OUTPATIENT)
Dept: INFUSION CENTER | Facility: CLINIC | Age: 47
Discharge: HOME | End: 2022-04-01
Payer: COMMERCIAL

## 2022-04-01 VITALS
RESPIRATION RATE: 18 BRPM | SYSTOLIC BLOOD PRESSURE: 111 MMHG | DIASTOLIC BLOOD PRESSURE: 80 MMHG | HEART RATE: 111 BPM | TEMPERATURE: 96 F

## 2022-04-01 DIAGNOSIS — R19.7 DIARRHEA, UNSPECIFIED TYPE: ICD-10-CM

## 2022-04-01 DIAGNOSIS — E44.1 MILD PROTEIN-CALORIE MALNUTRITION (HCC): ICD-10-CM

## 2022-04-01 DIAGNOSIS — R63.0 ANOREXIA: Primary | ICD-10-CM

## 2022-04-01 PROCEDURE — 96361 HYDRATE IV INFUSION ADD-ON: CPT

## 2022-04-01 PROCEDURE — 96360 HYDRATION IV INFUSION INIT: CPT

## 2022-04-01 RX ORDER — SODIUM CHLORIDE, SODIUM LACTATE, POTASSIUM CHLORIDE, CALCIUM CHLORIDE 600; 310; 30; 20 MG/100ML; MG/100ML; MG/100ML; MG/100ML
1500 INJECTION, SOLUTION INTRAVENOUS ONCE
Status: CANCELLED
Start: 2022-04-03 | End: 2022-04-03

## 2022-04-01 RX ORDER — SODIUM CHLORIDE, SODIUM LACTATE, POTASSIUM CHLORIDE, CALCIUM CHLORIDE 600; 310; 30; 20 MG/100ML; MG/100ML; MG/100ML; MG/100ML
1500 INJECTION, SOLUTION INTRAVENOUS ONCE
Status: COMPLETED | OUTPATIENT
Start: 2022-04-01 | End: 2022-04-01

## 2022-04-01 RX ORDER — METHYLPREDNISOLONE SODIUM SUCCINATE 125 MG/2ML
60 INJECTION, POWDER, LYOPHILIZED, FOR SOLUTION INTRAMUSCULAR; INTRAVENOUS ONCE
Status: CANCELLED
Start: 2022-04-05 | End: 2022-04-03

## 2022-04-01 RX ADMIN — SODIUM CHLORIDE, SODIUM LACTATE, POTASSIUM CHLORIDE, AND CALCIUM CHLORIDE 1500 ML: .6; .31; .03; .02 INJECTION, SOLUTION INTRAVENOUS at 10:44

## 2022-04-04 ENCOUNTER — HOSPITAL ENCOUNTER (OUTPATIENT)
Dept: INFUSION CENTER | Facility: CLINIC | Age: 47
Discharge: HOME | End: 2022-04-04

## 2022-04-04 NOTE — ASSESSMENT & PLAN NOTE
Right upper quadrant abdominal pain with fever and leukocytosis  Surgery and GI following  Urgent ERCP is not indicated at this time no suspicion for biliary extraction or cholangitis at this time  Continue with IV fluids and pain control  Continue with Zosyn 04-Apr-2022 03:17

## 2022-04-05 ENCOUNTER — HOSPITAL ENCOUNTER (OUTPATIENT)
Dept: INFUSION CENTER | Facility: CLINIC | Age: 47
Discharge: HOME | End: 2022-04-05
Payer: COMMERCIAL

## 2022-04-05 ENCOUNTER — OFFICE VISIT (OUTPATIENT)
Dept: GASTROENTEROLOGY | Facility: MEDICAL CENTER | Age: 47
End: 2022-04-05
Payer: COMMERCIAL

## 2022-04-05 VITALS
WEIGHT: 156.6 LBS | SYSTOLIC BLOOD PRESSURE: 120 MMHG | DIASTOLIC BLOOD PRESSURE: 82 MMHG | HEART RATE: 96 BPM | BODY MASS INDEX: 26.06 KG/M2 | TEMPERATURE: 98.7 F

## 2022-04-05 VITALS
TEMPERATURE: 97.7 F | RESPIRATION RATE: 16 BRPM | HEART RATE: 106 BPM | SYSTOLIC BLOOD PRESSURE: 120 MMHG | DIASTOLIC BLOOD PRESSURE: 89 MMHG

## 2022-04-05 DIAGNOSIS — E44.1 MILD PROTEIN-CALORIE MALNUTRITION (HCC): ICD-10-CM

## 2022-04-05 DIAGNOSIS — D89.40 MAST CELL ACTIVATION SYNDROME (HCC): ICD-10-CM

## 2022-04-05 DIAGNOSIS — R19.7 DIARRHEA, UNSPECIFIED TYPE: ICD-10-CM

## 2022-04-05 DIAGNOSIS — R79.89 LOW VITAMIN D LEVEL: ICD-10-CM

## 2022-04-05 DIAGNOSIS — D84.9 IMMUNOSUPPRESSED STATUS (HCC): ICD-10-CM

## 2022-04-05 DIAGNOSIS — K50.119 CROHN'S DISEASE OF COLON WITH COMPLICATION (HCC): Primary | ICD-10-CM

## 2022-04-05 DIAGNOSIS — R63.0 ANOREXIA: Primary | ICD-10-CM

## 2022-04-05 DIAGNOSIS — Z79.52 CURRENT CHRONIC USE OF SYSTEMIC STEROIDS: ICD-10-CM

## 2022-04-05 PROCEDURE — 96365 THER/PROPH/DIAG IV INF INIT: CPT

## 2022-04-05 PROCEDURE — 99214 OFFICE O/P EST MOD 30 MIN: CPT | Performed by: INTERNAL MEDICINE

## 2022-04-05 PROCEDURE — 96361 HYDRATE IV INFUSION ADD-ON: CPT

## 2022-04-05 PROCEDURE — 96375 TX/PRO/DX INJ NEW DRUG ADDON: CPT

## 2022-04-05 RX ORDER — METHYLPREDNISOLONE SODIUM SUCCINATE 125 MG/2ML
60 INJECTION, POWDER, LYOPHILIZED, FOR SOLUTION INTRAMUSCULAR; INTRAVENOUS ONCE
Status: COMPLETED | OUTPATIENT
Start: 2022-04-05 | End: 2022-04-05

## 2022-04-05 RX ORDER — METHYLPREDNISOLONE SODIUM SUCCINATE 125 MG/2ML
60 INJECTION, POWDER, LYOPHILIZED, FOR SOLUTION INTRAMUSCULAR; INTRAVENOUS ONCE
Status: CANCELLED
Start: 2022-04-07 | End: 2022-04-07

## 2022-04-05 RX ORDER — SODIUM CHLORIDE, SODIUM LACTATE, POTASSIUM CHLORIDE, CALCIUM CHLORIDE 600; 310; 30; 20 MG/100ML; MG/100ML; MG/100ML; MG/100ML
1500 INJECTION, SOLUTION INTRAVENOUS ONCE
Status: COMPLETED | OUTPATIENT
Start: 2022-04-05 | End: 2022-04-05

## 2022-04-05 RX ORDER — SODIUM CHLORIDE, SODIUM LACTATE, POTASSIUM CHLORIDE, CALCIUM CHLORIDE 600; 310; 30; 20 MG/100ML; MG/100ML; MG/100ML; MG/100ML
1500 INJECTION, SOLUTION INTRAVENOUS ONCE
Status: CANCELLED
Start: 2022-04-07 | End: 2022-04-07

## 2022-04-05 RX ADMIN — METHYLPREDNISOLONE SODIUM SUCCINATE 60 MG: 125 INJECTION, POWDER, FOR SOLUTION INTRAMUSCULAR; INTRAVENOUS at 11:10

## 2022-04-05 RX ADMIN — SODIUM CHLORIDE, SODIUM LACTATE, POTASSIUM CHLORIDE, AND CALCIUM CHLORIDE 1500 ML: .6; .31; .03; .02 INJECTION, SOLUTION INTRAVENOUS at 11:13

## 2022-04-05 RX ADMIN — DIPHENHYDRAMINE HYDROCHLORIDE 50 MG: 50 INJECTION, SOLUTION INTRAMUSCULAR; INTRAVENOUS at 11:12

## 2022-04-05 NOTE — PROGRESS NOTES
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), IVIG, prior diagnosis of MCAS who presents for follow-up  She notes symptoms 1 week prior to stelara injection but her levels were good  CMP with albumin 3 4 but otherwise normal  CBC with WBC 14 but normal Hgb and plt  Prior C diff negative, calprotectin 78, normal fats and elastase  CRP 1 8  MRI/MRCP 7/2021 with no progression of likely post-CCY bile duct prominenance  EGD and colonoscopy grossly normal; biopsies with some patchy chronic active left colitis  Recent Echo with good EF  1  Crohn's disease of colon with complication ([address] )    2  Immunosuppressed status ([facility] )    3  Mild protein-calorie malnutrition ([facility] [facility] )    4  Current chronic use of systemic steroids    5  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[facility] [address] )    6  Low vitamin D level        No orders of the defined types were placed in this encounter  Continue Stelara every 4 weeks  Next quant gold 1/2023  Start OTC curcumin 1000mg per day  Next set of blood work at next visit  Try to avoid restarting steroids  Continue IV fluids and pre-meds for IVIG per patient request  Taper PPI  Follow-up with Dr Darling Leonardo from [facility]  Follow-up with the rheumatology  Follow-up with cardiology      ______________________________________________________________________    SUBJECTIVE:    Aliyah Kaplan is a 50 y o  female who presents with complaint of Crohn's here for follow-up  She was doing well but last Wednesday she hit a brick wall and now not good but not too bad  Thursday she was vomiting, had fever, she felt like she had the flu  Today she got out of bed, but she would otherwise be in bed  Making herself drink and eat  No one else int he house is sick   One night of feeling rough and the rest she just felt exhausted  She was otherwise doing well and dose good until 21 days of her Stelara  She shifted her Jean Reza so it will be 2 weeks after her Stelara  She had 2 weeks off prednisone or budesonide  After 21 days she might have joint pains, then slight change in bowel movements, diarrhea with a smell, vision changes  She notes that curcumin can spike her histamine  IVIG going well  She is perplexed because after supper she will go for a 6-7 mile walk, shower and go to bed and when she wakes up she gained weight and she believes she gained weight  REVIEW OF SYSTEMS IS OTHERWISE NEGATIVE  10 point ROS reviewed and negative, except as above      Historical Information   Past Medical History:   Diagnosis Date    Anxiety     Asthma     Chronic kidney disease     Deep vein thrombosis (HCC)     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    Generalized anxiety disorder 8/26/2017    Heart murmur     Mast cell disease     Migraine     Myocardial infarction [facility])     NSTEMI  pt denies, documented in cardio note    Pancreatitis     sphinger of     Psychiatric disorder     RA (rheumatoid arthritis) ([address] )     Ulcerative colitis ([address] )      Past Surgical History:   Procedure Laterality Date    APPENDECTOMY      CHOLECYSTECTOMY      EGD AND COLONOSCOPY N/A 9/12/2017    Procedure: EGD AND COLONOSCOPY;  Surgeon: Linsey Faustin MD;  [facility]: [facility]; Service: Gastroenterology    ERCP N/A 9/15/2017    Procedure: ENDOSCOPIC RETROGRADE CHOLANGIOPANCREATOGRAPHY (ERCP); Surgeon: Adriane Gold MD;  Location[facility];   Service: Gastroenterology    HYSTERECTOMY      KNEE SURGERY Right     LAPAROSCOPIC ENDOMETRIOSIS FULGURATION      ORIF ANKLE FRACTURE Left     VA KNEE SCOPE,MED/LAT MENISECTOMY Left 5/12/2017    Procedure: POSSIBLE MEDIAL MENISECTOMY;  Surgeon: David Webb MD;  Location: [facility];  Service: Orthopedics    VA KNEE SCOPE,SHAVE ARTICULAR CART Left 5/12/2017    Procedure: KNEE ARTHROSCOPY EVALUATION, CHONDROPLASTY;  Surgeon: Richie Weber MD;  Location: [facility];  Service: Orthopedics    IL LAP,DIAGNOSTIC ABDOMEN N/A 12/12/2017    Procedure: LAPAROSCOPY DIAGNOSTIC  WITH LYSIS OF ADHESIONS;  Surgeon: Evan Murphy DO;  Location:  MAIN OR;  Service: General     Social History   Social History     Substance and Sexual Activity   Alcohol Use Never     Social History     Substance and Sexual Activity   Drug Use Not Currently     Social History     Tobacco Use   Smoking Status Never Smoker   Smokeless Tobacco Never Used     Family History   Problem Relation Age of Onset    Ulcerative colitis Mother     Heart failure Father     Neuropathy Father     Macular degeneration Father     Diabetes Father     Asthma Daughter     Hypothyroidism Daughter     Heart disease Maternal Grandmother     Arthritis Maternal Grandmother     Arthritis Paternal Grandmother     Macular degeneration Paternal Grandmother     Lymphoma Paternal Grandfather     Heart failure Paternal Aunt     Heart failure Paternal Aunt     Breast cancer Paternal Aunt 47    Breast cancer additional onset Paternal Aunt 62    Hypothyroidism Paternal Aunt     Breast cancer Maternal Aunt     No Known Problems Maternal Grandfather        Meds/Allergies       Current Outpatient Medications:     budesonide (ENTOCORT EC) 3 MG capsule    butalbital-acetaminophen-caffeine (FIORICET,ESGIC) -40 mg per tablet    cetirizine (ZyrTEC) 10 mg tablet    cyanocobalamin 1,000 mcg/mL    diphenhydrAMINE (BENADRYL) 25 mg tablet    EPINEPHrine (EPIPEN 2-MARYSOL IJ)    ergocalciferol (VITAMIN D2) 50,000 units    famotidine (PEPCID) 40 MG tablet    hydrOXYzine HCL (ATARAX) 25 mg tablet    linaGLIPtin 5 MG TABS    liothyronine (CYTOMEL) 5 mcg tablet    LORazepam (ATIVAN) 1 mg tablet    montelukast (SINGULAIR) 10 mg tablet    omalizumab (XOLAIR) 150 mg    ondansetron (ZOFRAN-ODT) 4 mg disintegrating tablet    pantoprazole (PROTONIX) 40 mg tablet    Stelara 90 MG/ML subcutaneous injection    Synthroid 75 MCG tablet    temazepam (RESTORIL) 30 mg capsule    acetaminophen (TYLENOL) 500 mg tablet    cholecalciferol (VITAMIN D) 400 units/mL    diclofenac (VOLTAREN) 75 mg EC tablet    ergocalciferol (VITAMIN D2) 50,000 units    levalbuterol (XOPENEX HFA) 45 mcg/act inhaler    levothyroxine 50 mcg tablet    NON FORMULARY    predniSONE 20 mg tablet    Riboflavin (Vitamin B-2) 100 MG TABS    simethicone (MYLICON) 774 MG chewable tablet  No current facility-administered medications for this visit      Allergies   Allergen Reactions    Amitriptyline Anaphylaxis     According to the patient she had nphylaxis    Aspergillus Fumigatus Other (See Comments), Hives and Swelling    Azathioprine Other (See Comments) and Anaphylaxis     pancreatitis  According to patient she needed Epipen    Buspar [Buspirone] Hives and Shortness Of Breath    Corn Dextrin Anaphylaxis     Any corn based products    Eggs Or Egg-Derived Products - Food Allergy Anaphylaxis    Gelatin - Food Allergy Anaphylaxis    Lactated Ringers Shortness Of Breath     Pt questions this allergy, pt has received LR multiple times without reaction    Malto Dextrin [Dextrin] Anaphylaxis    Other Anaphylaxis     Gel caps, maltodextrose, dextrose,grapes    Penicillium Notatum Shortness Of Breath and Swelling    Sumatriptan Anaphylaxis     Bradycardia, sob, back pressure, head pain,throat tightness  According to the patient she had anphylaxis    Blue Dyes (Parenteral) - Food Allergy Other (See Comments)     intolerance    Contrast [Iodinated Diagnostic Agents] Other (See Comments)     Pt states needs to be premedicated prior to injection   [employer] - Food Allergy - Food Allergy Hives    Humira [Adalimumab] Other (See Comments)     "I develop drug induced lupus"    Ibuprofen Hives and Throat Swelling    Red Dye - Food Allergy Other (See Comments)     intolerance    Remicade [Infliximab] Other (See Comments)     "I develop drug induced lupus"    Sulfa Antibiotics Hives and Other (See Comments)    Sulfate Hives    Sulfites - Food Allergy Hives    Chlorhexidine Itching    Histamine Hives, Rash and Other (See Comments)     Intolerance             Objective     Blood pressure 120/82, pulse 96, temperature 98 7 °F (37 1 °C), temperature source Probe, weight 71 kg (156 lb 9 6 oz), not currently breastfeeding  Body mass index is 26 06 kg/m²  PHYSICAL EXAMINATION:    General Appearance:   Alert, cooperative, no distress   HEENT:  Normocephalic, atraumatic, anicteric  Neck supple, symmetrical, trachea midline  Lungs:   Equal chest rise and unlabored breathing, normal effort, no coughing  Cardiovascular:   No visualized JVD  Abdomen:   No abdominal distension  Skin:   No jaundice, rashes, or lesions  Musculoskeletal:   Normal range of motion visualized  Psych:  Normal affect and normal insight  Neuro:  Alert and appropriate  Lab Results:   No visits with results within 1 Day(s) from this visit     Latest known visit with results is:   Appointment on 03/23/2022   Component Date Value    WBC 03/23/2022 14 39*    RBC 03/23/2022 4 45     Hemoglobin 03/23/2022 13 1     Hematocrit 03/23/2022 39 5     MCV 03/23/2022 89     MCH 03/23/2022 29 4     MCHC 03/23/2022 33 2     RDW 03/23/2022 13 6     MPV 03/23/2022 9 3     Platelets 93/49/4436 397*    nRBC 03/23/2022 0     Neutrophils Relative 03/23/2022 81*    Immat GRANS % 03/23/2022 0     Lymphocytes Relative 03/23/2022 12*    Monocytes Relative 03/23/2022 6     Eosinophils Relative 03/23/2022 0     Basophils Relative 03/23/2022 1     Neutrophils Absolute 03/23/2022 11 61*    Immature Grans Absolute 03/23/2022 0 06     Lymphocytes Absolute 03/23/2022 1 76     Monocytes Absolute 03/23/2022 0 82     Eosinophils Absolute 03/23/2022 0 05     Basophils Absolute 03/23/2022 0 09     SARS-CoV-2 Ab, Total (Ig* 03/23/2022 Reactive*    Sodium 03/23/2022 145     Potassium 03/23/2022 3 5     Chloride 03/23/2022 108     CO2 03/23/2022 25     ANION GAP 03/23/2022 12     BUN 03/23/2022 14     Creatinine 03/23/2022 0 87     Glucose, Fasting 03/23/2022 79     Calcium 03/23/2022 8 7     Corrected Calcium 03/23/2022 9 2     AST 03/23/2022 14     ALT 03/23/2022 23     Alkaline Phosphatase 03/23/2022 58     Total Protein 03/23/2022 7 2     Albumin 03/23/2022 3 4*    Total Bilirubin 03/23/2022 0 58     eGFR 03/23/2022 78     CRP 03/23/2022 1 8     SARS-CoV-2 Ab, IgG 03/23/2022 Reactive*       Lab Results   Component Value Date    WBC 14 39 (H) 03/23/2022    HGB 13 1 03/23/2022    HCT 39 5 03/23/2022    MCV 89 03/23/2022     (H) 03/23/2022       Lab Results   Component Value Date     01/16/2015    SODIUM 145 03/23/2022    K 3 5 03/23/2022     03/23/2022    CO2 25 03/23/2022    ANIONGAP 12 01/16/2015    AGAP 12 03/23/2022    BUN 14 03/23/2022    CREATININE 0 87 03/23/2022    GLUC 116 03/04/2022    GLUF 79 03/23/2022    CALCIUM 8 7 03/23/2022    AST 14 03/23/2022    ALT 23 03/23/2022    ALKPHOS 58 03/23/2022    PROT 7 7 01/16/2015    TP 7 2 03/23/2022    BILITOT 0 7 01/16/2015    TBILI 0 58 03/23/2022    EGFR 78 03/23/2022       Lab Results   Component Value Date    CRP 1 8 03/23/2022       Lab Results   Component Value Date    [id number] 0 586 03/04/2022       Lab Results   Component Value Date    IRON 190 (H) 11/01/2019    TIBC 355 11/01/2019    FERRITIN 42 11/09/2020       Radiology Results:   Echo follow up/limited w/ contrast if indicated    Result Date: 3/22/2022  Narrative: Dinh Elias  Left Ventricle: Left ventricular cavity size is normal  Wall thickness is normal  The left ventricular ejection fraction is 60-65%   Systolic function is normal  Wall motion is normal    Right Ventricle: Right ventricular cavity size is normal  Systolic function is normal  Holter monitor    Result Date: 3/25/2022  Narrative: INDICATIONS:  Palpitations DESCRIPTION OF FINDINGS: The patient was monitored for a total of 48 hours  The patient was predominantly noted to be in sinus rhythm throughout the study  The average heart rate was 89 beats per minute  The heart rate ranged from a low of 56 beats per minute in sinus bradycardia at 11:55 p m  to a maximum of 158 beats per minute in sinus tachycardia at 5:34 p m  Adrian Simms Ventricular ectopic activity consisted of 463 PVCs (0 2 % of total beats)  There was no sustained or nonsustained ventricular tachycardia  Supraventricular ectopic activity consisted of 33 PACs  There was no supraventricular tachycardia identified  There was no evidence of atrial fibrillation or atrial flutter  There were no significant pauses  The longest R-R interval was 1 4 seconds  There was no evidence of advanced degree heart block  The accompanying patient diary notes symptoms of palpitations  Correlation with the tracings at these times reveals sinus tachycardia       Impression: Sinus rhythm rare PACs and PVCs

## 2022-04-05 NOTE — PROGRESS NOTES
Pt  Denies new symptoms or concerns today  Solumdrol and Benadryl given today  Pt  Mya Recinos  LR 1500ml over 3 hours

## 2022-04-07 ENCOUNTER — HOSPITAL ENCOUNTER (OUTPATIENT)
Dept: INFUSION CENTER | Facility: CLINIC | Age: 47
Discharge: HOME | End: 2022-04-07
Payer: COMMERCIAL

## 2022-04-07 VITALS
SYSTOLIC BLOOD PRESSURE: 139 MMHG | RESPIRATION RATE: 18 BRPM | DIASTOLIC BLOOD PRESSURE: 97 MMHG | TEMPERATURE: 97.9 F | HEART RATE: 91 BPM

## 2022-04-07 DIAGNOSIS — R63.0 ANOREXIA: Primary | ICD-10-CM

## 2022-04-07 DIAGNOSIS — R19.7 DIARRHEA, UNSPECIFIED TYPE: ICD-10-CM

## 2022-04-07 DIAGNOSIS — E44.1 MILD PROTEIN-CALORIE MALNUTRITION (HCC): ICD-10-CM

## 2022-04-07 PROCEDURE — 96361 HYDRATE IV INFUSION ADD-ON: CPT

## 2022-04-07 PROCEDURE — 96360 HYDRATION IV INFUSION INIT: CPT

## 2022-04-07 RX ORDER — METHYLPREDNISOLONE SODIUM SUCCINATE 125 MG/2ML
60 INJECTION, POWDER, LYOPHILIZED, FOR SOLUTION INTRAMUSCULAR; INTRAVENOUS ONCE
Status: CANCELLED
Start: 2022-04-08 | End: 2022-04-08

## 2022-04-07 RX ORDER — SODIUM CHLORIDE, SODIUM LACTATE, POTASSIUM CHLORIDE, CALCIUM CHLORIDE 600; 310; 30; 20 MG/100ML; MG/100ML; MG/100ML; MG/100ML
1500 INJECTION, SOLUTION INTRAVENOUS ONCE
Status: CANCELLED
Start: 2022-04-08 | End: 2022-04-08

## 2022-04-07 RX ORDER — SODIUM CHLORIDE, SODIUM LACTATE, POTASSIUM CHLORIDE, CALCIUM CHLORIDE 600; 310; 30; 20 MG/100ML; MG/100ML; MG/100ML; MG/100ML
1500 INJECTION, SOLUTION INTRAVENOUS ONCE
Status: COMPLETED | OUTPATIENT
Start: 2022-04-07 | End: 2022-04-07

## 2022-04-07 RX ADMIN — SODIUM CHLORIDE, SODIUM LACTATE, POTASSIUM CHLORIDE, AND CALCIUM CHLORIDE 1500 ML: .6; .31; .03; .02 INJECTION, SOLUTION INTRAVENOUS at 12:20

## 2022-04-08 ENCOUNTER — HOSPITAL ENCOUNTER (EMERGENCY)
Facility: HOSPITAL | Age: 47
Discharge: HOME | End: 2022-04-08
Attending: EMERGENCY MEDICINE
Payer: COMMERCIAL

## 2022-04-08 VITALS
TEMPERATURE: 98 F | OXYGEN SATURATION: 95 % | SYSTOLIC BLOOD PRESSURE: 149 MMHG | DIASTOLIC BLOOD PRESSURE: 94 MMHG | HEART RATE: 91 BPM | RESPIRATION RATE: 20 BRPM

## 2022-04-08 DIAGNOSIS — R41.82 ALTERED MENTAL STATUS, UNSPECIFIED ALTERED MENTAL STATUS TYPE: ICD-10-CM

## 2022-04-08 DIAGNOSIS — Z13.9 ENCOUNTER FOR MEDICAL SCREENING EXAMINATION: Primary | ICD-10-CM

## 2022-04-08 PROCEDURE — 99285 EMERGENCY DEPT VISIT HI MDM: CPT

## 2022-04-08 PROCEDURE — 99284 EMERGENCY DEPT VISIT MOD MDM: CPT | Performed by: EMERGENCY MEDICINE

## 2022-04-08 RX ORDER — DIPHENHYDRAMINE HYDROCHLORIDE 50 MG/ML
25 INJECTION INTRAMUSCULAR; INTRAVENOUS ONCE
Status: DISCONTINUED | OUTPATIENT
Start: 2022-04-08 | End: 2022-04-08

## 2022-04-08 RX ORDER — METHYLPREDNISOLONE SODIUM SUCCINATE 125 MG/2ML
125 INJECTION, POWDER, LYOPHILIZED, FOR SOLUTION INTRAMUSCULAR; INTRAVENOUS ONCE
Status: DISCONTINUED | OUTPATIENT
Start: 2022-04-08 | End: 2022-04-08

## 2022-04-08 RX ORDER — LORAZEPAM 2 MG/ML
1 INJECTION INTRAMUSCULAR ONCE
Status: DISCONTINUED | OUTPATIENT
Start: 2022-04-08 | End: 2022-04-08

## 2022-04-09 NOTE — ED PROVIDER NOTES
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, in addition to ulcerative colitis, and an extensive list of medication allergies  Per the patient has been she is experiencing symptoms related to her mast cell disease  There are sewage problems in her neighborhood and the odors have set off her symptoms  They deny any drug or alcohol abuse   initially alerted registration with concerns that the patient was seizing in the car however the patient was not found to be exhibiting seizure-like symptoms as she was brought into the emergency department room  I have seen this patient before the past and immediately asked if her symptoms are due to her mast cell disease and what her treatment regimen is  The patient reports Solu-Medrol, Benadryl, Ativan"     The patient was requesting medications for mast cell disease  The  at bedside was requesting further workup including labs imaging testing admission and transfer to [facility] so she could be seen by her gastroenterologist Dr Cory Dempsey  Shortly after arrival the patient wished to be discharged so that they could go by private vehicle to [facility] for further evaluation and treatment  Prior to Admission Medications   Prescriptions Last Dose Informant Patient Reported? Taking?    EPINEPHrine (EPIPEN 2-MARYSOL IJ)   Yes No   Sig: EpiPen   prn   LORazepam (ATIVAN) 1 mg tablet   No No   Sig: Take 1 tablet (1 mg total) by mouth every 8 (eight) hours as needed for anxiety   NON FORMULARY   Yes No Sig: immunoglobin sq 6g 30 ml once a week   Patient not taking: Reported on 2021    Riboflavin (Vitamin B-2) 100 MG TABS   No No   Sig: Take 4 tablets (400 mg total) by mouth daily   Stelara 90 MG/ML subcutaneous injection   No No   Simg inject subcutaneously every 28 days   Synthroid 75 MCG tablet   Yes No   acetaminophen (TYLENOL) 500 mg tablet   Yes No   Sig: Take 1,000 mg by mouth every 6 (six) hours as needed for mild pain   Patient not taking: Reported on 2021    budesonide (ENTOCORT EC) 3 MG capsule   No No   Sig: TAKE ONE CAPSULE BY MOUTH 3 TIMES A DAY BEFORE MEALS   butalbital-acetaminophen-caffeine (FIORICET,ESGIC) -40 mg per tablet   No No   Sig: Take 1 tablet by mouth every 8 (eight) hours as needed for migraine   cetirizine (ZyrTEC) 10 mg tablet   Yes No   Sig: Take 20 mg by mouth daily    cholecalciferol (VITAMIN D) 400 units/mL   No No   Sig: Take 2 5 mL (1,000 Units total) by mouth daily   Patient not taking: Reported on 2021    cyanocobalamin 1,000 mcg/mL   Yes No   diclofenac (VOLTAREN) 75 mg EC tablet   No No   Sig: Take 1 tablet (75 mg total) by mouth 2 (two) times a day for 21 days   Patient taking differently: Take 75 mg by mouth as needed    diphenhydrAMINE (BENADRYL) 25 mg tablet   Yes No   Sig: Take 50 mg by mouth 2 (two) times a day    ergocalciferol (VITAMIN D2) 50,000 units   Yes No   ergocalciferol (VITAMIN D2) 50,000 units   Yes No   Sig:   Start: 21 17:37:00 EST, See Instructions, 50,000 Int_Unit PO 1 time per week   Patient not taking: Reported on 2021   famotidine (PEPCID) 40 MG tablet   No No   Sig: Take 1 tablet (40 mg total) by mouth daily   hydrOXYzine HCL (ATARAX) 25 mg tablet   Yes No   Sig: Take 25 mg by mouth 4 (four) times a day    levalbuterol (XOPENEX HFA) 45 mcg/act inhaler   Yes No   Sig: Inhale 1-2 puffs every 4 (four) hours as needed for shortness of breath   Patient not taking: Reported on 2021    levothyroxine 50 mcg tablet   Yes No   Sig: Take 50 mcg by mouth daily   Patient not taking: Reported on 3/21/2022    linaGLIPtin 5 MG TABS   Yes No   Sig: Take 5 mg by mouth daily   liothyronine (CYTOMEL) 5 mcg tablet   Yes No   Sig: Take 5 mcg by mouth daily   montelukast (SINGULAIR) 10 mg tablet   No No   Sig: Take 1 tablet (10 mg total) by mouth daily at bedtime   omalizumab (XOLAIR) 150 mg   Yes No   Sig: Inject 300 mg under the skin every 28 days    ondansetron (ZOFRAN-ODT) 4 mg disintegrating tablet   No No   Sig: Take 1 tablet (4 mg total) by mouth every 6 (six) hours as needed for nausea or vomiting   pantoprazole (PROTONIX) 40 mg tablet   No No   Sig: Take 1 tablet (40 mg total) by mouth 2 (two) times a day   predniSONE 20 mg tablet   No No   Sig: Taper as per physician instructions   Patient not taking: Reported on 4/5/2022    simethicone (MYLICON) 481 MG chewable tablet   Yes No   Sig: Chew 125 mg every 6 (six) hours as needed for flatulence   Patient not taking: Reported on 12/3/2021    temazepam (RESTORIL) 30 mg capsule   No No   Sig: Take 1 capsule (30 mg total) by mouth daily at bedtime as needed for sleep      Facility-Administered Medications: None       Past Medical History:   Diagnosis Date    Anxiety     Asthma     Chronic kidney disease     Deep vein thrombosis (HCC)     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    Generalized anxiety disorder 8/26/2017    Heart murmur     Mast cell disease     Migraine     Myocardial infarction [facility])     NSTEMI  pt denies, documented in cardio note    Pancreatitis     sphinger of     Psychiatric disorder     [facility]oid [facility]) [facility] [address] [facility]     Ulcerative colitis ([facility] [address] [address] )        Past Surgical History:   Procedure Laterality Date    APPENDECTOMY      CHOLECYSTECTOMY      EGD AND COLONOSCOPY N/A 9/12/2017    Procedure: EGD AND COLONOSCOPY;  Surgeon: Linsey Faustin MD;  [facility];   Service: Gastroenterology    ERCP N/A 9/15/2017    Procedure: ENDOSCOPIC RETROGRADE CHOLANGIOPANCREATOGRAPHY (ERCP); Surgeon: Ulises Celestin MD;  [facility]; Service: Gastroenterology    HYSTERECTOMY      KNEE SURGERY Right     LAPAROSCOPIC ENDOMETRIOSIS FULGURATION      ORIF ANKLE FRACTURE Left     AR KNEE SCOPE,MED/LAT MENISECTOMY Left 5/12/2017    Procedure: POSSIBLE MEDIAL MENISECTOMY;  Surgeon: Caden Vences MD;  [facility];  Service: Orthopedics    AR KNEE [address] CART Left 5/12/2017    Procedure: KNEE ARTHROSCOPY EVALUATION, CHONDROPLASTY;  Surgeon: Caden Vences MD;  [facility];  Service: Orthopedics    AR LAP,DIAGNOSTIC ABDOMEN N/A 12/12/2017    Procedure: LAPAROSCOPY DIAGNOSTIC  WITH LYSIS OF ADHESIONS;  Surgeon: Patric Kaminski DO;  [facility];  Service: General       Family History   Problem Relation Age of Onset    Ulcerative colitis Mother     Heart failure Father     Neuropathy Father     Macular degeneration Father     Diabetes Father     Asthma Daughter     Hypothyroidism Daughter     Heart disease Maternal Grandmother     Arthritis Maternal Grandmother     Arthritis Paternal Grandmother     Macular degeneration Paternal Grandmother     Lymphoma Paternal Grandfather     Heart failure Paternal Aunt     Heart failure Paternal [de-identified]     Breast cancer Paternal Aunt 53    Breast cancer additional onset Paternal Aunt 62    Hypothyroidism Paternal Aunt     Breast cancer Maternal Aunt     No Known Problems Maternal Grandfather      I have reviewed and agree with the history as documented      E-Cigarette/Vaping    E-Cigarette Use Never User      E-Cigarette/Vaping Substances     Social History     Tobacco Use    Smoking status: Never Smoker    Smokeless tobacco: Never Used   Vaping Use    Vaping Use: Never used   Substance Use Topics    Alcohol use: Never    Drug use: Not Currently       Review of Systems   Unable to perform ROS: Acuity of condition   Constitutional: Positive for activity change  Psychiatric/Behavioral: Positive for behavioral problems and confusion  Physical Exam  Physical Exam  Vitals and nursing note reviewed  Constitutional:       General: She is awake  Appearance: Normal appearance  She is well-developed and well-groomed  She is not toxic-appearing or diaphoretic  HENT:      Head: Atraumatic  Mouth/Throat:      Mouth: Mucous membranes are moist    Eyes:      General: No scleral icterus  Extraocular Movements: Extraocular movements intact  Right eye: Normal extraocular motion  Left eye: Normal extraocular motion  Pulmonary:      Effort: Pulmonary effort is normal  No respiratory distress  Abdominal:      General: Abdomen is flat  There is no distension  Musculoskeletal:         General: No swelling  Cervical back: Neck supple  No rigidity  Right lower leg: No edema  Left lower leg: No edema  Skin:     General: Skin is warm and dry  Coloration: Skin is not jaundiced or pale  Neurological:      General: No focal deficit present  GCS: GCS eye subscore is 4  GCS verbal subscore is 5  GCS motor subscore is 6           Vital Signs  ED Triage Vitals [04/08/22 2202]   Temperature Pulse Respirations Blood Pressure SpO2   98 °F (36 7 °C) 91 20 149/94 95 %      Temp Source Heart Rate Source Patient Position - Orthostatic VS BP Location FiO2 (%)   Tympanic Monitor Sitting Left arm --      Pain Score       No Pain           Vitals:    04/08/22 2202   BP: 149/94   Pulse: 91   Patient Position - Orthostatic VS: Sitting         Visual Acuity      ED Medications  Medications - No data to display    Diagnostic Studies  Results Reviewed     None                 No orders to display              Procedures  Procedures         ED Course                                             MDM  Number of Diagnoses or Management Options    25-year-old female presenting the to the ER with altered mental status with reported concerns for mast cell activation syndrome as a cause of her symptoms due to odors in the neighborhood from a sewage problem  No history of falls or trauma or stroke-like symptoms or acute pain or syncope or hypotension  Vital signs reviewed and are within normal limits  This is not anaphylaxis  There is no clinical evidence of stroke at this time  I have not observed any seizure-like activity  Her examination is nonfocal   She is awake and and able to answer questions provide history  She has normal vital signs    I previously saw this patient in the ER in the context of an extremely difficult ER encounter with the patient was quite demanding  Stating that she needs to be tested for" everything" including CT scans MRIs lab work workups for sepsis treatment for anaphylaxis  She previously presented with a care management packet which reported that she needs to be treated with anaphylaxis treatment medications in order to receive any diagnostic study whether it required IV contrast or not  Previous provider had gone as far to contact the specialist and spoke with a fellow on-call from her specialty hospital who advised her that there was no physiologic reason why she would need these medications for a noncontrast study  In that previous visit the patient was discharged after a medical screening exam was completed by myself  On this visit I did offer to treat her for her symptoms in accordance with her reported medication regimen  This time she did not present with her care management packet with specified treatments and was specifying that she needed Solu-Medrol, Ativan, Benadryl  I offered to provide these medications and they were ordered  The  thought this was inappropriate to treat her symptoms alone and was requesting further workup  I discussed with them that further workup was not warranted as she clinically was not having evidence of sepsis or seizures or anaphylaxis    The plan was to proceed with observation the ER for improvement with medication  The  became upset that we were not immediately pursuing transfer to [facility] in order to see a gastroenterologist Dr Boucher Fearing  Date GERD is satisfied and refused any medication or further treatment and requesting to be discharged immediately as soon as possible  I have lies the request and handed them discharge papers myself  Return precautions were instructed  It is my opinion based on this encounter and the previous encounters that this patient has an undiagnosed psychiatric illness and is manipulating the medical system in order to obtain medications which are not indicated at this time  As her previous visit was quite confrontational and was negatively perceived I did due to the  complexity of this patient and situation offered to treat her this time yet they refused treatment and wished to leave  She was discharged in stable condition under the care of her  with plan to follow up with her specialist or to proceed to [facility]     Disposition  Final diagnoses:   Encounter for medical screening examination   Altered mental status, unspecified altered mental status type     Time reflects when diagnosis was documented in both MDM as applicable and the Disposition within this note     Time User Action Codes Description Comment    4/8/2022 10:24 PM Lenoard  Add [R41 82] Altered mental status     4/8/2022 10:29 PM Lenoard  Remove [R41 82] Altered mental status     4/8/2022 10:29 PM Lenoard  Add [Z13 9] Encounter for medical screening examination     4/8/2022 10:29 PM Lenoard  Add [G93 40] Acute encephalopathy     4/8/2022 10:29 PM Lenoard  Remove [G93 40] Acute encephalopathy     4/8/2022 10:29 PM Lenoard  Add [R41 82] Altered mental status, unspecified altered mental status type       ED Disposition     ED Disposition Condition Date/Time Comment Discharge Stable Fri Apr 8, 2022 10:24 PM Nicola Hernadnez discharge to home/self care              Follow-up Information     Follow up With Specialties Details Why [facility] Emergency Department Emergency Medicine Go to  If symptoms worsen Elisabeth Mendoza [id number]  [facility] Emergency Department, [address], [address]          Discharge Medication List as of 4/8/2022 10:24 PM      CONTINUE these medications which have NOT CHANGED    Details   acetaminophen (TYLENOL) 500 mg tablet Take 1,000 mg by mouth every 6 (six) hours as needed for mild pain, Historical Med      budesonide (ENTOCORT EC) 3 MG capsule TAKE ONE CAPSULE BY MOUTH 3 TIMES A DAY BEFORE MEALS, Normal      butalbital-acetaminophen-caffeine (FIORICET,ESGIC) -40 mg per tablet Take 1 tablet by mouth every 8 (eight) hours as needed for migraine, Starting Mon 3/21/2022, Normal      cetirizine (ZyrTEC) 10 mg tablet Take 20 mg by mouth daily , Historical Med      cholecalciferol (VITAMIN D) 400 units/mL Take 2 5 mL (1,000 Units total) by mouth daily, Starting Mon 10/21/2019, Normal      cyanocobalamin 1,000 mcg/mL Starting Tue 1/5/2021, Historical Med      diclofenac (VOLTAREN) 75 mg EC tablet Take 1 tablet (75 mg total) by mouth 2 (two) times a day for 21 days, Starting Wed 1/20/2021, Until Mon 12/27/2021, Normal      diphenhydrAMINE (BENADRYL) 25 mg tablet Take 50 mg by mouth 2 (two) times a day , Historical Med      EPINEPHrine (EPIPEN 2-MARYSOL IJ) EpiPen   prn, Historical Med      !! ergocalciferol (VITAMIN D2) 50,000 units Starting Tue 1/5/2021, Historical Med      !! ergocalciferol (VITAMIN D2) 50,000 units   Start: 02/22/21 17:37:00 EST, See Instructions, 50,000 Int_Unit PO 1 time per week, Historical Med      famotidine (PEPCID) 40 MG tablet Take 1 tablet (40 mg total) by mouth daily, Starting u 12/9/2021, Normal hydrOXYzine HCL (ATARAX) 25 mg tablet Take 25 mg by mouth 4 (four) times a day , Starting Mon 11/16/2020, Historical Med      levalbuterol (XOPENEX HFA) 45 mcg/act inhaler Inhale 1-2 puffs every 4 (four) hours as needed for shortness of breath, Historical Med      !! levothyroxine 50 mcg tablet Take 50 mcg by mouth daily, Historical Med      linaGLIPtin 5 MG TABS Take 5 mg by mouth daily, Historical Med      liothyronine (CYTOMEL) 5 mcg tablet Take 5 mcg by mouth daily, Historical Med      LORazepam (ATIVAN) 1 mg tablet Take 1 tablet (1 mg total) by mouth every 8 (eight) hours as needed for anxiety, Starting Thu 2/4/2021, Normal      montelukast (SINGULAIR) 10 mg tablet Take 1 tablet (10 mg total) by mouth daily at bedtime, Starting Wed 10/28/2020, Normal      NON FORMULARY immunoglobin sq 6g 30 ml once a week, Historical Med      omalizumab (XOLAIR) 150 mg Inject 300 mg under the skin every 28 days , Historical Med      ondansetron (ZOFRAN-ODT) 4 mg disintegrating tablet Take 1 tablet (4 mg total) by mouth every 6 (six) hours as needed for nausea or vomiting, Starting Thu 12/9/2021, Normal      pantoprazole (PROTONIX) 40 mg tablet Take 1 tablet (40 mg total) by mouth 2 (two) times a day, Starting Mon 3/28/2022, Normal      predniSONE 20 mg tablet Taper as per physician instructions, Normal      Riboflavin (Vitamin B-2) 100 MG TABS Take 4 tablets (400 mg total) by mouth daily, Starting Wed 1/20/2021, Until Mon 3/21/2022, Normal      simethicone (MYLICON) 687 MG chewable tablet Chew 125 mg every 6 (six) hours as needed for flatulence, Historical Med      Stelara 90 MG/ML subcutaneous injection 90mg inject subcutaneously every 28 days, Normal      !! Synthroid 75 MCG tablet Starting Wed 3/16/2022, Historical Med      temazepam (RESTORIL) 30 mg capsule Take 1 capsule (30 mg total) by mouth daily at bedtime as needed for sleep, Starting Wed 12/29/2021, Normal       !! - Potential duplicate medications found   Please discuss with provider  No discharge procedures on file      PDMP Review       Value Time User    PDMP Reviewed  Yes 3/24/2022  9:42 AM Deion Swain DO          ED Provider  Electronically Signed by           Dirk Mathew DO  04/08/22 3004

## 2022-04-09 NOTE — ED NOTES
Patient acting as if she is in a coma state and then pops up and started singing to RN  RN asked what was wrong with her and she said she has some mast cell disease and is in a toxic state but asked for IV ativan and benadryl   came into the room demanding a ct scan and bloodwork done and also the same medications for her  He would completely talk over the ER physician and RN telling us what he wants done and how to do it  States he wants her transferred to [facility] better  Then he decided to just be discharged when he did not get his way    The patient ambulated to the wheelchair and her  wheeled her out     [facility]  04/08/22 1346

## 2022-04-09 NOTE — ED NOTES
Call light activated  Pt observed resting with eyes closed, resp easy nonlabored on RA  No reports of acute distress or pain,  insists taking pt home,  states," I want to leave, we're not receiving care that we need here, I'm bringing her to [facility] " Reassurance, Risk vs benefit with education provided, pt's  continue to insist leaving  ER Provider was made aware       Bebo Kelly RN  04/08/22 6807

## 2022-04-11 ENCOUNTER — TELEPHONE (OUTPATIENT)
Dept: GASTROENTEROLOGY | Facility: MEDICAL CENTER | Age: 47
End: 2022-04-11

## 2022-04-11 ENCOUNTER — HOSPITAL ENCOUNTER (OUTPATIENT)
Dept: INFUSION CENTER | Facility: CLINIC | Age: 47
Discharge: HOME | End: 2022-04-11
Payer: COMMERCIAL

## 2022-04-11 VITALS
TEMPERATURE: 97.5 F | SYSTOLIC BLOOD PRESSURE: 142 MMHG | DIASTOLIC BLOOD PRESSURE: 88 MMHG | HEART RATE: 94 BPM | RESPIRATION RATE: 18 BRPM

## 2022-04-11 DIAGNOSIS — R63.0 ANOREXIA: Primary | ICD-10-CM

## 2022-04-11 DIAGNOSIS — E44.1 MILD PROTEIN-CALORIE MALNUTRITION (HCC): ICD-10-CM

## 2022-04-11 DIAGNOSIS — R19.7 DIARRHEA, UNSPECIFIED TYPE: ICD-10-CM

## 2022-04-11 PROCEDURE — 96361 HYDRATE IV INFUSION ADD-ON: CPT

## 2022-04-11 PROCEDURE — 96375 TX/PRO/DX INJ NEW DRUG ADDON: CPT

## 2022-04-11 PROCEDURE — 96365 THER/PROPH/DIAG IV INF INIT: CPT

## 2022-04-11 RX ORDER — METHYLPREDNISOLONE SODIUM SUCCINATE 125 MG/2ML
60 INJECTION, POWDER, LYOPHILIZED, FOR SOLUTION INTRAMUSCULAR; INTRAVENOUS ONCE
Status: COMPLETED | OUTPATIENT
Start: 2022-04-11 | End: 2022-04-11

## 2022-04-11 RX ORDER — SODIUM CHLORIDE, SODIUM LACTATE, POTASSIUM CHLORIDE, CALCIUM CHLORIDE 600; 310; 30; 20 MG/100ML; MG/100ML; MG/100ML; MG/100ML
1500 INJECTION, SOLUTION INTRAVENOUS ONCE
Status: CANCELLED
Start: 2022-04-14 | End: 2022-04-13

## 2022-04-11 RX ORDER — SODIUM CHLORIDE, SODIUM LACTATE, POTASSIUM CHLORIDE, CALCIUM CHLORIDE 600; 310; 30; 20 MG/100ML; MG/100ML; MG/100ML; MG/100ML
1500 INJECTION, SOLUTION INTRAVENOUS ONCE
Status: COMPLETED | OUTPATIENT
Start: 2022-04-11 | End: 2022-04-11

## 2022-04-11 RX ORDER — METHYLPREDNISOLONE SODIUM SUCCINATE 125 MG/2ML
60 INJECTION, POWDER, LYOPHILIZED, FOR SOLUTION INTRAMUSCULAR; INTRAVENOUS ONCE
Status: CANCELLED
Start: 2022-04-13 | End: 2022-04-13

## 2022-04-11 RX ADMIN — SODIUM CHLORIDE, SODIUM LACTATE, POTASSIUM CHLORIDE, AND CALCIUM CHLORIDE 1500 ML: .6; .31; .03; .02 INJECTION, SOLUTION INTRAVENOUS at 12:18

## 2022-04-11 RX ADMIN — DIPHENHYDRAMINE HYDROCHLORIDE 50 MG: 50 INJECTION, SOLUTION INTRAMUSCULAR; INTRAVENOUS at 11:52

## 2022-04-11 RX ADMIN — METHYLPREDNISOLONE SODIUM SUCCINATE 60 MG: 125 INJECTION, POWDER, FOR SOLUTION INTRAMUSCULAR; INTRAVENOUS at 11:45

## 2022-04-11 NOTE — TELEPHONE ENCOUNTER
Pt called and is looking to be seen by you within a week due to her hospitalization over the weekend  I informed her that you are in the lab and then on vacation but she is adamant about seeing you  Is there any specific day that would work for a virtual with her? Or is there someone else specifically I should try and schedule her with?  Thank you

## 2022-04-11 NOTE — TELEPHONE ENCOUNTER
Patient said that a virtual visit this Thursday is fine  She said you can go ahead and schedule her any time and she will get the notification via [address]

## 2022-04-11 NOTE — PROGRESS NOTES
Patient arrived to unit without complaint  Patient received 1L of LR without incident and refused the remaining 500 mL  Patient states that she had been in [facility] over the weekend and came to this appointment already with some hydration and felt like 1L was enough for today  AVS declined and patient aware of next appointment  Patient left in stable condition

## 2022-04-12 ENCOUNTER — TELEPHONE (OUTPATIENT)
Dept: OTHER | Facility: OTHER | Age: 47
End: 2022-04-12

## 2022-04-12 NOTE — TELEPHONE ENCOUNTER
I returned call [place] spoke with Gagandeep Ignacio who explained there was another request for Q3 weeks , which has been denied due to quanity limits and FDA not approved dosing  I called back [place] I spoke with Swati Conley , who advised yes her current auth for Stelara 90 mg 1 per 28days has   They do not do verbal auths     I called utilization Management   [phone]    Approval letter in chart from 3-31-21 is for q8 weeks dosing    I was advised there was a peer to peer completed and that is how/when q4 weeks was over turned and Approved    I can not find any Approval letter on Perlita Burleson with [facility]     I was advised Renewal auth for q28 days is DENIED    A peer to peer needs to be done     I provided cell number to Dr Hilda Guzman  Case # [id number]        DR Levine ,     Please be aware  will be calling you on renewal for Q4 weeks Stelara 90mg

## 2022-04-13 ENCOUNTER — TELEPHONE (OUTPATIENT)
Dept: GASTROENTEROLOGY | Facility: CLINIC | Age: 47
End: 2022-04-13

## 2022-04-13 NOTE — TELEPHONE ENCOUNTER
Hi,    Spoke with [employer] and Stelara every 28 days approved for 1 year  He said he will let the pharmacy know    Can you let the patient know so she can get her medication? Thank you!

## 2022-04-13 NOTE — TELEPHONE ENCOUNTER
I called Eliezer Davila , gave update on Approval for Stelara 90 mg Q 4 WEEKS, Patient Thankful and will call now to order

## 2022-04-14 ENCOUNTER — HOSPITAL ENCOUNTER (OUTPATIENT)
Dept: INFUSION CENTER | Facility: CLINIC | Age: 47
Discharge: HOME | End: 2022-04-14
Payer: COMMERCIAL

## 2022-04-14 VITALS
HEART RATE: 99 BPM | TEMPERATURE: 99.2 F | RESPIRATION RATE: 18 BRPM | DIASTOLIC BLOOD PRESSURE: 91 MMHG | SYSTOLIC BLOOD PRESSURE: 123 MMHG

## 2022-04-14 DIAGNOSIS — R19.7 DIARRHEA, UNSPECIFIED TYPE: ICD-10-CM

## 2022-04-14 DIAGNOSIS — R63.0 ANOREXIA: Primary | ICD-10-CM

## 2022-04-14 DIAGNOSIS — E44.1 MILD PROTEIN-CALORIE MALNUTRITION (HCC): ICD-10-CM

## 2022-04-14 PROCEDURE — 96361 HYDRATE IV INFUSION ADD-ON: CPT

## 2022-04-14 PROCEDURE — 96360 HYDRATION IV INFUSION INIT: CPT

## 2022-04-14 RX ORDER — SODIUM CHLORIDE, SODIUM LACTATE, POTASSIUM CHLORIDE, CALCIUM CHLORIDE 600; 310; 30; 20 MG/100ML; MG/100ML; MG/100ML; MG/100ML
1500 INJECTION, SOLUTION INTRAVENOUS ONCE
Status: CANCELLED
Start: 2022-04-17 | End: 2022-04-17

## 2022-04-14 RX ORDER — SODIUM CHLORIDE, SODIUM LACTATE, POTASSIUM CHLORIDE, CALCIUM CHLORIDE 600; 310; 30; 20 MG/100ML; MG/100ML; MG/100ML; MG/100ML
1500 INJECTION, SOLUTION INTRAVENOUS ONCE
Status: COMPLETED | OUTPATIENT
Start: 2022-04-14 | End: 2022-04-14

## 2022-04-14 RX ORDER — METHYLPREDNISOLONE SODIUM SUCCINATE 125 MG/2ML
60 INJECTION, POWDER, LYOPHILIZED, FOR SOLUTION INTRAMUSCULAR; INTRAVENOUS ONCE
Status: CANCELLED
Start: 2022-04-19 | End: 2022-04-17

## 2022-04-14 RX ADMIN — SODIUM CHLORIDE, SODIUM LACTATE, POTASSIUM CHLORIDE, AND CALCIUM CHLORIDE 1500 ML: .6; .31; .03; .02 INJECTION, SOLUTION INTRAVENOUS at 12:16

## 2022-04-14 NOTE — PROGRESS NOTES
Patient arrived to unit without complaint  Patient received 1L of LR without incident and refused the remaining 500 mL  Patient states that she  felt like 1L was enough for today, she will discuss with her MD at virtual visit this afternoon  AVS declined and patient aware of next appointment  Patient left in stable condition

## 2022-04-19 ENCOUNTER — HOSPITAL ENCOUNTER (OUTPATIENT)
Dept: INFUSION CENTER | Facility: CLINIC | Age: 47
End: 2022-04-19

## 2022-04-21 ENCOUNTER — HOSPITAL ENCOUNTER (OUTPATIENT)
Dept: INFUSION CENTER | Facility: CLINIC | Age: 47
Discharge: HOME | End: 2022-04-21
Payer: COMMERCIAL

## 2022-04-21 VITALS
DIASTOLIC BLOOD PRESSURE: 96 MMHG | TEMPERATURE: 97.4 F | SYSTOLIC BLOOD PRESSURE: 134 MMHG | HEART RATE: 97 BPM | RESPIRATION RATE: 18 BRPM

## 2022-04-21 DIAGNOSIS — E44.1 MILD PROTEIN-CALORIE MALNUTRITION (HCC): ICD-10-CM

## 2022-04-21 DIAGNOSIS — R19.7 DIARRHEA, UNSPECIFIED TYPE: ICD-10-CM

## 2022-04-21 DIAGNOSIS — R63.0 ANOREXIA: Primary | ICD-10-CM

## 2022-04-21 PROCEDURE — 96361 HYDRATE IV INFUSION ADD-ON: CPT

## 2022-04-21 PROCEDURE — 96365 THER/PROPH/DIAG IV INF INIT: CPT

## 2022-04-21 PROCEDURE — 96375 TX/PRO/DX INJ NEW DRUG ADDON: CPT

## 2022-04-21 RX ORDER — METHYLPREDNISOLONE SODIUM SUCCINATE 125 MG/2ML
60 INJECTION, POWDER, LYOPHILIZED, FOR SOLUTION INTRAMUSCULAR; INTRAVENOUS ONCE
Status: COMPLETED | OUTPATIENT
Start: 2022-04-21 | End: 2022-04-21

## 2022-04-21 RX ORDER — SODIUM CHLORIDE, SODIUM LACTATE, POTASSIUM CHLORIDE, CALCIUM CHLORIDE 600; 310; 30; 20 MG/100ML; MG/100ML; MG/100ML; MG/100ML
1500 INJECTION, SOLUTION INTRAVENOUS ONCE
Status: CANCELLED
Start: 2022-04-23 | End: 2022-04-23

## 2022-04-21 RX ORDER — SODIUM CHLORIDE, SODIUM LACTATE, POTASSIUM CHLORIDE, CALCIUM CHLORIDE 600; 310; 30; 20 MG/100ML; MG/100ML; MG/100ML; MG/100ML
1500 INJECTION, SOLUTION INTRAVENOUS ONCE
Status: COMPLETED | OUTPATIENT
Start: 2022-04-21 | End: 2022-04-21

## 2022-04-21 RX ORDER — METHYLPREDNISOLONE SODIUM SUCCINATE 125 MG/2ML
60 INJECTION, POWDER, LYOPHILIZED, FOR SOLUTION INTRAMUSCULAR; INTRAVENOUS ONCE
Status: CANCELLED
Start: 2022-04-23 | End: 2022-04-23

## 2022-04-21 RX ADMIN — DIPHENHYDRAMINE HYDROCHLORIDE 50 MG: 50 INJECTION, SOLUTION INTRAMUSCULAR; INTRAVENOUS at 12:02

## 2022-04-21 RX ADMIN — METHYLPREDNISOLONE SODIUM SUCCINATE 60 MG: 125 INJECTION, POWDER, FOR SOLUTION INTRAMUSCULAR; INTRAVENOUS at 11:54

## 2022-04-21 RX ADMIN — SODIUM CHLORIDE, SODIUM LACTATE, POTASSIUM CHLORIDE, AND CALCIUM CHLORIDE 1500 ML: .6; .31; .03; .02 INJECTION, SOLUTION INTRAVENOUS at 11:50

## 2022-04-21 NOTE — PROGRESS NOTES
Pt presents for LR infusion  No new meds or concerns  Infusion tolerated without adverse reaction  Future appointments discussed  AVS declined

## 2022-04-22 ENCOUNTER — OFFICE VISIT (OUTPATIENT)
Dept: FAMILY MEDICINE CLINIC | Facility: CLINIC | Age: 47
End: 2022-04-22
Payer: COMMERCIAL

## 2022-04-22 VITALS
SYSTOLIC BLOOD PRESSURE: 128 MMHG | BODY MASS INDEX: 25.66 KG/M2 | RESPIRATION RATE: 16 BRPM | TEMPERATURE: 98.9 F | DIASTOLIC BLOOD PRESSURE: 70 MMHG | HEART RATE: 91 BPM | OXYGEN SATURATION: 99 % | WEIGHT: 154 LBS | HEIGHT: 65 IN

## 2022-04-22 DIAGNOSIS — D89.40 MAST CELL ACTIVATION SYNDROME (HCC): Primary | ICD-10-CM

## 2022-04-22 DIAGNOSIS — G43.909 MIGRAINE WITHOUT STATUS MIGRAINOSUS, NOT INTRACTABLE, UNSPECIFIED MIGRAINE TYPE: ICD-10-CM

## 2022-04-22 DIAGNOSIS — R56.9 SEIZURE-LIKE ACTIVITY (HCC): ICD-10-CM

## 2022-04-22 PROCEDURE — 3008F BODY MASS INDEX DOCD: CPT | Performed by: INTERNAL MEDICINE

## 2022-04-22 PROCEDURE — 99496 TRANSJ CARE MGMT HIGH F2F 7D: CPT | Performed by: FAMILY MEDICINE

## 2022-04-22 RX ORDER — FAMOTIDINE 40 MG/5ML
20 POWDER, FOR SUSPENSION ORAL 2 TIMES DAILY
Qty: 150 ML | Refills: 5 | Status: SHIPPED | OUTPATIENT
Start: 2022-04-22 | End: 2022-06-02 | Stop reason: HOSPADM
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
## 2022-04-25 ENCOUNTER — HOSPITAL ENCOUNTER (OUTPATIENT)
Dept: INFUSION CENTER | Facility: CLINIC | Age: 47
Discharge: HOME | End: 2022-04-25
Payer: COMMERCIAL

## 2022-04-25 VITALS
TEMPERATURE: 98.2 F | DIASTOLIC BLOOD PRESSURE: 80 MMHG | HEART RATE: 92 BPM | RESPIRATION RATE: 18 BRPM | SYSTOLIC BLOOD PRESSURE: 131 MMHG

## 2022-04-25 DIAGNOSIS — R19.7 DIARRHEA, UNSPECIFIED TYPE: ICD-10-CM

## 2022-04-25 DIAGNOSIS — R63.0 ANOREXIA: Primary | ICD-10-CM

## 2022-04-25 DIAGNOSIS — E44.1 MILD PROTEIN-CALORIE MALNUTRITION (HCC): ICD-10-CM

## 2022-04-25 PROCEDURE — 96361 HYDRATE IV INFUSION ADD-ON: CPT

## 2022-04-25 PROCEDURE — 96375 TX/PRO/DX INJ NEW DRUG ADDON: CPT

## 2022-04-25 PROCEDURE — 96365 THER/PROPH/DIAG IV INF INIT: CPT

## 2022-04-25 RX ORDER — METHYLPREDNISOLONE SODIUM SUCCINATE 125 MG/2ML
60 INJECTION, POWDER, LYOPHILIZED, FOR SOLUTION INTRAMUSCULAR; INTRAVENOUS ONCE
Status: COMPLETED | OUTPATIENT
Start: 2022-04-25 | End: 2022-04-25

## 2022-04-25 RX ORDER — SODIUM CHLORIDE, SODIUM LACTATE, POTASSIUM CHLORIDE, CALCIUM CHLORIDE 600; 310; 30; 20 MG/100ML; MG/100ML; MG/100ML; MG/100ML
1500 INJECTION, SOLUTION INTRAVENOUS ONCE
Status: COMPLETED | OUTPATIENT
Start: 2022-04-25 | End: 2022-04-25

## 2022-04-25 RX ORDER — METHYLPREDNISOLONE SODIUM SUCCINATE 125 MG/2ML
60 INJECTION, POWDER, LYOPHILIZED, FOR SOLUTION INTRAMUSCULAR; INTRAVENOUS ONCE
Status: CANCELLED
Start: 2022-04-27 | End: 2022-04-27

## 2022-04-25 RX ORDER — SODIUM CHLORIDE, SODIUM LACTATE, POTASSIUM CHLORIDE, CALCIUM CHLORIDE 600; 310; 30; 20 MG/100ML; MG/100ML; MG/100ML; MG/100ML
1500 INJECTION, SOLUTION INTRAVENOUS ONCE
Status: CANCELLED
Start: 2022-04-28 | End: 2022-04-27

## 2022-04-25 RX ADMIN — SODIUM CHLORIDE, SODIUM LACTATE, POTASSIUM CHLORIDE, AND CALCIUM CHLORIDE 1500 ML: .6; .31; .03; .02 INJECTION, SOLUTION INTRAVENOUS at 12:30

## 2022-04-25 RX ADMIN — DIPHENHYDRAMINE HYDROCHLORIDE 50 MG: 50 INJECTION, SOLUTION INTRAMUSCULAR; INTRAVENOUS at 12:34

## 2022-04-25 RX ADMIN — METHYLPREDNISOLONE SODIUM SUCCINATE 60 MG: 125 INJECTION, POWDER, FOR SOLUTION INTRAMUSCULAR; INTRAVENOUS at 12:33

## 2022-04-27 ENCOUNTER — TELEPHONE (OUTPATIENT)
Dept: GASTROENTEROLOGY | Facility: CLINIC | Age: 47
End: 2022-04-27

## 2022-04-27 ENCOUNTER — TRANSCRIBE ORDERS (OUTPATIENT)
Dept: PAIN MEDICINE | Facility: CLINIC | Age: 47
End: 2022-04-27

## 2022-04-27 ENCOUNTER — TELEPHONE (OUTPATIENT)
Dept: GASTROENTEROLOGY | Facility: MEDICAL CENTER | Age: 47
End: 2022-04-27

## 2022-04-27 DIAGNOSIS — K50.119 CROHN'S DISEASE OF COLON WITH COMPLICATION (HCC): ICD-10-CM

## 2022-04-27 RX ORDER — USTEKINUMAB 90 MG/ML
INJECTION, SOLUTION SUBCUTANEOUS
Qty: 1 ML | Refills: 6 | Status: SHIPPED | OUTPATIENT
Start: 2022-04-27

## 2022-04-27 NOTE — TELEPHONE ENCOUNTER
Dr Mcleod Cough: Could you order the Stelara q 21 days? Thank you  I will message the patient back on [employer]

## 2022-04-27 NOTE — TELEPHONE ENCOUNTER
----- Message from Hemalatha Weiner sent at 4/27/2022  8:39 AM EDT -----  Regarding: FW: Stelara Insurance Approval Letter 1 per 21 - 90mg/ml    ----- Message -----  From: Tomás Board  Sent: 4/26/2022   7:31 PM EDT  To: [facility]  Subject: Stelara Insurance Approval Letter 1 per 21 -#    Hi Dr Efraín Black,  Just trying to get caught up on bills and paperwork and opened this letter tonight from [facility]  Am I reading this right that they approved the 21 day dosage frequency? Please see attached    Solange Thomas

## 2022-04-27 NOTE — TELEPHONE ENCOUNTER
Called insurance at [phone] and spoke with [employer]  He confirmed authorization is for Stelara 90mg every 21 days  Valid 2/13/2022 through 4/12/2023  Auth#: Teddy Ferrer [id number]      He will be faxing me a copy of the approval letter as well

## 2022-04-27 NOTE — TELEPHONE ENCOUNTER
Could you please call the insurance company and confirm which dose is approved Stelara q 21 days vs q 28 days  Thank you!

## 2022-04-27 NOTE — TELEPHONE ENCOUNTER
Hi,    I ordered it and [facility] to [facility] (since this was in the system)  Let me know if you need me to send it to a different pharmacy or print it out  Thank you!

## 2022-04-27 NOTE — TELEPHONE ENCOUNTER
We received mychart message from Jessica Yi    She has received a letter from her [facility] Stelara 90mg Q21 days      I called and spoke with Jessica Yi  Patient states she will call [facility] and order and will let us know if anything further is needed        We had previously sent script for stelar q21 days, Jessica Yi stated she did speak with pharmacy and they had script on file

## 2022-04-27 NOTE — TELEPHONE ENCOUNTER
Hi,    She uploaded a screen shot of a letter that seems to imply her Ivania Gibson was approved for every 21 day dosing  Can you confim if this is correct? Thank you!

## 2022-04-28 ENCOUNTER — HOSPITAL ENCOUNTER (OUTPATIENT)
Dept: INFUSION CENTER | Facility: CLINIC | Age: 47
Discharge: HOME | End: 2022-04-28
Payer: COMMERCIAL

## 2022-04-28 DIAGNOSIS — E44.1 MILD PROTEIN-CALORIE MALNUTRITION (HCC): ICD-10-CM

## 2022-04-28 DIAGNOSIS — R63.0 ANOREXIA: Primary | ICD-10-CM

## 2022-04-28 DIAGNOSIS — R19.7 DIARRHEA, UNSPECIFIED TYPE: ICD-10-CM

## 2022-04-28 PROCEDURE — 96360 HYDRATION IV INFUSION INIT: CPT

## 2022-04-28 PROCEDURE — 96361 HYDRATE IV INFUSION ADD-ON: CPT

## 2022-04-28 RX ORDER — METHYLPREDNISOLONE SODIUM SUCCINATE 125 MG/2ML
60 INJECTION, POWDER, LYOPHILIZED, FOR SOLUTION INTRAMUSCULAR; INTRAVENOUS ONCE
Status: CANCELLED
Start: 2022-05-02 | End: 2022-05-01

## 2022-04-28 RX ORDER — SODIUM CHLORIDE, SODIUM LACTATE, POTASSIUM CHLORIDE, CALCIUM CHLORIDE 600; 310; 30; 20 MG/100ML; MG/100ML; MG/100ML; MG/100ML
1500 INJECTION, SOLUTION INTRAVENOUS ONCE
Status: CANCELLED
Start: 2022-05-02 | End: 2022-05-01

## 2022-04-28 RX ORDER — SODIUM CHLORIDE, SODIUM LACTATE, POTASSIUM CHLORIDE, CALCIUM CHLORIDE 600; 310; 30; 20 MG/100ML; MG/100ML; MG/100ML; MG/100ML
1500 INJECTION, SOLUTION INTRAVENOUS ONCE
Status: COMPLETED | OUTPATIENT
Start: 2022-04-28 | End: 2022-04-28

## 2022-04-28 RX ADMIN — SODIUM CHLORIDE, SODIUM LACTATE, POTASSIUM CHLORIDE, AND CALCIUM CHLORIDE 1500 ML: .6; .31; .03; .02 INJECTION, SOLUTION INTRAVENOUS at 11:32

## 2022-05-02 ENCOUNTER — TELEPHONE (OUTPATIENT)
Dept: GASTROENTEROLOGY | Facility: CLINIC | Age: 47
End: 2022-05-02

## 2022-05-02 ENCOUNTER — TELEPHONE (OUTPATIENT)
Dept: GASTROENTEROLOGY | Facility: MEDICAL CENTER | Age: 47
End: 2022-05-02

## 2022-05-02 ENCOUNTER — HOSPITAL ENCOUNTER (OUTPATIENT)
Dept: INFUSION CENTER | Facility: CLINIC | Age: 47
Discharge: HOME | End: 2022-05-02
Payer: COMMERCIAL

## 2022-05-02 VITALS
DIASTOLIC BLOOD PRESSURE: 89 MMHG | TEMPERATURE: 98.4 F | RESPIRATION RATE: 18 BRPM | HEART RATE: 96 BPM | SYSTOLIC BLOOD PRESSURE: 116 MMHG

## 2022-05-02 DIAGNOSIS — R19.7 DIARRHEA, UNSPECIFIED TYPE: ICD-10-CM

## 2022-05-02 DIAGNOSIS — E44.1 MILD PROTEIN-CALORIE MALNUTRITION (HCC): ICD-10-CM

## 2022-05-02 DIAGNOSIS — R63.0 ANOREXIA: Primary | ICD-10-CM

## 2022-05-02 PROCEDURE — 96361 HYDRATE IV INFUSION ADD-ON: CPT

## 2022-05-02 PROCEDURE — 96375 TX/PRO/DX INJ NEW DRUG ADDON: CPT

## 2022-05-02 PROCEDURE — 96365 THER/PROPH/DIAG IV INF INIT: CPT

## 2022-05-02 RX ORDER — METHYLPREDNISOLONE SODIUM SUCCINATE 125 MG/2ML
60 INJECTION, POWDER, LYOPHILIZED, FOR SOLUTION INTRAMUSCULAR; INTRAVENOUS ONCE
Status: COMPLETED | OUTPATIENT
Start: 2022-05-02 | End: 2022-05-02

## 2022-05-02 RX ORDER — SODIUM CHLORIDE, SODIUM LACTATE, POTASSIUM CHLORIDE, CALCIUM CHLORIDE 600; 310; 30; 20 MG/100ML; MG/100ML; MG/100ML; MG/100ML
1500 INJECTION, SOLUTION INTRAVENOUS ONCE
Status: COMPLETED | OUTPATIENT
Start: 2022-05-02 | End: 2022-05-02

## 2022-05-02 RX ORDER — SODIUM CHLORIDE, SODIUM LACTATE, POTASSIUM CHLORIDE, CALCIUM CHLORIDE 600; 310; 30; 20 MG/100ML; MG/100ML; MG/100ML; MG/100ML
1500 INJECTION, SOLUTION INTRAVENOUS ONCE
Status: CANCELLED
Start: 2022-05-05 | End: 2022-05-04

## 2022-05-02 RX ORDER — METHYLPREDNISOLONE SODIUM SUCCINATE 125 MG/2ML
60 INJECTION, POWDER, LYOPHILIZED, FOR SOLUTION INTRAMUSCULAR; INTRAVENOUS ONCE
Status: CANCELLED
Start: 2022-05-04 | End: 2022-05-04

## 2022-05-02 RX ADMIN — DIPHENHYDRAMINE HYDROCHLORIDE 50 MG: 50 INJECTION, SOLUTION INTRAMUSCULAR; INTRAVENOUS at 11:48

## 2022-05-02 RX ADMIN — METHYLPREDNISOLONE SODIUM SUCCINATE 60 MG: 125 INJECTION, POWDER, FOR SOLUTION INTRAMUSCULAR; INTRAVENOUS at 11:45

## 2022-05-02 RX ADMIN — SODIUM CHLORIDE, SODIUM LACTATE, POTASSIUM CHLORIDE, AND CALCIUM CHLORIDE 1500 ML: .6; .31; .03; .02 INJECTION, SOLUTION INTRAVENOUS at 12:25

## 2022-05-02 NOTE — TELEPHONE ENCOUNTER
Pt called and states that the pharmacy needs approval from Dr Zbigniew Luna before they fill her stelara order

## 2022-05-02 NOTE — TELEPHONE ENCOUNTER
Gianni Myers 1 hour ago (12:32 PM)     MM       Pt called and states that the pharmacy needs approval from Dr Dipak Cote before they fill her stelara order

## 2022-05-03 NOTE — TELEPHONE ENCOUNTER
I called [facility]  spoke with Claribel Henning I was transferred T [facility] the pharmacist    They can not run Stelara 90 mg Q21 days until 5-7-22     I advised we will call back on 5-7-22 to ensure they receive pd claim for Q 21 day dosing        [facility] was needing clarification on separate med ordered by Nydia Weber office I advised they would have to reach out to that office

## 2022-05-03 NOTE — TELEPHONE ENCOUNTER
I called [facility] for update  patient upset , she is scheduled for appt 5-9-22     I advised pharmacy is advising it is too soon they can run on 5-7-22 and patient would received by 5-10-22 (delaying treatment) patient does not want to change her 5-9-22 appt        Patient is going to contact pharmacy herself    I advised pt to return our call if we can be of any help

## 2022-05-05 ENCOUNTER — ANNUAL EXAM (OUTPATIENT)
Dept: GYNECOLOGY | Facility: CLINIC | Age: 47
End: 2022-05-05
Payer: COMMERCIAL

## 2022-05-05 ENCOUNTER — HOSPITAL ENCOUNTER (OUTPATIENT)
Dept: INFUSION CENTER | Facility: CLINIC | Age: 47
Discharge: HOME | End: 2022-05-05
Payer: COMMERCIAL

## 2022-05-05 VITALS
TEMPERATURE: 97.4 F | HEART RATE: 98 BPM | SYSTOLIC BLOOD PRESSURE: 111 MMHG | DIASTOLIC BLOOD PRESSURE: 82 MMHG | RESPIRATION RATE: 18 BRPM

## 2022-05-05 VITALS — DIASTOLIC BLOOD PRESSURE: 80 MMHG | SYSTOLIC BLOOD PRESSURE: 114 MMHG | BODY MASS INDEX: 26.32 KG/M2 | WEIGHT: 159.4 LBS

## 2022-05-05 DIAGNOSIS — R10.2 PELVIC PAIN: ICD-10-CM

## 2022-05-05 DIAGNOSIS — N95.1 VASOMOTOR SYMPTOMS DUE TO MENOPAUSE: ICD-10-CM

## 2022-05-05 DIAGNOSIS — E44.1 MILD PROTEIN-CALORIE MALNUTRITION (HCC): ICD-10-CM

## 2022-05-05 DIAGNOSIS — R19.7 DIARRHEA, UNSPECIFIED TYPE: ICD-10-CM

## 2022-05-05 DIAGNOSIS — N64.4 BREAST PAIN: ICD-10-CM

## 2022-05-05 DIAGNOSIS — Z01.411 ENCOUNTER FOR GYNECOLOGICAL EXAMINATION (GENERAL) (ROUTINE) WITH ABNORMAL FINDINGS: Primary | ICD-10-CM

## 2022-05-05 DIAGNOSIS — R63.0 ANOREXIA: Primary | ICD-10-CM

## 2022-05-05 PROCEDURE — 96361 HYDRATE IV INFUSION ADD-ON: CPT

## 2022-05-05 PROCEDURE — 99396 PREV VISIT EST AGE 40-64: CPT | Performed by: OBSTETRICS & GYNECOLOGY

## 2022-05-05 PROCEDURE — 1036F TOBACCO NON-USER: CPT | Performed by: OBSTETRICS & GYNECOLOGY

## 2022-05-05 PROCEDURE — 96360 HYDRATION IV INFUSION INIT: CPT

## 2022-05-05 RX ORDER — METHYLPREDNISOLONE SODIUM SUCCINATE 125 MG/2ML
60 INJECTION, POWDER, LYOPHILIZED, FOR SOLUTION INTRAMUSCULAR; INTRAVENOUS ONCE
Status: CANCELLED
Start: 2022-05-09 | End: 2022-05-08

## 2022-05-05 RX ORDER — SODIUM CHLORIDE, SODIUM LACTATE, POTASSIUM CHLORIDE, CALCIUM CHLORIDE 600; 310; 30; 20 MG/100ML; MG/100ML; MG/100ML; MG/100ML
1500 INJECTION, SOLUTION INTRAVENOUS ONCE
Status: COMPLETED | OUTPATIENT
Start: 2022-05-05 | End: 2022-05-05

## 2022-05-05 RX ORDER — SODIUM CHLORIDE, SODIUM LACTATE, POTASSIUM CHLORIDE, CALCIUM CHLORIDE 600; 310; 30; 20 MG/100ML; MG/100ML; MG/100ML; MG/100ML
1500 INJECTION, SOLUTION INTRAVENOUS ONCE
Status: CANCELLED
Start: 2022-05-08 | End: 2022-05-08

## 2022-05-05 RX ADMIN — SODIUM CHLORIDE, SODIUM LACTATE, POTASSIUM CHLORIDE, AND CALCIUM CHLORIDE 1500 ML: .6; .31; .03; .02 INJECTION, SOLUTION INTRAVENOUS at 10:25
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
## 2022-05-09 ENCOUNTER — HOSPITAL ENCOUNTER (OUTPATIENT)
Dept: INFUSION CENTER | Facility: CLINIC | Age: 47
End: 2022-05-09

## 2022-05-10 ENCOUNTER — VBI (OUTPATIENT)
Dept: ADMINISTRATIVE | Facility: OTHER | Age: 47
End: 2022-05-10

## 2022-05-12 ENCOUNTER — HOSPITAL ENCOUNTER (OUTPATIENT)
Dept: INFUSION CENTER | Facility: CLINIC | Age: 47
End: 2022-05-12

## 2022-05-16 ENCOUNTER — HOSPITAL ENCOUNTER (OUTPATIENT)
Dept: INFUSION CENTER | Facility: CLINIC | Age: 47
Discharge: HOME | End: 2022-05-16
Payer: COMMERCIAL

## 2022-05-16 VITALS
TEMPERATURE: 98.2 F | SYSTOLIC BLOOD PRESSURE: 110 MMHG | HEART RATE: 83 BPM | DIASTOLIC BLOOD PRESSURE: 76 MMHG | RESPIRATION RATE: 18 BRPM

## 2022-05-16 DIAGNOSIS — R63.0 ANOREXIA: Primary | ICD-10-CM

## 2022-05-16 DIAGNOSIS — R19.7 DIARRHEA, UNSPECIFIED TYPE: ICD-10-CM

## 2022-05-16 DIAGNOSIS — E44.1 MILD PROTEIN-CALORIE MALNUTRITION (HCC): ICD-10-CM

## 2022-05-16 PROCEDURE — 96361 HYDRATE IV INFUSION ADD-ON: CPT

## 2022-05-16 PROCEDURE — 96365 THER/PROPH/DIAG IV INF INIT: CPT

## 2022-05-16 PROCEDURE — 96375 TX/PRO/DX INJ NEW DRUG ADDON: CPT

## 2022-05-16 RX ORDER — METHYLPREDNISOLONE SODIUM SUCCINATE 125 MG/2ML
60 INJECTION, POWDER, LYOPHILIZED, FOR SOLUTION INTRAMUSCULAR; INTRAVENOUS ONCE
Status: COMPLETED | OUTPATIENT
Start: 2022-05-16 | End: 2022-05-16

## 2022-05-16 RX ORDER — METHYLPREDNISOLONE SODIUM SUCCINATE 125 MG/2ML
60 INJECTION, POWDER, LYOPHILIZED, FOR SOLUTION INTRAMUSCULAR; INTRAVENOUS ONCE
Status: CANCELLED
Start: 2022-05-17 | End: 2022-05-17

## 2022-05-16 RX ORDER — SODIUM CHLORIDE, SODIUM LACTATE, POTASSIUM CHLORIDE, CALCIUM CHLORIDE 600; 310; 30; 20 MG/100ML; MG/100ML; MG/100ML; MG/100ML
1500 INJECTION, SOLUTION INTRAVENOUS ONCE
Status: CANCELLED
Start: 2022-05-19 | End: 2022-05-17

## 2022-05-16 RX ORDER — SODIUM CHLORIDE, SODIUM LACTATE, POTASSIUM CHLORIDE, CALCIUM CHLORIDE 600; 310; 30; 20 MG/100ML; MG/100ML; MG/100ML; MG/100ML
1500 INJECTION, SOLUTION INTRAVENOUS ONCE
Status: COMPLETED | OUTPATIENT
Start: 2022-05-16 | End: 2022-05-16

## 2022-05-16 RX ADMIN — METHYLPREDNISOLONE SODIUM SUCCINATE 60 MG: 125 INJECTION, POWDER, FOR SOLUTION INTRAMUSCULAR; INTRAVENOUS at 11:33

## 2022-05-16 RX ADMIN — SODIUM CHLORIDE, SODIUM LACTATE, POTASSIUM CHLORIDE, AND CALCIUM CHLORIDE 1500 ML: .6; .31; .03; .02 INJECTION, SOLUTION INTRAVENOUS at 11:24

## 2022-05-16 RX ADMIN — DIPHENHYDRAMINE HYDROCHLORIDE 50 MG: 50 INJECTION, SOLUTION INTRAMUSCULAR; INTRAVENOUS at 11:41

## 2022-05-16 NOTE — PROGRESS NOTES
Patient tolerated treatment without complication  AVS declined, aware of next appointment, patient left unit in stable condition

## 2022-05-19 ENCOUNTER — HOSPITAL ENCOUNTER (OUTPATIENT)
Dept: INFUSION CENTER | Facility: CLINIC | Age: 47
Discharge: HOME | End: 2022-05-19
Payer: COMMERCIAL

## 2022-05-19 VITALS
TEMPERATURE: 98 F | RESPIRATION RATE: 18 BRPM | HEART RATE: 83 BPM | DIASTOLIC BLOOD PRESSURE: 78 MMHG | SYSTOLIC BLOOD PRESSURE: 114 MMHG

## 2022-05-19 DIAGNOSIS — R63.0 ANOREXIA: Primary | ICD-10-CM

## 2022-05-19 DIAGNOSIS — R19.7 DIARRHEA, UNSPECIFIED TYPE: ICD-10-CM

## 2022-05-19 DIAGNOSIS — E44.1 MILD PROTEIN-CALORIE MALNUTRITION (HCC): ICD-10-CM

## 2022-05-19 PROCEDURE — 96361 HYDRATE IV INFUSION ADD-ON: CPT

## 2022-05-19 PROCEDURE — 96360 HYDRATION IV INFUSION INIT: CPT

## 2022-05-19 RX ORDER — METHYLPREDNISOLONE SODIUM SUCCINATE 125 MG/2ML
60 INJECTION, POWDER, LYOPHILIZED, FOR SOLUTION INTRAMUSCULAR; INTRAVENOUS ONCE
Status: CANCELLED
Start: 2022-05-24 | End: 2022-05-22

## 2022-05-19 RX ORDER — SODIUM CHLORIDE, SODIUM LACTATE, POTASSIUM CHLORIDE, CALCIUM CHLORIDE 600; 310; 30; 20 MG/100ML; MG/100ML; MG/100ML; MG/100ML
1500 INJECTION, SOLUTION INTRAVENOUS ONCE
Status: COMPLETED | OUTPATIENT
Start: 2022-05-19 | End: 2022-05-19

## 2022-05-19 RX ORDER — SODIUM CHLORIDE, SODIUM LACTATE, POTASSIUM CHLORIDE, CALCIUM CHLORIDE 600; 310; 30; 20 MG/100ML; MG/100ML; MG/100ML; MG/100ML
1500 INJECTION, SOLUTION INTRAVENOUS ONCE
Status: CANCELLED
Start: 2022-05-22 | End: 2022-05-22

## 2022-05-19 RX ADMIN — SODIUM CHLORIDE, SODIUM LACTATE, POTASSIUM CHLORIDE, AND CALCIUM CHLORIDE 1500 ML: .6; .31; .03; .02 INJECTION, SOLUTION INTRAVENOUS at 11:40

## 2022-05-23 ENCOUNTER — HOSPITAL ENCOUNTER (OUTPATIENT)
Dept: INFUSION CENTER | Facility: CLINIC | Age: 47
End: 2022-05-23

## 2022-05-24 ENCOUNTER — HOSPITAL ENCOUNTER (OUTPATIENT)
Dept: INFUSION CENTER | Facility: CLINIC | Age: 47
Discharge: HOME | End: 2022-05-24

## 2022-05-26 ENCOUNTER — HOSPITAL ENCOUNTER (OUTPATIENT)
Dept: INFUSION CENTER | Facility: CLINIC | Age: 47
Discharge: HOME | End: 2022-05-26

## 2022-05-31 ENCOUNTER — HOSPITAL ENCOUNTER (OUTPATIENT)
Dept: INFUSION CENTER | Facility: CLINIC | Age: 47
Discharge: HOME | End: 2022-05-31
Payer: COMMERCIAL

## 2022-05-31 VITALS
RESPIRATION RATE: 18 BRPM | HEART RATE: 108 BPM | DIASTOLIC BLOOD PRESSURE: 88 MMHG | TEMPERATURE: 98.4 F | SYSTOLIC BLOOD PRESSURE: 141 MMHG

## 2022-05-31 DIAGNOSIS — R63.0 ANOREXIA: Primary | ICD-10-CM

## 2022-05-31 DIAGNOSIS — R19.7 DIARRHEA, UNSPECIFIED TYPE: ICD-10-CM

## 2022-05-31 DIAGNOSIS — E44.1 MILD PROTEIN-CALORIE MALNUTRITION (HCC): ICD-10-CM

## 2022-05-31 PROCEDURE — 96361 HYDRATE IV INFUSION ADD-ON: CPT

## 2022-05-31 PROCEDURE — 96375 TX/PRO/DX INJ NEW DRUG ADDON: CPT

## 2022-05-31 PROCEDURE — 96365 THER/PROPH/DIAG IV INF INIT: CPT

## 2022-05-31 RX ORDER — SODIUM CHLORIDE, SODIUM LACTATE, POTASSIUM CHLORIDE, CALCIUM CHLORIDE 600; 310; 30; 20 MG/100ML; MG/100ML; MG/100ML; MG/100ML
1500 INJECTION, SOLUTION INTRAVENOUS ONCE
Status: CANCELLED
Start: 2022-06-03 | End: 2022-06-03

## 2022-05-31 RX ORDER — METHYLPREDNISOLONE SODIUM SUCCINATE 125 MG/2ML
60 INJECTION, POWDER, LYOPHILIZED, FOR SOLUTION INTRAMUSCULAR; INTRAVENOUS ONCE
Status: COMPLETED | OUTPATIENT
Start: 2022-05-31 | End: 2022-05-31

## 2022-05-31 RX ORDER — METHYLPREDNISOLONE SODIUM SUCCINATE 125 MG/2ML
60 INJECTION, POWDER, LYOPHILIZED, FOR SOLUTION INTRAMUSCULAR; INTRAVENOUS ONCE
Status: CANCELLED
Start: 2022-06-06 | End: 2022-06-03

## 2022-05-31 RX ORDER — SODIUM CHLORIDE, SODIUM LACTATE, POTASSIUM CHLORIDE, CALCIUM CHLORIDE 600; 310; 30; 20 MG/100ML; MG/100ML; MG/100ML; MG/100ML
1500 INJECTION, SOLUTION INTRAVENOUS ONCE
Status: COMPLETED | OUTPATIENT
Start: 2022-05-31 | End: 2022-05-31

## 2022-05-31 RX ADMIN — METHYLPREDNISOLONE SODIUM SUCCINATE 60 MG: 125 INJECTION, POWDER, FOR SOLUTION INTRAMUSCULAR; INTRAVENOUS at 12:06

## 2022-05-31 RX ADMIN — SODIUM CHLORIDE, SODIUM LACTATE, POTASSIUM CHLORIDE, AND CALCIUM CHLORIDE 1500 ML: .6; .31; .03; .02 INJECTION, SOLUTION INTRAVENOUS at 12:27

## 2022-05-31 RX ADMIN — DIPHENHYDRAMINE HYDROCHLORIDE 50 MG: 50 INJECTION, SOLUTION INTRAMUSCULAR; INTRAVENOUS at 11:41

## 2022-05-31 NOTE — PROGRESS NOTES
Patient tolerated today's infusion without incident  Pt is aware of all future appointments   Declined AVS

## 2022-06-02 ENCOUNTER — APPOINTMENT (OUTPATIENT)
Dept: LAB | Facility: HOSPITAL | Age: 47
End: 2022-06-02
Attending: INTERNAL MEDICINE
Payer: COMMERCIAL

## 2022-06-02 ENCOUNTER — TELEPHONE (OUTPATIENT)
Dept: GASTROENTEROLOGY | Facility: MEDICAL CENTER | Age: 47
End: 2022-06-02

## 2022-06-02 ENCOUNTER — HOSPITAL ENCOUNTER (OUTPATIENT)
Dept: INFUSION CENTER | Facility: CLINIC | Age: 47
Discharge: HOME | End: 2022-06-02
Payer: COMMERCIAL

## 2022-06-02 VITALS
SYSTOLIC BLOOD PRESSURE: 127 MMHG | RESPIRATION RATE: 18 BRPM | HEART RATE: 106 BPM | TEMPERATURE: 97.9 F | DIASTOLIC BLOOD PRESSURE: 89 MMHG

## 2022-06-02 DIAGNOSIS — E44.1 MILD PROTEIN-CALORIE MALNUTRITION (HCC): ICD-10-CM

## 2022-06-02 DIAGNOSIS — K52.9 INFLAMMATORY BOWEL DISEASE: ICD-10-CM

## 2022-06-02 DIAGNOSIS — D72.829 LEUKOCYTOSIS, UNSPECIFIED TYPE: ICD-10-CM

## 2022-06-02 DIAGNOSIS — R63.0 ANOREXIA: Primary | ICD-10-CM

## 2022-06-02 DIAGNOSIS — L50.9 URTICARIA: ICD-10-CM

## 2022-06-02 DIAGNOSIS — N95.1 VASOMOTOR SYMPTOMS DUE TO MENOPAUSE: ICD-10-CM

## 2022-06-02 DIAGNOSIS — D89.40 MAST CELL ACTIVATION SYNDROME (HCC): Primary | ICD-10-CM

## 2022-06-02 DIAGNOSIS — R19.7 DIARRHEA, UNSPECIFIED TYPE: ICD-10-CM

## 2022-06-02 LAB
ALBUMIN SERPL BCP-MCNC: 3.5 G/DL (ref 3.5–5)
ALP SERPL-CCNC: 69 U/L (ref 46–116)
ALT SERPL W P-5'-P-CCNC: 26 U/L (ref 12–78)
ANION GAP SERPL CALCULATED.3IONS-SCNC: 14 MMOL/L (ref 4–13)
AST SERPL W P-5'-P-CCNC: 19 U/L (ref 5–45)
BASOPHILS # BLD AUTO: 0.09 THOUSANDS/ΜL (ref 0–0.1)
BASOPHILS NFR BLD AUTO: 1 % (ref 0–1)
BILIRUB SERPL-MCNC: 0.24 MG/DL (ref 0.2–1)
BUN SERPL-MCNC: 12 MG/DL (ref 5–25)
CALCIUM SERPL-MCNC: 8.6 MG/DL (ref 8.3–10.1)
CHLORIDE SERPL-SCNC: 102 MMOL/L (ref 100–108)
CO2 SERPL-SCNC: 22 MMOL/L (ref 21–32)
CREAT SERPL-MCNC: 1.08 MG/DL (ref 0.6–1.3)
EOSINOPHIL # BLD AUTO: 0 THOUSAND/ΜL (ref 0–0.61)
EOSINOPHIL NFR BLD AUTO: 0 % (ref 0–6)
ERYTHROCYTE [DISTWIDTH] IN BLOOD BY AUTOMATED COUNT: 15.6 % (ref 11.6–15.1)
GFR SERPL CREATININE-BSD FRML MDRD: 60 ML/MIN/1.73SQ M
GLUCOSE SERPL-MCNC: 250 MG/DL (ref 65–140)
HCT VFR BLD AUTO: 39.2 % (ref 34.8–46.1)
HGB BLD-MCNC: 12.9 G/DL (ref 11.5–15.4)
IMM GRANULOCYTES # BLD AUTO: 0.07 THOUSAND/UL (ref 0–0.2)
IMM GRANULOCYTES NFR BLD AUTO: 1 % (ref 0–2)
LYMPHOCYTES # BLD AUTO: 1.04 THOUSANDS/ΜL (ref 0.6–4.47)
LYMPHOCYTES NFR BLD AUTO: 9 % (ref 14–44)
MCH RBC QN AUTO: 28.5 PG (ref 26.8–34.3)
MCHC RBC AUTO-ENTMCNC: 32.9 G/DL (ref 31.4–37.4)
MCV RBC AUTO: 87 FL (ref 82–98)
MONOCYTES # BLD AUTO: 0.12 THOUSAND/ΜL (ref 0.17–1.22)
MONOCYTES NFR BLD AUTO: 1 % (ref 4–12)
NEUTROPHILS # BLD AUTO: 10.52 THOUSANDS/ΜL (ref 1.85–7.62)
NEUTS SEG NFR BLD AUTO: 88 % (ref 43–75)
NRBC BLD AUTO-RTO: 0 /100 WBCS
PLATELET # BLD AUTO: 473 THOUSANDS/UL (ref 149–390)
PMV BLD AUTO: 8.9 FL (ref 8.9–12.7)
POTASSIUM SERPL-SCNC: 3.8 MMOL/L (ref 3.5–5.3)
PROT SERPL-MCNC: 7.6 G/DL (ref 6.4–8.2)
RBC # BLD AUTO: 4.52 MILLION/UL (ref 3.81–5.12)
SODIUM SERPL-SCNC: 138 MMOL/L (ref 136–145)
WBC # BLD AUTO: 11.84 THOUSAND/UL (ref 4.31–10.16)

## 2022-06-02 PROCEDURE — 36415 COLL VENOUS BLD VENIPUNCTURE: CPT

## 2022-06-02 PROCEDURE — 83002 ASSAY OF GONADOTROPIN (LH): CPT

## 2022-06-02 PROCEDURE — 82670 ASSAY OF TOTAL ESTRADIOL: CPT

## 2022-06-02 PROCEDURE — 96360 HYDRATION IV INFUSION INIT: CPT

## 2022-06-02 PROCEDURE — 96361 HYDRATE IV INFUSION ADD-ON: CPT

## 2022-06-02 PROCEDURE — 83520 IMMUNOASSAY QUANT NOS NONAB: CPT

## 2022-06-02 PROCEDURE — 80053 COMPREHEN METABOLIC PANEL: CPT

## 2022-06-02 PROCEDURE — 83001 ASSAY OF GONADOTROPIN (FSH): CPT

## 2022-06-02 PROCEDURE — 85025 COMPLETE CBC W/AUTO DIFF WBC: CPT

## 2022-06-02 RX ORDER — METHYLPREDNISOLONE SODIUM SUCCINATE 125 MG/2ML
60 INJECTION, POWDER, LYOPHILIZED, FOR SOLUTION INTRAMUSCULAR; INTRAVENOUS ONCE
Status: CANCELLED
Start: 2022-06-06 | End: 2022-06-03

## 2022-06-02 RX ORDER — SODIUM CHLORIDE, SODIUM LACTATE, POTASSIUM CHLORIDE, CALCIUM CHLORIDE 600; 310; 30; 20 MG/100ML; MG/100ML; MG/100ML; MG/100ML
1500 INJECTION, SOLUTION INTRAVENOUS ONCE
Status: COMPLETED | OUTPATIENT
Start: 2022-06-02 | End: 2022-06-02

## 2022-06-02 RX ORDER — SODIUM CHLORIDE, SODIUM LACTATE, POTASSIUM CHLORIDE, CALCIUM CHLORIDE 600; 310; 30; 20 MG/100ML; MG/100ML; MG/100ML; MG/100ML
1500 INJECTION, SOLUTION INTRAVENOUS ONCE
Status: CANCELLED
Start: 2022-06-03 | End: 2022-06-03

## 2022-06-02 RX ORDER — FAMOTIDINE 40 MG/1
40 TABLET, FILM COATED ORAL DAILY
Qty: 90 TABLET | Refills: 0 | Status: SHIPPED | OUTPATIENT
Start: 2022-06-02 | End: 2023-03-10

## 2022-06-02 RX ADMIN — SODIUM CHLORIDE, SODIUM LACTATE, POTASSIUM CHLORIDE, AND CALCIUM CHLORIDE 1000 ML: .6; .31; .03; .02 INJECTION, SOLUTION INTRAVENOUS at 11:23

## 2022-06-02 NOTE — TELEPHONE ENCOUNTER
Pt stopped into office to discuss obtaining a new famotidine script  She had a reaction to famotidine (40mg) and the liquid famotidine is too expensive  Pt states her pharmacy found another famotidine tablet that would work for her, they just need a new script placed  Pt did not give the specific name of the medicine but she can be reached at [phone] with any questions

## 2022-06-02 NOTE — PROGRESS NOTES
Pt tolerated hydration without incident  Pt requested that she only receive 1 liter of fluid today  Pt declined AVS but is aware of her next appt

## 2022-06-03 LAB
ESTRADIOL SERPL-MCNC: 68 PG/ML
FSH SERPL-ACNC: 11.9 MIU/ML
LH SERPL-ACNC: 14.1 MIU/ML

## 2022-06-03 RX ORDER — SYRINGE WITH NEEDLE, 1 ML 25GX5/8"
SYRINGE, EMPTY DISPOSABLE MISCELLANEOUS
COMMUNITY
Start: 2022-06-01

## 2022-06-03 RX ORDER — LANCETS
EACH MISCELLANEOUS
COMMUNITY
Start: 2022-04-15

## 2022-06-05 LAB — TRYPTASE SERPL-MCNC: 9.4 UG/L (ref 2.2–13.2)

## 2022-06-06 ENCOUNTER — HOSPITAL ENCOUNTER (OUTPATIENT)
Dept: INFUSION CENTER | Facility: CLINIC | Age: 47
Discharge: HOME | End: 2022-06-06
Payer: COMMERCIAL

## 2022-06-06 DIAGNOSIS — R19.7 DIARRHEA, UNSPECIFIED TYPE: ICD-10-CM

## 2022-06-06 DIAGNOSIS — R63.0 ANOREXIA: Primary | ICD-10-CM

## 2022-06-06 DIAGNOSIS — E44.1 MILD PROTEIN-CALORIE MALNUTRITION (HCC): ICD-10-CM

## 2022-06-06 PROCEDURE — 96361 HYDRATE IV INFUSION ADD-ON: CPT

## 2022-06-06 PROCEDURE — 96375 TX/PRO/DX INJ NEW DRUG ADDON: CPT

## 2022-06-06 PROCEDURE — 96365 THER/PROPH/DIAG IV INF INIT: CPT

## 2022-06-06 RX ORDER — METHYLPREDNISOLONE SODIUM SUCCINATE 125 MG/2ML
60 INJECTION, POWDER, LYOPHILIZED, FOR SOLUTION INTRAMUSCULAR; INTRAVENOUS ONCE
Status: COMPLETED | OUTPATIENT
Start: 2022-06-06 | End: 2022-06-06

## 2022-06-06 RX ORDER — METHYLPREDNISOLONE SODIUM SUCCINATE 125 MG/2ML
60 INJECTION, POWDER, LYOPHILIZED, FOR SOLUTION INTRAMUSCULAR; INTRAVENOUS ONCE
Status: CANCELLED
Start: 2022-06-13 | End: 2022-06-08

## 2022-06-06 RX ORDER — SODIUM CHLORIDE, SODIUM LACTATE, POTASSIUM CHLORIDE, CALCIUM CHLORIDE 600; 310; 30; 20 MG/100ML; MG/100ML; MG/100ML; MG/100ML
1500 INJECTION, SOLUTION INTRAVENOUS ONCE
Status: COMPLETED | OUTPATIENT
Start: 2022-06-06 | End: 2022-06-06

## 2022-06-06 RX ORDER — SODIUM CHLORIDE, SODIUM LACTATE, POTASSIUM CHLORIDE, CALCIUM CHLORIDE 600; 310; 30; 20 MG/100ML; MG/100ML; MG/100ML; MG/100ML
1500 INJECTION, SOLUTION INTRAVENOUS ONCE
Status: CANCELLED
Start: 2022-06-08 | End: 2022-06-08

## 2022-06-06 RX ADMIN — SODIUM CHLORIDE, SODIUM LACTATE, POTASSIUM CHLORIDE, AND CALCIUM CHLORIDE 1500 ML: .6; .31; .03; .02 INJECTION, SOLUTION INTRAVENOUS at 12:13

## 2022-06-06 RX ADMIN — METHYLPREDNISOLONE SODIUM SUCCINATE 60 MG: 125 INJECTION, POWDER, FOR SOLUTION INTRAMUSCULAR; INTRAVENOUS at 11:53

## 2022-06-06 RX ADMIN — DIPHENHYDRAMINE HYDROCHLORIDE 50 MG: 50 INJECTION, SOLUTION INTRAMUSCULAR; INTRAVENOUS at 11:33

## 2022-06-08 ENCOUNTER — TELEPHONE (OUTPATIENT)
Dept: UROLOGY | Facility: MEDICAL CENTER | Age: 47
End: 2022-06-08

## 2022-06-09 ENCOUNTER — TELEPHONE (OUTPATIENT)
Dept: UROLOGY | Facility: MEDICAL CENTER | Age: 47
End: 2022-06-09

## 2022-06-09 NOTE — TELEPHONE ENCOUNTER
Called pt and left msg to return call to office  When pt returns call she is to be asked if she received message about her appt tomorrow (6/10) being cancelled  Will need more information about the reason for the appt as it is unclear  Who is referring her? Pt lives in [place] and may wish to be seen there

## 2022-06-09 NOTE — TELEPHONE ENCOUNTER
Left message for patient that appointment will need to be cancelled and rescheduled from 6/10/22 with Bennett Gaming at the [facility]  Patient lives in [place], that office may be more convenient

## 2022-06-10 ENCOUNTER — HOSPITAL ENCOUNTER (OUTPATIENT)
Dept: INFUSION CENTER | Facility: CLINIC | Age: 47
End: 2022-06-10

## 2022-06-13 ENCOUNTER — HOSPITAL ENCOUNTER (OUTPATIENT)
Dept: INFUSION CENTER | Facility: CLINIC | Age: 47
Discharge: HOME | End: 2022-06-13
Payer: COMMERCIAL

## 2022-06-13 VITALS
TEMPERATURE: 98.3 F | DIASTOLIC BLOOD PRESSURE: 78 MMHG | SYSTOLIC BLOOD PRESSURE: 142 MMHG | HEART RATE: 111 BPM | RESPIRATION RATE: 18 BRPM

## 2022-06-13 DIAGNOSIS — E44.1 MILD PROTEIN-CALORIE MALNUTRITION (HCC): ICD-10-CM

## 2022-06-13 DIAGNOSIS — R19.7 DIARRHEA, UNSPECIFIED TYPE: ICD-10-CM

## 2022-06-13 DIAGNOSIS — R63.0 ANOREXIA: Primary | ICD-10-CM

## 2022-06-13 PROCEDURE — 96375 TX/PRO/DX INJ NEW DRUG ADDON: CPT

## 2022-06-13 PROCEDURE — 96361 HYDRATE IV INFUSION ADD-ON: CPT

## 2022-06-13 PROCEDURE — 96365 THER/PROPH/DIAG IV INF INIT: CPT

## 2022-06-13 RX ORDER — METHYLPREDNISOLONE SODIUM SUCCINATE 125 MG/2ML
60 INJECTION, POWDER, LYOPHILIZED, FOR SOLUTION INTRAMUSCULAR; INTRAVENOUS ONCE
Status: CANCELLED
Start: 2022-06-20 | End: 2022-06-15

## 2022-06-13 RX ORDER — SODIUM CHLORIDE, SODIUM LACTATE, POTASSIUM CHLORIDE, CALCIUM CHLORIDE 600; 310; 30; 20 MG/100ML; MG/100ML; MG/100ML; MG/100ML
1500 INJECTION, SOLUTION INTRAVENOUS ONCE
Status: COMPLETED | OUTPATIENT
Start: 2022-06-13 | End: 2022-06-13

## 2022-06-13 RX ORDER — METHYLPREDNISOLONE SODIUM SUCCINATE 125 MG/2ML
60 INJECTION, POWDER, LYOPHILIZED, FOR SOLUTION INTRAMUSCULAR; INTRAVENOUS ONCE
Status: COMPLETED | OUTPATIENT
Start: 2022-06-13 | End: 2022-06-13

## 2022-06-13 RX ORDER — SODIUM CHLORIDE, SODIUM LACTATE, POTASSIUM CHLORIDE, CALCIUM CHLORIDE 600; 310; 30; 20 MG/100ML; MG/100ML; MG/100ML; MG/100ML
1500 INJECTION, SOLUTION INTRAVENOUS ONCE
Status: CANCELLED
Start: 2022-06-15 | End: 2022-06-15

## 2022-06-13 RX ADMIN — DIPHENHYDRAMINE HYDROCHLORIDE 50 MG: 50 INJECTION, SOLUTION INTRAMUSCULAR; INTRAVENOUS at 11:37

## 2022-06-13 RX ADMIN — SODIUM CHLORIDE, SODIUM LACTATE, POTASSIUM CHLORIDE, AND CALCIUM CHLORIDE 1500 ML: .6; .31; .03; .02 INJECTION, SOLUTION INTRAVENOUS at 12:16

## 2022-06-13 RX ADMIN — METHYLPREDNISOLONE SODIUM SUCCINATE 60 MG: 125 INJECTION, POWDER, FOR SOLUTION INTRAMUSCULAR; INTRAVENOUS at 12:07

## 2022-06-13 NOTE — PROGRESS NOTES
Patient rang call bell and asked RN to check IV site  IV appeared to be infiltrated  Fluids stopped immediately  IV site cold, hard to touch, mild swelling  IV d/c'd, gauze and wrap placed  Patient only had 165mL of Lactated Ringers left in bag  Patient did not want to have a new IV placed to receive remaining fluids  Patient educated to use warm compresses in the event of discomfort  Patient declined AVS, aware of future appointment, left unit in stable condition

## 2022-06-17 ENCOUNTER — HOSPITAL ENCOUNTER (OUTPATIENT)
Dept: INFUSION CENTER | Facility: CLINIC | Age: 47
Discharge: HOME | End: 2022-06-17
Payer: COMMERCIAL

## 2022-06-17 VITALS
SYSTOLIC BLOOD PRESSURE: 114 MMHG | TEMPERATURE: 98.1 F | HEART RATE: 101 BPM | DIASTOLIC BLOOD PRESSURE: 88 MMHG | RESPIRATION RATE: 18 BRPM

## 2022-06-17 DIAGNOSIS — E44.1 MILD PROTEIN-CALORIE MALNUTRITION (HCC): ICD-10-CM

## 2022-06-17 DIAGNOSIS — R63.0 ANOREXIA: Primary | ICD-10-CM

## 2022-06-17 DIAGNOSIS — R19.7 DIARRHEA, UNSPECIFIED TYPE: ICD-10-CM

## 2022-06-17 PROCEDURE — 96360 HYDRATION IV INFUSION INIT: CPT

## 2022-06-17 PROCEDURE — 96361 HYDRATE IV INFUSION ADD-ON: CPT

## 2022-06-17 RX ORDER — METHYLPREDNISOLONE SODIUM SUCCINATE 125 MG/2ML
60 INJECTION, POWDER, LYOPHILIZED, FOR SOLUTION INTRAMUSCULAR; INTRAVENOUS ONCE
Status: CANCELLED
Start: 2022-06-20 | End: 2022-06-19

## 2022-06-17 RX ORDER — SODIUM CHLORIDE, SODIUM LACTATE, POTASSIUM CHLORIDE, CALCIUM CHLORIDE 600; 310; 30; 20 MG/100ML; MG/100ML; MG/100ML; MG/100ML
1500 INJECTION, SOLUTION INTRAVENOUS ONCE
Status: COMPLETED | OUTPATIENT
Start: 2022-06-17 | End: 2022-06-17

## 2022-06-17 RX ORDER — SODIUM CHLORIDE, SODIUM LACTATE, POTASSIUM CHLORIDE, CALCIUM CHLORIDE 600; 310; 30; 20 MG/100ML; MG/100ML; MG/100ML; MG/100ML
1500 INJECTION, SOLUTION INTRAVENOUS ONCE
Status: CANCELLED
Start: 2022-06-20 | End: 2022-06-19

## 2022-06-17 RX ADMIN — SODIUM CHLORIDE, SODIUM LACTATE, POTASSIUM CHLORIDE, AND CALCIUM CHLORIDE 1500 ML: .6; .31; .03; .02 INJECTION, SOLUTION INTRAVENOUS at 11:58

## 2022-06-20 ENCOUNTER — HOSPITAL ENCOUNTER (OUTPATIENT)
Dept: INFUSION CENTER | Facility: CLINIC | Age: 47
Discharge: HOME | End: 2022-06-20
Payer: COMMERCIAL

## 2022-06-20 VITALS
DIASTOLIC BLOOD PRESSURE: 86 MMHG | RESPIRATION RATE: 18 BRPM | SYSTOLIC BLOOD PRESSURE: 123 MMHG | HEART RATE: 117 BPM | TEMPERATURE: 98.1 F

## 2022-06-20 DIAGNOSIS — R63.0 ANOREXIA: Primary | ICD-10-CM

## 2022-06-20 DIAGNOSIS — R19.7 DIARRHEA, UNSPECIFIED TYPE: ICD-10-CM

## 2022-06-20 DIAGNOSIS — E44.1 MILD PROTEIN-CALORIE MALNUTRITION (HCC): ICD-10-CM

## 2022-06-20 PROCEDURE — 96375 TX/PRO/DX INJ NEW DRUG ADDON: CPT

## 2022-06-20 PROCEDURE — 96365 THER/PROPH/DIAG IV INF INIT: CPT

## 2022-06-20 PROCEDURE — 96361 HYDRATE IV INFUSION ADD-ON: CPT

## 2022-06-20 RX ORDER — SODIUM CHLORIDE, SODIUM LACTATE, POTASSIUM CHLORIDE, CALCIUM CHLORIDE 600; 310; 30; 20 MG/100ML; MG/100ML; MG/100ML; MG/100ML
1500 INJECTION, SOLUTION INTRAVENOUS ONCE
Status: COMPLETED | OUTPATIENT
Start: 2022-06-20 | End: 2022-06-20

## 2022-06-20 RX ORDER — SODIUM CHLORIDE, SODIUM LACTATE, POTASSIUM CHLORIDE, CALCIUM CHLORIDE 600; 310; 30; 20 MG/100ML; MG/100ML; MG/100ML; MG/100ML
1500 INJECTION, SOLUTION INTRAVENOUS ONCE
Status: CANCELLED
Start: 2022-06-23 | End: 2022-06-23

## 2022-06-20 RX ORDER — METHYLPREDNISOLONE SODIUM SUCCINATE 125 MG/2ML
60 INJECTION, POWDER, LYOPHILIZED, FOR SOLUTION INTRAMUSCULAR; INTRAVENOUS ONCE
Status: CANCELLED
Start: 2022-07-01 | End: 2022-06-23

## 2022-06-20 RX ORDER — METHYLPREDNISOLONE SODIUM SUCCINATE 125 MG/2ML
60 INJECTION, POWDER, LYOPHILIZED, FOR SOLUTION INTRAMUSCULAR; INTRAVENOUS ONCE
Status: COMPLETED | OUTPATIENT
Start: 2022-06-20 | End: 2022-06-20

## 2022-06-20 RX ADMIN — SODIUM CHLORIDE, SODIUM LACTATE, POTASSIUM CHLORIDE, AND CALCIUM CHLORIDE 1500 ML: .6; .31; .03; .02 INJECTION, SOLUTION INTRAVENOUS at 12:14

## 2022-06-20 RX ADMIN — METHYLPREDNISOLONE SODIUM SUCCINATE 60 MG: 125 INJECTION, POWDER, FOR SOLUTION INTRAMUSCULAR; INTRAVENOUS at 12:08

## 2022-06-20 RX ADMIN — DIPHENHYDRAMINE HYDROCHLORIDE 50 MG: 50 INJECTION, SOLUTION INTRAMUSCULAR; INTRAVENOUS at 11:40

## 2022-06-30 ENCOUNTER — DOCUMENTATION (OUTPATIENT)
Dept: GYNECOLOGY | Facility: CLINIC | Age: 47
End: 2022-06-30

## 2022-07-01 ENCOUNTER — HOSPITAL ENCOUNTER (OUTPATIENT)
Dept: INFUSION CENTER | Facility: CLINIC | Age: 47
Discharge: HOME | End: 2022-07-01
Payer: COMMERCIAL

## 2022-07-01 VITALS
RESPIRATION RATE: 18 BRPM | DIASTOLIC BLOOD PRESSURE: 100 MMHG | SYSTOLIC BLOOD PRESSURE: 153 MMHG | HEART RATE: 100 BPM | TEMPERATURE: 97.9 F

## 2022-07-01 DIAGNOSIS — R19.7 DIARRHEA, UNSPECIFIED TYPE: ICD-10-CM

## 2022-07-01 DIAGNOSIS — E44.1 MILD PROTEIN-CALORIE MALNUTRITION (HCC): ICD-10-CM

## 2022-07-01 DIAGNOSIS — R63.0 ANOREXIA: Primary | ICD-10-CM

## 2022-07-01 PROCEDURE — 96375 TX/PRO/DX INJ NEW DRUG ADDON: CPT

## 2022-07-01 PROCEDURE — 96361 HYDRATE IV INFUSION ADD-ON: CPT

## 2022-07-01 PROCEDURE — 96365 THER/PROPH/DIAG IV INF INIT: CPT

## 2022-07-01 RX ORDER — SODIUM CHLORIDE, SODIUM LACTATE, POTASSIUM CHLORIDE, CALCIUM CHLORIDE 600; 310; 30; 20 MG/100ML; MG/100ML; MG/100ML; MG/100ML
1500 INJECTION, SOLUTION INTRAVENOUS ONCE
Status: CANCELLED
Start: 2022-07-02 | End: 2022-07-02

## 2022-07-01 RX ORDER — METHYLPREDNISOLONE SODIUM SUCCINATE 125 MG/2ML
60 INJECTION, POWDER, LYOPHILIZED, FOR SOLUTION INTRAMUSCULAR; INTRAVENOUS ONCE
Status: CANCELLED
Start: 2022-07-02 | End: 2022-07-02

## 2022-07-01 RX ORDER — METHYLPREDNISOLONE SODIUM SUCCINATE 125 MG/2ML
60 INJECTION, POWDER, LYOPHILIZED, FOR SOLUTION INTRAMUSCULAR; INTRAVENOUS ONCE
Status: COMPLETED | OUTPATIENT
Start: 2022-07-01 | End: 2022-07-01

## 2022-07-01 RX ORDER — SODIUM CHLORIDE, SODIUM LACTATE, POTASSIUM CHLORIDE, CALCIUM CHLORIDE 600; 310; 30; 20 MG/100ML; MG/100ML; MG/100ML; MG/100ML
1500 INJECTION, SOLUTION INTRAVENOUS ONCE
Status: COMPLETED | OUTPATIENT
Start: 2022-07-01 | End: 2022-07-01

## 2022-07-01 RX ADMIN — METHYLPREDNISOLONE SODIUM SUCCINATE 60 MG: 125 INJECTION, POWDER, FOR SOLUTION INTRAMUSCULAR; INTRAVENOUS at 12:26

## 2022-07-01 RX ADMIN — SODIUM CHLORIDE, SODIUM LACTATE, POTASSIUM CHLORIDE, AND CALCIUM CHLORIDE 1500 ML: .6; .31; .03; .02 INJECTION, SOLUTION INTRAVENOUS at 12:42

## 2022-07-01 RX ADMIN — DIPHENHYDRAMINE HYDROCHLORIDE 50 MG: 50 INJECTION, SOLUTION INTRAMUSCULAR; INTRAVENOUS at 12:01

## 2022-07-01 NOTE — PROGRESS NOTES
Pt presents for LR hydration  No new meds or concerns  Pt tolerated infusion without adverse reaction  Future visits discussed  AVS declined

## 2022-07-05 ENCOUNTER — HOSPITAL ENCOUNTER (OUTPATIENT)
Dept: INFUSION CENTER | Facility: CLINIC | Age: 47
Discharge: HOME | End: 2022-07-05
Payer: COMMERCIAL

## 2022-07-05 ENCOUNTER — APPOINTMENT (OUTPATIENT)
Dept: LAB | Facility: HOSPITAL | Age: 47
End: 2022-07-05
Payer: COMMERCIAL

## 2022-07-05 VITALS
SYSTOLIC BLOOD PRESSURE: 125 MMHG | TEMPERATURE: 97.8 F | HEART RATE: 98 BPM | DIASTOLIC BLOOD PRESSURE: 87 MMHG | RESPIRATION RATE: 18 BRPM

## 2022-07-05 DIAGNOSIS — L50.9 URTICARIA, UNSPECIFIED: ICD-10-CM

## 2022-07-05 DIAGNOSIS — K52.9 INFLAMMATORY BOWEL DISEASE: ICD-10-CM

## 2022-07-05 DIAGNOSIS — R63.0 ANOREXIA: Primary | ICD-10-CM

## 2022-07-05 DIAGNOSIS — D84.9 IMMUNOSUPPRESSION-RELATED INFECTIOUS DISEASE (HCC): ICD-10-CM

## 2022-07-05 DIAGNOSIS — R19.7 DIARRHEA, UNSPECIFIED TYPE: ICD-10-CM

## 2022-07-05 DIAGNOSIS — B99.8 IMMUNOSUPPRESSION-RELATED INFECTIOUS DISEASE (HCC): ICD-10-CM

## 2022-07-05 DIAGNOSIS — E44.1 MILD PROTEIN-CALORIE MALNUTRITION (HCC): ICD-10-CM

## 2022-07-05 DIAGNOSIS — K50.119 CROHN'S DISEASE OF LARGE INTESTINE WITH COMPLICATION (HCC): ICD-10-CM

## 2022-07-05 LAB
ALBUMIN SERPL BCP-MCNC: 3.5 G/DL (ref 3.5–5)
ALP SERPL-CCNC: 79 U/L (ref 46–116)
ALT SERPL W P-5'-P-CCNC: 26 U/L (ref 12–78)
ANION GAP SERPL CALCULATED.3IONS-SCNC: 11 MMOL/L (ref 4–13)
AST SERPL W P-5'-P-CCNC: 13 U/L (ref 5–45)
BASOPHILS # BLD AUTO: 0.04 THOUSANDS/ΜL (ref 0–0.1)
BASOPHILS NFR BLD AUTO: 1 % (ref 0–1)
BILIRUB SERPL-MCNC: 0.19 MG/DL (ref 0.2–1)
BUN SERPL-MCNC: 7 MG/DL (ref 5–25)
CALCIUM SERPL-MCNC: 8.4 MG/DL (ref 8.3–10.1)
CHLORIDE SERPL-SCNC: 102 MMOL/L (ref 100–108)
CO2 SERPL-SCNC: 23 MMOL/L (ref 21–32)
CREAT SERPL-MCNC: 0.89 MG/DL (ref 0.6–1.3)
CRP SERPL QL: 1.4 MG/L
EOSINOPHIL # BLD AUTO: 0 THOUSAND/ΜL (ref 0–0.61)
EOSINOPHIL NFR BLD AUTO: 0 % (ref 0–6)
ERYTHROCYTE [DISTWIDTH] IN BLOOD BY AUTOMATED COUNT: 15.2 % (ref 11.6–15.1)
GFR SERPL CREATININE-BSD FRML MDRD: 76 ML/MIN/1.73SQ M
GLUCOSE SERPL-MCNC: 168 MG/DL (ref 65–140)
HCT VFR BLD AUTO: 40.9 % (ref 34.8–46.1)
HGB BLD-MCNC: 13.4 G/DL (ref 11.5–15.4)
IMM GRANULOCYTES # BLD AUTO: 0.03 THOUSAND/UL (ref 0–0.2)
IMM GRANULOCYTES NFR BLD AUTO: 0 % (ref 0–2)
LYMPHOCYTES # BLD AUTO: 0.5 THOUSANDS/ΜL (ref 0.6–4.47)
LYMPHOCYTES NFR BLD AUTO: 7 % (ref 14–44)
MCH RBC QN AUTO: 29 PG (ref 26.8–34.3)
MCHC RBC AUTO-ENTMCNC: 32.8 G/DL (ref 31.4–37.4)
MCV RBC AUTO: 89 FL (ref 82–98)
MONOCYTES # BLD AUTO: 0.03 THOUSAND/ΜL (ref 0.17–1.22)
MONOCYTES NFR BLD AUTO: 0 % (ref 4–12)
NEUTROPHILS # BLD AUTO: 6.55 THOUSANDS/ΜL (ref 1.85–7.62)
NEUTS SEG NFR BLD AUTO: 92 % (ref 43–75)
NRBC BLD AUTO-RTO: 0 /100 WBCS
PLATELET # BLD AUTO: 522 THOUSANDS/UL (ref 149–390)
PMV BLD AUTO: 8.8 FL (ref 8.9–12.7)
POTASSIUM SERPL-SCNC: 3.9 MMOL/L (ref 3.5–5.3)
PROT SERPL-MCNC: 7.6 G/DL (ref 6.4–8.2)
RBC # BLD AUTO: 4.62 MILLION/UL (ref 3.81–5.12)
SODIUM SERPL-SCNC: 136 MMOL/L (ref 136–145)
WBC # BLD AUTO: 7.15 THOUSAND/UL (ref 4.31–10.16)

## 2022-07-05 PROCEDURE — 86140 C-REACTIVE PROTEIN: CPT

## 2022-07-05 PROCEDURE — 36415 COLL VENOUS BLD VENIPUNCTURE: CPT

## 2022-07-05 PROCEDURE — 96361 HYDRATE IV INFUSION ADD-ON: CPT

## 2022-07-05 PROCEDURE — 96365 THER/PROPH/DIAG IV INF INIT: CPT

## 2022-07-05 PROCEDURE — 85025 COMPLETE CBC W/AUTO DIFF WBC: CPT

## 2022-07-05 PROCEDURE — 86480 TB TEST CELL IMMUN MEASURE: CPT

## 2022-07-05 PROCEDURE — 80053 COMPREHEN METABOLIC PANEL: CPT

## 2022-07-05 PROCEDURE — 96375 TX/PRO/DX INJ NEW DRUG ADDON: CPT

## 2022-07-05 PROCEDURE — 83520 IMMUNOASSAY QUANT NOS NONAB: CPT

## 2022-07-05 RX ORDER — METHYLPREDNISOLONE SODIUM SUCCINATE 125 MG/2ML
60 INJECTION, POWDER, LYOPHILIZED, FOR SOLUTION INTRAMUSCULAR; INTRAVENOUS ONCE
Status: CANCELLED
Start: 2022-07-07 | End: 2022-07-07

## 2022-07-05 RX ORDER — METHYLPREDNISOLONE SODIUM SUCCINATE 125 MG/2ML
60 INJECTION, POWDER, LYOPHILIZED, FOR SOLUTION INTRAMUSCULAR; INTRAVENOUS ONCE
Status: COMPLETED | OUTPATIENT
Start: 2022-07-05 | End: 2022-07-05

## 2022-07-05 RX ORDER — SODIUM CHLORIDE, SODIUM LACTATE, POTASSIUM CHLORIDE, CALCIUM CHLORIDE 600; 310; 30; 20 MG/100ML; MG/100ML; MG/100ML; MG/100ML
1500 INJECTION, SOLUTION INTRAVENOUS ONCE
Status: COMPLETED | OUTPATIENT
Start: 2022-07-05 | End: 2022-07-05

## 2022-07-05 RX ORDER — SODIUM CHLORIDE, SODIUM LACTATE, POTASSIUM CHLORIDE, CALCIUM CHLORIDE 600; 310; 30; 20 MG/100ML; MG/100ML; MG/100ML; MG/100ML
1500 INJECTION, SOLUTION INTRAVENOUS ONCE
Status: CANCELLED
Start: 2022-07-07 | End: 2022-07-07

## 2022-07-05 RX ADMIN — SODIUM CHLORIDE, SODIUM LACTATE, POTASSIUM CHLORIDE, AND CALCIUM CHLORIDE 1500 ML: .6; .31; .03; .02 INJECTION, SOLUTION INTRAVENOUS at 12:06

## 2022-07-05 RX ADMIN — DIPHENHYDRAMINE HYDROCHLORIDE 50 MG: 50 INJECTION, SOLUTION INTRAMUSCULAR; INTRAVENOUS at 11:31

## 2022-07-05 RX ADMIN — METHYLPREDNISOLONE SODIUM SUCCINATE 60 MG: 125 INJECTION, POWDER, FOR SOLUTION INTRAMUSCULAR; INTRAVENOUS at 11:25

## 2022-07-07 ENCOUNTER — OFFICE VISIT (OUTPATIENT)
Dept: GASTROENTEROLOGY | Facility: MEDICAL CENTER | Age: 47
End: 2022-07-07
Payer: COMMERCIAL

## 2022-07-07 ENCOUNTER — HOSPITAL ENCOUNTER (OUTPATIENT)
Dept: INFUSION CENTER | Facility: CLINIC | Age: 47
Discharge: HOME | End: 2022-07-07
Payer: COMMERCIAL

## 2022-07-07 VITALS
DIASTOLIC BLOOD PRESSURE: 76 MMHG | SYSTOLIC BLOOD PRESSURE: 124 MMHG | WEIGHT: 156.8 LBS | BODY MASS INDEX: 25.89 KG/M2 | HEART RATE: 102 BPM | OXYGEN SATURATION: 98 %

## 2022-07-07 VITALS
SYSTOLIC BLOOD PRESSURE: 128 MMHG | DIASTOLIC BLOOD PRESSURE: 85 MMHG | RESPIRATION RATE: 16 BRPM | HEART RATE: 96 BPM | TEMPERATURE: 97.4 F

## 2022-07-07 DIAGNOSIS — E44.1 MILD PROTEIN-CALORIE MALNUTRITION (HCC): ICD-10-CM

## 2022-07-07 DIAGNOSIS — K52.9 INFLAMMATORY BOWEL DISEASE: ICD-10-CM

## 2022-07-07 DIAGNOSIS — Z79.52 CURRENT CHRONIC USE OF SYSTEMIC STEROIDS: ICD-10-CM

## 2022-07-07 DIAGNOSIS — D75.839 THROMBOCYTOSIS: Primary | ICD-10-CM

## 2022-07-07 DIAGNOSIS — L93.2 DRUG-INDUCED LUPUS ERYTHEMATOSUS: ICD-10-CM

## 2022-07-07 DIAGNOSIS — T50.905A DRUG-INDUCED LUPUS ERYTHEMATOSUS: ICD-10-CM

## 2022-07-07 DIAGNOSIS — R63.0 ANOREXIA: Primary | ICD-10-CM

## 2022-07-07 DIAGNOSIS — R19.7 DIARRHEA, UNSPECIFIED TYPE: ICD-10-CM

## 2022-07-07 DIAGNOSIS — D84.9 IMMUNOSUPPRESSED STATUS (HCC): ICD-10-CM

## 2022-07-07 LAB
GAMMA INTERFERON BACKGROUND BLD IA-ACNC: 0.02 IU/ML
M TB IFN-G BLD-IMP: NEGATIVE
M TB IFN-G CD4+ BCKGRND COR BLD-ACNC: 0 IU/ML
M TB IFN-G CD4+ BCKGRND COR BLD-ACNC: 0.03 IU/ML
MITOGEN IGNF BCKGRD COR BLD-ACNC: 1.74 IU/ML

## 2022-07-07 PROCEDURE — 96360 HYDRATION IV INFUSION INIT: CPT

## 2022-07-07 PROCEDURE — 99214 OFFICE O/P EST MOD 30 MIN: CPT | Performed by: INTERNAL MEDICINE

## 2022-07-07 PROCEDURE — 96361 HYDRATE IV INFUSION ADD-ON: CPT

## 2022-07-07 RX ORDER — SODIUM CHLORIDE, SODIUM LACTATE, POTASSIUM CHLORIDE, CALCIUM CHLORIDE 600; 310; 30; 20 MG/100ML; MG/100ML; MG/100ML; MG/100ML
1500 INJECTION, SOLUTION INTRAVENOUS ONCE
Status: COMPLETED | OUTPATIENT
Start: 2022-07-07 | End: 2022-07-07

## 2022-07-07 RX ORDER — SODIUM CHLORIDE, SODIUM LACTATE, POTASSIUM CHLORIDE, CALCIUM CHLORIDE 600; 310; 30; 20 MG/100ML; MG/100ML; MG/100ML; MG/100ML
1500 INJECTION, SOLUTION INTRAVENOUS ONCE
Status: CANCELLED
Start: 2022-07-08 | End: 2022-07-08

## 2022-07-07 RX ORDER — METHYLPREDNISOLONE SODIUM SUCCINATE 125 MG/2ML
60 INJECTION, POWDER, LYOPHILIZED, FOR SOLUTION INTRAMUSCULAR; INTRAVENOUS ONCE
Status: CANCELLED
Start: 2022-07-11 | End: 2022-07-08

## 2022-07-07 RX ORDER — ALFALFA 650 MG
TABLET ORAL
COMMUNITY

## 2022-07-07 RX ADMIN — SODIUM CHLORIDE, SODIUM LACTATE, POTASSIUM CHLORIDE, AND CALCIUM CHLORIDE 1500 ML: .6; .31; .03; .02 INJECTION, SOLUTION INTRAVENOUS at 09:48

## 2022-07-07 NOTE — PROGRESS NOTES
[facility] - Outpatient Follow-up Note  Consuelo Taylor 50 y o  female MRN: [id number]  Encounter: [id number]          ASSESSMENT AND PLAN:    Consuelo Taylor is a 50 y o  female who presents with complaint of inflammatory bowel disease (indeterminate versus Crohn's versus UC) status post prior TNF therapy but complicated by drug-induced lupus, prior diagnosis of mast cell activation syndrome, who presents for follow-up  Overall doing much better on stelara and Xolair every 21 days and she wants to continue this regimen as it has been working  EGD and colonoscopy from October 2021 grossly normal and biopsies with patchy chronic active left colitis and some quiescent colitis  Prior abdominal ultrasound showed cholecystectomy  MRI/MRCP with no interval progression of mild central given intrahepatic and extrahepatic bile duct prominence  Most recent CMP with total bilirubin 0 19 and slightly elevated glucose but otherwise normal   CBC normal in terms of white blood cell count and hemoglobin but platelets slightly elevated  CRP normal   Hemoglobin A1c 5 4     1  Thrombocytosis    2  Immunosuppressed status ([facility] [address] )    3  Inflammatory bowel disease    4  Mild protein-calorie malnutrition ([facility] [address] )    5  Current chronic use of systemic steroids    6  Drug-induced lupus erythematosus        No orders of the defined types were placed in this encounter  Continue Stelara every 21 days  Continue curcumin  No need for repeat blood work as she recently had blood work  Next QuantiFERON gold July 2023  Continue IV fluids  Continue PPI  She is on a probiotic and MVI  She takes vitamin D and B  Continue to follow up with multiple specialists including Rheumatology, Cardiology, pulmonology, neurology, hematology as needed  ______________________________________________________________________    SUBJECTIVE:    Consuelo Taylor is a 50 y o  female who presents with complaint of IBD here for follow-up       She feels okay  She had rain and  back-up after the last time she was seen, was in the hospital with "neuro-seizures " She had a bad experience in the ED  Stelara and Xolair every 21 days  She has been doing well from a GI perspective  She had a few down days in [place] ([facility]) but overall better  Better when she got home  She stopped the temazepam  Off the prednisone (expect prior to Stelara, Zolair and solumedrol prior to IVIG)  She is using 20mg of prednisone  Energy wise is good  2 BMs per day (in the morning), mostly formed (but something she eats will cause a periodic burning and greasiness)  She avoids strawberries, cucumbers (salad does not work for her)  No bright red blood per rectum/rectal bleeding, no melena, some urgency a few weeks ago but better after the 3rd dose on stelara q21 days, no significant nocturnal BMs (unless she eats late), no incontinence recently  No abdominal pain  No pain with a BM  Occasional lower pelvic pain (none in the past 4 weeks)  She took budesonide only twice for upper GI symptoms  no rashes, possible mouth sore (but she thinks she bit her tongue), some joint pains  Her feet will feel painful when she stops at night    REVIEW OF SYSTEMS IS OTHERWISE NEGATIVE    10 point ROS reviewed and negative, except as above      Historical Information   Past Medical History:   Diagnosis Date    Anxiety     Asthma     Chronic kidney disease     Crohn's colitis ([facility] [address] )     Deep vein thrombosis (HCC)     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    Generalized anxiety disorder 08/26/2017    Heart murmur     Mast cell disease     Migraine     Myocardial infarction [facility])     NSTEMI  pt denies, documented in cardio note    Pancreatitis     sphinger of     Psychiatric disorder     RA (rheumatoid arthritis) ([facility] [address] )     Ulcerative colitis ([facility] [address] )      Past Surgical History:   Procedure Laterality Date    APPENDECTOMY      CHOLECYSTECTOMY      EGD AND COLONOSCOPY N/A 9/12/2017 Procedure: EGD AND COLONOSCOPY;  Surgeon: Tony Mccullough MD;  [facility]: [facility]; Service: Gastroenterology    ERCP N/A 9/15/2017    Procedure: ENDOSCOPIC RETROGRADE CHOLANGIOPANCREATOGRAPHY (ERCP); Surgeon: Shari Aponte MD;  [facility];   Service: Gastroenterology    HYSTERECTOMY      KNEE SURGERY Right     LAPAROSCOPIC ENDOMETRIOSIS FULGURATION      ORIF ANKLE FRACTURE Left     VT KNEE SCOPE,MED/LAT MENISECTOMY Left 5/12/2017    Procedure: POSSIBLE MEDIAL MENISECTOMY;  Surgeon: Melida Ferrera MD;  [facility]: [facility];  Service: Orthopedics    VT KNEE [address] CART Left 5/12/2017    Procedure: KNEE ARTHROSCOPY EVALUATION, CHONDROPLASTY;  Surgeon: Melida Ferrera MD;  [facility];  Service: Orthopedics    VT LAP,DIAGNOSTIC ABDOMEN N/A 12/12/2017    Procedure: LAPAROSCOPY DIAGNOSTIC  WITH LYSIS OF ADHESIONS;  Surgeon: Reji Hester DO;  [facility];  Service: General     Social History   Social History     Substance and Sexual Activity   Alcohol Use Never     Social History     Substance and Sexual Activity   Drug Use Not Currently     Social History     Tobacco Use   Smoking Status Never Smoker   Smokeless Tobacco Never Used     Family History   Problem Relation Age of Onset    Ulcerative colitis Mother     Heart failure Father     Neuropathy Father     Macular degeneration Father     Diabetes Father     Asthma Daughter     Hypothyroidism Daughter     Heart disease Maternal Grandmother     Arthritis Maternal Grandmother     Arthritis Paternal Grandmother     Macular degeneration Paternal Grandmother     Lymphoma Paternal Grandfather     Heart failure Paternal Aunt     Heart failure Paternal [de-identified]     Breast cancer Paternal Aunt 53    Breast cancer additional onset Paternal Aunt 62    Hypothyroidism Paternal Aunt     Breast cancer Maternal Aunt     No Known Problems Maternal Grandfather        Meds/Allergies       Current Outpatient Medications:    Alfalfa 650 MG TABS    B-D 3CC LUER-SHAMEKA SYR 25GX1" 25G X 1" 3 ML MISC    butalbital-acetaminophen-caffeine (FIORICET,ESGIC) -40 mg per tablet    cetirizine (ZyrTEC) 10 mg tablet    cyanocobalamin 1,000 mcg/mL    diphenhydrAMINE (BENADRYL) 25 mg tablet    EPINEPHrine (EPIPEN 2-MARYSOL IJ)    ergocalciferol (VITAMIN D2) 50,000 units    famotidine (PEPCID) 40 MG tablet    hydrOXYzine HCL (ATARAX) 25 mg tablet    Lactobacillus (PROBIOTIC ACIDOPHILUS PO)    liothyronine (CYTOMEL) 5 mcg tablet    LORazepam (ATIVAN) 1 mg tablet    montelukast (SINGULAIR) 10 mg tablet    nystatin (MYCOSTATIN) 500,000 units/5 mL suspension    omalizumab (XOLAIR) 150 mg    ondansetron (ZOFRAN-ODT) 4 mg disintegrating tablet    pantoprazole (PROTONIX) 40 mg tablet    Stelara 90 MG/ML subcutaneous injection    Synthroid 75 MCG tablet    TechLite Lancets MISC    diclofenac (VOLTAREN) 75 mg EC tablet    levalbuterol (XOPENEX HFA) 45 mcg/act inhaler    NON FORMULARY    Riboflavin (Vitamin B-2) 100 MG TABS    simethicone (MYLICON) 124 MG chewable tablet  No current facility-administered medications for this visit      Allergies   Allergen Reactions    Amitriptyline Anaphylaxis     According to the patient she had nphylaxis    Aspergillus Fumigatus Other (See Comments), Hives and Swelling    Azathioprine Other (See Comments) and Anaphylaxis     pancreatitis  According to patient she needed Epipen    Buspar [Buspirone] Hives and Shortness Of Breath    Corn Dextrin Anaphylaxis     Any corn based products    Eggs Or Egg-Derived Products - Food Allergy Anaphylaxis    Gelatin - Food Allergy Anaphylaxis    Heparin Hives     Painful welts     Lactated Ringers Shortness Of Breath     Pt questions this allergy, pt has received LR multiple times without reaction    Malto Dextrin [Dextrin] Anaphylaxis    Other Anaphylaxis     Gel caps, maltodextrose, dextrose,grapes    Penicillium Notatum Shortness Of Breath and Swelling  Sumatriptan Anaphylaxis     Bradycardia, sob, back pressure, head pain,throat tightness  According to the patient she had anphylaxis    Contrast [Iodinated Diagnostic Agents] Other (See Comments)     Pt states needs to be premedicated prior to injection   [employer] - Food Allergy - Food Allergy Hives    Humira [Adalimumab] Other (See Comments)     "I develop drug induced lupus"    Ibuprofen Hives and Throat Swelling    Polyethylene Glycol Diarrhea and Vomiting    Red Dye - Food Allergy Other (See Comments)     intolerance    Remicade [Infliximab] Other (See Comments)     "I develop drug induced lupus"    Sulfa Antibiotics Hives and Other (See Comments)    Sulfate Hives    Sulfites - Food Allergy Hives    Chlorhexidine Itching    Histamine Hives, Rash and Other (See Comments)     Intolerance             Objective     Blood pressure 124/76, pulse 102, weight 71 1 kg (156 lb 12 8 oz), SpO2 98 %, not currently breastfeeding  Body mass index is 25 89 kg/m²  PHYSICAL EXAMINATION:    General Appearance:   Alert, cooperative, no distress   HEENT:  Normocephalic, atraumatic, anicteric  Neck supple, symmetrical, trachea midline  Lungs:   Equal chest rise and unlabored breathing, normal effort, no coughing  Cardiovascular:   No visualized JVD  Abdomen:   No abdominal distension  Skin:   No jaundice, rashes, or lesions  Musculoskeletal:   Normal range of motion visualized  Psych:  Normal affect and normal insight  Neuro:  Alert and appropriate  Lab Results:   No visits with results within 1 Day(s) from this visit     Latest known visit with results is:   Appointment on 07/05/2022   Component Date Value    WBC 07/05/2022 7 15     RBC 07/05/2022 4 62     Hemoglobin 07/05/2022 13 4     Hematocrit 07/05/2022 40 9     MCV 07/05/2022 89     MCH 07/05/2022 29 0     MCHC 07/05/2022 32 8     RDW 07/05/2022 15 2 (A)    MPV 07/05/2022 8 8 (A)    Platelets 77/25/5453 522 (A)    nRBC 07/05/2022 0     Neutrophils Relative 07/05/2022 92 (A)    Immat GRANS % 07/05/2022 0     Lymphocytes Relative 07/05/2022 7 (A)    Monocytes Relative 07/05/2022 0 (A)    Eosinophils Relative 07/05/2022 0     Basophils Relative 07/05/2022 1     Neutrophils Absolute 07/05/2022 6 55     Immature Grans Absolute 07/05/2022 0 03     Lymphocytes Absolute 07/05/2022 0 50 (A)    Monocytes Absolute 07/05/2022 0 03 (A)    Eosinophils Absolute 07/05/2022 0 00     Basophils Absolute 07/05/2022 0 04     Sodium 07/05/2022 136     Potassium 07/05/2022 3 9     Chloride 07/05/2022 102     CO2 07/05/2022 23     ANION GAP 07/05/2022 11     BUN 07/05/2022 7     Creatinine 07/05/2022 0 89     Glucose 07/05/2022 168 (A)    Calcium 07/05/2022 8 4     AST 07/05/2022 13     ALT 07/05/2022 26     Alkaline Phosphatase 07/05/2022 79     Total Protein 07/05/2022 7 6     Albumin 07/05/2022 3 5     Total Bilirubin 07/05/2022 0 19 (A)    eGFR 07/05/2022 76     CRP 07/05/2022 1 4     QFT Nil 07/05/2022 0 02     QFT TB1-NIL 07/05/2022 0 03     QFT TB2-NIL 07/05/2022 0 00     QFT Mitogen-NIL 07/05/2022 1 74     QFT Final Interpretation 07/05/2022 Negative        Lab Results   Component Value Date    WBC 7 15 07/05/2022    HGB 13 4 07/05/2022    HCT 40 9 07/05/2022    MCV 89 07/05/2022     (H) 07/05/2022       Lab Results   Component Value Date     01/16/2015    SODIUM 136 07/05/2022    K 3 9 07/05/2022     07/05/2022    CO2 23 07/05/2022    ANIONGAP 12 01/16/2015    AGAP 11 07/05/2022    BUN 7 07/05/2022    CREATININE 0 89 07/05/2022    GLUC 168 (H) 07/05/2022    GLUF 79 03/23/2022    CALCIUM 8 4 07/05/2022    AST 13 07/05/2022    ALT 26 07/05/2022    ALKPHOS 79 07/05/2022    PROT 7 7 01/16/2015    TP 7 6 07/05/2022    BILITOT 0 7 01/16/2015    TBILI 0 19 (L) 07/05/2022    EGFR 76 07/05/2022       Lab Results   Component Value Date    CRP 1 4 07/05/2022       Lab Results   Component Value Date EJA3DLJPRMET 0 586 03/04/2022       Lab Results   Component Value Date    IRON 190 (H) 11/01/2019    TIBC 355 11/01/2019    FERRITIN 42 11/09/2020       Radiology Results:   No results found

## 2022-07-08 LAB — TRYPTASE SERPL-MCNC: 11.7 UG/L (ref 2.2–13.2)

## 2022-07-11 ENCOUNTER — HOSPITAL ENCOUNTER (OUTPATIENT)
Dept: INFUSION CENTER | Facility: CLINIC | Age: 47
End: 2022-07-11

## 2022-07-14 ENCOUNTER — HOSPITAL ENCOUNTER (OUTPATIENT)
Dept: INFUSION CENTER | Facility: CLINIC | Age: 47
Discharge: HOME | End: 2022-07-14

## 2022-07-15 ENCOUNTER — DOCUMENTATION (OUTPATIENT)
Dept: GYNECOLOGY | Facility: CLINIC | Age: 47
End: 2022-07-15

## 2022-07-18 ENCOUNTER — HOSPITAL ENCOUNTER (OUTPATIENT)
Dept: INFUSION CENTER | Facility: CLINIC | Age: 47
Discharge: HOME | End: 2022-07-18
Payer: COMMERCIAL

## 2022-07-18 ENCOUNTER — TELEPHONE (OUTPATIENT)
Dept: GASTROENTEROLOGY | Facility: CLINIC | Age: 47
End: 2022-07-18

## 2022-07-18 VITALS
SYSTOLIC BLOOD PRESSURE: 123 MMHG | RESPIRATION RATE: 18 BRPM | TEMPERATURE: 98.2 F | HEART RATE: 88 BPM | DIASTOLIC BLOOD PRESSURE: 84 MMHG

## 2022-07-18 DIAGNOSIS — E44.1 MILD PROTEIN-CALORIE MALNUTRITION (HCC): ICD-10-CM

## 2022-07-18 DIAGNOSIS — R19.7 DIARRHEA, UNSPECIFIED TYPE: ICD-10-CM

## 2022-07-18 DIAGNOSIS — R63.0 ANOREXIA: Primary | ICD-10-CM

## 2022-07-18 PROCEDURE — 96375 TX/PRO/DX INJ NEW DRUG ADDON: CPT

## 2022-07-18 PROCEDURE — 96361 HYDRATE IV INFUSION ADD-ON: CPT

## 2022-07-18 PROCEDURE — 96365 THER/PROPH/DIAG IV INF INIT: CPT

## 2022-07-18 RX ORDER — METHYLPREDNISOLONE SODIUM SUCCINATE 125 MG/2ML
60 INJECTION, POWDER, LYOPHILIZED, FOR SOLUTION INTRAMUSCULAR; INTRAVENOUS ONCE
Status: COMPLETED | OUTPATIENT
Start: 2022-07-18 | End: 2022-07-18

## 2022-07-18 RX ORDER — SODIUM CHLORIDE, SODIUM LACTATE, POTASSIUM CHLORIDE, CALCIUM CHLORIDE 600; 310; 30; 20 MG/100ML; MG/100ML; MG/100ML; MG/100ML
1500 INJECTION, SOLUTION INTRAVENOUS ONCE
Status: CANCELLED
Start: 2022-07-21 | End: 2022-07-19

## 2022-07-18 RX ORDER — METHYLPREDNISOLONE SODIUM SUCCINATE 125 MG/2ML
60 INJECTION, POWDER, LYOPHILIZED, FOR SOLUTION INTRAMUSCULAR; INTRAVENOUS ONCE
Status: CANCELLED
Start: 2022-07-25 | End: 2022-07-19

## 2022-07-18 RX ORDER — SODIUM CHLORIDE, SODIUM LACTATE, POTASSIUM CHLORIDE, CALCIUM CHLORIDE 600; 310; 30; 20 MG/100ML; MG/100ML; MG/100ML; MG/100ML
1500 INJECTION, SOLUTION INTRAVENOUS ONCE
Status: COMPLETED | OUTPATIENT
Start: 2022-07-18 | End: 2022-07-18

## 2022-07-18 RX ADMIN — DIPHENHYDRAMINE HYDROCHLORIDE 50 MG: 50 INJECTION, SOLUTION INTRAMUSCULAR; INTRAVENOUS at 11:17

## 2022-07-18 RX ADMIN — SODIUM CHLORIDE, SODIUM LACTATE, POTASSIUM CHLORIDE, AND CALCIUM CHLORIDE 1500 ML: .6; .31; .03; .02 INJECTION, SOLUTION INTRAVENOUS at 12:00

## 2022-07-18 RX ADMIN — METHYLPREDNISOLONE SODIUM SUCCINATE 60 MG: 125 INJECTION, POWDER, FOR SOLUTION INTRAMUSCULAR; INTRAVENOUS at 11:45

## 2022-07-18 NOTE — PROGRESS NOTES
Patient arrived to unit without complaint  Patient tolerated pre medications for self injected IVIG and IV hydration as ordered without incident  AVS declined and patient aware of next appointment  Patient left in stable condition

## 2022-07-18 NOTE — TELEPHONE ENCOUNTER
Patients GI provider:  Dr Michelle Kathleen    Number to return call: [phone]     Reason for call: Pt calling returning call from [facility]  Pt not sure what call was regarding  Pt can be reached at above number      Scheduled procedure/appointment date if applicable: Appt: 43/27/9108

## 2022-07-20 DIAGNOSIS — G43.809 OTHER MIGRAINE WITHOUT STATUS MIGRAINOSUS, NOT INTRACTABLE: ICD-10-CM

## 2022-07-20 RX ORDER — BUTALBITAL, ACETAMINOPHEN AND CAFFEINE 50; 325; 40 MG/1; MG/1; MG/1
1 TABLET ORAL EVERY 8 HOURS PRN
Qty: 20 TABLET | Refills: 0 | Status: SHIPPED | OUTPATIENT
Start: 2022-07-20 | End: 2022-10-03 | Stop reason: SDUPTHER

## 2022-07-21 ENCOUNTER — HOSPITAL ENCOUNTER (OUTPATIENT)
Dept: INFUSION CENTER | Facility: CLINIC | Age: 47
Discharge: HOME | End: 2022-07-21

## 2022-07-22 ENCOUNTER — HOSPITAL ENCOUNTER (OUTPATIENT)
Dept: INFUSION CENTER | Facility: CLINIC | Age: 47
End: 2022-07-22

## 2022-07-25 ENCOUNTER — HOSPITAL ENCOUNTER (OUTPATIENT)
Dept: INFUSION CENTER | Facility: CLINIC | Age: 47
End: 2022-07-25

## 2022-07-28 ENCOUNTER — HOSPITAL ENCOUNTER (OUTPATIENT)
Dept: INFUSION CENTER | Facility: CLINIC | Age: 47
Discharge: HOME | End: 2022-07-28
Payer: COMMERCIAL

## 2022-07-28 DIAGNOSIS — K50.119 CROHN'S DISEASE OF COLON WITH COMPLICATION (HCC): Primary | ICD-10-CM

## 2022-07-28 DIAGNOSIS — E44.1 MILD PROTEIN-CALORIE MALNUTRITION (HCC): ICD-10-CM

## 2022-07-28 DIAGNOSIS — R19.7 DIARRHEA, UNSPECIFIED TYPE: ICD-10-CM

## 2022-07-28 DIAGNOSIS — R63.0 ANOREXIA: Primary | ICD-10-CM

## 2022-07-28 PROCEDURE — 96361 HYDRATE IV INFUSION ADD-ON: CPT

## 2022-07-28 PROCEDURE — 96375 TX/PRO/DX INJ NEW DRUG ADDON: CPT

## 2022-07-28 PROCEDURE — 96365 THER/PROPH/DIAG IV INF INIT: CPT

## 2022-07-28 RX ORDER — METHYLPREDNISOLONE SODIUM SUCCINATE 125 MG/2ML
60 INJECTION, POWDER, LYOPHILIZED, FOR SOLUTION INTRAMUSCULAR; INTRAVENOUS ONCE
Status: COMPLETED | OUTPATIENT
Start: 2022-07-28 | End: 2022-07-28

## 2022-07-28 RX ORDER — METHYLPREDNISOLONE SODIUM SUCCINATE 125 MG/2ML
60 INJECTION, POWDER, LYOPHILIZED, FOR SOLUTION INTRAMUSCULAR; INTRAVENOUS ONCE
Status: CANCELLED
Start: 2022-08-01 | End: 2022-07-30

## 2022-07-28 RX ORDER — SODIUM CHLORIDE, SODIUM LACTATE, POTASSIUM CHLORIDE, CALCIUM CHLORIDE 600; 310; 30; 20 MG/100ML; MG/100ML; MG/100ML; MG/100ML
1500 INJECTION, SOLUTION INTRAVENOUS ONCE
Status: COMPLETED | OUTPATIENT
Start: 2022-07-28 | End: 2022-07-28

## 2022-07-28 RX ORDER — SODIUM CHLORIDE, SODIUM LACTATE, POTASSIUM CHLORIDE, CALCIUM CHLORIDE 600; 310; 30; 20 MG/100ML; MG/100ML; MG/100ML; MG/100ML
1500 INJECTION, SOLUTION INTRAVENOUS ONCE
Status: CANCELLED
Start: 2022-07-30 | End: 2022-07-30

## 2022-07-28 RX ADMIN — SODIUM CHLORIDE, SODIUM LACTATE, POTASSIUM CHLORIDE, AND CALCIUM CHLORIDE 1500 ML: .6; .31; .03; .02 INJECTION, SOLUTION INTRAVENOUS at 12:51

## 2022-07-28 RX ADMIN — METHYLPREDNISOLONE SODIUM SUCCINATE 60 MG: 125 INJECTION, POWDER, FOR SOLUTION INTRAMUSCULAR; INTRAVENOUS at 12:38

## 2022-07-28 RX ADMIN — DIPHENHYDRAMINE HYDROCHLORIDE 50 MG: 50 INJECTION, SOLUTION INTRAMUSCULAR; INTRAVENOUS at 12:15

## 2022-07-28 NOTE — PROGRESS NOTES
Pt presents for LR  No new meds or concerns  Pt brought a lab slip with her and stated she was told that must be collected prior to pre-meds, but it is not a lab that can be collected at the infusion center  Pt went to the lab on [address] and was told it isn't a lab that can be collected outpatient  Pt is to call her physician's office to obtain further instruction for the specific lab  Pt returned to the infusion center for LR infusion  Pt was tearful initially  Therapeutic conversation offered  Pt was able to begin treatment and was able to relax during infusion  Pt tolerated treatment without adverse reaction  Future visits discussed  AVS declined

## 2022-07-29 ENCOUNTER — DOCTOR'S OFFICE (OUTPATIENT)
Dept: URBAN - NONMETROPOLITAN AREA CLINIC 1 | Facility: CLINIC | Age: 47
Setting detail: OPHTHALMOLOGY
End: 2022-07-29
Payer: COMMERCIAL

## 2022-07-29 VITALS — HEIGHT: 55 IN

## 2022-07-29 DIAGNOSIS — H46.8: ICD-10-CM

## 2022-07-29 DIAGNOSIS — H43.813: ICD-10-CM

## 2022-07-29 DIAGNOSIS — H40.003: ICD-10-CM

## 2022-07-29 DIAGNOSIS — H25.13: ICD-10-CM

## 2022-07-29 PROCEDURE — 92202 OPSCPY EXTND ON/MAC DRAW: CPT | Performed by: OPHTHALMOLOGY

## 2022-07-29 PROCEDURE — 99214 OFFICE O/P EST MOD 30 MIN: CPT | Performed by: OPHTHALMOLOGY

## 2022-07-29 PROCEDURE — 92134 CPTRZ OPH DX IMG PST SGM RTA: CPT | Performed by: OPHTHALMOLOGY

## 2022-07-29 ASSESSMENT — CONFRONTATIONAL VISUAL FIELD TEST (CVF)
OD_FINDINGS: FULL
OS_FINDINGS: FULL

## 2022-07-29 ASSESSMENT — VISUAL ACUITY
OD_BCVA: 20/20-2
OS_BCVA: 20/25-1

## 2022-08-01 ENCOUNTER — TELEPHONE (OUTPATIENT)
Dept: GASTROENTEROLOGY | Facility: MEDICAL CENTER | Age: 47
End: 2022-08-01

## 2022-08-01 ENCOUNTER — HOSPITAL ENCOUNTER (OUTPATIENT)
Dept: INFUSION CENTER | Facility: CLINIC | Age: 47
Discharge: HOME | End: 2022-08-01
Payer: COMMERCIAL

## 2022-08-01 VITALS
RESPIRATION RATE: 18 BRPM | HEART RATE: 94 BPM | SYSTOLIC BLOOD PRESSURE: 107 MMHG | DIASTOLIC BLOOD PRESSURE: 73 MMHG | TEMPERATURE: 97.1 F

## 2022-08-01 DIAGNOSIS — E44.1 MILD PROTEIN-CALORIE MALNUTRITION (HCC): ICD-10-CM

## 2022-08-01 DIAGNOSIS — R19.7 DIARRHEA, UNSPECIFIED TYPE: ICD-10-CM

## 2022-08-01 DIAGNOSIS — R63.0 ANOREXIA: Primary | ICD-10-CM

## 2022-08-01 PROCEDURE — 96365 THER/PROPH/DIAG IV INF INIT: CPT

## 2022-08-01 PROCEDURE — 96361 HYDRATE IV INFUSION ADD-ON: CPT

## 2022-08-01 PROCEDURE — 96375 TX/PRO/DX INJ NEW DRUG ADDON: CPT

## 2022-08-01 RX ORDER — SODIUM CHLORIDE, SODIUM LACTATE, POTASSIUM CHLORIDE, CALCIUM CHLORIDE 600; 310; 30; 20 MG/100ML; MG/100ML; MG/100ML; MG/100ML
1500 INJECTION, SOLUTION INTRAVENOUS ONCE
Status: COMPLETED | OUTPATIENT
Start: 2022-08-01 | End: 2022-08-01

## 2022-08-01 RX ORDER — SODIUM CHLORIDE, SODIUM LACTATE, POTASSIUM CHLORIDE, CALCIUM CHLORIDE 600; 310; 30; 20 MG/100ML; MG/100ML; MG/100ML; MG/100ML
1500 INJECTION, SOLUTION INTRAVENOUS ONCE
Status: CANCELLED
Start: 2022-08-04 | End: 2022-08-03

## 2022-08-01 RX ORDER — METHYLPREDNISOLONE SODIUM SUCCINATE 125 MG/2ML
60 INJECTION, POWDER, LYOPHILIZED, FOR SOLUTION INTRAMUSCULAR; INTRAVENOUS ONCE
Status: CANCELLED
Start: 2022-08-08 | End: 2022-08-03

## 2022-08-01 RX ORDER — METHYLPREDNISOLONE SODIUM SUCCINATE 125 MG/2ML
60 INJECTION, POWDER, LYOPHILIZED, FOR SOLUTION INTRAMUSCULAR; INTRAVENOUS ONCE
Status: COMPLETED | OUTPATIENT
Start: 2022-08-01 | End: 2022-08-01

## 2022-08-01 RX ADMIN — DIPHENHYDRAMINE HYDROCHLORIDE 50 MG: 50 INJECTION, SOLUTION INTRAMUSCULAR; INTRAVENOUS at 11:24

## 2022-08-01 RX ADMIN — METHYLPREDNISOLONE SODIUM SUCCINATE 60 MG: 125 INJECTION, POWDER, FOR SOLUTION INTRAMUSCULAR; INTRAVENOUS at 12:22

## 2022-08-01 RX ADMIN — SODIUM CHLORIDE, SODIUM LACTATE, POTASSIUM CHLORIDE, AND CALCIUM CHLORIDE 1500 ML: .6; .31; .03; .02 INJECTION, SOLUTION INTRAVENOUS at 12:33

## 2022-08-01 NOTE — TELEPHONE ENCOUNTER
----- Message from Hemalatha Holm sent at 8/1/2022  1:21 PM EDT -----  Regarding: FW: Arlon Spatz lab orders needed     ----- Message -----  From: Ayanna Sexton  Sent: 8/1/2022   1:09 PM EDT  To: [facility]  Subject: Arlon Spatz lab orders needed                     Good afternoon Dr Gonzalez Read i'll start with good news    I had a great day Saturday and was able to enjoy my 32th wedding anniversary with my   Onto to not so great news    I am due for my Stelara today and its not coming  Its been a frustrating process ordering it every month/every 21 days  Thursday they said i had no insurance authorization, today they said they will not ship until i pay $[phone] because i have no more insurance or copay assistance benefits available  I certainly cannot afford that now or every 21 days, so looks like we have another curve to navigate/figure out  In addition, i only have about (10)- 20 mg prednisone tablets available  Can you please send in a prescription for the 20mg prednisone tablets and advise what to do about this Stelara?     Thank you   Demetrius Licea

## 2022-08-01 NOTE — TELEPHONE ENCOUNTER
Called and spoke at length with patient  She stated she consistently has issues with [facility] and getting her medication shipped  She stated they are telling her she used all of her copay assistance funds  I tried to explain to patient that this is possible since she is receiving the medication more frequently than the standard time but she stated now they are not billing her [employer]  I did inquire about secondary insurance, which is Medicare  She only has Part A and B, so no pharmacy benefits  Which means they are not helping with coverage of the [facility]  She was asking if medication can be sent to another pharmacy, but I explained Paulino Monge is contracted to fill specialty medication with [facility]  I offered to call and see what the problem is but she stated she is just tired of them and the constant issues  She mentioned she is tired and having to do the injections herself and it is "mentally draining"  She inquired about getting this done at an infusion center and having it go through her Medical Benefits  I mentioned [facility] could be an option that would be able to help  I will email Jose Eilas Guillermo from [facility] and will follow up with patient after I receive an update

## 2022-08-01 NOTE — TELEPHONE ENCOUNTER
Hi,    She is having issues with her Stelara and the cost  Can you look into this and see if there is anything we can do? Thank you!

## 2022-08-01 NOTE — PROGRESS NOTES
Pt presents for LR infusion  No new meds or concerns  Pt tolerated infusion without adverse reaction  Future visits discussed  AVS declined

## 2022-08-02 ENCOUNTER — PATIENT MESSAGE (OUTPATIENT)
Dept: GASTROENTEROLOGY | Facility: MEDICAL CENTER | Age: 47
End: 2022-08-02

## 2022-08-02 NOTE — TELEPHONE ENCOUNTER
Dr Levine - please see below  I emailed you a form that I need reviewed, signed, and emailed back to me       Thank you!!

## 2022-08-02 NOTE — TELEPHONE ENCOUNTER
Christinet sent to patient to update her         Information sent to [facility]

## 2022-08-04 ENCOUNTER — TELEPHONE (OUTPATIENT)
Dept: GASTROENTEROLOGY | Facility: CLINIC | Age: 47
End: 2022-08-04

## 2022-08-04 ENCOUNTER — TELEPHONE (OUTPATIENT)
Dept: GASTROENTEROLOGY | Facility: MEDICAL CENTER | Age: 47
End: 2022-08-04

## 2022-08-04 NOTE — TELEPHONE ENCOUNTER
see pt message thread from 7/28 for more detail  ACTH was put in under labs to be drawn in the AM but [facility] is saying that this has to be put in through [facility] for them to view it on their end  Pt is telling her that she has to have this done tomorrow morning

## 2022-08-04 NOTE — TELEPHONE ENCOUNTER
Patients GI provider:  Dr Irma Russell    Number to return call: [phone]    Reason for call: Siobhan Carthage from [facility] called stating that a order needs to be placed into [facility] for the ACTH stimulation test for her to see the order  Pt is there and upset because it has to be completed by tomorrow morning  Above is Johny number if you have any questions       Scheduled procedure/appointment date if applicable: Appt  07/09/69

## 2022-08-04 NOTE — TELEPHONE ENCOUNTER
Duplicate encounter  This message sent to Dr Solange Burden and Task AP; Please place order for ACTH stimulation test into [facility]  there is a pt message thread from 7/28 for more detail  ACTH was put in under labs to be drawn in the AM but she is saying that this has to be put in through [facility] for them to view it on their end  pt is telling her that she has to have this done tomorrow morning and they leave in an hour

## 2022-08-04 NOTE — TELEPHONE ENCOUNTER
Spoke with patient  She was asking about her stimulating adrenal testing  I advised her that the nurses are working on figuring this out for her and will reach out asap  Also advised that [facility] is aware this is urgent and will up date me once this is figured out

## 2022-08-05 ENCOUNTER — HOSPITAL ENCOUNTER (OUTPATIENT)
Dept: INFUSION CENTER | Facility: HOSPITAL | Age: 47
Discharge: HOME | End: 2022-08-05
Payer: COMMERCIAL

## 2022-08-05 VITALS
SYSTOLIC BLOOD PRESSURE: 113 MMHG | DIASTOLIC BLOOD PRESSURE: 78 MMHG | OXYGEN SATURATION: 98 % | HEART RATE: 99 BPM | TEMPERATURE: 98 F | RESPIRATION RATE: 17 BRPM

## 2022-08-05 DIAGNOSIS — Z79.52 CURRENT CHRONIC USE OF SYSTEMIC STEROIDS: Primary | ICD-10-CM

## 2022-08-05 DIAGNOSIS — R63.0 ANOREXIA: ICD-10-CM

## 2022-08-05 DIAGNOSIS — R19.7 DIARRHEA, UNSPECIFIED TYPE: ICD-10-CM

## 2022-08-05 DIAGNOSIS — E44.1 MILD PROTEIN-CALORIE MALNUTRITION (HCC): ICD-10-CM

## 2022-08-05 PROBLEM — D84.821 IMMUNOSUPPRESSION DUE TO CHRONIC STEROID USE (HCC): Status: ACTIVE | Noted: 2022-08-05

## 2022-08-05 PROBLEM — T38.0X5A IMMUNOSUPPRESSION DUE TO CHRONIC STEROID USE (HCC): Status: ACTIVE | Noted: 2022-08-05

## 2022-08-05 LAB
CORTIS SERPL-MCNC: 13.4 UG/DL
CORTIS SERPL-MCNC: 22.7 UG/DL
CORTIS SERPL-MCNC: 26.6 UG/DL

## 2022-08-05 PROCEDURE — 96361 HYDRATE IV INFUSION ADD-ON: CPT

## 2022-08-05 PROCEDURE — 82024 ASSAY OF ACTH: CPT | Performed by: INTERNAL MEDICINE

## 2022-08-05 PROCEDURE — 96374 THER/PROPH/DIAG INJ IV PUSH: CPT

## 2022-08-05 PROCEDURE — 82533 TOTAL CORTISOL: CPT | Performed by: INTERNAL MEDICINE

## 2022-08-05 RX ORDER — COSYNTROPIN 0.25 MG/ML
0.25 INJECTION, POWDER, FOR SOLUTION INTRAMUSCULAR; INTRAVENOUS ONCE
Status: CANCELLED | OUTPATIENT
Start: 2022-08-05

## 2022-08-05 RX ORDER — METHYLPREDNISOLONE SODIUM SUCCINATE 125 MG/2ML
60 INJECTION, POWDER, LYOPHILIZED, FOR SOLUTION INTRAMUSCULAR; INTRAVENOUS ONCE
Status: CANCELLED
Start: 2022-08-07 | End: 2022-08-07

## 2022-08-05 RX ORDER — SODIUM CHLORIDE, SODIUM LACTATE, POTASSIUM CHLORIDE, CALCIUM CHLORIDE 600; 310; 30; 20 MG/100ML; MG/100ML; MG/100ML; MG/100ML
1500 INJECTION, SOLUTION INTRAVENOUS ONCE
Status: COMPLETED | OUTPATIENT
Start: 2022-08-05 | End: 2022-08-05

## 2022-08-05 RX ORDER — SODIUM CHLORIDE, SODIUM LACTATE, POTASSIUM CHLORIDE, CALCIUM CHLORIDE 600; 310; 30; 20 MG/100ML; MG/100ML; MG/100ML; MG/100ML
1500 INJECTION, SOLUTION INTRAVENOUS ONCE
Status: CANCELLED
Start: 2022-08-07 | End: 2022-08-07

## 2022-08-05 RX ORDER — COSYNTROPIN 0.25 MG/ML
0.25 INJECTION, POWDER, FOR SOLUTION INTRAMUSCULAR; INTRAVENOUS ONCE
Status: COMPLETED | OUTPATIENT
Start: 2022-08-05 | End: 2022-08-05

## 2022-08-05 RX ADMIN — SODIUM CHLORIDE, SODIUM LACTATE, POTASSIUM CHLORIDE, AND CALCIUM CHLORIDE 1500 ML: .6; .31; .03; .02 INJECTION, SOLUTION INTRAVENOUS at 10:35

## 2022-08-05 RX ADMIN — COSYNTROPIN 0.25 MG: 0.25 INJECTION, POWDER, LYOPHILIZED, FOR SOLUTION INTRAMUSCULAR; INTRAVENOUS at 09:16

## 2022-08-06 LAB — ACTH PLAS-MCNC: 8.7 PG/ML (ref 7.2–63.3)

## 2022-08-08 ENCOUNTER — HOSPITAL ENCOUNTER (OUTPATIENT)
Dept: INFUSION CENTER | Facility: CLINIC | Age: 47
Discharge: HOME | End: 2022-08-08
Payer: COMMERCIAL

## 2022-08-08 VITALS
DIASTOLIC BLOOD PRESSURE: 78 MMHG | SYSTOLIC BLOOD PRESSURE: 108 MMHG | RESPIRATION RATE: 16 BRPM | TEMPERATURE: 97.8 F | HEART RATE: 109 BPM

## 2022-08-08 DIAGNOSIS — E44.1 MILD PROTEIN-CALORIE MALNUTRITION (HCC): ICD-10-CM

## 2022-08-08 DIAGNOSIS — R63.0 ANOREXIA: Primary | ICD-10-CM

## 2022-08-08 DIAGNOSIS — K50.119 CROHN'S DISEASE OF COLON WITH COMPLICATION (HCC): Primary | ICD-10-CM

## 2022-08-08 DIAGNOSIS — R19.7 DIARRHEA, UNSPECIFIED TYPE: ICD-10-CM

## 2022-08-08 PROCEDURE — 96375 TX/PRO/DX INJ NEW DRUG ADDON: CPT

## 2022-08-08 PROCEDURE — 96361 HYDRATE IV INFUSION ADD-ON: CPT

## 2022-08-08 PROCEDURE — 96365 THER/PROPH/DIAG IV INF INIT: CPT

## 2022-08-08 RX ORDER — PREDNISONE 20 MG/1
TABLET ORAL
Qty: 100 TABLET | Refills: 0 | Status: SHIPPED | OUTPATIENT
Start: 2022-08-08

## 2022-08-08 RX ORDER — METHYLPREDNISOLONE SODIUM SUCCINATE 125 MG/2ML
60 INJECTION, POWDER, LYOPHILIZED, FOR SOLUTION INTRAMUSCULAR; INTRAVENOUS ONCE
Status: CANCELLED
Start: 2022-08-15 | End: 2022-08-11

## 2022-08-08 RX ORDER — SODIUM CHLORIDE, SODIUM LACTATE, POTASSIUM CHLORIDE, CALCIUM CHLORIDE 600; 310; 30; 20 MG/100ML; MG/100ML; MG/100ML; MG/100ML
1500 INJECTION, SOLUTION INTRAVENOUS ONCE
Status: COMPLETED | OUTPATIENT
Start: 2022-08-08 | End: 2022-08-08

## 2022-08-08 RX ORDER — SODIUM CHLORIDE, SODIUM LACTATE, POTASSIUM CHLORIDE, CALCIUM CHLORIDE 600; 310; 30; 20 MG/100ML; MG/100ML; MG/100ML; MG/100ML
1500 INJECTION, SOLUTION INTRAVENOUS ONCE
Status: CANCELLED
Start: 2022-08-11 | End: 2022-08-11

## 2022-08-08 RX ORDER — METHYLPREDNISOLONE SODIUM SUCCINATE 125 MG/2ML
60 INJECTION, POWDER, LYOPHILIZED, FOR SOLUTION INTRAMUSCULAR; INTRAVENOUS ONCE
Status: COMPLETED | OUTPATIENT
Start: 2022-08-08 | End: 2022-08-08

## 2022-08-08 RX ADMIN — DIPHENHYDRAMINE HYDROCHLORIDE 50 MG: 50 INJECTION, SOLUTION INTRAMUSCULAR; INTRAVENOUS at 11:05

## 2022-08-08 RX ADMIN — METHYLPREDNISOLONE SODIUM SUCCINATE 60 MG: 125 INJECTION, POWDER, FOR SOLUTION INTRAMUSCULAR; INTRAVENOUS at 11:00

## 2022-08-08 RX ADMIN — SODIUM CHLORIDE, SODIUM LACTATE, POTASSIUM CHLORIDE, AND CALCIUM CHLORIDE 1500 ML: .6; .31; .03; .02 INJECTION, SOLUTION INTRAVENOUS at 11:37

## 2022-08-11 ENCOUNTER — HOSPITAL ENCOUNTER (OUTPATIENT)
Dept: INFUSION CENTER | Facility: CLINIC | Age: 47
Discharge: HOME | End: 2022-08-11

## 2022-08-12 ENCOUNTER — HOSPITAL ENCOUNTER (OUTPATIENT)
Dept: INFUSION CENTER | Facility: HOSPITAL | Age: 47
Discharge: HOME | End: 2022-08-12
Attending: INTERNAL MEDICINE
Payer: COMMERCIAL

## 2022-08-12 VITALS
SYSTOLIC BLOOD PRESSURE: 116 MMHG | DIASTOLIC BLOOD PRESSURE: 76 MMHG | OXYGEN SATURATION: 96 % | RESPIRATION RATE: 16 BRPM | HEART RATE: 104 BPM | TEMPERATURE: 99.1 F

## 2022-08-12 DIAGNOSIS — R63.0 ANOREXIA: Primary | ICD-10-CM

## 2022-08-12 DIAGNOSIS — E44.1 MILD PROTEIN-CALORIE MALNUTRITION (HCC): ICD-10-CM

## 2022-08-12 DIAGNOSIS — R19.7 DIARRHEA, UNSPECIFIED TYPE: ICD-10-CM

## 2022-08-12 PROCEDURE — 96361 HYDRATE IV INFUSION ADD-ON: CPT

## 2022-08-12 PROCEDURE — 96360 HYDRATION IV INFUSION INIT: CPT

## 2022-08-12 RX ORDER — SODIUM CHLORIDE, SODIUM LACTATE, POTASSIUM CHLORIDE, CALCIUM CHLORIDE 600; 310; 30; 20 MG/100ML; MG/100ML; MG/100ML; MG/100ML
1500 INJECTION, SOLUTION INTRAVENOUS ONCE
Status: COMPLETED | OUTPATIENT
Start: 2022-08-12 | End: 2022-08-12

## 2022-08-12 RX ORDER — METHYLPREDNISOLONE SODIUM SUCCINATE 125 MG/2ML
60 INJECTION, POWDER, LYOPHILIZED, FOR SOLUTION INTRAMUSCULAR; INTRAVENOUS ONCE
Status: CANCELLED
Start: 2022-08-15 | End: 2022-08-14

## 2022-08-12 RX ORDER — SODIUM CHLORIDE, SODIUM LACTATE, POTASSIUM CHLORIDE, CALCIUM CHLORIDE 600; 310; 30; 20 MG/100ML; MG/100ML; MG/100ML; MG/100ML
1500 INJECTION, SOLUTION INTRAVENOUS ONCE
Status: CANCELLED
Start: 2022-08-15 | End: 2022-08-14

## 2022-08-12 RX ADMIN — SODIUM CHLORIDE, POTASSIUM CHLORIDE, SODIUM LACTATE AND CALCIUM CHLORIDE 1500 ML: 600; 310; 30; 20 INJECTION, SOLUTION INTRAVENOUS at 12:21

## 2022-08-15 ENCOUNTER — TELEPHONE (OUTPATIENT)
Dept: GASTROENTEROLOGY | Facility: CLINIC | Age: 47
End: 2022-08-15

## 2022-08-15 ENCOUNTER — HOSPITAL ENCOUNTER (OUTPATIENT)
Dept: INFUSION CENTER | Facility: CLINIC | Age: 47
End: 2022-08-15

## 2022-08-15 ENCOUNTER — HOSPITAL ENCOUNTER (OUTPATIENT)
Dept: INFUSION CENTER | Facility: HOSPITAL | Age: 47
Discharge: HOME | End: 2022-08-15
Attending: INTERNAL MEDICINE
Payer: COMMERCIAL

## 2022-08-15 VITALS
TEMPERATURE: 98 F | HEART RATE: 91 BPM | SYSTOLIC BLOOD PRESSURE: 131 MMHG | OXYGEN SATURATION: 97 % | RESPIRATION RATE: 16 BRPM | DIASTOLIC BLOOD PRESSURE: 86 MMHG

## 2022-08-15 DIAGNOSIS — R63.0 ANOREXIA: Primary | ICD-10-CM

## 2022-08-15 DIAGNOSIS — R19.7 DIARRHEA, UNSPECIFIED TYPE: ICD-10-CM

## 2022-08-15 DIAGNOSIS — E44.1 MILD PROTEIN-CALORIE MALNUTRITION (HCC): ICD-10-CM

## 2022-08-15 PROCEDURE — 96375 TX/PRO/DX INJ NEW DRUG ADDON: CPT

## 2022-08-15 PROCEDURE — 96365 THER/PROPH/DIAG IV INF INIT: CPT

## 2022-08-15 PROCEDURE — 96361 HYDRATE IV INFUSION ADD-ON: CPT

## 2022-08-15 RX ORDER — SODIUM CHLORIDE, SODIUM LACTATE, POTASSIUM CHLORIDE, CALCIUM CHLORIDE 600; 310; 30; 20 MG/100ML; MG/100ML; MG/100ML; MG/100ML
1500 INJECTION, SOLUTION INTRAVENOUS ONCE
Status: CANCELLED
Start: 2022-08-18 | End: 2022-08-18

## 2022-08-15 RX ORDER — METHYLPREDNISOLONE SODIUM SUCCINATE 125 MG/2ML
60 INJECTION, POWDER, LYOPHILIZED, FOR SOLUTION INTRAMUSCULAR; INTRAVENOUS ONCE
Status: COMPLETED | OUTPATIENT
Start: 2022-08-15 | End: 2022-08-15

## 2022-08-15 RX ORDER — METHYLPREDNISOLONE SODIUM SUCCINATE 125 MG/2ML
60 INJECTION, POWDER, LYOPHILIZED, FOR SOLUTION INTRAMUSCULAR; INTRAVENOUS ONCE
Status: CANCELLED
Start: 2022-08-22 | End: 2022-08-18

## 2022-08-15 RX ORDER — SODIUM CHLORIDE, SODIUM LACTATE, POTASSIUM CHLORIDE, CALCIUM CHLORIDE 600; 310; 30; 20 MG/100ML; MG/100ML; MG/100ML; MG/100ML
1500 INJECTION, SOLUTION INTRAVENOUS ONCE
Status: COMPLETED | OUTPATIENT
Start: 2022-08-15 | End: 2022-08-15

## 2022-08-15 RX ADMIN — METHYLPREDNISOLONE SODIUM SUCCINATE 60 MG: 125 INJECTION, POWDER, FOR SOLUTION INTRAMUSCULAR; INTRAVENOUS at 11:50

## 2022-08-15 RX ADMIN — SODIUM CHLORIDE, SODIUM LACTATE, POTASSIUM CHLORIDE, AND CALCIUM CHLORIDE 1500 ML: .6; .31; .03; .02 INJECTION, SOLUTION INTRAVENOUS at 12:07

## 2022-08-15 RX ADMIN — DIPHENHYDRAMINE HYDROCHLORIDE 50 MG: 50 INJECTION, SOLUTION INTRAMUSCULAR; INTRAVENOUS at 11:14

## 2022-08-15 NOTE — TELEPHONE ENCOUNTER
Called and spoke with Marbin Lucas  I have not been prescribing her IVIG  I recommended she call the patient directly to clarify the IVIG prescription and who has been prescribing it

## 2022-08-15 NOTE — TELEPHONE ENCOUNTER
Patients GI provider:  Dr Donnelly Duty    Number to return call: [phone] (infusion , Caty Pruett)    Reason for call: Caty Pruett from infusion center calling requesting two things  1: Dose of Lungodora Roxana 148 updated on patients med list   2: Order in treatment plan that it is ok for patient to self administer IGG when she is at infusion center          Scheduled procedure/appointment date if applicable: n/a

## 2022-08-15 NOTE — PROGRESS NOTES
Patient was noted to be preparing and self administering SQ IVIG  When asked, patient stated that she has the medication for home, but brings it to her infusions on premedicated days  Addressed concerns with unit manager, and was instructed to proceed with hydration infusion today  Patient tolerated infusion well, no complications  No distress noted

## 2022-08-16 DIAGNOSIS — R63.0 ANOREXIA: Primary | ICD-10-CM

## 2022-08-16 DIAGNOSIS — R19.7 DIARRHEA, UNSPECIFIED TYPE: ICD-10-CM

## 2022-08-16 DIAGNOSIS — E44.1 MILD PROTEIN-CALORIE MALNUTRITION (HCC): ICD-10-CM

## 2022-08-18 ENCOUNTER — HOSPITAL ENCOUNTER (OUTPATIENT)
Dept: INFUSION CENTER | Facility: HOSPITAL | Age: 47
Discharge: HOME | End: 2022-08-18
Attending: INTERNAL MEDICINE

## 2022-08-22 ENCOUNTER — HOSPITAL ENCOUNTER (OUTPATIENT)
Dept: INFUSION CENTER | Facility: CLINIC | Age: 47
Discharge: HOME | End: 2022-08-22

## 2022-08-22 ENCOUNTER — TELEPHONE (OUTPATIENT)
Dept: FAMILY MEDICINE CLINIC | Facility: CLINIC | Age: 47
End: 2022-08-22

## 2022-08-22 ENCOUNTER — TELEPHONE (OUTPATIENT)
Dept: GASTROENTEROLOGY | Facility: CLINIC | Age: 47
End: 2022-08-22

## 2022-08-22 NOTE — TELEPHONE ENCOUNTER
Thank you for the message  I am sorry she is going through all of this  Unfortunately, she has very rare condition that needs specialists care  I a would recommend that she discuss this with her provider who is prescribing this Stelara  I am just not really sure what I am able to help with from a primary care perspective

## 2022-08-22 NOTE — TELEPHONE ENCOUNTER
----- Message from Shruti Johnson sent at 8/19/2022  6:20 PM EDT -----  Regarding: Lyndon Armando has been everything but easy and smooth  No sooner I start feeling better they change treatments, stop my stelara, give me an issue with doing my subQ during hydration, or order tests thats they can't comprehend how Mast Cell Disease has anything to do with what they will be doing  No matter what documents i provide they insist "you'll be fine" & the reality is i always react in some way  Whether hypersensitivity, hives, itching, diarrhea, vomiting, or Anaphylaxis  I'm beyond frustrated  All i want to do is sleep because at least then i don't need to eat or drink, so then i don't get sick, and if i don't get sick i don't need to take more than my "green zone meds" i also don't dehydrate as quickly, if i don't have to take all the antihistamines  Its all relative  I feel like a cat chasing her tail thats never going to catch it  My last stelara was 7/11/2022, so i'm 2 weeks overdue, and in a colitis flare up with uncontrollable bowels   I avoid eating, to avoid shitting myself  Catalino Zambrano

## 2022-08-22 NOTE — TELEPHONE ENCOUNTER
I called and spoke with patient, informed her of MJ recommendations, she verbalized understanding, she was pleasant and appreciative of the call

## 2022-08-22 NOTE — TELEPHONE ENCOUNTER
I spoke with [facility] on 8/16 and they had received authorization and were working on scheduling patient   I emailed them again to check status

## 2022-08-22 NOTE — TELEPHONE ENCOUNTER
Hi,    She sent me a message that she is overdue for her stelara  Are you able to help her get her injections?     Thank you

## 2022-08-24 NOTE — TELEPHONE ENCOUNTER
Jarocho Diego  to Me          5:43 PM  They called today and have me scheduled for Friday morning at 10am

## 2022-08-26 ENCOUNTER — HOSPITAL ENCOUNTER (OUTPATIENT)
Dept: INFUSION CENTER | Facility: CLINIC | Age: 47
End: 2022-08-26

## 2022-08-26 ENCOUNTER — TELEPHONE (OUTPATIENT)
Dept: GASTROENTEROLOGY | Facility: CLINIC | Age: 47
End: 2022-08-26

## 2022-08-26 ENCOUNTER — TELEPHONE (OUTPATIENT)
Dept: OTHER | Facility: OTHER | Age: 47
End: 2022-08-26

## 2022-08-26 NOTE — TELEPHONE ENCOUNTER
Attempted to contact Koffi back however, number listed ( [phone]) is turned off  Cannot speak with anyone   Phone just says "call back on next business day"     Emailed [facility] to ask for another phone number

## 2022-08-26 NOTE — TELEPHONE ENCOUNTER
Patients GI provider:  Dr Katelynn Solorzano    Number to return call: [phone] ext [phone]    Reason for call: Mars Paez from [address] [place] [address] infusion services called in stating that the delivery was sent on time to pts home but he stated that the script when it came through originally was a little faded and it seemed like the order was for 8 weeks but then he was looking at the order and notes last night and realized that it should have been for every 21 days  He stated hes never seen this high of frequency before and wanted to speak to someone to clarify  Above is his number       Scheduled procedure/appointment date if applicable: Appt  19/88/15

## 2022-08-29 ENCOUNTER — HOSPITAL ENCOUNTER (OUTPATIENT)
Dept: INFUSION CENTER | Facility: CLINIC | Age: 47
Discharge: HOME | End: 2022-08-29
Payer: COMMERCIAL

## 2022-08-29 VITALS
HEART RATE: 108 BPM | SYSTOLIC BLOOD PRESSURE: 102 MMHG | TEMPERATURE: 97.4 F | RESPIRATION RATE: 16 BRPM | DIASTOLIC BLOOD PRESSURE: 74 MMHG

## 2022-08-29 DIAGNOSIS — E44.1 MILD PROTEIN-CALORIE MALNUTRITION (HCC): ICD-10-CM

## 2022-08-29 DIAGNOSIS — R63.0 ANOREXIA: Primary | ICD-10-CM

## 2022-08-29 DIAGNOSIS — R19.7 DIARRHEA, UNSPECIFIED TYPE: ICD-10-CM

## 2022-08-29 PROCEDURE — 96361 HYDRATE IV INFUSION ADD-ON: CPT

## 2022-08-29 PROCEDURE — 96360 HYDRATION IV INFUSION INIT: CPT

## 2022-08-29 RX ORDER — SODIUM CHLORIDE, SODIUM LACTATE, POTASSIUM CHLORIDE, CALCIUM CHLORIDE 600; 310; 30; 20 MG/100ML; MG/100ML; MG/100ML; MG/100ML
1500 INJECTION, SOLUTION INTRAVENOUS ONCE
Status: CANCELLED
Start: 2022-09-01 | End: 2022-08-30

## 2022-08-29 RX ORDER — SODIUM CHLORIDE, SODIUM LACTATE, POTASSIUM CHLORIDE, CALCIUM CHLORIDE 600; 310; 30; 20 MG/100ML; MG/100ML; MG/100ML; MG/100ML
1500 INJECTION, SOLUTION INTRAVENOUS ONCE
Status: COMPLETED | OUTPATIENT
Start: 2022-08-29 | End: 2022-08-29

## 2022-08-29 RX ORDER — METHYLPREDNISOLONE SODIUM SUCCINATE 125 MG/2ML
60 INJECTION, POWDER, LYOPHILIZED, FOR SOLUTION INTRAMUSCULAR; INTRAVENOUS ONCE
Status: CANCELLED
Start: 2022-08-30 | End: 2022-08-30

## 2022-08-29 RX ADMIN — SODIUM CHLORIDE, SODIUM LACTATE, POTASSIUM CHLORIDE, AND CALCIUM CHLORIDE 1500 ML: .6; .31; .03; .02 INJECTION, SOLUTION INTRAVENOUS at 12:33

## 2022-09-01 ENCOUNTER — APPOINTMENT (OUTPATIENT)
Dept: LAB | Facility: HOSPITAL | Age: 47
End: 2022-09-01
Payer: MEDICARE

## 2022-09-01 ENCOUNTER — HOSPITAL ENCOUNTER (OUTPATIENT)
Dept: INFUSION CENTER | Facility: CLINIC | Age: 47
Discharge: HOME | End: 2022-09-01
Payer: COMMERCIAL

## 2022-09-01 VITALS
TEMPERATURE: 97.3 F | RESPIRATION RATE: 18 BRPM | SYSTOLIC BLOOD PRESSURE: 118 MMHG | HEART RATE: 106 BPM | DIASTOLIC BLOOD PRESSURE: 82 MMHG

## 2022-09-01 DIAGNOSIS — E05.90 THYROTOXICOSIS WITHOUT THYROID STORM, UNSPECIFIED THYROTOXICOSIS TYPE: ICD-10-CM

## 2022-09-01 DIAGNOSIS — E03.9 HYPOTHYROIDISM, UNSPECIFIED TYPE: ICD-10-CM

## 2022-09-01 DIAGNOSIS — R73.9 HYPERGLYCEMIA: ICD-10-CM

## 2022-09-01 DIAGNOSIS — R63.0 ANOREXIA: Primary | ICD-10-CM

## 2022-09-01 DIAGNOSIS — E44.1 MILD PROTEIN-CALORIE MALNUTRITION (HCC): ICD-10-CM

## 2022-09-01 DIAGNOSIS — R19.7 DIARRHEA, UNSPECIFIED TYPE: ICD-10-CM

## 2022-09-01 LAB
25(OH)D3 SERPL-MCNC: 26.4 NG/ML (ref 30–100)
ALBUMIN SERPL BCP-MCNC: 3.7 G/DL (ref 3.5–5)
ALP SERPL-CCNC: 63 U/L (ref 46–116)
ALT SERPL W P-5'-P-CCNC: 25 U/L (ref 12–78)
ANION GAP SERPL CALCULATED.3IONS-SCNC: 11 MMOL/L (ref 4–13)
AST SERPL W P-5'-P-CCNC: 12 U/L (ref 5–45)
BILIRUB SERPL-MCNC: 0.19 MG/DL (ref 0.2–1)
BUN SERPL-MCNC: 8 MG/DL (ref 5–25)
CALCIUM SERPL-MCNC: 9 MG/DL (ref 8.3–10.1)
CHLORIDE SERPL-SCNC: 102 MMOL/L (ref 96–108)
CO2 SERPL-SCNC: 24 MMOL/L (ref 21–32)
CREAT SERPL-MCNC: 0.74 MG/DL (ref 0.6–1.3)
ERYTHROCYTE [DISTWIDTH] IN BLOOD BY AUTOMATED COUNT: 13.5 % (ref 11.6–15.1)
EST. AVERAGE GLUCOSE BLD GHB EST-MCNC: 108 MG/DL
FOLATE SERPL-MCNC: 16.4 NG/ML (ref 3.1–17.5)
GFR SERPL CREATININE-BSD FRML MDRD: 95 ML/MIN/1.73SQ M
GLUCOSE SERPL-MCNC: 162 MG/DL (ref 65–140)
HBA1C MFR BLD: 5.4 %
HCT VFR BLD AUTO: 41.5 % (ref 34.8–46.1)
HGB BLD-MCNC: 13.9 G/DL (ref 11.5–15.4)
IRON SERPL-MCNC: 58 UG/DL (ref 50–170)
MAGNESIUM SERPL-MCNC: 1.8 MG/DL (ref 1.6–2.6)
MCH RBC QN AUTO: 30 PG (ref 26.8–34.3)
MCHC RBC AUTO-ENTMCNC: 33.5 G/DL (ref 31.4–37.4)
MCV RBC AUTO: 90 FL (ref 82–98)
PLATELET # BLD AUTO: 386 THOUSANDS/UL (ref 149–390)
PMV BLD AUTO: 9.1 FL (ref 8.9–12.7)
POTASSIUM SERPL-SCNC: 4 MMOL/L (ref 3.5–5.3)
PROT SERPL-MCNC: 7.2 G/DL (ref 6.4–8.4)
PTH-INTACT SERPL-MCNC: 51.6 PG/ML (ref 18.4–80.1)
RBC # BLD AUTO: 4.63 MILLION/UL (ref 3.81–5.12)
SODIUM SERPL-SCNC: 137 MMOL/L (ref 135–147)
T3FREE SERPL-MCNC: 1.73 PG/ML (ref 2.3–4.2)
T4 FREE SERPL-MCNC: 0.91 NG/DL (ref 0.76–1.46)
TSH SERPL DL<=0.05 MIU/L-ACNC: 0.74 UIU/ML (ref 0.45–4.5)
VIT B12 SERPL-MCNC: 971 PG/ML (ref 100–900)
WBC # BLD AUTO: 5.75 THOUSAND/UL (ref 4.31–10.16)

## 2022-09-01 PROCEDURE — 80053 COMPREHEN METABOLIC PANEL: CPT

## 2022-09-01 PROCEDURE — 83540 ASSAY OF IRON: CPT

## 2022-09-01 PROCEDURE — 82746 ASSAY OF FOLIC ACID SERUM: CPT

## 2022-09-01 PROCEDURE — 82607 VITAMIN B-12: CPT

## 2022-09-01 PROCEDURE — 36415 COLL VENOUS BLD VENIPUNCTURE: CPT

## 2022-09-01 PROCEDURE — 83735 ASSAY OF MAGNESIUM: CPT

## 2022-09-01 PROCEDURE — 83036 HEMOGLOBIN GLYCOSYLATED A1C: CPT

## 2022-09-01 PROCEDURE — 82306 VITAMIN D 25 HYDROXY: CPT

## 2022-09-01 PROCEDURE — 85027 COMPLETE CBC AUTOMATED: CPT

## 2022-09-01 PROCEDURE — 83970 ASSAY OF PARATHORMONE: CPT

## 2022-09-01 PROCEDURE — 86800 THYROGLOBULIN ANTIBODY: CPT

## 2022-09-01 PROCEDURE — 96360 HYDRATION IV INFUSION INIT: CPT

## 2022-09-01 PROCEDURE — 84443 ASSAY THYROID STIM HORMONE: CPT

## 2022-09-01 PROCEDURE — 84481 FREE ASSAY (FT-3): CPT

## 2022-09-01 PROCEDURE — 96361 HYDRATE IV INFUSION ADD-ON: CPT

## 2022-09-01 PROCEDURE — 84439 ASSAY OF FREE THYROXINE: CPT

## 2022-09-01 RX ORDER — SODIUM CHLORIDE, SODIUM LACTATE, POTASSIUM CHLORIDE, CALCIUM CHLORIDE 600; 310; 30; 20 MG/100ML; MG/100ML; MG/100ML; MG/100ML
1500 INJECTION, SOLUTION INTRAVENOUS ONCE
Status: CANCELLED
Start: 2022-09-06 | End: 2022-09-04

## 2022-09-01 RX ORDER — SODIUM CHLORIDE, SODIUM LACTATE, POTASSIUM CHLORIDE, CALCIUM CHLORIDE 600; 310; 30; 20 MG/100ML; MG/100ML; MG/100ML; MG/100ML
1500 INJECTION, SOLUTION INTRAVENOUS ONCE
Status: COMPLETED | OUTPATIENT
Start: 2022-09-01 | End: 2022-09-01

## 2022-09-01 RX ORDER — METHYLPREDNISOLONE SODIUM SUCCINATE 125 MG/2ML
60 INJECTION, POWDER, LYOPHILIZED, FOR SOLUTION INTRAMUSCULAR; INTRAVENOUS ONCE
Status: CANCELLED
Start: 2022-09-04 | End: 2022-09-04

## 2022-09-01 RX ADMIN — SODIUM CHLORIDE, SODIUM LACTATE, POTASSIUM CHLORIDE, AND CALCIUM CHLORIDE 1500 ML: .6; .31; .03; .02 INJECTION, SOLUTION INTRAVENOUS at 10:25

## 2022-09-03 LAB — THYROGLOB AB SERPL-ACNC: <1 IU/ML (ref 0–0.9)

## 2022-09-06 ENCOUNTER — HOSPITAL ENCOUNTER (OUTPATIENT)
Dept: INFUSION CENTER | Facility: HOSPITAL | Age: 47
Discharge: HOME | End: 2022-09-06
Payer: COMMERCIAL

## 2022-09-06 VITALS
RESPIRATION RATE: 18 BRPM | OXYGEN SATURATION: 95 % | TEMPERATURE: 98.1 F | SYSTOLIC BLOOD PRESSURE: 109 MMHG | HEART RATE: 101 BPM | DIASTOLIC BLOOD PRESSURE: 73 MMHG

## 2022-09-06 DIAGNOSIS — R63.0 ANOREXIA: Primary | ICD-10-CM

## 2022-09-06 DIAGNOSIS — E44.1 MILD PROTEIN-CALORIE MALNUTRITION (HCC): ICD-10-CM

## 2022-09-06 DIAGNOSIS — R19.7 DIARRHEA, UNSPECIFIED TYPE: ICD-10-CM

## 2022-09-06 PROCEDURE — 96375 TX/PRO/DX INJ NEW DRUG ADDON: CPT

## 2022-09-06 PROCEDURE — 96361 HYDRATE IV INFUSION ADD-ON: CPT

## 2022-09-06 PROCEDURE — 96365 THER/PROPH/DIAG IV INF INIT: CPT

## 2022-09-06 RX ORDER — METHYLPREDNISOLONE SODIUM SUCCINATE 125 MG/2ML
60 INJECTION, POWDER, LYOPHILIZED, FOR SOLUTION INTRAMUSCULAR; INTRAVENOUS ONCE
Status: COMPLETED | OUTPATIENT
Start: 2022-09-06 | End: 2022-09-06

## 2022-09-06 RX ORDER — SODIUM CHLORIDE, SODIUM LACTATE, POTASSIUM CHLORIDE, CALCIUM CHLORIDE 600; 310; 30; 20 MG/100ML; MG/100ML; MG/100ML; MG/100ML
1500 INJECTION, SOLUTION INTRAVENOUS ONCE
Status: CANCELLED
Start: 2022-09-06 | End: 2022-09-06

## 2022-09-06 RX ORDER — SODIUM CHLORIDE, SODIUM LACTATE, POTASSIUM CHLORIDE, CALCIUM CHLORIDE 600; 310; 30; 20 MG/100ML; MG/100ML; MG/100ML; MG/100ML
1500 INJECTION, SOLUTION INTRAVENOUS ONCE
Status: COMPLETED | OUTPATIENT
Start: 2022-09-06 | End: 2022-09-06

## 2022-09-06 RX ORDER — SODIUM CHLORIDE, SODIUM LACTATE, POTASSIUM CHLORIDE, CALCIUM CHLORIDE 600; 310; 30; 20 MG/100ML; MG/100ML; MG/100ML; MG/100ML
1500 INJECTION, SOLUTION INTRAVENOUS ONCE
Status: CANCELLED
Start: 2022-09-12 | End: 2022-09-09

## 2022-09-06 RX ORDER — METHYLPREDNISOLONE SODIUM SUCCINATE 125 MG/2ML
60 INJECTION, POWDER, LYOPHILIZED, FOR SOLUTION INTRAMUSCULAR; INTRAVENOUS ONCE
Status: CANCELLED
Start: 2022-09-12 | End: 2022-09-09

## 2022-09-06 RX ADMIN — SODIUM CHLORIDE, SODIUM LACTATE, POTASSIUM CHLORIDE, AND CALCIUM CHLORIDE 1500 ML: .6; .31; .03; .02 INJECTION, SOLUTION INTRAVENOUS at 12:18

## 2022-09-06 RX ADMIN — METHYLPREDNISOLONE SODIUM SUCCINATE 60 MG: 125 INJECTION, POWDER, FOR SOLUTION INTRAMUSCULAR; INTRAVENOUS at 15:56

## 2022-09-06 RX ADMIN — DIPHENHYDRAMINE HYDROCHLORIDE 50 MG: 50 INJECTION, SOLUTION INTRAMUSCULAR; INTRAVENOUS at 15:20

## 2022-09-08 ENCOUNTER — HOSPITAL ENCOUNTER (OUTPATIENT)
Dept: INFUSION CENTER | Facility: CLINIC | Age: 47
End: 2022-09-08

## 2022-09-09 ENCOUNTER — HOSPITAL ENCOUNTER (OUTPATIENT)
Dept: INFUSION CENTER | Facility: CLINIC | Age: 47
End: 2022-09-09
Payer: COMMERCIAL

## 2022-09-09 VITALS
HEART RATE: 98 BPM | SYSTOLIC BLOOD PRESSURE: 110 MMHG | DIASTOLIC BLOOD PRESSURE: 81 MMHG | TEMPERATURE: 97.8 F | RESPIRATION RATE: 18 BRPM

## 2022-09-09 DIAGNOSIS — E44.1 MILD PROTEIN-CALORIE MALNUTRITION (HCC): ICD-10-CM

## 2022-09-09 DIAGNOSIS — R19.7 DIARRHEA, UNSPECIFIED TYPE: ICD-10-CM

## 2022-09-09 DIAGNOSIS — R63.0 ANOREXIA: Primary | ICD-10-CM

## 2022-09-09 PROCEDURE — 96360 HYDRATION IV INFUSION INIT: CPT

## 2022-09-09 PROCEDURE — 96361 HYDRATE IV INFUSION ADD-ON: CPT

## 2022-09-09 RX ORDER — SODIUM CHLORIDE, SODIUM LACTATE, POTASSIUM CHLORIDE, CALCIUM CHLORIDE 600; 310; 30; 20 MG/100ML; MG/100ML; MG/100ML; MG/100ML
1500 INJECTION, SOLUTION INTRAVENOUS ONCE
Status: COMPLETED | OUTPATIENT
Start: 2022-09-09 | End: 2022-09-09

## 2022-09-09 RX ORDER — METHYLPREDNISOLONE SODIUM SUCCINATE 125 MG/2ML
60 INJECTION, POWDER, LYOPHILIZED, FOR SOLUTION INTRAMUSCULAR; INTRAVENOUS ONCE
Status: CANCELLED
Start: 2022-09-12 | End: 2022-09-12

## 2022-09-09 RX ORDER — SODIUM CHLORIDE, SODIUM LACTATE, POTASSIUM CHLORIDE, CALCIUM CHLORIDE 600; 310; 30; 20 MG/100ML; MG/100ML; MG/100ML; MG/100ML
1500 INJECTION, SOLUTION INTRAVENOUS ONCE
Status: CANCELLED
Start: 2022-09-12 | End: 2022-09-12

## 2022-09-09 RX ADMIN — SODIUM CHLORIDE, SODIUM LACTATE, POTASSIUM CHLORIDE, AND CALCIUM CHLORIDE 1500 ML: .6; .31; .03; .02 INJECTION, SOLUTION INTRAVENOUS at 13:25

## 2022-09-09 NOTE — PLAN OF CARE
Problem: Knowledge Deficit  Goal: Patient/family/caregiver demonstrates understanding of disease process, treatment plan, medications, and discharge instructions  Description: Complete learning assessment and assess knowledge base  Interventions:  - Provide teaching at level of understanding  - Provide teaching via preferred learning methods  9/9/2022 1346 by Charlene Judd RN  Outcome: Progressing  9/9/2022 1333 by Charlene Judd RN  Outcome: Progressing     Problem: Knowledge Deficit  Goal: Patient/family/caregiver demonstrates understanding of disease process, treatment plan, medications, and discharge instructions  Description: Complete learning assessment and assess knowledge base    Interventions:  - Provide teaching at level of understanding  - Provide teaching via preferred learning methods  9/9/2022 1346 by Charlene Judd RN  Outcome: Progressing  9/9/2022 1333 by Charlene Judd RN  Outcome: Progressing

## 2022-09-12 ENCOUNTER — HOSPITAL ENCOUNTER (OUTPATIENT)
Dept: INFUSION CENTER | Facility: HOSPITAL | Age: 47
Discharge: HOME | End: 2022-09-12
Payer: COMMERCIAL

## 2022-09-12 VITALS
SYSTOLIC BLOOD PRESSURE: 120 MMHG | DIASTOLIC BLOOD PRESSURE: 79 MMHG | TEMPERATURE: 98.5 F | HEART RATE: 104 BPM | RESPIRATION RATE: 16 BRPM

## 2022-09-12 DIAGNOSIS — R63.0 ANOREXIA: Primary | ICD-10-CM

## 2022-09-12 DIAGNOSIS — R19.7 DIARRHEA, UNSPECIFIED TYPE: ICD-10-CM

## 2022-09-12 DIAGNOSIS — E44.1 MILD PROTEIN-CALORIE MALNUTRITION (HCC): ICD-10-CM

## 2022-09-12 PROCEDURE — 96361 HYDRATE IV INFUSION ADD-ON: CPT

## 2022-09-12 PROCEDURE — 96365 THER/PROPH/DIAG IV INF INIT: CPT

## 2022-09-12 PROCEDURE — 96375 TX/PRO/DX INJ NEW DRUG ADDON: CPT

## 2022-09-12 RX ORDER — METHYLPREDNISOLONE SODIUM SUCCINATE 125 MG/2ML
60 INJECTION, POWDER, LYOPHILIZED, FOR SOLUTION INTRAMUSCULAR; INTRAVENOUS ONCE
Status: CANCELLED
Start: 2022-09-19 | End: 2022-09-15

## 2022-09-12 RX ORDER — SODIUM CHLORIDE, SODIUM LACTATE, POTASSIUM CHLORIDE, CALCIUM CHLORIDE 600; 310; 30; 20 MG/100ML; MG/100ML; MG/100ML; MG/100ML
1500 INJECTION, SOLUTION INTRAVENOUS ONCE
Status: COMPLETED | OUTPATIENT
Start: 2022-09-12 | End: 2022-09-12

## 2022-09-12 RX ORDER — SODIUM CHLORIDE, SODIUM LACTATE, POTASSIUM CHLORIDE, CALCIUM CHLORIDE 600; 310; 30; 20 MG/100ML; MG/100ML; MG/100ML; MG/100ML
1500 INJECTION, SOLUTION INTRAVENOUS ONCE
Status: CANCELLED
Start: 2022-09-15 | End: 2022-09-15

## 2022-09-12 RX ORDER — METHYLPREDNISOLONE SODIUM SUCCINATE 125 MG/2ML
60 INJECTION, POWDER, LYOPHILIZED, FOR SOLUTION INTRAMUSCULAR; INTRAVENOUS ONCE
Status: COMPLETED | OUTPATIENT
Start: 2022-09-12 | End: 2022-09-12

## 2022-09-12 RX ADMIN — METHYLPREDNISOLONE SODIUM SUCCINATE 60 MG: 125 INJECTION, POWDER, FOR SOLUTION INTRAMUSCULAR; INTRAVENOUS at 15:48

## 2022-09-12 RX ADMIN — DIPHENHYDRAMINE HYDROCHLORIDE 50 MG: 50 INJECTION, SOLUTION INTRAMUSCULAR; INTRAVENOUS at 15:21

## 2022-09-12 RX ADMIN — SODIUM CHLORIDE, SODIUM LACTATE, POTASSIUM CHLORIDE, AND CALCIUM CHLORIDE 1500 ML: .6; .31; .03; .02 INJECTION, SOLUTION INTRAVENOUS at 12:23

## 2022-09-15 ENCOUNTER — HOSPITAL ENCOUNTER (OUTPATIENT)
Dept: INFUSION CENTER | Facility: CLINIC | Age: 47
End: 2022-09-15

## 2022-09-16 ENCOUNTER — HOSPITAL ENCOUNTER (OUTPATIENT)
Dept: INFUSION CENTER | Facility: HOSPITAL | Age: 47
End: 2022-09-16
Payer: COMMERCIAL

## 2022-09-16 VITALS
SYSTOLIC BLOOD PRESSURE: 113 MMHG | TEMPERATURE: 98.3 F | OXYGEN SATURATION: 97 % | RESPIRATION RATE: 16 BRPM | DIASTOLIC BLOOD PRESSURE: 70 MMHG | HEART RATE: 106 BPM

## 2022-09-16 DIAGNOSIS — R63.0 ANOREXIA: Primary | ICD-10-CM

## 2022-09-16 DIAGNOSIS — R19.7 DIARRHEA, UNSPECIFIED TYPE: ICD-10-CM

## 2022-09-16 DIAGNOSIS — E44.1 MILD PROTEIN-CALORIE MALNUTRITION (HCC): ICD-10-CM

## 2022-09-16 PROCEDURE — 96361 HYDRATE IV INFUSION ADD-ON: CPT

## 2022-09-16 PROCEDURE — 96360 HYDRATION IV INFUSION INIT: CPT

## 2022-09-16 RX ORDER — SODIUM CHLORIDE, SODIUM LACTATE, POTASSIUM CHLORIDE, CALCIUM CHLORIDE 600; 310; 30; 20 MG/100ML; MG/100ML; MG/100ML; MG/100ML
1500 INJECTION, SOLUTION INTRAVENOUS ONCE
Status: COMPLETED | OUTPATIENT
Start: 2022-09-16 | End: 2022-09-16

## 2022-09-16 RX ORDER — METHYLPREDNISOLONE SODIUM SUCCINATE 125 MG/2ML
60 INJECTION, POWDER, LYOPHILIZED, FOR SOLUTION INTRAMUSCULAR; INTRAVENOUS ONCE
Status: CANCELLED
Start: 2022-09-19 | End: 2022-09-18

## 2022-09-16 RX ORDER — SODIUM CHLORIDE, SODIUM LACTATE, POTASSIUM CHLORIDE, CALCIUM CHLORIDE 600; 310; 30; 20 MG/100ML; MG/100ML; MG/100ML; MG/100ML
1500 INJECTION, SOLUTION INTRAVENOUS ONCE
Status: CANCELLED
Start: 2022-09-19 | End: 2022-09-18

## 2022-09-16 RX ADMIN — SODIUM CHLORIDE, SODIUM LACTATE, POTASSIUM CHLORIDE, AND CALCIUM CHLORIDE 1500 ML: .6; .31; .03; .02 INJECTION, SOLUTION INTRAVENOUS at 11:44

## 2022-09-19 ENCOUNTER — HOSPITAL ENCOUNTER (OUTPATIENT)
Dept: INFUSION CENTER | Facility: HOSPITAL | Age: 47
Discharge: HOME | End: 2022-09-19
Attending: INTERNAL MEDICINE
Payer: COMMERCIAL

## 2022-09-19 ENCOUNTER — OFFICE VISIT (OUTPATIENT)
Dept: FAMILY MEDICINE CLINIC | Facility: CLINIC | Age: 47
End: 2022-09-19
Payer: COMMERCIAL

## 2022-09-19 ENCOUNTER — HOSPITAL ENCOUNTER (OUTPATIENT)
Dept: INFUSION CENTER | Facility: HOSPITAL | Age: 47
Discharge: HOME | End: 2022-09-19

## 2022-09-19 VITALS
BODY MASS INDEX: 24.83 KG/M2 | TEMPERATURE: 98 F | RESPIRATION RATE: 16 BRPM | WEIGHT: 149 LBS | SYSTOLIC BLOOD PRESSURE: 110 MMHG | OXYGEN SATURATION: 97 % | HEART RATE: 87 BPM | HEIGHT: 65 IN | DIASTOLIC BLOOD PRESSURE: 72 MMHG

## 2022-09-19 VITALS
SYSTOLIC BLOOD PRESSURE: 132 MMHG | RESPIRATION RATE: 16 BRPM | TEMPERATURE: 97.7 F | OXYGEN SATURATION: 98 % | DIASTOLIC BLOOD PRESSURE: 80 MMHG | HEART RATE: 85 BPM

## 2022-09-19 DIAGNOSIS — K50.119 CROHN'S DISEASE OF COLON WITH COMPLICATION (HCC): Primary | ICD-10-CM

## 2022-09-19 DIAGNOSIS — R63.0 ANOREXIA: Primary | ICD-10-CM

## 2022-09-19 DIAGNOSIS — E44.1 MILD PROTEIN-CALORIE MALNUTRITION (HCC): ICD-10-CM
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
## 2022-09-19 DIAGNOSIS — D89.40 MAST CELL ACTIVATION SYNDROME (HCC): ICD-10-CM

## 2022-09-19 DIAGNOSIS — R19.7 DIARRHEA, UNSPECIFIED TYPE: ICD-10-CM

## 2022-09-19 DIAGNOSIS — S30.860A TICK BITE OF BACK, INITIAL ENCOUNTER: ICD-10-CM

## 2022-09-19 DIAGNOSIS — G35 MS (MULTIPLE SCLEROSIS) (HCC): ICD-10-CM

## 2022-09-19 PROCEDURE — 96361 HYDRATE IV INFUSION ADD-ON: CPT

## 2022-09-19 PROCEDURE — 99213 OFFICE O/P EST LOW 20 MIN: CPT | Performed by: FAMILY MEDICINE

## 2022-09-19 PROCEDURE — 96365 THER/PROPH/DIAG IV INF INIT: CPT

## 2022-09-19 PROCEDURE — 96375 TX/PRO/DX INJ NEW DRUG ADDON: CPT

## 2022-09-19 RX ORDER — SODIUM CHLORIDE, SODIUM LACTATE, POTASSIUM CHLORIDE, CALCIUM CHLORIDE 600; 310; 30; 20 MG/100ML; MG/100ML; MG/100ML; MG/100ML
1000 INJECTION, SOLUTION INTRAVENOUS ONCE
Status: COMPLETED | OUTPATIENT
Start: 2022-09-19 | End: 2022-09-19

## 2022-09-19 RX ORDER — METHYLPREDNISOLONE SODIUM SUCCINATE 125 MG/2ML
60 INJECTION, POWDER, LYOPHILIZED, FOR SOLUTION INTRAMUSCULAR; INTRAVENOUS ONCE
Status: COMPLETED | OUTPATIENT
Start: 2022-09-19 | End: 2022-09-19

## 2022-09-19 RX ORDER — SODIUM CHLORIDE, SODIUM LACTATE, POTASSIUM CHLORIDE, CALCIUM CHLORIDE 600; 310; 30; 20 MG/100ML; MG/100ML; MG/100ML; MG/100ML
1000 INJECTION, SOLUTION INTRAVENOUS ONCE
Status: CANCELLED
Start: 2022-09-20 | End: 2022-09-20

## 2022-09-19 RX ORDER — METHYLPREDNISOLONE SODIUM SUCCINATE 125 MG/2ML
60 INJECTION, POWDER, LYOPHILIZED, FOR SOLUTION INTRAMUSCULAR; INTRAVENOUS ONCE
Status: CANCELLED
Start: 2022-09-20 | End: 2022-09-20

## 2022-09-19 RX ORDER — ERENUMAB-AOOE 140 MG/ML
140 INJECTION, SOLUTION SUBCUTANEOUS
COMMUNITY
Start: 2022-07-20

## 2022-09-19 RX ORDER — SODIUM CHLORIDE, SODIUM LACTATE, POTASSIUM CHLORIDE, CALCIUM CHLORIDE 600; 310; 30; 20 MG/100ML; MG/100ML; MG/100ML; MG/100ML
1500 INJECTION, SOLUTION INTRAVENOUS ONCE
Status: CANCELLED
Start: 2022-09-19 | End: 2022-09-19

## 2022-09-19 RX ORDER — LEVOTHYROXINE SODIUM 50 MCG
TABLET ORAL
COMMUNITY
Start: 2022-07-12

## 2022-09-19 RX ORDER — SODIUM CHLORIDE, SODIUM LACTATE, POTASSIUM CHLORIDE, CALCIUM CHLORIDE 600; 310; 30; 20 MG/100ML; MG/100ML; MG/100ML; MG/100ML
1000 INJECTION, SOLUTION INTRAVENOUS ONCE
Status: CANCELLED
Start: 2022-09-19 | End: 2022-09-19

## 2022-09-19 RX ORDER — METHYLPREDNISOLONE SODIUM SUCCINATE 40 MG/ML
60 INJECTION, POWDER, LYOPHILIZED, FOR SOLUTION INTRAMUSCULAR; INTRAVENOUS ONCE
Status: CANCELLED
Start: 2022-09-19 | End: 2022-09-19

## 2022-09-19 RX ADMIN — SODIUM CHLORIDE, SODIUM LACTATE, POTASSIUM CHLORIDE, AND CALCIUM CHLORIDE 1000 ML: .6; .31; .03; .02 INJECTION, SOLUTION INTRAVENOUS at 11:32

## 2022-09-19 RX ADMIN — DIPHENHYDRAMINE HYDROCHLORIDE 50 MG: 50 INJECTION, SOLUTION INTRAMUSCULAR; INTRAVENOUS at 13:36

## 2022-09-19 RX ADMIN — METHYLPREDNISOLONE SODIUM SUCCINATE 60 MG: 125 INJECTION, POWDER, FOR SOLUTION INTRAMUSCULAR; INTRAVENOUS at 14:05

## 2022-09-19 NOTE — PROGRESS NOTES
Patient tolerated IV hydration without issues  AVS declined  Patient is aware of appointment tomorrow  Patient ambulated off unit without incident  All personal belongings taken with patient

## 2022-09-19 NOTE — PROGRESS NOTES
Assessment/Plan     Diagnoses and all orders for this visit:    Crohn's disease of colon with complication ([facility] )    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[facility] )    MS (multiple sclerosis) ([facility] )    Tick bite of back, initial encounter  -     Lyme Antibody Profile with reflex to WB; Future    Other orders  -     Synthroid 50 MCG tablet; Taking 1 5 mcg daily  -     Erenumab-aooe (Aimovig) 140 MG/ML SOAJ; 140 mg      Reviewed recent records from patient's specialists today  I spent at least 15 minutes of our visit reviewing records  She is receiving most of her care from them  I will reach out to her allergist to see if we can coordinate infusion treatments here at our hospital   It will be up to them if that will be appropriate  As for her skin lesion, I am not convinced this is secondary to a tick bite however due to her history I will order a Lyme panel  I recommended keeping the area clean with soap and water  She may apply a topical petroleum ointment or Aquaphor over the lesion  I recommended that she continue seeing her specialists as scheduled  Patient in agreement with above plan  Return in about 3 months (around 12/19/2022)  Subjective     Patient Id: Nate Lazo is a 50 y o  female    HPI    Patient here today for routine follow-up appointment  She has a history of multiple complex medical problems including MCAS  She is seeing allergy at at [facility], Dr Isidoro Braga  She is currently receiving SCIG and Xolair prescribed by allergy  She is now wanting to have her injections (possibly infusions) and Xolair given here at our infusion centers  She has been having localized reactions with the SCIV and her allergist would like to possibly change to IVIG due to this  She would like to do these treatments closer to home as traveling to [facility] is too far for her  She has also continued IV hydration therapy managed by GI as well    She would like to coordinate her IV hydration infusions with her other infusion therapy  Patient also notes that approximately 2 and half weeks ago she noticed a small inflamed papule located in her mid back around her bra line  Area is painful and sometimes itchy  She thought that she may have been bitten by a tick but did not notice any definitive tick bite  She is currently applying Neosporin on the area and area has not improved  There is no open wound, bleeding or drainage  She is otherwise doing fairly well and has no further concerns or complaints today  She reports that she has an upcoming appointment in [place] at [facility] for her complex medical diagnoses  Review of Systems   Constitutional: Negative for chills and fever  Respiratory: Negative for cough, choking, chest tightness, shortness of breath and wheezing  Cardiovascular: Negative for chest pain, palpitations and leg swelling  Gastrointestinal: Negative for abdominal pain, constipation, diarrhea, nausea and vomiting  Genitourinary: Negative for dysuria  Musculoskeletal: Positive for arthralgias and myalgias  Skin: Negative for rash  Neurological: Negative for headaches  Psychiatric/Behavioral: Negative for dysphoric mood  Past Medical History:   Diagnosis Date    Anxiety     Asthma     Chronic kidney disease     Crohn's colitis ([place] [address] )     Deep vein thrombosis (HCC)     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    Generalized anxiety disorder 08/26/2017    Heart murmur     Mast cell disease     Migraine     Myocardial infarction [facility])     NSTEMI  pt denies, documented in cardio note    Pancreatitis     sphinger of     Psychiatric disorder     RA (rheumatoid arthritis) ([facility] )     Ulcerative colitis ([facility] )        Past Surgical History:   Procedure Laterality Date    APPENDECTOMY      CHOLECYSTECTOMY      EGD AND COLONOSCOPY N/A 9/12/2017    Procedure: EGD AND COLONOSCOPY;  Surgeon: Kevyn Covarrubias MD;  [facility]: [facility];   Service: Gastroenterology    ERCP N/A 9/15/2017    Procedure: ENDOSCOPIC RETROGRADE CHOLANGIOPANCREATOGRAPHY (ERCP); Surgeon: Marlin House MD;  [facility];   Service: Gastroenterology    HYSTERECTOMY      KNEE SURGERY Right     LAPAROSCOPIC ENDOMETRIOSIS FULGURATION      ORIF ANKLE FRACTURE Left     NC KNEE SCOPE,MED/LAT MENISECTOMY Left 5/12/2017    Procedure: POSSIBLE MEDIAL MENISECTOMY;  Surgeon: David Ozuna MD;  [facility]: [facility];  Service: Orthopedics    NC KNEE [address] CART Left 5/12/2017    Procedure: KNEE ARTHROSCOPY EVALUATION, CHONDROPLASTY;  Surgeon: David Ozuna MD;  [facility];  Service: Orthopedics    NC LAP,DIAGNOSTIC ABDOMEN N/A 12/12/2017    Procedure: LAPAROSCOPY DIAGNOSTIC  WITH LYSIS OF ADHESIONS;  Surgeon: Paul Waddell DO;  [facility];  Service: General       Family History   Problem Relation Age of Onset    Ulcerative colitis Mother     Heart failure Father     Neuropathy Father     Macular degeneration Father     Diabetes Father     Asthma Daughter     Hypothyroidism Daughter     Heart disease Maternal Grandmother     Arthritis Maternal Grandmother     Arthritis Paternal Grandmother     Macular degeneration Paternal Grandmother     Lymphoma Paternal Grandfather     Heart failure Paternal Aunt     Heart failure Paternal [de-identified]     Breast cancer Paternal Aunt 53    Breast cancer additional onset Paternal Aunt 62    Hypothyroidism Paternal Aunt     Breast cancer Maternal Aunt     No Known Problems Maternal Grandfather        Allergies   Allergen Reactions    Amitriptyline Anaphylaxis     According to the patient she had nphylaxis    Aspergillus Fumigatus Other (See Comments), Hives and Swelling    Azathioprine Other (See Comments) and Anaphylaxis     pancreatitis  According to patient she needed Epipen    Buspar [Buspirone] Hives and Shortness Of Breath    Corn Dextrin Anaphylaxis     Any corn based products    Eggs Or Egg-Derived Products - Food Allergy Anaphylaxis    Gelatin - Food Allergy Anaphylaxis    Heparin Hives     Painful welts     Lactated Ringers Shortness Of Breath     Pt questions this allergy, pt has received LR multiple times without reaction    Malto Dextrin [Dextrin] Anaphylaxis    Other Anaphylaxis     Gel caps, maltodextrose, dextrose,grapes    Penicillium Notatum Shortness Of Breath and Swelling    Sumatriptan Anaphylaxis     Bradycardia, sob, back pressure, head pain,throat tightness  According to the patient she had anphylaxis    Contrast [Iodinated Diagnostic Agents] Other (See Comments)     Pt states needs to be premedicated prior to injection   [employer] - Food Allergy - Food Allergy Hives    Humira [Adalimumab] Other (See Comments)     "I develop drug induced lupus"    Ibuprofen Hives and Throat Swelling    Polyethylene Glycol Diarrhea and Vomiting    Red Dye - Food Allergy Other (See Comments)     intolerance    Remicade [Infliximab] Other (See Comments)     "I develop drug induced lupus"    Sulfa Antibiotics Hives and Other (See Comments)    Sulfate Hives    Sulfites - Food Allergy Hives    Chlorhexidine Itching    Histamine Hives, Rash and Other (See Comments)     Intolerance           Current Outpatient Medications:     Alfalfa 650 MG TABS, Take by mouth, Disp: , Rfl:     B-D 3CC LUER-SHAMEKA SYR 25GX1" 25G X 1" 3 ML MISC, , Disp: , Rfl:     butalbital-acetaminophen-caffeine (FIORICET,ESGIC) -40 mg per tablet, Take 1 tablet by mouth every 8 (eight) hours as needed for migraine, Disp: 20 tablet, Rfl: 0    cetirizine (ZyrTEC) 10 mg tablet, Take 20 mg by mouth daily, Disp: , Rfl:     cyanocobalamin 1,000 mcg/mL, , Disp: , Rfl:     diphenhydrAMINE (BENADRYL) 25 mg tablet, Take 50 mg by mouth 2 (two) times a day, Disp: , Rfl:     EPINEPHrine (EPIPEN 2-MARYSOL IJ), EpiPen  prn, Disp: , Rfl:     Erenumab-aooe (Aimovig) 140 MG/ML SOAJ, 140 mg, Disp: , Rfl:     ergocalciferol (VITAMIN D2) 50,000 units, , Disp: , Rfl:   famotidine (PEPCID) 40 MG tablet, Take 1 tablet (40 mg total) by mouth daily, Disp: 90 tablet, Rfl: 0    hydrOXYzine HCL (ATARAX) 25 mg tablet, Take 25 mg by mouth 4 (four) times a day , Disp: , Rfl:     Lactobacillus (PROBIOTIC ACIDOPHILUS PO), Take by mouth, Disp: , Rfl:     liothyronine (CYTOMEL) 5 mcg tablet, Take 5 mcg by mouth daily, Disp: , Rfl:     LORazepam (ATIVAN) 1 mg tablet, Take 1 tablet (1 mg total) by mouth every 8 (eight) hours as needed for anxiety, Disp: 21 tablet, Rfl: 0    montelukast (SINGULAIR) 10 mg tablet, Take 1 tablet (10 mg total) by mouth daily at bedtime, Disp: 30 tablet, Rfl: 0    nystatin (MYCOSTATIN) 500,000 units/5 mL suspension, , Disp: , Rfl:     omalizumab (XOLAIR) 150 mg, Inject 300 mg under the skin every 28 days , Disp: , Rfl:     ondansetron (ZOFRAN-ODT) 4 mg disintegrating tablet, Take 1 tablet (4 mg total) by mouth every 6 (six) hours as needed for nausea or vomiting, Disp: 20 tablet, Rfl: 0    pantoprazole (PROTONIX) 40 mg tablet, Take 1 tablet (40 mg total) by mouth 2 (two) times a day, Disp: 60 tablet, Rfl: 3    predniSONE 20 mg tablet, Take as instructed by physician   20mg as needed on days with symptoms, Disp: 100 tablet, Rfl: 0    Stelara 90 MG/ML subcutaneous injection, 90mg inject subcutaneously every 21 days, Disp: 1 mL, Rfl: 6    Synthroid 50 MCG tablet, Taking 1 5 mcg daily, Disp: , Rfl:     TechLite Lancets MISC, , Disp: , Rfl:     diclofenac (VOLTAREN) 75 mg EC tablet, Take 1 tablet (75 mg total) by mouth 2 (two) times a day for 21 days (Patient taking differently: Take 75 mg by mouth as needed ), Disp: 42 tablet, Rfl: 2    levalbuterol (XOPENEX HFA) 45 mcg/act inhaler, Inhale 1-2 puffs every 4 (four) hours as needed for shortness of breath (Patient not taking: No sig reported), Disp: , Rfl:     NON FORMULARY, immunoglobin sq 6g 30 ml once a week (Patient not taking: No sig reported), Disp: , Rfl:     Riboflavin (Vitamin B-2) 100 MG TABS, Take 4 tablets (400 mg total) by mouth daily, Disp: 360 tablet, Rfl: 3    simethicone (MYLICON) 710 MG chewable tablet, Chew 125 mg every 6 (six) hours as needed for flatulence (Patient not taking: No sig reported), Disp: , Rfl:     Synthroid 75 MCG tablet, , Disp: , Rfl:     Objective     Vitals:    09/19/22 0917   BP: 110/72   Pulse: 87   Resp: 16   Temp: 98 °F (36 7 °C)   SpO2: 97%   Weight: 67 6 kg (149 lb)   Height: 5' 5" (1 651 m)       Physical Exam  Vitals and nursing note reviewed  Constitutional:       General: She is not in acute distress  Appearance: Normal appearance  She is well-developed  She is not ill-appearing or toxic-appearing  HENT:      Head: Normocephalic and atraumatic  Eyes:      Extraocular Movements: Extraocular movements intact  Neck:      Thyroid: No thyromegaly  Cardiovascular:      Rate and Rhythm: Normal rate and regular rhythm  Heart sounds: Normal heart sounds  No murmur heard  Pulmonary:      Effort: Pulmonary effort is normal  No respiratory distress  Breath sounds: Normal breath sounds  No wheezing, rhonchi or rales  Genitourinary:     Comments: Exam deferred  Musculoskeletal:      Cervical back: Neck supple  Skin:     General: Skin is warm  Comments: Midback:  1 cm annular erythematous patch of dry skin located in the mid back at the midline  There is no open wound, bleeding or drainage  Area is mildly tender to palpation  There is some mild scaling of the lesion  Neurological:      General: No focal deficit present  Mental Status: She is alert and oriented to person, place, and time  Mental status is at baseline     Psychiatric:         Mood and Affect: Mood normal          Behavior: Behavior normal          Katrin Old, Kelton Jaime [facility]

## 2022-09-20 ENCOUNTER — HOSPITAL ENCOUNTER (OUTPATIENT)
Dept: INFUSION CENTER | Facility: HOSPITAL | Age: 47
Discharge: HOME | End: 2022-09-20
Payer: COMMERCIAL

## 2022-09-20 VITALS
TEMPERATURE: 97.4 F | DIASTOLIC BLOOD PRESSURE: 78 MMHG | HEART RATE: 86 BPM | RESPIRATION RATE: 17 BRPM | OXYGEN SATURATION: 99 % | SYSTOLIC BLOOD PRESSURE: 119 MMHG

## 2022-09-20 DIAGNOSIS — R19.7 DIARRHEA, UNSPECIFIED TYPE: ICD-10-CM

## 2022-09-20 DIAGNOSIS — E44.1 MILD PROTEIN-CALORIE MALNUTRITION (HCC): ICD-10-CM

## 2022-09-20 DIAGNOSIS — R63.0 ANOREXIA: Primary | ICD-10-CM

## 2022-09-20 PROCEDURE — 96361 HYDRATE IV INFUSION ADD-ON: CPT

## 2022-09-20 PROCEDURE — 96365 THER/PROPH/DIAG IV INF INIT: CPT

## 2022-09-20 PROCEDURE — 96375 TX/PRO/DX INJ NEW DRUG ADDON: CPT

## 2022-09-20 RX ORDER — METHYLPREDNISOLONE SODIUM SUCCINATE 125 MG/2ML
60 INJECTION, POWDER, LYOPHILIZED, FOR SOLUTION INTRAMUSCULAR; INTRAVENOUS ONCE
Status: COMPLETED | OUTPATIENT
Start: 2022-09-20 | End: 2022-09-20

## 2022-09-20 RX ORDER — METHYLPREDNISOLONE SODIUM SUCCINATE 125 MG/2ML
60 INJECTION, POWDER, LYOPHILIZED, FOR SOLUTION INTRAMUSCULAR; INTRAVENOUS ONCE
Status: CANCELLED
Start: 2022-09-21 | End: 2022-09-21

## 2022-09-20 RX ORDER — SODIUM CHLORIDE, SODIUM LACTATE, POTASSIUM CHLORIDE, CALCIUM CHLORIDE 600; 310; 30; 20 MG/100ML; MG/100ML; MG/100ML; MG/100ML
1000 INJECTION, SOLUTION INTRAVENOUS ONCE
Status: COMPLETED | OUTPATIENT
Start: 2022-09-20 | End: 2022-09-20

## 2022-09-20 RX ORDER — SODIUM CHLORIDE, SODIUM LACTATE, POTASSIUM CHLORIDE, CALCIUM CHLORIDE 600; 310; 30; 20 MG/100ML; MG/100ML; MG/100ML; MG/100ML
1000 INJECTION, SOLUTION INTRAVENOUS ONCE
Status: CANCELLED
Start: 2022-09-21 | End: 2022-09-21

## 2022-09-20 RX ADMIN — SODIUM CHLORIDE, SODIUM LACTATE, POTASSIUM CHLORIDE, AND CALCIUM CHLORIDE 1000 ML: .6; .31; .03; .02 INJECTION, SOLUTION INTRAVENOUS at 08:40

## 2022-09-20 RX ADMIN — DIPHENHYDRAMINE HYDROCHLORIDE 50 MG: 50 INJECTION, SOLUTION INTRAMUSCULAR; INTRAVENOUS at 09:25

## 2022-09-20 RX ADMIN — METHYLPREDNISOLONE SODIUM SUCCINATE 60 MG: 125 INJECTION, POWDER, FOR SOLUTION INTRAMUSCULAR; INTRAVENOUS at 11:09

## 2022-09-22 ENCOUNTER — HOSPITAL ENCOUNTER (OUTPATIENT)
Dept: INFUSION CENTER | Facility: CLINIC | Age: 47
Discharge: HOME | End: 2022-09-22
Payer: COMMERCIAL

## 2022-09-22 DIAGNOSIS — E44.1 MILD PROTEIN-CALORIE MALNUTRITION (HCC): ICD-10-CM

## 2022-09-22 DIAGNOSIS — R19.7 DIARRHEA, UNSPECIFIED TYPE: ICD-10-CM

## 2022-09-22 DIAGNOSIS — R63.0 ANOREXIA: Primary | ICD-10-CM

## 2022-09-22 PROCEDURE — 96361 HYDRATE IV INFUSION ADD-ON: CPT

## 2022-09-22 PROCEDURE — 96375 TX/PRO/DX INJ NEW DRUG ADDON: CPT

## 2022-09-22 PROCEDURE — 96365 THER/PROPH/DIAG IV INF INIT: CPT

## 2022-09-22 RX ORDER — SODIUM CHLORIDE, SODIUM LACTATE, POTASSIUM CHLORIDE, CALCIUM CHLORIDE 600; 310; 30; 20 MG/100ML; MG/100ML; MG/100ML; MG/100ML
1000 INJECTION, SOLUTION INTRAVENOUS ONCE
Status: CANCELLED
Start: 2022-09-26 | End: 2022-09-23

## 2022-09-22 RX ORDER — METHYLPREDNISOLONE SODIUM SUCCINATE 125 MG/2ML
60 INJECTION, POWDER, LYOPHILIZED, FOR SOLUTION INTRAMUSCULAR; INTRAVENOUS ONCE
Status: CANCELLED
Start: 2022-09-26 | End: 2022-09-23

## 2022-09-22 RX ORDER — METHYLPREDNISOLONE SODIUM SUCCINATE 125 MG/2ML
60 INJECTION, POWDER, LYOPHILIZED, FOR SOLUTION INTRAMUSCULAR; INTRAVENOUS ONCE
Status: COMPLETED | OUTPATIENT
Start: 2022-09-22 | End: 2022-09-22

## 2022-09-22 RX ORDER — SODIUM CHLORIDE, SODIUM LACTATE, POTASSIUM CHLORIDE, CALCIUM CHLORIDE 600; 310; 30; 20 MG/100ML; MG/100ML; MG/100ML; MG/100ML
1000 INJECTION, SOLUTION INTRAVENOUS ONCE
Status: COMPLETED | OUTPATIENT
Start: 2022-09-22 | End: 2022-09-22

## 2022-09-22 RX ADMIN — SODIUM CHLORIDE, SODIUM LACTATE, POTASSIUM CHLORIDE, AND CALCIUM CHLORIDE 1000 ML: .6; .31; .03; .02 INJECTION, SOLUTION INTRAVENOUS at 11:16

## 2022-09-22 RX ADMIN — DIPHENHYDRAMINE HYDROCHLORIDE 50 MG: 50 INJECTION, SOLUTION INTRAMUSCULAR; INTRAVENOUS at 11:26

## 2022-09-22 RX ADMIN — METHYLPREDNISOLONE SODIUM SUCCINATE 60 MG: 125 INJECTION, POWDER, FOR SOLUTION INTRAMUSCULAR; INTRAVENOUS at 11:23

## 2022-09-23 ENCOUNTER — TELEPHONE (OUTPATIENT)
Dept: GASTROENTEROLOGY | Facility: CLINIC | Age: 47
End: 2022-09-23

## 2022-09-23 ENCOUNTER — TELEPHONE (OUTPATIENT)
Dept: FAMILY MEDICINE CLINIC | Facility: CLINIC | Age: 47
End: 2022-09-23

## 2022-09-23 NOTE — TELEPHONE ENCOUNTER
Dr Jimmie Rizo has asked to reach out to Dr Nuha Street at [facility] phone number [phone]  Patient had requested that she receive her infusion treatments at [facility]  Per Dr Jimmie Rizo he had asked me to reach out to Dr Nuha Street @ [facility] who currently orders her infusion treatments at [facility] to see if he could write out specific orders with exact details so that Dr Jimmie Rizo could take over infusion treatment orders for this patient so that she can obtain treatments closer to home per her request    I have called and left 2 messages for nursing staff to return my call  The office and I have been playing phone tag

## 2022-09-23 NOTE — TELEPHONE ENCOUNTER
I spoke with Debra Espinal (practice ) and the prior auth person at Dr Sommer Juan office  They are very familiar with this patient and her infusion needs  They will speak with Dr Sommer Juan and will fax over all appropriate orders and recent office visits so that Dr Lelia Leggett has the records to order the appropriate infusion treatments   Dr Sommer Juan is to call Dr Lelia Leggett personally on Monday during lunchtime to discuss this very delicate case physician to physician

## 2022-09-23 NOTE — LETTER
September 23, 2022    Regarding:  Bashir Pérez  [address] [address]    Urgent  Attention Dr Arcelia Coronel  Fax # [phone]          Ms Leopoldo Boas is currently under my care for the management of her Crohn's disease  She is currently receiving the following infusions twice weekly:    1000ml lactate ringers to be given over 2 hours (at a rate of 500ml per hour)  50mg IV benadryl to be given over 20 minutes  60mg IV solumedral to be given over 3 minutes        If you have any questions or concerns, please don't hesitate to call      Sincerely,             Melly Jones MD        CC:   No Recipients

## 2022-09-23 NOTE — TELEPHONE ENCOUNTER
Hi,    Can you please fax the letter I wrote today:    It would state:  "Urgent  Attention Dr Carolyn Hernandez"      Fax # [phone]          Thank you!

## 2022-09-27 ENCOUNTER — HOSPITAL ENCOUNTER (OUTPATIENT)
Dept: INFUSION CENTER | Facility: HOSPITAL | Age: 47
Discharge: HOME | End: 2022-09-27
Payer: COMMERCIAL

## 2022-09-27 ENCOUNTER — APPOINTMENT (OUTPATIENT)
Dept: LAB | Facility: HOSPITAL | Age: 47
End: 2022-09-27
Attending: FAMILY MEDICINE
Payer: COMMERCIAL

## 2022-09-27 VITALS
SYSTOLIC BLOOD PRESSURE: 135 MMHG | OXYGEN SATURATION: 96 % | TEMPERATURE: 97.7 F | HEART RATE: 105 BPM | RESPIRATION RATE: 18 BRPM | DIASTOLIC BLOOD PRESSURE: 80 MMHG
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
## 2022-09-27 DIAGNOSIS — R30.0 DYSURIA: Primary | ICD-10-CM

## 2022-09-27 DIAGNOSIS — L50.9 URTICARIA, UNSPECIFIED: ICD-10-CM

## 2022-09-27 DIAGNOSIS — R19.7 DIARRHEA, UNSPECIFIED TYPE: ICD-10-CM

## 2022-09-27 DIAGNOSIS — R63.0 ANOREXIA: Primary | ICD-10-CM

## 2022-09-27 DIAGNOSIS — S30.860A TICK BITE OF BACK, INITIAL ENCOUNTER: ICD-10-CM

## 2022-09-27 DIAGNOSIS — R30.0 DYSURIA: ICD-10-CM

## 2022-09-27 DIAGNOSIS — E44.1 MILD PROTEIN-CALORIE MALNUTRITION (HCC): ICD-10-CM

## 2022-09-27 LAB
ALBUMIN SERPL BCP-MCNC: 3.3 G/DL (ref 3.5–5)
ALP SERPL-CCNC: 73 U/L (ref 46–116)
ALT SERPL W P-5'-P-CCNC: 37 U/L (ref 12–78)
ANION GAP SERPL CALCULATED.3IONS-SCNC: 9 MMOL/L (ref 4–13)
AST SERPL W P-5'-P-CCNC: 24 U/L (ref 5–45)
B BURGDOR IGG+IGM SER-ACNC: <0.2 AI
BACTERIA UR QL AUTO: NORMAL /HPF
BASOPHILS # BLD AUTO: 0.09 THOUSANDS/ΜL (ref 0–0.1)
BASOPHILS NFR BLD AUTO: 1 % (ref 0–1)
BILIRUB SERPL-MCNC: 0.29 MG/DL (ref 0.2–1)
BILIRUB UR QL STRIP: NEGATIVE
BUN SERPL-MCNC: 12 MG/DL (ref 5–25)
CALCIUM ALBUM COR SERPL-MCNC: 9.5 MG/DL (ref 8.3–10.1)
CALCIUM SERPL-MCNC: 8.9 MG/DL (ref 8.3–10.1)
CHLORIDE SERPL-SCNC: 102 MMOL/L (ref 96–108)
CLARITY UR: CLEAR
CO2 SERPL-SCNC: 27 MMOL/L (ref 21–32)
COLOR UR: YELLOW
CREAT SERPL-MCNC: 0.76 MG/DL (ref 0.6–1.3)
EOSINOPHIL # BLD AUTO: 0.11 THOUSAND/ΜL (ref 0–0.61)
EOSINOPHIL NFR BLD AUTO: 1 % (ref 0–6)
ERYTHROCYTE [DISTWIDTH] IN BLOOD BY AUTOMATED COUNT: 14.7 % (ref 11.6–15.1)
GFR SERPL CREATININE-BSD FRML MDRD: 92 ML/MIN/1.73SQ M
GLUCOSE SERPL-MCNC: 115 MG/DL (ref 65–140)
GLUCOSE UR STRIP-MCNC: ABNORMAL MG/DL
HCT VFR BLD AUTO: 42.8 % (ref 34.8–46.1)
HGB BLD-MCNC: 14.2 G/DL (ref 11.5–15.4)
HGB UR QL STRIP.AUTO: ABNORMAL
IMM GRANULOCYTES # BLD AUTO: 0.02 THOUSAND/UL (ref 0–0.2)
IMM GRANULOCYTES NFR BLD AUTO: 0 % (ref 0–2)
KETONES UR STRIP-MCNC: NEGATIVE MG/DL
LEUKOCYTE ESTERASE UR QL STRIP: NEGATIVE
LYMPHOCYTES # BLD AUTO: 2.05 THOUSANDS/ΜL (ref 0.6–4.47)
LYMPHOCYTES NFR BLD AUTO: 25 % (ref 14–44)
MCH RBC QN AUTO: 29.7 PG (ref 26.8–34.3)
MCHC RBC AUTO-ENTMCNC: 33.2 G/DL (ref 31.4–37.4)
MCV RBC AUTO: 90 FL (ref 82–98)
MONOCYTES # BLD AUTO: 0.71 THOUSAND/ΜL (ref 0.17–1.22)
MONOCYTES NFR BLD AUTO: 9 % (ref 4–12)
NEUTROPHILS # BLD AUTO: 5.1 THOUSANDS/ΜL (ref 1.85–7.62)
NEUTS SEG NFR BLD AUTO: 64 % (ref 43–75)
NITRITE UR QL STRIP: NEGATIVE
NON-SQ EPI CELLS URNS QL MICRO: NORMAL /HPF
NRBC BLD AUTO-RTO: 0 /100 WBCS
PH UR STRIP.AUTO: 6.5 [PH]
PLATELET # BLD AUTO: 530 THOUSANDS/UL (ref 149–390)
PMV BLD AUTO: 8.5 FL (ref 8.9–12.7)
POTASSIUM SERPL-SCNC: 4.1 MMOL/L (ref 3.5–5.3)
PROT SERPL-MCNC: 7.1 G/DL (ref 6.4–8.4)
PROT UR STRIP-MCNC: NEGATIVE MG/DL
RBC # BLD AUTO: 4.78 MILLION/UL (ref 3.81–5.12)
RBC #/AREA URNS AUTO: NORMAL /HPF
SODIUM SERPL-SCNC: 138 MMOL/L (ref 135–147)
SP GR UR STRIP.AUTO: <=1.005 (ref 1–1.03)
UROBILINOGEN UR QL STRIP.AUTO: 0.2 E.U./DL
WBC # BLD AUTO: 8.08 THOUSAND/UL (ref 4.31–10.16)
WBC #/AREA URNS AUTO: NORMAL /HPF

## 2022-09-27 PROCEDURE — 85025 COMPLETE CBC W/AUTO DIFF WBC: CPT

## 2022-09-27 PROCEDURE — 83520 IMMUNOASSAY QUANT NOS NONAB: CPT

## 2022-09-27 PROCEDURE — 81001 URINALYSIS AUTO W/SCOPE: CPT

## 2022-09-27 PROCEDURE — 86618 LYME DISEASE ANTIBODY: CPT

## 2022-09-27 PROCEDURE — 96361 HYDRATE IV INFUSION ADD-ON: CPT

## 2022-09-27 PROCEDURE — 36415 COLL VENOUS BLD VENIPUNCTURE: CPT

## 2022-09-27 PROCEDURE — 80053 COMPREHEN METABOLIC PANEL: CPT

## 2022-09-27 PROCEDURE — 96365 THER/PROPH/DIAG IV INF INIT: CPT

## 2022-09-27 PROCEDURE — 96360 HYDRATION IV INFUSION INIT: CPT

## 2022-09-27 PROCEDURE — 96375 TX/PRO/DX INJ NEW DRUG ADDON: CPT

## 2022-09-27 RX ORDER — GRANULES FOR ORAL 3 G/1
3 POWDER ORAL ONCE
Qty: 3 G | Refills: 0 | Status: SHIPPED | OUTPATIENT
Start: 2022-09-27 | End: 2022-09-27

## 2022-09-27 RX ORDER — METHYLPREDNISOLONE SODIUM SUCCINATE 125 MG/2ML
60 INJECTION, POWDER, LYOPHILIZED, FOR SOLUTION INTRAMUSCULAR; INTRAVENOUS ONCE
Status: CANCELLED
Start: 2022-09-28 | End: 2022-09-28

## 2022-09-27 RX ORDER — SODIUM CHLORIDE, SODIUM LACTATE, POTASSIUM CHLORIDE, CALCIUM CHLORIDE 600; 310; 30; 20 MG/100ML; MG/100ML; MG/100ML; MG/100ML
1000 INJECTION, SOLUTION INTRAVENOUS ONCE
Status: COMPLETED | OUTPATIENT
Start: 2022-09-27 | End: 2022-09-27

## 2022-09-27 RX ORDER — METHYLPREDNISOLONE SODIUM SUCCINATE 125 MG/2ML
60 INJECTION, POWDER, LYOPHILIZED, FOR SOLUTION INTRAMUSCULAR; INTRAVENOUS ONCE
Status: COMPLETED | OUTPATIENT
Start: 2022-09-27 | End: 2022-09-27

## 2022-09-27 RX ORDER — SODIUM CHLORIDE, SODIUM LACTATE, POTASSIUM CHLORIDE, CALCIUM CHLORIDE 600; 310; 30; 20 MG/100ML; MG/100ML; MG/100ML; MG/100ML
1000 INJECTION, SOLUTION INTRAVENOUS ONCE
Status: CANCELLED
Start: 2022-09-28 | End: 2022-09-28

## 2022-09-27 RX ADMIN — DIPHENHYDRAMINE HYDROCHLORIDE 50 MG: 50 INJECTION, SOLUTION INTRAMUSCULAR; INTRAVENOUS at 11:50

## 2022-09-27 RX ADMIN — METHYLPREDNISOLONE SODIUM SUCCINATE 60 MG: 125 INJECTION, POWDER, FOR SOLUTION INTRAMUSCULAR; INTRAVENOUS at 12:18

## 2022-09-27 RX ADMIN — SODIUM CHLORIDE, SODIUM LACTATE, POTASSIUM CHLORIDE, AND CALCIUM CHLORIDE 1000 ML: .6; .31; .03; .02 INJECTION, SOLUTION INTRAVENOUS at 08:50

## 2022-09-27 NOTE — PROGRESS NOTES
1020  Pt stated My IV is bubbling  IV stopped  Site infiltrated approx size of a jane  Cathlon removed intact  Manual pressure applied  IV restarted and fluids resumed

## 2022-09-29 ENCOUNTER — DOCTOR'S OFFICE (OUTPATIENT)
Dept: URBAN - NONMETROPOLITAN AREA CLINIC 1 | Facility: CLINIC | Age: 47
Setting detail: OPHTHALMOLOGY
End: 2022-09-29
Payer: COMMERCIAL

## 2022-09-29 ENCOUNTER — HOSPITAL ENCOUNTER (OUTPATIENT)
Dept: INFUSION CENTER | Facility: HOSPITAL | Age: 47
Discharge: HOME | End: 2022-09-29
Payer: COMMERCIAL

## 2022-09-29 ENCOUNTER — HOSPITAL ENCOUNTER (OUTPATIENT)
Dept: INFUSION CENTER | Facility: CLINIC | Age: 47
End: 2022-09-29

## 2022-09-29 ENCOUNTER — RX ONLY (RX ONLY)
Age: 47
End: 2022-09-29

## 2022-09-29 ENCOUNTER — PATIENT MESSAGE (OUTPATIENT)
Dept: FAMILY MEDICINE CLINIC | Facility: CLINIC | Age: 47
End: 2022-09-29

## 2022-09-29 VITALS
DIASTOLIC BLOOD PRESSURE: 79 MMHG | RESPIRATION RATE: 17 BRPM | OXYGEN SATURATION: 100 % | TEMPERATURE: 97.7 F | SYSTOLIC BLOOD PRESSURE: 121 MMHG | HEART RATE: 91 BPM

## 2022-09-29 DIAGNOSIS — R63.0 ANOREXIA: Primary | ICD-10-CM

## 2022-09-29 DIAGNOSIS — E44.1 MILD PROTEIN-CALORIE MALNUTRITION (HCC): ICD-10-CM

## 2022-09-29 DIAGNOSIS — H46.8: ICD-10-CM

## 2022-09-29 DIAGNOSIS — H25.13: ICD-10-CM

## 2022-09-29 DIAGNOSIS — R19.7 DIARRHEA, UNSPECIFIED TYPE: ICD-10-CM

## 2022-09-29 DIAGNOSIS — H43.813: ICD-10-CM

## 2022-09-29 DIAGNOSIS — H40.003: ICD-10-CM

## 2022-09-29 DIAGNOSIS — L50.1 CHRONIC IDIOPATHIC URTICARIA: Primary | ICD-10-CM

## 2022-09-29 PROBLEM — G45.3 AMAUROSIS FUGAX: Status: ACTIVE | Noted: 2021-01-18

## 2022-09-29 PROBLEM — H40.001 GLAUCOMA SUSPECT; RIGHT EYE, LEFT EYE: Status: ACTIVE | Noted: 2017-10-23

## 2022-09-29 PROBLEM — H40.002 GLAUCOMA SUSPECT; RIGHT EYE, LEFT EYE: Status: ACTIVE | Noted: 2017-10-23

## 2022-09-29 PROBLEM — H53.121: Status: ACTIVE | Noted: 2017-10-20

## 2022-09-29 PROBLEM — H43.811 POST VITREOUS DETACHMENT; RIGHT EYE, LEFT EYE: Status: ACTIVE | Noted: 2017-10-20

## 2022-09-29 PROBLEM — H53.122: Status: ACTIVE | Noted: 2017-10-20

## 2022-09-29 PROBLEM — H43.812 POST VITREOUS DETACHMENT; RIGHT EYE, LEFT EYE: Status: ACTIVE | Noted: 2017-10-20

## 2022-09-29 PROCEDURE — 96361 HYDRATE IV INFUSION ADD-ON: CPT

## 2022-09-29 PROCEDURE — 92250 FUNDUS PHOTOGRAPHY W/I&R: CPT | Performed by: OPHTHALMOLOGY

## 2022-09-29 PROCEDURE — 99213 OFFICE O/P EST LOW 20 MIN: CPT | Performed by: OPHTHALMOLOGY

## 2022-09-29 PROCEDURE — 96365 THER/PROPH/DIAG IV INF INIT: CPT

## 2022-09-29 PROCEDURE — 92134 CPTRZ OPH DX IMG PST SGM RTA: CPT | Performed by: OPHTHALMOLOGY

## 2022-09-29 PROCEDURE — 92083 EXTENDED VISUAL FIELD XM: CPT | Performed by: OPHTHALMOLOGY

## 2022-09-29 PROCEDURE — 92235 FLUORESCEIN ANGRPH MLTIFRAME: CPT | Performed by: OPHTHALMOLOGY

## 2022-09-29 PROCEDURE — 96375 TX/PRO/DX INJ NEW DRUG ADDON: CPT

## 2022-09-29 RX ORDER — METHYLPREDNISOLONE SODIUM SUCCINATE 125 MG/2ML
60 INJECTION, POWDER, LYOPHILIZED, FOR SOLUTION INTRAMUSCULAR; INTRAVENOUS ONCE
Status: CANCELLED
Start: 2022-09-30 | End: 2022-09-30

## 2022-09-29 RX ORDER — EPINEPHRINE 1 MG/ML
0.3 INJECTION, SOLUTION, CONCENTRATE INTRAVENOUS
OUTPATIENT
Start: 2022-10-21

## 2022-09-29 RX ORDER — SODIUM CHLORIDE, SODIUM LACTATE, POTASSIUM CHLORIDE, CALCIUM CHLORIDE 600; 310; 30; 20 MG/100ML; MG/100ML; MG/100ML; MG/100ML
1000 INJECTION, SOLUTION INTRAVENOUS ONCE
Status: COMPLETED | OUTPATIENT
Start: 2022-09-29 | End: 2022-09-29

## 2022-09-29 RX ORDER — SODIUM CHLORIDE, SODIUM LACTATE, POTASSIUM CHLORIDE, CALCIUM CHLORIDE 600; 310; 30; 20 MG/100ML; MG/100ML; MG/100ML; MG/100ML
1000 INJECTION, SOLUTION INTRAVENOUS ONCE
Status: CANCELLED
Start: 2022-09-30 | End: 2022-09-30

## 2022-09-29 RX ORDER — METHYLPREDNISOLONE SODIUM SUCCINATE 125 MG/2ML
60 INJECTION, POWDER, LYOPHILIZED, FOR SOLUTION INTRAMUSCULAR; INTRAVENOUS ONCE
Status: COMPLETED | OUTPATIENT
Start: 2022-09-29 | End: 2022-09-29

## 2022-09-29 RX ADMIN — SODIUM CHLORIDE, SODIUM LACTATE, POTASSIUM CHLORIDE, AND CALCIUM CHLORIDE 1000 ML: .6; .31; .03; .02 INJECTION, SOLUTION INTRAVENOUS at 13:10

## 2022-09-29 RX ADMIN — METHYLPREDNISOLONE SODIUM SUCCINATE 60 MG: 125 INJECTION, POWDER, FOR SOLUTION INTRAMUSCULAR; INTRAVENOUS at 15:01

## 2022-09-29 RX ADMIN — DIPHENHYDRAMINE HYDROCHLORIDE 50 MG: 50 INJECTION, SOLUTION INTRAMUSCULAR; INTRAVENOUS at 14:20

## 2022-09-29 ASSESSMENT — VISUAL ACUITY
OS_BCVA: 20/25
OD_BCVA: 20/25

## 2022-09-29 ASSESSMENT — CONFRONTATIONAL VISUAL FIELD TEST (CVF)
OS_FINDINGS: FULL
OD_FINDINGS: FULL

## 2022-09-30 ENCOUNTER — HOSPITAL ENCOUNTER (OUTPATIENT)
Dept: INFUSION CENTER | Facility: HOSPITAL | Age: 47
End: 2022-09-30
Attending: INTERNAL MEDICINE
Payer: COMMERCIAL

## 2022-09-30 ENCOUNTER — APPOINTMENT (OUTPATIENT)
Dept: LAB | Facility: HOSPITAL | Age: 47
End: 2022-09-30
Payer: COMMERCIAL

## 2022-09-30 VITALS
RESPIRATION RATE: 16 BRPM | OXYGEN SATURATION: 97 % | HEART RATE: 85 BPM | DIASTOLIC BLOOD PRESSURE: 78 MMHG | SYSTOLIC BLOOD PRESSURE: 117 MMHG | TEMPERATURE: 97.7 F

## 2022-09-30 DIAGNOSIS — R63.0 ANOREXIA: Primary | ICD-10-CM

## 2022-09-30 DIAGNOSIS — R19.7 DIARRHEA, UNSPECIFIED TYPE: ICD-10-CM

## 2022-09-30 DIAGNOSIS — E03.2 HYPOTHYROIDISM DUE TO DRUGS: ICD-10-CM

## 2022-09-30 DIAGNOSIS — K52.9 INFLAMMATORY BOWEL DISEASE: ICD-10-CM

## 2022-09-30 DIAGNOSIS — L50.9 URTICARIA: ICD-10-CM

## 2022-09-30 DIAGNOSIS — R73.9 HYPERGLYCEMIA: ICD-10-CM

## 2022-09-30 DIAGNOSIS — E44.1 MILD PROTEIN-CALORIE MALNUTRITION (HCC): ICD-10-CM

## 2022-09-30 DIAGNOSIS — D89.40 MAST CELL ACTIVATION, UNSPECIFIED (HCC): ICD-10-CM

## 2022-09-30 DIAGNOSIS — K50.119 CROHN'S DISEASE OF LARGE INTESTINE WITH COMPLICATION (HCC): ICD-10-CM

## 2022-09-30 DIAGNOSIS — Z86.16 HISTORY OF COVID-19: ICD-10-CM

## 2022-09-30 DIAGNOSIS — E05.90 THYROTOXICOSIS WITHOUT THYROID STORM, UNSPECIFIED THYROTOXICOSIS TYPE: ICD-10-CM

## 2022-09-30 DIAGNOSIS — D84.9 IMMUNOSUPPRESSED STATUS (HCC): ICD-10-CM

## 2022-09-30 LAB
25(OH)D3 SERPL-MCNC: 31.5 NG/ML (ref 30–100)
ALBUMIN SERPL BCP-MCNC: 3.4 G/DL (ref 3.5–5)
ALP SERPL-CCNC: 68 U/L (ref 46–116)
ALT SERPL W P-5'-P-CCNC: 40 U/L (ref 12–78)
ANION GAP SERPL CALCULATED.3IONS-SCNC: 8 MMOL/L (ref 4–13)
AST SERPL W P-5'-P-CCNC: 22 U/L (ref 5–45)
BASOPHILS # BLD AUTO: 0.08 THOUSANDS/ΜL (ref 0–0.1)
BASOPHILS NFR BLD AUTO: 1 % (ref 0–1)
BILIRUB SERPL-MCNC: 0.19 MG/DL (ref 0.2–1)
BUN SERPL-MCNC: 18 MG/DL (ref 5–25)
CALCIUM ALBUM COR SERPL-MCNC: 9.5 MG/DL (ref 8.3–10.1)
CALCIUM SERPL-MCNC: 9 MG/DL (ref 8.3–10.1)
CHLORIDE SERPL-SCNC: 101 MMOL/L (ref 96–108)
CO2 SERPL-SCNC: 29 MMOL/L (ref 21–32)
CREAT SERPL-MCNC: 0.72 MG/DL (ref 0.6–1.3)
CRP SERPL QL: 0.9 MG/L
EOSINOPHIL # BLD AUTO: 0.05 THOUSAND/ΜL (ref 0–0.61)
EOSINOPHIL NFR BLD AUTO: 0 % (ref 0–6)
ERYTHROCYTE [DISTWIDTH] IN BLOOD BY AUTOMATED COUNT: 14.9 % (ref 11.6–15.1)
GFR SERPL CREATININE-BSD FRML MDRD: 98 ML/MIN/1.73SQ M
GLUCOSE SERPL-MCNC: 90 MG/DL (ref 65–140)
HBV CORE AB SER QL: NORMAL
HBV CORE IGM SER QL: NORMAL
HCT VFR BLD AUTO: 39.4 % (ref 34.8–46.1)
HGB BLD-MCNC: 13.3 G/DL (ref 11.5–15.4)
IMM GRANULOCYTES # BLD AUTO: 0.12 THOUSAND/UL (ref 0–0.2)
IMM GRANULOCYTES NFR BLD AUTO: 1 % (ref 0–2)
LDH SERPL-CCNC: 132 U/L (ref 81–234)
LYMPHOCYTES # BLD AUTO: 2.35 THOUSANDS/ΜL (ref 0.6–4.47)
LYMPHOCYTES NFR BLD AUTO: 14 % (ref 14–44)
MCH RBC QN AUTO: 30 PG (ref 26.8–34.3)
MCHC RBC AUTO-ENTMCNC: 33.8 G/DL (ref 31.4–37.4)
MCV RBC AUTO: 89 FL (ref 82–98)
MONOCYTES # BLD AUTO: 1.15 THOUSAND/ΜL (ref 0.17–1.22)
MONOCYTES NFR BLD AUTO: 7 % (ref 4–12)
NEUTROPHILS # BLD AUTO: 13.1 THOUSANDS/ΜL (ref 1.85–7.62)
NEUTS SEG NFR BLD AUTO: 77 % (ref 43–75)
NRBC BLD AUTO-RTO: 0 /100 WBCS
PLATELET # BLD AUTO: 497 THOUSANDS/UL (ref 149–390)
PMV BLD AUTO: 8.7 FL (ref 8.9–12.7)
POTASSIUM SERPL-SCNC: 3.3 MMOL/L (ref 3.5–5.3)
PROT SERPL-MCNC: 7.4 G/DL (ref 6.4–8.4)
RBC # BLD AUTO: 4.44 MILLION/UL (ref 3.81–5.12)
SARS-COV-2 IGG SERPL QL IA: REACTIVE
SARS-COV-2 IGG+IGM SERPL QL IA: REACTIVE
SODIUM SERPL-SCNC: 138 MMOL/L (ref 135–147)
T3FREE SERPL-MCNC: 2.01 PG/ML (ref 2.3–4.2)
TRYPTASE SERPL-MCNC: 11.5 UG/L (ref 2.2–13.2)
WBC # BLD AUTO: 16.85 THOUSAND/UL (ref 4.31–10.16)

## 2022-09-30 PROCEDURE — 85025 COMPLETE CBC W/AUTO DIFF WBC: CPT

## 2022-09-30 PROCEDURE — 80053 COMPREHEN METABOLIC PANEL: CPT

## 2022-09-30 PROCEDURE — 96375 TX/PRO/DX INJ NEW DRUG ADDON: CPT

## 2022-09-30 PROCEDURE — 96361 HYDRATE IV INFUSION ADD-ON: CPT

## 2022-09-30 PROCEDURE — 86705 HEP B CORE ANTIBODY IGM: CPT

## 2022-09-30 PROCEDURE — 87350 HEPATITIS BE AG IA: CPT

## 2022-09-30 PROCEDURE — 84481 FREE ASSAY (FT-3): CPT

## 2022-09-30 PROCEDURE — 86800 THYROGLOBULIN ANTIBODY: CPT

## 2022-09-30 PROCEDURE — 82306 VITAMIN D 25 HYDROXY: CPT

## 2022-09-30 PROCEDURE — 96365 THER/PROPH/DIAG IV INF INIT: CPT

## 2022-09-30 PROCEDURE — 36415 COLL VENOUS BLD VENIPUNCTURE: CPT

## 2022-09-30 PROCEDURE — 86140 C-REACTIVE PROTEIN: CPT

## 2022-09-30 PROCEDURE — 86480 TB TEST CELL IMMUN MEASURE: CPT

## 2022-09-30 PROCEDURE — 87517 HEPATITIS B DNA QUANT: CPT

## 2022-09-30 PROCEDURE — 83520 IMMUNOASSAY QUANT NOS NONAB: CPT

## 2022-09-30 PROCEDURE — 86704 HEP B CORE ANTIBODY TOTAL: CPT

## 2022-09-30 PROCEDURE — 83615 LACTATE (LD) (LDH) ENZYME: CPT

## 2022-09-30 PROCEDURE — 86769 SARS-COV-2 COVID-19 ANTIBODY: CPT

## 2022-09-30 RX ORDER — SODIUM CHLORIDE, SODIUM LACTATE, POTASSIUM CHLORIDE, CALCIUM CHLORIDE 600; 310; 30; 20 MG/100ML; MG/100ML; MG/100ML; MG/100ML
1000 INJECTION, SOLUTION INTRAVENOUS ONCE
Status: COMPLETED | OUTPATIENT
Start: 2022-09-30 | End: 2022-09-30

## 2022-09-30 RX ORDER — METHYLPREDNISOLONE SODIUM SUCCINATE 125 MG/2ML
60 INJECTION, POWDER, LYOPHILIZED, FOR SOLUTION INTRAMUSCULAR; INTRAVENOUS ONCE
Status: CANCELLED
Start: 2022-10-01 | End: 2022-10-01

## 2022-09-30 RX ORDER — METHYLPREDNISOLONE SODIUM SUCCINATE 40 MG/ML
60 INJECTION, POWDER, LYOPHILIZED, FOR SOLUTION INTRAMUSCULAR; INTRAVENOUS ONCE
Status: CANCELLED
Start: 2022-10-07 | End: 2022-10-07

## 2022-09-30 RX ORDER — SODIUM CHLORIDE, SODIUM LACTATE, POTASSIUM CHLORIDE, CALCIUM CHLORIDE 600; 310; 30; 20 MG/100ML; MG/100ML; MG/100ML; MG/100ML
1000 INJECTION, SOLUTION INTRAVENOUS ONCE
Status: CANCELLED
Start: 2022-10-07 | End: 2022-10-07

## 2022-09-30 RX ORDER — METHYLPREDNISOLONE SODIUM SUCCINATE 125 MG/2ML
60 INJECTION, POWDER, LYOPHILIZED, FOR SOLUTION INTRAMUSCULAR; INTRAVENOUS ONCE
Status: COMPLETED | OUTPATIENT
Start: 2022-09-30 | End: 2022-09-30

## 2022-09-30 RX ORDER — SODIUM CHLORIDE, SODIUM LACTATE, POTASSIUM CHLORIDE, CALCIUM CHLORIDE 600; 310; 30; 20 MG/100ML; MG/100ML; MG/100ML; MG/100ML
1000 INJECTION, SOLUTION INTRAVENOUS ONCE
Status: CANCELLED
Start: 2022-10-01 | End: 2022-10-01

## 2022-09-30 RX ORDER — SODIUM CHLORIDE, SODIUM LACTATE, POTASSIUM CHLORIDE, CALCIUM CHLORIDE 600; 310; 30; 20 MG/100ML; MG/100ML; MG/100ML; MG/100ML
1000 INJECTION, SOLUTION INTRAVENOUS ONCE
Status: CANCELLED
Start: 2022-10-03 | End: 2022-10-03

## 2022-09-30 RX ORDER — METHYLPREDNISOLONE SODIUM SUCCINATE 125 MG/2ML
60 INJECTION, POWDER, LYOPHILIZED, FOR SOLUTION INTRAMUSCULAR; INTRAVENOUS ONCE
Status: CANCELLED
Start: 2022-10-03 | End: 2022-10-03

## 2022-09-30 RX ADMIN — DIPHENHYDRAMINE HYDROCHLORIDE 50 MG: 50 INJECTION, SOLUTION INTRAMUSCULAR; INTRAVENOUS at 08:52

## 2022-09-30 RX ADMIN — METHYLPREDNISOLONE SODIUM SUCCINATE 60 MG: 125 INJECTION, POWDER, FOR SOLUTION INTRAMUSCULAR; INTRAVENOUS at 11:12

## 2022-09-30 RX ADMIN — SODIUM CHLORIDE, SODIUM LACTATE, POTASSIUM CHLORIDE, AND CALCIUM CHLORIDE 1000 ML: .6; .31; .03; .02 INJECTION, SOLUTION INTRAVENOUS at 08:35

## 2022-09-30 NOTE — PROGRESS NOTES
Pt tolerated infusion well  Pt requested infusion 3 times next week as well  Reached out to Dr Cesar Irene and he approved  Pt aware of same  Discharged in satisfactory condition

## 2022-10-01 LAB
HBV E AG SERPL QL IA: NEGATIVE
THYROGLOB AB SERPL-ACNC: 6.2 IU/ML (ref 0–0.9)

## 2022-10-03 ENCOUNTER — HOSPITAL ENCOUNTER (OUTPATIENT)
Dept: INFUSION CENTER | Facility: HOSPITAL | Age: 47
Discharge: HOME | End: 2022-10-03
Payer: COMMERCIAL

## 2022-10-03 VITALS
DIASTOLIC BLOOD PRESSURE: 81 MMHG | OXYGEN SATURATION: 96 % | TEMPERATURE: 98.2 F | SYSTOLIC BLOOD PRESSURE: 112 MMHG | RESPIRATION RATE: 18 BRPM | HEART RATE: 111 BPM

## 2022-10-03 DIAGNOSIS — E44.1 MILD PROTEIN-CALORIE MALNUTRITION (HCC): ICD-10-CM

## 2022-10-03 DIAGNOSIS — G43.809 OTHER MIGRAINE WITHOUT STATUS MIGRAINOSUS, NOT INTRACTABLE: ICD-10-CM

## 2022-10-03 DIAGNOSIS — R63.0 ANOREXIA: Primary | ICD-10-CM

## 2022-10-03 DIAGNOSIS — R19.7 DIARRHEA, UNSPECIFIED TYPE: ICD-10-CM

## 2022-10-03 LAB
GAMMA INTERFERON BACKGROUND BLD IA-ACNC: 0.03 IU/ML
M TB IFN-G BLD-IMP: NEGATIVE
M TB IFN-G CD4+ BCKGRND COR BLD-ACNC: 0 IU/ML
M TB IFN-G CD4+ BCKGRND COR BLD-ACNC: 0 IU/ML
MITOGEN IGNF BCKGRD COR BLD-ACNC: 2.16 IU/ML
TRYPTASE SERPL-MCNC: 10 UG/L (ref 2.2–13.2)

## 2022-10-03 PROCEDURE — 96365 THER/PROPH/DIAG IV INF INIT: CPT

## 2022-10-03 PROCEDURE — 96375 TX/PRO/DX INJ NEW DRUG ADDON: CPT

## 2022-10-03 PROCEDURE — 96361 HYDRATE IV INFUSION ADD-ON: CPT

## 2022-10-03 RX ORDER — METHYLPREDNISOLONE SODIUM SUCCINATE 125 MG/2ML
60 INJECTION, POWDER, LYOPHILIZED, FOR SOLUTION INTRAMUSCULAR; INTRAVENOUS ONCE
Status: COMPLETED | OUTPATIENT
Start: 2022-10-03 | End: 2022-10-03

## 2022-10-03 RX ORDER — METHYLPREDNISOLONE SODIUM SUCCINATE 125 MG/2ML
60 INJECTION, POWDER, LYOPHILIZED, FOR SOLUTION INTRAMUSCULAR; INTRAVENOUS ONCE
Status: CANCELLED
Start: 2022-10-04 | End: 2022-10-04

## 2022-10-03 RX ORDER — SODIUM CHLORIDE, SODIUM LACTATE, POTASSIUM CHLORIDE, CALCIUM CHLORIDE 600; 310; 30; 20 MG/100ML; MG/100ML; MG/100ML; MG/100ML
1000 INJECTION, SOLUTION INTRAVENOUS ONCE
Status: COMPLETED | OUTPATIENT
Start: 2022-10-03 | End: 2022-10-03

## 2022-10-03 RX ORDER — SODIUM CHLORIDE, SODIUM LACTATE, POTASSIUM CHLORIDE, CALCIUM CHLORIDE 600; 310; 30; 20 MG/100ML; MG/100ML; MG/100ML; MG/100ML
1000 INJECTION, SOLUTION INTRAVENOUS ONCE
Status: CANCELLED
Start: 2022-10-04 | End: 2022-10-04

## 2022-10-03 RX ADMIN — METHYLPREDNISOLONE SODIUM SUCCINATE 60 MG: 125 INJECTION, POWDER, FOR SOLUTION INTRAMUSCULAR; INTRAVENOUS at 13:56

## 2022-10-03 RX ADMIN — SODIUM CHLORIDE, SODIUM LACTATE, POTASSIUM CHLORIDE, AND CALCIUM CHLORIDE 1000 ML: .6; .31; .03; .02 INJECTION, SOLUTION INTRAVENOUS at 11:28

## 2022-10-03 RX ADMIN — DIPHENHYDRAMINE HYDROCHLORIDE 50 MG: 50 INJECTION, SOLUTION INTRAMUSCULAR; INTRAVENOUS at 13:15

## 2022-10-04 RX ORDER — BUTALBITAL, ACETAMINOPHEN AND CAFFEINE 50; 325; 40 MG/1; MG/1; MG/1
1 TABLET ORAL EVERY 8 HOURS PRN
Qty: 20 TABLET | Refills: 0 | Status: SHIPPED | OUTPATIENT
Start: 2022-10-04

## 2022-10-05 ENCOUNTER — HOSPITAL ENCOUNTER (OUTPATIENT)
Dept: INFUSION CENTER | Facility: CLINIC | Age: 47
Discharge: HOME | End: 2022-10-05
Payer: COMMERCIAL

## 2022-10-05 ENCOUNTER — OFFICE VISIT (OUTPATIENT)
Dept: UROLOGY | Facility: MEDICAL CENTER | Age: 47
End: 2022-10-05
Payer: COMMERCIAL

## 2022-10-05 VITALS
WEIGHT: 152 LBS | HEART RATE: 104 BPM | SYSTOLIC BLOOD PRESSURE: 120 MMHG | BODY MASS INDEX: 25.33 KG/M2 | DIASTOLIC BLOOD PRESSURE: 80 MMHG | HEIGHT: 65 IN

## 2022-10-05 VITALS
RESPIRATION RATE: 18 BRPM | TEMPERATURE: 98.2 F | HEART RATE: 105 BPM | DIASTOLIC BLOOD PRESSURE: 85 MMHG | SYSTOLIC BLOOD PRESSURE: 129 MMHG

## 2022-10-05 DIAGNOSIS — R19.7 DIARRHEA, UNSPECIFIED TYPE: ICD-10-CM

## 2022-10-05 DIAGNOSIS — R31.29 MICROSCOPIC HEMATURIA: ICD-10-CM

## 2022-10-05 DIAGNOSIS — N20.0 BILATERAL KIDNEY STONES: Primary | ICD-10-CM

## 2022-10-05 DIAGNOSIS — R31.0 GROSS HEMATURIA: ICD-10-CM

## 2022-10-05 DIAGNOSIS — R63.0 ANOREXIA: Primary | ICD-10-CM

## 2022-10-05 DIAGNOSIS — E44.1 MILD PROTEIN-CALORIE MALNUTRITION (HCC): ICD-10-CM

## 2022-10-05 LAB
Lab: NORMAL
MISCELLANEOUS LAB TEST RESULT: NORMAL
SL AMB  POCT GLUCOSE, UA: ABNORMAL
SL AMB LEUKOCYTE ESTERASE,UA: ABNORMAL
SL AMB POCT BILIRUBIN,UA: ABNORMAL
SL AMB POCT BLOOD,UA: ABNORMAL
SL AMB POCT CLARITY,UA: CLEAR
SL AMB POCT COLOR,UA: YELLOW
SL AMB POCT KETONES,UA: ABNORMAL
SL AMB POCT NITRITE,UA: ABNORMAL
SL AMB POCT PH,UA: 6
SL AMB POCT SPECIFIC GRAVITY,UA: 1.02
SL AMB POCT URINE PROTEIN: ABNORMAL
SL AMB POCT UROBILINOGEN: 0.2
STONE ANALYSIS-IMP: NORMAL

## 2022-10-05 PROCEDURE — 81003 URINALYSIS AUTO W/O SCOPE: CPT | Performed by: UROLOGY

## 2022-10-05 PROCEDURE — 99204 OFFICE O/P NEW MOD 45 MIN: CPT | Performed by: UROLOGY

## 2022-10-05 PROCEDURE — 96365 THER/PROPH/DIAG IV INF INIT: CPT

## 2022-10-05 PROCEDURE — 96375 TX/PRO/DX INJ NEW DRUG ADDON: CPT

## 2022-10-05 PROCEDURE — 96361 HYDRATE IV INFUSION ADD-ON: CPT

## 2022-10-05 RX ORDER — METHYLPREDNISOLONE SODIUM SUCCINATE 125 MG/2ML
60 INJECTION, POWDER, LYOPHILIZED, FOR SOLUTION INTRAMUSCULAR; INTRAVENOUS ONCE
Status: COMPLETED | OUTPATIENT
Start: 2022-10-05 | End: 2022-10-05

## 2022-10-05 RX ORDER — SODIUM CHLORIDE, SODIUM LACTATE, POTASSIUM CHLORIDE, CALCIUM CHLORIDE 600; 310; 30; 20 MG/100ML; MG/100ML; MG/100ML; MG/100ML
1000 INJECTION, SOLUTION INTRAVENOUS ONCE
Status: COMPLETED | OUTPATIENT
Start: 2022-10-05 | End: 2022-10-05

## 2022-10-05 RX ORDER — METHYLPREDNISOLONE SODIUM SUCCINATE 125 MG/2ML
60 INJECTION, POWDER, LYOPHILIZED, FOR SOLUTION INTRAMUSCULAR; INTRAVENOUS ONCE
Status: CANCELLED
Start: 2022-10-06 | End: 2022-10-06

## 2022-10-05 RX ORDER — SODIUM CHLORIDE, SODIUM LACTATE, POTASSIUM CHLORIDE, CALCIUM CHLORIDE 600; 310; 30; 20 MG/100ML; MG/100ML; MG/100ML; MG/100ML
1000 INJECTION, SOLUTION INTRAVENOUS ONCE
Status: CANCELLED
Start: 2022-10-06 | End: 2022-10-06

## 2022-10-05 RX ORDER — FUROSEMIDE 20 MG/1
TABLET ORAL AS NEEDED
COMMUNITY
Start: 2022-10-01

## 2022-10-05 RX ADMIN — SODIUM CHLORIDE, SODIUM LACTATE, POTASSIUM CHLORIDE, AND CALCIUM CHLORIDE 1000 ML: .6; .31; .03; .02 INJECTION, SOLUTION INTRAVENOUS at 12:08

## 2022-10-05 RX ADMIN — DIPHENHYDRAMINE HYDROCHLORIDE 50 MG: 50 INJECTION, SOLUTION INTRAMUSCULAR; INTRAVENOUS at 12:27

## 2022-10-05 RX ADMIN — METHYLPREDNISOLONE SODIUM SUCCINATE 60 MG: 125 INJECTION, POWDER, FOR SOLUTION INTRAMUSCULAR; INTRAVENOUS at 14:11

## 2022-10-05 NOTE — PROGRESS NOTES
Assessment/Plan:  1  Bilateral kidney stones-patient has some left CVA discomfort however it is not necessarily described as a colicky type of pain and may in fact be musculoskeletal   The patient has stable stones on CT in April of 2022 as well as November of 2021  Repeat CT renal protocol because of a questionable history of gross hematuria    2  Questionable history of gross hematuria-patient did note seeing some blood on toilet tissue after voiding but no actual blood in the urine  3  History of trace microscopic hematuria-urinalysis now negative for any hematuria  No problem-specific Assessment & Plan notes found for this encounter  Diagnoses and all orders for this visit:    Bilateral kidney stones  -     POCT urine dip auto non-scope  -     CT renal protocol; Future  -     Basic metabolic panel    Gross hematuria  -     CT renal protocol; Future  -     Cystoscopy; Future    Microscopic hematuria  -     CT renal protocol; Future  -     Cystoscopy; Future    Other orders  -     furosemide (LASIX) 20 mg tablet; as needed          Subjective:      Patient ID: Carlos Alberto Alvarenga is a 50 y o  female  HPI  66-year-old female with a history of mast cell disease presents with a history of bilateral renal stones some vague left abdominal pain with some radiation anteriorly a questionable history of gross hematuria on toilet tissue and a history of trace microscopic hematuria  The patient also notes times when she does not void very frequently often going a day without voiding but other times when she voids frequently  She notes that she gets intravenous fluids because of dehydration in this temporarily is associated with her gaps in voiding    The following portions of the patient's history were reviewed and updated as appropriate: allergies, current medications, past family history, past medical history, past social history, past surgical history and problem list     Review of Systems   Genitourinary: Variable urinary habits sometimes not voiding for extended periods secondary to dehydration and other times with urinary frequency 1 well hydrated    No visible blood in urine however patient has had history of microscopic hematuria and notes blood on the toilet tissue after voiding on least 1 occasion   All other systems reviewed and are negative  Objective:      /80   Pulse 104   Ht 5' 5 25" (1 657 m)   Wt 68 9 kg (152 lb)   BMI 25 10 kg/m²          Physical Exam  Vitals reviewed  Constitutional:       General: She is not in acute distress  Appearance: Normal appearance  She is not ill-appearing, toxic-appearing or diaphoretic  HENT:      Head: Normocephalic and atraumatic  Mouth/Throat:      Mouth: Mucous membranes are moist    Eyes:      Extraocular Movements: Extraocular movements intact  Pulmonary:      Effort: Pulmonary effort is normal    Abdominal:      Palpations: Abdomen is soft  Neurological:      Mental Status: She is alert and oriented to person, place, and time  Psychiatric:         Mood and Affect: Mood normal          Behavior: Behavior normal          Thought Content:  Thought content normal          Judgment: Judgment normal

## 2022-10-06 ENCOUNTER — HOSPITAL ENCOUNTER (OUTPATIENT)
Dept: INFUSION CENTER | Facility: HOSPITAL | Age: 47
Discharge: HOME | End: 2022-10-06
Payer: COMMERCIAL

## 2022-10-06 ENCOUNTER — HOSPITAL ENCOUNTER (OUTPATIENT)
Dept: MAMMOGRAPHY | Facility: HOSPITAL | Age: 47
Discharge: HOME | End: 2022-10-06
Payer: COMMERCIAL

## 2022-10-06 ENCOUNTER — HOSPITAL ENCOUNTER (OUTPATIENT)
Dept: ULTRASOUND IMAGING | Facility: HOSPITAL | Age: 47
Discharge: HOME | End: 2022-10-06
Payer: COMMERCIAL

## 2022-10-06 VITALS
HEART RATE: 90 BPM | OXYGEN SATURATION: 98 % | SYSTOLIC BLOOD PRESSURE: 145 MMHG | TEMPERATURE: 96.8 F | RESPIRATION RATE: 18 BRPM | DIASTOLIC BLOOD PRESSURE: 78 MMHG

## 2022-10-06 VITALS — HEIGHT: 65 IN | BODY MASS INDEX: 25.31 KG/M2 | WEIGHT: 151.9 LBS

## 2022-10-06 DIAGNOSIS — M79.621 AXILLARY PAIN, RIGHT: ICD-10-CM

## 2022-10-06 DIAGNOSIS — N64.4 BREAST PAIN: ICD-10-CM

## 2022-10-06 DIAGNOSIS — M79.622 AXILLARY PAIN, LEFT: ICD-10-CM

## 2022-10-06 DIAGNOSIS — R63.0 ANOREXIA: Primary | ICD-10-CM

## 2022-10-06 DIAGNOSIS — R19.7 DIARRHEA, UNSPECIFIED TYPE: ICD-10-CM

## 2022-10-06 DIAGNOSIS — N64.4 BREAST PAIN, LEFT: ICD-10-CM

## 2022-10-06 DIAGNOSIS — E44.1 MILD PROTEIN-CALORIE MALNUTRITION (HCC): ICD-10-CM

## 2022-10-06 PROCEDURE — 96361 HYDRATE IV INFUSION ADD-ON: CPT

## 2022-10-06 PROCEDURE — 96375 TX/PRO/DX INJ NEW DRUG ADDON: CPT

## 2022-10-06 PROCEDURE — G0279 TOMOSYNTHESIS, MAMMO: HCPCS

## 2022-10-06 PROCEDURE — 77066 DX MAMMO INCL CAD BI: CPT

## 2022-10-06 PROCEDURE — 96365 THER/PROPH/DIAG IV INF INIT: CPT

## 2022-10-06 RX ORDER — SODIUM CHLORIDE, SODIUM LACTATE, POTASSIUM CHLORIDE, CALCIUM CHLORIDE 600; 310; 30; 20 MG/100ML; MG/100ML; MG/100ML; MG/100ML
1000 INJECTION, SOLUTION INTRAVENOUS ONCE
Status: CANCELLED
Start: 2022-10-07 | End: 2022-10-07

## 2022-10-06 RX ORDER — METHYLPREDNISOLONE SODIUM SUCCINATE 125 MG/2ML
60 INJECTION, POWDER, LYOPHILIZED, FOR SOLUTION INTRAMUSCULAR; INTRAVENOUS ONCE
Status: CANCELLED
Start: 2022-10-07 | End: 2022-10-07

## 2022-10-06 RX ORDER — SODIUM CHLORIDE, SODIUM LACTATE, POTASSIUM CHLORIDE, CALCIUM CHLORIDE 600; 310; 30; 20 MG/100ML; MG/100ML; MG/100ML; MG/100ML
1000 INJECTION, SOLUTION INTRAVENOUS ONCE
Status: COMPLETED | OUTPATIENT
Start: 2022-10-06 | End: 2022-10-06

## 2022-10-06 RX ORDER — METHYLPREDNISOLONE SODIUM SUCCINATE 125 MG/2ML
60 INJECTION, POWDER, LYOPHILIZED, FOR SOLUTION INTRAMUSCULAR; INTRAVENOUS ONCE
Status: COMPLETED | OUTPATIENT
Start: 2022-10-06 | End: 2022-10-06

## 2022-10-06 RX ADMIN — DIPHENHYDRAMINE HYDROCHLORIDE 50 MG: 50 INJECTION, SOLUTION INTRAMUSCULAR; INTRAVENOUS at 11:05

## 2022-10-06 RX ADMIN — METHYLPREDNISOLONE SODIUM SUCCINATE 60 MG: 125 INJECTION, POWDER, FOR SOLUTION INTRAMUSCULAR; INTRAVENOUS at 13:29

## 2022-10-06 RX ADMIN — SODIUM CHLORIDE, SODIUM LACTATE, POTASSIUM CHLORIDE, AND CALCIUM CHLORIDE 1000 ML: .6; .31; .03; .02 INJECTION, SOLUTION INTRAVENOUS at 10:40

## 2022-10-18 ENCOUNTER — HOSPITAL ENCOUNTER (OUTPATIENT)
Dept: INFUSION CENTER | Facility: HOSPITAL | Age: 47
Discharge: HOME | End: 2022-10-18
Attending: INTERNAL MEDICINE
Payer: COMMERCIAL

## 2022-10-18 VITALS
TEMPERATURE: 97.5 F | SYSTOLIC BLOOD PRESSURE: 133 MMHG | OXYGEN SATURATION: 96 % | DIASTOLIC BLOOD PRESSURE: 77 MMHG | HEART RATE: 79 BPM | RESPIRATION RATE: 17 BRPM

## 2022-10-18 DIAGNOSIS — R19.7 DIARRHEA, UNSPECIFIED TYPE: ICD-10-CM

## 2022-10-18 DIAGNOSIS — R63.0 ANOREXIA: Primary | ICD-10-CM

## 2022-10-18 DIAGNOSIS — K50.119 CROHN'S DISEASE OF COLON WITH COMPLICATION (HCC): Primary | ICD-10-CM

## 2022-10-18 DIAGNOSIS — E44.1 MILD PROTEIN-CALORIE MALNUTRITION (HCC): ICD-10-CM

## 2022-10-18 DIAGNOSIS — K50.119 CROHN'S DISEASE OF COLON WITH COMPLICATION (HCC): ICD-10-CM

## 2022-10-18 PROCEDURE — 96361 HYDRATE IV INFUSION ADD-ON: CPT

## 2022-10-18 PROCEDURE — 96375 TX/PRO/DX INJ NEW DRUG ADDON: CPT

## 2022-10-18 PROCEDURE — 96365 THER/PROPH/DIAG IV INF INIT: CPT

## 2022-10-18 RX ORDER — SODIUM CHLORIDE, SODIUM LACTATE, POTASSIUM CHLORIDE, CALCIUM CHLORIDE 600; 310; 30; 20 MG/100ML; MG/100ML; MG/100ML; MG/100ML
1000 INJECTION, SOLUTION INTRAVENOUS ONCE
Status: CANCELLED
Start: 2022-10-19 | End: 2022-10-19

## 2022-10-18 RX ORDER — SODIUM CHLORIDE, SODIUM LACTATE, POTASSIUM CHLORIDE, CALCIUM CHLORIDE 600; 310; 30; 20 MG/100ML; MG/100ML; MG/100ML; MG/100ML
1000 INJECTION, SOLUTION INTRAVENOUS ONCE
Status: DISCONTINUED | OUTPATIENT
Start: 2022-10-18 | End: 2022-10-21 | Stop reason: HOSPADM

## 2022-10-18 RX ORDER — METHYLPREDNISOLONE SODIUM SUCCINATE 125 MG/2ML
60 INJECTION, POWDER, LYOPHILIZED, FOR SOLUTION INTRAMUSCULAR; INTRAVENOUS ONCE
Status: COMPLETED | OUTPATIENT
Start: 2022-10-18 | End: 2022-10-18

## 2022-10-18 RX ORDER — METHYLPREDNISOLONE SODIUM SUCCINATE 125 MG/2ML
60 INJECTION, POWDER, LYOPHILIZED, FOR SOLUTION INTRAMUSCULAR; INTRAVENOUS ONCE
Status: CANCELLED
Start: 2022-10-19 | End: 2022-10-19

## 2022-10-18 RX ORDER — METHYLPREDNISOLONE SODIUM SUCCINATE 40 MG/ML
60 INJECTION, POWDER, LYOPHILIZED, FOR SOLUTION INTRAMUSCULAR; INTRAVENOUS ONCE
Status: CANCELLED
Start: 2022-10-19 | End: 2022-10-19

## 2022-10-18 RX ADMIN — SODIUM CHLORIDE, SODIUM LACTATE, POTASSIUM CHLORIDE, AND CALCIUM CHLORIDE 500 ML: .6; .31; .03; .02 INJECTION, SOLUTION INTRAVENOUS at 12:15

## 2022-10-18 RX ADMIN — METHYLPREDNISOLONE SODIUM SUCCINATE 60 MG: 125 INJECTION, POWDER, FOR SOLUTION INTRAMUSCULAR; INTRAVENOUS at 12:05

## 2022-10-18 RX ADMIN — DIPHENHYDRAMINE HYDROCHLORIDE 50 MG: 50 INJECTION, SOLUTION INTRAMUSCULAR; INTRAVENOUS at 11:06

## 2022-10-18 NOTE — PROGRESS NOTES
1040 IV fluid completed  Went to administer Benadryl and discovered that 1000 ml NSS had infused and not 1000 ml Lactated Ringer's solution  Pt denies any symptoms such as itchiness or hives  1045 Attempted to notify Dr Thalia Calderon who was unavailable  1049 Reached out to GI office  Spoke with Cedrick Aparicio NP at [phone] and explained above  Instructed to administer the benadryl and Solu medrol as previously ordered  When both meds completed to observe pt for 1 hour for itchiness or hives  If no symptoms may discharge the pt  1105 Pt notified of above  Pt stated that she does not have a corn allergy issue with NSS but she has a fluid retention issue with NSS if given repeatedly twice weekly  Pt asked that since she needs to be observed x 1 hour can she have 500 ml Lactated Ringer's solution during that hour to off balance the NSS  1122 Spoke with Cedrick Aparicio NP regarding pt request  [employer] agreed to pt request and placed orders

## 2022-10-20 ENCOUNTER — HOSPITAL ENCOUNTER (OUTPATIENT)
Dept: INFUSION CENTER | Facility: HOSPITAL | Age: 47
Discharge: HOME | End: 2022-10-20
Payer: COMMERCIAL

## 2022-10-20 VITALS — TEMPERATURE: 97.6 F

## 2022-10-20 DIAGNOSIS — R63.0 ANOREXIA: Primary | ICD-10-CM

## 2022-10-20 DIAGNOSIS — R19.7 DIARRHEA, UNSPECIFIED TYPE: ICD-10-CM

## 2022-10-20 DIAGNOSIS — E44.1 MILD PROTEIN-CALORIE MALNUTRITION (HCC): ICD-10-CM

## 2022-10-20 PROCEDURE — 96375 TX/PRO/DX INJ NEW DRUG ADDON: CPT

## 2022-10-20 PROCEDURE — 96365 THER/PROPH/DIAG IV INF INIT: CPT

## 2022-10-20 PROCEDURE — 96361 HYDRATE IV INFUSION ADD-ON: CPT

## 2022-10-20 RX ORDER — METHYLPREDNISOLONE SODIUM SUCCINATE 125 MG/2ML
60 INJECTION, POWDER, LYOPHILIZED, FOR SOLUTION INTRAMUSCULAR; INTRAVENOUS ONCE
Status: COMPLETED | OUTPATIENT
Start: 2022-10-20 | End: 2022-10-20

## 2022-10-20 RX ORDER — SODIUM CHLORIDE, SODIUM LACTATE, POTASSIUM CHLORIDE, CALCIUM CHLORIDE 600; 310; 30; 20 MG/100ML; MG/100ML; MG/100ML; MG/100ML
1000 INJECTION, SOLUTION INTRAVENOUS ONCE
Status: CANCELLED
Start: 2022-10-21 | End: 2022-10-21

## 2022-10-20 RX ORDER — METHYLPREDNISOLONE SODIUM SUCCINATE 125 MG/2ML
60 INJECTION, POWDER, LYOPHILIZED, FOR SOLUTION INTRAMUSCULAR; INTRAVENOUS ONCE
Status: CANCELLED
Start: 2022-10-21 | End: 2022-10-21

## 2022-10-20 RX ORDER — SODIUM CHLORIDE, SODIUM LACTATE, POTASSIUM CHLORIDE, CALCIUM CHLORIDE 600; 310; 30; 20 MG/100ML; MG/100ML; MG/100ML; MG/100ML
1000 INJECTION, SOLUTION INTRAVENOUS ONCE
Status: COMPLETED | OUTPATIENT
Start: 2022-10-20 | End: 2022-10-20

## 2022-10-20 RX ADMIN — DIPHENHYDRAMINE HYDROCHLORIDE 50 MG: 50 INJECTION, SOLUTION INTRAMUSCULAR; INTRAVENOUS at 10:04

## 2022-10-20 RX ADMIN — METHYLPREDNISOLONE SODIUM SUCCINATE 60 MG: 125 INJECTION, POWDER, FOR SOLUTION INTRAMUSCULAR; INTRAVENOUS at 12:07

## 2022-10-20 RX ADMIN — SODIUM CHLORIDE, SODIUM LACTATE, POTASSIUM CHLORIDE, AND CALCIUM CHLORIDE 1000 ML: .6; .31; .03; .02 INJECTION, SOLUTION INTRAVENOUS at 09:20

## 2022-10-20 NOTE — PLAN OF CARE
Problem: INFECTION - ADULT  Goal: Absence of fever/infection during neutropenic period  Description: INTERVENTIONS:  - Monitor WBC    10/20/2022 1308 by Kalyan Nichols RN  Outcome: Progressing  10/20/2022 1308 by Kalyan Nichols RN  Outcome: Progressing

## 2022-10-21 ENCOUNTER — HOSPITAL ENCOUNTER (OUTPATIENT)
Dept: INFUSION CENTER | Facility: HOSPITAL | Age: 47
End: 2022-10-21
Attending: INTERNAL MEDICINE
Payer: COMMERCIAL

## 2022-10-21 VITALS
RESPIRATION RATE: 17 BRPM | DIASTOLIC BLOOD PRESSURE: 68 MMHG | SYSTOLIC BLOOD PRESSURE: 116 MMHG | HEART RATE: 96 BPM | OXYGEN SATURATION: 99 % | TEMPERATURE: 97.4 F

## 2022-10-21 DIAGNOSIS — E44.1 MILD PROTEIN-CALORIE MALNUTRITION (HCC): ICD-10-CM

## 2022-10-21 DIAGNOSIS — R19.7 DIARRHEA, UNSPECIFIED TYPE: ICD-10-CM

## 2022-10-21 DIAGNOSIS — R63.0 ANOREXIA: Primary | ICD-10-CM

## 2022-10-21 PROCEDURE — 96361 HYDRATE IV INFUSION ADD-ON: CPT

## 2022-10-21 PROCEDURE — 96372 THER/PROPH/DIAG INJ SC/IM: CPT

## 2022-10-21 PROCEDURE — 96365 THER/PROPH/DIAG IV INF INIT: CPT

## 2022-10-21 RX ORDER — SODIUM CHLORIDE, SODIUM LACTATE, POTASSIUM CHLORIDE, CALCIUM CHLORIDE 600; 310; 30; 20 MG/100ML; MG/100ML; MG/100ML; MG/100ML
1000 INJECTION, SOLUTION INTRAVENOUS ONCE
Status: COMPLETED | OUTPATIENT
Start: 2022-10-21 | End: 2022-10-21

## 2022-10-21 RX ORDER — SODIUM CHLORIDE, SODIUM LACTATE, POTASSIUM CHLORIDE, CALCIUM CHLORIDE 600; 310; 30; 20 MG/100ML; MG/100ML; MG/100ML; MG/100ML
1000 INJECTION, SOLUTION INTRAVENOUS ONCE
Status: CANCELLED
Start: 2022-10-24 | End: 2022-10-22

## 2022-10-21 RX ORDER — METHYLPREDNISOLONE SODIUM SUCCINATE 125 MG/2ML
60 INJECTION, POWDER, LYOPHILIZED, FOR SOLUTION INTRAMUSCULAR; INTRAVENOUS ONCE
Status: CANCELLED
Start: 2022-10-24 | End: 2022-10-22

## 2022-10-21 RX ORDER — METHYLPREDNISOLONE SODIUM SUCCINATE 125 MG/2ML
60 INJECTION, POWDER, LYOPHILIZED, FOR SOLUTION INTRAMUSCULAR; INTRAVENOUS ONCE
Status: COMPLETED | OUTPATIENT
Start: 2022-10-21 | End: 2022-10-21

## 2022-10-21 RX ADMIN — SODIUM CHLORIDE, SODIUM LACTATE, POTASSIUM CHLORIDE, AND CALCIUM CHLORIDE 1000 ML: .6; .31; .03; .02 INJECTION, SOLUTION INTRAVENOUS at 08:25

## 2022-10-21 RX ADMIN — DIPHENHYDRAMINE HYDROCHLORIDE 50 MG: 50 INJECTION, SOLUTION INTRAMUSCULAR; INTRAVENOUS at 08:56

## 2022-10-21 RX ADMIN — METHYLPREDNISOLONE SODIUM SUCCINATE 60 MG: 125 INJECTION, POWDER, FOR SOLUTION INTRAMUSCULAR; INTRAVENOUS at 11:25

## 2022-10-24 ENCOUNTER — HOSPITAL ENCOUNTER (OUTPATIENT)
Dept: INFUSION CENTER | Facility: HOSPITAL | Age: 47
Discharge: HOME | End: 2022-10-24
Payer: COMMERCIAL

## 2022-10-24 VITALS
TEMPERATURE: 97.4 F | HEART RATE: 116 BPM | SYSTOLIC BLOOD PRESSURE: 118 MMHG | RESPIRATION RATE: 18 BRPM | DIASTOLIC BLOOD PRESSURE: 81 MMHG | OXYGEN SATURATION: 95 %

## 2022-10-24 DIAGNOSIS — R63.0 ANOREXIA: Primary | ICD-10-CM

## 2022-10-24 DIAGNOSIS — E44.1 MILD PROTEIN-CALORIE MALNUTRITION (HCC): ICD-10-CM

## 2022-10-24 DIAGNOSIS — R19.7 DIARRHEA, UNSPECIFIED TYPE: ICD-10-CM

## 2022-10-24 RX ORDER — METHYLPREDNISOLONE SODIUM SUCCINATE 125 MG/2ML
60 INJECTION, POWDER, LYOPHILIZED, FOR SOLUTION INTRAMUSCULAR; INTRAVENOUS ONCE
Status: CANCELLED
Start: 2022-10-26 | End: 2022-10-25

## 2022-10-24 RX ORDER — SODIUM CHLORIDE, SODIUM LACTATE, POTASSIUM CHLORIDE, CALCIUM CHLORIDE 600; 310; 30; 20 MG/100ML; MG/100ML; MG/100ML; MG/100ML
1000 INJECTION, SOLUTION INTRAVENOUS ONCE
Status: CANCELLED
Start: 2022-10-26 | End: 2022-10-25

## 2022-10-24 RX ORDER — METHYLPREDNISOLONE SODIUM SUCCINATE 125 MG/2ML
60 INJECTION, POWDER, LYOPHILIZED, FOR SOLUTION INTRAMUSCULAR; INTRAVENOUS ONCE
Status: COMPLETED | OUTPATIENT
Start: 2022-10-24 | End: 2022-10-24

## 2022-10-24 RX ORDER — SODIUM CHLORIDE, SODIUM LACTATE, POTASSIUM CHLORIDE, CALCIUM CHLORIDE 600; 310; 30; 20 MG/100ML; MG/100ML; MG/100ML; MG/100ML
1000 INJECTION, SOLUTION INTRAVENOUS ONCE
Status: COMPLETED | OUTPATIENT
Start: 2022-10-24 | End: 2022-10-24

## 2022-10-24 RX ADMIN — SODIUM CHLORIDE, SODIUM LACTATE, POTASSIUM CHLORIDE, AND CALCIUM CHLORIDE 1000 ML: .6; .31; .03; .02 INJECTION, SOLUTION INTRAVENOUS at 12:38

## 2022-10-24 RX ADMIN — METHYLPREDNISOLONE SODIUM SUCCINATE 60 MG: 125 INJECTION, POWDER, FOR SOLUTION INTRAMUSCULAR; INTRAVENOUS at 15:10

## 2022-10-24 RX ADMIN — DIPHENHYDRAMINE HYDROCHLORIDE 50 MG: 50 INJECTION, SOLUTION INTRAMUSCULAR; INTRAVENOUS at 12:46

## 2022-10-26 ENCOUNTER — HOSPITAL ENCOUNTER (OUTPATIENT)
Dept: INFUSION CENTER | Facility: HOSPITAL | Age: 47
Discharge: HOME | End: 2022-10-26
Payer: COMMERCIAL

## 2022-10-26 VITALS
RESPIRATION RATE: 18 BRPM | HEART RATE: 97 BPM | TEMPERATURE: 98.8 F | DIASTOLIC BLOOD PRESSURE: 72 MMHG | OXYGEN SATURATION: 97 % | SYSTOLIC BLOOD PRESSURE: 123 MMHG

## 2022-10-26 DIAGNOSIS — R19.7 DIARRHEA, UNSPECIFIED TYPE: ICD-10-CM

## 2022-10-26 DIAGNOSIS — R63.0 ANOREXIA: Primary | ICD-10-CM

## 2022-10-26 DIAGNOSIS — E44.1 MILD PROTEIN-CALORIE MALNUTRITION (HCC): ICD-10-CM

## 2022-10-26 RX ORDER — SODIUM CHLORIDE, SODIUM LACTATE, POTASSIUM CHLORIDE, CALCIUM CHLORIDE 600; 310; 30; 20 MG/100ML; MG/100ML; MG/100ML; MG/100ML
1000 INJECTION, SOLUTION INTRAVENOUS ONCE
Status: CANCELLED
Start: 2022-10-27 | End: 2022-10-27

## 2022-10-26 RX ORDER — METHYLPREDNISOLONE SODIUM SUCCINATE 125 MG/2ML
60 INJECTION, POWDER, LYOPHILIZED, FOR SOLUTION INTRAMUSCULAR; INTRAVENOUS ONCE
Status: COMPLETED | OUTPATIENT
Start: 2022-10-26 | End: 2022-10-26

## 2022-10-26 RX ORDER — METHYLPREDNISOLONE SODIUM SUCCINATE 125 MG/2ML
60 INJECTION, POWDER, LYOPHILIZED, FOR SOLUTION INTRAMUSCULAR; INTRAVENOUS ONCE
Status: CANCELLED
Start: 2022-10-27 | End: 2022-10-27

## 2022-10-26 RX ORDER — SODIUM CHLORIDE, SODIUM LACTATE, POTASSIUM CHLORIDE, CALCIUM CHLORIDE 600; 310; 30; 20 MG/100ML; MG/100ML; MG/100ML; MG/100ML
1000 INJECTION, SOLUTION INTRAVENOUS ONCE
Status: COMPLETED | OUTPATIENT
Start: 2022-10-26 | End: 2022-10-26

## 2022-10-26 RX ADMIN — DIPHENHYDRAMINE HYDROCHLORIDE 50 MG: 50 INJECTION, SOLUTION INTRAMUSCULAR; INTRAVENOUS at 12:29

## 2022-10-26 RX ADMIN — METHYLPREDNISOLONE SODIUM SUCCINATE 60 MG: 125 INJECTION, POWDER, FOR SOLUTION INTRAMUSCULAR; INTRAVENOUS at 15:19

## 2022-10-26 RX ADMIN — SODIUM CHLORIDE, SODIUM LACTATE, POTASSIUM CHLORIDE, AND CALCIUM CHLORIDE 1000 ML: .6; .31; .03; .02 INJECTION, SOLUTION INTRAVENOUS at 12:27

## 2022-10-27 ENCOUNTER — HOSPITAL ENCOUNTER (OUTPATIENT)
Dept: INFUSION CENTER | Facility: HOSPITAL | Age: 47
Discharge: HOME | End: 2022-10-27
Attending: INTERNAL MEDICINE

## 2022-10-27 VITALS
OXYGEN SATURATION: 98 % | TEMPERATURE: 98.2 F | HEART RATE: 76 BPM | RESPIRATION RATE: 18 BRPM | DIASTOLIC BLOOD PRESSURE: 93 MMHG | SYSTOLIC BLOOD PRESSURE: 130 MMHG

## 2022-10-27 DIAGNOSIS — R19.7 DIARRHEA, UNSPECIFIED TYPE: ICD-10-CM

## 2022-10-27 DIAGNOSIS — R63.0 ANOREXIA: Primary | ICD-10-CM

## 2022-10-27 DIAGNOSIS — E44.1 MILD PROTEIN-CALORIE MALNUTRITION (HCC): ICD-10-CM

## 2022-10-27 RX ORDER — METHYLPREDNISOLONE SODIUM SUCCINATE 125 MG/2ML
60 INJECTION, POWDER, LYOPHILIZED, FOR SOLUTION INTRAMUSCULAR; INTRAVENOUS ONCE
Status: COMPLETED | OUTPATIENT
Start: 2022-10-27 | End: 2022-10-27

## 2022-10-27 RX ORDER — SODIUM CHLORIDE, SODIUM LACTATE, POTASSIUM CHLORIDE, CALCIUM CHLORIDE 600; 310; 30; 20 MG/100ML; MG/100ML; MG/100ML; MG/100ML
1000 INJECTION, SOLUTION INTRAVENOUS ONCE
Status: COMPLETED | OUTPATIENT
Start: 2022-10-27 | End: 2022-10-27

## 2022-10-27 RX ORDER — SODIUM CHLORIDE, SODIUM LACTATE, POTASSIUM CHLORIDE, CALCIUM CHLORIDE 600; 310; 30; 20 MG/100ML; MG/100ML; MG/100ML; MG/100ML
1000 INJECTION, SOLUTION INTRAVENOUS ONCE
Status: CANCELLED
Start: 2022-10-28 | End: 2022-10-28

## 2022-10-27 RX ORDER — METHYLPREDNISOLONE SODIUM SUCCINATE 125 MG/2ML
60 INJECTION, POWDER, LYOPHILIZED, FOR SOLUTION INTRAMUSCULAR; INTRAVENOUS ONCE
Status: CANCELLED
Start: 2022-10-28 | End: 2022-10-28

## 2022-10-27 RX ADMIN — SODIUM CHLORIDE, SODIUM LACTATE, POTASSIUM CHLORIDE, AND CALCIUM CHLORIDE 1000 ML: .6; .31; .03; .02 INJECTION, SOLUTION INTRAVENOUS at 12:31

## 2022-10-27 RX ADMIN — METHYLPREDNISOLONE SODIUM SUCCINATE 60 MG: 125 INJECTION, POWDER, FOR SOLUTION INTRAMUSCULAR; INTRAVENOUS at 15:03

## 2022-10-27 RX ADMIN — DIPHENHYDRAMINE HYDROCHLORIDE 50 MG: 50 INJECTION, SOLUTION INTRAMUSCULAR; INTRAVENOUS at 12:48

## 2022-10-28 ENCOUNTER — HOSPITAL ENCOUNTER (OUTPATIENT)
Dept: INFUSION CENTER | Facility: HOSPITAL | Age: 47
End: 2022-10-28
Attending: INTERNAL MEDICINE
Payer: COMMERCIAL

## 2022-10-28 VITALS
RESPIRATION RATE: 18 BRPM | DIASTOLIC BLOOD PRESSURE: 90 MMHG | OXYGEN SATURATION: 98 % | HEART RATE: 92 BPM | SYSTOLIC BLOOD PRESSURE: 128 MMHG | TEMPERATURE: 97.8 F

## 2022-10-28 DIAGNOSIS — R19.7 DIARRHEA, UNSPECIFIED TYPE: ICD-10-CM

## 2022-10-28 DIAGNOSIS — R63.0 ANOREXIA: Primary | ICD-10-CM

## 2022-10-28 DIAGNOSIS — E44.1 MILD PROTEIN-CALORIE MALNUTRITION (HCC): ICD-10-CM

## 2022-10-28 RX ORDER — SODIUM CHLORIDE, SODIUM LACTATE, POTASSIUM CHLORIDE, CALCIUM CHLORIDE 600; 310; 30; 20 MG/100ML; MG/100ML; MG/100ML; MG/100ML
1000 INJECTION, SOLUTION INTRAVENOUS ONCE
Status: CANCELLED
Start: 2022-10-29 | End: 2022-10-29

## 2022-10-28 RX ORDER — METHYLPREDNISOLONE SODIUM SUCCINATE 125 MG/2ML
60 INJECTION, POWDER, LYOPHILIZED, FOR SOLUTION INTRAMUSCULAR; INTRAVENOUS ONCE
Status: COMPLETED | OUTPATIENT
Start: 2022-10-28 | End: 2022-10-28

## 2022-10-28 RX ORDER — SODIUM CHLORIDE, SODIUM LACTATE, POTASSIUM CHLORIDE, CALCIUM CHLORIDE 600; 310; 30; 20 MG/100ML; MG/100ML; MG/100ML; MG/100ML
1000 INJECTION, SOLUTION INTRAVENOUS ONCE
Status: COMPLETED | OUTPATIENT
Start: 2022-10-28 | End: 2022-10-28

## 2022-10-28 RX ORDER — METHYLPREDNISOLONE SODIUM SUCCINATE 125 MG/2ML
60 INJECTION, POWDER, LYOPHILIZED, FOR SOLUTION INTRAMUSCULAR; INTRAVENOUS ONCE
Status: CANCELLED
Start: 2022-10-29 | End: 2022-10-29

## 2022-10-28 RX ADMIN — DIPHENHYDRAMINE HYDROCHLORIDE 50 MG: 50 INJECTION, SOLUTION INTRAMUSCULAR; INTRAVENOUS at 09:24

## 2022-10-28 RX ADMIN — SODIUM CHLORIDE, SODIUM LACTATE, POTASSIUM CHLORIDE, AND CALCIUM CHLORIDE 1000 ML: .6; .31; .03; .02 INJECTION, SOLUTION INTRAVENOUS at 09:08

## 2022-10-28 RX ADMIN — METHYLPREDNISOLONE SODIUM SUCCINATE 60 MG: 125 INJECTION, POWDER, FOR SOLUTION INTRAMUSCULAR; INTRAVENOUS at 11:46

## 2022-11-01 ENCOUNTER — HOSPITAL ENCOUNTER (OUTPATIENT)
Dept: INFUSION CENTER | Facility: HOSPITAL | Age: 47
Discharge: HOME | End: 2022-11-01

## 2022-11-01 VITALS
TEMPERATURE: 97.9 F | SYSTOLIC BLOOD PRESSURE: 146 MMHG | RESPIRATION RATE: 18 BRPM | DIASTOLIC BLOOD PRESSURE: 80 MMHG | HEART RATE: 108 BPM | OXYGEN SATURATION: 96 %

## 2022-11-01 DIAGNOSIS — R19.7 DIARRHEA, UNSPECIFIED TYPE: ICD-10-CM

## 2022-11-01 DIAGNOSIS — L50.1 CHRONIC IDIOPATHIC URTICARIA: Primary | ICD-10-CM

## 2022-11-01 DIAGNOSIS — E44.1 MILD PROTEIN-CALORIE MALNUTRITION (HCC): ICD-10-CM

## 2022-11-01 DIAGNOSIS — R63.0 ANOREXIA: Primary | ICD-10-CM

## 2022-11-01 RX ORDER — SODIUM CHLORIDE, SODIUM LACTATE, POTASSIUM CHLORIDE, CALCIUM CHLORIDE 600; 310; 30; 20 MG/100ML; MG/100ML; MG/100ML; MG/100ML
1000 INJECTION, SOLUTION INTRAVENOUS ONCE
Status: COMPLETED | OUTPATIENT
Start: 2022-11-01 | End: 2022-11-01

## 2022-11-01 RX ORDER — SODIUM CHLORIDE, SODIUM LACTATE, POTASSIUM CHLORIDE, CALCIUM CHLORIDE 600; 310; 30; 20 MG/100ML; MG/100ML; MG/100ML; MG/100ML
1000 INJECTION, SOLUTION INTRAVENOUS ONCE
Status: CANCELLED
Start: 2022-11-01 | End: 2022-11-02

## 2022-11-01 RX ORDER — METHYLPREDNISOLONE SODIUM SUCCINATE 125 MG/2ML
60 INJECTION, POWDER, LYOPHILIZED, FOR SOLUTION INTRAMUSCULAR; INTRAVENOUS ONCE
Status: COMPLETED | OUTPATIENT
Start: 2022-11-01 | End: 2022-11-01

## 2022-11-01 RX ORDER — METHYLPREDNISOLONE SODIUM SUCCINATE 125 MG/2ML
60 INJECTION, POWDER, LYOPHILIZED, FOR SOLUTION INTRAMUSCULAR; INTRAVENOUS ONCE
Status: CANCELLED
Start: 2022-11-01 | End: 2022-11-02

## 2022-11-01 RX ORDER — EPINEPHRINE 1 MG/ML
0.3 INJECTION, SOLUTION, CONCENTRATE INTRAVENOUS
Status: CANCELLED | OUTPATIENT
Start: 2022-11-09

## 2022-11-01 RX ADMIN — METHYLPREDNISOLONE SODIUM SUCCINATE 60 MG: 125 INJECTION, POWDER, FOR SOLUTION INTRAMUSCULAR; INTRAVENOUS at 14:25

## 2022-11-01 RX ADMIN — SODIUM CHLORIDE, SODIUM LACTATE, POTASSIUM CHLORIDE, AND CALCIUM CHLORIDE 1000 ML: .6; .31; .03; .02 INJECTION, SOLUTION INTRAVENOUS at 11:41

## 2022-11-01 RX ADMIN — DIPHENHYDRAMINE HYDROCHLORIDE 50 MG: 50 INJECTION, SOLUTION INTRAMUSCULAR; INTRAVENOUS at 11:56

## 2022-11-03 ENCOUNTER — HOSPITAL ENCOUNTER (OUTPATIENT)
Dept: INFUSION CENTER | Facility: HOSPITAL | Age: 47
Discharge: HOME | End: 2022-11-03

## 2022-11-03 VITALS
SYSTOLIC BLOOD PRESSURE: 133 MMHG | OXYGEN SATURATION: 95 % | HEART RATE: 113 BPM | DIASTOLIC BLOOD PRESSURE: 81 MMHG | TEMPERATURE: 99.1 F | RESPIRATION RATE: 18 BRPM

## 2022-11-03 DIAGNOSIS — E44.1 MILD PROTEIN-CALORIE MALNUTRITION (HCC): ICD-10-CM

## 2022-11-03 DIAGNOSIS — R63.0 ANOREXIA: Primary | ICD-10-CM

## 2022-11-03 DIAGNOSIS — R19.7 DIARRHEA, UNSPECIFIED TYPE: ICD-10-CM

## 2022-11-03 RX ORDER — METHYLPREDNISOLONE SODIUM SUCCINATE 125 MG/2ML
60 INJECTION, POWDER, LYOPHILIZED, FOR SOLUTION INTRAMUSCULAR; INTRAVENOUS ONCE
Status: COMPLETED | OUTPATIENT
Start: 2022-11-03 | End: 2022-11-03

## 2022-11-03 RX ORDER — SODIUM CHLORIDE, SODIUM LACTATE, POTASSIUM CHLORIDE, CALCIUM CHLORIDE 600; 310; 30; 20 MG/100ML; MG/100ML; MG/100ML; MG/100ML
1000 INJECTION, SOLUTION INTRAVENOUS ONCE
Status: COMPLETED | OUTPATIENT
Start: 2022-11-03 | End: 2022-11-03

## 2022-11-03 RX ORDER — METHYLPREDNISOLONE SODIUM SUCCINATE 125 MG/2ML
60 INJECTION, POWDER, LYOPHILIZED, FOR SOLUTION INTRAMUSCULAR; INTRAVENOUS ONCE
Status: CANCELLED
Start: 2022-11-04 | End: 2022-11-04

## 2022-11-03 RX ORDER — SODIUM CHLORIDE, SODIUM LACTATE, POTASSIUM CHLORIDE, CALCIUM CHLORIDE 600; 310; 30; 20 MG/100ML; MG/100ML; MG/100ML; MG/100ML
1000 INJECTION, SOLUTION INTRAVENOUS ONCE
Status: CANCELLED
Start: 2022-11-04 | End: 2022-11-04

## 2022-11-03 RX ADMIN — METHYLPREDNISOLONE SODIUM SUCCINATE 60 MG: 125 INJECTION, POWDER, FOR SOLUTION INTRAMUSCULAR; INTRAVENOUS at 15:05

## 2022-11-03 RX ADMIN — SODIUM CHLORIDE, SODIUM LACTATE, POTASSIUM CHLORIDE, AND CALCIUM CHLORIDE 1000 ML: .6; .31; .03; .02 INJECTION, SOLUTION INTRAVENOUS at 12:15

## 2022-11-03 RX ADMIN — DIPHENHYDRAMINE HYDROCHLORIDE 50 MG: 50 INJECTION, SOLUTION INTRAMUSCULAR; INTRAVENOUS at 13:29

## 2022-11-04 ENCOUNTER — HOSPITAL ENCOUNTER (OUTPATIENT)
Dept: INFUSION CENTER | Facility: HOSPITAL | Age: 47
End: 2022-11-04

## 2022-11-04 VITALS
HEART RATE: 92 BPM | SYSTOLIC BLOOD PRESSURE: 130 MMHG | OXYGEN SATURATION: 97 % | RESPIRATION RATE: 17 BRPM | TEMPERATURE: 98.1 F | DIASTOLIC BLOOD PRESSURE: 87 MMHG

## 2022-11-04 DIAGNOSIS — R63.0 ANOREXIA: Primary | ICD-10-CM

## 2022-11-04 DIAGNOSIS — E44.1 MILD PROTEIN-CALORIE MALNUTRITION (HCC): ICD-10-CM

## 2022-11-04 DIAGNOSIS — R19.7 DIARRHEA, UNSPECIFIED TYPE: ICD-10-CM

## 2022-11-04 RX ORDER — METHYLPREDNISOLONE SODIUM SUCCINATE 125 MG/2ML
60 INJECTION, POWDER, LYOPHILIZED, FOR SOLUTION INTRAMUSCULAR; INTRAVENOUS ONCE
Status: CANCELLED
Start: 2022-11-05 | End: 2022-11-05

## 2022-11-04 RX ORDER — METHYLPREDNISOLONE SODIUM SUCCINATE 125 MG/2ML
60 INJECTION, POWDER, LYOPHILIZED, FOR SOLUTION INTRAMUSCULAR; INTRAVENOUS ONCE
Status: COMPLETED | OUTPATIENT
Start: 2022-11-04 | End: 2022-11-04

## 2022-11-04 RX ORDER — SODIUM CHLORIDE, SODIUM LACTATE, POTASSIUM CHLORIDE, CALCIUM CHLORIDE 600; 310; 30; 20 MG/100ML; MG/100ML; MG/100ML; MG/100ML
1000 INJECTION, SOLUTION INTRAVENOUS ONCE
Status: CANCELLED
Start: 2022-11-05 | End: 2022-11-05

## 2022-11-04 RX ORDER — SODIUM CHLORIDE, SODIUM LACTATE, POTASSIUM CHLORIDE, CALCIUM CHLORIDE 600; 310; 30; 20 MG/100ML; MG/100ML; MG/100ML; MG/100ML
1000 INJECTION, SOLUTION INTRAVENOUS ONCE
Status: COMPLETED | OUTPATIENT
Start: 2022-11-04 | End: 2022-11-04

## 2022-11-04 RX ADMIN — METHYLPREDNISOLONE SODIUM SUCCINATE 60 MG: 125 INJECTION, POWDER, FOR SOLUTION INTRAMUSCULAR; INTRAVENOUS at 11:33

## 2022-11-04 RX ADMIN — SODIUM CHLORIDE, SODIUM LACTATE, POTASSIUM CHLORIDE, AND CALCIUM CHLORIDE 1000 ML: .6; .31; .03; .02 INJECTION, SOLUTION INTRAVENOUS at 09:00

## 2022-11-04 RX ADMIN — DIPHENHYDRAMINE HYDROCHLORIDE 50 MG: 50 INJECTION, SOLUTION INTRAMUSCULAR; INTRAVENOUS at 10:00

## 2022-11-07 ENCOUNTER — HOSPITAL ENCOUNTER (OUTPATIENT)
Dept: INFUSION CENTER | Facility: HOSPITAL | Age: 47
Discharge: HOME | End: 2022-11-07

## 2022-11-07 VITALS
TEMPERATURE: 97.7 F | SYSTOLIC BLOOD PRESSURE: 116 MMHG | DIASTOLIC BLOOD PRESSURE: 83 MMHG | HEART RATE: 105 BPM | OXYGEN SATURATION: 96 % | RESPIRATION RATE: 18 BRPM

## 2022-11-07 DIAGNOSIS — R63.0 ANOREXIA: Primary | ICD-10-CM

## 2022-11-07 DIAGNOSIS — E44.1 MILD PROTEIN-CALORIE MALNUTRITION (HCC): ICD-10-CM

## 2022-11-07 DIAGNOSIS — R19.7 DIARRHEA, UNSPECIFIED TYPE: ICD-10-CM

## 2022-11-07 RX ORDER — SODIUM CHLORIDE, SODIUM LACTATE, POTASSIUM CHLORIDE, CALCIUM CHLORIDE 600; 310; 30; 20 MG/100ML; MG/100ML; MG/100ML; MG/100ML
1000 INJECTION, SOLUTION INTRAVENOUS ONCE
Status: COMPLETED | OUTPATIENT
Start: 2022-11-07 | End: 2022-11-07

## 2022-11-07 RX ORDER — METHYLPREDNISOLONE SODIUM SUCCINATE 125 MG/2ML
60 INJECTION, POWDER, LYOPHILIZED, FOR SOLUTION INTRAMUSCULAR; INTRAVENOUS ONCE
Status: COMPLETED | OUTPATIENT
Start: 2022-11-07 | End: 2022-11-07

## 2022-11-07 RX ORDER — SODIUM CHLORIDE, SODIUM LACTATE, POTASSIUM CHLORIDE, CALCIUM CHLORIDE 600; 310; 30; 20 MG/100ML; MG/100ML; MG/100ML; MG/100ML
1000 INJECTION, SOLUTION INTRAVENOUS ONCE
Status: CANCELLED
Start: 2022-11-08 | End: 2022-11-08

## 2022-11-07 RX ORDER — METHYLPREDNISOLONE SODIUM SUCCINATE 125 MG/2ML
60 INJECTION, POWDER, LYOPHILIZED, FOR SOLUTION INTRAMUSCULAR; INTRAVENOUS ONCE
Status: CANCELLED
Start: 2022-11-08 | End: 2022-11-08

## 2022-11-07 RX ADMIN — METHYLPREDNISOLONE SODIUM SUCCINATE 60 MG: 125 INJECTION, POWDER, FOR SOLUTION INTRAMUSCULAR; INTRAVENOUS at 11:56

## 2022-11-07 RX ADMIN — SODIUM CHLORIDE, SODIUM LACTATE, POTASSIUM CHLORIDE, AND CALCIUM CHLORIDE 1000 ML: .6; .31; .03; .02 INJECTION, SOLUTION INTRAVENOUS at 09:23

## 2022-11-07 RX ADMIN — DIPHENHYDRAMINE HYDROCHLORIDE 50 MG: 50 INJECTION, SOLUTION INTRAMUSCULAR; INTRAVENOUS at 09:49

## 2022-11-07 NOTE — PLAN OF CARE
Problem: INFECTION - ADULT  Goal: Absence of fever/infection during neutropenic period  Description: INTERVENTIONS:  - Monitor WBC    Outcome: Progressing     Problem: INFECTION - ADULT  Goal: Absence of fever/infection during neutropenic period  Description: INTERVENTIONS:  - Monitor WBC    Outcome: Progressing

## 2022-11-08 ENCOUNTER — HOSPITAL ENCOUNTER (OUTPATIENT)
Dept: INFUSION CENTER | Facility: HOSPITAL | Age: 47
Discharge: HOME | End: 2022-11-08

## 2022-11-08 VITALS
RESPIRATION RATE: 18 BRPM | HEART RATE: 92 BPM | TEMPERATURE: 97.9 F | OXYGEN SATURATION: 98 % | DIASTOLIC BLOOD PRESSURE: 76 MMHG | SYSTOLIC BLOOD PRESSURE: 129 MMHG

## 2022-11-08 DIAGNOSIS — R19.7 DIARRHEA, UNSPECIFIED TYPE: ICD-10-CM

## 2022-11-08 DIAGNOSIS — E44.1 MILD PROTEIN-CALORIE MALNUTRITION (HCC): ICD-10-CM

## 2022-11-08 DIAGNOSIS — R63.0 ANOREXIA: Primary | ICD-10-CM

## 2022-11-08 RX ORDER — METHYLPREDNISOLONE SODIUM SUCCINATE 125 MG/2ML
60 INJECTION, POWDER, LYOPHILIZED, FOR SOLUTION INTRAMUSCULAR; INTRAVENOUS ONCE
Status: COMPLETED | OUTPATIENT
Start: 2022-11-08 | End: 2022-11-08

## 2022-11-08 RX ORDER — SODIUM CHLORIDE, SODIUM LACTATE, POTASSIUM CHLORIDE, CALCIUM CHLORIDE 600; 310; 30; 20 MG/100ML; MG/100ML; MG/100ML; MG/100ML
1000 INJECTION, SOLUTION INTRAVENOUS ONCE
Status: CANCELLED
Start: 2022-11-09 | End: 2022-11-09

## 2022-11-08 RX ORDER — SODIUM CHLORIDE, SODIUM LACTATE, POTASSIUM CHLORIDE, CALCIUM CHLORIDE 600; 310; 30; 20 MG/100ML; MG/100ML; MG/100ML; MG/100ML
1000 INJECTION, SOLUTION INTRAVENOUS ONCE
Status: COMPLETED | OUTPATIENT
Start: 2022-11-08 | End: 2022-11-08

## 2022-11-08 RX ORDER — METHYLPREDNISOLONE SODIUM SUCCINATE 125 MG/2ML
60 INJECTION, POWDER, LYOPHILIZED, FOR SOLUTION INTRAMUSCULAR; INTRAVENOUS ONCE
Status: CANCELLED
Start: 2022-11-09 | End: 2022-11-09

## 2022-11-08 RX ADMIN — SODIUM CHLORIDE, SODIUM LACTATE, POTASSIUM CHLORIDE, AND CALCIUM CHLORIDE 1000 ML: .6; .31; .03; .02 INJECTION, SOLUTION INTRAVENOUS at 11:47

## 2022-11-08 RX ADMIN — DIPHENHYDRAMINE HYDROCHLORIDE 50 MG: 50 INJECTION, SOLUTION INTRAMUSCULAR; INTRAVENOUS at 12:32

## 2022-11-08 RX ADMIN — METHYLPREDNISOLONE SODIUM SUCCINATE 60 MG: 125 INJECTION, POWDER, FOR SOLUTION INTRAMUSCULAR; INTRAVENOUS at 14:21

## 2022-11-09 ENCOUNTER — HOSPITAL ENCOUNTER (OUTPATIENT)
Dept: INFUSION CENTER | Facility: HOSPITAL | Age: 47
Discharge: HOME | End: 2022-11-09

## 2022-11-09 ENCOUNTER — APPOINTMENT (OUTPATIENT)
Dept: LAB | Facility: HOSPITAL | Age: 47
End: 2022-11-09

## 2022-11-09 VITALS
RESPIRATION RATE: 17 BRPM | OXYGEN SATURATION: 97 % | TEMPERATURE: 97.4 F | HEART RATE: 98 BPM | DIASTOLIC BLOOD PRESSURE: 75 MMHG | SYSTOLIC BLOOD PRESSURE: 124 MMHG

## 2022-11-09 DIAGNOSIS — R19.7 DIARRHEA, UNSPECIFIED TYPE: ICD-10-CM

## 2022-11-09 DIAGNOSIS — R63.0 ANOREXIA: Primary | ICD-10-CM

## 2022-11-09 DIAGNOSIS — L50.1 CHRONIC IDIOPATHIC URTICARIA: ICD-10-CM

## 2022-11-09 DIAGNOSIS — E44.1 MILD PROTEIN-CALORIE MALNUTRITION (HCC): ICD-10-CM

## 2022-11-09 DIAGNOSIS — D89.49 OTHER MAST CELL ACTIVATION DISORDER (HCC): Primary | ICD-10-CM

## 2022-11-09 LAB
BASOPHILS # BLD AUTO: 0.04 THOUSANDS/ÂΜL (ref 0–0.1)
BASOPHILS NFR BLD AUTO: 0 % (ref 0–1)
EOSINOPHIL # BLD AUTO: 0.02 THOUSAND/ÂΜL (ref 0–0.61)
EOSINOPHIL NFR BLD AUTO: 0 % (ref 0–6)
ERYTHROCYTE [DISTWIDTH] IN BLOOD BY AUTOMATED COUNT: 14.8 % (ref 11.6–15.1)
HCT VFR BLD AUTO: 43.2 % (ref 34.8–46.1)
HGB BLD-MCNC: 14.1 G/DL (ref 11.5–15.4)
IMM GRANULOCYTES # BLD AUTO: 0.05 THOUSAND/UL (ref 0–0.2)
IMM GRANULOCYTES NFR BLD AUTO: 0 % (ref 0–2)
LYMPHOCYTES # BLD AUTO: 3.37 THOUSANDS/ÂΜL (ref 0.6–4.47)
LYMPHOCYTES NFR BLD AUTO: 23 % (ref 14–44)
MCH RBC QN AUTO: 29.8 PG (ref 26.8–34.3)
MCHC RBC AUTO-ENTMCNC: 32.6 G/DL (ref 31.4–37.4)
MCV RBC AUTO: 91 FL (ref 82–98)
MONOCYTES # BLD AUTO: 0.73 THOUSAND/ÂΜL (ref 0.17–1.22)
MONOCYTES NFR BLD AUTO: 5 % (ref 4–12)
NEUTROPHILS # BLD AUTO: 10.29 THOUSANDS/ÂΜL (ref 1.85–7.62)
NEUTS SEG NFR BLD AUTO: 72 % (ref 43–75)
NRBC BLD AUTO-RTO: 0 /100 WBCS
PLATELET # BLD AUTO: 378 THOUSANDS/UL (ref 149–390)
PMV BLD AUTO: 9.7 FL (ref 8.9–12.7)
RBC # BLD AUTO: 4.73 MILLION/UL (ref 3.81–5.12)
WBC # BLD AUTO: 14.5 THOUSAND/UL (ref 4.31–10.16)

## 2022-11-09 RX ORDER — METHYLPREDNISOLONE SODIUM SUCCINATE 125 MG/2ML
60 INJECTION, POWDER, LYOPHILIZED, FOR SOLUTION INTRAMUSCULAR; INTRAVENOUS ONCE
Status: COMPLETED | OUTPATIENT
Start: 2022-11-09 | End: 2022-11-09

## 2022-11-09 RX ORDER — SODIUM CHLORIDE, SODIUM LACTATE, POTASSIUM CHLORIDE, CALCIUM CHLORIDE 600; 310; 30; 20 MG/100ML; MG/100ML; MG/100ML; MG/100ML
1000 INJECTION, SOLUTION INTRAVENOUS ONCE
Status: CANCELLED
Start: 2022-11-10 | End: 2022-11-10

## 2022-11-09 RX ORDER — METHYLPREDNISOLONE SODIUM SUCCINATE 125 MG/2ML
60 INJECTION, POWDER, LYOPHILIZED, FOR SOLUTION INTRAMUSCULAR; INTRAVENOUS ONCE
Status: CANCELLED
Start: 2022-11-10 | End: 2022-11-10

## 2022-11-09 RX ORDER — EPINEPHRINE 1 MG/ML
0.3 INJECTION, SOLUTION, CONCENTRATE INTRAVENOUS
Status: CANCELLED | OUTPATIENT
Start: 2022-11-30

## 2022-11-09 RX ORDER — SODIUM CHLORIDE, SODIUM LACTATE, POTASSIUM CHLORIDE, CALCIUM CHLORIDE 600; 310; 30; 20 MG/100ML; MG/100ML; MG/100ML; MG/100ML
1000 INJECTION, SOLUTION INTRAVENOUS ONCE
Status: COMPLETED | OUTPATIENT
Start: 2022-11-09 | End: 2022-11-09

## 2022-11-09 RX ORDER — EPINEPHRINE 1 MG/ML
0.3 INJECTION, SOLUTION, CONCENTRATE INTRAVENOUS
Status: DISCONTINUED | OUTPATIENT
Start: 2022-11-09 | End: 2022-11-12 | Stop reason: HOSPADM

## 2022-11-09 RX ADMIN — DIPHENHYDRAMINE HYDROCHLORIDE 50 MG: 50 INJECTION, SOLUTION INTRAMUSCULAR; INTRAVENOUS at 13:02

## 2022-11-09 RX ADMIN — METHYLPREDNISOLONE SODIUM SUCCINATE 60 MG: 125 INJECTION, POWDER, FOR SOLUTION INTRAMUSCULAR; INTRAVENOUS at 15:12

## 2022-11-09 RX ADMIN — SODIUM CHLORIDE, SODIUM LACTATE, POTASSIUM CHLORIDE, AND CALCIUM CHLORIDE 1000 ML: .6; .31; .03; .02 INJECTION, SOLUTION INTRAVENOUS at 12:28

## 2022-11-09 RX ADMIN — OMALIZUMAB 300 MG: 150 INJECTION, SOLUTION SUBCUTANEOUS at 15:04

## 2022-11-10 ENCOUNTER — HOSPITAL ENCOUNTER (OUTPATIENT)
Dept: INFUSION CENTER | Facility: CLINIC | Age: 47
Discharge: HOME | End: 2022-11-10

## 2022-11-11 ENCOUNTER — APPOINTMENT (OUTPATIENT)
Dept: LAB | Facility: HOSPITAL | Age: 47
End: 2022-11-11

## 2022-11-11 ENCOUNTER — HOSPITAL ENCOUNTER (OUTPATIENT)
Dept: INFUSION CENTER | Facility: HOSPITAL | Age: 47
End: 2022-11-11

## 2022-11-11 VITALS
TEMPERATURE: 98 F | DIASTOLIC BLOOD PRESSURE: 74 MMHG | OXYGEN SATURATION: 97 % | RESPIRATION RATE: 18 BRPM | SYSTOLIC BLOOD PRESSURE: 121 MMHG | HEART RATE: 98 BPM

## 2022-11-11 DIAGNOSIS — E44.1 MILD PROTEIN-CALORIE MALNUTRITION (HCC): ICD-10-CM

## 2022-11-11 DIAGNOSIS — R63.0 ANOREXIA: Primary | ICD-10-CM

## 2022-11-11 DIAGNOSIS — R19.7 DIARRHEA, UNSPECIFIED TYPE: ICD-10-CM

## 2022-11-11 RX ORDER — METHYLPREDNISOLONE SODIUM SUCCINATE 125 MG/2ML
60 INJECTION, POWDER, LYOPHILIZED, FOR SOLUTION INTRAMUSCULAR; INTRAVENOUS ONCE
Status: CANCELLED
Start: 2022-11-12 | End: 2022-11-12

## 2022-11-11 RX ORDER — SODIUM CHLORIDE, SODIUM LACTATE, POTASSIUM CHLORIDE, CALCIUM CHLORIDE 600; 310; 30; 20 MG/100ML; MG/100ML; MG/100ML; MG/100ML
1000 INJECTION, SOLUTION INTRAVENOUS ONCE
Status: CANCELLED
Start: 2022-11-12 | End: 2022-11-12

## 2022-11-11 RX ORDER — SODIUM CHLORIDE, SODIUM LACTATE, POTASSIUM CHLORIDE, CALCIUM CHLORIDE 600; 310; 30; 20 MG/100ML; MG/100ML; MG/100ML; MG/100ML
1000 INJECTION, SOLUTION INTRAVENOUS ONCE
Status: COMPLETED | OUTPATIENT
Start: 2022-11-11 | End: 2022-11-11

## 2022-11-11 RX ORDER — METHYLPREDNISOLONE SODIUM SUCCINATE 125 MG/2ML
60 INJECTION, POWDER, LYOPHILIZED, FOR SOLUTION INTRAMUSCULAR; INTRAVENOUS ONCE
Status: COMPLETED | OUTPATIENT
Start: 2022-11-11 | End: 2022-11-11

## 2022-11-11 RX ADMIN — DIPHENHYDRAMINE HYDROCHLORIDE 50 MG: 50 INJECTION, SOLUTION INTRAMUSCULAR; INTRAVENOUS at 09:03

## 2022-11-11 RX ADMIN — METHYLPREDNISOLONE SODIUM SUCCINATE 60 MG: 125 INJECTION, POWDER, FOR SOLUTION INTRAMUSCULAR; INTRAVENOUS at 11:08

## 2022-11-11 RX ADMIN — SODIUM CHLORIDE, SODIUM LACTATE, POTASSIUM CHLORIDE, AND CALCIUM CHLORIDE 1000 ML: .6; .31; .03; .02 INJECTION, SOLUTION INTRAVENOUS at 08:47

## 2022-11-11 NOTE — PLAN OF CARE
Problem: Potential for Falls  Goal: Patient will remain free of falls  Description: INTERVENTIONS:  - Educate patient/family on patient safety including physical limitations  - Instruct patient to call for assistance with activity   - Consult OT/PT to assist with strengthening/mobility   - Keep Call bell within reach  - Keep bed low and locked with side rails adjusted as appropriate  - Keep care items and personal belongings within reach  - Initiate and maintain comfort rounds  -    - Consider moving patient to room near nurses station  Outcome: Progressing     Problem: DISCHARGE PLANNING  Goal: Discharge to home or other facility with appropriate resources  Description: INTERVENTIONS:  - Identify barriers to discharge w/patient and caregiver  - Arrange for needed discharge resources and transportation as appropriate  - Identify discharge learning needs (meds, wound care, etc )  - Arrange for interpretive services to assist at discharge as needed  - Refer to Case Management Department for coordinating discharge planning if the patient needs post-hospital services based on physician/advanced practitioner order or complex needs related to functional status, cognitive ability, or social support system  Outcome: Progressing     Problem: Knowledge Deficit  Goal: Patient/family/caregiver demonstrates understanding of disease process, treatment plan, medications, and discharge instructions  Description: Complete learning assessment and assess knowledge base    Interventions:  - Provide teaching at level of understanding  - Provide teaching via preferred learning methods  Outcome: Progressing

## 2022-11-14 ENCOUNTER — HOSPITAL ENCOUNTER (OUTPATIENT)
Dept: INFUSION CENTER | Facility: HOSPITAL | Age: 47
Discharge: HOME | End: 2022-11-14

## 2022-11-14 VITALS
OXYGEN SATURATION: 97 % | RESPIRATION RATE: 18 BRPM | HEART RATE: 103 BPM | TEMPERATURE: 98.1 F | SYSTOLIC BLOOD PRESSURE: 104 MMHG | DIASTOLIC BLOOD PRESSURE: 76 MMHG

## 2022-11-14 DIAGNOSIS — R19.7 DIARRHEA, UNSPECIFIED TYPE: ICD-10-CM

## 2022-11-14 DIAGNOSIS — E44.1 MILD PROTEIN-CALORIE MALNUTRITION (HCC): ICD-10-CM

## 2022-11-14 DIAGNOSIS — R63.0 ANOREXIA: Primary | ICD-10-CM

## 2022-11-14 RX ORDER — METHYLPREDNISOLONE SODIUM SUCCINATE 125 MG/2ML
60 INJECTION, POWDER, LYOPHILIZED, FOR SOLUTION INTRAMUSCULAR; INTRAVENOUS ONCE
Status: COMPLETED | OUTPATIENT
Start: 2022-11-14 | End: 2022-11-14

## 2022-11-14 RX ORDER — METHYLPREDNISOLONE SODIUM SUCCINATE 125 MG/2ML
60 INJECTION, POWDER, LYOPHILIZED, FOR SOLUTION INTRAMUSCULAR; INTRAVENOUS ONCE
Status: CANCELLED
Start: 2022-11-15 | End: 2022-11-15

## 2022-11-14 RX ORDER — SODIUM CHLORIDE, SODIUM LACTATE, POTASSIUM CHLORIDE, CALCIUM CHLORIDE 600; 310; 30; 20 MG/100ML; MG/100ML; MG/100ML; MG/100ML
1000 INJECTION, SOLUTION INTRAVENOUS ONCE
Status: CANCELLED
Start: 2022-11-15 | End: 2022-11-15

## 2022-11-14 RX ORDER — SODIUM CHLORIDE, SODIUM LACTATE, POTASSIUM CHLORIDE, CALCIUM CHLORIDE 600; 310; 30; 20 MG/100ML; MG/100ML; MG/100ML; MG/100ML
1000 INJECTION, SOLUTION INTRAVENOUS ONCE
Status: COMPLETED | OUTPATIENT
Start: 2022-11-14 | End: 2022-11-14

## 2022-11-14 RX ADMIN — DIPHENHYDRAMINE HYDROCHLORIDE 50 MG: 50 INJECTION, SOLUTION INTRAMUSCULAR; INTRAVENOUS at 09:29

## 2022-11-14 RX ADMIN — METHYLPREDNISOLONE SODIUM SUCCINATE 60 MG: 125 INJECTION, POWDER, FOR SOLUTION INTRAMUSCULAR; INTRAVENOUS at 10:57

## 2022-11-14 RX ADMIN — SODIUM CHLORIDE, SODIUM LACTATE, POTASSIUM CHLORIDE, AND CALCIUM CHLORIDE 1000 ML: .6; .31; .03; .02 INJECTION, SOLUTION INTRAVENOUS at 09:23

## 2022-11-15 ENCOUNTER — HOSPITAL ENCOUNTER (OUTPATIENT)
Dept: INFUSION CENTER | Facility: HOSPITAL | Age: 47
Discharge: HOME | End: 2022-11-15

## 2022-11-15 VITALS
SYSTOLIC BLOOD PRESSURE: 118 MMHG | DIASTOLIC BLOOD PRESSURE: 87 MMHG | TEMPERATURE: 98.7 F | HEART RATE: 92 BPM | OXYGEN SATURATION: 100 % | RESPIRATION RATE: 17 BRPM

## 2022-11-15 DIAGNOSIS — R63.0 ANOREXIA: Primary | ICD-10-CM

## 2022-11-15 DIAGNOSIS — R19.7 DIARRHEA, UNSPECIFIED TYPE: ICD-10-CM

## 2022-11-15 DIAGNOSIS — E44.1 MILD PROTEIN-CALORIE MALNUTRITION (HCC): ICD-10-CM

## 2022-11-15 RX ORDER — SODIUM CHLORIDE, SODIUM LACTATE, POTASSIUM CHLORIDE, CALCIUM CHLORIDE 600; 310; 30; 20 MG/100ML; MG/100ML; MG/100ML; MG/100ML
1000 INJECTION, SOLUTION INTRAVENOUS ONCE
Status: COMPLETED | OUTPATIENT
Start: 2022-11-15 | End: 2022-11-15

## 2022-11-15 RX ORDER — METHYLPREDNISOLONE SODIUM SUCCINATE 125 MG/2ML
60 INJECTION, POWDER, LYOPHILIZED, FOR SOLUTION INTRAMUSCULAR; INTRAVENOUS ONCE
Status: CANCELLED
Start: 2022-11-16 | End: 2022-11-16

## 2022-11-15 RX ORDER — METHYLPREDNISOLONE SODIUM SUCCINATE 125 MG/2ML
60 INJECTION, POWDER, LYOPHILIZED, FOR SOLUTION INTRAMUSCULAR; INTRAVENOUS ONCE
Status: COMPLETED | OUTPATIENT
Start: 2022-11-15 | End: 2022-11-15

## 2022-11-15 RX ORDER — SODIUM CHLORIDE, SODIUM LACTATE, POTASSIUM CHLORIDE, CALCIUM CHLORIDE 600; 310; 30; 20 MG/100ML; MG/100ML; MG/100ML; MG/100ML
1000 INJECTION, SOLUTION INTRAVENOUS ONCE
Status: CANCELLED
Start: 2022-11-16 | End: 2022-11-16

## 2022-11-15 RX ADMIN — METHYLPREDNISOLONE SODIUM SUCCINATE 60 MG: 125 INJECTION, POWDER, FOR SOLUTION INTRAMUSCULAR; INTRAVENOUS at 15:26

## 2022-11-15 RX ADMIN — DIPHENHYDRAMINE HYDROCHLORIDE 50 MG: 50 INJECTION, SOLUTION INTRAMUSCULAR; INTRAVENOUS at 14:11

## 2022-11-15 RX ADMIN — SODIUM CHLORIDE, SODIUM LACTATE, POTASSIUM CHLORIDE, AND CALCIUM CHLORIDE 1000 ML: .6; .31; .03; .02 INJECTION, SOLUTION INTRAVENOUS at 13:40

## 2022-11-16 ENCOUNTER — HOSPITAL ENCOUNTER (OUTPATIENT)
Dept: INFUSION CENTER | Facility: HOSPITAL | Age: 47
Discharge: HOME | End: 2022-11-16

## 2022-11-16 VITALS
DIASTOLIC BLOOD PRESSURE: 79 MMHG | TEMPERATURE: 97.9 F | RESPIRATION RATE: 17 BRPM | SYSTOLIC BLOOD PRESSURE: 118 MMHG | OXYGEN SATURATION: 98 % | HEART RATE: 90 BPM

## 2022-11-16 DIAGNOSIS — R19.7 DIARRHEA, UNSPECIFIED TYPE: ICD-10-CM

## 2022-11-16 DIAGNOSIS — R63.0 ANOREXIA: Primary | ICD-10-CM

## 2022-11-16 DIAGNOSIS — E44.1 MILD PROTEIN-CALORIE MALNUTRITION (HCC): ICD-10-CM

## 2022-11-16 RX ORDER — SODIUM CHLORIDE, SODIUM LACTATE, POTASSIUM CHLORIDE, CALCIUM CHLORIDE 600; 310; 30; 20 MG/100ML; MG/100ML; MG/100ML; MG/100ML
1000 INJECTION, SOLUTION INTRAVENOUS ONCE
Status: COMPLETED | OUTPATIENT
Start: 2022-11-16 | End: 2022-11-16

## 2022-11-16 RX ORDER — SODIUM CHLORIDE, SODIUM LACTATE, POTASSIUM CHLORIDE, CALCIUM CHLORIDE 600; 310; 30; 20 MG/100ML; MG/100ML; MG/100ML; MG/100ML
1000 INJECTION, SOLUTION INTRAVENOUS ONCE
Status: CANCELLED
Start: 2022-11-17 | End: 2022-11-17

## 2022-11-16 RX ORDER — EPINEPHRINE 1 MG/ML
0.3 INJECTION, SOLUTION, CONCENTRATE INTRAVENOUS
Status: CANCELLED | OUTPATIENT
Start: 2022-11-30

## 2022-11-16 RX ORDER — METHYLPREDNISOLONE SODIUM SUCCINATE 125 MG/2ML
60 INJECTION, POWDER, LYOPHILIZED, FOR SOLUTION INTRAMUSCULAR; INTRAVENOUS ONCE
Status: COMPLETED | OUTPATIENT
Start: 2022-11-16 | End: 2022-11-16

## 2022-11-16 RX ORDER — METHYLPREDNISOLONE SODIUM SUCCINATE 125 MG/2ML
60 INJECTION, POWDER, LYOPHILIZED, FOR SOLUTION INTRAMUSCULAR; INTRAVENOUS ONCE
Status: CANCELLED
Start: 2022-11-17 | End: 2022-11-17

## 2022-11-16 RX ADMIN — METHYLPREDNISOLONE SODIUM SUCCINATE 60 MG: 125 INJECTION, POWDER, FOR SOLUTION INTRAMUSCULAR; INTRAVENOUS at 11:16

## 2022-11-16 RX ADMIN — DIPHENHYDRAMINE HYDROCHLORIDE 50 MG: 50 INJECTION, SOLUTION INTRAMUSCULAR; INTRAVENOUS at 09:31

## 2022-11-16 RX ADMIN — SODIUM CHLORIDE, SODIUM LACTATE, POTASSIUM CHLORIDE, AND CALCIUM CHLORIDE 1000 ML: .6; .31; .03; .02 INJECTION, SOLUTION INTRAVENOUS at 08:25

## 2022-11-17 ENCOUNTER — HOSPITAL ENCOUNTER (OUTPATIENT)
Dept: INFUSION CENTER | Facility: HOSPITAL | Age: 47
Discharge: HOME | End: 2022-11-17

## 2022-11-17 VITALS
RESPIRATION RATE: 17 BRPM | HEART RATE: 89 BPM | SYSTOLIC BLOOD PRESSURE: 124 MMHG | OXYGEN SATURATION: 98 % | TEMPERATURE: 97.7 F | DIASTOLIC BLOOD PRESSURE: 76 MMHG

## 2022-11-17 DIAGNOSIS — E44.1 MILD PROTEIN-CALORIE MALNUTRITION (HCC): ICD-10-CM

## 2022-11-17 DIAGNOSIS — R19.7 DIARRHEA, UNSPECIFIED TYPE: ICD-10-CM

## 2022-11-17 DIAGNOSIS — R63.0 ANOREXIA: Primary | ICD-10-CM

## 2022-11-17 RX ORDER — SODIUM CHLORIDE, SODIUM LACTATE, POTASSIUM CHLORIDE, CALCIUM CHLORIDE 600; 310; 30; 20 MG/100ML; MG/100ML; MG/100ML; MG/100ML
1000 INJECTION, SOLUTION INTRAVENOUS ONCE
Status: CANCELLED
Start: 2022-11-21 | End: 2022-11-18

## 2022-11-17 RX ORDER — EPINEPHRINE 1 MG/ML
0.3 INJECTION, SOLUTION, CONCENTRATE INTRAVENOUS
Status: CANCELLED | OUTPATIENT
Start: 2022-11-30

## 2022-11-17 RX ORDER — SODIUM CHLORIDE, SODIUM LACTATE, POTASSIUM CHLORIDE, CALCIUM CHLORIDE 600; 310; 30; 20 MG/100ML; MG/100ML; MG/100ML; MG/100ML
1000 INJECTION, SOLUTION INTRAVENOUS ONCE
Status: COMPLETED | OUTPATIENT
Start: 2022-11-17 | End: 2022-11-17

## 2022-11-17 RX ORDER — METHYLPREDNISOLONE SODIUM SUCCINATE 125 MG/2ML
60 INJECTION, POWDER, LYOPHILIZED, FOR SOLUTION INTRAMUSCULAR; INTRAVENOUS ONCE
Status: COMPLETED | OUTPATIENT
Start: 2022-11-17 | End: 2022-11-17

## 2022-11-17 RX ORDER — METHYLPREDNISOLONE SODIUM SUCCINATE 125 MG/2ML
60 INJECTION, POWDER, LYOPHILIZED, FOR SOLUTION INTRAMUSCULAR; INTRAVENOUS ONCE
Status: CANCELLED
Start: 2022-11-21 | End: 2022-11-18

## 2022-11-17 RX ADMIN — SODIUM CHLORIDE, SODIUM LACTATE, POTASSIUM CHLORIDE, AND CALCIUM CHLORIDE 1000 ML: .6; .31; .03; .02 INJECTION, SOLUTION INTRAVENOUS at 09:35

## 2022-11-17 RX ADMIN — DIPHENHYDRAMINE HYDROCHLORIDE 50 MG: 50 INJECTION, SOLUTION INTRAMUSCULAR; INTRAVENOUS at 11:08

## 2022-11-17 RX ADMIN — METHYLPREDNISOLONE SODIUM SUCCINATE 60 MG: 125 INJECTION, POWDER, FOR SOLUTION INTRAMUSCULAR; INTRAVENOUS at 12:10

## 2022-11-22 ENCOUNTER — HOSPITAL ENCOUNTER (OUTPATIENT)
Dept: INFUSION CENTER | Facility: HOSPITAL | Age: 47
Discharge: HOME | End: 2022-11-22

## 2022-11-22 VITALS
HEART RATE: 104 BPM | SYSTOLIC BLOOD PRESSURE: 109 MMHG | RESPIRATION RATE: 17 BRPM | OXYGEN SATURATION: 96 % | TEMPERATURE: 98.8 F | DIASTOLIC BLOOD PRESSURE: 71 MMHG

## 2022-11-22 DIAGNOSIS — E44.1 MILD PROTEIN-CALORIE MALNUTRITION (HCC): ICD-10-CM

## 2022-11-22 DIAGNOSIS — R19.7 DIARRHEA, UNSPECIFIED TYPE: ICD-10-CM

## 2022-11-22 DIAGNOSIS — R63.0 ANOREXIA: Primary | ICD-10-CM

## 2022-11-22 RX ORDER — METHYLPREDNISOLONE SODIUM SUCCINATE 125 MG/2ML
60 INJECTION, POWDER, LYOPHILIZED, FOR SOLUTION INTRAMUSCULAR; INTRAVENOUS ONCE
Status: CANCELLED
Start: 2022-11-23 | End: 2022-11-23

## 2022-11-22 RX ORDER — SODIUM CHLORIDE, SODIUM LACTATE, POTASSIUM CHLORIDE, CALCIUM CHLORIDE 600; 310; 30; 20 MG/100ML; MG/100ML; MG/100ML; MG/100ML
1000 INJECTION, SOLUTION INTRAVENOUS ONCE
Status: CANCELLED
Start: 2022-11-23 | End: 2022-11-23

## 2022-11-22 RX ORDER — METHYLPREDNISOLONE SODIUM SUCCINATE 125 MG/2ML
60 INJECTION, POWDER, LYOPHILIZED, FOR SOLUTION INTRAMUSCULAR; INTRAVENOUS ONCE
Status: COMPLETED | OUTPATIENT
Start: 2022-11-22 | End: 2022-11-22

## 2022-11-22 RX ORDER — SODIUM CHLORIDE, SODIUM LACTATE, POTASSIUM CHLORIDE, CALCIUM CHLORIDE 600; 310; 30; 20 MG/100ML; MG/100ML; MG/100ML; MG/100ML
1000 INJECTION, SOLUTION INTRAVENOUS ONCE
Status: COMPLETED | OUTPATIENT
Start: 2022-11-22 | End: 2022-11-22

## 2022-11-22 RX ADMIN — DIPHENHYDRAMINE HYDROCHLORIDE 50 MG: 50 INJECTION, SOLUTION INTRAMUSCULAR; INTRAVENOUS at 09:24

## 2022-11-22 RX ADMIN — METHYLPREDNISOLONE SODIUM SUCCINATE 60 MG: 125 INJECTION, POWDER, FOR SOLUTION INTRAMUSCULAR; INTRAVENOUS at 11:05

## 2022-11-22 RX ADMIN — SODIUM CHLORIDE, SODIUM LACTATE, POTASSIUM CHLORIDE, AND CALCIUM CHLORIDE 1000 ML: .6; .31; .03; .02 INJECTION, SOLUTION INTRAVENOUS at 08:20

## 2022-11-23 ENCOUNTER — OFFICE VISIT (OUTPATIENT)
Dept: GASTROENTEROLOGY | Facility: CLINIC | Age: 47
End: 2022-11-23

## 2022-11-23 ENCOUNTER — HOSPITAL ENCOUNTER (OUTPATIENT)
Dept: INFUSION CENTER | Facility: CLINIC | Age: 47
Discharge: HOME | End: 2022-11-23

## 2022-11-23 VITALS
HEART RATE: 88 BPM | WEIGHT: 155 LBS | BODY MASS INDEX: 24.91 KG/M2 | TEMPERATURE: 99.3 F | HEIGHT: 66 IN | OXYGEN SATURATION: 97 % | DIASTOLIC BLOOD PRESSURE: 81 MMHG | SYSTOLIC BLOOD PRESSURE: 117 MMHG

## 2022-11-23 VITALS
SYSTOLIC BLOOD PRESSURE: 124 MMHG | TEMPERATURE: 97.6 F | RESPIRATION RATE: 16 BRPM | DIASTOLIC BLOOD PRESSURE: 86 MMHG | HEART RATE: 101 BPM

## 2022-11-23 DIAGNOSIS — L93.2 DRUG-INDUCED LUPUS ERYTHEMATOSUS: ICD-10-CM

## 2022-11-23 DIAGNOSIS — E44.1 MILD PROTEIN-CALORIE MALNUTRITION (HCC): ICD-10-CM

## 2022-11-23 DIAGNOSIS — D84.9 IMMUNOSUPPRESSED STATUS (HCC): ICD-10-CM

## 2022-11-23 DIAGNOSIS — D89.40 MAST CELL ACTIVATION SYNDROME (HCC): ICD-10-CM

## 2022-11-23 DIAGNOSIS — T50.905A DRUG-INDUCED LUPUS ERYTHEMATOSUS: ICD-10-CM

## 2022-11-23 DIAGNOSIS — K50.119 CROHN'S DISEASE OF COLON WITH COMPLICATION (HCC): Primary | ICD-10-CM

## 2022-11-23 DIAGNOSIS — R19.7 DIARRHEA, UNSPECIFIED TYPE: ICD-10-CM

## 2022-11-23 DIAGNOSIS — R63.0 ANOREXIA: Primary | ICD-10-CM

## 2022-11-23 RX ORDER — METHYLPREDNISOLONE SODIUM SUCCINATE 125 MG/2ML
60 INJECTION, POWDER, LYOPHILIZED, FOR SOLUTION INTRAMUSCULAR; INTRAVENOUS ONCE
Status: CANCELLED
Start: 2022-11-24 | End: 2022-11-24

## 2022-11-23 RX ORDER — METHYLPREDNISOLONE SODIUM SUCCINATE 125 MG/2ML
60 INJECTION, POWDER, LYOPHILIZED, FOR SOLUTION INTRAMUSCULAR; INTRAVENOUS ONCE
Status: COMPLETED | OUTPATIENT
Start: 2022-11-23 | End: 2022-11-23

## 2022-11-23 RX ORDER — SODIUM CHLORIDE, SODIUM LACTATE, POTASSIUM CHLORIDE, CALCIUM CHLORIDE 600; 310; 30; 20 MG/100ML; MG/100ML; MG/100ML; MG/100ML
1000 INJECTION, SOLUTION INTRAVENOUS ONCE
Status: CANCELLED
Start: 2022-11-24 | End: 2022-11-24

## 2022-11-23 RX ORDER — SODIUM CHLORIDE, SODIUM LACTATE, POTASSIUM CHLORIDE, CALCIUM CHLORIDE 600; 310; 30; 20 MG/100ML; MG/100ML; MG/100ML; MG/100ML
1000 INJECTION, SOLUTION INTRAVENOUS ONCE
Status: COMPLETED | OUTPATIENT
Start: 2022-11-23 | End: 2022-11-23

## 2022-11-23 RX ADMIN — DIPHENHYDRAMINE HYDROCHLORIDE 50 MG: 50 INJECTION, SOLUTION INTRAMUSCULAR; INTRAVENOUS at 10:13

## 2022-11-23 RX ADMIN — METHYLPREDNISOLONE SODIUM SUCCINATE 60 MG: 125 INJECTION, POWDER, FOR SOLUTION INTRAMUSCULAR; INTRAVENOUS at 12:23

## 2022-11-23 RX ADMIN — SODIUM CHLORIDE, POTASSIUM CHLORIDE, SODIUM LACTATE AND CALCIUM CHLORIDE 1000 ML: 600; 310; 30; 20 INJECTION, SOLUTION INTRAVENOUS at 09:55

## 2022-11-23 NOTE — PROGRESS NOTES
[facility] - Outpatient Follow-up Note  Andre Jenkins 50 y o  female MRN: [id number]  Encounter: [id number]          ASSESSMENT AND PLAN:    Andre Jenkins is a 50 y o  female with hereditary alpha tryptasemia, Hashimotos, inflammatory bowel disease (likely Crohn's disease versus indeterminate colitis versus ulcerative colitis) previously on anti TNF therapy but complicated by a 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, prior diagnosis of mast cell activation syndrome at [facility] and [facility], being managed on Stelara for her inflammatory bowel disease and Xolair for other conditions, presenting for follow-up  In terms of gastrointestinal symptoms she is doing well  She does continue to notice an increase of allergic reactions to oral medications, including pepcid and pantoprazole  Endoscopy and colonoscopy from October 2021 was grossly normal and biopsies with patchy chronic active left-sided colitis and some quiescent colitis  MRI MRCP with no interval progression of mild central intrahepatic and extrahepatic bile duct prominence  Prior abdominal ultrasound with cholecystectomy  CMP normal   Most recent CBC with white blood cell count 14 5 but normal hemoglobin, MCV, platelets  Absolute neutrophils were elevated but % was normal at 72  TSH was low and free T3 was low but free T4 was normal   Most recent CRP was 0 9  Negative hepatitis profile in September  QuantiFERON gold negative in September 1  Crohn's disease of colon with complication ([facility] )    2  Immunosuppressed status ([facility] )    3  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[facility] )    4  Drug-induced lupus erythematosus        No orders of the defined types were placed in this encounter      Continue Stelara every 21 days  Defer IV protonix for now but she will discuss with Dr Ronaldo Johnson  Continue pantoprazole 3 days a week  Stop pepcid  Next QuantiFERON gold/hepatitis profile September 2023  Continue intermittent blood work as she has been doing  Can repeat colonoscopy in 2024  Consider upper GI series or MR enterography for evaluation of small bowel disease  Quant gold and hepatitis panel 9/2023  She is receiving intermittent infusions of lactated Ringer's as well as Solu-Medrol and Benadryl)  Can change to 1L of LR and NS monthly  Continue vitamin supplementation  Continue to follow up with multiple specialists including Rheumatology, Neurology, Hematology, pulmonology, Cardiology, Endocrinology as needed    IBD HCM to be discussed in more detail at future visit  Avoid live virus vaccines  Yearly flu shot  COVID vaccine and booster  Pneumonia vaccine  Shingrix  Routine PAP smears and mammograms  Routine skin exams with the dermatologist    I have spent 40 minutes with Patient  today in which greater than 50% of this time was spent in counseling/coordination of care regarding Risks and benefits of tx options, Intructions for management and Impressions  ______________________________________________________________________    SUBJECTIVE:    Aysha Irizarry is a 50 y o  female who presents with complaint of IBD here for follow-up  She is doing pretty well  She is on hydration and getting IV meds  She takes less oral medication and now when she takes it she can tell when she is getting a flare  She takes pantoprazole in the morning and notices a reaction with some tightness and a sense it is stuck in her throat  She believes she is getting a reaction to it  Same thing happens with pepcid  Heartburn better with less tomatoes  She watches what she eats  She is going 3 days a week  Last week was a bit rough for her, outside raking, she is a mouth breather and on Monday was more difficult  Appointments are mostly Monday, Wednesday and Friday  She will be seeing her allergist  She does not have a crash week, but a day with some joint pains and some GI symptoms  REVIEW OF SYSTEMS IS OTHERWISE NEGATIVE    10 point ROS reviewed and negative, except as above      Historical Information   Past Medical History:   Diagnosis Date   • Anemia 2007   • Anxiety    • Asthma    • Bile duct stricture 2014    Sphincter of oddi dysfunction   • Chronic kidney disease    • Colon polyp 1998   • Crohn's colitis ([facility] )    • Deep vein thrombosis ([facility] )    • 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   • Generalized anxiety disorder 08/26/2017   • GERD (gastroesophageal reflux disease) 1995   • GI (gastrointestinal bleed) 1994   • Heart murmur    • Irritable bowel syndrome 2016   • Lactose intolerance 1982   • Mast cell disease    • Migraine    • Myocardial infarction [facility])     NSTEMI  pt denies, documented in cardio note   • Pancreatitis     sphinger of    • Psychiatric disorder    • RA (r[facility]umatoid arthritis) ([facility] )    • Ulcerative colitis ([facility] )      Past Surgical History:   Procedure Laterality Date   • ABDOMINAL SURGERY  2017   • APPENDECTOMY     • CHOLECYSTECTOMY     • COLONOSCOPY  2019   • EGD AND COLONOSCOPY N/A 09/12/2017    Procedure: EGD AND COLONOSCOPY;  Surgeon: Maria Antonia Gutierrez MD;  Location: [facility]; Service: Gastroenterology   • ERCP N/A 09/15/2017    Procedure: ENDOSCOPIC RETROGRADE CHOLANGIOPANCREATOGRAPHY (ERCP); Surgeon: Albert Graham MD;  Location: [facility];   Service: Gastroenterology   • HYSTERECTOMY     • KNEE SURGERY Right    • LAPAROSCOPIC ENDOMETRIOSIS FULGURATION     • ORIF ANKLE FRACTURE Left    • GA KNEE SCOPE,MED/LAT MENISECTOMY Left 05/12/2017    Procedure: POSSIBLE MEDIAL MENISECTOMY;  Surgeon: Jose Garay MD;  Location: [facility];  Service: Orthopedics   • GA KNEE [address] CART Left 05/12/2017    Procedure: KNEE ARTHROSCOPY EVALUATION, CHONDROPLASTY;  Surgeon: Jose Garay MD;  Location: [facility];  Service: Orthopedics   • GA LAP,DIAGNOSTIC ABDOMEN N/A 12/12/2017    Procedure: LAPAROSCOPY DIAGNOSTIC  WITH LYSIS OF ADHESIONS;  Surgeon: Donny Solis DO;  [facility];  Service: General   • UPPER GASTROINTESTINAL ENDOSCOPY  2019     Social History   Social History     Substance and Sexual Activity   Alcohol Use Never     Social History     Substance and Sexual Activity   Drug Use Never     Social History     Tobacco Use   Smoking Status Never   Smokeless Tobacco Never     Family History   Problem Relation Age of Onset   • Ulcerative colitis Mother    • Arthritis Mother    • Colon polyps Mother    • Heart failure Father    • Neuropathy Father    • Macular degeneration Father    • Diabetes Father    • Early death Father    • Vision loss Father    • Asthma Daughter    • Hypothyroidism Daughter    • Heart disease Maternal Grandmother    • Arthritis Maternal Grandmother    • No Known Problems Maternal Grandfather    • Arthritis Paternal Grandmother    • Macular degeneration Paternal Grandmother    • Vision loss Paternal Grandmother    • Lymphoma Paternal Grandfather    • Cancer Paternal Grandfather    • Early death Paternal Grandfather    • Breast cancer Maternal Aunt    • Cancer Maternal Aunt    • Miscarriages / Stillbirths Maternal Aunt    • Heart failure Paternal Aunt    • Heart failure Paternal Aunt    • Irritable bowel syndrome Paternal [de-identified]    • Breast cancer Paternal Aunt    • Breast cancer additional onset Paternal [de-identified]    • Hypothyroidism Paternal Aunt    • Cancer Paternal Aunt    • No Known Problems Son    • No Known Problems Son    • Kidney disease Brother    • Depression Paternal Uncle    • Early death Paternal Uncle        Meds/Allergies       Current Outpatient Medications:   •  Alfalfa 650 MG TABS  •  B-D 3CC LUER-SHAMEKA SYR 25GX1" 25G X 1" 3 ML MISC  •  butalbital-acetaminophen-caffeine (FIORICET,ESGIC) -40 mg per tablet  •  cetirizine (ZyrTEC) 10 mg tablet  •  cyanocobalamin 1,000 mcg/mL  •  diphenhydrAMINE (BENADRYL) 25 mg tablet  •  EPINEPHrine (EPIPEN 2-MARYSOL IJ)  •  furosemide (LASIX) 20 mg tablet  •  hydrOXYzine HCL (ATARAX) 25 mg tablet  •  Lactobacillus (PROBIOTIC ACIDOPHILUS PO)  • LORazepam (ATIVAN) 1 mg tablet  •  montelukast (SINGULAIR) 10 mg tablet  •  NON FORMULARY  •  nystatin (MYCOSTATIN) 500,000 units/5 mL suspension  •  omalizumab (XOLAIR) 150 mg  •  pantoprazole (PROTONIX) 40 mg tablet  •  predniSONE 20 mg tablet  •  Stelara 90 MG/ML subcutaneous injection  •  Synthroid 50 MCG tablet  •  TechLite Lancets MISC  •  diclofenac (VOLTAREN) 75 mg EC tablet  •  Erenumab-aooe (Aimovig) 140 MG/ML SOAJ  •  ergocalciferol (VITAMIN D2) 50,000 units  •  famotidine (PEPCID) 40 MG tablet  •  levalbuterol (XOPENEX HFA) 45 mcg/act inhaler  •  liothyronine (CYTOMEL) 5 mcg tablet  •  ondansetron (ZOFRAN-ODT) 4 mg disintegrating tablet  •  Riboflavin (Vitamin B-2) 100 MG TABS  •  simethicone (MYLICON) 373 MG chewable tablet  •  Synthroid 75 MCG tablet  No current facility-administered medications for this visit      Allergies   Allergen Reactions   • Amitriptyline Anaphylaxis     According to the patient she had nphylaxis   • Aspergillus Fumigatus Other (See Comments), Hives and Swelling   • Azathioprine Other (See Comments) and Anaphylaxis     pancreatitis  According to patient she needed Epipen   • Buspar [Buspirone] Hives and Shortness Of Breath   • Corn Dextrin Anaphylaxis     Any corn based products   • Eggs Or Egg-Derived Products - Food Allergy Anaphylaxis   • Gelatin - Food Allergy Anaphylaxis   • Heparin Hives     Painful welts    • Lactated Ringers Shortness Of Breath     Pt questions this allergy, pt has received LR multiple times without reaction   • Lavender Oil Anaphylaxis   • Malto Dextrin [Dextrin] Anaphylaxis   • Other Anaphylaxis     Gel caps, maltodextrose, dextrose,grapes, lavender    • Penicillium Notatum Shortness Of Breath and Swelling   • Sumatriptan Anaphylaxis     Bradycardia, sob, back pressure, head pain,throat tightness  According to the patient she had anphylaxis   • Contrast [Iodinated Diagnostic Agents] Other (See Comments)     Pt states needs to be premedicated prior to injection   • Corn Oil - Food Allergy - Food Allergy Hives   • Humira [Adalimumab] Other (See Comments)     "I develop drug induced lupus"   • Ibuprofen Hives and Throat Swelling   • Polyethylene Glycol Diarrhea and Vomiting   • Red Dye - Food Allergy Other (See Comments)     intolerance   • Remicade [Infliximab] Other (See Comments)     "I develop drug induced lupus"   • Sulfa Antibiotics Hives and Other (See Comments)   • Sulfate Hives   • Sulfites - Food Allergy Hives   • Chlorhexidine Itching   • Histamine Hives, Rash and Other (See Comments)     Intolerance             Objective     Blood pressure 117/81, pulse 88, temperature 99 3 °F (37 4 °C), temperature source Tympanic, height 5' 5 5" (1 664 m), weight 70 3 kg (155 lb), SpO2 97 %, not currently breastfeeding  Body mass index is 25 4 kg/m²  PHYSICAL EXAMINATION:    General Appearance:   Alert, cooperative, no distress   HEENT:  Normocephalic, atraumatic, anicteric  Neck supple, symmetrical, trachea midline  Lungs:   Equal chest rise and unlabored breathing, normal effort, no coughing  Cardiovascular:   No visualized JVD  Abdomen:   No abdominal distension  Skin:   No jaundice, rashes, or lesions  Musculoskeletal:   Normal range of motion visualized  Psych:  Normal affect and normal insight  Neuro:  Alert and appropriate  Lab Results:   No visits with results within 1 Day(s) from this visit     Latest known visit with results is:   Transcribe Orders on 11/11/2022   Component Date Value   • Scan Result 11/11/2022 See written result report    • 24H Urine Volume 11/11/2022 2,650    • Scan Result 11/11/2022 See written result report    • 24H Urine Volume 11/11/2022 2,650        Lab Results   Component Value Date    WBC 14 50 (H) 11/09/2022    HGB 14 1 11/09/2022    HCT 43 2 11/09/2022    MCV 91 11/09/2022     11/09/2022       Lab Results   Component Value Date     01/16/2015    SODIUM 138 09/30/2022    K 3 3 (L) 09/30/2022  09/30/2022    CO2 29 09/30/2022    ANIONGAP 12 01/16/2015    AGAP 8 09/30/2022    BUN 18 09/30/2022    CREATININE 0 72 09/30/2022    GLUC 90 09/30/2022    GLUF 79 03/23/2022    CALCIUM 9 0 09/30/2022    AST 22 09/30/2022    ALT 40 09/30/2022    ALKPHOS 68 09/30/2022    PROT 7 7 01/16/2015    TP 7 4 09/30/2022    BILITOT 0 7 01/16/2015    TBILI 0 19 (L) 09/30/2022    EGFR 98 09/30/2022       Lab Results   Component Value Date    CRP 0 9 09/30/2022       Lab Results   Component Value Date    RFP3DNCOBCAB 0 355 (L) 11/09/2022       Lab Results   Component Value Date    IRON 58 09/01/2022    TIBC 355 11/01/2019    FERRITIN 42 11/09/2020       Radiology Results:   No results found

## 2022-11-25 ENCOUNTER — HOSPITAL ENCOUNTER (OUTPATIENT)
Dept: INFUSION CENTER | Facility: HOSPITAL | Age: 47
End: 2022-11-25

## 2022-11-25 VITALS
SYSTOLIC BLOOD PRESSURE: 111 MMHG | OXYGEN SATURATION: 98 % | HEART RATE: 76 BPM | TEMPERATURE: 98.2 F | RESPIRATION RATE: 17 BRPM | DIASTOLIC BLOOD PRESSURE: 73 MMHG

## 2022-11-25 DIAGNOSIS — R63.0 ANOREXIA: Primary | ICD-10-CM

## 2022-11-25 DIAGNOSIS — E44.1 MILD PROTEIN-CALORIE MALNUTRITION (HCC): ICD-10-CM

## 2022-11-25 DIAGNOSIS — R19.7 DIARRHEA, UNSPECIFIED TYPE: ICD-10-CM

## 2022-11-25 RX ORDER — METHYLPREDNISOLONE SODIUM SUCCINATE 125 MG/2ML
60 INJECTION, POWDER, LYOPHILIZED, FOR SOLUTION INTRAMUSCULAR; INTRAVENOUS ONCE
Status: COMPLETED | OUTPATIENT
Start: 2022-11-25 | End: 2022-11-25

## 2022-11-25 RX ORDER — SODIUM CHLORIDE, SODIUM LACTATE, POTASSIUM CHLORIDE, CALCIUM CHLORIDE 600; 310; 30; 20 MG/100ML; MG/100ML; MG/100ML; MG/100ML
1000 INJECTION, SOLUTION INTRAVENOUS ONCE
Status: COMPLETED | OUTPATIENT
Start: 2022-11-25 | End: 2022-11-25

## 2022-11-25 RX ORDER — METHYLPREDNISOLONE SODIUM SUCCINATE 125 MG/2ML
60 INJECTION, POWDER, LYOPHILIZED, FOR SOLUTION INTRAMUSCULAR; INTRAVENOUS ONCE
Status: CANCELLED
Start: 2022-11-28 | End: 2022-11-26

## 2022-11-25 RX ORDER — SODIUM CHLORIDE, SODIUM LACTATE, POTASSIUM CHLORIDE, CALCIUM CHLORIDE 600; 310; 30; 20 MG/100ML; MG/100ML; MG/100ML; MG/100ML
1000 INJECTION, SOLUTION INTRAVENOUS ONCE
Status: CANCELLED
Start: 2022-11-28 | End: 2022-11-26

## 2022-11-25 RX ADMIN — SODIUM CHLORIDE, SODIUM LACTATE, POTASSIUM CHLORIDE, AND CALCIUM CHLORIDE 1000 ML: .6; .31; .03; .02 INJECTION, SOLUTION INTRAVENOUS at 09:00

## 2022-11-25 RX ADMIN — DIPHENHYDRAMINE HYDROCHLORIDE 50 MG: 50 INJECTION, SOLUTION INTRAMUSCULAR; INTRAVENOUS at 10:05

## 2022-11-25 RX ADMIN — METHYLPREDNISOLONE SODIUM SUCCINATE 60 MG: 125 INJECTION, POWDER, FOR SOLUTION INTRAMUSCULAR; INTRAVENOUS at 11:37

## 2022-11-28 ENCOUNTER — DOCTOR'S OFFICE (OUTPATIENT)
Dept: URBAN - NONMETROPOLITAN AREA CLINIC 1 | Facility: CLINIC | Age: 47
Setting detail: OPHTHALMOLOGY
End: 2022-11-28
Payer: COMMERCIAL

## 2022-11-28 ENCOUNTER — HOSPITAL ENCOUNTER (OUTPATIENT)
Dept: INFUSION CENTER | Facility: HOSPITAL | Age: 47
Discharge: HOME | End: 2022-11-28

## 2022-11-28 VITALS
SYSTOLIC BLOOD PRESSURE: 118 MMHG | DIASTOLIC BLOOD PRESSURE: 79 MMHG | HEART RATE: 112 BPM | RESPIRATION RATE: 17 BRPM | OXYGEN SATURATION: 97 % | TEMPERATURE: 97.9 F

## 2022-11-28 DIAGNOSIS — E44.1 MILD PROTEIN-CALORIE MALNUTRITION (HCC): ICD-10-CM

## 2022-11-28 DIAGNOSIS — R63.0 ANOREXIA: Primary | ICD-10-CM

## 2022-11-28 DIAGNOSIS — H46.8: ICD-10-CM

## 2022-11-28 DIAGNOSIS — R19.7 DIARRHEA, UNSPECIFIED TYPE: ICD-10-CM

## 2022-11-28 DIAGNOSIS — H43.813: ICD-10-CM

## 2022-11-28 DIAGNOSIS — H40.003: ICD-10-CM

## 2022-11-28 PROCEDURE — 92134 CPTRZ OPH DX IMG PST SGM RTA: CPT | Performed by: OPHTHALMOLOGY

## 2022-11-28 PROCEDURE — 99213 OFFICE O/P EST LOW 20 MIN: CPT | Performed by: OPHTHALMOLOGY

## 2022-11-28 RX ORDER — METHYLPREDNISOLONE SODIUM SUCCINATE 125 MG/2ML
60 INJECTION, POWDER, LYOPHILIZED, FOR SOLUTION INTRAMUSCULAR; INTRAVENOUS ONCE
Status: CANCELLED
Start: 2022-11-30 | End: 2022-11-29

## 2022-11-28 RX ORDER — SODIUM CHLORIDE, SODIUM LACTATE, POTASSIUM CHLORIDE, CALCIUM CHLORIDE 600; 310; 30; 20 MG/100ML; MG/100ML; MG/100ML; MG/100ML
1000 INJECTION, SOLUTION INTRAVENOUS ONCE
Status: CANCELLED
Start: 2022-11-30 | End: 2022-11-29

## 2022-11-28 RX ORDER — METHYLPREDNISOLONE SODIUM SUCCINATE 125 MG/2ML
60 INJECTION, POWDER, LYOPHILIZED, FOR SOLUTION INTRAMUSCULAR; INTRAVENOUS ONCE
Status: COMPLETED | OUTPATIENT
Start: 2022-11-28 | End: 2022-11-28

## 2022-11-28 RX ORDER — SODIUM CHLORIDE, SODIUM LACTATE, POTASSIUM CHLORIDE, CALCIUM CHLORIDE 600; 310; 30; 20 MG/100ML; MG/100ML; MG/100ML; MG/100ML
1000 INJECTION, SOLUTION INTRAVENOUS ONCE
Status: COMPLETED | OUTPATIENT
Start: 2022-11-28 | End: 2022-11-28

## 2022-11-28 RX ADMIN — METHYLPREDNISOLONE SODIUM SUCCINATE 60 MG: 125 INJECTION, POWDER, FOR SOLUTION INTRAMUSCULAR; INTRAVENOUS at 16:24

## 2022-11-28 RX ADMIN — SODIUM CHLORIDE, POTASSIUM CHLORIDE, SODIUM LACTATE AND CALCIUM CHLORIDE 1000 ML: 600; 310; 30; 20 INJECTION, SOLUTION INTRAVENOUS at 13:37

## 2022-11-28 RX ADMIN — DIPHENHYDRAMINE HYDROCHLORIDE 50 MG: 50 INJECTION, SOLUTION INTRAMUSCULAR; INTRAVENOUS at 13:57

## 2022-11-28 ASSESSMENT — CONFRONTATIONAL VISUAL FIELD TEST (CVF)
OS_FINDINGS: FULL
OD_FINDINGS: FULL

## 2022-11-28 ASSESSMENT — VISUAL ACUITY
OS_BCVA: 20/25
OD_BCVA: 20/25-2

## 2022-11-30 ENCOUNTER — HOSPITAL ENCOUNTER (OUTPATIENT)
Dept: INFUSION CENTER | Facility: HOSPITAL | Age: 47
Discharge: HOME | End: 2022-11-30

## 2022-11-30 VITALS
HEART RATE: 89 BPM | RESPIRATION RATE: 18 BRPM | DIASTOLIC BLOOD PRESSURE: 86 MMHG | OXYGEN SATURATION: 97 % | TEMPERATURE: 97.8 F | SYSTOLIC BLOOD PRESSURE: 123 MMHG

## 2022-11-30 DIAGNOSIS — L50.1 CHRONIC IDIOPATHIC URTICARIA: ICD-10-CM

## 2022-11-30 DIAGNOSIS — E44.1 MILD PROTEIN-CALORIE MALNUTRITION (HCC): ICD-10-CM

## 2022-11-30 DIAGNOSIS — R19.7 DIARRHEA, UNSPECIFIED TYPE: ICD-10-CM

## 2022-11-30 DIAGNOSIS — R63.0 ANOREXIA: Primary | ICD-10-CM

## 2022-11-30 RX ORDER — SODIUM CHLORIDE, SODIUM LACTATE, POTASSIUM CHLORIDE, CALCIUM CHLORIDE 600; 310; 30; 20 MG/100ML; MG/100ML; MG/100ML; MG/100ML
1000 INJECTION, SOLUTION INTRAVENOUS ONCE
Status: CANCELLED
Start: 2022-12-01 | End: 2022-12-01

## 2022-11-30 RX ORDER — EPINEPHRINE 1 MG/ML
0.3 INJECTION, SOLUTION, CONCENTRATE INTRAVENOUS
Status: DISCONTINUED | OUTPATIENT
Start: 2022-11-30 | End: 2022-12-03 | Stop reason: HOSPADM

## 2022-11-30 RX ORDER — SODIUM CHLORIDE, SODIUM LACTATE, POTASSIUM CHLORIDE, CALCIUM CHLORIDE 600; 310; 30; 20 MG/100ML; MG/100ML; MG/100ML; MG/100ML
1000 INJECTION, SOLUTION INTRAVENOUS ONCE
Status: COMPLETED | OUTPATIENT
Start: 2022-11-30 | End: 2022-11-30

## 2022-11-30 RX ORDER — METHYLPREDNISOLONE SODIUM SUCCINATE 125 MG/2ML
60 INJECTION, POWDER, LYOPHILIZED, FOR SOLUTION INTRAMUSCULAR; INTRAVENOUS ONCE
Status: COMPLETED | OUTPATIENT
Start: 2022-11-30 | End: 2022-11-30

## 2022-11-30 RX ORDER — METHYLPREDNISOLONE SODIUM SUCCINATE 125 MG/2ML
60 INJECTION, POWDER, LYOPHILIZED, FOR SOLUTION INTRAMUSCULAR; INTRAVENOUS ONCE
Status: CANCELLED
Start: 2022-12-01 | End: 2022-12-01

## 2022-11-30 RX ORDER — EPINEPHRINE 1 MG/ML
0.3 INJECTION, SOLUTION, CONCENTRATE INTRAVENOUS
OUTPATIENT
Start: 2022-12-21

## 2022-11-30 RX ADMIN — OMALIZUMAB 300 MG: 150 INJECTION, SOLUTION SUBCUTANEOUS at 10:22

## 2022-11-30 RX ADMIN — DIPHENHYDRAMINE HYDROCHLORIDE 50 MG: 50 INJECTION, SOLUTION INTRAMUSCULAR; INTRAVENOUS at 08:39

## 2022-11-30 RX ADMIN — SODIUM CHLORIDE, SODIUM LACTATE, POTASSIUM CHLORIDE, AND CALCIUM CHLORIDE 1000 ML: 600; 310; 30; 20 INJECTION, SOLUTION INTRAVENOUS at 08:28

## 2022-11-30 RX ADMIN — METHYLPREDNISOLONE SODIUM SUCCINATE 60 MG: 125 INJECTION, POWDER, FOR SOLUTION INTRAMUSCULAR; INTRAVENOUS at 10:17

## 2022-12-01 ENCOUNTER — HOSPITAL ENCOUNTER (OUTPATIENT)
Dept: INFUSION CENTER | Facility: HOSPITAL | Age: 47
Discharge: HOME | End: 2022-12-01

## 2022-12-01 VITALS
OXYGEN SATURATION: 97 % | RESPIRATION RATE: 16 BRPM | HEART RATE: 95 BPM | DIASTOLIC BLOOD PRESSURE: 75 MMHG | SYSTOLIC BLOOD PRESSURE: 110 MMHG | TEMPERATURE: 98.1 F

## 2022-12-01 DIAGNOSIS — E44.1 MILD PROTEIN-CALORIE MALNUTRITION (HCC): ICD-10-CM

## 2022-12-01 DIAGNOSIS — R63.0 ANOREXIA: Primary | ICD-10-CM

## 2022-12-01 DIAGNOSIS — R19.7 DIARRHEA, UNSPECIFIED TYPE: ICD-10-CM

## 2022-12-01 RX ORDER — METHYLPREDNISOLONE SODIUM SUCCINATE 125 MG/2ML
60 INJECTION, POWDER, LYOPHILIZED, FOR SOLUTION INTRAMUSCULAR; INTRAVENOUS ONCE
Status: CANCELLED
Start: 2022-12-02 | End: 2022-12-02

## 2022-12-01 RX ORDER — SODIUM CHLORIDE, SODIUM LACTATE, POTASSIUM CHLORIDE, CALCIUM CHLORIDE 600; 310; 30; 20 MG/100ML; MG/100ML; MG/100ML; MG/100ML
1000 INJECTION, SOLUTION INTRAVENOUS ONCE
Status: CANCELLED
Start: 2022-12-05 | End: 2022-12-02

## 2022-12-01 RX ORDER — SODIUM CHLORIDE, SODIUM LACTATE, POTASSIUM CHLORIDE, CALCIUM CHLORIDE 600; 310; 30; 20 MG/100ML; MG/100ML; MG/100ML; MG/100ML
1000 INJECTION, SOLUTION INTRAVENOUS ONCE
Status: COMPLETED | OUTPATIENT
Start: 2022-12-01 | End: 2022-12-01

## 2022-12-01 RX ORDER — METHYLPREDNISOLONE SODIUM SUCCINATE 125 MG/2ML
60 INJECTION, POWDER, LYOPHILIZED, FOR SOLUTION INTRAMUSCULAR; INTRAVENOUS ONCE
Status: COMPLETED | OUTPATIENT
Start: 2022-12-01 | End: 2022-12-01

## 2022-12-01 RX ADMIN — METHYLPREDNISOLONE SODIUM SUCCINATE 60 MG: 125 INJECTION, POWDER, FOR SOLUTION INTRAMUSCULAR; INTRAVENOUS at 15:11

## 2022-12-01 RX ADMIN — DIPHENHYDRAMINE HYDROCHLORIDE 50 MG: 50 INJECTION, SOLUTION INTRAMUSCULAR; INTRAVENOUS at 13:32

## 2022-12-01 RX ADMIN — SODIUM CHLORIDE, SODIUM LACTATE, POTASSIUM CHLORIDE, AND CALCIUM CHLORIDE 1000 ML: 600; 310; 30; 20 INJECTION, SOLUTION INTRAVENOUS at 13:00

## 2022-12-05 ENCOUNTER — HOSPITAL ENCOUNTER (OUTPATIENT)
Dept: INFUSION CENTER | Facility: HOSPITAL | Age: 47
Discharge: HOME | End: 2022-12-05

## 2022-12-05 ENCOUNTER — OFFICE VISIT (OUTPATIENT)
Dept: FAMILY MEDICINE CLINIC | Facility: CLINIC | Age: 47
End: 2022-12-05

## 2022-12-05 VITALS
BODY MASS INDEX: 31.02 KG/M2 | RESPIRATION RATE: 18 BRPM | OXYGEN SATURATION: 98 % | SYSTOLIC BLOOD PRESSURE: 110 MMHG | DIASTOLIC BLOOD PRESSURE: 75 MMHG | WEIGHT: 158 LBS | HEART RATE: 105 BPM | HEIGHT: 60 IN | TEMPERATURE: 99.3 F

## 2022-12-05 VITALS
DIASTOLIC BLOOD PRESSURE: 75 MMHG | TEMPERATURE: 97.4 F | HEART RATE: 102 BPM | SYSTOLIC BLOOD PRESSURE: 111 MMHG | OXYGEN SATURATION: 99 % | RESPIRATION RATE: 17 BRPM

## 2022-12-05 DIAGNOSIS — E55.9 VITAMIN D DEFICIENCY: ICD-10-CM

## 2022-12-05 DIAGNOSIS — R19.7 DIARRHEA, UNSPECIFIED TYPE: ICD-10-CM

## 2022-12-05 DIAGNOSIS — R63.0 ANOREXIA: Primary | ICD-10-CM

## 2022-12-05 DIAGNOSIS — D89.44 HEREDITARY ALPHA TRYPTASEMIA (HCC): ICD-10-CM

## 2022-12-05 DIAGNOSIS — D89.40 MAST CELL ACTIVATION SYNDROME (HCC): Primary | ICD-10-CM

## 2022-12-05 DIAGNOSIS — E44.1 MILD PROTEIN-CALORIE MALNUTRITION (HCC): ICD-10-CM

## 2022-12-05 RX ORDER — SODIUM CHLORIDE, SODIUM LACTATE, POTASSIUM CHLORIDE, CALCIUM CHLORIDE 600; 310; 30; 20 MG/100ML; MG/100ML; MG/100ML; MG/100ML
1000 INJECTION, SOLUTION INTRAVENOUS ONCE
Status: CANCELLED
Start: 2022-12-07 | End: 2022-12-06

## 2022-12-05 RX ORDER — METHYLPREDNISOLONE SODIUM SUCCINATE 125 MG/2ML
60 INJECTION, POWDER, LYOPHILIZED, FOR SOLUTION INTRAMUSCULAR; INTRAVENOUS ONCE
Status: CANCELLED
Start: 2022-12-07 | End: 2022-12-06

## 2022-12-05 RX ORDER — CALCITRIOL 1 UG/ML
INJECTION INTRAVENOUS
Qty: 1 ML | Status: CANCELLED | OUTPATIENT
Start: 2022-12-05

## 2022-12-05 RX ORDER — SODIUM CHLORIDE, SODIUM LACTATE, POTASSIUM CHLORIDE, CALCIUM CHLORIDE 600; 310; 30; 20 MG/100ML; MG/100ML; MG/100ML; MG/100ML
1000 INJECTION, SOLUTION INTRAVENOUS ONCE
Status: COMPLETED | OUTPATIENT
Start: 2022-12-05 | End: 2022-12-05

## 2022-12-05 RX ORDER — METHYLPREDNISOLONE SODIUM SUCCINATE 125 MG/2ML
60 INJECTION, POWDER, LYOPHILIZED, FOR SOLUTION INTRAMUSCULAR; INTRAVENOUS ONCE
Status: COMPLETED | OUTPATIENT
Start: 2022-12-05 | End: 2022-12-05

## 2022-12-05 RX ADMIN — METHYLPREDNISOLONE SODIUM SUCCINATE 60 MG: 125 INJECTION, POWDER, FOR SOLUTION INTRAMUSCULAR; INTRAVENOUS at 15:19

## 2022-12-05 RX ADMIN — SODIUM CHLORIDE, SODIUM LACTATE, POTASSIUM CHLORIDE, AND CALCIUM CHLORIDE 1000 ML: .6; .31; .03; .02 INJECTION, SOLUTION INTRAVENOUS at 12:35

## 2022-12-05 RX ADMIN — DIPHENHYDRAMINE HYDROCHLORIDE 50 MG: 50 INJECTION, SOLUTION INTRAMUSCULAR; INTRAVENOUS at 12:44

## 2022-12-06 ENCOUNTER — TELEPHONE (OUTPATIENT)
Dept: INFUSION CENTER | Facility: HOSPITAL | Age: 47
End: 2022-12-06

## 2022-12-06 NOTE — PROGRESS NOTES
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[facility] )      Clinically stable at this time  Overall doing well  Will continue current plan and medications recommended by her allergist, gastroenterologist and MCAS specialist  Emergency plan reviewed and I advised patient to carry this with her at all times  Recheck vitamin D level at this time  Will contact the infusion center to see if an IV or IM formulation of Vit D can be administered there  Patient in agreement with current plan  Return in about 3 months (around 3/5/2023) for Next scheduled follow up  Subjective     Patient Id: Madie Muniz is a 50 y o  female    HPI    Patient presents today for follow-up appointment  She has a complex past medical history consistent of MCAS, hereditary alpha tryptasemia, Crohn's disease, multiple allergies including severe red dye and corn allergy  Since her last visit with me in September, she has seen her MCAS specialist at  and [facility] in [place]  During that visit, they constructed a written emergency plan for when the patient has flares so that she may present this to the ER physician  The patient is very happy that she has this established  She has continued with her infusion therapy for hydration as well as her monoclonal antibody drugs for her allergies as well as her Crohn's  This has been managed by her gastroenterologist   She does have some concern with her vitamin-D supplement that she is currently taking  Reports that she has a reaction to it when she takes it and is requesting to have IM or IV administration of vitamin-D if possible  She is otherwise stable and has no further concerns  She was able to travel for pleasure when visiting her doctor in [place] and had a nice trip with family to [place]    Which    Review of Systems   Constitutional: Negative for chills and fever  Respiratory: Negative for cough, choking, shortness of breath and wheezing  Cardiovascular: Negative for chest pain, palpitations and leg swelling  Gastrointestinal: Negative for abdominal pain, constipation, diarrhea, nausea and vomiting  Genitourinary: Negative for dysuria  Musculoskeletal: Negative for arthralgias and myalgias  Neurological: Negative for headaches  Psychiatric/Behavioral: Negative for dysphoric mood and sleep disturbance  All other systems reviewed and are negative  Past Medical History:   Diagnosis Date   • Anemia 2007   • Anxiety    • Asthma    • Bile duct stricture 2014    Sphincter of oddi dysfunction   • Chronic kidney disease    • Colon polyp 1998   • Crohn's colitis ([facility] )    • Deep vein thrombosis ([facility] )    • 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   • Generalized anxiety disorder 08/26/2017   • GERD (gastroesophageal reflux disease) 1995   • GI (gastrointestinal bleed) 1994   • Heart murmur    • Irritable bowel syndrome 2016   • Lactose intolerance 1982   • Mast cell disease    • Migraine    • Myocardial infarction [facility])     NSTEMI  pt denies, documented in cardio note   • Pancreatitis     sphinger of    • Psychiatric disorder    • RA (rheumatoid arthritis) ([facility] )    • Ulcerative colitis ([facility] )        Past Surgical History:   Procedure Laterality Date   • ABDOMINAL SURGERY  2017   • APPENDECTOMY     • CHOLECYSTECTOMY     • COLONOSCOPY  2019   • EGD AND COLONOSCOPY N/A 09/12/2017    Procedure: EGD AND COLONOSCOPY;  Surgeon: Susie Mccain MD;  [facility]: [facility]; Service: Gastroenterology   • ERCP N/A 09/15/2017    Procedure: ENDOSCOPIC RETROGRADE CHOLANGIOPANCREATOGRAPHY (ERCP); Surgeon: Nickie Gutierrez MD;  Location: [facility];   Service: Gastroenterology   • HYSTERECTOMY     • KNEE SURGERY Right    • LAPAROSCOPIC ENDOMETRIOSIS FULGURATION     • ORIF ANKLE FRACTURE Left    • NV KNEE SCOPE,MED/LAT MENISECTOMY Left 05/12/2017 Procedure: POSSIBLE MEDIAL MENISECTOMY;  Surgeon: David Ozuna MD;  Location: [facility];  Service: Orthopedics   • AR KNEE [address] CART Left 05/12/2017    Procedure: KNEE ARTHROSCOPY EVALUATION, CHONDROPLASTY;  Surgeon: David Ozuna MD;  Location: [facility];  Service: Orthopedics   • AR LAP,DIAGNOSTIC ABDOMEN N/A 12/12/2017    Procedure: LAPAROSCOPY DIAGNOSTIC  WITH LYSIS OF ADHESIONS;  Surgeon: Paul Waddell DO;  Location:  MAIN OR;  Service: General   • UPPER GASTROINTESTINAL ENDOSCOPY  2019       Family History   Problem Relation Age of Onset   • Ulcerative colitis Mother    • Arthritis Mother    • Colon polyps Mother    • Heart failure Father    • Neuropathy Father    • Macular degeneration Father    • Diabetes Father    • Early death Father    • Vision loss Father    • Asthma Daughter    • Hypothyroidism Daughter    • Heart disease Maternal Grandmother    • Arthritis Maternal Grandmother    • No Known Problems Maternal Grandfather    • Arthritis Paternal Grandmother    • Macular degeneration Paternal Grandmother    • Vision loss Paternal Grandmother    • Lymphoma Paternal Grandfather    • Cancer Paternal Grandfather    • Early death Paternal Grandfather    • Breast cancer Maternal Aunt    • Cancer Maternal Aunt    • Miscarriages / Stillbirths Maternal Aunt    • Heart failure Paternal Aunt    • Heart failure Paternal Aunt    • Irritable bowel syndrome Paternal [de-identified]    • Breast cancer Paternal Aunt    • Breast cancer additional onset Paternal [de-identified]    • Hypothyroidism Paternal Aunt    • Cancer Paternal Aunt    • No Known Problems Son    • No Known Problems Son    • Kidney disease Brother    • Depression Paternal Uncle    • Early death Paternal Uncle        Allergies   Allergen Reactions   • Amitriptyline Anaphylaxis     According to the patient she had nphylaxis   • Aspergillus Fumigatus Other (See Comments), Hives and Swelling   • Azathioprine Other (See Comments) and Anaphylaxis pancreatitis  According to patient she needed Epipen   • Buspar [Buspirone] Hives and Shortness Of Breath   • Corn Dextrin Anaphylaxis     Any corn based products   • Eggs Or Egg-Derived Products - Food Allergy Anaphylaxis   • Gelatin - Food Allergy Anaphylaxis   • Heparin Hives     Painful welts    • Lactated Ringers Shortness Of Breath     Pt questions this allergy, pt has received LR multiple times without reaction   • Lavender Oil Anaphylaxis     Other reaction(s): anaphalxis   • Malto Dextrin [Dextrin] Anaphylaxis   • Other Anaphylaxis     Gel caps, maltodextrose, dextrose,grapes, lavender    • Penicillium Notatum Shortness Of Breath and Swelling   • Sumatriptan Anaphylaxis     Bradycardia, sob, back pressure, head pain,throat tightness  According to the patient she had anphylaxis   • Contrast [Iodinated Diagnostic Agents] Other (See Comments)     Pt states needs to be premedicated prior to injection   • Corn Oil - Food Allergy - Food Allergy Hives   • Humira [Adalimumab] Other (See Comments)     "I develop drug induced lupus"   • Ibuprofen Hives and Throat Swelling   • Polyethylene Glycol Diarrhea and Vomiting   • Red Dye - Food Allergy Other (See Comments)     intolerance   • Remicade [Infliximab] Other (See Comments)     "I develop drug induced lupus"   • Sulfa Antibiotics Hives and Other (See Comments)   • Sulfate Hives   • Sulfites - Food Allergy Hives   • Chlorhexidine Itching   • Histamine Hives, Rash and Other (See Comments)     Intolerance           Current Outpatient Medications:   •  Alfalfa 650 MG TABS, Take by mouth, Disp: , Rfl:   •  B-D 3CC LUER-SHAMEKA SYR 25GX1" 25G X 1" 3 ML MISC, , Disp: , Rfl:   •  butalbital-acetaminophen-caffeine (FIORICET,ESGIC) -40 mg per tablet, Take 1 tablet by mouth every 8 (eight) hours as needed for migraine, Disp: 20 tablet, Rfl: 0  •  cetirizine (ZyrTEC) 10 mg tablet, Take 20 mg by mouth daily, Disp: , Rfl:   •  cyanocobalamin 1,000 mcg/mL, , Disp: , Rfl:   • diphenhydrAMINE (BENADRYL) 25 mg tablet, Take 50 mg by mouth 2 (two) times a day, Disp: , Rfl:   •  EPINEPHrine (EPIPEN 2-MARYSOL IJ), EpiPen  prn, Disp: , Rfl:   •  furosemide (LASIX) 20 mg tablet, as needed, Disp: , Rfl:   •  hydrOXYzine HCL (ATARAX) 25 mg tablet, Take 25 mg by mouth 4 (four) times a day , Disp: , Rfl:   •  Lactobacillus (PROBIOTIC ACIDOPHILUS PO), Take by mouth, Disp: , Rfl:   •  LORazepam (ATIVAN) 1 mg tablet, Take 1 tablet (1 mg total) by mouth every 8 (eight) hours as needed for anxiety, Disp: 21 tablet, Rfl: 0  •  montelukast (SINGULAIR) 10 mg tablet, Take 1 tablet (10 mg total) by mouth daily at bedtime (Patient taking differently: Take 10 mg by mouth 2 (two) times a day), Disp: 30 tablet, Rfl: 0  •  NON FORMULARY, immunoglobin sq 6g 30 ml once a week, Disp: , Rfl:   •  nystatin (MYCOSTATIN) 500,000 units/5 mL suspension, , Disp: , Rfl:   •  omalizumab (XOLAIR) 150 mg, Inject 300 mg under the skin every 21 days, Disp: , Rfl:   •  ondansetron (ZOFRAN-ODT) 4 mg disintegrating tablet, Take 1 tablet (4 mg total) by mouth every 6 (six) hours as needed for nausea or vomiting, Disp: 20 tablet, Rfl: 0  •  pantoprazole (PROTONIX) 40 mg tablet, Take 1 tablet (40 mg total) by mouth 2 (two) times a day, Disp: 60 tablet, Rfl: 3  •  predniSONE 20 mg tablet, Take as instructed by physician   20mg as needed on days with symptoms, Disp: 100 tablet, Rfl: 0  •  simethicone (MYLICON) 315 MG chewable tablet, Chew 125 mg every 6 (six) hours as needed for flatulence, Disp: , Rfl:   •  Stelara 90 MG/ML subcutaneous injection, 90mg inject subcutaneously every 21 days, Disp: 1 mL, Rfl: 6  •  Synthroid 50 MCG tablet, Taking 1 5 mcg daily, Disp: , Rfl:   •  Synthroid 75 MCG tablet, , Disp: , Rfl:   •  TechLite Lancets MISC, , Disp: , Rfl:   •  diclofenac (VOLTAREN) 75 mg EC tablet, Take 1 tablet (75 mg total) by mouth 2 (two) times a day for 21 days (Patient taking differently: Take 75 mg by mouth as needed), Disp: 42 tablet, Rfl: 2  •  Erenumab-aooe (Aimovig) 140 MG/ML SOAJ, 140 mg (Patient not taking: Reported on 10/5/2022), Disp: , Rfl:   •  ergocalciferol (VITAMIN D2) 50,000 units, , Disp: , Rfl:   •  famotidine (PEPCID) 40 MG tablet, Take 1 tablet (40 mg total) by mouth daily (Patient not taking: Reported on 11/23/2022), Disp: 90 tablet, Rfl: 0  •  levalbuterol (XOPENEX HFA) 45 mcg/act inhaler, Inhale 1-2 puffs every 4 (four) hours as needed for shortness of breath (Patient not taking: Reported on 12/27/2021), Disp: , Rfl:   •  liothyronine (CYTOMEL) 5 mcg tablet, Take 5 mcg by mouth daily (Patient not taking: Reported on 11/23/2022), Disp: , Rfl:   •  Riboflavin (Vitamin B-2) 100 MG TABS, Take 4 tablets (400 mg total) by mouth daily, Disp: 360 tablet, Rfl: 3  No current facility-administered medications for this visit  Objective     Vitals:    12/05/22 1611   BP: 110/75   Pulse: 105   Resp: 18   Temp: 99 3 °F (37 4 °C)   SpO2: 98%   Weight: 71 7 kg (158 lb)   Height: 5' (1 524 m)       Physical Exam  Vitals and nursing note reviewed  Constitutional:       General: She is not in acute distress  Appearance: Normal appearance  She is well-developed and well-nourished  She is not ill-appearing or toxic-appearing  HENT:      Head: Normocephalic and atraumatic  Eyes:      Extraocular Movements: Extraocular movements intact and EOM normal    Neck:      Thyroid: No thyromegaly  Cardiovascular:      Rate and Rhythm: Normal rate and regular rhythm  Heart sounds: Normal heart sounds  No murmur heard  No gallop  Pulmonary:      Effort: Pulmonary effort is normal  No respiratory distress  Breath sounds: Normal breath sounds  No wheezing, rhonchi or rales  Genitourinary:     Comments: Exam deferred  Musculoskeletal:         General: No edema  Cervical back: Neck supple  Right lower leg: No edema  Left lower leg: No edema  Skin:     General: Skin is warm     Neurological:      General: No focal deficit present  Mental Status: She is alert and oriented to person, place, and time  Mental status is at baseline  Psychiatric:         Mood and Affect: Mood and affect and mood normal          Behavior: Behavior normal          Thought Content:  Thought content normal          Judgment: Judgment normal          Kelton Lema

## 2022-12-06 NOTE — TELEPHONE ENCOUNTER
[phone] with patient and let her know I spoke with Anuradha Miranda, Pharmacist regarding possible Vitamin D injections  Hecterol and Zemplar two products and I gave info to Dr Stacey Morris   He will let us know if either is appropriate for the patient and if will be given in office or infusion center

## 2022-12-07 ENCOUNTER — HOSPITAL ENCOUNTER (OUTPATIENT)
Dept: INFUSION CENTER | Facility: HOSPITAL | Age: 47
Discharge: HOME | End: 2022-12-07

## 2022-12-07 VITALS
RESPIRATION RATE: 17 BRPM | HEART RATE: 107 BPM | TEMPERATURE: 97.6 F | OXYGEN SATURATION: 98 % | DIASTOLIC BLOOD PRESSURE: 78 MMHG | SYSTOLIC BLOOD PRESSURE: 127 MMHG

## 2022-12-07 DIAGNOSIS — R63.0 ANOREXIA: Primary | ICD-10-CM

## 2022-12-07 DIAGNOSIS — R19.7 DIARRHEA, UNSPECIFIED TYPE: ICD-10-CM

## 2022-12-07 DIAGNOSIS — E44.1 MILD PROTEIN-CALORIE MALNUTRITION (HCC): ICD-10-CM

## 2022-12-07 RX ORDER — SODIUM CHLORIDE, SODIUM LACTATE, POTASSIUM CHLORIDE, CALCIUM CHLORIDE 600; 310; 30; 20 MG/100ML; MG/100ML; MG/100ML; MG/100ML
1000 INJECTION, SOLUTION INTRAVENOUS ONCE
Status: CANCELLED
Start: 2022-12-11 | End: 2022-12-08

## 2022-12-07 RX ORDER — METHYLPREDNISOLONE SODIUM SUCCINATE 125 MG/2ML
60 INJECTION, POWDER, LYOPHILIZED, FOR SOLUTION INTRAMUSCULAR; INTRAVENOUS ONCE
Status: COMPLETED | OUTPATIENT
Start: 2022-12-07 | End: 2022-12-07

## 2022-12-07 RX ORDER — SODIUM CHLORIDE, SODIUM LACTATE, POTASSIUM CHLORIDE, CALCIUM CHLORIDE 600; 310; 30; 20 MG/100ML; MG/100ML; MG/100ML; MG/100ML
1000 INJECTION, SOLUTION INTRAVENOUS ONCE
Status: COMPLETED | OUTPATIENT
Start: 2022-12-07 | End: 2022-12-07

## 2022-12-07 RX ORDER — METHYLPREDNISOLONE SODIUM SUCCINATE 125 MG/2ML
60 INJECTION, POWDER, LYOPHILIZED, FOR SOLUTION INTRAMUSCULAR; INTRAVENOUS ONCE
Status: CANCELLED
Start: 2022-12-09 | End: 2022-12-08

## 2022-12-07 RX ADMIN — DIPHENHYDRAMINE HYDROCHLORIDE 50 MG: 50 INJECTION, SOLUTION INTRAMUSCULAR; INTRAVENOUS at 14:00

## 2022-12-07 RX ADMIN — SODIUM CHLORIDE, SODIUM LACTATE, POTASSIUM CHLORIDE, AND CALCIUM CHLORIDE 1000 ML: .6; .31; .03; .02 INJECTION, SOLUTION INTRAVENOUS at 13:15

## 2022-12-07 RX ADMIN — METHYLPREDNISOLONE SODIUM SUCCINATE 60 MG: 125 INJECTION, POWDER, FOR SOLUTION INTRAMUSCULAR; INTRAVENOUS at 15:47

## 2022-12-08 ENCOUNTER — TELEPHONE (OUTPATIENT)
Dept: GASTROENTEROLOGY | Facility: CLINIC | Age: 47
End: 2022-12-08

## 2022-12-08 DIAGNOSIS — G43.809 OTHER MIGRAINE WITHOUT STATUS MIGRAINOSUS, NOT INTRACTABLE: ICD-10-CM

## 2022-12-08 RX ORDER — BUTALBITAL, ACETAMINOPHEN AND CAFFEINE 50; 325; 40 MG/1; MG/1; MG/1
1 TABLET ORAL EVERY 8 HOURS PRN
Qty: 20 TABLET | Refills: 0 | Status: SHIPPED | OUTPATIENT
Start: 2022-12-08

## 2022-12-08 RX ORDER — BUTALBITAL, ACETAMINOPHEN AND CAFFEINE 50; 325; 40 MG/1; MG/1; MG/1
1 TABLET ORAL EVERY 8 HOURS PRN
Qty: 20 TABLET | Refills: 0 | Status: SHIPPED | OUTPATIENT
Start: 2022-12-08 | End: 2022-12-08 | Stop reason: SDUPTHER

## 2022-12-08 NOTE — TELEPHONE ENCOUNTER
Dr Erma Carvajal,  Pt called and would like to follow up with you but asked if it would be possible for you to reach out to her by phone  Regarding  > her hydration treatment  > update on her PCP visit - she states they cannot find any Vitamin D oral that works - any ideas on Injection/Infusion she would like to know  >your recommendation for the doctor in [place]     Thank you

## 2022-12-09 ENCOUNTER — TELEPHONE (OUTPATIENT)
Dept: GASTROENTEROLOGY | Facility: CLINIC | Age: 47
End: 2022-12-09

## 2022-12-09 NOTE — TELEPHONE ENCOUNTER
Appointment Request From: Alexander Nelson      With Provider: Andrez Rolle MD [[facility]]      Preferred Date Range: Any      Preferred Times: Any Time      Reason for visit: Gastroenterology Office Visit      Comments:   PLEASE LET DR BIGGS KNOW I NEED TO CANCEL TODA[address] AT 12, AS AN EMERGENCY HAS COME UP AND DEP IS ON THERE WAY TO MY HOUSE TO ADDRESS IT  JUST PUT ME IN SOMETIME NEXT WEEK   THANK YOU, [address]

## 2022-12-12 ENCOUNTER — HOSPITAL ENCOUNTER (OUTPATIENT)
Dept: INFUSION CENTER | Facility: HOSPITAL | Age: 47
Discharge: HOME | End: 2022-12-12

## 2022-12-12 VITALS
HEART RATE: 98 BPM | TEMPERATURE: 97.8 F | SYSTOLIC BLOOD PRESSURE: 122 MMHG | RESPIRATION RATE: 18 BRPM | DIASTOLIC BLOOD PRESSURE: 87 MMHG | OXYGEN SATURATION: 98 %

## 2022-12-12 DIAGNOSIS — R19.7 DIARRHEA, UNSPECIFIED TYPE: ICD-10-CM

## 2022-12-12 DIAGNOSIS — E44.1 MILD PROTEIN-CALORIE MALNUTRITION (HCC): ICD-10-CM

## 2022-12-12 DIAGNOSIS — R63.0 ANOREXIA: Primary | ICD-10-CM

## 2022-12-12 RX ORDER — SODIUM CHLORIDE, SODIUM LACTATE, POTASSIUM CHLORIDE, CALCIUM CHLORIDE 600; 310; 30; 20 MG/100ML; MG/100ML; MG/100ML; MG/100ML
1000 INJECTION, SOLUTION INTRAVENOUS ONCE
Status: CANCELLED
Start: 2022-12-14 | End: 2022-12-13

## 2022-12-12 RX ORDER — METHYLPREDNISOLONE SODIUM SUCCINATE 125 MG/2ML
60 INJECTION, POWDER, LYOPHILIZED, FOR SOLUTION INTRAMUSCULAR; INTRAVENOUS ONCE
Status: COMPLETED | OUTPATIENT
Start: 2022-12-12 | End: 2022-12-12

## 2022-12-12 RX ORDER — METHYLPREDNISOLONE SODIUM SUCCINATE 125 MG/2ML
60 INJECTION, POWDER, LYOPHILIZED, FOR SOLUTION INTRAMUSCULAR; INTRAVENOUS ONCE
Status: CANCELLED
Start: 2022-12-14 | End: 2022-12-13

## 2022-12-12 RX ORDER — EPINEPHRINE 1 MG/ML
0.3 INJECTION, SOLUTION, CONCENTRATE INTRAVENOUS
Status: CANCELLED | OUTPATIENT
Start: 2022-12-21

## 2022-12-12 RX ORDER — SODIUM CHLORIDE, SODIUM LACTATE, POTASSIUM CHLORIDE, CALCIUM CHLORIDE 600; 310; 30; 20 MG/100ML; MG/100ML; MG/100ML; MG/100ML
1000 INJECTION, SOLUTION INTRAVENOUS ONCE
Status: COMPLETED | OUTPATIENT
Start: 2022-12-12 | End: 2022-12-12

## 2022-12-12 RX ADMIN — SODIUM CHLORIDE, SODIUM LACTATE, POTASSIUM CHLORIDE, AND CALCIUM CHLORIDE 1000 ML: .6; .31; .03; .02 INJECTION, SOLUTION INTRAVENOUS at 13:57

## 2022-12-12 RX ADMIN — DIPHENHYDRAMINE HYDROCHLORIDE 50 MG: 50 INJECTION, SOLUTION INTRAMUSCULAR; INTRAVENOUS at 14:43

## 2022-12-12 RX ADMIN — METHYLPREDNISOLONE SODIUM SUCCINATE 60 MG: 125 INJECTION, POWDER, FOR SOLUTION INTRAMUSCULAR; INTRAVENOUS at 16:31

## 2022-12-14 ENCOUNTER — TELEPHONE (OUTPATIENT)
Dept: UROLOGY | Facility: AMBULATORY SURGERY CENTER | Age: 47
End: 2022-12-14

## 2022-12-14 ENCOUNTER — HOSPITAL ENCOUNTER (OUTPATIENT)
Dept: INFUSION CENTER | Facility: HOSPITAL | Age: 47
Discharge: HOME | End: 2022-12-14

## 2022-12-14 VITALS
TEMPERATURE: 98.1 F | OXYGEN SATURATION: 97 % | HEART RATE: 97 BPM | SYSTOLIC BLOOD PRESSURE: 122 MMHG | DIASTOLIC BLOOD PRESSURE: 84 MMHG | RESPIRATION RATE: 18 BRPM

## 2022-12-14 DIAGNOSIS — E44.1 MILD PROTEIN-CALORIE MALNUTRITION (HCC): ICD-10-CM

## 2022-12-14 DIAGNOSIS — R63.0 ANOREXIA: Primary | ICD-10-CM

## 2022-12-14 DIAGNOSIS — R19.7 DIARRHEA, UNSPECIFIED TYPE: ICD-10-CM

## 2022-12-14 RX ORDER — METHYLPREDNISOLONE SODIUM SUCCINATE 125 MG/2ML
60 INJECTION, POWDER, LYOPHILIZED, FOR SOLUTION INTRAMUSCULAR; INTRAVENOUS ONCE
Status: COMPLETED | OUTPATIENT
Start: 2022-12-14 | End: 2022-12-14

## 2022-12-14 RX ORDER — SODIUM CHLORIDE, SODIUM LACTATE, POTASSIUM CHLORIDE, CALCIUM CHLORIDE 600; 310; 30; 20 MG/100ML; MG/100ML; MG/100ML; MG/100ML
1000 INJECTION, SOLUTION INTRAVENOUS ONCE
Status: COMPLETED | OUTPATIENT
Start: 2022-12-14 | End: 2022-12-14

## 2022-12-14 RX ORDER — SODIUM CHLORIDE, SODIUM LACTATE, POTASSIUM CHLORIDE, CALCIUM CHLORIDE 600; 310; 30; 20 MG/100ML; MG/100ML; MG/100ML; MG/100ML
1000 INJECTION, SOLUTION INTRAVENOUS ONCE
Status: CANCELLED
Start: 2022-12-15 | End: 2022-12-15

## 2022-12-14 RX ORDER — EPINEPHRINE 1 MG/ML
0.3 INJECTION, SOLUTION, CONCENTRATE INTRAVENOUS
Status: CANCELLED | OUTPATIENT
Start: 2022-12-21

## 2022-12-14 RX ORDER — METHYLPREDNISOLONE SODIUM SUCCINATE 125 MG/2ML
60 INJECTION, POWDER, LYOPHILIZED, FOR SOLUTION INTRAMUSCULAR; INTRAVENOUS ONCE
Status: CANCELLED
Start: 2022-12-15 | End: 2022-12-15

## 2022-12-14 RX ADMIN — SODIUM CHLORIDE, SODIUM LACTATE, POTASSIUM CHLORIDE, AND CALCIUM CHLORIDE 1000 ML: .6; .31; .03; .02 INJECTION, SOLUTION INTRAVENOUS at 11:46

## 2022-12-14 RX ADMIN — METHYLPREDNISOLONE SODIUM SUCCINATE 60 MG: 125 INJECTION, POWDER, FOR SOLUTION INTRAMUSCULAR; INTRAVENOUS at 14:35

## 2022-12-14 RX ADMIN — DIPHENHYDRAMINE HYDROCHLORIDE 50 MG: 50 INJECTION, SOLUTION INTRAMUSCULAR; INTRAVENOUS at 12:39

## 2022-12-14 NOTE — TELEPHONE ENCOUNTER
Pt under care of Dr Agnes Henderson     Pt called and stated she has mass cell activation disorder and her PCP suggested that she have her cysto procedure in hospital setting due to pt needing to be premedicated so she does not go into anaphylactic shock Pt would prefer [facility] location if possible    Pt call [phone]

## 2022-12-14 NOTE — TELEPHONE ENCOUNTER
LVM for pt to call office back regarding message below:  Juan Blair MD  You 1 hour ago (11:46 AM)     Patient needs to get her CT renal protocol and then she can return to an AP who can schedule cystoscopy in the hospital   I do not go to my nurse but [facility] provider does can certainly do her cystoscopy

## 2022-12-16 ENCOUNTER — HOSPITAL ENCOUNTER (OUTPATIENT)
Dept: INFUSION CENTER | Facility: HOSPITAL | Age: 47
End: 2022-12-16

## 2022-12-16 VITALS
DIASTOLIC BLOOD PRESSURE: 82 MMHG | OXYGEN SATURATION: 97 % | TEMPERATURE: 98.2 F | RESPIRATION RATE: 16 BRPM | HEART RATE: 108 BPM | SYSTOLIC BLOOD PRESSURE: 159 MMHG

## 2022-12-16 DIAGNOSIS — R19.7 DIARRHEA, UNSPECIFIED TYPE: ICD-10-CM

## 2022-12-16 DIAGNOSIS — R63.0 ANOREXIA: Primary | ICD-10-CM

## 2022-12-16 DIAGNOSIS — E44.1 MILD PROTEIN-CALORIE MALNUTRITION (HCC): ICD-10-CM

## 2022-12-16 RX ORDER — SODIUM CHLORIDE, SODIUM LACTATE, POTASSIUM CHLORIDE, CALCIUM CHLORIDE 600; 310; 30; 20 MG/100ML; MG/100ML; MG/100ML; MG/100ML
1000 INJECTION, SOLUTION INTRAVENOUS ONCE
Status: CANCELLED
Start: 2022-12-19 | End: 2022-12-17

## 2022-12-16 RX ORDER — METHYLPREDNISOLONE SODIUM SUCCINATE 125 MG/2ML
60 INJECTION, POWDER, LYOPHILIZED, FOR SOLUTION INTRAMUSCULAR; INTRAVENOUS ONCE
Status: COMPLETED | OUTPATIENT
Start: 2022-12-16 | End: 2022-12-16

## 2022-12-16 RX ORDER — SODIUM CHLORIDE, SODIUM LACTATE, POTASSIUM CHLORIDE, CALCIUM CHLORIDE 600; 310; 30; 20 MG/100ML; MG/100ML; MG/100ML; MG/100ML
1000 INJECTION, SOLUTION INTRAVENOUS ONCE
Status: COMPLETED | OUTPATIENT
Start: 2022-12-16 | End: 2022-12-16

## 2022-12-16 RX ORDER — METHYLPREDNISOLONE SODIUM SUCCINATE 125 MG/2ML
60 INJECTION, POWDER, LYOPHILIZED, FOR SOLUTION INTRAMUSCULAR; INTRAVENOUS ONCE
Status: CANCELLED
Start: 2022-12-19 | End: 2022-12-17

## 2022-12-16 RX ADMIN — SODIUM CHLORIDE, SODIUM LACTATE, POTASSIUM CHLORIDE, AND CALCIUM CHLORIDE 1000 ML: .6; .31; .03; .02 INJECTION, SOLUTION INTRAVENOUS at 08:30

## 2022-12-16 RX ADMIN — DIPHENHYDRAMINE HYDROCHLORIDE 50 MG: 50 INJECTION, SOLUTION INTRAMUSCULAR; INTRAVENOUS at 10:36

## 2022-12-16 RX ADMIN — METHYLPREDNISOLONE SODIUM SUCCINATE 60 MG: 125 INJECTION, POWDER, FOR SOLUTION INTRAMUSCULAR; INTRAVENOUS at 11:07

## 2022-12-16 NOTE — TELEPHONE ENCOUNTER
Will forward to Dr Sol Mccullough  I don't believe he goes to [facility] for any procedures   HE will determine if cysto needs to be performed under sedation in the OR

## 2022-12-19 ENCOUNTER — HOSPITAL ENCOUNTER (OUTPATIENT)
Dept: INFUSION CENTER | Facility: HOSPITAL | Age: 47
Discharge: HOME | End: 2022-12-19

## 2022-12-19 DIAGNOSIS — R63.0 ANOREXIA: Primary | ICD-10-CM

## 2022-12-19 DIAGNOSIS — E44.1 MILD PROTEIN-CALORIE MALNUTRITION (HCC): ICD-10-CM

## 2022-12-19 DIAGNOSIS — R19.7 DIARRHEA, UNSPECIFIED TYPE: ICD-10-CM

## 2022-12-19 RX ORDER — METHYLPREDNISOLONE SODIUM SUCCINATE 125 MG/2ML
60 INJECTION, POWDER, LYOPHILIZED, FOR SOLUTION INTRAMUSCULAR; INTRAVENOUS ONCE
Status: CANCELLED
Start: 2022-12-20 | End: 2022-12-20

## 2022-12-19 RX ORDER — METHYLPREDNISOLONE SODIUM SUCCINATE 125 MG/2ML
60 INJECTION, POWDER, LYOPHILIZED, FOR SOLUTION INTRAMUSCULAR; INTRAVENOUS ONCE
Status: COMPLETED | OUTPATIENT
Start: 2022-12-19 | End: 2022-12-19

## 2022-12-19 RX ORDER — SODIUM CHLORIDE, SODIUM LACTATE, POTASSIUM CHLORIDE, CALCIUM CHLORIDE 600; 310; 30; 20 MG/100ML; MG/100ML; MG/100ML; MG/100ML
1000 INJECTION, SOLUTION INTRAVENOUS ONCE
Status: COMPLETED | OUTPATIENT
Start: 2022-12-19 | End: 2022-12-19

## 2022-12-19 RX ORDER — SODIUM CHLORIDE, SODIUM LACTATE, POTASSIUM CHLORIDE, CALCIUM CHLORIDE 600; 310; 30; 20 MG/100ML; MG/100ML; MG/100ML; MG/100ML
1000 INJECTION, SOLUTION INTRAVENOUS ONCE
Status: CANCELLED
Start: 2022-12-20 | End: 2022-12-20

## 2022-12-19 RX ADMIN — METHYLPREDNISOLONE SODIUM SUCCINATE 60 MG: 125 INJECTION, POWDER, FOR SOLUTION INTRAMUSCULAR; INTRAVENOUS at 15:15

## 2022-12-19 RX ADMIN — DIPHENHYDRAMINE HYDROCHLORIDE 50 MG: 50 INJECTION, SOLUTION INTRAMUSCULAR; INTRAVENOUS at 13:13

## 2022-12-19 RX ADMIN — SODIUM CHLORIDE, SODIUM LACTATE, POTASSIUM CHLORIDE, AND CALCIUM CHLORIDE 1000 ML: .6; .31; .03; .02 INJECTION, SOLUTION INTRAVENOUS at 13:00

## 2022-12-21 ENCOUNTER — HOSPITAL ENCOUNTER (OUTPATIENT)
Dept: INFUSION CENTER | Facility: HOSPITAL | Age: 47
Discharge: HOME | End: 2022-12-21

## 2022-12-21 VITALS
TEMPERATURE: 96.7 F | SYSTOLIC BLOOD PRESSURE: 114 MMHG | RESPIRATION RATE: 16 BRPM | OXYGEN SATURATION: 97 % | DIASTOLIC BLOOD PRESSURE: 75 MMHG | HEART RATE: 105 BPM

## 2022-12-21 DIAGNOSIS — E44.1 MILD PROTEIN-CALORIE MALNUTRITION (HCC): ICD-10-CM

## 2022-12-21 DIAGNOSIS — L50.1 CHRONIC IDIOPATHIC URTICARIA: ICD-10-CM

## 2022-12-21 DIAGNOSIS — R19.7 DIARRHEA, UNSPECIFIED TYPE: ICD-10-CM

## 2022-12-21 DIAGNOSIS — R63.0 ANOREXIA: Primary | ICD-10-CM

## 2022-12-21 RX ORDER — EPINEPHRINE 1 MG/ML
0.3 INJECTION, SOLUTION, CONCENTRATE INTRAVENOUS
Status: DISCONTINUED | OUTPATIENT
Start: 2022-12-21 | End: 2022-12-24 | Stop reason: HOSPADM

## 2022-12-21 RX ORDER — SODIUM CHLORIDE, SODIUM LACTATE, POTASSIUM CHLORIDE, CALCIUM CHLORIDE 600; 310; 30; 20 MG/100ML; MG/100ML; MG/100ML; MG/100ML
1000 INJECTION, SOLUTION INTRAVENOUS ONCE
Status: COMPLETED | OUTPATIENT
Start: 2022-12-21 | End: 2022-12-21

## 2022-12-21 RX ORDER — SODIUM CHLORIDE, SODIUM LACTATE, POTASSIUM CHLORIDE, CALCIUM CHLORIDE 600; 310; 30; 20 MG/100ML; MG/100ML; MG/100ML; MG/100ML
1000 INJECTION, SOLUTION INTRAVENOUS ONCE
Status: CANCELLED
Start: 2022-12-23 | End: 2022-12-22

## 2022-12-21 RX ORDER — METHYLPREDNISOLONE SODIUM SUCCINATE 125 MG/2ML
60 INJECTION, POWDER, LYOPHILIZED, FOR SOLUTION INTRAMUSCULAR; INTRAVENOUS ONCE
Status: COMPLETED | OUTPATIENT
Start: 2022-12-21 | End: 2022-12-21

## 2022-12-21 RX ORDER — METHYLPREDNISOLONE SODIUM SUCCINATE 125 MG/2ML
60 INJECTION, POWDER, LYOPHILIZED, FOR SOLUTION INTRAMUSCULAR; INTRAVENOUS ONCE
Status: CANCELLED
Start: 2022-12-23 | End: 2022-12-22

## 2022-12-21 RX ORDER — EPINEPHRINE 1 MG/ML
0.3 INJECTION, SOLUTION, CONCENTRATE INTRAVENOUS
OUTPATIENT
Start: 2023-01-11

## 2022-12-21 RX ADMIN — DIPHENHYDRAMINE HYDROCHLORIDE 50 MG: 50 INJECTION, SOLUTION INTRAMUSCULAR; INTRAVENOUS at 14:26

## 2022-12-21 RX ADMIN — OMALIZUMAB 300 MG: 150 INJECTION, SOLUTION SUBCUTANEOUS at 15:59

## 2022-12-21 RX ADMIN — SODIUM CHLORIDE, SODIUM LACTATE, POTASSIUM CHLORIDE, AND CALCIUM CHLORIDE 1000 ML: .6; .31; .03; .02 INJECTION, SOLUTION INTRAVENOUS at 12:50

## 2022-12-21 RX ADMIN — METHYLPREDNISOLONE SODIUM SUCCINATE 60 MG: 125 INJECTION, POWDER, FOR SOLUTION INTRAMUSCULAR; INTRAVENOUS at 15:42

## 2022-12-23 ENCOUNTER — HOSPITAL ENCOUNTER (OUTPATIENT)
Dept: INFUSION CENTER | Facility: HOSPITAL | Age: 47
End: 2022-12-23
Attending: INTERNAL MEDICINE

## 2022-12-23 VITALS
OXYGEN SATURATION: 97 % | SYSTOLIC BLOOD PRESSURE: 134 MMHG | DIASTOLIC BLOOD PRESSURE: 83 MMHG | HEART RATE: 95 BPM | TEMPERATURE: 98.2 F | RESPIRATION RATE: 16 BRPM

## 2022-12-23 DIAGNOSIS — E44.1 MILD PROTEIN-CALORIE MALNUTRITION (HCC): ICD-10-CM

## 2022-12-23 DIAGNOSIS — R19.7 DIARRHEA, UNSPECIFIED TYPE: ICD-10-CM

## 2022-12-23 DIAGNOSIS — R63.0 ANOREXIA: Primary | ICD-10-CM

## 2022-12-23 RX ORDER — METHYLPREDNISOLONE SODIUM SUCCINATE 125 MG/2ML
60 INJECTION, POWDER, LYOPHILIZED, FOR SOLUTION INTRAMUSCULAR; INTRAVENOUS ONCE
Status: COMPLETED | OUTPATIENT
Start: 2022-12-23 | End: 2022-12-23

## 2022-12-23 RX ORDER — METHYLPREDNISOLONE SODIUM SUCCINATE 125 MG/2ML
60 INJECTION, POWDER, LYOPHILIZED, FOR SOLUTION INTRAMUSCULAR; INTRAVENOUS ONCE
Status: CANCELLED
Start: 2022-12-28 | End: 2022-12-24

## 2022-12-23 RX ADMIN — METHYLPREDNISOLONE SODIUM SUCCINATE 60 MG: 125 INJECTION, POWDER, FOR SOLUTION INTRAMUSCULAR; INTRAVENOUS at 14:03

## 2022-12-23 RX ADMIN — SODIUM CHLORIDE 1000 ML: 0.9 INJECTION, SOLUTION INTRAVENOUS at 11:34

## 2022-12-23 RX ADMIN — DIPHENHYDRAMINE HYDROCHLORIDE 50 MG: 50 INJECTION, SOLUTION INTRAMUSCULAR; INTRAVENOUS at 11:42

## 2022-12-27 ENCOUNTER — TELEPHONE (OUTPATIENT)
Dept: INFUSION CENTER | Facility: HOSPITAL | Age: 47
End: 2022-12-27

## 2022-12-27 NOTE — TELEPHONE ENCOUNTER
Patient NO show for apt today, called and patient stated she did not know she was scheduled for this apt, patient doesn't know how scheduled this apt    Reminded patient she has an apt tomorrow at [facility]

## 2022-12-28 ENCOUNTER — HOSPITAL ENCOUNTER (OUTPATIENT)
Dept: INFUSION CENTER | Facility: HOSPITAL | Age: 47
Discharge: HOME | End: 2022-12-28

## 2022-12-28 VITALS
TEMPERATURE: 96.6 F | RESPIRATION RATE: 18 BRPM | DIASTOLIC BLOOD PRESSURE: 80 MMHG | OXYGEN SATURATION: 98 % | HEART RATE: 110 BPM | SYSTOLIC BLOOD PRESSURE: 133 MMHG

## 2022-12-28 DIAGNOSIS — R19.7 DIARRHEA, UNSPECIFIED TYPE: ICD-10-CM

## 2022-12-28 DIAGNOSIS — E44.1 MILD PROTEIN-CALORIE MALNUTRITION (HCC): ICD-10-CM

## 2022-12-28 DIAGNOSIS — R63.0 ANOREXIA: Primary | ICD-10-CM

## 2022-12-28 RX ORDER — METHYLPREDNISOLONE SODIUM SUCCINATE 125 MG/2ML
60 INJECTION, POWDER, LYOPHILIZED, FOR SOLUTION INTRAMUSCULAR; INTRAVENOUS ONCE
Status: CANCELLED
Start: 2022-12-30 | End: 2022-12-29

## 2022-12-28 RX ORDER — METHYLPREDNISOLONE SODIUM SUCCINATE 125 MG/2ML
60 INJECTION, POWDER, LYOPHILIZED, FOR SOLUTION INTRAMUSCULAR; INTRAVENOUS ONCE
Status: COMPLETED | OUTPATIENT
Start: 2022-12-28 | End: 2022-12-28

## 2022-12-28 RX ADMIN — METHYLPREDNISOLONE SODIUM SUCCINATE 60 MG: 125 INJECTION, POWDER, FOR SOLUTION INTRAMUSCULAR; INTRAVENOUS at 11:01

## 2022-12-28 RX ADMIN — SODIUM CHLORIDE 1000 ML: 0.9 INJECTION, SOLUTION INTRAVENOUS at 08:20

## 2022-12-28 RX ADMIN — DIPHENHYDRAMINE HYDROCHLORIDE 50 MG: 50 INJECTION, SOLUTION INTRAMUSCULAR; INTRAVENOUS at 08:44

## 2022-12-30 ENCOUNTER — HOSPITAL ENCOUNTER (OUTPATIENT)
Dept: INFUSION CENTER | Facility: HOSPITAL | Age: 47
End: 2022-12-30

## 2022-12-30 VITALS
TEMPERATURE: 96.4 F | HEART RATE: 93 BPM | SYSTOLIC BLOOD PRESSURE: 139 MMHG | OXYGEN SATURATION: 100 % | RESPIRATION RATE: 17 BRPM | DIASTOLIC BLOOD PRESSURE: 80 MMHG

## 2022-12-30 DIAGNOSIS — K52.9 INFLAMMATORY BOWEL DISEASE: ICD-10-CM

## 2022-12-30 DIAGNOSIS — R19.7 DIARRHEA, UNSPECIFIED TYPE: ICD-10-CM

## 2022-12-30 DIAGNOSIS — R63.0 ANOREXIA: Primary | ICD-10-CM

## 2022-12-30 DIAGNOSIS — E44.1 MILD PROTEIN-CALORIE MALNUTRITION (HCC): ICD-10-CM

## 2022-12-30 RX ORDER — PANTOPRAZOLE SODIUM 40 MG/1
TABLET, DELAYED RELEASE ORAL
Qty: 60 TABLET | Refills: 5 | Status: SHIPPED | OUTPATIENT
Start: 2022-12-30

## 2022-12-30 RX ORDER — METHYLPREDNISOLONE SODIUM SUCCINATE 125 MG/2ML
60 INJECTION, POWDER, LYOPHILIZED, FOR SOLUTION INTRAMUSCULAR; INTRAVENOUS ONCE
Status: CANCELLED
Start: 2022-12-31 | End: 2022-12-31

## 2022-12-30 RX ORDER — METHYLPREDNISOLONE SODIUM SUCCINATE 125 MG/2ML
60 INJECTION, POWDER, LYOPHILIZED, FOR SOLUTION INTRAMUSCULAR; INTRAVENOUS ONCE
Status: DISCONTINUED | OUTPATIENT
Start: 2022-12-30 | End: 2023-01-02 | Stop reason: HOSPADM

## 2022-12-30 RX ADMIN — SODIUM CHLORIDE 1000 ML: 0.9 INJECTION, SOLUTION INTRAVENOUS at 08:50

## 2022-12-30 NOTE — PROGRESS NOTES
Pt declined Benadryl and Solumedrol today  Tolerated infusion well  Discharged in satisfactory condition

## 2023-01-03 ENCOUNTER — PREP FOR PROCEDURE (OUTPATIENT)
Dept: GASTROENTEROLOGY | Facility: CLINIC | Age: 48
End: 2023-01-03

## 2023-01-03 ENCOUNTER — HOSPITAL ENCOUNTER (OUTPATIENT)
Dept: INFUSION CENTER | Facility: HOSPITAL | Age: 48
Discharge: HOME | End: 2023-01-03

## 2023-01-03 ENCOUNTER — TELEPHONE (OUTPATIENT)
Dept: GASTROENTEROLOGY | Facility: CLINIC | Age: 48
End: 2023-01-03

## 2023-01-03 VITALS
RESPIRATION RATE: 18 BRPM | SYSTOLIC BLOOD PRESSURE: 113 MMHG | OXYGEN SATURATION: 97 % | HEART RATE: 111 BPM | TEMPERATURE: 97.9 F | DIASTOLIC BLOOD PRESSURE: 71 MMHG

## 2023-01-03 DIAGNOSIS — R63.0 ANOREXIA: Primary | ICD-10-CM

## 2023-01-03 DIAGNOSIS — R19.7 DIARRHEA, UNSPECIFIED TYPE: ICD-10-CM

## 2023-01-03 DIAGNOSIS — K50.119 CROHN'S DISEASE OF COLON WITH COMPLICATION (HCC): Primary | ICD-10-CM

## 2023-01-03 DIAGNOSIS — E44.1 MILD PROTEIN-CALORIE MALNUTRITION (HCC): ICD-10-CM

## 2023-01-03 DIAGNOSIS — R10.13 DYSPEPSIA: ICD-10-CM

## 2023-01-03 RX ORDER — METHYLPREDNISOLONE SODIUM SUCCINATE 125 MG/2ML
60 INJECTION, POWDER, LYOPHILIZED, FOR SOLUTION INTRAMUSCULAR; INTRAVENOUS ONCE
Status: CANCELLED
Start: 2023-01-04 | End: 2023-01-04

## 2023-01-03 RX ORDER — METHYLPREDNISOLONE SODIUM SUCCINATE 125 MG/2ML
60 INJECTION, POWDER, LYOPHILIZED, FOR SOLUTION INTRAMUSCULAR; INTRAVENOUS ONCE
Status: DISCONTINUED | OUTPATIENT
Start: 2023-01-03 | End: 2023-01-06 | Stop reason: HOSPADM

## 2023-01-03 RX ADMIN — SODIUM CHLORIDE 1000 ML: 0.9 INJECTION, SOLUTION INTRAVENOUS at 11:25

## 2023-01-03 NOTE — TELEPHONE ENCOUNTER
----- Message from Ronnie Guzman sent at 1/3/2023  9:22 AM EST -----  Regarding: FW: Krystal Duane  Contact: [phone]    ----- Message -----  From: Yuliet Jane  Sent: 1/1/2023   5:45 PM EST  To: [facility]  Subject: Krystal Duane                                       Good morning and Happy New Year! I hope everyone at Dr Ochoa Cheema office enjoyed the holiday  I would like to see if Dr Marylou Neves can schedule me for an upper GI endoscopy  I Have been having EoE systems like I did back September of 2018  I can't eat or drink anything without getting immediately sick  I either am running to the bathroom to puke or pee, sometimes both  I've also had major upper belly bloating and pain in edition to coughing up blood and mucus  Everything I thought it was, Crohns flare, colitis flare, reflux, Jobie Gehrig, etc is not improving with any of the meds  I'm meek if I force myself to eat one meal a day because I know the pain and hours of puking and pooping that follow  This has been going on for at least 2 months on a daily bases and continued to get progressively worse  I have tried all the "tools(aka medications)" available in my box except thw Budesonide because it only just hit me today that it could be that  Please call me to discuss and schedule    Thank you, Ming You

## 2023-01-03 NOTE — TELEPHONE ENCOUNTER
Hi,    Is she able to get schedule for a hydration in the infusion center with pre-meds that morning?  The ativan would be for anxiety and that can be given with her procedural sedation    Thank you

## 2023-01-03 NOTE — TELEPHONE ENCOUNTER
Patient scheduled for 1/11/2023 for EGD  She is questioning premeds  She stated she usually has IV meds 1 hour prior to procedure for prednison, pepsid, benedryl, and ativan  She also wanted to know if you want orders for hydration

## 2023-01-03 NOTE — TELEPHONE ENCOUNTER
Scheduled date of EGD(as of today): 1/11/2023 (Per Jenny Bedoya and Dr Soraida Acevedo)  Physician performing EGD: Dr Soraida Acevedo   Location of EGD: [facility]  Instructions reviewed with patient by: Osito Monte via telephone   Procedure packet with EGD directions sent via [employer] as patient request   Clearances: N/A

## 2023-01-04 ENCOUNTER — HOSPITAL ENCOUNTER (OUTPATIENT)
Dept: INFUSION CENTER | Facility: HOSPITAL | Age: 48
End: 2023-01-04
Attending: INTERNAL MEDICINE

## 2023-01-05 ENCOUNTER — TELEPHONE (OUTPATIENT)
Dept: GASTROENTEROLOGY | Facility: CLINIC | Age: 48
End: 2023-01-05

## 2023-01-05 NOTE — TELEPHONE ENCOUNTER
Patient left a message on the office phone  Connected with patient  Patient is currently getting Xolair infusions at [facility]  Patient is inquiring if through her insurance there is an option for buy and bill to have the injection at [facility] instead of a nurse visiting the house to give her Stelara injection  Patient is due for next Stelara injection 1/18 which she would like to keep as scheduled with the visiting nurse  Patient also wanted to make the office aware the visiting nurse has not been able to supply gloves, alcohol wipes or syringes and the patient has been mostly using her own supplies but the supply is getting low  Desiree- Could you look into the patient's questions  Thank you!

## 2023-01-06 ENCOUNTER — HOSPITAL ENCOUNTER (OUTPATIENT)
Dept: INFUSION CENTER | Facility: HOSPITAL | Age: 48
End: 2023-01-06
Attending: INTERNAL MEDICINE

## 2023-01-06 ENCOUNTER — TELEPHONE (OUTPATIENT)
Dept: GASTROENTEROLOGY | Facility: CLINIC | Age: 48
End: 2023-01-06

## 2023-01-06 DIAGNOSIS — E44.1 MILD PROTEIN-CALORIE MALNUTRITION (HCC): ICD-10-CM

## 2023-01-06 DIAGNOSIS — R19.7 DIARRHEA, UNSPECIFIED TYPE: ICD-10-CM

## 2023-01-06 DIAGNOSIS — R63.0 ANOREXIA: Primary | ICD-10-CM

## 2023-01-06 RX ORDER — METHYLPREDNISOLONE SODIUM SUCCINATE 125 MG/2ML
60 INJECTION, POWDER, LYOPHILIZED, FOR SOLUTION INTRAMUSCULAR; INTRAVENOUS ONCE
Status: DISCONTINUED | OUTPATIENT
Start: 2023-01-06 | End: 2023-01-09 | Stop reason: HOSPADM

## 2023-01-06 RX ORDER — METHYLPREDNISOLONE SODIUM SUCCINATE 125 MG/2ML
60 INJECTION, POWDER, LYOPHILIZED, FOR SOLUTION INTRAMUSCULAR; INTRAVENOUS ONCE
Status: CANCELLED
Start: 2023-01-11 | End: 2023-01-07

## 2023-01-06 RX ADMIN — SODIUM CHLORIDE 1000 ML: 0.9 INJECTION, SOLUTION INTRAVENOUS at 10:20

## 2023-01-06 NOTE — TELEPHONE ENCOUNTER
----- Message from Mahogany Martinez MA sent at 1/6/2023 10:41 AM EST -----  Regarding: FW: Stelara Injections  Contact: [phone]    ----- Message -----  From: Amita Sam  Sent: 1/6/2023  10:40 AM EST  To: [facility]  Subject: Stelara Injections                               Technically it's a [facility]    this particular one just happens to be in the hospital  They do my xolair injections as a buy and bill so I no longer have to drive out to [place] for a 15 minute shot visit  I am high risk Anaphylaxis so although some people can self inject with xolair, I can't  Could that help to get special approval given my Mast Cell Activation and multiple Drug allergies and preservative hypersensitivity  Just like testing and procedures, I can't do anything that's not on a hospital setting  If you could explore other options verses someone coming into my home, my safe spaces, I'd appreciate that  I know [address] [place] [address] is a safe space and they do my  hydration there as well  That's maybe a point to discuss as well , I get my injections either right before or right after my IV hydration and IV benadryl and IV solumedral  To help prevent any Anaphylaxis episodes  With the new year, can I resume doing them myself if the other options don't work    ?

## 2023-01-09 ENCOUNTER — HOSPITAL ENCOUNTER (OUTPATIENT)
Dept: INFUSION CENTER | Facility: HOSPITAL | Age: 48
Discharge: HOME | End: 2023-01-09

## 2023-01-09 VITALS — TEMPERATURE: 97.9 F

## 2023-01-09 DIAGNOSIS — E44.1 MILD PROTEIN-CALORIE MALNUTRITION (HCC): ICD-10-CM

## 2023-01-09 DIAGNOSIS — R19.7 DIARRHEA, UNSPECIFIED TYPE: ICD-10-CM

## 2023-01-09 DIAGNOSIS — R63.0 ANOREXIA: Primary | ICD-10-CM

## 2023-01-09 DIAGNOSIS — L50.1 CHRONIC IDIOPATHIC URTICARIA: ICD-10-CM

## 2023-01-09 RX ORDER — METHYLPREDNISOLONE SODIUM SUCCINATE 125 MG/2ML
60 INJECTION, POWDER, LYOPHILIZED, FOR SOLUTION INTRAMUSCULAR; INTRAVENOUS ONCE
Status: DISCONTINUED | OUTPATIENT
Start: 2023-01-09 | End: 2023-01-12 | Stop reason: HOSPADM

## 2023-01-09 RX ORDER — EPINEPHRINE 1 MG/ML
0.3 INJECTION, SOLUTION, CONCENTRATE INTRAVENOUS
OUTPATIENT
Start: 2023-01-11

## 2023-01-09 RX ORDER — METHYLPREDNISOLONE SODIUM SUCCINATE 125 MG/2ML
60 INJECTION, POWDER, LYOPHILIZED, FOR SOLUTION INTRAMUSCULAR; INTRAVENOUS ONCE
Status: CANCELLED
Start: 2023-01-11 | End: 2023-01-10

## 2023-01-09 RX ORDER — EPINEPHRINE 1 MG/ML
0.3 INJECTION, SOLUTION, CONCENTRATE INTRAVENOUS
Status: DISCONTINUED | OUTPATIENT
Start: 2023-01-09 | End: 2023-01-12 | Stop reason: HOSPADM

## 2023-01-09 RX ADMIN — DIPHENHYDRAMINE HYDROCHLORIDE 50 MG: 50 INJECTION, SOLUTION INTRAMUSCULAR; INTRAVENOUS at 13:21

## 2023-01-09 RX ADMIN — SODIUM CHLORIDE 1000 ML: 0.9 INJECTION, SOLUTION INTRAVENOUS at 12:45

## 2023-01-09 RX ADMIN — OMALIZUMAB 300 MG: 150 INJECTION, SOLUTION SUBCUTANEOUS at 15:28

## 2023-01-09 NOTE — TELEPHONE ENCOUNTER
Connected with [facility] via phone  Spoke with Dianne Chavez  She confirmed that the patient has been receiving sharps containers, alcohol swabs, dressings, needles, and syringes  The nurse that comes brings her own gloves

## 2023-01-09 NOTE — TELEPHONE ENCOUNTER
Connected with the patient via phone  Celeste Núñez describes since the nurse restructuring at [facility] she has received less and less supplies and she is having to go into her supplies for her other medications  Per patient "I want less people in my safe place due to shampoos, hand sanitizers, chemicals, and certain smells  When I get the boxes delivered they have toxic plastics and are coated in a small amount of corn powder, they have VOC's in them " Patient would like injection at [facility] infusion center  She would like next injection with [facility] on 1/18 then [facility]  Patient is fully aware that there has been increased difficulty in getting non-FDA approved doses of Stelara approved  She was also made aware that her secondary Medicare refuses to  administration costs at our infusion centers since Medicare deems the drug self administer  Desiree: [facility] than [facility]  Thank you!

## 2023-01-11 ENCOUNTER — ANESTHESIA (OUTPATIENT)
Dept: PERIOP | Facility: HOSPITAL | Age: 48
End: 2023-01-11

## 2023-01-11 ENCOUNTER — HOSPITAL ENCOUNTER (OUTPATIENT)
Dept: INFUSION CENTER | Facility: HOSPITAL | Age: 48
Discharge: HOME | End: 2023-01-11

## 2023-01-11 ENCOUNTER — HOSPITAL ENCOUNTER (OUTPATIENT)
Dept: PERIOP | Facility: HOSPITAL | Age: 48
Setting detail: OUTPATIENT SURGERY
Discharge: HOME | End: 2023-01-11
Attending: INTERNAL MEDICINE

## 2023-01-11 ENCOUNTER — ANESTHESIA EVENT (OUTPATIENT)
Dept: PERIOP | Facility: HOSPITAL | Age: 48
End: 2023-01-11

## 2023-01-11 VITALS
BODY MASS INDEX: 29.69 KG/M2 | RESPIRATION RATE: 18 BRPM | DIASTOLIC BLOOD PRESSURE: 70 MMHG | OXYGEN SATURATION: 95 % | SYSTOLIC BLOOD PRESSURE: 103 MMHG | HEART RATE: 82 BPM | TEMPERATURE: 97.9 F | WEIGHT: 152 LBS

## 2023-01-11 VITALS
RESPIRATION RATE: 17 BRPM | HEART RATE: 112 BPM | DIASTOLIC BLOOD PRESSURE: 70 MMHG | OXYGEN SATURATION: 94 % | SYSTOLIC BLOOD PRESSURE: 102 MMHG | TEMPERATURE: 98.4 F

## 2023-01-11 DIAGNOSIS — R19.7 DIARRHEA, UNSPECIFIED TYPE: ICD-10-CM

## 2023-01-11 DIAGNOSIS — R10.13 DYSPEPSIA: ICD-10-CM

## 2023-01-11 DIAGNOSIS — R63.0 ANOREXIA: Primary | ICD-10-CM

## 2023-01-11 DIAGNOSIS — K50.119 CROHN'S DISEASE OF COLON WITH COMPLICATION (HCC): ICD-10-CM

## 2023-01-11 DIAGNOSIS — E44.1 MILD PROTEIN-CALORIE MALNUTRITION (HCC): ICD-10-CM

## 2023-01-11 RX ORDER — SODIUM CHLORIDE, SODIUM LACTATE, POTASSIUM CHLORIDE, CALCIUM CHLORIDE 600; 310; 30; 20 MG/100ML; MG/100ML; MG/100ML; MG/100ML
INJECTION, SOLUTION INTRAVENOUS CONTINUOUS PRN
Status: DISCONTINUED | OUTPATIENT
Start: 2023-01-11 | End: 2023-01-11

## 2023-01-11 RX ORDER — METHYLPREDNISOLONE SODIUM SUCCINATE 125 MG/2ML
60 INJECTION, POWDER, LYOPHILIZED, FOR SOLUTION INTRAMUSCULAR; INTRAVENOUS ONCE
Status: CANCELLED
Start: 2023-01-13 | End: 2023-01-12

## 2023-01-11 RX ORDER — LIDOCAINE HYDROCHLORIDE 20 MG/ML
INJECTION, SOLUTION EPIDURAL; INFILTRATION; INTRACAUDAL; PERINEURAL AS NEEDED
Status: DISCONTINUED | OUTPATIENT
Start: 2023-01-11 | End: 2023-01-11

## 2023-01-11 RX ORDER — METHYLPREDNISOLONE SODIUM SUCCINATE 125 MG/2ML
60 INJECTION, POWDER, LYOPHILIZED, FOR SOLUTION INTRAMUSCULAR; INTRAVENOUS ONCE
Status: COMPLETED | OUTPATIENT
Start: 2023-01-11 | End: 2023-01-11

## 2023-01-11 RX ORDER — SODIUM CHLORIDE 9 MG/ML
INJECTION, SOLUTION INTRAVENOUS CONTINUOUS PRN
Status: DISCONTINUED | OUTPATIENT
Start: 2023-01-11 | End: 2023-01-11

## 2023-01-11 RX ORDER — LORAZEPAM 2 MG/ML
1 INJECTION INTRAMUSCULAR ONCE
Status: DISCONTINUED | OUTPATIENT
Start: 2023-01-11 | End: 2023-01-15 | Stop reason: HOSPADM

## 2023-01-11 RX ORDER — LORAZEPAM 2 MG/ML
INJECTION INTRAMUSCULAR AS NEEDED
Status: DISCONTINUED | OUTPATIENT
Start: 2023-01-11 | End: 2023-01-11

## 2023-01-11 RX ORDER — PROPOFOL 10 MG/ML
INJECTION, EMULSION INTRAVENOUS AS NEEDED
Status: DISCONTINUED | OUTPATIENT
Start: 2023-01-11 | End: 2023-01-11

## 2023-01-11 RX ADMIN — SODIUM CHLORIDE: 9 INJECTION, SOLUTION INTRAVENOUS at 12:35

## 2023-01-11 RX ADMIN — FAMOTIDINE 40 MG: 10 INJECTION, SOLUTION INTRAVENOUS at 11:00

## 2023-01-11 RX ADMIN — METHYLPREDNISOLONE SODIUM SUCCINATE 60 MG: 125 INJECTION, POWDER, FOR SOLUTION INTRAMUSCULAR; INTRAVENOUS at 11:32

## 2023-01-11 RX ADMIN — LIDOCAINE HYDROCHLORIDE 100 MG: 20 INJECTION, SOLUTION EPIDURAL; INFILTRATION; INTRACAUDAL; PERINEURAL at 12:40

## 2023-01-11 RX ADMIN — PROPOFOL 150 MG: 10 INJECTION, EMULSION INTRAVENOUS at 12:40

## 2023-01-11 RX ADMIN — DIPHENHYDRAMINE HYDROCHLORIDE 50 MG: 50 INJECTION, SOLUTION INTRAMUSCULAR; INTRAVENOUS at 10:31

## 2023-01-11 RX ADMIN — SODIUM CHLORIDE 1000 ML: 0.9 INJECTION, SOLUTION INTRAVENOUS at 08:25

## 2023-01-11 RX ADMIN — PROPOFOL 20 MG: 10 INJECTION, EMULSION INTRAVENOUS at 12:46

## 2023-01-11 RX ADMIN — LORAZEPAM 1 MG: 2 INJECTION INTRAMUSCULAR; INTRAVENOUS at 12:38

## 2023-01-11 RX ADMIN — PROPOFOL 50 MG: 10 INJECTION, EMULSION INTRAVENOUS at 12:43

## 2023-01-11 NOTE — ANESTHESIA POSTPROCEDURE EVALUATION
Post-Op Assessment Note    CV Status:  Stable  Pain Score: 0    Pain management: adequate     Mental Status:  Awake and sleepy   Hydration Status:  Euvolemic   PONV Controlled:  Controlled   Airway Patency:  Patent      Post Op Vitals Reviewed: Yes      Staff: CRNA         No notable events documented      BP   103/72   Temp      Pulse  89   Resp   16   SpO2   100 Statement Selected

## 2023-01-11 NOTE — PROGRESS NOTES
Pt tolerated hydration and pre meds well   Pt discharged to Same Day Surgery unit with IV intact for EGD

## 2023-01-11 NOTE — H&P
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)     NSTEMI  pt denies, documented in cardio note   • Pancreatitis     sphinger of    • Psychiatric disorder    • RA (rheumatoid arthritis) ([facility] )    • Ulcerative colitis ([place] [address] )      Past Surgical History:   Procedure Laterality Date   • ABDOMINAL SURGERY  2017   • APPENDECTOMY     • CHOLECYSTECTOMY     • COLONOSCOPY  2019   • EGD AND COLONOSCOPY N/A 09/12/2017    Procedure: EGD AND COLONOSCOPY;  Surgeon: Kirit Watts MD;  Location: [facility]; Service: Gastroenterology   • ERCP N/A 09/15/2017    Procedure: ENDOSCOPIC RETROGRADE CHOLANGIOPANCREATOGRAPHY (ERCP); Surgeon: Veronica Kumar MD;  Location: [facility];   Service: Gastroenterology   • HYSTERECTOMY     • KNEE SURGERY Right    • LAPAROSCOPIC ENDOMETRIOSIS FULGURATION     • ORIF ANKLE FRACTURE Left    • WV ARTHRS KNE SURG W/MENISCECTOMY MED/LAT W/SHVG Left 05/12/2017    Procedure: POSSIBLE MEDIAL MENISECTOMY;  Surgeon: Zachary Isaacs MD;  Location: [facility];  Service: Orthopedics   • W[place] ARTHRS KNEE DEBRIDEMENT/SHAVING ARTCLR CRTLG Left 05/12/2017    Procedure: KNEE ARTHROSCOPY EVALUATION, CHONDROPLASTY;  Surgeon: Dinh Porras MD;  Location: [facility];  Service: Orthopedics   • WA LAPS ABD PRTM&OMENTUM DX W/WO [address]/[place] SPX N/A 12/12/2017    Procedure: LAPAROSCOPY DIAGNOSTIC  WITH LYSIS OF ADHESIONS;  Surgeon: Crow Levy DO;  Location:  MAIN OR;  Service: General   • UPPER GASTROINTESTINAL ENDOSCOPY  2019     Social History   Social History     Substance and Sexual Activity   Alcohol Use Never     Social History     Substance and Sexual Activity   Drug Use Never     Social History     Tobacco Use   Smoking Status Never   Smokeless Tobacco Never     Family History   Problem Relation Age of Onset   • Ulcerative colitis Mother    • Arthritis Mother    • Colon polyps Mother    • Heart failure Father    • Neuropathy Father    • Macular degeneration Father    • Diabetes Father    • Early death Father    • Vision loss Father    • Asthma Daughter    • Hypothyroidism Daughter    • Heart disease Maternal Grandmother    • Arthritis Maternal Grandmother    • No Known Problems Maternal Grandfather    • Arthritis Paternal Grandmother    • Macular degeneration Paternal Grandmother    • Vision loss Paternal Grandmother    • Lymphoma Paternal Grandfather    • Cancer Paternal Grandfather    • Early death Paternal Grandfather    • Breast cancer Maternal Aunt    • Cancer Maternal Aunt    • Miscarriages / Stillbirths Maternal Aunt    • Heart failure Paternal Aunt    • Heart failure Paternal Aunt    • Irritable bowel syndrome Paternal Aunt    • Breast cancer Paternal Aunt    • Breast cancer additional onset Paternal [de-identified]    • Hypothyroidism Paternal Aunt    • Cancer Paternal Aunt    • No Known Problems Son    • No Known Problems Son    • Kidney disease Brother    • Depression Paternal Uncle    • Early death Paternal Uncle        Meds/Allergies     (Not in a hospital admission)      Allergies   Allergen Reactions   • Aimovig [Erenumab-Aooe] Anaphylaxis   • Amitriptyline Anaphylaxis     According to the patient she had nphylaxis   • Aspergillus Allergy Skin Test Other (See Comments), Hives and Swelling   • Azathioprine Other (See Comments) and Anaphylaxis     pancreatitis  According to patient she needed Epipen   • Buspar [Buspirone] Hives and Shortness Of Breath   • Corn Dextrin Anaphylaxis     Any corn based products   • Eggs Or Egg-Derived Products - Food Allergy Anaphylaxis   • Gelatin - Food Allergy Anaphylaxis   • Heparin Hives     Painful welts    • Lactated Ringers Shortness Of Breath     Pt questions this allergy, pt has received LR multiple times without reaction   • Lavender Oil Anaphylaxis     Other reaction(s): anaphalxis   • Malto Dextrin [Dextrin] Anaphylaxis   • Other Anaphylaxis     Gel caps, maltodextrose, dextrose,grapes, lavender    • Penicillium Notatum Shortness Of Breath and Swelling   • Sumatriptan Anaphylaxis     Bradycardia, sob, back pressure, head pain,throat tightness  According to the patient she had anphylaxis   • Contrast [Iodinated Contrast Media] Other (See Comments)     Pt states needs to be premedicated prior to injection   • Corn Oil - Food Allergy - Food Allergy Hives   • Humira [Adalimumab] Other (See Comments)     "I develop drug induced lupus"   • Ibuprofen Hives and Throat Swelling   • Polyethylene Glycol Diarrhea and Vomiting   • Red Dye - Food Allergy Other (See Comments)     intolerance   • Remicade [Infliximab] Other (See Comments)     "I develop drug induced lupus"   • Sulfa Antibiotics Hives and Other (See Comments)   • Sulfate Hives   • Sulfites - Food Allergy Hives   • Chlorhexidine Itching   • Histamine Hives, Rash and Other (See Comments)     Intolerance         Objective     not currently breastfeeding  PHYSICAL EXAMINATION:    General Appearance:   Alert, cooperative, no distress   HEENT:  Normocephalic, atraumatic, anicteric  Neck supple, symmetrical, trachea midline  Lungs:   Equal chest rise and unlabored breathing, normal effort, no coughing     Cardiovascular: No visualized JVD  Abdomen:   No abdominal distension  Skin:   No jaundice, rashes, or lesions  Musculoskeletal:   Normal range of motion visualized  Psych:  Normal affect and normal insight  Neuro:  Alert and appropriate  ASSESSMENT/PLAN:  This is a 50y o  year old female here for EGD, and she is stable and optimized for her procedure

## 2023-01-11 NOTE — ANESTHESIA PREPROCEDURE EVALUATION
Procedure:  EGD    Relevant Problems   ANESTHESIA (within normal limits)      CARDIO  Most recent echo: normal LV fn, no wall motion abnormalities and valves OK   (+) Atrial tachycardia (HCC)   (+) CHF (congestive heart failure) (HCC)   (+) Migraine      ENDO   (+) Hypothyroidism (acquired)      /RENAL   (+) Nephrolithiasis      MUSCULOSKELETAL   (+) Chondromalacia   (+) Chronic back pain   (+) Fibromyalgia   (+) Primary localized osteoarthrosis of ankle and foot      NEURO/PSYCH   (+) Chronic back pain   (+) Fibromyalgia   (+) Generalized anxiety disorder   (+) Headache   (+) Migraine      PULMONARY (within normal limits)      Other   (+) 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HCC)        Physical Exam    Airway    Mallampati score: II  TM Distance: >3 FB  Neck ROM: full     Dental   No notable dental hx     Cardiovascular      Pulmonary      Other Findings        Anesthesia Plan  ASA Score- 2     Anesthesia Type- IV sedation with anesthesia with ASA Monitors  Additional Monitors:   Airway Plan:           Plan Factors-Exercise tolerance (METS): >4 METS  Chart reviewed  EKG reviewed  Existing labs reviewed  Patient summary reviewed  Patient is not a current smoker  Induction- intravenous  Postoperative Plan-     Informed Consent- Anesthetic plan and risks discussed with patient  I personally reviewed this patient with the CRNA  Discussed and agreed on the Anesthesia Plan with the CRNA  Avelina Rizo

## 2023-01-13 ENCOUNTER — HOSPITAL ENCOUNTER (OUTPATIENT)
Dept: INFUSION CENTER | Facility: HOSPITAL | Age: 48
End: 2023-01-13

## 2023-01-13 VITALS
TEMPERATURE: 98.4 F | OXYGEN SATURATION: 99 % | RESPIRATION RATE: 18 BRPM | SYSTOLIC BLOOD PRESSURE: 111 MMHG | DIASTOLIC BLOOD PRESSURE: 65 MMHG | HEART RATE: 100 BPM

## 2023-01-13 DIAGNOSIS — R19.7 DIARRHEA, UNSPECIFIED TYPE: ICD-10-CM

## 2023-01-13 DIAGNOSIS — R63.0 ANOREXIA: Primary | ICD-10-CM

## 2023-01-13 DIAGNOSIS — E44.1 MILD PROTEIN-CALORIE MALNUTRITION (HCC): ICD-10-CM

## 2023-01-13 RX ORDER — METHYLPREDNISOLONE SODIUM SUCCINATE 125 MG/2ML
60 INJECTION, POWDER, LYOPHILIZED, FOR SOLUTION INTRAMUSCULAR; INTRAVENOUS ONCE
Status: DISCONTINUED | OUTPATIENT
Start: 2023-01-13 | End: 2023-01-16 | Stop reason: HOSPADM

## 2023-01-13 RX ORDER — METHYLPREDNISOLONE SODIUM SUCCINATE 125 MG/2ML
60 INJECTION, POWDER, LYOPHILIZED, FOR SOLUTION INTRAMUSCULAR; INTRAVENOUS ONCE
Status: CANCELLED
Start: 2023-01-16 | End: 2023-01-14

## 2023-01-13 RX ADMIN — DIPHENHYDRAMINE HYDROCHLORIDE 50 MG: 50 INJECTION, SOLUTION INTRAMUSCULAR; INTRAVENOUS at 12:08

## 2023-01-13 RX ADMIN — SODIUM CHLORIDE 1000 ML: 0.9 INJECTION, SOLUTION INTRAVENOUS at 12:00

## 2023-01-13 NOTE — PROGRESS NOTES
[facility] Department of Neurology      SUBJECTIVE      [facility] Department of Neurology    This call was made to Ismael Shaw to discuss   Chief Complaint   Patient presents with   • Follow-up     extended follow up    • Headache     He is an established patient of our clinic.  He is in [place] and his identity has been established.   Ismael understands that we are limiting office visits due to the coronavirus pandemic and he consents to a virtual visit with charges submitted to his insurance.   21-30 minutes were spent in this encounter.  Without the patient being seen and evaluated in person, there is a risk that the information and/or assessment may be incomplete or inaccurate.    Patient returns to the neurology clinic at [facility] as a Virtual Visit today in follow up of their chronic neurologic disease.  Patient was last seen on 3/28/2020 in the hospital by Dr. Eitan Cast.  At said visit patient was seen for Metabolic encephalopathy secondary to coronavirus disease (COVID19).  Patient is seeing me as a new patient to me today regarding problems with headache for which he was recently seen in the ED.  At that time patient was treated with escitalopram (LEXAPRO) 10 MG tablet  apixaBAN (ELIQUIS) 5 MG Tab  albuterol (PROAIR HFA) 108 (90 Base) MCG/ACT inhaler  clotrimazole (LOTRIMIN) 1 % cream  ketoconazole (NIZORAL) 2 % cream  propylene glycol (SYSTANE BALANCE) 0.6 % Solution  glipiZIDE (GLUCOTROL) 10 MG tablet  metformin (GLUCOPHAGE) 1000 MG tablet  aspirin 81 MG chewable tablet    No current facility-administered medications on file prior to visit.   .  Since last visit patient states:    Patient reports onset of neurologic symptoms about two days ago.  Patients symptoms are located in his his ears, head, and hands and are described as sharp and weakness with tremor in quality respectively.  The aforementioned symptoms are mild to moderate in severity and occur anytime  Pt tolerated infusion well  Discharged in satisfactory condition  during the day but mainly with performing actions / movement.  When these symptoms do occur they typically variable in duration and are associated with difficulties in performing certain tasks.  Frequency of ongoing symptoms continues at daily.  Overall, symptoms are worsened by movement and may improve with rest.      Patient states since going through his recent COVID19 infection he has developed several symptoms including bilateral ear pain, right hand weakness, and tremors in both hands.  He had one overt headache attack for which he was seen in the ED and he has not had any headaches since.  This attack was sudden instant in onset on said date of 5/5/2020.  Denies any preceding head trauma or medication changes but again has had recent infectious process with novel corona virus.  He has no history of headache disorder.  Apart for the headache he reports that he has had new onset tremors in both hands that are present when he is doing things such as eating, writing, typing but do not seem to be present at rest.  Also, he states he feels his right hand has been weak for about two days.  Patient says he is sleeping good, and he has had overall good energy during the day.    Denies any other new neurologic signs or symptoms including loss of vision, difficulties speaking, or difficulties swallowing.  Bowel and bladder function intact.      PAST MEDICAL HISTORY and PROBLEM LIST  Patient Active Problem List   Diagnosis   • Pneumonia due to COVID-19 virus   • Elevated diaphragm   • Type 2 diabetes mellitus without complication, without long-term current use of insulin (CMS/HCC)   • Acute respiratory failure with hypercapnia (CMS/HCC)   • Severe protein-calorie malnutrition (CMS/HCC)   • COVID-19 virus detected   • Atrial flutter (CMS/HCC)   • Acute urinary retention   • Fever   • Anxiety and depression     Past Medical History:   Diagnosis Date   • Cataract    • Diabetes mellitus (CMS/HCC) 2003    Type 2   •  Hyperlipidemia    • Low back pain    • Mononeuritis    • Other nonspecific abnormal finding of lung field    • Post-traumatic stress disorder, chronic        MEDICATIONS  Reviewed and updated via EMR.  Current Outpatient Medications   Medication Sig Dispense Refill   • apixaBAN (ELIQUIS) 5 MG Tab Take 1 tablet by mouth every 12 hours. 60 tablet 0   • albuterol (PROAIR HFA) 108 (90 Base) MCG/ACT inhaler Inhale 2 puffs into the lungs every 6 hours as needed for Shortness of Breath or Wheezing.     • clotrimazole (LOTRIMIN) 1 % cream Apply topically 2 times daily as needed. To affected area(s) both shins     • ketoconazole (NIZORAL) 2 % cream Apply topically 2 times daily. 30 g 1   • propylene glycol (SYSTANE BALANCE) 0.6 % Solution Place 1 drop into both eyes 2 times daily as needed.      • glipiZIDE (GLUCOTROL) 10 MG tablet Take 2 tablets (20 mg) twice daily before meals     • metformin (GLUCOPHAGE) 1000 MG tablet Take 1,000 mg by mouth 2 times daily (with meals).     • aspirin 81 MG chewable tablet Chew 81 mg by mouth daily.       No current facility-administered medications for this visit.        ALLERGIES  ALLERGIES:   Allergen Reactions   • Bupropion Other (See Comments)     nightmares   • Dexmedetomidine Hcl CARDIAC DISTURBANCES     Severe hypotension and bradycardia   • Opioid Analgesics GI UPSET   • Penicillin G GI UPSET              TESTS        RADIOLOGY    Results for orders placed during the hospital encounter of 05/05/20   CT HEAD WO CONTRAST    Narrative CT HEAD WO CONTRAST    History: severe headache    Comparison: 3/20/2020    Technique: Axial noncontrast CT images of the brain were obtained. Sagittal  and coronal reformatted images were performed.    Findings:      There is no acute intracranial hemorrhage or mass effect.      No abnormal extra-axial fluid collection or hydrocephalus.    The cortical gray-white differentiation is preserved. The deep gray matter  nuclei and insular cortex are  visualized. There are no CT findings  suggestive of acute infarction in a major vessel territory.      The paranasal sinuses are clear.    No depressed calvaria fracture or destructive calvarial lesion.       Impression IMPRESSION:      1. No acute intracranial hemorrhage, mass effect or CT evidence of acute  ischemic infarction in a major vessel territory.               MEDICAL    EEG  Results for orders placed during the hospital encounter of 03/20/20   EEG - routine    Narrative  EEG REPORT    Patient: Ismael Shaw   Age: 65 year old  MRN: [id number]      Testing Date: 3/26/2020     Ordering Physician: Pineda Chavarria MD        Introduction: This 21-channel standard 10-20 electrode routine 30 minute EEG was recorded for a 65 year old male with a history of altered mental status, unresponsive. This EEG was performed to evaluate for focal or epileptiform abnormalities. The patient was on keppra, propofol, fentanyl.    The patient was not intentionally sleep deprived. Single lead EKG monitoring as well as infraorbital electrodes were included.       Description: The background activity during maximal recorded wakefulness consisted of a diffuse admixture of polymorphic theta and delta frequencies. The background was reactive and variable. There were no normal awake or asleep architecture. The background was discontinuous at times.    Activation was not performed.    There were no epileptiform discharges, focal abnormalities, or seizures recorded during the EEG.    The EKG channel demonstrated a normal sinus rhythm.    Interpretation  This EEG was abnormal. There was diffuse background slowing and discontinuity.    Clinical correlation  This EEG was abnormal. The diffuse background slowing suggests moderate encephalopathy.    Tomy Elizalde MD  Neurologist/ Epileptologist               LABS  All pertinent laboratory results were reviewed.  Admission on 05/05/2020, Discharged on 05/05/2020   Component Date Value  Ref Range Status   • Prothrombin Time 05/05/2020 11.9* 9.7 - 11.8 sec Final   • INR 05/05/2020 1.1  <=5.0 Final   • PTT 05/05/2020 29  22 - 32 sec Final   • Sodium 05/05/2020 137  135 - 145 mmol/L Final   • Potassium 05/05/2020 4.0  3.4 - 5.1 mmol/L Final   • Chloride 05/05/2020 105  98 - 107 mmol/L Final   • Carbon Dioxide 05/05/2020 26  21 - 32 mmol/L Final   • Anion Gap 05/05/2020 10  10 - 20 mmol/L Final   • Glucose 05/05/2020 105* 65 - 99 mg/dL Final   • BUN 05/05/2020 9  6 - 20 mg/dL Final   • Creatinine 05/05/2020 0.61* 0.67 - 1.17 mg/dL Final   • Glomerular Filtration Rate 05/05/2020 >90  >90 Final   • BUN/ Creatinine Ratio 05/05/2020 15  7 - 25 Final   • Bilirubin, Total 05/05/2020 0.6  0.2 - 1.0 mg/dL Final   • GOT/AST 05/05/2020 17  <=37 Units/L Final   • Alkaline Phosphatase 05/05/2020 69  45 - 117 Units/L Final   • Albumin 05/05/2020 3.4* 3.6 - 5.1 g/dL Final   • Protein, Total 05/05/2020 6.7  6.4 - 8.2 g/dL Final   • Globulin 05/05/2020 3.3  2.0 - 4.0 g/dL Final   • A/G Ratio 05/05/2020 1.0  1.0 - 2.4 Final   • GPT/ALT 05/05/2020 30  <64 Units/L Final   • Calcium 05/05/2020 8.7  8.4 - 10.2 mg/dL Final   • RBC Sedimentation Rate 05/05/2020 28* 0 - 20 mm/hr Final   • C-Reactive Protein 05/05/2020 <0.3  <=1.0 mg/dL Final   • WBC 05/05/2020 5.3  4.2 - 11.0 K/mcL Final   • RBC 05/05/2020 3.46* 4.50 - 5.90 mil/mcL Final   • HGB 05/05/2020 11.2* 13.0 - 17.0 g/dL Final   • HCT 05/05/2020 34.5* 39.0 - 51.0 % Final   • MCV 05/05/2020 99.7  78.0 - 100.0 fl Final   • MCH 05/05/2020 32.4  26.0 - 34.0 pg Final   • MCHC 05/05/2020 32.5  32.0 - 36.5 g/dL Final   • RDW-CV 05/05/2020 15.0  11.0 - 15.0 % Final   • PLT 05/05/2020 414  140 - 450 K/mcL Final   • NRBC 05/05/2020 0  <=0 /100 WBC Final   • Neutrophil, Percent 05/05/2020 51  % Final   • Lymphocytes, Percent 05/05/2020 38  % Final   • Mono, Percent 05/05/2020 7  % Final   • Eosinophils, Percent 05/05/2020 3  % Final   • Basophils, Percent 05/05/2020 1  % Final    • Immature Granulocytes 05/05/2020 0  % Final   • Absolute Neutrophils 05/05/2020 2.7  1.8 - 7.7 K/mcL Final   • Absolute Lymphocytes 05/05/2020 2.0  1.0 - 4.0 K/mcL Final   • Absolute Monocytes 05/05/2020 0.4  0.3 - 0.9 K/mcL Final   • Absolute Eosinophils  05/05/2020 0.1  0.1 - 0.5 K/mcL Final   • Absolute Basophils 05/05/2020 0.1  0.0 - 0.3 K/mcL Final   • Absolute Immmature Granulocytes 05/05/2020 0.0  0.0 - 0.2 K/mcL Final   • RDW-SD 05/05/2020 55.3* 39.0 - 50.0 fL Final   No results displayed because visit has over 200 results.      Orders Only on 03/20/2020   Component Date Value Ref Range Status   • SARS CoV-2, RNA Qualitative RT PCR 03/20/2020 Detected* Not Detected, Test Not Performed Final   • Reference Range Note 03/20/2020    Final                    Value:This result contains rich text formatting which cannot be displayed here.   • Disclaimer 03/20/2020    Final                    Value:This result contains rich text formatting which cannot be displayed here.   • Scanned Report 03/20/2020 See attached report   Final         IMPRESSION / REPORT / PLAN     New Onset Thunderclap Headache, Right Hand Weakness, and Intention Tremor following COVID19 Infection    CT head personally viewed without intracranial bleed or mass.  EEG personally read / viewed showing slowing / moderate encephalopathy.  Review of labs shows stable electrolytes, normal liver and renal function, normal ammonia, adequate vitamin B12, excess iron sores, normal lactic acid, mild anemia with normal platelets, no significant elevation in inflammatory markers, normal appearing thyroid function, elevated HgbA1c, negative infectious testing for Lyme, hepatitis, HSV, positive COVID19, and CSF without evidence for CNS inflammation or hemorrhage.      Plan    MRI brain with and without contrast.    For Headache Prophylaxis:   Trigger avoidance.  Maintain normal sleep/wake cycles, adequate daily fluid intake, and regimented timing of regular  caloric meals.      For Acute Headache Therapy:  Fioricet    Future Thoughts:      Orders Placed This Encounter   • MRI  BRAIN W WO CONTRAST   • butalbital-APAP-caffeine (FIORICET) -40 MG per tablet     1. Thunderclap headache    2. Right hand weakness    3. Intention tremor    4. History of 2019 novel coronavirus disease (COVID-19)      Follow up after testing.    CARE DISCUSSION:  The results of the evaluation were discussed with the patient.  Questions and concerns were addressed regarding the diagnosis and treatment recommendations.  They indicated  understanding of the issues discussed and agree with the plan of care.    All questions were answered and patient will notify the office if symptoms worsen or change in any way.     Adrien Rodrigez, DO  Neurology  Headache Medicine

## 2023-01-16 ENCOUNTER — APPOINTMENT (OUTPATIENT)
Dept: LAB | Facility: HOSPITAL | Age: 48
End: 2023-01-16
Attending: INTERNAL MEDICINE

## 2023-01-16 ENCOUNTER — HOSPITAL ENCOUNTER (OUTPATIENT)
Dept: INFUSION CENTER | Facility: HOSPITAL | Age: 48
Discharge: HOME | End: 2023-01-16

## 2023-01-16 VITALS
OXYGEN SATURATION: 98 % | HEART RATE: 94 BPM | DIASTOLIC BLOOD PRESSURE: 90 MMHG | SYSTOLIC BLOOD PRESSURE: 127 MMHG | RESPIRATION RATE: 17 BRPM | TEMPERATURE: 98.4 F

## 2023-01-16 DIAGNOSIS — E03.2 HYPOTHYROIDISM DUE TO MEDICAMENTS AND OTHER EXOGENOUS SUBSTANCES: ICD-10-CM

## 2023-01-16 DIAGNOSIS — E05.90 THYROTOXICOSIS WITHOUT THYROID STORM, UNSPECIFIED THYROTOXICOSIS TYPE: ICD-10-CM

## 2023-01-16 DIAGNOSIS — K50.119 CROHN'S DISEASE OF LARGE INTESTINE WITH COMPLICATION (HCC): ICD-10-CM

## 2023-01-16 DIAGNOSIS — D84.9 IMMUNOSUPPRESSION-RELATED INFECTIOUS DISEASE (HCC): ICD-10-CM

## 2023-01-16 DIAGNOSIS — K50.119 CROHN'S DISEASE OF COLON WITH COMPLICATION (HCC): ICD-10-CM

## 2023-01-16 DIAGNOSIS — R63.0 ANOREXIA: Primary | ICD-10-CM

## 2023-01-16 DIAGNOSIS — D89.49 OTHER MAST CELL ACTIVATION DISORDER (HCC): ICD-10-CM

## 2023-01-16 DIAGNOSIS — R19.7 DIARRHEA, UNSPECIFIED TYPE: ICD-10-CM

## 2023-01-16 DIAGNOSIS — K52.9 INFLAMMATORY BOWEL DISEASE: ICD-10-CM

## 2023-01-16 DIAGNOSIS — E44.1 MILD PROTEIN-CALORIE MALNUTRITION (HCC): ICD-10-CM

## 2023-01-16 DIAGNOSIS — B99.8 IMMUNOSUPPRESSION-RELATED INFECTIOUS DISEASE (HCC): ICD-10-CM

## 2023-01-16 DIAGNOSIS — E55.9 VITAMIN D DEFICIENCY: ICD-10-CM

## 2023-01-16 LAB
25(OH)D3 SERPL-MCNC: 20.3 NG/ML (ref 30–100)
ALBUMIN SERPL BCP-MCNC: 3.4 G/DL (ref 3.5–5)
ALP SERPL-CCNC: 67 U/L (ref 46–116)
ALT SERPL W P-5'-P-CCNC: 22 U/L (ref 12–78)
ANION GAP SERPL CALCULATED.3IONS-SCNC: 8 MMOL/L (ref 4–13)
AST SERPL W P-5'-P-CCNC: 19 U/L (ref 5–45)
BASOPHILS # BLD AUTO: 0.08 THOUSANDS/ÂΜL (ref 0–0.1)
BASOPHILS NFR BLD AUTO: 2 % (ref 0–1)
BILIRUB SERPL-MCNC: 0.2 MG/DL (ref 0.2–1)
BUN SERPL-MCNC: 11 MG/DL (ref 5–25)
CALCIUM ALBUM COR SERPL-MCNC: 9.3 MG/DL (ref 8.3–10.1)
CALCIUM SERPL-MCNC: 8.8 MG/DL (ref 8.3–10.1)
CHLORIDE SERPL-SCNC: 104 MMOL/L (ref 96–108)
CO2 SERPL-SCNC: 25 MMOL/L (ref 21–32)
CORTIS SERPL-MCNC: 25.1 UG/DL
CREAT SERPL-MCNC: 0.8 MG/DL (ref 0.6–1.3)
CRP SERPL QL: 5.4 MG/L
EOSINOPHIL # BLD AUTO: 0.08 THOUSAND/ÂΜL (ref 0–0.61)
EOSINOPHIL NFR BLD AUTO: 2 % (ref 0–6)
ERYTHROCYTE [DISTWIDTH] IN BLOOD BY AUTOMATED COUNT: 14.2 % (ref 11.6–15.1)
GFR SERPL CREATININE-BSD FRML MDRD: 86 ML/MIN/1.73SQ M
GLUCOSE SERPL-MCNC: 122 MG/DL (ref 65–140)
HCT VFR BLD AUTO: 42.4 % (ref 34.8–46.1)
HGB BLD-MCNC: 14.4 G/DL (ref 11.5–15.4)
IMM GRANULOCYTES # BLD AUTO: 0.01 THOUSAND/UL (ref 0–0.2)
IMM GRANULOCYTES NFR BLD AUTO: 0 % (ref 0–2)
LYMPHOCYTES # BLD AUTO: 2.31 THOUSANDS/ÂΜL (ref 0.6–4.47)
LYMPHOCYTES NFR BLD AUTO: 43 % (ref 14–44)
MCH RBC QN AUTO: 30.4 PG (ref 26.8–34.3)
MCHC RBC AUTO-ENTMCNC: 34 G/DL (ref 31.4–37.4)
MCV RBC AUTO: 90 FL (ref 82–98)
MONOCYTES # BLD AUTO: 0.45 THOUSAND/ÂΜL (ref 0.17–1.22)
MONOCYTES NFR BLD AUTO: 8 % (ref 4–12)
NEUTROPHILS # BLD AUTO: 2.48 THOUSANDS/ÂΜL (ref 1.85–7.62)
NEUTS SEG NFR BLD AUTO: 45 % (ref 43–75)
NRBC BLD AUTO-RTO: 0 /100 WBCS
PLATELET # BLD AUTO: 487 THOUSANDS/UL (ref 149–390)
PMV BLD AUTO: 8.9 FL (ref 8.9–12.7)
POTASSIUM SERPL-SCNC: 3.3 MMOL/L (ref 3.5–5.3)
PROT SERPL-MCNC: 7.3 G/DL (ref 6.4–8.4)
RBC # BLD AUTO: 4.73 MILLION/UL (ref 3.81–5.12)
SODIUM SERPL-SCNC: 137 MMOL/L (ref 135–147)
T3FREE SERPL-MCNC: 1.44 PG/ML (ref 2.3–4.2)
T4 FREE SERPL-MCNC: 0.83 NG/DL (ref 0.76–1.46)
TSH SERPL DL<=0.05 MIU/L-ACNC: 1.68 UIU/ML (ref 0.45–4.5)
WBC # BLD AUTO: 5.41 THOUSAND/UL (ref 4.31–10.16)

## 2023-01-16 RX ORDER — METHYLPREDNISOLONE SODIUM SUCCINATE 125 MG/2ML
60 INJECTION, POWDER, LYOPHILIZED, FOR SOLUTION INTRAMUSCULAR; INTRAVENOUS ONCE
Status: COMPLETED | OUTPATIENT
Start: 2023-01-16 | End: 2023-01-16

## 2023-01-16 RX ORDER — METHYLPREDNISOLONE SODIUM SUCCINATE 125 MG/2ML
60 INJECTION, POWDER, LYOPHILIZED, FOR SOLUTION INTRAMUSCULAR; INTRAVENOUS ONCE
Status: CANCELLED
Start: 2023-01-19 | End: 2023-01-17

## 2023-01-16 RX ADMIN — SODIUM CHLORIDE 1000 ML: 0.9 INJECTION, SOLUTION INTRAVENOUS at 12:57

## 2023-01-16 RX ADMIN — METHYLPREDNISOLONE SODIUM SUCCINATE 60 MG: 125 INJECTION, POWDER, FOR SOLUTION INTRAMUSCULAR; INTRAVENOUS at 15:28

## 2023-01-16 RX ADMIN — DIPHENHYDRAMINE HYDROCHLORIDE 50 MG: 50 INJECTION, SOLUTION INTRAMUSCULAR; INTRAVENOUS at 13:16

## 2023-01-18 LAB — ACTH PLAS-MCNC: <1.5 PG/ML (ref 7.2–63.3)

## 2023-01-20 DIAGNOSIS — E55.9 VITAMIN D DEFICIENCY: Primary | ICD-10-CM

## 2023-01-20 LAB — VIT C SERPL-MCNC: 0.3 MG/DL (ref 0.4–2)

## 2023-01-21 LAB — PHYTONADIONE SERPL-MCNC: 1.67 NG/ML (ref 0.1–2.2)

## 2023-01-23 ENCOUNTER — HOSPITAL ENCOUNTER (OUTPATIENT)
Dept: INFUSION CENTER | Facility: HOSPITAL | Age: 48
Discharge: HOME | End: 2023-01-23

## 2023-01-23 ENCOUNTER — PATIENT MESSAGE (OUTPATIENT)
Dept: GASTROENTEROLOGY | Facility: CLINIC | Age: 48
End: 2023-01-23

## 2023-01-23 VITALS
DIASTOLIC BLOOD PRESSURE: 91 MMHG | SYSTOLIC BLOOD PRESSURE: 151 MMHG | TEMPERATURE: 98.3 F | RESPIRATION RATE: 18 BRPM | OXYGEN SATURATION: 98 % | HEART RATE: 108 BPM

## 2023-01-23 DIAGNOSIS — R63.0 ANOREXIA: Primary | ICD-10-CM

## 2023-01-23 DIAGNOSIS — R19.7 DIARRHEA, UNSPECIFIED TYPE: ICD-10-CM

## 2023-01-23 DIAGNOSIS — E44.1 MILD PROTEIN-CALORIE MALNUTRITION (HCC): ICD-10-CM

## 2023-01-23 RX ORDER — METHYLPREDNISOLONE SODIUM SUCCINATE 125 MG/2ML
60 INJECTION, POWDER, LYOPHILIZED, FOR SOLUTION INTRAMUSCULAR; INTRAVENOUS ONCE
Status: CANCELLED
Start: 2023-01-26 | End: 2023-01-24

## 2023-01-23 RX ORDER — METHYLPREDNISOLONE SODIUM SUCCINATE 125 MG/2ML
60 INJECTION, POWDER, LYOPHILIZED, FOR SOLUTION INTRAMUSCULAR; INTRAVENOUS ONCE
Status: COMPLETED | OUTPATIENT
Start: 2023-01-23 | End: 2023-01-23

## 2023-01-23 RX ADMIN — SODIUM CHLORIDE 1000 ML: 0.9 INJECTION, SOLUTION INTRAVENOUS at 11:22

## 2023-01-23 RX ADMIN — DIPHENHYDRAMINE HYDROCHLORIDE 50 MG: 50 INJECTION, SOLUTION INTRAMUSCULAR; INTRAVENOUS at 11:40

## 2023-01-23 RX ADMIN — METHYLPREDNISOLONE SODIUM SUCCINATE 60 MG: 125 INJECTION, POWDER, FOR SOLUTION INTRAMUSCULAR; INTRAVENOUS at 11:36

## 2023-01-26 ENCOUNTER — HOSPITAL ENCOUNTER (OUTPATIENT)
Dept: INFUSION CENTER | Facility: HOSPITAL | Age: 48
Discharge: HOME | End: 2023-01-26
Attending: INTERNAL MEDICINE

## 2023-01-26 ENCOUNTER — HOSPITAL ENCOUNTER (OUTPATIENT)
Dept: CT IMAGING | Facility: HOSPITAL | Age: 48
Discharge: HOME | End: 2023-01-26

## 2023-01-26 VITALS
OXYGEN SATURATION: 100 % | TEMPERATURE: 98 F | HEART RATE: 98 BPM | SYSTOLIC BLOOD PRESSURE: 125 MMHG | DIASTOLIC BLOOD PRESSURE: 89 MMHG | RESPIRATION RATE: 17 BRPM

## 2023-01-26 DIAGNOSIS — R19.7 DIARRHEA, UNSPECIFIED TYPE: ICD-10-CM

## 2023-01-26 DIAGNOSIS — E44.1 MILD PROTEIN-CALORIE MALNUTRITION (HCC): ICD-10-CM

## 2023-01-26 DIAGNOSIS — R63.0 ANOREXIA: Primary | ICD-10-CM

## 2023-01-26 DIAGNOSIS — R31.0 GROSS HEMATURIA: ICD-10-CM

## 2023-01-26 LAB
THYROGLOB AB SERPL-ACNC: 1.7 IU/ML (ref 0–0.9)
THYROGLOB SERPL-MCNC: 9.6 NG/ML

## 2023-01-26 RX ORDER — METHYLPREDNISOLONE SODIUM SUCCINATE 125 MG/2ML
60 INJECTION, POWDER, LYOPHILIZED, FOR SOLUTION INTRAMUSCULAR; INTRAVENOUS ONCE
Status: COMPLETED | OUTPATIENT
Start: 2023-01-26 | End: 2023-01-26

## 2023-01-26 RX ORDER — METHYLPREDNISOLONE SODIUM SUCCINATE 125 MG/2ML
60 INJECTION, POWDER, LYOPHILIZED, FOR SOLUTION INTRAMUSCULAR; INTRAVENOUS ONCE
Status: CANCELLED
Start: 2023-01-30 | End: 2023-01-27

## 2023-01-26 RX ADMIN — METHYLPREDNISOLONE SODIUM SUCCINATE 60 MG: 125 INJECTION, POWDER, FOR SOLUTION INTRAMUSCULAR; INTRAVENOUS at 09:49

## 2023-01-26 RX ADMIN — DIPHENHYDRAMINE HYDROCHLORIDE 50 MG: 50 INJECTION, SOLUTION INTRAMUSCULAR; INTRAVENOUS at 09:56

## 2023-01-26 RX ADMIN — IOHEXOL 100 ML: 350 INJECTION, SOLUTION INTRAVENOUS at 13:25

## 2023-01-26 RX ADMIN — SODIUM CHLORIDE 1000 ML: 0.9 INJECTION, SOLUTION INTRAVENOUS at 09:25

## 2023-01-30 ENCOUNTER — HOSPITAL ENCOUNTER (OUTPATIENT)
Dept: INFUSION CENTER | Facility: HOSPITAL | Age: 48
Discharge: HOME | End: 2023-01-30
Attending: INTERNAL MEDICINE

## 2023-01-30 ENCOUNTER — DOCTOR'S OFFICE (OUTPATIENT)
Dept: URBAN - NONMETROPOLITAN AREA CLINIC 1 | Facility: CLINIC | Age: 48
Setting detail: OPHTHALMOLOGY
End: 2023-01-30
Payer: COMMERCIAL

## 2023-01-30 ENCOUNTER — APPOINTMENT (OUTPATIENT)
Dept: LAB | Facility: HOSPITAL | Age: 48
End: 2023-01-30
Attending: INTERNAL MEDICINE

## 2023-01-30 VITALS
DIASTOLIC BLOOD PRESSURE: 82 MMHG | OXYGEN SATURATION: 96 % | SYSTOLIC BLOOD PRESSURE: 122 MMHG | TEMPERATURE: 98.3 F | RESPIRATION RATE: 17 BRPM | HEART RATE: 90 BPM

## 2023-01-30 DIAGNOSIS — R63.0 ANOREXIA: Primary | ICD-10-CM

## 2023-01-30 DIAGNOSIS — E44.1 MILD PROTEIN-CALORIE MALNUTRITION (HCC): ICD-10-CM

## 2023-01-30 DIAGNOSIS — B99.8 IMMUNOSUPPRESSION-RELATED INFECTIOUS DISEASE (HCC): ICD-10-CM

## 2023-01-30 DIAGNOSIS — E55.9 VITAMIN D DEFICIENCY DISEASE: ICD-10-CM

## 2023-01-30 DIAGNOSIS — R31.0 GROSS HEMATURIA: ICD-10-CM

## 2023-01-30 DIAGNOSIS — R19.7 DIARRHEA, UNSPECIFIED TYPE: ICD-10-CM

## 2023-01-30 DIAGNOSIS — K50.119 CROHN'S DISEASE OF LARGE INTESTINE WITH COMPLICATION (HCC): ICD-10-CM

## 2023-01-30 DIAGNOSIS — H40.003: ICD-10-CM

## 2023-01-30 DIAGNOSIS — E05.90 PRETIBIAL MYXEDEMA: ICD-10-CM

## 2023-01-30 DIAGNOSIS — H46.8: ICD-10-CM

## 2023-01-30 DIAGNOSIS — H43.813: ICD-10-CM

## 2023-01-30 DIAGNOSIS — K52.9 INFLAMMATORY BOWEL DISEASE: ICD-10-CM

## 2023-01-30 DIAGNOSIS — D89.49 OTHER MAST CELL ACTIVATION DISORDER (HCC): ICD-10-CM

## 2023-01-30 DIAGNOSIS — D84.9 IMMUNOSUPPRESSION-RELATED INFECTIOUS DISEASE (HCC): ICD-10-CM

## 2023-01-30 DIAGNOSIS — E03.2 IATROGENIC HYPOTHYROIDISM: ICD-10-CM

## 2023-01-30 DIAGNOSIS — H25.13: ICD-10-CM

## 2023-01-30 LAB
ALBUMIN SERPL BCP-MCNC: 4.4 G/DL (ref 3.5–5)
ALP SERPL-CCNC: 57 U/L (ref 34–104)
ALT SERPL W P-5'-P-CCNC: 18 U/L (ref 7–52)
ANION GAP SERPL CALCULATED.3IONS-SCNC: 10 MMOL/L (ref 4–13)
AST SERPL W P-5'-P-CCNC: 18 U/L (ref 13–39)
BASOPHILS # BLD MANUAL: 0 THOUSAND/UL (ref 0–0.1)
BASOPHILS NFR MAR MANUAL: 0 % (ref 0–1)
BILIRUB SERPL-MCNC: 0.26 MG/DL (ref 0.2–1)
BUN SERPL-MCNC: 10 MG/DL (ref 5–25)
CALCIUM SERPL-MCNC: 8.9 MG/DL (ref 8.4–10.2)
CHLORIDE SERPL-SCNC: 105 MMOL/L (ref 96–108)
CO2 SERPL-SCNC: 23 MMOL/L (ref 21–32)
CREAT SERPL-MCNC: 0.64 MG/DL (ref 0.6–1.3)
CRP SERPL QL: 1.6 MG/L
EOSINOPHIL # BLD MANUAL: 0 THOUSAND/UL (ref 0–0.4)
EOSINOPHIL NFR BLD MANUAL: 0 % (ref 0–6)
ERYTHROCYTE [DISTWIDTH] IN BLOOD BY AUTOMATED COUNT: 14.7 % (ref 11.6–15.1)
GFR SERPL CREATININE-BSD FRML MDRD: 105 ML/MIN/1.73SQ M
GLUCOSE SERPL-MCNC: 161 MG/DL (ref 65–140)
HCT VFR BLD AUTO: 41.5 % (ref 34.8–46.1)
HGB BLD-MCNC: 14.1 G/DL (ref 11.5–15.4)
LG PLATELETS BLD QL SMEAR: PRESENT
LYMPHOCYTES # BLD AUTO: 0.8 THOUSAND/UL (ref 0.6–4.47)
LYMPHOCYTES # BLD AUTO: 13 % (ref 14–44)
MCH RBC QN AUTO: 30.3 PG (ref 26.8–34.3)
MCHC RBC AUTO-ENTMCNC: 34 G/DL (ref 31.4–37.4)
MCV RBC AUTO: 89 FL (ref 82–98)
MONOCYTES # BLD AUTO: 0.06 THOUSAND/UL (ref 0–1.22)
MONOCYTES NFR BLD: 1 % (ref 4–12)
NEUTROPHILS # BLD MANUAL: 5.32 THOUSAND/UL (ref 1.85–7.62)
NEUTS SEG NFR BLD AUTO: 86 % (ref 43–75)
PLATELET # BLD AUTO: 568 THOUSANDS/UL (ref 149–390)
PLATELET BLD QL SMEAR: ABNORMAL
PMV BLD AUTO: 8.8 FL (ref 8.9–12.7)
POLYCHROMASIA BLD QL SMEAR: PRESENT
POTASSIUM SERPL-SCNC: 3.6 MMOL/L (ref 3.5–5.3)
PROT SERPL-MCNC: 7.4 G/DL (ref 6.4–8.4)
RBC # BLD AUTO: 4.65 MILLION/UL (ref 3.81–5.12)
SODIUM SERPL-SCNC: 138 MMOL/L (ref 135–147)
T3FREE SERPL-MCNC: 2.66 PG/ML (ref 2.3–4.2)
T4 FREE SERPL-MCNC: 1 NG/DL (ref 0.76–1.46)
TSH SERPL DL<=0.05 MIU/L-ACNC: 0.37 UIU/ML (ref 0.45–4.5)
WBC # BLD AUTO: 6.19 THOUSAND/UL (ref 4.31–10.16)

## 2023-01-30 PROCEDURE — 99213 OFFICE O/P EST LOW 20 MIN: CPT | Performed by: OPHTHALMOLOGY

## 2023-01-30 PROCEDURE — 76514 ECHO EXAM OF EYE THICKNESS: CPT | Performed by: OPHTHALMOLOGY

## 2023-01-30 PROCEDURE — 92133 CPTRZD OPH DX IMG PST SGM ON: CPT | Performed by: OPHTHALMOLOGY

## 2023-01-30 RX ORDER — METHYLPREDNISOLONE SODIUM SUCCINATE 125 MG/2ML
60 INJECTION, POWDER, LYOPHILIZED, FOR SOLUTION INTRAMUSCULAR; INTRAVENOUS ONCE
Status: CANCELLED
Start: 2023-01-31

## 2023-01-30 RX ORDER — METHYLPREDNISOLONE SODIUM SUCCINATE 125 MG/2ML
60 INJECTION, POWDER, LYOPHILIZED, FOR SOLUTION INTRAMUSCULAR; INTRAVENOUS ONCE
Status: COMPLETED | OUTPATIENT
Start: 2023-01-30 | End: 2023-01-30

## 2023-01-30 RX ADMIN — SODIUM CHLORIDE 1000 ML: 0.9 INJECTION, SOLUTION INTRAVENOUS at 11:45

## 2023-01-30 RX ADMIN — METHYLPREDNISOLONE SODIUM SUCCINATE 60 MG: 125 INJECTION, POWDER, FOR SOLUTION INTRAMUSCULAR; INTRAVENOUS at 12:07

## 2023-01-30 RX ADMIN — DIPHENHYDRAMINE HYDROCHLORIDE 50 MG: 50 INJECTION, SOLUTION INTRAMUSCULAR; INTRAVENOUS at 12:14

## 2023-01-30 ASSESSMENT — CONFRONTATIONAL VISUAL FIELD TEST (CVF)
OD_FINDINGS: FULL
OS_FINDINGS: FULL

## 2023-01-30 ASSESSMENT — PACHYMETRY
OD_CT_CORRECTION: -6
OS_CT_UM: 615
OS_CT_CORRECTION: -5
OD_CT_UM: 622

## 2023-01-30 ASSESSMENT — VISUAL ACUITY
OS_BCVA: 20/20-2
OD_BCVA: 20/20-1

## 2023-01-31 LAB — 25(OH)D3 SERPL-MCNC: 30.7 NG/ML (ref 30–100)

## 2023-02-01 ENCOUNTER — TELEPHONE (OUTPATIENT)
Dept: UROLOGY | Facility: CLINIC | Age: 48
End: 2023-02-01

## 2023-02-01 LAB
THYROGLOB AB SERPL-ACNC: <1 IU/ML (ref 0–0.9)
THYROGLOB SERPL-MCNC: 26.7 NG/ML (ref 1.5–38.5)

## 2023-02-01 NOTE — TELEPHONE ENCOUNTER
Received incoming call from Shannan in the Radiology Department reporting significant findings on patients CT renal protocol  Will forward to provider to review results

## 2023-02-02 ENCOUNTER — HOSPITAL ENCOUNTER (OUTPATIENT)
Dept: INFUSION CENTER | Facility: HOSPITAL | Age: 48
Discharge: HOME | End: 2023-02-02
Attending: INTERNAL MEDICINE

## 2023-02-02 VITALS
DIASTOLIC BLOOD PRESSURE: 86 MMHG | TEMPERATURE: 98.1 F | SYSTOLIC BLOOD PRESSURE: 128 MMHG | HEART RATE: 110 BPM | OXYGEN SATURATION: 95 % | RESPIRATION RATE: 17 BRPM

## 2023-02-02 DIAGNOSIS — R19.7 DIARRHEA, UNSPECIFIED TYPE: ICD-10-CM

## 2023-02-02 DIAGNOSIS — R63.0 ANOREXIA: Primary | ICD-10-CM

## 2023-02-02 DIAGNOSIS — L50.1 CHRONIC IDIOPATHIC URTICARIA: ICD-10-CM

## 2023-02-02 DIAGNOSIS — E44.1 MILD PROTEIN-CALORIE MALNUTRITION (HCC): ICD-10-CM

## 2023-02-02 RX ORDER — METHYLPREDNISOLONE SODIUM SUCCINATE 125 MG/2ML
60 INJECTION, POWDER, LYOPHILIZED, FOR SOLUTION INTRAMUSCULAR; INTRAVENOUS ONCE
Status: CANCELLED
Start: 2023-02-06 | End: 2023-02-03

## 2023-02-02 RX ORDER — METHYLPREDNISOLONE SODIUM SUCCINATE 125 MG/2ML
60 INJECTION, POWDER, LYOPHILIZED, FOR SOLUTION INTRAMUSCULAR; INTRAVENOUS ONCE
Status: COMPLETED | OUTPATIENT
Start: 2023-02-02 | End: 2023-02-02

## 2023-02-02 RX ORDER — EPINEPHRINE 1 MG/ML
0.3 INJECTION, SOLUTION, CONCENTRATE INTRAVENOUS
Status: DISCONTINUED | OUTPATIENT
Start: 2023-02-02 | End: 2023-02-05 | Stop reason: HOSPADM

## 2023-02-02 RX ORDER — EPINEPHRINE 1 MG/ML
0.3 INJECTION, SOLUTION, CONCENTRATE INTRAVENOUS
OUTPATIENT
Start: 2023-02-20

## 2023-02-02 RX ADMIN — METHYLPREDNISOLONE SODIUM SUCCINATE 60 MG: 125 INJECTION, POWDER, FOR SOLUTION INTRAMUSCULAR; INTRAVENOUS at 12:56

## 2023-02-02 RX ADMIN — DIPHENHYDRAMINE HYDROCHLORIDE 50 MG: 50 INJECTION, SOLUTION INTRAMUSCULAR; INTRAVENOUS at 13:02

## 2023-02-02 RX ADMIN — OMALIZUMAB 300 MG: 150 INJECTION, SOLUTION SUBCUTANEOUS at 14:57

## 2023-02-02 RX ADMIN — SODIUM CHLORIDE 1000 ML: 0.9 INJECTION, SOLUTION INTRAVENOUS at 12:00

## 2023-02-03 ENCOUNTER — TELEPHONE (OUTPATIENT)
Dept: UROLOGY | Facility: MEDICAL CENTER | Age: 48
End: 2023-02-03

## 2023-02-03 LAB — VIT C SERPL-MCNC: 2.2 MG/DL (ref 0.4–2)

## 2023-02-03 NOTE — TELEPHONE ENCOUNTER
----- Message from Dani Edge  sent at 2/3/2023  4:40 PM EST -----  Reviewed results of CT Scan   Pt to follow up in the office, I do not see follow up scheduled

## 2023-02-06 ENCOUNTER — HOSPITAL ENCOUNTER (OUTPATIENT)
Dept: INFUSION CENTER | Facility: HOSPITAL | Age: 48
Discharge: HOME | End: 2023-02-06
Attending: INTERNAL MEDICINE

## 2023-02-06 ENCOUNTER — TELEPHONE (OUTPATIENT)
Dept: FAMILY MEDICINE CLINIC | Facility: CLINIC | Age: 48
End: 2023-02-06

## 2023-02-06 ENCOUNTER — OFFICE VISIT (OUTPATIENT)
Dept: FAMILY MEDICINE CLINIC | Facility: CLINIC | Age: 48
End: 2023-02-06

## 2023-02-06 VITALS
HEART RATE: 105 BPM | DIASTOLIC BLOOD PRESSURE: 72 MMHG | OXYGEN SATURATION: 95 % | SYSTOLIC BLOOD PRESSURE: 126 MMHG | RESPIRATION RATE: 17 BRPM | TEMPERATURE: 98 F

## 2023-02-06 VITALS
SYSTOLIC BLOOD PRESSURE: 112 MMHG | HEIGHT: 63 IN | OXYGEN SATURATION: 98 % | BODY MASS INDEX: 26.75 KG/M2 | RESPIRATION RATE: 20 BRPM | TEMPERATURE: 99.4 F | WEIGHT: 151 LBS | HEART RATE: 115 BPM | DIASTOLIC BLOOD PRESSURE: 80 MMHG

## 2023-02-06 DIAGNOSIS — R63.0 ANOREXIA: Primary | ICD-10-CM

## 2023-02-06 DIAGNOSIS — R19.7 DIARRHEA, UNSPECIFIED TYPE: ICD-10-CM

## 2023-02-06 DIAGNOSIS — E44.1 MILD PROTEIN-CALORIE MALNUTRITION (HCC): ICD-10-CM

## 2023-02-06 DIAGNOSIS — T78.2XXD ANAPHYLAXIS, SUBSEQUENT ENCOUNTER: ICD-10-CM

## 2023-02-06 DIAGNOSIS — R11.0 NAUSEA: ICD-10-CM

## 2023-02-06 DIAGNOSIS — Z00.00 MEDICARE ANNUAL WELLNESS VISIT, INITIAL: Primary | ICD-10-CM

## 2023-02-06 RX ORDER — EPINEPHRINE 0.3 MG/.3ML
INJECTION SUBCUTANEOUS
COMMUNITY
Start: 2023-01-04 | End: 2023-02-06

## 2023-02-06 RX ORDER — ONDANSETRON 4 MG/1
4 TABLET, FILM COATED ORAL EVERY 8 HOURS PRN
Qty: 20 TABLET | Refills: 3 | Status: SHIPPED | OUTPATIENT
Start: 2023-02-06 | End: 2023-02-06 | Stop reason: ALTCHOICE

## 2023-02-06 RX ORDER — LEVOTHYROXINE SODIUM 0.1 MG/1
100 TABLET ORAL EVERY OTHER DAY
COMMUNITY

## 2023-02-06 RX ORDER — ONDANSETRON 4 MG/1
4 TABLET, ORALLY DISINTEGRATING ORAL EVERY 6 HOURS PRN
Qty: 20 TABLET | Refills: 0 | Status: SHIPPED | OUTPATIENT
Start: 2023-02-06 | End: 2023-02-06 | Stop reason: ALTCHOICE

## 2023-02-06 RX ORDER — METHYLPREDNISOLONE SODIUM SUCCINATE 125 MG/2ML
60 INJECTION, POWDER, LYOPHILIZED, FOR SOLUTION INTRAMUSCULAR; INTRAVENOUS ONCE
Status: CANCELLED
Start: 2023-02-09 | End: 2023-02-07

## 2023-02-06 RX ORDER — EPINEPHRINE 0.3 MG/.3ML
0.3 INJECTION SUBCUTANEOUS ONCE
Qty: 0.6 ML | Refills: 0 | Status: SHIPPED | OUTPATIENT
Start: 2023-02-06 | End: 2023-02-15

## 2023-02-06 RX ORDER — BUDESONIDE 3 MG/1
CAPSULE, COATED PELLETS ORAL
COMMUNITY
Start: 2023-01-09

## 2023-02-06 RX ORDER — METHYLPREDNISOLONE SODIUM SUCCINATE 125 MG/2ML
60 INJECTION, POWDER, LYOPHILIZED, FOR SOLUTION INTRAMUSCULAR; INTRAVENOUS ONCE
Status: COMPLETED | OUTPATIENT
Start: 2023-02-06 | End: 2023-02-06

## 2023-02-06 RX ORDER — LACTOBACILLUS RHAMNOSUS GG 10B CELL
200 CAPSULE ORAL DAILY
COMMUNITY

## 2023-02-06 RX ADMIN — METHYLPREDNISOLONE SODIUM SUCCINATE 60 MG: 125 INJECTION, POWDER, FOR SOLUTION INTRAMUSCULAR; INTRAVENOUS at 11:49

## 2023-02-06 RX ADMIN — DIPHENHYDRAMINE HYDROCHLORIDE 50 MG: 50 INJECTION, SOLUTION INTRAMUSCULAR; INTRAVENOUS at 11:55

## 2023-02-06 RX ADMIN — SODIUM CHLORIDE 1000 ML: 0.9 INJECTION, SOLUTION INTRAVENOUS at 11:37

## 2023-02-06 NOTE — TELEPHONE ENCOUNTER
Hi, this is Rocio  I'm calling from [facility] pharmacy regarding mutual patient Pierre [place], date of birth is [id number]  I received 2 escribes on an ondansetron prescription and the one is the orally disintegrating and the other one is just the tablet  They have two sets of different directions  I'm trying to figure out which one you wanted the patient taking  If you can give me a call back at [phone]  Thanks   Bye

## 2023-02-06 NOTE — PLAN OF CARE
Problem: INFECTION - ADULT  Goal: Absence of fever/infection during neutropenic period  Description: INTERVENTIONS:  - Monitor WBC    2/6/2023 1456 by Orlin Reyes RN  Outcome: Progressing  2/6/2023 1456 by Orlin Reyes RN  Outcome: Progressing

## 2023-02-06 NOTE — TELEPHONE ENCOUNTER
Pharmacy called stating they received two scripts for zofran, one a disintegrating tablet and regular oral tablet with two different sets of directions  Pharmacy would like clarification in this

## 2023-02-06 NOTE — PATIENT INSTRUCTIONS
Medicare Preventive Visit Patient Instructions  Thank you for completing your Welcome to Medicare Visit or Medicare Annual Wellness Visit today  Your next wellness visit will be due in one year (2/7/2024)  The screening/preventive services that you may require over the next 5-10 years are detailed below  Some tests may not apply to you based off risk factors and/or age  Screening tests ordered at today's visit but not completed yet may show as past due  Also, please note that scanned in results may not display below  Preventive Screenings:  Service Recommendations Previous Testing/Comments   Colorectal Cancer Screening  * Colonoscopy    * Fecal Occult Blood Test (FOBT)/Fecal Immunochemical Test (FIT)  * Fecal DNA/Cologuard Test  * Flexible Sigmoidoscopy Age: 39-70 years old   Colonoscopy: every 10 years (may be performed more frequently if at higher risk)  OR  FOBT/FIT: every 1 year  OR  Cologuard: every 3 years  OR  Sigmoidoscopy: every 5 years  Screening may be recommended earlier than age 39 if at higher risk for colorectal cancer  Also, an individualized decision between you and your healthcare provider will decide whether screening between the ages of 74-80 would be appropriate  Colonoscopy: 10/05/2022  FOBT/FIT: Not on file  Cologuard: Not on file  Sigmoidoscopy: Not on file    Screening Current     Breast Cancer Screening Age: 36 years old  Frequency: every 1-2 years  Not required if history of left and right mastectomy Mammogram: 10/06/2022    Screening Current   Cervical Cancer Screening Between the ages of 21-29, pap smear recommended once every 3 years  Between the ages of 33-67, can perform pap smear with HPV co-testing every 5 years     Recommendations may differ for women with a history of total hysterectomy, cervical cancer, or abnormal pap smears in past  Pap Smear: Not on file        Hepatitis C Screening Once for adults born between 1945 and 1965  More frequently in patients at high risk for Hepatitis C Hep C Antibody: 12/31/2014    Screening Current   Diabetes Screening 1-2 times per year if you're at risk for diabetes or have pre-diabetes Fasting glucose: 79 mg/dL (3/23/2022)  A1C: 5 4 % (9/1/2022)  Screening Current   Cholesterol Screening Once every 5 years if you don't have a lipid disorder  May order more often based on risk factors  Lipid panel: 03/12/2022    Screening Current     Other Preventive Screenings Covered by Medicare:  1  Abdominal Aortic Aneurysm (AAA) Screening: covered once if your at risk  You're considered to be at risk if you have a family history of AAA  2  Lung Cancer Screening: covers low dose CT scan once per year if you meet all of the following conditions: (1) Age 50-69; (2) No signs or symptoms of lung cancer; (3) Current smoker or have quit smoking within the last 15 years; (4) You have a tobacco smoking history of at least 20 pack years (packs per day multiplied by number of years you smoked); (5) You get a written order from a healthcare provider  3  Glaucoma Screening: covered annually if you're considered high risk: (1) You have diabetes OR (2) Family history of glaucoma OR (3)  aged 48 and older OR (3)  American aged 72 and older  3  Osteoporosis Screening: covered every 2 years if you meet one of the following conditions: (1) You're estrogen deficient and at risk for osteoporosis based off medical history and other findings; (2) Have a vertebral abnormality; (3) On glucocorticoid therapy for more than 3 months; (4) Have primary hyperparathyroidism; (5) On osteoporosis medications and need to assess response to drug therapy  · Last bone density test (DXA Scan): 04/22/2021   5  HIV Screening: covered annually if you're between the age of 15-65  Also covered annually if you are younger than 13 and older than 72 with risk factors for HIV infection   For pregnant patients, it is covered up to 3 times per pregnancy  Immunizations:  Immunization Recommendations   Influenza Vaccine Annual influenza vaccination during flu season is recommended for all persons aged >= 6 months who do not have contraindications   Pneumococcal Vaccine   * Pneumococcal conjugate vaccine = PCV13 (Prevnar 13), PCV15 (Vaxneuvance), PCV20 (Prevnar 20)  * Pneumococcal polysaccharide vaccine = PPSV23 (Pneumovax) Adults 25-60 years old: 1-3 doses may be recommended based on certain risk factors  Adults 72 years old: 1-2 doses may be recommended based off what pneumonia vaccine you previously received   Hepatitis B Vaccine 3 dose series if at intermediate or high risk (ex: diabetes, end stage renal disease, liver disease)   Tetanus (Td) Vaccine - COST NOT COVERED BY MEDICARE PART B Following completion of primary series, a booster dose should be given every 10 years to maintain immunity against tetanus  Td may also be given as tetanus wound prophylaxis  Tdap Vaccine - COST NOT COVERED BY MEDICARE PART B Recommended at least once for all adults  For pregnant patients, recommended with each pregnancy  Shingles Vaccine (Shingrix) - COST NOT COVERED BY MEDICARE PART B  2 shot series recommended in those aged 48 and above     Health Maintenance Due:      Topic Date Due   • Cervical Cancer Screening  Never done   • Breast Cancer Screening: Mammogram  10/06/2023   • Colorectal Cancer Screening  10/05/2025   • HIV Screening  Completed   • Hepatitis C Screening  Completed     Immunizations Due:      Topic Date Due   • Influenza Vaccine (1) 09/01/2022   • COVID-19 Vaccine (5 - Booster for [facility] series) 11/16/2022     Advance Directives   What are advance directives? Advance directives are legal documents that state your wishes and plans for medical care  These plans are made ahead of time in case you lose your ability to make decisions for yourself   Advance directives can apply to any medical decision, such as the treatments you want, and if you want to donate organs  What are the types of advance directives? There are many types of advance directives, and each state has rules about how to use them  You may choose a combination of any of the following:  · Living will: This is a written record of the treatment you want  You can also choose which treatments you do not want, which to limit, and which to stop at a certain time  This includes surgery, medicine, IV fluid, and tube feedings  · Durable power of  for healthcare [facility]): This is a written record that states who you want to make healthcare choices for you when you are unable to make them for yourself  This person, called a proxy, is usually a family member or a friend  You may choose more than 1 proxy  · Do not resuscitate (DNR) order:  A DNR order is used in case your heart stops beating or you stop breathing  It is a request not to have certain forms of treatment, such as CPR  A DNR order may be included in other types of advance directives  · Medical directive: This covers the care that you want if you are in a coma, near death, or unable to make decisions for yourself  You can list the treatments you want for each condition  Treatment may include pain medicine, surgery, blood transfusions, dialysis, IV or tube feedings, and a ventilator (breathing machine)  · Values history: This document has questions about your views, beliefs, and how you feel and think about life  This information can help others choose the care that you would choose  Why are advance directives important? An advance directive helps you control your care  Although spoken wishes may be used, it is better to have your wishes written down  Spoken wishes can be misunderstood, or not followed  Treatments may be given even if you do not want them  An advance directive may make it easier for your family to make difficult choices about your care     Weight Management   Why it is important to manage your weight: Being overweight increases your risk of health conditions such as heart disease, high blood pressure, type 2 diabetes, and certain types of cancer  It can also increase your risk for osteoarthritis, sleep apnea, and other respiratory problems  Aim for a slow, steady weight loss  Even a small amount of weight loss can lower your risk of health problems  How to lose weight safely:  A safe and healthy way to lose weight is to eat fewer calories and get regular exercise  You can lose up about 1 pound a week by decreasing the number of calories you eat by 500 calories each day  Healthy meal plan for weight management:  A healthy meal plan includes a variety of foods, contains fewer calories, and helps you stay healthy  A healthy meal plan includes the following:  · Eat whole-grain foods more often  A healthy meal plan should contain fiber  Fiber is the part of grains, fruits, and vegetables that is not broken down by your body  Whole-grain foods are healthy and provide extra fiber in your diet  Some examples of whole-grain foods are whole-wheat breads and pastas, oatmeal, brown rice, and bulgur  · Eat a variety of vegetables every day  Include dark, leafy greens such as spinach, kale, mercy greens, and mustard greens  Eat yellow and orange vegetables such as carrots, sweet potatoes, and winter squash  · Eat a variety of fruits every day  Choose fresh or canned fruit (canned in its own juice or light syrup) instead of juice  Fruit juice has very little or no fiber  · Eat low-fat dairy foods  Drink fat-free (skim) milk or 1% milk  Eat fat-free yogurt and low-fat cottage cheese  Try low-fat cheeses such as mozzarella and other reduced-fat cheeses  · Choose meat and other protein foods that are low in fat  Choose beans or other legumes such as split peas or lentils  Choose fish, skinless poultry (chicken or turkey), or lean cuts of red meat (beef or pork)   Before you cook meat or poultry, cut off any visible fat    · Use less fat and oil  Try baking foods instead of frying them  Add less fat, such as margarine, sour cream, regular salad dressing and mayonnaise to foods  Eat fewer high-fat foods  Some examples of high-fat foods include french fries, doughnuts, ice cream, and cakes  · Eat fewer sweets  Limit foods and drinks that are high in sugar  This includes candy, cookies, regular soda, and sweetened drinks  Exercise:  Exercise at least 30 minutes per day on most days of the week  Some examples of exercise include walking, biking, dancing, and swimming  You can also fit in more physical activity by taking the stairs instead of the elevator or parking farther away from stores  Ask your healthcare provider about the best exercise plan for you  © Copyright [employer] 2018 Information is for End User's use only and may not be sold, redistributed or otherwise used for commercial purposes  All illustrations and images included in CareNotes® are the copyrighted property of [employer]  or [facility] Preventive Visit Patient Instructions  Thank you for completing your Welcome to Medicare Visit or Medicare Annual Wellness Visit today  Your next wellness visit will be due in one year (2/7/2024)  The screening/preventive services that you may require over the next 5-10 years are detailed below  Some tests may not apply to you based off risk factors and/or age  Screening tests ordered at today's visit but not completed yet may show as past due  Also, please note that scanned in results may not display below    Preventive Screenings:  Service Recommendations Previous Testing/Comments   Colorectal Cancer Screening  * Colonoscopy    * Fecal Occult Blood Test (FOBT)/Fecal Immunochemical Test (FIT)  * Fecal DNA/Cologuard Test  * Flexible Sigmoidoscopy Age: 39-70 years old   Colonoscopy: every 10 years (may be performed more frequently if at higher risk)  OR  FOBT/FIT: every 1 year  OR  Cologuard: every 3 years  OR  Sigmoidoscopy: every 5 years  Screening may be recommended earlier than age 39 if at higher risk for colorectal cancer  Also, an individualized decision between you and your healthcare provider will decide whether screening between the ages of 74-80 would be appropriate  Colonoscopy: 10/05/2022  FOBT/FIT: Not on file  Cologuard: Not on file  Sigmoidoscopy: Not on file    Screening Current     Breast Cancer Screening Age: 36 years old  Frequency: every 1-2 years  Not required if history of left and right mastectomy Mammogram: 10/06/2022    Screening Current   Cervical Cancer Screening Between the ages of 21-29, pap smear recommended once every 3 years  Between the ages of 33-67, can perform pap smear with HPV co-testing every 5 years  Recommendations may differ for women with a history of total hysterectomy, cervical cancer, or abnormal pap smears in past  Pap Smear: Not on file    Screening Not Indicated   Hepatitis C Screening Once for adults born between 1945 and 1965  More frequently in patients at high risk for Hepatitis C Hep C Antibody: 12/31/2014    Screening Current   Diabetes Screening 1-2 times per year if you're at risk for diabetes or have pre-diabetes Fasting glucose: 79 mg/dL (3/23/2022)  A1C: 5 4 % (9/1/2022)  Screening Current   Cholesterol Screening Once every 5 years if you don't have a lipid disorder  May order more often based on risk factors  Lipid panel: 03/12/2022    Screening Current     Other Preventive Screenings Covered by Medicare:  6  Abdominal Aortic Aneurysm (AAA) Screening: covered once if your at risk  You're considered to be at risk if you have a family history of AAA    7  Lung Cancer Screening: covers low dose CT scan once per year if you meet all of the following conditions: (1) Age 50-69; (2) No signs or symptoms of lung cancer; (3) Current smoker or have quit smoking within the last 15 years; (4) You have a tobacco smoking history of at least 20 pack years (packs per day multiplied by number of years you smoked); (5) You get a written order from a healthcare provider  8  Glaucoma Screening: covered annually if you're considered high risk: (1) You have diabetes OR (2) Family history of glaucoma OR (3)  aged 48 and older OR (3)  American aged 72 and older  5  Osteoporosis Screening: covered every 2 years if you meet one of the following conditions: (1) You're estrogen deficient and at risk for osteoporosis based off medical history and other findings; (2) Have a vertebral abnormality; (3) On glucocorticoid therapy for more than 3 months; (4) Have primary hyperparathyroidism; (5) On osteoporosis medications and need to assess response to drug therapy  · Last bone density test (DXA Scan): 04/22/2021  10  HIV Screening: covered annually if you're between the age of 12-76  Also covered annually if you are younger than 13 and older than 72 with risk factors for HIV infection  For pregnant patients, it is covered up to 3 times per pregnancy  Immunizations:  Immunization Recommendations   Influenza Vaccine Annual influenza vaccination during flu season is recommended for all persons aged >= 6 months who do not have contraindications   Pneumococcal Vaccine   * Pneumococcal conjugate vaccine = PCV13 (Prevnar 13), PCV15 (Vaxneuvance), PCV20 (Prevnar 20)  * Pneumococcal polysaccharide vaccine = PPSV23 (Pneumovax) Adults 25-60 years old: 1-3 doses may be recommended based on certain risk factors  Adults 72 years old: 1-2 doses may be recommended based off what pneumonia vaccine you previously received   Hepatitis B Vaccine 3 dose series if at intermediate or high risk (ex: diabetes, end stage renal disease, liver disease)   Tetanus (Td) Vaccine - COST NOT COVERED BY MEDICARE PART B Following completion of primary series, a booster dose should be given every 10 years to maintain immunity against tetanus   Td may also be given as tetanus wound prophylaxis  Tdap Vaccine - COST NOT COVERED BY MEDICARE PART B Recommended at least once for all adults  For pregnant patients, recommended with each pregnancy  Shingles Vaccine (Shingrix) - COST NOT COVERED BY MEDICARE PART B  2 shot series recommended in those aged 48 and above     Health Maintenance Due:      Topic Date Due   • Cervical Cancer Screening  Never done   • Breast Cancer Screening: Mammogram  10/06/2023   • Colorectal Cancer Screening  10/05/2025   • HIV Screening  Completed   • Hepatitis C Screening  Completed     Immunizations Due:      Topic Date Due   • Influenza Vaccine (1) 09/01/2022   • COVID-19 Vaccine (5 - Booster for Damian Jones series) 11/16/2022     Advance Directives   What are advance directives? Advance directives are legal documents that state your wishes and plans for medical care  These plans are made ahead of time in case you lose your ability to make decisions for yourself  Advance directives can apply to any medical decision, such as the treatments you want, and if you want to donate organs  What are the types of advance directives? There are many types of advance directives, and each state has rules about how to use them  You may choose a combination of any of the following:  · Living will: This is a written record of the treatment you want  You can also choose which treatments you do not want, which to limit, and which to stop at a certain time  This includes surgery, medicine, IV fluid, and tube feedings  · Durable power of  for healthcare [facility]): This is a written record that states who you want to make healthcare choices for you when you are unable to make them for yourself  This person, called a proxy, is usually a family member or a friend  You may choose more than 1 proxy  · Do not resuscitate (DNR) order:  A DNR order is used in case your heart stops beating or you stop breathing  It is a request not to have certain forms of treatment, such as CPR   A DNR order may be included in other types of advance directives  · Medical directive: This covers the care that you want if you are in a coma, near death, or unable to make decisions for yourself  You can list the treatments you want for each condition  Treatment may include pain medicine, surgery, blood transfusions, dialysis, IV or tube feedings, and a ventilator (breathing machine)  · Values history: This document has questions about your views, beliefs, and how you feel and think about life  This information can help others choose the care that you would choose  Why are advance directives important? An advance directive helps you control your care  Although spoken wishes may be used, it is better to have your wishes written down  Spoken wishes can be misunderstood, or not followed  Treatments may be given even if you do not want them  An advance directive may make it easier for your family to make difficult choices about your care  Weight Management   Why it is important to manage your weight:  Being overweight increases your risk of health conditions such as heart disease, high blood pressure, type 2 diabetes, and certain types of cancer  It can also increase your risk for osteoarthritis, sleep apnea, and other respiratory problems  Aim for a slow, steady weight loss  Even a small amount of weight loss can lower your risk of health problems  How to lose weight safely:  A safe and healthy way to lose weight is to eat fewer calories and get regular exercise  You can lose up about 1 pound a week by decreasing the number of calories you eat by 500 calories each day  Healthy meal plan for weight management:  A healthy meal plan includes a variety of foods, contains fewer calories, and helps you stay healthy  A healthy meal plan includes the following:  · Eat whole-grain foods more often  A healthy meal plan should contain fiber   Fiber is the part of grains, fruits, and vegetables that is not broken down by your body  Whole-grain foods are healthy and provide extra fiber in your diet  Some examples of whole-grain foods are whole-wheat breads and pastas, oatmeal, brown rice, and bulgur  · Eat a variety of vegetables every day  Include dark, leafy greens such as spinach, kale, mercy greens, and mustard greens  Eat yellow and orange vegetables such as carrots, sweet potatoes, and winter squash  · Eat a variety of fruits every day  Choose fresh or canned fruit (canned in its own juice or light syrup) instead of juice  Fruit juice has very little or no fiber  · Eat low-fat dairy foods  Drink fat-free (skim) milk or 1% milk  Eat fat-free yogurt and low-fat cottage cheese  Try low-fat cheeses such as mozzarella and other reduced-fat cheeses  · Choose meat and other protein foods that are low in fat  Choose beans or other legumes such as split peas or lentils  Choose fish, skinless poultry (chicken or turkey), or lean cuts of red meat (beef or pork)  Before you cook meat or poultry, cut off any visible fat  · Use less fat and oil  Try baking foods instead of frying them  Add less fat, such as margarine, sour cream, regular salad dressing and mayonnaise to foods  Eat fewer high-fat foods  Some examples of high-fat foods include french fries, doughnuts, ice cream, and cakes  · Eat fewer sweets  Limit foods and drinks that are high in sugar  This includes candy, cookies, regular soda, and sweetened drinks  Exercise:  Exercise at least 30 minutes per day on most days of the week  Some examples of exercise include walking, biking, dancing, and swimming  You can also fit in more physical activity by taking the stairs instead of the elevator or parking farther away from stores  Ask your healthcare provider about the best exercise plan for you  © Copyright [employer] 2018 Information is for End User's use only and may not be sold, redistributed or otherwise used for commercial purposes   All illustrations and images included in CareNotes® are the copyrighted property of [employer]  or Douglas Guerra

## 2023-02-06 NOTE — PROGRESS NOTES
Assessment and Plan:     Problem List Items Addressed This Visit        Other    Anaphylactic reaction    Relevant Medications    budesonide (ENTOCORT EC) 3 MG capsule   Other Visit Diagnoses     Medicare annual wellness visit, initial    -  Primary    Nausea        BMI 26 0-26 9,adult               Preventive health issues were discussed with patient, and age appropriate screening tests were ordered as noted in patient's After Visit Summary  Personalized health advice and appropriate referrals for health education or preventive services given if needed, as noted in patient's After Visit Summary  BMI Counseling: Body mass index is 26 75 kg/m²  The patient indicates understanding of these issues and agrees with the plan  Return in 3 months (on 5/6/2023)  History of Present Illness:     Patient presents for a Medicare Wellness Visit  She is also requesting some medication refills      Patient Care Team:  Chano Gibson DO as PCP - General (Family Medicine)  RANDEE Pozo MD as Endoscopist  Dusty Frias MD (Gastroenterology)     Review of Systems:     Review of Systems   All other systems reviewed and are negative         Problem List:     Patient Active Problem List   Diagnosis   • Chondromalacia   • Diarrhea   • Vitamin D deficiency   • Hypokalemia   • Throat tightness   • Abdominal pain   • Headache   • Hypomagnesemia   • Generalized anxiety disorder   • Hypothyroidism (acquired)   • Leukocytosis   • CHF (congestive heart failure) ([facility])   • Fibromyalgia   • 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[facility])   • Meniere's disease   • Chronic idiopathic urticaria   • Disorder of synovium   • Primary localized osteoarthrosis of ankle and foot   • Thrush   • Chronic back pain   • Thrombocytosis   • Proctitis   • Immunosuppressed status ([facility])   • Mass of left axilla   • Constipation   • Mild protein-calorie malnutrition ([facility])   • Anorexia   • Crohn's disease of colon with complication ([facility] )   • Overweight (BMI 25 0-29  9)   • Current chronic use of systemic steroids   • Migraine   • MS (multiple sclerosis) (HCC)   • Inflammatory bowel disease   • Hyponatremia   • Heart palpitations   • Intractable nausea and vomiting   • Anaphylactic reaction   • Nephrolithiasis   • Atrial tachycardia (HCC)   • Immunosuppression due to chronic steroid use [facility])      Past Medical and Surgical History:     Past Medical History:   Diagnosis Date   • Anemia 2007   • Anxiety    • Asthma    • Bile duct stricture 2014    Sphincter of oddi dysfunction   • Chronic kidney disease    • Colon polyp 1998   • Crohn's colitis ([facility] )    • Deep vein thrombosis ([place] [address] )    • Disease of thyroid gland    • 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   • Generalized anxiety disorder 08/26/2017   • GERD (gastroesophageal reflux disease) 1995   • GI (gastrointestinal bleed) 1994   • Heart murmur    • Inflammatory bowel disease    • Irritable bowel syndrome 2016   • Lactose intolerance 1982   • Mast cell disease    • Migraine    • Myocardial infarction [facility])     NSTEMI  pt denies, documented in cardio note   • Pancreatitis     sphinger of    • Psychiatric disorder    • RA (rheumatoid arthritis) ([facility] )    • Seizures ([facility] )    • Ulcerative colitis ([place] [address] )    • Visual impairment      Past Surgical History:   Procedure Laterality Date   • ABDOMINAL SURGERY  2017   • APPENDECTOMY     • CHOLECYSTECTOMY     • COLONOSCOPY  2019   • EGD AND COLONOSCOPY N/A 09/12/2017    Procedure: EGD AND COLONOSCOPY;  Surgeon: Denise Siegel MD;  [facility]: [facility]; Service: Gastroenterology   • ERCP N/A 09/15/2017    Procedure: ENDOSCOPIC RETROGRADE CHOLANGIOPANCREATOGRAPHY (ERCP); Surgeon: Jimy Valle MD;  Location: [facility];   Service: Gastroenterology   • HYSTERECTOMY     • KNEE SURGERY Right    • LAPAROSCOPIC ENDOMETRIOSIS FULGURATION     • ORIF ANKLE FRACTURE Left    • DC ARTHRS KNE SURG W/MENISCECTOMY MED/LAT W/SHVG Left 05/12/2017    Procedure: POSSIBLE MEDIAL MENISECTOMY;  Surgeon: Joselyn Roblero MD;  Location: [facility];  Service: Orthopedics   • ME ARTHRS KNEE DEBRIDEMENT/SHAVING ARTCLR CRTLG Left 05/12/2017    Procedure: KNEE ARTHROSCOPY EVALUATION, CHONDROPLASTY;  Surgeon: Josleyn Roblero MD;  Location: [facility];  Service: Orthopedics   • ME LAPS ABD PRTM&OMENTUM DX W/WO [facility] N/A 12/12/2017    Procedure: LAPAROSCOPY DIAGNOSTIC  WITH LYSIS OF ADHESIONS;  Surgeon: Bam Bynum DO;  Location:  MAIN OR;  Service: General   • UPPER GASTROINTESTINAL ENDOSCOPY  2019      Family History:     Family History   Problem Relation Age of Onset   • Ulcerative colitis Mother    • Arthritis Mother    • Colon polyps Mother    • Heart failure Father    • Neuropathy Father    • Macular degeneration Father    • Diabetes Father    • Early death Father    • Vision loss Father    • Asthma Daughter    • Hypothyroidism Daughter    • Heart disease Maternal Grandmother    • Arthritis Maternal Grandmother    • No Known Problems Maternal Grandfather    • Arthritis Paternal Grandmother    • Macular degeneration Paternal Grandmother    • Vision loss Paternal Grandmother    • Lymphoma Paternal Grandfather    • Cancer Paternal Grandfather    • Early death Paternal Grandfather    • Breast cancer Maternal Aunt    • Cancer Maternal Aunt    • Miscarriages / Stillbirths Maternal Aunt    • Heart failure Paternal Aunt    • Heart failure Paternal Aunt    • Irritable bowel syndrome Paternal [de-identified]    • Breast cancer Paternal Aunt    • Breast cancer additional onset Paternal [de-identified]    • Hypothyroidism Paternal Aunt    • Cancer Paternal Aunt    • No Known Problems Son    • No Known Problems Son    • Kidney disease Brother    • Depression Paternal Uncle    • Early death Paternal Uncle       Social History:     Social History     Socioeconomic History   • Marital status: /Civil Union     Spouse name: None   • Number of children: None   • Years of education: None   • Highest education level: None   Occupational History   • None   Tobacco Use   • Smoking status: Never   • Smokeless tobacco: Never   Vaping Use   • Vaping Use: Never used   Substance and Sexual Activity   • Alcohol use: Never   • Drug use: Never   • Sexual activity: Not Currently     Partners: Male     Birth control/protection: Other     Comment: Full hysterectomy   Other Topics Concern   • None   Social History Narrative   • None     Social Determinants of Health     Financial Resource Strain: Low Risk    • Difficulty of Paying Living Expenses: Not very hard   Food Insecurity: Not on file   Transportation Needs: No Transportation Needs   • Lack of Transportation (Medical): No   • Lack of Transportation (Non-Medical):  No   Physical Activity: Not on file   Stress: Not on file   Social Connections: Not on file   Intimate Partner Violence: Not on file   Housing Stability: Not on file      Medications and Allergies:     Current Outpatient Medications   Medication Sig Dispense Refill   • budesonide (ENTOCORT EC) 3 MG capsule      • BUDESONIDE PO Take 3 mg by mouth daily 3 tabs     • butalbital-acetaminophen-caffeine (FIORICET,ESGIC) -40 mg per tablet Take 1 tablet by mouth every 8 (eight) hours as needed for migraine 20 tablet 0   • cetirizine (ZyrTEC) 10 mg tablet Take 20 mg by mouth daily     • Cholecalciferol 10 MCG /0 025ML LIQD Take 1,000 Units by mouth 2 (two) times a day 750 mL 1   • cyanocobalamin 1,000 mcg/mL      • diphenhydrAMINE (BENADRYL) 25 mg tablet Take 50 mg by mouth 2 (two) times a day     • EPINEPHrine (EpiPen 2-Malachi) 0 3 mg/0 3 mL SOAJ Inject 0 3 mL (0 3 mg total) into a muscle once for 1 dose 0 6 mL 0   • furosemide (LASIX) 20 mg tablet as needed     • hydrOXYzine HCL (ATARAX) 25 mg tablet Take 25 mg by mouth 4 (four) times a day      • Lactobacillus (PROBIOTIC ACIDOPHILUS PO) Take by mouth     • Lactobacillus-Inulin ([facility]) CAPS Take 200 mg by mouth daily     • levothyroxine 100 mcg tablet Take 100 mcg by mouth every other day     • LORazepam (ATIVAN) 1 mg tablet Take 1 tablet (1 mg total) by mouth every 8 (eight) hours as needed for anxiety 21 tablet 0   • NON FORMULARY immunoglobin sq 6g 30 ml once a week     • nystatin (MYCOSTATIN) 500,000 units/5 mL suspension      • omalizumab (XOLAIR) 150 mg Inject 300 mg under the skin every 21 days     • pantoprazole (PROTONIX) 40 mg tablet TAKE ONE TABLET BY MOUTH TWICE A DAY 60 tablet 5   • predniSONE 20 mg tablet Take as instructed by physician   20mg as needed on days with symptoms 100 tablet 0   • Stelara 90 MG/ML subcutaneous injection 90mg inject subcutaneously every 21 days 1 mL 6   • TechLite Lancets MISC      • Alfalfa 650 MG TABS Take by mouth (Patient not taking: Reported on 2/6/2023)     • B-D 3CC LUER-SHAMEKA SYR 25GX1" 25G X 1" 3 ML MISC  (Patient not taking: Reported on 2/6/2023)     • diclofenac (VOLTAREN) 75 mg EC tablet Take 1 tablet (75 mg total) by mouth 2 (two) times a day for 21 days (Patient taking differently: Take 75 mg by mouth as needed) 42 tablet 2   • ergocalciferol (VITAMIN D2) 50,000 units  (Patient not taking: Reported on 11/23/2022)     • famotidine (PEPCID) 40 MG tablet Take 1 tablet (40 mg total) by mouth daily (Patient not taking: Reported on 11/23/2022) 90 tablet 0   • levalbuterol (XOPENEX HFA) 45 mcg/act inhaler Inhale 1-2 puffs every 4 (four) hours as needed for shortness of breath (Patient not taking: Reported on 12/27/2021)     • liothyronine (CYTOMEL) 5 mcg tablet Take 5 mcg by mouth daily (Patient not taking: Reported on 11/23/2022)     • montelukast (SINGULAIR) 10 mg tablet Take 1 tablet (10 mg total) by mouth daily at bedtime (Patient not taking: Reported on 2/6/2023) 30 tablet 0   • Riboflavin (Vitamin B-2) 100 MG TABS Take 4 tablets (400 mg total) by mouth daily 360 tablet 3   • simethicone (MYLICON) 993 MG chewable tablet Chew 125 mg every 6 (six) hours as needed for flatulence (Patient not taking: Reported on 2/6/2023)     • Synthroid 50 MCG tablet Taking 1 5 mcg daily (Patient not taking: Reported on 2/6/2023)     • Synthroid 75 MCG tablet  (Patient not taking: Reported on 2/6/2023)       No current facility-administered medications for this visit       Allergies   Allergen Reactions   • Aimovig [Erenumab-Aooe] Anaphylaxis   • Amitriptyline Anaphylaxis     According to the patient she had nphylaxis   • Aspergillus Allergy Skin Test Other (See Comments), Hives and Swelling   • Azathioprine Other (See Comments) and Anaphylaxis     pancreatitis  According to patient she needed Epipen   • Buspar [Buspirone] Hives and Shortness Of Breath   • Corn Dextrin Anaphylaxis     Any corn based products   • Eggs Or Egg-Derived Products - Food Allergy Anaphylaxis   • Gelatin - Food Allergy Anaphylaxis   • Heparin Hives     Painful welts    • Lactated Ringers Shortness Of Breath     Pt questions this allergy, pt has received LR multiple times without reaction   • Lavender Oil Anaphylaxis     Other reaction(s): anaphalxis   • Malto Dextrin [Dextrin] Anaphylaxis   • Other Anaphylaxis     Gel caps, maltodextrose, dextrose,grapes, lavender    • Penicillium Notatum Shortness Of Breath and Swelling   • Sumatriptan Anaphylaxis     Bradycardia, sob, back pressure, head pain,throat tightness  According to the patient she had anphylaxis   • Contrast [Iodinated Contrast Media] Other (See Comments)     Pt states needs to be premedicated prior to injection   • Corn Oil - Food Allergy - Food Allergy Hives   • Humira [Adalimumab] Other (See Comments)     "I develop drug induced lupus"   • Ibuprofen Hives and Throat Swelling   • Polyethylene Glycol Diarrhea and Vomiting   • Red Dye - Food Allergy Other (See Comments)     intolerance   • Remicade [Infliximab] Other (See Comments)     "I develop drug induced lupus"   • Sulfa Antibiotics Hives and Other (See Comments)   • Sulfate Hives   • Sulfites - Food Allergy Hives   • Chlorhexidine Itching   • Histamine Hives, Rash and Other (See Comments)     Intolerance        Immunizations:     Immunization History   Administered Date(s) Administered   • COVID-19 MODERNA VACC 0 5 ML IM 03/26/2021, 04/28/2021   • COVID-19 Moderna Vac BIVALENT 12 Yr+ IM (BOOSTER ONLY) 0 5 ML 09/21/2022   • DTaP 07/04/2007, 07/23/2015, 08/17/2018   • Hep B, adult 03/01/2018, 03/01/2018, 04/04/2018, 04/04/2018, 09/05/2018, 09/05/2018   • INFLUENZA 11/15/2016   • Influenza, seasonal, injectable 11/15/2016   • Pneumococcal Polysaccharide PPV23 09/25/2018, 09/25/2018   • Td (adult), Unspecified 08/17/2018   • Tetanus, adsorbed 07/04/2007      Health Maintenance:         Topic Date Due   • Cervical Cancer Screening  02/06/2024 (Originally 2/21/1996)   • Breast Cancer Screening: Mammogram  10/06/2023   • Colorectal Cancer Screening  10/05/2025   • HIV Screening  Completed   • Hepatitis C Screening  Completed         Topic Date Due   • COVID-19 Vaccine (5 - Booster for Youssef Reap series) 11/16/2022      Medicare Screening Tests and Risk Assessments:     Janett Schultz is here for her Initial Wellness visit  Health Risk Assessment:   Patient rates overall health as fair  Patient feels that their physical health rating is slightly better  Patient is dissatisfied with their life  Eyesight was rated as slightly worse  Hearing was rated as same  Patient feels that their emotional and mental health rating is same  Patients states they are sometimes angry  Patient states they are often unusually tired/fatigued  Pain experienced in the last 7 days has been a lot  Patient's pain rating has been 8/10  Patient states that she has experienced no weight loss or gain in last 6 months  Fall Risk Screening: In the past year, patient has experienced: no history of falling in past year      Urinary Incontinence Screening:   Patient has not leaked urine accidently in the last six months       Home Safety:  Patient does not have trouble with stairs inside or outside of their home  Patient has working smoke alarms and has working carbon monoxide detector  Home safety hazards include: none  Nutrition:   Current diet is Other (please comment)  Restricted corn allergy    Medications:   Patient is currently taking over-the-counter supplements  OTC medications include: see medication list  Patient is able to manage medications  Activities of Daily Living (ADLs)/Instrumental Activities of Daily Living (IADLs):   Walk and transfer into and out of bed and chair?: Yes  Dress and groom yourself?: Yes    Bathe or shower yourself?: Yes    Feed yourself? Yes  Do your laundry/housekeeping?: Yes  Manage your money, pay your bills and track your expenses?: Yes  Make your own meals?: Yes    Do your own shopping?: Yes    Previous Hospitalizations:   Any hospitalizations or ED visits within the last 12 months?: Yes    How many hospitalizations have you had in the last year?: 1-2    Advance Care Planning:   Living will: No    Durable POA for healthcare: No    Advanced directive: No      Comments: Patient states that she has [employer] information at home    PREVENTIVE SCREENINGS      Cardiovascular Screening:    General: Screening Current      Diabetes Screening:     General: Screening Current      Colorectal Cancer Screening:     General: Screening Current      Breast Cancer Screening:     General: Screening Current      Cervical Cancer Screening:    General: Screening Not Indicated      Abdominal Aortic Aneurysm (AAA) Screening:        General: Screening Not Indicated      Lung Cancer Screening:     General: Screening Not Indicated      Hepatitis C Screening:    General: Screening Current    Screening, Brief Intervention, and Referral to Treatment (SBIRT)    Screening  Typical number of drinks in a day: 0  Typical number of drinks in a week: 0  Interpretation: Low risk drinking behavior      Single Item Drug Screening:  How often have you used an illegal drug (including marijuana) or a prescription medication for non-medical reasons in the past year? never    Single Item Drug Screen Score: 0  Interpretation: Negative screen for possible drug use disorder    No results found  Physical Exam:     /80 (BP Location: Left arm, Patient Position: Sitting, Cuff Size: Standard)   Pulse (!) 115   Temp 99 4 °F (37 4 °C) (Tympanic)   Resp 20   Ht 5' 3" (1 6 m)   Wt 68 5 kg (151 lb)   SpO2 98%   BMI 26 75 kg/m²     Physical Exam  Vitals and nursing note reviewed  Constitutional:       General: She is not in acute distress  HENT:      Head: Normocephalic and atraumatic  Eyes:      Extraocular Movements: Extraocular movements intact  Cardiovascular:      Rate and Rhythm: Tachycardia present  Pulses: Normal pulses  Heart sounds: Normal heart sounds  No murmur heard  No gallop  Pulmonary:      Effort: Pulmonary effort is normal  No respiratory distress  Breath sounds: Normal breath sounds  No wheezing, rhonchi or rales  Neurological:      Mental Status: She is alert and oriented to person, place, and time  Mental status is at baseline     Psychiatric:         Mood and Affect: Mood normal          Behavior: Behavior normal           Bard Cos, DO

## 2023-02-08 LAB — PHYTONADIONE SERPL-MCNC: 1.68 NG/ML (ref 0.1–2.2)

## 2023-02-08 NOTE — TELEPHONE ENCOUNTER
Called pt and left msg on VM to return call to office to discuss appt with Dr Korin Mustafa  Pt to be advised Dr Korin Mustafa only does surgery in [facility] and only sees pts in [facility]  Pt needs an H&P appt for surgery which is usually done by an AP  She is currently scheduled on 2/15/23 with PA-C to discuss CT results and surgery  Dr Korin Mustafa has an opening tomorrow if she wishes to speak to him

## 2023-02-09 NOTE — TELEPHONE ENCOUNTER
Spoke to pt  She only wants to see Dr Candida Ruvalcaba to discuss CT results and poss cysto in OR  Advised his next appt is May 3, 2023  Pt 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 an appt on 2/15/23 with RANDEE for this purpose  Forwarding to Dr Candida Ruvalcaba to advise timeframe for pt to be seen    She was told Dr Candida Ruvalcaba does not do surgery at [facility]

## 2023-02-10 NOTE — TELEPHONE ENCOUNTER
Called pt and left msg on VM per comm consent that she has an appt with Dr Karyle Priestly on Wed 2/15/23 at 3:30 to discuss CT and poss surgery  She was asked to call office back to confirm this appt  Pt in that slot will need to be rescheduled if she accepts this appt

## 2023-02-13 ENCOUNTER — HOSPITAL ENCOUNTER (OUTPATIENT)
Dept: INFUSION CENTER | Facility: HOSPITAL | Age: 48
Discharge: HOME | End: 2023-02-13
Attending: INTERNAL MEDICINE

## 2023-02-13 VITALS
OXYGEN SATURATION: 99 % | SYSTOLIC BLOOD PRESSURE: 124 MMHG | DIASTOLIC BLOOD PRESSURE: 80 MMHG | RESPIRATION RATE: 18 BRPM | HEART RATE: 88 BPM

## 2023-02-13 DIAGNOSIS — E44.1 MILD PROTEIN-CALORIE MALNUTRITION (HCC): ICD-10-CM

## 2023-02-13 DIAGNOSIS — R19.7 DIARRHEA, UNSPECIFIED TYPE: ICD-10-CM

## 2023-02-13 DIAGNOSIS — R63.0 ANOREXIA: Primary | ICD-10-CM

## 2023-02-13 RX ORDER — METHYLPREDNISOLONE SODIUM SUCCINATE 125 MG/2ML
60 INJECTION, POWDER, LYOPHILIZED, FOR SOLUTION INTRAMUSCULAR; INTRAVENOUS ONCE
Status: CANCELLED
Start: 2023-02-14 | End: 2023-02-14

## 2023-02-13 RX ORDER — METHYLPREDNISOLONE SODIUM SUCCINATE 125 MG/2ML
60 INJECTION, POWDER, LYOPHILIZED, FOR SOLUTION INTRAMUSCULAR; INTRAVENOUS ONCE
Status: COMPLETED | OUTPATIENT
Start: 2023-02-13 | End: 2023-02-13

## 2023-02-13 RX ADMIN — METHYLPREDNISOLONE SODIUM SUCCINATE 60 MG: 125 INJECTION, POWDER, FOR SOLUTION INTRAMUSCULAR; INTRAVENOUS at 11:23

## 2023-02-13 RX ADMIN — SODIUM CHLORIDE 1000 ML: 0.9 INJECTION, SOLUTION INTRAVENOUS at 11:06

## 2023-02-13 RX ADMIN — DIPHENHYDRAMINE HYDROCHLORIDE 50 MG: 50 INJECTION, SOLUTION INTRAMUSCULAR; INTRAVENOUS at 11:26

## 2023-02-13 NOTE — PLAN OF CARE
Problem: Potential for Falls  Goal: Patient will remain free of falls  Description: INTERVENTIONS:  - Educate patient/family on patient safety including physical limitations  - Instruct patient to call for assistance with activity   - Consult OT/PT to assist with strengthening/mobility   - Keep Call bell within reach  - Keep bed low and locked with side rails adjusted as appropriate  -    - Consider moving patient to room near nurses station  Outcome: Progressing

## 2023-02-14 ENCOUNTER — TELEPHONE (OUTPATIENT)
Dept: UROLOGY | Facility: MEDICAL CENTER | Age: 48
End: 2023-02-14

## 2023-02-15 ENCOUNTER — OFFICE VISIT (OUTPATIENT)
Dept: UROLOGY | Facility: MEDICAL CENTER | Age: 48
End: 2023-02-15

## 2023-02-15 VITALS
WEIGHT: 145 LBS | SYSTOLIC BLOOD PRESSURE: 120 MMHG | HEIGHT: 63 IN | BODY MASS INDEX: 25.69 KG/M2 | OXYGEN SATURATION: 100 % | HEART RATE: 84 BPM | DIASTOLIC BLOOD PRESSURE: 88 MMHG

## 2023-02-15 DIAGNOSIS — G35 MS (MULTIPLE SCLEROSIS) (HCC): ICD-10-CM

## 2023-02-15 DIAGNOSIS — R31.0 GROSS HEMATURIA: ICD-10-CM

## 2023-02-15 DIAGNOSIS — N20.0 BILATERAL KIDNEY STONES: Primary | ICD-10-CM

## 2023-02-15 DIAGNOSIS — R31.29 MICROSCOPIC HEMATURIA: ICD-10-CM

## 2023-02-15 LAB
SL AMB  POCT GLUCOSE, UA: ABNORMAL
SL AMB LEUKOCYTE ESTERASE,UA: ABNORMAL
SL AMB POCT BILIRUBIN,UA: ABNORMAL
SL AMB POCT BLOOD,UA: ABNORMAL
SL AMB POCT CLARITY,UA: CLEAR
SL AMB POCT COLOR,UA: YELLOW
SL AMB POCT KETONES,UA: ABNORMAL
SL AMB POCT NITRITE,UA: ABNORMAL
SL AMB POCT PH,UA: 5.5
SL AMB POCT SPECIFIC GRAVITY,UA: 1.02
SL AMB POCT URINE PROTEIN: ABNORMAL
SL AMB POCT UROBILINOGEN: 0.2

## 2023-02-15 NOTE — PROGRESS NOTES
Assessment/Plan:  #1   Bilateral renal stones  #2  History of gross hematuria  #3  Microscopic hematuria  No problem-specific Assessment & Plan notes found for this encounter  Diagnoses and all orders for this visit:    Bilateral kidney stones  -     POCT urine dip auto non-scope    Gross hematuria  -     POCT urine dip auto non-scope    Microscopic hematuria          Subjective:      Patient ID: Rosalba Ricci is a 50 y o  female  HPI  77-year-old female with mast cell syndrome presents for cystoscopy because of past history of gross hematuria and chronic microscopic hematuria  CT scanning revealed bilateral nonobstructing punctate calculi with a hypodensity in the upper pole the right kidney too small to characterize and the urinary bladder without focal wall thickening or stones  Because of the patient's immunological reactivity cystoscopy will be performed under anesthesia in the operating room  The patient is aware of the possibility of bleeding infection stricturing perforation or immunologic reaction including anaphylaxis  The following portions of the patient's history were reviewed and updated as appropriate: allergies, current medications, past family history, past medical history, past social history, past surgical history and problem list     Review of Systems   All other systems reviewed and are negative  Objective:      /88 (BP Location: Left arm, Patient Position: Sitting)   Pulse 84   Ht 5' 3" (1 6 m)   Wt 65 8 kg (145 lb)   SpO2 100%   BMI 25 69 kg/m²          Physical Exam  Vitals reviewed  Constitutional:       General: She is not in acute distress  Appearance: Normal appearance  She is not ill-appearing, toxic-appearing or diaphoretic  HENT:      Head: Normocephalic and atraumatic  Nose: Nose normal       Mouth/Throat:      Mouth: Mucous membranes are moist    Eyes:      Extraocular Movements: Extraocular movements intact     Pulmonary:      Effort: Pulmonary effort is normal  No respiratory distress  Abdominal:      General: There is no distension  Palpations: Abdomen is soft  Tenderness: There is no abdominal tenderness  There is no guarding  Musculoskeletal:      Cervical back: Neck supple  Skin:     General: Skin is dry  Neurological:      Mental Status: She is alert and oriented to person, place, and time  Psychiatric:         Mood and Affect: Mood normal          Behavior: Behavior normal          Thought Content:  Thought content normal          Judgment: Judgment normal

## 2023-02-15 NOTE — H&P
Assessment/Plan:  #1   Bilateral renal stones  #2  History of gross hematuria  #3  Microscopic hematuria  No problem-specific Assessment & Plan notes found for this encounter  Diagnoses and all orders for this visit:    Bilateral kidney stones  -     POCT urine dip auto non-scope    Gross hematuria  -     POCT urine dip auto non-scope    Microscopic hematuria         Subjective:     Patient ID: Tate Zambrano is a 50 y o  female  HPI  12-year-old female with mast cell syndrome presents for cystoscopy because of past history of gross hematuria and chronic microscopic hematuria  CT scanning revealed bilateral nonobstructing punctate calculi with a hypodensity in the upper pole the right kidney too small to characterize and the urinary bladder without focal wall thickening or stones  Because of the patient's immunological reactivity cystoscopy will be performed under anesthesia in the operating room  The patient is aware of the possibility of bleeding infection stricturing perforation or immunologic reaction including anaphylaxis  The following portions of the patient's history were reviewed and updated as appropriate: allergies, current medications, past family history, past medical history, past social history, past surgical history and problem list     Review of Systems   All other systems reviewed and are negative  Objective:      /88 (BP Location: Left arm, Patient Position: Sitting)   Pulse 84   Ht 5' 3" (1 6 m)   Wt 65 8 kg (145 lb)   SpO2 100%   BMI 25 69 kg/m²         Physical Exam  Vitals reviewed  Constitutional:       General: She is not in acute distress  Appearance: Normal appearance  She is not ill-appearing, toxic-appearing or diaphoretic  HENT:      Head: Normocephalic and atraumatic  Nose: Nose normal       Mouth/Throat:      Mouth: Mucous membranes are moist    Eyes:      Extraocular Movements: Extraocular movements intact     Pulmonary:      Effort: Pulmonary effort is normal  No respiratory distress  Abdominal:      General: There is no distension  Palpations: Abdomen is soft  Tenderness: There is no abdominal tenderness  There is no guarding  Musculoskeletal:      Cervical back: Neck supple  Skin:     General: Skin is dry  Neurological:      Mental Status: She is alert and oriented to person, place, and time  Psychiatric:         Mood and Affect: Mood normal          Behavior: Behavior normal          Thought Content:  Thought content normal          Judgment: Judgment normal

## 2023-02-16 ENCOUNTER — VBI (OUTPATIENT)
Dept: ADMINISTRATIVE | Facility: OTHER | Age: 48
End: 2023-02-16

## 2023-02-17 ENCOUNTER — HOSPITAL ENCOUNTER (OUTPATIENT)
Dept: INFUSION CENTER | Facility: HOSPITAL | Age: 48
End: 2023-02-17
Attending: INTERNAL MEDICINE

## 2023-02-20 ENCOUNTER — OFFICE VISIT (OUTPATIENT)
Dept: CARDIOLOGY CLINIC | Facility: HOSPITAL | Age: 48
End: 2023-02-20

## 2023-02-20 ENCOUNTER — APPOINTMENT (OUTPATIENT)
Dept: LAB | Facility: HOSPITAL | Age: 48
End: 2023-02-20

## 2023-02-20 VITALS
WEIGHT: 157 LBS | HEART RATE: 87 BPM | BODY MASS INDEX: 27.82 KG/M2 | SYSTOLIC BLOOD PRESSURE: 118 MMHG | DIASTOLIC BLOOD PRESSURE: 90 MMHG | HEIGHT: 63 IN

## 2023-02-20 DIAGNOSIS — E03.2 HYPOTHYROIDISM DUE TO MEDICAMENTS AND OTHER EXOGENOUS SUBSTANCES: ICD-10-CM

## 2023-02-20 DIAGNOSIS — R00.2 HEART PALPITATIONS: ICD-10-CM

## 2023-02-20 DIAGNOSIS — M79.7 FIBROMYALGIA: ICD-10-CM

## 2023-02-20 DIAGNOSIS — G35 MS (MULTIPLE SCLEROSIS) (HCC): ICD-10-CM

## 2023-02-20 DIAGNOSIS — D89.49 OTHER MAST CELL ACTIVATION DISORDER (HCC): ICD-10-CM

## 2023-02-20 DIAGNOSIS — E05.90 THYROTOXICOSIS WITHOUT THYROID STORM, UNSPECIFIED THYROTOXICOSIS TYPE: ICD-10-CM

## 2023-02-20 DIAGNOSIS — E64.2 SEQUELAE OF VITAMIN C DEFICIENCY: ICD-10-CM

## 2023-02-20 DIAGNOSIS — I50.30 DIASTOLIC CONGESTIVE HEART FAILURE, UNSPECIFIED HF CHRONICITY (HCC): ICD-10-CM

## 2023-02-20 DIAGNOSIS — R53.82 CHRONIC FATIGUE: ICD-10-CM

## 2023-02-20 DIAGNOSIS — I47.1 ATRIAL TACHYCARDIA (HCC): Primary | ICD-10-CM

## 2023-02-20 DIAGNOSIS — D89.40 MAST CELL ACTIVATION SYNDROME (HCC): ICD-10-CM

## 2023-02-20 LAB
BASOPHILS # BLD AUTO: 0.15 THOUSANDS/ÂΜL (ref 0–0.1)
BASOPHILS NFR BLD AUTO: 2 % (ref 0–1)
EOSINOPHIL # BLD AUTO: 0.12 THOUSAND/ÂΜL (ref 0–0.61)
EOSINOPHIL NFR BLD AUTO: 2 % (ref 0–6)
ERYTHROCYTE [DISTWIDTH] IN BLOOD BY AUTOMATED COUNT: 14.8 % (ref 11.6–15.1)
HCT VFR BLD AUTO: 42.3 % (ref 34.8–46.1)
HGB BLD-MCNC: 14.3 G/DL (ref 11.5–15.4)
IMM GRANULOCYTES # BLD AUTO: 0.02 THOUSAND/UL (ref 0–0.2)
IMM GRANULOCYTES NFR BLD AUTO: 0 % (ref 0–2)
LYMPHOCYTES # BLD AUTO: 1.78 THOUSANDS/ÂΜL (ref 0.6–4.47)
LYMPHOCYTES NFR BLD AUTO: 26 % (ref 14–44)
MCH RBC QN AUTO: 30.6 PG (ref 26.8–34.3)
MCHC RBC AUTO-ENTMCNC: 33.8 G/DL (ref 31.4–37.4)
MCV RBC AUTO: 90 FL (ref 82–98)
MONOCYTES # BLD AUTO: 0.64 THOUSAND/ÂΜL (ref 0.17–1.22)
MONOCYTES NFR BLD AUTO: 9 % (ref 4–12)
NEUTROPHILS # BLD AUTO: 4.09 THOUSANDS/ÂΜL (ref 1.85–7.62)
NEUTS SEG NFR BLD AUTO: 61 % (ref 43–75)
NRBC BLD AUTO-RTO: 0 /100 WBCS
PLATELET # BLD AUTO: 421 THOUSANDS/UL (ref 149–390)
PMV BLD AUTO: 9.3 FL (ref 8.9–12.7)
RBC # BLD AUTO: 4.68 MILLION/UL (ref 3.81–5.12)
WBC # BLD AUTO: 6.8 THOUSAND/UL (ref 4.31–10.16)

## 2023-02-20 NOTE — PROGRESS NOTES
Cardiology Follow Up    Devyn Daily  1975  [id number]  [address] [place] [address]    1  Atrial tachycardia (HCC)  POCT ECG      2  Diastolic congestive heart failure, unspecified HF chronicity ([facility] )        3  MS (multiple sclerosis) ([facility] )        4  Fibromyalgia        5  Heart palpitations        6  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HCC)            Discussion/Summary:   Overall she has been doing well from a cardiac standpoint  Recent echocardiogram shows preserved LV function  Holter monitor showed limited ectopic beats  Blood pressure on manual recheck 122/84  Continue current medications no further testing at this time we will see her back in 1 year  Interval History:  43-year-old female I met in the hospital from iatrogenic fluid overload  Overall she has been doing well from a cardiac standpoint  We ordered a echocardiogram last year which showed normal diastolic parameters and stress echo showed no evidence of ischemia  She has 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  From a cardiac standpoint she has no anginal symptoms  She has no lower extremity edema, PND, orthopnea  His been no palpitations  Overall she has been doing well from a cardiac standpoint palpitations have been minimal she watches her allergies very closely  Denies any exertional chest pain or discomfort  Echocardiogram last year shows preserved LV systolic function Holter monitor was unremarkable  Denies any swelling in the legs  There is been no syncope      Problem List     Chondromalacia    Crohn's colitis ([facility] )    Ulcerative colitis ([facility] )    Vitamin D deficiency    Hypokalemia    Hypotension    Throat tightness    Septic shock (HCC)    Bradycardia    Abdominal pain    Headache    Hypophosphatemia    Hypomagnesemia    Generalized anxiety disorder (Chronic)    Hypothyroidism (Chronic)    Low TSH level    Hypocalcemia    Hyperglycemia    Acute respiratory failure with hypoxia (HCC)    Bilateral pleural effusion    Acute cardiogenic pulmonary edema (HCC)    Elevated troponin level    Abnormal CT of the abdomen    Leukocytosis    Arm swelling    Thrombophlebitis    Dysuria    Anxiety    CHF (congestive heart failure) (HCC)    Fibromyalgia    Mast cell disorder    Meniere disease    Visual disturbance    Chronic idiopathic urticaria    Disorder of synovium        Past Medical History:   Diagnosis Date   • Anemia 2007   • Anxiety    • Asthma    • Bile duct stricture 2014    Sphincter of oddi dysfunction   • Chronic kidney disease    • Colon polyp 1998   • Crohn's colitis ([facility] )    • Deep vein thrombosis ([facility] )    • Disease of thyroid gland    • 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   • Generalized anxiety disorder 08/26/2017   • GERD (gastroesophageal reflux disease) 1995   • GI (gastrointestinal bleed) 1994   • Heart murmur    • Inflammatory bowel disease    • Irritable bowel syndrome 2016   • Lactose intolerance 1982   • Mast cell disease    • Migraine    • Myocardial infarction ([facility] )     NSTEMI  pt denies, documented in cardio note   • Pancreatitis     sphinger of    • Psychiatric disorder    • RA (rheumatoid arthritis) ([facility] )    • Seizures ([facility] )    • Ulcerative colitis ([facility] )    • Visual impairment      Social History     Socioeconomic History   • Marital status: /Civil Union     Spouse name: Not on file   • Number of children: Not on file   • Years of education: Not on file   • Highest education level: Not on file   Occupational History   • Not on file   Tobacco Use   • Smoking status: Never   • Smokeless tobacco: Never   Vaping Use   • Vaping Use: Never used   Substance and Sexual Activity   • Alcohol use: Never   • Drug use: Never   • Sexual activity: Not Currently     Partners: Male     Birth control/protection: Other     Comment: Full hysterectomy   Other Topics Concern   • Not on file   Social History Narrative   • Not on file     Social Determinants of Health     Financial Resource Strain: Low Risk    • Difficulty of Paying Living Expenses: Not very hard   Food Insecurity: Not on file   Transportation Needs: No Transportation Needs   • Lack of Transportation (Medical): No   • Lack of Transportation (Non-Medical):  No   Physical Activity: Not on file   Stress: Not on file   Social Connections: Not on file   Intimate Partner Violence: Not on file   Housing Stability: Not on file      Family History   Problem Relation Age of Onset   • Ulcerative colitis Mother    • Arthritis Mother    • Colon polyps Mother    • Heart failure Father    • Neuropathy Father    • Macular degeneration Father    • Diabetes Father    • Early death Father    • Vision loss Father    • Asthma Daughter    • Hypothyroidism Daughter    • Heart disease Maternal Grandmother    • Arthritis Maternal Grandmother    • No Known Problems Maternal Grandfather    • Arthritis Paternal Grandmother    • Macular degeneration Paternal Grandmother    • Vision loss Paternal Grandmother    • Lymphoma Paternal Grandfather    • Cancer Paternal Grandfather    • Early death Paternal Grandfather    • Breast cancer Maternal Aunt    • Cancer Maternal Aunt    • Miscarriages / Stillbirths Maternal Aunt    • Heart failure Paternal Aunt    • Heart failure Paternal Aunt    • Irritable bowel syndrome Paternal [de-identified]    • Breast cancer Paternal Aunt    • Breast cancer additional onset Paternal [de-identified]    • Hypothyroidism Paternal Aunt    • Cancer Paternal Aunt    • No Known Problems Son    • No Known Problems Son    • Kidney disease Brother    • Depression Paternal Uncle    • Early death Paternal Uncle      Past Surgical History:   Procedure Laterality Date   • ABDOMINAL SURGERY  2017   • APPENDECTOMY     • CHOLECYSTECTOMY     • COLONOSCOPY  2019   • EGD AND COLONOSCOPY N/A 09/12/2017    Procedure: EGD AND COLONOSCOPY;  Surgeon: Chay Crenshaw MD;  Location: [facility]; Service: Gastroenterology   • ERCP N/A 09/15/2017    Procedure: ENDOSCOPIC RETROGRADE CHOLANGIOPANCREATOGRAPHY (ERCP); Surgeon: Josué Schultz MD;  Location:  GI LAB;   Service: Gastroenterology   • HYSTERECTOMY     • KNEE SURGERY Right    • LAPAROSCOPIC ENDOMETRIOSIS FULGURATION     • ORIF ANKLE FRACTURE Left    • OK ARTHRS KNE SURG W/MENISCECTOMY MED/LAT W/SHVG Left 05/12/2017    Procedure: POSSIBLE MEDIAL MENISECTOMY;  Surgeon: Nancy Hutchinson MD;  Location: [facility];  Service: Orthopedics   • OK ARTHRS KNEE DEBRIDEMENT/SHAVING ARTCLR CRTLG Left 05/12/2017    Procedure: KNEE ARTHROSCOPY EVALUATION, CHONDROPLASTY;  Surgeon: Nancy Hutchinson MD;  Location: [facility];  Service: Orthopedics   • OK LAPS ABD PRTM&OMENTUM DX W/WO SPEC BR/WA SPX N/A 12/12/2017    Procedure: LAPAROSCOPY DIAGNOSTIC  WITH LYSIS OF ADHESIONS;  Surgeon: Subha Esquivel DO;  Location:  MAIN OR;  Service: General   • UPPER GASTROINTESTINAL ENDOSCOPY  2019       Current Outpatient Medications:   •  budesonide (ENTOCORT EC) 3 MG capsule, , Disp: , Rfl:   •  BUDESONIDE PO, Take 3 mg by mouth daily 3 tabs, Disp: , Rfl:   •  butalbital-acetaminophen-caffeine (FIORICET,ESGIC) -40 mg per tablet, Take 1 tablet by mouth every 8 (eight) hours as needed for migraine, Disp: 20 tablet, Rfl: 0  •  cetirizine (ZyrTEC) 10 mg tablet, Take 20 mg by mouth daily, Disp: , Rfl:   •  Cholecalciferol 10 MCG /0 025ML LIQD, Take 1,000 Units by mouth 2 (two) times a day, Disp: 750 mL, Rfl: 1  •  cyanocobalamin 1,000 mcg/mL, , Disp: , Rfl:   •  diclofenac (VOLTAREN) 75 mg EC tablet, Take 1 tablet (75 mg total) by mouth 2 (two) times a day for 21 days (Patient taking differently: Take 75 mg by mouth as needed), Disp: 42 tablet, Rfl: 2  •  diphenhydrAMINE (BENADRYL) 25 mg tablet, Take 50 mg by mouth 2 (two) times a day, Disp: , Rfl:   •  EPINEPHrine (EpiPen 2-Malachi) 0 3 mg/0 3 mL SOAJ, Inject 0 3 mL (0 3 mg total) into a muscle once for 1 dose, Disp: 0 6 mL, Rfl: 0  •  furosemide (LASIX) 20 mg tablet, as needed, Disp: , Rfl:   •  hydrOXYzine HCL (ATARAX) 25 mg tablet, Take 25 mg by mouth 4 (four) times a day , Disp: , Rfl:   •  Lactobacillus (PROBIOTIC ACIDOPHILUS PO), Take by mouth, Disp: , Rfl:   •  Lactobacillus-Inulin ([facility]) CAPS, Take 200 mg by mouth daily, Disp: , Rfl:   •  levothyroxine 100 mcg tablet, Take 100 mcg by mouth every other day, Disp: , Rfl:   •  LORazepam (ATIVAN) 1 mg tablet, Take 1 tablet (1 mg total) by mouth every 8 (eight) hours as needed for anxiety, Disp: 21 tablet, Rfl: 0  •  NON FORMULARY, immunoglobin sq 6g 30 ml once a week, Disp: , Rfl:   •  nystatin (MYCOSTATIN) 500,000 units/5 mL suspension, , Disp: , Rfl:   •  omalizumab (XOLAIR) 150 mg, Inject 300 mg under the skin every 21 days, Disp: , Rfl:   •  pantoprazole (PROTONIX) 40 mg tablet, TAKE ONE TABLET BY MOUTH TWICE A DAY, Disp: 60 tablet, Rfl: 5  •  predniSONE 20 mg tablet, Take as instructed by physician   20mg as needed on days with symptoms, Disp: 100 tablet, Rfl: 0  •  Riboflavin (Vitamin B-2) 100 MG TABS, Take 4 tablets (400 mg total) by mouth daily, Disp: 360 tablet, Rfl: 3  •  Stelara 90 MG/ML subcutaneous injection, 90mg inject subcutaneously every 21 days, Disp: 1 mL, Rfl: 6  •  TechLite Lancets MISC, , Disp: , Rfl:   •  Alfalfa 650 MG TABS, Take by mouth (Patient not taking: Reported on 2/6/2023), Disp: , Rfl:   •  B-D 3CC LUER-SHAMEKA SYR 25GX1" 25G X 1" 3 ML MISC, , Disp: , Rfl:   •  ergocalciferol (VITAMIN D2) 50,000 units, , Disp: , Rfl:   •  famotidine (PEPCID) 40 MG tablet, Take 1 tablet (40 mg total) by mouth daily (Patient not taking: Reported on 11/23/2022), Disp: 90 tablet, Rfl: 0  •  levalbuterol (XOPENEX HFA) 45 mcg/act inhaler, Inhale 1-2 puffs every 4 (four) hours as needed for shortness of breath (Patient not taking: Reported on 12/27/2021), Disp: , Rfl:   •  liothyronine (CYTOMEL) 5 mcg tablet, Take 5 mcg by mouth daily (Patient not taking: Reported on 11/23/2022), Disp: , Rfl:   •  montelukast (SINGULAIR) 10 mg tablet, Take 1 tablet (10 mg total) by mouth daily at bedtime (Patient not taking: Reported on 2/6/2023), Disp: 30 tablet, Rfl: 0  •  simethicone (MYLICON) 029 MG chewable tablet, Chew 125 mg every 6 (six) hours as needed for flatulence (Patient not taking: Reported on 2/6/2023), Disp: , Rfl:   •  Synthroid 50 MCG tablet, Taking 1 5 mcg daily (Patient not taking: Reported on 2/6/2023), Disp: , Rfl:   •  Synthroid 75 MCG tablet, , Disp: , Rfl:   Allergies   Allergen Reactions   • Aimovig [Erenumab-Aooe] Anaphylaxis   • Amitriptyline Anaphylaxis     According to the patient she had nphylaxis   • Aspergillus Allergy Skin Test Other (See Comments), Hives and Swelling   • Azathioprine Other (See Comments) and Anaphylaxis     pancreatitis  According to patient she needed Epipen   • Buspar [Buspirone] Hives and Shortness Of Breath   • Corn Dextrin Anaphylaxis     Any corn based products   • Eggs Or Egg-Derived Products - Food Allergy Anaphylaxis   • Gelatin - Food Allergy Anaphylaxis   • Heparin Hives     Painful welts    • Lactated Ringers Shortness Of Breath     Pt questions this allergy, pt has received LR multiple times without reaction   • Lavender Oil Anaphylaxis     Other reaction(s): anaphalxis   • Malto Dextrin [Dextrin] Anaphylaxis   • Other Anaphylaxis     Gel caps, maltodextrose, dextrose,grapes, lavender    • Penicillium Notatum Shortness Of Breath and Swelling   • Sumatriptan Anaphylaxis     Bradycardia, sob, back pressure, head pain,throat tightness  According to the patient she had anphylaxis   • Contrast [Iodinated Contrast Media] Other (See Comments)     Pt states needs to be premedicated prior to injection   • Corn Oil - Food Allergy - Food Allergy Hives   • Humira [Adalimumab] Other (See Comments)     "I develop drug induced lupus"   • Ibuprofen Hives and Throat Swelling   • Polyethylene Glycol Diarrhea and Vomiting   • Red Dye - Food Allergy Other (See Comments)     intolerance   • Remicade [Infliximab] Other (See Comments)     "I develop drug induced lupus"   • Sulfa Antibiotics Hives and Other (See Comments)   • Sulfate Hives   • Sulfites - Food Allergy Hives   • Chlorhexidine Itching   • Histamine Hives, Rash and Other (See Comments)     Intolerance         Labs:     Chemistry        Component Value Date/Time     01/16/2015 1213    K 3 6 01/30/2023 1904    K 4 2 01/16/2015 1213     01/30/2023 1904     01/16/2015 1213    CO2 23 01/30/2023 1904    CO2 29 1 01/16/2015 1213    BUN 10 01/30/2023 1904    BUN 10 01/16/2015 1213    CREATININE 0 64 01/30/2023 1904    CREATININE 0 67 01/16/2015 1213        Component Value Date/Time    CALCIUM 8 9 01/30/2023 1904    CALCIUM 9 6 01/16/2015 1213    ALKPHOS 57 01/30/2023 1904    ALKPHOS 97 01/16/2015 1213    AST 18 01/30/2023 1904    AST 11 01/16/2015 1213    ALT 18 01/30/2023 1904    ALT 51 01/16/2015 1213    BILITOT 0 7 01/16/2015 1213            No results found for: CHOL  Lab Results   Component Value Date    HDL 77 03/12/2022    HDL 75 10/25/2019    HDL 54 02/13/2019     Lab Results   Component Value Date    LDLCALC 105 (H) 03/12/2022    LDLCALC 115 (H) 10/25/2019    LDLCALC 62 02/13/2019     Lab Results   Component Value Date    TRIG 69 03/12/2022    TRIG 52 10/25/2019    TRIG 84 02/13/2019     No results found for: CHOLHDL    Imaging: No results found  ECG:  Normal sinus rhythm unremarkable tracing      ROS    Vitals:    02/20/23 1509   BP: 118/90   Pulse: 87     Vitals:    02/20/23 1509   Weight: 71 2 kg (157 lb)     Height: 5' 3" (160 cm)   Body mass index is 27 81 kg/m²      Physical Exam:  Vital signs reviewed  General:  Alert and cooperative, appears stated age, no acute distress  HEENT:  PERRLA, EOMI, no scleral icterus, no conjunctival pallor  Neck:  No lymphadenopathy, no thyromegaly, no carotid bruits, no elevated JVP  Heart:  Regular rate and rhythm, normal S1/S2, no S3/S4, no murmur, rubs or gallops  PMI nondisplaced  Lungs:  Clear to auscultation bilaterally, no wheezes rales or rhonchi  Abdomen:  Soft, non-tender, positive bowel sounds, no rebound or guarding,   no organomegaly   Extremities:  Normal range of motion    No clubbing, cyanosis or edema   Vascular:  2+ pedal pulses  Skin:  No rashes or lesions on exposed skin  Neurologic:  Cranial nerves II-XII grossly intact without focal deficits  Psych:  Normal mood and affect

## 2023-02-21 LAB
POTASSIUM SERPL-SCNC: 3.6 MMOL/L (ref 3.5–5.3)
T3FREE SERPL-MCNC: 2.6 PG/ML (ref 2.3–4.2)
T4 FREE SERPL-MCNC: 0.89 NG/DL (ref 0.76–1.46)
TSH SERPL DL<=0.05 MIU/L-ACNC: 0.94 UIU/ML (ref 0.45–4.5)

## 2023-02-22 ENCOUNTER — HOSPITAL ENCOUNTER (OUTPATIENT)
Dept: INFUSION CENTER | Facility: HOSPITAL | Age: 48
Discharge: HOME | End: 2023-02-22
Attending: INTERNAL MEDICINE

## 2023-02-22 VITALS
DIASTOLIC BLOOD PRESSURE: 86 MMHG | TEMPERATURE: 97.5 F | OXYGEN SATURATION: 100 % | RESPIRATION RATE: 17 BRPM | SYSTOLIC BLOOD PRESSURE: 128 MMHG | HEART RATE: 99 BPM

## 2023-02-22 DIAGNOSIS — L50.1 CHRONIC IDIOPATHIC URTICARIA: ICD-10-CM

## 2023-02-22 DIAGNOSIS — R63.0 ANOREXIA: Primary | ICD-10-CM

## 2023-02-22 DIAGNOSIS — R19.7 DIARRHEA, UNSPECIFIED TYPE: ICD-10-CM

## 2023-02-22 DIAGNOSIS — E44.1 MILD PROTEIN-CALORIE MALNUTRITION (HCC): ICD-10-CM

## 2023-02-22 RX ORDER — EPINEPHRINE 1 MG/ML
0.3 INJECTION, SOLUTION, CONCENTRATE INTRAVENOUS
Status: DISCONTINUED | OUTPATIENT
Start: 2023-02-22 | End: 2023-02-25 | Stop reason: HOSPADM

## 2023-02-22 RX ORDER — METHYLPREDNISOLONE SODIUM SUCCINATE 125 MG/2ML
60 INJECTION, POWDER, LYOPHILIZED, FOR SOLUTION INTRAMUSCULAR; INTRAVENOUS ONCE
Status: CANCELLED
Start: 2023-02-27 | End: 2023-02-23

## 2023-02-22 RX ORDER — EPINEPHRINE 1 MG/ML
0.3 INJECTION, SOLUTION, CONCENTRATE INTRAVENOUS
OUTPATIENT
Start: 2023-02-23

## 2023-02-22 RX ORDER — METHYLPREDNISOLONE SODIUM SUCCINATE 125 MG/2ML
60 INJECTION, POWDER, LYOPHILIZED, FOR SOLUTION INTRAMUSCULAR; INTRAVENOUS ONCE
Status: COMPLETED | OUTPATIENT
Start: 2023-02-22 | End: 2023-02-22

## 2023-02-22 RX ADMIN — DIPHENHYDRAMINE HYDROCHLORIDE 50 MG: 50 INJECTION, SOLUTION INTRAMUSCULAR; INTRAVENOUS at 12:27

## 2023-02-22 RX ADMIN — OMALIZUMAB 300 MG: 150 INJECTION, SOLUTION SUBCUTANEOUS at 13:53

## 2023-02-22 RX ADMIN — METHYLPREDNISOLONE SODIUM SUCCINATE 60 MG: 125 INJECTION, POWDER, FOR SOLUTION INTRAMUSCULAR; INTRAVENOUS at 12:10

## 2023-02-22 RX ADMIN — SODIUM CHLORIDE 1000 ML: 0.9 INJECTION, SOLUTION INTRAVENOUS at 11:10

## 2023-02-24 ENCOUNTER — TELEPHONE (OUTPATIENT)
Dept: UROLOGY | Facility: MEDICAL CENTER | Age: 48
End: 2023-02-24

## 2023-02-26 LAB — VIT C SERPL-MCNC: 1.9 MG/DL (ref 0.4–2)

## 2023-02-27 ENCOUNTER — HOSPITAL ENCOUNTER (OUTPATIENT)
Dept: INFUSION CENTER | Facility: HOSPITAL | Age: 48
Discharge: HOME | End: 2023-02-27
Attending: INTERNAL MEDICINE

## 2023-02-27 VITALS
DIASTOLIC BLOOD PRESSURE: 73 MMHG | OXYGEN SATURATION: 100 % | RESPIRATION RATE: 17 BRPM | TEMPERATURE: 98.1 F | SYSTOLIC BLOOD PRESSURE: 127 MMHG | HEART RATE: 96 BPM

## 2023-02-27 DIAGNOSIS — R19.7 DIARRHEA, UNSPECIFIED TYPE: ICD-10-CM

## 2023-02-27 DIAGNOSIS — K50.119 CROHN'S DISEASE OF COLON WITH COMPLICATION (HCC): ICD-10-CM

## 2023-02-27 DIAGNOSIS — R63.0 ANOREXIA: Primary | ICD-10-CM

## 2023-02-27 DIAGNOSIS — E44.1 MILD PROTEIN-CALORIE MALNUTRITION (HCC): ICD-10-CM

## 2023-02-27 RX ORDER — METHYLPREDNISOLONE SODIUM SUCCINATE 125 MG/2ML
60 INJECTION, POWDER, LYOPHILIZED, FOR SOLUTION INTRAMUSCULAR; INTRAVENOUS ONCE
Status: CANCELLED
Start: 2023-03-02 | End: 2023-02-28

## 2023-02-27 RX ORDER — METHYLPREDNISOLONE SODIUM SUCCINATE 125 MG/2ML
60 INJECTION, POWDER, LYOPHILIZED, FOR SOLUTION INTRAMUSCULAR; INTRAVENOUS ONCE
Status: COMPLETED | OUTPATIENT
Start: 2023-02-27 | End: 2023-02-27

## 2023-02-27 RX ADMIN — SODIUM CHLORIDE 1000 ML: 0.9 INJECTION, SOLUTION INTRAVENOUS at 11:53

## 2023-02-27 RX ADMIN — DIPHENHYDRAMINE HYDROCHLORIDE 50 MG: 50 INJECTION, SOLUTION INTRAMUSCULAR; INTRAVENOUS at 12:22

## 2023-02-27 RX ADMIN — METHYLPREDNISOLONE SODIUM SUCCINATE 60 MG: 125 INJECTION, POWDER, FOR SOLUTION INTRAMUSCULAR; INTRAVENOUS at 12:11

## 2023-02-27 RX ADMIN — USTEKINUMAB 90 MG: 90 INJECTION, SOLUTION SUBCUTANEOUS at 14:46

## 2023-03-01 LAB
THYROGLOB AB SERPL-ACNC: 1.8 IU/ML (ref 0–0.9)
THYROGLOB SERPL-MCNC: 7.4 NG/ML

## 2023-03-02 ENCOUNTER — DOCUMENTATION (OUTPATIENT)
Dept: FAMILY MEDICINE CLINIC | Facility: CLINIC | Age: 48
End: 2023-03-02

## 2023-03-02 ENCOUNTER — APPOINTMENT (OUTPATIENT)
Dept: LAB | Facility: HOSPITAL | Age: 48
End: 2023-03-02
Attending: INTERNAL MEDICINE

## 2023-03-02 ENCOUNTER — HOSPITAL ENCOUNTER (INPATIENT)
Facility: HOSPITAL | Age: 48
LOS: 7 days | Discharge: HOME | End: 2023-03-10
Attending: EMERGENCY MEDICINE

## 2023-03-02 ENCOUNTER — HOSPITAL ENCOUNTER (OUTPATIENT)
Dept: INFUSION CENTER | Facility: HOSPITAL | Age: 48
Discharge: HOME | End: 2023-03-02

## 2023-03-02 VITALS
DIASTOLIC BLOOD PRESSURE: 89 MMHG | RESPIRATION RATE: 16 BRPM | TEMPERATURE: 98.9 F | HEART RATE: 106 BPM | SYSTOLIC BLOOD PRESSURE: 120 MMHG | OXYGEN SATURATION: 99 %

## 2023-03-02 DIAGNOSIS — R63.0 ANOREXIA: Primary | ICD-10-CM

## 2023-03-02 DIAGNOSIS — K52.9 INFLAMMATORY BOWEL DISEASE: ICD-10-CM

## 2023-03-02 DIAGNOSIS — R19.7 DIARRHEA, UNSPECIFIED TYPE: ICD-10-CM

## 2023-03-02 DIAGNOSIS — G43.809 OTHER MIGRAINE WITHOUT STATUS MIGRAINOSUS, NOT INTRACTABLE: ICD-10-CM

## 2023-03-02 DIAGNOSIS — R19.7 DIARRHEA: ICD-10-CM

## 2023-03-02 DIAGNOSIS — K50.119 CROHN'S DISEASE OF LARGE INTESTINE WITH COMPLICATION (HCC): ICD-10-CM

## 2023-03-02 DIAGNOSIS — E44.1 MILD PROTEIN-CALORIE MALNUTRITION (HCC): ICD-10-CM

## 2023-03-02 DIAGNOSIS — T78.2XXD ANAPHYLAXIS, SUBSEQUENT ENCOUNTER: ICD-10-CM

## 2023-03-02 DIAGNOSIS — D89.40 MAST CELL ACTIVATION SYNDROME (HCC): Primary | ICD-10-CM

## 2023-03-02 DIAGNOSIS — B37.0 THRUSH: ICD-10-CM

## 2023-03-02 DIAGNOSIS — B99.8 IMMUNOSUPPRESSION-RELATED INFECTIOUS DISEASE (HCC): ICD-10-CM

## 2023-03-02 DIAGNOSIS — R11.2 NAUSEA & VOMITING: ICD-10-CM

## 2023-03-02 DIAGNOSIS — D84.9 IMMUNOSUPPRESSION-RELATED INFECTIOUS DISEASE (HCC): ICD-10-CM

## 2023-03-02 LAB
ALBUMIN SERPL BCP-MCNC: 4.1 G/DL (ref 3.5–5)
ALP SERPL-CCNC: 51 U/L (ref 34–104)
ALT SERPL W P-5'-P-CCNC: 16 U/L (ref 7–52)
ANION GAP SERPL CALCULATED.3IONS-SCNC: 10 MMOL/L (ref 4–13)
AST SERPL W P-5'-P-CCNC: 19 U/L (ref 13–39)
BASOPHILS # BLD AUTO: 0.03 THOUSANDS/ÂΜL (ref 0–0.1)
BASOPHILS # BLD AUTO: 0.08 THOUSANDS/ÂΜL (ref 0–0.1)
BASOPHILS NFR BLD AUTO: 0 % (ref 0–1)
BASOPHILS NFR BLD AUTO: 1 % (ref 0–1)
BILIRUB SERPL-MCNC: 0.29 MG/DL (ref 0.2–1)
BUN SERPL-MCNC: 12 MG/DL (ref 5–25)
CALCIUM SERPL-MCNC: 8.6 MG/DL (ref 8.4–10.2)
CHLORIDE SERPL-SCNC: 108 MMOL/L (ref 96–108)
CO2 SERPL-SCNC: 20 MMOL/L (ref 21–32)
CREAT SERPL-MCNC: 0.63 MG/DL (ref 0.6–1.3)
CRP SERPL QL: 1.2 MG/L
EOSINOPHIL # BLD AUTO: 0 THOUSAND/ÂΜL (ref 0–0.61)
EOSINOPHIL # BLD AUTO: 0 THOUSAND/ÂΜL (ref 0–0.61)
EOSINOPHIL NFR BLD AUTO: 0 % (ref 0–6)
EOSINOPHIL NFR BLD AUTO: 0 % (ref 0–6)
ERYTHROCYTE [DISTWIDTH] IN BLOOD BY AUTOMATED COUNT: 14.6 % (ref 11.6–15.1)
ERYTHROCYTE [DISTWIDTH] IN BLOOD BY AUTOMATED COUNT: 14.6 % (ref 11.6–15.1)
GFR SERPL CREATININE-BSD FRML MDRD: 104 ML/MIN/1.73SQ M
GLUCOSE SERPL-MCNC: 116 MG/DL (ref 65–140)
HCT VFR BLD AUTO: 41.4 % (ref 34.8–46.1)
HCT VFR BLD AUTO: 42.9 % (ref 34.8–46.1)
HGB BLD-MCNC: 13.8 G/DL (ref 11.5–15.4)
HGB BLD-MCNC: 14.6 G/DL (ref 11.5–15.4)
IMM GRANULOCYTES # BLD AUTO: 0.02 THOUSAND/UL (ref 0–0.2)
IMM GRANULOCYTES # BLD AUTO: 0.02 THOUSAND/UL (ref 0–0.2)
IMM GRANULOCYTES NFR BLD AUTO: 0 % (ref 0–2)
IMM GRANULOCYTES NFR BLD AUTO: 0 % (ref 0–2)
LYMPHOCYTES # BLD AUTO: 0.61 THOUSANDS/ÂΜL (ref 0.6–4.47)
LYMPHOCYTES # BLD AUTO: 0.62 THOUSANDS/ÂΜL (ref 0.6–4.47)
LYMPHOCYTES NFR BLD AUTO: 8 % (ref 14–44)
LYMPHOCYTES NFR BLD AUTO: 8 % (ref 14–44)
MCH RBC QN AUTO: 30.6 PG (ref 26.8–34.3)
MCH RBC QN AUTO: 31 PG (ref 26.8–34.3)
MCHC RBC AUTO-ENTMCNC: 33.3 G/DL (ref 31.4–37.4)
MCHC RBC AUTO-ENTMCNC: 34 G/DL (ref 31.4–37.4)
MCV RBC AUTO: 91 FL (ref 82–98)
MCV RBC AUTO: 92 FL (ref 82–98)
MONOCYTES # BLD AUTO: 0.07 THOUSAND/ÂΜL (ref 0.17–1.22)
MONOCYTES # BLD AUTO: 0.16 THOUSAND/ÂΜL (ref 0.17–1.22)
MONOCYTES NFR BLD AUTO: 1 % (ref 4–12)
MONOCYTES NFR BLD AUTO: 2 % (ref 4–12)
NEUTROPHILS # BLD AUTO: 6.85 THOUSANDS/ÂΜL (ref 1.85–7.62)
NEUTROPHILS # BLD AUTO: 7.22 THOUSANDS/ÂΜL (ref 1.85–7.62)
NEUTS SEG NFR BLD AUTO: 90 % (ref 43–75)
NEUTS SEG NFR BLD AUTO: 90 % (ref 43–75)
NRBC BLD AUTO-RTO: 0 /100 WBCS
NRBC BLD AUTO-RTO: 0 /100 WBCS
PLATELET # BLD AUTO: 508 THOUSANDS/UL (ref 149–390)
PLATELET # BLD AUTO: 518 THOUSANDS/UL (ref 149–390)
PMV BLD AUTO: 8.9 FL (ref 8.9–12.7)
PMV BLD AUTO: 9 FL (ref 8.9–12.7)
POTASSIUM SERPL-SCNC: 3.8 MMOL/L (ref 3.5–5.3)
PROT SERPL-MCNC: 7.3 G/DL (ref 6.4–8.4)
RBC # BLD AUTO: 4.51 MILLION/UL (ref 3.81–5.12)
RBC # BLD AUTO: 4.71 MILLION/UL (ref 3.81–5.12)
SODIUM SERPL-SCNC: 138 MMOL/L (ref 135–147)
WBC # BLD AUTO: 7.67 THOUSAND/UL (ref 4.31–10.16)
WBC # BLD AUTO: 8.01 THOUSAND/UL (ref 4.31–10.16)

## 2023-03-02 RX ORDER — BUTALBITAL, ACETAMINOPHEN AND CAFFEINE 50; 325; 40 MG/1; MG/1; MG/1
1 TABLET ORAL EVERY 8 HOURS PRN
Qty: 20 TABLET | Refills: 0 | Status: SHIPPED | OUTPATIENT
Start: 2023-03-02

## 2023-03-02 RX ORDER — LORAZEPAM 2 MG/ML
0.5 INJECTION INTRAMUSCULAR ONCE
Status: COMPLETED | OUTPATIENT
Start: 2023-03-02 | End: 2023-03-02

## 2023-03-02 RX ORDER — METHYLPREDNISOLONE SODIUM SUCCINATE 125 MG/2ML
60 INJECTION, POWDER, LYOPHILIZED, FOR SOLUTION INTRAMUSCULAR; INTRAVENOUS ONCE
Status: CANCELLED
Start: 2023-03-03 | End: 2023-03-03

## 2023-03-02 RX ORDER — EPINEPHRINE 1 MG/ML
0.3 INJECTION, SOLUTION, CONCENTRATE INTRAVENOUS ONCE
Status: COMPLETED | OUTPATIENT
Start: 2023-03-02 | End: 2023-03-02

## 2023-03-02 RX ORDER — DIPHENHYDRAMINE HYDROCHLORIDE 50 MG/ML
50 INJECTION INTRAMUSCULAR; INTRAVENOUS ONCE
Status: COMPLETED | OUTPATIENT
Start: 2023-03-02 | End: 2023-03-02

## 2023-03-02 RX ORDER — EPINEPHRINE 0.3 MG/.3ML
0.3 INJECTION SUBCUTANEOUS ONCE
Qty: 0.12 ML | Refills: 2 | Status: ON HOLD | OUTPATIENT
Start: 2023-03-02 | End: 2023-03-06 | Stop reason: SDUPTHER

## 2023-03-02 RX ORDER — LORAZEPAM 1 MG/1
1 TABLET ORAL ONCE
Status: DISCONTINUED | OUTPATIENT
Start: 2023-03-02 | End: 2023-03-02

## 2023-03-02 RX ORDER — METHYLPREDNISOLONE SODIUM SUCCINATE 125 MG/2ML
125 INJECTION, POWDER, LYOPHILIZED, FOR SOLUTION INTRAMUSCULAR; INTRAVENOUS ONCE
Status: DISCONTINUED | OUTPATIENT
Start: 2023-03-02 | End: 2023-03-02

## 2023-03-02 RX ORDER — METHYLPREDNISOLONE SODIUM SUCCINATE 125 MG/2ML
60 INJECTION, POWDER, LYOPHILIZED, FOR SOLUTION INTRAMUSCULAR; INTRAVENOUS ONCE
Status: COMPLETED | OUTPATIENT
Start: 2023-03-02 | End: 2023-03-02

## 2023-03-02 RX ORDER — METHYLPREDNISOLONE SODIUM SUCCINATE 125 MG/2ML
80 INJECTION, POWDER, LYOPHILIZED, FOR SOLUTION INTRAMUSCULAR; INTRAVENOUS ONCE
Status: COMPLETED | OUTPATIENT
Start: 2023-03-02 | End: 2023-03-02

## 2023-03-02 RX ORDER — ONDANSETRON 2 MG/ML
4 INJECTION INTRAMUSCULAR; INTRAVENOUS ONCE
Status: COMPLETED | OUTPATIENT
Start: 2023-03-02 | End: 2023-03-02

## 2023-03-02 RX ADMIN — DIPHENHYDRAMINE HYDROCHLORIDE 50 MG: 50 INJECTION, SOLUTION INTRAMUSCULAR; INTRAVENOUS at 13:54

## 2023-03-02 RX ADMIN — METHYLPREDNISOLONE SODIUM SUCCINATE 60 MG: 125 INJECTION, POWDER, FOR SOLUTION INTRAMUSCULAR; INTRAVENOUS at 13:50

## 2023-03-02 RX ADMIN — LORAZEPAM 0.5 MG: 2 INJECTION INTRAMUSCULAR; INTRAVENOUS at 23:47

## 2023-03-02 RX ADMIN — ONDANSETRON 4 MG: 2 INJECTION INTRAMUSCULAR; INTRAVENOUS at 23:48

## 2023-03-02 RX ADMIN — SODIUM CHLORIDE 1000 ML: 0.9 INJECTION, SOLUTION INTRAVENOUS at 13:54

## 2023-03-02 RX ADMIN — EPINEPHRINE 0.3 MG: 1 INJECTION, SOLUTION, CONCENTRATE INTRAVENOUS at 23:53

## 2023-03-02 RX ADMIN — DIPHENHYDRAMINE HYDROCHLORIDE 50 MG: 50 INJECTION, SOLUTION INTRAMUSCULAR; INTRAVENOUS at 23:47

## 2023-03-02 RX ADMIN — SODIUM CHLORIDE 1000 ML: 0.9 INJECTION, SOLUTION INTRAVENOUS at 23:45

## 2023-03-02 RX ADMIN — METHYLPREDNISOLONE SODIUM SUCCINATE 80 MG: 125 INJECTION, POWDER, FOR SOLUTION INTRAMUSCULAR; INTRAVENOUS at 23:48

## 2023-03-03 LAB
ALBUMIN SERPL BCP-MCNC: 4 G/DL (ref 3.5–5)
ALBUMIN SERPL BCP-MCNC: 4.1 G/DL (ref 3.5–5)
ALP SERPL-CCNC: 43 U/L (ref 34–104)
ALP SERPL-CCNC: 51 U/L (ref 34–104)
ALT SERPL W P-5'-P-CCNC: 15 U/L (ref 7–52)
ALT SERPL W P-5'-P-CCNC: 17 U/L (ref 7–52)
ANION GAP SERPL CALCULATED.3IONS-SCNC: 12 MMOL/L (ref 4–13)
ANION GAP SERPL CALCULATED.3IONS-SCNC: 9 MMOL/L (ref 4–13)
APTT PPP: 23 SECONDS (ref 23–37)
AST SERPL W P-5'-P-CCNC: 13 U/L (ref 13–39)
AST SERPL W P-5'-P-CCNC: 17 U/L (ref 13–39)
BASOPHILS # BLD AUTO: 0.01 THOUSANDS/ÂΜL (ref 0–0.1)
BASOPHILS NFR BLD AUTO: 0 % (ref 0–1)
BILIRUB SERPL-MCNC: 0.26 MG/DL (ref 0.2–1)
BILIRUB SERPL-MCNC: 0.42 MG/DL (ref 0.2–1)
BUN SERPL-MCNC: 18 MG/DL (ref 5–25)
BUN SERPL-MCNC: 7 MG/DL (ref 5–25)
CALCIUM SERPL-MCNC: 8.8 MG/DL (ref 8.4–10.2)
CALCIUM SERPL-MCNC: 9.1 MG/DL (ref 8.4–10.2)
CHLORIDE SERPL-SCNC: 106 MMOL/L (ref 96–108)
CHLORIDE SERPL-SCNC: 108 MMOL/L (ref 96–108)
CO2 SERPL-SCNC: 20 MMOL/L (ref 21–32)
CO2 SERPL-SCNC: 21 MMOL/L (ref 21–32)
CREAT SERPL-MCNC: 0.61 MG/DL (ref 0.6–1.3)
CREAT SERPL-MCNC: 0.77 MG/DL (ref 0.6–1.3)
EOSINOPHIL # BLD AUTO: 0 THOUSAND/ÂΜL (ref 0–0.61)
EOSINOPHIL NFR BLD AUTO: 0 % (ref 0–6)
ERYTHROCYTE [DISTWIDTH] IN BLOOD BY AUTOMATED COUNT: 14.8 % (ref 11.6–15.1)
FLUAV RNA RESP QL NAA+PROBE: NEGATIVE
FLUBV RNA RESP QL NAA+PROBE: NEGATIVE
GFR SERPL CREATININE-BSD FRML MDRD: 106 ML/MIN/1.73SQ M
GFR SERPL CREATININE-BSD FRML MDRD: 90 ML/MIN/1.73SQ M
GLUCOSE SERPL-MCNC: 127 MG/DL (ref 65–140)
GLUCOSE SERPL-MCNC: 155 MG/DL (ref 65–140)
GLUCOSE SERPL-MCNC: 185 MG/DL (ref 65–140)
GLUCOSE SERPL-MCNC: 190 MG/DL (ref 65–140)
GLUCOSE SERPL-MCNC: 207 MG/DL (ref 65–140)
GLUCOSE SERPL-MCNC: 211 MG/DL (ref 65–140)
HCT VFR BLD AUTO: 40.6 % (ref 34.8–46.1)
HGB BLD-MCNC: 13.5 G/DL (ref 11.5–15.4)
IMM GRANULOCYTES # BLD AUTO: 0.06 THOUSAND/UL (ref 0–0.2)
IMM GRANULOCYTES NFR BLD AUTO: 1 % (ref 0–2)
INR PPP: 0.94 (ref 0.84–1.19)
LYMPHOCYTES # BLD AUTO: 1.23 THOUSANDS/ÂΜL (ref 0.6–4.47)
LYMPHOCYTES NFR BLD AUTO: 10 % (ref 14–44)
MAGNESIUM SERPL-MCNC: 1.9 MG/DL (ref 1.9–2.7)
MCH RBC QN AUTO: 30.5 PG (ref 26.8–34.3)
MCHC RBC AUTO-ENTMCNC: 33.3 G/DL (ref 31.4–37.4)
MCV RBC AUTO: 92 FL (ref 82–98)
MONOCYTES # BLD AUTO: 0.7 THOUSAND/ÂΜL (ref 0.17–1.22)
MONOCYTES NFR BLD AUTO: 6 % (ref 4–12)
NEUTROPHILS # BLD AUTO: 10.1 THOUSANDS/ÂΜL (ref 1.85–7.62)
NEUTS SEG NFR BLD AUTO: 83 % (ref 43–75)
NRBC BLD AUTO-RTO: 0 /100 WBCS
PLATELET # BLD AUTO: 498 THOUSANDS/UL (ref 149–390)
PMV BLD AUTO: 9 FL (ref 8.9–12.7)
POTASSIUM SERPL-SCNC: 3.5 MMOL/L (ref 3.5–5.3)
POTASSIUM SERPL-SCNC: 4.1 MMOL/L (ref 3.5–5.3)
PROT SERPL-MCNC: 6.9 G/DL (ref 6.4–8.4)
PROT SERPL-MCNC: 7.2 G/DL (ref 6.4–8.4)
PROTHROMBIN TIME: 12.8 SECONDS (ref 11.6–14.5)
RBC # BLD AUTO: 4.42 MILLION/UL (ref 3.81–5.12)
RSV RNA RESP QL NAA+PROBE: NEGATIVE
SARS-COV-2 RNA RESP QL NAA+PROBE: NEGATIVE
SODIUM SERPL-SCNC: 136 MMOL/L (ref 135–147)
SODIUM SERPL-SCNC: 140 MMOL/L (ref 135–147)
WBC # BLD AUTO: 12.1 THOUSAND/UL (ref 4.31–10.16)

## 2023-03-03 RX ORDER — METHYLPREDNISOLONE SODIUM SUCCINATE 40 MG/ML
40 INJECTION, POWDER, LYOPHILIZED, FOR SOLUTION INTRAMUSCULAR; INTRAVENOUS DAILY
Status: DISCONTINUED | OUTPATIENT
Start: 2023-03-04 | End: 2023-03-03

## 2023-03-03 RX ORDER — DIPHENHYDRAMINE HYDROCHLORIDE 50 MG/ML
50 INJECTION INTRAMUSCULAR; INTRAVENOUS EVERY 4 HOURS PRN
Status: DISCONTINUED | OUTPATIENT
Start: 2023-03-03 | End: 2023-03-04

## 2023-03-03 RX ORDER — LORAZEPAM 2 MG/ML
1 INJECTION INTRAMUSCULAR EVERY 6 HOURS PRN
Status: DISCONTINUED | OUTPATIENT
Start: 2023-03-03 | End: 2023-03-10 | Stop reason: HOSPADM

## 2023-03-03 RX ORDER — DIPHENHYDRAMINE HYDROCHLORIDE 50 MG/ML
INJECTION INTRAMUSCULAR; INTRAVENOUS
Status: COMPLETED
Start: 2023-03-03 | End: 2023-03-03

## 2023-03-03 RX ORDER — SODIUM CHLORIDE 9 MG/ML
125 INJECTION, SOLUTION INTRAVENOUS CONTINUOUS
Status: DISCONTINUED | OUTPATIENT
Start: 2023-03-03 | End: 2023-03-10 | Stop reason: HOSPADM

## 2023-03-03 RX ORDER — METHYLPREDNISOLONE SODIUM SUCCINATE 125 MG/2ML
80 INJECTION, POWDER, LYOPHILIZED, FOR SOLUTION INTRAMUSCULAR; INTRAVENOUS DAILY
Status: DISCONTINUED | OUTPATIENT
Start: 2023-03-03 | End: 2023-03-04

## 2023-03-03 RX ORDER — HYDROXYZINE HYDROCHLORIDE 25 MG/1
25 TABLET, FILM COATED ORAL 4 TIMES DAILY
Status: DISCONTINUED | OUTPATIENT
Start: 2023-03-03 | End: 2023-03-04

## 2023-03-03 RX ORDER — LACTOBACILLUS RHAMNOSUS GG 10B CELL
CAPSULE ORAL DAILY
Status: DISCONTINUED | OUTPATIENT
Start: 2023-03-03 | End: 2023-03-03

## 2023-03-03 RX ORDER — HYDROMORPHONE HCL/PF 1 MG/ML
0.5 SYRINGE (ML) INJECTION EVERY 6 HOURS PRN
Status: DISCONTINUED | OUTPATIENT
Start: 2023-03-03 | End: 2023-03-10 | Stop reason: HOSPADM

## 2023-03-03 RX ORDER — FAMOTIDINE 10 MG/ML
20 INJECTION, SOLUTION INTRAVENOUS DAILY
Status: DISCONTINUED | OUTPATIENT
Start: 2023-03-03 | End: 2023-03-03

## 2023-03-03 RX ORDER — FAMOTIDINE 10 MG/ML
20 INJECTION, SOLUTION INTRAVENOUS EVERY 12 HOURS SCHEDULED
Status: DISCONTINUED | OUTPATIENT
Start: 2023-03-03 | End: 2023-03-04

## 2023-03-03 RX ORDER — LEVALBUTEROL INHALATION SOLUTION 1.25 MG/3ML
1.25 SOLUTION RESPIRATORY (INHALATION) EVERY 4 HOURS PRN
Status: DISCONTINUED | OUTPATIENT
Start: 2023-03-03 | End: 2023-03-10 | Stop reason: HOSPADM

## 2023-03-03 RX ORDER — EPINEPHRINE 1 MG/ML
0.3 INJECTION, SOLUTION, CONCENTRATE INTRAVENOUS ONCE
Status: DISCONTINUED | OUTPATIENT
Start: 2023-03-03 | End: 2023-03-04

## 2023-03-03 RX ORDER — LORAZEPAM 2 MG/ML
1 INJECTION INTRAMUSCULAR EVERY 8 HOURS PRN
Status: DISCONTINUED | OUTPATIENT
Start: 2023-03-03 | End: 2023-03-03

## 2023-03-03 RX ORDER — LEVOTHYROXINE SODIUM 0.1 MG/1
100 TABLET ORAL EVERY OTHER DAY
Status: DISCONTINUED | OUTPATIENT
Start: 2023-03-03 | End: 2023-03-04

## 2023-03-03 RX ORDER — HYDROMORPHONE HCL/PF 1 MG/ML
1 SYRINGE (ML) INJECTION EVERY 6 HOURS PRN
Status: DISCONTINUED | OUTPATIENT
Start: 2023-03-03 | End: 2023-03-10 | Stop reason: HOSPADM

## 2023-03-03 RX ADMIN — DIPHENHYDRAMINE HYDROCHLORIDE 50 MG: 50 INJECTION, SOLUTION INTRAMUSCULAR; INTRAVENOUS at 10:58

## 2023-03-03 RX ADMIN — DIPHENHYDRAMINE HYDROCHLORIDE 25 MG: 50 INJECTION, SOLUTION INTRAMUSCULAR; INTRAVENOUS at 05:22

## 2023-03-03 RX ADMIN — METHYLPREDNISOLONE SODIUM SUCCINATE 80 MG: 125 INJECTION, POWDER, FOR SOLUTION INTRAMUSCULAR; INTRAVENOUS at 10:58

## 2023-03-03 RX ADMIN — HYDROMORPHONE HYDROCHLORIDE 1 MG: 1 INJECTION, SOLUTION INTRAMUSCULAR; INTRAVENOUS; SUBCUTANEOUS at 16:57

## 2023-03-03 RX ADMIN — HYDROMORPHONE HYDROCHLORIDE 1 MG: 1 INJECTION, SOLUTION INTRAMUSCULAR; INTRAVENOUS; SUBCUTANEOUS at 23:22

## 2023-03-03 RX ADMIN — HYDROMORPHONE HYDROCHLORIDE 1 MG: 1 INJECTION, SOLUTION INTRAMUSCULAR; INTRAVENOUS; SUBCUTANEOUS at 10:58

## 2023-03-03 RX ADMIN — FAMOTIDINE 20 MG: 10 INJECTION, SOLUTION INTRAVENOUS at 04:33

## 2023-03-03 RX ADMIN — DIPHENHYDRAMINE HYDROCHLORIDE 50 MG: 50 INJECTION, SOLUTION INTRAMUSCULAR; INTRAVENOUS at 23:20

## 2023-03-03 RX ADMIN — LORAZEPAM 1 MG: 2 INJECTION INTRAMUSCULAR; INTRAVENOUS at 23:32

## 2023-03-03 RX ADMIN — LORAZEPAM 1 MG: 2 INJECTION, SOLUTION INTRAMUSCULAR; INTRAVENOUS at 09:15

## 2023-03-03 RX ADMIN — SODIUM CHLORIDE 125 ML/HR: 0.9 INJECTION, SOLUTION INTRAVENOUS at 04:03

## 2023-03-03 RX ADMIN — FAMOTIDINE 20 MG: 10 INJECTION, SOLUTION INTRAVENOUS at 21:28

## 2023-03-03 RX ADMIN — DIPHENHYDRAMINE HYDROCHLORIDE 50 MG: 50 INJECTION, SOLUTION INTRAMUSCULAR; INTRAVENOUS at 16:56

## 2023-03-03 RX ADMIN — SODIUM CHLORIDE 125 ML/HR: 0.9 INJECTION, SOLUTION INTRAVENOUS at 18:33

## 2023-03-03 NOTE — PROGRESS NOTES
[address] [facility]  Progress Note - Rory Goodpasture 1975, 46 y o  female MRN: [id number]  Unit/Bed#: 408-01 Encounter: [id number]  Primary Care Provider: Jessica Bazzi DO   Date and time admitted to hospital: 3/2/2023 10:00 PM    * 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HCC)  Assessment & Plan  · Reports erythema, itching on her chest as well as burning sensation in her throat; reports these are the symptoms she gets when her mast cell activation syndrome acts up  · Is on Stelara injections every 3 weeks  · Patient reports hives worsening this morning- 3/3  · Will start patient on the regimen list she has from her Outpatient allergist   · Will give 1 time dose of epi 0 3 mg IM now   · IV solu medrol 80 mg daily  · IM benadryl 50 mg every 4-6 hours prn  · IV Pepcid 20 mg q 12 hours  · PRN ativan 1 mg IV every 6 hours prn  · Xopenex neb every 4 hours prn  · NS IV fluids 125 mL/hr  · Continue to monitor    Crohn's disease of colon with complication ([facility] )  Assessment & Plan  · On Stelara injections every 3 weeks  · GI consulted per patient request    Hypothyroidism (acquired)  Assessment & Plan  · Continue home regimen of levothyroxine        VTE Pharmacologic Prophylaxis: VTE Score: 2 Low Risk (Score 0-2) - Encourage Ambulation  Patient Centered Rounds: I performed bedside rounds with nursing staff today  Discussions with Specialists or Other Care Team Provider: nursing, CM    Education and Discussions with Family / Patient: patient updated at bedside  Total Time Spent on Date of Encounter in care of patient: 35 minutes This time was spent on one or more of the following: performing physical exam; counseling and coordination of care; obtaining or reviewing history; documenting in the medical record; reviewing/ordering tests, medications or procedures; communicating with other healthcare professionals and discussing with patient's family/caregivers      Current Length of Stay: 0 day(s)  Current Patient Status: Observation   Certification Statement: The patient will continue to require additional inpatient hospital stay due to continued treatment with IV steroids, IM benadryl, IV pepcid, IV fluids  Discharge Plan: Anticipate discharge in 24-48 hrs to home  Code Status: Level 1 - Full Code    Subjective: The patient was seen and examined  The patient states she feels her hives are worsening and she's more itchy this morning  She states she hasn't received her medications as per her list from her allergies for when she's admitted for an acute flare  Objective:     Vitals:   Temp (24hrs), Av 3 °F (36 8 °C), Min:97 6 °F (36 4 °C), Max:98 9 °F (37 2 °C)    Temp:  [97 6 °F (36 4 °C)-98 9 °F (37 2 °C)] 98 3 °F (36 8 °C)  HR:  [] 91  Resp:  [16-19] 19  BP: (120-133)/(77-89) 125/81  SpO2:  [96 %-99 %] 97 %  Body mass index is 25 64 kg/m²  Input and Output Summary (last 24 hours): Intake/Output Summary (Last 24 hours) at 3/3/2023 1143  Last data filed at 3/3/2023 4223  Gross per 24 hour   Intake 0 ml   Output --   Net 0 ml       Physical Exam:   Physical Exam  Vitals and nursing note reviewed  Constitutional:       General: She is awake  Appearance: Normal appearance  Cardiovascular:      Rate and Rhythm: Normal rate and regular rhythm  Pulmonary:      Effort: Pulmonary effort is normal       Breath sounds: Normal breath sounds  No wheezing, rhonchi or rales  Abdominal:      Palpations: Abdomen is soft  Tenderness: There is no abdominal tenderness  Skin:     General: Skin is warm and dry  Comments: Hives noted on upper chest   Neurological:      General: No focal deficit present  Mental Status: She is alert and oriented to person, place, and time  Psychiatric:         Attention and Perception: Attention normal          Mood and Affect: Mood is anxious  Speech: Speech normal          Behavior: Behavior is cooperative           Additional Data: Labs:  Results from last 7 days   Lab Units 03/03/23  1054   WBC Thousand/uL 12 10*   HEMOGLOBIN g/dL 13 5   HEMATOCRIT % 40 6   PLATELETS Thousands/uL 498*   NEUTROS PCT % 83*   LYMPHS PCT % 10*   MONOS PCT % 6   EOS PCT % 0     Results from last 7 days   Lab Units 03/03/23  1054   SODIUM mmol/L 140   POTASSIUM mmol/L 3 5   CHLORIDE mmol/L 108   CO2 mmol/L 20*   BUN mg/dL 7   CREATININE mg/dL 0 61   ANION GAP mmol/L 12   CALCIUM mg/dL 8 8   ALBUMIN g/dL 4 0   TOTAL BILIRUBIN mg/dL 0 42   ALK PHOS U/L 43   ALT U/L 15   AST U/L 13   GLUCOSE RANDOM mg/dL 190*     Results from last 7 days   Lab Units 03/03/23  1054   INR  0 94     Results from last 7 days   Lab Units 03/03/23  1121 03/03/23  0724   POC GLUCOSE mg/dl 185* 155*               Lines/Drains:  Invasive Devices     Peripheral Intravenous Line  Duration           Peripheral IV 03/02/23 Dorsal (posterior); Right Forearm <1 day                      Imaging: No pertinent imaging reviewed      Recent Cultures (last 7 days):         Last 24 Hours Medication List:   Current Facility-Administered Medications   Medication Dose Route Frequency Provider Last Rate   • diphenhydrAMINE  50 mg Intramuscular Q4H PRN Solange Oates PA-C     • EPINEPHrine PF  0 3 mg Intramuscular Once Solange Oates PA-C     • famotidine  20 mg Intravenous Q12H [address] Solange Oates PA-C     • HYDROmorphone  0 5 mg Intravenous Q6H PRN Solange Oates PA-C      Or   • HYDROmorphone  1 mg Intravenous Q6H PRN Solange Oates PA-C     • hydrOXYzine HCL  25 mg Oral 4x Daily Alisha Hipps, DO     • levalbuterol  1 25 mg Nebulization Q4H PRN Solange Oates PA-C     • levothyroxine  100 mcg Oral Every Other Day Alisha Hipps, DO     • LORazepam  1 mg Intravenous Q6H PRN Solange Oates PA-C     • methylPREDNISolone sodium succinate  80 mg Intravenous Daily Solange Oates PA-C     • sodium chloride  125 mL/hr Intravenous Continuous Alisha Hipps,  mL/hr (03/03/23 0403)        Today, Patient Was Seen By: Jason Montiel PA-C    **Please Note: This note may have been constructed using a voice recognition system  **

## 2023-03-03 NOTE — ED PROVIDER NOTES
History  Chief Complaint   Patient presents with   • Diarrhea     Pt states inhaled birdseed and since then has had diarrhea no saliva and having a allergic reaction and has no epi pen at home     49-year-old female with a past medical history of asthma, Crohn's disease, mast cell activation syndrome requiring Stelara injections every 3 weeks, last injection 1 day, rheumatoid arthritis, seizures, atrial tachycardia, who presents for complaints  Patient states that she has had flareups of her mast cell activation syndrome that have presented similarly  She states that there was some change to the formulary for her Stelara injection, and since then she has had decreased saliva, she has had some vomiting and diarrhea as well as decreased appetite, and a burning sensation in her throat  She states that her symptoms often start like this, she tried to manage this at home with her home medications, as well as her regular infusions, however this did not improve her symptoms  She was recommended at the infusion center to come in for further care since she reportedly did not feel well despite her infusions  Patient states that she also inhaled birdseed recently that had cornseed and it and she believes that this is worsening her symptoms as well  States that in the past, if she does not get this treated, typically over 24 to 48 hours with IV Benadryl, Solu-Medrol, fluids, she will worsen and develop seizures, worsening symptoms  Review of systems otherwise negative  Prior to Admission Medications   Prescriptions Last Dose Informant Patient Reported? Taking?    Alfalfa 650 MG TABS   Yes No   Sig: Take by mouth   Patient not taking: Reported on 2/6/2023   B-D 3CC LUER-SHAMEKA SYR 25GX1" 25G X 1" 3 ML MISC   Yes No   Patient not taking: Reported on 2/6/2023   BUDESONIDE PO   Yes No   Sig: Take 3 mg by mouth daily 3 tabs   Cholecalciferol 10 MCG /0 025ML LIQD   No No   Sig: Take 1,000 Units by mouth 2 (two) times a day   EPINEPHrine (EpiPen 2-Malachi) 0 3 mg/0 3 mL SOAJ   No No   Sig: Inject 0 3 mL (0 3 mg total) into a muscle once for 1 dose   LORazepam (ATIVAN) 1 mg tablet   No No   Sig: Take 1 tablet (1 mg total) by mouth every 8 (eight) hours as needed for anxiety   Lactobacillus (PROBIOTIC ACIDOPHILUS PO)   Yes No   Sig: Take by mouth   Lactobacillus-Inulin (Culturelle [employer]) CAPS   Yes No   Sig: Take 200 mg by mouth daily   NON FORMULARY   Yes No   Sig: immunoglobin sq 6g 30 ml once a week   Riboflavin (Vitamin B-2) 100 MG TABS   No No   Sig: Take 4 tablets (400 mg total) by mouth daily   Stelara 90 MG/ML subcutaneous injection   No No   Simg inject subcutaneously every 21 days   Synthroid 50 MCG tablet   Yes No   Sig: Taking 1 5 mcg daily   Patient not taking: Reported on 2023   Synthroid 75 MCG tablet   Yes No   Patient not taking: Reported on 2023   TechLite Lancets MISC   Yes No   budesonide (ENTOCORT EC) 3 MG capsule   Yes No   butalbital-acetaminophen-caffeine (FIORICET,ESGIC) -40 mg per tablet   No No   Sig: Take 1 tablet by mouth every 8 (eight) hours as needed for migraine   cetirizine (ZyrTEC) 10 mg tablet   Yes No   Sig: Take 20 mg by mouth daily   cyanocobalamin 1,000 mcg/mL   Yes No   diclofenac (VOLTAREN) 75 mg EC tablet   No No   Sig: Take 1 tablet (75 mg total) by mouth 2 (two) times a day for 21 days   Patient taking differently: Take 75 mg by mouth as needed   diphenhydrAMINE (BENADRYL) 25 mg tablet   Yes No   Sig: Take 50 mg by mouth 2 (two) times a day   ergocalciferol (VITAMIN D2) 50,000 units   Yes No   Patient not taking: Reported on 2022   famotidine (PEPCID) 40 MG tablet   No No   Sig: Take 1 tablet (40 mg total) by mouth daily   Patient not taking: Reported on 2022   furosemide (LASIX) 20 mg tablet   Yes No   Sig: as needed   hydrOXYzine HCL (ATARAX) 25 mg tablet   Yes No   Sig: Take 25 mg by mouth 4 (four) times a day    levalbuterol (XOPENEX HFA) 45 mcg/act inhaler   Yes No   Sig: Inhale 1-2 puffs every 4 (four) hours as needed for shortness of breath   Patient not taking: Reported on 12/27/2021   levothyroxine 100 mcg tablet   Yes No   Sig: Take 100 mcg by mouth every other day   liothyronine (CYTOMEL) 5 mcg tablet   Yes No   Sig: Take 5 mcg by mouth daily   Patient not taking: Reported on 11/23/2022   montelukast (SINGULAIR) 10 mg tablet   No No   Sig: Take 1 tablet (10 mg total) by mouth daily at bedtime   Patient not taking: Reported on 2/6/2023   nystatin (MYCOSTATIN) 500,000 units/5 mL suspension   Yes No   omalizumab (XOLAIR) 150 mg   Yes No   Sig: Inject 300 mg under the skin every 21 days   pantoprazole (PROTONIX) 40 mg tablet   No No   Sig: TAKE ONE TABLET BY MOUTH TWICE A DAY   predniSONE 20 mg tablet   No No   Sig: Take as instructed by physician   20mg as needed on days with symptoms   simethicone (MYLICON) 302 MG chewable tablet   Yes No   Sig: Chew 125 mg every 6 (six) hours as needed for flatulence   Patient not taking: Reported on 2/6/2023      Facility-Administered Medications: None       Past Medical History:   Diagnosis Date   • Anemia 2007   • Anxiety    • Asthma    • Bile duct stricture 2014    Sphincter of oddi dysfunction   • Chronic kidney disease    • Colon polyp 1998   • Crohn's colitis ([facility] [address] )    • Deep [facility] )    • Disease of thyroid gland    • 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   • Generalized anxiety disorder 08/26/2017   • GERD (gastroesophageal reflux disease) 1995   • GI (gastrointestinal bleed) 1994   • Heart murmur    • Inflammatory bowel disease    • Irritable bowel syndrome 2016   • Lactose intolerance 1982   • Mast cell disease    • Migraine    • Myocardial infarction ([facility] )     NSTEMI  pt denies, documented in cardio note   • Pancreatitis     sphinger of    • Psychiatric disorder    • RA (rheumatoid arthritis) ([facility] [address] )    • Seizures ([facility] [address] )    • Ulcerative colitis ([facility] [address] )    • Visual impairment Past Surgical History:   Procedure Laterality Date   • ABDOMINAL SURGERY  2017   • APPENDECTOMY     • CHOLECYSTECTOMY     • COLONOSCOPY  2019   • EGD AND COLONOSCOPY N/A 09/12/2017    Procedure: EGD AND COLONOSCOPY;  Surgeon: Carloz Lowery MD;  Location: [facility]; Service: Gastroenterology   • ERCP N/A 09/15/2017    Procedure: ENDOSCOPIC RETROGRADE CHOLANGIOPANCREATOGRAPHY (ERCP); Surgeon: Logan Benton MD;  Location: [facility];   Service: Gastroenterology   • HYSTERECTOMY     • KNEE SURGERY Right    • LAPAROSCOPIC ENDOMETRIOSIS FULGURATION     • ORIF ANKLE FRACTURE Left    • LA ARTHRS KNE SURG W/MENISCECTOMY MED/LAT W/SHVG Left 05/12/2017    Procedure: POSSIBLE MEDIAL MENISECTOMY;  Surgeon: Linda Zuluaga MD;  Location: [facility];  Service: Orthopedics   • LA ARTHRS KNEE DEBRIDEMENT/SHAVING JOYA Liz Left 05/12/2017    Procedure: KNEE ARTHROSCOPY EVALUATION, CHONDROPLASTY;  Surgeon: Linda Zuluaga MD;  Location: [facility];  Service: Orthopedics   • LA LAPS ABD PRTM&OMENTUM DX W/WO [address] [facility] [address]/ [facility] N/A 12/12/2017    Procedure: LAPAROSCOPY DIAGNOSTIC  WITH LYSIS OF ADHESIONS;  Surgeon: Boris Spears DO;  [facility]: [facility];  Service: General   • UPPER GASTROINTESTINAL ENDOSCOPY  2019       Family History   Problem Relation Age of Onset   • Ulcerative colitis Mother    • Arthritis Mother    • Colon polyps Mother    • Heart failure Father    • Neuropathy Father    • Macular degeneration Father    • Diabetes Father    • Early death Father    • Vision loss Father    • Asthma Daughter    • Hypothyroidism Daughter    • Heart disease Maternal Grandmother    • Arthritis Maternal Grandmother    • No Known Problems Maternal Grandfather    • Arthritis Paternal Grandmother    • Macular degeneration Paternal Grandmother    • Vision loss Paternal Grandmother    • Lymphoma Paternal Grandfather    • Cancer Paternal Grandfather    • Early death Paternal Grandfather    • Breast cancer Maternal Aunt    • Cancer Maternal Aunt • Miscarriages / Stillbirths Maternal Aunt    • Heart failure Paternal Aunt    • Heart failure Paternal Aunt    • Irritable bowel syndrome Paternal Aunt    • Breast cancer Paternal Aunt    • Breast cancer additional onset Paternal [de-identified]    • Hypothyroidism Paternal Aunt    • Cancer Paternal Aunt    • No Known Problems Son    • No Known Problems Son    • Kidney disease Brother    • Depression Paternal Uncle    • Early death Paternal Uncle      I have reviewed and agree with the history as documented  E-Cigarette/Vaping   • E-Cigarette Use Never User      E-Cigarette/Vaping Substances   • Nicotine No    • THC No    • CBD No    • Flavoring No    • Other No    • Unknown No      Social History     Tobacco Use   • Smoking status: Never   • Smokeless tobacco: Never   Vaping Use   • Vaping Use: Never used   Substance Use Topics   • Alcohol use: Never   • Drug use: Never       Review of Systems   Constitutional: Negative for chills and fever  HENT: Positive for sore throat  Negative for congestion and rhinorrhea  Respiratory: Negative for cough and shortness of breath  Cardiovascular: Negative for chest pain and palpitations  Gastrointestinal: Positive for diarrhea, nausea and vomiting  Negative for abdominal pain and constipation  Genitourinary: Negative for difficulty urinating and flank pain  Musculoskeletal: Negative for arthralgias  Neurological: Negative for dizziness, weakness, light-headedness and headaches  Psychiatric/Behavioral: Negative for agitation, behavioral problems and confusion  All other systems reviewed and are negative  Physical Exam  Physical Exam  Constitutional:       Appearance: She is well-developed  Comments: Well-appearing   HENT:      Head: Normocephalic and atraumatic        Right Ear: Tympanic membrane normal       Left Ear: Tympanic membrane normal       Mouth/Throat:      Comments: Voice mildly hoarse  Cardiovascular:      Rate and Rhythm: Normal rate and regular rhythm  Heart sounds: Normal heart sounds  No murmur heard  No friction rub  Pulmonary:      Effort: Pulmonary effort is normal  No respiratory distress  Breath sounds: Normal breath sounds  No wheezing or rales  Comments: No wheezing  Abdominal:      General: Bowel sounds are normal  There is no distension  Palpations: Abdomen is soft  Tenderness: There is no abdominal tenderness  Musculoskeletal:         General: Normal range of motion  Cervical back: Normal range of motion and neck supple  Skin:     General: Skin is warm  Neurological:      Mental Status: She is alert and oriented to person, place, and time  Coordination: Coordination normal    Psychiatric:         Behavior: Behavior normal          Thought Content:  Thought content normal          Judgment: Judgment normal          Vital Signs  ED Triage Vitals [03/02/23 2204]   Temperature Pulse Respirations Blood Pressure SpO2   97 6 °F (36 4 °C) (!) 106 18 133/85 99 %      Temp Source Heart Rate Source Patient Position - Orthostatic VS BP Location FiO2 (%)   Temporal Monitor Sitting Right arm --      Pain Score       --           Vitals:    03/02/23 2204   BP: 133/85   Pulse: (!) 106   Patient Position - Orthostatic VS: Sitting         Visual Acuity      ED Medications  Medications   sodium chloride 0 9 % bolus 1,000 mL (1,000 mL Intravenous New Bag 3/2/23 2345)   diphenhydrAMINE (BENADRYL) injection 50 mg (50 mg Intravenous Given 3/2/23 2347)   EPINEPHrine PF (ADRENALIN) 1 mg/mL injection 0 3 mg (0 3 mg Intramuscular Given 3/2/23 2353)   ondansetron (ZOFRAN) injection 4 mg (4 mg Intravenous Given 3/2/23 2348)   methylPREDNISolone sodium succinate (Solu-MEDROL) injection 80 mg (80 mg Intravenous Given 3/2/23 2348)   LORazepam (ATIVAN) injection 0 5 mg (0 5 mg Intravenous Given 3/2/23 2347)       Diagnostic Studies  Results Reviewed     Procedure Component Value Units Date/Time    Comprehensive metabolic panel [[id number]]  (Abnormal) Collected: 03/02/23 2328    Lab Status: Final result Specimen: Blood from Arm, Right Updated: 03/03/23 0028     Sodium 136 mmol/L      Potassium 4 1 mmol/L      Chloride 106 mmol/L      CO2 21 mmol/L      ANION GAP 9 mmol/L      BUN 18 mg/dL      Creatinine 0 77 mg/dL      Glucose 211 mg/dL      Calcium 9 1 mg/dL      AST 17 U/L      ALT 17 U/L      Alkaline Phosphatase 51 U/L      Total Protein 7 2 g/dL      Albumin 4 1 g/dL      Total Bilirubin 0 26 mg/dL      eGFR 90 ml/min/1 73sq m     Narrative:      [facility] guidelines for Chronic Kidney Disease (CKD):   •  Stage 1 with normal or high GFR (GFR > 90 mL/min/1 73 square meters)  •  Stage 2 Mild CKD (GFR = 60-89 mL/min/1 73 square meters)  •  Stage 3A Moderate CKD (GFR = 45-59 mL/min/1 73 square meters)  •  Stage 3B Moderate CKD (GFR = 30-44 mL/min/1 73 square meters)  •  Stage 4 Severe CKD (GFR = 15-29 mL/min/1 73 square meters)  •  Stage 5 End Stage CKD (GFR <15 mL/min/1 73 square meters)  Note: GFR calculation is accurate only with a steady state creatinine    COVID/FLU/RSV [[id number]]  (Normal) Collected: 03/02/23 2328    Lab Status: Final result Specimen: Nares from Nose Updated: 03/03/23 0024     SARS-CoV-2 Negative     INFLUENZA A PCR Negative     INFLUENZA B PCR Negative     RSV PCR Negative    Narrative:      FOR PEDIATRIC PATIENTS - copy/paste COVID Guidelines URL to browser: [email or web address]    SARS-CoV-2 assay is a Nucleic Acid Amplification assay intended for the  qualitative detection of nucleic acid from SARS-CoV-2 in nasopharyngeal  swabs  Results are for the presumptive identification of SARS-CoV-2 RNA  Positive results are indicative of infection with SARS-CoV-2, the virus  causing COVID-19, but do not rule out bacterial infection or co-infection  with other viruses   Laboratories within the [place] and its  territories are required to report all positive results to the appropriate  public health authorities  Negative results do not preclude SARS-CoV-2  infection and should not be used as the sole basis for treatment or other  patient management decisions  Negative results must be combined with  clinical observations, patient history, and epidemiological information  This test has not been FDA cleared or approved  This test has been authorized by FDA under an Emergency Use Authorization  (EUA)  This test is only authorized for the duration of time the  declaration that circumstances exist justifying the authorization of the  emergency use of an in vitro diagnostic tests for detection of SARS-CoV-2  virus and/or diagnosis of COVID-19 infection under section 564(b)(1) of  the Act, 21 U  S C  275RGP-5(X)(5), unless the authorization is terminated  or revoked sooner  The test has been validated but independent review by FDA  and CLIA is pending  Test performed using [employer] GeneXpert: This RT-PCR assay targets N2,  a region unique to SARS-CoV-2  A conserved region in the E-gene was chosen  for pan-Sarbecovirus detection which includes SARS-CoV-2  According to CMS-2020-01-R, this platform meets the definition of high-throughput technology      CBC and differential [[id number]]  (Abnormal) Collected: 03/02/23 2328    Lab Status: Final result Specimen: Blood from Arm, Right Updated: 03/02/23 2353     WBC 7 67 Thousand/uL      RBC 4 51 Million/uL      Hemoglobin 13 8 g/dL      Hematocrit 41 4 %      MCV 92 fL      MCH 30 6 pg      MCHC 33 3 g/dL      RDW 14 6 %      MPV 9 0 fL      Platelets 426 Thousands/uL      nRBC 0 /100 WBCs      Neutrophils Relative 90 %      Immat GRANS % 0 %      Lymphocytes Relative 8 %      Monocytes Relative 2 %      Eosinophils Relative 0 %      Basophils Relative 0 %      Neutrophils Absolute 6 85 Thousands/µL      Immature Grans Absolute 0 02 Thousand/uL      Lymphocytes Absolute 0 61 Thousands/µL Monocytes Absolute 0 16 Thousand/µL      Eosinophils Absolute 0 00 Thousand/µL      Basophils Absolute 0 03 Thousands/µL     Narrative: This is an appended report  These results have been appended to a previously verified report  Tryptase [[id number]] Collected: 03/02/23 2328    Lab Status: In process Specimen: Blood from Arm, Right Updated: 03/02/23 2334                 No orders to display              Procedures  Procedures         ED Course                                             Medical Decision Making  I reviewed the patient's medical chart, PMHx, prior encounters, medications  DDx includes: Mast-cell activation syndrome, viral syndrome, gastroenteritis, allergy, anaphylaxis    Patient has long history of mast cell activation syndrome, she undergoes regular infusions  At this time, she states that this is when her symptoms can worsen, and that she can acutely become ill if she is not treated  At this time, we will treat her in accordance to how she has been treated in the past with success, Solu-Medrol, Benadryl  We will give 1 dose of epinephrine in the emergency department as she states that this is usually helpful for her  We will also give Ativan and Zofran  Will admit to [facility] for further care  Diarrhea: self-limited or minor problem  Mast cell activation syndrome [facility]): chronic illness or injury with exacerbation, progression, or side effects of treatment  Nausea & vomiting: acute illness or injury  Amount and/or Complexity of Data Reviewed  Labs: ordered  Risk  Prescription drug management  Decision regarding hospitalization            Disposition  Final diagnoses:   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[facility] [address] )   Nausea & vomiting   Diarrhea     Time reflects when diagnosis was documented in both MDM as applicable and the Disposition within this note     Time User Action Codes Description Comment    3/3/2023  1:01 AM Kenia Garcia Add [D89 40] 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[facility] [address] ) 3/3/2023  1:01 AM Que Garcia Add [R11 2] Nausea & vomiting     3/3/2023  1:01 AM Boubacar Goodwin Add [R19 7] Diarrhea       ED Disposition     ED Disposition   Admit    Condition   Stable    Date/Time   Fri Mar 3, 2023  1:00 AM    Comment   Case was discussed with JAY and the patient's admission status was agreed to be Admission Status: observation status to the service of Dr Tate Leija   Follow-up Information    None         Patient's Medications   Discharge Prescriptions    No medications on file       No discharge procedures on file      PDMP Review       Value Time User    PDMP Reviewed  Yes 3/2/2023  3:16 PM Meg Corado DO          ED Provider  Electronically Signed by           Hugo Brooks MD  03/03/23 0142

## 2023-03-03 NOTE — H&P
[address]  H&P- Rory Goodpasture 1975, 46 y o  female MRN: [id number]  Unit/Bed#: RM03 Encounter: [id number]  Primary Care Provider: Jessica Bazzi DO   Date and time admitted to hospital: 3/2/2023 10:00 PM    * 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HCC)  Assessment & Plan  · Reports erythema, itching on her chest as well as burning sensation in her throat; reports these are the symptoms she gets when her mast cell activation syndrome acts up  · Additionally reports that she recently inhaled birdseed and that this as well as corn seed make her symptoms worse  · Reports that she has needed previous hospitalization for epinephrine, steroids, Benadryl and IV fluids; reports that if she does not get these she can develop seizures and worsening of symptoms  · Is on Stelara injections every 3 weeks  · Given epinephrine, steroid, Benadryl, IV fluids in the ER  · On exam erythema noted on the anterior chest wall, no significant wheezing, difficult to appreciate any swelling of the tongue or lips  · We will admit to medicine observation status  Continue daily steroid, IV Benadryl as well as IV fluids  Crohn's disease of colon with complication ([facility] [place] [address] )  Assessment & Plan  · On Stelara injections every 3 weeks    Hypothyroidism (acquired)  Assessment & Plan  · Continue PTA levothyroxine          VTE Prophylaxis:sequential compression device   Code Status: Level 1 - Full Code       Anticipated Length of Stay:  Patient will be admitted on an Observation basis with an anticipated length of stay of  < 2 midnights  Justification for Hospital Stay: Please see detailed plans noted above      Chief Complaint:     Mast cell activation syndrome  History of Present Illness:  Rory Goodpasture is a 46 y o  female who has past medical history significant for mast cell activation syndrome, Crohn's who presented to [address] [place] [address] ER on the evening of 3/2 with symptoms of erythema and itching of her anterior chest wall, burning sensation in her throat as well as occasional vomiting and diarrhea  She reports that these are the symptoms she gets when her mast cell activation syndrome flares up  She further reports that she recently had a change to her Stelara injection and since then her symptoms have been worsening  She further additionally reports that she thinks she inhaled birdseed recently and that the bird seed typically makes this worse  She reports that she requires hospitalization for this with epinephrine, IV Benadryl, Solu-Medrol, Pepcid and fluids  She reports that without treatment for this her symptoms worsen and she can develop seizures  Patient denies any fevers        Review of Systems:    Constitutional:  Denies fever or chills   Eyes:  Denies change in visual acuity   HENT:  Denies nasal congestion or sore throat   Respiratory:  Denies cough   Cardiovascular:  Denies chest pain or edema   GI: Positive for diarrhea, positive for tightness in throat  :  Denies dysuria   Musculoskeletal:  Denies back pain or joint pain   Integument: Positive for rash on chest  Neurologic:  Denies headache or sensory changes   Endocrine:  Denies polyuria or polydipsia   Lymphatic:  Denies swollen glands   Psychiatric:  Denies depression or anxiety     Past Medical and Surgical History:   Past Medical History:   Diagnosis Date   • Anemia 2007   • Anxiety    • Asthma    • Bile duct stricture 2014    Sphincter of oddi dysfunction   • Chronic kidney disease    • Colon polyp 1998   • Crohn's colitis ([facility] [address] )    • Deep vein thrombosis (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 [address] ) NSTEMI  pt denies, documented in cardio note   • Pancreatitis     sphinger of    • Psychiatric disorder    • RA (rheumatoid arthritis) ([facility] [place] [address] )    • Seizures ([facility] [place] [address] )    • Ulcerative colitis ([facility] [place] [address] )    • Visual impairment      Past Surgical History:   Procedure Laterality Date   • ABDOMINAL SURGERY  2017   • APPENDECTOMY     • CHOLECYSTECTOMY     • COLONOSCOPY  2019   • EGD AND COLONOSCOPY N/A 09/12/2017    Procedure: EGD AND COLONOSCOPY;  Surgeon: Ira Lancaster MD;  [facility]: [facility]; Service: Gastroenterology   • ERCP N/A 09/15/2017    Procedure: ENDOSCOPIC RETROGRADE CHOLANGIOPANCREATOGRAPHY (ERCP); Surgeon: Nora Caraballo MD;  [facility];   Service: Gastroenterology   • HYSTERECTOMY     • KNEE SURGERY Right    • LAPAROSCOPIC ENDOMETRIOSIS FULGURATION     • ORIF ANKLE FRACTURE Left    • CO ARTHRS KNE SURG W/MENISCECTOMY MED/LAT W/SHVG Left 05/12/2017    Procedure: POSSIBLE MEDIAL MENISECTOMY;  Surgeon: Aylin Holder MD;  Location: [facility];  Service: Orthopedics   • CO ARTHRS KNEE DEBRIDEMENT/SHAVING ARTCLR CRTLG Left 05/12/2017    Procedure: KNEE ARTHROSCOPY EVALUATION, CHONDROPLASTY;  Surgeon: Aylin Holder MD;  Location: [facility];  Service: Orthopedics   • CO LAPS ABD PRTM&OMENTUM DX W/WO SPEC BR/WA SPX N/A 12/12/2017    Procedure: LAPAROSCOPY DIAGNOSTIC  WITH LYSIS OF ADHESIONS;  Surgeon: Tracey Hearn DO;  [facility]: [facility];  Service: General   • UPPER GASTROINTESTINAL ENDOSCOPY  2019       Meds/Allergies:  Current Facility-Administered Medications on File Prior to Encounter   Medication Dose Route Frequency Provider Last Rate Last Admin   • [COMPLETED] diphenhydrAMINE (BENADRYL) 50 mg in sodium chloride 0 9 % 50 mL IVPB  50 mg Intravenous Once Colin Ortiz MD   Stopped at 03/02/23 1415   • [COMPLETED] methylPREDNISolone sodium succinate (Solu-MEDROL) injection 60 mg  60 mg Intravenous Once Colin Ortiz MD   60 mg at 03/02/23 1350   • [COMPLETED] sodium chloride 0 9 % bolus 1,000 mL  1,000 mL Intravenous Once Maryjane De La Paz MD   Stopped at 03/02/23 1615     Current Outpatient Medications on File Prior to Encounter   Medication Sig Dispense Refill   • Alfalfa 650 MG TABS Take by mouth (Patient not taking: Reported on 2/6/2023)     • B-D 3CC LUER-SHAMEKA SYR 25GX1" 25G X 1" 3 ML MISC  (Patient not taking: Reported on 2/6/2023)     • budesonide (ENTOCORT EC) 3 MG capsule      • BUDESONIDE PO Take 3 mg by mouth daily 3 tabs     • butalbital-acetaminophen-caffeine (FIORICET,ESGIC) -40 mg per tablet Take 1 tablet by mouth every 8 (eight) hours as needed for migraine 20 tablet 0   • cetirizine (ZyrTEC) 10 mg tablet Take 20 mg by mouth daily     • Cholecalciferol 10 MCG /0 025ML LIQD Take 1,000 Units by mouth 2 (two) times a day 750 mL 1   • cyanocobalamin 1,000 mcg/mL      • diclofenac (VOLTAREN) 75 mg EC tablet Take 1 tablet (75 mg total) by mouth 2 (two) times a day for 21 days (Patient taking differently: Take 75 mg by mouth as needed) 42 tablet 2   • diphenhydrAMINE (BENADRYL) 25 mg tablet Take 50 mg by mouth 2 (two) times a day     • EPINEPHrine (EpiPen 2-Malachi) 0 3 mg/0 3 mL SOAJ Inject 0 3 mL (0 3 mg total) into a muscle once for 1 dose 0 12 mL 2   • ergocalciferol (VITAMIN D2) 50,000 units  (Patient not taking: Reported on 11/23/2022)     • famotidine (PEPCID) 40 MG tablet Take 1 tablet (40 mg total) by mouth daily (Patient not taking: Reported on 11/23/2022) 90 tablet 0   • furosemide (LASIX) 20 mg tablet as needed     • hydrOXYzine HCL (ATARAX) 25 mg tablet Take 25 mg by mouth 4 (four) times a day      • Lactobacillus (PROBIOTIC ACIDOPHILUS PO) Take by mouth     • Lactobacillus-Inulin ([facility]) CAPS Take 200 mg by mouth daily     • levalbuterol (XOPENEX HFA) 45 mcg/act inhaler Inhale 1-2 puffs every 4 (four) hours as needed for shortness of breath (Patient not taking: Reported on 12/27/2021)     • levothyroxine 100 mcg tablet Take 100 mcg by mouth every other day     • liothyronine (CYTOMEL) 5 mcg tablet Take 5 mcg by mouth daily (Patient not taking: Reported on 11/23/2022)     • LORazepam (ATIVAN) 1 mg tablet Take 1 tablet (1 mg total) by mouth every 8 (eight) hours as needed for anxiety 21 tablet 0   • montelukast (SINGULAIR) 10 mg tablet Take 1 tablet (10 mg total) by mouth daily at bedtime (Patient not taking: Reported on 2/6/2023) 30 tablet 0   • NON FORMULARY immunoglobin sq 6g 30 ml once a week     • nystatin (MYCOSTATIN) 500,000 units/5 mL suspension      • omalizumab (XOLAIR) 150 mg Inject 300 mg under the skin every 21 days     • pantoprazole (PROTONIX) 40 mg tablet TAKE ONE TABLET BY MOUTH TWICE A DAY 60 tablet 5   • predniSONE 20 mg tablet Take as instructed by physician  20mg as needed on days with symptoms 100 tablet 0   • Riboflavin (Vitamin B-2) 100 MG TABS Take 4 tablets (400 mg total) by mouth daily 360 tablet 3   • simethicone (MYLICON) 535 MG chewable tablet Chew 125 mg every 6 (six) hours as needed for flatulence (Patient not taking: Reported on 2/6/2023)     • Stelara 90 MG/ML subcutaneous injection 90mg inject subcutaneously every 21 days 1 mL 6   • Synthroid 50 MCG tablet Taking 1 5 mcg daily (Patient not taking: Reported on 2/6/2023)     • Synthroid 75 MCG tablet  (Patient not taking: Reported on 2/6/2023)     • TechLite Lancets MISC        Allergies:    Allergies   Allergen Reactions   • Aimovig [Erenumab-Aooe] Anaphylaxis   • Amitriptyline Anaphylaxis     According to the patient she had nphylaxis   • Aspergillus Allergy Skin Test Other (See Comments), Hives and Swelling   • Azathioprine Other (See Comments) and Anaphylaxis     pancreatitis  According to patient she needed Epipen   • Buspar [Buspirone] Hives and Shortness Of Breath   • Corn Dextrin Anaphylaxis     Any corn based products   • Eggs Or Egg-Derived Products - Food Allergy Anaphylaxis   • Gelatin - Food Allergy Anaphylaxis   • Heparin Hives     Painful welts    • Lactated Ringers Shortness Of Breath     Pt questions this allergy, pt has received LR multiple times without reaction   • Lavender Oil Anaphylaxis     Other reaction(s): anaphalxis   • Malto Dextrin [Dextrin] Anaphylaxis   • Other Anaphylaxis     Gel caps, maltodextrose, dextrose,grapes, lavender    • Penicillium Notatum Shortness Of Breath and Swelling   • Sumatriptan Anaphylaxis     Bradycardia, sob, back pressure, head pain,throat tightness  According to the patient she had anphylaxis   • Contrast [Iodinated Contrast Media] Other (See Comments)     Pt states needs to be premedicated prior to injection   • Corn Oil - Food Allergy - Food Allergy Hives   • Humira [Adalimumab] Other (See Comments)     "I develop drug induced lupus"   • Ibuprofen Hives and Throat Swelling   • Polyethylene Glycol Diarrhea and Vomiting   • Red Dye - Food Allergy Other (See Comments)     intolerance   • Remicade [Infliximab] Other (See Comments)     "I develop drug induced lupus"   • Sulfa Antibiotics Hives and Other (See Comments)   • Sulfate Hives   • Sulfites - Food Allergy Hives   • Chlorhexidine Itching   • Histamine Hives, Rash and Other (See Comments)     Intolerance         History:  Marital Status: /Civil Union     Substance Use History:   Social History     Substance and Sexual Activity   Alcohol Use Never     Social History     Tobacco Use   Smoking Status Never   Smokeless Tobacco Never     Social History     Substance and Sexual Activity   Drug Use Never       Family History:  Family History   Problem Relation Age of Onset   • Ulcerative colitis Mother    • Arthritis Mother    • Colon polyps Mother    • Heart failure Father    • Neuropathy Father    • Macular degeneration Father    • Diabetes Father    • Early death Father    • Vision loss Father    • Asthma Daughter    • Hypothyroidism Daughter    • Heart disease Maternal Grandmother    • Arthritis Maternal Grandmother    • No Known Problems Maternal Grandfather    • Arthritis Paternal Grandmother    • Macular degeneration Paternal Grandmother    • Vision loss Paternal Grandmother    • Lymphoma Paternal Grandfather    • Cancer Paternal Grandfather    • Early death Paternal Grandfather    • Breast cancer Maternal Aunt    • Cancer Maternal Aunt    • Miscarriages / Stillbirths Maternal Aunt    • Heart failure Paternal Aunt    • Heart failure Paternal Aunt    • Irritable bowel syndrome Paternal Aunt    • Breast cancer Paternal Aunt    • Breast cancer additional onset Paternal [de-identified]    • Hypothyroidism Paternal Aunt    • Cancer Paternal Aunt    • No Known Problems Son    • No Known Problems Son    • Kidney disease Brother    • Depression Paternal Uncle    • Early death Paternal Uncle        Physical Exam:     Vitals:   Blood Pressure: 133/85 (03/02/23 2204)  Pulse: (!) 106 (03/02/23 2204)  Temperature: 97 6 °F (36 4 °C) (03/02/23 2204)  Temp Source: Temporal (03/02/23 2204)  Respirations: 18 (03/02/23 2204)  SpO2: 99 % (03/02/23 2204)    Constitutional: A&Ox4,  Eyes:  EOMI, No scleral icterus   HENT:   oropharynx moist, external ears normal, external nose normal   Respiratory:   no wheezing, no increased work of breathing   Cardiovascular:  Mild tachycardia, no murmurs   GI:  Soft, nondistended, no guarding   :  No costovertebral angle tenderness   Musculoskeletal:  no tenderness, no deformities  Back- no tenderness  Integument:  Erythema on anterior chest wall  Neurologic:  Alert &awake, communicative, CN 2-12 normal,  no focal deficits noted   Psychiatric:  Speech and behavior appropriate       Lab Results: I have personally reviewed pertinent reports        Results from last 7 days   Lab Units 03/02/23  2328   WBC Thousand/uL 7 67   HEMOGLOBIN g/dL 13 8   HEMATOCRIT % 41 4   PLATELETS Thousands/uL 518*   NEUTROS PCT % 90*   LYMPHS PCT % 8*   MONOS PCT % 2*   EOS PCT % 0     Results from last 7 days   Lab Units 03/02/23  2328   POTASSIUM mmol/L 4 1   CHLORIDE mmol/L 106   CO2 mmol/L 21   BUN mg/dL 18 CREATININE mg/dL 0 77   CALCIUM mg/dL 9 1   ALK PHOS U/L 51   ALT U/L 17   AST U/L 17             Imaging: I have personally reviewed pertinent reports  No results found  Total time for visit, including counseling/coordination of care: 45 minutes  Greater than 50% of this total time spent on direct patient counseling and coorination of care  Epic Records Reviewed as well as Records in Care Everywhere    ** Please Note: Dragon 360 Dictation voice to text software was used in the creation of this document   **

## 2023-03-03 NOTE — ASSESSMENT & PLAN NOTE
· Reports erythema, itching on her chest as well as burning sensation in her throat; reports these are the symptoms she gets when her mast cell activation syndrome acts up  · Is on Stelara injections every 3 weeks  · Patient reports hives worsening this morning- 3/3  · Will start patient on the regimen list she has from her Outpatient allergist   · Will give 1 time dose of epi 0 3 mg IM now   · IV solu medrol 80 mg daily  · IM benadryl 50 mg every 4-6 hours prn  · IV Pepcid 20 mg q 12 hours  · PRN ativan 1 mg IV every 6 hours prn  · Xopenex neb every 4 hours prn  · NS IV fluids 125 mL/hr  · Continue to monitor

## 2023-03-03 NOTE — CASE MANAGEMENT
Case Management Assessment & Discharge Planning Note    Patient name Wadley [facility]  Location [facility] [id number] MRN [id number]  : 1975 Date 3/3/2023       Current Admission Date: 3/2/2023  Current Admission Diagnosis:Mast cell activation syndrome [facility])   Patient Active Problem List    Diagnosis Date Noted   • Immunosuppression due to chronic steroid use ([facility] [address] ) 2022   • Atrial tachycardia ([facility] [address] ) 2022   • Nephrolithiasis 2021   • Anaphylactic reaction 2021   • Intractable nausea and vomiting 2021   • Heart palpitations 2021   • Hyponatremia 2021   • Inflammatory bowel disease 2021   • Current chronic use of systemic steroids 2021   • MS (multiple sclerosis) ([facility] [address] ) 2021   • Migraine    • Overweight (BMI 25 0-29 9) 2021   • Anorexia 10/17/2020   • [facility]'s disease of colon with complication ([facility] )    • Mild protein-calorie malnutrition ([facility] ) 2019   • Constipation 09/10/2019   • Mass of left axilla 2019   • Immunosuppressed status ([facility] ) 2019   • Thrombocytosis 2019   • Proctitis 2019   • Thrush 2019   • Chronic back pain 2019   • Primary localized osteoarthrosis of ankle and foot 2018   • Fibromyalgia 2017   • CHF (congestive heart failure) ([facility] ) 2017   • Meniere's disease 2017   • Leukocytosis 09/10/2017   • Hypomagnesemia 2017   • Generalized anxiety disorder 2017   • Diarrhea 2017   • Vitamin D deficiency 2017   • Hypokalemia 2017   • Throat tightness 2017   • Abdominal pain 2017   • Headache 2017   • Disorder of synovium 2017   • Chondromalacia 2017   • Chronic idiopathic urticaria 2017   • 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[facility] [address] ) 2016   • Hypothyroidism (acquired) 2014      LOS (days): 0  Geometric Mean LOS (GMLOS) (days):   Days to GMLOS:     OBJECTIVE:              Current admission status: Observation       Preferred Pharmacy:   [address], [address] [facility]  [address]  [facility]  [address]  [facility] [address]  Phone: [phone] Fax: [phone]    [address], [address] [facility] [address]  Phone: [phone] Fax: [phone]    [facility] (Specialty) [address] [facility], [address]  [address] [place] [address]  Phone: [phone] Fax: [phone]    Primary Care Provider: Lourdes Alexis DO    Primary Insurance: [employer] [place]  Secondary Insurance: MEDICARE    ASSESSMENT:  [address] Representative - Spouse   Primary Phone: [phone] (Mobile)  Home Phone: [phone]               Advance Directives  Does patient have a [address] [place] [address] Avenue?: No  Was patient offered paperwork?: Yes (declines)  Does patient currently have a Health Care decision maker?: Yes, please see Health Care Proxy section  Does patient have Advance Directives?: No  Was patient offered paperwork?: Yes (declines)  Primary Contact: Vladimir Joseph (Spouse)   [phone]         Readmission Root Cause  30 Day Readmission: No    Patient Information  Admitted from[de-identified] Home  Mental Status: Alert  During Assessment patient was accompanied by: Not accompanied during assessment  Assessment information provided by[de-identified] Patient  Primary Caregiver: Self  Support Systems: Spouse/significant other  [place] of Residence: [facility] do you live in?: [address] entry access options   Select all that apply : Stairs  Number of steps to enter home :  (12-front; 3 -back; 4 -side)  Type of Current Residence: [place]  In the last 12 months, was there a time when you were not able to pay the mortgage or rent on time?: No  In the last 12 months, how many places have you lived?: 1  In the last 12 months, was there a time when you did not have a steady place to sleep or slept in a shelter (including now)?: No  Homeless/housing insecurity resource given?: N/A  Living Arrangements: Lives w/ Spouse/significant other  Is patient a ?: No    Activities of Daily Living Prior to Admission  Functional Status: Independent  Completes ADLs independently?: Yes  Ambulates independently?: Yes  Does patient use assisted devices?: No  Does patient currently own DME?: No  Does patient have a history of Outpatient Therapy (PT/OT)?: No  Does the patient have a history of Short-Term Rehab?: No  Does patient have a history of HHC?: No  Does patient currently have [facility]?: No         Patient Information Continued  Does patient have prescription coverage?: Yes  Within the past 12 months, you worried that your food would run out before you got the money to buy more : Never true  Within the past 12 months, the food you bought just didn't last and you didn't have money to get more : Never true  Food insecurity resource given?: N/A  Does patient receive dialysis treatments?: No  Does patient have a history of substance abuse?: No  Does patient have a history of Mental Health Diagnosis?: Yes (anxiety)  Is patient receiving treatment for mental health?: No  Patient declined treatment information  Has patient received inpatient treatment related to mental health in the last 2 years?: No         Means of Transportation  Means of Transport to Appts[de-identified] Drives Self  In the past 12 months, has lack of transportation kept you from medical appointments or from getting medications?: No  In the past 12 months, has lack of transportation kept you from meetings, work, or from getting things needed for daily living?: No  Was application for public transport provided?: N/A        DISCHARGE DETAILS:    Discharge planning discussed with[de-identified] patient        CM contacted family/caregiver?: No- see comments (pt is alert/oriented, independent   No family contacted)  Were Treatment Team discharge recommendations reviewed with patient/caregiver?: Yes  Did patient/caregiver verbalize understanding of patient care needs?: Yes  Were patient/caregiver advised of the risks associated with not following Treatment Team discharge recommendations?: Yes         [facility]         Is the patient interested in [facility] at discharge?: No    DME Referral Provided  Referral made for DME?: No         Would you like to participate in our [address] service program?  : No - Declined       Discharge Destination Plan[de-identified] Home  Transport at Discharge : Family      Plans at this time are home on dc with OP follow up  Cm will follow and assist in dc planning

## 2023-03-03 NOTE — ASSESSMENT & PLAN NOTE
· Reports erythema, itching on her chest as well as burning sensation in her throat; reports these are the symptoms she gets when her mast cell activation syndrome acts up  · Additionally reports that she recently inhaled birdseed and that this as well as corn seed make her symptoms worse  · Reports that she has needed previous hospitalization for epinephrine, steroids, Benadryl and IV fluids; reports that if she does not get these she can develop seizures and worsening of symptoms  · Is on Stelara injections every 3 weeks  · Given epinephrine, steroid, Benadryl, IV fluids in the ER  · On exam erythema noted on the anterior chest wall, no significant wheezing, difficult to appreciate any swelling of the tongue or lips  · We will admit to medicine observation status  Continue daily steroid, IV Benadryl as well as IV fluids

## 2023-03-03 NOTE — RESPIRATORY THERAPY NOTE
RT Protocol Note  Michelle Marquez 46 y o  female MRN: [id number]  Unit/Bed#: 386-12 Encounter: [id number]    Assessment    Principal Problem:    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[facility] )  Active Problems:    Hypothyroidism (acquired)    Crohn's disease of colon with complication ([facility] )      Home Pulmonary Medications:  PRN Xoponex inhaler   Patient denies oxygen and pap therapy       Past Medical History:   Diagnosis Date   • Anemia 2007   • Anxiety    • Asthma    • Bile duct stricture 2014    Sphincter of oddi dysfunction   • Chronic kidney disease    • Colon polyp 1998   • Crohn's colitis ([facility] )    • Deep vein thrombosis ([place] [address] )    • Disease of thyroid gland    • 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   • Generalized anxiety disorder 08/26/2017   • GERD (gastroesophageal reflux disease) 1995   • GI (gastrointestinal bleed) 1994   • Heart murmur    • Inflammatory bowel disease    • Irritable bowel syndrome 2016   • Lactose intolerance 1982   • Mast cell disease    • Migraine    • Myocardial infarction ([place] [address] )     NSTEMI  pt denies, documented in cardio note   • Pancreatitis     sphinger of    • Psychiatric disorder    • RA (rheumatoid arthritis) ([place] [address] )    • Seizures ([place] [address] )    • Ulcerative colitis ([place] [address] )    • Visual impairment      Social History     Socioeconomic History   • Marital status: /Civil Union     Spouse name: None   • Number of children: None   • Years of education: None   • Highest education level: None   Occupational History   • None   Tobacco Use   • Smoking status: Never   • Smokeless tobacco: Never   Vaping Use   • Vaping Use: Never used   Substance and Sexual Activity   • Alcohol use: Never   • Drug use: Never   • Sexual activity: Not Currently     Partners: Male     Birth control/protection: Other     Comment: Full hysterectomy   Other Topics Concern   • None   Social History Narrative   • None     Social Determinants of Health     Financial Resource Strain: Low Risk    • Difficulty of Paying Living Expenses: Not very hard   Food Insecurity: No Food Insecurity   • Worried About Running Out of Food in the Last Year: Never true   • Ran Out of Food in the Last Year: Never true   Transportation Needs: No Transportation Needs   • Lack of Transportation (Medical): No   • Lack of Transportation (Non-Medical): No   Physical Activity: Not on file   Stress: Not on file   Social Connections: Not on file   Intimate Partner Violence: Not on file   Housing Stability: Low Risk    • Unable to Pay for Housing in the Last Year: No   • Number of Places Lived in the Last Year: 1   • Unstable Housing in the Last Year: No       Subjective    Subjective Data: patient denies sob, states she is breathing well    Objective    Physical Exam:   Assessment Type: Assess only  General Appearance: Alert, Awake  Chest Assessment: Chest expansion symmetrical, Trachea midline  Bilateral Breath Sounds: Clear  Cough: Non-productive, Dry  O2 Device: room air    Vitals:  Blood pressure 125/80, pulse 93, temperature 98 3 °F (36 8 °C), temperature source Temporal, resp  rate 18, height 5' 5" (1 651 m), weight 69 9 kg (154 lb 1 6 oz), SpO2 95 %, not currently breastfeeding  Imaging and other studies: I have personally reviewed pertinent reports  O2 Device: room air     Plan    Respiratory Plan: Home Bronchodilator Patient pathway  Airway Clearance Plan: Incentive Spirometer     Resp Comments: Patient received in her room, she is on room air, she does not use oxygen or pap therapy  Patient does use a prn Xoponex MDI, she states she cannot use Albuteral  I explained we do not have the MDI version, if she needs, she could ask for the Xoponex nebulizer treatment  Patient denies sob at this time  She refuses the incentive spirometer at this time, stating she has so many of them at home  I will discontinue the respiratory protocol at this time   We can gladly re-assess the patient if needed

## 2023-03-03 NOTE — PLAN OF CARE
Problem: DISCHARGE PLANNING - CARE MANAGEMENT  Goal: Discharge to post-acute care or home with appropriate resources  Description: INTERVENTIONS:  - Conduct assessment to determine patient/family and health care team treatment goals, and need for post-acute services based on payer coverage, community resources, and patient preferences, and barriers to discharge  - Address psychosocial, clinical, and financial barriers to discharge as identified in assessment in conjunction with the patient/family and health care team  - Arrange appropriate level of post-acute services according to patient’s   needs and preference and payer coverage in collaboration with the physician and health care team  - Communicate with and update the patient/family, physician, and health care team regarding progress on the discharge plan  - Arrange appropriate transportation to post-acute venues  Outcome: Progressing     Problem: GASTROINTESTINAL - ADULT  Goal: Minimal or absence of nausea and/or vomiting  Description: INTERVENTIONS:  - Administer IV fluids if ordered to ensure adequate hydration  - Maintain NPO status until nausea and vomiting are resolved  - Nasogastric tube if ordered  - Administer ordered antiemetic medications as needed  - Provide nonpharmacologic comfort measures as appropriate  - Advance diet as tolerated, if ordered  - Consider nutrition services referral to assist patient with adequate nutrition and appropriate food choices  Outcome: Progressing     Problem: METABOLIC, FLUID AND ELECTROLYTES - ADULT  Goal: Electrolytes maintained within normal limits  Description: INTERVENTIONS:  - Monitor labs and assess patient for signs and symptoms of electrolyte imbalances  - Administer electrolyte replacement as ordered  - Monitor response to electrolyte replacements, including repeat lab results as appropriate  - Instruct patient on fluid and nutrition as appropriate  Outcome: Progressing  Goal: Fluid balance maintained  Description: INTERVENTIONS:  - Monitor labs   - Monitor I/O and WT  - Instruct patient on fluid and nutrition as appropriate  - Assess for signs & symptoms of volume excess or deficit  Outcome: Progressing  Goal: Glucose maintained within target range  Description: INTERVENTIONS:  - Monitor Blood Glucose as ordered  - Assess for signs and symptoms of hyperglycemia and hypoglycemia  - Administer ordered medications to maintain glucose within target range  - Assess nutritional intake and initiate nutrition service referral as needed  Outcome: Progressing     Problem: PAIN - ADULT  Goal: Verbalizes/displays adequate comfort level or baseline comfort level  Description: Interventions:  - Encourage patient to monitor pain and request assistance  - Assess pain using appropriate pain scale  - Administer analgesics based on type and severity of pain and evaluate response  - Implement non-pharmacological measures as appropriate and evaluate response  - Consider cultural and social influences on pain and pain management  - Notify physician/advanced practitioner if interventions unsuccessful or patient reports new pain  Outcome: Progressing

## 2023-03-03 NOTE — CONSULTS
Consultation - [facility]  Matt Carvajal 46 y o  female MRN: [id number]  Unit/Bed#: 269-71 Encounter: [id number]    Inpatient consult to gastroenterology  Consult performed by: Edil Wang PA-C  Consult ordered by: Page Valente PA-C        Reason for Consult / Principal Problem:     "mast cell activation syndrome"    ASSESSMENT AND PLAN:      47 y/o F w/ w/ pmhx sig for hereditary alpha tryptasemia, MCAS on Xolair, inflammatory bowel disease (likely Crohn's disease versus indeterminate colitis versus ulcerative colitis) previously on anti TNF therapy but complicated by a 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, immune deficiency and lack of response to pneumococcal vaccines on Hyzentra, hypothyroidism  Presented to ER with flare of mast cell activation syndrome, admitted to be monitored and treated with epinephrine, IV Solu-Medrol, IM Benadryl, IV Pepcid, and as needed Xopenex and Ativan  Her admission serologies demonstrated normal CBC, CMP, and CRP  GI consulted for IBD and worsening diarrhea  Patient with no recent fever and stable vital signs  CRP 1 2 on 3/2/2023  Inflammatory bowel disease   Mast cell activation syndrome     · Differential diagnosis for patient's etiology of patient's symptoms includes flare of inflammatory bowel disease, IBS overlap, infectious etiology, other  · She does express concern that her Stelara therapy is not adequately controlling symptoms  She was previously tried on Humira and Remicade  · At this time, discussed case with Dr Ngoc Fuentes who is patient's primary IBD gastroenterologist   He recommends checking stool and serum inflammatory markers  · Okay for diet as tolerated  · No plans for urgent endoscopic evaluation  · Okay for discharge on steroid taper for inflammatory bowel disease, patient prefers to budesonide  · Plan for close outpatient GI follow-up to discuss chronic symptoms        GI will be available should any new issues arise    ______________________________________________________________________    HPI: Patient is a 46 y o  female w/ pmhx sig for hereditary alpha tryptasemia, MCAS on Xolair, inflammatory bowel disease (likely Crohn's disease versus indeterminate colitis versus ulcerative colitis) previously on anti TNF therapy but complicated by a 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, immune deficiency and lack of response to pneumococcal vaccines on Hyzentra, hypothyroidism  Patient shares that she developed worsening diarrhea, fatigue, as well as onset of dry mouth, burning sensation in throat, and poor p o  intake  Concern for a flareup of her mast cell and presented to the emergency department  She shares that she has been dealing with poorly controlled stool output for the past couple of months, does not feel the Franny Ramos has been working as well as it once did in the past   Is concerned that it may be due to the formulation and administration route  She shares early in the week, she started to have increase in stool output with fecal urgency and loose stools, including nocturnal awakening  No bloody diarrhea  No fever  She notes nausea and dry heaving as well  She shares that she subsequently was dealing with birdseed and inhaled some of the contents, and feels that that may have tipped off her burning in her throat  She was admitted to the hospital and had serologic evaluation with normal Hb at 40 6, platelets 869, WBC count 8 01, BUN 12, creatinine 0 63, AST 19, ALT 16, ALP 51, albumin 4 1, T  bili 0 29, CRP 1 2, COVID and flu testing negative  She was started on her mast cell protocol including epi, Solu-Medrol, Benadryl, Pepcid, fluids, as needed Xopenex and Ativan  At bedside evaluation on 3/3/2023, patient notes her itchiness and skin erythema is improving  The burning sensation in her throat is also improving  She still notes poor appetite    She is concerned because she has plans to take a trip and is worried that her GI symptoms are not adequately controlled  She describes numerous urgent loose stools daily that "smell of colitis"  Endoscopic history:   EGD: Mild, patchy erythematous mucosa with erosion in the stomach; performed cold forceps biopsy to rule out H  Pylori; normal esopahgus and duodenum   A  Duodenum, biopsy: Small intestinal mucosa with no significant histopathologic change   No evidence of celiac disease  B  Stomach, biopsy: Gastric antral and oxyntic mucosa with minimal chronic inactive gastritis  Negative for intestinal metaplasia and dysplasia   Negative for Helicobacter pylori-type organisms on H&E stain  C  Esophagus, distal, biopsy: Squamous mucosa with no significant histopathologic change  No evidence of esophagitis or increase in eosinophils  D  Esophagus, proximal, biopsy: Squamous mucosa with no significant histopathologic change  No evidence of esophagitis or increase in eosinophils  Colon: 10/2021: The terminal ileum and entire colon appeared normal  Performed random biopsy  D  Terminal Ileum, biopsy: Benign small intestinal mucosa with prominent lymphoid aggregate  Negative for active ileitis, dysplasia or carcinoma  E  Large Intestine, Right/Ascending Colon, biopsy: Benign colonic mucosa  Negative for active colitis, dysplasia or carcinoma  F  Large Intestine, Transverse Colon, biopsy: Benign colonic mucosa  Negative for active colitis, dysplasia or carcinoma  G  Large Intestine, Left/Descending Colon, biopsy: Patchy, chronic active colitis  Negative for dysplasia or carcinoma  H  Rectum, biopsy: Chronic quiescent colitis  Negative for dysplasia or carcinoma    Review of Systems   Constitutional: Positive for appetite change and fatigue  Negative for fever and unexpected weight change  Gastrointestinal: Positive for abdominal pain, diarrhea, nausea and vomiting  Negative for blood in stool  Skin: Positive for rash     Otherwise PER HPI     Historical Information   Past Medical History:   Diagnosis Date   • Anemia 2007   • Anxiety    • Asthma    • Bile duct stricture 2014    Sphincter of oddi dysfunction   • Chronic kidney disease    • Colon polyp 1998   • Crohn's colitis ([facility] [address] )    • Deep vein thrombosis ([facility] )    • Disease of thyroid gland    • 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   • Generalized anxiety disorder 08/26/2017   • GERD (gastroesophageal reflux disease) 1995   • GI (gastrointestinal bleed) 1994   • Heart murmur    • Inflammatory bowel disease    • Irritable bowel syndrome 2016   • Lactose intolerance 1982   • Mast cell disease    • Migraine    • Myocardial infarction [facility])     NSTEMI  pt denies, documented in cardio note   • Pancreatitis     sphinger of    • Psychiatric disorder    • RA (rheumatoid arthritis) ([facility] )    • Seizures ([facility] )    • Ulcerative colitis ([place] [address] )    • Visual impairment      Past Surgical History:   Procedure Laterality Date   • ABDOMINAL SURGERY  2017   • APPENDECTOMY     • CHOLECYSTECTOMY     • COLONOSCOPY  2019   • EGD AND COLONOSCOPY N/A 09/12/2017    Procedure: EGD AND COLONOSCOPY;  Surgeon: Remedios Barrera MD;  [facility]: [facility]; Service: Gastroenterology   • ERCP N/A 09/15/2017    Procedure: ENDOSCOPIC RETROGRADE CHOLANGIOPANCREATOGRAPHY (ERCP); Surgeon: Sarah Love MD;  Location[facility];   Service: Gastroenterology   • HYSTERECTOMY     • KNEE SURGERY Right    • LAPAROSCOPIC ENDOMETRIOSIS FULGURATION     • ORIF ANKLE FRACTURE Left    • MI ARTHRS KNE SURG W/MENISCECTOMY MED/LAT W/SHVG Left 05/12/2017    Procedure: POSSIBLE MEDIAL MENISECTOMY;  Surgeon: Lucía Dang MD;  [facility];  Service: Orthopedics   • MI ARTHRS KNEE DEBRIDEMENT/SHAVING ARTCLR CRTLG Left 05/12/2017    Procedure: KNEE ARTHROSCOPY EVALUATION, CHONDROPLASTY;  Surgeon: Lucía Dang MD;  [facility];  Service: Orthopedics   • MI LAPS ABD PRTM&OMENTUM DX W/WO SPEC BR/WA SPX N/A 12/12/2017    Procedure: LAPAROSCOPY DIAGNOSTIC WITH LYSIS OF ADHESIONS;  Surgeon: Fredy Nevarez DO;  [facility];  Service: General   • UPPER GASTROINTESTINAL ENDOSCOPY  2019     Social History   Social History     Substance and Sexual Activity   Alcohol Use Never     Social History     Substance and Sexual Activity   Drug Use Never     Social History     Tobacco Use   Smoking Status Never   Smokeless Tobacco Never     Family History   Problem Relation Age of Onset   • Ulcerative colitis Mother    • Arthritis Mother    • Colon polyps Mother    • Heart failure Father    • Neuropathy Father    • Macular degeneration Father    • Diabetes Father    • Early death Father    • Vision loss Father    • Asthma Daughter    • Hypothyroidism Daughter    • Heart disease Maternal Grandmother    • Arthritis Maternal Grandmother    • No Known Problems Maternal Grandfather    • Arthritis Paternal Grandmother    • Macular degeneration Paternal Grandmother    • Vision loss Paternal Grandmother    • Lymphoma Paternal Grandfather    • Cancer Paternal Grandfather    • Early death Paternal Grandfather    • Breast cancer Maternal Aunt    • Cancer Maternal Aunt    • Miscarriages / Stillbirths Maternal Aunt    • Heart failure Paternal Aunt    • Heart failure Paternal Aunt    • Irritable bowel syndrome Paternal [de-identified]    • Breast cancer Paternal Aunt    • Breast cancer additional onset Paternal [de-identified]    • Hypothyroidism Paternal Aunt    • Cancer Paternal Aunt    • No Known Problems Son    • No Known Problems Son    • Kidney disease Brother    • Depression Paternal Uncle    • Early death Paternal Uncle        Meds/Allergies     Medications Prior to Admission   Medication   • Alfalfa 650 MG TABS   • B-D 3CC LUER-SHAMEKA SYR 25GX1" 25G X 1" 3 ML MISC   • budesonide (ENTOCORT EC) 3 MG capsule   • BUDESONIDE PO   • butalbital-acetaminophen-caffeine (FIORICET,ESGIC) -40 mg per tablet   • cetirizine (ZyrTEC) 10 mg tablet   • Cholecalciferol 10 MCG /0 025ML LIQD   • cyanocobalamin 1,000 mcg/mL   • diclofenac (VOLTAREN) 75 mg EC tablet   • diphenhydrAMINE (BENADRYL) 25 mg tablet   • EPINEPHrine (EpiPen 2-Malachi) 0 3 mg/0 3 mL SOAJ   • ergocalciferol (VITAMIN D2) 50,000 units   • famotidine (PEPCID) 40 MG tablet   • furosemide (LASIX) 20 mg tablet   • hydrOXYzine HCL (ATARAX) 25 mg tablet   • Lactobacillus (PROBIOTIC ACIDOPHILUS PO)   • Lactobacillus-Inulin (Culturelle [employer]) CAPS   • levalbuterol (XOPENEX HFA) 45 mcg/act inhaler   • levothyroxine 100 mcg tablet   • liothyronine (CYTOMEL) 5 mcg tablet   • LORazepam (ATIVAN) 1 mg tablet   • montelukast (SINGULAIR) 10 mg tablet   • NON FORMULARY   • nystatin (MYCOSTATIN) 500,000 units/5 mL suspension   • omalizumab (XOLAIR) 150 mg   • pantoprazole (PROTONIX) 40 mg tablet   • predniSONE 20 mg tablet   • Riboflavin (Vitamin B-2) 100 MG TABS   • simethicone (MYLICON) 305 MG chewable tablet   • Stelara 90 MG/ML subcutaneous injection   • Synthroid 50 MCG tablet   • Synthroid 75 MCG tablet   • TechLite Lancets [facility]     Current Facility-Administered Medications   Medication Dose Route Frequency   • diphenhydrAMINE (BENADRYL) 50 mg in sodium chloride 0 9 % 50 mL IVPB  50 mg Intravenous Q4H PRN   • diphenhydrAMINE (BENADRYL) injection 50 mg  50 mg Intramuscular Q6H PRN   • EPINEPHrine PF (ADRENALIN) 1 mg/mL injection 0 3 mg  0 3 mg Intramuscular Once   • Famotidine (PF) (PEPCID) injection 20 mg  20 mg Intravenous Q12H [address]   • HYDROmorphone (DILAUDID) injection 0 5 mg  0 5 mg Intravenous Q6H PRN    Or   • HYDROmorphone (DILAUDID) injection 1 mg  1 mg Intravenous Q6H PRN   • hydrOXYzine HCL (ATARAX) tablet 25 mg  25 mg Oral 4x Daily   • levalbuterol (XOPENEX) inhalation solution 1 25 mg  1 25 mg Nebulization Q4H PRN   • levothyroxine tablet 100 mcg  100 mcg Oral Every Other Day   • LORazepam (ATIVAN) injection 1 mg  1 mg Intravenous Q6H PRN   • methylPREDNISolone sodium succinate (Solu-MEDROL) injection 80 mg  80 mg Intravenous Daily   • sodium chloride 0 9 % infusion  125 mL/hr Intravenous Continuous       Allergies   Allergen Reactions   • Aimovig [Erenumab-Aooe] Anaphylaxis   • Amitriptyline Anaphylaxis     According to the patient she had nphylaxis   • Aspergillus Allergy Skin Test Other (See Comments), Hives and Swelling   • Azathioprine Other (See Comments) and Anaphylaxis     pancreatitis  According to patient she needed Epipen   • Buspar [Buspirone] Hives and Shortness Of Breath   • Corn Dextrin Anaphylaxis     Any corn based products   • Eggs Or Egg-Derived Products - Food Allergy Anaphylaxis   • Gelatin - Food Allergy Anaphylaxis   • Heparin Hives     Painful welts    • Lactated Ringers Shortness Of Breath     Pt questions this allergy, pt has received LR multiple times without reaction   • Lavender Oil Anaphylaxis     Other reaction(s): anaphalxis   • Malto Dextrin [Dextrin] Anaphylaxis   • Other Anaphylaxis     Gel caps, maltodextrose, dextrose,grapes, lavender    • Penicillium Notatum Shortness Of Breath and Swelling   • Sumatriptan Anaphylaxis     Bradycardia, sob, back pressure, head pain,throat tightness  According to the patient she had anphylaxis   • Contrast [Iodinated Contrast Media] Other (See Comments)     Pt states needs to be premedicated prior to injection   • Corn Oil - Food Allergy - Food Allergy Hives   • Humira [Adalimumab] Other (See Comments)     "I develop drug induced lupus"   • Ibuprofen Hives and Throat Swelling   • Polyethylene Glycol Diarrhea and Vomiting   • Red Dye - Food Allergy Other (See Comments)     intolerance   • Remicade [Infliximab] Other (See Comments)     "I develop drug induced lupus"   • Sulfa Antibiotics Hives and Other (See Comments)   • Sulfate Hives   • Sulfites - Food Allergy Hives   • Chlorhexidine Itching   • Histamine Hives, Rash and Other (See Comments)     Intolerance             Objective     Blood pressure 125/81, pulse 91, temperature 98 3 °F (36 8 °C), temperature source Temporal, resp   rate 19, height 5' 5" (1 651 m), weight 69 9 kg (154 lb 1 6 oz), SpO2 97 %, not currently breastfeeding  Body mass index is 25 64 kg/m²  Intake/Output Summary (Last 24 hours) at 3/3/2023 1041  Last data filed at 3/3/2023 0807  Gross per 24 hour   Intake 0 ml   Output --   Net 0 ml     Physical Exam  Vitals and nursing note reviewed  HENT:      Head: Normocephalic and atraumatic  Eyes:      General: No scleral icterus  Conjunctiva/sclera: Conjunctivae normal    Cardiovascular:      Rate and Rhythm: Normal rate  Pulmonary:      Effort: Pulmonary effort is normal  No respiratory distress  Breath sounds: Normal breath sounds  Abdominal:      General: Abdomen is flat  Bowel sounds are normal  There is no distension  Palpations: Abdomen is soft  Tenderness: There is no abdominal tenderness  There is no guarding or rebound  Skin:     General: Skin is warm and dry  Coloration: Skin is not jaundiced  Neurological:      General: No focal deficit present  Mental Status: She is alert and oriented to person, place, and time     Psychiatric:         Mood and Affect: Mood normal          Behavior: Behavior normal         Lab Results:   Admission on 03/02/2023   Component Date Value   • WBC 03/02/2023 7 67    • RBC 03/02/2023 4 51    • Hemoglobin 03/02/2023 13 8    • Hematocrit 03/02/2023 41 4    • MCV 03/02/2023 92    • MCH 03/02/2023 30 6    • MCHC 03/02/2023 33 3    • RDW 03/02/2023 14 6    • MPV 03/02/2023 9 0    • Platelets 98/85/3732 518 (H)    • nRBC 03/02/2023 0    • Neutrophils Relative 03/02/2023 90 (H)    • Immat GRANS % 03/02/2023 0    • Lymphocytes Relative 03/02/2023 8 (L)    • Monocytes Relative 03/02/2023 2 (L)    • Eosinophils Relative 03/02/2023 0    • Basophils Relative 03/02/2023 0    • Neutrophils Absolute 03/02/2023 6 85    • Immature Grans Absolute 03/02/2023 0 02    • Lymphocytes Absolute 03/02/2023 0 61    • Monocytes Absolute 03/02/2023 0 16 (L)    • Eosinophils Absolute 03/02/2023 0 00    • Basophils Absolute 03/02/2023 0 03    • Sodium 03/02/2023 136    • Potassium 03/02/2023 4 1    • Chloride 03/02/2023 106    • CO2 03/02/2023 21    • ANION GAP 03/02/2023 9    • BUN 03/02/2023 18    • Creatinine 03/02/2023 0 77    • Glucose 03/02/2023 211 (H)    • Calcium 03/02/2023 9 1    • AST 03/02/2023 17    • ALT 03/02/2023 17    • Alkaline Phosphatase 03/02/2023 51    • Total Protein 03/02/2023 7 2    • Albumin 03/02/2023 4 1    • Total Bilirubin 03/02/2023 0 26    • eGFR 03/02/2023 90    • SARS-CoV-2 03/02/2023 Negative    • INFLUENZA A PCR 03/02/2023 Negative    • INFLUENZA B PCR 03/02/2023 Negative    • RSV PCR 03/02/2023 Negative    • POC Glucose 03/03/2023 155 (H)        Imaging Studies: I have personally reviewed pertinent imaging studies  Archie Danielle PA-C    **Please note:  Dictation voice to text software may have been used in the creation of this record  Occasional wrong word or “sound alike” substitutions may have occurred due to the inherent limitations of voice recognition software  Read the chart carefully and recognize, using context, where substitutions have occurred  **

## 2023-03-03 NOTE — PLAN OF CARE
Problem: DISCHARGE PLANNING - CARE MANAGEMENT  Goal: Discharge to post-acute care or home with appropriate resources  Description: INTERVENTIONS:  - Conduct assessment to determine patient/family and health care team treatment goals, and need for post-acute services based on payer coverage, community resources, and patient preferences, and barriers to discharge  - Address psychosocial, clinical, and financial barriers to discharge as identified in assessment in conjunction with the patient/family and health care team  - Arrange appropriate level of post-acute services according to patient’s   needs and preference and payer coverage in collaboration with the physician and health care team  - Communicate with and update the patient/family, physician, and health care team regarding progress on the discharge plan  - Arrange appropriate transportation to post-acute venues  Outcome: Progressing     Problem: GASTROINTESTINAL - ADULT  Goal: Minimal or absence of nausea and/or vomiting  Description: INTERVENTIONS:  - Administer IV fluids if ordered to ensure adequate hydration  - Maintain NPO status until nausea and vomiting are resolved  - Nasogastric tube if ordered  - Administer ordered antiemetic medications as needed  - Provide nonpharmacologic comfort measures as appropriate  - Advance diet as tolerated, if ordered  - Consider nutrition services referral to assist patient with adequate nutrition and appropriate food choices  Outcome: Progressing     Problem: METABOLIC, FLUID AND ELECTROLYTES - ADULT  Goal: Electrolytes maintained within normal limits  Description: INTERVENTIONS:  - Monitor labs and assess patient for signs and symptoms of electrolyte imbalances  - Administer electrolyte replacement as ordered  - Monitor response to electrolyte replacements, including repeat lab results as appropriate  - Instruct patient on fluid and nutrition as appropriate  Outcome: Progressing  Goal: Fluid balance maintained  Description: INTERVENTIONS:  - Monitor labs   - Monitor I/O and WT  - Instruct patient on fluid and nutrition as appropriate  - Assess for signs & symptoms of volume excess or deficit  Outcome: Progressing  Goal: Glucose maintained within target range  Description: INTERVENTIONS:  - Monitor Blood Glucose as ordered  - Assess for signs and symptoms of hyperglycemia and hypoglycemia  - Administer ordered medications to maintain glucose within target range  - Assess nutritional intake and initiate nutrition service referral as needed  Outcome: Progressing

## 2023-03-04 LAB
ANION GAP SERPL CALCULATED.3IONS-SCNC: 10 MMOL/L (ref 4–13)
BUN SERPL-MCNC: 9 MG/DL (ref 5–25)
CALCIUM SERPL-MCNC: 8.4 MG/DL (ref 8.4–10.2)
CHLORIDE SERPL-SCNC: 106 MMOL/L (ref 96–108)
CO2 SERPL-SCNC: 23 MMOL/L (ref 21–32)
CREAT SERPL-MCNC: 0.61 MG/DL (ref 0.6–1.3)
ERYTHROCYTE [DISTWIDTH] IN BLOOD BY AUTOMATED COUNT: 15.2 % (ref 11.6–15.1)
GFR SERPL CREATININE-BSD FRML MDRD: 106 ML/MIN/1.73SQ M
GLUCOSE SERPL-MCNC: 125 MG/DL (ref 65–140)
GLUCOSE SERPL-MCNC: 65 MG/DL (ref 65–140)
GLUCOSE SERPL-MCNC: 88 MG/DL (ref 65–140)
GLUCOSE SERPL-MCNC: 91 MG/DL (ref 65–140)
HCT VFR BLD AUTO: 39.6 % (ref 34.8–46.1)
HGB BLD-MCNC: 13 G/DL (ref 11.5–15.4)
MCH RBC QN AUTO: 30.4 PG (ref 26.8–34.3)
MCHC RBC AUTO-ENTMCNC: 32.8 G/DL (ref 31.4–37.4)
MCV RBC AUTO: 93 FL (ref 82–98)
PLATELET # BLD AUTO: 384 THOUSANDS/UL (ref 149–390)
PMV BLD AUTO: 10.7 FL (ref 8.9–12.7)
POTASSIUM SERPL-SCNC: 3.6 MMOL/L (ref 3.5–5.3)
RBC # BLD AUTO: 4.27 MILLION/UL (ref 3.81–5.12)
SODIUM SERPL-SCNC: 139 MMOL/L (ref 135–147)
WBC # BLD AUTO: 11.76 THOUSAND/UL (ref 4.31–10.16)

## 2023-03-04 RX ORDER — DIPHENHYDRAMINE HYDROCHLORIDE 50 MG/ML
50 INJECTION INTRAMUSCULAR; INTRAVENOUS EVERY 4 HOURS
Status: DISCONTINUED | OUTPATIENT
Start: 2023-03-04 | End: 2023-03-10 | Stop reason: HOSPADM

## 2023-03-04 RX ORDER — MAGNESIUM SULFATE 1 G/100ML
1 INJECTION INTRAVENOUS ONCE
Status: COMPLETED | OUTPATIENT
Start: 2023-03-04 | End: 2023-03-04

## 2023-03-04 RX ORDER — FAMOTIDINE 10 MG/ML
20 INJECTION, SOLUTION INTRAVENOUS EVERY 12 HOURS SCHEDULED
Status: DISCONTINUED | OUTPATIENT
Start: 2023-03-04 | End: 2023-03-10

## 2023-03-04 RX ORDER — METHYLPREDNISOLONE SODIUM SUCCINATE 125 MG/2ML
60 INJECTION, POWDER, LYOPHILIZED, FOR SOLUTION INTRAMUSCULAR; INTRAVENOUS EVERY 12 HOURS SCHEDULED
Status: DISCONTINUED | OUTPATIENT
Start: 2023-03-04 | End: 2023-03-10

## 2023-03-04 RX ORDER — POTASSIUM CHLORIDE 14.9 MG/ML
20 INJECTION INTRAVENOUS ONCE
Status: COMPLETED | OUTPATIENT
Start: 2023-03-04 | End: 2023-03-04

## 2023-03-04 RX ADMIN — LORAZEPAM 1 MG: 2 INJECTION INTRAMUSCULAR; INTRAVENOUS at 07:45

## 2023-03-04 RX ADMIN — LORAZEPAM 1 MG: 2 INJECTION INTRAMUSCULAR; INTRAVENOUS at 20:12

## 2023-03-04 RX ADMIN — DIPHENHYDRAMINE HYDROCHLORIDE 25 MG: 50 INJECTION, SOLUTION INTRAMUSCULAR; INTRAVENOUS at 02:10

## 2023-03-04 RX ADMIN — SODIUM CHLORIDE 125 ML/HR: 0.9 INJECTION, SOLUTION INTRAVENOUS at 10:36

## 2023-03-04 RX ADMIN — POTASSIUM CHLORIDE 20 MEQ: 14.9 INJECTION, SOLUTION INTRAVENOUS at 13:23

## 2023-03-04 RX ADMIN — FAMOTIDINE 20 MG: 10 INJECTION, SOLUTION INTRAVENOUS at 08:01

## 2023-03-04 RX ADMIN — HYDROMORPHONE HYDROCHLORIDE 1 MG: 1 INJECTION, SOLUTION INTRAMUSCULAR; INTRAVENOUS; SUBCUTANEOUS at 14:03

## 2023-03-04 RX ADMIN — MAGNESIUM SULFATE HEPTAHYDRATE 1 G: 1 INJECTION, SOLUTION INTRAVENOUS at 17:28

## 2023-03-04 RX ADMIN — DIPHENHYDRAMINE HYDROCHLORIDE 50 MG: 50 INJECTION, SOLUTION INTRAMUSCULAR; INTRAVENOUS at 12:29

## 2023-03-04 RX ADMIN — NYSTATIN 500000 UNITS: 100000 SUSPENSION ORAL at 13:23

## 2023-03-04 RX ADMIN — LORAZEPAM 1 MG: 2 INJECTION INTRAMUSCULAR; INTRAVENOUS at 14:03

## 2023-03-04 RX ADMIN — NYSTATIN 500000 UNITS: 100000 SUSPENSION ORAL at 23:06

## 2023-03-04 RX ADMIN — HYDROMORPHONE HYDROCHLORIDE 1 MG: 1 INJECTION, SOLUTION INTRAMUSCULAR; INTRAVENOUS; SUBCUTANEOUS at 07:35

## 2023-03-04 RX ADMIN — FAMOTIDINE 20 MG: 10 INJECTION, SOLUTION INTRAVENOUS at 20:14

## 2023-03-04 RX ADMIN — HYDROMORPHONE HYDROCHLORIDE 1 MG: 1 INJECTION, SOLUTION INTRAMUSCULAR; INTRAVENOUS; SUBCUTANEOUS at 20:21

## 2023-03-04 RX ADMIN — DIPHENHYDRAMINE HYDROCHLORIDE 50 MG: 50 INJECTION, SOLUTION INTRAMUSCULAR; INTRAVENOUS at 20:13

## 2023-03-04 RX ADMIN — SODIUM CHLORIDE 125 ML/HR: 0.9 INJECTION, SOLUTION INTRAVENOUS at 02:11

## 2023-03-04 RX ADMIN — DIPHENHYDRAMINE HYDROCHLORIDE 50 MG: 50 INJECTION, SOLUTION INTRAMUSCULAR; INTRAVENOUS at 16:24

## 2023-03-04 RX ADMIN — METHYLPREDNISOLONE SODIUM SUCCINATE 80 MG: 125 INJECTION, POWDER, FOR SOLUTION INTRAMUSCULAR; INTRAVENOUS at 08:01

## 2023-03-04 RX ADMIN — METHYLPREDNISOLONE SODIUM SUCCINATE 60 MG: 125 INJECTION, POWDER, FOR SOLUTION INTRAMUSCULAR; INTRAVENOUS at 20:12

## 2023-03-04 RX ADMIN — SODIUM CHLORIDE 125 ML/HR: 0.9 INJECTION, SOLUTION INTRAVENOUS at 17:31

## 2023-03-04 RX ADMIN — DIPHENHYDRAMINE HYDROCHLORIDE 50 MG: 50 INJECTION, SOLUTION INTRAMUSCULAR; INTRAVENOUS at 07:44

## 2023-03-04 RX ADMIN — NYSTATIN 500000 UNITS: 100000 SUSPENSION ORAL at 16:32

## 2023-03-04 NOTE — UTILIZATION REVIEW
Initial Clinical Review    OBSERVATION 3/3 @ 0101 CHANGED TO INPATIENT ON SAME DAY 3/3 @ 1542 D/T WORSENING OF SYMPTOMS    Admission: Date/Time/Statement:   Admission Orders (From admission, onward)     Ordered        03/03/23 1542  Inpatient Admission  Once                      Orders Placed This Encounter   Procedures   • Inpatient Admission     Standing Status:   Standing     Number of Occurrences:   1     Order Specific Question:   Level of Care     Answer:   Med Surg [16]     Order Specific Question:   Estimated length of stay     Answer:   More than 2 Midnights     Order Specific Question:   Certification     Answer:   I certify that inpatient services are medically necessary for this patient for a duration of greater than two midnights  See H&P and MD Progress Notes for additional information about the patient's course of treatment  ED Arrival Information     Expected   -    Arrival   3/2/2023 21:47    Acuity   Urgent            Means of arrival   Walk-In    Escorted by   Self    Service   Hospitalist    Admission type   Emergency            Arrival complaint   weakness           Chief Complaint   Patient presents with   • Diarrhea     Pt states inhaled birdseed and since then has had diarrhea no saliva and having a allergic reaction and has no epi pen at home       Initial Presentation: 46 y o  female presents to the ED from home with c/o erythema and itching of her anterior chest wall, burning sensation in her throat as well as occasional vomiting and diarrhea - symptoms she gets when mast call activation syndrome flares  Also had recent changes to Stelara with worsening symptoms and she may have inhaled birdseed recently  PMH: 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 when 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 in a flare, Crohn's  In the ED labs - elevated CO2, glucose 211    On exam lungs clear, erythema noted on the anterior chest wall, no significant wheezing, difficult to appreciate any swelling of the tongue or lips   She was treated with IV fluids, IV Benadryl, IV Epi, IV Zofran, IV SoluMedrol, IV Ativan  She is admitted to OBSERVATION status and then changed to INPATIENT status with Mast cell activation syndrome - IV steroids, IV Benadryl, IV fluids  Post admit note: hives are worsening and she has increased itching  3/3 GI Consult - presents with flare of her mast cell activation syndrome and was started on epinephrine, IV Solu-Medrol, IV Benadryl, IV Pepcid, and Xopenex and Ativan  Check her fecal calprotectin, stool cultures, C-reactive protein and monitor her GI symptoms, continue steroid taper and plan for outpatient follow-up to discuss changing her biologic therapy  Has multiple medical problems including inflammatory bowel disease and mast cell activation syndrome  Based on her history and previous procedures, she probably has Crohn's colitis although she may have ulcerative colitis  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 and is currently on Stelara but continues to have symptoms and signs of active Crohn's colitis  Date: 3/4   Day 2:   Continues on Mast cell treatment from OP Allergist - IV steroids BID, IV Benadryl q 4 hr, IV Pepcid BID, PRN IV Ativan, PRN Xopenex nebs q 4 Hr, IV fluids  On exam today - Continued erythematous and pruritic rash all over the body slightly decreased with the solumedrol/benadryl/pepcid cocktail at 8 am this morning  The 3 of them combined at the same time really helped her with the itching  No sob  She feels fuzzy coating on the tongue no odynophagia  Limited po intake because of dietary restrictions with corn exposure    No abd pain/n/v  Her colitis type loose stools have improved today, non-bloody        ED Triage Vitals   Temperature Pulse Respirations Blood Pressure SpO2   03/02/23 2204 03/02/23 2204 03/02/23 2204 03/02/23 2204 03/02/23 2204   97 6 °F (36 4 °C) (!) 106 18 133/85 99 %      Temp Source Heart Rate Source Patient Position - Orthostatic VS BP Location FiO2 (%)   03/02/23 2204 03/02/23 2204 03/02/23 2204 03/02/23 2204 --   Temporal Monitor Sitting Right arm       Pain Score       03/03/23 0345       7          Wt Readings from Last 1 Encounters:   03/03/23 69 9 kg (154 lb 1 6 oz)     Additional Vital Signs:   03/04/23 07:32:20 97 4 °F (36 3 °C) Abnormal  74 -- 140/99 113 100 % -- --   03/03/23 22:09:35 98 3 °F (36 8 °C) 80 -- 141/95 110 100 % -- --   03/03/23 2100 -- -- -- -- -- 100 % None (Room air) --   03/03/23 1609 -- 93 18 -- -- 95 % -- --   03/03/23 14:08:35 -- 93 19 125/80 95 95 % -- --   03/03/23 0820 -- -- -- -- -- -- None (Room air) --   03/03/23 07:24:30 -- 91 19 125/81 96 97 % -- --   03/03/23 03:54:37 98 3 °F (36 8 °C) 89 18 120/77 91 96 % None (Room air) Lying     Pertinent Labs/Diagnostic Test Results:   None done    Results from last 7 days   Lab Units 03/02/23 2328   SARS-COV-2  Negative     Results from last 7 days   Lab Units 03/04/23 0617 03/03/23 1054 03/02/23 2328 03/02/23  1637   WBC Thousand/uL 11 76* 12 10* 7 67 8 01   HEMOGLOBIN g/dL 13 0 13 5 13 8 14 6   HEMATOCRIT % 39 6 40 6 41 4 42 9   PLATELETS Thousands/uL 384 498* 518* 508*   NEUTROS ABS Thousands/µL  --  10 10* 6 85 7 22         Results from last 7 days   Lab Units 03/04/23 0617 03/03/23 1054 03/02/23 2328 03/02/23  1637   SODIUM mmol/L 139 140 136 138   POTASSIUM mmol/L 3 6 3 5 4 1 3 8   CHLORIDE mmol/L 106 108 106 108   CO2 mmol/L 23 20* 21 20*   ANION GAP mmol/L 10 12 9 10   BUN mg/dL 9 7 18 12   CREATININE mg/dL 0 61 0 61 0 77 0 63   EGFR ml/min/1 73sq m 106 106 90 104   CALCIUM mg/dL 8 4 8 8 9 1 8 6   MAGNESIUM mg/dL  --  1 9  --   --      Results from last 7 days   Lab Units 03/03/23  1054 03/02/23  2328 03/02/23  1637   AST U/L 13 17 19   ALT U/L 15 17 16   ALK PHOS U/L 43 51 51   TOTAL PROTEIN g/dL 6 9 7 2 7 3   ALBUMIN g/dL 4 0 4 1 4 1   TOTAL BILIRUBIN mg/dL 0 42 0 26 0 29     Results from last 7 days   Lab Units 03/04/23  0756 03/03/23  2203 03/03/23  1645 03/03/23  1121 03/03/23  0724   POC GLUCOSE mg/dl 65 207* 127 185* 155*     Results from last 7 days   Lab Units 03/04/23  0617 03/03/23  1054 03/02/23  2328 03/02/23  1637   GLUCOSE RANDOM mg/dL 91 190* 211* 116         Results from last 7 days   Lab Units 03/03/23  1054   PROTIME seconds 12 8   INR  0 94   PTT seconds 23     Results from last 7 days   Lab Units 03/02/23  1637   CRP mg/L 1 2                 Results from last 7 days   Lab Units 03/02/23  2328   INFLUENZA A PCR  Negative   INFLUENZA B PCR  Negative   RSV PCR  Negative     ED Treatment:   Medication Administration from 03/02/2023 2147 to 03/03/2023 0321       Date/Time Order Dose Route Action     03/02/2023 2345 EST sodium chloride 0 9 % bolus 1,000 mL 1,000 mL Intravenous New Bag     03/02/2023 2347 EST diphenhydrAMINE (BENADRYL) injection 50 mg 50 mg Intravenous Given     03/02/2023 2353 EST EPINEPHrine PF (ADRENALIN) 1 mg/mL injection 0 3 mg 0 3 mg Intramuscular Given     03/02/2023 2348 EST ondansetron (ZOFRAN) injection 4 mg 4 mg Intravenous Given     03/02/2023 2348 EST methylPREDNISolone sodium succinate (Solu-MEDROL) injection 80 mg 80 mg Intravenous Given     03/02/2023 2347 EST LORazepam (ATIVAN) injection 0 5 mg 0 5 mg Intravenous Given        Past Medical History:   Diagnosis Date   • Anemia 2007   • Anxiety    • Asthma    • Bile duct stricture 2014    Sphincter of oddi dysfunction   • Chronic kidney disease    • Colon polyp 1998   • Crohn's colitis ([facility] )    • Deep vein thrombosis ([facility] )    • Disease of thyroid gland    • 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   • Generalized anxiety disorder 08/26/2017   • GERD (gastroesophageal reflux disease) 1995   • GI (gastrointestinal bleed) 1994   • Heart murmur    • Inflammatory bowel disease    • Irritable bowel syndrome 2016   • Lactose intolerance 1982   • Mast cell disease    • Migraine    • Myocardial infarction [facility])     NSTEMI  pt denies, documented in cardio note   • Pancreatitis     sphinger of    • Psychiatric disorder    • RA [facility]) [facility] )    • Seizures (HCC)    • Ulcerative colitis ([facility] )    • Visual impairment      Present on Admission:  • 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HCC)  • Crohn's disease of colon with complication ([facility] )  • Hypothyroidism (acquired)      Admitting Diagnosis: Diarrhea [R19 7]  Nausea & vomiting [R11 2]  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[place] [address] ) [D89 40]  Age/Sex: 46 y o  female  Admission Orders:  Scheduled Medications:  EPINEPHrine PF, 0 3 mg, Intramuscular, Once  famotidine, 20 mg, Intravenous, Q12H KARISSA  hydrOXYzine HCL, 25 mg, Oral, 4x Daily  levothyroxine, 100 mcg, Oral, Every Other Day  methylPREDNISolone sodium succinate, 80 mg, Intravenous, Daily      Continuous IV Infusions:  sodium chloride, 125 mL/hr, Intravenous, Continuous      PRN Meds:  diphenhydrAMINE, 50 mg, Intramuscular, Q4H PRN - x 3 3/3, x 1 3/4  HYDROmorphone, 0 5 mg, Intravenous, Q6H PRN   Or  HYDROmorphone, 1 mg, Intravenous, Q6H PRN - x 3 3/3, x 1 3/4  levalbuterol, 1 25 mg, Nebulization, Q4H PRN  LORazepam, 1 mg, Intravenous, Q6H PRN - x2 3/3, x 1 3/4    Fecal calprotectin  Tryptase  Ambulate  POC GLUCOSE AC/HS WITH SSI COVERAGE   IP CONSULT TO GASTROENTEROLOGY    Network Utilization Review Department  ATTENTION: Please call with any questions or concerns to [phone] and carefully listen to the prompts so that you are directed to the right person  All voicemails are confidential   Joe Cheney all requests for admission clinical reviews, approved or denied determinations and any other requests to dedicated fax number below belonging to the campus where the patient is receiving treatment  List of dedicated fax numbers for the Facilities:  [address] [place] [address] DENIALS (Administrative/Medical Necessity) [phone]   [address] (Maternity/NICU/Pediatrics) [phone]   [address] [phone]     [address] [facility] [address] [phone]   [address] [place] [address] [place] [address] 310 [facility] 134 [address] [facility] [phone]

## 2023-03-04 NOTE — ASSESSMENT & PLAN NOTE
Reports erythema, itching on her chest as well as burning sensation in her throat; reports these are the symptoms she gets when her mast cell activation syndrome acts up    · Started patient on the regimen list she has from her Outpatient allergist   · IV solu medrol 60 mg q12h 8 am and 8 pm  · IM benadryl 50 mg every 4 h  · IV Pepcid 20 mg q 12 hours 8 am and 8 pm  · PRN ativan 1 mg IV every 6 hours prn  · Xopenex neb every 4 hours prn  · NS IV fluids 125 mL/hr  · Continue to monitor sxs

## 2023-03-04 NOTE — ASSESSMENT & PLAN NOTE
· On Stelara injections every 3 weeks  · GI consulted; check her fecal calprotectin and stool cultures (yet to be collected) as well as C-reactive protein and monitor her GI symptoms  [id number] Lyndsay Wu for steroid taper with budesonide 9 mg qd on dc  Continue op fu to discuss biologic therapy

## 2023-03-04 NOTE — PROGRESS NOTES
[address] [facility] [address] [place]  Progress Note - Joselo Nguyen 1975, 46 y o  female MRN: [id number]  Unit/Bed#: 937-89 Encounter: [id number]  Primary Care Provider: Ruben Dacosta DO   Date and time admitted to hospital: 3/2/2023 10:00 PM    * 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HCC)  Assessment & Plan  Reports erythema, itching on her chest as well as burning sensation in her throat; reports these are the symptoms she gets when her mast cell activation syndrome acts up  · Started patient on the regimen list she has from her Outpatient allergist   · IV solu medrol 60 mg q12h 8 am and 8 pm  · IM benadryl 50 mg every 4 h  · IV Pepcid 20 mg q 12 hours 8 am and 8 pm  · PRN ativan 1 mg IV every 6 hours prn  · Xopenex neb every 4 hours prn  · NS IV fluids 125 mL/hr  · Continue to monitor sxs    Crohn's disease of colon with complication ([facility] )  Assessment & Plan  · On Stelara injections every 3 weeks  · GI consulted; check her fecal calprotectin and stool cultures (yet to be collected) as well as C-reactive protein and monitor her GI symptoms  [address] Lyndsay Wu for steroid taper with budesonide 9 mg qd on dc  Continue op fu to discuss biologic therapy  Hypothyroidism (acquired)  Assessment & Plan  · Resume home levothyroxine on dc      VTE Pharmacologic Prophylaxis: VTE Score: 2 Low Risk (Score 0-2) - Encourage Ambulation  Patient Centered Rounds: I performed bedside rounds with nursing staff today  Discussions with Specialists or Other Care Team Provider: none    Education and Discussions with Family / Patient: Patient declined call to        Total Time Spent on Date of Encounter in care of patient: 35 minutes This time was spent on one or more of the following: performing physical exam; counseling and coordination of care; obtaining or reviewing history; documenting in the medical record; reviewing/ordering tests, medications or procedures; communicating with other healthcare professionals and discussing with patient's family/caregivers  Current Length of Stay: 1 day(s)  Current Patient Status: Inpatient   Certification Statement: The patient will continue to require additional inpatient hospital stay due to iv medications, montioring skin reaction, gi sxs  Discharge Plan: Anticipate discharge in 24-48 hrs to home  Code Status: Level 1 - Full Code    Subjective:   Continued erythematous and pruritic rash all over the body slightly decreased with the solumedrol/benadryl/pepcid cocktail at 8 am this morning  The 3 of them combined at the same time really helped her with the itching  No sob    She feels fuzzy coating on the tongue no odynophagia  Limited po intake because of dietary restrictions with corn exposure  No abd pain/n/v  Her colitis type loose stools have improved today, non-bloody  Objective:     Vitals:   Temp (24hrs), Av 9 °F (36 6 °C), Min:97 4 °F (36 3 °C), Max:98 3 °F (36 8 °C)    Temp:  [97 4 °F (36 3 °C)-98 3 °F (36 8 °C)] 97 4 °F (36 3 °C)  HR:  [74-93] 74  Resp:  [18-19] 18  BP: (125-141)/(80-99) 140/99  SpO2:  [95 %-100 %] 100 %  Body mass index is 25 64 kg/m²  Input and Output Summary (last 24 hours): Intake/Output Summary (Last 24 hours) at 3/4/2023 1209  Last data filed at 3/4/2023 1036  Gross per 24 hour   Intake 3006 25 ml   Output --   Net 3006 25 ml       Physical Exam:   Physical Exam  Vitals and nursing note reviewed  Constitutional:       General: She is not in acute distress  HENT:      Head: Normocephalic and atraumatic  Nose: Nose normal       Mouth/Throat:      Mouth: Mucous membranes are dry  Eyes:      Conjunctiva/sclera: Conjunctivae normal    Pulmonary:      Effort: Pulmonary effort is normal    Abdominal:      General: There is no distension  Musculoskeletal:         General: No swelling  Normal range of motion  Cervical back: Neck supple  Skin:     Findings: Erythema present  Neurological:      General: No focal deficit present  Mental Status: She is alert  Gait: Gait normal    Psychiatric:         Behavior: Behavior normal           Additional Data:     Labs:  Results from last 7 days   Lab Units 03/04/23  0617 03/03/23  1054   WBC Thousand/uL 11 76* 12 10*   HEMOGLOBIN g/dL 13 0 13 5   HEMATOCRIT % 39 6 40 6   PLATELETS Thousands/uL 384 498*   NEUTROS PCT %  --  83*   LYMPHS PCT %  --  10*   MONOS PCT %  --  6   EOS PCT %  --  0     Results from last 7 days   Lab Units 03/04/23  0617 03/03/23  1054   SODIUM mmol/L 139 140   POTASSIUM mmol/L 3 6 3 5   CHLORIDE mmol/L 106 108   CO2 mmol/L 23 20*   BUN mg/dL 9 7   CREATININE mg/dL 0 61 0 61   ANION GAP mmol/L 10 12   CALCIUM mg/dL 8 4 8 8   ALBUMIN g/dL  --  4 0   TOTAL BILIRUBIN mg/dL  --  0 42   ALK PHOS U/L  --  43   ALT U/L  --  15   AST U/L  --  13   GLUCOSE RANDOM mg/dL 91 190*     Results from last 7 days   Lab Units 03/03/23  1054   INR  0 94     Results from last 7 days   Lab Units 03/04/23  1110 03/04/23  0755 03/03/23  2203 03/03/23  1645 03/03/23  1121 03/03/23  0724   POC GLUCOSE mg/dl 88 65 207* 127 185* 155*               Lines/Drains:  Invasive Devices     Peripheral Intravenous Line  Duration           Peripheral IV 03/02/23 Dorsal (posterior); Right Forearm 1 day                      Imaging: No pertinent imaging reviewed      Recent Cultures (last 7 days):         Last 24 Hours Medication List:   Current Facility-Administered Medications   Medication Dose Route Frequency Provider Last Rate   • diphenhydrAMINE  50 mg Intramuscular Q4H Claribel Mei PA-C     • famotidine  20 mg Intravenous Q12H [address] Claribel Mei PA-C     • HYDROmorphone  0 5 mg Intravenous Q6H PRN Luz Maria Raza PA-C      Or   • HYDROmorphone  1 mg Intravenous Q6H PRN Luz Maria Raza PA-C     • levalbuterol  1 25 mg Nebulization Q4H PRN Luz Maria Raza PA-C     • LORazepam  1 mg Intravenous Q6H PRN Luz Maria Raza PA-C     • methylPREDNISolone sodium succinate  60 mg Intravenous Q12H [address] Ova RANDEE Hancock • sodium chloride  125 mL/hr Intravenous Continuous Rahul García  mL/hr (03/04/23 1036)        Today, Patient Was Seen By: Harjinder Berg PA-C    **Please Note: This note may have been constructed using a voice recognition system  **

## 2023-03-05 LAB
ALBUMIN SERPL BCP-MCNC: 3.8 G/DL (ref 3.5–5)
ALP SERPL-CCNC: 38 U/L (ref 34–104)
ALT SERPL W P-5'-P-CCNC: 12 U/L (ref 7–52)
ANION GAP SERPL CALCULATED.3IONS-SCNC: 9 MMOL/L (ref 4–13)
AST SERPL W P-5'-P-CCNC: 11 U/L (ref 13–39)
BACTERIA UR QL AUTO: ABNORMAL /HPF
BASOPHILS # BLD AUTO: 0.01 THOUSANDS/ÂΜL (ref 0–0.1)
BASOPHILS NFR BLD AUTO: 0 % (ref 0–1)
BILIRUB SERPL-MCNC: 0.3 MG/DL (ref 0.2–1)
BILIRUB UR QL STRIP: NEGATIVE
BUN SERPL-MCNC: 11 MG/DL (ref 5–25)
CALCIUM SERPL-MCNC: 7.9 MG/DL (ref 8.4–10.2)
CHLORIDE SERPL-SCNC: 105 MMOL/L (ref 96–108)
CLARITY UR: CLEAR
CO2 SERPL-SCNC: 22 MMOL/L (ref 21–32)
COLOR UR: YELLOW
CREAT SERPL-MCNC: 0.58 MG/DL (ref 0.6–1.3)
EOSINOPHIL # BLD AUTO: 0 THOUSAND/ÂΜL (ref 0–0.61)
EOSINOPHIL NFR BLD AUTO: 0 % (ref 0–6)
ERYTHROCYTE [DISTWIDTH] IN BLOOD BY AUTOMATED COUNT: 15.2 % (ref 11.6–15.1)
GFR SERPL CREATININE-BSD FRML MDRD: 107 ML/MIN/1.73SQ M
GLUCOSE SERPL-MCNC: 100 MG/DL (ref 65–140)
GLUCOSE SERPL-MCNC: 105 MG/DL (ref 65–140)
GLUCOSE SERPL-MCNC: 137 MG/DL (ref 65–140)
GLUCOSE SERPL-MCNC: 149 MG/DL (ref 65–140)
GLUCOSE SERPL-MCNC: 175 MG/DL (ref 65–140)
GLUCOSE UR STRIP-MCNC: ABNORMAL MG/DL
HCT VFR BLD AUTO: 39.9 % (ref 34.8–46.1)
HGB BLD-MCNC: 13 G/DL (ref 11.5–15.4)
HGB UR QL STRIP.AUTO: ABNORMAL
IMM GRANULOCYTES # BLD AUTO: 0.04 THOUSAND/UL (ref 0–0.2)
IMM GRANULOCYTES NFR BLD AUTO: 0 % (ref 0–2)
KETONES UR STRIP-MCNC: NEGATIVE MG/DL
LEUKOCYTE ESTERASE UR QL STRIP: NEGATIVE
LYMPHOCYTES # BLD AUTO: 0.94 THOUSANDS/ÂΜL (ref 0.6–4.47)
LYMPHOCYTES NFR BLD AUTO: 11 % (ref 14–44)
MCH RBC QN AUTO: 30.3 PG (ref 26.8–34.3)
MCHC RBC AUTO-ENTMCNC: 32.6 G/DL (ref 31.4–37.4)
MCV RBC AUTO: 93 FL (ref 82–98)
MONOCYTES # BLD AUTO: 0.34 THOUSAND/ÂΜL (ref 0.17–1.22)
MONOCYTES NFR BLD AUTO: 4 % (ref 4–12)
NEUTROPHILS # BLD AUTO: 7.61 THOUSANDS/ÂΜL (ref 1.85–7.62)
NEUTS SEG NFR BLD AUTO: 85 % (ref 43–75)
NITRITE UR QL STRIP: NEGATIVE
NON-SQ EPI CELLS URNS QL MICRO: ABNORMAL /HPF
NRBC BLD AUTO-RTO: 0 /100 WBCS
PH UR STRIP.AUTO: 6 [PH]
PLATELET # BLD AUTO: 477 THOUSANDS/UL (ref 149–390)
PMV BLD AUTO: 9.7 FL (ref 8.9–12.7)
POTASSIUM SERPL-SCNC: 3.9 MMOL/L (ref 3.5–5.3)
PROT SERPL-MCNC: 6.6 G/DL (ref 6.4–8.4)
PROT UR STRIP-MCNC: NEGATIVE MG/DL
RBC # BLD AUTO: 4.29 MILLION/UL (ref 3.81–5.12)
RBC #/AREA URNS AUTO: ABNORMAL /HPF
SODIUM SERPL-SCNC: 136 MMOL/L (ref 135–147)
SP GR UR STRIP.AUTO: 1.02 (ref 1–1.03)
TRYPTASE SERPL-MCNC: 7.5 UG/L (ref 2.2–13.2)
UROBILINOGEN UR QL STRIP.AUTO: 0.2 E.U./DL
WBC # BLD AUTO: 8.94 THOUSAND/UL (ref 4.31–10.16)
WBC #/AREA URNS AUTO: ABNORMAL /HPF

## 2023-03-05 RX ORDER — ONDANSETRON 2 MG/ML
4 INJECTION INTRAMUSCULAR; INTRAVENOUS EVERY 4 HOURS PRN
Status: DISCONTINUED | OUTPATIENT
Start: 2023-03-05 | End: 2023-03-10 | Stop reason: HOSPADM

## 2023-03-05 RX ORDER — ONDANSETRON 2 MG/ML
4 INJECTION INTRAMUSCULAR; INTRAVENOUS ONCE
Status: COMPLETED | OUTPATIENT
Start: 2023-03-05 | End: 2023-03-05

## 2023-03-05 RX ORDER — BISACODYL 5 MG/1
5 TABLET, DELAYED RELEASE ORAL ONCE
Status: COMPLETED | OUTPATIENT
Start: 2023-03-05 | End: 2023-03-05

## 2023-03-05 RX ADMIN — DIPHENHYDRAMINE HYDROCHLORIDE 50 MG: 50 INJECTION, SOLUTION INTRAMUSCULAR; INTRAVENOUS at 16:49

## 2023-03-05 RX ADMIN — FAMOTIDINE 20 MG: 10 INJECTION, SOLUTION INTRAVENOUS at 20:56

## 2023-03-05 RX ADMIN — LORAZEPAM 1 MG: 2 INJECTION INTRAMUSCULAR; INTRAVENOUS at 15:16

## 2023-03-05 RX ADMIN — METHYLPREDNISOLONE SODIUM SUCCINATE 60 MG: 125 INJECTION, POWDER, FOR SOLUTION INTRAMUSCULAR; INTRAVENOUS at 20:56

## 2023-03-05 RX ADMIN — ONDANSETRON 4 MG: 2 INJECTION INTRAMUSCULAR; INTRAVENOUS at 15:22

## 2023-03-05 RX ADMIN — SODIUM CHLORIDE 125 ML/HR: 0.9 INJECTION, SOLUTION INTRAVENOUS at 02:24

## 2023-03-05 RX ADMIN — METHYLPREDNISOLONE SODIUM SUCCINATE 60 MG: 125 INJECTION, POWDER, FOR SOLUTION INTRAMUSCULAR; INTRAVENOUS at 09:00

## 2023-03-05 RX ADMIN — ONDANSETRON 4 MG: 2 INJECTION INTRAMUSCULAR; INTRAVENOUS at 03:14

## 2023-03-05 RX ADMIN — NYSTATIN 500000 UNITS: 100000 SUSPENSION ORAL at 17:02

## 2023-03-05 RX ADMIN — HYDROMORPHONE HYDROCHLORIDE 1 MG: 1 INJECTION, SOLUTION INTRAMUSCULAR; INTRAVENOUS; SUBCUTANEOUS at 15:15

## 2023-03-05 RX ADMIN — DIPHENHYDRAMINE HYDROCHLORIDE 50 MG: 50 INJECTION, SOLUTION INTRAMUSCULAR; INTRAVENOUS at 04:56

## 2023-03-05 RX ADMIN — DIPHENHYDRAMINE HYDROCHLORIDE 50 MG: 50 INJECTION, SOLUTION INTRAMUSCULAR; INTRAVENOUS at 12:35

## 2023-03-05 RX ADMIN — LORAZEPAM 1 MG: 2 INJECTION INTRAMUSCULAR; INTRAVENOUS at 21:28

## 2023-03-05 RX ADMIN — NYSTATIN 500000 UNITS: 100000 SUSPENSION ORAL at 12:35

## 2023-03-05 RX ADMIN — DIPHENHYDRAMINE HYDROCHLORIDE 50 MG: 50 INJECTION, SOLUTION INTRAMUSCULAR; INTRAVENOUS at 20:56

## 2023-03-05 RX ADMIN — ONDANSETRON 4 MG: 2 INJECTION INTRAMUSCULAR; INTRAVENOUS at 20:56

## 2023-03-05 RX ADMIN — HYDROMORPHONE HYDROCHLORIDE 1 MG: 1 INJECTION, SOLUTION INTRAMUSCULAR; INTRAVENOUS; SUBCUTANEOUS at 02:23

## 2023-03-05 RX ADMIN — FAMOTIDINE 20 MG: 10 INJECTION, SOLUTION INTRAVENOUS at 09:00

## 2023-03-05 RX ADMIN — DIPHENHYDRAMINE HYDROCHLORIDE 50 MG: 50 INJECTION, SOLUTION INTRAMUSCULAR; INTRAVENOUS at 00:36

## 2023-03-05 RX ADMIN — NYSTATIN 500000 UNITS: 100000 SUSPENSION ORAL at 21:29

## 2023-03-05 RX ADMIN — HYDROMORPHONE HYDROCHLORIDE 1 MG: 1 INJECTION, SOLUTION INTRAMUSCULAR; INTRAVENOUS; SUBCUTANEOUS at 21:28

## 2023-03-05 RX ADMIN — HYDROMORPHONE HYDROCHLORIDE 1 MG: 1 INJECTION, SOLUTION INTRAMUSCULAR; INTRAVENOUS; SUBCUTANEOUS at 09:00

## 2023-03-05 RX ADMIN — SODIUM CHLORIDE 125 ML/HR: 0.9 INJECTION, SOLUTION INTRAVENOUS at 16:49

## 2023-03-05 RX ADMIN — NYSTATIN 500000 UNITS: 100000 SUSPENSION ORAL at 09:04

## 2023-03-05 RX ADMIN — DIPHENHYDRAMINE HYDROCHLORIDE 50 MG: 50 INJECTION, SOLUTION INTRAMUSCULAR; INTRAVENOUS at 08:59

## 2023-03-05 RX ADMIN — BISACODYL 5 MG: 5 TABLET, COATED ORAL at 12:35

## 2023-03-05 RX ADMIN — LORAZEPAM 1 MG: 2 INJECTION INTRAMUSCULAR; INTRAVENOUS at 02:24

## 2023-03-05 RX ADMIN — LORAZEPAM 1 MG: 2 INJECTION INTRAMUSCULAR; INTRAVENOUS at 09:00

## 2023-03-05 RX ADMIN — SODIUM CHLORIDE 125 ML/HR: 0.9 INJECTION, SOLUTION INTRAVENOUS at 09:15

## 2023-03-05 NOTE — PROGRESS NOTES
[address]  Progress Note - Dee Dee Gudino 1975, 46 y o  female MRN: [id number]  Unit/Bed#: 408-01 Encounter: [id number]  Primary Care Provider: Tex Aguilar DO   Date and time admitted to hospital: 3/2/2023 10:00 PM    * 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[facility] )  Assessment & Plan  · Presentation for [facility] with hives and burning sensation in throat  · Continue IV methylprednisolone 60 mg twice daily, diphenhydramine IV fluids and famotidine as recommended by her outpatient allergist     Crohn's disease of colon with complication ([facility] )  Assessment & Plan  · Stelara every 3 weeks  · Seen by GI this admission who ordered fecal calprotectin  · Restart budesonide upon discharge    Hypothyroidism (acquired)  Assessment & Plan  · Restart patients levothyroxine at time of discharge      VTE Pharmacologic Prophylaxis: VTE Score: 2 Low Risk (Score 0-2) - Encourage Ambulation  Patient Centered Rounds: I have performed bedside rounds with nursing staff today  Discussions with Specialists or Other Care Team Provider:     Education and Discussions with Family / Patient: Spouse on telephone    Time Spent for Care: This time was spent on one or more of the following: performing physical exam; counseling and coordination of care; obtaining or reviewing history; documenting in the medical record; reviewing/ordering tests, medications or procedures; communicating with other healthcare professionals and discussing with patient's family/caregivers  Current Length of Stay: 2 day(s)  Current Patient Status: Inpatient   Certification Statement: The patient will continue to require additional inpatient hospital stay due to IV steroids  Discharge Plan: Anticipate discharge in 24-48 hrs to home  Code Status: Level 1 - Full Code      Subjective:   Patient seen and examined  Feeling a little bit better  Still having some pruritus and nausea      Objective:   Vitals: Blood pressure 119/84, pulse 60, temperature 97 6 °F (36 4 °C), temperature source Temporal, resp  rate 16, height 5' 5" (1 651 m), weight 69 9 kg (154 lb 1 6 oz), SpO2 100 %, not currently breastfeeding  Intake/Output Summary (Last 24 hours) at 3/5/2023 1444  Last data filed at 3/5/2023 0915  Gross per 24 hour   Intake 3342 91 ml   Output --   Net 3342 91 ml       Physical Exam  Vitals reviewed  Constitutional:       General: She is not in acute distress  Appearance: Normal appearance  HENT:      Head: Atraumatic  Cardiovascular:      Rate and Rhythm: Regular rhythm  Heart sounds: Normal heart sounds  Pulmonary:      Breath sounds: Normal breath sounds  No wheezing  Abdominal:      General: Bowel sounds are normal       Palpations: Abdomen is soft  Tenderness: There is no guarding or rebound  Musculoskeletal:         General: No swelling  Skin:     General: Skin is warm  Neurological:      Mental Status: She is alert  Cranial Nerves: No cranial nerve deficit  Motor: No weakness     Psychiatric:         Mood and Affect: Mood normal        Additional Data:   Labs:  Results from last 7 days   Lab Units 03/05/23  0524 03/04/23  0617 03/03/23  1054   WBC Thousand/uL 8 94 11 76* 12 10*   HEMOGLOBIN g/dL 13 0 13 0 13 5   PLATELETS Thousands/uL 477* 384 498*   MCV fL 93 93 92   INR   --   --  0 94     Results from last 7 days   Lab Units 03/05/23  0524 03/04/23  0617 03/03/23  1054 03/02/23  2328   SODIUM mmol/L 136 139 140 136   POTASSIUM mmol/L 3 9 3 6 3 5 4 1   CHLORIDE mmol/L 105 106 108 106   CO2 mmol/L 22 23 20* 21   ANION GAP mmol/L 9 10 12 9   BUN mg/dL 11 9 7 18   CREATININE mg/dL 0 58* 0 61 0 61 0 77   CALCIUM mg/dL 7 9* 8 4 8 8 9 1   ALBUMIN g/dL 3 8  --  4 0 4 1   TOTAL BILIRUBIN mg/dL 0 30  --  0 42 0 26   ALK PHOS U/L 38  --  43 51   ALT U/L 12  --  15 17   AST U/L 11*  --  13 17   EGFR ml/min/1 73sq m 107 106 106 90   GLUCOSE RANDOM mg/dL 137 91 190* 211*     Results from last 7 days   Lab Units 03/03/23  1054   MAGNESIUM mg/dL 1 9                      Results from last 7 days   Lab Units 03/05/23  1121 03/05/23  0712 03/04/23  1634 03/04/23  1110 03/04/23  0755 03/03/23  2203 03/03/23  1645 03/03/23  1121 03/03/23  0724   POC GLUCOSE mg/dl 100 105 125 88 65 207* 127 185* 155*             * I Have Reviewed All Lab Data Listed Above  Cultures:       Results from last 7 days   Lab Units 03/02/23  2328   SARS-COV-2  Negative   INFLUENZA A PCR  Negative   INFLUENZA B PCR  Negative   RSV PCR  Negative           Lines/Drains:  Invasive Devices     Peripheral Intravenous Line  Duration           Peripheral IV 03/02/23 Dorsal (posterior); Right Forearm 2 days              Telemetry:      Imaging:  Imaging Reports Reviewed Today Include:   No results found  Scheduled Meds:  Current Facility-Administered Medications   Medication Dose Route Frequency Provider Last Rate   • diphenhydrAMINE  50 mg Intramuscular Q4H Claribel Mei PA-C     • famotidine  20 mg Intravenous Q12H [address] Claribel Mei PA-C     • HYDROmorphone  0 5 mg Intravenous Q6H PRN Leopold Gage, PA-C      Or   • HYDROmorphone  1 mg Intravenous Q6H PRN Leopold Gage, PA-C     • levalbuterol  1 25 mg Nebulization Q4H PRN Leopold Gage, PA-C     • LORazepam  1 mg Intravenous Q6H PRN Leopold Gage, PA-C     • methylPREDNISolone sodium succinate  60 mg Intravenous Q12H [address] Claribel Mei PA-C     • nystatin  500,000 Units Swish & Spit 4x Daily Juliana Yeager DO     • ondansetron  4 mg Intravenous Q4H PRN Juliana Yeager DO     • sodium chloride  125 mL/hr Intravenous Continuous Yanira Santos  mL/hr (03/05/23 0915)       Today, Patient Was Seen By: Juliana Yeager DO    ** Please Note: Dictation voice to text software may have been used in the creation of this document   **

## 2023-03-05 NOTE — ASSESSMENT & PLAN NOTE
· Presentation for [facility] with hives and burning sensation in throat  · Continue IV methylprednisolone 60 mg twice daily, diphenhydramine IV fluids and famotidine as recommended by her outpatient allergist

## 2023-03-05 NOTE — ASSESSMENT & PLAN NOTE
· Stelara every 3 weeks    · Seen by GI this admission who ordered fecal calprotectin  · Restart budesonide upon discharge

## 2023-03-06 DIAGNOSIS — T78.2XXD ANAPHYLAXIS, SUBSEQUENT ENCOUNTER: ICD-10-CM

## 2023-03-06 DIAGNOSIS — L50.1 CHRONIC IDIOPATHIC URTICARIA: Primary | ICD-10-CM

## 2023-03-06 LAB
ALBUMIN SERPL BCP-MCNC: 3.5 G/DL (ref 3.5–5)
ALP SERPL-CCNC: 40 U/L (ref 34–104)
ALT SERPL W P-5'-P-CCNC: 13 U/L (ref 7–52)
ANION GAP SERPL CALCULATED.3IONS-SCNC: 8 MMOL/L (ref 4–13)
AST SERPL W P-5'-P-CCNC: 9 U/L (ref 13–39)
BILIRUB SERPL-MCNC: 0.25 MG/DL (ref 0.2–1)
BUN SERPL-MCNC: 8 MG/DL (ref 5–25)
CALCIUM SERPL-MCNC: 8.3 MG/DL (ref 8.4–10.2)
CHLORIDE SERPL-SCNC: 105 MMOL/L (ref 96–108)
CO2 SERPL-SCNC: 25 MMOL/L (ref 21–32)
CREAT SERPL-MCNC: 0.58 MG/DL (ref 0.6–1.3)
ERYTHROCYTE [DISTWIDTH] IN BLOOD BY AUTOMATED COUNT: 14.9 % (ref 11.6–15.1)
GFR SERPL CREATININE-BSD FRML MDRD: 107 ML/MIN/1.73SQ M
GLUCOSE SERPL-MCNC: 100 MG/DL (ref 65–140)
GLUCOSE SERPL-MCNC: 116 MG/DL (ref 65–140)
GLUCOSE SERPL-MCNC: 133 MG/DL (ref 65–140)
GLUCOSE SERPL-MCNC: 98 MG/DL (ref 65–140)
HCT VFR BLD AUTO: 38 % (ref 34.8–46.1)
HGB BLD-MCNC: 12.6 G/DL (ref 11.5–15.4)
MCH RBC QN AUTO: 30.8 PG (ref 26.8–34.3)
MCHC RBC AUTO-ENTMCNC: 33.2 G/DL (ref 31.4–37.4)
MCV RBC AUTO: 93 FL (ref 82–98)
PLATELET # BLD AUTO: 433 THOUSANDS/UL (ref 149–390)
PMV BLD AUTO: 9.4 FL (ref 8.9–12.7)
POTASSIUM SERPL-SCNC: 3.9 MMOL/L (ref 3.5–5.3)
PROT SERPL-MCNC: 5.8 G/DL (ref 6.4–8.4)
RBC # BLD AUTO: 4.09 MILLION/UL (ref 3.81–5.12)
SODIUM SERPL-SCNC: 138 MMOL/L (ref 135–147)
WBC # BLD AUTO: 9.52 THOUSAND/UL (ref 4.31–10.16)

## 2023-03-06 RX ORDER — EPINEPHRINE 0.3 MG/.3ML
0.3 INJECTION SUBCUTANEOUS ONCE
Qty: 0.12 ML | Refills: 3 | Status: SHIPPED | OUTPATIENT
Start: 2023-03-06 | End: 2023-03-06

## 2023-03-06 RX ORDER — BISACODYL 5 MG/1
5 TABLET, DELAYED RELEASE ORAL DAILY PRN
Status: DISCONTINUED | OUTPATIENT
Start: 2023-03-06 | End: 2023-03-07

## 2023-03-06 RX ADMIN — DIPHENHYDRAMINE HYDROCHLORIDE 50 MG: 50 INJECTION, SOLUTION INTRAMUSCULAR; INTRAVENOUS at 01:00

## 2023-03-06 RX ADMIN — LORAZEPAM 1 MG: 2 INJECTION INTRAMUSCULAR; INTRAVENOUS at 08:42

## 2023-03-06 RX ADMIN — DIPHENHYDRAMINE HYDROCHLORIDE 50 MG: 50 INJECTION, SOLUTION INTRAMUSCULAR; INTRAVENOUS at 23:55

## 2023-03-06 RX ADMIN — HYDROMORPHONE HYDROCHLORIDE 1 MG: 1 INJECTION, SOLUTION INTRAMUSCULAR; INTRAVENOUS; SUBCUTANEOUS at 08:42

## 2023-03-06 RX ADMIN — DIPHENHYDRAMINE HYDROCHLORIDE 50 MG: 50 INJECTION, SOLUTION INTRAMUSCULAR; INTRAVENOUS at 15:07

## 2023-03-06 RX ADMIN — SODIUM CHLORIDE 125 ML/HR: 0.9 INJECTION, SOLUTION INTRAVENOUS at 17:22

## 2023-03-06 RX ADMIN — NYSTATIN 500000 UNITS: 100000 SUSPENSION ORAL at 22:15

## 2023-03-06 RX ADMIN — DIPHENHYDRAMINE HYDROCHLORIDE 50 MG: 50 INJECTION, SOLUTION INTRAMUSCULAR; INTRAVENOUS at 20:17

## 2023-03-06 RX ADMIN — NYSTATIN 500000 UNITS: 100000 SUSPENSION ORAL at 17:22

## 2023-03-06 RX ADMIN — METHYLPREDNISOLONE SODIUM SUCCINATE 60 MG: 125 INJECTION, POWDER, FOR SOLUTION INTRAMUSCULAR; INTRAVENOUS at 20:04

## 2023-03-06 RX ADMIN — METHYLPREDNISOLONE SODIUM SUCCINATE 60 MG: 125 INJECTION, POWDER, FOR SOLUTION INTRAMUSCULAR; INTRAVENOUS at 08:27

## 2023-03-06 RX ADMIN — DIPHENHYDRAMINE HYDROCHLORIDE 50 MG: 50 INJECTION, SOLUTION INTRAMUSCULAR; INTRAVENOUS at 08:27

## 2023-03-06 RX ADMIN — FAMOTIDINE 20 MG: 10 INJECTION, SOLUTION INTRAVENOUS at 20:04

## 2023-03-06 RX ADMIN — DIPHENHYDRAMINE HYDROCHLORIDE 50 MG: 50 INJECTION, SOLUTION INTRAMUSCULAR; INTRAVENOUS at 11:49

## 2023-03-06 RX ADMIN — FAMOTIDINE 20 MG: 10 INJECTION, SOLUTION INTRAVENOUS at 08:27

## 2023-03-06 RX ADMIN — NYSTATIN 500000 UNITS: 100000 SUSPENSION ORAL at 11:50

## 2023-03-06 RX ADMIN — SODIUM CHLORIDE 125 ML/HR: 0.9 INJECTION, SOLUTION INTRAVENOUS at 08:44

## 2023-03-06 RX ADMIN — ONDANSETRON 4 MG: 2 INJECTION INTRAMUSCULAR; INTRAVENOUS at 22:10

## 2023-03-06 RX ADMIN — NYSTATIN 500000 UNITS: 100000 SUSPENSION ORAL at 08:42

## 2023-03-06 RX ADMIN — ONDANSETRON 4 MG: 2 INJECTION INTRAMUSCULAR; INTRAVENOUS at 13:22

## 2023-03-06 RX ADMIN — HYDROMORPHONE HYDROCHLORIDE 1 MG: 1 INJECTION, SOLUTION INTRAMUSCULAR; INTRAVENOUS; SUBCUTANEOUS at 22:09

## 2023-03-06 RX ADMIN — LORAZEPAM 1 MG: 2 INJECTION INTRAMUSCULAR; INTRAVENOUS at 22:15

## 2023-03-06 RX ADMIN — DIPHENHYDRAMINE HYDROCHLORIDE 50 MG: 50 INJECTION, SOLUTION INTRAMUSCULAR; INTRAVENOUS at 05:06

## 2023-03-06 RX ADMIN — SODIUM CHLORIDE 125 ML/HR: 0.9 INJECTION, SOLUTION INTRAVENOUS at 01:00

## 2023-03-06 RX ADMIN — LORAZEPAM 1 MG: 2 INJECTION INTRAMUSCULAR; INTRAVENOUS at 15:07

## 2023-03-06 RX ADMIN — HYDROMORPHONE HYDROCHLORIDE 1 MG: 1 INJECTION, SOLUTION INTRAMUSCULAR; INTRAVENOUS; SUBCUTANEOUS at 15:06

## 2023-03-06 RX ADMIN — BISACODYL 5 MG: 5 TABLET, COATED ORAL at 13:26

## 2023-03-06 NOTE — ASSESSMENT & PLAN NOTE
· Stelara every 3 weeks    · Seen by GI this admission who ordered fecal calprotectin, not collected  · CRP normal  · Restart budesonide upon discharge

## 2023-03-06 NOTE — PLAN OF CARE
Problem: DISCHARGE PLANNING - CARE MANAGEMENT  Goal: Discharge to post-acute care or home with appropriate resources  Description: INTERVENTIONS:  - Conduct assessment to determine patient/family and health care team treatment goals, and need for post-acute services based on payer coverage, community resources, and patient preferences, and barriers to discharge  - Address psychosocial, clinical, and financial barriers to discharge as identified in assessment in conjunction with the patient/family and health care team  - Arrange appropriate level of post-acute services according to patient’s   needs and preference and payer coverage in collaboration with the physician and health care team  - Communicate with and update the patient/family, physician, and health care team regarding progress on the discharge plan  - Arrange appropriate transportation to post-acute venues  Outcome: Progressing     Problem: GASTROINTESTINAL - ADULT  Goal: Minimal or absence of nausea and/or vomiting  Description: INTERVENTIONS:  - Administer IV fluids if ordered to ensure adequate hydration  - Maintain NPO status until nausea and vomiting are resolved  - Nasogastric tube if ordered  - Administer ordered antiemetic medications as needed  - Provide nonpharmacologic comfort measures as appropriate  - Advance diet as tolerated, if ordered  - Consider nutrition services referral to assist patient with adequate nutrition and appropriate food choices  Outcome: Progressing     Problem: PAIN - ADULT  Goal: Verbalizes/displays adequate comfort level or baseline comfort level  Description: Interventions:  - Encourage patient to monitor pain and request assistance  - Assess pain using appropriate pain scale  - Administer analgesics based on type and severity of pain and evaluate response  - Implement non-pharmacological measures as appropriate and evaluate response  - Consider cultural and social influences on pain and pain management  - Notify physician/advanced practitioner if interventions unsuccessful or patient reports new pain  Outcome: Progressing

## 2023-03-06 NOTE — PROGRESS NOTES
[address] [facility]  Progress Note - Raphael Castellanos 1975, 46 y o  female MRN: [id number]  Unit/Bed#: 133-07 Encounter: [id number]  Primary Care Provider: Alba Tobias DO   Date and time admitted to hospital: 3/2/2023 10:00 PM    * 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[facility] )  Assessment & Plan  · Presentation for [facility] with hives and burning sensation in throat  · Continue IV methylprednisolone 60 mg twice daily, diphenhydramine IV fluids and famotidine as recommended by her outpatient allergist  · She still does not feel back at her baseline  · Continue Solu-Medrol today, plan for a budesonide taper on discharge hopefully in the next 24 to 48 hours as she did in the past    Crohn's disease of colon with complication ([facility] 75 )  Assessment & Plan  · Stelara every 3 weeks  · Seen by GI this admission who ordered fecal calprotectin, not collected  · CRP normal  · Restart budesonide upon discharge    Hypothyroidism (acquired)  Assessment & Plan  · Restart patients levothyroxine at time of discharge          VTE Pharmacologic Prophylaxis: VTE Score: 2 Low Risk (Score 0-2) - Encourage Ambulation  Patient Centered Rounds: Discussed with nursing  Discussions with Specialists or Other Care Team Provider: Nursing    Education and Discussions with Family / Patient: Patient declined call to   Total Time Spent on Date of Encounter in care of patient: 35 minutes This time was spent on one or more of the following: performing physical exam; counseling and coordination of care; obtaining or reviewing history; documenting in the medical record; reviewing/ordering tests, medications or procedures; communicating with other healthcare professionals and discussing with patient's family/caregivers      Current Length of Stay: 3 day(s)  Current Patient Status: Inpatient   Certification Statement: The patient will continue to require additional inpatient hospital stay due to Mast cell activation syndrome  Discharge Plan: Anticipate discharge in 24-48 hrs to home  Code Status: Level 1 - Full Code    Subjective:   Patient was seen and evaluated bedside  She says she still not back at her baseline  She did not have a bowel movement  Objective:     Vitals:   Temp (24hrs), Av 6 °F (37 °C), Min:98 2 °F (36 8 °C), Max:99 2 °F (37 3 °C)    Temp:  [98 2 °F (36 8 °C)-99 2 °F (37 3 °C)] 98 2 °F (36 8 °C)  HR:  [62-84] 62  Resp:  [16] 16  BP: (120-134)/(76-85) 122/76  SpO2:  [97 %] 97 %  Body mass index is 25 64 kg/m²  Input and Output Summary (last 24 hours): Intake/Output Summary (Last 24 hours) at 3/6/2023 1215  Last data filed at 3/5/2023 2346  Gross per 24 hour   Intake 1405 83 ml   Output --   Net 1405 83 ml       Physical Exam:   Physical Exam  Constitutional:       General: She is not in acute distress  HENT:      Head: Atraumatic  Cardiovascular:      Rate and Rhythm: Normal rate and regular rhythm  Heart sounds: No murmur heard  No friction rub  No gallop  Pulmonary:      Effort: Pulmonary effort is normal  No respiratory distress  Breath sounds: Normal breath sounds  No wheezing  Abdominal:      General: Bowel sounds are normal  There is no distension  Palpations: Abdomen is soft  Tenderness: There is no abdominal tenderness  Musculoskeletal:         General: No swelling  Cervical back: Neck supple  Skin:     General: Skin is warm and dry  Neurological:      General: No focal deficit present  Mental Status: She is alert     Psychiatric:         Mood and Affect: Mood normal           Additional Data:     Labs:  Results from last 7 days   Lab Units 23  0508 23  0524   WBC Thousand/uL 9 52 8 94   HEMOGLOBIN g/dL 12 6 13 0   HEMATOCRIT % 38 0 39 9   PLATELETS Thousands/uL 433* 477*   NEUTROS PCT %  --  85*   LYMPHS PCT %  --  11*   MONOS PCT %  --  4   EOS PCT %  --  0     Results from last 7 days   Lab Units 23  0508   SODIUM mmol/L 138   POTASSIUM mmol/L 3 9   CHLORIDE mmol/L 105   CO2 mmol/L 25   BUN mg/dL 8   CREATININE mg/dL 0 58*   ANION GAP mmol/L 8   CALCIUM mg/dL 8 3*   ALBUMIN g/dL 3 5   TOTAL BILIRUBIN mg/dL 0 25   ALK PHOS U/L 40   ALT U/L 13   AST U/L 9*   GLUCOSE RANDOM mg/dL 133     Results from last 7 days   Lab Units 03/03/23  1054   INR  0 94     Results from last 7 days   Lab Units 03/06/23  1110 03/06/23  0751 03/05/23  2341 03/05/23  1605 03/05/23  1121 03/05/23  0712 03/04/23  1634 03/04/23  1110 03/04/23  0755 03/03/23  2203 03/03/23  1645 03/03/23  1121   POC GLUCOSE mg/dl 100 98 149* 175* 100 105 125 88 65 207* 127 185*               Lines/Drains:  Invasive Devices     Peripheral Intravenous Line  Duration           Peripheral IV 03/02/23 Dorsal (posterior); Right Forearm 3 days                      Imaging: No imaging done on this admission    Recent Cultures (last 7 days):         Last 24 Hours Medication List:   Current Facility-Administered Medications   Medication Dose Route Frequency Provider Last Rate   • diphenhydrAMINE  50 mg Intramuscular Q4H Claribel Mei PA-C     • famotidine  20 mg Intravenous Q12H [facility] Claribel Mei PA-C     • HYDROmorphone  0 5 mg Intravenous Q6H PRN Kristine Lagos PA-C      Or   • HYDROmorphone  1 mg Intravenous Q6H PRN Kristine Lagos PA-C     • levalbuterol  1 25 mg Nebulization Q4H PRN Kristine Lagos PA-C     • LORazepam  1 mg Intravenous Q6H PRN Kristine Lagos PA-C     • methylPREDNISolone sodium succinate  60 mg Intravenous Q12H [facility] Claribel Mei PA-C     • nystatin  500,000 Units Swish & Spit 4x Daily Wyatt Rodriguez DO     • ondansetron  4 mg Intravenous Q4H PRN Wyatt Rodriguez DO     • sodium chloride  125 mL/hr Intravenous Continuous Tilmon Peto,  mL/hr (03/06/23 0844)        Today, Patient Was Seen By: Wyatt Tejeda MD    **Please Note: This note may have been constructed using a voice recognition system  **

## 2023-03-06 NOTE — ASSESSMENT & PLAN NOTE
· Presentation for [facility] with hives and burning sensation in throat  · Continue IV methylprednisolone 60 mg twice daily, diphenhydramine IV fluids and famotidine as recommended by her outpatient allergist  · She still does not feel back at her baseline  · Continue Solu-Medrol today, plan for a budesonide taper on discharge hopefully in the next 24 to 48 hours as she did in the past

## 2023-03-07 ENCOUNTER — PREP FOR PROCEDURE (OUTPATIENT)
Dept: GASTROENTEROLOGY | Facility: CLINIC | Age: 48
End: 2023-03-07

## 2023-03-07 ENCOUNTER — TELEPHONE (OUTPATIENT)
Dept: GASTROENTEROLOGY | Facility: CLINIC | Age: 48
End: 2023-03-07

## 2023-03-07 DIAGNOSIS — K50.119 CROHN'S DISEASE OF COLON WITH COMPLICATION (HCC): Primary | ICD-10-CM

## 2023-03-07 LAB
ANION GAP SERPL CALCULATED.3IONS-SCNC: 8 MMOL/L (ref 4–13)
BASOPHILS # BLD AUTO: 0 THOUSANDS/ÂΜL (ref 0–0.1)
BASOPHILS NFR BLD AUTO: 0 % (ref 0–1)
BUN SERPL-MCNC: 10 MG/DL (ref 5–25)
CALCIUM SERPL-MCNC: 8.3 MG/DL (ref 8.4–10.2)
CHLORIDE SERPL-SCNC: 105 MMOL/L (ref 96–108)
CO2 SERPL-SCNC: 25 MMOL/L (ref 21–32)
CREAT SERPL-MCNC: 0.68 MG/DL (ref 0.6–1.3)
EOSINOPHIL # BLD AUTO: 0 THOUSAND/ÂΜL (ref 0–0.61)
EOSINOPHIL NFR BLD AUTO: 0 % (ref 0–6)
ERYTHROCYTE [DISTWIDTH] IN BLOOD BY AUTOMATED COUNT: 14.8 % (ref 11.6–15.1)
GFR SERPL CREATININE-BSD FRML MDRD: 102 ML/MIN/1.73SQ M
GLUCOSE SERPL-MCNC: 126 MG/DL (ref 65–140)
GLUCOSE SERPL-MCNC: 161 MG/DL (ref 65–140)
GLUCOSE SERPL-MCNC: 185 MG/DL (ref 65–140)
GLUCOSE SERPL-MCNC: 94 MG/DL (ref 65–140)
GLUCOSE SERPL-MCNC: 94 MG/DL (ref 65–140)
HCT VFR BLD AUTO: 38.7 % (ref 34.8–46.1)
HGB BLD-MCNC: 12.8 G/DL (ref 11.5–15.4)
IMM GRANULOCYTES # BLD AUTO: 0.05 THOUSAND/UL (ref 0–0.2)
IMM GRANULOCYTES NFR BLD AUTO: 0 % (ref 0–2)
LYMPHOCYTES # BLD AUTO: 1.22 THOUSANDS/ÂΜL (ref 0.6–4.47)
LYMPHOCYTES NFR BLD AUTO: 10 % (ref 14–44)
MCH RBC QN AUTO: 30.4 PG (ref 26.8–34.3)
MCHC RBC AUTO-ENTMCNC: 33.1 G/DL (ref 31.4–37.4)
MCV RBC AUTO: 92 FL (ref 82–98)
MONOCYTES # BLD AUTO: 0.5 THOUSAND/ÂΜL (ref 0.17–1.22)
MONOCYTES NFR BLD AUTO: 4 % (ref 4–12)
NEUTROPHILS # BLD AUTO: 10.84 THOUSANDS/ÂΜL (ref 1.85–7.62)
NEUTS SEG NFR BLD AUTO: 86 % (ref 43–75)
NRBC BLD AUTO-RTO: 0 /100 WBCS
PLATELET # BLD AUTO: 440 THOUSANDS/UL (ref 149–390)
PMV BLD AUTO: 9.4 FL (ref 8.9–12.7)
POTASSIUM SERPL-SCNC: 4 MMOL/L (ref 3.5–5.3)
RBC # BLD AUTO: 4.21 MILLION/UL (ref 3.81–5.12)
SODIUM SERPL-SCNC: 138 MMOL/L (ref 135–147)
WBC # BLD AUTO: 12.61 THOUSAND/UL (ref 4.31–10.16)

## 2023-03-07 RX ORDER — BISACODYL 5 MG/1
10 TABLET, DELAYED RELEASE ORAL DAILY
Status: DISCONTINUED | OUTPATIENT
Start: 2023-03-07 | End: 2023-03-09

## 2023-03-07 RX ADMIN — DIPHENHYDRAMINE HYDROCHLORIDE 50 MG: 50 INJECTION, SOLUTION INTRAMUSCULAR; INTRAVENOUS at 23:50

## 2023-03-07 RX ADMIN — HYDROMORPHONE HYDROCHLORIDE 1 MG: 1 INJECTION, SOLUTION INTRAMUSCULAR; INTRAVENOUS; SUBCUTANEOUS at 23:49

## 2023-03-07 RX ADMIN — DIPHENHYDRAMINE HYDROCHLORIDE 50 MG: 50 INJECTION, SOLUTION INTRAMUSCULAR; INTRAVENOUS at 12:16

## 2023-03-07 RX ADMIN — METHYLPREDNISOLONE SODIUM SUCCINATE 60 MG: 125 INJECTION, POWDER, FOR SOLUTION INTRAMUSCULAR; INTRAVENOUS at 20:17

## 2023-03-07 RX ADMIN — ONDANSETRON 4 MG: 2 INJECTION INTRAMUSCULAR; INTRAVENOUS at 23:54

## 2023-03-07 RX ADMIN — SODIUM CHLORIDE 125 ML/HR: 0.9 INJECTION, SOLUTION INTRAVENOUS at 02:02

## 2023-03-07 RX ADMIN — ONDANSETRON 4 MG: 2 INJECTION INTRAMUSCULAR; INTRAVENOUS at 11:18

## 2023-03-07 RX ADMIN — NYSTATIN 500000 UNITS: 100000 SUSPENSION ORAL at 22:51

## 2023-03-07 RX ADMIN — HYDROMORPHONE HYDROCHLORIDE 1 MG: 1 INJECTION, SOLUTION INTRAMUSCULAR; INTRAVENOUS; SUBCUTANEOUS at 05:15

## 2023-03-07 RX ADMIN — LORAZEPAM 1 MG: 2 INJECTION INTRAMUSCULAR; INTRAVENOUS at 05:15

## 2023-03-07 RX ADMIN — METHYLPREDNISOLONE SODIUM SUCCINATE 60 MG: 125 INJECTION, POWDER, FOR SOLUTION INTRAMUSCULAR; INTRAVENOUS at 08:28

## 2023-03-07 RX ADMIN — BISACODYL 10 MG: 5 TABLET, COATED ORAL at 11:16

## 2023-03-07 RX ADMIN — NYSTATIN 500000 UNITS: 100000 SUSPENSION ORAL at 08:27

## 2023-03-07 RX ADMIN — FAMOTIDINE 20 MG: 10 INJECTION, SOLUTION INTRAVENOUS at 08:27

## 2023-03-07 RX ADMIN — DIPHENHYDRAMINE HYDROCHLORIDE 50 MG: 50 INJECTION, SOLUTION INTRAMUSCULAR; INTRAVENOUS at 16:11

## 2023-03-07 RX ADMIN — FAMOTIDINE 20 MG: 10 INJECTION, SOLUTION INTRAVENOUS at 20:17

## 2023-03-07 RX ADMIN — SODIUM CHLORIDE 125 ML/HR: 0.9 INJECTION, SOLUTION INTRAVENOUS at 23:56

## 2023-03-07 RX ADMIN — HYDROMORPHONE HYDROCHLORIDE 1 MG: 1 INJECTION, SOLUTION INTRAMUSCULAR; INTRAVENOUS; SUBCUTANEOUS at 17:16

## 2023-03-07 RX ADMIN — SODIUM CHLORIDE 125 ML/HR: 0.9 INJECTION, SOLUTION INTRAVENOUS at 17:16

## 2023-03-07 RX ADMIN — DIPHENHYDRAMINE HYDROCHLORIDE 50 MG: 50 INJECTION, SOLUTION INTRAMUSCULAR; INTRAVENOUS at 20:20

## 2023-03-07 RX ADMIN — LORAZEPAM 1 MG: 2 INJECTION INTRAMUSCULAR; INTRAVENOUS at 11:16

## 2023-03-07 RX ADMIN — LORAZEPAM 1 MG: 2 INJECTION INTRAMUSCULAR; INTRAVENOUS at 17:16

## 2023-03-07 RX ADMIN — SODIUM CHLORIDE 125 ML/HR: 0.9 INJECTION, SOLUTION INTRAVENOUS at 08:31

## 2023-03-07 RX ADMIN — DIPHENHYDRAMINE HYDROCHLORIDE 50 MG: 50 INJECTION, SOLUTION INTRAMUSCULAR; INTRAVENOUS at 08:27

## 2023-03-07 RX ADMIN — NYSTATIN 500000 UNITS: 100000 SUSPENSION ORAL at 17:16

## 2023-03-07 RX ADMIN — ONDANSETRON 4 MG: 2 INJECTION INTRAMUSCULAR; INTRAVENOUS at 17:19

## 2023-03-07 RX ADMIN — NYSTATIN 500000 UNITS: 100000 SUSPENSION ORAL at 12:16

## 2023-03-07 RX ADMIN — LORAZEPAM 1 MG: 2 INJECTION INTRAMUSCULAR; INTRAVENOUS at 23:54

## 2023-03-07 RX ADMIN — HYDROMORPHONE HYDROCHLORIDE 1 MG: 1 INJECTION, SOLUTION INTRAMUSCULAR; INTRAVENOUS; SUBCUTANEOUS at 11:16

## 2023-03-07 RX ADMIN — DIPHENHYDRAMINE HYDROCHLORIDE 50 MG: 50 INJECTION, SOLUTION INTRAMUSCULAR; INTRAVENOUS at 04:59

## 2023-03-07 NOTE — ASSESSMENT & PLAN NOTE
· Presentation for [facility] with hives and burning sensation in throat  · Continue IV methylprednisolone 60 mg twice daily, diphenhydramine IV fluids and famotidine as recommended by her outpatient allergist    · Patient states she is getting close to baseline; we will continue with current regimen as above  · Patient hopeful for discharge home in next 1 to 2 days  · Continue Solu-Medrol today, plan for a budesonide taper on discharge hopefully in the next 24 to 48 hours as she did in the past

## 2023-03-07 NOTE — PROGRESS NOTES
[address] [facility]  Progress Note - Joselo Nguyen 1975, 46 y o  female MRN: [id number]  Unit/Bed#: 408-01 Encounter: [id number]  Primary Care Provider: Ruben Dacosta DO   Date and time admitted to hospital: 3/2/2023 10:00 PM    * 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[facility] 75 )  Assessment & Plan  · Presentation for [facility] with hives and burning sensation in throat  · Continue IV methylprednisolone 60 mg twice daily, diphenhydramine IV fluids and famotidine as recommended by her outpatient allergist    · Patient states she is getting close to baseline; we will continue with current regimen as above  · Patient hopeful for discharge home in next 1 to 2 days  · Continue Solu-Medrol today, plan for a budesonide taper on discharge hopefully in the next 24 to 48 hours as she did in the past    Constipation  Assessment & Plan  · Patient wants BM before discharge  · Various allergies and sensitivities  · Dulcolax 10 mg daily    Crohn's disease of colon with complication ([facility] 75 )  Assessment & Plan  · Stelara every 3 weeks  · Seen by GI this admission who ordered fecal calprotectin, not collected  · CRP normal  · Restart budesonide upon discharge    Hypothyroidism (acquired)  Assessment & Plan  · Restart patients levothyroxine at time of discharge        VTE Pharmacologic Prophylaxis: VTE Score: 2 Low Risk (Score 0-2) - Encourage Ambulation  Patient Centered Rounds: I performed bedside rounds with nursing staff today  Discussions with Specialists or Other Care Team Provider: ELISABETH     Education and Discussions with Family / Patient: Patient declined call to        Total Time Spent on Date of Encounter in care of patient: 35 minutes This time was spent on one or more of the following: performing physical exam; counseling and coordination of care; obtaining or reviewing history; documenting in the medical record; reviewing/ordering tests, medications or procedures; communicating with other healthcare professionals and discussing with patient's family/caregivers  Current Length of Stay: 4 day(s)  Current Patient Status: Inpatient   Certification Statement: The patient will continue to require additional inpatient hospital stay due to IV steroids, Benadryl, mast cell activation syndrome  Discharge Plan: Anticipate discharge in 24-48 hrs to home  Code Status: Level 1 - Full Code    Subjective:   Patient seen and examined  Doing a bit better  Still constipated  No fevers  Tolerating diet  Objective:     Vitals:   Temp (24hrs), Av 3 °F (36 8 °C), Min:97 6 °F (36 4 °C), Max:99 °F (37 2 °C)    Temp:  [97 6 °F (36 4 °C)-99 °F (37 2 °C)] 98 4 °F (36 9 °C)  HR:  [74-86] 74  Resp:  [15-16] 16  BP: (118-131)/(70-87) 118/70  SpO2:  [96 %] 96 %  Body mass index is 25 64 kg/m²  Input and Output Summary (last 24 hours): Intake/Output Summary (Last 24 hours) at 3/7/2023 1046  Last data filed at 3/7/2023 0831  Gross per 24 hour   Intake 5222 5 ml   Output --   Net 5222 5 ml       PHYSICAL EXAM:    Vitals signs reviewed  Constitutional   Awake and cooperative  NAD  Head/Neck   Normocephalic  Atraumatic  HEENT   No scleral icterus  EOMI  Heart   Regular rate and rhythm  No murmers  Lungs   Clear to auscultation bilaterally  Respirations unlaboured  Abdomen   Soft  Nontender  Nondistended  Skin   Skin color normal  No rashes  Extremities   No deformities  No peripheral edema  Neuro   Alert and oriented  No new deficits  Psych   Mood stable   Affect normal          Additional Data:     Labs:  Results from last 7 days   Lab Units 23  0507   WBC Thousand/uL 12 61*   HEMOGLOBIN g/dL 12 8   HEMATOCRIT % 38 7   PLATELETS Thousands/uL 440*   NEUTROS PCT % 86*   LYMPHS PCT % 10*   MONOS PCT % 4   EOS PCT % 0     Results from last 7 days   Lab Units 23  0507 23  0508   SODIUM mmol/L 138 138   POTASSIUM mmol/L 4 0 3 9   CHLORIDE mmol/L 105 105   CO2 mmol/L 25 25   BUN mg/dL 10 8 CREATININE mg/dL 0 68 0 58*   ANION GAP mmol/L 8 8   CALCIUM mg/dL 8 3* 8 3*   ALBUMIN g/dL  --  3 5   TOTAL BILIRUBIN mg/dL  --  0 25   ALK PHOS U/L  --  40   ALT U/L  --  13   AST U/L  --  9*   GLUCOSE RANDOM mg/dL 161* 133     Results from last 7 days   Lab Units 03/03/23  1054   INR  0 94     Results from last 7 days   Lab Units 03/07/23  0809 03/06/23  1605 03/06/23  1110 03/06/23  0751 03/05/23  2341 03/05/23  1605 03/05/23  1121 03/05/23  0712 03/04/23  1634 03/04/23  1110 03/04/23  0755 03/03/23  2203   POC GLUCOSE mg/dl 94 116 100 98 149* 175* 100 105 125 88 65 207*               Lines/Drains:  Invasive Devices     Peripheral Intravenous Line  Duration           Peripheral IV 03/02/23 Dorsal (posterior); Right Forearm 4 days    Peripheral IV 03/06/23 Dorsal (posterior); Right Hand <1 day                      Imaging: No pertinent imaging reviewed      Recent Cultures (last 7 days):         Last 24 Hours Medication List:   Current Facility-Administered Medications   Medication Dose Route Frequency Provider Last Rate   • bisacodyl  10 mg Oral Daily Jesus Lenora Ruiz DO     • diphenhydrAMINE  50 mg Intramuscular Q4H Claribel Mei PA-C     • famotidine  20 mg Intravenous Q12H [address] Claribel Mei PA-C     • HYDROmorphone  0 5 mg Intravenous Q6H PRN Gunnar Gutierrez PA-C      Or   • HYDROmorphone  1 mg Intravenous Q6H PRN Gunnar Gutierrez PA-C     • levalbuterol  1 25 mg Nebulization Q4H PRN Gunnar Gutierrez PA-C     • LORazepam  1 mg Intravenous Q6H PRN Gunnar Gutierrez PA-C     • methylPREDNISolone sodium succinate  60 mg Intravenous Q12H [address] Claribel Mei PA-C     • nystatin  500,000 Units Swish & Spit 4x Daily Marie Orta DO     • ondansetron  4 mg Intravenous Q4H PRN Marie Orta DO     • sodium chloride  125 mL/hr Intravenous Continuous Jenni Embs,  mL/hr (03/07/23 0831)        Today, Patient Was Seen By: Jesus Ruiz DO    **Please Note: This note may have been constructed using a voice recognition system  **

## 2023-03-07 NOTE — PLAN OF CARE
Problem: GASTROINTESTINAL - ADULT  Goal: Minimal or absence of nausea and/or vomiting  Description: INTERVENTIONS:  - Administer IV fluids if ordered to ensure adequate hydration  - Administer ordered antiemetic medications as needed  - Provide nonpharmacologic comfort measures as appropriate  - Advance diet as tolerated, if ordered  - Consider nutrition services referral to assist patient with adequate nutrition and appropriate food choices  Outcome: Progressing     Problem: METABOLIC, FLUID AND ELECTROLYTES - ADULT  Goal: Electrolytes maintained within normal limits  Description: INTERVENTIONS:  - Monitor labs and assess patient for signs and symptoms of electrolyte imbalances  - Administer electrolyte replacement as ordered  - Monitor response to electrolyte replacements, including repeat lab results as appropriate  - Instruct patient on fluid and nutrition as appropriate  Outcome: Progressing  Goal: Fluid balance maintained  Description: INTERVENTIONS:  - Monitor labs   - Monitor I/O and WT  - Instruct patient on fluid and nutrition as appropriate  - Assess for signs & symptoms of volume excess or deficit  Outcome: Progressing     Problem: PAIN - ADULT  Goal: Verbalizes/displays adequate comfort level or baseline comfort level  Description: Interventions:  - Encourage patient to monitor pain and request assistance  - Assess pain using appropriate pain scale  - Administer analgesics based on type and severity of pain and evaluate response  - Implement non-pharmacological measures as appropriate and evaluate response  - Consider cultural and social influences on pain and pain management  - Notify physician/advanced practitioner if interventions unsuccessful or patient reports new pain  Outcome: Progressing     Problem: DISCHARGE PLANNING - CARE MANAGEMENT  Goal: Discharge to post-acute care or home with appropriate resources  Description: INTERVENTIONS:  - Conduct assessment to determine patient/family and health care team treatment goals, and need for post-acute services based on payer coverage, community resources, and patient preferences, and barriers to discharge  - Address psychosocial, clinical, and financial barriers to discharge as identified in assessment in conjunction with the patient/family and health care team  - Arrange appropriate level of post-acute services according to patient’s   needs and preference and payer coverage in collaboration with the physician and health care team  - Communicate with and update the patient/family, physician, and health care team regarding progress on the discharge plan  - Arrange appropriate transportation to post-acute venues  Outcome: Progressing

## 2023-03-08 ENCOUNTER — APPOINTMENT (INPATIENT)
Dept: RADIOLOGY | Facility: HOSPITAL | Age: 48
End: 2023-03-08

## 2023-03-08 LAB
GLUCOSE SERPL-MCNC: 158 MG/DL (ref 65–140)
GLUCOSE SERPL-MCNC: 175 MG/DL (ref 65–140)
GLUCOSE SERPL-MCNC: 94 MG/DL (ref 65–140)
GLUCOSE SERPL-MCNC: 99 MG/DL (ref 65–140)

## 2023-03-08 RX ORDER — BISACODYL 5 MG/1
5 TABLET, DELAYED RELEASE ORAL ONCE
Status: DISCONTINUED | OUTPATIENT
Start: 2023-03-08 | End: 2023-03-08

## 2023-03-08 RX ORDER — DIPHENHYDRAMINE HYDROCHLORIDE 50 MG/ML
25 INJECTION INTRAMUSCULAR; INTRAVENOUS EVERY 6 HOURS PRN
Qty: 25 ML | Refills: 0 | Status: SHIPPED | OUTPATIENT
Start: 2023-03-08

## 2023-03-08 RX ORDER — BISACODYL 5 MG/1
10 TABLET, DELAYED RELEASE ORAL ONCE
Status: COMPLETED | OUTPATIENT
Start: 2023-03-08 | End: 2023-03-08

## 2023-03-08 RX ADMIN — METHYLPREDNISOLONE SODIUM SUCCINATE 60 MG: 125 INJECTION, POWDER, FOR SOLUTION INTRAMUSCULAR; INTRAVENOUS at 08:30

## 2023-03-08 RX ADMIN — DIPHENHYDRAMINE HYDROCHLORIDE 50 MG: 50 INJECTION, SOLUTION INTRAMUSCULAR; INTRAVENOUS at 04:19

## 2023-03-08 RX ADMIN — HYDROMORPHONE HYDROCHLORIDE 1 MG: 1 INJECTION, SOLUTION INTRAMUSCULAR; INTRAVENOUS; SUBCUTANEOUS at 06:07

## 2023-03-08 RX ADMIN — NYSTATIN 500000 UNITS: 100000 SUSPENSION ORAL at 12:01

## 2023-03-08 RX ADMIN — NYSTATIN 500000 UNITS: 100000 SUSPENSION ORAL at 08:30

## 2023-03-08 RX ADMIN — DIPHENHYDRAMINE HYDROCHLORIDE 50 MG: 50 INJECTION, SOLUTION INTRAMUSCULAR; INTRAVENOUS at 16:21

## 2023-03-08 RX ADMIN — FAMOTIDINE 20 MG: 10 INJECTION, SOLUTION INTRAVENOUS at 08:30

## 2023-03-08 RX ADMIN — ONDANSETRON 4 MG: 2 INJECTION INTRAMUSCULAR; INTRAVENOUS at 18:09

## 2023-03-08 RX ADMIN — ONDANSETRON 4 MG: 2 INJECTION INTRAMUSCULAR; INTRAVENOUS at 06:07

## 2023-03-08 RX ADMIN — NYSTATIN 500000 UNITS: 100000 SUSPENSION ORAL at 21:48

## 2023-03-08 RX ADMIN — LORAZEPAM 1 MG: 2 INJECTION INTRAMUSCULAR; INTRAVENOUS at 18:09

## 2023-03-08 RX ADMIN — DIPHENHYDRAMINE HYDROCHLORIDE 50 MG: 50 INJECTION, SOLUTION INTRAMUSCULAR; INTRAVENOUS at 08:30

## 2023-03-08 RX ADMIN — FAMOTIDINE 20 MG: 10 INJECTION, SOLUTION INTRAVENOUS at 20:10

## 2023-03-08 RX ADMIN — BISACODYL 10 MG: 5 TABLET, COATED ORAL at 14:35

## 2023-03-08 RX ADMIN — HYDROMORPHONE HYDROCHLORIDE 1 MG: 1 INJECTION, SOLUTION INTRAMUSCULAR; INTRAVENOUS; SUBCUTANEOUS at 18:09

## 2023-03-08 RX ADMIN — METHYLNALTREXONE BROMIDE 12 MG: 12 INJECTION, SOLUTION SUBCUTANEOUS at 14:35

## 2023-03-08 RX ADMIN — SODIUM CHLORIDE 125 ML/HR: 0.9 INJECTION, SOLUTION INTRAVENOUS at 07:54

## 2023-03-08 RX ADMIN — DIPHENHYDRAMINE HYDROCHLORIDE 50 MG: 50 INJECTION, SOLUTION INTRAMUSCULAR; INTRAVENOUS at 20:10

## 2023-03-08 RX ADMIN — NYSTATIN 500000 UNITS: 100000 SUSPENSION ORAL at 18:10

## 2023-03-08 RX ADMIN — HYDROMORPHONE HYDROCHLORIDE 1 MG: 1 INJECTION, SOLUTION INTRAMUSCULAR; INTRAVENOUS; SUBCUTANEOUS at 12:06

## 2023-03-08 RX ADMIN — DIPHENHYDRAMINE HYDROCHLORIDE 50 MG: 50 INJECTION, SOLUTION INTRAMUSCULAR; INTRAVENOUS at 12:01

## 2023-03-08 RX ADMIN — SODIUM CHLORIDE 125 ML/HR: 0.9 INJECTION, SOLUTION INTRAVENOUS at 16:21

## 2023-03-08 RX ADMIN — LORAZEPAM 1 MG: 2 INJECTION INTRAMUSCULAR; INTRAVENOUS at 12:06

## 2023-03-08 RX ADMIN — ONDANSETRON 4 MG: 2 INJECTION INTRAMUSCULAR; INTRAVENOUS at 12:06

## 2023-03-08 RX ADMIN — METHYLPREDNISOLONE SODIUM SUCCINATE 60 MG: 125 INJECTION, POWDER, FOR SOLUTION INTRAMUSCULAR; INTRAVENOUS at 20:10

## 2023-03-08 RX ADMIN — BISACODYL 10 MG: 5 TABLET, COATED ORAL at 08:30

## 2023-03-08 RX ADMIN — LORAZEPAM 1 MG: 2 INJECTION INTRAMUSCULAR; INTRAVENOUS at 06:03

## 2023-03-08 NOTE — PROGRESS NOTES
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, immune deficiency and lack of response to pneumococcal vaccines on Hyzentra, hypothyroidism  Presented to hospital with flare of mast cell activation syndrome, admitted to be monitored and treated with epinephrine, IV Solu-Medrol, IM Benadryl, IV Pepcid, and as needed Xopenex and Ativan  GI consulted for IBD and worsening diarrhea  Inflammatory bowel disease   Mast cell activation syndrome   Acute constipation    · Patient initially was complaining of diarrhea, fecal calprotectin was ordered, though she subsequently developed constipation  · She does follow with GI in the outpatient setting and is on Stelara therapy for her history of IBD  · We will check abdominal series now to ensure no obstructive pattern, though clinically does not seem consistent with this  · Okay for diet as as tolerated from GI standpoint  · Okay for laxatives as is tolerated, patient has utilized Dulcolax, Dr Villa Edmonds recommends prune juice if she tolerates  Has had adverse effect to   · Okay for budesonide taper at time of discharge for IBD per GI  · She has outpatient endoscopic evaluation planned for 05/2023      GI will be available should any new issues arise   ______________________________________________________________________    Subjective:     Patient is a 46 y o  female  w/ pmhx sig for hereditary alpha tryptasemia, MCAS on Xolair, inflammatory bowel disease (likely Crohn's disease versus indeterminate colitis versus ulcerative colitis) previously on anti TNF therapy but complicated by a 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, immune deficiency and lack of response to pneumococcal vaccines on Hyzentra, hypothyroidism  Admitted for flare of mast cell disease  GI consulted given her history of Crohn's  Interval events:   Patient shares no bowel movement for 5 days  Is passing some flatus intermittently  She is tolerating p o  intake without issue  No emesis      Medication Administration - last 24 hours from 03/07/2023 1343 to 03/08/2023 1343       Date/Time Order Dose Route Action Action by     03/08/2023 0754 EST sodium chloride 0 9 % infusion 125 mL/hr Intravenous [address] Adolm Cuff, RN     03/07/2023 2356 EST sodium chloride 0 9 % infusion 125 mL/hr Intravenous [address] Fawn Rudd, [address]     03/07/2023 1716 EST sodium chloride 0 9 % infusion 125 mL/hr Intravenous [address] Adolm Cuff, RN     03/08/2023 1206 EST LORazepam (ATIVAN) injection 1 mg 1 mg Intravenous Given Adolm Cuff, RN     03/08/2023 0603 EST LORazepam (ATIVAN) injection 1 mg 1 mg Intravenous Given Jordan Peruvian, RN     03/07/2023 2354 EST LORazepam (ATIVAN) injection 1 mg 1 mg Intravenous Given Jordan Peruvian, RN     03/07/2023 1716 EST LORazepam (ATIVAN) injection 1 mg 1 mg Intravenous Given Adolm Cuff, RN     03/08/2023 1206 EST HYDROmorphone (DILAUDID) injection 0 5 mg -- Intravenous See Alternative Adolm Cuff, RN     03/08/2023 4691 EST HYDROmorphone (DILAUDID) injection 0 5 mg -- Intravenous See Alternative Jordan Peruvian, RN     03/07/2023 2349 EST HYDROmorphone (DILAUDID) injection 0 5 mg -- Intravenous See Alternative Jordan Peruvian, RN     03/07/2023 1716 EST HYDROmorphone (DILAUDID) injection 0 5 mg -- Intravenous See Alternative Adolm Cuff, RN     03/08/2023 1206 EST HYDROmorphone (DILAUDID) injection 1 mg 1 mg Intravenous Given Adolm Cuff, RN     03/08/2023 7122 EST HYDROmorphone (DILAUDID) injection 1 mg 1 mg Intravenous Given Jordan Peruvian, RN     03/07/2023 2349 EST HYDROmorphone (DILAUDID) injection 1 mg 1 mg Intravenous Given Jordan Singh RN 03/07/2023 1716 EST HYDROmorphone (DILAUDID) injection 1 mg 1 mg Intravenous Given [facility], RN     03/08/2023 1201 EST diphenhydrAMINE (BENADRYL) injection 50 mg 50 mg Intramuscular Given [facility], RN     03/08/2023 0830 EST diphenhydrAMINE (BENADRYL) injection 50 mg 50 mg Intramuscular Given [facility], RN     03/08/2023 0419 EST diphenhydrAMINE (BENADRYL) injection 50 mg 50 mg [facility]amuscular [facility], RN     03/07/2023 2350 EST diphenhydrAMINE (BENADRYL) injection 50 mg 50 mg Intramuscular Given [facility], RN     03/07/2023 2020 EST diphenhydrAMINE (BENADRYL) injection 50 mg 50 mg Intramuscular Given [facility], RN     03/07/2023 1611 EST diphenhydrAMINE (BENADRYL) injection 50 mg 50 mg Intramuscular Given [facility], RN     03/08/2023 0830 EST Famotidine (PF) (PEPCID) injection 20 mg 20 mg Intravenous Given [facility], RN     03/07/2023 2017 EST Famotidine (PF) (PEPCID) injection 20 mg 20 mg Intravenous Given [facility], RN     03/08/2023 0830 EST methylPREDNISolone sodium succinate (Solu-MEDROL) injection 60 mg 60 mg Intravenous Given [facility], RN     03/07/2023 2017 EST methylPREDNISolone sodium succinate (Solu-MEDROL) injection 60 mg 60 mg Intravenous Given [facility], RN     03/08/2023 1201 EST nystatin (MYCOSTATIN) oral suspension 500,000 Units 500,000 Units Swish & Spit Given [facility], RN     03/08/2023 0830 EST nystatin (MYCOSTATIN) oral suspension 500,000 Units 500,000 Units Swish & Spit Given [facility], RN     03/07/2023 2251 EST nystatin (MYCOSTATIN) oral suspension 500,000 Units 500,000 Units Swish & Spit Given [facility] , RN     03/07/2023 1716 EST nystatin (MYCOSTATIN) oral suspension 500,000 Units 500,000 Units Swish & Spit Given [facility], RN     03/08/2023 1206 EST ondansetron (ZOFRAN) injection 4 mg 4 mg Intravenous Given Jus Hackett RN     03/08/2023 7985 EST ondansetron (ZOFRAN) injection 4 mg 4 mg Intravenous Given Elizabeth Christy RN 03/07/2023 2354 EST ondansetron (ZOFRAN) injection 4 mg 4 mg Intravenous Given Agnes Gordon RN     03/07/2023 1719 EST ondansetron (ZOFRAN) injection 4 mg 4 mg Intravenous Given Keyonna Barlow RN     03/08/2023 0830 EST bisacodyl (DULCOLAX) EC tablet 10 mg 10 mg Oral Given eKyonna Barlow RN        Review of Systems   Per HPI    Objective:     Vitals: Blood pressure 129/88, pulse 85, temperature (!) 97 3 °F (36 3 °C), temperature source Temporal, resp  rate 19, height 5' 5" (1 651 m), weight 69 9 kg (154 lb 1 6 oz), SpO2 95 %, not currently breastfeeding  ,Body mass index is 25 64 kg/m²  Intake/Output Summary (Last 24 hours) at 3/8/2023 1343  Last data filed at 3/8/2023 1249  Gross per 24 hour   Intake 3694 58 ml   Output --   Net 3694 58 ml     Physical Exam  Vitals and nursing note reviewed  HENT:      Head: Normocephalic and atraumatic  Eyes:      General: No scleral icterus  Conjunctiva/sclera: Conjunctivae normal    Abdominal:      General: Abdomen is flat  Bowel sounds are normal  There is no distension  Palpations: Abdomen is soft  There is no mass  Tenderness: There is abdominal tenderness (minor)  There is no guarding or rebound  Skin:     General: Skin is warm and dry  Coloration: Skin is not jaundiced or pale  Neurological:      General: No focal deficit present  Mental Status: She is alert and oriented to person, place, and time  Psychiatric:         Mood and Affect: Mood normal        Invasive Devices     Peripheral Intravenous Line  Duration           Peripheral IV 03/06/23 Dorsal (posterior); Right Hand 1 day              Lab Results:  No results displayed because visit has over 200 results  Imaging Studies: I have personally reviewed pertinent imaging studies  Gopal Caban PA-C    **Please note:  Dictation voice to text software may have been used in the creation of this record    Occasional wrong word or “sound alike” substitutions may have occurred due to the inherent limitations of voice recognition software  Read the chart carefully and recognize, using context, where substitutions have occurred  **

## 2023-03-08 NOTE — PLAN OF CARE
Problem: METABOLIC, FLUID AND ELECTROLYTES - ADULT  Goal: Electrolytes maintained within normal limits  Description: INTERVENTIONS:  - Monitor labs and assess patient for signs and symptoms of electrolyte imbalances  - Administer electrolyte replacement as ordered  - Monitor response to electrolyte replacements, including repeat lab results as appropriate  - Instruct patient on fluid and nutrition as appropriate  Outcome: Progressing  Goal: Fluid balance maintained  Description: INTERVENTIONS:  - Monitor labs   - Monitor I/O and WT  - Instruct patient on fluid and nutrition as appropriate  - Assess for signs & symptoms of volume excess or deficit  Outcome: Progressing  Goal: Glucose maintained within target range  Description: INTERVENTIONS:  - Monitor Blood Glucose as ordered  - Assess for signs and symptoms of hyperglycemia and hypoglycemia  - Administer ordered medications to maintain glucose within target range  - Assess nutritional intake and initiate nutrition service referral as needed  Outcome: Progressing     Problem: PAIN - ADULT  Goal: Verbalizes/displays adequate comfort level or baseline comfort level  Description: Interventions:  - Encourage patient to monitor pain and request assistance  - Assess pain using appropriate pain scale  - Administer analgesics based on type and severity of pain and evaluate response  - Implement non-pharmacological measures as appropriate and evaluate response  - Consider cultural and social influences on pain and pain management  - Notify physician/advanced practitioner if interventions unsuccessful or patient reports new pain  Outcome: Progressing     Problem: GASTROINTESTINAL - ADULT  Goal: Minimal or absence of nausea and/or vomiting  Description: INTERVENTIONS:  - Administer IV fluids if ordered to ensure adequate hydration  - Maintain NPO status until nausea and vomiting are resolved  - Nasogastric tube if ordered  - Administer ordered antiemetic medications as needed  - Provide nonpharmacologic comfort measures as appropriate  - Advance diet as tolerated, if ordered  - Consider nutrition services referral to assist patient with adequate nutrition and appropriate food choices  Outcome: Progressing     Problem: DISCHARGE PLANNING - CARE MANAGEMENT  Goal: Discharge to post-acute care or home with appropriate resources  Description: INTERVENTIONS:  - Conduct assessment to determine patient/family and health care team treatment goals, and need for post-acute services based on payer coverage, community resources, and patient preferences, and barriers to discharge  - Address psychosocial, clinical, and financial barriers to discharge as identified in assessment in conjunction with the patient/family and health care team  - Arrange appropriate level of post-acute services according to patient’s   needs and preference and payer coverage in collaboration with the physician and health care team  - Communicate with and update the patient/family, physician, and health care team regarding progress on the discharge plan  - Arrange appropriate transportation to post-acute venues  Outcome: Progressing

## 2023-03-08 NOTE — ASSESSMENT & PLAN NOTE
· Patient wants BM before discharge  · Various allergies and sensitivities  · KUB without signs of obstruction  · Dulcolax 10 mg daily, give extra dose now  · Also provide with relistor given she is receiving IV pain meds

## 2023-03-08 NOTE — PROGRESS NOTES
[facility]  Progress Note - Ermelinda Matson 1975, 46 y o  female MRN: [id number]  Unit/Bed#: 048-83 Encounter: [id number]  Primary Care Provider: Santy Maynard DO   Date and time admitted to hospital: 3/2/2023 10:00 PM    Crohn's disease of colon with complication ([address] )  Assessment & Plan  · Stelara every 3 weeks  · Seen by GI this admission who ordered fecal calprotectin, not collected  · CRP normal  · Restart budesonide upon discharge    Constipation  Assessment & Plan  · Patient wants BM before discharge  · Various allergies and sensitivities  · KUB without signs of obstruction  · Dulcolax 10 mg daily, give extra dose now  · Also provide with relistor given she is receiving IV pain meds    Hypothyroidism (acquired)  Assessment & Plan  · Restart patients levothyroxine at time of discharge    * 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  Assessment & Plan  · Presentation for MCAS with hives and burning sensation in throat  · Continue IV methylprednisolone 60 mg twice daily, diphenhydramine IV fluids and famotidine as recommended by her outpatient allergist    · Patient states she is getting close to baseline; we will continue with current regimen as above  · Patient hopeful for discharge home tomorrow  · Continue Solu-Medrol today, plan for a budesonide taper on discharge hopefully in the next 24 hours   · Patient requesting IM benadryl for home for emergencies  I believe this is reasonable  Prescription sent  VTE Pharmacologic Prophylaxis: VTE Score: 2 Low Risk (Score 0-2) - Encourage Ambulation  Patient Centered Rounds: I performed bedside rounds with nursing staff today  Discussions with Specialists or Other Care Team Provider: case management, GI    Education and Discussions with Family / Patient: Patient declined call to        Total Time Spent on Date of Encounter in care of patient: 45 minutes This time was spent on one or more of the following: performing physical exam; counseling and coordination of care; obtaining or reviewing history; documenting in the medical record; reviewing/ordering tests, medications or procedures; communicating with other healthcare professionals and discussing with patient's family/caregivers  Current Length of Stay: 5 day(s)  Current Patient Status: Inpatient   Certification Statement: The patient will continue to require additional inpatient hospital stay due to need for BM, monitoring symptoms and electrolytes  Discharge Plan: Anticipate discharge tomorrow to home  Code Status: Level 1 - Full Code    Subjective:   Patient reports small bowel movement  States she has not noted any rashes or hives  Objective:     Vitals:   Temp (24hrs), Av 6 °F (36 4 °C), Min:97 3 °F (36 3 °C), Max:97 9 °F (36 6 °C)    Temp:  [97 3 °F (36 3 °C)-97 9 °F (36 6 °C)] 97 3 °F (36 3 °C)  HR:  [73-90] 85  Resp:  [16-19] 19  BP: (119-135)/(72-91) 129/88  SpO2:  [92 %-97 %] 95 %  Body mass index is 25 64 kg/m²  Input and Output Summary (last 24 hours): Intake/Output Summary (Last 24 hours) at 3/8/2023 1359  Last data filed at 3/8/2023 1249  Gross per 24 hour   Intake 3694 58 ml   Output --   Net 3694 58 ml       Physical Exam:   Physical Exam  Vitals and nursing note reviewed  Constitutional:       General: She is not in acute distress  HENT:      Head: Normocephalic and atraumatic  Cardiovascular:      Rate and Rhythm: Normal rate and regular rhythm  Pulses: Normal pulses  Heart sounds: Normal heart sounds  No murmur heard  No gallop  Pulmonary:      Effort: Pulmonary effort is normal       Breath sounds: Normal breath sounds  No wheezing, rhonchi or rales  Abdominal:      General: Bowel sounds are normal       Palpations: Abdomen is soft  Tenderness: There is no abdominal tenderness  There is no guarding or rebound  Musculoskeletal:      Right lower leg: No edema  Left lower leg: No edema     Skin: General: Skin is warm and dry  Neurological:      General: No focal deficit present  Mental Status: She is alert and oriented to person, place, and time  Mental status is at baseline  Psychiatric:         Mood and Affect: Mood normal          Behavior: Behavior normal           Additional Data:     Labs:  Results from last 7 days   Lab Units 03/07/23  0507   WBC Thousand/uL 12 61*   HEMOGLOBIN g/dL 12 8   HEMATOCRIT % 38 7   PLATELETS Thousands/uL 440*   NEUTROS PCT % 86*   LYMPHS PCT % 10*   MONOS PCT % 4   EOS PCT % 0     Results from last 7 days   Lab Units 03/07/23  0507 03/06/23  0508   SODIUM mmol/L 138 138   POTASSIUM mmol/L 4 0 3 9   CHLORIDE mmol/L 105 105   CO2 mmol/L 25 25   BUN mg/dL 10 8   CREATININE mg/dL 0 68 0 58*   ANION GAP mmol/L 8 8   CALCIUM mg/dL 8 3* 8 3*   ALBUMIN g/dL  --  3 5   TOTAL BILIRUBIN mg/dL  --  0 25   ALK PHOS U/L  --  40   ALT U/L  --  13   AST U/L  --  9*   GLUCOSE RANDOM mg/dL 161* 133     Results from last 7 days   Lab Units 03/03/23  1054   INR  0 94     Results from last 7 days   Lab Units 03/08/23  1131 03/08/23  0727 03/07/23  2252 03/07/23  1820 03/07/23  1118 03/07/23  0809 03/06/23  1605 03/06/23  1110 03/06/23  0751 03/05/23  2341 03/05/23  1605 03/05/23  1121   POC GLUCOSE mg/dl 94 99 126 185* 94 94 116 100 98 149* 175* 100               Lines/Drains:  Invasive Devices     Peripheral Intravenous Line  Duration           Peripheral IV 03/06/23 Dorsal (posterior); Right Hand 1 day                      Imaging: Personally reviewed the following imaging: xray(s)    Recent Cultures (last 7 days):         Last 24 Hours Medication List:   Current Facility-Administered Medications   Medication Dose Route Frequency Provider Last Rate   • bisacodyl  10 mg Oral Daily Burke Ruzi DO     • bisacodyl  10 mg Oral Once Leonadr Crenshaw PA-C     • diphenhydrAMINE  50 mg Intramuscular Q4H Claribel Mei PA-C     • famotidine  20 mg Intravenous Q12H [facility] Johnathon Marley PA-C     • HYDROmorphone  0 5 mg Intravenous Q6H PRN Rae Neri PA-C      Or   • HYDROmorphone  1 mg Intravenous Q6H PRN Rae Neri PA-C     • levalbuterol  1 25 mg Nebulization Q4H PRN Rae Neri PA-C     • LORazepam  1 mg Intravenous Q6H PRN Rae Neri PA-C     • methylnaltrexone  12 mg Subcutaneous Once Andre Laoz PA-C     • methylPREDNISolone sodium succinate  60 mg Intravenous Q12H [facility] Claribelcristofer Mei PA-C     • nystatin  500,000 Units Swish & Spit 4x Daily Adri Cooper DO     • ondansetron  4 mg Intravenous Q4H PRN Adri Cooper DO     • sodium chloride  125 mL/hr Intravenous Continuous Derrick Darnell  mL/hr (03/08/23 0754)        Today, Patient Was Seen By: Andre Lazo PA-C    **Please Note: This note may have been constructed using a voice recognition system  **

## 2023-03-08 NOTE — ASSESSMENT & PLAN NOTE
· Presentation for MCAS with hives and burning sensation in throat  · Continue IV methylprednisolone 60 mg twice daily, diphenhydramine IV fluids and famotidine as recommended by her outpatient allergist    · Patient states she is getting close to baseline; we will continue with current regimen as above  · Patient hopeful for discharge home tomorrow  · Continue Solu-Medrol today, plan for a budesonide taper on discharge hopefully in the next 24 hours   · Patient requesting IM benadryl for home for emergencies  I believe this is reasonable  Prescription sent

## 2023-03-08 NOTE — TELEPHONE ENCOUNTER
Shekhar for pt and mailed miralax and dulcolax prep, [facility] on schedule [facility]

## 2023-03-09 LAB
ALBUMIN SERPL BCP-MCNC: 3.4 G/DL (ref 3.5–5)
ALP SERPL-CCNC: 50 U/L (ref 34–104)
ALT SERPL W P-5'-P-CCNC: 23 U/L (ref 7–52)
ANION GAP SERPL CALCULATED.3IONS-SCNC: 7 MMOL/L (ref 4–13)
AST SERPL W P-5'-P-CCNC: 25 U/L (ref 13–39)
BASOPHILS # BLD AUTO: 0.03 THOUSANDS/ÂΜL (ref 0–0.1)
BASOPHILS NFR BLD AUTO: 0 % (ref 0–1)
BILIRUB SERPL-MCNC: 0.34 MG/DL (ref 0.2–1)
BUN SERPL-MCNC: 17 MG/DL (ref 5–25)
CALCIUM ALBUM COR SERPL-MCNC: 8.4 MG/DL (ref 8.3–10.1)
CALCIUM SERPL-MCNC: 7.9 MG/DL (ref 8.4–10.2)
CHLORIDE SERPL-SCNC: 103 MMOL/L (ref 96–108)
CO2 SERPL-SCNC: 27 MMOL/L (ref 21–32)
CREAT SERPL-MCNC: 0.72 MG/DL (ref 0.6–1.3)
EOSINOPHIL # BLD AUTO: 0 THOUSAND/ÂΜL (ref 0–0.61)
EOSINOPHIL NFR BLD AUTO: 0 % (ref 0–6)
ERYTHROCYTE [DISTWIDTH] IN BLOOD BY AUTOMATED COUNT: 14.8 % (ref 11.6–15.1)
GFR SERPL CREATININE-BSD FRML MDRD: 97 ML/MIN/1.73SQ M
GLUCOSE SERPL-MCNC: 112 MG/DL (ref 65–140)
GLUCOSE SERPL-MCNC: 152 MG/DL (ref 65–140)
GLUCOSE SERPL-MCNC: 172 MG/DL (ref 65–140)
GLUCOSE SERPL-MCNC: 97 MG/DL (ref 65–140)
GLUCOSE SERPL-MCNC: 98 MG/DL (ref 65–140)
HCT VFR BLD AUTO: 42.6 % (ref 34.8–46.1)
HGB BLD-MCNC: 14.2 G/DL (ref 11.5–15.4)
IMM GRANULOCYTES # BLD AUTO: 0.17 THOUSAND/UL (ref 0–0.2)
IMM GRANULOCYTES NFR BLD AUTO: 1 % (ref 0–2)
LYMPHOCYTES # BLD AUTO: 0.49 THOUSANDS/ÂΜL (ref 0.6–4.47)
LYMPHOCYTES NFR BLD AUTO: 2 % (ref 14–44)
MAGNESIUM SERPL-MCNC: 2.1 MG/DL (ref 1.9–2.7)
MCH RBC QN AUTO: 31.1 PG (ref 26.8–34.3)
MCHC RBC AUTO-ENTMCNC: 33.3 G/DL (ref 31.4–37.4)
MCV RBC AUTO: 93 FL (ref 82–98)
MONOCYTES # BLD AUTO: 1.37 THOUSAND/ÂΜL (ref 0.17–1.22)
MONOCYTES NFR BLD AUTO: 5 % (ref 4–12)
NEUTROPHILS # BLD AUTO: 23.83 THOUSANDS/ÂΜL (ref 1.85–7.62)
NEUTS SEG NFR BLD AUTO: 92 % (ref 43–75)
NRBC BLD AUTO-RTO: 0 /100 WBCS
PLATELET # BLD AUTO: 421 THOUSANDS/UL (ref 149–390)
PMV BLD AUTO: 9.3 FL (ref 8.9–12.7)
POTASSIUM SERPL-SCNC: 3.6 MMOL/L (ref 3.5–5.3)
PROT SERPL-MCNC: 5.8 G/DL (ref 6.4–8.4)
RBC # BLD AUTO: 4.56 MILLION/UL (ref 3.81–5.12)
SODIUM SERPL-SCNC: 137 MMOL/L (ref 135–147)
WBC # BLD AUTO: 25.89 THOUSAND/UL (ref 4.31–10.16)

## 2023-03-09 RX ORDER — ACETAMINOPHEN 325 MG/1
975 TABLET ORAL ONCE
Status: DISCONTINUED | OUTPATIENT
Start: 2023-03-09 | End: 2023-03-09

## 2023-03-09 RX ADMIN — DIPHENHYDRAMINE HYDROCHLORIDE 50 MG: 50 INJECTION, SOLUTION INTRAMUSCULAR; INTRAVENOUS at 00:15

## 2023-03-09 RX ADMIN — HYDROMORPHONE HYDROCHLORIDE 1 MG: 1 INJECTION, SOLUTION INTRAMUSCULAR; INTRAVENOUS; SUBCUTANEOUS at 06:17

## 2023-03-09 RX ADMIN — NYSTATIN 500000 UNITS: 100000 SUSPENSION ORAL at 09:52

## 2023-03-09 RX ADMIN — ONDANSETRON 4 MG: 2 INJECTION INTRAMUSCULAR; INTRAVENOUS at 18:26

## 2023-03-09 RX ADMIN — DIPHENHYDRAMINE HYDROCHLORIDE 50 MG: 50 INJECTION, SOLUTION INTRAMUSCULAR; INTRAVENOUS at 04:32

## 2023-03-09 RX ADMIN — BISACODYL 10 MG: 5 TABLET, COATED ORAL at 09:37

## 2023-03-09 RX ADMIN — ONDANSETRON 4 MG: 2 INJECTION INTRAMUSCULAR; INTRAVENOUS at 09:49

## 2023-03-09 RX ADMIN — HYDROMORPHONE HYDROCHLORIDE 1 MG: 1 INJECTION, SOLUTION INTRAMUSCULAR; INTRAVENOUS; SUBCUTANEOUS at 00:15

## 2023-03-09 RX ADMIN — LORAZEPAM 1 MG: 2 INJECTION INTRAMUSCULAR; INTRAVENOUS at 06:16

## 2023-03-09 RX ADMIN — LORAZEPAM 1 MG: 2 INJECTION INTRAMUSCULAR; INTRAVENOUS at 12:17

## 2023-03-09 RX ADMIN — FAMOTIDINE 20 MG: 10 INJECTION, SOLUTION INTRAVENOUS at 09:37

## 2023-03-09 RX ADMIN — SODIUM CHLORIDE 125 ML/HR: 0.9 INJECTION, SOLUTION INTRAVENOUS at 09:49

## 2023-03-09 RX ADMIN — METHYLPREDNISOLONE SODIUM SUCCINATE 60 MG: 125 INJECTION, POWDER, FOR SOLUTION INTRAMUSCULAR; INTRAVENOUS at 09:37

## 2023-03-09 RX ADMIN — ONDANSETRON 4 MG: 2 INJECTION INTRAMUSCULAR; INTRAVENOUS at 14:05

## 2023-03-09 RX ADMIN — ONDANSETRON 4 MG: 2 INJECTION INTRAMUSCULAR; INTRAVENOUS at 04:58

## 2023-03-09 RX ADMIN — DIPHENHYDRAMINE HYDROCHLORIDE 50 MG: 50 INJECTION, SOLUTION INTRAMUSCULAR; INTRAVENOUS at 09:37

## 2023-03-09 RX ADMIN — DIPHENHYDRAMINE HYDROCHLORIDE 50 MG: 50 INJECTION, SOLUTION INTRAMUSCULAR; INTRAVENOUS at 18:25

## 2023-03-09 RX ADMIN — ONDANSETRON 4 MG: 2 INJECTION INTRAMUSCULAR; INTRAVENOUS at 00:26

## 2023-03-09 RX ADMIN — HYDROMORPHONE HYDROCHLORIDE 1 MG: 1 INJECTION, SOLUTION INTRAMUSCULAR; INTRAVENOUS; SUBCUTANEOUS at 12:17

## 2023-03-09 RX ADMIN — FAMOTIDINE 20 MG: 10 INJECTION, SOLUTION INTRAVENOUS at 20:50

## 2023-03-09 RX ADMIN — DIPHENHYDRAMINE HYDROCHLORIDE 50 MG: 50 INJECTION, SOLUTION INTRAMUSCULAR; INTRAVENOUS at 20:50

## 2023-03-09 RX ADMIN — LORAZEPAM 1 MG: 2 INJECTION INTRAMUSCULAR; INTRAVENOUS at 18:26

## 2023-03-09 RX ADMIN — METHYLPREDNISOLONE SODIUM SUCCINATE 60 MG: 125 INJECTION, POWDER, FOR SOLUTION INTRAMUSCULAR; INTRAVENOUS at 20:50

## 2023-03-09 RX ADMIN — SODIUM CHLORIDE 125 ML/HR: 0.9 INJECTION, SOLUTION INTRAVENOUS at 18:26

## 2023-03-09 RX ADMIN — NYSTATIN 500000 UNITS: 100000 SUSPENSION ORAL at 21:53

## 2023-03-09 RX ADMIN — LORAZEPAM 1 MG: 2 INJECTION INTRAMUSCULAR; INTRAVENOUS at 00:15

## 2023-03-09 RX ADMIN — SODIUM CHLORIDE 125 ML/HR: 0.9 INJECTION, SOLUTION INTRAVENOUS at 00:26

## 2023-03-09 RX ADMIN — DIPHENHYDRAMINE HYDROCHLORIDE 50 MG: 50 INJECTION, SOLUTION INTRAMUSCULAR; INTRAVENOUS at 12:17

## 2023-03-09 RX ADMIN — HYDROMORPHONE HYDROCHLORIDE 1 MG: 1 INJECTION, SOLUTION INTRAMUSCULAR; INTRAVENOUS; SUBCUTANEOUS at 18:34

## 2023-03-09 RX ADMIN — NYSTATIN 500000 UNITS: 100000 SUSPENSION ORAL at 18:27

## 2023-03-09 RX ADMIN — NYSTATIN 500000 UNITS: 100000 SUSPENSION ORAL at 12:19

## 2023-03-09 NOTE — CASE MANAGEMENT
Case Management Progress Note    Patient name Lashonda Rust  Location [facility] [id number] MRN [id number]  : 1975 Date 3/9/2023       LOS (days): 6  Geometric Mean LOS (GMLOS) (days): 3 00  Days to GMLOS:-3        OBJECTIVE:        Current admission status: Inpatient  Preferred Pharmacy:   [address] [facility] [address]  [address] [facility]  [address]  [facility] [address]  Phone: [phone] Fax: [phone]    [address] [place] [address], [place] [address] [address]  Phone: [phone] Fax: [phone]    [facility] [address] [facility], [address] [place]  [address]  Phone: [phone] Fax: [phone]    Primary Care Provider: Soraida Silva DO    Primary Insurance: [address] [facility]  Secondary Insurance: MEDICARE    PROGRESS NOTE:  Pt not ready for dc today per provider  Possibly ready for dc in the next 24 hours  Pt feels she is getting close to her baseline  Continuing Solumedrol today

## 2023-03-09 NOTE — ASSESSMENT & PLAN NOTE
· Stelara every 3 weeks    · Seen by GI this admission who ordered fecal calprotectin, not collected  · CRP normal  · Restart budesonide upon discharge  · Patient stated she saw blood in her stool overnight, can test stool if she has another bowel movement  · CBC stable

## 2023-03-09 NOTE — PROGRESS NOTES
[address]  Progress Note - Emmy Stager 1975, 46 y o  female MRN: [id number]  Unit/Bed#: 408-01 Encounter: [id number]  Primary Care Provider: Deborah Madrigal DO   Date and time admitted to hospital: 3/2/2023 10:00 PM    * 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[facility] )  Assessment & Plan  · Presentation for MCAS with hives and burning sensation in throat  · Continue IV methylprednisolone 60 mg twice daily, diphenhydramine IV fluids and famotidine as recommended by her outpatient allergist    · Patient states she is getting close to baseline; we will continue with current regimen as above  · Patient hopeful for discharge home tomorrow  · Continue Solu-Medrol today, plan for a budesonide taper on discharge hopefully in the next 24 hours   · Patient requesting IM benadryl for home for emergencies  I believe this is reasonable  Prescription sent  Crohn's disease of colon with complication ([facility] )  Assessment & Plan  · Stelara every 3 weeks  · Seen by GI this admission who ordered fecal calprotectin, not collected  · CRP normal  · Restart budesonide upon discharge  · Patient stated she saw blood in her stool overnight, can test stool if she has another bowel movement  · CBC stable    Constipation  Assessment & Plan  · Patient wants BM before discharge  · Various allergies and sensitivities  · KUB without signs of obstruction  · Provided dulcolax and relistor  · Patient had several bowel movements overnight  · She noted blood as above, can test stool if she has another bowel movement  · Monitor for any electrolyte derangements  · Ensure she can tolerate food well    Hypothyroidism (acquired)  Assessment & Plan  · Restart patients levothyroxine at time of discharge        VTE Pharmacologic Prophylaxis: VTE Score: 2 Low Risk (Score 0-2) - Encourage Ambulation  Patient Centered Rounds: I performed bedside rounds with nursing staff today    Discussions with Specialists or Other Care Team Provider: case management    Education and Discussions with Family / Patient: Patient declined call to   Total Time Spent on Date of Encounter in care of patient: 45 minutes This time was spent on one or more of the following: performing physical exam; counseling and coordination of care; obtaining or reviewing history; documenting in the medical record; reviewing/ordering tests, medications or procedures; communicating with other healthcare professionals and discussing with patient's family/caregivers  Current Length of Stay: 6 day(s)  Current Patient Status: Inpatient   Certification Statement: The patient will continue to require additional inpatient hospital stay due to diarrhea overnight, needs to tolerate diet  Discharge Plan: Anticipate discharge tomorrow to home  Code Status: Level 1 - Full Code    Subjective:   Patient reports that she had diarrhea overnight and did not sleep well  She is having abdominal pain as well    Objective:     Vitals:   Temp (24hrs), Av 8 °F (37 1 °C), Min:98 3 °F (36 8 °C), Max:99 2 °F (37 3 °C)    Temp:  [98 3 °F (36 8 °C)-99 2 °F (37 3 °C)] 99 2 °F (37 3 °C)  HR:  [69-83] 69  Resp:  [18-19] 19  BP: (116-134)/(70-90) 116/70  SpO2:  [96 %-97 %] 97 %  Body mass index is 25 64 kg/m²  Input and Output Summary (last 24 hours): Intake/Output Summary (Last 24 hours) at 3/9/2023 1410  Last data filed at 3/9/2023 1248  Gross per 24 hour   Intake 3540 ml   Output --   Net 3540 ml       Physical Exam:   Physical Exam  Vitals and nursing note reviewed  Constitutional:       General: She is not in acute distress  HENT:      Head: Normocephalic and atraumatic  Cardiovascular:      Rate and Rhythm: Normal rate and regular rhythm  Pulses: Normal pulses  Heart sounds: Normal heart sounds  Pulmonary:      Effort: Pulmonary effort is normal       Breath sounds: Normal breath sounds     Abdominal:      General: Bowel sounds are normal       Palpations: Abdomen is soft  Musculoskeletal:      Right lower leg: No edema  Left lower leg: No edema  Skin:     General: Skin is warm and dry  Neurological:      General: No focal deficit present  Mental Status: She is alert  Mental status is at baseline  Psychiatric:         Mood and Affect: Mood normal          Behavior: Behavior normal           Additional Data:     Labs:  Results from last 7 days   Lab Units 03/09/23  0458   WBC Thousand/uL 25 89*   HEMOGLOBIN g/dL 14 2   HEMATOCRIT % 42 6   PLATELETS Thousands/uL 421*   NEUTROS PCT % 92*   LYMPHS PCT % 2*   MONOS PCT % 5   EOS PCT % 0     Results from last 7 days   Lab Units 03/09/23  0458   SODIUM mmol/L 137   POTASSIUM mmol/L 3 6   CHLORIDE mmol/L 103   CO2 mmol/L 27   BUN mg/dL 17   CREATININE mg/dL 0 72   ANION GAP mmol/L 7   CALCIUM mg/dL 7 9*   ALBUMIN g/dL 3 4*   TOTAL BILIRUBIN mg/dL 0 34   ALK PHOS U/L 50   ALT U/L 23   AST U/L 25   GLUCOSE RANDOM mg/dL 152*     Results from last 7 days   Lab Units 03/03/23  1054   INR  0 94     Results from last 7 days   Lab Units 03/09/23  1317 03/09/23  0719 03/08/23  2107 03/08/23  1555 03/08/23  1131 03/08/23  0727 03/07/23  2252 03/07/23  1820 03/07/23  1118 03/07/23  0809 03/06/23  1605 03/06/23  1110   POC GLUCOSE mg/dl 97 98 158* 175* 94 99 126 185* 94 94 116 100               Lines/Drains:  Invasive Devices     Peripheral Intravenous Line  Duration           Peripheral IV 03/06/23 Dorsal (posterior); Right Hand 2 days                      Imaging: No pertinent imaging reviewed      Recent Cultures (last 7 days):         Last 24 Hours Medication List:   Current Facility-Administered Medications   Medication Dose Route Frequency Provider Last Rate   • diphenhydrAMINE  50 mg Intramuscular Q4H Claribel Mei PA-C     • famotidine  20 mg Intravenous Q12H [address] Claribel Mei PA-C     • HYDROmorphone  0 5 mg Intravenous Q6H PRN Anne Holland PA-C      Or   • HYDROmorphone  1 mg Intravenous Q6H PRN Anne Holland RANDEE     • levalbuterol  1 25 mg Nebulization Q4H PRN Flako Morales PA-C     • LORazepam  1 mg Intravenous Q6H PRN Flako Morales PA-C     • methylPREDNISolone sodium succinate  60 mg Intravenous Q12H [address] Dneise Joseph PA-C     • nystatin  500,000 Units Swish & Spit 4x Daily Chris Eldridge DO     • ondansetron  4 mg Intravenous Q4H PRN Joseph Francisco DO     • sodium chloride  125 mL/hr Intravenous Continuous Mayte Duque,  mL/hr (03/09/23 0949)        Today, Patient Was Seen By: Denise Joseph PA-C    **Please Note: This note may have been constructed using a voice recognition system  **

## 2023-03-09 NOTE — ASSESSMENT & PLAN NOTE
· Patient wants BM before discharge  · Various allergies and sensitivities  · KUB without signs of obstruction  · Provided dulcolax and relistor  · Patient had several bowel movements overnight  · She noted blood as above, can test stool if she has another bowel movement  · Monitor for any electrolyte derangements  · Ensure she can tolerate food well

## 2023-03-09 NOTE — PLAN OF CARE
Problem: DISCHARGE PLANNING - CARE MANAGEMENT  Goal: Discharge to post-acute care or home with appropriate resources  Description: INTERVENTIONS:  - Conduct assessment to determine patient/family and health care team treatment goals, and need for post-acute services based on payer coverage, community resources, and patient preferences, and barriers to discharge  - Address psychosocial, clinical, and financial barriers to discharge as identified in assessment in conjunction with the patient/family and health care team  - Arrange appropriate level of post-acute services according to patient’s   needs and preference and payer coverage in collaboration with the physician and health care team  - Communicate with and update the patient/family, physician, and health care team regarding progress on the discharge plan  - Arrange appropriate transportation to post-acute venues  Outcome: Progressing     Problem: GASTROINTESTINAL - ADULT  Goal: Minimal or absence of nausea and/or vomiting  Description: INTERVENTIONS:  - Administer IV fluids if ordered to ensure adequate hydration  - Maintain NPO status until nausea and vomiting are resolved  - Administer ordered antiemetic medications as needed  - Provide nonpharmacologic comfort measures as appropriate  - Advance diet as tolerated, as ordered  - Consider nutrition services referral to assist patient with adequate nutrition and appropriate food choices  Outcome: Progressing     Problem: METABOLIC, FLUID AND ELECTROLYTES - ADULT  Goal: Electrolytes maintained within normal limits  Description: INTERVENTIONS:  - Monitor labs and assess patient for signs and symptoms of electrolyte imbalances  - Administer electrolyte replacement as ordered  - Monitor response to electrolyte replacements, including repeat lab results as appropriate  - Instruct patient on fluid and nutrition as appropriate  Outcome: Progressing  Goal: Fluid balance maintained  Description: INTERVENTIONS:  - Monitor labs   - Monitor I/O and WT  - Instruct patient on fluid and nutrition as appropriate  - Assess for signs & symptoms of volume excess or deficit  Outcome: Progressing  Goal: Glucose maintained within target range  Description: INTERVENTIONS:  - Monitor Blood Glucose as ordered  - Assess for signs and symptoms of hyperglycemia and hypoglycemia  - Administer ordered medications to maintain glucose within target range  - Assess nutritional intake and initiate nutrition service referral as needed  Outcome: Progressing     Problem: PAIN - ADULT  Goal: Verbalizes/displays adequate comfort level or baseline comfort level  Description: Interventions:  - Encourage patient to monitor pain and request assistance  - Assess pain using appropriate pain scale (0-10 pain scale)  - Administer analgesics based on type and severity of pain and evaluate response  - Implement non-pharmacological measures as appropriate and evaluate response  - Consider cultural and social influences on pain and pain management  - Notify physician/advanced practitioner if interventions unsuccessful or patient reports new pain  Outcome: Progressing     Problem: INFECTION - ADULT  Goal: Absence or prevention of progression during hospitalization  Description: INTERVENTIONS:  - Assess and monitor for signs and symptoms of infection  - Monitor lab/diagnostic results  - Monitor all insertion sites, i e  indwelling lines  - Administer medications as ordered  - Instruct and encourage patient and family to use good hand hygiene technique  Outcome: Progressing     Problem: SAFETY ADULT  Goal: Patient will remain free of falls  Description: INTERVENTIONS:  - Educate patient/family on patient safety including physical limitations  - Instruct patient to call for assistance with activity (independent)  - Consult OT/PT to assist with strengthening/mobility   - Keep Call bell within reach  - Keep bed low and locked with side rails adjusted as appropriate  - Keep care items and personal belongings within reach  - Initiate and maintain comfort rounds  - Make Fall Risk Sign visible to staff (low fall risk)  - Offer Toileting every 1-2 Hours, in advance of need  - Obtain necessary fall risk management equipment: nonskid footwear  Outcome: Progressing  Goal: Maintain or return to baseline ADL function  Description: INTERVENTIONS:  -  Assess patient's ability to carry out ADLs; (independent)  - Assess/evaluate cause of self-care deficits   - Assess range of motion  - Assess patient's mobility; (independent)  - Assess patient's need for assistive devices and provide as appropriate  - Encourage maximum independence but intervene and supervise when necessary  - Involve family in performance of ADLs  - Assess for home care needs following discharge   - Consider OT consult to assist with ADL evaluation and planning for discharge  - Provide patient education as appropriate  Outcome: Progressing  Goal: Maintains/Returns to pre admission functional level  Description: INTERVENTIONS:  - Perform BMAT or MOVE assessment daily    - Set and communicate daily mobility goal to care team and patient/family/caregiver     - Collaborate with rehabilitation services on mobility goals if consulted  - Ambulate patient 3 times a day  - Out of bed to chair 3 times a day   - Out of bed for meals 3 times a day  - Out of bed for toileting  - Record patient progress and toleration of activity level   Outcome: Progressing     Problem: DISCHARGE PLANNING  Goal: Discharge to home or other facility with appropriate resources  Description: INTERVENTIONS:  - Identify barriers to discharge w/patient and caregiver  - Arrange for needed discharge resources and transportation as appropriate  - Identify discharge learning needs (meds, wound care, etc )  - Refer to Case Management Department for coordinating discharge planning if the patient needs post-hospital services based on physician/advanced practitioner order or complex needs related to functional status, cognitive ability, or social support system  Outcome: Progressing     Problem: Knowledge Deficit  Goal: Patient/family/caregiver demonstrates understanding of disease process, treatment plan, medications, and discharge instructions  Description: Complete learning assessment and assess knowledge base    Interventions:  - Provide teaching at level of understanding  - Provide teaching via preferred learning methods  Outcome: Progressing

## 2023-03-09 NOTE — CASE MANAGEMENT
Case Management Discharge Planning Note    Patient name Celestina Mejia  Location [facility] [id number] MRN [id number]  : 1975 Date 3/9/2023       Current Admission Date: 3/2/2023  Current Admission Diagnosis:Mast cell activation syndrome [facility])   Patient Active Problem List    Diagnosis Date Noted   • Immunosuppression due to chronic steroid use ([facility] ) 2022   • Atrial tachycardia ([facility] ) 2022   • Nephrolithiasis 2021   • Anaphylactic reaction 2021   • Intractable nausea and vomiting 2021   • Heart palpitations 2021   • Hyponatremia 2021   • Inflammatory bowel disease 2021   • Current chronic use of systemic steroids 2021   • MS (multiple sclerosis) ([facility] ) 2021   • Migraine    • Overweight (BMI 25 0-29 9) 2021   • Anorexia 10/17/2020   • Crohn's disease of colon with complication ([facility] )    • Mild protein-calorie malnutrition ([facility] ) 2019   • Constipation 09/10/2019   • Mass of left axilla 2019   • Immunosuppressed status ([facility] ) 2019   • Thrombocytosis 2019   • Proctitis 2019   • Thrush 2019   • Chronic back pain 2019   • Primary localized osteoarthrosis of ankle and foot 2018   • Fibromyalgia 2017   • CHF (congestive heart failure) ([facility] ) 2017   • Meniere's disease 2017   • Leukocytosis 09/10/2017   • Hypomagnesemia 2017   • Generalized anxiety disorder 2017   • Diarrhea 2017   • Vitamin D deficiency 2017   • Hypokalemia 2017   • Throat tightness 2017   • Abdominal pain 2017   • Headache 2017   • Disorder of synovium 2017   • Chondromalacia 2017   • Chronic idiopathic urticaria 2017   • 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[facility] ) 2016   • Hypothyroidism (acquired) 2014      LOS (days): 6  Geometric Mean LOS (GMLOS) (days): 3 00  Days to GMLOS:-2 9     OBJECTIVE:  Risk of Unplanned Readmission Score: 15 44 Current admission status: Inpatient   Preferred Pharmacy:   [address] [facility] [address]  [address] [facility]  [address]  [place] [address]  Phone: [phone] Fax: [phone]    [address], [address] [facility] [address]  Phone: [phone] Fax: [phone]    [facility] [address] [facility], [address] [place]  [address]  Phone: [phone] Fax: [phone]    Primary Care Provider: Santy Maynard DO    Primary Insurance: [address] [place] [address]  Secondary Insurance: MEDICARE    DISCHARGE DETAILS:  Pt is a probable dc home on this date with OP follow up

## 2023-03-10 VITALS
TEMPERATURE: 98.3 F | OXYGEN SATURATION: 93 % | BODY MASS INDEX: 25.67 KG/M2 | RESPIRATION RATE: 17 BRPM | SYSTOLIC BLOOD PRESSURE: 123 MMHG | HEART RATE: 82 BPM | HEIGHT: 65 IN | DIASTOLIC BLOOD PRESSURE: 80 MMHG | WEIGHT: 154.1 LBS

## 2023-03-10 LAB
GLUCOSE SERPL-MCNC: 111 MG/DL (ref 65–140)
GLUCOSE SERPL-MCNC: 116 MG/DL (ref 65–140)
GLUCOSE SERPL-MCNC: 137 MG/DL (ref 65–140)

## 2023-03-10 RX ORDER — BUDESONIDE 3 MG/1
3 CAPSULE, COATED PELLETS ORAL 3 TIMES DAILY
Qty: 135 CAPSULE | Refills: 0 | Status: SHIPPED | OUTPATIENT
Start: 2023-03-10

## 2023-03-10 RX ORDER — LEVALBUTEROL TARTRATE 45 UG/1
1-2 AEROSOL, METERED ORAL EVERY 4 HOURS PRN
Qty: 15 G | Refills: 0 | Status: SHIPPED | OUTPATIENT
Start: 2023-03-10

## 2023-03-10 RX ORDER — METHYLPREDNISOLONE SODIUM SUCCINATE 125 MG/2ML
60 INJECTION, POWDER, LYOPHILIZED, FOR SOLUTION INTRAMUSCULAR; INTRAVENOUS EVERY 12 HOURS SCHEDULED
Status: COMPLETED | OUTPATIENT
Start: 2023-03-10 | End: 2023-03-10

## 2023-03-10 RX ORDER — FAMOTIDINE 10 MG/ML
20 INJECTION, SOLUTION INTRAVENOUS EVERY 12 HOURS SCHEDULED
Status: COMPLETED | OUTPATIENT
Start: 2023-03-10 | End: 2023-03-10

## 2023-03-10 RX ADMIN — ONDANSETRON 4 MG: 2 INJECTION INTRAMUSCULAR; INTRAVENOUS at 06:34

## 2023-03-10 RX ADMIN — METHYLPREDNISOLONE SODIUM SUCCINATE 60 MG: 125 INJECTION, POWDER, FOR SOLUTION INTRAMUSCULAR; INTRAVENOUS at 16:33

## 2023-03-10 RX ADMIN — DIPHENHYDRAMINE HYDROCHLORIDE 50 MG: 50 INJECTION, SOLUTION INTRAMUSCULAR; INTRAVENOUS at 00:34

## 2023-03-10 RX ADMIN — HYDROMORPHONE HYDROCHLORIDE 1 MG: 1 INJECTION, SOLUTION INTRAMUSCULAR; INTRAVENOUS; SUBCUTANEOUS at 06:34

## 2023-03-10 RX ADMIN — METHYLPREDNISOLONE SODIUM SUCCINATE 60 MG: 125 INJECTION, POWDER, FOR SOLUTION INTRAMUSCULAR; INTRAVENOUS at 08:21

## 2023-03-10 RX ADMIN — HYDROMORPHONE HYDROCHLORIDE 1 MG: 1 INJECTION, SOLUTION INTRAMUSCULAR; INTRAVENOUS; SUBCUTANEOUS at 12:34

## 2023-03-10 RX ADMIN — NYSTATIN 500000 UNITS: 100000 SUSPENSION ORAL at 12:39

## 2023-03-10 RX ADMIN — DIPHENHYDRAMINE HYDROCHLORIDE 50 MG: 50 INJECTION, SOLUTION INTRAMUSCULAR; INTRAVENOUS at 12:33

## 2023-03-10 RX ADMIN — NYSTATIN 500000 UNITS: 100000 SUSPENSION ORAL at 08:30

## 2023-03-10 RX ADMIN — SODIUM CHLORIDE 125 ML/HR: 0.9 INJECTION, SOLUTION INTRAVENOUS at 02:51

## 2023-03-10 RX ADMIN — ONDANSETRON 4 MG: 2 INJECTION INTRAMUSCULAR; INTRAVENOUS at 00:34

## 2023-03-10 RX ADMIN — SODIUM CHLORIDE 125 ML/HR: 0.9 INJECTION, SOLUTION INTRAVENOUS at 10:46

## 2023-03-10 RX ADMIN — LORAZEPAM 1 MG: 2 INJECTION INTRAMUSCULAR; INTRAVENOUS at 12:33

## 2023-03-10 RX ADMIN — DIPHENHYDRAMINE HYDROCHLORIDE 50 MG: 50 INJECTION, SOLUTION INTRAMUSCULAR; INTRAVENOUS at 16:33

## 2023-03-10 RX ADMIN — FAMOTIDINE 20 MG: 10 INJECTION, SOLUTION INTRAVENOUS at 16:33

## 2023-03-10 RX ADMIN — HYDROMORPHONE HYDROCHLORIDE 1 MG: 1 INJECTION, SOLUTION INTRAMUSCULAR; INTRAVENOUS; SUBCUTANEOUS at 00:34

## 2023-03-10 RX ADMIN — DIPHENHYDRAMINE HYDROCHLORIDE 50 MG: 50 INJECTION, SOLUTION INTRAMUSCULAR; INTRAVENOUS at 08:21

## 2023-03-10 RX ADMIN — LORAZEPAM 1 MG: 2 INJECTION INTRAMUSCULAR; INTRAVENOUS at 06:34

## 2023-03-10 RX ADMIN — ONDANSETRON 4 MG: 2 INJECTION INTRAMUSCULAR; INTRAVENOUS at 12:33

## 2023-03-10 RX ADMIN — FAMOTIDINE 20 MG: 10 INJECTION, SOLUTION INTRAVENOUS at 08:21

## 2023-03-10 RX ADMIN — DIPHENHYDRAMINE HYDROCHLORIDE 50 MG: 50 INJECTION, SOLUTION INTRAMUSCULAR; INTRAVENOUS at 04:45

## 2023-03-10 RX ADMIN — LORAZEPAM 1 MG: 2 INJECTION INTRAMUSCULAR; INTRAVENOUS at 00:34

## 2023-03-10 NOTE — CASE MANAGEMENT
Case Management Progress Note    Patient name Mariam Lopez  Location [facility] [id number] MRN [id number]  : 1975 Date 3/10/2023       LOS (days): 7  Geometric Mean LOS (GMLOS) (days): 3 00  Days to GMLOS:-4        OBJECTIVE:        Current admission status: Inpatient  Preferred Pharmacy:   [address] [facility] [address]  [address] [facility]  [address]  [facility] [address]  Phone: [phone] Fax: [phone]    [address] [place] [address], [facility] [address]  Phone: [phone] Fax: [phone]    [facility] [address] [facility], [address] [place]  [address]  Phone: [phone] Fax: [phone]    Primary Care Provider: Lanie Castanon DO    Primary Insurance: [employer]  Secondary Insurance: MEDICARE    PROGRESS NOTE:  CM in basket messaged PCP office Dr Cecy Hoover re: a TCM appt within 7 days with her insurance [facility])

## 2023-03-10 NOTE — ASSESSMENT & PLAN NOTE
· Patient wants BM before discharge  · Various allergies and sensitivities  · KUB without signs of obstruction  · Provided dulcolax and relistor  · Patient had several bowel movements thereafter  · Tolerating diet well

## 2023-03-10 NOTE — CASE MANAGEMENT
Case Management Discharge Planning Note    Patient name Bianca Jeans  Location [facility] [id number] MRN [id number]  : 1975 Date 3/10/2023       Current Admission Date: 3/2/2023  Current Admission Diagnosis:Mast cell activation syndrome [facility])   Patient Active Problem List    Diagnosis Date Noted   • Immunosuppression due to chronic steroid use ([facility] ) 2022   • Atrial tachycardia ([facility] ) 2022   • Nephrolithiasis 2021   • Anaphylactic reaction 2021   • Intractable nausea and vomiting 2021   • Heart palpitations 2021   • Hyponatremia 2021   • Inflammatory bowel disease 2021   • Current chronic use of systemic steroids 2021   • MS (multiple sclerosis) ([facility] ) 2021   • Migraine    • Overweight (BMI 25 0-29 9) 2021   • Anorexia 10/17/2020   • Crohn's disease of colon with complication ([facility] )    • Mild protein-calorie malnutrition ([facility] ) 2019   • Constipation 09/10/2019   • Mass of left axilla 2019   • Immunosuppressed status ([facility] ) 2019   • Thrombocytosis 2019   • Proctitis 2019   • Thrush 2019   • Chronic back pain 2019   • Primary localized osteoarthrosis of ankle and foot 2018   • Fibromyalgia 2017   • CHF (congestive heart failure) ([facility] ) 2017   • Meniere's disease 2017   • Leukocytosis 09/10/2017   • Hypomagnesemia 2017   • Generalized anxiety disorder 2017   • Diarrhea 2017   • Vitamin D deficiency 2017   • Hypokalemia 2017   • Throat tightness 2017   • Abdominal pain 2017   • Headache 2017   • Disorder of synovium 2017   • Chondromalacia 2017   • Chronic idiopathic urticaria 2017   • 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[facility] ) 2016   • Hypothyroidism (acquired) 2014      LOS (days): 7  Geometric Mean LOS (GMLOS) (days): 3 00  Days to GMLOS:-3 9     OBJECTIVE:  Risk of Unplanned Readmission Score: 15 5 Current admission status: Inpatient   Preferred Pharmacy:   [address] [facility] [address]  [address] [facility]  [address]  [place] [address]  Phone: [phone] Fax: [phone]    [address], [address] [place] [address]  Phone: [phone] Fax: [phone]    [facility] [address] [facility], [address] [place]  [address]  Phone: [phone] Fax: [phone]    Primary Care Provider: Daneil Spurling, DO    Primary Insurance: [address] [place] [address]  Secondary Insurance: MEDICARE    DISCHARGE DETAILS:  Pt is a planned dc home today after 4:30pm  Plans are home on dc with OP follow up  Spouse to transport pt home                                                                                         IMM Given (Date):: 03/10/23  IMM Given to[de-identified] Patient

## 2023-03-10 NOTE — DISCHARGE SUMMARY
[address] [facility] [address] [place]  Discharge- Andria Miller 1975, 46 y o  female MRN: [id number]  Unit/Bed#: 525-79 Encounter: [id number]  Primary Care Provider: Celia Gloria DO   Date and time admitted to hospital: 3/2/2023 10:00 PM    * 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[facility] )  Assessment & Plan  · Presentation for MCAS with hives and burning sensation in throat  · Received IV solumedrol, benadryl and pepcid while inpatient as per patient's immunologist  · Will discharge on budesonide taper - recommended for 90 mg for 4-6 weeks then GI will determine a taper plan  · Patient requesting IM benadryl for home for emergencies  I believe this is reasonable  Prescription sent  Patient planning eval with [address] [place] [address],[address]    Crohn's disease of colon with complication ([facility] 75 )  Assessment & Plan  · Stelara every 3 weeks    · Seen by GI this admission who ordered fecal calprotectin, not collected  · CRP normal  · Restart budesonide upon discharge  · Good bowel movements prior to discharge  · Will follow up with Dr Sergei Montes De Oca  · Patient wants BM before discharge  · Various allergies and sensitivities  · KUB without signs of obstruction  · Provided dulcolax and relistor  · Patient had several bowel movements thereafter  · Tolerating diet well    Hypothyroidism (acquired)  Assessment & Plan  · Restart levothyroxine at time of discharge  · Follow up with PCP       Medical Problems     Resolved Problems  Date Reviewed: 3/6/2023   None       Discharging Physician / Practitioner: Breanne Roach PA-C  PCP: Celia Gloria DO  Admission Date:   Admission Orders (From admission, onward)     Ordered        03/03/23 1542  Inpatient Admission  Once            03/03/23 0101  Place in Observation  Once                      Discharge Date: 03/10/23    Consultations During Hospital Stay:  · GI    Procedures Performed:   · none    Significant Findings / Test Results:     XR abdomen 1 view kub   Final Result      Increased stool within the colon, distal ascending colonic diameter about 7 2 cm      No evidence of small bowel obstruction                Workstation performed: [id number]               Incidental Findings:   · none    Test Results Pending at Discharge (will require follow up):   · none     Outpatient Tests Requested:  · none    Complications:  none    Reason for Admission: MCAS flare    Hospital Course:   Russ Doan is a 46 y o  female patient who originally presented to the hospital on 3/2/2023 due to MCAS flare  presented to [facility] ER on the evening of 3/2 with symptoms of erythema and itching of her anterior chest wall, burning sensation in her throat as well as occasional vomiting and diarrhea  She reports that these are the symptoms she gets when her mast cell activation syndrome flares up  She further reports that she recently had a change to her Stelara injection and since then her symptoms have been worsening  She further additionally reports that she thinks she inhaled birdseed recently and that the bird seed typically makes this worse  She reports that she requires hospitalization for this with epinephrine, IV Benadryl, Solu-Medrol, Pepcid and fluids  She received 7 days of therapy and has remained stable  She did develop constipation and responded well to dulcolax and relistor  GI did see the patient and has recommended budesonide taper on discharge  Otherwise, no procedures recommended  She plans to get eval with [facility] soon  Please see above list of diagnoses and related plan for additional information  Condition at Discharge: stable    Discharge Day Visit / Exam:   Subjective:  Patient reports she slept well last night  Eating well  No further diarrhea episodes   No signs of hives or allergic reaction  Vitals: Blood Pressure: 123/80 (03/10/23 1505)  Pulse: 82 (03/10/23 1505)  Temperature: 98 3 °F (36 8 °C) (03/10/23 1505)  Temp Source: Temporal (03/10/23 1505)  Respirations: 17 (03/10/23 1505)  Height: 5' 5" (165 1 cm) (03/03/23 0354)  Weight - Scale: 69 9 kg (154 lb 1 6 oz) (03/03/23 0354)  SpO2: 93 % (03/10/23 1505)  Exam:   Physical Exam  Vitals and nursing note reviewed  Constitutional:       General: She is not in acute distress  Appearance: She is not ill-appearing  HENT:      Head: Normocephalic and atraumatic  Cardiovascular:      Rate and Rhythm: Normal rate and regular rhythm  Pulses: Normal pulses  Heart sounds: Normal heart sounds  No murmur heard  No gallop  Pulmonary:      Effort: Pulmonary effort is normal       Breath sounds: Normal breath sounds  No wheezing, rhonchi or rales  Abdominal:      General: Bowel sounds are normal       Palpations: Abdomen is soft  Musculoskeletal:      Right lower leg: No edema  Left lower leg: No edema  Skin:     General: Skin is warm and dry  Neurological:      General: No focal deficit present  Mental Status: She is alert  Mental status is at baseline  Psychiatric:         Mood and Affect: Mood normal          Behavior: Behavior normal           Discussion with Family: Patient declined call to   Discharge instructions/Information to patient and family:   See after visit summary for information provided to patient and family  Provisions for Follow-Up Care:  See after visit summary for information related to follow-up care and any pertinent home health orders  Disposition:   Home    Planned Readmission: none     Discharge Statement:  I spent 70 minutes discharging the patient  This time was spent on the day of discharge  I had direct contact with the patient on the day of discharge  Greater than 50% of the total time was spent examining patient, answering all patient questions, arranging and discussing plan of care with patient as well as directly providing post-discharge instructions    Additional time then spent on discharge activities  Discharge Medications:  See after visit summary for reconciled discharge medications provided to patient and/or family        **Please Note: This note may have been constructed using a voice recognition system**

## 2023-03-10 NOTE — ASSESSMENT & PLAN NOTE
· Presentation for MCAS with hives and burning sensation in throat  · Received IV solumedrol, benadryl and pepcid while inpatient as per patient's immunologist  · Will discharge on budesonide taper - recommended for 90 mg for 4-6 weeks then GI will determine a taper plan  · Patient requesting IM benadryl for home for emergencies  I believe this is reasonable  Prescription sent       Patient planning eval with [facility]

## 2023-03-10 NOTE — ASSESSMENT & PLAN NOTE
· Stelara every 3 weeks    · Seen by GI this admission who ordered fecal calprotectin, not collected  · CRP normal  · Restart budesonide upon discharge  · Good bowel movements prior to discharge  · Will follow up with Dr Mikael Valentine Passed

## 2023-03-13 NOTE — UTILIZATION REVIEW
NOTIFICATION OF ADMISSION DISCHARGE   This is a Notification of Discharge from [address]  Please be advised that this patient has been discharge from our facility  Below you will find the admission and discharge date and time including the patient’s disposition  UTILIZATION REVIEW CONTACT:  Stacey Ramirez  Utilization   Network Utilization Review Department  Phone: [phone] x carefully listen to the prompts  All voicemails are confidential   Email: [email or web address]     ADMISSION INFORMATION  PRESENTATION DATE: 3/2/2023 10:00 PM  OBERVATION ADMISSION DATE:   INPATIENT ADMISSION DATE: 3/3/23  3:42 PM   DISCHARGE DATE: 3/10/2023  5:55 PM   DISPOSITION:Home/Self Care    IMPORTANT INFORMATION:  Send all requests for admission clinical reviews, approved or denied determinations and any other requests to dedicated fax number below belonging to the campus where the patient is receiving treatment   List of dedicated fax numbers:  [address] DENIALS (Administrative/Medical Necessity) [phone]   [address] (Maternity/NICU/Pediatrics) [phone]   [facility] [phone]   [place] [address] [phone]   [address] [phone]   [address] [facility] [phone] 90 [facility] [phone]   [address] [place], [place] [address] [phone]   [facility]  [phone]   [facility] [phone]   412 [facility] [address] [facility] [phone]

## 2023-03-15 DIAGNOSIS — E44.1 MILD PROTEIN-CALORIE MALNUTRITION (HCC): ICD-10-CM

## 2023-03-15 DIAGNOSIS — R63.0 ANOREXIA: Primary | ICD-10-CM

## 2023-03-15 DIAGNOSIS — R19.7 DIARRHEA, UNSPECIFIED TYPE: ICD-10-CM

## 2023-03-15 RX ORDER — METHYLPREDNISOLONE SODIUM SUCCINATE 125 MG/2ML
60 INJECTION, POWDER, LYOPHILIZED, FOR SOLUTION INTRAMUSCULAR; INTRAVENOUS ONCE
Status: CANCELLED
Start: 2023-03-16 | End: 2023-03-16

## 2023-03-16 ENCOUNTER — TELEPHONE (OUTPATIENT)
Dept: INFUSION CENTER | Facility: HOSPITAL | Age: 48
End: 2023-03-16

## 2023-03-16 NOTE — TELEPHONE ENCOUNTER
Patient cancelled appointment on: no call/no show; called patient, but mailbox full      Time call received: no call    Telephone cancellation encounter routed to provider: yes    Patient marked no show on SnapBoard: yes    Appointment unlinked: yes    Appointment rescheduled: no

## 2023-03-17 ENCOUNTER — TELEPHONE (OUTPATIENT)
Dept: INFUSION CENTER | Facility: HOSPITAL | Age: 48
End: 2023-03-17

## 2023-03-17 ENCOUNTER — TRANSITIONAL CARE MANAGEMENT (OUTPATIENT)
Dept: FAMILY MEDICINE CLINIC | Facility: CLINIC | Age: 48
End: 2023-03-17

## 2023-03-17 NOTE — TELEPHONE ENCOUNTER
Fiona [phone] to inform her we have an opening at [facility] today   Mailbox is full and I was unable to leave a message

## 2023-03-21 ENCOUNTER — HOSPITAL ENCOUNTER (OUTPATIENT)
Dept: INFUSION CENTER | Facility: HOSPITAL | Age: 48
Discharge: HOME | End: 2023-03-21
Attending: INTERNAL MEDICINE

## 2023-03-21 VITALS
RESPIRATION RATE: 18 BRPM | TEMPERATURE: 97.5 F | SYSTOLIC BLOOD PRESSURE: 122 MMHG | DIASTOLIC BLOOD PRESSURE: 86 MMHG | HEART RATE: 111 BPM | OXYGEN SATURATION: 100 %

## 2023-03-21 DIAGNOSIS — R63.0 ANOREXIA: Primary | ICD-10-CM

## 2023-03-21 DIAGNOSIS — R19.7 DIARRHEA, UNSPECIFIED TYPE: ICD-10-CM

## 2023-03-21 DIAGNOSIS — E44.1 MILD PROTEIN-CALORIE MALNUTRITION (HCC): ICD-10-CM

## 2023-03-21 RX ORDER — METHYLPREDNISOLONE SODIUM SUCCINATE 125 MG/2ML
60 INJECTION, POWDER, LYOPHILIZED, FOR SOLUTION INTRAMUSCULAR; INTRAVENOUS ONCE
Status: CANCELLED
Start: 2023-03-22 | End: 2023-03-22

## 2023-03-21 RX ORDER — METHYLPREDNISOLONE SODIUM SUCCINATE 125 MG/2ML
60 INJECTION, POWDER, LYOPHILIZED, FOR SOLUTION INTRAMUSCULAR; INTRAVENOUS ONCE
Status: COMPLETED | OUTPATIENT
Start: 2023-03-21 | End: 2023-03-21

## 2023-03-21 RX ADMIN — METHYLPREDNISOLONE SODIUM SUCCINATE 60 MG: 125 INJECTION, POWDER, FOR SOLUTION INTRAMUSCULAR; INTRAVENOUS at 14:06

## 2023-03-21 RX ADMIN — SODIUM CHLORIDE 1000 ML: 0.9 INJECTION, SOLUTION INTRAVENOUS at 14:04

## 2023-03-21 RX ADMIN — DIPHENHYDRAMINE HYDROCHLORIDE 50 MG: 50 INJECTION, SOLUTION INTRAMUSCULAR; INTRAVENOUS at 14:14

## 2023-03-21 NOTE — PROGRESS NOTES
Pt tolerated 1L NSS hydration, solumedrol, and benadryl without difficulty  AVS declined  Left ambulatory in stable condition

## 2023-03-22 ENCOUNTER — HOSPITAL ENCOUNTER (OUTPATIENT)
Dept: INFUSION CENTER | Facility: HOSPITAL | Age: 48
Discharge: HOME | End: 2023-03-22
Attending: INTERNAL MEDICINE

## 2023-03-22 VITALS
DIASTOLIC BLOOD PRESSURE: 84 MMHG | SYSTOLIC BLOOD PRESSURE: 131 MMHG | RESPIRATION RATE: 18 BRPM | TEMPERATURE: 98.3 F | HEART RATE: 99 BPM

## 2023-03-22 DIAGNOSIS — R63.0 ANOREXIA: Primary | ICD-10-CM

## 2023-03-22 DIAGNOSIS — E44.1 MILD PROTEIN-CALORIE MALNUTRITION (HCC): ICD-10-CM

## 2023-03-22 DIAGNOSIS — R19.7 DIARRHEA, UNSPECIFIED TYPE: ICD-10-CM

## 2023-03-22 RX ORDER — METHYLPREDNISOLONE SODIUM SUCCINATE 125 MG/2ML
60 INJECTION, POWDER, LYOPHILIZED, FOR SOLUTION INTRAMUSCULAR; INTRAVENOUS ONCE
Status: CANCELLED
Start: 2023-03-23 | End: 2023-03-23

## 2023-03-22 RX ORDER — METHYLPREDNISOLONE SODIUM SUCCINATE 125 MG/2ML
60 INJECTION, POWDER, LYOPHILIZED, FOR SOLUTION INTRAMUSCULAR; INTRAVENOUS ONCE
Status: COMPLETED | OUTPATIENT
Start: 2023-03-22 | End: 2023-03-22

## 2023-03-22 RX ADMIN — DIPHENHYDRAMINE HYDROCHLORIDE 50 MG: 50 INJECTION, SOLUTION INTRAMUSCULAR; INTRAVENOUS at 14:04

## 2023-03-22 RX ADMIN — SODIUM CHLORIDE 1000 ML: 0.9 INJECTION, SOLUTION INTRAVENOUS at 13:50

## 2023-03-22 RX ADMIN — METHYLPREDNISOLONE SODIUM SUCCINATE 60 MG: 125 INJECTION, POWDER, FOR SOLUTION INTRAMUSCULAR; INTRAVENOUS at 13:58

## 2023-03-23 ENCOUNTER — HOSPITAL ENCOUNTER (OUTPATIENT)
Dept: INFUSION CENTER | Facility: HOSPITAL | Age: 48
Discharge: HOME | End: 2023-03-23
Attending: INTERNAL MEDICINE

## 2023-03-23 VITALS
DIASTOLIC BLOOD PRESSURE: 69 MMHG | HEART RATE: 101 BPM | RESPIRATION RATE: 18 BRPM | TEMPERATURE: 98.4 F | OXYGEN SATURATION: 100 % | SYSTOLIC BLOOD PRESSURE: 127 MMHG

## 2023-03-23 DIAGNOSIS — E44.1 MILD PROTEIN-CALORIE MALNUTRITION (HCC): ICD-10-CM

## 2023-03-23 DIAGNOSIS — R19.7 DIARRHEA, UNSPECIFIED TYPE: ICD-10-CM

## 2023-03-23 DIAGNOSIS — R63.0 ANOREXIA: Primary | ICD-10-CM

## 2023-03-23 RX ORDER — METHYLPREDNISOLONE SODIUM SUCCINATE 125 MG/2ML
60 INJECTION, POWDER, LYOPHILIZED, FOR SOLUTION INTRAMUSCULAR; INTRAVENOUS ONCE
Status: COMPLETED | OUTPATIENT
Start: 2023-03-23 | End: 2023-03-23

## 2023-03-23 RX ORDER — METHYLPREDNISOLONE SODIUM SUCCINATE 125 MG/2ML
60 INJECTION, POWDER, LYOPHILIZED, FOR SOLUTION INTRAMUSCULAR; INTRAVENOUS ONCE
Status: CANCELLED
Start: 2023-03-24 | End: 2023-03-24

## 2023-03-23 RX ADMIN — DIPHENHYDRAMINE HYDROCHLORIDE 50 MG: 50 INJECTION, SOLUTION INTRAMUSCULAR; INTRAVENOUS at 14:34

## 2023-03-23 RX ADMIN — METHYLPREDNISOLONE SODIUM SUCCINATE 60 MG: 125 INJECTION, POWDER, FOR SOLUTION INTRAMUSCULAR; INTRAVENOUS at 14:26

## 2023-03-23 RX ADMIN — SODIUM CHLORIDE 1000 ML: 0.9 INJECTION, SOLUTION INTRAVENOUS at 12:40

## 2023-03-24 ENCOUNTER — TELEPHONE (OUTPATIENT)
Dept: OTHER | Facility: OTHER | Age: 48
End: 2023-03-24

## 2023-03-24 ENCOUNTER — HOSPITAL ENCOUNTER (OUTPATIENT)
Dept: INFUSION CENTER | Facility: HOSPITAL | Age: 48
End: 2023-03-24
Attending: INTERNAL MEDICINE

## 2023-03-24 VITALS
SYSTOLIC BLOOD PRESSURE: 146 MMHG | DIASTOLIC BLOOD PRESSURE: 82 MMHG | TEMPERATURE: 98 F | RESPIRATION RATE: 17 BRPM | HEART RATE: 95 BPM | OXYGEN SATURATION: 98 %

## 2023-03-24 DIAGNOSIS — E44.1 MILD PROTEIN-CALORIE MALNUTRITION (HCC): ICD-10-CM

## 2023-03-24 DIAGNOSIS — R63.0 ANOREXIA: Primary | ICD-10-CM

## 2023-03-24 DIAGNOSIS — R19.7 DIARRHEA, UNSPECIFIED TYPE: ICD-10-CM

## 2023-03-24 RX ORDER — METHYLPREDNISOLONE SODIUM SUCCINATE 125 MG/2ML
60 INJECTION, POWDER, LYOPHILIZED, FOR SOLUTION INTRAMUSCULAR; INTRAVENOUS ONCE
Status: COMPLETED | OUTPATIENT
Start: 2023-03-24 | End: 2023-03-24

## 2023-03-24 RX ORDER — METHYLPREDNISOLONE SODIUM SUCCINATE 125 MG/2ML
60 INJECTION, POWDER, LYOPHILIZED, FOR SOLUTION INTRAMUSCULAR; INTRAVENOUS ONCE
Status: CANCELLED
Start: 2023-03-27 | End: 2023-03-25

## 2023-03-24 RX ADMIN — DIPHENHYDRAMINE HYDROCHLORIDE 50 MG: 50 INJECTION, SOLUTION INTRAMUSCULAR; INTRAVENOUS at 10:24

## 2023-03-24 RX ADMIN — SODIUM CHLORIDE 1000 ML: 0.9 INJECTION, SOLUTION INTRAVENOUS at 09:25

## 2023-03-24 RX ADMIN — METHYLPREDNISOLONE SODIUM SUCCINATE 60 MG: 125 INJECTION, POWDER, FOR SOLUTION INTRAMUSCULAR; INTRAVENOUS at 10:16

## 2023-03-27 ENCOUNTER — HOSPITAL ENCOUNTER (OUTPATIENT)
Dept: INFUSION CENTER | Facility: HOSPITAL | Age: 48
Discharge: HOME | End: 2023-03-27

## 2023-03-27 ENCOUNTER — TELEPHONE (OUTPATIENT)
Dept: FAMILY MEDICINE CLINIC | Facility: CLINIC | Age: 48
End: 2023-03-27

## 2023-03-27 VITALS
SYSTOLIC BLOOD PRESSURE: 110 MMHG | TEMPERATURE: 98.9 F | DIASTOLIC BLOOD PRESSURE: 77 MMHG | HEART RATE: 115 BPM | RESPIRATION RATE: 17 BRPM | OXYGEN SATURATION: 98 %

## 2023-03-27 DIAGNOSIS — R19.7 DIARRHEA, UNSPECIFIED TYPE: ICD-10-CM

## 2023-03-27 DIAGNOSIS — L50.1 CHRONIC IDIOPATHIC URTICARIA: Primary | ICD-10-CM

## 2023-03-27 DIAGNOSIS — R63.0 ANOREXIA: ICD-10-CM

## 2023-03-27 DIAGNOSIS — E44.1 MILD PROTEIN-CALORIE MALNUTRITION (HCC): ICD-10-CM

## 2023-03-27 RX ORDER — EPINEPHRINE 1 MG/ML
0.3 INJECTION, SOLUTION, CONCENTRATE INTRAVENOUS
Status: DISCONTINUED | OUTPATIENT
Start: 2023-03-27 | End: 2023-03-30 | Stop reason: HOSPADM

## 2023-03-27 RX ORDER — METHYLPREDNISOLONE SODIUM SUCCINATE 125 MG/2ML
60 INJECTION, POWDER, LYOPHILIZED, FOR SOLUTION INTRAMUSCULAR; INTRAVENOUS ONCE
Status: COMPLETED | OUTPATIENT
Start: 2023-03-27 | End: 2023-03-27

## 2023-03-27 RX ORDER — EPINEPHRINE 1 MG/ML
0.3 INJECTION, SOLUTION, CONCENTRATE INTRAVENOUS
Status: CANCELLED | OUTPATIENT
Start: 2023-03-27

## 2023-03-27 RX ORDER — METHYLPREDNISOLONE SODIUM SUCCINATE 125 MG/2ML
60 INJECTION, POWDER, LYOPHILIZED, FOR SOLUTION INTRAMUSCULAR; INTRAVENOUS ONCE
Status: CANCELLED
Start: 2023-03-28 | End: 2023-03-28

## 2023-03-27 RX ORDER — EPINEPHRINE 1 MG/ML
0.3 INJECTION, SOLUTION, CONCENTRATE INTRAVENOUS
OUTPATIENT
Start: 2023-04-17

## 2023-03-27 RX ADMIN — DIPHENHYDRAMINE HYDROCHLORIDE 50 MG: 50 INJECTION, SOLUTION INTRAMUSCULAR; INTRAVENOUS at 11:50

## 2023-03-27 RX ADMIN — OMALIZUMAB 300 MG: 150 INJECTION, SOLUTION SUBCUTANEOUS at 13:56

## 2023-03-27 RX ADMIN — METHYLPREDNISOLONE SODIUM SUCCINATE 60 MG: 125 INJECTION, POWDER, FOR SOLUTION INTRAMUSCULAR; INTRAVENOUS at 11:27

## 2023-03-27 RX ADMIN — SODIUM CHLORIDE 1000 ML: 0.9 INJECTION, SOLUTION INTRAVENOUS at 11:04

## 2023-03-27 NOTE — TELEPHONE ENCOUNTER
Patient called looking to make an appt for a Hospital F/U  She was in [facility] from 3/2-3/10  A lot of GI Problems along with fevers, chills and vomiting  She reports that she thought she was suppose to have stool studies done but got D/C before specimens were given  She is interested in having these done, if you would order them for her  She is still not feeling good  Has one or two good days then she starts all over with chills,vomiting, diarrhea and low grade fevers  Her normal temp is like 97 2 so when her temp is 99 8-100 3 she is feeling pretty rotten  I made her an appt to come into this office for 5/1/23( that was the soonest) she is scheduled for a colonoscopy for 5/2/23  She is asking if you could review her chart, see if you think she should do any of the stool studies or get any blood work done  Is there any where in your schedule that you think you can see her before 5/1/23 ?

## 2023-03-28 NOTE — TELEPHONE ENCOUNTER
She has a history of Crohns and is well-established with gastroenterology  I would recommend that she reach out to her gastroenterologist for recommendations  I am not really sure what we would be looking for or what exactly she is concerned about

## 2023-03-29 DIAGNOSIS — R19.7 DIARRHEA, UNSPECIFIED TYPE: Primary | ICD-10-CM

## 2023-03-31 ENCOUNTER — HOSPITAL ENCOUNTER (OUTPATIENT)
Dept: INFUSION CENTER | Facility: HOSPITAL | Age: 48
End: 2023-03-31
Attending: INTERNAL MEDICINE

## 2023-03-31 VITALS
OXYGEN SATURATION: 99 % | HEART RATE: 86 BPM | TEMPERATURE: 98 F | RESPIRATION RATE: 20 BRPM | DIASTOLIC BLOOD PRESSURE: 99 MMHG | SYSTOLIC BLOOD PRESSURE: 164 MMHG

## 2023-03-31 DIAGNOSIS — E44.1 MILD PROTEIN-CALORIE MALNUTRITION (HCC): ICD-10-CM

## 2023-03-31 DIAGNOSIS — R63.0 ANOREXIA: Primary | ICD-10-CM

## 2023-03-31 DIAGNOSIS — R19.7 DIARRHEA, UNSPECIFIED TYPE: ICD-10-CM

## 2023-03-31 RX ORDER — METHYLPREDNISOLONE SODIUM SUCCINATE 125 MG/2ML
60 INJECTION, POWDER, LYOPHILIZED, FOR SOLUTION INTRAMUSCULAR; INTRAVENOUS ONCE
Status: CANCELLED
Start: 2023-04-03 | End: 2023-04-01

## 2023-03-31 RX ORDER — METHYLPREDNISOLONE SODIUM SUCCINATE 125 MG/2ML
60 INJECTION, POWDER, LYOPHILIZED, FOR SOLUTION INTRAMUSCULAR; INTRAVENOUS ONCE
Status: COMPLETED | OUTPATIENT
Start: 2023-03-31 | End: 2023-03-31

## 2023-03-31 RX ADMIN — DIPHENHYDRAMINE HYDROCHLORIDE 50 MG: 50 INJECTION, SOLUTION INTRAMUSCULAR; INTRAVENOUS at 12:49

## 2023-03-31 RX ADMIN — SODIUM CHLORIDE 1000 ML: 0.9 INJECTION, SOLUTION INTRAVENOUS at 13:23

## 2023-03-31 RX ADMIN — METHYLPREDNISOLONE SODIUM SUCCINATE 60 MG: 125 INJECTION, POWDER, FOR SOLUTION INTRAMUSCULAR; INTRAVENOUS at 12:35

## 2023-04-03 ENCOUNTER — HOSPITAL ENCOUNTER (OUTPATIENT)
Dept: INFUSION CENTER | Facility: HOSPITAL | Age: 48
Discharge: HOME | End: 2023-04-03

## 2023-04-03 ENCOUNTER — DOCTOR'S OFFICE (OUTPATIENT)
Dept: URBAN - NONMETROPOLITAN AREA CLINIC 1 | Facility: CLINIC | Age: 48
Setting detail: OPHTHALMOLOGY
End: 2023-04-03
Payer: COMMERCIAL

## 2023-04-03 VITALS
TEMPERATURE: 98.8 F | OXYGEN SATURATION: 95 % | RESPIRATION RATE: 17 BRPM | SYSTOLIC BLOOD PRESSURE: 164 MMHG | HEART RATE: 113 BPM | DIASTOLIC BLOOD PRESSURE: 75 MMHG

## 2023-04-03 DIAGNOSIS — R63.0 ANOREXIA: Primary | ICD-10-CM

## 2023-04-03 DIAGNOSIS — H43.813: ICD-10-CM

## 2023-04-03 DIAGNOSIS — R19.7 DIARRHEA, UNSPECIFIED TYPE: ICD-10-CM

## 2023-04-03 DIAGNOSIS — E44.1 MILD PROTEIN-CALORIE MALNUTRITION (HCC): ICD-10-CM

## 2023-04-03 DIAGNOSIS — H25.13: ICD-10-CM

## 2023-04-03 DIAGNOSIS — H40.003: ICD-10-CM

## 2023-04-03 PROCEDURE — 92134 CPTRZ OPH DX IMG PST SGM RTA: CPT | Performed by: OPHTHALMOLOGY

## 2023-04-03 PROCEDURE — 99213 OFFICE O/P EST LOW 20 MIN: CPT | Performed by: OPHTHALMOLOGY

## 2023-04-03 RX ORDER — METHYLPREDNISOLONE SODIUM SUCCINATE 125 MG/2ML
60 INJECTION, POWDER, LYOPHILIZED, FOR SOLUTION INTRAMUSCULAR; INTRAVENOUS ONCE
Status: CANCELLED
Start: 2023-04-07 | End: 2023-04-04

## 2023-04-03 RX ADMIN — SODIUM CHLORIDE 1000 ML: 0.9 INJECTION, SOLUTION INTRAVENOUS at 11:50

## 2023-04-03 ASSESSMENT — VISUAL ACUITY
OS_BCVA: 20/20
OD_BCVA: 20/20

## 2023-04-03 ASSESSMENT — CONFRONTATIONAL VISUAL FIELD TEST (CVF)
OD_FINDINGS: FULL
OS_FINDINGS: FULL

## 2023-04-04 ENCOUNTER — HOSPITAL ENCOUNTER (OUTPATIENT)
Dept: INFUSION CENTER | Facility: HOSPITAL | Age: 48
End: 2023-04-04
Attending: INTERNAL MEDICINE

## 2023-04-07 ENCOUNTER — HOSPITAL ENCOUNTER (OUTPATIENT)
Dept: INFUSION CENTER | Facility: HOSPITAL | Age: 48
End: 2023-04-07
Attending: INTERNAL MEDICINE

## 2023-04-07 VITALS — TEMPERATURE: 97.8 F

## 2023-04-07 DIAGNOSIS — R19.7 DIARRHEA, UNSPECIFIED TYPE: ICD-10-CM

## 2023-04-07 DIAGNOSIS — E44.1 MILD PROTEIN-CALORIE MALNUTRITION (HCC): ICD-10-CM

## 2023-04-07 DIAGNOSIS — R63.0 ANOREXIA: Primary | ICD-10-CM

## 2023-04-07 RX ORDER — METHYLPREDNISOLONE SODIUM SUCCINATE 125 MG/2ML
60 INJECTION, POWDER, LYOPHILIZED, FOR SOLUTION INTRAMUSCULAR; INTRAVENOUS ONCE
Status: COMPLETED | OUTPATIENT
Start: 2023-04-07 | End: 2023-04-07

## 2023-04-07 RX ORDER — METHYLPREDNISOLONE SODIUM SUCCINATE 125 MG/2ML
60 INJECTION, POWDER, LYOPHILIZED, FOR SOLUTION INTRAMUSCULAR; INTRAVENOUS ONCE
Start: 2023-04-08 | End: 2023-04-08

## 2023-04-07 RX ADMIN — METHYLPREDNISOLONE SODIUM SUCCINATE 60 MG: 125 INJECTION, POWDER, FOR SOLUTION INTRAMUSCULAR; INTRAVENOUS at 14:33

## 2023-04-07 RX ADMIN — DIPHENHYDRAMINE HYDROCHLORIDE 50 MG: 50 INJECTION, SOLUTION INTRAMUSCULAR; INTRAVENOUS at 14:41

## 2023-04-07 RX ADMIN — SODIUM CHLORIDE 1000 ML: 0.9 INJECTION, SOLUTION INTRAVENOUS at 14:29

## 2023-04-18 ENCOUNTER — HOSPITAL ENCOUNTER (OUTPATIENT)
Dept: INFUSION CENTER | Facility: HOSPITAL | Age: 48
Discharge: HOME | End: 2023-04-18
Attending: INTERNAL MEDICINE

## 2023-04-18 VITALS
DIASTOLIC BLOOD PRESSURE: 73 MMHG | SYSTOLIC BLOOD PRESSURE: 117 MMHG | RESPIRATION RATE: 17 BRPM | HEART RATE: 92 BPM | TEMPERATURE: 97.3 F | OXYGEN SATURATION: 98 %

## 2023-04-18 DIAGNOSIS — R19.7 DIARRHEA, UNSPECIFIED TYPE: ICD-10-CM

## 2023-04-18 DIAGNOSIS — E44.1 MILD PROTEIN-CALORIE MALNUTRITION (HCC): ICD-10-CM

## 2023-04-18 DIAGNOSIS — R63.0 ANOREXIA: Primary | ICD-10-CM

## 2023-04-18 RX ORDER — METHYLPREDNISOLONE SODIUM SUCCINATE 125 MG/2ML
60 INJECTION, POWDER, LYOPHILIZED, FOR SOLUTION INTRAMUSCULAR; INTRAVENOUS ONCE
Status: CANCELLED
Start: 2023-04-19 | End: 2023-04-19

## 2023-04-18 RX ORDER — METHYLPREDNISOLONE SODIUM SUCCINATE 125 MG/2ML
60 INJECTION, POWDER, LYOPHILIZED, FOR SOLUTION INTRAMUSCULAR; INTRAVENOUS ONCE
Status: COMPLETED | OUTPATIENT
Start: 2023-04-18 | End: 2023-04-18

## 2023-04-18 RX ADMIN — DIPHENHYDRAMINE HYDROCHLORIDE 50 MG: 50 INJECTION, SOLUTION INTRAMUSCULAR; INTRAVENOUS at 09:10

## 2023-04-18 RX ADMIN — METHYLPREDNISOLONE SODIUM SUCCINATE 60 MG: 125 INJECTION, POWDER, FOR SOLUTION INTRAMUSCULAR; INTRAVENOUS at 11:20

## 2023-04-18 RX ADMIN — SODIUM CHLORIDE 1000 ML: 0.9 INJECTION, SOLUTION INTRAVENOUS at 08:35

## 2023-04-19 ENCOUNTER — HOSPITAL ENCOUNTER (OUTPATIENT)
Dept: INFUSION CENTER | Facility: HOSPITAL | Age: 48
Discharge: HOME | End: 2023-04-19
Attending: INTERNAL MEDICINE

## 2023-04-19 ENCOUNTER — APPOINTMENT (OUTPATIENT)
Dept: LAB | Facility: HOSPITAL | Age: 48
End: 2023-04-19
Attending: INTERNAL MEDICINE

## 2023-04-19 VITALS
TEMPERATURE: 97.7 F | DIASTOLIC BLOOD PRESSURE: 75 MMHG | HEART RATE: 91 BPM | RESPIRATION RATE: 17 BRPM | OXYGEN SATURATION: 100 % | SYSTOLIC BLOOD PRESSURE: 125 MMHG

## 2023-04-19 DIAGNOSIS — D84.9 IMMUNOSUPPRESSED STATUS (HCC): ICD-10-CM

## 2023-04-19 DIAGNOSIS — K50.119 CROHN'S DISEASE OF COLON WITH COMPLICATION (HCC): ICD-10-CM

## 2023-04-19 DIAGNOSIS — R19.7 DIARRHEA, UNSPECIFIED TYPE: ICD-10-CM

## 2023-04-19 DIAGNOSIS — R53.82 CHRONIC FATIGUE: ICD-10-CM

## 2023-04-19 DIAGNOSIS — E44.1 MILD PROTEIN-CALORIE MALNUTRITION (HCC): ICD-10-CM

## 2023-04-19 DIAGNOSIS — E55.9 VITAMIN D DEFICIENCY: ICD-10-CM

## 2023-04-19 DIAGNOSIS — K52.9 INFLAMMATORY BOWEL DISEASE: ICD-10-CM

## 2023-04-19 DIAGNOSIS — E64.2 SEQUELAE OF VITAMIN C DEFICIENCY: ICD-10-CM

## 2023-04-19 DIAGNOSIS — E05.90 THYROTOXICOSIS WITHOUT THYROID STORM, UNSPECIFIED THYROTOXICOSIS TYPE: ICD-10-CM

## 2023-04-19 DIAGNOSIS — R63.0 ANOREXIA: Primary | ICD-10-CM

## 2023-04-19 LAB
25(OH)D3 SERPL-MCNC: 29.4 NG/ML (ref 30–100)
ALBUMIN SERPL BCP-MCNC: 3.7 G/DL (ref 3.5–5)
ALP SERPL-CCNC: 53 U/L (ref 34–104)
ALT SERPL W P-5'-P-CCNC: 17 U/L (ref 7–52)
ANION GAP SERPL CALCULATED.3IONS-SCNC: 10 MMOL/L (ref 4–13)
AST SERPL W P-5'-P-CCNC: 17 U/L (ref 13–39)
BASOPHILS # BLD AUTO: 0.03 THOUSANDS/ΜL (ref 0–0.1)
BASOPHILS NFR BLD AUTO: 0 % (ref 0–1)
BILIRUB SERPL-MCNC: 0.26 MG/DL (ref 0.2–1)
BUN SERPL-MCNC: 7 MG/DL (ref 5–25)
CALCIUM SERPL-MCNC: 8.4 MG/DL (ref 8.4–10.2)
CHLORIDE SERPL-SCNC: 107 MMOL/L (ref 96–108)
CO2 SERPL-SCNC: 21 MMOL/L (ref 21–32)
CREAT SERPL-MCNC: 0.52 MG/DL (ref 0.6–1.3)
CRP SERPL QL: 4.1 MG/L
EOSINOPHIL # BLD AUTO: 0 THOUSAND/ΜL (ref 0–0.61)
EOSINOPHIL NFR BLD AUTO: 0 % (ref 0–6)
ERYTHROCYTE [DISTWIDTH] IN BLOOD BY AUTOMATED COUNT: 15.4 % (ref 11.6–15.1)
GFR SERPL CREATININE-BSD FRML MDRD: 111 ML/MIN/1.73SQ M
GLUCOSE SERPL-MCNC: 153 MG/DL (ref 65–140)
HCT VFR BLD AUTO: 40.3 % (ref 34.8–46.1)
HGB BLD-MCNC: 13.4 G/DL (ref 11.5–15.4)
IMM GRANULOCYTES # BLD AUTO: 0.04 THOUSAND/UL (ref 0–0.2)
IMM GRANULOCYTES NFR BLD AUTO: 0 % (ref 0–2)
LYMPHOCYTES # BLD AUTO: 0.61 THOUSANDS/ΜL (ref 0.6–4.47)
LYMPHOCYTES NFR BLD AUTO: 6 % (ref 14–44)
MCH RBC QN AUTO: 30.7 PG (ref 26.8–34.3)
MCHC RBC AUTO-ENTMCNC: 33.3 G/DL (ref 31.4–37.4)
MCV RBC AUTO: 92 FL (ref 82–98)
MONOCYTES # BLD AUTO: 0.12 THOUSAND/ΜL (ref 0.17–1.22)
MONOCYTES NFR BLD AUTO: 1 % (ref 4–12)
NEUTROPHILS # BLD AUTO: 9.2 THOUSANDS/ΜL (ref 1.85–7.62)
NEUTS SEG NFR BLD AUTO: 93 % (ref 43–75)
NRBC BLD AUTO-RTO: 0 /100 WBCS
PLATELET # BLD AUTO: 496 THOUSANDS/UL (ref 149–390)
PMV BLD AUTO: 9.1 FL (ref 8.9–12.7)
POTASSIUM SERPL-SCNC: 3.7 MMOL/L (ref 3.5–5.3)
PROT SERPL-MCNC: 6.9 G/DL (ref 6.4–8.4)
RBC # BLD AUTO: 4.36 MILLION/UL (ref 3.81–5.12)
SODIUM SERPL-SCNC: 138 MMOL/L (ref 135–147)
T3FREE SERPL-MCNC: 1.85 PG/ML (ref 2.3–4.2)
T4 FREE SERPL-MCNC: 0.9 NG/DL (ref 0.76–1.46)
TSH SERPL DL<=0.05 MIU/L-ACNC: 0.55 UIU/ML (ref 0.45–4.5)
WBC # BLD AUTO: 10 THOUSAND/UL (ref 4.31–10.16)

## 2023-04-19 RX ORDER — METHYLPREDNISOLONE SODIUM SUCCINATE 125 MG/2ML
60 INJECTION, POWDER, LYOPHILIZED, FOR SOLUTION INTRAMUSCULAR; INTRAVENOUS ONCE
Status: CANCELLED
Start: 2023-04-21 | End: 2023-04-20

## 2023-04-19 RX ORDER — METHYLPREDNISOLONE SODIUM SUCCINATE 125 MG/2ML
60 INJECTION, POWDER, LYOPHILIZED, FOR SOLUTION INTRAMUSCULAR; INTRAVENOUS ONCE
Status: DISCONTINUED | OUTPATIENT
Start: 2023-04-19 | End: 2023-04-22 | Stop reason: HOSPADM

## 2023-04-19 RX ADMIN — SODIUM CHLORIDE 1000 ML: 0.9 INJECTION, SOLUTION INTRAVENOUS at 08:21

## 2023-04-21 ENCOUNTER — HOSPITAL ENCOUNTER (OUTPATIENT)
Dept: INFUSION CENTER | Facility: HOSPITAL | Age: 48
Discharge: HOME | End: 2023-04-21
Attending: INTERNAL MEDICINE

## 2023-04-21 VITALS
OXYGEN SATURATION: 85 % | HEART RATE: 85 BPM | RESPIRATION RATE: 17 BRPM | DIASTOLIC BLOOD PRESSURE: 91 MMHG | SYSTOLIC BLOOD PRESSURE: 140 MMHG | TEMPERATURE: 97.9 F

## 2023-04-21 DIAGNOSIS — R63.0 ANOREXIA: Primary | ICD-10-CM

## 2023-04-21 DIAGNOSIS — E44.1 MILD PROTEIN-CALORIE MALNUTRITION (HCC): ICD-10-CM

## 2023-04-21 DIAGNOSIS — R19.7 DIARRHEA, UNSPECIFIED TYPE: ICD-10-CM

## 2023-04-21 RX ORDER — METHYLPREDNISOLONE SODIUM SUCCINATE 125 MG/2ML
60 INJECTION, POWDER, LYOPHILIZED, FOR SOLUTION INTRAMUSCULAR; INTRAVENOUS ONCE
Status: DISCONTINUED | OUTPATIENT
Start: 2023-04-21 | End: 2023-04-24 | Stop reason: HOSPADM

## 2023-04-21 RX ORDER — METHYLPREDNISOLONE SODIUM SUCCINATE 125 MG/2ML
60 INJECTION, POWDER, LYOPHILIZED, FOR SOLUTION INTRAMUSCULAR; INTRAVENOUS ONCE
Status: CANCELLED
Start: 2023-04-21 | End: 2023-04-22

## 2023-04-21 RX ADMIN — SODIUM CHLORIDE 1000 ML: 0.9 INJECTION, SOLUTION INTRAVENOUS at 09:05

## 2023-04-23 LAB — VIT C SERPL-MCNC: 1.4 MG/DL (ref 0.4–2)

## 2023-04-24 ENCOUNTER — HOSPITAL ENCOUNTER (OUTPATIENT)
Dept: INFUSION CENTER | Facility: HOSPITAL | Age: 48
Discharge: HOME | End: 2023-04-24
Attending: INTERNAL MEDICINE

## 2023-04-24 ENCOUNTER — OFFICE VISIT (OUTPATIENT)
Dept: GASTROENTEROLOGY | Facility: CLINIC | Age: 48
End: 2023-04-24

## 2023-04-24 VITALS
RESPIRATION RATE: 17 BRPM | HEART RATE: 92 BPM | SYSTOLIC BLOOD PRESSURE: 127 MMHG | DIASTOLIC BLOOD PRESSURE: 81 MMHG | OXYGEN SATURATION: 96 % | TEMPERATURE: 98.6 F

## 2023-04-24 VITALS
TEMPERATURE: 98.8 F | HEART RATE: 92 BPM | OXYGEN SATURATION: 99 % | SYSTOLIC BLOOD PRESSURE: 152 MMHG | WEIGHT: 154.6 LBS | BODY MASS INDEX: 29.19 KG/M2 | DIASTOLIC BLOOD PRESSURE: 110 MMHG | HEIGHT: 61 IN

## 2023-04-24 DIAGNOSIS — R19.7 DIARRHEA, UNSPECIFIED TYPE: ICD-10-CM

## 2023-04-24 DIAGNOSIS — Z79.52 IMMUNOSUPPRESSION DUE TO CHRONIC STEROID USE (HCC): ICD-10-CM

## 2023-04-24 DIAGNOSIS — K50.119 CROHN'S DISEASE OF COLON WITH COMPLICATION (HCC): Primary | ICD-10-CM

## 2023-04-24 DIAGNOSIS — E44.1 MILD PROTEIN-CALORIE MALNUTRITION (HCC): ICD-10-CM

## 2023-04-24 DIAGNOSIS — R63.0 ANOREXIA: Primary | ICD-10-CM

## 2023-04-24 DIAGNOSIS — D84.9 IMMUNOSUPPRESSED STATUS (HCC): ICD-10-CM

## 2023-04-24 DIAGNOSIS — M79.7 FIBROMYALGIA: ICD-10-CM

## 2023-04-24 DIAGNOSIS — D84.821 IMMUNOSUPPRESSION DUE TO CHRONIC STEROID USE (HCC): ICD-10-CM

## 2023-04-24 DIAGNOSIS — Z79.52 CURRENT CHRONIC USE OF SYSTEMIC STEROIDS: ICD-10-CM

## 2023-04-24 DIAGNOSIS — D89.40 MAST CELL ACTIVATION SYNDROME (HCC): ICD-10-CM

## 2023-04-24 DIAGNOSIS — T38.0X5A IMMUNOSUPPRESSION DUE TO CHRONIC STEROID USE (HCC): ICD-10-CM

## 2023-04-24 RX ORDER — UBIDECARENONE 75 MG
100 CAPSULE ORAL 4 TIMES DAILY
COMMUNITY
Start: 2023-04-19

## 2023-04-24 RX ORDER — METHYLPREDNISOLONE SODIUM SUCCINATE 125 MG/2ML
60 INJECTION, POWDER, LYOPHILIZED, FOR SOLUTION INTRAMUSCULAR; INTRAVENOUS ONCE
Status: COMPLETED | OUTPATIENT
Start: 2023-04-24 | End: 2023-04-24

## 2023-04-24 RX ORDER — METHYLPREDNISOLONE SODIUM SUCCINATE 125 MG/2ML
60 INJECTION, POWDER, LYOPHILIZED, FOR SOLUTION INTRAMUSCULAR; INTRAVENOUS ONCE
Status: CANCELLED
Start: 2023-04-26 | End: 2023-04-25

## 2023-04-24 RX ORDER — POTASSIUM CHLORIDE 20 MEQ/15ML
99 SOLUTION ORAL 2 TIMES DAILY
COMMUNITY
Start: 2023-01-25

## 2023-04-24 RX ADMIN — SODIUM CHLORIDE 1000 ML: 0.9 INJECTION, SOLUTION INTRAVENOUS at 12:15

## 2023-04-24 RX ADMIN — METHYLPREDNISOLONE SODIUM SUCCINATE 60 MG: 125 INJECTION, POWDER, FOR SOLUTION INTRAMUSCULAR; INTRAVENOUS at 14:47

## 2023-04-24 RX ADMIN — DIPHENHYDRAMINE HYDROCHLORIDE 50 MG: 50 INJECTION, SOLUTION INTRAMUSCULAR; INTRAVENOUS at 12:28

## 2023-04-24 NOTE — PROGRESS NOTES
[facility] - Outpatient Follow-up Note  Janet Flores 46 y o  female MRN: [id number]  Encounter: [id number]          ASSESSMENT AND PLAN:    Janet Flores is a 46 y o  female with complex history including hereditary alpha trip to see Laura Schwartz, inflammatory bowel disease (Crohn's versus indeterminate colitis, less likely ulcerative colitis) previously on anti-TNF therapy but complicated by drug-induced lupus and currently on Stelara, prior diagnosis of mast cell activation syndrome for which she has mainly been followed at [address] [place] [address], who presents for follow-up  She is continue to have ups and downs  Currently she is overall doing better but is having issues finding a mass cell activation syndrome specialist     Endoscopy and colonoscopy October 2021 were grossly normal and biopsies with patchy chronic active left-sided colitis and some quiescent colitis  Most recent EGD with some erythema and erosion in the stomach but otherwise normal     MRI/MRCP with no interval progression of mild central intrahepatic and extrahepatic bile duct prominence  Prior abdominal ultrasound with cholecystectomy  Most recent CMP with glucose 152, calcium 7 9, total protein 5 8, albumin 3 4 but otherwise normal   CBC with white blood cell count of 25 89, hemoglobin 14 2, MCV 93, platelets 827  CRP was 5 4 January 16 but most recently has been normal and the last value was 1 2       1  Crohn's disease of colon with complication ([address] )    2  Current chronic use of systemic steroids    3  Diarrhea, unspecified type    4  Fibromyalgia    5  Immunosuppressed status ([address] )    6  Immunosuppression due to chronic steroid use ([address] )    7  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[address] )    8  Mild protein-calorie malnutrition ([facility] )        No orders of the defined types were placed in this encounter  Holding Stelara for now    Waiting colonoscopy for further decisions regarding management  20mg of prednisone in the morning, and then 1 budesonide with lunch and dinner each  Management of Protonix and Pepcid  Next quant gold and hepatitis panel September 2023  Awaiting colonoscopy  Continue blood work as needed    She is currently receiving fluids and hydration, and previously this has been discussed between the patient and her mast cell activation specialist at the [facility]     Continue to follow-up with multiple specialists as needed including neurology, pulmonology, endocrinology, rheumatology, as well as others  Continue vitamin supplementation    Ongoing IBD Healthcare maintenance to be discussed in more detial    Special bowel preparation: 3 days of clear liquids only prior to the colonoscopy  Each night the night before do 2 water enemas  Take 4 dulcolax the day before the colonoscopy  Do 1 water enema the morning of the procedure  I have spent a total time of 40 minutes on 04/24/23 in caring for this patient including Impressions, Counseling / Coordination of care and Obtaining or reviewing history    ______________________________________________________________________    SUBJECTIVE:    Curt Pan is a 46 y o  female who presents with Crohn's and complex medical history here for follow-up  She was recently admitted to the hospital in early March with likely Crohn's colitis flare versus cell activation syndrome flare  She was treated with epinephrine, IV Solu-Medrol, IV Benadryl, IV Pepcid, Xopenex and Ativan  She initially had diarrhea but then developed constipation  Dr Juwan Trinidad sent her an email that she will be resuming clinic in 2024 but no referrals to another physician  She had some issues getting a mast cell specialist  Last week she felt like she had inflammation  She was having nausea, vomiting and diarrhea in March  Then she was dry heaving  She bounced back quickly  The miralax makes her nervous      She takes 20mg of prednisone in the morning, and then 1 budesonide with lunch and dinner each  This works for her  REVIEW OF SYSTEMS IS OTHERWISE NEGATIVE  10 point ROS reviewed and negative, except as above      Historical Information   Past Medical History:   Diagnosis Date   • Anemia 2007   • Anxiety    • Asthma    • Bile duct stricture 2014    Sphincter of oddi dysfunction   • Chronic kidney disease    • Colon polyp 1998   • Crohn's colitis ([facility] [address] )    • Deep vein thrombosis ([facility] )    • Disease of thyroid gland    • 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   • Generalized anxiety disorder 08/26/2017   • GERD (gastroesophageal reflux disease) 1995   • GI (gastrointestinal bleed) 1994   • Heart murmur    • Inflammatory bowel disease    • Irritable bowel syndrome 2016   • Lactose intolerance 1982   • Mast cell disease    • Migraine    • Myocardial infarction [facility])     NSTEMI  pt denies, documented in cardio note   • Pancreatitis     sphinger of    • Psychiatric disorder    • RA (rheumatoid arthritis) ([facility] )    • Seizures ([facility] )    • Ulcerative colitis ([place] [address] )    • Visual impairment      Past Surgical History:   Procedure Laterality Date   • ABDOMINAL SURGERY  2017   • APPENDECTOMY     • CHOLECYSTECTOMY     • COLONOSCOPY  2019   • EGD AND COLONOSCOPY N/A 09/12/2017    Procedure: EGD AND COLONOSCOPY;  Surgeon: Kristen Dobbins MD;  Location: [facility]; Service: Gastroenterology   • ERCP N/A 09/15/2017    Procedure: ENDOSCOPIC RETROGRADE CHOLANGIOPANCREATOGRAPHY (ERCP); Surgeon: Beckie Serna MD;  Location:  GI LAB;   Service: Gastroenterology   • HYSTERECTOMY     • KNEE SURGERY Right    • LAPAROSCOPIC ENDOMETRIOSIS FULGURATION     • ORIF ANKLE FRACTURE Left    • CO ARTHRS KNE SURG W/MENISCECTOMY MED/LAT W/SHVG Left 05/12/2017    Procedure: POSSIBLE MEDIAL MENISECTOMY;  Surgeon: Kobi Fonseca MD;  Location: [facility];  Service: Orthopedics   • CO ARTHRS KNEE DEBRIDEMENT/SHAVING ARTCLR CRTLG Left 05/12/2017    Procedure: KNEE ARTHROSCOPY "EVALUATION, CHONDROPLASTY;  Surgeon: Ellie Abebe MD;  Location: [facility];  Service: Orthopedics   • WI LAPS ABD PRTM&OMENTUM DX W/WO [address] BR/WA SPX N/A 12/12/2017    Procedure: LAPAROSCOPY DIAGNOSTIC  WITH LYSIS OF ADHESIONS;  Surgeon: Isidro Galvan DO;  Location:  MAIN OR;  Service: General   • UPPER GASTROINTESTINAL ENDOSCOPY  2019     Social History   Social History     Substance and Sexual Activity   Alcohol Use Never     Social History     Substance and Sexual Activity   Drug Use Never     Social History     Tobacco Use   Smoking Status Never   Smokeless Tobacco Never     Family History   Problem Relation Age of Onset   • Ulcerative colitis Mother    • Arthritis Mother    • Colon polyps Mother    • Heart failure Father    • Neuropathy Father    • Macular degeneration Father    • Diabetes Father    • Early death Father    • Vision loss Father    • Asthma Daughter    • Hypothyroidism Daughter    • Heart disease Maternal Grandmother    • Arthritis Maternal Grandmother    • No Known Problems Maternal Grandfather    • Arthritis Paternal Grandmother    • Macular degeneration Paternal Grandmother    • Vision loss Paternal Grandmother    • Lymphoma Paternal Grandfather    • Cancer Paternal Grandfather    • Early death Paternal Grandfather    • Breast cancer Maternal Aunt    • Cancer Maternal Aunt    • Miscarriages / Stillbirths Maternal Aunt    • Heart failure Paternal Aunt    • Heart failure Paternal Aunt    • Irritable bowel syndrome Paternal Janas Fuelling    • Breast cancer Paternal Aunt    • Breast cancer additional onset Paternal Janas Fuelling    • Hypothyroidism Paternal Aunt    • Cancer Paternal Aunt    • No Known Problems Son    • No Known Problems Son    • Kidney disease Brother    • Depression Paternal Uncle    • Early death Paternal Uncle        Meds/Allergies       Current Outpatient Medications:   •  B-D 3CC LUER-SHAMEKA SYR 25GX1\" 25G X 1\" 3 ML MISC  •  budesonide (ENTOCORT EC) 3 MG capsule  •  " butalbital-acetaminophen-caffeine (FIORICET,ESGIC) -40 mg per tablet  •  cetirizine (ZyrTEC) 10 mg tablet  •  Cholecalciferol 10 MCG /0 025ML LIQD  •  cyanocobalamin (VITAMIN B-12) 100 mcg tablet  •  cyanocobalamin 1,000 mcg/mL  •  diphenhydrAMINE (BENADRYL) 25 mg tablet  •  diphenhydrAMINE (BENADRYL) 50 mg/mL  •  furosemide (LASIX) 20 mg tablet  •  Lactobacillus (PROBIOTIC ACIDOPHILUS PO)  •  Lactobacillus-Inulin ([facility]) CAPS  •  levalbuterol (XOPENEX HFA) 45 mcg/act inhaler  •  levothyroxine 100 mcg tablet  •  LORazepam (ATIVAN) 1 mg tablet  •  NON FORMULARY  •  nystatin (MYCOSTATIN) 500,000 units/5 mL suspension  •  omalizumab (XOLAIR) 150 mg  •  pantoprazole (PROTONIX) 40 mg tablet  •  Potassium Chloride 10 % SOLN  •  predniSONE 20 mg tablet  •  Riboflavin (Vitamin B-2) 100 MG TABS  •  Stelara 90 MG/ML subcutaneous injection  •  TechLite Lancets MISC  •  diclofenac (VOLTAREN) 75 mg EC tablet  •  Riboflavin (Vitamin B-2) 100 MG TABS  No current facility-administered medications for this visit      Allergies   Allergen Reactions   • Aimovig [Erenumab-Aooe] Anaphylaxis   • Amitriptyline Anaphylaxis     According to the patient she had nphylaxis   • Aspergillus Allergy Skin Test Other (See Comments), Hives and Swelling   • Azathioprine Other (See Comments) and Anaphylaxis     pancreatitis  According to patient she needed Epipen   • Banana - Food Allergy Itching, Swelling and Anaphylaxis   • Buspar [Buspirone] Hives and Shortness Of Breath   • Citric Acid-Polysorbate 80 Abdominal Pain, Anaphylaxis, Anxiety, Bradycardia, Diarrhea, Fatigue, GI Intolerance, Headache, Hives, Hyperactivity, Itching, Lip Swelling, Nausea Only, Palpitations, Rash, Shortness Of Breath, Tachycardia, Throat Swelling, Tongue Swelling and Vomiting   • Corn Dextrin Anaphylaxis     Any corn based products   • Eggs Or Egg-Derived Products - Food Allergy Anaphylaxis   • Epinephrine Anaphylaxis   • Erenumab Anaphylaxis "patient sent portal message stating she had anaphylaxis  patient sent portal message stating she had anaphylaxis     • Gadolinium Abdominal Pain, Anaphylaxis, Anxiety, Confusion, Delirium, Dizziness, Eye Swelling, Fatigue, GI Intolerance, Headache, Hives, Irritability, Itching, Lip Swelling, Nausea Only, Palpitations, Photosensitivity, Rash, Seizures, Shortness Of Breath, Swelling, Syncope, Throat Swelling, Tongue Swelling, Tremor, Visual Disturbance and Vomiting   • Gelatin - Food Allergy Anaphylaxis   • Heparin Hives     Painful welts    • Lactated Ringers Shortness Of Breath     Pt questions this allergy, pt has received LR multiple times without reaction   • Lavender Oil Anaphylaxis     Other reaction(s): anaphalxis   • Malto Dextrin [Dextrin] Anaphylaxis   • Other Anaphylaxis     Gel caps, maltodextrose, dextrose,grapes, lavender    • Penicillium Notatum Shortness Of Breath and Swelling   • Sumatriptan Anaphylaxis     Bradycardia, sob, back pressure, head pain,throat tightness  According to the patient she had anphylaxis   • Ustekinumab Anaphylaxis   • Contrast [Iodinated Contrast Media] Other (See Comments)     Pt states needs to be premedicated prior to injection   • Corn Oil - Food Allergy - Food Allergy Hives   • Humira [Adalimumab] Other (See Comments)     \"I develop drug induced lupus\"   • Ibuprofen Hives and Throat Swelling   • Polyethylene Glycol Diarrhea and Vomiting   • Red Dye - Food Allergy Other (See Comments)     intolerance   • Remicade [Infliximab] Other (See Comments)     \"I develop drug induced lupus\"   • Soy Allergy - Food Allergy Other (See Comments)   • Sulfa Antibiotics Hives and Other (See Comments)   • Sulfate Hives   • Sulfites - Food Allergy Hives   • Chlorhexidine Itching   • Freederm Adhesive Remover [New Skin] Rash   • Histamine Hives, Rash and Other (See Comments)     Intolerance             Objective     Blood pressure (!) 152/110, pulse 92, temperature 98 8 °F (37 1 °C), " "temperature source Tympanic, height 5' 0 5\" (1 537 m), weight 70 1 kg (154 lb 9 6 oz), SpO2 99 %, not currently breastfeeding  Body mass index is 29 7 kg/m²  PHYSICAL EXAMINATION:    General Appearance:   Alert, cooperative, no distress   HEENT:  Normocephalic, atraumatic, anicteric  Neck supple, symmetrical, trachea midline  Lungs:   Equal chest rise and unlabored breathing, normal effort, no coughing  Cardiovascular:   No visualized JVD  Abdomen:   No abdominal distension  Skin:   No jaundice, rashes, or lesions  Musculoskeletal:   Normal range of motion visualized  Psych:  Normal affect and normal insight  Neuro:  Alert and appropriate  Lab Results:   No visits with results within 1 Day(s) from this visit     Latest known visit with results is:   Appointment on 04/19/2023   Component Date Value   • WBC 04/19/2023 10 00    • RBC 04/19/2023 4 36    • Hemoglobin 04/19/2023 13 4    • Hematocrit 04/19/2023 40 3    • MCV 04/19/2023 92    • MCH 04/19/2023 30 7    • MCHC 04/19/2023 33 3    • RDW 04/19/2023 15 4 (H)    • MPV 04/19/2023 9 1    • Platelets 60/62/7864 496 (H)    • nRBC 04/19/2023 0    • Neutrophils Relative 04/19/2023 93 (H)    • Immat GRANS % 04/19/2023 0    • Lymphocytes Relative 04/19/2023 6 (L)    • Monocytes Relative 04/19/2023 1 (L)    • Eosinophils Relative 04/19/2023 0    • Basophils Relative 04/19/2023 0    • Neutrophils Absolute 04/19/2023 9 20 (H)    • Immature Grans Absolute 04/19/2023 0 04    • Lymphocytes Absolute 04/19/2023 0 61    • Monocytes Absolute 04/19/2023 0 12 (L)    • Eosinophils Absolute 04/19/2023 0 00    • Basophils Absolute 04/19/2023 0 03    • Sodium 04/19/2023 138    • Potassium 04/19/2023 3 7    • Chloride 04/19/2023 107    • CO2 04/19/2023 21    • ANION GAP 04/19/2023 10    • BUN 04/19/2023 7    • Creatinine 04/19/2023 0 52 (L)    • Glucose 04/19/2023 153 (H)    • Calcium 04/19/2023 8 4    • AST 04/19/2023 17    • ALT 04/19/2023 17    • Alkaline " Phosphatase 04/19/2023 53    • Total Protein 04/19/2023 6 9    • Albumin 04/19/2023 3 7    • Total Bilirubin 04/19/2023 0 26    • eGFR 04/19/2023 111    • CRP 04/19/2023 4 1 (H)    • Vit D, 25-Hydroxy 04/19/2023 29 4 (L)    • Vitamin C 04/19/2023 1 4    • Free T4 04/19/2023 0 90    • TSH 3RD GENERATON 04/19/2023 0 550    • T3, Free 04/19/2023 1 85 (L)        Lab Results   Component Value Date    WBC 10 00 04/19/2023    HGB 13 4 04/19/2023    HCT 40 3 04/19/2023    MCV 92 04/19/2023     (H) 04/19/2023       Lab Results   Component Value Date     01/16/2015    SODIUM 138 04/19/2023    K 3 7 04/19/2023     04/19/2023    CO2 21 04/19/2023    ANIONGAP 12 01/16/2015    AGAP 10 04/19/2023    BUN 7 04/19/2023    CREATININE 0 52 (L) 04/19/2023    GLUC 153 (H) 04/19/2023    GLUF 79 03/23/2022    CALCIUM 8 4 04/19/2023    AST 17 04/19/2023    ALT 17 04/19/2023    ALKPHOS 53 04/19/2023    PROT 7 7 01/16/2015    TP 6 9 04/19/2023    BILITOT 0 7 01/16/2015    TBILI 0 26 04/19/2023    EGFR 111 04/19/2023       Lab Results   Component Value Date    CRP 4 1 (H) 04/19/2023       Lab Results   Component Value Date    AZI9GGTQWTGZ 0 550 04/19/2023       Lab Results   Component Value Date    IRON 58 09/01/2022    TIBC 355 11/01/2019    FERRITIN 42 11/09/2020       Radiology Results:   No results found

## 2023-04-24 NOTE — H&P (VIEW-ONLY)
[facility] - Outpatient Follow-up Note  Derik Pineda 46 y o  female MRN: [id number]  Encounter: [id number]          ASSESSMENT AND PLAN:    Derik Pineda is a 46 y o  female with complex history including hereditary alpha trip to see Cali Casillas, inflammatory bowel disease (Crohn's versus indeterminate colitis, less likely ulcerative colitis) previously on anti-TNF therapy but complicated by drug-induced lupus and currently on Stelara, prior diagnosis of mast cell activation syndrome for which she has mainly been followed at [address] [place] [address], who presents for follow-up  She is continue to have ups and downs  Currently she is overall doing better but is having issues finding a mass cell activation syndrome specialist     Endoscopy and colonoscopy October 2021 were grossly normal and biopsies with patchy chronic active left-sided colitis and some quiescent colitis  Most recent EGD with some erythema and erosion in the stomach but otherwise normal     MRI/MRCP with no interval progression of mild central intrahepatic and extrahepatic bile duct prominence  Prior abdominal ultrasound with cholecystectomy  Most recent CMP with glucose 152, calcium 7 9, total protein 5 8, albumin 3 4 but otherwise normal   CBC with white blood cell count of 25 89, hemoglobin 14 2, MCV 93, platelets 280  CRP was 5 4 January 16 but most recently has been normal and the last value was 1 2       1  Crohn's disease of colon with complication ([address] )    2  Current chronic use of systemic steroids    3  Diarrhea, unspecified type    4  Fibromyalgia    5  Immunosuppressed status ([address] )    6  Immunosuppression due to chronic steroid use ([address] )    7  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[address] )    8  Mild protein-calorie malnutrition ([facility] )        No orders of the defined types were placed in this encounter  Holding Stelara for now    Waiting colonoscopy for further decisions regarding management  20mg of prednisone in the morning, and then 1 budesonide with lunch and dinner each  Management of Protonix and Pepcid  Next quant gold and hepatitis panel September 2023  Awaiting colonoscopy  Continue blood work as needed    She is currently receiving fluids and hydration, and previously this has been discussed between the patient and her mast cell activation specialist at the [facility]     Continue to follow-up with multiple specialists as needed including neurology, pulmonology, endocrinology, rheumatology, as well as others  Continue vitamin supplementation    Ongoing IBD Healthcare maintenance to be discussed in more detial    Special bowel preparation: 3 days of clear liquids only prior to the colonoscopy  Each night the night before do 2 water enemas  Take 4 dulcolax the day before the colonoscopy  Do 1 water enema the morning of the procedure  I have spent a total time of 40 minutes on 04/24/23 in caring for this patient including Impressions, Counseling / Coordination of care and Obtaining or reviewing history    ______________________________________________________________________    SUBJECTIVE:    Marisela Yepez is a 46 y o  female who presents with Crohn's and complex medical history here for follow-up  She was recently admitted to the hospital in early March with likely Crohn's colitis flare versus cell activation syndrome flare  She was treated with epinephrine, IV Solu-Medrol, IV Benadryl, IV Pepcid, Xopenex and Ativan  She initially had diarrhea but then developed constipation  Dr Mary Jo Norman sent her an email that she will be resuming clinic in 2024 but no referrals to another physician  She had some issues getting a mast cell specialist  Last week she felt like she had inflammation  She was having nausea, vomiting and diarrhea in March  Then she was dry heaving  She bounced back quickly  The miralax makes her nervous      She takes 20mg of prednisone in the morning, and then 1 budesonide with lunch and dinner each  This works for her  REVIEW OF SYSTEMS IS OTHERWISE NEGATIVE  10 point ROS reviewed and negative, except as above      Historical Information   Past Medical History:   Diagnosis Date    Anemia 2007    Anxiety     Asthma     Bile duct stricture 2014    Sphincter of oddi dysfunction    Chronic kidney disease     Colon polyp 1998    [facility]'s colitis [facility]     Deep vein thrombosis ([facility] )     Disease of thyroid gland     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    Generalized anxiety disorder 08/26/2017    GERD (gastroesophageal reflux disease) 1995    GI (gastrointestinal bleed) 1994    Heart murmur     Inflammatory bowel disease     Irritable bowel syndrome 2016    Lactose intolerance 1982    Mast cell disease     Migraine     Myocardial infarction ([facility] )     NSTEMI  pt denies, documented in cardio note    Pancreatitis     sphinger of     Psychiatric disorder     RA (rheumatoid arthritis) ([place] [address] )     Seizures ([place] [address] )     Ulcerative colitis ([place] [address] )     Visual impairment      Past Surgical History:   Procedure Laterality Date    ABDOMINAL SURGERY  2017    APPENDECTOMY      CHOLECYSTECTOMY      COLONOSCOPY  2019    EGD AND COLONOSCOPY N/A 09/12/2017    Procedure: EGD AND COLONOSCOPY;  Surgeon: Ming Herr MD;  [facility]; Service: Gastroenterology    ERCP N/A 09/15/2017    Procedure: ENDOSCOPIC RETROGRADE CHOLANGIOPANCREATOGRAPHY (ERCP); Surgeon: Prashant Olivares MD;  [facility];   Service: Gastroenterology    HYSTERECTOMY      KNEE SURGERY Right     LAPAROSCOPIC ENDOMETRIOSIS FULGURATION      ORIF ANKLE FRACTURE Left     AK ARTHRS KNE SURG W/MENISCECTOMY MED/LAT W/SHVG Left 05/12/2017    Procedure: POSSIBLE MEDIAL MENISECTOMY;  Surgeon: Yary Miller MD;  [facility];  Service: Orthopedics    AK ARTHRS KNEE DEBRIDEMENT/SHAVING ARTCLR CRTLG Left 05/12/2017    Procedure: KNEE ARTHROSCOPY "EVALUATION, CHONDROPLASTY;  Surgeon: Jose Martinez MD;  [facility];  Service: Orthopedics    MS LAPS ABD PRTM&OMENTUM DX W/WO [address]/ [facility] N/A 12/12/2017    Procedure: LAPAROSCOPY DIAGNOSTIC  WITH LYSIS OF ADHESIONS;  Surgeon: Jessica Drummond DO;  Location:  MAIN OR;  Service: General    UPPER GASTROINTESTINAL ENDOSCOPY  2019     Social History   Social History     Substance and Sexual Activity   Alcohol Use Never     Social History     Substance and Sexual Activity   Drug Use Never     Social History     Tobacco Use   Smoking Status Never   Smokeless Tobacco Never     Family History   Problem Relation Age of Onset    Ulcerative colitis Mother     Arthritis Mother     Colon polyps Mother     Heart failure Father     Neuropathy Father     Macular degeneration Father     Diabetes Father     Early death Father     Vision loss Father     Asthma Daughter     Hypothyroidism Daughter     Heart disease Maternal Grandmother     Arthritis Maternal Grandmother     No Known Problems Maternal Grandfather     Arthritis Paternal Grandmother     Macular degeneration Paternal Grandmother     Vision loss Paternal Grandmother     Lymphoma Paternal Grandfather     Cancer Paternal Grandfather     Early death Paternal Grandfather     Breast cancer Maternal Aunt     Cancer Maternal Aunt     Miscarriages / Stillbirths Maternal Aunt     Heart failure Paternal Aunt     Heart failure Paternal Aunt     Irritable bowel syndrome Paternal Aunt     Breast cancer Paternal Aunt     Breast cancer additional onset Paternal Aunt     Hypothyroidism Paternal Aunt     Cancer Paternal Aunt     No Known Problems Son     No Known Problems Son     Kidney disease Brother     Depression Paternal Uncle     Early death Paternal Uncle        Meds/Allergies       Current Outpatient Medications:     B-D 3CC LUER-SHAMEKA SYR 25GX1\" 25G X 1\" 3 ML MISC    budesonide (ENTOCORT EC) 3 MG capsule    " butalbital-acetaminophen-caffeine (FIORICET,ESGIC) -40 mg per tablet    cetirizine (ZyrTEC) 10 mg tablet    Cholecalciferol 10 MCG /0 025ML LIQD    cyanocobalamin (VITAMIN B-12) 100 mcg tablet    cyanocobalamin 1,000 mcg/mL    diphenhydrAMINE (BENADRYL) 25 mg tablet    diphenhydrAMINE (BENADRYL) 50 mg/mL    furosemide (LASIX) 20 mg tablet    Lactobacillus (PROBIOTIC ACIDOPHILUS PO)    Lactobacillus-Inulin ([employer]) CAPS    levalbuterol (XOPENEX HFA) 45 mcg/act inhaler    levothyroxine 100 mcg tablet    LORazepam (ATIVAN) 1 mg tablet    NON FORMULARY    nystatin (MYCOSTATIN) 500,000 units/5 mL suspension    omalizumab (XOLAIR) 150 mg    pantoprazole (PROTONIX) 40 mg tablet    Potassium Chloride 10 % SOLN    predniSONE 20 mg tablet    Riboflavin (Vitamin B-2) 100 MG TABS    Stelara 90 MG/ML subcutaneous injection    TechLite Lancets MISC    diclofenac (VOLTAREN) 75 mg EC tablet    Riboflavin (Vitamin B-2) 100 MG TABS  No current facility-administered medications for this visit      Allergies   Allergen Reactions    Aimovig [Erenumab-Aooe] Anaphylaxis    Amitriptyline Anaphylaxis     According to the patient she had nphylaxis    Aspergillus Allergy Skin Test Other (See Comments), Hives and Swelling    Azathioprine Other (See Comments) and Anaphylaxis     pancreatitis  According to patient she needed Epipen    Banana - Food Allergy Itching, Swelling and Anaphylaxis    Buspar [Buspirone] Hives and Shortness Of Breath    Citric Acid-Polysorbate 80 Abdominal Pain, Anaphylaxis, Anxiety, Bradycardia, Diarrhea, Fatigue, GI Intolerance, Headache, Hives, Hyperactivity, Itching, Lip Swelling, Nausea Only, Palpitations, Rash, Shortness Of Breath, Tachycardia, Throat Swelling, Tongue Swelling and Vomiting    Corn Dextrin Anaphylaxis     Any corn based products    Eggs Or Egg-Derived Products - Food Allergy Anaphylaxis    Epinephrine Anaphylaxis    Erenumab Anaphylaxis "patient sent portal message stating she had anaphylaxis  patient sent portal message stating she had anaphylaxis      Gadolinium Abdominal Pain, Anaphylaxis, Anxiety, Confusion, Delirium, Dizziness, Eye Swelling, Fatigue, GI Intolerance, Headache, Hives, Irritability, Itching, Lip Swelling, Nausea Only, Palpitations, Photosensitivity, Rash, Seizures, Shortness Of Breath, Swelling, Syncope, Throat Swelling, Tongue Swelling, Tremor, Visual Disturbance and Vomiting    Gelatin - Food Allergy Anaphylaxis    Heparin Hives     Painful welts     Lactated Ringers Shortness Of Breath     Pt questions this allergy, pt has received LR multiple times without reaction    Lavender Oil Anaphylaxis     Other reaction(s): anaphalxis    Malto Dextrin [Dextrin] Anaphylaxis    Other Anaphylaxis     Gel caps, maltodextrose, dextrose,grapes, lavender     Penicillium Notatum Shortness Of Breath and Swelling    Sumatriptan Anaphylaxis     Bradycardia, sob, back pressure, head pain,throat tightness  According to the patient she had anphylaxis    Ustekinumab Anaphylaxis    Contrast [Iodinated Contrast Media] Other (See Comments)     Pt states needs to be premedicated prior to injection   [employer] - Food Allergy - Food Allergy Hives    Humira [Adalimumab] Other (See Comments)     \"I develop drug induced lupus\"    Ibuprofen Hives and Throat Swelling    Polyethylene Glycol Diarrhea and Vomiting    Red Dye - Food Allergy Other (See Comments)     intolerance    Remicade [Infliximab] Other (See Comments)     \"I develop drug induced lupus\"    Soy Allergy - Food Allergy Other (See Comments)    Sulfa Antibiotics Hives and Other (See Comments)    Sulfate Hives    Sulfites - Food Allergy Hives    Chlorhexidine Itching    Freederm Adhesive Remover [New Skin] Rash    Histamine Hives, Rash and Other (See Comments)     Intolerance             Objective     Blood pressure (!) 152/110, pulse 92, temperature 98 8 °F (37 1 °C), " "temperature source Tympanic, height 5' 0 5\" (1 537 m), weight 70 1 kg (154 lb 9 6 oz), SpO2 99 %, not currently breastfeeding  Body mass index is 29 7 kg/m²  PHYSICAL EXAMINATION:    General Appearance:   Alert, cooperative, no distress   HEENT:  Normocephalic, atraumatic, anicteric  Neck supple, symmetrical, trachea midline  Lungs:   Equal chest rise and unlabored breathing, normal effort, no coughing  Cardiovascular:   No visualized JVD  Abdomen:   No abdominal distension  Skin:   No jaundice, rashes, or lesions  Musculoskeletal:   Normal range of motion visualized  Psych:  Normal affect and normal insight  Neuro:  Alert and appropriate  Lab Results:   No visits with results within 1 Day(s) from this visit     Latest known visit with results is:   Appointment on 04/19/2023   Component Date Value    WBC 04/19/2023 10 00     RBC 04/19/2023 4 36     Hemoglobin 04/19/2023 13 4     Hematocrit 04/19/2023 40 3     MCV 04/19/2023 92     MCH 04/19/2023 30 7     MCHC 04/19/2023 33 3     RDW 04/19/2023 15 4 (H)     MPV 04/19/2023 9 1     Platelets 90/75/0695 496 (H)     nRBC 04/19/2023 0     Neutrophils Relative 04/19/2023 93 (H)     Immat GRANS % 04/19/2023 0     Lymphocytes Relative 04/19/2023 6 (L)     Monocytes Relative 04/19/2023 1 (L)     Eosinophils Relative 04/19/2023 0     Basophils Relative 04/19/2023 0     Neutrophils Absolute 04/19/2023 9 20 (H)     Immature Grans Absolute 04/19/2023 0 04     Lymphocytes Absolute 04/19/2023 0 61     Monocytes Absolute 04/19/2023 0 12 (L)     Eosinophils Absolute 04/19/2023 0 00     Basophils Absolute 04/19/2023 0 03     Sodium 04/19/2023 138     Potassium 04/19/2023 3 7     Chloride 04/19/2023 107     CO2 04/19/2023 21     ANION GAP 04/19/2023 10     BUN 04/19/2023 7     Creatinine 04/19/2023 0 52 (L)     Glucose 04/19/2023 153 (H)     Calcium 04/19/2023 8 4     AST 04/19/2023 17     ALT 04/19/2023 17     Alkaline " Phosphatase 04/19/2023 53     Total Protein 04/19/2023 6 9     Albumin 04/19/2023 3 7     Total Bilirubin 04/19/2023 0 26     eGFR 04/19/2023 111     CRP 04/19/2023 4 1 (H)     Vit D, 25-Hydroxy 04/19/2023 29 4 (L)     Vitamin C 04/19/2023 1 4     Free T4 04/19/2023 0 90     TSH 3RD GENERATON 04/19/2023 0 550     T3, Free 04/19/2023 1 85 (L)        Lab Results   Component Value Date    WBC 10 00 04/19/2023    HGB 13 4 04/19/2023    HCT 40 3 04/19/2023    MCV 92 04/19/2023     (H) 04/19/2023       Lab Results   Component Value Date     01/16/2015    SODIUM 138 04/19/2023    K 3 7 04/19/2023     04/19/2023    CO2 21 04/19/2023    ANIONGAP 12 01/16/2015    AGAP 10 04/19/2023    BUN 7 04/19/2023    CREATININE 0 52 (L) 04/19/2023    GLUC 153 (H) 04/19/2023    GLUF 79 03/23/2022    CALCIUM 8 4 04/19/2023    AST 17 04/19/2023    ALT 17 04/19/2023    ALKPHOS 53 04/19/2023    PROT 7 7 01/16/2015    TP 6 9 04/19/2023    BILITOT 0 7 01/16/2015    TBILI 0 26 04/19/2023    EGFR 111 04/19/2023       Lab Results   Component Value Date    CRP 4 1 (H) 04/19/2023       Lab Results   Component Value Date    UOQ7MARCONNP 0 550 04/19/2023       Lab Results   Component Value Date    IRON 58 09/01/2022    TIBC 355 11/01/2019    FERRITIN 42 11/09/2020       Radiology Results:   No results found

## 2023-04-24 NOTE — PATIENT INSTRUCTIONS
Special bowel preparation: 3 days of clear liquids only prior to the colonoscopy  Each night the night before do 2 water enemas  Take 4 dulcolax the day before the colonoscopy at 6pm  Do 1 water enema the morning of the procedure

## 2023-04-25 ENCOUNTER — TELEPHONE (OUTPATIENT)
Dept: GASTROENTEROLOGY | Facility: CLINIC | Age: 48
End: 2023-04-25

## 2023-04-25 LAB
THYROGLOB AB SERPL-ACNC: 5.1 IU/ML (ref 0–0.9)
THYROGLOB SERPL-MCNC: 5.1 NG/ML

## 2023-04-25 NOTE — TELEPHONE ENCOUNTER
Confirmed upcomming procedure 
Patient coming into office for procedure packet with special colonoscopy procedure instructions as per office note  3 days clear liquids only prior to the colonoscopy  Each night before do 2 water enemas  Take 4 dulcolax the day before the colonoscopy  Do 1 water enema the morning of procedure 
1 week

## 2023-04-27 DIAGNOSIS — D89.40 MAST CELL ACTIVATION SYNDROME (HCC): ICD-10-CM

## 2023-04-27 DIAGNOSIS — G43.809 OTHER MIGRAINE WITHOUT STATUS MIGRAINOSUS, NOT INTRACTABLE: ICD-10-CM

## 2023-04-27 RX ORDER — LEVALBUTEROL TARTRATE 45 UG/1
1-2 AEROSOL, METERED ORAL EVERY 4 HOURS PRN
Qty: 15 G | Refills: 0 | Status: SHIPPED | OUTPATIENT
Start: 2023-04-27

## 2023-04-27 RX ORDER — BUTALBITAL, ACETAMINOPHEN AND CAFFEINE 50; 325; 40 MG/1; MG/1; MG/1
1 TABLET ORAL EVERY 8 HOURS PRN
Qty: 20 TABLET | Refills: 0 | OUTPATIENT
Start: 2023-04-27

## 2023-04-27 NOTE — TELEPHONE ENCOUNTER
Medication Refill Request     Name Xopenex   Dose/Frequency 45 mcg q 4h prn   Quantity 1  Verified pharmacy   [x]  Verified ordering Provider   [x]  Does patient have enough for the next 3 days? Yes [] No [x]    Medication Refill Request     Name Fioricet   Dose/Frequency 50/325/40  Quantity 20  Verified pharmacy   [x]  Verified ordering Provider   [x]  Does patient have enough for the next 3 days?  Yes [] No [x]

## 2023-04-28 ENCOUNTER — HOSPITAL ENCOUNTER (OUTPATIENT)
Dept: INFUSION CENTER | Facility: HOSPITAL | Age: 48
Discharge: HOME | End: 2023-04-28
Attending: INTERNAL MEDICINE

## 2023-04-28 VITALS
DIASTOLIC BLOOD PRESSURE: 73 MMHG | TEMPERATURE: 97.3 F | OXYGEN SATURATION: 96 % | HEART RATE: 102 BPM | SYSTOLIC BLOOD PRESSURE: 108 MMHG | RESPIRATION RATE: 17 BRPM

## 2023-04-28 DIAGNOSIS — R63.0 ANOREXIA: Primary | ICD-10-CM

## 2023-04-28 DIAGNOSIS — E44.1 MILD PROTEIN-CALORIE MALNUTRITION (HCC): ICD-10-CM

## 2023-04-28 DIAGNOSIS — R19.7 DIARRHEA, UNSPECIFIED TYPE: ICD-10-CM

## 2023-04-28 RX ORDER — METHYLPREDNISOLONE SODIUM SUCCINATE 125 MG/2ML
60 INJECTION, POWDER, LYOPHILIZED, FOR SOLUTION INTRAMUSCULAR; INTRAVENOUS ONCE
Status: COMPLETED | OUTPATIENT
Start: 2023-04-28 | End: 2023-04-28

## 2023-04-28 RX ORDER — METHYLPREDNISOLONE SODIUM SUCCINATE 125 MG/2ML
60 INJECTION, POWDER, LYOPHILIZED, FOR SOLUTION INTRAMUSCULAR; INTRAVENOUS ONCE
Status: CANCELLED
Start: 2023-04-28 | End: 2023-04-29

## 2023-04-28 RX ADMIN — DIPHENHYDRAMINE HYDROCHLORIDE 50 MG: 50 INJECTION, SOLUTION INTRAMUSCULAR; INTRAVENOUS at 13:34

## 2023-04-28 RX ADMIN — METHYLPREDNISOLONE SODIUM SUCCINATE 60 MG: 125 INJECTION, POWDER, FOR SOLUTION INTRAMUSCULAR; INTRAVENOUS at 14:42

## 2023-04-28 RX ADMIN — SODIUM CHLORIDE 1000 ML: 0.9 INJECTION, SOLUTION INTRAVENOUS at 12:00

## 2023-05-01 ENCOUNTER — HOSPITAL ENCOUNTER (OUTPATIENT)
Dept: INFUSION CENTER | Facility: HOSPITAL | Age: 48
Discharge: HOME | End: 2023-05-01
Attending: INTERNAL MEDICINE

## 2023-05-01 VITALS
DIASTOLIC BLOOD PRESSURE: 63 MMHG | RESPIRATION RATE: 17 BRPM | TEMPERATURE: 97 F | SYSTOLIC BLOOD PRESSURE: 110 MMHG | HEART RATE: 116 BPM | OXYGEN SATURATION: 95 %

## 2023-05-01 DIAGNOSIS — R19.7 DIARRHEA, UNSPECIFIED TYPE: ICD-10-CM

## 2023-05-01 DIAGNOSIS — R63.0 ANOREXIA: Primary | ICD-10-CM

## 2023-05-01 DIAGNOSIS — E44.1 MILD PROTEIN-CALORIE MALNUTRITION (HCC): ICD-10-CM

## 2023-05-01 RX ORDER — METHYLPREDNISOLONE SODIUM SUCCINATE 125 MG/2ML
60 INJECTION, POWDER, LYOPHILIZED, FOR SOLUTION INTRAMUSCULAR; INTRAVENOUS ONCE
Status: COMPLETED | OUTPATIENT
Start: 2023-05-01 | End: 2023-05-01

## 2023-05-01 RX ORDER — METHYLPREDNISOLONE SODIUM SUCCINATE 125 MG/2ML
60 INJECTION, POWDER, LYOPHILIZED, FOR SOLUTION INTRAMUSCULAR; INTRAVENOUS ONCE
Status: CANCELLED
Start: 2023-05-03 | End: 2023-05-02

## 2023-05-01 RX ADMIN — METHYLPREDNISOLONE SODIUM SUCCINATE 60 MG: 125 INJECTION, POWDER, FOR SOLUTION INTRAMUSCULAR; INTRAVENOUS at 11:53

## 2023-05-01 RX ADMIN — DIPHENHYDRAMINE HYDROCHLORIDE 50 MG: 50 INJECTION, SOLUTION INTRAMUSCULAR; INTRAVENOUS at 12:01

## 2023-05-01 RX ADMIN — SODIUM CHLORIDE 1000 ML: 0.9 INJECTION, SOLUTION INTRAVENOUS at 11:23

## 2023-05-02 ENCOUNTER — ANESTHESIA (OUTPATIENT)
Dept: PERIOP | Facility: HOSPITAL | Age: 48
End: 2023-05-02

## 2023-05-02 ENCOUNTER — ANESTHESIA EVENT (OUTPATIENT)
Dept: PERIOP | Facility: HOSPITAL | Age: 48
End: 2023-05-02

## 2023-05-02 ENCOUNTER — ANESTHESIA (OUTPATIENT)
Dept: ANESTHESIOLOGY | Facility: HOSPITAL | Age: 48
End: 2023-05-02

## 2023-05-02 ENCOUNTER — HOSPITAL ENCOUNTER (OUTPATIENT)
Dept: PERIOP | Facility: HOSPITAL | Age: 48
Setting detail: OUTPATIENT SURGERY
Discharge: HOME | End: 2023-05-02
Attending: INTERNAL MEDICINE

## 2023-05-02 ENCOUNTER — ANESTHESIA EVENT (OUTPATIENT)
Dept: ANESTHESIOLOGY | Facility: HOSPITAL | Age: 48
End: 2023-05-02

## 2023-05-02 VITALS
HEART RATE: 69 BPM | HEIGHT: 61 IN | BODY MASS INDEX: 27.94 KG/M2 | RESPIRATION RATE: 20 BRPM | WEIGHT: 148 LBS | OXYGEN SATURATION: 100 % | TEMPERATURE: 97.6 F | DIASTOLIC BLOOD PRESSURE: 86 MMHG | SYSTOLIC BLOOD PRESSURE: 132 MMHG

## 2023-05-02 DIAGNOSIS — K50.119 CROHN'S DISEASE OF COLON WITH COMPLICATION (HCC): ICD-10-CM

## 2023-05-02 RX ORDER — PROPOFOL 10 MG/ML
INJECTION, EMULSION INTRAVENOUS CONTINUOUS PRN
Status: DISCONTINUED | OUTPATIENT
Start: 2023-05-02 | End: 2023-05-02

## 2023-05-02 RX ORDER — PROPOFOL 10 MG/ML
INJECTION, EMULSION INTRAVENOUS AS NEEDED
Status: DISCONTINUED | OUTPATIENT
Start: 2023-05-02 | End: 2023-05-02

## 2023-05-02 RX ORDER — DIPHENHYDRAMINE HYDROCHLORIDE 50 MG/ML
50 INJECTION INTRAMUSCULAR; INTRAVENOUS ONCE
Status: COMPLETED | OUTPATIENT
Start: 2023-05-02 | End: 2023-05-02

## 2023-05-02 RX ORDER — SODIUM CHLORIDE 9 MG/ML
INJECTION, SOLUTION INTRAVENOUS CONTINUOUS PRN
Status: DISCONTINUED | OUTPATIENT
Start: 2023-05-02 | End: 2023-05-02

## 2023-05-02 RX ORDER — LIDOCAINE HYDROCHLORIDE 20 MG/ML
INJECTION, SOLUTION EPIDURAL; INFILTRATION; INTRACAUDAL; PERINEURAL AS NEEDED
Status: DISCONTINUED | OUTPATIENT
Start: 2023-05-02 | End: 2023-05-02

## 2023-05-02 RX ORDER — METHYLPREDNISOLONE SODIUM SUCCINATE 125 MG/2ML
60 INJECTION, POWDER, LYOPHILIZED, FOR SOLUTION INTRAMUSCULAR; INTRAVENOUS ONCE
Status: COMPLETED | OUTPATIENT
Start: 2023-05-02 | End: 2023-05-02

## 2023-05-02 RX ADMIN — LIDOCAINE HYDROCHLORIDE 100 MG: 20 INJECTION, SOLUTION EPIDURAL; INFILTRATION; INTRACAUDAL; PERINEURAL at 11:12

## 2023-05-02 RX ADMIN — PROPOFOL 40 MG: 10 INJECTION, EMULSION INTRAVENOUS at 11:15

## 2023-05-02 RX ADMIN — FAMOTIDINE 40 MG: 10 INJECTION, SOLUTION INTRAVENOUS at 10:17

## 2023-05-02 RX ADMIN — PROPOFOL 150 MCG/KG/MIN: 10 INJECTION, EMULSION INTRAVENOUS at 11:18

## 2023-05-02 RX ADMIN — SODIUM CHLORIDE: 0.9 INJECTION, SOLUTION INTRAVENOUS at 11:08

## 2023-05-02 RX ADMIN — METHYLPREDNISOLONE SODIUM SUCCINATE 60 MG: 125 INJECTION, POWDER, FOR SOLUTION INTRAMUSCULAR; INTRAVENOUS at 10:08

## 2023-05-02 RX ADMIN — SODIUM CHLORIDE 500 ML: 0.9 INJECTION, SOLUTION INTRAVENOUS at 10:00

## 2023-05-02 RX ADMIN — PROPOFOL 160 MG: 10 INJECTION, EMULSION INTRAVENOUS at 11:12

## 2023-05-02 RX ADMIN — PROPOFOL 20 MG: 10 INJECTION, EMULSION INTRAVENOUS at 11:16

## 2023-05-02 RX ADMIN — DIPHENHYDRAMINE HYDROCHLORIDE 50 MG: 50 INJECTION, SOLUTION INTRAMUSCULAR; INTRAVENOUS at 10:02

## 2023-05-02 NOTE — ANESTHESIA POSTPROCEDURE EVALUATION
Post-Op Assessment Note    CV Status:  Stable  Pain Score: 0    Pain management: adequate     Mental Status:  Sleepy   Hydration Status:  Euvolemic and stable   PONV Controlled:  None   Airway Patency:  Patent   Two or more mitigation strategies used for obstructive sleep apnea   Post Op Vitals Reviewed: Yes      Staff: CRNA         No notable events documented      BP   118/83   Temp   97 5   Pulse  78   Resp   20   SpO2   100

## 2023-05-02 NOTE — INTERVAL H&P NOTE
H&P reviewed  After examining the patient I find no changes in the patients condition since the H&P had been written      Vitals:    05/02/23 0930   BP: 131/81   Pulse: 96   Resp: 18   Temp: 97 6 °F (36 4 °C)   SpO2: 95%

## 2023-05-02 NOTE — ANESTHESIA PREPROCEDURE EVALUATION
Procedure:  PRE-OP ONLY    Relevant Problems   ANESTHESIA (within normal limits)      CARDIO  Most recent echo: normal LV fn, no wall motion abnormalities and valves OK   (+) Atrial tachycardia (HCC)   (+) CHF (congestive heart failure) (HCC)   (+) Migraine      ENDO   (+) Hypothyroidism (acquired)      /RENAL   (+) Nephrolithiasis      MUSCULOSKELETAL   (+) Chondromalacia   (+) Chronic back pain   (+) Fibromyalgia   (+) Primary localized osteoarthrosis of ankle and foot      NEURO/PSYCH   (+) Chronic back pain   (+) Fibromyalgia   (+) Generalized anxiety disorder   (+) Headache   (+) Migraine      PULMONARY (within normal limits)      Other   (+) 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HCC)        Physical Exam    Airway    Mallampati score: II  TM Distance: >3 FB  Neck ROM: full     Dental   No notable dental hx     Cardiovascular  Cardiovascular exam normal    Pulmonary  Pulmonary exam normal     Other Findings        Anesthesia Plan  ASA Score- 2     Anesthesia Type- IV sedation with anesthesia with ASA Monitors  Additional Monitors:   Airway Plan:           Plan Factors-Exercise tolerance (METS): >4 METS  Chart reviewed  EKG reviewed  Existing labs reviewed  Patient summary reviewed  Patient is not a current smoker  Induction- intravenous  Postoperative Plan-     Informed Consent- Anesthetic plan and risks discussed with patient  I personally reviewed this patient with the CRNA  Discussed and agreed on the Anesthesia Plan with the CRNA  Kristen Hicks

## 2023-05-02 NOTE — ANESTHESIA PREPROCEDURE EVALUATION
Procedure:  COLONOSCOPY  EGD    Relevant Problems   ANESTHESIA (within normal limits)      CARDIO  Most recent echo: normal LV fn, no wall motion abnormalities and valves OK   (+) Atrial tachycardia (HCC)   (+) CHF (congestive heart failure) (HCC)   (+) Migraine      ENDO   (+) Hypothyroidism (acquired)      /RENAL   (+) Nephrolithiasis      MUSCULOSKELETAL   (+) Chondromalacia   (+) Chronic back pain   (+) Fibromyalgia   (+) Primary localized osteoarthrosis of ankle and foot      NEURO/PSYCH   (+) Chronic back pain   (+) Fibromyalgia   (+) Generalized anxiety disorder   (+) Headache   (+) Migraine      PULMONARY (within normal limits)      Other   (+) 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HCC)        Physical Exam    Airway    Mallampati score: II  TM Distance: >3 FB  Neck ROM: full     Dental   No notable dental hx     Cardiovascular  Cardiovascular exam normal    Pulmonary  Pulmonary exam normal     Other Findings        Anesthesia Plan  ASA Score- 2     Anesthesia Type- IV sedation with anesthesia with ASA Monitors  Additional Monitors:   Airway Plan:           Plan Factors-Exercise tolerance (METS): >4 METS  Chart reviewed  EKG reviewed  Existing labs reviewed  Patient summary reviewed  Patient is not a current smoker  Induction- intravenous  Postoperative Plan-     Informed Consent- Anesthetic plan and risks discussed with patient  I personally reviewed this patient with the CRNA  Discussed and agreed on the Anesthesia Plan with the CRNA  Jenny Hernandez

## 2023-05-03 ENCOUNTER — HOSPITAL ENCOUNTER (OUTPATIENT)
Dept: INFUSION CENTER | Facility: HOSPITAL | Age: 48
Discharge: HOME | End: 2023-05-03
Attending: INTERNAL MEDICINE

## 2023-05-03 ENCOUNTER — HOSPITAL ENCOUNTER (OUTPATIENT)
Dept: INFUSION CENTER | Facility: HOSPITAL | Age: 48
Discharge: HOME | End: 2023-05-03

## 2023-05-03 VITALS
HEART RATE: 92 BPM | RESPIRATION RATE: 16 BRPM | SYSTOLIC BLOOD PRESSURE: 127 MMHG | OXYGEN SATURATION: 98 % | TEMPERATURE: 97.2 F | DIASTOLIC BLOOD PRESSURE: 82 MMHG

## 2023-05-03 DIAGNOSIS — R19.7 DIARRHEA, UNSPECIFIED TYPE: ICD-10-CM

## 2023-05-03 DIAGNOSIS — R63.0 ANOREXIA: Primary | ICD-10-CM

## 2023-05-03 DIAGNOSIS — E44.1 MILD PROTEIN-CALORIE MALNUTRITION (HCC): ICD-10-CM

## 2023-05-03 RX ORDER — METHYLPREDNISOLONE SODIUM SUCCINATE 125 MG/2ML
60 INJECTION, POWDER, LYOPHILIZED, FOR SOLUTION INTRAMUSCULAR; INTRAVENOUS ONCE
Status: CANCELLED
Start: 2023-05-04 | End: 2023-05-04

## 2023-05-03 RX ORDER — METHYLPREDNISOLONE SODIUM SUCCINATE 125 MG/2ML
60 INJECTION, POWDER, LYOPHILIZED, FOR SOLUTION INTRAMUSCULAR; INTRAVENOUS ONCE
Status: COMPLETED | OUTPATIENT
Start: 2023-05-03 | End: 2023-05-03

## 2023-05-03 RX ADMIN — SODIUM CHLORIDE 1000 ML: 0.9 INJECTION, SOLUTION INTRAVENOUS at 14:05

## 2023-05-03 RX ADMIN — DIPHENHYDRAMINE HYDROCHLORIDE 50 MG: 50 INJECTION, SOLUTION INTRAMUSCULAR; INTRAVENOUS at 14:25

## 2023-05-03 RX ADMIN — METHYLPREDNISOLONE SODIUM SUCCINATE 60 MG: 125 INJECTION, POWDER, FOR SOLUTION INTRAMUSCULAR; INTRAVENOUS at 14:16

## 2023-05-04 ENCOUNTER — HOSPITAL ENCOUNTER (OUTPATIENT)
Dept: INFUSION CENTER | Facility: HOSPITAL | Age: 48
End: 2023-05-04
Attending: INTERNAL MEDICINE

## 2023-05-04 DIAGNOSIS — R19.7 DIARRHEA, UNSPECIFIED TYPE: ICD-10-CM

## 2023-05-04 DIAGNOSIS — E44.1 MILD PROTEIN-CALORIE MALNUTRITION (HCC): ICD-10-CM

## 2023-05-04 DIAGNOSIS — R63.0 ANOREXIA: Primary | ICD-10-CM

## 2023-05-04 RX ORDER — METHYLPREDNISOLONE SODIUM SUCCINATE 125 MG/2ML
60 INJECTION, POWDER, LYOPHILIZED, FOR SOLUTION INTRAMUSCULAR; INTRAVENOUS ONCE
Status: CANCELLED
Start: 2023-05-05 | End: 2023-05-05

## 2023-05-04 RX ORDER — METHYLPREDNISOLONE SODIUM SUCCINATE 125 MG/2ML
60 INJECTION, POWDER, LYOPHILIZED, FOR SOLUTION INTRAMUSCULAR; INTRAVENOUS ONCE
Status: COMPLETED | OUTPATIENT
Start: 2023-05-04 | End: 2023-05-04

## 2023-05-04 RX ADMIN — SODIUM CHLORIDE 1000 ML: 0.9 INJECTION, SOLUTION INTRAVENOUS at 12:44

## 2023-05-04 RX ADMIN — METHYLPREDNISOLONE SODIUM SUCCINATE 60 MG: 125 INJECTION, POWDER, FOR SOLUTION INTRAMUSCULAR; INTRAVENOUS at 12:36

## 2023-05-04 RX ADMIN — DIPHENHYDRAMINE HYDROCHLORIDE 50 MG: 50 INJECTION INTRAMUSCULAR; INTRAVENOUS at 12:37

## 2023-05-04 NOTE — PROGRESS NOTES
Infusion given without incident  Patient offers no complaints on D/C  She is aware of her next apppt st [facility] infusion  AVS declined

## 2023-05-05 ENCOUNTER — HOSPITAL ENCOUNTER (OUTPATIENT)
Dept: INFUSION CENTER | Facility: HOSPITAL | Age: 48
End: 2023-05-05
Attending: INTERNAL MEDICINE

## 2023-05-05 ENCOUNTER — TELEPHONE (OUTPATIENT)
Dept: OTHER | Facility: OTHER | Age: 48
End: 2023-05-05

## 2023-05-05 VITALS
HEART RATE: 105 BPM | SYSTOLIC BLOOD PRESSURE: 124 MMHG | RESPIRATION RATE: 17 BRPM | TEMPERATURE: 98 F | OXYGEN SATURATION: 94 % | DIASTOLIC BLOOD PRESSURE: 92 MMHG

## 2023-05-05 DIAGNOSIS — R19.7 DIARRHEA, UNSPECIFIED TYPE: ICD-10-CM

## 2023-05-05 DIAGNOSIS — E44.1 MILD PROTEIN-CALORIE MALNUTRITION (HCC): ICD-10-CM

## 2023-05-05 DIAGNOSIS — R63.0 ANOREXIA: Primary | ICD-10-CM

## 2023-05-05 RX ORDER — METHYLPREDNISOLONE SODIUM SUCCINATE 125 MG/2ML
60 INJECTION, POWDER, LYOPHILIZED, FOR SOLUTION INTRAMUSCULAR; INTRAVENOUS ONCE
Status: CANCELLED
Start: 2023-05-08 | End: 2023-05-06

## 2023-05-05 RX ORDER — METHYLPREDNISOLONE SODIUM SUCCINATE 125 MG/2ML
60 INJECTION, POWDER, LYOPHILIZED, FOR SOLUTION INTRAMUSCULAR; INTRAVENOUS ONCE
Status: COMPLETED | OUTPATIENT
Start: 2023-05-05 | End: 2023-05-05

## 2023-05-05 RX ADMIN — DIPHENHYDRAMINE HYDROCHLORIDE 50 MG: 50 INJECTION, SOLUTION INTRAMUSCULAR; INTRAVENOUS at 13:10

## 2023-05-05 RX ADMIN — METHYLPREDNISOLONE SODIUM SUCCINATE 60 MG: 125 INJECTION, POWDER, FOR SOLUTION INTRAMUSCULAR; INTRAVENOUS at 13:05

## 2023-05-05 RX ADMIN — SODIUM CHLORIDE 1000 ML: 0.9 INJECTION, SOLUTION INTRAVENOUS at 12:22

## 2023-05-05 NOTE — TELEPHONE ENCOUNTER
[facility] is calling to see if patient is going to continue with infusion treatment or stop    Please f/u     CB# [phone] ext [phone]

## 2023-05-08 ENCOUNTER — HOSPITAL ENCOUNTER (OUTPATIENT)
Dept: INFUSION CENTER | Facility: HOSPITAL | Age: 48
Discharge: HOME | End: 2023-05-08
Attending: INTERNAL MEDICINE

## 2023-05-08 ENCOUNTER — TELEPHONE (OUTPATIENT)
Dept: FAMILY MEDICINE CLINIC | Facility: CLINIC | Age: 48
End: 2023-05-08

## 2023-05-08 VITALS
OXYGEN SATURATION: 96 % | RESPIRATION RATE: 17 BRPM | SYSTOLIC BLOOD PRESSURE: 112 MMHG | HEART RATE: 107 BPM | TEMPERATURE: 97.4 F | DIASTOLIC BLOOD PRESSURE: 72 MMHG

## 2023-05-08 DIAGNOSIS — L50.1 CHRONIC IDIOPATHIC URTICARIA: ICD-10-CM

## 2023-05-08 DIAGNOSIS — R63.0 ANOREXIA: Primary | ICD-10-CM

## 2023-05-08 DIAGNOSIS — E44.1 MILD PROTEIN-CALORIE MALNUTRITION (HCC): ICD-10-CM

## 2023-05-08 DIAGNOSIS — R19.7 DIARRHEA, UNSPECIFIED TYPE: ICD-10-CM

## 2023-05-08 RX ORDER — METHYLPREDNISOLONE SODIUM SUCCINATE 125 MG/2ML
60 INJECTION, POWDER, LYOPHILIZED, FOR SOLUTION INTRAMUSCULAR; INTRAVENOUS ONCE
Status: CANCELLED
Start: 2023-05-09 | End: 2023-05-09

## 2023-05-08 RX ORDER — METHYLPREDNISOLONE SODIUM SUCCINATE 125 MG/2ML
60 INJECTION, POWDER, LYOPHILIZED, FOR SOLUTION INTRAMUSCULAR; INTRAVENOUS ONCE
Status: COMPLETED | OUTPATIENT
Start: 2023-05-08 | End: 2023-05-08

## 2023-05-08 RX ORDER — EPINEPHRINE 1 MG/ML
0.3 INJECTION, SOLUTION, CONCENTRATE INTRAVENOUS
Status: DISCONTINUED | OUTPATIENT
Start: 2023-05-08 | End: 2023-05-11 | Stop reason: HOSPADM

## 2023-05-08 RX ORDER — EPINEPHRINE 1 MG/ML
0.3 INJECTION, SOLUTION, CONCENTRATE INTRAVENOUS
OUTPATIENT
Start: 2023-05-29

## 2023-05-08 RX ADMIN — SODIUM CHLORIDE 1000 ML: 0.9 INJECTION, SOLUTION INTRAVENOUS at 11:29

## 2023-05-08 RX ADMIN — DIPHENHYDRAMINE HYDROCHLORIDE 50 MG: 50 INJECTION, SOLUTION INTRAMUSCULAR; INTRAVENOUS at 12:09

## 2023-05-08 RX ADMIN — OMALIZUMAB 300 MG: 150 INJECTION, SOLUTION SUBCUTANEOUS at 14:18

## 2023-05-08 RX ADMIN — METHYLPREDNISOLONE SODIUM SUCCINATE 60 MG: 125 INJECTION, POWDER, FOR SOLUTION INTRAMUSCULAR; INTRAVENOUS at 12:01

## 2023-05-08 NOTE — TELEPHONE ENCOUNTER
Hi, this is Teddy Gambino calling  It's 2:30 on Monday  I'm just leaving the treatment center  I was calling to see if Doctor Johnny Russo was running ahead of schedule at all, not feeling the hottest, and just wanted to go home and go to bed  If he's running on time, my appointments not to 4:00 o'clock and I'm not sure I'm going to be up to running back out  So if you could please give me a call back when you get my message, [phone]  Thank you

## 2023-05-08 NOTE — PROGRESS NOTES
Pt tolerated infusion well  Slept most of the time  Pt tolerated injections well  Discharged in satisfactory condition

## 2023-05-10 ENCOUNTER — HOSPITAL ENCOUNTER (OUTPATIENT)
Dept: INFUSION CENTER | Facility: HOSPITAL | Age: 48
Discharge: HOME | End: 2023-05-10
Attending: INTERNAL MEDICINE

## 2023-05-10 NOTE — PROGRESS NOTES
Phone call received from pt cancelling her appointment for today  Stated she had a reaction to her Xolair injections on Monday  Stated when she got home she didn't feel good so she laid down for about an hour  When she awoke she felt like she had some trouble breathing  A little while later she took her epi pen  States she still doesn't feel good and that is why she is cancelling  Stated she will get in touch with her physicians regarding the xolair and Ilona Sheer and future injections

## 2023-05-12 ENCOUNTER — HOSPITAL ENCOUNTER (OUTPATIENT)
Dept: INFUSION CENTER | Facility: HOSPITAL | Age: 48
Discharge: HOME | End: 2023-05-12
Attending: INTERNAL MEDICINE

## 2023-05-12 VITALS — TEMPERATURE: 97.6 F

## 2023-05-12 DIAGNOSIS — E44.1 MILD PROTEIN-CALORIE MALNUTRITION (HCC): ICD-10-CM

## 2023-05-12 DIAGNOSIS — R63.0 ANOREXIA: Primary | ICD-10-CM

## 2023-05-12 DIAGNOSIS — R19.7 DIARRHEA, UNSPECIFIED TYPE: ICD-10-CM

## 2023-05-12 RX ORDER — PANTOPRAZOLE SODIUM 40 MG/10ML
40 INJECTION, POWDER, LYOPHILIZED, FOR SOLUTION INTRAVENOUS ONCE
Status: COMPLETED | OUTPATIENT
Start: 2023-05-12 | End: 2023-05-12

## 2023-05-12 RX ORDER — PANTOPRAZOLE SODIUM 40 MG/10ML
40 INJECTION, POWDER, LYOPHILIZED, FOR SOLUTION INTRAVENOUS ONCE
Status: CANCELLED
Start: 2023-05-13 | End: 2023-05-13

## 2023-05-12 RX ORDER — PANTOPRAZOLE SODIUM 40 MG/10ML
40 INJECTION, POWDER, LYOPHILIZED, FOR SOLUTION INTRAVENOUS ONCE
Status: CANCELLED
Start: 2023-05-12 | End: 2023-05-12

## 2023-05-12 RX ORDER — METHYLPREDNISOLONE SODIUM SUCCINATE 125 MG/2ML
60 INJECTION, POWDER, LYOPHILIZED, FOR SOLUTION INTRAMUSCULAR; INTRAVENOUS ONCE
Status: COMPLETED | OUTPATIENT
Start: 2023-05-12 | End: 2023-05-12

## 2023-05-12 RX ORDER — METHYLPREDNISOLONE SODIUM SUCCINATE 125 MG/2ML
60 INJECTION, POWDER, LYOPHILIZED, FOR SOLUTION INTRAMUSCULAR; INTRAVENOUS ONCE
Status: CANCELLED
Start: 2023-05-13 | End: 2023-05-13

## 2023-05-12 RX ADMIN — METHYLPREDNISOLONE SODIUM SUCCINATE 60 MG: 125 INJECTION, POWDER, FOR SOLUTION INTRAMUSCULAR; INTRAVENOUS at 14:14

## 2023-05-12 RX ADMIN — FAMOTIDINE 40 MG: 10 INJECTION INTRAVENOUS at 15:25

## 2023-05-12 RX ADMIN — SODIUM CHLORIDE 1000 ML: 0.9 INJECTION, SOLUTION INTRAVENOUS at 13:42

## 2023-05-12 RX ADMIN — PANTOPRAZOLE SODIUM 40 MG: 40 INJECTION, POWDER, FOR SOLUTION INTRAVENOUS at 15:11

## 2023-05-12 RX ADMIN — DIPHENHYDRAMINE HYDROCHLORIDE 50 MG: 50 INJECTION, SOLUTION INTRAMUSCULAR; INTRAVENOUS at 14:31

## 2023-05-12 NOTE — PLAN OF CARE
Problem: INFECTION - ADULT  Goal: Absence of fever/infection during neutropenic period  Description: INTERVENTIONS:  - Monitor WBC    Outcome: Progressing n/a

## 2023-05-15 ENCOUNTER — TELEPHONE (OUTPATIENT)
Dept: OTHER | Facility: OTHER | Age: 48
End: 2023-05-15

## 2023-05-15 ENCOUNTER — HOSPITAL ENCOUNTER (OUTPATIENT)
Dept: INFUSION CENTER | Facility: HOSPITAL | Age: 48
Discharge: HOME | End: 2023-05-15
Attending: INTERNAL MEDICINE

## 2023-05-15 VITALS — TEMPERATURE: 98 F

## 2023-05-15 DIAGNOSIS — R19.7 DIARRHEA, UNSPECIFIED TYPE: ICD-10-CM

## 2023-05-15 DIAGNOSIS — E44.1 MILD PROTEIN-CALORIE MALNUTRITION (HCC): ICD-10-CM

## 2023-05-15 DIAGNOSIS — R63.0 ANOREXIA: Primary | ICD-10-CM

## 2023-05-15 RX ORDER — METHYLPREDNISOLONE SODIUM SUCCINATE 125 MG/2ML
60 INJECTION, POWDER, LYOPHILIZED, FOR SOLUTION INTRAMUSCULAR; INTRAVENOUS ONCE
Status: CANCELLED
Start: 2023-05-16 | End: 2023-05-16

## 2023-05-15 RX ORDER — METHYLPREDNISOLONE SODIUM SUCCINATE 125 MG/2ML
60 INJECTION, POWDER, LYOPHILIZED, FOR SOLUTION INTRAMUSCULAR; INTRAVENOUS ONCE
Status: COMPLETED | OUTPATIENT
Start: 2023-05-15 | End: 2023-05-15

## 2023-05-15 RX ORDER — PANTOPRAZOLE SODIUM 40 MG/10ML
40 INJECTION, POWDER, LYOPHILIZED, FOR SOLUTION INTRAVENOUS ONCE
Status: COMPLETED | OUTPATIENT
Start: 2023-05-15 | End: 2023-05-15

## 2023-05-15 RX ORDER — PANTOPRAZOLE SODIUM 40 MG/10ML
40 INJECTION, POWDER, LYOPHILIZED, FOR SOLUTION INTRAVENOUS ONCE
Status: CANCELLED
Start: 2023-05-16 | End: 2023-05-16

## 2023-05-15 RX ADMIN — PANTOPRAZOLE SODIUM 40 MG: 40 INJECTION, POWDER, FOR SOLUTION INTRAVENOUS at 14:36

## 2023-05-15 RX ADMIN — DIPHENHYDRAMINE HYDROCHLORIDE 50 MG: 50 INJECTION, SOLUTION INTRAMUSCULAR; INTRAVENOUS at 13:21

## 2023-05-15 RX ADMIN — SODIUM CHLORIDE 1000 ML: 0.9 INJECTION, SOLUTION INTRAVENOUS at 12:20

## 2023-05-15 RX ADMIN — FAMOTIDINE 40 MG: 10 INJECTION INTRAVENOUS at 14:02

## 2023-05-15 RX ADMIN — METHYLPREDNISOLONE SODIUM SUCCINATE 60 MG: 125 INJECTION, POWDER, FOR SOLUTION INTRAMUSCULAR; INTRAVENOUS at 13:16

## 2023-05-16 NOTE — TELEPHONE ENCOUNTER
Please see 2/24/2023 encounter  Letter mailed to patient 3/14/2023 after 3 phone calls without a return call from patient to schedule  Dr Lor Oleary, will patient need a return office visit prior to scheduling as she was last seen 2/15/2023 or can surgery order be replaced?

## 2023-05-17 ENCOUNTER — HOSPITAL ENCOUNTER (OUTPATIENT)
Dept: INFUSION CENTER | Facility: HOSPITAL | Age: 48
Discharge: HOME | End: 2023-05-17
Attending: INTERNAL MEDICINE

## 2023-05-17 VITALS
DIASTOLIC BLOOD PRESSURE: 71 MMHG | TEMPERATURE: 97.7 F | HEART RATE: 113 BPM | RESPIRATION RATE: 18 BRPM | OXYGEN SATURATION: 96 % | SYSTOLIC BLOOD PRESSURE: 107 MMHG

## 2023-05-17 DIAGNOSIS — R63.0 ANOREXIA: Primary | ICD-10-CM

## 2023-05-17 DIAGNOSIS — E44.1 MILD PROTEIN-CALORIE MALNUTRITION (HCC): ICD-10-CM

## 2023-05-17 DIAGNOSIS — R19.7 DIARRHEA, UNSPECIFIED TYPE: ICD-10-CM

## 2023-05-17 RX ORDER — PANTOPRAZOLE SODIUM 40 MG/10ML
40 INJECTION, POWDER, LYOPHILIZED, FOR SOLUTION INTRAVENOUS ONCE
Status: CANCELLED
Start: 2023-05-19 | End: 2023-05-18

## 2023-05-17 RX ORDER — METHYLPREDNISOLONE SODIUM SUCCINATE 125 MG/2ML
60 INJECTION, POWDER, LYOPHILIZED, FOR SOLUTION INTRAMUSCULAR; INTRAVENOUS ONCE
Status: CANCELLED
Start: 2023-05-19 | End: 2023-05-18

## 2023-05-17 RX ORDER — METHYLPREDNISOLONE SODIUM SUCCINATE 125 MG/2ML
60 INJECTION, POWDER, LYOPHILIZED, FOR SOLUTION INTRAMUSCULAR; INTRAVENOUS ONCE
Status: COMPLETED | OUTPATIENT
Start: 2023-05-17 | End: 2023-05-17

## 2023-05-17 RX ORDER — PANTOPRAZOLE SODIUM 40 MG/10ML
40 INJECTION, POWDER, LYOPHILIZED, FOR SOLUTION INTRAVENOUS ONCE
Status: COMPLETED | OUTPATIENT
Start: 2023-05-17 | End: 2023-05-17

## 2023-05-17 RX ADMIN — METHYLPREDNISOLONE SODIUM SUCCINATE 60 MG: 125 INJECTION, POWDER, FOR SOLUTION INTRAMUSCULAR; INTRAVENOUS at 10:27

## 2023-05-17 RX ADMIN — FAMOTIDINE 40 MG: 10 INJECTION INTRAVENOUS at 11:43

## 2023-05-17 RX ADMIN — PANTOPRAZOLE SODIUM 40 MG: 40 INJECTION, POWDER, FOR SOLUTION INTRAVENOUS at 11:21

## 2023-05-17 RX ADMIN — SODIUM CHLORIDE 1000 ML: 0.9 INJECTION, SOLUTION INTRAVENOUS at 10:12

## 2023-05-17 RX ADMIN — DIPHENHYDRAMINE HYDROCHLORIDE 50 MG: 50 INJECTION, SOLUTION INTRAMUSCULAR; INTRAVENOUS at 10:31

## 2023-05-18 ENCOUNTER — HOSPITAL ENCOUNTER (OUTPATIENT)
Dept: INFUSION CENTER | Facility: HOSPITAL | Age: 48
Discharge: HOME | End: 2023-05-18

## 2023-05-18 ENCOUNTER — TELEPHONE (OUTPATIENT)
Dept: GASTROENTEROLOGY | Facility: MEDICAL CENTER | Age: 48
End: 2023-05-18

## 2023-05-18 VITALS
DIASTOLIC BLOOD PRESSURE: 74 MMHG | OXYGEN SATURATION: 96 % | HEART RATE: 106 BPM | SYSTOLIC BLOOD PRESSURE: 117 MMHG | TEMPERATURE: 97.2 F | RESPIRATION RATE: 17 BRPM

## 2023-05-18 DIAGNOSIS — E44.1 MILD PROTEIN-CALORIE MALNUTRITION (HCC): ICD-10-CM

## 2023-05-18 DIAGNOSIS — R63.0 ANOREXIA: Primary | ICD-10-CM

## 2023-05-18 DIAGNOSIS — R19.7 DIARRHEA, UNSPECIFIED TYPE: ICD-10-CM

## 2023-05-18 RX ORDER — METHYLPREDNISOLONE SODIUM SUCCINATE 125 MG/2ML
60 INJECTION, POWDER, LYOPHILIZED, FOR SOLUTION INTRAMUSCULAR; INTRAVENOUS ONCE
Status: COMPLETED | OUTPATIENT
Start: 2023-05-18 | End: 2023-05-18

## 2023-05-18 RX ORDER — PANTOPRAZOLE SODIUM 40 MG/10ML
40 INJECTION, POWDER, LYOPHILIZED, FOR SOLUTION INTRAVENOUS ONCE
Status: CANCELLED
Start: 2023-05-19 | End: 2023-05-19

## 2023-05-18 RX ORDER — METHYLPREDNISOLONE SODIUM SUCCINATE 125 MG/2ML
60 INJECTION, POWDER, LYOPHILIZED, FOR SOLUTION INTRAMUSCULAR; INTRAVENOUS ONCE
Status: CANCELLED
Start: 2023-05-19 | End: 2023-05-19

## 2023-05-18 RX ORDER — PANTOPRAZOLE SODIUM 40 MG/10ML
40 INJECTION, POWDER, LYOPHILIZED, FOR SOLUTION INTRAVENOUS ONCE
Status: COMPLETED | OUTPATIENT
Start: 2023-05-18 | End: 2023-05-18

## 2023-05-18 RX ADMIN — SODIUM CHLORIDE 500 ML: 0.9 INJECTION, SOLUTION INTRAVENOUS at 13:32

## 2023-05-18 RX ADMIN — METHYLPREDNISOLONE SODIUM SUCCINATE 60 MG: 125 INJECTION, POWDER, FOR SOLUTION INTRAMUSCULAR; INTRAVENOUS at 11:11

## 2023-05-18 RX ADMIN — FAMOTIDINE 40 MG: 10 INJECTION, SOLUTION INTRAVENOUS at 12:32

## 2023-05-18 RX ADMIN — DIPHENHYDRAMINE HYDROCHLORIDE 50 MG: 50 INJECTION, SOLUTION INTRAMUSCULAR; INTRAVENOUS at 11:22

## 2023-05-18 RX ADMIN — SODIUM CHLORIDE 1000 ML: 0.9 INJECTION, SOLUTION INTRAVENOUS at 10:35

## 2023-05-18 RX ADMIN — PANTOPRAZOLE SODIUM 40 MG: 40 INJECTION, POWDER, FOR SOLUTION INTRAVENOUS at 12:25

## 2023-05-18 NOTE — TELEPHONE ENCOUNTER
Patient called stating that she needs an order for additional hydration 100 mg saline  Patient states that she is currently at [facility]

## 2023-05-19 ENCOUNTER — HOSPITAL ENCOUNTER (OUTPATIENT)
Dept: INFUSION CENTER | Facility: HOSPITAL | Age: 48
End: 2023-05-19
Attending: INTERNAL MEDICINE

## 2023-05-19 ENCOUNTER — TELEPHONE (OUTPATIENT)
Dept: GASTROENTEROLOGY | Facility: CLINIC | Age: 48
End: 2023-05-19

## 2023-05-19 DIAGNOSIS — R63.0 ANOREXIA: Primary | ICD-10-CM

## 2023-05-19 DIAGNOSIS — K50.119 CROHN'S DISEASE OF COLON WITH COMPLICATION (HCC): ICD-10-CM

## 2023-05-19 DIAGNOSIS — E44.1 MILD PROTEIN-CALORIE MALNUTRITION (HCC): ICD-10-CM

## 2023-05-19 DIAGNOSIS — R19.7 DIARRHEA, UNSPECIFIED TYPE: ICD-10-CM

## 2023-05-19 RX ORDER — PANTOPRAZOLE SODIUM 40 MG/10ML
40 INJECTION, POWDER, LYOPHILIZED, FOR SOLUTION INTRAVENOUS ONCE
Start: 2023-05-23 | End: 2023-05-20

## 2023-05-19 RX ORDER — METHYLPREDNISOLONE SODIUM SUCCINATE 125 MG/2ML
60 INJECTION, POWDER, LYOPHILIZED, FOR SOLUTION INTRAMUSCULAR; INTRAVENOUS ONCE
Start: 2023-05-23 | End: 2023-05-20

## 2023-05-19 RX ORDER — METHYLPREDNISOLONE SODIUM SUCCINATE 125 MG/2ML
60 INJECTION, POWDER, LYOPHILIZED, FOR SOLUTION INTRAMUSCULAR; INTRAVENOUS ONCE
Status: COMPLETED | OUTPATIENT
Start: 2023-05-19 | End: 2023-05-19

## 2023-05-19 RX ORDER — PANTOPRAZOLE SODIUM 40 MG/10ML
40 INJECTION, POWDER, LYOPHILIZED, FOR SOLUTION INTRAVENOUS ONCE
Status: COMPLETED | OUTPATIENT
Start: 2023-05-19 | End: 2023-05-19

## 2023-05-19 RX ADMIN — PANTOPRAZOLE SODIUM 40 MG: 40 INJECTION, POWDER, FOR SOLUTION INTRAVENOUS at 11:08

## 2023-05-19 RX ADMIN — DIPHENHYDRAMINE HYDROCHLORIDE 50 MG: 50 INJECTION, SOLUTION INTRAMUSCULAR; INTRAVENOUS at 10:42

## 2023-05-19 RX ADMIN — METHYLPREDNISOLONE SODIUM SUCCINATE 60 MG: 125 INJECTION, POWDER, FOR SOLUTION INTRAMUSCULAR; INTRAVENOUS at 13:02

## 2023-05-19 RX ADMIN — FAMOTIDINE 40 MG: 10 INJECTION, SOLUTION INTRAVENOUS at 11:14

## 2023-05-19 RX ADMIN — SODIUM CHLORIDE 1500 ML: 0.9 INJECTION, SOLUTION INTRAVENOUS at 10:20

## 2023-05-19 NOTE — TELEPHONE ENCOUNTER
Called patient twice but no answer  Voicemail full and can not leave a message   Will send a [facility] message

## 2023-05-19 NOTE — TELEPHONE ENCOUNTER
Patient staying the infusion center recommend her to hold her stelera, please advise patient when you have a moment   Thanks

## 2023-05-22 DIAGNOSIS — D89.40 MAST CELL ACTIVATION SYNDROME (HCC): ICD-10-CM

## 2023-05-22 RX ORDER — PREDNISONE 20 MG/1
TABLET ORAL
Qty: 100 TABLET | Refills: 0 | Status: ON HOLD | OUTPATIENT
Start: 2023-05-22

## 2023-05-23 RX ORDER — LEVALBUTEROL TARTRATE 45 UG/1
1-2 AEROSOL, METERED ORAL EVERY 4 HOURS PRN
Qty: 45 G | Refills: 2 | Status: ON HOLD | OUTPATIENT
Start: 2023-05-23

## 2023-05-24 ENCOUNTER — HOSPITAL ENCOUNTER (OUTPATIENT)
Dept: INFUSION CENTER | Facility: HOSPITAL | Age: 48
End: 2023-05-24
Attending: INTERNAL MEDICINE

## 2023-05-29 ENCOUNTER — APPOINTMENT (INPATIENT)
Dept: CT IMAGING | Facility: HOSPITAL | Age: 48
End: 2023-05-29
Payer: COMMERCIAL

## 2023-05-29 ENCOUNTER — HOSPITAL ENCOUNTER (INPATIENT)
Facility: HOSPITAL | Age: 48
LOS: 15 days | Discharge: HOME | End: 2023-06-13
Attending: EMERGENCY MEDICINE | Admitting: INTERNAL MEDICINE
Payer: COMMERCIAL

## 2023-05-29 DIAGNOSIS — R56.9 SEIZURE (HCC): ICD-10-CM

## 2023-05-29 DIAGNOSIS — K50.119 CROHN'S DISEASE OF COLON WITH COMPLICATION (HCC): ICD-10-CM

## 2023-05-29 DIAGNOSIS — K50.10 CROHN'S COLITIS (HCC): ICD-10-CM

## 2023-05-29 DIAGNOSIS — R19.7 NAUSEA VOMITING AND DIARRHEA: Primary | ICD-10-CM

## 2023-05-29 DIAGNOSIS — R31.9 HEMATURIA: ICD-10-CM

## 2023-05-29 DIAGNOSIS — E03.9 HYPOTHYROID: ICD-10-CM

## 2023-05-29 DIAGNOSIS — D89.40 MAST CELL ACTIVATION SYNDROME (HCC): ICD-10-CM

## 2023-05-29 DIAGNOSIS — R31.9 HEMATURIA, UNSPECIFIED TYPE: ICD-10-CM

## 2023-05-29 DIAGNOSIS — R11.2 NAUSEA VOMITING AND DIARRHEA: Primary | ICD-10-CM

## 2023-05-29 PROBLEM — R63.8 DECREASED ORAL INTAKE: Status: ACTIVE | Noted: 2023-05-29

## 2023-05-29 PROBLEM — R11.10 VOMITING AND DIARRHEA: Status: ACTIVE | Noted: 2017-08-25

## 2023-05-29 LAB
ALBUMIN SERPL BCP-MCNC: 3.6 G/DL (ref 3.5–5)
ALP SERPL-CCNC: 61 U/L (ref 34–104)
ALT SERPL W P-5'-P-CCNC: 10 U/L (ref 7–52)
ANION GAP SERPL CALCULATED.3IONS-SCNC: 11 MMOL/L (ref 4–13)
ANION GAP SERPL CALCULATED.3IONS-SCNC: 13 MMOL/L (ref 4–13)
AST SERPL W P-5'-P-CCNC: 12 U/L (ref 13–39)
BASOPHILS # BLD AUTO: 0.04 THOUSANDS/ÂΜL (ref 0–0.1)
BASOPHILS NFR BLD AUTO: 0 % (ref 0–1)
BILIRUB SERPL-MCNC: 0.57 MG/DL (ref 0.2–1)
BUN SERPL-MCNC: 7 MG/DL (ref 5–25)
BUN SERPL-MCNC: 9 MG/DL (ref 5–25)
CALCIUM SERPL-MCNC: 8.9 MG/DL (ref 8.4–10.2)
CALCIUM SERPL-MCNC: 9.2 MG/DL (ref 8.4–10.2)
CHLORIDE SERPL-SCNC: 100 MMOL/L (ref 96–108)
CHLORIDE SERPL-SCNC: 104 MMOL/L (ref 96–108)
CO2 SERPL-SCNC: 20 MMOL/L (ref 21–32)
CO2 SERPL-SCNC: 22 MMOL/L (ref 21–32)
CREAT SERPL-MCNC: 0.78 MG/DL (ref 0.6–1.3)
CREAT SERPL-MCNC: 0.85 MG/DL (ref 0.6–1.3)
EOSINOPHIL # BLD AUTO: 0.01 THOUSAND/ÂΜL (ref 0–0.61)
EOSINOPHIL NFR BLD AUTO: 0 % (ref 0–6)
ERYTHROCYTE [DISTWIDTH] IN BLOOD BY AUTOMATED COUNT: 13.7 % (ref 11.6–15.1)
GFR SERPL CREATININE-BSD FRML MDRD: 80 ML/MIN/1.73SQ M
GFR SERPL CREATININE-BSD FRML MDRD: 88 ML/MIN/1.73SQ M
GLUCOSE P FAST SERPL-MCNC: 186 MG/DL (ref 65–99)
GLUCOSE SERPL-MCNC: 186 MG/DL (ref 65–140)
GLUCOSE SERPL-MCNC: 207 MG/DL (ref 65–140)
HCT VFR BLD AUTO: 44.8 % (ref 34.8–46.1)
HGB BLD-MCNC: 15.1 G/DL (ref 11.5–15.4)
IMM GRANULOCYTES # BLD AUTO: 0.07 THOUSAND/UL (ref 0–0.2)
IMM GRANULOCYTES NFR BLD AUTO: 1 % (ref 0–2)
LIPASE SERPL-CCNC: <6 U/L (ref 11–82)
LYMPHOCYTES # BLD AUTO: 0.73 THOUSANDS/ÂΜL (ref 0.6–4.47)
LYMPHOCYTES NFR BLD AUTO: 6 % (ref 14–44)
MCH RBC QN AUTO: 30.8 PG (ref 26.8–34.3)
MCHC RBC AUTO-ENTMCNC: 33.7 G/DL (ref 31.4–37.4)
MCV RBC AUTO: 91 FL (ref 82–98)
MONOCYTES # BLD AUTO: 0.07 THOUSAND/ÂΜL (ref 0.17–1.22)
MONOCYTES NFR BLD AUTO: 1 % (ref 4–12)
NEUTROPHILS # BLD AUTO: 12.33 THOUSANDS/ÂΜL (ref 1.85–7.62)
NEUTS SEG NFR BLD AUTO: 92 % (ref 43–75)
NRBC BLD AUTO-RTO: 0 /100 WBCS
PLATELET # BLD AUTO: 424 THOUSANDS/UL (ref 149–390)
PMV BLD AUTO: 8.6 FL (ref 8.9–12.7)
POTASSIUM SERPL-SCNC: 3.6 MMOL/L (ref 3.5–5.3)
POTASSIUM SERPL-SCNC: 4.5 MMOL/L (ref 3.5–5.3)
PROT SERPL-MCNC: 6.5 G/DL (ref 6.4–8.4)
RBC # BLD AUTO: 4.91 MILLION/UL (ref 3.81–5.12)
SODIUM SERPL-SCNC: 133 MMOL/L (ref 135–147)
SODIUM SERPL-SCNC: 137 MMOL/L (ref 135–147)
TSH SERPL DL<=0.05 MIU/L-ACNC: 1.89 UIU/ML (ref 0.45–4.5)
WBC # BLD AUTO: 13.25 THOUSAND/UL (ref 4.31–10.16)

## 2023-05-29 PROCEDURE — 99285 EMERGENCY DEPT VISIT HI MDM: CPT

## 2023-05-29 PROCEDURE — 96375 TX/PRO/DX INJ NEW DRUG ADDON: CPT

## 2023-05-29 PROCEDURE — 87505 NFCT AGENT DETECTION GI: CPT | Performed by: INTERNAL MEDICINE

## 2023-05-29 PROCEDURE — 80053 COMPREHEN METABOLIC PANEL: CPT | Performed by: EMERGENCY MEDICINE

## 2023-05-29 PROCEDURE — G1004 CDSM NDSC: HCPCS

## 2023-05-29 PROCEDURE — 85025 COMPLETE CBC W/AUTO DIFF WBC: CPT | Performed by: EMERGENCY MEDICINE

## 2023-05-29 PROCEDURE — 87493 C DIFF AMPLIFIED PROBE: CPT | Performed by: STUDENT IN AN ORGANIZED HEALTH CARE EDUCATION/TRAINING PROGRAM

## 2023-05-29 PROCEDURE — 96361 HYDRATE IV INFUSION ADD-ON: CPT

## 2023-05-29 PROCEDURE — 84443 ASSAY THYROID STIM HORMONE: CPT | Performed by: EMERGENCY MEDICINE

## 2023-05-29 PROCEDURE — 99284 EMERGENCY DEPT VISIT MOD MDM: CPT | Performed by: EMERGENCY MEDICINE

## 2023-05-29 PROCEDURE — 96374 THER/PROPH/DIAG INJ IV PUSH: CPT

## 2023-05-29 PROCEDURE — 83690 ASSAY OF LIPASE: CPT | Performed by: EMERGENCY MEDICINE

## 2023-05-29 PROCEDURE — 36415 COLL VENOUS BLD VENIPUNCTURE: CPT | Performed by: EMERGENCY MEDICINE

## 2023-05-29 PROCEDURE — 80048 BASIC METABOLIC PNL TOTAL CA: CPT | Performed by: STUDENT IN AN ORGANIZED HEALTH CARE EDUCATION/TRAINING PROGRAM

## 2023-05-29 PROCEDURE — 99223 1ST HOSP IP/OBS HIGH 75: CPT | Performed by: STUDENT IN AN ORGANIZED HEALTH CARE EDUCATION/TRAINING PROGRAM

## 2023-05-29 PROCEDURE — 70450 CT HEAD/BRAIN W/O DYE: CPT

## 2023-05-29 RX ORDER — DIPHENHYDRAMINE HCL 25 MG
50 TABLET ORAL 2 TIMES DAILY
Status: DISCONTINUED | OUTPATIENT
Start: 2023-05-29 | End: 2023-05-29

## 2023-05-29 RX ORDER — ONDANSETRON 4 MG/1
4 TABLET, ORALLY DISINTEGRATING ORAL EVERY 6 HOURS PRN
Status: DISCONTINUED | OUTPATIENT
Start: 2023-05-29 | End: 2023-05-29

## 2023-05-29 RX ORDER — LEVOTHYROXINE SODIUM 0.1 MG/1
100 TABLET ORAL
Status: DISCONTINUED | OUTPATIENT
Start: 2023-05-29 | End: 2023-06-10

## 2023-05-29 RX ORDER — LACTOBACILLUS RHAMNOSUS GG 10B CELL
CAPSULE ORAL DAILY
Status: DISCONTINUED | OUTPATIENT
Start: 2023-05-29 | End: 2023-05-29

## 2023-05-29 RX ORDER — PANTOPRAZOLE SODIUM 40 MG/10ML
40 INJECTION, POWDER, LYOPHILIZED, FOR SOLUTION INTRAVENOUS EVERY 12 HOURS SCHEDULED
Status: DISCONTINUED | OUTPATIENT
Start: 2023-05-29 | End: 2023-05-29

## 2023-05-29 RX ORDER — ALBUTEROL SULFATE 90 UG/1
2 AEROSOL, METERED RESPIRATORY (INHALATION) EVERY 4 HOURS PRN
Status: DISCONTINUED | OUTPATIENT
Start: 2023-05-29 | End: 2023-06-13 | Stop reason: HOSPADM

## 2023-05-29 RX ORDER — BUTALBITAL, ACETAMINOPHEN AND CAFFEINE 50; 325; 40 MG/1; MG/1; MG/1
1 TABLET ORAL EVERY 8 HOURS PRN
Status: DISCONTINUED | OUTPATIENT
Start: 2023-05-29 | End: 2023-06-13 | Stop reason: HOSPADM

## 2023-05-29 RX ORDER — SODIUM CHLORIDE 9 MG/ML
100 INJECTION, SOLUTION INTRAVENOUS CONTINUOUS
Status: DISCONTINUED | OUTPATIENT
Start: 2023-05-29 | End: 2023-06-02

## 2023-05-29 RX ORDER — METHYLPREDNISOLONE SODIUM SUCCINATE 40 MG/ML
40 INJECTION, POWDER, LYOPHILIZED, FOR SOLUTION INTRAMUSCULAR; INTRAVENOUS DAILY
Status: DISCONTINUED | OUTPATIENT
Start: 2023-05-30 | End: 2023-05-30

## 2023-05-29 RX ORDER — HYDROMORPHONE HCL/PF 1 MG/ML
0.5 SYRINGE (ML) INJECTION EVERY 4 HOURS PRN
Status: DISCONTINUED | OUTPATIENT
Start: 2023-05-29 | End: 2023-05-31

## 2023-05-29 RX ORDER — DIPHENHYDRAMINE HCL 25 MG
50 TABLET ORAL
Status: DISCONTINUED | OUTPATIENT
Start: 2023-05-29 | End: 2023-05-29

## 2023-05-29 RX ORDER — LORAZEPAM 2 MG/ML
1 INJECTION INTRAMUSCULAR EVERY 4 HOURS PRN
Status: DISCONTINUED | OUTPATIENT
Start: 2023-05-29 | End: 2023-06-13 | Stop reason: HOSPADM

## 2023-05-29 RX ORDER — BUDESONIDE 3 MG/1
3 CAPSULE, COATED PELLETS ORAL 3 TIMES DAILY
Status: DISCONTINUED | OUTPATIENT
Start: 2023-05-29 | End: 2023-06-02

## 2023-05-29 RX ORDER — ONDANSETRON 2 MG/ML
4 INJECTION INTRAMUSCULAR; INTRAVENOUS EVERY 6 HOURS PRN
Status: DISCONTINUED | OUTPATIENT
Start: 2023-05-29 | End: 2023-06-13 | Stop reason: HOSPADM

## 2023-05-29 RX ORDER — ONDANSETRON 2 MG/ML
4 INJECTION INTRAMUSCULAR; INTRAVENOUS ONCE
Status: COMPLETED | OUTPATIENT
Start: 2023-05-29 | End: 2023-05-29

## 2023-05-29 RX ORDER — ENOXAPARIN SODIUM 100 MG/ML
40 INJECTION SUBCUTANEOUS DAILY
Status: DISCONTINUED | OUTPATIENT
Start: 2023-05-29 | End: 2023-06-03

## 2023-05-29 RX ORDER — ACETAMINOPHEN 325 MG/1
650 TABLET ORAL EVERY 6 HOURS PRN
Status: DISCONTINUED | OUTPATIENT
Start: 2023-05-29 | End: 2023-06-13 | Stop reason: HOSPADM

## 2023-05-29 RX ORDER — PANTOPRAZOLE SODIUM 40 MG/1
40 TABLET, DELAYED RELEASE ORAL
Status: DISCONTINUED | OUTPATIENT
Start: 2023-05-29 | End: 2023-05-29

## 2023-05-29 RX ORDER — DIPHENHYDRAMINE HYDROCHLORIDE 50 MG/ML
50 INJECTION INTRAMUSCULAR; INTRAVENOUS EVERY 6 HOURS
Status: DISCONTINUED | OUTPATIENT
Start: 2023-05-29 | End: 2023-06-13 | Stop reason: HOSPADM

## 2023-05-29 RX ORDER — FAMOTIDINE 10 MG/ML
20 INJECTION, SOLUTION INTRAVENOUS EVERY 12 HOURS SCHEDULED
Status: DISCONTINUED | OUTPATIENT
Start: 2023-05-29 | End: 2023-05-31

## 2023-05-29 RX ORDER — METHYLPREDNISOLONE SODIUM SUCCINATE 125 MG/2ML
60 INJECTION, POWDER, LYOPHILIZED, FOR SOLUTION INTRAMUSCULAR; INTRAVENOUS ONCE
Status: COMPLETED | OUTPATIENT
Start: 2023-05-29 | End: 2023-05-29

## 2023-05-29 RX ORDER — DIPHENHYDRAMINE HYDROCHLORIDE 50 MG/ML
50 INJECTION INTRAMUSCULAR; INTRAVENOUS EVERY 6 HOURS PRN
Status: DISCONTINUED | OUTPATIENT
Start: 2023-05-29 | End: 2023-05-29

## 2023-05-29 RX ORDER — LORAZEPAM 1 MG/1
1 TABLET ORAL EVERY 8 HOURS PRN
Status: DISCONTINUED | OUTPATIENT
Start: 2023-05-29 | End: 2023-05-29

## 2023-05-29 RX ORDER — LOPERAMIDE HYDROCHLORIDE 2 MG/1
2 CAPSULE ORAL 3 TIMES DAILY PRN
Status: DISCONTINUED | OUTPATIENT
Start: 2023-05-29 | End: 2023-05-29

## 2023-05-29 RX ORDER — LORAZEPAM 2 MG/ML
INJECTION INTRAMUSCULAR
Status: COMPLETED
Start: 2023-05-29 | End: 2023-05-29

## 2023-05-29 RX ORDER — LORAZEPAM 2 MG/ML
1 INJECTION INTRAMUSCULAR EVERY 8 HOURS PRN
Status: DISCONTINUED | OUTPATIENT
Start: 2023-05-29 | End: 2023-05-29

## 2023-05-29 RX ORDER — CHOLECALCIFEROL (VITAMIN D3) 10(400)/ML
1000 DROPS ORAL 2 TIMES DAILY
Status: DISCONTINUED | OUTPATIENT
Start: 2023-05-29 | End: 2023-06-09

## 2023-05-29 RX ORDER — METHYLPREDNISOLONE SODIUM SUCCINATE 40 MG/ML
40 INJECTION, POWDER, LYOPHILIZED, FOR SOLUTION INTRAMUSCULAR; INTRAVENOUS ONCE
Status: COMPLETED | OUTPATIENT
Start: 2023-05-29 | End: 2023-05-29

## 2023-05-29 RX ORDER — PREDNISONE 20 MG/1
20 TABLET ORAL DAILY PRN
Status: DISCONTINUED | OUTPATIENT
Start: 2023-05-29 | End: 2023-06-13 | Stop reason: HOSPADM

## 2023-05-29 RX ADMIN — SODIUM CHLORIDE 1500 ML: 0.9 INJECTION, SOLUTION INTRAVENOUS at 02:58

## 2023-05-29 RX ADMIN — ONDANSETRON 4 MG: 2 INJECTION INTRAMUSCULAR; INTRAVENOUS at 02:56

## 2023-05-29 RX ADMIN — METHYLPREDNISOLONE SODIUM SUCCINATE 40 MG: 40 INJECTION, POWDER, FOR SOLUTION INTRAMUSCULAR; INTRAVENOUS at 10:15

## 2023-05-29 RX ADMIN — DIPHENHYDRAMINE HYDROCHLORIDE 50 MG: 50 INJECTION, SOLUTION INTRAMUSCULAR; INTRAVENOUS at 12:02

## 2023-05-29 RX ADMIN — LORAZEPAM 1 MG: 2 INJECTION INTRAMUSCULAR; INTRAVENOUS at 16:47

## 2023-05-29 RX ADMIN — DIPHENHYDRAMINE HYDROCHLORIDE 50 MG: 50 INJECTION, SOLUTION INTRAMUSCULAR; INTRAVENOUS at 18:19

## 2023-05-29 RX ADMIN — LORAZEPAM 1 MG: 2 INJECTION INTRAMUSCULAR; INTRAVENOUS at 23:36

## 2023-05-29 RX ADMIN — LORAZEPAM 1 MG: 2 INJECTION INTRAMUSCULAR; INTRAVENOUS at 10:15

## 2023-05-29 RX ADMIN — FAMOTIDINE 20 MG: 10 INJECTION, SOLUTION INTRAVENOUS at 23:18

## 2023-05-29 RX ADMIN — METHYLPREDNISOLONE SODIUM SUCCINATE 60 MG: 125 INJECTION, POWDER, FOR SOLUTION INTRAMUSCULAR; INTRAVENOUS at 04:53

## 2023-05-29 RX ADMIN — SODIUM CHLORIDE 100 ML/HR: 0.9 INJECTION, SOLUTION INTRAVENOUS at 06:49

## 2023-05-29 RX ADMIN — SODIUM CHLORIDE 100 ML/HR: 0.9 INJECTION, SOLUTION INTRAVENOUS at 16:50

## 2023-05-29 RX ADMIN — HYDROMORPHONE HYDROCHLORIDE 0.5 MG: 1 INJECTION, SOLUTION INTRAMUSCULAR; INTRAVENOUS; SUBCUTANEOUS at 13:28

## 2023-05-29 RX ADMIN — DIPHENHYDRAMINE HYDROCHLORIDE 50 MG: 50 INJECTION, SOLUTION INTRAMUSCULAR; INTRAVENOUS at 23:19

## 2023-05-29 RX ADMIN — ONDANSETRON 4 MG: 2 INJECTION INTRAMUSCULAR; INTRAVENOUS at 16:47

## 2023-05-29 RX ADMIN — FAMOTIDINE 20 MG: 10 INJECTION, SOLUTION INTRAVENOUS at 09:46

## 2023-05-29 NOTE — ED NOTES
Patient requested to speak with the nursing supervisor about her care  Call placed at this time        Prasad Olsen RN  05/29/23 [id number]

## 2023-05-29 NOTE — ED NOTES
Pt rang call godoy to inform staff she was actively having a seizure and short of breath  Pt SPO2 reading 78%, pulse ox probe changed to new sensor, Pt 94% on room air  Pt connected to cardiac monitor, call bell in reach  Provider at bedside to assess patient  Orders to follow        Sully Ford RN  05/29/23 1365

## 2023-05-29 NOTE — ED NOTES
Pt refusing all PO medications  IV Pepcid administered  Pt communicates that she need IV epinephrine and benedryl  Pt refusing to answer all other questions  Pt would like provider at bedside  Per registration pt's has a spouse in ED waiting room waiting to visit  Pt has not given permission for spouse to visit at this time            Opal Krause RN  05/31/23 3055

## 2023-05-29 NOTE — PLAN OF CARE
Problem: MOBILITY - ADULT  Goal: Maintain or return to baseline ADL function  Description: INTERVENTIONS:  -  Assess patient's ability to carry out ADLs; assess patient's baseline for ADL function and identify physical deficits which impact ability to perform ADLs (bathing, care of mouth/teeth, toileting, grooming, dressing, etc )  - Assess/evaluate cause of self-care deficits   - Assess range of motion  - Assess patient's mobility; develop plan if impaired  - Assess patient's need for assistive devices and provide as appropriate  - Encourage maximum independence but intervene and supervise when necessary  - Involve family in performance of ADLs  - Assess for home care needs following discharge   - Consider OT consult to assist with ADL evaluation and planning for discharge  - Provide patient education as appropriate  Outcome: Progressing  Goal: Maintains/Returns to pre admission functional level  Description: INTERVENTIONS:  - Perform BMAT or MOVE assessment daily    - Set and communicate daily mobility goal to care team and patient/family/caregiver  - Collaborate with rehabilitation services on mobility goals if consulted  - Perform Range of Motion 3-4 times a day  - Reposition patient every 2 hours    - Dangle patient 3 times a day  - Stand patient 3 times a day  - Ambulate patient 3 times a day  - Out of bed to chair 3 times a day   - Out of bed for meals 3 times a day  - Out of bed for toileting  - Record patient progress and toleration of activity level   Outcome: Progressing     Problem: GASTROINTESTINAL - ADULT  Goal: Minimal or absence of nausea and/or vomiting  Description: INTERVENTIONS:  - Administer IV fluids if ordered to ensure adequate hydration  - Maintain NPO status until nausea and vomiting are resolved  - Nasogastric tube if ordered  - Administer ordered antiemetic medications as needed  - Provide nonpharmacologic comfort measures as appropriate  - Advance diet as tolerated, if ordered  - Consider nutrition services referral to assist patient with adequate nutrition and appropriate food choices  Outcome: Progressing  Goal: Maintains or returns to baseline bowel function  Description: INTERVENTIONS:  - Assess bowel function  - Encourage oral fluids to ensure adequate hydration  - Administer IV fluids if ordered to ensure adequate hydration  - Administer ordered medications as needed  - Encourage mobilization and activity  - Consider nutritional services referral to assist patient with adequate nutrition and appropriate food choices  Outcome: Progressing  Goal: Maintains adequate nutritional intake  Description: INTERVENTIONS:  - Monitor percentage of each meal consumed  - Identify factors contributing to decreased intake, treat as appropriate  - Assist with meals as needed  - Monitor I&O, weight, and lab values if indicated  - Obtain nutrition services referral as needed  Outcome: Progressing     Problem: METABOLIC, FLUID AND ELECTROLYTES - ADULT  Goal: Electrolytes maintained within normal limits  Description: INTERVENTIONS:  - Monitor labs and assess patient for signs and symptoms of electrolyte imbalances  - Administer electrolyte replacement as ordered  - Monitor response to electrolyte replacements, including repeat lab results as appropriate  - Instruct patient on fluid and nutrition as appropriate  Outcome: Progressing  Goal: Fluid balance maintained  Description: INTERVENTIONS:  - Monitor labs   - Monitor I/O and WT  - Instruct patient on fluid and nutrition as appropriate  - Assess for signs & symptoms of volume excess or deficit  Outcome: Progressing     Problem: PAIN - ADULT  Goal: Verbalizes/displays adequate comfort level or baseline comfort level  Description: Interventions:  - Encourage patient to monitor pain and request assistance  - Assess pain using appropriate pain scale  - Administer analgesics based on type and severity of pain and evaluate response  - Implement non-pharmacological measures as appropriate and evaluate response  - Consider cultural and social influences on pain and pain management  - Notify physician/advanced practitioner if interventions unsuccessful or patient reports new pain  Outcome: Progressing     Problem: INFECTION - ADULT  Goal: Absence or prevention of progression during hospitalization  Description: INTERVENTIONS:  - Assess and monitor for signs and symptoms of infection  - Monitor lab/diagnostic results  - Monitor all insertion sites, i e  indwelling lines, tubes, and drains  - Monitor endotracheal if appropriate and nasal secretions for changes in amount and color  - [place] appropriate cooling/warming therapies per order  - Administer medications as ordered  - Instruct and encourage patient and family to use good hand hygiene technique  - Identify and instruct in appropriate isolation precautions for identified infection/condition  Outcome: Progressing  Goal: Absence of fever/infection during neutropenic period  Description: INTERVENTIONS:  - Monitor WBC    Outcome: Progressing     Problem: SAFETY ADULT  Goal: Maintain or return to baseline ADL function  Description: INTERVENTIONS:  -  Assess patient's ability to carry out ADLs; assess patient's baseline for ADL function and identify physical deficits which impact ability to perform ADLs (bathing, care of mouth/teeth, toileting, grooming, dressing, etc )  - Assess/evaluate cause of self-care deficits   - Assess range of motion  - Assess patient's mobility; develop plan if impaired  - Assess patient's need for assistive devices and provide as appropriate  - Encourage maximum independence but intervene and supervise when necessary  - Involve family in performance of ADLs  - Assess for home care needs following discharge   - Consider OT consult to assist with ADL evaluation and planning for discharge  - Provide patient education as appropriate  Outcome: Progressing  Goal: Maintains/Returns to pre admission functional level  Description: INTERVENTIONS:  - Perform BMAT or MOVE assessment daily    - Set and communicate daily mobility goal to care team and patient/family/caregiver  - Collaborate with rehabilitation services on mobility goals if consulted  - Perform Range of Motion 3-4 times a day  - Reposition patient every 2 hours    - Dangle patient 3 times a day  - Stand patient 3 times a day  - Ambulate patient 3 times a day  - Out of bed to chair 3 times a day   - Out of bed for meals 3 times a day  - Out of bed for toileting  - Record patient progress and toleration of activity level   Outcome: Progressing  Goal: Patient will remain free of falls  Description: INTERVENTIONS:  - Educate patient/family on patient safety including physical limitations  - Instruct patient to call for assistance with activity   - Consult OT/PT to assist with strengthening/mobility   - Keep Call bell within reach  - Keep bed low and locked with side rails adjusted as appropriate  - Keep care items and personal belongings within reach  - Initiate and maintain comfort rounds  - Make Fall Risk Sign visible to staff  - Offer Toileting every 2 Hours, in advance of need  - Initiate/Maintain bed/ chair alarm  - Apply yellow socks and bracelet for high fall risk patients  - Consider moving patient to room near nurses station  Outcome: Progressing     Problem: DISCHARGE PLANNING  Goal: Discharge to home or other facility with appropriate resources  Description: INTERVENTIONS:  - Identify barriers to discharge w/patient and caregiver  - Arrange for needed discharge resources and transportation as appropriate  - Identify discharge learning needs (meds, wound care, etc )  - Arrange for interpretive services to assist at discharge as needed  - Refer to Case Management Department for coordinating discharge planning if the patient needs post-hospital services based on physician/advanced practitioner order or complex needs related to functional status, cognitive ability, or social support system  Outcome: Progressing     Problem: Knowledge Deficit  Goal: Patient/family/caregiver demonstrates understanding of disease process, treatment plan, medications, and discharge instructions  Description: Complete learning assessment and assess knowledge base    Interventions:  - Provide teaching at level of understanding  - Provide teaching via preferred learning methods  Outcome: Progressing

## 2023-05-29 NOTE — H&P
[address]  H&P  Name: Kun Min 46 y o  female I MRN: [id number]  Unit/Bed#: RM10 I Date of Admission: 5/29/2023   Date of Service: 5/29/2023 I Hospital Day: 0      Assessment/Plan   * Decreased oral intake  Assessment & Plan  Decreased p o  intake for the past week due to persistent vomiting and diarrhea  · Symptomatic management vomiting and diarrhea as below to help moderate oral intake  · Start with surgical soft diet  · Encourage p o  intake is much as possible  · Appears to have a 6 pound weight loss over the past month when weight is trended    Vomiting and diarrhea  Assessment & Plan  Ongoing nonbloody nonbilious emesis and nonbloody diarrhea since EGD colonoscopy on 5/2/2023  · Reports this has been worsening over the past week  · Vomiting improved with IV Zofran-continue p o  as needed  · C  difficile ordered in ED per patient request though low to no suspicion as no prolonged hospitalization recently, no recent antibiotic use, no prior history of infection  · Received 1 5 L normal saline bolus in ED, continue normal saline 100 cc/h for maintenance  · Leukocytosis likely stress response vs viral gastroenteritis as symptoms worsening over the past week after resolution  · GI consulted as per patient GI symptoms have been ongoing since scoping on 5/2/2023    Hyponatremia  Assessment & Plan  Sodium 133 on arrival  · Likely hypovolemic hyponatremia in setting of vomiting and diarrhea  · Received normal saline 1 5 L bolus in ED  · Recheck BMP at 9 AM  · Continue IV normal saline 100 cc/h for rehydration    Crohn's disease of colon with complication (HCC)  Assessment & Plan  Stable, no bloody diarrhea  Continue home budesonide 3 mg 3 times daily [nonformulary, patient does have medication with her]  On Stelara and omalizumab infusion treatment with IV hydration-next scheduled on 5/30/2023  Patient missed all infusions this past week    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[facility] [address] )  Assessment & Plan  Continue Benadryl 50 mg twice daily, Protonix 40 mg twice daily, prednisone 20 mg as needed    Hypothyroidism (acquired)  Assessment & Plan  TSH WNL 1 889  Continue levothyroxine 100 mcg daily       VTE Pharmacologic Prophylaxis: VTE Score: 3 Moderate Risk (Score 3-4) - Pharmacological DVT Prophylaxis Ordered: enoxaparin (Lovenox)  Code Status: Level 1 - Full Code   Discussion with family: Patient declined call to   Anticipated Length of Stay: Patient will be admitted on an observation basis with an anticipated length of stay of less than 2 midnights secondary to Poor p o  tolerance in setting of vomiting and diarrhea  Total Time Spent on Date of Encounter in care of patient: 75 minutes This time was spent on one or more of the following: performing physical exam; counseling and coordination of care; obtaining or reviewing history; documenting in the medical record; reviewing/ordering tests, medications or procedures; communicating with other healthcare professionals and discussing with patient's family/caregivers  Chief Complaint: Vomiting and diarrhea    History of Present Illness:  Jose Andrew is a 46 y o  female with a PMH of Crohn's disease, mast cell activation  Patient reports onset of vomiting and diarrhea since having EGD colonoscopy on May 2, 2023  Does report history of Crohn's disease  Diarrhea is nonbloody  Reports p o  intake does make this worse so that this has been very limited to avoid symptoms  Has been unable to tolerate fluids as well  Due to the symptoms, patient did miss all her infusion appointments last week  Denies chest pain, dyspnea, abdominal pain, headache, fever, chills  Patient has not discussed this ongoing vomiting and diarrhea since scope with GI Dr Sal Baez    Review of Systems:  Review of Systems   Constitutional: Positive for appetite change  Negative for chills and fever  HENT: Negative for ear pain and sore throat      Eyes: Negative for pain and visual disturbance  Respiratory: Negative for cough, chest tightness and shortness of breath  Cardiovascular: Negative for chest pain and palpitations  Gastrointestinal: Positive for diarrhea and vomiting  Negative for abdominal pain, constipation and nausea  Genitourinary: Negative for dysuria, hematuria and menstrual problem  Musculoskeletal: Negative for arthralgias and back pain  Skin: Negative for color change and rash  Neurological: Negative for seizures and syncope  Psychiatric/Behavioral: Negative for dysphoric mood and suicidal ideas  All other systems reviewed and are negative  Past Medical and Surgical History:   Past Medical History:   Diagnosis Date   • Anemia 2007   • Anxiety    • Asthma    • Bile duct stricture 2014    Sphincter of oddi dysfunction   • Chronic kidney disease    • Colon polyp 1998   • Crohn's colitis ([facility] [address] )    • [facility] )    • Disease of thyroid gland    • 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   • Generalized anxiety disorder 08/26/2017   • GERD (gastroesophageal reflux disease) 1995   • GI (gastrointestinal bleed) 1994   • Heart murmur    • Inflammatory bowel disease    • Irritable bowel syndrome 2016   • Lactose intolerance 1982   • Mast cell disease    • Migraine    • Myocardial infarction [facility])     NSTEMI  pt denies, documented in cardio note   • Pancreatitis     sphinger of    • Psychiatric disorder    • RA (rheumatoid arthritis) ([facility] [address] )    • Seizures ([facility] [address] )    • Ulcerative colitis ([facility] [address] )    • Visual impairment        Past Surgical History:   Procedure Laterality Date   • ABDOMINAL SURGERY  2017   • APPENDECTOMY     • CHOLECYSTECTOMY     • COLONOSCOPY  2019   • EGD AND COLONOSCOPY N/A 09/12/2017    Procedure: EGD AND COLONOSCOPY;  Surgeon: Jose Meier MD;  Location: [facility]; Service: Gastroenterology   • ERCP N/A 09/15/2017    Procedure: ENDOSCOPIC RETROGRADE CHOLANGIOPANCREATOGRAPHY (ERCP);   Surgeon: "Tracey Dumas MD;  Location: [facility]; Service: Gastroenterology   • HYSTERECTOMY     • KNEE SURGERY Right    • LAPAROSCOPIC ENDOMETRIOSIS FULGURATION     • ORIF ANKLE FRACTURE Left    • WI ARTHRS KNE SURG W/MENISCECTOMY MED/LAT W/SHVG Left 05/12/2017    Procedure: POSSIBLE MEDIAL MENISECTOMY;  Surgeon: Pratima Beauchamp MD;  Location: [facility];  Service: Orthopedics   • WI ARTHRS KNEE DEBRIDEMENT/SHAVING ARTCLR CRTLG Left 05/12/2017    Procedure: KNEE ARTHROSCOPY EVALUATION, CHONDROPLASTY;  Surgeon: Pratima Beauchamp MD;  Location: [facility];  Service: Orthopedics   • WI LAPS ABD PRTM&OMENTUM DX W/WO SPEC BR/WA SPX N/A 12/12/2017    Procedure: LAPAROSCOPY DIAGNOSTIC  WITH LYSIS OF ADHESIONS;  Surgeon: Joanne Crespo DO;  Location:  MAIN OR;  Service: General   • UPPER GASTROINTESTINAL ENDOSCOPY  2019       Meds/Allergies:  Prior to Admission medications    Medication Sig Start Date End Date Taking?  Authorizing Provider   B-D 3CC LUER-SHAMEKA SYR 25GX1\" 25G X 1\" 3 ML MISC  6/1/22   Historical Provider, MD   budesonide (ENTOCORT EC) 3 MG capsule Take 1 capsule (3 mg total) by mouth 3 (three) times a day 3/10/23   Nida Smallwood PA-C   butalbital-acetaminophen-caffeine (FIORICET,ESGIC) -40 mg per tablet Take 1 tablet by mouth every 8 (eight) hours as needed for migraine 3/2/23   Nadja Grave, DO   cetirizine (ZyrTEC) 10 mg tablet Take 20 mg by mouth daily    Historical Provider, MD   Cholecalciferol 10 MCG /0 025ML LIQD Take 1,000 Units by mouth 2 (two) times a day 1/20/23   Nadja Grave, DO   cyanocobalamin (VITAMIN B-12) 100 mcg tablet Take 100 mcg by mouth 4 (four) times a day 4/19/23   Historical Provider, MD   cyanocobalamin 1,000 mcg/mL  1/5/21   Historical Provider, MD   diclofenac (VOLTAREN) 75 mg EC tablet Take 1 tablet (75 mg total) by mouth 2 (two) times a day for 21 days  Patient taking differently: Take 75 mg by mouth as needed 1/20/21 5/2/23  Damon Bazan MD   diphenhydrAMINE (BENADRYL) 25 mg " tablet Take 50 mg by mouth 2 (two) times a day    Historical Provider, MD   diphenhydrAMINE (BENADRYL) 50 mg/mL Inject 0 5 mL (25 mg total) into a catheter in a vein every 6 (six) hours as needed for itching or allergies 3/8/23   Marina Ramos PA-C   furosemide (LASIX) 20 mg tablet as needed 10/1/22   Historical Provider, MD   Lactobacillus (PROBIOTIC ACIDOPHILUS PO) Take by mouth    Historical Provider, MD   Lactobacillus-Inulin (Graben 13) CAPS Take 200 mg by mouth daily    Historical Provider, MD   levalbuterol Marvin Enriqueta HFA) 45 mcg/act inhaler Inhale 1-2 puffs every 4 (four) hours as needed for shortness of breath 5/23/23   Boyce Meigs, DO   levothyroxine 100 mcg tablet Take 100 mcg by mouth every other day    Historical Provider, MD   LORazepam (ATIVAN) 1 mg tablet Take 1 tablet (1 mg total) by mouth every 8 (eight) hours as needed for anxiety 2/4/21   Tracie Correia MD   NON FORMULARY immunoglobin sq 6g 30 ml once a week    Historical Provider, MD   nystatin (MYCOSTATIN) 500,000 units/5 mL suspension Swish and spit 5 mL (500,000 Units total) 4 (four) times a day 3/10/23   Marina Ramos PA-C   omalizumab Berneice Fede) 150 mg Inject 300 mg under the skin every 21 days    Historical Provider, MD   pantoprazole (PROTONIX) 40 mg tablet TAKE ONE TABLET BY MOUTH TWICE A DAY 12/30/22   Alfreda Betancourt PA-C   Potassium Chloride 10 % SOLN Take 99 mg by mouth 2 (two) times a day 1/25/23   Historical Provider, MD   predniSONE 20 mg tablet Take as instructed by physician   20mg as needed on days with symptoms 5/22/23   Eloy Garay MD   Riboflavin (Vitamin B-2) 100 MG TABS Take 4 tablets (400 mg total) by mouth daily 1/20/21 2/20/23  Beata Quinones MD   Riboflavin (Vitamin B-2) 100 MG TABS Take 100 mg by mouth 4 (four) times a day 1/25/23   Historical Provider, MD   Stelara 90 MG/ML subcutaneous injection 90mg inject subcutaneously every 21 days 4/27/22   Eloy Garay MD   TechLite Lancets [address] [facility]  4/15/22   Historical Provider, MD COULTER have reviewed home medications with patient personally  Allergies:    Allergies   Allergen Reactions   • Aimovig [Erenumab-Aooe] Anaphylaxis   • Amitriptyline Anaphylaxis     According to the patient she had nphylaxis   • Aspergillus Allergy Skin Test Other (See Comments), Hives and Swelling   • Azathioprine Other (See Comments) and Anaphylaxis     pancreatitis  According to patient she needed Epipen   • Banana - Food Allergy Itching, Swelling and Anaphylaxis   • Buspar [Buspirone] Hives and Shortness Of Breath   • Citric Acid-Polysorbate 80 Abdominal Pain, Anaphylaxis, Anxiety, Bradycardia, Diarrhea, Fatigue, GI Intolerance, Headache, Hives, Hyperactivity, Itching, Lip Swelling, Nausea Only, Palpitations, Rash, Shortness Of Breath, Tachycardia, Throat Swelling, Tongue Swelling and Vomiting   • Corn Dextrin Anaphylaxis     Any corn based products   • Eggs Or Egg-Derived Products - Food Allergy Anaphylaxis   • Epinephrine Anaphylaxis   • Erenumab Anaphylaxis     patient sent portal message stating she had anaphylaxis  patient sent portal message stating she had anaphylaxis     • Gadolinium Abdominal Pain, Anaphylaxis, Anxiety, Confusion, Delirium, Dizziness, Eye Swelling, Fatigue, GI Intolerance, Headache, Hives, Irritability, Itching, Lip Swelling, Nausea Only, Palpitations, Photosensitivity, Rash, Seizures, Shortness Of Breath, Swelling, Syncope, Throat Swelling, Tongue Swelling, Tremor, Visual Disturbance and Vomiting   • Gelatin - Food Allergy Anaphylaxis   • Heparin Hives     Painful welts    • Lactated Ringers Shortness Of Breath     Pt questions this allergy, pt has received LR multiple times without reaction   • Lavender Oil Anaphylaxis     Other reaction(s): anaphalxis   • Malto Dextrin [Dextrin] Anaphylaxis   • Other Anaphylaxis     Gel caps, maltodextrose, dextrose,grapes, lavender    • Penicillium Notatum Shortness Of Breath and Swelling   • Sumatriptan "Anaphylaxis     Bradycardia, sob, back pressure, head pain,throat tightness  According to the patient she had anphylaxis   • Ustekinumab Anaphylaxis   • Contrast [Iodinated Contrast Media] Other (See Comments)     Pt states needs to be premedicated prior to injection   • Corn Oil - Food Allergy - Food Allergy Hives   • Humira [Adalimumab] Other (See Comments)     \"I develop drug induced lupus\"   • Ibuprofen Hives and Throat Swelling   • Polyethylene Glycol Diarrhea and Vomiting   • Red Dye - Food Allergy Other (See Comments)     intolerance   • Remicade [Infliximab] Other (See Comments)     \"I develop drug induced lupus\"   • Soy Allergy - Food Allergy Other (See Comments)   • Sulfa Antibiotics Hives and Other (See Comments)   • Sulfate Hives   • Sulfites - Food Allergy Hives   • Chlorhexidine Itching   • Freederm Adhesive Remover [New Skin] Rash   • Histamine Hives, Rash and Other (See Comments)     Intolerance         Social History:  Marital Status: /Civil Union     Substance Use History:   Social History     Substance and Sexual Activity   Alcohol Use Never     Social History     Tobacco Use   Smoking Status Never   Smokeless Tobacco Never     Social History     Substance and Sexual Activity   Drug Use Never       Family History:  Family History   Problem Relation Age of Onset   • Ulcerative colitis Mother    • Arthritis Mother    • Colon polyps Mother    • Heart failure Father    • Neuropathy Father    • Macular degeneration Father    • Diabetes Father    • Early death Father    • Vision loss Father    • Asthma Daughter    • Hypothyroidism Daughter    • Heart disease Maternal Grandmother    • Arthritis Maternal Grandmother    • No Known Problems Maternal Grandfather    • Arthritis Paternal Grandmother    • Macular degeneration Paternal Grandmother    • Vision loss Paternal Grandmother    • Lymphoma Paternal Grandfather    • Cancer Paternal Grandfather    • Early death Paternal Grandfather    • Breast " "cancer Maternal Aunt    • Cancer Maternal Aunt    • Miscarriages / Stillbirths Maternal Aunt    • Heart failure Paternal Aunt    • Heart failure Paternal Aunt    • Irritable bowel syndrome Paternal Aunt    • Breast cancer Paternal Aunt    • Breast cancer additional onset Paternal [de-identified]    • Hypothyroidism Paternal Aunt    • Cancer Paternal Aunt    • No Known Problems Son    • No Known Problems Son    • Kidney disease Brother    • Depression Paternal Uncle    • Early death Paternal Uncle        Physical Exam:     Vitals:   Blood Pressure: 150/98 (05/29/23 0216)  Pulse: (!) 106 (05/29/23 0216)  Temperature: 98 4 °F (36 9 °C) (05/29/23 0216)  Temp Source: Temporal (05/29/23 0216)  Respirations: 18 (05/29/23 0216)  Height: 5' 0 5\" (153 7 cm) (05/29/23 0216)  Weight - Scale: 67 1 kg (148 lb) (05/29/23 0216)  SpO2: 96 % (05/29/23 0216)    Physical Exam  Constitutional:       Appearance: She is ill-appearing  HENT:      Head: Normocephalic and atraumatic  Cardiovascular:      Rate and Rhythm: Normal rate and regular rhythm  Pulses: Normal pulses  Heart sounds: No murmur heard  Pulmonary:      Effort: Pulmonary effort is normal  No respiratory distress  Breath sounds: Normal breath sounds  No wheezing  Abdominal:      General: Bowel sounds are normal  There is no distension  Palpations: Abdomen is soft  Tenderness: There is abdominal tenderness (Mild diffuse)  Musculoskeletal:      Right lower leg: No edema  Left lower leg: No edema  Skin:     General: Skin is warm and dry  Capillary Refill: Capillary refill takes less than 2 seconds  Neurological:      General: No focal deficit present  Mental Status: She is alert and oriented to person, place, and time     Psychiatric:         Mood and Affect: Mood normal          Behavior: Behavior normal          Additional Data:     Lab Results:  Results from last 7 days   Lab Units 05/29/23 0252   EOS PCT % 0   HEMATOCRIT % 44 8 " HEMOGLOBIN g/dL 15 1   LYMPHS PCT % 6*   MONOS PCT % 1*   NEUTROS PCT % 92*   PLATELETS Thousands/uL 424*   WBC Thousand/uL 13 25*     Results from last 7 days   Lab Units 05/29/23  0314   ANION GAP mmol/L 13   ALBUMIN g/dL 3 6   ALK PHOS U/L 61   ALT U/L 10   AST U/L 12*   BUN mg/dL 9   CALCIUM mg/dL 9 2   CHLORIDE mmol/L 100   CO2 mmol/L 20*   CREATININE mg/dL 0 85   GLUCOSE RANDOM mg/dL 207*   POTASSIUM mmol/L 3 6   SODIUM mmol/L 133*   TOTAL BILIRUBIN mg/dL 0 57                       Lines/Drains:  Invasive Devices     Peripheral Intravenous Line  Duration           Peripheral IV 05/29/23 Dorsal (posterior); Left Hand <1 day                    Imaging: No pertinent imaging reviewed  No orders to display       EKG and Other Studies Reviewed on Admission:   · EKG: No EKG obtained  ** Please Note: This note has been constructed using a voice recognition system   **

## 2023-05-29 NOTE — ED PROVIDER NOTES
"History  Chief Complaint   Patient presents with   • Dehydration     Patient reports being dehydrated since her colonoscopy  Patient states she also needs a new epi pen  Janet Babcock is a 46y o  year old female with PMH of crohn's colitis, thyroid disease, RA, Mast Cell Activation Syndrome presenting to the [facility] ED for vomiting and diarrhea  Patient states she has had numerous daily episodes of nonbloody diarrhea  She states this has been ongoing since she underwent EGD and colonoscopy on 5/2/2023  The patient also reports that some at the infusion center was recently diagnosed with Cdiff infection and she is concerned that she may have Cdiff  Patient reports generalized fatigue and feels dehydrated  Her weakness prevented her from attending her regularly scheduled IV fluid infusions this past week  Patient denies fevers, chest pain or dyspnea  She reports generalized abdominal discomfort and \"burning\" sensation in her back  She is on stelara and follows with GI Dr Opal Falcon  Surgical history on chart includes appendectomy, cholecystectomy, hysterectomy and lysis of adhesions  History provided by:  Patient and medical records   used: No        Prior to Admission Medications   Prescriptions Last Dose Informant Patient Reported? Taking?    B-D 3CC LUER-SHAMEKA SYR 25GX1\" 25G X 1\" 3 ML MISC  Self Yes No   Cholecalciferol 10 MCG /0 025ML LIQD  Self No No   Sig: Take 1,000 Units by mouth 2 (two) times a day   LORazepam (ATIVAN) 1 mg tablet  Self No No   Sig: Take 1 tablet (1 mg total) by mouth every 8 (eight) hours as needed for anxiety   Lactobacillus (PROBIOTIC ACIDOPHILUS PO)  Self Yes No   Sig: Take by mouth   Lactobacillus-Inulin ([facility]) CAPS  Self Yes No   Sig: Take 200 mg by mouth daily   NON FORMULARY  Self Yes No   Sig: immunoglobin sq 6g 30 ml once a week   Potassium Chloride 10 % SOLN  Self Yes No   Sig: Take 99 mg by mouth 2 (two) times a day   Riboflavin " (Vitamin B-2) 100 MG TABS   No No   Sig: Take 4 tablets (400 mg total) by mouth daily   Riboflavin (Vitamin B-2) 100 MG TABS  Self Yes No   Sig: Take 100 mg by mouth 4 (four) times a day   Stelara 90 MG/ML subcutaneous injection  Self No No   Simg inject subcutaneously every 21 days   TechLite Lancets MISC  Self Yes No   budesonide (ENTOCORT EC) 3 MG capsule  Self No No   Sig: Take 1 capsule (3 mg total) by mouth 3 (three) times a day   butalbital-acetaminophen-caffeine (FIORICET,ESGIC) -40 mg per tablet  Self No No   Sig: Take 1 tablet by mouth every 8 (eight) hours as needed for migraine   cetirizine (ZyrTEC) 10 mg tablet  Self Yes No   Sig: Take 20 mg by mouth daily   cyanocobalamin (VITAMIN B-12) 100 mcg tablet  Self Yes No   Sig: Take 100 mcg by mouth 4 (four) times a day   cyanocobalamin 1,000 mcg/mL  Self Yes No   diclofenac (VOLTAREN) 75 mg EC tablet   No No   Sig: Take 1 tablet (75 mg total) by mouth 2 (two) times a day for 21 days   Patient taking differently: Take 75 mg by mouth as needed   diphenhydrAMINE (BENADRYL) 25 mg tablet  Self Yes No   Sig: Take 50 mg by mouth 2 (two) times a day   diphenhydrAMINE (BENADRYL) 50 mg/mL  Self No No   Sig: Inject 0 5 mL (25 mg total) into a catheter in a vein every 6 (six) hours as needed for itching or allergies   furosemide (LASIX) 20 mg tablet  Self Yes No   Sig: as needed   levalbuterol (XOPENEX HFA) 45 mcg/act inhaler   No No   Sig: Inhale 1-2 puffs every 4 (four) hours as needed for shortness of breath   levothyroxine 100 mcg tablet  Self Yes No   Sig: Take 100 mcg by mouth every other day   nystatin (MYCOSTATIN) 500,000 units/5 mL suspension  Self No No   Sig: Swish and spit 5 mL (500,000 Units total) 4 (four) times a day   omalizumab (XOLAIR) 150 mg  Self Yes No   Sig: Inject 300 mg under the skin every 21 days   pantoprazole (PROTONIX) 40 mg tablet  Self No No   Sig: TAKE ONE TABLET BY MOUTH TWICE A DAY   predniSONE 20 mg tablet   No No   Sig: Take as instructed by physician  20mg as needed on days with symptoms      Facility-Administered Medications: None       Past Medical History:   Diagnosis Date   • Anemia 2007   • Anxiety    • Asthma    • Bile duct stricture 2014    Sphincter of oddi dysfunction   • Chronic kidney disease    • Colon polyp 1998   • Crohn's colitis ([facility] )    • Deep vein thrombosis ([facility] )    • Disease of thyroid gland    • 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   • Generalized anxiety disorder 08/26/2017   • GERD (gastroesophageal reflux disease) 1995   • GI (gastrointestinal bleed) 1994   • Heart murmur    • Inflammatory bowel disease    • Irritable bowel syndrome 2016   • Lactose intolerance 1982   • Mast cell disease    • Migraine    • Myocardial infarction [facility])     NSTEMI  pt denies, documented in cardio note   • Pancreatitis     sphinger of    • Psychiatric disorder    • RA (rheumatoid arthritis) ([facility] )    • Seizures ([facility] )    • Ulcerative colitis ([place] [address] )    • Visual impairment        Past Surgical History:   Procedure Laterality Date   • ABDOMINAL SURGERY  2017   • APPENDECTOMY     • CHOLECYSTECTOMY     • COLONOSCOPY  2019   • EGD AND COLONOSCOPY N/A 09/12/2017    Procedure: EGD AND COLONOSCOPY;  Surgeon: Mayur Avila MD;  Location: [facility]; Service: Gastroenterology   • ERCP N/A 09/15/2017    Procedure: ENDOSCOPIC RETROGRADE CHOLANGIOPANCREATOGRAPHY (ERCP); Surgeon: David Zavaleta MD;  Location: [facility];   Service: Gastroenterology   • HYSTERECTOMY     • KNEE SURGERY Right    • LAPAROSCOPIC ENDOMETRIOSIS FULGURATION     • ORIF ANKLE FRACTURE Left    • NV ARTHRS KNE SURG W/MENISCECTOMY MED/LAT W/SHVG Left 05/12/2017    Procedure: POSSIBLE MEDIAL MENISECTOMY;  Surgeon: Harish De Santiago MD;  Location: [facility];  Service: Orthopedics   • NV ARTHRS KNEE DEBRIDEMENT/SHAVING ARTCLR CRTLG Left 05/12/2017    Procedure: KNEE ARTHROSCOPY EVALUATION, CHONDROPLASTY;  Surgeon: Harish De Santiago MD;  Location: [facility];  Service: Orthopedics   • ND LAPS ABD PRTM&OMENTUM DX W/WO SPEC BR/WA SPX N/A 12/12/2017    Procedure: LAPAROSCOPY DIAGNOSTIC  WITH LYSIS OF ADHESIONS;  Surgeon: Natasha Kiran DO;  Location: [facility];  Service: General   • UPPER GASTROINTESTINAL ENDOSCOPY  2019       Family History   Problem Relation Age of Onset   • Ulcerative colitis Mother    • Arthritis Mother    • Colon polyps Mother    • Heart failure Father    • Neuropathy Father    • Macular degeneration Father    • Diabetes Father    • Early death Father    • Vision loss Father    • Asthma Daughter    • Hypothyroidism Daughter    • Heart disease Maternal Grandmother    • Arthritis Maternal Grandmother    • No Known Problems Maternal Grandfather    • Arthritis Paternal Grandmother    • Macular degeneration Paternal Grandmother    • Vision loss Paternal Grandmother    • Lymphoma Paternal Grandfather    • Cancer Paternal Grandfather    • Early death Paternal Grandfather    • Breast cancer Maternal Aunt    • Cancer Maternal Aunt    • Miscarriages / Stillbirths Maternal Aunt    • Heart failure Paternal Aunt    • Heart failure Paternal Aunt    • Irritable bowel syndrome Paternal [de-identified]    • Breast cancer Paternal Aunt    • Breast cancer additional onset Paternal [de-identified]    • Hypothyroidism Paternal Aunt    • Cancer Paternal Aunt    • No Known Problems Son    • No Known Problems Son    • Kidney disease Brother    • Depression Paternal Uncle    • Early death Paternal Uncle      I have reviewed and agree with the history as documented  E-Cigarette/Vaping   • E-Cigarette Use Never User      E-Cigarette/Vaping Substances   • Nicotine No    • THC No    • CBD No    • Flavoring No    • Other No    • Unknown No      Social History     Tobacco Use   • Smoking status: Never   • Smokeless tobacco: Never   Vaping Use   • Vaping Use: Never used   Substance Use Topics   • Alcohol use: Never   • Drug use: Never       Review of Systems   Constitutional: Positive for fatigue   Negative for chills and fever  HENT: Negative for congestion  Respiratory: Negative for shortness of breath  Cardiovascular: Negative for chest pain  Gastrointestinal: Positive for abdominal pain, diarrhea, nausea and vomiting  Negative for blood in stool  Genitourinary: Negative for dysuria and hematuria  Musculoskeletal: Positive for back pain  Skin: Negative for rash  Neurological: Negative for weakness and numbness  All other systems reviewed and are negative  Physical Exam  Physical Exam  Vitals and nursing note reviewed  Constitutional:       General: She is not in acute distress  Appearance: Normal appearance  She is well-developed  She is not ill-appearing, toxic-appearing or diaphoretic  HENT:      Head: Normocephalic and atraumatic  Nose: No congestion or rhinorrhea  Mouth/Throat:      Mouth: Mucous membranes are dry  Eyes:      General:         Right eye: No discharge  Left eye: No discharge  Cardiovascular:      Rate and Rhythm: Regular rhythm  Tachycardia present  Pulmonary:      Effort: Pulmonary effort is normal  No accessory muscle usage or respiratory distress  Breath sounds: Normal breath sounds  No stridor  No decreased breath sounds, wheezing, rhonchi or rales  Abdominal:      General: Bowel sounds are normal  There is no distension  Palpations: Abdomen is soft  Tenderness: There is generalized abdominal tenderness  There is no right CVA tenderness, left CVA tenderness, guarding or rebound  Musculoskeletal:      Cervical back: Normal range of motion and neck supple  No rigidity  Right lower leg: No tenderness  No edema  Left lower leg: No tenderness  No edema  Skin:     Capillary Refill: Capillary refill takes less than 2 seconds  Findings: No rash  Neurological:      Mental Status: She is alert and oriented to person, place, and time     Psychiatric:         Mood and Affect: Mood normal          Behavior: Behavior normal          Vital Signs  ED Triage Vitals [05/29/23 0216]   Temperature Pulse Respirations Blood Pressure SpO2   98 4 °F (36 9 °C) (!) 106 18 150/98 96 %      Temp Source Heart Rate Source Patient Position - Orthostatic VS BP Location FiO2 (%)   Temporal Monitor Lying Right arm --      Pain Score       No Pain           Vitals:    05/29/23 0216   BP: 150/98   Pulse: (!) 106   Patient Position - Orthostatic VS: Lying         Visual Acuity      ED Medications  Medications   sodium chloride 0 9 % bolus 1,500 mL (1,500 mL Intravenous New Bag 5/29/23 0258)   ondansetron (ZOFRAN) injection 4 mg (4 mg Intravenous Given 5/29/23 0256)   methylPREDNISolone sodium succinate (Solu-MEDROL) injection 60 mg (60 mg Intravenous Given 5/29/23 0453)       Diagnostic Studies  Results Reviewed     Procedure Component Value Units Date/Time    Clostridium difficile toxin by PCR with EIA [[id number]]     Lab Status: No result Specimen: Stool from Per Rectum     TSH, 3rd generation with Free T4 reflex [[id number]]  (Normal) Collected: 05/29/23 0314    Lab Status: Final result Specimen: Blood from Hand, Left Updated: 05/29/23 0353     TSH 3RD GENERATON 1 889 uIU/mL     Lipase [[id number]]  (Abnormal) Collected: 05/29/23 0314    Lab Status: Final result Specimen: Blood from Hand, Left Updated: 05/29/23 0351     Lipase <6 u/L     Comprehensive metabolic panel [[id number]]  (Abnormal) Collected: 05/29/23 0314    Lab Status: Final result Specimen: Blood from Hand, Left Updated: 05/29/23 0351     Sodium 133 mmol/L      Potassium 3 6 mmol/L      Chloride 100 mmol/L      CO2 20 mmol/L      ANION GAP 13 mmol/L      BUN 9 mg/dL      Creatinine 0 85 mg/dL      Glucose 207 mg/dL      Calcium 9 2 mg/dL      AST 12 U/L      ALT 10 U/L      Alkaline Phosphatase 61 U/L      Total Protein 6 5 g/dL      Albumin 3 6 g/dL      Total Bilirubin 0 57 mg/dL      eGFR 80 ml/min/1 73sq m     Narrative:      [place] guidelines for Chronic Kidney Disease (CKD):   •  Stage 1 with normal or high GFR (GFR > 90 mL/min/1 73 square meters)  •  Stage 2 Mild CKD (GFR = 60-89 mL/min/1 73 square meters)  •  Stage 3A Moderate CKD (GFR = 45-59 mL/min/1 73 square meters)  •  Stage 3B Moderate CKD (GFR = 30-44 mL/min/1 73 square meters)  •  Stage 4 Severe CKD (GFR = 15-29 mL/min/1 73 square meters)  •  Stage 5 End Stage CKD (GFR <15 mL/min/1 73 square meters)  Note: GFR calculation is accurate only with a steady state creatinine    CBC and differential [[id number]]  (Abnormal) Collected: 05/29/23 0252    Lab Status: Final result Specimen: Blood from Heel, Left Updated: 05/29/23 0324     WBC 13 25 Thousand/uL      RBC 4 91 Million/uL      Hemoglobin 15 1 g/dL      Hematocrit 44 8 %      MCV 91 fL      MCH 30 8 pg      MCHC 33 7 g/dL      RDW 13 7 %      MPV 8 6 fL      Platelets 262 Thousands/uL      nRBC 0 /100 WBCs      Neutrophils Relative 92 %      Immat GRANS % 1 %      Lymphocytes Relative 6 %      Monocytes Relative 1 %      Eosinophils Relative 0 %      Basophils Relative 0 %      Neutrophils Absolute 12 33 Thousands/µL      Immature Grans Absolute 0 07 Thousand/uL      Lymphocytes Absolute 0 73 Thousands/µL      Monocytes Absolute 0 07 Thousand/µL      Eosinophils Absolute 0 01 Thousand/µL      Basophils Absolute 0 04 Thousands/µL     Narrative: This is an appended report  These results have been appended to a previously verified report  No orders to display              Procedures  Procedures         ED Course  ED Course as of 05/29/23 0602   Mon May 29, 2023   0507 Patient reassessed  She was ambulatory to bathroom without assistance  She has had no vomiting or diarrhea during ED course  VSS  Reviewed lab results with patient  She is requesting admission to hospital for IV fluids  SBIRT 22yo+    Flowsheet Row Most Recent Value   Initial Alcohol Screen: US AUDIT-C     1   How often do you have a drink "containing alcohol? 0 Filed at: 05/29/2023 0216   Audit-C Score 0 Filed at: 05/29/2023 9928   LAUREL: How many times in the past year have you    Used an illegal drug or used a prescription medication for non-medical reasons? Never Filed at: 05/29/2023 0216                    Medical Decision Making    46 y o  female presenting for vomiting and diarrhea  The patient ambulated to the ED room without assistance  The patient was concerned that she was \"going to have a seizure and develop paralysis\" and told the nurses that she had a seizure in the room with them though nursing did not notice tonic-clonic activity or prolonged loss of consciousness  The symptoms seemed to be more psychogenic in nature than true seizure episode  Patient nontoxic appearing on exam  No r/g on abdominal exam  No fevers to suggest abdominal abscess  Given reported history of ongoing vomiting/diarrhea will check labs to evaluate for anemia, AMAIRANI, electrolyte abnormalities, biliary disease, thyroid disease or other etiology of symptoms  The patient requested IV ativan, benadryl and solumedrol due to reported history of Mast Cell Activation Syndrome  The patient has no s/s of anaphylaxis on exam and does not exhibit seizure activity, will defer benadryl or ativan  As the patient has history of Crohns and missed her last infusion of solumedrol this past week will give dose of solumedrol  During ED course patient exhibited normal vitals  She ambulated to the bathroom  She had no vomiting or diarrhea during ED course  I reviewed the patient's labs in detail  She does not demonstrate an AMAIRANI or electrolyte abnormality which necessitates admission to the hospital  She is scheduled for an IV hydration infusion tomorrow though she feels too weak and dehydrated to go home  Will d/w SLIM for admission  Impression: nausea/vomiting/diarrhea in the setting of Crohn's Colitis    Patient care discussed with SLIM who will assume care and admit " patient to the hospital  I have answered all of the patient's questions and concerns  The patient is in agreement with the plan to proceed with admission to the hospital     Amount and/or Complexity of Data Reviewed  Labs: ordered  Risk  Prescription drug management  Decision regarding hospitalization  Disposition  Final diagnoses:   Nausea vomiting and diarrhea   Crohn's colitis ([facility] [address] )     Time reflects when diagnosis was documented in both MDM as applicable and the Disposition within this note     Time User Action Codes Description Comment    5/29/2023  6:01 AM Vera Negron Add [R11 2,  R19 7] Nausea vomiting and diarrhea     5/29/2023  6:01 AM Vera Negron Add [K50 10] Crohn's colitis [facility])       ED Disposition     ED Disposition   Admit    Condition   Stable    Date/Time   Mon May 29, 2023  6:01 AM    Comment   Case was discussed with JAY and the patient's admission status was agreed to be Admission Status: observation status to the service of Dr Mandy Camacho  Follow-up Information    None         Patient's Medications   Discharge Prescriptions    No medications on file       No discharge procedures on file      PDMP Review       Value Time User    PDMP Reviewed  Yes 3/2/2023  3:16 PM Danial Jones DO          ED Provider  Electronically Signed by           Gina Vieira DO  05/29/23 4910

## 2023-05-29 NOTE — ED NOTES
Notified Dr Jyoti Woods via [employer] about patients concerns with swallowing pills        Joanie Jaimes RN  05/29/23 0287

## 2023-05-29 NOTE — ED NOTES
"Pt again requesting that all medications be change to IV as she is refusing to take PO medications at this time \"due to the corn additive that she reports she is allergic to when she gets sick like this  \" Provider made aware        Jerome Luevano RN  05/29/23 1207    "

## 2023-05-30 ENCOUNTER — HOSPITAL ENCOUNTER (OUTPATIENT)
Dept: INFUSION CENTER | Facility: HOSPITAL | Age: 48
Discharge: HOME | End: 2023-05-30
Attending: INTERNAL MEDICINE

## 2023-05-30 LAB
ALBUMIN SERPL BCP-MCNC: 3.4 G/DL (ref 3.5–5)
ALP SERPL-CCNC: 49 U/L (ref 34–104)
ALT SERPL W P-5'-P-CCNC: 9 U/L (ref 7–52)
ANION GAP SERPL CALCULATED.3IONS-SCNC: 7 MMOL/L (ref 4–13)
AST SERPL W P-5'-P-CCNC: 8 U/L (ref 13–39)
BASOPHILS # BLD AUTO: 0.07 THOUSANDS/ÂΜL (ref 0–0.1)
BASOPHILS NFR BLD AUTO: 1 % (ref 0–1)
BILIRUB SERPL-MCNC: 0.45 MG/DL (ref 0.2–1)
BUN SERPL-MCNC: 11 MG/DL (ref 5–25)
C DIFF TOX GENS STL QL NAA+PROBE: NEGATIVE
CALCIUM ALBUM COR SERPL-MCNC: 8.7 MG/DL (ref 8.3–10.1)
CALCIUM SERPL-MCNC: 8.2 MG/DL (ref 8.4–10.2)
CAMPYLOBACTER DNA SPEC NAA+PROBE: NORMAL
CHLORIDE SERPL-SCNC: 108 MMOL/L (ref 96–108)
CO2 SERPL-SCNC: 25 MMOL/L (ref 21–32)
CREAT SERPL-MCNC: 0.9 MG/DL (ref 0.6–1.3)
EOSINOPHIL # BLD AUTO: 0.09 THOUSAND/ÂΜL (ref 0–0.61)
EOSINOPHIL NFR BLD AUTO: 1 % (ref 0–6)
ERYTHROCYTE [DISTWIDTH] IN BLOOD BY AUTOMATED COUNT: 14.1 % (ref 11.6–15.1)
GFR SERPL CREATININE-BSD FRML MDRD: 74 ML/MIN/1.73SQ M
GLUCOSE SERPL-MCNC: 88 MG/DL (ref 65–140)
HCT VFR BLD AUTO: 39.9 % (ref 34.8–46.1)
HGB BLD-MCNC: 12.7 G/DL (ref 11.5–15.4)
IMM GRANULOCYTES # BLD AUTO: 0.04 THOUSAND/UL (ref 0–0.2)
IMM GRANULOCYTES NFR BLD AUTO: 0 % (ref 0–2)
LYMPHOCYTES # BLD AUTO: 2.22 THOUSANDS/ÂΜL (ref 0.6–4.47)
LYMPHOCYTES NFR BLD AUTO: 16 % (ref 14–44)
MAGNESIUM SERPL-MCNC: 1.8 MG/DL (ref 1.9–2.7)
MCH RBC QN AUTO: 29.5 PG (ref 26.8–34.3)
MCHC RBC AUTO-ENTMCNC: 31.8 G/DL (ref 31.4–37.4)
MCV RBC AUTO: 93 FL (ref 82–98)
MONOCYTES # BLD AUTO: 1.64 THOUSAND/ÂΜL (ref 0.17–1.22)
MONOCYTES NFR BLD AUTO: 12 % (ref 4–12)
NEUTROPHILS # BLD AUTO: 9.78 THOUSANDS/ÂΜL (ref 1.85–7.62)
NEUTS SEG NFR BLD AUTO: 70 % (ref 43–75)
NRBC BLD AUTO-RTO: 0 /100 WBCS
PHOSPHATE SERPL-MCNC: 1.3 MG/DL (ref 2.7–4.5)
PLATELET # BLD AUTO: 400 THOUSANDS/UL (ref 149–390)
PMV BLD AUTO: 9 FL (ref 8.9–12.7)
POTASSIUM SERPL-SCNC: 3.6 MMOL/L (ref 3.5–5.3)
PROCALCITONIN SERPL-MCNC: <0.05 NG/ML
PROT SERPL-MCNC: 5.9 G/DL (ref 6.4–8.4)
RBC # BLD AUTO: 4.3 MILLION/UL (ref 3.81–5.12)
SALMONELLA DNA SPEC QL NAA+PROBE: NORMAL
SHIGA TOXIN STX GENE SPEC NAA+PROBE: NORMAL
SHIGELLA DNA SPEC QL NAA+PROBE: NORMAL
SODIUM SERPL-SCNC: 140 MMOL/L (ref 135–147)
WBC # BLD AUTO: 13.84 THOUSAND/UL (ref 4.31–10.16)

## 2023-05-30 PROCEDURE — 99222 1ST HOSP IP/OBS MODERATE 55: CPT | Performed by: INTERNAL MEDICINE

## 2023-05-30 PROCEDURE — 99232 SBSQ HOSP IP/OBS MODERATE 35: CPT | Performed by: INTERNAL MEDICINE

## 2023-05-30 PROCEDURE — 83735 ASSAY OF MAGNESIUM: CPT | Performed by: INTERNAL MEDICINE

## 2023-05-30 PROCEDURE — 85025 COMPLETE CBC W/AUTO DIFF WBC: CPT | Performed by: INTERNAL MEDICINE

## 2023-05-30 PROCEDURE — 84100 ASSAY OF PHOSPHORUS: CPT | Performed by: INTERNAL MEDICINE

## 2023-05-30 PROCEDURE — 84145 PROCALCITONIN (PCT): CPT | Performed by: INTERNAL MEDICINE

## 2023-05-30 PROCEDURE — 80053 COMPREHEN METABOLIC PANEL: CPT | Performed by: INTERNAL MEDICINE

## 2023-05-30 RX ORDER — METHYLPREDNISOLONE SODIUM SUCCINATE 40 MG/ML
20 INJECTION, POWDER, LYOPHILIZED, FOR SOLUTION INTRAMUSCULAR; INTRAVENOUS EVERY 8 HOURS SCHEDULED
Status: COMPLETED | OUTPATIENT
Start: 2023-05-30 | End: 2023-06-02

## 2023-05-30 RX ORDER — MAGNESIUM SULFATE HEPTAHYDRATE 40 MG/ML
2 INJECTION, SOLUTION INTRAVENOUS ONCE
Status: COMPLETED | OUTPATIENT
Start: 2023-05-30 | End: 2023-05-30

## 2023-05-30 RX ADMIN — METHYLPREDNISOLONE SODIUM SUCCINATE 40 MG: 40 INJECTION, POWDER, FOR SOLUTION INTRAMUSCULAR; INTRAVENOUS at 09:31

## 2023-05-30 RX ADMIN — SODIUM CHLORIDE 100 ML/HR: 0.9 INJECTION, SOLUTION INTRAVENOUS at 06:07

## 2023-05-30 RX ADMIN — DIPHENHYDRAMINE HYDROCHLORIDE 50 MG: 50 INJECTION, SOLUTION INTRAMUSCULAR; INTRAVENOUS at 23:52

## 2023-05-30 RX ADMIN — SODIUM CHLORIDE 100 ML/HR: 0.9 INJECTION, SOLUTION INTRAVENOUS at 18:26

## 2023-05-30 RX ADMIN — LORAZEPAM 1 MG: 2 INJECTION INTRAMUSCULAR; INTRAVENOUS at 23:51

## 2023-05-30 RX ADMIN — LORAZEPAM 1 MG: 2 INJECTION INTRAMUSCULAR; INTRAVENOUS at 05:26

## 2023-05-30 RX ADMIN — FAMOTIDINE 20 MG: 10 INJECTION, SOLUTION INTRAVENOUS at 20:59

## 2023-05-30 RX ADMIN — ONDANSETRON 4 MG: 2 INJECTION INTRAMUSCULAR; INTRAVENOUS at 04:26

## 2023-05-30 RX ADMIN — HYDROMORPHONE HYDROCHLORIDE 0.5 MG: 1 INJECTION, SOLUTION INTRAMUSCULAR; INTRAVENOUS; SUBCUTANEOUS at 17:43

## 2023-05-30 RX ADMIN — DIPHENHYDRAMINE HYDROCHLORIDE 50 MG: 50 INJECTION, SOLUTION INTRAMUSCULAR; INTRAVENOUS at 17:42

## 2023-05-30 RX ADMIN — DIPHENHYDRAMINE HYDROCHLORIDE 50 MG: 50 INJECTION, SOLUTION INTRAMUSCULAR; INTRAVENOUS at 11:21

## 2023-05-30 RX ADMIN — MAGNESIUM SULFATE HEPTAHYDRATE 2 G: 40 INJECTION, SOLUTION INTRAVENOUS at 09:32

## 2023-05-30 RX ADMIN — HYDROMORPHONE HYDROCHLORIDE 0.5 MG: 1 INJECTION, SOLUTION INTRAMUSCULAR; INTRAVENOUS; SUBCUTANEOUS at 00:12

## 2023-05-30 RX ADMIN — DIPHENHYDRAMINE HYDROCHLORIDE 50 MG: 50 INJECTION, SOLUTION INTRAMUSCULAR; INTRAVENOUS at 05:26

## 2023-05-30 RX ADMIN — FAMOTIDINE 20 MG: 10 INJECTION, SOLUTION INTRAVENOUS at 09:30

## 2023-05-30 RX ADMIN — METHYLPREDNISOLONE SODIUM SUCCINATE 20 MG: 40 INJECTION, POWDER, FOR SOLUTION INTRAMUSCULAR; INTRAVENOUS at 21:01

## 2023-05-30 RX ADMIN — HYDROMORPHONE HYDROCHLORIDE 0.5 MG: 1 INJECTION, SOLUTION INTRAMUSCULAR; INTRAVENOUS; SUBCUTANEOUS at 11:21

## 2023-05-30 RX ADMIN — ONDANSETRON 4 MG: 2 INJECTION INTRAMUSCULAR; INTRAVENOUS at 18:26

## 2023-05-30 RX ADMIN — ONDANSETRON 4 MG: 2 INJECTION INTRAMUSCULAR; INTRAVENOUS at 11:21

## 2023-05-30 RX ADMIN — HYDROMORPHONE HYDROCHLORIDE 0.5 MG: 1 INJECTION, SOLUTION INTRAMUSCULAR; INTRAVENOUS; SUBCUTANEOUS at 05:45

## 2023-05-30 RX ADMIN — LORAZEPAM 1 MG: 2 INJECTION INTRAMUSCULAR; INTRAVENOUS at 18:27

## 2023-05-30 RX ADMIN — LORAZEPAM 1 MG: 2 INJECTION INTRAMUSCULAR; INTRAVENOUS at 11:44

## 2023-05-30 NOTE — NURSING NOTE
At this time, the patient is refusing any/all oral/pill form medications including electrolyte replacements  She is also refusing the lovenox injections  Provider AMIRA Herrera MD aware

## 2023-05-30 NOTE — CASE MANAGEMENT
Case Management Progress Note    Patient name Jane Harvey  Location [facility] [id number] MRN [id number]  : 1975 Date 2023       LOS (days): 1  Geometric Mean LOS (GMLOS) (days):   Days to [facility]:        OBJECTIVE:        Current admission status: Inpatient  Preferred Pharmacy:   [address] [facility] [address]  [address] [facility]  [address]  [facility] [address]  Phone: [phone] Fax: [phone]    [address] [place] [address], [facility] [address]  Phone: [phone] Fax: [phone]    [facility] [address] [facility], [address]  [address]  Phone: [phone] Fax: [phone]    Primary Care Provider: Cynthia Carson DO    Primary Insurance: [facility]  Secondary Insurance: MEDICARE    PROGRESS NOTE:CM attempted to talk with pt and she was currently going into bathroom and states she will be a while  CM to follow up this afternoon

## 2023-05-30 NOTE — PLAN OF CARE
Problem: MOBILITY - ADULT  Goal: Maintain or return to baseline ADL function  Description: INTERVENTIONS:  -  Assess patient's ability to carry out ADLs; assess patient's baseline for ADL function and identify physical deficits which impact ability to perform ADLs (bathing, care of mouth/teeth, toileting, grooming, dressing, etc )  - Assess/evaluate cause of self-care deficits   - Assess range of motion  - Assess patient's mobility; develop plan if impaired  - Assess patient's need for assistive devices and provide as appropriate  - Encourage maximum independence but intervene and supervise when necessary  - Involve family in performance of ADLs  - Assess for home care needs following discharge   - Consider OT consult to assist with ADL evaluation and planning for discharge  - Provide patient education as appropriate  Outcome: Progressing     Problem: SAFETY ADULT  Goal: Maintain or return to baseline ADL function  Description: INTERVENTIONS:  -  Assess patient's ability to carry out ADLs; assess patient's baseline for ADL function and identify physical deficits which impact ability to perform ADLs (bathing, care of mouth/teeth, toileting, grooming, dressing, etc )  - Assess/evaluate cause of self-care deficits   - Assess range of motion  - Assess patient's mobility; develop plan if impaired  - Assess patient's need for assistive devices and provide as appropriate  - Encourage maximum independence but intervene and supervise when necessary  - Involve family in performance of ADLs  - Assess for home care needs following discharge   - Consider OT consult to assist with ADL evaluation and planning for discharge  - Provide patient education as appropriate  Outcome: Progressing  Goal: Patient will remain free of falls  Description: INTERVENTIONS:  - Educate patient/family on patient safety including physical limitations  - Instruct patient to call for assistance with activity   - Consult OT/PT to assist with strengthening/mobility   - Keep Call bell within reach  - Keep bed low and locked with side rails adjusted as appropriate  - Keep care items and personal belongings within reach  - Initiate and maintain comfort rounds  - Make Fall Risk Sign visible to staff  - Offer Toileting every 2 Hours, in advance of need  - Initiate/Maintain bed/ chair alarm  - Apply yellow socks and bracelet for high fall risk patients  - Consider moving patient to room near nurses station  Outcome: Progressing     Problem: Knowledge Deficit  Goal: Patient/family/caregiver demonstrates understanding of disease process, treatment plan, medications, and discharge instructions  Description: Complete learning assessment and assess knowledge base    Interventions:  - Provide teaching at level of understanding  - Provide teaching via preferred learning methods  Outcome: Progressing

## 2023-05-30 NOTE — CASE MANAGEMENT
Case Management Assessment    Patient name Yoselin Records  Location [facility] [id number] MRN [id number]  : 1975 Date 2023       Current Admission Date: 2023  Current Admission Diagnosis:Decreased oral intake   Patient Active Problem List    Diagnosis Date Noted   • Decreased oral intake 2023   • Immunosuppression due to chronic steroid use ([facility] ) 2022   • Atrial tachycardia ([facility] ) 2022   • Nephrolithiasis 2021   • Anaphylactic reaction 2021   • Intractable nausea and vomiting 2021   • Heart palpitations 2021   • Hyponatremia 2021   • Inflammatory bowel disease 2021   • Current chronic use of systemic steroids 2021   • MS [facility]) [facility] ) 2021   • Migraine    • Overweight (BMI 25 0-29 9) 2021   • Anorexia 10/17/2020   • Crohn's disease of colon with complication ([facility] )    • Mild protein-calorie malnutrition ([facility] ) 2019   • Constipation 09/10/2019   • Mass of left axilla 2019   • Immunosuppressed status ([facility] ) 2019   • Thrombocytosis 2019   • Proctitis 2019   • Thrush 2019   • Chronic back pain 2019   • Primary localized osteoarthrosis of ankle and foot 2018   • Fibromyalgia 2017   • CHF (congestive heart failure) ([facility] ) 2017   • Meniere's disease 2017   • Leukocytosis 09/10/2017   • Hypomagnesemia 2017   • Generalized anxiety disorder 2017   • Vomiting and diarrhea 2017   • Vitamin D deficiency 2017   • Hypokalemia 2017   • Throat tightness 2017   • Abdominal pain 2017   • Headache 2017   • Disorder of synovium 2017   • Chondromalacia 2017   • Chronic idiopathic urticaria 2017   • 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[facility] ) 2016   • Hypothyroidism (acquired) 2014      LOS (days): 1  Geometric Mean LOS (GMLOS) (days):   Days to GMLOS:     OBJECTIVE:    Risk of Unplanned Readmission Score: 12 46         Current admission status: Inpatient       Preferred Pharmacy:   [facility], [address]  [address]  [address]  [address]  [place] [address]  Phone: [phone] Fax: [phone] [address] , [facility] [address]  Phone: [phone] Fax: [phone]    [facility] [address], [address]  [address]  Phone: [phone] Fax: [phone]    Primary Care Provider: Crista Denny DO    Primary Insurance: [employer] [place]  Secondary Insurance: MEDICARE    ASSESSMENT:  [address] Representative - Spouse   Primary Phone: [phone] (Mobile)  Home Phone: [phone]               Advance Directives  Does patient have a [address]?: No  Was patient offered paperwork?: Yes (declined)  Does patient currently have a Health Care decision maker?: Yes, please see Health Care Proxy section  Does patient have Advance Directives?: No  Was patient offered paperwork?: Yes (declined)  Primary Contact: Giulia Yang         Readmission Root Cause  30 Day Readmission: No    Patient Information  Admitted from[de-identified] Home  Mental Status: Alert  During Assessment patient was accompanied by: Not accompanied during assessment  Assessment information provided by[de-identified] Patient  Primary Caregiver: Self  Support Systems: Self, Spouse/significant other, Son  [place] of Residence: [place]  What city do you live in?: [place]  Home entry access options   Select all that apply : Stairs  Number of steps to enter home :  (12 front, 3 back entrance)  Do the steps have railings?: Yes  [facility] [place] )  Upon entering residence, is there a bedroom on the main floor (no further steps)?: Yes  Upon entering residence, is there a bathroom on the main floor (no further steps)?: Yes  In the last 12 months, was there a time when you were not able to pay the mortgage or rent on time?: No  In the last 12 months, how many places have you lived?: 1  In the last 12 months, was there a time when you did not have a steady place to sleep or slept in a shelter (including now)?: No  Homeless/housing insecurity resource given?: N/A  Living Arrangements: Lives w/ Spouse/significant other, Lives w/ Son  Is patient a ?: No    Activities of Daily Living Prior to Admission  Functional Status: Independent  Completes ADLs independently?: Yes  Ambulates independently?: Yes  Does patient use assisted devices?: Yes  Assisted Devices (DME) used: [address] [place] [address] Name (respiratory supplies): purchased  Does patient currently own DME?: Yes  What DME does the patient currently own?: Neb[employer]  Does patient have a history of Outpatient Therapy (PT/OT)?: No  Does the patient have a history of Short-Term Rehab?: No  Does patient have a history of HHC?: Yes (agency unknown)  Does patient currently have [facility]?: No         Patient Information Continued  Income Source: SSI/SSD  Does patient have prescription coverage?: No (pt states she purchases)  Within the past 12 months, you worried that your food would run out before you got the money to buy more : Never true  Within the past 12 months, the food you bought just didn't last and you didn't have money to get more : Never true  Food insecurity resource given?: N/A  Does patient receive dialysis treatments?: No  Does patient have a history of substance abuse?: No  Does patient have a history of Mental Health Diagnosis?: Yes  Is patient receiving treatment for mental health?: Yes (follows monthly with psych for anxiety, panic attacks and PTSD)  Has patient received inpatient treatment related to mental health in the last 2 years?: No         Means of Transportation  Means of Transport to Appts[de-identified] Drives Self  In the past 12 months, has lack of transportation kept you from medical appointments or from getting medications?: No  In the past 12 months, has lack of transportation kept you from meetings, work, or from getting things needed for daily living?: No  Was application for public transport provided?: N/A  Cm met with the patient to evaluate the patients prior function and living situation and any barriers to d/c and form a safe d/c plan  Cm also evaluated the patient for any services in the home or needs for services  CM also discussed with patient that their preferences will be taken into account/consideration  Pt admitted with vomiting and diarrhea  Hx-crhons, mast cell activation syndrome  GI consulted  No current discharge needs noted, CM to follow

## 2023-05-30 NOTE — CONSULTS
Consultation - [facility]  Everette Lagunas 46 y o  female MRN: [id number]  Unit/Bed#: 011-17 Encounter: [id number]        Inpatient consult to gastroenterology  Consult performed by: JONATHAN Luis  Consult ordered by: Ilan Dhillon DO          Reason for Consult / Principal Problem:     Vomiting & Vivia Sauger      ASSESSMENT AND PLAN:     Bere Abdul is a 46 y o  female who presented to the ER yesterday with continued nausea, vomiting, and diarrhea that she was not able to manage at home  The patient reports that with the Zofran her nausea and vomiting has improved and is manageable but that she continues with the diarrhea shortly after she eats and for at least 2 to 3 hours after she eats, without any christopher blood or dark tarry stools  She continues with the right greater than left lower abdominal pain  The patient has a complex and chronic GI history and multiple allergies and a severe and significant allergy to corn  Exam:  Oral mucosa normal upon visual inspection, without any sores, lesions, or ulcerations  Sclera without icterus and benign  Lung sounds CTA b/l  Normal S1 & S2 upon exam  Abdomen soft, flat, but moderately tender in the right greater than left lower quadrants with positive BS x 4  No edema noted of the b/l lower extremities noted upon exam today  Skin is non-icteric  1  Vomiting & Diarrhea  2  Chron's disease of colon with complication  3  Immunosuppressed state due to chronic steroid use  4  Mast Cell Activation Syndrome    We discussed with the patient that at this point in time she should continue the Zofran as needed and as prescribed since it does seem to be helping with the nausea and vomiting and to further try to help manage her symptoms we feel it is reasonable to put her back on the Protonix IV 40 mg twice daily as she continues to refuse any are oral medications because of the corn derivatives    We will continue the famotidine 20 mg IV twice daily and upon discharge the plan will be to have her revert back to her normal p o  meds at home  With regards to the methylprednisone 40 mg IV, for now, we will leave that up to the primary care team as to whether or not they want to increase it to twice daily dosing  We will also defer the decision regarding any antibiotics as they feel appropriate at this time      -Continue Stelara as prescribed for now and continue to think about Skyrizi as a future treatment option   -Continue to advance her diet as tolerated  -Continue to f/u with all outpatient specialists as scheduled upon discharge  I discussed with the patient, that if not already scheduled, she should schedule an outpatient f/u with Dr Aissatou Hill in 8-10 weeks upon discharge  The patient was agreeable and verbalized an understanding  GI will continue to follow  ____________________________________________________________________    HPI: Patient is a 46 y o  female who presented to the ER yesterday with continued nausea, vomiting, and diarrhea that she was not able to manage at home  The patient reports that since prepping for her EGD & Colonoscopy, she feels that it has triggered a relapse of her mast cell activation syndrome and chron's disease  The patient reports that the week after her colonoscopy and EGD overall her hydration and status was manageable and was even able to get through taking her qualification exams to become a   She reports that over the last week she was having issues staying hydrated and finally by Friday it was getting to the point that she knew she had to come to the emergency room but was able to hold off until yesterday  The patient reports that she continues with the significant nausea, which is being managed with Zofran but reports that she is still having significant diarrhea especially shortly after she eats and denies any christopher blood or tarry stools    She reports that normally her pain is in her right upper quadrant and now it is more in her right greater than left lower quadrant, which is unusual for her  The patient is known to our practice and is following up outpatient as she is currently on Stelara subcutaneous every 3 weeks, but feels that she does better with the IV infusions and is hoping we can work on getting her Stelara approved as an IV infusion every other week  She reports she is also working on her IgG treatments to be switched to IV infusion instead of subcutaneous as well she reports that with her connective tissue disorder she does not feel that her body absorbs the medication quick enough without it is as helpful as it is when she gets it through the IV  She continues to follow-up with the infusion center at least 2-3 times a week for IV hydration and medications as she continues to have issues with p o  medications because of her corn allergy  The patient reports she continues to think about Skyrizi as discussed and recommended by Dr June Lyle, however, she would like to see if she can get the Stelara approved as an IV infusion every other week first and is still quite hesitant about starting the [employer] as she has previously tried and failed Remicade and Humira as well and is again very hesitant to consider methotrexate  The patient also wants to discuss being put back on her Protonix but would like 40 mg IV twice daily and for us to increase her methylprednisone to 40 mg twice daily IV as she feels she is currently underdosed for a flareup  The patient also feels that her leukocytosis could be better managed with an antibiotic and since she has not had an antibiotic since 2019, was wondering if maybe an antibiotic would be appropriate at this time      PMH/PSH: Crohn's disease, mast cell activation syndrome, hyponatremia, hypothyroidism, chondromalacia, vitamin D deficiency, hypokalemia, fibromyalgia, Ménière's disease, chronic idiopathic urticaria, anorexia, migraines, lupus, Hashimoto's and immunosuppression due to chronic steroid use  Serology: (5/30/23): Platelets, 880, WBC 97 41, ALB, 3 4, AST, 8, Calcium 8 2, otherwise the rest of the blood work was WNL  Meds: Famotidine IV 20 mg BID  Endoscopy History: EGD: (3/7/23): The esophagus appeared normal  Z-line 37 cm from the incisors  Moderate, patchy erythematous mucosa with erosion in the stomach; performed cold forceps biopsy  The duodenum appeared normal      COLONOSCOPY: (3/7/23): The terminal ileum, ascending colon and transverse colon appeared normal  Performed random biopsy using biopsy forceps  Mild, patchy edematous, erythematous and granular mucosa in the descending colon, sigmoid colon and rectum; performed cold forceps biopsy  The examination was otherwise normal on direct and retroflexion views  A  Stomach, cold fcp bx:   -  Chronic inactive oxyntic and antral gastritis  -  Negative for Helicobacter pylori, by H&E stain    -  Negative for intestinal metaplasia, dysplasia or carcinoma       B  Terminal Ileum, cold fcp bx hx of crohns:   - Benign small intestinal mucosa with lymphoid aggregate  - Negative for acute enteritis, granuloma or dysplasia       C  Colon, cold fcp bx hx of crohns-70cm:   -  Benign colonic mucosa with lymphoid aggregates  -  No active colitis, granuloma, dysplasia or carcinoma      D  Colon, cold fcp bx hx of crohns-60cm:   -  Benign colonic mucosa with lymphoid aggregates  -  No active colitis, granuloma, dysplasia or carcinoma      E  Colon, cold fcp bx hx of crohns-50cm:   -  Benign colonic mucosa with lymphoid aggregates  -  No active colitis, granuloma, dysplasia or carcinoma      F  Colon, cold fcp bx hx of crohns-40cm:   -  Chronic active colitis  -  Negative for granuloma, dysplasia or carcinoma       G  Colon, cold fcp bx hx of crohns-30cm:   -  Chronic active colitis  -  Negative for granuloma, dysplasia or carcinoma       H   Colon, cold fcp bx hx of crohns-20cm:   -  Benign colonic mucosa with lymphoid aggregates  -  No active colitis, granuloma, dysplasia or carcinoma      I  Colon, cold fcp bx hx of crohns-10cm:   -  Chronic active colitis  -  Negative for granuloma, dysplasia or carcinoma  REVIEW OF SYSTEMS:    CONSTITUTIONAL: Denies any fever, chills, rigors, and weight loss  HEENT: No earache or tinnitus  Denies hearing loss or visual disturbances  CARDIOVASCULAR: No chest pain or palpitations  RESPIRATORY: Denies any cough, hemoptysis, shortness of breath or dyspnea on exertion  GASTROINTESTINAL: As noted in the History of Present Illness  GENITOURINARY: No problems with urination  Denies any hematuria or dysuria  NEUROLOGIC: No dizziness or vertigo, denies headaches  MUSCULOSKELETAL: Denies any muscle or joint pain  SKIN: Denies skin rashes or itching  ENDOCRINE: Denies excessive thirst  Denies intolerance to heat or cold  PSYCHOSOCIAL: Denies depression or anxiety  Denies any recent memory loss         Historical Information   Past Medical History:   Diagnosis Date   • Anemia 2007   • Anxiety    • Asthma    • Bile duct stricture 2014    Sphincter of oddi dysfunction   • Chronic kidney disease    • Colon polyp 1998   • Crohn's colitis ([facility] [address] )    • [facility]    • Disease of thyroid gland    • 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   • Generalized anxiety disorder 08/26/2017   • GERD (gastroesophageal reflux disease) 1995   • GI (gastrointestinal bleed) 1994   • Heart murmur    • Inflammatory bowel disease    • Irritable bowel syndrome 2016   • Lactose intolerance 1982   • Mast cell disease    • Migraine    • Myocardial infarction [facility])     NSTEMI  pt denies, documented in cardio note   • Pancreatitis     sphinger of    • Psychiatric disorder    • RA (rheumatoid arthritis) ([facility] [address] )    • Seizures ([facility] [address] )    • Ulcerative colitis ([facility] )    • Visual impairment      Past Surgical History:   Procedure Laterality Date   • ABDOMINAL SURGERY  2017 • APPENDECTOMY     • CHOLECYSTECTOMY     • COLONOSCOPY  2019   • EGD AND COLONOSCOPY N/A 09/12/2017    Procedure: EGD AND COLONOSCOPY;  Surgeon: Jeremie Reveles MD;  [facility]: [facility]; Service: Gastroenterology   • ERCP N/A 09/15/2017    Procedure: ENDOSCOPIC RETROGRADE CHOLANGIOPANCREATOGRAPHY (ERCP); Surgeon: Juan Manuel Carvajal MD;  Location: [facility];   Service: Gastroenterology   • HYSTERECTOMY     • KNEE SURGERY Right    • LAPAROSCOPIC ENDOMETRIOSIS FULGURATION     • ORIF ANKLE FRACTURE Left    • MN ARTHRS KNE SURG W/MENISCECTOMY MED/LAT W/SHVG Left 05/12/2017    Procedure: POSSIBLE MEDIAL MENISECTOMY;  Surgeon: Neftali Loco MD;  Location: [facility];  Service: Orthopedics   • MN ARTHRS KNEE DEBRIDEMENT/SHAVING ARTCLR CRTLG Left 05/12/2017    Procedure: KNEE ARTHROSCOPY EVALUATION, CHONDROPLASTY;  Surgeon: Neftali Loco MD;  Location: [facility];  Service: Orthopedics   • MN LAPS ABD PRTM&OMENTUM DX W/WO [address]/ [facility] N/A 12/12/2017    Procedure: LAPAROSCOPY DIAGNOSTIC  WITH LYSIS OF ADHESIONS;  Surgeon: Barrett Quintana DO;  [facility];  Service: General   • UPPER GASTROINTESTINAL ENDOSCOPY  2019     Social History   Social History     Substance and Sexual Activity   Alcohol Use Never     Social History     Substance and Sexual Activity   Drug Use Never     Social History     Tobacco Use   Smoking Status Never   Smokeless Tobacco Never     Family History   Problem Relation Age of Onset   • Ulcerative colitis Mother    • Arthritis Mother    • Colon polyps Mother    • Heart failure Father    • Neuropathy Father    • Macular degeneration Father    • Diabetes Father    • Early death Father    • Vision loss Father    • Asthma Daughter    • Hypothyroidism Daughter    • Heart disease Maternal Grandmother    • Arthritis Maternal Grandmother    • No Known Problems Maternal Grandfather    • Arthritis Paternal Grandmother    • Macular degeneration Paternal Grandmother    • Vision loss Paternal Grandmother    • "Lymphoma Paternal Grandfather    • Cancer Paternal Grandfather    • Early death Paternal Grandfather    • Breast cancer Maternal Aunt    • Cancer Maternal Aunt    • Miscarriages / Stillbirths Maternal Aunt    • Heart failure Paternal Aunt    • Heart failure Paternal Aunt    • Irritable bowel syndrome Paternal [de-identified]    • Breast cancer Paternal Aunt    • Breast cancer additional onset Paternal [de-identified]    • Hypothyroidism Paternal Aunt    • Cancer Paternal Aunt    • No Known Problems Son    • No Known Problems Son    • Kidney disease Brother    • Depression Paternal Uncle    • Early death Paternal Uncle        Meds/Allergies     Medications Prior to Admission   Medication   • B-D 3CC LUER-SHAMEKA SYR 25GX1\" 25G X 1\" 3 ML MISC   • budesonide (ENTOCORT EC) 3 MG capsule   • butalbital-acetaminophen-caffeine (FIORICET,ESGIC) -40 mg per tablet   • cetirizine (ZyrTEC) 10 mg tablet   • Cholecalciferol 10 MCG /0 025ML LIQD   • cyanocobalamin (VITAMIN B-12) 100 mcg tablet   • cyanocobalamin 1,000 mcg/mL   • diclofenac (VOLTAREN) 75 mg EC tablet   • diphenhydrAMINE (BENADRYL) 25 mg tablet   • diphenhydrAMINE (BENADRYL) 50 mg/mL   • furosemide (LASIX) 20 mg tablet   • Lactobacillus (PROBIOTIC ACIDOPHILUS PO)   • Lactobacillus-Inulin (Culturelle Digestive [employer]) CAPS   • levalbuterol (XOPENEX HFA) 45 mcg/act inhaler   • levothyroxine 100 mcg tablet   • LORazepam (ATIVAN) 1 mg tablet   • NON FORMULARY   • nystatin (MYCOSTATIN) 500,000 units/5 mL suspension   • omalizumab (XOLAIR) 150 mg   • pantoprazole (PROTONIX) 40 mg tablet   • Potassium Chloride 10 % SOLN   • predniSONE 20 mg tablet   • Riboflavin (Vitamin B-2) 100 MG TABS   • Riboflavin (Vitamin B-2) 100 MG TABS   • Stelara 90 MG/ML subcutaneous injection   • [place]Lite Lancets [facility]     Current Facility-Administered Medications   Medication Dose Route Frequency   • acetaminophen (TYLENOL) tablet 650 mg  650 mg Oral Q6H PRN   • albuterol (PROVENTIL HFA,VENTOLIN HFA) inhaler 2 puff " 2 puff Inhalation Q4H PRN   • budesonide (ENTOCORT EC) capsule 3 mg  3 mg Oral TID   • butalbital-acetaminophen-caffeine (FIORICET,ESGIC) -40 mg per tablet 1 tablet  1 tablet Oral Q8H PRN   • cholecalciferol (VITAMIN D) oral liquid 1,000 Units  1,000 Units Oral BID   • diphenhydrAMINE (BENADRYL) injection 50 mg  50 mg Intramuscular Q6H   • enoxaparin (LOVENOX) subcutaneous injection 40 mg  40 mg Subcutaneous Daily   • Famotidine (PF) (PEPCID) injection 20 mg  20 mg Intravenous Q12H [address]   • HYDROmorphone (DILAUDID) injection 0 5 mg  0 5 mg Intravenous Q4H PRN   • levothyroxine tablet 100 mcg  100 mcg Oral Early Morning   • LORazepam (ATIVAN) injection 1 mg  1 mg Intravenous Q4H PRN   • magnesium sulfate 2 g/50 mL IVPB (premix) 2 g  2 g Intravenous Once   • methylPREDNISolone sodium succinate (Solu-MEDROL) injection 40 mg  40 mg Intravenous Daily   • ondansetron (ZOFRAN) injection 4 mg  4 mg Intravenous Q6H PRN   • potassium-sodium phosphateS (K-PHOS,PHOSPHA 250) -250 mg tablet 1 tablet  1 tablet Oral TID With Meals   • predniSONE tablet 20 mg  20 mg Oral Daily PRN   • sodium chloride 0 9 % infusion  100 mL/hr Intravenous Continuous       Allergies   Allergen Reactions   • Aimovig [Erenumab-Aooe] Anaphylaxis   • Amitriptyline Anaphylaxis     According to the patient she had nphylaxis   • Aspergillus Allergy Skin Test Other (See Comments), Hives and Swelling   • Azathioprine Other (See Comments) and Anaphylaxis     pancreatitis  According to patient she needed Epipen   • Banana - Food Allergy Itching, Swelling and Anaphylaxis   • Buspar [Buspirone] Hives and Shortness Of Breath   • Citric Acid-Polysorbate 80 Abdominal Pain, Anaphylaxis, Anxiety, Bradycardia, Diarrhea, Fatigue, GI Intolerance, Headache, Hives, Hyperactivity, Itching, Lip Swelling, Nausea Only, Palpitations, Rash, Shortness Of Breath, Tachycardia, Throat Swelling, Tongue Swelling and Vomiting   • Corn Dextrin Anaphylaxis     Any corn based "products   • Eggs Or Egg-Derived Products - Food Allergy Anaphylaxis   • Epinephrine Anaphylaxis   • Erenumab Anaphylaxis     patient sent portal message stating she had anaphylaxis  patient sent portal message stating she had anaphylaxis     • Gadolinium Abdominal Pain, Anaphylaxis, Anxiety, Confusion, Delirium, Dizziness, Eye Swelling, Fatigue, GI Intolerance, Headache, Hives, Irritability, Itching, Lip Swelling, Nausea Only, Palpitations, Photosensitivity, Rash, Seizures, Shortness Of Breath, Swelling, Syncope, Throat Swelling, Tongue Swelling, Tremor, Visual Disturbance and Vomiting   • Gelatin - Food Allergy Anaphylaxis   • Heparin Hives     Painful welts    • Lactated Ringers Shortness Of Breath     Pt questions this allergy, pt has received LR multiple times without reaction   • Lavender Oil Anaphylaxis     Other reaction(s): anaphalxis   • Malto Dextrin [Dextrin] Anaphylaxis   • Other Anaphylaxis     Gel caps, maltodextrose, dextrose,grapes, lavender    • Penicillium Notatum Shortness Of Breath and Swelling   • Sumatriptan Anaphylaxis     Bradycardia, sob, back pressure, head pain,throat tightness  According to the patient she had anphylaxis   • Ustekinumab Anaphylaxis   • Contrast [Iodinated Contrast Media] Other (See Comments)     Pt states needs to be premedicated prior to injection   • Corn Oil - Food Allergy - Food Allergy Hives   • Humira [Adalimumab] Other (See Comments)     \"I develop drug induced lupus\"   • Ibuprofen Hives and Throat Swelling   • Polyethylene Glycol Diarrhea and Vomiting   • Red Dye - Food Allergy Other (See Comments)     intolerance   • Remicade [Infliximab] Other (See Comments)     \"I develop drug induced lupus\"   • Soy Allergy - Food Allergy Other (See Comments)   • Sulfa Antibiotics Hives and Other (See Comments)   • Sulfate Hives   • Sulfites - Food Allergy Hives   • Chlorhexidine Itching   • Freederm Adhesive Remover [New Skin] Rash   • Histamine Hives, Rash and Other (See " "Comments)     Intolerance             Objective     Blood pressure 101/66, pulse 75, temperature 98 2 °F (36 8 °C), resp  rate 15, height 5' 5\" (1 651 m), weight 66 5 kg (146 lb 9 7 oz), SpO2 95 %, not currently breastfeeding  Body mass index is 24 4 kg/m²  Intake/Output Summary (Last 24 hours) at 5/30/2023 1006  Last data filed at 5/30/2023 0607  Gross per 24 hour   Intake 2181 67 ml   Output --   Net 2181 67 ml         PHYSICAL EXAM:      General Appearance:   Alert, cooperative, no distress   HEENT:   Normocephalic, atraumatic, anicteric  Neck:  Supple, symmetrical, trachea midline   Lungs:   Clear to auscultation bilaterally; no rales, rhonchi or wheezing; respirations unlabored    Heart[de-identified]   Regular rate and rhythm; no murmur, rub, or gallop     Abdomen:   Soft, non-tender, non-distended; normal bowel sounds; no masses, no organomegaly    Genitalia:   Deferred    Rectal:   Deferred    Extremities:  No cyanosis, clubbing or edema    Pulses:  2+ and symmetric all extremities    Skin:  No jaundice, rashes, or lesions    Lymph nodes:  No palpable cervical lymphadenopathy        Lab Results:   Admission on 05/29/2023   Component Date Value   • WBC 05/29/2023 13 25 (H)    • RBC 05/29/2023 4 91    • Hemoglobin 05/29/2023 15 1    • Hematocrit 05/29/2023 44 8    • MCV 05/29/2023 91    • MCH 05/29/2023 30 8    • MCHC 05/29/2023 33 7    • RDW 05/29/2023 13 7    • MPV 05/29/2023 8 6 (L)    • Platelets 89/14/4092 424 (H)    • nRBC 05/29/2023 0    • Neutrophils Relative 05/29/2023 92 (H)    • Immat GRANS % 05/29/2023 1    • Lymphocytes Relative 05/29/2023 6 (L)    • Monocytes Relative 05/29/2023 1 (L)    • Eosinophils Relative 05/29/2023 0    • Basophils Relative 05/29/2023 0    • Neutrophils Absolute 05/29/2023 12 33 (H)    • Immature Grans Absolute 05/29/2023 0 07    • Lymphocytes Absolute 05/29/2023 0 73    • Monocytes Absolute 05/29/2023 0 07 (L)    • Eosinophils Absolute 05/29/2023 0 01    • Basophils Absolute " 05/29/2023 0 04    • Sodium 05/29/2023 133 (L)    • Potassium 05/29/2023 3 6    • Chloride 05/29/2023 100    • CO2 05/29/2023 20 (L)    • ANION GAP 05/29/2023 13    • BUN 05/29/2023 9    • Creatinine 05/29/2023 0 85    • Glucose 05/29/2023 207 (H)    • Calcium 05/29/2023 9 2    • AST 05/29/2023 12 (L)    • ALT 05/29/2023 10    • Alkaline Phosphatase 05/29/2023 61    • Total Protein 05/29/2023 6 5    • Albumin 05/29/2023 3 6    • Total Bilirubin 05/29/2023 0 57    • eGFR 05/29/2023 80    • Lipase 05/29/2023 <6 (L)    • TSH 3RD GENERATON 05/29/2023 1 889    • Sodium 05/29/2023 137    • Potassium 05/29/2023 4 5    • Chloride 05/29/2023 104    • CO2 05/29/2023 22    • ANION GAP 05/29/2023 11    • BUN 05/29/2023 7    • Creatinine 05/29/2023 0 78    • Glucose 05/29/2023 186 (H)    • Glucose, Fasting 05/29/2023 186 (H)    • Calcium 05/29/2023 8 9    • eGFR 05/29/2023 88    • WBC 05/30/2023 13 84 (H)    • RBC 05/30/2023 4 30    • Hemoglobin 05/30/2023 12 7    • Hematocrit 05/30/2023 39 9    • MCV 05/30/2023 93    • MCH 05/30/2023 29 5    • MCHC 05/30/2023 31 8    • RDW 05/30/2023 14 1    • MPV 05/30/2023 9 0    • Platelets 48/43/9735 400 (H)    • nRBC 05/30/2023 0    • Neutrophils Relative 05/30/2023 70    • Immat GRANS % 05/30/2023 0    • Lymphocytes Relative 05/30/2023 16    • Monocytes Relative 05/30/2023 12    • Eosinophils Relative 05/30/2023 1    • Basophils Relative 05/30/2023 1    • Neutrophils Absolute 05/30/2023 9 78 (H)    • Immature Grans Absolute 05/30/2023 0 04    • Lymphocytes Absolute 05/30/2023 2 22    • Monocytes Absolute 05/30/2023 1 64 (H)    • Eosinophils Absolute 05/30/2023 0 09    • Basophils Absolute 05/30/2023 0 07    • Sodium 05/30/2023 140    • Potassium 05/30/2023 3 6    • Chloride 05/30/2023 108    • CO2 05/30/2023 25    • ANION GAP 05/30/2023 7    • BUN 05/30/2023 11    • Creatinine 05/30/2023 0 90    • Glucose 05/30/2023 88    • Calcium 05/30/2023 8 2 (L)    • Corrected Calcium 05/30/2023 8 7 • AST 05/30/2023 8 (L)    • ALT 05/30/2023 9    • Alkaline Phosphatase 05/30/2023 49    • Total Protein 05/30/2023 5 9 (L)    • Albumin 05/30/2023 3 4 (L)    • Total Bilirubin 05/30/2023 0 45    • eGFR 05/30/2023 74    • Magnesium 05/30/2023 1 8 (L)    • Procalcitonin 05/30/2023 <0 05    • Phosphorus 05/30/2023 1 3 (L)        Imaging Studies: I have personally reviewed pertinent imaging studies

## 2023-05-30 NOTE — PROGRESS NOTES
[address]  Progress Note  Name: Ezekiel Ramirez  MRN: [id number]  Unit/Bed#: 051-09 I Date of Admission: 5/29/2023   Date of Service: 5/30/2023 I Hospital Day: 1    Assessment/Plan   Hyponatremia  Assessment & Plan  Sodium 133 on arrival  · Likely hypovolemic hyponatremia in setting of vomiting and diarrhea  · Received normal saline 1 5 L bolus in ED  · Continue IV normal saline 100 cc/h for rehydration  · Resolved with fluids    Crohn's disease of colon with complication (HCC)  Assessment & Plan  Stable, no bloody diarrhea  Continue home budesonide 3 mg 3 times daily [nonformulary, patient does have medication with her] and prednisone 20 mg daily as needed  On Stelara and omalizumab infusion treatment with IV hydration-next scheduled on 5/30/2023  Patient missed all infusions this past week  Continue Solu-Medrol  GI Consult    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[facility] [address] )  Assessment & Plan  Follows with mast cell activation specialist at the [facility]  Continue Benadryl 50 mg twice daily, Protonix 40 mg twice daily    Hypothyroidism (acquired)  Assessment & Plan  TSH WNL 1 889  Continue levothyroxine 100 mcg daily    Vomiting and diarrhea  Assessment & Plan  Ongoing nonbloody nonbilious emesis and nonbloody diarrhea since EGD colonoscopy on 5/2/2023  · Reports this has been worsening over the past week  · Vomiting improved with IV Zofran-continue p o  as needed  · C  difficile ordered in ED per patient request though low to no suspicion as no prolonged hospitalization recently, no recent antibiotic use, no prior history of infection  · Received 1 5 L normal saline bolus in ED, continue normal saline 100 cc/h for maintenance  · Leukocytosis likely stress response vs viral gastroenteritis as symptoms worsening over the past week after resolution  · GI consulted as per patient GI symptoms have been ongoing since scoping on 5/2/2023  · Stool C  difficile and culture are pending  · Replace electrolytes as "needed    * Decreased oral intake  Assessment & Plan  Decreased p o  intake for the past week due to persistent vomiting and diarrhea  · Symptomatic management vomiting and diarrhea as below to help moderate oral intake  · Start with surgical soft diet  · Encourage p o  intake is much as possible  · Appears to have a 6 pound weight loss over the past month when weight is trended         Labs & Imaging: I have personally reviewed pertinent reports  VTE Prophylaxis: in place  Code Status:   Level 1 - Full Code    Patient Centered Rounds: I have performed bedside rounds with nursing staff today  Discussions with Specialists or Other Care Team Provider: GI    Education and Discussions with Family / Patient: Patient states she will update her   I offered to call    Current Length of Stay: 1 day(s)    Current Patient Status: Inpatient   Certification Statement: The patient will continue to require additional inpatient hospital stay due to see my assessment and plan  Subjective:   Patient is seen and examined at bedside  Complains of nausea, diarrhea, and abdominal pain  Afebrile  All other ROS are negative  Objective:    Vitals: Blood pressure 131/89, pulse 87, temperature 97 8 °F (36 6 °C), resp  rate 16, height 5' 5\" (1 651 m), weight 66 5 kg (146 lb 9 7 oz), SpO2 97 %, not currently breastfeeding  ,Body mass index is 24 4 kg/m²  SPO2 RA Rest    Flowsheet Row ED to Hosp-Admission (Current) from 5/29/2023 in [facility] 93 Unit   SpO2 97 %   SpO2 Activity At Rest   O2 Device None (Room air)   O2 Flow Rate --        I&O:     Intake/Output Summary (Last 24 hours) at 5/30/2023 0622  Last data filed at 5/30/2023 1577  Gross per 24 hour   Intake 2181 67 ml   Output --   Net 2181 67 ml       Physical Exam:    General- Alert, sitting comfortably in bed  Not in any acute distress    Neck- Supple, No JVD  CVS- regular, S1 and S2 normal  Chest- Bilateral Air entry, No rhochi, " crackles or wheezing present  Abdomen- soft, tender, not distended, no guarding or rigidity, BS+  Extremities-  No pedal edema, No calf tenderness  CNS-   Alert, awake and orientedx3  No focal deficits present  Invasive Devices     Peripheral Intravenous Line  Duration           Peripheral IV 05/29/23 Ventral (anterior); Left Forearm <1 day                      Social History  reviewed  Family History   Problem Relation Age of Onset   • Ulcerative colitis Mother    • Arthritis Mother    • Colon polyps Mother    • Heart failure Father    • Neuropathy Father    • Macular degeneration Father    • Diabetes Father    • Early death Father    • Vision loss Father    • Asthma Daughter    • Hypothyroidism Daughter    • Heart disease Maternal Grandmother    • Arthritis Maternal Grandmother    • No Known Problems Maternal Grandfather    • Arthritis Paternal Grandmother    • Macular degeneration Paternal Grandmother    • Vision loss Paternal Grandmother    • Lymphoma Paternal Grandfather    • Cancer Paternal Grandfather    • Early death Paternal Grandfather    • Breast cancer Maternal Aunt    • Cancer Maternal Aunt    • Miscarriages / Stillbirths Maternal Aunt    • Heart failure Paternal Aunt    • Heart failure Paternal Aunt    • Irritable bowel syndrome Paternal [de-identified]    • Breast cancer Paternal Aunt    • Breast cancer additional onset Paternal [de-identified]    • Hypothyroidism Paternal Aunt    • Cancer Paternal Aunt    • No Known Problems Son    • No Known Problems Son    • Kidney disease Brother    • Depression Paternal Uncle    • Early death Paternal Uncle     reviewed    Meds:  Current Facility-Administered Medications   Medication Dose Route Frequency Provider Last Rate Last Admin   • acetaminophen (TYLENOL) tablet 650 mg  650 mg Oral Q6H PRN Betzy Grigsby, DO       • albuterol (PROVENTIL HFA,VENTOLIN HFA) inhaler 2 puff  2 puff Inhalation Q4H PRN Betzy Grigsby, DO       • budesonide (ENTOCORT EC) capsule 3 mg  3 mg Oral TID "Natasha Brady DO       • butalbital-acetaminophen-caffeine ([facility]) -40 mg per tablet 1 tablet  1 tablet Oral Q8H PRN Natasha Brady DO       • cholecalciferol (VITAMIN D) oral liquid 1,000 Units  1,000 Units Oral BID Betzy Grigsby DO       • diphenhydrAMINE (BENADRYL) injection 50 mg  50 mg Intramuscular Q6H Reyes Archibald MD   50 mg at 05/30/23 0526   • enoxaparin (LOVENOX) subcutaneous injection 40 mg  40 mg Subcutaneous Daily Betzy Grigsby DO       • Famotidine (PF) (PEPCID) injection 20 mg  20 mg Intravenous Q12H Shade Drake MD   20 mg at 05/29/23 2318   • HYDROmorphone (DILAUDID) injection 0 5 mg  0 5 mg Intravenous Q4H PRN Ceasar Tripp DO   0 5 mg at 05/30/23 9991   • levothyroxine tablet 100 mcg  100 mcg Oral Early Morning Betzy Grigsby DO       • LORazepam (ATIVAN) injection 1 mg  1 mg Intravenous Q4H PRN Ceasar Tripp DO   1 mg at 05/30/23 5551   • magnesium sulfate 2 g/50 mL IVPB (premix) 2 g  2 g Intravenous Once Reyes Archibald MD       • methylPREDNISolone sodium succinate (Solu-MEDROL) injection 40 mg  40 mg Intravenous Daily Reyes Archibald MD       • ondansetron (ZOFRAN) injection 4 mg  4 mg Intravenous Q6H PRN Reyes Archibald MD   4 mg at 05/30/23 0426   • potassium-sodium phosphateS (K-PHOS,PHOSPHA 250) -250 mg tablet 1 tablet  1 tablet Oral TID With Meals Reyes Archibald MD       • predniSONE tablet 20 mg  20 mg Oral Daily PRN Natasha Brady DO       • sodium chloride 0 9 % infusion  100 mL/hr Intravenous Continuous Betzy Grigsby  mL/hr at 05/30/23 0607 100 mL/hr at 05/30/23 0607      Medications Prior to Admission   Medication   • B-D 3CC LUER-SHAMEKA SYR 25GX1\" 25G X 1\" 3 ML MISC   • budesonide (ENTOCORT EC) 3 MG capsule   • butalbital-acetaminophen-caffeine (FIORICET,ESGIC) -40 mg per tablet   • cetirizine (ZyrTEC) 10 mg tablet   • Cholecalciferol 10 MCG /0 025ML LIQD   • cyanocobalamin (VITAMIN B-12) 100 mcg tablet " • cyanocobalamin 1,000 mcg/mL   • diclofenac (VOLTAREN) 75 mg EC tablet   • diphenhydrAMINE (BENADRYL) 25 mg tablet   • diphenhydrAMINE (BENADRYL) 50 mg/mL   • furosemide (LASIX) 20 mg tablet   • Lactobacillus (PROBIOTIC ACIDOPHILUS PO)   • Lactobacillus-Inulin ([facility]) CAPS   • levalbuterol (XOPENEX HFA) 45 mcg/act inhaler   • levothyroxine 100 mcg tablet   • LORazepam (ATIVAN) 1 mg tablet   • NON FORMULARY   • nystatin (MYCOSTATIN) 500,000 units/5 mL suspension   • omalizumab (XOLAIR) 150 mg   • pantoprazole (PROTONIX) 40 mg tablet   • Potassium Chloride 10 % SOLN   • predniSONE 20 mg tablet   • Riboflavin (Vitamin B-2) 100 MG TABS   • Riboflavin (Vitamin B-2) 100 MG TABS   • Stelara 90 MG/ML subcutaneous injection   • TechLite Lancets [facility]       Labs:  Results from last 7 days   Lab Units 05/30/23  0504 05/29/23  0252   EOS PCT % 1 0   HEMATOCRIT % 39 9 44 8   HEMOGLOBIN g/dL 12 7 15 1   LYMPHS PCT % 16 6*   MONOS PCT % 12 1*   NEUTROS PCT % 70 92*   PLATELETS Thousands/uL 400* 424*   WBC Thousand/uL 13 84* 13 25*     Results from last 7 days   Lab Units 05/30/23  0504 05/29/23  0943 05/29/23  0314   ALK PHOS U/L 49  --  61   ALT U/L 9  --  10   AST U/L 8*  --  12*   BUN mg/dL 11 7 9   CALCIUM mg/dL 8 2* 8 9 9 2   CHLORIDE mmol/L 108 104 100   CO2 mmol/L 25 22 20*   CREATININE mg/dL 0 90 0 78 0 85   POTASSIUM mmol/L 3 6 4 5 3 6     Lab Results   Component Value Date    CKTOTAL 54 10/25/2019    CKTOTAL 28 02/14/2019    TROPONINI <0 02 06/11/2021    TROPONINI <0 02 03/03/2021    TROPONINI <0 02 12/07/2020    TROPONINI <0 04 12/30/2014         Lab Results   Component Value Date    BLOODCX No Growth After 5 Days  06/11/2021    BLOODCX No Growth After 5 Days  06/11/2021    BLOODCX No Growth After 5 Days   09/11/2019    URINECX No Growth <1000 cfu/mL 11/01/2019    URINECX No Growth <1000 cfu/mL 08/27/2019    URINECX No Growth <1000 cfu/mL 08/25/2017         Imaging:  Results for orders placed during the hospital encounter of 06/11/21    XR chest portable    Narrative  CHEST    INDICATION:   Sepsis eval     COMPARISON:  12/7/2020    EXAM PERFORMED/VIEWS:  XR CHEST PORTABLE  Single view    FINDINGS:    Cardiomediastinal silhouette appears unremarkable  The lungs are clear  No pneumothorax or pleural effusion  Osseous structures appear within normal limits for patient age  Impression  No acute cardiopulmonary disease  Findings are stable        Workstation performed: [id number]    No results found for this or any previous visit        Last 24 Hours Medication List:   Current Facility-Administered Medications   Medication Dose Route Frequency Provider Last Rate   • acetaminophen  650 mg Oral Q6H PRN Mick Common, DO     • albuterol  2 puff Inhalation Q4H PRN Betzy Grigsby, DO     • budesonide  3 mg Oral TID Mick Common, DO     • butalbital-acetaminophen-caffeine  1 tablet Oral Q8H PRN Mick Common, DO     • cholecalciferol  1,000 Units Oral BID Mick Common, DO     • diphenhydrAMINE  50 mg Intramuscular Q6H Sg Aleman MD     • enoxaparin  40 mg Subcutaneous Daily Mick Common, DO     • famotidine  20 mg Intravenous Q12H Shade Drake MD     • HYDROmorphone  0 5 mg Intravenous Q4H PRN Tolu Tripp DO     • levothyroxine  100 mcg Oral Early Morning Betzy Grigsby DO     • LORazepam  1 mg Intravenous Q4H PRN Tolu Tripp DO     • magnesium sulfate  2 g Intravenous Once Sg Aleman MD     • methylPREDNISolone sodium succinate  40 mg Intravenous Daily Sg Aleman MD     • ondansetron  4 mg Intravenous Q6H PRN Sg Aleman MD     • potassium-sodium phosphateS  1 tablet Oral TID With Meals Sg Aleman MD     • predniSONE  20 mg Oral Daily PRN Mick Common, DO     • sodium chloride  100 mL/hr Intravenous Continuous Mick Common,  mL/hr (05/30/23 1626)        Today, Patient Was Seen By: Sg Aleman MD    ** Please Note: Dictation voice to text software may have been used in the creation of this document   **

## 2023-05-30 NOTE — UTILIZATION REVIEW
"    Initial Clinical Review    Admission: Date/Time/Statement:   Admission Orders (From admission, onward)     Ordered        05/29/23 0958  Inpatient Admission  Once                      • Inpatient Admission     Standing Status:   Standing     Number of Occurrences:   1     Order Specific Question:   Level of Care     Answer:   Med Surg [16]     Order Specific Question:   Estimated length of stay     Answer:   More than 2 Midnights     Order Specific Question:   Certification     Answer:   I certify that inpatient services are medically necessary for this patient for a duration of greater than two midnights  See H&P and MD Progress Notes for additional information about the patient's course of treatment  ED Arrival Information     Expected   -    Arrival   5/29/2023 02:03    Acuity   Urgent            Means of arrival   Walk-In    Escorted by   Self    Service   Hospitalist    Admission type   Emergency            Arrival complaint   Allergic Reaction,Back Pain            Chief Complaint   Patient presents with   • Dehydration     Patient reports being dehydrated since her colonoscopy  Patient states she also needs a new epi pen  Initial Presentation: 46 y o  female presents to ed from home for evaluation and treatment of dehydration after a recent colonoscopy due to vomiting and diarrhea  PMHX: CHRON'S COLITIS, RA, MAST CELL ACTIVATION SYNDROME  On Stelara  Receives regular iv fluid infusions  Possible C diff exposure at infusion center  She has come in for infusion this past week  Clinical assessment significant for tachycardia, generalized abdominal tenderness  WBC 13 25  Patient refuses po medication due to history of \"corn allergy  \"  Patient reports she is having a seizure with shortness of breath at 9am  Patient noted to be holding her breath causing decreased O2 sat 94%  Initially treated with iv zofran x1, iv  9% ns bolus, iv solumedrol, iv fluids 100/hr, iv pepcid, iv ativan x1    " Admit to inpatient for decreased oral intake  Date: 5- 30-23   Day 2: inpatient med surg    Mag 1 8  Phos 1 3 Wbc 13 84  Consult gastroenterology completed  Continue Stelara  Advance diet from soft  Patient refuses oral medications  Started iv solumedrol, iv pepcid, iv mag, k phos tid  Continue iv fluids 100/hr  Neurology consulted due to history of seizures  Awake or drowsy routine EEG ordered  ED Triage Vitals [05/29/23 0216]   98 4 °F (36 9 °C) (!) 106 18 150/98 96 %      Temporal Monitor           No Pain          05/29/23 66 5 kg (146 lb 9 7 oz)     Additional Vital Signs:     Date/Time Temp Pulse Resp BP MAP (mmHg) SpO2 O2 Device   05/30/23 0800 98 2 °F (36 8 °C) 73 15 101/66 78 99 % None (Room air)   05/30/23 07:57:46 98 2 °F (36 8 °C) 75 15 101/66 78 95 % --   05/29/23 15:14:09 97 8 °F (36 6 °C) 87 16 131/89 103 97 % --   05/29/23 1100 -- 97 -- 133/88 103 97 % --   05/29/23 0730 97 8 °F (36 6 °C) 106   Abnormal  16 121/84 98 96 % None (Room air)   05/29/23 0216 98 4 °F (36 9 °C) 106   Abnormal  18 150/98 119 96 % None (Room air)         Pertinent Labs/Diagnostic Test Results:     CT head wo contrast   Final  (05/29 1437)      No acute intracranial abnormality              Results from last 7 days   Lab Units 05/30/23  0504 05/29/23  0252   HEMATOCRIT % 39 9 44 8   HEMOGLOBIN g/dL 12 7 15 1   NEUTROS ABS Thousands/µL 9 78* 12 33*   PLATELETS Thousands/uL 400* 424*   WBC Thousand/uL 13 84* 13 25*         Results from last 7 days   Lab Units 05/30/23  0504 05/29/23  0943 05/29/23  0314   ANION GAP mmol/L 7 11 13   BUN mg/dL 11 7 9   CALCIUM mg/dL 8 2* 8 9 9 2   CHLORIDE mmol/L 108 104 100   CO2 mmol/L 25 22 20*   CREATININE mg/dL 0 90 0 78 0 85   EGFR ml/min/1 73sq m 74 88 80   POTASSIUM mmol/L 3 6 4 5 3 6   MAGNESIUM mg/dL 1 8*  --   --    PHOSPHORUS mg/dL 1 3*  --   --    SODIUM mmol/L 140 137 133*     Results from last 7 days   Lab Units 05/30/23  0504 05/29/23  0314   ALBUMIN g/dL 3 4* 3  6   ALK PHOS U/L 49 61   ALT U/L 9 10   AST U/L 8* 12*   TOTAL BILIRUBIN mg/dL 0 45 0 57   TOTAL PROTEIN g/dL 5 9* 6 5         Results from last 7 days   Lab Units 05/30/23  0504 05/29/23  0943 05/29/23  0314   GLUCOSE RANDOM mg/dL 88 186* 207*       Results from last 7 days   Lab Units 05/29/23  0314   TSH 3RD GENERATON uIU/mL 1 889     Results from last 7 days   Lab Units 05/30/23  0504   PROCALCITONIN ng/ml <0 05       Results from last 7 days   Lab Units 05/29/23  0314   LIPASE u/L <6*       Results from last 7 days   Lab Units 05/29/23  2234   C DIFF TOXIN B BY PCR  Negative     Results from last 7 days   Lab Units 05/29/23  2234   CAMPYLOBACTER SP (JEJUNI AND COLI)  None Detected   SALMONELLA SP PCR  None Detected   SHIGELLA SP/ENTEROINVASIVE E  COLI (EIEC)  None Detected   SHIGA TOXIN 1/SHIGA TOXIN 2  None Detected       ED Treatment:   Medication Administration from 05/29/2023 0202 to 05/29/2023 1032       Date/Time Order Dose Route Action     05/29/2023 0258 EDT sodium chloride 0 9 % bolus 1,500 mL 1,500 mL Intravenous New Bag     05/29/2023 0256 EDT ondansetron (ZOFRAN) injection 4 mg 4 mg Intravenous Given     05/29/2023 0453 EDT methylPREDNISolone sodium succinate (Solu-MEDROL) injection 60 mg 60 mg Intravenous Given     05/29/2023 8798 EDT sodium chloride 0 9 % infusion 100 mL/hr Intravenous New Bag     05/29/2023 0946 EDT Famotidine (PF) (PEPCID) injection 20 mg 20 mg Intravenous Given     05/29/2023 1015 EDT methylPREDNISolone sodium succinate (Solu-MEDROL) injection 40 mg 40 mg Intravenous Given     05/29/2023 1015 EDT LORazepam (ATIVAN) injection 1 mg 1 mg Intravenous Given        Past Medical History:   Diagnosis Date   • Anemia 2007   • Anxiety    • Asthma    • Bile duct stricture 2014    Sphincter of oddi dysfunction   • Chronic kidney disease    • Colon polyp 1998   • Crohn's colitis ([facility] [address] )    • [facility]    • Disease of thyroid gland    • 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)     NSTEMI  pt denies, documented in cardio note   • Pancreatitis     sphinger of    • Psychiatric disorder    • RA (rheumatoid arthritis) ([place] [address] )    • Seizures (HCC)    • Ulcerative colitis ([place] [address] )    • Visual impairment      Present on Admission:  • 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HCC)  • Crohn's disease of colon with complication ([place] [address] )  • Hyponatremia  • Hypothyroidism (acquired)      Admitting Diagnosis:     Dehydration [E86 0]  Seizure ([place] [address] ) [R56 9]  Crohn's colitis ([place] [address] ) [K50 10]  Nausea vomiting and diarrhea [R11 2, R19 7]    Age/Sex: 46 y o  female    Scheduled Medications:    budesonide, 3 mg, Oral, TID  cholecalciferol, 1,000 Units, Oral, BID  diphenhydrAMINE, 50 mg, Intramuscular, Q6H  enoxaparin, 40 mg, Subcutaneous, Daily  famotidine, 20 mg, Intravenous, Q12H [address]  levothyroxine, 100 mcg, Oral, Early Morning  methylPREDNISolone sodium succinate, 40 mg, Intravenous, Daily  potassium-sodium phosphateS, 1 tablet, Oral, TID With Meals      Continuous IV Infusions:  sodium chloride, 100 mL/hr, Intravenous, Continuous      PRN Meds:  acetaminophen, 650 mg, Oral, Q6H PRN  albuterol, 2 puff, Inhalation, Q4H PRN  butalbital-acetaminophen-caffeine, 1 tablet, Oral, Q8H PRN  HYDROmorphone, 0 5 mg, Intravenous, Q4H PRN  LORazepam, 1 mg, Intravenous, Q4H PRN  ondansetron, 4 mg, Intravenous, Q6H PRN  predniSONE, 20 mg, Oral, Daily PRN        IP CONSULT TO GASTROENTEROLOGY  IP CONSULT TO NEUROLOGY    Network Utilization Review Department  ATTENTION: Please call with any questions or concerns to [phone] and carefully listen to the prompts so that you are directed to the right person   All voicemails are confidential   Alexander Cook all requests for admission clinical reviews, approved or denied determinations and any other requests to dedicated fax number below belonging to the campus where the patient is receiving treatment   List of dedicated fax numbers for the Facilities:  [address] DENIALS (Administrative/Medical Necessity) [phone]   [address] (Maternity/NICU/Pediatrics) [phone]   [address] Cordelia Milligan [phone]   Johnyariel Sethi  [phone]   [address] [place] [address] [phone]   [address] [facility] [address] [facility] [phone]

## 2023-05-30 NOTE — QUICK NOTE
Discussed with On-call neuro, given no obvious reason for consult, will dc consult and if needed will reach out to neuro

## 2023-05-31 ENCOUNTER — HOSPITAL ENCOUNTER (OUTPATIENT)
Dept: INFUSION CENTER | Facility: HOSPITAL | Age: 48
Discharge: HOME | End: 2023-05-31
Attending: INTERNAL MEDICINE

## 2023-05-31 ENCOUNTER — APPOINTMENT (INPATIENT)
Dept: NEUROLOGY | Facility: HOSPITAL | Age: 48
End: 2023-05-31
Attending: INTERNAL MEDICINE
Payer: COMMERCIAL

## 2023-05-31 PROBLEM — E87.1 HYPONATREMIA: Status: RESOLVED | Noted: 2021-03-11 | Resolved: 2023-05-31

## 2023-05-31 LAB
ANION GAP SERPL CALCULATED.3IONS-SCNC: 11 MMOL/L (ref 4–13)
BASOPHILS # BLD AUTO: 0.03 THOUSANDS/ÂΜL (ref 0–0.1)
BASOPHILS NFR BLD AUTO: 0 % (ref 0–1)
BUN SERPL-MCNC: 11 MG/DL (ref 5–25)
CALCIUM SERPL-MCNC: 7.7 MG/DL (ref 8.4–10.2)
CHLORIDE SERPL-SCNC: 106 MMOL/L (ref 96–108)
CO2 SERPL-SCNC: 21 MMOL/L (ref 21–32)
CREAT SERPL-MCNC: 0.62 MG/DL (ref 0.6–1.3)
EOSINOPHIL # BLD AUTO: 0.01 THOUSAND/ÂΜL (ref 0–0.61)
EOSINOPHIL NFR BLD AUTO: 0 % (ref 0–6)
ERYTHROCYTE [DISTWIDTH] IN BLOOD BY AUTOMATED COUNT: 14 % (ref 11.6–15.1)
GFR SERPL CREATININE-BSD FRML MDRD: 105 ML/MIN/1.73SQ M
GLUCOSE SERPL-MCNC: 117 MG/DL (ref 65–140)
HCT VFR BLD AUTO: 41.6 % (ref 34.8–46.1)
HGB BLD-MCNC: 13.1 G/DL (ref 11.5–15.4)
IMM GRANULOCYTES # BLD AUTO: 0.04 THOUSAND/UL (ref 0–0.2)
IMM GRANULOCYTES NFR BLD AUTO: 0 % (ref 0–2)
LYMPHOCYTES # BLD AUTO: 1.55 THOUSANDS/ÂΜL (ref 0.6–4.47)
LYMPHOCYTES NFR BLD AUTO: 16 % (ref 14–44)
MAGNESIUM SERPL-MCNC: 2.7 MG/DL (ref 1.9–2.7)
MCH RBC QN AUTO: 30.1 PG (ref 26.8–34.3)
MCHC RBC AUTO-ENTMCNC: 31.5 G/DL (ref 31.4–37.4)
MCV RBC AUTO: 96 FL (ref 82–98)
MONOCYTES # BLD AUTO: 0.85 THOUSAND/ÂΜL (ref 0.17–1.22)
MONOCYTES NFR BLD AUTO: 9 % (ref 4–12)
NEUTROPHILS # BLD AUTO: 7.38 THOUSANDS/ÂΜL (ref 1.85–7.62)
NEUTS SEG NFR BLD AUTO: 75 % (ref 43–75)
NRBC BLD AUTO-RTO: 0 /100 WBCS
PHOSPHATE SERPL-MCNC: 2.1 MG/DL (ref 2.7–4.5)
PLATELET # BLD AUTO: 386 THOUSANDS/UL (ref 149–390)
PMV BLD AUTO: 9.5 FL (ref 8.9–12.7)
POTASSIUM SERPL-SCNC: 4 MMOL/L (ref 3.5–5.3)
RBC # BLD AUTO: 4.35 MILLION/UL (ref 3.81–5.12)
SODIUM SERPL-SCNC: 138 MMOL/L (ref 135–147)
WBC # BLD AUTO: 9.86 THOUSAND/UL (ref 4.31–10.16)

## 2023-05-31 PROCEDURE — C9113 INJ PANTOPRAZOLE SODIUM, VIA: HCPCS | Performed by: NURSE PRACTITIONER

## 2023-05-31 PROCEDURE — 99232 SBSQ HOSP IP/OBS MODERATE 35: CPT | Performed by: PHYSICIAN ASSISTANT

## 2023-05-31 PROCEDURE — 80048 BASIC METABOLIC PNL TOTAL CA: CPT | Performed by: INTERNAL MEDICINE

## 2023-05-31 PROCEDURE — 95816 EEG AWAKE AND DROWSY: CPT

## 2023-05-31 PROCEDURE — 99232 SBSQ HOSP IP/OBS MODERATE 35: CPT | Performed by: INTERNAL MEDICINE

## 2023-05-31 PROCEDURE — 83735 ASSAY OF MAGNESIUM: CPT | Performed by: INTERNAL MEDICINE

## 2023-05-31 PROCEDURE — 95812 EEG 41-60 MINUTES: CPT | Performed by: STUDENT IN AN ORGANIZED HEALTH CARE EDUCATION/TRAINING PROGRAM

## 2023-05-31 PROCEDURE — 85025 COMPLETE CBC W/AUTO DIFF WBC: CPT | Performed by: INTERNAL MEDICINE

## 2023-05-31 PROCEDURE — 84100 ASSAY OF PHOSPHORUS: CPT | Performed by: INTERNAL MEDICINE

## 2023-05-31 RX ORDER — DIPHENHYDRAMINE HYDROCHLORIDE 50 MG/ML
50 INJECTION INTRAMUSCULAR; INTRAVENOUS EVERY 6 HOURS PRN
Status: DISCONTINUED | OUTPATIENT
Start: 2023-05-31 | End: 2023-06-13 | Stop reason: HOSPADM

## 2023-05-31 RX ORDER — HYDROMORPHONE HCL/PF 1 MG/ML
1 SYRINGE (ML) INJECTION EVERY 4 HOURS PRN
Status: DISCONTINUED | OUTPATIENT
Start: 2023-05-31 | End: 2023-06-12

## 2023-05-31 RX ORDER — PANTOPRAZOLE SODIUM 40 MG/10ML
40 INJECTION, POWDER, LYOPHILIZED, FOR SOLUTION INTRAVENOUS
Status: DISCONTINUED | OUTPATIENT
Start: 2023-06-01 | End: 2023-06-09

## 2023-05-31 RX ORDER — PANTOPRAZOLE SODIUM 40 MG/10ML
40 INJECTION, POWDER, LYOPHILIZED, FOR SOLUTION INTRAVENOUS
Status: DISCONTINUED | OUTPATIENT
Start: 2023-05-31 | End: 2023-05-31

## 2023-05-31 RX ORDER — FAMOTIDINE 10 MG/ML
20 INJECTION, SOLUTION INTRAVENOUS EVERY 12 HOURS SCHEDULED
Status: DISCONTINUED | OUTPATIENT
Start: 2023-05-31 | End: 2023-06-09

## 2023-05-31 RX ADMIN — ONDANSETRON 4 MG: 2 INJECTION INTRAMUSCULAR; INTRAVENOUS at 20:01

## 2023-05-31 RX ADMIN — METHYLPREDNISOLONE SODIUM SUCCINATE 20 MG: 40 INJECTION, POWDER, FOR SOLUTION INTRAMUSCULAR; INTRAVENOUS at 13:01

## 2023-05-31 RX ADMIN — LORAZEPAM 1 MG: 2 INJECTION INTRAMUSCULAR; INTRAVENOUS at 18:28

## 2023-05-31 RX ADMIN — SODIUM CHLORIDE 100 ML/HR: 0.9 INJECTION, SOLUTION INTRAVENOUS at 15:00

## 2023-05-31 RX ADMIN — HYDROMORPHONE HYDROCHLORIDE 1 MG: 1 INJECTION, SOLUTION INTRAMUSCULAR; INTRAVENOUS; SUBCUTANEOUS at 13:10

## 2023-05-31 RX ADMIN — LORAZEPAM 1 MG: 2 INJECTION INTRAMUSCULAR; INTRAVENOUS at 05:09

## 2023-05-31 RX ADMIN — HYDROMORPHONE HYDROCHLORIDE 0.5 MG: 1 INJECTION, SOLUTION INTRAMUSCULAR; INTRAVENOUS; SUBCUTANEOUS at 00:32

## 2023-05-31 RX ADMIN — ONDANSETRON 4 MG: 2 INJECTION INTRAMUSCULAR; INTRAVENOUS at 01:04

## 2023-05-31 RX ADMIN — DIPHENHYDRAMINE HYDROCHLORIDE 50 MG: 50 INJECTION, SOLUTION INTRAMUSCULAR; INTRAVENOUS at 10:12

## 2023-05-31 RX ADMIN — FAMOTIDINE 20 MG: 10 INJECTION, SOLUTION INTRAVENOUS at 21:58

## 2023-05-31 RX ADMIN — LORAZEPAM 1 MG: 2 INJECTION INTRAMUSCULAR; INTRAVENOUS at 12:05

## 2023-05-31 RX ADMIN — HYDROMORPHONE HYDROCHLORIDE 0.5 MG: 1 INJECTION, SOLUTION INTRAMUSCULAR; INTRAVENOUS; SUBCUTANEOUS at 05:46

## 2023-05-31 RX ADMIN — FAMOTIDINE 20 MG: 10 INJECTION, SOLUTION INTRAVENOUS at 08:52

## 2023-05-31 RX ADMIN — ONDANSETRON 4 MG: 2 INJECTION INTRAMUSCULAR; INTRAVENOUS at 08:52

## 2023-05-31 RX ADMIN — DIPHENHYDRAMINE HYDROCHLORIDE 50 MG: 50 INJECTION, SOLUTION INTRAMUSCULAR; INTRAVENOUS at 05:12

## 2023-05-31 RX ADMIN — HYDROMORPHONE HYDROCHLORIDE 1 MG: 1 INJECTION, SOLUTION INTRAMUSCULAR; INTRAVENOUS; SUBCUTANEOUS at 20:03

## 2023-05-31 RX ADMIN — METHYLPREDNISOLONE SODIUM SUCCINATE 20 MG: 40 INJECTION, POWDER, FOR SOLUTION INTRAMUSCULAR; INTRAVENOUS at 21:58

## 2023-05-31 RX ADMIN — SODIUM CHLORIDE 100 ML/HR: 0.9 INJECTION, SOLUTION INTRAVENOUS at 04:45

## 2023-05-31 RX ADMIN — DIPHENHYDRAMINE HYDROCHLORIDE 50 MG: 50 INJECTION, SOLUTION INTRAMUSCULAR; INTRAVENOUS at 12:39

## 2023-05-31 RX ADMIN — PANTOPRAZOLE SODIUM 40 MG: 40 INJECTION, POWDER, FOR SOLUTION INTRAVENOUS at 08:52

## 2023-05-31 RX ADMIN — METHYLPREDNISOLONE SODIUM SUCCINATE 20 MG: 40 INJECTION, POWDER, FOR SOLUTION INTRAMUSCULAR; INTRAVENOUS at 05:10

## 2023-05-31 RX ADMIN — DIPHENHYDRAMINE HYDROCHLORIDE 50 MG: 50 INJECTION, SOLUTION INTRAMUSCULAR; INTRAVENOUS at 18:21

## 2023-05-31 NOTE — PROGRESS NOTES
"Progress Note- Rahul Ortega 46 y o  female MRN: [id number]    Unit/Bed#: 901-36 Encounter: [id number]      Assessment and Plan:    The patient continues to report frustration with inability to eat or function with the pain, nausea, and diarrhea  I discussed with the patient that at this point with regards to her upcoming cystoscopy or any possible transfer, this would have to be something she would have to discuss with Dr Torin Branhc and the hospitalist team to see if that is even a possibility  Consider additional evaluation of dysuria by the primary care team      We will also defer her pain medication to the hospitalist team as well  The patient verbalized an understanding  1  Vomiting & Diarrhea  2  Chron's disease of colon with complication  3  Immunosuppressed state due to chronic steroid use  4  Mast Cell Activation Syndrome   We discussed with the patient that at this point we would like to give the change in the methylprednisone at least another day or two and hopefully she will start to see some improvement of the current IBD flare/colitis as well as her MAST cell syndrome  The patient was agreeable and verbalized an understanding   -Continue IV fluids  -Continue medications as ordered  -Continue supportive care    GI will continue to follow  ______________________________________________________________________    Subjective:     Patient is a 46 y o  female who is currently being treated for her IBD (Crohns vs possible colitis)  She continues to report worsening nausea, abdominal pain, and continued diarrhea  She reports that she has not been able to start eating her breakfast as she had not yet had her IV protonix, famotidine, and zofran, but received it while I was in with the patient at the bedside this morning   The patient reports that she did not eat much yesterday except for the cereal she had at breakfast and was \"running back and forth,\" to the bathroom all night with diarrhea and " abdominal pain, but continues to deny any blood in her stool  The patient also reports she is having irritation and excoriation of her rectum because of all the bowel movements and wiping  She also reports that she is noticing pain with urination and denies any blood upon urination and does report her urine is lighter in color than it was prior to coming to the hospital, without any significant smell, but the patient reports she is fearful to try to push to get her urine out as she is very fearful that it would cause a spontaneous bowel rupture as her mother has had one in the past   The patient reports that she has been having urinary issues and at this point is still scheduled for a cystoscopy in the hospital in [place] with Dr Meghna Galarza  The patient was hoping that maybe she could speak with Dr Meghna Galarza and possibly get transferred down to [place] so she can then have her cystoscopy and then also is concerned about staying for at least 48 hours after her procedure inpatient to manage her hydration  The patient reports that so far she has not seen any change or improvement in her pain since we changed her methylprednisone to 20 mg IV 3 times daily and continues with worsening abdominal pain and feels that her current dosage of Dilaudid is not enough and was wondering if there is anything we can do regarding the Dilaudid dosage and her pain  I did let the patient know that I have discussed her current situation with Dr Derian العلي to keep him updated regarding her status and care and he did not have anything to add or change at this time       Medication Administration - last 24 hours from 05/30/2023 0938 to 05/31/2023 1254       Date/Time Order Dose Route Action Action by     05/31/2023 2403 EDT cholecalciferol (VITAMIN D) oral liquid 1,000 Units 1,000 Units Oral Not Given Kalpesh Cui RN     05/30/2023 2102 EDT cholecalciferol (VITAMIN D) oral liquid 1,000 Units 1,000 Units Oral Not Given Babita Zavala RN     05/30/2023 1000 EDT cholecalciferol (VITAMIN D) oral liquid 1,000 Units 1,000 Units Oral Not Given Shar Bucio, RN     05/31/2023 4892 EDT levothyroxine tablet 100 mcg 100 mcg Oral Not Given Leta Simmons, RN     05/31/2023 0445 EDT sodium chloride 0 9 % infusion 100 mL/hr Intravenous [facility] [place] [facility] [address] Blew, RN     05/30/2023 1826 EDT sodium chloride 0 9 % infusion 100 mL/hr Intravenous [place], [facility]     05/31/2023 9669 EDT enoxaparin (LOVENOX) subcutaneous injection 40 mg 40 mg Subcutaneous Not Given Lisa Iverson, ROLLY     05/31/2023 1203 EDT budesonide (ENTOCORT EC) capsule 3 mg 3 mg Oral Not Given Lisa Iverson, ROLLY     05/30/2023 2102 EDT budesonide (ENTOCORT EC) capsule 3 mg 3 mg Oral Not Given Babita Zavala, RN     05/30/2023 1520 EDT budesonide (ENTOCORT EC) capsule 3 mg 3 mg Oral Not Given Shar Bucio, RN     05/30/2023 9599 EDT budesonide (ENTOCORT EC) capsule 3 mg 3 mg Oral Not Given Shar Bucio, RN     05/31/2023 5019 EDT Famotidine (PF) (PEPCID) injection 20 mg 20 mg Intravenous Given Lisa Iverson, ROLLY     05/30/2023 2059 EDT Famotidine (PF) (PEPCID) injection 20 mg 20 mg Intravenous Given Babita Zavala, RN     05/31/2023 7590 EDT ondansetron (ZOFRAN) injection 4 mg 4 mg Intravenous Given Lisa Iverson, RN     05/31/2023 0104 EDT ondansetron (ZOFRAN) injection 4 mg 4 mg Intravenous Given Babita Zavala, RN     05/30/2023 1826 EDT ondansetron (ZOFRAN) injection 4 mg 4 mg Intravenous Given Shar Bucio, RN     05/30/2023 1121 EDT ondansetron (ZOFRAN) injection 4 mg 4 mg Intravenous Given Shar Bucio, RN     05/31/2023 0295 EDT diphenhydrAMINE (BENADRYL) injection 50 mg 50 mg Intramuscular Given Babita Blew, RN     05/30/2023 2352 EDT diphenhydrAMINE (BENADRYL) injection 50 mg 50 mg Intramuscular Given Babita Blew, RN     05/30/2023 1742 EDT diphenhydrAMINE (BENADRYL) injection 50 mg 50 mg Intramuscular Given Shar Bucio, RN     05/30/2023 1121 EDT diphenhydrAMINE (BENADRYL) injection 50 mg 50 mg Intramuscular Given Keren Feliz, RN     05/31/2023 0102 EDT HYDROmorphone (DILAUDID) injection 0 5 mg 0 5 mg Intravenous Given Babita Blew, RN     05/31/2023 0032 EDT HYDROmorphone (DILAUDID) injection 0 5 mg 0 5 mg Intravenous Given Babita Blew, RN     05/30/2023 2355 EDT HYDROmorphone (DILAUDID) injection 0 5 mg 0 mg Intravenous Return to [facility], RN     05/30/2023 1743 EDT HYDROmorphone (DILAUDID) injection 0 5 mg 0 5 mg Intravenous Given Keren Feliz, RN     05/30/2023 1121 EDT HYDROmorphone (DILAUDID) injection 0 5 mg 0 5 mg Intravenous Given Keren Feliz, RN     05/31/2023 4723 EDT LORazepam (ATIVAN) injection 1 mg 1 mg Intravenous Given Babita Blew, RN     05/30/2023 2351 EDT LORazepam (ATIVAN) injection 1 mg 1 mg Intravenous Given Babita Blew, RN     05/30/2023 1827 EDT LORazepam (ATIVAN) injection 1 mg 1 mg Intravenous Given Keren Feliz, RN     05/30/2023 1144 EDT LORazepam (ATIVAN) injection 1 mg 1 mg Intravenous Given Keren Feliz, RN     05/30/2023 1134 EDT magnesium sulfate 2 g/50 mL IVPB (premix) 2 g 0 g Intravenous Stopped Keren Feliz, RN     05/31/2023 8244 EDT potassium-sodium phosphateS (K-PHOS,PHOSPHA 250) -586 mg tablet 1 tablet 1 tablet Oral Not Given Elo Anderson, RN     05/30/2023 1608 EDT potassium-sodium phosphateS (K-PHOS,PHOSPHA 250) -250 mg tablet 1 tablet 1 tablet Oral Not Given Keren Feliz, RN     05/30/2023 1122 EDT potassium-sodium phosphateS (K-PHOS,PHOSPHA 250) -250 mg tablet 1 tablet 1 tablet Oral Not Given Keren Feliz, RN     05/31/2023 0510 EDT methylPREDNISolone sodium succinate (Solu-MEDROL) injection 20 mg 20 mg Intravenous Given Babita Blew, RN     05/30/2023 2101 EDT methylPREDNISolone sodium succinate (Solu-MEDROL) injection 20 mg 20 mg Intravenous Given Babita Zavala RN     05/31/2023 6416 EDT pantoprazole (PROTONIX) injection 40 mg 40 mg Intravenous Given Elo Anderson RN          Objective:     Vitals: Blood pressure 118/80, pulse 96, temperature "98 1 °F (36 7 °C), resp  rate 18, height 5' 5\" (1 651 m), weight 66 kg (145 lb 8 1 oz), SpO2 95 %, not currently breastfeeding  ,Body mass index is 24 21 kg/m²  Intake/Output Summary (Last 24 hours) at 5/31/2023 5921  Last data filed at 5/31/2023 0601  Gross per 24 hour   Intake 2743 34 ml   Output --   Net 2743 34 ml       Physical Exam:   General Appearance: Awake and alert, in no acute distress  Abdomen: Soft, non-tender, non-distended; bowel sounds normal; no masses or no organomegaly    Invasive Devices     Peripheral Intravenous Line  Duration           Peripheral IV 05/29/23 Ventral (anterior); Left Forearm 1 day                Lab Results:  Admission on 05/29/2023   Component Date Value   • WBC 05/29/2023 13 25 (H)    • RBC 05/29/2023 4 91    • Hemoglobin 05/29/2023 15 1    • Hematocrit 05/29/2023 44 8    • MCV 05/29/2023 91    • MCH 05/29/2023 30 8    • MCHC 05/29/2023 33 7    • RDW 05/29/2023 13 7    • MPV 05/29/2023 8 6 (L)    • Platelets 21/14/2631 424 (H)    • nRBC 05/29/2023 0    • Neutrophils Relative 05/29/2023 92 (H)    • Immat GRANS % 05/29/2023 1    • Lymphocytes Relative 05/29/2023 6 (L)    • Monocytes Relative 05/29/2023 1 (L)    • Eosinophils Relative 05/29/2023 0    • Basophils Relative 05/29/2023 0    • Neutrophils Absolute 05/29/2023 12 33 (H)    • Immature Grans Absolute 05/29/2023 0 07    • Lymphocytes Absolute 05/29/2023 0 73    • Monocytes Absolute 05/29/2023 0 07 (L)    • Eosinophils Absolute 05/29/2023 0 01    • Basophils Absolute 05/29/2023 0 04    • Sodium 05/29/2023 133 (L)    • Potassium 05/29/2023 3 6    • Chloride 05/29/2023 100    • CO2 05/29/2023 20 (L)    • ANION GAP 05/29/2023 13    • BUN 05/29/2023 9    • Creatinine 05/29/2023 0 85    • Glucose 05/29/2023 207 (H)    • Calcium 05/29/2023 9 2    • AST 05/29/2023 12 (L)    • ALT 05/29/2023 10    • Alkaline Phosphatase 05/29/2023 61    • Total Protein 05/29/2023 6 5    • Albumin 05/29/2023 3 6    • Total Bilirubin 05/29/2023 " 0 57    • eGFR 05/29/2023 80    • Lipase 05/29/2023 <6 (L)    • TSH 3RD GENERATON 05/29/2023 1 889    • C difficile toxin by PCR 05/29/2023 Negative    • Sodium 05/29/2023 137    • Potassium 05/29/2023 4 5    • Chloride 05/29/2023 104    • CO2 05/29/2023 22    • ANION GAP 05/29/2023 11    • BUN 05/29/2023 7    • Creatinine 05/29/2023 0 78    • Glucose 05/29/2023 186 (H)    • Glucose, Fasting 05/29/2023 186 (H)    • Calcium 05/29/2023 8 9    • eGFR 05/29/2023 88    • Salmonella sp PCR 05/29/2023 None Detected    • Shigella sp/Enteroinvasi* 05/29/2023 None Detected    • Campylobacter sp (jejuni* 05/29/2023 None Detected    • Shiga toxin 1/Shiga toxi* 05/29/2023 None Detected    • WBC 05/30/2023 13 84 (H)    • RBC 05/30/2023 4 30    • Hemoglobin 05/30/2023 12 7    • Hematocrit 05/30/2023 39 9    • MCV 05/30/2023 93    • MCH 05/30/2023 29 5    • MCHC 05/30/2023 31 8    • RDW 05/30/2023 14 1    • MPV 05/30/2023 9 0    • Platelets 44/42/2915 400 (H)    • nRBC 05/30/2023 0    • Neutrophils Relative 05/30/2023 70    • Immat GRANS % 05/30/2023 0    • Lymphocytes Relative 05/30/2023 16    • Monocytes Relative 05/30/2023 12    • Eosinophils Relative 05/30/2023 1    • Basophils Relative 05/30/2023 1    • Neutrophils Absolute 05/30/2023 9 78 (H)    • Immature Grans Absolute 05/30/2023 0 04    • Lymphocytes Absolute 05/30/2023 2 22    • Monocytes Absolute 05/30/2023 1 64 (H)    • Eosinophils Absolute 05/30/2023 0 09    • Basophils Absolute 05/30/2023 0 07    • Sodium 05/30/2023 140    • Potassium 05/30/2023 3 6    • Chloride 05/30/2023 108    • CO2 05/30/2023 25    • ANION GAP 05/30/2023 7    • BUN 05/30/2023 11    • Creatinine 05/30/2023 0 90    • Glucose 05/30/2023 88    • Calcium 05/30/2023 8 2 (L)    • Corrected Calcium 05/30/2023 8 7    • AST 05/30/2023 8 (L)    • ALT 05/30/2023 9    • Alkaline Phosphatase 05/30/2023 49    • Total Protein 05/30/2023 5 9 (L)    • Albumin 05/30/2023 3 4 (L)    • Total Bilirubin 05/30/2023 0 45 • eGFR 05/30/2023 74    • Magnesium 05/30/2023 1 8 (L)    • Procalcitonin 05/30/2023 <0 05    • Phosphorus 05/30/2023 1 3 (L)    • WBC 05/31/2023 9 86    • RBC 05/31/2023 4 35    • Hemoglobin 05/31/2023 13 1    • Hematocrit 05/31/2023 41 6    • MCV 05/31/2023 96    • MCH 05/31/2023 30 1    • MCHC 05/31/2023 31 5    • RDW 05/31/2023 14 0    • MPV 05/31/2023 9 5    • Platelets 73/46/0558 386    • nRBC 05/31/2023 0    • Neutrophils Relative 05/31/2023 75    • Immat GRANS % 05/31/2023 0    • Lymphocytes Relative 05/31/2023 16    • Monocytes Relative 05/31/2023 9    • Eosinophils Relative 05/31/2023 0    • Basophils Relative 05/31/2023 0    • Neutrophils Absolute 05/31/2023 7 38    • Immature Grans Absolute 05/31/2023 0 04    • Lymphocytes Absolute 05/31/2023 1 55    • Monocytes Absolute 05/31/2023 0 85    • Eosinophils Absolute 05/31/2023 0 01    • Basophils Absolute 05/31/2023 0 03    • Sodium 05/31/2023 138    • Potassium 05/31/2023 4 0    • Chloride 05/31/2023 106    • CO2 05/31/2023 21    • ANION GAP 05/31/2023 11    • BUN 05/31/2023 11    • Creatinine 05/31/2023 0 62    • Glucose 05/31/2023 117    • Calcium 05/31/2023 7 7 (L)    • eGFR 05/31/2023 105    • Magnesium 05/31/2023 2 7    • Phosphorus 05/31/2023 2 1 (L)        Imaging Studies: I have personally reviewed pertinent imaging studies

## 2023-05-31 NOTE — PROGRESS NOTES
[address]  Progress Note  Name: Nate Arias  MRN: [id number]  Unit/Bed#: 495-11 I Date of Admission: 5/29/2023   Date of Service: 5/31/2023 I Hospital Day: 2    Assessment/Plan   * Decreased oral intake  Assessment & Plan  Decreased p o  intake for the past week due to persistent vomiting and diarrhea  · Symptomatic management vomiting and diarrhea as below to help moderate oral intake  · Start with surgical soft diet, advanced to regular diet  · Encourage p o  intake is much as possible  · Appears to have a 6 pound weight loss over the past month when weight is trended    Crohn's disease of colon with complication (HCC)  Assessment & Plan  · Stable, no bloody diarrhea  · Continue home budesonide 3 mg 3 times daily [nonformulary, patient does have medication with her] and prednisone 20 mg daily as needed  · On Stelara and omalizumab infusion treatment with IV hydration-next scheduled on 5/30/2023  · Patient missed all infusions this past week  · Continue Solu-Medrol --> switched to 20 mg q8hr, anticipate 1-2 days of IV steroids and then switch to oral regimen per GI recommendations      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[facility] [address] )  Assessment & Plan  · Follows with mast cell activation specialist at the [facility]  · Continue Benadryl 50 mg twice daily, Protonix 40 mg twice daily, pepcid twice daily  · Watch for any signs of reaction    Seizure ([facility] [address] )  Assessment & Plan  · Ativan as needed  · Not on any other seizure medications at home  · EEG completed today, awaiting final read    Hypothyroidism (acquired)  Assessment & Plan  · TSH WNL 1 889  · Continue levothyroxine 100 mcg daily    Vomiting and diarrhea  Assessment & Plan  Ongoing nonbloody nonbilious emesis and nonbloody diarrhea since EGD colonoscopy on 5/2/2023  · Reports this has been worsening over the past week  · Vomiting improved with IV Zofran-continue p o  as needed  · C  difficile ordered in ED per patient request though low to no suspicion as no prolonged hospitalization recently, no recent antibiotic use, no prior history of infection  · Received 1 5 L normal saline bolus in ED, continue normal saline 100 cc/h for maintenance  · Leukocytosis likely stress response vs viral gastroenteritis as symptoms worsening over the past week after resolution  · GI consulted as per patient GI symptoms have been ongoing since scoping on 5/2/2023  · Stool C  difficile and culture are negative  · Replace electrolytes as needed  · Vomiting has stopped but diarrhea persists, diet as tolerated    Hyponatremia-resolved as of 5/31/2023  Assessment & Plan  Sodium 133 on arrival  · Likely hypovolemic hyponatremia in setting of vomiting and diarrhea  · Received normal saline 1 5 L bolus in ED  · Continue IV normal saline 100 cc/h for rehydration  · Resolved with fluids             VTE Pharmacologic Prophylaxis: VTE Score: 3 Moderate Risk (Score 3-4) - Pharmacological DVT Prophylaxis Ordered: enoxaparin (Lovenox)  Patient Centered Rounds: I performed bedside rounds with nursing staff today  Discussions with Specialists or Other Care Team Provider: case management, GI    Education and Discussions with Family / Patient: Patient declined call to   Total Time Spent on Date of Encounter in care of patient: 45 minutes This time was spent on one or more of the following: performing physical exam; counseling and coordination of care; obtaining or reviewing history; documenting in the medical record; reviewing/ordering tests, medications or procedures; communicating with other healthcare professionals and discussing with patient's family/caregivers  Current Length of Stay: 2 day(s)  Current Patient Status: Inpatient   Certification Statement: The patient will continue to require additional inpatient hospital stay due to continued management of crohns flare  Discharge Plan: Anticipate discharge in 48-72 hrs to home      Code Status: Level 1 - Full "Code    Subjective:   Patient reports improving vomiting but continued diarrhea  She has been attempting to eat but states it still \"goes through her\"    Objective:     Vitals:   Temp (24hrs), Av 1 °F (36 7 °C), Min:98 1 °F (36 7 °C), Max:98 1 °F (36 7 °C)    Temp:  [98 1 °F (36 7 °C)] 98 1 °F (36 7 °C)  HR:  [82-99] 96  Resp:  [18] 18  BP: (114-118)/(75-80) 118/80  SpO2:  [95 %-96 %] 95 %  Body mass index is 24 21 kg/m²  Input and Output Summary (last 24 hours): Intake/Output Summary (Last 24 hours) at 2023 1510  Last data filed at 2023 1500  Gross per 24 hour   Intake 3768 34 ml   Output --   Net 3768 34 ml       Physical Exam:   Physical Exam  Vitals and nursing note reviewed  Constitutional:       General: She is not in acute distress  Appearance: She is ill-appearing  HENT:      Head: Normocephalic and atraumatic  Cardiovascular:      Rate and Rhythm: Normal rate and regular rhythm  Pulses: Normal pulses  Heart sounds: Normal heart sounds  No murmur heard  No gallop  Pulmonary:      Effort: Pulmonary effort is normal       Breath sounds: Normal breath sounds  No wheezing, rhonchi or rales  Abdominal:      General: Bowel sounds are normal       Tenderness: There is abdominal tenderness  There is no guarding or rebound  Musculoskeletal:         General: Normal range of motion  Cervical back: Normal range of motion and neck supple  Right lower leg: No edema  Left lower leg: No edema  Skin:     General: Skin is warm and dry  Neurological:      General: No focal deficit present  Mental Status: She is alert  Mental status is at baseline     Psychiatric:         Mood and Affect: Mood normal          Behavior: Behavior normal           Additional Data:     Labs:  Results from last 7 days   Lab Units 23  0457   EOS PCT % 0   HEMATOCRIT % 41 6   HEMOGLOBIN g/dL 13 1   LYMPHS PCT % 16   MONOS PCT % 9   NEUTROS PCT % 75   PLATELETS " Thousands/uL 386   WBC Thousand/uL 9 86     Results from last 7 days   Lab Units 05/31/23  0457 05/30/23  0504   ANION GAP mmol/L 11 7   ALBUMIN g/dL  --  3 4*   ALK PHOS U/L  --  49   ALT U/L  --  9   AST U/L  --  8*   BUN mg/dL 11 11   CALCIUM mg/dL 7 7* 8 2*   CHLORIDE mmol/L 106 108   CO2 mmol/L 21 25   CREATININE mg/dL 0 62 0 90   GLUCOSE RANDOM mg/dL 117 88   POTASSIUM mmol/L 4 0 3 6   SODIUM mmol/L 138 140   TOTAL BILIRUBIN mg/dL  --  0 45                 Results from last 7 days   Lab Units 05/30/23  0504   PROCALCITONIN ng/ml <0 05       Lines/Drains:  Invasive Devices     Peripheral Intravenous Line  Duration           Peripheral IV 05/29/23 Ventral (anterior); Left Forearm 1 day                      Imaging: No pertinent imaging reviewed      Recent Cultures (last 7 days):   Results from last 7 days   Lab Units 05/29/23  2234   C DIFF TOXIN B BY PCR  Negative       Last 24 Hours Medication List:   Current Facility-Administered Medications   Medication Dose Route Frequency Provider Last Rate   • acetaminophen  650 mg Oral Q6H PRN Joanne Shadow, DO     • albuterol  2 puff Inhalation Q4H PRN Betzy Grigsby DO     • budesonide  3 mg Oral TID Joanne Shadow, DO     • butalbital-acetaminophen-caffeine  1 tablet Oral Q8H PRN Joanne Shadow, DO     • cholecalciferol  1,000 Units Oral BID Joanne Shadow, DO     • diphenhydrAMINE  50 mg Intramuscular Q6H Baldemar Quiles MD     • diphenhydrAMINE  50 mg Intravenous Q6H PRN Bernida Aschoff, PA-C     • enoxaparin  40 mg Subcutaneous Daily Betzy Grigsby DO     • famotidine  20 mg Intravenous Q12H [address] [facility], RANDEE     • HYDROmorphone  1 mg Intravenous Q4H PRN Bernida Aschoff, PA-C     • levothyroxine  100 mcg Oral Early Morning Betzy Grigsby DO     • LORazepam  1 mg Intravenous Q4H PRN Flor Tripp DO     • methylPREDNISolone sodium succinate  20 mg Intravenous [facility] JONATHAN Oliveira     • ondansetron  4 mg Intravenous Q6H PRN Baldemar Quiles MD     • [START ON 6/1/2023] pantoprazole  40 mg Intravenous Q24H [address] Mirlande Anderson PA-C     • potassium-sodium phosphateS  1 tablet Oral TID With Meals Mason Coon MD     • predniSONE  20 mg Oral Daily PRN Rubye Click, DO     • sodium chloride  100 mL/hr Intravenous Continuous Betzy Grigsby,  mL/hr (05/31/23 1500)        Today, Patient Was Seen By: Mirlande Anderson PA-C    **Please Note: This note may have been constructed using a voice recognition system  **

## 2023-05-31 NOTE — PLAN OF CARE
Problem: MOBILITY - ADULT  Goal: Maintain or return to baseline ADL function  Description: INTERVENTIONS:  -  Assess patient's ability to carry out ADLs; assess patient's baseline for ADL function and identify physical deficits which impact ability to perform ADLs (bathing, care of mouth/teeth, toileting, grooming, dressing, etc )  - Assess/evaluate cause of self-care deficits   - Assess range of motion  - Assess patient's mobility; develop plan if impaired  - Assess patient's need for assistive devices and provide as appropriate  - Encourage maximum independence but intervene and supervise when necessary  - Involve family in performance of ADLs  - Assess for home care needs following discharge   - Consider OT consult to assist with ADL evaluation and planning for discharge  - Provide patient education as appropriate  Outcome: Progressing  Goal: Maintains/Returns to pre admission functional level  Description: INTERVENTIONS:  - Perform BMAT or MOVE assessment daily    - Set and communicate daily mobility goal to care team and patient/family/caregiver  - Collaborate with rehabilitation services on mobility goals if consulted  - Perform Range of Motion 3-4 times a day  - Reposition patient every 2 hours    - Dangle patient 3 times a day  - Stand patient 3 times a day  - Ambulate patient 3 times a day  - Out of bed to chair 3 times a day   - Out of bed for meals 3 times a day  - Out of bed for toileting  - Record patient progress and toleration of activity level   Outcome: Progressing     Problem: GASTROINTESTINAL - ADULT  Goal: Minimal or absence of nausea and/or vomiting  Description: INTERVENTIONS:  - Administer IV fluids if ordered to ensure adequate hydration  - Maintain NPO status until nausea and vomiting are resolved  - Nasogastric tube if ordered  - Administer ordered antiemetic medications as needed  - Provide nonpharmacologic comfort measures as appropriate  - Advance diet as tolerated, if ordered  - Consider nutrition services referral to assist patient with adequate nutrition and appropriate food choices  Outcome: Progressing  Goal: Maintains or returns to baseline bowel function  Description: INTERVENTIONS:  - Assess bowel function  - Encourage oral fluids to ensure adequate hydration  - Administer IV fluids if ordered to ensure adequate hydration  - Administer ordered medications as needed  - Encourage mobilization and activity  - Consider nutritional services referral to assist patient with adequate nutrition and appropriate food choices  Outcome: Progressing  Goal: Maintains adequate nutritional intake  Description: INTERVENTIONS:  - Monitor percentage of each meal consumed  - Identify factors contributing to decreased intake, treat as appropriate  - Assist with meals as needed  - Monitor I&O, weight, and lab values if indicated  - Obtain nutrition services referral as needed  Outcome: Progressing     Problem: METABOLIC, FLUID AND ELECTROLYTES - ADULT  Goal: Electrolytes maintained within normal limits  Description: INTERVENTIONS:  - Monitor labs and assess patient for signs and symptoms of electrolyte imbalances  - Administer electrolyte replacement as ordered  - Monitor response to electrolyte replacements, including repeat lab results as appropriate  - Instruct patient on fluid and nutrition as appropriate  Outcome: Progressing  Goal: Fluid balance maintained  Description: INTERVENTIONS:  - Monitor labs   - Monitor I/O and WT  - Instruct patient on fluid and nutrition as appropriate  - Assess for signs & symptoms of volume excess or deficit  Outcome: Progressing     Problem: PAIN - ADULT  Goal: Verbalizes/displays adequate comfort level or baseline comfort level  Description: Interventions:  - Encourage patient to monitor pain and request assistance  - Assess pain using appropriate pain scale  - Administer analgesics based on type and severity of pain and evaluate response  - Implement non-pharmacological measures as appropriate and evaluate response  - Consider cultural and social influences on pain and pain management  - Notify physician/advanced practitioner if interventions unsuccessful or patient reports new pain  Outcome: Progressing     Problem: INFECTION - ADULT  Goal: Absence or prevention of progression during hospitalization  Description: INTERVENTIONS:  - Assess and monitor for signs and symptoms of infection  - Monitor lab/diagnostic results  - Monitor all insertion sites, i e  indwelling lines, tubes, and drains  - Monitor endotracheal if appropriate and nasal secretions for changes in amount and color  - [place] appropriate cooling/warming therapies per order  - Administer medications as ordered  - Instruct and encourage patient and family to use good hand hygiene technique  - Identify and instruct in appropriate isolation precautions for identified infection/condition  Outcome: Progressing  Goal: Absence of fever/infection during neutropenic period  Description: INTERVENTIONS:  - Monitor WBC    Outcome: Progressing     Problem: SAFETY ADULT  Goal: Maintain or return to baseline ADL function  Description: INTERVENTIONS:  -  Assess patient's ability to carry out ADLs; assess patient's baseline for ADL function and identify physical deficits which impact ability to perform ADLs (bathing, care of mouth/teeth, toileting, grooming, dressing, etc )  - Assess/evaluate cause of self-care deficits   - Assess range of motion  - Assess patient's mobility; develop plan if impaired  - Assess patient's need for assistive devices and provide as appropriate  - Encourage maximum independence but intervene and supervise when necessary  - Involve family in performance of ADLs  - Assess for home care needs following discharge   - Consider OT consult to assist with ADL evaluation and planning for discharge  - Provide patient education as appropriate  Outcome: Progressing  Goal: Maintains/Returns to pre admission functional level  Description: INTERVENTIONS:  - Perform BMAT or MOVE assessment daily    - Set and communicate daily mobility goal to care team and patient/family/caregiver  - Collaborate with rehabilitation services on mobility goals if consulted  - Perform Range of Motion 3-4 times a day  - Reposition patient every 2 hours    - Dangle patient 3 times a day  - Stand patient 3 times a day  - Ambulate patient 3 times a day  - Out of bed to chair 3 times a day   - Out of bed for meals 3 times a day  - Out of bed for toileting  - Record patient progress and toleration of activity level   Outcome: Progressing  Goal: Patient will remain free of falls  Description: INTERVENTIONS:  - Educate patient/family on patient safety including physical limitations  - Instruct patient to call for assistance with activity   - Consult OT/PT to assist with strengthening/mobility   - Keep Call bell within reach  - Keep bed low and locked with side rails adjusted as appropriate  - Keep care items and personal belongings within reach  - Initiate and maintain comfort rounds  - Make Fall Risk Sign visible to staff  - Offer Toileting every 2 Hours, in advance of need  - Initiate/Maintain bed/ chair alarm  - Apply yellow socks and bracelet for high fall risk patients  - Consider moving patient to room near nurses station  Outcome: Progressing     Problem: DISCHARGE PLANNING  Goal: Discharge to home or other facility with appropriate resources  Description: INTERVENTIONS:  - Identify barriers to discharge w/patient and caregiver  - Arrange for needed discharge resources and transportation as appropriate  - Identify discharge learning needs (meds, wound care, etc )  - Arrange for interpretive services to assist at discharge as needed  - Refer to Case Management Department for coordinating discharge planning if the patient needs post-hospital services based on physician/advanced practitioner order or complex needs related to functional status, cognitive ability, or social support system  Outcome: Progressing     Problem: Knowledge Deficit  Goal: Patient/family/caregiver demonstrates understanding of disease process, treatment plan, medications, and discharge instructions  Description: Complete learning assessment and assess knowledge base  Interventions:  - Provide teaching at level of understanding  - Provide teaching via preferred learning methods  Outcome: Progressing     Problem: Nutrition/Hydration-ADULT  Goal: Nutrient/Hydration intake appropriate for improving, restoring or maintaining nutritional needs  Description: Monitor and assess patient's nutrition/hydration status for malnutrition  Collaborate with interdisciplinary team and initiate plan and interventions as ordered  Monitor patient's weight and dietary intake as ordered or per policy  Utilize nutrition screening tool and intervene as necessary  Determine patient's food preferences and provide high-protein, high-caloric foods as appropriate       INTERVENTIONS:  - Monitor oral intake, urinary output, labs, and treatment plans  - Assess nutrition and hydration status and recommend course of action  - Evaluate amount of meals eaten  - Assist patient with eating if necessary   - Allow adequate time for meals  - Recommend/ encourage appropriate diets, oral nutritional supplements, and vitamin/mineral supplements  - Order, calculate, and assess calorie counts as needed  - Recommend, monitor, and adjust tube feedings and TPN/PPN based on assessed needs  - Assess need for intravenous fluids  - Provide specific nutrition/hydration education as appropriate  - Include patient/family/caregiver in decisions related to nutrition  Outcome: Progressing

## 2023-06-01 LAB
ANION GAP SERPL CALCULATED.3IONS-SCNC: 7 MMOL/L (ref 4–13)
BASOPHILS # BLD AUTO: 0.04 THOUSANDS/ÂΜL (ref 0–0.1)
BASOPHILS NFR BLD AUTO: 0 % (ref 0–1)
BILIRUB UR QL STRIP: NEGATIVE
BUN SERPL-MCNC: 10 MG/DL (ref 5–25)
CALCIUM SERPL-MCNC: 8 MG/DL (ref 8.4–10.2)
CHLORIDE SERPL-SCNC: 106 MMOL/L (ref 96–108)
CLARITY UR: CLEAR
CO2 SERPL-SCNC: 27 MMOL/L (ref 21–32)
COLOR UR: YELLOW
CREAT SERPL-MCNC: 0.54 MG/DL (ref 0.6–1.3)
EOSINOPHIL # BLD AUTO: 0 THOUSAND/ÂΜL (ref 0–0.61)
EOSINOPHIL NFR BLD AUTO: 0 % (ref 0–6)
ERYTHROCYTE [DISTWIDTH] IN BLOOD BY AUTOMATED COUNT: 13.9 % (ref 11.6–15.1)
GFR SERPL CREATININE-BSD FRML MDRD: 110 ML/MIN/1.73SQ M
GLUCOSE SERPL-MCNC: 128 MG/DL (ref 65–140)
GLUCOSE UR STRIP-MCNC: ABNORMAL MG/DL
HCT VFR BLD AUTO: 37.1 % (ref 34.8–46.1)
HGB BLD-MCNC: 12.3 G/DL (ref 11.5–15.4)
HGB UR QL STRIP.AUTO: NEGATIVE
IMM GRANULOCYTES # BLD AUTO: 0.15 THOUSAND/UL (ref 0–0.2)
IMM GRANULOCYTES NFR BLD AUTO: 1 % (ref 0–2)
KETONES UR STRIP-MCNC: NEGATIVE MG/DL
LEUKOCYTE ESTERASE UR QL STRIP: NEGATIVE
LYMPHOCYTES # BLD AUTO: 1.42 THOUSANDS/ÂΜL (ref 0.6–4.47)
LYMPHOCYTES NFR BLD AUTO: 12 % (ref 14–44)
MCH RBC QN AUTO: 30.4 PG (ref 26.8–34.3)
MCHC RBC AUTO-ENTMCNC: 33.2 G/DL (ref 31.4–37.4)
MCV RBC AUTO: 92 FL (ref 82–98)
MONOCYTES # BLD AUTO: 0.93 THOUSAND/ÂΜL (ref 0.17–1.22)
MONOCYTES NFR BLD AUTO: 8 % (ref 4–12)
NEUTROPHILS # BLD AUTO: 9.06 THOUSANDS/ÂΜL (ref 1.85–7.62)
NEUTS SEG NFR BLD AUTO: 79 % (ref 43–75)
NITRITE UR QL STRIP: NEGATIVE
NRBC BLD AUTO-RTO: 0 /100 WBCS
PH UR STRIP.AUTO: 6.5 [PH]
PLATELET # BLD AUTO: 431 THOUSANDS/UL (ref 149–390)
PMV BLD AUTO: 8.8 FL (ref 8.9–12.7)
POTASSIUM SERPL-SCNC: 3.5 MMOL/L (ref 3.5–5.3)
PROT UR STRIP-MCNC: NEGATIVE MG/DL
RBC # BLD AUTO: 4.04 MILLION/UL (ref 3.81–5.12)
SODIUM SERPL-SCNC: 140 MMOL/L (ref 135–147)
SP GR UR STRIP.AUTO: 1.01 (ref 1–1.03)
UROBILINOGEN UR QL STRIP.AUTO: 0.2 E.U./DL
WBC # BLD AUTO: 11.6 THOUSAND/UL (ref 4.31–10.16)

## 2023-06-01 PROCEDURE — 80048 BASIC METABOLIC PNL TOTAL CA: CPT | Performed by: PHYSICIAN ASSISTANT

## 2023-06-01 PROCEDURE — 81003 URINALYSIS AUTO W/O SCOPE: CPT | Performed by: PHYSICIAN ASSISTANT

## 2023-06-01 PROCEDURE — C9113 INJ PANTOPRAZOLE SODIUM, VIA: HCPCS | Performed by: PHYSICIAN ASSISTANT

## 2023-06-01 PROCEDURE — 85025 COMPLETE CBC W/AUTO DIFF WBC: CPT | Performed by: PHYSICIAN ASSISTANT

## 2023-06-01 PROCEDURE — 99232 SBSQ HOSP IP/OBS MODERATE 35: CPT | Performed by: PHYSICIAN ASSISTANT

## 2023-06-01 RX ADMIN — ONDANSETRON 4 MG: 2 INJECTION INTRAMUSCULAR; INTRAVENOUS at 17:35

## 2023-06-01 RX ADMIN — HYDROMORPHONE HYDROCHLORIDE 1 MG: 1 INJECTION, SOLUTION INTRAMUSCULAR; INTRAVENOUS; SUBCUTANEOUS at 21:12

## 2023-06-01 RX ADMIN — LORAZEPAM 1 MG: 2 INJECTION INTRAMUSCULAR; INTRAVENOUS at 07:13

## 2023-06-01 RX ADMIN — DIPHENHYDRAMINE HYDROCHLORIDE 50 MG: 50 INJECTION, SOLUTION INTRAMUSCULAR; INTRAVENOUS at 17:30

## 2023-06-01 RX ADMIN — SODIUM CHLORIDE 100 ML/HR: 0.9 INJECTION, SOLUTION INTRAVENOUS at 21:24

## 2023-06-01 RX ADMIN — FAMOTIDINE 20 MG: 10 INJECTION, SOLUTION INTRAVENOUS at 08:48

## 2023-06-01 RX ADMIN — FAMOTIDINE 20 MG: 10 INJECTION, SOLUTION INTRAVENOUS at 21:13

## 2023-06-01 RX ADMIN — METHYLPREDNISOLONE SODIUM SUCCINATE 20 MG: 40 INJECTION, POWDER, FOR SOLUTION INTRAMUSCULAR; INTRAVENOUS at 21:13

## 2023-06-01 RX ADMIN — ONDANSETRON 4 MG: 2 INJECTION INTRAMUSCULAR; INTRAVENOUS at 11:48

## 2023-06-01 RX ADMIN — PANTOPRAZOLE SODIUM 40 MG: 40 INJECTION, POWDER, FOR SOLUTION INTRAVENOUS at 06:01

## 2023-06-01 RX ADMIN — ONDANSETRON 4 MG: 2 INJECTION INTRAMUSCULAR; INTRAVENOUS at 02:32

## 2023-06-01 RX ADMIN — HYDROMORPHONE HYDROCHLORIDE 1 MG: 1 INJECTION, SOLUTION INTRAMUSCULAR; INTRAVENOUS; SUBCUTANEOUS at 08:48

## 2023-06-01 RX ADMIN — LORAZEPAM 1 MG: 2 INJECTION INTRAMUSCULAR; INTRAVENOUS at 17:35

## 2023-06-01 RX ADMIN — DIPHENHYDRAMINE HYDROCHLORIDE 50 MG: 50 INJECTION, SOLUTION INTRAMUSCULAR; INTRAVENOUS at 11:47

## 2023-06-01 RX ADMIN — HYDROMORPHONE HYDROCHLORIDE 1 MG: 1 INJECTION, SOLUTION INTRAMUSCULAR; INTRAVENOUS; SUBCUTANEOUS at 15:55

## 2023-06-01 RX ADMIN — METHYLPREDNISOLONE SODIUM SUCCINATE 20 MG: 40 INJECTION, POWDER, FOR SOLUTION INTRAMUSCULAR; INTRAVENOUS at 15:55

## 2023-06-01 RX ADMIN — DIPHENHYDRAMINE HYDROCHLORIDE 50 MG: 50 INJECTION, SOLUTION INTRAMUSCULAR; INTRAVENOUS at 06:01

## 2023-06-01 RX ADMIN — SODIUM CHLORIDE 100 ML/HR: 0.9 INJECTION, SOLUTION INTRAVENOUS at 00:40

## 2023-06-01 RX ADMIN — LORAZEPAM 1 MG: 2 INJECTION INTRAMUSCULAR; INTRAVENOUS at 00:58

## 2023-06-01 RX ADMIN — HYDROMORPHONE HYDROCHLORIDE 1 MG: 1 INJECTION, SOLUTION INTRAMUSCULAR; INTRAVENOUS; SUBCUTANEOUS at 02:32

## 2023-06-01 RX ADMIN — METHYLPREDNISOLONE SODIUM SUCCINATE 20 MG: 40 INJECTION, POWDER, FOR SOLUTION INTRAMUSCULAR; INTRAVENOUS at 06:01

## 2023-06-01 RX ADMIN — DIPHENHYDRAMINE HYDROCHLORIDE 50 MG: 50 INJECTION, SOLUTION INTRAMUSCULAR; INTRAVENOUS at 00:35

## 2023-06-01 RX ADMIN — DIPHENHYDRAMINE HYDROCHLORIDE 50 MG: 50 INJECTION, SOLUTION INTRAMUSCULAR; INTRAVENOUS at 23:42

## 2023-06-01 RX ADMIN — LORAZEPAM 1 MG: 2 INJECTION INTRAMUSCULAR; INTRAVENOUS at 11:48

## 2023-06-01 RX ADMIN — ONDANSETRON 4 MG: 2 INJECTION INTRAMUSCULAR; INTRAVENOUS at 06:00

## 2023-06-01 NOTE — PLAN OF CARE
Problem: MOBILITY - ADULT  Goal: Maintain or return to baseline ADL function  Description: INTERVENTIONS:  -  Assess patient's ability to carry out ADLs; assess patient's baseline for ADL function and identify physical deficits which impact ability to perform ADLs (bathing, care of mouth/teeth, toileting, grooming, dressing, etc )  - Assess/evaluate cause of self-care deficits   - Assess range of motion  - Assess patient's mobility; develop plan if impaired  - Assess patient's need for assistive devices and provide as appropriate  - Encourage maximum independence but intervene and supervise when necessary  - Involve family in performance of ADLs  - Assess for home care needs following discharge   - Consider OT consult to assist with ADL evaluation and planning for discharge  - Provide patient education as appropriate  Outcome: Progressing  Goal: Maintains/Returns to pre admission functional level  Description: INTERVENTIONS:  - Perform BMAT or MOVE assessment daily    - Set and communicate daily mobility goal to care team and patient/family/caregiver  - Collaborate with rehabilitation services on mobility goals if consulted  - Perform Range of Motion 3-4 times a day  - Reposition patient every 2 hours    - Dangle patient 3 times a day  - Stand patient 3 times a day  - Ambulate patient 3 times a day  - Out of bed to chair 3 times a day   - Out of bed for meals 3 times a day  - Out of bed for toileting  - Record patient progress and toleration of activity level   Outcome: Progressing

## 2023-06-02 ENCOUNTER — HOSPITAL ENCOUNTER (OUTPATIENT)
Dept: INFUSION CENTER | Facility: HOSPITAL | Age: 48
End: 2023-06-02

## 2023-06-02 LAB
ALBUMIN SERPL BCP-MCNC: 3.4 G/DL (ref 3.5–5)
ALP SERPL-CCNC: 64 U/L (ref 34–104)
ALT SERPL W P-5'-P-CCNC: 19 U/L (ref 7–52)
ANION GAP SERPL CALCULATED.3IONS-SCNC: 7 MMOL/L (ref 4–13)
AST SERPL W P-5'-P-CCNC: 18 U/L (ref 13–39)
BASOPHILS # BLD AUTO: 0.05 THOUSANDS/ÂΜL (ref 0–0.1)
BASOPHILS NFR BLD AUTO: 0 % (ref 0–1)
BILIRUB SERPL-MCNC: 0.22 MG/DL (ref 0.2–1)
BUN SERPL-MCNC: 10 MG/DL (ref 5–25)
CALCIUM ALBUM COR SERPL-MCNC: 8.8 MG/DL (ref 8.3–10.1)
CALCIUM SERPL-MCNC: 8.3 MG/DL (ref 8.4–10.2)
CHLORIDE SERPL-SCNC: 104 MMOL/L (ref 96–108)
CO2 SERPL-SCNC: 28 MMOL/L (ref 21–32)
CREAT SERPL-MCNC: 0.57 MG/DL (ref 0.6–1.3)
EOSINOPHIL # BLD AUTO: 0 THOUSAND/ÂΜL (ref 0–0.61)
EOSINOPHIL NFR BLD AUTO: 0 % (ref 0–6)
ERYTHROCYTE [DISTWIDTH] IN BLOOD BY AUTOMATED COUNT: 14 % (ref 11.6–15.1)
FERRITIN SERPL-MCNC: 37 NG/ML (ref 11–307)
GFR SERPL CREATININE-BSD FRML MDRD: 108 ML/MIN/1.73SQ M
GLUCOSE SERPL-MCNC: 125 MG/DL (ref 65–140)
HCT VFR BLD AUTO: 37.9 % (ref 34.8–46.1)
HGB BLD-MCNC: 12.5 G/DL (ref 11.5–15.4)
IMM GRANULOCYTES # BLD AUTO: 0.22 THOUSAND/UL (ref 0–0.2)
IMM GRANULOCYTES NFR BLD AUTO: 2 % (ref 0–2)
IRON SATN MFR SERPL: 23 % (ref 15–50)
IRON SERPL-MCNC: 53 UG/DL (ref 50–170)
LYMPHOCYTES # BLD AUTO: 1.54 THOUSANDS/ÂΜL (ref 0.6–4.47)
LYMPHOCYTES NFR BLD AUTO: 11 % (ref 14–44)
MCH RBC QN AUTO: 30.5 PG (ref 26.8–34.3)
MCHC RBC AUTO-ENTMCNC: 33 G/DL (ref 31.4–37.4)
MCV RBC AUTO: 92 FL (ref 82–98)
MONOCYTES # BLD AUTO: 0.8 THOUSAND/ÂΜL (ref 0.17–1.22)
MONOCYTES NFR BLD AUTO: 6 % (ref 4–12)
NEUTROPHILS # BLD AUTO: 10.86 THOUSANDS/ÂΜL (ref 1.85–7.62)
NEUTS SEG NFR BLD AUTO: 81 % (ref 43–75)
NRBC BLD AUTO-RTO: 1 /100 WBCS
PLATELET # BLD AUTO: 488 THOUSANDS/UL (ref 149–390)
PMV BLD AUTO: 9.7 FL (ref 8.9–12.7)
POTASSIUM SERPL-SCNC: 3.4 MMOL/L (ref 3.5–5.3)
PROT SERPL-MCNC: 5.9 G/DL (ref 6.4–8.4)
RBC # BLD AUTO: 4.1 MILLION/UL (ref 3.81–5.12)
SODIUM SERPL-SCNC: 139 MMOL/L (ref 135–147)
TIBC SERPL-MCNC: 227 UG/DL (ref 250–450)
WBC # BLD AUTO: 13.47 THOUSAND/UL (ref 4.31–10.16)

## 2023-06-02 PROCEDURE — 83550 IRON BINDING TEST: CPT | Performed by: PHYSICIAN ASSISTANT

## 2023-06-02 PROCEDURE — 99232 SBSQ HOSP IP/OBS MODERATE 35: CPT | Performed by: FAMILY MEDICINE

## 2023-06-02 PROCEDURE — 99232 SBSQ HOSP IP/OBS MODERATE 35: CPT | Performed by: INTERNAL MEDICINE

## 2023-06-02 PROCEDURE — 82728 ASSAY OF FERRITIN: CPT | Performed by: PHYSICIAN ASSISTANT

## 2023-06-02 PROCEDURE — C9113 INJ PANTOPRAZOLE SODIUM, VIA: HCPCS | Performed by: PHYSICIAN ASSISTANT

## 2023-06-02 PROCEDURE — 83540 ASSAY OF IRON: CPT | Performed by: PHYSICIAN ASSISTANT

## 2023-06-02 PROCEDURE — 85025 COMPLETE CBC W/AUTO DIFF WBC: CPT | Performed by: PHYSICIAN ASSISTANT

## 2023-06-02 PROCEDURE — 80053 COMPREHEN METABOLIC PANEL: CPT | Performed by: PHYSICIAN ASSISTANT

## 2023-06-02 RX ORDER — SODIUM CHLORIDE 9 MG/ML
75 INJECTION, SOLUTION INTRAVENOUS CONTINUOUS
Status: DISPENSED | OUTPATIENT
Start: 2023-06-02 | End: 2023-06-03

## 2023-06-02 RX ORDER — POTASSIUM CHLORIDE 14.9 MG/ML
20 INJECTION INTRAVENOUS ONCE
Status: COMPLETED | OUTPATIENT
Start: 2023-06-02 | End: 2023-06-02

## 2023-06-02 RX ORDER — POTASSIUM CHLORIDE 14.9 MG/ML
20 INJECTION INTRAVENOUS
Status: DISPENSED | OUTPATIENT
Start: 2023-06-02 | End: 2023-06-02

## 2023-06-02 RX ORDER — PREDNISONE 20 MG/1
40 TABLET ORAL DAILY
Status: DISCONTINUED | OUTPATIENT
Start: 2023-06-03 | End: 2023-06-03

## 2023-06-02 RX ADMIN — METHYLPREDNISOLONE SODIUM SUCCINATE 20 MG: 40 INJECTION, POWDER, FOR SOLUTION INTRAMUSCULAR; INTRAVENOUS at 06:34

## 2023-06-02 RX ADMIN — FAMOTIDINE 20 MG: 10 INJECTION, SOLUTION INTRAVENOUS at 08:58

## 2023-06-02 RX ADMIN — DIPHENHYDRAMINE HYDROCHLORIDE 50 MG: 50 INJECTION, SOLUTION INTRAMUSCULAR; INTRAVENOUS at 23:19

## 2023-06-02 RX ADMIN — HYDROMORPHONE HYDROCHLORIDE 1 MG: 1 INJECTION, SOLUTION INTRAMUSCULAR; INTRAVENOUS; SUBCUTANEOUS at 23:19

## 2023-06-02 RX ADMIN — ONDANSETRON 4 MG: 2 INJECTION INTRAMUSCULAR; INTRAVENOUS at 12:32

## 2023-06-02 RX ADMIN — SODIUM CHLORIDE 100 ML/HR: 0.9 INJECTION, SOLUTION INTRAVENOUS at 09:13

## 2023-06-02 RX ADMIN — PANTOPRAZOLE SODIUM 40 MG: 40 INJECTION, POWDER, FOR SOLUTION INTRAVENOUS at 08:58

## 2023-06-02 RX ADMIN — ONDANSETRON 4 MG: 2 INJECTION INTRAMUSCULAR; INTRAVENOUS at 06:35

## 2023-06-02 RX ADMIN — LORAZEPAM 1 MG: 2 INJECTION INTRAMUSCULAR; INTRAVENOUS at 18:50

## 2023-06-02 RX ADMIN — FAMOTIDINE 20 MG: 10 INJECTION, SOLUTION INTRAVENOUS at 21:20

## 2023-06-02 RX ADMIN — ONDANSETRON 4 MG: 2 INJECTION INTRAMUSCULAR; INTRAVENOUS at 18:36

## 2023-06-02 RX ADMIN — DIPHENHYDRAMINE HYDROCHLORIDE 50 MG: 50 INJECTION, SOLUTION INTRAMUSCULAR; INTRAVENOUS at 17:14

## 2023-06-02 RX ADMIN — DIPHENHYDRAMINE HYDROCHLORIDE 50 MG: 50 INJECTION, SOLUTION INTRAMUSCULAR; INTRAVENOUS at 12:27

## 2023-06-02 RX ADMIN — LORAZEPAM 1 MG: 2 INJECTION INTRAMUSCULAR; INTRAVENOUS at 00:12

## 2023-06-02 RX ADMIN — SODIUM CHLORIDE 75 ML/HR: 0.9 INJECTION, SOLUTION INTRAVENOUS at 18:35

## 2023-06-02 RX ADMIN — METHYLPREDNISOLONE SODIUM SUCCINATE 20 MG: 40 INJECTION, POWDER, FOR SOLUTION INTRAMUSCULAR; INTRAVENOUS at 14:12

## 2023-06-02 RX ADMIN — POTASSIUM CHLORIDE 20 MEQ: 14.9 INJECTION, SOLUTION INTRAVENOUS at 18:33

## 2023-06-02 RX ADMIN — HYDROMORPHONE HYDROCHLORIDE 1 MG: 1 INJECTION, SOLUTION INTRAMUSCULAR; INTRAVENOUS; SUBCUTANEOUS at 09:15

## 2023-06-02 RX ADMIN — POTASSIUM CHLORIDE 20 MEQ: 14.9 INJECTION, SOLUTION INTRAVENOUS at 14:12

## 2023-06-02 RX ADMIN — ONDANSETRON 4 MG: 2 INJECTION INTRAMUSCULAR; INTRAVENOUS at 00:12

## 2023-06-02 RX ADMIN — LORAZEPAM 1 MG: 2 INJECTION INTRAMUSCULAR; INTRAVENOUS at 12:32

## 2023-06-02 RX ADMIN — DIPHENHYDRAMINE HYDROCHLORIDE 50 MG: 50 INJECTION, SOLUTION INTRAMUSCULAR; INTRAVENOUS at 06:32

## 2023-06-02 RX ADMIN — LORAZEPAM 1 MG: 2 INJECTION INTRAMUSCULAR; INTRAVENOUS at 06:32

## 2023-06-02 RX ADMIN — METHYLPREDNISOLONE SODIUM SUCCINATE 20 MG: 40 INJECTION, POWDER, FOR SOLUTION INTRAMUSCULAR; INTRAVENOUS at 21:20

## 2023-06-02 RX ADMIN — HYDROMORPHONE HYDROCHLORIDE 1 MG: 1 INJECTION, SOLUTION INTRAMUSCULAR; INTRAVENOUS; SUBCUTANEOUS at 03:10

## 2023-06-02 RX ADMIN — HYDROMORPHONE HYDROCHLORIDE 1 MG: 1 INJECTION, SOLUTION INTRAMUSCULAR; INTRAVENOUS; SUBCUTANEOUS at 17:13

## 2023-06-02 NOTE — PROGRESS NOTES
[address] [facility]  Progress Note  Name: Amadou Aguilera  MRN: [id number]  Unit/Bed#: 371-17 I Date of Admission: 5/29/2023   Date of Service: 6/2/2023 I Hospital Day: 4    Assessment/Plan   * Crohn's disease of colon with complication [facility])  Assessment & Plan  · Stable, no bloody diarrhea  · On Stelara and omalizumab infusion treatment with IV hydration-next scheduled on 5/30/2023  · Patient missed all infusions this past week  · She reports taking budesonide 3 mg 3 times daily at home  She brought her home medication which she has been taking and is now discontinued  · Received Solu-Medrol through today --> starting tomorrow the patient is to resume her home prednisone  The patient states that she does not want to use hospital formulary and would like to use her home medication  She is anticipated to be discharged home tomorrow  Seizure [facility])  Assessment & Plan  · Ativan as needed  · Not on any other seizure medications at home  · EEG completed  Patient had one of her typical events  No epileptic activity associated with the event   Consistent with psychogenic seizures  · Recommend outpatient therapy    Decreased oral intake  Assessment & Plan  Decreased p o  intake for the past week due to persistent vomiting and diarrhea    · Patient is not tolerating diet, all her meals are brought from home by her  due to her reported corn allergy    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[facility] [address] )  Assessment & Plan  · Follows with mast cell activation specialist at the [facility]  · Continue Benadryl 50 mg IM twice daily, Protonix 40 mg twice daily, pepcid twice daily  · Monitor symptoms    Hypothyroidism (acquired)  Assessment & Plan  · TSH WNL 1 889  · Continue levothyroxine 100 mcg daily    SIRS (systemic inflammatory response syndrome) (HCC)  Assessment & Plan  SIRS, resolved, possibly due to Crohn's disease/Viral gastroenteritis; presented with WBC 13 25 and   · Stool cultures negative  · treated with IVF, IV Medrol, IV Protonix,   · GI input appreciated  · Monitor VS, CBC            Vomiting and diarrhea  Assessment & Plan  Ongoing nonbloody nonbilious emesis and nonbloody diarrhea since EGD colonoscopy on 2023  · Reported this had been worsening over the past week  · Vomiting improved with IV Zofran-continue p o  as needed  Diarrhea appears resolved  · Stool C  difficile and PCR negative  · Received 1 5 L normal saline bolus in ED, continued normal saline 100 cc/h for maintenance - decrease to 75 mL/hr then discontinue  · Met SIRS criteria on admission likely due to Crohn's vs viral gastroenteritis and resovled  · Replace electrolytes as needed             VTE Pharmacologic Prophylaxis: VTE Score: 3 Moderate Risk (Score 3-4) - Pharmacological DVT Prophylaxis Ordered: enoxaparin (Lovenox)  Patient Centered Rounds: I performed bedside rounds with nursing staff today  Discussions with Specialists or Other Care Team Provider: GI    Education and Discussions with Family / Patient: patient  Total Time Spent on Date of Encounter in care of patient: 55 minutes This time was spent on one or more of the following: performing physical exam; counseling and coordination of care; obtaining or reviewing history; documenting in the medical record; reviewing/ordering tests, medications or procedures; communicating with other healthcare professionals and discussing with patient's family/caregivers  Current Length of Stay: 4 day(s)  Current Patient Status: Inpatient   Certification Statement: The patient will continue to require additional inpatient hospital stay due to close monitoring  Discharge Plan: Anticipate discharge in 24-48 hrs to home  Code Status: Level 1 - Full Code    Subjective:   Patient seen and examined  She reports improvement in her diarrhea, tolerating meals from home  Feels overall better attributing this to IV hydration      Objective:     Vitals:   Temp (24hrs), Av 3 °F (36 8 °C), Min:97 8 °F (36 6 °C), Max:98 6 °F (37 °C)    Temp:  [97 8 °F (36 6 °C)-98 6 °F (37 °C)] 98 6 °F (37 °C)  HR:  [62-83] 83  Resp:  [16-20] 16  BP: (126-140)/(78-96) 126/78  SpO2:  [94 %-98 %] 95 %  Body mass index is 25 09 kg/m²  Input and Output Summary (last 24 hours): Intake/Output Summary (Last 24 hours) at 6/2/2023 1450  Last data filed at 6/2/2023 0913  Gross per 24 hour   Intake 2541 67 ml   Output --   Net 2541 67 ml       Physical Exam:   Physical Exam  Constitutional:       General: She is not in acute distress  HENT:      Head: Normocephalic and atraumatic  Nose: No congestion  Eyes:      Conjunctiva/sclera: Conjunctivae normal    Cardiovascular:      Rate and Rhythm: Normal rate and regular rhythm  Pulmonary:      Effort: No respiratory distress  Breath sounds: No wheezing or rales  Abdominal:      Tenderness: There is abdominal tenderness (generalized, mild)  Musculoskeletal:      Right lower leg: No edema  Left lower leg: No edema  Skin:     General: Skin is warm and dry  Neurological:      Mental Status: She is oriented to person, place, and time     Psychiatric:         Mood and Affect: Mood normal           Additional Data:     Labs:  Results from last 7 days   Lab Units 06/02/23  0418   EOS PCT % 0   HEMATOCRIT % 37 9   HEMOGLOBIN g/dL 12 5   LYMPHS PCT % 11*   MONOS PCT % 6   NEUTROS PCT % 81*   PLATELETS Thousands/uL 488*   WBC Thousand/uL 13 47*     Results from last 7 days   Lab Units 06/02/23  0418   ANION GAP mmol/L 7   ALBUMIN g/dL 3 4*   ALK PHOS U/L 64   ALT U/L 19   AST U/L 18   BUN mg/dL 10   CALCIUM mg/dL 8 3*   CHLORIDE mmol/L 104   CO2 mmol/L 28   CREATININE mg/dL 0 57*   GLUCOSE RANDOM mg/dL 125   POTASSIUM mmol/L 3 4*   SODIUM mmol/L 139   TOTAL BILIRUBIN mg/dL 0 22                 Results from last 7 days   Lab Units 05/30/23  0504   PROCALCITONIN ng/ml <0 05       Lines/Drains:  Invasive Devices     Peripheral Intravenous Line  Duration Peripheral IV 06/02/23 Right;Ventral (anterior) Forearm <1 day                  Imaging: no new    Recent Cultures (last 7 days):   Results from last 7 days   Lab Units 05/29/23  2234   C DIFF TOXIN B BY PCR  Negative       Last 24 Hours Medication List:   Current Facility-Administered Medications   Medication Dose Route Frequency Provider Last Rate   • acetaminophen  650 mg Oral Q6H PRN Iker Severe, DO     • albuterol  2 puff Inhalation Q4H PRN Iker Severe, DO     • butalbital-acetaminophen-caffeine  1 tablet Oral Q8H PRN Iker Severe, DO     • cholecalciferol  1,000 Units Oral BID Iker Severe, DO     • diphenhydrAMINE  50 mg Intramuscular Q6H Juan Nguyen MD     • diphenhydrAMINE  50 mg Intravenous Q6H PRN Katrin Pastrana PA-C     • enoxaparin  40 mg Subcutaneous Daily Betzy Grigsby DO     • famotidine  20 mg Intravenous Q12H [address] Katrin Pastrana PA-C     • HYDROmorphone  1 mg Intravenous Q4H PRN Katrin Pastrana PA-C     • levothyroxine  100 mcg Oral Early Morning Betzy Grigsby DO     • LORazepam  1 mg Intravenous Q4H PRN Phil Tripp DO     • methylPREDNISolone sodium succinate  20 mg Intravenous [facility] JONATHAN Miller     • ondansetron  4 mg Intravenous Q6H PRN Juan Nguyen MD     • pantoprazole  40 mg Intravenous Q24H [address] Katrin Pastrana PA-C     • potassium chloride  20 mEq Intravenous Q2H María Sterling MD 20 mEq (06/02/23 1412)   • potassium-sodium phosphateS  1 tablet Oral TID With Meals Juan Nguyen MD     • predniSONE  20 mg Oral Daily PRN Iker Severe, DO     • sodium chloride  100 mL/hr Intravenous Continuous Betzy Calista  mL/hr (06/02/23 0913)        Today, Patient Was Seen By: Maxwell Goodpasture, MD    **Please Note: This note may have been constructed using a voice recognition system  **

## 2023-06-02 NOTE — PROGRESS NOTES
Progress Note- Elizabeth Epps 46 y o  female MRN: [id number]    Unit/Bed#: 664-31 Encounter: [id number]      Assessment and Plan:  Patient is a 46 y o  female who has a historyIBD (Crohn's disease versus indeterminate colitis), previously on anti-TNF therapy but complicated by drug-induced lupus, she is currently prescribed Stelara (last dose of Stelara in February 2023), mast cell activation syndrome admitted with nausea, vomiting, and diarrhea  Overall, she is progressing with supportive care with improvement in her pain, nausea, and diarrhea  However, we are at a standstill as the patient continues to refuse p o  medications because of her corn allergy and the fear that going back on her oral medications will worsen her pain and symptoms, affecting her appetite and overall ability to go home and function normally  1  Vomiting & Diarrhea  2  Chron's disease of colon with complication  3  Immunosuppressed state due to chronic steroid use  4  Mast Cell Activation Syndrome   We discussed with the patient that at this point, as we need to start preparing her for discharge we would like to start her on oral prednisone 40 mg daily as that is the standard, for her current IBD flare/colitis as well as her MAST cell syndrome  With regards to being sent home on 60 mg or 80 mg a day, I did discuss that this is not our normal protocol and that I would discuss it with the attending    The patient was agreeable and verbalized an understanding     -We discussed that the goal would be to send her home on the daily prednisone until she can be seen in the office by Dr Sherry Hodgson to manage the IBD flare until she can be put back on some kind of long-term regimen whether it is to go back on the Stelara or try the previously discussed [place], which the patient is still very hesitant about through all of her research and because of her medication induced lupus     -Continue IV fluids  -Continue medications as ordered  -Continue "supportive care    GI will continue to follow  ______________________________________________________________________    Subjective:     Patient is a 46 y o  female who has a historyIBD (Crohn's disease versus indeterminate colitis), previously on anti-TNF therapy but complicated by drug-induced lupus, she is currently prescribed Stelara, but has not had a dose of Stelara in 3 months, mast cell activation syndrome admitted with nausea, vomiting, and diarrhea  The patient reports that since yesterday she had 1 small, soft, but semiformed bowel movement without any diarrhea, blood or liquid  She reports that her that she does continue with some upper abdominal pain which she feels is related to the bladder discomfort and the feeling of not being able to empty her bladder  Her bladder scans showed that she is fully emptying her bladder  She does feel that she is more \"gassy,\" today and is passing frequent flatus  Overall, she appears and reports that she is feeling better as she appears and reports to feel more hydrated  As of this morning the patient is still refusing oral medications and has not started on the oral prednisone 40 mg daily as we had recommended to start yesterday and reports that in the past when she was switched over to oral prednisone it was at 60 or even 80 mg a day and then slowly titrated down on an outpatient basis  The Budesonide was discontinued while she is on the IV steroids as she shouldn't be on both         Medication Administration - last 24 hours from 06/01/2023 0932 to 06/02/2023 0932       Date/Time Order Dose Route Action Action by     06/01/2023 1545 EDT butalbital-acetaminophen-caffeine (FIORICET,ESGIC) -40 mg per tablet 1 tablet -- Oral MAR Darvin Berry RN     06/01/2023 1544 EDT butalbital-acetaminophen-caffeine (FIORICET,ESGIC) -40 mg per tablet 1 tablet -- Oral MAR Hold Automatic Transfer Provider     06/02/2023 0830 EDT cholecalciferol (VITAMIN D) " oral liquid 1,000 Units 1,000 Units Oral Not Given Arleth Plain, RN     06/01/2023 2107 EDT cholecalciferol (VITAMIN D) oral liquid 1,000 Units 1,000 Units Oral Not Given Mi Deleon, RN     06/01/2023 1545 EDT cholecalciferol (VITAMIN D) oral liquid 1,000 Units -- Oral ROSETTA Dunn Yhoan, RN     06/01/2023 1544 EDT cholecalciferol (VITAMIN D) oral liquid 1,000 Units -- Oral [facility] Hold Automatic Transfer Provider     06/01/2023 1545 EDT albuterol (PROVENTIL HFA,VENTOLIN HFA) inhaler 2 puff -- Inhalation ROSETTA Almanzar, RN     06/01/2023 1544 EDT albuterol (PROVENTIL HFA,VENTOLIN HFA) inhaler 2 puff -- Inhalation MAR Hold Automatic Transfer Provider     06/02/2023 8127 EDT levothyroxine tablet 100 mcg 100 mcg Oral Refused Mi Deleon, RN     06/01/2023 1546 EDT levothyroxine tablet 100 mcg -- Oral MAR Mona Seeds, RN     06/01/2023 1544 EDT levothyroxine tablet 100 mcg -- Oral MAR Hold Automatic Transfer Provider     06/01/2023 1548 EDT predniSONE tablet 20 mg -- Oral MAR Mona Yohan, RN     06/01/2023 1544 EDT predniSONE tablet 20 mg -- Oral MAR Hold Automatic Transfer Provider     06/01/2023 1545 EDT acetaminophen (TYLENOL) tablet 650 mg -- Oral ROSETTA Dunn Yohan, RN     06/01/2023 1544 EDT acetaminophen (TYLENOL) tablet 650 mg -- Oral MAR Hold Automatic Transfer Provider     06/02/2023 0913 EDT sodium chloride 0 9 % infusion 100 mL/hr Intravenous adi Annemarieelis Shearer, RN     06/01/2023 2124 EDT sodium chloride 0 9 % infusion 100 mL/hr Intravenous 230 Wit [facility]     06/02/2023 6534 EDT enoxaparin (LOVENOX) subcutaneous injection 40 mg 40 mg Subcutaneous Not Given Arleth Plain, RN     06/01/2023 1546 EDT enoxaparin (LOVENOX) subcutaneous injection 40 mg -- Subcutaneous MAR Mona Seeds, RN     06/01/2023 1544 EDT enoxaparin (LOVENOX) subcutaneous injection 40 mg -- Subcutaneous MAR Hold Automatic Transfer Provider     06/01/2023 2107 EDT budesonide (ENTOCORT EC) capsule 3 mg 3 mg Oral Not Given Lucas Rockwell, ROLLY     06/01/2023 1736 EDT budesonide (ENTOCORT EC) capsule 3 mg 3 mg Oral Not Given Abram Lozoya, RN     06/01/2023 1545 EDT budesonide (ENTOCORT EC) capsule 3 mg -- Oral ROSETTA Stark, RN     06/01/2023 1544 EDT budesonide (ENTOCORT EC) capsule 3 mg -- Oral MAR Hold Automatic Transfer Provider     06/02/2023 0812 EDT ondansetron (ZOFRAN) injection 4 mg 4 mg Intravenous Given Lucas Rockwell, ROLLY     06/02/2023 0012 EDT ondansetron (ZOFRAN) injection 4 mg 4 mg Intravenous Given Lucas Rockwell, ROLLY     06/01/2023 1735 EDT ondansetron (ZOFRAN) injection 4 mg 4 mg Intravenous Given Abram Lozoya, RN     06/01/2023 1547 EDT ondansetron (ZOFRAN) injection 4 mg -- Intravenous ROSETTA Stark, RN     06/01/2023 1544 EDT ondansetron (ZOFRAN) injection 4 mg -- Intravenous MAR Hold Automatic Transfer Provider     06/01/2023 1148 EDT ondansetron (ZOFRAN) injection 4 mg 4 mg Intravenous Given Jayshree Lemon, ROLLY     06/02/2023 8427 EDT diphenhydrAMINE (BENADRYL) injection 50 mg 50 mg Intramuscular Given Lucas Rockwell, ROLLY     06/01/2023 2342 EDT diphenhydrAMINE (BENADRYL) injection 50 mg 50 mg Intramuscular Given Lucas Rockwell, ROLLY     06/01/2023 1730 EDT diphenhydrAMINE (BENADRYL) injection 50 mg 50 mg Intramuscular Given Abram Lozoya, RN     06/01/2023 1546 EDT diphenhydrAMINE (BENADRYL) injection 50 mg -- Intramuscular ROSETTA Stark, ROLLY     06/01/2023 1544 EDT diphenhydrAMINE (BENADRYL) injection 50 mg -- Intramuscular MAR Hold Automatic Transfer Provider     06/01/2023 1147 EDT diphenhydrAMINE (BENADRYL) injection 50 mg 50 mg Intramuscular Given Jayshree Lemon RN     06/02/2023 5256 EDT LORazepam (ATIVAN) injection 1 mg 1 mg Intravenous Given Lucas Rockwell RN     06/02/2023 0012 EDT LORazepam (ATIVAN) injection 1 mg 1 mg Intravenous Given Lucas Rockwell RN     06/01/2023 1735 EDT LORazepam (ATIVAN) injection 1 mg 1 mg Intravenous Given Douglas Avila, RN     06/01/2023 1547 EDT LORazepam (ATIVAN) injection 1 mg -- Intravenous MAR Theodore Cheung, RN     06/01/2023 1544 EDT LORazepam (ATIVAN) injection 1 mg -- Intravenous MAR Hold Automatic Transfer Provider     06/01/2023 1148 EDT LORazepam (ATIVAN) injection 1 mg 1 mg Intravenous Given Nitish Calero, RN     06/02/2023 0831 EDT potassium-sodium phosphateS (K-PHOS,PHOSPHA 250) -250 mg tablet 1 tablet 1 tablet Oral Not Given Azul Hines, RN     06/01/2023 1733 EDT potassium-sodium phosphateS (K-PHOS,PHOSPHA 250) -250 mg tablet 1 tablet 1 tablet Oral Not Given Douglas Avila, RN     06/01/2023 1547 EDT potassium-sodium phosphateS (K-PHOS,PHOSPHA 250) -250 mg tablet 1 tablet -- Oral MAR Theodore Cheung, RN     06/01/2023 1544 EDT potassium-sodium phosphateS (K-PHOS,PHOSPHA 250) -250 mg tablet 1 tablet -- Oral MAR Hold Automatic Transfer Provider     06/01/2023 1137 EDT potassium-sodium phosphateS (K-PHOS,PHOSPHA 250) -250 mg tablet 1 tablet 1 tablet Oral Not Given Nitish Calero, RN     06/02/2023 2998 EDT methylPREDNISolone sodium succinate (Solu-MEDROL) injection 20 mg 20 mg Intravenous Given Michael Peterson, RN     06/01/2023 2113 EDT methylPREDNISolone sodium succinate (Solu-MEDROL) injection 20 mg 20 mg Intravenous Given Michael Peterson, RN     06/01/2023 1555 EDT methylPREDNISolone sodium succinate (Solu-MEDROL) injection 20 mg 20 mg Intravenous Given Brett Cuenca, RN     06/01/2023 1547 EDT methylPREDNISolone sodium succinate (Solu-MEDROL) injection 20 mg -- Intravenous MAR Theodore Cheung, RN     06/01/2023 1544 EDT methylPREDNISolone sodium succinate (Solu-MEDROL) injection 20 mg -- Intravenous MAR Hold Automatic Transfer Provider     06/01/2023 1730 EDT diphenhydrAMINE (BENADRYL) injection 50 mg 50 mg Intravenous Given Douglas Avila, ROLLY     06/01/2023 1317 EDT diphenhydrAMINE (BENADRYL) injection 50 mg -- Intravenous ROSETTA Beckham "Pretty Hansa, RN     06/01/2023 1544 EDT diphenhydrAMINE (BENADRYL) injection 50 mg -- Intravenous MAR Hold Automatic Transfer Provider     06/02/2023 1108 EDT pantoprazole (PROTONIX) injection 40 mg 40 mg Intravenous Given Adilia Luque, ROLLY     06/01/2023 1547 EDT pantoprazole (PROTONIX) injection 40 mg -- Intravenous MAR Domenico Bundy, ROLLY     06/01/2023 1544 EDT pantoprazole (PROTONIX) injection 40 mg -- Intravenous MAR Hold Automatic Transfer Provider     06/02/2023 5483 EDT Famotidine (PF) (PEPCID) injection 20 mg 20 mg Intravenous Given Adilia Luque, ROLLY     06/01/2023 2113 EDT Famotidine (PF) (PEPCID) injection 20 mg 20 mg Intravenous Given Jennifer Kiran, ROLLY     06/01/2023 1546 EDT Famotidine (PF) (PEPCID) injection 20 mg -- Intravenous MAR Domenico Bundy, ROLLY     06/01/2023 1544 EDT Famotidine (PF) (PEPCID) injection 20 mg -- Intravenous MAR Hold Automatic Transfer Provider     06/02/2023 0915 EDT HYDROmorphone (DILAUDID) injection 1 mg 1 mg Intravenous Given Adilia Luque, RN     06/02/2023 0310 EDT HYDROmorphone (DILAUDID) injection 1 mg 1 mg Intravenous Given Jennifer Kiran, RN     06/01/2023 2112 EDT HYDROmorphone (DILAUDID) injection 1 mg 1 mg Intravenous Given Jennifer Kiran, RN     06/01/2023 1555 EDT HYDROmorphone (DILAUDID) injection 1 mg 1 mg Intravenous Given Vicki Arboleda, RN     06/01/2023 1546 EDT HYDROmorphone (DILAUDID) injection 1 mg -- Intravenous MAR Unhold Vicki Arboleda, ROLLY     06/01/2023 1544 EDT HYDROmorphone (DILAUDID) injection 1 mg -- Intravenous MAR Hold Automatic Transfer Provider          Objective:     Vitals: Blood pressure 140/96, pulse 67, temperature 97 8 °F (36 6 °C), resp  rate 16, height 5' 5\" (1 651 m), weight 68 4 kg (150 lb 12 7 oz), SpO2 98 %, not currently breastfeeding  ,Body mass index is 25 09 kg/m²        Intake/Output Summary (Last 24 hours) at 6/2/2023 0932  Last data filed at 6/2/2023 0913  Gross per 24 hour   Intake 2541 67 ml   Output --   Net 3437 58 " ml       Physical Exam:   General Appearance: Awake and alert, in no acute distress  Abdomen: Soft, non-tender, non-distended; bowel sounds normal; no masses or no organomegaly    Invasive Devices     Peripheral Intravenous Line  Duration           Peripheral IV 06/02/23 Right;Ventral (anterior) Forearm <1 day                Lab Results:  No results displayed because visit has over 200 results  Imaging Studies: I have personally reviewed pertinent imaging studies

## 2023-06-02 NOTE — PLAN OF CARE
Problem: MOBILITY - ADULT  Goal: Maintain or return to baseline ADL function  Description: INTERVENTIONS:  -  Assess patient's ability to carry out ADLs; assess patient's baseline for ADL function and identify physical deficits which impact ability to perform ADLs (bathing, care of mouth/teeth, toileting, grooming, dressing, etc )  - Assess/evaluate cause of self-care deficits   - Assess range of motion  - Assess patient's mobility; develop plan if impaired  - Assess patient's need for assistive devices and provide as appropriate  - Encourage maximum independence but intervene and supervise when necessary  - Involve family in performance of ADLs  - Assess for home care needs following discharge   - Consider OT consult to assist with ADL evaluation and planning for discharge  - Provide patient education as appropriate  Outcome: Progressing  Goal: Maintains/Returns to pre admission functional level  Description: INTERVENTIONS:  - Perform BMAT or MOVE assessment daily    - Set and communicate daily mobility goal to care team and patient/family/caregiver  - Collaborate with rehabilitation services on mobility goals if consulted  - Perform Range of Motion 3-4 times a day  - Reposition patient every 2 hours    - Dangle patient 3 times a day  - Stand patient 3 times a day  - Ambulate patient 3 times a day  - Out of bed to chair 3 times a day   - Out of bed for meals 3 times a day  - Out of bed for toileting  - Record patient progress and toleration of activity level   Outcome: Progressing     Problem: GASTROINTESTINAL - ADULT  Goal: Minimal or absence of nausea and/or vomiting  Description: INTERVENTIONS:  - Administer IV fluids if ordered to ensure adequate hydration  - Maintain NPO status until nausea and vomiting are resolved  - Nasogastric tube if ordered  - Administer ordered antiemetic medications as needed  - Provide nonpharmacologic comfort measures as appropriate  - Advance diet as tolerated, if ordered  - Consider nutrition services referral to assist patient with adequate nutrition and appropriate food choices  Outcome: Progressing  Goal: Maintains or returns to baseline bowel function  Description: INTERVENTIONS:  - Assess bowel function  - Encourage oral fluids to ensure adequate hydration  - Administer IV fluids if ordered to ensure adequate hydration  - Administer ordered medications as needed  - Encourage mobilization and activity  - Consider nutritional services referral to assist patient with adequate nutrition and appropriate food choices  Outcome: Progressing  Goal: Maintains adequate nutritional intake  Description: INTERVENTIONS:  - Monitor percentage of each meal consumed  - Identify factors contributing to decreased intake, treat as appropriate  - Assist with meals as needed  - Monitor I&O, weight, and lab values if indicated  - Obtain nutrition services referral as needed  Outcome: Progressing     Problem: METABOLIC, FLUID AND ELECTROLYTES - ADULT  Goal: Electrolytes maintained within normal limits  Description: INTERVENTIONS:  - Monitor labs and assess patient for signs and symptoms of electrolyte imbalances  - Administer electrolyte replacement as ordered  - Monitor response to electrolyte replacements, including repeat lab results as appropriate  - Instruct patient on fluid and nutrition as appropriate  Outcome: Progressing  Goal: Fluid balance maintained  Description: INTERVENTIONS:  - Monitor labs   - Monitor I/O and WT  - Instruct patient on fluid and nutrition as appropriate  - Assess for signs & symptoms of volume excess or deficit  Outcome: Progressing     Problem: PAIN - ADULT  Goal: Verbalizes/displays adequate comfort level or baseline comfort level  Description: Interventions:  - Encourage patient to monitor pain and request assistance  - Assess pain using appropriate pain scale  - Administer analgesics based on type and severity of pain and evaluate response  - Implement non-pharmacological measures as appropriate and evaluate response  - Consider cultural and social influences on pain and pain management  - Notify physician/advanced practitioner if interventions unsuccessful or patient reports new pain  Outcome: Progressing     Problem: INFECTION - ADULT  Goal: Absence or prevention of progression during hospitalization  Description: INTERVENTIONS:  - Assess and monitor for signs and symptoms of infection  - Monitor lab/diagnostic results  - Monitor all insertion sites, i e  indwelling lines, tubes, and drains  - Monitor endotracheal if appropriate and nasal secretions for changes in amount and color  - [place] appropriate cooling/warming therapies per order  - Administer medications as ordered  - Instruct and encourage patient and family to use good hand hygiene technique  - Identify and instruct in appropriate isolation precautions for identified infection/condition  Outcome: Progressing     Problem: SAFETY ADULT  Goal: Maintain or return to baseline ADL function  Description: INTERVENTIONS:  -  Assess patient's ability to carry out ADLs; assess patient's baseline for ADL function and identify physical deficits which impact ability to perform ADLs (bathing, care of mouth/teeth, toileting, grooming, dressing, etc )  - Assess/evaluate cause of self-care deficits   - Assess range of motion  - Assess patient's mobility; develop plan if impaired  - Assess patient's need for assistive devices and provide as appropriate  - Encourage maximum independence but intervene and supervise when necessary  - Involve family in performance of ADLs  - Assess for home care needs following discharge   - Consider OT consult to assist with ADL evaluation and planning for discharge  - Provide patient education as appropriate  Outcome: Progressing  Goal: Maintains/Returns to pre admission functional level  Description: INTERVENTIONS:  - Perform BMAT or MOVE assessment daily    - Set and communicate daily mobility goal to care team and patient/family/caregiver  - Collaborate with rehabilitation services on mobility goals if consulted  - Perform Range of Motion 3-4 times a day  - Reposition patient every 2 hours    - Dangle patient 3 times a day  - Stand patient 3 times a day  - Ambulate patient 3 times a day  - Out of bed to chair 3 times a day   - Out of bed for meals 3 times a day  - Out of bed for toileting  - Record patient progress and toleration of activity level   Outcome: Progressing  Goal: Patient will remain free of falls  Description: INTERVENTIONS:  - Educate patient/family on patient safety including physical limitations  - Instruct patient to call for assistance with activity   - Consult OT/PT to assist with strengthening/mobility   - Keep Call bell within reach  - Keep bed low and locked with side rails adjusted as appropriate  - Keep care items and personal belongings within reach  - Initiate and maintain comfort rounds  - Make Fall Risk Sign visible to staff  - Offer Toileting every 2 Hours, in advance of need  - Initiate/Maintain bed/ chair alarm  - Apply yellow socks and bracelet for high fall risk patients  - Consider moving patient to room near nurses station  Outcome: Progressing     Problem: DISCHARGE PLANNING  Goal: Discharge to home or other facility with appropriate resources  Description: INTERVENTIONS:  - Identify barriers to discharge w/patient and caregiver  - Arrange for needed discharge resources and transportation as appropriate  - Identify discharge learning needs (meds, wound care, etc )  - Arrange for interpretive services to assist at discharge as needed  - Refer to Case Management Department for coordinating discharge planning if the patient needs post-hospital services based on physician/advanced practitioner order or complex needs related to functional status, cognitive ability, or social support system  Outcome: Progressing     Problem: Knowledge Deficit  Goal: Patient/family/caregiver demonstrates understanding of disease process, treatment plan, medications, and discharge instructions  Description: Complete learning assessment and assess knowledge base  Interventions:  - Provide teaching at level of understanding  - Provide teaching via preferred learning methods  Outcome: Progressing     Problem: Nutrition/Hydration-ADULT  Goal: Nutrient/Hydration intake appropriate for improving, restoring or maintaining nutritional needs  Description: Monitor and assess patient's nutrition/hydration status for malnutrition  Collaborate with interdisciplinary team and initiate plan and interventions as ordered  Monitor patient's weight and dietary intake as ordered or per policy  Utilize nutrition screening tool and intervene as necessary  Determine patient's food preferences and provide high-protein, high-caloric foods as appropriate       INTERVENTIONS:  - Monitor oral intake, urinary output, labs, and treatment plans  - Assess nutrition and hydration status and recommend course of action  - Evaluate amount of meals eaten  - Assist patient with eating if necessary   - Allow adequate time for meals  - Recommend/ encourage appropriate diets, oral nutritional supplements, and vitamin/mineral supplements  - Order, calculate, and assess calorie counts as needed  - Recommend, monitor, and adjust tube feedings and TPN/PPN based on assessed needs  - Assess need for intravenous fluids  - Provide specific nutrition/hydration education as appropriate  - Include patient/family/caregiver in decisions related to nutrition  Outcome: Progressing

## 2023-06-02 NOTE — PROGRESS NOTES
[facility]  Progress Note  Name: Mireya Starks  MRN: [id number]  Unit/Bed#: 585-76 I Date of Admission: 5/29/2023   Date of Service: 6/1/2023 I Hospital Day: 3    Assessment/Plan   * Decreased oral intake  Assessment & Plan  Decreased p o  intake for the past week due to persistent vomiting and diarrhea    · Symptomatic management vomiting and diarrhea as below to help moderate oral intake  · Start with surgical soft diet, advanced to regular diet  · Encourage p o  intake is much as possible  · Appears to have a 6 pound weight loss over the past month when weight is trended  · Still having difficulty with eating, maintain on IVF    Crohn's disease of colon with complication (HCC)  Assessment & Plan  · Stable, no bloody diarrhea  · Continue home budesonide 3 mg 3 times daily [nonformulary, patient does have medication with her] and prednisone 20 mg daily as needed  · On Stelara and omalizumab infusion treatment with IV hydration-next scheduled on 5/30/2023  · Patient missed all infusions this past week  · Continue Solu-Medrol --> switched to 20 mg q8hr, anticipate 24 more hours of IV steroids then GI will re-evaluate for further planning      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[facility] [address] )  Assessment & Plan  · Follows with mast cell activation specialist at the [facility]  · Continue Benadryl 50 mg IM twice daily, Protonix 40 mg twice daily, pepcid twice daily  · Watch for any signs of reaction    Seizure ([facility] [address] )  Assessment & Plan  · Ativan as needed  · Not on any other seizure medications at home  · EEG completed  Patient had one of her typical events  No epileptic activity associated with the event   Consistent with psychogenic seizures  · Recommend outpatient therapy    Hypothyroidism (acquired)  Assessment & Plan  · TSH WNL 1 889  · Continue levothyroxine 100 mcg daily    Vomiting and diarrhea  Assessment & Plan  Ongoing nonbloody nonbilious emesis and nonbloody diarrhea since EGD colonoscopy on 5/2/2023  · Reports this has been worsening over the past week  · Vomiting improved with IV Zofran-continue p o  as needed  · C  difficile ordered in ED per patient request though low to no suspicion as no prolonged hospitalization recently, no recent antibiotic use, no prior history of infection  · Received 1 5 L normal saline bolus in ED, continue normal saline 100 cc/h for maintenance  · Leukocytosis likely stress response vs viral gastroenteritis as symptoms worsening over the past week after resolution  · GI consulted as per patient GI symptoms have been ongoing since scoping on 5/2/2023  · Stool C  difficile and culture are negative  · Replace electrolytes as needed  · Vomiting has stopped but diarrhea persists, diet as tolerated             VTE Pharmacologic Prophylaxis: VTE Score: 3 Moderate Risk (Score 3-4) - Pharmacological DVT Prophylaxis Ordered: enoxaparin (Lovenox)  Patient Centered Rounds: I performed bedside rounds with nursing staff today  Discussions with Specialists or Other Care Team Provider: case management    Education and Discussions with Family / Patient: Patient declined call to   Total Time Spent on Date of Encounter in care of patient: 55 minutes This time was spent on one or more of the following: performing physical exam; counseling and coordination of care; obtaining or reviewing history; documenting in the medical record; reviewing/ordering tests, medications or procedures; communicating with other healthcare professionals and discussing with patient's family/caregivers  Current Length of Stay: 3 day(s)  Current Patient Status: Inpatient   Certification Statement: The patient will continue to require additional inpatient hospital stay due to IV steroids, continued diarrhea  Discharge Plan: Anticipate discharge in 24-48 hrs to home      Code Status: Level 1 - Full Code    Subjective:   Patient reports continued diarrhea     Objective:     Vitals:   Temp (24hrs), Av 2 °F (36 8 °C), Min:98 1 °F (36 7 °C), Max:98 3 °F (36 8 °C)    Temp:  [98 1 °F (36 7 °C)-98 3 °F (36 8 °C)] 98 3 °F (36 8 °C)  HR:  [54-62] 62  Resp:  [16-18] 16  BP: (132-141)/(82) 132/82  SpO2:  [94 %-96 %] 94 %  Body mass index is 24 21 kg/m²  Input and Output Summary (last 24 hours): Intake/Output Summary (Last 24 hours) at 2023  Last data filed at 2023  Gross per 24 hour   Intake 1180 ml   Output --   Net 1180 ml       Physical Exam:   Physical Exam  Vitals and nursing note reviewed  Constitutional:       General: She is not in acute distress  Appearance: She is not ill-appearing  HENT:      Head: Normocephalic and atraumatic  Cardiovascular:      Rate and Rhythm: Normal rate and regular rhythm  Pulses: Normal pulses  Heart sounds: Normal heart sounds  Pulmonary:      Effort: Pulmonary effort is normal       Breath sounds: Normal breath sounds  Abdominal:      Tenderness: There is abdominal tenderness  There is no guarding or rebound  Musculoskeletal:      Right lower leg: No edema  Left lower leg: No edema  Skin:     General: Skin is warm and dry  Neurological:      General: No focal deficit present  Mental Status: She is alert  Mental status is at baseline     Psychiatric:         Mood and Affect: Mood normal          Behavior: Behavior normal           Additional Data:     Labs:  Results from last 7 days   Lab Units 23  0526   EOS PCT % 0   HEMATOCRIT % 37 1   HEMOGLOBIN g/dL 12 3   LYMPHS PCT % 12*   MONOS PCT % 8   NEUTROS PCT % 79*   PLATELETS Thousands/uL 431*   WBC Thousand/uL 11 60*     Results from last 7 days   Lab Units 23  0526 23  0457 23  0504   ANION GAP mmol/L 7   < > 7   ALBUMIN g/dL  --   --  3 4*   ALK PHOS U/L  --   --  49   ALT U/L  --   --  9   AST U/L  --   --  8*   BUN mg/dL 10   < > 11   CALCIUM mg/dL 8 0*   < > 8 2*   CHLORIDE mmol/L 106   < > 108   CO2 mmol/L 27   < > 25   CREATININE mg/dL 0 54*   < > 0 90   GLUCOSE RANDOM mg/dL 128   < > 88   POTASSIUM mmol/L 3 5   < > 3 6   SODIUM mmol/L 140   < > 140   TOTAL BILIRUBIN mg/dL  --   --  0 45    < > = values in this interval not displayed  Results from last 7 days   Lab Units 05/30/23  0504   PROCALCITONIN ng/ml <0 05       Lines/Drains:  Invasive Devices     Peripheral Intravenous Line  Duration           Peripheral IV 05/29/23 Ventral (anterior); Left Forearm 2 days                      Imaging: No pertinent imaging reviewed      Recent Cultures (last 7 days):   Results from last 7 days   Lab Units 05/29/23  2234   C DIFF TOXIN B BY PCR  Negative       Last 24 Hours Medication List:   Current Facility-Administered Medications   Medication Dose Route Frequency Provider Last Rate   • acetaminophen  650 mg Oral Q6H PRN Melita Angelia, DO     • albuterol  2 puff Inhalation Q4H PRN Betzy Calista, DO     • budesonide  3 mg Oral TID Melita Angelia, DO     • butalbital-acetaminophen-caffeine  1 tablet Oral Q8H PRN Melita Angelia, DO     • cholecalciferol  1,000 Units Oral BID Melita Angelia, DO     • diphenhydrAMINE  50 mg Intramuscular Q6H Holly Moody MD     • diphenhydrAMINE  50 mg Intravenous Q6H PRN Jack Chen PA-C     • enoxaparin  40 mg Subcutaneous Daily Betzy Grigsby DO     • famotidine  20 mg Intravenous Q12H Ana Smith PA-C     • HYDROmorphone  1 mg Intravenous Q4H PRN Jack Chen PA-C     • levothyroxine  100 mcg Oral Early Morning Betzy Grigsby DO     • LORazepam  1 mg Intravenous Q4H PRN David Tripp, DO     • methylPREDNISolone sodium succinate  20 mg Intravenous [facility] JONATHAN Toribio     • ondansetron  4 mg Intravenous Q6H PRN Holly Moody MD     • pantoprazole  40 mg Intravenous Q24H [address] Jack Chen PA-C     • potassium-sodium phosphateS  1 tablet Oral TID With Meals Holly Moody MD     • predniSONE  20 mg Oral Daily PRN Melitamichael Galan, DO     • sodium chloride  100 mL/hr Intravenous Continuous Harleenee Jose Antonio,  mL/hr (06/01/23 2124)        Today, Patient Was Seen By: Litzy Lal PA-C    **Please Note: This note may have been constructed using a voice recognition system  **

## 2023-06-02 NOTE — PLAN OF CARE
Problem: MOBILITY - ADULT  Goal: Maintain or return to baseline ADL function  Description: INTERVENTIONS:  -  Assess patient's ability to carry out ADLs; assess patient's baseline for ADL function and identify physical deficits which impact ability to perform ADLs (bathing, care of mouth/teeth, toileting, grooming, dressing, etc )  - Assess/evaluate cause of self-care deficits   - Assess range of motion  - Assess patient's mobility; develop plan if impaired  - Assess patient's need for assistive devices and provide as appropriate  - Encourage maximum independence but intervene and supervise when necessary  - Involve family in performance of ADLs  - Assess for home care needs following discharge   - Consider OT consult to assist with ADL evaluation and planning for discharge  - Provide patient education as appropriate  Outcome: Progressing  Goal: Maintains/Returns to pre admission functional level  Description: INTERVENTIONS:  - Perform BMAT or MOVE assessment daily    - Set and communicate daily mobility goal to care team and patient/family/caregiver  - Collaborate with rehabilitation services on mobility goals if consulted  - Perform Range of Motion 3-4 times a day  - Reposition patient every 2 hours    - Dangle patient 3 times a day  - Stand patient 3 times a day  - Ambulate patient 3 times a day  - Out of bed to chair 3 times a day   - Out of bed for meals 3 times a day  - Out of bed for toileting  - Record patient progress and toleration of activity level   Outcome: Progressing     Problem: GASTROINTESTINAL - ADULT  Goal: Minimal or absence of nausea and/or vomiting  Description: INTERVENTIONS:  - Administer IV fluids if ordered to ensure adequate hydration  - Maintain NPO status until nausea and vomiting are resolved  - Nasogastric tube if ordered  - Administer ordered antiemetic medications as needed  - Provide nonpharmacologic comfort measures as appropriate  - Advance diet as tolerated, if ordered  - Consider nutrition services referral to assist patient with adequate nutrition and appropriate food choices  Outcome: Progressing  Goal: Maintains or returns to baseline bowel function  Description: INTERVENTIONS:  - Assess bowel function  - Encourage oral fluids to ensure adequate hydration  - Administer IV fluids if ordered to ensure adequate hydration  - Administer ordered medications as needed  - Encourage mobilization and activity  - Consider nutritional services referral to assist patient with adequate nutrition and appropriate food choices  Outcome: Progressing  Goal: Maintains adequate nutritional intake  Description: INTERVENTIONS:  - Monitor percentage of each meal consumed  - Identify factors contributing to decreased intake, treat as appropriate  - Assist with meals as needed  - Monitor I&O, weight, and lab values if indicated  - Obtain nutrition services referral as needed  Outcome: Progressing     Problem: METABOLIC, FLUID AND ELECTROLYTES - ADULT  Goal: Electrolytes maintained within normal limits  Description: INTERVENTIONS:  - Monitor labs and assess patient for signs and symptoms of electrolyte imbalances  - Administer electrolyte replacement as ordered  - Monitor response to electrolyte replacements, including repeat lab results as appropriate  - Instruct patient on fluid and nutrition as appropriate  Outcome: Progressing  Goal: Fluid balance maintained  Description: INTERVENTIONS:  - Monitor labs   - Monitor I/O and WT  - Instruct patient on fluid and nutrition as appropriate  - Assess for signs & symptoms of volume excess or deficit  Outcome: Progressing     Problem: PAIN - ADULT  Goal: Verbalizes/displays adequate comfort level or baseline comfort level  Description: Interventions:  - Encourage patient to monitor pain and request assistance  - Assess pain using appropriate pain scale  - Administer analgesics based on type and severity of pain and evaluate response  - Implement non-pharmacological measures as appropriate and evaluate response  - Consider cultural and social influences on pain and pain management  - Notify physician/advanced practitioner if interventions unsuccessful or patient reports new pain  Outcome: Progressing     Problem: INFECTION - ADULT  Goal: Absence or prevention of progression during hospitalization  Description: INTERVENTIONS:  - Assess and monitor for signs and symptoms of infection  - Monitor lab/diagnostic results  - Monitor all insertion sites, i e  indwelling lines, tubes, and drains  - Monitor endotracheal if appropriate and nasal secretions for changes in amount and color  - [place] appropriate cooling/warming therapies per order  - Administer medications as ordered  - Instruct and encourage patient and family to use good hand hygiene technique  - Identify and instruct in appropriate isolation precautions for identified infection/condition  Outcome: Progressing     Problem: SAFETY ADULT  Goal: Maintain or return to baseline ADL function  Description: INTERVENTIONS:  -  Assess patient's ability to carry out ADLs; assess patient's baseline for ADL function and identify physical deficits which impact ability to perform ADLs (bathing, care of mouth/teeth, toileting, grooming, dressing, etc )  - Assess/evaluate cause of self-care deficits   - Assess range of motion  - Assess patient's mobility; develop plan if impaired  - Assess patient's need for assistive devices and provide as appropriate  - Encourage maximum independence but intervene and supervise when necessary  - Involve family in performance of ADLs  - Assess for home care needs following discharge   - Consider OT consult to assist with ADL evaluation and planning for discharge  - Provide patient education as appropriate  Outcome: Progressing  Goal: Maintains/Returns to pre admission functional level  Description: INTERVENTIONS:  - Perform BMAT or MOVE assessment daily    - Set and communicate daily mobility goal to care team and patient/family/caregiver  - Collaborate with rehabilitation services on mobility goals if consulted  - Perform Range of Motion 3-4 times a day  - Reposition patient every 2 hours    - Dangle patient 3 times a day  - Stand patient 3 times a day  - Ambulate patient 3 times a day  - Out of bed to chair 3 times a day   - Out of bed for meals 3 times a day  - Out of bed for toileting  - Record patient progress and toleration of activity level   Outcome: Progressing  Goal: Patient will remain free of falls  Description: INTERVENTIONS:  - Educate patient/family on patient safety including physical limitations  - Instruct patient to call for assistance with activity   - Consult OT/PT to assist with strengthening/mobility   - Keep Call bell within reach  - Keep bed low and locked with side rails adjusted as appropriate  - Keep care items and personal belongings within reach  - Initiate and maintain comfort rounds  - Make Fall Risk Sign visible to staff  - Offer Toileting every 2 Hours, in advance of need  - Initiate/Maintain bed/ chair alarm  - Apply yellow socks and bracelet for high fall risk patients  - Consider moving patient to room near nurses station  Outcome: Progressing     Problem: DISCHARGE PLANNING  Goal: Discharge to home or other facility with appropriate resources  Description: INTERVENTIONS:  - Identify barriers to discharge w/patient and caregiver  - Arrange for needed discharge resources and transportation as appropriate  - Identify discharge learning needs (meds, wound care, etc )  - Arrange for interpretive services to assist at discharge as needed  - Refer to Case Management Department for coordinating discharge planning if the patient needs post-hospital services based on physician/advanced practitioner order or complex needs related to functional status, cognitive ability, or social support system  Outcome: Progressing     Problem: Knowledge Deficit  Goal: Patient/family/caregiver demonstrates understanding of disease process, treatment plan, medications, and discharge instructions  Description: Complete learning assessment and assess knowledge base  Interventions:  - Provide teaching at level of understanding  - Provide teaching via preferred learning methods  Outcome: Progressing     Problem: Nutrition/Hydration-ADULT  Goal: Nutrient/Hydration intake appropriate for improving, restoring or maintaining nutritional needs  Description: Monitor and assess patient's nutrition/hydration status for malnutrition  Collaborate with interdisciplinary team and initiate plan and interventions as ordered  Monitor patient's weight and dietary intake as ordered or per policy  Utilize nutrition screening tool and intervene as necessary  Determine patient's food preferences and provide high-protein, high-caloric foods as appropriate       INTERVENTIONS:  - Monitor oral intake, urinary output, labs, and treatment plans  - Assess nutrition and hydration status and recommend course of action  - Evaluate amount of meals eaten  - Assist patient with eating if necessary   - Allow adequate time for meals  - Recommend/ encourage appropriate diets, oral nutritional supplements, and vitamin/mineral supplements  - Order, calculate, and assess calorie counts as needed  - Recommend, monitor, and adjust tube feedings and TPN/PPN based on assessed needs  - Assess need for intravenous fluids  - Provide specific nutrition/hydration education as appropriate  - Include patient/family/caregiver in decisions related to nutrition  Outcome: Progressing

## 2023-06-03 PROBLEM — R31.9 HEMATURIA: Status: ACTIVE | Noted: 2023-06-03

## 2023-06-03 LAB
ALBUMIN SERPL BCP-MCNC: 3.5 G/DL (ref 3.5–5)
ALP SERPL-CCNC: 61 U/L (ref 34–104)
ALT SERPL W P-5'-P-CCNC: 40 U/L (ref 7–52)
ANION GAP SERPL CALCULATED.3IONS-SCNC: 8 MMOL/L (ref 4–13)
AST SERPL W P-5'-P-CCNC: 25 U/L (ref 13–39)
BASOPHILS # BLD AUTO: 0.06 THOUSANDS/ÂΜL (ref 0–0.1)
BASOPHILS NFR BLD AUTO: 0 % (ref 0–1)
BILIRUB SERPL-MCNC: 0.23 MG/DL (ref 0.2–1)
BUN SERPL-MCNC: 13 MG/DL (ref 5–25)
CALCIUM SERPL-MCNC: 8.6 MG/DL (ref 8.4–10.2)
CHLORIDE SERPL-SCNC: 103 MMOL/L (ref 96–108)
CO2 SERPL-SCNC: 28 MMOL/L (ref 21–32)
CREAT SERPL-MCNC: 0.61 MG/DL (ref 0.6–1.3)
EOSINOPHIL # BLD AUTO: 0 THOUSAND/ÂΜL (ref 0–0.61)
EOSINOPHIL NFR BLD AUTO: 0 % (ref 0–6)
ERYTHROCYTE [DISTWIDTH] IN BLOOD BY AUTOMATED COUNT: 13.9 % (ref 11.6–15.1)
GFR SERPL CREATININE-BSD FRML MDRD: 106 ML/MIN/1.73SQ M
GLUCOSE SERPL-MCNC: 127 MG/DL (ref 65–140)
HCT VFR BLD AUTO: 39.6 % (ref 34.8–46.1)
HGB BLD-MCNC: 13.2 G/DL (ref 11.5–15.4)
IMM GRANULOCYTES # BLD AUTO: 0.38 THOUSAND/UL (ref 0–0.2)
IMM GRANULOCYTES NFR BLD AUTO: 2 % (ref 0–2)
LYMPHOCYTES # BLD AUTO: 2.13 THOUSANDS/ÂΜL (ref 0.6–4.47)
LYMPHOCYTES NFR BLD AUTO: 13 % (ref 14–44)
MAGNESIUM SERPL-MCNC: 1.7 MG/DL (ref 1.9–2.7)
MCH RBC QN AUTO: 30.6 PG (ref 26.8–34.3)
MCHC RBC AUTO-ENTMCNC: 33.3 G/DL (ref 31.4–37.4)
MCV RBC AUTO: 92 FL (ref 82–98)
MONOCYTES # BLD AUTO: 1.01 THOUSAND/ÂΜL (ref 0.17–1.22)
MONOCYTES NFR BLD AUTO: 6 % (ref 4–12)
NEUTROPHILS # BLD AUTO: 12.48 THOUSANDS/ÂΜL (ref 1.85–7.62)
NEUTS SEG NFR BLD AUTO: 79 % (ref 43–75)
NRBC BLD AUTO-RTO: 1 /100 WBCS
PLATELET # BLD AUTO: 502 THOUSANDS/UL (ref 149–390)
PMV BLD AUTO: 9.2 FL (ref 8.9–12.7)
POTASSIUM SERPL-SCNC: 3.6 MMOL/L (ref 3.5–5.3)
PROT SERPL-MCNC: 6.1 G/DL (ref 6.4–8.4)
RBC # BLD AUTO: 4.32 MILLION/UL (ref 3.81–5.12)
SODIUM SERPL-SCNC: 139 MMOL/L (ref 135–147)
WBC # BLD AUTO: 16.06 THOUSAND/UL (ref 4.31–10.16)

## 2023-06-03 PROCEDURE — 99233 SBSQ HOSP IP/OBS HIGH 50: CPT | Performed by: PHYSICIAN ASSISTANT

## 2023-06-03 PROCEDURE — 83735 ASSAY OF MAGNESIUM: CPT | Performed by: FAMILY MEDICINE

## 2023-06-03 PROCEDURE — 85025 COMPLETE CBC W/AUTO DIFF WBC: CPT | Performed by: FAMILY MEDICINE

## 2023-06-03 PROCEDURE — 80053 COMPREHEN METABOLIC PANEL: CPT | Performed by: FAMILY MEDICINE

## 2023-06-03 PROCEDURE — C9113 INJ PANTOPRAZOLE SODIUM, VIA: HCPCS | Performed by: PHYSICIAN ASSISTANT

## 2023-06-03 RX ORDER — MAGNESIUM SULFATE 1 G/100ML
1 INJECTION INTRAVENOUS ONCE
Status: COMPLETED | OUTPATIENT
Start: 2023-06-03 | End: 2023-06-03

## 2023-06-03 RX ORDER — METHYLPREDNISOLONE SODIUM SUCCINATE 40 MG/ML
20 INJECTION, POWDER, LYOPHILIZED, FOR SOLUTION INTRAMUSCULAR; INTRAVENOUS EVERY 8 HOURS SCHEDULED
Status: DISCONTINUED | OUTPATIENT
Start: 2023-06-03 | End: 2023-06-09

## 2023-06-03 RX ORDER — SODIUM CHLORIDE 9 MG/ML
75 INJECTION, SOLUTION INTRAVENOUS CONTINUOUS
Status: DISCONTINUED | OUTPATIENT
Start: 2023-06-03 | End: 2023-06-13 | Stop reason: HOSPADM

## 2023-06-03 RX ADMIN — LORAZEPAM 1 MG: 2 INJECTION INTRAMUSCULAR; INTRAVENOUS at 07:51

## 2023-06-03 RX ADMIN — DIPHENHYDRAMINE HYDROCHLORIDE 50 MG: 50 INJECTION, SOLUTION INTRAMUSCULAR; INTRAVENOUS at 23:13

## 2023-06-03 RX ADMIN — METHYLPREDNISOLONE SODIUM SUCCINATE 20 MG: 40 INJECTION, POWDER, FOR SOLUTION INTRAMUSCULAR; INTRAVENOUS at 21:09

## 2023-06-03 RX ADMIN — LORAZEPAM 1 MG: 2 INJECTION INTRAMUSCULAR; INTRAVENOUS at 00:46

## 2023-06-03 RX ADMIN — ONDANSETRON 4 MG: 2 INJECTION INTRAMUSCULAR; INTRAVENOUS at 01:41

## 2023-06-03 RX ADMIN — PANTOPRAZOLE SODIUM 40 MG: 40 INJECTION, POWDER, FOR SOLUTION INTRAVENOUS at 08:05

## 2023-06-03 RX ADMIN — DIPHENHYDRAMINE HYDROCHLORIDE 50 MG: 50 INJECTION, SOLUTION INTRAMUSCULAR; INTRAVENOUS at 11:15

## 2023-06-03 RX ADMIN — MAGNESIUM SULFATE HEPTAHYDRATE 1 G: 1 INJECTION, SOLUTION INTRAVENOUS at 10:32

## 2023-06-03 RX ADMIN — FAMOTIDINE 20 MG: 10 INJECTION, SOLUTION INTRAVENOUS at 08:05

## 2023-06-03 RX ADMIN — DIPHENHYDRAMINE HYDROCHLORIDE 50 MG: 50 INJECTION, SOLUTION INTRAMUSCULAR; INTRAVENOUS at 05:15

## 2023-06-03 RX ADMIN — DIPHENHYDRAMINE HYDROCHLORIDE 50 MG: 50 INJECTION, SOLUTION INTRAMUSCULAR; INTRAVENOUS at 17:31

## 2023-06-03 RX ADMIN — LORAZEPAM 1 MG: 2 INJECTION INTRAMUSCULAR; INTRAVENOUS at 13:58

## 2023-06-03 RX ADMIN — FAMOTIDINE 20 MG: 10 INJECTION, SOLUTION INTRAVENOUS at 21:08

## 2023-06-03 RX ADMIN — HYDROMORPHONE HYDROCHLORIDE 1 MG: 1 INJECTION, SOLUTION INTRAMUSCULAR; INTRAVENOUS; SUBCUTANEOUS at 05:13

## 2023-06-03 RX ADMIN — SODIUM CHLORIDE 75 ML/HR: 0.9 INJECTION, SOLUTION INTRAVENOUS at 10:31

## 2023-06-03 RX ADMIN — ONDANSETRON 4 MG: 2 INJECTION INTRAMUSCULAR; INTRAVENOUS at 20:22

## 2023-06-03 RX ADMIN — ONDANSETRON 4 MG: 2 INJECTION INTRAMUSCULAR; INTRAVENOUS at 13:58

## 2023-06-03 RX ADMIN — HYDROMORPHONE HYDROCHLORIDE 1 MG: 1 INJECTION, SOLUTION INTRAMUSCULAR; INTRAVENOUS; SUBCUTANEOUS at 23:14

## 2023-06-03 RX ADMIN — HYDROMORPHONE HYDROCHLORIDE 1 MG: 1 INJECTION, SOLUTION INTRAMUSCULAR; INTRAVENOUS; SUBCUTANEOUS at 11:15

## 2023-06-03 RX ADMIN — LORAZEPAM 1 MG: 2 INJECTION INTRAMUSCULAR; INTRAVENOUS at 20:22

## 2023-06-03 RX ADMIN — METHYLPREDNISOLONE SODIUM SUCCINATE 20 MG: 40 INJECTION, POWDER, FOR SOLUTION INTRAMUSCULAR; INTRAVENOUS at 13:58

## 2023-06-03 RX ADMIN — ONDANSETRON 4 MG: 2 INJECTION INTRAMUSCULAR; INTRAVENOUS at 07:53

## 2023-06-03 RX ADMIN — HYDROMORPHONE HYDROCHLORIDE 1 MG: 1 INJECTION, SOLUTION INTRAMUSCULAR; INTRAVENOUS; SUBCUTANEOUS at 17:31

## 2023-06-03 NOTE — PROGRESS NOTES
[address] [facility] 160[facility]  Progress Note  Name: Alexandria Lorenzana  MRN: [id number]  Unit/Bed#: 334-60 I Date of Admission: 5/29/2023   Date of Service: 6/3/2023 I Hospital Day: 5    Assessment/Plan   Hematuria  Assessment & Plan  Patient reports bright red blood in urine overnight  Per patient has cystoscopy scheduled Tuesday with Saida Orosco urology  Reports difficulty fully emptying bladder and was sitting on the toilet for long periods of time overnight  Getting bladder scans, retention protocol ordered    Seizure [facility])  Assessment & Plan  · Ativan as needed  · Not on any other seizure medications at home  · EEG completed  Patient had one of her typical events  No epileptic activity associated with the event   Consistent with psychogenic seizures  · Recommend outpatient therapy    Decreased oral intake  Assessment & Plan  Decreased p o  intake for the past week due to persistent vomiting and diarrhea    · Patient is not tolerating diet, all her meals are brought from home by her  due to her reported corn allergy  · Continue IV fluids    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  · Continue Benadryl 50 mg IM twice daily, Protonix 40 mg twice daily, pepcid twice daily  · Monitor symptoms    Hypothyroidism (acquired)  Assessment & Plan  · TSH WNL 1 889  · Continue levothyroxine 100 mcg daily    SIRS (systemic inflammatory response syndrome) (HCC)  Assessment & Plan  SIRS, resolved, possibly due to Crohn's disease/Viral gastroenteritis; presented with WBC 13 25 and   · Stool cultures negative  · treated with IVF, IV Medrol, IV Protonix,   · GI input appreciated  · Monitor VS, CBC  · WBC today 16 06, likely elevated due to IV steroids            Vomiting and diarrhea  Assessment & Plan  Ongoing nonbloody nonbilious emesis and nonbloody diarrhea since EGD colonoscopy on 5/2/2023  · Reported this had been worsening over the past "week  · Vomiting improved with IV Zofran-continue p o  as needed  Diarrhea appears resolved  · Stool C  difficile and PCR negative  · Received 1 5 L normal saline bolus in ED, continued normal saline 100 cc/h for maintenance - discontinued last night  Patient concerned because she feels dehydrated and states she has \"poor IV access\"  Will resume IV fluids at 75cc/hr for now  · Met SIRS criteria on admission likely due to Crohn's vs viral gastroenteritis and resovled  · Replace electrolytes as needed      * Crohn's disease of colon with complication (HCC)  Assessment & Plan  · Stable, no bloody diarrhea  · On Stelara and omalizumab infusion treatment with IV hydration-next scheduled on 5/30/2023  · Patient missed all infusions this past week  · She reports taking budesonide 3 mg 3 times daily at home  She brought her home medication which she has been taking and is now discontinued  · Was started on IV steroids yesterday  Per GI, plan was to transition to PO steroids today, she did not receive PO dose yet as she needs to take her own home supple due to corn allergy and her  has not brought it yet  Patient complaining of increased abdominal pain overnight  Currently lying in bed with ice packs on abdomen and head  VTE Pharmacologic Prophylaxis:   Pharmacologic: Pharmacologic VTE Prophylaxis contraindicated due to hematuria  Mechanical VTE Prophylaxis in Place: No    Patient Centered Rounds: I have performed bedside rounds with nursing staff today  Education and Discussions with Family / Patient: reviewed treatment plan with patient    Time Spent for Care: 45 minutes  More than 50% of total time spent on counseling and coordination of care as described above      Current Length of Stay: 5 day(s)    Current Patient Status: Inpatient   Certification Statement: The patient will continue to require additional inpatient hospital stay due to continue IV fluids and steroids, await urology " "consult    Discharge Plan: home when ready    Code Status: Level 1 - Full Code      Subjective:   46year old female admitted for vomiting and diarrhea  Seen by GI  Was supposed to transition to PO steroids today  Patient states she \"had a really bad night\"  Reports being in the bathroom all night due to increased abdominal pain  Feels like she is unable to fully empty her bladder  On urinary retention protocol  Reports episode of bright red blood in urine overnight  States she feels she is \"getting dehydrated\" and in concerned because she has \"bad IV access\"  Objective:     Vitals:   Temp (24hrs), Av 3 °F (36 8 °C), Min:97 9 °F (36 6 °C), Max:98 6 °F (37 °C)    Temp:  [97 9 °F (36 6 °C)-98 6 °F (37 °C)] 98 3 °F (36 8 °C)  HR:  [72-85] 85  Resp:  [18] 18  BP: (126-145)/(78-96) 138/87  SpO2:  [94 %-97 %] 97 %  Body mass index is 25 02 kg/m²  Input and Output Summary (last 24 hours): Intake/Output Summary (Last 24 hours) at 6/3/2023 1013  Last data filed at 6/3/2023 0443  Gross per 24 hour   Intake 1253 33 ml   Output --   Net 1253 33 ml       Physical Exam:     Physical Exam  Vitals reviewed  Constitutional:       General: She is not in acute distress  Appearance: She is not diaphoretic  HENT:      Head: Normocephalic and atraumatic  Nose: Nose normal       Mouth/Throat:      Pharynx: Oropharynx is clear  Eyes:      Conjunctiva/sclera: Conjunctivae normal    Cardiovascular:      Rate and Rhythm: Normal rate  Pulmonary:      Effort: Pulmonary effort is normal  No respiratory distress  Abdominal:      General: Bowel sounds are normal  There is no distension  Palpations: Abdomen is soft  Comments: Generalized tenderness   Musculoskeletal:         General: No deformity or signs of injury  Skin:     General: Skin is warm and dry  Findings: No bruising or erythema  Neurological:      Mental Status: She is alert and oriented to person, place, and time   " Additional Data:     Labs:    Results from last 7 days   Lab Units 06/03/23  0451   EOS PCT % 0   HEMATOCRIT % 39 6   HEMOGLOBIN g/dL 13 2   LYMPHS PCT % 13*   MONOS PCT % 6   NEUTROS PCT % 79*   PLATELETS Thousands/uL 502*   WBC Thousand/uL 16 06*     Results from last 7 days   Lab Units 06/03/23  0451   ANION GAP mmol/L 8   ALBUMIN g/dL 3 5   ALK PHOS U/L 61   ALT U/L 40   AST U/L 25   BUN mg/dL 13   CALCIUM mg/dL 8 6   CHLORIDE mmol/L 103   CO2 mmol/L 28   CREATININE mg/dL 0 61   GLUCOSE RANDOM mg/dL 127   POTASSIUM mmol/L 3 6   SODIUM mmol/L 139   TOTAL BILIRUBIN mg/dL 0 23                 Results from last 7 days   Lab Units 05/30/23  0504   PROCALCITONIN ng/ml <0 05           * I Have Reviewed All Lab Data Listed Above  * Additional Pertinent Lab Tests Reviewed:  [address] Admission Reviewed      Recent Cultures (last 7 days):     Results from last 7 days   Lab Units 05/29/23  2234   C DIFF TOXIN B BY PCR  Negative       Last 24 Hours Medication List:   Current Facility-Administered Medications   Medication Dose Route Frequency Provider Last Rate   • acetaminophen  650 mg Oral Q6H PRN Aliyah Hams, DO     • albuterol  2 puff Inhalation Q4H PRN Aliyah Hams, DO     • butalbital-acetaminophen-caffeine  1 tablet Oral Q8H PRN Aliyah Hams, DO     • cholecalciferol  1,000 Units Oral BID Aliyah Hams, DO     • diphenhydrAMINE  50 mg Intramuscular Q6H Sallyann Dance, MD     • diphenhydrAMINE  50 mg Intravenous Q6H PRN Ag Corrales PA-C     • famotidine  20 mg Intravenous Q12H [address] Ag Corrales PA-C     • HYDROmorphone  1 mg Intravenous Q4H PRN Ag Corrales PA-C     • levothyroxine  100 mcg Oral Early Morning Betzy Grigsby DO     • LORazepam  1 mg Intravenous Q4H PRN Cynthia Tripp DO     • magnesium sulfate  1 g Intravenous Once Vishnu Nichole PA-C     • methylPREDNISolone sodium succinate  20 mg Intravenous [facility] Vishnu Nichole PA-C     • ondansetron  4 mg Intravenous Q6H PRN Baldemar Quiles MD     • pantoprazole  40 mg Intravenous Q24H [address] Bernida Aschoff, PA-C     • potassium-sodium phosphateS  1 tablet Oral TID With Meals Baldemar Quiles MD     • predniSONE  20 mg Oral Daily PRN Joanne Srinivasan DO     • sodium chloride  75 mL/hr Intravenous Continuous Maggie Anderson PA-C          Today, Patient Was Seen By: Leander Geiger PA-C    ** Please Note: Dictation voice to text software may have been used in the creation of this document   **

## 2023-06-03 NOTE — PLAN OF CARE
Problem: MOBILITY - ADULT  Goal: Maintain or return to baseline ADL function  Description: INTERVENTIONS:  -  Assess patient's ability to carry out ADLs; assess patient's baseline for ADL function and identify physical deficits which impact ability to perform ADLs (bathing, care of mouth/teeth, toileting, grooming, dressing, etc )  - Assess/evaluate cause of self-care deficits   - Assess range of motion  - Assess patient's mobility; develop plan if impaired  - Assess patient's need for assistive devices and provide as appropriate  - Encourage maximum independence but intervene and supervise when necessary  - Involve family in performance of ADLs  - Assess for home care needs following discharge   - Consider OT consult to assist with ADL evaluation and planning for discharge  - Provide patient education as appropriate  Outcome: Progressing  Goal: Maintains/Returns to pre admission functional level  Description: INTERVENTIONS:  - Perform BMAT or MOVE assessment daily    - Set and communicate daily mobility goal to care team and patient/family/caregiver  - Collaborate with rehabilitation services on mobility goals if consulted  - Perform Range of Motion 3-4 times a day  - Reposition patient every 2 hours    - Dangle patient 3 times a day  - Stand patient 3 times a day  - Ambulate patient 3 times a day  - Out of bed to chair 3 times a day   - Out of bed for meals 3 times a day  - Out of bed for toileting  - Record patient progress and toleration of activity level   Outcome: Progressing     Problem: GASTROINTESTINAL - ADULT  Goal: Minimal or absence of nausea and/or vomiting  Description: INTERVENTIONS:  - Administer IV fluids if ordered to ensure adequate hydration  - Maintain NPO status until nausea and vomiting are resolved  - Nasogastric tube if ordered  - Administer ordered antiemetic medications as needed  - Provide nonpharmacologic comfort measures as appropriate  - Advance diet as tolerated, if ordered  - Consider nutrition services referral to assist patient with adequate nutrition and appropriate food choices  Outcome: Progressing  Goal: Maintains or returns to baseline bowel function  Description: INTERVENTIONS:  - Assess bowel function  - Encourage oral fluids to ensure adequate hydration  - Administer IV fluids if ordered to ensure adequate hydration  - Administer ordered medications as needed  - Encourage mobilization and activity  - Consider nutritional services referral to assist patient with adequate nutrition and appropriate food choices  Outcome: Progressing  Goal: Maintains adequate nutritional intake  Description: INTERVENTIONS:  - Monitor percentage of each meal consumed  - Identify factors contributing to decreased intake, treat as appropriate  - Assist with meals as needed  - Monitor I&O, weight, and lab values if indicated  - Obtain nutrition services referral as needed  Outcome: Progressing     Problem: METABOLIC, FLUID AND ELECTROLYTES - ADULT  Goal: Electrolytes maintained within normal limits  Description: INTERVENTIONS:  - Monitor labs and assess patient for signs and symptoms of electrolyte imbalances  - Administer electrolyte replacement as ordered  - Monitor response to electrolyte replacements, including repeat lab results as appropriate  - Instruct patient on fluid and nutrition as appropriate  Outcome: Progressing  Goal: Fluid balance maintained  Description: INTERVENTIONS:  - Monitor labs   - Monitor I/O and WT  - Instruct patient on fluid and nutrition as appropriate  - Assess for signs & symptoms of volume excess or deficit  Outcome: Progressing     Problem: PAIN - ADULT  Goal: Verbalizes/displays adequate comfort level or baseline comfort level  Description: Interventions:  - Encourage patient to monitor pain and request assistance  - Assess pain using appropriate pain scale  - Administer analgesics based on type and severity of pain and evaluate response  - Implement non-pharmacological measures as appropriate and evaluate response  - Consider cultural and social influences on pain and pain management  - Notify physician/advanced practitioner if interventions unsuccessful or patient reports new pain  Outcome: Progressing     Problem: INFECTION - ADULT  Goal: Absence or prevention of progression during hospitalization  Description: INTERVENTIONS:  - Assess and monitor for signs and symptoms of infection  - Monitor lab/diagnostic results  - Monitor all insertion sites, i e  indwelling lines, tubes, and drains  - Monitor endotracheal if appropriate and nasal secretions for changes in amount and color  - [place] appropriate cooling/warming therapies per order  - Administer medications as ordered  - Instruct and encourage patient and family to use good hand hygiene technique  - Identify and instruct in appropriate isolation precautions for identified infection/condition  Outcome: Progressing     Problem: SAFETY ADULT  Goal: Maintain or return to baseline ADL function  Description: INTERVENTIONS:  -  Assess patient's ability to carry out ADLs; assess patient's baseline for ADL function and identify physical deficits which impact ability to perform ADLs (bathing, care of mouth/teeth, toileting, grooming, dressing, etc )  - Assess/evaluate cause of self-care deficits   - Assess range of motion  - Assess patient's mobility; develop plan if impaired  - Assess patient's need for assistive devices and provide as appropriate  - Encourage maximum independence but intervene and supervise when necessary  - Involve family in performance of ADLs  - Assess for home care needs following discharge   - Consider OT consult to assist with ADL evaluation and planning for discharge  - Provide patient education as appropriate  Outcome: Progressing  Goal: Maintains/Returns to pre admission functional level  Description: INTERVENTIONS:  - Perform BMAT or MOVE assessment daily    - Set and communicate daily mobility goal to care team and patient/family/caregiver  - Collaborate with rehabilitation services on mobility goals if consulted  - Perform Range of Motion 3-4 times a day  - Reposition patient every 2 hours    - Dangle patient 3 times a day  - Stand patient 3 times a day  - Ambulate patient 3 times a day  - Out of bed to chair 3 times a day   - Out of bed for meals 3 times a day  - Out of bed for toileting  - Record patient progress and toleration of activity level   Outcome: Progressing  Goal: Patient will remain free of falls  Description: INTERVENTIONS:  - Educate patient/family on patient safety including physical limitations  - Instruct patient to call for assistance with activity   - Consult OT/PT to assist with strengthening/mobility   - Keep Call bell within reach  - Keep bed low and locked with side rails adjusted as appropriate  - Keep care items and personal belongings within reach  - Initiate and maintain comfort rounds  - Make Fall Risk Sign visible to staff  - Offer Toileting every 2 Hours, in advance of need  - Initiate/Maintain bed/ chair alarm  - Apply yellow socks and bracelet for high fall risk patients  - Consider moving patient to room near nurses station  Outcome: Progressing     Problem: DISCHARGE PLANNING  Goal: Discharge to home or other facility with appropriate resources  Description: INTERVENTIONS:  - Identify barriers to discharge w/patient and caregiver  - Arrange for needed discharge resources and transportation as appropriate  - Identify discharge learning needs (meds, wound care, etc )  - Arrange for interpretive services to assist at discharge as needed  - Refer to Case Management Department for coordinating discharge planning if the patient needs post-hospital services based on physician/advanced practitioner order or complex needs related to functional status, cognitive ability, or social support system  Outcome: Progressing     Problem: Knowledge Deficit  Goal: Patient/family/caregiver demonstrates understanding of disease process, treatment plan, medications, and discharge instructions  Description: Complete learning assessment and assess knowledge base  Interventions:  - Provide teaching at level of understanding  - Provide teaching via preferred learning methods  Outcome: Progressing     Problem: Nutrition/Hydration-ADULT  Goal: Nutrient/Hydration intake appropriate for improving, restoring or maintaining nutritional needs  Description: Monitor and assess patient's nutrition/hydration status for malnutrition  Collaborate with interdisciplinary team and initiate plan and interventions as ordered  Monitor patient's weight and dietary intake as ordered or per policy  Utilize nutrition screening tool and intervene as necessary  Determine patient's food preferences and provide high-protein, high-caloric foods as appropriate       INTERVENTIONS:  - Monitor oral intake, urinary output, labs, and treatment plans  - Assess nutrition and hydration status and recommend course of action  - Evaluate amount of meals eaten  - Assist patient with eating if necessary   - Allow adequate time for meals  - Recommend/ encourage appropriate diets, oral nutritional supplements, and vitamin/mineral supplements  - Order, calculate, and assess calorie counts as needed  - Recommend, monitor, and adjust tube feedings and TPN/PPN based on assessed needs  - Assess need for intravenous fluids  - Provide specific nutrition/hydration education as appropriate  - Include patient/family/caregiver in decisions related to nutrition  Outcome: Progressing

## 2023-06-03 NOTE — PLAN OF CARE
Problem: MOBILITY - ADULT  Goal: Maintain or return to baseline ADL function  Description: INTERVENTIONS:  -  Assess patient's ability to carry out ADLs; assess patient's baseline for ADL function and identify physical deficits which impact ability to perform ADLs (bathing, care of mouth/teeth, toileting, grooming, dressing, etc )  - Assess/evaluate cause of self-care deficits   - Assess range of motion  - Assess patient's mobility; develop plan if impaired  - Assess patient's need for assistive devices and provide as appropriate  - Encourage maximum independence but intervene and supervise when necessary  - Involve family in performance of ADLs  - Assess for home care needs following discharge   - Consider OT consult to assist with ADL evaluation and planning for discharge  - Provide patient education as appropriate  Outcome: Progressing  Goal: Maintains/Returns to pre admission functional level  Description: INTERVENTIONS:  - Perform BMAT or MOVE assessment daily    - Set and communicate daily mobility goal to care team and patient/family/caregiver  - Collaborate with rehabilitation services on mobility goals if consulted  - Perform Range of Motion 3-4 times a day  - Reposition patient every 2 hours    - Dangle patient 3 times a day  - Stand patient 3 times a day  - Ambulate patient 3 times a day  - Out of bed to chair 3 times a day   - Out of bed for meals 3 times a day  - Out of bed for toileting  - Record patient progress and toleration of activity level   Outcome: Progressing     Problem: GASTROINTESTINAL - ADULT  Goal: Minimal or absence of nausea and/or vomiting  Description: INTERVENTIONS:  - Administer IV fluids if ordered to ensure adequate hydration  - Maintain NPO status until nausea and vomiting are resolved  - Nasogastric tube if ordered  - Administer ordered antiemetic medications as needed  - Provide nonpharmacologic comfort measures as appropriate  - Advance diet as tolerated, if ordered  - Consider nutrition services referral to assist patient with adequate nutrition and appropriate food choices  Outcome: Progressing  Goal: Maintains or returns to baseline bowel function  Description: INTERVENTIONS:  - Assess bowel function  - Encourage oral fluids to ensure adequate hydration  - Administer IV fluids if ordered to ensure adequate hydration  - Administer ordered medications as needed  - Encourage mobilization and activity  - Consider nutritional services referral to assist patient with adequate nutrition and appropriate food choices  Outcome: Progressing  Goal: Maintains adequate nutritional intake  Description: INTERVENTIONS:  - Monitor percentage of each meal consumed  - Identify factors contributing to decreased intake, treat as appropriate  - Assist with meals as needed  - Monitor I&O, weight, and lab values if indicated  - Obtain nutrition services referral as needed  Outcome: Progressing     Problem: METABOLIC, FLUID AND ELECTROLYTES - ADULT  Goal: Electrolytes maintained within normal limits  Description: INTERVENTIONS:  - Monitor labs and assess patient for signs and symptoms of electrolyte imbalances  - Administer electrolyte replacement as ordered  - Monitor response to electrolyte replacements, including repeat lab results as appropriate  - Instruct patient on fluid and nutrition as appropriate  Outcome: Progressing  Goal: Fluid balance maintained  Description: INTERVENTIONS:  - Monitor labs   - Monitor I/O and WT  - Instruct patient on fluid and nutrition as appropriate  - Assess for signs & symptoms of volume excess or deficit  Outcome: Progressing     Problem: PAIN - ADULT  Goal: Verbalizes/displays adequate comfort level or baseline comfort level  Description: Interventions:  - Encourage patient to monitor pain and request assistance  - Assess pain using appropriate pain scale  - Administer analgesics based on type and severity of pain and evaluate response  - Implement non-pharmacological measures as appropriate and evaluate response  - Consider cultural and social influences on pain and pain management  - Notify physician/advanced practitioner if interventions unsuccessful or patient reports new pain  Outcome: Progressing     Problem: INFECTION - ADULT  Goal: Absence or prevention of progression during hospitalization  Description: INTERVENTIONS:  - Assess and monitor for signs and symptoms of infection  - Monitor lab/diagnostic results  - Monitor all insertion sites, i e  indwelling lines, tubes, and drains  - Monitor endotracheal if appropriate and nasal secretions for changes in amount and color  - [place] appropriate cooling/warming therapies per order  - Administer medications as ordered  - Instruct and encourage patient and family to use good hand hygiene technique  - Identify and instruct in appropriate isolation precautions for identified infection/condition  Outcome: Progressing     Problem: SAFETY ADULT  Goal: Maintain or return to baseline ADL function  Description: INTERVENTIONS:  -  Assess patient's ability to carry out ADLs; assess patient's baseline for ADL function and identify physical deficits which impact ability to perform ADLs (bathing, care of mouth/teeth, toileting, grooming, dressing, etc )  - Assess/evaluate cause of self-care deficits   - Assess range of motion  - Assess patient's mobility; develop plan if impaired  - Assess patient's need for assistive devices and provide as appropriate  - Encourage maximum independence but intervene and supervise when necessary  - Involve family in performance of ADLs  - Assess for home care needs following discharge   - Consider OT consult to assist with ADL evaluation and planning for discharge  - Provide patient education as appropriate  Outcome: Progressing  Goal: Maintains/Returns to pre admission functional level  Description: INTERVENTIONS:  - Perform BMAT or MOVE assessment daily    - Set and communicate daily mobility goal to care team and patient/family/caregiver  - Collaborate with rehabilitation services on mobility goals if consulted  - Perform Range of Motion 3-4 times a day  - Reposition patient every 2 hours    - Dangle patient 3 times a day  - Stand patient 3 times a day  - Ambulate patient 3 times a day  - Out of bed to chair 3 times a day   - Out of bed for meals 3 times a day  - Out of bed for toileting  - Record patient progress and toleration of activity level   Outcome: Progressing  Goal: Patient will remain free of falls  Description: INTERVENTIONS:  - Educate patient/family on patient safety including physical limitations  - Instruct patient to call for assistance with activity   - Consult OT/PT to assist with strengthening/mobility   - Keep Call bell within reach  - Keep bed low and locked with side rails adjusted as appropriate  - Keep care items and personal belongings within reach  - Initiate and maintain comfort rounds  - Make Fall Risk Sign visible to staff  - Offer Toileting every 2 Hours, in advance of need  - Initiate/Maintain bed/ chair alarm  - Apply yellow socks and bracelet for high fall risk patients  - Consider moving patient to room near nurses station  Outcome: Progressing     Problem: DISCHARGE PLANNING  Goal: Discharge to home or other facility with appropriate resources  Description: INTERVENTIONS:  - Identify barriers to discharge w/patient and caregiver  - Arrange for needed discharge resources and transportation as appropriate  - Identify discharge learning needs (meds, wound care, etc )  - Arrange for interpretive services to assist at discharge as needed  - Refer to Case Management Department for coordinating discharge planning if the patient needs post-hospital services based on physician/advanced practitioner order or complex needs related to functional status, cognitive ability, or social support system  Outcome: Progressing     Problem: Knowledge Deficit  Goal: Patient/family/caregiver demonstrates understanding of disease process, treatment plan, medications, and discharge instructions  Description: Complete learning assessment and assess knowledge base  Interventions:  - Provide teaching at level of understanding  - Provide teaching via preferred learning methods  Outcome: Progressing     Problem: Nutrition/Hydration-ADULT  Goal: Nutrient/Hydration intake appropriate for improving, restoring or maintaining nutritional needs  Description: Monitor and assess patient's nutrition/hydration status for malnutrition  Collaborate with interdisciplinary team and initiate plan and interventions as ordered  Monitor patient's weight and dietary intake as ordered or per policy  Utilize nutrition screening tool and intervene as necessary  Determine patient's food preferences and provide high-protein, high-caloric foods as appropriate       INTERVENTIONS:  - Monitor oral intake, urinary output, labs, and treatment plans  - Assess nutrition and hydration status and recommend course of action  - Evaluate amount of meals eaten  - Assist patient with eating if necessary   - Allow adequate time for meals  - Recommend/ encourage appropriate diets, oral nutritional supplements, and vitamin/mineral supplements  - Order, calculate, and assess calorie counts as needed  - Recommend, monitor, and adjust tube feedings and TPN/PPN based on assessed needs  - Assess need for intravenous fluids  - Provide specific nutrition/hydration education as appropriate  - Include patient/family/caregiver in decisions related to nutrition  Outcome: Progressing

## 2023-06-04 LAB
ALBUMIN SERPL BCP-MCNC: 3.3 G/DL (ref 3.5–5)
ALP SERPL-CCNC: 53 U/L (ref 34–104)
ALT SERPL W P-5'-P-CCNC: 38 U/L (ref 7–52)
ANION GAP SERPL CALCULATED.3IONS-SCNC: 7 MMOL/L (ref 4–13)
AST SERPL W P-5'-P-CCNC: 16 U/L (ref 13–39)
BASOPHILS # BLD AUTO: 0.04 THOUSANDS/ÂΜL (ref 0–0.1)
BASOPHILS NFR BLD AUTO: 0 % (ref 0–1)
BILIRUB SERPL-MCNC: 0.2 MG/DL (ref 0.2–1)
BUN SERPL-MCNC: 9 MG/DL (ref 5–25)
CALCIUM ALBUM COR SERPL-MCNC: 8.2 MG/DL (ref 8.3–10.1)
CALCIUM SERPL-MCNC: 7.6 MG/DL (ref 8.4–10.2)
CHLORIDE SERPL-SCNC: 104 MMOL/L (ref 96–108)
CO2 SERPL-SCNC: 29 MMOL/L (ref 21–32)
CREAT SERPL-MCNC: 0.61 MG/DL (ref 0.6–1.3)
EOSINOPHIL # BLD AUTO: 0.01 THOUSAND/ÂΜL (ref 0–0.61)
EOSINOPHIL NFR BLD AUTO: 0 % (ref 0–6)
ERYTHROCYTE [DISTWIDTH] IN BLOOD BY AUTOMATED COUNT: 14.4 % (ref 11.6–15.1)
GFR SERPL CREATININE-BSD FRML MDRD: 106 ML/MIN/1.73SQ M
GLUCOSE SERPL-MCNC: 131 MG/DL (ref 65–140)
HCT VFR BLD AUTO: 40.3 % (ref 34.8–46.1)
HGB BLD-MCNC: 13.3 G/DL (ref 11.5–15.4)
IMM GRANULOCYTES # BLD AUTO: 0.34 THOUSAND/UL (ref 0–0.2)
IMM GRANULOCYTES NFR BLD AUTO: 2 % (ref 0–2)
LYMPHOCYTES # BLD AUTO: 1.68 THOUSANDS/ÂΜL (ref 0.6–4.47)
LYMPHOCYTES NFR BLD AUTO: 12 % (ref 14–44)
MAGNESIUM SERPL-MCNC: 2 MG/DL (ref 1.9–2.7)
MCH RBC QN AUTO: 30.5 PG (ref 26.8–34.3)
MCHC RBC AUTO-ENTMCNC: 33 G/DL (ref 31.4–37.4)
MCV RBC AUTO: 92 FL (ref 82–98)
MONOCYTES # BLD AUTO: 1.05 THOUSAND/ÂΜL (ref 0.17–1.22)
MONOCYTES NFR BLD AUTO: 7 % (ref 4–12)
NEUTROPHILS # BLD AUTO: 11.27 THOUSANDS/ÂΜL (ref 1.85–7.62)
NEUTS SEG NFR BLD AUTO: 79 % (ref 43–75)
NRBC BLD AUTO-RTO: 1 /100 WBCS
PLATELET # BLD AUTO: 517 THOUSANDS/UL (ref 149–390)
PMV BLD AUTO: 8.5 FL (ref 8.9–12.7)
POTASSIUM SERPL-SCNC: 3.5 MMOL/L (ref 3.5–5.3)
PROT SERPL-MCNC: 5.6 G/DL (ref 6.4–8.4)
RBC # BLD AUTO: 4.36 MILLION/UL (ref 3.81–5.12)
SODIUM SERPL-SCNC: 140 MMOL/L (ref 135–147)
WBC # BLD AUTO: 14.39 THOUSAND/UL (ref 4.31–10.16)

## 2023-06-04 PROCEDURE — 99233 SBSQ HOSP IP/OBS HIGH 50: CPT | Performed by: PHYSICIAN ASSISTANT

## 2023-06-04 PROCEDURE — 83735 ASSAY OF MAGNESIUM: CPT | Performed by: PHYSICIAN ASSISTANT

## 2023-06-04 PROCEDURE — C9113 INJ PANTOPRAZOLE SODIUM, VIA: HCPCS | Performed by: PHYSICIAN ASSISTANT

## 2023-06-04 PROCEDURE — 80053 COMPREHEN METABOLIC PANEL: CPT | Performed by: PHYSICIAN ASSISTANT

## 2023-06-04 PROCEDURE — 85025 COMPLETE CBC W/AUTO DIFF WBC: CPT | Performed by: PHYSICIAN ASSISTANT

## 2023-06-04 RX ADMIN — METHYLPREDNISOLONE SODIUM SUCCINATE 20 MG: 40 INJECTION, POWDER, FOR SOLUTION INTRAMUSCULAR; INTRAVENOUS at 21:10

## 2023-06-04 RX ADMIN — DIPHENHYDRAMINE HYDROCHLORIDE 50 MG: 50 INJECTION, SOLUTION INTRAMUSCULAR; INTRAVENOUS at 05:19

## 2023-06-04 RX ADMIN — PANTOPRAZOLE SODIUM 40 MG: 40 INJECTION, POWDER, FOR SOLUTION INTRAVENOUS at 09:29

## 2023-06-04 RX ADMIN — HYDROMORPHONE HYDROCHLORIDE 1 MG: 1 INJECTION, SOLUTION INTRAMUSCULAR; INTRAVENOUS; SUBCUTANEOUS at 19:52

## 2023-06-04 RX ADMIN — HYDROMORPHONE HYDROCHLORIDE 1 MG: 1 INJECTION, SOLUTION INTRAMUSCULAR; INTRAVENOUS; SUBCUTANEOUS at 14:27

## 2023-06-04 RX ADMIN — DIPHENHYDRAMINE HYDROCHLORIDE 50 MG: 50 INJECTION, SOLUTION INTRAMUSCULAR; INTRAVENOUS at 12:28

## 2023-06-04 RX ADMIN — HYDROMORPHONE HYDROCHLORIDE 1 MG: 1 INJECTION, SOLUTION INTRAMUSCULAR; INTRAVENOUS; SUBCUTANEOUS at 09:32

## 2023-06-04 RX ADMIN — METHYLPREDNISOLONE SODIUM SUCCINATE 20 MG: 40 INJECTION, POWDER, FOR SOLUTION INTRAMUSCULAR; INTRAVENOUS at 05:19

## 2023-06-04 RX ADMIN — DIPHENHYDRAMINE HYDROCHLORIDE 50 MG: 50 INJECTION, SOLUTION INTRAMUSCULAR; INTRAVENOUS at 18:32

## 2023-06-04 RX ADMIN — LORAZEPAM 1 MG: 2 INJECTION INTRAMUSCULAR; INTRAVENOUS at 21:10

## 2023-06-04 RX ADMIN — HYDROMORPHONE HYDROCHLORIDE 1 MG: 1 INJECTION, SOLUTION INTRAMUSCULAR; INTRAVENOUS; SUBCUTANEOUS at 04:52

## 2023-06-04 RX ADMIN — FAMOTIDINE 20 MG: 10 INJECTION, SOLUTION INTRAVENOUS at 21:10

## 2023-06-04 RX ADMIN — ONDANSETRON 4 MG: 2 INJECTION INTRAMUSCULAR; INTRAVENOUS at 12:28

## 2023-06-04 RX ADMIN — SODIUM CHLORIDE 75 ML/HR: 0.9 INJECTION, SOLUTION INTRAVENOUS at 00:15

## 2023-06-04 RX ADMIN — ONDANSETRON 4 MG: 2 INJECTION INTRAMUSCULAR; INTRAVENOUS at 06:43

## 2023-06-04 RX ADMIN — LORAZEPAM 1 MG: 2 INJECTION INTRAMUSCULAR; INTRAVENOUS at 06:43

## 2023-06-04 RX ADMIN — FAMOTIDINE 20 MG: 10 INJECTION, SOLUTION INTRAVENOUS at 09:29

## 2023-06-04 RX ADMIN — DIPHENHYDRAMINE HYDROCHLORIDE 50 MG: 50 INJECTION, SOLUTION INTRAMUSCULAR; INTRAVENOUS at 09:31

## 2023-06-04 RX ADMIN — ONDANSETRON 4 MG: 2 INJECTION INTRAMUSCULAR; INTRAVENOUS at 18:32

## 2023-06-04 RX ADMIN — LORAZEPAM 1 MG: 2 INJECTION INTRAMUSCULAR; INTRAVENOUS at 12:28

## 2023-06-04 RX ADMIN — METHYLPREDNISOLONE SODIUM SUCCINATE 20 MG: 40 INJECTION, POWDER, FOR SOLUTION INTRAMUSCULAR; INTRAVENOUS at 14:27

## 2023-06-04 NOTE — PLAN OF CARE
Problem: MOBILITY - ADULT  Goal: Maintain or return to baseline ADL function  Description: INTERVENTIONS:  -  Assess patient's ability to carry out ADLs; assess patient's baseline for ADL function and identify physical deficits which impact ability to perform ADLs (bathing, care of mouth/teeth, toileting, grooming, dressing, etc )  - Assess/evaluate cause of self-care deficits   - Assess range of motion  - Assess patient's mobility; develop plan if impaired  - Assess patient's need for assistive devices and provide as appropriate  - Encourage maximum independence but intervene and supervise when necessary  - Involve family in performance of ADLs  - Assess for home care needs following discharge   - Consider OT consult to assist with ADL evaluation and planning for discharge  - Provide patient education as appropriate  Outcome: Progressing  Goal: Maintains/Returns to pre admission functional level  Description: INTERVENTIONS:  - Perform BMAT or MOVE assessment daily    - Set and communicate daily mobility goal to care team and patient/family/caregiver  - Collaborate with rehabilitation services on mobility goals if consulted  - Perform Range of Motion 3-4 times a day  - Reposition patient every 2 hours    - Dangle patient 3 times a day  - Stand patient 3 times a day  - Ambulate patient 3 times a day  - Out of bed to chair 3 times a day   - Out of bed for meals 3 times a day  - Out of bed for toileting  - Record patient progress and toleration of activity level   Outcome: Progressing     Problem: GASTROINTESTINAL - ADULT  Goal: Minimal or absence of nausea and/or vomiting  Description: INTERVENTIONS:  - Administer IV fluids if ordered to ensure adequate hydration  - Maintain NPO status until nausea and vomiting are resolved  - Nasogastric tube if ordered  - Administer ordered antiemetic medications as needed  - Provide nonpharmacologic comfort measures as appropriate  - Advance diet as tolerated, if ordered  - Consider nutrition services referral to assist patient with adequate nutrition and appropriate food choices  Outcome: Progressing  Goal: Maintains or returns to baseline bowel function  Description: INTERVENTIONS:  - Assess bowel function  - Encourage oral fluids to ensure adequate hydration  - Administer IV fluids if ordered to ensure adequate hydration  - Administer ordered medications as needed  - Encourage mobilization and activity  - Consider nutritional services referral to assist patient with adequate nutrition and appropriate food choices  Outcome: Progressing  Goal: Maintains adequate nutritional intake  Description: INTERVENTIONS:  - Monitor percentage of each meal consumed  - Identify factors contributing to decreased intake, treat as appropriate  - Assist with meals as needed  - Monitor I&O, weight, and lab values if indicated  - Obtain nutrition services referral as needed  Outcome: Progressing     Problem: METABOLIC, FLUID AND ELECTROLYTES - ADULT  Goal: Electrolytes maintained within normal limits  Description: INTERVENTIONS:  - Monitor labs and assess patient for signs and symptoms of electrolyte imbalances  - Administer electrolyte replacement as ordered  - Monitor response to electrolyte replacements, including repeat lab results as appropriate  - Instruct patient on fluid and nutrition as appropriate  Outcome: Progressing  Goal: Fluid balance maintained  Description: INTERVENTIONS:  - Monitor labs   - Monitor I/O and WT  - Instruct patient on fluid and nutrition as appropriate  - Assess for signs & symptoms of volume excess or deficit  Outcome: Progressing     Problem: PAIN - ADULT  Goal: Verbalizes/displays adequate comfort level or baseline comfort level  Description: Interventions:  - Encourage patient to monitor pain and request assistance  - Assess pain using appropriate pain scale  - Administer analgesics based on type and severity of pain and evaluate response  - Implement non-pharmacological measures as appropriate and evaluate response  - Consider cultural and social influences on pain and pain management  - Notify physician/advanced practitioner if interventions unsuccessful or patient reports new pain  Outcome: Progressing     Problem: INFECTION - ADULT  Goal: Absence or prevention of progression during hospitalization  Description: INTERVENTIONS:  - Assess and monitor for signs and symptoms of infection  - Monitor lab/diagnostic results  - Monitor all insertion sites, i e  indwelling lines, tubes, and drains  - Monitor endotracheal if appropriate and nasal secretions for changes in amount and color  - [place] appropriate cooling/warming therapies per order  - Administer medications as ordered  - Instruct and encourage patient and family to use good hand hygiene technique  - Identify and instruct in appropriate isolation precautions for identified infection/condition  Outcome: Progressing     Problem: SAFETY ADULT  Goal: Maintain or return to baseline ADL function  Description: INTERVENTIONS:  -  Assess patient's ability to carry out ADLs; assess patient's baseline for ADL function and identify physical deficits which impact ability to perform ADLs (bathing, care of mouth/teeth, toileting, grooming, dressing, etc )  - Assess/evaluate cause of self-care deficits   - Assess range of motion  - Assess patient's mobility; develop plan if impaired  - Assess patient's need for assistive devices and provide as appropriate  - Encourage maximum independence but intervene and supervise when necessary  - Involve family in performance of ADLs  - Assess for home care needs following discharge   - Consider OT consult to assist with ADL evaluation and planning for discharge  - Provide patient education as appropriate  Outcome: Progressing  Goal: Maintains/Returns to pre admission functional level  Description: INTERVENTIONS:  - Perform BMAT or MOVE assessment daily    - Set and communicate daily mobility goal to care team and patient/family/caregiver  - Collaborate with rehabilitation services on mobility goals if consulted  - Perform Range of Motion 3-4 times a day  - Reposition patient every 2 hours    - Dangle patient 3 times a day  - Stand patient 3 times a day  - Ambulate patient 3 times a day  - Out of bed to chair 3 times a day   - Out of bed for meals 3 times a day  - Out of bed for toileting  - Record patient progress and toleration of activity level   Outcome: Progressing  Goal: Patient will remain free of falls  Description: INTERVENTIONS:  - Educate patient/family on patient safety including physical limitations  - Instruct patient to call for assistance with activity   - Consult OT/PT to assist with strengthening/mobility   - Keep Call bell within reach  - Keep bed low and locked with side rails adjusted as appropriate  - Keep care items and personal belongings within reach  - Initiate and maintain comfort rounds  - Make Fall Risk Sign visible to staff  - Offer Toileting every 2 Hours, in advance of need  - Initiate/Maintain bed/ chair alarm  - Apply yellow socks and bracelet for high fall risk patients  - Consider moving patient to room near nurses station  Outcome: Progressing     Problem: DISCHARGE PLANNING  Goal: Discharge to home or other facility with appropriate resources  Description: INTERVENTIONS:  - Identify barriers to discharge w/patient and caregiver  - Arrange for needed discharge resources and transportation as appropriate  - Identify discharge learning needs (meds, wound care, etc )  - Arrange for interpretive services to assist at discharge as needed  - Refer to Case Management Department for coordinating discharge planning if the patient needs post-hospital services based on physician/advanced practitioner order or complex needs related to functional status, cognitive ability, or social support system  Outcome: Progressing     Problem: Knowledge Deficit  Goal: Patient/family/caregiver demonstrates understanding of disease process, treatment plan, medications, and discharge instructions  Description: Complete learning assessment and assess knowledge base  Interventions:  - Provide teaching at level of understanding  - Provide teaching via preferred learning methods  Outcome: Progressing     Problem: Nutrition/Hydration-ADULT  Goal: Nutrient/Hydration intake appropriate for improving, restoring or maintaining nutritional needs  Description: Monitor and assess patient's nutrition/hydration status for malnutrition  Collaborate with interdisciplinary team and initiate plan and interventions as ordered  Monitor patient's weight and dietary intake as ordered or per policy  Utilize nutrition screening tool and intervene as necessary  Determine patient's food preferences and provide high-protein, high-caloric foods as appropriate       INTERVENTIONS:  - Monitor oral intake, urinary output, labs, and treatment plans  - Assess nutrition and hydration status and recommend course of action  - Evaluate amount of meals eaten  - Assist patient with eating if necessary   - Allow adequate time for meals  - Recommend/ encourage appropriate diets, oral nutritional supplements, and vitamin/mineral supplements  - Order, calculate, and assess calorie counts as needed  - Recommend, monitor, and adjust tube feedings and TPN/PPN based on assessed needs  - Assess need for intravenous fluids  - Provide specific nutrition/hydration education as appropriate  - Include patient/family/caregiver in decisions related to nutrition  Outcome: Progressing

## 2023-06-04 NOTE — PLAN OF CARE
Problem: MOBILITY - ADULT  Goal: Maintain or return to baseline ADL function  Description: INTERVENTIONS:  -  Assess patient's ability to carry out ADLs; assess patient's baseline for ADL function and identify physical deficits which impact ability to perform ADLs (bathing, care of mouth/teeth, toileting, grooming, dressing, etc )  - Assess/evaluate cause of self-care deficits   - Assess range of motion  - Assess patient's mobility; develop plan if impaired  - Assess patient's need for assistive devices and provide as appropriate  - Encourage maximum independence but intervene and supervise when necessary  - Involve family in performance of ADLs  - Assess for home care needs following discharge   - Consider OT consult to assist with ADL evaluation and planning for discharge  - Provide patient education as appropriate  Outcome: Progressing  Goal: Maintains/Returns to pre admission functional level  Description: INTERVENTIONS:  - Perform BMAT or MOVE assessment daily    - Set and communicate daily mobility goal to care team and patient/family/caregiver  - Collaborate with rehabilitation services on mobility goals if consulted  - Perform Range of Motion 3-4 times a day  - Reposition patient every 2 hours    - Dangle patient 3 times a day  - Stand patient 3 times a day  - Ambulate patient 3 times a day  - Out of bed to chair 3 times a day   - Out of bed for meals 3 times a day  - Out of bed for toileting  - Record patient progress and toleration of activity level   Outcome: Progressing     Problem: GASTROINTESTINAL - ADULT  Goal: Minimal or absence of nausea and/or vomiting  Description: INTERVENTIONS:  - Administer IV fluids if ordered to ensure adequate hydration  - Maintain NPO status until nausea and vomiting are resolved  - Nasogastric tube if ordered  - Administer ordered antiemetic medications as needed  - Provide nonpharmacologic comfort measures as appropriate  - Advance diet as tolerated, if ordered  - Consider nutrition services referral to assist patient with adequate nutrition and appropriate food choices  Outcome: Progressing  Goal: Maintains or returns to baseline bowel function  Description: INTERVENTIONS:  - Assess bowel function  - Encourage oral fluids to ensure adequate hydration  - Administer IV fluids if ordered to ensure adequate hydration  - Administer ordered medications as needed  - Encourage mobilization and activity  - Consider nutritional services referral to assist patient with adequate nutrition and appropriate food choices  Outcome: Progressing  Goal: Maintains adequate nutritional intake  Description: INTERVENTIONS:  - Monitor percentage of each meal consumed  - Identify factors contributing to decreased intake, treat as appropriate  - Assist with meals as needed  - Monitor I&O, weight, and lab values if indicated  - Obtain nutrition services referral as needed  Outcome: Progressing     Problem: METABOLIC, FLUID AND ELECTROLYTES - ADULT  Goal: Electrolytes maintained within normal limits  Description: INTERVENTIONS:  - Monitor labs and assess patient for signs and symptoms of electrolyte imbalances  - Administer electrolyte replacement as ordered  - Monitor response to electrolyte replacements, including repeat lab results as appropriate  - Instruct patient on fluid and nutrition as appropriate  Outcome: Progressing  Goal: Fluid balance maintained  Description: INTERVENTIONS:  - Monitor labs   - Monitor I/O and WT  - Instruct patient on fluid and nutrition as appropriate  - Assess for signs & symptoms of volume excess or deficit  Outcome: Progressing     Problem: PAIN - ADULT  Goal: Verbalizes/displays adequate comfort level or baseline comfort level  Description: Interventions:  - Encourage patient to monitor pain and request assistance  - Assess pain using appropriate pain scale  - Administer analgesics based on type and severity of pain and evaluate response  - Implement non-pharmacological measures as appropriate and evaluate response  - Consider cultural and social influences on pain and pain management  - Notify physician/advanced practitioner if interventions unsuccessful or patient reports new pain  Outcome: Progressing     Problem: INFECTION - ADULT  Goal: Absence or prevention of progression during hospitalization  Description: INTERVENTIONS:  - Assess and monitor for signs and symptoms of infection  - Monitor lab/diagnostic results  - Monitor all insertion sites, i e  indwelling lines, tubes, and drains  - Monitor endotracheal if appropriate and nasal secretions for changes in amount and color  - [place] appropriate cooling/warming therapies per order  - Administer medications as ordered  - Instruct and encourage patient and family to use good hand hygiene technique  - Identify and instruct in appropriate isolation precautions for identified infection/condition  Outcome: Progressing     Problem: SAFETY ADULT  Goal: Maintain or return to baseline ADL function  Description: INTERVENTIONS:  -  Assess patient's ability to carry out ADLs; assess patient's baseline for ADL function and identify physical deficits which impact ability to perform ADLs (bathing, care of mouth/teeth, toileting, grooming, dressing, etc )  - Assess/evaluate cause of self-care deficits   - Assess range of motion  - Assess patient's mobility; develop plan if impaired  - Assess patient's need for assistive devices and provide as appropriate  - Encourage maximum independence but intervene and supervise when necessary  - Involve family in performance of ADLs  - Assess for home care needs following discharge   - Consider OT consult to assist with ADL evaluation and planning for discharge  - Provide patient education as appropriate  Outcome: Progressing  Goal: Maintains/Returns to pre admission functional level  Description: INTERVENTIONS:  - Perform BMAT or MOVE assessment daily    - Set and communicate daily mobility goal to care team and patient/family/caregiver  - Collaborate with rehabilitation services on mobility goals if consulted  - Perform Range of Motion 3-4 times a day  - Reposition patient every 2 hours    - Dangle patient 3 times a day  - Stand patient 3 times a day  - Ambulate patient 3 times a day  - Out of bed to chair 3 times a day   - Out of bed for meals 3 times a day  - Out of bed for toileting  - Record patient progress and toleration of activity level   Outcome: Progressing  Goal: Patient will remain free of falls  Description: INTERVENTIONS:  - Educate patient/family on patient safety including physical limitations  - Instruct patient to call for assistance with activity   - Consult OT/PT to assist with strengthening/mobility   - Keep Call bell within reach  - Keep bed low and locked with side rails adjusted as appropriate  - Keep care items and personal belongings within reach  - Initiate and maintain comfort rounds  - Make Fall Risk Sign visible to staff  - Offer Toileting every 2 Hours, in advance of need  - Initiate/Maintain bed/ chair alarm  - Apply yellow socks and bracelet for high fall risk patients  - Consider moving patient to room near nurses station  Outcome: Progressing     Problem: DISCHARGE PLANNING  Goal: Discharge to home or other facility with appropriate resources  Description: INTERVENTIONS:  - Identify barriers to discharge w/patient and caregiver  - Arrange for needed discharge resources and transportation as appropriate  - Identify discharge learning needs (meds, wound care, etc )  - Arrange for interpretive services to assist at discharge as needed  - Refer to Case Management Department for coordinating discharge planning if the patient needs post-hospital services based on physician/advanced practitioner order or complex needs related to functional status, cognitive ability, or social support system  Outcome: Progressing     Problem: Knowledge Deficit  Goal: Patient/family/caregiver demonstrates understanding of disease process, treatment plan, medications, and discharge instructions  Description: Complete learning assessment and assess knowledge base  Interventions:  - Provide teaching at level of understanding  - Provide teaching via preferred learning methods  Outcome: Progressing     Problem: Nutrition/Hydration-ADULT  Goal: Nutrient/Hydration intake appropriate for improving, restoring or maintaining nutritional needs  Description: Monitor and assess patient's nutrition/hydration status for malnutrition  Collaborate with interdisciplinary team and initiate plan and interventions as ordered  Monitor patient's weight and dietary intake as ordered or per policy  Utilize nutrition screening tool and intervene as necessary  Determine patient's food preferences and provide high-protein, high-caloric foods as appropriate       INTERVENTIONS:  - Monitor oral intake, urinary output, labs, and treatment plans  - Assess nutrition and hydration status and recommend course of action  - Evaluate amount of meals eaten  - Assist patient with eating if necessary   - Allow adequate time for meals  - Recommend/ encourage appropriate diets, oral nutritional supplements, and vitamin/mineral supplements  - Order, calculate, and assess calorie counts as needed  - Recommend, monitor, and adjust tube feedings and TPN/PPN based on assessed needs  - Assess need for intravenous fluids  - Provide specific nutrition/hydration education as appropriate  - Include patient/family/caregiver in decisions related to nutrition  Outcome: Progressing

## 2023-06-04 NOTE — PLAN OF CARE
Problem: MOBILITY - ADULT  Goal: Maintain or return to baseline ADL function  Description: INTERVENTIONS:  -  Assess patient's ability to carry out ADLs; assess patient's baseline for ADL function and identify physical deficits which impact ability to perform ADLs (bathing, care of mouth/teeth, toileting, grooming, dressing, etc )  - Assess/evaluate cause of self-care deficits   - Assess range of motion  - Assess patient's mobility; develop plan if impaired  - Assess patient's need for assistive devices and provide as appropriate  - Encourage maximum independence but intervene and supervise when necessary  - Involve family in performance of ADLs  - Assess for home care needs following discharge   - Consider OT consult to assist with ADL evaluation and planning for discharge  - Provide patient education as appropriate  Outcome: Progressing  Goal: Maintains/Returns to pre admission functional level  Description: INTERVENTIONS:  - Perform BMAT or MOVE assessment daily    - Set and communicate daily mobility goal to care team and patient/family/caregiver  - Collaborate with rehabilitation services on mobility goals if consulted  - Perform Range of Motion 3-4 times a day  - Reposition patient every 2 hours    - Dangle patient 3 times a day  - Stand patient 3 times a day  - Ambulate patient 3 times a day  - Out of bed to chair 3 times a day   - Out of bed for meals 3 times a day  - Out of bed for toileting  - Record patient progress and toleration of activity level   Outcome: Progressing     Problem: GASTROINTESTINAL - ADULT  Goal: Minimal or absence of nausea and/or vomiting  Description: INTERVENTIONS:  - Administer IV fluids if ordered to ensure adequate hydration  - Maintain NPO status until nausea and vomiting are resolved  - Nasogastric tube if ordered  - Administer ordered antiemetic medications as needed  - Provide nonpharmacologic comfort measures as appropriate  - Advance diet as tolerated, if ordered  - Consider nutrition services referral to assist patient with adequate nutrition and appropriate food choices  Outcome: Progressing  Goal: Maintains or returns to baseline bowel function  Description: INTERVENTIONS:  - Assess bowel function  - Encourage oral fluids to ensure adequate hydration  - Administer IV fluids if ordered to ensure adequate hydration  - Administer ordered medications as needed  - Encourage mobilization and activity  - Consider nutritional services referral to assist patient with adequate nutrition and appropriate food choices  Outcome: Progressing  Goal: Maintains adequate nutritional intake  Description: INTERVENTIONS:  - Monitor percentage of each meal consumed  - Identify factors contributing to decreased intake, treat as appropriate  - Assist with meals as needed  - Monitor I&O, weight, and lab values if indicated  - Obtain nutrition services referral as needed  Outcome: Progressing     Problem: METABOLIC, FLUID AND ELECTROLYTES - ADULT  Goal: Electrolytes maintained within normal limits  Description: INTERVENTIONS:  - Monitor labs and assess patient for signs and symptoms of electrolyte imbalances  - Administer electrolyte replacement as ordered  - Monitor response to electrolyte replacements, including repeat lab results as appropriate  - Instruct patient on fluid and nutrition as appropriate  Outcome: Progressing  Goal: Fluid balance maintained  Description: INTERVENTIONS:  - Monitor labs   - Monitor I/O and WT  - Instruct patient on fluid and nutrition as appropriate  - Assess for signs & symptoms of volume excess or deficit  Outcome: Progressing     Problem: PAIN - ADULT  Goal: Verbalizes/displays adequate comfort level or baseline comfort level  Description: Interventions:  - Encourage patient to monitor pain and request assistance  - Assess pain using appropriate pain scale  - Administer analgesics based on type and severity of pain and evaluate response  - Implement non-pharmacological measures as appropriate and evaluate response  - Consider cultural and social influences on pain and pain management  - Notify physician/advanced practitioner if interventions unsuccessful or patient reports new pain  Outcome: Progressing     Problem: INFECTION - ADULT  Goal: Absence or prevention of progression during hospitalization  Description: INTERVENTIONS:  - Assess and monitor for signs and symptoms of infection  - Monitor lab/diagnostic results  - Monitor all insertion sites, i e  indwelling lines, tubes, and drains  - Monitor endotracheal if appropriate and nasal secretions for changes in amount and color  - [place] appropriate cooling/warming therapies per order  - Administer medications as ordered  - Instruct and encourage patient and family to use good hand hygiene technique  - Identify and instruct in appropriate isolation precautions for identified infection/condition  Outcome: Progressing     Problem: SAFETY ADULT  Goal: Maintain or return to baseline ADL function  Description: INTERVENTIONS:  -  Assess patient's ability to carry out ADLs; assess patient's baseline for ADL function and identify physical deficits which impact ability to perform ADLs (bathing, care of mouth/teeth, toileting, grooming, dressing, etc )  - Assess/evaluate cause of self-care deficits   - Assess range of motion  - Assess patient's mobility; develop plan if impaired  - Assess patient's need for assistive devices and provide as appropriate  - Encourage maximum independence but intervene and supervise when necessary  - Involve family in performance of ADLs  - Assess for home care needs following discharge   - Consider OT consult to assist with ADL evaluation and planning for discharge  - Provide patient education as appropriate  Outcome: Progressing  Goal: Maintains/Returns to pre admission functional level  Description: INTERVENTIONS:  - Perform BMAT or MOVE assessment daily    - Set and communicate daily mobility goal to care team and patient/family/caregiver  - Collaborate with rehabilitation services on mobility goals if consulted  - Perform Range of Motion 3-4 times a day  - Reposition patient every 2 hours    - Dangle patient 3 times a day  - Stand patient 3 times a day  - Ambulate patient 3 times a day  - Out of bed to chair 3 times a day   - Out of bed for meals 3 times a day  - Out of bed for toileting  - Record patient progress and toleration of activity level   Outcome: Progressing  Goal: Patient will remain free of falls  Description: INTERVENTIONS:  - Educate patient/family on patient safety including physical limitations  - Instruct patient to call for assistance with activity   - Consult OT/PT to assist with strengthening/mobility   - Keep Call bell within reach  - Keep bed low and locked with side rails adjusted as appropriate  - Keep care items and personal belongings within reach  - Initiate and maintain comfort rounds  - Make Fall Risk Sign visible to staff  - Offer Toileting every 2 Hours, in advance of need  - Initiate/Maintain bed/ chair alarm  - Apply yellow socks and bracelet for high fall risk patients  - Consider moving patient to room near nurses station  Outcome: Progressing     Problem: DISCHARGE PLANNING  Goal: Discharge to home or other facility with appropriate resources  Description: INTERVENTIONS:  - Identify barriers to discharge w/patient and caregiver  - Arrange for needed discharge resources and transportation as appropriate  - Identify discharge learning needs (meds, wound care, etc )  - Arrange for interpretive services to assist at discharge as needed  - Refer to Case Management Department for coordinating discharge planning if the patient needs post-hospital services based on physician/advanced practitioner order or complex needs related to functional status, cognitive ability, or social support system  Outcome: Progressing     Problem: Knowledge Deficit  Goal: Patient/family/caregiver demonstrates understanding of disease process, treatment plan, medications, and discharge instructions  Description: Complete learning assessment and assess knowledge base  Interventions:  - Provide teaching at level of understanding  - Provide teaching via preferred learning methods  Outcome: Progressing     Problem: Nutrition/Hydration-ADULT  Goal: Nutrient/Hydration intake appropriate for improving, restoring or maintaining nutritional needs  Description: Monitor and assess patient's nutrition/hydration status for malnutrition  Collaborate with interdisciplinary team and initiate plan and interventions as ordered  Monitor patient's weight and dietary intake as ordered or per policy  Utilize nutrition screening tool and intervene as necessary  Determine patient's food preferences and provide high-protein, high-caloric foods as appropriate       INTERVENTIONS:  - Monitor oral intake, urinary output, labs, and treatment plans  - Assess nutrition and hydration status and recommend course of action  - Evaluate amount of meals eaten  - Assist patient with eating if necessary   - Allow adequate time for meals  - Recommend/ encourage appropriate diets, oral nutritional supplements, and vitamin/mineral supplements  - Order, calculate, and assess calorie counts as needed  - Recommend, monitor, and adjust tube feedings and TPN/PPN based on assessed needs  - Assess need for intravenous fluids  - Provide specific nutrition/hydration education as appropriate  - Include patient/family/caregiver in decisions related to nutrition  Outcome: Progressing

## 2023-06-04 NOTE — PROGRESS NOTES
"[address] [facility] [address] [place]  Progress Note  Name: Germaine Berkowitz  MRN: [id number]  Unit/Bed#: 728-60 I Date of Admission: 5/29/2023   Date of Service: 6/4/2023  Hospital Day: 6    Assessment/Plan   Hematuria  Assessment & Plan  Patient reports bright red blood in urine yesterday, now reported pink tinged urine  Per patient has cystoscopy scheduled Tuesday with [facility]  Patient reports cystoscopy is being done because she has \"spots that could be cancer\"  On review of urology notes, diagnosis for cytoscopy is microscopic hematuria and kidney stones  Discussed discharge plans with patient  She has appt scheduled tomorrow at [facility] for preop with outpatient cysto planned for Tuesday in [facility]  She does not feel comfortable being discharged today with the \"new development of pink tinged urine\"  She is aware that the cysto will likely not be performed at this campus and it may end up being delayed if she feels she is not feeling well enough to be discharged home today  Consult placed to urology    Seizure [facility])  Assessment & Plan  · Ativan as needed  · Not on any other seizure medications at home  · EEG completed  Patient had one of her typical events  No epileptic activity associated with the event  Consistent with psychogenic seizures  · Recommend outpatient therapy  · Patient states she still does not feel \"back to normal\" since her recent event       Decreased oral intake  Assessment & Plan  Decreased p o  intake for the past week due to persistent vomiting and diarrhea    · Patient is not tolerating diet, all her meals are brought from home by her  due to her reported corn allergy  · Continue IV fluids    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[address] )  Assessment & Plan  · Follows with mast cell activation specialist at the [facility]  · Continue Benadryl 50 mg IM twice daily, Protonix 40 mg twice daily, pepcid twice daily  · Monitor symptoms    Hypothyroidism (acquired)  Assessment & " "Plan  · TSH WNL 1 889  · Continue levothyroxine 100 mcg daily    SIRS (systemic inflammatory response syndrome) (HCC)  Assessment & Plan  SIRS, resolved, possibly due to Crohn's disease/Viral gastroenteritis; presented with WBC 13 25 and   · Stool cultures negative  · treated with IVF, IV Medrol, IV Protonix,   · GI input appreciated  · Monitor VS, CBC  · WBC today 14 39 down from 16 06, likely elevated due to IV steroids            Vomiting and diarrhea  Assessment & Plan  Presented to ED with ongoing nonbloody nonbilious emesis and nonbloody diarrhea since EGD colonoscopy on 5/2/2023  · Reported this had been worsening over the past week  · Vomiting improved with IV Zofran-continue p o  as needed  Diarrhea appears resolved  · Stool C  difficile and PCR negative  · Received 1 5 L normal saline bolus in ED, continued normal saline 100 cc/h for maintenance - discontinued last night  Patient concerned because she feels dehydrated and states she has \"poor IV access\"  Will resume IV fluids at 75cc/hr for now  Patient states she still \"feels dry\"  Admits her dietary choices here in the hospital are limited due to her multiple food allergies  · Met SIRS criteria on admission likely due to Crohn's vs viral gastroenteritis and resovled  · Replace electrolytes as needed  Mag and K normal today      * Crohn's disease of colon with complication (HCC)  Assessment & Plan  · Stable, no bloody diarrhea  · On Stelara and omalizumab infusion treatment with IV hydration-next scheduled on 5/30/2023  · Patient missed all infusions this past week  · She reports taking budesonide 3 mg 3 times daily at home  She brought her home medication which she has been taking and is now discontinued  · Was re-started on IV steroids yesterday  GI had recommended her to resume her PO steroids yesterday but patient did not have her  bring her meds from home and she is unable to take meds supplied here due to corn allergy    She is " "scheduled for appt in infusion center tomorrow for  infusion of NS, steroids, protonix, pepcid and benadryl in anticipation of her cysto on Tuesday  VTE Pharmacologic Prophylaxis:   Pharmacologic: Pharmacologic VTE Prophylaxis contraindicated due to hematuria  Mechanical VTE Prophylaxis in Place: No    Patient Centered Rounds: I have performed bedside rounds with nursing staff today  Education and Discussions with Family / Patient: patient updated    Time Spent for Care: 45 minutes  More than 50% of total time spent on counseling and coordination of care as described above  Current Length of Stay: 6 day(s)    Current Patient Status: Inpatient   Certification Statement: The patient will continue to require additional inpatient hospital stay due to patient still receiving IV fluids    Discharge Plan: home when ready    Code Status: Level 1 - Full Code      Subjective:   46year old female initially admitted with Crohns flare up  She reports a \"seizure episode\" for which she had an EEG on   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  She states she is still not \"back to normal\"  She is very anxious about having her IV fluids discontinued  She is very concerned about her potassium and magnesium even though they are both normal  She states \"the only reason they are good is because of the IV and she could \"crash\" if the IV is stopped  Objective:     Vitals:   Temp (24hrs), Av 7 °F (37 1 °C), Min:98 6 °F (37 °C), Max:98 7 °F (37 1 °C)    Temp:  [98 6 °F (37 °C)-98 7 °F (37 1 °C)] 98 6 °F (37 °C)  HR:  [87] 87  Resp:  [15] 15  BP: (135-138)/(76-88) 135/88  SpO2:  [96 %] 96 %  Body mass index is 25 06 kg/m²  Input and Output Summary (last 24 hours): Intake/Output Summary (Last 24 hours) at 2023 1021  Last data filed at 2023 0940  Gross per 24 hour   Intake --   Output 800 ml   Net -800 ml       Physical Exam:     Physical Exam  Vitals reviewed     Constitutional:      " General: She is not in acute distress  Appearance: She is not ill-appearing or diaphoretic  HENT:      Head: Normocephalic and atraumatic  Nose: Nose normal       Mouth/Throat:      Mouth: Mucous membranes are moist       Pharynx: Oropharynx is clear  Eyes:      Conjunctiva/sclera: Conjunctivae normal    Cardiovascular:      Rate and Rhythm: Normal rate  Pulmonary:      Effort: Pulmonary effort is normal  No respiratory distress  Abdominal:      General: Bowel sounds are normal  There is no distension  Palpations: Abdomen is soft  Musculoskeletal:         General: No deformity or signs of injury  Skin:     General: Skin is warm and dry  Neurological:      Mental Status: She is alert and oriented to person, place, and time  Mental status is at baseline  Psychiatric:         Mood and Affect: Mood normal          Behavior: Behavior normal            Additional Data:     Labs:    Results from last 7 days   Lab Units 06/04/23  0617   EOS PCT % 0   HEMATOCRIT % 40 3   HEMOGLOBIN g/dL 13 3   LYMPHS PCT % 12*   MONOS PCT % 7   NEUTROS PCT % 79*   PLATELETS Thousands/uL 517*   WBC Thousand/uL 14 39*     Results from last 7 days   Lab Units 06/04/23  0617   ANION GAP mmol/L 7   ALBUMIN g/dL 3 3*   ALK PHOS U/L 53   ALT U/L 38   AST U/L 16   BUN mg/dL 9   CALCIUM mg/dL 7 6*   CHLORIDE mmol/L 104   CO2 mmol/L 29   CREATININE mg/dL 0 61   GLUCOSE RANDOM mg/dL 131   POTASSIUM mmol/L 3 5   SODIUM mmol/L 140   TOTAL BILIRUBIN mg/dL 0 20                 Results from last 7 days   Lab Units 05/30/23  0504   PROCALCITONIN ng/ml <0 05           * I Have Reviewed All Lab Data Listed Above  * Additional Pertinent Lab Tests Reviewed:  All [facility] Admission Reviewed        Recent Cultures (last 7 days):     Results from last 7 days   Lab Units 05/29/23  2234   C DIFF TOXIN B BY PCR  Negative       Last 24 Hours Medication List:   Current Facility-Administered Medications   Medication Dose Route Frequency Provider Last Rate   • acetaminophen  650 mg Oral Q6H PRN Joanne Shadow, DO     • albuterol  2 puff Inhalation Q4H PRN Joanne Shadow, DO     • butalbital-acetaminophen-caffeine  1 tablet Oral Q8H PRN Joanne Shadow, DO     • cholecalciferol  1,000 Units Oral BID Joanne Shadow, DO     • diphenhydrAMINE  50 mg Intramuscular Q6H Baldemar Quiles MD     • diphenhydrAMINE  50 mg Intravenous Q6H PRN Bernida Aschoff, PA-C     • famotidine  20 mg Intravenous Q12H [facility] Bernida Aschoff, PA-C     • HYDROmorphone  1 mg Intravenous Q4H PRN Bernida Aschoff, PA-C     • levothyroxine  100 mcg Oral Early Morning Betzy Grigsby, DO     • LORazepam  1 mg Intravenous Q4H PRN Flor Tripp, DO     • methylPREDNISolone sodium succinate  20 mg Intravenous Q8H [facility] Maggie Anderson PA-C     • ondansetron  4 mg Intravenous Q6H PRN Baldemar Quiles MD     • pantoprazole  40 mg Intravenous Q24H [facility] Bernida Aschoff, PA-C     • potassium-sodium phosphateS  1 tablet Oral TID With Meals Baldemar Quiles MD     • predniSONE  20 mg Oral Daily PRN Joanne Shadow, DO     • sodium chloride  75 mL/hr Intravenous Continuous Maggie Anderson PA-C 75 mL/hr (06/04/23 0015)        Today, Patient Was Seen By: Leander Geiger PA-C    ** Please Note: Dictation voice to text software may have been used in the creation of this document   **

## 2023-06-05 ENCOUNTER — HOSPITAL ENCOUNTER (OUTPATIENT)
Dept: INFUSION CENTER | Facility: HOSPITAL | Age: 48
Discharge: HOME | End: 2023-06-05
Attending: INTERNAL MEDICINE

## 2023-06-05 ENCOUNTER — TELEPHONE (OUTPATIENT)
Dept: OTHER | Facility: HOSPITAL | Age: 48
End: 2023-06-05

## 2023-06-05 LAB
ANION GAP SERPL CALCULATED.3IONS-SCNC: 7 MMOL/L (ref 4–13)
BACTERIA UR QL AUTO: NORMAL /HPF
BILIRUB UR QL STRIP: NEGATIVE
BUN SERPL-MCNC: 15 MG/DL (ref 5–25)
CALCIUM SERPL-MCNC: 7.7 MG/DL (ref 8.4–10.2)
CHLORIDE SERPL-SCNC: 102 MMOL/L (ref 96–108)
CLARITY UR: CLEAR
CO2 SERPL-SCNC: 29 MMOL/L (ref 21–32)
COLOR UR: NORMAL
CREAT SERPL-MCNC: 0.65 MG/DL (ref 0.6–1.3)
ERYTHROCYTE [DISTWIDTH] IN BLOOD BY AUTOMATED COUNT: 14.5 % (ref 11.6–15.1)
GFR SERPL CREATININE-BSD FRML MDRD: 103 ML/MIN/1.73SQ M
GLUCOSE SERPL-MCNC: 175 MG/DL (ref 65–140)
GLUCOSE UR STRIP-MCNC: NEGATIVE MG/DL
HCT VFR BLD AUTO: 37.6 % (ref 34.8–46.1)
HGB BLD-MCNC: 12.4 G/DL (ref 11.5–15.4)
HGB UR QL STRIP.AUTO: NEGATIVE
KETONES UR STRIP-MCNC: NEGATIVE MG/DL
LEUKOCYTE ESTERASE UR QL STRIP: NEGATIVE
MAGNESIUM SERPL-MCNC: 1.9 MG/DL (ref 1.9–2.7)
MCH RBC QN AUTO: 30.6 PG (ref 26.8–34.3)
MCHC RBC AUTO-ENTMCNC: 33 G/DL (ref 31.4–37.4)
MCV RBC AUTO: 93 FL (ref 82–98)
NITRITE UR QL STRIP: NEGATIVE
NON-SQ EPI CELLS URNS QL MICRO: NORMAL /HPF
PH UR STRIP.AUTO: 7.5 [PH]
PLATELET # BLD AUTO: 497 THOUSANDS/UL (ref 149–390)
PMV BLD AUTO: 8.7 FL (ref 8.9–12.7)
POTASSIUM SERPL-SCNC: 3.7 MMOL/L (ref 3.5–5.3)
PROT UR STRIP-MCNC: NEGATIVE MG/DL
RBC # BLD AUTO: 4.05 MILLION/UL (ref 3.81–5.12)
RBC #/AREA URNS AUTO: NORMAL /HPF
SODIUM SERPL-SCNC: 138 MMOL/L (ref 135–147)
SP GR UR STRIP.AUTO: 1.01 (ref 1–1.03)
UROBILINOGEN UR QL STRIP.AUTO: 0.2 E.U./DL
WBC # BLD AUTO: 18.91 THOUSAND/UL (ref 4.31–10.16)
WBC #/AREA URNS AUTO: NORMAL /HPF

## 2023-06-05 PROCEDURE — 85027 COMPLETE CBC AUTOMATED: CPT | Performed by: PHYSICIAN ASSISTANT

## 2023-06-05 PROCEDURE — 83735 ASSAY OF MAGNESIUM: CPT | Performed by: PHYSICIAN ASSISTANT

## 2023-06-05 PROCEDURE — 81001 URINALYSIS AUTO W/SCOPE: CPT | Performed by: HOSPITALIST

## 2023-06-05 PROCEDURE — C9113 INJ PANTOPRAZOLE SODIUM, VIA: HCPCS | Performed by: PHYSICIAN ASSISTANT

## 2023-06-05 PROCEDURE — NC001 PR NO CHARGE

## 2023-06-05 PROCEDURE — 99222 1ST HOSP IP/OBS MODERATE 55: CPT

## 2023-06-05 PROCEDURE — 99232 SBSQ HOSP IP/OBS MODERATE 35: CPT | Performed by: HOSPITALIST

## 2023-06-05 PROCEDURE — 80048 BASIC METABOLIC PNL TOTAL CA: CPT | Performed by: PHYSICIAN ASSISTANT

## 2023-06-05 RX ADMIN — ONDANSETRON 4 MG: 2 INJECTION INTRAMUSCULAR; INTRAVENOUS at 00:33

## 2023-06-05 RX ADMIN — DIPHENHYDRAMINE HYDROCHLORIDE 50 MG: 50 INJECTION, SOLUTION INTRAMUSCULAR; INTRAVENOUS at 18:17

## 2023-06-05 RX ADMIN — LORAZEPAM 1 MG: 2 INJECTION INTRAMUSCULAR; INTRAVENOUS at 03:12

## 2023-06-05 RX ADMIN — DIPHENHYDRAMINE HYDROCHLORIDE 50 MG: 50 INJECTION, SOLUTION INTRAMUSCULAR; INTRAVENOUS at 11:25

## 2023-06-05 RX ADMIN — HYDROMORPHONE HYDROCHLORIDE 1 MG: 1 INJECTION, SOLUTION INTRAMUSCULAR; INTRAVENOUS; SUBCUTANEOUS at 14:42

## 2023-06-05 RX ADMIN — ONDANSETRON 4 MG: 2 INJECTION INTRAMUSCULAR; INTRAVENOUS at 18:17

## 2023-06-05 RX ADMIN — SODIUM CHLORIDE 75 ML/HR: 0.9 INJECTION, SOLUTION INTRAVENOUS at 03:12

## 2023-06-05 RX ADMIN — LORAZEPAM 1 MG: 2 INJECTION INTRAMUSCULAR; INTRAVENOUS at 11:37

## 2023-06-05 RX ADMIN — LORAZEPAM 1 MG: 2 INJECTION INTRAMUSCULAR; INTRAVENOUS at 18:17

## 2023-06-05 RX ADMIN — HYDROMORPHONE HYDROCHLORIDE 1 MG: 1 INJECTION, SOLUTION INTRAMUSCULAR; INTRAVENOUS; SUBCUTANEOUS at 21:14

## 2023-06-05 RX ADMIN — HYDROMORPHONE HYDROCHLORIDE 1 MG: 1 INJECTION, SOLUTION INTRAMUSCULAR; INTRAVENOUS; SUBCUTANEOUS at 08:16

## 2023-06-05 RX ADMIN — METHYLPREDNISOLONE SODIUM SUCCINATE 20 MG: 40 INJECTION, POWDER, FOR SOLUTION INTRAMUSCULAR; INTRAVENOUS at 21:13

## 2023-06-05 RX ADMIN — FAMOTIDINE 20 MG: 10 INJECTION, SOLUTION INTRAVENOUS at 08:15

## 2023-06-05 RX ADMIN — FAMOTIDINE 20 MG: 10 INJECTION, SOLUTION INTRAVENOUS at 21:13

## 2023-06-05 RX ADMIN — PANTOPRAZOLE SODIUM 40 MG: 40 INJECTION, POWDER, FOR SOLUTION INTRAVENOUS at 08:15

## 2023-06-05 RX ADMIN — METHYLPREDNISOLONE SODIUM SUCCINATE 20 MG: 40 INJECTION, POWDER, FOR SOLUTION INTRAMUSCULAR; INTRAVENOUS at 05:54

## 2023-06-05 RX ADMIN — DIPHENHYDRAMINE HYDROCHLORIDE 50 MG: 50 INJECTION, SOLUTION INTRAMUSCULAR; INTRAVENOUS at 05:54

## 2023-06-05 RX ADMIN — HYDROMORPHONE HYDROCHLORIDE 1 MG: 1 INJECTION, SOLUTION INTRAMUSCULAR; INTRAVENOUS; SUBCUTANEOUS at 01:44

## 2023-06-05 RX ADMIN — LORAZEPAM 1 MG: 2 INJECTION INTRAMUSCULAR; INTRAVENOUS at 07:23

## 2023-06-05 RX ADMIN — DIPHENHYDRAMINE HYDROCHLORIDE 50 MG: 50 INJECTION, SOLUTION INTRAMUSCULAR; INTRAVENOUS at 00:33

## 2023-06-05 RX ADMIN — ONDANSETRON 4 MG: 2 INJECTION INTRAMUSCULAR; INTRAVENOUS at 11:22

## 2023-06-05 RX ADMIN — METHYLPREDNISOLONE SODIUM SUCCINATE 20 MG: 40 INJECTION, POWDER, FOR SOLUTION INTRAMUSCULAR; INTRAVENOUS at 14:42

## 2023-06-05 NOTE — CASE MANAGEMENT
Case Management Progress Note    Patient name Erlinda Salcedo  Location /397-24 MRN [id number]  : 1975 Date 2023       LOS (days): 7  Geometric Mean LOS (GMLOS) (days): 3 40  Days to GMLOS:-3 8        OBJECTIVE:        Current admission status: Inpatient  Preferred Pharmacy:   [address] [facility] [address]  [address] [facility]  [address]  [place] [address]  Phone: [phone] Fax: [phone] [address] [address]  Phone: [phone] Fax: [phone]    [facility] [address] [facility], [address]  [address]  Phone: [phone] Fax: [phone]    Primary Care Provider: Han Watkins DO    Primary Insurance: [address] [place] [address]  Secondary Insurance: MEDICARE    PROGRESS NOTE:pt still requiring inpatient stay  Waiting on urology to see if cystoscopy that was previously scheduled would be done inpatient or outpatient  No current discharge needs noted  CM to follow

## 2023-06-05 NOTE — PLAN OF CARE
Problem: MOBILITY - ADULT  Goal: Maintain or return to baseline ADL function  Description: INTERVENTIONS:  -  Assess patient's ability to carry out ADLs; assess patient's baseline for ADL function and identify physical deficits which impact ability to perform ADLs (bathing, care of mouth/teeth, toileting, grooming, dressing, etc )  - Assess/evaluate cause of self-care deficits   - Assess range of motion  - Assess patient's mobility; develop plan if impaired  - Assess patient's need for assistive devices and provide as appropriate  - Encourage maximum independence but intervene and supervise when necessary  - Involve family in performance of ADLs  - Assess for home care needs following discharge   - Consider OT consult to assist with ADL evaluation and planning for discharge  - Provide patient education as appropriate  Outcome: Progressing  Goal: Maintains/Returns to pre admission functional level  Description: INTERVENTIONS:  - Perform BMAT or MOVE assessment daily    - Set and communicate daily mobility goal to care team and patient/family/caregiver  - Collaborate with rehabilitation services on mobility goals if consulted  - Perform Range of Motion 3-4 times a day  - Reposition patient every 2 hours    - Dangle patient 3 times a day  - Stand patient 3 times a day  - Ambulate patient 3 times a day  - Out of bed to chair 3 times a day   - Out of bed for meals 3 times a day  - Out of bed for toileting  - Record patient progress and toleration of activity level   Outcome: Progressing     Problem: GASTROINTESTINAL - ADULT  Goal: Minimal or absence of nausea and/or vomiting  Description: INTERVENTIONS:  - Administer IV fluids if ordered to ensure adequate hydration  - Maintain NPO status until nausea and vomiting are resolved  - Nasogastric tube if ordered  - Administer ordered antiemetic medications as needed  - Provide nonpharmacologic comfort measures as appropriate  - Advance diet as tolerated, if ordered  - Consider nutrition services referral to assist patient with adequate nutrition and appropriate food choices  Outcome: Progressing  Goal: Maintains or returns to baseline bowel function  Description: INTERVENTIONS:  - Assess bowel function  - Encourage oral fluids to ensure adequate hydration  - Administer IV fluids if ordered to ensure adequate hydration  - Administer ordered medications as needed  - Encourage mobilization and activity  - Consider nutritional services referral to assist patient with adequate nutrition and appropriate food choices  Outcome: Progressing  Goal: Maintains adequate nutritional intake  Description: INTERVENTIONS:  - Monitor percentage of each meal consumed  - Identify factors contributing to decreased intake, treat as appropriate  - Assist with meals as needed  - Monitor I&O, weight, and lab values if indicated  - Obtain nutrition services referral as needed  Outcome: Progressing     Problem: METABOLIC, FLUID AND ELECTROLYTES - ADULT  Goal: Electrolytes maintained within normal limits  Description: INTERVENTIONS:  - Monitor labs and assess patient for signs and symptoms of electrolyte imbalances  - Administer electrolyte replacement as ordered  - Monitor response to electrolyte replacements, including repeat lab results as appropriate  - Instruct patient on fluid and nutrition as appropriate  Outcome: Progressing  Goal: Fluid balance maintained  Description: INTERVENTIONS:  - Monitor labs   - Monitor I/O and WT  - Instruct patient on fluid and nutrition as appropriate  - Assess for signs & symptoms of volume excess or deficit  Outcome: Progressing     Problem: PAIN - ADULT  Goal: Verbalizes/displays adequate comfort level or baseline comfort level  Description: Interventions:  - Encourage patient to monitor pain and request assistance  - Assess pain using appropriate pain scale  - Administer analgesics based on type and severity of pain and evaluate response  - Implement non-pharmacological measures as appropriate and evaluate response  - Consider cultural and social influences on pain and pain management  - Notify physician/advanced practitioner if interventions unsuccessful or patient reports new pain  Outcome: Progressing     Problem: INFECTION - ADULT  Goal: Absence or prevention of progression during hospitalization  Description: INTERVENTIONS:  - Assess and monitor for signs and symptoms of infection  - Monitor lab/diagnostic results  - Monitor all insertion sites, i e  indwelling lines, tubes, and drains  - Monitor endotracheal if appropriate and nasal secretions for changes in amount and color  - [place] appropriate cooling/warming therapies per order  - Administer medications as ordered  - Instruct and encourage patient and family to use good hand hygiene technique  - Identify and instruct in appropriate isolation precautions for identified infection/condition  Outcome: Progressing     Problem: SAFETY ADULT  Goal: Maintain or return to baseline ADL function  Description: INTERVENTIONS:  -  Assess patient's ability to carry out ADLs; assess patient's baseline for ADL function and identify physical deficits which impact ability to perform ADLs (bathing, care of mouth/teeth, toileting, grooming, dressing, etc )  - Assess/evaluate cause of self-care deficits   - Assess range of motion  - Assess patient's mobility; develop plan if impaired  - Assess patient's need for assistive devices and provide as appropriate  - Encourage maximum independence but intervene and supervise when necessary  - Involve family in performance of ADLs  - Assess for home care needs following discharge   - Consider OT consult to assist with ADL evaluation and planning for discharge  - Provide patient education as appropriate  Outcome: Progressing  Goal: Maintains/Returns to pre admission functional level  Description: INTERVENTIONS:  - Perform BMAT or MOVE assessment daily    - Set and communicate daily mobility goal to care team and patient/family/caregiver  - Collaborate with rehabilitation services on mobility goals if consulted  - Perform Range of Motion 3-4 times a day  - Reposition patient every 2 hours    - Dangle patient 3 times a day  - Stand patient 3 times a day  - Ambulate patient 3 times a day  - Out of bed to chair 3 times a day   - Out of bed for meals 3 times a day  - Out of bed for toileting  - Record patient progress and toleration of activity level   Outcome: Progressing  Goal: Patient will remain free of falls  Description: INTERVENTIONS:  - Educate patient/family on patient safety including physical limitations  - Instruct patient to call for assistance with activity   - Consult OT/PT to assist with strengthening/mobility   - Keep Call bell within reach  - Keep bed low and locked with side rails adjusted as appropriate  - Keep care items and personal belongings within reach  - Initiate and maintain comfort rounds  - Make Fall Risk Sign visible to staff  - Offer Toileting every 2 Hours, in advance of need  - Initiate/Maintain bed/ chair alarm  - Apply yellow socks and bracelet for high fall risk patients  - Consider moving patient to room near nurses station  Outcome: Progressing     Problem: DISCHARGE PLANNING  Goal: Discharge to home or other facility with appropriate resources  Description: INTERVENTIONS:  - Identify barriers to discharge w/patient and caregiver  - Arrange for needed discharge resources and transportation as appropriate  - Identify discharge learning needs (meds, wound care, etc )  - Arrange for interpretive services to assist at discharge as needed  - Refer to Case Management Department for coordinating discharge planning if the patient needs post-hospital services based on physician/advanced practitioner order or complex needs related to functional status, cognitive ability, or social support system  Outcome: Progressing     Problem: Knowledge Deficit  Goal: Patient/family/caregiver demonstrates understanding of disease process, treatment plan, medications, and discharge instructions  Description: Complete learning assessment and assess knowledge base  Interventions:  - Provide teaching at level of understanding  - Provide teaching via preferred learning methods  Outcome: Progressing     Problem: Nutrition/Hydration-ADULT  Goal: Nutrient/Hydration intake appropriate for improving, restoring or maintaining nutritional needs  Description: Monitor and assess patient's nutrition/hydration status for malnutrition  Collaborate with interdisciplinary team and initiate plan and interventions as ordered  Monitor patient's weight and dietary intake as ordered or per policy  Utilize nutrition screening tool and intervene as necessary  Determine patient's food preferences and provide high-protein, high-caloric foods as appropriate       INTERVENTIONS:  - Monitor oral intake, urinary output, labs, and treatment plans  - Assess nutrition and hydration status and recommend course of action  - Evaluate amount of meals eaten  - Assist patient with eating if necessary   - Allow adequate time for meals  - Recommend/ encourage appropriate diets, oral nutritional supplements, and vitamin/mineral supplements  - Order, calculate, and assess calorie counts as needed  - Recommend, monitor, and adjust tube feedings and TPN/PPN based on assessed needs  - Assess need for intravenous fluids  - Provide specific nutrition/hydration education as appropriate  - Include patient/family/caregiver in decisions related to nutrition  Outcome: Progressing

## 2023-06-05 NOTE — TELEPHONE ENCOUNTER
Two patient message/my chart messages ongoing on this patient  Most updated plan  Collaborated with Ruth BENDER seeing pt right now at [facility] and dr Everardo Ruiz at [facility]  No OR avail today or tomorrow  Her urine/hematuria has cleared up  She will be discharged home probably today  Her cystoscopy will NOT be performed this hospital stay at [facility]  She did not receive her mast cell prep so probably not tomorrow either  Please reschedule her with Dr Ramana Ponce another [facility] OR date

## 2023-06-05 NOTE — CONSULTS
Consultation - UROLOGY   Everette Lagunas 46 y o  female MRN: [id number]  Unit/Bed#: 439-00 Encounter: [id number]    Assessment/Plan     Assessment:  49-year-old female with a history of Crohn's disease with flareup present on admission (on Stelara and omalizumab infusion treatments as an outpatient) and also a history of mast cell activation syndrome and seizure disorder followed at [facility], she is known to Dr Layo Stein for history of bilateral non-obstructing nephrolithiasis on renal CT 1/26/23, chronic intermittent gross hematuria  She was noted to have a hypodensity in the upper pole of the right kidney, too small to characterize by CT  Urinary bladder without focal wall thickening or calculi  She apparently has missed her Stelara and omalizumab infusions over the past week prior to admission  She was admitted with bright red blood in the urine through the ED on June 3rd, reported pink-tinged and patient at the time was having symptoms of urinary frequency and straining with urination  She has not been on any anticoagulation  The patient was scheduled for outpatient cystoscopy to be performed at [address] [facility] tomorrow, Tuesday, June 6 with Dr Pita Nixon  The patient has a 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  She was to be premedicated with antihistamines and IV fluids prior to her cystoscopy procedure, she was supposed to be seen in the infusion center here as an outpatient today, but this is before her hospital admission  She apparently suffered an anaphylactic reaction, previous history of seizure, she had an EEG done here showed no epileptic activity  The patient was recommended outpatient follow-up with neurology  She is not currently on any seizure medications  Urinalysis today 6/5/2023 was entirely negative  CBC this morning showed a white count 18 91, she is on steroids    Hemoglobin 12 4  Platelets 224  BMP showed normal electrolytes, glucose 175  Creatinine normal 0 65  Magnesium normal 1 9    Recorded urine output yesterday 850 mL  Vital signs reviewed, patient remains afebrile  Blood pressure elevated 140/103 earlier this morning  SPO2 on room air 94%  Plan:    No plan for any inpatient urological intervention here at [address] [facility] [address] during this inpatient hospital admission  Discussed at length with Koki Garcia PA-C, with the urology service via [facility] text messaging  Also this provider sent a detailed Tiger text message to update Dr Mamie Christine  The patient said that she has been sending my chart messages to Dr Mamie Christine since last Thursday, now 4 days ago  She is waiting for a response and is asking if cystoscopy can be performed while she is here in the hospital   Personal inquire with Dr Petty Diss was conducted at the request of Koki Garcia, Dr Marcos Cervantes said he unavailable here at [facility] later in the week and that the patient can have her outpatient cystoscopy rescheduled with Dr Mamie Christine at his availability  At this time her cystoscopy in [facility], June 6, has been postponed  Koki Garcia contacted the urology , the urology service will contact the patient via telephone for recommendations and further disposition regarding rescheduling her outpatient procedure  Medical management and hospital discharge when medically optimal at the direction of the attending primary service  We will have nursing repeat her vital signs including blood pressure at this time  No plan for any urological intervention during her hospital admission here at this campus  History of Present Illness     HPI:  Yoselin Delgado is a 46 y o  female followed by Dr Milo Adorno with history of bilateral nonobstructing urinary calculi who was scheduled for outpatient cystoscopy tomorrow in [facility] with him, Tuesday, June 6, 2023    She has a complicated medical history of seizure disorder which she believes secondary to anaphylactic reaction from contrast dye, history of mast cell activation syndrome and Crohn's disease who has been hospitalized here since being evaluated in the emergency department on May 29th because of decreased oral intake, nonbilious vomiting and nonbloody diarrhea  Patient has a history of Crohn's disease, she had been on Stelara and omalizumab infusions which she missed prior to admission here  The patient had been on budesonide 3 mg 3 times a day but her  apparently did not initially bring the medication in  Patient was found to be hyponatremic  Patient was seen in gastroenterology consultation, her last EGD in May of this year was pathologically normal, colonoscopy showed mild patchy granular mucosa in the left colon and consistent with chronic active colitis  This admission her symptoms improved with IV steroids and then eventually transition to oral prednisolone  The patient reported bright red blood in her urine yesterday, Sunday, June 4, 2023  She admitted some urinary burning and frequency  The patient noted some pink-tinged urine which is now resolved  Denies any fever or chills  No suprapubic pain or tenderness  Urinalysis obtained today was entirely negative  Right now the patient is emotionally labile, crying  Her  is present in the room with her  She has a multitude of complaints unrelated to her urinary symptoms, her urinalysis is clean and she denies any blood in the urine at present  She was to have a scheduled outpatient cystoscopy with Dr Anthony Eddy tomorrow  Extensive amount of Tiger text conversation with the urology PA on call, Anabel Espana PA-C was conducted  Also personal discussion with Dr Augustine Fitzpatrick, urologist here today  Lastly extensive conversation was conducted with the attending hospitalist physician, Dr Wild Bellamy Counts    The patient said that she left a message on My Chart since last Thursday with   Smita Figueroa  The patient is asking if her cystoscopy can be conducted while she is hospitalized  The patient would need to be premedicated with IV fluids and antihistamines  At this time inpatient cystoscopy will not be conducted  The urology office will contact the patient personally according to Radha Ann to make arrangements to reschedule her outpatient cystoscopy  Inpatient consult to Urology  Consult performed by: Sandy Nagy PA-C  Consult ordered by: Estela Recinos PA-C          Review of Systems   Constitutional: Positive for appetite change  Negative for activity change, chills, diaphoresis, fatigue, fever and unexpected weight change  HENT: Negative  Eyes: Negative  Respiratory: Negative  Cardiovascular: Negative for chest pain, palpitations and leg swelling  Gastrointestinal: Negative for abdominal distention, abdominal pain, anal bleeding, blood in stool, constipation, diarrhea, nausea, rectal pain and vomiting  Endocrine: Negative  Genitourinary: Positive for dysuria, frequency and hematuria  Negative for decreased urine volume, difficulty urinating, dyspareunia, flank pain, pelvic pain, urgency, vaginal bleeding, vaginal discharge and vaginal pain  Musculoskeletal: Negative  Negative for back pain  Skin: Negative  Allergic/Immunologic: Negative  Neurological: Negative  Hematological: Negative  Does not bruise/bleed easily  Psychiatric/Behavioral: The patient is nervous/anxious           Historical Information   Past Medical History:   Diagnosis Date   • Anemia 2007   • Anxiety    • Asthma    • Bile duct stricture 2014    Sphincter of oddi dysfunction   • Chronic kidney disease    • Colon polyp 1998   • Crohn's colitis ([facility] [address] )    • [facility]    • Disease of thyroid gland    • 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   • Generalized anxiety disorder 08/26/2017   • GERD (gastroesophageal reflux disease) 1995   • GI (gastrointestinal bleed) 1994   • Heart murmur    • Inflammatory bowel disease    • Irritable bowel syndrome 2016   • Lactose intolerance 1982   • Mast cell disease    • Migraine    • Myocardial infarction [facility])     NSTEMI  pt denies, documented in cardio note   • Pancreatitis     sphinger of    • Psychiatric disorder    • RA (rheumatoid arthritis) ([facility] [address] )    • Seizures ([facility] [address] )    • Ulcerative colitis ([facility] [address] )    • Visual impairment      Past Surgical History:   Procedure Laterality Date   • ABDOMINAL SURGERY  2017   • APPENDECTOMY     • CHOLECYSTECTOMY     • COLONOSCOPY  2019   • EGD AND COLONOSCOPY N/A 09/12/2017    Procedure: EGD AND COLONOSCOPY;  Surgeon: Juventino To MD;  Location:  GI LAB; Service: Gastroenterology   • ERCP N/A 09/15/2017    Procedure: ENDOSCOPIC RETROGRADE CHOLANGIOPANCREATOGRAPHY (ERCP); Surgeon: Dea Vasquez MD;  Location:  GI LAB;   Service: Gastroenterology   • HYSTERECTOMY     • KNEE SURGERY Right    • LAPAROSCOPIC ENDOMETRIOSIS FULGURATION     • ORIF ANKLE FRACTURE Left    • NY ARTHRS KNE SURG W/MENISCECTOMY MED/LAT W/SHVG Left 05/12/2017    Procedure: POSSIBLE MEDIAL MENISECTOMY;  Surgeon: Phong Hebert MD;  Location: [facility] MAIN OR;  Service: Orthopedics   • NY ARTHRS KNEE DEBRIDEMENT/SHAVING ARTCLR CRTLG Left 05/12/2017    Procedure: KNEE ARTHROSCOPY EVALUATION, CHONDROPLASTY;  Surgeon: Phong Hebert MD;  Location: [facility];  Service: Orthopedics   • NY LAPS ABD PRTM&OMENTUM DX W/WO SPEC BR/WA SPX N/A 12/12/2017    Procedure: LAPAROSCOPY DIAGNOSTIC  WITH LYSIS OF ADHESIONS;  Surgeon: Tor Reynolds DO;  Location:  MAIN OR;  Service: General   • UPPER GASTROINTESTINAL ENDOSCOPY  2019     Social History   Social History     Substance and Sexual Activity   Alcohol Use Never     Social History     Substance and Sexual Activity   Drug Use Never     E-Cigarette/Vaping   • E-Cigarette Use Never User      E-Cigarette/Vaping Substances   • Nicotine No    • THC No    • CBD No    • Flavoring No    • Other No    • Unknown No      Social History     Tobacco Use   Smoking Status Never   Smokeless Tobacco Never     Family History: non-contributory    Meds/Allergies   current meds:   Current Facility-Administered Medications   Medication Dose Route Frequency   • acetaminophen (TYLENOL) tablet 650 mg  650 mg Oral Q6H PRN   • albuterol (PROVENTIL HFA,VENTOLIN HFA) inhaler 2 puff  2 puff Inhalation Q4H PRN   • butalbital-acetaminophen-caffeine (FIORICET,ESGIC) -40 mg per tablet 1 tablet  1 tablet Oral Q8H PRN   • cholecalciferol (VITAMIN D) oral liquid 1,000 Units  1,000 Units Oral BID   • diphenhydrAMINE (BENADRYL) injection 50 mg  50 mg Intramuscular Q6H   • diphenhydrAMINE (BENADRYL) injection 50 mg  50 mg Intravenous Q6H PRN   • Famotidine (PF) (PEPCID) injection 20 mg  20 mg Intravenous Q12H [address]   • HYDROmorphone (DILAUDID) injection 1 mg  1 mg Intravenous Q4H PRN   • levothyroxine tablet 100 mcg  100 mcg Oral Early Morning   • LORazepam (ATIVAN) injection 1 mg  1 mg Intravenous Q4H PRN   • methylPREDNISolone sodium succinate (Solu-MEDROL) injection 20 mg  20 mg Intravenous Q8H [address]   • ondansetron (ZOFRAN) injection 4 mg  4 mg Intravenous Q6H PRN   • pantoprazole (PROTONIX) injection 40 mg  40 mg Intravenous Q24H KARISSA   • potassium-sodium phosphateS (K-PHOS,PHOSPHA 250) -250 mg tablet 1 tablet  1 tablet Oral TID With Meals   • predniSONE tablet 20 mg  20 mg Oral Daily PRN   • sodium chloride 0 9 % infusion  75 mL/hr Intravenous Continuous     Allergies   Allergen Reactions   • Aimovig [Erenumab-Aooe] Anaphylaxis   • Amitriptyline Anaphylaxis     According to the patient she had nphylaxis   • Aspergillus Allergy Skin Test Other (See Comments), Hives and Swelling   • Azathioprine Other (See Comments) and Anaphylaxis     pancreatitis  According to patient she needed Epipen   • Banana - Food Allergy Itching, Swelling and Anaphylaxis   • Buspar [Buspirone] Hives and Shortness Of Breath   • Citric Acid-Polysorbate 80 "Abdominal Pain, Anaphylaxis, Anxiety, Bradycardia, Diarrhea, Fatigue, GI Intolerance, Headache, Hives, Hyperactivity, Itching, Lip Swelling, Nausea Only, Palpitations, Rash, Shortness Of Breath, Tachycardia, Throat Swelling, Tongue Swelling and Vomiting   • Corn Dextrin Anaphylaxis     Any corn based products   • Eggs Or Egg-Derived Products - Food Allergy Anaphylaxis   • Epinephrine Anaphylaxis   • Erenumab Anaphylaxis     patient sent portal message stating she had anaphylaxis  patient sent portal message stating she had anaphylaxis     • Gadolinium Abdominal Pain, Anaphylaxis, Anxiety, Confusion, Delirium, Dizziness, Eye Swelling, Fatigue, GI Intolerance, Headache, Hives, Irritability, Itching, Lip Swelling, Nausea Only, Palpitations, Photosensitivity, Rash, Seizures, Shortness Of Breath, Swelling, Syncope, Throat Swelling, Tongue Swelling, Tremor, Visual Disturbance and Vomiting   • Gelatin - Food Allergy Anaphylaxis   • Heparin Hives     Painful welts    • Lactated Ringers Shortness Of Breath     Pt questions this allergy, pt has received LR multiple times without reaction   • Lavender Oil Anaphylaxis     Other reaction(s): anaphalxis   • Malto Dextrin [Dextrin] Anaphylaxis   • Other Anaphylaxis     Gel caps, maltodextrose, dextrose,grapes, lavender    • Penicillium Notatum Shortness Of Breath and Swelling   • Sumatriptan Anaphylaxis     Bradycardia, sob, back pressure, head pain,throat tightness  According to the patient she had anphylaxis   • Ustekinumab Anaphylaxis   • Contrast [Iodinated Contrast Media] Other (See Comments)     Pt states needs to be premedicated prior to injection   • Corn Oil - Food Allergy - Food Allergy Hives   • Humira [Adalimumab] Other (See Comments)     \"I develop drug induced lupus\"   • Ibuprofen Hives and Throat Swelling   • Polyethylene Glycol Diarrhea and Vomiting   • Red Dye - Food Allergy Other (See Comments)     intolerance   • Remicade [Infliximab] Other (See Comments)     \"I " "develop drug induced lupus\"   • Soy Allergy - Food Allergy Other (See Comments)   • Sulfa Antibiotics Hives and Other (See Comments)   • Sulfate Hives   • Sulfites - Food Allergy Hives   • Chlorhexidine Itching   • Freederm Adhesive Remover [New Skin] Rash   • Histamine Hives, Rash and Other (See Comments)     Intolerance         Objective   First Vitals:   Blood Pressure: 150/98 (05/29/23 0216)  Pulse: (!) 106 (05/29/23 0216)  Temperature: 98 4 °F (36 9 °C) (05/29/23 0216)  Temp Source: Temporal (05/29/23 0216)  Respirations: 18 (05/29/23 0216)  Height: 5' 0 5\" (153 7 cm) (05/29/23 0216)  Weight - Scale: 67 1 kg (148 lb) (05/29/23 0216)  SpO2: 96 % (05/29/23 0216)    Current Vitals:   Blood Pressure: (!) 140/103 (06/05/23 1507)  Pulse: (!) 114 (06/05/23 1507)  Temperature: 98 6 °F (37 °C) (06/05/23 1507)  Temp Source: Oral (06/04/23 1421)  Respirations: 20 (06/05/23 1507)  Height: 5' 5\" (165 1 cm) (05/29/23 1054)  Weight - Scale: 70 3 kg (154 lb 15 7 oz) (06/05/23 0600)  SpO2: 94 % (06/05/23 1507)      Intake/Output Summary (Last 24 hours) at 6/5/2023 1526  Last data filed at 6/5/2023 1304  Gross per 24 hour   Intake 480 ml   Output 450 ml   Net 30 ml       Invasive Devices     Peripheral Intravenous Line  Duration           Peripheral IV 06/02/23 Right;Ventral (anterior) Forearm 3 days                Physical Exam     BP (!) 140/103   Pulse (!) 114   Temp 98 6 °F (37 °C)   Resp 20   Ht 5' 5\" (1 651 m)   Wt 70 3 kg (154 lb 15 7 oz)   SpO2 94%   BMI 25 79 kg/m²       She is emotionally labile, crying  Her  is present in the room here with her  The patient is not toxic or in acute distress  Skin without pallor or jaundice  Warm and dry to touch  ENT clear  Sclera white  Pharynx noninflamed  Neck no adenopathy or goiter  Chest no respiratory distress  Good inspiratory effort  Heart RRR  No murmur or gallop  Lungs CTA  Abdomen positive bowel sounds, soft    No guarding or " "rebound  Extremities no clubbing, no cyanosis peripheral edema  Ambulation was not witnessed  No focal motor or sensory neurologic weakness is apparent  Neurological exam shows cranial nerves intact  She is fully oriented  Speech is clear  No tremor noted  Mental status seems appropriate, she can be tangential in her train of thought, seems anxious in her demeanor  Lab Results:   I have personally reviewed pertinent lab results    , CBC:   Lab Results   Component Value Date    HCT 37 6 06/05/2023    HGB 12 4 06/05/2023    MCH 30 6 06/05/2023    MCHC 33 0 06/05/2023    MCV 93 06/05/2023    MPV 8 7 (L) 06/05/2023     (H) 06/05/2023    RBC 4 05 06/05/2023    RDW 14 5 06/05/2023    WBC 18 91 (H) 06/05/2023   , CMP:   Lab Results   Component Value Date    BUN 15 06/05/2023    CALCIUM 7 7 (L) 06/05/2023     06/05/2023    CO2 29 06/05/2023    CREATININE 0 65 06/05/2023    EGFR 103 06/05/2023    K 3 7 06/05/2023    SODIUM 138 06/05/2023   , Coagulation: No results found for: \"APTT\", \"INR\", \"PT\", Lipase: No results found for: \"LIPASE\"     Urinalysis with microscopic  Order: [id number]   Status: Final result      Visible to patient: Yes (seen)      Next appt: 06/07/2023 at 12:00 PM in Infusion Therapy ([facility])      0 Result Notes             Component Ref Range & Units 6/5/23 1216 6/1/23 1529 3/5/23 1420 2/15/23 1652 10/5/22 1056 9/27/22 1253 9/27/22 1253   Color, UA  Light Yellow  Yellow  Yellow  yellow  YELLOW   Yellow    Clarity, UA  Clear  Clear  Clear  clear  CLEAR   Clear    Specific [place], UA 1 003 - 1 030 1 010  1 015  1 020  1 020 R  1 020 R   <=1 005    pH, UA 4 5, 5 0, 5 5, 6 0, 6 5, 7 0, 7 5, 8 0 7 5  6 5  6 0  5 5 R  6 0 R   6 5    Leukocytes, UA Negative Negative  Negative  Negative  neg R  N R   Negative    Nitrite, UA Negative Negative  Negative  Negative  neg R  N R   Negative    Protein, UA Negative mg/dl Negative  Negative  Negative  neg R  N R   Negative    Glucose, UA " Negative mg/dl Negative  100 (1/10%) Abnormal   250 (1/4%) Abnormal   neg R  N R   100 (1/10%) Abnormal     Ketones, UA Negative mg/dl Negative  Negative  Negative  neg R  N R   Negative    Urobilinogen, UA 0 2, 1 0 E U /dl E U /dl 0 2  0 2  0 2  0 2 R  0 2 R   0 2    Bilirubin, UA Negative Negative  Negative  Negative     Negative    Occult Blood, UA Negative Negative  Negative  Small Abnormal   small R  N R   Small Abnormal     RBC, UA None Seen, 2-4 /hpf None Seen   2-4    2-4 R     WBC, UA None Seen, 2-4, 5-60 /hpf None Seen   1-2 Abnormal     1-2 R     Epithelial Cells None Seen, Occasional /hpf None Seen   Occasional    Occasional     Bacteria, UA None Seen, Occasional /hpf None Seen   Occasional    Occasional     BILIRUBIN,UA     neg  TR                 Specimen Collected: 06/05/23 12:16 Last Resulted: 06/05/23 12:53             Imaging: I have personally reviewed pertinent reports  EKG, Pathology, and Other Studies: I have personally reviewed pertinent reports  Counseling / Coordination of Care  Total floor / unit time spent today 70 minutes  Greater than 50% of total time was spent with the patient and / or family counseling and / or coordination of care  A description of the counseling / coordination of care:  Extensive amount of time conducted by this provider in the coordination of care including lengthy discussion with the attending hospitalist, urology ADONIS on-call, and Tiger text messaging with Dr Tesha Cartwright plus personal conversation with Dr Kin Bryan here, in addition reviewed the patient's complex medical history, personal examination the patient and amount of time spent with personal counseling and explanation of planned urological management all explained in detail to the patient requiring an excessive amount of time      Aron Flowers PA-C

## 2023-06-05 NOTE — H&P (VIEW-ONLY)
Consultation - UROLOGY   Reji Davila 46 y o  female MRN: [id number]  Unit/Bed#: 068-09 Encounter: [id number]    Assessment/Plan     Assessment:  13-year-old female with a history of Crohn's disease with flareup present on admission (on Stelara and omalizumab infusion treatments as an outpatient) and also a history of mast cell activation syndrome and seizure disorder followed at [facility], she is known to Dr Sly Berman for history of bilateral non-obstructing nephrolithiasis on renal CT 1/26/23, chronic intermittent gross hematuria  She was noted to have a hypodensity in the upper pole of the right kidney, too small to characterize by CT  Urinary bladder without focal wall thickening or calculi  She apparently has missed her Stelara and omalizumab infusions over the past week prior to admission  She was admitted with bright red blood in the urine through the ED on June 3rd, reported pink-tinged and patient at the time was having symptoms of urinary frequency and straining with urination  She has not been on any anticoagulation  The patient was scheduled for outpatient cystoscopy to be performed at [address] [place] [address],[address] tomorrow, Tuesday, June 6 with Dr Kenyetta Bolton  The patient has a 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  She was to be premedicated with antihistamines and IV fluids prior to her cystoscopy procedure, she was supposed to be seen in the infusion center here as an outpatient today, but this is before her hospital admission  She apparently suffered an anaphylactic reaction, previous history of seizure, she had an EEG done here showed no epileptic activity  The patient was recommended outpatient follow-up with neurology  She is not currently on any seizure medications  Urinalysis today 6/5/2023 was entirely negative  CBC this morning showed a white count 18 91, she is on steroids    Hemoglobin 12 4  Platelets 424  BMP showed normal electrolytes, glucose 175  Creatinine normal 0 65  Magnesium normal 1 9    Recorded urine output yesterday 850 mL  Vital signs reviewed, patient remains afebrile  Blood pressure elevated 140/103 earlier this morning  SPO2 on room air 94%  Plan:    No plan for any inpatient urological intervention here at [address] [facility],[address] during this inpatient hospital admission  Discussed at length with Bandar Bolanos PA-C, with the urology service via [facility] text messaging  Also this provider sent a detailed Tiger text message to update Dr Fredy Jensen  The patient said that she has been sending my chart messages to Dr Fredy Jensen since last Thursday, now 4 days ago  She is waiting for a response and is asking if cystoscopy can be performed while she is here in the hospital   Personal inquire with Dr Caro Cannon was conducted at the request of Bandar Bolanos, Dr Dorian Cowart said he unavailable here at [facility] later in the week and that the patient can have her outpatient cystoscopy rescheduled with Dr Fredy Jensen at his availability  At this time her cystoscopy in [facility], June 6, has been postponed  Bandar Bolanos contacted the urology , the urology service will contact the patient via telephone for recommendations and further disposition regarding rescheduling her outpatient procedure  Medical management and hospital discharge when medically optimal at the direction of the attending primary service  We will have nursing repeat her vital signs including blood pressure at this time  No plan for any urological intervention during her hospital admission here at this campus  History of Present Illness     HPI:  Edie Fernandez is a 46 y o  female followed by Dr Diane Li with history of bilateral nonobstructing urinary calculi who was scheduled for outpatient cystoscopy tomorrow in [address] [facility] with him, Tuesday, June 6, 2023    She has a complicated medical history of seizure disorder which she believes secondary to anaphylactic reaction from contrast dye, history of mast cell activation syndrome and Crohn's disease who has been hospitalized here since being evaluated in the emergency department on May 29th because of decreased oral intake, nonbilious vomiting and nonbloody diarrhea  Patient has a history of Crohn's disease, she had been on Stelara and omalizumab infusions which she missed prior to admission here  The patient had been on budesonide 3 mg 3 times a day but her  apparently did not initially bring the medication in  Patient was found to be hyponatremic  Patient was seen in gastroenterology consultation, her last EGD in May of this year was pathologically normal, colonoscopy showed mild patchy granular mucosa in the left colon and consistent with chronic active colitis  This admission her symptoms improved with IV steroids and then eventually transition to oral prednisolone  The patient reported bright red blood in her urine yesterday, Sunday, June 4, 2023  She admitted some urinary burning and frequency  The patient noted some pink-tinged urine which is now resolved  Denies any fever or chills  No suprapubic pain or tenderness  Urinalysis obtained today was entirely negative  Right now the patient is emotionally labile, crying  Her  is present in the room with her  She has a multitude of complaints unrelated to her urinary symptoms, her urinalysis is clean and she denies any blood in the urine at present  She was to have a scheduled outpatient cystoscopy with Dr Erendira Bond tomorrow  Extensive amount of Tiger text conversation with the urology PA on call, Aleah Hernandez PA-C was conducted  Also personal discussion with Dr Rajat Leonardo, urologist here today  Lastly extensive conversation was conducted with the attending hospitalist physician, Dr Ara Ferrera Counts    The patient said that she left a message on My Chart since last Thursday with   Sofia Brown  The patient is asking if her cystoscopy can be conducted while she is hospitalized  The patient would need to be premedicated with IV fluids and antihistamines  At this time inpatient cystoscopy will not be conducted  The urology office will contact the patient personally according to Jannie Galvin to make arrangements to reschedule her outpatient cystoscopy  Inpatient consult to Urology  Consult performed by: Elvis Suazo PA-C  Consult ordered by: Sheng Pastor PA-C          Review of Systems   Constitutional: Positive for appetite change  Negative for activity change, chills, diaphoresis, fatigue, fever and unexpected weight change  HENT: Negative  Eyes: Negative  Respiratory: Negative  Cardiovascular: Negative for chest pain, palpitations and leg swelling  Gastrointestinal: Negative for abdominal distention, abdominal pain, anal bleeding, blood in stool, constipation, diarrhea, nausea, rectal pain and vomiting  Endocrine: Negative  Genitourinary: Positive for dysuria, frequency and hematuria  Negative for decreased urine volume, difficulty urinating, dyspareunia, flank pain, pelvic pain, urgency, vaginal bleeding, vaginal discharge and vaginal pain  Musculoskeletal: Negative  Negative for back pain  Skin: Negative  Allergic/Immunologic: Negative  Neurological: Negative  Hematological: Negative  Does not bruise/bleed easily  Psychiatric/Behavioral: The patient is nervous/anxious           Historical Information   Past Medical History:   Diagnosis Date   • Anemia 2007   • Anxiety    • Asthma    • Bile duct stricture 2014    Sphincter of oddi dysfunction   • Chronic kidney disease    • Colon polyp 1998   • Crohn's colitis ([facility] [address] )    • [facility] )    • Disease of thyroid gland    • 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   • Generalized anxiety disorder 08/26/2017   • GERD (gastroesophageal reflux disease) 1995   • GI (gastrointestinal bleed) 1994   • Heart murmur    • Inflammatory bowel disease    • Irritable bowel syndrome 2016   • Lactose intolerance 1982   • Mast cell disease    • Migraine    • Myocardial infarction [facility])     NSTEMI  pt denies, documented in cardio note   • Pancreatitis     sphinger of    • Psychiatric disorder    • RA (rheumatoid arthritis) ([facility] [address] )    • Seizures ([facility] [address] )    • Ulcerative colitis ([facility] [address] )    • Visual impairment      Past Surgical History:   Procedure Laterality Date   • ABDOMINAL SURGERY  2017   • APPENDECTOMY     • CHOLECYSTECTOMY     • COLONOSCOPY  2019   • EGD AND COLONOSCOPY N/A 09/12/2017    Procedure: EGD AND COLONOSCOPY;  Surgeon: Grupo Hma MD;  Location:  GI LAB; Service: Gastroenterology   • ERCP N/A 09/15/2017    Procedure: ENDOSCOPIC RETROGRADE CHOLANGIOPANCREATOGRAPHY (ERCP); Surgeon: Nikunj Augustin MD;  Location:  GI LAB;   Service: Gastroenterology   • HYSTERECTOMY     • KNEE SURGERY Right    • LAPAROSCOPIC ENDOMETRIOSIS FULGURATION     • ORIF ANKLE FRACTURE Left    • CT ARTHRS KNE SURG W/MENISCECTOMY MED/LAT W/SHVG Left 05/12/2017    Procedure: POSSIBLE MEDIAL MENISECTOMY;  Surgeon: Cornelio Miramontes MD;  Location: [facility] MAIN OR;  Service: Orthopedics   • CT ARTHRS KNEE DEBRIDEMENT/SHAVING ARTCLR CRTLG Left 05/12/2017    Procedure: KNEE ARTHROSCOPY EVALUATION, CHONDROPLASTY;  Surgeon: Cornelio Miramontes MD;  Location: [facility];  Service: Orthopedics   • CT LAPS ABD PRTM&OMENTUM DX W/WO SPEC BR/WA SPX N/A 12/12/2017    Procedure: LAPAROSCOPY DIAGNOSTIC  WITH LYSIS OF ADHESIONS;  Surgeon: Rupinder Delgado DO;  Location:  MAIN OR;  Service: General   • UPPER GASTROINTESTINAL ENDOSCOPY  2019     Social History   Social History     Substance and Sexual Activity   Alcohol Use Never     Social History     Substance and Sexual Activity   Drug Use Never     E-Cigarette/Vaping   • E-Cigarette Use Never User      E-Cigarette/Vaping Substances   • Nicotine No    • THC No    • CBD No    • Flavoring No    • Other No    • Unknown No      Social History     Tobacco Use   Smoking Status Never   Smokeless Tobacco Never     Family History: non-contributory    Meds/Allergies   current meds:   Current Facility-Administered Medications   Medication Dose Route Frequency   • acetaminophen (TYLENOL) tablet 650 mg  650 mg Oral Q6H PRN   • albuterol (PROVENTIL HFA,VENTOLIN HFA) inhaler 2 puff  2 puff Inhalation Q4H PRN   • butalbital-acetaminophen-caffeine (FIORICET,ESGIC) -40 mg per tablet 1 tablet  1 tablet Oral Q8H PRN   • cholecalciferol (VITAMIN D) oral liquid 1,000 Units  1,000 Units Oral BID   • diphenhydrAMINE (BENADRYL) injection 50 mg  50 mg Intramuscular Q6H   • diphenhydrAMINE (BENADRYL) injection 50 mg  50 mg Intravenous Q6H PRN   • Famotidine (PF) (PEPCID) injection 20 mg  20 mg Intravenous Q12H [address]   • HYDROmorphone (DILAUDID) injection 1 mg  1 mg Intravenous Q4H PRN   • levothyroxine tablet 100 mcg  100 mcg Oral Early Morning   • LORazepam (ATIVAN) injection 1 mg  1 mg Intravenous Q4H PRN   • methylPREDNISolone sodium succinate (Solu-MEDROL) injection 20 mg  20 mg Intravenous Q8H [address]   • ondansetron (ZOFRAN) injection 4 mg  4 mg Intravenous Q6H PRN   • pantoprazole (PROTONIX) injection 40 mg  40 mg Intravenous Q24H KARISSA   • potassium-sodium phosphateS (K-PHOS,PHOSPHA 250) -250 mg tablet 1 tablet  1 tablet Oral TID With Meals   • predniSONE tablet 20 mg  20 mg Oral Daily PRN   • sodium chloride 0 9 % infusion  75 mL/hr Intravenous Continuous     Allergies   Allergen Reactions   • Aimovig [Erenumab-Aooe] Anaphylaxis   • Amitriptyline Anaphylaxis     According to the patient she had nphylaxis   • Aspergillus Allergy Skin Test Other (See Comments), Hives and Swelling   • Azathioprine Other (See Comments) and Anaphylaxis     pancreatitis  According to patient she needed Epipen   • Banana - Food Allergy Itching, Swelling and Anaphylaxis   • Buspar [Buspirone] Hives and Shortness Of Breath   • Citric Acid-Polysorbate 80 "Abdominal Pain, Anaphylaxis, Anxiety, Bradycardia, Diarrhea, Fatigue, GI Intolerance, Headache, Hives, Hyperactivity, Itching, Lip Swelling, Nausea Only, Palpitations, Rash, Shortness Of Breath, Tachycardia, Throat Swelling, Tongue Swelling and Vomiting   • Corn Dextrin Anaphylaxis     Any corn based products   • Eggs Or Egg-Derived Products - Food Allergy Anaphylaxis   • Epinephrine Anaphylaxis   • Erenumab Anaphylaxis     patient sent portal message stating she had anaphylaxis  patient sent portal message stating she had anaphylaxis     • Gadolinium Abdominal Pain, Anaphylaxis, Anxiety, Confusion, Delirium, Dizziness, Eye Swelling, Fatigue, GI Intolerance, Headache, Hives, Irritability, Itching, Lip Swelling, Nausea Only, Palpitations, Photosensitivity, Rash, Seizures, Shortness Of Breath, Swelling, Syncope, Throat Swelling, Tongue Swelling, Tremor, Visual Disturbance and Vomiting   • Gelatin - Food Allergy Anaphylaxis   • Heparin Hives     Painful welts    • Lactated Ringers Shortness Of Breath     Pt questions this allergy, pt has received LR multiple times without reaction   • Lavender Oil Anaphylaxis     Other reaction(s): anaphalxis   • Malto Dextrin [Dextrin] Anaphylaxis   • Other Anaphylaxis     Gel caps, maltodextrose, dextrose,grapes, lavender    • Penicillium Notatum Shortness Of Breath and Swelling   • Sumatriptan Anaphylaxis     Bradycardia, sob, back pressure, head pain,throat tightness  According to the patient she had anphylaxis   • Ustekinumab Anaphylaxis   • Contrast [Iodinated Contrast Media] Other (See Comments)     Pt states needs to be premedicated prior to injection   • Corn Oil - Food Allergy - Food Allergy Hives   • Humira [Adalimumab] Other (See Comments)     \"I develop drug induced lupus\"   • Ibuprofen Hives and Throat Swelling   • Polyethylene Glycol Diarrhea and Vomiting   • Red Dye - Food Allergy Other (See Comments)     intolerance   • Remicade [Infliximab] Other (See Comments)     \"I " "develop drug induced lupus\"   • Soy Allergy - Food Allergy Other (See Comments)   • Sulfa Antibiotics Hives and Other (See Comments)   • Sulfate Hives   • Sulfites - Food Allergy Hives   • Chlorhexidine Itching   • Freederm Adhesive Remover [New Skin] Rash   • Histamine Hives, Rash and Other (See Comments)     Intolerance         Objective   First Vitals:   Blood Pressure: 150/98 (05/29/23 0216)  Pulse: (!) 106 (05/29/23 0216)  Temperature: 98 4 °F (36 9 °C) (05/29/23 0216)  Temp Source: Temporal (05/29/23 0216)  Respirations: 18 (05/29/23 0216)  Height: 5' 0 5\" (153 7 cm) (05/29/23 0216)  Weight - Scale: 67 1 kg (148 lb) (05/29/23 0216)  SpO2: 96 % (05/29/23 0216)    Current Vitals:   Blood Pressure: (!) 140/103 (06/05/23 1507)  Pulse: (!) 114 (06/05/23 1507)  Temperature: 98 6 °F (37 °C) (06/05/23 1507)  Temp Source: Oral (06/04/23 1421)  Respirations: 20 (06/05/23 1507)  Height: 5' 5\" (165 1 cm) (05/29/23 1054)  Weight - Scale: 70 3 kg (154 lb 15 7 oz) (06/05/23 0600)  SpO2: 94 % (06/05/23 1507)      Intake/Output Summary (Last 24 hours) at 6/5/2023 1526  Last data filed at 6/5/2023 1304  Gross per 24 hour   Intake 480 ml   Output 450 ml   Net 30 ml       Invasive Devices     Peripheral Intravenous Line  Duration           Peripheral IV 06/02/23 Right;Ventral (anterior) Forearm 3 days                Physical Exam     BP (!) 140/103   Pulse (!) 114   Temp 98 6 °F (37 °C)   Resp 20   Ht 5' 5\" (1 651 m)   Wt 70 3 kg (154 lb 15 7 oz)   SpO2 94%   BMI 25 79 kg/m²       She is emotionally labile, crying  Her  is present in the room here with her  The patient is not toxic or in acute distress  Skin without pallor or jaundice  Warm and dry to touch  ENT clear  Sclera white  Pharynx noninflamed  Neck no adenopathy or goiter  Chest no respiratory distress  Good inspiratory effort  Heart RRR  No murmur or gallop  Lungs CTA  Abdomen positive bowel sounds, soft    No guarding or " "rebound  Extremities no clubbing, no cyanosis peripheral edema  Ambulation was not witnessed  No focal motor or sensory neurologic weakness is apparent  Neurological exam shows cranial nerves intact  She is fully oriented  Speech is clear  No tremor noted  Mental status seems appropriate, she can be tangential in her train of thought, seems anxious in her demeanor  Lab Results:   I have personally reviewed pertinent lab results    , CBC:   Lab Results   Component Value Date    HCT 37 6 06/05/2023    HGB 12 4 06/05/2023    MCH 30 6 06/05/2023    MCHC 33 0 06/05/2023    MCV 93 06/05/2023    MPV 8 7 (L) 06/05/2023     (H) 06/05/2023    RBC 4 05 06/05/2023    RDW 14 5 06/05/2023    WBC 18 91 (H) 06/05/2023   , CMP:   Lab Results   Component Value Date    BUN 15 06/05/2023    CALCIUM 7 7 (L) 06/05/2023     06/05/2023    CO2 29 06/05/2023    CREATININE 0 65 06/05/2023    EGFR 103 06/05/2023    K 3 7 06/05/2023    SODIUM 138 06/05/2023   , Coagulation: No results found for: \"APTT\", \"INR\", \"PT\", Lipase: No results found for: \"LIPASE\"     Urinalysis with microscopic  Order: [id number]   Status: Final result      Visible to patient: Yes (seen)      Next appt: 06/07/2023 at 12:00 PM in Infusion Therapy ([facility])      0 Result Notes             Component Ref Range & Units 6/5/23 1216 6/1/23 1529 3/5/23 1420 2/15/23 1652 10/5/22 1056 9/27/22 1253 9/27/22 1253   Color, UA  Light Yellow  Yellow  Yellow  yellow  YELLOW   Yellow    Clarity, UA  Clear  Clear  Clear  clear  CLEAR   Clear    Specific [place], UA 1 003 - 1 030 1 010  1 015  1 020  1 020 R  1 020 R   <=1 005    pH, UA 4 5, 5 0, 5 5, 6 0, 6 5, 7 0, 7 5, 8 0 7 5  6 5  6 0  5 5 R  6 0 R   6 5    Leukocytes, UA Negative Negative  Negative  Negative  neg R  N R   Negative    Nitrite, UA Negative Negative  Negative  Negative  neg R  N R   Negative    Protein, UA Negative mg/dl Negative  Negative  Negative  neg R  N R   Negative    Glucose, UA " Negative mg/dl Negative  100 (1/10%) Abnormal   250 (1/4%) Abnormal   neg R  N R   100 (1/10%) Abnormal     Ketones, UA Negative mg/dl Negative  Negative  Negative  neg R  N R   Negative    Urobilinogen, UA 0 2, 1 0 E U /dl E U /dl 0 2  0 2  0 2  0 2 R  0 2 R   0 2    Bilirubin, UA Negative Negative  Negative  Negative     Negative    Occult Blood, UA Negative Negative  Negative  Small Abnormal   small R  N R   Small Abnormal     RBC, UA None Seen, 2-4 /hpf None Seen   2-4    2-4 R     WBC, UA None Seen, 2-4, 5-60 /hpf None Seen   1-2 Abnormal     1-2 R     Epithelial Cells None Seen, Occasional /hpf None Seen   Occasional    Occasional     Bacteria, UA None Seen, Occasional /hpf None Seen   Occasional    Occasional     BILIRUBIN,UA     neg  TR                 Specimen Collected: 06/05/23 12:16 Last Resulted: 06/05/23 12:53             Imaging: I have personally reviewed pertinent reports  EKG, Pathology, and Other Studies: I have personally reviewed pertinent reports  Counseling / Coordination of Care  Total floor / unit time spent today 70 minutes  Greater than 50% of total time was spent with the patient and / or family counseling and / or coordination of care  A description of the counseling / coordination of care:  Extensive amount of time conducted by this provider in the coordination of care including lengthy discussion with the attending hospitalist, urology ADONIS on-call, and Tiger text messaging with Dr Ronnie Kumar plus personal conversation with Dr Theda Lefort here, in addition reviewed the patient's complex medical history, personal examination the patient and amount of time spent with personal counseling and explanation of planned urological management all explained in detail to the patient requiring an excessive amount of time      Richa Peck PA-C

## 2023-06-05 NOTE — PROGRESS NOTES
[address]  Progress Note  Name: Alexandria Lorenzana  MRN: [id number]  Unit/Bed#: 729-19 I Date of Admission: 5/29/2023   Date of Service: 6/5/2023 I Hospital Day: 7    Assessment/Plan   * Crohn's disease of colon with complication ([email or web address] 75 )  Assessment & Plan  · Stable, no bloody diarrhea  · On Stelara and omalizumab infusion treatment as outpatient  · Patient missed all infusions this past week prior to admission  · She reports taking budesonide 3 mg 3 times daily at home  She brought her home medication which she has been taking and is now discontinued  · GI consulted, appreciate  · She completed prior IV course steroids and GI recommended switching to PO, however due to her corn allergy she has home PO steroids she must use - she did not have her  bring them in  · IV steroids resumed 6/4  · Patient states her symptoms are continuing, with abd pain since restarting diet, does continue to tolerate diet though, just not well      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[place] [address] )  Assessment & Plan  · Follows with mast cell activation specialist at the [facility]  · Continue Benadryl 50 mg IM twice daily, Protonix 40 mg twice daily, pepcid twice daily  · Monitor symptoms    Hematuria  Assessment & Plan  · Patient reports bright red blood in urine 6/3, then reported pink tinged and she is having some symptoms of frequency and straining with urination  · Per patient has cystoscopy scheduled Tuesday with [facility] urology  Patient reports cystoscopy is being done because she possibly has spots of cancer, but also some deposits of the kidney (kidney stones vs heavy metals?)  On review of urology notes, diagnosis for cytoscopy is microscopic hematuria and kidney stones  · She was scheduled for appt in infusion center today for infusion of NS, steroids, protonix, pepcid and benadryl in anticipation of her cysto on Tuesday    · Patient does not feel well enough for discharge and is worried about dehydration and activation/worsening of her MAST cell surrounding her cystoscopy, requesting transfer for inpatient cystoscopy  I discussed with her this was scheduled as an outpatient procedure and typically will not be done as inpatient  · Urology consulted, awaiting their evaluation and if they would consider inpatient procedure given the above    Seizure [facility])  Assessment & Plan  · Ativan as needed  · Not on any other seizure medications at home  · EEG completed  Patient had one of her typical events  No epileptic activity associated with the event  Consistent with psychogenic seizures  · She reports she has been told her 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, will review  · Recommend outpatient therapy     Decreased oral intake  Assessment & Plan  Decreased p o  intake for the past week due to persistent vomiting and diarrhea    · Patient is not tolerating diet, all her meals are brought from home by her  due to her reported corn allergy  · Continue IV fluids as above    Hypothyroidism (acquired)  Assessment & Plan  · TSH WNL 1 889  · Continue levothyroxine 100 mcg daily    SIRS (systemic inflammatory response syndrome) ([facility] )  Assessment & Plan  SIRS, resolved, possibly due to Crohn's disease/Viral gastroenteritis; presented with WBC 13 25 and   · Stool cultures negative  · treated with IVF, IV Medrol, IV Protonix,   · GI input appreciated  · Monitor VS, CBC  · WBC increased today, likely due to IV steroids but with urinary symptoms, will check UA            Vomiting and diarrhea  Assessment & Plan  Presented to ED with ongoing nonbloody nonbilious emesis and nonbloody diarrhea since EGD colonoscopy on 5/2/2023  · Reported this had been worsening over the past week  · Vomiting improved with IV Zofran-continue p o  as needed    Diarrhea appears resolved  · Stool C  difficile and PCR negative  · Received 1 5 L normal saline bolus in ED, continued normal saline 100 cc/h for maintenance - was discontinued and now restarted at 75cc/hr, will continue as she reports her PO intake limited here due to allergy  · Met SIRS criteria on admission likely due to Crohn's vs viral gastroenteritis and resovled  · Replace electrolytes as needed  Mag and K normal today                 VTE Pharmacologic Prophylaxis: VTE Score: 3 Moderate Risk (Score 3-4) - Pharmacological DVT Prophylaxis Contraindicated  Sequential Compression Devices Ordered  Patient Centered Rounds: I performed bedside rounds with nursing staff today  Discussions with Specialists or Other Care Team Provider: Urology    Education and Discussions with Family / Patient: Patient declined call to   Total Time Spent on Date of Encounter in care of patient: 45 minutes This time was spent on one or more of the following: performing physical exam; counseling and coordination of care; obtaining or reviewing history; documenting in the medical record; reviewing/ordering tests, medications or procedures; communicating with other healthcare professionals and discussing with patient's family/caregivers  Current Length of Stay: 7 day(s)  Current Patient Status: Inpatient   Certification Statement: The patient will continue to require additional inpatient hospital stay due to continuing IVF  Discharge Plan: pending possibly inpatient cystoscopy and transfer, or home when ready/able    Code Status: Level 1 - Full Code    Subjective:   Patient reports that she had worsening GI symptoms last night as far as abdominal pain, no reported diarrhea  She has several concerns about her cystoscopy, which she is requesting be done as an inpatient, this was discussed as above in assessment and plan, but have consulted urology and wait to hear from them and she is hoping that this can be done    She otherwise has several concerns about becoming dehydrated and possibly worsening surrounding her cystoscopy due to her Mast cell/HATS    Objective:     Vitals:   Temp (24hrs), Av 1 °F (36 7 °C), Min:98 °F (36 7 °C), Max:98 4 °F (36 9 °C)    Temp:  [98 °F (36 7 °C)-98 4 °F (36 9 °C)] 98 4 °F (36 9 °C)  HR:  [69-98] 69  Resp:  [16-18] 18  BP: (139-145)/(93-99) 145/99  SpO2:  [94 %-97 %] 97 %  Body mass index is 25 79 kg/m²  Input and Output Summary (last 24 hours): Intake/Output Summary (Last 24 hours) at 2023 1010  Last data filed at 2023 6784  Gross per 24 hour   Intake 0 ml   Output 450 ml   Net -450 ml       Physical Exam:   Physical Exam  Vitals and nursing note reviewed  Constitutional:       General: She is not in acute distress  Appearance: She is well-developed  HENT:      Head: Normocephalic and atraumatic  Eyes:      Conjunctiva/sclera: Conjunctivae normal    Cardiovascular:      Rate and Rhythm: Normal rate  Heart sounds: No murmur heard  Pulmonary:      Effort: Pulmonary effort is normal  No respiratory distress  Breath sounds: Normal breath sounds  Abdominal:      Palpations: Abdomen is soft  Tenderness: There is no abdominal tenderness  Musculoskeletal:         General: No swelling  Cervical back: Neck supple  Skin:     General: Skin is warm and dry  Capillary Refill: Capillary refill takes less than 2 seconds  Neurological:      Mental Status: She is alert     Psychiatric:         Mood and Affect: Mood normal           Additional Data:     Labs:  Results from last 7 days   Lab Units 2333 23  0617   EOS PCT %  --  0   HEMATOCRIT % 37 6 40 3   HEMOGLOBIN g/dL 12 4 13 3   LYMPHS PCT %  --  12*   MONOS PCT %  --  7   NEUTROS PCT %  --  79*   PLATELETS Thousands/uL 497* 517*   WBC Thousand/uL 18 91* 14 39*     Results from last 7 days   Lab Units 2333 23  06   ANION GAP mmol/L 7 7   ALBUMIN g/dL  --  3 3*   ALK PHOS U/L  --  53   ALT U/L  --  38   AST U/L  --  16   BUN mg/dL 15 9   CALCIUM mg/dL 7 7* 7 6*   CHLORIDE mmol/L 102 104   CO2 mmol/L 29 29   CREATININE mg/dL 0 65 0 61   GLUCOSE RANDOM mg/dL 175* 131   POTASSIUM mmol/L 3 7 3 5   SODIUM mmol/L 138 140   TOTAL BILIRUBIN mg/dL  --  0 20                 Results from last 7 days   Lab Units 05/30/23  0504   PROCALCITONIN ng/ml <0 05       Lines/Drains:  Invasive Devices     Peripheral Intravenous Line  Duration           Peripheral IV 06/02/23 Right;Ventral (anterior) Forearm 3 days                      Imaging: No pertinent imaging reviewed      Recent Cultures (last 7 days):   Results from last 7 days   Lab Units 05/29/23  2234   C DIFF TOXIN B BY PCR  Negative       Last 24 Hours Medication List:   Current Facility-Administered Medications   Medication Dose Route Frequency Provider Last Rate   • acetaminophen  650 mg Oral Q6H PRN Melita Marus, DO     • albuterol  2 puff Inhalation Q4H PRN Melita Marus, DO     • butalbital-acetaminophen-caffeine  1 tablet Oral Q8H PRN Melita Marus, DO     • cholecalciferol  1,000 Units Oral BID Melita Marus, DO     • diphenhydrAMINE  50 mg Intramuscular Q6H Holly Moody MD     • diphenhydrAMINE  50 mg Intravenous Q6H PRN Jack Chen PA-C     • famotidine  20 mg Intravenous Q12H [address] Jack Chen PA-C     • HYDROmorphone  1 mg Intravenous Q4H PRN Jack Chen PA-C     • levothyroxine  100 mcg Oral Early Morning Betzy Grigsby, DO     • LORazepam  1 mg Intravenous Q4H PRN David Tripp DO     • methylPREDNISolone sodium succinate  20 mg Intravenous [facility] Jamila Grant PA-C     • ondansetron  4 mg Intravenous Q6H PRN Holly Moody MD     • pantoprazole  40 mg Intravenous Q24H [address] Jack Chen PA-C     • potassium-sodium phosphateS  1 tablet Oral TID With Meals Holly Moody MD     • predniSONE  20 mg Oral Daily PRN Melita Angelia, DO     • sodium chloride  75 mL/hr Intravenous Continuous Jamila Grant PA-C 75 mL/hr (06/05/23 1296)        Today, Patient Was Seen By: Annalisa Dominguez DO    **Please Note: This note may have been constructed using a voice recognition system  **

## 2023-06-06 ENCOUNTER — TELEMEDICINE (OUTPATIENT)
Dept: UROLOGY | Facility: MEDICAL CENTER | Age: 48
End: 2023-06-06
Payer: COMMERCIAL

## 2023-06-06 ENCOUNTER — TELEPHONE (OUTPATIENT)
Dept: UROLOGY | Facility: MEDICAL CENTER | Age: 48
End: 2023-06-06

## 2023-06-06 DIAGNOSIS — R31.29 MICROSCOPIC HEMATURIA: Primary | ICD-10-CM

## 2023-06-06 LAB
ERYTHROCYTE [DISTWIDTH] IN BLOOD BY AUTOMATED COUNT: 14.9 % (ref 11.6–15.1)
HCT VFR BLD AUTO: 42.3 % (ref 34.8–46.1)
HGB BLD-MCNC: 13.6 G/DL (ref 11.5–15.4)
MCH RBC QN AUTO: 29.8 PG (ref 26.8–34.3)
MCHC RBC AUTO-ENTMCNC: 32.2 G/DL (ref 31.4–37.4)
MCV RBC AUTO: 93 FL (ref 82–98)
PLATELET # BLD AUTO: 555 THOUSANDS/UL (ref 149–390)
PMV BLD AUTO: 9 FL (ref 8.9–12.7)
RBC # BLD AUTO: 4.57 MILLION/UL (ref 3.81–5.12)
WBC # BLD AUTO: 19.32 THOUSAND/UL (ref 4.31–10.16)

## 2023-06-06 PROCEDURE — 85027 COMPLETE CBC AUTOMATED: CPT | Performed by: HOSPITALIST

## 2023-06-06 PROCEDURE — 99233 SBSQ HOSP IP/OBS HIGH 50: CPT | Performed by: HOSPITALIST

## 2023-06-06 PROCEDURE — 99232 SBSQ HOSP IP/OBS MODERATE 35: CPT | Performed by: INTERNAL MEDICINE

## 2023-06-06 PROCEDURE — C9113 INJ PANTOPRAZOLE SODIUM, VIA: HCPCS | Performed by: PHYSICIAN ASSISTANT

## 2023-06-06 PROCEDURE — 99213 OFFICE O/P EST LOW 20 MIN: CPT | Performed by: UROLOGY

## 2023-06-06 RX ORDER — MONTELUKAST SODIUM 10 MG/1
TABLET ORAL
COMMUNITY
Start: 2023-05-13

## 2023-06-06 RX ORDER — HYDROXYZINE HYDROCHLORIDE 25 MG/1
TABLET, FILM COATED ORAL
COMMUNITY
Start: 2023-05-26

## 2023-06-06 RX ORDER — EPINEPHRINE 0.3 MG/.3ML
INJECTION INTRAMUSCULAR
COMMUNITY
Start: 2023-05-30

## 2023-06-06 RX ADMIN — METHYLPREDNISOLONE SODIUM SUCCINATE 20 MG: 40 INJECTION, POWDER, FOR SOLUTION INTRAMUSCULAR; INTRAVENOUS at 13:01

## 2023-06-06 RX ADMIN — HYDROMORPHONE HYDROCHLORIDE 1 MG: 1 INJECTION, SOLUTION INTRAMUSCULAR; INTRAVENOUS; SUBCUTANEOUS at 04:12

## 2023-06-06 RX ADMIN — ONDANSETRON 4 MG: 2 INJECTION INTRAMUSCULAR; INTRAVENOUS at 07:04

## 2023-06-06 RX ADMIN — HYDROMORPHONE HYDROCHLORIDE 1 MG: 1 INJECTION, SOLUTION INTRAMUSCULAR; INTRAVENOUS; SUBCUTANEOUS at 17:36

## 2023-06-06 RX ADMIN — PANTOPRAZOLE SODIUM 40 MG: 40 INJECTION, POWDER, FOR SOLUTION INTRAVENOUS at 10:24

## 2023-06-06 RX ADMIN — FAMOTIDINE 20 MG: 10 INJECTION, SOLUTION INTRAVENOUS at 21:03

## 2023-06-06 RX ADMIN — HYDROMORPHONE HYDROCHLORIDE 1 MG: 1 INJECTION, SOLUTION INTRAMUSCULAR; INTRAVENOUS; SUBCUTANEOUS at 10:24

## 2023-06-06 RX ADMIN — LORAZEPAM 1 MG: 2 INJECTION INTRAMUSCULAR; INTRAVENOUS at 12:47

## 2023-06-06 RX ADMIN — DIPHENHYDRAMINE HYDROCHLORIDE 50 MG: 50 INJECTION, SOLUTION INTRAMUSCULAR; INTRAVENOUS at 23:06

## 2023-06-06 RX ADMIN — FAMOTIDINE 20 MG: 10 INJECTION, SOLUTION INTRAVENOUS at 10:24

## 2023-06-06 RX ADMIN — SODIUM CHLORIDE 75 ML/HR: 0.9 INJECTION, SOLUTION INTRAVENOUS at 22:56

## 2023-06-06 RX ADMIN — METHYLPREDNISOLONE SODIUM SUCCINATE 20 MG: 40 INJECTION, POWDER, FOR SOLUTION INTRAMUSCULAR; INTRAVENOUS at 05:34

## 2023-06-06 RX ADMIN — ONDANSETRON 4 MG: 2 INJECTION INTRAMUSCULAR; INTRAVENOUS at 14:53

## 2023-06-06 RX ADMIN — SODIUM CHLORIDE 75 ML/HR: 0.9 INJECTION, SOLUTION INTRAVENOUS at 07:04

## 2023-06-06 RX ADMIN — DIPHENHYDRAMINE HYDROCHLORIDE 50 MG: 50 INJECTION, SOLUTION INTRAMUSCULAR; INTRAVENOUS at 05:34

## 2023-06-06 RX ADMIN — METHYLPREDNISOLONE SODIUM SUCCINATE 20 MG: 40 INJECTION, POWDER, FOR SOLUTION INTRAMUSCULAR; INTRAVENOUS at 21:02

## 2023-06-06 RX ADMIN — HYDROMORPHONE HYDROCHLORIDE 1 MG: 1 INJECTION, SOLUTION INTRAMUSCULAR; INTRAVENOUS; SUBCUTANEOUS at 21:46

## 2023-06-06 RX ADMIN — LORAZEPAM 1 MG: 2 INJECTION INTRAMUSCULAR; INTRAVENOUS at 05:34

## 2023-06-06 RX ADMIN — LORAZEPAM 1 MG: 2 INJECTION INTRAMUSCULAR; INTRAVENOUS at 21:02

## 2023-06-06 RX ADMIN — DIPHENHYDRAMINE HYDROCHLORIDE 50 MG: 50 INJECTION, SOLUTION INTRAMUSCULAR; INTRAVENOUS at 12:47

## 2023-06-06 RX ADMIN — DIPHENHYDRAMINE HYDROCHLORIDE 50 MG: 50 INJECTION, SOLUTION INTRAMUSCULAR; INTRAVENOUS at 17:35

## 2023-06-06 NOTE — TELEPHONE ENCOUNTER
Pt called stated that she scheduled an appt today at 9 via my chart and wants that appt to be a telephone visit       Pt is in the hospital and wants to speak with Dr Francisco Slaughter     Please review

## 2023-06-06 NOTE — PLAN OF CARE
Problem: MOBILITY - ADULT  Goal: Maintain or return to baseline ADL function  Description: INTERVENTIONS:  -  Assess patient's ability to carry out ADLs; assess patient's baseline for ADL function and identify physical deficits which impact ability to perform ADLs (bathing, care of mouth/teeth, toileting, grooming, dressing, etc )  - Assess/evaluate cause of self-care deficits   - Assess range of motion  - Assess patient's mobility; develop plan if impaired  - Assess patient's need for assistive devices and provide as appropriate  - Encourage maximum independence but intervene and supervise when necessary  - Involve family in performance of ADLs  - Assess for home care needs following discharge   - Consider OT consult to assist with ADL evaluation and planning for discharge  - Provide patient education as appropriate  Outcome: Progressing  Goal: Maintains/Returns to pre admission functional level  Description: INTERVENTIONS:  - Perform BMAT or MOVE assessment daily    - Set and communicate daily mobility goal to care team and patient/family/caregiver  - Collaborate with rehabilitation services on mobility goals if consulted  - Perform Range of Motion 3-4 times a day  - Reposition patient every 2 hours    - Dangle patient 3 times a day  - Stand patient 3 times a day  - Ambulate patient 3 times a day  - Out of bed to chair 3 times a day   - Out of bed for meals 3 times a day  - Out of bed for toileting  - Record patient progress and toleration of activity level   Outcome: Progressing     Problem: GASTROINTESTINAL - ADULT  Goal: Minimal or absence of nausea and/or vomiting  Description: INTERVENTIONS:  - Administer IV fluids if ordered to ensure adequate hydration  - Maintain NPO status until nausea and vomiting are resolved  - Nasogastric tube if ordered  - Administer ordered antiemetic medications as needed  - Provide nonpharmacologic comfort measures as appropriate  - Advance diet as tolerated, if ordered  - Consider nutrition services referral to assist patient with adequate nutrition and appropriate food choices  Outcome: Progressing  Goal: Maintains or returns to baseline bowel function  Description: INTERVENTIONS:  - Assess bowel function  - Encourage oral fluids to ensure adequate hydration  - Administer IV fluids if ordered to ensure adequate hydration  - Administer ordered medications as needed  - Encourage mobilization and activity  - Consider nutritional services referral to assist patient with adequate nutrition and appropriate food choices  Outcome: Progressing  Goal: Maintains adequate nutritional intake  Description: INTERVENTIONS:  - Monitor percentage of each meal consumed  - Identify factors contributing to decreased intake, treat as appropriate  - Assist with meals as needed  - Monitor I&O, weight, and lab values if indicated  - Obtain nutrition services referral as needed  Outcome: Progressing     Problem: METABOLIC, FLUID AND ELECTROLYTES - ADULT  Goal: Electrolytes maintained within normal limits  Description: INTERVENTIONS:  - Monitor labs and assess patient for signs and symptoms of electrolyte imbalances  - Administer electrolyte replacement as ordered  - Monitor response to electrolyte replacements, including repeat lab results as appropriate  - Instruct patient on fluid and nutrition as appropriate  Outcome: Progressing  Goal: Fluid balance maintained  Description: INTERVENTIONS:  - Monitor labs   - Monitor I/O and WT  - Instruct patient on fluid and nutrition as appropriate  - Assess for signs & symptoms of volume excess or deficit  Outcome: Progressing     Problem: PAIN - ADULT  Goal: Verbalizes/displays adequate comfort level or baseline comfort level  Description: Interventions:  - Encourage patient to monitor pain and request assistance  - Assess pain using appropriate pain scale  - Administer analgesics based on type and severity of pain and evaluate response  - Implement non-pharmacological measures as appropriate and evaluate response  - Consider cultural and social influences on pain and pain management  - Notify physician/advanced practitioner if interventions unsuccessful or patient reports new pain  Outcome: Progressing     Problem: INFECTION - ADULT  Goal: Absence or prevention of progression during hospitalization  Description: INTERVENTIONS:  - Assess and monitor for signs and symptoms of infection  - Monitor lab/diagnostic results  - Monitor all insertion sites, i e  indwelling lines, tubes, and drains  - Monitor endotracheal if appropriate and nasal secretions for changes in amount and color  - [place] appropriate cooling/warming therapies per order  - Administer medications as ordered  - Instruct and encourage patient and family to use good hand hygiene technique  - Identify and instruct in appropriate isolation precautions for identified infection/condition  Outcome: Progressing     Problem: SAFETY ADULT  Goal: Maintain or return to baseline ADL function  Description: INTERVENTIONS:  -  Assess patient's ability to carry out ADLs; assess patient's baseline for ADL function and identify physical deficits which impact ability to perform ADLs (bathing, care of mouth/teeth, toileting, grooming, dressing, etc )  - Assess/evaluate cause of self-care deficits   - Assess range of motion  - Assess patient's mobility; develop plan if impaired  - Assess patient's need for assistive devices and provide as appropriate  - Encourage maximum independence but intervene and supervise when necessary  - Involve family in performance of ADLs  - Assess for home care needs following discharge   - Consider OT consult to assist with ADL evaluation and planning for discharge  - Provide patient education as appropriate  Outcome: Progressing  Goal: Maintains/Returns to pre admission functional level  Description: INTERVENTIONS:  - Perform BMAT or MOVE assessment daily    - Set and communicate daily mobility goal to care team and patient/family/caregiver  - Collaborate with rehabilitation services on mobility goals if consulted  - Perform Range of Motion 3-4 times a day  - Reposition patient every 2 hours    - Dangle patient 3 times a day  - Stand patient 3 times a day  - Ambulate patient 3 times a day  - Out of bed to chair 3 times a day   - Out of bed for meals 3 times a day  - Out of bed for toileting  - Record patient progress and toleration of activity level   Outcome: Progressing  Goal: Patient will remain free of falls  Description: INTERVENTIONS:  - Educate patient/family on patient safety including physical limitations  - Instruct patient to call for assistance with activity   - Consult OT/PT to assist with strengthening/mobility   - Keep Call bell within reach  - Keep bed low and locked with side rails adjusted as appropriate  - Keep care items and personal belongings within reach  - Initiate and maintain comfort rounds  - Make Fall Risk Sign visible to staff  - Offer Toileting every 2 Hours, in advance of need  - Initiate/Maintain bed/ chair alarm  - Apply yellow socks and bracelet for high fall risk patients  - Consider moving patient to room near nurses station  Outcome: Progressing     Problem: DISCHARGE PLANNING  Goal: Discharge to home or other facility with appropriate resources  Description: INTERVENTIONS:  - Identify barriers to discharge w/patient and caregiver  - Arrange for needed discharge resources and transportation as appropriate  - Identify discharge learning needs (meds, wound care, etc )  - Arrange for interpretive services to assist at discharge as needed  - Refer to Case Management Department for coordinating discharge planning if the patient needs post-hospital services based on physician/advanced practitioner order or complex needs related to functional status, cognitive ability, or social support system  Outcome: Progressing     Problem: Knowledge Deficit  Goal: Patient/family/caregiver demonstrates understanding of disease process, treatment plan, medications, and discharge instructions  Description: Complete learning assessment and assess knowledge base  Interventions:  - Provide teaching at level of understanding  - Provide teaching via preferred learning methods  Outcome: Progressing     Problem: Nutrition/Hydration-ADULT  Goal: Nutrient/Hydration intake appropriate for improving, restoring or maintaining nutritional needs  Description: Monitor and assess patient's nutrition/hydration status for malnutrition  Collaborate with interdisciplinary team and initiate plan and interventions as ordered  Monitor patient's weight and dietary intake as ordered or per policy  Utilize nutrition screening tool and intervene as necessary  Determine patient's food preferences and provide high-protein, high-caloric foods as appropriate       INTERVENTIONS:  - Monitor oral intake, urinary output, labs, and treatment plans  - Assess nutrition and hydration status and recommend course of action  - Evaluate amount of meals eaten  - Assist patient with eating if necessary   - Allow adequate time for meals  - Recommend/ encourage appropriate diets, oral nutritional supplements, and vitamin/mineral supplements  - Order, calculate, and assess calorie counts as needed  - Recommend, monitor, and adjust tube feedings and TPN/PPN based on assessed needs  - Assess need for intravenous fluids  - Provide specific nutrition/hydration education as appropriate  - Include patient/family/caregiver in decisions related to nutrition  Outcome: Progressing

## 2023-06-06 NOTE — TELEPHONE ENCOUNTER
----- Message from Shruti Coreas sent at 6/6/2023  4:56 AM EDT -----  Regarding: Oleg Foy  Contact: [phone]  please don't let me down  Everything seemed to work out perfect for a hospital transfer yesterday and procedure today, while in a good stabilized, hydrated state, and then everything completely fell apart last night when they informed me the hospital transfer was not happening and neither was the  Cystoscopy  I have been waiting months to have this done, worrying and planning for it, preparing mentally and physically for it and it seeemed Everything fell into place last week when i was hospitalized  However, this is now canceled and has me extremely upset, kind of panicking and worried my entire plans, HOPE, for trying to resume some capacity of normalcy and work are lost dreams  i was suppose to leave for [place] on June 13th for job interview at [employer] in [place], [place]  it's for assistant summer     10 weeks out of [place] during corn season   it was my answered prayer for a chance at some sort of normalcy, a chance

## 2023-06-06 NOTE — PROGRESS NOTES
[address]  Progress Note  Name: Eloisa Quezada  MRN: [id number]  Unit/Bed#: 798-84 I Date of Admission: 5/29/2023   Date of Service: 6/6/2023  Hospital Day: 8    Assessment/Plan   * Crohn's disease of colon with complication ([facility] )  Assessment & Plan  · Stable, no bloody diarrhea  · On Stelara and omalizumab infusion treatment as outpatient  · Patient missed all infusions this past week prior to admission  · She reports taking budesonide 3 mg 3 times daily at home  · GI consulted, appreciate  · She completed prior IV course for GI, now continuing for her MAST prep for cystoscopy   · She can resume PO steroids on discharge  · Patient states her symptoms are improved, some of the most stable she has felt in awhile -- continues mild abd pain      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[facility] )  Assessment & Plan  · Follows with mast cell activation specialist at the [facility]  · Continue Benadryl 50 mg IM twice daily, Protonix 40 mg twice daily, pepcid twice daily  · Monitor symptoms  · She needs prep for her cystoscopy, which is being addressed by the above as well as IV steroids  · She will need dosed Benadryl and IV Steroids 1hr prior to procedure as well     Hematuria  Assessment & Plan  · Patient reports bright red blood in urine 6/3, then reported pink tinged and she is having some symptoms of frequency and straining with urination  · Patient was scheduled for outpatient procedure, she typically needs significant prep 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  · Her urologist was contacted, see chart, plan for while inpatient as she missed her appointment and given her needs for prep/monitoring  · Plan for cystoscopy 6/8  · NPO at midnight wed night    Seizure [facility])  Assessment & Plan  · Ativan as needed  · Not on any other seizure medications at home  · EEG completed  Patient had one of her typical events  No epileptic activity associated with the event   Consistent with psychogenic seizures, though seizures can be result of Mast cell disease  · No evidence further seizures  · Recommend outpatient therapy     Decreased oral intake  Assessment & Plan  Decreased p o  intake for the past week due to persistent vomiting and diarrhea    · Patient is not tolerating diet, all her meals are brought from home by her  due to her reported corn allergy  · Continue IV fluids as above    Hypothyroidism (acquired)  Assessment & Plan  · TSH WNL 1 889  · Continue levothyroxine 100 mcg daily    SIRS (systemic inflammatory response syndrome) (HCC)  Assessment & Plan  SIRS, resolved, possibly due to Crohn's disease/Viral gastroenteritis; presented with WBC 13 25 and   · Resolved SIRS, see above            Vomiting and diarrhea  Assessment & Plan  Presented to ED with ongoing nonbloody nonbilious emesis and nonbloody diarrhea since EGD colonoscopy on 5/2/2023  · Stool C  difficile and PCR negative  · Met SIRS criteria on admission likely due to Crohn's vs viral gastroenteritis and resovled  · Symptoms improved/resolved                 VTE Pharmacologic Prophylaxis: VTE Score: 3 Moderate Risk (Score 3-4) - Pharmacological DVT Prophylaxis Contraindicated  Sequential Compression Devices Ordered  Patient Centered Rounds: I performed bedside rounds with nursing staff today  Discussions with Specialists or Other Care Team Provider: urology    Total Time Spent on Date of Encounter in care of patient: 45 minutes This time was spent on one or more of the following: performing physical exam; counseling and coordination of care; obtaining or reviewing history; documenting in the medical record; reviewing/ordering tests, medications or procedures; communicating with other healthcare professionals and discussing with patient's family/caregivers      Current Length of Stay: 8 day(s)  Current Patient Status: Inpatient   Certification Statement: The patient will continue to require additional inpatient hospital stay due to further management and prep for Cystoscopy  Discharge Plan: Anticipate discharge in 48-72 hrs to home  Code Status: Level 1 - Full Code    Subjective:   Patient feels well today  She states her symptoms all seem under control, she is also mild abdominal pain and occasional headaches, she is tolerating food well    Objective:     Vitals:   Temp (24hrs), Av 4 °F (36 9 °C), Min:98 °F (36 7 °C), Max:98 7 °F (37 1 °C)    Temp:  [98 °F (36 7 °C)-98 7 °F (37 1 °C)] 98 7 °F (37 1 °C)  HR:  [] 67  Resp:  [16-20] 16  BP: (130-140)/() 130/84  SpO2:  [94 %-96 %] 96 %  Body mass index is 25 9 kg/m²  Input and Output Summary (last 24 hours): Intake/Output Summary (Last 24 hours) at 2023 1252  Last data filed at 2023 0900  Gross per 24 hour   Intake 4900 ml   Output --   Net 4900 ml       Physical Exam:   Physical Exam  Vitals and nursing note reviewed  Constitutional:       General: She is not in acute distress  Appearance: She is well-developed  HENT:      Head: Normocephalic and atraumatic  Eyes:      Conjunctiva/sclera: Conjunctivae normal    Cardiovascular:      Rate and Rhythm: Normal rate and regular rhythm  Heart sounds: No murmur heard  Pulmonary:      Effort: Pulmonary effort is normal  No respiratory distress  Breath sounds: Normal breath sounds  Abdominal:      Palpations: Abdomen is soft  Tenderness: There is no abdominal tenderness  Comments: Mild abdominal tenderness in the epigastric to the periumbilical region   Musculoskeletal:         General: No swelling  Cervical back: Neck supple  Skin:     General: Skin is warm and dry  Capillary Refill: Capillary refill takes less than 2 seconds  Neurological:      Mental Status: She is alert     Psychiatric:         Mood and Affect: Mood normal           Additional Data:     Labs:  Results from last 7 days   Lab Units 23  0454 23  0533 23  0617   EOS PCT %  --   --  0   HEMATOCRIT % 42 3   < > 40 3   HEMOGLOBIN g/dL 13 6   < > 13 3   LYMPHS PCT %  --   --  12*   MONOS PCT %  --   --  7   NEUTROS PCT %  --   --  79*   PLATELETS Thousands/uL 555*   < > 517*   WBC Thousand/uL 19 32*   < > 14 39*    < > = values in this interval not displayed  Results from last 7 days   Lab Units 06/05/23  0533 06/04/23  0617   ANION GAP mmol/L 7 7   ALBUMIN g/dL  --  3 3*   ALK PHOS U/L  --  53   ALT U/L  --  38   AST U/L  --  16   BUN mg/dL 15 9   CALCIUM mg/dL 7 7* 7 6*   CHLORIDE mmol/L 102 104   CO2 mmol/L 29 29   CREATININE mg/dL 0 65 0 61   GLUCOSE RANDOM mg/dL 175* 131   POTASSIUM mmol/L 3 7 3 5   SODIUM mmol/L 138 140   TOTAL BILIRUBIN mg/dL  --  0 20                       Lines/Drains:  Invasive Devices     Peripheral Intravenous Line  Duration           Peripheral IV 06/06/23 Dorsal (posterior); Left Hand <1 day                      Imaging: No pertinent imaging reviewed      Recent Cultures (last 7 days):         Last 24 Hours Medication List:   Current Facility-Administered Medications   Medication Dose Route Frequency Provider Last Rate   • acetaminophen  650 mg Oral Q6H PRN William Vallejo DO     • albuterol  2 puff Inhalation Q4H PRN William Vallejo DO     • butalbital-acetaminophen-caffeine  1 tablet Oral Q8H PRN William Vallejo DO     • cholecalciferol  1,000 Units Oral BID William Vallejo DO     • diphenhydrAMINE  50 mg Intramuscular Q6H Gold Queen MD     • diphenhydrAMINE  50 mg Intravenous Q6H PRN Layo Lacey PA-C     • famotidine  20 mg Intravenous Q12H [facility] Layo Lacey PA-C     • HYDROmorphone  1 mg Intravenous Q4H PRN Layo Lacey PA-C     • levothyroxine  100 mcg Oral Early Morning Betzy Grigsby DO     • LORazepam  1 mg Intravenous Q4H PRN Heather Tripp DO     • methylPREDNISolone sodium succinate  20 mg Intravenous Q8H [facility] Pro Dominguez, DO     • ondansetron  4 mg Intravenous Q6H PRN Gold Queen MD     • pantoprazole  40 mg Intravenous Q24H [facility] Layo Lacey, RANDEE     • potassium-sodium phosphateS  1 tablet Oral TID With Meals Holly Moody MD     • predniSONE  20 mg Oral Daily PRN Melita Galan DO     • sodium chloride  75 mL/hr Intravenous Continuous Jamila Grant PA-C 75 mL/hr (06/06/23 5328)        Today, Patient Was Seen By: Annalisa Dominguez DO    **Please Note: This note may have been constructed using a voice recognition system  **

## 2023-06-06 NOTE — LETTER
June 6, 2023     Rivka Taylor DO  [address] [id number]    Patient: Rahul Ortega   YOB: 1975   Date of Visit: 6/6/2023       Dear Dr Timmy Samson:    Thank you for referring Nitza Cornejo to me for evaluation  Below are my notes for this consultation  If you have questions, please do not hesitate to call me  I look forward to following your patient along with you  Sincerely,        Janet Jones MD        CC: No Recipients    Janet Jones MD  6/6/2023  9:29 AM  Sign when Signing Visit  Virtual Regular Visit  It was my intent to perform this visit via video technology but the patient was not able to do a video connection so the visit was completed via audio telephone only  Verification of patient location:    Patient is located at Other in the following state in which I hold an active license [place]      Assessment/Plan:    Problem List Items Addressed This Visit    None  Visit Diagnoses       Microscopic hematuria    -  Primary                 Reason for visit is No chief complaint on file  Encounter provider Janet Jones MD    Provider located at [address] [place] [id number]      Recent Visits  No visits were found meeting these conditions  Showing recent visits within past 7 days and meeting all other requirements  Future Appointments  No visits were found meeting these conditions  Showing future appointments within next 150 days and meeting all other requirements       The patient was identified by name and date of birth  Rahul Ortega was informed that this is a telemedicine visit and that the visit is being conducted through Telephone  My office door was closed  No one else was in the room  She acknowledged consent and understanding of privacy and security of the video platform   The patient has agreed to participate and understands they can discontinue the visit at any time  Patient is aware this is a billable service  Frantz Christina is a 46 y o  female mast cell syndrome presents for cystoscopy because of past history of gross hematuria and chronic microscopic hematuria  CT scanning revealed bilateral nonobstructing punctate calculi with a hypodensity in the upper pole the right kidney too small to characterize and the urinary bladder without focal wall thickening or stones  Because of the patient's immunological reactivity cystoscopy will be performed under anesthesia in the operating room  The patient is aware of the possibility of bleeding infection stricturing perforation or immunologic reaction including anaphylaxis  The patient was admitted to [facility] with post colonoscopy dehydration requiring medical attention and rehydration  The patient was scheduled for cystoscopy under anesthesia at [address] [place] [address] today 6/6/2023 but because of her inpatient status at [address] [place] [address] this was canceled  I spoke to the patient in detail via telephone today and discussed rescheduling her cystoscopy that was planned for today  If operating room time is available in the next day or so at [facility] then cystoscopy can be performed with the previously ordered preoperative prep there with plans to keep the patient overnight for further hydration and observation in view of her mast cell syndrome  If this is not possible then the patient will be rescheduled for outpatient/overnight stay cystoscopy under anesthesia at [facility] in the near future        HPI     Past Medical History:   Diagnosis Date   • Anemia 2007   • Anxiety    • Asthma    • Bile duct stricture 2014    Sphincter of oddi dysfunction   • Chronic kidney disease    • Colon polyp 1998   • Crohn's colitis ([facility] )    • Deep vein thrombosis ([facility] )    • Disease of thyroid gland    • 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   • Generalized anxiety disorder 08/26/2017   • GERD (gastroesophageal reflux disease) 1995   • GI (gastrointestinal bleed) 1994   • Heart murmur    • Inflammatory bowel disease    • Irritable bowel syndrome 2016   • Lactose intolerance 1982   • Mast cell disease    • Migraine    • Myocardial infarction [facility])     NSTEMI  pt denies, documented in cardio note   • Pancreatitis     sphinger of    • Psychiatric disorder    • RA (rheumatoid arthritis) ([facility] )    • Seizures ([facility] )    • Ulcerative colitis ([facility] )    • Visual impairment        Past Surgical History:   Procedure Laterality Date   • ABDOMINAL SURGERY  2017   • APPENDECTOMY     • CHOLECYSTECTOMY     • COLONOSCOPY  2019   • EGD AND COLONOSCOPY N/A 09/12/2017    Procedure: EGD AND COLONOSCOPY;  Surgeon: Chidi Burnham MD;  Location: [facility]; Service: Gastroenterology   • ERCP N/A 09/15/2017    Procedure: ENDOSCOPIC RETROGRADE CHOLANGIOPANCREATOGRAPHY (ERCP); Surgeon: Candis Botello MD;  Location: [facility]; Service: Gastroenterology   • HYSTERECTOMY     • KNEE SURGERY Right    • LAPAROSCOPIC ENDOMETRIOSIS FULGURATION     • ORIF ANKLE FRACTURE Left    • NY ARTHRS KNE SURG W/MENISCECTOMY MED/LAT W/SHVG Left 05/12/2017    Procedure: POSSIBLE MEDIAL MENISECTOMY;  Surgeon: Lamar Mills MD;  Location: [facility];  Service: Orthopedics   • NY ARTHRS KNEE DEBRIDEMENT/SHAVING ARTCLR CRTLG Left 05/12/2017    Procedure: KNEE ARTHROSCOPY EVALUATION, CHONDROPLASTY;  Surgeon: Lamar Mills MD;  Location: [facility];  Service: Orthopedics   • NY LAPS ABD PRTM&OMENTUM DX W/WO SPEC BR/WA SPX N/A 12/12/2017    Procedure: LAPAROSCOPY DIAGNOSTIC  WITH LYSIS OF ADHESIONS;  Surgeon: Christal Benitez DO;  Location: [facility];  Service: General   • UPPER GASTROINTESTINAL ENDOSCOPY  2019       No current facility-administered medications for this visit  No current outpatient medications on file       Facility-Administered Medications Ordered in Other Visits   Medication Dose Route Frequency Provider Last Rate Last Admin   • acetaminophen (TYLENOL) tablet 650 mg  650 mg Oral Q6H PRN Mary Sober, DO       • albuterol (PROVENTIL HFA,VENTOLIN HFA) inhaler 2 puff  2 puff Inhalation Q4H PRN Mary Sober, DO       • butalbital-acetaminophen-caffeine (FIORICET,ESGIC) -40 mg per tablet 1 tablet  1 tablet Oral Q8H PRN Mary Sober, DO       • cholecalciferol (VITAMIN D) oral liquid 1,000 Units  1,000 Units Oral BID Betzy Grigsby, DO       • diphenhydrAMINE (BENADRYL) injection 50 mg  50 mg Intramuscular Q6H Alvaro Curtis MD   50 mg at 06/06/23 0534   • diphenhydrAMINE (BENADRYL) injection 50 mg  50 mg Intravenous Q6H PRN Jesus Verdugo PA-C   50 mg at 06/04/23 0931   • Famotidine (PF) (PEPCID) injection 20 mg  20 mg Intravenous Q12H [address] Jesus Verdugo PA-C   20 mg at 06/05/23 2113   • HYDROmorphone (DILAUDID) injection 1 mg  1 mg Intravenous Q4H PRN Jesus Verdugo PA-C   1 mg at 06/06/23 6691   • levothyroxine tablet 100 mcg  100 mcg Oral Early Morning Betzy Grigsby, DO       • LORazepam (ATIVAN) injection 1 mg  1 mg Intravenous Q4H PRN Pratik Tripp DO   1 mg at 06/06/23 0534   • methylPREDNISolone sodium succinate (Solu-MEDROL) injection 20 mg  20 mg Intravenous [facility] CASE GarciaC   20 mg at 06/06/23 0534   • ondansetron (ZOFRAN) injection 4 mg  4 mg Intravenous Q6H PRN Alvaro Curtis MD   4 mg at 06/06/23 6883   • pantoprazole (PROTONIX) injection 40 mg  40 mg Intravenous Q24H [address] Jesus Verdugo PA-C   40 mg at 06/05/23 0815   • potassium-sodium phosphateS (K-PHOS,PHOSPHA 250) -250 mg tablet 1 tablet  1 tablet Oral TID With Meals Alvaro Curtis MD       • predniSONE tablet 20 mg  20 mg Oral Daily PRN Mary Ruiz DO       • sodium chloride 0 9 % infusion  75 mL/hr Intravenous Continuous Adiel Patino PA-C 75 mL/hr at 06/06/23 0704 75 mL/hr at 06/06/23 0704        Allergies   Allergen Reactions   • Aimovig [Erenumab-Aooe] Anaphylaxis   • Amitriptyline Anaphylaxis     According to the patient she had nphylaxis   • Aspergillus Allergy Skin Test Other (See Comments), Hives and Swelling   • Azathioprine Other (See Comments) and Anaphylaxis     pancreatitis  According to patient she needed Epipen   • Banana - Food Allergy Itching, Swelling and Anaphylaxis   • Buspar [Buspirone] Hives and Shortness Of Breath   • Citric Acid-Polysorbate 80 Abdominal Pain, Anaphylaxis, Anxiety, Bradycardia, Diarrhea, Fatigue, GI Intolerance, Headache, Hives, Hyperactivity, Itching, Lip Swelling, Nausea Only, Palpitations, Rash, Shortness Of Breath, Tachycardia, Throat Swelling, Tongue Swelling and Vomiting   • Corn Dextrin Anaphylaxis     Any corn based products   • Eggs Or Egg-Derived Products - Food Allergy Anaphylaxis   • Epinephrine Anaphylaxis   • Erenumab Anaphylaxis     patient sent portal message stating she had anaphylaxis  patient sent portal message stating she had anaphylaxis     • Gadolinium Abdominal Pain, Anaphylaxis, Anxiety, Confusion, Delirium, Dizziness, Eye Swelling, Fatigue, GI Intolerance, Headache, Hives, Irritability, Itching, Lip Swelling, Nausea Only, Palpitations, Photosensitivity, Rash, Seizures, Shortness Of Breath, Swelling, Syncope, Throat Swelling, Tongue Swelling, Tremor, Visual Disturbance and Vomiting   • Gelatin - Food Allergy Anaphylaxis   • Heparin Hives     Painful welts    • Lactated Ringers Shortness Of Breath     Pt questions this allergy, pt has received LR multiple times without reaction   • Lavender Oil Anaphylaxis     Other reaction(s): anaphalxis   • Malto Dextrin [Dextrin] Anaphylaxis   • Other Anaphylaxis     Gel caps, maltodextrose, dextrose,grapes, lavender    • Penicillium Notatum Shortness Of Breath and Swelling   • Sumatriptan Anaphylaxis     Bradycardia, sob, back pressure, head pain,throat tightness  According to the patient she had anphylaxis   • Ustekinumab Anaphylaxis   • Contrast [Iodinated Contrast Media] Other (See Comments)     Pt states needs to be "premedicated prior to injection   • Corn Oil - Food Allergy - Food Allergy Hives   • Humira [Adalimumab] Other (See Comments)     \"I develop drug induced lupus\"   • Ibuprofen Hives and Throat Swelling   • Polyethylene Glycol Diarrhea and Vomiting   • Red Dye - Food Allergy Other (See Comments)     intolerance   • Remicade [Infliximab] Other (See Comments)     \"I develop drug induced lupus\"   • Soy Allergy - Food Allergy Other (See Comments)   • Sulfa Antibiotics Hives and Other (See Comments)   • Sulfate Hives   • Sulfites - Food Allergy Hives   • Chlorhexidine Itching   • Freederm Adhesive Remover [New Skin] Rash   • Histamine Hives, Rash and Other (See Comments)     Intolerance         Review of Systems    Video Exam    There were no vitals filed for this visit      Physical Exam     Visit Time  Total Visit Duration: 15      "

## 2023-06-06 NOTE — PROGRESS NOTES
Virtual Regular Visit  It was my intent to perform this visit via video technology but the patient was not able to do a video connection so the visit was completed via audio telephone only  Verification of patient location:    Patient is located at Other in the following state in which I hold an active license [place]      Assessment/Plan:    Problem List Items Addressed This Visit    None  Visit Diagnoses     Microscopic hematuria    -  Primary               Reason for visit is No chief complaint on file  Encounter provider Olman Callejas MD    Provider located at [address] [place] [id number]      Recent Visits  No visits were found meeting these conditions  Showing recent visits within past 7 days and meeting all other requirements  Future Appointments  No visits were found meeting these conditions  Showing future appointments within next 150 days and meeting all other requirements       The patient was identified by name and date of birth  Abiodun Jay was informed that this is a telemedicine visit and that the visit is being conducted through Telephone  My office door was closed  No one else was in the room  She acknowledged consent and understanding of privacy and security of the video platform  The patient has agreed to participate and understands they can discontinue the visit at any time  Patient is aware this is a billable service  Subjective  Abiodun Jay is a 46 y o  female mast cell syndrome presents for cystoscopy because of past history of gross hematuria and chronic microscopic hematuria  CT scanning revealed bilateral nonobstructing punctate calculi with a hypodensity in the upper pole the right kidney too small to characterize and the urinary bladder without focal wall thickening or stones    Because of the patient's immunological reactivity cystoscopy will be performed under anesthesia in the operating room  The patient is aware of the possibility of bleeding infection stricturing perforation or immunologic reaction including anaphylaxis  The patient was admitted to [facility] with post colonoscopy dehydration requiring medical attention and rehydration  The patient was scheduled for cystoscopy under anesthesia at [facility] today 6/6/2023 but because of her inpatient status at [facility] this was canceled  I spoke to the patient in detail via telephone today and discussed rescheduling her cystoscopy that was planned for today  If operating room time is available in the next day or so at [facility] then cystoscopy can be performed with the previously ordered preoperative prep there with plans to keep the patient overnight for further hydration and observation in view of her mast cell syndrome  If this is not possible then the patient will be rescheduled for outpatient/overnight stay cystoscopy under anesthesia at [facility] in the near future        HPI     Past Medical History:   Diagnosis Date   • Anemia 2007   • Anxiety    • Asthma    • Bile duct stricture 2014    Sphincter of oddi dysfunction   • Chronic kidney disease    • Colon polyp 1998   • Crohn's colitis ([facility] [address] )    • Deep vein thrombosis ([facility] [address] )    • Disease of thyroid gland    • 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   • Generalized anxiety disorder 08/26/2017   • GERD (gastroesophageal reflux disease) 1995   • GI (gastrointestinal bleed) 1994   • Heart murmur    • Inflammatory bowel disease    • Irritable bowel syndrome 2016   • Lactose intolerance 1982   • Mast cell disease    • Migraine    • Myocardial infarction [facility])     NSTEMI  pt denies, documented in cardio note   • Pancreatitis     sphinger of    • Psychiatric disorder    • RA (rheumatoid arthritis) ([facility] [address] )    • Seizures ([address] )    • Ulcerative colitis ([facility] [address] )    • Visual impairment        Past Surgical History:   Procedure Laterality Date   • ABDOMINAL SURGERY  2017   • APPENDECTOMY     • CHOLECYSTECTOMY     • COLONOSCOPY  2019   • EGD AND COLONOSCOPY N/A 09/12/2017    Procedure: EGD AND COLONOSCOPY;  Surgeon: Mayur Avila MD;  Location: [facility]; Service: Gastroenterology   • ERCP N/A 09/15/2017    Procedure: ENDOSCOPIC RETROGRADE CHOLANGIOPANCREATOGRAPHY (ERCP); Surgeon: David Zavaleta MD;  Location: [facility]; Service: Gastroenterology   • HYSTERECTOMY     • KNEE SURGERY Right    • LAPAROSCOPIC ENDOMETRIOSIS FULGURATION     • ORIF ANKLE FRACTURE Left    • VT ARTHRS KNE SURG W/MENISCECTOMY MED/LAT W/SHVG Left 05/12/2017    Procedure: POSSIBLE MEDIAL MENISECTOMY;  Surgeon: Harish De Santiago MD;  Location: [facility];  Service: Orthopedics   • VT ARTHRS KNEE DEBRIDEMENT/SHAVING ARTCLR CRTLG Left 05/12/2017    Procedure: KNEE ARTHROSCOPY EVALUATION, CHONDROPLASTY;  Surgeon: Harish De Santiago MD;  Location: [facility];  Service: Orthopedics   • VT LAPS ABD PRTM&OMENTUM DX W/WO SPEC BR/WA SPX N/A 12/12/2017    Procedure: LAPAROSCOPY DIAGNOSTIC  WITH LYSIS OF ADHESIONS;  Surgeon: Jozef Salguero DO;  Location: [facility];  Service: General   • UPPER GASTROINTESTINAL ENDOSCOPY  2019       No current facility-administered medications for this visit  No current outpatient medications on file       Facility-Administered Medications Ordered in Other Visits   Medication Dose Route Frequency Provider Last Rate Last Admin   • acetaminophen (TYLENOL) tablet 650 mg  650 mg Oral Q6H PRN Mary Sober, DO       • albuterol (PROVENTIL HFA,VENTOLIN HFA) inhaler 2 puff  2 puff Inhalation Q4H PRN Mary Sober, DO       • butalbital-acetaminophen-caffeine (FIORICET,ESGIC) -40 mg per tablet 1 tablet  1 tablet Oral Q8H PRN Mary Sober, DO       • cholecalciferol (VITAMIN D) oral liquid 1,000 Units  1,000 Units Oral BID Betzy Grigsby DO       • diphenhydrAMINE (BENADRYL) injection 50 mg  50 mg Intramuscular Q6H Alvaro Curtis MD   50 mg at 06/06/23 0534   • diphenhydrAMINE (BENADRYL) injection 50 mg  50 mg Intravenous Q6H PRN Lisa Pedro PA-C   50 mg at 06/04/23 0931   • Famotidine (PF) (PEPCID) injection 20 mg  20 mg Intravenous Q12H [address] Lisa Pedro, PA-C   20 mg at 06/05/23 2113   • HYDROmorphone (DILAUDID) injection 1 mg  1 mg Intravenous Q4H PRN Lisa Vasquez PA-C   1 mg at 06/06/23 9152   • levothyroxine tablet 100 mcg  100 mcg Oral Early Morning Betzy Grigsby DO       • LORazepam (ATIVAN) injection 1 mg  1 mg Intravenous Q4H PRN Sarah Tripp DO   1 mg at 06/06/23 9781   • methylPREDNISolone sodium succinate (Solu-MEDROL) injection 20 mg  20 mg Intravenous [facility] Vanessa Coyle PA-C   20 mg at 06/06/23 0534   • ondansetron (ZOFRAN) injection 4 mg  4 mg Intravenous Q6H PRN Marylee Simmonds, MD   4 mg at 06/06/23 9276   • pantoprazole (PROTONIX) injection 40 mg  40 mg Intravenous Q24H [address] Lisa Vasquez PAAnthonyC   40 mg at 06/05/23 0815   • potassium-sodium phosphateS (K-PHOS,PHOSPHA 250) -250 mg tablet 1 tablet  1 tablet Oral TID With Meals Marylee Simmonds, MD       • predniSONE tablet 20 mg  20 mg Oral Daily PRN Bob Law DO       • sodium chloride 0 9 % infusion  75 mL/hr Intravenous Continuous Vanessa Coyle PA-C 75 mL/hr at 06/06/23 0704 75 mL/hr at 06/06/23 0704        Allergies   Allergen Reactions   • Aimovig [Erenumab-Aooe] Anaphylaxis   • Amitriptyline Anaphylaxis     According to the patient she had nphylaxis   • Aspergillus Allergy Skin Test Other (See Comments), Hives and Swelling   • Azathioprine Other (See Comments) and Anaphylaxis     pancreatitis  According to patient she needed Epipen   • Banana - Food Allergy Itching, Swelling and Anaphylaxis   • Buspar [Buspirone] Hives and Shortness Of Breath   • Citric Acid-Polysorbate 80 Abdominal Pain, Anaphylaxis, Anxiety, Bradycardia, Diarrhea, Fatigue, GI Intolerance, Headache, Hives, Hyperactivity, Itching, Lip Swelling, Nausea Only, Palpitations, Rash, Shortness Of "Breath, Tachycardia, Throat Swelling, Tongue Swelling and Vomiting   • Corn Dextrin Anaphylaxis     Any corn based products   • Eggs Or Egg-Derived Products - Food Allergy Anaphylaxis   • Epinephrine Anaphylaxis   • Erenumab Anaphylaxis     patient sent portal message stating she had anaphylaxis  patient sent portal message stating she had anaphylaxis     • Gadolinium Abdominal Pain, Anaphylaxis, Anxiety, Confusion, Delirium, Dizziness, Eye Swelling, Fatigue, GI Intolerance, Headache, Hives, Irritability, Itching, Lip Swelling, Nausea Only, Palpitations, Photosensitivity, Rash, Seizures, Shortness Of Breath, Swelling, Syncope, Throat Swelling, Tongue Swelling, Tremor, Visual Disturbance and Vomiting   • Gelatin - Food Allergy Anaphylaxis   • Heparin Hives     Painful welts    • Lactated Ringers Shortness Of Breath     Pt questions this allergy, pt has received LR multiple times without reaction   • Lavender Oil Anaphylaxis     Other reaction(s): anaphalxis   • Malto Dextrin [Dextrin] Anaphylaxis   • Other Anaphylaxis     Gel caps, maltodextrose, dextrose,grapes, lavender    • Penicillium Notatum Shortness Of Breath and Swelling   • Sumatriptan Anaphylaxis     Bradycardia, sob, back pressure, head pain,throat tightness  According to the patient she had anphylaxis   • Ustekinumab Anaphylaxis   • Contrast [Iodinated Contrast Media] Other (See Comments)     Pt states needs to be premedicated prior to injection   • Corn Oil - Food Allergy - Food Allergy Hives   • Humira [Adalimumab] Other (See Comments)     \"I develop drug induced lupus\"   • Ibuprofen Hives and Throat Swelling   • Polyethylene Glycol Diarrhea and Vomiting   • Red Dye - Food Allergy Other (See Comments)     intolerance   • Remicade [Infliximab] Other (See Comments)     \"I develop drug induced lupus\"   • Soy Allergy - Food Allergy Other (See Comments)   • Sulfa Antibiotics Hives and Other (See Comments)   • Sulfate Hives   • Sulfites - Food Allergy Hives " • Chlorhexidine Itching   • Freederm Adhesive Remover [New Skin] Rash   • Histamine Hives, Rash and Other (See Comments)     Intolerance         Review of Systems    Video Exam    There were no vitals filed for this visit      Physical Exam     Visit Time  Total Visit Duration: 15

## 2023-06-06 NOTE — PROGRESS NOTES
Progress Note- Mary Goldberg 46 y o  female MRN: [id number]    Unit/Bed#: 908-25 Encounter: [id number]      Assessment and Plan:    45 y/o F w/ pmhx sig for IBD (Crohn's vs indeterminate colitis), previously on anti-TNF therapy but complicated by drug-induced lupus, currently on Stelara, mast cell activation syndrome, who was admitted with nausea, vomiting, and diarrhea  Previous colonoscopy in 04/2023 with active colitis on biopsy and has been off Stelara for some time now  She was started on solumedrol with dose adjustment recommended to coincide with therapeutic IBD dosing at 20 TID  the plan as of last week was to transition patient to oral prednisone at 40 mg on 6/3/2023, though this was not completed  Patient is currently in discussion with urology as to the timing of her cystoscopy for her urinary symptoms  She notes soft brown bowel movements daily at this time, is no longer having diarrhea  She does still complain of some lower abdominal pain however  Nausea, non-bloody emesis, non-bloody diarrhea  Inflammatory bowel disease     · Pt is currently on Solumedrol at 20mg TID for presumed Crohn's dz flare (active dz noted on colonoscopy in 05/2023) as well as for mast cell activation syndrome (also taking benadryl, protonix, pepcid)  · We do recommend continuing with serial abdominal examinations and monitoring stool output  · As previously discussed, recommend transition to oral prednisone 40mg daily until pt follows up with primary Gastroenterologist Dr Ambika Carmona  Pt does bring in home medication given concern for preservative intolerance with hospital formulary medications  · Okay for diet as is tolerated from GI standpoint  · She will require short interval follow up with GI to develop a long term maintenance plan for her IBD  · Additional supportive care to include IV fluid hydration and electrolyte repletion per primary team      GI will be available should any new issues arise  ______________________________________________________________________    Subjective:     Patient is a 46 y o  female with past medical history significant for IBD (Crohn's vs indeterminate colitis), previously on anti-TNF therapy but complicated by drug-induced lupus, currently on Stelara, mast cell activation syndrome, who was admitted with nausea, vomiting, and diarrhea  Started on solumedrol and additional medications per mast cell protocol  Interval events:     Pt has been unable to transition to oral steroids due to logistics of obtaining and keeping home medications at hospital  She is still dealing with some generalized abdominal cramping but notes bowel habits are now soft and brown daily, without diarrhea or blood  Afebrile in past 24 hours  Concerned about plans for cystoscopy       Medication Administration - last 24 hours from 06/05/2023 1236 to 06/06/2023 1236       Date/Time Order Dose Route Action Action by     06/06/2023 1004 EDT cholecalciferol (VITAMIN D) oral liquid 1,000 Units 1,000 Units Oral Refused Flip Barillas RN     06/05/2023 2049 EDT cholecalciferol (VITAMIN D) oral liquid 1,000 Units 1,000 Units Oral Refused Moises Alvarado RN     06/06/2023 7793 EDT levothyroxine tablet 100 mcg 100 mcg Oral Refused Moises Alvarado RN     06/06/2023 7130 EDT ondansetron (ZOFRAN) injection 4 mg 4 mg Intravenous Given Flip Barillas RN     06/05/2023 1817 EDT ondansetron (ZOFRAN) injection 4 mg 4 mg Intravenous Given Elizabeth Hernandez RN     06/06/2023 0534 EDT diphenhydrAMINE (BENADRYL) injection 50 mg 50 mg Intramuscular Given Moises Alvarado RN     06/06/2023 0046 EDT diphenhydrAMINE (BENADRYL) injection 50 mg 50 mg Intramuscular Not Given Moises Alvarado RN     06/05/2023 1817 EDT diphenhydrAMINE (BENADRYL) injection 50 mg 50 mg Intramuscular Given Elizabeth Hernandez RN     06/06/2023 0534 EDT LORazepam (ATIVAN) injection 1 mg 1 mg Intravenous Given Moises Alvarado RN 06/05/2023 1817 EDT LORazepam (ATIVAN) injection 1 mg 1 mg Intravenous Given Alfred Jorge L, RN     06/06/2023 1217 EDT potassium-sodium phosphateS (K-PHOS,PHOSPHA 250) -250 mg tablet 1 tablet 1 tablet Oral Not Given Zeus King, ROLLY     06/06/2023 1005 EDT potassium-sodium phosphateS (K-PHOS,PHOSPHA 250) -250 mg tablet 1 tablet 1 tablet Oral Refused Zeus King, ROLLY     06/05/2023 1751 EDT potassium-sodium phosphateS (K-PHOS,PHOSPHA 250) -250 mg tablet 1 tablet 1 tablet Oral Refused Zeus King, ROLLY     06/06/2023 1024 EDT pantoprazole (PROTONIX) injection 40 mg 40 mg Intravenous Given Zeus King, RN     06/06/2023 1024 EDT Famotidine (PF) (PEPCID) injection 20 mg 20 mg Intravenous Given Zeus King, RN     06/05/2023 2113 EDT Famotidine (PF) (PEPCID) injection 20 mg 20 mg Intravenous Given Ronak Sarthak, RN     06/06/2023 1024 EDT HYDROmorphone (DILAUDID) injection 1 mg 1 mg Intravenous Given Zeus King, RN     06/06/2023 9387 EDT HYDROmorphone (DILAUDID) injection 1 mg 1 mg Intravenous Given Ronak Sink, RN     06/05/2023 2114 EDT HYDROmorphone (DILAUDID) injection 1 mg 1 mg Intravenous Given Ronak Sink, RN     06/05/2023 1442 EDT HYDROmorphone (DILAUDID) injection 1 mg 1 mg Intravenous Given Zeus King, RN     06/06/2023 9417 EDT methylPREDNISolone sodium succinate (Solu-MEDROL) injection 20 mg 20 mg Intravenous Given Ronak Sink, RN     06/05/2023 2113 EDT methylPREDNISolone sodium succinate (Solu-MEDROL) injection 20 mg 20 mg Intravenous Given Ronak Sink, RN     06/05/2023 1442 EDT methylPREDNISolone sodium succinate (Solu-MEDROL) injection 20 mg 20 mg Intravenous Given Zeus King, RN     06/06/2023 9571 EDT sodium chloride 0 9 % infusion 75 mL/hr Intravenous New Bag Zeus King RN        Review of Systems   Otherwise Per HPI    Objective:     Vitals: Blood pressure 130/84, pulse 67, temperature 98 7 °F (37 1 °C), resp  "rate 16, height 5' 5\" (1 651 m), weight 70 6 kg (155 lb 10 3 oz), SpO2 96 %, not currently breastfeeding  ,Body mass index is 25 9 kg/m²  Intake/Output Summary (Last 24 hours) at 6/6/2023 1236  Last data filed at 6/6/2023 0900  Gross per 24 hour   Intake 4900 ml   Output --   Net 4900 ml       Physical Exam  Vitals and nursing note reviewed  Constitutional:       Appearance: Normal appearance  HENT:      Head: Normocephalic and atraumatic  Pulmonary:      Effort: Pulmonary effort is normal  No respiratory distress  Abdominal:      General: Bowel sounds are normal  There is no distension  Palpations: There is no mass  Tenderness: There is abdominal tenderness  There is no guarding or rebound  Skin:     General: Skin is warm and dry  Coloration: Skin is not jaundiced  Neurological:      General: No focal deficit present  Mental Status: She is alert and oriented to person, place, and time  Psychiatric:         Mood and Affect: Mood is anxious  Invasive Devices     Peripheral Intravenous Line  Duration           Peripheral IV 06/06/23 Dorsal (posterior); Left Hand <1 day              Lab Results:  No results displayed because visit has over 200 results  Imaging Studies: I have personally reviewed pertinent imaging studies  Ashwini Naqvi PA-C    **Please note:  Dictation voice to text software may have been used in the creation of this record  Occasional wrong word or “sound alike” substitutions may have occurred due to the inherent limitations of voice recognition software  Read the chart carefully and recognize, using context, where substitutions have occurred  **    "

## 2023-06-06 NOTE — PLAN OF CARE
Problem: MOBILITY - ADULT  Goal: Maintain or return to baseline ADL function  Description: INTERVENTIONS:  -  Assess patient's ability to carry out ADLs; assess patient's baseline for ADL function and identify physical deficits which impact ability to perform ADLs (bathing, care of mouth/teeth, toileting, grooming, dressing, etc )  - Assess/evaluate cause of self-care deficits   - Assess range of motion  - Assess patient's mobility; develop plan if impaired  - Assess patient's need for assistive devices and provide as appropriate  - Encourage maximum independence but intervene and supervise when necessary  - Involve family in performance of ADLs  - Assess for home care needs following discharge   - Consider OT consult to assist with ADL evaluation and planning for discharge  - Provide patient education as appropriate  Outcome: Progressing  Goal: Maintains/Returns to pre admission functional level  Description: INTERVENTIONS:  - Perform BMAT or MOVE assessment daily    - Set and communicate daily mobility goal to care team and patient/family/caregiver  - Collaborate with rehabilitation services on mobility goals if consulted  - Perform Range of Motion 3-4 times a day  - Reposition patient every 2 hours    - Dangle patient 3 times a day  - Stand patient 3 times a day  - Ambulate patient 3 times a day  - Out of bed to chair 3 times a day   - Out of bed for meals 3 times a day  - Out of bed for toileting  - Record patient progress and toleration of activity level   Outcome: Progressing     Problem: GASTROINTESTINAL - ADULT  Goal: Minimal or absence of nausea and/or vomiting  Description: INTERVENTIONS:  - Administer IV fluids if ordered to ensure adequate hydration  - Maintain NPO status until nausea and vomiting are resolved  - Nasogastric tube if ordered  - Administer ordered antiemetic medications as needed  - Provide nonpharmacologic comfort measures as appropriate  - Advance diet as tolerated, if ordered  - Consider nutrition services referral to assist patient with adequate nutrition and appropriate food choices  Outcome: Progressing  Goal: Maintains or returns to baseline bowel function  Description: INTERVENTIONS:  - Assess bowel function  - Encourage oral fluids to ensure adequate hydration  - Administer IV fluids if ordered to ensure adequate hydration  - Administer ordered medications as needed  - Encourage mobilization and activity  - Consider nutritional services referral to assist patient with adequate nutrition and appropriate food choices  Outcome: Progressing  Goal: Maintains adequate nutritional intake  Description: INTERVENTIONS:  - Monitor percentage of each meal consumed  - Identify factors contributing to decreased intake, treat as appropriate  - Assist with meals as needed  - Monitor I&O, weight, and lab values if indicated  - Obtain nutrition services referral as needed  Outcome: Progressing     Problem: METABOLIC, FLUID AND ELECTROLYTES - ADULT  Goal: Electrolytes maintained within normal limits  Description: INTERVENTIONS:  - Monitor labs and assess patient for signs and symptoms of electrolyte imbalances  - Administer electrolyte replacement as ordered  - Monitor response to electrolyte replacements, including repeat lab results as appropriate  - Instruct patient on fluid and nutrition as appropriate  Outcome: Progressing  Goal: Fluid balance maintained  Description: INTERVENTIONS:  - Monitor labs   - Monitor I/O and WT  - Instruct patient on fluid and nutrition as appropriate  - Assess for signs & symptoms of volume excess or deficit  Outcome: Progressing     Problem: PAIN - ADULT  Goal: Verbalizes/displays adequate comfort level or baseline comfort level  Description: Interventions:  - Encourage patient to monitor pain and request assistance  - Assess pain using appropriate pain scale  - Administer analgesics based on type and severity of pain and evaluate response  - Implement non-pharmacological measures as appropriate and evaluate response  - Consider cultural and social influences on pain and pain management  - Notify physician/advanced practitioner if interventions unsuccessful or patient reports new pain  Outcome: Progressing     Problem: INFECTION - ADULT  Goal: Absence or prevention of progression during hospitalization  Description: INTERVENTIONS:  - Assess and monitor for signs and symptoms of infection  - Monitor lab/diagnostic results  - Monitor all insertion sites, i e  indwelling lines, tubes, and drains  - Monitor endotracheal if appropriate and nasal secretions for changes in amount and color  - [place] appropriate cooling/warming therapies per order  - Administer medications as ordered  - Instruct and encourage patient and family to use good hand hygiene technique  - Identify and instruct in appropriate isolation precautions for identified infection/condition  Outcome: Progressing     Problem: SAFETY ADULT  Goal: Maintain or return to baseline ADL function  Description: INTERVENTIONS:  -  Assess patient's ability to carry out ADLs; assess patient's baseline for ADL function and identify physical deficits which impact ability to perform ADLs (bathing, care of mouth/teeth, toileting, grooming, dressing, etc )  - Assess/evaluate cause of self-care deficits   - Assess range of motion  - Assess patient's mobility; develop plan if impaired  - Assess patient's need for assistive devices and provide as appropriate  - Encourage maximum independence but intervene and supervise when necessary  - Involve family in performance of ADLs  - Assess for home care needs following discharge   - Consider OT consult to assist with ADL evaluation and planning for discharge  - Provide patient education as appropriate  Outcome: Progressing  Goal: Maintains/Returns to pre admission functional level  Description: INTERVENTIONS:  - Perform BMAT or MOVE assessment daily    - Set and communicate daily mobility goal to care team and patient/family/caregiver  - Collaborate with rehabilitation services on mobility goals if consulted  - Perform Range of Motion 3-4 times a day  - Reposition patient every 2 hours    - Dangle patient 3 times a day  - Stand patient 3 times a day  - Ambulate patient 3 times a day  - Out of bed to chair 3 times a day   - Out of bed for meals 3 times a day  - Out of bed for toileting  - Record patient progress and toleration of activity level   Outcome: Progressing  Goal: Patient will remain free of falls  Description: INTERVENTIONS:  - Educate patient/family on patient safety including physical limitations  - Instruct patient to call for assistance with activity   - Consult OT/PT to assist with strengthening/mobility   - Keep Call bell within reach  - Keep bed low and locked with side rails adjusted as appropriate  - Keep care items and personal belongings within reach  - Initiate and maintain comfort rounds  - Make Fall Risk Sign visible to staff  - Offer Toileting every 2 Hours, in advance of need  - Initiate/Maintain bed/ chair alarm  - Apply yellow socks and bracelet for high fall risk patients  - Consider moving patient to room near nurses station  Outcome: Progressing     Problem: DISCHARGE PLANNING  Goal: Discharge to home or other facility with appropriate resources  Description: INTERVENTIONS:  - Identify barriers to discharge w/patient and caregiver  - Arrange for needed discharge resources and transportation as appropriate  - Identify discharge learning needs (meds, wound care, etc )  - Arrange for interpretive services to assist at discharge as needed  - Refer to Case Management Department for coordinating discharge planning if the patient needs post-hospital services based on physician/advanced practitioner order or complex needs related to functional status, cognitive ability, or social support system  Outcome: Progressing     Problem: Knowledge Deficit  Goal: Patient/family/caregiver demonstrates understanding of disease process, treatment plan, medications, and discharge instructions  Description: Complete learning assessment and assess knowledge base  Interventions:  - Provide teaching at level of understanding  - Provide teaching via preferred learning methods  Outcome: Progressing     Problem: Nutrition/Hydration-ADULT  Goal: Nutrient/Hydration intake appropriate for improving, restoring or maintaining nutritional needs  Description: Monitor and assess patient's nutrition/hydration status for malnutrition  Collaborate with interdisciplinary team and initiate plan and interventions as ordered  Monitor patient's weight and dietary intake as ordered or per policy  Utilize nutrition screening tool and intervene as necessary  Determine patient's food preferences and provide high-protein, high-caloric foods as appropriate       INTERVENTIONS:  - Monitor oral intake, urinary output, labs, and treatment plans  - Assess nutrition and hydration status and recommend course of action  - Evaluate amount of meals eaten  - Assist patient with eating if necessary   - Allow adequate time for meals  - Recommend/ encourage appropriate diets, oral nutritional supplements, and vitamin/mineral supplements  - Order, calculate, and assess calorie counts as needed  - Recommend, monitor, and adjust tube feedings and TPN/PPN based on assessed needs  - Assess need for intravenous fluids  - Provide specific nutrition/hydration education as appropriate  - Include patient/family/caregiver in decisions related to nutrition  Outcome: Progressing

## 2023-06-06 NOTE — TELEPHONE ENCOUNTER
Per Dr Beth Arias nurse I called pt to let her know  approved virtual visit  Advised pt dr will be calling her to do phone visit

## 2023-06-07 ENCOUNTER — ANESTHESIA EVENT (INPATIENT)
Dept: PERIOP | Facility: HOSPITAL | Age: 48
End: 2023-06-07
Payer: COMMERCIAL

## 2023-06-07 ENCOUNTER — HOSPITAL ENCOUNTER (OUTPATIENT)
Dept: INFUSION CENTER | Facility: HOSPITAL | Age: 48
Discharge: HOME | End: 2023-06-07
Attending: INTERNAL MEDICINE

## 2023-06-07 PROBLEM — R63.8 DECREASED ORAL INTAKE: Status: RESOLVED | Noted: 2023-05-29 | Resolved: 2023-06-07

## 2023-06-07 LAB — LIPASE SERPL-CCNC: 27 U/L (ref 11–82)

## 2023-06-07 PROCEDURE — 99233 SBSQ HOSP IP/OBS HIGH 50: CPT | Performed by: HOSPITALIST

## 2023-06-07 PROCEDURE — C9113 INJ PANTOPRAZOLE SODIUM, VIA: HCPCS | Performed by: PHYSICIAN ASSISTANT

## 2023-06-07 PROCEDURE — 83690 ASSAY OF LIPASE: CPT | Performed by: HOSPITALIST

## 2023-06-07 RX ORDER — METHYLPREDNISOLONE SODIUM SUCCINATE 40 MG/ML
40 INJECTION, POWDER, LYOPHILIZED, FOR SOLUTION INTRAMUSCULAR; INTRAVENOUS ONCE AS NEEDED
Status: DISCONTINUED | OUTPATIENT
Start: 2023-06-08 | End: 2023-06-09

## 2023-06-07 RX ORDER — DIPHENHYDRAMINE HYDROCHLORIDE 50 MG/ML
50 INJECTION INTRAMUSCULAR; INTRAVENOUS ONCE AS NEEDED
Status: DISCONTINUED | OUTPATIENT
Start: 2023-06-08 | End: 2023-06-09

## 2023-06-07 RX ADMIN — PANTOPRAZOLE SODIUM 40 MG: 40 INJECTION, POWDER, FOR SOLUTION INTRAVENOUS at 09:47

## 2023-06-07 RX ADMIN — ONDANSETRON 4 MG: 2 INJECTION INTRAMUSCULAR; INTRAVENOUS at 21:52

## 2023-06-07 RX ADMIN — METHYLPREDNISOLONE SODIUM SUCCINATE 20 MG: 40 INJECTION, POWDER, FOR SOLUTION INTRAMUSCULAR; INTRAVENOUS at 14:03

## 2023-06-07 RX ADMIN — SODIUM CHLORIDE 75 ML/HR: 0.9 INJECTION, SOLUTION INTRAVENOUS at 21:53

## 2023-06-07 RX ADMIN — FAMOTIDINE 20 MG: 10 INJECTION, SOLUTION INTRAVENOUS at 21:52

## 2023-06-07 RX ADMIN — METHYLPREDNISOLONE SODIUM SUCCINATE 20 MG: 40 INJECTION, POWDER, FOR SOLUTION INTRAMUSCULAR; INTRAVENOUS at 05:16

## 2023-06-07 RX ADMIN — METHYLPREDNISOLONE SODIUM SUCCINATE 20 MG: 40 INJECTION, POWDER, FOR SOLUTION INTRAMUSCULAR; INTRAVENOUS at 21:52

## 2023-06-07 RX ADMIN — LORAZEPAM 1 MG: 2 INJECTION INTRAMUSCULAR; INTRAVENOUS at 11:27

## 2023-06-07 RX ADMIN — FAMOTIDINE 20 MG: 10 INJECTION, SOLUTION INTRAVENOUS at 09:47

## 2023-06-07 RX ADMIN — DIPHENHYDRAMINE HYDROCHLORIDE 50 MG: 50 INJECTION, SOLUTION INTRAMUSCULAR; INTRAVENOUS at 11:27

## 2023-06-07 RX ADMIN — DIPHENHYDRAMINE HYDROCHLORIDE 50 MG: 50 INJECTION, SOLUTION INTRAMUSCULAR; INTRAVENOUS at 23:55

## 2023-06-07 RX ADMIN — HYDROMORPHONE HYDROCHLORIDE 1 MG: 1 INJECTION, SOLUTION INTRAMUSCULAR; INTRAVENOUS; SUBCUTANEOUS at 16:10

## 2023-06-07 RX ADMIN — DIPHENHYDRAMINE HYDROCHLORIDE 50 MG: 50 INJECTION, SOLUTION INTRAMUSCULAR; INTRAVENOUS at 05:16

## 2023-06-07 RX ADMIN — HYDROMORPHONE HYDROCHLORIDE 1 MG: 1 INJECTION, SOLUTION INTRAMUSCULAR; INTRAVENOUS; SUBCUTANEOUS at 09:54

## 2023-06-07 RX ADMIN — LORAZEPAM 1 MG: 2 INJECTION INTRAMUSCULAR; INTRAVENOUS at 17:28

## 2023-06-07 RX ADMIN — HYDROMORPHONE HYDROCHLORIDE 1 MG: 1 INJECTION, SOLUTION INTRAMUSCULAR; INTRAVENOUS; SUBCUTANEOUS at 21:53

## 2023-06-07 RX ADMIN — LORAZEPAM 1 MG: 2 INJECTION INTRAMUSCULAR; INTRAVENOUS at 05:16

## 2023-06-07 RX ADMIN — ONDANSETRON 4 MG: 2 INJECTION INTRAMUSCULAR; INTRAVENOUS at 14:03

## 2023-06-07 RX ADMIN — HYDROMORPHONE HYDROCHLORIDE 1 MG: 1 INJECTION, SOLUTION INTRAMUSCULAR; INTRAVENOUS; SUBCUTANEOUS at 04:24

## 2023-06-07 RX ADMIN — DIPHENHYDRAMINE HYDROCHLORIDE 50 MG: 50 INJECTION, SOLUTION INTRAMUSCULAR; INTRAVENOUS at 17:25

## 2023-06-07 NOTE — PLAN OF CARE
Problem: GASTROINTESTINAL - ADULT  Goal: Minimal or absence of nausea and/or vomiting  Description: INTERVENTIONS:  - Administer IV fluids if ordered to ensure adequate hydration  - Maintain NPO status until nausea and vomiting are resolved  - Nasogastric tube if ordered  - Administer ordered antiemetic medications as needed  - Provide nonpharmacologic comfort measures as appropriate  - Advance diet as tolerated, if ordered  - Consider nutrition services referral to assist patient with adequate nutrition and appropriate food choices  Outcome: Progressing  Goal: Maintains or returns to baseline bowel function  Description: INTERVENTIONS:  - Assess bowel function  - Encourage oral fluids to ensure adequate hydration  - Administer IV fluids if ordered to ensure adequate hydration  - Administer ordered medications as needed  - Encourage mobilization and activity  - Consider nutritional services referral to assist patient with adequate nutrition and appropriate food choices  Outcome: Progressing  Goal: Maintains adequate nutritional intake  Description: INTERVENTIONS:  - Monitor percentage of each meal consumed  - Identify factors contributing to decreased intake, treat as appropriate  - Assist with meals as needed  - Monitor I&O, weight, and lab values if indicated  - Obtain nutrition services referral as needed  Outcome: Progressing     Problem: METABOLIC, FLUID AND ELECTROLYTES - ADULT  Goal: Electrolytes maintained within normal limits  Description: INTERVENTIONS:  - Monitor labs and assess patient for signs and symptoms of electrolyte imbalances  - Administer electrolyte replacement as ordered  - Monitor response to electrolyte replacements, including repeat lab results as appropriate  - Instruct patient on fluid and nutrition as appropriate  Outcome: Progressing  Goal: Fluid balance maintained  Description: INTERVENTIONS:  - Monitor labs   - Monitor I/O and WT  - Instruct patient on fluid and nutrition as appropriate  - Assess for signs & symptoms of volume excess or deficit  Outcome: Progressing     Problem: PAIN - ADULT  Goal: Verbalizes/displays adequate comfort level or baseline comfort level  Description: Interventions:  - Encourage patient to monitor pain and request assistance  - Assess pain using appropriate pain scale  - Administer analgesics based on type and severity of pain and evaluate response  - Implement non-pharmacological measures as appropriate and evaluate response  - Consider cultural and social influences on pain and pain management  - Notify physician/advanced practitioner if interventions unsuccessful or patient reports new pain  Outcome: Progressing     Problem: INFECTION - ADULT  Goal: Absence or prevention of progression during hospitalization  Description: INTERVENTIONS:  - Assess and monitor for signs and symptoms of infection  - Monitor lab/diagnostic results  - Monitor all insertion sites, i e  indwelling lines, tubes, and drains  - Monitor endotracheal if appropriate and nasal secretions for changes in amount and color  - [place] appropriate cooling/warming therapies per order  - Administer medications as ordered  - Instruct and encourage patient and family to use good hand hygiene technique  - Identify and instruct in appropriate isolation precautions for identified infection/condition  Outcome: Progressing     Problem: Knowledge Deficit  Goal: Patient/family/caregiver demonstrates understanding of disease process, treatment plan, medications, and discharge instructions  Description: Complete learning assessment and assess knowledge base    Interventions:  - Provide teaching at level of understanding  - Provide teaching via preferred learning methods  Outcome: Progressing

## 2023-06-07 NOTE — PLAN OF CARE
Problem: MOBILITY - ADULT  Goal: Maintain or return to baseline ADL function  Description: INTERVENTIONS:  -  Assess patient's ability to carry out ADLs; assess patient's baseline for ADL function and identify physical deficits which impact ability to perform ADLs (bathing, care of mouth/teeth, toileting, grooming, dressing, etc )  - Assess/evaluate cause of self-care deficits   - Assess range of motion  - Assess patient's mobility; develop plan if impaired  - Assess patient's need for assistive devices and provide as appropriate  - Encourage maximum independence but intervene and supervise when necessary  - Involve family in performance of ADLs  - Assess for home care needs following discharge   - Consider OT consult to assist with ADL evaluation and planning for discharge  - Provide patient education as appropriate  Outcome: Progressing  Goal: Maintains/Returns to pre admission functional level  Description: INTERVENTIONS:  - Perform BMAT or MOVE assessment daily    - Set and communicate daily mobility goal to care team and patient/family/caregiver  - Collaborate with rehabilitation services on mobility goals if consulted  - Perform Range of Motion 3-4 times a day  - Reposition patient every 2 hours    - Dangle patient 3 times a day  - Stand patient 3 times a day  - Ambulate patient 3 times a day  - Out of bed to chair 3 times a day   - Out of bed for meals 3 times a day  - Out of bed for toileting  - Record patient progress and toleration of activity level   Outcome: Progressing     Problem: GASTROINTESTINAL - ADULT  Goal: Minimal or absence of nausea and/or vomiting  Description: INTERVENTIONS:  - Administer IV fluids if ordered to ensure adequate hydration  - Maintain NPO status until nausea and vomiting are resolved  - Nasogastric tube if ordered  - Administer ordered antiemetic medications as needed  - Provide nonpharmacologic comfort measures as appropriate  - Advance diet as tolerated, if ordered  - Consider nutrition services referral to assist patient with adequate nutrition and appropriate food choices  Outcome: Progressing  Goal: Maintains or returns to baseline bowel function  Description: INTERVENTIONS:  - Assess bowel function  - Encourage oral fluids to ensure adequate hydration  - Administer IV fluids if ordered to ensure adequate hydration  - Administer ordered medications as needed  - Encourage mobilization and activity  - Consider nutritional services referral to assist patient with adequate nutrition and appropriate food choices  Outcome: Progressing  Goal: Maintains adequate nutritional intake  Description: INTERVENTIONS:  - Monitor percentage of each meal consumed  - Identify factors contributing to decreased intake, treat as appropriate  - Assist with meals as needed  - Monitor I&O, weight, and lab values if indicated  - Obtain nutrition services referral as needed  Outcome: Progressing     Problem: METABOLIC, FLUID AND ELECTROLYTES - ADULT  Goal: Electrolytes maintained within normal limits  Description: INTERVENTIONS:  - Monitor labs and assess patient for signs and symptoms of electrolyte imbalances  - Administer electrolyte replacement as ordered  - Monitor response to electrolyte replacements, including repeat lab results as appropriate  - Instruct patient on fluid and nutrition as appropriate  Outcome: Progressing  Goal: Fluid balance maintained  Description: INTERVENTIONS:  - Monitor labs   - Monitor I/O and WT  - Instruct patient on fluid and nutrition as appropriate  - Assess for signs & symptoms of volume excess or deficit  Outcome: Progressing     Problem: PAIN - ADULT  Goal: Verbalizes/displays adequate comfort level or baseline comfort level  Description: Interventions:  - Encourage patient to monitor pain and request assistance  - Assess pain using appropriate pain scale  - Administer analgesics based on type and severity of pain and evaluate response  - Implement non-pharmacological measures as appropriate and evaluate response  - Consider cultural and social influences on pain and pain management  - Notify physician/advanced practitioner if interventions unsuccessful or patient reports new pain  Outcome: Progressing     Problem: INFECTION - ADULT  Goal: Absence or prevention of progression during hospitalization  Description: INTERVENTIONS:  - Assess and monitor for signs and symptoms of infection  - Monitor lab/diagnostic results  - Monitor all insertion sites, i e  indwelling lines, tubes, and drains  - Monitor endotracheal if appropriate and nasal secretions for changes in amount and color  - [place] appropriate cooling/warming therapies per order  - Administer medications as ordered  - Instruct and encourage patient and family to use good hand hygiene technique  - Identify and instruct in appropriate isolation precautions for identified infection/condition  Outcome: Progressing     Problem: SAFETY ADULT  Goal: Maintain or return to baseline ADL function  Description: INTERVENTIONS:  -  Assess patient's ability to carry out ADLs; assess patient's baseline for ADL function and identify physical deficits which impact ability to perform ADLs (bathing, care of mouth/teeth, toileting, grooming, dressing, etc )  - Assess/evaluate cause of self-care deficits   - Assess range of motion  - Assess patient's mobility; develop plan if impaired  - Assess patient's need for assistive devices and provide as appropriate  - Encourage maximum independence but intervene and supervise when necessary  - Involve family in performance of ADLs  - Assess for home care needs following discharge   - Consider OT consult to assist with ADL evaluation and planning for discharge  - Provide patient education as appropriate  Outcome: Progressing  Goal: Maintains/Returns to pre admission functional level  Description: INTERVENTIONS:  - Perform BMAT or MOVE assessment daily    - Set and communicate daily mobility goal to care team and patient/family/caregiver  - Collaborate with rehabilitation services on mobility goals if consulted  - Perform Range of Motion 3-4 times a day  - Reposition patient every 2 hours    - Dangle patient 3 times a day  - Stand patient 3 times a day  - Ambulate patient 3 times a day  - Out of bed to chair 3 times a day   - Out of bed for meals 3 times a day  - Out of bed for toileting  - Record patient progress and toleration of activity level   Outcome: Progressing  Goal: Patient will remain free of falls  Description: INTERVENTIONS:  -  Assess patient's ability to carry out ADLs; assess patient's baseline for ADL function and identify physical deficits which impact ability to perform ADLs (bathing, care of mouth/teeth, toileting, grooming, dressing, etc )  - Assess/evaluate cause of self-care deficits   - Assess range of motion  - Assess patient's mobility; develop plan if impaired  - Assess patient's need for assistive devices and provide as appropriate  - Encourage maximum independence but intervene and supervise when necessary  - Involve family in performance of ADLs  - Assess for home care needs following discharge   - Consider OT consult to assist with ADL evaluation and planning for discharge  - Provide patient education as appropriate  Outcome: Progressing     Problem: DISCHARGE PLANNING  Goal: Discharge to home or other facility with appropriate resources  Description: INTERVENTIONS:  - Identify barriers to discharge w/patient and caregiver  - Arrange for needed discharge resources and transportation as appropriate  - Identify discharge learning needs (meds, wound care, etc )  - Arrange for interpretive services to assist at discharge as needed  - Refer to Case Management Department for coordinating discharge planning if the patient needs post-hospital services based on physician/advanced practitioner order or complex needs related to functional status, cognitive ability, or social support system  Outcome: Progressing     Problem: Knowledge Deficit  Goal: Patient/family/caregiver demonstrates understanding of disease process, treatment plan, medications, and discharge instructions  Description: Complete learning assessment and assess knowledge base  Interventions:  - Provide teaching at level of understanding  - Provide teaching via preferred learning methods  Outcome: Progressing     Problem: Nutrition/Hydration-ADULT  Goal: Nutrient/Hydration intake appropriate for improving, restoring or maintaining nutritional needs  Description: Monitor and assess patient's nutrition/hydration status for malnutrition  Collaborate with interdisciplinary team and initiate plan and interventions as ordered  Monitor patient's weight and dietary intake as ordered or per policy  Utilize nutrition screening tool and intervene as necessary  Determine patient's food preferences and provide high-protein, high-caloric foods as appropriate       INTERVENTIONS:  - Monitor oral intake, urinary output, labs, and treatment plans  - Assess nutrition and hydration status and recommend course of action  - Evaluate amount of meals eaten  - Assist patient with eating if necessary   - Allow adequate time for meals  - Recommend/ encourage appropriate diets, oral nutritional supplements, and vitamin/mineral supplements  - Order, calculate, and assess calorie counts as needed  - Recommend, monitor, and adjust tube feedings and TPN/PPN based on assessed needs  - Assess need for intravenous fluids  - Provide specific nutrition/hydration education as appropriate  - Include patient/family/caregiver in decisions related to nutrition  Outcome: Progressing

## 2023-06-07 NOTE — QUICK NOTE
The patient's chart was reviewed from the periphery  She has been made n p o  after midnight  Plan for cystoscopy by Dr Leeroy Mckee in the a prieto Rodriguez PA-C

## 2023-06-07 NOTE — PROGRESS NOTES
[address]  Progress Note  Name: Jamila Amaro  MRN: [id number]  Unit/Bed#: 452-60 I Date of Admission: 5/29/2023   Date of Service: 6/7/2023  Hospital Day: 9    Assessment/Plan   * Crohn's disease of colon with complication ([facility] )  Assessment & Plan  · Stable, no bloody diarrhea  · On Stelara and omalizumab infusion treatment as outpatient  · Patient missed all infusions this past week prior to admission  · She reports taking budesonide 3 mg 3 times daily at home  · GI consulted, appreciate  · She completed prior IV course for GI, now continuing for her MAST prep for cystoscopy   · She can resume PO steroids on discharge  · Continues to have some abdominal pain  · 6/7: c/o pain different than usual, reminds her of prior pancreatitis pain, will check lipase to r/o      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[place] [address] )  Assessment & Plan  · Follows with mast cell activation specialist at the [facility]  · Continue Benadryl 50 mg IM twice daily, Protonix 40 mg twice daily, pepcid twice daily  · Monitor symptoms  · She needs prep for her cystoscopy, which is being addressed by the above as well as IV steroids  · She will need dosed Benadryl and IV Steroids 1hr prior to procedure as well - ordered as ONCE PRN    Hematuria  Assessment & Plan  · Patient reports bright red blood in urine 6/3, then reported pink tinged and she is having some symptoms of frequency and straining with urination  · Patient was scheduled for outpatient procedure, she typically needs significant prep 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  · Her urologist was contacted, see chart, plan for while inpatient as she missed her appointment and given her needs for prep/monitoring  · Plan for cystoscopy 6/8  · NPO at midnight wed night    Seizure [facility])  Assessment & Plan  · Ativan as needed  · Not on any other seizure medications at home  · EEG completed  Patient had one of her typical events  No epileptic activity associated with the event  Consistent with psychogenic seizures, though seizures can be result of Mast cell disease  · No evidence further seizures  · Recommend outpatient therapy     Hypothyroidism (acquired)  Assessment & Plan  · TSH WNL 1 889  · Continue levothyroxine 100 mcg daily    SIRS (systemic inflammatory response syndrome) (HCC)  Assessment & Plan  SIRS, resolved, possibly due to Crohn's disease/Viral gastroenteritis; presented with WBC 13 25 and   · Resolved SIRS, see above            Vomiting and diarrhea  Assessment & Plan  Presented to ED with ongoing nonbloody nonbilious emesis and nonbloody diarrhea since EGD colonoscopy on 5/2/2023  · Stool C  difficile and PCR negative  · Met SIRS criteria on admission likely due to Crohn's vs viral gastroenteritis and resovled  · Symptoms improved/resolved                 VTE Pharmacologic Prophylaxis: VTE Score: 3 Moderate Risk (Score 3-4) - Pharmacological DVT Prophylaxis Contraindicated  Sequential Compression Devices Ordered  Patient Centered Rounds: I performed bedside rounds with nursing staff today  Total Time Spent on Date of Encounter in care of patient: 35 minutes This time was spent on one or more of the following: performing physical exam; counseling and coordination of care; obtaining or reviewing history; documenting in the medical record; reviewing/ordering tests, medications or procedures; communicating with other healthcare professionals and discussing with patient's family/caregivers  Current Length of Stay: 9 day(s)  Current Patient Status: Inpatient   Certification Statement: The patient will continue to require additional inpatient hospital stay due to cystoscpy tomorrow  Discharge Plan: Anticipate discharge in 24-48 hrs to home  Code Status: Level 1 - Full Code    Subjective:   Patient had abd pain earlier, states it did not feel like her Crohn's disease, and felt like pancreatitis which she had previously    Her other issues remain the same, she has some issues with urinating which remain unchanged  She had decreased appetite earlier as well, but did eat her lunch  Objective:     Vitals:   Temp (24hrs), Av 5 °F (36 9 °C), Min:98 °F (36 7 °C), Max:99 2 °F (37 3 °C)    Temp:  [98 °F (36 7 °C)-99 2 °F (37 3 °C)] 98 3 °F (36 8 °C)  HR:  [58-94] 58  Resp:  [16-18] 18  BP: (125-141)/(75-98) 138/78  SpO2:  [92 %-96 %] 93 %  Body mass index is 25 79 kg/m²  Input and Output Summary (last 24 hours): Intake/Output Summary (Last 24 hours) at 2023 1426  Last data filed at 2023 1101  Gross per 24 hour   Intake 1000 ml   Output --   Net 1000 ml       Physical Exam:   Physical Exam  Vitals and nursing note reviewed  Constitutional:       General: She is not in acute distress  Appearance: She is well-developed  HENT:      Head: Normocephalic and atraumatic  Eyes:      Conjunctiva/sclera: Conjunctivae normal    Cardiovascular:      Rate and Rhythm: Normal rate and regular rhythm  Heart sounds: No murmur heard  Pulmonary:      Effort: Pulmonary effort is normal  No respiratory distress  Breath sounds: Normal breath sounds  Abdominal:      Palpations: Abdomen is soft  Tenderness: There is no abdominal tenderness  Comments: Mild abdominal tenderness in the epigastric to RUQ region   Musculoskeletal:         General: No swelling  Cervical back: Neck supple  Skin:     General: Skin is warm and dry  Capillary Refill: Capillary refill takes less than 2 seconds  Neurological:      Mental Status: She is alert     Psychiatric:         Mood and Affect: Mood normal           Additional Data:     Labs:  Results from last 7 days   Lab Units 23  0454 23  0533 23  0617   EOS PCT %  --   --  0   HEMATOCRIT % 42 3   < > 40 3   HEMOGLOBIN g/dL 13 6   < > 13 3   LYMPHS PCT %  --   --  12*   MONOS PCT %  --   --  7   NEUTROS PCT %  --   --  79*   PLATELETS Thousands/uL 555*   < > 517*   WBC Thousand/uL 19 32* < > 14 39*    < > = values in this interval not displayed  Results from last 7 days   Lab Units 06/05/23  0533 06/04/23  0617   ANION GAP mmol/L 7 7   ALBUMIN g/dL  --  3 3*   ALK PHOS U/L  --  53   ALT U/L  --  38   AST U/L  --  16   BUN mg/dL 15 9   CALCIUM mg/dL 7 7* 7 6*   CHLORIDE mmol/L 102 104   CO2 mmol/L 29 29   CREATININE mg/dL 0 65 0 61   GLUCOSE RANDOM mg/dL 175* 131   POTASSIUM mmol/L 3 7 3 5   SODIUM mmol/L 138 140   TOTAL BILIRUBIN mg/dL  --  0 20                       Lines/Drains:  Invasive Devices     Peripheral Intravenous Line  Duration           Peripheral IV 06/06/23 Dorsal (posterior); Left Hand 1 day                      Imaging: No pertinent imaging reviewed      Recent Cultures (last 7 days):         Last 24 Hours Medication List:   Current Facility-Administered Medications   Medication Dose Route Frequency Provider Last Rate   • acetaminophen  650 mg Oral Q6H PRN Nicole Quintero, DO     • albuterol  2 puff Inhalation Q4H PRN Nicole Quintero DO     • butalbital-acetaminophen-caffeine  1 tablet Oral Q8H PRN Nicoel Quintero, DO     • cholecalciferol  1,000 Units Oral BID Nicole Quintero DO     • diphenhydrAMINE  50 mg Intramuscular Q6H Divine Miller MD     • diphenhydrAMINE  50 mg Intravenous Q6H PRN Duke Nguyen PA-C     • diphenhydrAMINE  50 mg Intravenous Once PRN Pro Dominguez, DO     • famotidine  20 mg Intravenous Q12H [address] Duke Nguyen PA-C     • HYDROmorphone  1 mg Intravenous Q4H PRN Duke Nguyen PA-C     • levothyroxine  100 mcg Oral Early Morning Betzy Grigsby, DO     • LORazepam  1 mg Intravenous Q4H PRN Aj Tripp DO     • methylPREDNISolone sodium succinate  20 mg Intravenous Q8H [address] Pro Chirag Counts, DO     • [START ON 6/8/2023] methylPREDNISolone sodium succinate  40 mg Intravenous Once PRN Pro Beard Counts, DO     • ondansetron  4 mg Intravenous Q6H PRN Divine Miller MD     • pantoprazole  40 mg Intravenous Q24H [address] [facility] Hiram, RANDEE     • potassium-sodium phosphateS  1 tablet Oral TID With Meals Lety Winter MD     • predniSONE  20 mg Oral Daily PRN Jefferson Brizuela DO     • sodium chloride  75 mL/hr Intravenous Continuous Marisa Silva PA-C 75 mL/hr (06/06/23 4378)        Today, Patient Was Seen By: Ara Dominguez DO    **Please Note: This note may have been constructed using a voice recognition system  **

## 2023-06-07 NOTE — ANESTHESIA PREPROCEDURE EVALUATION
Procedure:  CYSTOSCOPY (Bladder)    Relevant Problems   CARDIO   (+) Atrial tachycardia (HCC)   (+) CHF (congestive heart failure) (HCC)   (+) Migraine      ENDO   (+) Hypothyroidism (acquired)      /RENAL   (+) Nephrolithiasis      MUSCULOSKELETAL   (+) Chondromalacia   (+) Chronic back pain   (+) Fibromyalgia   (+) Primary localized osteoarthrosis of ankle and foot      NEURO/PSYCH   (+) Chronic back pain   (+) Fibromyalgia   (+) Generalized anxiety disorder   (+) Headache   (+) Migraine   (+) Seizure (HCC)      Other   (+) 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HCC)   Left Ventricle: Left ventricular cavity size is normal  Wall thickness is normal  The left ventricular ejection fraction is 60-65%  Systolic function is normal  Wall motion is normal   •  Right Ventricle: Right ventricular cavity size is normal  Systolic function is normal       Physical Exam    Airway    Mallampati score: II  TM Distance: >3 FB  Neck ROM: full     Dental       Cardiovascular      Pulmonary      Other Findings        Anesthesia Plan  ASA Score- 3     Anesthesia Type- general with ASA Monitors  Additional Monitors:   Airway Plan: LMA  Plan Factors-    Chart reviewed  Induction-     Postoperative Plan-     Informed Consent- Anesthetic plan and risks discussed with patient  I personally reviewed this patient with the CRNA  Discussed and agreed on the Anesthesia Plan with the CRNA  Rafaela Rouse

## 2023-06-08 ENCOUNTER — ANESTHESIA (INPATIENT)
Dept: PERIOP | Facility: HOSPITAL | Age: 48
End: 2023-06-08
Payer: COMMERCIAL

## 2023-06-08 DIAGNOSIS — E44.1 MILD PROTEIN-CALORIE MALNUTRITION (HCC): ICD-10-CM

## 2023-06-08 DIAGNOSIS — R63.0 ANOREXIA: Primary | ICD-10-CM

## 2023-06-08 DIAGNOSIS — R19.7 VOMITING AND DIARRHEA: ICD-10-CM

## 2023-06-08 DIAGNOSIS — R11.10 VOMITING AND DIARRHEA: ICD-10-CM

## 2023-06-08 PROBLEM — D49.4 BLADDER TUMOR: Status: ACTIVE | Noted: 2023-06-03

## 2023-06-08 PROBLEM — C67.9 TRANSITIONAL CELL CARCINOMA OF BLADDER (HCC): Status: ACTIVE | Noted: 2023-06-03

## 2023-06-08 PROBLEM — K50.10 CROHN'S COLITIS (HCC): Status: ACTIVE | Noted: 2020-10-17

## 2023-06-08 PROCEDURE — C9113 INJ PANTOPRAZOLE SODIUM, VIA: HCPCS | Performed by: UROLOGY

## 2023-06-08 PROCEDURE — 88305 TISSUE EXAM BY PATHOLOGIST: CPT | Performed by: STUDENT IN AN ORGANIZED HEALTH CARE EDUCATION/TRAINING PROGRAM

## 2023-06-08 PROCEDURE — 88342 IMHCHEM/IMCYTCHM 1ST ANTB: CPT | Performed by: STUDENT IN AN ORGANIZED HEALTH CARE EDUCATION/TRAINING PROGRAM

## 2023-06-08 PROCEDURE — 99233 SBSQ HOSP IP/OBS HIGH 50: CPT | Performed by: HOSPITALIST

## 2023-06-08 PROCEDURE — C1758 CATHETER, URETERAL: HCPCS | Performed by: UROLOGY

## 2023-06-08 PROCEDURE — C1769 GUIDE WIRE: HCPCS | Performed by: UROLOGY

## 2023-06-08 PROCEDURE — 88360 TUMOR IMMUNOHISTOCHEM/MANUAL: CPT | Performed by: STUDENT IN AN ORGANIZED HEALTH CARE EDUCATION/TRAINING PROGRAM

## 2023-06-08 PROCEDURE — 88341 IMHCHEM/IMCYTCHM EA ADD ANTB: CPT | Performed by: STUDENT IN AN ORGANIZED HEALTH CARE EDUCATION/TRAINING PROGRAM

## 2023-06-08 PROCEDURE — 0TBB8ZZ EXCISION OF BLADDER, VIA NATURAL OR ARTIFICIAL OPENING ENDOSCOPIC: ICD-10-PCS | Performed by: UROLOGY

## 2023-06-08 PROCEDURE — 52204 CYSTOSCOPY W/BIOPSY(S): CPT | Performed by: UROLOGY

## 2023-06-08 RX ORDER — PROPOFOL 10 MG/ML
INJECTION, EMULSION INTRAVENOUS CONTINUOUS PRN
Status: DISCONTINUED | OUTPATIENT
Start: 2023-06-08 | End: 2023-06-08

## 2023-06-08 RX ORDER — FENTANYL CITRATE/PF 50 MCG/ML
25 SYRINGE (ML) INJECTION
Status: DISCONTINUED | OUTPATIENT
Start: 2023-06-08 | End: 2023-06-08 | Stop reason: HOSPADM

## 2023-06-08 RX ORDER — METHYLPREDNISOLONE SODIUM SUCCINATE 125 MG/2ML
60 INJECTION, POWDER, LYOPHILIZED, FOR SOLUTION INTRAMUSCULAR; INTRAVENOUS ONCE
Status: CANCELLED
Start: 2023-06-15 | End: 2023-06-12

## 2023-06-08 RX ORDER — PROPOFOL 10 MG/ML
INJECTION, EMULSION INTRAVENOUS AS NEEDED
Status: DISCONTINUED | OUTPATIENT
Start: 2023-06-08 | End: 2023-06-08

## 2023-06-08 RX ORDER — FENTANYL CITRATE 50 UG/ML
INJECTION, SOLUTION INTRAMUSCULAR; INTRAVENOUS AS NEEDED
Status: DISCONTINUED | OUTPATIENT
Start: 2023-06-08 | End: 2023-06-08

## 2023-06-08 RX ORDER — ONDANSETRON 2 MG/ML
INJECTION INTRAMUSCULAR; INTRAVENOUS AS NEEDED
Status: DISCONTINUED | OUTPATIENT
Start: 2023-06-08 | End: 2023-06-08

## 2023-06-08 RX ORDER — SODIUM CHLORIDE 9 MG/ML
20 INJECTION, SOLUTION INTRAVENOUS CONTINUOUS
Status: DISCONTINUED | OUTPATIENT
Start: 2023-06-08 | End: 2023-06-08

## 2023-06-08 RX ORDER — SODIUM CHLORIDE 9 MG/ML
INJECTION, SOLUTION INTRAVENOUS CONTINUOUS PRN
Status: DISCONTINUED | OUTPATIENT
Start: 2023-06-08 | End: 2023-06-08

## 2023-06-08 RX ORDER — FENTANYL CITRATE/PF 50 MCG/ML
50 SYRINGE (ML) INJECTION
Status: DISCONTINUED | OUTPATIENT
Start: 2023-06-08 | End: 2023-06-08 | Stop reason: HOSPADM

## 2023-06-08 RX ORDER — ONDANSETRON 2 MG/ML
4 INJECTION INTRAMUSCULAR; INTRAVENOUS ONCE AS NEEDED
Status: DISCONTINUED | OUTPATIENT
Start: 2023-06-08 | End: 2023-06-08 | Stop reason: HOSPADM

## 2023-06-08 RX ORDER — SORBITOL 30 G/1000ML
IRRIGANT IRRIGATION AS NEEDED
Status: DISCONTINUED | OUTPATIENT
Start: 2023-06-08 | End: 2023-06-08 | Stop reason: HOSPADM

## 2023-06-08 RX ORDER — METOCLOPRAMIDE HYDROCHLORIDE 5 MG/ML
10 INJECTION INTRAMUSCULAR; INTRAVENOUS ONCE AS NEEDED
Status: DISCONTINUED | OUTPATIENT
Start: 2023-06-08 | End: 2023-06-08 | Stop reason: HOSPADM

## 2023-06-08 RX ORDER — PANTOPRAZOLE SODIUM 40 MG/10ML
40 INJECTION, POWDER, LYOPHILIZED, FOR SOLUTION INTRAVENOUS ONCE
Status: CANCELLED
Start: 2023-06-15 | End: 2023-06-12

## 2023-06-08 RX ORDER — MAGNESIUM HYDROXIDE 1200 MG/15ML
LIQUID ORAL AS NEEDED
Status: DISCONTINUED | OUTPATIENT
Start: 2023-06-08 | End: 2023-06-08 | Stop reason: HOSPADM

## 2023-06-08 RX ORDER — DIPHENHYDRAMINE HYDROCHLORIDE 50 MG/ML
INJECTION INTRAMUSCULAR; INTRAVENOUS AS NEEDED
Status: DISCONTINUED | OUTPATIENT
Start: 2023-06-08 | End: 2023-06-08

## 2023-06-08 RX ORDER — METHYLPREDNISOLONE SODIUM SUCCINATE 125 MG/2ML
INJECTION, POWDER, LYOPHILIZED, FOR SOLUTION INTRAMUSCULAR; INTRAVENOUS AS NEEDED
Status: DISCONTINUED | OUTPATIENT
Start: 2023-06-08 | End: 2023-06-08

## 2023-06-08 RX ORDER — MIDAZOLAM HYDROCHLORIDE 2 MG/2ML
INJECTION, SOLUTION INTRAMUSCULAR; INTRAVENOUS AS NEEDED
Status: DISCONTINUED | OUTPATIENT
Start: 2023-06-08 | End: 2023-06-08

## 2023-06-08 RX ADMIN — PROPOFOL 120 MCG/KG/MIN: 10 INJECTION, EMULSION INTRAVENOUS at 07:49

## 2023-06-08 RX ADMIN — DIPHENHYDRAMINE HYDROCHLORIDE 50 MG: 50 INJECTION, SOLUTION INTRAMUSCULAR; INTRAVENOUS at 18:40

## 2023-06-08 RX ADMIN — PROPOFOL 50 MG: 10 INJECTION, EMULSION INTRAVENOUS at 07:49

## 2023-06-08 RX ADMIN — ONDANSETRON 4 MG: 2 INJECTION INTRAMUSCULAR; INTRAVENOUS at 12:54

## 2023-06-08 RX ADMIN — HYDROMORPHONE HYDROCHLORIDE 1 MG: 1 INJECTION, SOLUTION INTRAMUSCULAR; INTRAVENOUS; SUBCUTANEOUS at 20:51

## 2023-06-08 RX ADMIN — ONDANSETRON 4 MG: 2 INJECTION INTRAMUSCULAR; INTRAVENOUS at 21:11

## 2023-06-08 RX ADMIN — LORAZEPAM 1 MG: 2 INJECTION INTRAMUSCULAR; INTRAVENOUS at 12:47

## 2023-06-08 RX ADMIN — HYDROMORPHONE HYDROCHLORIDE 1 MG: 1 INJECTION, SOLUTION INTRAMUSCULAR; INTRAVENOUS; SUBCUTANEOUS at 05:18

## 2023-06-08 RX ADMIN — DIPHENHYDRAMINE HYDROCHLORIDE 50 MG: 50 INJECTION, SOLUTION INTRAMUSCULAR; INTRAVENOUS at 19:23

## 2023-06-08 RX ADMIN — HYDROMORPHONE HYDROCHLORIDE 1 MG: 1 INJECTION, SOLUTION INTRAMUSCULAR; INTRAVENOUS; SUBCUTANEOUS at 16:13

## 2023-06-08 RX ADMIN — HYDROMORPHONE HYDROCHLORIDE 1 MG: 1 INJECTION, SOLUTION INTRAMUSCULAR; INTRAVENOUS; SUBCUTANEOUS at 09:37

## 2023-06-08 RX ADMIN — LORAZEPAM 1 MG: 2 INJECTION INTRAMUSCULAR; INTRAVENOUS at 23:34

## 2023-06-08 RX ADMIN — FENTANYL CITRATE 50 MCG: 50 INJECTION, SOLUTION INTRAMUSCULAR; INTRAVENOUS at 07:52

## 2023-06-08 RX ADMIN — METHYLPREDNISOLONE SODIUM SUCCINATE 20 MG: 40 INJECTION, POWDER, FOR SOLUTION INTRAMUSCULAR; INTRAVENOUS at 05:19

## 2023-06-08 RX ADMIN — ONDANSETRON HYDROCHLORIDE 4 MG: 2 INJECTION, SOLUTION INTRAVENOUS at 07:52

## 2023-06-08 RX ADMIN — LORAZEPAM 1 MG: 2 INJECTION INTRAMUSCULAR; INTRAVENOUS at 18:39

## 2023-06-08 RX ADMIN — SODIUM CHLORIDE: 0.9 INJECTION, SOLUTION INTRAVENOUS at 07:44

## 2023-06-08 RX ADMIN — LORAZEPAM 1 MG: 2 INJECTION INTRAMUSCULAR; INTRAVENOUS at 06:06

## 2023-06-08 RX ADMIN — METHYLPREDNISOLONE SODIUM SUCCINATE 20 MG: 40 INJECTION, POWDER, FOR SOLUTION INTRAMUSCULAR; INTRAVENOUS at 13:01

## 2023-06-08 RX ADMIN — SODIUM CHLORIDE 75 ML/HR: 0.9 INJECTION, SOLUTION INTRAVENOUS at 12:55

## 2023-06-08 RX ADMIN — METHYLPREDNISOLONE SODIUM SUCCINATE 125 MG: 125 INJECTION, POWDER, FOR SOLUTION INTRAMUSCULAR; INTRAVENOUS at 07:46

## 2023-06-08 RX ADMIN — DIPHENHYDRAMINE HYDROCHLORIDE 50 MG: 50 INJECTION, SOLUTION INTRAMUSCULAR; INTRAVENOUS at 12:47

## 2023-06-08 RX ADMIN — DIPHENHYDRAMINE HYDROCHLORIDE 50 MG: 50 INJECTION, SOLUTION INTRAMUSCULAR; INTRAVENOUS at 07:46

## 2023-06-08 RX ADMIN — FAMOTIDINE 20 MG: 10 INJECTION, SOLUTION INTRAVENOUS at 21:04

## 2023-06-08 RX ADMIN — DIPHENHYDRAMINE HYDROCHLORIDE 50 MG: 50 INJECTION, SOLUTION INTRAMUSCULAR; INTRAVENOUS at 23:23

## 2023-06-08 RX ADMIN — DIPHENHYDRAMINE HYDROCHLORIDE 50 MG: 50 INJECTION, SOLUTION INTRAMUSCULAR; INTRAVENOUS at 05:18

## 2023-06-08 RX ADMIN — MIDAZOLAM HYDROCHLORIDE 2 MG: 1 INJECTION, SOLUTION INTRAMUSCULAR; INTRAVENOUS at 07:44

## 2023-06-08 RX ADMIN — METHYLPREDNISOLONE SODIUM SUCCINATE 20 MG: 40 INJECTION, POWDER, FOR SOLUTION INTRAMUSCULAR; INTRAVENOUS at 21:04

## 2023-06-08 RX ADMIN — FENTANYL CITRATE 50 MCG: 50 INJECTION, SOLUTION INTRAMUSCULAR; INTRAVENOUS at 08:04

## 2023-06-08 RX ADMIN — PANTOPRAZOLE SODIUM 40 MG: 40 INJECTION, POWDER, FOR SOLUTION INTRAVENOUS at 09:30

## 2023-06-08 RX ADMIN — LORAZEPAM 1 MG: 2 INJECTION INTRAMUSCULAR; INTRAVENOUS at 00:01

## 2023-06-08 RX ADMIN — FAMOTIDINE 20 MG: 10 INJECTION, SOLUTION INTRAVENOUS at 09:30

## 2023-06-08 NOTE — PROGRESS NOTES
[address] [facility]  Progress Note  Name: Wes Graves  MRN: [id number]  Unit/Bed#: 547-92 I Date of Admission: 5/29/2023   Date of Service: 6/8/2023 I Hospital Day: 10    Assessment/Plan   * Crohn's disease of colon with complication ([facility] 75 )  Assessment & Plan  · Stable, no bloody diarrhea  · On Stelara and omalizumab infusion treatment as outpatient  · Patient missed all infusions past week prior to admission  · She reports taking budesonide 3 mg 3 times daily at home    · GI consulted, appreciate  · She completed prior IV course for GI, now continuing for her MAST prep/post-procedure  · She can resume PO steroids on discharge - plan to resume PO 6/9  · Continues to have some abdominal pain  · 6/7: c/o pain different than usual, reminds her of prior pancreatitis pain, lipase wnl      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[facility] 75 )  Assessment & Plan  · Follows with mast cell activation specialist at the [facility]  · Continue Benadryl 50 mg IM twice daily, Protonix 40 mg twice daily, pepcid twice daily  · Monitor symptoms  · She needs prep for her cystoscopy, which is being addressed by the above as well as IV steroids  · She will continue 24 hours post-procedure on intravenous meds  · Plan for her  to bring in home medications and start PO regimen tomorrow  · Continue IVF    Bladder tumor  Assessment & Plan  · Patient had been having prior urinary issues before admission, planned for outpatient cystoscopy  · Patient reported bright red blood in urine 6/3, then reported pink tinged and she is having some symptoms of frequency and straining with urination  · Patient was scheduled for outpatient procedure, for which she typically needs significant prep 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  · Her urologist was contacted, see chart, plan for while inpatient as she missed her appointment and given her needs for prep/monitoring  · S/p cystoscopy 6/8, which found likely bladder tumor consistent with TCC, underwent fulguration and biopsy  · Urology to follow biopsy and further management    Seizure [facility])  Assessment & Plan  · Ativan as needed  · Not on any other seizure medications at home  · EEG completed  Patient had one of her typical events  No epileptic activity associated with the event  Consistent with psychogenic seizures, though seizures can be result of Mast cell disease  · No evidence further seizures   · Recommend outpatient therapy    Hypothyroidism (acquired)  Assessment & Plan  · TSH WNL 1 889  · Continue levothyroxine 100 mcg daily    SIRS (systemic inflammatory response syndrome) (HCC)  Assessment & Plan  SIRS, resolved, possibly due to Crohn's disease/Viral gastroenteritis; presented with WBC 13 25 and   · Resolved SIRS, see above            Vomiting and diarrhea  Assessment & Plan  Presented to ED with ongoing nonbloody nonbilious emesis and nonbloody diarrhea since EGD colonoscopy on 5/2/2023  · Stool C  difficile and PCR negative  · Met SIRS criteria on admission likely due to Crohn's vs viral gastroenteritis and resovled  · Symptoms improved/resolved             VTE Pharmacologic Prophylaxis: VTE Score: 3 Moderate Risk (Score 3-4) - Pharmacological DVT Prophylaxis Contraindicated  Sequential Compression Devices Ordered  Patient Centered Rounds: I performed bedside rounds with nursing staff today  Education and Discussions with Family / Patient: Patient declined call to   Total Time Spent on Date of Encounter in care of patient: 45 minutes This time was spent on one or more of the following: performing physical exam; counseling and coordination of care; obtaining or reviewing history; documenting in the medical record; reviewing/ordering tests, medications or procedures; communicating with other healthcare professionals and discussing with patient's family/caregivers      Current Length of Stay: 10 day(s)  Current Patient Status: Inpatient   Certification Statement: The patient will continue to require additional inpatient hospital stay due to post-procedure MAST cell management/monitoring give high risk of adverse events due to underlying illness  Discharge Plan: Anticipate discharge in 48 hrs to home  Code Status: Level 1 - Full Code    Subjective:   Patient continues to have some abdominal pain, and some discomfort suspected due to cystoscopy this morning however overall feeling relief now s/p procedure and knowledge/diagnosis from procedure  Objective:     Vitals:   Temp (24hrs), Av 6 °F (37 °C), Min:98 2 °F (36 8 °C), Max:99 3 °F (37 4 °C)    Temp:  [98 2 °F (36 8 °C)-99 3 °F (37 4 °C)] 98 6 °F (37 °C)  HR:  [72-92] 72  Resp:  [16-21] 20  BP: (120-132)/(77-90) 132/90  SpO2:  [92 %-99 %] 94 %  Body mass index is 25 63 kg/m²  Input and Output Summary (last 24 hours): Intake/Output Summary (Last 24 hours) at 2023 1221  Last data filed at 2023 0815  Gross per 24 hour   Intake 644 26 ml   Output --   Net 644 26 ml       Physical Exam:   Physical Exam  Vitals and nursing note reviewed  Constitutional:       General: She is not in acute distress  Appearance: She is well-developed  HENT:      Head: Normocephalic and atraumatic  Eyes:      Conjunctiva/sclera: Conjunctivae normal    Cardiovascular:      Rate and Rhythm: Normal rate and regular rhythm  Heart sounds: No murmur heard  Pulmonary:      Effort: Pulmonary effort is normal  No respiratory distress  Breath sounds: Normal breath sounds  Abdominal:      Palpations: Abdomen is soft  Tenderness: There is no abdominal tenderness  Comments: Mild abdominal tenderness in the lower quadrant/suprapubic area   Musculoskeletal:         General: No swelling  Cervical back: Neck supple  Skin:     General: Skin is warm and dry  Capillary Refill: Capillary refill takes less than 2 seconds  Neurological:      Mental Status: She is alert     Psychiatric:         Mood and Affect: Mood normal           Additional Data:     Labs:  Results from last 7 days   Lab Units 06/06/23  0454 06/05/23  0533 06/04/23  0617   EOS PCT %  --   --  0   HEMATOCRIT % 42 3   < > 40 3   HEMOGLOBIN g/dL 13 6   < > 13 3   LYMPHS PCT %  --   --  12*   MONOS PCT %  --   --  7   NEUTROS PCT %  --   --  79*   PLATELETS Thousands/uL 555*   < > 517*   WBC Thousand/uL 19 32*   < > 14 39*    < > = values in this interval not displayed  Results from last 7 days   Lab Units 06/05/23  0533 06/04/23  0617   ANION GAP mmol/L 7 7   ALBUMIN g/dL  --  3 3*   ALK PHOS U/L  --  53   ALT U/L  --  38   AST U/L  --  16   BUN mg/dL 15 9   CALCIUM mg/dL 7 7* 7 6*   CHLORIDE mmol/L 102 104   CO2 mmol/L 29 29   CREATININE mg/dL 0 65 0 61   GLUCOSE RANDOM mg/dL 175* 131   POTASSIUM mmol/L 3 7 3 5   SODIUM mmol/L 138 140   TOTAL BILIRUBIN mg/dL  --  0 20                       Lines/Drains:  Invasive Devices     Peripheral Intravenous Line  Duration           Peripheral IV 06/06/23 Dorsal (posterior); Left Hand 2 days                      Imaging: No pertinent imaging reviewed      Recent Cultures (last 7 days):         Last 24 Hours Medication List:   Current Facility-Administered Medications   Medication Dose Route Frequency Provider Last Rate   • acetaminophen  650 mg Oral Q6H PRN Han Ennis MD     • albuterol  2 puff Inhalation Q4H PRN Han Ennis MD     • butalbital-acetaminophen-caffeine  1 tablet Oral Q8H PRN Han Ennis MD     • cholecalciferol  1,000 Units Oral BID Han Ennis MD     • diphenhydrAMINE  50 mg Intramuscular Aki Gudino MD     • diphenhydrAMINE  50 mg Intravenous Q6H PRN Han Ennis MD     • diphenhydrAMINE  50 mg Intravenous Once PRN Han Ennis MD     • famotidine  20 mg Intravenous Q12H [facility] Han Ennis MD     • HYDROmorphone  1 mg Intravenous Q4H PRN Han Ennis MD     • levothyroxine  100 mcg Oral Early Morning Han Ennis MD     • LORazepam  1 mg Intravenous Q4H PRN Han Ennis MD     • methylPREDNISolone sodium succinate  20 mg Intravenous [facility] Netta Alvarez MD     • methylPREDNISolone sodium succinate  40 mg Intravenous Once PRN Netta Alvarez MD     • ondansetron  4 mg Intravenous Q6H PRN Netta Alvarez MD     • pantoprazole  40 mg Intravenous Q24H [facility] Netta Alvarez MD     • potassium-sodium phosphateS  1 tablet Oral TID With Meals Netta Alvarez MD     • predniSONE  20 mg Oral Daily PRN Netta Alvarez MD     • sodium chloride  75 mL/hr Intravenous Continuous Netta Alvarez MD 75 mL/hr (06/08/23 7028)        Today, Patient Was Seen By: Verla Felty Counts, DO    **Please Note: This note may have been constructed using a voice recognition system  **

## 2023-06-08 NOTE — INTERVAL H&P NOTE
H&P reviewed  After examining the patient I find no changes in the patients condition since the H&P had been written  Plan for cystoscopy under anesthesia in the OR  Procedure, risks of bleeding and infection explained , gives informed consent  This is for gross hematuria  Pt presently admitted for hydration after colonoscopy  Has Mast Cell Syndrome, needs prep so am doing this while inpatient      Vitals:    06/08/23 0605   BP: 130/87   Pulse: 92   Resp: 18   Temp: 98 2 °F (36 8 °C)   SpO2: 96%

## 2023-06-08 NOTE — CASE MANAGEMENT
Case Management Discharge Planning Note    Patient name Elizabeth Epps  Location /974-31 MRN [id number]  : 1975 Date 2023       Current Admission Date: 2023  Current Admission Diagnosis:Crohn's colitis [facility])   Patient Active Problem List    Diagnosis Date Noted   • Hematuria    • Bladder tumor 2023   • Seizure ([facility] )    • Immunosuppression due to chronic steroid use ([facility] ) 2022   • Atrial tachycardia ([facility] ) 2022   • Nephrolithiasis 2021   • Anaphylactic reaction 2021   • Intractable nausea and vomiting 2021   • Heart palpitations 2021   • Inflammatory bowel disease 2021   • Current chronic use of systemic steroids 2021   • MS (multiple sclerosis) ([facility] ) 2021   • Migraine    • Overweight (BMI 25 0-29 9) 2021   • Anorexia 10/17/2020   • Crohn's colitis ([facility] ) 10/17/2020   • Mild protein-calorie malnutrition ([facility] ) 2019   • Constipation 09/10/2019   • Mass of left axilla 2019   • Immunosuppressed status ([facility] ) 2019   • Thrombocytosis 2019   • Proctitis 2019   • Thrush 2019   • Chronic back pain 2019   • Primary localized osteoarthrosis of ankle and foot 2018   • Fibromyalgia 2017   • CHF (congestive heart failure) ([facility] ) 2017   • Meniere's disease 2017   • Leukocytosis 09/10/2017   • Hypomagnesemia 2017   • Generalized anxiety disorder 2017   • Vomiting and diarrhea 2017   • SIRS (systemic inflammatory response syndrome) ([facility] ) 2017   • Vitamin D deficiency 2017   • Hypokalemia 2017   • Throat tightness 2017   • Abdominal pain 2017   • Headache 2017   • Disorder of synovium 2017   • Chondromalacia 2017   • Chronic idiopathic urticaria 2017   • 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[facility] ) 2016   • Hypothyroidism (acquired) 2014      LOS (days): 10  Geometric Mean LOS (GMLOS) (days): 3 40  Days to GMLOS:-6 8     OBJECTIVE:  Risk of Unplanned Readmission Score: 10 71         Current admission status: Inpatient   Preferred Pharmacy:   [address] [facility] [address]  [address] [facility]  [address]  [place] [address] [place] [address]  Phone: [phone] Fax: [phone]    [address], [address] [facility]  [address] [address]  Phone: [phone] Fax: [phone]    [facility]) [address] [facility], [address] [place]  [address]  Phone: [phone] Fax: [phone]    Primary Care Provider: Klaudia Cosby DO    Primary Insurance: [facility]  Secondary Insurance: MEDICARE    DISCHARGE DETAILS:  Discussed pt in interdisciplinary team meeting  Pt had a cystoscopy today with removal of small bladder tumor  Pt is a possible dc home on Saturday 6/10

## 2023-06-08 NOTE — UTILIZATION REVIEW
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 just behind and lateral to the right ureteral orifice  No involvement of the right ureteral orifice  Grade 3 cystocele  Grade 3 rectocele  Assessment/Plan: Reports continued abdominal pain, attributing some to cystoscopy this morning, but overall notes feeling relief after procedure  On exam, LLQ and suprapubic tenderness  Plan: resume steroids tomorrow, 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; PPI BID, Pepcid BID, IVF, continue other meds as able  The patient will continue to require additional inpatient hospital stay due to post-procedure MAST cell management/monitoring give high risk of adverse events due to underlying illness      Vital Signs:   Date/Time Temp Pulse Resp BP MAP (mmHg) SpO2 O2 Device   06/08/23 11:43:03 98 6 °F (37 °C) 72 -- 132/90 104 94 % --   06/08/23 0825 98 5 °F (36 9 °C) 83 20 124/84 100 99 % None (Room air)   06/08/23 0820 -- 77 21 120/80 96 95 % --   06/08/23 0812 99 3 °F (37 4 °C) 81 16 122/77 -- 93 % None (Room air)   06/08/23 06:05:59 98 2 °F (36 8 °C) 92 18 130/87 101 96 % --   06/07/23 21:29:06 98 5 °F (36 9 °C) 75 18 128/85 99 95 % None (Room air)   06/07/23 2115 -- -- -- -- -- 92 % None (Room air)   06/07/23 14:35:08 98 6 °F (37 °C) 76 18 130/87 101 96 % None (Room air)       Pertinent Labs/Diagnostic Results:   Results from last 7 days   Lab Units 06/06/23  0454 06/05/23  0533 06/04/23  0617 06/03/23  0451 06/02/23  0418   HEMATOCRIT % 42 3 37 6 40 3 39 6 37 9   HEMOGLOBIN g/dL 13 6 12 4 13 3 13 2 12 5   NEUTROS ABS Thousands/µL  --   --  11 27* 12 48* 10 86*   PLATELETS Thousands/uL 555* 497* 517* 502* 488*   WBC Thousand/uL 19 32* 18 91* 14 39* 16 06* 13 47*         Results from last 7 days   Lab Units 06/05/23  0533 06/04/23  0617 06/03/23  0451 06/02/23  0418   ANION GAP mmol/L 7 7 8 7   BUN mg/dL 15 9 13 10   CALCIUM mg/dL 7 7* 7 6* 8 6 8 3*   CHLORIDE mmol/L 102 104 103 104   CO2 mmol/L 29 29 28 28   CREATININE mg/dL 0 65 0 61 0 61 0 57*   EGFR ml/min/1 73sq m 103 106 106 108   POTASSIUM mmol/L 3 7 3 5 3 6 3 4*   MAGNESIUM mg/dL 1 9 2 0 1 7*  --    SODIUM mmol/L 138 140 139 139     Results from last 7 days   Lab Units 06/04/23  0617 06/03/23  0451 06/02/23  0418   ALBUMIN g/dL 3 3* 3 5 3 4*   ALK PHOS U/L 53 61 64   ALT U/L 38 40 19   AST U/L 16 25 18   TOTAL BILIRUBIN mg/dL 0 20 0 23 0 22   TOTAL PROTEIN g/dL 5 6* 6 1* 5 9*         Results from last 7 days   Lab Units 06/05/23  0533 06/04/23  0617 06/03/23  0451 06/02/23  0418   GLUCOSE RANDOM mg/dL 175* 131 127 125       Results from last 7 days   Lab Units 06/02/23  0418   IRON SATURATION % 23   FERRITIN ng/mL 37   IRON ug/dL 53   TIBC ug/dL 227*       Results from last 7 days   Lab Units 06/07/23  1656   LIPASE u/L 27                 Results from last 7 days   Lab Units 06/05/23  1216 06/01/23  1529   BACTERIA UA /hpf None Seen  --    BILIRUBIN UA  Negative Negative   BLOOD UA  Negative Negative   CLARITY UA  Clear Clear   COLOR UA  Light Yellow Yellow   EPITHELIAL CELLS WET PREP /hpf None Seen  --    GLUCOSE UA mg/dl Negative 100 (1/10%)*   KETONES UA mg/dl Negative Negative   LEUKOCYTES UA  Negative Negative   NITRITE UA  Negative Negative   PH UA  7 5 6 5   PROTEIN UA mg/dl Negative Negative   RBC UA /hpf None Seen  --    SPEC GRAV UA  1 010 1 015   UROBILINOGEN UA E U /dl 0 2 0 2   WBC UA /hpf None Seen  --             Medications:   Scheduled Medications:  cholecalciferol, 1,000 Units, Oral, BID  diphenhydrAMINE, 50 mg, Intramuscular, Q6H  famotidine, 20 mg, Intravenous, Q12H [facility] custod[facility]  levothyroxine, 100 mcg, Oral, Early Morning  methylPREDNISolone sodium succinate, 20 mg, Intravenous, Q8H [facility]  pantoprazole, 40 mg, Intravenous, Q24H [facility] custod[facility]  potassium-sodium phosphateS, 1 tablet, Oral, TID With Meals    Continuous IV Infusions:  sodium chloride, 75 mL/hr, Intravenous, Continuous    PRN Meds:  acetaminophen, 650 mg, Oral, Q6H PRN  albuterol, 2 puff, Inhalation, Q4H PRN  butalbital-acetaminophen-caffeine, 1 tablet, Oral, Q8H PRN  diphenhydrAMINE, 50 mg, Intravenous, Q6H PRN  diphenhydrAMINE, 50 mg, Intravenous, Once PRN  HYDROmorphone, 1 mg, Intravenous, Q4H PRN; 6/8 x2  LORazepam, 1 mg, Intravenous, Q4H PRN; 6/8 x3  methylPREDNISolone sodium succinate, 40 mg, Intravenous, Once PRN  ondansetron, 4 mg, Intravenous, Q6H PRN; 6/8 x1  predniSONE, 20 mg, Oral, Daily PRN        Discharge Plan: D    Network Utilization Review Department  ATTENTION: Please call with any questions or concerns to [phone] and carefully listen to the prompts so that you are directed to the right person  All voicemails are confidential   Fatemeh Cesar all requests for admission clinical reviews, approved or denied determinations and any other requests to dedicated fax number below belonging to the campus where the patient is receiving treatment   List of dedicated fax numbers for the Facilities:  [address] [place] [address] DENIALS (Administrative/Medical Necessity) [phone]   [address] [place] [address] (Maternity/NICU/Pediatrics) [phone]   [address] [phone]   [facility] [phone]   [facility] [phone]   [id number] [place] [address] N[facility] [phone]   [facility] [address] [facility] [address] [phone]

## 2023-06-08 NOTE — OP NOTE
OPERATIVE REPORT  PATIENT NAME: Eleanor Hernandez    :  1975  MRN: [id number]  Pt Location: [facility] OR ROOM 02    SURGERY DATE: 2023    Surgeon(s) and Role:     Orin Ware MD - Primary    Preop Diagnosis:  Hematuria, unspecified type [R31 9]    Post-Op Diagnosis Codes:     * Hematuria, unspecified type [R31 9] bladder tumor right lateral trigone    Procedure(s):  CYSTOSCOPY  mini TURBT with fulguration of 0 5 cm superficial bladder tumor right lateral trigone    Specimen(s):  ID Type Source Tests Collected by Time Destination   1 : right lateral trigone Tissue Urinary Bladder TISSUE EXAM Han Ennis MD 2023 0801        Estimated Blood Loss:   Minimal    Drains:  * No LDAs found *    Anesthesia Type:   Choice    Operative Indications:  Hematuria, unspecified type [R31 9]      Operative Findings:    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 just behind and lateral to the right ureteral orifice  No involvement of the right ureteral orifice  Grade 3 cystocele  Grade 3 rectocele  Complications:   None    Procedure and Technique:  23-year-old woman with intermittent gross hematuria hospitalized for other reasons, set up for inpatient cystoscopy due to not being able to tolerated in the office and need for mast cell preparation so she was hospitalized we decided do this while inpatient  She needs cystoscopy for evaluation of gross hematuria  The procedure of cystoscopy was described along with risks of bleeding infection damage urinary tract and need for additional procedures  She also gave consent for me to do anything else I needed to do incidental to the procedure  The patient was brought to the operating identified properly  IV sedation was induced the patient was prepped and draped in dorsolithotomy position usual fashion  A timeout was performed  The patient has multiple allergies  No antibiotics were given for the procedure because the risks outweigh the benefits    The patient was noted to have a grade 3 cystocele as well as a rectocele  The urethra was otherwise normal   It was normal without stricture and the bladder itself was smooth not trabeculated and consistent with cystocele and there was a small papillary tumor consistent with TCC behind and lateral to the right ureteral orifice  This basically amounted to 2 small papillary mounds  Appear to be superficial   Using the cup forceps, I resected this and sent this to pathology  I then took the Bugbee electrode and fulgurated the base and the orifice was spared  The bladder was drained  I was present for the entire procedure  and A qualified resident physician was not available      Patient Disposition:  PACU  and hemodynamically stable        SIGNATURE: Finn Torres MD  DATE: June 8, 2023  TIME: 8:08 AM

## 2023-06-08 NOTE — ANESTHESIA POSTPROCEDURE EVALUATION
Post-Op Assessment Note    CV Status:  Stable  Pain Score: 0    Pain management: adequate     Mental Status:  Alert and awake   Hydration Status:  Euvolemic   PONV Controlled:  Controlled   Airway Patency:  Patent      Post Op Vitals Reviewed: Yes      Staff: CRNA         No notable events documented  BP   122/56   Temp   98   Pulse  70   Resp   12   SpO2   98   FM and guaze off and on RA with vss

## 2023-06-08 NOTE — PLAN OF CARE
Problem: MOBILITY - ADULT  Goal: Maintain or return to baseline ADL function  Description: INTERVENTIONS:  -  Assess patient's ability to carry out ADLs; assess patient's baseline for ADL function and identify physical deficits which impact ability to perform ADLs (bathing, care of mouth/teeth, toileting, grooming, dressing, etc )  - Assess/evaluate cause of self-care deficits   - Assess range of motion  - Assess patient's mobility; develop plan if impaired  - Assess patient's need for assistive devices and provide as appropriate  - Encourage maximum independence but intervene and supervise when necessary  - Involve family in performance of ADLs  - Assess for home care needs following discharge   - Consider OT consult to assist with ADL evaluation and planning for discharge  - Provide patient education as appropriate  Outcome: Progressing  Goal: Maintains/Returns to pre admission functional level  Description: INTERVENTIONS:  - Perform BMAT or MOVE assessment daily    - Set and communicate daily mobility goal to care team and patient/family/caregiver  - Collaborate with rehabilitation services on mobility goals if consulted  - Perform Range of Motion 3-4 times a day  - Reposition patient every 2 hours    - Dangle patient 3 times a day  - Stand patient 3 times a day  - Ambulate patient 3 times a day  - Out of bed to chair 3 times a day   - Out of bed for meals 3 times a day  - Out of bed for toileting  - Record patient progress and toleration of activity level   Outcome: Progressing     Problem: GASTROINTESTINAL - ADULT  Goal: Minimal or absence of nausea and/or vomiting  Description: INTERVENTIONS:  - Administer IV fluids if ordered to ensure adequate hydration  - Maintain NPO status until nausea and vomiting are resolved  - Nasogastric tube if ordered  - Administer ordered antiemetic medications as needed  - Provide nonpharmacologic comfort measures as appropriate  - Advance diet as tolerated, if ordered  - Consider nutrition services referral to assist patient with adequate nutrition and appropriate food choices  Outcome: Progressing  Goal: Maintains or returns to baseline bowel function  Description: INTERVENTIONS:  - Assess bowel function  - Encourage oral fluids to ensure adequate hydration  - Administer IV fluids if ordered to ensure adequate hydration  - Administer ordered medications as needed  - Encourage mobilization and activity  - Consider nutritional services referral to assist patient with adequate nutrition and appropriate food choices  Outcome: Progressing  Goal: Maintains adequate nutritional intake  Description: INTERVENTIONS:  - Monitor percentage of each meal consumed  - Identify factors contributing to decreased intake, treat as appropriate  - Assist with meals as needed  - Monitor I&O, weight, and lab values if indicated  - Obtain nutrition services referral as needed  Outcome: Progressing     Problem: METABOLIC, FLUID AND ELECTROLYTES - ADULT  Goal: Electrolytes maintained within normal limits  Description: INTERVENTIONS:  - Monitor labs and assess patient for signs and symptoms of electrolyte imbalances  - Administer electrolyte replacement as ordered  - Monitor response to electrolyte replacements, including repeat lab results as appropriate  - Instruct patient on fluid and nutrition as appropriate  Outcome: Progressing  Goal: Fluid balance maintained  Description: INTERVENTIONS:  - Monitor labs   - Monitor I/O and WT  - Instruct patient on fluid and nutrition as appropriate  - Assess for signs & symptoms of volume excess or deficit  Outcome: Progressing     Problem: PAIN - ADULT  Goal: Verbalizes/displays adequate comfort level or baseline comfort level  Description: Interventions:  - Encourage patient to monitor pain and request assistance  - Assess pain using appropriate pain scale  - Administer analgesics based on type and severity of pain and evaluate response  - Implement non-pharmacological measures as appropriate and evaluate response  - Consider cultural and social influences on pain and pain management  - Notify physician/advanced practitioner if interventions unsuccessful or patient reports new pain  Outcome: Progressing     Problem: INFECTION - ADULT  Goal: Absence or prevention of progression during hospitalization  Description: INTERVENTIONS:  - Assess and monitor for signs and symptoms of infection  - Monitor lab/diagnostic results  - Monitor all insertion sites, i e  indwelling lines, tubes, and drains  - Monitor endotracheal if appropriate and nasal secretions for changes in amount and color  - [place] appropriate cooling/warming therapies per order  - Administer medications as ordered  - Instruct and encourage patient and family to use good hand hygiene technique  - Identify and instruct in appropriate isolation precautions for identified infection/condition  Outcome: Progressing     Problem: SAFETY ADULT  Goal: Maintain or return to baseline ADL function  Description: INTERVENTIONS:  -  Assess patient's ability to carry out ADLs; assess patient's baseline for ADL function and identify physical deficits which impact ability to perform ADLs (bathing, care of mouth/teeth, toileting, grooming, dressing, etc )  - Assess/evaluate cause of self-care deficits   - Assess range of motion  - Assess patient's mobility; develop plan if impaired  - Assess patient's need for assistive devices and provide as appropriate  - Encourage maximum independence but intervene and supervise when necessary  - Involve family in performance of ADLs  - Assess for home care needs following discharge   - Consider OT consult to assist with ADL evaluation and planning for discharge  - Provide patient education as appropriate  Outcome: Progressing  Goal: Maintains/Returns to pre admission functional level  Description: INTERVENTIONS:  - Perform BMAT or MOVE assessment daily    - Set and communicate daily mobility goal to care team and patient/family/caregiver  - Collaborate with rehabilitation services on mobility goals if consulted  - Perform Range of Motion 3-4 times a day  - Reposition patient every 2 hours    - Dangle patient 3 times a day  - Stand patient 3 times a day  - Ambulate patient 3 times a day  - Out of bed to chair 3 times a day   - Out of bed for meals 3 times a day  - Out of bed for toileting  - Record patient progress and toleration of activity level   Outcome: Progressing  Goal: Patient will remain free of falls  Description: INTERVENTIONS:  - Educate patient/family on patient safety including physical limitations  - Instruct patient to call for assistance with activity   - Consult OT/PT to assist with strengthening/mobility   - Keep Call bell within reach  - Keep bed low and locked with side rails adjusted as appropriate  - Keep care items and personal belongings within reach  - Initiate and maintain comfort rounds  - Make Fall Risk Sign visible to staff  - Offer Toileting every 2 Hours, in advance of need  - Initiate/Maintain bed/ chair alarm  - Apply yellow socks and bracelet for high fall risk patients  - Consider moving patient to room near nurses station  Outcome: Progressing     Problem: DISCHARGE PLANNING  Goal: Discharge to home or other facility with appropriate resources  Description: INTERVENTIONS:  - Identify barriers to discharge w/patient and caregiver  - Arrange for needed discharge resources and transportation as appropriate  - Identify discharge learning needs (meds, wound care, etc )  - Arrange for interpretive services to assist at discharge as needed  - Refer to Case Management Department for coordinating discharge planning if the patient needs post-hospital services based on physician/advanced practitioner order or complex needs related to functional status, cognitive ability, or social support system  Outcome: Progressing     Problem: Knowledge Deficit  Goal: Patient/family/caregiver demonstrates understanding of disease process, treatment plan, medications, and discharge instructions  Description: Complete learning assessment and assess knowledge base  Interventions:  - Provide teaching at level of understanding  - Provide teaching via preferred learning methods  Outcome: Progressing     Problem: Nutrition/Hydration-ADULT  Goal: Nutrient/Hydration intake appropriate for improving, restoring or maintaining nutritional needs  Description: Monitor and assess patient's nutrition/hydration status for malnutrition  Collaborate with interdisciplinary team and initiate plan and interventions as ordered  Monitor patient's weight and dietary intake as ordered or per policy  Utilize nutrition screening tool and intervene as necessary  Determine patient's food preferences and provide high-protein, high-caloric foods as appropriate       INTERVENTIONS:  - Monitor oral intake, urinary output, labs, and treatment plans  - Assess nutrition and hydration status and recommend course of action  - Evaluate amount of meals eaten  - Assist patient with eating if necessary   - Allow adequate time for meals  - Recommend/ encourage appropriate diets, oral nutritional supplements, and vitamin/mineral supplements  - Order, calculate, and assess calorie counts as needed  - Recommend, monitor, and adjust tube feedings and TPN/PPN based on assessed needs  - Assess need for intravenous fluids  - Provide specific nutrition/hydration education as appropriate  - Include patient/family/caregiver in decisions related to nutrition  Outcome: Progressing

## 2023-06-08 NOTE — DISCHARGE INSTR - AVS FIRST PAGE
Expect to see blood in the urine, and have burning urgency and frequency  You had a small bladder tumor that was removed and the base was burned with electrocautery  We will await the pathology reading of this  It looks like a small bladder cancer  You do not need any further treatment for this superficial lesion but you will need another cystoscopy in 3 months    Our office will reach out to you to set up this with Dr Haleigh Alexander in 3 months   ---------------------------------------------------------------------------------------------------------------  Continue prednisone 20 mg three times a day for 3 more days, then transition to twice daily and follow up with your GI provider

## 2023-06-08 NOTE — QUICK NOTE
Patient underwent cystoscopy with removal of small bladder tumor  My office has been tasked to set up cystoscopy in 3 months with Dr Kimi Reynoso  They will call her to set this up  Instructions left on her chart  No further treatment necessary at this time  Await pathology

## 2023-06-09 DIAGNOSIS — L50.1 CHRONIC IDIOPATHIC URTICARIA: Primary | ICD-10-CM

## 2023-06-09 LAB
ALBUMIN SERPL BCP-MCNC: 3.2 G/DL (ref 3.5–5)
ALP SERPL-CCNC: 59 U/L (ref 34–104)
ALT SERPL W P-5'-P-CCNC: 38 U/L (ref 7–52)
ANION GAP SERPL CALCULATED.3IONS-SCNC: 8 MMOL/L (ref 4–13)
AST SERPL W P-5'-P-CCNC: 12 U/L (ref 13–39)
BILIRUB SERPL-MCNC: 0.23 MG/DL (ref 0.2–1)
BUN SERPL-MCNC: 20 MG/DL (ref 5–25)
CALCIUM ALBUM COR SERPL-MCNC: 8.5 MG/DL (ref 8.3–10.1)
CALCIUM SERPL-MCNC: 7.9 MG/DL (ref 8.4–10.2)
CHLORIDE SERPL-SCNC: 102 MMOL/L (ref 96–108)
CO2 SERPL-SCNC: 26 MMOL/L (ref 21–32)
CREAT SERPL-MCNC: 0.69 MG/DL (ref 0.6–1.3)
ERYTHROCYTE [DISTWIDTH] IN BLOOD BY AUTOMATED COUNT: 15 % (ref 11.6–15.1)
GFR SERPL CREATININE-BSD FRML MDRD: 101 ML/MIN/1.73SQ M
GLUCOSE SERPL-MCNC: 132 MG/DL (ref 65–140)
HCT VFR BLD AUTO: 39.7 % (ref 34.8–46.1)
HGB BLD-MCNC: 13 G/DL (ref 11.5–15.4)
MAGNESIUM SERPL-MCNC: 2.1 MG/DL (ref 1.9–2.7)
MCH RBC QN AUTO: 30.3 PG (ref 26.8–34.3)
MCHC RBC AUTO-ENTMCNC: 32.7 G/DL (ref 31.4–37.4)
MCV RBC AUTO: 93 FL (ref 82–98)
PLATELET # BLD AUTO: 514 THOUSANDS/UL (ref 149–390)
PMV BLD AUTO: 8.6 FL (ref 8.9–12.7)
POTASSIUM SERPL-SCNC: 3.7 MMOL/L (ref 3.5–5.3)
PROT SERPL-MCNC: 5.2 G/DL (ref 6.4–8.4)
RBC # BLD AUTO: 4.29 MILLION/UL (ref 3.81–5.12)
SODIUM SERPL-SCNC: 136 MMOL/L (ref 135–147)
WBC # BLD AUTO: 21.84 THOUSAND/UL (ref 4.31–10.16)

## 2023-06-09 PROCEDURE — 99233 SBSQ HOSP IP/OBS HIGH 50: CPT | Performed by: HOSPITALIST

## 2023-06-09 PROCEDURE — 85027 COMPLETE CBC AUTOMATED: CPT | Performed by: HOSPITALIST

## 2023-06-09 PROCEDURE — 83735 ASSAY OF MAGNESIUM: CPT | Performed by: HOSPITALIST

## 2023-06-09 PROCEDURE — 80053 COMPREHEN METABOLIC PANEL: CPT | Performed by: HOSPITALIST

## 2023-06-09 RX ORDER — CHOLECALCIFEROL (VITAMIN D3) 10(400)/ML
1200 DROPS ORAL 2 TIMES DAILY
Status: DISCONTINUED | OUTPATIENT
Start: 2023-06-09 | End: 2023-06-13 | Stop reason: HOSPADM

## 2023-06-09 RX ORDER — FAMOTIDINE 40 MG/1
40 TABLET, FILM COATED ORAL
Status: DISCONTINUED | OUTPATIENT
Start: 2023-06-09 | End: 2023-06-13 | Stop reason: HOSPADM

## 2023-06-09 RX ORDER — ASCORBIC ACID 500 MG
500 TABLET ORAL 2 TIMES DAILY WITH MEALS
Status: DISCONTINUED | OUTPATIENT
Start: 2023-06-09 | End: 2023-06-13 | Stop reason: HOSPADM

## 2023-06-09 RX ORDER — PREDNISONE 20 MG/1
40 TABLET ORAL DAILY
Status: DISCONTINUED | OUTPATIENT
Start: 2023-06-09 | End: 2023-06-11

## 2023-06-09 RX ORDER — POTASSIUM CHLORIDE 14.9 MG/ML
20 INJECTION INTRAVENOUS ONCE
Status: COMPLETED | OUTPATIENT
Start: 2023-06-09 | End: 2023-06-09

## 2023-06-09 RX ORDER — PANTOPRAZOLE SODIUM 40 MG/1
40 TABLET, DELAYED RELEASE ORAL
Status: DISCONTINUED | OUTPATIENT
Start: 2023-06-09 | End: 2023-06-13 | Stop reason: HOSPADM

## 2023-06-09 RX ORDER — EPINEPHRINE 1 MG/ML
0.3 INJECTION, SOLUTION, CONCENTRATE INTRAVENOUS
OUTPATIENT
Start: 2023-06-12

## 2023-06-09 RX ADMIN — ONDANSETRON 4 MG: 2 INJECTION INTRAMUSCULAR; INTRAVENOUS at 13:43

## 2023-06-09 RX ADMIN — FAMOTIDINE 40 MG: 40 TABLET, FILM COATED ORAL at 17:17

## 2023-06-09 RX ADMIN — Medication 100 MG: at 12:49

## 2023-06-09 RX ADMIN — HYDROMORPHONE HYDROCHLORIDE 1 MG: 1 INJECTION, SOLUTION INTRAMUSCULAR; INTRAVENOUS; SUBCUTANEOUS at 20:11

## 2023-06-09 RX ADMIN — HYDROMORPHONE HYDROCHLORIDE 1 MG: 1 INJECTION, SOLUTION INTRAMUSCULAR; INTRAVENOUS; SUBCUTANEOUS at 07:45

## 2023-06-09 RX ADMIN — SODIUM CHLORIDE 75 ML/HR: 0.9 INJECTION, SOLUTION INTRAVENOUS at 02:01

## 2023-06-09 RX ADMIN — HYDROMORPHONE HYDROCHLORIDE 1 MG: 1 INJECTION, SOLUTION INTRAMUSCULAR; INTRAVENOUS; SUBCUTANEOUS at 13:43

## 2023-06-09 RX ADMIN — PANTOPRAZOLE SODIUM 40 MG: 40 TABLET, DELAYED RELEASE ORAL at 12:49

## 2023-06-09 RX ADMIN — LORAZEPAM 1 MG: 2 INJECTION INTRAMUSCULAR; INTRAVENOUS at 17:58

## 2023-06-09 RX ADMIN — ONDANSETRON 4 MG: 2 INJECTION INTRAMUSCULAR; INTRAVENOUS at 07:45

## 2023-06-09 RX ADMIN — DIPHENHYDRAMINE HYDROCHLORIDE 50 MG: 50 INJECTION, SOLUTION INTRAMUSCULAR; INTRAVENOUS at 17:58

## 2023-06-09 RX ADMIN — Medication 100 MG: at 20:19

## 2023-06-09 RX ADMIN — SODIUM CHLORIDE 75 ML/HR: 0.9 INJECTION, SOLUTION INTRAVENOUS at 13:38

## 2023-06-09 RX ADMIN — PREDNISONE 40 MG: 20 TABLET ORAL at 12:32

## 2023-06-09 RX ADMIN — DIPHENHYDRAMINE HYDROCHLORIDE 50 MG: 50 INJECTION, SOLUTION INTRAMUSCULAR; INTRAVENOUS at 23:39

## 2023-06-09 RX ADMIN — DIPHENHYDRAMINE HYDROCHLORIDE 50 MG: 50 INJECTION, SOLUTION INTRAMUSCULAR; INTRAVENOUS at 12:20

## 2023-06-09 RX ADMIN — LORAZEPAM 1 MG: 2 INJECTION INTRAMUSCULAR; INTRAVENOUS at 23:39

## 2023-06-09 RX ADMIN — LORAZEPAM 1 MG: 2 INJECTION INTRAMUSCULAR; INTRAVENOUS at 05:41

## 2023-06-09 RX ADMIN — LORAZEPAM 1 MG: 2 INJECTION INTRAMUSCULAR; INTRAVENOUS at 12:20

## 2023-06-09 RX ADMIN — ONDANSETRON 4 MG: 2 INJECTION INTRAMUSCULAR; INTRAVENOUS at 20:11

## 2023-06-09 RX ADMIN — METHYLPREDNISOLONE SODIUM SUCCINATE 20 MG: 40 INJECTION, POWDER, FOR SOLUTION INTRAMUSCULAR; INTRAVENOUS at 05:42

## 2023-06-09 RX ADMIN — POTASSIUM CHLORIDE 20 MEQ: 14.9 INJECTION, SOLUTION INTRAVENOUS at 12:32

## 2023-06-09 RX ADMIN — DIPHENHYDRAMINE HYDROCHLORIDE 50 MG: 50 INJECTION, SOLUTION INTRAMUSCULAR; INTRAVENOUS at 05:44

## 2023-06-09 RX ADMIN — OXYCODONE HYDROCHLORIDE AND ACETAMINOPHEN 500 MG: 500 TABLET ORAL at 17:16

## 2023-06-09 NOTE — PLAN OF CARE
Problem: MOBILITY - ADULT  Goal: Maintain or return to baseline ADL function  Description: INTERVENTIONS:  -  Assess patient's ability to carry out ADLs; assess patient's baseline for ADL function and identify physical deficits which impact ability to perform ADLs (bathing, care of mouth/teeth, toileting, grooming, dressing, etc )  - Assess/evaluate cause of self-care deficits   - Assess range of motion  - Assess patient's mobility; develop plan if impaired  - Assess patient's need for assistive devices and provide as appropriate  - Encourage maximum independence but intervene and supervise when necessary  - Involve family in performance of ADLs  - Assess for home care needs following discharge   - Consider OT consult to assist with ADL evaluation and planning for discharge  - Provide patient education as appropriate  Outcome: Progressing  Goal: Maintains/Returns to pre admission functional level  Description: INTERVENTIONS:  - Perform BMAT or MOVE assessment daily    - Set and communicate daily mobility goal to care team and patient/family/caregiver  - Collaborate with rehabilitation services on mobility goals if consulted  - Perform Range of Motion 3-4 times a day  - Reposition patient every 2 hours    - Dangle patient 3 times a day  - Stand patient 3 times a day  - Ambulate patient 3 times a day  - Out of bed to chair 3 times a day   - Out of bed for meals 3 times a day  - Out of bed for toileting  - Record patient progress and toleration of activity level   Outcome: Progressing     Problem: GASTROINTESTINAL - ADULT  Goal: Minimal or absence of nausea and/or vomiting  Description: INTERVENTIONS:  - Administer IV fluids if ordered to ensure adequate hydration  - Maintain NPO status until nausea and vomiting are resolved  - Nasogastric tube if ordered  - Administer ordered antiemetic medications as needed  - Provide nonpharmacologic comfort measures as appropriate  - Advance diet as tolerated, if ordered  - Consider nutrition services referral to assist patient with adequate nutrition and appropriate food choices  Outcome: Progressing  Goal: Maintains or returns to baseline bowel function  Description: INTERVENTIONS:  - Assess bowel function  - Encourage oral fluids to ensure adequate hydration  - Administer IV fluids if ordered to ensure adequate hydration  - Administer ordered medications as needed  - Encourage mobilization and activity  - Consider nutritional services referral to assist patient with adequate nutrition and appropriate food choices  Outcome: Progressing  Goal: Maintains adequate nutritional intake  Description: INTERVENTIONS:  - Monitor percentage of each meal consumed  - Identify factors contributing to decreased intake, treat as appropriate  - Assist with meals as needed  - Monitor I&O, weight, and lab values if indicated  - Obtain nutrition services referral as needed  Outcome: Progressing     Problem: METABOLIC, FLUID AND ELECTROLYTES - ADULT  Goal: Electrolytes maintained within normal limits  Description: INTERVENTIONS:  - Monitor labs and assess patient for signs and symptoms of electrolyte imbalances  - Administer electrolyte replacement as ordered  - Monitor response to electrolyte replacements, including repeat lab results as appropriate  - Instruct patient on fluid and nutrition as appropriate  Outcome: Progressing  Goal: Fluid balance maintained  Description: INTERVENTIONS:  - Monitor labs   - Monitor I/O and WT  - Instruct patient on fluid and nutrition as appropriate  - Assess for signs & symptoms of volume excess or deficit  Outcome: Progressing     Problem: PAIN - ADULT  Goal: Verbalizes/displays adequate comfort level or baseline comfort level  Description: Interventions:  - Encourage patient to monitor pain and request assistance  - Assess pain using appropriate pain scale  - Administer analgesics based on type and severity of pain and evaluate response  - Implement non-pharmacological measures as appropriate and evaluate response  - Consider cultural and social influences on pain and pain management  - Notify physician/advanced practitioner if interventions unsuccessful or patient reports new pain  Outcome: Progressing     Problem: INFECTION - ADULT  Goal: Absence or prevention of progression during hospitalization  Description: INTERVENTIONS:  - Assess and monitor for signs and symptoms of infection  - Monitor lab/diagnostic results  - Monitor all insertion sites, i e  indwelling lines, tubes, and drains  - Monitor endotracheal if appropriate and nasal secretions for changes in amount and color  - Heaters appropriate cooling/warming therapies per order  - Administer medications as ordered  - Instruct and encourage patient and family to use good hand hygiene technique  - Identify and instruct in appropriate isolation precautions for identified infection/condition  Outcome: Progressing     Problem: SAFETY ADULT  Goal: Maintain or return to baseline ADL function  Description: INTERVENTIONS:  -  Assess patient's ability to carry out ADLs; assess patient's baseline for ADL function and identify physical deficits which impact ability to perform ADLs (bathing, care of mouth/teeth, toileting, grooming, dressing, etc )  - Assess/evaluate cause of self-care deficits   - Assess range of motion  - Assess patient's mobility; develop plan if impaired  - Assess patient's need for assistive devices and provide as appropriate  - Encourage maximum independence but intervene and supervise when necessary  - Involve family in performance of ADLs  - Assess for home care needs following discharge   - Consider OT consult to assist with ADL evaluation and planning for discharge  - Provide patient education as appropriate  Outcome: Progressing  Goal: Maintains/Returns to pre admission functional level  Description: INTERVENTIONS:  - Perform BMAT or MOVE assessment daily    - Set and communicate daily mobility goal to care team and patient/family/caregiver  - Collaborate with rehabilitation services on mobility goals if consulted  - Perform Range of Motion 3-4 times a day  - Reposition patient every 2 hours    - Dangle patient 3 times a day  - Stand patient 3 times a day  - Ambulate patient 3 times a day  - Out of bed to chair 3 times a day   - Out of bed for meals 3 times a day  - Out of bed for toileting  - Record patient progress and toleration of activity level   Outcome: Progressing  Goal: Patient will remain free of falls  Description: INTERVENTIONS:  - Educate patient/family on patient safety including physical limitations  - Instruct patient to call for assistance with activity   - Consult OT/PT to assist with strengthening/mobility   - Keep Call bell within reach  - Keep bed low and locked with side rails adjusted as appropriate  - Keep care items and personal belongings within reach  - Initiate and maintain comfort rounds  - Make Fall Risk Sign visible to staff  - Offer Toileting every 2 Hours, in advance of need  - Initiate/Maintain bed/ chair alarm  - Apply yellow socks and bracelet for high fall risk patients  - Consider moving patient to room near nurses station  Outcome: Progressing     Problem: DISCHARGE PLANNING  Goal: Discharge to home or other facility with appropriate resources  Description: INTERVENTIONS:  - Identify barriers to discharge w/patient and caregiver  - Arrange for needed discharge resources and transportation as appropriate  - Identify discharge learning needs (meds, wound care, etc )  - Arrange for interpretive services to assist at discharge as needed  - Refer to Case Management Department for coordinating discharge planning if the patient needs post-hospital services based on physician/advanced practitioner order or complex needs related to functional status, cognitive ability, or social support system  Outcome: Progressing     Problem: Knowledge Deficit  Goal: Patient/family/caregiver demonstrates understanding of disease process, treatment plan, medications, and discharge instructions  Description: Complete learning assessment and assess knowledge base  Interventions:  - Provide teaching at level of understanding  - Provide teaching via preferred learning methods  Outcome: Progressing     Problem: Nutrition/Hydration-ADULT  Goal: Nutrient/Hydration intake appropriate for improving, restoring or maintaining nutritional needs  Description: Monitor and assess patient's nutrition/hydration status for malnutrition  Collaborate with interdisciplinary team and initiate plan and interventions as ordered  Monitor patient's weight and dietary intake as ordered or per policy  Utilize nutrition screening tool and intervene as necessary  Determine patient's food preferences and provide high-protein, high-caloric foods as appropriate       INTERVENTIONS:  - Monitor oral intake, urinary output, labs, and treatment plans  - Assess nutrition and hydration status and recommend course of action  - Evaluate amount of meals eaten  - Assist patient with eating if necessary   - Allow adequate time for meals  - Recommend/ encourage appropriate diets, oral nutritional supplements, and vitamin/mineral supplements  - Order, calculate, and assess calorie counts as needed  - Recommend, monitor, and adjust tube feedings and TPN/PPN based on assessed needs  - Assess need for intravenous fluids  - Provide specific nutrition/hydration education as appropriate  - Include patient/family/caregiver in decisions related to nutrition  Outcome: Progressing

## 2023-06-09 NOTE — PROGRESS NOTES
[facility] [address]  Progress Note  Name: Geo Marquez  MRN: [id number]  Unit/Bed#: 882-10 I Date of Admission: 5/29/2023   Date of Service: 6/9/2023 I Hospital Day: 11    Assessment/Plan   * Crohn's colitis [facility])  Assessment & Plan  · Stable, no bloody diarrhea  · On Stelara and omalizumab infusion treatment as outpatient  · Patient missed all infusions past week prior to admission  · She reports taking budesonide 3 mg 3 times daily at home previously but now states she was most recently taking prednisone, unclear what dose but seems like most recent was 40 mg twice daily  · GI consulted, appreciate  · She completed prior IV course for GI  · She can resume PO steroids on discharge - plan to resume PO 6/9, see below under MAST cell  · Continues to have some abdominal pain, variable location      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  · Continue Benadryl 50 mg IM twice daily, Protonix 40 mg twice daily, pepcid twice daily, and steroids --> now s/p clint-operative prep and post need for IV medications  · Switching from IV formulations to PO (her home meds which she tolerates at home)  · Monitor symptoms  · Continue IVF  · Monitor labs    Bladder tumor  Assessment & Plan  · Patient had been having prior urinary issues before admission, planned for outpatient cystoscopy  · Patient reported bright red blood in urine 6/3, then reported pink tinged and she is having some symptoms of frequency and straining with urination  · Patient was scheduled for outpatient procedure, for which she typically needs significant prep 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  · Her urologist was contacted, see chart, plan for while inpatient as she missed her appointment and given her needs for prep/monitoring  · S/p cystoscopy 6/8, which found likely bladder tumor consistent with TCC, underwent fulguration and biopsy  · Urology to follow biopsy and further management    Seizure [facility])  Assessment & Plan  · Ativan as needed  · Not on any other seizure medications at home  · EEG completed  Patient had one of her typical events  No epileptic activity associated with the event  Consistent with psychogenic seizures, though seizures can be result of Mast cell disease  · No evidence further seizures   · Recommend outpatient therapy    Hypothyroidism (acquired)  Assessment & Plan  · TSH WNL 1 889  · Continue levothyroxine 100 mcg daily    SIRS (systemic inflammatory response syndrome) (HCC)  Assessment & Plan  SIRS, resolved, possibly due to Crohn's disease/Viral gastroenteritis; presented with WBC 13 25 and   · Resolved SIRS, see above            Vomiting and diarrhea  Assessment & Plan  Presented to ED with ongoing nonbloody nonbilious emesis and nonbloody diarrhea since EGD colonoscopy on 5/2/2023  · Stool C  difficile and PCR negative  · Met SIRS criteria on admission likely due to Crohn's vs viral gastroenteritis and resovled  · Symptoms improved/resolved      Hematuria-resolved as of 6/9/2023  Assessment & Plan  See                VTE Pharmacologic Prophylaxis: VTE Score: 3 Moderate Risk (Score 3-4) - Pharmacological DVT Prophylaxis Contraindicated  Sequential Compression Devices Ordered  Patient Centered Rounds: I performed bedside rounds with nursing staff today  Total Time Spent on Date of Encounter in care of patient: 45 minutes This time was spent on one or more of the following: performing physical exam; counseling and coordination of care; obtaining or reviewing history; documenting in the medical record; reviewing/ordering tests, medications or procedures; communicating with other healthcare professionals and discussing with patient's family/caregivers      Current Length of Stay: 11 day(s)  Current Patient Status: Inpatient   Certification Statement: The patient will continue to require additional inpatient hospital stay due to further monitoring for Mast cell post procedure and transition to PO medications  Discharge Plan: Anticipate discharge in 24-48 hrs to home  Code Status: Level 1 - Full Code    Subjective:   Patient continues to have a variable appetite, some last night about her meals but reports having a good lunch yesterday  She continues to have variable abdominal pain as well, similar to prior  She reports she has better urine flow now but is a little bit of burning with urination, she reports a tinge of blood in her urine as well which is anticipated after procedure and fulguration  Objective:     Vitals:   Temp (24hrs), Av 4 °F (36 9 °C), Min:98 3 °F (36 8 °C), Max:98 6 °F (37 °C)    Temp:  [98 3 °F (36 8 °C)-98 6 °F (37 °C)] 98 3 °F (36 8 °C)  HR:  [58-74] 58  Resp:  [17] 17  BP: (130-136)/(73-90) 136/73  SpO2:  [92 %-94 %] 94 %  Body mass index is 25 61 kg/m²  Input and Output Summary (last 24 hours): Intake/Output Summary (Last 24 hours) at 2023 0905  Last data filed at 2023 0202  Gross per 24 hour   Intake 240 ml   Output 1500 ml   Net -1260 ml       Physical Exam:   Physical Exam  Vitals and nursing note reviewed  Constitutional:       General: She is not in acute distress  Appearance: She is well-developed  HENT:      Head: Normocephalic and atraumatic  Eyes:      Conjunctiva/sclera: Conjunctivae normal    Cardiovascular:      Rate and Rhythm: Normal rate and regular rhythm  Heart sounds: No murmur heard  Pulmonary:      Effort: Pulmonary effort is normal  No respiratory distress  Breath sounds: Normal breath sounds  Abdominal:      Palpations: Abdomen is soft  Tenderness: There is no abdominal tenderness  Comments: Mild abdominal tenderness periumbilical region   Musculoskeletal:         General: No swelling  Cervical back: Neck supple  Skin:     General: Skin is warm and dry  Capillary Refill: Capillary refill takes less than 2 seconds     Neurological: Mental Status: She is alert  Psychiatric:         Mood and Affect: Mood normal           Additional Data:     Labs:  Results from last 7 days   Lab Units 06/09/23  0553 06/05/23  0533 06/04/23  0617   EOS PCT %  --   --  0   HEMATOCRIT % 39 7   < > 40 3   HEMOGLOBIN g/dL 13 0   < > 13 3   LYMPHS PCT %  --   --  12*   MONOS PCT %  --   --  7   NEUTROS PCT %  --   --  79*   PLATELETS Thousands/uL 514*   < > 517*   WBC Thousand/uL 21 84*   < > 14 39*    < > = values in this interval not displayed  Results from last 7 days   Lab Units 06/09/23  0553   ANION GAP mmol/L 8   ALBUMIN g/dL 3 2*   ALK PHOS U/L 59   ALT U/L 38   AST U/L 12*   BUN mg/dL 20   CALCIUM mg/dL 7 9*   CHLORIDE mmol/L 102   CO2 mmol/L 26   CREATININE mg/dL 0 69   GLUCOSE RANDOM mg/dL 132   POTASSIUM mmol/L 3 7   SODIUM mmol/L 136   TOTAL BILIRUBIN mg/dL 0 23                       Lines/Drains:  Invasive Devices     Peripheral Intravenous Line  Duration           Peripheral IV 06/08/23 Distal;Dorsal (posterior); Right Forearm <1 day                      Imaging: No pertinent imaging reviewed      Recent Cultures (last 7 days):         Last 24 Hours Medication List:   Current Facility-Administered Medications   Medication Dose Route Frequency Provider Last Rate   • acetaminophen  650 mg Oral Q6H PRN Leeroy Mckee MD     • albuterol  2 puff Inhalation Q4H PRN Leeroy Mckee MD     • butalbital-acetaminophen-caffeine  1 tablet Oral Q8H PRN Leeroy Mckee MD     • cholecalciferol  1,000 Units Oral BID Leeroy Mckee MD     • diphenhydrAMINE  50 mg Intramuscular Q6H Leeroy Mckee MD     • diphenhydrAMINE  50 mg Intravenous Q6H PRN Leeroy Mckee MD     • famotidine  20 mg Oral BID Pro Dominguez, DO     • HYDROmorphone  1 mg Intravenous Q4H PRN Leeroy Mckee MD     • levothyroxine  100 mcg Oral Early Morning Leeroy Mckee MD     • LORazepam  1 mg Intravenous Q4H PRN Leeroy Mckee MD     • ondansetron  4 mg Intravenous Q6H PRN Leeroy Mckee MD     • pantoprazole  40 mg Oral Early Morning Pro Chirag Counts, DO     • potassium chloride  20 mEq Intravenous Once Pro Chirag Counts, DO     • potassium-sodium phosphateS  1 tablet Oral TID With Meals Alexey Chacon MD     • predniSONE  20 mg Oral Daily PRN Alexey Chacon MD     • predniSONE  40 mg Oral Daily Pro Chirag Counts, DO     • sodium chloride  75 mL/hr Intravenous Continuous Alexey Chacon MD 75 mL/hr (06/09/23 0201)        Today, Patient Was Seen By: Maria C Dominguez DO    **Please Note: This note may have been constructed using a voice recognition system  **

## 2023-06-09 NOTE — QUICK NOTE
No need for urological inpatient management  Plan for tentative hospital discharge by medicine tomorrow  Dr Netta Alvarez performed cystoscopy yesterday with removal of small bladder tumor  Follow-up with office cystoscopy in 3 months with Dr Jennifer Ness contacted the urology office to set up outpatient appointment  Discharge instructions placed on the chart from Dr Jennifer Ness as follows: She should expect to see blood in the urine, and have burning urgency and frequency  Will await the pathology reading of this and will not be available at time of hospital discharge  She was told that she will need another cystoscopy in 3 months with Dr Vicki Vasquez  The urology office will reach out to her to set up this with Dr Vicki Vasquez in 3 months      Tammy Portillo PA-C

## 2023-06-10 LAB — GLUCOSE SERPL-MCNC: 100 MG/DL (ref 65–140)

## 2023-06-10 PROCEDURE — 82948 REAGENT STRIP/BLOOD GLUCOSE: CPT

## 2023-06-10 PROCEDURE — 99233 SBSQ HOSP IP/OBS HIGH 50: CPT | Performed by: HOSPITALIST

## 2023-06-10 RX ORDER — LIOTHYRONINE SODIUM 5 UG/1
5 TABLET ORAL DAILY
Status: DISCONTINUED | OUTPATIENT
Start: 2023-06-11 | End: 2023-06-13 | Stop reason: HOSPADM

## 2023-06-10 RX ORDER — LEVOTHYROXINE SODIUM 0.1 MG/1
100 TABLET ORAL
Status: DISCONTINUED | OUTPATIENT
Start: 2023-06-10 | End: 2023-06-10

## 2023-06-10 RX ORDER — LEVOTHYROXINE SODIUM 0.05 MG/1
75 TABLET ORAL
Status: DISCONTINUED | OUTPATIENT
Start: 2023-06-11 | End: 2023-06-13 | Stop reason: HOSPADM

## 2023-06-10 RX ADMIN — SODIUM CHLORIDE 75 ML/HR: 0.9 INJECTION, SOLUTION INTRAVENOUS at 14:16

## 2023-06-10 RX ADMIN — DIPHENHYDRAMINE HYDROCHLORIDE 50 MG: 50 INJECTION, SOLUTION INTRAMUSCULAR; INTRAVENOUS at 23:12

## 2023-06-10 RX ADMIN — ONDANSETRON 4 MG: 2 INJECTION INTRAMUSCULAR; INTRAVENOUS at 20:08

## 2023-06-10 RX ADMIN — LORAZEPAM 1 MG: 2 INJECTION INTRAMUSCULAR; INTRAVENOUS at 06:18

## 2023-06-10 RX ADMIN — OXYCODONE HYDROCHLORIDE AND ACETAMINOPHEN 500 MG: 500 TABLET ORAL at 08:08

## 2023-06-10 RX ADMIN — LORAZEPAM 1 MG: 2 INJECTION INTRAMUSCULAR; INTRAVENOUS at 17:09

## 2023-06-10 RX ADMIN — HYDROMORPHONE HYDROCHLORIDE 1 MG: 1 INJECTION, SOLUTION INTRAMUSCULAR; INTRAVENOUS; SUBCUTANEOUS at 08:13

## 2023-06-10 RX ADMIN — DIPHENHYDRAMINE HYDROCHLORIDE 50 MG: 50 INJECTION, SOLUTION INTRAMUSCULAR; INTRAVENOUS at 17:09

## 2023-06-10 RX ADMIN — DIPHENHYDRAMINE HYDROCHLORIDE 50 MG: 50 INJECTION, SOLUTION INTRAMUSCULAR; INTRAVENOUS at 11:13

## 2023-06-10 RX ADMIN — Medication 1200 UNITS: at 08:09

## 2023-06-10 RX ADMIN — Medication 1200 UNITS: at 21:04

## 2023-06-10 RX ADMIN — HYDROMORPHONE HYDROCHLORIDE 1 MG: 1 INJECTION, SOLUTION INTRAMUSCULAR; INTRAVENOUS; SUBCUTANEOUS at 19:37

## 2023-06-10 RX ADMIN — PANTOPRAZOLE SODIUM 40 MG: 40 TABLET, DELAYED RELEASE ORAL at 06:19

## 2023-06-10 RX ADMIN — PREDNISONE 40 MG: 20 TABLET ORAL at 08:09

## 2023-06-10 RX ADMIN — HYDROMORPHONE HYDROCHLORIDE 1 MG: 1 INJECTION, SOLUTION INTRAMUSCULAR; INTRAVENOUS; SUBCUTANEOUS at 14:14

## 2023-06-10 RX ADMIN — LORAZEPAM 1 MG: 2 INJECTION INTRAMUSCULAR; INTRAVENOUS at 11:13

## 2023-06-10 RX ADMIN — OXYCODONE HYDROCHLORIDE AND ACETAMINOPHEN 500 MG: 500 TABLET ORAL at 17:09

## 2023-06-10 RX ADMIN — Medication 100 MG: at 21:05

## 2023-06-10 RX ADMIN — FAMOTIDINE 40 MG: 40 TABLET, FILM COATED ORAL at 17:10

## 2023-06-10 RX ADMIN — ONDANSETRON 4 MG: 2 INJECTION INTRAMUSCULAR; INTRAVENOUS at 08:21

## 2023-06-10 RX ADMIN — HYDROMORPHONE HYDROCHLORIDE 1 MG: 1 INJECTION, SOLUTION INTRAMUSCULAR; INTRAVENOUS; SUBCUTANEOUS at 01:24

## 2023-06-10 RX ADMIN — Medication 100 MG: at 08:12

## 2023-06-10 RX ADMIN — LORAZEPAM 1 MG: 2 INJECTION INTRAMUSCULAR; INTRAVENOUS at 23:10

## 2023-06-10 RX ADMIN — ONDANSETRON 4 MG: 2 INJECTION INTRAMUSCULAR; INTRAVENOUS at 14:17

## 2023-06-10 RX ADMIN — SODIUM CHLORIDE 75 ML/HR: 0.9 INJECTION, SOLUTION INTRAVENOUS at 01:34

## 2023-06-10 RX ADMIN — ONDANSETRON 4 MG: 2 INJECTION INTRAMUSCULAR; INTRAVENOUS at 02:19

## 2023-06-10 RX ADMIN — DIPHENHYDRAMINE HYDROCHLORIDE 50 MG: 50 INJECTION, SOLUTION INTRAMUSCULAR; INTRAVENOUS at 06:19

## 2023-06-10 NOTE — PROGRESS NOTES
[address]  Progress Note  Name: Nohelia Forrest  MRN: [id number]  Unit/Bed#: 870-83 I Date of Admission: 5/29/2023   Date of Service: 6/10/2023 I Hospital Day: 12    Assessment/Plan   * Crohn's colitis [facility])  Assessment & Plan  · Stable, no bloody diarrhea  · On Stelara and omalizumab infusion treatment as outpatient  · Patient missed all infusions past week prior to admission  · She reports taking budesonide 3 mg 3 times daily at home previously but now states she was most recently taking prednisone, unclear what dose but seems like most recent was 40 mg twice daily  · GI consulted, appreciate  · She completed prior IV course for GI  · She can resume PO steroids on discharge   · Continues to have some abdominal pain, variable location      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  · Continue Benadryl 50 mg IM twice daily, Protonix 40 mg twice daily, pepcid twice daily, and steroids --> now s/p clint-operative prep and post need for IV medications  · Switched from IV formulations to PO (her home meds which she tolerates at home)  · Monitor symptoms  · Continue IVF  · Symptoms controlled on home meds, mostly    Bladder tumor  Assessment & Plan  · Patient had been having prior urinary issues before admission, planned for outpatient cystoscopy  · Patient reported bright red blood in urine 6/3, then reported pink tinged and she is having some symptoms of frequency and straining with urination  · Patient was scheduled for outpatient procedure, for which she typically needs significant prep 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  · Her urologist was contacted, see chart, plan for while inpatient as she missed her appointment and given her needs for prep/monitoring  · S/p cystoscopy 6/8, which found likely bladder tumor consistent with TCC, underwent fulguration and biopsy  · Urology to follow biopsy and further management    Seizure [facility])  Assessment & Plan  · Ativan as needed  · Not on any other seizure medications at home  · EEG completed  Patient had one of her typical events  No epileptic activity associated with the event  Consistent with psychogenic seizures, though seizures can be result of Mast cell disease  · No evidence further seizures   · Recommend outpatient therapy    Hypothyroidism (acquired)  Assessment & Plan  · TSH WNL 1 889  · Continue levothyroxine 100 mcg daily    SIRS (systemic inflammatory response syndrome) (HCC)  Assessment & Plan  SIRS, resolved, possibly due to Crohn's disease/Viral gastroenteritis; presented with WBC 13 25 and   · Resolved SIRS, see above            Vomiting and diarrhea  Assessment & Plan  Presented to ED with ongoing nonbloody nonbilious emesis and nonbloody diarrhea since EGD colonoscopy on 5/2/2023  · Stool C  difficile and PCR negative  · Met SIRS criteria on admission likely due to Crohn's vs viral gastroenteritis and resovled  · Symptoms improved/resolved                   Patient Centered Rounds: I performed bedside rounds with nursing staff today  Total Time Spent on Date of Encounter in care of patient: 35 minutes This time was spent on one or more of the following: performing physical exam; counseling and coordination of care; obtaining or reviewing history; documenting in the medical record; reviewing/ordering tests, medications or procedures; communicating with other healthcare professionals and discussing with patient's family/caregivers  Current Length of Stay: 12 day(s)  Current Patient Status: Inpatient   Certification Statement: The patient will continue to require additional inpatient hospital stay due to further monitoring  Discharge Plan: Anticipate discharge tomorrow to home  Code Status: Level 1 - Full Code    Subjective:   Patient continues to have abdominal pain, she states she has been in the bathroom frequently this morning    She reports blood in her stool  Objective:     Vitals:   Temp (24hrs), Av 6 °F (37 °C), Min:98 5 °F (36 9 °C), Max:98 7 °F (37 1 °C)    Temp:  [98 5 °F (36 9 °C)-98 7 °F (37 1 °C)] 98 5 °F (36 9 °C)  HR:  [83-99] 94  Resp:  [18] 18  BP: (119-124)/(76-80) 119/76  SpO2:  [92 %-95 %] 95 %  Body mass index is 25 86 kg/m²  Input and Output Summary (last 24 hours): Intake/Output Summary (Last 24 hours) at 6/10/2023 1336  Last data filed at 6/10/2023 0127  Gross per 24 hour   Intake 3796 25 ml   Output 700 ml   Net 3096 25 ml       Physical Exam:   Physical Exam  Vitals and nursing note reviewed  Constitutional:       General: She is not in acute distress  Appearance: She is well-developed  HENT:      Head: Normocephalic and atraumatic  Eyes:      Conjunctiva/sclera: Conjunctivae normal    Cardiovascular:      Rate and Rhythm: Normal rate and regular rhythm  Heart sounds: No murmur heard  Pulmonary:      Effort: Pulmonary effort is normal  No respiratory distress  Breath sounds: Normal breath sounds  Abdominal:      Palpations: Abdomen is soft  Tenderness: There is no abdominal tenderness  Comments: Mild abdominal tenderness periumbilical region   Genitourinary:     Comments: Patient's last bowel movement was in toilet, there was some reddish coloration to the stool, however did not see overt blood or clots  Musculoskeletal:         General: No swelling  Cervical back: Neck supple  Skin:     General: Skin is warm and dry  Capillary Refill: Capillary refill takes less than 2 seconds  Neurological:      Mental Status: She is alert     Psychiatric:         Mood and Affect: Mood normal           Additional Data:     Labs:  Results from last 7 days   Lab Units 23  0553 23  0533 23  0617   EOS PCT %  --   --  0   HEMATOCRIT % 39 7   < > 40 3   HEMOGLOBIN g/dL 13 0   < > 13 3   LYMPHS PCT %  --   --  12*   MONOS PCT %  --   --  7   NEUTROS PCT %  --   --  79* PLATELETS Thousands/uL 514*   < > 517*   WBC Thousand/uL 21 84*   < > 14 39*    < > = values in this interval not displayed  Results from last 7 days   Lab Units 06/09/23  0553   ANION GAP mmol/L 8   ALBUMIN g/dL 3 2*   ALK PHOS U/L 59   ALT U/L 38   AST U/L 12*   BUN mg/dL 20   CALCIUM mg/dL 7 9*   CHLORIDE mmol/L 102   CO2 mmol/L 26   CREATININE mg/dL 0 69   GLUCOSE RANDOM mg/dL 132   POTASSIUM mmol/L 3 7   SODIUM mmol/L 136   TOTAL BILIRUBIN mg/dL 0 23         Results from last 7 days   Lab Units 06/10/23  0753   POC GLUCOSE mg/dl 100               Lines/Drains:  Invasive Devices     Peripheral Intravenous Line  Duration           Peripheral IV 06/08/23 Distal;Dorsal (posterior); Right Forearm 1 day                      Imaging: No pertinent imaging reviewed      Recent Cultures (last 7 days):         Last 24 Hours Medication List:   Current Facility-Administered Medications   Medication Dose Route Frequency Provider Last Rate   • acetaminophen  650 mg Oral Q6H PRN Phong Rosales MD     • albuterol  2 puff Inhalation Q4H PRN Phong Rosales MD     • ascorbic acid  500 mg Oral BID With Meals Pro Chirag Counts, DO     • butalbital-acetaminophen-caffeine  1 tablet Oral Q8H PRN Phong Rosales MD     • cholecalciferol  1,200 Units Oral BID Pro Chirag Counts, DO     • diphenhydrAMINE  50 mg Intramuscular Q6H Phong Rosales MD     • diphenhydrAMINE  50 mg Intravenous Q6H PRN Phong Rosales MD     • famotidine  40 mg Oral Daily With Dinner Pro Chirag Counts, DO     • HYDROmorphone  1 mg Intravenous Q4H PRFLORENCIO Rosales MD     • [START ON 6/11/2023] levothyroxine  75 mcg Oral Early Morning Pro Chirag Counts, DO     • liothyronine  5 mcg Oral Daily Pro Chirag Counts, DO     • LORazepam  1 mg Intravenous Q4H PRFLORENCIO Rosales MD     • ondansetron  4 mg Intravenous Q6H PRFLORENCIO Rosales MD     • pantoprazole  40 mg Oral Early Morning Pro Chirag Counts, DO     • potassium-sodium phosphateS  1 tablet Oral TID With Meals Cherylene Grave, MD     • predniSONE  20 mg Oral Daily PRN Cherylene Grave, MD     • predniSONE  40 mg Oral Daily Pro Beard Counts, DO     • Riboflavin  100 mg Oral BID Pro Beard Counts, DO     • sodium chloride  75 mL/hr Intravenous Continuous Cherylene Grave, MD 75 mL/hr (06/10/23 0134)        Today, Patient Was Seen By: Ulises Dominguez, DO    **Please Note: This note may have been constructed using a voice recognition system  **

## 2023-06-10 NOTE — PLAN OF CARE
Problem: MOBILITY - ADULT  Goal: Maintain or return to baseline ADL function  Description: INTERVENTIONS:  -  Assess patient's ability to carry out ADLs; assess patient's baseline for ADL function and identify physical deficits which impact ability to perform ADLs (bathing, care of mouth/teeth, toileting, grooming, dressing, etc )  - Assess/evaluate cause of self-care deficits   - Assess range of motion  - Assess patient's mobility; develop plan if impaired  - Assess patient's need for assistive devices and provide as appropriate  - Encourage maximum independence but intervene and supervise when necessary  - Involve family in performance of ADLs  - Assess for home care needs following discharge   - Consider OT consult to assist with ADL evaluation and planning for discharge  - Provide patient education as appropriate  Outcome: Progressing  Goal: Maintains/Returns to pre admission functional level  Description: INTERVENTIONS:  - Perform BMAT or MOVE assessment daily    - Set and communicate daily mobility goal to care team and patient/family/caregiver  - Collaborate with rehabilitation services on mobility goals if consulted  - Perform Range of Motion 3-4 times a day  - Reposition patient every 2 hours    - Dangle patient 3 times a day  - Stand patient 3 times a day  - Ambulate patient 3 times a day  - Out of bed to chair 3 times a day   - Out of bed for meals 3 times a day  - Out of bed for toileting  - Record patient progress and toleration of activity level   Outcome: Progressing     Problem: GASTROINTESTINAL - ADULT  Goal: Minimal or absence of nausea and/or vomiting  Description: INTERVENTIONS:  - Administer IV fluids if ordered to ensure adequate hydration  - Maintain NPO status until nausea and vomiting are resolved  - Nasogastric tube if ordered  - Administer ordered antiemetic medications as needed  - Provide nonpharmacologic comfort measures as appropriate  - Advance diet as tolerated, if ordered  - Consider nutrition services referral to assist patient with adequate nutrition and appropriate food choices  Outcome: Progressing  Goal: Maintains or returns to baseline bowel function  Description: INTERVENTIONS:  - Assess bowel function  - Encourage oral fluids to ensure adequate hydration  - Administer IV fluids if ordered to ensure adequate hydration  - Administer ordered medications as needed  - Encourage mobilization and activity  - Consider nutritional services referral to assist patient with adequate nutrition and appropriate food choices  Outcome: Progressing  Goal: Maintains adequate nutritional intake  Description: INTERVENTIONS:  - Monitor percentage of each meal consumed  - Identify factors contributing to decreased intake, treat as appropriate  - Assist with meals as needed  - Monitor I&O, weight, and lab values if indicated  - Obtain nutrition services referral as needed  Outcome: Progressing     Problem: METABOLIC, FLUID AND ELECTROLYTES - ADULT  Goal: Electrolytes maintained within normal limits  Description: INTERVENTIONS:  - Monitor labs and assess patient for signs and symptoms of electrolyte imbalances  - Administer electrolyte replacement as ordered  - Monitor response to electrolyte replacements, including repeat lab results as appropriate  - Instruct patient on fluid and nutrition as appropriate  Outcome: Progressing  Goal: Fluid balance maintained  Description: INTERVENTIONS:  - Monitor labs   - Monitor I/O and WT  - Instruct patient on fluid and nutrition as appropriate  - Assess for signs & symptoms of volume excess or deficit  Outcome: Progressing     Problem: PAIN - ADULT  Goal: Verbalizes/displays adequate comfort level or baseline comfort level  Description: Interventions:  - Encourage patient to monitor pain and request assistance  - Assess pain using appropriate pain scale  - Administer analgesics based on type and severity of pain and evaluate response  - Implement non-pharmacological measures as appropriate and evaluate response  - Consider cultural and social influences on pain and pain management  - Notify physician/advanced practitioner if interventions unsuccessful or patient reports new pain  Outcome: Progressing     Problem: INFECTION - ADULT  Goal: Absence or prevention of progression during hospitalization  Description: INTERVENTIONS:  - Assess and monitor for signs and symptoms of infection  - Monitor lab/diagnostic results  - Monitor all insertion sites, i e  indwelling lines, tubes, and drains  - Monitor endotracheal if appropriate and nasal secretions for changes in amount and color  - [place] appropriate cooling/warming therapies per order  - Administer medications as ordered  - Instruct and encourage patient and family to use good hand hygiene technique  - Identify and instruct in appropriate isolation precautions for identified infection/condition  Outcome: Progressing     Problem: SAFETY ADULT  Goal: Maintain or return to baseline ADL function  Description: INTERVENTIONS:  -  Assess patient's ability to carry out ADLs; assess patient's baseline for ADL function and identify physical deficits which impact ability to perform ADLs (bathing, care of mouth/teeth, toileting, grooming, dressing, etc )  - Assess/evaluate cause of self-care deficits   - Assess range of motion  - Assess patient's mobility; develop plan if impaired  - Assess patient's need for assistive devices and provide as appropriate  - Encourage maximum independence but intervene and supervise when necessary  - Involve family in performance of ADLs  - Assess for home care needs following discharge   - Consider OT consult to assist with ADL evaluation and planning for discharge  - Provide patient education as appropriate  Outcome: Progressing  Goal: Maintains/Returns to pre admission functional level  Description: INTERVENTIONS:  - Perform BMAT or MOVE assessment daily    - Set and communicate daily mobility goal to care team and patient/family/caregiver  - Collaborate with rehabilitation services on mobility goals if consulted  - Perform Range of Motion 3-4 times a day  - Reposition patient every 2 hours    - Dangle patient 3 times a day  - Stand patient 3 times a day  - Ambulate patient 3 times a day  - Out of bed to chair 3 times a day   - Out of bed for meals 3 times a day  - Out of bed for toileting  - Record patient progress and toleration of activity level   Outcome: Progressing  Goal: Patient will remain free of falls  Description: INTERVENTIONS:  - Educate patient/family on patient safety including physical limitations  - Instruct patient to call for assistance with activity   - Consult OT/PT to assist with strengthening/mobility   - Keep Call bell within reach  - Keep bed low and locked with side rails adjusted as appropriate  - Keep care items and personal belongings within reach  - Initiate and maintain comfort rounds  - Make Fall Risk Sign visible to staff  - Offer Toileting every 2 Hours, in advance of need  - Initiate/Maintain bed/ chair alarm  - Apply yellow socks and bracelet for high fall risk patients  - Consider moving patient to room near nurses station  Outcome: Progressing     Problem: DISCHARGE PLANNING  Goal: Discharge to home or other facility with appropriate resources  Description: INTERVENTIONS:  - Identify barriers to discharge w/patient and caregiver  - Arrange for needed discharge resources and transportation as appropriate  - Identify discharge learning needs (meds, wound care, etc )  - Arrange for interpretive services to assist at discharge as needed  - Refer to Case Management Department for coordinating discharge planning if the patient needs post-hospital services based on physician/advanced practitioner order or complex needs related to functional status, cognitive ability, or social support system  Outcome: Progressing     Problem: Knowledge Deficit  Goal: Patient/family/caregiver demonstrates understanding of disease process, treatment plan, medications, and discharge instructions  Description: Complete learning assessment and assess knowledge base  Interventions:  - Provide teaching at level of understanding  - Provide teaching via preferred learning methods  Outcome: Progressing     Problem: Nutrition/Hydration-ADULT  Goal: Nutrient/Hydration intake appropriate for improving, restoring or maintaining nutritional needs  Description: Monitor and assess patient's nutrition/hydration status for malnutrition  Collaborate with interdisciplinary team and initiate plan and interventions as ordered  Monitor patient's weight and dietary intake as ordered or per policy  Utilize nutrition screening tool and intervene as necessary  Determine patient's food preferences and provide high-protein, high-caloric foods as appropriate       INTERVENTIONS:  - Monitor oral intake, urinary output, labs, and treatment plans  - Assess nutrition and hydration status and recommend course of action  - Evaluate amount of meals eaten  - Assist patient with eating if necessary   - Allow adequate time for meals  - Recommend/ encourage appropriate diets, oral nutritional supplements, and vitamin/mineral supplements  - Order, calculate, and assess calorie counts as needed  - Recommend, monitor, and adjust tube feedings and TPN/PPN based on assessed needs  - Assess need for intravenous fluids  - Provide specific nutrition/hydration education as appropriate  - Include patient/family/caregiver in decisions related to nutrition  Outcome: Progressing

## 2023-06-11 PROCEDURE — 99233 SBSQ HOSP IP/OBS HIGH 50: CPT | Performed by: HOSPITALIST

## 2023-06-11 RX ORDER — DIPHENHYDRAMINE HYDROCHLORIDE 50 MG/ML
50 INJECTION INTRAMUSCULAR; INTRAVENOUS ONCE
Status: COMPLETED | OUTPATIENT
Start: 2023-06-11 | End: 2023-06-11

## 2023-06-11 RX ORDER — PREDNISONE 20 MG/1
40 TABLET ORAL 2 TIMES DAILY WITH MEALS
Status: DISCONTINUED | OUTPATIENT
Start: 2023-06-11 | End: 2023-06-12

## 2023-06-11 RX ADMIN — LEVOTHYROXINE SODIUM 75 MCG: 50 TABLET ORAL at 05:35

## 2023-06-11 RX ADMIN — DIPHENHYDRAMINE HYDROCHLORIDE 50 MG: 50 INJECTION, SOLUTION INTRAMUSCULAR; INTRAVENOUS at 22:12

## 2023-06-11 RX ADMIN — HYDROMORPHONE HYDROCHLORIDE 1 MG: 1 INJECTION, SOLUTION INTRAMUSCULAR; INTRAVENOUS; SUBCUTANEOUS at 15:19

## 2023-06-11 RX ADMIN — DIPHENHYDRAMINE HYDROCHLORIDE 50 MG: 50 INJECTION, SOLUTION INTRAMUSCULAR; INTRAVENOUS at 16:15

## 2023-06-11 RX ADMIN — Medication 100 MG: at 21:02

## 2023-06-11 RX ADMIN — Medication 1200 UNITS: at 21:02

## 2023-06-11 RX ADMIN — LORAZEPAM 1 MG: 2 INJECTION INTRAMUSCULAR; INTRAVENOUS at 16:30

## 2023-06-11 RX ADMIN — LIOTHYRONINE SODIUM 5 MCG: 5 TABLET ORAL at 09:57

## 2023-06-11 RX ADMIN — LORAZEPAM 1 MG: 2 INJECTION INTRAMUSCULAR; INTRAVENOUS at 21:02

## 2023-06-11 RX ADMIN — Medication 1200 UNITS: at 09:42

## 2023-06-11 RX ADMIN — DIPHENHYDRAMINE HYDROCHLORIDE 50 MG: 50 INJECTION, SOLUTION INTRAMUSCULAR; INTRAVENOUS at 05:31

## 2023-06-11 RX ADMIN — SODIUM CHLORIDE 75 ML/HR: 0.9 INJECTION, SOLUTION INTRAVENOUS at 15:53

## 2023-06-11 RX ADMIN — SODIUM CHLORIDE 75 ML/HR: 0.9 INJECTION, SOLUTION INTRAVENOUS at 02:38

## 2023-06-11 RX ADMIN — LORAZEPAM 1 MG: 2 INJECTION INTRAMUSCULAR; INTRAVENOUS at 09:45

## 2023-06-11 RX ADMIN — Medication 100 MG: at 09:44

## 2023-06-11 RX ADMIN — OXYCODONE HYDROCHLORIDE AND ACETAMINOPHEN 500 MG: 500 TABLET ORAL at 09:42

## 2023-06-11 RX ADMIN — HYDROMORPHONE HYDROCHLORIDE 1 MG: 1 INJECTION, SOLUTION INTRAMUSCULAR; INTRAVENOUS; SUBCUTANEOUS at 11:09

## 2023-06-11 RX ADMIN — HYDROMORPHONE HYDROCHLORIDE 1 MG: 1 INJECTION, SOLUTION INTRAMUSCULAR; INTRAVENOUS; SUBCUTANEOUS at 02:34

## 2023-06-11 RX ADMIN — HYDROMORPHONE HYDROCHLORIDE 1 MG: 1 INJECTION, SOLUTION INTRAMUSCULAR; INTRAVENOUS; SUBCUTANEOUS at 06:36

## 2023-06-11 RX ADMIN — PANTOPRAZOLE SODIUM 40 MG: 40 TABLET, DELAYED RELEASE ORAL at 05:34

## 2023-06-11 RX ADMIN — ONDANSETRON 4 MG: 2 INJECTION INTRAMUSCULAR; INTRAVENOUS at 09:45

## 2023-06-11 RX ADMIN — HYDROMORPHONE HYDROCHLORIDE 1 MG: 1 INJECTION, SOLUTION INTRAMUSCULAR; INTRAVENOUS; SUBCUTANEOUS at 19:51

## 2023-06-11 RX ADMIN — ONDANSETRON 4 MG: 2 INJECTION INTRAMUSCULAR; INTRAVENOUS at 02:34

## 2023-06-11 RX ADMIN — ONDANSETRON 4 MG: 2 INJECTION INTRAMUSCULAR; INTRAVENOUS at 19:51

## 2023-06-11 RX ADMIN — PREDNISONE 40 MG: 20 TABLET ORAL at 09:44

## 2023-06-11 RX ADMIN — LORAZEPAM 1 MG: 2 INJECTION INTRAMUSCULAR; INTRAVENOUS at 05:29

## 2023-06-11 RX ADMIN — DIPHENHYDRAMINE HYDROCHLORIDE 50 MG: 50 INJECTION, SOLUTION INTRAMUSCULAR; INTRAVENOUS at 11:05

## 2023-06-11 RX ADMIN — SODIUM CHLORIDE 75 ML/HR: 0.9 INJECTION, SOLUTION INTRAVENOUS at 16:13

## 2023-06-11 NOTE — PLAN OF CARE
Problem: MOBILITY - ADULT  Goal: Maintain or return to baseline ADL function  Description: INTERVENTIONS:  -  Assess patient's ability to carry out ADLs; assess patient's baseline for ADL function and identify physical deficits which impact ability to perform ADLs (bathing, care of mouth/teeth, toileting, grooming, dressing, etc )  - Assess/evaluate cause of self-care deficits   - Assess range of motion  - Assess patient's mobility; develop plan if impaired  - Assess patient's need for assistive devices and provide as appropriate  - Encourage maximum independence but intervene and supervise when necessary  - Involve family in performance of ADLs  - Assess for home care needs following discharge   - Consider OT consult to assist with ADL evaluation and planning for discharge  - Provide patient education as appropriate  Outcome: Progressing  Goal: Maintains/Returns to pre admission functional level  Description: INTERVENTIONS:  - Perform BMAT or MOVE assessment daily    - Set and communicate daily mobility goal to care team and patient/family/caregiver  - Collaborate with rehabilitation services on mobility goals if consulted  - Perform Range of Motion 3-4 times a day  - Reposition patient every 2 hours    - Dangle patient 3 times a day  - Stand patient 3 times a day  - Ambulate patient 3 times a day  - Out of bed to chair 3 times a day   - Out of bed for meals 3 times a day  - Out of bed for toileting  - Record patient progress and toleration of activity level   Outcome: Progressing     Problem: GASTROINTESTINAL - ADULT  Goal: Minimal or absence of nausea and/or vomiting  Description: INTERVENTIONS:  - Administer IV fluids if ordered to ensure adequate hydration  - Maintain NPO status until nausea and vomiting are resolved  - Nasogastric tube if ordered  - Administer ordered antiemetic medications as needed  - Provide nonpharmacologic comfort measures as appropriate  - Advance diet as tolerated, if ordered  - Consider nutrition services referral to assist patient with adequate nutrition and appropriate food choices  Outcome: Progressing  Goal: Maintains or returns to baseline bowel function  Description: INTERVENTIONS:  - Assess bowel function  - Encourage oral fluids to ensure adequate hydration  - Administer IV fluids if ordered to ensure adequate hydration  - Administer ordered medications as needed  - Encourage mobilization and activity  - Consider nutritional services referral to assist patient with adequate nutrition and appropriate food choices  Outcome: Progressing  Goal: Maintains adequate nutritional intake  Description: INTERVENTIONS:  - Monitor percentage of each meal consumed  - Identify factors contributing to decreased intake, treat as appropriate  - Assist with meals as needed  - Monitor I&O, weight, and lab values if indicated  - Obtain nutrition services referral as needed  Outcome: Progressing     Problem: METABOLIC, FLUID AND ELECTROLYTES - ADULT  Goal: Electrolytes maintained within normal limits  Description: INTERVENTIONS:  - Monitor labs and assess patient for signs and symptoms of electrolyte imbalances  - Administer electrolyte replacement as ordered  - Monitor response to electrolyte replacements, including repeat lab results as appropriate  - Instruct patient on fluid and nutrition as appropriate  Outcome: Progressing  Goal: Fluid balance maintained  Description: INTERVENTIONS:  - Monitor labs   - Monitor I/O and WT  - Instruct patient on fluid and nutrition as appropriate  - Assess for signs & symptoms of volume excess or deficit  Outcome: Progressing     Problem: PAIN - ADULT  Goal: Verbalizes/displays adequate comfort level or baseline comfort level  Description: Interventions:  - Encourage patient to monitor pain and request assistance  - Assess pain using appropriate pain scale  - Administer analgesics based on type and severity of pain and evaluate response  - Implement non-pharmacological measures as appropriate and evaluate response  - Consider cultural and social influences on pain and pain management  - Notify physician/advanced practitioner if interventions unsuccessful or patient reports new pain  Outcome: Progressing     Problem: INFECTION - ADULT  Goal: Absence or prevention of progression during hospitalization  Description: INTERVENTIONS:  - Assess and monitor for signs and symptoms of infection  - Monitor lab/diagnostic results  - Monitor all insertion sites, i e  indwelling lines, tubes, and drains  - Monitor endotracheal if appropriate and nasal secretions for changes in amount and color  - [place] appropriate cooling/warming therapies per order  - Administer medications as ordered  - Instruct and encourage patient and family to use good hand hygiene technique  - Identify and instruct in appropriate isolation precautions for identified infection/condition  Outcome: Progressing     Problem: SAFETY ADULT  Goal: Maintain or return to baseline ADL function  Description: INTERVENTIONS:  -  Assess patient's ability to carry out ADLs; assess patient's baseline for ADL function and identify physical deficits which impact ability to perform ADLs (bathing, care of mouth/teeth, toileting, grooming, dressing, etc )  - Assess/evaluate cause of self-care deficits   - Assess range of motion  - Assess patient's mobility; develop plan if impaired  - Assess patient's need for assistive devices and provide as appropriate  - Encourage maximum independence but intervene and supervise when necessary  - Involve family in performance of ADLs  - Assess for home care needs following discharge   - Consider OT consult to assist with ADL evaluation and planning for discharge  - Provide patient education as appropriate  Outcome: Progressing  Goal: Maintains/Returns to pre admission functional level  Description: INTERVENTIONS:  - Perform BMAT or MOVE assessment daily    - Set and communicate daily mobility goal to care team and patient/family/caregiver  - Collaborate with rehabilitation services on mobility goals if consulted  - Perform Range of Motion 3-4 times a day  - Reposition patient every 2 hours    - Dangle patient 3 times a day  - Stand patient 3 times a day  - Ambulate patient 3 times a day  - Out of bed to chair 3 times a day   - Out of bed for meals 3 times a day  - Out of bed for toileting  - Record patient progress and toleration of activity level   Outcome: Progressing  Goal: Patient will remain free of falls  Description: INTERVENTIONS:  - Educate patient/family on patient safety including physical limitations  - Instruct patient to call for assistance with activity   - Consult OT/PT to assist with strengthening/mobility   - Keep Call bell within reach  - Keep bed low and locked with side rails adjusted as appropriate  - Keep care items and personal belongings within reach  - Initiate and maintain comfort rounds  - Make Fall Risk Sign visible to staff  - Offer Toileting every 2 Hours, in advance of need  - Initiate/Maintain bed/ chair alarm  - Apply yellow socks and bracelet for high fall risk patients  - Consider moving patient to room near nurses station  Outcome: Progressing     Problem: DISCHARGE PLANNING  Goal: Discharge to home or other facility with appropriate resources  Description: INTERVENTIONS:  - Identify barriers to discharge w/patient and caregiver  - Arrange for needed discharge resources and transportation as appropriate  - Identify discharge learning needs (meds, wound care, etc )  - Arrange for interpretive services to assist at discharge as needed  - Refer to Case Management Department for coordinating discharge planning if the patient needs post-hospital services based on physician/advanced practitioner order or complex needs related to functional status, cognitive ability, or social support system  Outcome: Progressing     Problem: Knowledge Deficit  Goal: Patient/family/caregiver demonstrates understanding of disease process, treatment plan, medications, and discharge instructions  Description: Complete learning assessment and assess knowledge base  Interventions:  - Provide teaching at level of understanding  - Provide teaching via preferred learning methods  Outcome: Progressing     Problem: Nutrition/Hydration-ADULT  Goal: Nutrient/Hydration intake appropriate for improving, restoring or maintaining nutritional needs  Description: Monitor and assess patient's nutrition/hydration status for malnutrition  Collaborate with interdisciplinary team and initiate plan and interventions as ordered  Monitor patient's weight and dietary intake as ordered or per policy  Utilize nutrition screening tool and intervene as necessary  Determine patient's food preferences and provide high-protein, high-caloric foods as appropriate       INTERVENTIONS:  - Monitor oral intake, urinary output, labs, and treatment plans  - Assess nutrition and hydration status and recommend course of action  - Evaluate amount of meals eaten  - Assist patient with eating if necessary   - Allow adequate time for meals  - Recommend/ encourage appropriate diets, oral nutritional supplements, and vitamin/mineral supplements  - Order, calculate, and assess calorie counts as needed  - Recommend, monitor, and adjust tube feedings and TPN/PPN based on assessed needs  - Assess need for intravenous fluids  - Provide specific nutrition/hydration education as appropriate  - Include patient/family/caregiver in decisions related to nutrition  Outcome: Progressing

## 2023-06-11 NOTE — PLAN OF CARE
Problem: MOBILITY - ADULT  Goal: Maintain or return to baseline ADL function  Description: INTERVENTIONS:  -  Assess patient's ability to carry out ADLs; assess patient's baseline for ADL function and identify physical deficits which impact ability to perform ADLs (bathing, care of mouth/teeth, toileting, grooming, dressing, etc )  - Assess/evaluate cause of self-care deficits   - Assess range of motion  - Assess patient's mobility; develop plan if impaired  - Assess patient's need for assistive devices and provide as appropriate  - Encourage maximum independence but intervene and supervise when necessary  - Involve family in performance of ADLs  - Assess for home care needs following discharge   - Consider OT consult to assist with ADL evaluation and planning for discharge  - Provide patient education as appropriate  Outcome: Progressing  Goal: Maintains/Returns to pre admission functional level  Description: INTERVENTIONS:  - Perform BMAT or MOVE assessment daily    - Set and communicate daily mobility goal to care team and patient/family/caregiver  - Collaborate with rehabilitation services on mobility goals if consulted  - Perform Range of Motion 3-4 times a day  - Reposition patient every 2 hours    - Dangle patient 3 times a day  - Stand patient 3 times a day  - Ambulate patient 3 times a day  - Out of bed to chair 3 times a day   - Out of bed for meals 3 times a day  - Out of bed for toileting  - Record patient progress and toleration of activity level   Outcome: Progressing     Problem: GASTROINTESTINAL - ADULT  Goal: Minimal or absence of nausea and/or vomiting  Description: INTERVENTIONS:  - Administer IV fluids if ordered to ensure adequate hydration  - Maintain NPO status until nausea and vomiting are resolved  - Nasogastric tube if ordered  - Administer ordered antiemetic medications as needed  - Provide nonpharmacologic comfort measures as appropriate  - Advance diet as tolerated, if ordered  - Consider nutrition services referral to assist patient with adequate nutrition and appropriate food choices  Outcome: Progressing  Goal: Maintains or returns to baseline bowel function  Description: INTERVENTIONS:  - Assess bowel function  - Encourage oral fluids to ensure adequate hydration  - Administer IV fluids if ordered to ensure adequate hydration  - Administer ordered medications as needed  - Encourage mobilization and activity  - Consider nutritional services referral to assist patient with adequate nutrition and appropriate food choices  Outcome: Progressing  Goal: Maintains adequate nutritional intake  Description: INTERVENTIONS:  - Monitor percentage of each meal consumed  - Identify factors contributing to decreased intake, treat as appropriate  - Assist with meals as needed  - Monitor I&O, weight, and lab values if indicated  - Obtain nutrition services referral as needed  Outcome: Progressing     Problem: METABOLIC, FLUID AND ELECTROLYTES - ADULT  Goal: Electrolytes maintained within normal limits  Description: INTERVENTIONS:  - Monitor labs and assess patient for signs and symptoms of electrolyte imbalances  - Administer electrolyte replacement as ordered  - Monitor response to electrolyte replacements, including repeat lab results as appropriate  - Instruct patient on fluid and nutrition as appropriate  Outcome: Progressing  Goal: Fluid balance maintained  Description: INTERVENTIONS:  - Monitor labs   - Monitor I/O and WT  - Instruct patient on fluid and nutrition as appropriate  - Assess for signs & symptoms of volume excess or deficit  Outcome: Progressing     Problem: PAIN - ADULT  Goal: Verbalizes/displays adequate comfort level or baseline comfort level  Description: Interventions:  - Encourage patient to monitor pain and request assistance  - Assess pain using appropriate pain scale  - Administer analgesics based on type and severity of pain and evaluate response  - Implement non-pharmacological measures as appropriate and evaluate response  - Consider cultural and social influences on pain and pain management  - Notify physician/advanced practitioner if interventions unsuccessful or patient reports new pain  Outcome: Progressing     Problem: INFECTION - ADULT  Goal: Absence or prevention of progression during hospitalization  Description: INTERVENTIONS:  - Assess and monitor for signs and symptoms of infection  - Monitor lab/diagnostic results  - Monitor all insertion sites, i e  indwelling lines, tubes, and drains  - Monitor endotracheal if appropriate and nasal secretions for changes in amount and color  - [place] appropriate cooling/warming therapies per order  - Administer medications as ordered  - Instruct and encourage patient and family to use good hand hygiene technique  - Identify and instruct in appropriate isolation precautions for identified infection/condition  Outcome: Progressing     Problem: SAFETY ADULT  Goal: Maintain or return to baseline ADL function  Description: INTERVENTIONS:  -  Assess patient's ability to carry out ADLs; assess patient's baseline for ADL function and identify physical deficits which impact ability to perform ADLs (bathing, care of mouth/teeth, toileting, grooming, dressing, etc )  - Assess/evaluate cause of self-care deficits   - Assess range of motion  - Assess patient's mobility; develop plan if impaired  - Assess patient's need for assistive devices and provide as appropriate  - Encourage maximum independence but intervene and supervise when necessary  - Involve family in performance of ADLs  - Assess for home care needs following discharge   - Consider OT consult to assist with ADL evaluation and planning for discharge  - Provide patient education as appropriate  Outcome: Progressing  Goal: Maintains/Returns to pre admission functional level  Description: INTERVENTIONS:  - Perform BMAT or MOVE assessment daily    - Set and communicate daily mobility goal to care team and patient/family/caregiver  - Collaborate with rehabilitation services on mobility goals if consulted  - Perform Range of Motion 3-4 times a day  - Reposition patient every 2 hours    - Dangle patient 3 times a day  - Stand patient 3 times a day  - Ambulate patient 3 times a day  - Out of bed to chair 3 times a day   - Out of bed for meals 3 times a day  - Out of bed for toileting  - Record patient progress and toleration of activity level   Outcome: Progressing  Goal: Patient will remain free of falls  Description: INTERVENTIONS:  - Educate patient/family on patient safety including physical limitations  - Instruct patient to call for assistance with activity   - Consult OT/PT to assist with strengthening/mobility   - Keep Call bell within reach  - Keep bed low and locked with side rails adjusted as appropriate  - Keep care items and personal belongings within reach  - Initiate and maintain comfort rounds  - Make Fall Risk Sign visible to staff  - Offer Toileting every 2 Hours, in advance of need  - Initiate/Maintain bed/ chair alarm  - Apply yellow socks and bracelet for high fall risk patients  - Consider moving patient to room near nurses station  Outcome: Progressing     Problem: DISCHARGE PLANNING  Goal: Discharge to home or other facility with appropriate resources  Description: INTERVENTIONS:  - Identify barriers to discharge w/patient and caregiver  - Arrange for needed discharge resources and transportation as appropriate  - Identify discharge learning needs (meds, wound care, etc )  - Arrange for interpretive services to assist at discharge as needed  - Refer to Case Management Department for coordinating discharge planning if the patient needs post-hospital services based on physician/advanced practitioner order or complex needs related to functional status, cognitive ability, or social support system  Outcome: Progressing     Problem: Knowledge Deficit  Goal: Patient/family/caregiver demonstrates understanding of disease process, treatment plan, medications, and discharge instructions  Description: Complete learning assessment and assess knowledge base  Interventions:  - Provide teaching at level of understanding  - Provide teaching via preferred learning methods  Outcome: Progressing     Problem: Nutrition/Hydration-ADULT  Goal: Nutrient/Hydration intake appropriate for improving, restoring or maintaining nutritional needs  Description: Monitor and assess patient's nutrition/hydration status for malnutrition  Collaborate with interdisciplinary team and initiate plan and interventions as ordered  Monitor patient's weight and dietary intake as ordered or per policy  Utilize nutrition screening tool and intervene as necessary  Determine patient's food preferences and provide high-protein, high-caloric foods as appropriate       INTERVENTIONS:  - Monitor oral intake, urinary output, labs, and treatment plans  - Assess nutrition and hydration status and recommend course of action  - Evaluate amount of meals eaten  - Assist patient with eating if necessary   - Allow adequate time for meals  - Recommend/ encourage appropriate diets, oral nutritional supplements, and vitamin/mineral supplements  - Order, calculate, and assess calorie counts as needed  - Recommend, monitor, and adjust tube feedings and TPN/PPN based on assessed needs  - Assess need for intravenous fluids  - Provide specific nutrition/hydration education as appropriate  - Include patient/family/caregiver in decisions related to nutrition  Outcome: Progressing

## 2023-06-11 NOTE — PROGRESS NOTES
[address] [facility] 160[facility]  Progress Note  Name: Mary Lou Diaz  MRN: [id number]  Unit/Bed#: 650-81 I Date of Admission: 5/29/2023   Date of Service: 6/11/2023 I Hospital Day: 13    Assessment/Plan   * Crohn's colitis [facility])  Assessment & Plan  · Stable, no bloody diarrhea  · On Stelara and omalizumab infusion treatment as outpatient  · Patient missed all infusions past week prior to admission  · She reports taking budesonide 3 mg 3 times daily at home previously but now states she was most recently taking prednisone, unclear what dose but seems like most recent was 40 mg twice daily  · GI consulted, appreciate  · She completed prior IV course for GI  · She can resume PO steroids on discharge   · Continues to have some abdominal pain, had been variable for days  · 6/11: patient c/o significant abdominal pain 10/10, worsened overnight      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[facility] [address] )  Assessment & Plan  · Follows with mast cell activation specialist at the [facility]  · Continue Benadryl 50 mg IM twice daily, Protonix 40 mg twice daily, pepcid twice daily, and steroids --> now s/p clint-operative prep and post need for IV medications  · Switched from IV formulations to PO (her home meds which she tolerates at home)  · Monitor symptoms  · Continue IVF    Bladder tumor  Assessment & Plan  · Patient had been having prior urinary issues before admission, planned for outpatient cystoscopy  · Patient reported bright red blood in urine 6/3, then reported pink tinged and she is having some symptoms of frequency and straining with urination  · Patient was scheduled for outpatient procedure, for which she typically needs significant prep 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  · Her urologist was contacted, see chart, plan for while inpatient as she missed her appointment and given her needs for prep/monitoring  · S/p cystoscopy 6/8, which found likely bladder tumor consistent with TCC, underwent fulguration and biopsy  · Urology to follow biopsy and further management    Seizure [facility])  Assessment & Plan  · Ativan as needed  · Not on any other seizure medications at home  · EEG completed  Patient had one of her typical events  No epileptic activity associated with the event  Consistent with psychogenic seizures, though seizures can be result of Mast cell disease  · No evidence further seizures   · Recommend outpatient therapy    Hypothyroidism (acquired)  Assessment & Plan  · TSH WNL 1 889  · Continue levothyroxine 100 mcg daily    SIRS (systemic inflammatory response syndrome) (HCC)  Assessment & Plan  SIRS, resolved, possibly due to Crohn's disease/Viral gastroenteritis; presented with WBC 13 25 and   · Resolved SIRS, see above            Vomiting and diarrhea  Assessment & Plan  Presented to ED with ongoing nonbloody nonbilious emesis and nonbloody diarrhea since EGD colonoscopy on 5/2/2023  · Stool C  difficile and PCR negative  · Met SIRS criteria on admission likely due to Crohn's vs viral gastroenteritis and resovled  · Symptoms improved/resolved                 Patient Centered Rounds: I performed bedside rounds with nursing staff today  Total Time Spent on Date of Encounter in care of patient: 45 minutes This time was spent on one or more of the following: performing physical exam; counseling and coordination of care; obtaining or reviewing history; documenting in the medical record; reviewing/ordering tests, medications or procedures; communicating with other healthcare professionals and discussing with patient's family/caregivers      Current Length of Stay: 13 day(s)  Current Patient Status: Inpatient   Certification Statement: pending patients symptom improvement    Code Status: Level 1 - Full Code    Subjective:   Patient reports worsened abd pain overnight, now on right and left sides, changed from prior  Still concerned about blood in stool, no overt BRBPR seen by nursing    Objective:     Vitals:   Temp (24hrs), Av 4 °F (36 9 °C), Min:97 7 °F (36 5 °C), Max:98 7 °F (37 1 °C)    Temp:  [97 7 °F (36 5 °C)-98 7 °F (37 1 °C)] 98 6 °F (37 °C)  HR:  [] 98  Resp:  [17-18] 18  BP: (105-136)/(66-90) 134/90  SpO2:  [94 %-98 %] 94 %  Body mass index is 25 86 kg/m²  Input and Output Summary (last 24 hours): Intake/Output Summary (Last 24 hours) at 2023 1513  Last data filed at 2023 0900  Gross per 24 hour   Intake 240 ml   Output 600 ml   Net -360 ml       Physical Exam:   Physical Exam  Vitals and nursing note reviewed  Constitutional:       General: She is not in acute distress  Appearance: She is well-developed  HENT:      Head: Normocephalic and atraumatic  Eyes:      Conjunctiva/sclera: Conjunctivae normal    Cardiovascular:      Rate and Rhythm: Normal rate and regular rhythm  Heart sounds: No murmur heard  Pulmonary:      Effort: Pulmonary effort is normal  No respiratory distress  Breath sounds: Normal breath sounds  Abdominal:      Palpations: Abdomen is soft  Tenderness: There is no abdominal tenderness  Comments: Tenderness over the left abdomen   Musculoskeletal:         General: No swelling  Cervical back: Neck supple  Skin:     General: Skin is warm and dry  Capillary Refill: Capillary refill takes less than 2 seconds  Neurological:      Mental Status: She is alert     Psychiatric:         Mood and Affect: Mood normal           Additional Data:     Labs:  Results from last 7 days   Lab Units 23  0553   HEMATOCRIT % 39 7   HEMOGLOBIN g/dL 13 0   PLATELETS Thousands/uL 514*   WBC Thousand/uL 21 84*     Results from last 7 days   Lab Units 23  0553   ANION GAP mmol/L 8   ALBUMIN g/dL 3 2*   ALK PHOS U/L 59   ALT U/L 38   AST U/L 12*   BUN mg/dL 20   CALCIUM mg/dL 7 9*   CHLORIDE mmol/L 102   CO2 mmol/L 26   CREATININE mg/dL 0 69   GLUCOSE RANDOM mg/dL 132   POTASSIUM mmol/L 3 7   SODIUM mmol/L 136   TOTAL BILIRUBIN mg/dL 0 23 Results from last 7 days   Lab Units 06/10/23  0753   POC GLUCOSE mg/dl 100               Lines/Drains:  Invasive Devices     Peripheral Intravenous Line  Duration           Peripheral IV 06/11/23 Left;Ventral (anterior) Wrist <1 day                      Imaging: No pertinent imaging reviewed  Recent Cultures (last 7 days):         Last 24 Hours Medication List:   Current Facility-Administered Medications   Medication Dose Route Frequency Provider Last Rate   • acetaminophen  650 mg Oral Q6H PRN Finn Torres MD     • albuterol  2 puff Inhalation Q4H PRN Finn Torres MD     • ascorbic acid  500 mg Oral BID With Meals Pro Chirag Counts, DO     • butalbital-acetaminophen-caffeine  1 tablet Oral Q8H PRN Finn Torres MD     • cholecalciferol  1,200 Units Oral BID Pro Chirag Counts, DO     • diphenhydrAMINE  50 mg Intramuscular Q6H Finn Torres MD     • diphenhydrAMINE  50 mg Intravenous Q6H PRN Finn Torres MD     • famotidine  40 mg Oral Daily With [employer] Counts, DO     • HYDROmorphone  1 mg Intravenous Q4H PRN Finn Torres MD     • levothyroxine  75 mcg Oral Early Morning Pro Chirag Counts, DO     • liothyronine  5 mcg Oral Daily Pro Chirag Counts, DO     • LORazepam  1 mg Intravenous Q4H PRN Finn Torres MD     • ondansetron  4 mg Intravenous Q6H PRN Finn Torres MD     • pantoprazole  40 mg Oral Early Morning Pro Chirag Counts, DO     • potassium-sodium phosphateS  1 tablet Oral TID With Meals Finn Torres MD     • predniSONE  20 mg Oral Daily PRN Finn Torres MD     • predniSONE  40 mg Oral BID With Meals Pro Chirag Counts, DO     • Riboflavin  100 mg Oral BID Pro Chirag Counts, DO     • sodium chloride  75 mL/hr Intravenous Continuous Finn Torres MD 75 mL/hr (06/11/23 7636)        Today, Patient Was Seen By: Nancy Dominguez, DO    **Please Note: This note may have been constructed using a voice recognition system  **

## 2023-06-12 ENCOUNTER — HOSPITAL ENCOUNTER (OUTPATIENT)
Dept: INFUSION CENTER | Facility: HOSPITAL | Age: 48
Discharge: HOME | End: 2023-06-12
Attending: INTERNAL MEDICINE

## 2023-06-12 ENCOUNTER — TELEPHONE (OUTPATIENT)
Dept: UROLOGY | Facility: MEDICAL CENTER | Age: 48
End: 2023-06-12

## 2023-06-12 LAB
ALBUMIN SERPL BCP-MCNC: 3.2 G/DL (ref 3.5–5)
ALP SERPL-CCNC: 52 U/L (ref 34–104)
ALT SERPL W P-5'-P-CCNC: 43 U/L (ref 7–52)
ANION GAP SERPL CALCULATED.3IONS-SCNC: 11 MMOL/L (ref 4–13)
AST SERPL W P-5'-P-CCNC: 17 U/L (ref 13–39)
BASOPHILS # BLD AUTO: 0.01 THOUSANDS/ÂΜL (ref 0–0.1)
BASOPHILS NFR BLD AUTO: 0 % (ref 0–1)
BILIRUB SERPL-MCNC: 0.33 MG/DL (ref 0.2–1)
BUN SERPL-MCNC: 14 MG/DL (ref 5–25)
CALCIUM ALBUM COR SERPL-MCNC: 9.1 MG/DL (ref 8.3–10.1)
CALCIUM SERPL-MCNC: 8.5 MG/DL (ref 8.4–10.2)
CHLORIDE SERPL-SCNC: 98 MMOL/L (ref 96–108)
CO2 SERPL-SCNC: 28 MMOL/L (ref 21–32)
CREAT SERPL-MCNC: 0.74 MG/DL (ref 0.6–1.3)
EOSINOPHIL # BLD AUTO: 0.14 THOUSAND/ÂΜL (ref 0–0.61)
EOSINOPHIL NFR BLD AUTO: 1 % (ref 0–6)
ERYTHROCYTE [DISTWIDTH] IN BLOOD BY AUTOMATED COUNT: 15.3 % (ref 11.6–15.1)
GFR SERPL CREATININE-BSD FRML MDRD: 94 ML/MIN/1.73SQ M
GLUCOSE SERPL-MCNC: 161 MG/DL (ref 65–140)
HCT VFR BLD AUTO: 41.6 % (ref 34.8–46.1)
HGB BLD-MCNC: 13.6 G/DL (ref 11.5–15.4)
IMM GRANULOCYTES # BLD AUTO: 0.13 THOUSAND/UL (ref 0–0.2)
IMM GRANULOCYTES NFR BLD AUTO: 1 % (ref 0–2)
LYMPHOCYTES # BLD AUTO: 2.98 THOUSANDS/ÂΜL (ref 0.6–4.47)
LYMPHOCYTES NFR BLD AUTO: 19 % (ref 14–44)
MAGNESIUM SERPL-MCNC: 2 MG/DL (ref 1.9–2.7)
MCH RBC QN AUTO: 30.6 PG (ref 26.8–34.3)
MCHC RBC AUTO-ENTMCNC: 32.7 G/DL (ref 31.4–37.4)
MCV RBC AUTO: 94 FL (ref 82–98)
MONOCYTES # BLD AUTO: 1.27 THOUSAND/ÂΜL (ref 0.17–1.22)
MONOCYTES NFR BLD AUTO: 8 % (ref 4–12)
NEUTROPHILS # BLD AUTO: 11.08 THOUSANDS/ÂΜL (ref 1.85–7.62)
NEUTS SEG NFR BLD AUTO: 71 % (ref 43–75)
NRBC BLD AUTO-RTO: 0 /100 WBCS
PHOSPHATE SERPL-MCNC: 3.9 MG/DL (ref 2.7–4.5)
PLATELET # BLD AUTO: 464 THOUSANDS/UL (ref 149–390)
PMV BLD AUTO: 8.7 FL (ref 8.9–12.7)
POTASSIUM SERPL-SCNC: 3.6 MMOL/L (ref 3.5–5.3)
PROT SERPL-MCNC: 5.6 G/DL (ref 6.4–8.4)
RBC # BLD AUTO: 4.45 MILLION/UL (ref 3.81–5.12)
SODIUM SERPL-SCNC: 137 MMOL/L (ref 135–147)
WBC # BLD AUTO: 15.61 THOUSAND/UL (ref 4.31–10.16)

## 2023-06-12 PROCEDURE — 85025 COMPLETE CBC W/AUTO DIFF WBC: CPT | Performed by: INTERNAL MEDICINE

## 2023-06-12 PROCEDURE — 83735 ASSAY OF MAGNESIUM: CPT | Performed by: INTERNAL MEDICINE

## 2023-06-12 PROCEDURE — 84100 ASSAY OF PHOSPHORUS: CPT | Performed by: INTERNAL MEDICINE

## 2023-06-12 PROCEDURE — 80053 COMPREHEN METABOLIC PANEL: CPT | Performed by: INTERNAL MEDICINE

## 2023-06-12 PROCEDURE — 99232 SBSQ HOSP IP/OBS MODERATE 35: CPT | Performed by: INTERNAL MEDICINE

## 2023-06-12 RX ORDER — HYDROMORPHONE HCL/PF 1 MG/ML
1 SYRINGE (ML) INJECTION EVERY 6 HOURS PRN
Status: DISCONTINUED | OUTPATIENT
Start: 2023-06-12 | End: 2023-06-13 | Stop reason: HOSPADM

## 2023-06-12 RX ORDER — PREDNISONE 20 MG/1
20 TABLET ORAL 3 TIMES DAILY
Status: DISCONTINUED | OUTPATIENT
Start: 2023-06-12 | End: 2023-06-13 | Stop reason: HOSPADM

## 2023-06-12 RX ADMIN — LIOTHYRONINE SODIUM 5 MCG: 5 TABLET ORAL at 11:36

## 2023-06-12 RX ADMIN — DIPHENHYDRAMINE HYDROCHLORIDE 50 MG: 50 INJECTION, SOLUTION INTRAMUSCULAR; INTRAVENOUS at 04:09

## 2023-06-12 RX ADMIN — PREDNISONE 20 MG: 20 TABLET ORAL at 15:49

## 2023-06-12 RX ADMIN — PREDNISONE 20 MG: 20 TABLET ORAL at 11:38

## 2023-06-12 RX ADMIN — OXYCODONE HYDROCHLORIDE AND ACETAMINOPHEN 500 MG: 500 TABLET ORAL at 11:36

## 2023-06-12 RX ADMIN — LORAZEPAM 1 MG: 2 INJECTION INTRAMUSCULAR; INTRAVENOUS at 09:12

## 2023-06-12 RX ADMIN — LORAZEPAM 1 MG: 2 INJECTION INTRAMUSCULAR; INTRAVENOUS at 04:09

## 2023-06-12 RX ADMIN — LEVOTHYROXINE SODIUM 75 MCG: 50 TABLET ORAL at 05:39

## 2023-06-12 RX ADMIN — HYDROMORPHONE HYDROCHLORIDE 1 MG: 1 INJECTION, SOLUTION INTRAMUSCULAR; INTRAVENOUS; SUBCUTANEOUS at 05:39

## 2023-06-12 RX ADMIN — DIPHENHYDRAMINE HYDROCHLORIDE 50 MG: 50 INJECTION, SOLUTION INTRAMUSCULAR; INTRAVENOUS at 21:43

## 2023-06-12 RX ADMIN — FAMOTIDINE 40 MG: 40 TABLET, FILM COATED ORAL at 15:49

## 2023-06-12 RX ADMIN — Medication 1200 UNITS: at 11:38

## 2023-06-12 RX ADMIN — ONDANSETRON 4 MG: 2 INJECTION INTRAMUSCULAR; INTRAVENOUS at 15:56

## 2023-06-12 RX ADMIN — ONDANSETRON 4 MG: 2 INJECTION INTRAMUSCULAR; INTRAVENOUS at 09:13

## 2023-06-12 RX ADMIN — LORAZEPAM 1 MG: 2 INJECTION INTRAMUSCULAR; INTRAVENOUS at 21:43

## 2023-06-12 RX ADMIN — DIPHENHYDRAMINE HYDROCHLORIDE 50 MG: 50 INJECTION, SOLUTION INTRAMUSCULAR; INTRAVENOUS at 15:49

## 2023-06-12 RX ADMIN — Medication 100 MG: at 11:37

## 2023-06-12 RX ADMIN — HYDROMORPHONE HYDROCHLORIDE 1 MG: 1 INJECTION, SOLUTION INTRAMUSCULAR; INTRAVENOUS; SUBCUTANEOUS at 00:48

## 2023-06-12 RX ADMIN — ONDANSETRON 4 MG: 2 INJECTION INTRAMUSCULAR; INTRAVENOUS at 00:48

## 2023-06-12 RX ADMIN — ONDANSETRON 4 MG: 2 INJECTION INTRAMUSCULAR; INTRAVENOUS at 21:43

## 2023-06-12 RX ADMIN — HYDROMORPHONE HYDROCHLORIDE 1 MG: 1 INJECTION, SOLUTION INTRAMUSCULAR; INTRAVENOUS; SUBCUTANEOUS at 11:36

## 2023-06-12 RX ADMIN — Medication 1200 UNITS: at 21:43

## 2023-06-12 RX ADMIN — LORAZEPAM 1 MG: 2 INJECTION INTRAMUSCULAR; INTRAVENOUS at 15:48

## 2023-06-12 RX ADMIN — HYDROMORPHONE HYDROCHLORIDE 1 MG: 1 INJECTION, SOLUTION INTRAMUSCULAR; INTRAVENOUS; SUBCUTANEOUS at 18:16

## 2023-06-12 RX ADMIN — SODIUM CHLORIDE 75 ML/HR: 0.9 INJECTION, SOLUTION INTRAVENOUS at 17:00

## 2023-06-12 RX ADMIN — DIPHENHYDRAMINE HYDROCHLORIDE 50 MG: 50 INJECTION, SOLUTION INTRAMUSCULAR; INTRAVENOUS at 10:04

## 2023-06-12 RX ADMIN — PREDNISONE 20 MG: 20 TABLET ORAL at 21:44

## 2023-06-12 RX ADMIN — Medication 100 MG: at 21:43

## 2023-06-12 RX ADMIN — OXYCODONE HYDROCHLORIDE AND ACETAMINOPHEN 500 MG: 500 TABLET ORAL at 17:00

## 2023-06-12 RX ADMIN — PANTOPRAZOLE SODIUM 40 MG: 40 TABLET, DELAYED RELEASE ORAL at 05:39

## 2023-06-12 NOTE — TELEPHONE ENCOUNTER
----- Message from Ashtyn Rucker MD sent at 6/8/2023  8:17 AM EDT -----  Patient will need cystoscopy in 3 months  I removed a small superficial bladder tumor today in the operating room  Await the pathology  Please call patient to arrange  She is currently inpatient at [address] [facility]

## 2023-06-12 NOTE — PLAN OF CARE
Problem: MOBILITY - ADULT  Goal: Maintain or return to baseline ADL function  Description: INTERVENTIONS:  -  Assess patient's ability to carry out ADLs; assess patient's baseline for ADL function and identify physical deficits which impact ability to perform ADLs (bathing, care of mouth/teeth, toileting, grooming, dressing, etc )  - Assess/evaluate cause of self-care deficits   - Assess range of motion  - Assess patient's mobility; develop plan if impaired  - Assess patient's need for assistive devices and provide as appropriate  - Encourage maximum independence but intervene and supervise when necessary  - Involve family in performance of ADLs  - Assess for home care needs following discharge   - Consider OT consult to assist with ADL evaluation and planning for discharge  - Provide patient education as appropriate  Outcome: Progressing  Goal: Maintains/Returns to pre admission functional level  Description: INTERVENTIONS:  - Perform BMAT or MOVE assessment daily    - Set and communicate daily mobility goal to care team and patient/family/caregiver  - Collaborate with rehabilitation services on mobility goals if consulted  - Perform Range of Motion 3-4 times a day  - Reposition patient every 2 hours    - Dangle patient 3 times a day  - Stand patient 3 times a day  - Ambulate patient 3 times a day  - Out of bed to chair 3 times a day   - Out of bed for meals 3 times a day  - Out of bed for toileting  - Record patient progress and toleration of activity level   Outcome: Progressing     Problem: GASTROINTESTINAL - ADULT  Goal: Minimal or absence of nausea and/or vomiting  Description: INTERVENTIONS:  - Administer IV fluids if ordered to ensure adequate hydration1  - Maintain NPO status until nausea and vomiting are resolved  - Nasogastric tube if ordered  - Administer ordered antiemetic medications as needed  - Provide nonpharmacologic comfort measures as appropriate  - Advance diet as tolerated, if ordered  - Consider nutrition services referral to assist patient with adequate nutrition and appropriate food choices  Outcome: Progressing  Goal: Maintains or returns to baseline bowel function  Description: INTERVENTIONS:  - Assess bowel function  - Encourage oral fluids to ensure adequate hydration  - Administer IV fluids if ordered to ensure adequate hydration  - Administer ordered medications as needed  - Encourage mobilization and activity  - Consider nutritional services referral to assist patient with adequate nutrition and appropriate food choices  Outcome: Progressing  Goal: Maintains adequate nutritional intake  Description: INTERVENTIONS:  - Monitor percentage of each meal consumed  - Identify factors contributing to decreased intake, treat as appropriate  - Assist with meals as needed  - Monitor I&O, weight, and lab values if indicated  - Obtain nutrition services referral as needed  Outcome: Progressing     Problem: METABOLIC, FLUID AND ELECTROLYTES - ADULT  Goal: Electrolytes maintained within normal limits  Description: INTERVENTIONS:  - Monitor labs and assess patient for signs and symptoms of electrolyte imbalances  - Administer electrolyte replacement as ordered  - Monitor response to electrolyte replacements, including repeat lab results as appropriate  - Instruct patient on fluid and nutrition as appropriate  Outcome: Progressing  Goal: Fluid balance maintained  Description: INTERVENTIONS:  - Monitor labs   - Monitor I/O and WT  - Instruct patient on fluid and nutrition as appropriate  - Assess for signs & symptoms of volume excess or deficit  Outcome: Progressing     Problem: PAIN - ADULT  Goal: Verbalizes/displays adequate comfort level or baseline comfort level  Description: Interventions:  - Encourage patient to monitor pain and request assistance  - Assess pain using appropriate pain scale  - Administer analgesics based on type and severity of pain and evaluate response  - Implement non-pharmacological measures as appropriate and evaluate response  - Consider cultural and social influences on pain and pain management  - Notify physician/advanced practitioner if interventions unsuccessful or patient reports new pain  Outcome: Progressing     Problem: INFECTION - ADULT  Goal: Absence or prevention of progression during hospitalization  Description: INTERVENTIONS:  - Assess and monitor for signs and symptoms of infection  - Monitor lab/diagnostic results  - Monitor all insertion sites, i e  indwelling lines, tubes, and drains  - Monitor endotracheal if appropriate and nasal secretions for changes in amount and color  - [place] appropriate cooling/warming therapies per order  - Administer medications as ordered  - Instruct and encourage patient and family to use good hand hygiene technique  - Identify and instruct in appropriate isolation precautions for identified infection/condition  Outcome: Progressing     Problem: SAFETY ADULT  Goal: Maintain or return to baseline ADL function  Description: INTERVENTIONS:  -  Assess patient's ability to carry out ADLs; assess patient's baseline for ADL function and identify physical deficits which impact ability to perform ADLs (bathing, care of mouth/teeth, toileting, grooming, dressing, etc )  - Assess/evaluate cause of self-care deficits   - Assess range of motion  - Assess patient's mobility; develop plan if impaired  - Assess patient's need for assistive devices and provide as appropriate  - Encourage maximum independence but intervene and supervise when necessary  - Involve family in performance of ADLs  - Assess for home care needs following discharge   - Consider OT consult to assist with ADL evaluation and planning for discharge  - Provide patient education as appropriate  Outcome: Progressing  Goal: Maintains/Returns to pre admission functional level  Description: INTERVENTIONS:  - Perform BMAT or MOVE assessment daily    - Set and communicate daily mobility goal to care team and patient/family/caregiver  - Collaborate with rehabilitation services on mobility goals if consulted  - Perform Range of Motion 3-4 times a day  - Reposition patient every 2 hours    - Dangle patient 3 times a day  - Stand patient 3 times a day  - Ambulate patient 3 times a day  - Out of bed to chair 3 times a day   - Out of bed for meals 3 times a day  - Out of bed for toileting  - Record patient progress and toleration of activity level   Outcome: Progressing  Goal: Patient will remain free of falls  Description: INTERVENTIONS:  - Educate patient/family on patient safety including physical limitations  - Instruct patient to call for assistance with activity   - Consult OT/PT to assist with strengthening/mobility   - Keep Call bell within reach  - Keep bed low and locked with side rails adjusted as appropriate  - Keep care items and personal belongings within reach  - Initiate and maintain comfort rounds  - Make Fall Risk Sign visible to staff  - Offer Toileting every 2 Hours, in advance of need  - Initiate/Maintain bed/ chair alarm  - Apply yellow socks and bracelet for high fall risk patients  - Consider moving patient to room near nurses station  Outcome: Progressing     Problem: DISCHARGE PLANNING  Goal: Discharge to home or other facility with appropriate resources  Description: INTERVENTIONS:  - Identify barriers to discharge w/patient and caregiver  - Arrange for needed discharge resources and transportation as appropriate  - Identify discharge learning needs (meds, wound care, etc )  - Arrange for interpretive services to assist at discharge as needed  - Refer to Case Management Department for coordinating discharge planning if the patient needs post-hospital services based on physician/advanced practitioner order or complex needs related to functional status, cognitive ability, or social support system  Outcome: Progressing     Problem: Knowledge Deficit  Goal: Patient/family/caregiver demonstrates understanding of disease process, treatment plan, medications, and discharge instructions  Description: Complete learning assessment and assess knowledge base  Interventions:  - Provide teaching at level of understanding  - Provide teaching via preferred learning methods  Outcome: Progressing     Problem: Nutrition/Hydration-ADULT  Goal: Nutrient/Hydration intake appropriate for improving, restoring or maintaining nutritional needs  Description: Monitor and assess patient's nutrition/hydration status for malnutrition  Collaborate with interdisciplinary team and initiate plan and interventions as ordered  Monitor patient's weight and dietary intake as ordered or per policy  Utilize nutrition screening tool and intervene as necessary  Determine patient's food preferences and provide high-protein, high-caloric foods as appropriate       INTERVENTIONS:  - Monitor oral intake, urinary output, labs, and treatment plans  - Assess nutrition and hydration status and recommend course of action  - Evaluate amount of meals eaten  - Assist patient with eating if necessary   - Allow adequate time for meals  - Recommend/ encourage appropriate diets, oral nutritional supplements, and vitamin/mineral supplements  - Order, calculate, and assess calorie counts as needed  - Recommend, monitor, and adjust tube feedings and TPN/PPN based on assessed needs  - Assess need for intravenous fluids  - Provide specific nutrition/hydration education as appropriate  - Include patient/family/caregiver in decisions related to nutrition  Outcome: Progressing

## 2023-06-12 NOTE — UTILIZATION REVIEW
Continued Stay Review for 6/11/23    Date: 6/12/23                           Current Patient Class: IP  Current Level of Care: MS    HPI:48 y o  female initially admitted on 5/29/23 - DX:  Crohn's colitis  / Mast cell activation syndrome  / Bladder tumor / Vomiting and diarrhea / Seizure    Assessment/Plan:   6/11/23: Patient continues to have a variable appetite, some last night about her meals but reports having a good lunch yesterday  She continues to have variable abdominal pain as well, similar to prior  Mild abdominal tenderness periumbilical region; Pain 6-30/49; She reports she has better urine flow now but is a little bit of burning with urination, she reports a tinge of blood in her urine as well which is anticipated after procedure and fulguration   Continue Benadryl 50 mg IM twice daily, Protonix 40 mg twice daily, pepcid twice daily, and steroids --> now s/p clint-operative prep and post need for IV medications; Switching from IV formulations to PO (her home meds which she tolerates at home)  Plan: cont ivf; monitor labs; pain / nausea control (see below); monitoring for Mast cell post procedure and transition to PO medications    Vital Signs:   Date/Time Temp Pulse Resp BP MAP (mmHg) SpO2 O2 Device Patient Position - Orthostatic VS   06/12/23 15:36:14 98 6 °F (37 °C) 104 20 121/86 98 95 % -- --   06/12/23 1136 -- -- -- -- -- -- None (Room air) --   06/12/23 05:50:34 98 3 °F (36 8 °C) 69 18 120/86 97 91 % None (Room air) Lying         Pertinent Labs/Diagnostic Results:       Results from last 7 days   Lab Units 06/12/23  1012 06/09/23  0553 06/06/23  0454   HEMATOCRIT % 41 6 39 7 42 3   HEMOGLOBIN g/dL 13 6 13 0 13 6   NEUTROS ABS Thousands/µL 11 08*  --   --    PLATELETS Thousands/uL 464* 514* 555*   WBC Thousand/uL 15 61* 21 84* 19 32*         Results from last 7 days   Lab Units 06/12/23  1012 06/09/23  0553   ANION GAP mmol/L 11 8   BUN mg/dL 14 20   CALCIUM mg/dL 8 5 7 9*   CHLORIDE mmol/L 98 102 CO2 mmol/L 28 26   CREATININE mg/dL 0 74 0 69   EGFR ml/min/1 73sq m 94 101   POTASSIUM mmol/L 3 6 3 7   MAGNESIUM mg/dL 2 0 2 1   PHOSPHORUS mg/dL 3 9  --    SODIUM mmol/L 137 136     Results from last 7 days   Lab Units 06/12/23  1012 06/09/23  0553   ALBUMIN g/dL 3 2* 3 2*   ALK PHOS U/L 52 59   ALT U/L 43 38   AST U/L 17 12*   TOTAL BILIRUBIN mg/dL 0 33 0 23   TOTAL PROTEIN g/dL 5 6* 5 2*     Results from last 7 days   Lab Units 06/10/23  0753   POC GLUCOSE mg/dl 100     Results from last 7 days   Lab Units 06/12/23  1012 06/09/23  0553   GLUCOSE RANDOM mg/dL 161* 132       Results from last 7 days   Lab Units 06/07/23  1656   LIPASE u/L 27       Medications:   Scheduled Medications:  ascorbic acid, 500 mg, Oral, BID With Meals  cholecalciferol, 1,200 Units, Oral, BID  diphenhydrAMINE, 50 mg, Intramuscular, Q6H  famotidine, 40 mg, Oral, Daily With Dinner  levothyroxine, 75 mcg, Oral, Early Morning  liothyronine, 5 mcg, Oral, Daily  pantoprazole, 40 mg, Oral, Early Morning  potassium-sodium phosphateS, 1 tablet, Oral, TID With Meals  predniSONE, 20 mg, Oral, TID  Riboflavin, 100 mg, Oral, BID      Continuous IV Infusions:  sodium chloride, 75 mL/hr, Intravenous, Continuous      PRN Meds:  acetaminophen, 650 mg, Oral, Q6H PRN  albuterol, 2 puff, Inhalation, Q4H PRN  butalbital-acetaminophen-caffeine, 1 tablet, Oral, Q8H PRN  diphenhydrAMINE, 50 mg, Intravenous, Q6H PRN   HYDROmorphone, 1 mg, Intravenous, Q6H PRN (6/11 rec'd x5)   LORazepam, 1 mg, Intravenous, Q4H PRN  (6/11 rec'd x4)   ondansetron, 4 mg, Intravenous, Q6H PRN   (6/11 rec'd x3)  predniSONE, 20 mg, Oral, Daily PRN        Discharge Plan: D    Network Utilization Review Department  ATTENTION: Please call with any questions or concerns to [phone] and carefully listen to the prompts so that you are directed to the right person   All voicemails are confidential   James Golden all requests for admission clinical reviews, approved or denied determinations and any other requests to dedicated fax number below belonging to the campus where the patient is receiving treatment   List of dedicated fax numbers for the Facilities:  [address] [place] [address] DENIALS (Administrative/Medical Necessity) [phone]   [address] (Maternity/NICU/Pediatrics) [phone]   [address] [phone]   Robert Sethi [address] [phone]   [address] [address] [facility] [phone]   [address] [facility] 134 815 [facility] [phone]

## 2023-06-12 NOTE — PROGRESS NOTES
[address] [facility] [address]  Progress Note  Name: Geo Marquez  MRN: [id number]  Unit/Bed#: 031-66 I Date of Admission: 5/29/2023   Date of Service: 6/12/2023  Hospital Day: 14    Assessment/Plan   * Crohn's colitis ([facility] [address] )  Assessment & Plan  · Stable, no bloody diarrhea  · On Stelara and omalizumab infusion treatment as outpatient  · Patient missed all infusions past week prior to admission  · She reports taking budesonide 3 mg 3 times daily at home previously but now states she was most recently taking prednisone, unclear what dose but seems like most recent was 40 mg twice daily  · GI consulted, appreciate  · She completed prior IV course for GI  · Still reporting some nausea and mild abdominal pain however tolerating full diet  · Was on prednisone 40 twice daily reports abdominal pain with the high dose of steroids we will switch to 20 every 8h      Bladder tumor  Assessment & Plan  · Outpatient follow-up with urology patient had been having prior urinary issues before admission, planned for outpatient cystoscopy  · Patient reported bright red blood in urine 6/3, then reported pink tinged and she is having some symptoms of frequency and straining with urination  · Patient was scheduled for outpatient procedure, for which she typically needs significant prep 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  · Her urologist was contacted, see chart, plan for while inpatient as she missed her appointment and given her needs for prep/monitoring  · S/p cystoscopy 6/8, which found likely bladder tumor consistent with TCC, underwent fulguration and biopsy  · Outpatient follow-up with urology    Seizure [facility])  Assessment & Plan  · Ativan as needed  · Not on any other seizure medications at home  · EEG completed  Patient had one of her typical events  No epileptic activity associated with the event   Consistent with psychogenic seizures, though seizures can be result of Mast cell disease  · No evidence further seizures · Outpatient follow-up    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[address] )  Assessment & Plan  · Follows with mast cell activation specialist at the [facility]  · Continue Benadryl 50 mg IM twice daily, Protonix 40 mg twice daily, pepcid twice daily, and steroids --> now s/p clint-operative prep and post need for IV medications  · Switched from IV formulations to PO (her home meds which she tolerates at home)      Hypothyroidism (acquired)  Assessment & Plan  · Continue Synthroid    SIRS (systemic inflammatory response syndrome) (HCC)  Assessment & Plan  SIRS, resolved, possibly due to Crohn's disease/Viral gastroenteritis; presented with WBC 13 25 and   Resolved SIRS         Vomiting and diarrhea  Assessment & Plan  Presented to ED with ongoing nonbloody nonbilious emesis and nonbloody diarrhea since EGD colonoscopy on 5/2/2023  · Stool C  difficile and PCR negative  · Met SIRS criteria on admission likely due to Crohn's vs viral gastroenteritis and resovled  Symptoms have since improved no further vomiting or diarrhea  Does report some nausea         VTE Pharmacologic Prophylaxis:   Pharmacologic: Pharmacologic VTE Prophylaxis contraindicated due to low risk  Mechanical VTE Prophylaxis in Place: Yes    Patient Centered Rounds: I have performed bedside rounds with nursing staff today  Discussions with Specialists or Other Care Team Provider: cm, nursing    Education and Discussions with Family / Patient: pt    Time Spent for Care: 30 minutes  More than 50% of total time spent on counseling and coordination of care as described above      Current Length of Stay: 14 day(s)    Current Patient Status: Inpatient   Certification Statement: The patient will continue to require additional inpatient hospital stay due to see below    Discharge Plan: Anticipate discharge tomorrow if able to tolerate p o  intake    Code Status: Level 1 - Full Code      Subjective:   Denies fevers, chills, cough, chest pain    Objective:     Vitals: Temp (24hrs), Av 5 °F (36 9 °C), Min:98 3 °F (36 8 °C), Max:98 6 °F (37 °C)    Temp:  [98 3 °F (36 8 °C)-98 6 °F (37 °C)] 98 3 °F (36 8 °C)  HR:  [69-98] 69  Resp:  [18] 18  BP: (120-134)/(86-90) 120/86  SpO2:  [91 %-94 %] 91 %  Body mass index is 25 53 kg/m²  Input and Output Summary (last 24 hours): Intake/Output Summary (Last 24 hours) at 2023 0959  Last data filed at 2023 9443  Gross per 24 hour   Intake 2186 25 ml   Output 750 ml   Net 1436 25 ml       Physical Exam:     Physical Exam  Constitutional:       General: She is not in acute distress  Appearance: She is well-developed  She is not diaphoretic  HENT:      Head: Normocephalic and atraumatic  Nose: Nose normal       Mouth/Throat:      Pharynx: No oropharyngeal exudate  Eyes:      General: No scleral icterus  Right eye: No discharge  Left eye: No discharge  Conjunctiva/sclera: Conjunctivae normal    Neck:      Thyroid: No thyromegaly  Vascular: No JVD  Cardiovascular:      Rate and Rhythm: Normal rate and regular rhythm  Heart sounds: Normal heart sounds  No murmur heard  No friction rub  No gallop  Pulmonary:      Effort: Pulmonary effort is normal  No respiratory distress  Breath sounds: Normal breath sounds  No wheezing or rales  Chest:      Chest wall: No tenderness  Abdominal:      General: Bowel sounds are normal  There is no distension  Palpations: Abdomen is soft  Tenderness: There is no abdominal tenderness  There is no guarding or rebound  Musculoskeletal:         General: No tenderness or deformity  Normal range of motion  Cervical back: Normal range of motion and neck supple  Skin:     General: Skin is warm and dry  Findings: No erythema or rash  Neurological:      Mental Status: She is alert  Mental status is at baseline  Cranial Nerves: No cranial nerve deficit  Sensory: No sensory deficit        Motor: No abnormal muscle tone       Coordination: Coordination normal            Additional Data:     Labs:    Results from last 7 days   Lab Units 06/09/23  0553   HEMATOCRIT % 39 7   HEMOGLOBIN g/dL 13 0   PLATELETS Thousands/uL 514*   WBC Thousand/uL 21 84*     Results from last 7 days   Lab Units 06/09/23  0553   ANION GAP mmol/L 8   ALBUMIN g/dL 3 2*   ALK PHOS U/L 59   ALT U/L 38   AST U/L 12*   BUN mg/dL 20   CALCIUM mg/dL 7 9*   CHLORIDE mmol/L 102   CO2 mmol/L 26   CREATININE mg/dL 0 69   GLUCOSE RANDOM mg/dL 132   POTASSIUM mmol/L 3 7   SODIUM mmol/L 136   TOTAL BILIRUBIN mg/dL 0 23         Results from last 7 days   Lab Units 06/10/23  0753   POC GLUCOSE mg/dl 100                   * I Have Reviewed All Lab Data Listed Above  * Additional Pertinent Lab Tests Reviewed:  All Labs Within Last 24 Hours Reviewed    Imaging:    Imaging Reports Reviewed Today Include: na  Imaging Personally Reviewed by Myself Includes:  na    Recent Cultures (last 7 days):           Last 24 Hours Medication List:   Current Facility-Administered Medications   Medication Dose Route Frequency Provider Last Rate   • acetaminophen  650 mg Oral Q6H PRFLORENCIO Cannon MD     • albuterol  2 puff Inhalation Q4H PRFLORENCIO Cannon MD     • ascorbic acid  500 mg Oral BID With Meals Pro Chirag Counts, DO     • butalbital-acetaminophen-caffeine  1 tablet Oral Q8H PRFLORENCIO Cannon MD     • cholecalciferol  1,200 Units Oral BID Pro Chirag Counts, DO     • diphenhydrAMINE  50 mg Intramuscular Q6H Caro Cannon MD     • diphenhydrAMINE  50 mg Intravenous Q6H PRFLORENCIO Cannon MD     • famotidine  40 mg Oral Daily With [employer] Counts, DO     • HYDROmorphone  1 mg Intravenous Q4H PRFLORENCIO Cannon MD     • levothyroxine  75 mcg Oral Early Morning Pro Chirag Counts, DO     • liothyronine  5 mcg Oral Daily Pro Chirag Counts, DO     • LORazepam  1 mg Intravenous Q4H PRFLORENCIO Cannon MD     • ondansetron  4 mg Intravenous Q6H PRFLORENCIO Cannon MD     • pantoprazole  40 mg Oral Early Morning Pro [employer] Counts, DO     • potassium-sodium phosphateS  1 tablet Oral TID With Meals Reinier Wadsworth MD     • predniSONE  20 mg Oral Daily PRN Reinier Wadsworth MD     • predniSONE  20 mg Oral TID Yanick Guzmán MD     • Riboflavin  100 mg Oral BID Pro Beard Counts, DO     • sodium chloride  75 mL/hr Intravenous Continuous Reinier Wadsworth MD 75 mL/hr (06/11/23 1613)        Today, Patient Was Seen By: Yanick Guzmán MD    ** Please Note: Dictation voice to text software may have been used in the creation of this document   **

## 2023-06-13 ENCOUNTER — TRANSITIONAL CARE MANAGEMENT (OUTPATIENT)
Dept: FAMILY MEDICINE CLINIC | Facility: CLINIC | Age: 48
End: 2023-06-13

## 2023-06-13 ENCOUNTER — TELEPHONE (OUTPATIENT)
Dept: UROLOGY | Facility: CLINIC | Age: 48
End: 2023-06-13

## 2023-06-13 VITALS
HEIGHT: 65 IN | RESPIRATION RATE: 18 BRPM | TEMPERATURE: 97.9 F | WEIGHT: 152.12 LBS | OXYGEN SATURATION: 95 % | BODY MASS INDEX: 25.34 KG/M2 | DIASTOLIC BLOOD PRESSURE: 84 MMHG | SYSTOLIC BLOOD PRESSURE: 115 MMHG | HEART RATE: 109 BPM

## 2023-06-13 LAB
ANION GAP SERPL CALCULATED.3IONS-SCNC: 9 MMOL/L (ref 4–13)
BASOPHILS # BLD MANUAL: 0 THOUSAND/UL (ref 0–0.1)
BASOPHILS NFR MAR MANUAL: 0 % (ref 0–1)
BUN SERPL-MCNC: 25 MG/DL (ref 5–25)
CALCIUM SERPL-MCNC: 9.1 MG/DL (ref 8.4–10.2)
CHLORIDE SERPL-SCNC: 95 MMOL/L (ref 96–108)
CO2 SERPL-SCNC: 30 MMOL/L (ref 21–32)
CREAT SERPL-MCNC: 0.7 MG/DL (ref 0.6–1.3)
EOSINOPHIL # BLD MANUAL: 0 THOUSAND/UL (ref 0–0.4)
EOSINOPHIL NFR BLD MANUAL: 0 % (ref 0–6)
ERYTHROCYTE [DISTWIDTH] IN BLOOD BY AUTOMATED COUNT: 15.1 % (ref 11.6–15.1)
GFR SERPL CREATININE-BSD FRML MDRD: 101 ML/MIN/1.73SQ M
GLUCOSE SERPL-MCNC: 144 MG/DL (ref 65–140)
HCT VFR BLD AUTO: 42.3 % (ref 34.8–46.1)
HGB BLD-MCNC: 14 G/DL (ref 11.5–15.4)
HYPERCHROMIA BLD QL SMEAR: PRESENT
LYMPHOCYTES # BLD AUTO: 0 % (ref 14–44)
LYMPHOCYTES # BLD AUTO: 0 THOUSAND/UL (ref 0.6–4.47)
MCH RBC QN AUTO: 30.6 PG (ref 26.8–34.3)
MCHC RBC AUTO-ENTMCNC: 33.1 G/DL (ref 31.4–37.4)
MCV RBC AUTO: 92 FL (ref 82–98)
MONOCYTES # BLD AUTO: 0.37 THOUSAND/UL (ref 0–1.22)
MONOCYTES NFR BLD: 2 % (ref 4–12)
NEUTROPHILS # BLD MANUAL: 18.07 THOUSAND/UL (ref 1.85–7.62)
NEUTS SEG NFR BLD AUTO: 98 % (ref 43–75)
PLATELET # BLD AUTO: 500 THOUSANDS/UL (ref 149–390)
PLATELET BLD QL SMEAR: ABNORMAL
PMV BLD AUTO: 8.9 FL (ref 8.9–12.7)
POTASSIUM SERPL-SCNC: 4.1 MMOL/L (ref 3.5–5.3)
RBC # BLD AUTO: 4.58 MILLION/UL (ref 3.81–5.12)
SODIUM SERPL-SCNC: 134 MMOL/L (ref 135–147)
WBC # BLD AUTO: 18.44 THOUSAND/UL (ref 4.31–10.16)

## 2023-06-13 PROCEDURE — 80048 BASIC METABOLIC PNL TOTAL CA: CPT | Performed by: INTERNAL MEDICINE

## 2023-06-13 PROCEDURE — 85007 BL SMEAR W/DIFF WBC COUNT: CPT | Performed by: INTERNAL MEDICINE

## 2023-06-13 PROCEDURE — 99239 HOSP IP/OBS DSCHRG MGMT >30: CPT

## 2023-06-13 PROCEDURE — 88360 TUMOR IMMUNOHISTOCHEM/MANUAL: CPT | Performed by: STUDENT IN AN ORGANIZED HEALTH CARE EDUCATION/TRAINING PROGRAM

## 2023-06-13 PROCEDURE — 85027 COMPLETE CBC AUTOMATED: CPT | Performed by: INTERNAL MEDICINE

## 2023-06-13 PROCEDURE — 88305 TISSUE EXAM BY PATHOLOGIST: CPT | Performed by: STUDENT IN AN ORGANIZED HEALTH CARE EDUCATION/TRAINING PROGRAM

## 2023-06-13 PROCEDURE — 88342 IMHCHEM/IMCYTCHM 1ST ANTB: CPT | Performed by: STUDENT IN AN ORGANIZED HEALTH CARE EDUCATION/TRAINING PROGRAM

## 2023-06-13 PROCEDURE — 88341 IMHCHEM/IMCYTCHM EA ADD ANTB: CPT | Performed by: STUDENT IN AN ORGANIZED HEALTH CARE EDUCATION/TRAINING PROGRAM

## 2023-06-13 RX ORDER — DIPHENHYDRAMINE HYDROCHLORIDE 50 MG/ML
25 INJECTION INTRAMUSCULAR; INTRAVENOUS EVERY 6 HOURS PRN
Qty: 25 ML | Refills: 0 | Status: SHIPPED | OUTPATIENT
Start: 2023-06-13 | End: 2023-06-23 | Stop reason: SDUPTHER

## 2023-06-13 RX ORDER — PREDNISONE 20 MG/1
20 TABLET ORAL 2 TIMES DAILY WITH MEALS
Qty: 60 TABLET | Refills: 0 | Status: SHIPPED | OUTPATIENT
Start: 2023-06-13

## 2023-06-13 RX ORDER — LIOTHYRONINE SODIUM 5 UG/1
5 TABLET ORAL DAILY
Qty: 30 TABLET | Refills: 0 | Status: SHIPPED | OUTPATIENT
Start: 2023-06-14

## 2023-06-13 RX ADMIN — LORAZEPAM 1 MG: 2 INJECTION INTRAMUSCULAR; INTRAVENOUS at 10:39

## 2023-06-13 RX ADMIN — ONDANSETRON 4 MG: 2 INJECTION INTRAMUSCULAR; INTRAVENOUS at 09:09

## 2023-06-13 RX ADMIN — LORAZEPAM 1 MG: 2 INJECTION INTRAMUSCULAR; INTRAVENOUS at 03:48

## 2023-06-13 RX ADMIN — HYDROMORPHONE HYDROCHLORIDE 1 MG: 1 INJECTION, SOLUTION INTRAMUSCULAR; INTRAVENOUS; SUBCUTANEOUS at 06:09

## 2023-06-13 RX ADMIN — PREDNISONE 20 MG: 20 TABLET ORAL at 09:15

## 2023-06-13 RX ADMIN — DIPHENHYDRAMINE HYDROCHLORIDE 50 MG: 50 INJECTION, SOLUTION INTRAMUSCULAR; INTRAVENOUS at 16:14

## 2023-06-13 RX ADMIN — Medication 100 MG: at 09:24

## 2023-06-13 RX ADMIN — LIOTHYRONINE SODIUM 5 MCG: 5 TABLET ORAL at 09:16

## 2023-06-13 RX ADMIN — HYDROMORPHONE HYDROCHLORIDE 1 MG: 1 INJECTION, SOLUTION INTRAMUSCULAR; INTRAVENOUS; SUBCUTANEOUS at 19:06

## 2023-06-13 RX ADMIN — ONDANSETRON 4 MG: 2 INJECTION INTRAMUSCULAR; INTRAVENOUS at 16:15

## 2023-06-13 RX ADMIN — DIPHENHYDRAMINE HYDROCHLORIDE 50 MG: 50 INJECTION, SOLUTION INTRAMUSCULAR; INTRAVENOUS at 03:48

## 2023-06-13 RX ADMIN — LEVOTHYROXINE SODIUM 75 MCG: 50 TABLET ORAL at 06:10

## 2023-06-13 RX ADMIN — ONDANSETRON 4 MG: 2 INJECTION INTRAMUSCULAR; INTRAVENOUS at 03:48

## 2023-06-13 RX ADMIN — OXYCODONE HYDROCHLORIDE AND ACETAMINOPHEN 500 MG: 500 TABLET ORAL at 08:07

## 2023-06-13 RX ADMIN — HYDROMORPHONE HYDROCHLORIDE 1 MG: 1 INJECTION, SOLUTION INTRAMUSCULAR; INTRAVENOUS; SUBCUTANEOUS at 00:14

## 2023-06-13 RX ADMIN — OXYCODONE HYDROCHLORIDE AND ACETAMINOPHEN 500 MG: 500 TABLET ORAL at 16:16

## 2023-06-13 RX ADMIN — PANTOPRAZOLE SODIUM 40 MG: 40 TABLET, DELAYED RELEASE ORAL at 06:10

## 2023-06-13 RX ADMIN — LORAZEPAM 1 MG: 2 INJECTION INTRAMUSCULAR; INTRAVENOUS at 16:15

## 2023-06-13 RX ADMIN — HYDROMORPHONE HYDROCHLORIDE 1 MG: 1 INJECTION, SOLUTION INTRAMUSCULAR; INTRAVENOUS; SUBCUTANEOUS at 13:15

## 2023-06-13 RX ADMIN — Medication 1200 UNITS: at 09:14

## 2023-06-13 RX ADMIN — DIPHENHYDRAMINE HYDROCHLORIDE 50 MG: 50 INJECTION, SOLUTION INTRAMUSCULAR; INTRAVENOUS at 09:09

## 2023-06-13 RX ADMIN — SODIUM CHLORIDE 75 ML/HR: 0.9 INJECTION, SOLUTION INTRAVENOUS at 06:08

## 2023-06-13 RX ADMIN — PREDNISONE 20 MG: 20 TABLET ORAL at 16:19

## 2023-06-13 RX ADMIN — FAMOTIDINE 40 MG: 40 TABLET, FILM COATED ORAL at 16:16

## 2023-06-13 NOTE — ASSESSMENT & PLAN NOTE
"Ongoing nonbloody nonbilious emesis and nonbloody diarrhea since EGD colonoscopy on 5/2/2023  · Reported this had been worsening over the past week  · Vomiting improved with IV Zofran-continue p o  as needed  Diarrhea appears resolved  · Stool C  difficile and PCR negative  · Received 1 5 L normal saline bolus in ED, continued normal saline 100 cc/h for maintenance - discontinued last night  Patient concerned because she feels dehydrated and states she has \"poor IV access\"   Will resume IV fluids at 75cc/hr for now  · Met SIRS criteria on admission likely due to Crohn's vs viral gastroenteritis and resovled  · Replace electrolytes as needed    "
"Patient reports bright red blood in urine yesterday, now reported pink tinged urine  Per patient has cystoscopy scheduled Tuesday with [facility]  Patient reports cystoscopy is being done because she has \"spots that could be cancer\"  On review of urology notes, diagnosis for cytoscopy is microscopic hematuria and kidney stones  Discussed discharge plans with patient  She has appt scheduled tomorrow at [facility] for preop with outpatient cysto planned for Tuesday in [facility]  She does not feel comfortable being discharged today with the \"new development of pink tinged urine\"  She is aware that the cysto will likely not be performed at this campus and it may end up being delayed if she feels she is not feeling well enough to be discharged home today     Consult placed to urology  "
"Presented to ED with ongoing nonbloody nonbilious emesis and nonbloody diarrhea since EGD colonoscopy on 5/2/2023  · Reported this had been worsening over the past week  · Vomiting improved with IV Zofran-continue p o  as needed  Diarrhea appears resolved  · Stool C  difficile and PCR negative  · Received 1 5 L normal saline bolus in ED, continued normal saline 100 cc/h for maintenance - discontinued last night  Patient concerned because she feels dehydrated and states she has \"poor IV access\"  Will resume IV fluids at 75cc/hr for now  Patient states she still \"feels dry\"  Admits her dietary choices here in the hospital are limited due to her multiple food allergies  · Met SIRS criteria on admission likely due to Crohn's vs viral gastroenteritis and resovled  · Replace electrolytes as needed   Mag and K normal today    "
"· Ativan as needed  · Not on any other seizure medications at home  · EEG completed  Patient had one of her typical events  No epileptic activity associated with the event  Consistent with psychogenic seizures  · Recommend outpatient therapy  · Patient states she still does not feel \"back to normal\" since her recent event     "
Decreased p o  intake for the past week due to persistent vomiting and diarrhea    · Patient is not tolerating diet, all her meals are brought from home by her  due to her reported corn allergy
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
Decreased p o  intake for the past week due to persistent vomiting and diarrhea    · Symptomatic management vomiting and diarrhea as below to help moderate oral intake  · Start with surgical soft diet, advanced to regular diet  · Encourage p o  intake is much as possible  · Appears to have a 6 pound weight loss over the past month when weight is trended  · Still having difficulty with eating, maintain on IVF
Decreased p o  intake for the past week due to persistent vomiting and diarrhea  · Symptomatic management vomiting and diarrhea as below to help moderate oral intake  · Start with surgical soft diet  · Encourage p o  intake is much as possible  · Appears to have a 6 pound weight loss over the past month when weight is trended
Decreased p o  intake for the past week due to persistent vomiting and diarrhea  · Symptomatic management vomiting and diarrhea as below to help moderate oral intake  · Start with surgical soft diet  · Encourage p o  intake is much as possible  · Appears to have a 6 pound weight loss over the past month when weight is trended
Decreased p o  intake for the past week due to persistent vomiting and diarrhea  · Symptomatic management vomiting and diarrhea as below to help moderate oral intake  · Start with surgical soft diet, advanced to regular diet  · Encourage p o  intake is much as possible  · Appears to have a 6 pound weight loss over the past month when weight is trended
Follows with mast cell activation specialist at the [facility]  Continue Benadryl 50 mg twice daily, Protonix 40 mg twice daily
Follows with mast cell activation specialist at the [facility]  Continue Benadryl 50 mg twice daily, Protonix 40 mg twice daily
Ongoing nonbloody nonbilious emesis and nonbloody diarrhea since EGD colonoscopy on 5/2/2023  · Reported this had been worsening over the past week  · Vomiting improved with IV Zofran-continue p o  as needed    Diarrhea appears resolved  · Stool C  difficile and PCR negative  · Received 1 5 L normal saline bolus in ED, continued normal saline 100 cc/h for maintenance - decrease to 75 mL/hr then discontinue  · Met SIRS criteria on admission likely due to Crohn's vs viral gastroenteritis and resovled  · Replace electrolytes as needed
Ongoing nonbloody nonbilious emesis and nonbloody diarrhea since EGD colonoscopy on 5/2/2023  · Reports this has been worsening over the past week  · Vomiting improved with IV Zofran-continue p o  as needed  · C  difficile ordered in ED per patient request though low to no suspicion as no prolonged hospitalization recently, no recent antibiotic use, no prior history of infection  · Received 1 5 L normal saline bolus in ED, continue normal saline 100 cc/h for maintenance  · Leukocytosis likely stress response vs viral gastroenteritis as symptoms worsening over the past week after resolution  · GI consulted as per patient GI symptoms have been ongoing since scoping on 5/2/2023
Ongoing nonbloody nonbilious emesis and nonbloody diarrhea since EGD colonoscopy on 5/2/2023  · Reports this has been worsening over the past week  · Vomiting improved with IV Zofran-continue p o  as needed  · C  difficile ordered in ED per patient request though low to no suspicion as no prolonged hospitalization recently, no recent antibiotic use, no prior history of infection  · Received 1 5 L normal saline bolus in ED, continue normal saline 100 cc/h for maintenance  · Leukocytosis likely stress response vs viral gastroenteritis as symptoms worsening over the past week after resolution  · GI consulted as per patient GI symptoms have been ongoing since scoping on 5/2/2023  · Stool C  difficile and culture are negative  · Replace electrolytes as needed  · Vomiting has stopped but diarrhea persists, diet as tolerated
Ongoing nonbloody nonbilious emesis and nonbloody diarrhea since EGD colonoscopy on 5/2/2023  · Reports this has been worsening over the past week  · Vomiting improved with IV Zofran-continue p o  as needed  · C  difficile ordered in ED per patient request though low to no suspicion as no prolonged hospitalization recently, no recent antibiotic use, no prior history of infection  · Received 1 5 L normal saline bolus in ED, continue normal saline 100 cc/h for maintenance  · Leukocytosis likely stress response vs viral gastroenteritis as symptoms worsening over the past week after resolution  · GI consulted as per patient GI symptoms have been ongoing since scoping on 5/2/2023  · Stool C  difficile and culture are negative  · Replace electrolytes as needed  · Vomiting has stopped but diarrhea persists, diet as tolerated
Ongoing nonbloody nonbilious emesis and nonbloody diarrhea since EGD colonoscopy on 5/2/2023  · Reports this has been worsening over the past week  · Vomiting improved with IV Zofran-continue p o  as needed  · C  difficile ordered in ED per patient request though low to no suspicion as no prolonged hospitalization recently, no recent antibiotic use, no prior history of infection  · Received 1 5 L normal saline bolus in ED, continue normal saline 100 cc/h for maintenance  · Leukocytosis likely stress response vs viral gastroenteritis as symptoms worsening over the past week after resolution  · GI consulted as per patient GI symptoms have been ongoing since scoping on 5/2/2023  · Stool C  difficile and culture are pending  · Replace electrolytes as needed
Patient reports bright red blood in urine overnight  Per patient has cystoscopy scheduled Tuesday with [facility]  Reports difficulty fully emptying bladder and was sitting on the toilet for long periods of time overnight   Getting bladder scans, retention protocol ordered
Presented to ED with ongoing nonbloody nonbilious emesis and nonbloody diarrhea since EGD colonoscopy on 5/2/2023  · Reported this had been worsening over the past week  · Vomiting improved with IV Zofran-continue p o  as needed  Diarrhea appears resolved  · Stool C  difficile and PCR negative  · Received 1 5 L normal saline bolus in ED, continued normal saline 100 cc/h for maintenance - was discontinued and now restarted at 75cc/hr, will continue as she reports her PO intake limited here due to allergy  · Met SIRS criteria on admission likely due to Crohn's vs viral gastroenteritis and resovled  · Replace electrolytes as needed   Mag and K normal today
Presented to ED with ongoing nonbloody nonbilious emesis and nonbloody diarrhea since EGD colonoscopy on 5/2/2023  · Stool C  difficile and PCR negative  · Met SIRS criteria on admission likely due to Crohn's vs viral gastroenteritis and resovled  Symptoms have since improved no further vomiting or diarrhea  Does report some nausea
Presented to ED with ongoing nonbloody nonbilious emesis and nonbloody diarrhea since EGD colonoscopy on 5/2/2023  · Stool C  difficile and PCR negative  · Met SIRS criteria on admission likely due to Crohn's vs viral gastroenteritis and resovled  Symptoms have since improved no further vomiting or diarrhea  Does report some nausea
Presented to ED with ongoing nonbloody nonbilious emesis and nonbloody diarrhea since EGD colonoscopy on 5/2/2023  · Stool C  difficile and PCR negative  · Met SIRS criteria on admission likely due to Crohn's vs viral gastroenteritis and resovled  · Symptoms improved/resolved
SIRS, resolved, possibly due to Crohn's disease/Viral gastroenteritis; presented with WBC 13 25 and   Resolved SIRS   
SIRS, resolved, possibly due to Crohn's disease/Viral gastroenteritis; presented with WBC 13 25 and   Resolved SIRS   
SIRS, resolved, possibly due to Crohn's disease/Viral gastroenteritis; presented with WBC 13 25 and   · Resolved SIRS, see above      
SIRS, resolved, possibly due to Crohn's disease/Viral gastroenteritis; presented with WBC 13 25 and   · Stool cultures negative  · treated with IVF, IV Medrol, IV Protonix,   · GI input appreciated  · Monitor VS, CBC      
SIRS, resolved, possibly due to Crohn's disease/Viral gastroenteritis; presented with WBC 13 25 and   · Stool cultures negative  · treated with IVF, IV Medrol, IV Protonix,   · GI input appreciated  · Monitor VS, CBC  · WBC increased today, likely due to IV steroids but with urinary symptoms, will check UA      
SIRS, resolved, possibly due to Crohn's disease/Viral gastroenteritis; presented with WBC 13 25 and   · Stool cultures negative  · treated with IVF, IV Medrol, IV Protonix,   · GI input appreciated  · Monitor VS, CBC  · WBC today 14 39 down from 16 06, likely elevated due to IV steroids      
SIRS, resolved, possibly due to Crohn's disease/Viral gastroenteritis; presented with WBC 13 25 and   · Stool cultures negative  · treated with IVF, IV Medrol, IV Protonix,   · GI input appreciated  · Monitor VS, CBC  · WBC today 16 06, likely elevated due to IV steroids      
See
Sodium 133 on arrival  · Likely hypovolemic hyponatremia in setting of vomiting and diarrhea  · Received normal saline 1 5 L bolus in ED  · Continue IV normal saline 100 cc/h for rehydration  · Resolved with fluids
Sodium 133 on arrival  · Likely hypovolemic hyponatremia in setting of vomiting and diarrhea  · Received normal saline 1 5 L bolus in ED  · Continue IV normal saline 100 cc/h for rehydration  · Resolved with fluids
Sodium 133 on arrival  · Likely hypovolemic hyponatremia in setting of vomiting and diarrhea  · Received normal saline 1 5 L bolus in ED  · Recheck BMP at 9 AM  · Continue IV normal saline 100 cc/h for rehydration
Stable, no bloody diarrhea  Continue home budesonide 3 mg 3 times daily [nonformulary, patient does have medication with her] and prednisone 20 mg daily as needed  On Stelara and omalizumab infusion treatment with IV hydration-next scheduled on 5/30/2023  Patient missed all infusions this past week
Stable, no bloody diarrhea  Continue home budesonide 3 mg 3 times daily [nonformulary, patient does have medication with her] and prednisone 20 mg daily as needed  On Stelara and omalizumab infusion treatment with IV hydration-next scheduled on 5/30/2023  Patient missed all infusions this past week  Continue [facility]  GI Consult
TSH WNL 1 889  Continue levothyroxine 100 mcg daily
TSH WNL 1 889  Continue levothyroxine 100 mcg daily
· Ativan as needed  · Not on any other seizure medications at home  · EEG completed  Patient had one of her typical events  No epileptic activity associated with the event   Consistent with psychogenic seizures  · Recommend outpatient therapy
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
· Ativan as needed  · Not on any other seizure medications at home  · EEG completed  Patient had one of her typical events  No epileptic activity associated with the event   Consistent with psychogenic seizures, though seizures can be result of Mast cell disease  · No evidence further seizures   · Outpatient follow-up
· Ativan as needed  · Not on any other seizure medications at home  · EEG completed  Patient had one of her typical events  No epileptic activity associated with the event   Consistent with psychogenic seizures, though seizures can be result of Mast cell disease  · No evidence further seizures   · Outpatient follow-up
· Ativan as needed  · Not on any other seizure medications at home  · EEG completed  Patient had one of her typical events  No epileptic activity associated with the event   Consistent with psychogenic seizures, though seizures can be result of Mast cell disease  · No evidence further seizures   · Recommend outpatient therapy
· Ativan as needed  · Not on any other seizure medications at home  · EEG completed today, awaiting final read
· Continue Synthroid
· Continue Synthroid
· Follows with mast cell activation specialist at the [facility]  · Continue Benadryl 50 mg IM twice daily, Protonix 40 mg twice daily, pepcid twice daily, and steroids --> now s/p clint-operative prep and post need for IV medications  · Switched from IV formulations to PO (her home meds which she tolerates at home)
· Follows with mast cell activation specialist at the [facility]  · Continue Benadryl 50 mg IM twice daily, Protonix 40 mg twice daily, pepcid twice daily  · Monitor symptoms
· Follows with mast cell activation specialist at the [facility]  · Continue Benadryl 50 mg IM twice daily, Protonix 40 mg twice daily, pepcid twice daily  · Watch for any signs of reaction
· Follows with mast cell activation specialist at the [facility]  · Continue Benadryl 50 mg IM twice daily, Protonix 40 mg twice daily, pepcid twice daily  · Monitor symptoms
· Follows with mast cell activation specialist at the [facility]  · Continue Benadryl 50 mg IM twice daily, Protonix 40 mg twice daily, pepcid twice daily  · Monitor symptoms
· Follows with mast cell activation specialist at the [facility]  · Continue Benadryl 50 mg IM twice daily, Protonix 40 mg twice daily, pepcid twice daily, and steroids --> now s/p clint-operative prep and post need for IV medications  · Switching from IV formulations to PO (her home meds which she tolerates at home)  · Monitor symptoms  · Continue IVF  · Monitor labs
· Follows with mast cell activation specialist at the [facility]  · Continue Benadryl 50 mg IM twice daily, Protonix 40 mg twice daily, pepcid twice daily, and steroids --> now s/p clint-operative prep and post need for IV medications  · Switched from IV formulations to PO (her home meds which she tolerates at home)  · Monitor symptoms  · Continue IVF
· Follows with mast cell activation specialist at the [facility]  · Continue Benadryl 50 mg IM twice daily, Protonix 40 mg twice daily, pepcid twice daily  · Monitor symptoms
· Follows with mast cell activation specialist at the [facility]  · Continue Benadryl 50 mg IM twice daily, Protonix 40 mg twice daily, pepcid twice daily  · Monitor symptoms  · She needs prep for her cystoscopy, which is being addressed by the above as well as IV steroids  · She will need dosed Benadryl and IV Steroids 1hr prior to procedure as well
· Follows with mast cell activation specialist at the [facility]  · Continue Benadryl 50 mg IM twice daily, Protonix 40 mg twice daily, pepcid twice daily, and steroids --> now s/p clint-operative prep and post need for IV medications  · Switched from IV formulations to PO (her home meds which she tolerates at home)  · Monitor symptoms  · Continue IVF  · Symptoms controlled on home meds, mostly
· Follows with mast cell activation specialist at the [facility]  · Continue Benadryl 50 mg IM twice daily, Protonix 40 mg twice daily, pepcid twice daily  · Monitor symptoms  · She needs prep for her cystoscopy, which is being addressed by the above as well as IV steroids  · She will need dosed Benadryl and IV Steroids 1hr prior to procedure as well - ordered as ONCE PRN
· Follows with mast cell activation specialist at the [facility]  · Continue Benadryl 50 mg IM twice daily, Protonix 40 mg twice daily, pepcid twice daily, and steroids --> now s/p clint-operative prep and post need for IV medications  · Switched from IV formulations to PO (her home meds which she tolerates at home)
· Follows with mast cell activation specialist at the [facility]  · Continue Benadryl 50 mg IM twice daily, Protonix 40 mg twice daily, pepcid twice daily  · Monitor symptoms  · She needs prep for her cystoscopy, which is being addressed by the above as well as IV steroids  · She will continue 24 hours post-procedure on intravenous meds  · Plan for her  to bring in home medications and start PO regimen tomorrow  · Continue IVF
· Follows with mast cell activation specialist at the [facility]  · Continue Benadryl 50 mg twice daily, Protonix 40 mg twice daily, pepcid twice daily  · Watch for any signs of reaction
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
· Patient reports bright red blood in urine 6/3, then reported pink tinged and she is having some symptoms of frequency and straining with urination  · Patient was scheduled for outpatient procedure, she typically needs significant prep 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  · Her urologist was contacted, see chart, plan for while inpatient as she missed her appointment and given her needs for prep/monitoring  · Plan for cystoscopy 6/8  · NPO at midnight wed night
· Patient reports bright red blood in urine 6/3, then reported pink tinged and she is having some symptoms of frequency and straining with urination  · Patient was scheduled for outpatient procedure, she typically needs significant prep 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  · Her urologist was contacted, see chart, plan for while inpatient as she missed her appointment and given her needs for prep/monitoring  · Plan for cystoscopy 6/8  · NPO at midnight wed night
· Patient reports bright red blood in urine 6/3, then reported pink tinged and she is having some symptoms of frequency and straining with urination  · Per patient has cystoscopy scheduled Tuesday with [facility]  Patient reports cystoscopy is being done because she possibly has spots of cancer, but also some deposits of the kidney (kidney stones vs heavy metals?)  On review of urology notes, diagnosis for cytoscopy is microscopic hematuria and kidney stones  · She was scheduled for appt in infusion center today for infusion of NS, steroids, protonix, pepcid and benadryl in anticipation of her cysto on Tuesday  · Patient does not feel well enough for discharge and is worried about dehydration and activation/worsening of her MAST cell surrounding her cystoscopy, requesting transfer for inpatient cystoscopy   I discussed with her this was scheduled as an outpatient procedure and typically will not be done as inpatient  · Urology consulted, awaiting their evaluation and if they would consider inpatient procedure given the above
· Stable, no bloody diarrhea  · Continue home budesonide 3 mg 3 times daily [nonformulary, patient does have medication with her] and prednisone 20 mg daily as needed  · On Stelara and omalizumab infusion treatment with IV hydration-next scheduled on 5/30/2023  · Patient missed all infusions this past week  · Continue Solu-Medrol --> switched to 20 mg q8hr, anticipate 1-2 days of IV steroids and then switch to oral regimen per GI recommendations
· Stable, no bloody diarrhea  · Continue home budesonide 3 mg 3 times daily [nonformulary, patient does have medication with her] and prednisone 20 mg daily as needed  · On Stelara and omalizumab infusion treatment with IV hydration-next scheduled on 5/30/2023  · Patient missed all infusions this past week  · Continue Solu-Medrol --> switched to 20 mg q8hr, anticipate 24 more hours of IV steroids then GI will re-evaluate for further planning
· Stable, no bloody diarrhea  · On Stelara and omalizumab infusion treatment as outpatient  · Patient missed all infusions past week prior to admission  · She reports taking budesonide 3 mg 3 times daily at home    · GI consulted, appreciate  · She completed prior IV course for GI, now continuing for her MAST prep/post-procedure  · She can resume PO steroids on discharge - plan to resume PO 6/9  · Continues to have some abdominal pain  · 6/7: c/o pain different than usual, reminds her of prior pancreatitis pain, lipase wnl
· Stable, no bloody diarrhea  · On Stelara and omalizumab infusion treatment as outpatient  · Patient missed all infusions past week prior to admission  · She reports taking budesonide 3 mg 3 times daily at home previously but now states she was most recently taking prednisone, unclear what dose but seems like most recent was 40 mg twice daily  · GI consulted, appreciate  · She completed prior IV course for GI  · She can resume PO steroids on discharge   · Continues to have some abdominal pain, had been variable for days  · 6/11: patient c/o significant abdominal pain 10/10, worsened overnight
· Stable, no bloody diarrhea  · On Stelara and omalizumab infusion treatment as outpatient  · Patient missed all infusions past week prior to admission  · She reports taking budesonide 3 mg 3 times daily at home previously but now states she was most recently taking prednisone, unclear what dose but seems like most recent was 40 mg twice daily  · GI consulted, appreciate  · She completed prior IV course for GI  · She can resume PO steroids on discharge   · Continues to have some abdominal pain, variable location
· Stable, no bloody diarrhea  · On Stelara and omalizumab infusion treatment as outpatient  · Patient missed all infusions past week prior to admission  · She reports taking budesonide 3 mg 3 times daily at home previously but now states she was most recently taking prednisone, unclear what dose but seems like most recent was 40 mg twice daily  · GI consulted, appreciate  · She completed prior IV course for GI  · She can resume PO steroids on discharge - plan to resume PO 6/9, see below under MAST cell  · Continues to have some abdominal pain, variable location
· Stable, no bloody diarrhea  · On Stelara and omalizumab infusion treatment as outpatient  · Patient missed all infusions past week prior to admission  · She reports taking budesonide 3 mg 3 times daily at home previously but now states she was most recently taking prednisone, unclear what dose but seems like most recent was 40 mg twice daily  · GI consulted, appreciate  · She completed prior IV course for GI  · Still reporting some nausea and mild abdominal pain however tolerating full diet  · Was on prednisone 40 twice daily reports abdominal pain with the high dose of steroids we will switch to 20 every 8h
· Stable, no bloody diarrhea  · On Stelara and omalizumab infusion treatment as outpatient  · Patient missed all infusions past week prior to admission  · She reports taking budesonide 3 mg 3 times daily at home previously but now states she was most recently taking prednisone, unclear what dose but seems like most recent was 40 mg twice daily  · GI consulted, appreciate  · She completed prior IV course for GI  · Still reporting some nausea and mild abdominal pain however tolerating full diet  · Was on prednisone 40 twice daily reports abdominal pain with the high dose of steroids we will switch to 20 every 8h - contineu for 3 more days and switch to BID
· Stable, no bloody diarrhea  · On Stelara and omalizumab infusion treatment as outpatient  · Patient missed all infusions this past week prior to admission  · She reports taking budesonide 3 mg 3 times daily at home    · GI consulted, appreciate  · She completed prior IV course for GI, now continuing for her MAST prep for cystoscopy   · She can resume PO steroids on discharge  · Continues to have some abdominal pain  · 6/7: c/o pain different than usual, reminds her of prior pancreatitis pain, will check lipase to r/o
· Stable, no bloody diarrhea  · On Stelara and omalizumab infusion treatment as outpatient  · Patient missed all infusions this past week prior to admission  · She reports taking budesonide 3 mg 3 times daily at home    · GI consulted, appreciate  · She completed prior IV course for GI, now continuing for her MAST prep for cystoscopy   · She can resume PO steroids on discharge  · Patient states her symptoms are improved, some of the most stable she has felt in awhile -- continues mild abd pain
· Stable, no bloody diarrhea  · On Stelara and omalizumab infusion treatment as outpatient  · Patient missed all infusions this past week prior to admission  · She reports taking budesonide 3 mg 3 times daily at home  She brought her home medication which she has been taking and is now discontinued    · GI consulted, appreciate  · She completed prior IV course steroids and GI recommended switching to PO, however due to her corn allergy she has home PO steroids she must use - she did not have her  bring them in  · IV steroids resumed 6/4  · Patient states her symptoms are continuing, with abd pain since restarting diet, does continue to tolerate diet though, just not well
· Stable, no bloody diarrhea  · On Stelara and omalizumab infusion treatment with IV hydration-next scheduled on 5/30/2023  · Patient missed all infusions this past week  · She reports taking budesonide 3 mg 3 times daily at home  She brought her home medication which she has been taking and is now discontinued  · Received Solu-Medrol through today --> starting tomorrow the patient is to resume her home prednisone  The patient states that she does not want to use hospital formulary and would like to use her home medication  She is anticipated to be discharged home tomorrow 
· Stable, no bloody diarrhea  · On Stelara and omalizumab infusion treatment with IV hydration-next scheduled on 5/30/2023  · Patient missed all infusions this past week  · She reports taking budesonide 3 mg 3 times daily at home  She brought her home medication which she has been taking and is now discontinued  · Was re-started on IV steroids yesterday  GI had recommended her to resume her PO steroids yesterday but patient did not have her  bring her meds from home and she is unable to take meds supplied here due to corn allergy  She is scheduled for appt in infusion center tomorrow for  infusion of NS, steroids, protonix, pepcid and benadryl in anticipation of her cysto on Tuesday 
· Stable, no bloody diarrhea  · On Stelara and omalizumab infusion treatment with IV hydration-next scheduled on 5/30/2023  · Patient missed all infusions this past week  · She reports taking budesonide 3 mg 3 times daily at home  She brought her home medication which she has been taking and is now discontinued  · Was started on IV steroids yesterday  Per GI, plan was to transition to PO steroids today, she did not receive PO dose yet as she needs to take her own home supple due to corn allergy and her  has not brought it yet  Patient complaining of increased abdominal pain overnight  Currently lying in bed with ice packs on abdomen and head 
· TSH WNL 1 889  · Continue levothyroxine 100 mcg daily
well appearing

## 2023-06-13 NOTE — NURSING NOTE
AVS printed and discharge instructions reviewed with Patient and Spouse  Patient denied having any questions  IV discontinued  Continuous monitoring device removed from Patient and placed back in charging station  Patient dressed, belongings gathered, and Spouse accompanied Patient out to main exit  Patient discharged

## 2023-06-13 NOTE — CASE MANAGEMENT
Case Management Discharge Planning Note    Patient name Lise Dunne  Location /827-34 MRN [id number]  : 1975 Date 2023       Current Admission Date: 2023  Current Admission Diagnosis:Crohn's colitis [facility])   Patient Active Problem List    Diagnosis Date Noted   • Bladder tumor 2023   • Seizure ([facility] [address] )    • Immunosuppression due to chronic steroid use ([facility] ) 2022   • Atrial tachycardia ([facility] ) 2022   • Nephrolithiasis 2021   • Anaphylactic reaction 2021   • Intractable nausea and vomiting 2021   • Heart palpitations 2021   • Inflammatory bowel disease 2021   • Current chronic use of systemic steroids 2021   • MS (multiple sclerosis) ([facility] ) 2021   • Migraine    • Overweight (BMI 25 0-29 9) 2021   • Anorexia 10/17/2020   • Crohn's colitis ([facility] ) 10/17/2020   • Mild protein-calorie malnutrition ([facility] ) 2019   • Constipation 09/10/2019   • Mass of left axilla 2019   • Immunosuppressed status ([facility] ) 2019   • Thrombocytosis 2019   • Proctitis 2019   • Thrush 2019   • Chronic back pain 2019   • Primary localized osteoarthrosis of ankle and foot 2018   • Fibromyalgia 2017   • CHF (congestive heart failure) ([facility] ) 2017   • Meniere's disease 2017   • Leukocytosis 09/10/2017   • Hypomagnesemia 2017   • Generalized anxiety disorder 2017   • Vomiting and diarrhea 2017   • SIRS (systemic inflammatory response syndrome) ([facility] ) 2017   • Vitamin D deficiency 2017   • Hypokalemia 2017   • Throat tightness 2017   • Abdominal pain 2017   • Headache 2017   • Disorder of synovium 2017   • Chondromalacia 2017   • Chronic idiopathic urticaria 2017   • 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   • Hypothyroidism (acquired) 2014      LOS (days): 15  Geometric Mean LOS (GMLOS) (days): 4 20  Days to GMLOS:-11 OBJECTIVE:  Risk of Unplanned Readmission Score: 12 73         Current admission status: Inpatient   Preferred Pharmacy:   [address] [facility] [address]  [address] [facility]  [address]  [facility] [address]  Phone: [phone] Fax: [phone] [address], [address] [place] [address]  Phone: [phone] Fax: [phone]    [facility] [address] [facility], [address]  [address]  Phone: [phone] Fax: [phone]    Primary Care Provider: Landry Lucia DO    Primary Insurance: [address]  Secondary Insurance: [address] discharging home today  Nurse to review AVS, all follow up providers listed  [facility]  No other discharge needs noted  Spouse to transport home

## 2023-06-13 NOTE — CASE MANAGEMENT
Case Management Progress Note    Patient name Maco Peoples  Location /440-24 MRN [id number]  : 1975 Date 2023       LOS (days): 15  Geometric Mean LOS (GMLOS) (days): 4 20  Days to GMLOS:-10 9        OBJECTIVE:        Current admission status: Inpatient  Preferred Pharmacy:   [id number] [facility] [address]  [address] [facility]  [address]  [facility] [address]  Phone: [phone] Fax: [phone]    [address] [place] [address], [facility] [address]  Phone: [phone] Fax: [phone]    [facility] [address] [facility], [address]  [address]  Phone: [phone] Fax: [phone]    Primary Care Provider: Jona Green DO    Primary Insurance: [address]  Secondary Insurance: MEDICARE    PROGRESS NOTE: spoke with pt at bedside regarding extended length of stay in hospital and discharge needs  No current discharge needs  Pt states she has been up and moving  When asked about her appetite she states its good due to being on steroids  Pt states her  will transport her home if discharged  Provider Eligio Monte updated on same

## 2023-06-13 NOTE — TELEPHONE ENCOUNTER
Telephone call to pt  Reviewed provider's message with pt  They verbalized understanding  Pt was wondering when she could go swimming again  She is a   Pt is a Dr Francisco Slaughter patient

## 2023-06-13 NOTE — PLAN OF CARE
Problem: MOBILITY - ADULT  Goal: Maintain or return to baseline ADL function  Description: INTERVENTIONS:  -  Assess patient's ability to carry out ADLs; assess patient's baseline for ADL function and identify physical deficits which impact ability to perform ADLs (bathing, care of mouth/teeth, toileting, grooming, dressing, etc )  - Assess/evaluate cause of self-care deficits   - Assess range of motion  - Assess patient's mobility; develop plan if impaired  - Assess patient's need for assistive devices and provide as appropriate  - Encourage maximum independence but intervene and supervise when necessary  - Involve family in performance of ADLs  - Assess for home care needs following discharge   - Consider OT consult to assist with ADL evaluation and planning for discharge  - Provide patient education as appropriate  Outcome: Progressing  Goal: Maintains/Returns to pre admission functional level  Description: INTERVENTIONS:  - Perform BMAT or MOVE assessment daily    - Set and communicate daily mobility goal to care team and patient/family/caregiver  - Collaborate with rehabilitation services on mobility goals if consulted  - Perform Range of Motion 3-4 times a day  - Reposition patient every 2 hours    - Dangle patient 3 times a day  - Stand patient 3 times a day  - Ambulate patient 3 times a day  - Out of bed to chair 3 times a day   - Out of bed for meals 3 times a day  - Out of bed for toileting  - Record patient progress and toleration of activity level   Outcome: Progressing     Problem: GASTROINTESTINAL - ADULT  Goal: Minimal or absence of nausea and/or vomiting  Description: INTERVENTIONS:  - Administer IV fluids if ordered to ensure adequate hydration1  - Maintain NPO status until nausea and vomiting are resolved  - Nasogastric tube if ordered  - Administer ordered antiemetic medications as needed  - Provide nonpharmacologic comfort measures as appropriate  - Advance diet as tolerated, if ordered  - Consider nutrition services referral to assist patient with adequate nutrition and appropriate food choices  Outcome: Progressing  Goal: Maintains or returns to baseline bowel function  Description: INTERVENTIONS:  - Assess bowel function  - Encourage oral fluids to ensure adequate hydration  - Administer IV fluids if ordered to ensure adequate hydration  - Administer ordered medications as needed  - Encourage mobilization and activity  - Consider nutritional services referral to assist patient with adequate nutrition and appropriate food choices  Outcome: Progressing  Goal: Maintains adequate nutritional intake  Description: INTERVENTIONS:  - Monitor percentage of each meal consumed  - Identify factors contributing to decreased intake, treat as appropriate  - Assist with meals as needed  - Monitor I&O, weight, and lab values if indicated  - Obtain nutrition services referral as needed  Outcome: Progressing     Problem: METABOLIC, FLUID AND ELECTROLYTES - ADULT  Goal: Electrolytes maintained within normal limits  Description: INTERVENTIONS:  - Monitor labs and assess patient for signs and symptoms of electrolyte imbalances  - Administer electrolyte replacement as ordered  - Monitor response to electrolyte replacements, including repeat lab results as appropriate  - Instruct patient on fluid and nutrition as appropriate  Outcome: Progressing  Goal: Fluid balance maintained  Description: INTERVENTIONS:  - Monitor labs   - Monitor I/O and WT  - Instruct patient on fluid and nutrition as appropriate  - Assess for signs & symptoms of volume excess or deficit  Outcome: Progressing     Problem: PAIN - ADULT  Goal: Verbalizes/displays adequate comfort level or baseline comfort level  Description: Interventions:  - Encourage patient to monitor pain and request assistance  - Assess pain using appropriate pain scale  - Administer analgesics based on type and severity of pain and evaluate response  - Implement non-pharmacological measures as appropriate and evaluate response  - Consider cultural and social influences on pain and pain management  - Notify physician/advanced practitioner if interventions unsuccessful or patient reports new pain  Outcome: Progressing     Problem: INFECTION - ADULT  Goal: Absence or prevention of progression during hospitalization  Description: INTERVENTIONS:  - Assess and monitor for signs and symptoms of infection  - Monitor lab/diagnostic results  - Monitor all insertion sites, i e  indwelling lines, tubes, and drains  - Monitor endotracheal if appropriate and nasal secretions for changes in amount and color  - [place] appropriate cooling/warming therapies per order  - Administer medications as ordered  - Instruct and encourage patient and family to use good hand hygiene technique  - Identify and instruct in appropriate isolation precautions for identified infection/condition  Outcome: Progressing     Problem: SAFETY ADULT  Goal: Maintain or return to baseline ADL function  Description: INTERVENTIONS:  -  Assess patient's ability to carry out ADLs; assess patient's baseline for ADL function and identify physical deficits which impact ability to perform ADLs (bathing, care of mouth/teeth, toileting, grooming, dressing, etc )  - Assess/evaluate cause of self-care deficits   - Assess range of motion  - Assess patient's mobility; develop plan if impaired  - Assess patient's need for assistive devices and provide as appropriate  - Encourage maximum independence but intervene and supervise when necessary  - Involve family in performance of ADLs  - Assess for home care needs following discharge   - Consider OT consult to assist with ADL evaluation and planning for discharge  - Provide patient education as appropriate  Outcome: Progressing  Goal: Maintains/Returns to pre admission functional level  Description: INTERVENTIONS:  - Perform BMAT or MOVE assessment daily    - Set and communicate daily mobility goal to care team and patient/family/caregiver  - Collaborate with rehabilitation services on mobility goals if consulted  - Perform Range of Motion 3-4 times a day  - Reposition patient every 2 hours    - Dangle patient 3 times a day  - Stand patient 3 times a day  - Ambulate patient 3 times a day  - Out of bed to chair 3 times a day   - Out of bed for meals 3 times a day  - Out of bed for toileting  - Record patient progress and toleration of activity level   Outcome: Progressing  Goal: Patient will remain free of falls  Description: INTERVENTIONS:  - Educate patient/family on patient safety including physical limitations  - Instruct patient to call for assistance with activity   - Consult OT/PT to assist with strengthening/mobility   - Keep Call bell within reach  - Keep bed low and locked with side rails adjusted as appropriate  - Keep care items and personal belongings within reach  - Initiate and maintain comfort rounds  - Make Fall Risk Sign visible to staff  - Offer Toileting every 2 Hours, in advance of need  - Initiate/Maintain bed/ chair alarm  - Apply yellow socks and bracelet for high fall risk patients  - Consider moving patient to room near nurses station  Outcome: Progressing     Problem: DISCHARGE PLANNING  Goal: Discharge to home or other facility with appropriate resources  Description: INTERVENTIONS:  - Identify barriers to discharge w/patient and caregiver  - Arrange for needed discharge resources and transportation as appropriate  - Identify discharge learning needs (meds, wound care, etc )  - Arrange for interpretive services to assist at discharge as needed  - Refer to Case Management Department for coordinating discharge planning if the patient needs post-hospital services based on physician/advanced practitioner order or complex needs related to functional status, cognitive ability, or social support system  Outcome: Progressing     Problem: Knowledge Deficit  Goal: Patient/family/caregiver demonstrates understanding of disease process, treatment plan, medications, and discharge instructions  Description: Complete learning assessment and assess knowledge base  Interventions:  - Provide teaching at level of understanding  - Provide teaching via preferred learning methods  Outcome: Progressing     Problem: Nutrition/Hydration-ADULT  Goal: Nutrient/Hydration intake appropriate for improving, restoring or maintaining nutritional needs  Description: Monitor and assess patient's nutrition/hydration status for malnutrition  Collaborate with interdisciplinary team and initiate plan and interventions as ordered  Monitor patient's weight and dietary intake as ordered or per policy  Utilize nutrition screening tool and intervene as necessary  Determine patient's food preferences and provide high-protein, high-caloric foods as appropriate       INTERVENTIONS:  - Monitor oral intake, urinary output, labs, and treatment plans  - Assess nutrition and hydration status and recommend course of action  - Evaluate amount of meals eaten  - Assist patient with eating if necessary   - Allow adequate time for meals  - Recommend/ encourage appropriate diets, oral nutritional supplements, and vitamin/mineral supplements  - Order, calculate, and assess calorie counts as needed  - Recommend, monitor, and adjust tube feedings and TPN/PPN based on assessed needs  - Assess need for intravenous fluids  - Provide specific nutrition/hydration education as appropriate  - Include patient/family/caregiver in decisions related to nutrition  Outcome: Progressing

## 2023-06-13 NOTE — DISCHARGE SUMMARY
[address] [facility] [address]  Discharge- Jane Harvey 1975, 46 y o  female MRN: [id number]  Unit/Bed#: 584-32 Encounter: [id number]  Primary Care Provider: Cynthia Carson DO   Date and time admitted to hospital: 5/29/2023  2:12 AM    Bladder tumor  Assessment & Plan  · Outpatient follow-up with urology patient had been having prior urinary issues before admission, planned for outpatient cystoscopy  · Patient reported bright red blood in urine 6/3, then reported pink tinged and she is having some symptoms of frequency and straining with urination  · Patient was scheduled for outpatient procedure, for which she typically needs significant prep 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  · Her urologist was contacted, see chart, plan for while inpatient as she missed her appointment and given her needs for prep/monitoring  · S/p cystoscopy 6/8, which found likely bladder tumor consistent with TCC, underwent fulguration and biopsy  · Outpatient follow-up with urology    Seizure [facility])  Assessment & Plan  · Ativan as needed  · Not on any other seizure medications at home  · EEG completed  Patient had one of her typical events  No epileptic activity associated with the event   Consistent with psychogenic seizures, though seizures can be result of Mast cell disease  · No evidence further seizures   · Outpatient follow-up    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HCC)  Assessment & Plan  · Follows with mast cell activation specialist at [facility]  · Continue Benadryl 50 mg IM twice daily, Protonix 40 mg twice daily, pepcid twice daily, and steroids --> now s/p clint-operative prep and post need for IV medications  · Switched from IV formulations to PO (her home meds which she tolerates at home)      Hypothyroidism (acquired)  Assessment & Plan  · Continue Synthroid    SIRS (systemic inflammatory response syndrome) (HCC)  Assessment & Plan  SIRS, resolved, possibly due to Crohn's disease/Viral gastroenteritis; presented with WBC 13 25 and   Resolved SIRS         Vomiting and diarrhea  Assessment & Plan  Presented to ED with ongoing nonbloody nonbilious emesis and nonbloody diarrhea since EGD colonoscopy on 5/2/2023  · Stool C  difficile and PCR negative  · Met SIRS criteria on admission likely due to Crohn's vs viral gastroenteritis and resovled  Symptoms have since improved no further vomiting or diarrhea  Does report some nausea    * Crohn's colitis ([facility] [address] )  Assessment & Plan  · Stable, no bloody diarrhea  · On Stelara and omalizumab infusion treatment as outpatient  · Patient missed all infusions past week prior to admission  · She reports taking budesonide 3 mg 3 times daily at home previously but now states she was most recently taking prednisone, unclear what dose but seems like most recent was 40 mg twice daily  · GI consulted, appreciate  · She completed prior IV course for GI  · Still reporting some nausea and mild abdominal pain however tolerating full diet  · Was on prednisone 40 twice daily reports abdominal pain with the high dose of steroids we will switch to 20 every 8h - contineu for 3 more days and switch to BID      Medical Problems     Resolved Problems  Date Reviewed: 6/12/2023          Resolved    Hyponatremia 5/31/2023     Resolved by  Marco Major PA-C    Decreased oral intake 6/7/2023     Resolved by  Amanda Dominguez DO    Hematuria 6/9/2023     Resolved by  Amanda Dominguez DO        Discharging Physician / Practitioner: Terrence Ernst PA-C  PCP: Stephany Kathleen DO  Admission Date:   Admission Orders (From admission, onward)     Ordered        05/29/23 0958  Inpatient Admission  Once            05/29/23 0602  Place in Observation  Once                      Discharge Date: 06/13/23    Consultations During Hospital Stay:  · GI  · Urology   · PT  · OT    Procedures Performed:   · Cystoscopy 6/8    Significant Findings / Test Results:   · CT head - 5/29/23  · No acute intracranial abnormality  Incidental Findings:   · None  · I reviewed the above mentioned incidental findings with the patient and/or family and they expressed understanding  Test Results Pending at Discharge (will require follow up): · Tissue Biopsy during cystoscopy      Outpatient Tests Requested:  · None    Complications:  None    Reason for Admission: Decreased oral intake     Hospital Course:   Yoselin Delgado is a 46 y o  female patient with PMH of mast cell activation syndrome, hypothymism, Crohn's disease who originally presented to the hospital on 5/29/2023 due to nausea and vomiting with decreased oral intake  Diet advanced as tolerated  Also was hyponatremia on admission - improved  Initially started on home budesonide for chrons however transitioned to IV steroids and GI consulted to help manage chron's flare  She was given IV hydration throughout stay  Stool pathogens and c  Diff negative  Continue steroid taper and weaned to prednisone 20 mg TID  Also apparently had seizure during admission - EEG ordered, normal EEG - based on videography and EEG diagnotic for psychogenic non-epileptic seizure  Overnight 6/3 noted bright red blood in urine  Had an OP cystoscopy scheduled that was moved to inpatient  During cystoscopy on 6/8 was found to have small bladder tumor that was removed and send for pathology  Overall improved as tolerating oral intake, no vomiting, and diarrhea improved  Cleared for discharge home  Please see above list of diagnoses and related plan for additional information  Condition at Discharge: fair    Discharge Day Visit / Exam:   Subjective:  Seen and examined  Patient is tearful  Went over her cystoscopy results and recommended follow up as well as discharge medications   patient agreeable for dc  Vitals: Blood Pressure: 115/79 (06/13/23 0700)  Pulse: 88 (06/13/23 0700)  Temperature: 98 6 °F (37 °C) (06/13/23 0700)  Temp Source: Oral (06/12/23 8975)  Respirations: 16 (06/13/23 "0700)  Height: 5' 5\" (165 1 cm) (05/29/23 1054)  Weight - Scale: 69 kg (152 lb 1 9 oz) (06/13/23 0600)  SpO2: 93 % (06/13/23 0700)  Exam:   Physical Exam  Vitals and nursing note reviewed  Constitutional:       General: She is not in acute distress  Appearance: Normal appearance  HENT:      Head: Normocephalic and atraumatic  Nose: No congestion  Mouth/Throat:      Mouth: Mucous membranes are moist    Eyes:      Conjunctiva/sclera: Conjunctivae normal    Cardiovascular:      Rate and Rhythm: Normal rate and regular rhythm  Pulses: Normal pulses  Heart sounds: Normal heart sounds  No murmur heard  Pulmonary:      Effort: Pulmonary effort is normal  No respiratory distress  Breath sounds: Normal breath sounds  Abdominal:      General: Bowel sounds are normal       Palpations: Abdomen is soft  Tenderness: There is abdominal tenderness  Musculoskeletal:         General: Normal range of motion  Right lower leg: No edema  Left lower leg: No edema  Skin:     General: Skin is warm and dry  Neurological:      Mental Status: She is alert and oriented to person, place, and time  Psychiatric:         Mood and Affect: Mood is anxious  Affect is tearful  Discussion with Family: Patient declined call to   Discharge instructions/Information to patient and family:   See after visit summary for information provided to patient and family  Provisions for Follow-Up Care:  See after visit summary for information related to follow-up care and any pertinent home health orders  Disposition:   Home    Planned Readmission: None - remains at high risk for re-admission      Discharge Statement:  I spent 45 minutes discharging the patient  This time was spent on the day of discharge  I had direct contact with the patient on the day of discharge   Greater than 50% of the total time was spent examining patient, answering all patient questions, arranging " and discussing plan of care with patient as well as directly providing post-discharge instructions  Additional time then spent on discharge activities  Discharge Medications:  See after visit summary for reconciled discharge medications provided to patient and/or family        **Please Note: This note may have been constructed using a voice recognition system**

## 2023-06-13 NOTE — TELEPHONE ENCOUNTER
----- Message from Phong Rosales MD sent at 6/13/2023  2:02 PM EDT -----  Please let her know the good news that the lesion that I biopsied was not cancer  It is completely benign    Dr Lauren Mosley

## 2023-06-14 NOTE — TELEPHONE ENCOUNTER
Pt still admitted to hospital   Will continue to monitor for discharge:    Message from Rhys Cadet MD sent at 6/8/2023  8:17 AM EDT -----  Patient will need cystoscopy in 3 months  I removed a small superficial bladder tumor today in the operating room  Await the pathology  Please call patient to arrange  She is [facility] inpatient at [facility]

## 2023-06-14 NOTE — UTILIZATION REVIEW
NOTIFICATION OF ADMISSION DISCHARGE   This is a Notification of Discharge from [facility]  Please be advised that this patient has been discharge from our facility  Below you will find the admission and discharge date and time including the patient’s disposition  UTILIZATION REVIEW CONTACT:  Pita Lehman  Utilization   Network Utilization Review Department  Phone: [phone] x carefully listen to the prompts  All voicemails are confidential   Email: [email or web address]     ADMISSION INFORMATION  PRESENTATION DATE: 5/29/2023  2:12 AM    INPATIENT ADMISSION DATE: 5/29/23  9:58 AM   DISCHARGE DATE: 6/13/2023  7:15 PM   DISPOSITION:Home/Self Care    IMPORTANT INFORMATION:  Send all requests for admission clinical reviews, approved or denied determinations and any other requests to dedicated fax number below belonging to the campus where the patient is receiving treatment   List of dedicated fax numbers:  [address] DENIALS (Administrative/Medical Necessity) [phone]   [address] (Maternity/NICU/Pediatrics) [phone]   [facility] [phone]   [facility] 87 [phone]   [address] [phone]   [address] [facility] [phone]   [facility] [phone]   [address] [place] [address] 119 [phone]   [facility]  [phone]   [facility] [phone]   [facility] [address] [facility] [phone]

## 2023-06-15 ENCOUNTER — HOSPITAL ENCOUNTER (OUTPATIENT)
Dept: INFUSION CENTER | Facility: HOSPITAL | Age: 48
Discharge: HOME | End: 2023-06-15
Attending: INTERNAL MEDICINE
Payer: COMMERCIAL

## 2023-06-15 DIAGNOSIS — E44.1 MILD PROTEIN-CALORIE MALNUTRITION (HCC): ICD-10-CM

## 2023-06-15 DIAGNOSIS — R11.10 VOMITING AND DIARRHEA: ICD-10-CM

## 2023-06-15 DIAGNOSIS — R19.7 VOMITING AND DIARRHEA: ICD-10-CM

## 2023-06-15 DIAGNOSIS — R63.0 ANOREXIA: Primary | ICD-10-CM

## 2023-06-15 PROCEDURE — 96361 HYDRATE IV INFUSION ADD-ON: CPT

## 2023-06-15 PROCEDURE — C9113 INJ PANTOPRAZOLE SODIUM, VIA: HCPCS | Performed by: INTERNAL MEDICINE

## 2023-06-15 PROCEDURE — 96375 TX/PRO/DX INJ NEW DRUG ADDON: CPT

## 2023-06-15 PROCEDURE — 96365 THER/PROPH/DIAG IV INF INIT: CPT

## 2023-06-15 RX ORDER — LORAZEPAM 2 MG/ML
1 INJECTION INTRAMUSCULAR ONCE
Status: CANCELLED
Start: 2023-06-15 | End: 2023-06-15

## 2023-06-15 RX ORDER — LORAZEPAM 2 MG/ML
1 INJECTION INTRAMUSCULAR ONCE
Status: CANCELLED
Start: 2023-06-16 | End: 2023-06-16

## 2023-06-15 RX ORDER — METHYLPREDNISOLONE SODIUM SUCCINATE 125 MG/2ML
60 INJECTION, POWDER, LYOPHILIZED, FOR SOLUTION INTRAMUSCULAR; INTRAVENOUS ONCE
Status: CANCELLED
Start: 2023-06-16 | End: 2023-06-16

## 2023-06-15 RX ORDER — PANTOPRAZOLE SODIUM 40 MG/10ML
40 INJECTION, POWDER, LYOPHILIZED, FOR SOLUTION INTRAVENOUS ONCE
Status: COMPLETED | OUTPATIENT
Start: 2023-06-15 | End: 2023-06-15

## 2023-06-15 RX ORDER — PANTOPRAZOLE SODIUM 40 MG/10ML
40 INJECTION, POWDER, LYOPHILIZED, FOR SOLUTION INTRAVENOUS ONCE
Status: CANCELLED
Start: 2023-06-16 | End: 2023-06-16

## 2023-06-15 RX ORDER — METHYLPREDNISOLONE SODIUM SUCCINATE 125 MG/2ML
60 INJECTION, POWDER, LYOPHILIZED, FOR SOLUTION INTRAMUSCULAR; INTRAVENOUS ONCE
Status: COMPLETED | OUTPATIENT
Start: 2023-06-15 | End: 2023-06-15

## 2023-06-15 RX ORDER — LORAZEPAM 2 MG/ML
1 INJECTION INTRAMUSCULAR ONCE
Status: COMPLETED | OUTPATIENT
Start: 2023-06-15 | End: 2023-06-15

## 2023-06-15 RX ADMIN — SODIUM CHLORIDE 1500 ML: 0.9 INJECTION, SOLUTION INTRAVENOUS at 12:48

## 2023-06-15 RX ADMIN — LORAZEPAM 1 MG: 2 INJECTION INTRAMUSCULAR; INTRAVENOUS at 14:28

## 2023-06-15 RX ADMIN — METHYLPREDNISOLONE SODIUM SUCCINATE 60 MG: 125 INJECTION, POWDER, FOR SOLUTION INTRAMUSCULAR; INTRAVENOUS at 13:44

## 2023-06-15 RX ADMIN — FAMOTIDINE 40 MG: 10 INJECTION, SOLUTION INTRAVENOUS at 14:01

## 2023-06-15 RX ADMIN — PANTOPRAZOLE SODIUM 40 MG: 40 INJECTION, POWDER, FOR SOLUTION INTRAVENOUS at 13:56

## 2023-06-15 RX ADMIN — DIPHENHYDRAMINE HYDROCHLORIDE 50 MG: 50 INJECTION, SOLUTION INTRAMUSCULAR; INTRAVENOUS at 12:56

## 2023-06-15 NOTE — PROGRESS NOTES
Pt arrived in infusion , upset, she asked us to contact dr Perez Nicely    notified and order recieved

## 2023-06-15 NOTE — PLAN OF CARE
Problem: Potential for Falls  Goal: Patient will remain free of falls  Description: INTERVENTIONS:  - Educate patient/family on patient safety including physical limitations  - Instruct patient to call for assistance with activity   - Consult OT/PT to assist with strengthening/mobility   - Keep Call bell within reach      - Consider moving patient to room near nurses station  Outcome: Progressing     Problem: Knowledge Deficit  Goal: Patient/family/caregiver demonstrates understanding of disease process, treatment plan, medications, and discharge instructions  Description: Complete learning assessment and assess knowledge base    Interventions:  - Provide teaching at level of understanding  - Provide teaching via preferred learning methods  Outcome: Progressing

## 2023-06-16 ENCOUNTER — TELEPHONE (OUTPATIENT)
Dept: UROLOGY | Facility: MEDICAL CENTER | Age: 48
End: 2023-06-16

## 2023-06-16 ENCOUNTER — TELEPHONE (OUTPATIENT)
Dept: FAMILY MEDICINE CLINIC | Facility: CLINIC | Age: 48
End: 2023-06-16

## 2023-06-16 ENCOUNTER — HOSPITAL ENCOUNTER (OUTPATIENT)
Dept: INFUSION CENTER | Facility: HOSPITAL | Age: 48
End: 2023-06-16
Attending: INTERNAL MEDICINE
Payer: COMMERCIAL

## 2023-06-16 VITALS
HEART RATE: 99 BPM | TEMPERATURE: 98.1 F | OXYGEN SATURATION: 97 % | DIASTOLIC BLOOD PRESSURE: 74 MMHG | RESPIRATION RATE: 18 BRPM | SYSTOLIC BLOOD PRESSURE: 132 MMHG

## 2023-06-16 DIAGNOSIS — R63.0 ANOREXIA: Primary | ICD-10-CM

## 2023-06-16 DIAGNOSIS — E44.1 MILD PROTEIN-CALORIE MALNUTRITION (HCC): ICD-10-CM

## 2023-06-16 DIAGNOSIS — R19.7 VOMITING AND DIARRHEA: ICD-10-CM

## 2023-06-16 DIAGNOSIS — R11.10 VOMITING AND DIARRHEA: ICD-10-CM

## 2023-06-16 PROCEDURE — 96361 HYDRATE IV INFUSION ADD-ON: CPT

## 2023-06-16 PROCEDURE — 96365 THER/PROPH/DIAG IV INF INIT: CPT

## 2023-06-16 PROCEDURE — 96375 TX/PRO/DX INJ NEW DRUG ADDON: CPT

## 2023-06-16 PROCEDURE — C9113 INJ PANTOPRAZOLE SODIUM, VIA: HCPCS | Performed by: INTERNAL MEDICINE

## 2023-06-16 RX ORDER — METHYLPREDNISOLONE SODIUM SUCCINATE 125 MG/2ML
60 INJECTION, POWDER, LYOPHILIZED, FOR SOLUTION INTRAMUSCULAR; INTRAVENOUS ONCE
Status: CANCELLED
Start: 2023-06-19 | End: 2023-06-17

## 2023-06-16 RX ORDER — PANTOPRAZOLE SODIUM 40 MG/10ML
40 INJECTION, POWDER, LYOPHILIZED, FOR SOLUTION INTRAVENOUS ONCE
Status: CANCELLED
Start: 2023-06-19 | End: 2023-06-17

## 2023-06-16 RX ORDER — LORAZEPAM 2 MG/ML
1 INJECTION INTRAMUSCULAR ONCE AS NEEDED
Status: CANCELLED
Start: 2023-06-17

## 2023-06-16 RX ORDER — PANTOPRAZOLE SODIUM 40 MG/10ML
40 INJECTION, POWDER, LYOPHILIZED, FOR SOLUTION INTRAVENOUS ONCE
Status: COMPLETED | OUTPATIENT
Start: 2023-06-16 | End: 2023-06-16

## 2023-06-16 RX ORDER — LORAZEPAM 2 MG/ML
1 INJECTION INTRAMUSCULAR ONCE
Status: CANCELLED
Start: 2023-06-17 | End: 2023-06-17

## 2023-06-16 RX ORDER — LORAZEPAM 2 MG/ML
1 INJECTION INTRAMUSCULAR ONCE
Status: COMPLETED | OUTPATIENT
Start: 2023-06-16 | End: 2023-06-16

## 2023-06-16 RX ORDER — LORAZEPAM 2 MG/ML
1 INJECTION INTRAMUSCULAR ONCE
Status: CANCELLED
Start: 2023-06-16 | End: 2023-06-16

## 2023-06-16 RX ORDER — METHYLPREDNISOLONE SODIUM SUCCINATE 125 MG/2ML
60 INJECTION, POWDER, LYOPHILIZED, FOR SOLUTION INTRAMUSCULAR; INTRAVENOUS ONCE
Status: COMPLETED | OUTPATIENT
Start: 2023-06-16 | End: 2023-06-16

## 2023-06-16 RX ADMIN — DIPHENHYDRAMINE HYDROCHLORIDE 50 MG: 50 INJECTION, SOLUTION INTRAMUSCULAR; INTRAVENOUS at 10:10

## 2023-06-16 RX ADMIN — LORAZEPAM 1 MG: 2 INJECTION INTRAMUSCULAR; INTRAVENOUS at 10:52

## 2023-06-16 RX ADMIN — SODIUM CHLORIDE 1500 ML: 0.9 INJECTION, SOLUTION INTRAVENOUS at 10:09

## 2023-06-16 RX ADMIN — METHYLPREDNISOLONE SODIUM SUCCINATE 60 MG: 125 INJECTION, POWDER, FOR SOLUTION INTRAMUSCULAR; INTRAVENOUS at 10:57

## 2023-06-16 RX ADMIN — PANTOPRAZOLE SODIUM 40 MG: 40 INJECTION, POWDER, FOR SOLUTION INTRAVENOUS at 11:03

## 2023-06-16 RX ADMIN — FAMOTIDINE 40 MG: 10 INJECTION, SOLUTION INTRAVENOUS at 11:07

## 2023-06-16 NOTE — TELEPHONE ENCOUNTER
Hi, this is Tate Tabor calling birthdate is [birth year]  I was discharged from [address] [place] [address] on Tuesday night and they said they had called in all my meds including a new pain Med tramadol for me and several of them were not called in  Yesterday my insurance company  called and had tried to reach out to the hospitalists that was there which I told her would be quite difficult because the way they changed hospitalists every day, every other day  So today I am still without any pain medication and today she is off so I'm trying to get a hold of doctor Alisson Burnette to see if he can call in an order for tramadol for pain  Fifteen milligrams, even if it's just enough to get me to my appointment next Friday  They had me on the diclofenac, which is like the voltaren  However, we found out that has a corn corn coating on it and corn allergy  And when I was taking it, I was having a reaction from it as well as, you know, becoming severely dehydrated from taking it  So that's the switch from the tramadol to this tramadol  If you have any questions, please give me a call  Five, seven, zero, seven, seven, eight, six, three, nine, one  I know it's kind of last minute  I'm hoping this can be done before the weekend because I don't have anything at all for pain and I really don't want to go back into the just for pain medicine  So the name is Jayce Pablo  Two xvppit oro, ibeoend three  Phone number is five, seven, zero, seven, seven, eight, six, three, nine, one  And again, it's for tramadol  Fifty milligrams  Thank you

## 2023-06-16 NOTE — TELEPHONE ENCOUNTER
----- Message from Isaiah Spears MD sent at 6/8/2023  8:28 AM EDT -----  Beatriz Welsh  Please arrange pt follow up in 3 months so that I can do paperwork for another cysto pending path from today  I can see her to follow up on path from today or Rc Rodrigo can if he or she prefers, otherwise I can see  RML  ----- Message -----  From: López Lacey MD  Sent: 6/8/2023   8:17 AM EDT  To: Isaiah Spears MD; #    Patient will need cystoscopy in 3 months  I removed a small superficial bladder tumor today in the operating room  Await the pathology  Please call patient to arrange  She is currently inpatient at [address] [facility]

## 2023-06-16 NOTE — TELEPHONE ENCOUNTER
Patient is looking for script of tramadol to bridge her until she comes in for appt for tcm next Friday

## 2023-06-16 NOTE — TELEPHONE ENCOUNTER
Sent pt [employer] message yesterday regarding biopsy results and request to contact the office to schedule OV for cystoscopy in the OR in 3 months with either Dr Zenaida Bermeo or Dr Chun Mt

## 2023-06-17 ENCOUNTER — HOSPITAL ENCOUNTER (OUTPATIENT)
Dept: INFUSION CENTER | Facility: HOSPITAL | Age: 48
Discharge: HOME | End: 2023-06-17
Attending: INTERNAL MEDICINE

## 2023-06-17 ENCOUNTER — TELEPHONE (OUTPATIENT)
Dept: INFUSION CENTER | Facility: HOSPITAL | Age: 48
End: 2023-06-17

## 2023-06-19 ENCOUNTER — HOSPITAL ENCOUNTER (OUTPATIENT)
Dept: INFUSION CENTER | Facility: HOSPITAL | Age: 48
Discharge: HOME | End: 2023-06-19
Attending: INTERNAL MEDICINE
Payer: COMMERCIAL

## 2023-06-19 VITALS
RESPIRATION RATE: 18 BRPM | HEART RATE: 126 BPM | OXYGEN SATURATION: 95 % | DIASTOLIC BLOOD PRESSURE: 86 MMHG | SYSTOLIC BLOOD PRESSURE: 116 MMHG | TEMPERATURE: 99 F

## 2023-06-19 DIAGNOSIS — E44.1 MILD PROTEIN-CALORIE MALNUTRITION (HCC): ICD-10-CM

## 2023-06-19 DIAGNOSIS — L50.1 CHRONIC IDIOPATHIC URTICARIA: Primary | ICD-10-CM

## 2023-06-19 DIAGNOSIS — R11.10 VOMITING AND DIARRHEA: ICD-10-CM

## 2023-06-19 DIAGNOSIS — R63.0 ANOREXIA: ICD-10-CM

## 2023-06-19 DIAGNOSIS — R19.7 VOMITING AND DIARRHEA: ICD-10-CM

## 2023-06-19 PROCEDURE — C9113 INJ PANTOPRAZOLE SODIUM, VIA: HCPCS | Performed by: INTERNAL MEDICINE

## 2023-06-19 RX ORDER — EPINEPHRINE 1 MG/ML
0.3 INJECTION, SOLUTION, CONCENTRATE INTRAVENOUS
Status: CANCELLED | OUTPATIENT
Start: 2023-06-19

## 2023-06-19 RX ORDER — PANTOPRAZOLE SODIUM 40 MG/10ML
40 INJECTION, POWDER, LYOPHILIZED, FOR SOLUTION INTRAVENOUS ONCE
Status: COMPLETED | OUTPATIENT
Start: 2023-06-19 | End: 2023-06-19

## 2023-06-19 RX ORDER — LORAZEPAM 2 MG/ML
1 INJECTION INTRAMUSCULAR ONCE AS NEEDED
Status: CANCELLED
Start: 2023-06-20

## 2023-06-19 RX ORDER — LORAZEPAM 2 MG/ML
1 INJECTION INTRAMUSCULAR ONCE AS NEEDED
Status: COMPLETED | OUTPATIENT
Start: 2023-06-19 | End: 2023-06-19

## 2023-06-19 RX ORDER — METHYLPREDNISOLONE SODIUM SUCCINATE 125 MG/2ML
60 INJECTION, POWDER, LYOPHILIZED, FOR SOLUTION INTRAMUSCULAR; INTRAVENOUS ONCE
Status: COMPLETED | OUTPATIENT
Start: 2023-06-19 | End: 2023-06-19

## 2023-06-19 RX ORDER — PANTOPRAZOLE SODIUM 40 MG/10ML
40 INJECTION, POWDER, LYOPHILIZED, FOR SOLUTION INTRAVENOUS ONCE
Status: CANCELLED
Start: 2023-06-20 | End: 2023-06-20

## 2023-06-19 RX ORDER — METHYLPREDNISOLONE SODIUM SUCCINATE 125 MG/2ML
60 INJECTION, POWDER, LYOPHILIZED, FOR SOLUTION INTRAMUSCULAR; INTRAVENOUS ONCE
Status: CANCELLED
Start: 2023-06-20 | End: 2023-06-20

## 2023-06-19 RX ADMIN — METHYLPREDNISOLONE SODIUM SUCCINATE 60 MG: 125 INJECTION, POWDER, FOR SOLUTION INTRAMUSCULAR; INTRAVENOUS at 13:35

## 2023-06-19 RX ADMIN — PANTOPRAZOLE SODIUM 40 MG: 40 INJECTION, POWDER, FOR SOLUTION INTRAVENOUS at 14:40

## 2023-06-19 RX ADMIN — LORAZEPAM 1 MG: 2 INJECTION INTRAMUSCULAR; INTRAVENOUS at 12:43

## 2023-06-19 RX ADMIN — FAMOTIDINE 40 MG: 10 INJECTION, SOLUTION INTRAVENOUS at 13:48

## 2023-06-19 RX ADMIN — SODIUM CHLORIDE 1500 ML: 0.9 INJECTION, SOLUTION INTRAVENOUS at 12:15

## 2023-06-19 RX ADMIN — DIPHENHYDRAMINE HYDROCHLORIDE 50 MG: 50 INJECTION, SOLUTION INTRAMUSCULAR; INTRAVENOUS at 12:59

## 2023-06-19 NOTE — ASSESSMENT & PLAN NOTE
Detail Level: Generalized Patient has had progressively worsening dysphagia secondary to a tonsillectomy performed in April 2019  She is having difficulty with oral food and fluid intake  She was offered an ENT evaluation as an outpatient but is requesting inpatient ENT evaluation  For this reason, she was transferred to [facility] because [address] [place] [address] does not have access to ENT consult services  ENT consult has been placed  The patient will also need a video barium swallow test performed  Detail Level: Detailed

## 2023-06-20 ENCOUNTER — HOSPITAL ENCOUNTER (OUTPATIENT)
Dept: INFUSION CENTER | Facility: HOSPITAL | Age: 48
Discharge: HOME | End: 2023-06-20
Attending: INTERNAL MEDICINE
Payer: COMMERCIAL

## 2023-06-20 VITALS — TEMPERATURE: 99 F

## 2023-06-20 DIAGNOSIS — R19.7 VOMITING AND DIARRHEA: ICD-10-CM

## 2023-06-20 DIAGNOSIS — R11.10 VOMITING AND DIARRHEA: ICD-10-CM

## 2023-06-20 DIAGNOSIS — R63.0 ANOREXIA: Primary | ICD-10-CM

## 2023-06-20 DIAGNOSIS — E44.1 MILD PROTEIN-CALORIE MALNUTRITION (HCC): ICD-10-CM

## 2023-06-20 PROCEDURE — 96361 HYDRATE IV INFUSION ADD-ON: CPT

## 2023-06-20 PROCEDURE — 96365 THER/PROPH/DIAG IV INF INIT: CPT

## 2023-06-20 PROCEDURE — 96367 TX/PROPH/DG ADDL SEQ IV INF: CPT

## 2023-06-20 PROCEDURE — 96375 TX/PRO/DX INJ NEW DRUG ADDON: CPT

## 2023-06-20 PROCEDURE — C9113 INJ PANTOPRAZOLE SODIUM, VIA: HCPCS | Performed by: INTERNAL MEDICINE

## 2023-06-20 RX ORDER — LORAZEPAM 2 MG/ML
1 INJECTION INTRAMUSCULAR ONCE AS NEEDED
Status: COMPLETED | OUTPATIENT
Start: 2023-06-20 | End: 2023-06-20

## 2023-06-20 RX ORDER — METHYLPREDNISOLONE SODIUM SUCCINATE 125 MG/2ML
60 INJECTION, POWDER, LYOPHILIZED, FOR SOLUTION INTRAMUSCULAR; INTRAVENOUS ONCE
Status: CANCELLED
Start: 2023-06-21 | End: 2023-06-21

## 2023-06-20 RX ORDER — METHYLPREDNISOLONE SODIUM SUCCINATE 125 MG/2ML
60 INJECTION, POWDER, LYOPHILIZED, FOR SOLUTION INTRAMUSCULAR; INTRAVENOUS ONCE
Status: COMPLETED | OUTPATIENT
Start: 2023-06-20 | End: 2023-06-20

## 2023-06-20 RX ORDER — LORAZEPAM 2 MG/ML
1 INJECTION INTRAMUSCULAR ONCE AS NEEDED
Status: CANCELLED
Start: 2023-06-21

## 2023-06-20 RX ORDER — PANTOPRAZOLE SODIUM 40 MG/10ML
40 INJECTION, POWDER, LYOPHILIZED, FOR SOLUTION INTRAVENOUS ONCE
Status: COMPLETED | OUTPATIENT
Start: 2023-06-20 | End: 2023-06-20

## 2023-06-20 RX ORDER — PANTOPRAZOLE SODIUM 40 MG/10ML
40 INJECTION, POWDER, LYOPHILIZED, FOR SOLUTION INTRAVENOUS ONCE
Status: CANCELLED
Start: 2023-06-21 | End: 2023-06-21

## 2023-06-20 RX ADMIN — METHYLPREDNISOLONE SODIUM SUCCINATE 60 MG: 125 INJECTION, POWDER, FOR SOLUTION INTRAMUSCULAR; INTRAVENOUS at 15:19

## 2023-06-20 RX ADMIN — DIPHENHYDRAMINE HYDROCHLORIDE 50 MG: 50 INJECTION, SOLUTION INTRAMUSCULAR; INTRAVENOUS at 14:32

## 2023-06-20 RX ADMIN — PANTOPRAZOLE SODIUM 40 MG: 40 INJECTION, POWDER, FOR SOLUTION INTRAVENOUS at 15:23

## 2023-06-20 RX ADMIN — FAMOTIDINE 40 MG: 10 INJECTION, SOLUTION INTRAVENOUS at 15:26

## 2023-06-20 RX ADMIN — SODIUM CHLORIDE 1500 ML: 0.9 INJECTION, SOLUTION INTRAVENOUS at 13:50

## 2023-06-20 RX ADMIN — LORAZEPAM 1 MG: 2 INJECTION INTRAMUSCULAR; INTRAVENOUS at 14:06

## 2023-06-21 ENCOUNTER — HOSPITAL ENCOUNTER (OUTPATIENT)
Dept: INFUSION CENTER | Facility: HOSPITAL | Age: 48
Discharge: HOME | End: 2023-06-21
Attending: INTERNAL MEDICINE
Payer: COMMERCIAL

## 2023-06-21 VITALS
RESPIRATION RATE: 17 BRPM | DIASTOLIC BLOOD PRESSURE: 75 MMHG | OXYGEN SATURATION: 96 % | SYSTOLIC BLOOD PRESSURE: 113 MMHG | HEART RATE: 102 BPM | TEMPERATURE: 98.6 F

## 2023-06-21 DIAGNOSIS — R19.7 VOMITING AND DIARRHEA: ICD-10-CM

## 2023-06-21 DIAGNOSIS — R63.0 ANOREXIA: Primary | ICD-10-CM

## 2023-06-21 DIAGNOSIS — R11.10 VOMITING AND DIARRHEA: ICD-10-CM

## 2023-06-21 DIAGNOSIS — E44.1 MILD PROTEIN-CALORIE MALNUTRITION (HCC): ICD-10-CM

## 2023-06-21 PROCEDURE — C9113 INJ PANTOPRAZOLE SODIUM, VIA: HCPCS | Performed by: INTERNAL MEDICINE

## 2023-06-21 PROCEDURE — 96361 HYDRATE IV INFUSION ADD-ON: CPT

## 2023-06-21 PROCEDURE — 96375 TX/PRO/DX INJ NEW DRUG ADDON: CPT

## 2023-06-21 PROCEDURE — 96367 TX/PROPH/DG ADDL SEQ IV INF: CPT

## 2023-06-21 PROCEDURE — 96365 THER/PROPH/DIAG IV INF INIT: CPT

## 2023-06-21 RX ORDER — PANTOPRAZOLE SODIUM 40 MG/10ML
40 INJECTION, POWDER, LYOPHILIZED, FOR SOLUTION INTRAVENOUS ONCE
Status: CANCELLED
Start: 2023-06-22 | End: 2023-06-22

## 2023-06-21 RX ORDER — LORAZEPAM 2 MG/ML
1 INJECTION INTRAMUSCULAR ONCE AS NEEDED
Status: COMPLETED | OUTPATIENT
Start: 2023-06-21 | End: 2023-06-21

## 2023-06-21 RX ORDER — PANTOPRAZOLE SODIUM 40 MG/10ML
40 INJECTION, POWDER, LYOPHILIZED, FOR SOLUTION INTRAVENOUS ONCE
Status: COMPLETED | OUTPATIENT
Start: 2023-06-21 | End: 2023-06-21

## 2023-06-21 RX ORDER — METHYLPREDNISOLONE SODIUM SUCCINATE 125 MG/2ML
60 INJECTION, POWDER, LYOPHILIZED, FOR SOLUTION INTRAMUSCULAR; INTRAVENOUS ONCE
Status: CANCELLED
Start: 2023-06-22 | End: 2023-06-22

## 2023-06-21 RX ORDER — METHYLPREDNISOLONE SODIUM SUCCINATE 125 MG/2ML
60 INJECTION, POWDER, LYOPHILIZED, FOR SOLUTION INTRAMUSCULAR; INTRAVENOUS ONCE
Status: COMPLETED | OUTPATIENT
Start: 2023-06-21 | End: 2023-06-21

## 2023-06-21 RX ORDER — EPINEPHRINE 1 MG/ML
0.3 INJECTION, SOLUTION, CONCENTRATE INTRAVENOUS
OUTPATIENT
Start: 2023-07-10

## 2023-06-21 RX ORDER — LORAZEPAM 2 MG/ML
1 INJECTION INTRAMUSCULAR ONCE AS NEEDED
Status: CANCELLED
Start: 2023-06-22

## 2023-06-21 RX ADMIN — FAMOTIDINE 40 MG: 10 INJECTION, SOLUTION INTRAVENOUS at 13:46

## 2023-06-21 RX ADMIN — LORAZEPAM 1 MG: 2 INJECTION INTRAMUSCULAR; INTRAVENOUS at 12:53

## 2023-06-21 RX ADMIN — PANTOPRAZOLE SODIUM 40 MG: 40 INJECTION, POWDER, FOR SOLUTION INTRAVENOUS at 14:36

## 2023-06-21 RX ADMIN — METHYLPREDNISOLONE SODIUM SUCCINATE 60 MG: 125 INJECTION, POWDER, FOR SOLUTION INTRAMUSCULAR; INTRAVENOUS at 14:20

## 2023-06-21 RX ADMIN — SODIUM CHLORIDE 1500 ML: 0.9 INJECTION, SOLUTION INTRAVENOUS at 12:50

## 2023-06-21 RX ADMIN — DIPHENHYDRAMINE HYDROCHLORIDE 50 MG: 50 INJECTION, SOLUTION INTRAMUSCULAR; INTRAVENOUS at 12:58

## 2023-06-22 ENCOUNTER — TELEPHONE (OUTPATIENT)
Dept: UROLOGY | Facility: MEDICAL CENTER | Age: 48
End: 2023-06-22

## 2023-06-22 ENCOUNTER — HOSPITAL ENCOUNTER (OUTPATIENT)
Dept: INFUSION CENTER | Facility: HOSPITAL | Age: 48
Discharge: HOME | End: 2023-06-22
Attending: INTERNAL MEDICINE
Payer: COMMERCIAL

## 2023-06-22 VITALS
HEART RATE: 96 BPM | SYSTOLIC BLOOD PRESSURE: 131 MMHG | DIASTOLIC BLOOD PRESSURE: 82 MMHG | OXYGEN SATURATION: 98 % | TEMPERATURE: 97.3 F | RESPIRATION RATE: 18 BRPM

## 2023-06-22 DIAGNOSIS — R63.0 ANOREXIA: Primary | ICD-10-CM

## 2023-06-22 DIAGNOSIS — R19.7 VOMITING AND DIARRHEA: ICD-10-CM

## 2023-06-22 DIAGNOSIS — E44.1 MILD PROTEIN-CALORIE MALNUTRITION (HCC): ICD-10-CM

## 2023-06-22 DIAGNOSIS — R11.10 VOMITING AND DIARRHEA: ICD-10-CM

## 2023-06-22 PROCEDURE — C9113 INJ PANTOPRAZOLE SODIUM, VIA: HCPCS | Performed by: INTERNAL MEDICINE

## 2023-06-22 PROCEDURE — 96365 THER/PROPH/DIAG IV INF INIT: CPT

## 2023-06-22 PROCEDURE — 96361 HYDRATE IV INFUSION ADD-ON: CPT

## 2023-06-22 PROCEDURE — 96375 TX/PRO/DX INJ NEW DRUG ADDON: CPT

## 2023-06-22 RX ORDER — LORAZEPAM 2 MG/ML
1 INJECTION INTRAMUSCULAR ONCE AS NEEDED
Status: CANCELLED
Start: 2023-06-23

## 2023-06-22 RX ORDER — METHYLPREDNISOLONE SODIUM SUCCINATE 125 MG/2ML
60 INJECTION, POWDER, LYOPHILIZED, FOR SOLUTION INTRAMUSCULAR; INTRAVENOUS ONCE
Status: CANCELLED
Start: 2023-06-23 | End: 2023-06-23

## 2023-06-22 RX ORDER — LORAZEPAM 2 MG/ML
1 INJECTION INTRAMUSCULAR ONCE AS NEEDED
Status: COMPLETED | OUTPATIENT
Start: 2023-06-22 | End: 2023-06-22

## 2023-06-22 RX ORDER — METHYLPREDNISOLONE SODIUM SUCCINATE 125 MG/2ML
60 INJECTION, POWDER, LYOPHILIZED, FOR SOLUTION INTRAMUSCULAR; INTRAVENOUS ONCE
Status: COMPLETED | OUTPATIENT
Start: 2023-06-22 | End: 2023-06-22

## 2023-06-22 RX ORDER — PANTOPRAZOLE SODIUM 40 MG/10ML
40 INJECTION, POWDER, LYOPHILIZED, FOR SOLUTION INTRAVENOUS ONCE
Status: CANCELLED
Start: 2023-06-23 | End: 2023-06-23

## 2023-06-22 RX ORDER — PANTOPRAZOLE SODIUM 40 MG/10ML
40 INJECTION, POWDER, LYOPHILIZED, FOR SOLUTION INTRAVENOUS ONCE
Status: COMPLETED | OUTPATIENT
Start: 2023-06-22 | End: 2023-06-22

## 2023-06-22 RX ADMIN — METHYLPREDNISOLONE SODIUM SUCCINATE 60 MG: 125 INJECTION, POWDER, FOR SOLUTION INTRAMUSCULAR; INTRAVENOUS at 12:57

## 2023-06-22 RX ADMIN — DIPHENHYDRAMINE HYDROCHLORIDE 50 MG: 50 INJECTION, SOLUTION INTRAMUSCULAR; INTRAVENOUS at 12:31

## 2023-06-22 RX ADMIN — PANTOPRAZOLE SODIUM 40 MG: 40 INJECTION, POWDER, FOR SOLUTION INTRAVENOUS at 13:04

## 2023-06-22 RX ADMIN — LORAZEPAM 1 MG: 2 INJECTION INTRAMUSCULAR; INTRAVENOUS at 12:27

## 2023-06-22 RX ADMIN — FAMOTIDINE 40 MG: 10 INJECTION, SOLUTION INTRAVENOUS at 13:10

## 2023-06-22 RX ADMIN — SODIUM CHLORIDE 1500 ML: 0.9 INJECTION, SOLUTION INTRAVENOUS at 12:27

## 2023-06-22 NOTE — TELEPHONE ENCOUNTER
Called pt and left msg on VM per comm consent that per Dr Audria Bamberger she does not need a cysto in 3 months as was previously advised due to benign tissue pathology

## 2023-06-23 ENCOUNTER — HOSPITAL ENCOUNTER (OUTPATIENT)
Dept: INFUSION CENTER | Facility: HOSPITAL | Age: 48
End: 2023-06-23
Attending: INTERNAL MEDICINE
Payer: COMMERCIAL

## 2023-06-23 ENCOUNTER — HOSPITAL ENCOUNTER (OUTPATIENT)
Dept: RADIOLOGY | Facility: HOSPITAL | Age: 48
Discharge: HOME | End: 2023-06-23
Payer: COMMERCIAL

## 2023-06-23 ENCOUNTER — OFFICE VISIT (OUTPATIENT)
Dept: CARDIOLOGY CLINIC | Facility: HOSPITAL | Age: 48
End: 2023-06-23
Payer: COMMERCIAL

## 2023-06-23 ENCOUNTER — OFFICE VISIT (OUTPATIENT)
Dept: FAMILY MEDICINE CLINIC | Facility: CLINIC | Age: 48
End: 2023-06-23
Payer: COMMERCIAL

## 2023-06-23 VITALS
SYSTOLIC BLOOD PRESSURE: 142 MMHG | OXYGEN SATURATION: 99 % | HEIGHT: 65 IN | HEART RATE: 98 BPM | TEMPERATURE: 98.6 F | DIASTOLIC BLOOD PRESSURE: 90 MMHG | WEIGHT: 157.4 LBS | RESPIRATION RATE: 18 BRPM | BODY MASS INDEX: 26.23 KG/M2

## 2023-06-23 VITALS
RESPIRATION RATE: 18 BRPM | HEART RATE: 94 BPM | DIASTOLIC BLOOD PRESSURE: 85 MMHG | TEMPERATURE: 97.6 F | OXYGEN SATURATION: 96 % | SYSTOLIC BLOOD PRESSURE: 125 MMHG

## 2023-06-23 VITALS
HEIGHT: 65 IN | WEIGHT: 157 LBS | OXYGEN SATURATION: 99 % | BODY MASS INDEX: 26.16 KG/M2 | SYSTOLIC BLOOD PRESSURE: 124 MMHG | HEART RATE: 96 BPM | DIASTOLIC BLOOD PRESSURE: 80 MMHG

## 2023-06-23 DIAGNOSIS — E44.1 MILD PROTEIN-CALORIE MALNUTRITION (HCC): ICD-10-CM

## 2023-06-23 DIAGNOSIS — R65.10: ICD-10-CM

## 2023-06-23 DIAGNOSIS — R19.7 VOMITING AND DIARRHEA: ICD-10-CM

## 2023-06-23 DIAGNOSIS — R56.9 SEIZURE (HCC): ICD-10-CM

## 2023-06-23 DIAGNOSIS — R10.11 CHRONIC RUQ PAIN: ICD-10-CM

## 2023-06-23 DIAGNOSIS — I50.30 DIASTOLIC CONGESTIVE HEART FAILURE, UNSPECIFIED HF CHRONICITY (HCC): ICD-10-CM

## 2023-06-23 DIAGNOSIS — R00.2 HEART PALPITATIONS: ICD-10-CM

## 2023-06-23 DIAGNOSIS — G43.809 OTHER MIGRAINE WITHOUT STATUS MIGRAINOSUS, NOT INTRACTABLE: ICD-10-CM

## 2023-06-23 DIAGNOSIS — R63.0 ANOREXIA: Primary | ICD-10-CM

## 2023-06-23 DIAGNOSIS — R11.2 INTRACTABLE NAUSEA AND VOMITING: ICD-10-CM

## 2023-06-23 DIAGNOSIS — D89.40 MAST CELL ACTIVATION SYNDROME (HCC): Primary | ICD-10-CM

## 2023-06-23 DIAGNOSIS — I47.1 ATRIAL TACHYCARDIA (HCC): ICD-10-CM

## 2023-06-23 DIAGNOSIS — G89.29 CHRONIC RUQ PAIN: ICD-10-CM

## 2023-06-23 DIAGNOSIS — I47.1 ATRIAL TACHYCARDIA (HCC): Primary | ICD-10-CM

## 2023-06-23 DIAGNOSIS — D30.3 BENIGN BLADDER TUMOR: ICD-10-CM

## 2023-06-23 DIAGNOSIS — K50.119 CROHN'S DISEASE OF COLON WITH COMPLICATION (HCC): ICD-10-CM

## 2023-06-23 DIAGNOSIS — D89.40 MAST CELL ACTIVATION SYNDROME (HCC): ICD-10-CM

## 2023-06-23 DIAGNOSIS — R11.10 VOMITING AND DIARRHEA: ICD-10-CM

## 2023-06-23 PROBLEM — M32.9 SYSTEMIC LUPUS ERYTHEMATOSUS, UNSPECIFIED SLE TYPE, UNSPECIFIED ORGAN INVOLVEMENT STATUS (HCC): Status: ACTIVE | Noted: 2023-06-23

## 2023-06-23 PROCEDURE — C9113 INJ PANTOPRAZOLE SODIUM, VIA: HCPCS | Performed by: INTERNAL MEDICINE

## 2023-06-23 PROCEDURE — 99214 OFFICE O/P EST MOD 30 MIN: CPT | Performed by: INTERNAL MEDICINE

## 2023-06-23 PROCEDURE — 99495 TRANSJ CARE MGMT MOD F2F 14D: CPT | Performed by: FAMILY MEDICINE

## 2023-06-23 PROCEDURE — 71046 X-RAY EXAM CHEST 2 VIEWS: CPT

## 2023-06-23 PROCEDURE — 96375 TX/PRO/DX INJ NEW DRUG ADDON: CPT

## 2023-06-23 PROCEDURE — 96367 TX/PROPH/DG ADDL SEQ IV INF: CPT

## 2023-06-23 PROCEDURE — 96365 THER/PROPH/DIAG IV INF INIT: CPT

## 2023-06-23 PROCEDURE — 96361 HYDRATE IV INFUSION ADD-ON: CPT

## 2023-06-23 RX ORDER — METHYLPREDNISOLONE SODIUM SUCCINATE 125 MG/2ML
60 INJECTION, POWDER, LYOPHILIZED, FOR SOLUTION INTRAMUSCULAR; INTRAVENOUS ONCE
Status: COMPLETED | OUTPATIENT
Start: 2023-06-23 | End: 2023-06-23

## 2023-06-23 RX ORDER — NALOXONE HYDROCHLORIDE 4 MG/.1ML
SPRAY NASAL
Qty: 1 EACH | Refills: 1 | Status: SHIPPED | OUTPATIENT
Start: 2023-06-23 | End: 2024-06-22

## 2023-06-23 RX ORDER — LORAZEPAM 1 MG/1
TABLET ORAL
COMMUNITY
Start: 2023-06-06

## 2023-06-23 RX ORDER — TRAMADOL HYDROCHLORIDE 50 MG/1
50 TABLET ORAL EVERY 12 HOURS PRN
Qty: 60 TABLET | Refills: 0 | Status: SHIPPED | OUTPATIENT
Start: 2023-06-23

## 2023-06-23 RX ORDER — METHYLPREDNISOLONE SODIUM SUCCINATE 125 MG/2ML
60 INJECTION, POWDER, LYOPHILIZED, FOR SOLUTION INTRAMUSCULAR; INTRAVENOUS ONCE
Status: CANCELLED
Start: 2023-06-26 | End: 2023-06-24

## 2023-06-23 RX ORDER — LORAZEPAM 2 MG/ML
1 INJECTION INTRAMUSCULAR ONCE AS NEEDED
Status: CANCELLED
Start: 2023-06-26

## 2023-06-23 RX ORDER — BUTALBITAL, ACETAMINOPHEN AND CAFFEINE 50; 325; 40 MG/1; MG/1; MG/1
1 TABLET ORAL EVERY 8 HOURS PRN
Qty: 20 TABLET | Refills: 1 | Status: SHIPPED | OUTPATIENT
Start: 2023-06-23

## 2023-06-23 RX ORDER — DIPHENHYDRAMINE HYDROCHLORIDE 50 MG/ML
50 INJECTION INTRAMUSCULAR; INTRAVENOUS EVERY 6 HOURS PRN
Qty: 20 ML | Refills: 2 | Status: SHIPPED | OUTPATIENT
Start: 2023-06-23

## 2023-06-23 RX ORDER — LORAZEPAM 2 MG/ML
1 INJECTION INTRAMUSCULAR ONCE AS NEEDED
Status: COMPLETED | OUTPATIENT
Start: 2023-06-23 | End: 2023-06-23

## 2023-06-23 RX ORDER — PANTOPRAZOLE SODIUM 40 MG/10ML
40 INJECTION, POWDER, LYOPHILIZED, FOR SOLUTION INTRAVENOUS ONCE
Status: CANCELLED
Start: 2023-06-26 | End: 2023-06-24

## 2023-06-23 RX ORDER — PANTOPRAZOLE SODIUM 40 MG/10ML
40 INJECTION, POWDER, LYOPHILIZED, FOR SOLUTION INTRAVENOUS ONCE
Status: COMPLETED | OUTPATIENT
Start: 2023-06-23 | End: 2023-06-23

## 2023-06-23 RX ADMIN — FAMOTIDINE 40 MG: 10 INJECTION, SOLUTION INTRAVENOUS at 14:27

## 2023-06-23 RX ADMIN — METHYLPREDNISOLONE SODIUM SUCCINATE 60 MG: 125 INJECTION, POWDER, FOR SOLUTION INTRAMUSCULAR; INTRAVENOUS at 14:18

## 2023-06-23 RX ADMIN — PANTOPRAZOLE SODIUM 40 MG: 40 INJECTION, POWDER, FOR SOLUTION INTRAVENOUS at 14:22

## 2023-06-23 RX ADMIN — LORAZEPAM 1 MG: 2 INJECTION INTRAMUSCULAR; INTRAVENOUS at 13:30

## 2023-06-23 RX ADMIN — SODIUM CHLORIDE 1500 ML: 0.9 INJECTION, SOLUTION INTRAVENOUS at 13:41

## 2023-06-23 RX ADMIN — DIPHENHYDRAMINE HYDROCHLORIDE 50 MG: 50 INJECTION, SOLUTION INTRAMUSCULAR; INTRAVENOUS at 13:44

## 2023-06-23 NOTE — PROGRESS NOTES
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HCC)  -     diphenhydrAMINE (BENADRYL) 50 mg/mL; Inject 1 mL (50 mg total) into a muscle every 6 (six) hours as needed for itching or allergies    Seizure (HCC)    Crohn's disease of colon with complication (HCC)    Intractable nausea and vomiting    Benign bladder tumor    SIRS of non-infectious origin w/o acute organ dysfunction (HCC)    Other migraine without status migrainosus, not intractable  -     butalbital-acetaminophen-caffeine (FIORICET,ESGIC) -40 mg per tablet; Take 1 tablet by mouth every 8 (eight) hours as needed for migraine    Chronic RUQ pain  -     traMADol (Ultram) 50 mg tablet; Take 1 tablet (50 mg total) by mouth every 12 (twelve) hours as needed for moderate pain or severe pain  -     naloxone (NARCAN) 4 mg/0 1 mL nasal spray; Administer 1 spray into a nostril  If no response after 2-3 minutes, give another dose in the other nostril using a new spray  Other orders  -     LORazepam (ATIVAN) 1 mg tablet      Plan:    Stable post-hospitalization  Medication refills as above  Will continue other medications at this time   Will work on requested letter  Will start prn Tramadol for pain  Controlled substance agreement signed today  PDMP reviewed without concerns  Patient in agreement with plan  Return in about 3 months (around 9/23/2023) for Recheck  Subjective     Patient Id: Megan Hearn is a 46 y o  female    HPI    Here today for TCM visit  Recently hospitalized from 5/29/23 - 6/13/23 after being admitted for nausea, vomiting and reduced oral intake  She also had a questionable seizure related to her MCAS  She ultimately also underwent cystoscopy which identified a bladder tumor that was benign  She has outpatient follow-up with urology  She continues to express some frustration regarding coordination of her care   Notes that her MCAS specialist is no longer going to be able to see her in [place]  She also notes that her allergist at [facility] is retiring and she has been assigned to another physician there  She continue to receive her IV infusion therapy that is managed by GI  Today, she is requesting a letter from her PCP stating her medical conditions and treatment plan in order to help validate her conditions when she presents to hospitals and ER  She also is requesting a refill on her Fioricet which she takes sparingly for migraines  She notes that this is the only medication orally that works for her and she does not have any allergy to  She also is requesting a prescription for tramadol for pain  Reports ongoing intermittent RUQ pain that has been continued by IV opiates while in the hospital  She feels as though the tramadol has helped her in the past and she does not seem to have an allergy to it  She is currently taking oral Voltaren which is not affective  Otherwise no concerns today  Review of Systems   Constitutional: Negative for chills and fever  Respiratory: Negative for shortness of breath  Cardiovascular: Negative for chest pain  Gastrointestinal: Positive for abdominal pain (intermittent)  Negative for constipation, diarrhea, nausea and vomiting  Genitourinary: Negative for dysuria  Musculoskeletal: Negative for arthralgias and myalgias  Skin: Negative for rash  Neurological: Negative for headaches  Psychiatric/Behavioral: Negative for dysphoric mood  All other systems reviewed and are negative        Past Medical History:   Diagnosis Date   • Anemia 2007   • Anxiety    • Asthma    • Bile duct stricture 2014    Sphincter of oddi dysfunction   • Chronic kidney disease    • Colon polyp 1998   • Crohn's colitis ([facility] [address] )    • Deep vein thrombosis ([facility] [address] )    • Disease of thyroid gland    • 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)     NSTEMI  pt denies, documented in cardio note   • Pancreatitis     sphinger of    • Psychiatric disorder    • RA (rheumatoid arthritis) ([facility] [address] )    • Seizures ([facility] [address] )    • Ulcerative colitis ([facility] [address] )    • Visual impairment        Past Surgical History:   Procedure Laterality Date   • ABDOMINAL SURGERY  2017   • APPENDECTOMY     • CHOLECYSTECTOMY     • COLONOSCOPY  2019   • CYSTOSCOPY N/A 6/8/2023    Procedure: CYSTOSCOPY, mini TURBT with fulgeration;  Surgeon: Kamilla Olmedo MD;  Location: [facility];  Service: Urology   • EGD AND COLONOSCOPY N/A 09/12/2017    Procedure: EGD AND COLONOSCOPY;  Surgeon: Marco Lockhart MD;  Location:  GI LAB; Service: Gastroenterology   • ERCP N/A 09/15/2017    Procedure: ENDOSCOPIC RETROGRADE CHOLANGIOPANCREATOGRAPHY (ERCP); Surgeon: Veronica David MD;  Location: [facility];   Service: Gastroenterology   • HYSTERECTOMY     • KNEE SURGERY Right    • LAPAROSCOPIC ENDOMETRIOSIS FULGURATION     • ORIF ANKLE FRACTURE Left    • WV ARTHRS KNE SURG W/MENISCECTOMY MED/LAT W/SHVG Left 05/12/2017    Procedure: POSSIBLE MEDIAL MENISECTOMY;  Surgeon: Coni Melo MD;  Location: [facility];  Service: Orthopedics   • WV ARTHRS KNEE DEBRIDEMENT/SHAVING ARTCLR CRTLG Left 05/12/2017    Procedure: KNEE ARTHROSCOPY EVALUATION, CHONDROPLASTY;  Surgeon: Cnoi Melo MD;  Location: [facility];  Service: Orthopedics   • WV LAPS ABD PRTM&OMENTUM DX W/WO SPEC BR/WA SPX N/A 12/12/2017    Procedure: LAPAROSCOPY DIAGNOSTIC  WITH LYSIS OF ADHESIONS;  Surgeon: Marissa Duomnt DO;  Location:  MAIN OR;  Service: General   • UPPER GASTROINTESTINAL ENDOSCOPY  2019       Family History   Problem Relation Age of Onset   • Ulcerative colitis Mother    • Arthritis Mother    • Colon polyps Mother    • Heart failure Father • Neuropathy Father    • Macular degeneration Father    • Diabetes Father    • Early death Father    • Vision loss Father    • Asthma Daughter    • Hypothyroidism Daughter    • Heart disease Maternal Grandmother    • Arthritis Maternal Grandmother    • No Known Problems Maternal Grandfather    • Arthritis Paternal Grandmother    • Macular degeneration Paternal Grandmother    • Vision loss Paternal Grandmother    • Lymphoma Paternal Grandfather    • Cancer Paternal Grandfather    • Early death Paternal Grandfather    • Breast cancer Maternal Aunt    • Cancer Maternal Aunt    • Miscarriages / Stillbirths Maternal Aunt    • Heart failure Paternal Aunt    • Heart failure Paternal Aunt    • Irritable bowel syndrome Paternal Aunt    • Breast cancer Paternal Aunt    • Breast cancer additional onset Paternal [de-identified]    • Hypothyroidism Paternal Aunt    • Cancer Paternal Aunt    • No Known Problems Son    • No Known Problems Son    • Kidney disease Brother    • Depression Paternal Uncle    • Early death Paternal Uncle        Social History     Socioeconomic History   • Marital status: /Civil Union     Spouse name: None   • Number of children: None   • Years of education: None   • Highest education level: None   Occupational History   • None   Tobacco Use   • Smoking status: Never   • Smokeless tobacco: Never   Vaping Use   • Vaping Use: Never used   Substance and Sexual Activity   • Alcohol use: Never   • Drug use: Never   • Sexual activity: Not Currently     Partners: Male     Birth control/protection: Other     Comment: Full hysterectomy   Other Topics Concern   • None   Social History Narrative   • None     Social Determinants of Health     Financial Resource Strain: Low Risk  (2/6/2023)    Overall Financial Resource Strain (CARDIA)    • Difficulty of Paying Living Expenses: Not very hard   Food Insecurity: No Food Insecurity (5/30/2023)    Hunger Vital Sign    • Worried About Running Out of Food in the Last Year: Never true    • Ran Out of Food in the Last Year: Never true   Transportation Needs: No Transportation Needs (5/30/2023)    PRAPARE - Transportation    • Lack of Transportation (Medical): No    • Lack of Transportation (Non-Medical):  No   Physical Activity: Not on file   Stress: Not on file   Social Connections: Not on file   Intimate Partner Violence: Not on file   Housing Stability: Low Risk  (5/30/2023)    Housing Stability Vital Sign    • Unable to Pay for Housing in the Last Year: No    • Number of [place] in the Last Year: 1    • Unstable Housing in the Last Year: No       Allergies   Allergen Reactions   • Aimovig [Erenumab-Aooe] Anaphylaxis   • Amitriptyline Anaphylaxis     According to the patient she had nphylaxis   • Aspergillus Allergy Skin Test Other (See Comments), Hives and Swelling   • Azathioprine Other (See Comments) and Anaphylaxis     pancreatitis  According to patient she needed Epipen   • Banana - Food Allergy Itching, Swelling and Anaphylaxis   • Buspar [Buspirone] Hives and Shortness Of Breath   • Citric Acid-Polysorbate 80 Abdominal Pain, Anaphylaxis, Anxiety, Bradycardia, Diarrhea, Fatigue, GI Intolerance, Headache, Hives, Hyperactivity, Itching, Lip Swelling, Nausea Only, Palpitations, Rash, Shortness Of Breath, Tachycardia, Throat Swelling, Tongue Swelling and Vomiting   • Corn Dextrin Anaphylaxis     Any corn based products   • Eggs Or Egg-Derived Products - Food Allergy Anaphylaxis   • Epinephrine Anaphylaxis   • Erenumab Anaphylaxis     patient sent portal message stating she had anaphylaxis  patient sent portal message stating she had anaphylaxis     • Gadolinium Abdominal Pain, Anaphylaxis, Anxiety, Confusion, Delirium, Dizziness, Eye Swelling, Fatigue, GI Intolerance, Headache, Hives, Irritability, Itching, Lip Swelling, Nausea Only, Palpitations, Photosensitivity, Rash, Seizures, Shortness Of Breath, Swelling, Syncope, Throat Swelling, Tongue Swelling, Tremor, Visual "Disturbance and Vomiting   • Gelatin - Food Allergy Anaphylaxis   • Heparin Hives     Painful welts    • Lactated Ringers Shortness Of Breath     Pt questions this allergy, pt has received LR multiple times without reaction   • Lavender Oil Anaphylaxis     Other reaction(s): anaphalxis   • Malto Dextrin [Dextrin] Anaphylaxis   • Other Anaphylaxis     Gel caps, maltodextrose, dextrose,grapes, lavender    • Penicillium Notatum Shortness Of Breath and Swelling   • Sumatriptan Anaphylaxis     Bradycardia, sob, back pressure, head pain,throat tightness  According to the patient she had anphylaxis   • Ustekinumab Anaphylaxis   • Contrast [Iodinated Contrast Media] Other (See Comments)     Pt states needs to be premedicated prior to injection   • Corn Oil - Food Allergy - Food Allergy Hives   • Humira [Adalimumab] Other (See Comments)     \"I develop drug induced lupus\"   • Ibuprofen Hives and Throat Swelling   • Polyethylene Glycol Diarrhea and Vomiting   • Red Dye - Food Allergy Other (See Comments)     intolerance   • Remicade [Infliximab] Other (See Comments)     \"I develop drug induced lupus\"   • Soy Allergy - Food Allergy Other (See Comments)   • Sulfa Antibiotics Hives and Other (See Comments)   • Sulfate Hives   • Sulfites - Food Allergy Hives   • Chlorhexidine Itching   • Freederm Adhesive Remover [New Skin] Rash   • Histamine Hives, Rash and Other (See Comments)     Intolerance           Current Outpatient Medications:   •  B-D 3CC LUER-SHAMEKA SYR 25GX1\" 25G X 1\" 3 ML MISC, , Disp: , Rfl:   •  butalbital-acetaminophen-caffeine (FIORICET,ESGIC) -40 mg per tablet, Take 1 tablet by mouth every 8 (eight) hours as needed for migraine, Disp: 20 tablet, Rfl: 1  •  cetirizine (ZyrTEC) 10 mg tablet, Take 20 mg by mouth daily, Disp: , Rfl:   •  Cholecalciferol 10 MCG /0 025ML LIQD, Take 1,000 Units by mouth 2 (two) times a day, Disp: 750 mL, Rfl: 1  •  cyanocobalamin 1,000 mcg/mL, , Disp: , Rfl:   •  diphenhydrAMINE " (BENADRYL) 50 mg/mL, Inject 1 mL (50 mg total) into a muscle every 6 (six) hours as needed for itching or allergies, Disp: 20 mL, Rfl: 2  •  EpiPen 2-Malachi 0 3 MG/0 3ML SOAJ, INJECT 1 PEN INTO THE MUSCLE AS NEEDED FOR ANAPHALAXIS, Disp: , Rfl:   •  famotidine (PEPCID) 40 MG tablet, TAKE ONE TABLET BY MOUTH ONCE DAILY, Disp: 90 tablet, Rfl: 3  •  hydrOXYzine HCL (ATARAX) 25 mg tablet, , Disp: , Rfl:   •  Lactobacillus (PROBIOTIC ACIDOPHILUS PO), Take by mouth, Disp: , Rfl:   •  Lactobacillus-Inulin ([facility]) CAPS, Take 200 mg by mouth daily, Disp: , Rfl:   •  levalbuterol (XOPENEX HFA) 45 mcg/act inhaler, Inhale 1-2 puffs every 4 (four) hours as needed for shortness of breath, Disp: 45 g, Rfl: 2  •  Levalbuterol HCl (XOPENEX IN), , Disp: , Rfl:   •  levothyroxine 100 mcg tablet, Take 100 mcg by mouth every other day, Disp: , Rfl:   •  liothyronine (CYTOMEL) 5 mcg tablet, Take 1 tablet (5 mcg total) by mouth daily Do not start before June 14, 2023 , Disp: 30 tablet, Rfl: 0  •  LORazepam (ATIVAN) 1 mg tablet, , Disp: , Rfl:   •  montelukast (SINGULAIR) 10 mg tablet, , Disp: , Rfl:   •  naloxone (NARCAN) 4 mg/0 1 mL nasal spray, Administer 1 spray into a nostril  If no response after 2-3 minutes, give another dose in the other nostril using a new spray , Disp: 1 each, Rfl: 1  •  NON FORMULARY, immunoglobin sq 6g 30 ml once a week, Disp: , Rfl:   •  nystatin (MYCOSTATIN) 500,000 units/5 mL suspension, Swish and spit 5 mL (500,000 Units total) 4 (four) times a day, Disp: 473 mL, Rfl: 0  •  pantoprazole (PROTONIX) 40 mg tablet, TAKE ONE TABLET BY MOUTH TWICE A DAY, Disp: 60 tablet, Rfl: 5  •  Potassium Chloride 10 % SOLN, Take 99 mg by mouth 2 (two) times a day, Disp: , Rfl:   •  predniSONE 20 mg tablet, Take 1 tablet (20 mg total) by mouth 2 (two) times a day with meals Take as instructed by physician   20 mg three times a day for 3 days, then 20 mg twice daily, Disp: 60 tablet, Rfl: 0  •  Riboflavin "(Vitamin B-2) 100 MG TABS, Take 4 tablets (400 mg total) by mouth daily, Disp: 360 tablet, Rfl: 3  •  Riboflavin (Vitamin B-2) 100 MG TABS, Take 100 mg by mouth 4 (four) times a day, Disp: , Rfl:   •  TechLite Lancets MISC, , Disp: , Rfl:   •  traMADol (Ultram) 50 mg tablet, Take 1 tablet (50 mg total) by mouth every 12 (twelve) hours as needed for moderate pain or severe pain, Disp: 60 tablet, Rfl: 0  •  omalizumab (XOLAIR) 150 mg, Inject 300 mg under the skin every 21 days (Patient not taking: Reported on 6/23/2023), Disp: , Rfl:     Objective     Vitals:    06/23/23 1028   BP: 142/90   BP Location: Left arm   Patient Position: Sitting   Cuff Size: Standard   Pulse: 98   Resp: 18   Temp: 98 6 °F (37 °C)   SpO2: 99%   Weight: 71 4 kg (157 lb 6 4 oz)   Height: 5' 5\" (1 651 m)       Physical Exam  Vitals and nursing note reviewed  Constitutional:       General: She is not in acute distress  Appearance: Normal appearance  She is well-developed  She is not ill-appearing or toxic-appearing  HENT:      Head: Normocephalic and atraumatic  Neck:      Thyroid: No thyromegaly  Cardiovascular:      Rate and Rhythm: Normal rate and regular rhythm  Heart sounds: Normal heart sounds  No murmur heard  Pulmonary:      Effort: Pulmonary effort is normal  No respiratory distress  Breath sounds: Normal breath sounds  No wheezing, rhonchi or rales  Genitourinary:     Comments: Exam deferred  Musculoskeletal:      Cervical back: Neck supple  Skin:     General: Skin is warm  Neurological:      General: No focal deficit present  Mental Status: She is alert and oriented to person, place, and time  Mental status is at baseline     Psychiatric:         Mood and Affect: Mood normal          Behavior: Behavior normal          Kelton Faye 26  "

## 2023-06-23 NOTE — PROGRESS NOTES
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  From a cardiac standpoint she was concerned about her blood pressure which is normal today I checked both arms they were equal   We will check an echocardiogram to make sure there is been no development of left ventricular hypertrophy  I have also ordered a chest x-ray for some shortness of breath  Overall no significant cardiovascular concerns  Interval History:  57-year-old female I met in the hospital from iatrogenic fluid overload  Overall she has been doing well from a cardiac standpoint  We ordered a echocardiogram last year which showed normal diastolic parameters and stress echo showed no evidence of ischemia  She has 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  From a cardiac standpoint she has no anginal symptoms  She has no lower extremity edema, PND, orthopnea  His been no palpitations  She was recently in the hospital for an episode of seizure and some neurologic complaints  She tells me that she had elevated diastolic blood pressures  From a cardiac standpoint has been no angina, lightheadedness, dizziness  There is been no lower extremity edema, PND, orthopnea        Problem List Chondromalacia    Crohn's colitis ([facility] )    Ulcerative colitis ([facility] )    Vitamin D deficiency    Hypokalemia    Hypotension    Throat tightness    Septic shock (HCC)    Bradycardia    Abdominal pain    Headache    Hypophosphatemia    Hypomagnesemia    Generalized anxiety disorder (Chronic)    Hypothyroidism (Chronic)    Low TSH level    Hypocalcemia    Hyperglycemia    Acute respiratory failure with hypoxia (HCC)    Bilateral pleural effusion    Acute cardiogenic pulmonary edema (HCC)    Elevated troponin level    Abnormal CT of the abdomen    Leukocytosis    Arm swelling    Thrombophlebitis    Dysuria    Anxiety    CHF (congestive heart failure) (HCC)    Fibromyalgia    Mast cell disorder    Meniere disease    Visual disturbance    Chronic idiopathic urticaria    Disorder of synovium        Past Medical History:   Diagnosis Date   • Anemia 2007   • Anxiety    • Asthma    • Bile duct stricture 2014    Sphincter of oddi dysfunction   • Chronic kidney disease    • Colon polyp 1998   • Crohn's colitis ([facility] )    • Deep vein thrombosis ([place] [address] )    • Disease of thyroid gland    • 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   • Generalized anxiety disorder 08/26/2017   • GERD (gastroesophageal reflux disease) 1995   • GI (gastrointestinal bleed) 1994   • Heart murmur    • Inflammatory bowel disease    • Irritable bowel syndrome 2016   • Lactose intolerance 1982   • Mast cell disease    • Migraine    • Myocardial infarction ([place] [address] )     NSTEMI  pt denies, documented in cardio note   • Pancreatitis     sphinger of    • Psychiatric disorder    • RA (rheumatoid arthritis) ([place] [address] )    • Seizures ([place] [address] )    • Ulcerative colitis ([place] [address] )    • Visual impairment      Social History     Socioeconomic History   • Marital status: /Civil Union     Spouse name: Not on file   • Number of children: Not on file   • Years of education: Not on file   • Highest education level: Not on file   Occupational History   • Not on file   Tobacco Use • Smoking status: Never   • Smokeless tobacco: Never   Vaping Use   • Vaping Use: Never used   Substance and Sexual Activity   • Alcohol use: Never   • Drug use: Never   • Sexual activity: Not Currently     Partners: Male     Birth control/protection: Other     Comment: Full hysterectomy   Other Topics Concern   • Not on file   Social History Narrative   • Not on file     Social Determinants of Health     Financial Resource Strain: Low Risk  (2/6/2023)    Overall Financial Resource Strain (CARDIA)    • Difficulty of Paying Living Expenses: Not very hard   Food Insecurity: No Food Insecurity (5/30/2023)    Hunger Vital Sign    • Worried About Running Out of Food in the Last Year: Never true    • Ran Out of Food in the Last Year: Never true   Transportation Needs: No Transportation Needs (5/30/2023)    PRAPARE - Transportation    • Lack of Transportation (Medical): No    • Lack of Transportation (Non-Medical):  No   Physical Activity: Not on file   Stress: Not on file   Social Connections: Not on file   Intimate Partner Violence: Not on file   Housing Stability: Low Risk  (5/30/2023)    Housing Stability Vital Sign    • Unable to Pay for Housing in the Last Year: No    • Number of Places Lived in the Last Year: 1    • Unstable Housing in the Last Year: No      Family History   Problem Relation Age of Onset   • Ulcerative colitis Mother    • Arthritis Mother    • Colon polyps Mother    • Heart failure Father    • Neuropathy Father    • Macular degeneration Father    • Diabetes Father    • Early death Father    • Vision loss Father    • Asthma Daughter    • Hypothyroidism Daughter    • Heart disease Maternal Grandmother    • Arthritis Maternal Grandmother    • No Known Problems Maternal Grandfather    • Arthritis Paternal Grandmother    • Macular degeneration Paternal Grandmother    • Vision loss Paternal Grandmother    • Lymphoma Paternal Grandfather    • Cancer Paternal Grandfather    • Early death Paternal "Grandfather    • Breast cancer Maternal Aunt    • Cancer Maternal Aunt    • Miscarriages / Stillbirths Maternal Aunt    • Heart failure Paternal Aunt    • Heart failure Paternal Aunt    • Irritable bowel syndrome Paternal Aunt    • Breast cancer Paternal Aunt    • Breast cancer additional onset Paternal [de-identified]    • Hypothyroidism Paternal Aunt    • Cancer Paternal Aunt    • No Known Problems Son    • No Known Problems Son    • Kidney disease Brother    • Depression Paternal Uncle    • Early death Paternal Uncle      Past Surgical History:   Procedure Laterality Date   • ABDOMINAL SURGERY  2017   • APPENDECTOMY     • CHOLECYSTECTOMY     • COLONOSCOPY  2019   • CYSTOSCOPY N/A 6/8/2023    Procedure: CYSTOSCOPY, mini TURBT with fulgeration;  Surgeon: Neha Benito MD;  Location: [facility];  Service: Urology   • EGD AND COLONOSCOPY N/A 09/12/2017    Procedure: EGD AND COLONOSCOPY;  Surgeon: Zully Diego MD;  Location:  GI LAB; Service: Gastroenterology   • ERCP N/A 09/15/2017    Procedure: ENDOSCOPIC RETROGRADE CHOLANGIOPANCREATOGRAPHY (ERCP); Surgeon: Yudy Vazquez MD;  Location:  GI LAB;   Service: Gastroenterology   • HYSTERECTOMY     • KNEE SURGERY Right    • LAPAROSCOPIC ENDOMETRIOSIS FULGURATION     • ORIF ANKLE FRACTURE Left    • NM ARTHRS KNE SURG W/MENISCECTOMY MED/LAT W/SHVG Left 05/12/2017    Procedure: POSSIBLE MEDIAL MENISECTOMY;  Surgeon: Shayy Veliz MD;  Location: [facility];  Service: Orthopedics   • NM ARTHRS KNEE DEBRIDEMENT/SHAVING ARTCLR CRTLG Left 05/12/2017    Procedure: KNEE ARTHROSCOPY EVALUATION, CHONDROPLASTY;  Surgeon: Shayy Veliz MD;  Location: [facility];  Service: Orthopedics   • NM LAPS ABD PRTM&OMENTUM DX W/WO SPEC BR/WA SPX N/A 12/12/2017    Procedure: LAPAROSCOPY DIAGNOSTIC  WITH LYSIS OF ADHESIONS;  Surgeon: Darshna Loza DO;  Location:  MAIN OR;  Service: General   • UPPER GASTROINTESTINAL ENDOSCOPY  2019       Current Outpatient Medications:   •  B-D 3CC LUER-SHAMEKA SYR 25GX1\" 25G " "X 1\" 3 ML MISC, , Disp: , Rfl:   •  cetirizine (ZyrTEC) 10 mg tablet, Take 20 mg by mouth daily, Disp: , Rfl:   •  Cholecalciferol 10 MCG /0 025ML LIQD, Take 1,000 Units by mouth 2 (two) times a day, Disp: 750 mL, Rfl: 1  •  cyanocobalamin 1,000 mcg/mL, , Disp: , Rfl:   •  EpiPen 2-Malachi 0 3 MG/0 3ML SOAJ, INJECT 1 PEN INTO THE MUSCLE AS NEEDED FOR ANAPHALAXIS, Disp: , Rfl:   •  famotidine (PEPCID) 40 MG tablet, TAKE ONE TABLET BY MOUTH ONCE DAILY, Disp: 90 tablet, Rfl: 3  •  hydrOXYzine HCL (ATARAX) 25 mg tablet, , Disp: , Rfl:   •  Lactobacillus (PROBIOTIC ACIDOPHILUS PO), Take by mouth, Disp: , Rfl:   •  Lactobacillus-Inulin ([facility]) CAPS, Take 200 mg by mouth daily, Disp: , Rfl:   •  levalbuterol (XOPENEX HFA) 45 mcg/act inhaler, Inhale 1-2 puffs every 4 (four) hours as needed for shortness of breath, Disp: 45 g, Rfl: 2  •  Levalbuterol HCl (XOPENEX IN), , Disp: , Rfl:   •  levothyroxine 100 mcg tablet, Take 100 mcg by mouth every other day, Disp: , Rfl:   •  liothyronine (CYTOMEL) 5 mcg tablet, Take 1 tablet (5 mcg total) by mouth daily Do not start before June 14, 2023 , Disp: 30 tablet, Rfl: 0  •  montelukast (SINGULAIR) 10 mg tablet, , Disp: , Rfl:   •  NON FORMULARY, immunoglobin sq 6g 30 ml once a week, Disp: , Rfl:   •  nystatin (MYCOSTATIN) 500,000 units/5 mL suspension, Swish and spit 5 mL (500,000 Units total) 4 (four) times a day, Disp: 473 mL, Rfl: 0  •  pantoprazole (PROTONIX) 40 mg tablet, TAKE ONE TABLET BY MOUTH TWICE A DAY, Disp: 60 tablet, Rfl: 5  •  Potassium Chloride 10 % SOLN, Take 99 mg by mouth 2 (two) times a day, Disp: , Rfl:   •  predniSONE 20 mg tablet, Take 1 tablet (20 mg total) by mouth 2 (two) times a day with meals Take as instructed by physician   20 mg three times a day for 3 days, then 20 mg twice daily, Disp: 60 tablet, Rfl: 0  •  Riboflavin (Vitamin B-2) 100 MG TABS, Take 4 tablets (400 mg total) by mouth daily, Disp: 360 tablet, Rfl: 3  •  Riboflavin " (Vitamin B-2) 100 MG TABS, Take 100 mg by mouth 4 (four) times a day, Disp: , Rfl:   •  TechLite Lancets MISC, , Disp: , Rfl:   •  butalbital-acetaminophen-caffeine (FIORICET,ESGIC) -40 mg per tablet, Take 1 tablet by mouth every 8 (eight) hours as needed for migraine, Disp: 20 tablet, Rfl: 1  •  diphenhydrAMINE (BENADRYL) 50 mg/mL, Inject 1 mL (50 mg total) into a muscle every 6 (six) hours as needed for itching or allergies, Disp: 20 mL, Rfl: 2  •  LORazepam (ATIVAN) 1 mg tablet, , Disp: , Rfl:   •  naloxone (NARCAN) 4 mg/0 1 mL nasal spray, Administer 1 spray into a nostril  If no response after 2-3 minutes, give another dose in the other nostril using a new spray , Disp: 1 each, Rfl: 1  •  omalizumab (XOLAIR) 150 mg, Inject 300 mg under the skin every 21 days (Patient not taking: Reported on 6/23/2023), Disp: , Rfl:   •  traMADol (Ultram) 50 mg tablet, Take 1 tablet (50 mg total) by mouth every 12 (twelve) hours as needed for moderate pain or severe pain, Disp: 60 tablet, Rfl: 0  No current facility-administered medications for this visit    Allergies   Allergen Reactions   • Aimovig [Erenumab-Aooe] Anaphylaxis   • Amitriptyline Anaphylaxis     According to the patient she had nphylaxis   • Aspergillus Allergy Skin Test Other (See Comments), Hives and Swelling   • Azathioprine Other (See Comments) and Anaphylaxis     pancreatitis  According to patient she needed Epipen   • Banana - Food Allergy Itching, Swelling and Anaphylaxis   • Buspar [Buspirone] Hives and Shortness Of Breath   • Citric Acid-Polysorbate 80 Abdominal Pain, Anaphylaxis, Anxiety, Bradycardia, Diarrhea, Fatigue, GI Intolerance, Headache, Hives, Hyperactivity, Itching, Lip Swelling, Nausea Only, Palpitations, Rash, Shortness Of Breath, Tachycardia, Throat Swelling, Tongue Swelling and Vomiting   • Corn Dextrin Anaphylaxis     Any corn based products   • Eggs Or Egg-Derived Products - Food Allergy Anaphylaxis   • Epinephrine Anaphylaxis   • "Erenumab Anaphylaxis     patient sent portal message stating she had anaphylaxis  patient sent portal message stating she had anaphylaxis     • Gadolinium Abdominal Pain, Anaphylaxis, Anxiety, Confusion, Delirium, Dizziness, Eye Swelling, Fatigue, GI Intolerance, Headache, Hives, Irritability, Itching, Lip Swelling, Nausea Only, Palpitations, Photosensitivity, Rash, Seizures, Shortness Of Breath, Swelling, Syncope, Throat Swelling, Tongue Swelling, Tremor, Visual Disturbance and Vomiting   • Gelatin - Food Allergy Anaphylaxis   • Heparin Hives     Painful welts    • Lactated Ringers Shortness Of Breath     Pt questions this allergy, pt has received LR multiple times without reaction   • Lavender Oil Anaphylaxis     Other reaction(s): anaphalxis   • Malto Dextrin [Dextrin] Anaphylaxis   • Other Anaphylaxis     Gel caps, maltodextrose, dextrose,grapes, lavender    • Penicillium Notatum Shortness Of Breath and Swelling   • Sumatriptan Anaphylaxis     Bradycardia, sob, back pressure, head pain,throat tightness  According to the patient she had anphylaxis   • Ustekinumab Anaphylaxis   • Contrast [Iodinated Contrast Media] Other (See Comments)     Pt states needs to be premedicated prior to injection   • Corn Oil - Food Allergy - Food Allergy Hives   • Humira [Adalimumab] Other (See Comments)     \"I develop drug induced lupus\"   • Ibuprofen Hives and Throat Swelling   • Polyethylene Glycol Diarrhea and Vomiting   • Red Dye - Food Allergy Other (See Comments)     intolerance   • Remicade [Infliximab] Other (See Comments)     \"I develop drug induced lupus\"   • Soy Allergy - Food Allergy Other (See Comments)   • Sulfa Antibiotics Hives and Other (See Comments)   • Sulfate Hives   • Sulfites - Food Allergy Hives   • Chlorhexidine Itching   • Freederm Adhesive Remover [New Skin] Rash   • Histamine Hives, Rash and Other (See Comments)     Intolerance         Labs:     Chemistry        Component Value Date/Time     " "01/16/2015 1213    K 4 1 06/13/2023 0622    K 4 2 01/16/2015 1213    CL 95 (L) 06/13/2023 0622     01/16/2015 1213    CO2 30 06/13/2023 0622    CO2 29 1 01/16/2015 1213    BUN 25 06/13/2023 0622    BUN 10 01/16/2015 1213    CREATININE 0 70 06/13/2023 0622    CREATININE 0 67 01/16/2015 1213        Component Value Date/Time    CALCIUM 9 1 06/13/2023 0622    CALCIUM 9 6 01/16/2015 1213    ALKPHOS 52 06/12/2023 1012    ALKPHOS 97 01/16/2015 1213    AST 17 06/12/2023 1012    AST 11 01/16/2015 1213    ALT 43 06/12/2023 1012    ALT 51 01/16/2015 1213    BILITOT 0 7 01/16/2015 1213            No results found for: \"CHOL\"  Lab Results   Component Value Date    HDL 77 03/12/2022    HDL 75 10/25/2019    HDL 54 02/13/2019     Lab Results   Component Value Date    LDLCALC 105 (H) 03/12/2022    LDLCALC 115 (H) 10/25/2019    LDLCALC 62 02/13/2019     Lab Results   Component Value Date    TRIG 69 03/12/2022    TRIG 52 10/25/2019    TRIG 84 02/13/2019     No results found for: \"CHOLHDL\"    Imaging: No results found  ECG:  Normal sinus rhythm unremarkable tracing      ROS    Vitals:    06/23/23 0807   BP: 124/80   Pulse: 96   SpO2: 99%     Vitals:    06/23/23 0807   Weight: 71 2 kg (157 lb)     Height: 5' 5\" (165 1 cm)   Body mass index is 26 13 kg/m²  Physical Exam:  Vital signs reviewed  General:  Alert and cooperative, appears stated age, no acute distress  HEENT:  PERRLA, EOMI, no scleral icterus, no conjunctival pallor  Neck:  No lymphadenopathy, no thyromegaly, no carotid bruits, no elevated JVP  Heart:  Regular rate and rhythm, normal S1/S2, no S3/S4, no murmur, rubs or gallops  PMI nondisplaced  Lungs:  Clear to auscultation bilaterally, no wheezes rales or rhonchi  Abdomen:  Soft, non-tender, positive bowel sounds, no rebound or guarding,   no organomegaly   Extremities:  Normal range of motion    No clubbing, cyanosis or edema   Vascular:  2+ pedal pulses  Skin:  No rashes or lesions on exposed skin  Neurologic: " Cranial nerves II-XII grossly intact without focal deficits  Psych:  Normal mood and affect

## 2023-06-26 ENCOUNTER — HOSPITAL ENCOUNTER (OUTPATIENT)
Dept: INFUSION CENTER | Facility: HOSPITAL | Age: 48
Discharge: HOME | End: 2023-06-26
Attending: INTERNAL MEDICINE
Payer: COMMERCIAL

## 2023-06-26 VITALS
HEART RATE: 132 BPM | DIASTOLIC BLOOD PRESSURE: 78 MMHG | RESPIRATION RATE: 18 BRPM | OXYGEN SATURATION: 95 % | SYSTOLIC BLOOD PRESSURE: 110 MMHG | TEMPERATURE: 98.2 F

## 2023-06-26 DIAGNOSIS — R63.0 ANOREXIA: Primary | ICD-10-CM

## 2023-06-26 DIAGNOSIS — E44.1 MILD PROTEIN-CALORIE MALNUTRITION (HCC): ICD-10-CM

## 2023-06-26 DIAGNOSIS — R19.7 VOMITING AND DIARRHEA: ICD-10-CM

## 2023-06-26 DIAGNOSIS — R11.10 VOMITING AND DIARRHEA: ICD-10-CM

## 2023-06-26 PROCEDURE — C9113 INJ PANTOPRAZOLE SODIUM, VIA: HCPCS | Performed by: INTERNAL MEDICINE

## 2023-06-26 RX ORDER — PANTOPRAZOLE SODIUM 40 MG/10ML
40 INJECTION, POWDER, LYOPHILIZED, FOR SOLUTION INTRAVENOUS ONCE
Status: COMPLETED | OUTPATIENT
Start: 2023-06-26 | End: 2023-06-26

## 2023-06-26 RX ORDER — METHYLPREDNISOLONE SODIUM SUCCINATE 125 MG/2ML
60 INJECTION, POWDER, LYOPHILIZED, FOR SOLUTION INTRAMUSCULAR; INTRAVENOUS ONCE
Status: COMPLETED | OUTPATIENT
Start: 2023-06-26 | End: 2023-06-26

## 2023-06-26 RX ORDER — METHYLPREDNISOLONE SODIUM SUCCINATE 125 MG/2ML
60 INJECTION, POWDER, LYOPHILIZED, FOR SOLUTION INTRAMUSCULAR; INTRAVENOUS ONCE
Status: CANCELLED
Start: 2023-06-27 | End: 2023-06-27

## 2023-06-26 RX ORDER — LORAZEPAM 2 MG/ML
1 INJECTION INTRAMUSCULAR ONCE AS NEEDED
Status: CANCELLED
Start: 2023-06-27

## 2023-06-26 RX ORDER — PANTOPRAZOLE SODIUM 40 MG/10ML
40 INJECTION, POWDER, LYOPHILIZED, FOR SOLUTION INTRAVENOUS ONCE
Status: CANCELLED
Start: 2023-06-27 | End: 2023-06-27

## 2023-06-26 RX ORDER — LORAZEPAM 2 MG/ML
1 INJECTION INTRAMUSCULAR ONCE AS NEEDED
Status: COMPLETED | OUTPATIENT
Start: 2023-06-26 | End: 2023-06-26

## 2023-06-26 RX ADMIN — PANTOPRAZOLE SODIUM 40 MG: 40 INJECTION, POWDER, FOR SOLUTION INTRAVENOUS at 14:05

## 2023-06-26 RX ADMIN — METHYLPREDNISOLONE SODIUM SUCCINATE 60 MG: 125 INJECTION, POWDER, FOR SOLUTION INTRAMUSCULAR; INTRAVENOUS at 13:21

## 2023-06-26 RX ADMIN — FAMOTIDINE 40 MG: 10 INJECTION, SOLUTION INTRAVENOUS at 13:39

## 2023-06-26 RX ADMIN — DIPHENHYDRAMINE HYDROCHLORIDE 50 MG: 50 INJECTION, SOLUTION INTRAMUSCULAR; INTRAVENOUS at 12:47

## 2023-06-26 RX ADMIN — SODIUM CHLORIDE 1500 ML: 0.9 INJECTION, SOLUTION INTRAVENOUS at 12:20

## 2023-06-26 RX ADMIN — LORAZEPAM 1 MG: 2 INJECTION INTRAMUSCULAR; INTRAVENOUS at 12:38

## 2023-06-27 ENCOUNTER — HOSPITAL ENCOUNTER (OUTPATIENT)
Dept: INFUSION CENTER | Facility: HOSPITAL | Age: 48
End: 2023-06-27
Attending: INTERNAL MEDICINE

## 2023-06-27 ENCOUNTER — DOCUMENTATION (OUTPATIENT)
Dept: FAMILY MEDICINE CLINIC | Facility: CLINIC | Age: 48
End: 2023-06-27

## 2023-06-28 ENCOUNTER — HOSPITAL ENCOUNTER (OUTPATIENT)
Dept: INFUSION CENTER | Facility: HOSPITAL | Age: 48
Discharge: HOME | End: 2023-06-28
Attending: INTERNAL MEDICINE
Payer: COMMERCIAL

## 2023-06-28 VITALS
TEMPERATURE: 98.5 F | RESPIRATION RATE: 17 BRPM | OXYGEN SATURATION: 97 % | HEART RATE: 94 BPM | SYSTOLIC BLOOD PRESSURE: 134 MMHG | DIASTOLIC BLOOD PRESSURE: 89 MMHG

## 2023-06-28 DIAGNOSIS — R63.0 ANOREXIA: Primary | ICD-10-CM

## 2023-06-28 DIAGNOSIS — E44.1 MILD PROTEIN-CALORIE MALNUTRITION (HCC): ICD-10-CM

## 2023-06-28 DIAGNOSIS — R19.7 VOMITING AND DIARRHEA: ICD-10-CM

## 2023-06-28 DIAGNOSIS — R11.10 VOMITING AND DIARRHEA: ICD-10-CM

## 2023-06-28 PROCEDURE — C9113 INJ PANTOPRAZOLE SODIUM, VIA: HCPCS | Performed by: INTERNAL MEDICINE

## 2023-06-28 RX ORDER — METHYLPREDNISOLONE SODIUM SUCCINATE 125 MG/2ML
60 INJECTION, POWDER, LYOPHILIZED, FOR SOLUTION INTRAMUSCULAR; INTRAVENOUS ONCE
Status: COMPLETED | OUTPATIENT
Start: 2023-06-28 | End: 2023-06-28

## 2023-06-28 RX ORDER — LORAZEPAM 2 MG/ML
1 INJECTION INTRAMUSCULAR ONCE AS NEEDED
Status: CANCELLED
Start: 2023-06-29

## 2023-06-28 RX ORDER — METHYLPREDNISOLONE SODIUM SUCCINATE 125 MG/2ML
60 INJECTION, POWDER, LYOPHILIZED, FOR SOLUTION INTRAMUSCULAR; INTRAVENOUS ONCE
Status: CANCELLED
Start: 2023-06-29 | End: 2023-06-29

## 2023-06-28 RX ORDER — PANTOPRAZOLE SODIUM 40 MG/10ML
40 INJECTION, POWDER, LYOPHILIZED, FOR SOLUTION INTRAVENOUS ONCE
Status: COMPLETED | OUTPATIENT
Start: 2023-06-28 | End: 2023-06-28

## 2023-06-28 RX ORDER — LORAZEPAM 2 MG/ML
1 INJECTION INTRAMUSCULAR ONCE AS NEEDED
Status: COMPLETED | OUTPATIENT
Start: 2023-06-28 | End: 2023-06-28

## 2023-06-28 RX ORDER — PANTOPRAZOLE SODIUM 40 MG/10ML
40 INJECTION, POWDER, LYOPHILIZED, FOR SOLUTION INTRAVENOUS ONCE
Status: CANCELLED
Start: 2023-06-29 | End: 2023-06-29

## 2023-06-28 RX ADMIN — PANTOPRAZOLE SODIUM 40 MG: 40 INJECTION, POWDER, FOR SOLUTION INTRAVENOUS at 15:22

## 2023-06-28 RX ADMIN — LORAZEPAM 1 MG: 2 INJECTION INTRAMUSCULAR; INTRAVENOUS at 13:20

## 2023-06-28 RX ADMIN — SODIUM CHLORIDE 1500 ML: 0.9 INJECTION, SOLUTION INTRAVENOUS at 13:08

## 2023-06-28 RX ADMIN — FAMOTIDINE 40 MG: 10 INJECTION, SOLUTION INTRAVENOUS at 14:51

## 2023-06-28 RX ADMIN — DIPHENHYDRAMINE HYDROCHLORIDE 50 MG: 50 INJECTION, SOLUTION INTRAMUSCULAR; INTRAVENOUS at 13:32

## 2023-06-28 RX ADMIN — METHYLPREDNISOLONE SODIUM SUCCINATE 60 MG: 125 INJECTION, POWDER, FOR SOLUTION INTRAMUSCULAR; INTRAVENOUS at 14:29

## 2023-06-29 ENCOUNTER — HOSPITAL ENCOUNTER (OUTPATIENT)
Dept: INFUSION CENTER | Facility: HOSPITAL | Age: 48
Discharge: HOME | End: 2023-06-29
Attending: INTERNAL MEDICINE
Payer: COMMERCIAL

## 2023-06-29 VITALS
DIASTOLIC BLOOD PRESSURE: 55 MMHG | TEMPERATURE: 98.4 F | SYSTOLIC BLOOD PRESSURE: 109 MMHG | RESPIRATION RATE: 17 BRPM | HEART RATE: 58 BPM

## 2023-06-29 DIAGNOSIS — R19.7 VOMITING AND DIARRHEA: ICD-10-CM

## 2023-06-29 DIAGNOSIS — R11.10 VOMITING AND DIARRHEA: ICD-10-CM

## 2023-06-29 DIAGNOSIS — E44.1 MILD PROTEIN-CALORIE MALNUTRITION (HCC): ICD-10-CM

## 2023-06-29 DIAGNOSIS — R63.0 ANOREXIA: Primary | ICD-10-CM

## 2023-06-29 PROCEDURE — 96361 HYDRATE IV INFUSION ADD-ON: CPT

## 2023-06-29 PROCEDURE — 96375 TX/PRO/DX INJ NEW DRUG ADDON: CPT

## 2023-06-29 PROCEDURE — 96365 THER/PROPH/DIAG IV INF INIT: CPT

## 2023-06-29 PROCEDURE — 96367 TX/PROPH/DG ADDL SEQ IV INF: CPT

## 2023-06-29 PROCEDURE — C9113 INJ PANTOPRAZOLE SODIUM, VIA: HCPCS | Performed by: INTERNAL MEDICINE

## 2023-06-29 RX ORDER — LORAZEPAM 2 MG/ML
1 INJECTION INTRAMUSCULAR ONCE AS NEEDED
Status: CANCELLED
Start: 2023-06-30

## 2023-06-29 RX ORDER — METHYLPREDNISOLONE SODIUM SUCCINATE 125 MG/2ML
60 INJECTION, POWDER, LYOPHILIZED, FOR SOLUTION INTRAMUSCULAR; INTRAVENOUS ONCE
Status: COMPLETED | OUTPATIENT
Start: 2023-06-29 | End: 2023-06-29

## 2023-06-29 RX ORDER — PANTOPRAZOLE SODIUM 40 MG/10ML
40 INJECTION, POWDER, LYOPHILIZED, FOR SOLUTION INTRAVENOUS ONCE
Status: COMPLETED | OUTPATIENT
Start: 2023-06-29 | End: 2023-06-29

## 2023-06-29 RX ORDER — LORAZEPAM 2 MG/ML
1 INJECTION INTRAMUSCULAR ONCE AS NEEDED
Status: COMPLETED | OUTPATIENT
Start: 2023-06-29 | End: 2023-06-29

## 2023-06-29 RX ORDER — METHYLPREDNISOLONE SODIUM SUCCINATE 125 MG/2ML
60 INJECTION, POWDER, LYOPHILIZED, FOR SOLUTION INTRAMUSCULAR; INTRAVENOUS ONCE
Status: CANCELLED
Start: 2023-06-30 | End: 2023-06-30

## 2023-06-29 RX ORDER — PANTOPRAZOLE SODIUM 40 MG/10ML
40 INJECTION, POWDER, LYOPHILIZED, FOR SOLUTION INTRAVENOUS ONCE
Status: CANCELLED
Start: 2023-06-30 | End: 2023-06-30

## 2023-06-29 RX ADMIN — METHYLPREDNISOLONE SODIUM SUCCINATE 60 MG: 125 INJECTION, POWDER, FOR SOLUTION INTRAMUSCULAR; INTRAVENOUS at 10:06

## 2023-06-29 RX ADMIN — FAMOTIDINE 40 MG: 10 INJECTION, SOLUTION INTRAVENOUS at 10:10

## 2023-06-29 RX ADMIN — DIPHENHYDRAMINE HYDROCHLORIDE 50 MG: 50 INJECTION, SOLUTION INTRAMUSCULAR; INTRAVENOUS at 09:22

## 2023-06-29 RX ADMIN — SODIUM CHLORIDE 1500 ML: 0.9 INJECTION, SOLUTION INTRAVENOUS at 08:40

## 2023-06-29 RX ADMIN — LORAZEPAM 1 MG: 2 INJECTION INTRAMUSCULAR; INTRAVENOUS at 09:01

## 2023-06-29 RX ADMIN — PANTOPRAZOLE SODIUM 40 MG: 40 INJECTION, POWDER, FOR SOLUTION INTRAVENOUS at 10:50

## 2023-06-29 NOTE — PLAN OF CARE
Problem: INFECTION - ADULT  Goal: Absence of fever/infection during neutropenic period  Description: INTERVENTIONS:  - Monitor WBC    6/29/2023 1429 by James iKmble RN  Outcome: Progressing  6/29/2023 0947 by James Kimble RN  Outcome: Progressing

## 2023-06-30 ENCOUNTER — HOSPITAL ENCOUNTER (OUTPATIENT)
Dept: INFUSION CENTER | Facility: HOSPITAL | Age: 48
End: 2023-06-30
Attending: INTERNAL MEDICINE
Payer: COMMERCIAL

## 2023-06-30 VITALS
OXYGEN SATURATION: 99 % | SYSTOLIC BLOOD PRESSURE: 122 MMHG | HEART RATE: 110 BPM | RESPIRATION RATE: 16 BRPM | TEMPERATURE: 98.5 F | DIASTOLIC BLOOD PRESSURE: 73 MMHG

## 2023-06-30 DIAGNOSIS — R19.7 VOMITING AND DIARRHEA: ICD-10-CM

## 2023-06-30 DIAGNOSIS — R11.10 VOMITING AND DIARRHEA: ICD-10-CM

## 2023-06-30 DIAGNOSIS — E44.1 MILD PROTEIN-CALORIE MALNUTRITION (HCC): ICD-10-CM

## 2023-06-30 DIAGNOSIS — R63.0 ANOREXIA: Primary | ICD-10-CM

## 2023-06-30 PROCEDURE — 96367 TX/PROPH/DG ADDL SEQ IV INF: CPT

## 2023-06-30 PROCEDURE — 96375 TX/PRO/DX INJ NEW DRUG ADDON: CPT

## 2023-06-30 PROCEDURE — 96361 HYDRATE IV INFUSION ADD-ON: CPT

## 2023-06-30 PROCEDURE — 96365 THER/PROPH/DIAG IV INF INIT: CPT

## 2023-06-30 PROCEDURE — C9113 INJ PANTOPRAZOLE SODIUM, VIA: HCPCS | Performed by: INTERNAL MEDICINE

## 2023-06-30 RX ORDER — PANTOPRAZOLE SODIUM 40 MG/10ML
40 INJECTION, POWDER, LYOPHILIZED, FOR SOLUTION INTRAVENOUS ONCE
Status: CANCELLED
Start: 2023-07-03 | End: 2023-07-01

## 2023-06-30 RX ORDER — METHYLPREDNISOLONE SODIUM SUCCINATE 125 MG/2ML
60 INJECTION, POWDER, LYOPHILIZED, FOR SOLUTION INTRAMUSCULAR; INTRAVENOUS ONCE
Status: COMPLETED | OUTPATIENT
Start: 2023-06-30 | End: 2023-06-30

## 2023-06-30 RX ORDER — METHYLPREDNISOLONE SODIUM SUCCINATE 125 MG/2ML
60 INJECTION, POWDER, LYOPHILIZED, FOR SOLUTION INTRAMUSCULAR; INTRAVENOUS ONCE
Status: CANCELLED
Start: 2023-07-03 | End: 2023-07-01

## 2023-06-30 RX ORDER — PANTOPRAZOLE SODIUM 40 MG/10ML
40 INJECTION, POWDER, LYOPHILIZED, FOR SOLUTION INTRAVENOUS ONCE
Status: COMPLETED | OUTPATIENT
Start: 2023-06-30 | End: 2023-06-30

## 2023-06-30 RX ORDER — LORAZEPAM 2 MG/ML
1 INJECTION INTRAMUSCULAR ONCE AS NEEDED
Status: COMPLETED | OUTPATIENT
Start: 2023-06-30 | End: 2023-06-30

## 2023-06-30 RX ORDER — LORAZEPAM 2 MG/ML
1 INJECTION INTRAMUSCULAR ONCE AS NEEDED
Status: CANCELLED
Start: 2023-07-03

## 2023-06-30 RX ADMIN — METHYLPREDNISOLONE SODIUM SUCCINATE 60 MG: 125 INJECTION, POWDER, FOR SOLUTION INTRAMUSCULAR; INTRAVENOUS at 11:07

## 2023-06-30 RX ADMIN — LORAZEPAM 1 MG: 2 INJECTION INTRAMUSCULAR; INTRAVENOUS at 10:17

## 2023-06-30 RX ADMIN — SODIUM CHLORIDE 1500 ML: 0.9 INJECTION, SOLUTION INTRAVENOUS at 09:52

## 2023-06-30 RX ADMIN — FAMOTIDINE 40 MG: 10 INJECTION, SOLUTION INTRAVENOUS at 11:16

## 2023-06-30 RX ADMIN — DIPHENHYDRAMINE HYDROCHLORIDE 50 MG: 50 INJECTION, SOLUTION INTRAMUSCULAR; INTRAVENOUS at 10:28

## 2023-06-30 RX ADMIN — PANTOPRAZOLE SODIUM 40 MG: 40 INJECTION, POWDER, FOR SOLUTION INTRAVENOUS at 12:19

## 2023-07-03 ENCOUNTER — HOSPITAL ENCOUNTER (OUTPATIENT)
Dept: INFUSION CENTER | Facility: HOSPITAL | Age: 48
End: 2023-07-03
Attending: INTERNAL MEDICINE

## 2023-07-06 ENCOUNTER — HOSPITAL ENCOUNTER (OUTPATIENT)
Dept: INFUSION CENTER | Facility: HOSPITAL | Age: 48
Discharge: HOME | End: 2023-07-06

## 2023-07-06 NOTE — PROGRESS NOTES
Received voicemail from pt to cancel appt today as she has a fever and is trying to get in touch with PCP.

## 2023-07-07 ENCOUNTER — TELEPHONE (OUTPATIENT)
Dept: UROLOGY | Facility: CLINIC | Age: 48
End: 2023-07-07

## 2023-07-07 DIAGNOSIS — R31.0 GROSS HEMATURIA: Primary | ICD-10-CM

## 2023-07-07 RX ORDER — CEPHALEXIN 500 MG/1
500 CAPSULE ORAL EVERY 6 HOURS SCHEDULED
Qty: 20 CAPSULE | Refills: 0 | Status: SHIPPED | OUTPATIENT
Start: 2023-07-07 | End: 2023-07-13

## 2023-07-07 NOTE — TELEPHONE ENCOUNTER
Reviewed mychart message from pt today as well.  Reporting low grade fevers dysuria and hematuria  Multiple allergies but looks like keflex will be OK  [facility]- Please have her go to lab for a specimen before first dose

## 2023-07-07 NOTE — TELEPHONE ENCOUNTER
I spoke with the patient and provided her with this information. Will monitor for urine testing results.

## 2023-07-07 NOTE — TELEPHONE ENCOUNTER
The patient has greater than 35 documented allergies it is best to wait for results of urine culture to be received before deciding on course of therapy

## 2023-07-07 NOTE — TELEPHONE ENCOUNTER
Pt called and stated she 6/4/23 she had a CYSTOSCOPY, mini TURBT with fulgeration. She is still having complications such as occasional blood in the urine , painful urination. Pain when driving and hitting a pot hole. Fever and chills on and off.      Please advise   Reviewed ER precautions with pt

## 2023-07-07 NOTE — TELEPHONE ENCOUNTER
Carlos Blood from [address] [place] [address] calling about prescription sent for keflex.     She stated patient has an allergy to Gelatin and most capsules contain gelatin    Any questions, milli can be reached at [phone]

## 2023-07-09 ENCOUNTER — APPOINTMENT (OUTPATIENT)
Dept: LAB | Facility: HOSPITAL | Age: 48
End: 2023-07-09
Payer: COMMERCIAL

## 2023-07-09 PROCEDURE — 81001 URINALYSIS AUTO W/SCOPE: CPT

## 2023-07-10 ENCOUNTER — APPOINTMENT (OUTPATIENT)
Dept: LAB | Facility: HOSPITAL | Age: 48
End: 2023-07-10

## 2023-07-10 ENCOUNTER — TELEPHONE (OUTPATIENT)
Dept: UROLOGY | Facility: MEDICAL CENTER | Age: 48
End: 2023-07-10

## 2023-07-10 DIAGNOSIS — R31.0 GROSS HEMATURIA: ICD-10-CM

## 2023-07-10 LAB
BACTERIA UR QL AUTO: ABNORMAL /HPF
BILIRUB UR QL STRIP: NEGATIVE
CLARITY UR: ABNORMAL
COLOR UR: YELLOW
GLUCOSE UR STRIP-MCNC: NEGATIVE MG/DL
HGB UR QL STRIP.AUTO: ABNORMAL
KETONES UR STRIP-MCNC: NEGATIVE MG/DL
LEUKOCYTE ESTERASE UR QL STRIP: ABNORMAL
NITRITE UR QL STRIP: NEGATIVE
NON-SQ EPI CELLS URNS QL MICRO: ABNORMAL /HPF
PH UR STRIP.AUTO: 5.5 [PH]
PROT UR STRIP-MCNC: ABNORMAL MG/DL
RBC #/AREA URNS AUTO: ABNORMAL /HPF
SP GR UR STRIP.AUTO: 1.02 (ref 1–1.03)
UROBILINOGEN UR QL STRIP.AUTO: 0.2 E.U./DL
WBC #/AREA URNS AUTO: ABNORMAL /HPF

## 2023-07-13 ENCOUNTER — HOSPITAL ENCOUNTER (OUTPATIENT)
Dept: INFUSION CENTER | Facility: HOSPITAL | Age: 48
Discharge: HOME | End: 2023-07-13
Attending: INTERNAL MEDICINE
Payer: COMMERCIAL

## 2023-07-13 DIAGNOSIS — R63.0 ANOREXIA: Primary | ICD-10-CM

## 2023-07-13 DIAGNOSIS — R11.10 VOMITING AND DIARRHEA: ICD-10-CM

## 2023-07-13 DIAGNOSIS — E44.1 MILD PROTEIN-CALORIE MALNUTRITION (HCC): ICD-10-CM

## 2023-07-13 DIAGNOSIS — R19.7 VOMITING AND DIARRHEA: ICD-10-CM

## 2023-07-13 PROCEDURE — 96361 HYDRATE IV INFUSION ADD-ON: CPT

## 2023-07-13 PROCEDURE — 96375 TX/PRO/DX INJ NEW DRUG ADDON: CPT

## 2023-07-13 PROCEDURE — C9113 INJ PANTOPRAZOLE SODIUM, VIA: HCPCS | Performed by: INTERNAL MEDICINE

## 2023-07-13 PROCEDURE — 96365 THER/PROPH/DIAG IV INF INIT: CPT

## 2023-07-13 RX ORDER — METHYLPREDNISOLONE SODIUM SUCCINATE 125 MG/2ML
60 INJECTION, POWDER, LYOPHILIZED, FOR SOLUTION INTRAMUSCULAR; INTRAVENOUS ONCE
Status: COMPLETED | OUTPATIENT
Start: 2023-07-13 | End: 2023-07-13

## 2023-07-13 RX ORDER — PANTOPRAZOLE SODIUM 40 MG/10ML
40 INJECTION, POWDER, LYOPHILIZED, FOR SOLUTION INTRAVENOUS ONCE
Status: COMPLETED | OUTPATIENT
Start: 2023-07-13 | End: 2023-07-13

## 2023-07-13 RX ORDER — PANTOPRAZOLE SODIUM 40 MG/10ML
40 INJECTION, POWDER, LYOPHILIZED, FOR SOLUTION INTRAVENOUS ONCE
Start: 2023-07-13 | End: 2023-07-14

## 2023-07-13 RX ORDER — METHYLPREDNISOLONE SODIUM SUCCINATE 125 MG/2ML
60 INJECTION, POWDER, LYOPHILIZED, FOR SOLUTION INTRAMUSCULAR; INTRAVENOUS ONCE
Start: 2023-07-13 | End: 2023-07-14

## 2023-07-13 RX ORDER — LORAZEPAM 2 MG/ML
1 INJECTION INTRAMUSCULAR ONCE AS NEEDED
Start: 2023-07-17

## 2023-07-13 RX ORDER — LORAZEPAM 2 MG/ML
1 INJECTION INTRAMUSCULAR ONCE AS NEEDED
Status: COMPLETED | OUTPATIENT
Start: 2023-07-13 | End: 2023-07-13

## 2023-07-13 RX ADMIN — METHYLPREDNISOLONE SODIUM SUCCINATE 60 MG: 125 INJECTION, POWDER, FOR SOLUTION INTRAMUSCULAR; INTRAVENOUS at 12:42

## 2023-07-13 RX ADMIN — PANTOPRAZOLE SODIUM 40 MG: 40 INJECTION, POWDER, FOR SOLUTION INTRAVENOUS at 12:49

## 2023-07-13 RX ADMIN — DIPHENHYDRAMINE HYDROCHLORIDE 50 MG: 50 INJECTION, SOLUTION INTRAMUSCULAR; INTRAVENOUS at 12:07

## 2023-07-13 RX ADMIN — SODIUM CHLORIDE 1500 ML: 0.9 INJECTION, SOLUTION INTRAVENOUS at 11:53

## 2023-07-13 RX ADMIN — LORAZEPAM 1 MG: 2 INJECTION INTRAMUSCULAR; INTRAVENOUS at 11:59

## 2023-07-13 RX ADMIN — FAMOTIDINE 40 MG: 10 INJECTION, SOLUTION INTRAVENOUS at 12:57

## 2023-07-13 NOTE — PLAN OF CARE
Problem: Potential for Falls  Goal: Patient will remain free of falls  Description: INTERVENTIONS:  - Educate patient/family on patient safety including physical limitations  - Instruct patient to call for assistance with activity   - Consult OT/PT to assist with strengthening/mobility   - Keep Call bell within reach  - K    - Consider moving patient to room near nurses station  Outcome: Progressing     Problem: Knowledge Deficit  Goal: Patient/family/caregiver demonstrates understanding of disease process, treatment plan, medications, and discharge instructions  Description: Complete learning assessment and assess knowledge base.   Interventions:  - Provide teaching at level of understanding  - Provide teaching via preferred learning methods  Outcome: Progressing

## 2023-07-17 DIAGNOSIS — N39.0 RECURRENT UTI: Primary | ICD-10-CM

## 2023-07-17 DIAGNOSIS — L50.1 CHRONIC IDIOPATHIC URTICARIA: Primary | ICD-10-CM

## 2023-07-18 ENCOUNTER — APPOINTMENT (EMERGENCY)
Dept: CT IMAGING | Facility: HOSPITAL | Age: 48
DRG: 815 | End: 2023-07-18
Payer: COMMERCIAL

## 2023-07-18 ENCOUNTER — HOSPITAL ENCOUNTER (INPATIENT)
Facility: HOSPITAL | Age: 48
LOS: 4 days | DRG: 815 | End: 2023-07-24
Attending: EMERGENCY MEDICINE | Admitting: INTERNAL MEDICINE
Payer: COMMERCIAL

## 2023-07-18 ENCOUNTER — APPOINTMENT (EMERGENCY)
Dept: RADIOLOGY | Facility: HOSPITAL | Age: 48
DRG: 815 | End: 2023-07-18
Payer: COMMERCIAL

## 2023-07-18 DIAGNOSIS — R10.9 ABDOMINAL PAIN: ICD-10-CM

## 2023-07-18 DIAGNOSIS — Z00.8 MEDICAL CLEARANCE FOR PSYCHIATRIC ADMISSION: ICD-10-CM

## 2023-07-18 DIAGNOSIS — R11.0 NAUSEA: ICD-10-CM

## 2023-07-18 DIAGNOSIS — N20.0 KIDNEY STONES: ICD-10-CM

## 2023-07-18 DIAGNOSIS — G43.909 MIGRAINE: ICD-10-CM

## 2023-07-18 DIAGNOSIS — J98.11 ATELECTASIS: ICD-10-CM

## 2023-07-18 DIAGNOSIS — K42.9 UMBILICAL HERNIA: ICD-10-CM

## 2023-07-18 DIAGNOSIS — R45.851 PASSIVE SUICIDAL IDEATIONS: ICD-10-CM

## 2023-07-18 DIAGNOSIS — R41.82 AMS (ALTERED MENTAL STATUS): Primary | ICD-10-CM

## 2023-07-18 DIAGNOSIS — F13.10 BENZODIAZEPINE ABUSE (HCC): ICD-10-CM

## 2023-07-18 DIAGNOSIS — R56.9 SEIZURE-LIKE ACTIVITY (HCC): ICD-10-CM

## 2023-07-18 LAB
2HR DELTA HS TROPONIN: 1 NG/L
ALBUMIN SERPL BCP-MCNC: 4.2 G/DL (ref 3.5–5)
ALP SERPL-CCNC: 57 U/L (ref 34–104)
ALT SERPL W P-5'-P-CCNC: 31 U/L (ref 7–52)
ANION GAP SERPL CALCULATED.3IONS-SCNC: 14 MMOL/L
APAP SERPL-MCNC: <10 UG/ML (ref 10–20)
AST SERPL W P-5'-P-CCNC: 29 U/L (ref 13–39)
BASOPHILS # BLD AUTO: 0.05 THOUSANDS/ÂΜL (ref 0–0.1)
BASOPHILS NFR BLD AUTO: 1 % (ref 0–1)
BILIRUB SERPL-MCNC: 0.43 MG/DL (ref 0.2–1)
BUN SERPL-MCNC: 6 MG/DL (ref 5–25)
CALCIUM SERPL-MCNC: 9.5 MG/DL (ref 8.4–10.2)
CARDIAC TROPONIN I PNL SERPL HS: 2 NG/L
CARDIAC TROPONIN I PNL SERPL HS: 3 NG/L
CHLORIDE SERPL-SCNC: 102 MMOL/L (ref 96–108)
CK SERPL-CCNC: 56 U/L (ref 26–192)
CO2 SERPL-SCNC: 25 MMOL/L (ref 21–32)
CREAT SERPL-MCNC: 0.66 MG/DL (ref 0.6–1.3)
EOSINOPHIL # BLD AUTO: 0 THOUSAND/ÂΜL (ref 0–0.61)
EOSINOPHIL NFR BLD AUTO: 0 % (ref 0–6)
ERYTHROCYTE [DISTWIDTH] IN BLOOD BY AUTOMATED COUNT: 15.3 % (ref 11.6–15.1)
ETHANOL SERPL-MCNC: <10 MG/DL
GFR SERPL CREATININE-BSD FRML MDRD: 103 ML/MIN/1.73SQ M
GLUCOSE SERPL-MCNC: 155 MG/DL (ref 65–140)
HCT VFR BLD AUTO: 41.3 % (ref 34.8–46.1)
HGB BLD-MCNC: 13.8 G/DL (ref 11.5–15.4)
IMM GRANULOCYTES # BLD AUTO: 0.05 THOUSAND/UL (ref 0–0.2)
IMM GRANULOCYTES NFR BLD AUTO: 1 % (ref 0–2)
LACTATE SERPL-SCNC: 1.6 MMOL/L (ref 0.5–2)
LACTATE SERPL-SCNC: 5.1 MMOL/L (ref 0.5–2)
LIPASE SERPL-CCNC: 11 U/L (ref 11–82)
LYMPHOCYTES # BLD AUTO: 1.44 THOUSANDS/ÂΜL (ref 0.6–4.47)
LYMPHOCYTES NFR BLD AUTO: 13 % (ref 14–44)
MCH RBC QN AUTO: 30.8 PG (ref 26.8–34.3)
MCHC RBC AUTO-ENTMCNC: 33.4 G/DL (ref 31.4–37.4)
MCV RBC AUTO: 92 FL (ref 82–98)
MONOCYTES # BLD AUTO: 0.57 THOUSAND/ÂΜL (ref 0.17–1.22)
MONOCYTES NFR BLD AUTO: 5 % (ref 4–12)
NEUTROPHILS # BLD AUTO: 8.69 THOUSANDS/ÂΜL (ref 1.85–7.62)
NEUTS SEG NFR BLD AUTO: 80 % (ref 43–75)
NRBC BLD AUTO-RTO: 0 /100 WBCS
PLATELET # BLD AUTO: 667 THOUSANDS/UL (ref 149–390)
PMV BLD AUTO: 8.6 FL (ref 8.9–12.7)
POTASSIUM SERPL-SCNC: 4.8 MMOL/L (ref 3.5–5.3)
PROT SERPL-MCNC: 6.8 G/DL (ref 6.4–8.4)
RBC # BLD AUTO: 4.48 MILLION/UL (ref 3.81–5.12)
SALICYLATES SERPL-MCNC: <5 MG/DL (ref 3–20)
SODIUM SERPL-SCNC: 141 MMOL/L (ref 135–147)
WBC # BLD AUTO: 10.8 THOUSAND/UL (ref 4.31–10.16)

## 2023-07-18 PROCEDURE — 83605 ASSAY OF LACTIC ACID: CPT

## 2023-07-18 PROCEDURE — 74176 CT ABD & PELVIS W/O CONTRAST: CPT

## 2023-07-18 PROCEDURE — 99284 EMERGENCY DEPT VISIT MOD MDM: CPT

## 2023-07-18 PROCEDURE — 84484 ASSAY OF TROPONIN QUANT: CPT

## 2023-07-18 PROCEDURE — 70450 CT HEAD/BRAIN W/O DYE: CPT

## 2023-07-18 PROCEDURE — 82077 ASSAY SPEC XCP UR&BREATH IA: CPT

## 2023-07-18 PROCEDURE — 83690 ASSAY OF LIPASE: CPT

## 2023-07-18 PROCEDURE — 96375 TX/PRO/DX INJ NEW DRUG ADDON: CPT

## 2023-07-18 PROCEDURE — 71250 CT THORAX DX C-: CPT

## 2023-07-18 PROCEDURE — G1004 CDSM NDSC: HCPCS

## 2023-07-18 PROCEDURE — 96374 THER/PROPH/DIAG INJ IV PUSH: CPT

## 2023-07-18 PROCEDURE — 99291 CRITICAL CARE FIRST HOUR: CPT | Performed by: EMERGENCY MEDICINE

## 2023-07-18 PROCEDURE — 36415 COLL VENOUS BLD VENIPUNCTURE: CPT

## 2023-07-18 PROCEDURE — 99223 1ST HOSP IP/OBS HIGH 75: CPT | Performed by: INTERNAL MEDICINE

## 2023-07-18 PROCEDURE — 85025 COMPLETE CBC W/AUTO DIFF WBC: CPT

## 2023-07-18 PROCEDURE — 80179 DRUG ASSAY SALICYLATE: CPT

## 2023-07-18 PROCEDURE — 82550 ASSAY OF CK (CPK): CPT

## 2023-07-18 PROCEDURE — 71045 X-RAY EXAM CHEST 1 VIEW: CPT

## 2023-07-18 PROCEDURE — 80143 DRUG ASSAY ACETAMINOPHEN: CPT

## 2023-07-18 PROCEDURE — 93005 ELECTROCARDIOGRAM TRACING: CPT

## 2023-07-18 PROCEDURE — 80053 COMPREHEN METABOLIC PANEL: CPT

## 2023-07-18 PROCEDURE — 96361 HYDRATE IV INFUSION ADD-ON: CPT

## 2023-07-18 RX ORDER — METHYLPREDNISOLONE SODIUM SUCCINATE 125 MG/2ML
60 INJECTION, POWDER, LYOPHILIZED, FOR SOLUTION INTRAMUSCULAR; INTRAVENOUS ONCE
Status: COMPLETED | OUTPATIENT
Start: 2023-07-18 | End: 2023-07-18

## 2023-07-18 RX ORDER — TRAMADOL HYDROCHLORIDE 50 MG/1
50 TABLET ORAL EVERY 12 HOURS PRN
Status: DISCONTINUED | OUTPATIENT
Start: 2023-07-18 | End: 2023-07-19

## 2023-07-18 RX ORDER — DROPERIDOL 2.5 MG/ML
0.31 INJECTION, SOLUTION INTRAMUSCULAR; INTRAVENOUS ONCE
Status: COMPLETED | OUTPATIENT
Start: 2023-07-18 | End: 2023-07-18

## 2023-07-18 RX ORDER — METHYLPREDNISOLONE SODIUM SUCCINATE 40 MG/ML
40 INJECTION, POWDER, LYOPHILIZED, FOR SOLUTION INTRAMUSCULAR; INTRAVENOUS EVERY 8 HOURS SCHEDULED
Status: DISCONTINUED | OUTPATIENT
Start: 2023-07-19 | End: 2023-07-22

## 2023-07-18 RX ORDER — FENTANYL CITRATE 50 UG/ML
25 INJECTION, SOLUTION INTRAMUSCULAR; INTRAVENOUS ONCE
Status: COMPLETED | OUTPATIENT
Start: 2023-07-18 | End: 2023-07-18

## 2023-07-18 RX ORDER — MONTELUKAST SODIUM 10 MG/1
10 TABLET ORAL DAILY
Status: DISCONTINUED | OUTPATIENT
Start: 2023-07-19 | End: 2023-07-24 | Stop reason: HOSPADM

## 2023-07-18 RX ORDER — SODIUM CHLORIDE 9 MG/ML
100 INJECTION, SOLUTION INTRAVENOUS CONTINUOUS
Status: DISCONTINUED | OUTPATIENT
Start: 2023-07-18 | End: 2023-07-22

## 2023-07-18 RX ORDER — ALBUTEROL SULFATE 90 UG/1
1 AEROSOL, METERED RESPIRATORY (INHALATION) EVERY 4 HOURS PRN
Status: DISCONTINUED | OUTPATIENT
Start: 2023-07-18 | End: 2023-07-24 | Stop reason: HOSPADM

## 2023-07-18 RX ORDER — LORATADINE 10 MG/1
10 TABLET ORAL DAILY
Status: DISCONTINUED | OUTPATIENT
Start: 2023-07-19 | End: 2023-07-24 | Stop reason: HOSPADM

## 2023-07-18 RX ORDER — DROPERIDOL 2.5 MG/ML
0.62 INJECTION, SOLUTION INTRAMUSCULAR; INTRAVENOUS ONCE
Status: DISCONTINUED | OUTPATIENT
Start: 2023-07-18 | End: 2023-07-18

## 2023-07-18 RX ORDER — DIPHENHYDRAMINE HYDROCHLORIDE 50 MG/ML
25 INJECTION INTRAMUSCULAR; INTRAVENOUS ONCE
Status: COMPLETED | OUTPATIENT
Start: 2023-07-18 | End: 2023-07-18

## 2023-07-18 RX ORDER — DIPHENHYDRAMINE HYDROCHLORIDE 50 MG/ML
25 INJECTION INTRAMUSCULAR; INTRAVENOUS EVERY 12 HOURS
Status: DISCONTINUED | OUTPATIENT
Start: 2023-07-19 | End: 2023-07-19

## 2023-07-18 RX ORDER — ONDANSETRON 2 MG/ML
4 INJECTION INTRAMUSCULAR; INTRAVENOUS ONCE
Status: COMPLETED | OUTPATIENT
Start: 2023-07-18 | End: 2023-07-18

## 2023-07-18 RX ORDER — FAMOTIDINE 20 MG/1
40 TABLET, FILM COATED ORAL DAILY
Status: DISCONTINUED | OUTPATIENT
Start: 2023-07-19 | End: 2023-07-22

## 2023-07-18 RX ORDER — BUTALBITAL, ACETAMINOPHEN AND CAFFEINE 50; 325; 40 MG/1; MG/1; MG/1
1 TABLET ORAL EVERY 8 HOURS PRN
Status: DISCONTINUED | OUTPATIENT
Start: 2023-07-18 | End: 2023-07-24 | Stop reason: HOSPADM

## 2023-07-18 RX ORDER — PANTOPRAZOLE SODIUM 40 MG/1
40 TABLET, DELAYED RELEASE ORAL
Status: DISCONTINUED | OUTPATIENT
Start: 2023-07-19 | End: 2023-07-24 | Stop reason: HOSPADM

## 2023-07-18 RX ORDER — LIOTHYRONINE SODIUM 5 UG/1
5 TABLET ORAL DAILY
Status: DISCONTINUED | OUTPATIENT
Start: 2023-07-19 | End: 2023-07-24 | Stop reason: HOSPADM

## 2023-07-18 RX ORDER — ONDANSETRON 2 MG/ML
4 INJECTION INTRAMUSCULAR; INTRAVENOUS EVERY 6 HOURS PRN
Status: DISCONTINUED | OUTPATIENT
Start: 2023-07-18 | End: 2023-07-24 | Stop reason: HOSPADM

## 2023-07-18 RX ADMIN — DIPHENHYDRAMINE HYDROCHLORIDE 25 MG: 50 INJECTION, SOLUTION INTRAMUSCULAR; INTRAVENOUS at 19:29

## 2023-07-18 RX ADMIN — DIPHENHYDRAMINE HYDROCHLORIDE 25 MG: 50 INJECTION, SOLUTION INTRAMUSCULAR; INTRAVENOUS at 22:57

## 2023-07-18 RX ADMIN — DROPERIDOL 0.3 MG: 2.5 INJECTION, SOLUTION INTRAMUSCULAR; INTRAVENOUS at 19:31

## 2023-07-18 RX ADMIN — SODIUM CHLORIDE 1000 ML: 0.9 INJECTION, SOLUTION INTRAVENOUS at 19:18

## 2023-07-18 RX ADMIN — ONDANSETRON HYDROCHLORIDE 4 MG: 2 INJECTION, SOLUTION INTRAVENOUS at 17:50

## 2023-07-18 RX ADMIN — SODIUM CHLORIDE 100 ML/HR: 0.9 INJECTION, SOLUTION INTRAVENOUS at 21:56

## 2023-07-18 RX ADMIN — METHYLPREDNISOLONE SODIUM SUCCINATE 60 MG: 125 INJECTION, POWDER, FOR SOLUTION INTRAMUSCULAR; INTRAVENOUS at 19:29

## 2023-07-18 RX ADMIN — DROPERIDOL 0.3 MG: 2.5 INJECTION, SOLUTION INTRAMUSCULAR; INTRAVENOUS at 21:15

## 2023-07-18 RX ADMIN — FENTANYL CITRATE 25 MCG: 50 INJECTION, SOLUTION INTRAMUSCULAR; INTRAVENOUS at 21:54

## 2023-07-18 NOTE — Clinical Note
Case was discussed with JAY and the patient's admission status was agreed to be Admission Status: inpatient status to the service of Dr. Gonzalez

## 2023-07-18 NOTE — ED PROVIDER NOTES
History  Chief Complaint   Patient presents with   • Medical Problem     Per EMS patient is having a Mast Cell Activation Storm. Reports this has happened in the past but not "this bad"     13-year-old female with history of mast cell activation syndrome, multiple sclerosis, psychogenic seizures, recently treated for UTI with Keflex which she completed today presenting to ED via EMS for "mast cell activation attack" as per  at bedside.  states about 20 minutes prior to arrival, him and patient were sitting on the back porch when patient's upper extremities locked up, patient became unresponsive, aspirated, foaming out of the mouth, cyanotic lips x 3-5 minutes.  states this is how her Mast activation syndrome presents however has never lasted this long prompting the ED visit. Over the past few days, patient is reporting abdominal pain and nausea. Today, patient reports bilateral frontal headache with sensitivity to light and sound, feels similar to her prior migraines.  reports when patient has these episodes, she is given Benadryl, Solu-Medrol and IV fluids. Patient denies chest pain, vision changes, shortness of breath, URI symptoms, fever, chills, urinary symptoms. Prior to Admission Medications   Prescriptions Last Dose Informant Patient Reported? Taking?    B-D 3CC LUER-SHAMEKA SYR 25GX1" 25G X 1" 3 ML MISC  Self Yes No   Cholecalciferol 10 MCG /0.025ML LIQD  Self No No   Sig: Take 1,000 Units by mouth 2 (two) times a day   EpiPen 2-Malachi 0.3 MG/0.3ML SOAJ   Yes No   Sig: INJECT 1 PEN INTO THE MUSCLE AS NEEDED FOR ANAPHALAXIS   LORazepam (ATIVAN) 1 mg tablet   Yes No   Lactobacillus (PROBIOTIC ACIDOPHILUS PO)  Self Yes No   Sig: Take by mouth   Lactobacillus-Inulin ([facility]) CAPS  Self Yes No   Sig: Take 200 mg by mouth daily   Levalbuterol HCl (XOPENEX IN)   Yes No   NON FORMULARY  Self Yes No   Sig: immunoglobin sq 6g 30 ml once a week   Potassium Chloride 10 % SOLN  Self Yes No   Sig: Take 99 mg by mouth 2 (two) times a day   Riboflavin (Vitamin B-2) 100 MG TABS   No No   Sig: Take 4 tablets (400 mg total) by mouth daily   Riboflavin (Vitamin B-2) 100 MG TABS  Self Yes No   Sig: Take 100 mg by mouth 4 (four) times a day   TechLite Lancets MISC  Self Yes No   butalbital-acetaminophen-caffeine (FIORICET,ESGIC) -40 mg per tablet   No No   Sig: Take 1 tablet by mouth every 8 (eight) hours as needed for migraine   cetirizine (ZyrTEC) 10 mg tablet  Self Yes No   Sig: Take 20 mg by mouth daily   cyanocobalamin 1,000 mcg/mL  Self Yes No   diphenhydrAMINE (BENADRYL) 50 mg/mL   No No   Sig: Inject 1 mL (50 mg total) into a muscle every 6 (six) hours as needed for itching or allergies   famotidine (PEPCID) 40 MG tablet   No No   Sig: TAKE ONE TABLET BY MOUTH ONCE DAILY   hydrOXYzine HCL (ATARAX) 25 mg tablet   Yes No   levalbuterol (XOPENEX HFA) 45 mcg/act inhaler   No No   Sig: Inhale 1-2 puffs every 4 (four) hours as needed for shortness of breath   levothyroxine 100 mcg tablet  Self Yes No   Sig: Take 100 mcg by mouth every other day   liothyronine (CYTOMEL) 5 mcg tablet   No No   Sig: Take 1 tablet (5 mcg total) by mouth daily Do not start before June 14, 2023. montelukast (SINGULAIR) 10 mg tablet   Yes No   naloxone (NARCAN) 4 mg/0.1 mL nasal spray   No No   Sig: Administer 1 spray into a nostril. If no response after 2-3 minutes, give another dose in the other nostril using a new spray. nystatin (MYCOSTATIN) 500,000 units/5 mL suspension  Self No No   Sig: Swish and spit 5 mL (500,000 Units total) 4 (four) times a day   omalizumab (XOLAIR) 150 mg   No No   Sig: Inject 2.4 mL (300 mg total) under the skin every 28 days   pantoprazole (PROTONIX) 40 mg tablet  Self No No   Sig: TAKE ONE TABLET BY MOUTH TWICE A DAY   predniSONE 20 mg tablet   No No   Sig: Take 1 tablet (20 mg total) by mouth 2 (two) times a day with meals Take as instructed by physician.  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 pt denies, documented in cardio note   • Pancreatitis     sphinger of    • Psychiatric disorder    • RA (rheumatoid arthritis) ([address])    • Seizures ([address])    • Ulcerative colitis ([address])    • Visual impairment        Past Surgical History:   Procedure Laterality Date   • ABDOMINAL SURGERY  2017   • APPENDECTOMY     • CHOLECYSTECTOMY     • COLONOSCOPY  2019   • CYSTOSCOPY N/A 6/8/2023    Procedure: CYSTOSCOPY, mini TURBT with fulgeration;  Surgeon: Nichole Redd MD;  Location: [facility];  Service: Urology   • EGD AND COLONOSCOPY N/A 09/12/2017    Procedure: EGD AND COLONOSCOPY;  Surgeon: Keke Gallego MD;  [facility]; Service: Gastroenterology   • ERCP N/A 09/15/2017    Procedure: ENDOSCOPIC RETROGRADE CHOLANGIOPANCREATOGRAPHY (ERCP); Surgeon: Rico Cruz MD;  [facility];   Service: Gastroenterology   • HYSTERECTOMY     • KNEE SURGERY Right    • LAPAROSCOPIC ENDOMETRIOSIS FULGURATION     • ORIF ANKLE FRACTURE Left    • KS ARTHRS KNE SURG W/MENISCECTOMY MED/LAT W/SHVG Left 05/12/2017    Procedure: POSSIBLE MEDIAL MENISECTOMY;  Surgeon: Sinan Mcfarland MD;  Location: [facility];  Service: Orthopedics   • TX ARTHRS KNEE DEBRIDEMENT/SHAVING ARTCLR CRTLG Left 05/12/2017    Procedure: KNEE ARTHROSCOPY EVALUATION, CHONDROPLASTY;  Surgeon: Sinan Mcfarland MD;  Location: [facility];  Service: Orthopedics   • TX LAPS ABD PRTM&OMENTUM DX W/WO [facility] BR/WA [address] [facility] N/A 12/12/2017    Procedure: LAPAROSCOPY DIAGNOSTIC  WITH LYSIS OF ADHESIONS;  Surgeon: Eduar Jonas DO;  Location:  MAIN OR;  Service: General   • UPPER GASTROINTESTINAL ENDOSCOPY  2019       Family History   Problem Relation Age of Onset   • Ulcerative colitis Mother    • Arthritis Mother    • Colon polyps Mother    • Heart failure Father    • Neuropathy Father    • Macular degeneration Father    • Diabetes Father    • Early death Father    • Vision loss Father    • Asthma Daughter    • Hypothyroidism Daughter    • Heart disease Maternal Grandmother    • Arthritis Maternal Grandmother    • No Known Problems Maternal Grandfather    • Arthritis Paternal Grandmother    • Macular degeneration Paternal Grandmother    • Vision loss Paternal Grandmother    • Lymphoma Paternal Grandfather    • Cancer Paternal Grandfather    • Early death Paternal Grandfather    • Breast cancer Maternal Aunt    • Cancer Maternal Aunt    • Miscarriages / Stillbirths Maternal Aunt    • Heart failure Paternal Aunt    • Heart failure Paternal Aunt    • Irritable bowel syndrome Paternal Aunt    • Breast cancer Paternal Aunt    • Breast cancer additional onset Paternal Aunt    • Hypothyroidism Paternal Aunt    • Cancer Paternal Aunt    • No Known Problems Son    • No Known Problems Son    • Kidney disease Brother    • Depression Paternal Uncle    • Early death Paternal Uncle      I have reviewed and agree with the history as documented.     E-Cigarette/Vaping   • E-Cigarette Use Never User      E-Cigarette/Vaping Substances   • Nicotine No    • THC No    • CBD No    • Flavoring No    • Other No • Unknown No      Social History     Tobacco Use   • Smoking status: Never   • Smokeless tobacco: Never   Vaping Use   • Vaping Use: Never used   Substance Use Topics   • Alcohol use: Never   • Drug use: Never        Review of Systems   Constitutional: Negative for chills and fever. HENT: Negative for ear pain and sore throat. Eyes: Negative for pain and visual disturbance. Respiratory: Negative for cough and shortness of breath. Cardiovascular: Negative for chest pain and palpitations. Gastrointestinal: Positive for abdominal pain and nausea. Negative for vomiting. Genitourinary: Negative for dysuria and hematuria. Musculoskeletal: Negative for arthralgias and back pain. Skin: Negative for color change and rash. Neurological: Positive for headaches. Negative for seizures and syncope. All other systems reviewed and are negative. Physical Exam  ED Triage Vitals [07/18/23 1725]   Temperature Pulse Respirations Blood Pressure SpO2   97.6 °F (36.4 °C) 97 16 123/86 97 %      Temp Source Heart Rate Source Patient Position - Orthostatic VS BP Location FiO2 (%)   Temporal Monitor Lying Left arm --      Pain Score       5             Orthostatic Vital Signs  Vitals:    07/18/23 1725 07/18/23 1900 07/18/23 2006 07/18/23 2130   BP: 123/86 142/97 130/81 120/82   Pulse: 97 (!) 106 92 90   Patient Position - Orthostatic VS: Lying Lying  Lying       Physical Exam  Vitals and nursing note reviewed. Constitutional:       General: She is not in acute distress. Appearance: She is well-developed. She is ill-appearing. HENT:      Head: Normocephalic and atraumatic. Comments: Right lateral tongue ecchymosis, no new lacerations, no bleeding     Nose: Nose normal.      Mouth/Throat:      Mouth: Mucous membranes are moist.      Pharynx: Oropharynx is clear. Eyes:      General: Lids are normal. Lids are everted, no foreign bodies appreciated. Vision grossly intact. Gaze aligned appropriately.  No visual field deficit. Extraocular Movements: Extraocular movements intact. Conjunctiva/sclera: Conjunctivae normal.      Pupils: Pupils are equal, round, and reactive to light. Visual Fields: Right eye visual fields normal and left eye visual fields normal.      Comments: Pupils 5 mm equal round reactive to light, no nystagmus   Neck:      Vascular: No JVD. Trachea: Trachea and phonation normal.   Cardiovascular:      Rate and Rhythm: Normal rate and regular rhythm. Pulses: Normal pulses. Radial pulses are 2+ on the right side and 2+ on the left side. Dorsalis pedis pulses are 2+ on the right side and 2+ on the left side. Posterior tibial pulses are 2+ on the right side and 2+ on the left side. Heart sounds: Normal heart sounds, S1 normal and S2 normal. No murmur heard. Pulmonary:      Effort: Pulmonary effort is normal. No respiratory distress. Breath sounds: Normal breath sounds and air entry. Abdominal:      Palpations: Abdomen is soft. Tenderness: There is generalized abdominal tenderness. Comments: Generalized abdominal tenderness, worse in suprapubic region. Nonperitoneal.   Musculoskeletal:         General: No swelling. Cervical back: Full passive range of motion without pain, normal range of motion and neck supple. Right lower leg: No edema. Left lower leg: No edema. Skin:     General: Skin is warm and dry. Capillary Refill: Capillary refill takes less than 2 seconds. Comments: No rashes noted   Neurological:      Mental Status: She is lethargic. GCS: GCS eye subscore is 4. GCS verbal subscore is 4. GCS motor subscore is 6. Cranial Nerves: Cranial nerves 2-12 are intact. No dysarthria or facial asymmetry. Sensory: Sensation is intact. Motor: Motor function is intact. Coordination: Coordination is intact.  Finger-Nose-Finger Test and Heel to [facility] Test normal.      Comments: Initial GCS on arrival was 3; when told she was going to be intubated, she started to follow commands, opened her eyes, confused verbal response. GCS 14. Disoriented to place. When patient's arm is raised above her head with her eyes closed, she avoids hitting her face. Psychiatric:         Mood and Affect: Mood normal.         ED Medications  Medications   sodium chloride 0.9 % infusion (100 mL/hr Intravenous New Bag 7/18/23 2156)   ondansetron (ZOFRAN) injection 4 mg (has no administration in time range)   methylPREDNISolone sodium succinate (Solu-MEDROL) injection 40 mg (has no administration in time range)   diphenhydrAMINE (BENADRYL) injection 25 mg (has no administration in time range)   ondansetron (ZOFRAN) injection 4 mg (4 mg Intravenous Given 7/18/23 1750)   sodium chloride 0.9 % bolus 1,000 mL (0 mL Intravenous Stopped 7/18/23 2148)   methylPREDNISolone sodium succinate (Solu-MEDROL) injection 60 mg (60 mg Intravenous Given 7/18/23 1929)   diphenhydrAMINE (BENADRYL) injection 25 mg (25 mg Intravenous Given 7/18/23 1929)   droperidol (INAPSINE) injection 0.3 mg (0.3 mg Intravenous Given 7/18/23 1931)   droperidol (INAPSINE) injection 0.3 mg (0.3 mg Intravenous Given 7/18/23 2115)   fentanyl citrate (PF) 100 MCG/2ML 25 mcg (25 mcg Intravenous Given 7/18/23 2154)       Diagnostic Studies  Results Reviewed     Procedure Component Value Units Date/Time    Lactic acid 2 Hours [[id number]]  (Normal) Collected: 07/18/23 2044    Lab Status: Final result Specimen: Blood from Arm, Right Updated: 07/18/23 2125     LACTIC ACID 1.6 mmol/L     Narrative:      Result may be elevated if tourniquet was used during collection.     HS Troponin I 2hr [[id number]]  (Normal) Collected: 07/18/23 2044    Lab Status: Final result Specimen: Blood from Arm, Right Updated: 07/18/23 2114     hs TnI 2hr 3 ng/L      Delta 2hr hsTnI 1 ng/L     HS Troponin I 4hr [[id number]]     Lab Status: No result Specimen: Blood     Lactic acid, plasma (w/reflex if result > 2.0) [[id number]]  (Abnormal) Collected: 07/18/23 1740    Lab Status: Final result Specimen: Blood from Arm, Right Updated: 07/18/23 1830     LACTIC ACID 5.1 mmol/L     Narrative:      Result may be elevated if tourniquet was used during collection.     Lipase [[id number]]  (Normal) Collected: 07/18/23 1740    Lab Status: Final result Specimen: Blood from Arm, Right Updated: 07/18/23 1818     Lipase 11 u/L     Comprehensive metabolic panel [[id number]]  (Abnormal) Collected: 07/18/23 1740    Lab Status: Final result Specimen: Blood from Arm, Right Updated: 07/18/23 1818     Sodium 141 mmol/L      Potassium 4.8 mmol/L      Chloride 102 mmol/L      CO2 25 mmol/L      ANION GAP 14 mmol/L      BUN 6 mg/dL      Creatinine 0.66 mg/dL      Glucose 155 mg/dL      Calcium 9.5 mg/dL      AST 29 U/L      ALT 31 U/L      Alkaline Phosphatase 57 U/L      Total Protein 6.8 g/dL      Albumin 4.2 g/dL      Total Bilirubin 0.43 mg/dL      eGFR 103 ml/min/1.73sq m     Narrative:      [facility] ([facility]D):   •  Stage 1 with normal or high GFR (GFR > 90 mL/min/1.73 square meters)  •  Stage 2 Mild CKD (GFR = 60-89 mL/min/1.73 square meters)  •  Stage 3A Moderate CKD (GFR = 45-59 mL/min/1.73 square meters)  •  Stage 3B Moderate CKD (GFR = 30-44 mL/min/1.73 square meters)  •  Stage 4 Severe CKD (GFR = 15-29 mL/min/1.73 square meters)  •  Stage 5 End Stage CKD (GFR <15 mL/min/1.73 square meters)  Note: GFR calculation is accurate only with a steady state creatinine    CK [[id number]]  (Normal) Collected: 07/18/23 1740    Lab Status: Final result Specimen: Blood from Arm, Right Updated: 07/18/23 1818     Total CK 56 U/L     Salicylate level [[id number]]  (Normal) Collected: 07/18/23 1740    Lab Status: Final result Specimen: Blood from Arm, Right Updated: 16/41/22 1687     Salicylate Lvl <5 mg/dL     Acetaminophen level-If concentration is detectable, please discuss with medical  on call. [[id number]]  (Abnormal) Collected: 07/18/23 1740    Lab Status: Final result Specimen: Blood from Arm, Right Updated: 07/18/23 1818     Acetaminophen Level <10 ug/mL     HS Troponin 0hr (reflex protocol) [[id number]]  (Normal) Collected: 07/18/23 1740    Lab Status: Final result Specimen: Blood from Arm, Right Updated: 07/18/23 1814     hs TnI 0hr 2 ng/L     Ethanol [[id number]]  (Normal) Collected: 07/18/23 1740    Lab Status: Final result Specimen: Blood from Arm, Right Updated: 07/18/23 1807     Ethanol Lvl <10 mg/dL     CBC and differential [[id number]]  (Abnormal) Collected: 07/18/23 1740    Lab Status: Final result Specimen: Blood from Arm, Right Updated: 07/18/23 1752     WBC 10.80 Thousand/uL      RBC 4.48 Million/uL      Hemoglobin 13.8 g/dL      Hematocrit 41.3 %      MCV 92 fL      MCH 30.8 pg      MCHC 33.4 g/dL      RDW 15.3 %      MPV 8.6 fL      Platelets 137 Thousands/uL      nRBC 0 /100 WBCs      Neutrophils Relative 80 %      Immat GRANS % 1 %      Lymphocytes Relative 13 %      Monocytes Relative 5 %      Eosinophils Relative 0 %      Basophils Relative 1 %      Neutrophils Absolute 8.69 Thousands/µL      Immature Grans Absolute 0.05 Thousand/uL      Lymphocytes Absolute 1.44 Thousands/µL      Monocytes Absolute 0.57 Thousand/µL      Eosinophils Absolute 0.00 Thousand/µL      Basophils Absolute 0.05 Thousands/µL     UA w Reflex to Microscopic w Reflex to Culture [[id number]]     Lab Status: No result Specimen: Urine     Rapid drug screen, urine [[id number]]     Lab Status: No result Specimen: Urine                  CT chest abdomen pelvis wo contrast   Final Result by Leeann Casper DO (07/18 2131)   Addendum (preliminary) 1 of 1 by Leeann Casper DO (07/18 2131)   ADDENDUM:   As stated in the body of the report there is a pleural-based opacity in    the right lower lobe near the minor fissure measuring 1 cm which was not    present previously and may be atelectatic. Follow-up CT recommended in 3    months. Final   Limited exam performed without oral or IV contrast.      No tracheal or endobronchial lesion. No CT evidence of aspiration. Atelectatic changes throughout the left upper lobe/lingula and basilar portions of left lower lobe due to dependent left side down positioning. Trace pneumobilia and slight extrahepatic biliary ductal dilatation, considered normal status post cholecystectomy and stable from previous. Nonobstructing renal collecting system calculi. No hydronephrosis or renal colic. Workstation performed: [id number]         CT head without contrast   Final Result by Landy Ruvalcaba DO (07/18 1955)      No acute intracranial abnormality. Workstation performed: [id number]         XR chest 1 view portable   ED Interpretation by Nicole Stone MD (07/18 1753)   Poor inspiratory effort, no obvious infiltrate            Procedures  US Guided Peripheral IV    Date/Time: 7/18/2023 7:47 PM    Performed by: Nicole Stone MD  Authorized by: Nicole Stone MD    Patient location:  ED  Performed by:  Resident  Other Assisting Provider: No    Indications:     Indications: difficulty obtaining IV access      Image availability:  Not obtained due to urgency  Procedure details:     Patient evaluated for contraindications to access (i.e. fistula, thrombosis, etc): Yes      Standard clean technique used for ultrasound access: Yes      Location:  Right arm    Catheter size:  20 gauge    Number of attempts:  1    Successful placement: yes    Post-procedure details:     Post-procedure:  Dressing applied    Assessment: free fluid flow and no signs of infiltration      Post-procedure complications: none      Patient tolerance of procedure:   Tolerated well, no immediate complications  ECG 12 Lead Documentation Only    Date/Time: 7/18/2023 8:00 PM    Performed by: Nicole Stone MD  Authorized by: Nicole Stone MD    Patient location: ED  Previous ECG:     Previous ECG:  Compared to current    Comparison ECG info:  12/3/21    Similarity:  Changes noted (Nonspecific T wave abnormality no longer evident in lateral leads)  Interpretation:     Interpretation: normal    Rate:     ECG rate:  90    ECG rate assessment: normal    Rhythm:     Rhythm: sinus rhythm    Ectopy:     Ectopy: none    QRS:     QRS axis:  Normal    QRS intervals:  Normal  Conduction:     Conduction: normal    ST segments:     ST segments:  Normal  T waves:     T waves: normal    CriticalCare Time    Date/Time: 7/18/2023 10:00 PM    Performed by: Irma Patterson MD  Authorized by: Irma Patterson MD    Critical care provider statement:     Critical care time (minutes):  45    Critical care start time:  7/18/2023 7:00 PM    Critical care end time:  7/18/2023 7:45 PM    Critical care time was exclusive of:  Separately billable procedures and treating other patients    Critical care was necessary to treat or prevent imminent or life-threatening deterioration of the following conditions:  CNS failure or compromise    Critical care was time spent personally by me on the following activities:  Blood draw for specimens, obtaining history from patient or surrogate, development of treatment plan with patient or surrogate, evaluation of patient's response to treatment, examination of patient, review of old charts, re-evaluation of patient's condition, ordering and review of radiographic studies, ordering and review of laboratory studies and ordering and performing treatments and interventions          ED Course  ED Course as of 07/18/23 2212 Tue Jul 18, 2023   1814 hs TnI 0hr: 2   1820 Total CK: 56   1831 LACTIC ACID(!!): 5.1  Normal saline ordered. Patient is allergic to isolyte and LR.   1839 Anion Gap: 55   8768 When coming back from CT, patient started having tremulous movements of bilateral upper extremities, was talking throughout this episode with eyes open. No urinary symptoms. When lifting up patient's arm above her head and dropping it, she does not hit her face. When trying to extend her arms, patient immediately tenses up and flexes them, resisting extension   2004 CTH: IMPRESSION: cyst vs polyp in L maxillary sinus. No acute intracranial abnormality. 2047 CT ab/pelvis:  IMPRESSION:  Limited exam performed without oral or IV contrast.     No tracheal or endobronchial lesion. No CT evidence of aspiration.     Atelectatic changes throughout the left upper lobe/lingula and basilar portions of left lower lobe due to dependent left side down positioning.     Trace pneumobilia and slight extrahepatic biliary ductal dilatation, considered normal status post cholecystectomy and stable from previous.     Nonobstructing renal collecting system calculi. No hydronephrosis or renal colic. 2103 SLIM texted for admission    2114 Patient reports attempted bedside swallow, patient states she "does not feel comfortable"trying a bedside swallow study while sitting up. She was then asking to do it while laying on her side. Will not give oral meds at this time   2135 LACTIC ACID: 1.6  S/p 1 L NS   2135 hs TnI 2hr: 3   2150 Called to bedside as patient is having a headache. Patient failed her bedside swallow study, unable to give oral meds at this time. Patient states tramadol and Fioricet usually help her migraines. However given her inability to tolerate p.o., will go ahead with IV fentanyl for pain control. JAY/Dr. Coni Reyes aware and agrees to fentanyl                             SBIRT 20yo+    Flowsheet Row Most Recent Value   Initial Alcohol Screen: US AUDIT-C     1. How often do you have a drink containing alcohol? 0 Filed at: 07/18/2023 1726   2. How many drinks containing alcohol do you have on a typical day you are drinking? 0 Filed at: 07/18/2023 1726   3a. Male UNDER 65: How often do you have five or more drinks on one occasion?  0 Filed at: 07/18/2023 1726   3b. FEMALE Any Age, or MALE 65+: How often do you have 4 or more drinks on one occassion? 0 Filed at: 07/18/2023 1726   Audit-C Score 0 Filed at: 07/18/2023 1726   LAUREL: How many times in the past year have you. .. Used an illegal drug or used a prescription medication for non-medical reasons? Never Filed at: 07/18/2023 1726                Medical Decision Making  56-year-old female with history of mast cell activation syndrome, multiple sclerosis, psychogenic seizures, recently treated for UTI with Keflex which she completed today presenting to ED via EMS for "mast cell activation attack" as per  at bedside.  states about 20 minutes prior to arrival, him and patient were sitting on the back porch when patient's upper extremities locked up, patient became unresponsive, aspirated, foaming out of the mouth, cyanotic lips x 3-5 minutes    Differentials including but limited to: Seizure, psychogenic seizure, infection, ACS, arrhythmia, electrolyte abnormality, anemia, migraine, doubt stroke or TIA as patient has no focal neurodeficits, NIHSS = 0. No signs or symptoms of anaphylaxis, no rashes. No seizure activity while in the ED. Patient would at times tense up her upper extremities with associated tremors however her eyes are open, she is talking throughout these episodes, no loss of bowel or bladder, movements appears purposeful, when her arm is lifted above her head, she drops it to the side of the bed rather than on her face, when I attempt to extend patient's arms, she resists extension and keeps flexing with 5/5 strength. Initial lactic of 5.1, 1 L normal saline given, 2-hour lactic = 1.6. Initial lactic may have been an error as it was taken from the wrist on an extremely flexed wrist.  Other labs reassuring. UA and UDS unable to be collected as patient did not urinate while in the ED. Patient was given 1 L normal saline, droperidol, Benadryl, Solu-Medrol, zofran.   For her headache, was given fentanyl 25 mcg.      Case discussed with internal medicine, patient will be admitted for observation for AMS given she is not currently back at her baseline mental status. Amount and/or Complexity of Data Reviewed  Independent Historian: parent and EMS     Details: EMS, pt and  at bedside  External Data Reviewed: labs, ECG and notes. Labs: ordered. Decision-making details documented in ED Course. Radiology: ordered and independent interpretation performed. Decision-making details documented in ED Course. ECG/medicine tests: ordered and independent interpretation performed. Decision-making details documented in ED Course. Risk  Prescription drug management. Decision regarding hospitalization.             Disposition  Final diagnoses:   AMS (altered mental status)   Abdominal pain   Nausea   Migraine   Atelectasis - RLL, LLL, needs repeat CT chest in 3 months   Kidney stones   Umbilical hernia     Time reflects when diagnosis was documented in both MDM as applicable and the Disposition within this note     Time User Action Codes Description Comment    7/18/2023  8:22 PM Maegan Buttery Add [R41.82] AMS (altered mental status)     7/18/2023  8:22 PM Maegan Buttery Add [R10.9] Abdominal pain     7/18/2023  8:22 PM Maegan Buttery Add [R11.0] Nausea     7/18/2023  8:22 PM Maegan Buttery Add [A41.395] Migraine     7/18/2023 10:08 PM Maegan Buttery Add [J98.11] Atelectasis     7/18/2023 10:08 PM Maegan Buttery Modify [J98.11] Atelectasis RLL, LLL, needs repeat CT chest in 3 months    7/18/2023 10:09 PM Rendano, Rendell Bowels Add [N20.0] Kidney stones     7/18/2023 10:10 PM Maegan Buttery Add [S07.6] Umbilical hernia       ED Disposition     ED Disposition   Admit    Condition   Stable    Date/Time   Tue Jul 18, 2023  9:10 PM    Comment   Case was discussed with JAY and the patient's admission status was agreed to be Admission Status: observation status to the service of Dr. Rosa Mathis Patient's Medications   Discharge Prescriptions    No medications on file     No discharge procedures on file. PDMP Review       Value Time User    PDMP Reviewed  Yes 6/23/2023 11:24 AM Cinda Zamorano DO           ED Provider  Attending physically available and evaluated Peterson Chin. I managed the patient along with the ED Attending.     Electronically Signed by         Grey Ellis MD  07/18/23 2955

## 2023-07-19 ENCOUNTER — HOSPITAL ENCOUNTER (OUTPATIENT)
Dept: INFUSION CENTER | Facility: HOSPITAL | Age: 48
Discharge: HOME | End: 2023-07-19
Attending: INTERNAL MEDICINE

## 2023-07-19 ENCOUNTER — APPOINTMENT (OUTPATIENT)
Dept: MRI IMAGING | Facility: HOSPITAL | Age: 48
DRG: 815 | End: 2023-07-19
Payer: COMMERCIAL

## 2023-07-19 PROBLEM — G93.40 ACUTE ENCEPHALOPATHY: Status: ACTIVE | Noted: 2023-07-19

## 2023-07-19 LAB
AMPHETAMINES SERPL QL SCN: NEGATIVE
BARBITURATES UR QL: POSITIVE
BENZODIAZ UR QL: NEGATIVE
BILIRUB UR QL STRIP: NEGATIVE
CLARITY UR: CLEAR
COCAINE UR QL: NEGATIVE
COLOR UR: YELLOW
GLUCOSE UR STRIP-MCNC: NEGATIVE MG/DL
HGB UR QL STRIP.AUTO: NEGATIVE
KETONES UR STRIP-MCNC: ABNORMAL MG/DL
LEUKOCYTE ESTERASE UR QL STRIP: NEGATIVE
METHADONE UR QL: NEGATIVE
NITRITE UR QL STRIP: NEGATIVE
OPIATES UR QL SCN: NEGATIVE
OXYCODONE+OXYMORPHONE UR QL SCN: NEGATIVE
PCP UR QL: NEGATIVE
PH UR STRIP.AUTO: 7.5 [PH]
PROT UR STRIP-MCNC: NEGATIVE MG/DL
SP GR UR STRIP.AUTO: 1.01 (ref 1–1.03)
THC UR QL: NEGATIVE
UROBILINOGEN UR QL STRIP.AUTO: 0.2 E.U./DL

## 2023-07-19 PROCEDURE — 70551 MRI BRAIN STEM W/O DYE: CPT

## 2023-07-19 PROCEDURE — 81003 URINALYSIS AUTO W/O SCOPE: CPT

## 2023-07-19 PROCEDURE — 99232 SBSQ HOSP IP/OBS MODERATE 35: CPT | Performed by: FAMILY MEDICINE

## 2023-07-19 PROCEDURE — 80307 DRUG TEST PRSMV CHEM ANLYZR: CPT

## 2023-07-19 PROCEDURE — 99284 EMERGENCY DEPT VISIT MOD MDM: CPT | Performed by: PSYCHIATRY & NEUROLOGY

## 2023-07-19 RX ORDER — LORAZEPAM 2 MG/ML
1 INJECTION INTRAMUSCULAR ONCE
Status: COMPLETED | OUTPATIENT
Start: 2023-07-19 | End: 2023-07-19

## 2023-07-19 RX ORDER — DIPHENHYDRAMINE HYDROCHLORIDE 50 MG/ML
50 INJECTION INTRAMUSCULAR; INTRAVENOUS EVERY 12 HOURS
Status: DISCONTINUED | OUTPATIENT
Start: 2023-07-19 | End: 2023-07-22

## 2023-07-19 RX ORDER — HYDROMORPHONE HCL/PF 1 MG/ML
0.5 SYRINGE (ML) INJECTION EVERY 4 HOURS PRN
Status: DISCONTINUED | OUTPATIENT
Start: 2023-07-19 | End: 2023-07-22

## 2023-07-19 RX ORDER — CEFTRIAXONE 1 G/50ML
1000 INJECTION, SOLUTION INTRAVENOUS EVERY 24 HOURS
Status: DISCONTINUED | OUTPATIENT
Start: 2023-07-19 | End: 2023-07-23

## 2023-07-19 RX ORDER — TRAMADOL HYDROCHLORIDE 50 MG/1
50 TABLET ORAL EVERY 12 HOURS PRN
Status: DISCONTINUED | OUTPATIENT
Start: 2023-07-19 | End: 2023-07-20

## 2023-07-19 RX ORDER — EPINEPHRINE 1 MG/ML
0.5 INJECTION, SOLUTION, CONCENTRATE INTRAVENOUS ONCE
Status: COMPLETED | OUTPATIENT
Start: 2023-07-19 | End: 2023-07-19

## 2023-07-19 RX ORDER — METHYLPREDNISOLONE SODIUM SUCCINATE 125 MG/2ML
60 INJECTION, POWDER, LYOPHILIZED, FOR SOLUTION INTRAMUSCULAR; INTRAVENOUS ONCE
Status: COMPLETED | OUTPATIENT
Start: 2023-07-19 | End: 2023-07-19

## 2023-07-19 RX ORDER — LORAZEPAM 2 MG/ML
1 INJECTION INTRAMUSCULAR ONCE AS NEEDED
Status: DISCONTINUED | OUTPATIENT
Start: 2023-07-19 | End: 2023-07-19

## 2023-07-19 RX ADMIN — EPINEPHRINE 0.5 MG: 1 INJECTION, SOLUTION, CONCENTRATE INTRAVENOUS at 03:37

## 2023-07-19 RX ADMIN — METHYLPREDNISOLONE SODIUM SUCCINATE 40 MG: 40 INJECTION, POWDER, FOR SOLUTION INTRAMUSCULAR; INTRAVENOUS at 04:40

## 2023-07-19 RX ADMIN — DIPHENHYDRAMINE HYDROCHLORIDE 50 MG: 50 INJECTION, SOLUTION INTRAMUSCULAR; INTRAVENOUS at 14:18

## 2023-07-19 RX ADMIN — LORAZEPAM 1 MG: 2 INJECTION INTRAMUSCULAR; INTRAVENOUS at 05:43

## 2023-07-19 RX ADMIN — PANTOPRAZOLE SODIUM 40 MG: 40 TABLET, DELAYED RELEASE ORAL at 17:08

## 2023-07-19 RX ADMIN — DIPHENHYDRAMINE HYDROCHLORIDE 50 MG: 50 INJECTION, SOLUTION INTRAMUSCULAR; INTRAVENOUS at 04:40

## 2023-07-19 RX ADMIN — HYDROMORPHONE HYDROCHLORIDE 0.5 MG: 1 INJECTION, SOLUTION INTRAMUSCULAR; INTRAVENOUS; SUBCUTANEOUS at 05:21

## 2023-07-19 RX ADMIN — ONDANSETRON 4 MG: 2 INJECTION INTRAMUSCULAR; INTRAVENOUS at 04:45

## 2023-07-19 RX ADMIN — BUTALBITAL, ACETAMINOPHEN, AND CAFFEINE 1 TABLET: 50; 325; 40 TABLET, COATED ORAL at 23:03

## 2023-07-19 RX ADMIN — LORAZEPAM 1 MG: 2 INJECTION INTRAMUSCULAR; INTRAVENOUS at 03:09

## 2023-07-19 RX ADMIN — METHYLPREDNISOLONE SODIUM SUCCINATE 60 MG: 125 INJECTION, POWDER, FOR SOLUTION INTRAMUSCULAR; INTRAVENOUS at 14:16

## 2023-07-19 RX ADMIN — CEFTRIAXONE 1000 MG: 1 INJECTION, SOLUTION INTRAVENOUS at 09:19

## 2023-07-19 RX ADMIN — METHYLPREDNISOLONE SODIUM SUCCINATE 40 MG: 40 INJECTION, POWDER, FOR SOLUTION INTRAMUSCULAR; INTRAVENOUS at 23:04

## 2023-07-19 RX ADMIN — BUTALBITAL, ACETAMINOPHEN, AND CAFFEINE 1 TABLET: 50; 325; 40 TABLET, COATED ORAL at 10:55

## 2023-07-19 NOTE — H&P
[address]  H&P  Name: Radha Canales 46 y.o. 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[address])  Assessment & Plan  · History of mast cell activation syndrome involved with mast cell activation specialist at [facility] patient reports had a "mast cell activation attack"  states patient became unresponsive extremities locked up for approximately 3 to 5 minutes reports this is how her syndrome typically presents she reports he typically gets Benadryl steroids and IV fluids  · Currently at baseline  · Mental status at baseline   ·  will provide IV steroids and Benadryl and IV fluids for now    Crohn's colitis [facility])  Assessment & Plan  History of Crohn's  Continue outpatient follow-up    Acute encephalopathy  Assessment & Plan  Has a history of psychogenic seizures  Appears to have had a tonic-clonic true seizure at home per  given elevated lactic acid suspect did have true seizure  Currently lethargic likely postictal  No further seizures since arrival  No previous history of seizures  We will obtain MRI brain  Neurology consult    Bladder tumor  Assessment & Plan  Follows outpatient with urology    Hypothyroidism (acquired)  Assessment & Plan  Continue Synthroid         VTE Prophylaxis: Heparin  / sequential compression device   Code Status: full code  POLST: There is no POLST form on file for this patient (pre-hospital)  Discussion with family: pt    Anticipated Length of Stay:  Patient will be admitted on an Observation basis with an anticipated length of stay of  < 2 midnights. Justification for Hospital Stay: Mast cell activation syndrome    Total Time for Visit, including Counseling / Coordination of Care: 60 minutes. Greater than 50% of this total time spent on direct patient counseling and coordination of care.     Chief Complaint: Shaking    History of Present Illness:    Francisco Ventura is a 46 y.o. female with past medical history significant hypothyroidism, psychogenic seizures, Crohn's, mast cell activation syndrome who initially presented after an episode of unresponsiveness. Per  patient 20 minutes prior to arrival was sitting on the porch when her upper extremities locked up and she became unresponsive and he noted her lips started turning blue. Episode lasted for 3 to 5 minutes. Now back to baseline. Currently denies any acute complaints    Review of Systems:    Review of Systems   Constitutional: Negative for activity change, appetite change, chills, diaphoresis, fever and unexpected weight change. HENT: Negative for congestion, facial swelling and rhinorrhea. Eyes: Negative for photophobia and visual disturbance. Respiratory: Negative for cough, shortness of breath and wheezing. Cardiovascular: Negative for chest pain and palpitations. Gastrointestinal: Negative for abdominal pain, blood in stool, constipation, diarrhea, nausea and vomiting. Genitourinary: Negative for decreased urine volume, difficulty urinating, dysuria, flank pain, frequency, hematuria and urgency. Musculoskeletal: Negative for arthralgias, back pain, joint swelling and myalgias. Neurological: Negative for dizziness, syncope, facial asymmetry, light-headedness, numbness and headaches. Psychiatric/Behavioral: Negative for confusion and decreased concentration. The patient is not nervous/anxious.         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 pt denies, documented in cardio note   • Pancreatitis     sphinger of    • Psychiatric disorder    • RA (rheumatoid arthritis) ([address])    • Seizures ([address])    • Ulcerative colitis ([address])    • Visual impairment        Past Surgical History:   Procedure Laterality Date   • ABDOMINAL SURGERY  2017   • APPENDECTOMY     • CHOLECYSTECTOMY     • COLONOSCOPY  2019   • CYSTOSCOPY N/A 6/8/2023    Procedure: CYSTOSCOPY, mini TURBT with fulgeration;  Surgeon: Tra Springer MD;  Location: [facility];  Service: Urology   • EGD AND COLONOSCOPY N/A 09/12/2017    Procedure: EGD AND COLONOSCOPY;  Surgeon: Marina Fortune MD;  Location: [facility]; Service: Gastroenterology   • ERCP N/A 09/15/2017    Procedure: ENDOSCOPIC RETROGRADE CHOLANGIOPANCREATOGRAPHY (ERCP); Surgeon: Sunshine Fung MD;  [facility]: [facility]; Service: Gastroenterology   • HYSTERECTOMY     • KNEE SURGERY Right    • LAPAROSCOPIC ENDOMETRIOSIS FULGURATION     • ORIF ANKLE FRACTURE Left    • FL ARTHRS KNE SURG W/MENISCECTOMY MED/LAT W/SHVG Left 05/12/2017    Procedure: POSSIBLE MEDIAL MENISECTOMY;  Surgeon: Polo Cedeno MD;  Location: [facility];  Service: Orthopedics   • FL ARTHRS KNEE DEBRIDEMENT/SHAVING ARTCLR CRTLG Left 05/12/2017    Procedure: KNEE ARTHROSCOPY EVALUATION, CHONDROPLASTY;  Surgeon: Polo Cedeno MD;  Location: [facility];  Service: Orthopedics   • FL LAPS ABD PRTM&OMENTUM DX W/WO SPEC BR/WA SPX N/A 12/12/2017    Procedure: LAPAROSCOPY DIAGNOSTIC  WITH LYSIS OF ADHESIONS;  Surgeon: Christopher Tinajero DO;  [facility];  Service: General   • UPPER GASTROINTESTINAL ENDOSCOPY  2019       Meds/Allergies:    Prior to Admission medications    Medication Sig Start Date End Date Taking? Authorizing Provider   B-D [facility] LUER-SHAMEKA SYR 25GX1" 25G X 1" 3 ML MISC  6/1/22   Historical Provider, MD   butalbital-acetaminophen-caffeine (FIORICET,ESGIC) -40 mg per tablet Take 1 tablet by mouth every 8 (eight) hours as needed for migraine 6/23/23   Ilda Flowers, DO   cetirizine (ZyrTEC) 10 mg tablet Take 20 mg by mouth daily    Historical Provider, MD   Cholecalciferol 10 MCG /0.025ML LIQD Take 1,000 Units by mouth 2 (two) times a day 1/20/23   Ilda Flowers, DO   cyanocobalamin 1,000 mcg/mL  1/5/21   Historical Provider, MD   diphenhydrAMINE (BENADRYL) 50 mg/mL Inject 1 mL (50 mg total) into a muscle every 6 (six) hours as needed for itching or allergies 6/23/23   Ilda Flowers, DO   EpiPen 2-Malachi 0.3 MG/0.3ML SOAJ INJECT 1 PEN INTO THE MUSCLE AS NEEDED FOR ANAPHALAXIS 5/30/23   Historical Provider, MD   famotidine (PEPCID) 40 MG tablet TAKE ONE TABLET BY MOUTH ONCE DAILY 6/7/23   Lilian Servin PA-C   hydrOXYzine HCL (ATARAX) 25 mg tablet  5/26/23   Historical Provider, MD   Lactobacillus (PROBIOTIC ACIDOPHILUS PO) Take by mouth    Historical Provider, MD   Lactobacillus-Inulin ([address] [place] [address]) CAPS Take 200 mg by mouth daily    Historical Provider, MD   levalbuterol Mo Leaf HFA) 45 mcg/act inhaler Inhale 1-2 puffs every 4 (four) hours as needed for shortness of breath 5/23/23   Ilda Flowers, DO   Levalbuterol HCl [place])  3/31/23   Historical Provider, MD   levothyroxine 100 mcg tablet Take 100 mcg by mouth every other day    Historical Provider, MD   liothyronine (CYTOMEL) 5 mcg tablet Take 1 tablet (5 mcg total) by mouth daily Do not start before June 14, 2023. 6/14/23   Uzma Leiva PA-C   LORazepam (ATIVAN) 1 mg tablet  6/6/23   Historical Provider, MD   montelukast (SINGULAIR) 10 mg tablet  5/13/23   Historical Provider, MD   naloxone (NARCAN) 4 mg/0.1 mL nasal spray Administer 1 spray into a nostril.  If no response after 2-3 minutes, give another dose in the other nostril using a new spray. 6/23/23 6/22/24  Viki Mcgowan DO   NON FORMULARY immunoglobin sq 6g 30 ml once a week    Historical Provider, MD   nystatin (MYCOSTATIN) 500,000 units/5 mL suspension Swish and spit 5 mL (500,000 Units total) 4 (four) times a day 3/10/23   Ana Bonilla PA-C   omalizumab Harriet Narendra) 150 mg Inject 2.4 mL (300 mg total) under the skin every 28 days 7/17/23   Viki Mcgowan DO   pantoprazole (PROTONIX) 40 mg tablet TAKE ONE TABLET BY MOUTH TWICE A DAY 12/30/22   Konrad Adams PA-C   Potassium Chloride 10 % SOLN Take 99 mg by mouth 2 (two) times a day 1/25/23   Historical Provider, MD   predniSONE 20 mg tablet Take 1 tablet (20 mg total) by mouth 2 (two) times a day with meals Take as instructed by physician. 20 mg three times a day for 3 days, then 20 mg twice daily 6/13/23   Theodore Benítez PA-C   Riboflavin (Vitamin B-2) 100 MG TABS Take 4 tablets (400 mg total) by mouth daily 1/20/21 6/23/23  Rosy Bundy MD   Riboflavin (Vitamin B-2) 100 MG TABS Take 100 mg by mouth 4 (four) times a day 1/25/23   Historical Provider, MD Theodore Pena [facility]  4/15/22   Historical Provider, MD   traMADol (Ultram) 50 mg tablet Take 1 tablet (50 mg total) by mouth every 12 (twelve) hours as needed for moderate pain or severe pain 6/23/23   Viki Mcgowan DO     I have reviewed home medications with patient personally. Allergies:    Allergies   Allergen Reactions   • Aimovig [Erenumab-Aooe] Anaphylaxis   • Amitriptyline Anaphylaxis     According to the patient she had nphylaxis   • Aspergillus Allergy Skin Test Other (See Comments), Hives and Swelling   • Azathioprine Other (See Comments) and Anaphylaxis     pancreatitis  According to patient she needed Epipen   • Banana - Food Allergy Itching, Swelling and Anaphylaxis   • Buspar [Buspirone] Hives and Shortness Of Breath   • Citric Acid-Polysorbate 80 Abdominal Pain, Anaphylaxis, Anxiety, Bradycardia, Diarrhea, Fatigue, GI Intolerance, Headache, Hives, Hyperactivity, Itching, Lip Swelling, Nausea Only, Palpitations, Rash, Shortness Of Breath, Tachycardia, Throat Swelling, Tongue Swelling and Vomiting   • Corn Dextrin Anaphylaxis     Any corn based products   • Eggs Or Egg-Derived Products - Food Allergy Anaphylaxis   • Epinephrine Anaphylaxis   • Erenumab Anaphylaxis     patient sent portal message stating she had anaphylaxis  patient sent portal message stating she had anaphylaxis     • Gadolinium Abdominal Pain, Anaphylaxis, Anxiety, Confusion, Delirium, Dizziness, Eye Swelling, Fatigue, GI Intolerance, Headache, Hives, Irritability, Itching, Lip Swelling, Nausea Only, Palpitations, Photosensitivity, Rash, Seizures, Shortness Of Breath, Swelling, Syncope, Throat Swelling, Tongue Swelling, Tremor, Visual Disturbance and Vomiting   • Gelatin - Food Allergy Anaphylaxis   • Heparin Hives     Painful welts    • Lactated Ringers Shortness Of Breath     Pt questions this allergy, pt has received LR multiple times without reaction   • Lavender Oil Anaphylaxis     Other reaction(s): anaphalxis   • Malto Dextrin [Dextrin] Anaphylaxis   • Other Anaphylaxis     Gel caps, maltodextrose, dextrose,grapes, lavender    • Penicillium Notatum Shortness Of Breath and Swelling   • Sumatriptan Anaphylaxis     Bradycardia, sob, back pressure, head pain,throat tightness  According to the patient she had anphylaxis   • Ustekinumab Anaphylaxis   • Contrast [Iodinated Contrast Media] Other (See Comments)     Pt states needs to be premedicated prior to injection   • Corn Oil - Food Allergy - Food Allergy Hives   • Humira [Adalimumab] Other (See Comments)     "I develop drug induced lupus"   • Ibuprofen Hives and Throat Swelling   • Polyethylene Glycol Diarrhea and Vomiting   • Red Dye - Food Allergy Other (See Comments)     intolerance   • Remicade [Infliximab] Other (See Comments)     "I develop drug induced lupus"   • Soy Allergy - Food Allergy Other (See Comments)   • Sulfa Antibiotics Hives and Other (See Comments)   • Sulfate Hives   • Sulfites - Food Allergy Hives   • Chlorhexidine Itching   • Freederm Adhesive Remover [New Skin] Rash   • Histamine Hives, Rash and Other (See Comments)     Intolerance         Social History:     Marital Status: /Civil Union     Patient Pre-hospital Living Situation: home  Patient Pre-hospital Level of Mobility: indepddent  Patient Pre-hospital Diet Restrictions: none  Substance Use History:   Social History     Substance and Sexual Activity   Alcohol Use Never     Social History     Tobacco Use   Smoking Status Never   Smokeless Tobacco Never     Social History     Substance and Sexual Activity   Drug Use Never       Family History:    Family History   Problem Relation Age of Onset   • Ulcerative colitis Mother    • Arthritis Mother    • Colon polyps Mother    • Heart failure Father    • Neuropathy Father    • Macular degeneration Father    • Diabetes Father    • Early death Father    • Vision loss Father    • Asthma Daughter    • Hypothyroidism Daughter    • Heart disease Maternal Grandmother    • Arthritis Maternal Grandmother    • No Known Problems Maternal Grandfather    • Arthritis Paternal Grandmother    • Macular degeneration Paternal Grandmother    • Vision loss Paternal Grandmother    • Lymphoma Paternal Grandfather    • Cancer Paternal Grandfather    • Early death Paternal Grandfather    • Breast cancer Maternal Aunt    • Cancer Maternal Aunt    • Miscarriages / Stillbirths Maternal Aunt    • Heart failure Paternal Aunt    • Heart failure Paternal Aunt    • Irritable bowel syndrome Paternal Aunt    • Breast cancer Paternal Aunt    • Breast cancer additional onset Paternal Aunt    • Hypothyroidism Paternal Aunt    • Cancer Paternal Aunt    • No Known Problems Son    • No Known Problems Son    • Kidney disease Brother    • Depression Paternal Uncle    • Early death Paternal Uncle        Physical Exam:     Vitals:   Blood Pressure: 116/70 (07/19/23 0030)  Pulse: 96 (07/19/23 0030)  Temperature: 97.6 °F (36.4 °C) (07/18/23 1725)  Temp Source: Temporal (07/18/23 1725)  Respirations: 19 (07/19/23 0030)  SpO2: 93 % (07/19/23 0030)    Physical Exam  Constitutional:       General: She is not in acute distress. Appearance: She is well-developed. She is not diaphoretic. HENT:      Head: Normocephalic and atraumatic. Nose: Nose normal.      Mouth/Throat:      Pharynx: No oropharyngeal exudate. Eyes:      General: No scleral icterus. Right eye: No discharge. Left eye: No discharge. Conjunctiva/sclera: Conjunctivae normal.   Neck:      Thyroid: No thyromegaly. Vascular: No JVD. Cardiovascular:      Rate and Rhythm: Normal rate and regular rhythm. Heart sounds: Normal heart sounds. No murmur heard. No friction rub. No gallop. Pulmonary:      Effort: Pulmonary effort is normal. No respiratory distress. Breath sounds: Normal breath sounds. No wheezing or rales. Chest:      Chest wall: No tenderness. Abdominal:      General: Bowel sounds are normal. There is no distension. Palpations: Abdomen is soft. Tenderness: There is no abdominal tenderness. There is no guarding or rebound. Musculoskeletal:         General: No tenderness or deformity. Normal range of motion. Cervical back: Normal range of motion and neck supple. Skin:     General: Skin is warm and dry. Findings: No erythema or rash. Neurological:      Mental Status: She is alert. Mental status is at baseline. Cranial Nerves: No cranial nerve deficit. Sensory: No sensory deficit. Motor: No abnormal muscle tone. Coordination: Coordination normal.             Additional Data:     Lab Results: I have personally reviewed pertinent reports.       Results from last 7 days   Lab Units 07/18/23  1740   WBC Thousand/uL 10.80*   HEMOGLOBIN g/dL 13.8   HEMATOCRIT % 41.3   PLATELETS Thousands/uL 667* NEUTROS PCT % 80*   LYMPHS PCT % 13*   MONOS PCT % 5   EOS PCT % 0     Results from last 7 days   Lab Units 07/18/23  1740   SODIUM mmol/L 141   POTASSIUM mmol/L 4.8   CHLORIDE mmol/L 102   CO2 mmol/L 25   BUN mg/dL 6   CREATININE mg/dL 0.66   ANION GAP mmol/L 14   CALCIUM mg/dL 9.5   ALBUMIN g/dL 4.2   TOTAL BILIRUBIN mg/dL 0.43   ALK PHOS U/L 57   ALT U/L 31   AST U/L 29   GLUCOSE RANDOM mg/dL 155*                 Results from last 7 days   Lab Units 07/18/23  2044 07/18/23  1740   LACTIC ACID mmol/L 1.6 5.1*       Imaging: I have personally reviewed pertinent reports. CT chest abdomen pelvis wo contrast   Final Result by Danish Burden DO (07/18 2131)   Addendum (preliminary) 1 of 1 by Danish Burden DO (07/18 2131)   ADDENDUM:   As stated in the body of the report there is a pleural-based opacity in    the right lower lobe near the minor fissure measuring 1 cm which was not    present previously and may be atelectatic. Follow-up CT recommended in 3    months. Final   Limited exam performed without oral or IV contrast.      No tracheal or endobronchial lesion. No CT evidence of aspiration. Atelectatic changes throughout the left upper lobe/lingula and basilar portions of left lower lobe due to dependent left side down positioning. Trace pneumobilia and slight extrahepatic biliary ductal dilatation, considered normal status post cholecystectomy and stable from previous. Nonobstructing renal collecting system calculi. No hydronephrosis or renal colic. Workstation performed: [id number]         CT head without contrast   Final Result by Lolly Potts DO (07/18 1955)      No acute intracranial abnormality.                   Workstation performed: [id number]         XR chest 1 view portable   ED Interpretation by Ama Cr MD (07/18 1753)   Poor inspiratory effort, no obvious infiltrate          EKG, Pathology, and Other Studies Reviewed on Admission:   · EKG: reviewed    Allscripts / Epic Records Reviewed: Yes     ** Please Note: This note has been constructed using a voice recognition system.  **

## 2023-07-19 NOTE — ASSESSMENT & PLAN NOTE
· History of mast cell activation syndrome involved with mast cell activation specialist at [facility] patient reports had a "mast cell activation attack"  states patient became unresponsive extremities locked up for approximately 3 to 5 minutes reports this is how her syndrome typically presents she reports he typically gets Benadryl steroids and IV fluids  · Currently at baseline  · Mental status at baseline   · Provide 60mg solumedrol and IV benadryl prior to MRI

## 2023-07-19 NOTE — CONSULTS
TeleConsultation - Neurology   Lindsey Douglas 46 y.o. female MRN: [id number]  Unit/Bed#: OE26 Encounter: [id number]        REQUIRED DOCUMENTATION:     1. This service was provided via Telemedicine. 2. Provider located at [facility]  3. TeleMed provider: Desiree Barba DO.  4. Identify all parties in room with patient during tele consult:  Pt and significant other  5. Patient was then informed that this was a Telemedicine visit and that the exam was being conducted confidentially over secure lines. My office door was closed. No one else was in the room. Patient acknowledged consent and understanding of privacy and security of the Telemedicine visit, and gave us permission to have the assistant stay in the room in order to assist with the history and to conduct the exam.  I informed the patient that I have reviewed their record in Epic and presented the opportunity for them to ask any questions regarding the visit today. The patient agreed to participate. Assessment/Plan     Ms. Pamela Bob is a pleasant 47 yo female with significant pmhx - see below- seen in consultation for GTC seizure with tongue laceration and no recall different that her typical non epileptic events. - No further seizures here in ED, thus okay to hold off AED at this juncture  - Recommend MRI Brain done without contrast given patient allergy with contrast (see below)  - Pending this and clinical course can consider transfer to a hospital that has EEG availability.  If MRI benign and patient with no further seizures can obtain OP EEG  - Recommend Depacon 500 mg administer over 15 minutes and benadryl 25 mg for severe migraine patient has at this time  - Limit narcotic pain medication for migraine.  - Limit fioricet for migraine as she reports no benefit with this any more (concern for medication overuse component with this)  - IVF as needed  - Further infection work up med management per primary team  - Check P43, B1, folic acid given limited diet and potential malabsorption in the setting of Crohns disease  - Pt did have iron panel with no significant iron deficiency noted 6/23. Will follow up   D/w primary team attending physician      [facility] will need follow up in in 6 weeks with epilepsy attending or advance practitioner. She will require a routine EEG within 2 weeks. History of Present Illness     Reason for Consult / Principal Problem: seizure like activity different than her typical from non epileptic spells  Hx and PE limited by: tele exam  HPI: Moses Manzano is a 46 y.o.  female with mast cell activation syndrome, Crohns disease- follows with GI, history nonepileptiform seizures with prior routine EEG capturing an event of whole body paralysis preceded by twitching and left head turning with no epileptiform correlate per EEG 5/31/23, migraine, who presents with seizure like activity different than her normal, per patient and . Patient states she recalls being on the porch and then no further recall until she was at the hospital. Per significant other patient shook all over was tense, could not breathe, was foaming at the mouth and blue and thus EMS was called. Episode last 3-5 minutes and patient started waking up after coming to the ED. Patient reports this was different than her typical seizures as she bit her tongue (she has echymosses visible on this teleconsult), and had no recall. Denies any specific inciting event or trigger. Denies any recent change in medication. Yesterday did have lactose acidosis of 5.1 that has normalized. Patient reports that she has been having her chronic migraines more refractory over the last few months with photophobia phonophobia nausea. She reports that she is very limited in what she can eat with her mast cell activation syndrome and Crohns thus often gets IVF hydration at [facility]- I do so this on chart review.      also reports chronic insomnia    Pt states she cannot have MRI with contrast as it triggered anaphylaxis in past even with solumedrol and benadryl- states can only have without contrast MRI as the magnet can cause "a mast cell attack". Pt also requests Ativan pre MRI. I did relay above to primary team attending physician    Pt with significant allergies to triptans states toradol/ketorolac with a/e, and aimovig with anaphylaxis type reaction. She has not tried Depacon for migraines. 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 pt denies, documented in cardio note   • Pancreatitis     sphinger of    • Psychiatric disorder    • RA (rheumatoid arthritis) ([address])    • Seizures ([address])    • Ulcerative colitis ([address])    • Visual impairment      Past Surgical History:   Procedure Laterality Date   • ABDOMINAL SURGERY  2017   • APPENDECTOMY     • CHOLECYSTECTOMY     • COLONOSCOPY  2019   • CYSTOSCOPY N/A 6/8/2023    Procedure: CYSTOSCOPY, mini TURBT with fulgeration;  Surgeon: Fernanda Lyon MD;  Location: [facility];  Service: Urology   • EGD AND COLONOSCOPY N/A 09/12/2017    Procedure: EGD AND COLONOSCOPY;  Surgeon: Marylin Francois MD;  [facility]: [facility];   Service: Gastroenterology   • ERCP N/A 09/15/2017    Procedure: ENDOSCOPIC RETROGRADE CHOLANGIOPANCREATOGRAPHY (ERCP); Surgeon: Anali Sheridan MD;  [facility];   Service: Gastroenterology   • HYSTERECTOMY     • KNEE SURGERY Right    • LAPAROSCOPIC ENDOMETRIOSIS FULGURATION     • ORIF ANKLE FRACTURE Left    • IA ARTHRS KNE SURG W/MENISCECTOMY MED/LAT W/SHVG Left 05/12/2017    Procedure: POSSIBLE MEDIAL MENISECTOMY;  Surgeon: Shilpa Rasheed MD;  Location: [facility];  Service: Orthopedics   • IA ARTHRS KNEE DEBRIDEMENT/SHAVING ARTCLR CRTLG Left 05/12/2017    Procedure: KNEE ARTHROSCOPY EVALUATION, CHONDROPLASTY;  Surgeon: Shilpa Rasheed MD;  Location: [facility];  Service: Orthopedics   • IA LAPS ABD PRTM&OMENTUM DX W/WO [facility] BR/WA [address] [facility] N/A 12/12/2017    Procedure: LAPAROSCOPY DIAGNOSTIC  WITH LYSIS OF ADHESIONS;  Surgeon: Alex Lazo DO;  Location:  MAIN OR;  Service: General   • UPPER GASTROINTESTINAL ENDOSCOPY  2019     Social History   Social History     Substance and Sexual Activity   Alcohol Use Never     Social History     Substance and Sexual Activity   Drug Use Never     E-Cigarette/Vaping   • E-Cigarette Use Never User      E-Cigarette/Vaping Substances   • Nicotine No    • THC No    • CBD No    • Flavoring No    • Other No    • Unknown No      Social History     Tobacco Use   Smoking Status Never   Smokeless Tobacco Never     Family History:   Family History   Problem Relation Age of Onset   • Ulcerative colitis Mother    • Arthritis Mother    • Colon polyps Mother    • Heart failure Father    • Neuropathy Father    • Macular degeneration Father    • Diabetes Father    • Early death Father    • Vision loss Father    • Asthma Daughter    • Hypothyroidism Daughter    • Heart disease Maternal Grandmother    • Arthritis Maternal Grandmother    • No Known Problems Maternal Grandfather    • Arthritis Paternal Grandmother    • Macular degeneration Paternal Grandmother    • Vision loss Paternal Grandmother    • Lymphoma Paternal Grandfather    • Cancer Paternal Grandfather    • Early death Paternal Grandfather    • Breast cancer Maternal Aunt    • Cancer Maternal Aunt    • Miscarriages / Stillbirths Maternal Aunt    • Heart failure Paternal Aunt    • Heart failure Paternal Aunt    • Irritable bowel syndrome Paternal Aunt    • Breast cancer Paternal Aunt    • Breast cancer additional onset Paternal Aunt    • Hypothyroidism Paternal Aunt    • Cancer Paternal Aunt    • No Known Problems Son    • No Known Problems Son    • Kidney disease Brother    • Depression Paternal Uncle    • Early death Paternal Uncle        Review of previous medical records was  completed.     Meds/Allergies   all current active meds have been reviewed    Allergies   Allergen Reactions   • Aimovig [Erenumab-Aooe] Anaphylaxis   • Amitriptyline Anaphylaxis     According to the patient she had nphylaxis   • Aspergillus Allergy Skin Test Other (See Comments), Hives and Swelling   • Azathioprine Other (See Comments) and Anaphylaxis     pancreatitis  According to patient she needed Epipen   • Banana - Food Allergy Itching, Swelling and Anaphylaxis   • Buspar [Buspirone] Hives and Shortness Of Breath   • Citric Acid-Polysorbate 80 Abdominal Pain, Anaphylaxis, Anxiety, Bradycardia, Diarrhea, Fatigue, GI Intolerance, Headache, Hives, Hyperactivity, Itching, Lip Swelling, Nausea Only, Palpitations, Rash, Shortness Of Breath, Tachycardia, Throat Swelling, Tongue Swelling and Vomiting   • Corn Dextrin Anaphylaxis     Any corn based products   • Eggs Or Egg-Derived Products - Food Allergy Anaphylaxis   • Epinephrine Anaphylaxis   • Erenumab Anaphylaxis     patient sent portal message stating she had anaphylaxis  patient sent portal message stating she had anaphylaxis     • Gadolinium Abdominal Pain, Anaphylaxis, Anxiety, Confusion, Delirium, Dizziness, Eye Swelling, Fatigue, GI Intolerance, Headache, Hives, Irritability, Itching, Lip Swelling, Nausea Only, Palpitations, Photosensitivity, Rash, Seizures, Shortness Of Breath, Swelling, Syncope, Throat Swelling, Tongue Swelling, Tremor, Visual Disturbance and Vomiting   • Gelatin - Food Allergy Anaphylaxis   • Heparin Hives     Painful welts    • Lactated Ringers Shortness Of Breath     Pt questions this allergy, pt has received LR multiple times without reaction   • Lavender Oil Anaphylaxis     Other reaction(s): anaphalxis   • Malto Dextrin [Dextrin] Anaphylaxis   • Other Anaphylaxis     Gel caps, maltodextrose, dextrose,grapes, lavender    • Penicillium Notatum Shortness Of Breath and Swelling   • Sumatriptan Anaphylaxis     Bradycardia, sob, back pressure, head pain,throat tightness  According to the patient she had anphylaxis   • Ustekinumab Anaphylaxis   • Contrast [Iodinated Contrast Media] Other (See Comments)     Pt states needs to be premedicated prior to injection   • Corn Oil - Food Allergy - Food Allergy Hives   • Humira [Adalimumab] Other (See Comments)     "I develop drug induced lupus"   • Ibuprofen Hives and Throat Swelling   • Polyethylene Glycol Diarrhea and Vomiting   • Red Dye - Food Allergy Other (See Comments)     intolerance   • Remicade [Infliximab] Other (See Comments)     "I develop drug induced lupus"   • Soy Allergy - Food Allergy Other (See Comments)   • Sulfa Antibiotics Hives and Other (See Comments)   • Sulfate Hives   • Sulfites - Food Allergy Hives   • Chlorhexidine Itching   • Freederm Adhesive Remover [New Skin] Rash   • Histamine Hives, Rash and Other (See Comments)     Intolerance         Objective   Vitals:Blood pressure 115/72, pulse 102, temperature 98 °F (36.7 °C), temperature source Temporal, resp. rate 19, height 5' 5" (1.651 m), weight 71.5 kg (157 lb 10.1 oz), SpO2 90 %, not currently breastfeeding. ,Body mass index is 26.23 kg/m².     Intake/Output Summary (Last 24 hours) at 7/19/2023 1111  Last data filed at 7/18/2023 0930  Gross per 24 hour   Intake 1000 ml   Output 600 ml Net 400 ml       Invasive Devices: Invasive Devices     Peripheral Intravenous Line  Duration           Peripheral IV -- days    Peripheral IV 07/18/23 Distal;Right;Ventral (anterior) Forearm <1 day                Physical Exam  Neurologic Exam    Modified PE as this is a video consultation:  Gen: Appears somewhat distressed, on occasion tearful  HEENT:  No Septal deviation EOMI NCAT. Resp:  Symmetric chest rise and patient in no obvious respiratory distress  MSK: ROM normal  Skin: No rash noted in visualized portion of this exam    Neuroexam:  AO X person place, not month or year (states June 2022 incorrectly). No aphasia or dysarthria, patient is able to follow 2 and 3 step commands with ease   CN 2-12 grossly intact however does not let me test EOM as she reports eye pain with her migraine and closes her eyes  Motor:  Intact antigravity X 4 extremities. No Pronator drift noted. Sensation: Intact to light touch in all extremities  Cerebellar: No dysmetria b/l with FNF testing. PAUL with no dysdiadochokinesia  Gait:  deferred      Lab Results: I have personally reviewed pertinent reports. Imaging Studies: I have personally reviewed pertinent reports. EKG, Pathology, and Other Studies: I have personally reviewed pertinent reports. Code Status: Level 1 - Full Code  Advance Directive and Living Will:      Power of :    POLST:      Counseling / Coordination of Care  Total time spent today 60 minutes. Greater than 50% of total time was spent with the patient and / or family counseling and / or coordination of care.  A description of the counseling / coordination of care: as noted above in A/P, including relaying plan of care to primary team attending physician

## 2023-07-19 NOTE — ASSESSMENT & PLAN NOTE
Has a history of psychogenic seizures  Appears to have had a tonic-clonic true seizure at home per  given elevated lactic acid suspect did have true seizure  Obtain MRI brain without contrast   Discussed with neuro, follow up formal consult

## 2023-07-19 NOTE — PROGRESS NOTES
[address]  Progress Note  Name: Elise Dewey  MRN: [id number]  Unit/Bed#: ZV82 I Date of Admission: 7/18/2023   Date of Service: 7/19/2023 I Hospital Day: 0    Assessment/Plan   Acute encephalopathy  Assessment & Plan  Has a history of psychogenic seizures  Appears to have had a tonic-clonic true seizure at home per  given elevated lactic acid suspect did have true seizure  Obtain MRI brain without contrast   Discussed with neuro, follow up formal consult     * 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[address])  Assessment & Plan  · History of mast cell activation syndrome involved with mast cell activation specialist at [facility] patient reports had a "mast cell activation attack"  states patient became unresponsive extremities locked up for approximately 3 to 5 minutes reports this is how her syndrome typically presents she reports he typically gets Benadryl steroids and IV fluids  · Currently at baseline  · Mental status at baseline   · Provide 60mg solumedrol and IV benadryl prior to MRI     Hypothyroidism (acquired)  Assessment & Plan  Continue Synthroid    Bladder tumor  Assessment & Plan  Follows outpatient with urology    Crohn's colitis [facility])  Assessment & Plan  History of Crohn's  Continue outpatient follow-up             Progress Note - Tor Franklin 46 y.o. female MRN: [id number]    Unit/Bed#: JC42 Encounter: [id number]        Subjective:   Patient seen and examined , appears well and complains of headache    Objective:     Vitals:   Vitals:    07/19/23 1200   BP: 140/87   Pulse: 105   Resp: 20   Temp:    SpO2: 97%     Body mass index is 26.23 kg/m².     Intake/Output Summary (Last 24 hours) at 7/19/2023 1223  Last data filed at 7/19/2023 1130  Gross per 24 hour   Intake 1050 ml   Output 600 ml   Net 450 ml       Physical Exam:   /87   Pulse 105   Temp 98 °F (36.7 °C) (Temporal)   Resp 20   Ht 5' 5" (1.651 m)   Wt 71.5 kg (157 lb 10.1 oz)   SpO2 97%   BMI 26.23 kg/m² General appearance: alert and oriented, in no acute distress  Head: NCAT/ evidence of tongue biting on right lateral aspect  Lungs: clear to auscultation bilaterally  Heart: regular rate and rhythm, S1, S2 normal, no murmur, click, rub or gallop  Abdomen: soft, non-tender; bowel sounds normal; no masses,  no organomegaly  Extremities: extremities normal, warm and well-perfused; no cyanosis, clubbing, or edema  Pulses: 2+ and symmetric  Neurologic: Grossly normal     Invasive Devices     Peripheral Intravenous Line  Duration           Peripheral IV 07/18/23 Distal;Right;Ventral (anterior) Forearm <1 day                Results from last 7 days   Lab Units 07/18/23  1740   WBC Thousand/uL 10.80*   HEMOGLOBIN g/dL 13.8   HEMATOCRIT % 41.3   PLATELETS Thousands/uL 667*       Results from last 7 days   Lab Units 07/18/23  1740   POTASSIUM mmol/L 4.8   CHLORIDE mmol/L 102   CO2 mmol/L 25   BUN mg/dL 6   CREATININE mg/dL 0.66   CALCIUM mg/dL 9.5   ALK PHOS U/L 57   ALT U/L 31   AST U/L 29       Medication Administration - last 24 hours from 07/18/2023 1223 to 07/19/2023 1223       Date/Time Order Dose Route Action Action by     07/18/2023 1750 EDT ondansetron (ZOFRAN) injection 4 mg 4 mg Intravenous Given David Taylor RN     07/18/2023 2148 EDT sodium chloride 0.9 % bolus 1,000 mL 0 mL Intravenous Stopped David Taylor RN     07/18/2023 1918 EDT sodium chloride 0.9 % bolus 1,000 mL 1,000 mL Intravenous New Bag David Taylor RN     07/18/2023 1929 EDT methylPREDNISolone sodium succinate (Solu-MEDROL) injection 60 mg 60 mg Intravenous Given David Taylor RN     07/18/2023 1929 EDT diphenhydrAMINE (BENADRYL) injection 25 mg 25 mg Intravenous Given David Taylor RN     07/18/2023 1931 EDT droperidol (INAPSINE) injection 0.3 mg 0.3 mg Intravenous Given David Taylor RN     07/18/2023 2115 EDT droperidol (INAPSINE) injection 0.3 mg 0.3 mg Intravenous Given David Taylor RN     07/19/2023 1130 EDT sodium chloride 0.9 % infusion 0 mL/hr Intravenous Stopped Sulaiman Millard, ROLLY     07/18/2023 2156 EDT sodium chloride 0.9 % infusion 100 mL/hr Intravenous [facility], RN     07/19/2023 0445 EDT ondansetron (ZOFRAN) injection 4 mg 4 mg Intravenous Given Sulaiman Millard RN     07/19/2023 0440 EDT methylPREDNISolone sodium succinate (Solu-MEDROL) injection 40 mg 40 mg Intravenous Given Sulaiman Millard, ROLLY     07/19/2023 0816 EDT loratadine (CLARITIN) tablet 10 mg 10 mg Oral Not Given Sulaiman Millard RN     07/19/2023 9381 EDT famotidine (PEPCID) tablet 40 mg 40 mg Oral Not Given Sulaiman Millard RN     07/19/2023 1055 EDT butalbital-acetaminophen-caffeine (FIORICET,ESGIC) -40 mg per tablet 1 tablet 1 tablet Oral Given Sulaiman Millard RN     07/19/2023 8063 EDT liothyronine (CYTOMEL) tablet 5 mcg 5 mcg Oral Not Given Sulaiman Millard, ROLLY     07/19/2023 7629 EDT montelukast (SINGULAIR) tablet 10 mg 10 mg Oral Not Given Sulaiman Millard, ROLLY     07/19/2023 8486 EDT pantoprazole (PROTONIX) EC tablet 40 mg 40 mg Oral Not Given Sulaiman Millard RN     07/18/2023 2154 EDT fentanyl citrate (PF) 100 MCG/2ML 25 mcg 25 mcg Intravenous Given Tesha Powell, ROLLY     07/18/2023 2257 EDT diphenhydrAMINE (BENADRYL) injection 25 mg 25 mg Intravenous Given Yuni Greenberg, ROLLY     07/19/2023 0440 EDT diphenhydrAMINE (BENADRYL) injection 50 mg 50 mg Intravenous Given Sulaiman Millard, ROLLY     07/19/2023 0309 EDT LORazepam (ATIVAN) injection 1 mg 1 mg Intravenous Given Lida Rick, ROLLY     07/19/2023 1348 EDT EPINEPHrine PF (ADRENALIN) 1 mg/mL injection 0.5 mg 0.5 mg Intramuscular Given Yuni Greenberg, ROLLY     07/19/2023 0521 EDT HYDROmorphone (DILAUDID) injection 0.5 mg 0.5 mg Intravenous Given Sulaiman Millard RN     07/19/2023 0543 EDT LORazepam (ATIVAN) injection 1 mg 1 mg Intravenous Given Sulaiman Millard RN     07/19/2023 9803 EDT cefTRIAXone (ROCEPHIN) IVPB (premix in dextrose) 1,000 mg 50 mL 0 mg Intravenous Stopped Sulaiman Millard RN 07/19/2023 0919 EDT cefTRIAXone (ROCEPHIN) IVPB (premix in dextrose) 1,000 mg 50 mL 1,000 mg Intravenous New Bag Brett Mckoy RN            Lab, Imaging and other studies: I have personally reviewed pertinent reports.     VTE Pharmacologic Prophylaxis: Enoxaparin (Lovenox)  VTE Mechanical Prophylaxis: sequential compression device     Melissa Guevara MD  7/19/2023,12:23 PM

## 2023-07-19 NOTE — CASE MANAGEMENT
Case Management Assessment & Discharge Planning Note    Patient name [facility]  Location PX70/GX35 MRN [id number]  : 1975 Date 2023       Current Admission Date: 2023  Current Admission Diagnosis:Mast cell activation syndrome [facility])   Patient Active Problem List    Diagnosis Date Noted   • Acute encephalopathy 2023   • Systemic lupus erythematosus, unspecified SLE type, unspecified organ involvement status ([address]) 2023   • Bladder tumor 2023   • Seizure ([address])    • Immunosuppression due to chronic steroid use ([address]) 2022   • Atrial tachycardia ([address]) 2022   • Nephrolithiasis 2021   • Anaphylactic reaction 2021   • Intractable nausea and vomiting 2021   • Heart palpitations 2021   • Inflammatory bowel disease 2021   • Current chronic use of systemic steroids 2021   • MS (multiple sclerosis) ([address]) 2021   • Migraine    • Overweight (BMI 25.0-29.9) 2021   • Anorexia 10/17/2020   • Crohn's colitis ([address]) 10/17/2020   • Mild protein-calorie malnutrition ([address]) 2019   • Constipation 09/10/2019   • Mass of left axilla 2019   • Immunosuppressed status ([address]) 2019   • Thrombocytosis 2019   • Proctitis 2019   • Thrush 2019   • Chronic back pain 2019   • Primary localized osteoarthrosis of ankle and foot 2018   • Fibromyalgia 2017   • CHF (congestive heart failure) ([address]) 2017   • Meniere's disease 2017   • Leukocytosis 09/10/2017   • Hypomagnesemia 2017   • Generalized anxiety disorder 2017   • Vomiting and diarrhea 2017   • SIRS (systemic inflammatory response syndrome) ([address]) 2017   • Vitamin D deficiency 2017   • Hypokalemia 2017   • Throat tightness 2017   • Abdominal pain 2017   • Headache 2017   • Disorder of synovium 2017   • Chondromalacia 2017   • Chronic idiopathic urticaria 2017   • 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, 210 [facility] [address] [place] [address]  254 [facility],[address] Floor  [address] [place] [address]  Phone: [phone] Fax: [phone]    [facility] (Special[facility]) [address] [place] [address] 17[address] [facility]  3201 [facility] 5[address] [facility]  Phone: [phone] Fax: [phone]    Primary Care Provider: Leonid ,     Primary Insurance: [address] [place] [address]  Secondary Insurance: MEDICARE    ASSESSMENT:    CM met with patient spouse at the bedside,baseline information  was obtained. CM discussed the role of CM in helping the patient develop a discharge plan and assist the patient in carry out their plan. Patient lives with spouse and there 2 adult sons. Patient spouse stated patient had seizure on there porch yesterday they were sitting talking and patient became still arms and legs extended and drooling he lower her to floor and placed on left side called 911. Spouse stated this was the worse seizure she ever had. Spouse stated patient has not been taking since seizure yesterday. Patient is very sensitive to all scents. Spouse stated last few months patient has been bathing and dressing daily and then back to bed. Very weak    Spouse stated patient has GI and Urinary symptoms that are painful and difficult to deal with.       Albino Ivey Representative - Spouse   Primary Phone: [phone] [facility])  Home Phone: [phone]               Advance Directives  Does patient have a [address] [place] [address] Avenue?: No  Was patient offered paperwork?: Yes (provided at bedside)  Does patient currently have a Health Care decision maker?: Yes, please see Health Care Proxy section Tabatha Muniz)  Does patient have Advance Directives?: No  Was patient offered paperwork?: Yes (provided at bedside)  Primary Contact: Tabatha Muniz spouse         Readmission Root Cause  30 Day Readmission: No    Patient Information  Admitted from[de-identified] Home  Mental Status: Alert  During Assessment patient was accompanied by: Not accompanied during assessment  Assessment information provided by[de-identified] Patient  Primary Caregiver: Spouse  Caregiver's Name[de-identified] Tabatha Muniz spouse  Caregiver's Relationship to Patient[de-identified] Significant Other  Caregiver's Telephone Number[de-identified] see face sheet  Support Systems: Spouse/significant other  [place] [facility]: [address] [place] [address] do you live in?: [address] [place] [address] Drive entry access options.  Select all that apply.: Stairs  Number of steps to enter home.: 10  Do the steps have railings?: No  Type of Current Residence: [place]  In the last 12 months, was there a time when you were not able to pay the mortgage or rent on time?: No  In the last 12 months, how many places have you lived?: 1  In the last 12 months, was there a time when you did not have a steady place to sleep or slept in a shelter (including now)?: No  Homeless/housing insecurity resource given?: N/A  Living Arrangements: Lives w/ Spouse/significant other  Is patient a ?: No    Activities of Daily Living Prior to Admission  Functional Status: Independent  Completes ADLs independently?: Yes  Ambulates independently?: Yes  Does patient use assisted devices?: No  Does patient currently own DME?: No  Does patient have a history of Outpatient Therapy (PT/OT)?: No  Does the patient have a history of Short-Term Rehab?: No  Does patient have a history of HHC?: No  Does patient currently have [facility]?: No         Patient Information Continued  Income Source: SSI/SSD  Does patient have prescription coverage?: Yes  Within the past 12 months, you worried that your food would run out before you got the money to buy more.: Never true  Within the past 12 months, the food you bought just didn't last and you didn't have money to get more.: Never true  Food insecurity resource given?: N/A  Does patient receive dialysis treatments?: No  Does patient have a history of substance abuse?: No  Does patient have a history of Mental Health Diagnosis?: No         Means of Transportation  Means of Transport to Appts[de-identified] Family transport  In the past 12 months, has lack of transportation kept you from medical appointments or from getting medications?: No  In the past 12 months, has lack of transportation kept you from meetings, work, or from getting things needed for daily living?: No  Was application for public transport provided?: N/A        DISCHARGE DETAILS:    Discharge planning discussed with[de-identified] spouse at bedside        CM contacted family/caregiver?: Yes (spouse [place])  Were Treatment Team discharge recommendations reviewed with patient/caregiver?: Yes  Did patient/caregiver verbalize understanding of patient care needs?: Yes  Were patient/caregiver advised of the risks associated with not following Treatment Team discharge recommendations?: Yes    Contacts  Patient Contacts: Jean Gonzalez spouse  Contact Method:  In Person  Reason/Outcome: Discharge Planning         Would you like to participate in our [address] service program?  : No - Declined    Cm to follow

## 2023-07-19 NOTE — ED NOTES
states pt is asking for pain medication again for headache. Per [employer] Text from Dr. Thor Neville no additional orders.   Neurology will see patient in morning and he will add pre-medication for MRI     Segundo Espinal RN  07/19/23 3813

## 2023-07-19 NOTE — ED NOTES
Note delayed d/t pt care. At apporx 4422 pt  rang call bell, requesting iv ativan for pt. Pt stating "It hurts I cant pee." Tramadol pain med available pt declined med. Pt bladder scanned by this RN and Adrian Kim RN present at bedside. Pt had 47ml in bladder. Pt  became agitated and screamed I can't take this anymore, somebody do something I want to see a doc!" Dr. Laurier Rinne made aware of the situation via tiger text.       Grecia Garcia RN  07/19/23 2262

## 2023-07-19 NOTE — ED ATTENDING ATTESTATION
7/18/2023  IEstela, DO, saw and evaluated the patient. I have discussed the patient with Dr Ronald Acharya and agree with her findings, Plan of Care, and MDM as documented in her note, except where noted. All available labs and Radiology studies were reviewed.  I was present for key portions of any procedure(s) performed by Dr Ronald Acharya and I was immediately available to provide assistance. At this point I agree with the current assessment done in the Emergency Department. I have conducted an independent evaluation of this patient. The history and physical is as follows:    57-year-old woman history as noted presents to the emergency department following episode of loss of consciousness about 20 minutes prior to arrival in which she suddenly became tense in her upper extremities, unresponsive, began audibly aspirating, began foaming at the mouth, and bit her tongue. This was witnessed by her . Patient's  states that she appeared cyanotic for several minutes. Has a history of MCAS and apparently this is comparable to prior episodes. Has been seen in this emergency department multiple times for this condition as well as several outside allergists. Patient's  notes that the symptoms were more prolonged and severe compared to prior episodes. She has recovered somewhat from the initial symptoms but is not back to baseline currently. Treated for UTI recently with last dose of cephalexin today. Urine culture was meant to be performed from the initial sample demonstrating UTI but this was not apparently performed. Did not have obvious preceding sx of dysuria/urgency/frequency. Did not eat anything today. Does use IM diphenhydramine at home from attacks but she did not take any today. No other recent medication changes apart from the cephalexin. Patient later reported bilateral frontal headache with photophobia/phonophobia. This was typical of sx following MCAS episodes by her report.   Did not head at any point during this episode. ROS as per HPI; otherwise negative. General: Awake/alert/moderate distress. Lying in left lateral recumbent position upon my initial evaluation  Vital signs and nursing notes reviewed    HENT:  Normocephalic/atraumatic  External ear/hearing wnl bilaterally. Eyes:  PERRLA; EOMI  No gaze deviation during episodes identified by patient's  as seizure; patient continued to track items/people in room and attend to stimuli  Vision grossly intact    Neck:  Phonation normal with no stridor/dysphonia: initially nonverbal  Normal active ROM. No palpable masses or thyromegaly. No overlying skin changes. Cardiac:  Radial pulses 2+ bilaterally  DP/PT pulses 2+ bilaterally  RRR with s1/s2; no m/r/g. Pulmonary:   No respiratory distress. No accessory muscle use  Lungs CTA b/l with no w/c/r    Abdomen:  Flat. Nondistended. Mild generalized ttp. No palpable masses. No guarding/rebound ttp. Skin:  Warm/dry. No diaphoresis. No visible rashes or skin changes. Neuro:  Nonverbal during my initial examination; did become verbal later during the ED course. Awake and attentive to surroundings. GCS [id number]. PERRLA; EOMI. Facial expressions symmetric. Tongue/uvula midline. Shoulder shrug equal bilaterally  Strength 5/5 in UE/LE bilaterally  Intact sensation in UE/LE bilaterally. Tremulous in extremities without specific tonic/clonic activity. No rigidity or hypertonicity in extremities. No dysarthria or aphasia    A/P: Presentation familiar based on prior ED presentation and extensive history available for review in epic. Patient not having any active seizure-like activity during any time in the ED course although she did demonstrate significant tremulousness in the extremities at various times and these episodes were described by her  as a seizure. She had in fact recently undergone an EEG (29 May 2023; reviewed in [address] [place] [address]) which demonstrated PNES.  All of this reduced suspicion that patient was experiencing a neurogenic seizure. Extensive work-up was performed to identify any obvious triggers such as infection/electrolyte disturbance/ACS/etc. CT of the head was performed which did not demonstrate any abnormal findings. CT of the chest/abdomen/pelvis also did not demonstrate any identifiable triggers for patient's decompensation. Laboratory work-up was unremarkable. Patient was observed for an extended period of time in the emergency department while this was ongoing and had several episodes in which again she became tremulous and shaking but again without any loss of consciousness, loss of postural tone, or focality to sx. Combination of steroid/diphenhydramine/IV fluids that she has received previously were administered along with droperidol for headache. This did notably improve her sx. She did sleep for a period of time after administration of droperidol but awoke stating that she still had a headache. Unfortunately unable to tolerate any oral medications in the emergency department (she noted that Fioricet and tramadol were typically helpful to her with these sx). Due to persistent alteration in mental status without return to baseline, patient requires admission. Consultation was made with internal medicine who accepted patient in observation admission. ED Course  ED Course as of 07/18/23 2303   Tue Jul 18, 2023   1801 CBC and differential(!)  Mild leukocytosis. Hemoglobin/hematocrit normal.  Elevated platelets, likely acute phase reaction   1817 HS Troponin 0hr (reflex protocol)   1817 Coma Panel   1819 Lipase   1819 Coma Panel(!)   1819 CK   1819 Comprehensive metabolic panel(!)   2171 Lactic acid, plasma (w/reflex if result > 2.0)(!!)   1958 CT head without contrast  FINDINGS:     PARENCHYMA:  No intracranial mass, mass effect or midline shift. No CT signs of acute infarction.   No acute parenchymal hemorrhage.     VENTRICLES AND EXTRA-AXIAL SPACES:  Normal for the patient's age.     VISUALIZED ORBITS: Normal visualized orbits.     PARANASAL SINUSES: Cyst versus polyp is seen within the left maxillary sinus.     CALVARIUM AND EXTRACRANIAL SOFT TISSUES:  Normal.     IMPRESSION:     No acute intracranial abnormality.      2015 ECG NSR 90 bpm   QRS 78 QTc 455  No acute st/t changes  No ectopy  No Q waves  Interpreted by me   2046 CT chest abdomen pelvis wo contrast        IMPRESSION:  Limited exam performed without oral or IV contrast.     No tracheal or endobronchial lesion. No CT evidence of aspiration.     Atelectatic changes throughout the left upper lobe/lingula and basilar portions of left lower lobe due to dependent left side down positioning.     Trace pneumobilia and slight extrahepatic biliary ductal dilatation, considered normal status post cholecystectomy and stable from previous.     Nonobstructing renal collecting system calculi. No hydronephrosis or renal colic.          Critical Care Time  Procedures

## 2023-07-19 NOTE — ASSESSMENT & PLAN NOTE
· History of mast cell activation syndrome involved with mast cell activation specialist at  patient reports had a "mast cell activation attack"  states patient became unresponsive extremities locked up for approximately 3 to 5 minutes reports this is how her syndrome typically presents she reports he typically gets Benadryl steroids and IV fluids  · Currently at baseline  · Mental status at baseline   ·  will provide IV steroids and Benadryl and IV fluids for now

## 2023-07-19 NOTE — UTILIZATION REVIEW
TOI:  UNABLE TO OPEN PAGE 2 FOR ADMISSION SUBMISSION     Initial Clinical Review    Admission: Date/Time/Statement:     OBSERVATION 7-18-23 CHANGED TO INPATIENT 7-21-23 FOR CONTINUED TREATMENT OF MAST CELL SYNDROME WITH IV SOLUMEDROL, IV BENADRYL AND IV .9% /HR, IV CEFTRIAXONE.       07/20/23 1311  Inpatient Admission  Once        Transfer Service: Hospitalist    Question Answer   Level of Care Med Surg   Estimated length of stay More than 2 Midnights         07/18/23 2111  Place in Observation  Once        Transfer Service: Hospitalist    Question: Level of Care Answer: Med Surg            ED Arrival Information     Expected   -    Arrival   7/18/2023 17:13    Acuity   Emergent            Means of arrival   Ambulance    Escorted by   [facility] WITH [facility]    Service   Hospitalist    Admission type   Emergency            Arrival complaint   Seizure           Chief Complaint   Patient presents with   • Medical Problem     Per EMS patient is having a Mast Cell Activation Storm. Reports this has happened in the past but not "this bad"       Initial Presentation: 46 y.o. female presents to ed from home for evaluation and treatment of mast cell activation storm with migraine, nausea, abdominal pain, altered mental status.  reports tonic clonic seizure at home. PMHX:  Psychogenic seizures. Mast cell syndrome. Clinical assessment significant for pain 10/10. Tachycardia m  hypoxia  89% ra, tachypnea 22 - 45. UA + Babiturate, LA 5.1. Initially treated with iv zofran, iv .9% ns bolus, iv solumedrol, iv droperidol, Admit to observation     Date: 7-19-23   Day 2: observation    Mental status improved. UDS + barbiturate. UA : large leukocytes. Give iv solumedrol, iv fluids and iv benadryl prior to MRI. Received iv zofran x1, iv dilaudid and iv atnivan x1 . Start iv ceftriaxone. 7-20-23 observation changed to  inpatient med surg   Iv ceftriaxone, iv benadryl, iv solumedrol, iv 0.9% ns 100/hr. 7-21-23 Day 2 inpatient med surg  Patient with Chron's colitis is reporting RUQ pain which is not like her colitis. US shows dilated common bile duct. MRCP with no choledocholithiasis. 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 MRI of brain due to encephalopathy unremarkable. Obtain routine EEG,  If seizure like events persist neurology will consider transfer to higher level of care. ED Triage Vitals [07/18/23 1725]   97.6 °F (36.4 °C) 97 16 123/86 97 %      Temporal Monitor         Pain Score       5          07/19/23 71.5 kg (157 lb 10.1 oz)     Additional Vital Signs:    Patient Vitals for the past 24 hrs:   BP Temp Pulse Resp SpO2   07/19/23 1400 115/64 -- 91 20 93 %   07/19/23 1300 109/67 -- 89 19 93 %   07/19/23 1230 118/82 -- 96 20 94 %   07/19/23 1200 140/87 -- 105 20 97 %   07/19/23 1130 126/84 -- 101 20 95 %   07/19/23 1000 115/72 98 °F (36.7 °C) 102 19 90 %   07/19/23 0900 113/76 98.7 °F (37.1 °C) 96 18 94 %   07/19/23 0728 -- -- -- -- 91 %   07/19/23 0704 -- -- -- -- --   07/19/23 0700 110/68 98.5 °F (36.9 °C) 99 19 91 %   07/19/23 0630 113/65 -- 98 -- 91 %   07/19/23 0545 -- -- (!) 112 22 95 %   07/19/23 0544 156/89 -- (!) 114 -- 92 %   07/19/23 0400 120/84 -- 102 (!) 45 (!) 89 %   07/19/23 0342 132/78 -- 101 22 94 %   07/19/23 0200 120/74 -- 101 20 95 %   07/19/23 0130 109/66 -- 97 19 99 %   07/19/23 0100 111/71 -- 98 20 94 %   07/19/23 0030 116/70 -- 96 19 93 %   07/18/23 2130 120/82 -- 90 18 98 %   07/18/23 2006 130/81 -- 92 15 95 %   07/18/23 1900 142/97 -- (!) 106 17 96 %   07/18/23 1725 123/86 97.6 °F (36.4 °C) 97 16 97 %   . Pertinent Labs/Diagnostic Test Results:     MRI brain seizure wo contrast   Final  (07/19 1552)      1. No acute infarction, edema, or mass effect. 2. There is no evidence of mesial temporal sclerosis. . The hippocampal volumes are preserved with incidental bilateral sulcus remnant cysts . 3.  Left maxillary sinus retention cyst.         CT chest abdomen pelvis wo contrast   Final (07/18 2131)   Final   Limited exam performed without oral or IV contrast.      No tracheal or endobronchial lesion. No CT evidence of aspiration. Atelectatic changes throughout the left upper lobe/lingula and basilar portions of left lower lobe due to dependent left side down positioning. Trace pneumobilia and slight extrahepatic biliary ductal dilatation, considered normal status post cholecystectomy and stable from previous. Nonobstructing renal collecting system calculi. No hydronephrosis or renal colic. CT head without contrast   Final  (07/18 1955)      No acute intracranial abnormality. XR chest 1 view portable      Final  (07/19 1135)      No acute cardiopulmonary disease.             Results from last 7 days   Lab Units 07/18/23  1740   WBC Thousand/uL 10.80*   HEMOGLOBIN g/dL 13.8   HEMATOCRIT % 41.3   PLATELETS Thousands/uL 667*   NEUTROS ABS Thousands/µL 8.69*         Results from last 7 days   Lab Units 07/18/23  1740   SODIUM mmol/L 141   POTASSIUM mmol/L 4.8   CHLORIDE mmol/L 102   CO2 mmol/L 25   ANION GAP mmol/L 14   BUN mg/dL 6   CREATININE mg/dL 0.66   EGFR ml/min/1.73sq m 103   CALCIUM mg/dL 9.5     Results from last 7 days   Lab Units 07/18/23  1740   AST U/L 29   ALT U/L 31   ALK PHOS U/L 57   TOTAL PROTEIN g/dL 6.8   ALBUMIN g/dL 4.2   TOTAL BILIRUBIN mg/dL 0.43         Results from last 7 days   Lab Units 07/18/23  1740   GLUCOSE RANDOM mg/dL 155*         Results from last 7 days   Lab Units 07/18/23  1740   CK TOTAL U/L 56     Results from last 7 days   Lab Units 07/18/23 2044 07/18/23  1740   HS TNI 0HR ng/L  --  2   HS TNI 2HR ng/L 3  --    HSTNI D2 ng/L 1  --        Results from last 7 days   Lab Units 07/18/23 2044 07/18/23  1740   LACTIC ACID mmol/L 1.6 5.1*       Results from last 7 days   Lab Units 07/18/23  1740   LIPASE u/L 11         Results from last 7 days   Lab Units 07/19/23  0014 CLARITY UA  Clear   COLOR UA  Yellow   SPEC GRAV UA  1.015   PH UA  7.5   GLUCOSE UA mg/dl Negative   KETONES UA mg/dl 15 (1+)*   BLOOD UA  Negative   PROTEIN UA mg/dl Negative   NITRITE UA  Negative   BILIRUBIN UA  Negative   UROBILINOGEN UA E.U./dl 0.2   LEUKOCYTES UA  Negative             Results from last 7 days   Lab Units 07/19/23  0014   AMPH/METH  Negative   BARBITURATE UR  Positive*   BENZODIAZEPINE UR  Negative   COCAINE UR  Negative   METHADONE URINE  Negative   OPIATE UR  Negative   PCP UR  Negative   THC UR  Negative     Results from last 7 days   Lab Units 07/18/23  1740   ETHANOL LVL mg/dL <10   ACETAMINOPHEN LVL ug/mL <04*   SALICYLATE LVL mg/dL <5       ED Treatment:   Medication Administration from 07/18/2023 1713 to 07/19/2023 1608       Date/Time Order Dose Route Action     07/18/2023 1750 EDT ondansetron (ZOFRAN) injection 4 mg 4 mg Intravenous Given     07/18/2023 1918 EDT sodium chloride 0.9 % bolus 1,000 mL 1,000 mL Intravenous New Bag     07/18/2023 1929 EDT methylPREDNISolone sodium succinate (Solu-MEDROL) injection 60 mg 60 mg Intravenous Given     07/18/2023 1929 EDT diphenhydrAMINE (BENADRYL) injection 25 mg 25 mg Intravenous Given     07/18/2023 1931 EDT droperidol (INAPSINE) injection 0.3 mg 0.3 mg Intravenous Given     07/18/2023 2115 EDT droperidol (INAPSINE) injection 0.3 mg 0.3 mg Intravenous Given     07/18/2023 2156 EDT sodium chloride 0.9 % infusion 100 mL/hr Intravenous New Bag     07/19/2023 0445 EDT ondansetron (ZOFRAN) injection 4 mg 4 mg Intravenous Given     07/19/2023 0440 EDT methylPREDNISolone sodium succinate (Solu-MEDROL) injection 40 mg 40 mg Intravenous Given     07/19/2023 1055 EDT butalbital-acetaminophen-caffeine (FIORICET,ESGIC) -40 mg per tablet 1 tablet 1 tablet Oral Given     07/18/2023 2154 EDT fentanyl citrate (PF) 100 MCG/2ML 25 mcg 25 mcg Intravenous Given     07/18/2023 2257 EDT diphenhydrAMINE (BENADRYL) injection 25 mg 25 mg Intravenous Given 07/19/2023 0440 EDT diphenhydrAMINE (BENADRYL) injection 50 mg 50 mg Intravenous Given     07/19/2023 0309 EDT LORazepam (ATIVAN) injection 1 mg 1 mg Intravenous Given     07/19/2023 0953 EDT EPINEPHrine PF (ADRENALIN) 1 mg/mL injection 0.5 mg 0.5 mg Intramuscular Given     07/19/2023 9876 EDT HYDROmorphone (DILAUDID) injection 0.5 mg 0.5 mg Intravenous Given     07/19/2023 0543 EDT LORazepam (ATIVAN) injection 1 mg 1 mg Intravenous Given     07/19/2023 0919 EDT cefTRIAXone (ROCEPHIN) IVPB (premix in dextrose) 1,000 mg 50 mL 1,000 mg Intravenous New Bag     07/19/2023 1416 EDT methylPREDNISolone sodium succinate (Solu-MEDROL) injection 60 mg 60 mg Intravenous Given     07/19/2023 1418 EDT diphenhydrAMINE (BENADRYL) 50 mg in sodium chloride 0.9 % 50 mL IVPB 50 mg Intravenous New Bag        Past Medical History:   Diagnosis Date   • Anemia 2007   • Anxiety    • Asthma    • Bile duct stricture 2014    Sphincter of oddi dysfunction   • Chronic kidney disease    • Colon polyp 1998   • Crohn's colitis ([address])    • Deep vein thrombosis ([address])    • Disease of thyroid gland    • 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 female    Scheduled Medications:    cefTRIAXone, 1,000 mg, Intravenous, Q24H  diphenhydrAMINE, 50 mg, Intravenous, Q12H  famotidine, 40 mg, Oral, Daily  liothyronine, 5 mcg, Oral, Daily  loratadine, 10 mg, Oral, Daily  methylPREDNISolone sodium succinate, 40 mg, Intravenous, Q8H KARISSA  montelukast, 10 mg, Oral, Daily  pantoprazole, 40 mg, Oral, BID AC      Continuous IV Infusions:  sodium chloride, 100 mL/hr, Intravenous, Continuous      PRN Meds:  albuterol, 1 puff, Inhalation, Q4H PRN  butalbital-acetaminophen-caffeine, 1 tablet, Oral, Q8H PRN  HYDROmorphone, 0.5 mg, Intravenous, Q4H PRN  ondansetron, 4 mg, Intravenous, Q6H PRN  traMADol, 50 mg, Oral, Q12H PRN        IP CONSULT TO NEUROLOGY    Network Utilization Review Department  ATTENTION: Please call with any questions or concerns to [phone] and carefully listen to the prompts so that you are directed to the right person. All voicemails are confidential.  Theta Maggie all requests for admission clinical reviews, approved or denied determinations and any other requests to dedicated fax number below belonging to the campus where the patient is receiving treatment.  List of dedicated fax numbers for the Facilities:  [place] DENIALS (Administrative/Medical Necessity) [phone] [address] (Maternity/NICU/Pediatrics) [phone]   [address] [place] [address] Drive [phone]   [facility] [address] [facility] [phone]   [facility] [id number] [place] [address] [phone]   [address] [facility] [address] [facility]  [address] [phone]

## 2023-07-19 NOTE — ED NOTES
Patient is verbally communicating with  that she wants pain medications, Ativan, benadryl, and to be straight cath for urine. When I assessed patient she will not verbally talk with me. She continuously shakes in bed. Patient will not open eyes or answer my questions. Patient did open eyes when I performed a sternal rub.  states that we are not doing anything for her and wants her evaluated with neurology. Advised neurology is not on staff here at night.  states that he wants her transferred out. I had a bladder scan performed on patient and it showed over 900 mL. Straight Cath performed with a return of 600 mL. Advised that patient has oral medications ordered.  states that she cannot swallow medications. Notified attending provider.      Trinity Dominguez RN  07/19/23 3258

## 2023-07-19 NOTE — ED NOTES
Note delayed d/t pt care. Around 0240 pt rang call bell,  whom remains at bedside requesting IV ativan and pain meds for pt. This RN explained pt has tramadol available. Pt declined pain med available and stated "I can't pee I'm in pain." Pt  then became extremely agitated and screamed "I cant take it anymore somebody do something!" Pt bladder scanned 47ml, Yudy Rodriguez RN at bedside with this RN for bladder scan. Dr. Nila Ferguson made aware of situation via tiger text. Pt's vital signs remained stable.

## 2023-07-20 ENCOUNTER — APPOINTMENT (INPATIENT)
Dept: ULTRASOUND IMAGING | Facility: HOSPITAL | Age: 48
DRG: 815 | End: 2023-07-20
Payer: COMMERCIAL

## 2023-07-20 LAB
ATRIAL RATE: 90 BPM
P AXIS: 35 DEGREES
PR INTERVAL: 116 MS
QRS AXIS: 77 DEGREES
QRSD INTERVAL: 78 MS
QT INTERVAL: 372 MS
QTC INTERVAL: 455 MS
T WAVE AXIS: 64 DEGREES
VENTRICULAR RATE: 90 BPM

## 2023-07-20 PROCEDURE — 76705 ECHO EXAM OF ABDOMEN: CPT

## 2023-07-20 PROCEDURE — 93010 ELECTROCARDIOGRAM REPORT: CPT | Performed by: INTERNAL MEDICINE

## 2023-07-20 PROCEDURE — 99232 SBSQ HOSP IP/OBS MODERATE 35: CPT | Performed by: PHYSICIAN ASSISTANT

## 2023-07-20 RX ORDER — LORAZEPAM 2 MG/ML
INJECTION INTRAMUSCULAR
Status: COMPLETED
Start: 2023-07-20 | End: 2023-07-20

## 2023-07-20 RX ORDER — LORAZEPAM 2 MG/ML
1 INJECTION INTRAMUSCULAR EVERY 6 HOURS PRN
Status: DISCONTINUED | OUTPATIENT
Start: 2023-07-20 | End: 2023-07-22

## 2023-07-20 RX ADMIN — DIPHENHYDRAMINE HYDROCHLORIDE 50 MG: 50 INJECTION, SOLUTION INTRAMUSCULAR; INTRAVENOUS at 17:57

## 2023-07-20 RX ADMIN — CEFTRIAXONE 1000 MG: 1 INJECTION, SOLUTION INTRAVENOUS at 09:17

## 2023-07-20 RX ADMIN — METHYLPREDNISOLONE SODIUM SUCCINATE 40 MG: 40 INJECTION, POWDER, FOR SOLUTION INTRAMUSCULAR; INTRAVENOUS at 13:22

## 2023-07-20 RX ADMIN — HYDROMORPHONE HYDROCHLORIDE 0.5 MG: 1 INJECTION, SOLUTION INTRAMUSCULAR; INTRAVENOUS; SUBCUTANEOUS at 13:22

## 2023-07-20 RX ADMIN — METHYLPREDNISOLONE SODIUM SUCCINATE 40 MG: 40 INJECTION, POWDER, FOR SOLUTION INTRAMUSCULAR; INTRAVENOUS at 05:59

## 2023-07-20 RX ADMIN — DIPHENHYDRAMINE HYDROCHLORIDE 50 MG: 50 INJECTION, SOLUTION INTRAMUSCULAR; INTRAVENOUS at 05:59

## 2023-07-20 RX ADMIN — SODIUM CHLORIDE 100 ML/HR: 0.9 INJECTION, SOLUTION INTRAVENOUS at 20:50

## 2023-07-20 RX ADMIN — LORAZEPAM 1 MG: 2 INJECTION INTRAMUSCULAR; INTRAVENOUS at 13:52

## 2023-07-20 RX ADMIN — METHYLPREDNISOLONE SODIUM SUCCINATE 40 MG: 40 INJECTION, POWDER, FOR SOLUTION INTRAMUSCULAR; INTRAVENOUS at 21:16

## 2023-07-20 RX ADMIN — PANTOPRAZOLE SODIUM 40 MG: 40 TABLET, DELAYED RELEASE ORAL at 07:17

## 2023-07-20 RX ADMIN — SODIUM CHLORIDE 100 ML/HR: 0.9 INJECTION, SOLUTION INTRAVENOUS at 11:14

## 2023-07-20 NOTE — PLAN OF CARE
Problem: PAIN - ADULT  Goal: Verbalizes/displays adequate comfort level or baseline comfort level  Description: Interventions:  - Encourage patient to monitor pain and request assistance  - Assess pain using appropriate pain scale  - Administer analgesics based on type and severity of pain and evaluate response  - Implement non-pharmacological measures as appropriate and evaluate response  - Consider cultural and social influences on pain and pain management  - Notify physician/advanced practitioner if interventions unsuccessful or patient reports new pain  Outcome: Progressing     Problem: INFECTION - ADULT  Goal: Absence or prevention of progression during hospitalization  Description: INTERVENTIONS:  - Assess and monitor for signs and symptoms of infection  - Monitor lab/diagnostic results  - Monitor all insertion sites, i.e. indwelling lines, tubes, and drains  - Monitor endotracheal if appropriate and nasal secretions for changes in amount and color  - [place] appropriate cooling/warming therapies per order  - Administer medications as ordered  - Instruct and encourage patient and family to use good hand hygiene technique  - Identify and instruct in appropriate isolation precautions for identified infection/condition  Outcome: Progressing  Goal: Absence of fever/infection during neutropenic period  Description: INTERVENTIONS:  - Monitor WBC    Outcome: Progressing     Problem: Knowledge Deficit  Goal: Patient/family/caregiver demonstrates understanding of disease process, treatment plan, medications, and discharge instructions  Description: Complete learning assessment and assess knowledge base.   Interventions:  - Provide teaching at level of understanding  - Provide teaching via preferred learning methods  Outcome: Progressing

## 2023-07-20 NOTE — ASSESSMENT & PLAN NOTE
· History of Crohn's  · Continue outpatient follow-up    Currently complaining of RUQ abdominal pain, separate from her usual crohns pain, obtain RUQ abdominal US

## 2023-07-20 NOTE — ED NOTES
Patient sleeping.  Bed in lowest position and call bell in reach     NAYE COCHRAN JR. [facility], RN  07/20/23 2158

## 2023-07-20 NOTE — NURSING NOTE
Patient had an episode of uncontrollable coughing. She stated" there is food stuck in my throat" and was attempting the dig it out with her fingers. She then proceeded to have an episode of staring and rapid blinking. PA was notified, Ativan ordered and given. Patient now currently sleeping.

## 2023-07-20 NOTE — ASSESSMENT & PLAN NOTE
· History of mast cell activation syndrome involved with mast cell activation specialist at [facility] patient reports had a "mast cell activation attack"  states patient became unresponsive extremities locked up for approximately 3 to 5 minutes reports this is how her syndrome typically presents she reports he typically gets Benadryl steroids and IV fluids  · Currently at baseline  · Mental status at baseline   · Continue with IV steroids and IV benadryl

## 2023-07-20 NOTE — PROGRESS NOTES
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[address])  Assessment & Plan  · History of mast cell activation syndrome involved with mast cell activation specialist at [facility] patient reports had a "mast cell activation attack"  states patient became unresponsive extremities locked up for approximately 3 to 5 minutes reports this is how her syndrome typically presents she reports he typically gets Benadryl steroids and IV fluids  · Currently at baseline  · Mental status at baseline   · Continue with IV steroids and IV benadryl    Acute encephalopathy  Assessment & Plan  · Has a history of psychogenic seizures  · Appears to have had a tonic-clonic true seizure at home per  given elevated lactic acid suspect did have true seizure  · Obtain MRI brain without contrast - unremarkable  · Appreciate neuro consult  · Had another event today. Per neuro, if these continue would warrant transfer   · Obtain EEG here    Bladder tumor  Assessment & Plan  · Follows outpatient with urology  · Continue with urinary retention protocol    Hypothyroidism (acquired)  Assessment & Plan  · Continue Synthroid             VTE Pharmacologic Prophylaxis: VTE Score: 2 Low Risk (Score 0-2) - Encourage Ambulation. Patient Centered Rounds: I performed bedside rounds with nursing staff today. Discussions with Specialists or Other Care Team Provider: neurology, case management    Education and Discussions with Family / Patient: Updated  () at bedside.     Total Time Spent on Date of Encounter in care of patient: 39 minutes This time was spent on one or more of the following: performing physical exam; counseling and coordination of care; obtaining or reviewing history; documenting in the medical record; reviewing/ordering tests, medications or procedures; communicating with other healthcare professionals and discussing with patient's family/caregivers. Current Length of Stay: 0 day(s)  Current Patient Status: Inpatient   Certification Statement: The patient will continue to require additional inpatient hospital stay due to seizure like activity, IV abx  Discharge Plan: Anticipate discharge in 48 hrs to home. Code Status: Level 1 - Full Code    Subjective:   Patient reports still feeling unwell. States she thinks she is still retaining urine at this time. Objective:     Vitals:   Temp (24hrs), Av.7 °F (36.5 °C), Min:97.3 °F (36.3 °C), Max:98 °F (36.7 °C)    Temp:  [97.3 °F (36.3 °C)-98 °F (36.7 °C)] 97.7 °F (36.5 °C)  HR:  [] 82  Resp:  [16-18] 18  BP: (103-137)/(63-84) 103/71  SpO2:  [94 %-97 %] 94 %  Body mass index is 26.23 kg/m². Input and Output Summary (last 24 hours): Intake/Output Summary (Last 24 hours) at 2023 1646  Last data filed at 2023 1501  Gross per 24 hour   Intake 428.33 ml   Output 800 ml   Net -371.67 ml       Physical Exam:   Physical Exam  Vitals and nursing note reviewed. Constitutional:       General: She is not in acute distress. Cardiovascular:      Rate and Rhythm: Normal rate and regular rhythm. Pulses: Normal pulses. Heart sounds: Normal heart sounds. Pulmonary:      Effort: Pulmonary effort is normal.      Breath sounds: Normal breath sounds. Abdominal:      General: Bowel sounds are normal.      Palpations: Abdomen is soft. Tenderness: There is abdominal tenderness (RUQ). Musculoskeletal:      Right lower leg: No edema. Left lower leg: No edema. Skin:     General: Skin is warm and dry.       Comments: Small purpuric rash on left forearm   Neurological:      General: No focal deficit present. Mental Status: She is alert. Mental status is at baseline. Psychiatric:         Mood and Affect: Mood normal.         Behavior: Behavior normal.          Additional Data:     Labs:  Results from last 7 days   Lab Units 07/18/23  1740   WBC Thousand/uL 10.80*   HEMOGLOBIN g/dL 13.8   HEMATOCRIT % 41.3   PLATELETS Thousands/uL 667*   NEUTROS PCT % 80*   LYMPHS PCT % 13*   MONOS PCT % 5   EOS PCT % 0     Results from last 7 days   Lab Units 07/18/23  1740   SODIUM mmol/L 141   POTASSIUM mmol/L 4.8   CHLORIDE mmol/L 102   CO2 mmol/L 25   BUN mg/dL 6   CREATININE mg/dL 0.66   ANION GAP mmol/L 14   CALCIUM mg/dL 9.5   ALBUMIN g/dL 4.2   TOTAL BILIRUBIN mg/dL 0.43   ALK PHOS U/L 57   ALT U/L 31   AST U/L 29   GLUCOSE RANDOM mg/dL 155*                 Results from last 7 days   Lab Units 07/18/23  2044 07/18/23  1740   LACTIC ACID mmol/L 1.6 5.1*       Lines/Drains:  Invasive Devices     Peripheral Intravenous Line  Duration           Peripheral IV 07/18/23 Distal;Right;Ventral (anterior) Forearm 1 day                      Imaging: No pertinent imaging reviewed.     Recent Cultures (last 7 days):         Last 24 Hours Medication List:   Current Facility-Administered Medications   Medication Dose Route Frequency Provider Last Rate   • albuterol  1 puff Inhalation Q4H PRN Fede Mccauley MD     • butalbital-acetaminophen-caffeine  1 tablet Oral Q8H PRN Fede Mccauley MD     • cefTRIAXone  1,000 mg Intravenous Q24H Jae Peñaloza MD 1,000 mg (07/20/23 0917)   • diphenhydrAMINE  50 mg Intravenous Q12H Fede Mccauley MD     • famotidine  40 mg Oral Daily Fede Mccauley MD     • HYDROmorphone  0.5 mg Intravenous Q4H PRN Fede Mccauley MD     • liothyronine  5 mcg Oral Daily Fede Mccauley MD     • loratadine  10 mg Oral Daily Fede Mccauley MD     • LORazepam  1 mg Intravenous Q6H PRN Codie Nichols PA-C     • methylPREDNISolone sodium succinate  40 mg Intravenous Q8H [address] Kamlesh Gonzales MD     • montelukast  10 mg Oral Daily Fede Mccauley MD     • ondansetron  4 mg Intravenous Q6H PRN Fede Mccauley MD     • pantoprazole  40 mg Oral BID AC Fede Mccauley MD     • sodium chloride  100 mL/hr Intravenous Continuous Fede Mccauley  mL/hr (07/20/23 1114)        Today, Patient Was Seen By: Yaya Odell PA-C    **Please Note: This note may have been constructed using a voice recognition system. **

## 2023-07-20 NOTE — ASSESSMENT & PLAN NOTE
· Has a history of psychogenic seizures  · Appears to have had a tonic-clonic true seizure at home per  given elevated lactic acid suspect did have true seizure  · Obtain MRI brain without contrast - unremarkable  · Appreciate neuro consult  · Had another event today.  Per neuro, if these continue would warrant transfer   · Obtain EEG here

## 2023-07-21 ENCOUNTER — APPOINTMENT (INPATIENT)
Dept: MRI IMAGING | Facility: HOSPITAL | Age: 48
DRG: 815 | End: 2023-07-21
Payer: COMMERCIAL

## 2023-07-21 ENCOUNTER — APPOINTMENT (INPATIENT)
Dept: NEUROLOGY | Facility: HOSPITAL | Age: 48
DRG: 815 | End: 2023-07-21
Payer: COMMERCIAL

## 2023-07-21 LAB
ALBUMIN SERPL BCP-MCNC: 3.9 G/DL (ref 3.5–5)
ALP SERPL-CCNC: 50 U/L (ref 34–104)
ALT SERPL W P-5'-P-CCNC: 21 U/L (ref 7–52)
ANION GAP SERPL CALCULATED.3IONS-SCNC: 9 MMOL/L
AST SERPL W P-5'-P-CCNC: 15 U/L (ref 13–39)
BILIRUB SERPL-MCNC: 0.3 MG/DL (ref 0.2–1)
BUN SERPL-MCNC: 9 MG/DL (ref 5–25)
CALCIUM SERPL-MCNC: 9 MG/DL (ref 8.4–10.2)
CHLORIDE SERPL-SCNC: 102 MMOL/L (ref 96–108)
CO2 SERPL-SCNC: 28 MMOL/L (ref 21–32)
CREAT SERPL-MCNC: 0.62 MG/DL (ref 0.6–1.3)
ERYTHROCYTE [DISTWIDTH] IN BLOOD BY AUTOMATED COUNT: 15 % (ref 11.6–15.1)
GFR SERPL CREATININE-BSD FRML MDRD: 105 ML/MIN/1.73SQ M
GLUCOSE SERPL-MCNC: 132 MG/DL (ref 65–140)
HCT VFR BLD AUTO: 42.9 % (ref 34.8–46.1)
HGB BLD-MCNC: 13.9 G/DL (ref 11.5–15.4)
MCH RBC QN AUTO: 30.2 PG (ref 26.8–34.3)
MCHC RBC AUTO-ENTMCNC: 32.4 G/DL (ref 31.4–37.4)
MCV RBC AUTO: 93 FL (ref 82–98)
PLATELET # BLD AUTO: 668 THOUSANDS/UL (ref 149–390)
PMV BLD AUTO: 8.5 FL (ref 8.9–12.7)
POTASSIUM SERPL-SCNC: 4 MMOL/L (ref 3.5–5.3)
PROT SERPL-MCNC: 6.7 G/DL (ref 6.4–8.4)
RBC # BLD AUTO: 4.6 MILLION/UL (ref 3.81–5.12)
SODIUM SERPL-SCNC: 139 MMOL/L (ref 135–147)
T4 FREE SERPL-MCNC: 0.81 NG/DL (ref 0.61–1.12)
TSH SERPL DL<=0.05 MIU/L-ACNC: 0.34 UIU/ML (ref 0.45–4.5)
WBC # BLD AUTO: 15.79 THOUSAND/UL (ref 4.31–10.16)

## 2023-07-21 PROCEDURE — 84439 ASSAY OF FREE THYROXINE: CPT | Performed by: PHYSICIAN ASSISTANT

## 2023-07-21 PROCEDURE — 80053 COMPREHEN METABOLIC PANEL: CPT | Performed by: PHYSICIAN ASSISTANT

## 2023-07-21 PROCEDURE — 95816 EEG AWAKE AND DROWSY: CPT

## 2023-07-21 PROCEDURE — 99232 SBSQ HOSP IP/OBS MODERATE 35: CPT | Performed by: PHYSICIAN ASSISTANT

## 2023-07-21 PROCEDURE — 74181 MRI ABDOMEN W/O CONTRAST: CPT

## 2023-07-21 PROCEDURE — 85027 COMPLETE CBC AUTOMATED: CPT | Performed by: PHYSICIAN ASSISTANT

## 2023-07-21 PROCEDURE — 95816 EEG AWAKE AND DROWSY: CPT | Performed by: STUDENT IN AN ORGANIZED HEALTH CARE EDUCATION/TRAINING PROGRAM

## 2023-07-21 PROCEDURE — 4A10X4Z MONITORING OF CENTRAL NERVOUS ELECTRICAL ACTIVITY, EXTERNAL APPROACH: ICD-10-PCS | Performed by: PSYCHIATRY & NEUROLOGY

## 2023-07-21 PROCEDURE — 84443 ASSAY THYROID STIM HORMONE: CPT | Performed by: PHYSICIAN ASSISTANT

## 2023-07-21 RX ORDER — HYDROMORPHONE HCL/PF 1 MG/ML
1 SYRINGE (ML) INJECTION ONCE
Status: DISCONTINUED | OUTPATIENT
Start: 2023-07-21 | End: 2023-07-22

## 2023-07-21 RX ORDER — HYDROMORPHONE HCL IN WATER/PF 6 MG/30 ML
0.2 PATIENT CONTROLLED ANALGESIA SYRINGE INTRAVENOUS EVERY 4 HOURS PRN
Status: DISCONTINUED | OUTPATIENT
Start: 2023-07-21 | End: 2023-07-22

## 2023-07-21 RX ADMIN — SODIUM CHLORIDE 100 ML/HR: 0.9 INJECTION, SOLUTION INTRAVENOUS at 22:28

## 2023-07-21 RX ADMIN — SODIUM CHLORIDE 100 ML/HR: 0.9 INJECTION, SOLUTION INTRAVENOUS at 05:53

## 2023-07-21 RX ADMIN — DIPHENHYDRAMINE HYDROCHLORIDE 50 MG: 50 INJECTION, SOLUTION INTRAMUSCULAR; INTRAVENOUS at 05:32

## 2023-07-21 RX ADMIN — LORAZEPAM 1 MG: 2 INJECTION INTRAMUSCULAR; INTRAVENOUS at 10:09

## 2023-07-21 RX ADMIN — METHYLPREDNISOLONE SODIUM SUCCINATE 40 MG: 40 INJECTION, POWDER, FOR SOLUTION INTRAMUSCULAR; INTRAVENOUS at 14:00

## 2023-07-21 RX ADMIN — METHYLPREDNISOLONE SODIUM SUCCINATE 40 MG: 40 INJECTION, POWDER, FOR SOLUTION INTRAMUSCULAR; INTRAVENOUS at 05:32

## 2023-07-21 RX ADMIN — CEFTRIAXONE 1000 MG: 1 INJECTION, SOLUTION INTRAVENOUS at 08:16

## 2023-07-21 RX ADMIN — METHYLPREDNISOLONE SODIUM SUCCINATE 40 MG: 40 INJECTION, POWDER, FOR SOLUTION INTRAMUSCULAR; INTRAVENOUS at 22:27

## 2023-07-21 RX ADMIN — HYDROMORPHONE HYDROCHLORIDE 0.5 MG: 1 INJECTION, SOLUTION INTRAMUSCULAR; INTRAVENOUS; SUBCUTANEOUS at 05:32

## 2023-07-21 RX ADMIN — DIPHENHYDRAMINE HYDROCHLORIDE 50 MG: 50 INJECTION, SOLUTION INTRAMUSCULAR; INTRAVENOUS at 16:30

## 2023-07-21 RX ADMIN — HYDROMORPHONE HYDROCHLORIDE 0.2 MG: 0.2 INJECTION, SOLUTION INTRAMUSCULAR; INTRAVENOUS; SUBCUTANEOUS at 08:16

## 2023-07-21 RX ADMIN — HYDROMORPHONE HYDROCHLORIDE 0.5 MG: 1 INJECTION, SOLUTION INTRAMUSCULAR; INTRAVENOUS; SUBCUTANEOUS at 14:05

## 2023-07-21 NOTE — ASSESSMENT & PLAN NOTE
· History of mast cell activation syndrome involved with mast cell activation specialist at [facility] patient reports had a "mast cell activation attack"  states patient became unresponsive extremities locked up for approximately 3 to 5 minutes reports this is how her syndrome typically presents she reports he typically gets Benadryl steroids and IV fluids  · Currently at baseline  · Mental status at baseline   · Continue with IV steroids and IV benadryl

## 2023-07-21 NOTE — PLAN OF CARE

## 2023-07-21 NOTE — PLAN OF CARE
Problem: PAIN - ADULT  Goal: Verbalizes/displays adequate comfort level or baseline comfort level  Description: Interventions:  - Encourage patient to monitor pain and request assistance  - Assess pain using appropriate pain scale  - Administer analgesics based on type and severity of pain and evaluate response  - Implement non-pharmacological measures as appropriate and evaluate response  - Consider cultural and social influences on pain and pain management  - Notify physician/advanced practitioner if interventions unsuccessful or patient reports new pain  Outcome: Progressing     Problem: INFECTION - ADULT  Goal: Absence or prevention of progression during hospitalization  Description: INTERVENTIONS:  - Assess and monitor for signs and symptoms of infection  - Monitor lab/diagnostic results  - Monitor all insertion sites, i.e. indwelling lines, tubes, and drains  - Monitor endotracheal if appropriate and nasal secretions for changes in amount and color  - [place] appropriate cooling/warming therapies per order  - Administer medications as ordered  - Instruct and encourage patient and family to use good hand hygiene technique  - Identify and instruct in appropriate isolation precautions for identified infection/condition  Outcome: Progressing  Goal: Absence of fever/infection during neutropenic period  Description: INTERVENTIONS:  - Monitor WBC    Outcome: Progressing     Problem: Knowledge Deficit  Goal: Patient/family/caregiver demonstrates understanding of disease process, treatment plan, medications, and discharge instructions  Description: Complete learning assessment and assess knowledge base.   Interventions:  - Provide teaching at level of understanding  - Provide teaching via preferred learning methods  Outcome: Progressing

## 2023-07-21 NOTE — PROGRESS NOTES
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 patient reports had a "mast cell activation attack"  states patient became unresponsive extremities locked up for approximately 3 to 5 minutes reports this is how her syndrome typically presents she reports he typically gets Benadryl steroids and IV fluids  · Currently at baseline  · Mental status at baseline   · Continue with IV steroids and IV benadryl    Acute encephalopathy  Assessment & Plan  · Has a history of psychogenic seizures  · Appears to have had a tonic-clonic true seizure at home per  given elevated lactic acid suspect did have true seizure  · Obtain MRI brain without contrast - unremarkable  · Appreciate neuro consult  · Had another event today. Per neuro, if these continue would warrant transfer   · Obtain EEG here - pending    Bladder tumor  Assessment & Plan  · Follows outpatient with urology  · Continue with urinary retention protocol    Hypothyroidism (acquired)  Assessment & Plan  · Continue Synthroid  · Check TSH level with increasing levels of anxiety, hair loss             VTE Pharmacologic Prophylaxis: VTE Score: 2 Low Risk (Score 0-2) - Encourage Ambulation. Patient Centered Rounds: I performed bedside rounds with nursing staff today.   Discussions with Specialists or Other Care Team Provider: case managment    Education and Discussions with Family / Patient: Patient declined call to . Total Time Spent on Date of Encounter in care of patient: 45 minutes This time was spent on one or more of the following: performing physical exam; counseling and coordination of care; obtaining or reviewing history; documenting in the medical record; reviewing/ordering tests, medications or procedures; communicating with other healthcare professionals and discussing with patient's family/caregivers. Current Length of Stay: 1 day(s)  Current Patient Status: Inpatient   Certification Statement: The patient will continue to require additional inpatient hospital stay due to pending EEG, IV abx  Discharge Plan: Anticipate discharge tomorrow to home. Code Status: Level 1 - Full Code    Subjective:   Patient tearful in bed, very anxious. Continues to have abdominal pain    Objective:     Vitals:   Temp (24hrs), Av.8 °F (36.6 °C), Min:97.7 °F (36.5 °C), Max:97.9 °F (36.6 °C)    Temp:  [97.7 °F (36.5 °C)-97.9 °F (36.6 °C)] 97.9 °F (36.6 °C)  HR:  [68-82] 68  Resp:  [18] 18  BP: (103-126)/(71-73) 126/73  SpO2:  [94 %] 94 %  Body mass index is 26.23 kg/m². Input and Output Summary (last 24 hours): Intake/Output Summary (Last 24 hours) at 2023 1447  Last data filed at 2023 1444  Gross per 24 hour   Intake 498.33 ml   Output 1800 ml   Net -1301.67 ml       Physical Exam:   Physical Exam  Vitals and nursing note reviewed. Cardiovascular:      Rate and Rhythm: Normal rate and regular rhythm. Pulses: Normal pulses. Heart sounds: Normal heart sounds. Pulmonary:      Effort: Pulmonary effort is normal.      Breath sounds: Normal breath sounds. Abdominal:      General: Bowel sounds are normal.      Palpations: Abdomen is soft. Tenderness: There is abdominal tenderness. Musculoskeletal:      Right lower leg: No edema. Left lower leg: No edema. Skin:     General: Skin is warm and dry.    Neurological:      General: No focal deficit present. Mental Status: She is alert. Mental status is at baseline.    Psychiatric:      Comments: Very tearful and anxious          Additional Data:     Labs:  Results from last 7 days   Lab Units 07/21/23  1204 07/18/23  1740   WBC Thousand/uL 15.79* 10.80*   HEMOGLOBIN g/dL 13.9 13.8   HEMATOCRIT % 42.9 41.3   PLATELETS Thousands/uL 668* 667*   NEUTROS PCT %  --  80*   LYMPHS PCT %  --  13*   MONOS PCT %  --  5   EOS PCT %  --  0     Results from last 7 days   Lab Units 07/21/23  1204   SODIUM mmol/L 139   POTASSIUM mmol/L 4.0   CHLORIDE mmol/L 102   CO2 mmol/L 28   BUN mg/dL 9   CREATININE mg/dL 0.62   ANION GAP mmol/L 9   CALCIUM mg/dL 9.0   ALBUMIN g/dL 3.9   TOTAL BILIRUBIN mg/dL 0.30   ALK PHOS U/L 50   ALT U/L 21   AST U/L 15   GLUCOSE RANDOM mg/dL 132                 Results from last 7 days   Lab Units 07/18/23  2044 07/18/23  1740   LACTIC ACID mmol/L 1.6 5.1*       Lines/Drains:  Invasive Devices     Peripheral Intravenous Line  Duration           Peripheral IV 07/21/23 Left Antecubital <1 day                      Imaging: Reviewed radiology reports from this admission including: MRI abdomen/MRCP    Recent Cultures (last 7 days):         Last 24 Hours Medication List:   Current Facility-Administered Medications   Medication Dose Route Frequency Provider Last Rate   • albuterol  1 puff Inhalation Q4H PRN Fede Mccauley MD     • butalbital-acetaminophen-caffeine  1 tablet Oral Q8H PRN Fede Mccauley MD     • cefTRIAXone  1,000 mg Intravenous Q24H Jae Albarado MD 1,000 mg (07/21/23 0816)   • diphenhydrAMINE  50 mg Intravenous Q12H Fede Mccauley MD     • famotidine  40 mg Oral Daily Fede Mccauley MD     • HYDROmorphone  0.5 mg Intravenous Q4H PRN Fede Mccauley MD     • HYDROmorphone  0.2 mg Intravenous Q4H PRN Joann Fernandez PA-C     • liothyronine  5 mcg Oral Daily Fede Mccauley MD     • loratadine  10 mg Oral Daily Fede Mccauley MD     • LORazepam  1 mg Intravenous Q6H PRN Rexine Rebecca, RANDEE     • methylPREDNISolone sodium succinate  40 mg Intravenous Q8H [facility] Fede Mccauley MD     • montelukast  10 mg Oral Daily Fede Mccauley MD     • ondansetron  4 mg Intravenous Q6H PRN Fede Mccauley MD     • pantoprazole  40 mg Oral BID AC Fede Mccauley MD     • sodium chloride  100 mL/hr Intravenous Continuous Fede Mccauley  mL/hr (07/21/23 4783)        Today, Patient Was Seen By: Gio Vargas PA-C    **Please Note: This note may have been constructed using a voice recognition system. **

## 2023-07-21 NOTE — ASSESSMENT & PLAN NOTE
· Has a history of psychogenic seizures  · Appears to have had a tonic-clonic true seizure at home per  given elevated lactic acid suspect did have true seizure  · Obtain MRI brain without contrast - unremarkable  · Appreciate neuro consult  · Had another event today.  Per neuro, if these continue would warrant transfer   · Obtain EEG here - pending

## 2023-07-21 NOTE — NURSING NOTE
Called to patient's room by EEG tech. Patient found to be hyperventilating, unable to speak, frantic, and turning side to side in her bed. Ativan given at this time. Symptoms resolved and patient returned to baseline mentation/presentation.

## 2023-07-21 NOTE — ASSESSMENT & PLAN NOTE
· History of Crohn's  · Continue outpatient follow-up    Currently complaining of RUQ abdominal pain, separate from her usual crohns pain, obtain RUQ abdominal US - dilated CBD noted, MRCP completed no choledocholithiasis noted

## 2023-07-22 PROBLEM — R45.851 PASSIVE SUICIDAL IDEATIONS: Status: ACTIVE | Noted: 2023-07-22

## 2023-07-22 PROCEDURE — G0427 INPT/ED TELECONSULT70: HCPCS | Performed by: STUDENT IN AN ORGANIZED HEALTH CARE EDUCATION/TRAINING PROGRAM

## 2023-07-22 PROCEDURE — 99232 SBSQ HOSP IP/OBS MODERATE 35: CPT

## 2023-07-22 RX ORDER — LORAZEPAM 2 MG/ML
1 INJECTION INTRAMUSCULAR EVERY 6 HOURS PRN
Status: DISCONTINUED | OUTPATIENT
Start: 2023-07-22 | End: 2023-07-24 | Stop reason: HOSPADM

## 2023-07-22 RX ORDER — OXYCODONE HYDROCHLORIDE 5 MG/1
5 TABLET ORAL EVERY 4 HOURS PRN
Status: DISCONTINUED | OUTPATIENT
Start: 2023-07-22 | End: 2023-07-24 | Stop reason: HOSPADM

## 2023-07-22 RX ORDER — FAMOTIDINE 40 MG/1
40 TABLET, FILM COATED ORAL DAILY
Status: DISCONTINUED | OUTPATIENT
Start: 2023-07-22 | End: 2023-07-24 | Stop reason: HOSPADM

## 2023-07-22 RX ORDER — DIPHENHYDRAMINE HCL 25 MG
25 TABLET ORAL EVERY 6 HOURS
Status: DISCONTINUED | OUTPATIENT
Start: 2023-07-22 | End: 2023-07-24 | Stop reason: HOSPADM

## 2023-07-22 RX ORDER — PREDNISONE 20 MG/1
20 TABLET ORAL 2 TIMES DAILY WITH MEALS
Status: DISCONTINUED | OUTPATIENT
Start: 2023-07-22 | End: 2023-07-24 | Stop reason: HOSPADM

## 2023-07-22 RX ORDER — BISACODYL 5 MG/1
5 TABLET, DELAYED RELEASE ORAL DAILY PRN
Status: DISCONTINUED | OUTPATIENT
Start: 2023-07-22 | End: 2023-07-24 | Stop reason: HOSPADM

## 2023-07-22 RX ORDER — LORAZEPAM 1 MG/1
1 TABLET ORAL ONCE
Status: COMPLETED | OUTPATIENT
Start: 2023-07-22 | End: 2023-07-22

## 2023-07-22 RX ORDER — FAMOTIDINE 20 MG/1
40 TABLET, FILM COATED ORAL DAILY
Status: DISCONTINUED | OUTPATIENT
Start: 2023-07-22 | End: 2023-07-22

## 2023-07-22 RX ORDER — LORAZEPAM 2 MG/ML
1 INJECTION INTRAMUSCULAR EVERY 6 HOURS PRN
Status: DISCONTINUED | OUTPATIENT
Start: 2023-07-22 | End: 2023-07-22

## 2023-07-22 RX ADMIN — CEFTRIAXONE 1000 MG: 1 INJECTION, SOLUTION INTRAVENOUS at 09:57

## 2023-07-22 RX ADMIN — HYDROMORPHONE HYDROCHLORIDE 0.5 MG: 1 INJECTION, SOLUTION INTRAMUSCULAR; INTRAVENOUS; SUBCUTANEOUS at 01:40

## 2023-07-22 RX ADMIN — SODIUM CHLORIDE 100 ML/HR: 0.9 INJECTION, SOLUTION INTRAVENOUS at 10:03

## 2023-07-22 RX ADMIN — DIPHENHYDRAMINE HYDROCHLORIDE 50 MG: 50 INJECTION, SOLUTION INTRAMUSCULAR; INTRAVENOUS at 06:00

## 2023-07-22 RX ADMIN — LORAZEPAM 1 MG: 1 TABLET ORAL at 22:11

## 2023-07-22 RX ADMIN — FAMOTIDINE 40 MG: 40 TABLET ORAL at 11:43

## 2023-07-22 RX ADMIN — PANTOPRAZOLE SODIUM 40 MG: 40 TABLET, DELAYED RELEASE ORAL at 06:05

## 2023-07-22 RX ADMIN — METHYLPREDNISOLONE SODIUM SUCCINATE 40 MG: 40 INJECTION, POWDER, FOR SOLUTION INTRAMUSCULAR; INTRAVENOUS at 06:05

## 2023-07-22 RX ADMIN — HYDROMORPHONE HYDROCHLORIDE 0.5 MG: 1 INJECTION, SOLUTION INTRAMUSCULAR; INTRAVENOUS; SUBCUTANEOUS at 08:01

## 2023-07-22 NOTE — ASSESSMENT & PLAN NOTE
· Has a history of psychogenic seizures. Had 1 additional episode while hospitalized.   · Appears to have had a tonic-clonic true seizure at home per  given elevated lactic acid suspect she did have true seizure -  likely given abrupt benzodiazepine withdrawal based on our conversation at bedside today, pt revealing she has been misusing benzos for 2-3 weeks prior to admission  · Obtain MRI brain without contrast - unremarkable  · EEG normal  · Seizure precautions
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
## 2023-07-22 NOTE — TELEMEDICINE
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 intracranial hemorrhage. There is no evidence of acute infarction and diffusion imaging is unremarkable. There are no white matter changes in the cerebral hemispheres. Symmetric hippocampal formations with regard to size and signal. The hippocampal volumes are preserved with incidental bilateral sulcus remnant cysts (series 6: 14). VENTRICLES:  Normal for the patient's age. SELLA AND PITUITARY GLAND:  Normal. ORBITS:  Normal. PARANASAL SINUSES: Left maxillary sinus retention cyst. Mild ethmoidal mucosal thickening. The remainder of the visualized paranasal sinus cavities and mastoid air cells are clear. VASCULATURE:  Evaluation of the major intracranial vasculature demonstrates appropriate flow voids. CALVARIUM AND SKULL BASE: Marrow signal heterogeneity which can be seen with underlying red marrow proliferation. EXTRACRANIAL SOFT TISSUES:  Normal.     Impression: 1. No acute infarction, edema, or mass effect.  2. There is no evidence of mesial temporal sclerosis. . The hippocampal volumes are preserved with incidental bilateral sulcus remnant cysts . 3. Left maxillary sinus retention cyst. Workstation performed: [id number]     XR chest 1 view portable    Result Date: 7/19/2023  Narrative: CHEST INDICATION:   bilateral rhonchi. COMPARISON: CXR 6/23/2023 and chest CT 6/18/2023. EXAM PERFORMED/VIEWS:  XR CHEST PORTABLE. FINDINGS: Cardiomediastinal silhouette normal. Lungs clear. No effusion or pneumothorax. Upper abdomen normal. Cholecystectomy. Bones normal for age. Impression: No acute cardiopulmonary disease. Workstation performed: [id number]     CT chest abdomen pelvis wo contrast    Addendum Date: 7/18/2023 Addendum:   ADDENDUM: As stated in the body of the report there is a pleural-based opacity in the right lower lobe near the minor fissure measuring 1 cm which was not present previously and may be atelectatic. Follow-up CT recommended in 3 months. Result Date: 7/18/2023  Narrative: CT CHEST, ABDOMEN AND PELVIS WITHOUT IV CONTRAST INDICATION:   Sepsis Seizure episode today. Recent cystitis now with diffuse abd pain +nausea +rigors for several days. hypoxemia after seizure: suspicion for aspiration. . COMPARISON: Prior CT abdomen pelvis dated January 26, 2023. Prior CT chest dated October 18, 2020 TECHNIQUE: CT examination of the chest, abdomen and pelvis was performed without intravenous contrast. Multiplanar 2D reformatted images were created from the source data. This examination, like all CT scans performed in the [facility], was performed utilizing techniques to minimize radiation dose exposure, including the use of iterative reconstruction and automated exposure control. Radiation dose length product (DLP) for this visit:  646 mGy-cm Enteric contrast was administered. FINDINGS: Patient is positioned left side/anterior down decubitus position CHEST LUNGS:  There is no tracheal or endobronchial lesion. Right lung: Pleural-parenchymal scarring right lung apex. Pleural-based opacity right lower lobe near the minor fissure measuring 1 cm, not present previously and possibly atelectatic. Left lung: Pleural-parenchymal scarring left lung apex. Atelectatic changes throughout the left upper lobe/lingula as well as basilar portions of the left lower lobe which are dependent on this left side down decubitus CT. PLEURA: No effusions. No pneumothorax. HEART/GREAT VESSELS: Heart is unremarkable for patient's age. No thoracic aortic aneurysm. Trace pericardial fluid as before. MEDIASTINUM AND CHRIS:  Unremarkable. CHEST WALL AND LOWER NECK: Fibroglandular tissue both breasts. No discrete mass. ABDOMEN LIVER/BILIARY TREE: Trace pneumobilia status post cholecystectomy. GALLBLADDER: Removed. Trace pneumobilia. Intrahepatic and extrahepatic biliary ducts are slightly dilated, within normal limits and stable from previous SPLEEN:  Unremarkable. PANCREAS:  Unremarkable. ADRENAL GLANDS:  Unremarkable. KIDNEYS/URETERS: Nonobstructing collecting system calculus upper pole right kidney measuring 3 mm. At least 2 nonobstructive left-sided renal collecting system calculus each punctate in size. No hydronephrosis or renal colic. STOMACH AND BOWEL: Limited evaluation of GI tract without oral contrast. Stomach mostly collapsed. Small bowel average caliber. Large bowel mostly collapsed. No CT evidence for colitis or diverticulitis. APPENDIX: Removed ABDOMINOPELVIC CAVITY:  No ascites. No pneumoperitoneum. No lymphadenopathy. VESSELS:  Unremarkable for patient's age. PELVIS REPRODUCTIVE ORGANS:  Unremarkable for patient's age. URINARY BLADDER:  Unremarkable. ABDOMINAL WALL/INGUINAL REGIONS: Tiny fat-containing umbilical hernia. OSSEOUS STRUCTURES:  No acute fracture or destructive osseous lesion. Concave left thoracic scoliotic curvature concave right lumbar scoliotic curvatures possibly due to positioning.      Impression: Limited exam performed without oral or IV contrast. No tracheal or endobronchial lesion. No CT evidence of aspiration. Atelectatic changes throughout the left upper lobe/lingula and basilar portions of left lower lobe due to dependent left side down positioning. Trace pneumobilia and slight extrahepatic biliary ductal dilatation, considered normal status post cholecystectomy and stable from previous. Nonobstructing renal collecting system calculi. No hydronephrosis or renal colic. Workstation performed: [id number]     CT head without contrast    Result Date: 7/18/2023  Narrative: CT BRAIN - WITHOUT CONTRAST INDICATION:   Seizure disorder, clinical change altered mental status/persistent confusion post seizure. hx seizure disorder. COMPARISON:  5/29/2023. TECHNIQUE:  CT examination of the brain was performed. Multiplanar 2D reformatted images were created from the source data. Radiation dose length product (DLP) for this visit:  1877 mGy-cm . This examination, like all CT scans performed in the [facility], was performed utilizing techniques to minimize radiation dose exposure, including the use of iterative reconstruction and automated exposure control. IMAGE QUALITY:  Diagnostic. FINDINGS: PARENCHYMA:  No intracranial mass, mass effect or midline shift. No CT signs of acute infarction. No acute parenchymal hemorrhage. VENTRICLES AND EXTRA-AXIAL SPACES:  Normal for the patient's age. VISUALIZED ORBITS: Normal visualized orbits. PARANASAL SINUSES: Cyst versus polyp is seen within the left maxillary sinus. CALVARIUM AND EXTRACRANIAL SOFT TISSUES:  Normal.     Impression: No acute intracranial abnormality. Workstation performed: [id number]     XR chest pa & lateral    Result Date: 6/25/2023  Narrative: CHEST INDICATION:   I47.1: Supraventricular tachycardia I50.30: Unspecified diastolic (congestive) heart failure R00.2: Palpitations. COMPARISON: CXR 6/11/2021 and chest CT 10/18/2020.  EXAM PERFORMED/VIEWS:  XR CHEST PA & LATERAL. FINDINGS: Cardiomediastinal silhouette normal. Lungs clear. No effusion or pneumothorax. Upper abdomen normal. Bones normal for age. Impression: No acute cardiopulmonary disease. Workstation performed: [id number]     EKG/Pathology/Other Studies:   Lab Results   Component Value Date    VENTRATE 90 07/18/2023    ATRIALRATE 90 07/18/2023    PRINT 116 07/18/2023    QRSDINT 78 07/18/2023    QTINT 372 07/18/2023    QTCINT 455 07/18/2023    PAXIS 35 07/18/2023    QRSAXIS 77 07/18/2023    TWAVEAXIS 64 07/18/2023        Code Status: Level 1 - Full Code  Advance Directive and Living Will:      Power of :    POLST:      Screenings:    1. Nutrition Screening  Nutrition Assessment (completed by Staff): Nutrition  Feeding: Able to feed self    2. Pain Screening  Pain Screening: Pain Assessment  Pain Assessment Tool: 0-10  Pain Score: 10  Pain Location/Orientation: Location: Abdomen  Pain Onset/Description: Descriptor: Headache  Patient's Stated Pain Goal: No pain  Hospital Pain Intervention(s): Medication (See MAR)  Multiple Pain Sites: No    3. Suicide Screening  ED Crisis Suicide Risk Assessment:        Danger to self-assessment  1. Wish to be Dead: Yes  2. Suicidal Thoughts: No  3. Suicidal Thoughts with Method: medication  4. Suicidal Intent without Plan: No  5. Suicidal Intent with Plan: No  6. Done, Started, or Prepared to End Life: No    Danger to Others Assessment  1. Thoughts of harming others: No  2. Thoughts of killing others: No  3. DTO thoughts without Plan: No  4. DTO Intention with Plan: No  5. DTO Intention with Plan & Means: No  6. DTO Intention with Plan, Means, and Time: No  7. Tarasoff Warning Required: No    Counseling / Coordination of Care: Total floor / unit time spent today 90 minutes. Greater than 50% of total time was spent with the patient and / or family counseling and / or coordination of care.  A description of the counseling / coordination of care: performing mental status exam; counseling and coordination of care; obtaining or reviewing history; documenting in the medical record; reviewing/ordering tests, medications or procedures; communicating with other healthcare professionals and discussing with patient's family/caregivers.
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
## 2023-07-22 NOTE — PLAN OF CARE

## 2023-07-22 NOTE — ASSESSMENT & PLAN NOTE
· History of mast cell activation syndrome involved with mast cell activation specialist at [facility] patient reports had a "mast cell activation attack"  states patient became unresponsive extremities locked up for approximately 3 to 5 minutes reports this is how her syndrome typically presents she reports he typically gets Benadryl steroids and IV fluids  · Currently at baseline  · Mental status at baseline   · Continue prednisone, benadryl

## 2023-07-22 NOTE — PLAN OF CARE
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
## 2023-07-22 NOTE — NURSING NOTE
This RN offered patient new IV site to be placed and her afternoon medications, patient refused both. Patient very argumentative through out day with , also very tearful/crying. Patient placed on virtual 1:1 due to indirect suicidal comment made to PA. When patient placed on virtual 1:1 patient ceased to be argumentative/tearful/crying - this behavior was observed by this RN and other staff that entered the room for patient care.

## 2023-07-23 LAB
BACTERIA UR QL AUTO: ABNORMAL /HPF
BILIRUB UR QL STRIP: NEGATIVE
CLARITY UR: CLEAR
COLOR UR: YELLOW
GLUCOSE UR STRIP-MCNC: NEGATIVE MG/DL
HGB UR QL STRIP.AUTO: ABNORMAL
KETONES UR STRIP-MCNC: ABNORMAL MG/DL
LEUKOCYTE ESTERASE UR QL STRIP: NEGATIVE
NITRITE UR QL STRIP: NEGATIVE
NON-SQ EPI CELLS URNS QL MICRO: ABNORMAL /HPF
PH UR STRIP.AUTO: 7 [PH]
PROT UR STRIP-MCNC: NEGATIVE MG/DL
RBC #/AREA URNS AUTO: ABNORMAL /HPF
SP GR UR STRIP.AUTO: <=1.005 (ref 1–1.03)
UROBILINOGEN UR QL STRIP.AUTO: 0.2 E.U./DL
WBC #/AREA URNS AUTO: ABNORMAL /HPF

## 2023-07-23 PROCEDURE — 99232 SBSQ HOSP IP/OBS MODERATE 35: CPT

## 2023-07-23 PROCEDURE — 81001 URINALYSIS AUTO W/SCOPE: CPT

## 2023-07-23 RX ADMIN — PREDNISONE 20 MG: 20 TABLET ORAL at 15:32

## 2023-07-23 RX ADMIN — PANTOPRAZOLE SODIUM 40 MG: 40 TABLET, DELAYED RELEASE ORAL at 15:31

## 2023-07-23 RX ADMIN — FAMOTIDINE 40 MG: 40 TABLET ORAL at 10:24

## 2023-07-23 RX ADMIN — PANTOPRAZOLE SODIUM 40 MG: 40 TABLET, DELAYED RELEASE ORAL at 07:51
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
## 2023-07-23 NOTE — NURSING NOTE
Patient  Is tearful/anxious/depressed and unable to sleep-patient requesting ativan -- made aware & orders obtained & patient was medicated with ativan 1mg po.

## 2023-07-23 NOTE — CASE MANAGEMENT
Case Management Progress Note    Patient name Simeon Suarez  Location / MRN [id number]  : 1975 Date 2023       LOS (days): 3  Geometric Mean LOS (GMLOS) (days): 3.00  Days to GMLOS:0        OBJECTIVE:        Current admission status: Inpatient  Preferred Pharmacy:   [address]  [place] [address]  Phone: [phone] Fax: [phone]    [address] [place] [address], [address] [place] [address] [address]  [id number] [place] [address]  Phone: [phone] Fax: [phone]    [facility] [address] [place] [address] [address] [place] [address]  Phone: [phone] Fax: [phone]    Primary Care Provider: Particia , DO    Primary Insurance: [facility]  Secondary Insurance: MEDICARE    PROGRESS NOTE:per in network behavioral intake no beds available. Will continue bed search.

## 2023-07-23 NOTE — CASE MANAGEMENT
Case Management Progress Note    Patient name Cece Tejeda  Location / MRN [id number]  : 1975 Date 2023       LOS (days): 3  Geometric Mean LOS (GMLOS) (days): 3.00  Days to GMLOS:0.1        OBJECTIVE:        Current admission status: Inpatient  Preferred Pharmacy:   [address]  [address]  [place] [address]  Phone: [phone] Fax: [phone]    [address] [facility], [address] [place] [address]  Phone: [phone] Fax: [phone]    [facility] [address] [place] [address]  Phone: [phone] Fax: [phone]    Primary Care Provider: Sujey Garcia DO    Primary Insurance: [facility]  Secondary Insurance: MEDICARE    PROGRESS NOTE:notified by provider that pt is agreeable to 201. Cm not on site today. Nursing supervisor Kristi agreeable to doing the 201 paperwork with pt.  Notified behavioral health intake and she provided a fax number of [phone]

## 2023-07-23 NOTE — NURSING NOTE
Patient lying in bed with spouse-smiling this am. Asked if am labs could be drawn, but patient "will let us know later".
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
## 2023-07-23 NOTE — ASSESSMENT & PLAN NOTE
· Has a history of psychogenic seizures. Had 1 additional episode while hospitalized.   · Appears to have had a tonic-clonic true seizure at home per  given elevated lactic acid suspect she did have true seizure -  likely given abrupt benzodiazepine withdrawal based on our conversation at bedside today, pt revealing she has been misusing benzos for 2-3 weeks prior to admission  · Obtain MRI brain without contrast - unremarkable  · EEG normal  · Seizure precautions   · To follow-up with epileptologist outpatient in 6 weeks  · Medically clear for BHU placement

## 2023-07-23 NOTE — PLAN OF CARE
Problem: Potential for Falls  Goal: Patient will remain free of falls  Description: INTERVENTIONS:  - Educate patient/family on patient safety including physical limitations  - Instruct patient to call for assistance with activity   - Consult OT/PT to assist with strengthening/mobility   - Keep Call bell within reach  - Keep bed low and locked with side rails adjusted as appropriate  - Keep care items and personal belongings within reach  - Initiate and maintain comfort rounds  - Make Fall Risk Sign visible to staff  - Offer Toileting every 2 Hours, in advance of need  - Initiate/Maintain bed alarm  - Apply yellow socks and bracelet for high fall risk patients  - Consider moving patient to room near nurses station  Outcome: Progressing     Problem: PAIN - ADULT  Goal: Verbalizes/displays adequate comfort level or baseline comfort level  Description: Interventions:  - Encourage patient to monitor pain and request assistance  - Assess pain using appropriate pain scale  - Administer analgesics based on type and severity of pain and evaluate response  - Implement non-pharmacological measures as appropriate and evaluate response  - Consider cultural and social influences on pain and pain management  - Notify physician/advanced practitioner if interventions unsuccessful or patient reports new pain  Outcome: Progressing     Problem: INFECTION - ADULT  Goal: Absence or prevention of progression during hospitalization  Description: INTERVENTIONS:  - Assess and monitor for signs and symptoms of infection  - Monitor lab/diagnostic results  - Monitor all insertion sites, i.e. indwelling lines, tubes, and drains  - Monitor endotracheal if appropriate and nasal secretions for changes in amount and color  - [place] appropriate cooling/warming therapies per order  - Administer medications as ordered  - Instruct and encourage patient and family to use good hand hygiene technique  - Identify and instruct in appropriate isolation precautions for identified infection/condition  Outcome: Progressing     Problem: SAFETY ADULT  Goal: Patient will remain free of falls  Description: INTERVENTIONS:  - Educate patient/family on patient safety including physical limitations  - Instruct patient to call for assistance with activity   - Consult OT/PT to assist with strengthening/mobility   - Keep Call bell within reach  - Keep bed low and locked with side rails adjusted as appropriate  - Keep care items and personal belongings within reach  - Initiate and maintain comfort rounds  - Make Fall Risk Sign visible to staff  - Offer Toileting every 2 Hours, in advance of need  - Initiate/Maintain bed alarm  - Apply yellow socks and bracelet for high fall risk patients  - Consider moving patient to room near nurses station  Outcome: Progressing     Problem: DISCHARGE PLANNING  Goal: Discharge to home or other facility with appropriate resources  Description: INTERVENTIONS:  - Identify barriers to discharge w/patient and caregiver  - Arrange for needed discharge resources and transportation as appropriate  - Identify discharge learning needs (meds, wound care, etc.)  - Arrange for interpretive services to assist at discharge as needed  - Refer to Case Management Department for coordinating discharge planning if the patient needs post-hospital services based on physician/advanced practitioner order or complex needs related to functional status, cognitive ability, or social support system  Outcome: Progressing     Problem: Knowledge Deficit  Goal: Patient/family/caregiver demonstrates understanding of disease process, treatment plan, medications, and discharge instructions  Description: Complete learning assessment and assess knowledge base.   Interventions:  - Provide teaching at level of understanding  - Provide teaching via preferred learning methods  Outcome: Progressing

## 2023-07-23 NOTE — ASSESSMENT & PLAN NOTE
· History of Crohn's  · Continue outpatient follow-up  · RUQ us w/ dilated CBD, MRCP wo choledocholithasis  · Improvement in pain today

## 2023-07-23 NOTE — ASSESSMENT & PLAN NOTE
· History of mast cell activation syndrome involved with mast cell activation specialist at [facility] patient reports had a "mast cell activation attack"  states patient became unresponsive extremities locked up for approximately 3 to 5 minutes reports this is how her syndrome typically presents she reports he typically gets Benadryl steroids and IV fluids  · Currently at baseline  · Continue prednisone 50mg with 10mg taper q3 days back to baseline prednisone 20mg daily   · Continue benadryl

## 2023-07-24 ENCOUNTER — HOSPITAL ENCOUNTER (INPATIENT)
Facility: HOSPITAL | Age: 48
LOS: 22 days | Discharge: HOME | DRG: 885 | End: 2023-08-15
Attending: STUDENT IN AN ORGANIZED HEALTH CARE EDUCATION/TRAINING PROGRAM | Admitting: STUDENT IN AN ORGANIZED HEALTH CARE EDUCATION/TRAINING PROGRAM
Payer: COMMERCIAL

## 2023-07-24 ENCOUNTER — HOSPITAL ENCOUNTER (OUTPATIENT)
Dept: INFUSION CENTER | Facility: HOSPITAL | Age: 48
Discharge: HOME | End: 2023-07-24
Attending: INTERNAL MEDICINE

## 2023-07-24 VITALS
OXYGEN SATURATION: 100 % | WEIGHT: 157.63 LBS | SYSTOLIC BLOOD PRESSURE: 136 MMHG | RESPIRATION RATE: 18 BRPM | HEIGHT: 65 IN | DIASTOLIC BLOOD PRESSURE: 93 MMHG | TEMPERATURE: 97.4 F | HEART RATE: 104 BPM | BODY MASS INDEX: 26.26 KG/M2

## 2023-07-24 DIAGNOSIS — J30.2 SEASONAL ALLERGIES: ICD-10-CM

## 2023-07-24 DIAGNOSIS — Z00.8 MEDICAL CLEARANCE FOR PSYCHIATRIC ADMISSION: ICD-10-CM

## 2023-07-24 DIAGNOSIS — F32.3 MAJOR DEPRESSIVE DISORDER WITH PSYCHOTIC FEATURES (HCC): Primary | ICD-10-CM

## 2023-07-24 DIAGNOSIS — K50.119 CROHN'S DISEASE OF COLON WITH COMPLICATION (HCC): ICD-10-CM

## 2023-07-24 DIAGNOSIS — D89.40 MAST CELL ACTIVATION SYNDROME (HCC): ICD-10-CM

## 2023-07-24 LAB
BASOPHILS # BLD AUTO: 0.03 THOUSANDS/ÂΜL (ref 0–0.1)
BASOPHILS NFR BLD AUTO: 0 % (ref 0–1)
EOSINOPHIL # BLD AUTO: 0.02 THOUSAND/ÂΜL (ref 0–0.61)
EOSINOPHIL NFR BLD AUTO: 0 % (ref 0–6)
ERYTHROCYTE [DISTWIDTH] IN BLOOD BY AUTOMATED COUNT: 15 % (ref 11.6–15.1)
HCT VFR BLD AUTO: 47.9 % (ref 34.8–46.1)
HGB BLD-MCNC: 15.8 G/DL (ref 11.5–15.4)
IMM GRANULOCYTES # BLD AUTO: 0.11 THOUSAND/UL (ref 0–0.2)
IMM GRANULOCYTES NFR BLD AUTO: 1 % (ref 0–2)
LYMPHOCYTES # BLD AUTO: 1.31 THOUSANDS/ÂΜL (ref 0.6–4.47)
LYMPHOCYTES NFR BLD AUTO: 8 % (ref 14–44)
MCH RBC QN AUTO: 30.2 PG (ref 26.8–34.3)
MCHC RBC AUTO-ENTMCNC: 33 G/DL (ref 31.4–37.4)
MCV RBC AUTO: 91 FL (ref 82–98)
MONOCYTES # BLD AUTO: 0.76 THOUSAND/ÂΜL (ref 0.17–1.22)
MONOCYTES NFR BLD AUTO: 5 % (ref 4–12)
NEUTROPHILS # BLD AUTO: 13.37 THOUSANDS/ÂΜL (ref 1.85–7.62)
NEUTS SEG NFR BLD AUTO: 86 % (ref 43–75)
NRBC BLD AUTO-RTO: 0 /100 WBCS
PLATELET # BLD AUTO: 478 THOUSANDS/UL (ref 149–390)
PMV BLD AUTO: 9.6 FL (ref 8.9–12.7)
RBC # BLD AUTO: 5.24 MILLION/UL (ref 3.81–5.12)
SARS-COV-2 RNA RESP QL NAA+PROBE: NEGATIVE
WBC # BLD AUTO: 15.6 THOUSAND/UL (ref 4.31–10.16)

## 2023-07-24 PROCEDURE — NC001 PR NO CHARGE

## 2023-07-24 PROCEDURE — 85025 COMPLETE CBC W/AUTO DIFF WBC: CPT

## 2023-07-24 PROCEDURE — 87635 SARS-COV-2 COVID-19 AMP PRB: CPT

## 2023-07-24 PROCEDURE — 99239 HOSP IP/OBS DSCHRG MGMT >30: CPT

## 2023-07-24 RX ORDER — LORAZEPAM 1 MG/1
1 TABLET ORAL
Status: DISCONTINUED | OUTPATIENT
Start: 2023-07-24 | End: 2023-08-06 | Stop reason: ALTCHOICE

## 2023-07-24 RX ORDER — HYDROXYZINE 50 MG/1
50 TABLET, FILM COATED ORAL
Status: DISCONTINUED | OUTPATIENT
Start: 2023-07-24 | End: 2023-08-15 | Stop reason: HOSPADM

## 2023-07-24 RX ORDER — LORAZEPAM 1 MG/1
1 TABLET ORAL
Status: CANCELLED | OUTPATIENT
Start: 2023-07-24

## 2023-07-24 RX ORDER — HYDROXYZINE HYDROCHLORIDE 25 MG/1
25 TABLET, FILM COATED ORAL
Status: DISCONTINUED | OUTPATIENT
Start: 2023-07-24 | End: 2023-08-15 | Stop reason: HOSPADM

## 2023-07-24 RX ORDER — LORAZEPAM 2 MG/ML
1 INJECTION INTRAMUSCULAR
Status: CANCELLED | OUTPATIENT
Start: 2023-07-24

## 2023-07-24 RX ORDER — HALOPERIDOL 5 MG/ML
5 INJECTION INTRAMUSCULAR
Status: CANCELLED | OUTPATIENT
Start: 2023-07-24

## 2023-07-24 RX ORDER — ACETAMINOPHEN 325 MG/1
650 TABLET ORAL EVERY 4 HOURS PRN
Status: CANCELLED | OUTPATIENT
Start: 2023-07-24

## 2023-07-24 RX ORDER — LORAZEPAM 2 MG/ML
1 INJECTION INTRAMUSCULAR
Status: DISCONTINUED | OUTPATIENT
Start: 2023-07-24 | End: 2023-08-15 | Stop reason: HOSPADM

## 2023-07-24 RX ORDER — RISPERIDONE 0.25 MG/1
0.25 TABLET ORAL
Status: CANCELLED | OUTPATIENT
Start: 2023-07-24

## 2023-07-24 RX ORDER — BUTALBITAL, ACETAMINOPHEN AND CAFFEINE 50; 325; 40 MG/1; MG/1; MG/1
1 TABLET ORAL EVERY 8 HOURS PRN
Status: CANCELLED | OUTPATIENT
Start: 2023-07-24

## 2023-07-24 RX ORDER — TRAZODONE HYDROCHLORIDE 50 MG/1
50 TABLET ORAL
Status: DISCONTINUED | OUTPATIENT
Start: 2023-07-24 | End: 2023-07-29

## 2023-07-24 RX ORDER — LORAZEPAM 1 MG/1
1 TABLET ORAL 2 TIMES DAILY PRN
Status: DISCONTINUED | OUTPATIENT
Start: 2023-07-24 | End: 2023-07-25

## 2023-07-24 RX ORDER — RISPERIDONE 0.25 MG/1
0.5 TABLET ORAL
Status: CANCELLED | OUTPATIENT
Start: 2023-07-24

## 2023-07-24 RX ORDER — RISPERIDONE 1 MG/1
1 TABLET ORAL
Status: DISCONTINUED | OUTPATIENT
Start: 2023-07-24 | End: 2023-08-15 | Stop reason: HOSPADM

## 2023-07-24 RX ORDER — HYDROXYZINE HYDROCHLORIDE 25 MG/1
50 TABLET, FILM COATED ORAL
Status: CANCELLED | OUTPATIENT
Start: 2023-07-24

## 2023-07-24 RX ORDER — LORAZEPAM 1 MG/1
1 TABLET ORAL 2 TIMES DAILY PRN
Status: CANCELLED | OUTPATIENT
Start: 2023-07-24

## 2023-07-24 RX ORDER — RISPERIDONE 0.25 MG/1
0.25 TABLET ORAL
Status: DISCONTINUED | OUTPATIENT
Start: 2023-07-24 | End: 2023-08-15 | Stop reason: HOSPADM

## 2023-07-24 RX ORDER — ACETAMINOPHEN 325 MG/1
975 TABLET ORAL EVERY 6 HOURS PRN
Status: CANCELLED | OUTPATIENT
Start: 2023-07-24

## 2023-07-24 RX ORDER — HALOPERIDOL 5 MG/ML
5 INJECTION INTRAMUSCULAR
Status: DISCONTINUED | OUTPATIENT
Start: 2023-07-24 | End: 2023-08-15 | Stop reason: HOSPADM

## 2023-07-24 RX ORDER — ACETAMINOPHEN 325 MG/1
650 TABLET ORAL EVERY 4 HOURS PRN
Status: DISCONTINUED | OUTPATIENT
Start: 2023-07-24 | End: 2023-08-15 | Stop reason: HOSPADM

## 2023-07-24 RX ORDER — RISPERIDONE 0.25 MG/1
1 TABLET ORAL
Status: CANCELLED | OUTPATIENT
Start: 2023-07-24

## 2023-07-24 RX ORDER — TRAZODONE HYDROCHLORIDE 50 MG/1
50 TABLET ORAL
Status: CANCELLED | OUTPATIENT
Start: 2023-07-24

## 2023-07-24 RX ORDER — RISPERIDONE 0.5 MG/1
0.5 TABLET ORAL
Status: DISCONTINUED | OUTPATIENT
Start: 2023-07-24 | End: 2023-08-15 | Stop reason: HOSPADM

## 2023-07-24 RX ORDER — ACETAMINOPHEN 325 MG/1
975 TABLET ORAL EVERY 6 HOURS PRN
Status: DISCONTINUED | OUTPATIENT
Start: 2023-07-24 | End: 2023-08-15 | Stop reason: HOSPADM

## 2023-07-24 RX ORDER — HYDROXYZINE HYDROCHLORIDE 25 MG/1
25 TABLET, FILM COATED ORAL
Status: CANCELLED | OUTPATIENT
Start: 2023-07-24

## 2023-07-24 RX ORDER — LORAZEPAM 2 MG/ML
1 INJECTION INTRAMUSCULAR
Status: DISCONTINUED | OUTPATIENT
Start: 2023-07-24 | End: 2023-07-24

## 2023-07-24 RX ORDER — BUTALBITAL, ACETAMINOPHEN AND CAFFEINE 50; 325; 40 MG/1; MG/1; MG/1
1 TABLET ORAL EVERY 8 HOURS PRN
Status: DISCONTINUED | OUTPATIENT
Start: 2023-07-24 | End: 2023-08-15 | Stop reason: HOSPADM

## 2023-07-24 RX ADMIN — PREDNISONE 20 MG: 20 TABLET ORAL at 09:00

## 2023-07-24 RX ADMIN — PANTOPRAZOLE SODIUM 40 MG: 40 TABLET, DELAYED RELEASE ORAL at 06:00

## 2023-07-24 RX ADMIN — DIPHENHYDRAMINE HYDROCHLORIDE 25 MG: 25 TABLET ORAL at 09:03

## 2023-07-24 RX ADMIN — LORATADINE 10 MG: 10 TABLET ORAL at 09:05

## 2023-07-24 RX ADMIN — PANTOPRAZOLE SODIUM 40 MG: 40 TABLET, DELAYED RELEASE ORAL at 15:59

## 2023-07-24 RX ADMIN — PREDNISONE 20 MG: 20 TABLET ORAL at 15:59

## 2023-07-24 RX ADMIN — DIPHENHYDRAMINE HYDROCHLORIDE 25 MG: 25 TABLET ORAL at 15:59

## 2023-07-24 RX ADMIN — MONTELUKAST 10 MG: 10 TABLET, FILM COATED ORAL at 09:06

## 2023-07-24 NOTE — CASE MANAGEMENT
Case Management Discharge Planning Note    Patient name Karoline Messenger  Location / MRN [id number]  : 1975 Date 2023       Current Admission Date: 2023  Current Admission Diagnosis:Acute encephalopathy   Patient Active Problem List    Diagnosis Date Noted   • Passive suicidal ideations 2023   • Acute encephalopathy 2023   • Systemic lupus erythematosus, unspecified SLE type, unspecified organ involvement status ([address]) 2023   • Bladder tumor 2023   • Seizure ([address])    • Immunosuppression due to chronic steroid use ([address]) 2022   • Atrial tachycardia ([address]) 2022   • Nephrolithiasis 2021   • Anaphylactic reaction 2021   • Intractable nausea and vomiting 2021   • Heart palpitations 2021   • Inflammatory bowel disease 2021   • Current chronic use of systemic steroids 2021   • MS (multiple sclerosis) ([address]) 2021   • Migraine    • Overweight (BMI 25.0-29.9) 2021   • Anorexia 10/17/2020   • Crohn's colitis ([address]) 10/17/2020   • Mild protein-calorie malnutrition ([address]) 2019   • Constipation 09/10/2019   • Mass of left axilla 2019   • Immunosuppressed status ([address]) 2019   • Thrombocytosis 2019   • Proctitis 2019   • Thrush 2019   • Chronic back pain 2019   • Primary localized osteoarthrosis of ankle and foot 2018   • Fibromyalgia 2017   • CHF (congestive heart failure) ([address]) 2017   • Meniere's disease 2017   • Leukocytosis 09/10/2017   • Hypomagnesemia 2017   • Generalized anxiety disorder 2017   • Vomiting and diarrhea 2017   • SIRS (systemic inflammatory response syndrome) ([address]) 2017   • Vitamin D deficiency 2017   • Hypokalemia 2017   • Throat tightness 2017   • Abdominal pain 2017   • Headache 2017   • Disorder of synovium 2017   • Chondromalacia 2017   • Chronic idiopathic urticaria 05/02/2017   • 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   • Hypothyroidism (acquired) 01/13/2014      LOS (days): 4  Geometric Mean LOS (GMLOS) (days): 3.00  Days to GMLOS:-1     OBJECTIVE:  Risk of Unplanned Readmission Score: 23.38         Current admission status: Inpatient   Preferred Pharmacy:   [address]  [address]  [facility] [address]  Phone: [phone] Fax: [phone]    [address] [facility], [address] [place] [address] Drive [address]  Phone: [phone] Fax: [phone]    [facility] (Special[facility]) [address] [place] [address] [address] [facility]  Phone: [phone] Fax: [phone]    Primary Care Provider: Jaja Del Castillo DO    Primary Insurance: [address]  Secondary Insurance: MEDICARE    DISCHARGE DETAILS:  Pt was accepted at [facility] 6T by Dr Natalie Mistry. .CM calling insurance to do precertification ([facility] [phone])

## 2023-07-24 NOTE — NURSING NOTE
Patient is having periods of confusion to time/place and situation. Needs frequent reorientation to surroundings. She is cooperative and is maintained on a virtual 1:1 with frequent nursing checks. Bed alarm is intact. Dr. Niecy Sims was made aware. No s/s of suicidal ideation at present time.

## 2023-07-24 NOTE — CASE MANAGEMENT
Case Management Discharge Planning Note    Patient name Amalia Sanders  Location / MRN [id number]  : 1975 Date 2023       Current Admission Date: 2023  Current Admission Diagnosis:Acute encephalopathy   Patient Active Problem List    Diagnosis Date Noted   • Passive suicidal ideations 2023   • Acute encephalopathy 2023   • Systemic lupus erythematosus, unspecified SLE type, unspecified organ involvement status ([address]) 2023   • Bladder tumor 2023   • Seizure ([address])    • Immunosuppression due to chronic steroid use ([address]) 2022   • Atrial tachycardia ([address]) 2022   • Nephrolithiasis 2021   • Anaphylactic reaction 2021   • Intractable nausea and vomiting 2021   • Heart palpitations 2021   • Inflammatory bowel disease 2021   • Current chronic use of systemic steroids 2021   • MS (multiple sclerosis) ([address]) 2021   • Migraine    • Overweight (BMI 25.0-29.9) 2021   • Anorexia 10/17/2020   • Crohn's colitis ([address]) 10/17/2020   • Mild protein-calorie malnutrition ([address]) 2019   • Constipation 09/10/2019   • Mass of left axilla 2019   • Immunosuppressed status ([address]) 2019   • Thrombocytosis 2019   • Proctitis 2019   • Thrush 2019   • Chronic back pain 2019   • Primary localized osteoarthrosis of ankle and foot 2018   • Fibromyalgia 2017   • CHF (congestive heart failure) ([address]) 2017   • Meniere's disease 2017   • Leukocytosis 09/10/2017   • Hypomagnesemia 2017   • Generalized anxiety disorder 2017   • Vomiting and diarrhea 2017   • SIRS (systemic inflammatory response syndrome) ([address]) 2017   • Vitamin D deficiency 2017   • Hypokalemia 2017   • Throat tightness 2017   • Abdominal pain 2017   • Headache 2017   • Disorder of synovium 2017   • Chondromalacia 2017   • Chronic idiopathic urticaria 05/02/2017   • 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   • Hypothyroidism (acquired) 01/13/2014      LOS (days): 4  Geometric Mean LOS (GMLOS) (days): 3.00  Days to GMLOS:-1.1     OBJECTIVE:  Risk of Unplanned Readmission Score: 23.38         Current admission status: Inpatient   Preferred Pharmacy:   [address], [address]  [address]  [facility] [address]  Phone: [phone] Fax: [phone]    [address] [facility], [address] [facility] [address] [place] [address] [address] [address]  Phone: [phone] Fax: [phone]    [facility]) [address] [place] [address]  [address] [place] [address] [address]  Phone: [phone] Fax: [phone]    Primary Care Provider: Julia Torres DO    Primary Insurance: [facility]  Secondary Insurance: MEDICARE    DISCHARGE DETAILS:  Spoke with Rafaela Gilbert at [facility]. [phone]. Pending Agatha Curry is [id number]. Clinicals were faxed to [phone]. Per Elie Crimes pt can be dc'd to the inpatient psych with the pending auth/reference number. CM working on arranging ambulance transport in [place].

## 2023-07-24 NOTE — ASSESSMENT & PLAN NOTE
· No abdominal pain, nausea, vomiting, diarrhea  · History of Crohn's  · Continue outpatient follow-up  · RUQ us w/ dilated CBD, MRCP wo choledocholithasis

## 2023-07-24 NOTE — ASSESSMENT & PLAN NOTE
· History of mast cell activation syndrome involved with mast cell activation specialist at [facility] patient reports had a "mast cell activation attack"  states patient became unresponsive extremities locked up for approximately 3 to 5 minutes reports this is how her syndrome typically presents she reports he typically gets Benadryl steroids and IV fluids  · Currently at baseline  · Continue prednisone 40mg with 10mg taper q3 days back to baseline prednisone 20mg daily   · Continue benadryl

## 2023-07-24 NOTE — NURSING NOTE
YULIA Curtis made aware of 's concern of patient's confusion. Patient is stating there is formaldehyde in her house and everyone needs to get out.
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
## 2023-07-24 NOTE — ASSESSMENT & PLAN NOTE
· Resolved  · Has a history of psychogenic seizures. Had 1 additional episode while hospitalized.   · Appears to have had a tonic-clonic true seizure at home per  given elevated lactic acid suspect she did have true seizure -  likely given abrupt benzodiazepine withdrawal based on our conversation at bedside today, pt revealing she has been misusing benzos for 2-3 weeks prior to admission  · Obtain MRI brain without contrast - unremarkable  · EEG normal  · Seizure precautions    · To follow-up with epileptologist outpatient in 6 weeks  · Medically clear for BHU placement

## 2023-07-24 NOTE — PLAN OF CARE

## 2023-07-24 NOTE — ASSESSMENT & PLAN NOTE
· Suspect reactive from steroid use - has been on high dose IV Solu-Medrol, now transitioned to prednisone  · Is afebrile and without complaint to suggest infectious source

## 2023-07-24 NOTE — ASSESSMENT & PLAN NOTE
· Suspect reactive from steroid use - has been on high dose IV Solu-Medrol, now transitioned to prednisone  · Is afebrile and without complaint to suggest infectious source  · Chronic
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
## 2023-07-24 NOTE — ASSESSMENT & PLAN NOTE
· History of mast cell activation syndrome involved with mast cell activation specialist at [facility] patient reports had a "mast cell activation attack"  states patient became unresponsive extremities locked up for approximately 3 to 5 minutes reports this is how her syndrome typically presents she reports he typically gets Benadryl steroids and IV fluids  · Currently at baseline  · Continue prednisone 40mg with 10mg taper q3 days back to baseline prednisone 20mg daily   · Continue benadryl

## 2023-07-25 PROBLEM — Z00.8 MEDICAL CLEARANCE FOR PSYCHIATRIC ADMISSION: Status: ACTIVE | Noted: 2023-07-25

## 2023-07-25 PROBLEM — J30.2 SEASONAL ALLERGIES: Status: ACTIVE | Noted: 2023-07-25

## 2023-07-25 PROBLEM — G93.49 ENCEPHALOPATHY CHRONIC: Status: ACTIVE | Noted: 2023-07-19

## 2023-07-25 PROBLEM — F32.3 MAJOR DEPRESSIVE DISORDER WITH PSYCHOTIC FEATURES (HCC): Status: ACTIVE | Noted: 2023-07-25

## 2023-07-25 PROBLEM — F13.90 BENZODIAZEPINE MISUSE: Status: ACTIVE | Noted: 2023-07-25

## 2023-07-25 PROBLEM — K21.9 GERD WITHOUT ESOPHAGITIS: Status: ACTIVE | Noted: 2023-07-25

## 2023-07-25 LAB
25(OH)D3 SERPL-MCNC: 19.6 NG/ML (ref 30–100)
ALBUMIN SERPL BCP-MCNC: 4.4 G/DL (ref 3.5–5)
ALP SERPL-CCNC: 55 U/L (ref 34–104)
ALT SERPL W P-5'-P-CCNC: 39 U/L (ref 7–52)
ANION GAP SERPL CALCULATED.3IONS-SCNC: 12 MMOL/L
ANION GAP SERPL CALCULATED.3IONS-SCNC: 18 MMOL/L
AST SERPL W P-5'-P-CCNC: 22 U/L (ref 13–39)
ATRIAL RATE: 99 BPM
BASOPHILS # BLD AUTO: 0.07 THOUSANDS/ÂΜL (ref 0–0.1)
BASOPHILS NFR BLD AUTO: 1 % (ref 0–1)
BILIRUB SERPL-MCNC: 0.55 MG/DL (ref 0.2–1)
BUN SERPL-MCNC: 10 MG/DL (ref 5–25)
BUN SERPL-MCNC: 8 MG/DL (ref 5–25)
CALCIUM SERPL-MCNC: 9.5 MG/DL (ref 8.4–10.2)
CALCIUM SERPL-MCNC: 9.8 MG/DL (ref 8.4–10.2)
CHLORIDE SERPL-SCNC: 105 MMOL/L (ref 96–108)
CHLORIDE SERPL-SCNC: 98 MMOL/L (ref 96–108)
CO2 SERPL-SCNC: 21 MMOL/L (ref 21–32)
CO2 SERPL-SCNC: 25 MMOL/L (ref 21–32)
CREAT SERPL-MCNC: 0.64 MG/DL (ref 0.6–1.3)
CREAT SERPL-MCNC: 0.81 MG/DL (ref 0.6–1.3)
EOSINOPHIL # BLD AUTO: 0.09 THOUSAND/ÂΜL (ref 0–0.61)
EOSINOPHIL NFR BLD AUTO: 1 % (ref 0–6)
ERYTHROCYTE [DISTWIDTH] IN BLOOD BY AUTOMATED COUNT: 15.3 % (ref 11.6–15.1)
FOLATE SERPL-MCNC: 21.2 NG/ML
GFR SERPL CREATININE-BSD FRML MDRD: 104 ML/MIN/1.73SQ M
GFR SERPL CREATININE-BSD FRML MDRD: 84 ML/MIN/1.73SQ M
GLUCOSE SERPL-MCNC: 121 MG/DL (ref 65–140)
GLUCOSE SERPL-MCNC: 133 MG/DL (ref 65–140)
HCT VFR BLD AUTO: 47 % (ref 34.8–46.1)
HGB BLD-MCNC: 15.1 G/DL (ref 11.5–15.4)
IMM GRANULOCYTES # BLD AUTO: 0.11 THOUSAND/UL (ref 0–0.2)
IMM GRANULOCYTES NFR BLD AUTO: 1 % (ref 0–2)
LYMPHOCYTES # BLD AUTO: 2.57 THOUSANDS/ÂΜL (ref 0.6–4.47)
LYMPHOCYTES NFR BLD AUTO: 17 % (ref 14–44)
MAGNESIUM SERPL-MCNC: 2.1 MG/DL (ref 1.9–2.7)
MAGNESIUM SERPL-MCNC: 2.3 MG/DL (ref 1.9–2.7)
MCH RBC QN AUTO: 29.3 PG (ref 26.8–34.3)
MCHC RBC AUTO-ENTMCNC: 32.1 G/DL (ref 31.4–37.4)
MCV RBC AUTO: 91 FL (ref 82–98)
MONOCYTES # BLD AUTO: 1.19 THOUSAND/ÂΜL (ref 0.17–1.22)
MONOCYTES NFR BLD AUTO: 8 % (ref 4–12)
NEUTROPHILS # BLD AUTO: 10.8 THOUSANDS/ÂΜL (ref 1.85–7.62)
NEUTS SEG NFR BLD AUTO: 72 % (ref 43–75)
NRBC BLD AUTO-RTO: 0 /100 WBCS
P AXIS: 33 DEGREES
PHOSPHATE SERPL-MCNC: 3.1 MG/DL (ref 2.7–4.5)
PLATELET # BLD AUTO: 642 THOUSANDS/UL (ref 149–390)
PMV BLD AUTO: 9 FL (ref 8.9–12.7)
POTASSIUM SERPL-SCNC: 2.7 MMOL/L (ref 3.5–5.3)
POTASSIUM SERPL-SCNC: 5.4 MMOL/L (ref 3.5–5.3)
PR INTERVAL: 118 MS
PROT SERPL-MCNC: 7.4 G/DL (ref 6.4–8.4)
QRS AXIS: 21 DEGREES
QRSD INTERVAL: 86 MS
QT INTERVAL: 538 MS
QTC INTERVAL: 690 MS
RBC # BLD AUTO: 5.16 MILLION/UL (ref 3.81–5.12)
SODIUM SERPL-SCNC: 138 MMOL/L (ref 135–147)
SODIUM SERPL-SCNC: 141 MMOL/L (ref 135–147)
T WAVE AXIS: 54 DEGREES
VENTRICULAR RATE: 99 BPM
VIT B12 SERPL-MCNC: 1323 PG/ML (ref 180–914)
WBC # BLD AUTO: 14.83 THOUSAND/UL (ref 4.31–10.16)

## 2023-07-25 PROCEDURE — 99223 1ST HOSP IP/OBS HIGH 75: CPT | Performed by: STUDENT IN AN ORGANIZED HEALTH CARE EDUCATION/TRAINING PROGRAM

## 2023-07-25 PROCEDURE — 82306 VITAMIN D 25 HYDROXY: CPT

## 2023-07-25 PROCEDURE — 82746 ASSAY OF FOLIC ACID SERUM: CPT

## 2023-07-25 PROCEDURE — 84100 ASSAY OF PHOSPHORUS: CPT

## 2023-07-25 PROCEDURE — 99222 1ST HOSP IP/OBS MODERATE 55: CPT | Performed by: NURSE PRACTITIONER

## 2023-07-25 PROCEDURE — 83735 ASSAY OF MAGNESIUM: CPT | Performed by: NURSE PRACTITIONER

## 2023-07-25 PROCEDURE — 80053 COMPREHEN METABOLIC PANEL: CPT

## 2023-07-25 PROCEDURE — 80048 BASIC METABOLIC PNL TOTAL CA: CPT | Performed by: NURSE PRACTITIONER

## 2023-07-25 PROCEDURE — 83735 ASSAY OF MAGNESIUM: CPT

## 2023-07-25 PROCEDURE — 82607 VITAMIN B-12: CPT

## 2023-07-25 PROCEDURE — 85025 COMPLETE CBC W/AUTO DIFF WBC: CPT

## 2023-07-25 PROCEDURE — 93005 ELECTROCARDIOGRAM TRACING: CPT

## 2023-07-25 PROCEDURE — 93010 ELECTROCARDIOGRAM REPORT: CPT | Performed by: INTERNAL MEDICINE

## 2023-07-25 RX ORDER — CETIRIZINE HYDROCHLORIDE 10 MG/1
20 TABLET ORAL DAILY
Status: DISCONTINUED | OUTPATIENT
Start: 2023-07-25 | End: 2023-08-15 | Stop reason: HOSPADM

## 2023-07-25 RX ORDER — PREDNISONE 20 MG/1
20 TABLET ORAL 2 TIMES DAILY WITH MEALS
Status: COMPLETED | OUTPATIENT
Start: 2023-07-25 | End: 2023-07-26

## 2023-07-25 RX ORDER — POTASSIUM CHLORIDE 20MEQ/15ML
40 LIQUID (ML) ORAL
Status: DISCONTINUED | OUTPATIENT
Start: 2023-07-25 | End: 2023-07-25

## 2023-07-25 RX ORDER — FAMOTIDINE 20 MG/1
40 TABLET, FILM COATED ORAL DAILY
Status: DISCONTINUED | OUTPATIENT
Start: 2023-07-25 | End: 2023-08-10

## 2023-07-25 RX ORDER — ALBUTEROL SULFATE 90 UG/1
2 AEROSOL, METERED RESPIRATORY (INHALATION) EVERY 4 HOURS PRN
Status: DISCONTINUED | OUTPATIENT
Start: 2023-07-25 | End: 2023-08-15 | Stop reason: HOSPADM

## 2023-07-25 RX ORDER — PREDNISONE 20 MG/1
20 TABLET ORAL DAILY
Status: DISCONTINUED | OUTPATIENT
Start: 2023-07-28 | End: 2023-08-15 | Stop reason: HOSPADM

## 2023-07-25 RX ORDER — MONTELUKAST SODIUM 10 MG/1
10 TABLET ORAL
Status: DISCONTINUED | OUTPATIENT
Start: 2023-07-25 | End: 2023-08-15 | Stop reason: HOSPADM

## 2023-07-25 RX ORDER — PANTOPRAZOLE SODIUM 40 MG/1
40 TABLET, DELAYED RELEASE ORAL
Status: DISCONTINUED | OUTPATIENT
Start: 2023-07-25 | End: 2023-08-13

## 2023-07-25 RX ORDER — LORAZEPAM 0.5 MG/1
0.5 TABLET ORAL 2 TIMES DAILY PRN
Status: DISCONTINUED | OUTPATIENT
Start: 2023-07-25 | End: 2023-08-07

## 2023-07-25 RX ORDER — LIOTHYRONINE SODIUM 5 UG/1
5 TABLET ORAL DAILY
Status: DISCONTINUED | OUTPATIENT
Start: 2023-07-25 | End: 2023-08-15 | Stop reason: HOSPADM

## 2023-07-25 RX ORDER — SERTRALINE HYDROCHLORIDE 25 MG/1
25 TABLET, FILM COATED ORAL DAILY
Status: DISCONTINUED | OUTPATIENT
Start: 2023-07-26 | End: 2023-07-27

## 2023-07-25 RX ORDER — EPINEPHRINE 1 MG/ML
0.3 INJECTION, SOLUTION, CONCENTRATE INTRAVENOUS DAILY PRN
Status: DISCONTINUED | OUTPATIENT
Start: 2023-07-25 | End: 2023-08-15 | Stop reason: HOSPADM

## 2023-07-25 RX ORDER — DIPHENHYDRAMINE HYDROCHLORIDE 50 MG/ML
50 INJECTION INTRAMUSCULAR; INTRAVENOUS EVERY 6 HOURS PRN
Status: DISCONTINUED | OUTPATIENT
Start: 2023-07-25 | End: 2023-08-03

## 2023-07-25 RX ORDER — POTASSIUM CHLORIDE 20MEQ/15ML
40 LIQUID (ML) ORAL
Status: COMPLETED | OUTPATIENT
Start: 2023-07-25 | End: 2023-07-25

## 2023-07-25 RX ORDER — LEVOTHYROXINE SODIUM 0.1 MG/1
100 TABLET ORAL
Status: DISCONTINUED | OUTPATIENT
Start: 2023-07-25 | End: 2023-08-15 | Stop reason: HOSPADM

## 2023-07-25 RX ORDER — ARIPIPRAZOLE 2 MG/1
2 TABLET ORAL DAILY
Status: DISCONTINUED | OUTPATIENT
Start: 2023-07-25 | End: 2023-07-26

## 2023-07-25 RX ORDER — TRAMADOL HYDROCHLORIDE 50 MG/1
50 TABLET ORAL EVERY 6 HOURS PRN
Status: DISCONTINUED | OUTPATIENT
Start: 2023-07-25 | End: 2023-08-15 | Stop reason: HOSPADM

## 2023-07-25 RX ORDER — EPINEPHRINE 1 MG/ML
0.3 INJECTION, SOLUTION, CONCENTRATE INTRAVENOUS ONCE
Status: DISCONTINUED | OUTPATIENT
Start: 2023-07-25 | End: 2023-07-25

## 2023-07-25 RX ADMIN — PREDNISONE 20 MG: 20 TABLET ORAL at 16:45

## 2023-07-25 RX ADMIN — POTASSIUM CHLORIDE 40 MEQ: 20 SOLUTION ORAL at 16:44

## 2023-07-25 RX ADMIN — LORAZEPAM 1 MG: 1 TABLET ORAL at 01:15

## 2023-07-25 RX ADMIN — LIOTHYRONINE SODIUM 5 MCG: 5 TABLET ORAL at 11:02

## 2023-07-25 RX ADMIN — POTASSIUM CHLORIDE 40 MEQ: 20 SOLUTION ORAL at 12:40

## 2023-07-25 RX ADMIN — ARIPIPRAZOLE 2 MG: 2 TABLET ORAL at 16:44

## 2023-07-25 RX ADMIN — MONTELUKAST 10 MG: 10 TABLET, FILM COATED ORAL at 21:24

## 2023-07-25 RX ADMIN — PREDNISONE 20 MG: 20 TABLET ORAL at 11:01

## 2023-07-25 RX ADMIN — FAMOTIDINE 40 MG: 20 TABLET ORAL at 11:01

## 2023-07-25 RX ADMIN — PANTOPRAZOLE SODIUM 40 MG: 40 TABLET, DELAYED RELEASE ORAL at 11:01

## 2023-07-25 RX ADMIN — LEVOTHYROXINE SODIUM 100 MCG: 100 TABLET ORAL at 11:01

## 2023-07-25 RX ADMIN — POTASSIUM CHLORIDE 40 MEQ: 20 SOLUTION ORAL at 14:45

## 2023-07-25 NOTE — CMS CERTIFICATION NOTE
Certification: Based upon physical, mental and social evaluations, I certify that inpatient psychiatric services are medically necessary for this patient for a duration of 12 midnights for the treatment of  MDD sever with psychosis.

## 2023-07-25 NOTE — PLAN OF CARE
Problem: Alteration in Thoughts and Perception  Goal: Treatment Goal: Gain control of psychotic behaviors/thinking, reduce/eliminate presenting symptoms and demonstrate improved reality functioning upon discharge  Outcome: Progressing  Goal: Verbalize thoughts and feelings  Description: Interventions:  - Promote a nonjudgmental and trusting relationship with the patient through active listening and therapeutic communication  - Assess patient's level of functioning, behavior and potential for risk  - Engage patient in 1 on 1 interactions  - Encourage patient to express fears, feelings, frustrations, and discuss symptoms    - [place] patient to reality, help patient recognize reality-based thinking   - Administer medications as ordered and assess for potential side effects  - Provide the patient education related to the signs and symptoms of the illness and desired effects of prescribed medications  Outcome: Progressing  Goal: Refrain from acting on delusional thinking/internal stimuli  Description: Interventions:  - Monitor patient closely, per order   - Utilize least restrictive measures   - Set reasonable limits, give positive feedback for acceptable   - Administer medications as ordered and monitor of potential side effects  Outcome: Progressing  Goal: Agree to be compliant with medication regime, as prescribed and report medication side effects  Description: Interventions:  - Offer appropriate PRN medication and supervise ingestion; conduct AIMS, as needed   Outcome: Progressing  Goal: Recognize dysfunctional thoughts, communicate reality-based thoughts at the time of discharge  Description: Interventions:  - Provide medication and psycho-education to assist patient in compliance and developing insight into his/her illness   Outcome: Progressing  Goal: Complete daily ADLs, including personal hygiene independently, as able  Description: Interventions:  - Observe, teach, and assist patient with ADLS  - Monitor and promote a balance of rest/activity, with adequate nutrition and elimination   Outcome: Progressing     Problem: Ineffective Coping  Goal: Cooperates with admission process  Description: Interventions:   - Complete admission process  Outcome: Progressing  Goal: Identifies ineffective coping skills  Outcome: Progressing  Goal: Identifies healthy coping skills  Outcome: Progressing  Goal: Demonstrates healthy coping skills  Outcome: Progressing  Goal: Patient/Family participate in treatment and DC plans  Description: Interventions:  - Provide therapeutic environment  Outcome: Progressing  Goal: Patient/Family verbalizes awareness of resources  Outcome: Progressing  Goal: Understands least restrictive measures  Description: Interventions:  - Utilize least restrictive behavior  Outcome: Progressing  Goal: Free from restraint events  Description: - Utilize least restrictive measures   - Provide behavioral interventions   - Redirect inappropriate behaviors   Outcome: Progressing     Problem: Risk for Self Injury/Neglect  Goal: Treatment Goal: Remain safe during length of stay, learn and adopt new coping skills, and be free of self-injurious ideation, impulses and acts at the time of discharge  Outcome: Progressing  Goal: Verbalize thoughts and feelings  Description: Interventions:  - Assess and re-assess patient's lethality and potential for self-injury  - Engage patient in 1:1 interactions, daily, for a minimum of 15 minutes  - Encourage patient to express feelings, fears, frustrations, hopes  - Establish rapport/trust with patient   Outcome: Progressing  Goal: Refrain from harming self  Description: Interventions:  - Monitor patient closely, per order  - Develop a trusting relationship  - Supervise medication ingestion, monitor effects and side effects   Outcome: Progressing  Goal: Recognize maladaptive responses and adopt new coping mechanisms  Outcome: Progressing  Goal: Complete daily ADLs, including personal hygiene independently, as able  Description: Interventions:  - Observe, teach, and assist patient with ADLS  - Monitor and promote a balance of rest/activity, with adequate nutrition and elimination  Outcome: Progressing     Problem: Depression  Goal: Treatment Goal: Demonstrate behavioral control of depressive symptoms, verbalize feelings of improved mood/affect, and adopt new coping skills prior to discharge  Outcome: Progressing  Goal: Verbalize thoughts and feelings  Description: Interventions:  - Assess and re-assess patient's level of risk   - Engage patient in 1:1 interactions, daily, for a minimum of 15 minutes   - Encourage patient to express feelings, fears, frustrations, hopes   Outcome: Progressing  Goal: Refrain from harming self  Description: Interventions:  - Monitor patient closely, per order   - Supervise medication ingestion, monitor effects and side effects   Outcome: Progressing  Goal: Refrain from isolation  Description: Interventions:  - Develop a trusting relationship   - Encourage socialization   Outcome: Progressing  Goal: Refrain from self-neglect  Outcome: Progressing  Goal: Complete daily ADLs, including personal hygiene independently, as able  Description: Interventions:  - Observe, teach, and assist patient with ADLS  -  Monitor and promote a balance of rest/activity, with adequate nutrition and elimination   Outcome: Progressing     Problem: Anxiety  Goal: Anxiety is at manageable level  Description: Interventions:  - Assess and monitor patient's anxiety level. - Monitor for signs and symptoms (heart palpitations, chest pain, shortness of breath, headaches, nausea, feeling jumpy, restlessness, irritable, apprehensive). - Collaborate with interdisciplinary team and initiate plan and interventions as ordered.   - [place] patient to unit/surroundings  - Explain treatment plan  - Encourage participation in care  - Encourage verbalization of concerns/fears  - Identify coping mechanisms  - Assist in developing anxiety-reducing skills  - Administer/offer alternative therapies  - Limit or eliminate stimulants  Outcome: Progressing     Problem: Risk for Violence/Aggression Toward Others  Goal: Treatment Goal: Refrain from acts of violence/aggression during length of stay, and demonstrate improved impulse control at the time of discharge  Outcome: Progressing  Goal: Verbalize thoughts and feelings  Description: Interventions:  - Assess and re-assess patient's level of risk, every waking shift  - Engage patient in 1:1 interactions, daily, for a minimum of 15 minutes   - Allow patient to express feelings and frustrations in a safe and non-threatening manner   - Establish rapport/trust with patient   Outcome: Progressing  Goal: Refrain from harming others  Outcome: Progressing  Goal: Refrain from destructive acts on the environment or property  Outcome: Progressing  Goal: Control angry outbursts  Description: Interventions:  - Monitor patient closely, per order  - Ensure early verbal de-escalation  - Monitor prn medication needs  - Set reasonable/therapeutic limits, outline behavioral expectations, and consequences   - Provide a non-threatening milieu, utilizing the least restrictive interventions   Outcome: Progressing  Goal: Identify appropriate positive anger management techniques  Description: Interventions:  - Offer anger management and coping skills groups   - Staff will provide positive feedback for appropriate anger control  Outcome: Progressing     Problem: Alteration in Orientation  Goal: Treatment Goal: Demonstrate a reduction of confusion and improved orientation to person, place, time and/or situation upon discharge, according to optimum baseline/ability  Outcome: Progressing  Goal: Interact with staff daily  Description: Interventions:  - Assess and re-assess patient's level of orientation  - Engage patient in 1 on 1 interactions, daily, for a minimum of 15 minutes   - Establish rapport/trust with patient   Outcome: Progressing  Goal: Express concerns related to confused thinking related to:  Description: Interventions:  - Encourage patient to express feelings, fears, frustrations, hopes  - Assign consistent caregivers   - Schuyler/re-orient patient as needed  - Allow comfort items, as appropriate  - Provide visual cues, signs, etc.   Outcome: Progressing  Goal: Allow medical examinations, as recommended  Description: Interventions:  - Provide physical/neurological exams and/or referrals, per provider   Outcome: Progressing  Goal: Cooperate with recommended testing/procedures  Description: Interventions:  - Determine need for ancillary testing  - Observe for mental status changes  - Implement falls/precaution protocol   Outcome: Progressing  Goal: Complete daily ADLs, including personal hygiene independently, as able  Description: Interventions:  - Observe, teach, and assist patient with ADLS  Outcome: Progressing     Problem: Individualized Interventions  Goal: Patient will verbalize appropriate use of telephone within 5 days  Description: Interventions:  - Treatment team to determine use of supervised phone privileges   Outcome: Progressing  Goal: Patient will verbalize need for hospitalization and will no longer attempt elopement within 5 days  Description: Interventions:  - Ongoing education to help patient understand need for hospitalization  Outcome: Progressing  Goal: Patient will recognize inappropriate behaviors and develop alternative behaviors within 5 days  Description: Interventions:  - Patient in collaboration with Treatment Team will develop a behavior management plan to help identify effective coping skills to deal with stressors  Outcome: Progressing

## 2023-07-25 NOTE — NURSING NOTE
Pt anxious at this time. Stating "I don't belong here" "enough games" "this experiment is over". Pt stating she is having periods of confusion. Provided with prn ativan 1 mg at 0115. HAM 26.
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
## 2023-07-25 NOTE — ASSESSMENT & PLAN NOTE
· We will continue to monitor her white blood cell count her elevated white blood cell count is felt to be from her IV steroids which have now been transitioned to prednisone Patrice  · Patient is afebrile without complaint to suggest any infectious process  · Chronic

## 2023-07-25 NOTE — NURSING NOTE
Pt admitted on a 201 from [address] [facility] ICU. According to chart review, pt originally admitted due to a "mast cell activation attack", which resulted in a seizure. As well as altered mental status. According to d/c summary:   "Appears to have had a tonic-clonic true seizure at home per  given elevated lactic acid suspect she did have true seizure -  likely given abrupt benzodiazepine withdrawal based on our conversation at bedside today, pt revealing she has been misusing benzos for 2-3 weeks prior to admission"    Pt with hx of mast cell activation syndrome, multiple sclerosis, psychogenic seizures, crohn's disease, bladder tumor, and acute encephalopathy. Pt reported SI while inpatient in ICU leading to admission to [facility] . Upon admission to , pt oriented to person, place [facility], [facility]) and time (able to state date and year). Pt appears confused during admission process, often answers questions indirectly. Pt reports she "took way too many" ativan. During assessment questions, pt often stated "it's all in the paper work". Pt brought papers with her, outlining her Mast Cell Activation Syndrome. Then stated, she "buried files and paperwork" r/t her condition "outside under the sand box, under the maple tree". Pt also repeats "find Ildefonso Duran", who appears to be a friend of the patients. Upon assessment of SI, pt denies and states, "I'm giving them up and letting them go"- referring to suicidal thoughts. When asked about hx of suicide attempts, pt reported "many". However, continued to repeat "I don't remember" to further questions. At this time, denied HI/AVH.

## 2023-07-25 NOTE — ASSESSMENT & PLAN NOTE
· Patient has a history of hypothyroidism   · TSH level pending  · patient will continue on her home dose of levothyroxine 100 mcg p.o. daily  · Patient will continue with Cytomel 5 mcg p.o. daily
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
## 2023-07-25 NOTE — ASSESSMENT & PLAN NOTE
· Currently patient has no abdominal pain nausea vomiting or diarrhea  · She has a history of Crohn's  · She will follow-up with an out patient PCP or GI  · She had a right upper quadrant ultrasound when she was an inpatient at [facility] prior to being discharged here with dilated CBD, MRCP without choledocholithiasis  · We will need to keep an eye on her bowel movements and make sure that is documented appropriate

## 2023-07-25 NOTE — TREATMENT TEAM
07/25/23 1155   Service   Service SLIM   Provider Name Anjel     Critical potassium level, provider made aware.

## 2023-07-25 NOTE — CONSULTS
[facility]  Consult  Name: Natasha Saeed 46 y.o. female I MRN: [id number]  Unit/Bed#: [facility] 417-54 I Date of Admission: 7/24/2023   Date of Service: 7/25/2023 I Hospital Day: 1    Inpatient consult for Medical Clearance for [facility] patient  Consult performed by: JONATHAN Matthews  Consult ordered by:  JONATHAN Bonilla          Assessment/Plan   Medical clearance for psychiatric admission  Assessment & Plan  · Vital signs stable afebrile patient seen and examined by myself all labs from [address] admission are still pending  · Patient medically cleared for admit  ·  IM will sign off please call with questions or concerns    GERD without esophagitis  Assessment & Plan  · Patient will continue with her Protonix 40 mg p.o. daily  · Patient will continue with Pepcid 40 mg p.o. daily    Seasonal allergies  Assessment & Plan  · Zyrtec 20 mg p.o. daily  · Patient will continue with Singulair 10 mg p.o. daily    Bladder tumor  Assessment & Plan  · Patient will follow-up with outpatient urology once discharged from the [address]  · Continue with urinary retention protocol if patient is unable to void    Seizure [facility])  Assessment & Plan  · Patient has a history of psychogenic seizures she had 1 episode while she was in the hospital at [facility]  · Appears to have had a tonic-clonic true seizure at home per  and given the fact that she Hello had an elevated lactate acid suspected she did have a seizure, questionable secondary to abrupt benzodiazepine withdraw patient had been using misusing benzos for approximately 2 to 3 weeks prior to admission  · She had an MRI of the brain without contrast at [facility] which was unremarkable her EEG was normal  · We will have seizure precautions in place  · She is to follow-up with an epileptologist outpatient in 6 weeks    Crohn's colitis ([address])  Assessment & Plan  · Currently patient has no abdominal pain nausea vomiting or diarrhea  · She has a history of Crohn's  · She will follow-up with an out patient PCP or GI  · She had a right upper quadrant ultrasound when she was an inpatient at [facility] prior to being discharged here with dilated CBD, MRCP without choledocholithiasis  · We will need to keep an eye on her bowel movements and make sure that is documented appropriate    Mast cell activation syndrome [facility])  Assessment & Plan  · Patient has a history of mast cell activation syndrome involved with mast cell activation specialist at [facility] [place] patient reports she had a "mast cell activation attack"  states patient became unresponsive extremities locked up for approximately 3 to 5 minutes reports this is how her syndrome typically presents and that she usually needs Benadryl steroids and IV fluids for treatment.   · Patient is currently at baseline  · She will be on prednisone 20 mg p.o. twice daily x3 days then 20 mg p.o. twice daily x1 day then back to her baseline of prednisone 20 mg p.o. daily  · She will have a as needed order for Benadryl 50 mg IM every 6 hours as needed itching or allergies next she will have an EpiPen that will be readily available on the unit  · Patient will continue with Xolair 150 mg subcu every 28 days    Leukocytosis  Assessment & Plan  · We will continue to monitor her white blood cell count her elevated white blood cell count is felt to be from her IV steroids which have now been transitioned to prednisone Patrice  · Patient is afebrile without complaint to suggest any infectious process  · Chronic    Acquired hypothyroidism  Assessment & Plan  · Patient has a history of hypothyroidism   · TSH level pending  · patient will continue on her home dose of levothyroxine 100 mcg p.o. daily  · Patient will continue with Cytomel 5 mcg p.o. daily    Headache  Assessment & Plan  · Patient has a history of migraines she takes Fioricet 1 tablet p.o. every 8 hours as needed for migraines    Hypokalemia  Assessment & Plan  · Patient's potassium level 2. 7  · Stat EKG was obtained it was normal  · Discussed case with Dr. Tyson Pratt  · Liquid potassium 40 mEq p.o. every 2 hours x3 doses  · Magnesium level normal 2.1  · Repeat BMP and magnesium tonight at 8 PM  · BMP and magnesium 7/26/2020 3 in the AM         Counseling / Coordination of Care Time: 1 hour. Greater than 50% of total time spent on patient counseling and coordination of care. Collaboration of Care: Were Recommendations Directly Discussed with Primary Treatment Team? - No     History of Present Illness:    Olive Brink is a 46 y.o. female who is originally admitted to the psychiatry service due to emotional lability with SI. We are consulted for medical clearance for admission to [facility] Unit and treatment of underlying psychiatric illness. Patient was admitted to [facility] on 7/18/2023 with seizure-like activity at home presents patient was treated with IV steroids and Benadryl with concern for mast cell activation on tach neuro was consulted she underwent a CT of the head MRI and EEG recommendations were to follow-up with hepatologist.  She developed right upper quadrant pain she had a right upper quadrant ultrasound revealing dilated common bile duct likely postsurgical but recommended MRCP for further evaluation, MRCP without choledocholithiasis. Her abdominal pain resolves questionable suspected to be is psychosomatic complaint she was found to have emotional lability during her stay and made passive suicidal ideations. At this time she also disclosed that she was using benzodiazepines and stopped abruptly likely the etiology of her seizure. She went underwent a psychiatric evaluation it was recommended that she do inpatient at the [facility]. She was also found to have leukocytosis which is thought to be secondary to IV steroids as well as a increase in her p.o. steroids there was no infectious etiology.   Past medical history is significant for fibromyalgia hypothyroidism mast cell activation syndrome bladder tumor Crohn's colitis seizures. On evaluation patient denies any physical complaints such as headache, dizziness, chest pain, shortness of breath, nausea/vomiting/diarrhea at this time. Inpatient admission for psychiatric evaluation. Labs ECG were reviewed. Review of Systems:    Review of Systems   Constitutional: Negative for chills and fever. HENT: Negative for ear pain and sore throat. Eyes: Negative for pain and visual disturbance. Respiratory: Negative for cough and shortness of breath. Cardiovascular: Negative for chest pain and palpitations. Gastrointestinal: Negative for abdominal pain and vomiting. Genitourinary: Negative for dysuria and hematuria. Musculoskeletal: Negative for arthralgias and back pain. Skin: Negative for color change and rash. Neurological: Positive for seizures. Negative for syncope. Psychiatric/Behavioral: Positive for agitation, behavioral problems and confusion. The patient is nervous/anxious. All other systems reviewed and are negative.       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)     NSTEMI. pt denies, documented in cardio note   • Pancreatitis     sphinger of    • Psychiatric disorder    • RA (rheumatoid arthritis) ([address])    • Seizures ([address])    • Ulcerative colitis ([address])    • Visual impairment        Past Surgical History:   Procedure Laterality Date   • ABDOMINAL SURGERY  2017   • APPENDECTOMY     • CHOLECYSTECTOMY     • COLONOSCOPY  2019   • CYSTOSCOPY N/A 6/8/2023    Procedure: CYSTOSCOPY, mini TURBT with fulgeration;  Surgeon: Cheli Magaña MD;  Location: [facility];  Service: Urology   • EGD AND COLONOSCOPY N/A 09/12/2017    Procedure: EGD AND COLONOSCOPY;  Surgeon: David Redmond MD;  Location: [facility]; Service: Gastroenterology   • ERCP N/A 09/15/2017    Procedure: ENDOSCOPIC RETROGRADE CHOLANGIOPANCREATOGRAPHY (ERCP); Surgeon: Rachelle Wadsworth MD;  Location: [facility]; Service: Gastroenterology   • HYSTERECTOMY     • KNEE SURGERY Right    • LAPAROSCOPIC ENDOMETRIOSIS FULGURATION     • ORIF ANKLE FRACTURE Left    • OR ARTHRS KNE SURG W/MENISCECTOMY MED/LAT W/SHVG Left 05/12/2017    Procedure: POSSIBLE MEDIAL MENISECTOMY;  Surgeon: Karen Calhoun MD;  Location: [facility];  Service: Orthopedics   • OR ARTHRS KNEE DEBRIDEMENT/SHAVING ARTCLR CRTLG Left 05/12/2017    Procedure: KNEE ARTHROSCOPY EVALUATION, CHONDROPLASTY;  Surgeon: Karen Calhoun MD;  Location: [facility];  Service: Orthopedics   • OR LAPS ABD PRTM&OMENTUM DX W/WO SPEC BR/WA SPX N/A 12/12/2017    Procedure: LAPAROSCOPY DIAGNOSTIC  WITH LYSIS OF ADHESIONS;  Surgeon: Nannette Miranda DO;  Location:  MAIN OR;  Service: General   • UPPER GASTROINTESTINAL ENDOSCOPY  2019       Meds/Allergies:    all medications and allergies reviewed    Allergies:    Allergies   Allergen Reactions   • Aimovig [Erenumab-Aooe] Anaphylaxis   • Amitriptyline Anaphylaxis     According to the patient she had nphylaxis   • Aspergillus Allergy Skin Test Other (See Comments), Hives and Swelling   • Azathioprine Other (See Comments) and Anaphylaxis     pancreatitis  According to patient she needed Epipen   • Banana - Food Allergy Itching, Swelling and Anaphylaxis   • Buspar [Buspirone] Hives and Shortness Of Breath   • Citric Acid-Polysorbate 80 Abdominal Pain, Anaphylaxis, Anxiety, Bradycardia, Diarrhea, Fatigue, GI Intolerance, Headache, Hives, Hyperactivity, Itching, Lip Swelling, Nausea Only, Palpitations, Rash, Shortness Of Breath, Tachycardia, Throat Swelling, Tongue Swelling and Vomiting   • Corn Dextrin Anaphylaxis     Any corn based products   • Eggs Or Egg-Derived Products - Food Allergy Anaphylaxis   • Epinephrine Anaphylaxis   • Erenumab Anaphylaxis     patient sent portal message stating she had anaphylaxis  patient sent portal message stating she had anaphylaxis     • Gadolinium Abdominal Pain, Anaphylaxis, Anxiety, Confusion, Delirium, Dizziness, Eye Swelling, Fatigue, GI Intolerance, Headache, Hives, Irritability, Itching, Lip Swelling, Nausea Only, Palpitations, Photosensitivity, Rash, Seizures, Shortness Of Breath, Swelling, Syncope, Throat Swelling, Tongue Swelling, Tremor, Visual Disturbance and Vomiting   • Gelatin - Food Allergy Anaphylaxis   • Heparin Hives     Painful welts    • Lactated Ringers Shortness Of Breath     Pt questions this allergy, pt has received LR multiple times without reaction   • Lavender Oil Anaphylaxis     Other reaction(s): anaphalxis   • Malto Dextrin [Dextrin] Anaphylaxis   • Other Anaphylaxis     Gel caps, maltodextrose, dextrose,grapes, lavender    • Penicillium Notatum Shortness Of Breath and Swelling   • Sumatriptan Anaphylaxis     Bradycardia, sob, back pressure, head pain,throat tightness  According to the patient she had anphylaxis   • Ustekinumab Anaphylaxis   • Contrast [Iodinated Contrast Media] Other (See Comments)     Pt states needs to be premedicated prior to injection   • Corn Oil - Food Allergy - Food Allergy Hives   • Humira [Adalimumab] Other (See Comments)     "I develop drug induced lupus"   • Ibuprofen Hives and Throat Swelling   • Polyethylene Glycol Diarrhea and Vomiting   • Red Dye - Food Allergy Other (See Comments)     intolerance   • Remicade [Infliximab] Other (See Comments)     "I develop drug induced lupus"   • Soy Allergy - Food Allergy Other (See Comments)   • Sulfa Antibiotics Hives and Other (See Comments)   • Sulfate Hives   • Sulfites - Food Allergy Hives   • Chlorhexidine Itching   • Freederm Adhesive Remover [New Skin] Rash   • Histamine Hives, Rash and Other (See Comments)     Intolerance         Social History:     Marital Status: /Civil Union    Substance Use History:   Social History     Substance and Sexual Activity   Alcohol Use Never     Social History     Tobacco Use   Smoking Status Never   Smokeless Tobacco Never     Social History     Substance and Sexual Activity   Drug Use Never       Family History:    non-contributory    Physical Exam:     Vitals:   Blood Pressure: 108/78 (07/25/23 0810)  Pulse: 97 (07/25/23 0810)  Temperature: 97.7 °F (36.5 °C) (07/25/23 0810)  Temp Source: Temporal (07/25/23 0810)  Respirations: 18 (07/25/23 0810)  Height: 5' 5" (165.1 cm) (07/24/23 2000)  Weight - Scale: 65.9 kg (145 lb 3.2 oz) (07/25/23 0939)  SpO2: 99 % (07/25/23 0810)    Physical Exam  Constitutional:       General: She is not in acute distress. Appearance: Normal appearance. She is not ill-appearing. HENT:      Head: Normocephalic and atraumatic. Nose: Nose normal.      Mouth/Throat:      Mouth: Mucous membranes are moist.   Eyes:      Extraocular Movements: Extraocular movements intact. Cardiovascular:      Rate and Rhythm: Normal rate and regular rhythm. Heart sounds: Normal heart sounds. Pulmonary:      Breath sounds: Normal breath sounds. No wheezing. Abdominal:      General: Abdomen is flat. Bowel sounds are normal.      Palpations: Abdomen is soft. Skin:     General: Skin is warm and dry. Neurological:      Mental Status: She is alert.    Psychiatric:      Comments: Unable to tell me where she lives, she knows she's in [place] she's alert to daytime and city  not necessarily place         Additional Data:     Lab Results: I have personally reviewed pertinent reports. Results from last 7 days   Lab Units 07/25/23  1007   WBC Thousand/uL 14.83*   HEMOGLOBIN g/dL 15.1   HEMATOCRIT % 47.0*   PLATELETS Thousands/uL 642*   NEUTROS PCT % 72   LYMPHS PCT % 17   MONOS PCT % 8   EOS PCT % 1     Results from last 7 days   Lab Units 07/25/23  1007   SODIUM mmol/L 141   POTASSIUM mmol/L 2.7*   CHLORIDE mmol/L 98   CO2 mmol/L 25   BUN mg/dL 8   CREATININE mg/dL 0.81   ANION GAP mmol/L 18   CALCIUM mg/dL 9.8   ALBUMIN g/dL 4.4   TOTAL BILIRUBIN mg/dL 0.55   ALK PHOS U/L 55   ALT U/L 39   AST U/L 22   GLUCOSE RANDOM mg/dL 121             Lab Results   Component Value Date/Time    HGBA1C 5.4 09/01/2022 04:09 PM    HGBA1C 5.4 04/27/2022 12:00 AM    HGBA1C 5.1 03/12/2022 09:30 AM    HGBA1C 5.3 03/04/2022 07:09 PM           EKG, Pathology, and Other Studies Reviewed on Admission:   EKG 7/18/2023 EKG strip reviewed by myself in agreement with the assessment ventricular rate 90 QTc 455 normal sinus rhythm normal ECG when compared with December 3, 2021 there are no new acute EKG change findings noted    I have spent a total time of 60 minutes on 07/25/23 in caring for this patient including Patient and family education, Importance of tx compliance, Impressions, Counseling / Coordination of care, Documenting in the medical record, Reviewing / ordering tests, medicine, procedures   and Obtaining or reviewing history  . ** Please Note: This note has been constructed using a voice recognition system.  **
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
## 2023-07-25 NOTE — ASSESSMENT & PLAN NOTE
· Patient's potassium level 2.7  · Stat EKG was obtained it was normal  · Discussed case with Dr. Jozef Robledo  · Liquid potassium 40 mEq p.o. every 2 hours x3 doses  · Magnesium level normal 2.1  · Repeat BMP and magnesium tonight at 8 PM  · BMP and magnesium 7/26/2020 3 in the AM

## 2023-07-25 NOTE — ASSESSMENT & PLAN NOTE
· Vital signs stable afebrile patient seen and examined by myself all labs from [facility]  admission are still pending  · Patient medically cleared for admit  · SL IM will sign off please call with questions or concerns

## 2023-07-25 NOTE — TREATMENT TEAM
07/25/23 1226   Service   Service SLIM   Provider Name Luke     Abnormal ECG. Provider made aware. Advised to give 40mEq of PO liquid potassium.

## 2023-07-25 NOTE — TREATMENT PLAN
TREATMENT PLAN REVIEW - [address] [place] [address] Rd Kwabena Crespo 46 y.o. 1975 female MRN: [id number]    [facility] Room / Bed: [place] [address]/OABHU 441-43 Encounter: [id number]          Admit Date/Time:  7/24/2023  7:50 PM    Treatment Team: Attending Provider: Tyson Reyes MD; Patient Care Assistant: Chary Garcia; Occupational Therapy Assistant: Buelah Leventhal;  Registered Nurse: Antionette Dudley RN; Patient Care Assistant: Raiza Dennison; Patient Care Assistant: Cassie Mayberry    Diagnosis: Principal Problem:    Major depressive disorder with psychotic features ([address])  Active Problems:    Hypokalemia    Headache    Generalized anxiety disorder    Acquired hypothyroidism    Leukocytosis    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HCC)    Crohn's colitis ([address])    Seizure ([address])    Bladder tumor    Encephalopathy chronic    Medical clearance for psychiatric admission    Seasonal allergies    GERD without esophagitis    Benzodiazepine misuse      Patient Strengths/Assets: ability for insight, cooperative, family ties, motivated, negotiates basic needs, patient is on a voluntary commitment, patient is willing to work on problems, supportive family/friends    Patient Barriers/Limitations: difficulty adapting, impaired cognition, poor reasoning ability, poor self-care, resistance to treatment, self-care deficit    Short Term Goals: decrease in depressive symptoms, decrease in anxiety symptoms, decrease in psychotic symptoms, decrease in suicidal thoughts, improvement in reality testing, improvement in reasoning ability, improvement in self care, sleep improvement    Long Term Goals: improvement in depression, improvement in anxiety, resolution of depressive symptoms, free of suicidal thoughts, able to express basic needs, acceptance of need for psychiatric medications, acceptance of need for psychiatric treatment, acceptance of need for psychiatric follow up after discharge, acceptance of psychiatric diagnosis, adequate self care, adequate sleep, adequate appetite, adequate oral intake    Progress Towards Goals: starting psychiatric medications as prescribed    Recommended Treatment: medication management, patient medication education, group therapy, milieu therapy, continued Behavioral Health psychiatric evaluation/assessment process    Treatment Frequency: daily medication monitoring, group and milieu therapy daily, monitoring through interdisciplinary rounds, monitoring through weekly patient care conferences    Expected Discharge Date:   to be determined    Discharge Plan: discharge to family care, discharge to home, referrals as indicated    Treatment Plan Created/Updated By: Irina Bains MD

## 2023-07-25 NOTE — ASSESSMENT & PLAN NOTE
· Patient has a history of psychogenic seizures she had 1 episode while she was in the hospital at [facility]  · Appears to have had a tonic-clonic true seizure at home per  and given the fact that she Hello had an elevated lactate acid suspected she did have a seizure, questionable secondary to abrupt benzodiazepine withdraw patient had been using misusing benzos for approximately 2 to 3 weeks prior to admission  · She had an MRI of the brain without contrast at [facility] which was unremarkable her EEG was normal  · We will have seizure precautions in place  · She is to follow-up with an epileptologist outpatient in 6 weeks

## 2023-07-25 NOTE — ASSESSMENT & PLAN NOTE
· Patient has a history of migraines she takes Fioricet 1 tablet p.o. every 8 hours as needed for migraines

## 2023-07-25 NOTE — PROGRESS NOTES
07/25/23 1015 07/25/23 1100 07/25/23 1330   Activity/Group Checklist   Group Community meeting  (topic:focus) Wellness  (progressive muscle relaxation.) Life Skills  (social bingo)   Attendance Attended Attended Attended   Attendance Duration (min) 31-45 16-30 46-60   Interactions Other (Comment) Other (Comment)  (Pt. held up group initially due to questioning nursed about her medications,eventually joined the relaxation prompts  before being excused to meet with the unit ) Interacted appropriately  (yet needed extra time to answer some of the game questions but did so with progressing social interaction and response time. Khadar Hewitt )   Affect/Mood Constricted Calm Constricted   Goals Achieved Able to listen to others Able to listen to others; Able to engage in interactions Able to engage in interactions; Able to listen to others
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
## 2023-07-25 NOTE — ASSESSMENT & PLAN NOTE
CLINICAL NUTRITION SERVICES - EDUCATION NOTE    Received diet education consult for Heart Healthy diet after CV surgery     NUTRITION HISTORY:  Pt reports that he and his wife eat a healthy diet to control their blood sugars.  They have no soda or snack food in the house and they do not use salt.  [facility].     ?  NUTRITION DIAGNOSIS:  Food- and nutrition-related knowledge deficit related to heart disease as evidenced by need for therapeutic diet      INTERVENTIONS:  Provided review on the heart healthy diet.  Pt interested in more material for diabetic diet.    Provided  the following handouts: Heart Healthy Label Reading Tips, Heart Healthy Nutrition Therapy, 20 ways to add fruits and vegetables.    Goals:  Patient demonstrates knowledge of the heart healthy diet.    Follow Up:  Patient to ask any further nutrition-related questions before discharge.  Will provide further diabetic diet materials later today or tomorrow.   Additional diet education is a part of the cardiac rehab program.             · Patient has a history of mast cell activation syndrome involved with mast cell activation specialist at [facility] [place] patient reports she had a "mast cell activation attack"  states patient became unresponsive extremities locked up for approximately 3 to 5 minutes reports this is how her syndrome typically presents and that she usually needs Benadryl steroids and IV fluids for treatment.   · Patient is currently at baseline  · She will be on prednisone 20 mg p.o. twice daily x3 days then 20 mg p.o. twice daily x1 day then back to her baseline of prednisone 20 mg p.o. daily  · She will have a as needed order for Benadryl 50 mg IM every 6 hours as needed itching or allergies next she will have an EpiPen that will be readily available on the unit  · Patient will continue with Xolair 150 mg subcu every 28 days

## 2023-07-25 NOTE — UTILIZATION REVIEW
NOTIFICATION OF ADMISSION DISCHARGE   This is a Notification of Discharge from [facility]. Please be advised that this patient has been discharge from our facility. Below you will find the admission and discharge date and time including the patient’s disposition. UTILIZATION REVIEW CONTACT:  Kate Mitchell  Utilization   Network Utilization Review Department  Phone: [phone] x carefully listen to the prompts. All voicemails are confidential.  Email: [email or web address]     ADMISSION INFORMATION  PRESENTATION DATE: 7/18/2023  5:13 PM  OBERVATION ADMISSION DATE:   INPATIENT ADMISSION DATE: 7/20/23  1:10 PM   DISCHARGE DATE: 7/24/2023  6:45 PM   DISPOSITION:[facility]    IMPORTANT INFORMATION:  Send all requests for admission clinical reviews, approved or denied determinations and any other requests to dedicated fax number below belonging to the campus where the patient is receiving treatment.  List of dedicated fax numbers:  [place] DENIALS (Administrative/Medical Necessity) [phone] [address] [facility] (Maternity/NICU/Pediatrics) [phone]   [facility] [phone]   [facility] [phone] [address] [facility] [phone]   [address] [place] [address] [phone]   [facility] [phone]   [address] [place] [address] [phone]   [address] [place] [address] [phone] [address] [facility] [phone] [address] [facility] [address] [facility] [phone]

## 2023-07-25 NOTE — ASSESSMENT & PLAN NOTE
· Patient will continue with her Protonix 40 mg p.o. daily  · Patient will continue with Pepcid 40 mg p.o. daily

## 2023-07-26 ENCOUNTER — HOSPITAL ENCOUNTER (OUTPATIENT)
Dept: INFUSION CENTER | Facility: HOSPITAL | Age: 48
Discharge: HOME | End: 2023-07-26
Attending: INTERNAL MEDICINE

## 2023-07-26 LAB
ANION GAP SERPL CALCULATED.3IONS-SCNC: 8 MMOL/L
BUN SERPL-MCNC: 9 MG/DL (ref 5–25)
CALCIUM SERPL-MCNC: 8.9 MG/DL (ref 8.4–10.2)
CHLORIDE SERPL-SCNC: 103 MMOL/L (ref 96–108)
CO2 SERPL-SCNC: 28 MMOL/L (ref 21–32)
CREAT SERPL-MCNC: 0.49 MG/DL (ref 0.6–1.3)
GFR SERPL CREATININE-BSD FRML MDRD: 113 ML/MIN/1.73SQ M
GLUCOSE P FAST SERPL-MCNC: 116 MG/DL (ref 65–99)
GLUCOSE SERPL-MCNC: 116 MG/DL (ref 65–140)
GLUCOSE SERPL-MCNC: 152 MG/DL (ref 65–140)
GLUCOSE SERPL-MCNC: 156 MG/DL (ref 65–140)
MAGNESIUM SERPL-MCNC: 2.1 MG/DL (ref 1.9–2.7)
POTASSIUM SERPL-SCNC: 3.7 MMOL/L (ref 3.5–5.3)
SODIUM SERPL-SCNC: 139 MMOL/L (ref 135–147)

## 2023-07-26 PROCEDURE — 80048 BASIC METABOLIC PNL TOTAL CA: CPT | Performed by: NURSE PRACTITIONER

## 2023-07-26 PROCEDURE — 83735 ASSAY OF MAGNESIUM: CPT | Performed by: NURSE PRACTITIONER

## 2023-07-26 PROCEDURE — 99232 SBSQ HOSP IP/OBS MODERATE 35: CPT | Performed by: STUDENT IN AN ORGANIZED HEALTH CARE EDUCATION/TRAINING PROGRAM

## 2023-07-26 PROCEDURE — 82948 REAGENT STRIP/BLOOD GLUCOSE: CPT

## 2023-07-26 PROCEDURE — NC001 PR NO CHARGE: Performed by: STUDENT IN AN ORGANIZED HEALTH CARE EDUCATION/TRAINING PROGRAM

## 2023-07-26 RX ORDER — ARIPIPRAZOLE 5 MG/1
5 TABLET ORAL DAILY
Status: DISCONTINUED | OUTPATIENT
Start: 2023-07-27 | End: 2023-07-27

## 2023-07-26 RX ORDER — MELATONIN
1000 DAILY
Status: DISCONTINUED | OUTPATIENT
Start: 2023-07-26 | End: 2023-08-15 | Stop reason: HOSPADM

## 2023-07-26 RX ADMIN — MONTELUKAST 10 MG: 10 TABLET, FILM COATED ORAL at 21:25

## 2023-07-26 RX ADMIN — FAMOTIDINE 40 MG: 20 TABLET ORAL at 08:44

## 2023-07-26 RX ADMIN — SERTRALINE HYDROCHLORIDE 25 MG: 25 TABLET ORAL at 08:48

## 2023-07-26 RX ADMIN — PREDNISONE 20 MG: 20 TABLET ORAL at 08:44

## 2023-07-26 RX ADMIN — PREDNISONE 20 MG: 20 TABLET ORAL at 17:12

## 2023-07-26 RX ADMIN — LEVOTHYROXINE SODIUM 100 MCG: 100 TABLET ORAL at 06:18

## 2023-07-26 RX ADMIN — PANTOPRAZOLE SODIUM 40 MG: 40 TABLET, DELAYED RELEASE ORAL at 06:18

## 2023-07-26 RX ADMIN — LORAZEPAM 0.5 MG: 0.5 TABLET ORAL at 16:41

## 2023-07-26 RX ADMIN — CHOLECALCIFEROL TAB 25 MCG (1000 UNIT) 1000 UNITS: 25 TAB at 08:44

## 2023-07-26 RX ADMIN — ARIPIPRAZOLE 2 MG: 2 TABLET ORAL at 08:43

## 2023-07-26 RX ADMIN — HALOPERIDOL LACTATE 5 MG: 5 INJECTION, SOLUTION INTRAMUSCULAR at 01:52

## 2023-07-26 RX ADMIN — LIOTHYRONINE SODIUM 5 MCG: 5 TABLET ORAL at 08:49

## 2023-07-26 NOTE — PLAN OF CARE
Problem: Alteration in Thoughts and Perception  Goal: Treatment Goal: Gain control of psychotic behaviors/thinking, reduce/eliminate presenting symptoms and demonstrate improved reality functioning upon discharge  Outcome: Progressing  Goal: Verbalize thoughts and feelings  Description: Interventions:  - Promote a nonjudgmental and trusting relationship with the patient through active listening and therapeutic communication  - Assess patient's level of functioning, behavior and potential for risk  - Engage patient in 1 on 1 interactions  - Encourage patient to express fears, feelings, frustrations, and discuss symptoms    - [place] patient to reality, help patient recognize reality-based thinking   - Administer medications as ordered and assess for potential side effects  - Provide the patient education related to the signs and symptoms of the illness and desired effects of prescribed medications  Outcome: Progressing  Goal: Refrain from acting on delusional thinking/internal stimuli  Description: Interventions:  - Monitor patient closely, per order   - Utilize least restrictive measures   - Set reasonable limits, give positive feedback for acceptable   - Administer medications as ordered and monitor of potential side effects  Outcome: Progressing  Goal: Agree to be compliant with medication regime, as prescribed and report medication side effects  Description: Interventions:  - Offer appropriate PRN medication and supervise ingestion; conduct AIMS, as needed   Outcome: Progressing  Goal: Recognize dysfunctional thoughts, communicate reality-based thoughts at the time of discharge  Description: Interventions:  - Provide medication and psycho-education to assist patient in compliance and developing insight into his/her illness   Outcome: Progressing  Goal: Complete daily ADLs, including personal hygiene independently, as able  Description: Interventions:  - Observe, teach, and assist patient with ADLS  - Monitor and promote a balance of rest/activity, with adequate nutrition and elimination   Outcome: Progressing     Problem: Ineffective Coping  Goal: Cooperates with admission process  Description: Interventions:   - Complete admission process  Outcome: Progressing  Goal: Identifies ineffective coping skills  Outcome: Progressing  Goal: Identifies healthy coping skills  Outcome: Progressing  Goal: Demonstrates healthy coping skills  Outcome: Progressing  Goal: Patient/Family participate in treatment and DC plans  Description: Interventions:  - Provide therapeutic environment  Outcome: Progressing  Goal: Patient/Family verbalizes awareness of resources  Outcome: Progressing     Problem: Risk for Self Injury/Neglect  Goal: Treatment Goal: Remain safe during length of stay, learn and adopt new coping skills, and be free of self-injurious ideation, impulses and acts at the time of discharge  Outcome: Progressing  Goal: Verbalize thoughts and feelings  Description: Interventions:  - Assess and re-assess patient's lethality and potential for self-injury  - Engage patient in 1:1 interactions, daily, for a minimum of 15 minutes  - Encourage patient to express feelings, fears, frustrations, hopes  - Establish rapport/trust with patient   Outcome: Progressing  Goal: Refrain from harming self  Description: Interventions:  - Monitor patient closely, per order  - Develop a trusting relationship  - Supervise medication ingestion, monitor effects and side effects   Outcome: Progressing  Goal: Recognize maladaptive responses and adopt new coping mechanisms  Outcome: Progressing  Goal: Complete daily ADLs, including personal hygiene independently, as able  Description: Interventions:  - Observe, teach, and assist patient with ADLS  - Monitor and promote a balance of rest/activity, with adequate nutrition and elimination  Outcome: Progressing     Problem: Depression  Goal: Treatment Goal: Demonstrate behavioral control of depressive symptoms, verbalize feelings of improved mood/affect, and adopt new coping skills prior to discharge  Outcome: Progressing  Goal: Verbalize thoughts and feelings  Description: Interventions:  - Assess and re-assess patient's level of risk   - Engage patient in 1:1 interactions, daily, for a minimum of 15 minutes   - Encourage patient to express feelings, fears, frustrations, hopes   Outcome: Progressing  Goal: Refrain from harming self  Description: Interventions:  - Monitor patient closely, per order   - Supervise medication ingestion, monitor effects and side effects   Outcome: Progressing  Goal: Refrain from isolation  Description: Interventions:  - Develop a trusting relationship   - Encourage socialization   Outcome: Progressing  Goal: Refrain from self-neglect  Outcome: Progressing  Goal: Complete daily ADLs, including personal hygiene independently, as able  Description: Interventions:  - Observe, teach, and assist patient with ADLS  -  Monitor and promote a balance of rest/activity, with adequate nutrition and elimination   Outcome: Progressing     Problem: Anxiety  Goal: Anxiety is at manageable level  Description: Interventions:  - Assess and monitor patient's anxiety level. - Monitor for signs and symptoms (heart palpitations, chest pain, shortness of breath, headaches, nausea, feeling jumpy, restlessness, irritable, apprehensive). - Collaborate with interdisciplinary team and initiate plan and interventions as ordered.   - [place] patient to unit/surroundings  - Explain treatment plan  - Encourage participation in care  - Encourage verbalization of concerns/fears  - Identify coping mechanisms  - Assist in developing anxiety-reducing skills  - Administer/offer alternative therapies  - Limit or eliminate stimulants  Outcome: Progressing     Problem: Risk for Violence/Aggression Toward Others  Goal: Treatment Goal: Refrain from acts of violence/aggression during length of stay, and demonstrate improved impulse control at the time of discharge  Outcome: Progressing  Goal: Verbalize thoughts and feelings  Description: Interventions:  - Assess and re-assess patient's level of risk, every waking shift  - Engage patient in 1:1 interactions, daily, for a minimum of 15 minutes   - Allow patient to express feelings and frustrations in a safe and non-threatening manner   - Establish rapport/trust with patient   Outcome: Progressing  Goal: Refrain from harming others  Outcome: Progressing  Goal: Refrain from destructive acts on the environment or property  Outcome: Progressing  Goal: Control angry outbursts  Description: Interventions:  - Monitor patient closely, per order  - Ensure early verbal de-escalation  - Monitor prn medication needs  - Set reasonable/therapeutic limits, outline behavioral expectations, and consequences   - Provide a non-threatening milieu, utilizing the least restrictive interventions   Outcome: Progressing  Goal: Identify appropriate positive anger management techniques  Description: Interventions:  - Offer anger management and coping skills groups   - Staff will provide positive feedback for appropriate anger control  Outcome: Progressing     Problem: Alteration in Orientation  Goal: Treatment Goal: Demonstrate a reduction of confusion and improved orientation to person, place, time and/or situation upon discharge, according to optimum baseline/ability  Outcome: Progressing  Goal: Interact with staff daily  Description: Interventions:  - Assess and re-assess patient's level of orientation  - Engage patient in 1 on 1 interactions, daily, for a minimum of 15 minutes   - Establish rapport/trust with patient   Outcome: Progressing  Goal: Express concerns related to confused thinking related to:  Description: Interventions:  - Encourage patient to express feelings, fears, frustrations, hopes  - Assign consistent caregivers   - [place]/re-orient patient as needed  - Allow comfort items, as appropriate  - Provide visual cues, signs, etc.   Outcome: Progressing  Goal: Complete daily ADLs, including personal hygiene independently, as able  Description: Interventions:  - Observe, teach, and assist patient with ADLS  Outcome: Progressing     Problem: Individualized Interventions  Goal: Patient will verbalize appropriate use of telephone within 5 days  Description: Interventions:  - Treatment team to determine use of supervised phone privileges   Outcome: Progressing  Goal: Patient will verbalize need for hospitalization and will no longer attempt elopement within 5 days  Description: Interventions:  - Ongoing education to help patient understand need for hospitalization  Outcome: Progressing  Goal: Patient will recognize inappropriate behaviors and develop alternative behaviors within 5 days  Description: Interventions:  - Patient in collaboration with Treatment Team will develop a behavior management plan to help identify effective coping skills to deal with stressors  Outcome: Progressing

## 2023-07-26 NOTE — NURSING NOTE
Patient was visible most this shift in dayroom. Patient is med compliant. Patient maintained on 1:1 while awake. Patient's appetite is fair. No issues or distress noted.  Continual rounding and 1:1 in place

## 2023-07-26 NOTE — NURSING NOTE
Patient visible but a bit withdrawn. Patient remains on 1:1 while awake. Patient reported feeling clearer mentally. Patient reported feeling a "bit better than yesterday" Patient med compliant. VSS,.  Continual rounding and 1:1 maintained

## 2023-07-26 NOTE — PROGRESS NOTES
Progress Note - Behavioral Health   Olive Brink 46 y.o. female MRN: [id number]  Unit/Bed#: [facility] 654-32 Encounter: [id number]    The patient was seen for continuing care and reviewed with treatment team. Per RN RRT called yesterday due to psychogenic seizure, Observed by 1;1. Pt was given Haldol 5mg IM with good result. This morning pt appears brighter, she is more enthusiastic about current medication regimen, she feels more Optimistic" reports she is trying to  figure out what is real from what is unreal. Still reports AH of sounds and noises but reports these are less intense. Sleep- fair but improving   Appetite - Improving   Energy: fair  No suicidal or homicidal ideations. No EPS, denies any side effects of medication. /96 (BP Location: Right arm)   Pulse 105   Temp (!) 97.4 °F (36.3 °C) (Temporal)   Resp 17   Ht 5' 5" (1.651 m)   Wt 65.9 kg (145 lb 3.2 oz)   SpO2 97%   BMI 24.16 kg/m²     Current Mental Status Evaluation:  Appearance:  Adequate hygiene and grooming   Behavior:  Calm, Cooperative, Evasive and Superficial   Mood:  anxious and improving   Affect: inappropriate   Speech: Soft and Slow   Thought Process:  Circumstantial and Tangential at times   Thought Content:  Does not verbalize delusional material   Perceptual Disturbances:  Auditory hallucinations without commands, sounds, noises, steps   Risk Potential: No suicidal or homicidal ideation   Orientation:   Patient is alert, awake and oriented x 3   Patient has limited insight into her illness    Recent Results (from the past 72 hour(s))   Urinalysis with microscopic    Collection Time: 07/23/23  5:23 PM   Result Value Ref Range    Color, UA Yellow     Clarity, UA Clear     Specific Gravity, UA <=1.005 1.003 - 1.030    pH, UA 7.0 4.5, 5.0, 5.5, 6.0, 6.5, 7.0, 7.5, 8.0    Leukocytes, UA Negative Negative    Nitrite, UA Negative Negative    Protein, UA Negative Negative mg/dl    Glucose, UA Negative Negative mg/dl    Ketones, UA Trace (A) Negative mg/dl    Urobilinogen, UA 0.2 0.2, 1.0 E.U./dl E.U./dl    Bilirubin, UA Negative Negative    Occult Blood, UA Trace-Intact (A) Negative    RBC, UA 0-1 (A) None Seen, 2-4 /hpf    WBC, UA None Seen None Seen, 2-4, 5-60 /hpf    Epithelial Cells Occasional None Seen, Occasional /hpf    Bacteria, UA None Seen None Seen, Occasional /hpf   CBC and differential    Collection Time: 07/24/23 11:35 AM   Result Value Ref Range    WBC 15.60 (H) 4.31 - 10.16 Thousand/uL    RBC 5.24 (H) 3.81 - 5.12 Million/uL    Hemoglobin 15.8 (H) 11.5 - 15.4 g/dL    Hematocrit 47.9 (H) 34.8 - 46.1 %    MCV 91 82 - 98 fL    MCH 30.2 26.8 - 34.3 pg    MCHC 33.0 31.4 - 37.4 g/dL    RDW 15.0 11.6 - 15.1 %    MPV 9.6 8.9 - 12.7 fL    Platelets 083 (H) 580 - 390 Thousands/uL    nRBC 0 /100 WBCs    Neutrophils Relative 86 (H) 43 - 75 %    Immat GRANS % 1 0 - 2 %    Lymphocytes Relative 8 (L) 14 - 44 %    Monocytes Relative 5 4 - 12 %    Eosinophils Relative 0 0 - 6 %    Basophils Relative 0 0 - 1 %    Neutrophils Absolute 13.37 (H) 1.85 - 7.62 Thousands/µL    Immature Grans Absolute 0.11 0.00 - 0.20 Thousand/uL    Lymphocytes Absolute 1.31 0.60 - 4.47 Thousands/µL    Monocytes Absolute 0.76 0.17 - 1.22 Thousand/µL    Eosinophils Absolute 0.02 0.00 - 0.61 Thousand/µL    Basophils Absolute 0.03 0.00 - 0.10 Thousands/µL   COVID only    Collection Time: 07/24/23  2:25 PM    Specimen: Nose; Nares   Result Value Ref Range    SARS-CoV-2 Negative Negative   Comprehensive metabolic panel    Collection Time: 07/25/23 10:07 AM   Result Value Ref Range    Sodium 141 135 - 147 mmol/L    Potassium 2.7 (LL) 3.5 - 5.3 mmol/L    Chloride 98 96 - 108 mmol/L    CO2 25 21 - 32 mmol/L    ANION GAP 18 mmol/L    BUN 8 5 - 25 mg/dL    Creatinine 0.81 0.60 - 1.30 mg/dL    Glucose 121 65 - 140 mg/dL    Calcium 9.8 8.4 - 10.2 mg/dL    AST 22 13 - 39 U/L    ALT 39 7 - 52 U/L    Alkaline Phosphatase 55 34 - 104 U/L    Total Protein 7.4 6.4 - 8.4 g/dL Albumin 4.4 3.5 - 5.0 g/dL    Total Bilirubin 0.55 0.20 - 1.00 mg/dL    eGFR 84 ml/min/1.73sq m   Magnesium    Collection Time: 07/25/23 10:07 AM   Result Value Ref Range    Magnesium 2.1 1.9 - 2.7 mg/dL   Phosphorus    Collection Time: 07/25/23 10:07 AM   Result Value Ref Range    Phosphorus 3.1 2.7 - 4.5 mg/dL   CBC and differential    Collection Time: 07/25/23 10:07 AM   Result Value Ref Range    WBC 14.83 (H) 4.31 - 10.16 Thousand/uL    RBC 5.16 (H) 3.81 - 5.12 Million/uL    Hemoglobin 15.1 11.5 - 15.4 g/dL    Hematocrit 47.0 (H) 34.8 - 46.1 %    MCV 91 82 - 98 fL    MCH 29.3 26.8 - 34.3 pg    MCHC 32.1 31.4 - 37.4 g/dL    RDW 15.3 (H) 11.6 - 15.1 %    MPV 9.0 8.9 - 12.7 fL    Platelets 277 (H) 725 - 390 Thousands/uL    nRBC 0 /100 WBCs    Neutrophils Relative 72 43 - 75 %    Immat GRANS % 1 0 - 2 %    Lymphocytes Relative 17 14 - 44 %    Monocytes Relative 8 4 - 12 %    Eosinophils Relative 1 0 - 6 %    Basophils Relative 1 0 - 1 %    Neutrophils Absolute 10.80 (H) 1.85 - 7.62 Thousands/µL    Immature Grans Absolute 0.11 0.00 - 0.20 Thousand/uL    Lymphocytes Absolute 2.57 0.60 - 4.47 Thousands/µL    Monocytes Absolute 1.19 0.17 - 1.22 Thousand/µL    Eosinophils Absolute 0.09 0.00 - 0.61 Thousand/µL    Basophils Absolute 0.07 0.00 - 0.10 Thousands/µL   Vitamin B12    Collection Time: 07/25/23 10:07 AM   Result Value Ref Range    Vitamin B-12 1,323 (H) 180 - 914 pg/mL   Folate    Collection Time: 07/25/23 10:07 AM   Result Value Ref Range    Folate 21.2 >5.9 ng/mL   Vitamin D 25 hydroxy    Collection Time: 07/25/23 10:07 AM   Result Value Ref Range    Vit D, 25-Hydroxy 19.6 (L) 30.0 - 100.0 ng/mL   ECG 12 lead    Collection Time: 07/25/23 11:07 AM   Result Value Ref Range    Ventricular Rate 99 BPM    Atrial Rate 99 BPM    MO Interval 118 ms    QRSD Interval 86 ms    QT Interval 538 ms    QTC Interval 690 ms    P [place] 33 degrees    QRS Axis 21 degrees    T Wave Axis 54 degrees   Basic metabolic panel Collection Time: 07/25/23  8:16 PM   Result Value Ref Range    Sodium 138 135 - 147 mmol/L    Potassium 5.4 (H) 3.5 - 5.3 mmol/L    Chloride 105 96 - 108 mmol/L    CO2 21 21 - 32 mmol/L    ANION GAP 12 mmol/L    BUN 10 5 - 25 mg/dL    Creatinine 0.64 0.60 - 1.30 mg/dL    Glucose 133 65 - 140 mg/dL    Calcium 9.5 8.4 - 10.2 mg/dL    eGFR 104 ml/min/1.73sq m   Magnesium    Collection Time: 07/25/23  8:16 PM   Result Value Ref Range    Magnesium 2.3 1.9 - 2.7 mg/dL   Fingerstick Glucose (POCT)    Collection Time: 07/26/23  1:33 AM   Result Value Ref Range    POC Glucose 152 (H) 65 - 140 mg/dl   Basic metabolic panel    Collection Time: 07/26/23  6:17 AM   Result Value Ref Range    Sodium 139 135 - 147 mmol/L    Potassium 3.7 3.5 - 5.3 mmol/L    Chloride 103 96 - 108 mmol/L    CO2 28 21 - 32 mmol/L    ANION GAP 8 mmol/L    BUN 9 5 - 25 mg/dL    Creatinine 0.49 (L) 0.60 - 1.30 mg/dL    Glucose 116 65 - 140 mg/dL    Glucose, Fasting 116 (H) 65 - 99 mg/dL    Calcium 8.9 8.4 - 10.2 mg/dL    eGFR 113 ml/min/1.73sq m   Magnesium    Collection Time: 07/26/23  6:17 AM   Result Value Ref Range    Magnesium 2.1 1.9 - 2.7 mg/dL         Progress Toward Goals:some progress noted today. Pt is in no acute distress, will  increase Abilify dosage. Offered gabapentin for anxiety, Mood stabilization and tremors but pt reports she was treated with this before and did not do well. K levels have normalized. Maintain 1;1 while awake for disorganized behavior for today, will reassess tomorrow.      Assessment     Principal Problem:    Major depressive disorder with psychotic features (HCC)  Active Problems:    Hypokalemia    Headache    Generalized anxiety disorder    Acquired hypothyroidism    Leukocytosis    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HCC)    Crohn's colitis (HCC)    Seizure (HCC)    Bladder tumor    Encephalopathy chronic    Medical clearance for psychiatric admission    Seasonal allergies    GERD without esophagitis    Benzodiazepine misuse        Plan :    - Medications;   Psychiatric: continue sertraline 25mg daily   Increase abilify to 5mg daily for psychosis   Medical: per SLIM    -Therapy: occupational therapy, milieu and group therapy  - Legal: 201   -Disposition:Home when stable, obtain collateral and update family after she signs consent

## 2023-07-26 NOTE — CODE DOCUMENTATION
Pt responding to provider. Pt assessed by RRT. Deemed stable. Received 0.5mg Ativan PO. Remains on unit at this time.

## 2023-07-26 NOTE — CODE DOCUMENTATION
Patient had pseudoseizure per provider lasting 65 seconds , RRT called. IM haldol 5mg administered at 0139.

## 2023-07-26 NOTE — PLAN OF CARE
Problem: Alteration in Thoughts and Perception  Goal: Treatment Goal: Gain control of psychotic behaviors/thinking, reduce/eliminate presenting symptoms and demonstrate improved reality functioning upon discharge  Outcome: Progressing  Goal: Verbalize thoughts and feelings  Description: Interventions:  - Promote a nonjudgmental and trusting relationship with the patient through active listening and therapeutic communication  - Assess patient's level of functioning, behavior and potential for risk  - Engage patient in 1 on 1 interactions  - Encourage patient to express fears, feelings, frustrations, and discuss symptoms    - [place] patient to reality, help patient recognize reality-based thinking   - Administer medications as ordered and assess for potential side effects  - Provide the patient education related to the signs and symptoms of the illness and desired effects of prescribed medications  Outcome: Progressing  Goal: Refrain from acting on delusional thinking/internal stimuli  Description: Interventions:  - Monitor patient closely, per order   - Utilize least restrictive measures   - Set reasonable limits, give positive feedback for acceptable   - Administer medications as ordered and monitor of potential side effects  Outcome: Progressing  Goal: Agree to be compliant with medication regime, as prescribed and report medication side effects  Description: Interventions:  - Offer appropriate PRN medication and supervise ingestion; conduct AIMS, as needed   Outcome: Progressing  Goal: Attend and participate in unit activities, including therapeutic, recreational, and educational groups  Description: Interventions:  -Encourage Visitation and family involvement in care  Outcome: Progressing  Goal: Recognize dysfunctional thoughts, communicate reality-based thoughts at the time of discharge  Description: Interventions:  - Provide medication and psycho-education to assist patient in compliance and developing insight into his/her illness   Outcome: Progressing  Goal: Complete daily ADLs, including personal hygiene independently, as able  Description: Interventions:  - Observe, teach, and assist patient with ADLS  - Monitor and promote a balance of rest/activity, with adequate nutrition and elimination   Outcome: Progressing     Problem: Ineffective Coping  Goal: Cooperates with admission process  Description: Interventions:   - Complete admission process  Outcome: Progressing  Goal: Identifies ineffective coping skills  Outcome: Progressing  Goal: Identifies healthy coping skills  Outcome: Progressing  Goal: Demonstrates healthy coping skills  Outcome: Progressing  Goal: Participates in unit activities  Description: Interventions:  - Provide therapeutic environment   - Provide required programming   - Redirect inappropriate behaviors   Outcome: Progressing  Goal: Patient/Family participate in treatment and DC plans  Description: Interventions:  - Provide therapeutic environment  Outcome: Progressing  Goal: Patient/Family verbalizes awareness of resources  Outcome: Progressing     Problem: Risk for Self Injury/Neglect  Goal: Treatment Goal: Remain safe during length of stay, learn and adopt new coping skills, and be free of self-injurious ideation, impulses and acts at the time of discharge  Outcome: Progressing  Goal: Verbalize thoughts and feelings  Description: Interventions:  - Assess and re-assess patient's lethality and potential for self-injury  - Engage patient in 1:1 interactions, daily, for a minimum of 15 minutes  - Encourage patient to express feelings, fears, frustrations, hopes  - Establish rapport/trust with patient   Outcome: Progressing  Goal: Refrain from harming self  Description: Interventions:  - Monitor patient closely, per order  - Develop a trusting relationship  - Supervise medication ingestion, monitor effects and side effects   Outcome: Progressing  Goal: Attend and participate in unit activities, including therapeutic, recreational, and educational groups  Description: Interventions:  - Provide therapeutic and educational activities daily, encourage attendance and participation, and document same in the medical record  - Obtain collateral information, encourage visitation and family involvement in care   Outcome: Progressing  Goal: Recognize maladaptive responses and adopt new coping mechanisms  Outcome: Progressing  Goal: Complete daily ADLs, including personal hygiene independently, as able  Description: Interventions:  - Observe, teach, and assist patient with ADLS  - Monitor and promote a balance of rest/activity, with adequate nutrition and elimination  Outcome: Progressing     Problem: Depression  Goal: Treatment Goal: Demonstrate behavioral control of depressive symptoms, verbalize feelings of improved mood/affect, and adopt new coping skills prior to discharge  Outcome: Progressing  Goal: Verbalize thoughts and feelings  Description: Interventions:  - Assess and re-assess patient's level of risk   - Engage patient in 1:1 interactions, daily, for a minimum of 15 minutes   - Encourage patient to express feelings, fears, frustrations, hopes   Outcome: Progressing  Goal: Refrain from harming self  Description: Interventions:  - Monitor patient closely, per order   - Supervise medication ingestion, monitor effects and side effects   Outcome: Progressing  Goal: Refrain from isolation  Description: Interventions:  - Develop a trusting relationship   - Encourage socialization   Outcome: Progressing  Goal: Refrain from self-neglect  Outcome: Progressing  Goal: Attend and participate in unit activities, including therapeutic, recreational, and educational groups  Description: Interventions:  - Provide therapeutic and educational activities daily, encourage attendance and participation, and document same in the medical record   Outcome: Progressing  Goal: Complete daily ADLs, including personal hygiene independently, as able  Description: Interventions:  - Observe, teach, and assist patient with ADLS  -  Monitor and promote a balance of rest/activity, with adequate nutrition and elimination   Outcome: Progressing     Problem: Anxiety  Goal: Anxiety is at manageable level  Description: Interventions:  - Assess and monitor patient's anxiety level. - Monitor for signs and symptoms (heart palpitations, chest pain, shortness of breath, headaches, nausea, feeling jumpy, restlessness, irritable, apprehensive). - Collaborate with interdisciplinary team and initiate plan and interventions as ordered.   - [place] patient to unit/surroundings  - Explain treatment plan  - Encourage participation in care  - Encourage verbalization of concerns/fears  - Identify coping mechanisms  - Assist in developing anxiety-reducing skills  - Administer/offer alternative therapies  - Limit or eliminate stimulants  Outcome: Progressing     Problem: Risk for Violence/Aggression Toward Others  Goal: Treatment Goal: Refrain from acts of violence/aggression during length of stay, and demonstrate improved impulse control at the time of discharge  Outcome: Progressing  Goal: Verbalize thoughts and feelings  Description: Interventions:  - Assess and re-assess patient's level of risk, every waking shift  - Engage patient in 1:1 interactions, daily, for a minimum of 15 minutes   - Allow patient to express feelings and frustrations in a safe and non-threatening manner   - Establish rapport/trust with patient   Outcome: Progressing  Goal: Refrain from harming others  Outcome: Progressing  Goal: Refrain from destructive acts on the environment or property  Outcome: Progressing  Goal: Control angry outbursts  Description: Interventions:  - Monitor patient closely, per order  - Ensure early verbal de-escalation  - Monitor prn medication needs  - Set reasonable/therapeutic limits, outline behavioral expectations, and consequences   - Provide a non-threatening milieu, utilizing the least restrictive interventions   Outcome: Progressing  Goal: Attend and participate in unit activities, including therapeutic, recreational, and educational groups  Description: Interventions:  - Provide therapeutic and educational activities daily, encourage attendance and participation, and document same in the medical record   Outcome: Progressing  Goal: Identify appropriate positive anger management techniques  Description: Interventions:  - Offer anger management and coping skills groups   - Staff will provide positive feedback for appropriate anger control  Outcome: Progressing     Problem: Alteration in Orientation  Goal: Treatment Goal: Demonstrate a reduction of confusion and improved orientation to person, place, time and/or situation upon discharge, according to optimum baseline/ability  Outcome: Progressing  Goal: Interact with staff daily  Description: Interventions:  - Assess and re-assess patient's level of orientation  - Engage patient in 1 on 1 interactions, daily, for a minimum of 15 minutes   - Establish rapport/trust with patient   Outcome: Progressing  Goal: Express concerns related to confused thinking related to:  Description: Interventions:  - Encourage patient to express feelings, fears, frustrations, hopes  - Assign consistent caregivers   - [place]/re-orient patient as needed  - Allow comfort items, as appropriate  - Provide visual cues, signs, etc.   Outcome: Progressing  Goal: Attend and participate in unit activities, including therapeutic, recreational, and educational groups  Description: Interventions:  - Provide therapeutic and educational activities daily, encourage attendance and participation, and document same in the medical record   - Provide appropriate opportunities for reminiscence   - Provide a consistent daily routine   - Encourage family contact/visitation   Outcome: Progressing  Goal: Complete daily ADLs, including personal hygiene independently, as able  Description: Interventions:  - Observe, teach, and assist patient with ADLS  Outcome: Progressing     Problem: Individualized Interventions  Goal: Patient will verbalize appropriate use of telephone within 5 days  Description: Interventions:  - Treatment team to determine use of supervised phone privileges   Outcome: Progressing  Goal: Patient will verbalize need for hospitalization and will no longer attempt elopement within 5 days  Description: Interventions:  - Ongoing education to help patient understand need for hospitalization  Outcome: Progressing  Goal: Patient will recognize inappropriate behaviors and develop alternative behaviors within 5 days  Description: Interventions:  - Patient in collaboration with Treatment Team will develop a behavior management plan to help identify effective coping skills to deal with stressors  Outcome: Progressing

## 2023-07-26 NOTE — PROGRESS NOTES
07/26/23 1522   Team Meeting   Meeting Type Tx Team Meeting   Initial Conference Date 07/26/23   Team Members Present   Team Members Present Physician;Nurse;   Physician Team Member [id number] [facility] Team Member [facility] Management Team Member Kimani Lord   Patient/Family Present   Patient Present Yes   Patient's Family Present No     Tx plan was reviewed and discussed with Pt. Pt was encouraged to attend groups. Medication was discussed with Pt. Pt signed tx plan.
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
## 2023-07-26 NOTE — PROGRESS NOTES
07/26/23 0825   Team Meeting   Meeting Type Daily Rounds   Initial Conference Date 07/26/23   Team Members Present   Team Members Present Physician;Nurse;;; Occupational Therapist   Physician Team Member [id number] [facility] Team Member [facility] Management Team Member [facility] [address] Work Team Member Tamela   OT Team Member Keenan Spurling   Patient/Family Present   Patient Present No   Patient's Family Present No     Patient claimed to have a seizure yesterday and was given 1mg Haldol and was better after five minutes. It was described as a "pseudo seizure" by medical. Discharge is pending.

## 2023-07-26 NOTE — PLAN OF CARE
Pt.present for one of two groups this morning and displayed interest and appropriate social interaction.   Problem: Ineffective Coping  Goal: Participates in unit activities  Description: Interventions:  - Provide therapeutic environment   - Provide required programming   - Redirect inappropriate behaviors   Outcome: Progressing

## 2023-07-26 NOTE — RAPID RESPONSE
Rapid Response Note  Lexis Cleveland 46 y.o. female MRN: [id number]  Unit/Bed#: [facility] 605-01 Encounter: [id number]    Rapid Response Notification(s):   Response called date/time:  7/26/2023 1:25 AM  Response team arrival date/time:  7/26/2023 1:30 AM  Response end date/time:  7/26/2023 1:30 AM  Level of care:  [facility]  Rapid response location:  Psychiatric unit  Primary reason for rapid response call:  New onset of seizures    Rapid Response Intervention(s):   Airway:  None  Breathing:  None  Circulation:  None  Fluids administered:  None  Medications administered: Other (comment) (haldol )  Comments:  Patient was given 5mg of haldol        Assessment:   #Abdominal Pain   #Psuedo Seizures     49yo patient medica hist ory of PPH of anxiety, psychogenic seizures, mast cell activiation syndrome was  Found to have upper and lower extremity shaking suspected to be seizure. On  The differential includes epilepsy, though patient activity stopped after one minute and was not found to have post ictal state. Patient was also orientated with no urinary incontinence  Or tongue biting. Activity resolved with 5 mg of Ativan. Blood sugar 152 less likely secondary to hyperglycemia. Plan:   · Continue with frequent checks   · Re-orientate the patient   · If activity re occurs call primary team   · PRN agent antipsychotic on board for agitated behavior      Rapid Response Outcome:   Transfer:  Remain on floor  Primary service notified of transfer: No    Code Status: Level 1 (Full Code)      Family notified of transfer: no     Background/Situation:   Lexis Cleveland is a 46 y.o. 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 On arrival patient was interacting well with me and was able to answer all my questions. Patient neuro exam  Had no focal deficit.  patient did endorse abdominal pain which resolved with 5mg of haldol. Patient denied any chest pain or lightlessness. Blood sugar was 152. Review of Systems   Constitutional: Negative for chills and fever. HENT: Negative for ear pain and sore throat. Eyes: Negative for pain and visual disturbance. Respiratory: Negative for cough and shortness of breath. Cardiovascular: Negative for chest pain and palpitations. Gastrointestinal: Positive for abdominal pain. Negative for vomiting. Genitourinary: Negative for dysuria and hematuria. Musculoskeletal: Negative for arthralgias and back pain. Skin: Negative for color change and rash. Neurological: Negative for seizures and syncope. All other systems reviewed and are negative. Objective:   Vitals:    07/25/23 2025 07/26/23 0131 07/26/23 0135 07/26/23 0143   BP: 116/89 134/92 134/92 134/92   BP Location: Right arm  Left arm    Pulse: (!) 108 99 99 99   Resp: 18 16 16 16   Temp: (!) 96.2 °F (35.7 °C) (!) 97.4 °F (36.3 °C) (!) 97.4 °F (36.3 °C)    TempSrc: Temporal Temporal Temporal    SpO2: 98% 97% 99% 97%   Weight:       Height:         Physical Exam  Vitals and nursing note reviewed. Constitutional:       General: She is not in acute distress. Appearance: She is well-developed. HENT:      Head: Normocephalic and atraumatic. Eyes:      Conjunctiva/sclera: Conjunctivae normal.   Cardiovascular:      Rate and Rhythm: Normal rate and regular rhythm. Heart sounds: No murmur heard. Pulmonary:      Effort: Pulmonary effort is normal. No respiratory distress. Breath sounds: Normal breath sounds. Abdominal:      General: There is no distension. Palpations: Abdomen is soft. There is no mass. Tenderness: There is no abdominal tenderness. There is no guarding or rebound. Hernia: No hernia is present. Musculoskeletal:         General: No swelling. Cervical back: Neck supple. Skin:     General: Skin is warm and dry.       Capillary Refill: Capillary refill takes less than 2 seconds. Neurological:      Mental Status: She is alert. GCS: GCS eye subscore is 4. GCS verbal subscore is 5. GCS motor subscore is 6. Cranial Nerves: Cranial nerves 2-12 are intact. Sensory: Sensation is intact. Motor: Tremor present. No weakness, atrophy or seizure activity. Gait: Gait and tandem walk normal.   Psychiatric:         Mood and Affect: Mood normal.         Portions of the record may have been created with voice recognition software. Occasional wrong word or "sound a like" substitutions may have occurred due to the inherent limitations of voice recognition software. Read the chart carefully and recognize, using context, where substitutions have occurred.     Brandi Russell,

## 2023-07-26 NOTE — PROGRESS NOTES
07/26/23 1015 07/26/23 1100   Activity/Group Checklist   Group Life Skills  (topic vitality factors.) Other (Comment)  (short term probelm solving: coping skills and habits)   Attendance Attended Did not attend   Attendance Duration (min) 31-45  --    Interactions Interacted appropriately  (Pt. focused on the challenges of managing change in her life. Pt. appropriate with group focus and social interaction with peer at her immediate table. )  --    Affect/Mood Calm;Normal range  --    Goals Achieved Identified feelings; Able to engage in interactions; Able to listen to others  --

## 2023-07-27 PROCEDURE — 99232 SBSQ HOSP IP/OBS MODERATE 35: CPT | Performed by: STUDENT IN AN ORGANIZED HEALTH CARE EDUCATION/TRAINING PROGRAM

## 2023-07-27 RX ORDER — ARIPIPRAZOLE 10 MG/1
10 TABLET ORAL DAILY
Status: DISCONTINUED | OUTPATIENT
Start: 2023-07-28 | End: 2023-08-11

## 2023-07-27 RX ADMIN — HYDROXYZINE HYDROCHLORIDE 50 MG: 25 TABLET ORAL at 08:53

## 2023-07-27 RX ADMIN — PANTOPRAZOLE SODIUM 40 MG: 40 TABLET, DELAYED RELEASE ORAL at 05:39

## 2023-07-27 RX ADMIN — LIOTHYRONINE SODIUM 5 MCG: 5 TABLET ORAL at 08:32

## 2023-07-27 RX ADMIN — TRAZODONE HYDROCHLORIDE 50 MG: 50 TABLET ORAL at 21:25

## 2023-07-27 RX ADMIN — FAMOTIDINE 40 MG: 20 TABLET ORAL at 08:32

## 2023-07-27 RX ADMIN — ARIPIPRAZOLE 5 MG: 5 TABLET ORAL at 08:32

## 2023-07-27 RX ADMIN — HYDROXYZINE HYDROCHLORIDE 25 MG: 25 TABLET ORAL at 18:15

## 2023-07-27 RX ADMIN — SERTRALINE HYDROCHLORIDE 25 MG: 25 TABLET ORAL at 08:32

## 2023-07-27 RX ADMIN — CHOLECALCIFEROL TAB 25 MCG (1000 UNIT) 1000 UNITS: 25 TAB at 08:32

## 2023-07-27 RX ADMIN — RISPERIDONE 0.5 MG: 0.5 TABLET ORAL at 11:29

## 2023-07-27 RX ADMIN — LEVOTHYROXINE SODIUM 100 MCG: 100 TABLET ORAL at 05:39

## 2023-07-27 RX ADMIN — MONTELUKAST 10 MG: 10 TABLET, FILM COATED ORAL at 21:25

## 2023-07-27 NOTE — NURSING NOTE
Peter Soriano states the Risperdal 0.5mg was effective. Peter Soriano is resting quietly in bed.  Will continue to monitor and assess for safety

## 2023-07-27 NOTE — PLAN OF CARE
Problem: Alteration in Thoughts and Perception  Goal: Treatment Goal: Gain control of psychotic behaviors/thinking, reduce/eliminate presenting symptoms and demonstrate improved reality functioning upon discharge  Outcome: Progressing  Goal: Verbalize thoughts and feelings  Description: Interventions:  - Promote a nonjudgmental and trusting relationship with the patient through active listening and therapeutic communication  - Assess patient's level of functioning, behavior and potential for risk  - Engage patient in 1 on 1 interactions  - Encourage patient to express fears, feelings, frustrations, and discuss symptoms    - [place] patient to reality, help patient recognize reality-based thinking   - Administer medications as ordered and assess for potential side effects  - Provide the patient education related to the signs and symptoms of the illness and desired effects of prescribed medications  Outcome: Progressing  Goal: Refrain from acting on delusional thinking/internal stimuli  Description: Interventions:  - Monitor patient closely, per order   - Utilize least restrictive measures   - Set reasonable limits, give positive feedback for acceptable   - Administer medications as ordered and monitor of potential side effects  Outcome: Progressing  Goal: Agree to be compliant with medication regime, as prescribed and report medication side effects  Description: Interventions:  - Offer appropriate PRN medication and supervise ingestion; conduct AIMS, as needed   Outcome: Progressing  Goal: Recognize dysfunctional thoughts, communicate reality-based thoughts at the time of discharge  Description: Interventions:  - Provide medication and psycho-education to assist patient in compliance and developing insight into his/her illness   Outcome: Progressing  Goal: Complete daily ADLs, including personal hygiene independently, as able  Description: Interventions:  - Observe, teach, and assist patient with ADLS  - Monitor and promote a balance of rest/activity, with adequate nutrition and elimination   Outcome: Progressing     Problem: Ineffective Coping  Goal: Cooperates with admission process  Description: Interventions:   - Complete admission process  Outcome: Progressing  Goal: Identifies ineffective coping skills  Outcome: Progressing  Goal: Identifies healthy coping skills  Outcome: Progressing  Goal: Demonstrates healthy coping skills  Outcome: Progressing  Goal: Patient/Family participate in treatment and DC plans  Description: Interventions:  - Provide therapeutic environment  Outcome: Progressing  Goal: Patient/Family verbalizes awareness of resources  Outcome: Progressing  Goal: Understands least restrictive measures  Description: Interventions:  - Utilize least restrictive behavior  Outcome: Progressing  Goal: Free from restraint events  Description: - Utilize least restrictive measures   - Provide behavioral interventions   - Redirect inappropriate behaviors   Outcome: Progressing     Problem: Risk for Self Injury/Neglect  Goal: Treatment Goal: Remain safe during length of stay, learn and adopt new coping skills, and be free of self-injurious ideation, impulses and acts at the time of discharge  Outcome: Progressing  Goal: Verbalize thoughts and feelings  Description: Interventions:  - Assess and re-assess patient's lethality and potential for self-injury  - Engage patient in 1:1 interactions, daily, for a minimum of 15 minutes  - Encourage patient to express feelings, fears, frustrations, hopes  - Establish rapport/trust with patient   Outcome: Progressing  Goal: Refrain from harming self  Description: Interventions:  - Monitor patient closely, per order  - Develop a trusting relationship  - Supervise medication ingestion, monitor effects and side effects   Outcome: Progressing  Goal: Recognize maladaptive responses and adopt new coping mechanisms  Outcome: Progressing  Goal: Complete daily ADLs, including personal hygiene independently, as able  Description: Interventions:  - Observe, teach, and assist patient with ADLS  - Monitor and promote a balance of rest/activity, with adequate nutrition and elimination  Outcome: Progressing     Problem: Depression  Goal: Treatment Goal: Demonstrate behavioral control of depressive symptoms, verbalize feelings of improved mood/affect, and adopt new coping skills prior to discharge  Outcome: Progressing  Goal: Verbalize thoughts and feelings  Description: Interventions:  - Assess and re-assess patient's level of risk   - Engage patient in 1:1 interactions, daily, for a minimum of 15 minutes   - Encourage patient to express feelings, fears, frustrations, hopes   Outcome: Progressing  Goal: Refrain from harming self  Description: Interventions:  - Monitor patient closely, per order   - Supervise medication ingestion, monitor effects and side effects   Outcome: Progressing  Goal: Refrain from isolation  Description: Interventions:  - Develop a trusting relationship   - Encourage socialization   Outcome: Progressing  Goal: Refrain from self-neglect  Outcome: Progressing  Goal: Complete daily ADLs, including personal hygiene independently, as able  Description: Interventions:  - Observe, teach, and assist patient with ADLS  -  Monitor and promote a balance of rest/activity, with adequate nutrition and elimination   Outcome: Progressing     Problem: Anxiety  Goal: Anxiety is at manageable level  Description: Interventions:  - Assess and monitor patient's anxiety level. - Monitor for signs and symptoms (heart palpitations, chest pain, shortness of breath, headaches, nausea, feeling jumpy, restlessness, irritable, apprehensive). - Collaborate with interdisciplinary team and initiate plan and interventions as ordered.   - [place] patient to unit/surroundings  - Explain treatment plan  - Encourage participation in care  - Encourage verbalization of concerns/fears  - Identify coping mechanisms  - Assist in developing anxiety-reducing skills  - Administer/offer alternative therapies  - Limit or eliminate stimulants  Outcome: Progressing     Problem: Risk for Violence/Aggression Toward Others  Goal: Treatment Goal: Refrain from acts of violence/aggression during length of stay, and demonstrate improved impulse control at the time of discharge  Outcome: Progressing  Goal: Verbalize thoughts and feelings  Description: Interventions:  - Assess and re-assess patient's level of risk, every waking shift  - Engage patient in 1:1 interactions, daily, for a minimum of 15 minutes   - Allow patient to express feelings and frustrations in a safe and non-threatening manner   - Establish rapport/trust with patient   Outcome: Progressing  Goal: Refrain from harming others  Outcome: Progressing  Goal: Refrain from destructive acts on the environment or property  Outcome: Progressing  Goal: Control angry outbursts  Description: Interventions:  - Monitor patient closely, per order  - Ensure early verbal de-escalation  - Monitor prn medication needs  - Set reasonable/therapeutic limits, outline behavioral expectations, and consequences   - Provide a non-threatening milieu, utilizing the least restrictive interventions   Outcome: Progressing  Goal: Identify appropriate positive anger management techniques  Description: Interventions:  - Offer anger management and coping skills groups   - Staff will provide positive feedback for appropriate anger control  Outcome: Progressing

## 2023-07-27 NOTE — NURSING NOTE
Arthur Alvarado approached the desk stating after dinner she was itching on her chest. Provided 25mg Atarax was provided.  Will continue to monitor and assess for safety

## 2023-07-27 NOTE — PROGRESS NOTES
07/27/23 0829   Team Meeting   Meeting Type Daily Rounds   Initial Conference Date 07/27/23   Team Members Present   Team Members Present Physician;Nurse;;; Occupational Therapist   Physician Team Member [id number] [facility] Team Member [facility] Team Member [facility] [address] Work Team Member Tamela   OT Team Member Adriana Caceres   Patient/Family Present   Patient Present No   Patient's Family Present No     Patient alerted staff that she was having a seizure last night but only head was moving. She was able to say the word "seizure" while she was having it. Seizure was timed and medical was called. Ativan 0.5mg was given. Patient states that they are "pseudo seizures." One to one to be removed today. MOCA to be completed. Discharge is pending.

## 2023-07-27 NOTE — NURSING NOTE
Carissa Schroeder is anxious, withdrawn, and pleasant on approach. Carissa Schroeder denies SI/HI. Carissa Schroeder states she has AH-attempted to get detail-pt states "I hope it is just voices, but I don't remember". Carissa Schroeder has a poor appetite,at times skipping meals. Carissa Schroeder did not attend group. Carissa Schroeder states she did not sleep well last night d/t the noise. Carissa Schroeder did not shower. Carissa Schroeder is med compliant.  Will continue to monitor and assess for safety

## 2023-07-27 NOTE — TREATMENT TEAM
07/27/23 0853   Martin Anxiety Scale   Anxious Mood 4   Tension 4   Fears 4   Insomnia 4   Intellectual 1   Depressed Mood 1   Somatic Complaints: Muscular 0   Somatic Complaints: Sensory 0   Cardiovascular Symptoms 0   Respiratory Symptoms 0   Gastrointestinal Symptoms 0   Genitourinary Symptoms 0   Autonomic Symptoms 4   Behavior at Interview 4   Martin Anxiety Score 26     Pt appears to be shaking in bed, hand clenched and holding blankets. Pt able to identify anxiety with this writer. Pt received PRN atarax at 08:53.

## 2023-07-27 NOTE — NURSING NOTE
Leonides Henry was anxious and agitated. Agitated behavior scale 36. Risperdal 0.5mg was provided.  Will continue to monitor and assess for safety

## 2023-07-27 NOTE — PROGRESS NOTES
Progress Note - Behavioral Health   [facility] 46 [facility] MRN: [id number]  Unit/Bed#: Liane Carvajal 890-03 Encounter: [id number]    The patient was seen for continuing care and reviewed with treatment team. Yesterday around 437PM, pt reported she was having a seizure, she was stable, responded to 0.5mg of ativan. Today, pt was taken off 1:1. Later seen in her room after using the bathroom, she reports feeling cold, she went under the covers. She then began shaking, requested for a PRN. Reports AH of sounds, noises but did not elaborate. She was given PRN risperidone with fair response. Sleep- fluctuates, poor sleep last night  Appetite -  Reports it is poor today   Energy: low, she did not attend any groups and did not shower. No suicidal or homicidal ideations. No EPS, denies any side effects of medication. Pt signed MANOLO for her . I called him today to answer his questions and update him about current care plan. He verbalized understanding. ROS: reports shakes, anxiety. /70 (BP Location: Left arm)   Pulse 102   Temp 97.5 °F (36.4 °C) (Temporal)   Resp 18   Ht 5' 5" (1.651 m)   Wt 65.9 kg (145 lb 3.2 oz)   SpO2 95%   BMI 24.16 kg/m²     Current Mental Status Evaluation:  Appearance:  Adequate hygiene and grooming   Behavior:  Calm, Cooperative, Evasive and Superficial   Mood:  anxious and improving   Affect: inappropriate, anxious   Speech: Soft and Slow   Thought Process:  Circumstantial and Tangential at times   Thought Content:  Paranoid and mistrustful   Perceptual Disturbances: Auditory hallucinations without commands, sounds, noises, steps are persistent.     Risk Potential: No suicidal or homicidal ideation   Orientation:   Patient is alert, awake and oriented x 3   Patient has limited insight into her illness, Judgment and impulse control are limited    Recent Results (from the past 72 hour(s))   Comprehensive metabolic panel    Collection Time: 07/25/23 10:07 AM   Result Value Ref Range    Sodium 141 135 - 147 mmol/L    Potassium 2.7 (LL) 3.5 - 5.3 mmol/L    Chloride 98 96 - 108 mmol/L    CO2 25 21 - 32 mmol/L    ANION GAP 18 mmol/L    BUN 8 5 - 25 mg/dL    Creatinine 0.81 0.60 - 1.30 mg/dL    Glucose 121 65 - 140 mg/dL    Calcium 9.8 8.4 - 10.2 mg/dL    AST 22 13 - 39 U/L    ALT 39 7 - 52 U/L    Alkaline Phosphatase 55 34 - 104 U/L    Total Protein 7.4 6.4 - 8.4 g/dL    Albumin 4.4 3.5 - 5.0 g/dL    Total Bilirubin 0.55 0.20 - 1.00 mg/dL    eGFR 84 ml/min/1.73sq m   Magnesium    Collection Time: 07/25/23 10:07 AM   Result Value Ref Range    Magnesium 2.1 1.9 - 2.7 mg/dL   Phosphorus    Collection Time: 07/25/23 10:07 AM   Result Value Ref Range    Phosphorus 3.1 2.7 - 4.5 mg/dL   CBC and differential    Collection Time: 07/25/23 10:07 AM   Result Value Ref Range    WBC 14.83 (H) 4.31 - 10.16 Thousand/uL    RBC 5.16 (H) 3.81 - 5.12 Million/uL    Hemoglobin 15.1 11.5 - 15.4 g/dL    Hematocrit 47.0 (H) 34.8 - 46.1 %    MCV 91 82 - 98 fL    MCH 29.3 26.8 - 34.3 pg    MCHC 32.1 31.4 - 37.4 g/dL    RDW 15.3 (H) 11.6 - 15.1 %    MPV 9.0 8.9 - 12.7 fL    Platelets 092 (H) 080 - 390 Thousands/uL    nRBC 0 /100 WBCs    Neutrophils Relative 72 43 - 75 %    Immat GRANS % 1 0 - 2 %    Lymphocytes Relative 17 14 - 44 %    Monocytes Relative 8 4 - 12 %    Eosinophils Relative 1 0 - 6 %    Basophils Relative 1 0 - 1 %    Neutrophils Absolute 10.80 (H) 1.85 - 7.62 Thousands/µL    Immature Grans Absolute 0.11 0.00 - 0.20 Thousand/uL    Lymphocytes Absolute 2.57 0.60 - 4.47 Thousands/µL    Monocytes Absolute 1.19 0.17 - 1.22 Thousand/µL    Eosinophils Absolute 0.09 0.00 - 0.61 Thousand/µL    Basophils Absolute 0.07 0.00 - 0.10 Thousands/µL   Vitamin B12    Collection Time: 07/25/23 10:07 AM   Result Value Ref Range    Vitamin B-12 1,323 (H) 180 - 914 pg/mL   Folate    Collection Time: 07/25/23 10:07 AM   Result Value Ref Range    Folate 21.2 >5.9 ng/mL   Vitamin D 25 hydroxy    Collection Time: 07/25/23 10:07 AM   Result Value Ref Range    Vit D, 25-Hydroxy 19.6 (L) 30.0 - 100.0 ng/mL   ECG 12 lead    Collection Time: 07/25/23 11:07 AM   Result Value Ref Range    Ventricular Rate 99 BPM    Atrial Rate 99 BPM    NC Interval 118 ms    QRSD Interval 86 ms    QT Interval 538 ms    QTC Interval 690 ms    P Slick 33 degrees    QRS Axis 21 degrees    T Wave Axis 54 degrees   Basic metabolic panel    Collection Time: 07/25/23  8:16 PM   Result Value Ref Range    Sodium 138 135 - 147 mmol/L    Potassium 5.4 (H) 3.5 - 5.3 mmol/L    Chloride 105 96 - 108 mmol/L    CO2 21 21 - 32 mmol/L    ANION GAP 12 mmol/L    BUN 10 5 - 25 mg/dL    Creatinine 0.64 0.60 - 1.30 mg/dL    Glucose 133 65 - 140 mg/dL    Calcium 9.5 8.4 - 10.2 mg/dL    eGFR 104 ml/min/1.73sq m   Magnesium    Collection Time: 07/25/23  8:16 PM   Result Value Ref Range    Magnesium 2.3 1.9 - 2.7 mg/dL   Fingerstick Glucose (POCT)    Collection Time: 07/26/23  1:33 AM   Result Value Ref Range    POC Glucose 152 (H) 65 - 140 mg/dl   Basic metabolic panel    Collection Time: 07/26/23  6:17 AM   Result Value Ref Range    Sodium 139 135 - 147 mmol/L    Potassium 3.7 3.5 - 5.3 mmol/L    Chloride 103 96 - 108 mmol/L    CO2 28 21 - 32 mmol/L    ANION GAP 8 mmol/L    BUN 9 5 - 25 mg/dL    Creatinine 0.49 (L) 0.60 - 1.30 mg/dL    Glucose 116 65 - 140 mg/dL    Glucose, Fasting 116 (H) 65 - 99 mg/dL    Calcium 8.9 8.4 - 10.2 mg/dL    eGFR 113 ml/min/1.73sq m   Magnesium    Collection Time: 07/26/23  6:17 AM   Result Value Ref Range    Magnesium 2.1 1.9 - 2.7 mg/dL   Fingerstick Glucose (POCT)    Collection Time: 07/26/23  4:22 PM   Result Value Ref Range    POC Glucose 156 (H) 65 - 140 mg/dl         Progress Toward Goals:some progress noted today. Taken off 1;1 but some regression noted today. Pt continues to tolerate her medications . Will increase Sertraline to 50mg daily tomorrow and Abilify to 10mg daily.    Assessment     Principal Problem:    Major depressive disorder with psychotic features ([address])  Active Problems:    Hypokalemia    Headache    Generalized anxiety disorder    Acquired hypothyroidism    Leukocytosis    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HCC)    Crohn's colitis (HCC)    Seizure (HCC)    Bladder tumor    Encephalopathy chronic    Medical clearance for psychiatric admission    Seasonal allergies    GERD without esophagitis    Benzodiazepine misuse        Plan :    - Medications;   Psychiatric: Increase sertraline 50mg daily   Increase abilify to 10mg daily for psychosis   Medical: per SLIM    -Therapy: occupational therapy, milieu and group therapy  - Legal: 201   -Disposition:Home when stable, obtain collateral and update family after she signs consent

## 2023-07-27 NOTE — PLAN OF CARE
Problem: Alteration in Thoughts and Perception  Goal: Treatment Goal: Gain control of psychotic behaviors/thinking, reduce/eliminate presenting symptoms and demonstrate improved reality functioning upon discharge  Outcome: Not Progressing  Goal: Verbalize thoughts and feelings  Description: Interventions:  - Promote a nonjudgmental and trusting relationship with the patient through active listening and therapeutic communication  - Assess patient's level of functioning, behavior and potential for risk  - Engage patient in 1 on 1 interactions  - Encourage patient to express fears, feelings, frustrations, and discuss symptoms    - [place] patient to reality, help patient recognize reality-based thinking   - Administer medications as ordered and assess for potential side effects  - Provide the patient education related to the signs and symptoms of the illness and desired effects of prescribed medications  Outcome: Not Progressing  Goal: Refrain from acting on delusional thinking/internal stimuli  Description: Interventions:  - Monitor patient closely, per order   - Utilize least restrictive measures   - Set reasonable limits, give positive feedback for acceptable   - Administer medications as ordered and monitor of potential side effects  Outcome: Not Progressing  Goal: Agree to be compliant with medication regime, as prescribed and report medication side effects  Description: Interventions:  - Offer appropriate PRN medication and supervise ingestion; conduct AIMS, as needed   Outcome: Not Progressing  Goal: Attend and participate in unit activities, including therapeutic, recreational, and educational groups  Description: Interventions:  -Encourage Visitation and family involvement in care  Outcome: Not Progressing  Goal: Recognize dysfunctional thoughts, communicate reality-based thoughts at the time of discharge  Description: Interventions:  - Provide medication and psycho-education to assist patient in compliance and developing insight into his/her illness   Outcome: Not Progressing  Goal: Complete daily ADLs, including personal hygiene independently, as able  Description: Interventions:  - Observe, teach, and assist patient with ADLS  - Monitor and promote a balance of rest/activity, with adequate nutrition and elimination   Outcome: Not Progressing     Problem: Ineffective Coping  Goal: Cooperates with admission process  Description: Interventions:   - Complete admission process  Outcome: Not Progressing  Goal: Identifies ineffective coping skills  Outcome: Not Progressing  Goal: Identifies healthy coping skills  Outcome: Not Progressing  Goal: Demonstrates healthy coping skills  Outcome: Not Progressing  Goal: Participates in unit activities  Description: Interventions:  - Provide therapeutic environment   - Provide required programming   - Redirect inappropriate behaviors   Outcome: Not Progressing  Goal: Patient/Family participate in treatment and DC plans  Description: Interventions:  - Provide therapeutic environment  Outcome: Not Progressing  Goal: Patient/Family verbalizes awareness of resources  Outcome: Not Progressing  Goal: Understands least restrictive measures  Description: Interventions:  - Utilize least restrictive behavior  Outcome: Not Progressing  Goal: Free from restraint events  Description: - Utilize least restrictive measures   - Provide behavioral interventions   - Redirect inappropriate behaviors   Outcome: Not Progressing     Problem: Risk for Self Injury/Neglect  Goal: Treatment Goal: Remain safe during length of stay, learn and adopt new coping skills, and be free of self-injurious ideation, impulses and acts at the time of discharge  Outcome: Not Progressing  Goal: Verbalize thoughts and feelings  Description: Interventions:  - Assess and re-assess patient's lethality and potential for self-injury  - Engage patient in 1:1 interactions, daily, for a minimum of 15 minutes  - Encourage patient to express feelings, fears, frustrations, hopes  - Establish rapport/trust with patient   Outcome: Not Progressing  Goal: Refrain from harming self  Description: Interventions:  - Monitor patient closely, per order  - Develop a trusting relationship  - Supervise medication ingestion, monitor effects and side effects   Outcome: Not Progressing  Goal: Attend and participate in unit activities, including therapeutic, recreational, and educational groups  Description: Interventions:  - Provide therapeutic and educational activities daily, encourage attendance and participation, and document same in the medical record  - Obtain collateral information, encourage visitation and family involvement in care   Outcome: Not Progressing  Goal: Recognize maladaptive responses and adopt new coping mechanisms  Outcome: Not Progressing  Goal: Complete daily ADLs, including personal hygiene independently, as able  Description: Interventions:  - Observe, teach, and assist patient with ADLS  - Monitor and promote a balance of rest/activity, with adequate nutrition and elimination  Outcome: Not Progressing     Problem: Depression  Goal: Treatment Goal: Demonstrate behavioral control of depressive symptoms, verbalize feelings of improved mood/affect, and adopt new coping skills prior to discharge  Outcome: Not Progressing  Goal: Verbalize thoughts and feelings  Description: Interventions:  - Assess and re-assess patient's level of risk   - Engage patient in 1:1 interactions, daily, for a minimum of 15 minutes   - Encourage patient to express feelings, fears, frustrations, hopes   Outcome: Not Progressing  Goal: Refrain from harming self  Description: Interventions:  - Monitor patient closely, per order   - Supervise medication ingestion, monitor effects and side effects   Outcome: Not Progressing  Goal: Refrain from isolation  Description: Interventions:  - Develop a trusting relationship   - Encourage socialization   Outcome: Not Progressing  Goal: Refrain from self-neglect  Outcome: Not Progressing  Goal: Attend and participate in unit activities, including therapeutic, recreational, and educational groups  Description: Interventions:  - Provide therapeutic and educational activities daily, encourage attendance and participation, and document same in the medical record   Outcome: Not Progressing  Goal: Complete daily ADLs, including personal hygiene independently, as able  Description: Interventions:  - Observe, teach, and assist patient with ADLS  -  Monitor and promote a balance of rest/activity, with adequate nutrition and elimination   Outcome: Not Progressing     Problem: Anxiety  Goal: Anxiety is at manageable level  Description: Interventions:  - Assess and monitor patient's anxiety level. - Monitor for signs and symptoms (heart palpitations, chest pain, shortness of breath, headaches, nausea, feeling jumpy, restlessness, irritable, apprehensive). - Collaborate with interdisciplinary team and initiate plan and interventions as ordered.   - [place] patient to unit/surroundings  - Explain treatment plan  - Encourage participation in care  - Encourage verbalization of concerns/fears  - Identify coping mechanisms  - Assist in developing anxiety-reducing skills  - Administer/offer alternative therapies  - Limit or eliminate stimulants  Outcome: Not Progressing     Problem: Risk for Violence/Aggression Toward Others  Goal: Treatment Goal: Refrain from acts of violence/aggression during length of stay, and demonstrate improved impulse control at the time of discharge  Outcome: Not Progressing  Goal: Verbalize thoughts and feelings  Description: Interventions:  - Assess and re-assess patient's level of risk, every waking shift  - Engage patient in 1:1 interactions, daily, for a minimum of 15 minutes   - Allow patient to express feelings and frustrations in a safe and non-threatening manner   - Establish rapport/trust with patient Outcome: Not Progressing  Goal: Refrain from harming others  Outcome: Not Progressing  Goal: Refrain from destructive acts on the environment or property  Outcome: Not Progressing  Goal: Control angry outbursts  Description: Interventions:  - Monitor patient closely, per order  - Ensure early verbal de-escalation  - Monitor prn medication needs  - Set reasonable/therapeutic limits, outline behavioral expectations, and consequences   - Provide a non-threatening milieu, utilizing the least restrictive interventions   Outcome: Not Progressing  Goal: Attend and participate in unit activities, including therapeutic, recreational, and educational groups  Description: Interventions:  - Provide therapeutic and educational activities daily, encourage attendance and participation, and document same in the medical record   Outcome: Not Progressing  Goal: Identify appropriate positive anger management techniques  Description: Interventions:  - Offer anger management and coping skills groups   - Staff will provide positive feedback for appropriate anger control  Outcome: Not Progressing     Problem: Alteration in Orientation  Goal: Treatment Goal: Demonstrate a reduction of confusion and improved orientation to person, place, time and/or situation upon discharge, according to optimum baseline/ability  Outcome: Not Progressing  Goal: Interact with staff daily  Description: Interventions:  - Assess and re-assess patient's level of orientation  - Engage patient in 1 on 1 interactions, daily, for a minimum of 15 minutes   - Establish rapport/trust with patient   Outcome: Not Progressing  Goal: Express concerns related to confused thinking related to:  Description: Interventions:  - Encourage patient to express feelings, fears, frustrations, hopes  - Assign consistent caregivers   - [place]/re-orient patient as needed  - Allow comfort items, as appropriate  - Provide visual cues, signs, etc.   Outcome: Not Progressing  Goal: Allow medical examinations, as recommended  Description: Interventions:  - Provide physical/neurological exams and/or referrals, per provider   Outcome: Not Progressing  Goal: Cooperate with recommended testing/procedures  Description: Interventions:  - Determine need for ancillary testing  - Observe for mental status changes  - Implement falls/precaution protocol   Outcome: Not Progressing  Goal: Attend and participate in unit activities, including therapeutic, recreational, and educational groups  Description: Interventions:  - Provide therapeutic and educational activities daily, encourage attendance and participation, and document same in the medical record   - Provide appropriate opportunities for reminiscence   - Provide a consistent daily routine   - Encourage family contact/visitation   Outcome: Not Progressing  Goal: Complete daily ADLs, including personal hygiene independently, as able  Description: Interventions:  - Observe, teach, and assist patient with ADLS  Outcome: Not Progressing     Problem: Individualized Interventions  Goal: Patient will verbalize appropriate use of telephone within 5 days  Description: Interventions:  - Treatment team to determine use of supervised phone privileges   Outcome: Not Progressing  Goal: Patient will verbalize need for hospitalization and will no longer attempt elopement within 5 days  Description: Interventions:  - Ongoing education to help patient understand need for hospitalization  Outcome: Not Progressing  Goal: Patient will recognize inappropriate behaviors and develop alternative behaviors within 5 days  Description: Interventions:  - Patient in collaboration with Treatment Team will develop a behavior management plan to help identify effective coping skills to deal with stressors  Outcome: Not Progressing     Problem: DISCHARGE PLANNING  Goal: Discharge to home or other facility with appropriate resources  Description: INTERVENTIONS:  - Identify barriers to discharge w/patient and caregiver  - Arrange for needed discharge resources and transportation as appropriate  - Identify discharge learning needs (meds, wound care, etc.)  - Arrange for interpretive services to assist at discharge as needed  - Refer to Case Management Department for coordinating discharge planning if the patient needs post-hospital services based on physician/advanced practitioner order or complex needs related to functional status, cognitive ability, or social support system  Outcome: Not Progressing

## 2023-07-28 LAB
ANION GAP SERPL CALCULATED.3IONS-SCNC: 12 MMOL/L
BUN SERPL-MCNC: 13 MG/DL (ref 5–25)
CALCIUM SERPL-MCNC: 8.7 MG/DL (ref 8.4–10.2)
CHLORIDE SERPL-SCNC: 103 MMOL/L (ref 96–108)
CO2 SERPL-SCNC: 23 MMOL/L (ref 21–32)
CREAT SERPL-MCNC: 0.64 MG/DL (ref 0.6–1.3)
GFR SERPL CREATININE-BSD FRML MDRD: 104 ML/MIN/1.73SQ M
GLUCOSE P FAST SERPL-MCNC: 87 MG/DL (ref 65–99)
GLUCOSE SERPL-MCNC: 87 MG/DL (ref 65–140)
POTASSIUM SERPL-SCNC: 4.9 MMOL/L (ref 3.5–5.3)
SODIUM SERPL-SCNC: 138 MMOL/L (ref 135–147)

## 2023-07-28 PROCEDURE — 80048 BASIC METABOLIC PNL TOTAL CA: CPT | Performed by: PHYSICIAN ASSISTANT

## 2023-07-28 PROCEDURE — 99232 SBSQ HOSP IP/OBS MODERATE 35: CPT | Performed by: STUDENT IN AN ORGANIZED HEALTH CARE EDUCATION/TRAINING PROGRAM

## 2023-07-28 RX ADMIN — TRAZODONE HYDROCHLORIDE 50 MG: 50 TABLET ORAL at 21:36

## 2023-07-28 RX ADMIN — ARIPIPRAZOLE 10 MG: 10 TABLET ORAL at 08:24

## 2023-07-28 RX ADMIN — SERTRALINE HYDROCHLORIDE 50 MG: 50 TABLET ORAL at 08:24

## 2023-07-28 RX ADMIN — LIOTHYRONINE SODIUM 5 MCG: 5 TABLET ORAL at 08:27

## 2023-07-28 RX ADMIN — FAMOTIDINE 40 MG: 20 TABLET ORAL at 08:24

## 2023-07-28 RX ADMIN — LEVOTHYROXINE SODIUM 100 MCG: 100 TABLET ORAL at 06:23

## 2023-07-28 RX ADMIN — HYDROXYZINE HYDROCHLORIDE 50 MG: 25 TABLET ORAL at 23:13

## 2023-07-28 RX ADMIN — PREDNISONE 20 MG: 20 TABLET ORAL at 08:24

## 2023-07-28 RX ADMIN — CHOLECALCIFEROL TAB 25 MCG (1000 UNIT) 1000 UNITS: 25 TAB at 08:24

## 2023-07-28 RX ADMIN — PANTOPRAZOLE SODIUM 40 MG: 40 TABLET, DELAYED RELEASE ORAL at 06:23

## 2023-07-28 RX ADMIN — HYDROXYZINE HYDROCHLORIDE 50 MG: 25 TABLET ORAL at 12:53

## 2023-07-28 RX ADMIN — MONTELUKAST 10 MG: 10 TABLET, FILM COATED ORAL at 21:36

## 2023-07-28 NOTE — PROGRESS NOTES
07/28/23 0753   Team Meeting   Meeting Type Daily Rounds   Initial Conference Date 07/28/23   Team Members Present   Team Members Present Physician;Nurse;;; Occupational Therapist   Physician Team Member [id number] Team Member [facility] Team Member [facility] [address] Work Team Member Tamela   OT Team Member Chyna Aguilera   Patient/Family Present   Patient Present No   Patient's Family Present No        07/28/23 0753   Team Meeting   Meeting Type Daily Rounds   Initial Conference Date 07/28/23   Team Members Present   Team Members Present Physician;Nurse;;; Occupational Therapist   Physician Team Member [id number] Team Member [facility] Team Member [facility] [address] Work Team Member Tamela   OT Team Member Chyna Aguilera   Patient/Family Present   Patient Present No   Patient's Family Present No     Patient has been seizure free since yesterday morning. Patient got atarax yesterday evening for itchiness and again in the morning. She received trazodone at 21:25 and it was effective. Zoloft and Abilify were raised. Discharge is pending.

## 2023-07-28 NOTE — NURSING NOTE
Patient is calm, visible, compliant with meds, and cooperative with assessment questions. Patient denies SI, AH, VH at the moment. Will continue to monitor and maintain safety.

## 2023-07-28 NOTE — NURSING NOTE
Beronica Jackson is sleeping. 50mg Trazodone was effective.  Will continue to monitor and assess for safety

## 2023-07-28 NOTE — PLAN OF CARE
Problem: Alteration in Thoughts and Perception  Goal: Treatment Goal: Gain control of psychotic behaviors/thinking, reduce/eliminate presenting symptoms and demonstrate improved reality functioning upon discharge  Outcome: Progressing  Goal: Verbalize thoughts and feelings  Description: Interventions:  - Promote a nonjudgmental and trusting relationship with the patient through active listening and therapeutic communication  - Assess patient's level of functioning, behavior and potential for risk  - Engage patient in 1 on 1 interactions  - Encourage patient to express fears, feelings, frustrations, and discuss symptoms    - [place] patient to reality, help patient recognize reality-based thinking   - Administer medications as ordered and assess for potential side effects  - Provide the patient education related to the signs and symptoms of the illness and desired effects of prescribed medications  Outcome: Progressing  Goal: Refrain from acting on delusional thinking/internal stimuli  Description: Interventions:  - Monitor patient closely, per order   - Utilize least restrictive measures   - Set reasonable limits, give positive feedback for acceptable   - Administer medications as ordered and monitor of potential side effects  Outcome: Progressing  Goal: Agree to be compliant with medication regime, as prescribed and report medication side effects  Description: Interventions:  - Offer appropriate PRN medication and supervise ingestion; conduct AIMS, as needed   Outcome: Progressing  Goal: Recognize dysfunctional thoughts, communicate reality-based thoughts at the time of discharge  Description: Interventions:  - Provide medication and psycho-education to assist patient in compliance and developing insight into his/her illness   Outcome: Progressing  Goal: Complete daily ADLs, including personal hygiene independently, as able  Description: Interventions:  - Observe, teach, and assist patient with ADLS  - Monitor and promote a balance of rest/activity, with adequate nutrition and elimination   Outcome: Progressing     Problem: Ineffective Coping  Goal: Cooperates with admission process  Description: Interventions:   - Complete admission process  Outcome: Progressing  Goal: Identifies ineffective coping skills  Outcome: Progressing  Goal: Identifies healthy coping skills  Outcome: Progressing  Goal: Demonstrates healthy coping skills  Outcome: Progressing  Goal: Patient/Family verbalizes awareness of resources  Outcome: Progressing     Problem: Risk for Self Injury/Neglect  Goal: Treatment Goal: Remain safe during length of stay, learn and adopt new coping skills, and be free of self-injurious ideation, impulses and acts at the time of discharge  Outcome: Progressing  Goal: Verbalize thoughts and feelings  Description: Interventions:  - Assess and re-assess patient's lethality and potential for self-injury  - Engage patient in 1:1 interactions, daily, for a minimum of 15 minutes  - Encourage patient to express feelings, fears, frustrations, hopes  - Establish rapport/trust with patient   Outcome: Progressing  Goal: Refrain from harming self  Description: Interventions:  - Monitor patient closely, per order  - Develop a trusting relationship  - Supervise medication ingestion, monitor effects and side effects   Outcome: Progressing  Goal: Recognize maladaptive responses and adopt new coping mechanisms  Outcome: Progressing  Goal: Complete daily ADLs, including personal hygiene independently, as able  Description: Interventions:  - Observe, teach, and assist patient with ADLS  - Monitor and promote a balance of rest/activity, with adequate nutrition and elimination  Outcome: Progressing     Problem: Depression  Goal: Treatment Goal: Demonstrate behavioral control of depressive symptoms, verbalize feelings of improved mood/affect, and adopt new coping skills prior to discharge  Outcome: Progressing  Goal: Verbalize thoughts and feelings  Description: Interventions:  - Assess and re-assess patient's level of risk   - Engage patient in 1:1 interactions, daily, for a minimum of 15 minutes   - Encourage patient to express feelings, fears, frustrations, hopes   Outcome: Progressing  Goal: Refrain from harming self  Description: Interventions:  - Monitor patient closely, per order   - Supervise medication ingestion, monitor effects and side effects   Outcome: Progressing  Goal: Refrain from isolation  Description: Interventions:  - Develop a trusting relationship   - Encourage socialization   Outcome: Progressing  Goal: Refrain from self-neglect  Outcome: Progressing  Goal: Complete daily ADLs, including personal hygiene independently, as able  Description: Interventions:  - Observe, teach, and assist patient with ADLS  -  Monitor and promote a balance of rest/activity, with adequate nutrition and elimination   Outcome: Progressing     Problem: Anxiety  Goal: Anxiety is at manageable level  Description: Interventions:  - Assess and monitor patient's anxiety level. - Monitor for signs and symptoms (heart palpitations, chest pain, shortness of breath, headaches, nausea, feeling jumpy, restlessness, irritable, apprehensive). - Collaborate with interdisciplinary team and initiate plan and interventions as ordered.   - [place] patient to unit/surroundings  - Explain treatment plan  - Encourage participation in care  - Encourage verbalization of concerns/fears  - Identify coping mechanisms  - Assist in developing anxiety-reducing skills  - Administer/offer alternative therapies  - Limit or eliminate stimulants  Outcome: Progressing     Problem: Risk for Violence/Aggression Toward Others  Goal: Treatment Goal: Refrain from acts of violence/aggression during length of stay, and demonstrate improved impulse control at the time of discharge  Outcome: Progressing  Goal: Verbalize thoughts and feelings  Description: Interventions:  - Assess and re-assess patient's level of risk, every waking shift  - Engage patient in 1:1 interactions, daily, for a minimum of 15 minutes   - Allow patient to express feelings and frustrations in a safe and non-threatening manner   - Establish rapport/trust with patient   Outcome: Progressing  Goal: Refrain from harming others  Outcome: Progressing  Goal: Refrain from destructive acts on the environment or property  Outcome: Progressing  Goal: Control angry outbursts  Description: Interventions:  - Monitor patient closely, per order  - Ensure early verbal de-escalation  - Monitor prn medication needs  - Set reasonable/therapeutic limits, outline behavioral expectations, and consequences   - Provide a non-threatening milieu, utilizing the least restrictive interventions   Outcome: Progressing  Goal: Identify appropriate positive anger management techniques  Description: Interventions:  - Offer anger management and coping skills groups   - Staff will provide positive feedback for appropriate anger control  Outcome: Progressing     Problem: Alteration in Orientation  Goal: Treatment Goal: Demonstrate a reduction of confusion and improved orientation to person, place, time and/or situation upon discharge, according to optimum baseline/ability  Outcome: Progressing  Goal: Interact with staff daily  Description: Interventions:  - Assess and re-assess patient's level of orientation  - Engage patient in 1 on 1 interactions, daily, for a minimum of 15 minutes   - Establish rapport/trust with patient   Outcome: Progressing  Goal: Express concerns related to confused thinking related to:  Description: Interventions:  - Encourage patient to express feelings, fears, frustrations, hopes  - Assign consistent caregivers   - [place]/re-orient patient as needed  - Allow comfort items, as appropriate  - Provide visual cues, signs, etc.   Outcome: Progressing  Goal: Cooperate with recommended testing/procedures  Description: Interventions:  - Determine need for ancillary testing  - Observe for mental status changes  - Implement falls/precaution protocol   Outcome: Progressing    Goal: Complete daily ADLs, including personal hygiene independently, as able  Description: Interventions:  - Observe, teach, and assist patient with ADLS  Outcome: Progressing     Problem: Individualized Interventions  Goal: Patient will verbalize appropriate use of telephone within 5 days  Description: Interventions:  - Treatment team to determine use of supervised phone privileges   Outcome: Progressing  Goal: Patient will verbalize need for hospitalization and will no longer attempt elopement within 5 days  Description: Interventions:  - Ongoing education to help patient understand need for hospitalization  Outcome: Progressing  Goal: Patient will recognize inappropriate behaviors and develop alternative behaviors within 5 days  Description: Interventions:  - Patient in collaboration with Treatment Team will develop a behavior management plan to help identify effective coping skills to deal with stressors  Outcome: Progressing

## 2023-07-28 NOTE — PROGRESS NOTES
Progress Note - Behavioral Health   Lexis [place] 46 y.o. female MRN: [id number]  Unit/Bed#: [facility] 754-26 Encounter: [id number]    The patient was seen for continuing care and reviewed with treatment team.  She remains isolative, scant and and needs prompting but mood appears brighter today. She continues to struggle with AH and has trouble describing what she is experiences but hears usual sounds such as crickets, machinery intermittently, no CAH reported. sheis tolerating the Abilify increase well    Sleep- slept better with Trazodone PRN last night  Appetite - ate her meals  Energy: fair  No suicidal or homicidal ideations. ROS:  None reported, she feels she is on the right track with regards to medication regimen and adjustments. She had loose stool and some itchiness after few meals , she required atarax but this has resolved. /95 (BP Location: Left arm)   Pulse (!) 112   Temp 98.1 °F (36.7 °C) (Temporal)   Resp 18   Ht 5' 5" (1.651 m)   Wt 65.9 kg (145 lb 3.2 oz)   SpO2 97%   BMI 24.16 kg/m²     Current Mental Status Evaluation:  Appearance:  Adequate hygiene and grooming   Behavior:  Calm, Cooperative, Evasive and Superficial   Mood:  anxious and improving   Affect: Tearful, anxious   Speech: Soft and Slow   Thought Process:  Circumstantial and Tangential at times   Thought Content:  Paranoid and mistrustful   Perceptual Disturbances: Auditory hallucinations without commands, sounds, noises, steps are persistent.     Risk Potential: No suicidal or homicidal ideation   Orientation:   Patient is alert, awake and oriented x 3   Patient has limited insight into her illness, Judgment and impulse control are limited    Recent Results (from the past 72 hour(s))   Basic metabolic panel    Collection Time: 07/25/23  8:16 PM   Result Value Ref Range    Sodium 138 135 - 147 mmol/L    Potassium 5.4 (H) 3.5 - 5.3 mmol/L    Chloride 105 96 - 108 mmol/L    CO2 21 21 - 32 mmol/L    ANION GAP 12 mmol/L BUN 10 5 - 25 mg/dL    Creatinine 0.64 0.60 - 1.30 mg/dL    Glucose 133 65 - 140 mg/dL    Calcium 9.5 8.4 - 10.2 mg/dL    eGFR 104 ml/min/1.73sq m   Magnesium    Collection Time: 07/25/23  8:16 PM   Result Value Ref Range    Magnesium 2.3 1.9 - 2.7 mg/dL   Fingerstick Glucose (POCT)    Collection Time: 07/26/23  1:33 AM   Result Value Ref Range    POC Glucose 152 (H) 65 - 140 mg/dl   Basic metabolic panel    Collection Time: 07/26/23  6:17 AM   Result Value Ref Range    Sodium 139 135 - 147 mmol/L    Potassium 3.7 3.5 - 5.3 mmol/L    Chloride 103 96 - 108 mmol/L    CO2 28 21 - 32 mmol/L    ANION GAP 8 mmol/L    BUN 9 5 - 25 mg/dL    Creatinine 0.49 (L) 0.60 - 1.30 mg/dL    Glucose 116 65 - 140 mg/dL    Glucose, Fasting 116 (H) 65 - 99 mg/dL    Calcium 8.9 8.4 - 10.2 mg/dL    eGFR 113 ml/min/1.73sq m   Magnesium    Collection Time: 07/26/23  6:17 AM   Result Value Ref Range    Magnesium 2.1 1.9 - 2.7 mg/dL   Fingerstick Glucose (POCT)    Collection Time: 07/26/23  4:22 PM   Result Value Ref Range    POC Glucose 156 (H) 65 - 140 mg/dl   Basic metabolic panel    Collection Time: 07/28/23  6:08 AM   Result Value Ref Range    Sodium 138 135 - 147 mmol/L    Potassium 4.9 3.5 - 5.3 mmol/L    Chloride 103 96 - 108 mmol/L    CO2 23 21 - 32 mmol/L    ANION GAP 12 mmol/L    BUN 13 5 - 25 mg/dL    Creatinine 0.64 0.60 - 1.30 mg/dL    Glucose 87 65 - 140 mg/dL    Glucose, Fasting 87 65 - 99 mg/dL    Calcium 8.7 8.4 - 10.2 mg/dL    eGFR 104 ml/min/1.73sq m         Progress Toward Goals:some progress noted today. Sertraline to 50mg daily  and Abilify to 10mg daily. Pt reports her wedding anniversary is on Sunday and was hopeful for a visit with her .    Assessment     Principal Problem:    Major depressive disorder with psychotic features ([address])  Active Problems:    Hypokalemia    Headache    Generalized anxiety disorder    Acquired hypothyroidism    Leukocytosis    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HCC)    Crohn's colitis ([address])    Seizure ([address])    Bladder tumor    Encephalopathy chronic    Medical clearance for psychiatric admission    Seasonal allergies    GERD without esophagitis    Benzodiazepine misuse        Plan :    - Medications;   Psychiatric: sertraline 50mg daily   Abilify to 10mg daily for psychosis   Medical: per SLIM    -Therapy: occupational therapy, milieu and group therapy  - Legal: 201   -Disposition:Home when stable,

## 2023-07-28 NOTE — PROGRESS NOTES
07/28/23 1015 07/28/23 1330   Activity/Group Checklist   Group Exercise  (seated mindful movments exercises.) Wellness   Attendance Attended Did not attend   Attendance Duration (min) 31-45  --    Interactions Interacted appropriately  --    Affect/Mood Appropriate;Calm;Normal range  --    Goals Achieved Able to engage in interactions; Able to listen to others  --

## 2023-07-28 NOTE — NURSING NOTE
Patient approaches the nursing station requested PRN for anxiety r/t racing thoughts . PRN atarax given.

## 2023-07-28 NOTE — NURSING NOTE
Merline Lais requested PRN medication to help her sleep. 50mg Trazodone was provided.  Will continue to monitor and assess for safety

## 2023-07-28 NOTE — TREATMENT TEAM
Pt attended [id number] [facility] Recovery: recovery principles group  Pt scant response and needed por,ting.      07/28/23 1100   Activity/Group Checklist   Group Other (Comment)  ( Recovery: recovery principles group)   Attendance Attended; Other (Comment)  (late)   Attendance Duration (min) 31-45   Interactions Other (Comment)  (needed prompting)   Affect/Mood Constricted;Blunted/flat   Goals Achieved Identified feelings; Identified relapse prevention strategies; Able to listen to others; Able to engage in interactions; Able to self-disclose

## 2023-07-28 NOTE — NURSING NOTE
PRN Atarax was effective. Ely Love states her itchiness has subsided.  Will continue to monitor and assess for safey

## 2023-07-29 PROCEDURE — 99232 SBSQ HOSP IP/OBS MODERATE 35: CPT | Performed by: NURSE PRACTITIONER

## 2023-07-29 RX ORDER — MIRTAZAPINE 7.5 MG/1
7.5 TABLET, FILM COATED ORAL
Status: DISCONTINUED | OUTPATIENT
Start: 2023-07-29 | End: 2023-07-30

## 2023-07-29 RX ADMIN — HYDROXYZINE HYDROCHLORIDE 50 MG: 25 TABLET ORAL at 11:00

## 2023-07-29 RX ADMIN — SERTRALINE HYDROCHLORIDE 50 MG: 50 TABLET ORAL at 09:21

## 2023-07-29 RX ADMIN — PANTOPRAZOLE SODIUM 40 MG: 40 TABLET, DELAYED RELEASE ORAL at 05:59

## 2023-07-29 RX ADMIN — MONTELUKAST 10 MG: 10 TABLET, FILM COATED ORAL at 21:24

## 2023-07-29 RX ADMIN — CHOLECALCIFEROL TAB 25 MCG (1000 UNIT) 1000 UNITS: 25 TAB at 09:21

## 2023-07-29 RX ADMIN — LIOTHYRONINE SODIUM 5 MCG: 5 TABLET ORAL at 09:26

## 2023-07-29 RX ADMIN — HYDROXYZINE HYDROCHLORIDE 50 MG: 25 TABLET ORAL at 22:48

## 2023-07-29 RX ADMIN — MIRTAZAPINE 7.5 MG: 7.5 TABLET, FILM COATED ORAL at 21:24

## 2023-07-29 RX ADMIN — FAMOTIDINE 40 MG: 20 TABLET ORAL at 09:22

## 2023-07-29 RX ADMIN — LEVOTHYROXINE SODIUM 100 MCG: 100 TABLET ORAL at 05:59

## 2023-07-29 RX ADMIN — ARIPIPRAZOLE 10 MG: 10 TABLET ORAL at 09:21

## 2023-07-29 RX ADMIN — PREDNISONE 20 MG: 20 TABLET ORAL at 09:21

## 2023-07-29 NOTE — PLAN OF CARE
Problem: Alteration in Thoughts and Perception  Goal: Treatment Goal: Gain control of psychotic behaviors/thinking, reduce/eliminate presenting symptoms and demonstrate improved reality functioning upon discharge  Outcome: Progressing  Goal: Verbalize thoughts and feelings  Description: Interventions:  - Promote a nonjudgmental and trusting relationship with the patient through active listening and therapeutic communication  - Assess patient's level of functioning, behavior and potential for risk  - Engage patient in 1 on 1 interactions  - Encourage patient to express fears, feelings, frustrations, and discuss symptoms    - [place]ail patient to reality, help patient recognize reality-based thinking   - Administer medications as ordered and assess for potential side effects  - Provide the patient education related to the signs and symptoms of the illness and desired effects of prescribed medications  Outcome: Progressing  Goal: Refrain from acting on delusional thinking/internal stimuli  Description: Interventions:  - Monitor patient closely, per order   - Utilize least restrictive measures   - Set reasonable limits, give positive feedback for acceptable   - Administer medications as ordered and monitor of potential side effects  Outcome: Progressing  Goal: Agree to be compliant with medication regime, as prescribed and report medication side effects  Description: Interventions:  - Offer appropriate PRN medication and supervise ingestion; conduct AIMS, as needed   Outcome: Progressing  Goal: Attend and participate in unit activities, including therapeutic, recreational, and educational groups  Description: Interventions:  -Encourage Visitation and family involvement in care  Outcome: Progressing  Goal: Recognize dysfunctional thoughts, communicate reality-based thoughts at the time of discharge  Description: Interventions:  - Provide medication and psycho-education to assist patient in compliance and developing insight into his/her illness   Outcome: Progressing  Goal: Complete daily ADLs, including personal hygiene independently, as able  Description: Interventions:  - Observe, teach, and assist patient with ADLS  - Monitor and promote a balance of rest/activity, with adequate nutrition and elimination   Outcome: Progressing     Problem: Risk for Self Injury/Neglect  Goal: Treatment Goal: Remain safe during length of stay, learn and adopt new coping skills, and be free of self-injurious ideation, impulses and acts at the time of discharge  Outcome: Progressing  Goal: Verbalize thoughts and feelings  Description: Interventions:  - Assess and re-assess patient's lethality and potential for self-injury  - Engage patient in 1:1 interactions, daily, for a minimum of 15 minutes  - Encourage patient to express feelings, fears, frustrations, hopes  - Establish rapport/trust with patient   Outcome: Progressing  Goal: Refrain from harming self  Description: Interventions:  - Monitor patient closely, per order  - Develop a trusting relationship  - Supervise medication ingestion, monitor effects and side effects   Outcome: Progressing  Goal: Attend and participate in unit activities, including therapeutic, recreational, and educational groups  Description: Interventions:  - Provide therapeutic and educational activities daily, encourage attendance and participation, and document same in the medical record  - Obtain collateral information, encourage visitation and family involvement in care   Outcome: Progressing  Goal: Recognize maladaptive responses and adopt new coping mechanisms  Outcome: Progressing  Goal: Complete daily ADLs, including personal hygiene independently, as able  Description: Interventions:  - Observe, teach, and assist patient with ADLS  - Monitor and promote a balance of rest/activity, with adequate nutrition and elimination  Outcome: Progressing     Problem: Depression  Goal: Treatment Goal: Demonstrate behavioral control of depressive symptoms, verbalize feelings of improved mood/affect, and adopt new coping skills prior to discharge  Outcome: Progressing  Goal: Verbalize thoughts and feelings  Description: Interventions:  - Assess and re-assess patient's level of risk   - Engage patient in 1:1 interactions, daily, for a minimum of 15 minutes   - Encourage patient to express feelings, fears, frustrations, hopes   Outcome: Progressing  Goal: Refrain from harming self  Description: Interventions:  - Monitor patient closely, per order   - Supervise medication ingestion, monitor effects and side effects   Outcome: Progressing  Goal: Refrain from isolation  Description: Interventions:  - Develop a trusting relationship   - Encourage socialization   Outcome: Progressing  Goal: Refrain from self-neglect  Outcome: Progressing  Goal: Attend and participate in unit activities, including therapeutic, recreational, and educational groups  Description: Interventions:  - Provide therapeutic and educational activities daily, encourage attendance and participation, and document same in the medical record   Outcome: Progressing  Goal: Complete daily ADLs, including personal hygiene independently, as able  Description: Interventions:  - Observe, teach, and assist patient with ADLS  -  Monitor and promote a balance of rest/activity, with adequate nutrition and elimination   Outcome: Progressing     Problem: Anxiety  Goal: Anxiety is at manageable level  Description: Interventions:  - Assess and monitor patient's anxiety level. - Monitor for signs and symptoms (heart palpitations, chest pain, shortness of breath, headaches, nausea, feeling jumpy, restlessness, irritable, apprehensive). - Collaborate with interdisciplinary team and initiate plan and interventions as ordered.   - [place] patient to unit/surroundings  - Explain treatment plan  - Encourage participation in care  - Encourage verbalization of concerns/fears  - Identify coping mechanisms  - Assist in developing anxiety-reducing skills  - Administer/offer alternative therapies  - Limit or eliminate stimulants  Outcome: Progressing     Problem: Risk for Violence/Aggression Toward Others  Goal: Treatment Goal: Refrain from acts of violence/aggression during length of stay, and demonstrate improved impulse control at the time of discharge  Outcome: Progressing  Goal: Verbalize thoughts and feelings  Description: Interventions:  - Assess and re-assess patient's level of risk, every waking shift  - Engage patient in 1:1 interactions, daily, for a minimum of 15 minutes   - Allow patient to express feelings and frustrations in a safe and non-threatening manner   - Establish rapport/trust with patient   Outcome: Progressing  Goal: Refrain from harming others  Outcome: Progressing  Goal: Refrain from destructive acts on the environment or property  Outcome: Progressing  Goal: Control angry outbursts  Description: Interventions:  - Monitor patient closely, per order  - Ensure early verbal de-escalation  - Monitor prn medication needs  - Set reasonable/therapeutic limits, outline behavioral expectations, and consequences   - Provide a non-threatening milieu, utilizing the least restrictive interventions   Outcome: Progressing  Goal: Attend and participate in unit activities, including therapeutic, recreational, and educational groups  Description: Interventions:  - Provide therapeutic and educational activities daily, encourage attendance and participation, and document same in the medical record   Outcome: Progressing  Goal: Identify appropriate positive anger management techniques  Description: Interventions:  - Offer anger management and coping skills groups   - Staff will provide positive feedback for appropriate anger control  Outcome: Progressing

## 2023-07-29 NOTE — NURSING NOTE
Atarax 50 mg given at 2313 for anxiety with a Martin score of 19 was effective as patient was asleep on reassessment.

## 2023-07-29 NOTE — NURSING NOTE
Patient visible on the unit. Seen putting a puzzle together with peers but when assessed patient stated "the puzzle is making me anxious because there are a lot of conflicting personalities putting it together." Support provided and patient changed her activity to reading a book. Denies depression, SI/HI/AVH. Medication compliant. Encouraged to inform staff of any needs.

## 2023-07-29 NOTE — NURSING NOTE
Patient intermittently visible. Patient med compliant. Patient denies SI/HI/AH/VH, and depression but endorses anxiety. Patient has chronic belly pain. This writer mentioned to pt a food diary may help her while she is staying with us to identify what upsets her stomach. Patient agreed. Patient approached nurses station before bedtime meds stating she had a belly ache and headache and felt tremors coming on. No tremors noted. Writer asked patient what she felt would help and she asked for Trazadone for sleep. Per pt, "while sleeping the belly pain and headache aren't a problem". PRN Trazadone given with nighttime meds. Patient is med compliant. Vitals mostly stable except for chronic higher HR. No signs of distress noted.  Continual rounding maintained

## 2023-07-29 NOTE — PROGRESS NOTES
Progress Note - Behavioral Health   Javier Living 46 y.o. female MRN: [id number]  Unit/Bed#: [facility] 325-63 Encounter: [id number]    Assessment/Plan   Principal Problem:    Major depressive disorder with psychotic features [facility])  Active Problems:    Hypokalemia    Headache    Generalized anxiety disorder    Acquired hypothyroidism    Leukocytosis    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HCC)    Crohn's colitis ([address])    Seizure ([address])    Bladder tumor    Encephalopathy chronic    Medical clearance for psychiatric admission    Seasonal allergies    GERD without esophagitis    Benzodiazepine misuse  Patient was seen for continuing care and treatment. Case was discussed with the nursing staff. On examination today, the patient is seen lying in her bed and she is in some mild distress complaining of "belly pain". She has an ice pack over her abdominal area. She said this gives her "some relief". Still a lot of complaints of poor sleep, anxiety issues and ongoing complaints of loose stool especially after eating. So we are going to start her on 7.5 mg of mirtazapine at bedtime to see if this helps. She is in agreement. She will continue to take hydroxyzine as needed. Behavior over the last 24 hours has been the patient complaining of some abdominal discomfort and also complaining of anxiety. She has also had some poor sleep. Today we have added Remeron. She has good appetite but she complains after eating, she does experience abdominal cramping and has some loose stools. Not sure if this is diet related. No other complaints of medication side effects. No new medical concerns. Continue current meds and treatment with the addition of the mirtazapine to help with sleep and GI issues.  to visit today. Discharge planning and disposition are ongoing.         Mental Status Evaluation:  Appearance:  age appropriate and casually dressed   Behavior:  cooperative   Speech:  normal pitch and normal volume   Mood: anxious   Affect:  constricted   Thought Process:  logical   Associations: intact associations   Thought Content:  normal   Perceptual Disturbances: None   Risk Potential: Suicidal Ideations none  Homicidal Ideations none  Potential for Aggression No   Sensorium:  person, place, time/date and situation   Memory:  recent and remote memory grossly intact   Consciousness:  alert and awake    Attention: attention span and concentration were age appropriate   Insight:  age appropriate and good   Judgment: age appropriate and good   Gait/Station: normal gait/station   Motor Activity: no abnormal movements     Progress Toward Goals: Remains anxious    Recommended Treatment: Continue with group therapy, milieu therapy and occupational therapy. Risks, benefits and possible side effects of Medications:   Risks, benefits, and possible side effects of medications explained to patient and patient verbalizes understanding.       Medications:   current meds:   Current Facility-Administered Medications   Medication Dose Route Frequency   • acetaminophen (TYLENOL) tablet 650 mg  650 mg Oral Q4H PRN   • acetaminophen (TYLENOL) tablet 650 mg  650 mg Oral Q4H PRN   • acetaminophen (TYLENOL) tablet 975 mg  975 mg Oral Q6H PRN   • albuterol (PROVENTIL HFA,VENTOLIN HFA) inhaler 2 puff  2 puff Inhalation Q4H PRN   • ARIPiprazole (ABILIFY) tablet 10 mg  10 mg Oral Daily   • butalbital-acetaminophen-caffeine (FIORICET,ESGIC) -40 mg per tablet 1 tablet  1 tablet Oral Q8H PRN   • cetirizine (ZyrTEC) tablet 20 mg  20 mg Oral Daily   • cholecalciferol (VITAMIN D3) tablet 1,000 Units  1,000 Units Oral Daily   • diphenhydrAMINE (BENADRYL) injection 50 mg  50 mg Intravenous Q6H PRN   • EPINEPHrine PF (ADRENALIN) 1 mg/mL injection 0.3 mg  0.3 mg Intramuscular Daily PRN   • famotidine (PEPCID) tablet 40 mg  40 mg Oral Daily   • haloperidol lactate (HALDOL) injection 5 mg  5 mg Intramuscular Q4H PRN Max 4/day   • hydrOXYzine HCL (ATARAX) tablet 25 mg  25 mg Oral Q6H PRN Max 4/day   • hydrOXYzine HCL (ATARAX) tablet 50 mg  50 mg Oral Q6H PRN Max 4/day   • levothyroxine tablet 100 mcg  100 mcg Oral Early Morning   • liothyronine (CYTOMEL) tablet 5 mcg  5 mcg Oral Daily   • LORazepam (ATIVAN) injection 1 mg  1 mg Intramuscular Q6H PRN Max 3/day   • LORazepam (ATIVAN) tablet 0.5 mg  0.5 mg Oral BID PRN   • LORazepam (ATIVAN) tablet 1 mg  1 mg Oral Q6H PRN Max 3/day   • mirtazapine (REMERON) tablet 7.5 mg  7.5 mg Oral HS   • montelukast (SINGULAIR) tablet 10 mg  10 mg Oral HS   • pantoprazole (PROTONIX) EC tablet 40 mg  40 mg Oral Early Morning   • predniSONE tablet 20 mg  20 mg Oral Daily   • risperiDONE (RisperDAL) tablet 0.25 mg  0.25 mg Oral Q4H PRN Max 6/day   • risperiDONE (RisperDAL) tablet 0.5 mg  0.5 mg Oral Q4H PRN Max 3/day   • risperiDONE (RisperDAL) tablet 1 mg  1 mg Oral Q2H PRN Max 3/day   • sertraline (ZOLOFT) tablet 50 mg  50 mg Oral Daily   • traMADol (ULTRAM) tablet 50 mg  50 mg Oral Q6H PRN   . Labs: I have personally reviewed all pertinent laboratory/tests results.    Most Recent Labs:   Lab Results   Component Value Date    WBC 14.83 (H) 07/25/2023    RBC 5.16 (H) 07/25/2023    HGB 15.1 07/25/2023    HCT 47.0 (H) 07/25/2023     (H) 07/25/2023    RDW 15.3 (H) 07/25/2023    NEUTROABS 10.80 (H) 07/25/2023    SODIUM 138 07/28/2023    K 4.9 07/28/2023     07/28/2023    CO2 23 07/28/2023    BUN 13 07/28/2023    CREATININE 0.64 07/28/2023    GLUC 87 07/28/2023    GLUF 87 07/28/2023    CALCIUM 8.7 07/28/2023    AST 22 07/25/2023    ALT 39 07/25/2023    ALKPHOS 55 07/25/2023    TP 7.4 07/25/2023    ALB 4.4 07/25/2023    TBILI 0.55 07/25/2023    CHOLESTEROL 196 03/12/2022    HDL 77 03/12/2022    TRIG 69 03/12/2022    LDLCALC 105 (H) 03/12/2022    RIJ6FFFSSETN 0.342 (L) 07/21/2023    FREET4 0.81 07/21/2023    T3FREE 1.85 (L) 04/19/2023    PREGSERUM Negative 12/02/2021    HCGQUANT <2 08/26/2017    HGBA1C 5.4 09/01/2022     09/01/2022       Counseling / Coordination of Care  Total floor / unit time spent today 30 minutes. Greater than 50% of total time was spent with the patient and / or family counseling and / or coordination of care.  A description of the counseling / coordination of care: Medication management, treatment and chart review

## 2023-07-29 NOTE — NURSING NOTE
Pt appeared anxious in a.m., reported due to gastrointestinal discomfort following breakfast. Pt accepted atarax for symptoms prior to lunch, pt  reported effective with no distress reported following lunch. Pt visited with  following, reporting having positive visit. Pt appeared brighter in afternoon, social with peers and working on puzzle in dayroom. No additional symptoms reported during shift.

## 2023-07-29 NOTE — TREATMENT TEAM
07/28/23 2311   Martin Anxiety Scale   Anxious Mood 2   Tension 3   Fears 3   Insomnia 1   Intellectual 1   Depressed Mood 1   Somatic Complaints: Muscular 1   Somatic Complaints: Sensory 0   Cardiovascular Symptoms 1   Respiratory Symptoms 0   Gastrointestinal Symptoms 0   Genitourinary Symptoms 0   Autonomic Symptoms 3   Behavior at Interview 3   Martin Anxiety Score 19     PRN Atarax given for Ham score 19

## 2023-07-30 PROCEDURE — 99232 SBSQ HOSP IP/OBS MODERATE 35: CPT | Performed by: NURSE PRACTITIONER

## 2023-07-30 RX ORDER — MIRTAZAPINE 15 MG/1
15 TABLET, FILM COATED ORAL
Status: DISCONTINUED | OUTPATIENT
Start: 2023-07-30 | End: 2023-08-05

## 2023-07-30 RX ADMIN — LEVOTHYROXINE SODIUM 100 MCG: 100 TABLET ORAL at 05:52

## 2023-07-30 RX ADMIN — CHOLECALCIFEROL TAB 25 MCG (1000 UNIT) 1000 UNITS: 25 TAB at 09:23

## 2023-07-30 RX ADMIN — HYDROXYZINE HYDROCHLORIDE 25 MG: 25 TABLET ORAL at 09:42

## 2023-07-30 RX ADMIN — FAMOTIDINE 40 MG: 20 TABLET ORAL at 09:23

## 2023-07-30 RX ADMIN — LORAZEPAM 1 MG: 1 TABLET ORAL at 11:17

## 2023-07-30 RX ADMIN — MONTELUKAST 10 MG: 10 TABLET, FILM COATED ORAL at 21:21

## 2023-07-30 RX ADMIN — ARIPIPRAZOLE 10 MG: 10 TABLET ORAL at 09:23

## 2023-07-30 RX ADMIN — PANTOPRAZOLE SODIUM 40 MG: 40 TABLET, DELAYED RELEASE ORAL at 05:52

## 2023-07-30 RX ADMIN — LIOTHYRONINE SODIUM 5 MCG: 5 TABLET ORAL at 09:25

## 2023-07-30 RX ADMIN — MIRTAZAPINE 15 MG: 15 TABLET, FILM COATED ORAL at 21:21

## 2023-07-30 RX ADMIN — PREDNISONE 20 MG: 20 TABLET ORAL at 09:23

## 2023-07-30 RX ADMIN — CETIRIZINE HYDROCHLORIDE 20 MG: 10 TABLET, FILM COATED ORAL at 09:24

## 2023-07-30 RX ADMIN — SERTRALINE HYDROCHLORIDE 50 MG: 50 TABLET ORAL at 09:23

## 2023-07-30 NOTE — NURSING NOTE
Pt reported anxiety following breakfast, accepted atarax for symptoms. Pt stated she would try to rest in room. Pt reported prn ineffective when reassessed.

## 2023-07-30 NOTE — NURSING NOTE
Pt appeared tearful while attempting to rest in room. Pt reported feeling "afraid to go to sleep" due to having seizure in past. Pt also reported tremor, reported severe anxiety. Pt accepted ativan po for symptoms, was able to rest in room. Pt reported medication was effective, appeared calm while eating lunch in dayroom. Visible in dayroom during afternoon, social and engaged in group activity. Pt denies having additional symptoms currently.

## 2023-07-30 NOTE — PLAN OF CARE
Problem: Alteration in Thoughts and Perception  Goal: Treatment Goal: Gain control of psychotic behaviors/thinking, reduce/eliminate presenting symptoms and demonstrate improved reality functioning upon discharge  Outcome: Progressing  Goal: Verbalize thoughts and feelings  Description: Interventions:  - Promote a nonjudgmental and trusting relationship with the patient through active listening and therapeutic communication  - Assess patient's level of functioning, behavior and potential for risk  - Engage patient in 1 on 1 interactions  - Encourage patient to express fears, feelings, frustrations, and discuss symptoms    - [place] patient to reality, help patient recognize reality-based thinking   - Administer medications as ordered and assess for potential side effects  - Provide the patient education related to the signs and symptoms of the illness and desired effects of prescribed medications  Outcome: Progressing  Goal: Refrain from acting on delusional thinking/internal stimuli  Description: Interventions:  - Monitor patient closely, per order   - Utilize least restrictive measures   - Set reasonable limits, give positive feedback for acceptable   - Administer medications as ordered and monitor of potential side effects  Outcome: Progressing  Goal: Agree to be compliant with medication regime, as prescribed and report medication side effects  Description: Interventions:  - Offer appropriate PRN medication and supervise ingestion; conduct AIMS, as needed   Outcome: Progressing  Goal: Attend and participate in unit activities, including therapeutic, recreational, and educational groups  Description: Interventions:  -Encourage Visitation and family involvement in care  Outcome: Progressing  Goal: Recognize dysfunctional thoughts, communicate reality-based thoughts at the time of discharge  Description: Interventions:  - Provide medication and psycho-education to assist patient in compliance and developing insight into his/her illness   Outcome: Progressing  Goal: Complete daily ADLs, including personal hygiene independently, as able  Description: Interventions:  - Observe, teach, and assist patient with ADLS  - Monitor and promote a balance of rest/activity, with adequate nutrition and elimination   Outcome: Progressing     Problem: Risk for Self Injury/Neglect  Goal: Treatment Goal: Remain safe during length of stay, learn and adopt new coping skills, and be free of self-injurious ideation, impulses and acts at the time of discharge  Outcome: Progressing  Goal: Verbalize thoughts and feelings  Description: Interventions:  - Assess and re-assess patient's lethality and potential for self-injury  - Engage patient in 1:1 interactions, daily, for a minimum of 15 minutes  - Encourage patient to express feelings, fears, frustrations, hopes  - Establish rapport/trust with patient   Outcome: Progressing  Goal: Refrain from harming self  Description: Interventions:  - Monitor patient closely, per order  - Develop a trusting relationship  - Supervise medication ingestion, monitor effects and side effects   Outcome: Progressing  Goal: Attend and participate in unit activities, including therapeutic, recreational, and educational groups  Description: Interventions:  - Provide therapeutic and educational activities daily, encourage attendance and participation, and document same in the medical record  - Obtain collateral information, encourage visitation and family involvement in care   Outcome: Progressing  Goal: Recognize maladaptive responses and adopt new coping mechanisms  Outcome: Progressing  Goal: Complete daily ADLs, including personal hygiene independently, as able  Description: Interventions:  - Observe, teach, and assist patient with ADLS  - Monitor and promote a balance of rest/activity, with adequate nutrition and elimination  Outcome: Progressing     Problem: Anxiety  Goal: Anxiety is at manageable level  Description: Interventions:  - Assess and monitor patient's anxiety level. - Monitor for signs and symptoms (heart palpitations, chest pain, shortness of breath, headaches, nausea, feeling jumpy, restlessness, irritable, apprehensive). - Collaborate with interdisciplinary team and initiate plan and interventions as ordered.   - [place] patient to unit/surroundings  - Explain treatment plan  - Encourage participation in care  - Encourage verbalization of concerns/fears  - Identify coping mechanisms  - Assist in developing anxiety-reducing skills  - Administer/offer alternative therapies  - Limit or eliminate stimulants  Outcome: Progressing

## 2023-07-30 NOTE — PROGRESS NOTES
Progress Note - Behavioral Health   Tyrone Milan 46 y.o. female MRN: [id number]  Unit/Bed#: [facility] 708-06 Encounter: [id number]    Assessment/Plan   Principal Problem:    Major depressive disorder with psychotic features [facility])  Active Problems:    Hypokalemia    Headache    Generalized anxiety disorder    Acquired hypothyroidism    Leukocytosis    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HCC)    Crohn's colitis ([address])    Seizure ([address])    Bladder tumor    Encephalopathy chronic    Medical clearance for psychiatric admission    Seasonal allergies    GERD without esophagitis    Benzodiazepine misuse  Patient was seen for continuing care and treatment. Case was discussed with the nursing staff. On examination today, the patient tells me that possibly, the Remeron that she took last night at bedtime might be helpful. This morning, she was able to tolerate breakfast without running to the bathroom. No adverse effect noted. So we will increase it up to 15 mg at bedtime. She is agreeable. Last night she did ask for the Atarax but did sleep well. Also tells me she had a good day yesterday with a visit with her . Behavior over the last 24 hours has shown some improvement. Patient is reporting better sleep, slightly less focused on abdominal cramping and improved appetite. No medication side effects and no new medical concerns.     Mental Status Evaluation:  Appearance:  age appropriate and casually dressed   Behavior:  cooperative   Speech:  normal pitch and normal volume   Mood:  Improving   Affect:  mood-congruent   Thought Process:  goal directed and logical   Associations: intact associations   Thought Content:  No overt delusions   Perceptual Disturbances: None   Risk Potential: Suicidal Ideations none  Homicidal Ideations none  Potential for Aggression No   Sensorium:  person, place, time/date and situation   Memory:  recent and remote memory grossly intact   Consciousness:  alert and awake    Attention: attention span and concentration were age appropriate   Insight:  fair   Judgment: fair   Gait/Station: normal gait/station   Motor Activity: no abnormal movements     Progress Toward Goals: Some improvement noted with the start of Remeron. Recommended Treatment: Continue with group therapy, milieu therapy and occupational therapy. Risks, benefits and possible side effects of Medications:   Risks, benefits, and possible side effects of medications explained to patient and patient verbalizes understanding.       Medications:   current meds:   Current Facility-Administered Medications   Medication Dose Route Frequency   • acetaminophen (TYLENOL) tablet 650 mg  650 mg Oral Q4H PRN   • acetaminophen (TYLENOL) tablet 650 mg  650 mg Oral Q4H PRN   • acetaminophen (TYLENOL) tablet 975 mg  975 mg Oral Q6H PRN   • albuterol (PROVENTIL HFA,VENTOLIN HFA) inhaler 2 puff  2 puff Inhalation Q4H PRN   • ARIPiprazole (ABILIFY) tablet 10 mg  10 mg Oral Daily   • butalbital-acetaminophen-caffeine (FIORICET,ESGIC) -40 mg per tablet 1 tablet  1 tablet Oral Q8H PRN   • cetirizine (ZyrTEC) tablet 20 mg  20 mg Oral Daily   • cholecalciferol (VITAMIN D3) tablet 1,000 Units  1,000 Units Oral Daily   • diphenhydrAMINE (BENADRYL) injection 50 mg  50 mg Intravenous Q6H PRN   • EPINEPHrine PF (ADRENALIN) 1 mg/mL injection 0.3 mg  0.3 mg Intramuscular Daily PRN   • famotidine (PEPCID) tablet 40 mg  40 mg Oral Daily   • haloperidol lactate (HALDOL) injection 5 mg  5 mg Intramuscular Q4H PRN Max 4/day   • hydrOXYzine HCL (ATARAX) tablet 25 mg  25 mg Oral Q6H PRN Max 4/day   • hydrOXYzine HCL (ATARAX) tablet 50 mg  50 mg Oral Q6H PRN Max 4/day   • levothyroxine tablet 100 mcg  100 mcg Oral Early Morning   • liothyronine (CYTOMEL) tablet 5 mcg  5 mcg Oral Daily   • LORazepam (ATIVAN) injection 1 mg  1 mg Intramuscular Q6H PRN Max 3/day   • LORazepam (ATIVAN) tablet 0.5 mg  0.5 mg Oral BID PRN   • LORazepam (ATIVAN) tablet 1 mg  1 mg Oral Q6H PRN Max 3/day   • mirtazapine (REMERON) tablet 15 mg  15 mg Oral HS   • montelukast (SINGULAIR) tablet 10 mg  10 mg Oral HS   • pantoprazole (PROTONIX) EC tablet 40 mg  40 mg Oral Early Morning   • predniSONE tablet 20 mg  20 mg Oral Daily   • risperiDONE (RisperDAL) tablet 0.25 mg  0.25 mg Oral Q4H PRN Max 6/day   • risperiDONE (RisperDAL) tablet 0.5 mg  0.5 mg Oral Q4H PRN Max 3/day   • risperiDONE (RisperDAL) tablet 1 mg  1 mg Oral Q2H PRN Max 3/day   • sertraline (ZOLOFT) tablet 50 mg  50 mg Oral Daily   • traMADol (ULTRAM) tablet 50 mg  50 mg Oral Q6H PRN   . Labs: I have personally reviewed all pertinent laboratory/tests results. Most Recent Labs:   Lab Results   Component Value Date    WBC 14.83 (H) 07/25/2023    RBC 5.16 (H) 07/25/2023    HGB 15.1 07/25/2023    HCT 47.0 (H) 07/25/2023     (H) 07/25/2023    RDW 15.3 (H) 07/25/2023    NEUTROABS 10.80 (H) 07/25/2023    SODIUM 138 07/28/2023    K 4.9 07/28/2023     07/28/2023    CO2 23 07/28/2023    BUN 13 07/28/2023    CREATININE 0.64 07/28/2023    GLUC 87 07/28/2023    GLUF 87 07/28/2023    CALCIUM 8.7 07/28/2023    AST 22 07/25/2023    ALT 39 07/25/2023    ALKPHOS 55 07/25/2023    TP 7.4 07/25/2023    ALB 4.4 07/25/2023    TBILI 0.55 07/25/2023    CHOLESTEROL 196 03/12/2022    HDL 77 03/12/2022    TRIG 69 03/12/2022    LDLCALC 105 (H) 03/12/2022    ZUY2JCYYLSXH 0.342 (L) 07/21/2023    FREET4 0.81 07/21/2023    T3FREE 1.85 (L) 04/19/2023    PREGSERUM Negative 12/02/2021    HCGQUANT <2 08/26/2017    HGBA1C 5.4 09/01/2022     09/01/2022       Counseling / Coordination of Care  Total floor / unit time spent today 30 minutes. Greater than 50% of total time was spent with the patient and / or family counseling and / or coordination of care.  A description of the counseling / coordination of care: Medication management, treatment and chart review

## 2023-07-31 PROCEDURE — 99232 SBSQ HOSP IP/OBS MODERATE 35: CPT | Performed by: STUDENT IN AN ORGANIZED HEALTH CARE EDUCATION/TRAINING PROGRAM

## 2023-07-31 RX ADMIN — LIOTHYRONINE SODIUM 5 MCG: 5 TABLET ORAL at 09:00

## 2023-07-31 RX ADMIN — ARIPIPRAZOLE 10 MG: 10 TABLET ORAL at 08:33

## 2023-07-31 RX ADMIN — FAMOTIDINE 40 MG: 20 TABLET ORAL at 08:33

## 2023-07-31 RX ADMIN — PANTOPRAZOLE SODIUM 40 MG: 40 TABLET, DELAYED RELEASE ORAL at 06:24

## 2023-07-31 RX ADMIN — MONTELUKAST 10 MG: 10 TABLET, FILM COATED ORAL at 21:04

## 2023-07-31 RX ADMIN — LORAZEPAM 1 MG: 1 TABLET ORAL at 13:35

## 2023-07-31 RX ADMIN — CHOLECALCIFEROL TAB 25 MCG (1000 UNIT) 1000 UNITS: 25 TAB at 08:33

## 2023-07-31 RX ADMIN — CETIRIZINE HYDROCHLORIDE 20 MG: 10 TABLET, FILM COATED ORAL at 09:02

## 2023-07-31 RX ADMIN — PREDNISONE 20 MG: 20 TABLET ORAL at 08:33

## 2023-07-31 RX ADMIN — SERTRALINE HYDROCHLORIDE 50 MG: 50 TABLET ORAL at 08:33

## 2023-07-31 RX ADMIN — MIRTAZAPINE 15 MG: 15 TABLET, FILM COATED ORAL at 21:04

## 2023-07-31 RX ADMIN — LEVOTHYROXINE SODIUM 100 MCG: 100 TABLET ORAL at 06:24

## 2023-07-31 NOTE — PROGRESS NOTES
07/31/23 0831   Team Meeting   Meeting Type Daily Rounds   Initial Conference Date 07/31/23   Team Members Present   Team Members Present Physician;Nurse;;; Occupational Therapist   Physician Team Member [id number] Team Member [facility] Management Team Member [facility] [address] Work Team Member Tamela   OT Team Member Javier Vance   Patient/Family Present   Patient Present No   Patient's Family Present No     Visit with  this weekend went well. She is a bit brighter on approach. Discharge is pending.

## 2023-07-31 NOTE — NURSING NOTE
Patient visible on the unit. Social with peers. Reports anxiety. No tremors noted. Medication compliant. Encouraged to inform staff of any needs.

## 2023-07-31 NOTE — PLAN OF CARE
Problem: Alteration in Thoughts and Perception  Goal: Treatment Goal: Gain control of psychotic behaviors/thinking, reduce/eliminate presenting symptoms and demonstrate improved reality functioning upon discharge  Outcome: Progressing  Goal: Verbalize thoughts and feelings  Description: Interventions:  - Promote a nonjudgmental and trusting relationship with the patient through active listening and therapeutic communication  - Assess patient's level of functioning, behavior and potential for risk  - Engage patient in 1 on 1 interactions  - Encourage patient to express fears, feelings, frustrations, and discuss symptoms    - [place] patient to reality, help patient recognize reality-based thinking   - Administer medications as ordered and assess for potential side effects  - Provide the patient education related to the signs and symptoms of the illness and desired effects of prescribed medications  Outcome: Progressing  Goal: Refrain from acting on delusional thinking/internal stimuli  Description: Interventions:  - Monitor patient closely, per order   - Utilize least restrictive measures   - Set reasonable limits, give positive feedback for acceptable   - Administer medications as ordered and monitor of potential side effects  Outcome: Progressing  Goal: Agree to be compliant with medication regime, as prescribed and report medication side effects  Description: Interventions:  - Offer appropriate PRN medication and supervise ingestion; conduct AIMS, as needed   Outcome: Progressing  Goal: Attend and participate in unit activities, including therapeutic, recreational, and educational groups  Description: Interventions:  -Encourage Visitation and family involvement in care  Outcome: Progressing  Goal: Recognize dysfunctional thoughts, communicate reality-based thoughts at the time of discharge  Description: Interventions:  - Provide medication and psycho-education to assist patient in compliance and developing insight into his/her illness   Outcome: Progressing  Goal: Complete daily ADLs, including personal hygiene independently, as able  Description: Interventions:  - Observe, teach, and assist patient with ADLS  - Monitor and promote a balance of rest/activity, with adequate nutrition and elimination   Outcome: Progressing     Goal: Participates in unit activities  Description: Interventions:  - Provide therapeutic environment   - Provide required programming   - Redirect inappropriate behaviors   Outcome: Progressing  Goal: Patient/Family participate in treatment and DC plans  Description: Interventions:  - Provide therapeutic environment  Outcome: Progressing  Goal: Patient/Family verbalizes awareness of resources  Outcome: Progressing  Goal: Understands least restrictive measures  Description: Interventions:  - Utilize least restrictive behavior  Outcome: Progressing  Goal: Free from restraint events  Description: - Utilize least restrictive measures   - Provide behavioral interventions   - Redirect inappropriate behaviors   Outcome: Progressing     Problem: Risk for Self Injury/Neglect  Goal: Treatment Goal: Remain safe during length of stay, learn and adopt new coping skills, and be free of self-injurious ideation, impulses and acts at the time of discharge  Outcome: Progressing  Goal: Verbalize thoughts and feelings  Description: Interventions:  - Assess and re-assess patient's lethality and potential for self-injury  - Engage patient in 1:1 interactions, daily, for a minimum of 15 minutes  - Encourage patient to express feelings, fears, frustrations, hopes  - Establish rapport/trust with patient   Outcome: Progressing  Goal: Refrain from harming self  Description: Interventions:  - Monitor patient closely, per order  - Develop a trusting relationship  - Supervise medication ingestion, monitor effects and side effects   Outcome: Progressing  Goal: Attend and participate in unit activities, including therapeutic, recreational, and educational groups  Description: Interventions:  - Provide therapeutic and educational activities daily, encourage attendance and participation, and document same in the medical record  - Obtain collateral information, encourage visitation and family involvement in care   Outcome: Progressing  Goal: Recognize maladaptive responses and adopt new coping mechanisms  Outcome: Progressing  Goal: Complete daily ADLs, including personal hygiene independently, as able  Description: Interventions:  - Observe, teach, and assist patient with ADLS  - Monitor and promote a balance of rest/activity, with adequate nutrition and elimination  Outcome: Progressing     Problem: Depression  Goal: Treatment Goal: Demonstrate behavioral control of depressive symptoms, verbalize feelings of improved mood/affect, and adopt new coping skills prior to discharge  Outcome: Progressing  Goal: Verbalize thoughts and feelings  Description: Interventions:  - Assess and re-assess patient's level of risk   - Engage patient in 1:1 interactions, daily, for a minimum of 15 minutes   - Encourage patient to express feelings, fears, frustrations, hopes   Outcome: Progressing  Goal: Refrain from harming self  Description: Interventions:  - Monitor patient closely, per order   - Supervise medication ingestion, monitor effects and side effects   Outcome: Progressing  Goal: Refrain from isolation  Description: Interventions:  - Develop a trusting relationship   - Encourage socialization   Outcome: Progressing  Goal: Refrain from self-neglect  Outcome: Progressing  Goal: Attend and participate in unit activities, including therapeutic, recreational, and educational groups  Description: Interventions:  - Provide therapeutic and educational activities daily, encourage attendance and participation, and document same in the medical record   Outcome: Progressing  Goal: Complete daily ADLs, including personal hygiene independently, as able  Description: Interventions:  - Observe, teach, and assist patient with ADLS  -  Monitor and promote a balance of rest/activity, with adequate nutrition and elimination   Outcome: Progressing     Problem: Anxiety  Goal: Anxiety is at manageable level  Description: Interventions:  - Assess and monitor patient's anxiety level. - Monitor for signs and symptoms (heart palpitations, chest pain, shortness of breath, headaches, nausea, feeling jumpy, restlessness, irritable, apprehensive). - Collaborate with interdisciplinary team and initiate plan and interventions as ordered.   - [place] patient to unit/surroundings  - Explain treatment plan  - Encourage participation in care  - Encourage verbalization of concerns/fears  - Identify coping mechanisms  - Assist in developing anxiety-reducing skills  - Administer/offer alternative therapies  - Limit or eliminate stimulants  Outcome: Progressing     Problem: Risk for Violence/Aggression Toward Others  Goal: Verbalize thoughts and feelings  Description: Interventions:  - Assess and re-assess patient's level of risk, every waking shift  - Engage patient in 1:1 interactions, daily, for a minimum of 15 minutes   - Allow patient to express feelings and frustrations in a safe and non-threatening manner   - Establish rapport/trust with patient   Outcome: Progressing  Goal: Refrain from harming others  Outcome: Progressing  Goal: Refrain from destructive acts on the environment or property  Outcome: Progressing  Goal: Control angry outbursts  Description: Interventions:  - Monitor patient closely, per order  - Ensure early verbal de-escalation  - Monitor prn medication needs  - Set reasonable/therapeutic limits, outline behavioral expectations, and consequences   - Provide a non-threatening milieu, utilizing the least restrictive interventions   Outcome: Progressing  Goal: Attend and participate in unit activities, including therapeutic, recreational, and educational groups  Description: Interventions:  - Provide therapeutic and educational activities daily, encourage attendance and participation, and document same in the medical record   Outcome: Progressing  Goal: Identify appropriate positive anger management techniques  Description: Interventions:  - Offer anger management and coping skills groups   - Staff will provide positive feedback for appropriate anger control  Outcome: Progressing     Problem: Alteration in Orientation  Goal: Treatment Goal: Demonstrate a reduction of confusion and improved orientation to person, place, time and/or situation upon discharge, according to optimum baseline/ability  Outcome: Progressing  Goal: Interact with staff daily  Description: Interventions:  - Assess and re-assess patient's level of orientation  - Engage patient in 1 on 1 interactions, daily, for a minimum of 15 minutes   - Establish rapport/trust with patient   Outcome: Progressing  Goal: Express concerns related to confused thinking related to:  Description: Interventions:  - Encourage patient to express feelings, fears, frustrations, hopes  - Assign consistent caregivers   - [place]/re-orient patient as needed  - Allow comfort items, as appropriate  - Provide visual cues, signs, etc.   Outcome: Progressing  Goal: Allow medical examinations, as recommended  Description: Interventions:  - Provide physical/neurological exams and/or referrals, per provider   Outcome: Progressing  Goal: Cooperate with recommended testing/procedures  Description: Interventions:  - Determine need for ancillary testing  - Observe for mental status changes  - Implement falls/precaution protocol   Outcome: Progressing  Goal: Attend and participate in unit activities, including therapeutic, recreational, and educational groups  Description: Interventions:  - Provide therapeutic and educational activities daily, encourage attendance and participation, and document same in the medical record   - Provide appropriate opportunities for reminiscence   - Provide a consistent daily routine   - Encourage family contact/visitation   Outcome: Progressing  Goal: Complete daily ADLs, including personal hygiene independently, as able  Description: Interventions:  - Observe, teach, and assist patient with ADLS  Outcome: Progressing     Problem: Individualized Interventions  Goal: Patient will verbalize appropriate use of telephone within 5 days  Description: Interventions:  - Treatment team to determine use of supervised phone privileges   Outcome: Progressing  Goal: Patient will verbalize need for hospitalization and will no longer attempt elopement within 5 days  Description: Interventions:  - Ongoing education to help patient understand need for hospitalization  Outcome: Progressing  Goal: Patient will recognize inappropriate behaviors and develop alternative behaviors within 5 days  Description: Interventions:  - Patient in collaboration with Treatment Team will develop a behavior management plan to help identify effective coping skills to deal with stressors  Outcome: Progressing

## 2023-07-31 NOTE — NURSING NOTE
Pt visualized laying quietly in her room. Pt reported 6/10 depression and anxiety this morning that increased throughout the day. The patient felt that her anxiety was from "working through" all of her thoughts. The patient was seen walking in the hallway with another resident and attempted to utilize other coping skills. This afternoon, the patient reported that her anxiety was better. The patient and I talked at length regarding her coping skills and motivating factors. She explained that she loved to swim and was looking forward to "feeling better" in order to go home to her family. The patient appears less anxious and is expressive. Will continue to monitor mood and behavior.

## 2023-07-31 NOTE — PLAN OF CARE
Pt. Attended one morning group and was socially appropriate and well focused in the structured topic/task.   Problem: Ineffective Coping  Goal: Participates in unit activities  Description: Interventions:  - Provide therapeutic environment   - Provide required programming   - Redirect inappropriate behaviors   Outcome: Progressing

## 2023-07-31 NOTE — TREATMENT TEAM
07/31/23 1335   Martin Anxiety Scale   Anxious Mood 3   Tension 3   Fears 1   Insomnia 0   Intellectual 3   Depressed Mood 2   Somatic Complaints: Muscular 3   Somatic Complaints: Sensory 2   Cardiovascular Symptoms 2   Respiratory Symptoms 2   Gastrointestinal Symptoms 1   Genitourinary Symptoms 1   Autonomic Symptoms 3   Behavior at Interview 3   Martin Anxiety Score 29     Pt approached this writer and requested that I speak to her privately in her room. The patient then stated that she was "very anxious." Pt began to visibly shake and cry. Pt stated that she had attempted to "cope all morning" by using her journal and distraction. Pt identified that she felt a "seizure coming on" and requested PRN medication. PRN Ativan 1mg was administered. Will monitor for changes in anxiety levels and patient presentation.

## 2023-07-31 NOTE — NURSING NOTE
When attempting to reassess pts anxiety level following PRN Ativan, pt visualized lying in bed with eyes closed. Will continue to monitor anxiety levels.

## 2023-07-31 NOTE — PROGRESS NOTES
Progress Note - Behavioral Health   Marlen Vanegas 46 y.o. female MRN: [id number]  Unit/Bed#: [facility] 878-82 Encounter: [id number]    The patient was seen for continuing care and reviewed with treatment team.   She is brighter, less confused. She reports the meeting with her  on Saturday went well. He brought her an anniversary card. She reports she continues to struggle with " reality." AH  Of noises are less and intermittent. She is engaging more with peers. She has started Journaling. She was able to talk about some of her stressors today. Sleep- slept better with Trazodone PRN last night  Appetite - improved  Energy: fair  No suicidal or homicidal ideations. ROS:  None reported, she reports she is less tremulous    /84 (BP Location: Right arm)   Pulse (!) 110   Temp 97.5 °F (36.4 °C) (Temporal)   Resp 18   Ht 5' 5" (1.651 m)   Wt 65.9 kg (145 lb 3.2 oz)   SpO2 100%   BMI 24.16 kg/m²     Current Mental Status Evaluation:  Appearance:  Adequate hygiene and grooming   Behavior:  Calm, Cooperative, Pleasant, Superficial and Dismissive   Mood:  anxious and improving   Affect: constricted, anxious   Speech: Soft and Slow   Thought Process:  Goal directed and coherent , Circumstantial at times   Thought Content:  Paranoid and mistrustful   Perceptual Disturbances: Denies hallucinations and does not appear to be responding to internal stimuli,    Risk Potential: No suicidal or homicidal ideation   Orientation:   Patient is alert, awake and oriented x 3   Patient has limited insight into her illness, judgment and impulse control are limited    No results found for this or any previous visit (from the past 72 hour(s)). Progress Toward Goals: Progressing. Pt  is journaling and engaging in groups and with peers.      Assessment     Principal Problem:    Major depressive disorder with psychotic features [facility])  Active Problems:    Hypokalemia    Headache    Generalized anxiety disorder    Acquired hypothyroidism    Leukocytosis    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HCC)    Crohn's colitis (HCC)    Seizure ([address])    Bladder tumor    Encephalopathy chronic    Medical clearance for psychiatric admission    Seasonal allergies    GERD without esophagitis    Benzodiazepine misuse        Plan :    - Medications;   Psychiatric:   Increase Sertraline 75mg daily   Abilify 10mg daily for psychosis.      Medical: per SLIM    -Therapy: occupational therapy, milieu and group therapy  - Legal: 154   -Disposition:Home when stable

## 2023-08-01 PROCEDURE — 97162 PT EVAL MOD COMPLEX 30 MIN: CPT

## 2023-08-01 PROCEDURE — 99232 SBSQ HOSP IP/OBS MODERATE 35: CPT | Performed by: STUDENT IN AN ORGANIZED HEALTH CARE EDUCATION/TRAINING PROGRAM

## 2023-08-01 RX ADMIN — PREDNISONE 20 MG: 20 TABLET ORAL at 08:40

## 2023-08-01 RX ADMIN — CHOLECALCIFEROL TAB 25 MCG (1000 UNIT) 1000 UNITS: 25 TAB at 08:40

## 2023-08-01 RX ADMIN — MIRTAZAPINE 15 MG: 15 TABLET, FILM COATED ORAL at 21:00

## 2023-08-01 RX ADMIN — PANTOPRAZOLE SODIUM 40 MG: 40 TABLET, DELAYED RELEASE ORAL at 06:11

## 2023-08-01 RX ADMIN — CETIRIZINE HYDROCHLORIDE 20 MG: 10 TABLET, FILM COATED ORAL at 08:50

## 2023-08-01 RX ADMIN — MONTELUKAST 10 MG: 10 TABLET, FILM COATED ORAL at 21:00

## 2023-08-01 RX ADMIN — SERTRALINE HYDROCHLORIDE 75 MG: 50 TABLET ORAL at 08:40

## 2023-08-01 RX ADMIN — LEVOTHYROXINE SODIUM 100 MCG: 100 TABLET ORAL at 06:11

## 2023-08-01 RX ADMIN — HYDROXYZINE HYDROCHLORIDE 50 MG: 25 TABLET ORAL at 21:28

## 2023-08-01 RX ADMIN — FAMOTIDINE 40 MG: 20 TABLET ORAL at 08:41

## 2023-08-01 RX ADMIN — LIOTHYRONINE SODIUM 5 MCG: 5 TABLET ORAL at 08:51

## 2023-08-01 RX ADMIN — ACETAMINOPHEN 975 MG: 325 TABLET ORAL at 22:32

## 2023-08-01 RX ADMIN — ARIPIPRAZOLE 10 MG: 10 TABLET ORAL at 08:40

## 2023-08-01 NOTE — NURSING NOTE
Patient visible in the dayroom and social with peers. Endorses anxiety and depression but denies SI/HI/AVH. Reported that her newly scheduled Remeron has been helping her sleep. Medication compliant. Encouraged to inform staff of any needs.

## 2023-08-01 NOTE — PROGRESS NOTES
Progress Note - Behavioral Health   [facility] [place] 46 y.o. female MRN: [id number]  Unit/Bed#: [facility] 868-89 Encounter: [id number]    Assessment/Plan   Principal Problem:    Major depressive disorder with psychotic features [facility])  Active Problems:    Hypokalemia    Headache    Generalized anxiety disorder    Acquired hypothyroidism    Leukocytosis    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HCC)    Crohn's colitis ([address])    Seizure ([address])    Bladder tumor    Encephalopathy chronic    Medical clearance for psychiatric admission    Seasonal allergies    GERD without esophagitis    Benzodiazepine misuse      Behavior over the last 24 hours:  Improving  Sleep: fluctuates but reports waking up more refreshed  Appetite:  Eating better  Medication side effects: she denies any side ffects of current medication regimen  ROS:  No complaints    Subjective: Pt continues to report feeling anxious, still dealing with a lot of negative ruminative thoughts and guilt about being a burden to her family because she sometimes has to depend on her son and  to get around and to her appointments.  She also mentions she has lost a lot of friends since December 2022 some of them 2/2 cancer and she worries about getting cancer. " I am still struggling to know what is real" but she no longer hears noises , " no crickets"     Mental Status Evaluation:  Appearance:  age appropriate and casually dressed   Behavior:  guarded and psychomotor retardation   Speech:  soft   Mood:  anxious and depressed   Affect:  constricted   Thought Process:  circumstantial   Associations: circumstantial associations   Thought Content:  negative ruminations, anxious thought, guilt   Perceptual Disturbances: None   Risk Potential: Suicidal Ideations   : no  Homicidal Ideations: no  Potential for Aggression : no   Sensorium:  person, place and time/date   Memory:  recent and remote memory grossly intact   Consciousness:  alert and awake    Attention: {fair   Insight:  fair Judgment: fair   Gait/Station: normal gait/station   Motor Activity: no abnormal movements     Progress Toward Goals: progressing well    Recommended Treatment: Continue with group therapy, milieu therapy and occupational therapy. No medication adjustment needed today. Risks, benefits and possible side effects of Medications:   Risks, benefits, and possible side effects of medications explained to patient and patient verbalizes understanding.       Medications:   current meds:   Current Facility-Administered Medications   Medication Dose Route Frequency   • acetaminophen (TYLENOL) tablet 650 mg  650 mg Oral Q4H PRN   • acetaminophen (TYLENOL) tablet 650 mg  650 mg Oral Q4H PRN   • acetaminophen (TYLENOL) tablet 975 mg  975 mg Oral Q6H PRN   • albuterol (PROVENTIL HFA,VENTOLIN HFA) inhaler 2 puff  2 puff Inhalation Q4H PRN   • ARIPiprazole (ABILIFY) tablet 10 mg  10 mg Oral Daily   • butalbital-acetaminophen-caffeine (FIORICET,ESGIC) -40 mg per tablet 1 tablet  1 tablet Oral Q8H PRN   • cetirizine (ZyrTEC) tablet 20 mg  20 mg Oral Daily   • cholecalciferol (VITAMIN D3) tablet 1,000 Units  1,000 Units Oral Daily   • diphenhydrAMINE (BENADRYL) injection 50 mg  50 mg Intravenous Q6H PRN   • EPINEPHrine PF (ADRENALIN) 1 mg/mL injection 0.3 mg  0.3 mg Intramuscular Daily PRN   • famotidine (PEPCID) tablet 40 mg  40 mg Oral Daily   • haloperidol lactate (HALDOL) injection 5 mg  5 mg Intramuscular Q4H PRN Max 4/day   • hydrOXYzine HCL (ATARAX) tablet 25 mg  25 mg Oral Q6H PRN Max 4/day   • hydrOXYzine HCL (ATARAX) tablet 50 mg  50 mg Oral Q6H PRN Max 4/day   • levothyroxine tablet 100 mcg  100 mcg Oral Early Morning   • liothyronine (CYTOMEL) tablet 5 mcg  5 mcg Oral Daily   • LORazepam (ATIVAN) injection 1 mg  1 mg Intramuscular Q6H PRN Max 3/day   • LORazepam (ATIVAN) tablet 0.5 mg  0.5 mg Oral BID PRN   • LORazepam (ATIVAN) tablet 1 mg  1 mg Oral Q6H PRN Max 3/day   • mirtazapine (REMERON) tablet 15 mg  15 mg Oral HS   • montelukast (SINGULAIR) tablet 10 mg  10 mg Oral HS   • pantoprazole (PROTONIX) EC tablet 40 mg  40 mg Oral Early Morning   • predniSONE tablet 20 mg  20 mg Oral Daily   • risperiDONE (RisperDAL) tablet 0.25 mg  0.25 mg Oral Q4H PRN Max 6/day   • risperiDONE (RisperDAL) tablet 0.5 mg  0.5 mg Oral Q4H PRN Max 3/day   • risperiDONE (RisperDAL) tablet 1 mg  1 mg Oral Q2H PRN Max 3/day   • sertraline (ZOLOFT) tablet 75 mg  75 mg Oral Daily   • traMADol (ULTRAM) tablet 50 mg  50 mg Oral Q6H PRN   . Labs: I have personally reviewed all pertinent laboratory/tests results. Most Recent Labs:   Lab Results   Component Value Date    WBC 14.83 (H) 07/25/2023    RBC 5.16 (H) 07/25/2023    HGB 15.1 07/25/2023    HCT 47.0 (H) 07/25/2023     (H) 07/25/2023    RDW 15.3 (H) 07/25/2023    NEUTROABS 10.80 (H) 07/25/2023    SODIUM 138 07/28/2023    K 4.9 07/28/2023     07/28/2023    CO2 23 07/28/2023    BUN 13 07/28/2023    CREATININE 0.64 07/28/2023    GLUC 87 07/28/2023    GLUF 87 07/28/2023    CALCIUM 8.7 07/28/2023    AST 22 07/25/2023    ALT 39 07/25/2023    ALKPHOS 55 07/25/2023    TP 7.4 07/25/2023    ALB 4.4 07/25/2023    TBILI 0.55 07/25/2023    CHOLESTEROL 196 03/12/2022    HDL 77 03/12/2022    TRIG 69 03/12/2022    LDLCALC 105 (H) 03/12/2022    ANU6DOFSWRRH 0.342 (L) 07/21/2023    FREET4 0.81 07/21/2023    T3FREE 1.85 (L) 04/19/2023    PREGSERUM Negative 12/02/2021    HCGQUANT <2 08/26/2017    HGBA1C 5.4 09/01/2022     09/01/2022       Counseling / Coordination of Care  Total floor / unit time spent today 25 minutes. Greater than 50% of total time was spent with the patient and / or family counseling and / or coordination of care. A description of the counseling / coordination of care: supportive therapy, psychoeducation.

## 2023-08-01 NOTE — PROGRESS NOTES
08/01/23 1000   Activity/Group Checklist   Group Community meeting  (goal setting)   Attendance Attended   Attendance Duration (min) 31-45   Interactions Interacted appropriately  (Pt. set goal to attend all 3 groups today. Reported that her anxiety usually intensifies mid morning and hopes to keep self distracted by group participation today. Expressed surprise that peers said she was missed in yesterdays afternoon group.)   Affect/Mood Appropriate;Normal range   Goals Achieved Able to listen to others; Able to engage in interactions; Discussed coping strategies; Identified feelings; Able to self-disclose; Able to reflect/comment on own behavior

## 2023-08-01 NOTE — PROGRESS NOTES
08/01/23 0744   Team Meeting   Meeting Type Daily Rounds   Initial Conference Date 08/01/23   Team Members Present   Team Members Present Physician;Nurse;;; Occupational Therapist   Physician Team Member [id number] Team Member [facility] Management Team Member [facility] [address] Work Team Member Tamela   OT Team Member Javier Vance   Patient/Family Present   Patient Present No   Patient's Family Present No     Patient was very anxious yesterday and received Ativan 1 mg. She seems to be more insightful and less confused. She is able to identify some of her stressors that she would like to work on. Discharge is pending.

## 2023-08-01 NOTE — NURSING NOTE
Pt visualized on unit. Pt reported 4/10 anxiety and depression this morning. After breakfast, pt was visualized sitting quietly in the dayroom. In the afternoon, pt requested to speak to this writer in her room privately. The pt expressed that she was experiencing "a lot of anxiety." This writer encouraged her to work through her thoughts and express her feelings. The pt stated that she felt very alone and that "when I talk it feels like nobody is listening." The pt went on to explain that she has been trying to cut back on the amount of medications she has been taking for "years." The pt states that she feels frustrated by the amount of medications she is receiving. The patient is requesting to see a therapist on discharge. The pt also feels that she should work on setting boundaries and advocating for her self. During the interaction, the patient was tearful. However, by the end of the conversation, the pt stated that she "felt better" and appreciated having someone to talk to. Will continue to monitor anxiety levels and overall mood.

## 2023-08-01 NOTE — PLAN OF CARE
Pt. Set goal to engage in 3 of 3 groups yet only attending 2 of 3 today. Pt. demonstrating improve social interaction with peers in the dayroom.   Problem: Ineffective Coping  Goal: Participates in unit activities  Description: Interventions:  - Provide therapeutic environment   - Provide required programming   - Redirect inappropriate behaviors   Outcome: Progressing

## 2023-08-01 NOTE — NURSING NOTE
Pt visible in room, pleasant on approach. Reports some anxiety and depression, exhibits appropriate coping mechanisms. Showered this PM. Will maintain q7min checks.

## 2023-08-02 ENCOUNTER — TELEPHONE (OUTPATIENT)
Dept: FAMILY MEDICINE CLINIC | Facility: CLINIC | Age: 48
End: 2023-08-02

## 2023-08-02 LAB
BILIRUB UR QL STRIP: NEGATIVE
CLARITY UR: CLEAR
COLOR UR: NORMAL
GLUCOSE UR STRIP-MCNC: NEGATIVE MG/DL
HGB UR QL STRIP.AUTO: NEGATIVE
KETONES UR STRIP-MCNC: NEGATIVE MG/DL
LEUKOCYTE ESTERASE UR QL STRIP: NEGATIVE
NITRITE UR QL STRIP: NEGATIVE
PH UR STRIP.AUTO: 7 [PH]
PROT UR STRIP-MCNC: NEGATIVE MG/DL
SP GR UR STRIP.AUTO: 1.01 (ref 1–1.04)
UROBILINOGEN UA: NEGATIVE MG/DL

## 2023-08-02 PROCEDURE — 99232 SBSQ HOSP IP/OBS MODERATE 35: CPT | Performed by: STUDENT IN AN ORGANIZED HEALTH CARE EDUCATION/TRAINING PROGRAM

## 2023-08-02 PROCEDURE — 81003 URINALYSIS AUTO W/O SCOPE: CPT

## 2023-08-02 RX ORDER — SERTRALINE HYDROCHLORIDE 100 MG/1
100 TABLET, FILM COATED ORAL DAILY
Status: DISCONTINUED | OUTPATIENT
Start: 2023-08-03 | End: 2023-08-05

## 2023-08-02 RX ADMIN — FAMOTIDINE 40 MG: 20 TABLET ORAL at 08:27

## 2023-08-02 RX ADMIN — MIRTAZAPINE 15 MG: 15 TABLET, FILM COATED ORAL at 21:09

## 2023-08-02 RX ADMIN — CETIRIZINE HYDROCHLORIDE 20 MG: 10 TABLET, FILM COATED ORAL at 08:31

## 2023-08-02 RX ADMIN — PANTOPRAZOLE SODIUM 40 MG: 40 TABLET, DELAYED RELEASE ORAL at 05:12

## 2023-08-02 RX ADMIN — MONTELUKAST 10 MG: 10 TABLET, FILM COATED ORAL at 21:09

## 2023-08-02 RX ADMIN — ARIPIPRAZOLE 10 MG: 10 TABLET ORAL at 08:27

## 2023-08-02 RX ADMIN — ACETAMINOPHEN 650 MG: 325 TABLET ORAL at 05:12

## 2023-08-02 RX ADMIN — PREDNISONE 20 MG: 20 TABLET ORAL at 08:27

## 2023-08-02 RX ADMIN — LIOTHYRONINE SODIUM 5 MCG: 5 TABLET ORAL at 08:32

## 2023-08-02 RX ADMIN — HYDROXYZINE HYDROCHLORIDE 50 MG: 25 TABLET ORAL at 21:09

## 2023-08-02 RX ADMIN — SERTRALINE HYDROCHLORIDE 75 MG: 50 TABLET ORAL at 08:27

## 2023-08-02 RX ADMIN — LEVOTHYROXINE SODIUM 100 MCG: 100 TABLET ORAL at 05:12

## 2023-08-02 RX ADMIN — LORAZEPAM 1 MG: 1 TABLET ORAL at 05:12

## 2023-08-02 RX ADMIN — CHOLECALCIFEROL TAB 25 MCG (1000 UNIT) 1000 UNITS: 25 TAB at 08:27

## 2023-08-02 NOTE — TELEPHONE ENCOUNTER
Austin from [facility] called regarding Celine's upcoming appt with Dr. Sofya Tucker-  Just had to R/S to for a later  Appt due to Pt currently being in the hospital.

## 2023-08-02 NOTE — PLAN OF CARE
Problem: Alteration in Thoughts and Perception  Goal: Treatment Goal: Gain control of psychotic behaviors/thinking, reduce/eliminate presenting symptoms and demonstrate improved reality functioning upon discharge  Outcome: Progressing  Goal: Verbalize thoughts and feelings  Description: Interventions:  - Promote a nonjudgmental and trusting relationship with the patient through active listening and therapeutic communication  - Assess patient's level of functioning, behavior and potential for risk  - Engage patient in 1 on 1 interactions  - Encourage patient to express fears, feelings, frustrations, and discuss symptoms    - [place] patient to reality, help patient recognize reality-based thinking   - Administer medications as ordered and assess for potential side effects  - Provide the patient education related to the signs and symptoms of the illness and desired effects of prescribed medications  Outcome: Progressing  Goal: Refrain from acting on delusional thinking/internal stimuli  Description: Interventions:  - Monitor patient closely, per order   - Utilize least restrictive measures   - Set reasonable limits, give positive feedback for acceptable   - Administer medications as ordered and monitor of potential side effects  Outcome: Progressing  Goal: Agree to be compliant with medication regime, as prescribed and report medication side effects  Description: Interventions:  - Offer appropriate PRN medication and supervise ingestion; conduct AIMS, as needed   Outcome: Progressing  Goal: Recognize dysfunctional thoughts, communicate reality-based thoughts at the time of discharge  Description: Interventions:  - Provide medication and psycho-education to assist patient in compliance and developing insight into his/her illness   Outcome: Progressing  Goal: Complete daily ADLs, including personal hygiene independently, as able  Description: Interventions:  - Observe, teach, and assist patient with ADLS  - Monitor and promote a balance of rest/activity, with adequate nutrition and elimination   Outcome: Progressing     Problem: Ineffective Coping  Goal: Cooperates with admission process  Description: Interventions:   - Complete admission process  Outcome: Progressing  Goal: Identifies ineffective coping skills  Outcome: Progressing  Goal: Identifies healthy coping skills  Outcome: Progressing  Goal: Demonstrates healthy coping skills  Outcome: Progressing  Goal: Patient/Family participate in treatment and DC plans  Description: Interventions:  - Provide therapeutic environment  Outcome: Progressing  Goal: Patient/Family verbalizes awareness of resources  Outcome: Progressing  Goal: Understands least restrictive measures  Description: Interventions:  - Utilize least restrictive behavior  Outcome: Progressing  Goal: Free from restraint events  Description: - Utilize least restrictive measures   - Provide behavioral interventions   - Redirect inappropriate behaviors   Outcome: Progressing     Problem: Risk for Self Injury/Neglect  Goal: Treatment Goal: Remain safe during length of stay, learn and adopt new coping skills, and be free of self-injurious ideation, impulses and acts at the time of discharge  Outcome: Progressing  Goal: Verbalize thoughts and feelings  Description: Interventions:  - Assess and re-assess patient's lethality and potential for self-injury  - Engage patient in 1:1 interactions, daily, for a minimum of 15 minutes  - Encourage patient to express feelings, fears, frustrations, hopes  - Establish rapport/trust with patient   Outcome: Progressing  Goal: Refrain from harming self  Description: Interventions:  - Monitor patient closely, per order  - Develop a trusting relationship  - Supervise medication ingestion, monitor effects and side effects   Outcome: Progressing  Goal: Attend and participate in unit activities, including therapeutic, recreational, and educational groups  Description: Interventions:  - Provide therapeutic and educational activities daily, encourage attendance and participation, and document same in the medical record  - Obtain collateral information, encourage visitation and family involvement in care   Outcome: Progressing  Goal: Recognize maladaptive responses and adopt new coping mechanisms  Outcome: Progressing  Goal: Complete daily ADLs, including personal hygiene independently, as able  Description: Interventions:  - Observe, teach, and assist patient with ADLS  - Monitor and promote a balance of rest/activity, with adequate nutrition and elimination  Outcome: Progressing     Problem: Depression  Goal: Treatment Goal: Demonstrate behavioral control of depressive symptoms, verbalize feelings of improved mood/affect, and adopt new coping skills prior to discharge  Outcome: Progressing  Goal: Verbalize thoughts and feelings  Description: Interventions:  - Assess and re-assess patient's level of risk   - Engage patient in 1:1 interactions, daily, for a minimum of 15 minutes   - Encourage patient to express feelings, fears, frustrations, hopes   Outcome: Progressing  Goal: Refrain from harming self  Description: Interventions:  - Monitor patient closely, per order   - Supervise medication ingestion, monitor effects and side effects   Outcome: Progressing  Goal: Refrain from isolation  Description: Interventions:  - Develop a trusting relationship   - Encourage socialization   Outcome: Progressing  Goal: Refrain from self-neglect  Outcome: Progressing  Goal: Attend and participate in unit activities, including therapeutic, recreational, and educational groups  Description: Interventions:  - Provide therapeutic and educational activities daily, encourage attendance and participation, and document same in the medical record   Outcome: Progressing  Goal: Complete daily ADLs, including personal hygiene independently, as able  Description: Interventions:  - Observe, teach, and assist patient with ADLS  -  Monitor and promote a balance of rest/activity, with adequate nutrition and elimination   Outcome: Progressing     Problem: Anxiety  Goal: Anxiety is at manageable level  Description: Interventions:  - Assess and monitor patient's anxiety level. - Monitor for signs and symptoms (heart palpitations, chest pain, shortness of breath, headaches, nausea, feeling jumpy, restlessness, irritable, apprehensive). - Collaborate with interdisciplinary team and initiate plan and interventions as ordered.   - [place] patient to unit/surroundings  - Explain treatment plan  - Encourage participation in care  - Encourage verbalization of concerns/fears  - Identify coping mechanisms  - Assist in developing anxiety-reducing skills  - Administer/offer alternative therapies  - Limit or eliminate stimulants  Outcome: Progressing     Problem: Risk for Violence/Aggression Toward Others  Goal: Verbalize thoughts and feelings  Description: Interventions:  - Assess and re-assess patient's level of risk, every waking shift  - Engage patient in 1:1 interactions, daily, for a minimum of 15 minutes   - Allow patient to express feelings and frustrations in a safe and non-threatening manner   - Establish rapport/trust with patient   Outcome: Progressing  Goal: Refrain from harming others  Outcome: Progressing  Goal: Refrain from destructive acts on the environment or property  Outcome: Progressing  Goal: Control angry outbursts  Description: Interventions:  - Monitor patient closely, per order  - Ensure early verbal de-escalation  - Monitor prn medication needs  - Set reasonable/therapeutic limits, outline behavioral expectations, and consequences   - Provide a non-threatening milieu, utilizing the least restrictive interventions   Outcome: Progressing  Goal: Attend and participate in unit activities, including therapeutic, recreational, and educational groups  Description: Interventions:  - Provide therapeutic and educational activities daily, encourage attendance and participation, and document same in the medical record   Outcome: Progressing  Goal: Identify appropriate positive anger management techniques  Description: Interventions:  - Offer anger management and coping skills groups   - Staff will provide positive feedback for appropriate anger control  Outcome: Progressing     Problem: Alteration in Orientation  Goal: Treatment Goal: Demonstrate a reduction of confusion and improved orientation to person, place, time and/or situation upon discharge, according to optimum baseline/ability  Outcome: Progressing  Goal: Interact with staff daily  Description: Interventions:  - Assess and re-assess patient's level of orientation  - Engage patient in 1 on 1 interactions, daily, for a minimum of 15 minutes   - Establish rapport/trust with patient   Outcome: Progressing  Goal: Express concerns related to confused thinking related to:  Description: Interventions:  - Encourage patient to express feelings, fears, frustrations, hopes  - Assign consistent caregivers   - [place]/re-orient patient as needed  - Allow comfort items, as appropriate  - Provide visual cues, signs, etc.   Outcome: Progressing  Goal: Allow medical examinations, as recommended  Description: Interventions:  - Provide physical/neurological exams and/or referrals, per provider   Outcome: Progressing  Goal: Cooperate with recommended testing/procedures  Description: Interventions:  - Determine need for ancillary testing  - Observe for mental status changes  - Implement falls/precaution protocol   Outcome: Progressing  Goal: Attend and participate in unit activities, including therapeutic, recreational, and educational groups  Description: Interventions:  - Provide therapeutic and educational activities daily, encourage attendance and participation, and document same in the medical record   - Provide appropriate opportunities for reminiscence   - Provide a consistent daily routine   - Encourage family contact/visitation   Outcome: Progressing  Goal: Complete daily ADLs, including personal hygiene independently, as able  Description: Interventions:  - Observe, teach, and assist patient with ADLS  Outcome: Progressing     Problem: Individualized Interventions  Goal: Patient will verbalize appropriate use of telephone within 5 days  Description: Interventions:  - Treatment team to determine use of supervised phone privileges   Outcome: Progressing  Goal: Patient will verbalize need for hospitalization and will no longer attempt elopement within 5 days  Description: Interventions:  - Ongoing education to help patient understand need for hospitalization  Outcome: Progressing  Goal: Patient will recognize inappropriate behaviors and develop alternative behaviors within 5 days  Description: Interventions:  - Patient in collaboration with Treatment Team will develop a behavior management plan to help identify effective coping skills to deal with stressors  Outcome: Progressing

## 2023-08-02 NOTE — TREATMENT TEAM
08/02/23 1100   Activity/Group Checklist   Group Other (Comment)  (positive refletcion and reframing)   Attendance Attended   Attendance Duration (min) 16-30   Interactions Interacted appropriately   Affect/Mood Constricted   Goals Achieved Identified feelings; Identified relapse prevention strategies; Able to listen to others; Able to engage in interactions

## 2023-08-02 NOTE — PROGRESS NOTES
Progress Note - Behavioral Health   Lola Zamorano 46 y.o. female MRN: [id number]  Unit/Bed#: Ruth Pandey 389-75 Encounter: [id number]    The patient was seen for continuing care and reviewed with treatment team.  Patient received  PRN ativan for anxiety. Reports she is improving slowly, mood continues to fluctuate but mostly improved. She reports her goal is to reduce the amount of PRN medications she is receiving. She is hopeful for discharge soon. Sleep- sleeping better  Appetite - improved  Energy: fair  No suicidal or homicidal ideations. ROS:  None reported.      /86 (BP Location: Right arm)   Pulse (!) 111   Temp (!) 97.4 °F (36.3 °C) (Temporal)   Resp 18   Ht 5' 5" (1.651 m)   Wt 65.4 kg (144 lb 2.9 oz)   SpO2 96%   BMI 23.99 kg/m²     Current Mental Status Evaluation:  Appearance:  Adequate hygiene and grooming   Behavior:  Calm, Cooperative, Pleasant, Superficial and Dismissive   Mood:  anxious and improving   Affect: constricted, anxious   Speech: Normal volume and Normal rate   Thought Process:  Goal directed and coherent , Circumstantial at times   Thought Content:  Does not verbalize delusional material   Perceptual Disturbances: Denies hallucinations and does not appear to be responding to internal stimuli,    Risk Potential: No suicidal or homicidal ideation   Orientation:   Patient is alert, awake and oriented x 3   Patient has limited insight into her illness, judgment and impulse control are limited    Recent Results (from the past 72 hour(s))   UA w Reflex to Microscopic w Reflex to Culture    Collection Time: 08/02/23  6:29 AM    Specimen: Urine, Clean Catch   Result Value Ref Range    Color, UA Straw Straw, Yellow, Pale Yellow    Clarity, UA Clear Clear, Other    Specific Gravity, UA 1.010 1.003 - 1.040    pH, UA 7.0 4.5, 5.0, 5.5, 6.0, 6.5, 7.0, 7.5, 8.0    Leukocytes, UA Negative Negative    Nitrite, UA Negative Negative    Protein, UA Negative Negative mg/dl    Glucose, UA Negative Negative mg/dl    Ketones, UA Negative Negative mg/dl    Bilirubin, UA Negative Negative    Occult Blood, UA Negative Negative    UROBILINOGEN UA Negative 1.0, Negative mg/dL         Progress Toward Goals: Progressing. Assessment     Principal Problem:    Major depressive disorder with psychotic features ([address])  Active Problems:    Hypokalemia    Headache    Generalized anxiety disorder    Acquired hypothyroidism    Leukocytosis    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HCC)    Crohn's colitis (HCC)    Seizure (HCC)    Bladder tumor    Encephalopathy chronic    Medical clearance for psychiatric admission    Seasonal allergies    GERD without esophagitis    Benzodiazepine misuse        Plan :    - Medications;   Psychiatric:   Increase Sertraline 100mg daily   Abilify 10mg daily for psychosis.      Medical: per SLIM    -Therapy: occupational therapy, milieu and group therapy  - Legal: 768   -Disposition:Home when stable

## 2023-08-02 NOTE — SOCIAL WORK
Cm reached out to [facility] to schedule a follow up appointment with psychiatrist (Psychiatrist Juliane Crowley - [phone]).  Appointment was made via telehealth for 8/24 at 1:20pm.

## 2023-08-02 NOTE — PLAN OF CARE
Pt. Ramana Fritz attending groups as scheduled.   Problem: Ineffective Coping  Goal: Participates in unit activities  Description: Interventions:  - Provide therapeutic environment   - Provide required programming   - Redirect inappropriate behaviors   Outcome: Progressing

## 2023-08-02 NOTE — PROGRESS NOTES
08/02/23 0752   Team Meeting   Meeting Type Daily Rounds   Initial Conference Date 08/02/23   Team Members Present   Team Members Present Physician;Nurse;;; Occupational Therapist   Physician Team Member Cesario Britton Team Member [address] Management Team Member [address] Work Team Member Mechele Phalen   OT Team Member Steven Saldaña   Patient/Family Present   Patient Present No   Patient's Family Present No     Patient had increased anxiety due to a new roommate. She started to report a headache and a fever and was given tylenol. She had poor sleep and they gave her more tylenol. Her temperature was 98 degrees. She received Ativan 1mg at 5:12 due to increased anxiety and it was effective. She has been attending groups. Discharge is pending.

## 2023-08-02 NOTE — TREATMENT TEAM
08/02/23 0510   Service   Service SLIM   Provider Name Practor     Reached out to provider regarding pt report of burning with urination. UA ordered.

## 2023-08-02 NOTE — NURSING NOTE
Pt rang call bell, reported feeling feverish. T: 98.5. Pt reports headache. Provided with prn tylenol at [phone]. Pt reports tremors and anxiety. Provided with prn ativan 1 mg at 0512. HAM 29.

## 2023-08-02 NOTE — SOCIAL WORK
CM contacted [address] [place] [address] to reschedule appointment that was set for 8/7/23 and rescheduled for 8/17 at 8:20am.

## 2023-08-02 NOTE — NURSING NOTE
Pt visualized playing a game with peers in the dayroom. Pt stated that she had a "rough" night last night. Pt stated she had trouble sleeping and was in pain. This morning, the pt stated that she had 3/10 anxiety and 2/10 depression. Pt denied SI/HI. Pt has been pleasant and visible on the unit all day. Pt was seen walking the halls with peers and was bright and talkative. Pt states that she feels "much better" when she is surrounded by peers. Will continue to monitor for changes in mood and behavior.

## 2023-08-03 ENCOUNTER — TELEPHONE (OUTPATIENT)
Dept: PSYCHIATRY | Facility: CLINIC | Age: 48
End: 2023-08-03

## 2023-08-03 PROCEDURE — 99232 SBSQ HOSP IP/OBS MODERATE 35: CPT | Performed by: STUDENT IN AN ORGANIZED HEALTH CARE EDUCATION/TRAINING PROGRAM

## 2023-08-03 RX ORDER — DIPHENHYDRAMINE HYDROCHLORIDE 50 MG/ML
50 INJECTION INTRAMUSCULAR; INTRAVENOUS EVERY 6 HOURS PRN
Status: DISCONTINUED | OUTPATIENT
Start: 2023-08-03 | End: 2023-08-05

## 2023-08-03 RX ADMIN — PREDNISONE 20 MG: 20 TABLET ORAL at 08:44

## 2023-08-03 RX ADMIN — DIPHENHYDRAMINE HYDROCHLORIDE 50 MG: 50 INJECTION, SOLUTION INTRAMUSCULAR; INTRAVENOUS at 15:43

## 2023-08-03 RX ADMIN — MIRTAZAPINE 15 MG: 15 TABLET, FILM COATED ORAL at 21:17

## 2023-08-03 RX ADMIN — MONTELUKAST 10 MG: 10 TABLET, FILM COATED ORAL at 21:17

## 2023-08-03 RX ADMIN — CHOLECALCIFEROL TAB 25 MCG (1000 UNIT) 1000 UNITS: 25 TAB at 08:44

## 2023-08-03 RX ADMIN — HYDROXYZINE HYDROCHLORIDE 50 MG: 25 TABLET ORAL at 21:17

## 2023-08-03 RX ADMIN — LIOTHYRONINE SODIUM 5 MCG: 5 TABLET ORAL at 08:45

## 2023-08-03 RX ADMIN — LEVOTHYROXINE SODIUM 100 MCG: 100 TABLET ORAL at 06:30

## 2023-08-03 RX ADMIN — SERTRALINE HYDROCHLORIDE 100 MG: 100 TABLET ORAL at 08:44

## 2023-08-03 RX ADMIN — HYDROXYZINE HYDROCHLORIDE 50 MG: 25 TABLET ORAL at 04:26

## 2023-08-03 RX ADMIN — CETIRIZINE HYDROCHLORIDE 20 MG: 10 TABLET, FILM COATED ORAL at 08:47

## 2023-08-03 RX ADMIN — HYDROXYZINE HYDROCHLORIDE 50 MG: 25 TABLET ORAL at 13:13

## 2023-08-03 RX ADMIN — PANTOPRAZOLE SODIUM 40 MG: 40 TABLET, DELAYED RELEASE ORAL at 06:30

## 2023-08-03 RX ADMIN — ARIPIPRAZOLE 10 MG: 10 TABLET ORAL at 08:43

## 2023-08-03 RX ADMIN — FAMOTIDINE 40 MG: 20 TABLET ORAL at 08:44

## 2023-08-03 NOTE — NURSING NOTE
Patient visible on the unit and social with peers. Endorses anxiety and depression. Requested to eat dinner in the hallway d/t being "triggered" by smells in the dayroom. Encouraged to inform staff of any needs.

## 2023-08-03 NOTE — NURSING NOTE
Pt alert and visible on the unit. Denies depression but continues to report anxiety. Limited peer interactions. Denies si. Appetite 100% for breakfast and 75% for lunch. Gait steady. Will continue to monitor and maintain q 7 min checks.

## 2023-08-03 NOTE — PLAN OF CARE
Problem: Ineffective Coping  Goal: Identifies healthy coping skills  Outcome: Progressing  Goal: Demonstrates healthy coping skills  Outcome: Progressing  Goal: Participates in unit activities  Description: Interventions:  - Provide therapeutic environment   - Provide required programming   - Redirect inappropriate behaviors   Outcome: Progressing

## 2023-08-03 NOTE — PROGRESS NOTES
08/03/23 0754   Team Meeting   Meeting Type Daily Rounds   Initial Conference Date 08/03/23   Team Members Present   Team Members Present Physician;Nurse;;; Occupational Therapist   Physician Team Member [address] [facility] Team Member [facility] Management Team Member [facility] [address] Work Team Member Tamela   OT Team Member Emmie Olivera   Patient/Family Present   Patient Present No   Patient's Family Present No     Reporting some itching and was given Attarax. She had poor sleep. Received Ativan once yesterday morning and it was effective. She endorsed anxiety but not depression. She was calm and focused during group. Zoloft has been increased. Discharge is pending.

## 2023-08-03 NOTE — NURSING NOTE
Patient reports "I'm feeling much better." Ambulating the hallway and socializing with peers. PRN Benadryl effective.

## 2023-08-03 NOTE — TELEPHONE ENCOUNTER
Received an in-basket message from patient's . Patient has been scheduled for virtual talk therapy services upon discharge.
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
## 2023-08-03 NOTE — PHYSICAL THERAPY NOTE
Physical Therapy Evaluation    Patient's Name: Jailyn Jara    Admitting Diagnosis  Major depressive disorder, single episode, unspecified [F32.9]  Suicidal ideation [R46.333]    Problem List  Patient Active Problem List   Diagnosis    Chondromalacia    Vomiting and diarrhea    SIRS (systemic inflammatory response syndrome) (HCC)    Vitamin D deficiency    Hypokalemia    Throat tightness    Abdominal pain    Headache    Hypomagnesemia    Generalized anxiety disorder    Acquired hypothyroidism    Leukocytosis    CHF (congestive heart failure) (HCC)    Fibromyalgia    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HCC)    Meniere's disease    Chronic idiopathic urticaria    Disorder of synovium    Primary localized osteoarthrosis of ankle and foot    Thrush    Chronic back pain    Thrombocytosis    Proctitis    Immunosuppressed status (HCC)    Mass of left axilla    Constipation    Mild protein-calorie malnutrition (HCC)    Anorexia    Crohn's colitis ([address])    Overweight (BMI 25.0-29. 9)    Current chronic use of systemic steroids    Migraine    MS (multiple sclerosis) (HCC)    Inflammatory bowel disease    Heart palpitations    Intractable nausea and vomiting    Anaphylactic reaction    Nephrolithiasis    Atrial tachycardia (HCC)    Immunosuppression due to chronic steroid use (HCC)    Seizure (HCC)    Bladder tumor    Systemic lupus erythematosus, unspecified SLE type, unspecified organ involvement status ([address])    Encephalopathy chronic    Passive suicidal ideations    Medical clearance for psychiatric admission    Seasonal allergies    GERD without esophagitis    Major depressive disorder with psychotic features ([address])    Benzodiazepine misuse       Past Medical History  Past Medical History:   Diagnosis Date    Anemia 2007    Anxiety     Asthma     Bile duct stricture 2014    Sphincter of oddi dysfunction    Chronic kidney disease     Colon polyp 1998    Crohn's colitis ([address])     Deep vein thrombosis ([address])     Disease of thyroid gland     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    Generalized anxiety disorder 08/26/2017    GERD (gastroesophageal reflux disease) 1995    GI (gastrointestinal bleed) 1994    Heart murmur     Inflammatory bowel disease     Irritable bowel syndrome 2016    Lactose intolerance 1982    Mast cell disease     Migraine     Myocardial infarction ([address])     NSTEMI. pt denies, documented in cardio note    Pancreatitis     sphinger of     Psychiatric disorder     RA (rheumatoid arthritis) ([address])     Seizures ([address])     Ulcerative colitis ([address])     Visual impairment        Past Surgical History  Past Surgical History:   Procedure Laterality Date    ABDOMINAL SURGERY  2017    APPENDECTOMY      CHOLECYSTECTOMY      COLONOSCOPY  2019    CYSTOSCOPY N/A 6/8/2023    Procedure: CYSTOSCOPY, mini TURBT with fulgeration;  Surgeon: Micah Gray MD;  Location: [facility];  Service: Urology    EGD AND COLONOSCOPY N/A 09/12/2017    Procedure: EGD AND COLONOSCOPY;  Surgeon: Neal Monroy MD;  Location:  GI LAB; Service: Gastroenterology    ERCP N/A 09/15/2017    Procedure: ENDOSCOPIC RETROGRADE CHOLANGIOPANCREATOGRAPHY (ERCP); Surgeon: Zeny Osman MD;  Location:  GI LAB;   Service: Gastroenterology    HYSTERECTOMY      KNEE SURGERY Right     LAPAROSCOPIC ENDOMETRIOSIS FULGURATION      ORIF ANKLE FRACTURE Left     MT ARTHRS KNE SURG W/MENISCECTOMY MED/LAT W/SHVG Left 05/12/2017    Procedure: POSSIBLE MEDIAL MENISECTOMY;  Surgeon: Roxanne Taylor MD;  Location: [facility];  Service: Orthopedics    MT ARTHRS KNEE DEBRIDEMENT/SHAVING ARTCLR CRTLG Left 05/12/2017    Procedure: KNEE ARTHROSCOPY EVALUATION, CHONDROPLASTY;  Surgeon: Roxanne Taylor MD;  Location: [facility];  Service: Orthopedics    MT LAPS ABD PRTM&OMENTUM DX W/WO [facility] BR/WA [facility] N/A 12/12/2017    Procedure: LAPAROSCOPY DIAGNOSTIC  WITH LYSIS OF ADHESIONS;  Surgeon: Neela Trevizo DO;  Location:  MAIN OR;  Service: General    UPPER GASTROINTESTINAL ENDOSCOPY  2019 Recent Imaging  No orders to display       Recent Vital Signs  Vitals:    08/01/23 2051 08/02/23 0815 08/02/23 1533 08/02/23 2044   BP: 130/89 120/83 125/86 118/85   BP Location: Right arm Right arm Right arm Left arm   Pulse: (!) 112 (!) 112 (!) 111 (!) 113   Resp: 18 18 18 18   Temp: 98.3 °F (36.8 °C) (!) 97.4 °F (36.3 °C) (!) 97.4 °F (36.3 °C) 97.5 °F (36.4 °C)   TempSrc: Temporal Temporal Temporal Temporal   SpO2: 98% 97% 96% 96%   Weight:       Height:            08/01/23 1130   PT Last Visit   PT Visit Date 08/01/23   Note Type   Note type Evaluation   Pain Assessment   Pain Assessment Tool 0-10   Pain Score No Pain   Restrictions/Precautions   Weight Bearing Precautions Per Order No   Other Precautions Pain   General   Family/Caregiver Present No   Cognition   Arousal/Participation Alert   Orientation Level Oriented to person;Oriented to place;Oriented to situation   Memory Within functional limits   Following Commands Follows all commands and directions without difficulty   RLE Assessment   RLE Assessment WFL   LLE Assessment   LLE Assessment WFL   Coordination   Movements are Fluid and Coordinated 1   Sensation X   Light Touch   RLE Light Touch Grossly intact   LLE Light Touch Impaired   Bed Mobility   Supine to Sit 6  Modified independent   Additional items Increased time required   Sit to Supine 6  Modified independent   Additional items Increased time required   Transfers   Sit to Stand 6  Modified independent   Additional items Increased time required   Stand to Sit 6  Modified independent   Additional items Increased time required   Ambulation/Elevation   Gait pattern Step through pattern;Decreased toe off;Decreased heel strike;Decreased foot clearance   Gait Assistance 6  Modified independent   Assistive Device None   Distance 250ft   Balance   Static Sitting Fair +   Dynamic Sitting Fair +   Static Standing Fair   Dynamic Standing Fair   Ambulatory Fair   Endurance Deficit   Endurance Deficit Yes   Endurance Deficit Description LLE pain   Activity Tolerance   Activity Tolerance Patient tolerated treatment well   Medical Staff Made Aware spoke to CM   Nurse Made Aware spoke to RN   Assessment   Prognosis Good   Problem List Decreased strength;Decreased endurance; Impaired balance;Decreased mobility;Pain   Barriers to Discharge Inaccessible home environment   Goals   Patient Goals to return home, have less leg pain   Recommendation   PT Discharge Recommendation Home with outpatient rehabilitation   AM-PAC Basic Mobility Inpatient   Turning in Flat Bed Without Bedrails 4   Lying on Back to Sitting on Edge of Flat Bed Without Bedrails 4   Moving Bed to Chair 4   Standing Up From Chair Using Arms 4   Walk in Room 4   Climb 3-5 Stairs With Railing 4   Basic Mobility Inpatient Raw Score 24   Basic Mobility Standardized Score 57.68   Highest Level Of Mobility   JH-HLM Goal 8: Walk 250 feet or more   JH-HLM Achieved 8: Walk 250 feet ot more   End of Consult   Patient Position at End of Consult Seated edge of bed; All needs within reach           ASSESSMENT                                                                                                                     Marlen Vanegas is a 46 y.o. female admitted to [facility] on 7/24/2023 for Major depressive disorder with psychotic features ([address]).  Pt  has a past medical history of Anemia (2007), Anxiety, Asthma, Bile duct stricture (2014), Chronic kidney disease, Colon polyp (1998), Crohn's colitis ([address]), Deep vein thrombosis ([address]), Disease of thyroid gland, Disorder of sphincter of Oddi, Generalized anxiety disorder (08/26/2017), GERD (gastroesophageal reflux disease) (1995), GI (gastrointestinal bleed) (1994), Heart murmur, Inflammatory bowel disease, Irritable bowel syndrome (2016), Lactose intolerance (1982), Mast cell disease, Migraine, Myocardial infarction ([address]), Pancreatitis, Psychiatric disorder, RA (rheumatoid arthritis) ([address]), Seizures ([address]), Ulcerative colitis ([address]), and Visual impairment. . PT was consulted and pt was seen on 8/3/2023 for mobility assessment and d/c planning. Pt presents seated in dining room alert and agreeable to therapy. Impairments limiting pt at this time include impaired balance, decreased endurance, pain, decreased sensation, and decreased strength. Pt is currently functioning at a modified independent assistance level for bed mobility, modified independent assistance level for transfers, modified independent assistance level for ambulation with no assistive device,. The patient's AM-PAC Basic Mobility Inpatient Short Form Raw Score is 24. A Raw score of greater than 16 suggests the patient may benefit from discharge to home. Please also refer to the recommendation of the Physical Therapist for safe discharge planning.     Recommendations                                                                                                                DME: None    Discharge Disposition:  Home with Outpatient Physical Therapy       Bang Rowell PT, DPT

## 2023-08-03 NOTE — NURSING NOTE
Pt reports that her anxiety "was getting better". Stated, "I was doing well until I smelled the laundry detergent during group. Now I am not". Pt was encouraged to sit in area outside of the dayroom. She complied. Symptoms are slightly improved. Will continue to monitor.

## 2023-08-03 NOTE — NURSING NOTE
Patient reported she was having an anxiety attack and a hard time breathing and swallowing. Holding throat and shaking during assessment. Reported that she was "having a reaction." PRN Benadryl 50 mg IM administered at 1543.

## 2023-08-03 NOTE — TREATMENT TEAM
08/03/23 1309   Martin Anxiety Scale   Anxious Mood 3   Tension 2   Fears 2   Insomnia 0   Intellectual 1   Depressed Mood 2   Somatic Complaints: Muscular 1   Somatic Complaints: Sensory 2   Cardiovascular Symptoms 0   Respiratory Symptoms 0   Gastrointestinal Symptoms 1   Genitourinary Symptoms 0   Autonomic Symptoms 2   Behavior at Interview 2   Martin Anxiety Score 18     Reporting "bad anxiety". Stated,  "tonight is the first night I am going to be missing my daughter perform in the play the Beauty [place] the [employer]. I am so upset about missing it". BL hand tremors observed and pt reporting nausea. Administered atarax 50mg po. Will monitor effectiveness.

## 2023-08-03 NOTE — PROGRESS NOTES
08/03/23 1015   Activity/Group Checklist   Group Admission/Discharge  (Pt. completed self assessment.)   Attendance Attended   Attendance Duration (min) 46-60   Interactions Interacted appropriately   Affect/Mood Bright; Appropriate;Normal range   Goals Achieved Able to engage in interactions; Identified feelings

## 2023-08-03 NOTE — PROGRESS NOTES
Progress Note - Behavioral Health   Marlen Vanegas 46 y.o. female MRN: [id number]  Unit/Bed#: Keon Melton 466-06 Encounter: [id number]    The patient was seen for continuing care and reviewed with treatment team.  Patient continues to have intermittent anxiety. Her daughter will be performing at a show tonight with all her family present and became tearful when discussing missing this performance for the first time. But she hopes this can be recorded for her to view later. Sleep- sleeping better  Appetite - improved  Energy: fair  No suicidal or homicidal ideations. ROS:  Negative    /91 (BP Location: Left arm)   Pulse (!) 117   Temp 97.5 °F (36.4 °C) (Temporal)   Resp 18   Ht 5' 5" (1.651 m)   Wt 65.4 kg (144 lb 2.9 oz)   SpO2 100%   BMI 23.99 kg/m²     Current Mental Status Evaluation:  Appearance:  Adequate hygiene and grooming   Behavior:  Calm, Cooperative and Pleasant   Mood:  anxious and improving   Affect: More reactive today, anxious, tearful once when she discussed missing her daughter's show.     Speech: Normal volume and Normal rate   Thought Process:  Goal directed and coherent , Circumstantial at times   Thought Content:  Does not verbalize delusional material, anxiety, losing friends to suicide, not wanting her family to go through the same   Perceptual Disturbances: Denies hallucinations and does not appear to be responding to internal stimuli,    Risk Potential: No suicidal or homicidal ideation   Orientation:   Patient is alert, awake and oriented x 3   Patient has limited insight into her illness, judgment and impulse control are limited    Recent Results (from the past 72 hour(s))   UA w Reflex to Microscopic w Reflex to Culture    Collection Time: 08/02/23  6:29 AM    Specimen: Urine, Clean Catch   Result Value Ref Range    Color, UA Straw Straw, Yellow, Pale Yellow    Clarity, UA Clear Clear, Other    Specific Gravity, UA 1.010 1.003 - 1.040    pH, UA 7.0 4.5, 5.0, 5.5, 6.0, 6.5, 7.0, 7. 5, 8.0    Leukocytes, UA Negative Negative    Nitrite, UA Negative Negative    Protein, UA Negative Negative mg/dl    Glucose, UA Negative Negative mg/dl    Ketones, UA Negative Negative mg/dl    Bilirubin, UA Negative Negative    Occult Blood, UA Negative Negative    UROBILINOGEN UA Negative 1.0, Negative mg/dL         Progress Toward Goals: Progressing. No medication changes needed today      Assessment     Principal Problem:    Major depressive disorder with psychotic features (HCC)  Active Problems:    Hypokalemia    Headache    Generalized anxiety disorder    Acquired hypothyroidism    Leukocytosis    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HCC)    Crohn's colitis (HCC)    Seizure (HCC)    Bladder tumor    Encephalopathy chronic    Medical clearance for psychiatric admission    Seasonal allergies    GERD without esophagitis    Benzodiazepine misuse        Plan :    - Medications;   Psychiatric:   Sertraline 100mg daily   Abilify 10mg daily for psychosis.   Continue mirtazapine 15mg HS   Medical: per SLIM    -Therapy: occupational therapy, milieu and group therapy  - Legal: 201   -Disposition:Home when stable

## 2023-08-04 PROCEDURE — 99232 SBSQ HOSP IP/OBS MODERATE 35: CPT | Performed by: STUDENT IN AN ORGANIZED HEALTH CARE EDUCATION/TRAINING PROGRAM

## 2023-08-04 RX ORDER — HYDROXYZINE HYDROCHLORIDE 25 MG/1
25 TABLET, FILM COATED ORAL EVERY 12 HOURS
Status: DISCONTINUED | OUTPATIENT
Start: 2023-08-04 | End: 2023-08-06

## 2023-08-04 RX ADMIN — FAMOTIDINE 40 MG: 20 TABLET ORAL at 09:01

## 2023-08-04 RX ADMIN — LIOTHYRONINE SODIUM 5 MCG: 5 TABLET ORAL at 09:02

## 2023-08-04 RX ADMIN — LORAZEPAM 0.5 MG: 0.5 TABLET ORAL at 03:19

## 2023-08-04 RX ADMIN — CETIRIZINE HYDROCHLORIDE 20 MG: 10 TABLET, FILM COATED ORAL at 09:02

## 2023-08-04 RX ADMIN — LEVOTHYROXINE SODIUM 100 MCG: 100 TABLET ORAL at 06:21

## 2023-08-04 RX ADMIN — PREDNISONE 20 MG: 20 TABLET ORAL at 09:01

## 2023-08-04 RX ADMIN — SERTRALINE HYDROCHLORIDE 100 MG: 100 TABLET ORAL at 09:01

## 2023-08-04 RX ADMIN — MIRTAZAPINE 15 MG: 15 TABLET, FILM COATED ORAL at 20:56

## 2023-08-04 RX ADMIN — PANTOPRAZOLE SODIUM 40 MG: 40 TABLET, DELAYED RELEASE ORAL at 06:21

## 2023-08-04 RX ADMIN — HYDROXYZINE HYDROCHLORIDE 25 MG: 25 TABLET ORAL at 20:56

## 2023-08-04 RX ADMIN — MONTELUKAST 10 MG: 10 TABLET, FILM COATED ORAL at 21:09

## 2023-08-04 RX ADMIN — CHOLECALCIFEROL TAB 25 MCG (1000 UNIT) 1000 UNITS: 25 TAB at 09:01

## 2023-08-04 RX ADMIN — HYDROXYZINE HYDROCHLORIDE 25 MG: 25 TABLET ORAL at 09:01

## 2023-08-04 RX ADMIN — ARIPIPRAZOLE 10 MG: 10 TABLET ORAL at 09:01

## 2023-08-04 NOTE — NURSING NOTE
Pt alert and visible on the unit. Continues to wear surgical mask over face most of the day d/t "relapse" yesterday. Stated, "the smells really caused me problems yesterday". Interacts with select peers. Denies depression but admits to anxiety. When asked about anxiety shrugged shoulders and stated, "yeah it there" and smiled. Gait steady. Will continue to monitor and maintain q 7 min checks.

## 2023-08-04 NOTE — TREATMENT TEAM
08/04/23 0900   Martin Anxiety Scale   Anxious Mood 2   Tension 2   Fears 2   Insomnia 0   Intellectual 1   Depressed Mood 1   Somatic Complaints: Muscular 1   Somatic Complaints: Sensory 1   Cardiovascular Symptoms 0   Respiratory Symptoms 0   Gastrointestinal Symptoms 0   Genitourinary Symptoms 0   Autonomic Symptoms 1   Behavior at Interview 1   Martin Anxiety Score 12     Requested atarax 25mg for "mast cell prevention to be proactive instead of reactive" due to events yesterday. Will monitor effectiveness.

## 2023-08-04 NOTE — TREATMENT TEAM
Pt attended [id number] [facility] Recovery and reflection group. Pt tearful and noted she was thinking about grief and her Dad passed away and she never mourned his death. Pt was able to discuss her sadness, verbalize her thoughts and stated she always had to be the strong one who had a brave face. Peers were supportive and provided thoughtful comments. Pt was wearing a facial mask. 08/04/23 1100   Activity/Group Checklist   Group Other (Comment)  ([facility] Recovery and reflection)   Attendance Attended   Attendance Duration (min) 31-45   Interactions Other (Comment)  (tearful)   Affect/Mood Blunted/flat   Goals Achieved Identified feelings; Identified relapse prevention strategies; Discussed self-esteem issues; Discussed coping strategies; Able to listen to others; Able to engage in interactions; Able to manage/cope with feelings; Able to self-disclose; Able to reflect/comment on own behavior;Able to recieve feedback

## 2023-08-04 NOTE — PROGRESS NOTES
Progress Note - Behavioral Health   Lola Fear 46 y.o. female MRN: [id number]  Unit/Bed#: [facility] 387-31 Encounter: [id number]    The patient was seen for continuing care and reviewed with treatment team.  She has a mask on. Reports she had an allergic reaction yesterday 2/2 smell of the detergents in the laundry room, benadryl was helpful. She requested for atarax to be scheduled at 8 am and 8 pm as prescribed her outpatient provider. At home, she uses fragrance free detergents  Sleep- sleeping better  Appetite - improved  Energy: fair, she reports her daughter's play went well. She was able to   No suicidal or homicidal ideations.    ROS:  Negative    /79 (BP Location: Right arm)   Pulse (!) 110   Temp 97.5 °F (36.4 °C) (Temporal)   Resp 18   Ht 5' 5" (1.651 m)   Wt 65.4 kg (144 lb 2.9 oz)   SpO2 98%   BMI 23.99 kg/m²     Current Mental Status Evaluation:  Appearance:  Adequate hygiene and grooming   Behavior:  Calm, Cooperative and Pleasant   Mood:  anxious and improving   Affect: bright   Speech: Normal volume and Normal rate   Thought Process:  Goal directed and coherent , Circumstantial at times   Thought Content:  Does not verbalize delusional material, More positive , able to discuss ways to manage her stressors and anxiety   Perceptual Disturbances: Denies hallucinations and does not appear to be responding to internal stimuli,    Risk Potential: No suicidal or homicidal ideation   Orientation:   Patient is alert, awake and oriented x 3   Patient has fair insight into her illness, judgment and impulse control are Improving    Recent Results (from the past 72 hour(s))   UA w Reflex to Microscopic w Reflex to Culture    Collection Time: 08/02/23  6:29 AM    Specimen: Urine, Clean Catch   Result Value Ref Range    Color, UA Straw Straw, Yellow, Pale Yellow    Clarity, UA Clear Clear, Other    Specific Gravity, UA 1.010 1.003 - 1.040    pH, UA 7.0 4.5, 5.0, 5.5, 6.0, 6.5, 7.0, 7.5, 8.0 Leukocytes, UA Negative Negative    Nitrite, UA Negative Negative    Protein, UA Negative Negative mg/dl    Glucose, UA Negative Negative mg/dl    Ketones, UA Negative Negative mg/dl    Bilirubin, UA Negative Negative    Occult Blood, UA Negative Negative    UROBILINOGEN UA Negative 1.0, Negative mg/dL         Progress Toward Goals: Progressing. No medication changes needed today, tentative discharge next week      Assessment     Principal Problem:    Major depressive disorder with psychotic features ([address])  Active Problems:    Hypokalemia    Headache    Generalized anxiety disorder    Acquired hypothyroidism    Leukocytosis    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HCC)    Crohn's colitis ([address])    Seizure ([address])    Bladder tumor    Encephalopathy chronic    Medical clearance for psychiatric admission    Seasonal allergies    GERD without esophagitis    Benzodiazepine misuse        Plan :    - Medications;   Psychiatric:   Sertraline 100mg daily   Abilify 10mg daily for psychosis.   Continue mirtazapine 15mg HS     Medical: per SLIM    -Therapy: occupational therapy, milieu and group therapy  - Legal: 201   -Disposition: Home when stable

## 2023-08-04 NOTE — PROGRESS NOTES
08/04/23 1015 08/04/23 1330   Activity/Group Checklist   Group [facility]  (focus task trivia) Life Skills  (task on personal preferences.)   Attendance Attended Attended   Attendance Duration (min) 31-45 31-45   Interactions Interacted appropriately Interacted appropriately  (Pt; stated a love of gardening and wrote her instructions for care as if she were a plant yet needed to add specific details to her persoanl preferences in life.)   Affect/Mood Appropriate;Bright;Normal range Normal range   Goals Achieved Able to engage in interactions Able to listen to others; Able to engage in interactions; Identified feelings; Discussed coping strategies

## 2023-08-04 NOTE — SOCIAL WORK
Cm returned call for mother Kimberley Cockayne who wanted to get an update on status and to schedule a visit.  Status was updated and Kimberley Cockayne was scheduled for a visit on Tuesday 8/8 at [facility] [phone]

## 2023-08-04 NOTE — PLAN OF CARE
Problem: Alteration in Thoughts and Perception  Goal: Treatment Goal: Gain control of psychotic behaviors/thinking, reduce/eliminate presenting symptoms and demonstrate improved reality functioning upon discharge  Outcome: Progressing  Goal: Verbalize thoughts and feelings  Description: Interventions:  - Promote a nonjudgmental and trusting relationship with the patient through active listening and therapeutic communication  - Assess patient's level of functioning, behavior and potential for risk  - Engage patient in 1 on 1 interactions  - Encourage patient to express fears, feelings, frustrations, and discuss symptoms    - [place] patient to reality, help patient recognize reality-based thinking   - Administer medications as ordered and assess for potential side effects  - Provide the patient education related to the signs and symptoms of the illness and desired effects of prescribed medications  Outcome: Progressing  Goal: Refrain from acting on delusional thinking/internal stimuli  Description: Interventions:  - Monitor patient closely, per order   - Utilize least restrictive measures   - Set reasonable limits, give positive feedback for acceptable   - Administer medications as ordered and monitor of potential side effects  Outcome: Progressing  Goal: Agree to be compliant with medication regime, as prescribed and report medication side effects  Description: Interventions:  - Offer appropriate PRN medication and supervise ingestion; conduct AIMS, as needed   Outcome: Progressing  Goal: Attend and participate in unit activities, including therapeutic, recreational, and educational groups  Description: Interventions:  -Encourage Visitation and family involvement in care  Outcome: Progressing  Goal: Recognize dysfunctional thoughts, communicate reality-based thoughts at the time of discharge  Description: Interventions:  - Provide medication and psycho-education to assist patient in compliance and developing insight into his/her illness   Outcome: Progressing  Goal: Complete daily ADLs, including personal hygiene independently, as able  Description: Interventions:  - Observe, teach, and assist patient with ADLS  - Monitor and promote a balance of rest/activity, with adequate nutrition and elimination   Outcome: Progressing     Problem: Ineffective Coping  Goal: Identifies ineffective coping skills  Outcome: Progressing  Goal: Identifies healthy coping skills  Outcome: Progressing  Goal: Demonstrates healthy coping skills  Outcome: Progressing  Goal: Participates in unit activities  Description: Interventions:  - Provide therapeutic environment   - Provide required programming   - Redirect inappropriate behaviors   Outcome: Progressing  Goal: Patient/Family participate in treatment and DC plans  Description: Interventions:  - Provide therapeutic environment  Outcome: Progressing  Goal: Patient/Family verbalizes awareness of resources  Outcome: Progressing  Goal: Understands least restrictive measures  Description: Interventions:  - Utilize least restrictive behavior  Outcome: Progressing  Goal: Free from restraint events  Description: - Utilize least restrictive measures   - Provide behavioral interventions   - Redirect inappropriate behaviors   Outcome: Progressing     Problem: Risk for Self Injury/Neglect  Goal: Treatment Goal: Remain safe during length of stay, learn and adopt new coping skills, and be free of self-injurious ideation, impulses and acts at the time of discharge  Outcome: Progressing  Goal: Verbalize thoughts and feelings  Description: Interventions:  - Assess and re-assess patient's lethality and potential for self-injury  - Engage patient in 1:1 interactions, daily, for a minimum of 15 minutes  - Encourage patient to express feelings, fears, frustrations, hopes  - Establish rapport/trust with patient   Outcome: Progressing  Goal: Refrain from harming self  Description: Interventions:  - Monitor patient closely, per order  - Develop a trusting relationship  - Supervise medication ingestion, monitor effects and side effects   Outcome: Progressing  Goal: Attend and participate in unit activities, including therapeutic, recreational, and educational groups  Description: Interventions:  - Provide therapeutic and educational activities daily, encourage attendance and participation, and document same in the medical record  - Obtain collateral information, encourage visitation and family involvement in care   Outcome: Progressing  Goal: Recognize maladaptive responses and adopt new coping mechanisms  Outcome: Progressing  Goal: Complete daily ADLs, including personal hygiene independently, as able  Description: Interventions:  - Observe, teach, and assist patient with ADLS  - Monitor and promote a balance of rest/activity, with adequate nutrition and elimination  Outcome: Progressing     Problem: Depression  Goal: Treatment Goal: Demonstrate behavioral control of depressive symptoms, verbalize feelings of improved mood/affect, and adopt new coping skills prior to discharge  Outcome: Progressing  Goal: Verbalize thoughts and feelings  Description: Interventions:  - Assess and re-assess patient's level of risk   - Engage patient in 1:1 interactions, daily, for a minimum of 15 minutes   - Encourage patient to express feelings, fears, frustrations, hopes   Outcome: Progressing  Goal: Refrain from harming self  Description: Interventions:  - Monitor patient closely, per order   - Supervise medication ingestion, monitor effects and side effects   Outcome: Progressing  Goal: Refrain from isolation  Description: Interventions:  - Develop a trusting relationship   - Encourage socialization   Outcome: Progressing  Goal: Refrain from self-neglect  Outcome: Progressing  Goal: Attend and participate in unit activities, including therapeutic, recreational, and educational groups  Description: Interventions:  - Provide therapeutic and educational activities daily, encourage attendance and participation, and document same in the medical record   Outcome: Progressing  Goal: Complete daily ADLs, including personal hygiene independently, as able  Description: Interventions:  - Observe, teach, and assist patient with ADLS  -  Monitor and promote a balance of rest/activity, with adequate nutrition and elimination   Outcome: Progressing     Problem: Anxiety  Goal: Anxiety is at manageable level  Description: Interventions:  - Assess and monitor patient's anxiety level. - Monitor for signs and symptoms (heart palpitations, chest pain, shortness of breath, headaches, nausea, feeling jumpy, restlessness, irritable, apprehensive). - Collaborate with interdisciplinary team and initiate plan and interventions as ordered.   - [place] patient to unit/surroundings  - Explain treatment plan  - Encourage participation in care  - Encourage verbalization of concerns/fears  - Identify coping mechanisms  - Assist in developing anxiety-reducing skills  - Administer/offer alternative therapies  - Limit or eliminate stimulants  Outcome: Progressing     Problem: Risk for Violence/Aggression Toward Others  Goal: Verbalize thoughts and feelings  Description: Interventions:  - Assess and re-assess patient's level of risk, every waking shift  - Engage patient in 1:1 interactions, daily, for a minimum of 15 minutes   - Allow patient to express feelings and frustrations in a safe and non-threatening manner   - Establish rapport/trust with patient   Outcome: Progressing  Goal: Refrain from harming others  Outcome: Progressing  Goal: Refrain from destructive acts on the environment or property  Outcome: Progressing  Goal: Control angry outbursts  Description: Interventions:  - Monitor patient closely, per order  - Ensure early verbal de-escalation  - Monitor prn medication needs  - Set reasonable/therapeutic limits, outline behavioral expectations, and consequences   - Provide a non-threatening milieu, utilizing the least restrictive interventions   Outcome: Progressing  Goal: Attend and participate in unit activities, including therapeutic, recreational, and educational groups  Description: Interventions:  - Provide therapeutic and educational activities daily, encourage attendance and participation, and document same in the medical record   Outcome: Progressing  Goal: Identify appropriate positive anger management techniques  Description: Interventions:  - Offer anger management and coping skills groups   - Staff will provide positive feedback for appropriate anger control  Outcome: Progressing     Problem: Alteration in Orientation  Goal: Treatment Goal: Demonstrate a reduction of confusion and improved orientation to person, place, time and/or situation upon discharge, according to optimum baseline/ability  Outcome: Progressing  Goal: Interact with staff daily  Description: Interventions:  - Assess and re-assess patient's level of orientation  - Engage patient in 1 on 1 interactions, daily, for a minimum of 15 minutes   - Establish rapport/trust with patient   Outcome: Progressing  Goal: Express concerns related to confused thinking related to:  Description: Interventions:  - Encourage patient to express feelings, fears, frustrations, hopes  - Assign consistent caregivers   - [place]/re-orient patient as needed  - Allow comfort items, as appropriate  - Provide visual cues, signs, etc.   Outcome: Progressing  Goal: Allow medical examinations, as recommended  Description: Interventions:  - Provide physical/neurological exams and/or referrals, per provider   Outcome: Progressing  Goal: Cooperate with recommended testing/procedures  Description: Interventions:  - Determine need for ancillary testing  - Observe for mental status changes  - Implement falls/precaution protocol   Outcome: Progressing  Goal: Attend and participate in unit activities, including therapeutic, recreational, and educational groups  Description: Interventions:  - Provide therapeutic and educational activities daily, encourage attendance and participation, and document same in the medical record   - Provide appropriate opportunities for reminiscence   - Provide a consistent daily routine   - Encourage family contact/visitation   Outcome: Progressing  Goal: Complete daily ADLs, including personal hygiene independently, as able  Description: Interventions:  - Observe, teach, and assist patient with ADLS  Outcome: Progressing     Problem: Individualized Interventions  Goal: Patient will verbalize appropriate use of telephone within 5 days  Description: Interventions:  - Treatment team to determine use of supervised phone privileges   Outcome: Completed  Goal: Patient will verbalize need for hospitalization and will no longer attempt elopement within 5 days  Description: Interventions:  - Ongoing education to help patient understand need for hospitalization  Outcome: Progressing  Goal: Patient will recognize inappropriate behaviors and develop alternative behaviors within 5 days  Description: Interventions:  - Patient in collaboration with Treatment Team will develop a behavior management plan to help identify effective coping skills to deal with stressors  Outcome: Completed

## 2023-08-04 NOTE — PROGRESS NOTES
08/04/23 0802   Team Meeting   Meeting Type Daily Rounds   Initial Conference Date 08/04/23   Team Members Present   Team Members Present Physician;Nurse;;; Occupational Therapist   Physician Team Member [id number] Team Member [facility] Management Team Member [facility] [address] Work Team Member Tamela   OT Team Member Sunshine Dunlap   Patient/Family Present   Patient Present No   Patient's Family Present No     Patient was upset as she was missing her daughter's performance and received Attarax. She was claiming that she couldn't breathe do to the "laundry detergent" and received benadryl. It was effective and it was better 15 minutes later. She has received Attarax 25mg three times between last evening and this morning. Discharge is pending.

## 2023-08-05 PROCEDURE — 99232 SBSQ HOSP IP/OBS MODERATE 35: CPT | Performed by: NURSE PRACTITIONER

## 2023-08-05 PROCEDURE — 99232 SBSQ HOSP IP/OBS MODERATE 35: CPT | Performed by: STUDENT IN AN ORGANIZED HEALTH CARE EDUCATION/TRAINING PROGRAM

## 2023-08-05 RX ORDER — DIPHENHYDRAMINE HCL 25 MG
50 TABLET ORAL EVERY 6 HOURS PRN
Status: DISCONTINUED | OUTPATIENT
Start: 2023-08-05 | End: 2023-08-06

## 2023-08-05 RX ORDER — TRAZODONE HYDROCHLORIDE 50 MG/1
50 TABLET ORAL
Status: DISCONTINUED | OUTPATIENT
Start: 2023-08-05 | End: 2023-08-15 | Stop reason: HOSPADM

## 2023-08-05 RX ADMIN — FAMOTIDINE 40 MG: 20 TABLET ORAL at 09:06

## 2023-08-05 RX ADMIN — DIPHENHYDRAMINE HYDROCHLORIDE 50 MG: 25 TABLET ORAL at 21:34

## 2023-08-05 RX ADMIN — DIPHENHYDRAMINE HYDROCHLORIDE 50 MG: 50 INJECTION, SOLUTION INTRAMUSCULAR; INTRAVENOUS at 11:03

## 2023-08-05 RX ADMIN — LEVOTHYROXINE SODIUM 100 MCG: 100 TABLET ORAL at 06:39

## 2023-08-05 RX ADMIN — HYDROXYZINE HYDROCHLORIDE 50 MG: 25 TABLET ORAL at 09:06

## 2023-08-05 RX ADMIN — HYDROXYZINE HYDROCHLORIDE 25 MG: 25 TABLET ORAL at 19:39

## 2023-08-05 RX ADMIN — LORAZEPAM 1 MG: 1 TABLET ORAL at 12:41

## 2023-08-05 RX ADMIN — ARIPIPRAZOLE 10 MG: 10 TABLET ORAL at 09:05

## 2023-08-05 RX ADMIN — SERTRALINE HYDROCHLORIDE 100 MG: 100 TABLET ORAL at 09:06

## 2023-08-05 RX ADMIN — PREDNISONE 20 MG: 20 TABLET ORAL at 09:07

## 2023-08-05 RX ADMIN — CHOLECALCIFEROL TAB 25 MCG (1000 UNIT) 1000 UNITS: 25 TAB at 09:07

## 2023-08-05 RX ADMIN — TRAZODONE HYDROCHLORIDE 50 MG: 50 TABLET ORAL at 21:03

## 2023-08-05 RX ADMIN — HYDROXYZINE HYDROCHLORIDE 25 MG: 25 TABLET ORAL at 01:40

## 2023-08-05 RX ADMIN — LIOTHYRONINE SODIUM 5 MCG: 5 TABLET ORAL at 09:10

## 2023-08-05 RX ADMIN — PANTOPRAZOLE SODIUM 40 MG: 40 TABLET, DELAYED RELEASE ORAL at 06:39

## 2023-08-05 RX ADMIN — MONTELUKAST 10 MG: 10 TABLET, FILM COATED ORAL at 21:03

## 2023-08-05 RX ADMIN — ACETAMINOPHEN 975 MG: 325 TABLET ORAL at 02:26

## 2023-08-05 RX ADMIN — CETIRIZINE HYDROCHLORIDE 20 MG: 10 TABLET, FILM COATED ORAL at 09:10

## 2023-08-05 NOTE — NURSING NOTE
Patient presented with scattered hives on bilateral arms and trunk. . Patient reports she took Tylenol last night for pain. Patient reports that this happens when she takes Tylenol but the benefit outweighed the risk. Patient requested and was given Atarax 50 MG PO. Will reassess.

## 2023-08-05 NOTE — PROGRESS NOTES
activation syndrome (HCC)    Crohn's colitis ([address])    Seizure ([address])    Bladder tumor    Encephalopathy chronic    Medical clearance for psychiatric admission    Seasonal allergies    GERD without esophagitis    Benzodiazepine misuse        Plan :    - Medications;   Psychiatric:   Increase Sertraline to 125mg daily   Abilify 10mg daily for psychosis.   Discontinue mirtazapine 15mg HS  Trazodone 50mg HS for sleep     Medical: per SLIM    -Therapy: occupational therapy, milieu and group therapy  - Legal: 201   -Disposition: Home when stable

## 2023-08-05 NOTE — NURSING NOTE
Patient c/o anxiety and requested 25 mg of Atarax. Martin score 14. PRN Atarax 25 mg administered at [facility].

## 2023-08-05 NOTE — TREATMENT TEAM
08/05/23 1241   Martin Anxiety Scale   Anxious Mood 3   Tension 3   Fears 3   Insomnia 3   Intellectual 2   Depressed Mood 3   Somatic Complaints: Muscular 2   Somatic Complaints: Sensory 3   Cardiovascular Symptoms 0   Respiratory Symptoms 0   Gastrointestinal Symptoms 0   Genitourinary Symptoms 0   Autonomic Symptoms 2   Behavior at Interview 3   Martin Anxiety Score 27     Patient was offered and accepted Ativan 1 MG PO for severe anxiety. Will reassess.

## 2023-08-05 NOTE — PLAN OF CARE
Problem: Alteration in Thoughts and Perception  Goal: Treatment Goal: Gain control of psychotic behaviors/thinking, reduce/eliminate presenting symptoms and demonstrate improved reality functioning upon discharge  Outcome: Progressing  Goal: Verbalize thoughts and feelings  Description: Interventions:  - Promote a nonjudgmental and trusting relationship with the patient through active listening and therapeutic communication  - Assess patient's level of functioning, behavior and potential for risk  - Engage patient in 1 on 1 interactions  - Encourage patient to express fears, feelings, frustrations, and discuss symptoms    - [place] patient to reality, help patient recognize reality-based thinking   - Administer medications as ordered and assess for potential side effects  - Provide the patient education related to the signs and symptoms of the illness and desired effects of prescribed medications  Outcome: Progressing  Goal: Refrain from acting on delusional thinking/internal stimuli  Description: Interventions:  - Monitor patient closely, per order   - Utilize least restrictive measures   - Set reasonable limits, give positive feedback for acceptable   - Administer medications as ordered and monitor of potential side effects  Outcome: Progressing  Goal: Agree to be compliant with medication regime, as prescribed and report medication side effects  Description: Interventions:  - Offer appropriate PRN medication and supervise ingestion; conduct AIMS, as needed   Outcome: Progressing  Goal: Recognize dysfunctional thoughts, communicate reality-based thoughts at the time of discharge  Description: Interventions:  - Provide medication and psycho-education to assist patient in compliance and developing insight into his/her illness   Outcome: Progressing  Goal: Complete daily ADLs, including personal hygiene independently, as able  Description: Interventions:  - Observe, teach, and assist patient with ADLS  - Monitor and promote a balance of rest/activity, with adequate nutrition and elimination   Outcome: Progressing     Problem: Ineffective Coping  Goal: Identifies ineffective coping skills  Outcome: Progressing  Goal: Identifies healthy coping skills  Outcome: Progressing  Goal: Demonstrates healthy coping skills  Outcome: Progressing  Goal: Patient/Family verbalizes awareness of resources  Outcome: Progressing  Goal: Understands least restrictive measures  Description: Interventions:  - Utilize least restrictive behavior  Outcome: Progressing  Goal: Free from restraint events  Description: - Utilize least restrictive measures   - Provide behavioral interventions   - Redirect inappropriate behaviors   Outcome: Progressing     Problem: Risk for Self Injury/Neglect  Goal: Treatment Goal: Remain safe during length of stay, learn and adopt new coping skills, and be free of self-injurious ideation, impulses and acts at the time of discharge  Outcome: Progressing  Goal: Verbalize thoughts and feelings  Description: Interventions:  - Assess and re-assess patient's lethality and potential for self-injury  - Engage patient in 1:1 interactions, daily, for a minimum of 15 minutes  - Encourage patient to express feelings, fears, frustrations, hopes  - Establish rapport/trust with patient   Outcome: Progressing  Goal: Refrain from harming self  Description: Interventions:  - Monitor patient closely, per order  - Develop a trusting relationship  - Supervise medication ingestion, monitor effects and side effects   Outcome: Progressing  Goal: Recognize maladaptive responses and adopt new coping mechanisms  Outcome: Progressing  Goal: Complete daily ADLs, including personal hygiene independently, as able  Description: Interventions:  - Observe, teach, and assist patient with ADLS  - Monitor and promote a balance of rest/activity, with adequate nutrition and elimination  Outcome: Progressing     Problem: Depression  Goal: Treatment Goal: Demonstrate behavioral control of depressive symptoms, verbalize feelings of improved mood/affect, and adopt new coping skills prior to discharge  Outcome: Progressing  Goal: Verbalize thoughts and feelings  Description: Interventions:  - Assess and re-assess patient's level of risk   - Engage patient in 1:1 interactions, daily, for a minimum of 15 minutes   - Encourage patient to express feelings, fears, frustrations, hopes   Outcome: Progressing  Goal: Refrain from harming self  Description: Interventions:  - Monitor patient closely, per order   - Supervise medication ingestion, monitor effects and side effects   Outcome: Progressing  Goal: Refrain from isolation  Description: Interventions:  - Develop a trusting relationship   - Encourage socialization   Outcome: Progressing  Goal: Refrain from self-neglect  Outcome: Progressing  Goal: Complete daily ADLs, including personal hygiene independently, as able  Description: Interventions:  - Observe, teach, and assist patient with ADLS  -  Monitor and promote a balance of rest/activity, with adequate nutrition and elimination   Outcome: Progressing     Problem: Anxiety  Goal: Anxiety is at manageable level  Description: Interventions:  - Assess and monitor patient's anxiety level. - Monitor for signs and symptoms (heart palpitations, chest pain, shortness of breath, headaches, nausea, feeling jumpy, restlessness, irritable, apprehensive). - Collaborate with interdisciplinary team and initiate plan and interventions as ordered.   - [place] patient to unit/surroundings  - Explain treatment plan  - Encourage participation in care  - Encourage verbalization of concerns/fears  - Identify coping mechanisms  - Assist in developing anxiety-reducing skills  - Administer/offer alternative therapies  - Limit or eliminate stimulants  Outcome: Progressing     Problem: Risk for Violence/Aggression Toward Others  Goal: Verbalize thoughts and feelings  Description: Interventions:  - Assess and re-assess patient's level of risk, every waking shift  - Engage patient in 1:1 interactions, daily, for a minimum of 15 minutes   - Allow patient to express feelings and frustrations in a safe and non-threatening manner   - Establish rapport/trust with patient   Outcome: Progressing  Goal: Refrain from harming others  Outcome: Progressing  Goal: Refrain from destructive acts on the environment or property  Outcome: Progressing  Goal: Control angry outbursts  Description: Interventions:  - Monitor patient closely, per order  - Ensure early verbal de-escalation  - Monitor prn medication needs  - Set reasonable/therapeutic limits, outline behavioral expectations, and consequences   - Provide a non-threatening milieu, utilizing the least restrictive interventions   Outcome: Progressing  Goal: Identify appropriate positive anger management techniques  Description: Interventions:  - Offer anger management and coping skills groups   - Staff will provide positive feedback for appropriate anger control  Outcome: Progressing     Problem: Alteration in Orientation  Goal: Treatment Goal: Demonstrate a reduction of confusion and improved orientation to person, place, time and/or situation upon discharge, according to optimum baseline/ability  Outcome: Progressing  Goal: Interact with staff daily  Description: Interventions:  - Assess and re-assess patient's level of orientation  - Engage patient in 1 on 1 interactions, daily, for a minimum of 15 minutes   - Establish rapport/trust with patient   Outcome: Progressing  Goal: Express concerns related to confused thinking related to:  Description: Interventions:  - Encourage patient to express feelings, fears, frustrations, hopes  - Assign consistent caregivers   - [place]/re-orient patient as needed  - Allow comfort items, as appropriate  - Provide visual cues, signs, etc.   Outcome: Progressing  Goal: Allow medical examinations, as recommended  Description: Interventions:  - Provide physical/neurological exams and/or referrals, per provider   Outcome: Progressing  Goal: Cooperate with recommended testing/procedures  Description: Interventions:  - Determine need for ancillary testing  - Observe for mental status changes  - Implement falls/precaution protocol   Outcome: Progressing  Goal: Complete daily ADLs, including personal hygiene independently, as able  Description: Interventions:  - Observe, teach, and assist patient with ADLS  Outcome: Progressing     Problem: Individualized Interventions  Goal: Patient will verbalize need for hospitalization and will no longer attempt elopement within 5 days  Description: Interventions:  - Ongoing education to help patient understand need for hospitalization  Outcome: Progressing

## 2023-08-05 NOTE — NURSING NOTE
Patient is present on the unit periodically. Patient converses with peers minimally. Patient endorses anxiety and denies SI/HI, AH/VH, and depression. Patient is compliant with medications and cooperative with care/treatment.  Patient requests several PRN medications this morning (Atarax, Ativan.) Patient is tearful S/P seizure like activity (see note.)

## 2023-08-05 NOTE — NURSING NOTE
Upon assessment, patient requested Atarax 50 mg for hives. Informed patient she had Atarax 25 mg scheduled that was able to be given and to try that first. Patient hesitant but agreeable. After administration, patient requested another 50 mg of Atarax be given with her HS medications. Education provided and informed patient we would reassess condition at that time.

## 2023-08-05 NOTE — NURSING NOTE
Patient visible and social with select peers. Reports anxiety but states "between the Benadryl and the Atarax I'm starting to get ahead of things." Reports that she is hoping to return home soon to "live a normal life." Medication compliant. Encouraged to inform staff of any concerns.

## 2023-08-05 NOTE — PROGRESS NOTES
[address] [facility]  Progress Note  Name: Rebekah Gunn  MRN: [id number]  Unit/Bed#: [facility] 641-23 I Date of Admission: 2023   Date of Service: 2023 I Hospital Day: 12    Assessment/Plan   Mast cell activation syndrome [facility])  Assessment & Plan  · Patient has a history of mast cell activation syndrome involved with mast cell activation specialist at [facility] AT [place] patient reports she had a "mast cell activation attack"  states patient became unresponsive extremities locked up for approximately 3 to 5 minutes reports this is how her syndrome typically presents and that she usually needs Benadryl steroids and IV fluids for treatment. · Patient is currently at baseline  · She will be on prednisone 20 mg p.o. twice daily x3 days then 20 mg p.o. twice daily x1 day then back to her baseline of prednisone 20 mg p.o. daily  · She will have a as needed order for Benadryl 50 mg IM every 6 hours as needed itching or allergies next she will have an EpiPen that will be readily available on the unit  · Patient will continue with Xolair 150 mg subcu every 28 days             Nurse Coordination of Care Discussion: 30 mins    Discussions with Specialists or Other Care Team Provider: DR Lisa Calvin and the Rapid team    Education and Discussions with  Patient: 20 mins    Time Spent for Care: 45 minutes. More than 50% of total time spent on counseling and coordination of care as described above. Current Length of Stay: 12 day(s)    Code Status: Level 1 - Full Code      Subjective:   "I am having a mass cell reaction, to a smell in the room"    Objective:     Pt shaking, still able to communicate a Rapid was called and then cancelled.   Benadryl 50 mg IM given x 1,     Vitals:   Temp (24hrs), Av.1 °F (36.7 °C), Min:97.5 °F (36.4 °C), Max:98.7 °F (37.1 °C)    Temp:  [97.5 °F (36.4 °C)-98.7 °F (37.1 °C)] 98.7 °F (37.1 °C)  HR:  [116-126] 116  Resp:  [16-20] 20  BP: (122-148)/(80-98) 148/98  SpO2:  [94 %-98 %] 98 %  Body mass index is 23.99 kg/m². Physical Exam:     Physical Exam  HENT:      Head: Normocephalic. Right Ear: Tympanic membrane normal.      Left Ear: Tympanic membrane normal.      Mouth/Throat:      Mouth: Mucous membranes are moist.   Eyes:      Extraocular Movements: Extraocular movements intact. Pupils: Pupils are equal, round, and reactive to light. Cardiovascular:      Rate and Rhythm: Regular rhythm. Tachycardia present. Pulses: Normal pulses. Heart sounds: Normal heart sounds. Pulmonary:      Effort: Pulmonary effort is normal.      Breath sounds: Normal breath sounds. Abdominal:      General: Abdomen is flat. Bowel sounds are normal.      Palpations: Abdomen is soft. Musculoskeletal:         General: Normal range of motion. Cervical back: Rigidity present. Skin:     General: Skin is warm and dry. Capillary Refill: Capillary refill takes more than 3 seconds. Neurological:      Mental Status: She is alert and oriented to person, place, and time. Mental status is at baseline. Psychiatric:         Mood and Affect: Mood normal.           Additional Data:     Labs:                            * I Have Reviewed All Lab Data Listed Above. * Additional Pertinent Lab Tests Reviewed:  [facility] Admission Reviewed    Imaging:    Imaging Reports Reviewed Today Include:       Last 24 Hours Medication List:   Current Facility-Administered Medications   Medication Dose Route Frequency Provider Last Rate   • acetaminophen  650 mg Oral Q4H PRN JONATHAN Toscano     • acetaminophen  650 mg Oral Q4H PRN JONATHAN Toscano     • acetaminophen  975 mg Oral Q6H PRN JONATHAN Toscano     • albuterol  2 puff Inhalation Q4H PRN JONATHAN Fam     • ARIPiprazole  10 mg Oral Daily Matt Her MD     • butalbital-acetaminophen-caffeine  1 tablet Oral Q8H PRN JONATHAN Toscano     • cetirizine  20 mg Oral Daily Luna Butler MAXINENP     • cholecalciferol  1,000 Units Oral Daily Jameel Ceballos PA-C     • diphenhydrAMINE  50 mg Intramuscular Q6H PRN Jameel Ceballos PA-C     • EPINEPHrine PF  0.3 mg Intramuscular Daily PRN JONATHAN Fam     • famotidine  40 mg Oral Daily JONATHAN Fam     • haloperidol lactate  5 mg Intramuscular Q4H PRN Max 4/day JONATHAN Gill     • hydrOXYzine HCL  25 mg Oral Q6H PRN Max 4/day JONATHAN Gill     • hydrOXYzine HCL  25 mg Oral Q12H Alondra Calle MD     • hydrOXYzine HCL  50 mg Oral Q6H PRN Max 4/day JONATHAN Gill     • levothyroxine  100 mcg Oral Early Morning JONATHAN Fam     • liothyronine  5 mcg Oral Daily JONATHAN Fam     • LORazepam  1 mg Intramuscular Q6H PRN Max 3/day Joellen Roberson PA-C     • LORazepam  0.5 mg Oral BID PRN Alondra Calle MD     • LORazepam  1 mg Oral Q6H PRN Max 3/day JONATHAN Gill     • mirtazapine  15 mg Oral HS JONATHAN Breen     • montelukast  10 mg Oral HS JONATHAN Fam     • pantoprazole  40 mg Oral Early Morning JONATHAN Fam     • predniSONE  20 mg Oral Daily JONATHAN Fam     • risperiDONE  0.25 mg Oral Q4H PRN Max 6/day JONATHAN Gill     • risperiDONE  0.5 mg Oral Q4H PRN Max 3/day JONATHAN Gill     • risperiDONE  1 mg Oral Q2H PRN Max 3/day JONATHAN Gill     • sertraline  100 mg Oral Daily Alondra Calle MD     • traMADol  50 mg Oral Q6H PRN Tyronne Krabbe, CRNP          Today, Patient Was Seen By: Tyronne Krabbe, CRNP      ** Please Note: Dictation voice to text software may have been used in the creation of this document.  **    I have spent a total time of 45 minutes on 08/05/23 in caring for this patient including Instructions for management, Patient and family education, Counseling / Coordination of care, Documenting in the medical record, Reviewing / ordering tests, medicine, procedures  , Obtaining or reviewing history   and Communicating with other healthcare professionals .

## 2023-08-05 NOTE — NURSING NOTE
This morning around 1050, rapid response was called for seizure like activity. No post-ictal state, no tongue biting, and patient did not loose consciousness. Patient was given Benadryl 50 MG IM-right deltoid (per provider instruction.) Shortly after, patient asked this writer for Ativan, stating, "I need Ativan for seizures." This writer explained to patient that it was too soon after receiving Benadryl to get Ativan.

## 2023-08-05 NOTE — NURSING NOTE
Patient reports that Atarax 50 MG PO helped to reduce the hives she was experiencing. Medication effective.

## 2023-08-05 NOTE — CODE DOCUMENTATION
Persons in attendance:  Respiratory- [facility] Supervisor- [address] [facility] RN  Primary RN- Jeannie Sam  [facility]- Florentin Du  T- [id number] [facility]

## 2023-08-05 NOTE — ASSESSMENT & PLAN NOTE
· Patient has a history of mast cell activation syndrome involved with mast cell activation specialist at [facility] AT [place] patient reports she had a "mast cell activation attack"  states patient became unresponsive extremities locked up for approximately 3 to 5 minutes reports this is how her syndrome typically presents and that she usually needs Benadryl steroids and IV fluids for treatment.   · Patient is currently at baseline  · She will be on prednisone 20 mg p.o. twice daily x3 days then 20 mg p.o. twice daily x1 day then back to her baseline of prednisone 20 mg p.o. daily  · She will have a as needed order for Benadryl 50 mg IM every 6 hours as needed itching or allergies next she will have an EpiPen that will be readily available on the unit  · Patient will continue with Xolair 150 mg subcu every 28 days

## 2023-08-06 PROCEDURE — 99232 SBSQ HOSP IP/OBS MODERATE 35: CPT | Performed by: STUDENT IN AN ORGANIZED HEALTH CARE EDUCATION/TRAINING PROGRAM

## 2023-08-06 RX ORDER — DIPHENHYDRAMINE HCL 25 MG
50 TABLET ORAL EVERY 6 HOURS PRN
Status: DISCONTINUED | OUTPATIENT
Start: 2023-08-06 | End: 2023-08-10

## 2023-08-06 RX ORDER — LORAZEPAM 0.5 MG/1
0.25 TABLET ORAL DAILY
Status: DISCONTINUED | OUTPATIENT
Start: 2023-08-06 | End: 2023-08-13

## 2023-08-06 RX ADMIN — ARIPIPRAZOLE 10 MG: 10 TABLET ORAL at 08:37

## 2023-08-06 RX ADMIN — HYDROXYZINE HYDROCHLORIDE 50 MG: 25 TABLET ORAL at 07:41

## 2023-08-06 RX ADMIN — PANTOPRAZOLE SODIUM 40 MG: 40 TABLET, DELAYED RELEASE ORAL at 06:24

## 2023-08-06 RX ADMIN — MONTELUKAST 10 MG: 10 TABLET, FILM COATED ORAL at 21:09

## 2023-08-06 RX ADMIN — LEVOTHYROXINE SODIUM 100 MCG: 100 TABLET ORAL at 06:24

## 2023-08-06 RX ADMIN — DIPHENHYDRAMINE HYDROCHLORIDE 50 MG: 25 TABLET ORAL at 11:02

## 2023-08-06 RX ADMIN — TRAZODONE HYDROCHLORIDE 50 MG: 50 TABLET ORAL at 21:09

## 2023-08-06 RX ADMIN — CETIRIZINE HYDROCHLORIDE 20 MG: 10 TABLET, FILM COATED ORAL at 08:37

## 2023-08-06 RX ADMIN — CHOLECALCIFEROL TAB 25 MCG (1000 UNIT) 1000 UNITS: 25 TAB at 08:37

## 2023-08-06 RX ADMIN — PREDNISONE 20 MG: 20 TABLET ORAL at 08:37

## 2023-08-06 RX ADMIN — FAMOTIDINE 40 MG: 20 TABLET ORAL at 08:37

## 2023-08-06 RX ADMIN — SERTRALINE HYDROCHLORIDE 125 MG: 100 TABLET ORAL at 08:37

## 2023-08-06 RX ADMIN — LORAZEPAM 0.25 MG: 0.5 TABLET ORAL at 13:11

## 2023-08-06 RX ADMIN — LIOTHYRONINE SODIUM 5 MCG: 5 TABLET ORAL at 08:37

## 2023-08-06 NOTE — NURSING NOTE
Patient is visible on the unit and social with peers. Patient continues to ask for multiple PRN medications. When offered Atarax and Benadryl, she stated, "those med's don't work anymore." Patient is somatic and seeks Ativan. Patient endorses anxiety and denies SI/HI, AH/VH, and depression. Patient is med compliant and cooperative with treatment and care. Pleasant and bright on approach. Patient reports sleeping well. Appetite is good.

## 2023-08-06 NOTE — NURSING NOTE
Patient's roommate called this writer into the room because the patient was having "seziure like" activity. On assessment, the patient was on the bed, curled up into fetal position, shaking, and stuttering." Patient alert and oriented with no LOC. Psych practitioner called to bedside to assess the patient/situation. Patient was offered and accepted Benadryl 50 MG PO. Patient did not want Benadryl or Atarax. Patient states, "Ativan is to help prevent my seizures." Patient did take the Benadryl. Will reassess.

## 2023-08-06 NOTE — PLAN OF CARE
Problem: Alteration in Thoughts and Perception  Goal: Treatment Goal: Gain control of psychotic behaviors/thinking, reduce/eliminate presenting symptoms and demonstrate improved reality functioning upon discharge  Outcome: Progressing  Goal: Verbalize thoughts and feelings  Description: Interventions:  - Promote a nonjudgmental and trusting relationship with the patient through active listening and therapeutic communication  - Assess patient's level of functioning, behavior and potential for risk  - Engage patient in 1 on 1 interactions  - Encourage patient to express fears, feelings, frustrations, and discuss symptoms    - Sophia patient to reality, help patient recognize reality-based thinking   - Administer medications as ordered and assess for potential side effects  - Provide the patient education related to the signs and symptoms of the illness and desired effects of prescribed medications  Outcome: Progressing  Goal: Refrain from acting on delusional thinking/internal stimuli  Description: Interventions:  - Monitor patient closely, per order   - Utilize least restrictive measures   - Set reasonable limits, give positive feedback for acceptable   - Administer medications as ordered and monitor of potential side effects  Outcome: Progressing  Goal: Agree to be compliant with medication regime, as prescribed and report medication side effects  Description: Interventions:  - Offer appropriate PRN medication and supervise ingestion; conduct AIMS, as needed   Outcome: Progressing  Goal: Recognize dysfunctional thoughts, communicate reality-based thoughts at the time of discharge  Description: Interventions:  - Provide medication and psycho-education to assist patient in compliance and developing insight into his/her illness   Outcome: Progressing  Goal: Complete daily ADLs, including personal hygiene independently, as able  Description: Interventions:  - Observe, teach, and assist patient with ADLS  - Monitor and promote a balance of rest/activity, with adequate nutrition and elimination   Outcome: Progressing     Problem: Ineffective Coping  Goal: Identifies ineffective coping skills  Outcome: Progressing  Goal: Identifies healthy coping skills  Outcome: Progressing  Goal: Demonstrates healthy coping skills  Outcome: Progressing  Goal: Understands least restrictive measures  Description: Interventions:  - Utilize least restrictive behavior  Outcome: Progressing  Goal: Free from restraint events  Description: - Utilize least restrictive measures   - Provide behavioral interventions   - Redirect inappropriate behaviors   Outcome: Progressing     Problem: Risk for Self Injury/Neglect  Goal: Treatment Goal: Remain safe during length of stay, learn and adopt new coping skills, and be free of self-injurious ideation, impulses and acts at the time of discharge  Outcome: Progressing  Goal: Verbalize thoughts and feelings  Description: Interventions:  - Assess and re-assess patient's lethality and potential for self-injury  - Engage patient in 1:1 interactions, daily, for a minimum of 15 minutes  - Encourage patient to express feelings, fears, frustrations, hopes  - Establish rapport/trust with patient   Outcome: Progressing  Goal: Refrain from harming self  Description: Interventions:  - Monitor patient closely, per order  - Develop a trusting relationship  - Supervise medication ingestion, monitor effects and side effects   Outcome: Progressing  Goal: Recognize maladaptive responses and adopt new coping mechanisms  Outcome: Progressing  Goal: Complete daily ADLs, including personal hygiene independently, as able  Description: Interventions:  - Observe, teach, and assist patient with ADLS  - Monitor and promote a balance of rest/activity, with adequate nutrition and elimination  Outcome: Progressing     Problem: Depression  Goal: Treatment Goal: Demonstrate behavioral control of depressive symptoms, verbalize feelings of improved mood/affect, and adopt new coping skills prior to discharge  Outcome: Progressing  Goal: Verbalize thoughts and feelings  Description: Interventions:  - Assess and re-assess patient's level of risk   - Engage patient in 1:1 interactions, daily, for a minimum of 15 minutes   - Encourage patient to express feelings, fears, frustrations, hopes   Outcome: Progressing  Goal: Refrain from harming self  Description: Interventions:  - Monitor patient closely, per order   - Supervise medication ingestion, monitor effects and side effects   Outcome: Progressing  Goal: Refrain from isolation  Description: Interventions:  - Develop a trusting relationship   - Encourage socialization   Outcome: Progressing  Goal: Refrain from self-neglect  Outcome: Progressing  Goal: Complete daily ADLs, including personal hygiene independently, as able  Description: Interventions:  - Observe, teach, and assist patient with ADLS  -  Monitor and promote a balance of rest/activity, with adequate nutrition and elimination   Outcome: Progressing     Problem: Anxiety  Goal: Anxiety is at manageable level  Description: Interventions:  - Assess and monitor patient's anxiety level. - Monitor for signs and symptoms (heart palpitations, chest pain, shortness of breath, headaches, nausea, feeling jumpy, restlessness, irritable, apprehensive). - Collaborate with interdisciplinary team and initiate plan and interventions as ordered.   - [place] patient to unit/surroundings  - Explain treatment plan  - Encourage participation in care  - Encourage verbalization of concerns/fears  - Identify coping mechanisms  - Assist in developing anxiety-reducing skills  - Administer/offer alternative therapies  - Limit or eliminate stimulants  Outcome: Progressing     Problem: Risk for Violence/Aggression Toward Others  Goal: Verbalize thoughts and feelings  Description: Interventions:  - Assess and re-assess patient's level of risk, every waking shift  - Engage patient in 1:1 interactions, daily, for a minimum of 15 minutes   - Allow patient to express feelings and frustrations in a safe and non-threatening manner   - Establish rapport/trust with patient   Outcome: Progressing  Goal: Refrain from harming others  Outcome: Progressing  Goal: Refrain from destructive acts on the environment or property  Outcome: Progressing  Goal: Control angry outbursts  Description: Interventions:  - Monitor patient closely, per order  - Ensure early verbal de-escalation  - Monitor prn medication needs  - Set reasonable/therapeutic limits, outline behavioral expectations, and consequences   - Provide a non-threatening milieu, utilizing the least restrictive interventions   Outcome: Progressing  Goal: Identify appropriate positive anger management techniques  Description: Interventions:  - Offer anger management and coping skills groups   - Staff will provide positive feedback for appropriate anger control  Outcome: Progressing     Problem: Alteration in Orientation  Goal: Treatment Goal: Demonstrate a reduction of confusion and improved orientation to person, place, time and/or situation upon discharge, according to optimum baseline/ability  Outcome: Progressing  Goal: Interact with staff daily  Description: Interventions:  - Assess and re-assess patient's level of orientation  - Engage patient in 1 on 1 interactions, daily, for a minimum of 15 minutes   - Establish rapport/trust with patient   Outcome: Progressing  Goal: Express concerns related to confused thinking related to:  Description: Interventions:  - Encourage patient to express feelings, fears, frustrations, hopes  - Assign consistent caregivers   - [place]/re-orient patient as needed  - Allow comfort items, as appropriate  - Provide visual cues, signs, etc.   Outcome: Progressing  Goal: Allow medical examinations, as recommended  Description: Interventions:  - Provide physical/neurological exams and/or referrals, per provider   Outcome: Progressing  Goal: Cooperate with recommended testing/procedures  Description: Interventions:  - Determine need for ancillary testing  - Observe for mental status changes  - Implement falls/precaution protocol   Outcome: Progressing  Goal: Complete daily ADLs, including personal hygiene independently, as able  Description: Interventions:  - Observe, teach, and assist patient with ADLS  Outcome: Progressing

## 2023-08-06 NOTE — TREATMENT TEAM
08/05/23 2130   Service   Service SLIM   Provider Name Mariano   Multi-disciplinary Rounds   Diagnosis  Reviewed   Patient c/o itchiness and hives from receiving Tylenol the night prior that has not been relieved by taking Atarax throughout the day. Provider made aware and PRN Benadryl 50 mg PO ordered and aministered for itching and hives at 2134.

## 2023-08-06 NOTE — NURSING NOTE
Patient reports no relief after taking Atarax 50 MG PO. Patient states, "Atarax doesn't work at all." Psychiatrist aware.

## 2023-08-06 NOTE — TREATMENT TEAM
08/06/23 0741   Martin Anxiety Scale   Anxious Mood 3   Tension 3   Fears 2   Insomnia 2   Intellectual 2   Depressed Mood 3   Somatic Complaints: Muscular 0   Somatic Complaints: Sensory 2   Cardiovascular Symptoms 0   Respiratory Symptoms 0   Gastrointestinal Symptoms 0   Genitourinary Symptoms 0   Autonomic Symptoms 2   Behavior at Interview 3   Martin Anxiety Score 22     Patient offered and accepted Atarax 50 MG PO for moderate anxiety. Will reassess.

## 2023-08-06 NOTE — NURSING NOTE
Patient states the PRN Benadryl was effective. Currently resting in bed with no outward signs of distress.

## 2023-08-06 NOTE — NURSING NOTE
Patient reports that Benadryl 50 MG PO "did not help at all" to calm her down. Patient is observed sitting in the day room, coloring, and talking to peers.

## 2023-08-06 NOTE — PROGRESS NOTES
Progress Note - Behavioral Health   Tyrone Milan 46 y.o. female MRN: [id number]  Unit/Bed#: [facility] 150-29 Encounter: [id number]    The patient was seen for continuing care and reviewed with treatment team.  Pt continues to request multiple PRNS, previously she reported relief from atarax and benadryl , now she says they do not work. She complains of non-specific symtpoms, shaking, itchiness around neck and remains very somatically preoccupied. She talks about needing ativan. We discussed trying lowest of 0.25mg for 2-3 days only, she verbalized understanding. Sleep-  Better last night with trazodone  Appetite - good  Energy: fair,   No suicidal or homicidal ideations. No AVH  ROS:  Negative    /57 (BP Location: Right arm)   Pulse (!) 116   Temp 97.5 °F (36.4 °C) (Temporal)   Resp 18   Ht 5' 5" (1.651 m)   Wt 65.4 kg (144 lb 2.9 oz)   SpO2 97%   BMI 23.99 kg/m²     Current Mental Status Evaluation:  Appearance:  Adequate hygiene and grooming   Behavior:  Calm, Cooperative and Pleasant   Mood:  anxious and improving   Affect: anxious   Speech: Normal volume and Normal rate   Thought Process:  Goal directed and coherent , Circumstantial at times   Thought Content:  Does not verbalize delusional material,  Concerns for allergies, mast cell activation   Perceptual Disturbances: Denies hallucinations and does not appear to be responding to internal stimuli,    Risk Potential: No suicidal or homicidal ideation   Orientation:   Patient is alert, awake and oriented x 3   Patient has fair insight into her illness, judgment and impulse control are Improving    No results found for this or any previous visit (from the past 72 hour(s)). Progress Toward Goals: regressing, Medication seeking, somatically preoccupied. But tolerating higher sertraline dose.      Assessment     Principal Problem:    Major depressive disorder with psychotic features [facility])  Active Problems:    Hypokalemia    Headache    Generalized anxiety disorder    Acquired hypothyroidism    Leukocytosis    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HCC)    Crohn's colitis (HCC)    Seizure ([address])    Bladder tumor    Encephalopathy chronic    Medical clearance for psychiatric admission    Seasonal allergies    GERD without esophagitis    Benzodiazepine misuse        Plan :    - Medications;   Psychiatric:   Sertraline to 125mg daily   Abilify 10mg daily for psychosis.   Trazodone 50mg HS for sleep   Try ativan 0.25 daily for 2-3 days     Medical: per SLIM    -Therapy: occupational therapy, milieu and group therapy  - Legal: 201   -Disposition: Home when stable

## 2023-08-07 PROCEDURE — 99232 SBSQ HOSP IP/OBS MODERATE 35: CPT | Performed by: STUDENT IN AN ORGANIZED HEALTH CARE EDUCATION/TRAINING PROGRAM

## 2023-08-07 RX ORDER — BISACODYL 5 MG/1
5 TABLET, DELAYED RELEASE ORAL DAILY PRN
Status: DISCONTINUED | OUTPATIENT
Start: 2023-08-07 | End: 2023-08-15 | Stop reason: HOSPADM

## 2023-08-07 RX ORDER — LORAZEPAM 0.5 MG/1
0.25 TABLET ORAL 2 TIMES DAILY PRN
Status: DISCONTINUED | OUTPATIENT
Start: 2023-08-07 | End: 2023-08-15 | Stop reason: HOSPADM

## 2023-08-07 RX ADMIN — LORAZEPAM 0.5 MG: 0.5 TABLET ORAL at 02:22

## 2023-08-07 RX ADMIN — LEVOTHYROXINE SODIUM 100 MCG: 100 TABLET ORAL at 06:46

## 2023-08-07 RX ADMIN — DIPHENHYDRAMINE HYDROCHLORIDE 50 MG: 25 TABLET ORAL at 13:32

## 2023-08-07 RX ADMIN — CHOLECALCIFEROL TAB 25 MCG (1000 UNIT) 1000 UNITS: 25 TAB at 08:16

## 2023-08-07 RX ADMIN — SERTRALINE HYDROCHLORIDE 125 MG: 100 TABLET ORAL at 08:16

## 2023-08-07 RX ADMIN — BISACODYL 5 MG: 5 TABLET, COATED ORAL at 10:42

## 2023-08-07 RX ADMIN — PANTOPRAZOLE SODIUM 40 MG: 40 TABLET, DELAYED RELEASE ORAL at 06:46

## 2023-08-07 RX ADMIN — LIOTHYRONINE SODIUM 5 MCG: 5 TABLET ORAL at 08:18

## 2023-08-07 RX ADMIN — TRAZODONE HYDROCHLORIDE 50 MG: 50 TABLET ORAL at 21:21

## 2023-08-07 RX ADMIN — ARIPIPRAZOLE 10 MG: 10 TABLET ORAL at 08:16

## 2023-08-07 RX ADMIN — CETIRIZINE HYDROCHLORIDE 20 MG: 10 TABLET, FILM COATED ORAL at 08:17

## 2023-08-07 RX ADMIN — LORAZEPAM 0.25 MG: 0.5 TABLET ORAL at 08:18

## 2023-08-07 RX ADMIN — FAMOTIDINE 40 MG: 20 TABLET ORAL at 08:16

## 2023-08-07 RX ADMIN — PREDNISONE 20 MG: 20 TABLET ORAL at 08:16

## 2023-08-07 RX ADMIN — MONTELUKAST 10 MG: 10 TABLET, FILM COATED ORAL at 21:21

## 2023-08-07 NOTE — PROGRESS NOTES
08/07/23 0810   Team Meeting   Meeting Type Daily Rounds   Initial Conference Date 08/07/23   Next Conference Date 08/08/23   Team Members Present   Team Members Present Physician;Nurse;;; Occupational Therapist   Physician Team Member Dr Christina Ryder Team Member [facility] Management Team Member [address] [place] [address] [email or web address] Work Team Member Jusytna Cotton   Patient/Family Present   Patient Present No   Patient's Family Present No     Asked for ativan PO last night saying she was nervous, refused atarax and benadryl and that they are not doing anything for her.  Seizure like activity, rapid was called on Saturday around 12 pm. Not discharging her on ativan, she is on a low dose 0.25 here

## 2023-08-07 NOTE — PLAN OF CARE
Problem: Alteration in Thoughts and Perception  Goal: Treatment Goal: Gain control of psychotic behaviors/thinking, reduce/eliminate presenting symptoms and demonstrate improved reality functioning upon discharge  Outcome: Progressing  Goal: Verbalize thoughts and feelings  Description: Interventions:  - Promote a nonjudgmental and trusting relationship with the patient through active listening and therapeutic communication  - Assess patient's level of functioning, behavior and potential for risk  - Engage patient in 1 on 1 interactions  - Encourage patient to express fears, feelings, frustrations, and discuss symptoms    - [place] patient to reality, help patient recognize reality-based thinking   - Administer medications as ordered and assess for potential side effects  - Provide the patient education related to the signs and symptoms of the illness and desired effects of prescribed medications  Outcome: Progressing  Goal: Refrain from acting on delusional thinking/internal stimuli  Description: Interventions:  - Monitor patient closely, per order   - Utilize least restrictive measures   - Set reasonable limits, give positive feedback for acceptable   - Administer medications as ordered and monitor of potential side effects  Outcome: Progressing  Goal: Agree to be compliant with medication regime, as prescribed and report medication side effects  Description: Interventions:  - Offer appropriate PRN medication and supervise ingestion; conduct AIMS, as needed   Outcome: Progressing  Goal: Attend and participate in unit activities, including therapeutic, recreational, and educational groups  Description: Interventions:  -Encourage Visitation and family involvement in care  Outcome: Progressing  Goal: Recognize dysfunctional thoughts, communicate reality-based thoughts at the time of discharge  Description: Interventions:  - Provide medication and psycho-education to assist patient in compliance and developing insight into his/her illness   Outcome: Progressing  Goal: Complete daily ADLs, including personal hygiene independently, as able  Description: Interventions:  - Observe, teach, and assist patient with ADLS  - Monitor and promote a balance of rest/activity, with adequate nutrition and elimination   Outcome: Progressing

## 2023-08-07 NOTE — PROGRESS NOTES
Pt attended [facility] [place] Recovery: my self care plan group. Pt anxious and able to self reflect. Pt noted poor sleep. Pt indicated she has Crohn's disease and light sleeper due to 2 kids medical needs and taking care. Pt expressed that she needs to learn alternate coping  skills rather than solely taking meds for sleep. 08/07/23 1100   Activity/Group Checklist   Group Other (Comment)  ( Recovery: my self care plan)   Attendance Attended   Attendance Duration (min) 31-45   Interactions Interacted appropriately   Affect/Mood Other (Comment)  (anxious)   Goals Achieved Identified feelings; Identified triggers; Identified relapse prevention strategies; Discussed coping strategies; Able to listen to others; Able to engage in interactions; Able to reflect/comment on own behavior;Verbalized increased hopefulness; Able to manage/cope with feelings; Able to self-disclose; Able to recieve feedback

## 2023-08-07 NOTE — NURSING NOTE
Pt reports to Nursing station claiming she is unable to sleep even though she was seen sleeping a few minutes ago. Pt continues to be somatic and refuses Atarax and benadryl claiming "They don't do anything" Pt claims she does not want Ativan but it is the only medication that works for her. During the conversation the Pt becomes increasingly anxious. Offers of benadryl and Atarax are refused. Ativan given as ordered. Pt returned to room.

## 2023-08-07 NOTE — NURSING NOTE
Patient approaching nurses station stating she's itchy because of all the perfumes people are wearing in the dayroom. Observed scratching neck and chest, however, no hives and/or SOB evident. Requested and received PRN Benadryl @ 1332 which appears effective as patient isn't scratching or c/o itchiness anymore. Safety checks ongoing.

## 2023-08-07 NOTE — NURSING NOTE
Patient is visible in the milieu, social with peers. Pleasant on approach, remains preoccupied with receiving PRN's. Compliant with meals and scheduled medications. Reports anxiety, denies depression, SI/HI/AH/VH. Requested and received PRN Ducolax 5 mg @ 9785. Safety checks ongoing.

## 2023-08-07 NOTE — PROGRESS NOTES
Progress Note - Behavioral Health   Jailyn Jara 46 y.o. female MRN: [id number]  Unit/Bed#: [facility] 445-34 Encounter: [id number]    The patient was seen for continuing care and reviewed with treatment team.     Patient is seen out coloring today, she continues to report somatic concerns, has multiple question about allergic symptoms. Says she was offers ativan PRN because the RN thought that was the best step to take. She has been declining atarax, benadryl recently believing that they don't help anymore. Sleep-  Better last night with trazodone  Appetite - good  Energy: fair. No suicidal or homicidal ideations. No AVH  ROS:   Constipation, requested for dulcolax 5mg which she tolerates well. Reports allergy to Miralax. /86 (BP Location: Left arm)   Pulse (!) 112   Temp (!) 97.4 °F (36.3 °C) (Temporal)   Resp 18   Ht 5' 5" (1.651 m)   Wt 65.4 kg (144 lb 2.9 oz)   SpO2 99%   BMI 23.99 kg/m²     Current Mental Status Evaluation:  Appearance:  Adequate hygiene and grooming   Behavior:  Calm, Cooperative and Pleasant   Mood:  anxious   Affect: anxious   Speech: Normal volume and Normal rate   Thought Process:  Goal directed and coherent ,    Thought Content:  Does not verbalize delusional material,  Somatic preoccupations, PRN  benzoadiazepine   Perceptual Disturbances: Denies hallucinations and does not appear to be responding to internal stimuli,    Risk Potential: No suicidal or homicidal ideation   Orientation:   Patient is alert, awake and oriented x 3   Patient has fair insight into her illness, judgment and impulse control are Improving    No results found for this or any previous visit (from the past 72 hour(s)). Progress Toward Goals: regressing, Medication seeking, somatically preoccupied. Sertraline recently adjusted , will continue to monitor.      Assessment     Principal Problem:    Major depressive disorder with psychotic features ([address])  Active Problems:    Hypokalemia Headache    Generalized anxiety disorder    Acquired hypothyroidism    Leukocytosis    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HCC)    Crohn's colitis (HCC)    Seizure (HCC)    Bladder tumor    Encephalopathy chronic    Medical clearance for psychiatric admission    Seasonal allergies    GERD without esophagitis    Benzodiazepine misuse        Plan :    - Medications;   Psychiatric:   Sertraline 125mg daily   Abilify 10mg daily for psychosis.   Trazodone 50mg HS for sleep  Ativan 0.25 daily for 2-3 days     Medical: per SLIM    -Therapy: occupational therapy, milieu and group therapy  - Legal: 201   -Disposition: Home when stable

## 2023-08-07 NOTE — CMS CERTIFICATION NOTE
Certification: Based upon physical, mental and social evaluations, I certify that inpatient psychiatric services are medically necessary for this patient for a duration of 30 midnights for the treatment of MDD severe with psychosis.

## 2023-08-08 LAB
ALBUMIN SERPL BCP-MCNC: 3.8 G/DL (ref 3.5–5)
ALP SERPL-CCNC: 40 U/L (ref 34–104)
ALT SERPL W P-5'-P-CCNC: 43 U/L (ref 7–52)
ANION GAP SERPL CALCULATED.3IONS-SCNC: 9 MMOL/L
AST SERPL W P-5'-P-CCNC: 18 U/L (ref 13–39)
BASOPHILS # BLD AUTO: 0.11 THOUSANDS/ÂΜL (ref 0–0.1)
BASOPHILS NFR BLD AUTO: 1 % (ref 0–1)
BILIRUB SERPL-MCNC: 0.34 MG/DL (ref 0.2–1)
BUN SERPL-MCNC: 11 MG/DL (ref 5–25)
CALCIUM SERPL-MCNC: 9.2 MG/DL (ref 8.4–10.2)
CHLORIDE SERPL-SCNC: 102 MMOL/L (ref 96–108)
CHOLEST SERPL-MCNC: 176 MG/DL
CO2 SERPL-SCNC: 29 MMOL/L (ref 21–32)
CREAT SERPL-MCNC: 0.67 MG/DL (ref 0.6–1.3)
EOSINOPHIL # BLD AUTO: 0.21 THOUSAND/ÂΜL (ref 0–0.61)
EOSINOPHIL NFR BLD AUTO: 2 % (ref 0–6)
ERYTHROCYTE [DISTWIDTH] IN BLOOD BY AUTOMATED COUNT: 15.5 % (ref 11.6–15.1)
EST. AVERAGE GLUCOSE BLD GHB EST-MCNC: 123 MG/DL
GFR SERPL CREATININE-BSD FRML MDRD: 102 ML/MIN/1.73SQ M
GLUCOSE P FAST SERPL-MCNC: 91 MG/DL (ref 65–99)
GLUCOSE SERPL-MCNC: 91 MG/DL (ref 65–140)
HBA1C MFR BLD: 5.9 %
HCT VFR BLD AUTO: 43.8 % (ref 34.8–46.1)
HDLC SERPL-MCNC: 59 MG/DL
HGB BLD-MCNC: 14.2 G/DL (ref 11.5–15.4)
IMM GRANULOCYTES # BLD AUTO: 0.04 THOUSAND/UL (ref 0–0.2)
IMM GRANULOCYTES NFR BLD AUTO: 0 % (ref 0–2)
LDLC SERPL CALC-MCNC: 87 MG/DL (ref 0–100)
LYMPHOCYTES # BLD AUTO: 3.5 THOUSANDS/ÂΜL (ref 0.6–4.47)
LYMPHOCYTES NFR BLD AUTO: 29 % (ref 14–44)
MCH RBC QN AUTO: 30.5 PG (ref 26.8–34.3)
MCHC RBC AUTO-ENTMCNC: 32.4 G/DL (ref 31.4–37.4)
MCV RBC AUTO: 94 FL (ref 82–98)
MONOCYTES # BLD AUTO: 0.97 THOUSAND/ÂΜL (ref 0.17–1.22)
MONOCYTES NFR BLD AUTO: 8 % (ref 4–12)
NEUTROPHILS # BLD AUTO: 7.08 THOUSANDS/ÂΜL (ref 1.85–7.62)
NEUTS SEG NFR BLD AUTO: 60 % (ref 43–75)
NONHDLC SERPL-MCNC: 117 MG/DL
NRBC BLD AUTO-RTO: 0 /100 WBCS
PLATELET # BLD AUTO: 345 THOUSANDS/UL (ref 149–390)
PMV BLD AUTO: 8.9 FL (ref 8.9–12.7)
POTASSIUM SERPL-SCNC: 4 MMOL/L (ref 3.5–5.3)
PROT SERPL-MCNC: 6.1 G/DL (ref 6.4–8.4)
RBC # BLD AUTO: 4.66 MILLION/UL (ref 3.81–5.12)
SODIUM SERPL-SCNC: 140 MMOL/L (ref 135–147)
TRIGL SERPL-MCNC: 148 MG/DL
WBC # BLD AUTO: 11.91 THOUSAND/UL (ref 4.31–10.16)

## 2023-08-08 PROCEDURE — 99232 SBSQ HOSP IP/OBS MODERATE 35: CPT | Performed by: STUDENT IN AN ORGANIZED HEALTH CARE EDUCATION/TRAINING PROGRAM

## 2023-08-08 PROCEDURE — 85025 COMPLETE CBC W/AUTO DIFF WBC: CPT | Performed by: STUDENT IN AN ORGANIZED HEALTH CARE EDUCATION/TRAINING PROGRAM

## 2023-08-08 PROCEDURE — 80061 LIPID PANEL: CPT | Performed by: STUDENT IN AN ORGANIZED HEALTH CARE EDUCATION/TRAINING PROGRAM

## 2023-08-08 PROCEDURE — 83036 HEMOGLOBIN GLYCOSYLATED A1C: CPT | Performed by: STUDENT IN AN ORGANIZED HEALTH CARE EDUCATION/TRAINING PROGRAM

## 2023-08-08 PROCEDURE — 86780 TREPONEMA PALLIDUM: CPT | Performed by: STUDENT IN AN ORGANIZED HEALTH CARE EDUCATION/TRAINING PROGRAM

## 2023-08-08 PROCEDURE — 80053 COMPREHEN METABOLIC PANEL: CPT | Performed by: STUDENT IN AN ORGANIZED HEALTH CARE EDUCATION/TRAINING PROGRAM

## 2023-08-08 RX ADMIN — FAMOTIDINE 40 MG: 20 TABLET ORAL at 08:33

## 2023-08-08 RX ADMIN — ARIPIPRAZOLE 10 MG: 10 TABLET ORAL at 08:32

## 2023-08-08 RX ADMIN — CETIRIZINE HYDROCHLORIDE 20 MG: 10 TABLET, FILM COATED ORAL at 08:37

## 2023-08-08 RX ADMIN — CHOLECALCIFEROL TAB 25 MCG (1000 UNIT) 1000 UNITS: 25 TAB at 08:33

## 2023-08-08 RX ADMIN — TRAZODONE HYDROCHLORIDE 50 MG: 50 TABLET ORAL at 21:11

## 2023-08-08 RX ADMIN — PREDNISONE 20 MG: 20 TABLET ORAL at 08:33

## 2023-08-08 RX ADMIN — DIPHENHYDRAMINE HYDROCHLORIDE 50 MG: 25 TABLET ORAL at 20:40

## 2023-08-08 RX ADMIN — MONTELUKAST 10 MG: 10 TABLET, FILM COATED ORAL at 21:11

## 2023-08-08 RX ADMIN — PANTOPRAZOLE SODIUM 40 MG: 40 TABLET, DELAYED RELEASE ORAL at 06:27

## 2023-08-08 RX ADMIN — LORAZEPAM 0.25 MG: 0.5 TABLET ORAL at 08:36

## 2023-08-08 RX ADMIN — LEVOTHYROXINE SODIUM 100 MCG: 100 TABLET ORAL at 06:27

## 2023-08-08 RX ADMIN — LIOTHYRONINE SODIUM 5 MCG: 5 TABLET ORAL at 08:37

## 2023-08-08 RX ADMIN — DIPHENHYDRAMINE HYDROCHLORIDE 50 MG: 25 TABLET ORAL at 11:50

## 2023-08-08 RX ADMIN — SERTRALINE HYDROCHLORIDE 125 MG: 100 TABLET ORAL at 08:32

## 2023-08-08 NOTE — NURSING NOTE
Patient in room this shift speaking to roommate. Reports having ongoing anxiety. Medication compliant. No complaints of itchiness. Reports that her PRN Dulcolax was effective but complains that "its coming out like spaghetti." Encouraged to inform staff of any additional concerns.

## 2023-08-08 NOTE — PROGRESS NOTES
Pt attended [facility] esteem and strengths group. Pt tearful and noted that she is kind to everyone. Pt did acknowledge that she needs to focus on her self care and being kind to herself. Pt roommate did note that she asks the patient for things/talks and pt not able to set boundaries. 08/08/23 1100   Activity/Group Checklist   Group Other (Comment)  (Self esteem and strengths group)   Attendance Attended   Attendance Duration (min) 31-45   Interactions Other (Comment)  (tearful)   Affect/Mood Blunted/flat   Goals Achieved Identified feelings; Identified relapse prevention strategies; Discussed self-esteem issues; Discussed coping strategies; Verbalized increased hopefulness; Able to manage/cope with feelings; Able to reflect/comment on own behavior;Able to engage in interactions; Able to listen to others

## 2023-08-08 NOTE — NURSING NOTE
Patient is pleasant and cooperative with the care and medications. PRN Benadryl given to patient at 11.50 a.m. Patient came to nurses station and was c/o of itchy skin on her chest, feeling hot, tingling through. New order for ice pack . Patient reported symptoms were improving. After PRN. Patient denies SI,HI.

## 2023-08-08 NOTE — PROGRESS NOTES
08/08/23 0745   Team Meeting   Meeting Type Daily Rounds   Initial Conference Date 08/08/23   Next Conference Date 08/09/23   Team Members Present   Team Members Present Physician;Nurse;;Occupational Therapist;   Physician Team Member Dr Indigo Sanchez Team Member [address] Management Team Member [address] [facility] Work Team Member Matt Denton   Patient/Family Present   Patient Present No   Patient's Family Present No     Somatic, benadryl [id number], refused ativan but it was documented that she took it a couple days ago and she is focused on that.

## 2023-08-08 NOTE — PROGRESS NOTES
Progress Note - Behavioral Health   Emeli Cervantes 46 y.o. female MRN: [id number]  Unit/Bed#: [facility] 650-31 Encounter: [id number]    The patient was seen for continuing care and reviewed with treatment team.  Patient is calm and pleasant , more engaged in treatment plan, visible and busy. She reports journaling helps her cope. She continues to express concerns about her K, blood work, allergies but responded well to reassurance. Sleep-  Better last night with trazodone 50mg. Appetite - good  Energy: fair. No suicidal or homicidal ideations. No AVH  ROS:    None reported. Take PRN medications.  Benadryl was taken for itchiness    /90 (BP Location: Right arm)   Pulse (!) 118   Temp 97.6 °F (36.4 °C) (Temporal)   Resp 17   Ht 5' 5" (1.651 m)   Wt 66.4 kg (146 lb 6.4 oz)   SpO2 96%   BMI 24.36 kg/m²     Current Mental Status Evaluation:  Appearance:  Adequate hygiene and grooming   Behavior:  Calm, Cooperative and Pleasant   Mood:  anxious   Affect: anxious   Speech: Normal volume and Normal rate   Thought Process:  Goal directed and coherent ,    Thought Content:  Does not verbalize delusional material,  Somatic preoccupations,    Perceptual Disturbances: Denies hallucinations and does not appear to be responding to internal stimuli,    Risk Potential: No suicidal or homicidal ideation   Orientation:   Patient is alert, awake and oriented x 3   Patient has fair insight into her illness, judgment and impulse control are Improving    Recent Results (from the past 72 hour(s))   Comprehensive metabolic panel    Collection Time: 08/08/23  6:44 AM   Result Value Ref Range    Sodium 140 135 - 147 mmol/L    Potassium 4.0 3.5 - 5.3 mmol/L    Chloride 102 96 - 108 mmol/L    CO2 29 21 - 32 mmol/L    ANION GAP 9 mmol/L    BUN 11 5 - 25 mg/dL    Creatinine 0.67 0.60 - 1.30 mg/dL    Glucose 91 65 - 140 mg/dL    Glucose, Fasting 91 65 - 99 mg/dL    Calcium 9.2 8.4 - 10.2 mg/dL    AST 18 13 - 39 U/L    ALT 43 7 - 52 U/L    Alkaline Phosphatase 40 34 - 104 U/L    Total Protein 6.1 (L) 6.4 - 8.4 g/dL    Albumin 3.8 3.5 - 5.0 g/dL    Total Bilirubin 0.34 0.20 - 1.00 mg/dL    eGFR 102 ml/min/1.73sq m   CBC and differential    Collection Time: 08/08/23  6:44 AM   Result Value Ref Range    WBC 11.91 (H) 4.31 - 10.16 Thousand/uL    RBC 4.66 3.81 - 5.12 Million/uL    Hemoglobin 14.2 11.5 - 15.4 g/dL    Hematocrit 43.8 34.8 - 46.1 %    MCV 94 82 - 98 fL    MCH 30.5 26.8 - 34.3 pg    MCHC 32.4 31.4 - 37.4 g/dL    RDW 15.5 (H) 11.6 - 15.1 %    MPV 8.9 8.9 - 12.7 fL    Platelets 937 563 - 806 Thousands/uL    nRBC 0 /100 WBCs    Neutrophils Relative 60 43 - 75 %    Immat GRANS % 0 0 - 2 %    Lymphocytes Relative 29 14 - 44 %    Monocytes Relative 8 4 - 12 %    Eosinophils Relative 2 0 - 6 %    Basophils Relative 1 0 - 1 %    Neutrophils Absolute 7.08 1.85 - 7.62 Thousands/µL    Immature Grans Absolute 0.04 0.00 - 0.20 Thousand/uL    Lymphocytes Absolute 3.50 0.60 - 4.47 Thousands/µL    Monocytes Absolute 0.97 0.17 - 1.22 Thousand/µL    Eosinophils Absolute 0.21 0.00 - 0.61 Thousand/µL    Basophils Absolute 0.11 (H) 0.00 - 0.10 Thousands/µL   Lipid panel    Collection Time: 08/08/23  6:44 AM   Result Value Ref Range    Cholesterol 176 See Comment mg/dL    Triglycerides 148 See Comment mg/dL    HDL, Direct 59 >=50 mg/dL    LDL Calculated 87 0 - 100 mg/dL    Non-HDL-Chol (CHOL-HDL) 117 mg/dl       Progress Toward Goals: regressing, Medication seeking, somatically preoccupied. Sertraline recently adjusted , will continue to monitor.      Assessment     Principal Problem:    Major depressive disorder with psychotic features (HCC)  Active Problems:    Hypokalemia    Headache    Generalized anxiety disorder    Acquired hypothyroidism    Leukocytosis    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HCC)    Crohn's colitis (HCC)    Seizure (HCC)    Bladder tumor    Encephalopathy chronic    Medical clearance for psychiatric admission    Seasonal allergies    GERD without esophagitis    Benzodiazepine misuse        Plan :    - Medications;   Psychiatric:   Increase Sertraline 150mg daily   Abilify 10mg daily for psychosis.   Trazodone 50mg HS for sleep  Ativan 0.25 daily (day 2)     Medical: per SLIM    -Therapy: occupational therapy, milieu and group therapy  - Legal: 201   -Disposition: Home when stable

## 2023-08-09 LAB — TREPONEMA PALLIDUM IGG+IGM AB [PRESENCE] IN SERUM OR PLASMA BY IMMUNOASSAY: NORMAL

## 2023-08-09 PROCEDURE — 99232 SBSQ HOSP IP/OBS MODERATE 35: CPT | Performed by: STUDENT IN AN ORGANIZED HEALTH CARE EDUCATION/TRAINING PROGRAM

## 2023-08-09 RX ADMIN — LIOTHYRONINE SODIUM 5 MCG: 5 TABLET ORAL at 08:32

## 2023-08-09 RX ADMIN — LEVOTHYROXINE SODIUM 100 MCG: 100 TABLET ORAL at 06:32

## 2023-08-09 RX ADMIN — TRAZODONE HYDROCHLORIDE 50 MG: 50 TABLET ORAL at 21:11

## 2023-08-09 RX ADMIN — PREDNISONE 20 MG: 20 TABLET ORAL at 08:31

## 2023-08-09 RX ADMIN — CETIRIZINE HYDROCHLORIDE 20 MG: 10 TABLET, FILM COATED ORAL at 08:32

## 2023-08-09 RX ADMIN — LORAZEPAM 0.25 MG: 0.5 TABLET ORAL at 08:34

## 2023-08-09 RX ADMIN — PANTOPRAZOLE SODIUM 40 MG: 40 TABLET, DELAYED RELEASE ORAL at 06:32

## 2023-08-09 RX ADMIN — FAMOTIDINE 40 MG: 20 TABLET ORAL at 08:30

## 2023-08-09 RX ADMIN — ARIPIPRAZOLE 10 MG: 10 TABLET ORAL at 08:30

## 2023-08-09 RX ADMIN — MONTELUKAST 10 MG: 10 TABLET, FILM COATED ORAL at 21:10

## 2023-08-09 RX ADMIN — CHOLECALCIFEROL TAB 25 MCG (1000 UNIT) 1000 UNITS: 25 TAB at 08:30

## 2023-08-09 RX ADMIN — DIPHENHYDRAMINE HYDROCHLORIDE 50 MG: 25 TABLET ORAL at 19:54

## 2023-08-09 RX ADMIN — SERTRALINE HYDROCHLORIDE 150 MG: 100 TABLET ORAL at 08:31

## 2023-08-09 NOTE — PLAN OF CARE
Problem: Alteration in Thoughts and Perception  Goal: Treatment Goal: Gain control of psychotic behaviors/thinking, reduce/eliminate presenting symptoms and demonstrate improved reality functioning upon discharge  Outcome: Progressing  Goal: Verbalize thoughts and feelings  Description: Interventions:  - Promote a nonjudgmental and trusting relationship with the patient through active listening and therapeutic communication  - Assess patient's level of functioning, behavior and potential for risk  - Engage patient in 1 on 1 interactions  - Encourage patient to express fears, feelings, frustrations, and discuss symptoms    - [place] patient to reality, help patient recognize reality-based thinking   - Administer medications as ordered and assess for potential side effects  - Provide the patient education related to the signs and symptoms of the illness and desired effects of prescribed medications  Outcome: Progressing  Goal: Refrain from acting on delusional thinking/internal stimuli  Description: Interventions:  - Monitor patient closely, per order   - Utilize least restrictive measures   - Set reasonable limits, give positive feedback for acceptable   - Administer medications as ordered and monitor of potential side effects  Outcome: Progressing  Goal: Agree to be compliant with medication regime, as prescribed and report medication side effects  Description: Interventions:  - Offer appropriate PRN medication and supervise ingestion; conduct AIMS, as needed   Outcome: Progressing  Goal: Recognize dysfunctional thoughts, communicate reality-based thoughts at the time of discharge  Description: Interventions:  - Provide medication and psycho-education to assist patient in compliance and developing insight into his/her illness   Outcome: Progressing  Goal: Complete daily ADLs, including personal hygiene independently, as able  Description: Interventions:  - Observe, teach, and assist patient with ADLS  - Monitor and promote a balance of rest/activity, with adequate nutrition and elimination   Outcome: Progressing     Problem: Ineffective Coping  Goal: Identifies ineffective coping skills  Outcome: Progressing  Goal: Identifies healthy coping skills  Outcome: Progressing  Goal: Demonstrates healthy coping skills  Outcome: Progressing    Goal: Patient/Family participate in treatment and DC plans  Description: Interventions:  - Provide therapeutic environment  Outcome: Progressing  Goal: Patient/Family verbalizes awareness of resources  Outcome: Progressing     Problem: Risk for Self Injury/Neglect  Goal: Treatment Goal: Remain safe during length of stay, learn and adopt new coping skills, and be free of self-injurious ideation, impulses and acts at the time of discharge  Outcome: Progressing  Goal: Verbalize thoughts and feelings  Description: Interventions:  - Assess and re-assess patient's lethality and potential for self-injury  - Engage patient in 1:1 interactions, daily, for a minimum of 15 minutes  - Encourage patient to express feelings, fears, frustrations, hopes  - Establish rapport/trust with patient   Outcome: Progressing  Goal: Refrain from harming self  Description: Interventions:  - Monitor patient closely, per order  - Develop a trusting relationship  - Supervise medication ingestion, monitor effects and side effects   Outcome: Progressing  Goal: Recognize maladaptive responses and adopt new coping mechanisms  Outcome: Progressing  Goal: Complete daily ADLs, including personal hygiene independently, as able  Description: Interventions:  - Observe, teach, and assist patient with ADLS  - Monitor and promote a balance of rest/activity, with adequate nutrition and elimination  Outcome: Progressing     Problem: Depression  Goal: Treatment Goal: Demonstrate behavioral control of depressive symptoms, verbalize feelings of improved mood/affect, and adopt new coping skills prior to discharge  Outcome: Progressing  Goal: Verbalize thoughts and feelings  Description: Interventions:  - Assess and re-assess patient's level of risk   - Engage patient in 1:1 interactions, daily, for a minimum of 15 minutes   - Encourage patient to express feelings, fears, frustrations, hopes   Outcome: Progressing  Goal: Refrain from harming self  Description: Interventions:  - Monitor patient closely, per order   - Supervise medication ingestion, monitor effects and side effects   Outcome: Progressing  Goal: Refrain from isolation  Description: Interventions:  - Develop a trusting relationship   - Encourage socialization   Outcome: Progressing  Goal: Refrain from self-neglect  Outcome: Progressing    Goal: Complete daily ADLs, including personal hygiene independently, as able  Description: Interventions:  - Observe, teach, and assist patient with ADLS  -  Monitor and promote a balance of rest/activity, with adequate nutrition and elimination   Outcome: Progressing     Problem: Anxiety  Goal: Anxiety is at manageable level  Description: Interventions:  - Assess and monitor patient's anxiety level. - Monitor for signs and symptoms (heart palpitations, chest pain, shortness of breath, headaches, nausea, feeling jumpy, restlessness, irritable, apprehensive). - Collaborate with interdisciplinary team and initiate plan and interventions as ordered.   - [place] patient to unit/surroundings  - Explain treatment plan  - Encourage participation in care  - Encourage verbalization of concerns/fears  - Identify coping mechanisms  - Assist in developing anxiety-reducing skills  - Administer/offer alternative therapies  - Limit or eliminate stimulants  Outcome: Progressing     Problem: Risk for Violence/Aggression Toward Others  Goal: Verbalize thoughts and feelings  Description: Interventions:  - Assess and re-assess patient's level of risk, every waking shift  - Engage patient in 1:1 interactions, daily, for a minimum of 15 minutes   - Allow patient to express feelings and frustrations in a safe and non-threatening manner   - Establish rapport/trust with patient   Outcome: Progressing  Goal: Refrain from harming others  Outcome: Progressing  Goal: Refrain from destructive acts on the environment or property  Outcome: Progressing  Goal: Control angry outbursts  Description: Interventions:  - Monitor patient closely, per order  - Ensure early verbal de-escalation  - Monitor prn medication needs  - Set reasonable/therapeutic limits, outline behavioral expectations, and consequences   - Provide a non-threatening milieu, utilizing the least restrictive interventions   Outcome: Progressing  Goal: Identify appropriate positive anger management techniques  Description: Interventions:  - Offer anger management and coping skills groups   - Staff will provide positive feedback for appropriate anger control  Outcome: Progressing     Problem: Alteration in Orientation  Goal: Treatment Goal: Demonstrate a reduction of confusion and improved orientation to person, place, time and/or situation upon discharge, according to optimum baseline/ability  Outcome: Progressing  Goal: Interact with staff daily  Description: Interventions:  - Assess and re-assess patient's level of orientation  - Engage patient in 1 on 1 interactions, daily, for a minimum of 15 minutes   - Establish rapport/trust with patient   Outcome: Progressing  Goal: Express concerns related to confused thinking related to:  Description: Interventions:  - Encourage patient to express feelings, fears, frustrations, hopes  - Assign consistent caregivers   - [place]/re-orient patient as needed  - Allow comfort items, as appropriate  - Provide visual cues, signs, etc.   Outcome: Progressing  Goal: Allow medical examinations, as recommended  Description: Interventions:  - Provide physical/neurological exams and/or referrals, per provider   Outcome: Progressing    Goal: Complete daily ADLs, including personal hygiene independently, as able  Description: Interventions:  - Observe, teach, and assist patient with ADLS  Outcome: Progressing

## 2023-08-09 NOTE — PROGRESS NOTES
08/09/23 7110   Team Meeting   Meeting Type Daily Rounds   Initial Conference Date 08/09/23   Next Conference Date 08/10/23   Team Members Present   Team Members Present Physician;Nurse;;   Physician Team Member Dr Vandana Reyes Team [facility] [id number] Work Team Member Elio Lakhani   Patient/Family Present   Patient Present No   Patient's Family Present No     benadryl 2x yesterday, still complaining of itching, family visit went well yesterday, abilify increased.  Sleeping all night,

## 2023-08-09 NOTE — NURSING NOTE
Patient visible. Med compliant. Denies depression, AH/VH/SI/HI. Endorses some anxiety but does using alternative coping mechanisms. Patient given PRN Benadryl for itching around throat and chest which she reported effective. Appetite is fair.  Continual rounding in place

## 2023-08-09 NOTE — PLAN OF CARE
Problem: Alteration in Thoughts and Perception  Goal: Treatment Goal: Gain control of psychotic behaviors/thinking, reduce/eliminate presenting symptoms and demonstrate improved reality functioning upon discharge  Outcome: Progressing  Goal: Verbalize thoughts and feelings  Description: Interventions:  - Promote a nonjudgmental and trusting relationship with the patient through active listening and therapeutic communication  - Assess patient's level of functioning, behavior and potential for risk  - Engage patient in 1 on 1 interactions  - Encourage patient to express fears, feelings, frustrations, and discuss symptoms    - [place] patient to reality, help patient recognize reality-based thinking   - Administer medications as ordered and assess for potential side effects  - Provide the patient education related to the signs and symptoms of the illness and desired effects of prescribed medications  Outcome: Progressing  Goal: Refrain from acting on delusional thinking/internal stimuli  Description: Interventions:  - Monitor patient closely, per order   - Utilize least restrictive measures   - Set reasonable limits, give positive feedback for acceptable   - Administer medications as ordered and monitor of potential side effects  Outcome: Progressing  Goal: Agree to be compliant with medication regime, as prescribed and report medication side effects  Description: Interventions:  - Offer appropriate PRN medication and supervise ingestion; conduct AIMS, as needed   Outcome: Progressing  Goal: Recognize dysfunctional thoughts, communicate reality-based thoughts at the time of discharge  Description: Interventions:  - Provide medication and psycho-education to assist patient in compliance and developing insight into his/her illness   Outcome: Progressing  Goal: Complete daily ADLs, including personal hygiene independently, as able  Description: Interventions:  - Observe, teach, and assist patient with ADLS  - Monitor and promote a balance of rest/activity, with adequate nutrition and elimination   Outcome: Progressing     Problem: Ineffective Coping  Goal: Identifies ineffective coping skills  Outcome: Progressing  Goal: Identifies healthy coping skills  Outcome: Progressing  Goal: Demonstrates healthy coping skills  Outcome: Progressing    Goal: Patient/Family participate in treatment and DC plans  Description: Interventions:  - Provide therapeutic environment  Outcome: Progressing  Goal: Patient/Family verbalizes awareness of resources  Outcome: Progressing     Problem: Risk for Self Injury/Neglect  Goal: Treatment Goal: Remain safe during length of stay, learn and adopt new coping skills, and be free of self-injurious ideation, impulses and acts at the time of discharge  Outcome: Progressing  Goal: Verbalize thoughts and feelings  Description: Interventions:  - Assess and re-assess patient's lethality and potential for self-injury  - Engage patient in 1:1 interactions, daily, for a minimum of 15 minutes  - Encourage patient to express feelings, fears, frustrations, hopes  - Establish rapport/trust with patient   Outcome: Progressing  Goal: Refrain from harming self  Description: Interventions:  - Monitor patient closely, per order  - Develop a trusting relationship  - Supervise medication ingestion, monitor effects and side effects   Outcome: Progressing    Goal: Recognize maladaptive responses and adopt new coping mechanisms  Outcome: Progressing  Goal: Complete daily ADLs, including personal hygiene independently, as able  Description: Interventions:  - Observe, teach, and assist patient with ADLS  - Monitor and promote a balance of rest/activity, with adequate nutrition and elimination  Outcome: Progressing     Problem: Depression  Goal: Treatment Goal: Demonstrate behavioral control of depressive symptoms, verbalize feelings of improved mood/affect, and adopt new coping skills prior to discharge  Outcome: Progressing  Goal: Verbalize thoughts and feelings  Description: Interventions:  - Assess and re-assess patient's level of risk   - Engage patient in 1:1 interactions, daily, for a minimum of 15 minutes   - Encourage patient to express feelings, fears, frustrations, hopes   Outcome: Progressing  Goal: Refrain from harming self  Description: Interventions:  - Monitor patient closely, per order   - Supervise medication ingestion, monitor effects and side effects   Outcome: Progressing  Goal: Refrain from isolation  Description: Interventions:  - Develop a trusting relationship   - Encourage socialization   Outcome: Progressing  Goal: Refrain from self-neglect  Outcome: Progressing  Goal: Complete daily ADLs, including personal hygiene independently, as able  Description: Interventions:  - Observe, teach, and assist patient with ADLS  -  Monitor and promote a balance of rest/activity, with adequate nutrition and elimination   Outcome: Progressing     Problem: Anxiety  Goal: Anxiety is at manageable level  Description: Interventions:  - Assess and monitor patient's anxiety level. - Monitor for signs and symptoms (heart palpitations, chest pain, shortness of breath, headaches, nausea, feeling jumpy, restlessness, irritable, apprehensive). - Collaborate with interdisciplinary team and initiate plan and interventions as ordered.   - [place] patient to unit/surroundings  - Explain treatment plan  - Encourage participation in care  - Encourage verbalization of concerns/fears  - Identify coping mechanisms  - Assist in developing anxiety-reducing skills  - Administer/offer alternative therapies  - Limit or eliminate stimulants  Outcome: Progressing     Problem: Risk for Violence/Aggression Toward Others  Goal: Verbalize thoughts and feelings  Description: Interventions:  - Assess and re-assess patient's level of risk, every waking shift  - Engage patient in 1:1 interactions, daily, for a minimum of 15 minutes   - Allow patient to express feelings and frustrations in a safe and non-threatening manner   - Establish rapport/trust with patient   Outcome: Progressing  Goal: Refrain from harming others  Outcome: Progressing  Goal: Refrain from destructive acts on the environment or property  Outcome: Progressing  Goal: Control angry outbursts  Description: Interventions:  - Monitor patient closely, per order  - Ensure early verbal de-escalation  - Monitor prn medication needs  - Set reasonable/therapeutic limits, outline behavioral expectations, and consequences   - Provide a non-threatening milieu, utilizing the least restrictive interventions   Outcome: Progressing  Goal: Identify appropriate positive anger management techniques  Description: Interventions:  - Offer anger management and coping skills groups   - Staff will provide positive feedback for appropriate anger control  Outcome: Progressing     Problem: Alteration in Orientation  Goal: Treatment Goal: Demonstrate a reduction of confusion and improved orientation to person, place, time and/or situation upon discharge, according to optimum baseline/ability  Outcome: Progressing  Goal: Interact with staff daily  Description: Interventions:  - Assess and re-assess patient's level of orientation  - Engage patient in 1 on 1 interactions, daily, for a minimum of 15 minutes   - Establish rapport/trust with patient   Outcome: Progressing  Goal: Express concerns related to confused thinking related to:  Description: Interventions:  - Encourage patient to express feelings, fears, frustrations, hopes  - Assign consistent caregivers   - [place]/re-orient patient as needed  - Allow comfort items, as appropriate  - Provide visual cues, signs, etc.   Outcome: Progressing  Goal: Allow medical examinations, as recommended  Description: Interventions:  - Provide physical/neurological exams and/or referrals, per provider   Outcome: Progressing  Goal: Cooperate with recommended testing/procedures  Description: Interventions:  - Determine need for ancillary testing  - Observe for mental status changes  - Implement falls/precaution protocol   Outcome: Progressing    Goal: Complete daily ADLs, including personal hygiene independently, as able  Description: Interventions:  - Observe, teach, and assist patient with ADLS  Outcome: Progressing

## 2023-08-09 NOTE — PROGRESS NOTES
Progress Note - Behavioral Health   Lexis Cleveland 46 y.o. female MRN: [id number]  Unit/Bed#: [facility] 545-89 Encounter: [id number]    The patient was seen for continuing care and reviewed with treatment team. She is engaged in conversations, she reports feeling better but still struggles to control her thoughts. She asked about a Possibly higher dose of Abilify to help with thoughts. She feels calmer. She tells me she took Benadryl twice yesterday because she developed a rash around her neck. Sleep- she sleeps well with the trazodone 50mg    Appetite - good  Energy: fair. No suicidal or homicidal ideations. No AVH  ROS:    None reported.        /77 (BP Location: Left arm)   Pulse (!) 117   Temp 97.5 °F (36.4 °C) (Temporal)   Resp 18   Ht 5' 5" (1.651 m)   Wt 66.4 kg (146 lb 6.4 oz)   SpO2 97%   BMI 24.36 kg/m²     Current Mental Status Evaluation:  Appearance:  Adequate hygiene and grooming   Behavior:  Calm, Cooperative and Pleasant   Mood:  anxious   Affect: anxious   Speech: Normal volume and Normal rate   Thought Process:  Goal directed and coherent ,    Thought Content:  Does not verbalize delusional material,  Somatic preoccupations,    Perceptual Disturbances: Denies hallucinations and does not appear to be responding to internal stimuli,    Risk Potential: No suicidal or homicidal ideation   Orientation:   Patient is alert, awake and oriented x 3   Patient has fair insight into her illness, judgment and impulse control are Improving    Recent Results (from the past 72 hour(s))   Comprehensive metabolic panel    Collection Time: 08/08/23  6:44 AM   Result Value Ref Range    Sodium 140 135 - 147 mmol/L    Potassium 4.0 3.5 - 5.3 mmol/L    Chloride 102 96 - 108 mmol/L    CO2 29 21 - 32 mmol/L    ANION GAP 9 mmol/L    BUN 11 5 - 25 mg/dL    Creatinine 0.67 0.60 - 1.30 mg/dL    Glucose 91 65 - 140 mg/dL    Glucose, Fasting 91 65 - 99 mg/dL    Calcium 9.2 8.4 - 10.2 mg/dL    AST 18 13 - 39 U/L ALT 43 7 - 52 U/L    Alkaline Phosphatase 40 34 - 104 U/L    Total Protein 6.1 (L) 6.4 - 8.4 g/dL    Albumin 3.8 3.5 - 5.0 g/dL    Total Bilirubin 0.34 0.20 - 1.00 mg/dL    eGFR 102 ml/min/1.73sq m   CBC and differential    Collection Time: 08/08/23  6:44 AM   Result Value Ref Range    WBC 11.91 (H) 4.31 - 10.16 Thousand/uL    RBC 4.66 3.81 - 5.12 Million/uL    Hemoglobin 14.2 11.5 - 15.4 g/dL    Hematocrit 43.8 34.8 - 46.1 %    MCV 94 82 - 98 fL    MCH 30.5 26.8 - 34.3 pg    MCHC 32.4 31.4 - 37.4 g/dL    RDW 15.5 (H) 11.6 - 15.1 %    MPV 8.9 8.9 - 12.7 fL    Platelets 192 104 - 044 Thousands/uL    nRBC 0 /100 WBCs    Neutrophils Relative 60 43 - 75 %    Immat GRANS % 0 0 - 2 %    Lymphocytes Relative 29 14 - 44 %    Monocytes Relative 8 4 - 12 %    Eosinophils Relative 2 0 - 6 %    Basophils Relative 1 0 - 1 %    Neutrophils Absolute 7.08 1.85 - 7.62 Thousands/µL    Immature Grans Absolute 0.04 0.00 - 0.20 Thousand/uL    Lymphocytes Absolute 3.50 0.60 - 4.47 Thousands/µL    Monocytes Absolute 0.97 0.17 - 1.22 Thousand/µL    Eosinophils Absolute 0.21 0.00 - 0.61 Thousand/µL    Basophils Absolute 0.11 (H) 0.00 - 0.10 Thousands/µL   Total Syphilis (IgG/IgM) Screening    Collection Time: 08/08/23  6:44 AM   Result Value Ref Range    Syphilis Total Antibody Non-reactive Non-Reactive   Lipid panel    Collection Time: 08/08/23  6:44 AM   Result Value Ref Range    Cholesterol 176 See Comment mg/dL    Triglycerides 148 See Comment mg/dL    HDL, Direct 59 >=50 mg/dL    LDL Calculated 87 0 - 100 mg/dL    Non-HDL-Chol (CHOL-HDL) 117 mg/dl   Hemoglobin A1C    Collection Time: 08/08/23  6:44 AM   Result Value Ref Range    Hemoglobin A1C 5.9 (H) Normal 4.0-5.6%; PreDiabetic 5.7-6.4%; Diabetic >=6.5%; Glycemic control for adults with diabetes <7.0% %     mg/dl       Progress Toward Goals: Progressing, less preoccupied with PRN but still needs medication adjusted.      Assessment     Principal Problem:    Major depressive disorder with psychotic features [facility])  Active Problems:    Hypokalemia    Headache    Generalized anxiety disorder    Acquired hypothyroidism    Leukocytosis    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HCC)    Crohn's colitis (HCC)    Seizure (HCC)    Bladder tumor    Encephalopathy chronic    Medical clearance for psychiatric admission    Seasonal allergies    GERD without esophagitis    Benzodiazepine misuse        Plan :    - Medications;   Psychiatric:    Sertraline 150mg daily   Increase Abilify 10mg daily for psychosis.   Trazodone 50mg HS for sleep  Ativan 0.25 daily (day 3)     Medical: per SLIM    -Therapy: occupational therapy, milieu and group therapy  - Legal: 201   -Disposition: Home when stable

## 2023-08-09 NOTE — PROGRESS NOTES
Pt attended [id number] [facility] Recovery: routine group  Pt able to self express and making slow and steady progress. Pt noted her role changing (kids getting older/activities) and dealing stressors and with medical needs. Pt expressed interest in adapting and changing needs. 08/09/23 1100   Activity/Group Checklist   Group Other (Comment)  ( Recovery: routine)   Attendance Attended   Attendance Duration (min) 31-45   Interactions Interacted appropriately   Affect/Mood Appropriate   Goals Achieved Identified feelings; Identified relapse prevention strategies; Able to listen to others; Able to engage in interactions; Able to reflect/comment on own behavior;Able to manage/cope with feelings;Verbalized increased hopefulness; Able to self-disclose; Able to recieve feedback; Discussed coping strategies

## 2023-08-09 NOTE — NURSING NOTE
Patient is pleasant and cooperative . She is visible, engaged in conversation with other peers, coloring, coming to the groups. Walking hallways.  Patient stated she is managing anxiety and depression better, denies SI,HI,A,V/H

## 2023-08-10 LAB
BILIRUB UR QL STRIP: NEGATIVE
CLARITY UR: CLEAR
COLOR UR: NORMAL
GLUCOSE UR STRIP-MCNC: NEGATIVE MG/DL
HGB UR QL STRIP.AUTO: NEGATIVE
KETONES UR STRIP-MCNC: NEGATIVE MG/DL
LEUKOCYTE ESTERASE UR QL STRIP: NEGATIVE
NITRITE UR QL STRIP: NEGATIVE
PH UR STRIP.AUTO: 8 [PH]
PROT UR STRIP-MCNC: NEGATIVE MG/DL
SP GR UR STRIP.AUTO: 1.01 (ref 1–1.04)
UROBILINOGEN UA: NEGATIVE MG/DL

## 2023-08-10 PROCEDURE — 99232 SBSQ HOSP IP/OBS MODERATE 35: CPT | Performed by: STUDENT IN AN ORGANIZED HEALTH CARE EDUCATION/TRAINING PROGRAM

## 2023-08-10 PROCEDURE — 81003 URINALYSIS AUTO W/O SCOPE: CPT | Performed by: NURSE PRACTITIONER

## 2023-08-10 RX ORDER — DIPHENHYDRAMINE HCL 25 MG
25 TABLET ORAL EVERY 6 HOURS PRN
Status: DISCONTINUED | OUTPATIENT
Start: 2023-08-10 | End: 2023-08-15 | Stop reason: HOSPADM

## 2023-08-10 RX ORDER — MAGNESIUM HYDROXIDE/ALUMINUM HYDROXICE/SIMETHICONE 120; 1200; 1200 MG/30ML; MG/30ML; MG/30ML
30 SUSPENSION ORAL EVERY 4 HOURS PRN
Status: DISCONTINUED | OUTPATIENT
Start: 2023-08-10 | End: 2023-08-15 | Stop reason: HOSPADM

## 2023-08-10 RX ORDER — MAGNESIUM HYDROXIDE/ALUMINUM HYDROXICE/SIMETHICONE 120; 1200; 1200 MG/30ML; MG/30ML; MG/30ML
30 SUSPENSION ORAL ONCE
Status: COMPLETED | OUTPATIENT
Start: 2023-08-10 | End: 2023-08-10

## 2023-08-10 RX ORDER — SACCHAROMYCES BOULARDII 250 MG
250 CAPSULE ORAL 2 TIMES DAILY
Status: DISCONTINUED | OUTPATIENT
Start: 2023-08-10 | End: 2023-08-15 | Stop reason: HOSPADM

## 2023-08-10 RX ORDER — FAMOTIDINE 20 MG/1
20 TABLET, FILM COATED ORAL
Status: DISCONTINUED | OUTPATIENT
Start: 2023-08-10 | End: 2023-08-15 | Stop reason: HOSPADM

## 2023-08-10 RX ADMIN — ACETAMINOPHEN 650 MG: 325 TABLET ORAL at 16:01

## 2023-08-10 RX ADMIN — CHOLECALCIFEROL TAB 25 MCG (1000 UNIT) 1000 UNITS: 25 TAB at 08:35

## 2023-08-10 RX ADMIN — FAMOTIDINE 20 MG: 20 TABLET, FILM COATED ORAL at 21:13

## 2023-08-10 RX ADMIN — Medication 250 MG: at 17:20

## 2023-08-10 RX ADMIN — LEVOTHYROXINE SODIUM 100 MCG: 100 TABLET ORAL at 06:30

## 2023-08-10 RX ADMIN — DIPHENHYDRAMINE HYDROCHLORIDE 25 MG: 25 TABLET ORAL at 16:01

## 2023-08-10 RX ADMIN — LIOTHYRONINE SODIUM 5 MCG: 5 TABLET ORAL at 08:36

## 2023-08-10 RX ADMIN — MONTELUKAST 10 MG: 10 TABLET, FILM COATED ORAL at 21:13

## 2023-08-10 RX ADMIN — TRAZODONE HYDROCHLORIDE 50 MG: 50 TABLET ORAL at 21:13

## 2023-08-10 RX ADMIN — PREDNISONE 20 MG: 20 TABLET ORAL at 08:35

## 2023-08-10 RX ADMIN — Medication 250 MG: at 13:02

## 2023-08-10 RX ADMIN — LORAZEPAM 0.25 MG: 0.5 TABLET ORAL at 08:32

## 2023-08-10 RX ADMIN — ARIPIPRAZOLE 10 MG: 10 TABLET ORAL at 08:35

## 2023-08-10 RX ADMIN — ALUMINUM HYDROXIDE, MAGNESIUM HYDROXIDE, AND SIMETHICONE 30 ML: 200; 200; 20 SUSPENSION ORAL at 13:02

## 2023-08-10 RX ADMIN — CETIRIZINE HYDROCHLORIDE 20 MG: 10 TABLET, FILM COATED ORAL at 08:37

## 2023-08-10 RX ADMIN — FAMOTIDINE 40 MG: 20 TABLET ORAL at 08:35

## 2023-08-10 RX ADMIN — SERTRALINE HYDROCHLORIDE 150 MG: 100 TABLET ORAL at 08:35

## 2023-08-10 RX ADMIN — DIPHENHYDRAMINE HYDROCHLORIDE 50 MG: 25 TABLET ORAL at 06:30

## 2023-08-10 RX ADMIN — PANTOPRAZOLE SODIUM 40 MG: 40 TABLET, DELAYED RELEASE ORAL at 06:30

## 2023-08-10 NOTE — NURSING NOTE
Patient mostly withdrawn to room today d/t "feeling tired from medication". Patient was given PRN Benadryl at [address] [place] [address] for "reaction to detergent" from roommates freshly washed clothes. No SOB reported or other distressing symptoms. Patient reported some improvement with Benadryl. patiet also given ice pack per request. Appetite intact. Patient med compliant.  Continual rounding in place

## 2023-08-10 NOTE — PLAN OF CARE
Problem: Alteration in Thoughts and Perception  Goal: Treatment Goal: Gain control of psychotic behaviors/thinking, reduce/eliminate presenting symptoms and demonstrate improved reality functioning upon discharge  Outcome: Not Progressing  Goal: Verbalize thoughts and feelings  Description: Interventions:  - Promote a nonjudgmental and trusting relationship with the patient through active listening and therapeutic communication  - Assess patient's level of functioning, behavior and potential for risk  - Engage patient in 1 on 1 interactions  - Encourage patient to express fears, feelings, frustrations, and discuss symptoms    - [place] patient to reality, help patient recognize reality-based thinking   - Administer medications as ordered and assess for potential side effects  - Provide the patient education related to the signs and symptoms of the illness and desired effects of prescribed medications  Outcome: Not Progressing  Goal: Refrain from acting on delusional thinking/internal stimuli  Description: Interventions:  - Monitor patient closely, per order   - Utilize least restrictive measures   - Set reasonable limits, give positive feedback for acceptable   - Administer medications as ordered and monitor of potential side effects  Outcome: Not Progressing  Goal: Agree to be compliant with medication regime, as prescribed and report medication side effects  Description: Interventions:  - Offer appropriate PRN medication and supervise ingestion; conduct AIMS, as needed   Outcome: Not Progressing  Goal: Attend and participate in unit activities, including therapeutic, recreational, and educational groups  Description: Interventions:  -Encourage Visitation and family involvement in care  Outcome: Not Progressing  Goal: Recognize dysfunctional thoughts, communicate reality-based thoughts at the time of discharge  Description: Interventions:  - Provide medication and psycho-education to assist patient in compliance and developing insight into his/her illness   Outcome: Not Progressing  Goal: Complete daily ADLs, including personal hygiene independently, as able  Description: Interventions:  - Observe, teach, and assist patient with ADLS  - Monitor and promote a balance of rest/activity, with adequate nutrition and elimination   Outcome: Not Progressing     Problem: Ineffective Coping  Goal: Identifies ineffective coping skills  Outcome: Not Progressing  Goal: Identifies healthy coping skills  Outcome: Not Progressing  Goal: Demonstrates healthy coping skills  Outcome: Not Progressing  Goal: Participates in unit activities  Description: Interventions:  - Provide therapeutic environment   - Provide required programming   - Redirect inappropriate behaviors   Outcome: Not Progressing  Goal: Patient/Family participate in treatment and DC plans  Description: Interventions:  - Provide therapeutic environment  Outcome: Not Progressing  Goal: Patient/Family verbalizes awareness of resources  Outcome: Not Progressing  Goal: Understands least restrictive measures  Description: Interventions:  - Utilize least restrictive behavior  Outcome: Not Progressing  Goal: Free from restraint events  Description: - Utilize least restrictive measures   - Provide behavioral interventions   - Redirect inappropriate behaviors   Outcome: Not Progressing     Problem: Risk for Self Injury/Neglect  Goal: Treatment Goal: Remain safe during length of stay, learn and adopt new coping skills, and be free of self-injurious ideation, impulses and acts at the time of discharge  Outcome: Not Progressing  Goal: Verbalize thoughts and feelings  Description: Interventions:  - Assess and re-assess patient's lethality and potential for self-injury  - Engage patient in 1:1 interactions, daily, for a minimum of 15 minutes  - Encourage patient to express feelings, fears, frustrations, hopes  - Establish rapport/trust with patient   Outcome: Not Progressing  Goal: Refrain from harming self  Description: Interventions:  - Monitor patient closely, per order  - Develop a trusting relationship  - Supervise medication ingestion, monitor effects and side effects   Outcome: Not Progressing  Goal: Attend and participate in unit activities, including therapeutic, recreational, and educational groups  Description: Interventions:  - Provide therapeutic and educational activities daily, encourage attendance and participation, and document same in the medical record  - Obtain collateral information, encourage visitation and family involvement in care   Outcome: Not Progressing  Goal: Recognize maladaptive responses and adopt new coping mechanisms  Outcome: Not Progressing  Goal: Complete daily ADLs, including personal hygiene independently, as able  Description: Interventions:  - Observe, teach, and assist patient with ADLS  - Monitor and promote a balance of rest/activity, with adequate nutrition and elimination  Outcome: Not Progressing     Problem: Depression  Goal: Treatment Goal: Demonstrate behavioral control of depressive symptoms, verbalize feelings of improved mood/affect, and adopt new coping skills prior to discharge  Outcome: Not Progressing  Goal: Verbalize thoughts and feelings  Description: Interventions:  - Assess and re-assess patient's level of risk   - Engage patient in 1:1 interactions, daily, for a minimum of 15 minutes   - Encourage patient to express feelings, fears, frustrations, hopes   Outcome: Not Progressing  Goal: Refrain from harming self  Description: Interventions:  - Monitor patient closely, per order   - Supervise medication ingestion, monitor effects and side effects   Outcome: Not Progressing  Goal: Refrain from isolation  Description: Interventions:  - Develop a trusting relationship   - Encourage socialization   Outcome: Not Progressing  Goal: Refrain from self-neglect  Outcome: Not Progressing  Goal: Attend and participate in unit activities, including therapeutic, recreational, and educational groups  Description: Interventions:  - Provide therapeutic and educational activities daily, encourage attendance and participation, and document same in the medical record   Outcome: Not Progressing  Goal: Complete daily ADLs, including personal hygiene independently, as able  Description: Interventions:  - Observe, teach, and assist patient with ADLS  -  Monitor and promote a balance of rest/activity, with adequate nutrition and elimination   Outcome: Not Progressing     Problem: Anxiety  Goal: Anxiety is at manageable level  Description: Interventions:  - Assess and monitor patient's anxiety level. - Monitor for signs and symptoms (heart palpitations, chest pain, shortness of breath, headaches, nausea, feeling jumpy, restlessness, irritable, apprehensive). - Collaborate with interdisciplinary team and initiate plan and interventions as ordered.   - [place] patient to unit/surroundings  - Explain treatment plan  - Encourage participation in care  - Encourage verbalization of concerns/fears  - Identify coping mechanisms  - Assist in developing anxiety-reducing skills  - Administer/offer alternative therapies  - Limit or eliminate stimulants  Outcome: Not Progressing     Problem: Risk for Violence/Aggression Toward Others  Goal: Verbalize thoughts and feelings  Description: Interventions:  - Assess and re-assess patient's level of risk, every waking shift  - Engage patient in 1:1 interactions, daily, for a minimum of 15 minutes   - Allow patient to express feelings and frustrations in a safe and non-threatening manner   - Establish rapport/trust with patient   Outcome: Not Progressing  Goal: Refrain from harming others  Outcome: Not Progressing  Goal: Refrain from destructive acts on the environment or property  Outcome: Not Progressing  Goal: Control angry outbursts  Description: Interventions:  - Monitor patient closely, per order  - Ensure early verbal de-escalation  - Monitor prn medication needs  - Set reasonable/therapeutic limits, outline behavioral expectations, and consequences   - Provide a non-threatening milieu, utilizing the least restrictive interventions   Outcome: Not Progressing  Goal: Attend and participate in unit activities, including therapeutic, recreational, and educational groups  Description: Interventions:  - Provide therapeutic and educational activities daily, encourage attendance and participation, and document same in the medical record   Outcome: Not Progressing  Goal: Identify appropriate positive anger management techniques  Description: Interventions:  - Offer anger management and coping skills groups   - Staff will provide positive feedback for appropriate anger control  Outcome: Not Progressing     Problem: Alteration in Orientation  Goal: Treatment Goal: Demonstrate a reduction of confusion and improved orientation to person, place, time and/or situation upon discharge, according to optimum baseline/ability  Outcome: Not Progressing  Goal: Interact with staff daily  Description: Interventions:  - Assess and re-assess patient's level of orientation  - Engage patient in 1 on 1 interactions, daily, for a minimum of 15 minutes   - Establish rapport/trust with patient   Outcome: Not Progressing  Goal: Express concerns related to confused thinking related to:  Description: Interventions:  - Encourage patient to express feelings, fears, frustrations, hopes  - Assign consistent caregivers   - [place]/re-orient patient as needed  - Allow comfort items, as appropriate  - Provide visual cues, signs, etc.   Outcome: Not Progressing  Goal: Allow medical examinations, as recommended  Description: Interventions:  - Provide physical/neurological exams and/or referrals, per provider   Outcome: Not Progressing  Goal: Cooperate with recommended testing/procedures  Description: Interventions:  - Determine need for ancillary testing  - Observe for mental status changes  - Implement falls/precaution protocol   Outcome: Not Progressing  Goal: Attend and participate in unit activities, including therapeutic, recreational, and educational groups  Description: Interventions:  - Provide therapeutic and educational activities daily, encourage attendance and participation, and document same in the medical record   - Provide appropriate opportunities for reminiscence   - Provide a consistent daily routine   - Encourage family contact/visitation   Outcome: Not Progressing  Goal: Complete daily ADLs, including personal hygiene independently, as able  Description: Interventions:  - Observe, teach, and assist patient with ADLS  Outcome: Not Progressing     Problem: DISCHARGE PLANNING  Goal: Discharge to home or other facility with appropriate resources  Description: INTERVENTIONS:  - Identify barriers to discharge w/patient and caregiver  - Arrange for needed discharge resources and transportation as appropriate  - Identify discharge learning needs (meds, wound care, etc.)  - Arrange for interpretive services to assist at discharge as needed  - Refer to Case Management Department for coordinating discharge planning if the patient needs post-hospital services based on physician/advanced practitioner order or complex needs related to functional status, cognitive ability, or social support system  Outcome: Not Progressing

## 2023-08-10 NOTE — NURSING NOTE
Reassessed pain from Tylenol and Benadryl. 4/10 pain continues. Dawood Perez mentioned to writer if PA would come and visit with her. Looked through notes, PA was in earlier in shift around 1230 to visit with patient.  Will continue to monitor and assess for safety

## 2023-08-10 NOTE — QUICK NOTE
Asked to evaluate patient for abdominal pain. Abdomen soft. Recent admission to [facility]. Ann of Crohns and Mast cell activation syndrome. Recent RUQ US w/ dilated CBD, MRCP wo choledocholithiasis. On chronic Prednisone 20 mg daily. On chronic PPI and Pepcid. Check CMP in am. Check UA. Add probiotic and trial Maalox x1. Last BM 8/10, so low suspicion for constipation. Continue to monitor.

## 2023-08-10 NOTE — NURSING NOTE
Patient c/o itchiness in her eyes and abdominal pain. PRN Benadryl 50 mg administered at 0630. Patient offered PRN Tylenol as ordered but refused stating "I'm allergic to Tylenol. I take stronger stuff at home." Explained ordered pain medications but she continued to refuse.

## 2023-08-10 NOTE — NURSING NOTE
Vero Moran c/o horribly abdomen pain that she has been up since 6am because of the pain. Offered PRN medications earlier in the shift for pain. Tylenol and Mylanta, pt refused. Vero Moran stated that she can not take the Tylenol because she has a reaction because of it. Despite pt has been receiving Tylenol. @9909 Celine received 650mgTylenol for pain 6/10 and Benadryl, in fear of having a reaction. Vero Moran questioned why am I only receiving 1 Benadryl. Vero Moran states she has red spots on her abdomen, assessed site, finding cherry angiomas.  Will continue to monitor and assess for safety

## 2023-08-10 NOTE — PROGRESS NOTES
08/10/23 0759   Team Meeting   Meeting Type Daily Rounds   Initial Conference Date 08/10/23   Next Conference Date 08/11/23   Team Members Present   Team Members Present Physician;Nurse;;   Physician Team Member Dr Dion Martinez Team [id number] Member [facility] Work Team Member Omer Hart   Patient/Family Present   Patient Present No   Patient's Family Present No     Upset that roommate had clothes around the room drying, said she had an allergy issue about the clothing, somatic, reports itchy eyes this am, has benadryl yesterday and this morning.  Abilify increased

## 2023-08-10 NOTE — NURSING NOTE
Latrice Hernandez is anxious, cooperative, and pleasant on approach. Latrice Hernandez denies all. Latrice Hernandez states she is having an off day, stating something is brewing, also stating my stomach is upset. PRN medications Tylenol and Mylanta ordered, refused. Latrice Hernandez is isolative and withdrawn. Latrice Hernandez did not attend group today. Latrice Hernandez is somatic. Ltarice Hernandez states she is eating well. Latrice Hernandez state she wakes up early in the am, unable to go back to sleep.  Will continue to monitor and assess for safety

## 2023-08-10 NOTE — PROGRESS NOTES
Progress Note - Behavioral Health   [place] 46 y.o. female MRN: [id number]  Unit/Bed#: [facility] 428-10 Encounter: [id number]    The patient was seen for continuing care and reviewed with treatment team. Pt continues to very very somatically focused, this morning she c/o abdominal pain, says this disturbed her sleep. She reports similar pain in past due to Crohns. Abdomen examined it is soft, not distended with some tenderness around right lower quadrant. Pt reports mood is improving but some anxiety came up related to multiple medical concerns( having MS, Crohns, Seizures, Mast cell activation)        Sleep- She sleeps well with the trazodone 50mg    Appetite - good, but reports she is skipping meals 2/2 stomach  Energy: fair. No suicidal or homicidal ideations.  No AVH  ROS:   As reported above      /90 (BP Location: Left arm)   Pulse (!) 111   Temp (!) 96.9 °F (36.1 °C) (Temporal)   Resp 18   Ht 5' 5" (1.651 m)   Wt 66.4 kg (146 lb 6.4 oz)   SpO2 97%   BMI 24.36 kg/m²     Current Mental Status Evaluation:  Appearance:  Adequate hygiene and grooming   Behavior:  Calm, Cooperative and Pleasant   Mood:  anxious   Affect: anxious   Speech: Normal volume and Normal rate   Thought Process:  Goal directed and coherent ,    Thought Content:  Does not verbalize delusional material,  Somatic preoccupations, abdomen   Perceptual Disturbances: Denies hallucinations and does not appear to be responding to internal stimuli,    Risk Potential: No suicidal or homicidal ideation   Orientation:   Patient is alert, awake and oriented x 3   Patient has fair insight into her illness, judgment and impulse control are Improving    Recent Results (from the past 72 hour(s))   Comprehensive metabolic panel    Collection Time: 08/08/23  6:44 AM   Result Value Ref Range    Sodium 140 135 - 147 mmol/L    Potassium 4.0 3.5 - 5.3 mmol/L    Chloride 102 96 - 108 mmol/L    CO2 29 21 - 32 mmol/L    ANION GAP 9 mmol/L    BUN 11 5 - 25 mg/dL    Creatinine 0.67 0.60 - 1.30 mg/dL    Glucose 91 65 - 140 mg/dL    Glucose, Fasting 91 65 - 99 mg/dL    Calcium 9.2 8.4 - 10.2 mg/dL    AST 18 13 - 39 U/L    ALT 43 7 - 52 U/L    Alkaline Phosphatase 40 34 - 104 U/L    Total Protein 6.1 (L) 6.4 - 8.4 g/dL    Albumin 3.8 3.5 - 5.0 g/dL    Total Bilirubin 0.34 0.20 - 1.00 mg/dL    eGFR 102 ml/min/1.73sq m   CBC and differential    Collection Time: 08/08/23  6:44 AM   Result Value Ref Range    WBC 11.91 (H) 4.31 - 10.16 Thousand/uL    RBC 4.66 3.81 - 5.12 Million/uL    Hemoglobin 14.2 11.5 - 15.4 g/dL    Hematocrit 43.8 34.8 - 46.1 %    MCV 94 82 - 98 fL    MCH 30.5 26.8 - 34.3 pg    MCHC 32.4 31.4 - 37.4 g/dL    RDW 15.5 (H) 11.6 - 15.1 %    MPV 8.9 8.9 - 12.7 fL    Platelets 184 586 - 690 Thousands/uL    nRBC 0 /100 WBCs    Neutrophils Relative 60 43 - 75 %    Immat GRANS % 0 0 - 2 %    Lymphocytes Relative 29 14 - 44 %    Monocytes Relative 8 4 - 12 %    Eosinophils Relative 2 0 - 6 %    Basophils Relative 1 0 - 1 %    Neutrophils Absolute 7.08 1.85 - 7.62 Thousands/µL    Immature Grans Absolute 0.04 0.00 - 0.20 Thousand/uL    Lymphocytes Absolute 3.50 0.60 - 4.47 Thousands/µL    Monocytes Absolute 0.97 0.17 - 1.22 Thousand/µL    Eosinophils Absolute 0.21 0.00 - 0.61 Thousand/µL    Basophils Absolute 0.11 (H) 0.00 - 0.10 Thousands/µL   Total Syphilis (IgG/IgM) Screening    Collection Time: 08/08/23  6:44 AM   Result Value Ref Range    Syphilis Total Antibody Non-reactive Non-Reactive   Lipid panel    Collection Time: 08/08/23  6:44 AM   Result Value Ref Range    Cholesterol 176 See Comment mg/dL    Triglycerides 148 See Comment mg/dL    HDL, Direct 59 >=50 mg/dL    LDL Calculated 87 0 - 100 mg/dL    Non-HDL-Chol (CHOL-HDL) 117 mg/dl   Hemoglobin A1C    Collection Time: 08/08/23  6:44 AM   Result Value Ref Range    Hemoglobin A1C 5.9 (H) Normal 4.0-5.6%; PreDiabetic 5.7-6.4%;  Diabetic >=6.5%; Glycemic control for adults with diabetes <7.0% %     mg/dl       Progress Toward Goals: Progressing,  No medication adjustment needed today. Pt needs appoints scheduled with Immunologist/; allergy doctor at discharge. Assessment     Principal Problem:    Major depressive disorder with psychotic features ([facility])  Active Problems:    Hypokalemia    Headache    Generalized anxiety disorder    Acquired hypothyroidism    Leukocytosis    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HCC)    Crohn's colitis (HCC)    Seizure (HCC)    Bladder tumor    Encephalopathy chronic    Medical clearance for psychiatric admission    Seasonal allergies    GERD without esophagitis    Benzodiazepine misuse        Plan :    - Medications;   Psychiatric:    Sertraline 150mg daily   Abilify 10mg daily for psychosis.   Trazodone 50mg HS for sleep  Ativan 0.25 daily (day 4)     Medical: per SLIM, address abdominal pain    -Therapy: occupational therapy, milieu and group therapy  - Legal: 201   -Disposition: Home when stable

## 2023-08-11 LAB
ALBUMIN SERPL BCP-MCNC: 3.7 G/DL (ref 3.5–5)
ALP SERPL-CCNC: 41 U/L (ref 34–104)
ALT SERPL W P-5'-P-CCNC: 39 U/L (ref 7–52)
ANION GAP SERPL CALCULATED.3IONS-SCNC: 9 MMOL/L
AST SERPL W P-5'-P-CCNC: 21 U/L (ref 13–39)
BILIRUB SERPL-MCNC: 0.32 MG/DL (ref 0.2–1)
BUN SERPL-MCNC: 10 MG/DL (ref 5–25)
CALCIUM SERPL-MCNC: 8.9 MG/DL (ref 8.4–10.2)
CHLORIDE SERPL-SCNC: 104 MMOL/L (ref 96–108)
CO2 SERPL-SCNC: 28 MMOL/L (ref 21–32)
CREAT SERPL-MCNC: 0.7 MG/DL (ref 0.6–1.3)
GFR SERPL CREATININE-BSD FRML MDRD: 101 ML/MIN/1.73SQ M
GLUCOSE P FAST SERPL-MCNC: 88 MG/DL (ref 65–99)
GLUCOSE SERPL-MCNC: 88 MG/DL (ref 65–140)
LIPASE SERPL-CCNC: 21 U/L (ref 11–82)
MAGNESIUM SERPL-MCNC: 2.3 MG/DL (ref 1.9–2.7)
PHOSPHATE SERPL-MCNC: 4 MG/DL (ref 2.7–4.5)
POTASSIUM SERPL-SCNC: 3.7 MMOL/L (ref 3.5–5.3)
PROT SERPL-MCNC: 5.9 G/DL (ref 6.4–8.4)
SODIUM SERPL-SCNC: 141 MMOL/L (ref 135–147)

## 2023-08-11 PROCEDURE — 83735 ASSAY OF MAGNESIUM: CPT | Performed by: NURSE PRACTITIONER

## 2023-08-11 PROCEDURE — 84100 ASSAY OF PHOSPHORUS: CPT | Performed by: NURSE PRACTITIONER

## 2023-08-11 PROCEDURE — 80053 COMPREHEN METABOLIC PANEL: CPT | Performed by: NURSE PRACTITIONER

## 2023-08-11 PROCEDURE — 83690 ASSAY OF LIPASE: CPT | Performed by: NURSE PRACTITIONER

## 2023-08-11 PROCEDURE — 99232 SBSQ HOSP IP/OBS MODERATE 35: CPT | Performed by: STUDENT IN AN ORGANIZED HEALTH CARE EDUCATION/TRAINING PROGRAM

## 2023-08-11 RX ORDER — ARIPIPRAZOLE 15 MG/1
15 TABLET ORAL DAILY
Status: DISCONTINUED | OUTPATIENT
Start: 2023-08-12 | End: 2023-08-11

## 2023-08-11 RX ORDER — ARIPIPRAZOLE 10 MG/1
10 TABLET ORAL DAILY
Status: DISCONTINUED | OUTPATIENT
Start: 2023-08-12 | End: 2023-08-15 | Stop reason: HOSPADM

## 2023-08-11 RX ADMIN — DIPHENHYDRAMINE HYDROCHLORIDE 25 MG: 25 TABLET ORAL at 05:58

## 2023-08-11 RX ADMIN — PANTOPRAZOLE SODIUM 40 MG: 40 TABLET, DELAYED RELEASE ORAL at 05:18

## 2023-08-11 RX ADMIN — ACETAMINOPHEN 650 MG: 325 TABLET ORAL at 06:01

## 2023-08-11 RX ADMIN — ALUMINUM HYDROXIDE, MAGNESIUM HYDROXIDE, AND SIMETHICONE 30 ML: 200; 200; 20 SUSPENSION ORAL at 15:36

## 2023-08-11 RX ADMIN — CETIRIZINE HYDROCHLORIDE 20 MG: 10 TABLET, FILM COATED ORAL at 08:34

## 2023-08-11 RX ADMIN — ARIPIPRAZOLE 10 MG: 10 TABLET ORAL at 08:34

## 2023-08-11 RX ADMIN — LORAZEPAM 0.25 MG: 0.5 TABLET ORAL at 08:34

## 2023-08-11 RX ADMIN — TRAZODONE HYDROCHLORIDE 50 MG: 50 TABLET ORAL at 21:20

## 2023-08-11 RX ADMIN — FAMOTIDINE 20 MG: 20 TABLET, FILM COATED ORAL at 21:20

## 2023-08-11 RX ADMIN — CHOLECALCIFEROL TAB 25 MCG (1000 UNIT) 1000 UNITS: 25 TAB at 08:35

## 2023-08-11 RX ADMIN — SERTRALINE HYDROCHLORIDE 150 MG: 100 TABLET ORAL at 08:34

## 2023-08-11 RX ADMIN — MONTELUKAST 10 MG: 10 TABLET, FILM COATED ORAL at 21:21

## 2023-08-11 RX ADMIN — LIOTHYRONINE SODIUM 5 MCG: 5 TABLET ORAL at 08:35

## 2023-08-11 RX ADMIN — PREDNISONE 20 MG: 20 TABLET ORAL at 08:35

## 2023-08-11 RX ADMIN — Medication 250 MG: at 17:06

## 2023-08-11 RX ADMIN — Medication 250 MG: at 08:35

## 2023-08-11 RX ADMIN — LEVOTHYROXINE SODIUM 100 MCG: 100 TABLET ORAL at 05:18

## 2023-08-11 NOTE — PLAN OF CARE
Problem: Alteration in Thoughts and Perception  Goal: Treatment Goal: Gain control of psychotic behaviors/thinking, reduce/eliminate presenting symptoms and demonstrate improved reality functioning upon discharge  Outcome: Progressing  Goal: Verbalize thoughts and feelings  Description: Interventions:  - Promote a nonjudgmental and trusting relationship with the patient through active listening and therapeutic communication  - Assess patient's level of functioning, behavior and potential for risk  - Engage patient in 1 on 1 interactions  - Encourage patient to express fears, feelings, frustrations, and discuss symptoms    - [place] patient to reality, help patient recognize reality-based thinking   - Administer medications as ordered and assess for potential side effects  - Provide the patient education related to the signs and symptoms of the illness and desired effects of prescribed medications  Outcome: Progressing  Goal: Refrain from acting on delusional thinking/internal stimuli  Description: Interventions:  - Monitor patient closely, per order   - Utilize least restrictive measures   - Set reasonable limits, give positive feedback for acceptable   - Administer medications as ordered and monitor of potential side effects  Outcome: Progressing  Goal: Agree to be compliant with medication regime, as prescribed and report medication side effects  Description: Interventions:  - Offer appropriate PRN medication and supervise ingestion; conduct AIMS, as needed   Outcome: Progressing  Goal: Recognize dysfunctional thoughts, communicate reality-based thoughts at the time of discharge  Description: Interventions:  - Provide medication and psycho-education to assist patient in compliance and developing insight into his/her illness   Outcome: Progressing  Goal: Complete daily ADLs, including personal hygiene independently, as able  Description: Interventions:  - Observe, teach, and assist patient with ADLS  - Monitor and promote a balance of rest/activity, with adequate nutrition and elimination   Outcome: Progressing     Problem: Ineffective Coping  Goal: Identifies ineffective coping skills  Outcome: Progressing  Goal: Identifies healthy coping skills  Outcome: Progressing  Goal: Demonstrates healthy coping skills  Outcome: Progressing  Goal: Patient/Family participate in treatment and DC plans  Description: Interventions:  - Provide therapeutic environment  Outcome: Progressing  Goal: Patient/Family verbalizes awareness of resources  Outcome: Progressing  Goal: Understands least restrictive measures  Description: Interventions:  - Utilize least restrictive behavior  Outcome: Progressing  Goal: Free from restraint events  Description: - Utilize least restrictive measures   - Provide behavioral interventions   - Redirect inappropriate behaviors   Outcome: Progressing     Problem: Risk for Self Injury/Neglect  Goal: Treatment Goal: Remain safe during length of stay, learn and adopt new coping skills, and be free of self-injurious ideation, impulses and acts at the time of discharge  Outcome: Progressing  Goal: Verbalize thoughts and feelings  Description: Interventions:  - Assess and re-assess patient's lethality and potential for self-injury  - Engage patient in 1:1 interactions, daily, for a minimum of 15 minutes  - Encourage patient to express feelings, fears, frustrations, hopes  - Establish rapport/trust with patient   Outcome: Progressing  Goal: Refrain from harming self  Description: Interventions:  - Monitor patient closely, per order  - Develop a trusting relationship  - Supervise medication ingestion, monitor effects and side effects   Outcome: Progressing  Goal: Recognize maladaptive responses and adopt new coping mechanisms  Outcome: Progressing  Goal: Complete daily ADLs, including personal hygiene independently, as able  Description: Interventions:  - Observe, teach, and assist patient with ADLS  - Monitor and promote a balance of rest/activity, with adequate nutrition and elimination  Outcome: Progressing     Problem: Depression  Goal: Treatment Goal: Demonstrate behavioral control of depressive symptoms, verbalize feelings of improved mood/affect, and adopt new coping skills prior to discharge  Outcome: Progressing  Goal: Verbalize thoughts and feelings  Description: Interventions:  - Assess and re-assess patient's level of risk   - Engage patient in 1:1 interactions, daily, for a minimum of 15 minutes   - Encourage patient to express feelings, fears, frustrations, hopes   Outcome: Progressing  Goal: Refrain from harming self  Description: Interventions:  - Monitor patient closely, per order   - Supervise medication ingestion, monitor effects and side effects   Outcome: Progressing  Goal: Refrain from isolation  Description: Interventions:  - Develop a trusting relationship   - Encourage socialization   Outcome: Progressing  Goal: Refrain from self-neglect  Outcome: Progressing  Goal: Complete daily ADLs, including personal hygiene independently, as able  Description: Interventions:  - Observe, teach, and assist patient with ADLS  -  Monitor and promote a balance of rest/activity, with adequate nutrition and elimination   Outcome: Progressing     Problem: Anxiety  Goal: Anxiety is at manageable level  Description: Interventions:  - Assess and monitor patient's anxiety level. - Monitor for signs and symptoms (heart palpitations, chest pain, shortness of breath, headaches, nausea, feeling jumpy, restlessness, irritable, apprehensive). - Collaborate with interdisciplinary team and initiate plan and interventions as ordered.   - [place] patient to unit/surroundings  - Explain treatment plan  - Encourage participation in care  - Encourage verbalization of concerns/fears  - Identify coping mechanisms  - Assist in developing anxiety-reducing skills  - Administer/offer alternative therapies  - Limit or eliminate stimulants  Outcome: Progressing     Problem: Risk for Violence/Aggression Toward Others  Goal: Verbalize thoughts and feelings  Description: Interventions:  - Assess and re-assess patient's level of risk, every waking shift  - Engage patient in 1:1 interactions, daily, for a minimum of 15 minutes   - Allow patient to express feelings and frustrations in a safe and non-threatening manner   - Establish rapport/trust with patient   Outcome: Progressing  Goal: Refrain from harming others  Outcome: Progressing  Goal: Refrain from destructive acts on the environment or property  Outcome: Progressing  Goal: Control angry outbursts  Description: Interventions:  - Monitor patient closely, per order  - Ensure early verbal de-escalation  - Monitor prn medication needs  - Set reasonable/therapeutic limits, outline behavioral expectations, and consequences   - Provide a non-threatening milieu, utilizing the least restrictive interventions   Outcome: Progressing  Goal: Identify appropriate positive anger management techniques  Description: Interventions:  - Offer anger management and coping skills groups   - Staff will provide positive feedback for appropriate anger control  Outcome: Progressing     Problem: Alteration in Orientation  Goal: Treatment Goal: Demonstrate a reduction of confusion and improved orientation to person, place, time and/or situation upon discharge, according to optimum baseline/ability  Outcome: Progressing  Goal: Interact with staff daily  Description: Interventions:  - Assess and re-assess patient's level of orientation  - Engage patient in 1 on 1 interactions, daily, for a minimum of 15 minutes   - Establish rapport/trust with patient   Outcome: Progressing  Goal: Express concerns related to confused thinking related to:  Description: Interventions:  - Encourage patient to express feelings, fears, frustrations, hopes  - Assign consistent caregivers   - [place]/re-orient patient as needed  - Allow comfort items, as appropriate  - Provide visual cues, signs, etc.   Outcome: Progressing  Goal: Allow medical examinations, as recommended  Description: Interventions:  - Provide physical/neurological exams and/or referrals, per provider   Outcome: Progressing  Goal: Cooperate with recommended testing/procedures  Description: Interventions:  - Determine need for ancillary testing  - Observe for mental status changes  - Implement falls/precaution protocol   Outcome: Progressing  Goal: Complete daily ADLs, including personal hygiene independently, as able  Description: Interventions:  - Observe, teach, and assist patient with ADLS  Outcome: Progressing

## 2023-08-11 NOTE — TREATMENT TEAM
Pt attended [address] [facility] Recovery: self care and mindfulness group. Pt making slow and steady progress towards [facility] recovery goals. ,  Pt able to engage in 2 topics on self care action plan. Pt noted mindfulness techniques to make her feel calm (sitting on patio looking outdoors). Pt enjoys music from [employer] and sound of rain makes her feel relaxed. 08/11/23 1100   Activity/Group Checklist   Group Other (Comment)  ([facility] Recovery: self care and mindfulness)   Attendance Attended   Attendance Duration (min) 46-60   Interactions Interacted appropriately   Affect/Mood Appropriate;Calm   Goals Achieved Identified feelings; Discussed coping strategies; Able to engage in interactions; Able to listen to others; Able to reflect/comment on own behavior;Able to manage/cope with feelings;Verbalized increased hopefulness; Able to self-disclose;Discussed self-esteem issues; Displayed empathy

## 2023-08-11 NOTE — PROGRESS NOTES
08/11/23 0758   Team Meeting   Meeting Type Daily Rounds   Initial Conference Date 08/11/23   Next Conference Date 08/14/23   Team Members Present   Team Members Present Physician;Nurse;;   Physician Team Member Dr Danielle Diaz Team Member [phone] Management Team Member Scott   Social Work Team Member Lolly Zambrano   Patient/Family Present   Patient Present No   Patient's Family Present No     Benadryl and tylenol given, somatic complaints, discharge next week. Looks better and is brighter, no longer confused.

## 2023-08-11 NOTE — QUICK NOTE
Patient seen for follow-up regarding abdominal pain. Patient reports improvement in her RUQ and LUQ abdominal pain. Noted increased gas yesterday but improved today. Did well with 1x dose of Maalox yesterday. No nausea, vomiting, or diarrhea. Having regular BMs. Informed of her CMP results. She would like a lipase level checked which will be added to her morning blood work. Will continue Maalox q4h PRN.

## 2023-08-11 NOTE — PROGRESS NOTES
Progress Note - Behavioral Health   Donna He 46 y.o. female MRN: [id number]  Unit/Bed#: [facility] 132-71 Encounter: [id number]    The patient was seen for continuing care and reviewed with treatment team. She appears bright today, smiling. Continued to report concerns about allergies but " Today I am having a good day." Talks about plan to discontinue the ativan 0.25mg on Monday. Sleep- good, Takes trazodone 50mg HS every night  Appetite - good,  Energy: fair. No suicidal or homicidal ideations. No AVH  ROS:  Abdominal pain improved      /84 (BP Location: Left arm)   Pulse (!) 120   Temp 97.5 °F (36.4 °C) (Temporal)   Resp 18   Ht 5' 5" (1.651 m)   Wt 66.4 kg (146 lb 6.4 oz)   SpO2 94%   BMI 24.36 kg/m²     Current Mental Status Evaluation:  Appearance:  Adequate hygiene and grooming   Behavior:  Calm, Cooperative and Pleasant   Mood:  anxious   Affect: good   Speech: Normal volume and Normal rate   Thought Process:  Goal directed and coherent ,    Thought Content:  Does not verbalize delusional material,  More positive and hopeful today. Plan for discharge next week .     Perceptual Disturbances: Denies hallucinations and does not appear to be responding to internal stimuli,    Risk Potential: No suicidal or homicidal ideation   Orientation:   Patient is alert, awake and oriented x 3   Patient has fair insight into her illness, judgment and impulse control are Improving    Recent Results (from the past 72 hour(s))   UA w Reflex to Microscopic w Reflex to Culture    Collection Time: 08/10/23  1:25 PM    Specimen: Urine, Clean Catch   Result Value Ref Range    Color, UA Straw Straw, Yellow, Pale Yellow    Clarity, UA Clear Clear, Other    Specific Gravity, UA 1.010 1.003 - 1.040    pH, UA 8.0 4.5, 5.0, 5.5, 6.0, 6.5, 7.0, 7.5, 8.0    Leukocytes, UA Negative Negative    Nitrite, UA Negative Negative    Protein, UA Negative Negative mg/dl    Glucose, UA Negative Negative mg/dl    Ketones, UA Negative Negative mg/dl    Bilirubin, UA Negative Negative    Occult Blood, UA Negative Negative    UROBILINOGEN UA Negative 1.0, Negative mg/dL   Comprehensive metabolic panel    Collection Time: 08/11/23  5:12 AM   Result Value Ref Range    Sodium 141 135 - 147 mmol/L    Potassium 3.7 3.5 - 5.3 mmol/L    Chloride 104 96 - 108 mmol/L    CO2 28 21 - 32 mmol/L    ANION GAP 9 mmol/L    BUN 10 5 - 25 mg/dL    Creatinine 0.70 0.60 - 1.30 mg/dL    Glucose 88 65 - 140 mg/dL    Glucose, Fasting 88 65 - 99 mg/dL    Calcium 8.9 8.4 - 10.2 mg/dL    AST 21 13 - 39 U/L    ALT 39 7 - 52 U/L    Alkaline Phosphatase 41 34 - 104 U/L    Total Protein 5.9 (L) 6.4 - 8.4 g/dL    Albumin 3.7 3.5 - 5.0 g/dL    Total Bilirubin 0.32 0.20 - 1.00 mg/dL    eGFR 101 ml/min/1.73sq m   Magnesium    Collection Time: 08/11/23  5:12 AM   Result Value Ref Range    Magnesium 2.3 1.9 - 2.7 mg/dL   Phosphorus    Collection Time: 08/11/23  5:12 AM   Result Value Ref Range    Phosphorus 4.0 2.7 - 4.5 mg/dL   Lipase    Collection Time: 08/11/23  5:12 AM   Result Value Ref Range    Lipase 21 11 - 82 u/L       Progress Toward Goals: Progressing,  No medication adjustment needed today. Pt needs appointments  scheduled with Immunologist/; allergy doctor at discharge. She is making slow steady progress. Assessment     Principal Problem:    Major depressive disorder with psychotic features ([address])  Active Problems:    Hypokalemia    Headache    Generalized anxiety disorder    Acquired hypothyroidism    Leukocytosis    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HCC)    Crohn's colitis (HCC)    Seizure (HCC)    Bladder tumor    Encephalopathy chronic    Medical clearance for psychiatric admission    Seasonal allergies    GERD without esophagitis    Benzodiazepine misuse        Plan :    - Medications;   Psychiatric:    Sertraline 150mg daily   Abilify 10mg daily for psychosis.   Trazodone 50mg HS for sleep  Ativan 0.25 daily (day 5)     Medical: per SLIM, address abdominal pain    -Therapy: occupational therapy, milieu and group therapy  - Legal: 201   -Disposition: Home when stable

## 2023-08-11 NOTE — CASE MANAGEMENT
Called and left a message at the office of Gilles Her DO ([phone]) with [facility] in [facility] to schedule her an appointment with Allergy. Awaiting call back. Went to go talk to the patient regarding her allergy appointment. Advised that a message was left with them and they have not called back as of yet to schedule an appointment.

## 2023-08-12 PROCEDURE — 99232 SBSQ HOSP IP/OBS MODERATE 35: CPT | Performed by: STUDENT IN AN ORGANIZED HEALTH CARE EDUCATION/TRAINING PROGRAM

## 2023-08-12 RX ADMIN — Medication 250 MG: at 17:40

## 2023-08-12 RX ADMIN — HYDROXYZINE HYDROCHLORIDE 50 MG: 25 TABLET ORAL at 23:56

## 2023-08-12 RX ADMIN — CHOLECALCIFEROL TAB 25 MCG (1000 UNIT) 1000 UNITS: 25 TAB at 08:08

## 2023-08-12 RX ADMIN — DIPHENHYDRAMINE HYDROCHLORIDE 25 MG: 25 TABLET ORAL at 22:18

## 2023-08-12 RX ADMIN — PREDNISONE 20 MG: 20 TABLET ORAL at 08:09

## 2023-08-12 RX ADMIN — ARIPIPRAZOLE 10 MG: 10 TABLET ORAL at 08:09

## 2023-08-12 RX ADMIN — ACETAMINOPHEN 650 MG: 325 TABLET ORAL at 04:43

## 2023-08-12 RX ADMIN — PANTOPRAZOLE SODIUM 40 MG: 40 TABLET, DELAYED RELEASE ORAL at 06:00

## 2023-08-12 RX ADMIN — SERTRALINE HYDROCHLORIDE 150 MG: 100 TABLET ORAL at 08:09

## 2023-08-12 RX ADMIN — FAMOTIDINE 20 MG: 20 TABLET, FILM COATED ORAL at 21:12

## 2023-08-12 RX ADMIN — LIOTHYRONINE SODIUM 5 MCG: 5 TABLET ORAL at 08:08

## 2023-08-12 RX ADMIN — TRAZODONE HYDROCHLORIDE 50 MG: 50 TABLET ORAL at 21:12

## 2023-08-12 RX ADMIN — Medication 250 MG: at 08:08

## 2023-08-12 RX ADMIN — CETIRIZINE HYDROCHLORIDE 20 MG: 10 TABLET, FILM COATED ORAL at 08:10

## 2023-08-12 RX ADMIN — LEVOTHYROXINE SODIUM 100 MCG: 100 TABLET ORAL at 06:00

## 2023-08-12 RX ADMIN — BUTALBITAL, ACETAMINOPHEN, AND CAFFEINE 1 TABLET: 50; 325; 40 TABLET ORAL at 08:08

## 2023-08-12 RX ADMIN — MONTELUKAST 10 MG: 10 TABLET, FILM COATED ORAL at 21:12

## 2023-08-12 RX ADMIN — LORAZEPAM 0.25 MG: 0.5 TABLET ORAL at 08:08

## 2023-08-12 NOTE — PLAN OF CARE
Problem: Risk for Self Injury/Neglect  Goal: Treatment Goal: Remain safe during length of stay, learn and adopt new coping skills, and be free of self-injurious ideation, impulses and acts at the time of discharge  Outcome: Progressing  Goal: Verbalize thoughts and feelings  Description: Interventions:  - Assess and re-assess patient's lethality and potential for self-injury  - Engage patient in 1:1 interactions, daily, for a minimum of 15 minutes  - Encourage patient to express feelings, fears, frustrations, hopes  - Establish rapport/trust with patient   Outcome: Progressing  Goal: Refrain from harming self  Description: Interventions:  - Monitor patient closely, per order  - Develop a trusting relationship  - Supervise medication ingestion, monitor effects and side effects   Outcome: Progressing  Goal: Attend and participate in unit activities, including therapeutic, recreational, and educational groups  Description: Interventions:  - Provide therapeutic and educational activities daily, encourage attendance and participation, and document same in the medical record  - Obtain collateral information, encourage visitation and family involvement in care   Outcome: Progressing  Goal: Recognize maladaptive responses and adopt new coping mechanisms  Outcome: Progressing  Goal: Complete daily ADLs, including personal hygiene independently, as able  Description: Interventions:  - Observe, teach, and assist patient with ADLS  - Monitor and promote a balance of rest/activity, with adequate nutrition and elimination  Outcome: Progressing     Problem: Risk for Violence/Aggression Toward Others  Goal: Verbalize thoughts and feelings  Description: Interventions:  - Assess and re-assess patient's level of risk, every waking shift  - Engage patient in 1:1 interactions, daily, for a minimum of 15 minutes   - Allow patient to express feelings and frustrations in a safe and non-threatening manner   - Establish rapport/trust with patient   Outcome: Progressing  Goal: Refrain from harming others  Outcome: Progressing  Goal: Refrain from destructive acts on the environment or property  Outcome: Progressing  Goal: Control angry outbursts  Description: Interventions:  - Monitor patient closely, per order  - Ensure early verbal de-escalation  - Monitor prn medication needs  - Set reasonable/therapeutic limits, outline behavioral expectations, and consequences   - Provide a non-threatening milieu, utilizing the least restrictive interventions   Outcome: Progressing  Goal: Attend and participate in unit activities, including therapeutic, recreational, and educational groups  Description: Interventions:  - Provide therapeutic and educational activities daily, encourage attendance and participation, and document same in the medical record   Outcome: Progressing  Goal: Identify appropriate positive anger management techniques  Description: Interventions:  - Offer anger management and coping skills groups   - Staff will provide positive feedback for appropriate anger control  Outcome: Progressing

## 2023-08-12 NOTE — NURSING NOTE
C/o headache 6/10, tylenol 650 mg given at 0443 was effective as patient was asleep on reassessment.

## 2023-08-12 NOTE — NURSING NOTE
Patient was visible in the milieu socializing with select peers while doing coloring activity. VSS. Denies all psych s/s. No complaints offered. No behaviors noted. Took her HS medications. Safety checks ongoing.
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
## 2023-08-12 NOTE — PLAN OF CARE
Problem: Alteration in Thoughts and Perception  Goal: Treatment Goal: Gain control of psychotic behaviors/thinking, reduce/eliminate presenting symptoms and demonstrate improved reality functioning upon discharge  Outcome: Progressing  Goal: Verbalize thoughts and feelings  Description: Interventions:  - Promote a nonjudgmental and trusting relationship with the patient through active listening and therapeutic communication  - Assess patient's level of functioning, behavior and potential for risk  - Engage patient in 1 on 1 interactions  - Encourage patient to express fears, feelings, frustrations, and discuss symptoms    - [place] patient to reality, help patient recognize reality-based thinking   - Administer medications as ordered and assess for potential side effects  - Provide the patient education related to the signs and symptoms of the illness and desired effects of prescribed medications  Outcome: Progressing  Goal: Refrain from acting on delusional thinking/internal stimuli  Description: Interventions:  - Monitor patient closely, per order   - Utilize least restrictive measures   - Set reasonable limits, give positive feedback for acceptable   - Administer medications as ordered and monitor of potential side effects  Outcome: Progressing  Goal: Agree to be compliant with medication regime, as prescribed and report medication side effects  Description: Interventions:  - Offer appropriate PRN medication and supervise ingestion; conduct AIMS, as needed   Outcome: Progressing  Goal: Recognize dysfunctional thoughts, communicate reality-based thoughts at the time of discharge  Description: Interventions:  - Provide medication and psycho-education to assist patient in compliance and developing insight into his/her illness   Outcome: Progressing  Goal: Complete daily ADLs, including personal hygiene independently, as able  Description: Interventions:  - Observe, teach, and assist patient with ADLS  - Monitor and promote a balance of rest/activity, with adequate nutrition and elimination   Outcome: Progressing     Problem: Ineffective Coping  Goal: Identifies ineffective coping skills  Outcome: Progressing  Goal: Identifies healthy coping skills  Outcome: Progressing  Goal: Demonstrates healthy coping skills  Outcome: Progressing  Goal: Patient/Family participate in treatment and DC plans  Description: Interventions:  - Provide therapeutic environment  Outcome: Progressing  Goal: Patient/Family verbalizes awareness of resources  Outcome: Progressing  Goal: Understands least restrictive measures  Description: Interventions:  - Utilize least restrictive behavior  Outcome: Progressing  Goal: Free from restraint events  Description: - Utilize least restrictive measures   - Provide behavioral interventions   - Redirect inappropriate behaviors   Outcome: Progressing     Problem: Risk for Self Injury/Neglect  Goal: Treatment Goal: Remain safe during length of stay, learn and adopt new coping skills, and be free of self-injurious ideation, impulses and acts at the time of discharge  Outcome: Progressing  Goal: Verbalize thoughts and feelings  Description: Interventions:  - Assess and re-assess patient's lethality and potential for self-injury  - Engage patient in 1:1 interactions, daily, for a minimum of 15 minutes  - Encourage patient to express feelings, fears, frustrations, hopes  - Establish rapport/trust with patient   Outcome: Progressing  Goal: Refrain from harming self  Description: Interventions:  - Monitor patient closely, per order  - Develop a trusting relationship  - Supervise medication ingestion, monitor effects and side effects   Outcome: Progressing  Goal: Recognize maladaptive responses and adopt new coping mechanisms  Outcome: Progressing  Goal: Complete daily ADLs, including personal hygiene independently, as able  Description: Interventions:  - Observe, teach, and assist patient with ADLS  - Monitor and promote a balance of rest/activity, with adequate nutrition and elimination  Outcome: Progressing     Problem: Depression  Goal: Treatment Goal: Demonstrate behavioral control of depressive symptoms, verbalize feelings of improved mood/affect, and adopt new coping skills prior to discharge  Outcome: Progressing  Goal: Verbalize thoughts and feelings  Description: Interventions:  - Assess and re-assess patient's level of risk   - Engage patient in 1:1 interactions, daily, for a minimum of 15 minutes   - Encourage patient to express feelings, fears, frustrations, hopes   Outcome: Progressing  Goal: Refrain from harming self  Description: Interventions:  - Monitor patient closely, per order   - Supervise medication ingestion, monitor effects and side effects   Outcome: Progressing  Goal: Refrain from isolation  Description: Interventions:  - Develop a trusting relationship   - Encourage socialization   Outcome: Progressing  Goal: Refrain from self-neglect  Outcome: Progressing  Goal: Complete daily ADLs, including personal hygiene independently, as able  Description: Interventions:  - Observe, teach, and assist patient with ADLS  -  Monitor and promote a balance of rest/activity, with adequate nutrition and elimination   Outcome: Progressing     Problem: Anxiety  Goal: Anxiety is at manageable level  Description: Interventions:  - Assess and monitor patient's anxiety level. - Monitor for signs and symptoms (heart palpitations, chest pain, shortness of breath, headaches, nausea, feeling jumpy, restlessness, irritable, apprehensive). - Collaborate with interdisciplinary team and initiate plan and interventions as ordered.   - [place] patient to unit/surroundings  - Explain treatment plan  - Encourage participation in care  - Encourage verbalization of concerns/fears  - Identify coping mechanisms  - Assist in developing anxiety-reducing skills  - Administer/offer alternative therapies  - Limit or eliminate stimulants  Outcome: Progressing     Problem: Risk for Violence/Aggression Toward Others  Goal: Verbalize thoughts and feelings  Description: Interventions:  - Assess and re-assess patient's level of risk, every waking shift  - Engage patient in 1:1 interactions, daily, for a minimum of 15 minutes   - Allow patient to express feelings and frustrations in a safe and non-threatening manner   - Establish rapport/trust with patient   Outcome: Progressing  Goal: Refrain from harming others  Outcome: Progressing  Goal: Refrain from destructive acts on the environment or property  Outcome: Progressing  Goal: Control angry outbursts  Description: Interventions:  - Monitor patient closely, per order  - Ensure early verbal de-escalation  - Monitor prn medication needs  - Set reasonable/therapeutic limits, outline behavioral expectations, and consequences   - Provide a non-threatening milieu, utilizing the least restrictive interventions   Outcome: Progressing  Goal: Identify appropriate positive anger management techniques  Description: Interventions:  - Offer anger management and coping skills groups   - Staff will provide positive feedback for appropriate anger control  Outcome: Progressing     Problem: Alteration in Orientation  Goal: Treatment Goal: Demonstrate a reduction of confusion and improved orientation to person, place, time and/or situation upon discharge, according to optimum baseline/ability  Outcome: Progressing  Goal: Interact with staff daily  Description: Interventions:  - Assess and re-assess patient's level of orientation  - Engage patient in 1 on 1 interactions, daily, for a minimum of 15 minutes   - Establish rapport/trust with patient   Outcome: Progressing  Goal: Express concerns related to confused thinking related to:  Description: Interventions:  - Encourage patient to express feelings, fears, frustrations, hopes  - Assign consistent caregivers   - [place]/re-orient patient as needed  - Allow comfort items, as appropriate  - Provide visual cues, signs, etc.   Outcome: Progressing  Goal: Allow medical examinations, as recommended  Description: Interventions:  - Provide physical/neurological exams and/or referrals, per provider   Outcome: Progressing  Goal: Cooperate with recommended testing/procedures  Description: Interventions:  - Determine need for ancillary testing  - Observe for mental status changes  - Implement falls/precaution protocol   Outcome: Progressing  Goal: Complete daily ADLs, including personal hygiene independently, as able  Description: Interventions:  - Observe, teach, and assist patient with ADLS  Outcome: Progressing

## 2023-08-13 PROCEDURE — 99232 SBSQ HOSP IP/OBS MODERATE 35: CPT | Performed by: STUDENT IN AN ORGANIZED HEALTH CARE EDUCATION/TRAINING PROGRAM

## 2023-08-13 RX ORDER — PANTOPRAZOLE SODIUM 20 MG/1
20 TABLET, DELAYED RELEASE ORAL
Status: DISCONTINUED | OUTPATIENT
Start: 2023-08-13 | End: 2023-08-15 | Stop reason: HOSPADM

## 2023-08-13 RX ORDER — LANOLIN ALCOHOL/MO/W.PET/CERES
3 CREAM (GRAM) TOPICAL
Status: DISCONTINUED | OUTPATIENT
Start: 2023-08-13 | End: 2023-08-15 | Stop reason: HOSPADM

## 2023-08-13 RX ADMIN — Medication 3 MG: at 21:13

## 2023-08-13 RX ADMIN — LORAZEPAM 0.25 MG: 0.5 TABLET ORAL at 09:14

## 2023-08-13 RX ADMIN — TRAZODONE HYDROCHLORIDE 50 MG: 50 TABLET ORAL at 21:13

## 2023-08-13 RX ADMIN — LEVOTHYROXINE SODIUM 100 MCG: 100 TABLET ORAL at 05:50

## 2023-08-13 RX ADMIN — ACETAMINOPHEN 975 MG: 325 TABLET ORAL at 05:51

## 2023-08-13 RX ADMIN — BUTALBITAL, ACETAMINOPHEN, AND CAFFEINE 1 TABLET: 50; 325; 40 TABLET ORAL at 18:40

## 2023-08-13 RX ADMIN — ARIPIPRAZOLE 10 MG: 10 TABLET ORAL at 09:14

## 2023-08-13 RX ADMIN — Medication 250 MG: at 17:11

## 2023-08-13 RX ADMIN — CHOLECALCIFEROL TAB 25 MCG (1000 UNIT) 1000 UNITS: 25 TAB at 09:14

## 2023-08-13 RX ADMIN — FAMOTIDINE 20 MG: 20 TABLET, FILM COATED ORAL at 21:13

## 2023-08-13 RX ADMIN — MONTELUKAST 10 MG: 10 TABLET, FILM COATED ORAL at 21:13

## 2023-08-13 RX ADMIN — PANTOPRAZOLE SODIUM 40 MG: 40 TABLET, DELAYED RELEASE ORAL at 05:50

## 2023-08-13 RX ADMIN — PREDNISONE 20 MG: 20 TABLET ORAL at 09:14

## 2023-08-13 RX ADMIN — PANTOPRAZOLE SODIUM 20 MG: 20 TABLET, DELAYED RELEASE ORAL at 17:11

## 2023-08-13 RX ADMIN — SERTRALINE HYDROCHLORIDE 150 MG: 100 TABLET ORAL at 09:14

## 2023-08-13 RX ADMIN — Medication 250 MG: at 09:14

## 2023-08-13 RX ADMIN — CETIRIZINE HYDROCHLORIDE 20 MG: 10 TABLET, FILM COATED ORAL at 09:15

## 2023-08-13 RX ADMIN — LIOTHYRONINE SODIUM 5 MCG: 5 TABLET ORAL at 09:14

## 2023-08-13 NOTE — NURSING NOTE
Patient was visible in the milieu socializing with select peers while watching a football game. VSS. Denies all psych s/s. No complaints offered. No behaviors noted. Took her HS medications. Safety checks ongoing.

## 2023-08-13 NOTE — PLAN OF CARE
Problem: Alteration in Thoughts and Perception  Goal: Treatment Goal: Gain control of psychotic behaviors/thinking, reduce/eliminate presenting symptoms and demonstrate improved reality functioning upon discharge  Outcome: Progressing  Goal: Verbalize thoughts and feelings  Description: Interventions:  - Promote a nonjudgmental and trusting relationship with the patient through active listening and therapeutic communication  - Assess patient's level of functioning, behavior and potential for risk  - Engage patient in 1 on 1 interactions  - Encourage patient to express fears, feelings, frustrations, and discuss symptoms    - [place] patient to reality, help patient recognize reality-based thinking   - Administer medications as ordered and assess for potential side effects  - Provide the patient education related to the signs and symptoms of the illness and desired effects of prescribed medications  Outcome: Progressing  Goal: Refrain from acting on delusional thinking/internal stimuli  Description: Interventions:  - Monitor patient closely, per order   - Utilize least restrictive measures   - Set reasonable limits, give positive feedback for acceptable   - Administer medications as ordered and monitor of potential side effects  Outcome: Progressing  Goal: Agree to be compliant with medication regime, as prescribed and report medication side effects  Description: Interventions:  - Offer appropriate PRN medication and supervise ingestion; conduct AIMS, as needed   Outcome: Progressing  Goal: Recognize dysfunctional thoughts, communicate reality-based thoughts at the time of discharge  Description: Interventions:  - Provide medication and psycho-education to assist patient in compliance and developing insight into his/her illness   Outcome: Progressing  Goal: Complete daily ADLs, including personal hygiene independently, as able  Description: Interventions:  - Observe, teach, and assist patient with ADLS  - Monitor and promote a balance of rest/activity, with adequate nutrition and elimination   Outcome: Progressing     Problem: Alteration in Thoughts and Perception  Goal: Treatment Goal: Gain control of psychotic behaviors/thinking, reduce/eliminate presenting symptoms and demonstrate improved reality functioning upon discharge  Outcome: Progressing  Goal: Verbalize thoughts and feelings  Description: Interventions:  - Promote a nonjudgmental and trusting relationship with the patient through active listening and therapeutic communication  - Assess patient's level of functioning, behavior and potential for risk  - Engage patient in 1 on 1 interactions  - Encourage patient to express fears, feelings, frustrations, and discuss symptoms    - [place] patient to reality, help patient recognize reality-based thinking   - Administer medications as ordered and assess for potential side effects  - Provide the patient education related to the signs and symptoms of the illness and desired effects of prescribed medications  Outcome: Progressing  Goal: Refrain from acting on delusional thinking/internal stimuli  Description: Interventions:  - Monitor patient closely, per order   - Utilize least restrictive measures   - Set reasonable limits, give positive feedback for acceptable   - Administer medications as ordered and monitor of potential side effects  Outcome: Progressing  Goal: Agree to be compliant with medication regime, as prescribed and report medication side effects  Description: Interventions:  - Offer appropriate PRN medication and supervise ingestion; conduct AIMS, as needed   Outcome: Progressing  Goal: Recognize dysfunctional thoughts, communicate reality-based thoughts at the time of discharge  Description: Interventions:  - Provide medication and psycho-education to assist patient in compliance and developing insight into his/her illness   Outcome: Progressing  Goal: Complete daily ADLs, including personal hygiene independently, as able  Description: Interventions:  - Observe, teach, and assist patient with ADLS  - Monitor and promote a balance of rest/activity, with adequate nutrition and elimination   Outcome: Progressing     Problem: Ineffective Coping  Goal: Identifies ineffective coping skills  Outcome: Progressing  Goal: Identifies healthy coping skills  Outcome: Progressing  Goal: Demonstrates healthy coping skills  Outcome: Progressing  Goal: Patient/Family participate in treatment and DC plans  Description: Interventions:  - Provide therapeutic environment  Outcome: Progressing  Goal: Patient/Family verbalizes awareness of resources  Outcome: Progressing  Goal: Understands least restrictive measures  Description: Interventions:  - Utilize least restrictive behavior  Outcome: Progressing  Goal: Free from restraint events  Description: - Utilize least restrictive measures   - Provide behavioral interventions   - Redirect inappropriate behaviors   Outcome: Progressing     Problem: Risk for Self Injury/Neglect  Goal: Treatment Goal: Remain safe during length of stay, learn and adopt new coping skills, and be free of self-injurious ideation, impulses and acts at the time of discharge  Outcome: Progressing  Goal: Verbalize thoughts and feelings  Description: Interventions:  - Assess and re-assess patient's lethality and potential for self-injury  - Engage patient in 1:1 interactions, daily, for a minimum of 15 minutes  - Encourage patient to express feelings, fears, frustrations, hopes  - Establish rapport/trust with patient   Outcome: Progressing  Goal: Refrain from harming self  Description: Interventions:  - Monitor patient closely, per order  - Develop a trusting relationship  - Supervise medication ingestion, monitor effects and side effects   Outcome: Progressing  Goal: Recognize maladaptive responses and adopt new coping mechanisms  Outcome: Progressing  Goal: Complete daily ADLs, including personal hygiene independently, as able  Description: Interventions:  - Observe, teach, and assist patient with ADLS  - Monitor and promote a balance of rest/activity, with adequate nutrition and elimination  Outcome: Progressing     Problem: Depression  Goal: Treatment Goal: Demonstrate behavioral control of depressive symptoms, verbalize feelings of improved mood/affect, and adopt new coping skills prior to discharge  Outcome: Progressing  Goal: Verbalize thoughts and feelings  Description: Interventions:  - Assess and re-assess patient's level of risk   - Engage patient in 1:1 interactions, daily, for a minimum of 15 minutes   - Encourage patient to express feelings, fears, frustrations, hopes   Outcome: Progressing  Goal: Refrain from harming self  Description: Interventions:  - Monitor patient closely, per order   - Supervise medication ingestion, monitor effects and side effects   Outcome: Progressing  Goal: Refrain from isolation  Description: Interventions:  - Develop a trusting relationship   - Encourage socialization   Outcome: Progressing  Goal: Refrain from self-neglect  Outcome: Progressing  Goal: Attend and participate in unit activities, including therapeutic, recreational, and educational groups  Description: Interventions:  - Provide therapeutic and educational activities daily, encourage attendance and participation, and document same in the medical record   Outcome: Progressing  Goal: Complete daily ADLs, including personal hygiene independently, as able  Description: Interventions:  - Observe, teach, and assist patient with ADLS  -  Monitor and promote a balance of rest/activity, with adequate nutrition and elimination   Outcome: Progressing     Problem: Anxiety  Goal: Anxiety is at manageable level  Description: Interventions:  - Assess and monitor patient's anxiety level.    - Monitor for signs and symptoms (heart palpitations, chest pain, shortness of breath, headaches, nausea, feeling jumpy, restlessness, irritable, apprehensive). - Collaborate with interdisciplinary team and initiate plan and interventions as ordered.   - [place] patient to unit/surroundings  - Explain treatment plan  - Encourage participation in care  - Encourage verbalization of concerns/fears  - Identify coping mechanisms  - Assist in developing anxiety-reducing skills  - Administer/offer alternative therapies  - Limit or eliminate stimulants  Outcome: Progressing     Problem: Risk for Violence/Aggression Toward Others  Goal: Verbalize thoughts and feelings  Description: Interventions:  - Assess and re-assess patient's level of risk, every waking shift  - Engage patient in 1:1 interactions, daily, for a minimum of 15 minutes   - Allow patient to express feelings and frustrations in a safe and non-threatening manner   - Establish rapport/trust with patient   Outcome: Progressing  Goal: Refrain from harming others  Outcome: Progressing  Goal: Refrain from destructive acts on the environment or property  Outcome: Progressing  Goal: Control angry outbursts  Description: Interventions:  - Monitor patient closely, per order  - Ensure early verbal de-escalation  - Monitor prn medication needs  - Set reasonable/therapeutic limits, outline behavioral expectations, and consequences   - Provide a non-threatening milieu, utilizing the least restrictive interventions   Outcome: Progressing  Goal: Identify appropriate positive anger management techniques  Description: Interventions:  - Offer anger management and coping skills groups   - Staff will provide positive feedback for appropriate anger control  Outcome: Progressing     Problem: Alteration in Orientation  Goal: Treatment Goal: Demonstrate a reduction of confusion and improved orientation to person, place, time and/or situation upon discharge, according to optimum baseline/ability  Outcome: Progressing  Goal: Interact with staff daily  Description: Interventions:  - Assess and re-assess patient's level of orientation  - Engage patient in 1 on 1 interactions, daily, for a minimum of 15 minutes   - Establish rapport/trust with patient   Outcome: Progressing  Goal: Express concerns related to confused thinking related to:  Description: Interventions:  - Encourage patient to express feelings, fears, frustrations, hopes  - Assign consistent caregivers   - [place]/re-orient patient as needed  - Allow comfort items, as appropriate  - Provide visual cues, signs, etc.   Outcome: Progressing  Goal: Allow medical examinations, as recommended  Description: Interventions:  - Provide physical/neurological exams and/or referrals, per provider   Outcome: Progressing  Goal: Cooperate with recommended testing/procedures  Description: Interventions:  - Determine need for ancillary testing  - Observe for mental status changes  - Implement falls/precaution protocol   Outcome: Progressing  Goal: Complete daily ADLs, including personal hygiene independently, as able  Description: Interventions:  - Observe, teach, and assist patient with ADLS  Outcome: Progressing

## 2023-08-13 NOTE — NURSING NOTE
Patient is calm and cooperative. Social in milieu. Denies symptoms. Patient has had less somatic symptoms today.

## 2023-08-13 NOTE — PROGRESS NOTES
Progress Note - Behavioral Health     Francisco Ventura 46 y.o. female MRN: [id number]   Unit/Bed#: [facility] 606-01 Encounter: [id number]    Behavior over the last 24 hours: unchanged. Adams Juarez is seen today for followup. States she would like to completely d/c ativan and not take it tomorrow and see how she feels. States she would feel comfortable talking with Dr. Bladimir Hamliton if she feels she is having any side effects from stopping the medications. She states she is feeling tired today as she had poor sleep; felt that she was "unheard" when she expressed concerns that male peer walked into her room. States she was told "he was confused", but states it made her uncomfortable. Clarifies that he was redirected and he did not approach her, but left as soon as she told him it was not his room, and he now has a sign on his door. States she is feeling anxious for discharge, and wonders if she and her  can do the [facility] [place] program together. States she feels he needs therapy. States she is eating well, and would like to have melatonin at bedtime. Denies thoughts to hurt herself or anyone else, denies any hallucinations. Per nursing: Compliant with medications. Participates appropriately in milieu. Attends groups. More visible on the unit.     Sleep: improved  Appetite: normal  Medication side effects: No   ROS: all other systems are negative    Mental Status Evaluation:    Appearance:  age appropriate, casually dressed   Behavior:  pleasant, cooperative, restless and fidgety   Speech:  normal rate and volume   Mood:  improved, still at times anxious   Affect:  appropriate, minimally tearful at times discussing father in law   Thought Process:  goal directed, linear, normal rate of thoughts   Associations: intact associations   Thought Content:  no overt delusions   Perceptual Disturbances: no auditory hallucinations, no visual hallucinations, does not appear responding to internal stimuli   Risk Potential: Suicidal ideation - None  Homicidal ideation - None  Potential for aggression - No   Sensorium:  oriented to person, place and time/date   Memory:  recent and remote memory grossly intact   Consciousness:  alert and awake   Attention/Concentration: attention span and concentration are age appropriate   Insight:  age appropriate and improved   Judgment: age appropriate and improved   Gait/Station: normal gait/station, normal balance   Motor Activity: no abnormal movements     Vital signs in last 24 hours:    Temp:  [97.7 °F (36.5 °C)-98.3 °F (36.8 °C)] 97.7 °F (36.5 °C)  HR:  [] 117  Resp:  [17-18] 18  BP: (106-117)/(76-85) 106/85    Laboratory results: I have personally reviewed all pertinent laboratory/tests results    Results from the past 24 hours: No results found for this or any previous visit (from the past 24 hour(s)).     Suicide/Homicide Risk Assessment:    Risk of Harm to Self:   Nursing Suicide Risk Assessment Last 24 hours: C-SSRS Risk (Since Last Contact)  Calculated C-SSRS Risk Score (Since Last Contact): No Risk Indicated  Based on today's assessment, Rico Gross presents the following risk of harm to self: minimal    Risk of Harm to Others:  Nursing Homicide Risk Assessment: Violence Risk to Others: Denies within past 6 months  Based on today's assessment, Rico Gross presents the following risk of harm to others: minimal    The following interventions are recommended: behavioral checks every 7 minutes, continued hospitalization on locked unit    Progress Toward Goals: improving slowly, depression is improving, less anxious    Assessment/Plan   Principal Problem:    Major depressive disorder with psychotic features (HCC)  Active Problems:    Hypokalemia    Headache    Generalized anxiety disorder    Acquired hypothyroidism    Leukocytosis    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HCC)    Crohn's colitis ([address])    Seizure ([address])    Bladder tumor    Encephalopathy chronic    Medical clearance for psychiatric admission    Seasonal allergies    GERD without esophagitis    Benzodiazepine misuse    Seems to be doing well, motivated for treatment, less anxious than on admission. Recommended Treatment:     Planned medication and treatment changes: All current active medications have been reviewed  Encourage group therapy, milieu therapy and occupational therapy  Behavioral Health checks every 7 minutes  Observe over the weekend if continues to improve   Will d/c ativan as patient reports no side effects with lower dose and requests to try stopping medication one day early; advised to discuss with Dr. Adonay Goldstein tomorrow if she has any side effects, but very unlikely given long taper off of medication.     Continue current medications:    Current Facility-Administered Medications   Medication Dose Route Frequency Provider Last Rate   • acetaminophen  650 mg Oral Q4H PRN JONATHAN Mercado     • acetaminophen  650 mg Oral Q4H PRN JONATHAN Mercado     • acetaminophen  975 mg Oral Q6H PRN JONATHAN Mercado     • albuterol  2 puff Inhalation Q4H PRN JONATHAN Fam     • aluminum-magnesium hydroxide-simethicone  30 mL Oral Q4H PRN JONATHAN Hendricks     • ARIPiprazole  10 mg Oral Daily Mame Acevedo MD     • bisacodyl  5 mg Oral Daily PRN Mame Acevedo MD     • butalbital-acetaminophen-caffeine  1 tablet Oral Q8H PRN JONATHAN Mercado     • cetirizine  20 mg Oral Daily JONATHAN Fam     • cholecalciferol  1,000 Units Oral Daily Salima Castanon PA-C     • diphenhydrAMINE  25 mg Oral Q6H PRN JONATHAN Hendricks     • EPINEPHrine PF  0.3 mg Intramuscular Daily PRN JONATHAN Fam     • famotidine  20 mg Oral HS JONATHAN Hendricks     • haloperidol lactate  5 mg Intramuscular Q4H PRN Max 4/day JONATHAN Mercado     • hydrOXYzine HCL  25 mg Oral Q6H PRN Max 4/day JONATHAN Mercado     • hydrOXYzine HCL  50 mg Oral Q6H PRN Max 4/day JONATHAN Mercado     • levothyroxine  100 mcg Oral Early Morning JONATHAN Fam     • liothyronine  5 mcg Oral Daily JONATHAN Fam     • LORazepam  1 mg Intramuscular Q6H PRN Max 3/day Kenrick Allen PA-C     • LORazepam  0.25 mg Oral BID PRN Snehal Roa MD     • melatonin  3 mg Oral HS Froylanberny Imam Lewis, DO     • montelukast  10 mg Oral HS JONATHAN Fam     • pantoprazole  20 mg Oral BID AC JONATHAN Fam     • predniSONE  20 mg Oral Daily JONATHAN Fam     • risperiDONE  0.25 mg Oral Q4H PRN Max 6/day JONATHAN Hicks     • risperiDONE  0.5 mg Oral Q4H PRN Max 3/day JONATHAN Hicks     • risperiDONE  1 mg Oral Q2H PRN Max 3/day JONATHAN Hicks     • saccharomyces boulardii  250 mg Oral BID JONATHAN Estrella     • sertraline  150 mg Oral Daily Snehal Roa MD     • traMADol  50 mg Oral Q6H PRN JONATHAN Fam     • traZODone  50 mg Oral HS Snehal Roa MD       Risks / Benefits of Treatment:    Risks, benefits, and possible side effects of medications explained to patient and patient verbalizes understanding and agreement for treatment. Counseling / Coordination of Care: Total floor / unit time spent today 30 minutes. Greater than 50% of total time was spent with the patient and / or family counseling and / or coordination of care. A description of counseling / coordination of care:  Patient's progress discussed with staff in treatment team meeting. Medications, treatment progress and treatment plan reviewed with patient. Recent stressors discussed with patient including health issues and anxiety at evening. Coping strategies including compliance with medications, imagery relaxation and muscle relaxation reviewed with patient. Importance of medication and treatment compliance reviewed with patient. Cognitive techniques utilized during the session. Reassurance and supportive therapy provided.     Venita Ivy DO 08/13/23

## 2023-08-14 PROCEDURE — 99232 SBSQ HOSP IP/OBS MODERATE 35: CPT | Performed by: STUDENT IN AN ORGANIZED HEALTH CARE EDUCATION/TRAINING PROGRAM

## 2023-08-14 RX ADMIN — Medication 250 MG: at 17:11

## 2023-08-14 RX ADMIN — ACETAMINOPHEN 975 MG: 325 TABLET ORAL at 13:11

## 2023-08-14 RX ADMIN — TRAZODONE HYDROCHLORIDE 50 MG: 50 TABLET ORAL at 21:18

## 2023-08-14 RX ADMIN — Medication 3 MG: at 21:18

## 2023-08-14 RX ADMIN — BUTALBITAL, ACETAMINOPHEN, AND CAFFEINE 1 TABLET: 50; 325; 40 TABLET ORAL at 20:04

## 2023-08-14 RX ADMIN — LEVOTHYROXINE SODIUM 100 MCG: 100 TABLET ORAL at 06:31

## 2023-08-14 RX ADMIN — ARIPIPRAZOLE 10 MG: 10 TABLET ORAL at 08:31

## 2023-08-14 RX ADMIN — CETIRIZINE HYDROCHLORIDE 20 MG: 10 TABLET, FILM COATED ORAL at 08:33

## 2023-08-14 RX ADMIN — Medication 250 MG: at 08:32

## 2023-08-14 RX ADMIN — PREDNISONE 20 MG: 20 TABLET ORAL at 08:32

## 2023-08-14 RX ADMIN — SERTRALINE HYDROCHLORIDE 150 MG: 100 TABLET ORAL at 08:31

## 2023-08-14 RX ADMIN — ACETAMINOPHEN 975 MG: 325 TABLET ORAL at 17:59

## 2023-08-14 RX ADMIN — LIOTHYRONINE SODIUM 5 MCG: 5 TABLET ORAL at 08:31

## 2023-08-14 RX ADMIN — HYDROXYZINE HYDROCHLORIDE 50 MG: 25 TABLET ORAL at 13:55

## 2023-08-14 RX ADMIN — DIPHENHYDRAMINE HYDROCHLORIDE 25 MG: 25 TABLET ORAL at 13:48

## 2023-08-14 RX ADMIN — FAMOTIDINE 20 MG: 20 TABLET, FILM COATED ORAL at 21:18

## 2023-08-14 RX ADMIN — PANTOPRAZOLE SODIUM 20 MG: 20 TABLET, DELAYED RELEASE ORAL at 06:31

## 2023-08-14 RX ADMIN — CHOLECALCIFEROL TAB 25 MCG (1000 UNIT) 1000 UNITS: 25 TAB at 08:32

## 2023-08-14 RX ADMIN — MONTELUKAST 10 MG: 10 TABLET, FILM COATED ORAL at 21:18

## 2023-08-14 RX ADMIN — DIPHENHYDRAMINE HYDROCHLORIDE 25 MG: 25 TABLET ORAL at 17:59

## 2023-08-14 RX ADMIN — PANTOPRAZOLE SODIUM 20 MG: 20 TABLET, DELAYED RELEASE ORAL at 17:12

## 2023-08-14 NOTE — PROGRESS NOTES
08/14/23 1030 08/14/23 1330   Activity/Group Checklist   Group Community meeting  (topic: communication awareness.) Life Skills   Attendance Attended Did not attend   Attendance Duration (min) 31-45  --    Interactions Interacted appropriately  (Pt. reflected that she tends to be a better listener than communicator yet does feel that she makes an effort to smile at others throughout her day.)  --    Affect/Mood Appropriate;Normal range  --    Goals Achieved Able to listen to others; Able to engage in interactions; Discussed coping strategies; Identified feelings  --

## 2023-08-14 NOTE — NURSING NOTE
Patient is visible, calm, and pleasant on approach. Patient continues to deny all psych s/s. Medication compliant, and cooperative. No further distress noted.

## 2023-08-14 NOTE — NURSING NOTE
Patient c/o of 10/10 headache and increased itching. Requested and received PRN Tylenol 975 and Benadryl 25 mg @ 0062. Safety checks ongoing.

## 2023-08-14 NOTE — PLAN OF CARE
Problem: Alteration in Thoughts and Perception  Goal: Treatment Goal: Gain control of psychotic behaviors/thinking, reduce/eliminate presenting symptoms and demonstrate improved reality functioning upon discharge  Outcome: Progressing  Goal: Verbalize thoughts and feelings  Description: Interventions:  - Promote a nonjudgmental and trusting relationship with the patient through active listening and therapeutic communication  - Assess patient's level of functioning, behavior and potential for risk  - Engage patient in 1 on 1 interactions  - Encourage patient to express fears, feelings, frustrations, and discuss symptoms    - [place] patient to reality, help patient recognize reality-based thinking   - Administer medications as ordered and assess for potential side effects  - Provide the patient education related to the signs and symptoms of the illness and desired effects of prescribed medications  Outcome: Progressing  Goal: Refrain from acting on delusional thinking/internal stimuli  Description: Interventions:  - Monitor patient closely, per order   - Utilize least restrictive measures   - Set reasonable limits, give positive feedback for acceptable   - Administer medications as ordered and monitor of potential side effects  Outcome: Progressing  Goal: Agree to be compliant with medication regime, as prescribed and report medication side effects  Description: Interventions:  - Offer appropriate PRN medication and supervise ingestion; conduct AIMS, as needed   Outcome: Progressing  Goal: Attend and participate in unit activities, including therapeutic, recreational, and educational groups  Description: Interventions:  -Encourage Visitation and family involvement in care  Outcome: Progressing  Goal: Recognize dysfunctional thoughts, communicate reality-based thoughts at the time of discharge  Description: Interventions:  - Provide medication and psycho-education to assist patient in compliance and developing insight into his/her illness   Outcome: Progressing  Goal: Complete daily ADLs, including personal hygiene independently, as able  Description: Interventions:  - Observe, teach, and assist patient with ADLS  - Monitor and promote a balance of rest/activity, with adequate nutrition and elimination   Outcome: Progressing

## 2023-08-14 NOTE — DISCHARGE INSTR - APPOINTMENTS
Joy Trevizo or Lenora, our Efren and Maria Alejandra, will be calling you after your discharge, on the phone number that you provided. They will be available as an additional support, if needed. If you wish to speak with one of them, you may contact Joy Trevizo at [phone] or Dillan Romero at [phone].

## 2023-08-14 NOTE — PLAN OF CARE
Pt.spontaneously engaged in one of two groups and interacts with peers when not in structured groups.   Problem: Ineffective Coping  Goal: Participates in unit activities  Description: Interventions:  - Provide therapeutic environment   - Provide required programming   - Redirect inappropriate behaviors   Outcome: Progressing

## 2023-08-14 NOTE — PROGRESS NOTES
08/14/23 0824   Team Meeting   Meeting Type Daily Rounds   Initial Conference Date 08/14/23   Team Members Present   Team Members Present Physician;Nurse;;; Occupational Therapist   Physician Team Member [id number] Team Member [facility] Team Member [facility] [address] Work Team Member Howell Lisa   OT Team Member Kathy Rawls   Patient/Family Present   Patient Present No   Patient's Family Present No     Visible, social, engaged with peers. Somatic at times. Reported a migraine yesterday, no seizures over the weekend. Discharge is for tomorrow.

## 2023-08-14 NOTE — NURSING NOTE
Patient is c/o about detergent/ perfume on the unit make her itch, PRN Benadryl administered.  Patient also calls the nurse to c/o about palpitations, VSS, PRN atarax given

## 2023-08-14 NOTE — DISCHARGE INSTR - OTHER ORDERS
If you are experiencing a mental health emergency, you may call the [address] [place] [address] 24 hours a day, 7 days per week at [phone]. In [place], call [phone]. HOW TO GET SUBSTANCE ABUSE HELP:  If you or someone you know has a drug or alcohol problem, there is help:  [address]: [address]: [phone]  An assessment is the first step. In addition to those listed there are other programs available in the area but assessment is best to determine an appropriate level of care. If you DO NOT have Medical Assistance (MA) or [employer], an assessment can be scheduled at one of these providers:  8[facility]  315 [facility], [address] [place] [address]  [phone]   [facility] AND [facility]  1700 [facility],2 And 3 S Floors., [address]  [address] [facility]. [facility], [address] [place] [address]  1900 [facility]  1200 [address], [facility]   Step by 112 [address]  2450 [address] [facility].[address]  [address] [facility].[address]  [phone]     If you [address], an assessment can be scheduled at one of these providers:  [place] [facility]  [facility].[address]  830 [facility]  315 [facility], [address] [place] [address]  150 [facility]  [address] 1113 [facility], 1st Floor, [facility], [facility]  [phone]  1 [facility], [facility] 2000 E [facility]  1700 [facility],2 And 3 S Floors., [address]  [address] [facility]. [facility], 65 [facility]  [phone]   NET ([address])  90 [facility]  4633 [facility], Adriano HARKINS  [address]  [address] [facility], Yusuf   Step by 112 [address] [facility], 630 [facility]  [address] [facility]., [facility], 630 [facility]  2021 [facility], 350 [facility]  [phone]     If you [facility], an assessment can be scheduled at one of these providers. Please contact these Providers to determine if they are in your network plan:  [facility] D&A Intake Unit  [address] 1113 [facility], 1st Floor, Adriano HARKINS  [facility]  1700 [facility],2 And 3 S Floors., [facility], 350 [facility]  [phone] 223 [facility]. SHAHANA, [address] [facility] [address]  [phone]   NET ([address])  [address] 1801 [facility], Adriano HARKINS  [address]  [address] [facility], 1515 [facility]  2021 [facility], [address]    When you need someone to listen, the Dmitriy Rodriguez is available for 16 hours a day, 7 days a week, from the time of 7-10am and 2pm-2am.  It is not available from the hours of 2am-6am and 10am-2pm. A representative can be reached at [phone].

## 2023-08-14 NOTE — NURSING NOTE
Patient was visible in the milieu, social with select peers. VSS. Denies all psych s/s. No complaints offered. Took her HS medications. Safety checks ongoing.

## 2023-08-14 NOTE — PROGRESS NOTES
Progress Note - Behavioral Health   Jailyn Jara 46 y.o. female MRN: [id number]  Unit/Bed#: Tamela Jung 214-08 Encounter: [id number]    The patient was seen for continuing care and reviewed with treatment team. Pt is smiling,  Reports she had a good weekend. She requested a D/C of Ativan 0.25mg yesterday and reports she has not taken PRN for anxiety. She is less somatic,  Wants to work on her thoughts. Reports she was concerned when a male peer wandered into her room yesterday but reports she feels better and less anxious today. Sleep- good,  Appetite - good,  Energy: fair. No suicidal or homicidal ideations. ROS:  Abdominal pain improved      /86 (BP Location: Left arm)   Pulse (!) 118   Temp (!) 97.4 °F (36.3 °C) (Temporal)   Resp 18   Ht 5' 5" (1.651 m)   Wt 66.4 kg (146 lb 6.4 oz)   SpO2 98%   BMI 24.36 kg/m²     Current Mental Status Evaluation:  Appearance:  Adequate hygiene and grooming   Behavior:  Calm, Cooperative and Pleasant   Mood:  anxious   Affect: good   Speech: Normal volume and Normal rate   Thought Process:  Goal directed and coherent ,    Thought Content:  Does not verbalize delusional material,  More positive and hopeful today. Plan for discharge next week . Perceptual Disturbances: Denies hallucinations and does not appear to be responding to internal stimuli,    Risk Potential: No suicidal or homicidal ideation   Orientation:   Patient is alert, awake and oriented x 3   Patient has fair insight into her illness, judgment and impulse control are Improving    No results found for this or any previous visit (from the past 72 hour(s)). Progress Toward Goals: Progressing,  No medication adjustment needed today.          Assessment     Principal Problem:    Major depressive disorder with psychotic features ([address])  Active Problems:    Hypokalemia    Headache    Generalized anxiety disorder    Acquired hypothyroidism    Leukocytosis    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[address]) Crohn's colitis ([address])    Seizure ([address])    Bladder tumor    Encephalopathy chronic    Medical clearance for psychiatric admission    Seasonal allergies    GERD without esophagitis    Benzodiazepine misuse        Plan :    - Medications;   Psychiatric:   Sertraline 150mg daily   Abilify 10mg daily for psychosis.   Trazodone 50mg HS for sleep       Medical: per SLIM, address abdominal pain    -Therapy: occupational therapy, milieu and group therapy  - Legal: 201   -Disposition: plan for discharge wednesday

## 2023-08-15 ENCOUNTER — TELEPHONE (OUTPATIENT)
Dept: PSYCHOLOGY | Facility: CLINIC | Age: 48
End: 2023-08-15

## 2023-08-15 VITALS
RESPIRATION RATE: 16 BRPM | DIASTOLIC BLOOD PRESSURE: 84 MMHG | HEIGHT: 65 IN | WEIGHT: 143.8 LBS | OXYGEN SATURATION: 97 % | TEMPERATURE: 97.5 F | SYSTOLIC BLOOD PRESSURE: 130 MMHG | HEART RATE: 99 BPM | BODY MASS INDEX: 23.96 KG/M2

## 2023-08-15 LAB
ATRIAL RATE: 102 BPM
P AXIS: -4 DEGREES
PR INTERVAL: 124 MS
QRS AXIS: 47 DEGREES
QRSD INTERVAL: 88 MS
QT INTERVAL: 336 MS
QTC INTERVAL: 437 MS
T WAVE AXIS: 47 DEGREES
VENTRICULAR RATE: 102 BPM

## 2023-08-15 PROCEDURE — 93005 ELECTROCARDIOGRAM TRACING: CPT

## 2023-08-15 PROCEDURE — 93010 ELECTROCARDIOGRAM REPORT: CPT | Performed by: INTERNAL MEDICINE

## 2023-08-15 PROCEDURE — 99239 HOSP IP/OBS DSCHRG MGMT >30: CPT | Performed by: STUDENT IN AN ORGANIZED HEALTH CARE EDUCATION/TRAINING PROGRAM

## 2023-08-15 RX ORDER — PREDNISONE 20 MG/1
20 TABLET ORAL DAILY
Qty: 60 TABLET | Refills: 0
Start: 2023-08-15

## 2023-08-15 RX ORDER — ARIPIPRAZOLE 10 MG/1
10 TABLET ORAL DAILY
Qty: 30 TABLET | Refills: 0 | Status: SHIPPED | OUTPATIENT
Start: 2023-08-16

## 2023-08-15 RX ORDER — SERTRALINE HYDROCHLORIDE 100 MG/1
150 TABLET, FILM COATED ORAL DAILY
Qty: 45 TABLET | Refills: 0 | Status: SHIPPED | OUTPATIENT
Start: 2023-08-16

## 2023-08-15 RX ORDER — FAMOTIDINE 40 MG/1
20 TABLET, FILM COATED ORAL DAILY
Qty: 90 TABLET | Refills: 0 | Status: SHIPPED | OUTPATIENT
Start: 2023-08-15

## 2023-08-15 RX ORDER — MONTELUKAST SODIUM 10 MG/1
10 TABLET ORAL
Refills: 0
Start: 2023-08-15

## 2023-08-15 RX ORDER — LANOLIN ALCOHOL/MO/W.PET/CERES
3 CREAM (GRAM) TOPICAL
Qty: 30 TABLET | Refills: 0 | Status: SHIPPED | OUTPATIENT
Start: 2023-08-15

## 2023-08-15 RX ORDER — TRAZODONE HYDROCHLORIDE 50 MG/1
50 TABLET ORAL
Qty: 30 TABLET | Refills: 0 | Status: SHIPPED | OUTPATIENT
Start: 2023-08-15

## 2023-08-15 RX ADMIN — CETIRIZINE HYDROCHLORIDE 20 MG: 10 TABLET, FILM COATED ORAL at 08:14

## 2023-08-15 RX ADMIN — Medication 250 MG: at 08:13

## 2023-08-15 RX ADMIN — ARIPIPRAZOLE 10 MG: 10 TABLET ORAL at 08:14

## 2023-08-15 RX ADMIN — PANTOPRAZOLE SODIUM 20 MG: 20 TABLET, DELAYED RELEASE ORAL at 06:23

## 2023-08-15 RX ADMIN — LIOTHYRONINE SODIUM 5 MCG: 5 TABLET ORAL at 08:14

## 2023-08-15 RX ADMIN — SERTRALINE HYDROCHLORIDE 150 MG: 100 TABLET ORAL at 08:13

## 2023-08-15 RX ADMIN — PREDNISONE 20 MG: 20 TABLET ORAL at 08:14

## 2023-08-15 RX ADMIN — CHOLECALCIFEROL TAB 25 MCG (1000 UNIT) 1000 UNITS: 25 TAB at 08:13

## 2023-08-15 RX ADMIN — LEVOTHYROXINE SODIUM 100 MCG: 100 TABLET ORAL at 06:23

## 2023-08-15 NOTE — NURSING NOTE
Patient intermittently visible on the unit this shift. Attentive to roommate. Denies all psych s/s. Mood more stable now that pain is managed. Medication compliant. Encouraged to inform staff of any needs or concerns.

## 2023-08-15 NOTE — PLAN OF CARE
Problem: Alteration in Thoughts and Perception  Goal: Treatment Goal: Gain control of psychotic behaviors/thinking, reduce/eliminate presenting symptoms and demonstrate improved reality functioning upon discharge  Outcome: Adequate for Discharge  Goal: Verbalize thoughts and feelings  Description: Interventions:  - Promote a nonjudgmental and trusting relationship with the patient through active listening and therapeutic communication  - Assess patient's level of functioning, behavior and potential for risk  - Engage patient in 1 on 1 interactions  - Encourage patient to express fears, feelings, frustrations, and discuss symptoms    - [place] patient to reality, help patient recognize reality-based thinking   - Administer medications as ordered and assess for potential side effects  - Provide the patient education related to the signs and symptoms of the illness and desired effects of prescribed medications  Outcome: Adequate for Discharge  Goal: Refrain from acting on delusional thinking/internal stimuli  Description: Interventions:  - Monitor patient closely, per order   - Utilize least restrictive measures   - Set reasonable limits, give positive feedback for acceptable   - Administer medications as ordered and monitor of potential side effects  Outcome: Adequate for Discharge  Goal: Agree to be compliant with medication regime, as prescribed and report medication side effects  Description: Interventions:  - Offer appropriate PRN medication and supervise ingestion; conduct AIMS, as needed   Outcome: Adequate for Discharge  Goal: Attend and participate in unit activities, including therapeutic, recreational, and educational groups  Description: Interventions:  -Encourage Visitation and family involvement in care  Outcome: Adequate for Discharge  Goal: Recognize dysfunctional thoughts, communicate reality-based thoughts at the time of discharge  Description: Interventions:  - Provide medication and psycho-education to assist patient in compliance and developing insight into his/her illness   Outcome: Adequate for Discharge  Goal: Complete daily ADLs, including personal hygiene independently, as able  Description: Interventions:  - Observe, teach, and assist patient with ADLS  - Monitor and promote a balance of rest/activity, with adequate nutrition and elimination   Outcome: Adequate for Discharge     Problem: Ineffective Coping  Goal: Identifies ineffective coping skills  Outcome: Adequate for Discharge  Goal: Identifies healthy coping skills  Outcome: Adequate for Discharge  Goal: Demonstrates healthy coping skills  Outcome: Adequate for Discharge  Goal: Participates in unit activities  Description: Interventions:  - Provide therapeutic environment   - Provide required programming   - Redirect inappropriate behaviors   Outcome: Adequate for Discharge  Goal: Patient/Family participate in treatment and DC plans  Description: Interventions:  - Provide therapeutic environment  Outcome: Adequate for Discharge  Goal: Patient/Family verbalizes awareness of resources  Outcome: Adequate for Discharge  Goal: Understands least restrictive measures  Description: Interventions:  - Utilize least restrictive behavior  Outcome: Adequate for Discharge  Goal: Free from restraint events  Description: - Utilize least restrictive measures   - Provide behavioral interventions   - Redirect inappropriate behaviors   Outcome: Adequate for Discharge     Problem: Risk for Self Injury/Neglect  Goal: Treatment Goal: Remain safe during length of stay, learn and adopt new coping skills, and be free of self-injurious ideation, impulses and acts at the time of discharge  Outcome: Adequate for Discharge  Goal: Verbalize thoughts and feelings  Description: Interventions:  - Assess and re-assess patient's lethality and potential for self-injury  - Engage patient in 1:1 interactions, daily, for a minimum of 15 minutes  - Encourage patient to express feelings, fears, frustrations, hopes  - Establish rapport/trust with patient   Outcome: Adequate for Discharge  Goal: Refrain from harming self  Description: Interventions:  - Monitor patient closely, per order  - Develop a trusting relationship  - Supervise medication ingestion, monitor effects and side effects   Outcome: Adequate for Discharge  Goal: Attend and participate in unit activities, including therapeutic, recreational, and educational groups  Description: Interventions:  - Provide therapeutic and educational activities daily, encourage attendance and participation, and document same in the medical record  - Obtain collateral information, encourage visitation and family involvement in care   Outcome: Adequate for Discharge  Goal: Recognize maladaptive responses and adopt new coping mechanisms  Outcome: Adequate for Discharge  Goal: Complete daily ADLs, including personal hygiene independently, as able  Description: Interventions:  - Observe, teach, and assist patient with ADLS  - Monitor and promote a balance of rest/activity, with adequate nutrition and elimination  Outcome: Adequate for Discharge     Problem: Depression  Goal: Treatment Goal: Demonstrate behavioral control of depressive symptoms, verbalize feelings of improved mood/affect, and adopt new coping skills prior to discharge  Outcome: Adequate for Discharge  Goal: Verbalize thoughts and feelings  Description: Interventions:  - Assess and re-assess patient's level of risk   - Engage patient in 1:1 interactions, daily, for a minimum of 15 minutes   - Encourage patient to express feelings, fears, frustrations, hopes   Outcome: Adequate for Discharge  Goal: Refrain from harming self  Description: Interventions:  - Monitor patient closely, per order   - Supervise medication ingestion, monitor effects and side effects   Outcome: Adequate for Discharge  Goal: Refrain from isolation  Description: Interventions:  - Develop a trusting relationship   - Encourage socialization   Outcome: Adequate for Discharge  Goal: Refrain from self-neglect  Outcome: Adequate for Discharge  Goal: Attend and participate in unit activities, including therapeutic, recreational, and educational groups  Description: Interventions:  - Provide therapeutic and educational activities daily, encourage attendance and participation, and document same in the medical record   Outcome: Adequate for Discharge  Goal: Complete daily ADLs, including personal hygiene independently, as able  Description: Interventions:  - Observe, teach, and assist patient with ADLS  -  Monitor and promote a balance of rest/activity, with adequate nutrition and elimination   Outcome: Adequate for Discharge     Problem: Anxiety  Goal: Anxiety is at manageable level  Description: Interventions:  - Assess and monitor patient's anxiety level. - Monitor for signs and symptoms (heart palpitations, chest pain, shortness of breath, headaches, nausea, feeling jumpy, restlessness, irritable, apprehensive). - Collaborate with interdisciplinary team and initiate plan and interventions as ordered.   - [place] patient to unit/surroundings  - Explain treatment plan  - Encourage participation in care  - Encourage verbalization of concerns/fears  - Identify coping mechanisms  - Assist in developing anxiety-reducing skills  - Administer/offer alternative therapies  - Limit or eliminate stimulants  Outcome: Adequate for Discharge     Problem: Risk for Violence/Aggression Toward Others  Goal: Verbalize thoughts and feelings  Description: Interventions:  - Assess and re-assess patient's level of risk, every waking shift  - Engage patient in 1:1 interactions, daily, for a minimum of 15 minutes   - Allow patient to express feelings and frustrations in a safe and non-threatening manner   - Establish rapport/trust with patient   Outcome: Adequate for Discharge  Goal: Refrain from harming others  Outcome: Adequate for Discharge  Goal: Refrain from destructive acts on the environment or property  Outcome: Adequate for Discharge  Goal: Control angry outbursts  Description: Interventions:  - Monitor patient closely, per order  - Ensure early verbal de-escalation  - Monitor prn medication needs  - Set reasonable/therapeutic limits, outline behavioral expectations, and consequences   - Provide a non-threatening milieu, utilizing the least restrictive interventions   Outcome: Adequate for Discharge  Goal: Attend and participate in unit activities, including therapeutic, recreational, and educational groups  Description: Interventions:  - Provide therapeutic and educational activities daily, encourage attendance and participation, and document same in the medical record   Outcome: Adequate for Discharge  Goal: Identify appropriate positive anger management techniques  Description: Interventions:  - Offer anger management and coping skills groups   - Staff will provide positive feedback for appropriate anger control  Outcome: Adequate for Discharge     Problem: Alteration in Orientation  Goal: Treatment Goal: Demonstrate a reduction of confusion and improved orientation to person, place, time and/or situation upon discharge, according to optimum baseline/ability  Outcome: Adequate for Discharge  Goal: Interact with staff daily  Description: Interventions:  - Assess and re-assess patient's level of orientation  - Engage patient in 1 on 1 interactions, daily, for a minimum of 15 minutes   - Establish rapport/trust with patient   Outcome: Adequate for Discharge  Goal: Express concerns related to confused thinking related to:  Description: Interventions:  - Encourage patient to express feelings, fears, frustrations, hopes  - Assign consistent caregivers   - [place]/re-orient patient as needed  - Allow comfort items, as appropriate  - Provide visual cues, signs, etc.   Outcome: Adequate for Discharge  Goal: Allow medical examinations, as recommended  Description: Interventions:  - Provide physical/neurological exams and/or referrals, per provider   Outcome: Adequate for Discharge  Goal: Cooperate with recommended testing/procedures  Description: Interventions:  - Determine need for ancillary testing  - Observe for mental status changes  - Implement falls/precaution protocol   Outcome: Adequate for Discharge  Goal: Attend and participate in unit activities, including therapeutic, recreational, and educational groups  Description: Interventions:  - Provide therapeutic and educational activities daily, encourage attendance and participation, and document same in the medical record   - Provide appropriate opportunities for reminiscence   - Provide a consistent daily routine   - Encourage family contact/visitation   Outcome: Adequate for Discharge  Goal: Complete daily ADLs, including personal hygiene independently, as able  Description: Interventions:  - Observe, teach, and assist patient with ADLS  Outcome: Adequate for Discharge
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
## 2023-08-15 NOTE — NURSING NOTE
Patient is calm, visible, and social. Patient is d/c home today, denies SI, AH, VH. Patient reports no anxiety and no depression. Patient is medication compliant, and cooperative. Patient offers no c/o on that shift. Medication and F/U appt reviewed with patient. Patient verbalizes understanding. Patient left unit with all belongings to lobby to meet with patient.

## 2023-08-15 NOTE — PROGRESS NOTES
08/15/23 0808   Team Meeting   Meeting Type Daily Rounds   Initial Conference Date 08/15/23   Team Members Present   Team Members Present Physician;Nurse;; ;Occupational Therapist   Physician Team Member [id number] [facility] Team Member [facility] Management Team Member [facility] [address] Work Team Member Tamela   OT Team Member Carol Ham   Patient/Family Present   Patient Present No   Patient's Family Present No     Patient is set for discharge today at 11am.  will be picking her up.

## 2023-08-15 NOTE — BH TRANSITION RECORD
Contact Information: If you have any questions, concerns, pended studies, tests and/or procedures, or emergencies regarding your inpatient behavioral health visit. Please contact [facility] older adult behavioral health unit 6T [phone] and ask to speak to a , nurse or physician. A contact is available 24 hours/ 7 days a week at this number. Summary of Procedures Performed During your Stay:  Below is a list of major procedures performed during your hospital stay and a summary of results:  - Cardiac Procedures/Studies: EKG. QTC is 437    Pending Studies (From admission, onward)    None        Please follow up on the above pending studies with your PCP and/or referring provider.

## 2023-08-15 NOTE — PROGRESS NOTES
08/15/23 1015   Activity/Group Checklist   Group Community meeting  (topic self affirmations/relaxation approaches.)   Attendance Attended   Attendance Duration (min) 31-45   Interactions Interacted appropriately  (Pt.discussed postive self talk approaches including reflecting on what someone has learned once they are in a session of self care. Pt. looking forward to today's D/C.)   Affect/Mood Appropriate;Calm;Normal range   Goals Achieved Able to engage in interactions; Discussed coping strategies

## 2023-08-15 NOTE — NURSING NOTE
Patient continues to complain of pain from a headache. Asked for Tylenol but reminded that she just had Tylenol 2 hours ago. Fioricet offered and administered at 2004. Patient then berated this writer for "making me wait" and stated "waiting gave me heart palpitations." Patient requested and was provided with an ice pack for her head.

## 2023-08-15 NOTE — PLAN OF CARE
Problem: DISCHARGE PLANNING  Goal: Discharge to home or other facility with appropriate resources  Description: INTERVENTIONS:  - Identify barriers to discharge w/patient and caregiver  - Arrange for needed discharge resources and transportation as appropriate  - Identify discharge learning needs (meds, wound care, etc.)  - Arrange for interpretive services to assist at discharge as needed  - Refer to Case Management Department for coordinating discharge planning if the patient needs post-hospital services based on physician/advanced practitioner order or complex needs related to functional status, cognitive ability, or social support system  Outcome: Completed  You are being discharged at 11:00am with your  Ryan Dalton set to pick you up. You have a follow up appointment with your Primary Care Provider on August 17th at 8:20am, your Therapist via telehealth at [address] [place] [address] on Thursday, September 21st at [facility], your psychiatrist  on Thursday, August 24th at 1:20pm via telehealth, and your  Allergist on Monday, April 29th at 9:15 AM. Ronda Olivo were also added to the cancellation list for that appointment. Your medications have been called in to your primary pharmacy as requested.

## 2023-08-16 ENCOUNTER — PATIENT MESSAGE (OUTPATIENT)
Dept: GASTROENTEROLOGY | Facility: CLINIC | Age: 48
End: 2023-08-16

## 2023-08-17 ENCOUNTER — TELEPHONE (OUTPATIENT)
Dept: FAMILY MEDICINE CLINIC | Facility: CLINIC | Age: 48
End: 2023-08-17

## 2023-08-17 ENCOUNTER — OFFICE VISIT (OUTPATIENT)
Dept: FAMILY MEDICINE CLINIC | Facility: CLINIC | Age: 48
End: 2023-08-17
Payer: COMMERCIAL

## 2023-08-17 VITALS
BODY MASS INDEX: 24.66 KG/M2 | TEMPERATURE: 98.2 F | RESPIRATION RATE: 18 BRPM | DIASTOLIC BLOOD PRESSURE: 80 MMHG | WEIGHT: 148 LBS | SYSTOLIC BLOOD PRESSURE: 124 MMHG | OXYGEN SATURATION: 99 % | HEIGHT: 65 IN | HEART RATE: 104 BPM

## 2023-08-17 DIAGNOSIS — L50.1 CHRONIC IDIOPATHIC URTICARIA: ICD-10-CM

## 2023-08-17 DIAGNOSIS — R56.9 SEIZURE-LIKE ACTIVITY (HCC): ICD-10-CM

## 2023-08-17 DIAGNOSIS — D89.40 MAST CELL ACTIVATION SYNDROME (HCC): ICD-10-CM

## 2023-08-17 DIAGNOSIS — R11.10 VOMITING AND DIARRHEA: ICD-10-CM

## 2023-08-17 DIAGNOSIS — R63.0 ANOREXIA: Primary | ICD-10-CM

## 2023-08-17 DIAGNOSIS — E44.1 MILD PROTEIN-CALORIE MALNUTRITION (HCC): ICD-10-CM

## 2023-08-17 DIAGNOSIS — F32.3 CURRENT SEVERE EPISODE OF MAJOR DEPRESSIVE DISORDER WITH PSYCHOTIC FEATURES WITHOUT PRIOR EPISODE (HCC): ICD-10-CM

## 2023-08-17 DIAGNOSIS — R19.7 VOMITING AND DIARRHEA: ICD-10-CM

## 2023-08-17 DIAGNOSIS — R45.851 SUICIDAL THOUGHTS: ICD-10-CM

## 2023-08-17 DIAGNOSIS — G93.40 ACUTE ENCEPHALOPATHY: Primary | ICD-10-CM

## 2023-08-17 PROCEDURE — 99496 TRANSJ CARE MGMT HIGH F2F 7D: CPT | Performed by: FAMILY MEDICINE

## 2023-08-17 RX ORDER — MELATONIN 200 MCG
TABLET ORAL
COMMUNITY
Start: 2023-08-15

## 2023-08-17 RX ORDER — LORAZEPAM 2 MG/ML
1 INJECTION INTRAMUSCULAR ONCE AS NEEDED
Status: CANCELLED
Start: 2023-08-21

## 2023-08-17 RX ORDER — DICLOFENAC SODIUM 75 MG/1
TABLET, DELAYED RELEASE ORAL
COMMUNITY
Start: 2023-07-14

## 2023-08-17 RX ORDER — EPINEPHRINE 1 MG/ML
0.3 INJECTION, SOLUTION, CONCENTRATE INTRAVENOUS
Status: CANCELLED | OUTPATIENT
Start: 2023-08-21

## 2023-08-17 RX ORDER — PANTOPRAZOLE SODIUM 40 MG/10ML
40 INJECTION, POWDER, LYOPHILIZED, FOR SOLUTION INTRAVENOUS ONCE
Status: CANCELLED
Start: 2023-08-21 | End: 2023-08-21

## 2023-08-17 RX ORDER — METHYLPREDNISOLONE SODIUM SUCCINATE 40 MG/ML
60 INJECTION, POWDER, LYOPHILIZED, FOR SOLUTION INTRAMUSCULAR; INTRAVENOUS ONCE
Status: CANCELLED
Start: 2023-08-21 | End: 2023-08-21

## 2023-08-17 NOTE — LETTER
August 17, 2023     Patient: Fiona Cross  YOB: 1975  Date of Visit: 8/17/2023      To Whom it May Concern:    Mariah Campos is under my professional care. Nathaly Reyes was seen in my office on 8/17/2023. At this time, Nathaly Reyes may receive acupuncture treatments per the acupuncturists discretion. If you have any questions or concerns, please don't hesitate to call.          Sincerely,          Paz Leung, DO

## 2023-08-17 NOTE — TELEPHONE ENCOUNTER
Hi, Doctor. Sorry, this message is for Doctor Vladimir Le. This is Emeli Ortega. I was in to see him earlier this morning. I was wondering if he could write me a letter so that I could go get acupuncture for the issue with the pain since the bladder tumor was removed. Please give me a call if there's any questions on what they need. They just need to know that I had a tumor, that my doctor's aware, my family doctor is aware of it and that, you know, it's OK to go have acupuncture done. So if you have any questions, my phone number is [phone]. Again, Carlitos Abreu, first date is [birth year], phone number is [phone].  Thank

## 2023-08-17 NOTE — PROGRESS NOTES
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 She should continue her current medications at this time. We will plan to restart her Xolair injections. Advised to keep appointments with her specialist that are upcoming. I encouraged her to attend the outpatient mental health counseling and follow-up with psychiatry. Continue to avoid benzodiazepine use. No other changes at this time. Patient is in agreement with plan. She should follow-up in the next 4 weeks. Subjective     Patient Id: Francisco Ventura is a 46 y.o. female    HPI    TCM visit today after prolonged hospitalization from 7/18/23 - 8/15/23 for AMS, seizure-like activity, MDD with passive suicidal thoughts, benzodiazepine abuse. Initially admitted to [facility] and subsequently transferred to [facility] inpatient psych unit. She was ultimately stabilized and discharged home. Today, she is overall feeling well. She has no complaints or concerns today. She does have follow-up scheduled with her specialists. She is planning to start a 2-week outpatient mental health counseling program and does have follow-up with her psychiatrist.  She is not having any more suicidal ideation. No more ongoing benzodiazepine use. Has a follow-up plan for her Crohn's disease with her gastroenterologist.  She also is scheduled to see her new allergist at [facility] upcoming as well in September.   No other concerns at this time.    Review of Systems   Constitutional: Negative for chills and fever. Respiratory: Negative for shortness of breath. Cardiovascular: Negative for chest pain. Gastrointestinal: Negative for abdominal pain, constipation, diarrhea, nausea and vomiting. Genitourinary: Negative for dysuria. Musculoskeletal: Negative for arthralgias and myalgias. Skin: Negative for rash. Neurological: Negative for headaches. Psychiatric/Behavioral: Negative for agitation, confusion, dysphoric mood, hallucinations, self-injury, sleep disturbance and suicidal ideas. The patient is not nervous/anxious. All other systems reviewed and are negative.       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 pt denies, documented in cardio note   • Pancreatitis     sphinger of    • Psychiatric disorder    • RA (rheumatoid arthritis) ([address])    • Seizures ([address])    • Ulcerative colitis ([address])    • Visual impairment        Past Surgical History:   Procedure Laterality Date   • ABDOMINAL SURGERY  2017   • APPENDECTOMY     • CHOLECYSTECTOMY     • COLONOSCOPY  2019   • CYSTOSCOPY N/A 6/8/2023    Procedure: CYSTOSCOPY, mini TURBT with fulgeration;  Surgeon: Calixto Chery MD;  Location: [facility];  Service: Urology   • EGD AND COLONOSCOPY N/A 09/12/2017    Procedure: EGD AND COLONOSCOPY;  Surgeon: Eduar Milan MD;  Location: [facility]; Service: Gastroenterology   • ERCP N/A 09/15/2017    Procedure: ENDOSCOPIC RETROGRADE CHOLANGIOPANCREATOGRAPHY (ERCP); Surgeon: Wilder Cuba MD;  Location: [facility];   Service: Gastroenterology   • HYSTERECTOMY     • KNEE SURGERY Right    • LAPAROSCOPIC ENDOMETRIOSIS FULGURATION     • ORIF ANKLE FRACTURE Left    • MA ARTHRS KNE SURG W/MENISCECTOMY MED/LAT W/SHVG Left 05/12/2017    Procedure: POSSIBLE MEDIAL MENISECTOMY;  Surgeon: Kimmy Louis MD;  Location: [facility];  Service: Orthopedics   • MA ARTHRS KNEE DEBRIDEMENT/SHAVING ARTCLR CRTLG Left 05/12/2017    Procedure: KNEE ARTHROSCOPY EVALUATION, CHONDROPLASTY;  Surgeon: Kimmy Louis MD;  Location: [facility];  Service: Orthopedics   • MA LAPS ABD PRTM&OMENTUM DX W/WO [facility] BR/WA [facility] N/A 12/12/2017    Procedure: LAPAROSCOPY DIAGNOSTIC  WITH LYSIS OF ADHESIONS;  Surgeon: Cecelia Doss DO;  Location:  MAIN OR;  Service: General   • UPPER GASTROINTESTINAL ENDOSCOPY  2019       Family History   Problem Relation Age of Onset   • Ulcerative colitis Mother    • Arthritis Mother    • Colon polyps Mother    • Heart failure Father    • Neuropathy Father    • Macular degeneration Father    • Diabetes Father    • Early death Father    • Vision loss Father    • Asthma Daughter    • Hypothyroidism Daughter    • Heart disease Maternal Grandmother    • Arthritis Maternal Grandmother    • No Known Problems Maternal Grandfather    • Arthritis Paternal Grandmother    • Macular degeneration Paternal Grandmother    • Vision loss Paternal Grandmother    • Lymphoma Paternal Grandfather    • Cancer Paternal Grandfather    • Early death Paternal Grandfather    • Breast cancer Maternal Aunt    • Cancer Maternal Aunt    • Miscarriages / Stillbirths Maternal Aunt    • Heart failure Paternal Aunt    • Heart failure Paternal Aunt    • Irritable bowel syndrome Paternal Aunt    • Breast cancer Paternal Aunt    • Breast cancer additional onset Paternal Aunt    • Hypothyroidism Paternal Aunt • Cancer Paternal Aunt    • No Known Problems Son    • No Known Problems Son    • Kidney disease Brother    • Depression Paternal Uncle    • Early death Paternal Uncle        Social History     Socioeconomic History   • Marital status: /Civil Union     Spouse name: None   • Number of children: None   • Years of education: None   • Highest education level: None   Occupational History   • None   Tobacco Use   • Smoking status: Never   • Smokeless tobacco: Never   Vaping Use   • Vaping Use: Never used   Substance and Sexual Activity   • Alcohol use: Never   • Drug use: Never   • Sexual activity: Not Currently     Partners: Male     Birth control/protection: Other     Comment: Full hysterectomy   Other Topics Concern   • None   Social History Narrative   • None     Social Determinants of Health     Financial Resource Strain: Low Risk  (2/6/2023)    Overall Financial Resource Strain (CARDIA)    • Difficulty of Paying Living Expenses: Not very hard   Food Insecurity: No Food Insecurity (7/19/2023)    Hunger Vital Sign    • Worried About Running Out of Food in the Last Year: Never true    • Ran Out of Food in the Last Year: Never true   Transportation Needs: No Transportation Needs (7/19/2023)    PRAPARE - Transportation    • Lack of Transportation (Medical): No    • Lack of Transportation (Non-Medical):  No   Physical Activity: Not on file   Stress: Not on file   Social Connections: Not on file   Intimate Partner Violence: Not on file   Housing Stability: Low Risk  (7/19/2023)    Housing Stability Vital Sign    • Unable to Pay for Housing in the Last Year: No    • Number of Places Lived in the Last Year: 1    • Unstable Housing in the Last Year: No       Allergies   Allergen Reactions   • Aimovig [Erenumab-Aooe] Anaphylaxis   • Amitriptyline Anaphylaxis     According to the patient she had nphylaxis   • Aspergillus Allergy Skin Test Other (See Comments), Hives and Swelling   • Azathioprine Other (See Comments) and Anaphylaxis     pancreatitis  According to patient she needed Epipen   • Banana - Food Allergy Itching, Swelling and Anaphylaxis   • Buspar [Buspirone] Hives and Shortness Of Breath   • Citric Acid-Polysorbate 80 Abdominal Pain, Anaphylaxis, Anxiety, Bradycardia, Diarrhea, Fatigue, GI Intolerance, Headache, Hives, Hyperactivity, Itching, Lip Swelling, Nausea Only, Palpitations, Rash, Shortness Of Breath, Tachycardia, Throat Swelling, Tongue Swelling and Vomiting   • Corn Dextrin Anaphylaxis     Any corn based products   • Eggs Or Egg-Derived Products - Food Allergy Anaphylaxis   • Epinephrine Anaphylaxis   • Erenumab Anaphylaxis     patient sent portal message stating she had anaphylaxis  patient sent portal message stating she had anaphylaxis     • Gadolinium Abdominal Pain, Anaphylaxis, Anxiety, Confusion, Delirium, Dizziness, Eye Swelling, Fatigue, GI Intolerance, Headache, Hives, Irritability, Itching, Lip Swelling, Nausea Only, Palpitations, Photosensitivity, Rash, Seizures, Shortness Of Breath, Swelling, Syncope, Throat Swelling, Tongue Swelling, Tremor, Visual Disturbance and Vomiting   • Gelatin - Food Allergy Anaphylaxis   • Heparin Hives     Painful welts    • Lactated Ringers Shortness Of Breath     Pt questions this allergy, pt has received LR multiple times without reaction   • Lavender Oil Anaphylaxis     Other reaction(s): anaphalxis   • Malto Dextrin [Dextrin] Anaphylaxis   • Other Anaphylaxis     Gel caps, maltodextrose, dextrose,grapes, lavender    • Penicillium Notatum Shortness Of Breath and Swelling   • Red Dye - Food Allergy Other (See Comments) and Anaphylaxis     intolerance   • Sumatriptan Anaphylaxis     Bradycardia, sob, back pressure, head pain,throat tightness  According to the patient she had anphylaxis   • Ustekinumab Anaphylaxis   • Contrast [Iodinated Contrast Media] Other (See Comments)     Pt states needs to be premedicated prior to injection   • Corn Oil - Food Allergy - Food Allergy Hives   • Humira [Adalimumab] Other (See Comments)     "I develop drug induced lupus"   • Ibuprofen Hives and Throat Swelling   • Polyethylene Glycol Diarrhea and Vomiting   • Remicade [Infliximab] Other (See Comments)     "I develop drug induced lupus"   • Soy Allergy - Food Allergy Other (See Comments)   • Sulfa Antibiotics Hives and Other (See Comments)   • Sulfate Hives   • Sulfites - Food Allergy Hives   • Sweet Corn Extract Skin Test - Food Allergy Hives and Itching   • Chlorhexidine Itching   • Freederm Adhesive Remover [New Skin] Rash   • Histamine Hives, Rash and Other (See Comments)     Intolerance           Current Outpatient Medications:   •  ARIPiprazole (ABILIFY) 10 mg tablet, Take 1 tablet (10 mg total) by mouth daily Do not start before August 16, 2023., Disp: 30 tablet, Rfl: 0  •  B-D 3CC LUER-SHAMEKA SYR 25GX1" 25G X 1" 3 ML MISC, , Disp: , Rfl:   •  butalbital-acetaminophen-caffeine (FIORICET,ESGIC) -40 mg per tablet, Take 1 tablet by mouth every 8 (eight) hours as needed for migraine, Disp: 20 tablet, Rfl: 1  •  cetirizine (ZyrTEC) 10 mg tablet, Take 20 mg by mouth daily, Disp: , Rfl:   •  Cholecalciferol 10 MCG /0.025ML LIQD, Take 1,000 Units by mouth 2 (two) times a day, Disp: 750 mL, Rfl: 1  •  diclofenac (VOLTAREN) 75 mg EC tablet, , Disp: , Rfl:   •  EpiPen 2-Malachi 0.3 MG/0.3ML SOAJ, INJECT 1 PEN INTO THE MUSCLE AS NEEDED FOR ANAPHALAXIS, Disp: , Rfl:   •  famotidine (PEPCID) 40 MG tablet, Take 0.5 tablets (20 mg total) by mouth daily, Disp: 90 tablet, Rfl: 0  •  hydrOXYzine HCL (ATARAX) 25 mg tablet, , Disp: , Rfl:   •  Lactobacillus (PROBIOTIC ACIDOPHILUS PO), Take by mouth, Disp: , Rfl:   •  Lactobacillus-Inulin ([facility]) CAPS, Take 200 mg by mouth daily, Disp: , Rfl:   •  levalbuterol (XOPENEX HFA) 45 mcg/act inhaler, Inhale 1-2 puffs every 4 (four) hours as needed for shortness of breath, Disp: 45 g, Rfl: 2  •  levothyroxine 100 mcg tablet, Take 100 mcg by mouth every other day, Disp: , Rfl:   •  liothyronine (CYTOMEL) 5 mcg tablet, Take 1 tablet (5 mcg total) by mouth daily Do not start before June 14, 2023., Disp: 30 tablet, Rfl: 0  •  melatonin 3 mg, Take 1 tablet (3 mg total) by mouth daily at bedtime, Disp: 30 tablet, Rfl: 0  •  montelukast (SINGULAIR) 10 mg tablet, Take 1 tablet (10 mg total) by mouth daily at bedtime, Disp: , Rfl: 0  •  NON FORMULARY, immunoglobin sq 6g 30 ml once a week, Disp: , Rfl:   •  nystatin (MYCOSTATIN) 500,000 units/5 mL suspension, Swish and spit 5 mL (500,000 Units total) 4 (four) times a day, Disp: 473 mL, Rfl: 0  •  omalizumab (XOLAIR) 150 mg, Inject 2.4 mL (300 mg total) under the skin every 28 days, Disp: 1 each, Rfl: 2  •  pantoprazole (PROTONIX) 40 mg tablet, TAKE ONE TABLET BY MOUTH TWICE A DAY, Disp: 60 tablet, Rfl: 5  •  predniSONE 20 mg tablet, Take 1 tablet (20 mg total) by mouth daily Take as instructed by physician. 20 mg three times a day for 3 days, then 20 mg twice daily, Disp: 60 tablet, Rfl: 0  •  sertraline (ZOLOFT) 100 mg tablet, Take 1.5 tablets (150 mg total) by mouth daily Do not start before August 16, 2023., Disp: 45 tablet, Rfl: 0  •  TechLite Lancets MISC, , Disp: , Rfl:   •  traMADol (Ultram) 50 mg tablet, Take 1 tablet (50 mg total) by mouth every 12 (twelve) hours as needed for moderate pain or severe pain, Disp: 60 tablet, Rfl: 0  •  traZODone (DESYREL) 50 mg tablet, Take 1 tablet (50 mg total) by mouth daily at bedtime, Disp: 30 tablet, Rfl: 0  •  Melatonin 3 MG SUBL, , Disp: , Rfl:     Objective     Vitals:    08/17/23 0815   BP: 124/80   Pulse: 104   Resp: 18   Temp: 98.2 °F (36.8 °C)   SpO2: 99%   Weight: 67.1 kg (148 lb)   Height: 5' 5" (1.651 m)       Physical Exam  Vitals and nursing note reviewed. Constitutional:       General: She is not in acute distress. Appearance: She is well-developed. HENT:      Head: Normocephalic and atraumatic.       Right Ear: Tympanic membrane, ear canal and external ear normal.      Left Ear: Tympanic membrane, ear canal and external ear normal.      Nose: Nose normal.      Mouth/Throat:      Mouth: Mucous membranes are moist.      Pharynx: Oropharynx is clear. Posterior oropharyngeal erythema present. No oropharyngeal exudate. Eyes:      Extraocular Movements: Extraocular movements intact. Conjunctiva/sclera: Conjunctivae normal.      Pupils: Pupils are equal, round, and reactive to light. Neck:      Thyroid: No thyromegaly. Cardiovascular:      Rate and Rhythm: Regular rhythm. Tachycardia present. Heart sounds: Normal heart sounds. No murmur heard. Pulmonary:      Effort: Pulmonary effort is normal. No respiratory distress. Breath sounds: Normal breath sounds. No wheezing. Genitourinary:     Comments: Exam deferred  Musculoskeletal:      Cervical back: Neck supple. Right lower leg: No edema. Left lower leg: No edema. Skin:     General: Skin is warm. Neurological:      General: No focal deficit present. Mental Status: She is alert and oriented to person, place, and time. Psychiatric:         Mood and Affect: Mood normal.         Speech: Speech normal.         Behavior: Behavior normal. Behavior is cooperative. Thought Content: Thought content normal. Thought content does not include homicidal or suicidal ideation.          Cognition and Memory: Cognition normal.         Judgment: Judgment normal.         Dave Stoddard, [address] [facility],  [address]

## 2023-08-18 ENCOUNTER — OFFICE VISIT (OUTPATIENT)
Dept: PSYCHOLOGY | Facility: CLINIC | Age: 48
End: 2023-08-18
Payer: COMMERCIAL

## 2023-08-18 ENCOUNTER — OFFICE VISIT (OUTPATIENT)
Dept: PSYCHIATRY | Facility: CLINIC | Age: 48
End: 2023-08-18
Payer: COMMERCIAL

## 2023-08-18 ENCOUNTER — TELEPHONE (OUTPATIENT)
Dept: GASTROENTEROLOGY | Facility: CLINIC | Age: 48
End: 2023-08-18

## 2023-08-18 DIAGNOSIS — F41.1 GENERALIZED ANXIETY DISORDER: Primary | Chronic | ICD-10-CM

## 2023-08-18 DIAGNOSIS — F33.41 RECURRENT MAJOR DEPRESSIVE DISORDER, IN PARTIAL REMISSION (HCC): ICD-10-CM

## 2023-08-18 PROCEDURE — S0201 PARTIAL HOSPITALIZATION SERV: HCPCS

## 2023-08-18 PROCEDURE — G0410 GRP PSYCH PARTIAL HOSP 45-50: HCPCS

## 2023-08-18 PROCEDURE — 99215 OFFICE O/P EST HI 40 MIN: CPT | Performed by: STUDENT IN AN ORGANIZED HEALTH CARE EDUCATION/TRAINING PROGRAM

## 2023-08-18 PROCEDURE — G0176 OPPS/PHP;ACTIVITY THERAPY: HCPCS

## 2023-08-18 PROCEDURE — 90791 PSYCH DIAGNOSTIC EVALUATION: CPT

## 2023-08-18 PROCEDURE — 90832 PSYTX W PT 30 MINUTES: CPT

## 2023-08-18 NOTE — PSYCH
Visit Time     Visit Start Time: 9941  Visit Stop Time: 3678  Total Visit Duration: 60 minutes     Subjective:      Patient ID: Jailyn Jara is a 46 y.o. y.o.  female     [facility] Clinical Progress Notes       Specialized Services Documentation  Therapist must complete separate progress note for each specific clinical activity in which the individual participated during the day. Group Psychotherapy   Jailyn Jara actively shared in open processing group exploring being in the present. Jailyn Jara shared motivation for being in program.  She offered supportive feedback to peers and accepted feedback as well. She identified a success this week was being able to continue some of the self care routines she started in the hospital.  Group practiced mindfulness task as well as using a calendar as a tool to encourage routine and accountability. Some beginning work toward treatment objective. Continue psychotherapy to allow for self reflection and sharing.  Tx Plan Objective: 1.1, Therapist:  Zachary SCHOFIELD

## 2023-08-18 NOTE — PSYCH
Subjective:     Patient ID: Lucas Cha is a 46 y.o. female. [facility] Clinical Progress Notes      Specialized Services Documentation  Therapist must complete separate progress note for each specific clinical activity in which the individual participated during the day. Other according to benefits information sheet uploaded on 8/17/23 no Judith Radha is needed    Case Management Note    EMMA Barnard, LSW    Current suicide risk : Low       [id number]  Met with Lucas Cha . Reviewed program, initial paperwork reviewed: Consent for Treatment, PHP handbook, HIPPA, General Consent, Client Bill of Rights, and Smoking/Drug and Alcohol Policy. Release of Information obtained for emergency contact - Lavinia Barcenasclarisa () [phone] and PCP and Health Care Coordination Form. Lucas Cha has hard copies of all paperwork and verbally gave consent. Reviewed and given on call number. PCP notified of admission and health care coordination form sent. Completed initial psycho-social evaluation and initial treatment goals discussed. Medications changes/added/denied? No - See Dr. Kelly Gonzales Note    Treatment session number: Assessment and day 1    Individual Case Management Visit provided today?  yes    [facility] follow up physician's orders: [facility] [place] - See Dr. Kelly Gonzales Note

## 2023-08-18 NOTE — PSYCH
Visit Time    Visit Start Time: 0930  Visit Stop Time: 4387  Total Visit Duration: 60 minutes    Subjective:     Patient ID: Emeli Cervantes is a 46 y.o. female. [facility] Clinical Progress Notes      Specialized Services Documentation  Therapist must complete separate progress note for each specific clinical activity in which the individual participated during the day. Group Psychotherapy - Wellness Assessment  This group was facilitated face to face in a private office setting using HIPAA compliant protocols. Emeli Cervantes did not attend group due to intake. Tx Plan Objective:n/a Therapist: PABLO [facility] [place]      Education Therapy   [id number] Emeli Cervantes did not attend due to intake. [address] engaged throughout the treatment day. Was engaged in learning related to Illness, Medication, Aftercare, and Wellness Tools. Staff utilized Verbal, Written, A/V, and Demonstration teaching methods. Emeli Cervantes shared area of learning and set a goal for outside of program to have a conversation with her .       Tx Plan Objective: n/a Therapist: PABLO [facility] [place]

## 2023-08-18 NOTE — BH TREATMENT PLAN
Assessment/Plan:      Diagnoses and all orders for this visit:    Generalized anxiety disorder    Recurrent major depressive disorder, in partial remission ([address])          Subjective:     Patient ID: Peterson Chin is a 46 y.o. female. [facility] Treatment Plan   AREAS OF NEED: Generalized anxiety disorder and recurrent major depressive disorder in partial remission as evidenced by nervousness, restlessness, difficulties with concentration , can visualize things but can not physically do them, racing and ruminating thoughts, difficulties falling and staying asleep, crying due to health issues and relationship strain with mother. Date Initiated: 08/18/23    Strengths: "connect with people, stubborn, kind, determined, organized, help people, well prepared, good in crisis mode, good under pressure"    LONG TERM GOAL:   Date Initiated: 08/18/23  1.0 I will identify and share three ways in which my day-to-day functioning has improved since attending program.  Target Date: 9/15/23  Completion Date:       SHORT TERM OBJECTIVES:     Date Initiated: 08/18/23  1.1 I will increase limit setting and boundaries by implementing assertiveness skills into my weekly routine in order to meet my own needs and build confidence. Revision Date:   Target Date: 8/29/23  Completion Date:     Date Initiated: 08/18/23  1.2 I will create a list identifying coping skills that I have learned and discuss how I plan to build them into my schedule. I will choose at least 1 different coping skill daily to practice   Revision Date:   Target Date: 8/29/23  Completion Date:    Date Initiated: 08/18/23  1.3 I will take medications as prescribed and share questions and concerns if arise. Revision Date:  Target Date: 8/29/23  Completion Date:     Date Initiated: 08/18/23  1.4 I will identify 3 ways my supports can assist in my recovery and agree to staff/support contact as indicated.     Revision Date:  Target Date: 8/29/23  Completion Date: 7 DAY REVISION:    Date Initiated:  Revision Date:   Target Date:   Completion Date:      PSYCHIATRY:  Date Initiated:  08/18/23  Medication Management and Education       Revision Date:       The person(s) responsible for carrying out the plan is Avinash Hughes DO and Donna Russo PA-C    NURSING/SYMPTOM EDUCATION:   Date Initiated: 08/18/23  1.1, 1.2. 1.3, 1.4 This RN/Or Therapist will provide wellness/symptoms and skill education groups three to five days weekly to educate Richard Bunch on signs and symptoms of diagnoses, skills to manage, and medication questions that will be addressed by the treatment team.    Revision date: The person(s) responsible for carrying out the plan is EMMA Mai, PAULINEW ; Lucinda Halsted, RN, BSN    PSYCHOLOGY:   Date Initiated: 08/18/23       1.1, 1.2, 1.4 Provide psychotherapy group 5 times per week to allow opportunity for Richard Bunch  to explore stressors and ways of coping. Revision Date:   The person(s) responsible for carrying out the plan is EMMA Mai,RANDY; Stephie Jeff    ALLIED THERAPY:   Date Initiated: 08/18/23  1.1,1.2 Engage Richard Finger in AT group 5 times weekly to encourage development and use of wellness tools to decrease symptoms and promote recovery through meaningful activity. Revision Date:       The person(s) responsible for carrying out the plan is CHANDU Cagle ; ATUL Mai MT-BC    CASE MANAGEMENT:   Date Initiated: 08/18/23      1.0 This  will meet with Richard Bunch  3-4 times weekly to assess treatment progress, discharge planning, connection to community supports and UR as indicated. Revision Date:   The person(s) responsible for carrying out the plan is Luiza Velasquez    TREATMENT REVIEW/COMMENTS:     DISCHARGE CRITERIA: Identify 3 signs of progress and complete relapse prevention plan. DISCHARGE PLAN: Connect with identified outpatient providers.    Estimated Length of Stay: 10 treatment days             Diagnosis and Treatment Plan explained to Terence Thakur relates understanding diagnosis and is agreeable to Treatment Plan. CLIENT COMMENTS / Please share your thoughts, feelings, need and/or experiences regarding your treatment plan with Staff. Please see follow up note with comments. Signatures can be found on [facility] Treatment plan consent form.
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
## 2023-08-18 NOTE — PSYCH
Subjective:     Patient ID: Dave Velazquez is a 46 y.o. female. [facility] Clinical Progress Notes      Specialized Services Documentation  Therapist must complete separate progress note for each specific clinical activity in which the individual participated during the day. [facility] 2527-3619 Dave Velazquez actively participated in music therapy group regarding confidence. The group participated in a series of call and response rounds using body percussion and their voice. The group then discussed confidence and where they feel they struggle with confidence. The group then processed and problem solved areas where group members needed confidence. The group then listened to and discussed the song "Im Still Standing" and how it relates to confidence. Sharan Barker contributed needing confidence in advocating her needs to her family to the group discussion. Some effort noted towards treatment goal. Continue AT to encourage the development of confidence.      Tx Plan Objective: 1.1,1.2,1.4, Therapist: Navya Dyer, [facility], [facility]

## 2023-08-18 NOTE — TELEPHONE ENCOUNTER
Note entered into chart. Patient may access via [employer] or we can send via fax. Please let patient know.  Thanks
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
## 2023-08-21 ENCOUNTER — OFFICE VISIT (OUTPATIENT)
Dept: PSYCHOLOGY | Facility: CLINIC | Age: 48
End: 2023-08-21
Payer: COMMERCIAL

## 2023-08-21 ENCOUNTER — HOSPITAL ENCOUNTER (OUTPATIENT)
Dept: INFUSION CENTER | Facility: HOSPITAL | Age: 48
Discharge: HOME | End: 2023-08-21
Attending: FAMILY MEDICINE
Payer: COMMERCIAL

## 2023-08-21 VITALS
HEART RATE: 105 BPM | RESPIRATION RATE: 18 BRPM | DIASTOLIC BLOOD PRESSURE: 76 MMHG | SYSTOLIC BLOOD PRESSURE: 124 MMHG | TEMPERATURE: 97.7 F | OXYGEN SATURATION: 97 %

## 2023-08-21 DIAGNOSIS — E44.1 MILD PROTEIN-CALORIE MALNUTRITION (HCC): ICD-10-CM

## 2023-08-21 DIAGNOSIS — F33.41 RECURRENT MAJOR DEPRESSIVE DISORDER, IN PARTIAL REMISSION (HCC): ICD-10-CM

## 2023-08-21 DIAGNOSIS — F41.1 GENERALIZED ANXIETY DISORDER: Primary | Chronic | ICD-10-CM

## 2023-08-21 DIAGNOSIS — R19.7 VOMITING AND DIARRHEA: ICD-10-CM

## 2023-08-21 DIAGNOSIS — L50.1 CHRONIC IDIOPATHIC URTICARIA: Primary | ICD-10-CM

## 2023-08-21 DIAGNOSIS — R63.0 ANOREXIA: ICD-10-CM

## 2023-08-21 DIAGNOSIS — R11.10 VOMITING AND DIARRHEA: ICD-10-CM

## 2023-08-21 PROCEDURE — G0176 OPPS/PHP;ACTIVITY THERAPY: HCPCS

## 2023-08-21 PROCEDURE — 90832 PSYTX W PT 30 MINUTES: CPT

## 2023-08-21 PROCEDURE — S0201 PARTIAL HOSPITALIZATION SERV: HCPCS

## 2023-08-21 PROCEDURE — 96360 HYDRATION IV INFUSION INIT: CPT

## 2023-08-21 PROCEDURE — G0177 OPPS/PHP; TRAIN & EDUC SERV: HCPCS

## 2023-08-21 PROCEDURE — 96361 HYDRATE IV INFUSION ADD-ON: CPT

## 2023-08-21 PROCEDURE — 96372 THER/PROPH/DIAG INJ SC/IM: CPT

## 2023-08-21 PROCEDURE — G0410 GRP PSYCH PARTIAL HOSP 45-50: HCPCS

## 2023-08-21 RX ORDER — EPINEPHRINE 1 MG/ML
0.3 INJECTION, SOLUTION, CONCENTRATE INTRAVENOUS
OUTPATIENT
Start: 2023-09-11

## 2023-08-21 RX ORDER — EPINEPHRINE 1 MG/ML
0.3 INJECTION, SOLUTION, CONCENTRATE INTRAVENOUS
Status: DISCONTINUED | OUTPATIENT
Start: 2023-08-21 | End: 2023-08-24 | Stop reason: HOSPADM

## 2023-08-21 RX ADMIN — SODIUM CHLORIDE 1500 ML: 0.9 INJECTION, SOLUTION INTRAVENOUS at 15:15

## 2023-08-21 RX ADMIN — OMALIZUMAB 300 MG: 150 INJECTION, SOLUTION SUBCUTANEOUS at 15:21

## 2023-08-21 NOTE — PSYCH
Subjective:     Patient ID: Ward Shaver is a 46 y.o. female. [facility] Clinical Progress Notes      Specialized Services Documentation  Therapist must complete separate progress note for each specific clinical activity in which the individual participated during the day. Education Therapy   [id number] Ward Shaver actively shared in morning assessment and goal review. Presented as Receptive related to readiness to learn. Ward Shaver did complete goal from last treatment day identifying gaining hope, education, advocacy, responsibility and support. did not present with any barriers to learning. [address] [place] [address] engaged throughout the treatment day. Was engaged in learning related to Illness, Medication, Aftercare, and Wellness Tools. Staff utilized Verbal, Written, A/V, and Demonstration teaching methods. Ward Shaver shared area of learning and set a goal for outside of program to set up aquariums and go for two walks.       Tx Plan Objective: 1.1,1.2,1.4 Therapist:  Mookie CARDOZA RN

## 2023-08-21 NOTE — PSYCH
Visit Time    Visit Start Time: 4803  Visit Stop Time: 1330  Total Visit Duration: 60 minutes    Subjective:     Patient ID: Angi Rider is a 46 y.o. female. [facility] Clinical Progress Notes      Specialized Services Documentation  Therapist must complete separate progress note for each specific clinical activity in which the individual participated during the day. Group Psychotherapy - Self-Sabotage   Angi Rider attended group on self-sabotage. This writer led a group discussion on what self-sabotage is and how it effects wellness. Following this, the group reviewed the types/signs of self-sabotage and applied it to their own behaviors to see which they engage in most frequently. The group members were provided a worksheet to complete to assess and stop their self-sabotaging behaviors. This worksheet provided a detailed plan on how they can overcome their specific self-sabotaging habits. Angi Rider made good progress towards treatments goals as displayed through note taking and participation in the discussion. Casandra Del Valle shared how he often struggles with self sabotaging and created a list replacement behaviors she could engage in.  Continue to run daily group psychotherapy to meet treatment needs and encourage Angi Rider to practice skills outside of program.     Tx Plan Objective: 1.1,1.2,1.4 Therapist: PABLO Diaz [facility]

## 2023-08-21 NOTE — PROGRESS NOTES
Pt arrived in infusion with EPI pen at bedside  Exp     Date  3/24.  Pt is here for xolair injection

## 2023-08-21 NOTE — PSYCH
Subjective:     Patient ID: Adrienne Oliveira is a 46 y.o. female. [facility] Clinical Progress Notes      Specialized Services Documentation  Therapist must complete separate progress note for each specific clinical activity in which the individual participated during the day. Group Psychotherapy Life Skills  ([id number])  Adrienne Oliveira actively engaged in group focused on developing self-compassion . Group members discussed what is self compassion. During the group we discussed the benefits of self-compassion and how it helps improve well-being, connection to others, increases selflessness, regulates emotions, reduces depression and anxiety. Group members practiced self-compassion by completing self-compassion exercises drawn from "The Self-Compassion Deck". Becca Nunez discussed how they could bring self-compassion into their lives by taking a moment and breathing . Becca Nunez continues to make progress towards goals through verbal participation in group; to accomplish long term goals continue to utilize skills learned in programming. Continue with psychotherapy to educate and encourage use of wellness tools. Tx Plan Objective: 1.1,1.2 Therapist: Leticia Denise      Case Management Note    EMMA Cherry    Current suicide risk : Low     [id number]- Becca Nunez was very tearful during case management when discussing her strained relationship with her mom. Becca Nunez reports that her mother has always been very negative towards her and "puts my  high on a pedestal." Becca Nunez shared about her mother's strained relationships with other family members and how that has effected her. This writer validated Celine's feelings . We discussed setting better boundaries and the consequences when those boundaries are not respected. Becca Nunez discussed that she would like to improve communication between her and her . This writer reviewed [facility] with her and provider her worksheets on [facility]  .  Discussed attending virtual support groups . Virtual support group list was sent. Tx plan reviewed. Informed of med check and case management schedule. Medications changes/added/denied? No    Treatment session number: 2    Individual Case Management Visit provided today?  Yes     [facility] follow up physician's orders: none at this time

## 2023-08-21 NOTE — PSYCH
Subjective:     Patient ID: Tommy Bullock is a 46 y.o. female. [facility] Clinical Progress Notes      Specialized Services Documentation  Therapist must complete separate progress note for each specific clinical activity in which the individual participated during the day. Allied Therapy [id number] Tommy Bullock actively participated in music therapy group regarding healthy and unhealthy relationships. The group participated in a conversation about the health of the relationships in their life. The group then participated in a series of tay discussions on songs demonstrating healthy and unhealthy relationships. The group read and discussed two handouts regarding relationship green flags and destructive/interfering relationships. Celia Ruiz identified communicating needs as an area that needs focus in her relationships. Some effort noted towards treatment goal. Continue AT to encourage the development and maintenance of healthy relationships.     Tx Plan Objective: 1.1,1.2,1.4, Therapist:  ATUL Holcomb, MT-BC

## 2023-08-22 ENCOUNTER — OFFICE VISIT (OUTPATIENT)
Dept: PSYCHOLOGY | Facility: CLINIC | Age: 48
End: 2023-08-22
Payer: COMMERCIAL

## 2023-08-22 DIAGNOSIS — F41.1 GENERALIZED ANXIETY DISORDER: Primary | Chronic | ICD-10-CM

## 2023-08-22 DIAGNOSIS — F33.41 RECURRENT MAJOR DEPRESSIVE DISORDER, IN PARTIAL REMISSION (HCC): ICD-10-CM

## 2023-08-22 PROCEDURE — S0201 PARTIAL HOSPITALIZATION SERV: HCPCS

## 2023-08-22 PROCEDURE — G0176 OPPS/PHP;ACTIVITY THERAPY: HCPCS

## 2023-08-22 PROCEDURE — G0177 OPPS/PHP; TRAIN & EDUC SERV: HCPCS

## 2023-08-22 PROCEDURE — 90832 PSYTX W PT 30 MINUTES: CPT

## 2023-08-22 PROCEDURE — G0410 GRP PSYCH PARTIAL HOSP 45-50: HCPCS

## 2023-08-22 NOTE — PSYCH
Subjective:     Patient ID: Julien Reyes is a 46 y.o. female. [facility] Clinical Progress Notes      Specialized Services Documentation  Therapist must complete separate progress note for each specific clinical activity in which the individual participated during the day. GROUP PSYCHOTHERAPY- Discharge Prep   ([id number]) Group was facilitated virtually in a private office using HIPAA Compliant and Approved Microsoft Teams. Julien Reyes consented to the use of tele-video modality of treatment and was virtually present for group psychotherapy today. The group engaged in discussion about discharge prep and life after discharge for them. We discussed making outpatient provider appointments, importance of keeping appointments, support groups, warm/peer line, crisis lines, relapse prevention plan, what to expect day of discharge. Then, group discussed the challenges and benefits about being discharged. Members were given the opportunity to process their emotions regarding no longer being in the partial program. Group members were then asked the following questions:    1. What are two ways you can start planning towards life after the partial program?   2. What is scary about discharge? 3. What is something you can look forward to? Julien Reyes seemed attentive throughout the group session. Julien Reyes stated that something they fear about discharge is loss of routine. Julien Reyes encouraged to make progress towards goals and objectives through group participation and will continue to attend psychotherapy group.       Tx plan objective: 1.1, 1.2   Therapist: Radha CARDOZA RN

## 2023-08-22 NOTE — PSYCH
Subjective:     Patient ID: Jamarcus Burden is a 46 y.o. female. [facility] Clinical Progress Notes      Specialized Services Documentation  Therapist must complete separate progress note for each specific clinical activity in which the individual participated during the day. Group Psychotherapy Life Skills ([id number]) Jamarcus Burden actively engaged in group focused on coping skills which was facilitated virtually in a private office using HIPAA Compliant and Approved [employer] Teams. Paezarmani Rivera consented to the use of tele-video modality of treatment and was virtually present for group psychotherapy today. During group we created a list of coping skills from A-Z which patients can utilize when they are feeling stressed. A list was generated on the shared word document  and they were given a copy of the A-Z coping strategies list that they created. Philippe Rivera identified that a coping skill they would like to use is leo. Philippe Rivera continues to make progress towards goals through verbal participation in group; to accomplish long term goals continue to utilize skills learned in programming. Continue with psychotherapy to educate and encourage use of wellness tools. Tx Plan Objective: 1.1,1.2 Therapist: Real Lees      Case Management Note    EMMA Barahona    Current suicide risk : Low     [id number]- Paezarmani Rivera shared with this writer about a conversation between her and her . She discussed the boundaries that she is estabilishing and the supports. Paezarmani Rivera shared about her evening plans with her mom and the boundaries she is going to implement. Informed of case management and med check. Medications changes/added/denied? No    Treatment session number: 3    Individual Case Management Visit provided today?  Yes     [facility] follow up physician's orders: none at this time

## 2023-08-22 NOTE — PSYCH
Visit Time    Visit Start Time: 0930  Visit Stop Time: 6884  Total Visit Duration: 60 minutes    Subjective:     Patient ID: Augustine Resendez is a 46 y.o. female. [facility] Progress Notes      Specialized Services Documentation  Therapist must complete separate progress note for each specific clinical activity in which the individual participated during the day. Group Psychotherapy - [place]  This group was facilitated face to face in a private office setting using HIPAA compliant protocols. Augustine Resendez attended group on [employer] Who activity. This group engaged members to share four traits, fun facts, or experiences, of their choice, and the remaining members have to guess which member of the group wrote that card. Members were encouraged to discuss each card, share their insights, and openly process their thoughts. The goal of todays group was to practice a variety of skills some including communication skills, listening skills, and coping mechanisms. This writer encouraged members to feel comfortable to share their facts and discussing them openly. Augustine Resendez made good efforts towards progress goals which were displayed through participation and engagement in topic. Prince Vergara shared things that she is passionate about with the group. Continue to run daily group psychotherapy to meet treatment needs and encourage Augustine Resendez to practice skills outside of program.   Tx Plan Objective:1.1,1.2,1.4  Therapist: [facility]    Education Therapy   [id number] Augustine Resendez actively shared in morning assessment and goal review. Presented as Receptive related to readiness to learn. Augustine Resendez did complete goal from last treatment day identifying a gain of support. did not present with any barriers to learning. [address] [place] [address] engaged throughout the treatment day. Was engaged in learning related to Illness, Medication, Aftercare, and Wellness Tools.  Staff utilized Verbal, Written, A/V, and Demonstration teaching methods. Kathy Nunez shared area of learning and set a goal for outside of program to have a fun night with her mom.       Tx Plan Objective: 1.1,1.2,1.4, Therapist: Rizwana Cohen, [address]

## 2023-08-23 ENCOUNTER — OFFICE VISIT (OUTPATIENT)
Dept: PSYCHOLOGY | Facility: CLINIC | Age: 48
End: 2023-08-23
Payer: COMMERCIAL

## 2023-08-23 DIAGNOSIS — F41.1 GENERALIZED ANXIETY DISORDER: Chronic | ICD-10-CM

## 2023-08-23 DIAGNOSIS — F32.3 MAJOR DEPRESSIVE DISORDER WITH PSYCHOTIC FEATURES (HCC): Primary | ICD-10-CM

## 2023-08-23 PROCEDURE — S0201 PARTIAL HOSPITALIZATION SERV: HCPCS

## 2023-08-23 PROCEDURE — G0410 GRP PSYCH PARTIAL HOSP 45-50: HCPCS

## 2023-08-23 NOTE — PSYCH
Subjective:     Patient ID: Francisco J Martines is a 46 y.o. female. [facility] Progress [facility]      Specialized Services Documentation  Therapist must complete separate progress note for each specific clinical activity in which the individual participated during the day. Group Psychotherapy Life Skills ([id number]) Francisco J Martines actively engaged in group focused on strengths mapping . Group members were instructed to think of a positive experience that they had because they made it positive. Group members shared their experience and identified the things that they did to make it successful. Francisco J Martines shared the positive experience of going on vacation and to a medication appointment during the trip and identified the six strengths that they used to make it successful . The group discussed why knowing their strengths was important. Group members discussed how they could apply their strengths to a current stressor. Francisco J Martines continues to make progress towards goals through verbal participation in group; to accomplish long term goals continue to utilize skills learned in programming. Continue with psychotherapy to educate and encourage use of wellness tools. Tx Plan Objective: 1.1,1.2 Therapist: Claudetta Guest , LSW    Education Therapy   [id number] Francisco J Martines actively shared in morning assessment and goal review. Presented as Receptive related to readiness to learn. Francisco J Martines did complete goal from last treatment day identifying gaining advocacy, support, education. did not present with any barriers to learning. Harlan Calvillo was not present for group due to having to leave early for blood work    Tx Plan Objective: 1.1,1.2,1.4 Therapist:  EMMA Kennedy, RANDY    Case Management Note    EMMA Kennedy    Current suicide risk : Low     A case management session is not scheduled today with Francisco J Martines ; additionally, they did not request a CM meeting.   Next scheduled session is 8/24/23. Medications changes/added/denied? No    Treatment session number: 4    Individual Case Management Visit provided today?  No    [facility] follow up physician's orders: None at this time

## 2023-08-23 NOTE — PSYCH
Visit Time    Visit Start Time: 7078  Visit Stop Time: 1330  Total Visit Duration: 0 minutes    Subjective:     Patient ID: Ashtyn Carrasco is a 46 y.o. y.o. female. [facility] Progress [facility]      Specialized Services Documentation  Therapist must complete separate progress note for each specific clinical activity in which the individual participated during the day. Group Psychotherapy  Not present due to appointment; CM aware.      Tx Plan Objective: na Therapist:  Avelina SCHOFIELD

## 2023-08-23 NOTE — PSYCH
Subjective:     Patient ID: Erik Schuster is a 46 y.o. female. [facility] Progress [facility]      Specialized Services Documentation  Therapist must complete separate progress note for each specific clinical activity in which the individual participated during the day. Allied Therapy [id number] Erik Schuster actively participated in music therapy group regarding the locus of control. The group listened to and discussed the song "Simple Son" focusing on the perspective of the woody. The group discussed the concept of control and where they feel they struggle with control in their life. The group then read and discussed the "locus of control" handout. The group then worked on the "what can I control" worksheet, identifying what in their life is/is not in their control. Jose Maria Cheneyarmani shared difficulty accepting compliments as an internal locus of control that needs attention. Some effort noted towards treatment goal. Continue AT to encourage the development of an internal locus of control.   Tx Plan Objective: 1.1,1.2,1.4, Therapist:  Devora Oconnor, [facility], [place]

## 2023-08-24 ENCOUNTER — OFFICE VISIT (OUTPATIENT)
Dept: PSYCHOLOGY | Facility: CLINIC | Age: 48
End: 2023-08-24
Payer: COMMERCIAL

## 2023-08-24 DIAGNOSIS — F41.1 GENERALIZED ANXIETY DISORDER: Primary | ICD-10-CM

## 2023-08-24 DIAGNOSIS — F33.41 RECURRENT MAJOR DEPRESSIVE DISORDER, IN PARTIAL REMISSION (HCC): ICD-10-CM

## 2023-08-24 PROCEDURE — S0201 PARTIAL HOSPITALIZATION SERV: HCPCS

## 2023-08-24 PROCEDURE — G0177 OPPS/PHP; TRAIN & EDUC SERV: HCPCS

## 2023-08-24 PROCEDURE — G0410 GRP PSYCH PARTIAL HOSP 45-50: HCPCS

## 2023-08-24 PROCEDURE — G0176 OPPS/PHP;ACTIVITY THERAPY: HCPCS

## 2023-08-24 NOTE — PSYCH
Subjective:     Patient ID: Ward Shaver is a 46 y.o. female. [facility] Progress [facility]      Specialized Services Documentation  Therapist must complete separate progress note for each specific clinical activity in which the individual participated during the day. Case Management Note    EMMA Robb    Current suicide risk : Low     [id number]- Katia Muñoz shared about her visit with her mom. She is working on establishing boundaries and has been successful . Katia Muñoz would like to start volunteering and has put in two application for volunteer programs. She shared about her weekend plans with family and friends. Informed of case management. Medications changes/added/denied? No    Treatment session number: 5    Individual Case Management Visit provided today?  Yes     [facility] follow up physician's orders: None at this time

## 2023-08-24 NOTE — PSYCH
Visit Time    Visit Start Time: 3666  Visit Stop Time: 1330  Total Visit Duration: 60 minutes    Subjective:     Patient ID: Ward Shaver is a 46 y.o. female. [facility] Progress [facility]      Specialized Services Documentation  Therapist must complete separate progress note for each specific clinical activity in which the individual participated during the day. Group Psychotherapy - Madi Raymond attended group on [address]. Group members followed the DBT house template in order to self-explore and recognize various domains of their wellness. Some of these domains include coping skills, strengths, values, supports, emotions, and behaviors. This writer led a group discussion on their feelings and reflections stemming from the completion of the DBT house exercise. This writer encouraged the group members to partake in group discussion and utilize supplemental materials inside and outside of the program.  Ward Shaver made good efforts towards progress goals which were displayed through note taking, participation in discussion, and engagement in topic. Katia Muñoz shared she wanted to further explore the things she keeps hidden from others.   Continue to run daily group psychotherapy to meet treatment needs and encourage Ward Shaver to practice skills outside of program.       Tx Plan Objective: 1.1,1.2,1.4 Therapist: [address]

## 2023-08-24 NOTE — PSYCH
Subjective:     Patient ID: Kit Kent is a 46 y.o. female. [facility] Progress [facility]      Specialized Services Documentation  Therapist must complete separate progress note for each specific clinical activity in which the individual participated during the day. Allied Therapy [phone] Kit Kent actively participated in music therapy group regarding self image. The group participated in a discussion about their idea of self-image and whether they feel like they struggle in this area. The group then listened to and discussed the song "Creep", identifying if it portrays a healthy self image, why, and how they relate/diverge. The group then read and discussed the "Body Image" info sheet. The group discussed  facts from judgements and identifying what elements of their self-image in under their control. Solange Stone shared advocating for her needs with her  as a way of improving her self image. Some effort noted towards treatment goal. Continue AT to encourage the development of a healthy self-image. Tx Plan Objective: 1.1,1.2,1.4, Therapist:  ATUL Crane [place]-[facility]    Education Therapy   [id number] Kit Kent actively shared in morning assessment and goal review. Presented as Receptive related to readiness to learn. Kit Kent did complete goal from last treatment day identifying gaining hope, education, advocacy, responsibility, and support. did not present with any barriers to learning. [address] [place] [address] engaged throughout the treatment day. Was engaged in learning related to [employer]. Staff utilized Verbal, Written, A/V and Demonstration teaching methods. Kit Kent shared area of learning and set a goal for outside of program to apply to volunteer for summer camps.       Tx Plan Objective: 1.1,1.2,1.4, Therapist:  ATUL Crane [place]-[facility]

## 2023-08-24 NOTE — PSYCH
Subjective:     Patient ID: Chepe Ruiz is a 46 y.o. female. [facility] Progress Notes      Specialized Services Documentation  Therapist must complete separate progress note for each specific clinical activity in which the individual participated during the day. Group Psychotherapy  This group was facilitated virtually in a private office using HIPAA Compliant and Approved Microsoft Teams. Chepe Ruiz consented to the use of tele-video modality of treatment. ([id number])  Chepe Ruiz actively shared in psychoeducational group which focused on Tapping skills- Emotional Freedom Technique (EFT) as a coping skill. How Tapping can help, where Tapping comes from, why Tapping works and how to [address] were discussed with the group. The group discussed benefits of Tapping. The group was then guided through a Tapping scenario. The group wrapped up by making a goal to be more positive. Good progress towards goal noted. Continue psychoeducation to educate on the use of Tapping as a healthy coping skill to improve mental wellbeing.   Tx. Plan Objectives: [de-identified]   Therapist: Dominick CARDOZA RN

## 2023-08-25 ENCOUNTER — OFFICE VISIT (OUTPATIENT)
Dept: PSYCHOLOGY | Facility: CLINIC | Age: 48
End: 2023-08-25
Payer: COMMERCIAL

## 2023-08-25 ENCOUNTER — OFFICE VISIT (OUTPATIENT)
Dept: PSYCHIATRY | Facility: CLINIC | Age: 48
End: 2023-08-25
Payer: COMMERCIAL

## 2023-08-25 DIAGNOSIS — F41.1 GENERALIZED ANXIETY DISORDER: Primary | Chronic | ICD-10-CM

## 2023-08-25 DIAGNOSIS — F33.41 RECURRENT MAJOR DEPRESSIVE DISORDER, IN PARTIAL REMISSION (HCC): ICD-10-CM

## 2023-08-25 DIAGNOSIS — F41.1 GENERALIZED ANXIETY DISORDER: Chronic | ICD-10-CM

## 2023-08-25 DIAGNOSIS — F33.41 RECURRENT MAJOR DEPRESSIVE DISORDER, IN PARTIAL REMISSION (HCC): Primary | ICD-10-CM

## 2023-08-25 PROCEDURE — S0201 PARTIAL HOSPITALIZATION SERV: HCPCS

## 2023-08-25 PROCEDURE — G0410 GRP PSYCH PARTIAL HOSP 45-50: HCPCS

## 2023-08-25 PROCEDURE — 99213 OFFICE O/P EST LOW 20 MIN: CPT | Performed by: PHYSICIAN ASSISTANT

## 2023-08-25 PROCEDURE — G0176 OPPS/PHP;ACTIVITY THERAPY: HCPCS

## 2023-08-25 PROCEDURE — G0177 OPPS/PHP; TRAIN & EDUC SERV: HCPCS

## 2023-08-25 NOTE — PSYCH
Subjective:     Patient ID: Tommy Bullock is a 46 y.o. female. [facility] Progress Notes      Specialized Services Documentation  Therapist must complete separate progress note for each specific clinical activity in which the individual participated during the day. Case Management Note    EMMA Holcomb    Current suicide risk : Low     A case management session is not scheduled today with Tommy Bullock ; additionally, they did not request a CM meeting. Next scheduled session is 8/28/23. Medications changes/added/denied? No    Treatment session number: 6    Individual Case Management Visit provided today?  No    [facility] follow up physician's orders: None at this time

## 2023-08-25 NOTE — PSYCH
Progress Note - Behavioral Health   [place] 46 y.o. female MRN: [id number]     Progress at partial program: improving. Chart reviewed and discussed in treatment team.  Seen by Dr Stanislaw Black 8/18 at which time discharge meds unchanged. Brant Grey feeling "much better". Feeling more hopeful. Sleep is much better. Notes increase in motivation and very good ADLS. Using coping skills to manage anxiety and has been using less prn atarax. Uses prn benadryl for allergic symptoms- mostly on Tuesdays prior to immunoglobulin infusion.         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   • Vomiting and diarrhea 08/25/2017   • SIRS (systemic inflammatory response syndrome) (HCC) 08/25/2017   • Vitamin D deficiency 08/25/2017   • Hypokalemia 08/25/2017   • Throat tightness 08/25/2017   • Abdominal pain 08/25/2017   • Headache 08/25/2017   • Hypomagnesemia 08/26/2017   • Generalized anxiety disorder 08/26/2017   • Acquired hypothyroidism 01/13/2014   • Leukocytosis 09/10/2017   • CHF (congestive heart failure) ([address]) 11/29/2017   • Fibromyalgia 12/28/2017   • 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   • Meniere's disease 11/29/2017   • Chronic idiopathic urticaria 05/02/2017   • Disorder of synovium 06/23/2017   • Primary localized osteoarthrosis of ankle and foot 06/04/2018   • Thrush 02/12/2019   • Chronic back pain 02/12/2019   • Thrombocytosis 08/26/2019   • Proctitis 08/26/2019   • Immunosuppressed status ([address]) 08/27/2019   • Mass of left axilla 08/30/2019   • Constipation 09/10/2019   • Mild protein-calorie malnutrition ([address]) 09/12/2019   • Anorexia 10/17/2020   • Crohn's colitis ([address]) 10/17/2020   • Overweight (BMI 25.0-29.9) 01/20/2021   • Current chronic use of systemic steroids 01/28/2021   • Migraine    • MS (multiple sclerosis) ([address]) 01/28/2021   • Inflammatory bowel disease 03/07/2021   • Heart palpitations 03/26/2021   • Intractable nausea and vomiting 12/02/2021   • Anaphylactic reaction 12/04/2021   • Nephrolithiasis 12/06/2021   • Atrial tachycardia ([address]) 02/24/2022   • Immunosuppression due to chronic steroid use ([address]) 08/05/2022   • Seizure ([address])    • Bladder tumor 06/03/2023   • Systemic lupus erythematosus, unspecified SLE type, unspecified organ involvement status ([address]) 06/23/2023   • Encephalopathy chronic 07/19/2023   • Passive suicidal ideations 07/22/2023   • Medical clearance for psychiatric admission 07/25/2023   • Seasonal allergies 07/25/2023   • GERD without esophagitis 07/25/2023   • Major depressive disorder with psychotic features ([address]) 07/25/2023   • Benzodiazepine misuse 07/25/2023   • Recurrent major depressive disorder, in partial remission ([address]) 08/18/2023     Resolved Ambulatory Problems     Diagnosis Date Noted   • Lactic acidosis 08/25/2017   • Hyponatremia 03/11/2021   • Decreased oral intake 05/29/2023   • Hematuria      Past Medical History:   Diagnosis Date   • Anemia 2007   • Anxiety    • Asthma    • Bile duct stricture 2014   • Chronic kidney disease    • Colon polyp 1998   • Deep vein thrombosis (HCC)    • Disease of thyroid gland    • Disorder of sphincter of Oddi    • GERD (gastroesophageal reflux disease) 1995   • GI (gastrointestinal bleed) 1994   • Heart murmur    • Irritable bowel syndrome 2016   • Lactose intolerance 1982   • Mast cell disease    • Myocardial infarction ([address])    • Pancreatitis    • Psychiatric disorder    • RA (rheumatoid arthritis) ([address])    • Seizures ([address])    • Ulcerative colitis ([address])    • Visual impairment      Family History   Problem Relation Age of Onset   • Ulcerative colitis Mother    • Arthritis Mother    • Colon polyps Mother    • Heart failure Father    • Neuropathy Father    • Macular degeneration Father    • Diabetes Father    • Early death Father    • Vision loss Father    • Asthma Daughter    • Hypothyroidism Daughter    • Heart disease Maternal Grandmother    • Arthritis Maternal Grandmother    • No Known Problems Maternal Grandfather    • Arthritis Paternal Grandmother    • Macular degeneration Paternal Grandmother    • Vision loss Paternal Grandmother    • Lymphoma Paternal Grandfather    • Cancer Paternal Grandfather    • Early death Paternal Grandfather    • Breast cancer Maternal Aunt    • Cancer Maternal Aunt    • Miscarriages / Stillbirths Maternal Aunt    • Heart failure Paternal Aunt    • Heart failure Paternal Aunt    • Irritable bowel syndrome Paternal Aunt    • Breast cancer Paternal Aunt    • Breast cancer additional onset Paternal Aunt    • Hypothyroidism Paternal Aunt    • Cancer Paternal Aunt    • No Known Problems Son    • No Known Problems Son    • Kidney disease Brother    • Depression Paternal Uncle    • Early death Paternal Uncle      Allergies   Allergen Reactions   • Aimovig [Erenumab-Aooe] Anaphylaxis   • Amitriptyline Anaphylaxis     According to the patient she had nphylaxis   • Aspergillus Allergy Skin Test Other (See Comments), Hives and Swelling   • Azathioprine Other (See Comments) and Anaphylaxis     pancreatitis  According to patient she needed Epipen   • Banana - Food Allergy Itching, Swelling and Anaphylaxis   • Buspar [Buspirone] Hives and Shortness Of Breath   • Citric Acid-Polysorbate 80 Abdominal Pain, Anaphylaxis, Anxiety, Bradycardia, Diarrhea, Fatigue, GI Intolerance, Headache, Hives, Hyperactivity, Itching, Lip Swelling, Nausea Only, Palpitations, Rash, Shortness Of Breath, Tachycardia, Throat Swelling, Tongue Swelling and Vomiting   • Corn Dextrin Anaphylaxis     Any corn based products   • Eggs Or Egg-Derived Products - Food Allergy Anaphylaxis   • Epinephrine Anaphylaxis   • Erenumab Anaphylaxis     patient sent portal message stating she had anaphylaxis  patient sent portal message stating she had anaphylaxis     • Gadolinium Abdominal Pain, Anaphylaxis, Anxiety, Confusion, Delirium, Dizziness, Eye Swelling, Fatigue, GI Intolerance, Headache, Hives, Irritability, Itching, Lip Swelling, Nausea Only, Palpitations, Photosensitivity, Rash, Seizures, Shortness Of Breath, Swelling, Syncope, Throat Swelling, Tongue Swelling, Tremor, Visual Disturbance and Vomiting   • Gelatin - Food Allergy Anaphylaxis   • Heparin Hives     Painful welts    • Lactated Ringers Shortness Of Breath     Pt questions this allergy, pt has received LR multiple times without reaction   • Lavender Oil Anaphylaxis     Other reaction(s): anaphalxis   • Malto Dextrin [Dextrin] Anaphylaxis   • Other Anaphylaxis     Gel caps, maltodextrose, dextrose,grapes, lavender    • Penicillium Notatum Shortness Of Breath and Swelling   • Red Dye - Food Allergy Other (See Comments) and Anaphylaxis     intolerance   • Sumatriptan Anaphylaxis     Bradycardia, sob, back pressure, head pain,throat tightness  According to the patient she had anphylaxis   • Ustekinumab Anaphylaxis   • Contrast [Iodinated Contrast Media] Other (See Comments)     Pt states needs to be premedicated prior to injection   • Corn Oil - Food Allergy - Food Allergy Hives   • Humira [Adalimumab] Other (See Comments)     "I develop drug induced lupus"   • Ibuprofen Hives and Throat Swelling   • Polyethylene Glycol Diarrhea and Vomiting   • Remicade [Infliximab] Other (See Comments)     "I develop drug induced lupus"   • Soy Allergy - Food Allergy Other (See Comments)   • Sulfa Antibiotics Hives and Other (See Comments)   • Sulfate Hives   • Sulfites - Food Allergy Hives   • Sweet Corn Extract Skin Test - Food Allergy Hives and Itching   • Chlorhexidine Itching   • Freederm Adhesive Remover [New Skin] Rash   • Histamine Hives, Rash and Other (See Comments)     Intolerance            Mental Status Evaluation:    Appearance:  age appropriate, excellent grooming   Behavior:  pleasant, cooperative   Speech:  normal rate and volume   Mood:  improved   Affect:  brighter   Thought Process:  goal directed   Associations: intact associations   Thought Content:  no overt delusions   Perceptual Disturbances: none   Risk Potential: Suicidal ideation - None at present  Homicidal ideation - None at present   Sensorium:  oriented to person, place and time/date   Memory:  recent and remote memory grossly intact   Consciousness:  alert and awake   Attention: attention span and concentration are age appropriate   Insight:  good   Judgment: good   Gait/Station: normal gait/station   Motor Activity: no abnormal movements       Laboratory results: I have personally reviewed all pertinent laboratory/tests results.       Assessment   Diagnoses and all orders for this visit:    Recurrent major depressive disorder, in partial remission (HCC)    Generalized anxiety disorder       Current Outpatient Medications   Medication Sig Dispense Refill   • ARIPiprazole (ABILIFY) 10 mg tablet Take 1 tablet (10 mg total) by mouth daily Do not start before August 16, 2023. 30 tablet 0   • B-D 3CC LUER-SHAMEKA SYR 25GX1" 25G X 1" 3 ML MISC      • butalbital-acetaminophen-caffeine (FIORICET,ESGIC) -40 mg per tablet Take 1 tablet by mouth every 8 (eight) hours as needed for migraine 20 tablet 1   • cetirizine (ZyrTEC) 10 mg tablet Take 20 mg by mouth daily     • Cholecalciferol 10 MCG /0.025ML LIQD Take 1,000 Units by mouth 2 (two) times a day 750 mL 1   • diclofenac (VOLTAREN) 75 mg EC tablet      • EpiPen 2-Malachi 0.3 MG/0.3ML SOAJ INJECT 1 PEN INTO THE MUSCLE AS NEEDED FOR ANAPHALAXIS     • famotidine (PEPCID) 40 MG tablet Take 0.5 tablets (20 mg total) by mouth daily 90 tablet 0   • hydrOXYzine HCL (ATARAX) 25 mg tablet      • Lactobacillus (PROBIOTIC ACIDOPHILUS PO) Take by mouth     • Lactobacillus-Inulin ([facility]) CAPS Take 200 mg by mouth daily     • levalbuterol (XOPENEX HFA) 45 mcg/act inhaler Inhale 1-2 puffs every 4 (four) hours as needed for shortness of breath 45 g 2   • levothyroxine 100 mcg tablet Take 100 mcg by mouth every other day     • liothyronine (CYTOMEL) 5 mcg tablet Take 1 tablet (5 mcg total) by mouth daily Do not start before June 14, 2023. 30 tablet 0   • Melatonin 3 MG SUBL      • melatonin 3 mg Take 1 tablet (3 mg total) by mouth daily at bedtime 30 tablet 0   • montelukast (SINGULAIR) 10 mg tablet Take 1 tablet (10 mg total) by mouth daily at bedtime  0   • NON FORMULARY immunoglobin sq 6g 30 ml once a week     • nystatin (MYCOSTATIN) 500,000 units/5 mL suspension Swish and spit 5 mL (500,000 Units total) 4 (four) times a day 473 mL 0   • omalizumab (XOLAIR) 150 mg Inject 2.4 mL (300 mg total) under the skin every 28 days 1 each 2   • pantoprazole (PROTONIX) 40 mg tablet TAKE ONE TABLET BY MOUTH TWICE A DAY 60 tablet 5   • predniSONE 20 mg tablet Take 1 tablet (20 mg total) by mouth daily Take as instructed by physician. 20 mg three times a day for 3 days, then 20 mg twice daily 60 tablet 0   • sertraline (ZOLOFT) 100 mg tablet Take 1.5 tablets (150 mg total) by mouth daily Do not start before August 16, 2023. 45 tablet 0   • TechLite Lancets MISC      • traMADol (Ultram) 50 mg tablet Take 1 tablet (50 mg total) by mouth every 12 (twelve) hours as needed for moderate pain or severe pain 60 tablet 0   • traZODone (DESYREL) 50 mg tablet Take 1 tablet (50 mg total) by mouth daily at bedtime 30 tablet 0     No current facility-administered medications for this visit. Plan    Planned medication and treatment changes:   Improving with program.  No med change. Cont abilify 10 mg/d, zoloft 150 mg/d, trazodone 50 mg q bedtime, atarax 25 mg prn    All current active medications have been reviewed. Continue treatment with group therapy and partial program      Risks / Benefits of Treatment:    Risks, benefits, and possible side effects of medications explained to patient and patient verbalizes understanding and agrees to medications.      Counseling / Coordination of Care:    Patient's progress discussed with staff in treatment team meeting. Medications, treatment progress and treatment plan reviewed with patient.     Sri Graff PA-C 08/25/23   Visit Time    Visit Start Time: 0840  Visit Stop Time: 0647  Total Visit Duration: 10 minutes

## 2023-08-25 NOTE — PSYCH
Subjective:     Patient ID: Logan Del Castillo is a 46 y.o. female. [facility] Clinical Progress Notes      Specialized Services Documentation  Therapist must complete separate progress note for each specific clinical activity in which the individual participated during the day. Education Therapy   [id number] Logan Del Castillo actively shared in morning assessment and goal review. Presented as Receptive related to readiness to learn. Logan Del Castillo did complete goal from last treatment day identifying gaining hope, education, advocacy, responsibility and support. did not present with any barriers to learning. [facility] engaged throughout the treatment day. Was engaged in learning related to Illness, Medication, Aftercare, and Wellness Tools. Staff utilized Verbal, Written, A/V, and Demonstration teaching methods. Logan Del Castillo shared area of learning and set a goal for outside of program to have fun while doing planned activities.       Tx Plan Objective: 1.1,1.2,1.4 Therapist:  German CARDOZA RN

## 2023-08-25 NOTE — PSYCH
Subjective:     Patient ID: Logan Del Castillo is a 46 y.o. female. [facility] Clinical Progress Notes      Specialized Services Documentation    Group Psychotherapy - Radical Acceptance    [id number]: Logan Del Castillo attended group on the topic of Radical Acceptance. Group members were educated on the definition of radical acceptance, the purpose of radical acceptance, and how to practice radical acceptance. Group members answered the following questions:  • What is radical acceptance? • What is radical acceptance not? • What are exercises that may help achieve radical acceptance? • What are some struggles with achieving radical acceptance? Lastly, the group completed a worksheet related to radical acceptance to develop and enhance an understanding of boundaries and boundary setting. Members then focused the following aspects:   • Identifying distressing situations and related emotions/feelings/thoughts/beliefs  • Identifying the reality of the situation (objectivity)  • Identifying what is able to be changed about the situation, if anything, and what cannot be changed  • Practicing radical acceptance of the situation by acknowledging and accepting the reality of the situation without judgement or resistance  • Brainstorming action steps that can be taken to improve the situation or cope with it effectively  • Identifying self-care strategies that can be used during the challenging time    Logan Del Castillo actively participated without prompting, engaged with group, and exhibited good effort towards progress displayed through participation and engagement in topic. Group members were encouraged to use these questions to continue on their journey to meet treatment goals and overall wellness.     Treatment Plan Objectives: 1.1, 1.2, 1.4  Therapist: RANDY Andrade

## 2023-08-25 NOTE — PSYCH
Visit Time    Visit Start Time: 2056  Visit Stop Time: 9743  Total Visit Duration: 60 minutes    Subjective:     Patient ID: Alexis Loaiza is a 46 y.o. female. [facility] Clinical Progress Notes      Specialized Services Documentation  Therapist must complete separate progress note for each specific clinical activity in which the individual participated during the day. Group Psychotherapy - Wellness Assessment   Alexis Loaiza attended group on the Wellness Assessment. The group engaged in the wellness assessment, which evaluates progress on several different areas of wellness/wellbeing: physical, emotional, cognitive, vocational, social and spiritual. Clients rated their progress and discussed areas that need work. By completing and discussing areas of progress, goals and challenges, members are connected and reminded that, in their mental health struggle, they are not alone. Topics of discussion revolved around setting goals, coping with change, methods to achieve goals. Alexis Loaiza continues to make progress towards goals through participation in group activity and personal disclosures. Tereza Molina reflected on how her score are higher than expected on her wellness assessment.   Continue to run daily group psychotherapy to meet treatment needs and encourage Alexis Loaiza to practice skills outside of program.      Tx Plan Objective:1.1,1.2,1.4  Therapist: PABLO [facility]

## 2023-08-25 NOTE — PSYCH
Visit Time    Visit Start Time: 0930  Visit Stop Time: 2720  Total Visit Duration: 60 minutes    Subjective:     Patient ID: Angi Rider is a 46 y.o. y.o. female. [facility] Clinical Progress Notes      Specialized Services Documentation  Therapist must complete separate progress note for each specific clinical activity in which the individual participated during the day. Allied Therapy  Angi Rider actively shared in Music Therapy group focused on radical acceptance and the concept of letting go. During group discussion, Casandra Del Valle participated identifying the challenge with not "fixing" life for those we love. Group reinforced importance of consistently caring for one’s self and progressive muscle relaxation practiced. Song reinforced topic along with allowed for personal identification of emotions;things one wishes to "let go of". She shared related to the support of her spouse and her need to work on her medical diagnosis versus fighting it. Positive progress toward goal.  Continue AT to increase self awareness and use of skills consistently.    Tx Plan Objective: 1.1, Therapist:  Cora SCHOFIELD

## 2023-08-28 ENCOUNTER — TELEPHONE (OUTPATIENT)
Dept: OTHER | Facility: OTHER | Age: 48
End: 2023-08-28

## 2023-08-28 ENCOUNTER — DOCUMENTATION (OUTPATIENT)
Dept: PSYCHOLOGY | Facility: CLINIC | Age: 48
End: 2023-08-28

## 2023-08-28 ENCOUNTER — HOSPITAL ENCOUNTER (EMERGENCY)
Facility: HOSPITAL | Age: 48
Discharge: HOME | End: 2023-08-28
Attending: EMERGENCY MEDICINE
Payer: COMMERCIAL

## 2023-08-28 ENCOUNTER — APPOINTMENT (OUTPATIENT)
Dept: PSYCHOLOGY | Facility: CLINIC | Age: 48
End: 2023-08-28
Payer: COMMERCIAL

## 2023-08-28 ENCOUNTER — NURSE TRIAGE (OUTPATIENT)
Age: 48
End: 2023-08-28

## 2023-08-28 ENCOUNTER — HOSPITAL ENCOUNTER (OUTPATIENT)
Dept: INFUSION CENTER | Facility: HOSPITAL | Age: 48
Discharge: HOME | End: 2023-08-28
Attending: INTERNAL MEDICINE
Payer: COMMERCIAL

## 2023-08-28 VITALS
HEART RATE: 96 BPM | TEMPERATURE: 97.2 F | SYSTOLIC BLOOD PRESSURE: 138 MMHG | DIASTOLIC BLOOD PRESSURE: 82 MMHG | OXYGEN SATURATION: 98 % | BODY MASS INDEX: 25.75 KG/M2 | WEIGHT: 154.76 LBS | RESPIRATION RATE: 18 BRPM

## 2023-08-28 VITALS
DIASTOLIC BLOOD PRESSURE: 83 MMHG | TEMPERATURE: 97.7 F | SYSTOLIC BLOOD PRESSURE: 127 MMHG | HEART RATE: 91 BPM | RESPIRATION RATE: 17 BRPM

## 2023-08-28 DIAGNOSIS — K62.5 RECTAL BLEEDING: Primary | ICD-10-CM

## 2023-08-28 DIAGNOSIS — E44.1 MILD PROTEIN-CALORIE MALNUTRITION (HCC): ICD-10-CM

## 2023-08-28 DIAGNOSIS — R19.7 VOMITING AND DIARRHEA: ICD-10-CM

## 2023-08-28 DIAGNOSIS — R11.10 VOMITING AND DIARRHEA: ICD-10-CM

## 2023-08-28 DIAGNOSIS — R63.0 ANOREXIA: Primary | ICD-10-CM

## 2023-08-28 DIAGNOSIS — R10.11 RIGHT UPPER QUADRANT ABDOMINAL PAIN: ICD-10-CM

## 2023-08-28 LAB
ALBUMIN SERPL BCP-MCNC: 4.1 G/DL (ref 3.5–5)
ALP SERPL-CCNC: 56 U/L (ref 34–104)
ALT SERPL W P-5'-P-CCNC: 26 U/L (ref 7–52)
ANION GAP SERPL CALCULATED.3IONS-SCNC: 10 MMOL/L
APTT PPP: 23 SECONDS (ref 23–37)
AST SERPL W P-5'-P-CCNC: 22 U/L (ref 13–39)
BASOPHILS # BLD AUTO: 0.11 THOUSANDS/ÂΜL (ref 0–0.1)
BASOPHILS NFR BLD AUTO: 1 % (ref 0–1)
BILIRUB DIRECT SERPL-MCNC: 0.13 MG/DL (ref 0–0.2)
BILIRUB SERPL-MCNC: 0.31 MG/DL (ref 0.2–1)
BILIRUB UR QL STRIP: NEGATIVE
BUN SERPL-MCNC: 13 MG/DL (ref 5–25)
CALCIUM SERPL-MCNC: 9.4 MG/DL (ref 8.4–10.2)
CHLORIDE SERPL-SCNC: 101 MMOL/L (ref 96–108)
CLARITY UR: CLEAR
CO2 SERPL-SCNC: 29 MMOL/L (ref 21–32)
COLOR UR: YELLOW
CREAT SERPL-MCNC: 0.76 MG/DL (ref 0.6–1.3)
EOSINOPHIL # BLD AUTO: 0.14 THOUSAND/ÂΜL (ref 0–0.61)
EOSINOPHIL NFR BLD AUTO: 1 % (ref 0–6)
ERYTHROCYTE [DISTWIDTH] IN BLOOD BY AUTOMATED COUNT: 14.6 % (ref 11.6–15.1)
GFR SERPL CREATININE-BSD FRML MDRD: 91 ML/MIN/1.73SQ M
GLUCOSE SERPL-MCNC: 173 MG/DL (ref 65–140)
GLUCOSE UR STRIP-MCNC: ABNORMAL MG/DL
HCT VFR BLD AUTO: 39.1 % (ref 34.8–46.1)
HGB BLD-MCNC: 12.6 G/DL (ref 11.5–15.4)
HGB UR QL STRIP.AUTO: NEGATIVE
IMM GRANULOCYTES # BLD AUTO: 0.03 THOUSAND/UL (ref 0–0.2)
IMM GRANULOCYTES NFR BLD AUTO: 0 % (ref 0–2)
INR PPP: 0.86 (ref 0.84–1.19)
KETONES UR STRIP-MCNC: NEGATIVE MG/DL
LEUKOCYTE ESTERASE UR QL STRIP: NEGATIVE
LIPASE SERPL-CCNC: 29 U/L (ref 11–82)
LYMPHOCYTES # BLD AUTO: 1.22 THOUSANDS/ÂΜL (ref 0.6–4.47)
LYMPHOCYTES NFR BLD AUTO: 12 % (ref 14–44)
MCH RBC QN AUTO: 29.6 PG (ref 26.8–34.3)
MCHC RBC AUTO-ENTMCNC: 32.2 G/DL (ref 31.4–37.4)
MCV RBC AUTO: 92 FL (ref 82–98)
MONOCYTES # BLD AUTO: 0.63 THOUSAND/ÂΜL (ref 0.17–1.22)
MONOCYTES NFR BLD AUTO: 6 % (ref 4–12)
NEUTROPHILS # BLD AUTO: 8.52 THOUSANDS/ÂΜL (ref 1.85–7.62)
NEUTS SEG NFR BLD AUTO: 80 % (ref 43–75)
NITRITE UR QL STRIP: NEGATIVE
NRBC BLD AUTO-RTO: 0 /100 WBCS
PH UR STRIP.AUTO: 5.5 [PH]
PLATELET # BLD AUTO: 508 THOUSANDS/UL (ref 149–390)
PMV BLD AUTO: 8.5 FL (ref 8.9–12.7)
POTASSIUM SERPL-SCNC: 3.5 MMOL/L (ref 3.5–5.3)
PROT SERPL-MCNC: 7.1 G/DL (ref 6.4–8.4)
PROT UR STRIP-MCNC: NEGATIVE MG/DL
PROTHROMBIN TIME: 11.9 SECONDS (ref 11.6–14.5)
RBC # BLD AUTO: 4.25 MILLION/UL (ref 3.81–5.12)
SODIUM SERPL-SCNC: 140 MMOL/L (ref 135–147)
SP GR UR STRIP.AUTO: <=1.005 (ref 1–1.03)
UROBILINOGEN UR QL STRIP.AUTO: 0.2 E.U./DL
WBC # BLD AUTO: 10.65 THOUSAND/UL (ref 4.31–10.16)

## 2023-08-28 PROCEDURE — 96360 HYDRATION IV INFUSION INIT: CPT

## 2023-08-28 PROCEDURE — 85730 THROMBOPLASTIN TIME PARTIAL: CPT | Performed by: EMERGENCY MEDICINE

## 2023-08-28 PROCEDURE — 81003 URINALYSIS AUTO W/O SCOPE: CPT | Performed by: EMERGENCY MEDICINE

## 2023-08-28 PROCEDURE — 99285 EMERGENCY DEPT VISIT HI MDM: CPT

## 2023-08-28 PROCEDURE — 83690 ASSAY OF LIPASE: CPT

## 2023-08-28 PROCEDURE — 80076 HEPATIC FUNCTION PANEL: CPT

## 2023-08-28 PROCEDURE — 36415 COLL VENOUS BLD VENIPUNCTURE: CPT | Performed by: EMERGENCY MEDICINE

## 2023-08-28 PROCEDURE — 85610 PROTHROMBIN TIME: CPT | Performed by: EMERGENCY MEDICINE

## 2023-08-28 PROCEDURE — 80048 BASIC METABOLIC PNL TOTAL CA: CPT | Performed by: EMERGENCY MEDICINE

## 2023-08-28 PROCEDURE — 85025 COMPLETE CBC W/AUTO DIFF WBC: CPT | Performed by: EMERGENCY MEDICINE

## 2023-08-28 PROCEDURE — 99284 EMERGENCY DEPT VISIT MOD MDM: CPT | Performed by: EMERGENCY MEDICINE

## 2023-08-28 RX ORDER — TRAMADOL HYDROCHLORIDE 50 MG/1
50 TABLET ORAL ONCE
Status: COMPLETED | OUTPATIENT
Start: 2023-08-28 | End: 2023-08-28

## 2023-08-28 RX ADMIN — TRAMADOL HYDROCHLORIDE 50 MG: 50 TABLET ORAL at 09:43

## 2023-08-28 RX ADMIN — SODIUM CHLORIDE 1000 ML: 0.9 INJECTION, SOLUTION INTRAVENOUS at 11:54

## 2023-08-28 NOTE — ED ATTENDING ATTESTATION
8/28/2023  I, Donnie Jurado MD, saw and evaluated the patient. I have discussed the patient with the resident/non-physician practitioner and agree with the resident's/non-physician practitioner's findings, Plan of Care, and MDM as documented in the resident's/non-physician practitioner's note, except where noted. All available labs and Radiology studies were reviewed. I was present for key portions of any procedure(s) performed by the resident/non-physician practitioner and I was immediately available to provide assistance. At this point I agree with the current assessment done in the Emergency Department. I have conducted an independent evaluation of this patient a history and physical is as follows:    26-year-old female, presents with red blood in toilet after going to bathroom earlier today, unsure if it was from urine or rectal bleeding. Patient follows with GI for inflammatory bowel disease, has had chronic pain in right upper abdomen with extensive evaluation including MRI and ultrasound. On exam, patient appears comfortable and in no distress. Abdomen nondistended, soft, mild right upper quadrant tenderness with no rebound or guarding. ED Course     Patient with no evidence of acute bleeding ED, rectal exam showed brown stool with no gross blood, urine appears clear, urinalysis with no signs of bleeding or infection. Lab results reviewed with no acute abnormality. Tolerating oral intake in ED. Patient instructed to continue medications, follow-up with her GI doctor and primary doctor for further evaluation.     Critical Care Time  Procedures

## 2023-08-28 NOTE — PROGRESS NOTES
Subjective:     Patient ID: Logan Del Castillo is a 46 y.o. female. [facility] Clinical Progress Notes      Specialized Services Documentation  Therapist must complete separate progress note for each specific clinical activity in which the individual participated during the day. Case Management Note    [facility]    Current suicide risk : Unable to assess due to absence. Logan Del Castillo was excused from  program today 08/28/23. Porter Gerber reports on a voicemail that she called her GI doctor and he recommend that she goes to the ER due to sweating, abdominal pain, internal bleeding. CM contacted oLgan Del Castillo at [phone]. Medications changes/added/denied? No    Treatment session number: N/A    Individual Case Management Visit provided today? No    [facility] follow up physician's orders: None at this time.

## 2023-08-28 NOTE — PSYCH
Subjective:     Patient ID: Tan Weinberg is a 46 y.o. female. [facility] Clinical Progress Notes      Specialized Services Documentation  Therapist must complete separate progress note for each specific clinical activity in which the individual participated during the day. Group Psychotherapy  This group was facilitated virtually in a private office using HIPAA Compliant and Approved Microsoft Teams. Tan Weinberg  consented to the use of tele-video modality of treatment. ([id number]) Tan Weinberg actively *** shared in psychoeducational group which focused on nutrition to improve physical and mental wellbeing. Topics included:  • How our diet affects our mental health  • Mental health benefits of healthy eating  • Barriers to eating healthy  • Ways to overcome barriers  • Macro and Micro nutrients   • Appropriate serving sizes for all food groups as well as benefits to eating for health       The group then viewed a HANNA talk video titled "The Brain Intervention at the End of our [facility]" by Dr Raphael De La Rosa. They then discussed ideas on how to improve on their nutrition. *** progress toward goal noted. Continue psychoeducation to educate on the importance of making nutrition a priority. Tx Plan Objectives: 1.1,1.2    Therapist: Clary CARDOZA        Education Therapy   [id number] Tna Weinberg {[facility] ACTIVELY:[id number]} shared in morning assessment and goal review. Presented as {Readiness to Learin} related to readiness to learn. Tan Weinberg {DID/DID [id number]} complete goal from last treatment day identifying gaining ***. did not present with any barriers to learning. [address] [place] [address] engaged throughout the treatment day. Was engaged in learning related to Illness, Medication, Aftercare, and Wellness Tools. Staff utilized Verbal, Written, A/V, and Demonstration teaching methods. Tan Weinberg shared area of learning and set a goal for outside of program to ***.       Tx Plan Objective: 1.1,1.2,1.4 Therapist:  Darren ROLDANN RN

## 2023-08-28 NOTE — ED PROVIDER NOTES
History  Chief Complaint   Patient presents with   • Black or Bloody Stool     Pt states when she went to the bathroom this morning there was blood in the toilet. Pt states "I haven't been feeling well lately"     Patient is a 68-year-old female with a past medical history of Crohn's, ulcerative colitis, colon polyp, GERD, IBS, lactose intolerance, mast cell disease, bladder tumors s/p removal in July 2023 who presents to the ER today with complaints of rectal bleed. Patient states that this morning when she was using the bathroom she noticed christopher blood while wiping, and found pink water in the commode. Patient is not entirely sure if it is coming from her urine or her rectal area. Patient states that she has a history of Crohn's/ulcerative colitis and has been following up with GI regularly. She states that she has never had this kind of episode in the recent past.  Patient also reports noticing petechial rash that started last night. Patient states that she has not been feeling herself lately. She reports bumping onto surfaces around her house. She reports ankle and hand swelling. Patient also reports daily headaches more towards midday, and also reports blurry eyes. Patient states she had 2 bowel movements today. Her first bowel movement was normal and her second bowel movement was more runny. Patient also complains of right upper quadrant abdominal pain below her ribs. She states this pain has been constant. Patient otherwise denies other acute symptoms. Patient reports partial admission and is currently on an outpatient programs for mental health. Prior to Admission Medications   Prescriptions Last Dose Informant Patient Reported? Taking? ARIPiprazole (ABILIFY) 10 mg tablet   No No   Sig: Take 1 tablet (10 mg total) by mouth daily Do not start before August 16, 2023.    B-D 3CC LUER-SHAMEKA SYR 25GX1" 25G X 1" 3 ML MISC  Self Yes No   Cholecalciferol 10 MCG /0.025ML LIQD  Self No No   Sig: Take 1,000 Units by mouth 2 (two) times a day   EpiPen 2-Malachi 0.3 MG/0.3ML SOAJ   Yes No   Sig: INJECT 1 PEN INTO THE MUSCLE AS NEEDED FOR ANAPHALAXIS   Lactobacillus (PROBIOTIC ACIDOPHILUS PO)  Self Yes No   Sig: Take by mouth   Lactobacillus-Inulin ([facility]) CAPS  Self Yes No   Sig: Take 200 mg by mouth daily   Melatonin 3 MG SUBL   Yes No   NON FORMULARY  Self Yes No   Sig: immunoglobin sq 6g 30 ml once a week   TechLite Lancets MISC  Self Yes No   butalbital-acetaminophen-caffeine (FIORICET,ESGIC) -40 mg per tablet   No No   Sig: Take 1 tablet by mouth every 8 (eight) hours as needed for migraine   cetirizine (ZyrTEC) 10 mg tablet  Self Yes No   Sig: Take 20 mg by mouth daily   diclofenac (VOLTAREN) 75 mg EC tablet   Yes No   famotidine (PEPCID) 40 MG tablet   No No   Sig: Take 0.5 tablets (20 mg total) by mouth daily   hydrOXYzine HCL (ATARAX) 25 mg tablet   Yes No   levalbuterol (XOPENEX HFA) 45 mcg/act inhaler   No No   Sig: Inhale 1-2 puffs every 4 (four) hours as needed for shortness of breath   levothyroxine 100 mcg tablet  Self Yes No   Sig: Take 100 mcg by mouth every other day   liothyronine (CYTOMEL) 5 mcg tablet   No No   Sig: Take 1 tablet (5 mcg total) by mouth daily Do not start before June 14, 2023. melatonin 3 mg   No No   Sig: Take 1 tablet (3 mg total) by mouth daily at bedtime   montelukast (SINGULAIR) 10 mg tablet   No No   Sig: Take 1 tablet (10 mg total) by mouth daily at bedtime   nystatin (MYCOSTATIN) 500,000 units/5 mL suspension  Self No No   Sig: Swish and spit 5 mL (500,000 Units total) 4 (four) times a day   omalizumab (XOLAIR) 150 mg   No No   Sig: Inject 2.4 mL (300 mg total) under the skin every 28 days   pantoprazole (PROTONIX) 40 mg tablet  Self No No   Sig: TAKE ONE TABLET BY MOUTH TWICE A DAY   predniSONE 20 mg tablet   No No   Sig: Take 1 tablet (20 mg total) by mouth daily Take as instructed by physician.  20 mg three times a day for 3 days, then 20 mg twice daily   sertraline (ZOLOFT) 100 mg tablet   No No   Sig: Take 1.5 tablets (150 mg total) by mouth daily Do not start before August 16, 2023. traMADol (Ultram) 50 mg tablet   No No   Sig: Take 1 tablet (50 mg total) by mouth every 12 (twelve) hours as needed for moderate pain or severe pain   traZODone (DESYREL) 50 mg tablet   No No   Sig: Take 1 tablet (50 mg total) by mouth daily at bedtime      Facility-Administered Medications: None       Past Medical History:   Diagnosis Date   • Anemia 2007   • Anxiety    • Asthma    • Bile duct stricture 2014    Sphincter of oddi dysfunction   • Chronic kidney disease    • Colon polyp 1998   • Crohn's colitis ([address])    • Deep vein thrombosis (HCC)    • Disease of thyroid gland    • 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 pt denies, documented in cardio note   • Pancreatitis     sphinger of    • Psychiatric disorder    • RA (rheumatoid arthritis) ([address])    • Seizures ([address])    • Ulcerative colitis ([address])    • Visual impairment        Past Surgical History:   Procedure Laterality Date   • ABDOMINAL SURGERY  2017   • APPENDECTOMY     • CHOLECYSTECTOMY     • COLONOSCOPY  2019   • CYSTOSCOPY N/A 6/8/2023    Procedure: CYSTOSCOPY, mini TURBT with fulgeration;  Surgeon: John Davenport MD;  Location: [facility];  Service: Urology   • EGD AND COLONOSCOPY N/A 09/12/2017    Procedure: EGD AND COLONOSCOPY;  Surgeon: Joanne Calloway MD;  [facility]: [facility]; Service: Gastroenterology   • ERCP N/A 09/15/2017    Procedure: ENDOSCOPIC RETROGRADE CHOLANGIOPANCREATOGRAPHY (ERCP); Surgeon: Mark Montero MD;  [facility];   Service: Gastroenterology   • HYSTERECTOMY     • KNEE SURGERY Right    • LAPAROSCOPIC ENDOMETRIOSIS FULGURATION     • ORIF ANKLE FRACTURE Left    • MD ARTHRS KNE SURG W/MENISCECTOMY MED/LAT W/SHVG Left 05/12/2017    Procedure: POSSIBLE MEDIAL MENISECTOMY;  Surgeon: Gala Tong MD;  Location: [facility];  Service: Orthopedics   • MD ARTHRS KNEE DEBRIDEMENT/SHAVING ARTCLR CRTLG Left 05/12/2017    Procedure: KNEE ARTHROSCOPY EVALUATION, CHONDROPLASTY;  Surgeon: Gala Tong MD;  Location: [facility];  Service: Orthopedics   • MD LAPS ABD PRTM&OMENTUM DX W/WO [facility] BR/WA [address] [facility] [address] N/A 12/12/2017    Procedure: LAPAROSCOPY DIAGNOSTIC  WITH LYSIS OF ADHESIONS;  Surgeon: Essie Mcardle, DO;  Location:  MAIN OR;  Service: General   • UPPER GASTROINTESTINAL ENDOSCOPY  2019       Family History   Problem Relation Age of Onset   • Ulcerative colitis Mother    • Arthritis Mother    • Colon polyps Mother    • Heart failure Father    • Neuropathy Father    • Macular degeneration Father    • Diabetes Father    • Early death Father    • Vision loss Father    • Asthma Daughter    • Hypothyroidism Daughter    • Heart disease Maternal Grandmother    • Arthritis Maternal Grandmother    • No Known Problems Maternal Grandfather    • Arthritis Paternal Grandmother    • Macular degeneration Paternal Grandmother    • Vision loss Paternal Grandmother    • Lymphoma Paternal Grandfather    • Cancer Paternal Grandfather    • Early death Paternal Grandfather    • Breast cancer Maternal Aunt    • Cancer Maternal Aunt    • Miscarriages / Stillbirths Maternal Aunt    • Heart failure Paternal Aunt    • Heart failure Paternal Aunt    • Irritable bowel syndrome Paternal Aunt    • Breast cancer Paternal Aunt    • Breast cancer additional onset Paternal Aunt    • Hypothyroidism Paternal Aunt    • Cancer Paternal Aunt    • No Known Problems Son    • No Known Problems Son    • Kidney disease Brother    • Depression Paternal Uncle    • Early death Paternal Uncle      I have reviewed and agree with the history as documented. E-Cigarette/Vaping   • E-Cigarette Use Never User      E-Cigarette/Vaping Substances   • Nicotine No    • THC No    • CBD No    • Flavoring No    • Other No    • Unknown No      Social History     Tobacco Use   • Smoking status: Never   • Smokeless tobacco: Never   Vaping Use   • Vaping Use: Never used   Substance Use Topics   • Alcohol use: Never   • Drug use: Never        Review of Systems   Constitutional: Negative for chills and fever. HENT: Negative for ear pain and sore throat. Eyes: Negative for pain and visual disturbance. Respiratory: Negative for cough and shortness of breath. Cardiovascular: Negative for chest pain and palpitations. Gastrointestinal: Positive for abdominal pain and anal bleeding. Negative for vomiting. Genitourinary: Negative for dysuria and hematuria. Musculoskeletal: Negative for arthralgias and back pain. Skin: Positive for color change. Negative for rash. Neurological: Negative for seizures and syncope. All other systems reviewed and are negative. Physical Exam  ED Triage Vitals [08/28/23 0851]   Temperature Pulse Respirations Blood Pressure SpO2   (!) 97.2 °F (36.2 °C) 103 16 138/67 99 %      Temp Source Heart Rate Source Patient Position - Orthostatic VS BP Location FiO2 (%)   Temporal Monitor Sitting Left arm --      Pain Score       7             Orthostatic Vital Signs  Vitals:    08/28/23 0851 08/28/23 0930 08/28/23 1000 08/28/23 1100   BP: 138/67 134/86 136/96 138/82   Pulse: 103 104 93 96   Patient Position - Orthostatic VS: Sitting Lying Lying Sitting       Physical Exam  Vitals and nursing note reviewed. Constitutional:       General: She is not in acute distress. Appearance: She is well-developed. HENT:      Head: Normocephalic and atraumatic. Eyes:      Conjunctiva/sclera: Conjunctivae normal.   Cardiovascular:      Rate and Rhythm: Normal rate and regular rhythm.       Heart sounds: No murmur heard.  Pulmonary:      Effort: Pulmonary effort is normal. No respiratory distress. Breath sounds: Normal breath sounds. Abdominal:      Palpations: Abdomen is soft. Tenderness: There is no abdominal tenderness. Genitourinary:     Rectum: No mass, tenderness or anal fissure. Comments: No christopher blood visualized on per rectal examination  Musculoskeletal:         General: No swelling. Cervical back: Neck supple. Skin:     General: Skin is warm and dry. Capillary Refill: Capillary refill takes less than 2 seconds. Findings: Rash (few scattered petechial rash, 1 purpuric appearing rash on her right arm) present. Neurological:      Mental Status: She is alert and oriented to person, place, and time.    Psychiatric:         Mood and Affect: Mood normal.         ED Medications  Medications   traMADol (ULTRAM) tablet 50 mg (50 mg Oral Given 8/28/23 0943)       Diagnostic Studies  Results Reviewed     Procedure Component Value Units Date/Time    Hepatic function panel [[id number]]  (Normal) Collected: 08/28/23 0921    Lab Status: Final result Specimen: Blood from Arm, Right Updated: 08/28/23 1043     Total Bilirubin 0.31 mg/dL      Bilirubin, Direct 0.13 mg/dL      Alkaline Phosphatase 56 U/L      AST 22 U/L      ALT 26 U/L      Total Protein 7.1 g/dL      Albumin 4.1 g/dL     Lipase [[id number]]  (Normal) Collected: 08/28/23 0921    Lab Status: Final result Specimen: Blood from Arm, Right Updated: 08/28/23 1043     Lipase 29 u/L     CBC and differential [[id number]]  (Abnormal) Collected: 08/28/23 0939    Lab Status: Final result Specimen: Blood from Hand, Right Updated: 08/28/23 0944     WBC 10.65 Thousand/uL      RBC 4.25 Million/uL      Hemoglobin 12.6 g/dL      Hematocrit 39.1 %      MCV 92 fL      MCH 29.6 pg      MCHC 32.2 g/dL      RDW 14.6 %      MPV 8.5 fL      Platelets 804 Thousands/uL      nRBC 0 /100 WBCs      Neutrophils Relative 80 %      Immat GRANS % 0 %      Lymphocytes Relative 12 %      Monocytes Relative 6 %      Eosinophils Relative 1 %      Basophils Relative 1 %      Neutrophils Absolute 8.52 Thousands/µL      Immature Grans Absolute 0.03 Thousand/uL      Lymphocytes Absolute 1.22 Thousands/µL      Monocytes Absolute 0.63 Thousand/µL      Eosinophils Absolute 0.14 Thousand/µL      Basophils Absolute 0.11 Thousands/µL     Basic metabolic panel [[id number]]  (Abnormal) Collected: 08/28/23 0921    Lab Status: Final result Specimen: Blood from Arm, Right Updated: 08/28/23 0943     Sodium 140 mmol/L      Potassium 3.5 mmol/L      Chloride 101 mmol/L      CO2 29 mmol/L      ANION GAP 10 mmol/L      BUN 13 mg/dL      Creatinine 0.76 mg/dL      Glucose 173 mg/dL      Calcium 9.4 mg/dL      eGFR 91 ml/min/1.73sq m     Narrative:      [place] guidelines for Chronic Kidney Disease (CKD):   •  Stage 1 with normal or high GFR (GFR > 90 mL/min/1.73 square meters)  •  Stage 2 Mild CKD (GFR = 60-89 mL/min/1.73 square meters)  •  Stage 3A Moderate CKD (GFR = 45-59 mL/min/1.73 square meters)  •  Stage 3B Moderate CKD (GFR = 30-44 mL/min/1.73 square meters)  •  Stage 4 Severe CKD (GFR = 15-29 mL/min/1.73 square meters)  •  Stage 5 End Stage CKD (GFR <15 mL/min/1.73 square meters)  Note: GFR calculation is accurate only with a steady state creatinine    Protime-INR [[id number]]  (Normal) Collected: 08/28/23 0921    Lab Status: Final result Specimen: Blood from Arm, Right Updated: 08/28/23 0936     Protime 11.9 seconds      INR 0.86    APTT [[id number]]  (Normal) Collected: 08/28/23 0921    Lab Status: Final result Specimen: Blood from Arm, Right Updated: 08/28/23 0936     PTT 23 seconds     UA w Reflex to Microscopic w Reflex to Culture [[id number]]  (Abnormal) Collected: 08/28/23 0923    Lab Status: Final result Specimen: Urine, Clean Catch Updated: 08/28/23 0928     Color, UA Yellow     Clarity, UA Clear     Specific Gravity, UA <=1.005     pH, UA 5.5     Leukocytes, UA Negative     Nitrite, UA Negative     Protein, UA Negative mg/dl      Glucose,  (1/10%) mg/dl      Ketones, UA Negative mg/dl      Urobilinogen, UA 0.2 E.U./dl      Bilirubin, UA Negative     Occult Blood, UA Negative                 No orders to display         Procedures  Procedures      ED Course  ED Course as of 08/28/23 1119   Mon Aug 28, 2023   1314 Platelet Count(!): 819       Medical Decision Making  Patient is a 69-year-old female with a past medical history of Crohn's, ulcerative colitis, colon polyp, GERD, IBS, lactose intolerance, mast cell disease, bladder tumors s/p removal in July 2023 who presents to the ER today with complaints of rectal bleed. Given her history, we will check her blood work namely CBC., BMP,  PT/INR, UA to rule out bloody urine. UA was negative for blood. CBC showed mildly elevated leukocytes was not impressive. PT /PTT within normal limits. LFT, lipase wnl. Patient was hemodynamically stable. Platelets were found to be high likely secondary to the inflammatory process. Based on review of her chart, it appears that patient has chronic right upper quadrant pain. RUQ US 7/2023 revealed dilated common bile duct likely post-surgical.  MCRP was done 7/2023 which revealed no choledocholithiasis. The distal/periampullary portion of the common bile duct was not visualized suggesting distal biliary stricture. Hepatic steatosis noted. Per prior inpatient progress note it appears that her right upper quadrant pain was suspected to have a psychosomatic component. Overall, lab work appears benign. We recommend that she follows up with her gastroenterologist as soon as possible. Given benign lab work, hemodynamically stable condition, we will discharge her for outpatient follow-up. Amount and/or Complexity of Data Reviewed  Labs: ordered. Decision-making details documented in ED Course. Risk  Prescription drug management.         Disposition  Final diagnoses:   Rectal bleeding   Right upper quadrant abdominal pain     Time reflects when diagnosis was documented in both MDM as applicable and the Disposition within this note     Time User Action Codes Description Comment    8/28/2023 10:28 AM Akanksha Ingram Add [K62.5] Rectal bleeding     8/28/2023 11:15 AM Akanksha Ingram Add [R10.11] Right upper quadrant abdominal pain       ED Disposition     ED Disposition   Discharge    Condition   Stable    Date/Time   Mon Aug 28, 2023 11:14 AM    Comment   Francisco J Martines discharge to home/self care. Follow-up Information     Follow up With Specialties Details Why [address], DO Family Medicine In 2 weeks  [address]      Leslie Hutchinson MD Gastroenterology Schedule an appointment as soon as possible for a visit   [address]  [phone]            Patient's Medications   Discharge Prescriptions    No medications on file         PDMP Review       Value Time User    PDMP Reviewed  Yes 7/24/2023  1:39 PM Nereida Fraga, [address] [place] [address]           ED Provider  Attending physically available and evaluated Francisco J Martines. I managed the patient along with the ED Attending.     Electronically Signed by         Akanksha Ingram MD  08/28/23 4210

## 2023-08-28 NOTE — TELEPHONE ENCOUNTER
Reason for Disposition  • MODERATE rectal bleeding (small blood clots, passing blood without stool, or toilet water turns red) more than once a day    Answer Assessment - Initial Assessment Questions  1. APPEARANCE of BLOOD: "What color is it?" "Is it passed separately, on the surface of the stool, or mixed in with the stool?"      BRB and dark red  2. AMOUNT: "How much blood was passed?"       Moderate amount with stool and in toilet bowl  3. FREQUENCY: "How many times has blood been passed with the stools?"       Every bowel movement  4. ONSET: "When was the blood first seen in the stools?" (Days or weeks)     First noticed while in hospital in July  5. DIARRHEA: "Is there also some diarrhea?" If Yes, ask: "How many diarrhea stools were passed in past 24 hours?"     Loose stool, greasy, floating with blood  6. CONSTIPATION: "Do you have constipation?" If Yes, ask: "How bad is it?"    no  7. RECURRENT SYMPTOMS: "Have you had blood in your stools before?" If Yes, ask: "When was the last time?" and "What happened that time?"       Yes July  8. BLOOD THINNERS: "Do you take any blood thinners?" (e.g., Coumadin/warfarin, Pradaxa/dabigatran, aspirin)      no  9. OTHER SYMPTOMS: "Do you have any other symptoms?"  (e.g., abdominal pain, vomiting, dizziness, fever)     Fatigue, no energy, "just don't feel well"  10. PREGNANCY: "Is there any chance you are pregnant?" "When was your last menstrual period?"        no    Protocols used: RECTAL BLEEDING-ADULT-[place]  In hospital last month, new meds Trazadone, Abilify, Zoloft. Patient advised to report to ER for evaluation at this time. Patient will go to [facility].

## 2023-08-28 NOTE — ED NOTES
IV left in place for infusion and she is getting wheelchair ride up to 3rd floor.       Dorinda Rodriguez RN  08/28/23 5634

## 2023-08-28 NOTE — PROGRESS NOTES
Pt arrived on unit from ER via wheelchair with IV jelco intact to right hand. Good blood return noted.

## 2023-08-28 NOTE — TELEPHONE ENCOUNTER
P/t stated just left ED today and had bleeding and was told not an emergency and was told to make a appointment as soon a possible with my GI doctor.

## 2023-08-29 ENCOUNTER — APPOINTMENT (OUTPATIENT)
Dept: PSYCHOLOGY | Facility: CLINIC | Age: 48
End: 2023-08-29
Payer: COMMERCIAL

## 2023-08-29 ENCOUNTER — DOCUMENTATION (OUTPATIENT)
Dept: PSYCHOLOGY | Facility: CLINIC | Age: 48
End: 2023-08-29

## 2023-08-29 NOTE — PROGRESS NOTES
Subjective:     Patient ID: Alma Garcia is a 46 y.o. female. [facility] Clinical Progress Notes      Specialized Services Documentation  Therapist must complete separate progress note for each specific clinical activity in which the individual participated during the day. Case Management Note    EMMA Lopez    Current suicide risk : Unable to assess due to absence. Alma Garcia was excused from  program today 08/29/23 due to not feeling well. CM contacted Alma Garcia at [phone] and left a voicemail confirming that vm was received from her. [phone] Hinojosa reported having a bad migraine and reports messing the PCP about her syptoms and possible med change. Emmy Hinojosa reports making good use of her time by painting and gardening. Unable to update tx plan due to absence. Medications changes/added/denied? No    Treatment session number: N/A    Individual Case Management Visit provided today? No    [facility] follow up physician's orders: None at this time.

## 2023-08-29 NOTE — TELEPHONE ENCOUNTER
I connected with the patient via phone. Patient was seen in the ED 8/28 after seeing BRBPR and dark red blood in BM. Labs drawn. No images taken. Pt denies blood in the stool today. 1 BM- loose. 6 out of 10 abdominal pain RUQ, more relief when standing or lying down. Pt overall feels unwell. Currently on prednisone taper 10 mg. New medications started end of July- Zoloft, Abilify, Trazodone. Pending authorization for [employer]. Marked as urgent. OV 9/14 with Dr. Lore Ruiz. Pt requesting Dr. Lore Ruiz reply via [facility] message after he reviews her labs.

## 2023-08-30 ENCOUNTER — OFFICE VISIT (OUTPATIENT)
Dept: PSYCHOLOGY | Facility: CLINIC | Age: 48
End: 2023-08-30
Payer: COMMERCIAL

## 2023-08-30 DIAGNOSIS — F41.1 GENERALIZED ANXIETY DISORDER: Primary | ICD-10-CM

## 2023-08-30 DIAGNOSIS — F33.41 RECURRENT MAJOR DEPRESSIVE DISORDER IN PARTIAL REMISSION (HCC): ICD-10-CM

## 2023-08-30 PROCEDURE — G0176 OPPS/PHP;ACTIVITY THERAPY: HCPCS

## 2023-08-30 PROCEDURE — G0410 GRP PSYCH PARTIAL HOSP 45-50: HCPCS

## 2023-08-30 PROCEDURE — S0201 PARTIAL HOSPITALIZATION SERV: HCPCS

## 2023-08-30 PROCEDURE — G0177 OPPS/PHP; TRAIN & EDUC SERV: HCPCS

## 2023-08-30 NOTE — BH TREATMENT PLAN
Assessment/Plan:       Diagnoses and all orders for this visit:     Generalized anxiety disorder     Recurrent major depressive disorder, in partial remission ([facility])            Subjective:      Patient ID: Ward Shaver is a 46 y.o. female.     [facility] Treatment Plan   AREAS OF NEED: Generalized anxiety disorder and recurrent major depressive disorder in partial remission as evidenced by nervousness, restlessness, difficulties with concentration , can visualize things but can not physically do them, racing and ruminating thoughts, difficulties falling and staying asleep, crying due to health issues and relationship strain with mother.      Date Initiated: 08/18/23     Strengths: "connect with people, stubborn, kind, determined, organized, help people, well prepared, good in crisis mode, good under pressure"     LONG TERM GOAL:   Date Initiated: 08/18/23  1.0 I will identify and share three ways in which my day-to-day functioning has improved since attending program.  Target Date: 9/15/23  Completion Date:         SHORT TERM OBJECTIVES:      Date Initiated: 08/18/23  1.1 I will increase limit setting and boundaries by implementing assertiveness skills into my weekly routine in order to meet my own needs and build confidence. Revision Date:  8/29/23-absent; 8/30/23  Target Date: 8/29/23  Completion Date:      Date Initiated: 08/18/23  1.2 I will create a list identifying coping skills that I have learned and discuss how I plan to build them into my schedule. I will choose at least 1 different coping skill daily to practice   Revision Date: 8/29/23-absent; 8/30/23  Target Date: 8/29/23  Completion Date:     Date Initiated: 08/18/23  1.3 I will take medications as prescribed and share questions and concerns if arise.     Revision Date:8/29/23-absent; 8/30/23  Target Date: 8/29/23  Completion Date:      Date Initiated: 08/18/23  1.4 I will identify 3 ways my supports can assist in my recovery and agree to staff/support contact as indicated. Revision Date:8/29/23-absent; 8/30/23  Target Date: 8/29/23  Completion Date:            7 DAY REVISION:   1.5 I will learn about cognitive re-framing and identify cognitive coping mechanisms I have learned and begin to implement them into my weekly routine. Date Initiated: 8/30/23  Revision Date:   Target Date: 9/11/23  Completion Date:        PSYCHIATRY:  Date Initiated:  08/18/23  Medication Management and Education       Revision Date: :8/29/23-absent; 08/30/23        1.3 Continue medication management        The person(s) responsible for carrying out the plan is Georges Lal DO and Shaneka Laboy PA-C     NURSING/SYMPTOM EDUCATION:   Date Initiated: 08/18/23  1.1, 1.2. 1.3, 1.4 This RN/Or Therapist will provide wellness/symptoms and skill education groups three to five days weekly to educate Ashtyn Carrasco on signs and symptoms of diagnoses, skills to manage, and medication questions that will be addressed by the treatment team.    Revision date::8/29/23-absent; 08/30/23   1.1,1.2,1.3,1.4,1.5 Continue to encourage Ashtyn Carrasco to participate in wellness/symptoms and skills education groups daily to learn about symptoms, coping strategies and warning signs to promote relapse prevention. The person(s) responsible for carrying out the plan is EMMA To, LSW ; Shelton Correia RN, BSN     PSYCHOLOGY:   Date Initiated: 08/18/23       1.1, 1.2, 1.4 Provide psychotherapy group 5 times per week to allow opportunity for Ashtyn Carrasco  to explore stressors and ways of coping. Revision Date: :8/29/23-absent; 08/30/23   1.1,1.2,1.4,1.5  Continue to provide psychotherapy group daily to Ashtyn Carrasco and encourage sharing of stressors, skills and positive change.     The person(s) responsible for carrying out the plan is EMMA To,PAULINEW; [address]     ALLIED THERAPY:   Date Initiated: 08/18/23  1.1,1.2 Engage Ashtyn Carrasco in AT group 5 times weekly to encourage development and use of wellness tools to decrease symptoms and promote recovery through meaningful activity. Revision Date: :8/29/23-absent; 08/30/23   1.1,1.2,1.5 Continue to engage Sean Guerrero to participate in AT group to practice wellness tools within program and transfer to home sharing successes and barriers through healthy task involvement. The person(s) responsible for carrying out the plan is Lisbet Comer [facility] ; ATUL Alberto, [facility]     CASE MANAGEMENT:   Date Initiated: 08/18/23      1.0 This  will meet with Sean Guerrero  3-4 times weekly to assess treatment progress, discharge planning, connection to community supports and UR as indicated. Revision Date: :8/29/23-absent; 08/30/23   1.0 Continue to meet with Sean Guerrero 3-4 times weekly to assess growth, work toward goals, continued treatment needs, dc planning and use of supports. The person(s) responsible for carrying out the plan is Shari Del Rio     TREATMENT REVIEW/COMMENTS:      DISCHARGE CRITERIA: Identify 3 signs of progress and complete a crisis plan. DISCHARGE PLAN: Connect with identified outpatient providers.    Estimated Length of Stay: 10 treatment days                Diagnosis and Treatment Plan explained to Qasim Jensen relates understanding diagnosis and is agreeable to Treatment Plan.         CLIENT COMMENTS / Please share your thoughts, feelings, need and/or experiences regarding your treatment plan with Staff. Travis Navarrete see follow up note with comments.     Signatures can be found on [facility] Treatment plan consent form.

## 2023-08-30 NOTE — PSYCH
Visit Time    Visit Start Time: 0241  Visit Stop Time: 0306  Total Visit Duration: 60 minutes    Subjective:     Patient ID: Loretta Ross is a 46 y.o. female. [facility] Clinical Progress Notes      Specialized Services Documentation  Therapist must complete separate progress note for each specific clinical activity in which the individual participated during the day. Group Psychotherapy - Communication Skills   Loretta Ross attended group on communication skills. Today group members focused on the three types of communication:  • Passive  • Assertive  • Aggressive  Members reviewed “I” statements and their importance when using assertive communication skills. Members then participated in a communication practice activity where they interacted with other group members through their communication skills. The goal of today's group was to integrate and use new communication skills to have an effective and positive conversation with peers. Loretta Ross made good efforts towards progress goals which were displayed through participation and engagement in topic. Claus Maxwell shared frequently during the group discussion.  Continue to run daily group psychotherapy to meet treatment needs and encourage Loretta Ross to practice skills outside of program.    Tx Plan Objective: 1.1,1.2,1.4 Therapist: Stephie Vasquez

## 2023-08-30 NOTE — PSYCH
Subjective:     Patient ID: Melvina Lake is a 46 y.o. female. [facility] Clinical Progress Notes      Specialized Services Documentation  Therapist must complete separate progress note for each specific clinical activity in which the individual participated during the day. Group Psychotherapy Life Skills  ([id number])  Melvina Lake actively engaged in group focused on CBT tools. Group members practiced CBT strategies to manage stress by completing short-activities drawn from "CBT Counseling and Coaching card deck". Group members got to choose a card from one of the five topics seen most by clinicians. The topics include beating the blues, anecdotes for anxiety,relationship remedies, personal growth, motivational message. Mian Mendez chose a card from the anecdotes anxiety pile and discussed the action steps that they will take. Group members were encouraged to provide feedback on the exercise. Mian Mendez continues to make progress towards goals through verbal participation in group; to accomplish long term goals continue to utilize skills learned in programming. Continue with psychotherapy to educate and encourage use of wellness tools. Tx Plan Objective: 1.1,1.2 Therapist: PAULINE Vega    Education Therapy   [id number] Melvina Lake actively shared in morning assessment and goal review. Presented as Receptive related to readiness to learn. Melvina Lake did complete goal from last treatment day identifying gaining hope, education,advocacy, responsibility and support. did not present with any barriers to learning. [address] [place] [address] engaged throughout the treatment day. Was engaged in learning related to Illness, Medication, Aftercare, and Wellness Tools. Staff utilized Verbal, Written, A/V, and Demonstration teaching methods. Melvina Lake shared area of learning and set a goal for outside of program to reconnect with an old friend.       Tx Plan Objective: 1.1,1.2,1.4 Therapist:  Jorge Lord, MSW, LSW      Case Management Note    Johny Zamorano MSW    Current suicide risk : Low     [id number]- Joel Crespo reported about her weekends and expressed that she had an increase in energy and motivation. She reports that she has started doing her past hobbies. TX plan updated and reviewed. Joel Crespo informed this writer that she will be away on Vacation Sept. 7th-11th. Informed of med check and case management schedule. Medications changes/added/denied? No    Treatment session number: 7    Individual Case Management Visit provided today?  Yes     [facility] follow up physician's orders: none at this time

## 2023-08-30 NOTE — PSYCH
Subjective:     Patient ID: Angi Rider is a 46 y.o. female. [facility] Clinical Progress Notes      Specialized Services Documentation  Therapist must complete separate progress note for each specific clinical activity in which the individual participated during the day. Group Psychotherapy    ([id number]) Angi Rider attended group- Wellness Recovery Action Plan. Today, members focused on completing WRAP's toolbox then following specific sections and encouraged to fill in coping tools from their toolbox for planned responses:   · Daily Plan   · Identification of triggers and stressors  ·   Group members shared pieces of information from these sections and identified their importance. Writer encouraged the members of group to continue utilizing the packet to develop plans inside and outside of program. Good effort towards progress of goals which were displayed through participation and engagement in topic.   Treatment Plan Objectives: 1.1, 1.2  Therapist: Kimani CARDOZA RN

## 2023-08-31 ENCOUNTER — OFFICE VISIT (OUTPATIENT)
Dept: PSYCHOLOGY | Facility: CLINIC | Age: 48
End: 2023-08-31
Payer: COMMERCIAL

## 2023-08-31 ENCOUNTER — PREP FOR PROCEDURE (OUTPATIENT)
Dept: GASTROENTEROLOGY | Facility: CLINIC | Age: 48
End: 2023-08-31

## 2023-08-31 ENCOUNTER — TELEPHONE (OUTPATIENT)
Dept: GASTROENTEROLOGY | Facility: CLINIC | Age: 48
End: 2023-08-31

## 2023-08-31 DIAGNOSIS — K50.119 CROHN'S DISEASE OF COLON WITH COMPLICATION (HCC): Primary | ICD-10-CM

## 2023-08-31 DIAGNOSIS — F33.41 RECURRENT MAJOR DEPRESSIVE DISORDER IN PARTIAL REMISSION (HCC): ICD-10-CM

## 2023-08-31 DIAGNOSIS — D75.839 THROMBOCYTOSIS: ICD-10-CM

## 2023-08-31 DIAGNOSIS — F41.1 GENERALIZED ANXIETY DISORDER: Primary | ICD-10-CM

## 2023-08-31 PROCEDURE — G0177 OPPS/PHP; TRAIN & EDUC SERV: HCPCS

## 2023-08-31 PROCEDURE — G0176 OPPS/PHP;ACTIVITY THERAPY: HCPCS

## 2023-08-31 PROCEDURE — S0201 PARTIAL HOSPITALIZATION SERV: HCPCS

## 2023-08-31 PROCEDURE — G0410 GRP PSYCH PARTIAL HOSP 45-50: HCPCS

## 2023-08-31 RX ORDER — METHYLPREDNISOLONE SODIUM SUCCINATE 40 MG/ML
40 INJECTION, POWDER, LYOPHILIZED, FOR SOLUTION INTRAMUSCULAR; INTRAVENOUS ONCE
Start: 2023-08-31 | End: 2023-08-31

## 2023-08-31 NOTE — PSYCH
Visit Time    Visit Start Time: 0930  Visit Stop Time: 0031  Total Visit Duration: 60 minutes    Subjective:     Patient ID: Chepe Ruiz is a 46 y.o. female. [facility] Clinical Progress Notes      Specialized Services Documentation  Therapist must complete separate progress note for each specific clinical activity in which the individual participated during the day. Group Psychotherapy - Boundaries/Jessica   Chepe Ruiz attended group on Boundaries and [address]. The members refected on their ineffective methods and barriers to expressing needs. This writer facilitated a discussion on boundaries, relationships, and communication. Members were encouraged to practice setting boundaries through the completion of the Jessica outline both in group and outside of programming. Chepe Ruiz made good efforts towards progress goals which were displayed through participation, notetaking, and engagement in topic. Luiz Ana M shared her completed jessica exercise with the group. Continue to run daily group psychotherapy to meet treatment needs and encourage Chepe Ruiz to practice skills outside of program.    Tx Plan Objective: 1.1,1.2,1.4 Therapist: PABLO Amador [facility]    Education Therapy   [id number] Chepe Ruiz actively shared in morning assessment and goal review. Presented as Receptive related to readiness to learn. Chepe Ruiz did complete goal from last treatment day identifying a gain of [address], education, advocacy, responsibility, and support. did not present with any barriers to learning. [address] [place] [address] engaged throughout the treatment day. Was engaged in learning related to Illness, Medication, Aftercare, and Wellness Tools. Staff utilized Verbal, Written, A/V, and Demonstration teaching methods. Chepe Ruiz shared area of learning and set a goal for outside of program to practice jessica, work on her taxes, and go for a walk.       Tx Plan Objective: 1.1,1.2,1.4, Therapist: PABLO Amador [facility]

## 2023-08-31 NOTE — PSYCH
Subjective:     Patient ID: Alma Garcia is a 46 y.o. female. [facility] Clinical Progress Notes      Specialized Services Documentation  Therapist must complete separate progress note for each specific clinical activity in which the individual participated during the day. Allied Therapy [id number] Alma Garcia actively participated in music therapy group regarding mindfulness. The group participated in a name that tune exercise as an example of mindfulness. The group then read and discussed materials on mindfulness as well as areas they could use mindfulness in their current lives. The group then listened to "4'33" as an example of a mindfulness meditation. The group then read and discussed a handout on a music mindfulness exercise. When prompted, Emmy Hinojosa reported setting an intentional night time routine as a mindfulness tool they could use outside of program. Some effort noted towards treatment goal. Continue AT to encourage the development and proactive use of mindfulness coping tools.  Tx Plan Objective: 1.1,1.2,1.4, Therapist:  Kena Rocha, ATUL, MT-BC

## 2023-08-31 NOTE — PSYCH
Subjective:     Patient ID: Angi Rider is a 46 y.o. female. [facility] Clinical Progress Notes      Specialized Services Documentation    Group Psychotherapy - Open Processing  ([id number]) Angi Rider participated in an open processing group on increasing empowerment, increasing self-advocacy, and identifying supports and safe places, proving an opportunity to be heard when one might feel isolated in sharing their experiences.  spent time engaging group participants with open ended questions, reflective questions, and encouragement from other group members. In addition, it is important for the group to be able to receive multiple perspectives and feedback from other group members in a safe environment.  provided space for members to share as they felt comfortable, active listening, encouragement, and offered support to group when warranted. This structure helped support the group in feeling cathartic, gain some interpersonal learning, group cohesiveness, altruism, and instilling hope. Angi Rider actively participated in group discussion without prompting and exhibited good progress noted towards goal. Continue with open processing therapy to provide space to support individuals in building trust, gain corrective emotional experiences, and cultivate healthier interdependency with others.     Treatment Plan Objectives: 1.1, 1.2  Therapist: Severiano Maize, LSW

## 2023-08-31 NOTE — PSYCH
Subjective:     Patient ID: Kathy Nunez is a 46 y.o. female. [facility] Clinical Progress Notes      Specialized Services Documentation  Therapist must complete separate progress note for each specific clinical activity in which the individual participated during the day. Case Management Note    RANDY Walker    Current suicide risk : Low     [id number]- Shirin Bertrand reports that "my brain and heart want to do more but my body doesn't have the energy too. I think it is my meds." Shirin Bertrand plans to address her concerns with medication management provider. Discussed if her feeling this way could be her depression or anxiety. Shirin Bertrand did not believe it was MDD or INDERJIT causing her to feel this way. Shirin Bertrand discussed her excitement and anxiousness regarding going on vacation with her mom the 7th- 11th. Discussed Shirin Bertrand writing a Rana Goad to address the concerns with her mom and establishing boundaries for the trip. Discussed stepping down to three days a week. [employer] message sent to intake and  to schedule follow up appointment. Support group list given to Shirin Shmuelnicolasamarc. Medications changes/added/denied? No    Treatment session number: 8    Individual Case Management Visit provided today?  yes    [facility] follow up physician's orders: none at this time

## 2023-09-01 ENCOUNTER — OFFICE VISIT (OUTPATIENT)
Dept: PSYCHOLOGY | Facility: CLINIC | Age: 48
End: 2023-09-01
Payer: COMMERCIAL

## 2023-09-01 ENCOUNTER — OFFICE VISIT (OUTPATIENT)
Dept: PSYCHIATRY | Facility: CLINIC | Age: 48
End: 2023-09-01
Payer: COMMERCIAL

## 2023-09-01 DIAGNOSIS — F41.1 GENERALIZED ANXIETY DISORDER: Primary | Chronic | ICD-10-CM

## 2023-09-01 DIAGNOSIS — F32.3 MAJOR DEPRESSIVE DISORDER WITH PSYCHOTIC FEATURES (HCC): Primary | ICD-10-CM

## 2023-09-01 DIAGNOSIS — F41.1 GENERALIZED ANXIETY DISORDER: Chronic | ICD-10-CM

## 2023-09-01 DIAGNOSIS — F33.41 RECURRENT MAJOR DEPRESSIVE DISORDER, IN PARTIAL REMISSION (HCC): ICD-10-CM

## 2023-09-01 PROCEDURE — G0410 GRP PSYCH PARTIAL HOSP 45-50: HCPCS

## 2023-09-01 PROCEDURE — G0176 OPPS/PHP;ACTIVITY THERAPY: HCPCS

## 2023-09-01 PROCEDURE — 99213 OFFICE O/P EST LOW 20 MIN: CPT | Performed by: PHYSICIAN ASSISTANT

## 2023-09-01 PROCEDURE — S0201 PARTIAL HOSPITALIZATION SERV: HCPCS

## 2023-09-01 PROCEDURE — G0177 OPPS/PHP; TRAIN & EDUC SERV: HCPCS

## 2023-09-01 RX ORDER — SERTRALINE HYDROCHLORIDE 100 MG/1
100 TABLET, FILM COATED ORAL DAILY
COMMUNITY
Start: 2023-09-01 | End: 2023-09-06 | Stop reason: SDUPTHER

## 2023-09-01 NOTE — PSYCH
Progress Note - Behavioral Health   Aby Johnson 46 y.o. female MRN: [id number]     Progress at partial program: some improvement. Chart reviewed and discussed in treatment team.  Zayra Rodrigues seen by [place] 8/25 at which time meds unchanged. Zayra Rodrigues seen in ED 8/28 for GI bleed- no acute findings on work-up and has spoken to GI today- they are doing prior auth for new med and have flex sig scheduled for 9/20. Zayra Rodrigues feels she may be having side effect to one of the meds. C/o physically pushing herself to do all activities inc ADLS, has rash upper ext (observed when she was in ED), motivation low, anxiety high.   Denies SI.         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   • Vomiting and diarrhea 08/25/2017   • SIRS (systemic inflammatory response syndrome) (HCC) 08/25/2017   • Vitamin D deficiency 08/25/2017   • Hypokalemia 08/25/2017   • Throat tightness 08/25/2017   • Abdominal pain 08/25/2017   • Headache 08/25/2017   • Hypomagnesemia 08/26/2017   • Generalized anxiety disorder 08/26/2017   • Acquired hypothyroidism 01/13/2014   • Leukocytosis 09/10/2017   • CHF (congestive heart failure) ([address]) 11/29/2017   • Fibromyalgia 12/28/2017   • 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   • Meniere's disease 11/29/2017   • Chronic idiopathic urticaria 05/02/2017   • Disorder of synovium 06/23/2017   • Primary localized osteoarthrosis of ankle and foot 06/04/2018   • Thrush 02/12/2019   • Chronic back pain 02/12/2019   • Thrombocytosis 08/26/2019   • Proctitis 08/26/2019   • Immunosuppressed status ([address]) 08/27/2019   • Mass of left axilla 08/30/2019   • Constipation 09/10/2019   • Mild protein-calorie malnutrition ([address]) 09/12/2019   • Anorexia 10/17/2020   • Crohn's colitis ([address]) 10/17/2020   • Overweight (BMI 25.0-29.9) 01/20/2021   • Current chronic use of systemic steroids 01/28/2021   • Migraine    • MS (multiple sclerosis) ([address]) 01/28/2021   • Inflammatory bowel disease 03/07/2021   • Heart palpitations 03/26/2021   • Intractable nausea and vomiting 12/02/2021   • Anaphylactic reaction 12/04/2021   • Nephrolithiasis 12/06/2021   • Atrial tachycardia ([address]) 02/24/2022   • Immunosuppression due to chronic steroid use ([address]) 08/05/2022   • Seizure ([address])    • Bladder tumor 06/03/2023   • Systemic lupus erythematosus, unspecified SLE type, unspecified organ involvement status ([address]) 06/23/2023   • Encephalopathy chronic 07/19/2023   • Passive suicidal ideations 07/22/2023   • Medical clearance for psychiatric admission 07/25/2023   • Seasonal allergies 07/25/2023   • GERD without esophagitis 07/25/2023   • Major depressive disorder with psychotic features ([address]) 07/25/2023   • Benzodiazepine misuse 07/25/2023   • Recurrent major depressive disorder, in partial remission ([address]) 08/18/2023     Resolved Ambulatory Problems     Diagnosis Date Noted   • Lactic acidosis 08/25/2017   • Hyponatremia 03/11/2021   • Decreased oral intake 05/29/2023   • Hematuria      Past Medical History:   Diagnosis Date   • Anemia 2007   • Anxiety    • Asthma    • Bile duct stricture 2014   • Chronic kidney disease    • Colon polyp 1998   • Deep vein thrombosis ([address])    • Disease of thyroid gland    • Disorder of sphincter of Oddi    • GERD (gastroesophageal reflux disease) 1995   • GI (gastrointestinal bleed) 1994   • Heart murmur    • Irritable bowel syndrome 2016   • Lactose intolerance 1982   • Mast cell disease    • Myocardial infarction ([address])    • Pancreatitis    • Psychiatric disorder    • RA (rheumatoid arthritis) ([address])    • Seizures ([address])    • Ulcerative colitis ([address])    • Visual impairment      Family History   Problem Relation Age of Onset   • Ulcerative colitis Mother    • Arthritis Mother    • Colon polyps Mother    • Heart failure Father    • Neuropathy Father    • Macular degeneration Father    • Diabetes Father    • Early death Father    • Vision loss Father    • Asthma Daughter    • Hypothyroidism Daughter    • Heart disease Maternal Grandmother    • Arthritis Maternal Grandmother    • No Known Problems Maternal Grandfather    • Arthritis Paternal Grandmother    • Macular degeneration Paternal Grandmother    • Vision loss Paternal Grandmother    • Lymphoma Paternal Grandfather    • Cancer Paternal Grandfather    • Early death Paternal Grandfather    • Breast cancer Maternal Aunt    • Cancer Maternal Aunt    • Miscarriages / Stillbirths Maternal Aunt    • Heart failure Paternal Aunt    • Heart failure Paternal Aunt    • Irritable bowel syndrome Paternal Aunt    • Breast cancer Paternal Aunt    • Breast cancer additional onset Paternal Aunt    • Hypothyroidism Paternal Aunt    • Cancer Paternal Aunt    • No Known Problems Son    • No Known Problems Son    • Kidney disease Brother    • Depression Paternal Uncle    • Early death Paternal Uncle      Allergies   Allergen Reactions   • Aimovig [Erenumab-Aooe] Anaphylaxis   • Amitriptyline Anaphylaxis     According to the patient she had nphylaxis   • Aspergillus Allergy Skin Test Other (See Comments), Hives and Swelling   • Azathioprine Other (See Comments) and Anaphylaxis     pancreatitis  According to patient she needed Epipen   • Banana - Food Allergy Itching, Swelling and Anaphylaxis   • Buspar [Buspirone] Hives and Shortness Of Breath   • Citric Acid-Polysorbate 80 Abdominal Pain, Anaphylaxis, Anxiety, Bradycardia, Diarrhea, Fatigue, GI Intolerance, Headache, Hives, Hyperactivity, Itching, Lip Swelling, Nausea Only, Palpitations, Rash, Shortness Of Breath, Tachycardia, Throat Swelling, Tongue Swelling and Vomiting   • Corn Dextrin Anaphylaxis     Any corn based products   • Eggs Or Egg-Derived Products - Food Allergy Anaphylaxis   • Epinephrine Anaphylaxis   • Erenumab Anaphylaxis     patient sent portal message stating she had anaphylaxis  patient sent portal message stating she had anaphylaxis     • Gadolinium Abdominal Pain, Anaphylaxis, Anxiety, Confusion, Delirium, Dizziness, Eye Swelling, Fatigue, GI Intolerance, Headache, Hives, Irritability, Itching, Lip Swelling, Nausea Only, Palpitations, Photosensitivity, Rash, Seizures, Shortness Of Breath, Swelling, Syncope, Throat Swelling, Tongue Swelling, Tremor, Visual Disturbance and Vomiting   • Gelatin - Food Allergy Anaphylaxis   • Heparin Hives     Painful welts    • Lactated Ringers Shortness Of Breath     Pt questions this allergy, pt has received LR multiple times without reaction   • Lavender Oil Anaphylaxis     Other reaction(s): anaphalxis   • Malto Dextrin [Dextrin] Anaphylaxis   • Other Anaphylaxis     Gel caps, maltodextrose, dextrose,grapes, lavender    • Penicillium Notatum Shortness Of Breath and Swelling   • Red Dye - Food Allergy Other (See Comments) and Anaphylaxis     intolerance   • Sumatriptan Anaphylaxis     Bradycardia, sob, back pressure, head pain,throat tightness  According to the patient she had anphylaxis   • Ustekinumab Anaphylaxis   • Contrast [Iodinated Contrast Media] Other (See Comments)     Pt states needs to be premedicated prior to injection   • Corn Oil - Food Allergy - Food Allergy Hives   • Humira [Adalimumab] Other (See Comments)     "I develop drug induced lupus"   • Ibuprofen Hives and Throat Swelling   • Polyethylene Glycol Diarrhea and Vomiting   • Remicade [Infliximab] Other (See Comments)     "I develop drug induced lupus"   • Soy Allergy - Food Allergy Other (See Comments)   • Sulfa Antibiotics Hives and Other (See Comments)   • Sulfate Hives   • Sulfites - Food Allergy Hives   • Sweet Corn Extract Skin Test - Food Allergy Hives and Itching   • Chlorhexidine Itching   • Freederm Adhesive Remover [New Skin] Rash   • Histamine Hives, Rash and Other (See Comments)     Intolerance            Mental Status Evaluation:    Appearance:  age appropriate, casually dressed   Behavior:  pleasant, cooperative Speech:  normal rate and volume   Mood:  improved mood, anxious   Affect:  brighter   Thought Process:  goal directed   Associations: intact associations   Thought Content:  normal   Perceptual Disturbances: none   Risk Potential: Suicidal ideation - None  Homicidal ideation - None   Sensorium:  oriented to person, place and time/date   Memory:  recent and remote memory grossly intact   Consciousness:  alert and awake   Attention: attention span and concentration are age appropriate   Insight:  intact   Judgment: intact   Gait/Station: normal gait/station   Motor Activity: no abnormal movements       Laboratory results: I have personally reviewed all pertinent laboratory/tests results. Assessment   Diagnoses and all orders for this visit:    Major depressive disorder with psychotic features (HCC)  -     sertraline (ZOLOFT) 100 mg tablet;  Take 1 tablet (100 mg total) by mouth daily    Generalized anxiety disorder       Current Outpatient Medications   Medication Sig Dispense Refill   • sertraline (ZOLOFT) 100 mg tablet Take 1 tablet (100 mg total) by mouth daily     • ARIPiprazole (ABILIFY) 10 mg tablet Take 1 tablet (10 mg total) by mouth daily Do not start before August 16, 2023. 30 tablet 0   • B-D 3CC LUER-SHAMEKA SYR 25GX1" 25G X 1" 3 ML MISC      • butalbital-acetaminophen-caffeine (FIORICET,ESGIC) -40 mg per tablet Take 1 tablet by mouth every 8 (eight) hours as needed for migraine 20 tablet 1   • cetirizine (ZyrTEC) 10 mg tablet Take 20 mg by mouth daily     • Cholecalciferol 10 MCG /0.025ML LIQD Take 1,000 Units by mouth 2 (two) times a day 750 mL 1   • diclofenac (VOLTAREN) 75 mg EC tablet      • EpiPen 2-Malachi 0.3 MG/0.3ML SOAJ INJECT 1 PEN INTO THE MUSCLE AS NEEDED FOR ANAPHALAXIS     • famotidine (PEPCID) 40 MG tablet Take 0.5 tablets (20 mg total) by mouth daily 90 tablet 0   • hydrOXYzine HCL (ATARAX) 25 mg tablet      • Lactobacillus (PROBIOTIC ACIDOPHILUS PO) Take by mouth     • Lactobacillus-Inulin ([facility] [employer] [facility]) CAPS Take 200 mg by mouth daily     • levalbuterol (XOPENEX HFA) 45 mcg/act inhaler Inhale 1-2 puffs every 4 (four) hours as needed for shortness of breath 45 g 2   • levothyroxine 100 mcg tablet Take 100 mcg by mouth every other day     • liothyronine (CYTOMEL) 5 mcg tablet Take 1 tablet (5 mcg total) by mouth daily Do not start before June 14, 2023. 30 tablet 0   • Melatonin 3 MG SUBL      • melatonin 3 mg Take 1 tablet (3 mg total) by mouth daily at bedtime 30 tablet 0   • montelukast (SINGULAIR) 10 mg tablet Take 1 tablet (10 mg total) by mouth daily at bedtime  0   • NON FORMULARY immunoglobin sq 6g 30 ml once a week     • nystatin (MYCOSTATIN) 500,000 units/5 mL suspension Swish and spit 5 mL (500,000 Units total) 4 (four) times a day 473 mL 0   • omalizumab (XOLAIR) 150 mg Inject 2.4 mL (300 mg total) under the skin every 28 days 1 each 2   • pantoprazole (PROTONIX) 40 mg tablet TAKE ONE TABLET BY MOUTH TWICE A DAY 60 tablet 5   • predniSONE 20 mg tablet Take 1 tablet (20 mg total) by mouth daily Take as instructed by physician. 20 mg three times a day for 3 days, then 20 mg twice daily 60 tablet 0   • TechLite Lancets MISC      • traMADol (Ultram) 50 mg tablet Take 1 tablet (50 mg total) by mouth every 12 (twelve) hours as needed for moderate pain or severe pain 60 tablet 0   • traZODone (DESYREL) 50 mg tablet Take 1 tablet (50 mg total) by mouth daily at bedtime 30 tablet 0     No current facility-administered medications for this visit. Plan    Planned medication and treatment changes: Will decrease zoloft from 150 mg to 100 mg and re-evaluate on Tues 9/5. For now cont abilify 10 mg/d, trazodone 50 mg q bedtime, prn atarax. If no change by 9/5, will decrease abilify. All current active medications have been reviewed.   Continue treatment with group therapy and partial program      Risks / Benefits of Treatment:    Risks, benefits, and possible side effects of medications explained to patient and patient verbalizes understanding and agrees to medications. Counseling / Coordination of Care:    Patient's progress discussed with staff in treatment team meeting. Medications, treatment progress and treatment plan reviewed with patient.     Messi Mcginnis PA-C 09/01/23

## 2023-09-01 NOTE — PSYCH
Visit Time    Visit Start Time: 0262  Visit Stop Time: 6715  Total Visit Duration: 1 hour    Subjective:     Patient ID: Oscar Sinclair is a 46 y.o. y.o. female. [facility] Clinical Progress Notes      Specialized Services Documentation  Therapist must complete separate progress note for each specific clinical activity in which the individual participated during the day. Allied Therapy   Oscar Sinclair actively shared in music therapy group exploring DBT distress tolerance “crisis survival strategies”. Group explored crisis survival strategies “ACCEPTS, TIP, and STOP) reinforcing actions one could take to learn to tolerate stressful experiences, thoughts and urges. Anihimelissa Rogersarmani participated in [place] examples, square breathing, tay fill in, and an object medication to explore several strategies. She felt she could put effort into practicing ACCEPTS and TIPP. Some progress toward goal noted. Continue AT group to encourage learning, practice, and home practice of skills to manage distress.    Tx Plan Objective: 1.1, Therapist:  Shad SCHOFIELD

## 2023-09-01 NOTE — PSYCH
Visit Time    Visit Start Time: 0930  Visit Stop Time: 2033  Total Visit Duration: 60 minutes    Subjective:     Patient ID: Christine Stallings is a 46 y.o. female. [facility] Progress Notes      Specialized Services Documentation  Therapist must complete separate progress note for each specific clinical activity in which the individual participated during the day. Group Psychotherapy - Wellness Assessment   Christine Stallings attended group on the Wellness Assessment. The group engaged in the wellness assessment, which evaluates progress on several different areas of wellness/wellbeing: physical, emotional, cognitive, vocational, social and spiritual. Clients rated their progress and discussed areas that need work. By completing and discussing areas of progress, goals and challenges, members are connected and reminded that, in their mental health struggle, they are not alone. Topics of discussion revolved around setting goals, coping with change, methods to achieve goals. Christine Stallings continues to make progress towards goals through participation in group activity and personal disclosures. Lorelie Cranker shared that she has gained more self awareness this week during program. She requested a group on boundaries. Continue to run daily group psychotherapy to meet treatment needs and encourage Christine Stallings to practice skills outside of program.      Tx Plan Objective:1.1,1.2,1.4  Therapist: P[facility]      Education Therapy   [id number] Christine Stallings actively shared in morning assessment and goal review. Presented as Receptive related to readiness to learn. Christine Stallings did complete goal from last treatment day identifying a gain of advocacy, responsibility, hope, and support. did not present with any barriers to learning. [address] [place] [address] engaged throughout the treatment day. Was engaged in learning related to Illness, Medication, Aftercare, and Wellness Tools.  Staff utilized Verbal, Written, A/V, and Demonstration teaching methods. Nadine Maza shared area of learning and set a goal for outside of program to practice the skills from group.       Tx Plan Objective: 1.1,1.2,1.4, Therapist: Carloz Manzanares, [address]

## 2023-09-01 NOTE — PSYCH
Subjective:     Patient ID: Christine Stallings is a 46 y.o. female. [facility] Clinical Progress Notes      Specialized Services Documentation  Therapist must complete separate progress note for each specific clinical activity in which the individual participated during the day. Group Psychotherapy Life Skills ([id number]) - Christine Stallings actively engaged in group focused on gratitude. Group members discussed why it is important to think about what we are grateful for. Group members worked on a gratefulness worksheet and shared their responses in group. Participants also learned about the G.L.A.D. technique for practice gratefulness. Lorelie Cranker stated that she is grateful for her , kids, her brother and [facility]. Group watched a short video about the 365 grateful project with speaker Kaylene Marcelo. We discussed the benefits of thinking, reflecting and talking about what they are grateful for. Lorelie Cranker continues to make progress towards goals through verbal participation in group; to accomplish long term goals continue to utilize skills learned in programming. Continue with psychotherapy to educate and encourage use of wellness tools. Tx Plan Objective: 1.1,1.2 Therapist: RANDY Greenfield    Case Management Note    EMMA Salazar    Current suicide risk : Low     A case management session is not scheduled today with Christine Stallings ; additionally, they did not request a CM meeting. Next scheduled session is 9/5/23. Medications changes/added/denied? Yes - see Naya Arriaza note    Treatment session number: 9    Individual Case Management Visit provided today?  No    [facility] follow up physician's orders: See Naya Arriaza note

## 2023-09-01 NOTE — PROGRESS NOTES
I scheduled Leia Morrow with you on 9/20/2023 with you at [facility], she expressed understanding. She was very glad to have it done sooner she is willing to travel to get it done sooner.

## 2023-09-02 ENCOUNTER — APPOINTMENT (OUTPATIENT)
Dept: LAB | Facility: HOSPITAL | Age: 48
End: 2023-09-02
Attending: INTERNAL MEDICINE
Payer: COMMERCIAL

## 2023-09-02 DIAGNOSIS — D75.839 THROMBOCYTOSIS: ICD-10-CM

## 2023-09-02 DIAGNOSIS — K50.119 CROHN'S DISEASE OF COLON WITH COMPLICATION (HCC): ICD-10-CM

## 2023-09-02 LAB
ALBUMIN SERPL BCP-MCNC: 4.1 G/DL (ref 3.5–5)
ALP SERPL-CCNC: 56 U/L (ref 34–104)
ALT SERPL W P-5'-P-CCNC: 26 U/L (ref 7–52)
ANION GAP SERPL CALCULATED.3IONS-SCNC: 8 MMOL/L
AST SERPL W P-5'-P-CCNC: 20 U/L (ref 13–39)
BASOPHILS # BLD AUTO: 0.12 THOUSANDS/ÂΜL (ref 0–0.1)
BASOPHILS NFR BLD AUTO: 2 % (ref 0–1)
BILIRUB SERPL-MCNC: 0.42 MG/DL (ref 0.2–1)
BUN SERPL-MCNC: 11 MG/DL (ref 5–25)
CALCIUM SERPL-MCNC: 9.3 MG/DL (ref 8.4–10.2)
CHLORIDE SERPL-SCNC: 103 MMOL/L (ref 96–108)
CO2 SERPL-SCNC: 28 MMOL/L (ref 21–32)
CREAT SERPL-MCNC: 0.71 MG/DL (ref 0.6–1.3)
CRP SERPL QL: 4.5 MG/L
EOSINOPHIL # BLD AUTO: 0.2 THOUSAND/ÂΜL (ref 0–0.61)
EOSINOPHIL NFR BLD AUTO: 3 % (ref 0–6)
ERYTHROCYTE [DISTWIDTH] IN BLOOD BY AUTOMATED COUNT: 14.7 % (ref 11.6–15.1)
FERRITIN SERPL-MCNC: 15 NG/ML (ref 11–307)
GFR SERPL CREATININE-BSD FRML MDRD: 99 ML/MIN/1.73SQ M
GLUCOSE P FAST SERPL-MCNC: 83 MG/DL (ref 65–99)
HCT VFR BLD AUTO: 43.2 % (ref 34.8–46.1)
HGB BLD-MCNC: 13.5 G/DL (ref 11.5–15.4)
IMM GRANULOCYTES # BLD AUTO: 0.02 THOUSAND/UL (ref 0–0.2)
IMM GRANULOCYTES NFR BLD AUTO: 0 % (ref 0–2)
INR PPP: 0.91 (ref 0.84–1.19)
IRON SATN MFR SERPL: 11 % (ref 15–50)
IRON SERPL-MCNC: 37 UG/DL (ref 50–212)
LYMPHOCYTES # BLD AUTO: 2.71 THOUSANDS/ÂΜL (ref 0.6–4.47)
LYMPHOCYTES NFR BLD AUTO: 35 % (ref 14–44)
MCH RBC QN AUTO: 28.8 PG (ref 26.8–34.3)
MCHC RBC AUTO-ENTMCNC: 31.3 G/DL (ref 31.4–37.4)
MCV RBC AUTO: 92 FL (ref 82–98)
MONOCYTES # BLD AUTO: 0.76 THOUSAND/ÂΜL (ref 0.17–1.22)
MONOCYTES NFR BLD AUTO: 10 % (ref 4–12)
NEUTROPHILS # BLD AUTO: 3.9 THOUSANDS/ÂΜL (ref 1.85–7.62)
NEUTS SEG NFR BLD AUTO: 50 % (ref 43–75)
NRBC BLD AUTO-RTO: 0 /100 WBCS
PLATELET # BLD AUTO: 492 THOUSANDS/UL (ref 149–390)
PMV BLD AUTO: 9.7 FL (ref 8.9–12.7)
POTASSIUM SERPL-SCNC: 3.9 MMOL/L (ref 3.5–5.3)
PROT SERPL-MCNC: 7.1 G/DL (ref 6.4–8.4)
PROTHROMBIN TIME: 12.2 SECONDS (ref 11.6–14.5)
RBC # BLD AUTO: 4.69 MILLION/UL (ref 3.81–5.12)
SODIUM SERPL-SCNC: 139 MMOL/L (ref 135–147)
TIBC SERPL-MCNC: 350 UG/DL (ref 250–450)
UIBC SERPL-MCNC: 313 UG/DL (ref 155–355)
WBC # BLD AUTO: 7.71 THOUSAND/UL (ref 4.31–10.16)

## 2023-09-02 PROCEDURE — 86140 C-REACTIVE PROTEIN: CPT

## 2023-09-02 PROCEDURE — 82728 ASSAY OF FERRITIN: CPT

## 2023-09-02 PROCEDURE — 83540 ASSAY OF IRON: CPT

## 2023-09-02 PROCEDURE — 80053 COMPREHEN METABOLIC PANEL: CPT

## 2023-09-02 PROCEDURE — 83550 IRON BINDING TEST: CPT

## 2023-09-02 PROCEDURE — 36415 COLL VENOUS BLD VENIPUNCTURE: CPT

## 2023-09-02 PROCEDURE — 85610 PROTHROMBIN TIME: CPT

## 2023-09-02 PROCEDURE — 85025 COMPLETE CBC W/AUTO DIFF WBC: CPT

## 2023-09-05 ENCOUNTER — OFFICE VISIT (OUTPATIENT)
Dept: PSYCHOLOGY | Facility: CLINIC | Age: 48
End: 2023-09-05
Payer: COMMERCIAL

## 2023-09-05 DIAGNOSIS — F33.41 RECURRENT MAJOR DEPRESSIVE DISORDER, IN PARTIAL REMISSION (HCC): ICD-10-CM

## 2023-09-05 DIAGNOSIS — F41.1 GENERALIZED ANXIETY DISORDER: Primary | Chronic | ICD-10-CM

## 2023-09-05 PROCEDURE — S0201 PARTIAL HOSPITALIZATION SERV: HCPCS

## 2023-09-05 PROCEDURE — G0410 GRP PSYCH PARTIAL HOSP 45-50: HCPCS

## 2023-09-05 PROCEDURE — G0176 OPPS/PHP;ACTIVITY THERAPY: HCPCS

## 2023-09-05 PROCEDURE — G0177 OPPS/PHP; TRAIN & EDUC SERV: HCPCS

## 2023-09-05 NOTE — PSYCH
Subjective:     Patient ID: Gordon Escobedo is a 46 y.o. female. [facility] Progress Notes      Specialized Services Documentation  Therapist must complete separate progress note for each specific clinical activity in which the individual participated during the day. Group Psychotherapy  This group was facilitated virtually in a private office.  ([id number]) Gordon Escobedo attended group on Part II of Wellness Recovery Action Plan. Today, members focused on  WRAP's following specificsections and encouraged to fill in coping tools from their toolbox for planned responses:   · Daily Plan   · Identification of triggers and stressors  · Early warning signs  · When things are breaking down and or getting worse  · Crisis Plan  · Post crisis plan    Group members shared pieces of information from these sections and identified their importance. Writer encouraged the members of group to continue utilizing the packet to develop plans inside and outside of program. Good effort towards progress of goals which were displayed through participation and engagement in topic. Treatment Plan Objectives: 1.1, 1.2  Therapist: Xu CARDOZA RN            Education Therapy   [id number] Gordon Escobedo actively shared in morning assessment and goal review. Presented as Receptive related to readiness to learn. Gordon Escobedo did complete goal from last treatment day identifying gaining hope, education, advocacy, responsibility and support. did not present with any barriers to learning. [address] [place] [address] engaged throughout the treatment day. Was engaged in learning related to Illness, Medication, Aftercare, and Wellness Tools. Staff utilized Verbal, Written, A/V, and Demonstration teaching methods. Gordon Escobedo shared area of learning and set a goal for outside of program to search for international camps and walk her dog.       Tx Plan Objective: 1.1,1.2,1.4 Therapist:  Xu CARDOZA RN

## 2023-09-05 NOTE — PSYCH
Visit Time    Visit Start Time: 0436  Visit Stop Time: 1330  Total Visit Duration: 60 minutes    Subjective:     Patient ID: Tommy Bullock is a 46 y.o. female. [facility] Progress Notes      Specialized Services Documentation  Therapist must complete separate progress note for each specific clinical activity in which the individual participated during the day. Group Psychotherapy - Kayden Jaquez attended group on Boundaries. Today group members focused on Boundaries and identified ways they struggle with setting healthy boundaries. The goal of today's group was for members to evaluate their healthy and unhealthy boundaries and identify techniques they could use to practice setting boundaries. This writer facilitated group discussion on these questions relating to boundaries:    What  types of boundaries did you set or would like to set? What challenges can you encounter when setting boundaries? What are the positive and negative outcomes of setting boundaries? How do you react to a boundary violator? Members participated in group through involvement in group discussion and notetaking on topics such as:   7 types of boundaries     Importance of boundaries  [place] violations    Members evaluated their boundaries and acknowledged their struggle to set appropriate boundaries. This writer encouraged members to participate and integrate coping and reviewed techniques when setting boundaries. Tommy Bullock made good efforts towards progress goals which were displayed through participation, notetaking, and engagement in topic. Celia Joseph shared that she struggles to set boundaries and identified one that she would like to practice setting.  Continue to run daily group psychotherapy to meet treatment needs and encourage Tommy Bullock to practice skills outside of program.      Tx Plan Objective: 1.1,1.2,1.4 Therapist: [address]

## 2023-09-05 NOTE — TELEPHONE ENCOUNTER
Pt spoke with access center and is scheduled 9/20 at [facility] AND [facility]

## 2023-09-05 NOTE — PSYCH
Subjective:     Patient ID: Julien Reyes is a 46 y.o. female. [facility] Progress Notes      Specialized Services Documentation  Therapist must complete separate progress note for each specific clinical activity in which the individual participated during the day. Allied Therapy [id number] Julien Reyes actively participated in music therapy group regarding self-forgiveness. The group discussed past events/points of concern in their lives they felt the need to address. The group listened to and discussed the song "Letter to Me". The group then discussed and worked on letter to self worksheet. The group then listened to and discussed the song "Starting Over", and discussed a vision of their future self. Kajal Chapa identified a greater focus on herself over worrying for others as something they would like to see in their future self. Some effort noted towards treatment goal. Continue AT to encourage the development and proactive use of self-forgiveness.  Tx Plan Objective: 1.1,1.2,1.4, Therapist:  Tiffany Jacobs, ATUL, MT-BC

## 2023-09-05 NOTE — PSYCH
Subjective:     Patient ID: Sole Blue is a 46 y.o. female. [facility] Clinical Progress Notes      Specialized Services Documentation  Therapist must complete separate progress note for each specific clinical activity in which the individual participated during the day. Case Management Note    RANDY Bazan    Current suicide risk : Low     [id number]- Joel Crespo reported on her positive weekend. She shared that she booked another vacation to [place] for the end of the month. She shared that she applied to 8 volunteering camps. Joel Crespo shared that she is physically still feeling down, low motivation, and has blood in her stool. Joel Crespo shared that her PCP informed her that she has low Iron. Joel Crespo has concerns that one of her medications may be causing her to feel this way. Celine's med check was rescheduled until tomorrow. Joel Crespo was informed. Medications changes/added/denied? No    Treatment session number: 10    Individual Case Management Visit provided today?  Yes     [facility] follow up physician's orders: none at this time

## 2023-09-06 ENCOUNTER — TELEPHONE (OUTPATIENT)
Dept: GASTROENTEROLOGY | Facility: CLINIC | Age: 48
End: 2023-09-06

## 2023-09-06 ENCOUNTER — OFFICE VISIT (OUTPATIENT)
Dept: PSYCHOLOGY | Facility: CLINIC | Age: 48
End: 2023-09-06
Payer: COMMERCIAL

## 2023-09-06 ENCOUNTER — OFFICE VISIT (OUTPATIENT)
Dept: PSYCHIATRY | Facility: CLINIC | Age: 48
End: 2023-09-06
Payer: COMMERCIAL

## 2023-09-06 ENCOUNTER — TELEPHONE (OUTPATIENT)
Dept: PSYCHIATRY | Facility: CLINIC | Age: 48
End: 2023-09-06

## 2023-09-06 DIAGNOSIS — F32.3 MAJOR DEPRESSIVE DISORDER WITH PSYCHOTIC FEATURES (HCC): ICD-10-CM

## 2023-09-06 DIAGNOSIS — F33.41 RECURRENT MAJOR DEPRESSIVE DISORDER, IN PARTIAL REMISSION (HCC): ICD-10-CM

## 2023-09-06 DIAGNOSIS — F41.1 GENERALIZED ANXIETY DISORDER: Primary | Chronic | ICD-10-CM

## 2023-09-06 PROCEDURE — G0177 OPPS/PHP; TRAIN & EDUC SERV: HCPCS

## 2023-09-06 PROCEDURE — S0201 PARTIAL HOSPITALIZATION SERV: HCPCS

## 2023-09-06 PROCEDURE — 99213 OFFICE O/P EST LOW 20 MIN: CPT | Performed by: PHYSICIAN ASSISTANT

## 2023-09-06 PROCEDURE — G0410 GRP PSYCH PARTIAL HOSP 45-50: HCPCS

## 2023-09-06 PROCEDURE — G0176 OPPS/PHP;ACTIVITY THERAPY: HCPCS

## 2023-09-06 RX ORDER — ARIPIPRAZOLE 10 MG/1
10 TABLET ORAL DAILY
Qty: 30 TABLET | Refills: 0 | Status: SHIPPED | OUTPATIENT
Start: 2023-09-06

## 2023-09-06 RX ORDER — SERTRALINE HYDROCHLORIDE 100 MG/1
100 TABLET, FILM COATED ORAL DAILY
Qty: 30 TABLET | Refills: 0 | Status: SHIPPED | OUTPATIENT
Start: 2023-09-06

## 2023-09-06 RX ORDER — TRAZODONE HYDROCHLORIDE 50 MG/1
50 TABLET ORAL
Qty: 30 TABLET | Refills: 0 | Status: SHIPPED | OUTPATIENT
Start: 2023-09-06

## 2023-09-06 NOTE — PSYCH
Progress Note - Behavioral Health   Donny Arellano 46 y.o. female MRN: [id number]     Progress at partial program: some improvement. Chart reviewed and discussed in treatment team.  Seen by Andreia 9/1 at which time zoloft decreased to 100 mg. Beatrice Angelucci had active and productive weekend per treatment team.  Dx with iron def and scheduled to start infusions on 9/14. Beatrice Angelucci says she feels better on lower zoloft dose- "more interested in things". Says she wakes up feeling pretty good but quickly develops fatigue and low motivation. Feels better in evening after afternoon rest.  Of note, Beatrice Angelucci is also tapering off prednisone at this time.           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   • Vomiting and diarrhea 08/25/2017   • SIRS (systemic inflammatory response syndrome) (HCC) 08/25/2017   • Vitamin D deficiency 08/25/2017   • Hypokalemia 08/25/2017   • Throat tightness 08/25/2017   • Abdominal pain 08/25/2017   • Headache 08/25/2017   • Hypomagnesemia 08/26/2017   • Generalized anxiety disorder 08/26/2017   • Acquired hypothyroidism 01/13/2014   • Leukocytosis 09/10/2017   • CHF (congestive heart failure) ([address]) 11/29/2017   • Fibromyalgia 12/28/2017   • 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   • Meniere's disease 11/29/2017   • Chronic idiopathic urticaria 05/02/2017   • Disorder of synovium 06/23/2017   • Primary localized osteoarthrosis of ankle and foot 06/04/2018   • Thrush 02/12/2019   • Chronic back pain 02/12/2019   • Thrombocytosis 08/26/2019   • Proctitis 08/26/2019   • Immunosuppressed status ([address]) 08/27/2019   • Mass of left axilla 08/30/2019   • Constipation 09/10/2019   • Mild protein-calorie malnutrition ([address]) 09/12/2019   • Anorexia 10/17/2020   • Crohn's colitis ([address]) 10/17/2020   • Overweight (BMI 25.0-29.9) 01/20/2021   • Current chronic use of systemic steroids 01/28/2021   • Migraine    • MS (multiple sclerosis) ([address]) 01/28/2021   • Inflammatory bowel disease 03/07/2021   • Heart palpitations 03/26/2021   • Intractable nausea and vomiting 12/02/2021   • Anaphylactic reaction 12/04/2021   • Nephrolithiasis 12/06/2021   • Atrial tachycardia ([address]) 02/24/2022   • Immunosuppression due to chronic steroid use ([address]) 08/05/2022   • Seizure ([address])    • Bladder tumor 06/03/2023   • Systemic lupus erythematosus, unspecified SLE type, unspecified organ involvement status ([address]) 06/23/2023   • Encephalopathy chronic 07/19/2023   • Passive suicidal ideations 07/22/2023   • Medical clearance for psychiatric admission 07/25/2023   • Seasonal allergies 07/25/2023   • GERD without esophagitis 07/25/2023   • Major depressive disorder with psychotic features ([address]) 07/25/2023   • Benzodiazepine misuse 07/25/2023   • Recurrent major depressive disorder, in partial remission ([address]) 08/18/2023     Resolved Ambulatory Problems     Diagnosis Date Noted   • Lactic acidosis 08/25/2017   • Hyponatremia 03/11/2021   • Decreased oral intake 05/29/2023   • Hematuria      Past Medical History:   Diagnosis Date   • Anemia 2007   • Anxiety    • Asthma    • Bile duct stricture 2014   • Chronic kidney disease    • Colon polyp 1998   • Deep vein thrombosis ([address])    • Disease of thyroid gland    • Disorder of sphincter of Oddi    • GERD (gastroesophageal reflux disease) 1995   • GI (gastrointestinal bleed) 1994   • Heart murmur    • Irritable bowel syndrome 2016   • Lactose intolerance 1982   • Mast cell disease    • Myocardial infarction ([address])    • Pancreatitis    • Psychiatric disorder    • RA (rheumatoid arthritis) ([address])    • Seizures ([address])    • Ulcerative colitis ([address])    • Visual impairment      Family History   Problem Relation Age of Onset   • Ulcerative colitis Mother    • Arthritis Mother    • Colon polyps Mother    • Heart failure Father    • Neuropathy Father    • Macular degeneration Father    • Diabetes Father    • Early death Father    • Vision loss Father    • Asthma Daughter    • Hypothyroidism Daughter    • Heart disease Maternal Grandmother    • Arthritis Maternal Grandmother    • No Known Problems Maternal Grandfather    • Arthritis Paternal Grandmother    • Macular degeneration Paternal Grandmother    • Vision loss Paternal Grandmother    • Lymphoma Paternal Grandfather    • Cancer Paternal Grandfather    • Early death Paternal Grandfather    • Breast cancer Maternal Aunt    • Cancer Maternal Aunt    • Miscarriages / Stillbirths Maternal Aunt    • Heart failure Paternal Aunt    • Heart failure Paternal Aunt    • Irritable bowel syndrome Paternal Aunt    • Breast cancer Paternal Aunt    • Breast cancer additional onset Paternal Aunt    • Hypothyroidism Paternal Aunt    • Cancer Paternal Aunt    • No Known Problems Son    • No Known Problems Son    • Kidney disease Brother    • Depression Paternal Uncle    • Early death Paternal Uncle      Allergies   Allergen Reactions   • Aimovig [Erenumab-Aooe] Anaphylaxis   • Amitriptyline Anaphylaxis     According to the patient she had nphylaxis   • Aspergillus Allergy Skin Test Other (See Comments), Hives and Swelling   • Azathioprine Other (See Comments) and Anaphylaxis     pancreatitis  According to patient she needed Epipen   • Banana - Food Allergy Itching, Swelling and Anaphylaxis   • Buspar [Buspirone] Hives and Shortness Of Breath   • Citric Acid-Polysorbate 80 Abdominal Pain, Anaphylaxis, Anxiety, Bradycardia, Diarrhea, Fatigue, GI Intolerance, Headache, Hives, Hyperactivity, Itching, Lip Swelling, Nausea Only, Palpitations, Rash, Shortness Of Breath, Tachycardia, Throat Swelling, Tongue Swelling and Vomiting   • Corn Dextrin Anaphylaxis     Any corn based products   • Eggs Or Egg-Derived Products - Food Allergy Anaphylaxis   • Epinephrine Anaphylaxis   • Erenumab Anaphylaxis     patient sent portal message stating she had anaphylaxis  patient sent portal message stating she had anaphylaxis     • Gadolinium Abdominal Pain, Anaphylaxis, Anxiety, Confusion, Delirium, Dizziness, Eye Swelling, Fatigue, GI Intolerance, Headache, Hives, Irritability, Itching, Lip Swelling, Nausea Only, Palpitations, Photosensitivity, Rash, Seizures, Shortness Of Breath, Swelling, Syncope, Throat Swelling, Tongue Swelling, Tremor, Visual Disturbance and Vomiting   • Gelatin - Food Allergy Anaphylaxis   • Heparin Hives     Painful welts    • Lactated Ringers Shortness Of Breath     Pt questions this allergy, pt has received LR multiple times without reaction   • Lavender Oil Anaphylaxis     Other reaction(s): anaphalxis   • Malto Dextrin [Dextrin] Anaphylaxis   • Other Anaphylaxis     Gel caps, maltodextrose, dextrose,grapes, lavender    • Penicillium Notatum Shortness Of Breath and Swelling   • Red Dye - Food Allergy Other (See Comments) and Anaphylaxis     intolerance   • Sumatriptan Anaphylaxis     Bradycardia, sob, back pressure, head pain,throat tightness  According to the patient she had anphylaxis   • Ustekinumab Anaphylaxis   • Contrast [Iodinated Contrast Media] Other (See Comments)     Pt states needs to be premedicated prior to injection   • Corn Oil - Food Allergy - Food Allergy Hives   • Humira [Adalimumab] Other (See Comments)     "I develop drug induced lupus"   • Ibuprofen Hives and Throat Swelling   • Polyethylene Glycol Diarrhea and Vomiting   • Remicade [Infliximab] Other (See Comments)     "I develop drug induced lupus"   • Soy Allergy - Food Allergy Other (See Comments)   • Sulfa Antibiotics Hives and Other (See Comments)   • Sulfate Hives   • Sulfites - Food Allergy Hives   • Sweet Corn Extract Skin Test - Food Allergy Hives and Itching   • Chlorhexidine Itching   • Freederm Adhesive Remover [New Skin] Rash   • Histamine Hives, Rash and Other (See Comments)     Intolerance            Mental Status Evaluation:    Appearance:  age appropriate, casually dressed   Behavior:  pleasant, cooperative   Speech:  normal rate and volume   Mood:  low but improved   Affect:  mood-congruent   Thought Process:  goal directed   Associations: intact associations   Thought Content:  no overt delusions   Perceptual Disturbances: none   Risk Potential: Suicidal ideation - None  Homicidal ideation - None   Sensorium:  oriented to person, place and time/date   Memory:  recent and remote memory grossly intact   Consciousness:  alert and awake   Attention: attention span and concentration are age appropriate   Insight:  intact   Judgment: intact   Gait/Station: normal gait/station   Motor Activity: no abnormal movements       Laboratory results: I have personally reviewed all pertinent laboratory/tests results. Assessment   Diagnoses and all orders for this visit:    Major depressive disorder with psychotic features (HCC)  -     ARIPiprazole (ABILIFY) 10 mg tablet; Take 1 tablet (10 mg total) by mouth daily  -     sertraline (ZOLOFT) 100 mg tablet; Take 1 tablet (100 mg total) by mouth daily  -     traZODone (DESYREL) 50 mg tablet;  Take 1 tablet (50 mg total) by mouth daily at bedtime       Current Outpatient Medications   Medication Sig Dispense Refill   • ARIPiprazole (ABILIFY) 10 mg tablet Take 1 tablet (10 mg total) by mouth daily 30 tablet 0   • sertraline (ZOLOFT) 100 mg tablet Take 1 tablet (100 mg total) by mouth daily 30 tablet 0   • traZODone (DESYREL) 50 mg tablet Take 1 tablet (50 mg total) by mouth daily at bedtime 30 tablet 0   • B-D 3CC LUER-SHAMEKA SYR 25GX1" 25G X 1" 3 ML MISC      • butalbital-acetaminophen-caffeine (FIORICET,ESGIC) -40 mg per tablet Take 1 tablet by mouth every 8 (eight) hours as needed for migraine 20 tablet 1   • cetirizine (ZyrTEC) 10 mg tablet Take 20 mg by mouth daily     • Cholecalciferol 10 MCG /0.025ML LIQD Take 1,000 Units by mouth 2 (two) times a day 750 mL 1   • diclofenac (VOLTAREN) 75 mg EC tablet      • EpiPen 2-Malachi 0.3 MG/0.3ML SOAJ INJECT 1 PEN INTO THE MUSCLE AS NEEDED FOR ANAPHALAXIS     • famotidine (PEPCID) 40 MG tablet Take 0.5 tablets (20 mg total) by mouth daily 90 tablet 0   • hydrOXYzine HCL (ATARAX) 25 mg tablet      • Lactobacillus (PROBIOTIC ACIDOPHILUS PO) Take by mouth     • Lactobacillus-Inulin ([facility]) CAPS Take 200 mg by mouth daily     • levalbuterol (XOPENEX HFA) 45 mcg/act inhaler Inhale 1-2 puffs every 4 (four) hours as needed for shortness of breath 45 g 2   • levothyroxine 100 mcg tablet Take 100 mcg by mouth every other day     • liothyronine (CYTOMEL) 5 mcg tablet Take 1 tablet (5 mcg total) by mouth daily Do not start before June 14, 2023. 30 tablet 0   • Melatonin 3 MG SUBL      • melatonin 3 mg Take 1 tablet (3 mg total) by mouth daily at bedtime 30 tablet 0   • montelukast (SINGULAIR) 10 mg tablet Take 1 tablet (10 mg total) by mouth daily at bedtime  0   • NON FORMULARY immunoglobin sq 6g 30 ml once a week     • nystatin (MYCOSTATIN) 500,000 units/5 mL suspension Swish and spit 5 mL (500,000 Units total) 4 (four) times a day 473 mL 0   • omalizumab (XOLAIR) 150 mg Inject 2.4 mL (300 mg total) under the skin every 28 days 1 each 2   • pantoprazole (PROTONIX) 40 mg tablet TAKE ONE TABLET BY MOUTH TWICE A DAY 60 tablet 5   • predniSONE 20 mg tablet Take 1 tablet (20 mg total) by mouth daily Take as instructed by physician. 20 mg three times a day for 3 days, then 20 mg twice daily 60 tablet 0   • TechLite Lancets MISC      • traMADol (Ultram) 50 mg tablet Take 1 tablet (50 mg total) by mouth every 12 (twelve) hours as needed for moderate pain or severe pain 60 tablet 0     No current facility-administered medications for this visit. Plan    Planned medication and treatment changes:   Cont present meds unchanged. Ok to change zoloft to night time dosing or divided dosing if she better tolerates- cont 100 mg/d. Cont abilify 10 mg/d and trazodone 50 mg q bedtime.      All current active medications have been reviewed. Continue treatment with group therapy and partial program      Risks / Benefits of Treatment:    Risks, benefits, and possible side effects of medications explained to patient and patient verbalizes understanding and agrees to medications. Counseling / Coordination of Care:    Patient's progress discussed with staff in treatment team meeting. Medications, treatment progress and treatment plan reviewed with patient.     Messi Mcginnis PA-C 09/06/23

## 2023-09-06 NOTE — PSYCH
Subjective:     Patient ID: Jamarcus Burden is a 46 y.o. female. [facility] Clinical Progress Notes      Specialized Services Documentation  Therapist must complete separate progress note for each specific clinical activity in which the individual participated during the day. Case Management Note    Mariia Sinha [place]    Current suicide risk : Low     [id number]- Philippe Rivera will be off Thursday,Friday and Monday. Discussed stepping down to IOP. Philippe Rivera will be here Tuesday, Wednesday, and Friday next week and will discharge on the 15th. Philippe Rivera reports that she has been filling out more applications to camps and has heard back from one camp to set up an interview. Once discharged from the program she plans on working on educational credits for the camps. She needs to call an reschedule her therapy appointment due to not being in the area during that time. Appointment with Zara Staton is set up for 10/2/23 at 2pm. Informed of the appointment. Philippe Rivera shared about medication change and plans to get iron infusions next week. Medications changes/added/denied? Yes - See Una Lindo note    Treatment session number: 11    Individual Case Management Visit provided today?  Yes     [facility] follow up physician's orders: Start IOP order on 9/12/23

## 2023-09-06 NOTE — TELEPHONE ENCOUNTER
Patient has been scheduled per [facility] to be scheduled with provider.      Dr. Geena Sanford 10/2 @

## 2023-09-06 NOTE — PSYCH
Subjective:     Patient ID: Tommy Bullock is a 46 y.o. female. [facility] Progress Notes      Specialized Services Documentation  Therapist must complete separate progress note for each specific clinical activity in which the individual participated during the day. Group Psychotherapy - Anxiety  This group was facilitated virtually in a private office. [id number] Tommy Bullock was present in psychoeducational group about anxiety. The group followed a slide show presentation on Anxiety and the Brain. Group was educated on:  • Signs and symptoms of anxiety   • Causes and treatments of anxiety  • How the brain processes anxiety  • Group members then discussed anxiety causing situations and positive ways to cope with these stressors. • Coping skills discussed were breathing paired with sitting/noticing with our thoughts and emotions exercise, challenging irrational thoughts, progressive muscle relaxation, Dropping anchor, deep breathing, and imagery. Good progress towards goal noted through participation in group. Continue psychoeducational groups on depression to teach the value of learning about diagnosis and treatments available.      Tx Plan Objective:  1.1, 1.2, 1.3, 1.4 Therapist:  Srikanth CARDOZA RN

## 2023-09-06 NOTE — PSYCH
Glasses Rx given. Visit Time    Visit Start Time: 0930  Visit Stop Time: 9448  Total Visit Duration: 60 minutes    Subjective:     Patient ID: Joann Peterson is a 46 y.o. female. [facility] Progress Notes      Group Psychotherapy- Guilt and Shame  Joann Peterson attended group on guilt and shame. Group members were encouraged to share their perspective on guilt, shame, and regret. This leader facilitated a discussion on the differences between guilt and shame. Members then viewed a video depicting shame and guilt from an adaptive lens. Members were encouraged to reflect on this video. This writer reviewed a document detailing:  • Healthy guilt  • Unhealthy guilt  • Shame  Group members reviewed the importance of self-compassion while expanding on guilt and shame. Joann Peterson shared that they will be vulnerable to show self-compassion. Joann Peterson made good efforts towards progress goals which were displayed through participation and engagement in topic. Lorrie Yoo reflected on how guilt and shame are very prevalent in her life and can sometimes be influenced by interactions with unhealthy supports. Continue to run daily group psychotherapy to meet treatment needs and encourage Joann Peterson to practice skills outside of program.      Tx Plan Objective: 1.1,1.2,1.4 Therapist: Sherryle Simper, [facility]    Education Therapy   [id number] Joann Peterson actively shared in morning assessment and goal review. Presented as Receptive related to readiness to learn. Joann Peterson did complete goal from last treatment day identifying a gain of hope, education, responsibility, and support. did not present with any barriers to learning. [address] [place] [address] engaged throughout the treatment day. Was engaged in learning related to Illness, Medication, Aftercare, and Wellness Tools. Staff utilized Verbal, Written, A/V, and Demonstration teaching methods.   Joann Peterson shared area of learning and set a goal for outside of program to get a table and pack for her trip.       Tx Plan Objective: 1.1,1.2,1.4, Therapist: Calos Minor, [address]

## 2023-09-06 NOTE — PSYCH
Subjective:     Patient ID: Alexis Loaiza is a 46 y.o. female. [facility] Progress Notes      Specialized Services Documentation  Therapist must complete separate progress note for each specific clinical activity in which the individual participated during the day. Allied Therapy [id number] Alexis Loaiza actively participated in music therapy group regarding grief and loss. The group participated in a discussion about their own experiences with grief and loss. The group then participated in a tay discussion on the song "My Old Man". The group read and discussed handouts regarding the stages of grief and general facts about grief. The group participated in a tay discussion on "Because of You". Tereza Molina identified sitting in her garden and listening to the chimes as a coping skill they can use for managing grief outside of program. Some effort noted towards treatment goal. Continue AT to encourage the development and proactive use of coping techniques.   Tx Plan Objective: 1.1,1.2,1.4, Therapist:  Edita Cali, ATUL, MT-BC

## 2023-09-07 ENCOUNTER — DOCUMENTATION (OUTPATIENT)
Dept: PSYCHOLOGY | Facility: CLINIC | Age: 48
End: 2023-09-07

## 2023-09-07 ENCOUNTER — APPOINTMENT (OUTPATIENT)
Dept: PSYCHOLOGY | Facility: CLINIC | Age: 48
End: 2023-09-07
Payer: COMMERCIAL

## 2023-09-07 NOTE — PROGRESS NOTES
Subjective:     Patient ID: Agustín Valverde is a 46 y.o. female. [facility] Progress Notes      Specialized Services Documentation  Therapist must complete separate progress note for each specific clinical activity in which the individual participated during the day. Case Management  Agustín Valverde excused due to IOP status.     Jane Donovan BSN RN

## 2023-09-08 ENCOUNTER — APPOINTMENT (OUTPATIENT)
Dept: PSYCHOLOGY | Facility: CLINIC | Age: 48
End: 2023-09-08
Payer: COMMERCIAL

## 2023-09-08 ENCOUNTER — DOCUMENTATION (OUTPATIENT)
Dept: PSYCHOLOGY | Facility: CLINIC | Age: 48
End: 2023-09-08

## 2023-09-08 NOTE — PROGRESS NOTES
Subjective:     Patient ID: Erik Schuster is a 46 y.o. female. [facility] Progress Notes      Specialized Services Documentation  Therapist must complete separate progress note for each specific clinical activity in which the individual participated during the day. Case Management Note    EMMA Shen    Current suicide risk : Unable to assess due to absence. Erik Schuster excused due to IOP status. Medications changes/added/denied? No    Treatment session number: N/A    Individual Case Management Visit provided today? No    [facility] follow up physician's orders: None at this time.

## 2023-09-11 ENCOUNTER — APPOINTMENT (OUTPATIENT)
Dept: PSYCHOLOGY | Facility: CLINIC | Age: 48
End: 2023-09-11
Payer: COMMERCIAL

## 2023-09-11 ENCOUNTER — DOCUMENTATION (OUTPATIENT)
Dept: PSYCHOLOGY | Facility: CLINIC | Age: 48
End: 2023-09-11

## 2023-09-11 NOTE — PROGRESS NOTES
Subjective:     Patient ID: Alexis Loaiza is a 46 y.o. female. [facility] Progress Notes      Specialized Services Documentation  Therapist must complete separate progress note for each specific clinical activity in which the individual participated during the day. Case Management Note    EMMA Shepherd    Current suicide risk : Unable to assess due to absence. Alexis Loaiza excused due to IOP status. Unable to update tx plan due to absence. Medications changes/added/denied? No    Treatment session number: N/A    Individual Case Management Visit provided today? No    [facility] follow up physician's orders: None at this time.

## 2023-09-12 ENCOUNTER — DOCUMENTATION (OUTPATIENT)
Dept: PSYCHOLOGY | Facility: CLINIC | Age: 48
End: 2023-09-12

## 2023-09-12 ENCOUNTER — APPOINTMENT (OUTPATIENT)
Dept: PSYCHOLOGY | Facility: CLINIC | Age: 48
End: 2023-09-12
Payer: COMMERCIAL

## 2023-09-12 NOTE — PROGRESS NOTES
Subjective:     Patient ID: Ashtyn Carrasco is a 46 y.o. female. [facility] Progress Notes      Specialized Services Documentation  Therapist must complete separate progress note for each specific clinical activity in which the individual participated during the day. Case Management Note    EMMA To    Current suicide risk : Unable to assess due to absence. Ashtyn Carrasco was excused from  program today 09/12/23 due to her " Crohns being out of control" and trying to get into the doctor today for a steroid infusion to help. CM contacted Ashtyn Carrasco at [phone]. Unable to update tx plan due to Madiha Ban being absent from program.     Medications changes/added/denied? No    Treatment session number: N/A    Individual Case Management Visit provided today? No    [facility] follow up physician's orders: None at this time.

## 2023-09-13 ENCOUNTER — DOCUMENTATION (OUTPATIENT)
Dept: PSYCHOLOGY | Facility: CLINIC | Age: 48
End: 2023-09-13

## 2023-09-13 ENCOUNTER — HOSPITAL ENCOUNTER (OUTPATIENT)
Dept: INFUSION CENTER | Facility: HOSPITAL | Age: 48
Discharge: HOME | End: 2023-09-13
Attending: INTERNAL MEDICINE
Payer: COMMERCIAL

## 2023-09-13 ENCOUNTER — APPOINTMENT (OUTPATIENT)
Dept: PSYCHOLOGY | Facility: CLINIC | Age: 48
End: 2023-09-13
Payer: COMMERCIAL

## 2023-09-13 VITALS
HEART RATE: 72 BPM | SYSTOLIC BLOOD PRESSURE: 113 MMHG | OXYGEN SATURATION: 97 % | DIASTOLIC BLOOD PRESSURE: 57 MMHG | TEMPERATURE: 97.3 F | RESPIRATION RATE: 18 BRPM

## 2023-09-13 DIAGNOSIS — K50.119 CROHN'S DISEASE OF COLON WITH COMPLICATION (HCC): Primary | ICD-10-CM

## 2023-09-13 PROCEDURE — 96375 TX/PRO/DX INJ NEW DRUG ADDON: CPT

## 2023-09-13 PROCEDURE — 96367 TX/PROPH/DG ADDL SEQ IV INF: CPT

## 2023-09-13 PROCEDURE — 96365 THER/PROPH/DIAG IV INF INIT: CPT

## 2023-09-13 RX ORDER — METHYLPREDNISOLONE SODIUM SUCCINATE 40 MG/ML
40 INJECTION, POWDER, LYOPHILIZED, FOR SOLUTION INTRAMUSCULAR; INTRAVENOUS ONCE
Status: COMPLETED | OUTPATIENT
Start: 2023-09-13 | End: 2023-09-13

## 2023-09-13 RX ORDER — METHYLPREDNISOLONE SODIUM SUCCINATE 40 MG/ML
40 INJECTION, POWDER, LYOPHILIZED, FOR SOLUTION INTRAMUSCULAR; INTRAVENOUS ONCE
OUTPATIENT
Start: 2023-09-19

## 2023-09-13 RX ORDER — ACETAMINOPHEN 325 MG/1
650 TABLET ORAL ONCE
OUTPATIENT
Start: 2023-09-19

## 2023-09-13 RX ORDER — DEXTROSE MONOHYDRATE 50 MG/ML
20 INJECTION, SOLUTION INTRAVENOUS ONCE
Status: COMPLETED | OUTPATIENT
Start: 2023-09-13 | End: 2023-09-13

## 2023-09-13 RX ORDER — ACETAMINOPHEN 325 MG/1
650 TABLET ORAL ONCE
Status: COMPLETED | OUTPATIENT
Start: 2023-09-13 | End: 2023-09-13

## 2023-09-13 RX ORDER — DEXTROSE MONOHYDRATE 50 MG/ML
20 INJECTION, SOLUTION INTRAVENOUS ONCE
OUTPATIENT
Start: 2023-09-19

## 2023-09-13 RX ADMIN — DIPHENHYDRAMINE HYDROCHLORIDE 50 MG: 50 INJECTION, SOLUTION INTRAMUSCULAR; INTRAVENOUS at 14:45

## 2023-09-13 RX ADMIN — DEXTROSE 20 ML/HR: 5 SOLUTION INTRAVENOUS at 14:25

## 2023-09-13 RX ADMIN — ACETAMINOPHEN 650 MG: 325 TABLET, FILM COATED ORAL at 14:27

## 2023-09-13 RX ADMIN — METHYLPREDNISOLONE SODIUM SUCCINATE 40 MG: 40 INJECTION, POWDER, FOR SOLUTION INTRAMUSCULAR; INTRAVENOUS at 14:29

## 2023-09-13 RX ADMIN — DEXTROSE 600 MG: 5 SOLUTION INTRAVENOUS at 15:41

## 2023-09-13 NOTE — PROGRESS NOTES
Subjective:     Patient ID: Gordon Escobedo is a 46 y.o. female. [facility] Progress Notes      Specialized Services Documentation  Therapist must complete separate progress note for each specific clinical activity in which the individual participated during the day. Case Management Note    EMMA Muñoz    Current suicide risk : Unable to assess due to absence. Gordon Escobedo was excused from  program today 09/13/23 due to her  Not having relief from her Crohns symptoms. She will be seeing the doctor today for a steroid infusion to help. ELISABETH contacted Gordon Escobedo at [phone] and left a voicemail and followed up by email. Unable to update tx plan due to Carlos Eduardo Cardoso being absent from program. Crisis plan was sent to her for completion. Discharge still planned for Friday. Medications changes/added/denied? No    Treatment session number: N/A    Individual Case Management Visit provided today? No    [facility] follow up physician's orders: None at this time.

## 2023-09-13 NOTE — PLAN OF CARE
Problem: Potential for Falls  Goal: Patient will remain free of falls  Description: INTERVENTIONS:  - Educate patient/family on patient safety including physical limitations  - Instruct patient to call for assistance with activity       Problem: Potential for Falls  Goal: Patient will remain free of falls  Description: INTERVENTIONS:  - Educate patient/family on patient safety including physical limitations  - Instruct patient to call for assistance with activity   - Consult OT/PT to assist with strengthening/mobility   - K    - Consider moving patient to room near nurses station  Outcome: Progressing     Problem: INFECTION - ADULT  Goal: Absence or prevention of progression during hospitalization  Description: INTERVENTIONS:  - Assess and monitor for signs and symptoms of infection  - Monitor lab/diagnostic results  - Monitor all insertion sites, i.e. indwelling lines, tubes, and drains  - Monitor endotracheal if appropriate and nasal secretions for changes in amount and color  - [place] appropriate cooling/warming therapies per order  - Administer medications as ordered  - Instruct and encourage patient and family to use good hand hygiene technique  - Identify and instruct in appropriate isolation precautions for identified infection/condition  Outcome: Progressing     Problem: Knowledge Deficit  Goal: Patient/family/caregiver demonstrates understanding of disease process, treatment plan, medications, and discharge instructions  Description: Complete learning assessment and assess knowledge base.   Interventions:  - Provide teaching at level of understanding  - Provide teaching via preferred learning methods  Outcome: Progressing     - Initiate and maintain comfort rounds  - Make Fall Risk Sign visible to staff  -  - Consider moving patient to room near nurses station  Outcome: Progressing     Problem: INFECTION - ADULT  Goal: Absence or prevention of progression during hospitalization  Description: INTERVENTIONS:  - Assess and monitor for signs and symptoms of infection  - Monitor lab/diagnostic results  - Monitor all insertion sites, i.e. indwelling lines, tubes, and drains  - Monitor endotracheal if appropriate and nasal secretions for changes in amount and color  - [place] appropriate cooling/warming therapies per order  - Administer medications as ordered  - Instruct and encourage patient and family to use good hand hygiene technique  - Identify and instruct in appropriate isolation precautions for identified infection/condition  Outcome: Progressing     Problem: Knowledge Deficit  Goal: Patient/family/caregiver demonstrates understanding of disease process, treatment plan, medications, and discharge instructions  Description: Complete learning assessment and assess knowledge base.   Interventions:  - Provide teaching at level of understanding  - Provide teaching via preferred learning methods  Outcome: Progressing

## 2023-09-14 ENCOUNTER — DOCUMENTATION (OUTPATIENT)
Dept: PSYCHOLOGY | Facility: CLINIC | Age: 48
End: 2023-09-14

## 2023-09-14 ENCOUNTER — APPOINTMENT (OUTPATIENT)
Dept: PSYCHOLOGY | Facility: CLINIC | Age: 48
End: 2023-09-14
Payer: COMMERCIAL

## 2023-09-14 ENCOUNTER — OFFICE VISIT (OUTPATIENT)
Dept: GASTROENTEROLOGY | Facility: CLINIC | Age: 48
End: 2023-09-14
Payer: COMMERCIAL

## 2023-09-14 ENCOUNTER — HOSPITAL ENCOUNTER (OUTPATIENT)
Dept: INFUSION CENTER | Facility: HOSPITAL | Age: 48
Discharge: HOME | End: 2023-09-14
Attending: INTERNAL MEDICINE
Payer: COMMERCIAL

## 2023-09-14 VITALS
SYSTOLIC BLOOD PRESSURE: 119 MMHG | HEART RATE: 92 BPM | DIASTOLIC BLOOD PRESSURE: 82 MMHG | OXYGEN SATURATION: 98 % | RESPIRATION RATE: 18 BRPM | TEMPERATURE: 97.3 F

## 2023-09-14 VITALS
HEIGHT: 65 IN | TEMPERATURE: 98.3 F | BODY MASS INDEX: 26.82 KG/M2 | OXYGEN SATURATION: 95 % | HEART RATE: 106 BPM | DIASTOLIC BLOOD PRESSURE: 85 MMHG | SYSTOLIC BLOOD PRESSURE: 124 MMHG | WEIGHT: 161 LBS

## 2023-09-14 DIAGNOSIS — E44.1 MILD PROTEIN-CALORIE MALNUTRITION (HCC): ICD-10-CM

## 2023-09-14 DIAGNOSIS — K92.1 BLACK STOOL: ICD-10-CM

## 2023-09-14 DIAGNOSIS — D89.40 MAST CELL ACTIVATION SYNDROME (HCC): ICD-10-CM

## 2023-09-14 DIAGNOSIS — R11.10 VOMITING AND DIARRHEA: ICD-10-CM

## 2023-09-14 DIAGNOSIS — K62.5 RECTAL BLEEDING: ICD-10-CM

## 2023-09-14 DIAGNOSIS — R19.7 VOMITING AND DIARRHEA: ICD-10-CM

## 2023-09-14 DIAGNOSIS — R10.11 RIGHT UPPER QUADRANT ABDOMINAL PAIN: ICD-10-CM

## 2023-09-14 DIAGNOSIS — K62.5 BRBPR (BRIGHT RED BLOOD PER RECTUM): ICD-10-CM

## 2023-09-14 DIAGNOSIS — R63.0 ANOREXIA: Primary | ICD-10-CM

## 2023-09-14 DIAGNOSIS — L50.1 CHRONIC IDIOPATHIC URTICARIA: ICD-10-CM

## 2023-09-14 DIAGNOSIS — R11.0 NAUSEA: ICD-10-CM

## 2023-09-14 DIAGNOSIS — F41.1 GENERALIZED ANXIETY DISORDER: Primary | Chronic | ICD-10-CM

## 2023-09-14 DIAGNOSIS — D84.9 IMMUNOSUPPRESSED STATUS (HCC): ICD-10-CM

## 2023-09-14 DIAGNOSIS — D47.3 ESSENTIAL (HEMORRHAGIC) THROMBOCYTHEMIA (HCC): ICD-10-CM

## 2023-09-14 DIAGNOSIS — K50.119 CROHN'S DISEASE OF COLON WITH COMPLICATION (HCC): Primary | ICD-10-CM

## 2023-09-14 DIAGNOSIS — F33.41 RECURRENT MAJOR DEPRESSIVE DISORDER, IN PARTIAL REMISSION (HCC): ICD-10-CM

## 2023-09-14 PROCEDURE — 96361 HYDRATE IV INFUSION ADD-ON: CPT

## 2023-09-14 PROCEDURE — 96375 TX/PRO/DX INJ NEW DRUG ADDON: CPT

## 2023-09-14 PROCEDURE — 96372 THER/PROPH/DIAG INJ SC/IM: CPT

## 2023-09-14 PROCEDURE — 96367 TX/PROPH/DG ADDL SEQ IV INF: CPT

## 2023-09-14 PROCEDURE — 96365 THER/PROPH/DIAG IV INF INIT: CPT

## 2023-09-14 PROCEDURE — 99214 OFFICE O/P EST MOD 30 MIN: CPT | Performed by: INTERNAL MEDICINE

## 2023-09-14 RX ORDER — EPINEPHRINE 1 MG/ML
0.3 INJECTION, SOLUTION, CONCENTRATE INTRAVENOUS
Status: DISCONTINUED | OUTPATIENT
Start: 2023-09-14 | End: 2023-09-17 | Stop reason: HOSPADM

## 2023-09-14 RX ORDER — METHYLPREDNISOLONE SODIUM SUCCINATE 40 MG/ML
40 INJECTION, POWDER, LYOPHILIZED, FOR SOLUTION INTRAMUSCULAR; INTRAVENOUS ONCE
Status: COMPLETED | OUTPATIENT
Start: 2023-09-14 | End: 2023-09-14

## 2023-09-14 RX ORDER — METHYLPREDNISOLONE SODIUM SUCCINATE 40 MG/ML
40 INJECTION, POWDER, LYOPHILIZED, FOR SOLUTION INTRAMUSCULAR; INTRAVENOUS ONCE
Status: CANCELLED
Start: 2023-09-19 | End: 2023-09-15

## 2023-09-14 RX ORDER — ARIPIPRAZOLE 1 MG/ML
10 SOLUTION ORAL
COMMUNITY
Start: 2023-08-23

## 2023-09-14 RX ORDER — EPINEPHRINE 1 MG/ML
0.3 INJECTION, SOLUTION, CONCENTRATE INTRAVENOUS
OUTPATIENT
Start: 2023-10-02

## 2023-09-14 RX ADMIN — IRON SUCROSE 200 MG: 20 INJECTION, SOLUTION INTRAVENOUS at 10:10

## 2023-09-14 RX ADMIN — METHYLPREDNISOLONE SODIUM SUCCINATE 40 MG: 40 INJECTION, POWDER, FOR SOLUTION INTRAMUSCULAR; INTRAVENOUS at 09:36

## 2023-09-14 RX ADMIN — DIPHENHYDRAMINE HYDROCHLORIDE 50 MG: 50 INJECTION, SOLUTION INTRAMUSCULAR; INTRAVENOUS at 09:02

## 2023-09-14 RX ADMIN — OMALIZUMAB 300 MG: 150 INJECTION, SOLUTION SUBCUTANEOUS at 11:30

## 2023-09-14 RX ADMIN — SODIUM CHLORIDE 1000 ML: 0.9 INJECTION, SOLUTION INTRAVENOUS at 08:58

## 2023-09-14 NOTE — PROGRESS NOTES
[facility] Discharge Instructions:   Disposition: Home  Address: [address] [place] [id number] . Diagnosis:  1. Generalized anxiety disorder        2. Recurrent major depressive disorder, in partial remission ([address])          . Allergies (Drug/Food):    Allergies   Allergen Reactions   • Aimovig [Erenumab-Aooe] Anaphylaxis   • Amitriptyline Anaphylaxis     According to the patient she had nphylaxis   • Aspergillus Allergy Skin Test Other (See Comments), Hives and Swelling   • Azathioprine Other (See Comments) and Anaphylaxis     pancreatitis  According to patient she needed Epipen   • Banana - Food Allergy Itching, Swelling and Anaphylaxis   • Buspar [Buspirone] Hives and Shortness Of Breath   • Citric Acid-Polysorbate 80 Abdominal Pain, Anaphylaxis, Anxiety, Bradycardia, Diarrhea, Fatigue, GI Intolerance, Headache, Hives, Hyperactivity, Itching, Lip Swelling, Nausea Only, Palpitations, Rash, Shortness Of Breath, Tachycardia, Throat Swelling, Tongue Swelling and Vomiting   • Corn Dextrin Anaphylaxis     Any corn based products   • Eggs Or Egg-Derived Products - Food Allergy Anaphylaxis   • Epinephrine Anaphylaxis   • Erenumab Anaphylaxis     patient sent portal message stating she had anaphylaxis  patient sent portal message stating she had anaphylaxis     • Gadolinium Abdominal Pain, Anaphylaxis, Anxiety, Confusion, Delirium, Dizziness, Eye Swelling, Fatigue, GI Intolerance, Headache, Hives, Irritability, Itching, Lip Swelling, Nausea Only, Palpitations, Photosensitivity, Rash, Seizures, Shortness Of Breath, Swelling, Syncope, Throat Swelling, Tongue Swelling, Tremor, Visual Disturbance and Vomiting   • Gelatin - Food Allergy Anaphylaxis   • Heparin Hives     Painful welts    • Lactated Ringers Shortness Of Breath     Pt questions this allergy, pt has received LR multiple times without reaction   • Lavender Oil Anaphylaxis     Other reaction(s): anaphalxis   • Malto Dextrin [Dextrin] Anaphylaxis   • Other Anaphylaxis     Gel caps, maltodextrose, dextrose,grapes, lavender    • Penicillium Notatum Shortness Of Breath and Swelling   • Red Dye - Food Allergy Other (See Comments) and Anaphylaxis     intolerance   • Sumatriptan Anaphylaxis     Bradycardia, sob, back pressure, head pain,throat tightness  According to the patient she had anphylaxis   • Ustekinumab Anaphylaxis   • Contrast [Iodinated Contrast Media] Other (See Comments)     Pt states needs to be premedicated prior to injection   • Corn Oil - Food Allergy - Food Allergy Hives   • Humira [Adalimumab] Other (See Comments)     "I develop drug induced lupus"   • Ibuprofen Hives and Throat Swelling   • Polyethylene Glycol Diarrhea and Vomiting   • Remicade [Infliximab] Other (See Comments)     "I develop drug induced lupus"   • Soy Allergy - Food Allergy Other (See Comments)   • Sulfa Antibiotics Hives and Other (See Comments)   • Sulfate Hives   • Sulfites - Food Allergy Hives   • Sweet Corn Extract Skin Test - Food Allergy Hives and Itching   • Chlorhexidine Itching   • Freederm Adhesive Remover [New Skin] Rash   • Histamine Hives, Rash and Other (See Comments)     Intolerance         Activity: No recommendations   Diet:no recommendations  Smoking Cessation:not a smoker   Diagnostic/Laboratory Orders: none at this time     Vaccines: If you received a vaccine, please notify your family physician on your next visit. For more information, please call [phone]. Follow-up appointments/Referrals:   Current Psychiatrist/Therapist:   Medication Management:   [facility]  [phone]([phone](X)  Follow up: 10/2/23 at 2pm     Outpatient Therapy:   Holly Marquez  [address] [facility].   SHAHANA, [address] [place] [address]  [phone]  Follow up: 9/21/23 at 3pm          Primary Care Physician:   Dimple [facility] [address] (D) [phone] (T) [phone]      Being transferred to   [facility]  [address] [address]  [phone] (U) [phone](N)     Outpatient and/or Partial and Other Community Resources Used (CTT, ICM, VNA): denies       [facility]: SHAHANA [phone] and Anette [phone]. Intake/Referral/Evaluation (Non-Emergency) *NON INSURED FOR FUNDING: [facility]: [phone], [place] [facility]: [phone], [place]: [phone] and [place]: [phone]. Crisis Intervention (Emergency) [place] Service: [facility]: [phone], [place]: [phone], [address] [place] [address]: [phone], [facility]): [phone], [facility]: [phone] and [facility]: [phone]. _________________________________  National Crisis Intervention Hotline: 1-920.129.8935. National Suicide Crisis Hotline: 1-275.129.9941. I, the undersigned, have received and understand the above instructions.         Patient/Rep Signature: __________________________________       Date/Time: ______________       Physician Signature: ____________________________________      Date/Time: ______________               Signature: ________________________________       Date/Time: ______________

## 2023-09-14 NOTE — PROGRESS NOTES
[facility] - Outpatient Follow-up Note  Lorenza Dang 46 y.o. female MRN: [id number]  Encounter: [id number]          ASSESSMENT AND PLAN:    Lorenza Dang is a 46 y.o. female with hereditary alphatryptesemia, Hashimoto's, inflammatory bowel disease thought to be Crohn's versus indeterminate colitis previously on anti-TNF therapy but complicated by drug-induced lupus for which she was switched to [facility] but with loss of response, prior diagnosis of mast cell activation syndrome, who presents for follow-up, last seen approximately April 2023. She has been having some good days and bad days. Some intermittent blood in the stool. Possible fluid retention. Endoscopy and colonoscopy October 2021 grossly normal and biopsies with patchy chronic active left-sided colitis and some quiescent colitis. EGD with erythema and erosion in the stomach but otherwise normal.  May 2023 endoscopy with some erythema and erosion in the stomach, colonoscopy with mild patchy erythema and granular mucosa in the left side of the colon; biopsies with inactive gastritis, chronic active colitis along the left colon with nearly of normal mucosa at 20 cm but active inflammation in the rectum and at 30 cm. MRI/MRCP with no interval progression of mild central intrahepatic and extrahepatic bile duct prominence. Prior abdominal ultrasound with cholecystectomy. Most recent MRCP again with dilated common bile duct that remains unchanged and distal periampullary portion of the common bile duct is not visualized suggesting a distal biliary stricture as well as hepatic steatosis. CT chest abdomen pelvis with concern for a pleural-based lesion, as well as trace pneumobilia and slight extrahepatic biliary ductal dilatation. Most recent CMP normal.  Iron low at 37 and iron saturation 11. CBC with platelet count of 758 but otherwise relatively normal.  INR normal.  CRP 4.5. 1.  Crohn's disease of colon with complication ([address])    2. Rectal bleeding    3. Right upper quadrant abdominal pain    4. Mild protein-calorie malnutrition ([address])    5. 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[address])    6. Immunosuppressed status ([address])    7. Nausea    8. BRBPR (bright red blood per rectum)    9. Black stool    10. Essential (hemorrhagic) thrombocythemia ([address])        Orders Placed This Encounter   Procedures   • EGD     Continue Skyrizi  Flex sig next week. Will ad don EGD  Continue prednisone 10mg  Next colonoscopy in 2024  Next enterography TBD  Next blood work ordered today  Next quant gold and hepatitis panel ordered today  Continue Pepcid    Consider ERCP given concern for CBD stricture at the distal duct    CT chest ordered today given concern for pleural-based opacity seen on prior CT chest from July. She is currently receiving intermittent fluid/hydration as per the recommendation of her mast cell activation specialist at the [facility].  Iron infusions    Low FODMAP diet, whole foods. Continue to follow-up with multiple specialists including rheumatology, immunology, neurology, pulmonology, rheumatology. Continue vitamin supplementation    Consider addition of Turmeric and Jessica-Kinjal    General HCM recommendations:  Avoid live virus vaccines  Yearly flu shot  COVID vaccine and booster  Pneumonia vaccine  Shingrix  Routine Pap smear  Routine mammograms  Routine skin exams with the dermatologist    ______________________________________________________________________    SUBJECTIVE:    Gordon Escobeod is a 46 y.o. female who presents with complaint of Crohn's. She received Skyrizi yesterday and iron today. She is feeling tired now. She put on 20 lbs and feels gassy and bloated. She did not notice any blood with the gas. 5-8 BMs per day, small and greasy in appearance, nothing formed  She had some episodes with some bleeding. This is her second week at 10mg. She has a flex sig scheduled for next week.    Bloating since she had a cystosocpy and bladder procedure. The days she is active. Sometimes her weight goes up and down on the scale 10 lbs in less than a day. Not moving her bowels always normal and gas. Some bloating. She eats rice crispies, rice checks, bagel, fruit. Lunch is a salad or sandwhich. REVIEW OF SYSTEMS IS OTHERWISE NEGATIVE. 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 pt denies, documented in cardio note   • Pancreatitis     sphinger of    • Psychiatric disorder    • RA (rheumatoid arthritis) ([address])    • Seizures ([address])    • Ulcerative colitis ([address])    • Visual impairment      Past Surgical History:   Procedure Laterality Date   • ABDOMINAL SURGERY  2017   • APPENDECTOMY     • CHOLECYSTECTOMY     • COLONOSCOPY  2019   • CYSTOSCOPY N/A 6/8/2023    Procedure: CYSTOSCOPY, mini TURBT with fulgeration;  Surgeon: Jax Bond MD;  Location: [facility];  Service: Urology   • EGD AND COLONOSCOPY N/A 09/12/2017    Procedure: EGD AND COLONOSCOPY;  Surgeon: Naya Escobedo MD;  Location: [facility]; Service: Gastroenterology   • ERCP N/A 09/15/2017    Procedure: ENDOSCOPIC RETROGRADE CHOLANGIOPANCREATOGRAPHY (ERCP); Surgeon: Karen Posadas MD;  Location[facility];   Service: Gastroenterology   • HYSTERECTOMY     • KNEE SURGERY Right    • LAPAROSCOPIC ENDOMETRIOSIS FULGURATION     • ORIF ANKLE FRACTURE Left    • TX ARTHRS KNE SURG W/MENISCECTOMY MED/LAT W/SHVG Left 05/12/2017    Procedure: POSSIBLE MEDIAL MENISECTOMY;  Surgeon: Barney Yeboah MD;  Location: [facility];  Service: Orthopedics   • TX ARTHRS KNEE DEBRIDEMENT/SHAVING ARTCLR CRTLG Left 05/12/2017    Procedure: KNEE ARTHROSCOPY EVALUATION, CHONDROPLASTY;  Surgeon: Barney Yeboah MD;  Location: [facility];  Service: Orthopedics   • TX LAPS ABD PRTM&OMENTUM DX W/WO [facility] BR/WA [facility] N/A 12/12/2017    Procedure: LAPAROSCOPY DIAGNOSTIC  WITH LYSIS OF ADHESIONS;  Surgeon: Avinash Kiser DO;  Location:  MAIN OR;  Service: General   • UPPER GASTROINTESTINAL ENDOSCOPY  2019     Social History   Social History     Substance and Sexual Activity   Alcohol Use Never     Social History     Substance and Sexual Activity   Drug Use Never     Social History     Tobacco Use   Smoking Status Never   Smokeless Tobacco Never     Family History   Problem Relation Age of Onset   • Ulcerative colitis Mother    • Arthritis Mother    • Colon polyps Mother    • Heart failure Father    • Neuropathy Father    • Macular degeneration Father    • Diabetes Father    • Early death Father    • Vision loss Father    • Asthma Daughter    • Hypothyroidism Daughter    • Heart disease Maternal Grandmother    • Arthritis Maternal Grandmother    • No Known Problems Maternal Grandfather    • Arthritis Paternal Grandmother    • Macular degeneration Paternal Grandmother    • Vision loss Paternal Grandmother    • Lymphoma Paternal Grandfather    • Cancer Paternal Grandfather    • Early death Paternal Grandfather    • Breast cancer Maternal Aunt    • Cancer Maternal Aunt    • Miscarriages / Stillbirths Maternal Aunt    • Heart failure Paternal Aunt    • Heart failure Paternal Aunt    • Irritable bowel syndrome Paternal Aunt    • Breast cancer Paternal Aunt    • Breast cancer additional onset Paternal Aunt    • Hypothyroidism Paternal Aunt • Cancer Paternal Aunt    • No Known Problems Son    • No Known Problems Son    • Kidney disease Brother    • Depression Paternal Uncle    • Early death Paternal Uncle        Meds/Allergies       Current Outpatient Medications:   •  ARIPiprazole (ABILIFY) 10 mg tablet  •  B-D 3CC LUER-SHAMEKA SYR 25GX1" 25G X 1" 3 ML MISC  •  butalbital-acetaminophen-caffeine (FIORICET,ESGIC) -40 mg per tablet  •  cetirizine (ZyrTEC) 10 mg tablet  •  diclofenac (VOLTAREN) 75 mg EC tablet  •  EpiPen 2-Malachi 0.3 MG/0.3ML SOAJ  •  famotidine (PEPCID) 40 MG tablet  •  hydrOXYzine HCL (ATARAX) 25 mg tablet  •  Lactobacillus (PROBIOTIC ACIDOPHILUS PO)  •  Lactobacillus-Inulin (Culturelle Digestive Health) CAPS  •  levalbuterol (XOPENEX HFA) 45 mcg/act inhaler  •  levothyroxine 100 mcg tablet  •  liothyronine (CYTOMEL) 5 mcg tablet  •  melatonin 3 mg  •  montelukast (SINGULAIR) 10 mg tablet  •  NON FORMULARY  •  omalizumab (XOLAIR) 150 mg  •  pantoprazole (PROTONIX) 40 mg tablet  •  predniSONE 20 mg tablet  •  sertraline (ZOLOFT) 100 mg tablet  •  TechLite Lancets MISC  •  traZODone (DESYREL) 50 mg tablet  •  ARIPiprazole (ABILIFY) 1 mg/mL oral solution  •  Cholecalciferol 10 MCG /0.025ML LIQD  •  Melatonin 3 MG SUBL  •  nystatin (MYCOSTATIN) 500,000 units/5 mL suspension  •  traMADol (Ultram) 50 mg tablet  No current facility-administered medications for this visit.     Facility-Administered Medications Ordered in Other Visits:   •  EPINEPHrine PF (ADRENALIN) 1 mg/mL injection 0.3 mg, 0.3 mg, Intramuscular, Q15 Min PRN    Allergies   Allergen Reactions   • Aimovig [Erenumab-Aooe] Anaphylaxis   • Amitriptyline Anaphylaxis     According to the patient she had nphylaxis   • Aspergillus Allergy Skin Test Other (See Comments), Hives and Swelling   • Azathioprine Other (See Comments) and Anaphylaxis     pancreatitis  According to patient she needed Epipen   • Banana - Food Allergy Itching, Swelling and Anaphylaxis   • Buspar [Buspirone] Hives and Shortness Of Breath   • Citric Acid-Polysorbate 80 Abdominal Pain, Anaphylaxis, Anxiety, Bradycardia, Diarrhea, Fatigue, GI Intolerance, Headache, Hives, Hyperactivity, Itching, Lip Swelling, Nausea Only, Palpitations, Rash, Shortness Of Breath, Tachycardia, Throat Swelling, Tongue Swelling and Vomiting   • Corn Dextrin Anaphylaxis     Any corn based products   • Eggs Or Egg-Derived Products - Food Allergy Anaphylaxis   • Epinephrine Anaphylaxis   • Erenumab Anaphylaxis     patient sent portal message stating she had anaphylaxis  patient sent portal message stating she had anaphylaxis     • Gadolinium Abdominal Pain, Anaphylaxis, Anxiety, Confusion, Delirium, Dizziness, Eye Swelling, Fatigue, GI Intolerance, Headache, Hives, Irritability, Itching, Lip Swelling, Nausea Only, Palpitations, Photosensitivity, Rash, Seizures, Shortness Of Breath, Swelling, Syncope, Throat Swelling, Tongue Swelling, Tremor, Visual Disturbance and Vomiting   • Gelatin - Food Allergy Anaphylaxis   • Heparin Hives     Painful welts    • Lactated Ringers Shortness Of Breath     Pt questions this allergy, pt has received LR multiple times without reaction   • Lavender Oil Anaphylaxis     Other reaction(s): anaphalxis   • Malto Dextrin [Dextrin] Anaphylaxis   • Other Anaphylaxis     Gel caps, maltodextrose, dextrose,grapes, lavender    • Penicillium Notatum Shortness Of Breath and Swelling   • Red Dye - Food Allergy Other (See Comments) and Anaphylaxis     intolerance   • Sumatriptan Anaphylaxis     Bradycardia, sob, back pressure, head pain,throat tightness  According to the patient she had anphylaxis   • Ustekinumab Anaphylaxis   • Contrast [Iodinated Contrast Media] Other (See Comments)     Pt states needs to be premedicated prior to injection   • Corn Oil - Food Allergy - Food Allergy Hives   • Humira [Adalimumab] Other (See Comments)     "I develop drug induced lupus"   • Ibuprofen Hives and Throat Swelling   • Polyethylene Glycol Diarrhea and Vomiting   • Remicade [Infliximab] Other (See Comments)     "I develop drug induced lupus"   • Soy Allergy - Food Allergy Other (See Comments)   • Sulfa Antibiotics Hives and Other (See Comments)   • Sulfate Hives   • Sulfites - Food Allergy Hives   • Sweet Corn Extract Skin Test - Food Allergy Hives and Itching   • Chlorhexidine Itching   • Freederm Adhesive Remover [New Skin] Rash   • Histamine Hives, Rash and Other (See Comments)     Intolerance             Objective     Blood pressure 124/85, pulse (!) 106, temperature 98.3 °F (36.8 °C), temperature source Tympanic, height 5' 5.3" (1.659 m), weight 73 kg (161 lb), SpO2 95 %, not currently breastfeeding. Body mass index is 26.55 kg/m². PHYSICAL EXAMINATION:    General Appearance:   Alert, cooperative, no distress   HEENT:  Normocephalic, atraumatic, anicteric. Neck supple, symmetrical, trachea midline. Lungs:   Equal chest rise and unlabored breathing, normal effort, no coughing. Cardiovascular:   No visualized JVD. Abdomen:   No abdominal distension. Skin:   No jaundice, rashes, or lesions. Musculoskeletal:   Normal range of motion visualized. Psych:  Normal affect and normal insight. Neuro:  Alert and appropriate. Lab Results:   No visits with results within 1 Day(s) from this visit.    Latest known visit with results is:   Appointment on 09/02/2023   Component Date Value   • WBC 09/02/2023 7.71    • RBC 09/02/2023 4.69    • Hemoglobin 09/02/2023 13.5    • Hematocrit 09/02/2023 43.2    • MCV 09/02/2023 92    • MCH 09/02/2023 28.8    • MCHC 09/02/2023 31.3 (L)    • RDW 09/02/2023 14.7    • MPV 09/02/2023 9.7    • Platelets 42/26/5090 492 (H)    • nRBC 09/02/2023 0    • Neutrophils Relative 09/02/2023 50    • Immat GRANS % 09/02/2023 0    • Lymphocytes Relative 09/02/2023 35    • Monocytes Relative 09/02/2023 10    • Eosinophils Relative 09/02/2023 3    • Basophils Relative 09/02/2023 2 (H)    • Neutrophils Absolute 09/02/2023 3.90    • Immature Grans Absolute 09/02/2023 0.02    • Lymphocytes Absolute 09/02/2023 2.71    • Monocytes Absolute 09/02/2023 0.76    • Eosinophils Absolute 09/02/2023 0.20    • Basophils Absolute 09/02/2023 0.12 (H)    • CRP 09/02/2023 4.5 (H)    • Sodium 09/02/2023 139    • Potassium 09/02/2023 3.9    • Chloride 09/02/2023 103    • CO2 09/02/2023 28    • ANION GAP 09/02/2023 8    • BUN 09/02/2023 11    • Creatinine 09/02/2023 0.71    • Glucose, Fasting 09/02/2023 83    • Calcium 09/02/2023 9.3    • AST 09/02/2023 20    • ALT 09/02/2023 26    • Alkaline Phosphatase 09/02/2023 56    • Total Protein 09/02/2023 7.1    • Albumin 09/02/2023 4.1    • Total Bilirubin 09/02/2023 0.42    • eGFR 09/02/2023 99    • Protime 09/02/2023 12.2    • INR 09/02/2023 0.91    • Iron Saturation 09/02/2023 11 (L)    • TIBC 09/02/2023 350    • Iron 09/02/2023 37 (L)    • UIBC 09/02/2023 313    • Ferritin 09/02/2023 15        Lab Results   Component Value Date    WBC 7.71 09/02/2023    HGB 13.5 09/02/2023    HCT 43.2 09/02/2023    MCV 92 09/02/2023     (H) 09/02/2023       Lab Results   Component Value Date     01/16/2015    SODIUM 139 09/02/2023    K 3.9 09/02/2023     09/02/2023    CO2 28 09/02/2023    ANIONGAP 12 01/16/2015    AGAP 8 09/02/2023    BUN 11 09/02/2023    CREATININE 0.71 09/02/2023    GLUC 173 (H) 08/28/2023    GLUF 83 09/02/2023    CALCIUM 9.3 09/02/2023    AST 20 09/02/2023    ALT 26 09/02/2023    ALKPHOS 56 09/02/2023    PROT 7.7 01/16/2015    TP 7.1 09/02/2023    BILITOT 0.7 01/16/2015    TBILI 0.42 09/02/2023    EGFR 99 09/02/2023       Lab Results   Component Value Date    CRP 4.5 (H) 09/02/2023       Lab Results   Component Value Date    ECM5SYIFUGFR 0.342 (L) 07/21/2023       Lab Results   Component Value Date    IRON 37 (L) 09/02/2023    TIBC 350 09/02/2023    FERRITIN 15 09/02/2023       Radiology Results:   No results found.

## 2023-09-14 NOTE — H&P (VIEW-ONLY)
[facility] - Outpatient Follow-up Note  Julien Reyes 46 y.o. female MRN: [id number]  Encounter: [id number]          ASSESSMENT AND PLAN:    Julien Reyes is a 46 y.o. female with hereditary alphatryptesemia, Hashimoto's, inflammatory bowel disease thought to be Crohn's versus indeterminate colitis previously on anti-TNF therapy but complicated by drug-induced lupus for which she was switched to [facility] but with loss of response, prior diagnosis of mast cell activation syndrome, who presents for follow-up, last seen approximately April 2023. She has been having some good days and bad days. Some intermittent blood in the stool. Possible fluid retention. Endoscopy and colonoscopy October 2021 grossly normal and biopsies with patchy chronic active left-sided colitis and some quiescent colitis. EGD with erythema and erosion in the stomach but otherwise normal.  May 2023 endoscopy with some erythema and erosion in the stomach, colonoscopy with mild patchy erythema and granular mucosa in the left side of the colon; biopsies with inactive gastritis, chronic active colitis along the left colon with nearly of normal mucosa at 20 cm but active inflammation in the rectum and at 30 cm. MRI/MRCP with no interval progression of mild central intrahepatic and extrahepatic bile duct prominence. Prior abdominal ultrasound with cholecystectomy. Most recent MRCP again with dilated common bile duct that remains unchanged and distal periampullary portion of the common bile duct is not visualized suggesting a distal biliary stricture as well as hepatic steatosis. CT chest abdomen pelvis with concern for a pleural-based lesion, as well as trace pneumobilia and slight extrahepatic biliary ductal dilatation. Most recent CMP normal.  Iron low at 37 and iron saturation 11. CBC with platelet count of 879 but otherwise relatively normal.  INR normal.  CRP 4.5. 1.  Crohn's disease of colon with complication ([address])    2. Rectal bleeding    3. Right upper quadrant abdominal pain    4. Mild protein-calorie malnutrition ([address])    5. 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[address])    6. Immunosuppressed status ([address])    7. Nausea    8. BRBPR (bright red blood per rectum)    9. Black stool    10. Essential (hemorrhagic) thrombocythemia ([address])        Orders Placed This Encounter   Procedures   • EGD     Continue Skyrizi  Flex sig next week. Will ad don EGD  Continue prednisone 10mg  Next colonoscopy in 2024  Next enterography TBD  Next blood work ordered today  Next quant gold and hepatitis panel ordered today  Continue Pepcid    Consider ERCP given concern for CBD stricture at the distal duct    CT chest ordered today given concern for pleural-based opacity seen on prior CT chest from July. She is currently receiving intermittent fluid/hydration as per the recommendation of her mast cell activation specialist at the [facility].  Iron infusions    Low FODMAP diet, whole foods. Continue to follow-up with multiple specialists including rheumatology, immunology, neurology, pulmonology, rheumatology. Continue vitamin supplementation    Consider addition of Turmeric and Jessica-Kinjal    General HCM recommendations:  Avoid live virus vaccines  Yearly flu shot  COVID vaccine and booster  Pneumonia vaccine  Shingrix  Routine Pap smear  Routine mammograms  Routine skin exams with the dermatologist    ______________________________________________________________________    SUBJECTIVE:    Francisco J Martines is a 46 y.o. female who presents with complaint of Crohn's. She received Skyrizi yesterday and iron today. She is feeling tired now. She put on 20 lbs and feels gassy and bloated. She did not notice any blood with the gas. 5-8 BMs per day, small and greasy in appearance, nothing formed  She had some episodes with some bleeding. This is her second week at 10mg. She has a flex sig scheduled for next week.    Bloating since she had a cystosocpy and bladder procedure. The days she is active. Sometimes her weight goes up and down on the scale 10 lbs in less than a day. Not moving her bowels always normal and gas. Some bloating. She eats rice crispies, rice checks, bagel, fruit. Lunch is a salad or sandwhich. REVIEW OF SYSTEMS IS OTHERWISE NEGATIVE. 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 pt denies, documented in cardio note   • Pancreatitis     sphinger of    • Psychiatric disorder    • RA (rheumatoid arthritis) ([address])    • Seizures ([address])    • Ulcerative colitis ([address])    • Visual impairment      Past Surgical History:   Procedure Laterality Date   • ABDOMINAL SURGERY  2017   • APPENDECTOMY     • CHOLECYSTECTOMY     • COLONOSCOPY  2019   • CYSTOSCOPY N/A 6/8/2023    Procedure: CYSTOSCOPY, mini TURBT with fulgeration;  Surgeon: Kanika Byrd MD;  Location: [facility];  Service: Urology   • EGD AND COLONOSCOPY N/A 09/12/2017    Procedure: EGD AND COLONOSCOPY;  Surgeon: Jeyson Zamora MD;  Location: [facility]; Service: Gastroenterology   • ERCP N/A 09/15/2017    Procedure: ENDOSCOPIC RETROGRADE CHOLANGIOPANCREATOGRAPHY (ERCP); Surgeon: Tabatha Echols MD;  [facility];   Service: Gastroenterology   • HYSTERECTOMY     • KNEE SURGERY Right    • LAPAROSCOPIC ENDOMETRIOSIS FULGURATION     • ORIF ANKLE FRACTURE Left    • NJ ARTHRS KNE SURG W/MENISCECTOMY MED/LAT W/SHVG Left 05/12/2017    Procedure: POSSIBLE MEDIAL MENISECTOMY;  Surgeon: Michoacano Sandoval MD;  Location: [facility];  Service: Orthopedics   • NJ ARTHRS KNEE DEBRIDEMENT/SHAVING ARTCLR CRTLG Left 05/12/2017    Procedure: KNEE ARTHROSCOPY EVALUATION, CHONDROPLASTY;  Surgeon: Michoacano Sandoval MD;  Location: [facility];  Service: Orthopedics   • NJ LAPS ABD PRTM&OMENTUM DX W/WO [facility]/WA [facility] N/A 12/12/2017    Procedure: LAPAROSCOPY DIAGNOSTIC  WITH LYSIS OF ADHESIONS;  Surgeon: Katya Leggett DO;  Location:  MAIN OR;  Service: General   • UPPER GASTROINTESTINAL ENDOSCOPY  2019     Social History   Social History     Substance and Sexual Activity   Alcohol Use Never     Social History     Substance and Sexual Activity   Drug Use Never     Social History     Tobacco Use   Smoking Status Never   Smokeless Tobacco Never     Family History   Problem Relation Age of Onset   • Ulcerative colitis Mother    • Arthritis Mother    • Colon polyps Mother    • Heart failure Father    • Neuropathy Father    • Macular degeneration Father    • Diabetes Father    • Early death Father    • Vision loss Father    • Asthma Daughter    • Hypothyroidism Daughter    • Heart disease Maternal Grandmother    • Arthritis Maternal Grandmother    • No Known Problems Maternal Grandfather    • Arthritis Paternal Grandmother    • Macular degeneration Paternal Grandmother    • Vision loss Paternal Grandmother    • Lymphoma Paternal Grandfather    • Cancer Paternal Grandfather    • Early death Paternal Grandfather    • Breast cancer Maternal Aunt    • Cancer Maternal Aunt    • Miscarriages / Stillbirths Maternal Aunt    • Heart failure Paternal Aunt    • Heart failure Paternal Aunt    • Irritable bowel syndrome Paternal Aunt    • Breast cancer Paternal Aunt    • Breast cancer additional onset Paternal Aunt    • Hypothyroidism Paternal Aunt • Cancer Paternal Aunt    • No Known Problems Son    • No Known Problems Son    • Kidney disease Brother    • Depression Paternal Uncle    • Early death Paternal Uncle        Meds/Allergies       Current Outpatient Medications:   •  ARIPiprazole (ABILIFY) 10 mg tablet  •  B-D 3CC LUER-SHAMEKA SYR 25GX1" 25G X 1" 3 ML MISC  •  butalbital-acetaminophen-caffeine (FIORICET,ESGIC) -40 mg per tablet  •  cetirizine (ZyrTEC) 10 mg tablet  •  diclofenac (VOLTAREN) 75 mg EC tablet  •  EpiPen 2-Malachi 0.3 MG/0.3ML SOAJ  •  famotidine (PEPCID) 40 MG tablet  •  hydrOXYzine HCL (ATARAX) 25 mg tablet  •  Lactobacillus (PROBIOTIC ACIDOPHILUS PO)  •  Lactobacillus-Inulin (Culturelle Digestive Health) CAPS  •  levalbuterol (XOPENEX HFA) 45 mcg/act inhaler  •  levothyroxine 100 mcg tablet  •  liothyronine (CYTOMEL) 5 mcg tablet  •  melatonin 3 mg  •  montelukast (SINGULAIR) 10 mg tablet  •  NON FORMULARY  •  omalizumab (XOLAIR) 150 mg  •  pantoprazole (PROTONIX) 40 mg tablet  •  predniSONE 20 mg tablet  •  sertraline (ZOLOFT) 100 mg tablet  •  TechLite Lancets MISC  •  traZODone (DESYREL) 50 mg tablet  •  ARIPiprazole (ABILIFY) 1 mg/mL oral solution  •  Cholecalciferol 10 MCG /0.025ML LIQD  •  Melatonin 3 MG SUBL  •  nystatin (MYCOSTATIN) 500,000 units/5 mL suspension  •  traMADol (Ultram) 50 mg tablet  No current facility-administered medications for this visit.     Facility-Administered Medications Ordered in Other Visits:   •  EPINEPHrine PF (ADRENALIN) 1 mg/mL injection 0.3 mg, 0.3 mg, Intramuscular, Q15 Min PRN    Allergies   Allergen Reactions   • Aimovig [Erenumab-Aooe] Anaphylaxis   • Amitriptyline Anaphylaxis     According to the patient she had nphylaxis   • Aspergillus Allergy Skin Test Other (See Comments), Hives and Swelling   • Azathioprine Other (See Comments) and Anaphylaxis     pancreatitis  According to patient she needed Epipen   • Banana - Food Allergy Itching, Swelling and Anaphylaxis   • Buspar [Buspirone] Hives and Shortness Of Breath   • Citric Acid-Polysorbate 80 Abdominal Pain, Anaphylaxis, Anxiety, Bradycardia, Diarrhea, Fatigue, GI Intolerance, Headache, Hives, Hyperactivity, Itching, Lip Swelling, Nausea Only, Palpitations, Rash, Shortness Of Breath, Tachycardia, Throat Swelling, Tongue Swelling and Vomiting   • Corn Dextrin Anaphylaxis     Any corn based products   • Eggs Or Egg-Derived Products - Food Allergy Anaphylaxis   • Epinephrine Anaphylaxis   • Erenumab Anaphylaxis     patient sent portal message stating she had anaphylaxis  patient sent portal message stating she had anaphylaxis     • Gadolinium Abdominal Pain, Anaphylaxis, Anxiety, Confusion, Delirium, Dizziness, Eye Swelling, Fatigue, GI Intolerance, Headache, Hives, Irritability, Itching, Lip Swelling, Nausea Only, Palpitations, Photosensitivity, Rash, Seizures, Shortness Of Breath, Swelling, Syncope, Throat Swelling, Tongue Swelling, Tremor, Visual Disturbance and Vomiting   • Gelatin - Food Allergy Anaphylaxis   • Heparin Hives     Painful welts    • Lactated Ringers Shortness Of Breath     Pt questions this allergy, pt has received LR multiple times without reaction   • Lavender Oil Anaphylaxis     Other reaction(s): anaphalxis   • Malto Dextrin [Dextrin] Anaphylaxis   • Other Anaphylaxis     Gel caps, maltodextrose, dextrose,grapes, lavender    • Penicillium Notatum Shortness Of Breath and Swelling   • Red Dye - Food Allergy Other (See Comments) and Anaphylaxis     intolerance   • Sumatriptan Anaphylaxis     Bradycardia, sob, back pressure, head pain,throat tightness  According to the patient she had anphylaxis   • Ustekinumab Anaphylaxis   • Contrast [Iodinated Contrast Media] Other (See Comments)     Pt states needs to be premedicated prior to injection   • Corn Oil - Food Allergy - Food Allergy Hives   • Humira [Adalimumab] Other (See Comments)     "I develop drug induced lupus"   • Ibuprofen Hives and Throat Swelling   • Polyethylene Glycol Diarrhea and Vomiting   • Remicade [Infliximab] Other (See Comments)     "I develop drug induced lupus"   • Soy Allergy - Food Allergy Other (See Comments)   • Sulfa Antibiotics Hives and Other (See Comments)   • Sulfate Hives   • Sulfites - Food Allergy Hives   • Sweet Corn Extract Skin Test - Food Allergy Hives and Itching   • Chlorhexidine Itching   • Freederm Adhesive Remover [New Skin] Rash   • Histamine Hives, Rash and Other (See Comments)     Intolerance             Objective     Blood pressure 124/85, pulse (!) 106, temperature 98.3 °F (36.8 °C), temperature source Tympanic, height 5' 5.3" (1.659 m), weight 73 kg (161 lb), SpO2 95 %, not currently breastfeeding. Body mass index is 26.55 kg/m². PHYSICAL EXAMINATION:    General Appearance:   Alert, cooperative, no distress   HEENT:  Normocephalic, atraumatic, anicteric. Neck supple, symmetrical, trachea midline. Lungs:   Equal chest rise and unlabored breathing, normal effort, no coughing. Cardiovascular:   No visualized JVD. Abdomen:   No abdominal distension. Skin:   No jaundice, rashes, or lesions. Musculoskeletal:   Normal range of motion visualized. Psych:  Normal affect and normal insight. Neuro:  Alert and appropriate. Lab Results:   No visits with results within 1 Day(s) from this visit.    Latest known visit with results is:   Appointment on 09/02/2023   Component Date Value   • WBC 09/02/2023 7.71    • RBC 09/02/2023 4.69    • Hemoglobin 09/02/2023 13.5    • Hematocrit 09/02/2023 43.2    • MCV 09/02/2023 92    • MCH 09/02/2023 28.8    • MCHC 09/02/2023 31.3 (L)    • RDW 09/02/2023 14.7    • MPV 09/02/2023 9.7    • Platelets 87/53/3173 492 (H)    • nRBC 09/02/2023 0    • Neutrophils Relative 09/02/2023 50    • Immat GRANS % 09/02/2023 0    • Lymphocytes Relative 09/02/2023 35    • Monocytes Relative 09/02/2023 10    • Eosinophils Relative 09/02/2023 3    • Basophils Relative 09/02/2023 2 (H)    • Neutrophils Absolute 09/02/2023 3.90    • Immature Grans Absolute 09/02/2023 0.02    • Lymphocytes Absolute 09/02/2023 2.71    • Monocytes Absolute 09/02/2023 0.76    • Eosinophils Absolute 09/02/2023 0.20    • Basophils Absolute 09/02/2023 0.12 (H)    • CRP 09/02/2023 4.5 (H)    • Sodium 09/02/2023 139    • Potassium 09/02/2023 3.9    • Chloride 09/02/2023 103    • CO2 09/02/2023 28    • ANION GAP 09/02/2023 8    • BUN 09/02/2023 11    • Creatinine 09/02/2023 0.71    • Glucose, Fasting 09/02/2023 83    • Calcium 09/02/2023 9.3    • AST 09/02/2023 20    • ALT 09/02/2023 26    • Alkaline Phosphatase 09/02/2023 56    • Total Protein 09/02/2023 7.1    • Albumin 09/02/2023 4.1    • Total Bilirubin 09/02/2023 0.42    • eGFR 09/02/2023 99    • Protime 09/02/2023 12.2    • INR 09/02/2023 0.91    • Iron Saturation 09/02/2023 11 (L)    • TIBC 09/02/2023 350    • Iron 09/02/2023 37 (L)    • UIBC 09/02/2023 313    • Ferritin 09/02/2023 15        Lab Results   Component Value Date    WBC 7.71 09/02/2023    HGB 13.5 09/02/2023    HCT 43.2 09/02/2023    MCV 92 09/02/2023     (H) 09/02/2023       Lab Results   Component Value Date     01/16/2015    SODIUM 139 09/02/2023    K 3.9 09/02/2023     09/02/2023    CO2 28 09/02/2023    ANIONGAP 12 01/16/2015    AGAP 8 09/02/2023    BUN 11 09/02/2023    CREATININE 0.71 09/02/2023    GLUC 173 (H) 08/28/2023    GLUF 83 09/02/2023    CALCIUM 9.3 09/02/2023    AST 20 09/02/2023    ALT 26 09/02/2023    ALKPHOS 56 09/02/2023    PROT 7.7 01/16/2015    TP 7.1 09/02/2023    BILITOT 0.7 01/16/2015    TBILI 0.42 09/02/2023    EGFR 99 09/02/2023       Lab Results   Component Value Date    CRP 4.5 (H) 09/02/2023       Lab Results   Component Value Date    UTJ0NKYHKLLK 0.342 (L) 07/21/2023       Lab Results   Component Value Date    IRON 37 (L) 09/02/2023    TIBC 350 09/02/2023    FERRITIN 15 09/02/2023       Radiology Results:   No results found.

## 2023-09-14 NOTE — PROGRESS NOTES
Subjective:     Patient ID: Henok Reynolds is a 46 y.o. female. [facility] Clinical Progress Notes      Specialized Services Documentation  Therapist must complete separate progress note for each specific clinical activity in which the individual participated during the day. Case Management Note    [facility]    Current suicide risk : Unable to assess due to absence. Henok Reynolds was excused from  program today 09/14/23 due to previously scheduled appointment. DC for tomorrow. Unable to updated tx plan. Medications changes/added/denied? No    Treatment session number: N/A    Individual Case Management Visit provided today? No    [facility] follow up physician's orders: None at this time.

## 2023-09-15 ENCOUNTER — DOCUMENTATION (OUTPATIENT)
Dept: PSYCHOLOGY | Facility: CLINIC | Age: 48
End: 2023-09-15

## 2023-09-15 ENCOUNTER — APPOINTMENT (OUTPATIENT)
Dept: PSYCHOLOGY | Facility: CLINIC | Age: 48
End: 2023-09-15
Payer: COMMERCIAL

## 2023-09-15 PROCEDURE — 90834 PSYTX W PT 45 MINUTES: CPT

## 2023-09-15 PROCEDURE — S0201 PARTIAL HOSPITALIZATION SERV: HCPCS

## 2023-09-15 NOTE — BH CRISIS PLAN
Client Name: Julien Reyes       Client YOB: 1975  : 1975    Treatment Team (include name and contact information):     Psychotherapist:   Evie Palm  [address] [facility]. [place], [address] [place] [address]  [phone]    Psychiatrist:   Salud Hoover  [facility]  [address], [address] [facility]  [phone](G)  [phone](D)     Release of information completed: yes    Healthcare Provider  Jennie Ortiz DO   [facility] [address]  [phone]    Type of Plan   * Child plans (children 15 yo and younger) must be completed and signed by the child's legal guardian   * Plans for all individuals 15 yo and above must be signed by the client. Plan Type: adolescent/adult (14 and over) Initial      My Personal Strengths are (in the client's own words):  "good communicator, loyal, good morals"    The stressors and triggers that may put me at risk are:  family conflict, health issues and occupational problems - wants to work but needs a safe environment to work in    Coping skills I can use to keep myself calm and safe: Other (describe) Emotional freedom tapping, music, meditation, breathing     Coping skills/supports I can use to maintain abstinence from substance use:   Not Applicable    The people that provide me with help and support: (Include name, contact, and how they can help)   Support person #1: Tonio Stokes    * Phone number: [phone]    * How can they help me? Good listener, sees things I don't always see, know who to call for help   Support person #2:Chris Latham    * Phone number: [phone]    * How can they help me? Working in [employer], she is a good listener and can bring me up     Support person #3:      * Phone number: [phone](PUHHGAAN) [phone](XNYDPV)    * How can they help me? Wonderful support who reminds me of how strong I am . He helps me deal with the Crohns.      In the past, the following has helped me in times of crisis:    Other: turning to my friends and doctors and recognizing I can not do it on my own and need help      If it is an emergency and you need immediate help, call     If there is a possibility of danger to yourself or others, call the following crisis hotline resources:     Adult Crisis Numbers  Suicide Prevention Hotline - Dial   [facility]: [address]: [address]: [address]: [phone]  [facility]: [address]: [address]: [address]: [phone] (daytime). [phone] (after hours, weekends, holidays)     Child/Adolescent Crisis Numbers   [facility]: [address]: [phone]   [facility]: [phone]   [facility]: [phone]    Please note: Some [facility] do not have a separate number for Child/Adolescent specific crisis. If your county is not listed under Child/Adolescent, please call the adult number for your county     National Talk to Text Line   All Ages - [phone]    In the event your feelings become unmanageable, and you cannot reach your support system, you will call 911 immediately or go to the nearest hospital emergency room.

## 2023-09-15 NOTE — PROGRESS NOTES
Assessment/Plan:       Diagnoses and all orders for this visit:     Generalized anxiety disorder     Recurrent major depressive disorder, in partial remission (HCC)            Subjective:      Patient ID: Celine Arrington Stage a 46 y.o. female.     [facility] Treatment Plan   AREAS OF NEED: Generalized anxiety disorder and recurrent major depressive disorder in partial remission as evidenced by nervousness, restlessness, difficulties with concentration , can visualize things but can not physically do them, racing and ruminating thoughts, difficulties falling and staying asleep, crying due to health issues and relationship strain with mother.      Date Initiated: 08/18/23     Strengths: "connect with people, stubborn, kind, determined, organized, help people, well prepared, good in crisis mode, good under pressure"     LONG TERM GOAL:   Date Initiated: 08/18/23  1.0 I will identify and share three ways in which my day-to-day functioning has improved since attending program.  Target Date: 9/15/23  Completion Date: absent - Discharged on 9/15/23        SHORT TERM OBJECTIVES:      Date Initiated: 08/18/23  1.1 I will increase limit setting and boundaries by implementing assertiveness skills into my weekly routine in order to meet my own needs and build confidence. Revision Date:  8/29/23-absent; 8/30/23;unable to update due to absence from 9/7/23 to 9/15/25; Discharged on 9/15/23    Target Date: 8/29/23  Completion Date: Absent Discharged 9/15/23     Date Initiated: 08/18/23  1.2 I will create a list identifying coping skills that I have learned and discuss how I plan to build them into my schedule. I will choose at least 1 different coping skill daily to practice   Revision Date: 8/29/23-absent; 8/30/23;unable to update due to absence from 9/7/23 to 9/15/25;  Discharged on 9/15/23    Target Date: 8/29/23  Completion Date:Absent Discharged 9/15/23     Date Initiated: 08/18/23  1.3 I will take medications as prescribed and share questions and concerns if arise.    Revision Date:8/29/23-absent; 8/30/23;unable to update due to absence from 9/7/23 to 9/15/25; Discharged on 9/15/23    Target Date: 8/29/23  Completion Date: Absent Discharged 9/15/23     Date Initiated: 08/18/23  1.4 I will identify 3 ways my supports can assist in my recovery and agree to staff/support contact as indicated.    Revision Date:8/29/23-absent; 8/30/23;unable to update due to absence from 9/7/23 to 9/15/25; Discharged on 9/15/23    Target Date: 8/29/23  Completion Date:Absent Discharged 9/15/23            7 DAY REVISION:   1.5 I will learn about cognitive re-framing and identify cognitive coping mechanisms I have learned and begin to implement them into my weekly routine. Date Initiated: 8/30/23  Revision Date: unable to update due to absence from 9/7/23 to 9/15/25; Discharged on 9/15/23    Target Date: 9/11/23  Completion Date: Absent Discharged 9/15/23        PSYCHIATRY:  Date Initiated:  08/18/23  Medication Management and Education       Revision Date: :8/29/23-absent; 08/30/23 ;unable to update due to absence from 9/7/23 to 9/15/25; Discharged on 9/15/23       1.3 Continue medication management         The person(s) responsible for carrying out the plan is Diogenes Lewis DO and Lisha Orellana PA-C     NURSING/SYMPTOM EDUCATION:   Date Initiated: 08/18/23  1.1, 1.2. 1.3, 1.4 This RN/Or Therapist will provide wellness/symptoms and skill education groups three to five days weekly to [facility] Book signs and symptoms of diagnoses, skills to manage, and medication questions that will be addressed by the treatment team.    Revision date::8/29/23-absent; 08/30/23 ;unable to update due to absence from 9/7/23 to 9/15/25;  Discharged on 9/15/23  1.1,1.2,1.3,1.4,1.5 Continue to encourage Loretta Ross to participate in wellness/symptoms and skills education groups daily to learn about symptoms, coping strategies and warning signs to promote relapse prevention.      The person(s) responsible for carrying out the plan is EMMA Betancourt, LSW ; Irina Chacon RN, BSN     PSYCHOLOGY:   Date Initiated: 08/18/23       1.1, 1.2, 1.4 Provide psychotherapy group 5 times per week to allow opportunity for Ezekiel Cummings explore stressors and ways of coping. Revision Date: :8/29/23-absent; 08/30/23 ;unable to update due to absence from 9/7/23 to 9/15/25; Discharged on 9/15/23  1.1,1.2,1.4,1.5  Continue to provide psychotherapy group daily to Susana John and encourage sharing of stressors, skills and positive change.     The person(s) responsible for carrying out the plan is EMMA Betancourt,PAULINEW; PABLO Martinez [facility] ADOLESCENT TREATMENT [facility]     ALLIED THERAPY:   Date Initiated: 08/18/23  1.1,1.2 Erma Drew AT group 5 times weekly to encourage development and use of wellness tools to decrease symptoms and promote recovery through meaningful activity. Revision Date: :8/29/23-absent; 08/30/23 ;unable to update due to absence from 9/7/23 to 9/15/25; Discharged on 9/15/23  1.1,1.2,1.5 Continue to engage Susana John to participate in AT group to practice wellness tools within program and transfer to home sharing successes and barriers through healthy task involvement.         The person(s) responsible for carrying out the plan is Lennie Bansal [facility] ; Rogerio Cr [facility]     CASE MANAGEMENT:   Date Initiated: 08/18/23      1.0 This  will meet with Fritz Zamora times weekly to assess treatment progress, discharge planning, connection to community supports and UR as indicated. Revision Date: :8/29/23-absent; 08/30/23;unable to update due to absence from 9/7/23 to 9/15/25;  Discharged on 9/15/23   1.0 Continue to meet with Susana John 3-4 times weekly to assess growth, work toward goals, continued treatment needs, dc planning and use of supports.     The person(s) responsible for carrying out the plan is Mariia ELIZABETH REVIEW/COMMENTS:      DISCHARGE CRITERIA: Identify 3 signs of progress and complete a crisis plan.    DISCHARGE PLAN: Connect with identified outpatient providers.    Estimated Length of Stay: 10 treatment days                Diagnosis and Treatment Plan explained to Celine, Celine relates understanding diagnosis and is agreeable to Treatment Plan.         CLIENT COMMENTS / Please share your thoughts, feelings, need and/or experiences regarding your treatment plan with Staff. Mila Quiros see follow up note with comments.     Signatures can be found on [facility] Treatment plan consent form.

## 2023-09-15 NOTE — PROGRESS NOTES
Subjective:     Patient ID: Lorenza Dang is a 46 y.o. female. [facility] Clinical Progress Notes      Specialized Services Documentation  Therapist must complete separate progress note for each specific clinical activity in which the individual participated during the day. Case Management Note    Katharina Garay, MSW, LSW    Current suicide risk : Low- Absent from program -  reviewed discharge with Stalin Barraza via Teams call    ([phone]) CM reviewed Crisis Plan, discharge instructions, medication list and crisis information with Lorenza Dang. See discharge summary for treatment details. Aftercare providers to receive discharge summary. Crisis plan and PHQ-9 completed today 9/15/23 but was unable to be entered into todays encounter. 9/6/23 encounter was addended to add crisis plan and PHQ-9. I,Celine Corona physically unable to provide a signature; however, I understand the nature of and am in agreement with the documentation presented to me via TEAMS. I have received a copy through My Chart and/or the [facility]. I freely give verbal consent. Name of Document (s):Relapse Prevention Plan, discharge instructions, medication list   Witness to verbal consent: Katharina Garay  Witness to verbal consent: Billy Zaragoza    Medications changes/added/denied? NA    Treatment session number:NA    Individual Case Management Visit provided today?  Yes     [facility] follow up physician's orders: Discharge - See Dr. Paige Friedman Note

## 2023-09-15 NOTE — BH CRISIS PLAN
Client Name: Angi Rider       Client YOB: 1975  : 1975    Treatment Team (include name and contact information):     Psychotherapist:    Shimon Martinez   [address] [facility]. [facility], [address] [place] [address]   [phone]    Psychiatrist:    Jazmin Fleming   [facility], [address] [facility]   [phone](H)   [phone](M)   Release of information completed: yes    Healthcare Provider  Kathrine Restrepo DO  [address] [facility] [address] [facility] [address]  [phone]    Type of Plan   * Child plans (children 15 yo and younger) must be completed and signed by the child's legal guardian   * Plans for all individuals 15 yo and above must be signed by the client.      Plan Type: adolescent/adult (14 and over) Initial      My Personal Strengths are (in the client's own words):  "***"  The stressors and triggers that may put me at risk are:  {AMB PSYCH/BH Triggers/Stressors:99772}    Coping skills I can use to keep myself calm and safe:  {AMB PSYCH/BH COPING SKILLS:88069}    Coping skills/supports I can use to maintain abstinence from substance use:   {Substance Copin}    The people that provide me with help and support: (Include name, contact, and how they can help)   Support person #1: ***    * Phone number: {Support Person Phone:[phone]}    * How can they help me? ***   Support person #2:***    * Phone number: {Support Person Phone:49365}    * How can they help me? ***     Support person #3: ***    * Phone number: {Support Person Phone:21439}    * How can they help me? ***    In the past, the following has helped me in times of crisis:    {AMB PSYCH/BH Things that help:19997}      If it is an emergency and you need immediate help, call     If there is a possibility of danger to yourself or others, call the following crisis hotline resources:     Adult Crisis Numbers  Suicide Prevention Hotline - Dial   [facility]: [address]: [address]: [address]: [phone]  [address] [place] [address] Street: [phone]  [facility]: [address]: [address]: [phone] (daytime). [phone] (after hours, weekends, holidays)     Child/Adolescent Crisis Numbers   [facility] WOMEN'S AND CHILDREN'S [facility] of hospitals in the [place]: [address]: [phone]   Danny Campos: [phone]   [address] [place] [address] Street: [phone]    Please note: Some [facility] do not have a separate number for Child/Adolescent specific crisis. If your county is not listed under Child/Adolescent, please call the adult number for your county     National Talk to Text Line   [facility] [facility] - [phone]    In the event your feelings become unmanageable, and you cannot reach your support system, you will call 911 immediately or go to the nearest hospital emergency room.

## 2023-09-15 NOTE — PSYCH
Assessment/Plan:       Diagnoses and all orders for this visit:     Generalized anxiety disorder     Recurrent major depressive disorder, in partial remission ([facility])            Subjective:      Patient ID: Celine Tang a 46 y.o. female.     [facility] Treatment Plan   AREAS OF NEED: Generalized anxiety disorder and recurrent major depressive disorder in partial remission as evidenced by nervousness, restlessness, difficulties with concentration , can visualize things but can not physically do them, racing and ruminating thoughts, difficulties falling and staying asleep, crying due to health issues and relationship strain with mother.      Date Initiated: 08/18/23     Strengths: "connect with people, stubborn, kind, determined, organized, help people, well prepared, good in crisis mode, good under pressure"     LONG TERM GOAL:   Date Initiated: 08/18/23  1.0 I will identify and share three ways in which my day-to-day functioning has improved since attending program.  Target Date: 9/15/23  Completion Date:         SHORT TERM OBJECTIVES:      Date Initiated: 08/18/23  1.1 I will increase limit setting and boundaries by implementing assertiveness skills into my weekly routine in order to meet my own needs and build confidence.   Revision Date:  8/29/23-absent; 8/30/23  Target Date: 8/29/23  Completion Date:      Date Initiated: 08/18/23  1.2 I will create a list identifying coping skills that I have learned and discuss how I plan to build them into my schedule. I will choose at least 1 different coping skill daily to practice   Revision Date: 8/29/23-absent; 8/30/23  Target Date: 8/29/23  Completion Date:     Date Initiated: 08/18/23  1.3 I will take medications as prescribed and share questions and concerns if arise.    Revision Date:8/29/23-absent; 8/30/23  Target Date: 8/29/23  Completion Date:      Date Initiated: 08/18/23  1.4 I will identify 3 ways my supports can assist in my recovery and agree to staff/support contact as indicated.    Revision Date:8/29/23-absent; 8/30/23  Target Date: 8/29/23  Completion Date:            7 DAY REVISION:   1.5 I will learn about cognitive re-framing and identify cognitive coping mechanisms I have learned and begin to implement them into my weekly routine. Date Initiated: 8/30/23  Revision Date:   Target Date: 9/11/23  Completion Date:        PSYCHIATRY:  Date Initiated:  08/18/23  Medication Management and Education       Revision Date: :8/29/23-absent; 08/30/23        1.3 Continue medication management         The person(s) responsible for carrying out the plan is Diogenes Lewis DO and Flavio Rocha PA-C     NURSING/SYMPTOM EDUCATION:   Date Initiated: 08/18/23  1.1, 1.2. 1.3, 1.4 This RN/Or Therapist will provide wellness/symptoms and skill education groups three to five days weekly to [facility] Standing signs and symptoms of diagnoses, skills to manage, and medication questions that will be addressed by the treatment team.    Revision date::8/29/23-absent; 08/30/23   1.1,1.2,1.3,1.4,1.5 Continue to encourage Henok Reynolds to participate in wellness/symptoms and skills education groups daily to learn about symptoms, coping strategies and warning signs to promote relapse prevention.      The person(s) responsible for carrying out the plan is EMMA Myers, LSW ; Yessica Charles RN, BSN     PSYCHOLOGY:   Date Initiated: 08/18/23       1.1, 1.2, 1.4 Provide psychotherapy group 5 times per week to allow opportunity for Tre Barnes explore stressors and ways of coping.    Revision Date: :8/29/23-absent; 08/30/23   1.1,1.2,1.4,1.5  Continue to provide psychotherapy group daily to Henok Reynolds and encourage sharing of stressors, skills and positive change.     The person(s) responsible for carrying out the plan is EMMA Myers,LSW; Stephie Garcia     ALLIED THERAPY:   Date Initiated: 08/18/23  1.1,1.2 Engage Celine Roa in AT group 5 times weekly to encourage development and use of wellness tools to decrease symptoms and promote recovery through meaningful activity. Revision Date: :8/29/23-absent; 08/30/23   1.1,1.2,1.5 Continue to engage Erik Schuster to participate in AT group to practice wellness tools within program and transfer to home sharing successes and barriers through healthy task involvement.         The person(s) responsible for carrying out the plan is Vipin Lee [facility] ; Devora Oconnor [facility], [facility]     CASE MANAGEMENT:   Date Initiated: 08/18/23      1.0 This  will meet with Aayush Reid times weekly to assess treatment progress, discharge planning, connection to community supports and UR as indicated. Revision Date: :8/29/23-absent; 08/30/23   1.0 Continue to meet with Erik Schuster 3-4 times weekly to assess growth, work toward goals, continued treatment needs, dc planning and use of supports.     The person(s) responsible for carrying out the plan is Stone Mancilla     TREATMENT REVIEW/COMMENTS:      DISCHARGE CRITERIA: Identify 3 signs of progress and complete a crisis plan.    DISCHARGE PLAN: Connect with identified outpatient providers.    Estimated Length of Stay: 10 treatment days                Diagnosis and Treatment Plan explained to Celine, Celine relates understanding diagnosis and is agreeable to Treatment Plan.         CLIENT COMMENTS / Please share your thoughts, feelings, need and/or experiences regarding your treatment plan with Staff. Christiano Caballero see follow up note with comments.     Signatures can be found on [facility] Treatment plan consent form.

## 2023-09-19 ENCOUNTER — HOSPITAL ENCOUNTER (OUTPATIENT)
Dept: INFUSION CENTER | Facility: HOSPITAL | Age: 48
Discharge: HOME | End: 2023-09-19
Payer: COMMERCIAL

## 2023-09-19 ENCOUNTER — DOCUMENTATION (OUTPATIENT)
Dept: PSYCHOLOGY | Facility: CLINIC | Age: 48
End: 2023-09-19

## 2023-09-19 VITALS
DIASTOLIC BLOOD PRESSURE: 86 MMHG | SYSTOLIC BLOOD PRESSURE: 123 MMHG | HEART RATE: 99 BPM | TEMPERATURE: 97.7 F | RESPIRATION RATE: 16 BRPM

## 2023-09-19 DIAGNOSIS — F41.1 GENERALIZED ANXIETY DISORDER: Primary | Chronic | ICD-10-CM

## 2023-09-19 DIAGNOSIS — R63.0 ANOREXIA: Primary | ICD-10-CM

## 2023-09-19 DIAGNOSIS — R11.10 VOMITING AND DIARRHEA: ICD-10-CM

## 2023-09-19 DIAGNOSIS — R19.7 VOMITING AND DIARRHEA: ICD-10-CM

## 2023-09-19 DIAGNOSIS — F33.41 RECURRENT MAJOR DEPRESSIVE DISORDER, IN PARTIAL REMISSION (HCC): ICD-10-CM

## 2023-09-19 DIAGNOSIS — E44.1 MILD PROTEIN-CALORIE MALNUTRITION (HCC): ICD-10-CM

## 2023-09-19 PROCEDURE — 96367 TX/PROPH/DG ADDL SEQ IV INF: CPT

## 2023-09-19 PROCEDURE — 96365 THER/PROPH/DIAG IV INF INIT: CPT

## 2023-09-19 PROCEDURE — 96375 TX/PRO/DX INJ NEW DRUG ADDON: CPT

## 2023-09-19 RX ORDER — METHYLPREDNISOLONE SODIUM SUCCINATE 40 MG/ML
40 INJECTION, POWDER, LYOPHILIZED, FOR SOLUTION INTRAMUSCULAR; INTRAVENOUS ONCE
Status: CANCELLED
Start: 2023-09-29 | End: 2023-09-20

## 2023-09-19 RX ORDER — METHYLPREDNISOLONE SODIUM SUCCINATE 40 MG/ML
40 INJECTION, POWDER, LYOPHILIZED, FOR SOLUTION INTRAMUSCULAR; INTRAVENOUS ONCE
Status: COMPLETED | OUTPATIENT
Start: 2023-09-19 | End: 2023-09-19

## 2023-09-19 RX ORDER — SODIUM CHLORIDE 9 MG/ML
20 INJECTION, SOLUTION INTRAVENOUS CONTINUOUS
Status: DISCONTINUED | OUTPATIENT
Start: 2023-09-19 | End: 2023-09-22 | Stop reason: HOSPADM

## 2023-09-19 RX ADMIN — SODIUM CHLORIDE 20 ML/HR: 0.9 INJECTION, SOLUTION INTRAVENOUS at 14:15

## 2023-09-19 RX ADMIN — METHYLPREDNISOLONE SODIUM SUCCINATE 40 MG: 40 INJECTION, POWDER, FOR SOLUTION INTRAMUSCULAR; INTRAVENOUS at 15:10

## 2023-09-19 RX ADMIN — DIPHENHYDRAMINE HYDROCHLORIDE 50 MG: 50 INJECTION, SOLUTION INTRAMUSCULAR; INTRAVENOUS at 14:25

## 2023-09-19 RX ADMIN — IRON SUCROSE 200 MG: 20 INJECTION, SOLUTION INTRAVENOUS at 15:22
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
## 2023-09-20 ENCOUNTER — HOSPITAL ENCOUNTER (OUTPATIENT)
Dept: GASTROENTEROLOGY | Facility: HOSPITAL | Age: 48
Setting detail: OUTPATIENT SURGERY
Discharge: HOME | End: 2023-09-20
Attending: INTERNAL MEDICINE | Admitting: INTERNAL MEDICINE
Payer: COMMERCIAL

## 2023-09-20 ENCOUNTER — ANESTHESIA EVENT (OUTPATIENT)
Dept: GASTROENTEROLOGY | Facility: HOSPITAL | Age: 48
End: 2023-09-20

## 2023-09-20 ENCOUNTER — ANESTHESIA (OUTPATIENT)
Dept: GASTROENTEROLOGY | Facility: HOSPITAL | Age: 48
End: 2023-09-20

## 2023-09-20 VITALS
RESPIRATION RATE: 18 BRPM | DIASTOLIC BLOOD PRESSURE: 75 MMHG | WEIGHT: 154 LBS | OXYGEN SATURATION: 96 % | HEART RATE: 75 BPM | BODY MASS INDEX: 25.39 KG/M2 | TEMPERATURE: 97.1 F | SYSTOLIC BLOOD PRESSURE: 122 MMHG

## 2023-09-20 DIAGNOSIS — E44.1 MILD PROTEIN-CALORIE MALNUTRITION (HCC): ICD-10-CM

## 2023-09-20 DIAGNOSIS — K50.119 CROHN'S DISEASE OF COLON WITH COMPLICATION (HCC): ICD-10-CM

## 2023-09-20 DIAGNOSIS — R11.0 NAUSEA: ICD-10-CM

## 2023-09-20 DIAGNOSIS — K92.1 BLACK STOOL: ICD-10-CM

## 2023-09-20 DIAGNOSIS — D84.9 IMMUNOSUPPRESSED STATUS (HCC): ICD-10-CM

## 2023-09-20 PROCEDURE — 88305 TISSUE EXAM BY PATHOLOGIST: CPT | Performed by: PATHOLOGY

## 2023-09-20 PROCEDURE — 88342 IMHCHEM/IMCYTCHM 1ST ANTB: CPT | Performed by: PATHOLOGY

## 2023-09-20 RX ORDER — METHYLPREDNISOLONE SODIUM SUCCINATE 40 MG/ML
40 INJECTION, POWDER, LYOPHILIZED, FOR SOLUTION INTRAMUSCULAR; INTRAVENOUS ONCE
Status: COMPLETED | OUTPATIENT
Start: 2023-09-20 | End: 2023-09-20

## 2023-09-20 RX ORDER — UBIDECARENONE 75 MG
CAPSULE ORAL DAILY
COMMUNITY

## 2023-09-20 RX ORDER — SODIUM CHLORIDE 9 MG/ML
INJECTION, SOLUTION INTRAVENOUS CONTINUOUS PRN
Status: DISCONTINUED | OUTPATIENT
Start: 2023-09-20 | End: 2023-09-20

## 2023-09-20 RX ORDER — DIPHENHYDRAMINE HYDROCHLORIDE 50 MG/ML
50 INJECTION INTRAMUSCULAR; INTRAVENOUS ONCE
Status: COMPLETED | OUTPATIENT
Start: 2023-09-20 | End: 2023-09-20

## 2023-09-20 RX ORDER — LIDOCAINE HYDROCHLORIDE 10 MG/ML
INJECTION, SOLUTION EPIDURAL; INFILTRATION; INTRACAUDAL; PERINEURAL AS NEEDED
Status: DISCONTINUED | OUTPATIENT
Start: 2023-09-20 | End: 2023-09-20

## 2023-09-20 RX ORDER — FAMOTIDINE 10 MG/ML
20 INJECTION, SOLUTION INTRAVENOUS ONCE
Status: COMPLETED | OUTPATIENT
Start: 2023-09-20 | End: 2023-09-20

## 2023-09-20 RX ORDER — PROPOFOL 10 MG/ML
INJECTION, EMULSION INTRAVENOUS AS NEEDED
Status: DISCONTINUED | OUTPATIENT
Start: 2023-09-20 | End: 2023-09-20

## 2023-09-20 RX ORDER — DIPHENHYDRAMINE HYDROCHLORIDE 50 MG/ML
25 INJECTION INTRAMUSCULAR; INTRAVENOUS EVERY 6 HOURS PRN
OUTPATIENT
Start: 2023-09-20

## 2023-09-20 RX ADMIN — DIPHENHYDRAMINE HYDROCHLORIDE 50 MG: 50 INJECTION, SOLUTION INTRAMUSCULAR; INTRAVENOUS at 07:56

## 2023-09-20 RX ADMIN — LIDOCAINE HYDROCHLORIDE 50 MG: 10 INJECTION, SOLUTION EPIDURAL; INFILTRATION; INTRACAUDAL; PERINEURAL at 08:33

## 2023-09-20 RX ADMIN — PROPOFOL 20 MG: 10 INJECTION, EMULSION INTRAVENOUS at 08:41

## 2023-09-20 RX ADMIN — PROPOFOL 30 MG: 10 INJECTION, EMULSION INTRAVENOUS at 08:35

## 2023-09-20 RX ADMIN — METHYLPREDNISOLONE SODIUM SUCCINATE 40 MG: 40 INJECTION, POWDER, FOR SOLUTION INTRAMUSCULAR; INTRAVENOUS at 07:48

## 2023-09-20 RX ADMIN — SODIUM CHLORIDE: 0.9 INJECTION, SOLUTION INTRAVENOUS at 08:24

## 2023-09-20 RX ADMIN — FAMOTIDINE 20 MG: 10 INJECTION INTRAVENOUS at 07:50

## 2023-09-20 RX ADMIN — PROPOFOL 30 MG: 10 INJECTION, EMULSION INTRAVENOUS at 08:36

## 2023-09-20 RX ADMIN — PROPOFOL 30 MG: 10 INJECTION, EMULSION INTRAVENOUS at 08:37

## 2023-09-20 RX ADMIN — PROPOFOL 70 MG: 10 INJECTION, EMULSION INTRAVENOUS at 08:33

## 2023-09-20 NOTE — ANESTHESIA PREPROCEDURE EVALUATION
Procedure:  FLEXIBLE SIGMOIDOSCOPY  EGD    Mast cell activations syndrome, got her cocktail of steroids, benadryl this am    Relevant Problems   CARDIO   (+) Atrial tachycardia (HCC)   (+) CHF (congestive heart failure) (HCC)   (+) Migraine      ENDO   (+) Acquired hypothyroidism      GI/HEPATIC   (+) GERD without esophagitis      /RENAL   (+) Nephrolithiasis      MUSCULOSKELETAL   (+) Chondromalacia   (+) Chronic back pain   (+) Fibromyalgia   (+) Primary localized osteoarthrosis of ankle and foot      NEURO/PSYCH   (+) Chronic back pain   (+) Fibromyalgia   (+) Generalized anxiety disorder   (+) Headache   (+) Major depressive disorder with psychotic features (HCC)   (+) Migraine   (+) Recurrent major depressive disorder, in partial remission (HCC)   (+) Seizure (HCC)      Other   (+) 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HCC)        Physical Exam    Airway    Mallampati score: II  TM Distance: >3 FB  Neck ROM: full     Dental   No notable dental hx     Cardiovascular  Rhythm: regular, Rate: normal,     Pulmonary  Breath sounds clear to auscultation,     Other Findings        Anesthesia Plan  ASA Score- 2     Anesthesia Type- IV sedation with anesthesia with ASA Monitors. Additional Monitors:   Airway Plan:           Plan Factors-    Chart reviewed. Patient summary reviewed. Patient is not a current smoker. Induction- intravenous. Postoperative Plan-     Informed Consent- Anesthetic plan and risks discussed with patient. I personally reviewed this patient with the CRNA. Discussed and agreed on the Anesthesia Plan with the CRNA. Dandy Martinez

## 2023-09-20 NOTE — INTERVAL H&P NOTE
H&P reviewed. After examining the patient I find no changes in the patients condition since the H&P had been written.     Vitals:    09/20/23 0726   BP: 151/93   Pulse: 71   Resp: 20   Temp: (!) 96.9 °F (36.1 °C)   SpO2: 99%

## 2023-09-20 NOTE — ANESTHESIA POSTPROCEDURE EVALUATION
Post-Op Assessment Note    CV Status:  Stable  Pain Score: 0    Pain management: adequate     Mental Status:  Alert and awake   Hydration Status:  Euvolemic   PONV Controlled:  Controlled   Airway Patency:  Patent      Post Op Vitals Reviewed: Yes      Staff: CRNA         No notable events documented.     /67 (09/20/23 0849)    Temp (!) 97.1 °F (36.2 °C) (09/20/23 0849)    Pulse 69 (09/20/23 0849)   Resp 18 (09/20/23 0849)    SpO2 99 % (09/20/23 0849)

## 2023-09-21 ENCOUNTER — TELEPHONE (OUTPATIENT)
Dept: PSYCHIATRY | Facility: CLINIC | Age: 48
End: 2023-09-21

## 2023-09-21 ENCOUNTER — HOSPITAL ENCOUNTER (OUTPATIENT)
Dept: INFUSION CENTER | Facility: HOSPITAL | Age: 48
Discharge: HOME | End: 2023-09-21
Attending: INTERNAL MEDICINE

## 2023-09-21 NOTE — TELEPHONE ENCOUNTER
Patient cancelled NP appointment for 9/21/23 at 3:00p with provider [place]. Writer cancelled the patient's NP follow up for 10/12/23 at 3:00p. Please reach out to the patient to assist with rescheduling her NP appointment.

## 2023-09-24 PROCEDURE — 88305 TISSUE EXAM BY PATHOLOGIST: CPT | Performed by: PATHOLOGY

## 2023-09-24 PROCEDURE — 88342 IMHCHEM/IMCYTCHM 1ST ANTB: CPT | Performed by: PATHOLOGY

## 2023-09-27 NOTE — TELEPHONE ENCOUNTER
Returning patient's call in attempts to r/s appt. LVM for patient to contact intake dept in regards to upcoming appointment with Dr. Flores Cage and answer any questions.

## 2023-09-29 ENCOUNTER — HOSPITAL ENCOUNTER (OUTPATIENT)
Dept: INFUSION CENTER | Facility: HOSPITAL | Age: 48
End: 2023-09-29
Payer: COMMERCIAL

## 2023-09-29 VITALS
TEMPERATURE: 97.8 F | SYSTOLIC BLOOD PRESSURE: 125 MMHG | HEART RATE: 76 BPM | DIASTOLIC BLOOD PRESSURE: 72 MMHG | RESPIRATION RATE: 18 BRPM

## 2023-09-29 DIAGNOSIS — R11.10 VOMITING AND DIARRHEA: ICD-10-CM

## 2023-09-29 DIAGNOSIS — E44.1 MILD PROTEIN-CALORIE MALNUTRITION (HCC): ICD-10-CM

## 2023-09-29 DIAGNOSIS — R63.0 ANOREXIA: Primary | ICD-10-CM

## 2023-09-29 DIAGNOSIS — R19.7 VOMITING AND DIARRHEA: ICD-10-CM

## 2023-09-29 PROCEDURE — 96367 TX/PROPH/DG ADDL SEQ IV INF: CPT

## 2023-09-29 PROCEDURE — 96365 THER/PROPH/DIAG IV INF INIT: CPT

## 2023-09-29 PROCEDURE — 96361 HYDRATE IV INFUSION ADD-ON: CPT

## 2023-09-29 PROCEDURE — 96375 TX/PRO/DX INJ NEW DRUG ADDON: CPT

## 2023-09-29 RX ORDER — METHYLPREDNISOLONE SODIUM SUCCINATE 40 MG/ML
40 INJECTION, POWDER, LYOPHILIZED, FOR SOLUTION INTRAMUSCULAR; INTRAVENOUS ONCE
Status: COMPLETED | OUTPATIENT
Start: 2023-09-29 | End: 2023-09-29

## 2023-09-29 RX ORDER — METHYLPREDNISOLONE SODIUM SUCCINATE 40 MG/ML
40 INJECTION, POWDER, LYOPHILIZED, FOR SOLUTION INTRAMUSCULAR; INTRAVENOUS ONCE
Status: CANCELLED
Start: 2023-10-04 | End: 2023-09-30

## 2023-09-29 RX ADMIN — SODIUM CHLORIDE 500 ML: 0.9 INJECTION, SOLUTION INTRAVENOUS at 13:30

## 2023-09-29 RX ADMIN — DIPHENHYDRAMINE HYDROCHLORIDE 50 MG: 50 INJECTION, SOLUTION INTRAMUSCULAR; INTRAVENOUS at 14:38

## 2023-09-29 RX ADMIN — SODIUM CHLORIDE 200 MG: 9 INJECTION, SOLUTION INTRAVENOUS at 15:25

## 2023-09-29 RX ADMIN — METHYLPREDNISOLONE SODIUM SUCCINATE 40 MG: 40 INJECTION, POWDER, FOR SOLUTION INTRAMUSCULAR; INTRAVENOUS at 15:18

## 2023-10-02 ENCOUNTER — OFFICE VISIT (OUTPATIENT)
Dept: PSYCHIATRY | Facility: CLINIC | Age: 48
End: 2023-10-02
Payer: COMMERCIAL

## 2023-10-02 DIAGNOSIS — F33.41 RECURRENT MAJOR DEPRESSIVE DISORDER, IN PARTIAL REMISSION (HCC): Primary | ICD-10-CM

## 2023-10-02 DIAGNOSIS — F41.1 GENERALIZED ANXIETY DISORDER: ICD-10-CM

## 2023-10-02 PROCEDURE — 99214 OFFICE O/P EST MOD 30 MIN: CPT | Performed by: STUDENT IN AN ORGANIZED HEALTH CARE EDUCATION/TRAINING PROGRAM

## 2023-10-02 PROCEDURE — 90833 PSYTX W PT W E/M 30 MIN: CPT | Performed by: STUDENT IN AN ORGANIZED HEALTH CARE EDUCATION/TRAINING PROGRAM

## 2023-10-02 RX ORDER — ARIPIPRAZOLE 2 MG/1
2 TABLET ORAL DAILY
Qty: 7 TABLET | Refills: 0 | Status: SHIPPED | OUTPATIENT
Start: 2023-10-02

## 2023-10-02 RX ORDER — SERTRALINE HYDROCHLORIDE 25 MG/1
25 TABLET, FILM COATED ORAL DAILY
Qty: 15 TABLET | Refills: 0 | Status: SHIPPED | OUTPATIENT
Start: 2023-10-02

## 2023-10-03 ENCOUNTER — DOCUMENTATION (OUTPATIENT)
Dept: PSYCHIATRY | Facility: CLINIC | Age: 48
End: 2023-10-03

## 2023-10-03 DIAGNOSIS — F32.3 MAJOR DEPRESSIVE DISORDER WITH PSYCHOTIC FEATURES (HCC): ICD-10-CM

## 2023-10-03 RX ORDER — TRAZODONE HYDROCHLORIDE 50 MG/1
50 TABLET ORAL
Qty: 30 TABLET | Refills: 0 | Status: SHIPPED | OUTPATIENT
Start: 2023-10-03

## 2023-10-04 ENCOUNTER — HOSPITAL ENCOUNTER (OUTPATIENT)
Dept: INFUSION CENTER | Facility: HOSPITAL | Age: 48
Discharge: HOME | End: 2023-10-04
Attending: INTERNAL MEDICINE
Payer: COMMERCIAL

## 2023-10-04 VITALS
HEART RATE: 94 BPM | TEMPERATURE: 97.6 F | DIASTOLIC BLOOD PRESSURE: 71 MMHG | RESPIRATION RATE: 17 BRPM | SYSTOLIC BLOOD PRESSURE: 122 MMHG | OXYGEN SATURATION: 96 %

## 2023-10-04 DIAGNOSIS — R11.10 VOMITING AND DIARRHEA: ICD-10-CM

## 2023-10-04 DIAGNOSIS — E44.1 MILD PROTEIN-CALORIE MALNUTRITION (HCC): ICD-10-CM

## 2023-10-04 DIAGNOSIS — R19.7 VOMITING AND DIARRHEA: ICD-10-CM

## 2023-10-04 DIAGNOSIS — L50.1 CHRONIC IDIOPATHIC URTICARIA: ICD-10-CM

## 2023-10-04 DIAGNOSIS — R63.0 ANOREXIA: Primary | ICD-10-CM

## 2023-10-04 PROCEDURE — 96367 TX/PROPH/DG ADDL SEQ IV INF: CPT

## 2023-10-04 PROCEDURE — 96372 THER/PROPH/DIAG INJ SC/IM: CPT

## 2023-10-04 PROCEDURE — 96365 THER/PROPH/DIAG IV INF INIT: CPT

## 2023-10-04 PROCEDURE — 96375 TX/PRO/DX INJ NEW DRUG ADDON: CPT

## 2023-10-04 RX ORDER — EPINEPHRINE 1 MG/ML
0.3 INJECTION, SOLUTION, CONCENTRATE INTRAVENOUS
Status: DISCONTINUED | OUTPATIENT
Start: 2023-10-04 | End: 2023-10-07 | Stop reason: HOSPADM

## 2023-10-04 RX ORDER — EPINEPHRINE 1 MG/ML
0.3 INJECTION, SOLUTION, CONCENTRATE INTRAVENOUS
OUTPATIENT
Start: 2023-10-25

## 2023-10-04 RX ORDER — METHYLPREDNISOLONE SODIUM SUCCINATE 40 MG/ML
40 INJECTION, POWDER, LYOPHILIZED, FOR SOLUTION INTRAMUSCULAR; INTRAVENOUS ONCE
Status: CANCELLED
Start: 2023-10-11 | End: 2023-10-05

## 2023-10-04 RX ORDER — METHYLPREDNISOLONE SODIUM SUCCINATE 40 MG/ML
40 INJECTION, POWDER, LYOPHILIZED, FOR SOLUTION INTRAMUSCULAR; INTRAVENOUS ONCE
Status: COMPLETED | OUTPATIENT
Start: 2023-10-04 | End: 2023-10-04

## 2023-10-04 RX ADMIN — METHYLPREDNISOLONE SODIUM SUCCINATE 40 MG: 40 INJECTION, POWDER, FOR SOLUTION INTRAMUSCULAR; INTRAVENOUS at 13:15

## 2023-10-04 RX ADMIN — IRON SUCROSE 200 MG: 20 INJECTION, SOLUTION INTRAVENOUS at 13:26

## 2023-10-04 RX ADMIN — DIPHENHYDRAMINE HYDROCHLORIDE 50 MG: 50 INJECTION, SOLUTION INTRAMUSCULAR; INTRAVENOUS at 12:35

## 2023-10-04 RX ADMIN — OMALIZUMAB 300 MG: 150 INJECTION, SOLUTION SUBCUTANEOUS at 14:53

## 2023-10-04 NOTE — PROGRESS NOTES
Pt tolerated infusion well. Declined hydration today. Tolerated injections. Discharged in satisfactory condition.

## 2023-10-05 NOTE — PSYCH
MEDICATION MANAGEMENT NOTE        [facility] [address] [facility]      Name and Date of Birth:  Gordon Escobedo 46 y.o. 1975 MRN: [id number]    Date of Visit: October 2, 2023     Visit Time    Visit Start Time: 6081  Visit Stop Time: 1450  Total Visit Duration: 40 minutes    Reason for Visit: Follow-up visit regarding medication management     Chief Complaint: "I feel like I'm taking too much medication."    SUBJECTIVE:    Gordon Escobedo is a 46 y.o., female, possessing a past psychiatric history significant for anxiety, medically complicated by mast cell activation syndrome, who was personally seen and evaluated at the [facility] outpatient clinic for follow-up regarding medication management. She was recently discharged from partial hospitalization program, after she had been in the inpatient hospital at [facility] adult [facility]. She was discharged on Abilify 10 mg daily, Zoloft 100mg daily, and Trazodone 50mg qHS. During interview today, Tiffanyorlando Janae states that since their discharged from the [facility] program, she has been doing "pretty good". Admits that she did decrease her psychotropic medications, cutting them in half. States that she does feel better since she has decreased her medications, not feeling as anhedonic. She states that she does not feel she needs long-term stabilization with antidepressants, as she states "I was not depressed ". She states that she was feeling very overwhelmed and anxious regarding her medical symptoms, as she states that she continues to struggle with her mast cell activation syndrome. States that as she is receiving treatment for this, she is trying to feel more positive and future and goal oriented. She states that things are going well at home, she has no acute stressors better, and she continues to have support from her .   She states that she is frustrated with some of the documentation from when she was hospitalized, as she states that she believes she does have a seizure disorder and it is not psychogenic nonepileptic seizures. However, she states that she was never on a seizure medication, is unsure if the seizure was caused by her withdrawal from benzodiazepines. Nevertheless, she states that she does want to continue following up with all of her providers, does state that she feels the partial program was helpful for her mood. States she wants to continue trying to decrease her antidepressants to reduce polypharmacy. Current Rating Scores:   None completed today. Psychiatric Review Of Systems:    Appetite: no, no change  Adverse eating: no  Weight changes: no  Insomnia/sleeplessness: no  Fatigue/anergy: no  Anhedonia/lack of interest: no  Attention/concentration: no  Psychomotor agitation/retardation: no  Somatic symptoms: no  Anxiety/panic attack: worrying  Kendal/hypomania: no  Hopelessness/helplessness/worthlessness: no  Self-injurious behavior/high-risk behavior: no  Suicidal ideation: no  Homicidal ideation: no  Auditory hallucinations: no  Visual hallucinations: no  Other perceptual disturbances: no  Delusional thinking: no  Obsessive/compulsive symptoms: no    Review Of Systems:      Constitutional negative   ENT negative   Cardiovascular negative   Respiratory negative   Gastrointestinal negative   Genitourinary negative   Musculoskeletal negative   Integumentary negative   Neurological negative   Endocrine negative   Other Symptoms none, all other systems are negative     History Review: The following portions of the patient's history were reviewed and updated as appropriate: allergies, current medications, past family history, past medical history, past social history, past surgical history and problem list..         OBJECTIVE:     Vital signs in last 24 hours: There were no vitals filed for this visit.     Mental Status Evaluation:    Appearance age appropriate, casually dressed Behavior cooperative, calm   Speech normal rate, normal volume, normal pitch   Mood normal, improved, euthymic   Affect normal range and intensity, appropriate   Thought Processes organized, goal directed   Associations intact associations   Thought Content no overt delusions   Perceptual Disturbances: no auditory hallucinations, no visual hallucinations   Abnormal Thoughts  Risk Potential Suicidal ideation - None  Homicidal ideation - None  Potential for aggression - No   Orientation oriented to person, place, time/date and situation   Memory recent and remote memory grossly intact   Consciousness alert and awake   Attention Span Concentration Span attention span and concentration are age appropriate   Intellect appears to be of average intelligence   Insight intact   Judgement intact   Muscle Strength and  Gait normal muscle strength and normal muscle tone, normal gait and normal balance   Motor activity no abnormal movements   Fund of Knowledge adequate knowledge of current events  adequate fund of knowledge regarding past history  adequate fund of knowledge regarding vocabulary    Pain none   Pain Scale 0       Laboratory Results: I have personally reviewed all pertinent laboratory/tests results    Recent Labs (last 2 months):   Hospital Outpatient Visit on 09/20/2023   Component Date Value   • Case Report 09/20/2023                      Value:Surgical Pathology Report                         Case: [id number]                                   Authorizing Provider:  Yuliana Paris MD    Collected:           09/20/2023 0835              Ordering Location:     [facility]      Received:            09/20/2023 [facility]                                                           Pathologist:           Darling Yen MD                                                             Specimens:   A) - Duodenum, Duodenum HX crohn's B) - Stomach, Gastric Hx Chrohn's - Stain for mast cells                                            C) - Esophagus, Distal Esophagus  Hx Chrohn's - Stain for mast cells                                D) - Esophagus, Proximal Esophagus  Hx Chrohn's - Stain for mast cells                              E) - Large Intestine, Left/Descending Colon, Descending colon - hx crohn's                                                    F) - Large Intestine, Sigmoid Colon, Sigmoid colon - hx crohn's                                     G) - Rectum, Rectum - hx crohn's                                                          • Addendum 09/20/2023                      Value: This result contains rich text formatting which cannot be displayed here. • Final Diagnosis 09/20/2023                      Value: This result contains rich text formatting which cannot be displayed here. • Additional Information 09/20/2023                      Value: This result contains rich text formatting which cannot be displayed here. • Gross Description 09/20/2023                      Value: This result contains rich text formatting which cannot be displayed here.    Appointment on 09/02/2023   Component Date Value   • WBC 09/02/2023 7.71    • RBC 09/02/2023 4.69    • Hemoglobin 09/02/2023 13.5    • Hematocrit 09/02/2023 43.2    • MCV 09/02/2023 92    • MCH 09/02/2023 28.8    • MCHC 09/02/2023 31.3 (L)    • RDW 09/02/2023 14.7    • MPV 09/02/2023 9.7    • Platelets 05/00/3828 492 (H)    • nRBC 09/02/2023 0    • Neutrophils Relative 09/02/2023 50    • Immat GRANS % 09/02/2023 0    • Lymphocytes Relative 09/02/2023 35    • Monocytes Relative 09/02/2023 10    • Eosinophils Relative 09/02/2023 3    • Basophils Relative 09/02/2023 2 (H)    • Neutrophils Absolute 09/02/2023 3.90    • Immature Grans Absolute 09/02/2023 0.02    • Lymphocytes Absolute 09/02/2023 2.71    • Monocytes Absolute 09/02/2023 0.76    • Eosinophils Absolute 09/02/2023 0.20    • Basophils Absolute 09/02/2023 0.12 (H)    • CRP 09/02/2023 4.5 (H)    • Sodium 09/02/2023 139    • Potassium 09/02/2023 3.9    • Chloride 09/02/2023 103    • CO2 09/02/2023 28    • ANION GAP 09/02/2023 8    • BUN 09/02/2023 11    • Creatinine 09/02/2023 0.71    • Glucose, Fasting 09/02/2023 83    • Calcium 09/02/2023 9.3    • AST 09/02/2023 20    • ALT 09/02/2023 26    • Alkaline Phosphatase 09/02/2023 56    • Total Protein 09/02/2023 7.1    • Albumin 09/02/2023 4.1    • Total Bilirubin 09/02/2023 0.42    • eGFR 09/02/2023 99    • Protime 09/02/2023 12.2    • INR 09/02/2023 0.91    • Iron Saturation 09/02/2023 11 (L)    • TIBC 09/02/2023 350    • Iron 09/02/2023 37 (L)    • UIBC 09/02/2023 313    • Ferritin 09/02/2023 15    Admission on 08/28/2023, Discharged on 08/28/2023   Component Date Value   • WBC 08/28/2023 10.65 (H)    • RBC 08/28/2023 4.25    • Hemoglobin 08/28/2023 12.6    • Hematocrit 08/28/2023 39.1    • MCV 08/28/2023 92    • MCH 08/28/2023 29.6    • MCHC 08/28/2023 32.2    • RDW 08/28/2023 14.6    • MPV 08/28/2023 8.5 (L)    • Platelets 86/84/2102 508 (H)    • nRBC 08/28/2023 0    • Neutrophils Relative 08/28/2023 80 (H)    • Immat GRANS % 08/28/2023 0    • Lymphocytes Relative 08/28/2023 12 (L)    • Monocytes Relative 08/28/2023 6    • Eosinophils Relative 08/28/2023 1    • Basophils Relative 08/28/2023 1    • Neutrophils Absolute 08/28/2023 8.52 (H)    • Immature Grans Absolute 08/28/2023 0.03    • Lymphocytes Absolute 08/28/2023 1.22    • Monocytes Absolute 08/28/2023 0.63    • Eosinophils Absolute 08/28/2023 0.14    • Basophils Absolute 08/28/2023 0.11 (H)    • Protime 08/28/2023 11.9    • INR 08/28/2023 0.86    • PTT 08/28/2023 23    • Sodium 08/28/2023 140    • Potassium 08/28/2023 3.5    • Chloride 08/28/2023 101    • CO2 08/28/2023 29    • ANION GAP 08/28/2023 10    • BUN 08/28/2023 13    • Creatinine 08/28/2023 0.76    • Glucose 08/28/2023 173 (H)    • Calcium 08/28/2023 9.4    • eGFR 08/28/2023 91    • Color, UA 08/28/2023 Yellow    • Clarity, UA 08/28/2023 Clear    • Specific Gravity, UA 08/28/2023 <=1.005    • pH, UA 08/28/2023 5.5    • Leukocytes, UA 08/28/2023 Negative    • Nitrite, UA 08/28/2023 Negative    • Protein, UA 08/28/2023 Negative    • Glucose, UA 08/28/2023 100 (1/10%) (A)    • Ketones, UA 08/28/2023 Negative    • Urobilinogen, UA 08/28/2023 0.2    • Bilirubin, UA 08/28/2023 Negative    • Occult Blood, UA 08/28/2023 Negative    • Total Bilirubin 08/28/2023 0.31    • Bilirubin, Direct 08/28/2023 0.13    • Alkaline Phosphatase 08/28/2023 56    • AST 08/28/2023 22    • ALT 08/28/2023 26    • Total Protein 08/28/2023 7.1    • Albumin 08/28/2023 4.1    • Lipase 08/28/2023 29    Admission on 07/24/2023, Discharged on 08/15/2023   Component Date Value   • Sodium 07/25/2023 141    • Potassium 07/25/2023 2.7 (LL)    • Chloride 07/25/2023 98    • CO2 07/25/2023 25    • ANION GAP 07/25/2023 18    • BUN 07/25/2023 8    • Creatinine 07/25/2023 0.81    • Glucose 07/25/2023 121    • Calcium 07/25/2023 9.8    • AST 07/25/2023 22    • ALT 07/25/2023 39    • Alkaline Phosphatase 07/25/2023 55    • Total Protein 07/25/2023 7.4    • Albumin 07/25/2023 4.4    • Total Bilirubin 07/25/2023 0.55    • eGFR 07/25/2023 84    • Magnesium 07/25/2023 2.1    • Phosphorus 07/25/2023 3.1    • WBC 07/25/2023 14.83 (H)    • RBC 07/25/2023 5.16 (H)    • Hemoglobin 07/25/2023 15.1    • Hematocrit 07/25/2023 47.0 (H)    • MCV 07/25/2023 91    • MCH 07/25/2023 29.3    • MCHC 07/25/2023 32.1    • RDW 07/25/2023 15.3 (H)    • MPV 07/25/2023 9.0    • Platelets 97/86/5873 642 (H)    • nRBC 07/25/2023 0    • Neutrophils Relative 07/25/2023 72    • Immat GRANS % 07/25/2023 1    • Lymphocytes Relative 07/25/2023 17    • Monocytes Relative 07/25/2023 8    • Eosinophils Relative 07/25/2023 1    • Basophils Relative 07/25/2023 1    • Neutrophils Absolute 07/25/2023 10.80 (H)    • Immature Grans Absolute 07/25/2023 0.11    • Lymphocytes Absolute 07/25/2023 2.57    • Monocytes Absolute 07/25/2023 1.19    • Eosinophils Absolute 07/25/2023 0.09    • Basophils Absolute 07/25/2023 0.07    • Vitamin B-12 07/25/2023 1,323 (H)    • Folate 07/25/2023 21.2    • Vit D, 25-Hydroxy 07/25/2023 19.6 (L)    • Sodium 07/25/2023 138    • Potassium 07/25/2023 5.4 (H)    • Chloride 07/25/2023 105    • CO2 07/25/2023 21    • ANION GAP 07/25/2023 12    • BUN 07/25/2023 10    • Creatinine 07/25/2023 0.64    • Glucose 07/25/2023 133    • Calcium 07/25/2023 9.5    • eGFR 07/25/2023 104    • Magnesium 07/25/2023 2.3    • Ventricular Rate 07/25/2023 99    • Atrial Rate 07/25/2023 99    • VA Interval 07/25/2023 118    • QRSD Interval 07/25/2023 86    • QT Interval 07/25/2023 538    • QTC Interval 07/25/2023 690    • P Axis 07/25/2023 33    • QRS Axis 07/25/2023 21    • T Wave Axis 07/25/2023 54    • POC Glucose 07/26/2023 152 (H)    • Sodium 07/26/2023 139    • Potassium 07/26/2023 3.7    • Chloride 07/26/2023 103    • CO2 07/26/2023 28    • ANION GAP 07/26/2023 8    • BUN 07/26/2023 9    • Creatinine 07/26/2023 0.49 (L)    • Glucose 07/26/2023 116    • Glucose, Fasting 07/26/2023 116 (H)    • Calcium 07/26/2023 8.9    • eGFR 07/26/2023 113    • Magnesium 07/26/2023 2.1    • POC Glucose 07/26/2023 156 (H)    • Sodium 07/28/2023 138    • Potassium 07/28/2023 4.9    • Chloride 07/28/2023 103    • CO2 07/28/2023 23    • ANION GAP 07/28/2023 12    • BUN 07/28/2023 13    • Creatinine 07/28/2023 0.64    • Glucose 07/28/2023 87    • Glucose, Fasting 07/28/2023 87    • Calcium 07/28/2023 8.7    • eGFR 07/28/2023 104    • Color, UA 08/02/2023 Straw    • Clarity, UA 08/02/2023 Clear    • Specific Gravity, UA 08/02/2023 1.010    • pH, UA 08/02/2023 7.0    • Leukocytes, UA 08/02/2023 Negative    • Nitrite, UA 08/02/2023 Negative    • Protein, UA 08/02/2023 Negative    • Glucose, UA 08/02/2023 Negative    • Ketones, UA 08/02/2023 Negative    • Bilirubin, UA 08/02/2023 Negative    • Occult Blood, UA 08/02/2023 Negative    • UROBILINOGEN UA 08/02/2023 Negative    • Sodium 08/08/2023 140    • Potassium 08/08/2023 4.0    • Chloride 08/08/2023 102    • CO2 08/08/2023 29    • ANION GAP 08/08/2023 9    • BUN 08/08/2023 11    • Creatinine 08/08/2023 0.67    • Glucose 08/08/2023 91    • Glucose, Fasting 08/08/2023 91    • Calcium 08/08/2023 9.2    • AST 08/08/2023 18    • ALT 08/08/2023 43    • Alkaline Phosphatase 08/08/2023 40    • Total Protein 08/08/2023 6.1 (L)    • Albumin 08/08/2023 3.8    • Total Bilirubin 08/08/2023 0.34    • eGFR 08/08/2023 102    • WBC 08/08/2023 11.91 (H)    • RBC 08/08/2023 4.66    • Hemoglobin 08/08/2023 14.2    • Hematocrit 08/08/2023 43.8    • MCV 08/08/2023 94    • MCH 08/08/2023 30.5    • MCHC 08/08/2023 32.4    • RDW 08/08/2023 15.5 (H)    • MPV 08/08/2023 8.9    • Platelets 61/42/0049 345    • nRBC 08/08/2023 0    • Neutrophils Relative 08/08/2023 60    • Immat GRANS % 08/08/2023 0    • Lymphocytes Relative 08/08/2023 29    • Monocytes Relative 08/08/2023 8    • Eosinophils Relative 08/08/2023 2    • Basophils Relative 08/08/2023 1    • Neutrophils Absolute 08/08/2023 7.08    • Immature Grans Absolute 08/08/2023 0.04    • Lymphocytes Absolute 08/08/2023 3.50    • Monocytes Absolute 08/08/2023 0.97    • Eosinophils Absolute 08/08/2023 0.21    • Basophils Absolute 08/08/2023 0.11 (H)    • Syphilis Total Antibody 08/08/2023 Non-reactive    • Cholesterol 08/08/2023 176    • Triglycerides 08/08/2023 148    • HDL, Direct 08/08/2023 59    • LDL Calculated 08/08/2023 87    • Non-HDL-Chol (CHOL-HDL) 08/08/2023 117    • Hemoglobin A1C 08/08/2023 5.9 (H)    • EAG 08/08/2023 123    • Color, UA 08/10/2023 Straw    • Clarity, UA 08/10/2023 Clear    • Specific Gravity, UA 08/10/2023 1.010    • pH, UA 08/10/2023 8.0    • Leukocytes, UA 08/10/2023 Negative    • Nitrite, UA 08/10/2023 Negative    • Protein, UA 08/10/2023 Negative    • Glucose, UA 08/10/2023 Negative    • Ketones, UA 08/10/2023 Negative    • Bilirubin, UA 08/10/2023 Negative    • Occult Blood, UA 08/10/2023 Negative    • UROBILINOGEN UA 08/10/2023 Negative    • Sodium 08/11/2023 141    • Potassium 08/11/2023 3.7    • Chloride 08/11/2023 104    • CO2 08/11/2023 28    • ANION GAP 08/11/2023 9    • BUN 08/11/2023 10    • Creatinine 08/11/2023 0.70    • Glucose 08/11/2023 88    • Glucose, Fasting 08/11/2023 88    • Calcium 08/11/2023 8.9    • AST 08/11/2023 21    • ALT 08/11/2023 39    • Alkaline Phosphatase 08/11/2023 41    • Total Protein 08/11/2023 5.9 (L)    • Albumin 08/11/2023 3.7    • Total Bilirubin 08/11/2023 0.32    • eGFR 08/11/2023 101    • Magnesium 08/11/2023 2.3    • Phosphorus 08/11/2023 4.0    • Lipase 08/11/2023 21    • Ventricular Rate 08/15/2023 102    • Atrial Rate 08/15/2023 102    • GA Interval 08/15/2023 124    • QRSD Interval 08/15/2023 88    • QT Interval 08/15/2023 336    • QTC Interval 08/15/2023 437    • P Axis 08/15/2023 -4    • QRS Axis 08/15/2023 47    • T Wave Axis 08/15/2023 47        Suicide/Homicide Risk Assessment:    The following interventions are recommended: contracts for safety at present - agrees to go to ED if feeling unsafe, contracts for safety at present - agrees to call Crisis Intervention Service if feeling unsafe. Although patient's acute lethality risk is low, long-term/chronic lethality risk is mildly elevated in the presence of depression. At the current moment, Claudette Will is future-oriented, forward-thinking, and demonstrates ability to act in a self-preserving manner as evidenced by volitionally presenting to the clinic today, seeking treatment. To mitigate future risk, patient should adhere to the recommendations below, avoid alcohol/illicit substance use, utilize community-based resources and familiar support and prioritize mental health treatment.  Presently, patient denies active suicidal/homicidal ideation in addition to thoughts of self-injury; contracts for safety. At conclusion of evaluation, patient is amenable to the recommendations below. Patient is amenable to calling/contacting the outpatient office including this writer if any acute adverse effects of their medication regimen arise in addition to any comments or concerns pertaining to their psychiatric management. Patient is amenable to calling/contacting crisis and/or attending to the nearest emergency department if their clinical condition deteriorates to assure their safety and stability, stating that they are able to appropriately confide in their  regarding their psychiatric state. Assessment/Plan:     Diagnoses and all orders for this visit:    Recurrent major depressive disorder, in partial remission ([address])  -     sertraline (Zoloft) 25 mg tablet; Take 1 tablet (25 mg total) by mouth daily  -     ARIPiprazole (ABILIFY) 2 mg tablet; Take 1 tablet (2 mg total) by mouth daily    Generalized anxiety disorder     Zayra Rodrigues is a 57-year-old female who has a history of depression and anxiety, mostly related to her medical issues. She has been recently been hospitalized following an overdose on benzodiazepines. She participated in the partial hospitalization program has noted improvement since then. She does not appear to be in acute danger to herself or others, wants to work on her issues with her therapist.      Treatment Recommendations/Precautions:    • We will decrease her Abilify to 2 mg daily, advised to take for 2 weeks then discontinue. We will also decrease her Zoloft to 25 mg daily, as patient is reporting feeling better with less medication and to reduce polypharmacy.   May need to consider other alternatives for mood stabilization, such as Lamictal.   • Medication management every 4 weeks  • Continue psychotherapy with own therapist  • Aware of 24 hour and weekend coverage for urgent situations accessed by calling [facility] main practice number  Patient advised to call 911 if feeling suicidal or homicidal before acting out on their thoughts and they expressed understanding. Medications Risks/Benefits      Risks, Benefits And Possible Side Effects Of Medications:    Risks, benefits, and possible side effects of medications explained to [place] and she verbalizes understanding and agreement for treatment. including: Risks and potential side effects of medication discussed with patient including serotonin syndrome, SIADH, worsening depression, suicidality, induction of concetta, GI upset, headaches, activation, sexual side effects, sedation, potential drug interactions, and others. Patient expressed understanding. .     Controlled Medication Discussion:     Not applicable    Psychotherapy Provided:     Individual psychotherapy provided: Yes  Medication education provided to [place]. Goals discussed during in session: continue improvement in anxiety. Discussed with [place] coping with health issues, medical problems and ongoing anxiety. Reassurance and supportive therapy provided.       Treatment Plan:    Completed and signed during the session: Not applicable - Treatment Plan not due at this session    This note was not shared with the patient due to this is a psychotherapy note      Osmin Snell DO 10/05/23

## 2023-10-11 ENCOUNTER — HOSPITAL ENCOUNTER (OUTPATIENT)
Dept: INFUSION CENTER | Facility: HOSPITAL | Age: 48
Discharge: HOME | End: 2023-10-11

## 2023-10-11 ENCOUNTER — HOSPITAL ENCOUNTER (OUTPATIENT)
Dept: INFUSION CENTER | Facility: HOSPITAL | Age: 48
Discharge: HOME | End: 2023-10-11
Attending: INTERNAL MEDICINE
Payer: COMMERCIAL

## 2023-10-11 VITALS — HEART RATE: 75 BPM | TEMPERATURE: 98.8 F | RESPIRATION RATE: 17 BRPM

## 2023-10-11 DIAGNOSIS — R63.0 ANOREXIA: ICD-10-CM

## 2023-10-11 DIAGNOSIS — R11.10 VOMITING AND DIARRHEA: Primary | ICD-10-CM

## 2023-10-11 DIAGNOSIS — R19.7 VOMITING AND DIARRHEA: Primary | ICD-10-CM

## 2023-10-11 DIAGNOSIS — E44.1 MILD PROTEIN-CALORIE MALNUTRITION (HCC): ICD-10-CM

## 2023-10-11 DIAGNOSIS — K50.119 CROHN'S DISEASE OF COLON WITH COMPLICATION (HCC): ICD-10-CM

## 2023-10-11 PROCEDURE — 96365 THER/PROPH/DIAG IV INF INIT: CPT

## 2023-10-11 PROCEDURE — 96375 TX/PRO/DX INJ NEW DRUG ADDON: CPT

## 2023-10-11 PROCEDURE — 96367 TX/PROPH/DG ADDL SEQ IV INF: CPT

## 2023-10-11 PROCEDURE — 96361 HYDRATE IV INFUSION ADD-ON: CPT

## 2023-10-11 RX ORDER — ACETAMINOPHEN 325 MG/1
650 TABLET ORAL ONCE
OUTPATIENT
Start: 2023-11-08

## 2023-10-11 RX ORDER — ACETAMINOPHEN 325 MG/1
650 TABLET ORAL ONCE
Status: COMPLETED | OUTPATIENT
Start: 2023-10-11 | End: 2023-10-11

## 2023-10-11 RX ORDER — METHYLPREDNISOLONE SODIUM SUCCINATE 40 MG/ML
40 INJECTION, POWDER, LYOPHILIZED, FOR SOLUTION INTRAMUSCULAR; INTRAVENOUS ONCE
OUTPATIENT
Start: 2023-11-08

## 2023-10-11 RX ORDER — DEXTROSE MONOHYDRATE 50 MG/ML
20 INJECTION, SOLUTION INTRAVENOUS ONCE
OUTPATIENT
Start: 2023-11-08

## 2023-10-11 RX ORDER — METHYLPREDNISOLONE SODIUM SUCCINATE 40 MG/ML
40 INJECTION, POWDER, LYOPHILIZED, FOR SOLUTION INTRAMUSCULAR; INTRAVENOUS ONCE
Status: COMPLETED | OUTPATIENT
Start: 2023-10-11 | End: 2023-10-11

## 2023-10-11 RX ORDER — DEXTROSE MONOHYDRATE 50 MG/ML
20 INJECTION, SOLUTION INTRAVENOUS ONCE
Status: COMPLETED | OUTPATIENT
Start: 2023-10-11 | End: 2023-10-11

## 2023-10-11 RX ORDER — METHYLPREDNISOLONE SODIUM SUCCINATE 40 MG/ML
40 INJECTION, POWDER, LYOPHILIZED, FOR SOLUTION INTRAMUSCULAR; INTRAVENOUS ONCE
Status: CANCELLED
Start: 2023-10-18 | End: 2023-10-12

## 2023-10-11 RX ADMIN — DEXTROSE 600 MG: 5 SOLUTION INTRAVENOUS at 10:05

## 2023-10-11 RX ADMIN — DEXTROSE 20 ML/HR: 5 SOLUTION INTRAVENOUS at 10:00

## 2023-10-11 RX ADMIN — METHYLPREDNISOLONE SODIUM SUCCINATE 40 MG: 40 INJECTION, POWDER, FOR SOLUTION INTRAMUSCULAR; INTRAVENOUS at 09:25

## 2023-10-11 RX ADMIN — DIPHENHYDRAMINE HYDROCHLORIDE 50 MG: 50 INJECTION, SOLUTION INTRAMUSCULAR; INTRAVENOUS at 08:55

## 2023-10-11 RX ADMIN — SODIUM CHLORIDE 1000 ML: 0.9 INJECTION, SOLUTION INTRAVENOUS at 08:51

## 2023-10-11 RX ADMIN — ACETAMINOPHEN 650 MG: 325 TABLET, FILM COATED ORAL at 09:25

## 2023-10-12 ENCOUNTER — HOSPITAL ENCOUNTER (OUTPATIENT)
Dept: INFUSION CENTER | Facility: HOSPITAL | Age: 48
End: 2023-10-12
Attending: INTERNAL MEDICINE

## 2023-10-13 NOTE — PROGRESS NOTES
07/25/23 0818   Team Meeting   Meeting Type Daily Rounds   Initial Conference Date 07/25/23   Team Members Present   Team Members Present Physician;Nurse;;; Occupational Therapist   Physician Team Member [id number] [facility] Team Member [facility] Management Team Member [facility] [address] Work Team Member Yudy Boyer   OT Team Member Diandra Lester   Patient/Family Present   Patient Present No   Patient's Family Present No     Patient is here as a 59 51 81. Patient is withdrawn to room and self. Patient responding to internal stimuli. Pt denies SI/HI and AH/VH at this time. Calm and cooperative. Discharge is pending. [facility]     HISTORY AND PHYSICAL    ADMISSION DATE:  10/12/2023  ATTENDING PHYSICIAN:  Lupe Lowery MD  PRIMARY CARE PHYSICIAN:  Dre Puentes MD    CHIEF COMPLAINT:  Altered mental status     HISTORY OF PRESENT ILLNESS:    Ced Carver is a 68 year old male past medical history of Parkinson's disease, hypertension, high cholesterol, MI presenting to the ED with a chief complaint of AMS. Patient was knocking on neighbors door making statements that he was John. He lives alone and neighbors were concerned and called paramedics. Also patient reported to the ED nurse that he hear voices saying \"they are going to kill me\".     Upon arrival to the ED the patient was afebrile, diagnosed with UTI, psych was consulted. He was started on abx and was admitted as observation.     Past Medical History  Past Medical History:   Diagnosis Date   • Autonomic dysfunction    • Cardiac arrest with ventricular fibrillation (CMD)    • Coronary artery disease    • Essential (primary) hypertension    • High cholesterol    • Myocardial infarction (CMD)    • Parkinson disease    • Parkinson's disease    • Restless leg    • Sarcoidosis    • Seizures (CMD)         Surgical History  Past Surgical History:   Procedure Laterality Date   • Cataract extraction, bilateral     • Coronary angioplasty with stent placement     • Esophagogastroduodenoscopy     • Gastrostomy tube placement          Social History  Social History     Tobacco Use   • Smoking status: Never   • Smokeless tobacco: Never   Vaping Use   • Vaping Use: never used   Substance Use Topics   • Alcohol use: Not Currently   • Drug use: Never       Family History  History reviewed. No pertinent family history.     Allergies  ALLERGIES:  Patient has no known allergies.    Medications  (Not in a hospital admission)      Review of Systems  Review of Systems   Unable to perform ROS: Mental status change         Last Recorded Vitals  Blood pressure (!) 199/100, pulse 83, temperature  98.6 °F (37 °C), temperature source Oral, resp. rate 13, height 5' 5\" (1.651 m), weight 72.8 kg (160 lb 7.9 oz), SpO2 95 %.    Physical Exam  Vitals and nursing note reviewed.   Constitutional:       Appearance: He is not ill-appearing.   HENT:      Head: Normocephalic.   Eyes:      General: No scleral icterus.     Conjunctiva/sclera: Conjunctivae normal.   Neck:      Vascular: No carotid bruit.   Cardiovascular:      Rate and Rhythm: Normal rate and regular rhythm.      Pulses: Normal pulses.      Heart sounds: Normal heart sounds. No murmur heard.  Pulmonary:      Effort: Pulmonary effort is normal. No respiratory distress.      Breath sounds: No wheezing or rhonchi.   Abdominal:      General: Bowel sounds are normal.      Palpations: Abdomen is soft.      Tenderness: There is no guarding.   Musculoskeletal:      Right lower leg: No edema.      Left lower leg: No edema.   Skin:     General: Skin is warm.      Findings: No rash.   Neurological:      Mental Status: He is alert and oriented to person, place, and time.   Psychiatric:         Mood and Affect: Mood normal.         Behavior: Behavior normal.          Imaging    XR CHEST PA OR AP 1 VIEW [[id number]] Collected: 10/13/23 0708   Order Status: Completed Updated: 10/13/23 0712   Narrative:     EXAM: XR CHEST AP OR PA     CLINICAL INDICATION: Altered mental status.     COMPARISON: 8/29/2023     FINDINGS:   Frontal view of the chest was obtained.     The cardiomediastinal silhouette is normal in size. Pulmonary vascularity   is within normal limits.     No evidence of focal consolidation, pleural effusion, or pneumothorax.   Streaky opacities left lung base likely atelectasis or scarring.    Impression:     No evidence of acute cardiopulmonary process.     Electronically Signed by: JUAN ALBERTO LUONG M.D.   Signed on: 10/13/2023 7:09 AM   Workstation ID: [id number]   CT HEAD WO CONTRAST [[id number]] Collected: 10/13/23 0126   Order Status: Completed Updated:  10/13/23 0131   Narrative:     CT head without contrast.     CLINICAL HISTORY: Altered mental status.     TECHNIQUE: Computed tomographic imaging of the head was performed without   contrast.     Adjustment of the mA and/or kV was done according to the patient's size.     COMPARISON: CT head without contrast from August 29, 2023.     FINDINGS:   Stable small calcification within the right frontal subcortical white   matter (image 51, series 4). No acute appearing intracranial hemorrhage. No   mass effect or midline shift. No blurring of gray-white matter   differentiation. No displaced skull fracture. Minimal sphenoid sinus   disease. Mastoid air cells are clear.    Impression:     Stable exam. No acute intracranial findings.     Electronically Signed by: RAMIRO OBREGON M.D.   Signed on: 10/13/2023 1:27 AM   Workstation ID: [id number]         Labs   Recent Labs   Lab 10/12/23  2217   WBC 3.0*   RBC 4.09*   HGB 12.8*   HCT 38.7*          Recent Labs   Lab 10/12/23  2217   SODIUM 137   POTASSIUM 3.6   CHLORIDE 108   CO2 27   BUN 39*   CREATININE 0.72   CALCIUM 8.7   ALBUMIN 3.6   BILIRUBIN 0.4   ALKPT 131*   GPT 11   AST 19   GLUCOSE 130*         Smoking Cessation  Counseling given: Not Answered      DVT Prophylaxis  SCDs, Lovenox    Code Status    Code Status: Full Resuscitation    Assessment/Plan:  Acute metabolic encephalopathy likely related to underlying Parkinson's dementia worsened with UTI present on admission  -Continue empiric ceftriaxone   -urine culture pending  -CT brain no acute intracranial abnormality seen  -On Sinemet.     Generalized weakness with Parkinson's dementia  -PT OT consulted    History of seizure disorder  -continue Keppra.     History of restless leg syndrome  -Continue Requip.     GERD  -continue PPI.    HX of CAD  -continue home meds    Disposition:   Pending clinical improvement     INPATIENT CERTIFICATION OF MEDICAL NECESSITY    I certify that hospital services are  reasonable and necessary for this patient and will be appropriately provided as inpatient services in accordance with the CMS 2-midnight benchmark under 42 .3(e).  This patient requires inpatient hospital services for medical treatment or medically required inpatient diagnostic study.  The reason this patient requires hospital services is because of       This patient's length of stay is estimated to be >2 midnights.    The plans for this patient's post-hospital care are expected to be:  __ Home - no services  __ Home Health Care  __ Skilled Nursing Facility  _x_ To be determined  __ Other ______________    All patient studies and labs have been reviewed.  All patient's questions and concerns were addressed.    Case reviewed in detail including shared decision making with Dr. Yogi Bautista, CNP   Best Practices Inpatient Care

## 2023-10-17 ENCOUNTER — TELEPHONE (OUTPATIENT)
Dept: GASTROENTEROLOGY | Facility: CLINIC | Age: 48
End: 2023-10-17

## 2023-10-17 ENCOUNTER — HOSPITAL ENCOUNTER (OUTPATIENT)
Dept: INFUSION CENTER | Facility: HOSPITAL | Age: 48
Discharge: HOME | End: 2023-10-17
Attending: INTERNAL MEDICINE
Payer: COMMERCIAL

## 2023-10-17 VITALS
DIASTOLIC BLOOD PRESSURE: 76 MMHG | SYSTOLIC BLOOD PRESSURE: 123 MMHG | TEMPERATURE: 98.2 F | RESPIRATION RATE: 17 BRPM | HEART RATE: 100 BPM

## 2023-10-17 DIAGNOSIS — R19.7 VOMITING AND DIARRHEA: Primary | ICD-10-CM

## 2023-10-17 DIAGNOSIS — R11.10 VOMITING AND DIARRHEA: Primary | ICD-10-CM

## 2023-10-17 DIAGNOSIS — K50.119 CROHN'S DISEASE OF COLON WITH COMPLICATION (HCC): ICD-10-CM

## 2023-10-17 DIAGNOSIS — R63.0 ANOREXIA: ICD-10-CM

## 2023-10-17 DIAGNOSIS — E44.1 MILD PROTEIN-CALORIE MALNUTRITION (HCC): ICD-10-CM

## 2023-10-17 DIAGNOSIS — K50.119 CROHN'S DISEASE OF COLON WITH COMPLICATION (HCC): Primary | ICD-10-CM

## 2023-10-17 RX ORDER — METHYLPREDNISOLONE SODIUM SUCCINATE 40 MG/ML
40 INJECTION, POWDER, LYOPHILIZED, FOR SOLUTION INTRAMUSCULAR; INTRAVENOUS ONCE
Status: CANCELLED
Start: 2023-10-18 | End: 2023-10-18

## 2023-10-17 RX ORDER — METHYLPREDNISOLONE SODIUM SUCCINATE 40 MG/ML
40 INJECTION, POWDER, LYOPHILIZED, FOR SOLUTION INTRAMUSCULAR; INTRAVENOUS ONCE
Status: COMPLETED | OUTPATIENT
Start: 2023-10-17 | End: 2023-10-17

## 2023-10-17 RX ADMIN — DIPHENHYDRAMINE HYDROCHLORIDE 50 MG: 50 INJECTION, SOLUTION INTRAMUSCULAR; INTRAVENOUS at 13:45

## 2023-10-17 RX ADMIN — SODIUM CHLORIDE 1000 ML: 0.9 INJECTION, SOLUTION INTRAVENOUS at 13:32

## 2023-10-17 RX ADMIN — METHYLPREDNISOLONE SODIUM SUCCINATE 40 MG: 40 INJECTION, POWDER, FOR SOLUTION INTRAMUSCULAR; INTRAVENOUS at 14:54

## 2023-10-17 NOTE — TELEPHONE ENCOUNTER
Pt declining ED evaluation. Pt calling infusion center to see if she can make an appointment 10/18 for IV hydration. Pt will continue to monitor symptoms and if she develops a fever or signs of dehydration will go to the ED. Virtual appointment with Dr. Astrid Licea 10/19.

## 2023-10-17 NOTE — TELEPHONE ENCOUNTER
Last OV 9/14/23  Flex Sigmoidoscopy 9/20/23      I connected with the patient on the phone. 20-30 loose/liquid bowel movements mixed with blood daily. RUQ and RLQ mild to moderate abdominal pain. On and off fevers this past week. Fever 2 nights ago. Patient eats or drinks has to run to the bathroom room. Last does of prednisone 10/3- tapered to 5mg. Skyrizi 2nd infusion on 10/11. Not taking Tumeric or Jessica-Kinjal due to allergies. Patient staying hydrated and mostly clear liquid diet. Since last OV patient states symptoms have gotten worse. Declining ED, would be willing to go to ED if GI provider is able to access her.

## 2023-10-17 NOTE — TELEPHONE ENCOUNTER
Labs/stool tests ordered. Left vm for patient to complete bloodwork today (glucose will be elevated) and  stool containers.

## 2023-10-17 NOTE — TELEPHONE ENCOUNTER
----- Message -----  From: Susana John  Sent: 10/16/2023   7:15 AM EDT  To: Gastroenterology Pod Clinical  Subject: Blood work                                       Morning Dr Ernestina Linares,  I am having alot of GI, Crohns issues, blood/watery stool, fever chills that's gone on for weeks and only gotten worse with my 2nd dose of skyrizi verses better. I am once again at the hospitalization point although I won't waste my time and energy even going into a ER. I know what I need is to stop everything orally, meds and food, for 48/72 hours and let everything calm down. Wherever you are this week is it possible to work on getting me a direct admit as I have exhausted all avenues at home and as stated am only getting worse each day. And the skyrizi in dextrose didn't help matters with my corn allergy. Please call or have the office nurse call so I can explain in better detail what's going on. I am really worried I'm going sepsis again.   Erma Drew   [phone]

## 2023-10-17 NOTE — PROGRESS NOTES
Pt called stating she's been having diarrhea since her [address] appointment last week and that the dextrose IV solution caused a flare in her Crohn's. Asking to schedule appointment for IV fluids per Dr. Priscilla Dickerson. Pt given an appointment for 1 pm today. Pt tolerated infusion well. Attempted x 1 to draw labs from IV site. Only able to draw enough blood for discard. Pt agreeable to go to lab to have them drawn. Sterile containers x 4 given to pt to collect stool samples ordered by Dr. Priscilla Dickerson. Pt discharged in satisfactory condition.

## 2023-10-18 ENCOUNTER — APPOINTMENT (OUTPATIENT)
Dept: LAB | Facility: HOSPITAL | Age: 48
End: 2023-10-18
Attending: INTERNAL MEDICINE
Payer: COMMERCIAL

## 2023-10-18 ENCOUNTER — HOSPITAL ENCOUNTER (OUTPATIENT)
Dept: INFUSION CENTER | Facility: HOSPITAL | Age: 48
End: 2023-10-18
Attending: INTERNAL MEDICINE

## 2023-10-18 DIAGNOSIS — K50.119 CROHN'S DISEASE OF COLON WITH COMPLICATION (HCC): ICD-10-CM

## 2023-10-18 LAB
ALBUMIN SERPL BCP-MCNC: 3.9 G/DL (ref 3.5–5)
ALP SERPL-CCNC: 66 U/L (ref 34–104)
ALT SERPL W P-5'-P-CCNC: 26 U/L (ref 7–52)
ANION GAP SERPL CALCULATED.3IONS-SCNC: 10 MMOL/L
AST SERPL W P-5'-P-CCNC: 28 U/L (ref 13–39)
BILIRUB SERPL-MCNC: 0.38 MG/DL (ref 0.2–1)
BUN SERPL-MCNC: 15 MG/DL (ref 5–25)
CALCIUM SERPL-MCNC: 9.3 MG/DL (ref 8.4–10.2)
CHLORIDE SERPL-SCNC: 103 MMOL/L (ref 96–108)
CO2 SERPL-SCNC: 22 MMOL/L (ref 21–32)
CREAT SERPL-MCNC: 0.85 MG/DL (ref 0.6–1.3)
CRP SERPL QL: 12.6 MG/L
ERYTHROCYTE [DISTWIDTH] IN BLOOD BY AUTOMATED COUNT: 18.6 % (ref 11.6–15.1)
GFR SERPL CREATININE-BSD FRML MDRD: 80 ML/MIN/1.73SQ M
GLUCOSE SERPL-MCNC: 94 MG/DL (ref 65–140)
HCT VFR BLD AUTO: 43.7 % (ref 34.8–46.1)
HGB BLD-MCNC: 13.9 G/DL (ref 11.5–15.4)
MCH RBC QN AUTO: 29.3 PG (ref 26.8–34.3)
MCHC RBC AUTO-ENTMCNC: 31.8 G/DL (ref 31.4–37.4)
MCV RBC AUTO: 92 FL (ref 82–98)
PLATELET # BLD AUTO: 456 THOUSANDS/UL (ref 149–390)
PMV BLD AUTO: 8.8 FL (ref 8.9–12.7)
POTASSIUM SERPL-SCNC: 4.4 MMOL/L (ref 3.5–5.3)
PROT SERPL-MCNC: 7 G/DL (ref 6.4–8.4)
RBC # BLD AUTO: 4.74 MILLION/UL (ref 3.81–5.12)
SODIUM SERPL-SCNC: 135 MMOL/L (ref 135–147)
WBC # BLD AUTO: 12.85 THOUSAND/UL (ref 4.31–10.16)

## 2023-10-18 PROCEDURE — 80053 COMPREHEN METABOLIC PANEL: CPT | Performed by: INTERNAL MEDICINE

## 2023-10-18 PROCEDURE — 83993 ASSAY FOR CALPROTECTIN FECAL: CPT

## 2023-10-18 PROCEDURE — 87425 ROTAVIRUS AG IA: CPT

## 2023-10-18 PROCEDURE — 86140 C-REACTIVE PROTEIN: CPT | Performed by: INTERNAL MEDICINE

## 2023-10-18 PROCEDURE — 87505 NFCT AGENT DETECTION GI: CPT

## 2023-10-18 PROCEDURE — 82705 FATS/LIPIDS FECES QUAL: CPT

## 2023-10-18 PROCEDURE — 85027 COMPLETE CBC AUTOMATED: CPT | Performed by: INTERNAL MEDICINE

## 2023-10-18 PROCEDURE — 82653 EL-1 FECAL QUANTITATIVE: CPT

## 2023-10-19 ENCOUNTER — APPOINTMENT (OUTPATIENT)
Dept: LAB | Facility: HOSPITAL | Age: 48
End: 2023-10-19
Payer: COMMERCIAL

## 2023-10-19 ENCOUNTER — HOSPITAL ENCOUNTER (OUTPATIENT)
Dept: INFUSION CENTER | Facility: HOSPITAL | Age: 48
Discharge: HOME | End: 2023-10-19
Payer: COMMERCIAL

## 2023-10-19 ENCOUNTER — TELEPHONE (OUTPATIENT)
Dept: GASTROENTEROLOGY | Facility: CLINIC | Age: 48
End: 2023-10-19

## 2023-10-19 ENCOUNTER — TELEMEDICINE (OUTPATIENT)
Dept: GASTROENTEROLOGY | Facility: CLINIC | Age: 48
End: 2023-10-19

## 2023-10-19 VITALS
HEART RATE: 102 BPM | SYSTOLIC BLOOD PRESSURE: 122 MMHG | TEMPERATURE: 97.9 F | RESPIRATION RATE: 18 BRPM | DIASTOLIC BLOOD PRESSURE: 80 MMHG

## 2023-10-19 DIAGNOSIS — E44.1 MILD PROTEIN-CALORIE MALNUTRITION (HCC): ICD-10-CM

## 2023-10-19 DIAGNOSIS — D84.9 IMMUNOSUPPRESSED STATUS (HCC): ICD-10-CM

## 2023-10-19 DIAGNOSIS — Z79.52 IMMUNOSUPPRESSION DUE TO CHRONIC STEROID USE: ICD-10-CM

## 2023-10-19 DIAGNOSIS — L93.2 DRUG-INDUCED LUPUS ERYTHEMATOSUS: ICD-10-CM

## 2023-10-19 DIAGNOSIS — R19.7 DIARRHEA, UNSPECIFIED TYPE: ICD-10-CM

## 2023-10-19 DIAGNOSIS — R63.0 ANOREXIA: Primary | ICD-10-CM

## 2023-10-19 DIAGNOSIS — T50.905A DRUG-INDUCED LUPUS ERYTHEMATOSUS: ICD-10-CM

## 2023-10-19 DIAGNOSIS — T38.0X5A IMMUNOSUPPRESSION DUE TO CHRONIC STEROID USE: ICD-10-CM

## 2023-10-19 DIAGNOSIS — R63.0 ANOREXIA: ICD-10-CM

## 2023-10-19 DIAGNOSIS — K50.119 CROHN'S DISEASE OF COLON WITH COMPLICATION (HCC): Primary | ICD-10-CM

## 2023-10-19 DIAGNOSIS — D89.40 MAST CELL ACTIVATION SYNDROME (HCC): ICD-10-CM

## 2023-10-19 DIAGNOSIS — R11.10 VOMITING AND DIARRHEA: ICD-10-CM

## 2023-10-19 DIAGNOSIS — R11.10 VOMITING AND DIARRHEA: Primary | ICD-10-CM

## 2023-10-19 DIAGNOSIS — R19.7 VOMITING AND DIARRHEA: ICD-10-CM

## 2023-10-19 DIAGNOSIS — R19.7 VOMITING AND DIARRHEA: Primary | ICD-10-CM

## 2023-10-19 DIAGNOSIS — D84.821 IMMUNOSUPPRESSION DUE TO CHRONIC STEROID USE: ICD-10-CM

## 2023-10-19 LAB
C DIFF TOX B TCDB STL QL NAA+PROBE: NEGATIVE
CAMPYLOBACTER DNA SPEC NAA+PROBE: NORMAL
RV AG STL QL: NEGATIVE
SALMONELLA DNA SPEC QL NAA+PROBE: NORMAL
SHIGA TOXIN STX GENE SPEC NAA+PROBE: NORMAL
SHIGELLA DNA SPEC QL NAA+PROBE: NORMAL

## 2023-10-19 PROCEDURE — 87177 OVA AND PARASITES SMEARS: CPT

## 2023-10-19 PROCEDURE — 96375 TX/PRO/DX INJ NEW DRUG ADDON: CPT

## 2023-10-19 PROCEDURE — 87209 SMEAR COMPLEX STAIN: CPT

## 2023-10-19 PROCEDURE — 96361 HYDRATE IV INFUSION ADD-ON: CPT

## 2023-10-19 PROCEDURE — 96365 THER/PROPH/DIAG IV INF INIT: CPT

## 2023-10-19 RX ORDER — METHYLPREDNISOLONE SODIUM SUCCINATE 40 MG/ML
40 INJECTION, POWDER, LYOPHILIZED, FOR SOLUTION INTRAMUSCULAR; INTRAVENOUS ONCE
Status: CANCELLED
Start: 2023-10-20 | End: 2023-10-20

## 2023-10-19 RX ORDER — METHYLPREDNISOLONE SODIUM SUCCINATE 40 MG/ML
40 INJECTION, POWDER, LYOPHILIZED, FOR SOLUTION INTRAMUSCULAR; INTRAVENOUS ONCE
Status: COMPLETED | OUTPATIENT
Start: 2023-10-19 | End: 2023-10-19

## 2023-10-19 RX ADMIN — METHYLPREDNISOLONE SODIUM SUCCINATE 40 MG: 40 INJECTION, POWDER, FOR SOLUTION INTRAMUSCULAR; INTRAVENOUS at 14:41

## 2023-10-19 RX ADMIN — FAMOTIDINE 40 MG: 10 INJECTION, SOLUTION INTRAVENOUS at 13:43

## 2023-10-19 RX ADMIN — DIPHENHYDRAMINE HYDROCHLORIDE 50 MG: 50 INJECTION, SOLUTION INTRAMUSCULAR; INTRAVENOUS at 14:10

## 2023-10-19 RX ADMIN — SODIUM CHLORIDE 1000 ML: 0.9 INJECTION, SOLUTION INTRAVENOUS at 13:19

## 2023-10-19 NOTE — PROGRESS NOTES
Virtual Regular Visit    Verification of patient location:    Patient is located at Home in the following state in which I hold an active license [place]      Assessment/Plan:    The patient is a 27-year-old woman with hereditary alpha trip dysemia, Hashimoto's, inflammatory bowel disease thought to be Crohn's versus indeterminate colitis previously on anti-TNF therapy but complicated by drug-induced lupus for which she was subsequently on Stelara but felt that she was not not responding over time and then switched to [facility] [place] for which she is receiving induction dosing, prior diagnosis of mast cell activation syndrome who presents for follow-up. She has been having a flare with diarrhea and not feeling well. Endoscopy and colonoscopy from October 2021 grossly normal and biopsies with patchy chronic active left-sided colitis and some quiescent colitis. There was also some erythema and erosion in the stomach. Endoscopy from May 2023 with erythema and erosion in the stomach. Colonoscopy with mild patchy erythema and granular mucosa on the left side. Biopsies with inactive gastritis and chronic active colitis along the left colon with some active inflammation in the rectum and at 30 cm. Most recent EGD normal in appearance. Flexible sigmoidoscopy with mild abnormal mucosa in the sigmoid and rectosigmoid colon. Biopsies without significant increase in mast cells. Biopsies of the duodenum unremarkable, mild chronic gastritis noted negative for H. pylori or intestinal metaplasia, benign esophageal biopsies, relatively benign colonic biopsies although some cryptitis and acute inflammation in the lamina propria in the sigmoid colon as well as some crypt distortion in the rectum and sigmoid colon. MRI/MRCP with no interval progression of mild central intrahepatic and extrahepatic bile duct prominence. Prior abdominal ultrasound with cholecystectomy.   Most recent MRCP again with dilated common bile duct that remains unchanged and distal periampullary portion of the common bile duct not visualized suggesting a distal biliary stricture as well as hepatic steatosis. CT chest abdomen pelvis with concern for pleural-based lesion as well as trace pneumobilia and slight extrahepatic biliary ductal dilatation. Most recent CMP normal.  CBC with white count of 12.85 with normal hemoglobin, MCV. Platelets 759. CRP 12.6.       Problem List Items Addressed This Visit          Digestive    Crohn's colitis ([address]) - Primary       Other    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[address])    Immunosuppressed status ([address])    Mild protein-calorie malnutrition ([address])    Immunosuppression due to chronic steroid use      Other Visit Diagnoses       Diarrhea, unspecified type        Drug-induced lupus erythematosus              Awaiting stool tests  Continue Skyrizi and may need extra steroids and hydration  Continue prednisone ans taper    Consider ERCP in the future given concern for CBD stricture at the distal duct    Next colonoscopy in 2024  Next enterography to be determined  Next blood work TBD  Continue Pepcid    She is currently receiving intermittent fluid hydration as per recommendations of her mast cell specialist at the the [id number] iron infusions    May need increase in hydration, IV steroids, IV supportive medications    Continue to follow-up with multiple specialists  Ongoing IBD Healthcare maintenance         Reason for visit is   Chief Complaint   Patient presents with    Virtual Regular Visit          Encounter provider Leslie Hutchinson MD    Provider located at [address] [place] [id number] [phone]      Recent Visits  Date Type Provider Dept   10/17/23 Telephone MD Epi [facility]   10/17/23 Telephone [facility] Showing recent visits within past 7 days and meeting all other requirements  Today's Visits  Date Type Provider Dept   10/19/23 Telephone Goran Reveles MD [facility]   10/19/23 Telemedicine Goran Reveles MD [facility]   Showing today's visits and meeting all other requirements  Future Appointments  No visits were found meeting these conditions. Showing future appointments within next 150 days and meeting all other requirements       The patient was identified by name and date of birth. Padmini Harper was informed that this is a telemedicine visit and that the visit is being conducted through the APR. She agrees to proceed. .  My office door was closed. No one else was in the room. She acknowledged consent and understanding of privacy and security of the video platform. The patient has agreed to participate and understands they can discontinue the visit at any time. Patient is aware this is a billable service. Subjective  Padmini Harper is a 46 y.o. female with Crohn's. She is trying the low FODMAP diet. In [place] and [place] she drank the water and now maybe it is aggravated. She is more inflamed. She was weaned off abilify and zoloft. Hair falling out. Nails breaking. Headaches daily. Fatigued and not sleeping. Inconsistent bowels, all liquid. No formed stools. Ongoing since recent travel. As she gets down things get worse. Last week when she had her Skyrizi. She is having connective tissue issues recently.        Past Medical History:   Diagnosis Date    Anemia 2007    Anxiety     Asthma     Bile duct stricture 2014    Sphincter of oddi dysfunction    Chronic kidney disease     Colon polyp 1998    Crohn's colitis ([address])     Deep vein thrombosis ([address])     Disease of thyroid gland     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    Generalized anxiety disorder 08/26/2017    GERD (gastroesophageal reflux disease) 1995    GI (gastrointestinal bleed) 1994    Heart murmur     Inflammatory bowel disease     Irritable bowel syndrome 2016    Lactose intolerance 1982    Mast cell disease     Migraine     Myocardial infarction ([address])     NSTEMI. pt denies, documented in cardio note    Pancreatitis     sphinger of     Psychiatric disorder     RA (rheumatoid arthritis) ([address])     Seizures ([address])     Ulcerative colitis ([address])     Visual impairment        Past Surgical History:   Procedure Laterality Date    ABDOMINAL SURGERY  2017    APPENDECTOMY      CHOLECYSTECTOMY      COLONOSCOPY  2019    CYSTOSCOPY N/A 6/8/2023    Procedure: CYSTOSCOPY, mini TURBT with fulgeration;  Surgeon: Adore Oliver MD;  Location: [facility];  Service: Urology    EGD AND COLONOSCOPY N/A 09/12/2017    Procedure: EGD AND COLONOSCOPY;  Surgeon: Kevin Jim MD;  Location: [facility]; Service: Gastroenterology    ERCP N/A 09/15/2017    Procedure: ENDOSCOPIC RETROGRADE CHOLANGIOPANCREATOGRAPHY (ERCP); Surgeon: Reno Denise MD;  [facility]: [facility];   Service: Gastroenterology    HYSTERECTOMY      KNEE SURGERY Right     LAPAROSCOPIC ENDOMETRIOSIS FULGURATION      ORIF ANKLE FRACTURE Left     WA ARTHRS KNE SURG W/MENISCECTOMY MED/LAT W/SHVG Left 05/12/2017    Procedure: POSSIBLE MEDIAL MENISECTOMY;  Surgeon: Arleth Alberto MD;  Location: [facility];  Service: Orthopedics    WA ARTHRS KNEE DEBRIDEMENT/SHAVING ARTCLR CRTLG Left 05/12/2017    Procedure: KNEE ARTHROSCOPY EVALUATION, CHONDROPLASTY;  Surgeon: Arleth Alberto MD;  Location: [facility];  Service: Orthopedics    WA LAPS ABD PRTM&OMENTUM DX W/WO SPEC BR/WA SPX N/A 12/12/2017    Procedure: LAPAROSCOPY DIAGNOSTIC  WITH LYSIS OF ADHESIONS;  Surgeon: Carmelina Patel DO;  Location:  MAIN OR;  Service: General    UPPER GASTROINTESTINAL ENDOSCOPY  2019       Current Outpatient Medications   Medication Sig Dispense Refill    ARIPiprazole (ABILIFY) 2 mg tablet Take 1 tablet (2 mg total) by mouth daily 7 tablet 0    B-D 3CC LUER-SHAMEKA SYR 25GX1" 25G X 1" 3 ML MISC       butalbital-acetaminophen-caffeine (FIORICET,ESGIC) -40 mg per tablet Take 1 tablet by mouth every 8 (eight) hours as needed for migraine 20 tablet 1    cetirizine (ZyrTEC) 10 mg tablet Take 20 mg by mouth daily      Cholecalciferol 10 MCG /0.025ML LIQD Take 1,000 Units by mouth 2 (two) times a day (Patient not taking: Reported on 9/13/2023) 750 mL 1    cyanocobalamin (VITAMIN B-12) 100 mcg tablet Take by mouth daily      diclofenac (VOLTAREN) 75 mg EC tablet       EpiPen 2-Malachi 0.3 MG/0.3ML SOAJ INJECT 1 PEN INTO THE MUSCLE AS NEEDED FOR ANAPHALAXIS      famotidine (PEPCID) 40 MG tablet Take 0.5 tablets (20 mg total) by mouth daily 90 tablet 0    hydrOXYzine HCL (ATARAX) 25 mg tablet       Lactobacillus (PROBIOTIC ACIDOPHILUS PO) Take by mouth      Lactobacillus-Inulin ([facility]) CAPS Take 200 mg by mouth daily      levalbuterol (XOPENEX HFA) 45 mcg/act inhaler Inhale 1-2 puffs every 4 (four) hours as needed for shortness of breath 45 g 2    levothyroxine 100 mcg tablet Take 100 mcg by mouth every other day      liothyronine (CYTOMEL) 5 mcg tablet Take 1 tablet (5 mcg total) by mouth daily Do not start before June 14, 2023. 30 tablet 0    Melatonin 3 MG SUBL  (Patient not taking: Reported on 9/14/2023)      melatonin 3 mg Take 1 tablet (3 mg total) by mouth daily at bedtime 30 tablet 0    montelukast (SINGULAIR) 10 mg tablet Take 1 tablet (10 mg total) by mouth daily at bedtime  0    NON FORMULARY immunoglobin sq 6g 30 ml once a week      nystatin (MYCOSTATIN) 500,000 units/5 mL suspension Swish and spit 5 mL (500,000 Units total) 4 (four) times a day (Patient not taking: Reported on 9/13/2023) 473 mL 0    omalizumab (XOLAIR) 150 mg Inject 2.4 mL (300 mg total) under the skin every 28 days 1 each 2    pantoprazole (PROTONIX) 40 mg tablet TAKE ONE TABLET BY MOUTH TWICE A DAY 60 tablet 5    predniSONE 20 mg tablet Take 1 tablet (20 mg total) by mouth daily Take as instructed by physician. 20 mg three times a day for 3 days, then 20 mg twice daily 60 tablet 0    Riboflavin (VITAMIN B-2 PO) Take by mouth      sertraline (Zoloft) 25 mg tablet Take 1 tablet (25 mg total) by mouth daily 15 tablet 0    TechLite Lancets MISC       traMADol (Ultram) 50 mg tablet Take 1 tablet (50 mg total) by mouth every 12 (twelve) hours as needed for moderate pain or severe pain (Patient not taking: Reported on 9/14/2023) 60 tablet 0    traZODone (DESYREL) 50 mg tablet Take 1 tablet (50 mg total) by mouth daily at bedtime 30 tablet 0     No current facility-administered medications for this visit.         Allergies   Allergen Reactions    Aimovig [Erenumab-Aooe] Anaphylaxis    Amitriptyline Anaphylaxis     According to the patient she had nphylaxis    Aspergillus Allergy Skin Test Other (See Comments), Hives and Swelling    Azathioprine Other (See Comments) and Anaphylaxis     pancreatitis  According to patient she needed Epipen    Banana - Food Allergy Itching, Swelling and Anaphylaxis    Buspar [Buspirone] Hives and Shortness Of Breath    Citric Acid-Polysorbate 80 Abdominal Pain, Anaphylaxis, Anxiety, Bradycardia, Diarrhea, Fatigue, GI Intolerance, Headache, Hives, Hyperactivity, Itching, Lip Swelling, Nausea Only, Palpitations, Rash, Shortness Of Breath, Tachycardia, Throat Swelling, Tongue Swelling and Vomiting    Corn Dextrin Anaphylaxis     Any corn based products    Eggs Or Egg-Derived Products - Food Allergy Anaphylaxis    Epinephrine Anaphylaxis    Erenumab Anaphylaxis     patient sent portal message stating she had anaphylaxis  patient sent portal message stating she had anaphylaxis      Gadolinium Abdominal Pain, Anaphylaxis, Anxiety, Confusion, Delirium, Dizziness, Eye Swelling, Fatigue, GI Intolerance, Headache, Hives, Irritability, Itching, Lip Swelling, Nausea Only, Palpitations, Photosensitivity, Rash, Seizures, Shortness Of Breath, Swelling, Syncope, Throat Swelling, Tongue Swelling, Tremor, Visual Disturbance and Vomiting    Gelatin - Food Allergy Anaphylaxis    Heparin Hives     Painful welts     Lactated Ringers Shortness Of Breath     Pt questions this allergy, pt has received LR multiple times without reaction    Lavender Oil Anaphylaxis     Other reaction(s): anaphalxis    Malto Dextrin [Dextrin] Anaphylaxis    Other Anaphylaxis     Gel caps, maltodextrose, dextrose,grapes, lavender     Penicillium Notatum Shortness Of Breath and Swelling    Red Dye - Food Allergy Other (See Comments) and Anaphylaxis     intolerance    Sumatriptan Anaphylaxis     Bradycardia, sob, back pressure, head pain,throat tightness  According to the patient she had anphylaxis    Ustekinumab Anaphylaxis    Contrast [Iodinated Contrast Media] Other (See Comments)     Pt states needs to be premedicated prior to injection    Corn Oil - Food Allergy - Food Allergy Hives    Humira [Adalimumab] Other (See Comments)     "I develop drug induced lupus"    Ibuprofen Hives and Throat Swelling    Polyethylene Glycol Diarrhea and Vomiting    Remicade [Infliximab] Other (See Comments)     "I develop drug induced lupus"    Soy Allergy - Food Allergy Other (See Comments)    Sulfa Antibiotics Hives and Other (See Comments)    Sulfate Hives    Sulfites - Food Allergy Hives    Sweet Corn Extract Skin Test - Food Allergy Hives and Itching    Chlorhexidine Itching    Freederm Adhesive Remover [New Skin] Rash    Histamine Hives, Rash and Other (See Comments)     Intolerance         REVIEW OF SYSTEMS:  10 point ROS reviewed and negative, except as above        PHYSICAL EXAMINATION:  Appearance and vitals taken from home devices    General Appearance:   Alert, cooperative, no distress   HEENT:  Normocephalic, atraumatic, anicteric. Neck supple, symmetrical, trachea midline. Lungs:   Equal chest rise and unlabored breathing, normal effort, no coughing. Cardiovascular:   No visualized JVD. Abdomen:   No abdominal distension. Skin:   No jaundice, rashes, or lesions. Musculoskeletal:   Normal range of motion visualized. Psych:  Normal affect and normal insight. Neuro:  Alert and appropriate.          Visit Time  Total Visit Duration: 20

## 2023-10-19 NOTE — TELEPHONE ENCOUNTER
I called [employer] and spoke with the product quality team. [phone]. Provided list below of active and inactive ingredients in Skyrizi infusion and injections. Also list provided on the [employer]. Active ingredient: risankizumab-rzaa. SKYRIZI 75 mg/0.83 mL and 90 mg/mL inactive ingredients: polysorbate 20, sodium succinate, sorbitol, succinic acid, and Water for Injection, USP.  SKYRIZI 150 mg/mL, 180 mg/1.2 mL, 360 mg/2.4 mL, and 600 mg/ 10 mL inactive ingredients: glacial acetic acid, polysorbate 20, sodium acetate, trehalose, and Water for Injection, USP.

## 2023-10-19 NOTE — TELEPHONE ENCOUNTER
Hi,    Can you call the company that makes [address] and find out if there is dextrose or corn derivatives in the OBI? Thank you!

## 2023-10-20 ENCOUNTER — HOSPITAL ENCOUNTER (OUTPATIENT)
Dept: INFUSION CENTER | Facility: HOSPITAL | Age: 48
End: 2023-10-20
Attending: INTERNAL MEDICINE
Payer: COMMERCIAL

## 2023-10-20 VITALS
SYSTOLIC BLOOD PRESSURE: 123 MMHG | OXYGEN SATURATION: 97 % | TEMPERATURE: 97.6 F | HEART RATE: 98 BPM | RESPIRATION RATE: 18 BRPM | DIASTOLIC BLOOD PRESSURE: 72 MMHG

## 2023-10-20 DIAGNOSIS — R19.7 VOMITING AND DIARRHEA: Primary | ICD-10-CM

## 2023-10-20 DIAGNOSIS — R63.0 ANOREXIA: ICD-10-CM

## 2023-10-20 DIAGNOSIS — R11.10 VOMITING AND DIARRHEA: Primary | ICD-10-CM

## 2023-10-20 DIAGNOSIS — E44.1 MILD PROTEIN-CALORIE MALNUTRITION (HCC): ICD-10-CM

## 2023-10-20 LAB
FAT STL QL: NORMAL
NEUTRAL FAT STL QL: NORMAL

## 2023-10-20 PROCEDURE — 96361 HYDRATE IV INFUSION ADD-ON: CPT

## 2023-10-20 PROCEDURE — 96365 THER/PROPH/DIAG IV INF INIT: CPT

## 2023-10-20 PROCEDURE — 96367 TX/PROPH/DG ADDL SEQ IV INF: CPT

## 2023-10-20 PROCEDURE — 96375 TX/PRO/DX INJ NEW DRUG ADDON: CPT

## 2023-10-20 RX ORDER — METHYLPREDNISOLONE SODIUM SUCCINATE 40 MG/ML
40 INJECTION, POWDER, LYOPHILIZED, FOR SOLUTION INTRAMUSCULAR; INTRAVENOUS ONCE
Status: COMPLETED | OUTPATIENT
Start: 2023-10-20 | End: 2023-10-20

## 2023-10-20 RX ORDER — METHYLPREDNISOLONE SODIUM SUCCINATE 40 MG/ML
40 INJECTION, POWDER, LYOPHILIZED, FOR SOLUTION INTRAMUSCULAR; INTRAVENOUS ONCE
Status: CANCELLED
Start: 2023-10-23 | End: 2023-10-21

## 2023-10-20 RX ADMIN — SODIUM CHLORIDE 1000 ML: 0.9 INJECTION, SOLUTION INTRAVENOUS at 08:15

## 2023-10-20 RX ADMIN — FAMOTIDINE 40 MG: 10 INJECTION, SOLUTION INTRAVENOUS at 08:46

## 2023-10-20 RX ADMIN — METHYLPREDNISOLONE SODIUM SUCCINATE 40 MG: 40 INJECTION, POWDER, FOR SOLUTION INTRAMUSCULAR; INTRAVENOUS at 09:59

## 2023-10-20 RX ADMIN — DIPHENHYDRAMINE HYDROCHLORIDE 50 MG: 50 INJECTION, SOLUTION INTRAMUSCULAR; INTRAVENOUS at 09:17

## 2023-10-21 LAB — CALPROTECTIN STL-MCNT: 2390 UG/G (ref 0–120)

## 2023-10-23 ENCOUNTER — HOSPITAL ENCOUNTER (OUTPATIENT)
Dept: INFUSION CENTER | Facility: HOSPITAL | Age: 48
Discharge: HOME | End: 2023-10-23
Payer: COMMERCIAL

## 2023-10-23 VITALS
DIASTOLIC BLOOD PRESSURE: 84 MMHG | HEART RATE: 108 BPM | SYSTOLIC BLOOD PRESSURE: 134 MMHG | TEMPERATURE: 96.8 F | RESPIRATION RATE: 18 BRPM

## 2023-10-23 DIAGNOSIS — E44.1 MILD PROTEIN-CALORIE MALNUTRITION (HCC): ICD-10-CM

## 2023-10-23 DIAGNOSIS — R63.0 ANOREXIA: ICD-10-CM

## 2023-10-23 DIAGNOSIS — R11.10 VOMITING AND DIARRHEA: Primary | ICD-10-CM

## 2023-10-23 DIAGNOSIS — R19.7 VOMITING AND DIARRHEA: Primary | ICD-10-CM

## 2023-10-23 DIAGNOSIS — K50.119 CROHN'S DISEASE OF COLON WITH COMPLICATION (HCC): Primary | ICD-10-CM

## 2023-10-23 PROCEDURE — 96365 THER/PROPH/DIAG IV INF INIT: CPT

## 2023-10-23 PROCEDURE — 96367 TX/PROPH/DG ADDL SEQ IV INF: CPT

## 2023-10-23 PROCEDURE — 96375 TX/PRO/DX INJ NEW DRUG ADDON: CPT

## 2023-10-23 PROCEDURE — 96361 HYDRATE IV INFUSION ADD-ON: CPT

## 2023-10-23 RX ORDER — FOLIC ACID 1 MG/1
1 TABLET ORAL DAILY
Qty: 30 TABLET | Refills: 3 | Status: SHIPPED | OUTPATIENT
Start: 2023-10-23

## 2023-10-23 RX ORDER — METHYLPREDNISOLONE SODIUM SUCCINATE 40 MG/ML
40 INJECTION, POWDER, LYOPHILIZED, FOR SOLUTION INTRAMUSCULAR; INTRAVENOUS ONCE
Status: CANCELLED
Start: 2023-10-24 | End: 2023-10-24

## 2023-10-23 RX ORDER — METHYLPREDNISOLONE SODIUM SUCCINATE 40 MG/ML
40 INJECTION, POWDER, LYOPHILIZED, FOR SOLUTION INTRAMUSCULAR; INTRAVENOUS ONCE
Status: COMPLETED | OUTPATIENT
Start: 2023-10-23 | End: 2023-10-23

## 2023-10-23 RX ORDER — NEEDLES, DISPOSABLE 25GX5/8"
NEEDLE, DISPOSABLE MISCELLANEOUS WEEKLY
Qty: 100 EACH | Refills: 0 | Status: SHIPPED | OUTPATIENT
Start: 2023-10-23

## 2023-10-23 RX ORDER — METHOTREXATE 25 MG/ML
25 INJECTION, SOLUTION INTRA-ARTERIAL; INTRAMUSCULAR; INTRAVENOUS WEEKLY
Qty: 4 ML | Refills: 10 | Status: SHIPPED | OUTPATIENT
Start: 2023-10-23

## 2023-10-23 RX ADMIN — SODIUM CHLORIDE 1000 ML: 0.9 INJECTION, SOLUTION INTRAVENOUS at 13:06

## 2023-10-23 RX ADMIN — FAMOTIDINE 40 MG: 10 INJECTION, SOLUTION INTRAVENOUS at 13:09

## 2023-10-23 RX ADMIN — DIPHENHYDRAMINE HYDROCHLORIDE 50 MG: 50 INJECTION, SOLUTION INTRAMUSCULAR; INTRAVENOUS at 14:02

## 2023-10-23 RX ADMIN — METHYLPREDNISOLONE SODIUM SUCCINATE 40 MG: 40 INJECTION, POWDER, FOR SOLUTION INTRAMUSCULAR; INTRAVENOUS at 14:50

## 2023-10-24 ENCOUNTER — TELEPHONE (OUTPATIENT)
Dept: GASTROENTEROLOGY | Facility: CLINIC | Age: 48
End: 2023-10-24

## 2023-10-24 ENCOUNTER — HOSPITAL ENCOUNTER (OUTPATIENT)
Dept: INFUSION CENTER | Facility: HOSPITAL | Age: 48
Discharge: HOME | End: 2023-10-24
Payer: COMMERCIAL

## 2023-10-24 ENCOUNTER — TELEPHONE (OUTPATIENT)
Age: 48
End: 2023-10-24

## 2023-10-24 VITALS
HEART RATE: 101 BPM | DIASTOLIC BLOOD PRESSURE: 65 MMHG | TEMPERATURE: 96 F | SYSTOLIC BLOOD PRESSURE: 138 MMHG | RESPIRATION RATE: 17 BRPM

## 2023-10-24 DIAGNOSIS — R63.0 ANOREXIA: ICD-10-CM

## 2023-10-24 DIAGNOSIS — E44.1 MILD PROTEIN-CALORIE MALNUTRITION (HCC): ICD-10-CM

## 2023-10-24 DIAGNOSIS — R11.10 VOMITING AND DIARRHEA: Primary | ICD-10-CM

## 2023-10-24 DIAGNOSIS — R19.7 VOMITING AND DIARRHEA: Primary | ICD-10-CM

## 2023-10-24 LAB — ELASTASE PANC STL-MCNT: 445 UG ELAST./G

## 2023-10-24 PROCEDURE — 96375 TX/PRO/DX INJ NEW DRUG ADDON: CPT

## 2023-10-24 PROCEDURE — 96367 TX/PROPH/DG ADDL SEQ IV INF: CPT

## 2023-10-24 PROCEDURE — 96361 HYDRATE IV INFUSION ADD-ON: CPT

## 2023-10-24 PROCEDURE — 96365 THER/PROPH/DIAG IV INF INIT: CPT

## 2023-10-24 RX ORDER — METHYLPREDNISOLONE SODIUM SUCCINATE 40 MG/ML
40 INJECTION, POWDER, LYOPHILIZED, FOR SOLUTION INTRAMUSCULAR; INTRAVENOUS ONCE
Status: CANCELLED
Start: 2023-10-25 | End: 2023-10-25

## 2023-10-24 RX ORDER — METHYLPREDNISOLONE SODIUM SUCCINATE 40 MG/ML
40 INJECTION, POWDER, LYOPHILIZED, FOR SOLUTION INTRAMUSCULAR; INTRAVENOUS ONCE
Status: COMPLETED | OUTPATIENT
Start: 2023-10-24 | End: 2023-10-24

## 2023-10-24 RX ADMIN — FAMOTIDINE 40 MG: 10 INJECTION, SOLUTION INTRAVENOUS at 12:19

## 2023-10-24 RX ADMIN — DIPHENHYDRAMINE HYDROCHLORIDE 50 MG: 50 INJECTION, SOLUTION INTRAMUSCULAR; INTRAVENOUS at 11:14

## 2023-10-24 RX ADMIN — SODIUM CHLORIDE 1000 ML: 0.9 INJECTION, SOLUTION INTRAVENOUS at 10:59

## 2023-10-24 RX ADMIN — METHYLPREDNISOLONE SODIUM SUCCINATE 40 MG: 40 INJECTION, POWDER, FOR SOLUTION INTRAMUSCULAR; INTRAVENOUS at 12:49

## 2023-10-24 NOTE — TELEPHONE ENCOUNTER
Pt calling from [facility] stating an order for methotrexate was to be placed and there isn't one. I called [facility] office clinical and they transferred pt to a nurse for this.

## 2023-10-24 NOTE — TELEPHONE ENCOUNTER
Hi,    Can we see if we can get her approved for IM methotrexate to be administered at the infusion center?     Thank you

## 2023-10-24 NOTE — TELEPHONE ENCOUNTER
Transfer from Call Center. Pt was under impression methotrexate would be administered at infusion with hydration and electrolyte infusions. Relayed methotrexate is typically administered SubQ at home once a week as [facility] Skye Daniels. Pt declining at home injections due to hx of allergies and package contamination. Will forward to Dr. Skye Daniels.

## 2023-10-24 NOTE — TELEPHONE ENCOUNTER
I spoke with the patient and apologized for the miscommunication. Pt was always aware methotrexate medication would be given Subq and IM and requesting injection to be given at our infusion.  See other encounter by Dr. Sara Smith requesting IBD team to look into insurance coverage for this request.

## 2023-10-24 NOTE — TELEPHONE ENCOUNTER
Hi,    I had informed the patient that we would not give IV methotrexate only injections.     Thank you

## 2023-10-25 ENCOUNTER — HOSPITAL ENCOUNTER (OUTPATIENT)
Dept: INFUSION CENTER | Facility: HOSPITAL | Age: 48
Discharge: HOME | End: 2023-10-25
Attending: INTERNAL MEDICINE
Payer: COMMERCIAL

## 2023-10-25 VITALS
RESPIRATION RATE: 17 BRPM | DIASTOLIC BLOOD PRESSURE: 85 MMHG | SYSTOLIC BLOOD PRESSURE: 127 MMHG | TEMPERATURE: 97.7 F | HEART RATE: 85 BPM

## 2023-10-25 DIAGNOSIS — R19.7 VOMITING AND DIARRHEA: Primary | ICD-10-CM

## 2023-10-25 DIAGNOSIS — R11.10 VOMITING AND DIARRHEA: Primary | ICD-10-CM

## 2023-10-25 DIAGNOSIS — E44.1 MILD PROTEIN-CALORIE MALNUTRITION (HCC): ICD-10-CM

## 2023-10-25 DIAGNOSIS — R63.0 ANOREXIA: ICD-10-CM

## 2023-10-25 PROCEDURE — 96365 THER/PROPH/DIAG IV INF INIT: CPT

## 2023-10-25 PROCEDURE — 96361 HYDRATE IV INFUSION ADD-ON: CPT

## 2023-10-25 PROCEDURE — 96375 TX/PRO/DX INJ NEW DRUG ADDON: CPT

## 2023-10-25 PROCEDURE — 96367 TX/PROPH/DG ADDL SEQ IV INF: CPT

## 2023-10-25 RX ORDER — METHYLPREDNISOLONE SODIUM SUCCINATE 40 MG/ML
40 INJECTION, POWDER, LYOPHILIZED, FOR SOLUTION INTRAMUSCULAR; INTRAVENOUS ONCE
Status: COMPLETED | OUTPATIENT
Start: 2023-10-25 | End: 2023-10-25

## 2023-10-25 RX ORDER — METHYLPREDNISOLONE SODIUM SUCCINATE 40 MG/ML
40 INJECTION, POWDER, LYOPHILIZED, FOR SOLUTION INTRAMUSCULAR; INTRAVENOUS ONCE
Status: CANCELLED
Start: 2023-10-30 | End: 2023-10-26

## 2023-10-25 RX ADMIN — SODIUM CHLORIDE 1000 ML: 0.9 INJECTION, SOLUTION INTRAVENOUS at 14:21

## 2023-10-25 RX ADMIN — DIPHENHYDRAMINE HYDROCHLORIDE 50 MG: 50 INJECTION, SOLUTION INTRAMUSCULAR; INTRAVENOUS at 15:02

## 2023-10-25 RX ADMIN — FAMOTIDINE 40 MG: 10 INJECTION INTRAVENOUS at 14:21

## 2023-10-25 RX ADMIN — METHYLPREDNISOLONE SODIUM SUCCINATE 40 MG: 40 INJECTION, POWDER, FOR SOLUTION INTRAMUSCULAR; INTRAVENOUS at 16:06

## 2023-10-25 NOTE — TELEPHONE ENCOUNTER
Methotrexate 50mg/2mL vial: Inject 25mg/1mL IM q 7 days. Is this the dose that you would like? Also I don't think there is a way to add an additional order to her therapy plan at the infusion center other than a paper order.

## 2023-10-30 ENCOUNTER — HOSPITAL ENCOUNTER (OUTPATIENT)
Dept: INFUSION CENTER | Facility: HOSPITAL | Age: 48
Discharge: HOME | End: 2023-10-30
Payer: COMMERCIAL

## 2023-10-30 VITALS
DIASTOLIC BLOOD PRESSURE: 71 MMHG | SYSTOLIC BLOOD PRESSURE: 116 MMHG | TEMPERATURE: 97.5 F | HEART RATE: 91 BPM | RESPIRATION RATE: 17 BRPM

## 2023-10-30 DIAGNOSIS — R11.10 VOMITING AND DIARRHEA: Primary | ICD-10-CM

## 2023-10-30 DIAGNOSIS — R19.7 VOMITING AND DIARRHEA: Primary | ICD-10-CM

## 2023-10-30 DIAGNOSIS — E44.1 MILD PROTEIN-CALORIE MALNUTRITION (HCC): ICD-10-CM

## 2023-10-30 DIAGNOSIS — R63.0 ANOREXIA: ICD-10-CM

## 2023-10-30 PROCEDURE — 96361 HYDRATE IV INFUSION ADD-ON: CPT

## 2023-10-30 PROCEDURE — 96367 TX/PROPH/DG ADDL SEQ IV INF: CPT

## 2023-10-30 PROCEDURE — 96375 TX/PRO/DX INJ NEW DRUG ADDON: CPT

## 2023-10-30 PROCEDURE — 96365 THER/PROPH/DIAG IV INF INIT: CPT

## 2023-10-30 RX ORDER — METHYLPREDNISOLONE SODIUM SUCCINATE 40 MG/ML
40 INJECTION, POWDER, LYOPHILIZED, FOR SOLUTION INTRAMUSCULAR; INTRAVENOUS ONCE
Status: CANCELLED
Start: 2023-11-01 | End: 2023-10-31

## 2023-10-30 RX ORDER — METHYLPREDNISOLONE SODIUM SUCCINATE 40 MG/ML
40 INJECTION, POWDER, LYOPHILIZED, FOR SOLUTION INTRAMUSCULAR; INTRAVENOUS ONCE
Status: COMPLETED | OUTPATIENT
Start: 2023-10-30 | End: 2023-10-30

## 2023-10-30 RX ADMIN — DIPHENHYDRAMINE HYDROCHLORIDE 50 MG: 50 INJECTION, SOLUTION INTRAMUSCULAR; INTRAVENOUS at 12:50

## 2023-10-30 RX ADMIN — SODIUM CHLORIDE 1000 ML: 0.9 INJECTION, SOLUTION INTRAVENOUS at 12:33

## 2023-10-30 RX ADMIN — METHYLPREDNISOLONE SODIUM SUCCINATE 40 MG: 40 INJECTION, POWDER, FOR SOLUTION INTRAMUSCULAR; INTRAVENOUS at 14:23

## 2023-10-30 RX ADMIN — FAMOTIDINE 40 MG: 10 INJECTION, SOLUTION INTRAVENOUS at 13:28

## 2023-10-31 ENCOUNTER — OFFICE VISIT (OUTPATIENT)
Dept: PSYCHIATRY | Facility: CLINIC | Age: 48
End: 2023-10-31
Payer: COMMERCIAL

## 2023-10-31 ENCOUNTER — TELEPHONE (OUTPATIENT)
Dept: GASTROENTEROLOGY | Facility: CLINIC | Age: 48
End: 2023-10-31

## 2023-10-31 DIAGNOSIS — L50.1 CHRONIC IDIOPATHIC URTICARIA: ICD-10-CM

## 2023-10-31 DIAGNOSIS — F41.1 GENERALIZED ANXIETY DISORDER: Primary | ICD-10-CM

## 2023-10-31 DIAGNOSIS — F33.41 RECURRENT MAJOR DEPRESSIVE DISORDER, IN PARTIAL REMISSION (HCC): ICD-10-CM

## 2023-10-31 PROCEDURE — 99214 OFFICE O/P EST MOD 30 MIN: CPT | Performed by: STUDENT IN AN ORGANIZED HEALTH CARE EDUCATION/TRAINING PROGRAM

## 2023-10-31 PROCEDURE — 90833 PSYTX W PT W E/M 30 MIN: CPT | Performed by: STUDENT IN AN ORGANIZED HEALTH CARE EDUCATION/TRAINING PROGRAM

## 2023-10-31 RX ORDER — MIRTAZAPINE 15 MG/1
15 TABLET, FILM COATED ORAL
Qty: 30 TABLET | Refills: 1 | Status: SHIPPED | OUTPATIENT
Start: 2023-10-31

## 2023-10-31 NOTE — TELEPHONE ENCOUNTER
10/31 no auth required for MTX. She can start it whenenver, she is at infusion center several times a week. Were you able to put the order in the therapy plan? Ill call the infusion center so they know its to start now.

## 2023-10-31 NOTE — TELEPHONE ENCOUNTER
Hi,    1) Yes, I would like her to receive the 3rd Skyrizi infusion. 2) Continue Skyrizi (in addition to the methotrexate)  3) Start methotrexate as soon as we can  4) okay to skip MTX    Thank you!

## 2023-10-31 NOTE — TELEPHONE ENCOUNTER
Dr. Mindy Patten I spoke with the infusion center and we need some clarifications on:  Should patient receive 3rd Skyrizi infusion? If yes, after the 3rd Skyrizi infusion is the plan to continue with OBI at standard dosing OR dc Skyrizi and proceed with MTX? If she is to proceed with OBI would this be at the infusion center? When should the patient start on MTX? Patient is going away the week of Thanksgiving, okay to skip a week of MTX? Thank you for your advisement!

## 2023-11-01 ENCOUNTER — HOSPITAL ENCOUNTER (OUTPATIENT)
Dept: INFUSION CENTER | Facility: HOSPITAL | Age: 48
Discharge: HOME | End: 2023-11-01
Payer: COMMERCIAL

## 2023-11-01 VITALS
DIASTOLIC BLOOD PRESSURE: 86 MMHG | TEMPERATURE: 97.2 F | SYSTOLIC BLOOD PRESSURE: 125 MMHG | HEART RATE: 87 BPM | RESPIRATION RATE: 17 BRPM

## 2023-11-01 DIAGNOSIS — R63.0 ANOREXIA: ICD-10-CM

## 2023-11-01 DIAGNOSIS — E44.1 MILD PROTEIN-CALORIE MALNUTRITION (HCC): ICD-10-CM

## 2023-11-01 DIAGNOSIS — R19.7 VOMITING AND DIARRHEA: Primary | ICD-10-CM

## 2023-11-01 DIAGNOSIS — R11.10 VOMITING AND DIARRHEA: Primary | ICD-10-CM

## 2023-11-01 PROCEDURE — 96361 HYDRATE IV INFUSION ADD-ON: CPT

## 2023-11-01 PROCEDURE — 96365 THER/PROPH/DIAG IV INF INIT: CPT

## 2023-11-01 PROCEDURE — 96367 TX/PROPH/DG ADDL SEQ IV INF: CPT

## 2023-11-01 PROCEDURE — 96375 TX/PRO/DX INJ NEW DRUG ADDON: CPT

## 2023-11-01 RX ORDER — METHYLPREDNISOLONE SODIUM SUCCINATE 40 MG/ML
40 INJECTION, POWDER, LYOPHILIZED, FOR SOLUTION INTRAMUSCULAR; INTRAVENOUS ONCE
Status: CANCELLED
Start: 2023-11-03 | End: 2023-11-02

## 2023-11-01 RX ORDER — METHOTREXATE 25 MG/ML
25 INJECTION, SOLUTION INTRA-ARTERIAL; INTRAMUSCULAR; INTRAVENOUS WEEKLY
Qty: 4 ML | Refills: 10 | Status: SHIPPED | OUTPATIENT
Start: 2023-11-01

## 2023-11-01 RX ORDER — METHYLPREDNISOLONE SODIUM SUCCINATE 40 MG/ML
40 INJECTION, POWDER, LYOPHILIZED, FOR SOLUTION INTRAMUSCULAR; INTRAVENOUS ONCE
Status: COMPLETED | OUTPATIENT
Start: 2023-11-01 | End: 2023-11-01

## 2023-11-01 RX ADMIN — METHYLPREDNISOLONE SODIUM SUCCINATE 40 MG: 40 INJECTION, POWDER, FOR SOLUTION INTRAMUSCULAR; INTRAVENOUS at 12:06

## 2023-11-01 RX ADMIN — DIPHENHYDRAMINE HYDROCHLORIDE 50 MG: 50 INJECTION, SOLUTION INTRAMUSCULAR; INTRAVENOUS at 10:54

## 2023-11-01 RX ADMIN — SODIUM CHLORIDE 1000 ML: 0.9 INJECTION, SOLUTION INTRAVENOUS at 10:00

## 2023-11-01 RX ADMIN — FAMOTIDINE 40 MG: 10 INJECTION, SOLUTION INTRAVENOUS at 11:30

## 2023-11-01 NOTE — TELEPHONE ENCOUNTER
It is not letting me order it any other way. I believe her last Skyrizi infusion is next week and then we can d/c that plan and add methotrexate. I am not sure any other way to do it. The other option is to delete her hydration plan and then maybe put in a primary MTX order and add hydration to it.      Thank you

## 2023-11-01 NOTE — TELEPHONE ENCOUNTER
Spoke to the infusion center, they couldn't give methotrexate today as the rx in chart was sent to [facility]. Can you please D/c that rx and put a new order in for the methotrexate under the hydration order but I think it needs to say IP for inpatient so that the infusion center can give it Friday.

## 2023-11-02 ENCOUNTER — TELEPHONE (OUTPATIENT)
Age: 48
End: 2023-11-02

## 2023-11-02 ENCOUNTER — HOSPITAL ENCOUNTER (OUTPATIENT)
Dept: INFUSION CENTER | Facility: HOSPITAL | Age: 48
Discharge: HOME | End: 2023-11-02
Payer: COMMERCIAL

## 2023-11-02 ENCOUNTER — TELEPHONE (OUTPATIENT)
Dept: FAMILY MEDICINE CLINIC | Facility: CLINIC | Age: 48
End: 2023-11-02

## 2023-11-02 VITALS
DIASTOLIC BLOOD PRESSURE: 79 MMHG | SYSTOLIC BLOOD PRESSURE: 120 MMHG | TEMPERATURE: 97.7 F | RESPIRATION RATE: 17 BRPM | HEART RATE: 101 BPM

## 2023-11-02 DIAGNOSIS — L50.1 CHRONIC IDIOPATHIC URTICARIA: Primary | ICD-10-CM

## 2023-11-02 PROCEDURE — 96372 THER/PROPH/DIAG INJ SC/IM: CPT

## 2023-11-02 RX ORDER — EPINEPHRINE 1 MG/ML
0.3 INJECTION, SOLUTION, CONCENTRATE INTRAVENOUS
OUTPATIENT
Start: 2023-11-29

## 2023-11-02 RX ORDER — EPINEPHRINE 1 MG/ML
0.3 INJECTION, SOLUTION, CONCENTRATE INTRAVENOUS
Status: DISCONTINUED | OUTPATIENT
Start: 2023-11-02 | End: 2023-11-05 | Stop reason: HOSPADM

## 2023-11-02 RX ADMIN — OMALIZUMAB 300 MG: 150 INJECTION, SOLUTION SUBCUTANEOUS at 10:52

## 2023-11-02 NOTE — TELEPHONE ENCOUNTER
Hi, this is Nannette Metzger. YOB: 1975. I'm calling regarding my order for my Xolair injections. Doctor Severino Rivera took over my care for these injections last year and should have been submitting an approval for 17 injections from 10/22 to 10/23 and that has now screwed up me getting my injections this week and Doctor Carloz Lopez taking over and not understanding. Not being able to find paperwork would have come from Doctor Ezekiel Shannon on 9/26, 22 dating that I've been getting the solar injections every three weeks and should continue as such moving forward. I was due last week, they would not give it to me. They thought they would be able to get it this week. I was not able to get it this week, so now I'm going on 2 weeks behind schedule. I need to get this resolved so if Elizabeth could please give me a call back. She was the one who took care of this. Last year 9/26/22 were transferring me to the person who's responsible, Doctor Rhodes's office to submit for this year for those injections. Again Nannette Metzger birth date is [birth year] and I do not have a direct number for Doctor Carloz Lopez. This is the number that they gave me, so please call me back. Thank you.

## 2023-11-02 NOTE — TELEPHONE ENCOUNTER
Pt under care of: Poppy Washington     Pt calling due to:    Patient scheduled on 11/21/23 with Cristina Cho for post op from [facility], mini TURBT with fulgeration (Bladder). She needs to cancel apt due to family member having emergency surgery out of state. She will be back on 11/26/23. I do not see any apts for a few months out with . I did see some openings in December with Sohail Smith in [facility] which is a closer office for her but wasn't sure if patient was able to see [facility] for this kind of visit. Please review and call patient back.      Pt can be reached at: [phone]

## 2023-11-02 NOTE — PSYCH
MEDICATION MANAGEMENT NOTE        [facility] [address] [facility]      Name and Date of Birth:  Tommy Bullock 46 y.o. 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 She is currently prescribed trazodone 50mg qHS. During interview today, Celia Ruiz states that she is feeling "okay ". States that she is still very pressured about her health issues, and feels that she is not treated appropriately at times. Complains about previous ER visit when she was "dismissed because of my mental health ". States that she has been working with her gastroenterologist on her irritable bowel syndrome, and has been getting infusions. States that she is still struggling with nausea and has difficulty eating. She begins crying, stating that this makes her feel very anxious. States she is still having trouble sleeping, and does not feel trazodone is helping. States she has successfully discontinued Zoloft and Abilify without any worsening depression, but states that she still continues to feel anxious. States that she would like to try medication if possible, but does not want to have side effects. We reviewed her chart, particularly her previous hospitalization earlier this year. States that she believes she tried mirtazapine, which we confirmed, but could not find a reason why it was discontinued. She agrees to try this again.   She denies thoughts to herself or anyone else, denies any hallucinations. States that her  continues to be supportive. Current Rating Scores:   None completed today. Psychiatric Review Of Systems:    Appetite: no, no change  Adverse eating: no  Weight changes: no  Insomnia/sleeplessness: yes  Fatigue/anergy: yes  Anhedonia/lack of interest: yes  Attention/concentration: no  Psychomotor agitation/retardation: no  Somatic symptoms: yes, nausea  Anxiety/panic attack: worrying  Kendal/hypomania: no  Hopelessness/helplessness/worthlessness: no  Self-injurious behavior/high-risk behavior: no  Suicidal ideation: no  Homicidal ideation: no  Auditory hallucinations: no  Visual hallucinations: no  Other perceptual disturbances: no  Delusional thinking: no  Obsessive/compulsive symptoms: no    Review Of Systems:      Constitutional feeling poorly, feeling tired, and as noted in HPI   ENT negative   Cardiovascular negative   Respiratory negative   Gastrointestinal abdominal discomfort, abdominal cramps, and as noted in HPI   Genitourinary negative   Musculoskeletal negative   Integumentary negative   Neurological negative   Endocrine negative   Other Symptoms none, all other systems are negative     History Review: The following portions of the patient's history were reviewed and updated as appropriate: allergies, current medications, past family history, past medical history, past social history, past surgical history and problem list..         OBJECTIVE:     Vital signs in last 24 hours: There were no vitals filed for this visit.     Mental Status Evaluation:    Appearance age appropriate, casually dressed   Behavior cooperative, appears anxious   Speech normal rate, normal volume, normal pitch   Mood anxious   Affect tearful   Thought Processes organized, goal directed   Associations intact associations   Thought Content no overt delusions   Perceptual Disturbances: no auditory hallucinations, no visual hallucinations   Abnormal Thoughts  Risk Potential Suicidal ideation - None  Homicidal ideation - None  Potential for aggression - No   Orientation oriented to person, place, time/date and situation   Memory recent and remote memory grossly intact   Consciousness alert and awake   Attention Span Concentration Span attention span and concentration are age appropriate   Intellect appears to be of average intelligence   Insight intact   Judgement intact   Muscle Strength and  Gait normal muscle strength and normal muscle tone, normal gait and normal balance   Motor activity no abnormal movements   Fund of Knowledge adequate knowledge of current events  adequate fund of knowledge regarding past history  adequate fund of knowledge regarding vocabulary    Pain none   Pain Scale 0       Laboratory Results: I have personally reviewed all pertinent laboratory/tests results    Recent Labs (last 2 months):   Appointment on 10/18/2023   Component Date Value    Salmonella sp PCR 10/18/2023 None Detected     Shigella sp/Enteroinvasi* 10/18/2023 None Detected     Campylobacter sp (jejuni* 10/18/2023 None Detected     Shiga toxin 1/Shiga toxi* 10/18/2023 None Detected      C.difficile toxin by PC* 10/18/2023 Negative     Calprotectin 10/18/2023 2390 (H)     Pancreatic Elastase-1 10/18/2023 445     Fat Qual Neutral, Stl 10/18/2023 Normal     Fat,Totall 10/18/2023 Normal     Rotavirus 10/18/2023 Negative    Hospital Outpatient Visit on 10/17/2023   Component Date Value    WBC 10/18/2023 12.85 (H)     RBC 10/18/2023 4.74     Hemoglobin 10/18/2023 13.9     Hematocrit 10/18/2023 43.7     MCV 10/18/2023 92     MCH 10/18/2023 29.3     MCHC 10/18/2023 31.8     RDW 10/18/2023 18.6 (H)     Platelets 33/69/7160 456 (H)     MPV 10/18/2023 8.8 (L)     Sodium 10/18/2023 135     Potassium 10/18/2023 4.4     Chloride 10/18/2023 103     CO2 10/18/2023 22     ANION GAP 10/18/2023 10     BUN 10/18/2023 15     Creatinine 10/18/2023 0.85     Glucose 10/18/2023 94     Calcium 10/18/2023 9.3     AST 10/18/2023 28     ALT 10/18/2023 26     Alkaline Phosphatase 10/18/2023 66     Total Protein 10/18/2023 7.0     Albumin 10/18/2023 3.9     Total Bilirubin 10/18/2023 0.38     eGFR 10/18/2023 80     CRP 10/18/2023 12.6 (H)    Hospital Outpatient Visit on 09/20/2023   Component Date Value    Case Report 09/20/2023                      Value:Surgical Pathology Report                         Case: [id number]                                   Authorizing Provider:  Dilma Watkins MD    Collected:           09/20/2023 0835              Ordering Location:     [facility]      Received:            09/20/2023 [facility]                                                           Pathologist:           Nikolay Kimbrough MD                                                             Specimens:   A) - Duodenum, Duodenum HX crohn's                                                                  B) - Stomach, Gastric Hx Chrohn's - Stain for mast cells                                            C) - Esophagus, Distal Esophagus  Hx Chrohn's - Stain for mast cells                                D) - Esophagus, Proximal Esophagus  Hx Chrohn's - Stain for mast cells                              E) - Large Intestine, Left/Descending Colon, Descending colon - hx crohn's                                                    F) - Large Intestine, Sigmoid Colon, Sigmoid colon - hx crohn's                                     G) - Rectum, Rectum - hx crohn's                                                           Addendum 09/20/2023                      Value: This result contains rich text formatting which cannot be displayed here. Final Diagnosis 09/20/2023                      Value: This result contains rich text formatting which cannot be displayed here. Additional Information 09/20/2023                      Value: This result contains rich text formatting which cannot be displayed here. Gross Description 09/20/2023                      Value: This result contains rich text formatting which cannot be displayed here. Appointment on 09/02/2023   Component Date Value    WBC 09/02/2023 7.71     RBC 09/02/2023 4.69     Hemoglobin 09/02/2023 13.5     Hematocrit 09/02/2023 43.2     MCV 09/02/2023 92     MCH 09/02/2023 28.8     MCHC 09/02/2023 31.3 (L)     RDW 09/02/2023 14.7     MPV 09/02/2023 9.7     Platelets 66/58/8286 492 (H)     nRBC 09/02/2023 0     Neutrophils Relative 09/02/2023 50     Immat GRANS % 09/02/2023 0     Lymphocytes Relative 09/02/2023 35     Monocytes Relative 09/02/2023 10     Eosinophils Relative 09/02/2023 3     Basophils Relative 09/02/2023 2 (H)     Neutrophils Absolute 09/02/2023 3.90     Immature Grans Absolute 09/02/2023 0.02     Lymphocytes Absolute 09/02/2023 2.71     Monocytes Absolute 09/02/2023 0.76     Eosinophils Absolute 09/02/2023 0.20     Basophils Absolute 09/02/2023 0.12 (H)     CRP 09/02/2023 4.5 (H)     Sodium 09/02/2023 139     Potassium 09/02/2023 3.9     Chloride 09/02/2023 103     CO2 09/02/2023 28     ANION GAP 09/02/2023 8     BUN 09/02/2023 11     Creatinine 09/02/2023 0.71     Glucose, Fasting 09/02/2023 83     Calcium 09/02/2023 9.3     AST 09/02/2023 20     ALT 09/02/2023 26     Alkaline Phosphatase 09/02/2023 56     Total Protein 09/02/2023 7.1     Albumin 09/02/2023 4.1     Total Bilirubin 09/02/2023 0.42     eGFR 09/02/2023 99     Protime 09/02/2023 12.2     INR 09/02/2023 0.91     Iron Saturation 09/02/2023 11 (L)     TIBC 09/02/2023 350     Iron 09/02/2023 37 (L)     UIBC 09/02/2023 313     Ferritin 09/02/2023 15        Suicide/Homicide Risk Assessment:    The following interventions are recommended: contracts for safety at present - agrees to go to ED if feeling unsafe, contracts for safety at present - agrees to call Crisis Intervention Service if feeling unsafe.  Although patient's acute lethality risk is low, long-term/chronic lethality risk is mildly elevated in the presence of depression. At the current moment, Katia Muñoz is future-oriented, forward-thinking, and demonstrates ability to act in a self-preserving manner as evidenced by volitionally presenting to the clinic today, seeking treatment. To mitigate future risk, patient should adhere to the recommendations below, avoid alcohol/illicit substance use, utilize community-based resources and familiar support and prioritize mental health treatment. Presently, patient denies active suicidal/homicidal ideation in addition to thoughts of self-injury; contracts for safety. At conclusion of evaluation, patient is amenable to the recommendations below. Patient is amenable to calling/contacting the outpatient office including this writer if any acute adverse effects of their medication regimen arise in addition to any comments or concerns pertaining to their psychiatric management. Patient is amenable to calling/contacting crisis and/or attending to the nearest emergency department if their clinical condition deteriorates to assure their safety and stability, stating that they are able to appropriately confide in their  regarding their psychiatric state. Assessment/Plan:     Diagnoses and all orders for this visit:    Generalized anxiety disorder  -     mirtazapine (REMERON) 15 mg tablet; Take 1 tablet (15 mg total) by mouth daily at bedtime    Recurrent major depressive disorder, in partial remission ([address])     Katia Muñoz is a 20-year-old female who has a history of depression and anxiety, mostly related to her medical issues. She has been recently been hospitalized following an overdose on benzodiazepines. She participated in the partial hospitalization program has noted improvement since then. She does not appear to be in acute danger to herself or others, wants to work on her issues with her therapist.      Treatment Recommendations/Precautions:     Will start mirtazapine 15mg qHS for treatment of her anxiety and insomnia. May also help with her nausea. D/c zoloft and abilify due to noncompliance and complaints it made her feel worse. D/c trazodone as does not seem to be helping with anxiety or insomnia. Medication management every 4 weeks  Continue psychotherapy with own therapist  Aware of 24 hour and weekend coverage for urgent situations accessed by calling [facility] practice number  Patient advised to call 911 if feeling suicidal or homicidal before acting out on their thoughts and they expressed understanding. Medications Risks/Benefits      Risks, Benefits And Possible Side Effects Of Medications:    Risks, benefits, and possible side effects of medications explained to Luiz Viramontes and she verbalizes understanding and agreement for treatment. including: Risks and potential side effects of medication discussed with patient including serotonin syndrome, SIADH, worsening depression, suicidality, induction of concetta, GI upset, headaches, activation, sexual side effects, sedation, potential drug interactions, and others. Patient expressed understanding. .     Controlled Medication Discussion:     Not applicable    Psychotherapy Provided:   Individual psychotherapy provided: Yes  Counseling was provided during the session today for 16 minutes. Medication education provided to Luiz Viramontes. Discussed with Luiz Viramontes coping with health issues, medical problems, and ongoing anxiety. Importance of medication and treatment compliance reviewed with Luiz Viramontes. Reassurance and supportive therapy provided.         Treatment Plan:    Completed and signed during the session: Not applicable - Treatment Plan not due at this session    This note was not shared with the patient due to this is a psychotherapy note      Paige Roman DO 11/02/23

## 2023-11-02 NOTE — TELEPHONE ENCOUNTER
ROXY Douglas approved 10/26/2023 - 10/24/2024 for 13 visits [id number] [id number]

## 2023-11-02 NOTE — TELEPHONE ENCOUNTER
LM for patient infusion center reached out to me last week about updating xolair order. Order was updated 10/25/23. I then needed to submit auth for this which was faxed both on 10/27/23 & 10/30/23. Will get in contact with patient once I hear auth determination.  I called and spoke to insurance Monday and was informed case determination would be completed by 11/2/23

## 2023-11-03 ENCOUNTER — OFFICE VISIT (OUTPATIENT)
Dept: SLEEP CENTER | Facility: CLINIC | Age: 48
End: 2023-11-03
Payer: COMMERCIAL

## 2023-11-03 ENCOUNTER — TELEPHONE (OUTPATIENT)
Age: 48
End: 2023-11-03

## 2023-11-03 ENCOUNTER — HOSPITAL ENCOUNTER (OUTPATIENT)
Dept: INFUSION CENTER | Facility: HOSPITAL | Age: 48
End: 2023-11-03
Payer: COMMERCIAL

## 2023-11-03 VITALS
TEMPERATURE: 97.8 F | DIASTOLIC BLOOD PRESSURE: 84 MMHG | SYSTOLIC BLOOD PRESSURE: 128 MMHG | OXYGEN SATURATION: 96 % | RESPIRATION RATE: 18 BRPM | HEART RATE: 79 BPM

## 2023-11-03 VITALS
BODY MASS INDEX: 26.99 KG/M2 | OXYGEN SATURATION: 96 % | HEIGHT: 65 IN | SYSTOLIC BLOOD PRESSURE: 124 MMHG | HEART RATE: 76 BPM | DIASTOLIC BLOOD PRESSURE: 78 MMHG | WEIGHT: 162 LBS

## 2023-11-03 DIAGNOSIS — G47.01 INSOMNIA DUE TO MEDICAL CONDITION: ICD-10-CM

## 2023-11-03 DIAGNOSIS — R63.0 ANOREXIA: ICD-10-CM

## 2023-11-03 DIAGNOSIS — R11.10 VOMITING AND DIARRHEA: Primary | ICD-10-CM

## 2023-11-03 DIAGNOSIS — E44.1 MILD PROTEIN-CALORIE MALNUTRITION (HCC): ICD-10-CM

## 2023-11-03 DIAGNOSIS — R19.7 VOMITING AND DIARRHEA: Primary | ICD-10-CM

## 2023-11-03 DIAGNOSIS — G47.20 CIRCADIAN RHYTHM SLEEP DISORDER: Primary | ICD-10-CM

## 2023-11-03 PROCEDURE — 96361 HYDRATE IV INFUSION ADD-ON: CPT

## 2023-11-03 PROCEDURE — 96375 TX/PRO/DX INJ NEW DRUG ADDON: CPT

## 2023-11-03 PROCEDURE — 96367 TX/PROPH/DG ADDL SEQ IV INF: CPT

## 2023-11-03 PROCEDURE — 96365 THER/PROPH/DIAG IV INF INIT: CPT

## 2023-11-03 PROCEDURE — 99205 OFFICE O/P NEW HI 60 MIN: CPT | Performed by: NURSE PRACTITIONER

## 2023-11-03 RX ORDER — METHYLPREDNISOLONE SODIUM SUCCINATE 40 MG/ML
40 INJECTION, POWDER, LYOPHILIZED, FOR SOLUTION INTRAMUSCULAR; INTRAVENOUS ONCE
Start: 2023-11-06 | End: 2023-11-04

## 2023-11-03 RX ORDER — METHYLPREDNISOLONE SODIUM SUCCINATE 40 MG/ML
40 INJECTION, POWDER, LYOPHILIZED, FOR SOLUTION INTRAMUSCULAR; INTRAVENOUS ONCE
Status: COMPLETED | OUTPATIENT
Start: 2023-11-03 | End: 2023-11-03

## 2023-11-03 RX ADMIN — METHYLPREDNISOLONE SODIUM SUCCINATE 40 MG: 40 INJECTION, POWDER, FOR SOLUTION INTRAMUSCULAR; INTRAVENOUS at 10:10

## 2023-11-03 RX ADMIN — FAMOTIDINE 40 MG: 10 INJECTION, SOLUTION INTRAVENOUS at 09:40

## 2023-11-03 RX ADMIN — SODIUM CHLORIDE 1000 ML: 0.9 INJECTION, SOLUTION INTRAVENOUS at 08:53

## 2023-11-03 RX ADMIN — DIPHENHYDRAMINE HYDROCHLORIDE 50 MG: 50 INJECTION, SOLUTION INTRAMUSCULAR; INTRAVENOUS at 09:08

## 2023-11-03 NOTE — PROGRESS NOTES
Consultation - [address], 1975, MRN: [id number]    11/3/2023        Reason for Consult / Principal Problem:  Restless Legs Syndrome  Insomnia  Advanced Sleep-Wake Phase Disorder       Thank you for the opportunity of participating in the evaluation and care of this patient in the Sleep Clinic at [address] [place] [address]. Subjective:     HPI: Kit Kent is a 46y.o. year old female. She presents for a consultation regarding insomnia and restless legs syndrome. She reports, "I don't sleep. I suffer from insomnia and have since high school."  She is a light sleeper and states that she can't fall back to sleep once she wakes up. She is a prior patient of Dr. Marcos Guerrero, last office visit 2020. At that time, she was followed for insomnia and treated with temazepam 30mg. She states that her father and paternal grandmother have had insomnia as well. She was treated with several medications in the past.  She has multiple allergies to medications and medication binders/components. She took lorazepam for anxiety. She took temazepam in the past to help her sleep. She felt that these medications helped. It is noted in her July hospital admission documentation that she was misusing benzodiazepines for 2-3 weeks prior to the admission. It is noted that she is to avoid benzo use. She follows with psychiatry. She took [place] in the past and had complex sleep behaviors, including walking in her sleep. She started mirtazapine 15mg 2 days ago. She has not noticed any response. She took trazodone 50mg at bedtime, but discontinued. She felt it didn't help and it was aggravating her Crohn's, causing her to wake up during the night and felt it was worsening. She is taking melatonin 3mg, remeron, singulair at 8:00pm  She has never been diagnosed with bipolar disorder. She has a history of seizures.   She reports several different types including psychogenic, some observed, some not. Follow up with an epileptologist was mentioned in July hospital admission, but not completed as of yet. She has mast cell activation syndrome. She reports that when her mast cells activate/flare, she has worsening of ability to sleep. She has a history of CHF, stemming from an episode of sepsis in 2017. Most recent ECHO with EF of 60-65% in 2022. She follows with cardiology. She has a history of MS and reports that she has several brain lesions and follows with neurology. 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 She reports symptoms as muscle weakness, speech and memory issues. Also reports pain. She has had vision loss upon sitting up. She recently had 4 iron infusions and has follow up with hematology. Pertinent symptoms:  [place] 2, chronic or am headache, unrefreshing sleep, and RLS      Sleep Study Results:  [facility] in 2019 for a sleep study. Sleep efficiency was 80.3% with 19.6% N3 sleep and 12.6% REM sleep. AHI was 0.2, including one central apnea. Minimum oxygen saturation was 92% with minimal supine sleep. She reports that she only sleeps laterally. There has been a 22 lb weight gain since the time of her study. Employment:  She is not currently working. She is disabled.   She worked day shift hours in the past.    Sleep Schedule:       Bedtime:  10:00pm, takes mirtazapine, singulair and melatonin 3mg at 8:00pm      Latency:  1-5 hours      Wakeup time:  6:00am-10:00am, prefers 8-9:00am    Awakenings:       Frequency:  gets up and out of bed 3-5 times per night, tosses and turns      Causes:  , cat, noises, unknown causes      Duration:  she has a difficult time returning to sleep, rests but feels aware of surroundings    Daytime Sleepiness / Inappropriate Sleep:       Most severe:  she feels fatigued, but not sleepy       Naps :  she does not take naps      Inappropriate drowsiness / sleep:  she does not doze with sedentary activities    Snoring:  occasionally snores while she is on her back, she is uncomfortable if she lays on her back, due to past spinal issues    Apnea:  no witnessed apnea    Change in Weight:  she gained 22 lbs since the time of her sleep study in 2019. She reports fluctuations in her weight with use of prednisone for mast cell activation syndrome. Restless Leg Syndrome:  she notices clinical symptoms consistent with this diagnosis including pain in her legs that causes her to move around to find a comfortable position. Sensation can be pain or paresthesias. Weight of sheets can bother her legs. Other Complaints:   No reports of sleep walking, sleep talking, sleep paralysis or hallucinations surrounding sleep. She wakes up with headaches frequently and feels these are related to Crohn's. She reports bruxism with clenching that has increased over the past 6 months. She used an oral appliance in the past for TMJ but hasn't used any recently  She wakes up feeling hot and diaphoretic. She reports seizures over the past 6 months. 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 She is not taking any seizure medication. Social History:      Caffeine:  occasional soda or iced tea       Tobacco:   reports that she has never smoked. She has never used smokeless tobacco.     E-cig/Vaping:    E-Cigarette/Vaping    E-Cigarette Use Never User       E-Cigarette/Vaping Substances    Nicotine No     THC No     CBD No     Flavoring No     Other No     Unknown No          Alcohol:   reports no history of alcohol use. none      Drugs:   reports no history of drug use.        The review of systems and following portions of the patient's history were reviewed and updated as appropriate: allergies, current medications, past family history, past medical history, past social history, past surgical history and problem list.        Objective:       Vitals:    11/03/23 1208   BP: 124/78   Pulse: 76   SpO2: 96%   Weight: 73.5 kg (162 lb)   Height: 5' 5.3" (1.659 m)     Body mass index is 26.71 kg/m². Neck Circumference: 14  [place] Sleepiness Scale:  Total score: 2      Current Outpatient Medications:     B-D 3CC LUER-SHAMEKA SYR 25GX1" 25G X 1" 3 ML MISC, , Disp: , Rfl:     butalbital-acetaminophen-caffeine (FIORICET,ESGIC) -40 mg per tablet, Take 1 tablet by mouth every 8 (eight) hours as needed for migraine, Disp: 20 tablet, Rfl: 1    cetirizine (ZyrTEC) 10 mg tablet, Take 20 mg by mouth daily, Disp: , Rfl:     cyanocobalamin (VITAMIN B-12) 100 mcg tablet, Take by mouth daily, Disp: , Rfl:     diclofenac (VOLTAREN) 75 mg EC tablet, , Disp: , Rfl:     EpiPen 2-Malachi 0.3 MG/0.3ML SOAJ, INJECT 1 PEN INTO THE MUSCLE AS NEEDED FOR ANAPHALAXIS, Disp: , Rfl:     famotidine (PEPCID) 40 MG tablet, Take 0.5 tablets (20 mg total) by mouth daily, Disp: 90 tablet, Rfl: 0    folic acid (KP Folic Acid) 1 mg tablet, Take 1 tablet (1 mg total) by mouth daily, Disp: 30 tablet, Rfl: 3    hydrOXYzine HCL (ATARAX) 25 mg tablet, , Disp: , Rfl:     Lactobacillus (PROBIOTIC ACIDOPHILUS PO), Take by mouth, Disp: , Rfl:     Lactobacillus-Inulin ([facility]) CAPS, Take 200 mg by mouth daily, Disp: , Rfl:     levalbuterol (XOPENEX HFA) 45 mcg/act inhaler, Inhale 1-2 puffs every 4 (four) hours as needed for shortness of breath, Disp: 45 g, Rfl: 2    levothyroxine 100 mcg tablet, Take 100 mcg by mouth every other day, Disp: , Rfl:     liothyronine (CYTOMEL) 5 mcg tablet, Take 1 tablet (5 mcg total) by mouth daily Do not start before June 14, 2023., Disp: 30 tablet, Rfl: 0    melatonin 3 mg, Take 1 tablet (3 mg total) by mouth daily at bedtime, Disp: 30 tablet, Rfl: 0    methotrexate 50 MG/2ML injection, Inject 1 mL (25 mg total) under the skin once a week, Disp: 4 mL, Rfl: 10    methotrexate 50 MG/2ML injection, Inject 1 mL (25 mg total) under the skin once a week, Disp: 4 mL, Rfl: 10    mirtazapine (REMERON) 15 mg tablet, Take 1 tablet (15 mg total) by mouth daily at bedtime, Disp: 30 tablet, Rfl: 1    montelukast (SINGULAIR) 10 mg tablet, Take 1 tablet (10 mg total) by mouth daily at bedtime, Disp: , Rfl: 0    NEEDLE, DISP, 27 G (B-D DISP NEEDLE 93FA5-0/4") 27G X 1-1/4" MISC, Use once a week, Disp: 100 each, Rfl: 0    NON FORMULARY, immunoglobin sq 6g 30 ml once a week, Disp: , Rfl:     omalizumab (XOLAIR) 150 mg, Inject 2.4 mL (300 mg total) under the skin every 28 days, Disp: 1 each, Rfl: 2    pantoprazole (PROTONIX) 40 mg tablet, TAKE ONE TABLET BY MOUTH TWICE A DAY, Disp: 60 tablet, Rfl: 5    predniSONE 20 mg tablet, Take 1 tablet (20 mg total) by mouth daily Take as instructed by physician. 20 mg three times a day for 3 days, then 20 mg twice daily, Disp: 60 tablet, Rfl: 0    Riboflavin (VITAMIN B-2 PO), Take by mouth, Disp: , Rfl:     TechLite Lancets MISC, , Disp: , Rfl:     Cholecalciferol 10 MCG /0.025ML LIQD, Take 1,000 Units by mouth 2 (two) times a day (Patient not taking: Reported on 9/13/2023), Disp: 750 mL, Rfl: 1    Melatonin 3 MG SUBL, , Disp: , Rfl:     nystatin (MYCOSTATIN) 500,000 units/5 mL suspension, Swish and spit 5 mL (500,000 Units total) 4 (four) times a day (Patient not taking: Reported on 9/13/2023), Disp: 473 mL, Rfl: 0    traMADol (Ultram) 50 mg tablet, Take 1 tablet (50 mg total) by mouth every 12 (twelve) hours as needed for moderate pain or severe pain (Patient not taking: Reported on 9/14/2023), Disp: 60 tablet, Rfl: 0  No current facility-administered medications for this visit.     Physical Exam  General Appearance:   Alert, cooperative, no distress, appears stated age     Head:   Normocephalic, without obvious abnormality, atraumatic     Eyes:   Conjunctiva/corneas clear          Nose:  Nares normal, septum midline, mucosa normal, no drainage or sinus tenderness           Throat:  Lips, teeth and gums normal; tongue normal in size and midline in position; mucosa moist and redundant bilaterally, uvula partially visualized, tonsils not visualized, Mallampati class 3       Neck:  Supple, symmetrical, trachea midline, no adenopathy; no thyromegaly noted, no carotid bruit or JVD     Lungs:      Clear to auscultation bilaterally, respirations unlabored     Heart:   Regular rate and rhythm, S1 and S2 normal, no murmur, rub or gallop       Extremities:  Extremities normal, atraumatic, no cyanosis or edema       Skin:  Skin color, texture, turgor normal, no rashes or lesions       Neurologic:  No focal deficits noted. ASSESSMENT / PLAN     1. Circadian rhythm sleep disorder        2. Insomnia due to medical condition              Counseling / Coordination of Care  I have spent a total time of 60 minutes on 11/3/23 in caring for this patient including Risks and benefits of tx options, Instructions for management, Patient and family education, Importance of tx compliance, Risk factor reductions, Impressions, Counseling / Coordination of care, Documenting in the medical record, Reviewing / ordering tests, medicine, procedures  , and Obtaining or reviewing history  . Today, we discussed her past and current symptoms. We discussed past treatments. Past sleep study results were reviewed and were negative. This may need to be re-evaluated, due to weight gain, however, she is not sleeping enough hours on a consistent basis for appropriate re-evaluation at this time. Her current sleep/wake schedule was reviewed. There is large variation in her wake up time, which she feels depends on her night of sleep and when she was able to fall asleep, as well as her other health conditions. We discussed advanced phase circadian rhythm, which is likely coming into play. We discussed the importance of setting a wake up time and keeping it consistent to help with insomnia.   We discussed appropriate timing of medications, including the melatonin supplement, 9-10 hours prior to wake up time. We discussed avoiding setting a bedtime and not going to bed until she feels sleepy. Due to her many allergies to and side effects from medications, multiple health issues and past reported misuse of medication, it is recommended that she continue to work with her psychiatric provider regarding medications. I will refer her for cognitive behavior for insomnia, which would be with a separate provider specifically to work with her on sleep and sleep patterns leading to insomnia. CBT-I is first line treatment for insomnia. I requested that she keep a sleep diary until this can be arranged. She will continue with iron infusions, as needed. Restless leg symptoms may improve with ferritin level >/= to 75. We also discussed magnesium supplement at dinner time. She will schedule a follow up visit in 4 months. The following instructions have been given to the patient today:    Patient Instructions    Recommend CBT-I for insomnia  Recommend keeping your wake up time at 6:30am-7:00am.  Set an alarm to get up at that time. Take melatonin 9-10 hours before wake. Keep light low and activity levels low  Do not get into bed at a bedtime  When you start to feel sleepy, take the remeron  Go to bed when you feel sleepy  If you wake up and are not falling back to sleep get up and stay out of bed until you feel sleepy. Continue to work with your psychiatrist regarding appropriate medications  We can consider a sleep study if you get into a routine and still are tossing and turning. Continue iron infusions  You can take magnesium supplement at dinner time  Follow up 4 months  Keep a sleep diary        Nursing Support:  When: Monday through Friday 7A-5PM except holidays  Where: Our direct line is [phone]. If you are having a true emergency please call 911.   In the event that the line is busy or it is after hours please leave a voice message and we will return your call. Please speak clearly, leaving your full name, birth date, best number to reach you and the reason for your call. Medication refills: We will need the name of the medication, the dosage, the ordering provider, whether you get a 30 or 90 day refill, and the pharmacy name and address. Medications will be ordered by the provider only. Nurses cannot call in prescriptions. Please allow 7 days for medication refills. Physician requested updates: If your provider requested that you call with an update after starting medication, please be ready to provide us the medication and dosage, what time you take your medication, the time you attempt to fall asleep, time you fall asleep, when you wake up, and what time you get out of bed. Sleep Study Results: We will contact you with sleep study results and/or next steps after the physician has reviewed your testing. [facility], [address] [facility]      Portions of the record may have been created with voice recognition software. Occasional wrong word or "sound a like" substitutions may have occurred due to the inherent limitations of voice recognition software. Read the chart carefully and recognize, using context, where substitutions have occurred.

## 2023-11-03 NOTE — PATIENT INSTRUCTIONS
Recommend CBT-I for insomnia  Recommend keeping your wake up time at 6:30am-7:00am.  Set an alarm to get up at that time. Take melatonin 9-10 hours before wake. Keep light low and activity levels low  Do not get into bed at a bedtime  When you start to feel sleepy, take the remeron  Go to bed when you feel sleepy  If you wake up and are not falling back to sleep get up and stay out of bed until you feel sleepy. Continue to work with your psychiatrist regarding appropriate medications  We can consider a sleep study if you get into a routine and still are tossing and turning. Continue iron infusions  You can take magnesium supplement at dinner time  Follow up 4 months  Keep a sleep diary        Nursing Support:  When: Monday through Friday 7A-5PM except holidays  Where: Our direct line is [phone]. If you are having a true emergency please call 911. In the event that the line is busy or it is after hours please leave a voice message and we will return your call. Please speak clearly, leaving your full name, birth date, best number to reach you and the reason for your call. Medication refills: We will need the name of the medication, the dosage, the ordering provider, whether you get a 30 or 90 day refill, and the pharmacy name and address. Medications will be ordered by the provider only. Nurses cannot call in prescriptions. Please allow 7 days for medication refills. Physician requested updates: If your provider requested that you call with an update after starting medication, please be ready to provide us the medication and dosage, what time you take your medication, the time you attempt to fall asleep, time you fall asleep, when you wake up, and what time you get out of bed. Sleep Study Results: We will contact you with sleep study results and/or next steps after the physician has reviewed your testing.

## 2023-11-03 NOTE — TELEPHONE ENCOUNTER
Pt is currently at [facility] to get her methotrexate infusion. Says she was told the Methotrexate was sent to her [address] instead of the infusion center. Is asking if this can be sent since she is currently waiting.

## 2023-11-06 ENCOUNTER — TELEPHONE (OUTPATIENT)
Age: 48
End: 2023-11-06

## 2023-11-06 ENCOUNTER — HOSPITAL ENCOUNTER (OUTPATIENT)
Dept: INFUSION CENTER | Facility: HOSPITAL | Age: 48
Discharge: HOME | End: 2023-11-06
Attending: INTERNAL MEDICINE
Payer: COMMERCIAL

## 2023-11-06 VITALS
SYSTOLIC BLOOD PRESSURE: 115 MMHG | RESPIRATION RATE: 16 BRPM | TEMPERATURE: 98 F | DIASTOLIC BLOOD PRESSURE: 76 MMHG | HEART RATE: 106 BPM

## 2023-11-06 DIAGNOSIS — R63.0 ANOREXIA: ICD-10-CM

## 2023-11-06 DIAGNOSIS — R11.10 VOMITING AND DIARRHEA: Primary | ICD-10-CM

## 2023-11-06 DIAGNOSIS — R19.7 VOMITING AND DIARRHEA: Primary | ICD-10-CM

## 2023-11-06 DIAGNOSIS — E44.1 MILD PROTEIN-CALORIE MALNUTRITION (HCC): ICD-10-CM

## 2023-11-06 PROCEDURE — 96375 TX/PRO/DX INJ NEW DRUG ADDON: CPT

## 2023-11-06 PROCEDURE — 96361 HYDRATE IV INFUSION ADD-ON: CPT

## 2023-11-06 PROCEDURE — 96365 THER/PROPH/DIAG IV INF INIT: CPT

## 2023-11-06 RX ORDER — METHYLPREDNISOLONE SODIUM SUCCINATE 40 MG/ML
40 INJECTION, POWDER, LYOPHILIZED, FOR SOLUTION INTRAMUSCULAR; INTRAVENOUS ONCE
Status: CANCELLED
Start: 2023-11-07 | End: 2023-11-07

## 2023-11-06 RX ORDER — METHYLPREDNISOLONE SODIUM SUCCINATE 40 MG/ML
40 INJECTION, POWDER, LYOPHILIZED, FOR SOLUTION INTRAMUSCULAR; INTRAVENOUS ONCE
Status: COMPLETED | OUTPATIENT
Start: 2023-11-06 | End: 2023-11-06

## 2023-11-06 RX ADMIN — METHYLPREDNISOLONE SODIUM SUCCINATE 40 MG: 40 INJECTION, POWDER, FOR SOLUTION INTRAMUSCULAR; INTRAVENOUS at 12:34

## 2023-11-06 RX ADMIN — SODIUM CHLORIDE 1000 ML: 0.9 INJECTION, SOLUTION INTRAVENOUS at 11:38

## 2023-11-06 RX ADMIN — DIPHENHYDRAMINE HYDROCHLORIDE 50 MG: 50 INJECTION, SOLUTION INTRAMUSCULAR; INTRAVENOUS at 11:34

## 2023-11-06 RX ADMIN — FAMOTIDINE 40 MG: 10 INJECTION, SOLUTION INTRAVENOUS at 11:57

## 2023-11-06 NOTE — TELEPHONE ENCOUNTER
Patients GI provider:  Dr. Astrid Licea    Number to return call: ([phone]     Reason for call: Pt calling regarding order for methotrexate. Patient is requesting a phone call back to discuss. Please return patient call.

## 2023-11-07 RX ORDER — METHOTREXATE 25 MG/ML
25 INJECTION INTRA-ARTERIAL; INTRAMUSCULAR; INTRATHECAL; INTRAVENOUS ONCE
Status: DISCONTINUED | OUTPATIENT
Start: 2023-11-08 | End: 2023-11-10

## 2023-11-07 NOTE — TELEPHONE ENCOUNTER
11/7 lmovm dr Karis Cheung had to sign a paper order and it was faxed to the infusion center so pt can have it

## 2023-11-08 ENCOUNTER — HOSPITAL ENCOUNTER (OUTPATIENT)
Dept: INFUSION CENTER | Facility: HOSPITAL | Age: 48
Discharge: HOME | End: 2023-11-08
Attending: INTERNAL MEDICINE
Payer: COMMERCIAL

## 2023-11-08 VITALS
DIASTOLIC BLOOD PRESSURE: 86 MMHG | SYSTOLIC BLOOD PRESSURE: 111 MMHG | RESPIRATION RATE: 18 BRPM | HEART RATE: 99 BPM | TEMPERATURE: 96.9 F | OXYGEN SATURATION: 100 %

## 2023-11-08 DIAGNOSIS — R11.10 VOMITING AND DIARRHEA: Primary | ICD-10-CM

## 2023-11-08 DIAGNOSIS — R19.7 VOMITING AND DIARRHEA: Primary | ICD-10-CM

## 2023-11-08 DIAGNOSIS — R63.0 ANOREXIA: ICD-10-CM

## 2023-11-08 DIAGNOSIS — E44.1 MILD PROTEIN-CALORIE MALNUTRITION (HCC): ICD-10-CM

## 2023-11-08 DIAGNOSIS — K50.119 CROHN'S DISEASE OF COLON WITH COMPLICATION (HCC): ICD-10-CM

## 2023-11-08 PROCEDURE — 96368 THER/DIAG CONCURRENT INF: CPT

## 2023-11-08 PROCEDURE — 96375 TX/PRO/DX INJ NEW DRUG ADDON: CPT

## 2023-11-08 PROCEDURE — 96367 TX/PROPH/DG ADDL SEQ IV INF: CPT

## 2023-11-08 PROCEDURE — 96365 THER/PROPH/DIAG IV INF INIT: CPT

## 2023-11-08 PROCEDURE — 96361 HYDRATE IV INFUSION ADD-ON: CPT

## 2023-11-08 RX ORDER — METHYLPREDNISOLONE SODIUM SUCCINATE 40 MG/ML
40 INJECTION, POWDER, LYOPHILIZED, FOR SOLUTION INTRAMUSCULAR; INTRAVENOUS ONCE
Status: CANCELLED | OUTPATIENT
Start: 2023-12-06

## 2023-11-08 RX ORDER — DEXTROSE MONOHYDRATE 50 MG/ML
20 INJECTION, SOLUTION INTRAVENOUS ONCE
Status: CANCELLED | OUTPATIENT
Start: 2023-12-06

## 2023-11-08 RX ORDER — DEXTROSE MONOHYDRATE 50 MG/ML
20 INJECTION, SOLUTION INTRAVENOUS ONCE
Status: COMPLETED | OUTPATIENT
Start: 2023-11-08 | End: 2023-11-08

## 2023-11-08 RX ORDER — ACETAMINOPHEN 325 MG/1
650 TABLET ORAL ONCE
Status: CANCELLED | OUTPATIENT
Start: 2023-12-06

## 2023-11-08 RX ORDER — METHYLPREDNISOLONE SODIUM SUCCINATE 40 MG/ML
40 INJECTION, POWDER, LYOPHILIZED, FOR SOLUTION INTRAMUSCULAR; INTRAVENOUS ONCE
Status: CANCELLED
Start: 2023-11-10 | End: 2023-11-09

## 2023-11-08 RX ORDER — METHYLPREDNISOLONE SODIUM SUCCINATE 40 MG/ML
40 INJECTION, POWDER, LYOPHILIZED, FOR SOLUTION INTRAMUSCULAR; INTRAVENOUS ONCE
Status: DISCONTINUED | OUTPATIENT
Start: 2023-11-08 | End: 2023-11-11 | Stop reason: HOSPADM

## 2023-11-08 RX ORDER — METHYLPREDNISOLONE SODIUM SUCCINATE 40 MG/ML
40 INJECTION, POWDER, LYOPHILIZED, FOR SOLUTION INTRAMUSCULAR; INTRAVENOUS ONCE
Status: COMPLETED | OUTPATIENT
Start: 2023-11-08 | End: 2023-11-08

## 2023-11-08 RX ORDER — ACETAMINOPHEN 325 MG/1
650 TABLET ORAL ONCE
Status: COMPLETED | OUTPATIENT
Start: 2023-11-08 | End: 2023-11-08

## 2023-11-08 RX ADMIN — METHYLPREDNISOLONE SODIUM SUCCINATE 40 MG: 40 INJECTION, POWDER, FOR SOLUTION INTRAMUSCULAR; INTRAVENOUS at 12:50

## 2023-11-08 RX ADMIN — ACETAMINOPHEN 650 MG: 325 TABLET, FILM COATED ORAL at 12:48

## 2023-11-08 RX ADMIN — DEXTROSE 20 ML/HR: 5 SOLUTION INTRAVENOUS at 13:06

## 2023-11-08 RX ADMIN — DEXTROSE 600 MG: 5 SOLUTION INTRAVENOUS at 13:24

## 2023-11-08 RX ADMIN — DIPHENHYDRAMINE HYDROCHLORIDE 50 MG: 50 INJECTION, SOLUTION INTRAMUSCULAR; INTRAVENOUS at 11:20

## 2023-11-08 RX ADMIN — SODIUM CHLORIDE 1000 ML: 0.9 INJECTION, SOLUTION INTRAVENOUS at 11:10

## 2023-11-10 ENCOUNTER — HOSPITAL ENCOUNTER (OUTPATIENT)
Dept: INFUSION CENTER | Facility: HOSPITAL | Age: 48
End: 2023-11-10
Attending: INTERNAL MEDICINE
Payer: COMMERCIAL

## 2023-11-10 VITALS
RESPIRATION RATE: 16 BRPM | TEMPERATURE: 99.1 F | SYSTOLIC BLOOD PRESSURE: 112 MMHG | DIASTOLIC BLOOD PRESSURE: 83 MMHG | OXYGEN SATURATION: 98 % | HEART RATE: 111 BPM

## 2023-11-10 DIAGNOSIS — R19.7 VOMITING AND DIARRHEA: Primary | ICD-10-CM

## 2023-11-10 DIAGNOSIS — E44.1 MILD PROTEIN-CALORIE MALNUTRITION (HCC): ICD-10-CM

## 2023-11-10 DIAGNOSIS — R63.0 ANOREXIA: ICD-10-CM

## 2023-11-10 DIAGNOSIS — R11.10 VOMITING AND DIARRHEA: Primary | ICD-10-CM

## 2023-11-10 RX ORDER — METHYLPREDNISOLONE SODIUM SUCCINATE 40 MG/ML
40 INJECTION, POWDER, LYOPHILIZED, FOR SOLUTION INTRAMUSCULAR; INTRAVENOUS ONCE
Status: CANCELLED
Start: 2023-11-13 | End: 2023-11-11

## 2023-11-10 RX ORDER — METHOTREXATE 25 MG/ML
25 INJECTION INTRA-ARTERIAL; INTRAMUSCULAR; INTRATHECAL; INTRAVENOUS ONCE
Status: COMPLETED | OUTPATIENT
Start: 2023-11-10 | End: 2023-11-10

## 2023-11-10 RX ORDER — METHYLPREDNISOLONE SODIUM SUCCINATE 40 MG/ML
40 INJECTION, POWDER, LYOPHILIZED, FOR SOLUTION INTRAMUSCULAR; INTRAVENOUS ONCE
Status: COMPLETED | OUTPATIENT
Start: 2023-11-10 | End: 2023-11-10

## 2023-11-10 RX ADMIN — METHOTREXATE 25 MG: 25 SOLUTION INTRA-ARTERIAL; INTRAMUSCULAR; INTRATHECAL; INTRAVENOUS at 12:20

## 2023-11-10 RX ADMIN — DIPHENHYDRAMINE HYDROCHLORIDE 50 MG: 50 INJECTION, SOLUTION INTRAMUSCULAR; INTRAVENOUS at 10:12

## 2023-11-10 RX ADMIN — SODIUM CHLORIDE 1000 ML: 0.9 INJECTION, SOLUTION INTRAVENOUS at 10:07

## 2023-11-10 RX ADMIN — FAMOTIDINE 40 MG: 10 INJECTION, SOLUTION INTRAVENOUS at 10:55

## 2023-11-10 RX ADMIN — METHYLPREDNISOLONE SODIUM SUCCINATE 40 MG: 40 INJECTION, POWDER, FOR SOLUTION INTRAMUSCULAR; INTRAVENOUS at 11:20

## 2023-11-13 ENCOUNTER — HOSPITAL ENCOUNTER (OUTPATIENT)
Dept: INFUSION CENTER | Facility: HOSPITAL | Age: 48
Discharge: HOME | End: 2023-11-13
Attending: INTERNAL MEDICINE
Payer: COMMERCIAL

## 2023-11-13 VITALS
DIASTOLIC BLOOD PRESSURE: 82 MMHG | TEMPERATURE: 98.1 F | RESPIRATION RATE: 18 BRPM | HEART RATE: 100 BPM | OXYGEN SATURATION: 94 % | SYSTOLIC BLOOD PRESSURE: 119 MMHG

## 2023-11-13 DIAGNOSIS — R19.7 VOMITING AND DIARRHEA: Primary | ICD-10-CM

## 2023-11-13 DIAGNOSIS — E44.1 MILD PROTEIN-CALORIE MALNUTRITION (HCC): ICD-10-CM

## 2023-11-13 DIAGNOSIS — R11.10 VOMITING AND DIARRHEA: Primary | ICD-10-CM

## 2023-11-13 DIAGNOSIS — R63.0 ANOREXIA: ICD-10-CM

## 2023-11-13 PROCEDURE — 96375 TX/PRO/DX INJ NEW DRUG ADDON: CPT

## 2023-11-13 PROCEDURE — 96365 THER/PROPH/DIAG IV INF INIT: CPT

## 2023-11-13 PROCEDURE — 96361 HYDRATE IV INFUSION ADD-ON: CPT

## 2023-11-13 RX ORDER — METHYLPREDNISOLONE SODIUM SUCCINATE 40 MG/ML
40 INJECTION, POWDER, LYOPHILIZED, FOR SOLUTION INTRAMUSCULAR; INTRAVENOUS ONCE
Status: CANCELLED
Start: 2023-11-14 | End: 2023-11-14

## 2023-11-13 RX ORDER — METHYLPREDNISOLONE SODIUM SUCCINATE 40 MG/ML
40 INJECTION, POWDER, LYOPHILIZED, FOR SOLUTION INTRAMUSCULAR; INTRAVENOUS ONCE
Status: COMPLETED | OUTPATIENT
Start: 2023-11-13 | End: 2023-11-13

## 2023-11-13 RX ADMIN — FAMOTIDINE 40 MG: 10 INJECTION INTRAVENOUS at 12:55

## 2023-11-13 RX ADMIN — METHYLPREDNISOLONE SODIUM SUCCINATE 40 MG: 40 INJECTION, POWDER, FOR SOLUTION INTRAMUSCULAR; INTRAVENOUS at 13:29

## 2023-11-13 RX ADMIN — SODIUM CHLORIDE 1000 ML: 0.9 INJECTION, SOLUTION INTRAVENOUS at 12:14

## 2023-11-13 RX ADMIN — DIPHENHYDRAMINE HYDROCHLORIDE 50 MG: 50 INJECTION, SOLUTION INTRAMUSCULAR; INTRAVENOUS at 12:18

## 2023-11-14 ENCOUNTER — HOSPITAL ENCOUNTER (OUTPATIENT)
Dept: INFUSION CENTER | Facility: HOSPITAL | Age: 48
Discharge: HOME | End: 2023-11-14
Attending: INTERNAL MEDICINE
Payer: COMMERCIAL

## 2023-11-14 VITALS
SYSTOLIC BLOOD PRESSURE: 125 MMHG | TEMPERATURE: 98.1 F | DIASTOLIC BLOOD PRESSURE: 76 MMHG | HEART RATE: 100 BPM | OXYGEN SATURATION: 97 % | RESPIRATION RATE: 16 BRPM

## 2023-11-14 DIAGNOSIS — R19.7 VOMITING AND DIARRHEA: Primary | ICD-10-CM

## 2023-11-14 DIAGNOSIS — R63.0 ANOREXIA: ICD-10-CM

## 2023-11-14 DIAGNOSIS — E44.1 MILD PROTEIN-CALORIE MALNUTRITION (HCC): ICD-10-CM

## 2023-11-14 DIAGNOSIS — R11.10 VOMITING AND DIARRHEA: Primary | ICD-10-CM

## 2023-11-14 PROCEDURE — 96365 THER/PROPH/DIAG IV INF INIT: CPT

## 2023-11-14 PROCEDURE — 96375 TX/PRO/DX INJ NEW DRUG ADDON: CPT

## 2023-11-14 PROCEDURE — 96367 TX/PROPH/DG ADDL SEQ IV INF: CPT

## 2023-11-14 PROCEDURE — 96361 HYDRATE IV INFUSION ADD-ON: CPT

## 2023-11-14 RX ORDER — METHYLPREDNISOLONE SODIUM SUCCINATE 40 MG/ML
40 INJECTION, POWDER, LYOPHILIZED, FOR SOLUTION INTRAMUSCULAR; INTRAVENOUS ONCE
Status: COMPLETED | OUTPATIENT
Start: 2023-11-14 | End: 2023-11-14

## 2023-11-14 RX ORDER — METHYLPREDNISOLONE SODIUM SUCCINATE 40 MG/ML
40 INJECTION, POWDER, LYOPHILIZED, FOR SOLUTION INTRAMUSCULAR; INTRAVENOUS ONCE
Status: CANCELLED
Start: 2023-11-16 | End: 2023-11-15

## 2023-11-14 RX ADMIN — SODIUM CHLORIDE 1000 ML: 0.9 INJECTION, SOLUTION INTRAVENOUS at 11:41

## 2023-11-14 RX ADMIN — DIPHENHYDRAMINE HYDROCHLORIDE 50 MG: 50 INJECTION, SOLUTION INTRAMUSCULAR; INTRAVENOUS at 11:42

## 2023-11-14 RX ADMIN — METHYLPREDNISOLONE SODIUM SUCCINATE 40 MG: 40 INJECTION, POWDER, FOR SOLUTION INTRAMUSCULAR; INTRAVENOUS at 12:40

## 2023-11-14 RX ADMIN — FAMOTIDINE 40 MG: 10 INJECTION INTRAVENOUS at 12:15

## 2023-11-16 ENCOUNTER — HOSPITAL ENCOUNTER (OUTPATIENT)
Dept: INFUSION CENTER | Facility: HOSPITAL | Age: 48
Discharge: HOME | End: 2023-11-16
Attending: INTERNAL MEDICINE
Payer: COMMERCIAL

## 2023-11-16 ENCOUNTER — APPOINTMENT (OUTPATIENT)
Dept: LAB | Facility: HOSPITAL | Age: 48
End: 2023-11-16
Payer: COMMERCIAL

## 2023-11-16 VITALS
SYSTOLIC BLOOD PRESSURE: 128 MMHG | OXYGEN SATURATION: 95 % | RESPIRATION RATE: 16 BRPM | TEMPERATURE: 99 F | DIASTOLIC BLOOD PRESSURE: 82 MMHG | HEART RATE: 108 BPM

## 2023-11-16 DIAGNOSIS — R11.10 VOMITING AND DIARRHEA: Primary | ICD-10-CM

## 2023-11-16 DIAGNOSIS — E11.9 DIABETES MELLITUS WITHOUT COMPLICATION (HCC): ICD-10-CM

## 2023-11-16 DIAGNOSIS — Z13.9 SCREENING FOR UNSPECIFIED CONDITION: ICD-10-CM

## 2023-11-16 DIAGNOSIS — E44.1 MILD PROTEIN-CALORIE MALNUTRITION (HCC): ICD-10-CM

## 2023-11-16 DIAGNOSIS — R19.7 VOMITING AND DIARRHEA: Primary | ICD-10-CM

## 2023-11-16 DIAGNOSIS — R63.0 ANOREXIA: ICD-10-CM

## 2023-11-16 DIAGNOSIS — E56.9 MULTIPLE VITAMIN DEFICIENCY DISEASE: ICD-10-CM

## 2023-11-16 DIAGNOSIS — K50.919 CROHN'S DISEASE WITH COMPLICATION, UNSPECIFIED GASTROINTESTINAL TRACT LOCATION (HCC): ICD-10-CM

## 2023-11-16 LAB
25(OH)D3 SERPL-MCNC: 20.2 NG/ML (ref 30–100)
ALBUMIN SERPL BCP-MCNC: 4 G/DL (ref 3.5–5)
ALP SERPL-CCNC: 66 U/L (ref 34–104)
ALT SERPL W P-5'-P-CCNC: 33 U/L (ref 7–52)
ANION GAP SERPL CALCULATED.3IONS-SCNC: 9 MMOL/L
AST SERPL W P-5'-P-CCNC: 18 U/L (ref 13–39)
BASOPHILS # BLD AUTO: 0.13 THOUSANDS/ÂΜL (ref 0–0.1)
BASOPHILS NFR BLD AUTO: 2 % (ref 0–1)
BILIRUB SERPL-MCNC: 0.29 MG/DL (ref 0.2–1)
BUN SERPL-MCNC: 10 MG/DL (ref 5–25)
CALCIUM SERPL-MCNC: 9 MG/DL (ref 8.4–10.2)
CHLORIDE SERPL-SCNC: 106 MMOL/L (ref 96–108)
CHOLEST SERPL-MCNC: 181 MG/DL
CO2 SERPL-SCNC: 22 MMOL/L (ref 21–32)
CREAT SERPL-MCNC: 0.59 MG/DL (ref 0.6–1.3)
EOSINOPHIL # BLD AUTO: 0.07 THOUSAND/ÂΜL (ref 0–0.61)
EOSINOPHIL NFR BLD AUTO: 1 % (ref 0–6)
ERYTHROCYTE [DISTWIDTH] IN BLOOD BY AUTOMATED COUNT: 19 % (ref 11.6–15.1)
EST. AVERAGE GLUCOSE BLD GHB EST-MCNC: 117 MG/DL
FERRITIN SERPL-MCNC: 144 NG/ML (ref 11–307)
FOLATE SERPL-MCNC: >22.3 NG/ML
GFR SERPL CREATININE-BSD FRML MDRD: 107 ML/MIN/1.73SQ M
GLUCOSE SERPL-MCNC: 110 MG/DL (ref 65–140)
HBA1C MFR BLD: 5.7 %
HCT VFR BLD AUTO: 43.6 % (ref 34.8–46.1)
HDLC SERPL-MCNC: 59 MG/DL
HGB BLD-MCNC: 14 G/DL (ref 11.5–15.4)
IMM GRANULOCYTES # BLD AUTO: 0.03 THOUSAND/UL (ref 0–0.2)
IMM GRANULOCYTES NFR BLD AUTO: 0 % (ref 0–2)
IRON SATN MFR SERPL: 6 % (ref 15–50)
IRON SERPL-MCNC: 18 UG/DL (ref 50–212)
LDLC SERPL CALC-MCNC: 79 MG/DL (ref 0–100)
LYMPHOCYTES # BLD AUTO: 0.67 THOUSANDS/ÂΜL (ref 0.6–4.47)
LYMPHOCYTES NFR BLD AUTO: 8 % (ref 14–44)
MCH RBC QN AUTO: 29.6 PG (ref 26.8–34.3)
MCHC RBC AUTO-ENTMCNC: 32.1 G/DL (ref 31.4–37.4)
MCV RBC AUTO: 92 FL (ref 82–98)
MONOCYTES # BLD AUTO: 0.5 THOUSAND/ÂΜL (ref 0.17–1.22)
MONOCYTES NFR BLD AUTO: 6 % (ref 4–12)
NEUTROPHILS # BLD AUTO: 7.08 THOUSANDS/ÂΜL (ref 1.85–7.62)
NEUTS SEG NFR BLD AUTO: 83 % (ref 43–75)
NONHDLC SERPL-MCNC: 122 MG/DL
NRBC BLD AUTO-RTO: 0 /100 WBCS
PLATELET # BLD AUTO: 384 THOUSANDS/UL (ref 149–390)
PMV BLD AUTO: 8.4 FL (ref 8.9–12.7)
POTASSIUM SERPL-SCNC: 3.6 MMOL/L (ref 3.5–5.3)
PROT SERPL-MCNC: 6.8 G/DL (ref 6.4–8.4)
RBC # BLD AUTO: 4.73 MILLION/UL (ref 3.81–5.12)
SODIUM SERPL-SCNC: 137 MMOL/L (ref 135–147)
T3 SERPL-MCNC: 0.9 NG/ML
T4 FREE SERPL-MCNC: 0.83 NG/DL (ref 0.61–1.12)
TIBC SERPL-MCNC: 286 UG/DL (ref 250–450)
TRIGL SERPL-MCNC: 217 MG/DL
TSH SERPL DL<=0.05 MIU/L-ACNC: 0.65 UIU/ML (ref 0.45–4.5)
UIBC SERPL-MCNC: 268 UG/DL (ref 155–355)
VIT B12 SERPL-MCNC: 1258 PG/ML (ref 180–914)
WBC # BLD AUTO: 8.48 THOUSAND/UL (ref 4.31–10.16)

## 2023-11-16 PROCEDURE — 36415 COLL VENOUS BLD VENIPUNCTURE: CPT

## 2023-11-16 PROCEDURE — 84439 ASSAY OF FREE THYROXINE: CPT

## 2023-11-16 PROCEDURE — 82728 ASSAY OF FERRITIN: CPT

## 2023-11-16 PROCEDURE — 83550 IRON BINDING TEST: CPT

## 2023-11-16 PROCEDURE — 96375 TX/PRO/DX INJ NEW DRUG ADDON: CPT

## 2023-11-16 PROCEDURE — 80053 COMPREHEN METABOLIC PANEL: CPT

## 2023-11-16 PROCEDURE — 85025 COMPLETE CBC W/AUTO DIFF WBC: CPT

## 2023-11-16 PROCEDURE — 82306 VITAMIN D 25 HYDROXY: CPT

## 2023-11-16 PROCEDURE — 96361 HYDRATE IV INFUSION ADD-ON: CPT

## 2023-11-16 PROCEDURE — 82746 ASSAY OF FOLIC ACID SERUM: CPT

## 2023-11-16 PROCEDURE — 82607 VITAMIN B-12: CPT

## 2023-11-16 PROCEDURE — 84443 ASSAY THYROID STIM HORMONE: CPT

## 2023-11-16 PROCEDURE — 83036 HEMOGLOBIN GLYCOSYLATED A1C: CPT

## 2023-11-16 PROCEDURE — 96367 TX/PROPH/DG ADDL SEQ IV INF: CPT

## 2023-11-16 PROCEDURE — 96409 CHEMO IV PUSH SNGL DRUG: CPT

## 2023-11-16 PROCEDURE — 84590 ASSAY OF VITAMIN A: CPT

## 2023-11-16 PROCEDURE — 80061 LIPID PANEL: CPT

## 2023-11-16 PROCEDURE — 83540 ASSAY OF IRON: CPT

## 2023-11-16 PROCEDURE — 84480 ASSAY TRIIODOTHYRONINE (T3): CPT

## 2023-11-16 RX ORDER — METHYLPREDNISOLONE SODIUM SUCCINATE 40 MG/ML
40 INJECTION, POWDER, LYOPHILIZED, FOR SOLUTION INTRAMUSCULAR; INTRAVENOUS ONCE
Status: COMPLETED | OUTPATIENT
Start: 2023-11-16 | End: 2023-11-16

## 2023-11-16 RX ORDER — METHOTREXATE 25 MG/ML
25 INJECTION INTRA-ARTERIAL; INTRAMUSCULAR; INTRATHECAL; INTRAVENOUS ONCE
Status: COMPLETED | OUTPATIENT
Start: 2023-11-16 | End: 2023-11-16

## 2023-11-16 RX ORDER — METHYLPREDNISOLONE SODIUM SUCCINATE 40 MG/ML
40 INJECTION, POWDER, LYOPHILIZED, FOR SOLUTION INTRAMUSCULAR; INTRAVENOUS ONCE
Status: CANCELLED
Start: 2023-11-27 | End: 2023-11-17

## 2023-11-16 RX ADMIN — SODIUM CHLORIDE 1000 ML: 0.9 INJECTION, SOLUTION INTRAVENOUS at 11:52

## 2023-11-16 RX ADMIN — DIPHENHYDRAMINE HYDROCHLORIDE 50 MG: 50 INJECTION, SOLUTION INTRAMUSCULAR; INTRAVENOUS at 11:52

## 2023-11-16 RX ADMIN — METHYLPREDNISOLONE SODIUM SUCCINATE 40 MG: 40 INJECTION, POWDER, FOR SOLUTION INTRAMUSCULAR; INTRAVENOUS at 13:11

## 2023-11-16 RX ADMIN — FAMOTIDINE 40 MG: 10 INJECTION INTRAVENOUS at 12:23

## 2023-11-16 RX ADMIN — METHOTREXATE 25 MG: 25 SOLUTION INTRA-ARTERIAL; INTRAMUSCULAR; INTRATHECAL; INTRAVENOUS at 14:15

## 2023-11-17 ENCOUNTER — APPOINTMENT (OUTPATIENT)
Dept: LAB | Facility: HOSPITAL | Age: 48
End: 2023-11-17
Payer: COMMERCIAL

## 2023-11-22 ENCOUNTER — TELEPHONE (OUTPATIENT)
Dept: GASTROENTEROLOGY | Facility: CLINIC | Age: 48
End: 2023-11-22

## 2023-11-22 LAB — VIT A SERPL-MCNC: 44.6 UG/DL (ref 20.1–62)

## 2023-11-22 NOTE — TELEPHONE ENCOUNTER
Hi,    Can we send in a paper script for her to receive iron infusions (200mg IV venofer x5, once a week). Thank you!

## 2023-11-22 NOTE — PLAN OF CARE
HPI  The patient is an 82-year-old male with PMH of CLL, DM2, and HTN presenting to the emergency department from OSH for neurosurgery evaluation.  Reportedly appeared to be altered prompting his wife to take him to OSH for evaluation.  Patient was aphasic when he arrived at OSH prompting stroke alert.  CT head was obtained that demonstrated an acute on chronic subdural with approximately 1.2 cm of midline shift.  No thrombolytics given as a result of imaging.  Patient additionally noted to have UTI for which she received Rocephin.  Transferred for evaluation.    Physical Exam  VITALS: Vital signs reviewed in nursing triage note, EMR flow sheets, and at patient's bedside  CONSTITUTIONAL: Well-appearing, in no apparent distress  HEAD: NCAT  EYES: PERRL, EOMI  NECK: Supple, non-tender, full ROM  CARD: RRR, no m/r/g, no JVD  RESP: LCTAB, speaking full sentences, no increased work of breathing, no use of accessory respiratory muscles  ABD: Bowel sounds present, non-distended, soft and non-tender, no palpable organomegaly, no masses, no guarding or rebound tenderness  BACK: No vertebral point or CVA tenderness, no obvious bony deformities, no spinal step-offs   EXT: Normal ROM in all four extremities, 2+ radial and DP pulses bilaterally, no edema  SKIN: Dry with appropriate turgor, no apparent rashes, suspicious lesions, or masses   NEURO: CNs II-XII grossly intact, AAOx0 (patient appears to be aphasic), sensation intact bilaterally, strength 5/5 on UEs and LEs bilaterally  PSYCH: Appropriate mood and affect, no HI/SI, not responding to internal stimuli    Vitals:    11/22/23 0132   BP: 134/71   Pulse: 71   Resp: 25   Temp:    SpO2: 98%       Assessment/Plan/MDM  Patient clinical stable with normal vital signs upon presentation to the emergency department.  Given the nature of his transfer, no surgery consulted for evaluation.  He is due for 6-hour stability scan, therefore that was obtained upon arrival which  Problem: Potential for Falls  Goal: Patient will remain free of falls  Description: INTERVENTIONS:  - Assess patient frequently for physical needs  -  Identify cognitive and physical deficits and behaviors that affect risk of falls    -  [place] fall precautions as indicated by assessment   - Educate patient/family on patient safety including physical limitations  - Instruct patient to call for assistance with activity based on assessment  - Modify environment to reduce risk of injury  - Consider OT/PT consult to assist with strengthening/mobility  Outcome: Progressing demonstrated stable acute on chronic SDH with approximately 10 mm of midline shift.  Unsure if there is language barrier, or if patient is aphasic, however does appear to be intermittently following commands.  Will obtain laboratory work-up per neurosurgery's request.  Ultimate disposition will be contingent on the recommendations, however suspect he will likely require admission given the degree of MLS.    Results  *See section(s) entitled ``Lab Results´´, ``Diagnostic Imaging Results Review´´ for entirety.  Notable results listed below  - Labs: I independently interpreted as magnesium 1.48, INR 1.2, remainder of laboratory workup unremarkable  - Images: I independently interpreted as CT head demonstrates 2.6 cm SDH with 10 to 11 mm of MLS    ED Course as of 11/22/23 0145 Wed Nov 22, 2023   0145 Neurosurgery states they will take patient to OR for evacuation followed by [facility]. Will be admitted to neurosurgery service. [JV]      ED Course User Index  [JV] Jose R Chaudhry MD        Clinical Impression  SDH with midline shift    Dispo  Admit to [facility]    Patient seen and discussed with attending physician Dr. Peace.    Jose R Chaudhry MD  Emergency Medicine, PGY-3    Was dictated using Dragon dictation. Please excuse any errors found in the note.     Jose R Chaudhry MD  Resident  11/22/23 0146

## 2023-11-24 NOTE — TELEPHONE ENCOUNTER
11/24 I checked with Jillian at [facility], we believe you just entered it under the Hydration order last time, You can do that again.

## 2023-11-27 ENCOUNTER — HOSPITAL ENCOUNTER (OUTPATIENT)
Dept: INFUSION CENTER | Facility: HOSPITAL | Age: 48
Discharge: HOME | End: 2023-11-27
Payer: COMMERCIAL

## 2023-11-27 ENCOUNTER — OFFICE VISIT (OUTPATIENT)
Dept: FAMILY MEDICINE CLINIC | Facility: HOME HEALTHCARE | Age: 48
End: 2023-11-27
Payer: COMMERCIAL

## 2023-11-27 VITALS
OXYGEN SATURATION: 98 % | DIASTOLIC BLOOD PRESSURE: 68 MMHG | WEIGHT: 158 LBS | HEIGHT: 60 IN | BODY MASS INDEX: 31.02 KG/M2 | HEART RATE: 98 BPM | TEMPERATURE: 98.6 F | SYSTOLIC BLOOD PRESSURE: 108 MMHG | RESPIRATION RATE: 18 BRPM

## 2023-11-27 VITALS
SYSTOLIC BLOOD PRESSURE: 132 MMHG | RESPIRATION RATE: 18 BRPM | TEMPERATURE: 98.4 F | OXYGEN SATURATION: 96 % | DIASTOLIC BLOOD PRESSURE: 79 MMHG | HEART RATE: 100 BPM

## 2023-11-27 DIAGNOSIS — R11.10 VOMITING AND DIARRHEA: Primary | ICD-10-CM

## 2023-11-27 DIAGNOSIS — D50.0 IRON DEFICIENCY ANEMIA DUE TO CHRONIC BLOOD LOSS: ICD-10-CM

## 2023-11-27 DIAGNOSIS — J01.00 ACUTE MAXILLARY SINUSITIS, RECURRENCE NOT SPECIFIED: Primary | ICD-10-CM

## 2023-11-27 DIAGNOSIS — E44.1 MILD PROTEIN-CALORIE MALNUTRITION (HCC): ICD-10-CM

## 2023-11-27 DIAGNOSIS — R63.0 ANOREXIA: ICD-10-CM

## 2023-11-27 DIAGNOSIS — L50.1 CHRONIC IDIOPATHIC URTICARIA: ICD-10-CM

## 2023-11-27 DIAGNOSIS — R19.7 VOMITING AND DIARRHEA: Primary | ICD-10-CM

## 2023-11-27 DIAGNOSIS — E61.1 IRON DEFICIENCY: ICD-10-CM

## 2023-11-27 PROCEDURE — 96367 TX/PROPH/DG ADDL SEQ IV INF: CPT

## 2023-11-27 PROCEDURE — 96365 THER/PROPH/DIAG IV INF INIT: CPT

## 2023-11-27 PROCEDURE — 96375 TX/PRO/DX INJ NEW DRUG ADDON: CPT

## 2023-11-27 PROCEDURE — 96366 THER/PROPH/DIAG IV INF ADDON: CPT

## 2023-11-27 PROCEDURE — 99213 OFFICE O/P EST LOW 20 MIN: CPT | Performed by: FAMILY MEDICINE

## 2023-11-27 RX ORDER — CEPHALEXIN 500 MG/1
500 CAPSULE ORAL EVERY 8 HOURS SCHEDULED
Qty: 21 CAPSULE | Refills: 0 | Status: SHIPPED | OUTPATIENT
Start: 2023-11-27 | End: 2023-12-04

## 2023-11-27 RX ORDER — ARIPIPRAZOLE 10 MG/1
10 TABLET, ORALLY DISINTEGRATING ORAL
COMMUNITY
Start: 2023-08-23 | End: 2023-11-30

## 2023-11-27 RX ORDER — MULTIVIT WITH MINERALS/LUTEIN
1000 TABLET ORAL 2 TIMES DAILY
COMMUNITY

## 2023-11-27 RX ORDER — UREA 10 %
500 LOTION (ML) TOPICAL 2 TIMES DAILY
COMMUNITY

## 2023-11-27 RX ORDER — METHYLPREDNISOLONE SODIUM SUCCINATE 40 MG/ML
40 INJECTION, POWDER, LYOPHILIZED, FOR SOLUTION INTRAMUSCULAR; INTRAVENOUS ONCE
Status: CANCELLED
Start: 2023-11-27 | End: 2023-11-28

## 2023-11-27 RX ORDER — MELATONIN
1000 2 TIMES DAILY
COMMUNITY

## 2023-11-27 RX ORDER — METHYLPREDNISOLONE SODIUM SUCCINATE 40 MG/ML
40 INJECTION, POWDER, LYOPHILIZED, FOR SOLUTION INTRAMUSCULAR; INTRAVENOUS ONCE
Status: COMPLETED | OUTPATIENT
Start: 2023-11-27 | End: 2023-11-27

## 2023-11-27 RX ADMIN — FAMOTIDINE 40 MG: 10 INJECTION INTRAVENOUS at 13:17

## 2023-11-27 RX ADMIN — DIPHENHYDRAMINE HYDROCHLORIDE 50 MG: 50 INJECTION, SOLUTION INTRAMUSCULAR; INTRAVENOUS at 12:41

## 2023-11-27 RX ADMIN — METHYLPREDNISOLONE SODIUM SUCCINATE 40 MG: 40 INJECTION, POWDER, FOR SOLUTION INTRAMUSCULAR; INTRAVENOUS at 14:01

## 2023-11-27 RX ADMIN — SODIUM CHLORIDE 1000 ML: 0.9 INJECTION, SOLUTION INTRAVENOUS at 12:37

## 2023-11-29 ENCOUNTER — HOSPITAL ENCOUNTER (OUTPATIENT)
Dept: INFUSION CENTER | Facility: HOSPITAL | Age: 48
Discharge: HOME | End: 2023-11-29
Attending: INTERNAL MEDICINE
Payer: COMMERCIAL

## 2023-11-29 VITALS
TEMPERATURE: 98.4 F | OXYGEN SATURATION: 94 % | SYSTOLIC BLOOD PRESSURE: 109 MMHG | RESPIRATION RATE: 16 BRPM | DIASTOLIC BLOOD PRESSURE: 68 MMHG | HEART RATE: 102 BPM

## 2023-11-29 DIAGNOSIS — R11.10 VOMITING AND DIARRHEA: Primary | ICD-10-CM

## 2023-11-29 DIAGNOSIS — L50.1 CHRONIC IDIOPATHIC URTICARIA: ICD-10-CM

## 2023-11-29 DIAGNOSIS — R63.0 ANOREXIA: ICD-10-CM

## 2023-11-29 DIAGNOSIS — R19.7 VOMITING AND DIARRHEA: Primary | ICD-10-CM

## 2023-11-29 DIAGNOSIS — E44.1 MILD PROTEIN-CALORIE MALNUTRITION (HCC): ICD-10-CM

## 2023-11-29 PROCEDURE — 96361 HYDRATE IV INFUSION ADD-ON: CPT

## 2023-11-29 PROCEDURE — 96372 THER/PROPH/DIAG INJ SC/IM: CPT

## 2023-11-29 PROCEDURE — 96375 TX/PRO/DX INJ NEW DRUG ADDON: CPT

## 2023-11-29 PROCEDURE — 96365 THER/PROPH/DIAG IV INF INIT: CPT

## 2023-11-29 RX ORDER — METHYLPREDNISOLONE SODIUM SUCCINATE 40 MG/ML
40 INJECTION, POWDER, LYOPHILIZED, FOR SOLUTION INTRAMUSCULAR; INTRAVENOUS ONCE
Status: CANCELLED
Start: 2023-11-29 | End: 2023-11-30

## 2023-11-29 RX ORDER — METHYLPREDNISOLONE SODIUM SUCCINATE 40 MG/ML
40 INJECTION, POWDER, LYOPHILIZED, FOR SOLUTION INTRAMUSCULAR; INTRAVENOUS ONCE
Status: COMPLETED | OUTPATIENT
Start: 2023-11-29 | End: 2023-11-29

## 2023-11-29 RX ORDER — EPINEPHRINE 1 MG/ML
0.3 INJECTION, SOLUTION, CONCENTRATE INTRAVENOUS
OUTPATIENT
Start: 2023-11-30

## 2023-11-29 RX ORDER — EPINEPHRINE 1 MG/ML
0.3 INJECTION, SOLUTION, CONCENTRATE INTRAVENOUS
Status: DISCONTINUED | OUTPATIENT
Start: 2023-11-29 | End: 2023-12-02 | Stop reason: HOSPADM

## 2023-11-29 RX ORDER — METHOTREXATE 25 MG/ML
25 INJECTION INTRA-ARTERIAL; INTRAMUSCULAR; INTRATHECAL; INTRAVENOUS ONCE
Status: COMPLETED | OUTPATIENT
Start: 2023-12-01 | End: 2023-12-01

## 2023-11-29 RX ADMIN — FAMOTIDINE 40 MG: 10 INJECTION INTRAVENOUS at 12:30

## 2023-11-29 RX ADMIN — METHYLPREDNISOLONE SODIUM SUCCINATE 40 MG: 40 INJECTION, POWDER, FOR SOLUTION INTRAMUSCULAR; INTRAVENOUS at 12:59

## 2023-11-29 RX ADMIN — SODIUM CHLORIDE 1000 ML: 0.9 INJECTION, SOLUTION INTRAVENOUS at 11:43

## 2023-11-29 RX ADMIN — OMALIZUMAB 300 MG: 150 INJECTION, SOLUTION SUBCUTANEOUS at 13:53

## 2023-11-29 RX ADMIN — DIPHENHYDRAMINE HYDROCHLORIDE 50 MG: 50 INJECTION, SOLUTION INTRAMUSCULAR; INTRAVENOUS at 11:52

## 2023-11-30 ENCOUNTER — OFFICE VISIT (OUTPATIENT)
Dept: PSYCHIATRY | Facility: CLINIC | Age: 48
End: 2023-11-30
Payer: COMMERCIAL

## 2023-11-30 DIAGNOSIS — F41.1 GENERALIZED ANXIETY DISORDER: Primary | ICD-10-CM

## 2023-11-30 PROCEDURE — 99214 OFFICE O/P EST MOD 30 MIN: CPT | Performed by: STUDENT IN AN ORGANIZED HEALTH CARE EDUCATION/TRAINING PROGRAM

## 2023-11-30 PROCEDURE — 90833 PSYTX W PT W E/M 30 MIN: CPT | Performed by: STUDENT IN AN ORGANIZED HEALTH CARE EDUCATION/TRAINING PROGRAM

## 2023-11-30 RX ORDER — MIRTAZAPINE 7.5 MG/1
7.5 TABLET, FILM COATED ORAL
Qty: 30 TABLET | Refills: 1 | Status: SHIPPED | OUTPATIENT
Start: 2023-11-30

## 2023-11-30 RX ORDER — DESVENLAFAXINE 25 MG/1
25 TABLET, EXTENDED RELEASE ORAL DAILY
Qty: 30 TABLET | Refills: 1 | Status: SHIPPED | OUTPATIENT
Start: 2023-11-30

## 2023-12-01 ENCOUNTER — HOSPITAL ENCOUNTER (OUTPATIENT)
Dept: INFUSION CENTER | Facility: HOSPITAL | Age: 48
End: 2023-12-01
Payer: COMMERCIAL

## 2023-12-01 ENCOUNTER — TELEPHONE (OUTPATIENT)
Age: 48
End: 2023-12-01

## 2023-12-01 VITALS
TEMPERATURE: 97.9 F | DIASTOLIC BLOOD PRESSURE: 85 MMHG | SYSTOLIC BLOOD PRESSURE: 116 MMHG | RESPIRATION RATE: 16 BRPM | HEART RATE: 110 BPM

## 2023-12-01 DIAGNOSIS — R11.10 VOMITING AND DIARRHEA: Primary | ICD-10-CM

## 2023-12-01 DIAGNOSIS — E44.1 MILD PROTEIN-CALORIE MALNUTRITION (HCC): ICD-10-CM

## 2023-12-01 DIAGNOSIS — R63.0 ANOREXIA: ICD-10-CM

## 2023-12-01 DIAGNOSIS — R19.7 VOMITING AND DIARRHEA: Primary | ICD-10-CM

## 2023-12-01 RX ORDER — METHYLPREDNISOLONE SODIUM SUCCINATE 40 MG/ML
40 INJECTION, POWDER, LYOPHILIZED, FOR SOLUTION INTRAMUSCULAR; INTRAVENOUS ONCE
Status: CANCELLED
Start: 2023-12-05 | End: 2023-12-02

## 2023-12-01 RX ORDER — METHYLPREDNISOLONE SODIUM SUCCINATE 40 MG/ML
40 INJECTION, POWDER, LYOPHILIZED, FOR SOLUTION INTRAMUSCULAR; INTRAVENOUS ONCE
Status: COMPLETED | OUTPATIENT
Start: 2023-12-01 | End: 2023-12-01

## 2023-12-01 RX ADMIN — DIPHENHYDRAMINE HYDROCHLORIDE 50 MG: 50 INJECTION, SOLUTION INTRAMUSCULAR; INTRAVENOUS at 11:54

## 2023-12-01 RX ADMIN — SODIUM CHLORIDE 1000 ML: 0.9 INJECTION, SOLUTION INTRAVENOUS at 11:49

## 2023-12-01 RX ADMIN — METHYLPREDNISOLONE SODIUM SUCCINATE 40 MG: 40 INJECTION, POWDER, FOR SOLUTION INTRAMUSCULAR; INTRAVENOUS at 13:18

## 2023-12-01 RX ADMIN — METHOTREXATE 25 MG: 25 SOLUTION INTRA-ARTERIAL; INTRAMUSCULAR; INTRATHECAL; INTRAVENOUS at 14:49

## 2023-12-01 RX ADMIN — FAMOTIDINE 40 MG: 10 INJECTION, SOLUTION INTRAVENOUS at 12:17

## 2023-12-01 NOTE — TELEPHONE ENCOUNTER
Patient contacted office stating she is currently at the infusion Center. Patient states that the infusion center faxed paperwork over that needs a written diagnosis code for her to be able to have her infusion. Per office staff, form has been received and is being worked on. Informed patient. Patient understood and had no further questions or concerns.

## 2023-12-02 NOTE — PSYCH
MEDICATION MANAGEMENT NOTE        [facility] [address] [facility]      Name and Date of Birth:  Alexis Loaiza 46 y.o. 1975 MRN: [id number]    Date of Visit: 11/30/2023     Visit Time    Visit Start Time: 1500  Visit Stop Time: 3083  Total Visit Duration:  40 minutes    Reason for Visit: Follow-up visit regarding medication management     Chief Complaint: "I feel like my depression is still bad."    SUBJECTIVE:    Alexis Loaiza is a 46 y.o., female, possessing a past psychiatric history significant for anxiety, medically complicated by mast cell activation syndrome, who was personally seen and evaluated at the [address] [place] [address] outpatient clinic for follow-up regarding medication management. She is currently prescribed mirtazapine 15mg qHS. During interview today, Tereza Molina states that she is feeling "worse". Admits she feels like she is less energetic and less motivated to do things since she started Remeron. States she sleeps well, but does not want to leave house. Blames it on her Crohns, as she states she is still in a flair. Cries about this, states she feels frustrated that "nothing seems to be working". States she is getting infusions for her low iron level, and going to see GI soon. States she feels she is worse with mirtazapine and we reviewed her past antidepressant trials. States she feels anxious and overwhelmed, and still struggles with some chronic pain. States she tried cymbalta without side effects, but little improvements. Expresses concern that she will feel worse on SSRI. Denies thoughts to hurt herself or anyone else, but expresses some passive death wish at times. Contracts for safety, states her  and children are keeping her going. Current Rating Scores:   None completed today.     Psychiatric Review Of Systems:    Appetite: no, no change  Adverse eating: no  Weight changes: no  Insomnia/sleeplessness: no  Fatigue/anergy: yes  Anhedonia/lack of interest: yes  Attention/concentration: no  Psychomotor agitation/retardation: no  Somatic symptoms: yes, nausea  Anxiety/panic attack: worrying  Kendal/hypomania: no  Hopelessness/helplessness/worthlessness: feeling more down and hopeless  Self-injurious behavior/high-risk behavior: no  Suicidal ideation: no  Homicidal ideation: no  Auditory hallucinations: no  Visual hallucinations: no  Other perceptual disturbances: no  Delusional thinking: no  Obsessive/compulsive symptoms: no    Review Of Systems:      Constitutional feeling poorly, feeling tired, and as noted in HPI   ENT negative   Cardiovascular negative   Respiratory negative   Gastrointestinal abdominal discomfort, abdominal cramps, and as noted in HPI   Genitourinary negative   Musculoskeletal negative   Integumentary negative   Neurological negative   Endocrine negative   Other Symptoms none, all other systems are negative     History Review: The following portions of the patient's history were reviewed and updated as appropriate: allergies, current medications, past family history, past medical history, past social history, past surgical history and problem list..         OBJECTIVE:     Vital signs in last 24 hours: There were no vitals filed for this visit.     Mental Status Evaluation:    Appearance age appropriate, casually dressed   Behavior cooperative, appears anxious   Speech normal rate, normal volume, normal pitch   Mood anxious   Affect tearful   Thought Processes organized, goal directed   Associations intact associations   Thought Content no overt delusions   Perceptual Disturbances: no auditory hallucinations, no visual hallucinations   Abnormal Thoughts  Risk Potential Suicidal ideation - None, occasional passive death wish, but denies any active suicidal ideation, intent or plan at present, contracts for safety, would not harm self, would got to Emergency Room if feeling unsafe  Homicidal ideation - None  Potential for aggression - No   Orientation oriented to person, place, time/date and situation   Memory recent and remote memory grossly intact   Consciousness alert and awake   Attention Span Concentration Span attention span and concentration are age appropriate   Intellect appears to be of average intelligence   Insight intact   Judgement intact   Muscle Strength and  Gait normal muscle strength and normal muscle tone, normal gait and normal balance   Motor activity no abnormal movements   Fund of Knowledge adequate knowledge of current events  adequate fund of knowledge regarding past history  adequate fund of knowledge regarding vocabulary    Pain none   Pain Scale 0       Laboratory Results: I have personally reviewed all pertinent laboratory/tests results    Recent Labs (last 2 months):   Appointment on 11/16/2023   Component Date Value    WBC 11/16/2023 8.48     RBC 11/16/2023 4.73     Hemoglobin 11/16/2023 14.0     Hematocrit 11/16/2023 43.6     MCV 11/16/2023 92     MCH 11/16/2023 29.6     MCHC 11/16/2023 32.1     RDW 11/16/2023 19.0 (H)     MPV 11/16/2023 8.4 (L)     Platelets 34/35/8517 384     nRBC 11/16/2023 0     Neutrophils Relative 11/16/2023 83 (H)     Immat GRANS % 11/16/2023 0     Lymphocytes Relative 11/16/2023 8 (L)     Monocytes Relative 11/16/2023 6     Eosinophils Relative 11/16/2023 1     Basophils Relative 11/16/2023 2 (H)     Neutrophils Absolute 11/16/2023 7.08     Immature Grans Absolute 11/16/2023 0.03     Lymphocytes Absolute 11/16/2023 0.67     Monocytes Absolute 11/16/2023 0.50     Eosinophils Absolute 11/16/2023 0.07     Basophils Absolute 11/16/2023 0.13 (H)     Sodium 11/16/2023 137     Potassium 11/16/2023 3.6     Chloride 11/16/2023 106     CO2 11/16/2023 22     ANION GAP 11/16/2023 9     BUN 11/16/2023 10     Creatinine 11/16/2023 0.59 (L)     Glucose 11/16/2023 110     Calcium 11/16/2023 9.0     AST 11/16/2023 18     ALT 11/16/2023 33     Alkaline Phosphatase 11/16/2023 66     Total Protein 11/16/2023 6.8     Albumin 11/16/2023 4.0     Total Bilirubin 11/16/2023 0.29     eGFR 11/16/2023 107     Hemoglobin A1C 11/16/2023 5.7 (H)     EAG 11/16/2023 117     Free T4 11/16/2023 0.83     TSH 3RD GENERATON 11/16/2023 0.653     Cholesterol 11/16/2023 181     Triglycerides 11/16/2023 217 (H)     HDL, Direct 11/16/2023 59     LDL Calculated 11/16/2023 79     Non-HDL-Chol (CHOL-HDL) 11/16/2023 122     T3, Total 11/16/2023 0.9     Vitamin A 11/16/2023 44.6     Vitamin B-12 11/16/2023 1,258 (H)     Folate 11/16/2023 >22.3     Vit D, 25-Hydroxy 11/16/2023 20.2 (L)     Iron Saturation 11/16/2023 6 (L)     TIBC 11/16/2023 286     Iron 11/16/2023 18 (L)     UIBC 11/16/2023 268     Ferritin 11/16/2023 144    Transcribe Orders on 11/16/2023   Component Date Value    Creatinine, Ur 11/17/2023 68.5     Albumin,U,Random 11/17/2023 <7.0     Albumin Creat Ratio 11/17/2023 <10    Appointment on 10/18/2023   Component Date Value    Salmonella sp PCR 10/18/2023 None Detected     Shigella sp/Enteroinvasi* 10/18/2023 None Detected     Campylobacter sp (jejuni* 10/18/2023 None Detected     Shiga toxin 1/Shiga toxi* 10/18/2023 None Detected      C.difficile toxin by PC* 10/18/2023 Negative     Calprotectin 10/18/2023 2390 (H)     Pancreatic Elastase-1 10/18/2023 445     Fat Qual Neutral, Stl 10/18/2023 Normal     Fat,Totall 10/18/2023 Normal     Rotavirus 10/18/2023 Negative    Hospital Outpatient Visit on 10/17/2023   Component Date Value    WBC 10/18/2023 12.85 (H)     RBC 10/18/2023 4.74     Hemoglobin 10/18/2023 13.9     Hematocrit 10/18/2023 43.7     MCV 10/18/2023 92     MCH 10/18/2023 29.3     MCHC 10/18/2023 31.8     RDW 10/18/2023 18.6 (H)     Platelets 57/49/6000 456 (H)     MPV 10/18/2023 8.8 (L)     Sodium 10/18/2023 135     Potassium 10/18/2023 4.4     Chloride 10/18/2023 103     CO2 10/18/2023 22     ANION GAP 10/18/2023 10     BUN 10/18/2023 15     Creatinine 10/18/2023 0.85     Glucose 10/18/2023 94     Calcium 10/18/2023 9.3     AST 10/18/2023 28     ALT 10/18/2023 26     Alkaline Phosphatase 10/18/2023 66     Total Protein 10/18/2023 7.0     Albumin 10/18/2023 3.9     Total Bilirubin 10/18/2023 0.38     eGFR 10/18/2023 80     CRP 10/18/2023 12.6 (H)        Suicide/Homicide Risk Assessment:    The following interventions are recommended: contracts for safety at present - agrees to go to ED if feeling unsafe, contracts for safety at present - agrees to call Crisis Intervention Service if feeling unsafe. Although patient's acute lethality risk is low, long-term/chronic lethality risk is mildly elevated in the presence of depression. At the current moment, Lorrie Yoo is future-oriented, forward-thinking, and demonstrates ability to act in a self-preserving manner as evidenced by volitionally presenting to the clinic today, seeking treatment. To mitigate future risk, patient should adhere to the recommendations below, avoid alcohol/illicit substance use, utilize community-based resources and familiar support and prioritize mental health treatment. Presently, patient denies active suicidal/homicidal ideation in addition to thoughts of self-injury; contracts for safety. At conclusion of evaluation, patient is amenable to the recommendations below. Patient is amenable to calling/contacting the outpatient office including this writer if any acute adverse effects of their medication regimen arise in addition to any comments or concerns pertaining to their psychiatric management. Patient is amenable to calling/contacting crisis and/or attending to the nearest emergency department if their clinical condition deteriorates to assure their safety and stability, stating that they are able to appropriately confide in their  regarding their psychiatric state.     Assessment/Plan:     Diagnoses and all orders for this visit:    Generalized anxiety disorder  -     desvenlafaxine succinate 25 MG TB24; Take 1 tablet (25 mg total) by mouth daily  -     mirtazapine (REMERON) 7.5 MG tablet; Take 1 tablet (7.5 mg total) by mouth daily at bedtime     Jose Maria Cardenas is a 55-year-old female who has a history of depression and anxiety, mostly related to her medical issues. She has been recently been hospitalized following an overdose on benzodiazepines. She participated in the partial hospitalization program has noted improvement since then. She does not appear to be in acute danger to herself or others, wants to work on her issues with her therapist.      Treatment Recommendations/Precautions: Will reduce mirtazapine to 7.5mg qHS for treatment of her anxiety and insomnia. May also help with her nausea. Will start Pristiq 25mg daily for treatment of her depression. Will increase as tolerated, may also help with her pain. Could also consider wellbutrin as well. Medication management every 4 weeks  Continue psychotherapy with own therapist  Aware of 24 hour and weekend coverage for urgent situations accessed by calling [facility] practice number  Patient advised to call 911 if feeling suicidal or homicidal before acting out on their thoughts and they expressed understanding. Medications Risks/Benefits      Risks, Benefits And Possible Side Effects Of Medications:    Risks, benefits, and possible side effects of medications explained to Jose Maria Cardenas and she verbalizes understanding and agreement for treatment. including: Risks and potential side effects of medication discussed with patient including serotonin syndrome, SIADH, worsening depression, suicidality, induction of concetta, GI upset, headaches, activation, sexual side effects, sedation, potential drug interactions, and others. Patient expressed understanding. .     Controlled Medication Discussion:     Not applicable    Psychotherapy Provided:   Individual psychotherapy provided: Yes  Counseling was provided during the session today for 20 minutes.   Medication education provided to Philippe Rviera. Discussed with Philippe Rivera coping with health issues, medical problems, and ongoing anxiety. Importance of medication and treatment compliance reviewed with Philippe Rivera. Reassurance and supportive therapy provided.         Treatment Plan:    Completed and signed during the session: Not applicable - Treatment Plan not due at this session    This note was not shared with the patient due to this is a psychotherapy note      Marshall Salamanca DO 12/02/23

## 2023-12-04 ENCOUNTER — TELEPHONE (OUTPATIENT)
Dept: GASTROENTEROLOGY | Facility: CLINIC | Age: 48
End: 2023-12-04

## 2023-12-04 DIAGNOSIS — D84.9 IMMUNOSUPPRESSED STATUS (HCC): Primary | ICD-10-CM

## 2023-12-04 DIAGNOSIS — K50.119 CROHN'S DISEASE OF COLON WITH COMPLICATION (HCC): ICD-10-CM

## 2023-12-05 ENCOUNTER — HOSPITAL ENCOUNTER (OUTPATIENT)
Dept: INFUSION CENTER | Facility: HOSPITAL | Age: 48
Discharge: HOME | End: 2023-12-05
Payer: COMMERCIAL

## 2023-12-05 VITALS
RESPIRATION RATE: 18 BRPM | SYSTOLIC BLOOD PRESSURE: 143 MMHG | DIASTOLIC BLOOD PRESSURE: 90 MMHG | TEMPERATURE: 98.3 F | HEART RATE: 108 BPM

## 2023-12-05 DIAGNOSIS — R11.10 VOMITING AND DIARRHEA: Primary | ICD-10-CM

## 2023-12-05 DIAGNOSIS — R63.0 ANOREXIA: ICD-10-CM

## 2023-12-05 DIAGNOSIS — R19.7 VOMITING AND DIARRHEA: Primary | ICD-10-CM

## 2023-12-05 DIAGNOSIS — E44.1 MILD PROTEIN-CALORIE MALNUTRITION (HCC): ICD-10-CM

## 2023-12-05 PROCEDURE — 96375 TX/PRO/DX INJ NEW DRUG ADDON: CPT

## 2023-12-05 PROCEDURE — 96367 TX/PROPH/DG ADDL SEQ IV INF: CPT

## 2023-12-05 PROCEDURE — 96365 THER/PROPH/DIAG IV INF INIT: CPT

## 2023-12-05 RX ORDER — METHYLPREDNISOLONE SODIUM SUCCINATE 40 MG/ML
40 INJECTION, POWDER, LYOPHILIZED, FOR SOLUTION INTRAMUSCULAR; INTRAVENOUS ONCE
Status: COMPLETED | OUTPATIENT
Start: 2023-12-05 | End: 2023-12-05

## 2023-12-05 RX ORDER — METHYLPREDNISOLONE SODIUM SUCCINATE 40 MG/ML
40 INJECTION, POWDER, LYOPHILIZED, FOR SOLUTION INTRAMUSCULAR; INTRAVENOUS ONCE
Status: CANCELLED
Start: 2023-12-08 | End: 2023-12-06

## 2023-12-05 RX ADMIN — METHYLPREDNISOLONE SODIUM SUCCINATE 40 MG: 40 INJECTION, POWDER, FOR SOLUTION INTRAMUSCULAR; INTRAVENOUS at 15:08

## 2023-12-05 RX ADMIN — IRON SUCROSE 200 MG: 20 INJECTION, SOLUTION INTRAVENOUS at 15:18

## 2023-12-05 RX ADMIN — FAMOTIDINE 40 MG: 10 INJECTION INTRAVENOUS at 14:41

## 2023-12-05 RX ADMIN — SODIUM CHLORIDE 1000 ML: 0.9 INJECTION, SOLUTION INTRAVENOUS at 13:45

## 2023-12-05 RX ADMIN — DIPHENHYDRAMINE HYDROCHLORIDE 50 MG: 50 INJECTION, SOLUTION INTRAMUSCULAR; INTRAVENOUS at 14:07

## 2023-12-06 ENCOUNTER — TELEMEDICINE (OUTPATIENT)
Dept: BEHAVIORAL/MENTAL HEALTH CLINIC | Facility: CLINIC | Age: 48
End: 2023-12-06
Payer: COMMERCIAL

## 2023-12-06 DIAGNOSIS — F41.1 GENERALIZED ANXIETY DISORDER: Primary | Chronic | ICD-10-CM

## 2023-12-06 DIAGNOSIS — F06.31 DEPRESSION DUE TO PHYSICAL ILLNESS: ICD-10-CM

## 2023-12-06 PROBLEM — E61.1 IRON DEFICIENCY: Status: ACTIVE | Noted: 2023-12-06

## 2023-12-06 PROBLEM — D50.0 IRON DEFICIENCY ANEMIA DUE TO CHRONIC BLOOD LOSS: Status: ACTIVE | Noted: 2023-12-06

## 2023-12-06 PROCEDURE — 90791 PSYCH DIAGNOSTIC EVALUATION: CPT | Performed by: SOCIAL WORKER

## 2023-12-06 NOTE — PSYCH
Behavioral Health Psychotherapy Assessment    Date of Initial Psychotherapy Assessment: 12/06/23  Referral Source: hospital  Has a release of information been signed for the referral source? No    Preferred Name: Kit Kent  Preferred Pronouns: She/her  YOB: 1975 Age: 46 y.o. Sex assigned at birth: female   Gender Identity: female  Race:   Preferred Language: English    Emergency Contact:  Full Name: Evans Heimlich  Relationship to Client:   Contact information: [phone]      Primary Care Physician:  Hai Singleton MD  [address] [place] [address]  [phone]  Has a release of information been signed? No    Physical Health History:  Past surgical procedures: 5 on her stomach, gall bladder, appendix, 2 exploratory, spinkter, tonsils, hysterectomy, ankleknee [id number]  Do you have a history of any of the following: seizures, heart/cardiac issues, traumatic brain injury, thyroid disease, and other systemic mast cell disease, chron's disease , A typical MS  Do you have any mobility issues? No    Relevant Family History:  Dad heart and diabetes  Mom colitis    Presenting Problem (What brings you in?)  Chronic medical issues    Mental Health Advance Directive:  Do you currently have a Mental Health Advance Directive? no    Diagnosis:   Diagnosis ICD-10-CM Associated Orders   1. Generalized anxiety disorder  F41.1       2.  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           Initial Assessment:     Current Mental Status:    Appearance: appropriate      Behavior/Manner: cooperative      Affect/Mood:  Irritable    Speech:  Normal    Sleep:  Normal    Oriented to: oriented to self, oriented to place and oriented to time       Clinical Symptoms    Anxiety: yes      Depression Symptoms: restlessness      Anxiety Symptoms: muscle tension, restlessness, chest tightness, shortness of breath and hot flashes      Have you ever been assaultive to others or the environment: No      Have you ever been self-injurious: No      Counseling History:  Previous Counseling or Treatment  (Mental Health or Drug & Alcohol): Yes    [facility]  Have you previously taken psychiatric medications: Yes    Previous Medications Attempted:  Lorazapam, clorazapam,    Suicide Risk Assessment  Have you ever had a suicide attempt: No    Have you had incidents of suicidal ideation: No    Are you currently experiencing suicidal thoughts: No      Substance Abuse/Addiction Assessment:  Alcohol: No    Heroin: No    Fentanyl: No    Opiates: No    Cocaine: No    Amphetamines: No    Hallucinogens: No    Club Drugs: No    Benzodiazepines: No    Other Rx Meds: No    Marijuana: No    Tobacco/Nicotine: No    Have you experienced blackouts as a result of substance use: No    Have you had any periods of abstinence: No    Have you experienced symptoms of withdrawal: No    Have you ever overdosed on any substances?: No    Are you currently using any Medication Assisted Treatment for Substance Use: No      Compulsive Behaviors:  Compulsive Behavior Information:  None    Disordered Eating History:  Do you have a history of disordered eating: Yes    Type of disordered eating: restrictive eating pattern and excessive/compulsive exercise      Social Determinants of Health:    SDOH:  Stress and other    Other SDOH:  Health issues    Trauma and Abuse History:    Have you ever been abused: No      Legal History:    Have you ever been arrested  or had a DUI: No      Have you been incarcerated: No      Are you currently on parole/probation: No      Any current Children and Youth involvement: No      Any pending legal charges: No      Relationship History:    Current marital status:       Natural Supports:   Mother, friends and other    Other natural supports:  Kids,     Employment History    Are you currently employed: No      Currently seeking employment: No      Future work goals:   On disability 2018, medical    Sources of income/financial support:  Social Security Disability (SSDI)     History:      Status: no history of [address] duty  Educational History:     Have you ever been diagnosed with a learning disability: No      Highest level of education:  Associates Degree    Have you ever had an IEP or 504-plan: No      Do you need assistance with reading or writing: No      Recommended Treatment:     Psychotherapy:  Individual sessions    Frequency:  2 times    Session frequency:  Monthly      Visit start and stop times:    12/06/23  Start Time: 1511  Stop Time: 1613  Total Visit Time: 62 minutes

## 2023-12-06 NOTE — ADDENDUM NOTE
Encounter addended by: Carloz Callejas MD on: 12/6/2023 10:45 AM   Actions taken: Clinical Note Signed, Visit diagnoses modified, Problem List modified

## 2023-12-07 ENCOUNTER — OFFICE VISIT (OUTPATIENT)
Dept: GASTROENTEROLOGY | Facility: CLINIC | Age: 48
End: 2023-12-07
Payer: COMMERCIAL

## 2023-12-07 VITALS
RESPIRATION RATE: 16 BRPM | OXYGEN SATURATION: 96 % | BODY MASS INDEX: 31.02 KG/M2 | WEIGHT: 158 LBS | HEART RATE: 96 BPM | TEMPERATURE: 97.3 F | DIASTOLIC BLOOD PRESSURE: 90 MMHG | HEIGHT: 60 IN | SYSTOLIC BLOOD PRESSURE: 118 MMHG

## 2023-12-07 DIAGNOSIS — D89.40 MAST CELL ACTIVATION SYNDROME (HCC): ICD-10-CM

## 2023-12-07 DIAGNOSIS — R19.7 DIARRHEA, UNSPECIFIED TYPE: ICD-10-CM

## 2023-12-07 DIAGNOSIS — K50.119 CROHN'S DISEASE OF COLON WITH COMPLICATION (HCC): Primary | ICD-10-CM

## 2023-12-07 DIAGNOSIS — E61.1 IRON DEFICIENCY: ICD-10-CM

## 2023-12-07 DIAGNOSIS — E44.1 MILD PROTEIN-CALORIE MALNUTRITION (HCC): ICD-10-CM

## 2023-12-07 DIAGNOSIS — D84.9 IMMUNOSUPPRESSED STATUS (HCC): ICD-10-CM

## 2023-12-07 PROCEDURE — 99214 OFFICE O/P EST MOD 30 MIN: CPT | Performed by: INTERNAL MEDICINE

## 2023-12-08 ENCOUNTER — HOSPITAL ENCOUNTER (OUTPATIENT)
Dept: INFUSION CENTER | Facility: HOSPITAL | Age: 48
End: 2023-12-08
Payer: COMMERCIAL

## 2023-12-08 ENCOUNTER — APPOINTMENT (OUTPATIENT)
Dept: LAB | Facility: HOSPITAL | Age: 48
End: 2023-12-08
Payer: COMMERCIAL

## 2023-12-08 VITALS
RESPIRATION RATE: 18 BRPM | SYSTOLIC BLOOD PRESSURE: 145 MMHG | TEMPERATURE: 97.1 F | DIASTOLIC BLOOD PRESSURE: 80 MMHG | OXYGEN SATURATION: 97 % | HEART RATE: 96 BPM

## 2023-12-08 DIAGNOSIS — E61.1 IRON DEFICIENCY: ICD-10-CM

## 2023-12-08 DIAGNOSIS — D89.40 MAST CELL ACTIVATION SYNDROME (HCC): ICD-10-CM

## 2023-12-08 DIAGNOSIS — R19.7 VOMITING AND DIARRHEA: Primary | ICD-10-CM

## 2023-12-08 DIAGNOSIS — D84.9 IMMUNOSUPPRESSED STATUS (HCC): ICD-10-CM

## 2023-12-08 DIAGNOSIS — E44.1 MILD PROTEIN-CALORIE MALNUTRITION (HCC): ICD-10-CM

## 2023-12-08 DIAGNOSIS — D50.0 IRON DEFICIENCY ANEMIA DUE TO CHRONIC BLOOD LOSS: ICD-10-CM

## 2023-12-08 DIAGNOSIS — R63.0 ANOREXIA: ICD-10-CM

## 2023-12-08 DIAGNOSIS — K50.119 CROHN'S DISEASE OF COLON WITH COMPLICATION (HCC): ICD-10-CM

## 2023-12-08 DIAGNOSIS — R19.7 DIARRHEA, UNSPECIFIED TYPE: ICD-10-CM

## 2023-12-08 DIAGNOSIS — R11.10 VOMITING AND DIARRHEA: Primary | ICD-10-CM

## 2023-12-08 LAB
ALBUMIN SERPL BCP-MCNC: 4 G/DL (ref 3.5–5)
ALP SERPL-CCNC: 67 U/L (ref 34–104)
ALT SERPL W P-5'-P-CCNC: 18 U/L (ref 7–52)
ANION GAP SERPL CALCULATED.3IONS-SCNC: 8 MMOL/L
AST SERPL W P-5'-P-CCNC: 16 U/L (ref 13–39)
BASOPHILS # BLD AUTO: 0.12 THOUSANDS/ÂΜL (ref 0–0.1)
BASOPHILS NFR BLD AUTO: 2 % (ref 0–1)
BILIRUB SERPL-MCNC: 0.33 MG/DL (ref 0.2–1)
BUN SERPL-MCNC: 12 MG/DL (ref 5–25)
CALCIUM SERPL-MCNC: 8.9 MG/DL (ref 8.4–10.2)
CHLORIDE SERPL-SCNC: 106 MMOL/L (ref 96–108)
CO2 SERPL-SCNC: 25 MMOL/L (ref 21–32)
CREAT SERPL-MCNC: 0.52 MG/DL (ref 0.6–1.3)
CRP SERPL QL: 3.3 MG/L
EOSINOPHIL # BLD AUTO: 0.2 THOUSAND/ÂΜL (ref 0–0.61)
EOSINOPHIL NFR BLD AUTO: 3 % (ref 0–6)
ERYTHROCYTE [DISTWIDTH] IN BLOOD BY AUTOMATED COUNT: 17.6 % (ref 11.6–15.1)
FERRITIN SERPL-MCNC: 359 NG/ML (ref 11–307)
GFR SERPL CREATININE-BSD FRML MDRD: 111 ML/MIN/1.73SQ M
GLUCOSE SERPL-MCNC: 86 MG/DL (ref 65–140)
HCT VFR BLD AUTO: 45.3 % (ref 34.8–46.1)
HGB BLD-MCNC: 14.8 G/DL (ref 11.5–15.4)
IMM GRANULOCYTES # BLD AUTO: 0.04 THOUSAND/UL (ref 0–0.2)
IMM GRANULOCYTES NFR BLD AUTO: 1 % (ref 0–2)
IRON SATN MFR SERPL: 40 % (ref 15–50)
IRON SERPL-MCNC: 124 UG/DL (ref 50–212)
LYMPHOCYTES # BLD AUTO: 1.93 THOUSANDS/ÂΜL (ref 0.6–4.47)
LYMPHOCYTES NFR BLD AUTO: 26 % (ref 14–44)
MCH RBC QN AUTO: 30 PG (ref 26.8–34.3)
MCHC RBC AUTO-ENTMCNC: 32.7 G/DL (ref 31.4–37.4)
MCV RBC AUTO: 92 FL (ref 82–98)
MONOCYTES # BLD AUTO: 0.57 THOUSAND/ÂΜL (ref 0.17–1.22)
MONOCYTES NFR BLD AUTO: 8 % (ref 4–12)
NEUTROPHILS # BLD AUTO: 4.58 THOUSANDS/ÂΜL (ref 1.85–7.62)
NEUTS SEG NFR BLD AUTO: 60 % (ref 43–75)
NRBC BLD AUTO-RTO: 1 /100 WBCS
PLATELET # BLD AUTO: 438 THOUSANDS/UL (ref 149–390)
PMV BLD AUTO: 8.9 FL (ref 8.9–12.7)
POTASSIUM SERPL-SCNC: 3.4 MMOL/L (ref 3.5–5.3)
PROT SERPL-MCNC: 7 G/DL (ref 6.4–8.4)
RBC # BLD AUTO: 4.94 MILLION/UL (ref 3.81–5.12)
SODIUM SERPL-SCNC: 139 MMOL/L (ref 135–147)
TIBC SERPL-MCNC: 307 UG/DL (ref 250–450)
UIBC SERPL-MCNC: 183 UG/DL (ref 155–355)
WBC # BLD AUTO: 7.44 THOUSAND/UL (ref 4.31–10.16)

## 2023-12-08 PROCEDURE — 85025 COMPLETE CBC W/AUTO DIFF WBC: CPT

## 2023-12-08 PROCEDURE — 86140 C-REACTIVE PROTEIN: CPT

## 2023-12-08 PROCEDURE — 96375 TX/PRO/DX INJ NEW DRUG ADDON: CPT

## 2023-12-08 PROCEDURE — 83550 IRON BINDING TEST: CPT

## 2023-12-08 PROCEDURE — 96401 CHEMO ANTI-NEOPL SQ/IM: CPT

## 2023-12-08 PROCEDURE — 96367 TX/PROPH/DG ADDL SEQ IV INF: CPT

## 2023-12-08 PROCEDURE — 83540 ASSAY OF IRON: CPT

## 2023-12-08 PROCEDURE — 82728 ASSAY OF FERRITIN: CPT

## 2023-12-08 PROCEDURE — 96365 THER/PROPH/DIAG IV INF INIT: CPT

## 2023-12-08 PROCEDURE — 86803 HEPATITIS C AB TEST: CPT

## 2023-12-08 PROCEDURE — 86705 HEP B CORE ANTIBODY IGM: CPT

## 2023-12-08 PROCEDURE — 86480 TB TEST CELL IMMUN MEASURE: CPT

## 2023-12-08 PROCEDURE — 80053 COMPREHEN METABOLIC PANEL: CPT

## 2023-12-08 PROCEDURE — 87340 HEPATITIS B SURFACE AG IA: CPT

## 2023-12-08 PROCEDURE — 86704 HEP B CORE ANTIBODY TOTAL: CPT

## 2023-12-08 PROCEDURE — 36415 COLL VENOUS BLD VENIPUNCTURE: CPT

## 2023-12-08 RX ORDER — METHYLPREDNISOLONE SODIUM SUCCINATE 40 MG/ML
40 INJECTION, POWDER, LYOPHILIZED, FOR SOLUTION INTRAMUSCULAR; INTRAVENOUS ONCE
Status: COMPLETED | OUTPATIENT
Start: 2023-12-08 | End: 2023-12-08

## 2023-12-08 RX ORDER — METHYLPREDNISOLONE SODIUM SUCCINATE 40 MG/ML
40 INJECTION, POWDER, LYOPHILIZED, FOR SOLUTION INTRAMUSCULAR; INTRAVENOUS ONCE
Start: 2023-12-12 | End: 2023-12-09

## 2023-12-08 RX ORDER — METHOTREXATE 25 MG/ML
25 INJECTION INTRA-ARTERIAL; INTRAMUSCULAR; INTRATHECAL; INTRAVENOUS ONCE
Status: COMPLETED | OUTPATIENT
Start: 2023-12-08 | End: 2023-12-08

## 2023-12-08 RX ADMIN — METHYLPREDNISOLONE SODIUM SUCCINATE 40 MG: 40 INJECTION, POWDER, FOR SOLUTION INTRAMUSCULAR; INTRAVENOUS at 13:09

## 2023-12-08 RX ADMIN — DIPHENHYDRAMINE HYDROCHLORIDE 50 MG: 50 INJECTION, SOLUTION INTRAMUSCULAR; INTRAVENOUS at 11:55

## 2023-12-08 RX ADMIN — SODIUM CHLORIDE 1000 ML: 0.9 INJECTION, SOLUTION INTRAVENOUS at 11:44

## 2023-12-08 RX ADMIN — METHOTREXATE 25 MG: 25 SOLUTION INTRA-ARTERIAL; INTRAMUSCULAR; INTRATHECAL; INTRAVENOUS at 13:49

## 2023-12-08 RX ADMIN — FAMOTIDINE 40 MG: 10 INJECTION INTRAVENOUS at 12:35

## 2023-12-08 NOTE — PROGRESS NOTES
Padmini Harper  tolerated treatment well with no complications. Padmini Harper is aware of future appt on 12/12 at 1330.      AVS printed and given to Padmini Harper:    No (Declined by Padmini Harper) X

## 2023-12-08 NOTE — PLAN OF CARE
Problem: Knowledge Deficit  Goal: Patient/family/caregiver demonstrates understanding of disease process, treatment plan, medications, and discharge instructions  Description: Complete learning assessment and assess knowledge base.   Interventions:  - Provide teaching at level of understanding  - Provide teaching via preferred learning methods  12/8/2023 1208 by Hoda Ortiz RN  Outcome: Progressing  12/8/2023 1206 by Hoda Ortiz RN  Outcome: Progressing     Problem: INFECTION - ADULT  Goal: Absence of fever/infection during neutropenic period  Description: INTERVENTIONS:  - Monitor WBC    12/8/2023 1208 by Hoda Ortiz RN  Outcome: Progressing  12/8/2023 1206 by Hoda Ortiz RN  Outcome: Progressing  Goal: Absence or prevention of progression during hospitalization  Description: INTERVENTIONS:  - Assess and monitor for signs and symptoms of infection  - Monitor lab/diagnostic results  - Monitor all insertion sites, i.e. indwelling lines, tubes, and drains  - Monitor endotracheal if appropriate and nasal secretions for changes in amount and color  - [place] appropriate cooling/warming therapies per order  - Administer medications as ordered  - Instruct and encourage patient and family to use good hand hygiene technique  - Identify and instruct in appropriate isolation precautions for identified infection/condition  Outcome: Progressing     Problem: DISCHARGE PLANNING  Goal: Discharge to home or other facility with appropriate resources  Description: INTERVENTIONS:  - Identify barriers to discharge w/patient and caregiver  - Arrange for needed discharge resources and transportation as appropriate  - Identify discharge learning needs (meds, wound care, etc.)  - Arrange for interpretive services to assist at discharge as needed  - Refer to Case Management Department for coordinating discharge planning if the patient needs post-hospital services based on physician/advanced practitioner order or complex needs related to functional status, cognitive ability, or social support system  Outcome: Progressing

## 2023-12-09 LAB
HBV CORE AB SER QL: NORMAL
HBV CORE IGM SER QL: NORMAL
HBV SURFACE AG SER QL: NORMAL
HCV AB SER QL: NORMAL

## 2023-12-10 LAB
GAMMA INTERFERON BACKGROUND BLD IA-ACNC: 0.01 IU/ML
M TB IFN-G BLD-IMP: NEGATIVE
M TB IFN-G CD4+ BCKGRND COR BLD-ACNC: 0 IU/ML
M TB IFN-G CD4+ BCKGRND COR BLD-ACNC: 0 IU/ML
MITOGEN IGNF BCKGRD COR BLD-ACNC: 9.99 IU/ML

## 2023-12-12 ENCOUNTER — APPOINTMENT (OUTPATIENT)
Dept: LAB | Facility: HOSPITAL | Age: 48
End: 2023-12-12
Payer: COMMERCIAL

## 2023-12-12 ENCOUNTER — NURSE TRIAGE (OUTPATIENT)
Age: 48
End: 2023-12-12

## 2023-12-12 ENCOUNTER — HOSPITAL ENCOUNTER (OUTPATIENT)
Dept: INFUSION CENTER | Facility: HOSPITAL | Age: 48
Discharge: HOME | End: 2023-12-12
Attending: INTERNAL MEDICINE
Payer: COMMERCIAL

## 2023-12-12 VITALS
TEMPERATURE: 98.5 F | DIASTOLIC BLOOD PRESSURE: 89 MMHG | RESPIRATION RATE: 18 BRPM | SYSTOLIC BLOOD PRESSURE: 128 MMHG | HEART RATE: 94 BPM

## 2023-12-12 DIAGNOSIS — K50.119 CROHN'S DISEASE OF COLON WITH COMPLICATION (HCC): ICD-10-CM

## 2023-12-12 DIAGNOSIS — E44.1 MILD PROTEIN-CALORIE MALNUTRITION (HCC): ICD-10-CM

## 2023-12-12 DIAGNOSIS — D84.9 IMMUNOSUPPRESSED STATUS (HCC): ICD-10-CM

## 2023-12-12 DIAGNOSIS — D50.0 IRON DEFICIENCY ANEMIA DUE TO CHRONIC BLOOD LOSS: ICD-10-CM

## 2023-12-12 DIAGNOSIS — R19.7 VOMITING AND DIARRHEA: Primary | ICD-10-CM

## 2023-12-12 DIAGNOSIS — E61.1 IRON DEFICIENCY: ICD-10-CM

## 2023-12-12 DIAGNOSIS — D89.40 MAST CELL ACTIVATION SYNDROME (HCC): ICD-10-CM

## 2023-12-12 DIAGNOSIS — R19.7 DIARRHEA, UNSPECIFIED TYPE: ICD-10-CM

## 2023-12-12 DIAGNOSIS — R11.10 VOMITING AND DIARRHEA: Primary | ICD-10-CM

## 2023-12-12 DIAGNOSIS — R63.0 ANOREXIA: ICD-10-CM

## 2023-12-12 PROCEDURE — 83993 ASSAY FOR CALPROTECTIN FECAL: CPT

## 2023-12-12 PROCEDURE — 87329 GIARDIA AG IA: CPT

## 2023-12-12 PROCEDURE — 96365 THER/PROPH/DIAG IV INF INIT: CPT

## 2023-12-12 PROCEDURE — 96367 TX/PROPH/DG ADDL SEQ IV INF: CPT

## 2023-12-12 PROCEDURE — 87177 OVA AND PARASITES SMEARS: CPT

## 2023-12-12 PROCEDURE — 87209 SMEAR COMPLEX STAIN: CPT

## 2023-12-12 PROCEDURE — 96375 TX/PRO/DX INJ NEW DRUG ADDON: CPT

## 2023-12-12 RX ORDER — METHYLPREDNISOLONE SODIUM SUCCINATE 40 MG/ML
40 INJECTION, POWDER, LYOPHILIZED, FOR SOLUTION INTRAMUSCULAR; INTRAVENOUS ONCE
Status: CANCELLED
Start: 2023-12-14 | End: 2023-12-13

## 2023-12-12 RX ORDER — METHYLPREDNISOLONE SODIUM SUCCINATE 40 MG/ML
40 INJECTION, POWDER, LYOPHILIZED, FOR SOLUTION INTRAMUSCULAR; INTRAVENOUS ONCE
Status: COMPLETED | OUTPATIENT
Start: 2023-12-12 | End: 2023-12-12

## 2023-12-12 RX ADMIN — FAMOTIDINE 40 MG: 10 INJECTION INTRAVENOUS at 15:02

## 2023-12-12 RX ADMIN — SODIUM CHLORIDE 1000 ML: 0.9 INJECTION, SOLUTION INTRAVENOUS at 14:10

## 2023-12-12 RX ADMIN — METHYLPREDNISOLONE SODIUM SUCCINATE 40 MG: 40 INJECTION, POWDER, FOR SOLUTION INTRAMUSCULAR; INTRAVENOUS at 15:43

## 2023-12-12 RX ADMIN — IRON SUCROSE 200 MG: 20 INJECTION, SOLUTION INTRAVENOUS at 15:43

## 2023-12-12 RX ADMIN — DIPHENHYDRAMINE HYDROCHLORIDE 50 MG: 50 INJECTION, SOLUTION INTRAMUSCULAR; INTRAVENOUS at 14:29

## 2023-12-12 NOTE — PROGRESS NOTES
Ashtyn Carrasco  tolerated treatment well with no complications. Ashtyn Carrasco is aware of future appt on 12/14/23 at 1300.      AVS printed and given to Ashtyn Carrasco:  No (Declined by Ashtyn Carrasco) x

## 2023-12-12 NOTE — TELEPHONE ENCOUNTER
Updated signature form for methotrexate order reviewed and signed by Dr. Austin Bloch. Faxed to [facility].

## 2023-12-12 NOTE — TELEPHONE ENCOUNTER
Answer Assessment - Initial Assessment Questions  1. REASON FOR CALL or QUESTION: "What is your reason for calling today?" or "How can I best help you?" or "What question do you have that I can help answer?"      CHAPO VIDA CALLING FROM [facility], NEEDS ORDER CLARIFICATION FOR PT METHOTRXATE INJECTION DATED 11/29/23, NEEDS DATE AND TIME FOR DR Robbin Oconnell, UNSURE WHO THE  SIGNATURE IS. TELEPHONE [phone] OPTION #2, Rickey Azar [phone]. Protocols used:  Information Only Call - No Triage-ADULT-[place]

## 2023-12-14 ENCOUNTER — HOSPITAL ENCOUNTER (OUTPATIENT)
Dept: INFUSION CENTER | Facility: HOSPITAL | Age: 48
Discharge: HOME | End: 2023-12-14
Attending: INTERNAL MEDICINE
Payer: COMMERCIAL

## 2023-12-14 VITALS
SYSTOLIC BLOOD PRESSURE: 122 MMHG | RESPIRATION RATE: 95 BRPM | DIASTOLIC BLOOD PRESSURE: 76 MMHG | HEART RATE: 95 BPM | TEMPERATURE: 97 F | OXYGEN SATURATION: 99 %

## 2023-12-14 DIAGNOSIS — D50.0 IRON DEFICIENCY ANEMIA DUE TO CHRONIC BLOOD LOSS: ICD-10-CM

## 2023-12-14 DIAGNOSIS — E44.1 MILD PROTEIN-CALORIE MALNUTRITION (HCC): ICD-10-CM

## 2023-12-14 DIAGNOSIS — R19.7 VOMITING AND DIARRHEA: Primary | ICD-10-CM

## 2023-12-14 DIAGNOSIS — R11.10 VOMITING AND DIARRHEA: Primary | ICD-10-CM

## 2023-12-14 DIAGNOSIS — R63.0 ANOREXIA: ICD-10-CM

## 2023-12-14 DIAGNOSIS — E61.1 IRON DEFICIENCY: ICD-10-CM

## 2023-12-14 PROCEDURE — 96375 TX/PRO/DX INJ NEW DRUG ADDON: CPT

## 2023-12-14 PROCEDURE — 96361 HYDRATE IV INFUSION ADD-ON: CPT

## 2023-12-14 PROCEDURE — 96401 CHEMO ANTI-NEOPL SQ/IM: CPT

## 2023-12-14 PROCEDURE — 96365 THER/PROPH/DIAG IV INF INIT: CPT

## 2023-12-14 RX ORDER — METHYLPREDNISOLONE SODIUM SUCCINATE 40 MG/ML
40 INJECTION, POWDER, LYOPHILIZED, FOR SOLUTION INTRAMUSCULAR; INTRAVENOUS ONCE
Status: CANCELLED
Start: 2023-12-18 | End: 2023-12-15

## 2023-12-14 RX ORDER — METHOTREXATE 25 MG/ML
25 INJECTION INTRA-ARTERIAL; INTRAMUSCULAR; INTRATHECAL; INTRAVENOUS ONCE
Status: COMPLETED | OUTPATIENT
Start: 2023-12-14 | End: 2023-12-14

## 2023-12-14 RX ORDER — METHYLPREDNISOLONE SODIUM SUCCINATE 40 MG/ML
40 INJECTION, POWDER, LYOPHILIZED, FOR SOLUTION INTRAMUSCULAR; INTRAVENOUS ONCE
Status: COMPLETED | OUTPATIENT
Start: 2023-12-14 | End: 2023-12-14

## 2023-12-14 RX ADMIN — SODIUM CHLORIDE 1000 ML: 0.9 INJECTION, SOLUTION INTRAVENOUS at 14:05

## 2023-12-14 RX ADMIN — DIPHENHYDRAMINE HYDROCHLORIDE 50 MG: 50 INJECTION, SOLUTION INTRAMUSCULAR; INTRAVENOUS at 14:54

## 2023-12-14 RX ADMIN — FAMOTIDINE 40 MG: 10 INJECTION INTRAVENOUS at 14:28

## 2023-12-14 RX ADMIN — METHOTREXATE 25 MG: 25 SOLUTION INTRA-ARTERIAL; INTRAMUSCULAR; INTRATHECAL; INTRAVENOUS at 16:32

## 2023-12-14 RX ADMIN — METHYLPREDNISOLONE SODIUM SUCCINATE 40 MG: 40 INJECTION, POWDER, FOR SOLUTION INTRAMUSCULAR; INTRAVENOUS at 15:43

## 2023-12-14 NOTE — PLAN OF CARE
Problem: INFECTION - ADULT  Goal: Absence of fever/infection during neutropenic period  Description: INTERVENTIONS:  - Monitor WBC    Outcome: Progressing

## 2023-12-14 NOTE — PROGRESS NOTES
Celine Roa  tolerated treatment well with no complications.      Celine Roa is aware of future appt on 12/18 at 12 noon.     AVS printed and given to Celine Roa:  No (Declined by Celine Roa) x

## 2023-12-15 LAB
CALPROTECTIN STL-MCNT: 1370 UG/G (ref 0–120)
G LAMBLIA AG STL QL IA: NEGATIVE
O+P STL CONC: NORMAL

## 2023-12-18 ENCOUNTER — HOSPITAL ENCOUNTER (OUTPATIENT)
Dept: INFUSION CENTER | Facility: HOSPITAL | Age: 48
Discharge: HOME | End: 2023-12-18
Attending: INTERNAL MEDICINE
Payer: COMMERCIAL

## 2023-12-18 VITALS
OXYGEN SATURATION: 96 % | RESPIRATION RATE: 16 BRPM | SYSTOLIC BLOOD PRESSURE: 119 MMHG | DIASTOLIC BLOOD PRESSURE: 78 MMHG | HEART RATE: 111 BPM | TEMPERATURE: 96.9 F

## 2023-12-18 DIAGNOSIS — R19.7 VOMITING AND DIARRHEA: Primary | ICD-10-CM

## 2023-12-18 DIAGNOSIS — D50.0 IRON DEFICIENCY ANEMIA DUE TO CHRONIC BLOOD LOSS: ICD-10-CM

## 2023-12-18 DIAGNOSIS — E61.1 IRON DEFICIENCY: ICD-10-CM

## 2023-12-18 DIAGNOSIS — R11.10 VOMITING AND DIARRHEA: Primary | ICD-10-CM

## 2023-12-18 DIAGNOSIS — R63.0 ANOREXIA: ICD-10-CM

## 2023-12-18 DIAGNOSIS — E44.1 MILD PROTEIN-CALORIE MALNUTRITION (HCC): ICD-10-CM

## 2023-12-18 PROCEDURE — 96375 TX/PRO/DX INJ NEW DRUG ADDON: CPT

## 2023-12-18 PROCEDURE — 96361 HYDRATE IV INFUSION ADD-ON: CPT

## 2023-12-18 PROCEDURE — 96365 THER/PROPH/DIAG IV INF INIT: CPT

## 2023-12-18 PROCEDURE — 96367 TX/PROPH/DG ADDL SEQ IV INF: CPT

## 2023-12-18 RX ORDER — EPINEPHRINE 1 MG/ML
0.3 INJECTION, SOLUTION, CONCENTRATE INTRAVENOUS
Status: CANCELLED | OUTPATIENT
Start: 2023-12-27

## 2023-12-18 RX ORDER — METHYLPREDNISOLONE SODIUM SUCCINATE 40 MG/ML
40 INJECTION, POWDER, LYOPHILIZED, FOR SOLUTION INTRAMUSCULAR; INTRAVENOUS ONCE
Status: CANCELLED
Start: 2023-12-21 | End: 2023-12-19

## 2023-12-18 RX ORDER — METHYLPREDNISOLONE SODIUM SUCCINATE 40 MG/ML
40 INJECTION, POWDER, LYOPHILIZED, FOR SOLUTION INTRAMUSCULAR; INTRAVENOUS ONCE
Status: COMPLETED | OUTPATIENT
Start: 2023-12-18 | End: 2023-12-18

## 2023-12-18 RX ADMIN — FAMOTIDINE 40 MG: 10 INJECTION INTRAVENOUS at 13:20

## 2023-12-18 RX ADMIN — IRON SUCROSE 200 MG: 20 INJECTION, SOLUTION INTRAVENOUS at 14:36

## 2023-12-18 RX ADMIN — METHYLPREDNISOLONE SODIUM SUCCINATE 40 MG: 40 INJECTION, POWDER, FOR SOLUTION INTRAMUSCULAR; INTRAVENOUS at 14:22

## 2023-12-18 RX ADMIN — DIPHENHYDRAMINE HYDROCHLORIDE 50 MG: 50 INJECTION, SOLUTION INTRAMUSCULAR; INTRAVENOUS at 13:50

## 2023-12-18 RX ADMIN — SODIUM CHLORIDE 1000 ML: 0.9 INJECTION, SOLUTION INTRAVENOUS at 12:48

## 2023-12-18 NOTE — PROGRESS NOTES
Celine Roa  tolerated treatment well with no complications.      Celine Roa is aware of future appt on 12/21/23 at 1130.     AVS printed and given to Celine Roa:   No (Declined by Celine Roa) x

## 2023-12-21 ENCOUNTER — HOSPITAL ENCOUNTER (OUTPATIENT)
Dept: INFUSION CENTER | Facility: HOSPITAL | Age: 48
Discharge: HOME | End: 2023-12-21
Attending: INTERNAL MEDICINE
Payer: COMMERCIAL

## 2023-12-21 VITALS
HEART RATE: 107 BPM | DIASTOLIC BLOOD PRESSURE: 73 MMHG | RESPIRATION RATE: 16 BRPM | TEMPERATURE: 97.8 F | SYSTOLIC BLOOD PRESSURE: 121 MMHG | OXYGEN SATURATION: 97 %

## 2023-12-21 DIAGNOSIS — R63.0 ANOREXIA: ICD-10-CM

## 2023-12-21 DIAGNOSIS — R19.7 VOMITING AND DIARRHEA: Primary | ICD-10-CM

## 2023-12-21 DIAGNOSIS — E44.1 MILD PROTEIN-CALORIE MALNUTRITION (HCC): ICD-10-CM

## 2023-12-21 DIAGNOSIS — R11.10 VOMITING AND DIARRHEA: Primary | ICD-10-CM

## 2023-12-21 DIAGNOSIS — D50.0 IRON DEFICIENCY ANEMIA DUE TO CHRONIC BLOOD LOSS: ICD-10-CM

## 2023-12-21 DIAGNOSIS — E61.1 IRON DEFICIENCY: ICD-10-CM

## 2023-12-21 PROCEDURE — 96365 THER/PROPH/DIAG IV INF INIT: CPT

## 2023-12-21 PROCEDURE — 96375 TX/PRO/DX INJ NEW DRUG ADDON: CPT

## 2023-12-21 PROCEDURE — 96401 CHEMO ANTI-NEOPL SQ/IM: CPT

## 2023-12-21 PROCEDURE — 96361 HYDRATE IV INFUSION ADD-ON: CPT

## 2023-12-21 PROCEDURE — 96367 TX/PROPH/DG ADDL SEQ IV INF: CPT

## 2023-12-21 RX ORDER — METHYLPREDNISOLONE SODIUM SUCCINATE 40 MG/ML
40 INJECTION, POWDER, LYOPHILIZED, FOR SOLUTION INTRAMUSCULAR; INTRAVENOUS ONCE
Status: CANCELLED
Start: 2023-12-22 | End: 2023-12-22

## 2023-12-21 RX ORDER — METHYLPREDNISOLONE SODIUM SUCCINATE 40 MG/ML
40 INJECTION, POWDER, LYOPHILIZED, FOR SOLUTION INTRAMUSCULAR; INTRAVENOUS ONCE
Status: COMPLETED | OUTPATIENT
Start: 2023-12-21 | End: 2023-12-21

## 2023-12-21 RX ORDER — METHOTREXATE 25 MG/ML
25 INJECTION INTRA-ARTERIAL; INTRAMUSCULAR; INTRATHECAL; INTRAVENOUS ONCE
Qty: 2 ML | Refills: 0 | Status: COMPLETED | OUTPATIENT
Start: 2023-12-21 | End: 2023-12-21

## 2023-12-21 RX ADMIN — METHYLPREDNISOLONE SODIUM SUCCINATE 40 MG: 40 INJECTION, POWDER, FOR SOLUTION INTRAMUSCULAR; INTRAVENOUS at 15:26

## 2023-12-21 RX ADMIN — METHOTREXATE 25 MG: 25 SOLUTION INTRA-ARTERIAL; INTRAMUSCULAR; INTRATHECAL; INTRAVENOUS at 15:49

## 2023-12-21 RX ADMIN — FAMOTIDINE 40 MG: 10 INJECTION INTRAVENOUS at 13:34

## 2023-12-21 RX ADMIN — DIPHENHYDRAMINE HYDROCHLORIDE 50 MG: 50 INJECTION, SOLUTION INTRAMUSCULAR; INTRAVENOUS at 14:04

## 2023-12-21 RX ADMIN — SODIUM CHLORIDE 1000 ML: 0.9 INJECTION, SOLUTION INTRAVENOUS at 12:50

## 2023-12-21 NOTE — PROGRESS NOTES
Celine Roa  tolerated treatment well with no complications.      Celine Roa is aware of future appt on 12/26/23 at 1300.     AVS printed and given to Celine Roa:   No (Declined by Celine Roa) x

## 2023-12-22 ENCOUNTER — TELEMEDICINE (OUTPATIENT)
Dept: BEHAVIORAL/MENTAL HEALTH CLINIC | Facility: CLINIC | Age: 48
End: 2023-12-22
Payer: COMMERCIAL

## 2023-12-22 ENCOUNTER — HOSPITAL ENCOUNTER (OUTPATIENT)
Dept: INFUSION CENTER | Facility: HOSPITAL | Age: 48
End: 2023-12-22
Attending: INTERNAL MEDICINE
Payer: COMMERCIAL

## 2023-12-22 VITALS
HEART RATE: 92 BPM | DIASTOLIC BLOOD PRESSURE: 75 MMHG | TEMPERATURE: 97.5 F | SYSTOLIC BLOOD PRESSURE: 121 MMHG | RESPIRATION RATE: 18 BRPM

## 2023-12-22 DIAGNOSIS — F41.1 GENERALIZED ANXIETY DISORDER: Chronic | ICD-10-CM

## 2023-12-22 DIAGNOSIS — F06.31 DEPRESSION DUE TO PHYSICAL ILLNESS: Primary | ICD-10-CM

## 2023-12-22 DIAGNOSIS — K50.119 CROHN'S DISEASE OF COLON WITH COMPLICATION (HCC): Primary | ICD-10-CM

## 2023-12-22 PROCEDURE — 90834 PSYTX W PT 45 MINUTES: CPT | Performed by: SOCIAL WORKER

## 2023-12-22 RX ADMIN — RISANKIZUMAB-RZAA 360 MG: KIT at 15:13

## 2023-12-22 NOTE — BH TREATMENT PLAN
Outpatient Behavioral Health Psychotherapy Treatment Plan    Celine M Crispin  1975     Date of Initial Psychotherapy Assessment: 12/6/23   Date of Current Treatment Plan: 12/22/23  Treatment Plan Target Date: 6/24  Treatment Plan Expiration Date: 6/24    Diagnosis:   1. Depression due to physical illness        2. Generalized anxiety disorder            Area(s) of Need: I have uncontrolled Chron's disease.    Long Term Goal 1 (in the client's own words): I want to manage my Chron's disease.    Stage of Change: Action    Target Date for completion: 6/2     Anticipated therapeutic modalities: CBT     People identified to complete this goal: Celine      Objective 1:  l will go to the Dr's appts and follow their recommendations.     I will follow the diet restrictions.     I will take my meds.     I will let things that I can let go.     I will walk regularly.     I continue on getting my dog trained as a therapy dog.     Once dog is trained I will volunteer with my dog to help others.     I will listen to music, gardening.     I will use the Facebook groups for support.     I will continue to do my own research.          Long Term Goal 2 (in the client's own words): n    Stage of Change: Preparation    Target Date for completion: na     Anticipated therapeutic modalities: na     People identified to complete this goal: na      Objective 1: (identify the means of measuring success in meeting the objective): na      Objective 2: (identify the means of measuring success in meeting the objective): na     Long Term Goal 3 (in the client's own words): na    Stage of Change: Preparation    Target Date for completion: na     Anticipated therapeutic modalities: na     People identified to complete this goal: na      Objective 1: (identify the means of measuring success in meeting the objective): na      Objective 2: (identify the means of measuring success in meeting the objective): na     I am currently under the care of a   [facility] psychiatric provider: yes    My [facility] psychiatric provider is: Dr. Lewis    I am currently taking psychiatric medications: Yes, as prescribed    I feel that I will be ready for discharge from mental health care when I reach the following (measurable goal/objective): When I meet all goals and have no further goals to address.    For children and adults who have a legal guardian:   Has there been any change to custody orders and/or guardianship status? NA. If yes, attach updated documentation.    I have created my Crisis Plan and have been offered a copy of this plan    Behavioral Health Treatment Plan [facility]: Diagnosis and Treatment Plan explained to Celine Roa acknowledges an understanding of their diagnosis. Celine Roa agrees to this treatment plan.    I have been offered a copy of this Treatment Plan. yes

## 2023-12-22 NOTE — PSYCH
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 Generalized anxiety disorder            Goals addressed in session: Goal 1     DATA: MSW met with Celine for session.  She has a tx scheduled today for her Chron's that it took her two weeks to get it scheduled for today.  It was to be done 2 weeks ago.   \"And they wonder why they can get her Chron's under control.\"  Reviewed hx and developed tx plan and crisi plan.      During this session, this clinician used the following therapeutic modalities: Cognitive Behavioral Therapy and Supportive Psychotherapy    Substance Abuse was not addressed during this session. If the client is diagnosed with a co-occurring substance use disorder, please indicate any changes in the frequency or amount of use: NA. Stage of change for addressing substance use diagnoses: No substance use/Not applicable    ASSESSMENT:  Celine Roa presents with a Euthymic/ normal mood.     her affect is Normal range and intensity, which is congruent, with her mood and the content of the session. The client has made progress on their goals.     Celine Roa presents with a none risk of suicide, none risk of self-harm, and none risk of harm to others.    For any risk assessment that surpasses a \"low\" rating, a safety plan must be developed.    A safety plan was indicated: no  If yes, describe in detail     PLAN: Between sessions, Celine Roa will address her tx plan goals.. At the next session, the therapist will use Cognitive Behavioral Therapy and Supportive Psychotherapy to address tx plan goals and issues that arise in between sessions.    Behavioral Health Treatment Plan and Discharge Planning: Celine Roa is aware of and agrees to continue to work on their treatment plan. They have identified and are working toward their discharge goals. yes    Visit start and stop times:    12/22/23  Start Time: " 1312  Stop Time: 1404  Total Visit Time: 52 minutes    Virtual Regular Visit    Verification of patient location:    Patient is located at Home in the following state in which I hold an active license [place]      Assessment/Plan:    Problem List Items Addressed This Visit       Generalized anxiety disorder (Chronic)    Depression due to physical illness - Primary       Goals addressed in session: Goal 1          Reason for visit is   Chief Complaint   Patient presents with    Virtual Regular Visit          Encounter provider John Black    Provider located at [facility]  [facility] [place] [address] [phone]      Recent Visits  No visits were found meeting these conditions.  Showing recent visits within past 7 days and meeting all other requirements  Today's Visits  Date Type Provider Dept   12/22/23 Telemedicine John [facility] [place]   Showing today's visits and meeting all other requirements  Future Appointments  No visits were found meeting these conditions.  Showing future appointments within next 150 days and meeting all other requirements       The patient was identified by name and date of birth. Celine Roa was informed that this is a telemedicine visit and that the visit is being conducted throughthe [employer] platform. She agrees to proceed..  My office door was closed. No one else was in the room.  She acknowledged consent and understanding of privacy and security of the video platform. The patient has agreed to participate and understands they can discontinue the visit at any time.    Patient is aware this is a billable service.     Subjective  Celine Roa is a 48 y.o. female  .      HPI     Past Medical History:   Diagnosis Date    Anemia 2007    Anxiety     Asthma     Bile duct stricture 2014    Sphincter of oddi dysfunction    Chronic kidney disease     Colon polyp 1998     Crohn's colitis (HCC)     Deep vein thrombosis (HCC)     Disease of thyroid gland     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 pt denies, documented in cardio note    Pancreatitis     sphinger of     Psychiatric disorder     RA (rheumatoid arthritis) (HCC)     Seizures (HCC)     Ulcerative colitis (HCC)     Visual impairment        Past Surgical History:   Procedure Laterality Date    ABDOMINAL SURGERY  2017    APPENDECTOMY      CHOLECYSTECTOMY      COLONOSCOPY  2019    CYSTOSCOPY N/A 6/8/2023    Procedure: CYSTOSCOPY, mini TURBT with fulgeration;  Surgeon: Tee Bruno MD;  Location: [facility];  Service: Urology    EGD AND COLONOSCOPY N/A 09/12/2017    Procedure: EGD AND COLONOSCOPY;  Surgeon: Tommy Oliver MD;  Location: [facility];  Service: Gastroenterology    ERCP N/A 09/15/2017    Procedure: ENDOSCOPIC RETROGRADE CHOLANGIOPANCREATOGRAPHY (ERCP);  Surgeon: Dre Soto MD;  [facility];  Service: Gastroenterology    HYSTERECTOMY      KNEE SURGERY Right     LAPAROSCOPIC ENDOMETRIOSIS FULGURATION      ORIF ANKLE FRACTURE Left     AL ARTHRS KNE SURG W/MENISCECTOMY MED/LAT W/SHVG Left 05/12/2017    Procedure: POSSIBLE MEDIAL MENISECTOMY;  Surgeon: Kristian Avila MD;  Location: [facility];  Service: Orthopedics    AL ARTHRS KNEE DEBRIDEMENT/SHAVING ARTCLR CRTLG Left 05/12/2017    Procedure: KNEE ARTHROSCOPY EVALUATION, CHONDROPLASTY;  Surgeon: Kristian Avila MD;  Location: [facility];  Service: Orthopedics    AL LAPS ABD PRTM&OMENTUM DX W/WO SPEC BR/WA SPX N/A 12/12/2017    Procedure: LAPAROSCOPY DIAGNOSTIC  WITH LYSIS OF ADHESIONS;  Surgeon: Olaf John DO;  [facility];  Service:  "General    UPPER GASTROINTESTINAL ENDOSCOPY  2019       Current Outpatient Medications   Medication Sig Dispense Refill    Ascorbic Acid (vitamin C) 1000 MG tablet Take 1,000 mg by mouth 2 (two) times a day      B-D 3CC LUER-SHAMEKA SYR 25GX1\" 25G X 1\" 3 ML MISC       butalbital-acetaminophen-caffeine (FIORICET,ESGIC) -40 mg per tablet Take 1 tablet by mouth every 8 (eight) hours as needed for migraine 20 tablet 1    cetirizine (ZyrTEC) 10 mg tablet Take 20 mg by mouth daily      cholecalciferol (VITAMIN D3) 1,000 units tablet Take 1,000 Units by mouth 2 (two) times a day      Cholecalciferol 10 MCG /0.025ML LIQD Take 1,000 Units by mouth 2 (two) times a day 750 mL 1    cyanocobalamin (VITAMIN B-12) 100 mcg tablet Take by mouth daily      desvenlafaxine succinate 25 MG TB24 Take 1 tablet (25 mg total) by mouth daily 30 tablet 1    diclofenac (VOLTAREN) 75 mg EC tablet       EpiPen 2-Malachi 0.3 MG/0.3ML SOAJ INJECT 1 PEN INTO THE MUSCLE AS NEEDED FOR ANAPHALAXIS      famotidine (PEPCID) 40 MG tablet Take 0.5 tablets (20 mg total) by mouth daily 90 tablet 0    folic acid (KP Folic Acid) 1 mg tablet Take 1 tablet (1 mg total) by mouth daily 30 tablet 3    hydrOXYzine HCL (ATARAX) 25 mg tablet       Lactobacillus (PROBIOTIC ACIDOPHILUS PO) Take by mouth      Lactobacillus-Inulin ([facility]) CAPS Take 200 mg by mouth daily      levalbuterol (XOPENEX HFA) 45 mcg/act inhaler Inhale 1-2 puffs every 4 (four) hours as needed for shortness of breath 45 g 2    levothyroxine 100 mcg tablet Take 100 mcg by mouth every other day      liothyronine (CYTOMEL) 5 mcg tablet Take 1 tablet (5 mcg total) by mouth daily Do not start before June 14, 2023. 30 tablet 0    magnesium gluconate (MAGONATE) 500 mg tablet Take 500 mg by mouth 2 (two) times a day      Melatonin 3 MG SUBL       melatonin 3 mg Take 1 tablet (3 mg total) by mouth daily at bedtime 30 tablet 0    methotrexate 50 MG/2ML injection Inject 1 mL (25 mg total) " "under the skin once a week 4 mL 10    methotrexate 50 MG/2ML injection Inject 1 mL (25 mg total) under the skin once a week 4 mL 10    mirtazapine (REMERON) 7.5 MG tablet Take 1 tablet (7.5 mg total) by mouth daily at bedtime 30 tablet 1    montelukast (SINGULAIR) 10 mg tablet Take 1 tablet (10 mg total) by mouth daily at bedtime  0    NEEDLE, DISP, 27 G (B-D DISP NEEDLE 31AJ4-2/4\") 27G X 1-1/4\" MISC Use once a week 100 each 0    NON FORMULARY immunoglobin sq 6g 30 ml once a week      nystatin (MYCOSTATIN) 500,000 units/5 mL suspension Swish and spit 5 mL (500,000 Units total) 4 (four) times a day (Patient not taking: Reported on 9/13/2023) 473 mL 0    omalizumab (XOLAIR) 150 mg Inject 2.4 mL (300 mg total) under the skin every 21 days 1 each 16    pantoprazole (PROTONIX) 40 mg tablet TAKE ONE TABLET BY MOUTH TWICE A DAY (Patient not taking: Reported on 11/27/2023) 60 tablet 5    predniSONE 20 mg tablet Take 1 tablet (20 mg total) by mouth daily Take as instructed by physician. 20 mg three times a day for 3 days, then 20 mg twice daily 60 tablet 0    Riboflavin (VITAMIN B-2 PO) Take by mouth      Riboflavin (Vitamin B-2) 100 MG TABS Take 100 mg by mouth 2 (two) times a day      risankizumab-rzaa (Skyrizi) 360 MG/2.4ML SOCT Take first on body injector (OBI) under skin on week 12 then every 8 weeks there after 2.4 mL 5    TechLite Lancets MISC       traMADol (Ultram) 50 mg tablet Take 1 tablet (50 mg total) by mouth every 12 (twelve) hours as needed for moderate pain or severe pain (Patient not taking: Reported on 9/14/2023) 60 tablet 0     No current facility-administered medications for this visit.        Allergies   Allergen Reactions    Aimovig [Erenumab-Aooe] Anaphylaxis    Amitriptyline Anaphylaxis     According to the patient she had nphylaxis    Aspergillus Allergy Skin Test Other (See Comments), Hives and Swelling    Azathioprine Other (See Comments) and Anaphylaxis     pancreatitis  According to patient she " needed Epipen    Banana - Food Allergy Anaphylaxis, Itching, Swelling and Tongue Swelling    Buspar [Buspirone] Hives and Shortness Of Breath    Citric Acid-Polysorbate 80 Abdominal Pain, Anaphylaxis, Anxiety, Bradycardia, Diarrhea, Fatigue, GI Intolerance, Headache, Hives, Hyperactivity, Itching, Lip Swelling, Nausea Only, Palpitations, Rash, Shortness Of Breath, Tachycardia, Throat Swelling, Tongue Swelling and Vomiting    Corn Dextrin Anaphylaxis     Any corn based products    Eggs Or Egg-Derived Products - Food Allergy Anaphylaxis    Epinephrine Anaphylaxis    Erenumab Anaphylaxis     patient sent portal message stating she had anaphylaxis  patient sent portal message stating she had anaphylaxis      Gadolinium Abdominal Pain, Anaphylaxis, Anxiety, Confusion, Delirium, Dizziness, Eye Swelling, Fatigue, GI Intolerance, Headache, Hives, Irritability, Itching, Lip Swelling, Nausea Only, Palpitations, Photosensitivity, Rash, Seizures, Shortness Of Breath, Swelling, Syncope, Throat Swelling, Tongue Swelling, Tremor, Visual Disturbance and Vomiting    Gelatin - Food Allergy Anaphylaxis    Heparin Hives     Painful welts     Lactated Ringers Shortness Of Breath     Pt questions this allergy, pt has received LR multiple times without reaction    Lavender Oil Anaphylaxis     Other reaction(s): anaphalxis    Malto Dextrin [Dextrin] Anaphylaxis    Other Anaphylaxis     Gel caps, maltodextrose, dextrose,grapes, lavender     Penicillium Notatum Shortness Of Breath and Swelling    Red Dye - Food Allergy Anaphylaxis and Other (See Comments)     intolerance    Sumatriptan Anaphylaxis     Bradycardia, sob, back pressure, head pain,throat tightness  According to the patient she had anphylaxis    Ustekinumab Anaphylaxis    Contrast [Iodinated Contrast Media] Other (See Comments)     Pt states needs to be premedicated prior to injection    Corn Oil - Food Allergy - Food Allergy Hives    Humira [Adalimumab] Other (See Comments)      "\"I develop drug induced lupus\"    Ibuprofen Hives and Throat Swelling    Polyethylene Glycol Diarrhea and Vomiting    Remicade [Infliximab] Other (See Comments)     \"I develop drug induced lupus\"    Soy Allergy - Food Allergy Other (See Comments)    Sulfa Antibiotics Hives and Other (See Comments)    Sulfate Hives    Sulfites - Food Allergy Hives    Sweet Corn Extract Skin Test - Food Allergy Hives and Itching    Chlorhexidine Itching    Freederm Adhesive Remover [New Skin] Rash    Histamine Hives, Rash and Other (See Comments)     Intolerance      Wound Dressings Rash       Review of Systems    Video Exam    There were no vitals filed for this visit.    Physical Exam         "

## 2023-12-22 NOTE — PROGRESS NOTES
Celine Roa  tolerated treatment well with no complications.      Celine Roa is aware of future appt on 12/26/23 at 1.     AVS printed and given to Celine Roa:    No (Declined by Celine Roa) x

## 2023-12-22 NOTE — BH CRISIS PLAN
"Client Name: Celine Roa       Client YOB: 1975  : 1975    Treatment Team (include name and contact information):     Psychotherapist: John Black LCSW    Psychiatrist: Dr. Lewis   Release of information completed: yes,[facility]      Healthcare Provider  Olaf Rhodes MD  [address] [address]  [phone]    Type of Plan   * Child plans (children 14 yo and younger) must be completed and signed by the child's legal guardian   * Plans for all individuals 15 yo and above must be signed by the client.     Plan Type: adolescent/adult (14 and over) Initial      My Personal Strengths are (in the client's own words):  \"Very caring and very helpful\"  The stressors and triggers that may put me at risk are:  medical problems    Coping skills I can use to keep myself calm and safe:  Listen to music, Physical activity, Call a friend or family member, and Other (describe) gardening    Coping skills/supports I can use to maintain abstinence from substance use:   Not Applicable    The people that provide me with help and support: (Include name, contact, and how they can help)   Support person #1:     * Phone number: in cell phone    * How can they help me? talking   Support person #2:mother    * Phone number: in cell phone    * How can they help me? talking     Support person #3: friends    * Phone number: in cell phone    * How can they help me? talking    In the past, the following has helped me in times of crisis:    Taking medications, Calling a friend, Calling a family member, Taking a walk or exercising, Listening to music, and Other: gardening      If it is an emergency and you need immediate help, call     If there is a possibility of danger to yourself or others, call the following crisis hotline resources:     Adult Crisis Numbers  Suicide Prevention Hotline - Dial   [facility]: [phone]  [facility]: [phone]  [facility]: " [phone]  [facility]: [phone]  [facility]: [phone]  [facility]: [phone]  [facility]: [phone]  [facility]: [phone] (daytime).       [phone] (after hours, weekends, holidays)     Child/Adolescent Crisis Numbers   [facility]: [phone]   [facility]: [phone]   [place], [place]: [phone]   [facility]: [phone]    Please note: Some [facility] do not have a separate number for Child/Adolescent specific crisis. If your county is not listed under Child/Adolescent, please call the adult number for your county     National Talk to Text Line   All Ages - [phone]    In the event your feelings become unmanageable, and you cannot reach your support system, you will call 911 immediately or go to the nearest hospital emergency room.

## 2023-12-27 RX ORDER — METHOTREXATE 25 MG/ML
25 INJECTION INTRA-ARTERIAL; INTRAMUSCULAR; INTRATHECAL; INTRAVENOUS ONCE
Qty: 2 ML | Refills: 0 | Status: DISCONTINUED | OUTPATIENT
Start: 2023-12-29 | End: 2024-01-01 | Stop reason: HOSPADM

## 2023-12-29 ENCOUNTER — HOSPITAL ENCOUNTER (OUTPATIENT)
Dept: INFUSION CENTER | Facility: HOSPITAL | Age: 48
End: 2023-12-29
Attending: INTERNAL MEDICINE
Payer: COMMERCIAL

## 2023-12-29 VITALS
HEART RATE: 99 BPM | RESPIRATION RATE: 17 BRPM | TEMPERATURE: 97.8 F | SYSTOLIC BLOOD PRESSURE: 109 MMHG | DIASTOLIC BLOOD PRESSURE: 72 MMHG | OXYGEN SATURATION: 97 %

## 2023-12-29 DIAGNOSIS — R19.7 VOMITING AND DIARRHEA: ICD-10-CM

## 2023-12-29 DIAGNOSIS — R63.0 ANOREXIA: ICD-10-CM

## 2023-12-29 DIAGNOSIS — E61.1 IRON DEFICIENCY: ICD-10-CM

## 2023-12-29 DIAGNOSIS — E44.1 MILD PROTEIN-CALORIE MALNUTRITION (HCC): ICD-10-CM

## 2023-12-29 DIAGNOSIS — R11.10 VOMITING AND DIARRHEA: ICD-10-CM

## 2023-12-29 DIAGNOSIS — L50.1 CHRONIC IDIOPATHIC URTICARIA: Primary | ICD-10-CM

## 2023-12-29 DIAGNOSIS — D89.40 MAST CELL ACTIVATION SYNDROME (HCC): ICD-10-CM

## 2023-12-29 DIAGNOSIS — D50.0 IRON DEFICIENCY ANEMIA DUE TO CHRONIC BLOOD LOSS: ICD-10-CM

## 2023-12-29 PROCEDURE — 96361 HYDRATE IV INFUSION ADD-ON: CPT

## 2023-12-29 PROCEDURE — 96372 THER/PROPH/DIAG INJ SC/IM: CPT

## 2023-12-29 PROCEDURE — 96367 TX/PROPH/DG ADDL SEQ IV INF: CPT

## 2023-12-29 PROCEDURE — 96365 THER/PROPH/DIAG IV INF INIT: CPT

## 2023-12-29 PROCEDURE — 96375 TX/PRO/DX INJ NEW DRUG ADDON: CPT

## 2023-12-29 RX ORDER — EPINEPHRINE 1 MG/ML
0.3 INJECTION, SOLUTION, CONCENTRATE INTRAVENOUS
OUTPATIENT
Start: 2024-01-24

## 2023-12-29 RX ORDER — METHYLPREDNISOLONE SODIUM SUCCINATE 40 MG/ML
40 INJECTION, POWDER, LYOPHILIZED, FOR SOLUTION INTRAMUSCULAR; INTRAVENOUS ONCE
Status: COMPLETED | OUTPATIENT
Start: 2023-12-29 | End: 2023-12-29

## 2023-12-29 RX ORDER — EPINEPHRINE 1 MG/ML
0.3 INJECTION, SOLUTION, CONCENTRATE INTRAVENOUS
Status: DISCONTINUED | OUTPATIENT
Start: 2023-12-29 | End: 2024-01-01 | Stop reason: HOSPADM

## 2023-12-29 RX ORDER — FAMOTIDINE 40 MG/1
40 TABLET, FILM COATED ORAL DAILY
Qty: 90 TABLET | Refills: 1 | Status: SHIPPED | OUTPATIENT
Start: 2023-12-29

## 2023-12-29 RX ORDER — METHYLPREDNISOLONE SODIUM SUCCINATE 40 MG/ML
40 INJECTION, POWDER, LYOPHILIZED, FOR SOLUTION INTRAMUSCULAR; INTRAVENOUS ONCE
Status: CANCELLED
Start: 2024-01-02 | End: 2023-12-30

## 2023-12-29 RX ADMIN — OMALIZUMAB 300 MG: 150 INJECTION, SOLUTION SUBCUTANEOUS at 13:24

## 2023-12-29 RX ADMIN — METHYLPREDNISOLONE SODIUM SUCCINATE 40 MG: 40 INJECTION, POWDER, FOR SOLUTION INTRAMUSCULAR; INTRAVENOUS at 13:02

## 2023-12-29 RX ADMIN — SODIUM CHLORIDE 1000 ML: 0.9 INJECTION, SOLUTION INTRAVENOUS at 10:30

## 2023-12-29 RX ADMIN — FAMOTIDINE 40 MG: 10 INJECTION INTRAVENOUS at 12:31

## 2023-12-29 RX ADMIN — DIPHENHYDRAMINE HYDROCHLORIDE 50 MG: 50 INJECTION, SOLUTION INTRAMUSCULAR; INTRAVENOUS at 11:55

## 2023-12-29 NOTE — PROGRESS NOTES
Pt had cancelled her infusion appointment on 12/26 due to her son testing positive for COVID. She states that she does NOT want her methotrexate injection today as her Xolair is due this week. She has methotrexate at home that was shipped to her. She is aware of her dose and how to draw up her medication and her  knows how to give the injection. She states she will take her methotrexate injection at home this week. She also wants to postpone her venofer to Tuesday 1/2/24. She does not like to get 2 medications (other than premeds) on the same day because if she has a reaction she will not know  which drug would be the cause. Pt is agreeable to Xolair, and pre meds of Benadryl, methylprednisolone and pepcid.

## 2024-01-02 ENCOUNTER — HOSPITAL ENCOUNTER (OUTPATIENT)
Dept: INFUSION CENTER | Facility: HOSPITAL | Age: 49
Discharge: HOME | End: 2024-01-02
Attending: INTERNAL MEDICINE
Payer: COMMERCIAL

## 2024-01-02 VITALS
DIASTOLIC BLOOD PRESSURE: 80 MMHG | RESPIRATION RATE: 18 BRPM | SYSTOLIC BLOOD PRESSURE: 114 MMHG | TEMPERATURE: 98.1 F | HEART RATE: 104 BPM

## 2024-01-02 DIAGNOSIS — R63.0 ANOREXIA: ICD-10-CM

## 2024-01-02 DIAGNOSIS — R11.10 VOMITING AND DIARRHEA: Primary | ICD-10-CM

## 2024-01-02 DIAGNOSIS — E61.1 IRON DEFICIENCY: ICD-10-CM

## 2024-01-02 DIAGNOSIS — E44.1 MILD PROTEIN-CALORIE MALNUTRITION (HCC): ICD-10-CM

## 2024-01-02 DIAGNOSIS — R19.7 VOMITING AND DIARRHEA: Primary | ICD-10-CM

## 2024-01-02 DIAGNOSIS — D50.0 IRON DEFICIENCY ANEMIA DUE TO CHRONIC BLOOD LOSS: ICD-10-CM

## 2024-01-02 RX ORDER — METHYLPREDNISOLONE SODIUM SUCCINATE 40 MG/ML
40 INJECTION, POWDER, LYOPHILIZED, FOR SOLUTION INTRAMUSCULAR; INTRAVENOUS ONCE
Status: CANCELLED
Start: 2024-01-05 | End: 2024-01-03

## 2024-01-02 RX ORDER — METHYLPREDNISOLONE SODIUM SUCCINATE 40 MG/ML
40 INJECTION, POWDER, LYOPHILIZED, FOR SOLUTION INTRAMUSCULAR; INTRAVENOUS ONCE
Status: COMPLETED | OUTPATIENT
Start: 2024-01-02 | End: 2024-01-02

## 2024-01-02 RX ADMIN — METHYLPREDNISOLONE SODIUM SUCCINATE 40 MG: 40 INJECTION, POWDER, FOR SOLUTION INTRAMUSCULAR; INTRAVENOUS at 13:46

## 2024-01-02 RX ADMIN — IRON SUCROSE 200 MG: 20 INJECTION, SOLUTION INTRAVENOUS at 13:54

## 2024-01-02 RX ADMIN — DIPHENHYDRAMINE HYDROCHLORIDE 50 MG: 50 INJECTION, SOLUTION INTRAMUSCULAR; INTRAVENOUS at 12:19

## 2024-01-02 RX ADMIN — SODIUM CHLORIDE 1000 ML: 0.9 INJECTION, SOLUTION INTRAVENOUS at 12:05

## 2024-01-02 RX ADMIN — FAMOTIDINE 40 MG: 10 INJECTION INTRAVENOUS at 13:09

## 2024-01-02 NOTE — PROGRESS NOTES
Celine Roa  tolerated treatment well with no complications.      Celine Roa is aware of future appt on 1/5/24 at 1100.     AVS printed and given to Celine Roa:  No (Declined by Celine Roa) x

## 2024-01-04 RX ORDER — METHOTREXATE 25 MG/ML
25 INJECTION INTRA-ARTERIAL; INTRAMUSCULAR; INTRATHECAL; INTRAVENOUS ONCE
Status: COMPLETED | OUTPATIENT
Start: 2024-01-05 | End: 2024-01-05

## 2024-01-05 ENCOUNTER — TELEPHONE (OUTPATIENT)
Dept: BEHAVIORAL/MENTAL HEALTH CLINIC | Facility: CLINIC | Age: 49
End: 2024-01-05

## 2024-01-05 ENCOUNTER — HOSPITAL ENCOUNTER (OUTPATIENT)
Dept: INFUSION CENTER | Facility: HOSPITAL | Age: 49
End: 2024-01-05
Attending: INTERNAL MEDICINE
Payer: COMMERCIAL

## 2024-01-05 VITALS
OXYGEN SATURATION: 98 % | TEMPERATURE: 97.9 F | SYSTOLIC BLOOD PRESSURE: 107 MMHG | RESPIRATION RATE: 17 BRPM | HEART RATE: 113 BPM | DIASTOLIC BLOOD PRESSURE: 76 MMHG

## 2024-01-05 DIAGNOSIS — E44.1 MILD PROTEIN-CALORIE MALNUTRITION (HCC): ICD-10-CM

## 2024-01-05 DIAGNOSIS — D50.0 IRON DEFICIENCY ANEMIA DUE TO CHRONIC BLOOD LOSS: ICD-10-CM

## 2024-01-05 DIAGNOSIS — R63.0 ANOREXIA: ICD-10-CM

## 2024-01-05 DIAGNOSIS — R11.10 VOMITING AND DIARRHEA: Primary | ICD-10-CM

## 2024-01-05 DIAGNOSIS — E61.1 IRON DEFICIENCY: ICD-10-CM

## 2024-01-05 DIAGNOSIS — R19.7 VOMITING AND DIARRHEA: Primary | ICD-10-CM

## 2024-01-05 PROCEDURE — 96367 TX/PROPH/DG ADDL SEQ IV INF: CPT

## 2024-01-05 PROCEDURE — 96365 THER/PROPH/DIAG IV INF INIT: CPT

## 2024-01-05 PROCEDURE — 96402 CHEMO HORMON ANTINEOPL SQ/IM: CPT

## 2024-01-05 PROCEDURE — 96375 TX/PRO/DX INJ NEW DRUG ADDON: CPT

## 2024-01-05 RX ORDER — METHYLPREDNISOLONE SODIUM SUCCINATE 40 MG/ML
40 INJECTION, POWDER, LYOPHILIZED, FOR SOLUTION INTRAMUSCULAR; INTRAVENOUS ONCE
Start: 2024-01-05 | End: 2024-01-06

## 2024-01-05 RX ORDER — METHYLPREDNISOLONE SODIUM SUCCINATE 40 MG/ML
40 INJECTION, POWDER, LYOPHILIZED, FOR SOLUTION INTRAMUSCULAR; INTRAVENOUS ONCE
Status: COMPLETED | OUTPATIENT
Start: 2024-01-05 | End: 2024-01-05

## 2024-01-05 RX ADMIN — DIPHENHYDRAMINE HYDROCHLORIDE 50 MG: 50 INJECTION, SOLUTION INTRAMUSCULAR; INTRAVENOUS at 12:31

## 2024-01-05 RX ADMIN — METHYLPREDNISOLONE SODIUM SUCCINATE 40 MG: 40 INJECTION, POWDER, FOR SOLUTION INTRAMUSCULAR; INTRAVENOUS at 13:26

## 2024-01-05 RX ADMIN — METHOTREXATE 25 MG: 25 SOLUTION INTRA-ARTERIAL; INTRAMUSCULAR; INTRATHECAL; INTRAVENOUS at 13:51

## 2024-01-05 RX ADMIN — SODIUM CHLORIDE 1000 ML: 0.9 INJECTION, SOLUTION INTRAVENOUS at 11:47

## 2024-01-05 RX ADMIN — FAMOTIDINE 40 MG: 10 INJECTION INTRAVENOUS at 11:57

## 2024-01-05 NOTE — PROGRESS NOTES
Celine Roa  tolerated treatment well with no complications.      Celine Roa is aware of future appt on 1/8/24 at 1.     AVS printed and given to Celine Roa:   No (Declined by Celine Rao) x

## 2024-01-08 ENCOUNTER — HOSPITAL ENCOUNTER (OUTPATIENT)
Dept: INFUSION CENTER | Facility: HOSPITAL | Age: 49
Discharge: HOME | End: 2024-01-08
Attending: INTERNAL MEDICINE

## 2024-01-08 ENCOUNTER — TELEPHONE (OUTPATIENT)
Dept: PSYCHIATRY | Facility: CLINIC | Age: 49
End: 2024-01-08

## 2024-01-08 NOTE — PROGRESS NOTES
Phone call received from pt stating that she is not feeling well and is cancelling today's appointment. Will keep her appointment on Thursday.

## 2024-01-08 NOTE — PROGRESS NOTES
Assessment/Plan:    No problem-specific Assessment & Plan notes found for this encounter.       Diagnoses and all orders for this visit:    Acute maxillary sinusitis, recurrence not specified  Comments:  keflex  Orders:  -     cephalexin (KEFLEX) 500 mg capsule; Take 1 capsule (500 mg total) by mouth every 8 (eight) hours for 7 days    Chronic idiopathic urticaria  -     omalizumab (XOLAIR) 150 mg; Inject 2.4 mL (300 mg total) under the skin every 21 days    Other orders  -     Discontinue: ARIPiprazole (ABILIFY DISCMELT) 10 mg dispersible tablet; 10 mg (Patient not taking: Reported on 11/27/2023)  -     Ascorbic Acid (vitamin C) 1000 MG tablet; Take 1,000 mg by mouth 2 (two) times a day  -     Riboflavin (Vitamin B-2) 100 MG TABS; Take 100 mg by mouth 2 (two) times a day  -     magnesium gluconate (MAGONATE) 500 mg tablet; Take 500 mg by mouth 2 (two) times a day  -     cholecalciferol (VITAMIN D3) 1,000 units tablet; Take 1,000 Units by mouth 2 (two) times a day          Subjective:      Patient ID: Celine Roa is a 48 y.o. female.    HPI Patient presents with approx. One week URI sxs. , ear fullness and sinus pain. No F/S/C. No throat pain but post nasal drip.    The following portions of the patient's history were reviewed and updated as appropriate: allergies, current medications, past family history, past medical history, past social history, past surgical history, and problem list.    Review of Systems   Constitutional:  Negative for activity change, appetite change and fever.   HENT:  Positive for ear pain, postnasal drip, rhinorrhea and sinus pressure. Negative for sore throat.    Respiratory:  Positive for cough. Negative for shortness of breath and wheezing.    Cardiovascular:  Negative for chest pain.   Gastrointestinal:  Negative for abdominal pain, nausea and vomiting.   Genitourinary:  Negative for dysuria.   Musculoskeletal:  Negative for myalgias.   Skin:         Chronic urticaria   Neurological:   "Negative for syncope.   Psychiatric/Behavioral:  Negative for decreased concentration.          Objective:      /68 (BP Location: Left arm, Patient Position: Sitting, Cuff Size: Standard)   Pulse 98   Temp 98.6 °F (37 °C)   Resp 18   Ht 5' 0.25\" (1.53 m)   Wt 71.7 kg (158 lb)   SpO2 98%   BMI 30.60 kg/m²          Physical Exam  Vitals reviewed.   Constitutional:       Appearance: Normal appearance.   HENT:      Head: Normocephalic and atraumatic.      Right Ear: Tympanic membrane, ear canal and external ear normal.      Left Ear: Tympanic membrane, ear canal and external ear normal.      Nose: Congestion present.      Comments: Sinus tenderness to palpation     Mouth/Throat:      Pharynx: No oropharyngeal exudate or posterior oropharyngeal erythema.   Eyes:      General: No scleral icterus.     Conjunctiva/sclera: Conjunctivae normal.   Cardiovascular:      Rate and Rhythm: Normal rate and regular rhythm.      Pulses: Normal pulses.      Heart sounds: No murmur heard.     No gallop.   Pulmonary:      Effort: Pulmonary effort is normal.      Breath sounds: Normal breath sounds.   Musculoskeletal:      Cervical back: Normal range of motion.   Lymphadenopathy:      Cervical: No cervical adenopathy.   Skin:     General: Skin is warm and dry.   Neurological:      General: No focal deficit present.      Mental Status: She is alert and oriented to person, place, and time.   Psychiatric:         Mood and Affect: Mood normal.         Behavior: Behavior normal.           "

## 2024-01-09 NOTE — TELEPHONE ENCOUNTER
The patient contacted the office in regards to her no showed appointment on 1/5/24 at 4:00 pm. The patient apologized profusely for missing her appointment. She expressed that on the day of the appointment, she did not feel well. She went to the treatment center to get seen; afterward, she went home and fell asleep, completely forgetting about her appointment. The patient confirmed that she will be attending her next appointment on 1/26/24 at 5:00 p.m.

## 2024-01-12 ENCOUNTER — OFFICE VISIT (OUTPATIENT)
Dept: FAMILY MEDICINE CLINIC | Facility: HOME HEALTHCARE | Age: 49
End: 2024-01-12
Payer: MEDICARE

## 2024-01-12 DIAGNOSIS — K52.1 DIARRHEA DUE TO DRUG: Primary | ICD-10-CM

## 2024-01-12 PROCEDURE — 99213 OFFICE O/P EST LOW 20 MIN: CPT

## 2024-01-12 NOTE — ASSESSMENT & PLAN NOTE
C/o of diarrhea symptoms for multiple days  Recently started on new medication; Skyrizi for Crohn's disease.  Received 3 doses, most recent yesterday (1/11/24)  Next dose scheduled for 1/15/2024.  Patient has allergy to corn  Ingredients include corn syrup  Patient having abdominal pain, GI discomfort, diarrhea symptoms  Medication prescribed by Dr. Levine, GI  We will reach out to provider to discuss alternative therapies.  Plan:  Stop offending agent; Skyrizi  Update patient with further instruction after discussing case with Dr. Levine  Increase fluid intake to prevent dehydration

## 2024-01-12 NOTE — PROGRESS NOTES
Assessment/Plan:    Diarrhea due to drug  C/o of diarrhea symptoms for multiple days  Recently started on new medication; Skyrizi for Crohn's disease.  Received 3 doses, most recent yesterday (1/11/24)  Next dose scheduled for 1/15/2024.  Patient has allergy to corn  Ingredients include corn syrup  Patient having abdominal pain, GI discomfort, diarrhea symptoms  Medication prescribed by Dr. Levine, GI  We will reach out to provider to discuss alternative therapies.  Plan:  Stop offending agent; Skyrizi  Update patient with further instruction after discussing case with Dr. Levine  Increase fluid intake to prevent dehydration       Diagnoses and all orders for this visit:    Diarrhea due to drug          Subjective:      Patient ID: Celine Roa is a 48 y.o. female.    Patient is a 48 y.o. female with hereditary alpha tryptesemia, Hashimoto's, inflammatory bowel disease, Crohns. Patient complaining of diarrhea symptoms, on and off,  for multiple days.   Patient is followed by GI [Dr. Levine] and recently placed on Skyrizi infusions.  Patient has had 3 infusions, most recently yesterday.  Patient has allergy to corn and notes medication ingredients include corn syrup.  She is experiencing abdominal pain, GI discomfort, diarrhea.   With symptoms coinciding with the initiation of a new medication, the diarrhea is most likely to be caused by the corn syrup. Patient has complicated GI history.  She is okay with holding medication for now, and allowing us to discuss case with Dr. Levine to discuss next steps.        The following portions of the patient's history were reviewed and updated as appropriate: allergies, current medications, past family history, past medical history, past social history, past surgical history, and problem list.    Review of Systems   Constitutional:  Negative for chills and fever.   HENT:  Negative for ear pain and sore throat.    Eyes:  Negative for pain and visual disturbance.    Respiratory:  Negative for cough and shortness of breath.    Cardiovascular:  Negative for chest pain and palpitations.   Gastrointestinal:  Positive for abdominal pain and diarrhea. Negative for vomiting.   Genitourinary:  Negative for dysuria and hematuria.   Musculoskeletal:  Negative for arthralgias and back pain.   Skin:  Negative for color change and rash.   Neurological:  Negative for seizures and syncope.   All other systems reviewed and are negative.        Objective:       Physical Exam  Vitals and nursing note reviewed.   Constitutional:       Appearance: Normal appearance.   HENT:      Right Ear: External ear normal.      Left Ear: External ear normal.      Nose: No rhinorrhea.   Eyes:      General:         Right eye: No discharge.         Left eye: No discharge.      Conjunctiva/sclera: Conjunctivae normal.   Cardiovascular:      Rate and Rhythm: Normal rate and regular rhythm.      Heart sounds: Normal heart sounds.   Pulmonary:      Effort: Pulmonary effort is normal. No respiratory distress.      Breath sounds: Normal breath sounds.   Abdominal:      General: Bowel sounds are normal. There is no distension.      Palpations: Abdomen is soft. There is no mass.      Tenderness: There is abdominal tenderness (mid-upper). There is no guarding or rebound.   Musculoskeletal:      Right lower leg: No edema.      Left lower leg: No edema.   Skin:     Coloration: Skin is not jaundiced.      Findings: No erythema or rash.   Neurological:      Mental Status: She is alert.   Psychiatric:         Mood and Affect: Mood normal.         Behavior: Behavior normal.

## 2024-01-15 ENCOUNTER — HOSPITAL ENCOUNTER (OUTPATIENT)
Dept: INFUSION CENTER | Facility: HOSPITAL | Age: 49
Discharge: HOME | End: 2024-01-15
Attending: INTERNAL MEDICINE
Payer: COMMERCIAL

## 2024-01-15 ENCOUNTER — APPOINTMENT (OUTPATIENT)
Dept: LAB | Facility: HOSPITAL | Age: 49
End: 2024-01-15
Payer: COMMERCIAL

## 2024-01-15 VITALS
DIASTOLIC BLOOD PRESSURE: 78 MMHG | HEART RATE: 109 BPM | RESPIRATION RATE: 16 BRPM | OXYGEN SATURATION: 99 % | SYSTOLIC BLOOD PRESSURE: 116 MMHG | TEMPERATURE: 97.6 F

## 2024-01-15 DIAGNOSIS — R63.0 ANOREXIA: ICD-10-CM

## 2024-01-15 DIAGNOSIS — R11.10 VOMITING AND DIARRHEA: Primary | ICD-10-CM

## 2024-01-15 DIAGNOSIS — E61.1 IRON DEFICIENCY: ICD-10-CM

## 2024-01-15 DIAGNOSIS — D50.0 IRON DEFICIENCY ANEMIA DUE TO CHRONIC BLOOD LOSS: ICD-10-CM

## 2024-01-15 DIAGNOSIS — E83.10 IRON DISORDER: ICD-10-CM

## 2024-01-15 DIAGNOSIS — R19.7 DIARRHEA, UNSPECIFIED TYPE: ICD-10-CM

## 2024-01-15 DIAGNOSIS — R19.7 VOMITING AND DIARRHEA: Primary | ICD-10-CM

## 2024-01-15 DIAGNOSIS — D89.40 MAST CELL ACTIVATION (HCC): ICD-10-CM

## 2024-01-15 DIAGNOSIS — B99.8 IMMUNOSUPPRESSION-RELATED INFECTIOUS DISEASE (HCC): ICD-10-CM

## 2024-01-15 DIAGNOSIS — K50.119 CROHN'S DISEASE OF LARGE INTESTINE WITH COMPLICATION (HCC): ICD-10-CM

## 2024-01-15 DIAGNOSIS — D84.9 IMMUNOSUPPRESSION-RELATED INFECTIOUS DISEASE (HCC): ICD-10-CM

## 2024-01-15 DIAGNOSIS — E54 VITAMIN C DEFICIENCY: ICD-10-CM

## 2024-01-15 DIAGNOSIS — E44.1 MILD PROTEIN-CALORIE MALNUTRITION (HCC): ICD-10-CM

## 2024-01-15 LAB
ALBUMIN SERPL BCP-MCNC: 3.8 G/DL (ref 3.5–5)
ALP SERPL-CCNC: 71 U/L (ref 34–104)
ALT SERPL W P-5'-P-CCNC: 39 U/L (ref 7–52)
ANION GAP SERPL CALCULATED.3IONS-SCNC: 9 MMOL/L
AST SERPL W P-5'-P-CCNC: 21 U/L (ref 13–39)
BASOPHILS # BLD AUTO: 0.14 THOUSANDS/ÂΜL (ref 0–0.1)
BASOPHILS NFR BLD AUTO: 1 % (ref 0–1)
BILIRUB SERPL-MCNC: 0.21 MG/DL (ref 0.2–1)
BUN SERPL-MCNC: 18 MG/DL (ref 5–25)
CALCIUM SERPL-MCNC: 8.7 MG/DL (ref 8.4–10.2)
CHLORIDE SERPL-SCNC: 108 MMOL/L (ref 96–108)
CO2 SERPL-SCNC: 22 MMOL/L (ref 21–32)
CREAT SERPL-MCNC: 0.51 MG/DL (ref 0.6–1.3)
CRP SERPL QL: 3.4 MG/L
EOSINOPHIL # BLD AUTO: 0.37 THOUSAND/ÂΜL (ref 0–0.61)
EOSINOPHIL NFR BLD AUTO: 3 % (ref 0–6)
ERYTHROCYTE [DISTWIDTH] IN BLOOD BY AUTOMATED COUNT: 15 % (ref 11.6–15.1)
GFR SERPL CREATININE-BSD FRML MDRD: 112 ML/MIN/1.73SQ M
GLUCOSE SERPL-MCNC: 122 MG/DL (ref 65–140)
HCT VFR BLD AUTO: 44.5 % (ref 34.8–46.1)
HGB BLD-MCNC: 14.7 G/DL (ref 11.5–15.4)
IMM GRANULOCYTES # BLD AUTO: 0.1 THOUSAND/UL (ref 0–0.2)
IMM GRANULOCYTES NFR BLD AUTO: 1 % (ref 0–2)
IRON SERPL-MCNC: 59 UG/DL (ref 50–212)
LYMPHOCYTES # BLD AUTO: 2.17 THOUSANDS/ÂΜL (ref 0.6–4.47)
LYMPHOCYTES NFR BLD AUTO: 15 % (ref 14–44)
MCH RBC QN AUTO: 32 PG (ref 26.8–34.3)
MCHC RBC AUTO-ENTMCNC: 33 G/DL (ref 31.4–37.4)
MCV RBC AUTO: 97 FL (ref 82–98)
MONOCYTES # BLD AUTO: 1.05 THOUSAND/ÂΜL (ref 0.17–1.22)
MONOCYTES NFR BLD AUTO: 7 % (ref 4–12)
NEUTROPHILS # BLD AUTO: 11.08 THOUSANDS/ÂΜL (ref 1.85–7.62)
NEUTS SEG NFR BLD AUTO: 73 % (ref 43–75)
NRBC BLD AUTO-RTO: 0 /100 WBCS
PLATELET # BLD AUTO: 448 THOUSANDS/UL (ref 149–390)
PMV BLD AUTO: 8.4 FL (ref 8.9–12.7)
POTASSIUM SERPL-SCNC: 3.6 MMOL/L (ref 3.5–5.3)
PROT SERPL-MCNC: 6.3 G/DL (ref 6.4–8.4)
RBC # BLD AUTO: 4.6 MILLION/UL (ref 3.81–5.12)
SODIUM SERPL-SCNC: 139 MMOL/L (ref 135–147)
WBC # BLD AUTO: 14.91 THOUSAND/UL (ref 4.31–10.16)

## 2024-01-15 PROCEDURE — 96365 THER/PROPH/DIAG IV INF INIT: CPT

## 2024-01-15 PROCEDURE — 36415 COLL VENOUS BLD VENIPUNCTURE: CPT

## 2024-01-15 PROCEDURE — 83540 ASSAY OF IRON: CPT

## 2024-01-15 PROCEDURE — 86140 C-REACTIVE PROTEIN: CPT

## 2024-01-15 PROCEDURE — 85025 COMPLETE CBC W/AUTO DIFF WBC: CPT

## 2024-01-15 PROCEDURE — 82180 ASSAY OF ASCORBIC ACID: CPT

## 2024-01-15 PROCEDURE — 80053 COMPREHEN METABOLIC PANEL: CPT

## 2024-01-15 PROCEDURE — 96361 HYDRATE IV INFUSION ADD-ON: CPT

## 2024-01-15 PROCEDURE — 96375 TX/PRO/DX INJ NEW DRUG ADDON: CPT

## 2024-01-15 RX ORDER — METHYLPREDNISOLONE SODIUM SUCCINATE 40 MG/ML
40 INJECTION, POWDER, LYOPHILIZED, FOR SOLUTION INTRAMUSCULAR; INTRAVENOUS ONCE
Status: CANCELLED
Start: 2024-01-18 | End: 2024-01-16

## 2024-01-15 RX ORDER — METHYLPREDNISOLONE SODIUM SUCCINATE 40 MG/ML
40 INJECTION, POWDER, LYOPHILIZED, FOR SOLUTION INTRAMUSCULAR; INTRAVENOUS ONCE
Status: COMPLETED | OUTPATIENT
Start: 2024-01-15 | End: 2024-01-15

## 2024-01-15 RX ADMIN — DIPHENHYDRAMINE HYDROCHLORIDE 50 MG: 50 INJECTION, SOLUTION INTRAMUSCULAR; INTRAVENOUS at 14:35

## 2024-01-15 RX ADMIN — FAMOTIDINE 40 MG: 10 INJECTION INTRAVENOUS at 15:16

## 2024-01-15 RX ADMIN — SODIUM CHLORIDE 1000 ML: 0.9 INJECTION, SOLUTION INTRAVENOUS at 13:45

## 2024-01-15 RX ADMIN — METHYLPREDNISOLONE SODIUM SUCCINATE 40 MG: 40 INJECTION, POWDER, FOR SOLUTION INTRAMUSCULAR; INTRAVENOUS at 15:47

## 2024-01-15 NOTE — PROGRESS NOTES
Celine Roa  tolerated treatment well with no complications.      Celine Roa is aware of future appt on 1/18/24 at 1230.     AVS declined.

## 2024-01-17 ENCOUNTER — TELEPHONE (OUTPATIENT)
Dept: PSYCHIATRY | Facility: CLINIC | Age: 49
End: 2024-01-17

## 2024-01-17 ENCOUNTER — TELEMEDICINE (OUTPATIENT)
Dept: GASTROENTEROLOGY | Facility: CLINIC | Age: 49
End: 2024-01-17
Payer: COMMERCIAL

## 2024-01-17 DIAGNOSIS — T38.0X5A IMMUNOSUPPRESSION DUE TO CHRONIC STEROID USE: ICD-10-CM

## 2024-01-17 DIAGNOSIS — D89.40 MAST CELL ACTIVATION SYNDROME (HCC): ICD-10-CM

## 2024-01-17 DIAGNOSIS — K50.119 CROHN'S DISEASE OF COLON WITH COMPLICATION (HCC): Primary | ICD-10-CM

## 2024-01-17 DIAGNOSIS — K92.1 BLACK STOOL: ICD-10-CM

## 2024-01-17 DIAGNOSIS — R79.89 LOW VITAMIN D LEVEL: ICD-10-CM

## 2024-01-17 DIAGNOSIS — Z79.52 IMMUNOSUPPRESSION DUE TO CHRONIC STEROID USE: ICD-10-CM

## 2024-01-17 DIAGNOSIS — D84.9 IMMUNOSUPPRESSED STATUS (HCC): ICD-10-CM

## 2024-01-17 DIAGNOSIS — T50.905A DRUG-INDUCED LUPUS ERYTHEMATOSUS: ICD-10-CM

## 2024-01-17 DIAGNOSIS — L93.2 DRUG-INDUCED LUPUS ERYTHEMATOSUS: ICD-10-CM

## 2024-01-17 DIAGNOSIS — R19.7 DIARRHEA, UNSPECIFIED TYPE: ICD-10-CM

## 2024-01-17 DIAGNOSIS — E61.1 IRON DEFICIENCY: ICD-10-CM

## 2024-01-17 DIAGNOSIS — D84.821 IMMUNOSUPPRESSION DUE TO CHRONIC STEROID USE: ICD-10-CM

## 2024-01-17 DIAGNOSIS — E44.1 MILD PROTEIN-CALORIE MALNUTRITION (HCC): ICD-10-CM

## 2024-01-17 DIAGNOSIS — F41.1 GENERALIZED ANXIETY DISORDER: ICD-10-CM

## 2024-01-17 PROCEDURE — 99215 OFFICE O/P EST HI 40 MIN: CPT | Performed by: INTERNAL MEDICINE

## 2024-01-17 RX ORDER — PREDNISONE 20 MG/1
TABLET ORAL
Qty: 100 TABLET | Refills: 0 | Status: SHIPPED | OUTPATIENT
Start: 2024-01-17

## 2024-01-17 RX ORDER — MIRTAZAPINE 15 MG/1
15 TABLET, FILM COATED ORAL
Qty: 30 TABLET | Refills: 0 | Status: SHIPPED | OUTPATIENT
Start: 2024-01-17 | End: 2024-01-23 | Stop reason: SDUPTHER

## 2024-01-17 NOTE — TELEPHONE ENCOUNTER
This is a Dr. Lewis patient. I scheduled an appointment for her but she is having problems with medication.    Hi, my name is Aleah JAIN YOB: 1975. This is the second time I'm calling regarding my Remeron prescription and the higher dose needed, the 15 milligrams versus the 7 1/2 milligrams. I was supposed to see Doctor Matthew's back in December around the 20th or 21st. And I don't know, somehow that appointment got moved to February and she had wanted to see me after two or three weeks with the lower doses to see if it was an improvement or not. And now I'm struggling to get through. So them if you could please call in the prescription for the 15 milligrams of Remeron to [facility]. Again, this is Aleah RoaSUSY, birth date 1975 Thank you. I'm not scheduled to see her now until February.

## 2024-01-17 NOTE — PROGRESS NOTES
Virtual Regular Visit    Verification of patient location:    Patient is located at Home in the following state in which I hold an active license [place]      Assessment/Plan:    Celine Roa is a 48 y.o. female with hereditary alpha tryptase EMEA, Hashimoto's, inflammatory bowel disease thought to most likely be Crohn's disease previously on anti-TNF therapy but complicated by drug-induced lupus for which she was switched to Stelara with improvement initially but for which she stopped it as she felt like she was not responding well, subsequently switched to Skyrizi and methotrexate, prior diagnosis of mast cell activation syndrome, now presenting for follow-up, last seen December 2023.  The patient felt that the Skyrizi was worsening her symptoms since last visit because of the dextrose contained within the formulation. She is not feeling well and feels like her flare has been ongoing. Also is concerned her RUQ pain is secondary to sphincter of oddi dysfunction. Also concerned about whole gut dysmotility.     Endoscopy and colonoscopy October 2021 grossly normal and biopsies with patchy chronic active left-sided colitis and some quiescent colitis, as well as some erythema and erosions in the stomach.  Endoscopy and colonoscopy May 2023 with erythema and erosion in the stomach as well as mild patchy erythema and granular mucosa in the left side of the colon and biopsies with inactive gastritis as well as chronic active colitis along the left colon and some active inflammation in the rectum.  Endoscopy from September 2023 normal in appearance and flexible sigmoidoscopy with mild abnormal mucosa in the sigmoid and rectosigmoid with biopsies without increased mast cells but evidence of relatively benign colonic biopsies aside from some cryptitis and acute inflammation in the sigmoid and crypt distortion in the rectum. Last ERCP was in 2017    MRI/MRCP with no interval progression of mild central intrahepatic and  extrahepatic bile duct prominence.  Prior abdominal ultrasound with cholecystectomy.  Prior CT abdomen pelvis with pleural-based lesion as well as trace pneumobilia and slight extrahepatic biliary ductal dilatation.  Most recent MRI/MRCP from July with dilated common bile duct and distal periampullary portion not well-visualized suggesting distal biliary stricture, as well as evidence of hepatic steatosis.    Most recent EKG with QTc of 437 and mild sinus tachycardia.  Most recent CMP with total protein slightly low at 6.3 but overall normal electrolytes, creatinine, liver test.  Most recent iron normal and most recent ferritin slightly elevated at 359.  CBC with white count elevated at 14 and platelets elevated at 448.  Normal hemoglobin and MCV.  Hemoglobin A1c slightly elevated at 5.7 consistent with prediabetes.  CRP mildly elevated at 3.4.  Calprotectin elevated at 1370.    Notably, LFTs are normal aside from low total protein. We discussed sphincter of oddi.         Problem List Items Addressed This Visit       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HCC)    Relevant Medications    predniSONE 20 mg tablet    Other Relevant Orders    CBC and differential    Comprehensive metabolic panel    C-reactive protein    Immunosuppressed status (HCC)    Relevant Orders    CBC and differential    Comprehensive metabolic panel    C-reactive protein    Mild protein-calorie malnutrition (HCC)    Relevant Orders    CBC and differential    Comprehensive metabolic panel    C-reactive protein    Crohn's colitis (HCC) - Primary    Relevant Medications    predniSONE 20 mg tablet    Other Relevant Orders    CBC and differential    Comprehensive metabolic panel    C-reactive protein    Immunosuppression due to chronic steroid use     Relevant Medications    predniSONE 20 mg tablet    Other Relevant Orders    CBC and differential    Comprehensive metabolic panel    C-reactive protein    Iron deficiency    Relevant Orders    CBC and  differential    Comprehensive metabolic panel    C-reactive protein    Iron Panel (Includes Ferritin, Iron Sat%, Iron, and TIBC)     Other Visit Diagnoses       Diarrhea, unspecified type        Relevant Orders    CBC and differential    Comprehensive metabolic panel    C-reactive protein    Calprotectin,Fecal    Ambulatory Referral to Gastroenterology    Drug-induced lupus erythematosus        Relevant Medications    predniSONE 20 mg tablet    Black stool        Low vitamin D level              Continue Skyrizi  Trial of prednisone to get flare under control  Consideration of Rinvoq in the future  Methotrexate and folic acid for now but would have to stop this should the patient switch to Rinvoq  Next blood work in 3 to 4 months and ordered today  Next quant gold and hepatitis panel December 2024    Next calprotectin in 3 months  Recommend low fat and high protein stools.   Add over the counter digestive enzymes    She is interested in SOD manometry  She is interested in whole gut motility and recommended she make an appointment at [place]  Next colonoscopy 2024  Next enterography timing to be determined    Continue Pepcid  Can restart PPI if methotrexate discontinued  She is receiving intermittent fluid hydration as per recommendation of her immunologist from [facility] given her prior diagnosis of mast cell activation  She requested additional IV supportive medications as she feels better on those medications  Iron infusions    Follow-up with multiple specialists given concluding allergist/immunologist, pulmonologist, hematologist    Ongoing IBD Healthcare maintenance           Reason for visit is No chief complaint on file.       Encounter provider Yehuda Levine MD    Provider located at [facility] [address]  [address]  [place] [id number] [phone]      Recent Visits  No visits were found meeting  these conditions.  Showing recent visits within past 7 days and meeting all other requirements  Today's Visits  Date Type Provider Dept   01/17/24 Telemedicine Yehuda Levine MD [facility]   Showing today's visits and meeting all other requirements  Future Appointments  No visits were found meeting these conditions.  Showing future appointments within next 150 days and meeting all other requirements       The patient was identified by name and date of birth. Celine Roa was informed that this is a telemedicine visit and that the visit is being conducted through Telephone.  My office door was closed. No one else was in the room.  She acknowledged consent and understanding of privacy and security of the video platform. The patient has agreed to participate and understands they can discontinue the visit at any time.    Patient is aware this is a billable service.     It was my intent to perform this visit via video technology but the patient was not able to do a video connection so the visit was completed via audio telephone only.      Subjective  Celine Roa is a 48 y.o. female with Crohn's.    She is not feeling well. She is trying to mentally keep everything together. She is frustrated with her stomach. Either diarrhea 5-10 times a day and not formed. Otherwise no stool. Yesterday her stool was dark black with yellow-greasy substance with it. + migraine today, almost daily. She saw her PCP. Recommended she consider a course of prednisone. Also waking up in the morning with headache. She stopped the fiorocet. She takes Excedrin and then has diarrhea but the headache improves. She is concerned regarding Sphincter of Oddi. She is craving food but not necessarily hungry. She feels like she is not absorbing nutrition. No red blood in the stools. RUQ abdominal pain. She is very bloated.     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 pt denies, documented in cardio note    Pancreatitis     sphinger of     Psychiatric disorder     RA (rheumatoid arthritis) (HCC)     Seizures (HCC)     Ulcerative colitis (HCC)     Visual impairment        Past Surgical History:   Procedure Laterality Date    ABDOMINAL SURGERY  2017    APPENDECTOMY      CHOLECYSTECTOMY      COLONOSCOPY  2019    CYSTOSCOPY N/A 6/8/2023    Procedure: CYSTOSCOPY, mini TURBT with fulgeration;  Surgeon: Tee Bruno MD;  Location: [facility];  Service: Urology    EGD AND COLONOSCOPY N/A 09/12/2017    Procedure: EGD AND COLONOSCOPY;  Surgeon: Tommy Oliver MD;  [facility]: [facility];  Service: Gastroenterology    ERCP N/A 09/15/2017    Procedure: ENDOSCOPIC RETROGRADE CHOLANGIOPANCREATOGRAPHY (ERCP);  Surgeon: Dre Soto MD;  Location: [facility];  Service: Gastroenterology    HYSTERECTOMY      KNEE SURGERY Right     LAPAROSCOPIC ENDOMETRIOSIS FULGURATION      ORIF ANKLE FRACTURE Left     SC ARTHRS KNE SURG W/MENISCECTOMY MED/LAT W/SHVG Left 05/12/2017    Procedure: POSSIBLE MEDIAL MENISECTOMY;  Surgeon: Kristian Avila MD;  Location: [facility];  Service: Orthopedics    SC ARTHRS KNEE DEBRIDEMENT/SHAVING ARTCLR CRTLG Left 05/12/2017    Procedure: KNEE ARTHROSCOPY EVALUATION, CHONDROPLASTY;  Surgeon: Kristian Avila MD;  Location: [facility];  " Service: Orthopedics    MO LAPS ABD PRTM&OMENTUM DX W/WO SPEC BR/WA SPX N/A 12/12/2017    Procedure: LAPAROSCOPY DIAGNOSTIC  WITH LYSIS OF ADHESIONS;  Surgeon: Olaf John DO;  [facility]: [facility];  Service: General    UPPER GASTROINTESTINAL ENDOSCOPY  2019       Current Outpatient Medications   Medication Sig Dispense Refill    predniSONE 20 mg tablet Take 40mg per day for 1 week, then 30mg per day for 1 week, then 20mg per day for 1 week and then 10mg per day for 1 week. 100 tablet 0    Ascorbic Acid (vitamin C) 1000 MG tablet Take 1,000 mg by mouth 2 (two) times a day      B-D 3CC LUER-SHAMEKA SYR 25GX1\" 25G X 1\" 3 ML MISC       butalbital-acetaminophen-caffeine (FIORICET,ESGIC) -40 mg per tablet Take 1 tablet by mouth every 8 (eight) hours as needed for migraine 20 tablet 1    cetirizine (ZyrTEC) 10 mg tablet Take 20 mg by mouth daily      cholecalciferol (VITAMIN D3) 1,000 units tablet Take 1,000 Units by mouth 2 (two) times a day      Cholecalciferol 10 MCG /0.025ML LIQD Take 1,000 Units by mouth 2 (two) times a day 750 mL 1    cyanocobalamin (VITAMIN B-12) 100 mcg tablet Take by mouth daily      desvenlafaxine succinate 25 MG TB24 Take 1 tablet (25 mg total) by mouth daily 30 tablet 1    diclofenac (VOLTAREN) 75 mg EC tablet       EpiPen 2-Malachi 0.3 MG/0.3ML SOAJ INJECT 1 PEN INTO THE MUSCLE AS NEEDED FOR ANAPHALAXIS      famotidine (PEPCID) 40 MG tablet TAKE ONE TABLET BY MOUTH ONCE DAILY 90 tablet 1    folic acid (KP Folic Acid) 1 mg tablet Take 1 tablet (1 mg total) by mouth daily 30 tablet 3    hydrOXYzine HCL (ATARAX) 25 mg tablet       Lactobacillus (PROBIOTIC ACIDOPHILUS PO) Take by mouth      Lactobacillus-Inulin ([facility]) CAPS Take 200 mg by mouth daily      levalbuterol (XOPENEX HFA) 45 mcg/act inhaler Inhale 1-2 puffs every 4 (four) hours as needed for shortness of breath 45 g 2    levothyroxine 100 mcg tablet Take 100 mcg by mouth every other day      liothyronine " "(CYTOMEL) 5 mcg tablet Take 1 tablet (5 mcg total) by mouth daily Do not start before June 14, 2023. 30 tablet 0    magnesium gluconate (MAGONATE) 500 mg tablet Take 500 mg by mouth 2 (two) times a day      Melatonin 3 MG SUBL       melatonin 3 mg Take 1 tablet (3 mg total) by mouth daily at bedtime 30 tablet 0    methotrexate 50 MG/2ML injection Inject 1 mL (25 mg total) under the skin once a week 4 mL 10    methotrexate 50 MG/2ML injection Inject 1 mL (25 mg total) under the skin once a week 4 mL 10    mirtazapine (REMERON) 15 mg tablet Take 1 tablet (15 mg total) by mouth daily at bedtime 30 tablet 0    montelukast (SINGULAIR) 10 mg tablet Take 1 tablet (10 mg total) by mouth daily at bedtime  0    NEEDLE, DISP, 27 G (B-D DISP NEEDLE 15TT7-0/4\") 27G X 1-1/4\" MISC Use once a week 100 each 0    NON FORMULARY immunoglobin sq 6g 30 ml once a week      nystatin (MYCOSTATIN) 500,000 units/5 mL suspension Swish and spit 5 mL (500,000 Units total) 4 (four) times a day (Patient not taking: Reported on 9/13/2023) 473 mL 0    omalizumab (XOLAIR) 150 mg Inject 2.4 mL (300 mg total) under the skin every 21 days 1 each 16    pantoprazole (PROTONIX) 40 mg tablet TAKE ONE TABLET BY MOUTH TWICE A DAY (Patient not taking: Reported on 11/27/2023) 60 tablet 5    Riboflavin (VITAMIN B-2 PO) Take by mouth      Riboflavin (Vitamin B-2) 100 MG TABS Take 100 mg by mouth 2 (two) times a day      risankizumab-rzaa (Skyrizi) 360 MG/2.4ML SOCT Take first on body injector (OBI) under skin on week 12 then every 8 weeks there after 2.4 mL 5    TechLite Lancets MISC       traMADol (Ultram) 50 mg tablet Take 1 tablet (50 mg total) by mouth every 12 (twelve) hours as needed for moderate pain or severe pain (Patient not taking: Reported on 9/14/2023) 60 tablet 0     No current facility-administered medications for this visit.        Allergies   Allergen Reactions    Aimovig [Erenumab-Aooe] Anaphylaxis    Amitriptyline Anaphylaxis     According to " the patient she had nphylaxis    Aspergillus Allergy Skin Test Other (See Comments), Hives and Swelling    Azathioprine Other (See Comments) and Anaphylaxis     pancreatitis  According to patient she needed Epipen    Banana - Food Allergy Anaphylaxis, Itching, Swelling and Tongue Swelling    Buspar [Buspirone] Hives and Shortness Of Breath    Citric Acid-Polysorbate 80 Abdominal Pain, Anaphylaxis, Anxiety, Bradycardia, Diarrhea, Fatigue, GI Intolerance, Headache, Hives, Hyperactivity, Itching, Lip Swelling, Nausea Only, Palpitations, Rash, Shortness Of Breath, Tachycardia, Throat Swelling, Tongue Swelling and Vomiting    Corn Dextrin Anaphylaxis     Any corn based products    Eggs Or Egg-Derived Products - Food Allergy Anaphylaxis    Epinephrine Anaphylaxis    Erenumab Anaphylaxis     patient sent portal message stating she had anaphylaxis  patient sent portal message stating she had anaphylaxis      Gadolinium Abdominal Pain, Anaphylaxis, Anxiety, Confusion, Delirium, Dizziness, Eye Swelling, Fatigue, GI Intolerance, Headache, Hives, Irritability, Itching, Lip Swelling, Nausea Only, Palpitations, Photosensitivity, Rash, Seizures, Shortness Of Breath, Swelling, Syncope, Throat Swelling, Tongue Swelling, Tremor, Visual Disturbance and Vomiting    Gelatin - Food Allergy Anaphylaxis    Heparin Hives     Painful welts     Lactated Ringers Shortness Of Breath     Pt questions this allergy, pt has received LR multiple times without reaction    Lavender Oil Anaphylaxis     Other reaction(s): anaphalxis    Malto Dextrin [Dextrin] Anaphylaxis    Other Anaphylaxis     Gel caps, maltodextrose, dextrose,grapes, lavender     Penicillium Notatum Shortness Of Breath and Swelling    Red Dye - Food Allergy Anaphylaxis and Other (See Comments)     intolerance    Sumatriptan Anaphylaxis     Bradycardia, sob, back pressure, head pain,throat tightness  According to the patient she had anphylaxis    Ustekinumab Anaphylaxis    Contrast  "[Iodinated Contrast Media] Other (See Comments)     Pt states needs to be premedicated prior to injection    Corn Oil - Food Allergy - Food Allergy Hives    Humira [Adalimumab] Other (See Comments)     \"I develop drug induced lupus\"    Ibuprofen Hives and Throat Swelling    Polyethylene Glycol Diarrhea and Vomiting    Remicade [Infliximab] Other (See Comments)     \"I develop drug induced lupus\"    Soy Allergy - Food Allergy Other (See Comments)    Sulfa Antibiotics Hives and Other (See Comments)    Sulfate Hives    Sulfites - Food Allergy Hives    Sweet Corn Extract Skin Test - Food Allergy Hives and Itching    Chlorhexidine Itching    Freederm Adhesive Remover [New Skin] Rash    Histamine Hives, Rash and Other (See Comments)     Intolerance      Wound Dressings Rash       REVIEW OF SYSTEMS:  10 point ROS reviewed and negative, except as above        PHYSICAL EXAMINATION:  Unable to perform physical examination given the unavailability of a video application.         Visit Time  I have spent a total time of 42 minutes on 01/17/24 in caring for this patient including Diagnostic results, Prognosis, Risks and benefits of tx options, Instructions for management, Patient and family education, Importance of tx compliance, Impressions, Counseling / Coordination of care, Documenting in the medical record, Reviewing / ordering tests, medicine, procedures  , and Obtaining or reviewing history  .          "

## 2024-01-18 ENCOUNTER — HOSPITAL ENCOUNTER (OUTPATIENT)
Dept: INFUSION CENTER | Facility: HOSPITAL | Age: 49
Discharge: HOME | End: 2024-01-18
Attending: INTERNAL MEDICINE
Payer: COMMERCIAL

## 2024-01-18 VITALS
HEART RATE: 108 BPM | TEMPERATURE: 98.4 F | OXYGEN SATURATION: 96 % | RESPIRATION RATE: 17 BRPM | DIASTOLIC BLOOD PRESSURE: 74 MMHG | SYSTOLIC BLOOD PRESSURE: 115 MMHG

## 2024-01-18 DIAGNOSIS — R19.7 VOMITING AND DIARRHEA: Primary | ICD-10-CM

## 2024-01-18 DIAGNOSIS — R63.0 ANOREXIA: ICD-10-CM

## 2024-01-18 DIAGNOSIS — D50.0 IRON DEFICIENCY ANEMIA DUE TO CHRONIC BLOOD LOSS: ICD-10-CM

## 2024-01-18 DIAGNOSIS — E61.1 IRON DEFICIENCY: ICD-10-CM

## 2024-01-18 DIAGNOSIS — E44.1 MILD PROTEIN-CALORIE MALNUTRITION (HCC): ICD-10-CM

## 2024-01-18 DIAGNOSIS — R11.10 VOMITING AND DIARRHEA: Primary | ICD-10-CM

## 2024-01-18 RX ORDER — METHYLPREDNISOLONE SODIUM SUCCINATE 40 MG/ML
40 INJECTION, POWDER, LYOPHILIZED, FOR SOLUTION INTRAMUSCULAR; INTRAVENOUS ONCE
Status: COMPLETED | OUTPATIENT
Start: 2024-01-18 | End: 2024-01-18

## 2024-01-18 RX ORDER — METHYLPREDNISOLONE SODIUM SUCCINATE 40 MG/ML
40 INJECTION, POWDER, LYOPHILIZED, FOR SOLUTION INTRAMUSCULAR; INTRAVENOUS ONCE
Status: CANCELLED
Start: 2024-01-23 | End: 2024-01-19

## 2024-01-18 RX ORDER — METHOTREXATE 25 MG/ML
25 INJECTION INTRA-ARTERIAL; INTRAMUSCULAR; INTRATHECAL; INTRAVENOUS ONCE
Status: COMPLETED | OUTPATIENT
Start: 2024-01-18 | End: 2024-01-18

## 2024-01-18 RX ADMIN — METHYLPREDNISOLONE SODIUM SUCCINATE 40 MG: 40 INJECTION, POWDER, FOR SOLUTION INTRAMUSCULAR; INTRAVENOUS at 14:28

## 2024-01-18 RX ADMIN — FAMOTIDINE 40 MG: 10 INJECTION, SOLUTION INTRAVENOUS at 14:00

## 2024-01-18 RX ADMIN — METHOTREXATE 25 MG: 25 SOLUTION INTRA-ARTERIAL; INTRAMUSCULAR; INTRATHECAL; INTRAVENOUS at 15:26

## 2024-01-18 RX ADMIN — DIPHENHYDRAMINE HYDROCHLORIDE 50 MG: 50 INJECTION, SOLUTION INTRAMUSCULAR; INTRAVENOUS at 13:23

## 2024-01-18 RX ADMIN — SODIUM CHLORIDE 1000 ML: 0.9 INJECTION, SOLUTION INTRAVENOUS at 13:12

## 2024-01-18 NOTE — PROGRESS NOTES
Celine Roa  tolerated treatment well with no complications.      Celine Roa is aware of future appt on 1/23/24 at 1.     AVS printed and given to Celine Roa:  No (Declined by Celine Roa) x

## 2024-01-19 LAB — VIT C SERPL-MCNC: 0.4 MG/DL (ref 0.4–2)

## 2024-01-23 ENCOUNTER — OFFICE VISIT (OUTPATIENT)
Dept: PSYCHIATRY | Facility: CLINIC | Age: 49
End: 2024-01-23
Payer: COMMERCIAL

## 2024-01-23 ENCOUNTER — HOSPITAL ENCOUNTER (OUTPATIENT)
Dept: INFUSION CENTER | Facility: HOSPITAL | Age: 49
Discharge: HOME | End: 2024-01-23
Attending: INTERNAL MEDICINE
Payer: COMMERCIAL

## 2024-01-23 DIAGNOSIS — D50.0 IRON DEFICIENCY ANEMIA DUE TO CHRONIC BLOOD LOSS: ICD-10-CM

## 2024-01-23 DIAGNOSIS — F41.1 GENERALIZED ANXIETY DISORDER: ICD-10-CM

## 2024-01-23 DIAGNOSIS — R11.10 VOMITING AND DIARRHEA: Primary | ICD-10-CM

## 2024-01-23 DIAGNOSIS — F33.41 RECURRENT MAJOR DEPRESSIVE DISORDER, IN PARTIAL REMISSION (HCC): Primary | ICD-10-CM

## 2024-01-23 DIAGNOSIS — E44.1 MILD PROTEIN-CALORIE MALNUTRITION (HCC): ICD-10-CM

## 2024-01-23 DIAGNOSIS — L50.1 CHRONIC IDIOPATHIC URTICARIA: ICD-10-CM

## 2024-01-23 DIAGNOSIS — R19.7 VOMITING AND DIARRHEA: Primary | ICD-10-CM

## 2024-01-23 DIAGNOSIS — R63.0 ANOREXIA: ICD-10-CM

## 2024-01-23 DIAGNOSIS — E61.1 IRON DEFICIENCY: ICD-10-CM

## 2024-01-23 PROCEDURE — 99213 OFFICE O/P EST LOW 20 MIN: CPT | Performed by: STUDENT IN AN ORGANIZED HEALTH CARE EDUCATION/TRAINING PROGRAM

## 2024-01-23 PROCEDURE — 96365 THER/PROPH/DIAG IV INF INIT: CPT

## 2024-01-23 PROCEDURE — 96375 TX/PRO/DX INJ NEW DRUG ADDON: CPT

## 2024-01-23 PROCEDURE — 96372 THER/PROPH/DIAG INJ SC/IM: CPT

## 2024-01-23 PROCEDURE — 96367 TX/PROPH/DG ADDL SEQ IV INF: CPT

## 2024-01-23 PROCEDURE — 96361 HYDRATE IV INFUSION ADD-ON: CPT

## 2024-01-23 PROCEDURE — 90833 PSYTX W PT W E/M 30 MIN: CPT | Performed by: STUDENT IN AN ORGANIZED HEALTH CARE EDUCATION/TRAINING PROGRAM

## 2024-01-23 RX ORDER — EPINEPHRINE 1 MG/ML
0.3 INJECTION, SOLUTION, CONCENTRATE INTRAVENOUS
OUTPATIENT
Start: 2024-01-26

## 2024-01-23 RX ORDER — METHYLPREDNISOLONE SODIUM SUCCINATE 40 MG/ML
40 INJECTION, POWDER, LYOPHILIZED, FOR SOLUTION INTRAMUSCULAR; INTRAVENOUS ONCE
Status: CANCELLED
Start: 2024-01-26 | End: 2024-01-24

## 2024-01-23 RX ORDER — EPINEPHRINE 1 MG/ML
0.3 INJECTION, SOLUTION, CONCENTRATE INTRAVENOUS
Status: DISCONTINUED | OUTPATIENT
Start: 2024-01-23 | End: 2024-01-26 | Stop reason: HOSPADM

## 2024-01-23 RX ORDER — METHYLPREDNISOLONE SODIUM SUCCINATE 40 MG/ML
40 INJECTION, POWDER, LYOPHILIZED, FOR SOLUTION INTRAMUSCULAR; INTRAVENOUS ONCE
Status: COMPLETED | OUTPATIENT
Start: 2024-01-23 | End: 2024-01-23

## 2024-01-23 RX ORDER — MIRTAZAPINE 15 MG/1
15 TABLET, FILM COATED ORAL
Qty: 90 TABLET | Refills: 1 | Status: SHIPPED | OUTPATIENT
Start: 2024-01-23

## 2024-01-23 RX ADMIN — OMALIZUMAB 300 MG: 150 INJECTION, SOLUTION SUBCUTANEOUS at 16:24

## 2024-01-23 RX ADMIN — DIPHENHYDRAMINE HYDROCHLORIDE 50 MG: 50 INJECTION, SOLUTION INTRAMUSCULAR; INTRAVENOUS at 14:03

## 2024-01-23 RX ADMIN — FAMOTIDINE 40 MG: 10 INJECTION INTRAVENOUS at 14:35

## 2024-01-23 RX ADMIN — SODIUM CHLORIDE 1000 ML: 0.9 INJECTION, SOLUTION INTRAVENOUS at 14:02

## 2024-01-23 RX ADMIN — METHYLPREDNISOLONE SODIUM SUCCINATE 40 MG: 40 INJECTION, POWDER, FOR SOLUTION INTRAMUSCULAR; INTRAVENOUS at 15:40

## 2024-01-23 NOTE — PROGRESS NOTES
Celine Roa  tolerated treatment well with no complications.      Celine Roa is aware of future appt on 1/26/24 at 1.     AVS printed and given to Celine Roa:    No (Declined by Celine Roa) x

## 2024-01-26 ENCOUNTER — TELEMEDICINE (OUTPATIENT)
Dept: BEHAVIORAL/MENTAL HEALTH CLINIC | Facility: CLINIC | Age: 49
End: 2024-01-26
Payer: COMMERCIAL

## 2024-01-26 ENCOUNTER — HOSPITAL ENCOUNTER (OUTPATIENT)
Dept: INFUSION CENTER | Facility: HOSPITAL | Age: 49
End: 2024-01-26
Attending: INTERNAL MEDICINE
Payer: COMMERCIAL

## 2024-01-26 VITALS
HEART RATE: 85 BPM | SYSTOLIC BLOOD PRESSURE: 132 MMHG | OXYGEN SATURATION: 97 % | RESPIRATION RATE: 18 BRPM | DIASTOLIC BLOOD PRESSURE: 79 MMHG | TEMPERATURE: 97.2 F

## 2024-01-26 DIAGNOSIS — E61.1 IRON DEFICIENCY: ICD-10-CM

## 2024-01-26 DIAGNOSIS — D50.0 IRON DEFICIENCY ANEMIA DUE TO CHRONIC BLOOD LOSS: ICD-10-CM

## 2024-01-26 DIAGNOSIS — E44.1 MILD PROTEIN-CALORIE MALNUTRITION (HCC): ICD-10-CM

## 2024-01-26 DIAGNOSIS — L50.1 CHRONIC IDIOPATHIC URTICARIA: ICD-10-CM

## 2024-01-26 DIAGNOSIS — F33.41 RECURRENT MAJOR DEPRESSIVE DISORDER, IN PARTIAL REMISSION (HCC): Primary | ICD-10-CM

## 2024-01-26 DIAGNOSIS — R19.7 VOMITING AND DIARRHEA: Primary | ICD-10-CM

## 2024-01-26 DIAGNOSIS — R11.10 VOMITING AND DIARRHEA: Primary | ICD-10-CM

## 2024-01-26 DIAGNOSIS — R63.0 ANOREXIA: ICD-10-CM

## 2024-01-26 DIAGNOSIS — F41.1 GENERALIZED ANXIETY DISORDER: Chronic | ICD-10-CM

## 2024-01-26 PROCEDURE — 96361 HYDRATE IV INFUSION ADD-ON: CPT

## 2024-01-26 PROCEDURE — 96401 CHEMO ANTI-NEOPL SQ/IM: CPT

## 2024-01-26 PROCEDURE — 96375 TX/PRO/DX INJ NEW DRUG ADDON: CPT

## 2024-01-26 PROCEDURE — 96367 TX/PROPH/DG ADDL SEQ IV INF: CPT

## 2024-01-26 PROCEDURE — 96365 THER/PROPH/DIAG IV INF INIT: CPT

## 2024-01-26 PROCEDURE — 90834 PSYTX W PT 45 MINUTES: CPT | Performed by: SOCIAL WORKER

## 2024-01-26 RX ORDER — METHYLPREDNISOLONE SODIUM SUCCINATE 40 MG/ML
40 INJECTION, POWDER, LYOPHILIZED, FOR SOLUTION INTRAMUSCULAR; INTRAVENOUS ONCE
Status: CANCELLED
Start: 2024-01-30 | End: 2024-01-27

## 2024-01-26 RX ORDER — METHYLPREDNISOLONE SODIUM SUCCINATE 40 MG/ML
40 INJECTION, POWDER, LYOPHILIZED, FOR SOLUTION INTRAMUSCULAR; INTRAVENOUS ONCE
Status: COMPLETED | OUTPATIENT
Start: 2024-01-26 | End: 2024-01-26

## 2024-01-26 RX ORDER — METHOTREXATE 25 MG/ML
25 INJECTION INTRA-ARTERIAL; INTRAMUSCULAR; INTRATHECAL; INTRAVENOUS ONCE
Status: COMPLETED | OUTPATIENT
Start: 2024-01-26 | End: 2024-01-26

## 2024-01-26 RX ADMIN — DIPHENHYDRAMINE HYDROCHLORIDE 50 MG: 50 INJECTION, SOLUTION INTRAMUSCULAR; INTRAVENOUS at 12:27

## 2024-01-26 RX ADMIN — METHOTREXATE 25 MG: 25 SOLUTION INTRA-ARTERIAL; INTRAMUSCULAR; INTRATHECAL; INTRAVENOUS at 14:37

## 2024-01-26 RX ADMIN — METHYLPREDNISOLONE SODIUM SUCCINATE: 40 INJECTION, POWDER, FOR SOLUTION INTRAMUSCULAR; INTRAVENOUS at 13:37

## 2024-01-26 RX ADMIN — SODIUM CHLORIDE 1000 ML: 0.9 INJECTION, SOLUTION INTRAVENOUS at 12:27

## 2024-01-26 RX ADMIN — FAMOTIDINE 40 MG: 10 INJECTION INTRAVENOUS at 13:09

## 2024-01-26 NOTE — PSYCH
"Behavioral Health Psychotherapy Progress Note    Psychotherapy Provided: Individual Psychotherapy     1. Recurrent major depressive disorder, in partial remission (HCC)        2. Generalized anxiety disorder              Goals addressed in session: Goal 1     DATA: MSW met with Celine for session.  She apologized for missing her last session with MSW.  Discussed the holidays.  [place] was \"good this year.\"   The past few where not due to her father being ill over the holidays and passing away.  Discussed what had happened regarding her father.  \"It was hard.\"  A couple of weeks ago she did not feel well but is felling better now.  Discussed her children who are now all out of college.  In March she is looking forward to a Dr's appt in [place] with a Dr who specializes in bother of her chronic illness.      During this session, this clinician used the following therapeutic modalities: Cognitive Behavioral Therapy and Supportive Psychotherapy    Substance Abuse was not addressed during this session. If the client is diagnosed with a co-occurring substance use disorder, please indicate any changes in the frequency or amount of use: NA. Stage of change for addressing substance use diagnoses: No substance use/Not applicable    ASSESSMENT:  Celine Roa presents with a Euthymic/ normal mood.     her affect is Normal range and intensity, which is congruent, with her mood and the content of the session. The client has made progress on their goals.     Celine Roa presents with a none risk of suicide, none risk of self-harm, and none risk of harm to others.    For any risk assessment that surpasses a \"low\" rating, a safety plan must be developed.    A safety plan was indicated: no  If yes, describe in detail     PLAN: Between sessions, Celine Roa will address her tx plan goals.. At the next session, the therapist will use Cognitive Behavioral Therapy and Supportive Psychotherapy to address tx plan goals and issues that " arise in between sessions.    Behavioral Health Treatment Plan and Discharge Planning: Celine Roa is aware of and agrees to continue to work on their treatment plan. They have identified and are working toward their discharge goals. yes    Visit start and stop times:    01/26/24  Start Time: 1713  Stop Time: 1800  Total Visit Time: 47 minutes    Virtual Regular Visit    Verification of patient location:    Patient is located at Home in the following state in which I hold an active license [place]      Assessment/Plan:    Problem List Items Addressed This Visit       Generalized anxiety disorder (Chronic)    Recurrent major depressive disorder, in partial remission (HCC) - Primary         Goals addressed in session: Goal 1          Reason for visit is No chief complaint on file.           Encounter provider John Black    Provider located at [facility]  [facility]  [address] [address] [phone]      Recent Visits  No visits were found meeting these conditions.  Showing recent visits within past 7 days and meeting all other requirements  Today's Visits  Date Type Provider Dept   01/26/24 Telemedicine John Black  [facility]   Showing today's visits and meeting all other requirements  Future Appointments  No visits were found meeting these conditions.  Showing future appointments within next 150 days and meeting all other requirements       The patient was identified by name and date of birth. Celine Roa was informed that this is a telemedicine visit and that the visit is being conducted throughthe [employer] platform. She agrees to proceed..  My office door was closed. No one else was in the room.  She acknowledged consent and understanding of privacy and security of the video platform. The patient has agreed to participate and understands they can discontinue the visit at any time.    Patient is aware  this is a billable service.     Subjective  Celine Roa is a 48 y.o. female  .      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 pt denies, documented in cardio note    Pancreatitis     sphinger of     Psychiatric disorder     RA (rheumatoid arthritis) (HCC)     Seizures (HCC)     Ulcerative colitis (HCC)     Visual impairment        Past Surgical History:   Procedure Laterality Date    ABDOMINAL SURGERY  2017    APPENDECTOMY      CHOLECYSTECTOMY      COLONOSCOPY  2019    CYSTOSCOPY N/A 6/8/2023    Procedure: CYSTOSCOPY, mini TURBT with fulgeration;  Surgeon: Tee Bruno MD;  Location: [facility];  Service: Urology    EGD AND COLONOSCOPY N/A 09/12/2017    Procedure: EGD AND COLONOSCOPY;  Surgeon: Tommy Oliver MD;  Location: [facility];  Service: Gastroenterology    ERCP N/A 09/15/2017    Procedure: ENDOSCOPIC RETROGRADE CHOLANGIOPANCREATOGRAPHY (ERCP);  Surgeon: Dre Soto MD;  [facility];  Service: Gastroenterology    HYSTERECTOMY      KNEE SURGERY Right     LAPAROSCOPIC ENDOMETRIOSIS FULGURATION      ORIF ANKLE FRACTURE Left     MS ARTHRS KNE SURG W/MENISCECTOMY MED/LAT W/SHVG Left 05/12/2017    Procedure: POSSIBLE MEDIAL MENISECTOMY;  Surgeon: Kristian Avila MD;  Location: [facility];  Service: Orthopedics    MS ARTHRS KNEE DEBRIDEMENT/SHAVING ARTCLR CRTLG Left 05/12/2017    Procedure: KNEE ARTHROSCOPY  "EVALUATION, CHONDROPLASTY;  Surgeon: Kristian Avila MD;  [facility]: [facility];  Service: Orthopedics    [facility] LAPS ABD PRTM&OMENTUM DX W/WO SPEC BR/WA SPX N/A 12/12/2017    Procedure: LAPAROSCOPY DIAGNOSTIC  WITH LYSIS OF ADHESIONS;  Surgeon: Olaf John DO;  Location:  MAIN OR;  Service: General    UPPER GASTROINTESTINAL ENDOSCOPY  2019       Current Outpatient Medications   Medication Sig Dispense Refill    Ascorbic Acid (vitamin C) 1000 MG tablet Take 1,000 mg by mouth 2 (two) times a day      B-D 3CC LUER-SHAMEKA SYR 25GX1\" 25G X 1\" 3 ML MISC       butalbital-acetaminophen-caffeine (FIORICET,ESGIC) -40 mg per tablet Take 1 tablet by mouth every 8 (eight) hours as needed for migraine 20 tablet 1    cetirizine (ZyrTEC) 10 mg tablet Take 20 mg by mouth daily      cholecalciferol (VITAMIN D3) 1,000 units tablet Take 1,000 Units by mouth 2 (two) times a day      Cholecalciferol 10 MCG /0.025ML LIQD Take 1,000 Units by mouth 2 (two) times a day 750 mL 1    cyanocobalamin (VITAMIN B-12) 100 mcg tablet Take by mouth daily      diclofenac (VOLTAREN) 75 mg EC tablet       EpiPen 2-Malachi 0.3 MG/0.3ML SOAJ INJECT 1 PEN INTO THE MUSCLE AS NEEDED FOR ANAPHALAXIS      famotidine (PEPCID) 40 MG tablet TAKE ONE TABLET BY MOUTH ONCE DAILY 90 tablet 1    folic acid (KP Folic Acid) 1 mg tablet Take 1 tablet (1 mg total) by mouth daily 30 tablet 3    hydrOXYzine HCL (ATARAX) 25 mg tablet       Lactobacillus (PROBIOTIC ACIDOPHILUS PO) Take by mouth      Lactobacillus-Inulin ([facility]) CAPS Take 200 mg by mouth daily      levalbuterol (XOPENEX HFA) 45 mcg/act inhaler Inhale 1-2 puffs every 4 (four) hours as needed for shortness of breath 45 g 2    levothyroxine 100 mcg tablet Take 100 mcg by mouth every other day      liothyronine (CYTOMEL) 5 mcg tablet Take 1 tablet (5 mcg total) by mouth daily Do not start before June 14, 2023. 30 tablet 0    magnesium gluconate (MAGONATE) 500 mg tablet Take 500 mg by mouth 2 " "(two) times a day      Melatonin 3 MG SUBL       melatonin 3 mg Take 1 tablet (3 mg total) by mouth daily at bedtime 30 tablet 0    methotrexate 50 MG/2ML injection Inject 1 mL (25 mg total) under the skin once a week 4 mL 10    methotrexate 50 MG/2ML injection Inject 1 mL (25 mg total) under the skin once a week 4 mL 10    mirtazapine (REMERON) 15 mg tablet Take 1 tablet (15 mg total) by mouth daily at bedtime 90 tablet 1    montelukast (SINGULAIR) 10 mg tablet Take 1 tablet (10 mg total) by mouth daily at bedtime  0    NEEDLE, DISP, 27 G (B-D DISP NEEDLE 20ES5-0/4\") 27G X 1-1/4\" MISC Use once a week 100 each 0    NON FORMULARY immunoglobin sq 6g 30 ml once a week      nystatin (MYCOSTATIN) 500,000 units/5 mL suspension Swish and spit 5 mL (500,000 Units total) 4 (four) times a day (Patient not taking: Reported on 9/13/2023) 473 mL 0    omalizumab (XOLAIR) 150 mg Inject 2.4 mL (300 mg total) under the skin every 21 days 1 each 16    pantoprazole (PROTONIX) 40 mg tablet TAKE ONE TABLET BY MOUTH TWICE A DAY (Patient not taking: Reported on 11/27/2023) 60 tablet 5    predniSONE 20 mg tablet Take 40mg per day for 1 week, then 30mg per day for 1 week, then 20mg per day for 1 week and then 10mg per day for 1 week. 100 tablet 0    Riboflavin (VITAMIN B-2 PO) Take by mouth      Riboflavin (Vitamin B-2) 100 MG TABS Take 100 mg by mouth 2 (two) times a day      risankizumab-rzaa (Skyrizi) 360 MG/2.4ML SOCT Take first on body injector (OBI) under skin on week 12 then every 8 weeks there after 2.4 mL 5    TechLite Lancets MISC       traMADol (Ultram) 50 mg tablet Take 1 tablet (50 mg total) by mouth every 12 (twelve) hours as needed for moderate pain or severe pain (Patient not taking: Reported on 9/14/2023) 60 tablet 0     No current facility-administered medications for this visit.        Allergies   Allergen Reactions    Aimovig [Erenumab-Aooe] Anaphylaxis    Amitriptyline Anaphylaxis     According to the patient she had " nphylaxis    Aspergillus Allergy Skin Test Other (See Comments), Hives and Swelling    Azathioprine Other (See Comments) and Anaphylaxis     pancreatitis  According to patient she needed Epipen    Banana - Food Allergy Anaphylaxis, Itching, Swelling and Tongue Swelling    Buspar [Buspirone] Hives and Shortness Of Breath    Citric Acid-Polysorbate 80 Abdominal Pain, Anaphylaxis, Anxiety, Bradycardia, Diarrhea, Fatigue, GI Intolerance, Headache, Hives, Hyperactivity, Itching, Lip Swelling, Nausea Only, Palpitations, Rash, Shortness Of Breath, Tachycardia, Throat Swelling, Tongue Swelling and Vomiting    Corn Dextrin Anaphylaxis     Any corn based products    Eggs Or Egg-Derived Products - Food Allergy Anaphylaxis    Epinephrine Anaphylaxis    Erenumab Anaphylaxis     patient sent portal message stating she had anaphylaxis  patient sent portal message stating she had anaphylaxis      Gadolinium Abdominal Pain, Anaphylaxis, Anxiety, Confusion, Delirium, Dizziness, Eye Swelling, Fatigue, GI Intolerance, Headache, Hives, Irritability, Itching, Lip Swelling, Nausea Only, Palpitations, Photosensitivity, Rash, Seizures, Shortness Of Breath, Swelling, Syncope, Throat Swelling, Tongue Swelling, Tremor, Visual Disturbance and Vomiting    Gelatin - Food Allergy Anaphylaxis    Heparin Hives     Painful welts     Lactated Ringers Shortness Of Breath     Pt questions this allergy, pt has received LR multiple times without reaction    Lavender Oil Anaphylaxis     Other reaction(s): anaphalxis    Malto Dextrin [Dextrin] Anaphylaxis    Other Anaphylaxis     Gel caps, maltodextrose, dextrose,grapes, lavender     Penicillium Notatum Shortness Of Breath and Swelling    Red Dye - Food Allergy Anaphylaxis and Other (See Comments)     intolerance    Sumatriptan Anaphylaxis     Bradycardia, sob, back pressure, head pain,throat tightness  According to the patient she had anphylaxis    Ustekinumab Anaphylaxis    Contrast [Iodinated Contrast  "Media] Other (See Comments)     Pt states needs to be premedicated prior to injection    Corn Oil - Food Allergy - Food Allergy Hives    Humira [Adalimumab] Other (See Comments)     \"I develop drug induced lupus\"    Ibuprofen Hives and Throat Swelling    Polyethylene Glycol Diarrhea and Vomiting    Remicade [Infliximab] Other (See Comments)     \"I develop drug induced lupus\"    Soy Allergy - Food Allergy Other (See Comments)    Sulfa Antibiotics Hives and Other (See Comments)    Sulfate Hives    Sulfites - Food Allergy Hives    Sweet Corn Extract Skin Test - Food Allergy Hives and Itching    Chlorhexidine Itching    Freederm Adhesive Remover [New Skin] Rash    Histamine Hives, Rash and Other (See Comments)     Intolerance      Wound Dressings Rash       Review of Systems    Video Exam    There were no vitals filed for this visit.    Physical Exam         "

## 2024-01-26 NOTE — PLAN OF CARE
Problem: Knowledge Deficit  Goal: Patient/family/caregiver demonstrates understanding of disease process, treatment plan, medications, and discharge instructions  Description: Complete learning assessment and assess knowledge base.  Interventions:  - Provide teaching at level of understanding  - Provide teaching via preferred learning methods  Outcome: Progressing     Problem: INFECTION - ADULT  Goal: Absence of fever/infection during neutropenic period  Description: INTERVENTIONS:  - Monitor WBC    Outcome: Progressing  Goal: Absence or prevention of progression during hospitalization  Description: INTERVENTIONS:  - Assess and monitor for signs and symptoms of infection  - Monitor lab/diagnostic results  - Monitor all insertion sites, i.e. indwelling lines, tubes, and drains  - Monitor endotracheal if appropriate and nasal secretions for changes in amount and color  - [place] appropriate cooling/warming therapies per order  - Administer medications as ordered  - Instruct and encourage patient and family to use good hand hygiene technique  - Identify and instruct in appropriate isolation precautions for identified infection/condition  Outcome: Progressing

## 2024-01-26 NOTE — PSYCH
"MEDICATION MANAGEMENT NOTE        [facility]      Name and Date of Birth:  Celine Roa 48 y.o. 1975 MRN: [id number]    Date of Visit: 1/23/2024     Visit Time    Visit Start Time: 1110  Visit Stop Time: 1140  Total Visit Duration:  30 minutes    Reason for Visit: Follow-up visit regarding medication management     Chief Complaint: \"I feel better\"    SUBJECTIVE:    Celine Roa is a 48 y.o., female, possessing a past psychiatric history significant for anxiety, medically complicated by mast cell activation syndrome, who was personally seen and evaluated at the [facility] outpatient clinic for follow-up regarding medication management.  She is currently prescribed mirtazapine 15mg qHS, and was started on Pristiq at last appointment.    During interview today, Celine states that she is feeling \"pretty good'.  Admits that she discontinued the Pristiq, as it was making her feel more nauseous.  She states that she has continue with the mirtazapine 15 mg at bedtime, and she states that is working well for her.  She admits that when she tried reducing the dose, she felt more nauseous and had more trouble with sleep.  She states she is tolerating the 15 mg well without any further side effects.  States that health issues continue to plague her, particularly her Crohn's disease.  States she has been struggling with it for almost 30 years, and because of her corn allergy, there are certain medications she cannot take due to fillers.  She states she is planning to follow-up with a specialist in [place].  Denies thoughts to herself or anyone else, denies any hallucinations.  States that she is getting support from her family, and is hopeful that as her  is also getting support for his own mental health, they will grow closer.      Current Rating Scores:   None completed today.    Psychiatric Review Of Systems:    Appetite: no, no " change  Adverse eating: no  Weight changes: no  Insomnia/sleeplessness: no  Fatigue/anergy: no  Anhedonia/lack of interest: no  Attention/concentration: no  Psychomotor agitation/retardation: no  Somatic symptoms: yes, nausea  Anxiety/panic attack: worrying  Kendal/hypomania: no  Hopelessness/helplessness/worthlessness: feeling better  Self-injurious behavior/high-risk behavior: no  Suicidal ideation: no  Homicidal ideation: no  Auditory hallucinations: no  Visual hallucinations: no  Other perceptual disturbances: no  Delusional thinking: no  Obsessive/compulsive symptoms: no    Review Of Systems:      Constitutional feeling poorly, feeling tired, and as noted in HPI   ENT negative   Cardiovascular negative   Respiratory negative   Gastrointestinal abdominal discomfort, abdominal cramps, and as noted in HPI   Genitourinary negative   Musculoskeletal negative   Integumentary negative   Neurological negative   Endocrine negative   Other Symptoms none, all other systems are negative     History Review:   The following portions of the patient's history were reviewed and updated as appropriate: allergies, current medications, past family history, past medical history, past social history, past surgical history and problem list..         OBJECTIVE:     Vital signs in last 24 hours:    There were no vitals filed for this visit.    Mental Status Evaluation:    Appearance age appropriate, casually dressed   Behavior cooperative, mildly anxious   Speech normal rate, normal volume, normal pitch   Mood improved, euthymic   Affect normal range and intensity, appropriate   Thought Processes organized, goal directed   Associations intact associations   Thought Content no overt delusions   Perceptual Disturbances: no auditory hallucinations, no visual hallucinations   Abnormal Thoughts  Risk Potential Suicidal ideation - None, occasional passive death wish, but denies any active suicidal ideation, intent or plan at present,  contracts for safety, would not harm self, would got to Emergency Room if feeling unsafe  Homicidal ideation - None  Potential for aggression - No   Orientation oriented to person, place, time/date, and situation   Memory recent and remote memory grossly intact   Consciousness alert and awake   Attention Span Concentration Span attention span and concentration are age appropriate   Intellect appears to be of average intelligence   Insight intact   Judgement intact   Muscle Strength and  Gait normal muscle strength and normal muscle tone, normal gait and normal balance   Motor activity no abnormal movements   Fund of Knowledge adequate knowledge of current events  adequate fund of knowledge regarding past history  adequate fund of knowledge regarding vocabulary    Pain none   Pain Scale 0       Laboratory Results: I have personally reviewed all pertinent laboratory/tests results    Recent Labs (last 2 months):   Appointment on 01/15/2024   Component Date Value    Iron 01/15/2024 59     Vitamin C 01/15/2024 0.4     WBC 01/15/2024 14.91 (H)     RBC 01/15/2024 4.60     Hemoglobin 01/15/2024 14.7     Hematocrit 01/15/2024 44.5     MCV 01/15/2024 97     MCH 01/15/2024 32.0     MCHC 01/15/2024 33.0     RDW 01/15/2024 15.0     MPV 01/15/2024 8.4 (L)     Platelets 01/15/2024 448 (H)     nRBC 01/15/2024 0     Neutrophils Relative 01/15/2024 73     Immat GRANS % 01/15/2024 1     Lymphocytes Relative 01/15/2024 15     Monocytes Relative 01/15/2024 7     Eosinophils Relative 01/15/2024 3     Basophils Relative 01/15/2024 1     Neutrophils Absolute 01/15/2024 11.08 (H)     Immature Grans Absolute 01/15/2024 0.10     Lymphocytes Absolute 01/15/2024 2.17     Monocytes Absolute 01/15/2024 1.05     Eosinophils Absolute 01/15/2024 0.37     Basophils Absolute 01/15/2024 0.14 (H)     Sodium 01/15/2024 139     Potassium 01/15/2024 3.6     Chloride 01/15/2024 108     CO2 01/15/2024 22     ANION GAP 01/15/2024 9     BUN 01/15/2024 18      Creatinine 01/15/2024 0.51 (L)     Glucose 01/15/2024 122     Calcium 01/15/2024 8.7     AST 01/15/2024 21     ALT 01/15/2024 39     Alkaline Phosphatase 01/15/2024 71     Total Protein 01/15/2024 6.3 (L)     Albumin 01/15/2024 3.8     Total Bilirubin 01/15/2024 0.21     eGFR 01/15/2024 112     CRP 01/15/2024 3.4 (H)    Appointment on 12/12/2023   Component Date Value    Ova + Parasite Exam 12/12/2023 No ova, cysts, or parasites seen.                                                             .  One negative specimen does not rule out the possibility of a  parasitic infection.     Giardia Ag, Stl 12/12/2023 Negative     Calprotectin 12/12/2023 1370 (H)    Appointment on 12/08/2023   Component Date Value    WBC 12/08/2023 7.44     RBC 12/08/2023 4.94     Hemoglobin 12/08/2023 14.8     Hematocrit 12/08/2023 45.3     MCV 12/08/2023 92     MCH 12/08/2023 30.0     MCHC 12/08/2023 32.7     RDW 12/08/2023 17.6 (H)     MPV 12/08/2023 8.9     Platelets 12/08/2023 438 (H)     nRBC 12/08/2023 1     Neutrophils Relative 12/08/2023 60     Immat GRANS % 12/08/2023 1     Lymphocytes Relative 12/08/2023 26     Monocytes Relative 12/08/2023 8     Eosinophils Relative 12/08/2023 3     Basophils Relative 12/08/2023 2 (H)     Neutrophils Absolute 12/08/2023 4.58     Immature Grans Absolute 12/08/2023 0.04     Lymphocytes Absolute 12/08/2023 1.93     Monocytes Absolute 12/08/2023 0.57     Eosinophils Absolute 12/08/2023 0.20     Basophils Absolute 12/08/2023 0.12 (H)     Sodium 12/08/2023 139     Potassium 12/08/2023 3.4 (L)     Chloride 12/08/2023 106     CO2 12/08/2023 25     ANION GAP 12/08/2023 8     BUN 12/08/2023 12     Creatinine 12/08/2023 0.52 (L)     Glucose 12/08/2023 86     Calcium 12/08/2023 8.9     AST 12/08/2023 16     ALT 12/08/2023 18     Alkaline Phosphatase 12/08/2023 67     Total Protein 12/08/2023 7.0     Albumin 12/08/2023 4.0     Total Bilirubin 12/08/2023 0.33     eGFR 12/08/2023 111     CRP 12/08/2023 3.3 (H)      Hepatitis B Surface Ag 12/08/2023 Non-reactive     Hepatitis C Ab 12/08/2023 Non-reactive     Hep B C IgM 12/08/2023 Non-reactive     Hep B Core Total Ab 12/08/2023 Non-reactive     QFT Nil 12/08/2023 0.01     QFT TB1-NIL 12/08/2023 0.00     QFT TB2-NIL 12/08/2023 0.00     QFT Mitogen-NIL 12/08/2023 9.99     QFT Final Interpretation 12/08/2023 Negative     Iron Saturation 12/08/2023 40     TIBC 12/08/2023 307     Iron 12/08/2023 124     UIBC 12/08/2023 183     Ferritin 12/08/2023 359 (H)        Suicide/Homicide Risk Assessment:    The following interventions are recommended: contracts for safety at present - agrees to go to ED if feeling unsafe, contracts for safety at present - agrees to call Crisis Intervention Service if feeling unsafe. Although patient's acute lethality risk is low, long-term/chronic lethality risk is mildly elevated in the presence of depression. At the current moment, Celine is future-oriented, forward-thinking, and demonstrates ability to act in a self-preserving manner as evidenced by volitionally presenting to the clinic today, seeking treatment. To mitigate future risk, patient should adhere to the recommendations below, avoid alcohol/illicit substance use, utilize community-based resources and familiar support and prioritize mental health treatment. Presently, patient denies active suicidal/homicidal ideation in addition to thoughts of self-injury; contracts for safety.  At conclusion of evaluation, patient is amenable to the recommendations below. Patient is amenable to calling/contacting the outpatient office including this writer if any acute adverse effects of their medication regimen arise in addition to any comments or concerns pertaining to their psychiatric management.  Patient is amenable to calling/contacting crisis and/or attending to the nearest emergency department if their clinical condition deteriorates to assure their safety and stability, stating that they are able to  appropriately confide in their  regarding their psychiatric state.    Assessment/Plan:     Diagnoses and all orders for this visit:    Recurrent major depressive disorder, in partial remission (HCC)    Generalized anxiety disorder  -     mirtazapine (REMERON) 15 mg tablet; Take 1 tablet (15 mg total) by mouth daily at bedtime     Celine is a 48-year-old female who has a history of depression and anxiety, mostly related to her medical issues.  She has been recently been hospitalized following an overdose on benzodiazepines.  She participated in the partial hospitalization program has noted improvement since then.  She does not appear to be in acute danger to herself or others, wants to work on her issues with her therapist.      Treatment Recommendations/Precautions:    Will continue with Mirtazapine 15mg qHS.    Could also consider wellbutrin as well.   Medication management every 4 weeks  Continue psychotherapy with own therapist  Aware of 24 hour and weekend coverage for urgent situations accessed by calling [facility] main practice number  Patient advised to call 911 if feeling suicidal or homicidal before acting out on their thoughts and they expressed understanding.  Medications Risks/Benefits      Risks, Benefits And Possible Side Effects Of Medications:    Risks, benefits, and possible side effects of medications explained to Celine and she verbalizes understanding and agreement for treatment. including: Risks and potential side effects of medication discussed with patient including serotonin syndrome, SIADH, worsening depression, suicidality, induction of concetta, GI upset, headaches, activation, sexual side effects, sedation, potential drug interactions, and others. Patient expressed understanding.   .     Controlled Medication Discussion:     Not applicable    Psychotherapy Provided:   Individual psychotherapy provided: Yes  Counseling was provided during the session today for 17  minutes.  Goals discussed during in session: continue improvement in anxiety and improve depression.   Discussed with Celine coping with health issues and ongoing anxiety.   Coping strategies including improving self-esteem, increasing energy, increasing interest in usual activities, maintain positive attitude, and talking to a therapist reviewed with Celine.   Reassurance and supportive therapy provided.          Treatment Plan:    Completed and signed during the session: Not applicable - Treatment Plan not due at this session    This note was not shared with the patient due to this is a psychotherapy note      Diogenes Lewis DO 01/25/24

## 2024-01-26 NOTE — PROGRESS NOTES
Celine Roa  tolerated treatment well with no complications.      Celine Roa is aware of future appt on 1/30 at 1230.     AVS printed and given to Celine Roa:   No (Declined by Celine Roa) x

## 2024-01-29 ENCOUNTER — OFFICE VISIT (OUTPATIENT)
Dept: FAMILY MEDICINE CLINIC | Facility: HOME HEALTHCARE | Age: 49
End: 2024-01-29
Payer: COMMERCIAL

## 2024-01-29 ENCOUNTER — APPOINTMENT (OUTPATIENT)
Dept: LAB | Facility: HOSPITAL | Age: 49
End: 2024-01-29
Payer: COMMERCIAL

## 2024-01-29 ENCOUNTER — HOSPITAL ENCOUNTER (OUTPATIENT)
Dept: ULTRASOUND IMAGING | Facility: HOSPITAL | Age: 49
Discharge: HOME | End: 2024-01-29
Payer: COMMERCIAL

## 2024-01-29 VITALS
BODY MASS INDEX: 26.66 KG/M2 | OXYGEN SATURATION: 99 % | HEART RATE: 115 BPM | HEIGHT: 65 IN | WEIGHT: 160 LBS | DIASTOLIC BLOOD PRESSURE: 88 MMHG | SYSTOLIC BLOOD PRESSURE: 112 MMHG | TEMPERATURE: 98.3 F | RESPIRATION RATE: 18 BRPM

## 2024-01-29 DIAGNOSIS — K59.00 CONSTIPATION, UNSPECIFIED CONSTIPATION TYPE: ICD-10-CM

## 2024-01-29 DIAGNOSIS — D84.9 IMMUNOSUPPRESSED STATUS (HCC): ICD-10-CM

## 2024-01-29 DIAGNOSIS — G35 MS (MULTIPLE SCLEROSIS) (HCC): ICD-10-CM

## 2024-01-29 DIAGNOSIS — R91.8 RIGHT LOWER LOBE LUNG MASS: Primary | ICD-10-CM

## 2024-01-29 DIAGNOSIS — Z13.9 SCREENING FOR UNSPECIFIED CONDITION: ICD-10-CM

## 2024-01-29 DIAGNOSIS — T38.0X5A IMMUNOSUPPRESSION DUE TO CHRONIC STEROID USE: ICD-10-CM

## 2024-01-29 DIAGNOSIS — D89.40 MAST CELL ACTIVATION SYNDROME (HCC): ICD-10-CM

## 2024-01-29 DIAGNOSIS — E44.1 MILD PROTEIN-CALORIE MALNUTRITION (HCC): ICD-10-CM

## 2024-01-29 DIAGNOSIS — E61.1 IRON DEFICIENCY: ICD-10-CM

## 2024-01-29 DIAGNOSIS — K50.119 CROHN'S DISEASE OF COLON WITH COMPLICATION (HCC): ICD-10-CM

## 2024-01-29 DIAGNOSIS — D83.9 COMMON VARIABLE IMMUNODEFICIENCY (HCC): ICD-10-CM

## 2024-01-29 DIAGNOSIS — E56.9 MULTIPLE VITAMIN DEFICIENCY DISEASE: ICD-10-CM

## 2024-01-29 DIAGNOSIS — K50.919 CROHN'S DISEASE WITH COMPLICATION, UNSPECIFIED GASTROINTESTINAL TRACT LOCATION (HCC): ICD-10-CM

## 2024-01-29 DIAGNOSIS — M32.9 SYSTEMIC LUPUS ERYTHEMATOSUS, UNSPECIFIED SLE TYPE, UNSPECIFIED ORGAN INVOLVEMENT STATUS (HCC): ICD-10-CM

## 2024-01-29 DIAGNOSIS — D84.821 IMMUNOSUPPRESSION DUE TO CHRONIC STEROID USE: ICD-10-CM

## 2024-01-29 DIAGNOSIS — Z13.9 ENCOUNTER FOR SCREENING: ICD-10-CM

## 2024-01-29 DIAGNOSIS — Z79.52 IMMUNOSUPPRESSION DUE TO CHRONIC STEROID USE: ICD-10-CM

## 2024-01-29 DIAGNOSIS — I50.30 DIASTOLIC CONGESTIVE HEART FAILURE, UNSPECIFIED HF CHRONICITY (HCC): ICD-10-CM

## 2024-01-29 DIAGNOSIS — R19.7 DIARRHEA, UNSPECIFIED TYPE: ICD-10-CM

## 2024-01-29 DIAGNOSIS — E13.69 OTHER SPECIFIED DIABETES MELLITUS WITH OTHER SPECIFIED COMPLICATION, UNSPECIFIED WHETHER LONG TERM INSULIN USE (HCC): ICD-10-CM

## 2024-01-29 LAB
ALBUMIN SERPL BCP-MCNC: 4.2 G/DL (ref 3.5–5)
ALP SERPL-CCNC: 60 U/L (ref 34–104)
ALT SERPL W P-5'-P-CCNC: 53 U/L (ref 7–52)
ANION GAP SERPL CALCULATED.3IONS-SCNC: 9 MMOL/L
AST SERPL W P-5'-P-CCNC: 30 U/L (ref 13–39)
BASOPHILS # BLD AUTO: 0.12 THOUSANDS/ÂΜL (ref 0–0.1)
BASOPHILS NFR BLD AUTO: 1 % (ref 0–1)
BILIRUB SERPL-MCNC: 0.62 MG/DL (ref 0.2–1)
BUN SERPL-MCNC: 12 MG/DL (ref 5–25)
CALCIUM SERPL-MCNC: 9 MG/DL (ref 8.4–10.2)
CHLORIDE SERPL-SCNC: 99 MMOL/L (ref 96–108)
CHOLEST SERPL-MCNC: 194 MG/DL
CO2 SERPL-SCNC: 26 MMOL/L (ref 21–32)
CREAT SERPL-MCNC: 0.7 MG/DL (ref 0.6–1.3)
CRP SERPL QL: 3.4 MG/L
EOSINOPHIL # BLD AUTO: 0.32 THOUSAND/ÂΜL (ref 0–0.61)
EOSINOPHIL NFR BLD AUTO: 3 % (ref 0–6)
ERYTHROCYTE [DISTWIDTH] IN BLOOD BY AUTOMATED COUNT: 14.4 % (ref 11.6–15.1)
EST. AVERAGE GLUCOSE BLD GHB EST-MCNC: 105 MG/DL
FERRITIN SERPL-MCNC: 440 NG/ML (ref 11–307)
FOLATE SERPL-MCNC: 13.7 NG/ML
GFR SERPL CREATININE-BSD FRML MDRD: 101 ML/MIN/1.73SQ M
GLUCOSE P FAST SERPL-MCNC: 90 MG/DL (ref 65–99)
HBA1C MFR BLD: 5.3 %
HCT VFR BLD AUTO: 48 % (ref 34.8–46.1)
HDLC SERPL-MCNC: 63 MG/DL
HGB BLD-MCNC: 15.7 G/DL (ref 11.5–15.4)
IMM GRANULOCYTES # BLD AUTO: 0.05 THOUSAND/UL (ref 0–0.2)
IMM GRANULOCYTES NFR BLD AUTO: 1 % (ref 0–2)
IRON SATN MFR SERPL: 25 % (ref 15–50)
IRON SERPL-MCNC: 76 UG/DL (ref 50–212)
LDLC SERPL CALC-MCNC: 69 MG/DL (ref 0–100)
LYMPHOCYTES # BLD AUTO: 2.46 THOUSANDS/ÂΜL (ref 0.6–4.47)
LYMPHOCYTES NFR BLD AUTO: 25 % (ref 14–44)
MCH RBC QN AUTO: 32 PG (ref 26.8–34.3)
MCHC RBC AUTO-ENTMCNC: 32.7 G/DL (ref 31.4–37.4)
MCV RBC AUTO: 98 FL (ref 82–98)
MONOCYTES # BLD AUTO: 0.61 THOUSAND/ÂΜL (ref 0.17–1.22)
MONOCYTES NFR BLD AUTO: 6 % (ref 4–12)
NEUTROPHILS # BLD AUTO: 6.17 THOUSANDS/ÂΜL (ref 1.85–7.62)
NEUTS SEG NFR BLD AUTO: 64 % (ref 43–75)
NONHDLC SERPL-MCNC: 131 MG/DL
NRBC BLD AUTO-RTO: 0 /100 WBCS
PLATELET # BLD AUTO: 410 THOUSANDS/UL (ref 149–390)
PMV BLD AUTO: 8.9 FL (ref 8.9–12.7)
POTASSIUM SERPL-SCNC: 4.6 MMOL/L (ref 3.5–5.3)
PROT SERPL-MCNC: 7.1 G/DL (ref 6.4–8.4)
RBC # BLD AUTO: 4.91 MILLION/UL (ref 3.81–5.12)
SODIUM SERPL-SCNC: 134 MMOL/L (ref 135–147)
T3FREE SERPL-MCNC: 3.39 PG/ML (ref 2.5–3.9)
T4 FREE SERPL-MCNC: 0.9 NG/DL (ref 0.61–1.12)
TIBC SERPL-MCNC: 310 UG/DL (ref 250–450)
TRIGL SERPL-MCNC: 308 MG/DL
TSH SERPL DL<=0.05 MIU/L-ACNC: 0.21 UIU/ML (ref 0.45–4.5)
UIBC SERPL-MCNC: 234 UG/DL (ref 155–355)
VIT B12 SERPL-MCNC: 824 PG/ML (ref 180–914)
WBC # BLD AUTO: 9.73 THOUSAND/UL (ref 4.31–10.16)

## 2024-01-29 PROCEDURE — 83036 HEMOGLOBIN GLYCOSYLATED A1C: CPT

## 2024-01-29 PROCEDURE — 86140 C-REACTIVE PROTEIN: CPT

## 2024-01-29 PROCEDURE — 84590 ASSAY OF VITAMIN A: CPT

## 2024-01-29 PROCEDURE — 80061 LIPID PANEL: CPT

## 2024-01-29 PROCEDURE — 80053 COMPREHEN METABOLIC PANEL: CPT

## 2024-01-29 PROCEDURE — 83550 IRON BINDING TEST: CPT

## 2024-01-29 PROCEDURE — 82746 ASSAY OF FOLIC ACID SERUM: CPT

## 2024-01-29 PROCEDURE — 83540 ASSAY OF IRON: CPT

## 2024-01-29 PROCEDURE — 36415 COLL VENOUS BLD VENIPUNCTURE: CPT

## 2024-01-29 PROCEDURE — 3044F HG A1C LEVEL LT 7.0%: CPT | Performed by: STUDENT IN AN ORGANIZED HEALTH CARE EDUCATION/TRAINING PROGRAM

## 2024-01-29 PROCEDURE — 82607 VITAMIN B-12: CPT

## 2024-01-29 PROCEDURE — 99214 OFFICE O/P EST MOD 30 MIN: CPT | Performed by: FAMILY MEDICINE

## 2024-01-29 PROCEDURE — 76830 TRANSVAGINAL US NON-OB: CPT

## 2024-01-29 PROCEDURE — 76856 US EXAM PELVIC COMPLETE: CPT

## 2024-01-29 PROCEDURE — 85025 COMPLETE CBC W/AUTO DIFF WBC: CPT

## 2024-01-29 PROCEDURE — 84481 FREE ASSAY (FT-3): CPT

## 2024-01-29 PROCEDURE — 82728 ASSAY OF FERRITIN: CPT

## 2024-01-29 PROCEDURE — 84439 ASSAY OF FREE THYROXINE: CPT

## 2024-01-29 PROCEDURE — 76700 US EXAM ABDOM COMPLETE: CPT

## 2024-01-29 PROCEDURE — 84443 ASSAY THYROID STIM HORMONE: CPT

## 2024-01-29 NOTE — PROGRESS NOTES
Assessment/Plan:    No problem-specific Assessment & Plan notes found for this encounter.       Diagnoses and all orders for this visit:    Right lower lobe lung mass  Comments:  CT lung to f/up 7/23 R pleura  Orders:  -     CT chest wo contrast; Future    Common variable immunodeficiency (HCC)  Comments:  no changes    Immunosuppressed status (HCC)    MS (multiple sclerosis) (HCC)    Crohn's disease of colon with complication (HCC)  Comments:  treatment per GI; f/up [facility]    Diastolic congestive heart failure, unspecified HF chronicity ([facility])  Comments:  stable; sees Dr. Gallegos (cardiology)    Systemic lupus erythematosus, unspecified SLE type, unspecified organ involvement status ([facility])          Subjective:      Patient ID: Celine Roa is a 48 y.o. female.    HPIPatient presents for f/up medical problems, most namely mast cell activation, inflammatory bowel disease, MS variant, hypothyroidism and Headaches. Per Dr. Levine ([facility]) , patient was given prednisone which has significantly helped her bowels. She was also referred to [facility] for Dr. Perez (GI motility) and will be going there. She is to continue her Skyrizi and is taking Methotrexate. Now with formed BM's (usually one per day). No vomiting. Occasional nausea. No BRBPR or melena. No F/S/C. Also sees Neurology at [place]; rheumatology, endocrinology, and cardiology.     The following portions of the patient's history were reviewed and updated as appropriate: allergies, current medications, past family history, past medical history, past social history, past surgical history, and problem list.    Review of Systems   Constitutional:  Positive for fatigue. Negative for activity change, appetite change, chills, diaphoresis and fever.        Cautious eating with fruits mostly   HENT:  Positive for congestion. Negative for sore throat and trouble swallowing.    Respiratory:  Negative for shortness of breath.    Cardiovascular:  Negative for  "chest pain and leg swelling.   Gastrointestinal:  Positive for abdominal pain and nausea. Negative for anal bleeding, blood in stool, constipation, diarrhea and vomiting.   Genitourinary:  Negative for vaginal bleeding.   Skin:  Negative for rash.   Neurological:  Negative for seizures, syncope and headaches.   Psychiatric/Behavioral:          Denies christopher depression but feels guilty about inactivity and affects on family         Objective:      /88 (BP Location: Left arm, Patient Position: Sitting, Cuff Size: Standard)   Pulse (!) 115   Temp 98.3 °F (36.8 °C)   Resp 18   Ht 5' 5.25\" (1.657 m)   Wt 72.6 kg (160 lb)   SpO2 99%   BMI 26.42 kg/m²          Physical Exam  Vitals reviewed.   Constitutional:       General: She is not in acute distress.     Appearance: Normal appearance.   HENT:      Head: Normocephalic and atraumatic.   Eyes:      General: No scleral icterus.     Conjunctiva/sclera: Conjunctivae normal.   Cardiovascular:      Rate and Rhythm: Regular rhythm. Tachycardia present.      Pulses: Normal pulses.      Heart sounds: Normal heart sounds.   Pulmonary:      Effort: Pulmonary effort is normal.      Breath sounds: Normal breath sounds.   Abdominal:      General: Bowel sounds are normal. There is no distension.      Palpations: Abdomen is soft. There is no mass.      Tenderness: There is no guarding or rebound.      Comments: Minimal RUQ tenderness   Musculoskeletal:      Cervical back: Normal range of motion.      Right lower leg: No edema.      Left lower leg: No edema.   Skin:     General: Skin is warm and dry.   Neurological:      General: No focal deficit present.      Mental Status: She is alert and oriented to person, place, and time.   Psychiatric:         Mood and Affect: Mood normal.         Behavior: Behavior normal.           "

## 2024-01-30 ENCOUNTER — APPOINTMENT (OUTPATIENT)
Dept: LAB | Facility: HOSPITAL | Age: 49
End: 2024-01-30
Payer: COMMERCIAL

## 2024-01-30 ENCOUNTER — HOSPITAL ENCOUNTER (OUTPATIENT)
Dept: INFUSION CENTER | Facility: HOSPITAL | Age: 49
Discharge: HOME | End: 2024-01-30
Attending: INTERNAL MEDICINE
Payer: COMMERCIAL

## 2024-01-30 VITALS
RESPIRATION RATE: 18 BRPM | OXYGEN SATURATION: 93 % | TEMPERATURE: 97.9 F | SYSTOLIC BLOOD PRESSURE: 98 MMHG | DIASTOLIC BLOOD PRESSURE: 68 MMHG | HEART RATE: 100 BPM

## 2024-01-30 DIAGNOSIS — E56.9 MULTIPLE VITAMIN DEFICIENCY DISEASE: ICD-10-CM

## 2024-01-30 DIAGNOSIS — R11.10 VOMITING AND DIARRHEA: Primary | ICD-10-CM

## 2024-01-30 DIAGNOSIS — E11.9 DIABETES MELLITUS WITHOUT COMPLICATION (HCC): ICD-10-CM

## 2024-01-30 DIAGNOSIS — E61.1 IRON DEFICIENCY: ICD-10-CM

## 2024-01-30 DIAGNOSIS — R19.7 VOMITING AND DIARRHEA: Primary | ICD-10-CM

## 2024-01-30 DIAGNOSIS — R63.0 ANOREXIA: ICD-10-CM

## 2024-01-30 DIAGNOSIS — E44.1 MILD PROTEIN-CALORIE MALNUTRITION (HCC): ICD-10-CM

## 2024-01-30 DIAGNOSIS — Z13.9 SCREENING FOR UNSPECIFIED CONDITION: ICD-10-CM

## 2024-01-30 DIAGNOSIS — K50.919 CROHN'S DISEASE WITH COMPLICATION, UNSPECIFIED GASTROINTESTINAL TRACT LOCATION (HCC): ICD-10-CM

## 2024-01-30 DIAGNOSIS — D50.0 IRON DEFICIENCY ANEMIA DUE TO CHRONIC BLOOD LOSS: ICD-10-CM

## 2024-01-30 LAB
CREAT UR-MCNC: 155.7 MG/DL
MICROALBUMIN UR-MCNC: 8.2 MG/L
MICROALBUMIN/CREAT 24H UR: 5 MG/G CREATININE (ref 0–30)

## 2024-01-30 PROCEDURE — 96361 HYDRATE IV INFUSION ADD-ON: CPT

## 2024-01-30 PROCEDURE — 82570 ASSAY OF URINE CREATININE: CPT

## 2024-01-30 PROCEDURE — 96375 TX/PRO/DX INJ NEW DRUG ADDON: CPT

## 2024-01-30 PROCEDURE — 82043 UR ALBUMIN QUANTITATIVE: CPT

## 2024-01-30 PROCEDURE — 96367 TX/PROPH/DG ADDL SEQ IV INF: CPT

## 2024-01-30 PROCEDURE — 96365 THER/PROPH/DIAG IV INF INIT: CPT

## 2024-01-30 RX ORDER — METHYLPREDNISOLONE SODIUM SUCCINATE 40 MG/ML
40 INJECTION, POWDER, LYOPHILIZED, FOR SOLUTION INTRAMUSCULAR; INTRAVENOUS ONCE
Status: COMPLETED | OUTPATIENT
Start: 2024-01-30 | End: 2024-01-30

## 2024-01-30 RX ORDER — METHYLPREDNISOLONE SODIUM SUCCINATE 40 MG/ML
40 INJECTION, POWDER, LYOPHILIZED, FOR SOLUTION INTRAMUSCULAR; INTRAVENOUS ONCE
Status: CANCELLED
Start: 2024-02-02 | End: 2024-01-31

## 2024-01-30 RX ADMIN — SODIUM CHLORIDE 1000 ML: 0.9 INJECTION, SOLUTION INTRAVENOUS at 13:47

## 2024-01-30 RX ADMIN — FAMOTIDINE 40 MG: 10 INJECTION INTRAVENOUS at 14:28

## 2024-01-30 RX ADMIN — DIPHENHYDRAMINE HYDROCHLORIDE 50 MG: 50 INJECTION, SOLUTION INTRAMUSCULAR; INTRAVENOUS at 13:50

## 2024-01-30 RX ADMIN — METHYLPREDNISOLONE SODIUM SUCCINATE: 40 INJECTION, POWDER, FOR SOLUTION INTRAMUSCULAR; INTRAVENOUS at 15:07

## 2024-01-30 NOTE — PROGRESS NOTES
Celine Roa  tolerated treatment well with no complications.      Celine Roa is aware of future appt on 2/2 at 1300.     AVS printed and given to Celine Roa:  No (Declined by Celine Roa) x

## 2024-01-31 ENCOUNTER — HOSPITAL ENCOUNTER (OUTPATIENT)
Dept: NON INVASIVE DIAGNOSTICS | Facility: HOSPITAL | Age: 49
Discharge: HOME | End: 2024-01-31
Attending: INTERNAL MEDICINE
Payer: COMMERCIAL

## 2024-01-31 VITALS
BODY MASS INDEX: 26.67 KG/M2 | HEART RATE: 83 BPM | SYSTOLIC BLOOD PRESSURE: 108 MMHG | WEIGHT: 160.05 LBS | HEIGHT: 65 IN | DIASTOLIC BLOOD PRESSURE: 62 MMHG

## 2024-01-31 DIAGNOSIS — I47.19 ATRIAL TACHYCARDIA: ICD-10-CM

## 2024-01-31 DIAGNOSIS — R00.2 HEART PALPITATIONS: ICD-10-CM

## 2024-01-31 DIAGNOSIS — I50.30 DIASTOLIC CONGESTIVE HEART FAILURE, UNSPECIFIED HF CHRONICITY (HCC): ICD-10-CM

## 2024-01-31 LAB
AORTIC ROOT: 3.3 CM
ASCENDING AORTA: 3.7 CM
BSA FOR ECHO PROCEDURE: 1.8 M2
E WAVE DECELERATION TIME: 222 MS
E/A RATIO: 0.82
FRACTIONAL SHORTENING: 35 (ref 28–44)
INTERVENTRICULAR SEPTUM IN DIASTOLE (PARASTERNAL SHORT AXIS VIEW): 0.8 CM
INTERVENTRICULAR SEPTUM: 0.8 CM (ref 0.6–1.1)
LAAS-AP2: 19.7 CM2
LAAS-AP4: 18.6 CM2
LEFT ATRIUM SIZE: 3.5 CM
LEFT ATRIUM VOLUME (MOD BIPLANE): 61 ML
LEFT ATRIUM VOLUME INDEX (MOD BIPLANE): 33.9 ML/M2
LEFT INTERNAL DIMENSION IN SYSTOLE: 3.2 CM (ref 2.1–4)
LEFT VENTRICULAR INTERNAL DIMENSION IN DIASTOLE: 4.9 CM (ref 3.5–6)
LEFT VENTRICULAR POSTERIOR WALL IN END DIASTOLE: 0.8 CM
LEFT VENTRICULAR STROKE VOLUME: 72 ML
LVSV (TEICH): 72 ML
MV E'TISSUE VEL-LAT: 13 CM/S
MV E'TISSUE VEL-SEP: 9 CM/S
MV PEAK A VEL: 0.74 M/S
MV PEAK E VEL: 61 CM/S
MV STENOSIS PRESSURE HALF TIME: 64 MS
MV VALVE AREA P 1/2 METHOD: 3.44
PULM VEIN S/D RATIO: 0.89
PV PEAK D VEL: 0.18 M/S
PV PEAK S VEL: 0.16 M/S
RIGHT ATRIUM AREA SYSTOLE A4C: 13.9 CM2
RIGHT VENTRICLE ID DIMENSION: 2.6 CM
SL CV LEFT ATRIUM LENGTH A2C: 5.2 CM
SL CV LV EF: 60
SL CV PED ECHO LEFT VENTRICLE DIASTOLIC VOLUME (MOD BIPLANE) 2D: 112 ML
SL CV PED ECHO LEFT VENTRICLE SYSTOLIC VOLUME (MOD BIPLANE) 2D: 40 ML
TR MAX PG: 20 MMHG
TR PEAK VELOCITY: 2.2 M/S
TRICUSPID ANNULAR PLANE SYSTOLIC EXCURSION: 2.2 CM
TRICUSPID VALVE PEAK E WAVE VELOCITY: 0.16 M/S
TRICUSPID VALVE PEAK REGURGITATION VELOCITY: 2.23 M/S

## 2024-01-31 PROCEDURE — 93306 TTE W/DOPPLER COMPLETE: CPT

## 2024-01-31 PROCEDURE — 93306 TTE W/DOPPLER COMPLETE: CPT | Performed by: INTERNAL MEDICINE

## 2024-01-31 RX ORDER — METHOTREXATE 25 MG/ML
25 INJECTION INTRA-ARTERIAL; INTRAMUSCULAR; INTRATHECAL; INTRAVENOUS ONCE
Status: COMPLETED | OUTPATIENT
Start: 2024-02-02 | End: 2024-02-02

## 2024-02-01 LAB — VIT A SERPL-MCNC: 64.2 UG/DL (ref 20.1–62)

## 2024-02-02 ENCOUNTER — TELEPHONE (OUTPATIENT)
Dept: BEHAVIORAL/MENTAL HEALTH CLINIC | Facility: CLINIC | Age: 49
End: 2024-02-02

## 2024-02-02 ENCOUNTER — TELEPHONE (OUTPATIENT)
Dept: PSYCHIATRY | Facility: CLINIC | Age: 49
End: 2024-02-02

## 2024-02-02 ENCOUNTER — HOSPITAL ENCOUNTER (OUTPATIENT)
Dept: INFUSION CENTER | Facility: HOSPITAL | Age: 49
End: 2024-02-02
Attending: INTERNAL MEDICINE
Payer: COMMERCIAL

## 2024-02-02 VITALS
DIASTOLIC BLOOD PRESSURE: 78 MMHG | TEMPERATURE: 97.9 F | RESPIRATION RATE: 16 BRPM | SYSTOLIC BLOOD PRESSURE: 119 MMHG | HEART RATE: 91 BPM

## 2024-02-02 DIAGNOSIS — R11.10 VOMITING AND DIARRHEA: Primary | ICD-10-CM

## 2024-02-02 DIAGNOSIS — R63.0 ANOREXIA: ICD-10-CM

## 2024-02-02 DIAGNOSIS — D50.0 IRON DEFICIENCY ANEMIA DUE TO CHRONIC BLOOD LOSS: ICD-10-CM

## 2024-02-02 DIAGNOSIS — E61.1 IRON DEFICIENCY: ICD-10-CM

## 2024-02-02 DIAGNOSIS — R19.7 VOMITING AND DIARRHEA: Primary | ICD-10-CM

## 2024-02-02 DIAGNOSIS — E44.1 MILD PROTEIN-CALORIE MALNUTRITION (HCC): ICD-10-CM

## 2024-02-02 PROCEDURE — 96401 CHEMO ANTI-NEOPL SQ/IM: CPT

## 2024-02-02 PROCEDURE — 96361 HYDRATE IV INFUSION ADD-ON: CPT

## 2024-02-02 PROCEDURE — 96375 TX/PRO/DX INJ NEW DRUG ADDON: CPT

## 2024-02-02 PROCEDURE — 96365 THER/PROPH/DIAG IV INF INIT: CPT

## 2024-02-02 RX ORDER — METHYLPREDNISOLONE SODIUM SUCCINATE 40 MG/ML
40 INJECTION, POWDER, LYOPHILIZED, FOR SOLUTION INTRAMUSCULAR; INTRAVENOUS ONCE
Status: CANCELLED
Start: 2024-02-02 | End: 2024-02-03

## 2024-02-02 RX ORDER — METHYLPREDNISOLONE SODIUM SUCCINATE 40 MG/ML
40 INJECTION, POWDER, LYOPHILIZED, FOR SOLUTION INTRAMUSCULAR; INTRAVENOUS ONCE
Status: COMPLETED | OUTPATIENT
Start: 2024-02-02 | End: 2024-02-02

## 2024-02-02 RX ADMIN — METHOTREXATE 25 MG: 25 SOLUTION INTRA-ARTERIAL; INTRAMUSCULAR; INTRATHECAL; INTRAVENOUS at 15:55

## 2024-02-02 RX ADMIN — SODIUM CHLORIDE 1000 ML: 0.9 INJECTION, SOLUTION INTRAVENOUS at 13:35

## 2024-02-02 RX ADMIN — METHYLPREDNISOLONE SODIUM SUCCINATE 40 MG: 40 INJECTION, POWDER, FOR SOLUTION INTRAMUSCULAR; INTRAVENOUS at 14:26

## 2024-02-02 RX ADMIN — DIPHENHYDRAMINE HYDROCHLORIDE 50 MG: 50 INJECTION, SOLUTION INTRAMUSCULAR; INTRAVENOUS at 13:31

## 2024-02-02 RX ADMIN — FAMOTIDINE 40 MG: 10 INJECTION INTRAVENOUS at 13:54

## 2024-02-02 NOTE — TELEPHONE ENCOUNTER
The patient contacted the office, requesting to see a new therapist. The patient would prefer to see a provider in the office. The writer informed the patient that she would have to go on the wait list for a new provider. The writer advised the patient to continue care with her current provider until we can get her established with her new provider. The patient verbalized understanding. The patient was placed on the wait list for Giuseppe.    Preferences  Letty Preferred  No Provider Preference  In Office Preferred

## 2024-02-02 NOTE — PROGRESS NOTES
Celine Roa tolerated hydration treatment well with no complications. Methotrexate administered to left abdomen.    Celine Roa is aware of future appt on 2/5/24 at 1230.     AVS declined, patient has MyChart.

## 2024-02-05 ENCOUNTER — TELEPHONE (OUTPATIENT)
Dept: GYNECOLOGY | Facility: CLINIC | Age: 49
End: 2024-02-05

## 2024-02-05 ENCOUNTER — HOSPITAL ENCOUNTER (OUTPATIENT)
Dept: INFUSION CENTER | Facility: HOSPITAL | Age: 49
Discharge: HOME | End: 2024-02-05
Payer: COMMERCIAL

## 2024-02-05 ENCOUNTER — TELEPHONE (OUTPATIENT)
Dept: PSYCHIATRY | Facility: CLINIC | Age: 49
End: 2024-02-05

## 2024-02-05 VITALS
OXYGEN SATURATION: 95 % | SYSTOLIC BLOOD PRESSURE: 122 MMHG | DIASTOLIC BLOOD PRESSURE: 81 MMHG | HEART RATE: 130 BPM | RESPIRATION RATE: 18 BRPM | TEMPERATURE: 99.2 F

## 2024-02-05 DIAGNOSIS — R11.10 VOMITING AND DIARRHEA: Primary | ICD-10-CM

## 2024-02-05 DIAGNOSIS — D50.0 IRON DEFICIENCY ANEMIA DUE TO CHRONIC BLOOD LOSS: ICD-10-CM

## 2024-02-05 DIAGNOSIS — E61.1 IRON DEFICIENCY: ICD-10-CM

## 2024-02-05 DIAGNOSIS — E44.1 MILD PROTEIN-CALORIE MALNUTRITION (HCC): ICD-10-CM

## 2024-02-05 DIAGNOSIS — R19.7 VOMITING AND DIARRHEA: Primary | ICD-10-CM

## 2024-02-05 DIAGNOSIS — R63.0 ANOREXIA: ICD-10-CM

## 2024-02-05 PROCEDURE — 96361 HYDRATE IV INFUSION ADD-ON: CPT

## 2024-02-05 PROCEDURE — 96375 TX/PRO/DX INJ NEW DRUG ADDON: CPT

## 2024-02-05 PROCEDURE — 96365 THER/PROPH/DIAG IV INF INIT: CPT

## 2024-02-05 RX ORDER — METHYLPREDNISOLONE SODIUM SUCCINATE 40 MG/ML
40 INJECTION, POWDER, LYOPHILIZED, FOR SOLUTION INTRAMUSCULAR; INTRAVENOUS ONCE
Status: COMPLETED | OUTPATIENT
Start: 2024-02-05 | End: 2024-02-05

## 2024-02-05 RX ORDER — METHYLPREDNISOLONE SODIUM SUCCINATE 40 MG/ML
40 INJECTION, POWDER, LYOPHILIZED, FOR SOLUTION INTRAMUSCULAR; INTRAVENOUS ONCE
Start: 2024-02-09 | End: 2024-02-06

## 2024-02-05 RX ADMIN — FAMOTIDINE 40 MG: 10 INJECTION INTRAVENOUS at 14:43

## 2024-02-05 RX ADMIN — METHYLPREDNISOLONE SODIUM SUCCINATE 40 MG: 40 INJECTION, POWDER, FOR SOLUTION INTRAMUSCULAR; INTRAVENOUS at 15:14

## 2024-02-05 RX ADMIN — DIPHENHYDRAMINE HYDROCHLORIDE 50 MG: 50 INJECTION, SOLUTION INTRAMUSCULAR; INTRAVENOUS at 13:05

## 2024-02-05 RX ADMIN — SODIUM CHLORIDE 1000 ML: 0.9 INJECTION, SOLUTION INTRAVENOUS at 13:05

## 2024-02-05 NOTE — PROGRESS NOTES
Celine Roa  tolerated treatment well with no complications.      Celine Roa is aware of future appt on 2/9/24 at 1000.     AVS printed and given to Celine Roa:   No (Declined by Celine Roa) x

## 2024-02-05 NOTE — PROGRESS NOTES
1310 IV infiltrated in right forearm and removed. 1325 New IV in left forearm and infusion continued.

## 2024-02-05 NOTE — TELEPHONE ENCOUNTER
Spoke to pt and reviewed ultrasound findings which revealed B/L ovarian cysts. Of note, u/s ordered by a different provider. Pt has not been seen in our office since 2022. Pt strongly desires to have ovaries removed. Advised that pain may persist and if ovarian function continues, she may experience menopausal sx.   Message sent to staff to set up consultation with Dr. Benito.

## 2024-02-07 ENCOUNTER — APPOINTMENT (EMERGENCY)
Dept: CT IMAGING | Facility: HOSPITAL | Age: 49
DRG: 445 | End: 2024-02-07
Payer: COMMERCIAL

## 2024-02-07 ENCOUNTER — TELEPHONE (OUTPATIENT)
Age: 49
End: 2024-02-07

## 2024-02-07 ENCOUNTER — TELEPHONE (OUTPATIENT)
Dept: GASTROENTEROLOGY | Facility: CLINIC | Age: 49
End: 2024-02-07

## 2024-02-07 ENCOUNTER — NURSE TRIAGE (OUTPATIENT)
Age: 49
End: 2024-02-07

## 2024-02-07 ENCOUNTER — HOSPITAL ENCOUNTER (INPATIENT)
Facility: HOSPITAL | Age: 49
LOS: 4 days | DRG: 445 | End: 2024-02-13
Attending: EMERGENCY MEDICINE | Admitting: FAMILY MEDICINE
Payer: COMMERCIAL

## 2024-02-07 DIAGNOSIS — R10.9 ABDOMINAL PAIN: Primary | ICD-10-CM

## 2024-02-07 DIAGNOSIS — K50.119 CROHN'S DISEASE OF COLON WITH COMPLICATION (HCC): Primary | ICD-10-CM

## 2024-02-07 DIAGNOSIS — N32.89 BLADDER SPASM: ICD-10-CM

## 2024-02-07 DIAGNOSIS — R10.11 INTRACTABLE RIGHT UPPER QUADRANT ABDOMINAL PAIN: ICD-10-CM

## 2024-02-07 DIAGNOSIS — R10.84 GENERALIZED ABDOMINAL PAIN: ICD-10-CM

## 2024-02-07 DIAGNOSIS — R11.0 NAUSEA: Primary | ICD-10-CM

## 2024-02-07 DIAGNOSIS — R11.0 NAUSEA: ICD-10-CM

## 2024-02-07 DIAGNOSIS — D72.829 LEUKOCYTOSIS: ICD-10-CM

## 2024-02-07 DIAGNOSIS — R34 DECREASED URINE OUTPUT: ICD-10-CM

## 2024-02-07 LAB
ALBUMIN SERPL BCP-MCNC: 4.3 G/DL (ref 3.5–5)
ALP SERPL-CCNC: 61 U/L (ref 34–104)
ALT SERPL W P-5'-P-CCNC: 29 U/L (ref 7–52)
ANION GAP SERPL CALCULATED.3IONS-SCNC: 8 MMOL/L
APTT PPP: 24 SECONDS (ref 23–37)
AST SERPL W P-5'-P-CCNC: 16 U/L (ref 13–39)
BASOPHILS # BLD AUTO: 0.15 THOUSANDS/ÂΜL (ref 0–0.1)
BASOPHILS NFR BLD AUTO: 1 % (ref 0–1)
BILIRUB SERPL-MCNC: 0.4 MG/DL (ref 0.2–1)
BUN SERPL-MCNC: 15 MG/DL (ref 5–25)
CALCIUM SERPL-MCNC: 9.2 MG/DL (ref 8.4–10.2)
CARDIAC TROPONIN I PNL SERPL HS: <2 NG/L
CHLORIDE SERPL-SCNC: 102 MMOL/L (ref 96–108)
CO2 SERPL-SCNC: 27 MMOL/L (ref 21–32)
CREAT SERPL-MCNC: 0.84 MG/DL (ref 0.6–1.3)
CRP SERPL QL: 4.3 MG/L
EOSINOPHIL # BLD AUTO: 0.41 THOUSAND/ÂΜL (ref 0–0.61)
EOSINOPHIL NFR BLD AUTO: 4 % (ref 0–6)
ERYTHROCYTE [DISTWIDTH] IN BLOOD BY AUTOMATED COUNT: 14.7 % (ref 11.6–15.1)
FLUAV RNA RESP QL NAA+PROBE: NEGATIVE
FLUBV RNA RESP QL NAA+PROBE: NEGATIVE
GFR SERPL CREATININE-BSD FRML MDRD: 81 ML/MIN/1.73SQ M
GLUCOSE SERPL-MCNC: 80 MG/DL (ref 65–140)
HCT VFR BLD AUTO: 47.7 % (ref 34.8–46.1)
HGB BLD-MCNC: 15.6 G/DL (ref 11.5–15.4)
IMM GRANULOCYTES # BLD AUTO: 0.06 THOUSAND/UL (ref 0–0.2)
IMM GRANULOCYTES NFR BLD AUTO: 1 % (ref 0–2)
INR PPP: 1 (ref 0.84–1.19)
LACTATE SERPL-SCNC: 1.3 MMOL/L (ref 0.5–2)
LIPASE SERPL-CCNC: 21 U/L (ref 11–82)
LYMPHOCYTES # BLD AUTO: 2.5 THOUSANDS/ÂΜL (ref 0.6–4.47)
LYMPHOCYTES NFR BLD AUTO: 22 % (ref 14–44)
MAGNESIUM SERPL-MCNC: 2.3 MG/DL (ref 1.9–2.7)
MCH RBC QN AUTO: 32.3 PG (ref 26.8–34.3)
MCHC RBC AUTO-ENTMCNC: 32.7 G/DL (ref 31.4–37.4)
MCV RBC AUTO: 99 FL (ref 82–98)
MONOCYTES # BLD AUTO: 0.79 THOUSAND/ÂΜL (ref 0.17–1.22)
MONOCYTES NFR BLD AUTO: 7 % (ref 4–12)
NEUTROPHILS # BLD AUTO: 7.58 THOUSANDS/ÂΜL (ref 1.85–7.62)
NEUTS SEG NFR BLD AUTO: 65 % (ref 43–75)
NRBC BLD AUTO-RTO: 0 /100 WBCS
PLATELET # BLD AUTO: 464 THOUSANDS/UL (ref 149–390)
PMV BLD AUTO: 8.7 FL (ref 8.9–12.7)
POTASSIUM SERPL-SCNC: 4.2 MMOL/L (ref 3.5–5.3)
PROT SERPL-MCNC: 7.1 G/DL (ref 6.4–8.4)
PROTHROMBIN TIME: 13.1 SECONDS (ref 11.6–14.5)
RBC # BLD AUTO: 4.83 MILLION/UL (ref 3.81–5.12)
RSV RNA RESP QL NAA+PROBE: NEGATIVE
SARS-COV-2 RNA RESP QL NAA+PROBE: NEGATIVE
SODIUM SERPL-SCNC: 137 MMOL/L (ref 135–147)
WBC # BLD AUTO: 11.49 THOUSAND/UL (ref 4.31–10.16)

## 2024-02-07 PROCEDURE — 71250 CT THORAX DX C-: CPT

## 2024-02-07 PROCEDURE — 84484 ASSAY OF TROPONIN QUANT: CPT | Performed by: PHYSICIAN ASSISTANT

## 2024-02-07 PROCEDURE — 96374 THER/PROPH/DIAG INJ IV PUSH: CPT

## 2024-02-07 PROCEDURE — 99222 1ST HOSP IP/OBS MODERATE 55: CPT | Performed by: PHYSICIAN ASSISTANT

## 2024-02-07 PROCEDURE — 85610 PROTHROMBIN TIME: CPT | Performed by: PHYSICIAN ASSISTANT

## 2024-02-07 PROCEDURE — 83605 ASSAY OF LACTIC ACID: CPT | Performed by: PHYSICIAN ASSISTANT

## 2024-02-07 PROCEDURE — 83690 ASSAY OF LIPASE: CPT | Performed by: PHYSICIAN ASSISTANT

## 2024-02-07 PROCEDURE — 96375 TX/PRO/DX INJ NEW DRUG ADDON: CPT

## 2024-02-07 PROCEDURE — G1004 CDSM NDSC: HCPCS

## 2024-02-07 PROCEDURE — 80053 COMPREHEN METABOLIC PANEL: CPT | Performed by: PHYSICIAN ASSISTANT

## 2024-02-07 PROCEDURE — 36415 COLL VENOUS BLD VENIPUNCTURE: CPT | Performed by: PHYSICIAN ASSISTANT

## 2024-02-07 PROCEDURE — 74176 CT ABD & PELVIS W/O CONTRAST: CPT

## 2024-02-07 PROCEDURE — 85025 COMPLETE CBC W/AUTO DIFF WBC: CPT | Performed by: PHYSICIAN ASSISTANT

## 2024-02-07 PROCEDURE — 93005 ELECTROCARDIOGRAM TRACING: CPT

## 2024-02-07 PROCEDURE — 86140 C-REACTIVE PROTEIN: CPT | Performed by: PHYSICIAN ASSISTANT

## 2024-02-07 PROCEDURE — 96361 HYDRATE IV INFUSION ADD-ON: CPT

## 2024-02-07 PROCEDURE — 85730 THROMBOPLASTIN TIME PARTIAL: CPT | Performed by: PHYSICIAN ASSISTANT

## 2024-02-07 PROCEDURE — 99285 EMERGENCY DEPT VISIT HI MDM: CPT | Performed by: PHYSICIAN ASSISTANT

## 2024-02-07 PROCEDURE — 0241U HB NFCT DS VIR RESP RNA 4 TRGT: CPT | Performed by: PHYSICIAN ASSISTANT

## 2024-02-07 PROCEDURE — 99284 EMERGENCY DEPT VISIT MOD MDM: CPT

## 2024-02-07 PROCEDURE — 83735 ASSAY OF MAGNESIUM: CPT | Performed by: PHYSICIAN ASSISTANT

## 2024-02-07 PROCEDURE — 96376 TX/PRO/DX INJ SAME DRUG ADON: CPT

## 2024-02-07 RX ORDER — LIOTHYRONINE SODIUM 5 UG/1
5 TABLET ORAL DAILY
Status: DISCONTINUED | OUTPATIENT
Start: 2024-02-08 | End: 2024-02-13 | Stop reason: HOSPADM

## 2024-02-07 RX ORDER — HYDROMORPHONE HCL/PF 1 MG/ML
0.5 SYRINGE (ML) INJECTION ONCE
Status: COMPLETED | OUTPATIENT
Start: 2024-02-07 | End: 2024-02-07

## 2024-02-07 RX ORDER — ONDANSETRON 2 MG/ML
4 INJECTION INTRAMUSCULAR; INTRAVENOUS EVERY 6 HOURS PRN
Status: DISCONTINUED | OUTPATIENT
Start: 2024-02-07 | End: 2024-02-13 | Stop reason: HOSPADM

## 2024-02-07 RX ORDER — ONDANSETRON 2 MG/ML
4 INJECTION INTRAMUSCULAR; INTRAVENOUS ONCE
Status: COMPLETED | OUTPATIENT
Start: 2024-02-07 | End: 2024-02-07

## 2024-02-07 RX ORDER — PREDNISONE 10 MG/1
10 TABLET ORAL DAILY
Status: DISCONTINUED | OUTPATIENT
Start: 2024-02-08 | End: 2024-02-11

## 2024-02-07 RX ORDER — DIPHENHYDRAMINE HCL 50 MG
50 CAPSULE ORAL 4 TIMES DAILY PRN
Status: DISCONTINUED | OUTPATIENT
Start: 2024-02-07 | End: 2024-02-08

## 2024-02-07 RX ORDER — LORATADINE 10 MG/1
10 TABLET ORAL DAILY
Status: DISCONTINUED | OUTPATIENT
Start: 2024-02-08 | End: 2024-02-13 | Stop reason: HOSPADM

## 2024-02-07 RX ORDER — SCOLOPAMINE TRANSDERMAL SYSTEM 1 MG/1
1 PATCH, EXTENDED RELEASE TRANSDERMAL
Qty: 13 PATCH | Refills: 3 | Status: ON HOLD | OUTPATIENT
Start: 2024-02-07

## 2024-02-07 RX ORDER — MIRTAZAPINE 15 MG/1
15 TABLET, FILM COATED ORAL
Status: DISCONTINUED | OUTPATIENT
Start: 2024-02-07 | End: 2024-02-13 | Stop reason: HOSPADM

## 2024-02-07 RX ORDER — DIPHENHYDRAMINE HCL 50 MG
50 CAPSULE ORAL 4 TIMES DAILY PRN
Status: ON HOLD | COMMUNITY

## 2024-02-07 RX ORDER — ASCORBIC ACID 500 MG
1000 TABLET ORAL 2 TIMES DAILY
Status: DISCONTINUED | OUTPATIENT
Start: 2024-02-07 | End: 2024-02-13 | Stop reason: HOSPADM

## 2024-02-07 RX ORDER — MELATONIN
1000 2 TIMES DAILY
Status: DISCONTINUED | OUTPATIENT
Start: 2024-02-07 | End: 2024-02-13 | Stop reason: HOSPADM

## 2024-02-07 RX ORDER — NIACIN 100 MG
100 TABLET ORAL 2 TIMES DAILY WITH MEALS
Status: DISCONTINUED | OUTPATIENT
Start: 2024-02-08 | End: 2024-02-13 | Stop reason: HOSPADM

## 2024-02-07 RX ORDER — MELATONIN 200 MCG
6 TABLET ORAL
Status: DISCONTINUED | OUTPATIENT
Start: 2024-02-07 | End: 2024-02-07

## 2024-02-07 RX ORDER — FAMOTIDINE 20 MG/1
40 TABLET, FILM COATED ORAL DAILY
Status: DISCONTINUED | OUTPATIENT
Start: 2024-02-08 | End: 2024-02-11

## 2024-02-07 RX ORDER — DIPHENHYDRAMINE HYDROCHLORIDE 50 MG/ML
25 INJECTION INTRAMUSCULAR; INTRAVENOUS ONCE
Status: COMPLETED | OUTPATIENT
Start: 2024-02-07 | End: 2024-02-07

## 2024-02-07 RX ORDER — LACTOBACILLUS RHAMNOSUS GG 10B CELL
CAPSULE ORAL DAILY
Status: DISCONTINUED | OUTPATIENT
Start: 2024-02-08 | End: 2024-02-07

## 2024-02-07 RX ORDER — FOLIC ACID 1 MG/1
1 TABLET ORAL DAILY
Status: DISCONTINUED | OUTPATIENT
Start: 2024-02-08 | End: 2024-02-13 | Stop reason: HOSPADM

## 2024-02-07 RX ORDER — LEVOTHYROXINE SODIUM 0.07 MG/1
75 TABLET ORAL EVERY OTHER DAY
Status: DISCONTINUED | OUTPATIENT
Start: 2024-02-08 | End: 2024-02-13 | Stop reason: HOSPADM

## 2024-02-07 RX ORDER — MONTELUKAST SODIUM 10 MG/1
10 TABLET ORAL
Status: DISCONTINUED | OUTPATIENT
Start: 2024-02-07 | End: 2024-02-13 | Stop reason: HOSPADM

## 2024-02-07 RX ORDER — SODIUM CHLORIDE 9 MG/ML
75 INJECTION, SOLUTION INTRAVENOUS CONTINUOUS
Status: DISCONTINUED | OUTPATIENT
Start: 2024-02-07 | End: 2024-02-13 | Stop reason: HOSPADM

## 2024-02-07 RX ORDER — MULTIVIT,TX WITH IRON,MINERALS
500 TABLET, EXTENDED RELEASE ORAL 2 TIMES DAILY
Status: DISCONTINUED | OUTPATIENT
Start: 2024-02-07 | End: 2024-02-13 | Stop reason: HOSPADM

## 2024-02-07 RX ORDER — HYDROXYZINE HYDROCHLORIDE 10 MG/1
15 TABLET, FILM COATED ORAL DAILY
Status: DISCONTINUED | OUTPATIENT
Start: 2024-02-08 | End: 2024-02-13 | Stop reason: HOSPADM

## 2024-02-07 RX ADMIN — SODIUM CHLORIDE 1000 ML: 0.9 INJECTION, SOLUTION INTRAVENOUS at 19:07

## 2024-02-07 RX ADMIN — HYDROMORPHONE HYDROCHLORIDE 0.5 MG: 1 INJECTION, SOLUTION INTRAMUSCULAR; INTRAVENOUS; SUBCUTANEOUS at 21:35

## 2024-02-07 RX ADMIN — SODIUM CHLORIDE 75 ML/HR: 0.9 INJECTION, SOLUTION INTRAVENOUS at 22:17

## 2024-02-07 RX ADMIN — SODIUM CHLORIDE 1000 ML: 0.9 INJECTION, SOLUTION INTRAVENOUS at 17:28

## 2024-02-07 RX ADMIN — DIPHENHYDRAMINE HYDROCHLORIDE 25 MG: 50 INJECTION, SOLUTION INTRAMUSCULAR; INTRAVENOUS at 23:36

## 2024-02-07 RX ADMIN — ONDANSETRON 4 MG: 2 INJECTION INTRAMUSCULAR; INTRAVENOUS at 17:30

## 2024-02-07 RX ADMIN — HYDROMORPHONE HYDROCHLORIDE 0.5 MG: 1 INJECTION, SOLUTION INTRAMUSCULAR; INTRAVENOUS; SUBCUTANEOUS at 17:31

## 2024-02-07 RX ADMIN — ONDANSETRON 4 MG: 2 INJECTION INTRAMUSCULAR; INTRAVENOUS at 19:55

## 2024-02-07 RX ADMIN — HYDROMORPHONE HYDROCHLORIDE 0.5 MG: 1 INJECTION, SOLUTION INTRAMUSCULAR; INTRAVENOUS; SUBCUTANEOUS at 18:53

## 2024-02-07 NOTE — TELEPHONE ENCOUNTER
Hi,    Can we please set her up for an appointment with Dr. Soto for possible biliary stricture?    Thank you

## 2024-02-07 NOTE — TELEPHONE ENCOUNTER
"  Last OV:1/17/24   Last Colonoscopy:5/2/23  The terminal ileum, ascending colon and transverse colon appeared normal. Performed random biopsy using biopsy forceps.  Mild, patchy edematous, erythematous and granular mucosa in the descending colon, sigmoid colon and rectum; performed cold forceps biopsy  The examination was otherwise normal on direct and retroflexion views.     Last EGD:9/20/23  The esophagus appeared normal. Performed random biopsy to rule out eosinophilic esophagitis.  The stomach appeared normal. Performed random biopsy to rule out H. pylori.  The duodenum appeared normal. Performed random biopsy.       Labs:  Imaging:US abdomen 1/29/24  1. Dilated CBD measuring 12 mm slightly decreased from prior could be related to postcholecystectomy changes.     2. Mild hepatomegaly.    Next OV:4/11/24    Pt. C/o RUQ pain 9/10, unrelieved with tylenol or ice, difficulty with eating and makes it hard for her to take deep breathes due ti pain, pt. Wants to speak to someone in the office, offered an appointment or ER and she refused, please advise             Reason for Disposition   Patient sounds very sick or weak to the triager    Answer Assessment - Initial Assessment Questions  1. LOCATION: \"Where does it hurt?\"       Right upper quadrant pain   2. RADIATION: \"Does the pain shoot anywhere else?\" (e.g., chest, back)      Denies   3. ONSET: \"When did the pain begin?\" (e.g., minutes, hours or days ago)       Ongoing, pain got worse the past month   4. SUDDEN: \"Gradual or sudden onset?\"      Sudden   5. PATTERN \"Does the pain come and go, or is it constant?\"     - If constant: \"Is it getting better, staying the same, or worsening?\"       (Note: Constant means the pain never goes away completely; most serious pain is constant and it progresses)      - If intermittent: \"How long does it last?\" \"Do you have pain now?\"      (Note: Intermittent means the pain goes away completely between bouts)      " "Constant,increases in severity   6. SEVERITY: \"How bad is the pain?\"  (e.g., Scale 1-10; mild, moderate, or severe)    - MILD (1-3): doesn't interfere with normal activities, abdomen soft and not tender to touch     - MODERATE (4-7): interferes with normal activities or awakens from sleep, tender to touch     - SEVERE (8-10): excruciating pain, doubled over, unable to do any normal activities       9/10  7. RECURRENT SYMPTOM: \"Have you ever had this type of stomach pain before?\" If Yes, ask: \"When was the last time?\" and \"What happened that time?\"       Pt. Last time she had this pain she was in the ER   8. CAUSE: \"What do you think is causing the stomach pain?\"      Unsure   9. RELIEVING/AGGRAVATING FACTORS: \"What makes it better or worse?\" (e.g., movement, antacids, bowel movement)      Not eating and sleeping makes it worse, pt. Taking tylenol and makes it worse   10. OTHER SYMPTOMS: \"Has there been any vomiting, diarrhea, constipation, or urine problems?\"        Nausea   11. PREGNANCY: \"Is there any chance you are pregnant?\" \"When was your last menstrual period?\"        Denies    Protocols used: Abdominal Pain - Female-ADULT-[place]    "

## 2024-02-07 NOTE — ED PROVIDER NOTES
History  Chief Complaint   Patient presents with    Abdominal Pain     Patient has had worsening right upper abd pain since Saturday. Patient states she's very bloated and it got worse after she ate today.      48 year old female with PMH Crohn's disease, h/o pancreatitis, sphincter of oddi dysfunction, GERD, IBS, anxiety, RA, mast cell activation presenting from home for evaluation of abdominal pain.  Pt reports onset of symptoms last Saturday.  She reports she has been having a bad pain in her RUQ of her abdomen.  Does not radiate.  Described as severe.  Reports associated nausea.  Today has been dry heaving.  She notes she had fever on Monday.  Denies cough, congestion or recent illness.  Denies chest pain, SOB.  She notes alternating diarrhea and constipation which has been usual for her given her past history of inflammatory bowel disease.  She notes diminished appetite.  She indicates she tried eating rice krispy treat and banana today and felt worse.  She c/o abdomen feeling bloated.  She notes having urinary frequency and discomfort with urination but she contributes this to her history of ovarian cysts.  No reported aggravating or alleviating factors.  She has taken OTC beano and her other home prescribed meds without relief.  She reports she gets regular migraines as well, typically takes excedrin for this.  She has had prior cholecystectomy, appendectomy, hysterectomy.  She indicates she was in contact with her GI who advised ER evaluation.  She indicates she was supposed to get CT chest to follow up this week and states her GI doctor also wanted abdominal imaging.      History provided by:  Patient and medical records   used: No    Abdominal Pain  Pain location:  RUQ  Pain quality: sharp and stabbing    Pain radiates to:  Does not radiate  Context: previous surgery    Context: not recent illness, not recent travel, not sick contacts, not suspicious food intake and not trauma   "  Relieved by:  Nothing  Worsened by:  Eating  Associated symptoms: constipation, diarrhea, fatigue and nausea    Associated symptoms: no chest pain, no chills, no cough, no dysuria, no fever, no hematuria, no shortness of breath, no sore throat and no vomiting        Prior to Admission Medications   Prescriptions Last Dose Informant Patient Reported? Taking?   Ascorbic Acid (vitamin C) 1000 MG tablet   Yes No   Sig: Take 1,000 mg by mouth 2 (two) times a day   B-D 3CC LUER-SHAMEAK SYR 25GX1\" 25G X 1\" 3 ML MISC  Self Yes No   Cholecalciferol 10 MCG /0.025ML LIQD  Self No No   Sig: Take 1,000 Units by mouth 2 (two) times a day   Patient not taking: Reported on 1/29/2024   EpiPen 2-Malachi 0.3 MG/0.3ML SOAJ  Self Yes No   Sig: INJECT 1 PEN INTO THE MUSCLE AS NEEDED FOR ANAPHALAXIS   Patient not taking: Reported on 1/29/2024   Lactobacillus (PROBIOTIC ACIDOPHILUS PO)  Self Yes No   Sig: Take by mouth   Lactobacillus-Inulin ([facility]) CAPS  Self Yes No   Sig: Take 200 mg by mouth daily   Melatonin 3 MG SUBL  Self Yes No   NEEDLE, DISP, 27 G (B-D DISP NEEDLE 05DV4-3/4\") 27G X 1-1/4\" MISC   No No   Sig: Use once a week   NON FORMULARY  Self Yes No   Sig: immunoglobin sq 6g 30 ml once a week   Riboflavin (VITAMIN B-2 PO)   Yes No   Sig: Take by mouth   Riboflavin (Vitamin B-2) 100 MG TABS   Yes No   Sig: Take 100 mg by mouth 2 (two) times a day   TechLite Lancets MISC  Self Yes No   butalbital-acetaminophen-caffeine (FIORICET,ESGIC) -40 mg per tablet  Self No No   Sig: Take 1 tablet by mouth every 8 (eight) hours as needed for migraine   Patient not taking: Reported on 1/29/2024   cetirizine (ZyrTEC) 10 mg tablet  Self Yes No   Sig: Take 20 mg by mouth daily   cholecalciferol (VITAMIN D3) 1,000 units tablet   Yes No   Sig: Take 1,000 Units by mouth 2 (two) times a day   cyanocobalamin (VITAMIN B-12) 100 mcg tablet   Yes No   Sig: Take by mouth daily   diclofenac (VOLTAREN) 75 mg EC tablet  Self Yes No "   Patient not taking: Reported on 1/29/2024   famotidine (PEPCID) 40 MG tablet   No No   Sig: TAKE ONE TABLET BY MOUTH ONCE DAILY   folic acid (KP Folic Acid) 1 mg tablet   No No   Sig: Take 1 tablet (1 mg total) by mouth daily   hydrOXYzine HCL (ATARAX) 25 mg tablet  Self Yes No   levalbuterol (XOPENEX HFA) 45 mcg/act inhaler  Self No No   Sig: Inhale 1-2 puffs every 4 (four) hours as needed for shortness of breath   levothyroxine 100 mcg tablet  Self Yes No   Sig: Take 100 mcg by mouth every other day   liothyronine (CYTOMEL) 5 mcg tablet  Self No No   Sig: Take 1 tablet (5 mcg total) by mouth daily Do not start before June 14, 2023.   magnesium gluconate (MAGONATE) 500 mg tablet   Yes No   Sig: Take 500 mg by mouth 2 (two) times a day   melatonin 3 mg  Self No No   Sig: Take 1 tablet (3 mg total) by mouth daily at bedtime   Patient not taking: Reported on 1/29/2024   methotrexate 50 MG/2ML injection   No No   Sig: Inject 1 mL (25 mg total) under the skin once a week   Patient not taking: Reported on 1/29/2024   methotrexate 50 MG/2ML injection   No No   Sig: Inject 1 mL (25 mg total) under the skin once a week   mirtazapine (REMERON) 15 mg tablet   No No   Sig: Take 1 tablet (15 mg total) by mouth daily at bedtime   montelukast (SINGULAIR) 10 mg tablet  Self No No   Sig: Take 1 tablet (10 mg total) by mouth daily at bedtime   nystatin (MYCOSTATIN) 500,000 units/5 mL suspension  Self No No   Sig: Swish and spit 5 mL (500,000 Units total) 4 (four) times a day   Patient not taking: Reported on 9/13/2023   omalizumab (XOLAIR) 150 mg   No No   Sig: Inject 2.4 mL (300 mg total) under the skin every 21 days   pantoprazole (PROTONIX) 40 mg tablet  Self No No   Sig: TAKE ONE TABLET BY MOUTH TWICE A DAY   Patient not taking: Reported on 11/27/2023   predniSONE 20 mg tablet   No No   Sig: Take 40mg per day for 1 week, then 30mg per day for 1 week, then 20mg per day for 1 week and then 10mg per day for 1 week.    risankizumab-rzaa (Skyrizi) 360 MG/2.4ML SOCT   No No   Sig: Take first on body injector (OBI) under skin on week 12 then every 8 weeks there after   scopolamine (TRANSDERM-SCOP) 1 mg/3 days TD 72 hr patch   No No   Sig: Place 1 patch on the skin over 72 hours every third day   traMADol (Ultram) 50 mg tablet  Self No No   Sig: Take 1 tablet (50 mg total) by mouth every 12 (twelve) hours as needed for moderate pain or severe pain   Patient not taking: Reported on 9/14/2023      Facility-Administered Medications: None       Past Medical History:   Diagnosis Date    Anemia 2007    Anxiety     Asthma     Bile duct stricture 2014    Sphincter of oddi dysfunction    Chronic kidney disease     Colon polyp 1998    Crohn's colitis (HCC)     Deep vein thrombosis (HCC)     Disease of thyroid gland     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 pt denies, documented in cardio note    Pancreatitis     sphinger of     Psychiatric disorder     RA (rheumatoid arthritis) (HCC)     Seizures (HCC)     Ulcerative colitis (HCC)     Visual impairment        Past Surgical History:   Procedure Laterality Date    ABDOMINAL SURGERY  2017    APPENDECTOMY      CHOLECYSTECTOMY      COLONOSCOPY  2019    CYSTOSCOPY N/A 6/8/2023    Procedure: CYSTOSCOPY, mini TURBT with fulgeration;  Surgeon: Tee Bruno MD;  Location: [facility];  Service: Urology    EGD AND COLONOSCOPY N/A 09/12/2017    Procedure: EGD AND COLONOSCOPY;  Surgeon: Tommy Oliver MD;  Location:  GI LAB;  Service: Gastroenterology    ERCP N/A 09/15/2017    Procedure: ENDOSCOPIC RETROGRADE CHOLANGIOPANCREATOGRAPHY (ERCP);  Surgeon: Dre Soto MD;  Location:  GI  LAB;  Service: Gastroenterology    HYSTERECTOMY      KNEE SURGERY Right     LAPAROSCOPIC ENDOMETRIOSIS FULGURATION      ORIF ANKLE FRACTURE Left     NV ARTHRS KNE SURG W/MENISCECTOMY MED/LAT W/SHVG Left 05/12/2017    Procedure: POSSIBLE MEDIAL MENISECTOMY;  Surgeon: Kristian Avila MD;  Location: [facility];  Service: Orthopedics    NV ARTHRS KNEE DEBRIDEMENT/SHAVING ARTCLR CRTLG Left 05/12/2017    Procedure: KNEE ARTHROSCOPY EVALUATION, CHONDROPLASTY;  Surgeon: Kristian Avila MD;  Location: [facility];  Service: Orthopedics    NV LAPS ABD PRTM&OMENTUM DX W/WO SPEC BR/WA SPX N/A 12/12/2017    Procedure: LAPAROSCOPY DIAGNOSTIC  WITH LYSIS OF ADHESIONS;  Surgeon: Olaf John DO;  Location:  MAIN OR;  Service: General    UPPER GASTROINTESTINAL ENDOSCOPY  2019       Family History   Problem Relation Age of Onset    Ulcerative colitis Mother     Arthritis Mother     Colon polyps Mother     Heart failure Father     Neuropathy Father     Macular degeneration Father     Diabetes Father     Early death Father     Vision loss Father     Asthma Daughter     Hypothyroidism Daughter     Heart disease Maternal Grandmother     Arthritis Maternal Grandmother     No Known Problems Maternal Grandfather     Arthritis Paternal Grandmother     Macular degeneration Paternal Grandmother     Vision loss Paternal Grandmother     Lymphoma Paternal Grandfather     Cancer Paternal Grandfather     Early death Paternal Grandfather     Breast cancer Maternal Aunt     Cancer Maternal Aunt     Miscarriages / Stillbirths Maternal Aunt     Heart failure Paternal Aunt     Heart failure Paternal Aunt     Irritable bowel syndrome Paternal Aunt     Breast cancer Paternal Aunt 54    Breast cancer additional onset Paternal Aunt 58    Hypothyroidism Paternal Aunt     Cancer Paternal Aunt     No Known Problems Son     No Known Problems Son     Kidney disease Brother     Depression Paternal Uncle     Early death Paternal Uncle      I have reviewed and agree  with the history as documented.    E-Cigarette/Vaping    E-Cigarette Use Never User      E-Cigarette/Vaping Substances    Nicotine No     THC No     CBD No     Flavoring No     Other No     Unknown No      Social History     Tobacco Use    Smoking status: Never    Smokeless tobacco: Never   Vaping Use    Vaping status: Never Used   Substance Use Topics    Alcohol use: Never    Drug use: Never       Review of Systems   Constitutional:  Positive for appetite change and fatigue. Negative for chills and fever.   HENT: Negative.  Negative for congestion, rhinorrhea and sore throat.    Eyes: Negative.  Negative for visual disturbance.   Respiratory: Negative.  Negative for cough, shortness of breath and wheezing.    Cardiovascular: Negative.  Negative for chest pain, palpitations and leg swelling.   Gastrointestinal:  Positive for abdominal pain, constipation, diarrhea and nausea. Negative for vomiting.   Genitourinary:  Positive for frequency. Negative for dysuria, flank pain and hematuria.   Musculoskeletal: Negative.  Negative for myalgias.   Skin: Negative.  Negative for rash.   Neurological:  Positive for headaches. Negative for dizziness, syncope and light-headedness.   Psychiatric/Behavioral: Negative.     All other systems reviewed and are negative.      Physical Exam  Physical Exam  Vitals and nursing note reviewed.   Constitutional:       General: She is awake. She is in acute distress.      Appearance: She is well-developed and overweight. She is not toxic-appearing or diaphoretic.   HENT:      Head: Normocephalic and atraumatic.      Right Ear: Hearing and external ear normal.      Left Ear: Hearing and external ear normal.      Nose: Nose normal.      Mouth/Throat:      Mouth: Mucous membranes are moist.      Tongue: Tongue does not deviate from midline.      Pharynx: Oropharynx is clear. Uvula midline.   Eyes:      General: Lids are normal. No scleral icterus.     Conjunctiva/sclera: Conjunctivae normal.       Pupils: Pupils are equal, round, and reactive to light.   Neck:      Trachea: Trachea and phonation normal. No tracheal deviation.   Cardiovascular:      Rate and Rhythm: Regular rhythm. Tachycardia present.      Pulses: Normal pulses.           Radial pulses are 2+ on the right side and 2+ on the left side.      Heart sounds: Normal heart sounds, S1 normal and S2 normal. No murmur heard.  Pulmonary:      Effort: Pulmonary effort is normal. No tachypnea or respiratory distress.      Breath sounds: Normal breath sounds. No wheezing, rhonchi or rales.   Abdominal:      General: Bowel sounds are normal. There is no distension.      Palpations: Abdomen is soft.      Tenderness: There is abdominal tenderness in the right upper quadrant and epigastric area. There is no right CVA tenderness, left CVA tenderness, guarding or rebound.   Musculoskeletal:         General: No tenderness. Normal range of motion.      Right lower leg: No edema.      Left lower leg: No edema.   Skin:     General: Skin is warm and dry.      Capillary Refill: Capillary refill takes less than 2 seconds.      Findings: No rash.   Neurological:      General: No focal deficit present.      Mental Status: She is alert and oriented to person, place, and time.      GCS: GCS eye subscore is 4. GCS verbal subscore is 5. GCS motor subscore is 6.      Sensory: No sensory deficit.      Motor: No abnormal muscle tone.   Psychiatric:         Mood and Affect: Mood is anxious. Affect is tearful.         Speech: Speech normal.         Behavior: Behavior normal. Behavior is cooperative.         Vital Signs  ED Triage Vitals [02/07/24 1612]   Temperature Pulse Respirations Blood Pressure SpO2   97.6 °F (36.4 °C) (!) 107 18 133/90 98 %      Temp Source Heart Rate Source Patient Position - Orthostatic VS BP Location FiO2 (%)   Temporal Monitor Lying Left arm --      Pain Score       9           Vitals:    02/07/24 1852 02/07/24 1900 02/07/24 2000 02/07/24 2030   BP:  96/57 122/70 107/57 94/60   Pulse: 91 80 84 86   Patient Position - Orthostatic VS: Lying Lying Lying Lying         Visual Acuity      ED Medications  Medications   ondansetron (ZOFRAN) injection 4 mg (4 mg Intravenous Given 2/7/24 1730)   HYDROmorphone (DILAUDID) injection 0.5 mg (0.5 mg Intravenous Given 2/7/24 1731)   sodium chloride 0.9 % bolus 1,000 mL (0 mL Intravenous Stopped 2/7/24 1828)   HYDROmorphone (DILAUDID) injection 0.5 mg (0.5 mg Intravenous Given 2/7/24 1853)   sodium chloride 0.9 % bolus 1,000 mL (0 mL Intravenous Stopped 2/7/24 2058)   ondansetron (ZOFRAN) injection 4 mg (4 mg Intravenous Given 2/7/24 1955)       Diagnostic Studies  Results Reviewed       Procedure Component Value Units Date/Time    C-reactive protein [[id number]]  (Abnormal) Collected: 02/07/24 1721    Lab Status: Final result Specimen: Blood Updated: 02/07/24 2018     CRP 4.3 mg/L     Protime-INR [[id number]]  (Normal) Collected: 02/07/24 1746    Lab Status: Final result Specimen: Blood from Arm, Left Updated: 02/07/24 1834     Protime 13.1 seconds      INR 1.00    APTT [[id number]]  (Normal) Collected: 02/07/24 1746    Lab Status: Final result Specimen: Blood from Arm, Left Updated: 02/07/24 1834     PTT 24 seconds     FLU/RSV/COVID - if FLU/RSV clinically relevant [[id number]]  (Normal) Collected: 02/07/24 1721    Lab Status: Final result Specimen: Nares from Nose Updated: 02/07/24 1806     SARS-CoV-2 Negative     INFLUENZA A PCR Negative     INFLUENZA B PCR Negative     RSV PCR Negative    Narrative:      FOR PEDIATRIC PATIENTS - copy/paste COVID Guidelines URL to browser: [email or web address]    SARS-CoV-2 assay is a Nucleic Acid Amplification assay intended for the  qualitative detection of nucleic acid from SARS-CoV-2 in nasopharyngeal  swabs. Results are for the presumptive identification of SARS-CoV-2 RNA.    Positive results are indicative of infection with SARS-CoV-2,  the virus  causing COVID-19, but do not rule out bacterial infection or co-infection  with other viruses. Laboratories within the [place] and its  territories are required to report all positive results to the appropriate  public health authorities. Negative results do not preclude SARS-CoV-2  infection and should not be used as the sole basis for treatment or other  patient management decisions. Negative results must be combined with  clinical observations, patient history, and epidemiological information.  This test has not been FDA cleared or approved.    This test has been authorized by FDA under an Emergency Use Authorization  (EUA). This test is only authorized for the duration of time the  declaration that circumstances exist justifying the authorization of the  emergency use of an in vitro diagnostic tests for detection of SARS-CoV-2  virus and/or diagnosis of COVID-19 infection under section 564(b)(1) of  the Act, 21 U.S.C. 360bbb-3(b)(1), unless the authorization is terminated  or revoked sooner. The test has been validated but independent review by FDA  and CLIA is pending.    Test performed using [employer] GeneXpert: This RT-PCR assay targets N2,  a region unique to SARS-CoV-2. A conserved region in the E-gene was chosen  for pan-Sarbecovirus detection which includes SARS-CoV-2.    According to CMS-2020-01-R, this platform meets the definition of high-throughput technology.    HS Troponin 0hr (reflex protocol) [[id number]]  (Normal) Collected: 02/07/24 1721    Lab Status: Final result Specimen: Blood from Arm, Left Updated: 02/07/24 1749     hs TnI 0hr <2 ng/L     Lactic acid, plasma (w/reflex if result > 2.0) [[id number]]  (Normal) Collected: 02/07/24 1721    Lab Status: Final result Specimen: Blood from Arm, Left Updated: 02/07/24 1748     LACTIC ACID 1.3 mmol/L     Narrative:      Result may be elevated if tourniquet was used during collection.    Comprehensive metabolic panel [[id number]] Collected:  02/07/24 1721    Lab Status: Final result Specimen: Blood from Arm, Left Updated: 02/07/24 1743     Sodium 137 mmol/L      Potassium 4.2 mmol/L      Chloride 102 mmol/L      CO2 27 mmol/L      ANION GAP 8 mmol/L      BUN 15 mg/dL      Creatinine 0.84 mg/dL      Glucose 80 mg/dL      Calcium 9.2 mg/dL      AST 16 U/L      ALT 29 U/L      Alkaline Phosphatase 61 U/L      Total Protein 7.1 g/dL      Albumin 4.3 g/dL      Total Bilirubin 0.40 mg/dL      eGFR 81 ml/min/1.73sq m     Narrative:      National Kidney Disease Foundation guidelines for Chronic Kidney Disease (CKD):     Stage 1 with normal or high GFR (GFR > 90 mL/min/1.73 square meters)    Stage 2 Mild CKD (GFR = 60-89 mL/min/1.73 square meters)    Stage 3A Moderate CKD (GFR = 45-59 mL/min/1.73 square meters)    Stage 3B Moderate CKD (GFR = 30-44 mL/min/1.73 square meters)    Stage 4 Severe CKD (GFR = 15-29 mL/min/1.73 square meters)    Stage 5 End Stage CKD (GFR <15 mL/min/1.73 square meters)  Note: GFR calculation is accurate only with a steady state creatinine    Lipase [[id number]]  (Normal) Collected: 02/07/24 1721    Lab Status: Final result Specimen: Blood from Arm, Left Updated: 02/07/24 1743     Lipase 21 u/L     Magnesium [[id number]]  (Normal) Collected: 02/07/24 1721    Lab Status: Final result Specimen: Blood from Arm, Left Updated: 02/07/24 1743     Magnesium 2.3 mg/dL     CBC and differential [[id number]]  (Abnormal) Collected: 02/07/24 1721    Lab Status: Final result Specimen: Blood from Arm, Left Updated: 02/07/24 1728     WBC 11.49 Thousand/uL      RBC 4.83 Million/uL      Hemoglobin 15.6 g/dL      Hematocrit 47.7 %      MCV 99 fL      MCH 32.3 pg      MCHC 32.7 g/dL      RDW 14.7 %      MPV 8.7 fL      Platelets 464 Thousands/uL      nRBC 0 /100 WBCs      Neutrophils Relative 65 %      Immat GRANS % 1 %      Lymphocytes Relative 22 %      Monocytes Relative 7 %      Eosinophils Relative 4 %      Basophils Relative 1 %      Neutrophils Absolute  7.58 Thousands/µL      Immature Grans Absolute 0.06 Thousand/uL      Lymphocytes Absolute 2.50 Thousands/µL      Monocytes Absolute 0.79 Thousand/µL      Eosinophils Absolute 0.41 Thousand/µL      Basophils Absolute 0.15 Thousands/µL     UA w Reflex to Microscopic w Reflex to Culture [[id number]]     Lab Status: No result Specimen: Urine, Clean Catch                    CT chest abdomen pelvis wo contrast   Final Result by Gareth Alvarez MD (02/07 1928)      1.  Intrahepatic and extrahepatic biliary ductal dilation, similar to prior MRI and greater than expected solely on the basis of a postcholecystectomy state. Repeat MRI may be performed to assess for underlying stricture or obstructing lesion.    Alternatively, ERCP/EUS may be performed if clinically indicated.   2.  Mild superior endplate deformity of the L3 vertebral body, new from 7/18/2023 and favored to be degenerative in etiology.   3.  Bilateral nonobstructing renal calculi.      The study was marked in EPIC for immediate notification.         Workstation performed: [id number]                    Procedures  ECG 12 Lead Documentation Only    Date/Time: 2/7/2024 6:53 PM    Performed by: Brianne Velazquez PA-C  Authorized by: Brianne Velazquez PA-C    Indications / Diagnosis:  Upper abdominal pain  ECG reviewed by me, the ED Provider: yes    Patient location:  ED  Previous ECG:     Comparison to cardiac monitor: Yes    Interpretation:     Interpretation: normal    Rate:     ECG rate:  90    ECG rate assessment: normal    Rhythm:     Rhythm: sinus rhythm    Ectopy:     Ectopy: none    QRS:     QRS axis:  Normal    QRS intervals:  Normal  Conduction:     Conduction: normal    ST segments:     ST segments:  Normal  T waves:     T waves: normal    Comments:      , QRS 78, QT/QTc 360/440; no acute ischemic changes.           ED Course  ED Course as of 02/07/24 2110 Wed Feb 07, 2024   1732 WBC(!): 11.49  Mildly elevated   1732 Hemoglobin(!): 15.6  Similar to  nine days ago   1732 Platelet Count(!): 464  Similar to prior   1744 GLUCOSE: 80   1745 Creatinine: 0.84   1745 BUN: 15   1745 Sodium: 137   1745 Potassium: 4.2   1745 Chloride: 102   1745 Carbon Dioxide: 27   1745 ANION GAP: 8   1745 Calcium: 9.2   1745 AST: 16   1745 ALT: 29   1745 ALK PHOS: 61   1745 Total Protein: 7.1   1745 Albumin: 4.3   1745 Total Bilirubin: 0.40   1745 GFR, Calculated: 81   1745 MAGNESIUM: 2.3   1745 Lipase: 21  Doubt pancreatitis   1751 hs TnI 0hr: <2  Not c/w ACS   1751 LACTIC ACID: 1.3   1807 SARS-COV-2: Negative   1807 INFLU A PCR: Negative   1807 INFLU B PCR: Negative   1807 RSV PCR: Negative   1829 Pt reassessed.  She reports still having pain.  Remains localized to RUQ.  Will redose analgesia.  Updated on labs.  Pending CT scan results at this time.   1836 POCT INR: 1.00   1836 PROTIME: 13.1   1836 PTT: 24   1851 HR improving with fluids, currently on recheck 93.   1932 CT chest abdomen pelvis wo contrast  IMPRESSION:     1.  Intrahepatic and extrahepatic biliary ductal dilation, similar to prior MRI and greater than expected solely on the basis of a postcholecystectomy state. Repeat MRI may be performed to assess for underlying stricture or obstructing lesion.   Alternatively, ERCP/EUS may be performed if clinically indicated.  2.  Mild superior endplate deformity of the L3 vertebral body, new from 7/18/2023 and favored to be degenerative in etiology.  3.  Bilateral nonobstructing renal calculi.   1941 TT to on call GI Dr. Servin to further discuss.   1945 Pt requesting something additional for nausea.   1951 Discussed with Dr. Servin of GI.  The biliary dilatation appears to be pre-existing and similar to prior MRI.  With normal LFTs, not concerning regarding a biliary obstruction.  If needing admission, indicates okay to stay at [facility] with supportive care.  NPO or clears, antiemetic, analgesia, PPI.  Recommends checking CRP if possible Crohn's flare.   1955 Pt reassessed. Updated  on my conversation with GI.  She notes pain still present but more bearable.  However given persistent pain, recommended admission which she is agreeable to.  She does indicate symptoms seem to improve with bowel rest, fluids and IV meds.   2010 TT to on call [facility] to discuss admission for symptom management.   2023 C-REACTIVE PROTEIN(!): 4.3  Mildly elevated, value of 3.4 nine days ago   2023 Per [facility], will be down to discuss.   2037 Discussed with Gilberto Martinez PA-C of [facility]; accepts in obs.   2057 UA still not obtained at this time.             HEART Risk Score      Flowsheet Row Most Recent Value   Heart Score Risk Calculator    History 0 Filed at: 02/07/2024 1854   ECG 0 Filed at: 02/07/2024 1854   Age 1 Filed at: 02/07/2024 1854   Risk Factors 1 Filed at: 02/07/2024 1854   Troponin 0 Filed at: 02/07/2024 1854   HEART Score 2 Filed at: 02/07/2024 1854                          SBIRT 20yo+      Flowsheet Row Most Recent Value   Initial Alcohol Screen: US AUDIT-C     1. How often do you have a drink containing alcohol? 0 Filed at: 02/07/2024 1612   2. How many drinks containing alcohol do you have on a typical day you are drinking?  0 Filed at: 02/07/2024 2009   3a. Male UNDER 65: How often do you have five or more drinks on one occasion? 0 Filed at: 02/07/2024 1612   3b. FEMALE Any Age, or MALE 65+: How often do you have 4 or more drinks on one occassion? 0 Filed at: 02/07/2024 2009   Audit-C Score 0 Filed at: 02/07/2024 2009   LAUREL: How many times in the past year have you...    Used an illegal drug or used a prescription medication for non-medical reasons? Never Filed at: 02/07/2024 2009                      Medical Decision Making  47 yo female presenting for evaluation of RUQ abdominal pain.  Complex GI history.  Prior records reviewed.  Will obtain EKG, labs, urine and CT imaging to further evaluate.  Numerous drug allergies reviewed.  She does indicate she is okay with zofran and either fentanyl or dilaudid for  pain given mast cell activation.  Will provide fluids and symptom relief.  Pt afebrile, hemodynamically stable.    Work up obtained as noted above.  EKG and troponin not c/w ACS.  Mild leukocytosis, no anemia.  WBC, Hgb and platelets elevated, likely concentrated in setting of dehydration. Normal lactic acid. HR improved with fluids, do not clinically suspect sepsis. No hypo or hyperglycemia.  Renal function and LFTs within normal limits.  Electrolytes within normal limits.  Covid/flu/rsv negative.  UA ordered but not yet collected at time of admission.  CT chest/abd/pelv - no acute findings.  There is noted biliary dilatation seen previously.  Bilateral non obstructing renal calculi seen however this would not cause acute pain.  Case was reviewed with GI who notes biliary dilatation seen on prior MRCP and with normal LFTs, not felt to be etiology of her acute pain.  If needing admission, they indicate pt could be admitted here at [facility] with bowel rest and supportive care.  Despite fluids and several rounds of medications, pt still have continued pain, nausea.  [facility] consulted for admission of intractable pain and symptomatic management.  Pt admitted in stable condition.    Please refer to above ER course for further details/discussion.     Problems Addressed:  Abdominal pain: acute illness or injury  Leukocytosis: acute illness or injury  Nausea: acute illness or injury    Amount and/or Complexity of Data Reviewed  External Data Reviewed: labs, radiology and notes.  Labs: ordered. Decision-making details documented in ED Course.  Radiology: ordered. Decision-making details documented in ED Course.  ECG/medicine tests: ordered and independent interpretation performed. Decision-making details documented in ED Course.  Discussion of management or test interpretation with external provider(s): GI, [facility]    Risk  Prescription drug management.  Decision regarding hospitalization.             Disposition  Final diagnoses:    Abdominal pain   Nausea   Leukocytosis     Time reflects when diagnosis was documented in both MDM as applicable and the Disposition within this note       Time User Action Codes Description Comment    2/7/2024  8:11 PM Brianne Velazquez [R10.9] Abdominal pain     2/7/2024  8:11 PM Brianne Velazquez [R11.0] Nausea     2/7/2024  8:38 PM Brianne Velazquez [D72.829] Leukocytosis           ED Disposition       ED Disposition   Admit    Condition   Stable    Date/Time   Wed Feb 7, 2024 2038    Comment   Case was discussed with Gilberto Martinez PA-C and the patient's admission status was agreed to be Admission Status: observation status to the service of Dr. Tripp.               Follow-up Information    None         Patient's Medications   Discharge Prescriptions    SCOPOLAMINE (TRANSDERM-SCOP) 1 MG/3 DAYS TD 72 HR PATCH    Place 1 patch on the skin over 72 hours every third day       Start Date: 2/7/2024  End Date: --       Order Dose: 1 patch       Quantity: 13 patch    Refills: 3       No discharge procedures on file.    PDMP Review         Value Time User    PDMP Reviewed  Yes 11/3/2023 12:18 PM JONATHAN Bustillo            ED Provider  Electronically Signed by             Brianne Velazquez PA-C  02/07/24 [id number]

## 2024-02-07 NOTE — TELEPHONE ENCOUNTER
Patient calling as she is experiencing RUQ pain. States that today pain is unbearable. Warm transferred to clinical triage

## 2024-02-07 NOTE — TELEPHONE ENCOUNTER
Pt is agreeable to going to the ED as she complains of 10/10 pain with SOB. She notes she was just hesitant because she wanted to make sure someone from the GI team would be able to see her in the ED. I explained that I would write in the transfer order that GI should be TT when the pt comes to the ED; I explained the GI rotation at [facility] is different from other campuses so she can go to [place] but she defers.

## 2024-02-08 ENCOUNTER — TELEPHONE (OUTPATIENT)
Dept: GYNECOLOGY | Facility: CLINIC | Age: 49
End: 2024-02-08

## 2024-02-08 LAB
ALBUMIN SERPL BCP-MCNC: 3.6 G/DL (ref 3.5–5)
ALP SERPL-CCNC: 48 U/L (ref 34–104)
ALT SERPL W P-5'-P-CCNC: 23 U/L (ref 7–52)
ANION GAP SERPL CALCULATED.3IONS-SCNC: 5 MMOL/L
AST SERPL W P-5'-P-CCNC: 13 U/L (ref 13–39)
BASOPHILS # BLD AUTO: 0.09 THOUSANDS/ÂΜL (ref 0–0.1)
BASOPHILS NFR BLD AUTO: 1 % (ref 0–1)
BILIRUB SERPL-MCNC: 0.49 MG/DL (ref 0.2–1)
BILIRUB UR QL STRIP: NEGATIVE
BUN SERPL-MCNC: 8 MG/DL (ref 5–25)
CALCIUM SERPL-MCNC: 7.9 MG/DL (ref 8.4–10.2)
CHLORIDE SERPL-SCNC: 106 MMOL/L (ref 96–108)
CLARITY UR: CLEAR
CO2 SERPL-SCNC: 28 MMOL/L (ref 21–32)
COLOR UR: YELLOW
CREAT SERPL-MCNC: 0.69 MG/DL (ref 0.6–1.3)
EOSINOPHIL # BLD AUTO: 0.37 THOUSAND/ÂΜL (ref 0–0.61)
EOSINOPHIL NFR BLD AUTO: 5 % (ref 0–6)
ERYTHROCYTE [DISTWIDTH] IN BLOOD BY AUTOMATED COUNT: 14.9 % (ref 11.6–15.1)
GFR SERPL CREATININE-BSD FRML MDRD: 101 ML/MIN/1.73SQ M
GLUCOSE SERPL-MCNC: 74 MG/DL (ref 65–140)
GLUCOSE UR STRIP-MCNC: NEGATIVE MG/DL
HCT VFR BLD AUTO: 43.9 % (ref 34.8–46.1)
HGB BLD-MCNC: 14.1 G/DL (ref 11.5–15.4)
HGB UR QL STRIP.AUTO: NEGATIVE
IMM GRANULOCYTES # BLD AUTO: 0.06 THOUSAND/UL (ref 0–0.2)
IMM GRANULOCYTES NFR BLD AUTO: 1 % (ref 0–2)
KETONES UR STRIP-MCNC: NEGATIVE MG/DL
LEUKOCYTE ESTERASE UR QL STRIP: NEGATIVE
LYMPHOCYTES # BLD AUTO: 2.51 THOUSANDS/ÂΜL (ref 0.6–4.47)
LYMPHOCYTES NFR BLD AUTO: 34 % (ref 14–44)
MAGNESIUM SERPL-MCNC: 2.3 MG/DL (ref 1.9–2.7)
MCH RBC QN AUTO: 32.6 PG (ref 26.8–34.3)
MCHC RBC AUTO-ENTMCNC: 32.1 G/DL (ref 31.4–37.4)
MCV RBC AUTO: 102 FL (ref 82–98)
MONOCYTES # BLD AUTO: 0.63 THOUSAND/ÂΜL (ref 0.17–1.22)
MONOCYTES NFR BLD AUTO: 9 % (ref 4–12)
NEUTROPHILS # BLD AUTO: 3.79 THOUSANDS/ÂΜL (ref 1.85–7.62)
NEUTS SEG NFR BLD AUTO: 50 % (ref 43–75)
NITRITE UR QL STRIP: NEGATIVE
NRBC BLD AUTO-RTO: 0 /100 WBCS
PH UR STRIP.AUTO: 6.5 [PH]
PHOSPHATE SERPL-MCNC: 2.6 MG/DL (ref 2.7–4.5)
PLATELET # BLD AUTO: 416 THOUSANDS/UL (ref 149–390)
PMV BLD AUTO: 8.6 FL (ref 8.9–12.7)
POTASSIUM SERPL-SCNC: 4.1 MMOL/L (ref 3.5–5.3)
PROT SERPL-MCNC: 5.8 G/DL (ref 6.4–8.4)
PROT UR STRIP-MCNC: NEGATIVE MG/DL
RBC # BLD AUTO: 4.32 MILLION/UL (ref 3.81–5.12)
SODIUM SERPL-SCNC: 139 MMOL/L (ref 135–147)
SP GR UR STRIP.AUTO: 1.01 (ref 1–1.03)
UROBILINOGEN UR QL STRIP.AUTO: 0.2 E.U./DL
WBC # BLD AUTO: 7.45 THOUSAND/UL (ref 4.31–10.16)

## 2024-02-08 PROCEDURE — 99232 SBSQ HOSP IP/OBS MODERATE 35: CPT | Performed by: PHYSICIAN ASSISTANT

## 2024-02-08 PROCEDURE — 80053 COMPREHEN METABOLIC PANEL: CPT | Performed by: PHYSICIAN ASSISTANT

## 2024-02-08 PROCEDURE — 85025 COMPLETE CBC W/AUTO DIFF WBC: CPT | Performed by: PHYSICIAN ASSISTANT

## 2024-02-08 PROCEDURE — 83735 ASSAY OF MAGNESIUM: CPT | Performed by: PHYSICIAN ASSISTANT

## 2024-02-08 PROCEDURE — 81003 URINALYSIS AUTO W/O SCOPE: CPT | Performed by: PHYSICIAN ASSISTANT

## 2024-02-08 PROCEDURE — 84100 ASSAY OF PHOSPHORUS: CPT | Performed by: PHYSICIAN ASSISTANT

## 2024-02-08 RX ORDER — HYDROMORPHONE HCL/PF 1 MG/ML
0.5 SYRINGE (ML) INJECTION EVERY 4 HOURS PRN
Status: DISCONTINUED | OUTPATIENT
Start: 2024-02-08 | End: 2024-02-13 | Stop reason: HOSPADM

## 2024-02-08 RX ORDER — LORAZEPAM 2 MG/ML
1 INJECTION INTRAMUSCULAR ONCE
Status: COMPLETED | OUTPATIENT
Start: 2024-02-08 | End: 2024-02-08

## 2024-02-08 RX ORDER — DIPHENHYDRAMINE HYDROCHLORIDE 50 MG/ML
25 INJECTION INTRAMUSCULAR; INTRAVENOUS EVERY 4 HOURS PRN
Status: DISCONTINUED | OUTPATIENT
Start: 2024-02-08 | End: 2024-02-10

## 2024-02-08 RX ORDER — BUTALBITAL, ACETAMINOPHEN AND CAFFEINE 50; 325; 40 MG/1; MG/1; MG/1
1 TABLET ORAL ONCE
Status: COMPLETED | OUTPATIENT
Start: 2024-02-08 | End: 2024-02-08

## 2024-02-08 RX ORDER — HYDROMORPHONE HCL/PF 1 MG/ML
0.5 SYRINGE (ML) INJECTION ONCE
Status: COMPLETED | OUTPATIENT
Start: 2024-02-08 | End: 2024-02-08

## 2024-02-08 RX ORDER — DIPHENHYDRAMINE HYDROCHLORIDE 50 MG/ML
50 INJECTION INTRAMUSCULAR; INTRAVENOUS ONCE
Status: COMPLETED | OUTPATIENT
Start: 2024-02-08 | End: 2024-02-08

## 2024-02-08 RX ADMIN — DIPHENHYDRAMINE HYDROCHLORIDE 25 MG: 50 INJECTION, SOLUTION INTRAMUSCULAR; INTRAVENOUS at 10:25

## 2024-02-08 RX ADMIN — SODIUM CHLORIDE 75 ML/HR: 0.9 INJECTION, SOLUTION INTRAVENOUS at 22:45

## 2024-02-08 RX ADMIN — DIPHENHYDRAMINE HYDROCHLORIDE 25 MG: 50 INJECTION, SOLUTION INTRAMUSCULAR; INTRAVENOUS at 14:33

## 2024-02-08 RX ADMIN — LORAZEPAM 1 MG: 2 INJECTION INTRAMUSCULAR; INTRAVENOUS at 08:59

## 2024-02-08 RX ADMIN — ONDANSETRON 4 MG: 2 INJECTION INTRAMUSCULAR; INTRAVENOUS at 16:51

## 2024-02-08 RX ADMIN — BUTALBITAL, ACETAMINOPHEN, AND CAFFEINE 1 TABLET: 50; 325; 40 TABLET ORAL at 11:44

## 2024-02-08 RX ADMIN — ONDANSETRON 4 MG: 2 INJECTION INTRAMUSCULAR; INTRAVENOUS at 05:45

## 2024-02-08 RX ADMIN — HYDROMORPHONE HYDROCHLORIDE 0.5 MG: 1 INJECTION, SOLUTION INTRAMUSCULAR; INTRAVENOUS; SUBCUTANEOUS at 22:10

## 2024-02-08 RX ADMIN — SODIUM CHLORIDE 75 ML/HR: 0.9 INJECTION, SOLUTION INTRAVENOUS at 10:24

## 2024-02-08 RX ADMIN — HYDROMORPHONE HYDROCHLORIDE 0.5 MG: 1 INJECTION, SOLUTION INTRAMUSCULAR; INTRAVENOUS; SUBCUTANEOUS at 17:29

## 2024-02-08 RX ADMIN — DIPHENHYDRAMINE HYDROCHLORIDE 50 MG: 50 INJECTION, SOLUTION INTRAMUSCULAR; INTRAVENOUS at 22:45

## 2024-02-08 RX ADMIN — BUTALBITAL, ACETAMINOPHEN, AND CAFFEINE 1 TABLET: 50; 325; 40 TABLET ORAL at 20:29

## 2024-02-08 RX ADMIN — HYDROMORPHONE HYDROCHLORIDE 0.5 MG: 1 INJECTION, SOLUTION INTRAMUSCULAR; INTRAVENOUS; SUBCUTANEOUS at 13:18

## 2024-02-08 RX ADMIN — HYDROMORPHONE HYDROCHLORIDE 0.5 MG: 1 INJECTION, SOLUTION INTRAMUSCULAR; INTRAVENOUS; SUBCUTANEOUS at 03:24

## 2024-02-08 RX ADMIN — DIPHENHYDRAMINE HYDROCHLORIDE 25 MG: 50 INJECTION, SOLUTION INTRAMUSCULAR; INTRAVENOUS at 18:55

## 2024-02-08 RX ADMIN — HYDROMORPHONE HYDROCHLORIDE 0.5 MG: 1 INJECTION, SOLUTION INTRAMUSCULAR; INTRAVENOUS; SUBCUTANEOUS at 08:59

## 2024-02-08 NOTE — PLAN OF CARE
Problem: PAIN - ADULT  Goal: Verbalizes/displays adequate comfort level or baseline comfort level  Description: Interventions:  - Encourage patient to monitor pain and request assistance  - Assess pain using appropriate pain scale  - Administer analgesics based on type and severity of pain and evaluate response  - Implement non-pharmacological measures as appropriate and evaluate response  - Consider cultural and social influences on pain and pain management  - Notify physician/advanced practitioner if interventions unsuccessful or patient reports new pain  Outcome: Progressing     Problem: INFECTION - ADULT  Goal: Absence or prevention of progression during hospitalization  Description: INTERVENTIONS:  - Assess and monitor for signs and symptoms of infection  - Monitor lab/diagnostic results  - Monitor all insertion sites, i.e. indwelling lines  - Monitor endotracheal if appropriate and nasal secretions for changes in amount and color  - [place] appropriate cooling/warming therapies per order  - Administer medications as ordered  - Instruct and encourage patient and family to use good hand hygiene technique  - Identify and instruct in appropriate isolation precautions for identified infection/condition  Outcome: Progressing     Problem: SAFETY ADULT  Goal: Patient will remain free of falls  Description: INTERVENTIONS:  - Educate patient/family on patient safety including physical limitations  - Instruct patient to call for assistance with activity   - Consult OT/PT to assist with strengthening/mobility   - Keep Call bell within reach  - Keep bed low and locked with side rails adjusted as appropriate  - Keep care items and personal belongings within reach  - Initiate and maintain comfort rounds  - Make Fall Risk Sign visible to staff  - Offer Toileting every 4 Hours, in advance of need  - Initiate/Maintain bed alarm  - Obtain necessary fall risk management equipment  - Apply yellow socks and bracelet for high  fall risk patients  - Consider moving patient to room near nurses station  Outcome: Progressing  Goal: Maintain or return to baseline ADL function  Description: INTERVENTIONS:  -  Assess patient's ability to carry out ADLs; assess patient's baseline for ADL function and identify physical deficits which impact ability to perform ADLs (bathing, care of mouth/teeth, toileting, grooming, dressing, etc.)  - Assess/evaluate cause of self-care deficits   - Assess range of motion  - Assess patient's mobility; develop plan if impaired  - Assess patient's need for assistive devices and provide as appropriate  - Encourage maximum independence but intervene and supervise when necessary  - Involve family in performance of ADLs  - Assess for home care needs following discharge   - Consider OT consult to assist with ADL evaluation and planning for discharge  - Provide patient education as appropriate  Outcome: Progressing  Goal: Maintains/Returns to pre admission functional level  Description: INTERVENTIONS:  - Perform AM-PAC 6 Click Basic Mobility/ Daily Activity assessment daily.  - Set and communicate daily mobility goal to care team and patient/family/caregiver.   - Collaborate with rehabilitation services on mobility goals if consulted  - Perform Range of Motion 3 times a day.  - Reposition patient every 2 hours.  - Dangle patient 3 times a day  - Stand patient 3 times a day  - Ambulate patient 3 times a day  - Out of bed to chair 3 times a day   - Out of bed for meals 3 times a day  - Out of bed for toileting  - Record patient progress and toleration of activity level   Outcome: Progressing     Problem: Nutrition/Hydration-ADULT  Goal: Nutrient/Hydration intake appropriate for improving, restoring or maintaining nutritional needs  Description: Monitor and assess patient's nutrition/hydration status for malnutrition. Collaborate with interdisciplinary team and initiate plan and interventions as ordered.  Monitor patient's  weight and dietary intake as ordered or per policy. Utilize nutrition screening tool and intervene as necessary. Determine patient's food preferences and provide high-protein, high-caloric foods as appropriate.     INTERVENTIONS:  - Monitor oral intake, urinary output, labs, and treatment plans  - Assess nutrition and hydration status and recommend course of action  - Evaluate amount of meals eaten  - Assist patient with eating if necessary   - Allow adequate time for meals  - Recommend/ encourage appropriate diets, oral nutritional supplements, and vitamin/mineral supplements  - Order, calculate, and assess calorie counts as needed  - Assess need for intravenous fluids  - Provide specific nutrition/hydration education as appropriate  - Include patient/family/caregiver in decisions related to nutrition  Outcome: Progressing     Problem: GASTROINTESTINAL - ADULT  Goal: Minimal or absence of nausea and/or vomiting  Description: INTERVENTIONS:  - Administer IV fluids if ordered to ensure adequate hydration  - Maintain NPO status until nausea and vomiting are resolved  - Nasogastric tube if ordered  - Administer ordered antiemetic medications as needed  - Provide nonpharmacologic comfort measures as appropriate  - Advance diet as tolerated, if ordered  - Consider nutrition services referral to assist patient with adequate nutrition and appropriate food choices  Outcome: Progressing  Goal: Maintains or returns to baseline bowel function  Description: INTERVENTIONS:  - Assess bowel function  - Encourage oral fluids to ensure adequate hydration  - Administer IV fluids if ordered to ensure adequate hydration  - Administer ordered medications as needed  - Encourage mobilization and activity  - Consider nutritional services referral to assist patient with adequate nutrition and appropriate food choices  Outcome: Progressing  Goal: Maintains adequate nutritional intake  Description: INTERVENTIONS:  - Monitor percentage of each  meal consumed  - Identify factors contributing to decreased intake, treat as appropriate  - Assist with meals as needed  - Monitor I&O, weight, and lab values if indicated  - Obtain nutrition services referral as needed  Outcome: Progressing     Problem: METABOLIC, FLUID AND ELECTROLYTES - ADULT  Goal: Electrolytes maintained within normal limits  Description: INTERVENTIONS:  - Monitor labs and assess patient for signs and symptoms of electrolyte imbalances  - Administer electrolyte replacement as ordered  - Monitor response to electrolyte replacements, including repeat lab results as appropriate  - Instruct patient on fluid and nutrition as appropriate  Outcome: Progressing  Goal: Fluid balance maintained  Description: INTERVENTIONS:  - Monitor labs   - Monitor I/O and WT  - Instruct patient on fluid and nutrition as appropriate  - Assess for signs & symptoms of volume excess or deficit  Outcome: Progressing

## 2024-02-08 NOTE — H&P
[facility]  H&P  Name: Celine Roa 48 y.o. female I MRN: [id number]  Unit/Bed#: 402-01 I Date of Admission: 2/7/2024   Date of Service: 2/8/2024 I Hospital Day: 0      Assessment/Plan   * Intractable right upper quadrant abdominal pain  Assessment & Plan  Presented to the emergency room with complaints of abdominal pain  Patient reports pain in right upper quadrant without radiation.   Associated with nausea and dry heaving.  Patient reports subjective fever on Monday  Denies diarrhea  CT shows-  Intrahepatic and extrahepatic biliary ductal dilation, similar to prior MRI and greater than expected solely on the basis of a postcholecystectomy state. Repeat MRI may be performed to assess for underlying stricture or obstructing lesion. Alternatively, ERCP/EUS may be performed if clinically indicated.  Received Pain medication in the ER with little relief  ER Provider discussed case with GI.   Admit to observation  Will keep NPO for now  Continue IV fluids  Pain medication PRN  Zofran PRN  GI consult  AM labs  Supportive care     Crohn's colitis (HCC)  Assessment & Plan  History of Crohn's  No evidence of acute flare  Continue PTA medication  Continue GI follow-up    Systemic lupus erythematosus, unspecified SLE type, unspecified organ involvement status (HCC)  Assessment & Plan  Stable  Continue PTA medication    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HCC)  Assessment & Plan  Stable  Continue PTA medication  Continue outpatient hematology, immunology follow-up    Acquired hypothyroidism  Assessment & Plan  Stable  Continue levothyroxine           VTE Pharmacologic Prophylaxis:   Moderate Risk (Score 3-4) - Pharmacological DVT Prophylaxis Contraindicated. Sequential Compression Devices Ordered.  Code Status: Level 1 - Full Code   Discussion with family: Updated  () at bedside.    Anticipated Length of Stay: Patient will be admitted on an observation basis with an anticipated length  of stay of less than 2 midnights secondary to intractable abdominal pain.    Total Time Spent on Date of Encounter in care of patient: 40 mins. This time was spent on one or more of the following: performing physical exam; counseling and coordination of care; obtaining or reviewing history; documenting in the medical record; reviewing/ordering tests, medications or procedures; communicating with other healthcare professionals and discussing with patient's family/caregivers.    Chief Complaint: Abdominal pain    History of Present Illness:  Celine Roa is a 48 y.o. female with a PMH of mast cell disease, hypothyroidism, Crohn's, lupus, GERD, asthma who presents to the emergency room for evaluation of complaint of worsening abdominal pain.  Patient reports pain is right upper quadrant and has been having this pain for several months however over the past few days has been getting progressively worse.  Pain is associated with nausea, dry heaving.  Patient also reported that she had a fever on Monday.  Patient denies having any chest pain, shortness of breath, dizziness, weakness, diarrhea, or any other symptoms.     Workup in the emergency room included labs significant for WBC of 11.49, hemoglobin of 15.6, hematocrit of 47.7, platelet of 464, CRP of 4.3.  COVID-19/flu/RSV negative.  CT chest abdomen and pelvis completed with results as shown below.  While in the emergency room patient received Zofran 4 mg IV x 2, normal saline 2 L, Dilaudid 0.5 mg x 2.  Patient still with severe pain after administration of medication.    Patient is being admitted on observation status [facility] care for further workup and management of intractable abdominal pain.     Review of Systems:  Review of Systems   Constitutional:  Positive for appetite change and fever. Negative for activity change.   Respiratory:  Negative for cough, chest tightness, shortness of breath and wheezing.    Cardiovascular:  Negative for chest pain,  palpitations and leg swelling.   Gastrointestinal:  Positive for abdominal pain and nausea. Negative for diarrhea and vomiting.   Genitourinary:  Negative for difficulty urinating, dysuria, flank pain and hematuria.   Musculoskeletal:  Negative for back pain, myalgias, neck pain and neck stiffness.   Skin:  Negative for rash and wound.   Allergic/Immunologic: Positive for immunocompromised state.   Neurological:  Negative for dizziness, tremors, syncope, weakness and headaches.   Psychiatric/Behavioral:  Positive for sleep disturbance. Negative for agitation and confusion. The patient is not nervous/anxious.        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 pt denies, documented in cardio note    Pancreatitis     sphinger of     Psychiatric disorder     RA (rheumatoid arthritis) (HCC)     Seizures (HCC)     Ulcerative colitis (HCC)     Visual impairment        Past Surgical History:   Procedure Laterality Date    ABDOMINAL SURGERY  2017    APPENDECTOMY      CHOLECYSTECTOMY      COLONOSCOPY  2019    CYSTOSCOPY N/A 6/8/2023    Procedure: CYSTOSCOPY, mini TURBT with fulgeration;  Surgeon: Tee Bruno MD;  Location: [facility];  Service: Urology    EGD AND COLONOSCOPY N/A 09/12/2017    Procedure: EGD AND COLONOSCOPY;  Surgeon: Tommy Oliver MD;  Location:  GI LAB;  Service: Gastroenterology     ERCP N/A 09/15/2017    Procedure: ENDOSCOPIC RETROGRADE CHOLANGIOPANCREATOGRAPHY (ERCP);  Surgeon: Dre Soto MD;  Location: [facility];  Service: Gastroenterology    HYSTERECTOMY      KNEE SURGERY Right     LAPAROSCOPIC ENDOMETRIOSIS FULGURATION      ORIF ANKLE FRACTURE Left     MD ARTHRS KNE SURG W/MENISCECTOMY MED/LAT W/SHVG Left 05/12/2017    Procedure: POSSIBLE MEDIAL MENISECTOMY;  Surgeon: Kristian Avila MD;  Location: [facility];  Service: Orthopedics    MD ARTHRS KNEE DEBRIDEMENT/SHAVING ARTCLR CRTLG Left 05/12/2017    Procedure: KNEE ARTHROSCOPY EVALUATION, CHONDROPLASTY;  Surgeon: Kristian Avila MD;  Location: [facility];  Service: Orthopedics    MD LAPS ABD PRTM&OMENTUM DX W/WO SPEC BR/WA SPX N/A 12/12/2017    Procedure: LAPAROSCOPY DIAGNOSTIC  WITH LYSIS OF ADHESIONS;  Surgeon: Olaf John DO;  Location:  MAIN OR;  Service: General    UPPER GASTROINTESTINAL ENDOSCOPY  2019       Meds/Allergies:  Prior to Admission medications    Medication Sig Start Date End Date Taking? Authorizing Provider   Ascorbic Acid (vitamin C) 1000 MG tablet Take 1,000 mg by mouth 2 (two) times a day   Yes Historical Provider, MD   cetirizine (ZyrTEC) 10 mg tablet Take 20 mg by mouth daily   Yes Historical Provider, MD   cholecalciferol (VITAMIN D3) 1,000 units tablet Take 1,000 Units by mouth 2 (two) times a day   Yes Historical Provider, MD   cyanocobalamin (VITAMIN B-12) 100 mcg tablet Take by mouth daily   Yes Historical Provider, MD   diphenhydrAMINE (BENADRYL) 50 mg capsule Take 50 mg by mouth 4 (four) times a day as needed for itching Pt states takes 50mg daily, but may go up to 4 times daily prn   Yes Historical Provider, MD   famotidine (PEPCID) 40 MG tablet TAKE ONE TABLET BY MOUTH ONCE DAILY 12/29/23  Yes Yehuda Levine MD   folic acid (KP Folic Acid) 1 mg tablet Take 1 tablet (1 mg total) by mouth daily 10/23/23  Yes Yehuda Levine MD   hydrOXYzine HCL (ATARAX) 25 mg tablet Take 15 mg by mouth daily  "5/26/23  Yes Historical Provider, MD   levothyroxine 100 mcg tablet Take 75 mcg by mouth every other day   Yes Historical Provider, MD   liothyronine (CYTOMEL) 5 mcg tablet Take 1 tablet (5 mcg total) by mouth daily Do not start before June 14, 2023. 6/14/23  Yes Sera Pink PA-C   magnesium gluconate (MAGONATE) 500 mg tablet Take 500 mg by mouth 2 (two) times a day   Yes Historical Provider, MD   Melatonin 3 MG SUBL 6 mg daily at bedtime 8/15/23  Yes Historical Provider, MD   methotrexate 50 MG/2ML injection Inject 1 mL (25 mg total) under the skin once a week 11/1/23  Yes Yehuda Levine MD   mirtazapine (REMERON) 15 mg tablet Take 1 tablet (15 mg total) by mouth daily at bedtime 1/23/24  Yes Diogenes Lewis DO   montelukast (SINGULAIR) 10 mg tablet Take 1 tablet (10 mg total) by mouth daily at bedtime 8/15/23  Yes Letha Carrero MD   predniSONE 20 mg tablet Take 40mg per day for 1 week, then 30mg per day for 1 week, then 20mg per day for 1 week and then 10mg per day for 1 week. 1/17/24  Yes Yehuda Levine MD   Riboflavin (Vitamin B-2) 100 MG TABS Take 100 mg by mouth 2 (two) times a day   Yes Historical Provider, MD JOHNS-HARISH 3CC LUER-SHAMEKA SYR 25GX1\" 25G X 1\" 3 ML MISC  6/1/22   Historical Provider, MD   butalbital-acetaminophen-caffeine (FIORICET,ESGIC) -40 mg per tablet Take 1 tablet by mouth every 8 (eight) hours as needed for migraine  Patient not taking: Reported on 1/29/2024 6/23/23   Holden Gill DO   Cholecalciferol 10 MCG /0.025ML LIQD Take 1,000 Units by mouth 2 (two) times a day  Patient not taking: Reported on 1/29/2024 1/20/23   Holden Gill DO   diclofenac (VOLTAREN) 75 mg EC tablet  7/14/23   Historical Provider, MD   EpiPen 2-Malachi 0.3 MG/0.3ML SOAJ INJECT 1 PEN INTO THE MUSCLE AS NEEDED FOR ANAPHALAXIS  Patient not taking: Reported on 1/29/2024 5/30/23   Historical Provider, MD   Lactobacillus (PROBIOTIC ACIDOPHILUS PO) Take by mouth    Historical Provider, MD " "  Lactobacillus-Inulin ([facility]) CAPS Take 200 mg by mouth daily    Historical Provider, MD   levalbuterol (XOPENEX HFA) 45 mcg/act inhaler Inhale 1-2 puffs every 4 (four) hours as needed for shortness of breath 5/23/23   Holden Gill DO   melatonin 3 mg Take 1 tablet (3 mg total) by mouth daily at bedtime  Patient not taking: Reported on 1/29/2024 8/15/23   Letha Carrero MD   methotrexate 50 MG/2ML injection Inject 1 mL (25 mg total) under the skin once a week  Patient not taking: Reported on 1/29/2024 10/23/23   Yehuda Levine MD   NEEDLE, DISP, 27 G (B-D DISP NEEDLE 67PJ5-3/4\") 27G X 1-1/4\" MISC Use once a week 10/23/23   Yehuda Levine MD   NON FORMULARY immunoglobin sq 6g 30 ml once a week    Historical Provider, MD   nystatin (MYCOSTATIN) 500,000 units/5 mL suspension Swish and spit 5 mL (500,000 Units total) 4 (four) times a day  Patient not taking: Reported on 9/13/2023 3/10/23   Teresa Nuñez PA-C   omalizumab (XOLAIR) 150 mg Inject 2.4 mL (300 mg total) under the skin every 21 days  Patient taking differently: Inject 300 mg under the skin every 28 days 11/27/23   Olaf Barrios MD   pantoprazole (PROTONIX) 40 mg tablet TAKE ONE TABLET BY MOUTH TWICE A DAY  Patient not taking: Reported on 11/27/2023 12/30/22   Sera Rincon PA-C   Riboflavin (VITAMIN B-2 PO) Take by mouth    Historical Provider, MD   risankizumab-rzaa (Skyrizi) 360 MG/2.4ML SOCT Take first on body injector (OBI) under skin on week 12 then every 8 weeks there after 12/12/23   Yehuda Levine MD   scopolamine (TRANSDERM-SCOP) 1 mg/3 days TD 72 hr patch Place 1 patch on the skin over 72 hours every third day  Patient not taking: Reported on 2/7/2024 2/7/24   Yehuda Levine MD   TechLite Lancets MISC  4/15/22   Historical Provider, MD   traMADol (Ultram) 50 mg tablet Take 1 tablet (50 mg total) by mouth every 12 (twelve) hours as needed for moderate pain or severe pain  Patient not taking: " Reported on 9/14/2023 6/23/23   Holden Gill, DO     I have reviewed home medications using recent Epic encounter.    Allergies:   Allergies   Allergen Reactions    Aimovig [Erenumab-Aooe] Anaphylaxis    Amitriptyline Anaphylaxis     According to the patient she had nphylaxis    Aspergillus Allergy Skin Test Other (See Comments), Hives and Swelling    Azathioprine Other (See Comments) and Anaphylaxis     pancreatitis  According to patient she needed Epipen    Banana - Food Allergy Anaphylaxis, Itching, Swelling and Tongue Swelling    Buspar [Buspirone] Hives and Shortness Of Breath    Citric Acid-Polysorbate 80 Abdominal Pain, Anaphylaxis, Anxiety, Bradycardia, Diarrhea, Fatigue, GI Intolerance, Headache, Hives, Hyperactivity, Itching, Lip Swelling, Nausea Only, Palpitations, Rash, Shortness Of Breath, Tachycardia, Throat Swelling, Tongue Swelling and Vomiting    Corn Dextrin Anaphylaxis     Any corn based products    Eggs Or Egg-Derived Products - Food Allergy Anaphylaxis    Epinephrine Anaphylaxis    Erenumab Anaphylaxis     patient sent portal message stating she had anaphylaxis  patient sent portal message stating she had anaphylaxis      Gadolinium Abdominal Pain, Anaphylaxis, Anxiety, Confusion, Delirium, Dizziness, Eye Swelling, Fatigue, GI Intolerance, Headache, Hives, Irritability, Itching, Lip Swelling, Nausea Only, Palpitations, Photosensitivity, Rash, Seizures, Shortness Of Breath, Swelling, Syncope, Throat Swelling, Tongue Swelling, Tremor, Visual Disturbance and Vomiting    Gelatin - Food Allergy Anaphylaxis    Heparin Hives     Painful welts     Lactated Ringers Shortness Of Breath     Pt questions this allergy, pt has received LR multiple times without reaction    Lavender Oil Anaphylaxis     Other reaction(s): anaphalxis    Malto Dextrin [Dextrin] Anaphylaxis    Other Anaphylaxis     Gel caps, maltodextrose, dextrose,grapes, lavender     Penicillium Notatum Shortness Of Breath and Swelling     "Red Dye - Food Allergy Anaphylaxis and Other (See Comments)     intolerance    Sumatriptan Anaphylaxis     Bradycardia, sob, back pressure, head pain,throat tightness  According to the patient she had anphylaxis    Ustekinumab Anaphylaxis    Contrast [Iodinated Contrast Media] Other (See Comments)     Pt states needs to be premedicated prior to injection    Corn Oil - Food Allergy - Food Allergy Hives    Humira [Adalimumab] Other (See Comments)     \"I develop drug induced lupus\"    Ibuprofen Hives and Throat Swelling    Polyethylene Glycol Diarrhea and Vomiting    Remicade [Infliximab] Other (See Comments)     \"I develop drug induced lupus\"    Soy Allergy - Food Allergy Other (See Comments)    Sulfa Antibiotics Hives and Other (See Comments)    Sulfate Hives    Sulfites - Food Allergy Hives    Sweet Corn Extract Skin Test - Food Allergy Hives and Itching    Chlorhexidine Itching    Freederm Adhesive Remover [New Skin] Rash    Histamine Hives, Rash and Other (See Comments)     Intolerance      Wound Dressings Rash       Social History:  Marital Status: /Civil Union   Occupation: Disability  Patient Pre-hospital Living Situation: Home  Patient Pre-hospital Level of Mobility: walks  Patient Pre-hospital Diet Restrictions: None reported  Substance Use History:   Social History     Substance and Sexual Activity   Alcohol Use Never     Social History     Tobacco Use   Smoking Status Never   Smokeless Tobacco Never     Social History     Substance and Sexual Activity   Drug Use Never       Family History:  Family History   Problem Relation Age of Onset    Ulcerative colitis Mother     Arthritis Mother     Colon polyps Mother     Heart failure Father     Neuropathy Father     Macular degeneration Father     Diabetes Father     Early death Father     Vision loss Father     Asthma Daughter     Hypothyroidism Daughter     Heart disease Maternal Grandmother     Arthritis Maternal Grandmother     No Known Problems " "Maternal Grandfather     Arthritis Paternal Grandmother     Macular degeneration Paternal Grandmother     Vision loss Paternal Grandmother     Lymphoma Paternal Grandfather     Cancer Paternal Grandfather     Early death Paternal Grandfather     Breast cancer Maternal Aunt     Cancer Maternal Aunt     Miscarriages / Stillbirths Maternal Aunt     Heart failure Paternal Aunt     Heart failure Paternal Aunt     Irritable bowel syndrome Paternal Aunt     Breast cancer Paternal Aunt 54    Breast cancer additional onset Paternal Aunt 58    Hypothyroidism Paternal Aunt     Cancer Paternal Aunt     No Known Problems Son     No Known Problems Son     Kidney disease Brother     Depression Paternal Uncle     Early death Paternal Uncle        Physical Exam:     Vitals:   Blood Pressure: 91/63 (02/08/24 0306)  Pulse: 84 (02/08/24 0306)  Temperature: 97.9 °F (36.6 °C) (02/07/24 2339)  Temp Source: Oral (02/07/24 2145)  Respirations: 18 (02/08/24 0306)  Height: 5' 5.25\" (165.7 cm) (02/07/24 2145)  Weight - Scale: 75 kg (165 lb 6.4 oz) (02/07/24 2145)  SpO2: 95 % (02/08/24 0306)    Physical Exam  Constitutional:       General: She is not in acute distress.     Appearance: She is not ill-appearing.   HENT:      Head: Normocephalic and atraumatic.      Nose: No congestion or rhinorrhea.      Mouth/Throat:      Mouth: Mucous membranes are moist.      Pharynx: Oropharynx is clear. No oropharyngeal exudate or posterior oropharyngeal erythema.   Eyes:      General:         Right eye: No discharge.         Left eye: No discharge.   Cardiovascular:      Rate and Rhythm: Normal rate and regular rhythm.      Pulses: Normal pulses.   Pulmonary:      Effort: No respiratory distress.      Breath sounds: No stridor. No wheezing, rhonchi or rales.   Abdominal:      General: There is no distension.      Palpations: Abdomen is soft.      Tenderness: There is abdominal tenderness.   Musculoskeletal:      Cervical back: Neck supple. No rigidity or " tenderness.      Right lower leg: No edema.      Left lower leg: No edema.   Skin:     Capillary Refill: Capillary refill takes less than 2 seconds.      Coloration: Skin is not jaundiced or pale.      Findings: No bruising, erythema or rash.   Neurological:      Mental Status: She is alert and oriented to person, place, and time.   Psychiatric:         Mood and Affect: Mood normal.          Additional Data:     Lab Results:  Results from last 7 days   Lab Units 02/07/24  1721   WBC Thousand/uL 11.49*   HEMOGLOBIN g/dL 15.6*   HEMATOCRIT % 47.7*   PLATELETS Thousands/uL 464*   NEUTROS PCT % 65   LYMPHS PCT % 22   MONOS PCT % 7   EOS PCT % 4     Results from last 7 days   Lab Units 02/07/24  1721   SODIUM mmol/L 137   POTASSIUM mmol/L 4.2   CHLORIDE mmol/L 102   CO2 mmol/L 27   BUN mg/dL 15   CREATININE mg/dL 0.84   ANION GAP mmol/L 8   CALCIUM mg/dL 9.2   ALBUMIN g/dL 4.3   TOTAL BILIRUBIN mg/dL 0.40   ALK PHOS U/L 61   ALT U/L 29   AST U/L 16   GLUCOSE RANDOM mg/dL 80     Results from last 7 days   Lab Units 02/07/24  1746   INR  1.00             Results from last 7 days   Lab Units 02/07/24  1721   LACTIC ACID mmol/L 1.3       Lines/Drains:  Invasive Devices       Peripheral Intravenous Line  Duration             Peripheral IV 02/07/24 Upper;Ventral (anterior);Left Arm <1 day              Line  Duration             Pump Device  -- days                        Imaging: Reviewed radiology reports from this admission including: chest CT scan and abdominal/pelvic CT  CT chest abdomen pelvis wo contrast   Final Result by Gareth Alvarez MD (02/07 1928)      1.  Intrahepatic and extrahepatic biliary ductal dilation, similar to prior MRI and greater than expected solely on the basis of a postcholecystectomy state. Repeat MRI may be performed to assess for underlying stricture or obstructing lesion.    Alternatively, ERCP/EUS may be performed if clinically indicated.   2.  Mild superior endplate deformity of the L3 vertebral  body, new from 7/18/2023 and favored to be degenerative in etiology.   3.  Bilateral nonobstructing renal calculi.      The study was marked in EPIC for immediate notification.         Workstation performed: [id number]             EKG and Other Studies Reviewed on Admission:   EKG: NSR. HR 90 bpm.    ** Please Note: This note has been constructed using a voice recognition system. **

## 2024-02-08 NOTE — OCCUPATIONAL THERAPY NOTE
Occupational Therapy         Patient Name: Celine Roa  Today's Date: 2/8/2024 02/08/24 1000   OT Last Visit   OT Visit Date 02/08/24   Note Type   Note type Cancelled Session   Cancel Reasons Refusal   Additional Comments Pt supine in bed upon OTR arrival, lights off, ice pack over face - refusing skilled OT evaluation due to headache. WIll continue to follow and provided skilled OT services as appropriate/able.         Conchita Maldonado

## 2024-02-08 NOTE — TELEPHONE ENCOUNTER
----- Message from Celine Roa sent at 2/8/2024 12:37 PM EST -----  Regarding: Celine Roa inpatient at [facility]   Contact: [phone]  I will need to reschedule my surgical consult or have it done while I am in inpatient at [facility].  Sorry for the inconvenience.  Sincerely Celine Roa  Cell: [phone]

## 2024-02-08 NOTE — UTILIZATION REVIEW
Initial Clinical Review    Observation 2/7 2039 changed to inpatient 2/9 1300. Pt requiring continued stay for MRI abdomen and MRCP.     Admission: Date/Time/Statement:   Admission Orders (From admission, onward)       Ordered        02/09/24 1300  Inpatient Admission  Once            02/07/24 2039  Place in Observation  Once                          Orders Placed This Encounter   Procedures    Inpatient Admission     Standing Status:   Standing     Number of Occurrences:   1     Order Specific Question:   Level of Care     Answer:   Med Surg [16]     Order Specific Question:   Estimated length of stay     Answer:   More than 2 Midnights     Order Specific Question:   Certification     Answer:   I certify that inpatient services are medically necessary for this patient for a duration of greater than two midnights. See H&P and MD Progress Notes for additional information about the patient's course of treatment.     ED Arrival Information       Expected   2/7/2024     Arrival   2/7/2024 16:04    Acuity   Urgent              Means of arrival   Walk-In    Escorted by   Spouse    Service   Hospitalist    Admission type   Emergency              Arrival complaint   Crohn's disease of colon with complication (HCC)             Chief Complaint   Patient presents with    Abdominal Pain     Patient has had worsening right upper abd pain since Saturday. Patient states she's very bloated and it got worse after she ate today.        Initial Presentation: 48 y.o. female with a PMH of mast cell disease, hypothyroidism, Crohn's, lupus, GERD, asthma who presents to the emergency room for evaluation of complaint of worsening abdominal pain.  Patient reports pain is right upper quadrant and has been having this pain for several months however over the past few days has been getting progressively worse.  Pain is associated with nausea, dry heaving.  Patient also reported that she had a fever on Monday. Plan: Observation for intractable  RUQ abd pain, Crohn's colitis, Lupus, mast call activation syndrome, hypothyroidism: NPO, IV fluids, pain management, GI consult, continue home meds.     2/8:    Internal medicine: NPO, IV fluids, GI consult, MRCP, continue levothyroxine    2/9 Observation changed to inpatient.    GI consult: Continue NPO for now, until after MRI/MRCP, IV fluids, serial abd exams, continue famotidine, antiemetics prn.     Internal medicine: Will keep NPO for now until MRI completed and then can trial clear liquids, IV fluids, pain management, Zofran prn.     ED Triage Vitals [02/07/24 1612]   Temperature Pulse Respirations Blood Pressure SpO2   97.6 °F (36.4 °C) (!) 107 18 133/90 98 %      Temp Source Heart Rate Source Patient Position - Orthostatic VS BP Location FiO2 (%)   Temporal Monitor Lying Left arm --      Pain Score       9          Wt Readings from Last 1 Encounters:   02/09/24 73.4 kg (161 lb 13.1 oz)     Additional Vital Signs:   Date/Time Temp Pulse Resp BP MAP (mmHg) SpO2 O2 Device   02/09/24 1100 -- -- -- -- -- -- None (Room air)   02/09/24 06:29:44 97.8 °F (36.6 °C) 86 18 122/70 87 98 % None (Room air)   02/08/24 23:19:29 98 °F (36.7 °C) 85 18 105/66 79 92 % None (Room air)   02/08/24 14:17:07 98.5 °F (36.9 °C) 89 19 104/70 81 95 % --   02/08/24 08:33:30 98.2 °F (36.8 °C) 87 18 123/91 102 97 % --     02/08/24 0306 -- 84 18 91/63 72 95 % --   02/07/24 23:39:24 97.9 °F (36.6 °C) 84 -- 92/62 72 96 % --   02/07/24 21:45:21 97.9 °F (36.6 °C) 80 18 94/53 67 91 % None (Room air)   02/07/24 2140 -- -- -- -- -- -- None (Room air)   02/07/24 2100 -- 81 18 -- -- -- --   02/07/24 2030 -- 86 18 94/60 70 97 % None (Room air)   02/07/24 2000 -- 84 13 107/57 76 97 % None (Room air)   02/07/24 1900 -- 80 16 122/70 90 96 % None (Room air)   02/07/24 1852 -- 91 18 96/57 71 97 % None (Room air)     Pertinent Labs/Diagnostic Test Results:   CT chest abdomen pelvis wo contrast   Final Result by Gareth Alvarez MD (02/07 1928)      1.   Intrahepatic and extrahepatic biliary ductal dilation, similar to prior MRI and greater than expected solely on the basis of a postcholecystectomy state. Repeat MRI may be performed to assess for underlying stricture or obstructing lesion.    Alternatively, ERCP/EUS may be performed if clinically indicated.   2.  Mild superior endplate deformity of the L3 vertebral body, new from 7/18/2023 and favored to be degenerative in etiology.   3.  Bilateral nonobstructing renal calculi.      The study was marked in EPIC for immediate notification.         Workstation performed: [id number]         MRI abdomen wo contrast and mrcp    (Results Pending)     Results from last 7 days   Lab Units 02/07/24  1721   SARS-COV-2  Negative     Results from last 7 days   Lab Units 02/09/24  0515 02/08/24  0552 02/07/24  1721   WBC Thousand/uL 6.28 7.45 11.49*   HEMOGLOBIN g/dL 13.9 14.1 15.6*   HEMATOCRIT % 42.9 43.9 47.7*   PLATELETS Thousands/uL 374 416* 464*   NEUTROS ABS Thousands/µL 3.19 3.79 7.58         Results from last 7 days   Lab Units 02/09/24  0515 02/08/24  0552 02/07/24  1721   SODIUM mmol/L 137 139 137   POTASSIUM mmol/L 4.0 4.1 4.2   CHLORIDE mmol/L 105 106 102   CO2 mmol/L 25 28 27   ANION GAP mmol/L 7 5 8   BUN mg/dL 7 8 15   CREATININE mg/dL 0.68 0.69 0.84   EGFR ml/min/1.73sq m 102 101 81   CALCIUM mg/dL 7.9* 7.9* 9.2   MAGNESIUM mg/dL  --  2.3 2.3   PHOSPHORUS mg/dL  --  2.6*  --      Results from last 7 days   Lab Units 02/08/24  0552 02/07/24  1721   AST U/L 13 16   ALT U/L 23 29   ALK PHOS U/L 48 61   TOTAL PROTEIN g/dL 5.8* 7.1   ALBUMIN g/dL 3.6 4.3   TOTAL BILIRUBIN mg/dL 0.49 0.40         Results from last 7 days   Lab Units 02/09/24  0515 02/08/24  0552 02/07/24  1721   GLUCOSE RANDOM mg/dL 71 74 80           Results from last 7 days   Lab Units 02/07/24  1721   HS TNI 0HR ng/L <2         Results from last 7 days   Lab Units 02/07/24  1746   PROTIME seconds 13.1   INR  1.00   PTT seconds 24             Results  from last 7 days   Lab Units 02/07/24  1721   LACTIC ACID mmol/L 1.3           Results from last 7 days   Lab Units 02/07/24  1721   LIPASE u/L 21     Results from last 7 days   Lab Units 02/07/24  1721   CRP mg/L 4.3*             Results from last 7 days   Lab Units 02/08/24  0313   CLARITY UA  Clear   COLOR UA  Yellow   SPEC GRAV UA  1.015   PH UA  6.5   GLUCOSE UA mg/dl Negative   KETONES UA mg/dl Negative   BLOOD UA  Negative   PROTEIN UA mg/dl Negative   NITRITE UA  Negative   BILIRUBIN UA  Negative   UROBILINOGEN UA E.U./dl 0.2   LEUKOCYTES UA  Negative     Results from last 7 days   Lab Units 02/07/24  1721   INFLUENZA A PCR  Negative   INFLUENZA B PCR  Negative   RSV PCR  Negative         ED Treatment:   Medication Administration from 02/07/2024 1535 to 02/07/2024 2115         Date/Time Order Dose Route Action     02/07/2024 1730 EST ondansetron (ZOFRAN) injection 4 mg 4 mg Intravenous Given     02/07/2024 1731 EST HYDROmorphone (DILAUDID) injection 0.5 mg 0.5 mg Intravenous Given     02/07/2024 1728 EST sodium chloride 0.9 % bolus 1,000 mL 1,000 mL Intravenous New Bag     02/07/2024 1853 EST HYDROmorphone (DILAUDID) injection 0.5 mg 0.5 mg Intravenous Given     02/07/2024 1907 EST sodium chloride 0.9 % bolus 1,000 mL 1,000 mL Intravenous New Bag     02/07/2024 1955 EST ondansetron (ZOFRAN) injection 4 mg 4 mg Intravenous Given          Past Medical History:   Diagnosis Date    Anemia 2007    Anxiety     Asthma     Bile duct stricture 2014    Sphincter of oddi dysfunction    Chronic kidney disease     Colon polyp 1998    Crohn's colitis (HCC)     Deep vein thrombosis (HCC)     Disease of thyroid gland     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 pt denies, documented in cardio note    Pancreatitis     sphinger of     Psychiatric disorder     RA (rheumatoid arthritis) (HCC)     Seizures (HCC)     Ulcerative colitis (HCC)     Visual impairment      Present on Admission:   Acquired hypothyroidism   Crohn's colitis (HCC)   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HCC)   Systemic lupus erythematosus, unspecified SLE type, unspecified organ involvement status (HCC)   Intractable right upper quadrant abdominal pain   Headache      Admitting Diagnosis: Leukocytosis [D72.829]  Nausea [R11.0]  Abdominal pain [R10.9]  Age/Sex: 48 y.o. female  Admission Orders:  Scheduled Medications:  vitamin C, 1,000 mg, Oral, BID  cholecalciferol, 1,000 Units, Oral, BID  cyanocobalamin, 100 mcg, Oral, Daily  famotidine, 40 mg, Oral, Daily  folic acid, 1 mg, Oral, Daily  hydrOXYzine HCL, 15 mg, Oral, Daily  levothyroxine, 75 mcg, Oral, Every Other Day  liothyronine, 5 mcg, Oral, Daily  loratadine, 10 mg, Oral, Daily  Magnesium Gluconate, 500 mg, Oral, BID  mirtazapine, 15 mg, Oral, HS  montelukast, 10 mg, Oral, HS  niacin, 100 mg, Oral, BID With Meals  predniSONE, 10 mg, Oral, Daily      Continuous IV Infusions:  sodium chloride, 75 mL/hr, Intravenous, Continuous      PRN Meds:  butalbital-acetaminophen-caffeine, 1 tablet, Oral, Q4H PRN  diphenhydrAMINE, 25 mg, Intravenous, Q4H PRN  HYDROmorphone, 0.5 mg, Intravenous, Q4H PRN  ondansetron, 4 mg, Intravenous, Q6H PRN        IP CONSULT TO GASTROENTEROLOGY    Network Utilization Review Department  ATTENTION: Please call with any questions or concerns to [phone] and carefully listen to the prompts so that you are directed to the right person. All voicemails are confidential.   For Discharge needs, contact Care Management DC Support Team at [phone] opt. [phone]  Send all requests for admission clinical reviews, approved or denied determinations and any other requests to dedicated fax  number below belonging to the campus where the patient is receiving treatment. List of dedicated fax numbers for the Facilities:  FACILITY NAME UR FAX NUMBER   ADMISSION DENIALS (Administrative/Medical Necessity) [phone]   DISCHARGE SUPPORT TEAM (NETWORK) [phone]   PARENT CHILD HEALTH (Maternity/NICU/Pediatrics) [phone]   [facility] [phone]   [facility] [phone]   [facility] [phone]   [facility] [phone]   [facility] [phone]   [facility] [phone]   [facility] [phone]   [facility] [phone]   [facility] [phone]   [facility] [phone]   [facility] [phone]   [facility] [phone]

## 2024-02-08 NOTE — ASSESSMENT & PLAN NOTE
Presented to the emergency room with complaints of abdominal pain  Patient reports pain in right upper quadrant without radiation.   Associated with nausea and dry heaving.  Patient reports subjective fever on Monday  Denies diarrhea  CT shows-  Intrahepatic and extrahepatic biliary ductal dilation, similar to prior MRI and greater than expected solely on the basis of a postcholecystectomy state. Repeat MRI may be performed to assess for underlying stricture or obstructing lesion. Alternatively, ERCP/EUS may be performed if clinically indicated.  Received Pain medication in the ER with little relief  ER Provider discussed case with GI.   Admit to observation  Will keep NPO for now  Continue IV fluids  Pain medication PRN  Zofran PRN  GI consult  AM labs  Supportive care

## 2024-02-08 NOTE — ASSESSMENT & PLAN NOTE
Presented to the emergency room with complaints of abdominal pain  Patient reports pain in right upper quadrant with radiation to right back  Associated with nausea and dry heaving, denies diarrhea.  Patient reports subjective fever on Monday  CT shows-  Intrahepatic and extrahepatic biliary ductal dilation, similar to prior MRI and greater than expected solely on the basis of a postcholecystectomy state. Repeat MRI may be performed to assess for underlying stricture or obstructing lesion. Alternatively, ERCP/EUS may be performed if clinically indicated.  Received Pain medication in the ER with little relief   Will keep NPO for now  Continue IV fluids  Pain medication PRN  Zofran PRN  GI consult  Will complete MRCP at this time

## 2024-02-08 NOTE — PROGRESS NOTES
[facility]  Progress Note  Name: Celine Roa I  MRN: [id number]  Unit/Bed#: 402-01 I Date of Admission: 2/7/2024   Date of Service: 2/8/2024 I Hospital Day: 0    Assessment/Plan   Crohn's colitis (HCC)  Assessment & Plan  History of Crohn's  No evidence of acute flare  Continue PTA medication  Continue GI follow-up    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HCC)  Assessment & Plan  Continue PTA medication  Continue outpatient hematology, immunology follow-up    Systemic lupus erythematosus, unspecified SLE type, unspecified organ involvement status (HCC)  Assessment & Plan  Continue PTA medication    Acquired hypothyroidism  Assessment & Plan  Continue levothyroxine    Headache  Assessment & Plan  Ongoing headache at this time  Patient requesting fioricet, will trial a one time dose    * Intractable right upper quadrant abdominal pain  Assessment & Plan  Presented to the emergency room with complaints of abdominal pain  Patient reports pain in right upper quadrant with radiation to right back  Associated with nausea and dry heaving, denies diarrhea.  Patient reports subjective fever on Monday  CT shows-  Intrahepatic and extrahepatic biliary ductal dilation, similar to prior MRI and greater than expected solely on the basis of a postcholecystectomy state. Repeat MRI may be performed to assess for underlying stricture or obstructing lesion. Alternatively, ERCP/EUS may be performed if clinically indicated.  Received Pain medication in the ER with little relief   Will keep NPO for now  Continue IV fluids  Pain medication PRN  Zofran PRN  GI consult  Will complete MRCP at this time               VTE Pharmacologic Prophylaxis:   Low Risk (Score 0-2) - Encourage Ambulation.    Mobility:   Basic Mobility Inpatient Raw Score: 20  JH-HLM Goal: 6: Walk 10 steps or more  JH-HLM Achieved: 2: Bed activities/Dependent transfer  HLM Goal NOT achieved. Continue with multidisciplinary rounding and encourage  appropriate mobility to improve upon HLM goals.    Patient Centered Rounds: I performed bedside rounds with nursing staff today.   Discussions with Specialists or Other Care Team Provider: case management, GI    Education and Discussions with Family / Patient: Patient declined call to .     Total Time Spent on Date of Encounter in care of patient: 45 mins. This time was spent on one or more of the following: performing physical exam; counseling and coordination of care; obtaining or reviewing history; documenting in the medical record; reviewing/ordering tests, medications or procedures; communicating with other healthcare professionals and discussing with patient's family/caregivers.    Current Length of Stay: 0 day(s)  Current Patient Status: Observation   Certification Statement: The patient will continue to require additional inpatient hospital stay due to pain management, MRCP, further work up, IVF  Discharge Plan: Anticipate discharge in 48-72 hrs to home.    Code Status: Level 1 - Full Code    Subjective:   Patient reports headache and has lights off in room with ice pack to head. States this relieved it somewhat but fioricet usually works best for her. She continues to have RUQ pain radiation to her back and shoulder    Per nursing was very tearful this AM, she seems less anxious on my examination    Objective:     Vitals:   Temp (24hrs), Av.9 °F (36.6 °C), Min:97.6 °F (36.4 °C), Max:98.2 °F (36.8 °C)    Temp:  [97.6 °F (36.4 °C)-98.2 °F (36.8 °C)] 98.2 °F (36.8 °C)  HR:  [] 87  Resp:  [13-18] 18  BP: ()/(53-91) 123/91  SpO2:  [91 %-98 %] 97 %  Body mass index is 26.21 kg/m².     Input and Output Summary (last 24 hours):     Intake/Output Summary (Last 24 hours) at 2024 1050  Last data filed at 2024 0859  Gross per 24 hour   Intake 2000 ml   Output 800 ml   Net 1200 ml       Physical Exam:   Physical Exam  Vitals and nursing note reviewed.   Constitutional:        General: She is not in acute distress.     Appearance: She is obese. She is ill-appearing.   Cardiovascular:      Rate and Rhythm: Normal rate and regular rhythm.      Pulses: Normal pulses.      Heart sounds: Normal heart sounds.   Pulmonary:      Effort: Pulmonary effort is normal.      Breath sounds: Normal breath sounds. No wheezing, rhonchi or rales.   Abdominal:      General: Bowel sounds are normal.      Tenderness: There is abdominal tenderness.   Musculoskeletal:      Right lower leg: No edema.      Left lower leg: No edema.   Neurological:      General: No focal deficit present.      Mental Status: She is alert. Mental status is at baseline.   Psychiatric:         Mood and Affect: Mood normal.         Behavior: Behavior normal.          Additional Data:     Labs:  Results from last 7 days   Lab Units 02/08/24  0552   WBC Thousand/uL 7.45   HEMOGLOBIN g/dL 14.1   HEMATOCRIT % 43.9   PLATELETS Thousands/uL 416*   NEUTROS PCT % 50   LYMPHS PCT % 34   MONOS PCT % 9   EOS PCT % 5     Results from last 7 days   Lab Units 02/08/24  0552   SODIUM mmol/L 139   POTASSIUM mmol/L 4.1   CHLORIDE mmol/L 106   CO2 mmol/L 28   BUN mg/dL 8   CREATININE mg/dL 0.69   ANION GAP mmol/L 5   CALCIUM mg/dL 7.9*   ALBUMIN g/dL 3.6   TOTAL BILIRUBIN mg/dL 0.49   ALK PHOS U/L 48   ALT U/L 23   AST U/L 13   GLUCOSE RANDOM mg/dL 74     Results from last 7 days   Lab Units 02/07/24  1746   INR  1.00             Results from last 7 days   Lab Units 02/07/24  1721   LACTIC ACID mmol/L 1.3       Lines/Drains:  Invasive Devices       Peripheral Intravenous Line  Duration             Peripheral IV 02/07/24 Upper;Ventral (anterior);Left Arm <1 day              Line  Duration             Pump Device  -- days                          Imaging: Reviewed radiology reports from this admission including: abdominal/pelvic CT    Recent Cultures (last 7 days):         Last 24 Hours Medication List:   Current Facility-Administered Medications    Medication Dose Route Frequency Provider Last Rate    vitamin C  1,000 mg Oral BID Gilberto Martinez PA-C      butalbital-acetaminophen-caffeine  1 tablet Oral Once Teresa Nuñez PA-C      cholecalciferol  1,000 Units Oral BID Gilberto Martinez PA-C      cyanocobalamin  100 mcg Oral Daily Gilberto Martinez PA-C      diphenhydrAMINE  25 mg Intravenous Q4H PRN Teresa Nuñez PA-C      famotidine  40 mg Oral Daily Gilberto Martinez PA-C      folic acid  1 mg Oral Daily Gilberto Martinez PA-C      HYDROmorphone  0.5 mg Intravenous Q4H PRN Teresa Nuñez PA-C      hydrOXYzine HCL  15 mg Oral Daily Gilberto Martinez PA-C      levothyroxine  75 mcg Oral Every Other Day Gilberto Martinez PA-C      liothyronine  5 mcg Oral Daily Gilberto Martinez PA-C      loratadine  10 mg Oral Daily Gilberto Martinez PA-C      magnesium gluconate  500 mg Oral BID Gilberto Martinez PA-C      mirtazapine  15 mg Oral HS Gilberto Martinez PA-C      montelukast  10 mg Oral HS Gilberto Martinez PA-C      niacin  100 mg Oral BID With Meals Gilberto Martinez PA-C      ondansetron  4 mg Intravenous Q6H PRN Gilberto Martinez PA-C      predniSONE  10 mg Oral Daily Gilberto Martinez, RANDEE      sodium chloride  75 mL/hr Intravenous Continuous Gilberto Martinez PA-C 75 mL/hr (02/08/24 1024)        Today, Patient Was Seen By: Teresa Nuñez PA-C    **Please Note: This note may have been constructed using a voice recognition system.**

## 2024-02-08 NOTE — CASE MANAGEMENT
Case Management Assessment & Discharge Planning Note    Patient name Celine Roa  Location /402-01 MRN [id number]  : 1975 Date 2024       Current Admission Date: 2024  Current Admission Diagnosis:Intractable right upper quadrant abdominal pain   Patient Active Problem List    Diagnosis Date Noted    Intractable right upper quadrant abdominal pain 2024    Common variable immunodeficiency (HCC) 2024    Diarrhea due to drug 2024    Iron deficiency 2023    Iron deficiency anemia due to chronic blood loss 2023    Depression due to physical illness 2023    Circadian rhythm sleep disorder 2023    Insomnia due to medical condition 2023    Recurrent major depressive disorder, in partial remission ([facility]) 2023    Medical clearance for psychiatric admission 2023    Seasonal allergies 2023    GERD without esophagitis 2023    Major depressive disorder with psychotic features ([facility]) 2023    Benzodiazepine misuse 2023    Passive suicidal ideations 2023    Encephalopathy chronic 2023    Systemic lupus erythematosus, unspecified SLE type, unspecified organ involvement status ([facility]) 2023    Bladder tumor 2023    Seizure ([facility])     Immunosuppression due to chronic steroid use  2022    Atrial tachycardia 2022    Nephrolithiasis 2021    Anaphylactic reaction 2021    Intractable nausea and vomiting 2021    Heart palpitations 2021    Inflammatory bowel disease 2021    Current chronic use of systemic steroids 2021    MS (multiple sclerosis) ([facility]) 2021    Migraine     Overweight (BMI 25.0-29.9) 2021    Anorexia 10/17/2020    Crohn's colitis ([facility]) 10/17/2020    Mild protein-calorie malnutrition ([facility]) 2019    Constipation 09/10/2019    Mass of left axilla 2019    Immunosuppressed status ([facility]) 2019    Proctitis 2019    Essential  (hemorrhagic) thrombocythemia (HCC)     Thrush 02/12/2019    Chronic back pain 02/12/2019    Primary localized osteoarthrosis of ankle and foot 06/04/2018    Fibromyalgia 12/28/2017    CHF (congestive heart failure) ([facility]) 11/29/2017    Meniere's disease 11/29/2017    Leukocytosis 09/10/2017    Hypomagnesemia 08/26/2017    Generalized anxiety disorder 08/26/2017    Vomiting and diarrhea 08/25/2017    SIRS (systemic inflammatory response syndrome) ([facility]) 08/25/2017    Vitamin D deficiency 08/25/2017    Hypokalemia 08/25/2017    Throat tightness 08/25/2017    Abdominal pain 08/25/2017    Headache 08/25/2017    Disorder of synovium 06/23/2017    Chondromalacia 05/12/2017    Chronic idiopathic urticaria 05/02/2017    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HCC) 05/19/2016    Acquired hypothyroidism 01/13/2014      LOS (days): 0  Geometric Mean LOS (GMLOS) (days):   Days to GMLOS:     OBJECTIVE:              Current admission status: Observation       Preferred Pharmacy:   [facility] [address]  [address]  [facility] [address]  Phone: [phone] Fax: [phone]    [facility] [address] [address]  Phone: [phone] Fax: [phone]    [place] [address]  Phone: [phone] Fax: [phone]    Primary Care Provider: Olaf Rhodes MD    Primary Insurance: [employer]  Secondary Insurance: MEDICARE    ASSESSMENT:  Active Health Care Proxies       Armando Roa Health Care Representative - Spouse   Primary Phone: [phone] (Mobile)  Home Phone: [phone]                 Advance Directives  Does patient have a Health Care POA?: No  Was patient offered paperwork?: Yes (declines)  Does patient currently have a Health Care decision maker?: Yes, please see Health Care Proxy section  Does patient have  Advance Directives?: No  Was patient offered paperwork?: Yes (declines)  Primary Contact: Armando Roa (Spouse)    [phone]         Readmission Root Cause  30 Day Readmission: No    Patient Information  Admitted from:: Home  Mental Status: Alert  During Assessment patient was accompanied by: Not accompanied during assessment  Assessment information provided by:: Patient  Primary Caregiver: Self  Support Systems: Spouse/significant other  [facility]  What city do you live in?: [place]  Home entry access options. Select all that apply.: Stairs  Number of steps to enter home.: 10  Type of Current Residence: [facility]  Living Arrangements: Lives w/ Spouse/significant other  Is patient a ?: No    Activities of Daily Living Prior to Admission  Functional Status: Independent  Completes ADLs independently?: Yes  Ambulates independently?: Yes  Does patient use assisted devices?: No  Does patient currently own DME?: No  Does patient have a history of Outpatient Therapy (PT/OT)?: No  Does the patient have a history of Short-Term Rehab?: No  Does patient have a history of HHC?: No  Does patient currently have HHC?: No         Patient Information Continued  Income Source: SSI/SSD  Does patient have prescription coverage?: Yes  Does patient receive dialysis treatments?: No  Does patient have a history of substance abuse?: No  Does patient have a history of Mental Health Diagnosis?: Yes (depression)  Is patient receiving treatment for mental health?: No. Patient declined treatment information.  Has patient received inpatient treatment related to mental health in the last 2 years?: Yes (July of 2023 [facility])         Means of Transportation  Means of Transport to [facility]:: Family transport      Housing Stability: Low Risk  (2/8/2024)    Housing Stability Vital Sign     Unable to Pay for Housing in the Last Year: No     Number of Places Lived in the Last Year: 1     Unstable Housing in the Last Year:  No   Food Insecurity: No Food Insecurity (2/8/2024)    Hunger Vital Sign     Worried About Running Out of Food in the Last Year: Never true     Ran Out of Food in the Last Year: Never true   Transportation Needs: No Transportation Needs (2/8/2024)    PRAPARE - Transportation     Lack of Transportation (Medical): No     Lack of Transportation (Non-Medical): No   Utilities: Not At Risk (2/8/2024)    C Utilities     Threatened with loss of utilities: No       DISCHARGE DETAILS:    Discharge planning discussed with:: patient        CM contacted family/caregiver?: No- see comments (declines)  Were Treatment Team discharge recommendations reviewed with patient/caregiver?: Yes  Did patient/caregiver verbalize understanding of patient care needs?: Yes  Were patient/caregiver advised of the risks associated with not following Treatment Team discharge recommendations?: Yes         Requested Home Health Care         Is the patient interested in HHC at discharge?: No    DME Referral Provided  Referral made for DME?: No         Would you like to participate in our [facility] service program?  : No - Declined       Discharge Destination Plan:: Home  Transport at Discharge : Family   Plans at this time are home on dc with OP follow up. CM will follow and assist in dc planning.

## 2024-02-08 NOTE — PLAN OF CARE
Problem: PAIN - ADULT  Goal: Verbalizes/displays adequate comfort level or baseline comfort level  Description: Interventions:  - Encourage patient to monitor pain and request assistance  - Assess pain using appropriate pain scale  - Administer analgesics based on type and severity of pain and evaluate response  - Implement non-pharmacological measures as appropriate and evaluate response  - Consider cultural and social influences on pain and pain management  - Notify physician/advanced practitioner if interventions unsuccessful or patient reports new pain  Outcome: Progressing     Problem: INFECTION - ADULT  Goal: Absence or prevention of progression during hospitalization  Description: INTERVENTIONS:  - Assess and monitor for signs and symptoms of infection  - Monitor lab/diagnostic results  - Monitor all insertion sites, i.e. indwelling lines  - Monitor endotracheal if appropriate and nasal secretions for changes in amount and color  - [place] appropriate cooling/warming therapies per order  - Administer medications as ordered  - Instruct and encourage patient and family to use good hand hygiene technique  - Identify and instruct in appropriate isolation precautions for identified infection/condition  Outcome: Progressing     Problem: SAFETY ADULT  Goal: Patient will remain free of falls  Description: INTERVENTIONS:  - Educate patient/family on patient safety including physical limitations  - Instruct patient to call for assistance with activity   - Consult OT/PT to assist with strengthening/mobility   - Keep Call bell within reach  - Keep bed low and locked with side rails adjusted as appropriate  - Keep care items and personal belongings within reach  - Initiate and maintain comfort rounds  - Make Fall Risk Sign visible to staff  - Offer Toileting every 4 Hours, in advance of need  - Initiate/Maintain bed alarm  - Obtain necessary fall risk management equipment  - Apply yellow socks and bracelet for high  fall risk patients  - Consider moving patient to room near nurses station  Outcome: Progressing  Goal: Maintain or return to baseline ADL function  Description: INTERVENTIONS:  -  Assess patient's ability to carry out ADLs; assess patient's baseline for ADL function and identify physical deficits which impact ability to perform ADLs (bathing, care of mouth/teeth, toileting, grooming, dressing, etc.)  - Assess/evaluate cause of self-care deficits   - Assess range of motion  - Assess patient's mobility; develop plan if impaired  - Assess patient's need for assistive devices and provide as appropriate  - Encourage maximum independence but intervene and supervise when necessary  - Involve family in performance of ADLs  - Assess for home care needs following discharge   - Consider OT consult to assist with ADL evaluation and planning for discharge  - Provide patient education as appropriate  Outcome: Progressing  Goal: Maintains/Returns to pre admission functional level  Description: INTERVENTIONS:  - Perform AM-PAC 6 Click Basic Mobility/ Daily Activity assessment daily.  - Set and communicate daily mobility goal to care team and patient/family/caregiver.   - Collaborate with rehabilitation services on mobility goals if consulted  - Perform Range of Motion 3 times a day.  - Reposition patient every 3 hours.  - Dangle patient 3 times a day  - Stand patient 3 times a day  - Ambulate patient 3 times a day  - Out of bed to chair 3 times a day   - Out of bed for meals 3 times a day  - Out of bed for toileting  - Record patient progress and toleration of activity level   Outcome: Progressing     Problem: Nutrition/Hydration-ADULT  Goal: Nutrient/Hydration intake appropriate for improving, restoring or maintaining nutritional needs  Description: Monitor and assess patient's nutrition/hydration status for malnutrition. Collaborate with interdisciplinary team and initiate plan and interventions as ordered.  Monitor patient's  weight and dietary intake as ordered or per policy. Utilize nutrition screening tool and intervene as necessary. Determine patient's food preferences and provide high-protein, high-caloric foods as appropriate.     INTERVENTIONS:  - Monitor oral intake, urinary output, labs, and treatment plans  - Assess nutrition and hydration status and recommend course of action  - Evaluate amount of meals eaten  - Assist patient with eating if necessary   - Allow adequate time for meals  - Recommend/ encourage appropriate diets, oral nutritional supplements, and vitamin/mineral supplements  - Order, calculate, and assess calorie counts as needed  - Recommend, monitor, and adjust tube feedings and TPN/PPN based on assessed needs  - Assess need for intravenous fluids  - Provide specific nutrition/hydration education as appropriate  - Include patient/family/caregiver in decisions related to nutrition  Outcome: Progressing     Problem: GASTROINTESTINAL - ADULT  Goal: Minimal or absence of nausea and/or vomiting  Description: INTERVENTIONS:  - Administer IV fluids if ordered to ensure adequate hydration  - Maintain NPO status until nausea and vomiting are resolved  - Nasogastric tube if ordered  - Administer ordered antiemetic medications as needed  - Provide nonpharmacologic comfort measures as appropriate  - Advance diet as tolerated, if ordered  - Consider nutrition services referral to assist patient with adequate nutrition and appropriate food choices  Outcome: Progressing  Goal: Maintains or returns to baseline bowel function  Description: INTERVENTIONS:  - Assess bowel function  - Encourage oral fluids to ensure adequate hydration  - Administer IV fluids if ordered to ensure adequate hydration  - Administer ordered medications as needed  - Encourage mobilization and activity  - Consider nutritional services referral to assist patient with adequate nutrition and appropriate food choices  Outcome: Progressing  Goal: Maintains  adequate nutritional intake  Description: INTERVENTIONS:  - Monitor percentage of each meal consumed  - Identify factors contributing to decreased intake, treat as appropriate  - Assist with meals as needed  - Monitor I&O, weight, and lab values if indicated  - Obtain nutrition services referral as needed  Outcome: Progressing     Problem: METABOLIC, FLUID AND ELECTROLYTES - ADULT  Goal: Electrolytes maintained within normal limits  Description: INTERVENTIONS:  - Monitor labs and assess patient for signs and symptoms of electrolyte imbalances  - Administer electrolyte replacement as ordered  - Monitor response to electrolyte replacements, including repeat lab results as appropriate  - Instruct patient on fluid and nutrition as appropriate  Outcome: Progressing  Goal: Fluid balance maintained  Description: INTERVENTIONS:  - Monitor labs   - Monitor I/O and WT  - Instruct patient on fluid and nutrition as appropriate  - Assess for signs & symptoms of volume excess or deficit  Outcome: Progressing     Problem: Prexisting or High Potential for Compromised Skin Integrity  Goal: Skin integrity is maintained or improved  Description: INTERVENTIONS:  - Identify patients at risk for skin breakdown  - Assess and monitor skin integrity  - Assess and monitor nutrition and hydration status  - Monitor labs   - Assess for incontinence   - Turn and reposition patient  - Assist with mobility/ambulation  - Relieve pressure over bony prominences  - Avoid friction and shearing  - Provide appropriate hygiene as needed including keeping skin clean and dry  - Evaluate need for skin moisturizer/barrier cream  - Collaborate with interdisciplinary team   - Patient/family teaching  - Consider wound care consult   Outcome: Progressing

## 2024-02-09 ENCOUNTER — APPOINTMENT (INPATIENT)
Dept: ULTRASOUND IMAGING | Facility: HOSPITAL | Age: 49
DRG: 445 | End: 2024-02-09
Payer: COMMERCIAL

## 2024-02-09 ENCOUNTER — APPOINTMENT (INPATIENT)
Dept: MRI IMAGING | Facility: HOSPITAL | Age: 49
DRG: 445 | End: 2024-02-09
Payer: COMMERCIAL

## 2024-02-09 ENCOUNTER — HOSPITAL ENCOUNTER (OUTPATIENT)
Dept: INFUSION CENTER | Facility: HOSPITAL | Age: 49
Discharge: HOME | End: 2024-02-09
Attending: INTERNAL MEDICINE

## 2024-02-09 DIAGNOSIS — Z12.31 ENCOUNTER FOR SCREENING MAMMOGRAM FOR MALIGNANT NEOPLASM OF BREAST: Primary | ICD-10-CM

## 2024-02-09 LAB
ANION GAP SERPL CALCULATED.3IONS-SCNC: 7 MMOL/L
BASOPHILS # BLD AUTO: 0.11 THOUSANDS/ÂΜL (ref 0–0.1)
BASOPHILS NFR BLD AUTO: 2 % (ref 0–1)
BUN SERPL-MCNC: 7 MG/DL (ref 5–25)
CALCIUM SERPL-MCNC: 7.9 MG/DL (ref 8.4–10.2)
CHLORIDE SERPL-SCNC: 105 MMOL/L (ref 96–108)
CO2 SERPL-SCNC: 25 MMOL/L (ref 21–32)
CREAT SERPL-MCNC: 0.68 MG/DL (ref 0.6–1.3)
EOSINOPHIL # BLD AUTO: 0.35 THOUSAND/ÂΜL (ref 0–0.61)
EOSINOPHIL NFR BLD AUTO: 6 % (ref 0–6)
ERYTHROCYTE [DISTWIDTH] IN BLOOD BY AUTOMATED COUNT: 14.6 % (ref 11.6–15.1)
GFR SERPL CREATININE-BSD FRML MDRD: 102 ML/MIN/1.73SQ M
GLUCOSE P FAST SERPL-MCNC: 71 MG/DL (ref 65–99)
GLUCOSE SERPL-MCNC: 71 MG/DL (ref 65–140)
HCT VFR BLD AUTO: 42.9 % (ref 34.8–46.1)
HGB BLD-MCNC: 13.9 G/DL (ref 11.5–15.4)
IMM GRANULOCYTES # BLD AUTO: 0.03 THOUSAND/UL (ref 0–0.2)
IMM GRANULOCYTES NFR BLD AUTO: 1 % (ref 0–2)
LYMPHOCYTES # BLD AUTO: 2.03 THOUSANDS/ÂΜL (ref 0.6–4.47)
LYMPHOCYTES NFR BLD AUTO: 32 % (ref 14–44)
MCH RBC QN AUTO: 32.7 PG (ref 26.8–34.3)
MCHC RBC AUTO-ENTMCNC: 32.4 G/DL (ref 31.4–37.4)
MCV RBC AUTO: 101 FL (ref 82–98)
MONOCYTES # BLD AUTO: 0.57 THOUSAND/ÂΜL (ref 0.17–1.22)
MONOCYTES NFR BLD AUTO: 9 % (ref 4–12)
NEUTROPHILS # BLD AUTO: 3.19 THOUSANDS/ÂΜL (ref 1.85–7.62)
NEUTS SEG NFR BLD AUTO: 50 % (ref 43–75)
NRBC BLD AUTO-RTO: 0 /100 WBCS
PLATELET # BLD AUTO: 374 THOUSANDS/UL (ref 149–390)
PMV BLD AUTO: 8.5 FL (ref 8.9–12.7)
POTASSIUM SERPL-SCNC: 4 MMOL/L (ref 3.5–5.3)
RBC # BLD AUTO: 4.25 MILLION/UL (ref 3.81–5.12)
SODIUM SERPL-SCNC: 137 MMOL/L (ref 135–147)
WBC # BLD AUTO: 6.28 THOUSAND/UL (ref 4.31–10.16)

## 2024-02-09 PROCEDURE — 85025 COMPLETE CBC W/AUTO DIFF WBC: CPT | Performed by: PHYSICIAN ASSISTANT

## 2024-02-09 PROCEDURE — 74181 MRI ABDOMEN W/O CONTRAST: CPT

## 2024-02-09 PROCEDURE — 80048 BASIC METABOLIC PNL TOTAL CA: CPT | Performed by: PHYSICIAN ASSISTANT

## 2024-02-09 PROCEDURE — 76775 US EXAM ABDO BACK WALL LIM: CPT

## 2024-02-09 PROCEDURE — 97166 OT EVAL MOD COMPLEX 45 MIN: CPT

## 2024-02-09 PROCEDURE — 99232 SBSQ HOSP IP/OBS MODERATE 35: CPT | Performed by: PHYSICIAN ASSISTANT

## 2024-02-09 PROCEDURE — 99223 1ST HOSP IP/OBS HIGH 75: CPT | Performed by: INTERNAL MEDICINE

## 2024-02-09 RX ORDER — HYDROMORPHONE HCL/PF 1 MG/ML
0.5 SYRINGE (ML) INJECTION EVERY 4 HOURS PRN
Status: DISCONTINUED | OUTPATIENT
Start: 2024-02-09 | End: 2024-02-13 | Stop reason: HOSPADM

## 2024-02-09 RX ORDER — BUTALBITAL, ACETAMINOPHEN AND CAFFEINE 50; 325; 40 MG/1; MG/1; MG/1
1 TABLET ORAL EVERY 4 HOURS PRN
Status: DISCONTINUED | OUTPATIENT
Start: 2024-02-09 | End: 2024-02-13 | Stop reason: HOSPADM

## 2024-02-09 RX ADMIN — SODIUM CHLORIDE 75 ML/HR: 0.9 INJECTION, SOLUTION INTRAVENOUS at 12:17

## 2024-02-09 RX ADMIN — HYDROMORPHONE HYDROCHLORIDE 0.5 MG: 1 INJECTION, SOLUTION INTRAMUSCULAR; INTRAVENOUS; SUBCUTANEOUS at 20:39

## 2024-02-09 RX ADMIN — DIPHENHYDRAMINE HYDROCHLORIDE 25 MG: 50 INJECTION, SOLUTION INTRAMUSCULAR; INTRAVENOUS at 22:34

## 2024-02-09 RX ADMIN — DIPHENHYDRAMINE HYDROCHLORIDE 25 MG: 50 INJECTION, SOLUTION INTRAMUSCULAR; INTRAVENOUS at 10:30

## 2024-02-09 RX ADMIN — BUTALBITAL, ACETAMINOPHEN, AND CAFFEINE 1 TABLET: 50; 325; 40 TABLET, COATED ORAL at 23:59

## 2024-02-09 RX ADMIN — HYDROMORPHONE HYDROCHLORIDE 0.5 MG: 1 INJECTION, SOLUTION INTRAMUSCULAR; INTRAVENOUS; SUBCUTANEOUS at 22:34

## 2024-02-09 RX ADMIN — HYDROMORPHONE HYDROCHLORIDE 0.5 MG: 1 INJECTION, SOLUTION INTRAMUSCULAR; INTRAVENOUS; SUBCUTANEOUS at 16:29

## 2024-02-09 RX ADMIN — HYDROMORPHONE HYDROCHLORIDE 0.5 MG: 1 INJECTION, SOLUTION INTRAMUSCULAR; INTRAVENOUS; SUBCUTANEOUS at 10:30

## 2024-02-09 RX ADMIN — DIPHENHYDRAMINE HYDROCHLORIDE 25 MG: 50 INJECTION, SOLUTION INTRAMUSCULAR; INTRAVENOUS at 14:33

## 2024-02-09 RX ADMIN — DIPHENHYDRAMINE HYDROCHLORIDE 25 MG: 50 INJECTION, SOLUTION INTRAMUSCULAR; INTRAVENOUS at 02:34

## 2024-02-09 RX ADMIN — DIPHENHYDRAMINE HYDROCHLORIDE 25 MG: 50 INJECTION, SOLUTION INTRAMUSCULAR; INTRAVENOUS at 18:27

## 2024-02-09 RX ADMIN — HYDROMORPHONE HYDROCHLORIDE 0.5 MG: 1 INJECTION, SOLUTION INTRAMUSCULAR; INTRAVENOUS; SUBCUTANEOUS at 14:33

## 2024-02-09 RX ADMIN — HYDROMORPHONE HYDROCHLORIDE 0.5 MG: 1 INJECTION, SOLUTION INTRAMUSCULAR; INTRAVENOUS; SUBCUTANEOUS at 06:39

## 2024-02-09 RX ADMIN — HYDROMORPHONE HYDROCHLORIDE 0.5 MG: 1 INJECTION, SOLUTION INTRAMUSCULAR; INTRAVENOUS; SUBCUTANEOUS at 02:33

## 2024-02-09 RX ADMIN — HYDROMORPHONE HYDROCHLORIDE 0.5 MG: 1 INJECTION, SOLUTION INTRAMUSCULAR; INTRAVENOUS; SUBCUTANEOUS at 18:27

## 2024-02-09 RX ADMIN — DIPHENHYDRAMINE HYDROCHLORIDE 25 MG: 50 INJECTION, SOLUTION INTRAMUSCULAR; INTRAVENOUS at 06:45

## 2024-02-09 RX ADMIN — ONDANSETRON 4 MG: 2 INJECTION INTRAMUSCULAR; INTRAVENOUS at 15:58

## 2024-02-09 RX ADMIN — ONDANSETRON 4 MG: 2 INJECTION INTRAMUSCULAR; INTRAVENOUS at 08:41

## 2024-02-09 RX ADMIN — BUTALBITAL, ACETAMINOPHEN, AND CAFFEINE 1 TABLET: 50; 325; 40 TABLET, COATED ORAL at 09:34

## 2024-02-09 NOTE — CONSULTS
Consultation -  [facility]  Celine Roa 48 y.o. female MRN: [id number]  Unit/Bed#: 402-01 Encounter: [id number]        Inpatient consult to gastroenterology  Consult performed by: JONATHAN Torres  Consult ordered by: Teresa Nuñez PA-C          Reason for Consult / Principal Problem:     Intractable right upper quadrant abdominal pain  Nausea      ASSESSMENT AND PLAN:     The patient is a 48 y.o. female with a past medical history of mast cell disease, hypothyroidism, Crohn's, lupus, GERD, asthma, anemia, CKD, lactose intolerance, migraines, pancreatitis, rheumatoid arthritis with a history of an appendectomy, cholecystectomy, ERCP, hysterectomy, and abdominal surgery to remove adhesions,who is being seen for a consultation today for evaluation of her intractable right upper quadrant abdominal pain and nausea with CT evidence of intra and extrahepatic biliary ductal dilatation.    Serology: (2/9/24): Calcium 7.9, , MPV 8.5.    Exam:  Oral mucosa normal upon visual inspection, without any sores, lesions, or ulcerations. Sclera without icterus and benign. Lung sounds clear to auscultation b/l. Normal S1 & S2 upon exam. Abdomen is soft, nondistended, with mild tenderness noted upon exam in the right upper quadrant without any significant guarding or rebound tenderness and upon exam, with very faint bowel sounds x 4.  No edema noted of the b/l lower extremities upon exam today. Skin is non-icteric.       Intractable right upper quadrant abdominal pain  Nausea  CT evidence of intra and extrahepatic biliary ductal dilatation  Crohn's colitis  Mast cell activation syndrome  Systemic lupus    After a discussion with the attending, we will await the results of the MRI/MRCP to further evaluate the bile duct changes as it is concerning for stricture and possible future need for an ERCP/EUS, which would need to be done either outpatient once the patient is stable and able to be discharged or the  patient would need to be transferred down to [place] for the procedure.    Continue NPO for now, until after MRI/MRCP, as ordered.  Continue IV hydration.    Continue to maintain a large bore IV.  Continue with serial abdominal exams.    Continue Famotidine as ordered.   Continue PRN antiemetics as ordered.   Continue rest of medications as per primary team.   Continue supportive care.     GI will continue to follow.    ______________________________________________________________________    HPI: Patient is a 48 y.o. female with a past medical history of mast cell disease, hypothyroidism, Crohn's, lupus, GERD, asthma, anemia, CKD, lactose intolerance, migraines, pancreatitis, rheumatoid arthritis with a history of an appendectomy, cholecystectomy, ERCP, hysterectomy, and abdominal surgery to remove adhesions, who presented to the ER on February 7, 2024 with worsening right upper quadrant abdominal pain, nausea, vomiting, and dehydration.  The patient reports that prior to admission that this past Saturday she started with worsening right upper quadrant abdominal pain, nausea, and dry heaving without any bloody emesis.  She reports that she tried to manage it until Wednesday when she tried to eat a bowl of cereal and the pain became unmanageable and she then presented to the ER for evaluation and treatment, although she reports that he has referred her for evaluation with Dr. Perez at [place] with regards to her continued and possible motility issues.  Recently, as she is well-known to our service, and typically follows up with Dr. Levine for her IBD management, she was started on a prednisone taper and is currently down to 10 mg daily because she was having issues with her bowel movements and regulation and once she started the prednisone the bowel movement significantly improved and started to normalize and are less painful.  She denied any significant melena or blood in her stool.  Upon admission they did  proceed with a CT scan of the chest abdomen pelvis without contrast that showed intrahepatic and extrahepatic biliary ductal dilation, similar to prior MRI and greater than expected solely on the basis of a postcholecystectomy state. Repeat MRI may be performed to assess for underlying stricture or obstructing lesion. Alternatively, ERCP/EUS may be performed if clinically indicated.  The MRI has been ordered but has not yet been completed.    The patient reports that they are currently experiencing nausea, decreased appetite, but, denies any reflux, dysphagia, vomiting, or unplanned weight loss.    The patient reports that they are currently experiencing chronic bloating, but, denies any diarrhea, constipation, straining, melena or bloody stools. Last BM: Wednesday. Flatus: Minimal.    The patient is currently is currently n.p.o., sips with meds.    Serology Upon Admission: (2/7/24) WBCs 11.49 H&H 15.6/47.7, MCV 99, platelets 464, MPV 8.7.    Tobacco/Vaping: Denied  ETOH: Denied Hx of ETOH: Denied  Kratom: Denied  Marijuana: Denied  Illicit Drug Use: Denied Hx of Illicit Drug Use: Denied    Meds: Famotidine 40 mg daily and Zofran as needed for nausea.  NSAID Use: Denied  Tylenol Use: Denied    Imaging: (2/7/24) CT scan of the chest abdomen pelvis without contrast:  Intrahepatic and extrahepatic biliary ductal dilation, similar to prior MRI and greater than expected solely on the basis of a postcholecystectomy state. Repeat MRI may be performed to assess for underlying stricture or obstructing lesion.   Alternatively, ERCP/EUS may be performed if clinically indicated.    Endoscopy History: EGD: (9/20/23): Normal. Path: Normal.     COLONOSCOPY: (9/20/23): Flex Sig: Mild abnormal mucosa consistent with chronic disease and sigmoid colon with pathology confirming mild chronic inflammation.    REVIEW OF SYSTEMS:    CONSTITUTIONAL: Denies any fever, chills, rigors, and weight loss.  HEENT: No earache or tinnitus. Denies  hearing loss or visual disturbances.  CARDIOVASCULAR: No chest pain or palpitations.   RESPIRATORY: Denies any cough, hemoptysis, shortness of breath or dyspnea on exertion.  GASTROINTESTINAL: As noted in the History of Present Illness.   GENITOURINARY: No problems with urination. Denies any hematuria or dysuria.  NEUROLOGIC: No dizziness or vertigo, denies headaches.   MUSCULOSKELETAL: Denies any muscle or joint pain.   SKIN: Denies skin rashes or itching.   ENDOCRINE: Denies excessive thirst. Denies intolerance to heat or cold.  PSYCHOSOCIAL: Denies depression or anxiety. Denies any recent memory loss.       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 pt denies, documented in cardio note    Pancreatitis     sphinger of     Psychiatric disorder     RA (rheumatoid arthritis) (HCC)     Seizures (HCC)     Ulcerative colitis (HCC)     Visual impairment      Past Surgical History:   Procedure Laterality Date    ABDOMINAL SURGERY  2017    APPENDECTOMY      CHOLECYSTECTOMY      COLONOSCOPY  2019    CYSTOSCOPY N/A 6/8/2023    Procedure: CYSTOSCOPY, mini TURBT with fulgeration;  Surgeon: Tee Bruno MD;  Location: [facility];  Service: Urology    EGD AND COLONOSCOPY N/A 09/12/2017    Procedure: EGD AND COLONOSCOPY;  Surgeon: Tommy Oliver MD;  Location:  GI LAB;  Service: Gastroenterology    ERCP N/A 09/15/2017     Procedure: ENDOSCOPIC RETROGRADE CHOLANGIOPANCREATOGRAPHY (ERCP);  Surgeon: Dre Soto MD;  Location: [facility];  Service: Gastroenterology    HYSTERECTOMY      KNEE SURGERY Right     LAPAROSCOPIC ENDOMETRIOSIS FULGURATION      ORIF ANKLE FRACTURE Left     WI ARTHRS KNE SURG W/MENISCECTOMY MED/LAT W/SHVG Left 05/12/2017    Procedure: POSSIBLE MEDIAL MENISECTOMY;  Surgeon: Kristian Avila MD;  Location: [facility];  Service: Orthopedics    WI ARTHRS KNEE DEBRIDEMENT/SHAVING ARTCLR CRTLG Left 05/12/2017    Procedure: KNEE ARTHROSCOPY EVALUATION, CHONDROPLASTY;  Surgeon: Kristian Avila MD;  Location: [facility];  Service: Orthopedics    WI LAPS ABD PRTM&OMENTUM DX W/WO SPEC BR/WA SPX N/A 12/12/2017    Procedure: LAPAROSCOPY DIAGNOSTIC  WITH LYSIS OF ADHESIONS;  Surgeon: Olaf John DO;  Location:  MAIN OR;  Service: General    UPPER GASTROINTESTINAL ENDOSCOPY  2019     Social History   Social History     Substance and Sexual Activity   Alcohol Use Never     Social History     Substance and Sexual Activity   Drug Use Never     Social History     Tobacco Use   Smoking Status Never   Smokeless Tobacco Never     Family History   Problem Relation Age of Onset    Ulcerative colitis Mother     Arthritis Mother     Colon polyps Mother     Heart failure Father     Neuropathy Father     Macular degeneration Father     Diabetes Father     Early death Father     Vision loss Father     Asthma Daughter     Hypothyroidism Daughter     Heart disease Maternal Grandmother     Arthritis Maternal Grandmother     No Known Problems Maternal Grandfather     Arthritis Paternal Grandmother     Macular degeneration Paternal Grandmother     Vision loss Paternal Grandmother     Lymphoma Paternal Grandfather     Cancer Paternal Grandfather     Early death Paternal Grandfather     Breast cancer Maternal Aunt     Cancer Maternal Aunt     Miscarriages / Stillbirths Maternal Aunt     Heart failure Paternal Aunt     Heart failure Paternal Aunt      "Irritable bowel syndrome Paternal Aunt     Breast cancer Paternal Aunt 54    Breast cancer additional onset Paternal Aunt 58    Hypothyroidism Paternal Aunt     Cancer Paternal Aunt     No Known Problems Son     No Known Problems Son     Kidney disease Brother     Depression Paternal Uncle     Early death Paternal Uncle        Meds/Allergies     Medications Prior to Admission   Medication    Ascorbic Acid (vitamin C) 1000 MG tablet    cetirizine (ZyrTEC) 10 mg tablet    cholecalciferol (VITAMIN D3) 1,000 units tablet    cyanocobalamin (VITAMIN B-12) 100 mcg tablet    diphenhydrAMINE (BENADRYL) 50 mg capsule    famotidine (PEPCID) 40 MG tablet    folic acid (KP Folic Acid) 1 mg tablet    hydrOXYzine HCL (ATARAX) 25 mg tablet    levothyroxine 100 mcg tablet    liothyronine (CYTOMEL) 5 mcg tablet    magnesium gluconate (MAGONATE) 500 mg tablet    Melatonin 3 MG SUBL    methotrexate 50 MG/2ML injection    mirtazapine (REMERON) 15 mg tablet    montelukast (SINGULAIR) 10 mg tablet    predniSONE 20 mg tablet    Riboflavin (Vitamin B-2) 100 MG TABS    B-D 3CC LUER-SHAMEKA SYR 25GX1\" 25G X 1\" 3 ML MISC    butalbital-acetaminophen-caffeine (FIORICET,ESGIC) -40 mg per tablet    Cholecalciferol 10 MCG /0.025ML LIQD    diclofenac (VOLTAREN) 75 mg EC tablet    EpiPen 2-Malachi 0.3 MG/0.3ML SOAJ    Lactobacillus (PROBIOTIC ACIDOPHILUS PO)    Lactobacillus-Inulin ([facility]) CAPS    levalbuterol (XOPENEX HFA) 45 mcg/act inhaler    melatonin 3 mg    methotrexate 50 MG/2ML injection    NEEDLE, DISP, 27 G (B-D DISP NEEDLE 48HO2-2/4\") 27G X 1-1/4\" MISC    NON FORMULARY    nystatin (MYCOSTATIN) 500,000 units/5 mL suspension    omalizumab (XOLAIR) 150 mg    pantoprazole (PROTONIX) 40 mg tablet    Riboflavin (VITAMIN B-2 PO)    risankizumab-rzaa (Skyrizi) 360 MG/2.4ML SOCT    scopolamine (TRANSDERM-SCOP) 1 mg/3 days TD 72 hr patch    TechLite Lancets MISC    traMADol (Ultram) 50 mg tablet     Current Facility-Administered " Medications   Medication Dose Route Frequency    ascorbic acid (VITAMIN C) tablet 1,000 mg  1,000 mg Oral BID    cholecalciferol (VITAMIN D3) tablet 1,000 Units  1,000 Units Oral BID    cyanocobalamin (VITAMIN B-12) tablet 100 mcg  100 mcg Oral Daily    diphenhydrAMINE (BENADRYL) injection 25 mg  25 mg Intravenous Q4H PRN    famotidine (PEPCID) tablet 40 mg  40 mg Oral Daily    folic acid (FOLVITE) tablet 1 mg  1 mg Oral Daily    HYDROmorphone (DILAUDID) injection 0.5 mg  0.5 mg Intravenous Q4H PRN    hydrOXYzine HCL (ATARAX) tablet 15 mg  15 mg Oral Daily    levothyroxine tablet 75 mcg  75 mcg Oral Every Other Day    liothyronine (CYTOMEL) tablet 5 mcg  5 mcg Oral Daily    loratadine (CLARITIN) tablet 10 mg  10 mg Oral Daily    Magnesium Gluconate TABS 500 mg  500 mg Oral BID    mirtazapine (REMERON) tablet 15 mg  15 mg Oral HS    montelukast (SINGULAIR) tablet 10 mg  10 mg Oral HS    niacin tablet 100 mg  100 mg Oral BID With Meals    ondansetron (ZOFRAN) injection 4 mg  4 mg Intravenous Q6H PRN    predniSONE tablet 10 mg  10 mg Oral Daily    sodium chloride 0.9 % infusion  75 mL/hr Intravenous Continuous       Allergies   Allergen Reactions    Aimovig [Erenumab-Aooe] Anaphylaxis    Amitriptyline Anaphylaxis     According to the patient she had nphylaxis    Aspergillus Allergy Skin Test Other (See Comments), Hives and Swelling    Azathioprine Other (See Comments) and Anaphylaxis     pancreatitis  According to patient she needed Epipen    Banana - Food Allergy Anaphylaxis, Itching, Swelling and Tongue Swelling    Buspar [Buspirone] Hives and Shortness Of Breath    Citric Acid-Polysorbate 80 Abdominal Pain, Anaphylaxis, Anxiety, Bradycardia, Diarrhea, Fatigue, GI Intolerance, Headache, Hives, Hyperactivity, Itching, Lip Swelling, Nausea Only, Palpitations, Rash, Shortness Of Breath, Tachycardia, Throat Swelling, Tongue Swelling and Vomiting    Corn Dextrin Anaphylaxis     Any corn based products    Eggs Or  "Egg-Derived Products - Food Allergy Anaphylaxis    Epinephrine Anaphylaxis    Erenumab Anaphylaxis     patient sent portal message stating she had anaphylaxis  patient sent portal message stating she had anaphylaxis      Gadolinium Abdominal Pain, Anaphylaxis, Anxiety, Confusion, Delirium, Dizziness, Eye Swelling, Fatigue, GI Intolerance, Headache, Hives, Irritability, Itching, Lip Swelling, Nausea Only, Palpitations, Photosensitivity, Rash, Seizures, Shortness Of Breath, Swelling, Syncope, Throat Swelling, Tongue Swelling, Tremor, Visual Disturbance and Vomiting    Gelatin - Food Allergy Anaphylaxis    Heparin Hives     Painful welts     Lactated Ringers Shortness Of Breath     Pt questions this allergy, pt has received LR multiple times without reaction    Lavender Oil Anaphylaxis     Other reaction(s): anaphalxis    Malto Dextrin [Dextrin] Anaphylaxis    Other Anaphylaxis     Gel caps, maltodextrose, dextrose,grapes, lavender     Penicillium Notatum Shortness Of Breath and Swelling    Red Dye - Food Allergy Anaphylaxis and Other (See Comments)     intolerance    Sumatriptan Anaphylaxis     Bradycardia, sob, back pressure, head pain,throat tightness  According to the patient she had anphylaxis    Ustekinumab Anaphylaxis    Contrast [Iodinated Contrast Media] Other (See Comments)     Pt states needs to be premedicated prior to injection    Corn Oil - Food Allergy - Food Allergy Hives    Humira [Adalimumab] Other (See Comments)     \"I develop drug induced lupus\"    Ibuprofen Hives and Throat Swelling    Polyethylene Glycol Diarrhea and Vomiting    Remicade [Infliximab] Other (See Comments)     \"I develop drug induced lupus\"    Soy Allergy - Food Allergy Other (See Comments)    Sulfa Antibiotics Hives and Other (See Comments)    Sulfate Hives    Sulfites - Food Allergy Hives    Sweet Corn Extract Skin Test - Food Allergy Hives and Itching    Chlorhexidine Itching    Freederm Adhesive Remover [New Skin] Rash    " "Histamine Hives, Rash and Other (See Comments)     Intolerance      Wound Dressings Rash           Objective     Blood pressure 122/70, pulse 86, temperature 97.8 °F (36.6 °C), temperature source Oral, resp. rate 18, height 5' 5.25\" (1.657 m), weight 73.4 kg (161 lb 13.1 oz), SpO2 98%, not currently breastfeeding. Body mass index is 26.72 kg/m².      Intake/Output Summary (Last 24 hours) at 2/9/2024 0830  Last data filed at 2/8/2024 1749  Gross per 24 hour   Intake 0 ml   Output --   Net 0 ml         PHYSICAL EXAM:      General Appearance:   Alert, cooperative, no distress   HEENT:   Normocephalic, atraumatic, anicteric.     Neck:  Supple, symmetrical, trachea midline   Lungs:   Clear to auscultation bilaterally; no rales, rhonchi or wheezing; respirations unlabored    Heart::   Regular rate and rhythm; no murmur, rub, or gallop.   Abdomen:   Soft, non-tender, non-distended; normal bowel sounds; no masses, no organomegaly    Genitalia:   Deferred    Rectal:   Deferred    Extremities:  No cyanosis, clubbing or edema    Pulses:  2+ and symmetric all extremities    Skin:  No jaundice, rashes, or lesions    Lymph nodes:  No palpable cervical lymphadenopathy        Lab Results:   Admission on 02/07/2024   Component Date Value    WBC 02/07/2024 11.49 (H)     RBC 02/07/2024 4.83     Hemoglobin 02/07/2024 15.6 (H)     Hematocrit 02/07/2024 47.7 (H)     MCV 02/07/2024 99 (H)     MCH 02/07/2024 32.3     MCHC 02/07/2024 32.7     RDW 02/07/2024 14.7     MPV 02/07/2024 8.7 (L)     Platelets 02/07/2024 464 (H)     nRBC 02/07/2024 0     Neutrophils Relative 02/07/2024 65     Immat GRANS % 02/07/2024 1     Lymphocytes Relative 02/07/2024 22     Monocytes Relative 02/07/2024 7     Eosinophils Relative 02/07/2024 4     Basophils Relative 02/07/2024 1     Neutrophils Absolute 02/07/2024 7.58     Immature Grans Absolute 02/07/2024 0.06     Lymphocytes Absolute 02/07/2024 2.50     Monocytes Absolute 02/07/2024 0.79     Eosinophils " Absolute 02/07/2024 0.41     Basophils Absolute 02/07/2024 0.15 (H)     Sodium 02/07/2024 137     Potassium 02/07/2024 4.2     Chloride 02/07/2024 102     CO2 02/07/2024 27     ANION GAP 02/07/2024 8     BUN 02/07/2024 15     Creatinine 02/07/2024 0.84     Glucose 02/07/2024 80     Calcium 02/07/2024 9.2     AST 02/07/2024 16     ALT 02/07/2024 29     Alkaline Phosphatase 02/07/2024 61     Total Protein 02/07/2024 7.1     Albumin 02/07/2024 4.3     Total Bilirubin 02/07/2024 0.40     eGFR 02/07/2024 81     Protime 02/07/2024 13.1     INR 02/07/2024 1.00     PTT 02/07/2024 24     Lipase 02/07/2024 21     Magnesium 02/07/2024 2.3     hs TnI 0hr 02/07/2024 <2     LACTIC ACID 02/07/2024 1.3     Color, UA 02/08/2024 Yellow     Clarity, UA 02/08/2024 Clear     Specific Gravity, UA 02/08/2024 1.015     pH, UA 02/08/2024 6.5     Leukocytes, UA 02/08/2024 Negative     Nitrite, UA 02/08/2024 Negative     Protein, UA 02/08/2024 Negative     Glucose, UA 02/08/2024 Negative     Ketones, UA 02/08/2024 Negative     Urobilinogen, UA 02/08/2024 0.2     Bilirubin, UA 02/08/2024 Negative     Occult Blood, UA 02/08/2024 Negative     SARS-CoV-2 02/07/2024 Negative     INFLUENZA A PCR 02/07/2024 Negative     INFLUENZA B PCR 02/07/2024 Negative     RSV PCR 02/07/2024 Negative     CRP 02/07/2024 4.3 (H)     Sodium 02/08/2024 139     Potassium 02/08/2024 4.1     Chloride 02/08/2024 106     CO2 02/08/2024 28     ANION GAP 02/08/2024 5     BUN 02/08/2024 8     Creatinine 02/08/2024 0.69     Glucose 02/08/2024 74     Calcium 02/08/2024 7.9 (L)     AST 02/08/2024 13     ALT 02/08/2024 23     Alkaline Phosphatase 02/08/2024 48     Total Protein 02/08/2024 5.8 (L)     Albumin 02/08/2024 3.6     Total Bilirubin 02/08/2024 0.49     eGFR 02/08/2024 101     Magnesium 02/08/2024 2.3     Phosphorus 02/08/2024 2.6 (L)     WBC 02/08/2024 7.45     RBC 02/08/2024 4.32     Hemoglobin 02/08/2024 14.1     Hematocrit 02/08/2024 43.9     MCV 02/08/2024 102 (H)      MCH 02/08/2024 32.6     MCHC 02/08/2024 32.1     RDW 02/08/2024 14.9     MPV 02/08/2024 8.6 (L)     Platelets 02/08/2024 416 (H)     nRBC 02/08/2024 0     Neutrophils Relative 02/08/2024 50     Immat GRANS % 02/08/2024 1     Lymphocytes Relative 02/08/2024 34     Monocytes Relative 02/08/2024 9     Eosinophils Relative 02/08/2024 5     Basophils Relative 02/08/2024 1     Neutrophils Absolute 02/08/2024 3.79     Immature Grans Absolute 02/08/2024 0.06     Lymphocytes Absolute 02/08/2024 2.51     Monocytes Absolute 02/08/2024 0.63     Eosinophils Absolute 02/08/2024 0.37     Basophils Absolute 02/08/2024 0.09     WBC 02/09/2024 6.28     RBC 02/09/2024 4.25     Hemoglobin 02/09/2024 13.9     Hematocrit 02/09/2024 42.9     MCV 02/09/2024 101 (H)     MCH 02/09/2024 32.7     MCHC 02/09/2024 32.4     RDW 02/09/2024 14.6     MPV 02/09/2024 8.5 (L)     Platelets 02/09/2024 374     nRBC 02/09/2024 0     Neutrophils Relative 02/09/2024 50     Immat GRANS % 02/09/2024 1     Lymphocytes Relative 02/09/2024 32     Monocytes Relative 02/09/2024 9     Eosinophils Relative 02/09/2024 6     Basophils Relative 02/09/2024 2 (H)     Neutrophils Absolute 02/09/2024 3.19     Immature Grans Absolute 02/09/2024 0.03     Lymphocytes Absolute 02/09/2024 2.03     Monocytes Absolute 02/09/2024 0.57     Eosinophils Absolute 02/09/2024 0.35     Basophils Absolute 02/09/2024 0.11 (H)     Sodium 02/09/2024 137     Potassium 02/09/2024 4.0     Chloride 02/09/2024 105     CO2 02/09/2024 25     ANION GAP 02/09/2024 7     BUN 02/09/2024 7     Creatinine 02/09/2024 0.68     Glucose 02/09/2024 71     Glucose, Fasting 02/09/2024 71     Calcium 02/09/2024 7.9 (L)     eGFR 02/09/2024 102        Imaging Studies: I have personally reviewed pertinent imaging studies.

## 2024-02-09 NOTE — PLAN OF CARE
Problem: PAIN - ADULT  Goal: Verbalizes/displays adequate comfort level or baseline comfort level  Description: Interventions:  - Encourage patient to monitor pain and request assistance  - Assess pain using appropriate pain scale  - Administer analgesics based on type and severity of pain and evaluate response  - Implement non-pharmacological measures as appropriate and evaluate response  - Consider cultural and social influences on pain and pain management  - Notify physician/advanced practitioner if interventions unsuccessful or patient reports new pain  Outcome: Progressing     Problem: INFECTION - ADULT  Goal: Absence or prevention of progression during hospitalization  Description: INTERVENTIONS:  - Assess and monitor for signs and symptoms of infection  - Monitor lab/diagnostic results  - Monitor all insertion sites, i.e. indwelling lines  - Monitor endotracheal if appropriate and nasal secretions for changes in amount and color  - [place] appropriate cooling/warming therapies per order  - Administer medications as ordered  - Instruct and encourage patient and family to use good hand hygiene technique  - Identify and instruct in appropriate isolation precautions for identified infection/condition  Outcome: Progressing     Problem: SAFETY ADULT  Goal: Patient will remain free of falls  Description: INTERVENTIONS:  - Educate patient/family on patient safety including physical limitations  - Instruct patient to call for assistance with activity   - Consult OT/PT to assist with strengthening/mobility   - Keep Call bell within reach  - Keep bed low and locked with side rails adjusted as appropriate  - Keep care items and personal belongings within reach  - Initiate and maintain comfort rounds  - Make Fall Risk Sign visible to staff  - Offer Toileting every 4 Hours, in advance of need  - Initiate/Maintain bed alarm  - Obtain necessary fall risk management equipment  - Apply yellow socks and bracelet for high  fall risk patients  - Consider moving patient to room near nurses station  Outcome: Progressing  Goal: Maintain or return to baseline ADL function  Description: INTERVENTIONS:  -  Assess patient's ability to carry out ADLs; assess patient's baseline for ADL function and identify physical deficits which impact ability to perform ADLs (bathing, care of mouth/teeth, toileting, grooming, dressing, etc.)  - Assess/evaluate cause of self-care deficits   - Assess range of motion  - Assess patient's mobility; develop plan if impaired  - Assess patient's need for assistive devices and provide as appropriate  - Encourage maximum independence but intervene and supervise when necessary  - Involve family in performance of ADLs  - Assess for home care needs following discharge   - Consider OT consult to assist with ADL evaluation and planning for discharge  - Provide patient education as appropriate  Outcome: Progressing  Goal: Maintains/Returns to pre admission functional level  Description: INTERVENTIONS:  - Perform AM-PAC 6 Click Basic Mobility/ Daily Activity assessment daily.  - Set and communicate daily mobility goal to care team and patient/family/caregiver.   - Collaborate with rehabilitation services on mobility goals if consulted  - Perform Range of Motion 2 times a day.  - Reposition patient every 2 hours.  - Dangle patient 2 times a day  - Stand patient 2 times a day  - Ambulate patient 2 times a day  - Out of bed to chair 2 times a day   - Out of bed for meals 2 times a day  - Out of bed for toileting  - Record patient progress and toleration of activity level   Outcome: Progressing     Problem: Nutrition/Hydration-ADULT  Goal: Nutrient/Hydration intake appropriate for improving, restoring or maintaining nutritional needs  Description: Monitor and assess patient's nutrition/hydration status for malnutrition. Collaborate with interdisciplinary team and initiate plan and interventions as ordered.  Monitor patient's  weight and dietary intake as ordered or per policy. Utilize nutrition screening tool and intervene as necessary. Determine patient's food preferences and provide high-protein, high-caloric foods as appropriate.     INTERVENTIONS:  - Monitor oral intake, urinary output, labs, and treatment plans  - Assess nutrition and hydration status and recommend course of action  - Evaluate amount of meals eaten  - Assist patient with eating if necessary   - Allow adequate time for meals  - Recommend/ encourage appropriate diets, oral nutritional supplements, and vitamin/mineral supplements  - Order, calculate, and assess calorie counts as needed  - Recommend, monitor, and adjust tube feedings and TPN/PPN based on assessed needs  - Assess need for intravenous fluids  - Provide specific nutrition/hydration education as appropriate  - Include patient/family/caregiver in decisions related to nutrition  Outcome: Progressing     Problem: GASTROINTESTINAL - ADULT  Goal: Minimal or absence of nausea and/or vomiting  Description: INTERVENTIONS:  - Administer IV fluids if ordered to ensure adequate hydration  - Maintain NPO status until nausea and vomiting are resolved  - Nasogastric tube if ordered  - Administer ordered antiemetic medications as needed  - Provide nonpharmacologic comfort measures as appropriate  - Advance diet as tolerated, if ordered  - Consider nutrition services referral to assist patient with adequate nutrition and appropriate food choices  Outcome: Progressing  Goal: Maintains or returns to baseline bowel function  Description: INTERVENTIONS:  - Assess bowel function  - Encourage oral fluids to ensure adequate hydration  - Administer IV fluids if ordered to ensure adequate hydration  - Administer ordered medications as needed  - Encourage mobilization and activity  - Consider nutritional services referral to assist patient with adequate nutrition and appropriate food choices  Outcome: Progressing  Goal: Maintains  adequate nutritional intake  Description: INTERVENTIONS:  - Monitor percentage of each meal consumed  - Identify factors contributing to decreased intake, treat as appropriate  - Assist with meals as needed  - Monitor I&O, weight, and lab values if indicated  - Obtain nutrition services referral as needed  Outcome: Progressing     Problem: METABOLIC, FLUID AND ELECTROLYTES - ADULT  Goal: Electrolytes maintained within normal limits  Description: INTERVENTIONS:  - Monitor labs and assess patient for signs and symptoms of electrolyte imbalances  - Administer electrolyte replacement as ordered  - Monitor response to electrolyte replacements, including repeat lab results as appropriate  - Instruct patient on fluid and nutrition as appropriate  Outcome: Progressing  Goal: Fluid balance maintained  Description: INTERVENTIONS:  - Monitor labs   - Monitor I/O and WT  - Instruct patient on fluid and nutrition as appropriate  - Assess for signs & symptoms of volume excess or deficit  Outcome: Progressing     Problem: Prexisting or High Potential for Compromised Skin Integrity  Goal: Skin integrity is maintained or improved  Description: INTERVENTIONS:  - Identify patients at risk for skin breakdown  - Assess and monitor skin integrity  - Assess and monitor nutrition and hydration status  - Monitor labs   - Assess for incontinence   - Turn and reposition patient  - Assist with mobility/ambulation  - Relieve pressure over bony prominences  - Avoid friction and shearing  - Provide appropriate hygiene as needed including keeping skin clean and dry  - Evaluate need for skin moisturizer/barrier cream  - Collaborate with interdisciplinary team   - Patient/family teaching  - Consider wound care consult   Outcome: Progressing

## 2024-02-09 NOTE — OCCUPATIONAL THERAPY NOTE
Occupational Therapy Evaluation     Patient Name: Celine Roa  Today's Date: 2/9/2024  Problem List  Principal Problem:    Intractable right upper quadrant abdominal pain  Active Problems:    Headache    Acquired hypothyroidism    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HCC)    Crohn's colitis (HCC)    Systemic lupus erythematosus, unspecified SLE type, unspecified organ involvement status (HCC)    Past Medical History  Past Medical History:   Diagnosis Date    Anemia 2007    Anxiety     Asthma     Bile duct stricture 2014    Sphincter of oddi dysfunction    Chronic kidney disease     Colon polyp 1998    Crohn's colitis (HCC)     Deep vein thrombosis (HCC)     Disease of thyroid gland     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 pt denies, documented in cardio note    Pancreatitis     sphinger of     Psychiatric disorder     RA (rheumatoid arthritis) (HCC)     Seizures (HCC)     Ulcerative colitis (HCC)     Visual impairment      Past Surgical History  Past Surgical History:   Procedure Laterality Date    ABDOMINAL SURGERY  2017    APPENDECTOMY      CHOLECYSTECTOMY      COLONOSCOPY  2019    CYSTOSCOPY N/A 6/8/2023    Procedure: CYSTOSCOPY, mini TURBT with fulgeration;  Surgeon: Tee Bruno MD;  Location: [facility];  Service: Urology    EGD AND COLONOSCOPY N/A 09/12/2017    Procedure: EGD AND COLONOSCOPY;  Surgeon: Tommy Oliver MD;  Location:  GI LAB;  Service: Gastroenterology    ERCP N/A 09/15/2017    Procedure: ENDOSCOPIC RETROGRADE CHOLANGIOPANCREATOGRAPHY (ERCP);  Surgeon: Dre Soto MD;  Location: [facility];  Service: Gastroenterology    HYSTERECTOMY      KNEE SURGERY Right     LAPAROSCOPIC  ENDOMETRIOSIS FULGURATION      ORIF ANKLE FRACTURE Left     MI ARTHRS KNE SURG W/MENISCECTOMY MED/LAT W/SHVG Left 05/12/2017    Procedure: POSSIBLE MEDIAL MENISECTOMY;  Surgeon: Kristian Avila MD;  Location: [facility];  Service: Orthopedics    MI ARTHRS KNEE DEBRIDEMENT/SHAVING ARTCLR CRTLG Left 05/12/2017    Procedure: KNEE ARTHROSCOPY EVALUATION, CHONDROPLASTY;  Surgeon: Kristian Avila MD;  Location: [facility];  Service: Orthopedics    MI LAPS ABD PRTM&OMENTUM DX W/WO SPEC BR/WA SPX N/A 12/12/2017    Procedure: LAPAROSCOPY DIAGNOSTIC  WITH LYSIS OF ADHESIONS;  Surgeon: Olaf John DO;  Location:  MAIN OR;  Service: General    UPPER GASTROINTESTINAL ENDOSCOPY  2019 02/09/24 1027   OT Last Visit   OT Visit Date 02/09/24   Note Type   Note type Evaluation   Pain Assessment   Pain Assessment Tool 0-10   Pain Score 8  (8/10 at rest; 10/10 with activity)   Pain Location/Orientation Orientation: Right;Location: Rib Cage   Pain Radiating Towards n/a   Pain Onset/Description Onset: Ongoing;Frequency: Constant/Continuous;Descriptor: Discomfort   Effect of Pain on Daily Activities mobility and activity tolerance   Patient's Stated Pain Goal No pain   Hospital Pain Intervention(s) Medication (See MAR);Emotional support;Environmental changes;Rest   Multiple Pain Sites No   Restrictions/Precautions   Weight Bearing Precautions Per Order No   Braces or Orthoses   (none reported)   Other Precautions Multiple lines;Fall Risk;Pain   Home Living   Type of Home House   Home Layout One level;Performs ADLs on one level  (bedroom and laundry in basement; 10 VIMAL or 2 VIMAL from yard entry)   Bathroom Shower/Tub Tub/shower unit   Bathroom Toilet Raised   Bathroom Equipment Grab bars in shower   Bathroom Accessibility Accessible   Home Equipment   (no device used at baseline)   Prior Function   Level of [place] Independent with ADLs;Independent with functional mobility;Needs assistance with IADLS  (shared responsibility with  "spouse for IADLs)   Lives With Spouse   Receives Help From Family   IADLs Independent with driving;Family/Friend/Other provides meals;Independent with medication management   Falls in the last 6 months 0  (pt denies)   Subjective   Subjective \"I wish they would give me some answers\"   ADL   Where Assessed Edge of bed   UB Bathing Assistance 6  Modified Independent   LB Bathing Assistance 4  Minimal Assistance  (min A due to rib pain)   UB Dressing Assistance 6  Modified independent   LB Dressing Assistance 4  Minimal Assistance  (min A due to rib pain; pt reports spouse available to assist at home. Discussed additional compensatory strategies, including cross over leg technique, to assist with LB dressing and reduce pain. Pt receptive to education.)   Bed Mobility   Supine to Sit 6  Modified independent   Additional items HOB elevated   Sit to Supine 6  Modified independent   Additional items HOB elevated   Transfers   Sit to Stand 6  Modified independent   Additional items Bedrails   Stand to Sit 6  Modified independent   Additional items Bedrails   Additional Comments no device used   Functional Mobility   Functional Mobility 6  Modified independent   Additional Comments Pt completed short distance ADL mobility around room at mod I level w/ no LOB observed. No device used. Increase in right rib pain reported. returned supine in bed with increased time to alleivate pain - RN notified.   Balance   Static Sitting Normal   Dynamic Sitting Normal   Static Standing Good   Dynamic Standing Fair +   Ambulatory Fair   Activity Tolerance   Activity Tolerance Patient limited by pain   Medical Staff Made Aware Yes, CM made aware of d/c recs   Nurse Made Aware Yes, nursing staff made aware of session outcomes   RUE Assessment   RUE Assessment WFL   RUE Strength   RUE Overall Strength   (3+/5)   LUE Assessment   LUE Assessment WFL   LUE Strength   LUE Overall Strength   (3+/5)   Hand Function   Gross Motor Coordination " Functional   Fine Motor Coordination Functional   Psychosocial   Psychosocial (WDL) WDL   Cognition   Overall Cognitive Status WFL   Arousal/Participation Alert;Responsive;Cooperative   Attention Within functional limits   Orientation Level Oriented X4   Memory Within functional limits   Following Commands Follows all commands and directions without difficulty   Comments Pt agreeable to OT evaluation, pleasant   Assessment   Limitation Decreased endurance;Decreased UE strength   Prognosis Good   Assessment Pt is a 48 y.o. female seen for OT evaluation s/p admit to [facility] on [facility]/7/2024 w/ Intractable right upper quadrant abdominal pain.  Comorbidities affecting pt's functional performance at time of assessment include: HA, crohns, systemic lupus erythematosus, SIRS, abdominal pain, hx of MS. Personal factors affecting pt at time of IE include:steps to enter environment, difficulty performing IADLS , and health management . Prior to admission, pt was I with ADLS and has shared responsibilities with spouse for IADLS . Upon evaluation: pt is completing self-care tasks and functional mobility at mod I level w/ occasional assistance for lower body dressing due to rib pain. Pt is currently functioning at/around baseline and does not warrant any further inpatient OT services at this time. From OT standpoint, recommendation at time of d/c would be home with outpatient PT services.   Goals   Patient Goals to go home with some answers   Plan   OT Frequency Eval only   Discharge Recommendation   Rehab Resource Intensity Level, OT No post-acute rehabilitation needs  (Pt would benefit from outpatient PT services to improve strength and endurance)   Additional Comments  The patient's raw score on the AM-PAC Daily Activity inpatient short form is 22, standardized score is 47.1, greater than 39.4. Patients at this level are likely to benefit from discharge with home with outpatient PT services to increase strength and endurance.  Please refer to the recommendation of the Occupational Therapist for safe discharge planning.   AM-PAC Daily Activity Inpatient   Lower Body Dressing 3   Bathing 3   Toileting 4   Upper Body Dressing 4   Grooming 4   Eating 4   Daily Activity Raw Score 22   Daily Activity Standardized Score (Calc for Raw Score >=11) 47.1   AM-PAC Applied Cognition Inpatient   Following a Speech/Presentation 4   Understanding Ordinary Conversation 4   Taking Medications 4   Remembering Where Things Are Placed or Put Away 4   Remembering List of 4-5 Errands 4   Taking Care of Complicated Tasks 4   Applied Cognition Raw Score 24   Applied Cognition Standardized Score 62.21     All needs met, call bell within reach  Abby Morrell MS, OTR/L

## 2024-02-09 NOTE — PROGRESS NOTES
[facility]  Progress Note  Name: Celine Roa I  MRN: [id number]  Unit/Bed#: 402-01 I Date of Admission: 2/7/2024   Date of Service: 2/9/2024 I Hospital Day: 0    Assessment/Plan   Crohn's colitis (HCC)  Assessment & Plan  History of Crohn's  No evidence of acute flare  Continue PTA medication  Continue GI follow-up    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HCC)  Assessment & Plan  Continue PTA medication  Continue outpatient hematology, immunology follow-up    Systemic lupus erythematosus, unspecified SLE type, unspecified organ involvement status (HCC)  Assessment & Plan  Continue PTA medication    Acquired hypothyroidism  Assessment & Plan  Continue levothyroxine    Headache  Assessment & Plan  Ongoing headache at this time  Patient requesting fioricet - improved with this    * Intractable right upper quadrant abdominal pain  Assessment & Plan  Presented to the emergency room with complaints of abdominal pain  Patient reports pain in right upper quadrant with radiation to right back  Associated with nausea and dry heaving, denies diarrhea.  Patient reports subjective fever on Monday  CT shows-  Intrahepatic and extrahepatic biliary ductal dilation, similar to prior MRI and greater than expected solely on the basis of a postcholecystectomy state. Repeat MRI may be performed to assess for underlying stricture or obstructing lesion. Alternatively, ERCP/EUS may be performed if clinically indicated.  Received Pain medication in the ER with little relief   Will keep NPO for now until MRI completed and then can trial clear liquids  Continue IV fluids  Pain medication PRN  Zofran PRN  GI consult appreciated  Will complete MRCP at this time               VTE Pharmacologic Prophylaxis:   High Risk (Score >/= 5) - Pharmacological DVT Prophylaxis Contraindicated. Sequential Compression Devices Ordered.    Mobility:   Basic Mobility Inpatient Raw Score: 20  JH-HLM Goal: 6: Walk 10 steps or more  JH-HLM  Achieved: 6: Walk 10 steps or more  HLM Goal achieved. Continue to encourage appropriate mobility.    Patient Centered Rounds: I performed bedside rounds with nursing staff today.   Discussions with Specialists or Other Care Team Provider: case management, GI    Education and Discussions with Family / Patient: Patient declined call to .     Total Time Spent on Date of Encounter in care of patient: 45 mins. This time was spent on one or more of the following: performing physical exam; counseling and coordination of care; obtaining or reviewing history; documenting in the medical record; reviewing/ordering tests, medications or procedures; communicating with other healthcare professionals and discussing with patient's family/caregivers.    Current Length of Stay: 0 day(s)  Current Patient Status: Observation   Certification Statement: The patient will continue to require additional inpatient hospital stay due to mrcp, abdominal pain, advancing diet  Discharge Plan: Anticipate discharge in 48-72 hrs to home.    Code Status: Level 1 - Full Code    Subjective:   Patient reports same level of RUQ pain at this time. No bowel movements since admission.     Objective:     Vitals:   Temp (24hrs), Av.1 °F (36.7 °C), Min:97.8 °F (36.6 °C), Max:98.5 °F (36.9 °C)    Temp:  [97.8 °F (36.6 °C)-98.5 °F (36.9 °C)] 97.8 °F (36.6 °C)  HR:  [85-89] 86  Resp:  [18-19] 18  BP: (104-122)/(66-70) 122/70  SpO2:  [92 %-98 %] 98 %  Body mass index is 26.72 kg/m².     Input and Output Summary (last 24 hours):     Intake/Output Summary (Last 24 hours) at 2024 1258  Last data filed at 2024 1157  Gross per 24 hour   Intake 0 ml   Output --   Net 0 ml       Physical Exam:   Physical Exam  Vitals and nursing note reviewed.   Constitutional:       General: She is not in acute distress.     Appearance: She is not ill-appearing.   Cardiovascular:      Rate and Rhythm: Normal rate and regular rhythm.      Pulses: Normal pulses.       Heart sounds: Normal heart sounds. No murmur heard.     No gallop.   Pulmonary:      Effort: Pulmonary effort is normal.      Breath sounds: Normal breath sounds. No wheezing, rhonchi or rales.   Abdominal:      Palpations: Abdomen is soft.      Tenderness: There is abdominal tenderness.   Skin:     General: Skin is warm and dry.   Neurological:      Mental Status: She is alert. Mental status is at baseline.   Psychiatric:         Mood and Affect: Mood normal.         Behavior: Behavior normal.          Additional Data:     Labs:  Results from last 7 days   Lab Units 02/09/24  0515   WBC Thousand/uL 6.28   HEMOGLOBIN g/dL 13.9   HEMATOCRIT % 42.9   PLATELETS Thousands/uL 374   NEUTROS PCT % 50   LYMPHS PCT % 32   MONOS PCT % 9   EOS PCT % 6     Results from last 7 days   Lab Units 02/09/24  0515 02/08/24  0552   SODIUM mmol/L 137 139   POTASSIUM mmol/L 4.0 4.1   CHLORIDE mmol/L 105 106   CO2 mmol/L 25 28   BUN mg/dL 7 8   CREATININE mg/dL 0.68 0.69   ANION GAP mmol/L 7 5   CALCIUM mg/dL 7.9* 7.9*   ALBUMIN g/dL  --  3.6   TOTAL BILIRUBIN mg/dL  --  0.49   ALK PHOS U/L  --  48   ALT U/L  --  23   AST U/L  --  13   GLUCOSE RANDOM mg/dL 71 74     Results from last 7 days   Lab Units 02/07/24  1746   INR  1.00             Results from last 7 days   Lab Units 02/07/24  1721   LACTIC ACID mmol/L 1.3       Lines/Drains:  Invasive Devices       Peripheral Intravenous Line  Duration             Peripheral IV 02/07/24 Upper;Ventral (anterior);Left Arm 1 day              Line  Duration             Pump Device  -- days                          Imaging: No pertinent imaging reviewed.    Recent Cultures (last 7 days):         Last 24 Hours Medication List:   Current Facility-Administered Medications   Medication Dose Route Frequency Provider Last Rate    vitamin C  1,000 mg Oral BID Gilberto Martinez PA-C      butalbital-acetaminophen-caffeine  1 tablet Oral Q4H PRN Teresa Nuñez PA-C      cholecalciferol  1,000 Units Oral BID Gilberto  Juan, RANDEE      cyanocobalamin  100 mcg Oral Daily Gilberto Martinez, RANDEE      diphenhydrAMINE  25 mg Intravenous Q4H PRN Teresa Nuñez PA-C      famotidine  40 mg Oral Daily Gilberto Martinez, RANDEE      folic acid  1 mg Oral Daily Gilberto Martinez, RANDEE      HYDROmorphone  0.5 mg Intravenous Q4H PRN Teresa Nuñez PA-C      hydrOXYzine HCL  15 mg Oral Daily Gilberto Martinez, RANDEE      levothyroxine  75 mcg Oral Every Other Day Gilberto Martinez, RANDEE      liothyronine  5 mcg Oral Daily Gilberto Martinez, RANDEE      loratadine  10 mg Oral Daily Gilberto Martinez, RANDEE      Magnesium Gluconate  500 mg Oral BID Gilberto Martinez, RANDEE      mirtazapine  15 mg Oral HS Gilberto Martinez, RANDEE      montelukast  10 mg Oral HS Gilberto Martinez, RANDEE      niacin  100 mg Oral BID With Meals Gilberto Martinez PA-C      ondansetron  4 mg Intravenous Q6H PRN Gilberto Martinez, RANDEE      predniSONE  10 mg Oral Daily Gilberto Martinez, RANDEE      sodium chloride  75 mL/hr Intravenous Continuous Gilberto Martinez PA-C 75 mL/hr (02/09/24 1217)        Today, Patient Was Seen By: Teresa Nuñez PA-C    **Please Note: This note may have been constructed using a voice recognition system.**

## 2024-02-09 NOTE — PHYSICAL THERAPY NOTE
PHYSICAL THERAPY      Patient Name: Celine Roa  Today's Date: 2/9/2024 02/09/24 1054   PT Last Visit   PT Visit Date 02/09/24   Note Type   Note type Screen   Cancel Reasons Other     Order received and chart review performed. Per OT evaluation, pt is performing functional mobility independently with no device. Pt does not require skilled PT intervention at this time; will d/c PT orders.    Chacha Barfield

## 2024-02-09 NOTE — PLAN OF CARE
Problem: Nutrition/Hydration-ADULT  Goal: Nutrient/Hydration intake appropriate for improving, restoring or maintaining nutritional needs  Description: Monitor and assess patient's nutrition/hydration status for malnutrition. Collaborate with interdisciplinary team and initiate plan and interventions as ordered.  Monitor patient's weight and dietary intake as ordered or per policy. Utilize nutrition screening tool and intervene as necessary. Determine patient's food preferences and provide high-protein, high-caloric foods as appropriate.     INTERVENTIONS:  - Monitor oral intake, urinary output, labs, and treatment plans  - Assess nutrition and hydration status and recommend course of action  - Evaluate amount of meals eaten  - Assist patient with eating if necessary   - Allow adequate time for meals  - Recommend/ encourage appropriate diets, oral nutritional supplements, and vitamin/mineral supplements  - Order, calculate, and assess calorie counts as needed  - Recommend, monitor, and adjust tube feedings and TPN/PPN based on assessed needs  - Assess need for intravenous fluids  - Provide specific nutrition/hydration education as appropriate  - Include patient/family/caregiver in decisions related to nutrition  Outcome: Progressing     Problem: INFECTION - ADULT  Goal: Absence or prevention of progression during hospitalization  Description: INTERVENTIONS:  - Assess and monitor for signs and symptoms of infection  - Monitor lab/diagnostic results  - Monitor all insertion sites, i.e. indwelling lines  - Monitor endotracheal if appropriate and nasal secretions for changes in amount and color  - [place] appropriate cooling/warming therapies per order  - Administer medications as ordered  - Instruct and encourage patient and family to use good hand hygiene technique  - Identify and instruct in appropriate isolation precautions for identified infection/condition  Outcome: Progressing     Problem: PAIN - ADULT  Goal:  Verbalizes/displays adequate comfort level or baseline comfort level  Description: Interventions:  - Encourage patient to monitor pain and request assistance  - Assess pain using appropriate pain scale  - Administer analgesics based on type and severity of pain and evaluate response  - Implement non-pharmacological measures as appropriate and evaluate response  - Consider cultural and social influences on pain and pain management  - Notify physician/advanced practitioner if interventions unsuccessful or patient reports new pain  Outcome: Progressing

## 2024-02-09 NOTE — ASSESSMENT & PLAN NOTE
Presented to the emergency room with complaints of abdominal pain  Patient reports pain in right upper quadrant with radiation to right back  Associated with nausea and dry heaving, denies diarrhea.  Patient reports subjective fever on Monday  CT shows-  Intrahepatic and extrahepatic biliary ductal dilation, similar to prior MRI and greater than expected solely on the basis of a postcholecystectomy state. Repeat MRI may be performed to assess for underlying stricture or obstructing lesion. Alternatively, ERCP/EUS may be performed if clinically indicated.  Received Pain medication in the ER with little relief   Will keep NPO for now until MRI completed and then can trial clear liquids  Continue IV fluids  Pain medication PRN  Zofran PRN  GI consult appreciated  Will complete MRCP at this time

## 2024-02-10 PROBLEM — N32.89 BLADDER SPASM: Status: ACTIVE | Noted: 2024-02-10

## 2024-02-10 LAB
BACTERIA UR QL AUTO: NORMAL /HPF
BILIRUB UR QL STRIP: ABNORMAL
CLARITY UR: CLEAR
COLOR UR: YELLOW
GLUCOSE UR STRIP-MCNC: ABNORMAL MG/DL
HGB UR QL STRIP.AUTO: ABNORMAL
KETONES UR STRIP-MCNC: ABNORMAL MG/DL
LEUKOCYTE ESTERASE UR QL STRIP: ABNORMAL
NITRITE UR QL STRIP: NEGATIVE
NON-SQ EPI CELLS URNS QL MICRO: NORMAL /HPF
PH UR STRIP.AUTO: 5.5 [PH]
PROT UR STRIP-MCNC: NEGATIVE MG/DL
RBC #/AREA URNS AUTO: NORMAL /HPF
SP GR UR STRIP.AUTO: >=1.03 (ref 1–1.03)
UROBILINOGEN UR QL STRIP.AUTO: 0.2 E.U./DL
WBC #/AREA URNS AUTO: NORMAL /HPF

## 2024-02-10 PROCEDURE — 99232 SBSQ HOSP IP/OBS MODERATE 35: CPT | Performed by: PHYSICIAN ASSISTANT

## 2024-02-10 PROCEDURE — 81001 URINALYSIS AUTO W/SCOPE: CPT | Performed by: PHYSICIAN ASSISTANT

## 2024-02-10 RX ORDER — DIPHENHYDRAMINE HYDROCHLORIDE 50 MG/ML
50 INJECTION INTRAMUSCULAR; INTRAVENOUS EVERY 4 HOURS PRN
Status: DISCONTINUED | OUTPATIENT
Start: 2024-02-10 | End: 2024-02-13 | Stop reason: HOSPADM

## 2024-02-10 RX ORDER — OXYBUTYNIN CHLORIDE 5 MG/1
5 TABLET ORAL 3 TIMES DAILY
Status: DISCONTINUED | OUTPATIENT
Start: 2024-02-10 | End: 2024-02-11

## 2024-02-10 RX ADMIN — DIPHENHYDRAMINE HYDROCHLORIDE 25 MG: 50 INJECTION, SOLUTION INTRAMUSCULAR; INTRAVENOUS at 11:07

## 2024-02-10 RX ADMIN — HYDROMORPHONE HYDROCHLORIDE 0.5 MG: 1 INJECTION, SOLUTION INTRAMUSCULAR; INTRAVENOUS; SUBCUTANEOUS at 11:06

## 2024-02-10 RX ADMIN — HYDROMORPHONE HYDROCHLORIDE 0.5 MG: 1 INJECTION, SOLUTION INTRAMUSCULAR; INTRAVENOUS; SUBCUTANEOUS at 08:48

## 2024-02-10 RX ADMIN — HYDROMORPHONE HYDROCHLORIDE 0.5 MG: 1 INJECTION, SOLUTION INTRAMUSCULAR; INTRAVENOUS; SUBCUTANEOUS at 06:49

## 2024-02-10 RX ADMIN — DIPHENHYDRAMINE HYDROCHLORIDE 50 MG: 50 INJECTION, SOLUTION INTRAMUSCULAR; INTRAVENOUS at 23:50

## 2024-02-10 RX ADMIN — SODIUM CHLORIDE 75 ML/HR: 0.9 INJECTION, SOLUTION INTRAVENOUS at 02:41

## 2024-02-10 RX ADMIN — ONDANSETRON 4 MG: 2 INJECTION INTRAMUSCULAR; INTRAVENOUS at 15:14

## 2024-02-10 RX ADMIN — HYDROMORPHONE HYDROCHLORIDE 0.5 MG: 1 INJECTION, SOLUTION INTRAMUSCULAR; INTRAVENOUS; SUBCUTANEOUS at 23:50

## 2024-02-10 RX ADMIN — HYDROMORPHONE HYDROCHLORIDE 0.5 MG: 1 INJECTION, SOLUTION INTRAMUSCULAR; INTRAVENOUS; SUBCUTANEOUS at 02:40

## 2024-02-10 RX ADMIN — HYDROMORPHONE HYDROCHLORIDE 0.5 MG: 1 INJECTION, SOLUTION INTRAMUSCULAR; INTRAVENOUS; SUBCUTANEOUS at 16:20

## 2024-02-10 RX ADMIN — DIPHENHYDRAMINE HYDROCHLORIDE 25 MG: 50 INJECTION, SOLUTION INTRAMUSCULAR; INTRAVENOUS at 15:14

## 2024-02-10 RX ADMIN — DIPHENHYDRAMINE HYDROCHLORIDE 25 MG: 50 INJECTION, SOLUTION INTRAMUSCULAR; INTRAVENOUS at 02:40

## 2024-02-10 RX ADMIN — HYDROMORPHONE HYDROCHLORIDE 0.5 MG: 1 INJECTION, SOLUTION INTRAMUSCULAR; INTRAVENOUS; SUBCUTANEOUS at 00:43

## 2024-02-10 RX ADMIN — ONDANSETRON 4 MG: 2 INJECTION INTRAMUSCULAR; INTRAVENOUS at 06:17

## 2024-02-10 RX ADMIN — HYDROMORPHONE HYDROCHLORIDE 0.5 MG: 1 INJECTION, SOLUTION INTRAMUSCULAR; INTRAVENOUS; SUBCUTANEOUS at 15:14

## 2024-02-10 RX ADMIN — DIPHENHYDRAMINE HYDROCHLORIDE 25 MG: 50 INJECTION, SOLUTION INTRAMUSCULAR; INTRAVENOUS at 06:49

## 2024-02-10 RX ADMIN — HYDROMORPHONE HYDROCHLORIDE 0.5 MG: 1 INJECTION, SOLUTION INTRAMUSCULAR; INTRAVENOUS; SUBCUTANEOUS at 21:31

## 2024-02-10 RX ADMIN — DIPHENHYDRAMINE HYDROCHLORIDE 50 MG: 50 INJECTION, SOLUTION INTRAMUSCULAR; INTRAVENOUS at 18:55

## 2024-02-10 RX ADMIN — HYDROMORPHONE HYDROCHLORIDE 0.5 MG: 1 INJECTION, SOLUTION INTRAMUSCULAR; INTRAVENOUS; SUBCUTANEOUS at 19:29

## 2024-02-10 NOTE — ASSESSMENT & PLAN NOTE
Ongoing headache at this time  Patient requesting fioricet - improved with this  Reports resolved 2/10

## 2024-02-10 NOTE — PLAN OF CARE
Problem: PAIN - ADULT  Goal: Verbalizes/displays adequate comfort level or baseline comfort level  Description: Interventions:  - Encourage patient to monitor pain and request assistance  - Assess pain using appropriate pain scale  - Administer analgesics based on type and severity of pain and evaluate response  - Implement non-pharmacological measures as appropriate and evaluate response  - Consider cultural and social influences on pain and pain management  - Notify physician/advanced practitioner if interventions unsuccessful or patient reports new pain  Outcome: Progressing     Problem: INFECTION - ADULT  Goal: Absence or prevention of progression during hospitalization  Description: INTERVENTIONS:  - Assess and monitor for signs and symptoms of infection  - Monitor lab/diagnostic results  - Monitor all insertion sites, i.e. indwelling lines  - Monitor endotracheal if appropriate and nasal secretions for changes in amount and color  - [place] appropriate cooling/warming therapies per order  - Administer medications as ordered  - Instruct and encourage patient and family to use good hand hygiene technique  - Identify and instruct in appropriate isolation precautions for identified infection/condition  Outcome: Progressing     Problem: SAFETY ADULT  Goal: Patient will remain free of falls  Description: INTERVENTIONS:  - Educate patient/family on patient safety including physical limitations  - Instruct patient to call for assistance with activity   - Consult OT/PT to assist with strengthening/mobility   - Keep Call bell within reach  - Keep bed low and locked with side rails adjusted as appropriate  - Keep care items and personal belongings within reach  - Initiate and maintain comfort rounds  - Make Fall Risk Sign visible to staff  - Offer Toileting every 4 Hours, in advance of need  - Initiate/Maintain bed alarm  - Obtain necessary fall risk management equipment  - Apply yellow socks and bracelet for high  fall risk patients  - Consider moving patient to room near nurses station  Outcome: Progressing  Goal: Maintain or return to baseline ADL function  Description: INTERVENTIONS:  -  Assess patient's ability to carry out ADLs; assess patient's baseline for ADL function and identify physical deficits which impact ability to perform ADLs (bathing, care of mouth/teeth, toileting, grooming, dressing, etc.)  - Assess/evaluate cause of self-care deficits   - Assess range of motion  - Assess patient's mobility; develop plan if impaired  - Assess patient's need for assistive devices and provide as appropriate  - Encourage maximum independence but intervene and supervise when necessary  - Involve family in performance of ADLs  - Assess for home care needs following discharge   - Consider OT consult to assist with ADL evaluation and planning for discharge  - Provide patient education as appropriate  Outcome: Progressing  Goal: Maintains/Returns to pre admission functional level  Description: INTERVENTIONS:  - Perform AM-PAC 6 Click Basic Mobility/ Daily Activity assessment daily.  - Set and communicate daily mobility goal to care team and patient/family/caregiver.   - Collaborate with rehabilitation services on mobility goals if consulted  - Perform Range of Motion 3 times a day.  - Reposition patient every 2 hours.  - Dangle patient 3 times a day  - Stand patient 3 times a day  - Ambulate patient 3 times a day  - Out of bed to chair 3 times a day   - Out of bed for meals 3 times a day  - Out of bed for toileting  - Record patient progress and toleration of activity level   Outcome: Progressing     Problem: Nutrition/Hydration-ADULT  Goal: Nutrient/Hydration intake appropriate for improving, restoring or maintaining nutritional needs  Description: Monitor and assess patient's nutrition/hydration status for malnutrition. Collaborate with interdisciplinary team and initiate plan and interventions as ordered.  Monitor patient's  weight and dietary intake as ordered or per policy. Utilize nutrition screening tool and intervene as necessary. Determine patient's food preferences and provide high-protein, high-caloric foods as appropriate.     INTERVENTIONS:  - Monitor oral intake, urinary output, labs, and treatment plans  - Assess nutrition and hydration status and recommend course of action  - Evaluate amount of meals eaten  - Assist patient with eating if necessary   - Allow adequate time for meals  - Recommend/ encourage appropriate diets, oral nutritional supplements, and vitamin/mineral supplements  - Order, calculate, and assess calorie counts as needed  - Recommend, monitor, and adjust tube feedings and TPN/PPN based on assessed needs  - Assess need for intravenous fluids  - Provide specific nutrition/hydration education as appropriate  - Include patient/family/caregiver in decisions related to nutrition  Outcome: Progressing     Problem: GASTROINTESTINAL - ADULT  Goal: Minimal or absence of nausea and/or vomiting  Description: INTERVENTIONS:  - Administer IV fluids if ordered to ensure adequate hydration  - Maintain NPO status until nausea and vomiting are resolved  - Nasogastric tube if ordered  - Administer ordered antiemetic medications as needed  - Provide nonpharmacologic comfort measures as appropriate  - Advance diet as tolerated, if ordered  - Consider nutrition services referral to assist patient with adequate nutrition and appropriate food choices  Outcome: Progressing  Goal: Maintains or returns to baseline bowel function  Description: INTERVENTIONS:  - Assess bowel function  - Encourage oral fluids to ensure adequate hydration  - Administer IV fluids if ordered to ensure adequate hydration  - Administer ordered medications as needed  - Encourage mobilization and activity  - Consider nutritional services referral to assist patient with adequate nutrition and appropriate food choices  Outcome: Progressing  Goal: Maintains  adequate nutritional intake  Description: INTERVENTIONS:  - Monitor percentage of each meal consumed  - Identify factors contributing to decreased intake, treat as appropriate  - Assist with meals as needed  - Monitor I&O, weight, and lab values if indicated  - Obtain nutrition services referral as needed  Outcome: Progressing     Problem: METABOLIC, FLUID AND ELECTROLYTES - ADULT  Goal: Electrolytes maintained within normal limits  Description: INTERVENTIONS:  - Monitor labs and assess patient for signs and symptoms of electrolyte imbalances  - Administer electrolyte replacement as ordered  - Monitor response to electrolyte replacements, including repeat lab results as appropriate  - Instruct patient on fluid and nutrition as appropriate  Outcome: Progressing  Goal: Fluid balance maintained  Description: INTERVENTIONS:  - Monitor labs   - Monitor I/O and WT  - Instruct patient on fluid and nutrition as appropriate  - Assess for signs & symptoms of volume excess or deficit  Outcome: Progressing     Problem: Prexisting or High Potential for Compromised Skin Integrity  Goal: Skin integrity is maintained or improved  Description: INTERVENTIONS:  - Identify patients at risk for skin breakdown  - Assess and monitor skin integrity  - Assess and monitor nutrition and hydration status  - Monitor labs   - Assess for incontinence   - Turn and reposition patient  - Assist with mobility/ambulation  - Relieve pressure over bony prominences  - Avoid friction and shearing  - Provide appropriate hygiene as needed including keeping skin clean and dry  - Evaluate need for skin moisturizer/barrier cream  - Collaborate with interdisciplinary team   - Patient/family teaching  - Consider wound care consult   Outcome: Progressing

## 2024-02-10 NOTE — UTILIZATION REVIEW
Continued Stay Review    Date: 2- 10-24                           Current Patient Class: inpatient  Current Level of Care: med srug    HPI:48 y.o. female initially admitted on 2-7-24 for intractable abdominal pain       Assessment/Plan:     GI consult completed: Chron's disease vs indeterminate colitis.  Currently on skyrizi and methotrexate.  Pain persists 8/10 requiring iv dilaudid overnight 4 x.  MRCP AND RENAL US without new acute finding. Continue iv fluids 75/hr.  Received iv zofran at 0617 for nausea.  NPO advanced to clear liquid diet.      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      RENAL ULTRASOUND   INDICATION: not emptying bladder, not accurate on bladder scan.  IMPRESSION:     Normal renal ultrasound.    Results from last 7 days   Lab Units 02/07/24  1721   SARS-COV-2  Negative     Results from last 7 days   Lab Units 02/09/24  0515 02/08/24  0552 02/07/24  1721   WBC Thousand/uL 6.28 7.45 11.49*   HEMOGLOBIN g/dL 13.9 14.1 15.6*   HEMATOCRIT % 42.9 43.9 47.7*   PLATELETS Thousands/uL 374 416* 464*   NEUTROS ABS Thousands/µL 3.19 3.79 7.58         Results from last 7 days   Lab Units 02/09/24  0515 02/08/24  0552 02/07/24  1721   SODIUM mmol/L 137 139 137   POTASSIUM mmol/L 4.0 4.1 4.2   CHLORIDE mmol/L 105 106 102   CO2 mmol/L 25 28 27   ANION GAP mmol/L 7 5 8   BUN mg/dL 7 8 15   CREATININE mg/dL 0.68 0.69 0.84   EGFR ml/min/1.73sq m 102 101 81   CALCIUM mg/dL 7.9* 7.9* 9.2   MAGNESIUM  mg/dL  --  2.3 2.3   PHOSPHORUS mg/dL  --  2.6*  --      Results from last 7 days   Lab Units 02/08/24  0552 02/07/24  1721   AST U/L 13 16   ALT U/L 23 29   ALK PHOS U/L 48 61   TOTAL PROTEIN g/dL 5.8* 7.1   ALBUMIN g/dL 3.6 4.3   TOTAL BILIRUBIN mg/dL 0.49 0.40         Results from last 7 days   Lab Units 02/09/24  0515 02/08/24  0552 02/07/24  1721   GLUCOSE RANDOM mg/dL 71 74 80           Results from last 7 days   Lab Units 02/07/24  1721   HS TNI 0HR ng/L <2         Results from last 7 days   Lab Units 02/07/24  1746   PROTIME seconds 13.1   INR  1.00   PTT seconds 24             Results from last 7 days   Lab Units 02/07/24  1721   LACTIC ACID mmol/L 1.3     Results from last 7 days   Lab Units 02/07/24  1721   LIPASE u/L 21     Results from last 7 days   Lab Units 02/07/24  1721   CRP mg/L 4.3*             Results from last 7 days   Lab Units 02/08/24  0313   CLARITY UA  Clear   COLOR UA  Yellow   SPEC GRAV UA  1.015   PH UA  6.5   GLUCOSE UA mg/dl Negative   KETONES UA mg/dl Negative   BLOOD UA  Negative   PROTEIN UA mg/dl Negative   NITRITE UA  Negative   BILIRUBIN UA  Negative   UROBILINOGEN UA E.U./dl 0.2   LEUKOCYTES UA  Negative     Results from last 7 days   Lab Units 02/07/24  1721   INFLUENZA A PCR  Negative   INFLUENZA B PCR  Negative   RSV PCR  Negative         Medications:   Scheduled Medications:  vitamin C, 1,000 mg, Oral, BID  cholecalciferol, 1,000 Units, Oral, BID  cyanocobalamin, 100 mcg, Oral, Daily  famotidine, 40 mg, Oral, Daily  folic acid, 1 mg, Oral, Daily  hydrOXYzine HCL, 15 mg, Oral, Daily  levothyroxine, 75 mcg, Oral, Every Other Day  liothyronine, 5 mcg, Oral, Daily  loratadine, 10 mg, Oral, Daily  Magnesium Gluconate, 500 mg, Oral, BID  mirtazapine, 15 mg, Oral, HS  montelukast, 10 mg, Oral, HS  niacin, 100 mg, Oral, BID With Meals  predniSONE, 10 mg, Oral, Daily      Continuous IV Infusions:  sodium chloride, 75 mL/hr, Intravenous, Continuous      PRN  Meds:  butalbital-acetaminophen-caffeine, 1 tablet, Oral, Q4H PRN  diphenhydrAMINE, 25 mg, Intravenous, Q4H PRN  HYDROmorphone, 0.5 mg, Intravenous, Q4H PRN  HYDROmorphone, 0.5 mg, Intravenous, Q4H PRN  ondansetron, 4 mg, Intravenous, Q6H PRN        Discharge Plan: to be deetermined     Network Utilization Review Department  ATTENTION: Please call with any questions or concerns to [phone] and carefully listen to the prompts so that you are directed to the right person. All voicemails are confidential.   For Discharge needs, contact Care Management DC Support Team at [phone] opt. [phone]  Send all requests for admission clinical reviews, approved or denied determinations and any other requests to dedicated fax number below belonging to the campus where the patient is receiving treatment. List of dedicated fax numbers for the Facilities:  FACILITY NAME UR FAX NUMBER   ADMISSION DENIALS (Administrative/Medical Necessity) [phone]   DISCHARGE SUPPORT TEAM (NETWORK) [phone]   PARENT CHILD HEALTH (Maternity/NICU/Pediatrics) [phone]   [facility] [phone]   [facility] [phone]   [facility] [phone]   [facility] [phone]   [facility] [phone]   [facility] [phone]   [facility] [phone]   [facility] [phone]   [facility] [phone]   [facility] [phone]   [facility] [phone]   [facility] [phone]

## 2024-02-10 NOTE — ASSESSMENT & PLAN NOTE
Presented to the emergency room with complaints of abdominal pain  Patient reports pain in right upper quadrant with radiation to right back  Associated with nausea and dry heaving, denies diarrhea.  Patient reports subjective fever on Monday  CT shows-  Intrahepatic and extrahepatic biliary ductal dilation, similar to prior MRI and greater than expected solely on the basis of a postcholecystectomy state. Repeat MRI may be performed to assess for underlying stricture or obstructing lesion. Alternatively, ERCP/EUS may be performed if clinically indicated.  Received Pain medication in the ER with little relief   MRI consistent with CT scan, no specific blockage seen  Discussed with GI, they will be discussing with the advanced team on Monday to see if patient can get EUS/ERCP  Continue IV fluids  Pain medication PRN  Zofran PRN  GI input appreciated

## 2024-02-10 NOTE — PLAN OF CARE
Problem: PAIN - ADULT  Goal: Verbalizes/displays adequate comfort level or baseline comfort level  Description: Interventions:  - Encourage patient to monitor pain and request assistance  - Assess pain using appropriate pain scale  - Administer analgesics based on type and severity of pain and evaluate response  - Implement non-pharmacological measures as appropriate and evaluate response  - Consider cultural and social influences on pain and pain management  - Notify physician/advanced practitioner if interventions unsuccessful or patient reports new pain  Outcome: Progressing     Problem: INFECTION - ADULT  Goal: Absence or prevention of progression during hospitalization  Description: INTERVENTIONS:  - Assess and monitor for signs and symptoms of infection  - Monitor lab/diagnostic results  - Monitor all insertion sites, i.e. indwelling lines  - Monitor endotracheal if appropriate and nasal secretions for changes in amount and color  - [place] appropriate cooling/warming therapies per order  - Administer medications as ordered  - Instruct and encourage patient and family to use good hand hygiene technique  - Identify and instruct in appropriate isolation precautions for identified infection/condition  Outcome: Progressing     Problem: SAFETY ADULT  Goal: Patient will remain free of falls  Description: INTERVENTIONS:  - Educate patient/family on patient safety including physical limitations  - Instruct patient to call for assistance with activity   - Consult OT/PT to assist with strengthening/mobility   - Keep Call bell within reach  - Keep bed low and locked with side rails adjusted as appropriate  - Keep care items and personal belongings within reach  - Initiate and maintain comfort rounds  - Make Fall Risk Sign visible to staff  - Offer Toileting every 4 Hours, in advance of need  - Initiate/Maintain bed alarm  - Obtain necessary fall risk management equipment  - Apply yellow socks and bracelet for high  fall risk patients  - Consider moving patient to room near nurses station  Outcome: Progressing  Goal: Maintain or return to baseline ADL function  Description: INTERVENTIONS:  -  Assess patient's ability to carry out ADLs; assess patient's baseline for ADL function and identify physical deficits which impact ability to perform ADLs (bathing, care of mouth/teeth, toileting, grooming, dressing, etc.)  - Assess/evaluate cause of self-care deficits   - Assess range of motion  - Assess patient's mobility; develop plan if impaired  - Assess patient's need for assistive devices and provide as appropriate  - Encourage maximum independence but intervene and supervise when necessary  - Involve family in performance of ADLs  - Assess for home care needs following discharge   - Consider OT consult to assist with ADL evaluation and planning for discharge  - Provide patient education as appropriate  Outcome: Progressing  Goal: Maintains/Returns to pre admission functional level  Description: INTERVENTIONS:  - Perform AM-PAC 6 Click Basic Mobility/ Daily Activity assessment daily.  - Set and communicate daily mobility goal to care team and patient/family/caregiver.   - Collaborate with rehabilitation services on mobility goals if consulted  - Perform Range of Motion 3 times a day.  - Reposition patient every 3 hours.  - Dangle patient 3 times a day  - Stand patient 3 times a day  - Ambulate patient 3 times a day  - Out of bed to chair 3 times a day   - Out of bed for meals 3 times a day  - Out of bed for toileting  - Record patient progress and toleration of activity level   Outcome: Progressing     Problem: Nutrition/Hydration-ADULT  Goal: Nutrient/Hydration intake appropriate for improving, restoring or maintaining nutritional needs  Description: Monitor and assess patient's nutrition/hydration status for malnutrition. Collaborate with interdisciplinary team and initiate plan and interventions as ordered.  Monitor patient's  weight and dietary intake as ordered or per policy. Utilize nutrition screening tool and intervene as necessary. Determine patient's food preferences and provide high-protein, high-caloric foods as appropriate.     INTERVENTIONS:  - Monitor oral intake, urinary output, labs, and treatment plans  - Assess nutrition and hydration status and recommend course of action  - Evaluate amount of meals eaten  - Assist patient with eating if necessary   - Allow adequate time for meals  - Recommend/ encourage appropriate diets, oral nutritional supplements, and vitamin/mineral supplements  - Order, calculate, and assess calorie counts as needed  - Recommend, monitor, and adjust tube feedings and TPN/PPN based on assessed needs  - Assess need for intravenous fluids  - Provide specific nutrition/hydration education as appropriate  - Include patient/family/caregiver in decisions related to nutrition  Outcome: Progressing     Problem: GASTROINTESTINAL - ADULT  Goal: Minimal or absence of nausea and/or vomiting  Description: INTERVENTIONS:  - Administer IV fluids if ordered to ensure adequate hydration  - Maintain NPO status until nausea and vomiting are resolved  - Nasogastric tube if ordered  - Administer ordered antiemetic medications as needed  - Provide nonpharmacologic comfort measures as appropriate  - Advance diet as tolerated, if ordered  - Consider nutrition services referral to assist patient with adequate nutrition and appropriate food choices  Outcome: Progressing  Goal: Maintains or returns to baseline bowel function  Description: INTERVENTIONS:  - Assess bowel function  - Encourage oral fluids to ensure adequate hydration  - Administer IV fluids if ordered to ensure adequate hydration  - Administer ordered medications as needed  - Encourage mobilization and activity  - Consider nutritional services referral to assist patient with adequate nutrition and appropriate food choices  Outcome: Progressing  Goal: Maintains  adequate nutritional intake  Description: INTERVENTIONS:  - Monitor percentage of each meal consumed  - Identify factors contributing to decreased intake, treat as appropriate  - Assist with meals as needed  - Monitor I&O, weight, and lab values if indicated  - Obtain nutrition services referral as needed  Outcome: Progressing     Problem: METABOLIC, FLUID AND ELECTROLYTES - ADULT  Goal: Electrolytes maintained within normal limits  Description: INTERVENTIONS:  - Monitor labs and assess patient for signs and symptoms of electrolyte imbalances  - Administer electrolyte replacement as ordered  - Monitor response to electrolyte replacements, including repeat lab results as appropriate  - Instruct patient on fluid and nutrition as appropriate  Outcome: Progressing  Goal: Fluid balance maintained  Description: INTERVENTIONS:  - Monitor labs   - Monitor I/O and WT  - Instruct patient on fluid and nutrition as appropriate  - Assess for signs & symptoms of volume excess or deficit  Outcome: Progressing     Problem: Prexisting or High Potential for Compromised Skin Integrity  Goal: Skin integrity is maintained or improved  Description: INTERVENTIONS:  - Identify patients at risk for skin breakdown  - Assess and monitor skin integrity  - Assess and monitor nutrition and hydration status  - Monitor labs   - Assess for incontinence   - Turn and reposition patient  - Assist with mobility/ambulation  - Relieve pressure over bony prominences  - Avoid friction and shearing  - Provide appropriate hygiene as needed including keeping skin clean and dry  - Evaluate need for skin moisturizer/barrier cream  - Collaborate with interdisciplinary team   - Patient/family teaching  - Consider wound care consult   Outcome: Progressing

## 2024-02-10 NOTE — PROGRESS NOTES
[facility]  Progress Note  Name: Celine Roa I  MRN: [id number]  Unit/Bed#: 402-01 I Date of Admission: 2/7/2024   Date of Service: 2/10/2024 I Hospital Day: 1    Assessment/Plan   Crohn's colitis (HCC)  Assessment & Plan  History of Crohn's  No evidence of acute flare  Continue PTA medication  Continue GI follow-up    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HCC)  Assessment & Plan  Continue PTA medication  Continue outpatient hematology, immunology follow-up    Bladder spasm  Assessment & Plan  Reports decreased urine output, having to push to urinate, and bladder spasms  No significant amounts of urine on bladder scan  No hydroureteronephrosis on renal US  Discussed with urology, can trial ditropan but will have to continue with bladder scans    Systemic lupus erythematosus, unspecified SLE type, unspecified organ involvement status (HCC)  Assessment & Plan  Continue PTA medication    Acquired hypothyroidism  Assessment & Plan  Continue levothyroxine    Headache  Assessment & Plan  Ongoing headache at this time  Patient requesting fioricet - improved with this  Reports resolved 2/10    * Intractable right upper quadrant abdominal pain  Assessment & Plan  Presented to the emergency room with complaints of abdominal pain  Patient reports pain in right upper quadrant with radiation to right back  Associated with nausea and dry heaving, denies diarrhea.  Patient reports subjective fever on Monday  CT shows-  Intrahepatic and extrahepatic biliary ductal dilation, similar to prior MRI and greater than expected solely on the basis of a postcholecystectomy state. Repeat MRI may be performed to assess for underlying stricture or obstructing lesion. Alternatively, ERCP/EUS may be performed if clinically indicated.  Received Pain medication in the ER with little relief   MRI consistent with CT scan, no specific blockage seen  Discussed with GI, they will be discussing with the advanced team on Monday to see  if patient can get EUS/ERCP  Continue IV fluids  Pain medication PRN  Zofran PRN  GI input appreciated               VTE Pharmacologic Prophylaxis:   Moderate Risk (Score 3-4) - Pharmacological DVT Prophylaxis Contraindicated. Sequential Compression Devices Ordered.    Mobility:   Basic Mobility Inpatient Raw Score: 20  JH-HLM Goal: 6: Walk 10 steps or more  JH-HLM Achieved: 7: Walk 25 feet or more  HLM Goal achieved. Continue to encourage appropriate mobility.    Patient Centered Rounds: I performed bedside rounds with nursing staff today.   Discussions with Specialists or Other Care Team Provider: case management, GI, urology    Education and Discussions with Family / Patient: Patient declined call to .     Total Time Spent on Date of Encounter in care of patient: 47 mins. This time was spent on one or more of the following: performing physical exam; counseling and coordination of care; obtaining or reviewing history; documenting in the medical record; reviewing/ordering tests, medications or procedures; communicating with other healthcare professionals and discussing with patient's family/caregivers.    Current Length of Stay: 1 day(s)  Current Patient Status: Inpatient   Certification Statement: The patient will continue to require additional inpatient hospital stay due to uncontrolled pain  Discharge Plan: Anticipate discharge in 48-72 hrs to home.    Code Status: Level 1 - Full Code    Subjective:   Patient reports having bladder pain and difficulty urinating now. Still having RUQ abdominal pain     Objective:     Vitals:   Temp (24hrs), Av °F (36.7 °C), Min:97.8 °F (36.6 °C), Max:98.3 °F (36.8 °C)    Temp:  [97.8 °F (36.6 °C)-98.3 °F (36.8 °C)] 97.9 °F (36.6 °C)  HR:  [77-83] 78  Resp:  [16] 16  BP: ()/(55-71) 91/55  SpO2:  [95 %-98 %] 95 %  Body mass index is 26.6 kg/m².     Input and Output Summary (last 24 hours):     Intake/Output Summary (Last 24 hours) at 2/10/2024 1326  Last  data filed at 2/10/2024 1221  Gross per 24 hour   Intake 540 ml   Output 600 ml   Net -60 ml       Physical Exam:   Physical Exam  Vitals and nursing note reviewed.   Constitutional:       General: She is not in acute distress.     Appearance: She is obese. She is ill-appearing.   Cardiovascular:      Rate and Rhythm: Normal rate and regular rhythm.      Pulses: Normal pulses.      Heart sounds: Normal heart sounds.   Pulmonary:      Effort: Pulmonary effort is normal.      Breath sounds: Normal breath sounds.   Abdominal:      General: Bowel sounds are normal.      Palpations: Abdomen is soft.      Tenderness: There is abdominal tenderness. There is no right CVA tenderness or left CVA tenderness.   Musculoskeletal:      Right lower leg: No edema.      Left lower leg: No edema.   Neurological:      Mental Status: She is alert. Mental status is at baseline.   Psychiatric:         Mood and Affect: Mood normal.         Behavior: Behavior normal.          Additional Data:     Labs:  Results from last 7 days   Lab Units 02/09/24  0515   WBC Thousand/uL 6.28   HEMOGLOBIN g/dL 13.9   HEMATOCRIT % 42.9   PLATELETS Thousands/uL 374   NEUTROS PCT % 50   LYMPHS PCT % 32   MONOS PCT % 9   EOS PCT % 6     Results from last 7 days   Lab Units 02/09/24  0515 02/08/24  0552   SODIUM mmol/L 137 139   POTASSIUM mmol/L 4.0 4.1   CHLORIDE mmol/L 105 106   CO2 mmol/L 25 28   BUN mg/dL 7 8   CREATININE mg/dL 0.68 0.69   ANION GAP mmol/L 7 5   CALCIUM mg/dL 7.9* 7.9*   ALBUMIN g/dL  --  3.6   TOTAL BILIRUBIN mg/dL  --  0.49   ALK PHOS U/L  --  48   ALT U/L  --  23   AST U/L  --  13   GLUCOSE RANDOM mg/dL 71 74     Results from last 7 days   Lab Units 02/07/24  1746   INR  1.00             Results from last 7 days   Lab Units 02/07/24  1721   LACTIC ACID mmol/L 1.3       Lines/Drains:  Invasive Devices       Peripheral Intravenous Line  Duration             Peripheral IV 02/07/24 Upper;Ventral (anterior);Left Arm 2 days              Line   Duration             Pump Device  -- days                          Imaging: No pertinent imaging reviewed.    Recent Cultures (last 7 days):         Last 24 Hours Medication List:   Current Facility-Administered Medications   Medication Dose Route Frequency Provider Last Rate    vitamin C  1,000 mg Oral BID Gilberto Martinez PA-C      butalbital-acetaminophen-caffeine  1 tablet Oral Q4H PRN Teresa Nuñez PA-C      cholecalciferol  1,000 Units Oral BID Gilberto Martinez, RANDEE      cyanocobalamin  100 mcg Oral Daily Gilberto Martinez, RANDEE      diphenhydrAMINE  25 mg Intravenous Q4H PRN Teresa Nuñez, RANDEE      famotidine  40 mg Oral Daily Gilberto Martinez, RANDEE      folic acid  1 mg Oral Daily Gilberto Martinez, RANDEE      HYDROmorphone  0.5 mg Intravenous Q4H PRN Teresa Nuñez, PA-DAYNA      HYDROmorphone  0.5 mg Intravenous Q4H PRN Teresa Nuñez, RANDEE      hydrOXYzine HCL  15 mg Oral Daily Gilberto Martinez, RANDEE      levothyroxine  75 mcg Oral Every Other Day Gilberto Martinez, RANDEE      liothyronine  5 mcg Oral Daily Gilebrto Martinez, RANDEE      loratadine  10 mg Oral Daily Gilberto Martinez, RANDEE      Magnesium Gluconate  500 mg Oral BID Gilberto Martinez, RANDEE      mirtazapine  15 mg Oral HS Gilberto Martinez, PA-DAYNA      montelukast  10 mg Oral HS Gilberto Martinez, PA-DAYNA      niacin  100 mg Oral BID With Meals Gilberto Martinez, RANDEE      ondansetron  4 mg Intravenous Q6H PRN Gilberto Martinez, RANDEE      oxybutynin  5 mg Oral TID Teresa Nuñez PA-C      predniSONE  10 mg Oral Daily Gilberto Martinez, RANDEE      sodium chloride  75 mL/hr Intravenous Continuous Gilberto Martinez PA-C 75 mL/hr (02/10/24 0241)        Today, Patient Was Seen By: Teresa Nuñez PA-C    **Please Note: This note may have been constructed using a voice recognition system.**

## 2024-02-10 NOTE — ASSESSMENT & PLAN NOTE
Reports decreased urine output, having to push to urinate, and bladder spasms  No significant amounts of urine on bladder scan  No hydroureteronephrosis on renal US  Discussed with urology, can trial ditropan but will have to continue with bladder scans

## 2024-02-11 LAB
ALBUMIN SERPL BCP-MCNC: 3.6 G/DL (ref 3.5–5)
ALP SERPL-CCNC: 46 U/L (ref 34–104)
ALT SERPL W P-5'-P-CCNC: 20 U/L (ref 7–52)
ANION GAP SERPL CALCULATED.3IONS-SCNC: 8 MMOL/L
AST SERPL W P-5'-P-CCNC: 17 U/L (ref 13–39)
BASOPHILS # BLD AUTO: 0.1 THOUSANDS/ÂΜL (ref 0–0.1)
BASOPHILS NFR BLD AUTO: 1 % (ref 0–1)
BILIRUB SERPL-MCNC: 0.44 MG/DL (ref 0.2–1)
BUN SERPL-MCNC: 2 MG/DL (ref 5–25)
CALCIUM SERPL-MCNC: 7.6 MG/DL (ref 8.4–10.2)
CHLORIDE SERPL-SCNC: 107 MMOL/L (ref 96–108)
CO2 SERPL-SCNC: 23 MMOL/L (ref 21–32)
CREAT SERPL-MCNC: 0.63 MG/DL (ref 0.6–1.3)
EOSINOPHIL # BLD AUTO: 0.27 THOUSAND/ÂΜL (ref 0–0.61)
EOSINOPHIL NFR BLD AUTO: 4 % (ref 0–6)
ERYTHROCYTE [DISTWIDTH] IN BLOOD BY AUTOMATED COUNT: 14 % (ref 11.6–15.1)
GFR SERPL CREATININE-BSD FRML MDRD: 104 ML/MIN/1.73SQ M
GLUCOSE SERPL-MCNC: 96 MG/DL (ref 65–140)
HCT VFR BLD AUTO: 43.7 % (ref 34.8–46.1)
HGB BLD-MCNC: 14.1 G/DL (ref 11.5–15.4)
IMM GRANULOCYTES # BLD AUTO: 0.03 THOUSAND/UL (ref 0–0.2)
IMM GRANULOCYTES NFR BLD AUTO: 0 % (ref 0–2)
LYMPHOCYTES # BLD AUTO: 1.91 THOUSANDS/ÂΜL (ref 0.6–4.47)
LYMPHOCYTES NFR BLD AUTO: 27 % (ref 14–44)
MCH RBC QN AUTO: 32.3 PG (ref 26.8–34.3)
MCHC RBC AUTO-ENTMCNC: 32.3 G/DL (ref 31.4–37.4)
MCV RBC AUTO: 100 FL (ref 82–98)
MONOCYTES # BLD AUTO: 0.77 THOUSAND/ÂΜL (ref 0.17–1.22)
MONOCYTES NFR BLD AUTO: 11 % (ref 4–12)
NEUTROPHILS # BLD AUTO: 4.13 THOUSANDS/ÂΜL (ref 1.85–7.62)
NEUTS SEG NFR BLD AUTO: 57 % (ref 43–75)
NRBC BLD AUTO-RTO: 0 /100 WBCS
PLATELET # BLD AUTO: 380 THOUSANDS/UL (ref 149–390)
PMV BLD AUTO: 8.6 FL (ref 8.9–12.7)
POTASSIUM SERPL-SCNC: 3.4 MMOL/L (ref 3.5–5.3)
PROT SERPL-MCNC: 5.8 G/DL (ref 6.4–8.4)
RBC # BLD AUTO: 4.36 MILLION/UL (ref 3.81–5.12)
SODIUM SERPL-SCNC: 138 MMOL/L (ref 135–147)
WBC # BLD AUTO: 7.21 THOUSAND/UL (ref 4.31–10.16)

## 2024-02-11 PROCEDURE — 99232 SBSQ HOSP IP/OBS MODERATE 35: CPT | Performed by: PHYSICIAN ASSISTANT

## 2024-02-11 PROCEDURE — 85025 COMPLETE CBC W/AUTO DIFF WBC: CPT | Performed by: PHYSICIAN ASSISTANT

## 2024-02-11 PROCEDURE — 80053 COMPREHEN METABOLIC PANEL: CPT | Performed by: PHYSICIAN ASSISTANT

## 2024-02-11 RX ORDER — FAMOTIDINE 10 MG/ML
20 INJECTION, SOLUTION INTRAVENOUS EVERY 12 HOURS SCHEDULED
Status: DISCONTINUED | OUTPATIENT
Start: 2024-02-11 | End: 2024-02-13 | Stop reason: HOSPADM

## 2024-02-11 RX ORDER — METHYLPREDNISOLONE SODIUM SUCCINATE 40 MG/ML
40 INJECTION, POWDER, LYOPHILIZED, FOR SOLUTION INTRAMUSCULAR; INTRAVENOUS DAILY
Status: DISCONTINUED | OUTPATIENT
Start: 2024-02-11 | End: 2024-02-13 | Stop reason: HOSPADM

## 2024-02-11 RX ORDER — OXYBUTYNIN CHLORIDE 5 MG/1
5 TABLET ORAL 2 TIMES DAILY PRN
Status: DISCONTINUED | OUTPATIENT
Start: 2024-02-11 | End: 2024-02-13 | Stop reason: HOSPADM

## 2024-02-11 RX ADMIN — ONDANSETRON 4 MG: 2 INJECTION INTRAMUSCULAR; INTRAVENOUS at 01:00

## 2024-02-11 RX ADMIN — DIPHENHYDRAMINE HYDROCHLORIDE 50 MG: 50 INJECTION, SOLUTION INTRAMUSCULAR; INTRAVENOUS at 18:13

## 2024-02-11 RX ADMIN — HYDROMORPHONE HYDROCHLORIDE 0.5 MG: 1 INJECTION, SOLUTION INTRAMUSCULAR; INTRAVENOUS; SUBCUTANEOUS at 01:37

## 2024-02-11 RX ADMIN — HYDROMORPHONE HYDROCHLORIDE 0.5 MG: 1 INJECTION, SOLUTION INTRAMUSCULAR; INTRAVENOUS; SUBCUTANEOUS at 05:37

## 2024-02-11 RX ADMIN — DIPHENHYDRAMINE HYDROCHLORIDE 50 MG: 50 INJECTION, SOLUTION INTRAMUSCULAR; INTRAVENOUS at 09:54

## 2024-02-11 RX ADMIN — FAMOTIDINE 20 MG: 10 INJECTION, SOLUTION INTRAVENOUS at 14:05

## 2024-02-11 RX ADMIN — DIPHENHYDRAMINE HYDROCHLORIDE 50 MG: 50 INJECTION, SOLUTION INTRAMUSCULAR; INTRAVENOUS at 14:07

## 2024-02-11 RX ADMIN — SODIUM CHLORIDE 75 ML/HR: 0.9 INJECTION, SOLUTION INTRAVENOUS at 18:29

## 2024-02-11 RX ADMIN — HYDROMORPHONE HYDROCHLORIDE 0.5 MG: 1 INJECTION, SOLUTION INTRAMUSCULAR; INTRAVENOUS; SUBCUTANEOUS at 09:54

## 2024-02-11 RX ADMIN — HYDROMORPHONE HYDROCHLORIDE 0.5 MG: 1 INJECTION, SOLUTION INTRAMUSCULAR; INTRAVENOUS; SUBCUTANEOUS at 22:17

## 2024-02-11 RX ADMIN — HYDROMORPHONE HYDROCHLORIDE 0.5 MG: 1 INJECTION, SOLUTION INTRAMUSCULAR; INTRAVENOUS; SUBCUTANEOUS at 18:13

## 2024-02-11 RX ADMIN — HYDROMORPHONE HYDROCHLORIDE 0.5 MG: 1 INJECTION, SOLUTION INTRAMUSCULAR; INTRAVENOUS; SUBCUTANEOUS at 19:23

## 2024-02-11 RX ADMIN — METHYLPREDNISOLONE SODIUM SUCCINATE 40 MG: 40 INJECTION, POWDER, FOR SOLUTION INTRAMUSCULAR; INTRAVENOUS at 14:08

## 2024-02-11 RX ADMIN — FAMOTIDINE 20 MG: 10 INJECTION, SOLUTION INTRAVENOUS at 21:55

## 2024-02-11 RX ADMIN — HYDROMORPHONE HYDROCHLORIDE 0.5 MG: 1 INJECTION, SOLUTION INTRAMUSCULAR; INTRAVENOUS; SUBCUTANEOUS at 10:57

## 2024-02-11 RX ADMIN — HYDROMORPHONE HYDROCHLORIDE 0.5 MG: 1 INJECTION, SOLUTION INTRAMUSCULAR; INTRAVENOUS; SUBCUTANEOUS at 14:08

## 2024-02-11 RX ADMIN — DIPHENHYDRAMINE HYDROCHLORIDE 50 MG: 50 INJECTION, SOLUTION INTRAMUSCULAR; INTRAVENOUS at 22:17

## 2024-02-11 RX ADMIN — SODIUM CHLORIDE 75 ML/HR: 0.9 INJECTION, SOLUTION INTRAVENOUS at 05:28

## 2024-02-11 RX ADMIN — DIPHENHYDRAMINE HYDROCHLORIDE 50 MG: 50 INJECTION, SOLUTION INTRAMUSCULAR; INTRAVENOUS at 05:30

## 2024-02-11 NOTE — TREATMENT PLAN
Celine Roa  1975  [id number]  02/11/24         Urology Interim Treatment Plan        Celine Roa is a 48-year-old female well-known to our service for history of bilateral nephrolithiasis, gross hematuria and pelvic organ prolapse with grade 3 cystocele and rectocele.  She has a complicated medical history which includes mast cell syndrome and lupus.  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  Of note, pathology report from intraoperative sample obtained was negative for high-grade dysplasia or carcinoma.  She has chronic lower urinary tract symptoms and occasional UTIs.  She is admitted for chief complaint of right upper quadrant abdominal pain.  Gastroenterology consultation.  Our service was contacted against complaint of stranguria, not accompanied by fever, chills, gross hematuria, nausea/vomiting.  Bladder scans were negligible.  She has undergone multiple imaging modalities including ultrasound, CT and MRI which are negative for  tract obstruction or other  tract related abnormality.      Plan:  Continue medical optimization and treatment for primary concern.  Monitor voiding with serial PVRs or bladder scans if patient is incontinent.  May begin low-dose Ditropan with caution.  Patient is not infected nor obstructed.  No indication for urgent urologic consultation, acute or emergent  surgical instrumentation.  Patient will be seen in formal urologic consultation nonurgently when staff is available during the week.      Vitals:    02/11/24 1451   BP: 96/70   Pulse: 84   Resp: 16   Temp: 98.5 °F (36.9 °C)   SpO2: 98%               Lab Results   Component Value Date    WBC 7.21 02/11/2024    HGB 14.1 02/11/2024    HCT 43.7 02/11/2024     (H) 02/11/2024     02/11/2024     Lab Results   Component Value Date    SODIUM 138 02/11/2024    K 3.4 (L) 02/11/2024     02/11/2024    CO2 23 02/11/2024     BUN 2 (L) 02/11/2024    CREATININE 0.63 02/11/2024    GLUC 96 02/11/2024    CALCIUM 7.6 (L) 02/11/2024       Image Report      Procedure Component Value Units Date/Time   US kidney and bladder [[id number]] Collected: 02/09/24 1949   Order Status: Completed Updated: 02/09/24 1954   Narrative:     RENAL ULTRASOUND    INDICATION: not emptying bladder, not accurate on bladder scan.    COMPARISON: Chest, abdomen, pelvic CT from 2/7/2024.    TECHNIQUE: Ultrasound of the retroperitoneum was performed with a curvilinear transducer utilizing volumetric sweeps and still imaging techniques.    FINDINGS:    KIDNEYS:  Symmetric and normal size.  Right kidney: 9.6 x 4.4 x 4.6 cm. Volume 100.8 mL  Left kidney: 11.4 x 5.2 x 5.0 cm. Volume 153.3 mL    Right kidney  Normal echogenicity and contour.  No mass is identified.  No hydronephrosis.  No shadowing calculi.  No perinephric fluid collections.    Left kidney  Normal echogenicity and contour.  No mass is identified.  No hydronephrosis.  No shadowing calculi.  No perinephric fluid collections.    URETERS:  Nonvisualized.    BLADDER:  Normally distended. Bladder volume was 180 mL.  No focal thickening or mass lesions.  Bilateral ureteral jets detected.     Impression:       Normal renal ultrasound.        Workstation performed: [id number]   MRI abdomen wo contrast and mrcp [[id number]] Collected: 02/09/24 1940   Order Status: Completed Updated: 02/09/24 1950   Narrative:     MRI OF THE ABDOMEN WITHOUT CONTRAST WITH MRCP    INDICATION: 48 years / Female. stones.    COMPARISON: Abdomen MR from 7/21/2023, and chest, abdomen, pelvic CT from 3/7/2024    TECHNIQUE: Multiplanar/multisequence MRI of the abdomen with 3D MRCP was performed without the administration of contrast.    FINDINGS:    LOWER CHEST: Unremarkable.    LIVER:  Normal in size and configuration.  No suspicious mass.  Limited evaluation of hepatic and portal veins on this non-contrast MRI is unremarkable.    BILE DUCTS:  Unchanged chronic intra and extrahepatic biliary ductal dilatation to the level of the ampulla, without evidence of common bile duct stone or obstructing mass.    There is an anatomic variant triple confluence of the intrahepatic ducts.    GALLBLADDER: Normal.    PANCREAS: Unremarkable.    ADRENAL GLANDS: Unremarkable.    SPLEEN: Normal.    KIDNEYS/PROXIMAL URETERS: No hydroureteronephrosis. No suspicious renal mass.    BOWEL: No dilated loops of bowel.    PERITONEAL CAVITY/RETROPERITONEUM: No ascites.    LYMPH NODES: No abdominal lymphadenopathy.    VESSELS: No aneurysm.    ABDOMINAL WALL: Unremarkable    BONES: No suspicious osseous lesion.   Impression:       Unchanged chronic intra and extrahepatic biliary ductal dilatation to the level of the ampulla, without evidence of common bile duct stone or obstructing mass.      Workstation performed: [id number]   CT chest abdomen pelvis wo contrast [[id number]] Collected: 02/07/24 1900   Order Status: Completed Updated: 02/07/24 1929   Narrative:     CT CHEST, ABDOMEN AND PELVIS WITHOUT IV CONTRAST    INDICATION: abd pain, RUQ pain.    COMPARISON: CT chest/abdomen/pelvis 7/18/2023. MRI abdomen 7/21/2023. Ultrasound abdomen and pelvis 1/29/2024.    TECHNIQUE: CT examination of the chest, abdomen and pelvis was performed without intravenous contrast. Multiplanar 2D reformatted images were created from the source data.    This examination, like all CT scans performed in the [facility], was performed utilizing techniques to minimize radiation dose exposure, including the use of iterative reconstruction and automated exposure control. Radiation dose length  product (DLP) for this visit: 650.1 mGy-cm    Enteric Contrast: Not administered.    FINDINGS:    CHEST    LUNGS: Lungs are clear. Scattered calcified granulomas. No tracheal or endobronchial lesion.    PLEURA: Unremarkable.    HEART/GREAT VESSELS: Heart is unremarkable for patient's age. The ascending  aorta measures up to 3.7 cm (2/48), unchanged.    MEDIASTINUM AND CHRIS: Unremarkable.    CHEST WALL AND LOWER NECK: Diminutive thyroid.    ABDOMEN    LIVER/BILIARY TREE: Redemonstrated mild intrahepatic biliary ductal dilation with the extrahepatic bile duct measuring up to 1.6 cm (604/64), previously 1.5 cm on MRI 7/21/2023. Degree of common bile duct dilation is greater than expected for  postcholecystectomy state.    GALLBLADDER: Post cholecystectomy.    SPLEEN: Unremarkable.    PANCREAS: Unremarkable. Top normal in caliber pancreatic duct measuring up to 3 mm in the pancreatic head, similar to prior study.    ADRENAL GLANDS: Unremarkable.    KIDNEYS/URETERS: Bilateral nonobstructing renal calculi measuring up to 3 mm in the right upper pole and 3 mm in the left lower pole.. No hydronephrosis.    STOMACH AND BOWEL: Unremarkable.    APPENDIX: Surgically absent.    ABDOMINOPELVIC CAVITY: No ascites. No pneumoperitoneum. No lymphadenopathy.    VESSELS: Unremarkable for patient's age.    PELVIS    REPRODUCTIVE ORGANS: Hysterectomy. No adnexal mass.    URINARY BLADDER: Unremarkable.    ABDOMINAL WALL/INGUINAL REGIONS: Small fat-containing umbilical hernia.    BONES: Mild superior endplate deformity of L3, new from 7/18/2023 and favored to be degenerative in etiology.   Impression:       1.  Intrahepatic and extrahepatic biliary ductal dilation, similar to prior MRI and greater than expected solely on the basis of a postcholecystectomy state. Repeat MRI may be performed to assess for underlying stricture or obstructing lesion.  Alternatively, ERCP/EUS may be performed if clinically indicated.  2.  Mild superior endplate deformity of the L3 vertebral body, new from 7/18/2023 and favored to be degenerative in etiology.  3.  Bilateral nonobstructing renal calculi.    The study was marked in EPIC for immediate notification.             JONATHAN Lee

## 2024-02-11 NOTE — PLAN OF CARE
Problem: PAIN - ADULT  Goal: Verbalizes/displays adequate comfort level or baseline comfort level  Description: Interventions:  - Encourage patient to monitor pain and request assistance  - Assess pain using appropriate pain scale  - Administer analgesics based on type and severity of pain and evaluate response  - Implement non-pharmacological measures as appropriate and evaluate response  - Consider cultural and social influences on pain and pain management  - Notify physician/advanced practitioner if interventions unsuccessful or patient reports new pain  Outcome: Progressing     Problem: INFECTION - ADULT  Goal: Absence or prevention of progression during hospitalization  Description: INTERVENTIONS:  - Assess and monitor for signs and symptoms of infection  - Monitor lab/diagnostic results  - Monitor all insertion sites, i.e. indwelling lines  - Monitor endotracheal if appropriate and nasal secretions for changes in amount and color  - [place] appropriate cooling/warming therapies per order  - Administer medications as ordered  - Instruct and encourage patient and family to use good hand hygiene technique  - Identify and instruct in appropriate isolation precautions for identified infection/condition  Outcome: Progressing     Problem: SAFETY ADULT  Goal: Patient will remain free of falls  Description: INTERVENTIONS:  - Educate patient/family on patient safety including physical limitations  - Instruct patient to call for assistance with activity   - Consult OT/PT to assist with strengthening/mobility   - Keep Call bell within reach  - Keep bed low and locked with side rails adjusted as appropriate  - Keep care items and personal belongings within reach  - Initiate and maintain comfort rounds  - Make Fall Risk Sign visible to staff  - Offer Toileting every 4 Hours, in advance of need  - Initiate/Maintain bed alarm  - Obtain necessary fall risk management equipment  - Apply yellow socks and bracelet for high  fall risk patients  - Consider moving patient to room near nurses station  Outcome: Progressing  Goal: Maintain or return to baseline ADL function  Description: INTERVENTIONS:  -  Assess patient's ability to carry out ADLs; assess patient's baseline for ADL function and identify physical deficits which impact ability to perform ADLs (bathing, care of mouth/teeth, toileting, grooming, dressing, etc.)  - Assess/evaluate cause of self-care deficits   - Assess range of motion  - Assess patient's mobility; develop plan if impaired  - Assess patient's need for assistive devices and provide as appropriate  - Encourage maximum independence but intervene and supervise when necessary  - Involve family in performance of ADLs  - Assess for home care needs following discharge   - Consider OT consult to assist with ADL evaluation and planning for discharge  - Provide patient education as appropriate  Outcome: Progressing  Goal: Maintains/Returns to pre admission functional level  Description: INTERVENTIONS:  - Perform AM-PAC 6 Click Basic Mobility/ Daily Activity assessment daily.  - Set and communicate daily mobility goal to care team and patient/family/caregiver.   - Collaborate with rehabilitation services on mobility goals if consulted  - Perform Range of Motion three times a day.  - Dangle patient three times a day  - Stand patient three times a day  - Ambulate patient three times a day  - Out of bed to chair three times a day   - Out of bed for meals three times a day  - Out of bed for toileting  - Record patient progress and toleration of activity level   Outcome: Progressing     Problem: Nutrition/Hydration-ADULT  Goal: Nutrient/Hydration intake appropriate for improving, restoring or maintaining nutritional needs  Description: Monitor and assess patient's nutrition/hydration status for malnutrition. Collaborate with interdisciplinary team and initiate plan and interventions as ordered.  Monitor patient's weight and  dietary intake as ordered or per policy. Utilize nutrition screening tool and intervene as necessary. Determine patient's food preferences and provide high-protein, high-caloric foods as appropriate.     INTERVENTIONS:  - Monitor oral intake, urinary output, labs, and treatment plans  - Assess nutrition and hydration status and recommend course of action  - Evaluate amount of meals eaten  - Assist patient with eating if necessary   - Allow adequate time for meals  - Recommend/ encourage appropriate diets, oral nutritional supplements, and vitamin/mineral supplements  - Order, calculate, and assess calorie counts as needed  - Recommend, monitor, and adjust tube feedings and TPN/PPN based on assessed needs  - Assess need for intravenous fluids  - Provide specific nutrition/hydration education as appropriate  - Include patient/family/caregiver in decisions related to nutrition  Outcome: Progressing     Problem: GASTROINTESTINAL - ADULT  Goal: Minimal or absence of nausea and/or vomiting  Description: INTERVENTIONS:  - Administer IV fluids if ordered to ensure adequate hydration  - Maintain NPO status until nausea and vomiting are resolved  - Nasogastric tube if ordered  - Administer ordered antiemetic medications as needed  - Provide nonpharmacologic comfort measures as appropriate  - Advance diet as tolerated, if ordered  - Consider nutrition services referral to assist patient with adequate nutrition and appropriate food choices  Outcome: Progressing  Goal: Maintains or returns to baseline bowel function  Description: INTERVENTIONS:  - Assess bowel function  - Encourage oral fluids to ensure adequate hydration  - Administer IV fluids if ordered to ensure adequate hydration  - Administer ordered medications as needed  - Encourage mobilization and activity  - Consider nutritional services referral to assist patient with adequate nutrition and appropriate food choices  Outcome: Progressing  Goal: Maintains adequate  nutritional intake  Description: INTERVENTIONS:  - Monitor percentage of each meal consumed  - Identify factors contributing to decreased intake, treat as appropriate  - Assist with meals as needed  - Monitor I&O, weight, and lab values if indicated  - Obtain nutrition services referral as needed  Outcome: Progressing     Problem: METABOLIC, FLUID AND ELECTROLYTES - ADULT  Goal: Electrolytes maintained within normal limits  Description: INTERVENTIONS:  - Monitor labs and assess patient for signs and symptoms of electrolyte imbalances  - Administer electrolyte replacement as ordered  - Monitor response to electrolyte replacements, including repeat lab results as appropriate  - Instruct patient on fluid and nutrition as appropriate  Outcome: Progressing  Goal: Fluid balance maintained  Description: INTERVENTIONS:  - Monitor labs   - Monitor I/O and WT  - Instruct patient on fluid and nutrition as appropriate  - Assess for signs & symptoms of volume excess or deficit  Outcome: Progressing     Problem: Prexisting or High Potential for Compromised Skin Integrity  Goal: Skin integrity is maintained or improved  Description: INTERVENTIONS:  - Identify patients at risk for skin breakdown  - Assess and monitor skin integrity  - Assess and monitor nutrition and hydration status  - Monitor labs   - Assess for incontinence   - Turn and reposition patient  - Assist with mobility/ambulation  - Relieve pressure over bony prominences  - Avoid friction and shearing  - Provide appropriate hygiene as needed including keeping skin clean and dry  - Evaluate need for skin moisturizer/barrier cream  - Collaborate with interdisciplinary team   - Patient/family teaching  - Consider wound care consult   Outcome: Progressing

## 2024-02-11 NOTE — PROGRESS NOTES
[facility]  Progress Note  Name: Celine Roa I  MRN: [id number]  Unit/Bed#: 402-01 I Date of Admission: 2/7/2024   Date of Service: 2/11/2024 I Hospital Day: 2    Assessment/Plan   Crohn's colitis (HCC)  Assessment & Plan  History of Crohn's  No evidence of acute flare  Continue PTA medication  Continue GI follow-up    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HCC)  Assessment & Plan  Continue PTA medication  Continue outpatient hematology, immunology follow-up    Bladder spasm  Assessment & Plan  Reports decreased urine output, having to push to urinate, and bladder spasms  No significant amounts of urine on bladder scan  No hydroureteronephrosis on renal US  Discussed with urology, can trial ditropan but will have to continue with bladder scans    Systemic lupus erythematosus, unspecified SLE type, unspecified organ involvement status (HCC)  Assessment & Plan  Continue PTA medication    Acquired hypothyroidism  Assessment & Plan  Continue levothyroxine    Headache  Assessment & Plan  Ongoing headache at this time  Patient requesting fioricet - improved with this  Reports resolved 2/10    * Intractable right upper quadrant abdominal pain  Assessment & Plan  Presented to the emergency room with complaints of abdominal pain  Patient reports pain in right upper quadrant with radiation to right back  Associated with nausea and dry heaving, denies diarrhea.  Patient reports subjective fever on Monday  CT shows-  Intrahepatic and extrahepatic biliary ductal dilation, similar to prior MRI and greater than expected solely on the basis of a postcholecystectomy state. Repeat MRI may be performed to assess for underlying stricture or obstructing lesion. Alternatively, ERCP/EUS may be performed if clinically indicated.  Received Pain medication in the ER with little relief   MRI consistent with CT scan, no specific blockage seen  Discussed with GI, they will be discussing with the advanced team on Monday to see  if patient can get EUS/ERCP  Continue IV fluids  Pain medication PRN  Zofran PRN  GI input appreciated  Patient requires IV pepcid and IV solumedrol prior to procedures               VTE Pharmacologic Prophylaxis:   Low Risk (Score 0-2) - Encourage Ambulation.    Mobility:   Basic Mobility Inpatient Raw Score: 23  JH-HLM Goal: 7: Walk 25 feet or more  JH-HLM Achieved: 7: Walk 25 feet or more  HLM Goal achieved. Continue to encourage appropriate mobility.    Patient Centered Rounds: I performed bedside rounds with nursing staff today.   Discussions with Specialists or Other Care Team Provider: none    Education and Discussions with Family / Patient: Patient declined call to .     Total Time Spent on Date of Encounter in care of patient: 50 mins. This time was spent on one or more of the following: performing physical exam; counseling and coordination of care; obtaining or reviewing history; documenting in the medical record; reviewing/ordering tests, medications or procedures; communicating with other healthcare professionals and discussing with patient's family/caregivers.    Current Length of Stay: 2 day(s)  Current Patient Status: Inpatient   Certification Statement: The patient will continue to require additional inpatient hospital stay due to iv meds, possible procedure  Discharge Plan: Anticipate discharge in 24-48 hrs to home.    Code Status: Level 1 - Full Code    Subjective:   Patient reports having diarrhea overnight that is now resolved. Still having difficulty with urinating. Still having RUQ abdominal pain    Objective:     Vitals:   Temp (24hrs), Av.1 °F (36.7 °C), Min:97.9 °F (36.6 °C), Max:98.5 °F (36.9 °C)    Temp:  [97.9 °F (36.6 °C)-98.5 °F (36.9 °C)] 98.5 °F (36.9 °C)  HR:  [84-93] 84  Resp:  [16] 16  BP: ()/(70-75) 96/70  SpO2:  [98 %-99 %] 98 %  Body mass index is 26.62 kg/m².     Input and Output Summary (last 24 hours):     Intake/Output Summary (Last 24 hours) at  2/11/2024 1541  Last data filed at 2/11/2024 1300  Gross per 24 hour   Intake 1100 ml   Output 1900 ml   Net -800 ml       Physical Exam:   Physical Exam  Vitals and nursing note reviewed.   Constitutional:       General: She is not in acute distress.     Appearance: She is obese. She is ill-appearing.   Cardiovascular:      Rate and Rhythm: Normal rate and regular rhythm.      Pulses: Normal pulses.      Heart sounds: Normal heart sounds.   Pulmonary:      Effort: Pulmonary effort is normal.      Breath sounds: Normal breath sounds. No wheezing or rales.   Abdominal:      General: Bowel sounds are normal.      Palpations: Abdomen is soft.      Tenderness: There is abdominal tenderness.   Musculoskeletal:      Right lower leg: No edema.      Left lower leg: No edema.   Skin:     General: Skin is warm and dry.   Neurological:      Mental Status: She is alert. Mental status is at baseline.   Psychiatric:         Mood and Affect: Mood normal.         Behavior: Behavior normal.          Additional Data:     Labs:  Results from last 7 days   Lab Units 02/11/24  0545   WBC Thousand/uL 7.21   HEMOGLOBIN g/dL 14.1   HEMATOCRIT % 43.7   PLATELETS Thousands/uL 380   NEUTROS PCT % 57   LYMPHS PCT % 27   MONOS PCT % 11   EOS PCT % 4     Results from last 7 days   Lab Units 02/11/24  0545   SODIUM mmol/L 138   POTASSIUM mmol/L 3.4*   CHLORIDE mmol/L 107   CO2 mmol/L 23   BUN mg/dL 2*   CREATININE mg/dL 0.63   ANION GAP mmol/L 8   CALCIUM mg/dL 7.6*   ALBUMIN g/dL 3.6   TOTAL BILIRUBIN mg/dL 0.44   ALK PHOS U/L 46   ALT U/L 20   AST U/L 17   GLUCOSE RANDOM mg/dL 96     Results from last 7 days   Lab Units 02/07/24  1746   INR  1.00             Results from last 7 days   Lab Units 02/07/24  1721   LACTIC ACID mmol/L 1.3       Lines/Drains:  Invasive Devices       Peripheral Intravenous Line  Duration             Peripheral IV 02/07/24 Upper;Ventral (anterior);Left Arm 3 days              Line  Duration             Pump Device  --  days                          Imaging: No pertinent imaging reviewed.    Recent Cultures (last 7 days):         Last 24 Hours Medication List:   Current Facility-Administered Medications   Medication Dose Route Frequency Provider Last Rate    vitamin C  1,000 mg Oral BID Gilberto Martinez PA-C      butalbital-acetaminophen-caffeine  1 tablet Oral Q4H PRN Teresa Nuñez PA-C      cholecalciferol  1,000 Units Oral BID Gilberto Martinez, RANDEE      cyanocobalamin  100 mcg Oral Daily Gilberto Martinez, RANDEE      diphenhydrAMINE  50 mg Intravenous Q4H PRN Teresa Nuñez, PA-DAYNA      famotidine  20 mg Intravenous Q12H KRAISSA Teresa Nuñez PA-C      folic acid  1 mg Oral Daily Gilberto Martinez, RANDEE      HYDROmorphone  0.5 mg Intravenous Q4H PRN Teresa Nuñez, PA-DAYNA      HYDROmorphone  0.5 mg Intravenous Q4H PRN Teresa Nuñez, RANDEE      hydrOXYzine HCL  15 mg Oral Daily Gilberto Martinez, RANDEE      levothyroxine  75 mcg Oral Every Other Day Gilberto Martinez, RANDEE      liothyronine  5 mcg Oral Daily Gilberto Martinez, RANDEE      loratadine  10 mg Oral Daily Gilberto Martinez, PA-DAYNA      Magnesium Gluconate  500 mg Oral BID Gilberto Martinez, RANDEE      methylPREDNISolone sodium succinate  40 mg Intravenous Daily Teresa Nuñez, RANDEE      mirtazapine  15 mg Oral HS Gilberto Martinez, PA-DAYNA      montelukast  10 mg Oral HS Gilberto Martinez, PA-DAYNA      niacin  100 mg Oral BID With Meals Gilberto Martinez PA-C      ondansetron  4 mg Intravenous Q6H PRN Gilberto Martinez, RANDEE      oxybutynin  5 mg Oral BID PRN Teresa Nuñez, RANDEE      sodium chloride  75 mL/hr Intravenous Continuous Gilberto Martinez, RANDEE 75 mL/hr (02/11/24 0528)        Today, Patient Was Seen By: Teresa Nuñez PA-C    **Please Note: This note may have been constructed using a voice recognition system.**

## 2024-02-11 NOTE — PLAN OF CARE
Problem: PAIN - ADULT  Goal: Verbalizes/displays adequate comfort level or baseline comfort level  Description: Interventions:  - Encourage patient to monitor pain and request assistance  - Assess pain using appropriate pain scale  - Administer analgesics based on type and severity of pain and evaluate response  - Implement non-pharmacological measures as appropriate and evaluate response  - Consider cultural and social influences on pain and pain management  - Notify physician/advanced practitioner if interventions unsuccessful or patient reports new pain  Outcome: Progressing     Problem: INFECTION - ADULT  Goal: Absence or prevention of progression during hospitalization  Description: INTERVENTIONS:  - Assess and monitor for signs and symptoms of infection  - Monitor lab/diagnostic results  - Monitor all insertion sites, i.e. indwelling lines  - Monitor endotracheal if appropriate and nasal secretions for changes in amount and color  - [place] appropriate cooling/warming therapies per order  - Administer medications as ordered  - Instruct and encourage patient and family to use good hand hygiene technique  - Identify and instruct in appropriate isolation precautions for identified infection/condition  Outcome: Progressing     Problem: SAFETY ADULT  Goal: Patient will remain free of falls  Description: INTERVENTIONS:  - Educate patient/family on patient safety including physical limitations  - Instruct patient to call for assistance with activity   - Consult OT/PT to assist with strengthening/mobility   - Keep Call bell within reach  - Keep bed low and locked with side rails adjusted as appropriate  - Keep care items and personal belongings within reach  - Initiate and maintain comfort rounds  - Make Fall Risk Sign visible to staff  - Offer Toileting every 4 Hours, in advance of need  - Initiate/Maintain bed alarm  - Obtain necessary fall risk management equipment  - Apply yellow socks and bracelet for high  fall risk patients  - Consider moving patient to room near nurses station  Outcome: Progressing  Goal: Maintain or return to baseline ADL function  Description: INTERVENTIONS:  -  Assess patient's ability to carry out ADLs; assess patient's baseline for ADL function and identify physical deficits which impact ability to perform ADLs (bathing, care of mouth/teeth, toileting, grooming, dressing, etc.)  - Assess/evaluate cause of self-care deficits   - Assess range of motion  - Assess patient's mobility; develop plan if impaired  - Assess patient's need for assistive devices and provide as appropriate  - Encourage maximum independence but intervene and supervise when necessary  - Involve family in performance of ADLs  - Assess for home care needs following discharge   - Consider OT consult to assist with ADL evaluation and planning for discharge  - Provide patient education as appropriate  Outcome: Progressing  Goal: Maintains/Returns to pre admission functional level  Description: INTERVENTIONS:  - Perform AM-PAC 6 Click Basic Mobility/ Daily Activity assessment daily.  - Set and communicate daily mobility goal to care team and patient/family/caregiver.   - Collaborate with rehabilitation services on mobility goals if consulted  - Perform Range of Motion 3 times a day.  - Reposition patient every 3 hours.  - Dangle patient 3 times a day  - Stand patient 3 times a day  - Ambulate patient 3 times a day  - Out of bed to chair 3 times a day   - Out of bed for meals 3 times a day  - Out of bed for toileting  - Record patient progress and toleration of activity level   Outcome: Progressing     Problem: Nutrition/Hydration-ADULT  Goal: Nutrient/Hydration intake appropriate for improving, restoring or maintaining nutritional needs  Description: Monitor and assess patient's nutrition/hydration status for malnutrition. Collaborate with interdisciplinary team and initiate plan and interventions as ordered.  Monitor patient's  weight and dietary intake as ordered or per policy. Utilize nutrition screening tool and intervene as necessary. Determine patient's food preferences and provide high-protein, high-caloric foods as appropriate.     INTERVENTIONS:  - Monitor oral intake, urinary output, labs, and treatment plans  - Assess nutrition and hydration status and recommend course of action  - Evaluate amount of meals eaten  - Assist patient with eating if necessary   - Allow adequate time for meals  - Recommend/ encourage appropriate diets, oral nutritional supplements, and vitamin/mineral supplements  - Order, calculate, and assess calorie counts as needed  - Recommend, monitor, and adjust tube feedings and TPN/PPN based on assessed needs  - Assess need for intravenous fluids  - Provide specific nutrition/hydration education as appropriate  - Include patient/family/caregiver in decisions related to nutrition  Outcome: Progressing     Problem: GASTROINTESTINAL - ADULT  Goal: Minimal or absence of nausea and/or vomiting  Description: INTERVENTIONS:  - Administer IV fluids if ordered to ensure adequate hydration  - Maintain NPO status until nausea and vomiting are resolved  - Nasogastric tube if ordered  - Administer ordered antiemetic medications as needed  - Provide nonpharmacologic comfort measures as appropriate  - Advance diet as tolerated, if ordered  - Consider nutrition services referral to assist patient with adequate nutrition and appropriate food choices  Outcome: Progressing  Goal: Maintains or returns to baseline bowel function  Description: INTERVENTIONS:  - Assess bowel function  - Encourage oral fluids to ensure adequate hydration  - Administer IV fluids if ordered to ensure adequate hydration  - Administer ordered medications as needed  - Encourage mobilization and activity  - Consider nutritional services referral to assist patient with adequate nutrition and appropriate food choices  Outcome: Progressing  Goal: Maintains  adequate nutritional intake  Description: INTERVENTIONS:  - Monitor percentage of each meal consumed  - Identify factors contributing to decreased intake, treat as appropriate  - Assist with meals as needed  - Monitor I&O, weight, and lab values if indicated  - Obtain nutrition services referral as needed  Outcome: Progressing     Problem: METABOLIC, FLUID AND ELECTROLYTES - ADULT  Goal: Electrolytes maintained within normal limits  Description: INTERVENTIONS:  - Monitor labs and assess patient for signs and symptoms of electrolyte imbalances  - Administer electrolyte replacement as ordered  - Monitor response to electrolyte replacements, including repeat lab results as appropriate  - Instruct patient on fluid and nutrition as appropriate  Outcome: Progressing  Goal: Fluid balance maintained  Description: INTERVENTIONS:  - Monitor labs   - Monitor I/O and WT  - Instruct patient on fluid and nutrition as appropriate  - Assess for signs & symptoms of volume excess or deficit  Outcome: Progressing     Problem: Prexisting or High Potential for Compromised Skin Integrity  Goal: Skin integrity is maintained or improved  Description: INTERVENTIONS:  - Identify patients at risk for skin breakdown  - Assess and monitor skin integrity  - Assess and monitor nutrition and hydration status  - Monitor labs   - Assess for incontinence   - Turn and reposition patient  - Assist with mobility/ambulation  - Relieve pressure over bony prominences  - Avoid friction and shearing  - Provide appropriate hygiene as needed including keeping skin clean and dry  - Evaluate need for skin moisturizer/barrier cream  - Collaborate with interdisciplinary team   - Patient/family teaching  - Consider wound care consult   Outcome: Progressing

## 2024-02-11 NOTE — ASSESSMENT & PLAN NOTE
Presented to the emergency room with complaints of abdominal pain  Patient reports pain in right upper quadrant with radiation to right back  Associated with nausea and dry heaving, denies diarrhea.  Patient reports subjective fever on Monday  CT shows-  Intrahepatic and extrahepatic biliary ductal dilation, similar to prior MRI and greater than expected solely on the basis of a postcholecystectomy state. Repeat MRI may be performed to assess for underlying stricture or obstructing lesion. Alternatively, ERCP/EUS may be performed if clinically indicated.  Received Pain medication in the ER with little relief   MRI consistent with CT scan, no specific blockage seen  Discussed with GI, they will be discussing with the advanced team on Monday to see if patient can get EUS/ERCP  Continue IV fluids  Pain medication PRN  Zofran PRN  GI input appreciated  Patient requires IV pepcid and IV solumedrol prior to procedures

## 2024-02-12 ENCOUNTER — TELEPHONE (OUTPATIENT)
Dept: OTHER | Facility: HOSPITAL | Age: 49
End: 2024-02-12

## 2024-02-12 ENCOUNTER — HOSPITAL ENCOUNTER (OUTPATIENT)
Dept: INFUSION CENTER | Facility: HOSPITAL | Age: 49
Discharge: HOME | End: 2024-02-12
Attending: INTERNAL MEDICINE

## 2024-02-12 PROBLEM — R51.9 HEADACHE: Status: RESOLVED | Noted: 2017-08-25 | Resolved: 2024-02-12

## 2024-02-12 LAB
ATRIAL RATE: 90 BPM
P AXIS: 67 DEGREES
PR INTERVAL: 126 MS
QRS AXIS: 71 DEGREES
QRSD INTERVAL: 78 MS
QT INTERVAL: 360 MS
QTC INTERVAL: 440 MS
T WAVE AXIS: 66 DEGREES
VENTRICULAR RATE: 90 BPM

## 2024-02-12 PROCEDURE — 99232 SBSQ HOSP IP/OBS MODERATE 35: CPT | Performed by: PHYSICIAN ASSISTANT

## 2024-02-12 PROCEDURE — 93010 ELECTROCARDIOGRAM REPORT: CPT | Performed by: INTERNAL MEDICINE

## 2024-02-12 PROCEDURE — 99222 1ST HOSP IP/OBS MODERATE 55: CPT | Performed by: PHYSICIAN ASSISTANT

## 2024-02-12 RX ADMIN — METHYLPREDNISOLONE SODIUM SUCCINATE 40 MG: 40 INJECTION, POWDER, FOR SOLUTION INTRAMUSCULAR; INTRAVENOUS at 08:53

## 2024-02-12 RX ADMIN — FAMOTIDINE 20 MG: 10 INJECTION, SOLUTION INTRAVENOUS at 20:26

## 2024-02-12 RX ADMIN — ONDANSETRON 4 MG: 2 INJECTION INTRAMUSCULAR; INTRAVENOUS at 04:19

## 2024-02-12 RX ADMIN — HYDROMORPHONE HYDROCHLORIDE 0.5 MG: 1 INJECTION, SOLUTION INTRAMUSCULAR; INTRAVENOUS; SUBCUTANEOUS at 20:26

## 2024-02-12 RX ADMIN — HYDROMORPHONE HYDROCHLORIDE 0.5 MG: 1 INJECTION, SOLUTION INTRAMUSCULAR; INTRAVENOUS; SUBCUTANEOUS at 10:50

## 2024-02-12 RX ADMIN — SODIUM CHLORIDE 75 ML/HR: 0.9 INJECTION, SOLUTION INTRAVENOUS at 20:27

## 2024-02-12 RX ADMIN — DIPHENHYDRAMINE HYDROCHLORIDE 50 MG: 50 INJECTION, SOLUTION INTRAMUSCULAR; INTRAVENOUS at 10:50

## 2024-02-12 RX ADMIN — HYDROMORPHONE HYDROCHLORIDE 0.5 MG: 1 INJECTION, SOLUTION INTRAMUSCULAR; INTRAVENOUS; SUBCUTANEOUS at 02:37

## 2024-02-12 RX ADMIN — DIPHENHYDRAMINE HYDROCHLORIDE 50 MG: 50 INJECTION, SOLUTION INTRAMUSCULAR; INTRAVENOUS at 06:51

## 2024-02-12 RX ADMIN — MONTELUKAST SODIUM 10 MG: 10 TABLET ORAL at 21:19

## 2024-02-12 RX ADMIN — HYDROMORPHONE HYDROCHLORIDE 0.5 MG: 1 INJECTION, SOLUTION INTRAMUSCULAR; INTRAVENOUS; SUBCUTANEOUS at 04:18

## 2024-02-12 RX ADMIN — BUTALBITAL, ACETAMINOPHEN, AND CAFFEINE 1 TABLET: 50; 325; 40 TABLET, COATED ORAL at 04:23

## 2024-02-12 RX ADMIN — HYDROMORPHONE HYDROCHLORIDE 0.5 MG: 1 INJECTION, SOLUTION INTRAMUSCULAR; INTRAVENOUS; SUBCUTANEOUS at 12:31

## 2024-02-12 RX ADMIN — HYDROMORPHONE HYDROCHLORIDE 0.5 MG: 1 INJECTION, SOLUTION INTRAMUSCULAR; INTRAVENOUS; SUBCUTANEOUS at 14:58

## 2024-02-12 RX ADMIN — DIPHENHYDRAMINE HYDROCHLORIDE 50 MG: 50 INJECTION, SOLUTION INTRAMUSCULAR; INTRAVENOUS at 14:58

## 2024-02-12 RX ADMIN — HYDROMORPHONE HYDROCHLORIDE 0.5 MG: 1 INJECTION, SOLUTION INTRAMUSCULAR; INTRAVENOUS; SUBCUTANEOUS at 06:51

## 2024-02-12 RX ADMIN — DIPHENHYDRAMINE HYDROCHLORIDE 50 MG: 50 INJECTION, SOLUTION INTRAMUSCULAR; INTRAVENOUS at 20:26

## 2024-02-12 RX ADMIN — SODIUM CHLORIDE 75 ML/HR: 0.9 INJECTION, SOLUTION INTRAVENOUS at 07:20

## 2024-02-12 RX ADMIN — FAMOTIDINE 20 MG: 10 INJECTION, SOLUTION INTRAVENOUS at 08:53

## 2024-02-12 RX ADMIN — DIPHENHYDRAMINE HYDROCHLORIDE 50 MG: 50 INJECTION, SOLUTION INTRAMUSCULAR; INTRAVENOUS at 02:37

## 2024-02-12 RX ADMIN — ONDANSETRON 4 MG: 2 INJECTION INTRAMUSCULAR; INTRAVENOUS at 12:32

## 2024-02-12 RX ADMIN — MIRTAZAPINE 15 MG: 15 TABLET, FILM COATED ORAL at 21:19

## 2024-02-12 NOTE — PROGRESS NOTES
[facility]  Progress Note  Name: Celine Roa I  MRN: [id number]  Unit/Bed#: 402-01 I Date of Admission: 2/7/2024   Date of Service: 2/12/2024 I Hospital Day: 3    Assessment/Plan   Crohn's colitis (HCC)  Assessment & Plan  History of Crohn's  No evidence of acute flare  Continue PTA medication  Continue GI follow-up    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HCC)  Assessment & Plan  Continue PTA medication  Continue outpatient hematology, immunology follow-up    Bladder spasm  Assessment & Plan  Reports decreased urine output, having to push to urinate, and bladder spasms  No significant amounts of urine on bladder scan  No hydroureteronephrosis on renal US  Discussed with urology, can trial ditropan but will have to continue with bladder scans  Appreciate urology input    Systemic lupus erythematosus, unspecified SLE type, unspecified organ involvement status (HCC)  Assessment & Plan  Continue PTA medication    Acquired hypothyroidism  Assessment & Plan  Continue levothyroxine    * Intractable right upper quadrant abdominal pain  Assessment & Plan  Presented to the emergency room with complaints of abdominal pain  Patient reports pain in right upper quadrant with radiation to right back  Associated with nausea and dry heaving, denies diarrhea.  Patient reports subjective fever on Monday  CT shows-  Intrahepatic and extrahepatic biliary ductal dilation, similar to prior MRI and greater than expected solely on the basis of a postcholecystectomy state. Repeat MRI may be performed to assess for underlying stricture or obstructing lesion. Alternatively, ERCP/EUS may be performed if clinically indicated.  Received Pain medication in the ER with little relief   MRI consistent with CT scan, no specific blockage seen  Discussed with GI, they will be discussing with the advanced team on Monday to see if patient can get EUS/ERCP  At this time, no indication for ERCP based off of patient's lab work and  imaging  Dr. Soto recommended that patient be seen in person by GI  and their team can discuss with [facility] tomorrow to reconsider ERCP  Continue IV fluids  Pain medication PRN  Zofran PRN  GI input appreciated  Patient requires IV pepcid and IV solumedrol prior to procedures, initiated on   Did discuss trial of lyrica or gabapentin, patient has tried these before without relief  Could consider if this abdominal pain is related to an atypical migraine if no other pathophysiology found               VTE Pharmacologic Prophylaxis:   Moderate Risk (Score 3-4) - Pharmacological DVT Prophylaxis Contraindicated. Sequential Compression Devices Ordered.    Mobility:   Basic Mobility Inpatient Raw Score: 23  JH-HLM Goal: 7: Walk 25 feet or more  JH-HLM Achieved: 7: Walk 25 feet or more  HLM Goal achieved. Continue to encourage appropriate mobility.    Patient Centered Rounds: I performed bedside rounds with nursing staff today.   Discussions with Specialists or Other Care Team Provider: case management, GYN, GI    Education and Discussions with Family / Patient: Patient declined call to .     Total Time Spent on Date of Encounter in care of patient: 55 mins. This time was spent on one or more of the following: performing physical exam; counseling and coordination of care; obtaining or reviewing history; documenting in the medical record; reviewing/ordering tests, medications or procedures; communicating with other healthcare professionals and discussing with patient's family/caregivers.    Current Length of Stay: 3 day(s)  Current Patient Status: Inpatient   Certification Statement: The patient will continue to require additional inpatient hospital stay due to intractable pain  Discharge Plan: Anticipate discharge in 24-48 hrs to home.    Code Status: Level 1 - Full Code    Subjective:   Patient reports continued RUQ abdominal pain    Objective:     Vitals:   Temp (24hrs), Av.9 °F (36.6 °C), Min:97.3  °F (36.3 °C), Max:98.3 °F (36.8 °C)    Temp:  [97.3 °F (36.3 °C)-98.3 °F (36.8 °C)] 98.3 °F (36.8 °C)  HR:  [72-89] 86  Resp:  [18] 18  BP: ()/(61-71) 111/71  SpO2:  [94 %-97 %] 94 %  Body mass index is 26.77 kg/m².     Input and Output Summary (last 24 hours):     Intake/Output Summary (Last 24 hours) at 2/12/2024 1519  Last data filed at 2/12/2024 1300  Gross per 24 hour   Intake 360 ml   Output 800 ml   Net -440 ml       Physical Exam:   Physical Exam  Vitals and nursing note reviewed.   Constitutional:       General: She is not in acute distress.     Appearance: She is obese. She is ill-appearing.   Cardiovascular:      Rate and Rhythm: Normal rate and regular rhythm.      Pulses: Normal pulses.      Heart sounds: Normal heart sounds.   Pulmonary:      Effort: Pulmonary effort is normal.   Abdominal:      General: Bowel sounds are normal.      Tenderness: There is abdominal tenderness.   Musculoskeletal:      Right lower leg: No edema.      Left lower leg: No edema.   Neurological:      Mental Status: She is alert. Mental status is at baseline.   Psychiatric:         Mood and Affect: Mood normal.         Behavior: Behavior normal.          Additional Data:     Labs:  Results from last 7 days   Lab Units 02/11/24  0545   WBC Thousand/uL 7.21   HEMOGLOBIN g/dL 14.1   HEMATOCRIT % 43.7   PLATELETS Thousands/uL 380   NEUTROS PCT % 57   LYMPHS PCT % 27   MONOS PCT % 11   EOS PCT % 4     Results from last 7 days   Lab Units 02/11/24  0545   SODIUM mmol/L 138   POTASSIUM mmol/L 3.4*   CHLORIDE mmol/L 107   CO2 mmol/L 23   BUN mg/dL 2*   CREATININE mg/dL 0.63   ANION GAP mmol/L 8   CALCIUM mg/dL 7.6*   ALBUMIN g/dL 3.6   TOTAL BILIRUBIN mg/dL 0.44   ALK PHOS U/L 46   ALT U/L 20   AST U/L 17   GLUCOSE RANDOM mg/dL 96     Results from last 7 days   Lab Units 02/07/24  1746   INR  1.00             Results from last 7 days   Lab Units 02/07/24  1721   LACTIC ACID mmol/L 1.3       Lines/Drains:  Invasive Devices        Peripheral Intravenous Line  Duration             Peripheral IV 02/07/24 Upper;Ventral (anterior);Left Arm 4 days              Line  Duration             Pump Device  -- days                          Imaging: No pertinent imaging reviewed.    Recent Cultures (last 7 days):         Last 24 Hours Medication List:   Current Facility-Administered Medications   Medication Dose Route Frequency Provider Last Rate    vitamin C  1,000 mg Oral BID iGlberto Martinez PA-C      butalbital-acetaminophen-caffeine  1 tablet Oral Q4H PRN Teresa Nuñez PA-C      cholecalciferol  1,000 Units Oral BID Gilberto Martinez PA-C      cyanocobalamin  100 mcg Oral Daily Gilberto Martinez PA-C      diphenhydrAMINE  50 mg Intravenous Q4H PRN Teresa Nuñez PA-C      famotidine  20 mg Intravenous Q12H KARISSA Teresa Nuñez PA-C      folic acid  1 mg Oral Daily Gilberto Martinez PA-C      HYDROmorphone  0.5 mg Intravenous Q4H PRN Teresa Nuñez PA-C      HYDROmorphone  0.5 mg Intravenous Q4H PRN Teresa Nuñez PA-C      hydrOXYzine HCL  15 mg Oral Daily Gilberto Martinez PA-C      levothyroxine  75 mcg Oral Every Other Day Gilberto Martinez PA-C      liothyronine  5 mcg Oral Daily Gilberto Martinez PA-C      loratadine  10 mg Oral Daily Gilberto Martinez PA-C      Magnesium Gluconate  500 mg Oral BID Gilberto Martinez, RANDEE      methylPREDNISolone sodium succinate  40 mg Intravenous Daily Teresa Nuñez PA-C      mirtazapine  15 mg Oral HS Gilberto Martinez PA-C      montelukast  10 mg Oral HS Gilberto Martinez PA-C      niacin  100 mg Oral BID With Meals Gilberto Martinez PA-C      ondansetron  4 mg Intravenous Q6H PRN Gilberto Martinez PA-C      oxybutynin  5 mg Oral BID PRN Teresa Nuñez PA-C      sodium chloride  75 mL/hr Intravenous Continuous Gilberto Martinez PA-C 75 mL/hr (02/12/24 0720)        Today, Patient Was Seen By: Teresa Nuñez PA-C    **Please Note: This note may have been constructed using a voice recognition system.**

## 2024-02-12 NOTE — TELEPHONE ENCOUNTER
known to dr riojas and dr steele had negative turbt last year  seen in [facility] today  arrange outpatient visit to discuss bladder symptoms and she will also be seeing gyn for endometriosis questions

## 2024-02-12 NOTE — ASSESSMENT & PLAN NOTE
Presented to the emergency room with complaints of abdominal pain  Patient reports pain in right upper quadrant with radiation to right back  Associated with nausea and dry heaving, denies diarrhea.  Patient reports subjective fever on Monday  CT shows-  Intrahepatic and extrahepatic biliary ductal dilation, similar to prior MRI and greater than expected solely on the basis of a postcholecystectomy state. Repeat MRI may be performed to assess for underlying stricture or obstructing lesion. Alternatively, ERCP/EUS may be performed if clinically indicated.  Received Pain medication in the ER with little relief   MRI consistent with CT scan, no specific blockage seen  Discussed with GI, they will be discussing with the advanced team on Monday to see if patient can get EUS/ERCP  At this time, no indication for ERCP based off of patient's lab work and imaging  Dr. Soto recommended that patient be seen in person by GI 2/13 and their team can discuss with [facility] tomorrow to reconsider ERCP  Continue IV fluids  Pain medication PRN  Zofran PRN  GI input appreciated  Discussed with GYN - they think it is highly unlikely patient's symptoms would be related to endometriosis  Patient requires IV pepcid and IV solumedrol prior to procedures, initiated on 2/11  Did discuss trial of lyrica or gabapentin, patient has tried these before without relief  Could consider if this abdominal pain is related to an atypical migraine if no other pathophysiology found

## 2024-02-12 NOTE — CONSULTS
Consultation - Urology  Celine Roa 48 y.o. female MRN: [id number]  Unit/Bed#: 402-01 Encounter: [id number]    ASSESSMENT  49 y/o F w/ PMH mast cell syndrome and lupus and past urological hx including bilateral nephrolithiasis, gross hematuria s/p TURBT, and pelvic organ prolapse with grade 3 cystocele and rectocele.   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 The pathology report from intraoperative sample obtained was reviewed and was negative for high-grade dysplasia or carcinoma. She has chronic lower urinary tract symptoms and occasional UTIs. Urology was consulted over the weekend for sx of stranguria. Kellie CASTILLO reviewed, please see note.     Vitals, labs, imaging, and chart hx reviewed with the patient. No signs of infection, obstruction or any other  related pathology.     The patient's chief complaint is more lower abdominal pain which she thinks could be related to endometriosis (she is s/p hysterectomy but still has ovaries).     PLAN:  No acute surgical intervention required this admission.  Pt will need OP FU with urology to discuss TURBT results and for a workup if symptoms persist  Continue frequent bladder scans, recording I/Os, UR protocol as needed  Continue low dose Ditropan for bladder spasms  Recommend gynecology consultation for input on endometriosis  Continue workup of biliary etiology per GI/SLIM    Contact urology as needed.    SUBJECTIVE:  Chief Complaint: lower abdominal pain    HPI: Celine Roa is a 48 y.o. year old female with PMH mast cell syndrome and lupus and past urological hx including bilateral nephrolithiasis, gross hematuria s/p TURBT, and pelvic organ prolapse with grade 3 cystocele and rectocele who presents with abdominal pain. States this has been ongoing since her hysterectomy but has become more persistent and severe over the last week. Describes the pain as bilateral lower quadrant  pain. She also notes that she has not urinated much if at all the last few days and has been straining to urinate. Denies hematuria. Denies fever/chills, flank pain, back pain. Low appetite since her pain began. No n/v. She had a hx of endometriosis, she is s/p hysterectomy but wonders if her pain is related to residual lesions on other organs as she notes that she had multiple lesions everywhere noted on a dx lap in the past, including admission for endometriosis of the lung.      Review of Systems:  Review of Systems   Constitutional:  Negative for appetite change, chills and fever.   HENT: Negative.     Eyes: Negative.    Respiratory: Negative.     Cardiovascular: Negative.    Gastrointestinal:  Positive for abdominal pain and diarrhea (notes lose stools). Negative for abdominal distention, constipation, nausea and vomiting.   Genitourinary: Negative.    Musculoskeletal: Negative.    Skin: Negative.    Neurological: Negative.    Hematological: Negative.    Psychiatric/Behavioral: Negative.         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 pt denies, documented in cardio note    Pancreatitis     sphinger of     Psychiatric disorder     RA (rheumatoid arthritis) (HCC)     Seizures (HCC)     Ulcerative colitis (HCC)     Visual impairment      Past Surgical History:   Procedure Laterality  Date    ABDOMINAL SURGERY  2017    APPENDECTOMY      CHOLECYSTECTOMY      COLONOSCOPY  2019    CYSTOSCOPY N/A 6/8/2023    Procedure: CYSTOSCOPY, mini TURBT with fulgeration;  Surgeon: Tee Bruno MD;  Location: [facility];  Service: Urology    EGD AND COLONOSCOPY N/A 09/12/2017    Procedure: EGD AND COLONOSCOPY;  Surgeon: Tommy Oliver MD;  Location:  GI LAB;  Service: Gastroenterology    ERCP N/A 09/15/2017    Procedure: ENDOSCOPIC RETROGRADE CHOLANGIOPANCREATOGRAPHY (ERCP);  Surgeon: Dre Soto MD;  Location: [facility];  Service: Gastroenterology    HYSTERECTOMY      KNEE SURGERY Right     LAPAROSCOPIC ENDOMETRIOSIS FULGURATION      ORIF ANKLE FRACTURE Left     NY ARTHRS KNE SURG W/MENISCECTOMY MED/LAT W/SHVG Left 05/12/2017    Procedure: POSSIBLE MEDIAL MENISECTOMY;  Surgeon: Kristian Avila MD;  Location: [facility];  Service: Orthopedics    NY ARTHRS KNEE DEBRIDEMENT/SHAVING ARTCLR CRTLG Left 05/12/2017    Procedure: KNEE ARTHROSCOPY EVALUATION, CHONDROPLASTY;  Surgeon: Kristian Avila MD;  Location: [facility];  Service: Orthopedics    NY LAPS ABD PRTM&OMENTUM DX W/WO SPEC BR/WA SPX N/A 12/12/2017    Procedure: LAPAROSCOPY DIAGNOSTIC  WITH LYSIS OF ADHESIONS;  Surgeon: Olaf John DO;  Location:  MAIN OR;  Service: General    UPPER GASTROINTESTINAL ENDOSCOPY  2019     Social History   Social History     Substance and Sexual Activity   Alcohol Use Never     Social History     Substance and Sexual Activity   Drug Use Never     Social History     Tobacco Use   Smoking Status Never   Smokeless Tobacco Never     Family History: non-contributory    Meds/Allergies   Home meds:   Prior to Admission medications    Medication Sig Start Date End Date Taking? Authorizing Provider   Ascorbic Acid (vitamin C) 1000 MG tablet Take 1,000 mg by mouth 2 (two) times a day   Yes Historical Provider, MD   cetirizine (ZyrTEC) 10 mg tablet Take 20 mg by mouth daily   Yes Historical Provider, MD   cholecalciferol (VITAMIN D3) 1,000  "units tablet Take 1,000 Units by mouth 2 (two) times a day   Yes Historical Provider, MD   cyanocobalamin (VITAMIN B-12) 100 mcg tablet Take by mouth daily   Yes Historical Provider, MD   diphenhydrAMINE (BENADRYL) 50 mg capsule Take 50 mg by mouth 4 (four) times a day as needed for itching Pt states takes 50mg daily, but may go up to 4 times daily prn   Yes Historical Provider, MD   famotidine (PEPCID) 40 MG tablet TAKE ONE TABLET BY MOUTH ONCE DAILY 12/29/23  Yes Yehuda Levine MD   folic acid (KP Folic Acid) 1 mg tablet Take 1 tablet (1 mg total) by mouth daily 10/23/23  Yes Yehuda Levine MD   hydrOXYzine HCL (ATARAX) 25 mg tablet Take 15 mg by mouth daily 5/26/23  Yes Historical Provider, MD   levothyroxine 100 mcg tablet Take 75 mcg by mouth every other day   Yes Historical Provider, MD   liothyronine (CYTOMEL) 5 mcg tablet Take 1 tablet (5 mcg total) by mouth daily Do not start before June 14, 2023. 6/14/23  Yes Sera Pink PA-C   magnesium gluconate (MAGONATE) 500 mg tablet Take 500 mg by mouth 2 (two) times a day   Yes Historical Provider, MD   Melatonin 3 MG SUBL 6 mg daily at bedtime 8/15/23  Yes Historical Provider, MD   methotrexate 50 MG/2ML injection Inject 1 mL (25 mg total) under the skin once a week 11/1/23  Yes Yehuda Levine MD   mirtazapine (REMERON) 15 mg tablet Take 1 tablet (15 mg total) by mouth daily at bedtime 1/23/24  Yes Diogenes Lewis DO   montelukast (SINGULAIR) 10 mg tablet Take 1 tablet (10 mg total) by mouth daily at bedtime 8/15/23  Yes Letha Carrero MD   predniSONE 20 mg tablet Take 40mg per day for 1 week, then 30mg per day for 1 week, then 20mg per day for 1 week and then 10mg per day for 1 week. 1/17/24  Yes Yehuda Levine MD   Riboflavin (Vitamin B-2) 100 MG TABS Take 100 mg by mouth 2 (two) times a day   Yes Historical Provider, MD   BAnthonyD 3CC LUER-SHAMEKA SYR 25GX1\" 25G X 1\" 3 ML MISC  6/1/22   Historical Provider, MD " "  butalbital-acetaminophen-caffeine (FIORICET,ESGIC) -40 mg per tablet Take 1 tablet by mouth every 8 (eight) hours as needed for migraine  Patient not taking: Reported on 1/29/2024 6/23/23   Holden Gill DO   Cholecalciferol 10 MCG /0.025ML LIQD Take 1,000 Units by mouth 2 (two) times a day  Patient not taking: Reported on 1/29/2024 1/20/23   Holden Gill DO   diclofenac (VOLTAREN) 75 mg EC tablet  7/14/23   Historical Provider, MD   EpiPen 2-Malachi 0.3 MG/0.3ML SOAJ INJECT 1 PEN INTO THE MUSCLE AS NEEDED FOR ANAPHALAXIS  Patient not taking: Reported on 1/29/2024 5/30/23   Historical Provider, MD   Lactobacillus (PROBIOTIC ACIDOPHILUS PO) Take by mouth    Historical Provider, MD   Lactobacillus-Inulin ([facility]) CAPS Take 200 mg by mouth daily    Historical Provider, MD   levalbuterol (XOPENEX HFA) 45 mcg/act inhaler Inhale 1-2 puffs every 4 (four) hours as needed for shortness of breath 5/23/23   Holden Gill DO   melatonin 3 mg Take 1 tablet (3 mg total) by mouth daily at bedtime  Patient not taking: Reported on 1/29/2024 8/15/23   Letha Carrero MD   methotrexate 50 MG/2ML injection Inject 1 mL (25 mg total) under the skin once a week  Patient not taking: Reported on 1/29/2024 10/23/23   Yehuda Levine MD   NEEDLE, DISP, 27 G (B-D DISP NEEDLE 87HN5-7/4\") 27G X 1-1/4\" MISC Use once a week 10/23/23   Yehuda Levine MD   NON FORMULARY immunoglobin sq 6g 30 ml once a week    Historical Provider, MD   nystatin (MYCOSTATIN) 500,000 units/5 mL suspension Swish and spit 5 mL (500,000 Units total) 4 (four) times a day  Patient not taking: Reported on 9/13/2023 3/10/23   Teresa Nuñez PA-C   omalizumab (XOLAIR) 150 mg Inject 2.4 mL (300 mg total) under the skin every 21 days  Patient taking differently: Inject 300 mg under the skin every 28 days 11/27/23   Olaf Barrios MD   pantoprazole (PROTONIX) 40 mg tablet TAKE ONE TABLET BY MOUTH TWICE A DAY  Patient not taking: " Reported on 11/27/2023 12/30/22   Sera Rincon PA-C   Riboflavin (VITAMIN B-2 PO) Take by mouth    Historical Provider, MD   risankizumab-rzaa (Skyrizi) 360 MG/2.4ML SOCT Take first on body injector (OBI) under skin on week 12 then every 8 weeks there after 12/12/23   Yehuda Levine MD   scopolamine (TRANSDERM-SCOP) 1 mg/3 days TD 72 hr patch Place 1 patch on the skin over 72 hours every third day  Patient not taking: Reported on 2/7/2024 2/7/24   Yehuda Levine MD   TechLite Lancets MISC  4/15/22   Historical Provider, MD   traMADol (Ultram) 50 mg tablet Take 1 tablet (50 mg total) by mouth every 12 (twelve) hours as needed for moderate pain or severe pain  Patient not taking: Reported on 9/14/2023 6/23/23   Holden Gill, DO     Allergies:   Allergies   Allergen Reactions    Aimovig [Erenumab-Aooe] Anaphylaxis    Amitriptyline Anaphylaxis     According to the patient she had nphylaxis    Aspergillus Allergy Skin Test Other (See Comments), Hives and Swelling    Azathioprine Other (See Comments) and Anaphylaxis     pancreatitis  According to patient she needed Epipen    Banana - Food Allergy Anaphylaxis, Itching, Swelling and Tongue Swelling    Buspar [Buspirone] Hives and Shortness Of Breath    Citric Acid-Polysorbate 80 Abdominal Pain, Anaphylaxis, Anxiety, Bradycardia, Diarrhea, Fatigue, GI Intolerance, Headache, Hives, Hyperactivity, Itching, Lip Swelling, Nausea Only, Palpitations, Rash, Shortness Of Breath, Tachycardia, Throat Swelling, Tongue Swelling and Vomiting    Corn Dextrin Anaphylaxis     Any corn based products    Eggs Or Egg-Derived Products - Food Allergy Anaphylaxis    Epinephrine Anaphylaxis    Erenumab Anaphylaxis     patient sent portal message stating she had anaphylaxis  patient sent portal message stating she had anaphylaxis      Gadolinium Abdominal Pain, Anaphylaxis, Anxiety, Confusion, Delirium, Dizziness, Eye Swelling, Fatigue, GI Intolerance, Headache, Hives,  "Irritability, Itching, Lip Swelling, Nausea Only, Palpitations, Photosensitivity, Rash, Seizures, Shortness Of Breath, Swelling, Syncope, Throat Swelling, Tongue Swelling, Tremor, Visual Disturbance and Vomiting    Gelatin - Food Allergy Anaphylaxis    Heparin Hives     Painful welts     Lactated Ringers Shortness Of Breath     Pt questions this allergy, pt has received LR multiple times without reaction    Lavender Oil Anaphylaxis     Other reaction(s): anaphalxis    Malto Dextrin [Dextrin] Anaphylaxis    Other Anaphylaxis     Gel caps, maltodextrose, dextrose,grapes, lavender     Penicillium Notatum Shortness Of Breath and Swelling    Red Dye - Food Allergy Anaphylaxis and Other (See Comments)     intolerance    Sumatriptan Anaphylaxis     Bradycardia, sob, back pressure, head pain,throat tightness  According to the patient she had anphylaxis    Ustekinumab Anaphylaxis    Contrast [Iodinated Contrast Media] Other (See Comments)     Pt states needs to be premedicated prior to injection    Corn Oil - Food Allergy - Food Allergy Hives    Humira [Adalimumab] Other (See Comments)     \"I develop drug induced lupus\"    Ibuprofen Hives and Throat Swelling    Polyethylene Glycol Diarrhea and Vomiting    Remicade [Infliximab] Other (See Comments)     \"I develop drug induced lupus\"    Soy Allergy - Food Allergy Other (See Comments)    Sulfa Antibiotics Hives and Other (See Comments)    Sulfate Hives    Sulfites - Food Allergy Hives    Sweet Corn Extract Skin Test - Food Allergy Hives and Itching    Chlorhexidine Itching    Freederm Adhesive Remover [New Skin] Rash    Histamine Hives, Rash and Other (See Comments)     Intolerance      Wound Dressings Rash           OBJECTIVE:   Vitals: Blood pressure 100/61, pulse 72, temperature 98 °F (36.7 °C), resp. rate 18, height 5' 5.25\" (1.657 m), weight 73.5 kg (162 lb 0.6 oz), SpO2 97%, not currently breastfeeding. Body mass index is 26.77 kg/m².    I/Os:  I/O         02/10 " 0701 02/11 0700 02/11 0701  02/12 0700 02/12 0701  02/13 0700    P.O. 540 480     I.V. (mL/kg) 980 (13.4)      Total Intake(mL/kg) 1520 (20.8) 480 (6.5)     Urine (mL/kg/hr) 2400 (1.4) 800 (0.5)     Stool 0 0     Total Output 2400 800     Net -880 -320            Unmeasured Stool Occurrence 1 x 1 x             Lines/Tubes:  Invasive Devices       Peripheral Intravenous Line  Duration             Peripheral IV 02/07/24 Upper;Ventral (anterior);Left Arm 4 days              Line  Duration             Pump Device  -- days                    Current Inpatient Medications:  Scheduled Meds:  Current Facility-Administered Medications   Medication Dose Route Frequency Provider Last Rate    vitamin C  1,000 mg Oral BID Gilberto Martinez PA-C      butalbital-acetaminophen-caffeine  1 tablet Oral Q4H PRN Teresa Nuñez PA-C      cholecalciferol  1,000 Units Oral BID Gilberto Martinez PA-C      cyanocobalamin  100 mcg Oral Daily Gilberto Martinez PA-C      diphenhydrAMINE  50 mg Intravenous Q4H PRN Teresa Nuñez, RANDEE      famotidine  20 mg Intravenous Q12H KARISSA Teresa Nuñez, RANDEE      folic acid  1 mg Oral Daily Gilberto Martinez PA-C      HYDROmorphone  0.5 mg Intravenous Q4H PRN Teresa Nuñez, PA-DAYNA      HYDROmorphone  0.5 mg Intravenous Q4H PRN Teresa Nuñez, RANDEE      hydrOXYzine HCL  15 mg Oral Daily Gilberto Martinez PA-C      levothyroxine  75 mcg Oral Every Other Day Gilberto Martinez PA-C      liothyronine  5 mcg Oral Daily Gilberto Martinez PA-C      loratadine  10 mg Oral Daily Gilberto Martinez, RANDEE      Magnesium Gluconate  500 mg Oral BID Gilberto Martinez PA-C      methylPREDNISolone sodium succinate  40 mg Intravenous Daily Teresa Nuñez, RANDEE      mirtazapine  15 mg Oral HS Gilberto Martinez, PA-DAYNA      montelukast  10 mg Oral HS Gilberto Martinez, RANDEE      niacin  100 mg Oral BID With Meals Gilberto Martinez PA-C      ondansetron  4 mg Intravenous Q6H PRN Gilberto Martinez, PA-C      oxybutynin  5 mg Oral BID PRN Teresa Nuñez PA-C      sodium chloride  75 mL/hr Intravenous Continuous Gilberto Martinez PA-C  75 mL/hr (02/12/24 0720)     Continuous Infusions:sodium chloride, 75 mL/hr, Last Rate: 75 mL/hr (02/12/24 0720)      PRN Meds:    butalbital-acetaminophen-caffeine    diphenhydrAMINE    HYDROmorphone    HYDROmorphone    ondansetron    oxybutynin    Diagnostics:  CBC:   Results from last 7 days   Lab Units 02/11/24  0545 02/09/24  0515 02/08/24  0552 02/07/24  1721   WBC Thousand/uL 7.21 6.28 7.45 11.49*   HEMOGLOBIN g/dL 14.1 13.9 14.1 15.6*   HEMATOCRIT % 43.7 42.9 43.9 47.7*   PLATELETS Thousands/uL 380 374 416* 464*     BMP/CMP:  Results from last 7 days   Lab Units 02/11/24  0545 02/09/24  0515 02/08/24  0552 02/07/24  1721   POTASSIUM mmol/L 3.4* 4.0 4.1 4.2   CHLORIDE mmol/L 107 105 106 102   CO2 mmol/L 23 25 28 27   BUN mg/dL 2* 7 8 15   CREATININE mg/dL 0.63 0.68 0.69 0.84   CALCIUM mg/dL 7.6* 7.9* 7.9* 9.2     Coags:   Results from last 7 days   Lab Units 02/07/24  1746   INR  1.00   PTT seconds 24     Lipid panel:     HgbA1c:   Lab Results   Component Value Date    HGBA1C 5.3 01/29/2024    HGBA1C 5.7 (H) 11/16/2023    HGBA1C 5.9 (H) 08/08/2023    HGBA1C 5.4 09/01/2022    HGBA1C 5.4 04/27/2022    HGBA1C 5.1 03/12/2022    HGBA1C 5.3 03/04/2022    HGBA1C 5.1 02/23/2022    HGBA1C 5.2 01/28/2022    HGBA1C 5.2 01/21/2022     Urinalysis:   Lab Results   Component Value Date    COLORU Yellow 02/10/2024    COLORU Yellow 12/30/2014    CLARITYU Clear 02/10/2024    CLARITYU Clear 12/30/2014    SPECGRAV >=1.030 02/10/2024    SPECGRAV 1.015 12/30/2014    PHUR 5.5 02/10/2024    PHUR 7.0 10/17/2020    PHUR 6.5 12/30/2014    LEUKOCYTESUR (A) 02/10/2024     Elevated glucose may cause decreased leukocyte values. See urine microscopic for UWBC result    LEUKOCYTESUR Negative 12/30/2014    NITRITE Negative 02/10/2024    NITRITE Negative 12/30/2014    PROTEINUA Negative 12/30/2014    GLUCOSEU >=1000 (1%) (A) 02/10/2024    GLUCOSEU Negative 12/30/2014    KETONESU 40 (2+) (A) 02/10/2024    KETONESU 15 (1+) (A) 12/30/2014     "BILIRUBINUR Small (A) 02/10/2024    BILIRUBINUR Negative 12/30/2014    BLOODU Trace-Intact (A) 02/10/2024    BLOODU Negative 12/30/2014   ,   Urine Culture:   Lab Results   Component Value Date    URINECX No Growth <1000 cfu/mL 11/01/2019     Wound Culure: No results found for: \"WOUNDCULT\"  Blood Culture:   Lab Results   Component Value Date    BLOODCX No Growth After 5 Days. 06/11/2021    BLOODCX No Growth After 5 Days. 06/11/2021       Imaging Studies: I have personally reviewed pertinent reports.    US kidney and bladder    Result Date: 2/9/2024  Impression: Normal renal ultrasound. Workstation performed: [employer]     MRI abdomen wo contrast and mrcp    Result Date: 2/9/2024  Impression: Unchanged chronic intra and extrahepatic biliary ductal dilatation to the level of the ampulla, without evidence of common bile duct stone or obstructing mass. Workstation performed: [employer]      EKG, Pathology, and Other Studies: I have personally reviewed pertinent reports.      PHYSICAL EXAM:  GA: Pt is in NAD, appears nontoxic  Head: Normocephalic, atraumatic  Neck: Supple, midline  Cardio/Vas: RRR, normal S1/S2, no murmur noted  Pulm: normal lung effort on RA, anterior lung fields CTA  Abd: ND, soft, NT, mild bilateral lower quadrant tenderness with deep palpation  : Pt declined gynecologic/rectal exam  Extr: No LE edema, no calf tenderness  Skin: Warm, dry. No cyanosis, pallor, or rubor  MSK: No gross motor deficits, able to move extremities freely  Neuro: Speech clear, no gross neurological deficits   Psych: Appropriate mood and affect      Юлия Berger PA-C  2/12/2024   "

## 2024-02-12 NOTE — PLAN OF CARE
Problem: PAIN - ADULT  Goal: Verbalizes/displays adequate comfort level or baseline comfort level  Description: Interventions:  - Encourage patient to monitor pain and request assistance  - Assess pain using appropriate pain scale  - Administer analgesics based on type and severity of pain and evaluate response  - Implement non-pharmacological measures as appropriate and evaluate response  - Consider cultural and social influences on pain and pain management  - Notify physician/advanced practitioner if interventions unsuccessful or patient reports new pain  Outcome: Progressing     Problem: INFECTION - ADULT  Goal: Absence or prevention of progression during hospitalization  Description: INTERVENTIONS:  - Assess and monitor for signs and symptoms of infection  - Monitor lab/diagnostic results  - Monitor all insertion sites, i.e. indwelling lines  - Monitor endotracheal if appropriate and nasal secretions for changes in amount and color  - [place] appropriate cooling/warming therapies per order  - Administer medications as ordered  - Instruct and encourage patient and family to use good hand hygiene technique  - Identify and instruct in appropriate isolation precautions for identified infection/condition  Outcome: Progressing     Problem: SAFETY ADULT  Goal: Patient will remain free of falls  Description: INTERVENTIONS:  - Educate patient/family on patient safety including physical limitations  - Instruct patient to call for assistance with activity   - Consult OT/PT to assist with strengthening/mobility   - Keep Call bell within reach  - Keep bed low and locked with side rails adjusted as appropriate  - Keep care items and personal belongings within reach  - Initiate and maintain comfort rounds  - Make Fall Risk Sign visible to staff  - Offer Toileting every 4 Hours, in advance of need  - Initiate/Maintain bed alarm  - Obtain necessary fall risk management equipment  - Apply yellow socks and bracelet for high  fall risk patients  - Consider moving patient to room near nurses station  Outcome: Progressing  Goal: Maintain or return to baseline ADL function  Description: INTERVENTIONS:  -  Assess patient's ability to carry out ADLs; assess patient's baseline for ADL function and identify physical deficits which impact ability to perform ADLs (bathing, care of mouth/teeth, toileting, grooming, dressing, etc.)  - Assess/evaluate cause of self-care deficits   - Assess range of motion  - Assess patient's mobility; develop plan if impaired  - Assess patient's need for assistive devices and provide as appropriate  - Encourage maximum independence but intervene and supervise when necessary  - Involve family in performance of ADLs  - Assess for home care needs following discharge   - Consider OT consult to assist with ADL evaluation and planning for discharge  - Provide patient education as appropriate  Outcome: Progressing  Goal: Maintains/Returns to pre admission functional level  Description: INTERVENTIONS:  - Perform AM-PAC 6 Click Basic Mobility/ Daily Activity assessment daily.  - Set and communicate daily mobility goal to care team and patient/family/caregiver.   - Collaborate with rehabilitation services on mobility goals if consulted    - Out of bed for toileting  - Record patient progress and toleration of activity level   Outcome: Progressing     Problem: Nutrition/Hydration-ADULT  Goal: Nutrient/Hydration intake appropriate for improving, restoring or maintaining nutritional needs  Description: Monitor and assess patient's nutrition/hydration status for malnutrition. Collaborate with interdisciplinary team and initiate plan and interventions as ordered.  Monitor patient's weight and dietary intake as ordered or per policy. Utilize nutrition screening tool and intervene as necessary. Determine patient's food preferences and provide high-protein, high-caloric foods as appropriate.     INTERVENTIONS:  - Monitor oral intake,  urinary output, labs, and treatment plans  - Assess nutrition and hydration status and recommend course of action  - Evaluate amount of meals eaten  - Assist patient with eating if necessary   - Allow adequate time for meals  - Recommend/ encourage appropriate diets, oral nutritional supplements, and vitamin/mineral supplements  - Order, calculate, and assess calorie counts as needed  - Recommend, monitor, and adjust tube feedings and TPN/PPN based on assessed needs  - Assess need for intravenous fluids  - Provide specific nutrition/hydration education as appropriate  - Include patient/family/caregiver in decisions related to nutrition  Outcome: Progressing     Problem: GASTROINTESTINAL - ADULT  Goal: Minimal or absence of nausea and/or vomiting  Description: INTERVENTIONS:  - Administer IV fluids if ordered to ensure adequate hydration  - Maintain NPO status until nausea and vomiting are resolved  - Nasogastric tube if ordered  - Administer ordered antiemetic medications as needed  - Provide nonpharmacologic comfort measures as appropriate  - Advance diet as tolerated, if ordered  - Consider nutrition services referral to assist patient with adequate nutrition and appropriate food choices  Outcome: Progressing  Goal: Maintains or returns to baseline bowel function  Description: INTERVENTIONS:  - Assess bowel function  - Encourage oral fluids to ensure adequate hydration  - Administer IV fluids if ordered to ensure adequate hydration  - Administer ordered medications as needed  - Encourage mobilization and activity  - Consider nutritional services referral to assist patient with adequate nutrition and appropriate food choices  Outcome: Progressing  Goal: Maintains adequate nutritional intake  Description: INTERVENTIONS:  - Monitor percentage of each meal consumed  - Identify factors contributing to decreased intake, treat as appropriate  - Assist with meals as needed  - Monitor I&O, weight, and lab values if  indicated  - Obtain nutrition services referral as needed  Outcome: Progressing     Problem: METABOLIC, FLUID AND ELECTROLYTES - ADULT  Goal: Electrolytes maintained within normal limits  Description: INTERVENTIONS:  - Monitor labs and assess patient for signs and symptoms of electrolyte imbalances  - Administer electrolyte replacement as ordered  - Monitor response to electrolyte replacements, including repeat lab results as appropriate  - Instruct patient on fluid and nutrition as appropriate  Outcome: Progressing  Goal: Fluid balance maintained  Description: INTERVENTIONS:  - Monitor labs   - Monitor I/O and WT  - Instruct patient on fluid and nutrition as appropriate  - Assess for signs & symptoms of volume excess or deficit  Outcome: Progressing     Problem: Prexisting or High Potential for Compromised Skin Integrity  Goal: Skin integrity is maintained or improved  Description: INTERVENTIONS:  - Identify patients at risk for skin breakdown  - Assess and monitor skin integrity  - Assess and monitor nutrition and hydration status  - Monitor labs   - Assess for incontinence   - Turn and reposition patient  - Assist with mobility/ambulation  - Relieve pressure over bony prominences  - Avoid friction and shearing  - Provide appropriate hygiene as needed including keeping skin clean and dry  - Evaluate need for skin moisturizer/barrier cream  - Collaborate with interdisciplinary team   - Patient/family teaching  - Consider wound care consult   Outcome: Progressing

## 2024-02-12 NOTE — PLAN OF CARE
Problem: PAIN - ADULT  Goal: Verbalizes/displays adequate comfort level or baseline comfort level  Description: Interventions:  - Encourage patient to monitor pain and request assistance  - Assess pain using appropriate pain scale  - Administer analgesics based on type and severity of pain and evaluate response  - Implement non-pharmacological measures as appropriate and evaluate response  - Consider cultural and social influences on pain and pain management  - Notify physician/advanced practitioner if interventions unsuccessful or patient reports new pain  2/12/2024 1752 by Temo Barrios RN  Outcome: Progressing  2/12/2024 1751 by Temo Barrios RN  Outcome: Progressing     Problem: INFECTION - ADULT  Goal: Absence or prevention of progression during hospitalization  Description: INTERVENTIONS:  - Assess and monitor for signs and symptoms of infection  - Monitor lab/diagnostic results  - Monitor all insertion sites, i.e. indwelling lines  - Monitor endotracheal if appropriate and nasal secretions for changes in amount and color  - [place] appropriate cooling/warming therapies per order  - Administer medications as ordered  - Instruct and encourage patient and family to use good hand hygiene technique  - Identify and instruct in appropriate isolation precautions for identified infection/condition  2/12/2024 1752 by Temo Barrios RN  Outcome: Progressing  2/12/2024 1751 by Temo Barrios RN  Outcome: Progressing     Problem: SAFETY ADULT  Goal: Patient will remain free of falls  Description: INTERVENTIONS:  - Educate patient/family on patient safety including physical limitations  - Instruct patient to call for assistance with activity   - Consult OT/PT to assist with strengthening/mobility   - Keep Call bell within reach  - Keep bed low and locked with side rails adjusted as appropriate  - Keep care items and personal belongings within reach  - Initiate and maintain comfort rounds  - Make Fall Risk Sign  visible to staff  - Offer Toileting every 4 Hours, in advance of need  - Initiate/Maintain bed alarm  - Obtain necessary fall risk management equipment  - Apply yellow socks and bracelet for high fall risk patients  - Consider moving patient to room near nurses station  2/12/2024 1752 by Temo Barrios RN  Outcome: Progressing  2/12/2024 1751 by Temo Barrios RN  Outcome: Progressing  Goal: Maintain or return to baseline ADL function  Description: INTERVENTIONS:  -  Assess patient's ability to carry out ADLs; assess patient's baseline for ADL function and identify physical deficits which impact ability to perform ADLs (bathing, care of mouth/teeth, toileting, grooming, dressing, etc.)  - Assess/evaluate cause of self-care deficits   - Assess range of motion  - Assess patient's mobility; develop plan if impaired  - Assess patient's need for assistive devices and provide as appropriate  - Encourage maximum independence but intervene and supervise when necessary  - Involve family in performance of ADLs  - Assess for home care needs following discharge   - Consider OT consult to assist with ADL evaluation and planning for discharge  - Provide patient education as appropriate  2/12/2024 1752 by Temo Barrios RN  Outcome: Progressing  2/12/2024 1751 by Temo Barrios RN  Outcome: Progressing  Goal: Maintains/Returns to pre admission functional level  Description: INTERVENTIONS:  - Perform AM-PAC 6 Click Basic Mobility/ Daily Activity assessment daily.  - Set and communicate daily mobility goal to care team and patient/family/caregiver.   - Collaborate with rehabilitation services on mobility goals if consulted  - Perform Range of Motion 3 times a day.  - Reposition patient every 2 hours.  - Dangle patient 3 times a day  - Stand patient 3 times a day  - Ambulate patient 3 times a day  - Out of bed to chair 3 times a day   - Out of bed for meals 3 times a day  - Out of bed for toileting  - Record patient progress and  toleration of activity level   2/12/2024 1752 by Temo Barrios RN  Outcome: Progressing  2/12/2024 1751 by Temo Barrios RN  Outcome: Progressing     Problem: Nutrition/Hydration-ADULT  Goal: Nutrient/Hydration intake appropriate for improving, restoring or maintaining nutritional needs  Description: Monitor and assess patient's nutrition/hydration status for malnutrition. Collaborate with interdisciplinary team and initiate plan and interventions as ordered.  Monitor patient's weight and dietary intake as ordered or per policy. Utilize nutrition screening tool and intervene as necessary. Determine patient's food preferences and provide high-protein, high-caloric foods as appropriate.     INTERVENTIONS:  - Monitor oral intake, urinary output, labs, and treatment plans  - Assess nutrition and hydration status and recommend course of action  - Evaluate amount of meals eaten  - Assist patient with eating if necessary   - Allow adequate time for meals  - Recommend/ encourage appropriate diets, oral nutritional supplements, and vitamin/mineral supplements  - Order, calculate, and assess calorie counts as needed  - Recommend, monitor, and adjust tube feedings and TPN/PPN based on assessed needs  - Assess need for intravenous fluids  - Provide specific nutrition/hydration education as appropriate  - Include patient/family/caregiver in decisions related to nutrition  2/12/2024 1752 by Temo Barrios RN  Outcome: Progressing  2/12/2024 1751 by Temo Barrios RN  Outcome: Progressing     Problem: GASTROINTESTINAL - ADULT  Goal: Minimal or absence of nausea and/or vomiting  Description: INTERVENTIONS:  - Administer IV fluids if ordered to ensure adequate hydration  - Maintain NPO status until nausea and vomiting are resolved  - Nasogastric tube if ordered  - Administer ordered antiemetic medications as needed  - Provide nonpharmacologic comfort measures as appropriate  - Advance diet as tolerated, if ordered  - Consider  nutrition services referral to assist patient with adequate nutrition and appropriate food choices  2/12/2024 1752 by Temo Barrios RN  Outcome: Progressing  2/12/2024 1751 by Temo Barrios RN  Outcome: Progressing  Goal: Maintains or returns to baseline bowel function  Description: INTERVENTIONS:  - Assess bowel function  - Encourage oral fluids to ensure adequate hydration  - Administer IV fluids if ordered to ensure adequate hydration  - Administer ordered medications as needed  - Encourage mobilization and activity  - Consider nutritional services referral to assist patient with adequate nutrition and appropriate food choices  2/12/2024 1752 by Temo Barrios RN  Outcome: Progressing  2/12/2024 1751 by Temo Barrios RN  Outcome: Progressing  Goal: Maintains adequate nutritional intake  Description: INTERVENTIONS:  - Monitor percentage of each meal consumed  - Identify factors contributing to decreased intake, treat as appropriate  - Assist with meals as needed  - Monitor I&O, weight, and lab values if indicated  - Obtain nutrition services referral as needed  2/12/2024 1752 by Temo Barrios RN  Outcome: Progressing  2/12/2024 1751 by Temo Barrios RN  Outcome: Progressing     Problem: METABOLIC, FLUID AND ELECTROLYTES - ADULT  Goal: Electrolytes maintained within normal limits  Description: INTERVENTIONS:  - Monitor labs and assess patient for signs and symptoms of electrolyte imbalances  - Administer electrolyte replacement as ordered  - Monitor response to electrolyte replacements, including repeat lab results as appropriate  - Instruct patient on fluid and nutrition as appropriate  2/12/2024 1752 by Temo Barrios RN  Outcome: Progressing  2/12/2024 1751 by Temo Barrios RN  Outcome: Progressing  Goal: Fluid balance maintained  Description: INTERVENTIONS:  - Monitor labs   - Monitor I/O and WT  - Instruct patient on fluid and nutrition as appropriate  - Assess for signs & symptoms of volume excess or  deficit  2/12/2024 1752 by Temo Barrios RN  Outcome: Progressing  2/12/2024 1751 by Temo Barrios RN  Outcome: Progressing     Problem: Prexisting or High Potential for Compromised Skin Integrity  Goal: Skin integrity is maintained or improved  Description: INTERVENTIONS:  - Identify patients at risk for skin breakdown  - Assess and monitor skin integrity  - Assess and monitor nutrition and hydration status  - Monitor labs   - Assess for incontinence   - Turn and reposition patient  - Assist with mobility/ambulation  - Relieve pressure over bony prominences  - Avoid friction and shearing  - Provide appropriate hygiene as needed including keeping skin clean and dry  - Evaluate need for skin moisturizer/barrier cream  - Collaborate with interdisciplinary team   - Patient/family teaching  - Consider wound care consult   2/12/2024 1752 by Temo Barrios RN  Outcome: Progressing  2/12/2024 1751 by Temo Barrios RN  Outcome: Progressing

## 2024-02-12 NOTE — NURSING NOTE
Educated patient on policy about IV site needing to be changed. Patient requested that staff not change her IV at this time due to her possibly getting transferred. Patient understood that it needed to be done but just requested it to be done at later time. Provider YULIA Rogers notified via tiger connect.

## 2024-02-12 NOTE — ASSESSMENT & PLAN NOTE
Reports decreased urine output, having to push to urinate, and bladder spasms  No significant amounts of urine on bladder scan  No hydroureteronephrosis on renal US  Discussed with urology, can trial ditropan but will have to continue with bladder scans  Appreciate urology input

## 2024-02-13 ENCOUNTER — HOSPITAL ENCOUNTER (INPATIENT)
Facility: HOSPITAL | Age: 49
LOS: 23 days | Discharge: HOME | DRG: 444 | End: 2024-03-07
Attending: INTERNAL MEDICINE | Admitting: INTERNAL MEDICINE
Payer: COMMERCIAL

## 2024-02-13 VITALS
RESPIRATION RATE: 16 BRPM | HEART RATE: 80 BPM | OXYGEN SATURATION: 96 % | BODY MASS INDEX: 27.42 KG/M2 | TEMPERATURE: 98.3 F | HEIGHT: 65 IN | SYSTOLIC BLOOD PRESSURE: 102 MMHG | WEIGHT: 164.6 LBS | DIASTOLIC BLOOD PRESSURE: 72 MMHG

## 2024-02-13 DIAGNOSIS — N80.9 ENDOMETRIOSIS: ICD-10-CM

## 2024-02-13 DIAGNOSIS — R10.84 GENERALIZED ABDOMINAL PAIN: ICD-10-CM

## 2024-02-13 DIAGNOSIS — A41.9 SEPSIS WITHOUT ACUTE ORGAN DYSFUNCTION, DUE TO UNSPECIFIED ORGANISM (HCC): ICD-10-CM

## 2024-02-13 DIAGNOSIS — R10.11 INTRACTABLE RIGHT UPPER QUADRANT ABDOMINAL PAIN: Primary | ICD-10-CM

## 2024-02-13 DIAGNOSIS — K50.119 CROHN'S DISEASE OF COLON WITH COMPLICATION (HCC): ICD-10-CM

## 2024-02-13 PROBLEM — I50.9 CHF (CONGESTIVE HEART FAILURE) (HCC): Status: RESOLVED | Noted: 2017-11-29 | Resolved: 2024-02-13

## 2024-02-13 LAB
ALBUMIN SERPL BCP-MCNC: 3.3 G/DL (ref 3.5–5)
ALP SERPL-CCNC: 46 U/L (ref 34–104)
ALT SERPL W P-5'-P-CCNC: 18 U/L (ref 7–52)
ANION GAP SERPL CALCULATED.3IONS-SCNC: 7 MMOL/L
AST SERPL W P-5'-P-CCNC: 11 U/L (ref 13–39)
BILIRUB SERPL-MCNC: 0.29 MG/DL (ref 0.2–1)
BUN SERPL-MCNC: 6 MG/DL (ref 5–25)
CALCIUM ALBUM COR SERPL-MCNC: 8.3 MG/DL (ref 8.3–10.1)
CALCIUM SERPL-MCNC: 7.7 MG/DL (ref 8.4–10.2)
CHLORIDE SERPL-SCNC: 108 MMOL/L (ref 96–108)
CO2 SERPL-SCNC: 26 MMOL/L (ref 21–32)
CREAT SERPL-MCNC: 0.63 MG/DL (ref 0.6–1.3)
GFR SERPL CREATININE-BSD FRML MDRD: 104 ML/MIN/1.73SQ M
GLUCOSE SERPL-MCNC: 102 MG/DL (ref 65–140)
MAGNESIUM SERPL-MCNC: 1.9 MG/DL (ref 1.9–2.7)
POTASSIUM SERPL-SCNC: 3.1 MMOL/L (ref 3.5–5.3)
PROT SERPL-MCNC: 5.3 G/DL (ref 6.4–8.4)
SODIUM SERPL-SCNC: 141 MMOL/L (ref 135–147)

## 2024-02-13 PROCEDURE — 99232 SBSQ HOSP IP/OBS MODERATE 35: CPT | Performed by: INTERNAL MEDICINE

## 2024-02-13 PROCEDURE — 83735 ASSAY OF MAGNESIUM: CPT

## 2024-02-13 PROCEDURE — 99239 HOSP IP/OBS DSCHRG MGMT >30: CPT

## 2024-02-13 PROCEDURE — NC001 PR NO CHARGE: Performed by: INTERNAL MEDICINE

## 2024-02-13 PROCEDURE — 99223 1ST HOSP IP/OBS HIGH 75: CPT | Performed by: INTERNAL MEDICINE

## 2024-02-13 PROCEDURE — 80053 COMPREHEN METABOLIC PANEL: CPT | Performed by: PHYSICIAN ASSISTANT

## 2024-02-13 RX ORDER — METHYLPREDNISOLONE SODIUM SUCCINATE 40 MG/ML
40 INJECTION, POWDER, LYOPHILIZED, FOR SOLUTION INTRAMUSCULAR; INTRAVENOUS DAILY
Status: DISCONTINUED | OUTPATIENT
Start: 2024-02-14 | End: 2024-02-15

## 2024-02-13 RX ORDER — HYDROMORPHONE HCL/PF 1 MG/ML
0.5 SYRINGE (ML) INJECTION EVERY 4 HOURS PRN
Status: DISCONTINUED | OUTPATIENT
Start: 2024-02-13 | End: 2024-02-22

## 2024-02-13 RX ORDER — HYDROXYZINE HYDROCHLORIDE 10 MG/1
15 TABLET, FILM COATED ORAL DAILY
Status: DISCONTINUED | OUTPATIENT
Start: 2024-02-14 | End: 2024-03-07 | Stop reason: HOSPADM

## 2024-02-13 RX ORDER — FOLIC ACID 1 MG/1
1 TABLET ORAL DAILY
Status: CANCELLED | OUTPATIENT
Start: 2024-02-14

## 2024-02-13 RX ORDER — MIRTAZAPINE 15 MG/1
15 TABLET, FILM COATED ORAL
Status: CANCELLED | OUTPATIENT
Start: 2024-02-13

## 2024-02-13 RX ORDER — FAMOTIDINE 10 MG/ML
20 INJECTION, SOLUTION INTRAVENOUS EVERY 12 HOURS SCHEDULED
Status: CANCELLED | OUTPATIENT
Start: 2024-02-13

## 2024-02-13 RX ORDER — NIACIN 100 MG
100 TABLET ORAL 2 TIMES DAILY WITH MEALS
Status: DISCONTINUED | OUTPATIENT
Start: 2024-02-13 | End: 2024-02-13

## 2024-02-13 RX ORDER — FOLIC ACID 1 MG/1
1 TABLET ORAL DAILY
Status: DISCONTINUED | OUTPATIENT
Start: 2024-02-14 | End: 2024-02-24

## 2024-02-13 RX ORDER — LORATADINE 10 MG/1
10 TABLET ORAL DAILY
Status: DISCONTINUED | OUTPATIENT
Start: 2024-02-14 | End: 2024-02-24

## 2024-02-13 RX ORDER — HYDROMORPHONE HCL IN WATER/PF 6 MG/30 ML
0.2 PATIENT CONTROLLED ANALGESIA SYRINGE INTRAVENOUS ONCE
Status: COMPLETED | OUTPATIENT
Start: 2024-02-13 | End: 2024-02-13

## 2024-02-13 RX ORDER — ASCORBIC ACID 500 MG
1000 TABLET ORAL 2 TIMES DAILY
Status: DISCONTINUED | OUTPATIENT
Start: 2024-02-13 | End: 2024-02-13

## 2024-02-13 RX ORDER — MIRTAZAPINE 15 MG/1
15 TABLET, FILM COATED ORAL
Status: DISCONTINUED | OUTPATIENT
Start: 2024-02-13 | End: 2024-03-07 | Stop reason: HOSPADM

## 2024-02-13 RX ORDER — POTASSIUM CHLORIDE 14.9 MG/ML
20 INJECTION INTRAVENOUS
Qty: 200 ML | Refills: 0 | Status: DISCONTINUED | OUTPATIENT
Start: 2024-02-13 | End: 2024-02-13 | Stop reason: HOSPADM

## 2024-02-13 RX ORDER — FOLIC ACID 1 MG/1
1 TABLET ORAL DAILY
Status: DISCONTINUED | OUTPATIENT
Start: 2024-02-14 | End: 2024-02-13

## 2024-02-13 RX ORDER — MULTIVIT,TX WITH IRON,MINERALS
500 TABLET, EXTENDED RELEASE ORAL 2 TIMES DAILY
Status: DISCONTINUED | OUTPATIENT
Start: 2024-02-13 | End: 2024-02-13

## 2024-02-13 RX ORDER — SODIUM CHLORIDE 9 MG/ML
75 INJECTION, SOLUTION INTRAVENOUS CONTINUOUS
Status: CANCELLED | OUTPATIENT
Start: 2024-02-13

## 2024-02-13 RX ORDER — MELATONIN
1000 2 TIMES DAILY
Status: DISCONTINUED | OUTPATIENT
Start: 2024-02-13 | End: 2024-02-13

## 2024-02-13 RX ORDER — HYDROMORPHONE HCL/PF 1 MG/ML
0.5 SYRINGE (ML) INJECTION EVERY 4 HOURS PRN
Status: DISCONTINUED | OUTPATIENT
Start: 2024-02-13 | End: 2024-02-13

## 2024-02-13 RX ORDER — DIPHENHYDRAMINE HYDROCHLORIDE 50 MG/ML
50 INJECTION INTRAMUSCULAR; INTRAVENOUS EVERY 4 HOURS PRN
Status: CANCELLED | OUTPATIENT
Start: 2024-02-13

## 2024-02-13 RX ORDER — LEVOTHYROXINE SODIUM 0.07 MG/1
75 TABLET ORAL EVERY OTHER DAY
Status: CANCELLED | OUTPATIENT
Start: 2024-02-14

## 2024-02-13 RX ORDER — LORATADINE 10 MG/1
10 TABLET ORAL DAILY
Status: CANCELLED | OUTPATIENT
Start: 2024-02-14

## 2024-02-13 RX ORDER — DIPHENHYDRAMINE HYDROCHLORIDE 50 MG/ML
50 INJECTION INTRAMUSCULAR; INTRAVENOUS EVERY 4 HOURS PRN
Status: DISCONTINUED | OUTPATIENT
Start: 2024-02-13 | End: 2024-03-07 | Stop reason: HOSPADM

## 2024-02-13 RX ORDER — HYDROMORPHONE HCL/PF 1 MG/ML
0.5 SYRINGE (ML) INJECTION EVERY 4 HOURS PRN
Status: CANCELLED | OUTPATIENT
Start: 2024-02-13

## 2024-02-13 RX ORDER — LIOTHYRONINE SODIUM 5 UG/1
5 TABLET ORAL DAILY
Status: CANCELLED | OUTPATIENT
Start: 2024-02-14

## 2024-02-13 RX ORDER — OXYBUTYNIN CHLORIDE 5 MG/1
5 TABLET ORAL 2 TIMES DAILY PRN
Status: DISCONTINUED | OUTPATIENT
Start: 2024-02-13 | End: 2024-03-07 | Stop reason: HOSPADM

## 2024-02-13 RX ORDER — ASCORBIC ACID 500 MG
1000 TABLET ORAL 2 TIMES DAILY
Status: CANCELLED | OUTPATIENT
Start: 2024-02-13

## 2024-02-13 RX ORDER — FAMOTIDINE 10 MG/ML
20 INJECTION, SOLUTION INTRAVENOUS EVERY 12 HOURS SCHEDULED
Status: DISCONTINUED | OUTPATIENT
Start: 2024-02-13 | End: 2024-02-15

## 2024-02-13 RX ORDER — NIACIN 100 MG
100 TABLET ORAL 2 TIMES DAILY WITH MEALS
Status: CANCELLED | OUTPATIENT
Start: 2024-02-13

## 2024-02-13 RX ORDER — HYDROMORPHONE HCL/PF 1 MG/ML
0.5 SYRINGE (ML) INJECTION ONCE
Status: COMPLETED | OUTPATIENT
Start: 2024-02-13 | End: 2024-02-13

## 2024-02-13 RX ORDER — OXYBUTYNIN CHLORIDE 5 MG/1
5 TABLET ORAL 2 TIMES DAILY PRN
Status: CANCELLED | OUTPATIENT
Start: 2024-02-13

## 2024-02-13 RX ORDER — MONTELUKAST SODIUM 10 MG/1
10 TABLET ORAL
Status: DISCONTINUED | OUTPATIENT
Start: 2024-02-13 | End: 2024-03-02 | Stop reason: CLARIF

## 2024-02-13 RX ORDER — BUTALBITAL, ACETAMINOPHEN AND CAFFEINE 50; 325; 40 MG/1; MG/1; MG/1
1 TABLET ORAL EVERY 4 HOURS PRN
Status: CANCELLED | OUTPATIENT
Start: 2024-02-13

## 2024-02-13 RX ORDER — ONDANSETRON 2 MG/ML
4 INJECTION INTRAMUSCULAR; INTRAVENOUS EVERY 6 HOURS PRN
Status: DISCONTINUED | OUTPATIENT
Start: 2024-02-13 | End: 2024-03-07 | Stop reason: HOSPADM

## 2024-02-13 RX ORDER — MONTELUKAST SODIUM 10 MG/1
10 TABLET ORAL
Status: CANCELLED | OUTPATIENT
Start: 2024-02-13

## 2024-02-13 RX ORDER — MELATONIN
1000 2 TIMES DAILY
Status: CANCELLED | OUTPATIENT
Start: 2024-02-13

## 2024-02-13 RX ORDER — MULTIVIT,TX WITH IRON,MINERALS
500 TABLET, EXTENDED RELEASE ORAL 2 TIMES DAILY
Status: CANCELLED | OUTPATIENT
Start: 2024-02-13

## 2024-02-13 RX ORDER — HYDROXYZINE HYDROCHLORIDE 10 MG/1
15 TABLET, FILM COATED ORAL DAILY
Status: CANCELLED | OUTPATIENT
Start: 2024-02-14

## 2024-02-13 RX ORDER — METHYLPREDNISOLONE SODIUM SUCCINATE 40 MG/ML
40 INJECTION, POWDER, LYOPHILIZED, FOR SOLUTION INTRAMUSCULAR; INTRAVENOUS DAILY
Status: CANCELLED | OUTPATIENT
Start: 2024-02-14

## 2024-02-13 RX ORDER — SODIUM CHLORIDE 9 MG/ML
75 INJECTION, SOLUTION INTRAVENOUS CONTINUOUS
Status: DISCONTINUED | OUTPATIENT
Start: 2024-02-13 | End: 2024-02-14

## 2024-02-13 RX ORDER — ONDANSETRON 2 MG/ML
4 INJECTION INTRAMUSCULAR; INTRAVENOUS EVERY 6 HOURS PRN
Status: CANCELLED | OUTPATIENT
Start: 2024-02-13

## 2024-02-13 RX ORDER — LEVOTHYROXINE SODIUM 0.07 MG/1
75 TABLET ORAL EVERY OTHER DAY
Status: DISCONTINUED | OUTPATIENT
Start: 2024-02-14 | End: 2024-02-24

## 2024-02-13 RX ORDER — POTASSIUM CHLORIDE 14.9 MG/ML
20 INJECTION INTRAVENOUS ONCE
Qty: 100 ML | Refills: 0 | Status: COMPLETED | OUTPATIENT
Start: 2024-02-13 | End: 2024-02-13

## 2024-02-13 RX ORDER — BUTALBITAL, ACETAMINOPHEN AND CAFFEINE 50; 325; 40 MG/1; MG/1; MG/1
1 TABLET ORAL EVERY 4 HOURS PRN
Status: DISCONTINUED | OUTPATIENT
Start: 2024-02-13 | End: 2024-03-07 | Stop reason: HOSPADM

## 2024-02-13 RX ORDER — POTASSIUM CHLORIDE 20 MEQ/1
40 TABLET, EXTENDED RELEASE ORAL ONCE
Status: DISCONTINUED | OUTPATIENT
Start: 2024-02-13 | End: 2024-02-13

## 2024-02-13 RX ORDER — LIOTHYRONINE SODIUM 5 UG/1
5 TABLET ORAL DAILY
Status: DISCONTINUED | OUTPATIENT
Start: 2024-02-14 | End: 2024-02-24

## 2024-02-13 RX ORDER — POTASSIUM CHLORIDE 14.9 MG/ML
20 INJECTION INTRAVENOUS
Status: CANCELLED | OUTPATIENT
Start: 2024-02-13 | End: 2024-02-13

## 2024-02-13 RX ADMIN — FAMOTIDINE 20 MG: 10 INJECTION, SOLUTION INTRAVENOUS at 08:55

## 2024-02-13 RX ADMIN — FAMOTIDINE 20 MG: 10 INJECTION INTRAVENOUS at 21:18

## 2024-02-13 RX ADMIN — SODIUM CHLORIDE 75 ML/HR: 0.9 INJECTION, SOLUTION INTRAVENOUS at 15:59

## 2024-02-13 RX ADMIN — POTASSIUM CHLORIDE 20 MEQ: 14.9 INJECTION, SOLUTION INTRAVENOUS at 12:11

## 2024-02-13 RX ADMIN — DIPHENHYDRAMINE HYDROCHLORIDE 50 MG: 50 INJECTION, SOLUTION INTRAMUSCULAR; INTRAVENOUS at 13:18

## 2024-02-13 RX ADMIN — DIPHENHYDRAMINE HYDROCHLORIDE 50 MG: 50 INJECTION, SOLUTION INTRAMUSCULAR; INTRAVENOUS at 21:17

## 2024-02-13 RX ADMIN — HYDROMORPHONE HYDROCHLORIDE 0.5 MG: 0.5 INJECTION, SOLUTION INTRAMUSCULAR; INTRAVENOUS; SUBCUTANEOUS at 15:59

## 2024-02-13 RX ADMIN — SODIUM CHLORIDE 75 ML/HR: 0.9 INJECTION, SOLUTION INTRAVENOUS at 09:05

## 2024-02-13 RX ADMIN — HYDROMORPHONE HYDROCHLORIDE 0.5 MG: 1 INJECTION, SOLUTION INTRAMUSCULAR; INTRAVENOUS; SUBCUTANEOUS at 21:23

## 2024-02-13 RX ADMIN — DIPHENHYDRAMINE HYDROCHLORIDE 50 MG: 50 INJECTION, SOLUTION INTRAMUSCULAR; INTRAVENOUS at 17:04

## 2024-02-13 RX ADMIN — HYDROMORPHONE HYDROCHLORIDE 0.2 MG: 0.2 INJECTION, SOLUTION INTRAMUSCULAR; INTRAVENOUS; SUBCUTANEOUS at 20:53

## 2024-02-13 RX ADMIN — POTASSIUM CHLORIDE 20 MEQ: 14.9 INJECTION, SOLUTION INTRAVENOUS at 15:59

## 2024-02-13 RX ADMIN — HYDROMORPHONE HYDROCHLORIDE 0.5 MG: 1 INJECTION, SOLUTION INTRAMUSCULAR; INTRAVENOUS; SUBCUTANEOUS at 10:03

## 2024-02-13 RX ADMIN — DIPHENHYDRAMINE HYDROCHLORIDE 50 MG: 50 INJECTION, SOLUTION INTRAMUSCULAR; INTRAVENOUS at 08:58

## 2024-02-13 RX ADMIN — METHYLPREDNISOLONE SODIUM SUCCINATE 40 MG: 40 INJECTION, POWDER, FOR SOLUTION INTRAMUSCULAR; INTRAVENOUS at 08:55

## 2024-02-13 RX ADMIN — HYDROMORPHONE HYDROCHLORIDE 0.5 MG: 1 INJECTION, SOLUTION INTRAMUSCULAR; INTRAVENOUS; SUBCUTANEOUS at 12:46

## 2024-02-13 RX ADMIN — HYDROMORPHONE HYDROCHLORIDE 0.5 MG: 1 INJECTION, SOLUTION INTRAMUSCULAR; INTRAVENOUS; SUBCUTANEOUS at 13:50

## 2024-02-13 RX ADMIN — MONTELUKAST 10 MG: 10 TABLET, FILM COATED ORAL at 21:17

## 2024-02-13 RX ADMIN — MIRTAZAPINE 15 MG: 15 TABLET, FILM COATED ORAL at 21:17

## 2024-02-13 RX ADMIN — ONDANSETRON 4 MG: 2 INJECTION INTRAMUSCULAR; INTRAVENOUS at 13:44

## 2024-02-13 RX ADMIN — HYDROMORPHONE HYDROCHLORIDE 0.5 MG: 1 INJECTION, SOLUTION INTRAMUSCULAR; INTRAVENOUS; SUBCUTANEOUS at 08:25

## 2024-02-13 NOTE — PROGRESS NOTES
[facility]  Progress Note - Celine Roa 48 y.o. female MRN: [id number]    Unit/Bed#: 402-01 Encounter: [id number]    Assessment/Plan:  Celine Roa is a 48 y.o. 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  Repeat ERCP in 2017 showed the sphincterotomy appeared open.  CT chest abdomen pelvis did show intra and extrahepatic ductal dilatation which had been noted on prior imaging with concern for possible stricture.  Notably her alkaline phosphatase and bilirubin are normal.  Her CMP is otherwise relatively unremarkable aside from low total protein and albumin.  Her CBC is notable for normal white blood cell count, hemoglobin, platelets.  She does have mild macrocytosis with MCV of 100.  Prior endoscopic evaluations noted including September EGD that was normal in appearance and flexible sigmoidoscopy that showed some abnormal mucosa consistent with Crohn's in the sigmoid and rectosigmoid but normal descending colon. MRI/RMCP on this admission with unchanged intra and extrahepatic biliary ductal dilatation to the level of the ampulla.    Overall, there could be a potential biliary stricture although this is not likely the cause of her pain given the relatively normal LFTs (but it is still possible).   Case discussed with Dr. Soto.   Will plan for ERCP as a boomerang or transfer  Trend LFTs  Continue remeron  Continue hydroxyzine.   Continue B!2, folic acid  Continue pepcid  Continue pain control and anti-nausea medications as  "needed      Subjective:   The patient was seen this morning.  She reports that she was having diarrhea the past few days but it was a little bit better in the past day.  She was having significant trouble taking in food as this would cause diarrhea but she has been able to eat a little bit better.  She continues to have right upper quadrant abdominal pain that comes and goes.  She has also been having some nausea and vomiting.    Objective:     Vitals: Blood pressure 102/72, pulse 80, temperature 98.3 °F (36.8 °C), resp. rate 16, height 5' 5.25\" (1.657 m), weight 74.7 kg (164 lb 9.6 oz), SpO2 96%, not currently breastfeeding.,Body mass index is 27.19 kg/m².      Intake/Output Summary (Last 24 hours) at 2/13/2024 0945  Last data filed at 2/13/2024 0905  Gross per 24 hour   Intake 1360 ml   Output --   Net 1360 ml       Review of systems:  10 point ROS reviewed and negative, except as above        PHYSICAL EXAMINATION:    General Appearance:   Alert, cooperative, no distress   HEENT:  Normocephalic, atraumatic, anicteric. Neck supple, symmetrical, trachea midline.   Lungs:   Equal chest rise and unlabored breathing, normal effort, no coughing.   Cardiovascular:   No visualized JVD.   Abdomen:   No abdominal distension.   Skin:   No jaundice, rashes, or lesions.    Musculoskeletal:   Normal range of motion visualized.   Psych:  Normal affect and normal insight.   Neuro:  Alert and appropriate.           Invasive Devices       Peripheral Intravenous Line  Duration             Peripheral IV 02/07/24 Upper;Ventral (anterior);Left Arm 5 days              Line  Duration             Pump Device  -- days                            CBC: No results found for: \"WBC\", \"HGB\", \"HCT\", \"MCV\", \"PLT\", \"ADJUSTEDWBC\", \"RBC\", \"MCH\", \"MCHC\", \"RDW\", \"MPV\", \"NRBC\",   CMP:   Lab Results   Component Value Date    K 3.1 (L) 02/13/2024     02/13/2024    CO2 26 02/13/2024    BUN 6 02/13/2024    CREATININE 0.63 02/13/2024    CALCIUM 7.7 (L) " "02/13/2024    AST 11 (L) 02/13/2024    ALT 18 02/13/2024    ALKPHOS 46 02/13/2024    EGFR 104 02/13/2024   ,   Lipase: No results found for: \"LIPASE\",  PT/INR: No results found for: \"PT\", \"INR\",   Troponin: No results found for: \"TROPONINI\",   Magnesium: No components found for: \"MAG\",   Phosphorous: No results found for: \"PHOS\"  Imaging Studies: I have personally reviewed pertinent reports.   and I have personally reviewed pertinent films in PACS  "

## 2024-02-13 NOTE — ASSESSMENT & PLAN NOTE
Patient with history of Crohn's disease she is on Skyrizi as well as intravenous methotrexate which he takes on Thursday via infusion.  Given location of symptoms suspect that this is likely related to biliary source.  She did have stent placed in 2014.  Symptoms present since July.  Transferred for ERCP  Also with history of endometriosis, was reviewed with Dr. Jennings, he does not believe that this is related.  He recommends outpatient follow-up.  Patient will follow-up with Dr. Benito as previously instructed.  MRCP without any evidence of obvious obstruction, she is receiving significant amounts of opioids to control her pain  N.p.o. after midnight for ERCP  Patient requires IV pepcid and IV solumedrol prior to procedures, initiated on 2/11 at [facility]

## 2024-02-13 NOTE — ASSESSMENT & PLAN NOTE
Continue prior to admission medication  Continue outpatient immunology follow-up with [facility]  Patient reports intolerance to all medications with corn.

## 2024-02-13 NOTE — UTILIZATION REVIEW
NOTIFICATION OF INPATIENT ADMISSION   AUTHORIZATION REQUEST   SERVICING FACILITY:   [place] [address]  Tax ID:  [id number]  NPI: [id number] ATTENDING PROVIDER:  Attending Name and NPI#: Hair Noland Do [[id number]]  Address: [address] [place] [address]  Phone: [phone]     ADMISSION INFORMATION:  Place of Service: Inpatient [facility]  Place of Service Code: 21  Inpatient Admission Date/Time: 2/9/24  1:00 PM  Discharge Date/Time: No discharge date for patient encounter.  Admitting Diagnosis Code/Description:  Leukocytosis [D72.829]  Nausea [R11.0]  Abdominal pain [R10.9]     UTILIZATION REVIEW CONTACT:  Scott Hawkins Utilization   Network Utilization Review Department  Phone: [phone]  Fax [phone]  Email: [email or web address]  Contact for approvals/pending authorizations, clinical reviews, and discharge.     PHYSICIAN ADVISORY SERVICES:  Medical Necessity Denial & [facility]th[facility] Review  Phone: [phone]  Fax: [phone]  Email: [email or web address]     DISCHARGE SUPPORT TEAM:  For Patients Discharge Needs & Updates  Phone: [phone] opt. 2 Fax: [phone]  Email: [email or web address]

## 2024-02-13 NOTE — DISCHARGE SUMMARY
[facility]  Discharge- Celine Roa 1975, 48 y.o. female MRN: [id number]  Unit/Bed#: 402-01 Encounter: [id number]  Primary Care Provider: Olaf Rhodes MD   Date and time admitted to hospital: 2/7/2024  4:07 PM    * Intractable right upper quadrant abdominal pain  Assessment & Plan  With ongoing severe RUQ pain, worst post-prandial with associated nausea. 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 Repeat ERCP in 2017 revealing patent stent   LFTs remain unremarkable  CT on admission intra/extrahepatic biliary ductal dilatation, possible stricture or lesion   MRCP with unchanged intra/extrahepatic biliary ductal dilatation to level of ampulla -- no evidence of stones or other blockage  Appreciate GI consult   For transfer to [facility] to ERCP with Dr Soto   Continue Pepcid, B12, folate, remeron, hydroxyzine   Analgesia prn  Consider formal gynecology evaluation accepting facility given underlying endometriosis and question of this contributing to patient's symptomology   Note, case was discussed curbside with gynecology yesterday by my colleague since gynecology is not available at this [place].  No acute interventions recommended at that time     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HCC)  Assessment & Plan  Continue PTA medication  Continue outpatient hematology, immunology follow-up    Bladder spasm  Assessment & Plan  Reports decreased urine output, having to push to urinate, and bladder spasms  No significant amounts of urine on bladder scan  No hydroureteronephrosis on renal US  Improving  Appreciate urology recs  C/w ditropan   Will need outpatient follow-up to discuss TURBT results and further workup if indicated      Systemic lupus erythematosus, unspecified SLE type, unspecified organ involvement status (HCC)  Assessment & Plan  Continue PTA medication    Crohn's colitis (HCC)  Assessment & Plan  History of  Crohn's  No evidence of acute flare  Continue GI follow-up    Acquired hypothyroidism  Assessment & Plan  Continue levothyroxine    Hypokalemia  Assessment & Plan  K 3.1  Patient refusing oral supplementation, IV potassium chloride ordered  Check mag        Medical Problems       Resolved Problems  Date Reviewed: 2/13/2024            Resolved    Headache 2/12/2024     Resolved by  Teresa Nuñez PA-C        Discharging Physician / Practitioner: Abby Curtis PA-C  PCP: Olaf Rhodes MD  Admission Date:   Admission Orders (From admission, onward)       Ordered        02/09/24 1300  Inpatient Admission  Once            02/07/24 2039  Place in Observation  Once                          Discharge Date: 02/13/24    Consultations During Hospital Stay:  GI  Urology    Procedures Performed:   None    Significant Findings / Test Results:   Ct c/a/p:  Intrahepatic and extrahepatic biliary ductal dilation, similar to prior MRI and greater than expected solely on the basis of a postcholecystectomy state. Repeat MRI may be performed to assess for underlying stricture or obstructing lesion. Alternatively, ERCP/EUS may be performed if clinically indicatedMild superior endplate deformity of the L3 vertebral body, new from 7/18/2023 and favored to be degenerative in etiology. Bilateral nonobstructing renal calculi.   MRCP: Unchanged chronic intra and extrahepatic biliary ductal dilatation to the level of the ampulla, without evidence of common bile duct stone or obstructing mass.   US kidney/bladder: Normal renal ultrasound.     Incidental Findings:   None       Test Results Pending at Discharge (will require follow up):   None     Outpatient Tests Requested:  Pending course at accepting facility, none currently     Complications:  none     Reason for Admission: RUQ pain     Hospital Course:   Celine Roa is a 48 y.o. female patient who originally presented to the hospital on 2/7/2024 due to intractable right upper  "quadrant abdominal pain.  She underwent CT revealing intra and extrahepatic biliary ductal dilatation similar to prior, however may be underlying stricture or obstructing lesion.  GI was consulted in the ER who recommended admission for further evaluation and treatment.  Underwent MRCP revealing unchanged intra and extrahepatic biliary ductal dilatation without evidence of common bile duct stone or obstructing mass.  Fortunately, remained with normal LFTs throughout entire admission.  Patient treated symptomatically and with slow diet advancement without significant improvement.  As such, upon reevaluation by GI, they are now recommending transfer to [facility] to undergo repeat ERCP to rule out potential biliary stricture.  Note, patient also with underlying endometriosis.  Question if this could be contributing to patient's current symptomology, per discussion with accepting physician, plan is to have gynecology evaluate patient as well.  Patient was accepted at [facility] under the care of Dr. Holm for further evaluation and treatment.  Patient discharged in stable condition.      Please see above list of diagnoses and related plan for additional information.     Condition at Discharge: stable    Discharge Day Visit / Exam:   Subjective: Patient seen and examined.  No overnight events reported.  Notes ongoing right upper quadrant pain that is worse postprandially.  Notes some nausea today.  However no dry heaving or vomiting.  Denies chest pain or shortness of breath  Vitals: Blood Pressure: 102/72 (02/13/24 0756)  Pulse: 80 (02/13/24 0756)  Temperature: 98.3 °F (36.8 °C) (02/13/24 0756)  Temp Source: Oral (02/09/24 0629)  Respirations: 16 (02/13/24 0756)  Height: 5' 5.25\" (165.7 cm) (02/07/24 4055)  Weight - Scale: 74.7 kg (164 lb 9.6 oz) (02/13/24 0555)  SpO2: 96 % (02/13/24 0830)  Exam:   Physical Exam  Constitutional:       General: She is not in acute distress.  HENT:      Head: " Normocephalic and atraumatic.   Cardiovascular:      Rate and Rhythm: Normal rate and regular rhythm.   Pulmonary:      Effort: Pulmonary effort is normal. No respiratory distress.      Breath sounds: Normal breath sounds.   Abdominal:      General: Abdomen is flat. There is no distension.      Tenderness: There is abdominal tenderness. There is no guarding.      Comments: Epigastric and right upper quadrant tenderness to light/deep palpation.  No peritoneal signs   Musculoskeletal:      Right lower leg: No edema.      Left lower leg: No edema.   Skin:     General: Skin is warm and dry.   Neurological:      General: No focal deficit present.      Mental Status: She is alert and oriented to person, place, and time.          Discussion with Family: Patient declined call to .     Discharge instructions/Information to patient and family:   See after visit summary for information provided to patient and family.      Provisions for Follow-Up Care:  See after visit summary for information related to follow-up care and any pertinent home health orders.      Mobility at time of Discharge:   Basic Mobility Inpatient Raw Score: 23  JH-HLM Goal: 7: Walk 25 feet or more  JH-HLM Achieved: 7: Walk 25 feet or more  HLM Goal achieved. Continue to encourage appropriate mobility.     Disposition:   Acute Care Hospital Transfer to [facility]    Planned Readmission: none     Discharge Statement:  I spent 35 minutes discharging the patient. This time was spent on the day of discharge. I had direct contact with the patient on the day of discharge. Greater than 50% of the total time was spent examining patient, answering all patient questions, arranging and discussing plan of care with patient as well as directly providing post-discharge instructions.  Additional time then spent on discharge activities.    Discharge Medications:  See after visit summary for reconciled discharge medications provided to patient and/or  family.      **Please Note: This note may have been constructed using a voice recognition system**

## 2024-02-13 NOTE — ASSESSMENT & PLAN NOTE
Continue IV repletion  Check in a.m.  Recent Labs     02/11/24  0545 02/13/24  0552   K 3.4* 3.1*

## 2024-02-13 NOTE — NURSING NOTE
Pt transferred to [facility] [place] [facility]. Pt left at 1350. Report called and given to receiving nurse.

## 2024-02-13 NOTE — ASSESSMENT & PLAN NOTE
With ongoing severe RUQ pain, worst post-prandial with associated nausea. 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 Repeat ERCP in 2017 revealing patent stent   LFTs remain unremarkable  CT on admission intra/extrahepatic biliary ductal dilatation, possible stricture or lesion   MRCP with unchanged intra/extrahepatic biliary ductal dilatation to level of ampulla -- no evidence of stones or other blockage  Appreciate GI consult   For transfer to [facility] to ERCP with Dr Soto   Continue Pepcid, B12, folate, remeron, hydroxyzine   Analgesia prn  Consider formal gynecology evaluation accepting facility given underlying endometriosis and question of this contributing to patient's symptomology   Note, case was discussed curbside with gynecology yesterday by my colleague since gynecology is not available at this campus.  No acute interventions recommended at that time

## 2024-02-13 NOTE — CONSULTS
Please see consult note from Dr. Levine on 2/13/2024 for GI recommendations.  Plan for ERCP tomorrow.  Keep n.p.o. at midnight.

## 2024-02-13 NOTE — ASSESSMENT & PLAN NOTE
Wt Readings from Last 3 Encounters:   02/13/24 74.7 kg (164 lb 9.6 oz)   02/09/24 73.4 kg (161 lb 13.1 oz)   01/31/24 72.6 kg (160 lb 0.9 oz)

## 2024-02-13 NOTE — ASSESSMENT & PLAN NOTE
Reports decreased urinary output and having to push to urinate  Bladder scans within normal limits, no evidence of hydroureteronephrosis on ultrasound  Valuated by urology at [facility] and placed on Ditropan  Will need outpatient urodynamic studies as well as discussion on further medical management

## 2024-02-13 NOTE — NURSING NOTE
Patient received from [facility] with IV in Right upper arm.  IV dated for 2/7/24.  Pt reports IV attempted to be replaced x 6 at previous campus today. Pt refused IV change. IV site clean, dry and intact.  IV flushes well with + blood return. Per Dr Alta harrison to use and continue this IV at this time.

## 2024-02-13 NOTE — TELEPHONE ENCOUNTER
Patient remains admitted at this time. Will continue following for discharge, then contact patient to schedule outpatient follow up in office.

## 2024-02-13 NOTE — ASSESSMENT & PLAN NOTE
She has a history of MS and reported that she has several brain lesions and follows with neurology.   Typically are manifested by muscle weakness, she has also had optic neuritis in the past  Followed by [facility],

## 2024-02-13 NOTE — H&P
[facility]  H&P  Name: Celine Roa 48 y.o. female I MRN: [id number]  Unit/Bed#: [place] [address] -01 I Date of Admission: 2/13/2024   Date of Service: 2/13/2024 I Hospital Day: 0    Assessment and Plan  * Abdominal pain  Assessment & Plan  Patient with history of Crohn's disease she is on Skyrizi as well as intravenous methotrexate which he takes on Thursday via infusion.  Given location of symptoms suspect that this is likely related to biliary source.  She did have stent placed in 2014.  Symptoms present since July.  Transferred for ERCP  Also with history of endometriosis, was reviewed with Dr. Jennings, he does not believe that this is related.  He recommends outpatient follow-up.  Patient will follow-up with Dr. Benito as previously instructed.  MRCP without any evidence of obvious obstruction, she is receiving significant amounts of opioids to control her pain  N.p.o. after midnight for ERCP  Patient requires IV pepcid and IV solumedrol prior to procedures, initiated on 2/11 at [facility]      Bladder spasm  Assessment & Plan  Reports decreased urinary output and having to push to urinate  Bladder scans within normal limits, no evidence of hydroureteronephrosis on ultrasound  Valuated by urology at [facility] and placed on Ditropan  Will need outpatient urodynamic studies as well as discussion on further medical management    GERD without esophagitis  Assessment & Plan  Continue Pepcid    MS (multiple sclerosis) ([facility])  Assessment & Plan  She has a history of MS and reported that she has several brain lesions and follows with neurology.   Typically are manifested by muscle weakness, she has also had optic neuritis in the past  Followed by [facility],    Crohn's colitis ([facility])  Assessment & Plan  History of Crohn's disease on Skyrizi as well as methotrexate  Continue GI follow-up  Is due on Thursday for methotrexate and receives  intravenously  Resume folic  acid    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HCC)  Assessment & Plan  Continue prior to admission medication  Continue outpatient immunology follow-up with [facility]  Patient reports intolerance to all medications with corn.    Acquired hypothyroidism  Assessment & Plan  Prior to admission on Cytomel, resume substitute with levothyroxine    Hypokalemia  Assessment & Plan  Continue IV repletion  Check in a.m.  Recent Labs     02/11/24  0545 02/13/24  0552   K 3.4* 3.1*         CHF (congestive heart failure) (HCC)-resolved as of 2/13/2024  Assessment & Plan  Wt Readings from Last 3 Encounters:   02/13/24 74.7 kg (164 lb 9.6 oz)   02/09/24 73.4 kg (161 lb 13.1 oz)   01/31/24 72.6 kg (160 lb 0.9 oz)                   Code Status: Level 1 - Full Code     VTE Prophylaxis:  Allergy to heparin/Lovenox   / sequential compression device     Discussion with family: Patient updating family    Anticipated Length of Stay:  Patient will be admitted on an Inpatient basis with an anticipated length of stay of greater than 2 midnights.   Justification for Hospital Stay: Abdominal pain ,  ERCP    Total Time for Visit, including Counseling / Coordination of Care: 76 minutes.  Greater than 50% of this total time spent on direct patient counseling and coordination of care.    Chief Complaint:     No chief complaint on file.  Abdominal pain    History of Present Illness:    Celine Roa is a 48 y.o. female who presents with abdominal pain which has been present since July.  She initially presented to the [facility].  She was evaluated in consultation by the GI service.  She underwent MRCP which was unrevealing for stricture.  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  She reports she was doing well after that, but now her pain has become intolerable.  At the time my evaluation she reports it is 9 out of 10.  It is located in the right upper quadrant with no  alleviating or relieving factors.  Also reports having pelvic pain, she has a history of hysterectomy at age 32.  At that time given her age her ovaries were retained.  She reports that she is now perimenopausal, does not longer get her period.  In terms of her abdominal pain she has pelvic fullness as well as right upper quadrant pain.  She reports her right upper quadrant pain is the worst.  She been evaluated by gastroenterology at [facility] with plans for transfer for ERCP.  The patient denies any nausea, she has not had any vomiting or diarrhea.  She has a history of Crohn's disease which is well-controlled not felt to have active flare.    Review of Systems:    A complete and comprehensive 14 point organ system review was performed and all other systems are negative other than stated above in the HPI    Past Medical and Surgical History:     Past Medical History:   Diagnosis Date    Anemia 2007    Anxiety     Asthma     Bile duct stricture 2014    Sphincter of oddi dysfunction    Chronic kidney disease     Colon polyp 1998    Crohn's colitis (HCC)     Deep vein thrombosis (HCC)     Disease of thyroid gland     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 pt denies, documented in cardio note    Pancreatitis     sphinger of     Psychiatric disorder     RA (rheumatoid arthritis) (HCC)     Seizures (HCC)     Ulcerative colitis (HCC)     Visual impairment        Past Surgical History:   Procedure Laterality Date    ABDOMINAL SURGERY  2017    APPENDECTOMY      CHOLECYSTECTOMY      COLONOSCOPY  2019    CYSTOSCOPY N/A 6/8/2023    Procedure: CYSTOSCOPY, mini TURBT with fulgeration;  Surgeon: Tee  "MD Kiana;  Location: [facility];  Service: Urology    EGD AND COLONOSCOPY N/A 09/12/2017    Procedure: EGD AND COLONOSCOPY;  Surgeon: Tommy Oliver MD;  Location: [facility];  Service: Gastroenterology    ERCP N/A 09/15/2017    Procedure: ENDOSCOPIC RETROGRADE CHOLANGIOPANCREATOGRAPHY (ERCP);  Surgeon: Dre Soto MD;  Location: [facility];  Service: Gastroenterology    HYSTERECTOMY      KNEE SURGERY Right     LAPAROSCOPIC ENDOMETRIOSIS FULGURATION      ORIF ANKLE FRACTURE Left     VT ARTHRS KNE SURG W/MENISCECTOMY MED/LAT W/SHVG Left 05/12/2017    Procedure: POSSIBLE MEDIAL MENISECTOMY;  Surgeon: Kristian Avila MD;  Location: [facility];  Service: Orthopedics    VT ARTHRS KNEE DEBRIDEMENT/SHAVING ARTCLR CRTLG Left 05/12/2017    Procedure: KNEE ARTHROSCOPY EVALUATION, CHONDROPLASTY;  Surgeon: Kristian Avila MD;  Location: [facility];  Service: Orthopedics    VT LAPS ABD PRTM&OMENTUM DX W/WO SPEC BR/WA SPX N/A 12/12/2017    Procedure: LAPAROSCOPY DIAGNOSTIC  WITH LYSIS OF ADHESIONS;  Surgeon: Olaf John DO;  Location:  MAIN OR;  Service: General    UPPER GASTROINTESTINAL ENDOSCOPY  2019       Meds/Allergies:    Prior to Admission medications    Medication Sig Start Date End Date Taking? Authorizing Provider   Ascorbic Acid (vitamin C) 1000 MG tablet Take 1,000 mg by mouth 2 (two) times a day    Historical Provider, MD MARIN 3CC LUER-SHAMEKA SYR 25GX1\" 25G X 1\" 3 ML MISC  6/1/22   Historical Provider, MD   butalbital-acetaminophen-caffeine (FIORICET,ESGIC) -40 mg per tablet Take 1 tablet by mouth every 8 (eight) hours as needed for migraine  Patient not taking: Reported on 1/29/2024 6/23/23   Holden Gill DO   cetirizine (ZyrTEC) 10 mg tablet Take 20 mg by mouth daily    Historical Provider, MD   cholecalciferol (VITAMIN D3) 1,000 units tablet Take 1,000 Units by mouth 2 (two) times a day    Historical Provider, MD   Cholecalciferol 10 MCG /0.025ML LIQD Take 1,000 Units by mouth 2 (two) times a day  Patient " not taking: Reported on 1/29/2024 1/20/23   Holden Gill DO   cyanocobalamin (VITAMIN B-12) 100 mcg tablet Take by mouth daily    Historical Provider, MD   diclofenac (VOLTAREN) 75 mg EC tablet  7/14/23   Historical Provider, MD   diphenhydrAMINE (BENADRYL) 50 mg capsule Take 50 mg by mouth 4 (four) times a day as needed for itching Pt states takes 50mg daily, but may go up to 4 times daily prn    Historical Provider, MD   EpiPen 2-Malachi 0.3 MG/0.3ML SOAJ INJECT 1 PEN INTO THE MUSCLE AS NEEDED FOR ANAPHALAXIS  Patient not taking: Reported on 1/29/2024 5/30/23   Historical Provider, MD   famotidine (PEPCID) 40 MG tablet TAKE ONE TABLET BY MOUTH ONCE DAILY 12/29/23   Yehuda Levine MD   folic acid (KP Folic Acid) 1 mg tablet Take 1 tablet (1 mg total) by mouth daily 10/23/23   Yehuda Levine MD   hydrOXYzine HCL (ATARAX) 25 mg tablet Take 15 mg by mouth daily 5/26/23   Historical Provider, MD   Lactobacillus (PROBIOTIC ACIDOPHILUS PO) Take by mouth    Historical Provider, MD   Lactobacillus-Inulin ([facility]) CAPS Take 200 mg by mouth daily    Historical Provider, MD   levalbuterol (XOPENEX HFA) 45 mcg/act inhaler Inhale 1-2 puffs every 4 (four) hours as needed for shortness of breath 5/23/23   Holden Gill DO   levothyroxine 100 mcg tablet Take 75 mcg by mouth every other day    Historical Provider, MD   liothyronine (CYTOMEL) 5 mcg tablet Take 1 tablet (5 mcg total) by mouth daily Do not start before June 14, 2023. 6/14/23   Sera Pink PA-C   magnesium gluconate (MAGONATE) 500 mg tablet Take 500 mg by mouth 2 (two) times a day    Historical Provider, MD   Melatonin 3 MG SUBL 6 mg daily at bedtime 8/15/23   Historical Provider, MD   melatonin 3 mg Take 1 tablet (3 mg total) by mouth daily at bedtime  Patient not taking: Reported on 1/29/2024 8/15/23   Letha Carrero MD   methotrexate 50 MG/2ML injection Inject 1 mL (25 mg total) under the skin once a week  Patient not  "taking: Reported on 1/29/2024 10/23/23   Yehuda Levine MD   methotrexate 50 MG/2ML injection Inject 1 mL (25 mg total) under the skin once a week 11/1/23   Yehuda Levine MD   mirtazapine (REMERON) 15 mg tablet Take 1 tablet (15 mg total) by mouth daily at bedtime 1/23/24   Diogenes Lewis DO   montelukast (SINGULAIR) 10 mg tablet Take 1 tablet (10 mg total) by mouth daily at bedtime 8/15/23   Letha Carrero MD   NEEDLE, DISP, 27 G (B-D DISP NEEDLE 50PB7-5/4\") 27G X 1-1/4\" MISC Use once a week 10/23/23   Yehuda Levine MD   NON FORMULARY immunoglobin sq 6g 30 ml once a week    Historical Provider, MD   nystatin (MYCOSTATIN) 500,000 units/5 mL suspension Swish and spit 5 mL (500,000 Units total) 4 (four) times a day  Patient not taking: Reported on 9/13/2023 3/10/23   Teresa Nuñez PA-C   omalizumab (XOLAIR) 150 mg Inject 2.4 mL (300 mg total) under the skin every 21 days  Patient taking differently: Inject 300 mg under the skin every 28 days 11/27/23   Olaf Barrios MD   pantoprazole (PROTONIX) 40 mg tablet TAKE ONE TABLET BY MOUTH TWICE A DAY  Patient not taking: Reported on 11/27/2023 12/30/22   Sera Rincon PA-C   predniSONE 20 mg tablet Take 40mg per day for 1 week, then 30mg per day for 1 week, then 20mg per day for 1 week and then 10mg per day for 1 week. 1/17/24   Yehuda Levine MD   Riboflavin (VITAMIN B-2 PO) Take by mouth    Historical Provider, MD   Riboflavin (Vitamin B-2) 100 MG TABS Take 100 mg by mouth 2 (two) times a day    Historical Provider, MD   risankizumab-rzaa (Skyrizi) 360 MG/2.4ML SOCT Take first on body injector (OBI) under skin on week 12 then every 8 weeks there after 12/12/23   Yehuda Levine MD   scopolamine (TRANSDERM-SCOP) 1 mg/3 days TD 72 hr patch Place 1 patch on the skin over 72 hours every third day  Patient not taking: Reported on 2/7/2024 2/7/24   MD Theodore Goetz MISC  4/15/22   Historical Provider, MD   traMADol " (Ultram) 50 mg tablet Take 1 tablet (50 mg total) by mouth every 12 (twelve) hours as needed for moderate pain or severe pain  Patient not taking: Reported on 9/14/2023 6/23/23   Holden Gill DO     I have reviewed home medications with patient personally.    Allergies:   Allergies   Allergen Reactions    Aimovig [Erenumab-Aooe] Anaphylaxis    Amitriptyline Anaphylaxis     According to the patient she had nphylaxis    Aspergillus Allergy Skin Test Other (See Comments), Hives and Swelling    Azathioprine Other (See Comments) and Anaphylaxis     pancreatitis  According to patient she needed Epipen    Buspar [Buspirone] Hives and Shortness Of Breath    Citric Acid-Polysorbate 80 Abdominal Pain, Anaphylaxis, Anxiety, Bradycardia, Diarrhea, Fatigue, GI Intolerance, Headache, Hives, Hyperactivity, Itching, Lip Swelling, Nausea Only, Palpitations, Rash, Shortness Of Breath, Tachycardia, Throat Swelling, Tongue Swelling and Vomiting    Corn Dextrin Anaphylaxis     Any corn based products    Epinephrine Anaphylaxis    Erenumab Anaphylaxis     patient sent portal message stating she had anaphylaxis  patient sent portal message stating she had anaphylaxis      Gadolinium Abdominal Pain, Anaphylaxis, Anxiety, Confusion, Delirium, Dizziness, Eye Swelling, Fatigue, GI Intolerance, Headache, Hives, Irritability, Itching, Lip Swelling, Nausea Only, Palpitations, Photosensitivity, Rash, Seizures, Shortness Of Breath, Swelling, Syncope, Throat Swelling, Tongue Swelling, Tremor, Visual Disturbance and Vomiting    Gelatin - Food Allergy Anaphylaxis    Heparin Hives     Painful welts     Lactated Ringers Shortness Of Breath     Pt questions this allergy, pt has received LR multiple times without reaction    Lavender Oil Anaphylaxis     Other reaction(s): anaphalxis    Malto Dextrin [Dextrin] Anaphylaxis    Other Anaphylaxis     Gel caps, maltodextrose, dextrose,grapes, lavender     Penicillium Notatum Shortness Of Breath and  "Swelling    Red Dye - Food Allergy Anaphylaxis and Other (See Comments)     intolerance    Sumatriptan Anaphylaxis     Bradycardia, sob, back pressure, head pain,throat tightness  According to the patient she had anphylaxis    Ustekinumab Anaphylaxis    Contrast [Iodinated Contrast Media] Other (See Comments)     Pt states needs to be premedicated prior to injection    Corn Oil - Food Allergy - Food Allergy Hives    Humira [Adalimumab] Other (See Comments)     \"I develop drug induced lupus\"    Ibuprofen Hives and Throat Swelling    Polyethylene Glycol Diarrhea and Vomiting    Remicade [Infliximab] Other (See Comments)     \"I develop drug induced lupus\"    Sulfa Antibiotics Hives and Other (See Comments)    Sulfate Hives    Sulfites - Food Allergy Hives    Sweet Corn Extract Skin Test - Food Allergy Hives and Itching    Chlorhexidine Itching    Freederm Adhesive Remover [New Skin] Rash    Histamine Hives, Rash and Other (See Comments)     Intolerance      Wound Dressings Rash       Social History:     Marital Status: /Civil Union   Occupation: On disability    Substance Use History:   Social History     Substance and Sexual Activity   Alcohol Use Never     Social History     Tobacco Use   Smoking Status Never   Smokeless Tobacco Never     Social History     Substance and Sexual Activity   Drug Use Never       Family History:    Family History   Problem Relation Age of Onset    Ulcerative colitis Mother     Arthritis Mother     Colon polyps Mother     Heart failure Father     Neuropathy Father     Macular degeneration Father     Diabetes Father     Early death Father     Vision loss Father     Asthma Daughter     Hypothyroidism Daughter     Heart disease Maternal Grandmother     Arthritis Maternal Grandmother     No Known Problems Maternal Grandfather     Arthritis Paternal Grandmother     Macular degeneration Paternal Grandmother     Vision loss Paternal Grandmother     Lymphoma Paternal Grandfather     Cancer " Paternal Grandfather     Early death Paternal Grandfather     Breast cancer Maternal Aunt     Cancer Maternal Aunt     Miscarriages / Stillbirths Maternal Aunt     Heart failure Paternal Aunt     Heart failure Paternal Aunt     Irritable bowel syndrome Paternal Aunt     Breast cancer Paternal Aunt 54    Breast cancer additional onset Paternal Aunt 58    Hypothyroidism Paternal Aunt     Cancer Paternal Aunt     No Known Problems Son     No Known Problems Son     Kidney disease Brother     Depression Paternal Uncle     Early death Paternal Uncle        Physical Exam:     Vitals:   Blood Pressure: 106/70 (02/13/24 1529)  Pulse: 76 (02/13/24 1529)  Temperature: 98.3 °F (36.8 °C) (02/13/24 1529)  Temp Source: Oral (02/13/24 1529)  Respirations: 16 (02/13/24 1529)  SpO2: 96 % (02/13/24 1529)      General: well appearing, no acute distress  HEENT: atraumatic, PERRLA, moist mucosa, normal pharynx, normal tonsils and adenoids, normal tongue, no fluid in sinuses  Neck: Trachea midline, no carotid bruit, no masses  Respiratory: normal chest wall expansion, CTA B, no r/r/w, no rubs  Cardiovascular: RRR, no m/r/g, Normal S1 and S2  Abdomen: Soft, non-tender, non-distended, normal bowel sounds in all quadrants, no hepatosplenomegaly, no tympany  Rectal: deferred  Musculoskeletal: normal ROM in upper and lower extremities  Integumentary: warm, dry, and pink, with no rash, purpura, or petechia  Heme/Lymph: no lymphadenopathy, no bruises  Neurological: Cranial Nerves II-XII grossly intact  Psychiatric: cooperative with normal mood, affect, and cognition    Additional Data:     Lab Results: Laboratory studies reviewed for today    Results from last 7 days   Lab Units 02/11/24  0545   WBC Thousand/uL 7.21   HEMOGLOBIN g/dL 14.1   HEMATOCRIT % 43.7   PLATELETS Thousands/uL 380   NEUTROS PCT % 57   LYMPHS PCT % 27   MONOS PCT % 11   EOS PCT % 4     Results from last 7 days   Lab Units 02/13/24  0552   SODIUM mmol/L 141   POTASSIUM  mmol/L 3.1*   CHLORIDE mmol/L 108   CO2 mmol/L 26   BUN mg/dL 6   CREATININE mg/dL 0.63   ANION GAP mmol/L 7   CALCIUM mg/dL 7.7*   ALBUMIN g/dL 3.3*   TOTAL BILIRUBIN mg/dL 0.29   ALK PHOS U/L 46   ALT U/L 18   AST U/L 11*   GLUCOSE RANDOM mg/dL 102     Results from last 7 days   Lab Units 02/07/24  1746   INR  1.00             Results from last 7 days   Lab Units 02/07/24  1721   LACTIC ACID mmol/L 1.3     Results from last 7 days   Lab Units 02/07/24  1721   HS TNI 0HR ng/L <2       Imaging: I have personally reviewed pertinent reports.      No orders to display       EKG, Pathology, and Other Studies Reviewed on Admission:   Reviewed GI correspondence    Allscripts / Epic Records Reviewed: Yes    ** Please Note: This note was completed in part utilizing [employer] Dragon Medical One Software.  Grammatical errors, random word insertions, spelling mistakes, and incomplete sentences may be an occasional consequence of this system secondary to software limitations, ambient noise, and hardware issues.  If you have any questions or concerns about the content, text, or information contained within the body of this dictation, please contact the provider for clarification.**

## 2024-02-13 NOTE — PLAN OF CARE
Problem: Knowledge Deficit  Goal: Patient/family/caregiver demonstrates understanding of disease process, treatment plan, medications, and discharge instructions  Description: Complete learning assessment and assess knowledge base.  Interventions:  - Provide teaching at level of understanding  - Provide teaching via preferred learning methods  Outcome: Progressing     Problem: PAIN - ADULT  Goal: Verbalizes/displays adequate comfort level or baseline comfort level  Description: Interventions:  - Encourage patient to monitor pain and request assistance  - Assess pain using appropriate pain scale  - Administer analgesics based on type and severity of pain and evaluate response  - Implement non-pharmacological measures as appropriate and evaluate response  - Consider cultural and social influences on pain and pain management  - Notify physician/advanced practitioner if interventions unsuccessful or patient reports new pain  Outcome: Not Progressing

## 2024-02-13 NOTE — ASSESSMENT & PLAN NOTE
Reports decreased urine output, having to push to urinate, and bladder spasms  No significant amounts of urine on bladder scan  No hydroureteronephrosis on renal US  Improving  Appreciate urology recs  C/w ditropan   Will need outpatient follow-up to discuss TURBT results and further workup if indicated

## 2024-02-13 NOTE — ASSESSMENT & PLAN NOTE
History of Crohn's disease on Skyrizi as well as methotrexate  Continue GI follow-up  Is due on Thursday for methotrexate and receives  intravenously  Resume folic acid

## 2024-02-14 ENCOUNTER — ANESTHESIA (INPATIENT)
Dept: GASTROENTEROLOGY | Facility: HOSPITAL | Age: 49
End: 2024-02-14
Payer: COMMERCIAL

## 2024-02-14 ENCOUNTER — ANESTHESIA EVENT (INPATIENT)
Dept: GASTROENTEROLOGY | Facility: HOSPITAL | Age: 49
End: 2024-02-14
Payer: COMMERCIAL

## 2024-02-14 ENCOUNTER — TELEPHONE (OUTPATIENT)
Dept: PSYCHIATRY | Facility: CLINIC | Age: 49
End: 2024-02-14

## 2024-02-14 ENCOUNTER — APPOINTMENT (INPATIENT)
Dept: GASTROENTEROLOGY | Facility: HOSPITAL | Age: 49
DRG: 444 | End: 2024-02-14
Payer: COMMERCIAL

## 2024-02-14 ENCOUNTER — APPOINTMENT (OUTPATIENT)
Dept: RADIOLOGY | Facility: HOSPITAL | Age: 49
DRG: 444 | End: 2024-02-14
Payer: COMMERCIAL

## 2024-02-14 LAB
ALBUMIN SERPL BCP-MCNC: 3.3 G/DL (ref 3.5–5)
ALP SERPL-CCNC: 45 U/L (ref 34–104)
ALT SERPL W P-5'-P-CCNC: 18 U/L (ref 7–52)
ANION GAP SERPL CALCULATED.3IONS-SCNC: 7 MMOL/L
AST SERPL W P-5'-P-CCNC: 12 U/L (ref 13–39)
BILIRUB SERPL-MCNC: 0.21 MG/DL (ref 0.2–1)
BUN SERPL-MCNC: 6 MG/DL (ref 5–25)
CALCIUM ALBUM COR SERPL-MCNC: 8.6 MG/DL (ref 8.3–10.1)
CALCIUM SERPL-MCNC: 8 MG/DL (ref 8.4–10.2)
CHLORIDE SERPL-SCNC: 107 MMOL/L (ref 96–108)
CO2 SERPL-SCNC: 27 MMOL/L (ref 21–32)
CREAT SERPL-MCNC: 0.71 MG/DL (ref 0.6–1.3)
ERYTHROCYTE [DISTWIDTH] IN BLOOD BY AUTOMATED COUNT: 14.6 % (ref 11.6–15.1)
GFR SERPL CREATININE-BSD FRML MDRD: 99 ML/MIN/1.73SQ M
GLUCOSE SERPL-MCNC: 71 MG/DL (ref 65–140)
HCT VFR BLD AUTO: 37.7 % (ref 34.8–46.1)
HGB BLD-MCNC: 12.8 G/DL (ref 11.5–15.4)
MCH RBC QN AUTO: 33.9 PG (ref 26.8–34.3)
MCHC RBC AUTO-ENTMCNC: 34 G/DL (ref 31.4–37.4)
MCV RBC AUTO: 100 FL (ref 82–98)
PLATELET # BLD AUTO: 418 THOUSANDS/UL (ref 149–390)
PMV BLD AUTO: 9.5 FL (ref 8.9–12.7)
POTASSIUM SERPL-SCNC: 3.3 MMOL/L (ref 3.5–5.3)
PROT SERPL-MCNC: 5.5 G/DL (ref 6.4–8.4)
RBC # BLD AUTO: 3.78 MILLION/UL (ref 3.81–5.12)
SODIUM SERPL-SCNC: 141 MMOL/L (ref 135–147)
WBC # BLD AUTO: 14.43 THOUSAND/UL (ref 4.31–10.16)

## 2024-02-14 PROCEDURE — 0F798ZZ DILATION OF COMMON BILE DUCT, VIA NATURAL OR ARTIFICIAL OPENING ENDOSCOPIC: ICD-10-PCS | Performed by: RADIOLOGY

## 2024-02-14 PROCEDURE — 80053 COMPREHEN METABOLIC PANEL: CPT | Performed by: INTERNAL MEDICINE

## 2024-02-14 PROCEDURE — C1769 GUIDE WIRE: HCPCS

## 2024-02-14 PROCEDURE — 74328 X-RAY BILE DUCT ENDOSCOPY: CPT

## 2024-02-14 PROCEDURE — 43264 ERCP REMOVE DUCT CALCULI: CPT | Performed by: INTERNAL MEDICINE

## 2024-02-14 PROCEDURE — 99232 SBSQ HOSP IP/OBS MODERATE 35: CPT | Performed by: PHYSICIAN ASSISTANT

## 2024-02-14 PROCEDURE — 43262 ENDO CHOLANGIOPANCREATOGRAPH: CPT | Performed by: INTERNAL MEDICINE

## 2024-02-14 PROCEDURE — 88305 TISSUE EXAM BY PATHOLOGIST: CPT | Performed by: STUDENT IN AN ORGANIZED HEALTH CARE EDUCATION/TRAINING PROGRAM

## 2024-02-14 PROCEDURE — 85027 COMPLETE CBC AUTOMATED: CPT | Performed by: INTERNAL MEDICINE

## 2024-02-14 PROCEDURE — 43239 EGD BIOPSY SINGLE/MULTIPLE: CPT | Performed by: INTERNAL MEDICINE

## 2024-02-14 RX ORDER — DIPHENHYDRAMINE HYDROCHLORIDE 50 MG/ML
50 INJECTION INTRAMUSCULAR; INTRAVENOUS ONCE
Status: COMPLETED | OUTPATIENT
Start: 2024-02-14 | End: 2024-02-14

## 2024-02-14 RX ORDER — ONDANSETRON 2 MG/ML
INJECTION INTRAMUSCULAR; INTRAVENOUS AS NEEDED
Status: DISCONTINUED | OUTPATIENT
Start: 2024-02-14 | End: 2024-02-14

## 2024-02-14 RX ORDER — METHYLPREDNISOLONE SODIUM SUCCINATE 40 MG/ML
40 INJECTION, POWDER, LYOPHILIZED, FOR SOLUTION INTRAMUSCULAR; INTRAVENOUS ONCE
Status: DISCONTINUED | OUTPATIENT
Start: 2024-02-14 | End: 2024-02-14

## 2024-02-14 RX ORDER — HYDROMORPHONE HCL/PF 1 MG/ML
0.5 SYRINGE (ML) INJECTION EVERY 4 HOURS PRN
Status: DISCONTINUED | OUTPATIENT
Start: 2024-02-14 | End: 2024-02-15

## 2024-02-14 RX ORDER — SODIUM CHLORIDE 9 MG/ML
75 INJECTION, SOLUTION INTRAVENOUS CONTINUOUS
Status: DISCONTINUED | OUTPATIENT
Start: 2024-02-14 | End: 2024-02-16

## 2024-02-14 RX ORDER — DEXAMETHASONE SODIUM PHOSPHATE 10 MG/ML
INJECTION, SOLUTION INTRAMUSCULAR; INTRAVENOUS AS NEEDED
Status: DISCONTINUED | OUTPATIENT
Start: 2024-02-14 | End: 2024-02-14

## 2024-02-14 RX ORDER — POTASSIUM CHLORIDE 20 MEQ/1
40 TABLET, EXTENDED RELEASE ORAL ONCE
Status: DISCONTINUED | OUTPATIENT
Start: 2024-02-14 | End: 2024-02-16

## 2024-02-14 RX ORDER — ROCURONIUM BROMIDE 10 MG/ML
INJECTION, SOLUTION INTRAVENOUS AS NEEDED
Status: DISCONTINUED | OUTPATIENT
Start: 2024-02-14 | End: 2024-02-14

## 2024-02-14 RX ORDER — PROPOFOL 10 MG/ML
INJECTION, EMULSION INTRAVENOUS AS NEEDED
Status: DISCONTINUED | OUTPATIENT
Start: 2024-02-14 | End: 2024-02-14

## 2024-02-14 RX ORDER — LIDOCAINE HYDROCHLORIDE 20 MG/ML
INJECTION, SOLUTION EPIDURAL; INFILTRATION; INTRACAUDAL; PERINEURAL AS NEEDED
Status: DISCONTINUED | OUTPATIENT
Start: 2024-02-14 | End: 2024-02-14

## 2024-02-14 RX ORDER — METHYLPREDNISOLONE SODIUM SUCCINATE 40 MG/ML
40 INJECTION, POWDER, LYOPHILIZED, FOR SOLUTION INTRAMUSCULAR; INTRAVENOUS ONCE
Status: COMPLETED | OUTPATIENT
Start: 2024-02-14 | End: 2024-02-14

## 2024-02-14 RX ORDER — FAMOTIDINE 10 MG/ML
20 INJECTION, SOLUTION INTRAVENOUS ONCE
Status: COMPLETED | OUTPATIENT
Start: 2024-02-14 | End: 2024-02-14

## 2024-02-14 RX ORDER — SODIUM CHLORIDE AND POTASSIUM CHLORIDE 300; 900 MG/100ML; MG/100ML
100 INJECTION, SOLUTION INTRAVENOUS CONTINUOUS
Status: DISPENSED | OUTPATIENT
Start: 2024-02-14 | End: 2024-02-15

## 2024-02-14 RX ORDER — DIPHENHYDRAMINE HYDROCHLORIDE 50 MG/ML
50 INJECTION INTRAMUSCULAR; INTRAVENOUS ONCE
Status: CANCELLED | OUTPATIENT
Start: 2024-02-14

## 2024-02-14 RX ORDER — FENTANYL CITRATE 50 UG/ML
INJECTION, SOLUTION INTRAMUSCULAR; INTRAVENOUS AS NEEDED
Status: DISCONTINUED | OUTPATIENT
Start: 2024-02-14 | End: 2024-02-14

## 2024-02-14 RX ADMIN — FAMOTIDINE 20 MG: 10 INJECTION INTRAVENOUS at 08:14

## 2024-02-14 RX ADMIN — ONDANSETRON 4 MG: 2 INJECTION INTRAMUSCULAR; INTRAVENOUS at 15:28

## 2024-02-14 RX ADMIN — SODIUM CHLORIDE: 0.9 INJECTION, SOLUTION INTRAVENOUS at 14:55

## 2024-02-14 RX ADMIN — DIPHENHYDRAMINE HYDROCHLORIDE 50 MG: 50 INJECTION, SOLUTION INTRAMUSCULAR; INTRAVENOUS at 05:44

## 2024-02-14 RX ADMIN — FAMOTIDINE 20 MG: 10 INJECTION INTRAVENOUS at 15:10

## 2024-02-14 RX ADMIN — LEVOTHYROXINE SODIUM 75 MCG: 75 TABLET ORAL at 05:48

## 2024-02-14 RX ADMIN — OXYBUTYNIN CHLORIDE 5 MG: 5 TABLET ORAL at 21:13

## 2024-02-14 RX ADMIN — ROCURONIUM BROMIDE 5 MG: 10 INJECTION, SOLUTION INTRAVENOUS at 15:23

## 2024-02-14 RX ADMIN — IOHEXOL 12 ML: 300 INJECTION, SOLUTION INTRAVENOUS at 17:00

## 2024-02-14 RX ADMIN — DIPHENHYDRAMINE HYDROCHLORIDE 50 MG: 50 INJECTION, SOLUTION INTRAMUSCULAR; INTRAVENOUS at 15:01

## 2024-02-14 RX ADMIN — SODIUM CHLORIDE: 0.9 INJECTION, SOLUTION INTRAVENOUS at 16:31

## 2024-02-14 RX ADMIN — METHYLPREDNISOLONE SODIUM SUCCINATE 40 MG: 40 INJECTION, POWDER, FOR SOLUTION INTRAMUSCULAR; INTRAVENOUS at 08:14

## 2024-02-14 RX ADMIN — HYDROMORPHONE HYDROCHLORIDE 0.5 MG: 0.5 INJECTION, SOLUTION INTRAMUSCULAR; INTRAVENOUS; SUBCUTANEOUS at 09:43

## 2024-02-14 RX ADMIN — HYDROMORPHONE HYDROCHLORIDE 0.5 MG: 0.5 INJECTION, SOLUTION INTRAMUSCULAR; INTRAVENOUS; SUBCUTANEOUS at 22:43

## 2024-02-14 RX ADMIN — FAMOTIDINE 20 MG: 10 INJECTION INTRAVENOUS at 21:13

## 2024-02-14 RX ADMIN — DIPHENHYDRAMINE HYDROCHLORIDE 50 MG: 50 INJECTION, SOLUTION INTRAMUSCULAR; INTRAVENOUS at 09:43

## 2024-02-14 RX ADMIN — DIPHENHYDRAMINE HYDROCHLORIDE 50 MG: 50 INJECTION, SOLUTION INTRAMUSCULAR; INTRAVENOUS at 01:27

## 2024-02-14 RX ADMIN — HYDROMORPHONE HYDROCHLORIDE 0.5 MG: 0.5 INJECTION, SOLUTION INTRAMUSCULAR; INTRAVENOUS; SUBCUTANEOUS at 18:22

## 2024-02-14 RX ADMIN — HYDROMORPHONE HYDROCHLORIDE 0.5 MG: 1 INJECTION, SOLUTION INTRAMUSCULAR; INTRAVENOUS; SUBCUTANEOUS at 10:27

## 2024-02-14 RX ADMIN — SODIUM CHLORIDE 75 ML/HR: 0.9 INJECTION, SOLUTION INTRAVENOUS at 04:38

## 2024-02-14 RX ADMIN — SUGAMMADEX 200 MG: 100 INJECTION, SOLUTION INTRAVENOUS at 16:40

## 2024-02-14 RX ADMIN — HYDROMORPHONE HYDROCHLORIDE 0.5 MG: 1 INJECTION, SOLUTION INTRAMUSCULAR; INTRAVENOUS; SUBCUTANEOUS at 05:44

## 2024-02-14 RX ADMIN — FENTANYL CITRATE 50 MCG: 50 INJECTION INTRAMUSCULAR; INTRAVENOUS at 15:30

## 2024-02-14 RX ADMIN — DIPHENHYDRAMINE HYDROCHLORIDE 50 MG: 50 INJECTION, SOLUTION INTRAMUSCULAR; INTRAVENOUS at 17:35

## 2024-02-14 RX ADMIN — DEXAMETHASONE SODIUM PHOSPHATE 10 MG: 10 INJECTION INTRAMUSCULAR; INTRAVENOUS at 15:28

## 2024-02-14 RX ADMIN — HYDROMORPHONE HYDROCHLORIDE 0.5 MG: 1 INJECTION, SOLUTION INTRAMUSCULAR; INTRAVENOUS; SUBCUTANEOUS at 17:35

## 2024-02-14 RX ADMIN — DIPHENHYDRAMINE HYDROCHLORIDE 50 MG: 50 INJECTION, SOLUTION INTRAMUSCULAR; INTRAVENOUS at 21:38

## 2024-02-14 RX ADMIN — MIRTAZAPINE 15 MG: 15 TABLET, FILM COATED ORAL at 21:13

## 2024-02-14 RX ADMIN — MONTELUKAST 10 MG: 10 TABLET, FILM COATED ORAL at 21:13

## 2024-02-14 RX ADMIN — LIOTHYRONINE SODIUM 5 MCG: 5 TABLET ORAL at 05:48

## 2024-02-14 RX ADMIN — LIDOCAINE HYDROCHLORIDE 100 MG: 20 INJECTION, SOLUTION EPIDURAL; INFILTRATION; INTRACAUDAL at 15:23

## 2024-02-14 RX ADMIN — METHYLPREDNISOLONE SODIUM SUCCINATE 40 MG: 40 INJECTION, POWDER, FOR SOLUTION INTRAMUSCULAR; INTRAVENOUS at 15:05

## 2024-02-14 RX ADMIN — HYDROMORPHONE HYDROCHLORIDE 0.5 MG: 1 INJECTION, SOLUTION INTRAMUSCULAR; INTRAVENOUS; SUBCUTANEOUS at 01:27

## 2024-02-14 RX ADMIN — HYDROMORPHONE HYDROCHLORIDE 0.5 MG: 1 INJECTION, SOLUTION INTRAMUSCULAR; INTRAVENOUS; SUBCUTANEOUS at 21:38

## 2024-02-14 RX ADMIN — ROCURONIUM BROMIDE 15 MG: 10 INJECTION, SOLUTION INTRAVENOUS at 15:33

## 2024-02-14 RX ADMIN — FENTANYL CITRATE 50 MCG: 50 INJECTION INTRAMUSCULAR; INTRAVENOUS at 16:09

## 2024-02-14 RX ADMIN — PROPOFOL 200 MG: 10 INJECTION, EMULSION INTRAVENOUS at 15:23

## 2024-02-14 RX ADMIN — POTASSIUM CHLORIDE AND SODIUM CHLORIDE 100 ML/HR: 900; 300 INJECTION, SOLUTION INTRAVENOUS at 13:04

## 2024-02-14 NOTE — ASSESSMENT & PLAN NOTE
Continue repletion  Monitor daily  Recent Labs     02/13/24  0552 02/14/24  0452   K 3.1* 3.3*

## 2024-02-14 NOTE — TELEPHONE ENCOUNTER
The patient LVM at the office, requesting to cancel her appt due to her being in the hospital. The writer LVM informing the pt that her appt was Cx. The writer advised the patient to contact the office if she has any questions or concerns.

## 2024-02-14 NOTE — PROGRESS NOTES
[facility]  Progress Note  Name: Celine Roa I  MRN: [id number]  Unit/Bed#: [place] [address] -01 I Date of Admission: 2/13/2024   Date of Service: 2/14/2024 I Hospital Day: 1    Assessment/Plan   * Abdominal pain  Assessment & Plan  Patient with history of Crohn's disease she is on Skyrizi as well as intravenous methotrexate which he takes on Thursday via infusion.  Given location of symptoms suspect that this is likely related to biliary source.  She did have stent placed in 2014.  Symptoms present since July.  Transferred for ERCP, scheduled today at 1 PM  Also with history of endometriosis, was reviewed with Dr. Jennings, he does not believe that this is related.  He recommends outpatient follow-up.  Patient will follow-up with Dr. Benito as previously instructed.  MRCP without any evidence of obvious obstruction, she is receiving significant amounts of opioids to control her pain  Has been n.p.o. since midnight for ERCP today  Patient requires IV pepcid and IV solumedrol prior to procedures, initiated on 2/11 at [facility]  Continue IV fluid hydration while n.p.o.      Bladder spasm  Assessment & Plan  Reports decreased urinary output and having to push to urinate  Bladder scans within normal limits, no evidence of hydroureteronephrosis on ultrasound  Valuated [facility] at [facility] and placed on Ditropan  Will need outpatient urodynamic studies as well as discussion on further medical management    GERD without esophagitis  Assessment & Plan  Continue Pepcid    Crohn's colitis (HCC)  Assessment & Plan  History of Crohn's disease on Skyrizi as well as methotrexate  Continue GI follow-up  Is due on Thursday for methotrexate and receives  intravenously  Resume folic acid    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HCC)  Assessment & Plan  Continue prior to admission medication  Continue outpatient immunology follow-up with [facility]  Patient reports intolerance to all  medications with corn.  Continue premedication with Solu-Medrol and Pepcid    Acquired hypothyroidism  Assessment & Plan  Prior to admission on Cytomel, resume substitute with levothyroxine    Hypokalemia  Assessment & Plan  Continue repletion  Monitor daily  Recent Labs     24  0552 24  0452   K 3.1* 3.3*                  VTE Pharmacologic Prophylaxis:   Low Risk (Score 0-2) - Encourage Ambulation.    Mobility:   Basic Mobility Inpatient Raw Score: 23  JH-HLM Goal: 7: Walk 25 feet or more  JH-HLM Achieved: 1: Laying in bed  HLM Goal NOT achieved. Continue with multidisciplinary rounding and encourage appropriate mobility to improve upon HLM goals.    Patient Centered Rounds: I performed bedside rounds with nursing staff today.   Discussions with Specialists or Other Care Team Provider: none    Total Time Spent on Date of Encounter in care of patient:  This time was spent on one or more of the following: performing physical exam; counseling and coordination of care; obtaining or reviewing history; documenting in the medical record; reviewing/ordering tests, medications or procedures; communicating with other healthcare professionals and discussing with patient's family/caregivers.    Current Length of Stay: 1 day(s)  Current Patient Status: Inpatient   Certification Statement: The patient will continue to require additional inpatient hospital stay due to abdominal pain pending ERCP  Discharge Plan: Anticipate discharge in 48 hrs to home.    Code Status: Level 1 - Full Code    Subjective:   Patient seen and examined at bedside.  Notes she was unable to sleep much overnight as her IV was intermittently beeping.  Continues to have abdominal pain.  Agreeable for procedure this afternoon.    Objective:     Vitals:   Temp (24hrs), Av.3 °F (36.8 °C), Min:98.1 °F (36.7 °C), Max:98.4 °F (36.9 °C)    Temp:  [98.1 °F (36.7 °C)-98.4 °F (36.9 °C)] 98.1 °F (36.7 °C)  HR:  [71-76] 71  Resp:  [16-19] 19  BP:  (106-109)/(70-71) 107/70  SpO2:  [94 %-96 %] 94 %  Body mass index is 26.99 kg/m².     Input and Output Summary (last 24 hours):     Intake/Output Summary (Last 24 hours) at 2/14/2024 0901  Last data filed at 2/14/2024 0645  Gross per 24 hour   Intake --   Output 1800 ml   Net -1800 ml       Physical Exam:   Physical Exam  Vitals reviewed.   Constitutional:       General: She is not in acute distress.  HENT:      Head: Normocephalic and atraumatic.   Eyes:      General: No scleral icterus.     Conjunctiva/sclera: Conjunctivae normal.   Cardiovascular:      Rate and Rhythm: Normal rate and regular rhythm.      Heart sounds: No murmur heard.  Pulmonary:      Effort: Pulmonary effort is normal. No respiratory distress.      Breath sounds: Normal breath sounds. No wheezing.   Abdominal:      General: Bowel sounds are normal. There is no distension.      Palpations: Abdomen is soft.      Tenderness: There is no abdominal tenderness.   Musculoskeletal:      Cervical back: Neck supple.      Right lower leg: No edema.      Left lower leg: No edema.   Skin:     General: Skin is warm and dry.   Neurological:      Mental Status: She is alert and oriented to person, place, and time.   Psychiatric:         Mood and Affect: Mood normal.         Behavior: Behavior normal.          Additional Data:     Labs:  Results from last 7 days   Lab Units 02/14/24  0452 02/11/24  0545   WBC Thousand/uL 14.43* 7.21   HEMOGLOBIN g/dL 12.8 14.1   HEMATOCRIT % 37.7 43.7   PLATELETS Thousands/uL 418* 380   NEUTROS PCT %  --  57   LYMPHS PCT %  --  27   MONOS PCT %  --  11   EOS PCT %  --  4     Results from last 7 days   Lab Units 02/14/24  0452   SODIUM mmol/L 141   POTASSIUM mmol/L 3.3*   CHLORIDE mmol/L 107   CO2 mmol/L 27   BUN mg/dL 6   CREATININE mg/dL 0.71   ANION GAP mmol/L 7   CALCIUM mg/dL 8.0*   ALBUMIN g/dL 3.3*   TOTAL BILIRUBIN mg/dL 0.21   ALK PHOS U/L 45   ALT U/L 18   AST U/L 12*   GLUCOSE RANDOM mg/dL 71     Results from last 7  days   Lab Units 02/07/24  1746   INR  1.00             Results from last 7 days   Lab Units 02/07/24  1721   LACTIC ACID mmol/L 1.3       Lines/Drains:  Invasive Devices       Peripheral Intravenous Line  Duration             Peripheral IV 02/07/24 Upper;Ventral (anterior);Left Arm 6 days              Line  Duration             Pump Device  -- days                          Imaging: No pertinent imaging reviewed.    Recent Cultures (last 7 days):         Last 24 Hours Medication List:   Current Facility-Administered Medications   Medication Dose Route Frequency Provider Last Rate    butalbital-acetaminophen-caffeine  1 tablet Oral Q4H PRN Miles Holm, DO      diphenhydrAMINE  50 mg Intravenous Q4H PRN Miles Holm, DO      famotidine  20 mg Intravenous Q12H KARISSA Miles Holm, DO      folic acid  1 mg Oral Daily Miles Holm, DO      HYDROmorphone  0.5 mg Intravenous Q4H PRN Elizabeth Bose PA-C      HYDROmorphone  0.5 mg Intravenous Q4H PRN Merry Rodriguez PA-C      hydrOXYzine HCL  15 mg Oral Daily Miles Holm, DO      levothyroxine  75 mcg Oral Every Other Day Miles Holm, DO      liothyronine  5 mcg Oral Daily Miles Holm, DO      loratadine  10 mg Oral Daily Miles Holm, DO      methylPREDNISolone sodium succinate  40 mg Intravenous Daily Miles Holm, DO      mirtazapine  15 mg Oral HS Miles Holm, DO      montelukast  10 mg Oral HS Miles Holm, DO      ondansetron  4 mg Intravenous Q6H PRN Miles Holm, DO      oxybutynin  5 mg Oral BID PRN Miles Holm, DO      potassium chloride  40 mEq Oral Once Ruben G Chirayath, DO      sodium chloride  75 mL/hr Intravenous Continuous Miles Holm, DO 75 mL/hr (02/14/24 9589)        Today, Patient Was Seen By: Merry Rodriguez PA-C    **Please Note: This note may have been constructed using a voice recognition system.**

## 2024-02-14 NOTE — QUICK NOTE
Patient seen and examined.  Plan for ERCP today given intra and extrahepatic ductal dilation noted on MRI.  Patient consented for procedure.  Further recommendation based on findings.

## 2024-02-14 NOTE — ANESTHESIA POSTPROCEDURE EVALUATION
Post-Op Assessment Note    CV Status:  Stable    Pain management: adequate       Mental Status:  Alert and awake   Hydration Status:  Euvolemic   PONV Controlled:  Controlled   Airway Patency:  Patent     Post Op Vitals Reviewed: Yes    No anethesia notable event occurred.    Staff: Anesthesiologist, CRNA               /78 (02/14/24 1703)    Temp      Pulse 66 (02/14/24 1703)   Resp 16 (02/14/24 1703)    SpO2 98 % (02/14/24 1703)    /78   Pulse 66   Temp 97.8 °F (36.6 °C) (Temporal)   Resp 16   Wt 74.1 kg (163 lb 5.8 oz)   SpO2 98%   BMI 26.99 kg/m²

## 2024-02-14 NOTE — ASSESSMENT & PLAN NOTE
Patient with history of Crohn's disease she is on Skyrizi as well as intravenous methotrexate which he takes on Thursday via infusion.  Given location of symptoms suspect that this is likely related to biliary source.  She did have stent placed in 2014.  Symptoms present since July.  Transferred for ERCP, scheduled today at 1 PM  Also with history of endometriosis, was reviewed with Dr. Jennings, he does not believe that this is related.  He recommends outpatient follow-up.  Patient will follow-up with Dr. Benito as previously instructed.  MRCP without any evidence of obvious obstruction, she is receiving significant amounts of opioids to control her pain  Has been n.p.o. since midnight for ERCP today  Patient requires IV pepcid and IV solumedrol prior to procedures, initiated on 2/11 at [facility]  Continue IV fluid hydration while n.p.o.

## 2024-02-14 NOTE — PLAN OF CARE
Problem: PAIN - ADULT  Goal: Verbalizes/displays adequate comfort level or baseline comfort level  Description: Interventions:  - Encourage patient to monitor pain and request assistance  - Assess pain using appropriate pain scale  - Administer analgesics based on type and severity of pain and evaluate response  - Implement non-pharmacological measures as appropriate and evaluate response  - Consider cultural and social influences on pain and pain management  - Notify physician/advanced practitioner if interventions unsuccessful or patient reports new pain  Outcome: Progressing     Problem: Knowledge Deficit  Goal: Patient/family/caregiver demonstrates understanding of disease process, treatment plan, medications, and discharge instructions  Description: Complete learning assessment and assess knowledge base.  Interventions:  - Provide teaching at level of understanding  - Provide teaching via preferred learning methods  Outcome: Progressing     Problem: GASTROINTESTINAL - ADULT  Goal: Minimal or absence of nausea and/or vomiting  Description: INTERVENTIONS:  - Administer IV fluids if ordered to ensure adequate hydration  - Maintain NPO status until nausea and vomiting are resolved  - Nasogastric tube if ordered  - Administer ordered antiemetic medications as needed  - Provide nonpharmacologic comfort measures as appropriate  - Advance diet as tolerated, if ordered  - Consider nutrition services referral to assist patient with adequate nutrition and appropriate food choices  Outcome: Progressing  Goal: Maintains or returns to baseline bowel function  Description: INTERVENTIONS:  - Assess bowel function  - Encourage oral fluids to ensure adequate hydration  - Administer IV fluids if ordered to ensure adequate hydration  - Administer ordered medications as needed  - Encourage mobilization and activity  - Consider nutritional services referral to assist patient with adequate nutrition and appropriate food  choices  Outcome: Progressing  Goal: Maintains adequate nutritional intake  Description: INTERVENTIONS:  - Monitor percentage of each meal consumed  - Identify factors contributing to decreased intake, treat as appropriate  - Assist with meals as needed  - Monitor I&O, weight, and lab values if indicated  - Obtain nutrition services referral as needed  Outcome: Progressing

## 2024-02-14 NOTE — ASSESSMENT & PLAN NOTE
Continue prior to admission medication  Continue outpatient immunology follow-up with [facility]  Patient reports intolerance to all medications with corn.  Continue premedication with Solu-Medrol and Pepcid

## 2024-02-14 NOTE — UTILIZATION REVIEW
Initial Clinical Review    Admission: Date/Time/Statement:   Admission Orders (From admission, onward)       Ordered        02/13/24 1513  Inpatient Admission  Once                          Orders Placed This Encounter   Procedures    Inpatient Admission     Standing Status:   Standing     Number of Occurrences:   1     Order Specific Question:   Level of Care     Answer:   Med Surg [16]     Order Specific Question:   Bed Type     Answer:   Ankit [4]     Order Specific Question:   Estimated length of stay     Answer:   More than 2 Midnights     Order Specific Question:   Certification     Answer:   I certify that inpatient services are medically necessary for this patient for a duration of greater than two midnights. See H&P and MD Progress Notes for additional information about the patient's course of treatment.     Initial Presentation: 48 y.o. female presents as a transfer from [facility] to [facility] for ongoing care of intolerable abd pain since July.  On exam pain rated 9/10 in RUQ and has pelvic pain and fullness.  Had MRCP at [facility] and will have ERCP here.  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 She is admitted to INPATIENT status with Abd pain, bladder spasm, hypokalemia  - IV Pepcid and IV SOluMedrol pre-procedure, started on 2/11, continue Ditropan, Pepcid, Methotrexate on 2/15, resume folic acid, Levothyroxine, continue IV K repletion for K 3.3 today and trend. Endometriosis will be follow by GYN on OP basis.     Date: 2/14   Day 2:   Plan ERCP today given intra and extrahepatic ductal dilation noted on MRI. IV fluids and pre-procedure prep in process.  NPO.  On exam today c/o Abd pain.  Pt has elevated WBC today. Using IV Dilaudid for analgesia and IV Benadryl.  ERCP completed - results below.      DateL 2/15  Day 3:   Pt with increased leukocytosis, meets  sepsis criteria. Suspected acute abd infection. This AM pt is febrile to 101.6F and tachycardic.  Using IV Dilaudid, Benadryl and Zofran PRN.      ED Triage Vitals   Temperature Pulse Respirations Blood Pressure SpO2   02/13/24 1529 02/13/24 1529 02/13/24 1529 02/13/24 1529 02/13/24 1529   98.3 °F (36.8 °C) 76 16 106/70 96 %      Temp Source Heart Rate Source Patient Position - Orthostatic VS BP Location FiO2 (%)   02/13/24 1529 02/13/24 2133 02/13/24 1529 02/13/24 1529 --   Oral Monitor Lying Right arm       Pain Score       02/13/24 1529       9          Wt Readings from Last 1 Encounters:   02/15/24 74.3 kg (163 lb 12.8 oz)     Additional Vital Signs:   Date/Time Temp Pulse Resp BP MAP (mmHg) SpO2 O2 Device Patient Position - Orthostatic VS   02/15/24 0748 101.6 °F (38.7 °C) Abnormal  -- -- -- -- -- -- --   02/15/24 07:44:36 -- 113 Abnormal  22 114/70 85 98 % -- --   02/15/24 07:43:43 -- 110 Abnormal  22 114/70 85 95 % -- --   02/14/24 21:53:15 97.8 °F (36.6 °C) 93 20 124/80 95 95 % None (Room air) Lying   02/14/24 2115 -- -- -- -- -- 95 % None (Room air) --   02/14/24 1703 -- 66 16 122/78 -- 98 % None (Room air) --   02/14/24 1648 -- 75 17 133/93 -- 99 % None (Room air) --   02/14/24 1409 97.8 °F (36.6 °C) 69 16 108/70 -- 93 % None (Room air) --     02/14/24 08:23:44 98.1 °F (36.7 °C) 71 -- 107/70 82 94 % -- --   02/13/24 21:33:58 98.4 °F (36.9 °C) 73 19 109/71 84 96 % None (Room air) Lying   02/13/24 21:29:10 -- -- -- 109/71 84 -- -- --   02/13/24 2100 -- -- -- -- -- 95 % None (Room air) --       Pertinent Labs/Diagnostic Test Results:       FL ERCP biliary only     IMPRESSION:  Mild abnormal mucosa with erosion in the antrum. Otherwise normal. Biopsies done.   Evidence of prior sphincterotomy at the ampulla. Prominent minor papilla. The sphincterotomy was extended. Balloon sweeps were done and slight debris was removed. The balloon (12mm) passed easily across the ampulla.  Occlusion cholangiogram was then  obtained and no residual filling defect was seen. There was good bile drainage seen.       RECOMMENDATION:    Await pathology results     Follow up with me in clinic      Follow LFT's.            Results from last 7 days   Lab Units 02/15/24  0612 02/14/24  0452 02/11/24  0545 02/09/24  0515   WBC Thousand/uL 19.86* 14.43* 7.21 6.28   HEMOGLOBIN g/dL 13.8 12.8 14.1 13.9   HEMATOCRIT % 41.0 37.7 43.7 42.9   PLATELETS Thousands/uL 395* 418* 380 374   NEUTROS ABS Thousands/µL  --   --  4.13 3.19         Results from last 7 days   Lab Units 02/15/24  0612 02/14/24  0452 02/13/24  0552 02/11/24  0545 02/09/24  0515   SODIUM mmol/L 137 141 141 138 137   POTASSIUM mmol/L 3.6 3.3* 3.1* 3.4* 4.0   CHLORIDE mmol/L 104 107 108 107 105   CO2 mmol/L 22 27 26 23 25   ANION GAP mmol/L 11 7 7 8 7   BUN mg/dL 7 6 6 2* 7   CREATININE mg/dL 0.76 0.71 0.63 0.63 0.68   EGFR ml/min/1.73sq m 91 99 104 104 102   CALCIUM mg/dL 8.0* 8.0* 7.7* 7.6* 7.9*   MAGNESIUM mg/dL  --   --  1.9  --   --      Results from last 7 days   Lab Units 02/14/24 0452 02/13/24 0552 02/11/24  0545   AST U/L 12* 11* 17   ALT U/L 18 18 20   ALK PHOS U/L 45 46 46   TOTAL PROTEIN g/dL 5.5* 5.3* 5.8*   ALBUMIN g/dL 3.3* 3.3* 3.6   TOTAL BILIRUBIN mg/dL 0.21 0.29 0.44         Results from last 7 days   Lab Units 02/15/24  0612 02/14/24  0452 02/13/24  0552 02/11/24  0545 02/09/24  0515   GLUCOSE RANDOM mg/dL 104 71 102 96 71     Results from last 7 days   Lab Units 02/10/24  1351   CLARITY UA  Clear   COLOR UA  Yellow   SPEC GRAV UA  >=1.030   PH UA  5.5   GLUCOSE UA mg/dl >=1000 (1%)*   KETONES UA mg/dl 40 (2+)*   BLOOD UA  Trace-Intact*   PROTEIN UA mg/dl Negative   NITRITE UA  Negative   BILIRUBIN UA  Small*   UROBILINOGEN UA E.U./dl 0.2   LEUKOCYTES UA  Elevated glucose may cause decreased leukocyte values. 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 pt denies, documented in cardio note    Pancreatitis     sphinger of     Psychiatric disorder     RA (rheumatoid arthritis) (HCC)     Seizures (HCC)     Ulcerative colitis (HCC)     Visual impairment      Present on Admission:   Abdominal pain   Acquired hypothyroidism   (Resolved) CHF (congestive heart failure) (HCC)   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HCC)   Crohn's colitis (HCC)   Hypokalemia   GERD without esophagitis   Bladder spasm      Admitting Diagnosis: Intractable right upper quadrant abdominal pain [R10.11]  Age/Sex: 48 y.o. female  Admission Orders:  Scheduled Medications:  famotidine, 40 mg, Intravenous, Q12H KARISSA  folic acid, 1 mg, Oral, Daily  HYDROmorphone, 0.5 mg, Intravenous, Once  hydrOXYzine HCL, 15 mg, Oral, Daily  levothyroxine, 75 mcg, Oral, Every Other Day  liothyronine, 5 mcg, Oral, Daily  loratadine, 10 mg, Oral, Daily  methylPREDNISolone sodium succinate, 40 mg, Intravenous, Daily  mirtazapine, 15 mg, Oral, HS  montelukast, 10 mg, Oral, HS  piperacillin-tazobactam, 3.375 g, Intravenous, Q6H  potassium chloride, 40 mEq, Oral, Once  scopolamine, 1 patch, Transdermal, Q72H      Continuous IV Infusions:  sodium chloride, 125 mL/hr, Intravenous, Continuous      PRN Meds:  butalbital-acetaminophen-caffeine, 1 tablet, Oral, Q4H PRN  diphenhydrAMINE, 50 mg,  Intravenous, Q4H PRN - x 2 2/13, x 5 2/14, x 2 2/15  HYDROmorphone, 0.5 mg, Intravenous, Q4H PRN x 1 2/13, x 8 2/14, x 3 2/15  ondansetron, 4 mg, Intravenous, Q6H PRN - x 1 2/15  oxybutynin, 5 mg, Oral, BID PRN - x 1 2/14    Daily wt  OOB  ERCP  NPO  IP CONSULT TO GASTROENTEROLOGY    Network Utilization Review Department  ATTENTION: Please call with any questions or concerns to [phone] and carefully listen to the prompts so that you are directed to the right person. All voicemails are confidential.   For Discharge needs, contact Care Management DC Support Team at [phone] opt. 2  Send all requests for admission clinical reviews, approved or denied determinations and any other requests to dedicated fax number below belonging to the campus where the patient is receiving treatment. List of dedicated fax numbers for the Facilities:  FACILITY NAME UR FAX NUMBER   ADMISSION DENIALS (Administrative/Medical Necessity) [phone]   DISCHARGE SUPPORT TEAM (NETWORK) [phone]   PARENT CHILD HEALTH (Maternity/NICU/Pediatrics) [phone]   [facility] [phone]   [facility] [phone]   [facility] [phone]   [facility] [phone]   [facility] [phone]   [facility] [phone]   [facility] [phone]   [facility] [phone]   [facility] [phone]   [facility] [phone]   [facility] [phone]   [facility] [phone]

## 2024-02-14 NOTE — UTILIZATION REVIEW
NOTIFICATION OF ADMISSION DISCHARGE   This is a Notification of Discharge from [facility]. Please be advised that this patient has been discharge from our facility. Below you will find the admission and discharge date and time including the patient’s disposition.   UTILIZATION REVIEW CONTACT:  Scott Hawkins  Utilization   Network Utilization Review Department  Phone: [phone] x carefully listen to the prompts. All voicemails are confidential.  Email: [email or web address]     ADMISSION INFORMATION  PRESENTATION DATE: 2/7/2024  4:07 PM  OBERVATION ADMISSION DATE:   INPATIENT ADMISSION DATE: 2/9/24  1:00 PM   DISCHARGE DATE: 2/13/2024  1:50 PM   DISPOSITION:[facility] Utilization Review Department  ATTENTION: Please call with any questions or concerns to [phone] and carefully listen to the prompts so that you are directed to the right person. All voicemails are confidential.   For Discharge needs, contact Care Management DC Support Team at [phone] opt. 2  Send all requests for admission clinical reviews, approved or denied determinations and any other requests to dedicated fax number below belonging to the campus where the patient is receiving treatment. List of dedicated fax numbers for the Facilities:  FACILITY NAME UR FAX NUMBER   ADMISSION DENIALS (Administrative/Medical Necessity) [phone]   DISCHARGE SUPPORT TEAM ([facility]) [phone]   PARENT CHILD HEALTH (Maternity/NICU/Pediatrics) [phone]   [facility] [phone]   [facility] [phone]   [facility] [phone]   [facility] [phone]   [facility] [phone]   [facility] [phone]   [facility] [phone]   [facility] [phone]   [facility] [phone]   [facility] [phone]   [facility] [phone]   [facility] [phone]

## 2024-02-14 NOTE — ASSESSMENT & PLAN NOTE
Reports decreased urinary output and having to push to urinate  Bladder scans within normal limits, no evidence of hydroureteronephrosis on ultrasound  Valuated by urology at [facility] and placed on Ditropan  Will need outpatient urodynamic studies as well as discussion on further medical management

## 2024-02-14 NOTE — ANESTHESIA PREPROCEDURE EVALUATION
Procedure:  ERCP    Mast cell activations syndrome, got her cocktail of steroids, benadryl this am    Relevant Problems   CARDIO   (+) Atrial tachycardia   (+) Migraine      ENDO   (+) Acquired hypothyroidism      GI/HEPATIC   (+) GERD without esophagitis      /RENAL   (+) Nephrolithiasis      HEMATOLOGY   (+) Iron deficiency anemia due to chronic blood loss      MUSCULOSKELETAL   (+) Chondromalacia   (+) Chronic back pain   (+) Fibromyalgia   (+) Primary localized osteoarthrosis of ankle and foot      NEURO/PSYCH   (+) Chronic back pain   (+) Depression due to physical illness   (+) Fibromyalgia   (+) Generalized anxiety disorder   (+) Major depressive disorder with psychotic features (HCC)   (+) Migraine   (+) Recurrent major depressive disorder, in partial remission (HCC)   (+) Seizure (HCC)      Other   (+) 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HCC)        Physical Exam    Airway    Mallampati score: II  TM Distance: >3 FB  Neck ROM: full     Dental   No notable dental hx     Cardiovascular  Rhythm: regular, Rate: normal, Cardiovascular exam normal    Pulmonary  Pulmonary exam normal Breath sounds clear to auscultation    Other Findings  post-pubertal.      Anesthesia Plan  ASA Score- 2     Anesthesia Type- general with ASA Monitors.         Additional Monitors:     Airway Plan: ETT.    Comment: Pretreatment with IV solumerol, pepcid and diphenydramine ordered and confirmed with Pharmacy.       Plan Factors-    Chart reviewed.    Patient summary reviewed.    Patient is not a current smoker. Patient not instructed to abstain from smoking on day of procedure. Patient did not smoke on day of surgery.            Induction- intravenous.    Postoperative Plan- Plan for postoperative opioid use.     Informed Consent- Anesthetic plan and risks discussed with patient.  I personally reviewed this patient with the CRNA. Discussed and agreed on the Anesthesia Plan with the CRNA..

## 2024-02-15 ENCOUNTER — APPOINTMENT (INPATIENT)
Dept: CT IMAGING | Facility: HOSPITAL | Age: 49
DRG: 444 | End: 2024-02-15
Payer: COMMERCIAL

## 2024-02-15 PROBLEM — A41.9 SEPSIS WITHOUT ACUTE ORGAN DYSFUNCTION (HCC): Status: ACTIVE | Noted: 2024-02-15

## 2024-02-15 LAB
ALBUMIN SERPL BCP-MCNC: 3.4 G/DL (ref 3.5–5)
ALP SERPL-CCNC: 213 U/L (ref 34–104)
ALT SERPL W P-5'-P-CCNC: 577 U/L (ref 7–52)
ANION GAP SERPL CALCULATED.3IONS-SCNC: 11 MMOL/L
AST SERPL W P-5'-P-CCNC: 677 U/L (ref 13–39)
BACTERIA UR QL AUTO: ABNORMAL /HPF
BILIRUB DIRECT SERPL-MCNC: 1.86 MG/DL (ref 0–0.2)
BILIRUB SERPL-MCNC: 2.49 MG/DL (ref 0.2–1)
BILIRUB UR QL STRIP: NEGATIVE
BUN SERPL-MCNC: 7 MG/DL (ref 5–25)
CALCIUM SERPL-MCNC: 8 MG/DL (ref 8.4–10.2)
CHLORIDE SERPL-SCNC: 104 MMOL/L (ref 96–108)
CLARITY UR: CLEAR
CO2 SERPL-SCNC: 22 MMOL/L (ref 21–32)
COLOR UR: YELLOW
CREAT SERPL-MCNC: 0.76 MG/DL (ref 0.6–1.3)
ERYTHROCYTE [DISTWIDTH] IN BLOOD BY AUTOMATED COUNT: 14.7 % (ref 11.6–15.1)
GFR SERPL CREATININE-BSD FRML MDRD: 91 ML/MIN/1.73SQ M
GLUCOSE SERPL-MCNC: 104 MG/DL (ref 65–140)
GLUCOSE UR STRIP-MCNC: NEGATIVE MG/DL
HCT VFR BLD AUTO: 41 % (ref 34.8–46.1)
HGB BLD-MCNC: 13.8 G/DL (ref 11.5–15.4)
HGB UR QL STRIP.AUTO: ABNORMAL
KETONES UR STRIP-MCNC: NEGATIVE MG/DL
LACTATE SERPL-SCNC: 2 MMOL/L (ref 0.5–2)
LEUKOCYTE ESTERASE UR QL STRIP: NEGATIVE
LIPASE SERPL-CCNC: 11 U/L (ref 11–82)
MCH RBC QN AUTO: 32.8 PG (ref 26.8–34.3)
MCHC RBC AUTO-ENTMCNC: 33.7 G/DL (ref 31.4–37.4)
MCV RBC AUTO: 97 FL (ref 82–98)
NITRITE UR QL STRIP: NEGATIVE
NON-SQ EPI CELLS URNS QL MICRO: ABNORMAL /HPF
PH UR STRIP.AUTO: 6 [PH]
PLATELET # BLD AUTO: 395 THOUSANDS/UL (ref 149–390)
PMV BLD AUTO: 9.3 FL (ref 8.9–12.7)
POTASSIUM SERPL-SCNC: 3.6 MMOL/L (ref 3.5–5.3)
PROT SERPL-MCNC: 5.6 G/DL (ref 6.4–8.4)
PROT UR STRIP-MCNC: NEGATIVE MG/DL
RBC # BLD AUTO: 4.21 MILLION/UL (ref 3.81–5.12)
RBC #/AREA URNS AUTO: ABNORMAL /HPF
SODIUM SERPL-SCNC: 137 MMOL/L (ref 135–147)
SP GR UR STRIP.AUTO: 1 (ref 1–1.03)
UROBILINOGEN UR STRIP-ACNC: <2 MG/DL
WBC # BLD AUTO: 19.86 THOUSAND/UL (ref 4.31–10.16)
WBC #/AREA URNS AUTO: ABNORMAL /HPF

## 2024-02-15 PROCEDURE — 83605 ASSAY OF LACTIC ACID: CPT | Performed by: INTERNAL MEDICINE

## 2024-02-15 PROCEDURE — 87186 SC STD MICRODIL/AGAR DIL: CPT | Performed by: INTERNAL MEDICINE

## 2024-02-15 PROCEDURE — 80048 BASIC METABOLIC PNL TOTAL CA: CPT | Performed by: PHYSICIAN ASSISTANT

## 2024-02-15 PROCEDURE — 80076 HEPATIC FUNCTION PANEL: CPT | Performed by: STUDENT IN AN ORGANIZED HEALTH CARE EDUCATION/TRAINING PROGRAM

## 2024-02-15 PROCEDURE — 99232 SBSQ HOSP IP/OBS MODERATE 35: CPT | Performed by: INTERNAL MEDICINE

## 2024-02-15 PROCEDURE — 87040 BLOOD CULTURE FOR BACTERIA: CPT | Performed by: INTERNAL MEDICINE

## 2024-02-15 PROCEDURE — 81001 URINALYSIS AUTO W/SCOPE: CPT | Performed by: INTERNAL MEDICINE

## 2024-02-15 PROCEDURE — 85027 COMPLETE CBC AUTOMATED: CPT | Performed by: INTERNAL MEDICINE

## 2024-02-15 PROCEDURE — 87154 CUL TYP ID BLD PTHGN 6+ TRGT: CPT | Performed by: INTERNAL MEDICINE

## 2024-02-15 PROCEDURE — 74177 CT ABD & PELVIS W/CONTRAST: CPT

## 2024-02-15 PROCEDURE — 83690 ASSAY OF LIPASE: CPT | Performed by: STUDENT IN AN ORGANIZED HEALTH CARE EDUCATION/TRAINING PROGRAM

## 2024-02-15 RX ORDER — ACETAMINOPHEN 325 MG/1
650 TABLET ORAL EVERY 8 HOURS SCHEDULED
Status: DISCONTINUED | OUTPATIENT
Start: 2024-02-15 | End: 2024-03-07 | Stop reason: HOSPADM

## 2024-02-15 RX ORDER — HYDROMORPHONE HCL/PF 1 MG/ML
0.5 SYRINGE (ML) INJECTION ONCE
Status: COMPLETED | OUTPATIENT
Start: 2024-02-15 | End: 2024-02-15

## 2024-02-15 RX ORDER — DIPHENHYDRAMINE HYDROCHLORIDE 50 MG/ML
25 INJECTION INTRAMUSCULAR; INTRAVENOUS ONCE
Status: COMPLETED | OUTPATIENT
Start: 2024-02-15 | End: 2024-02-15

## 2024-02-15 RX ORDER — HYDROMORPHONE HCL/PF 1 MG/ML
1 SYRINGE (ML) INJECTION EVERY 4 HOURS PRN
Status: DISCONTINUED | OUTPATIENT
Start: 2024-02-15 | End: 2024-02-22

## 2024-02-15 RX ORDER — SCOLOPAMINE TRANSDERMAL SYSTEM 1 MG/1
1 PATCH, EXTENDED RELEASE TRANSDERMAL
Status: DISCONTINUED | OUTPATIENT
Start: 2024-02-15 | End: 2024-02-22

## 2024-02-15 RX ADMIN — DIPHENHYDRAMINE HYDROCHLORIDE 25 MG: 50 INJECTION, SOLUTION INTRAMUSCULAR; INTRAVENOUS at 10:42

## 2024-02-15 RX ADMIN — HYDROMORPHONE HYDROCHLORIDE 1 MG: 0.5 INJECTION, SOLUTION INTRAMUSCULAR; INTRAVENOUS; SUBCUTANEOUS at 16:15

## 2024-02-15 RX ADMIN — PIPERACILLIN SODIUM AND TAZOBACTAM SODIUM 3.38 G: 36; 4.5 INJECTION, POWDER, LYOPHILIZED, FOR SOLUTION INTRAVENOUS at 20:16

## 2024-02-15 RX ADMIN — SODIUM CHLORIDE 125 ML/HR: 0.9 INJECTION, SOLUTION INTRAVENOUS at 01:56

## 2024-02-15 RX ADMIN — ACETAMINOPHEN 325MG 650 MG: 325 TABLET ORAL at 21:04

## 2024-02-15 RX ADMIN — MIRTAZAPINE 15 MG: 15 TABLET, FILM COATED ORAL at 21:04

## 2024-02-15 RX ADMIN — DIPHENHYDRAMINE HYDROCHLORIDE 50 MG: 50 INJECTION, SOLUTION INTRAMUSCULAR; INTRAVENOUS at 07:29

## 2024-02-15 RX ADMIN — HYDROMORPHONE HYDROCHLORIDE 0.5 MG: 0.5 INJECTION, SOLUTION INTRAMUSCULAR; INTRAVENOUS; SUBCUTANEOUS at 05:51

## 2024-02-15 RX ADMIN — DIPHENHYDRAMINE HYDROCHLORIDE 50 MG: 50 INJECTION, SOLUTION INTRAMUSCULAR; INTRAVENOUS at 14:24

## 2024-02-15 RX ADMIN — HYDROMORPHONE HYDROCHLORIDE 0.5 MG: 1 INJECTION, SOLUTION INTRAMUSCULAR; INTRAVENOUS; SUBCUTANEOUS at 01:56

## 2024-02-15 RX ADMIN — HYDROMORPHONE HYDROCHLORIDE 0.5 MG: 1 INJECTION, SOLUTION INTRAMUSCULAR; INTRAVENOUS; SUBCUTANEOUS at 07:29

## 2024-02-15 RX ADMIN — DIPHENHYDRAMINE HYDROCHLORIDE 50 MG: 50 INJECTION, SOLUTION INTRAMUSCULAR; INTRAVENOUS at 20:15

## 2024-02-15 RX ADMIN — FAMOTIDINE 40 MG: 10 INJECTION INTRAVENOUS at 21:36

## 2024-02-15 RX ADMIN — IOHEXOL 85 ML: 350 INJECTION, SOLUTION INTRAVENOUS at 11:57

## 2024-02-15 RX ADMIN — METHYLPREDNISOLONE SODIUM SUCCINATE 40 MG: 40 INJECTION, POWDER, FOR SOLUTION INTRAMUSCULAR; INTRAVENOUS at 07:29

## 2024-02-15 RX ADMIN — ONDANSETRON 4 MG: 2 INJECTION INTRAMUSCULAR; INTRAVENOUS at 05:47

## 2024-02-15 RX ADMIN — OXYBUTYNIN CHLORIDE 5 MG: 5 TABLET ORAL at 22:38

## 2024-02-15 RX ADMIN — FAMOTIDINE 20 MG: 10 INJECTION INTRAVENOUS at 07:29

## 2024-02-15 RX ADMIN — HYDROMORPHONE HYDROCHLORIDE 0.5 MG: 1 INJECTION, SOLUTION INTRAMUSCULAR; INTRAVENOUS; SUBCUTANEOUS at 20:15

## 2024-02-15 RX ADMIN — HYDROMORPHONE HYDROCHLORIDE 0.5 MG: 1 INJECTION, SOLUTION INTRAMUSCULAR; INTRAVENOUS; SUBCUTANEOUS at 14:24

## 2024-02-15 RX ADMIN — HYDROMORPHONE HYDROCHLORIDE 1 MG: 0.5 INJECTION, SOLUTION INTRAMUSCULAR; INTRAVENOUS; SUBCUTANEOUS at 22:38

## 2024-02-15 RX ADMIN — DIPHENHYDRAMINE HYDROCHLORIDE 50 MG: 50 INJECTION, SOLUTION INTRAMUSCULAR; INTRAVENOUS at 01:56

## 2024-02-15 RX ADMIN — ACETAMINOPHEN 325MG 650 MG: 325 TABLET ORAL at 14:23

## 2024-02-15 RX ADMIN — LIOTHYRONINE SODIUM 5 MCG: 5 TABLET ORAL at 05:48

## 2024-02-15 RX ADMIN — SCOPALAMINE 1 PATCH: 1 PATCH, EXTENDED RELEASE TRANSDERMAL at 10:10

## 2024-02-15 RX ADMIN — PIPERACILLIN SODIUM AND TAZOBACTAM SODIUM 3.38 G: 36; 4.5 INJECTION, POWDER, LYOPHILIZED, FOR SOLUTION INTRAVENOUS at 14:25

## 2024-02-15 RX ADMIN — MONTELUKAST 10 MG: 10 TABLET, FILM COATED ORAL at 21:04

## 2024-02-15 RX ADMIN — PIPERACILLIN SODIUM AND TAZOBACTAM SODIUM 3.38 G: 36; 4.5 INJECTION, POWDER, LYOPHILIZED, FOR SOLUTION INTRAVENOUS at 10:11

## 2024-02-15 RX ADMIN — HYDROMORPHONE HYDROCHLORIDE 0.5 MG: 0.5 INJECTION, SOLUTION INTRAMUSCULAR; INTRAVENOUS; SUBCUTANEOUS at 10:07

## 2024-02-15 NOTE — ASSESSMENT & PLAN NOTE
Patient with history of Crohn's disease she is on Skyrizi as well as intravenous methotrexate which he takes on Thursday via infusion.  Given location of symptoms suspect that this is likely related to biliary source.  She did have stent placed in 2014.  Symptoms present since July.  Transferred for ERCP, scheduled today at 1 PM  Also with history of endometriosis, was reviewed with Dr. Jennings, he does not believe that this is related.  He recommends outpatient follow-up.  Patient will follow-up with Dr. Benito as previously instructed.  MRCP without any evidence of obvious obstruction, she is receiving significant amounts of opioids to control her pain  Has been n.p.o. since midnight for ERCP today  Patient requires IV pepcid and IV solumedrol prior to procedures, initiated on 2/11 at [facility]  Continue IV fluid hydration while n.p.o.

## 2024-02-15 NOTE — ASSESSMENT & PLAN NOTE
Continue prior to admission medication  Continue outpatient immunology follow-up  Patient reports intolerance to all medications with corn.  Continue premedication with Solu-Medrol and Pepcid

## 2024-02-15 NOTE — SEPSIS NOTE
Sepsis Note   Celine Roa 48 y.o. female MRN: [id number]  Unit/Bed#: [address]-01 Encounter: [id number]       Initial Sepsis Screening       Row Name 02/15/24 0828                Is the patient's history suggestive of a new or worsening infection? Yes (Proceed)  -TT        Suspected source of infection acute abdominal infection  -TT        Indicate SIRS criteria Hyperthemia > 38.3C (100.9F) OR Hypothermia <36C (96.8F);Tachycardia > 90 bpm;Tachypnea > 20 resp per min;Leukocytosis (WBC > 34006 IJL) OR Leukopenia (WBC <4000 IJL) OR Bandemia (WBC >10% bands)  -TT        Are two or more of the above signs & symptoms of infection both present and new to the patient? Yes (Proceed)  -TT        Assess for evidence of organ dysfunction: Are any of the below criteria present within 6 hours of suspected infection and SIRS criteria that are NOT considered to be chronic conditions? --                  User Key  (r) = Recorded By, (t) = Taken By, (c) = Cosigned By      Initials Name Provider Type    TT Eagle Babin MD Physician                        Body mass index is 27.06 kg/m².  Wt Readings from Last 1 Encounters:   02/15/24 74.3 kg (163 lb 12.8 oz)        Ideal body weight: 57.5 kg (126 lb 13.7 oz)  Adjusted ideal body weight: 64.2 kg (141 lb 10.2 oz)

## 2024-02-15 NOTE — ASSESSMENT & PLAN NOTE
Patient noted to have fever this morning along with tachycardia, tachypnea, and highly elevated white count  Sepsis alert called; patient started on Zosyn  Has been receiving IV fluids and patient reported history of heart failure (although recent echo does not demonstrate that)  Will hold off on early goal-directed fluid resuscitation for sepsis given the fact that she is already received fluids and will continue gentle IV hydration  Monitor on daily labs; CT abdomen pelvis ordered by GI who is following along

## 2024-02-15 NOTE — PROGRESS NOTES
Progress Note -  [facility]  Celine AMIRA Crispin 48 y.o. 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  GI was consulted for further management.    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  History of Crohn's disease  Nausea, vomiting  Fever  Gastritis  Elevated LFTs  Has persistent abdominal pain even after ERCP.  Had some vomiting overnight as well.  ERCP showed some erosions in the antrum, extension of prior sphincterotomy.  Multiple balloon passes were made without any evidence of significant strictures.  Unclear etiology of abdominal pain.  Low suspicion for post ERCP pancreatitis given atypical pain.  Lipase normal.  Has acute elevation of LFTs.  Cholangitis is possible.  This can also be likely due to edema from procedure.  This should improve in the next 24 hours.  Infectious workup per primary team.  Agree with antibiotic therapy.  Currently on Zosyn.  Follow-up blood cultures.  Check CT with contrast.  Add scopolamine patch for nausea.  Increase Pepcid to 40 mg twice daily.  Continue as needed Zofran for nausea.  Clear liquid diet.  IV fluids per primary team.  Pain management per primary team.  Monitor patient's symptoms.  Monitor LFTs daily.    Rest of care per primary team.    ______________________________________________________________________    Subjective: Seen and examined.  After procedure patient tolerated dinner.  During the night she had episode of vomiting that was nonbloody and nonbilious.  She also admitted to burning sensations in her face as well as fever this morning.  Still has persistent  right upper quadrant abdominal pain.    REVIEW OF SYSTEMS:    Review of Systems   Constitutional:  Negative for chills and fever.   HENT:  Negative for congestion and sinus pressure.    Respiratory:  Negative for cough and shortness of breath.    Cardiovascular:  Negative for chest pain, palpitations and leg swelling.   Gastrointestinal:  Positive for abdominal pain and nausea. Negative for diarrhea and vomiting.   Genitourinary:  Negative for dysuria and hematuria.   Musculoskeletal:  Negative for arthralgias and back pain.   Skin:  Negative for color change and rash.   Neurological:  Negative for dizziness and headaches.   Psychiatric/Behavioral:  Negative for agitation and confusion.    All other systems reviewed and are negative.         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 pt denies, documented in cardio note    Pancreatitis     sphinger of     Psychiatric disorder     RA (rheumatoid arthritis) (HCC)     Seizures (HCC)     Ulcerative colitis (HCC)     Visual impairment      Past Surgical History:   Procedure Laterality Date    ABDOMINAL SURGERY  2017    APPENDECTOMY      CHOLECYSTECTOMY      COLONOSCOPY  2019    CYSTOSCOPY N/A 6/8/2023    Procedure: CYSTOSCOPY, mini TURBT with fulgeration;  Surgeon: Tee Bruno MD;  Location: [facility];  Service: Urology    EGD AND COLONOSCOPY N/A 09/12/2017     Procedure: EGD AND COLONOSCOPY;  Surgeon: Tommy Oliver MD;  Location:  GI LAB;  Service: Gastroenterology    ERCP N/A 09/15/2017    Procedure: ENDOSCOPIC RETROGRADE CHOLANGIOPANCREATOGRAPHY (ERCP);  Surgeon: Dre Soto MD;  Location:  GI LAB;  Service: Gastroenterology    HYSTERECTOMY      KNEE SURGERY Right     LAPAROSCOPIC ENDOMETRIOSIS FULGURATION      ORIF ANKLE FRACTURE Left     AZ ARTHRS KNE SURG W/MENISCECTOMY MED/LAT W/SHVG Left 05/12/2017    Procedure: POSSIBLE MEDIAL MENISECTOMY;  Surgeon: Kristian Avila MD;  Location: [facility];  Service: Orthopedics    AZ ARTHRS KNEE DEBRIDEMENT/SHAVING ARTCLR CRTLG Left 05/12/2017    Procedure: KNEE ARTHROSCOPY EVALUATION, CHONDROPLASTY;  Surgeon: Kristian Avila MD;  Location: [facility];  Service: Orthopedics    AZ LAPS ABD PRTM&OMENTUM DX W/WO SPEC BR/WA SPX N/A 12/12/2017    Procedure: LAPAROSCOPY DIAGNOSTIC  WITH LYSIS OF ADHESIONS;  Surgeon: Olaf John DO;  Location:  MAIN OR;  Service: General    UPPER GASTROINTESTINAL ENDOSCOPY  2019     Social History   Social History     Substance and Sexual Activity   Alcohol Use Never     Social History     Substance and Sexual Activity   Drug Use Never     Social History     Tobacco Use   Smoking Status Never   Smokeless Tobacco Never     Family History   Problem Relation Age of Onset    Ulcerative colitis Mother     Arthritis Mother     Colon polyps Mother     Heart failure Father     Neuropathy Father     Macular degeneration Father     Diabetes Father     Early death Father     Vision loss Father     Asthma Daughter     Hypothyroidism Daughter     Heart disease Maternal Grandmother     Arthritis Maternal Grandmother     No Known Problems Maternal Grandfather     Arthritis Paternal Grandmother     Macular degeneration Paternal Grandmother     Vision loss Paternal Grandmother     Lymphoma Paternal Grandfather     Cancer Paternal Grandfather     Early death Paternal Grandfather     Breast cancer Maternal Aunt      "Cancer Maternal Aunt     Miscarriages / Stillbirths Maternal Aunt     Heart failure Paternal Aunt     Heart failure Paternal Aunt     Irritable bowel syndrome Paternal Aunt     Breast cancer Paternal Aunt 54    Breast cancer additional onset Paternal Aunt 58    Hypothyroidism Paternal Aunt     Cancer Paternal Aunt     No Known Problems Son     No Known Problems Son     Kidney disease Brother     Depression Paternal Uncle     Early death Paternal Uncle        Meds/Allergies     Medications Prior to Admission   Medication    Ascorbic Acid (vitamin C) 1000 MG tablet    B-D 3CC LUER-SHAMEKA SYR 25GX1\" 25G X 1\" 3 ML MISC    butalbital-acetaminophen-caffeine (FIORICET,ESGIC) -40 mg per tablet    cetirizine (ZyrTEC) 10 mg tablet    cholecalciferol (VITAMIN D3) 1,000 units tablet    Cholecalciferol 10 MCG /0.025ML LIQD    cyanocobalamin (VITAMIN B-12) 100 mcg tablet    diclofenac (VOLTAREN) 75 mg EC tablet    diphenhydrAMINE (BENADRYL) 50 mg capsule    EpiPen 2-Malachi 0.3 MG/0.3ML SOAJ    famotidine (PEPCID) 40 MG tablet    folic acid (KP Folic Acid) 1 mg tablet    hydrOXYzine HCL (ATARAX) 25 mg tablet    Lactobacillus (PROBIOTIC ACIDOPHILUS PO)    Lactobacillus-Inulin (Culturelle Digestive Health) CAPS    levalbuterol (XOPENEX HFA) 45 mcg/act inhaler    levothyroxine 100 mcg tablet    liothyronine (CYTOMEL) 5 mcg tablet    magnesium gluconate (MAGONATE) 500 mg tablet    Melatonin 3 MG SUBL    melatonin 3 mg    methotrexate 50 MG/2ML injection    methotrexate 50 MG/2ML injection    mirtazapine (REMERON) 15 mg tablet    montelukast (SINGULAIR) 10 mg tablet    NEEDLE, DISP, 27 G (B-D DISP NEEDLE 24RI1-9/4\") 27G X 1-1/4\" MISC    NON FORMULARY    nystatin (MYCOSTATIN) 500,000 units/5 mL suspension    omalizumab (XOLAIR) 150 mg    pantoprazole (PROTONIX) 40 mg tablet    predniSONE 20 mg tablet    Riboflavin (VITAMIN B-2 PO)    Riboflavin (Vitamin B-2) 100 MG TABS    risankizumab-rzaa (Skyrizi) 360 MG/2.4ML SOCT    scopolamine " (TRANSDERM-SCOP) 1 mg/3 days TD 72 hr patch    TechLite Lancets MISC    traMADol (Ultram) 50 mg tablet     Current Facility-Administered Medications   Medication Dose Route Frequency    butalbital-acetaminophen-caffeine (FIORICET,ESGIC) -40 mg per tablet 1 tablet  1 tablet Oral Q4H PRN    diphenhydrAMINE (BENADRYL) injection 50 mg  50 mg Intravenous Q4H PRN    Famotidine (PF) (PEPCID) injection 20 mg  20 mg Intravenous Q12H [facility]    folic acid (FOLVITE) tablet 1 mg  1 mg Oral Daily    HYDROmorphone (DILAUDID) injection 0.5 mg  0.5 mg Intravenous Q4H PRN    HYDROmorphone (DILAUDID) injection 0.5 mg  0.5 mg Intravenous Q4H PRN    hydrOXYzine HCL (ATARAX) tablet 15 mg  15 mg Oral Daily    levothyroxine tablet 75 mcg  75 mcg Oral Every Other Day    liothyronine (CYTOMEL) tablet 5 mcg  5 mcg Oral Daily    loratadine (CLARITIN) tablet 10 mg  10 mg Oral Daily    methylPREDNISolone sodium succinate (Solu-MEDROL) injection 40 mg  40 mg Intravenous Daily    mirtazapine (REMERON) tablet 15 mg  15 mg Oral HS    montelukast (SINGULAIR) tablet 10 mg  10 mg Oral HS    ondansetron (ZOFRAN) injection 4 mg  4 mg Intravenous Q6H PRN    oxybutynin (DITROPAN) tablet 5 mg  5 mg Oral BID PRN    potassium chloride (Klor-Con M20) CR tablet 40 mEq  40 mEq Oral Once    sodium chloride 0.9 % infusion  125 mL/hr Intravenous Continuous       Allergies   Allergen Reactions    Aimovig [Erenumab-Aooe] Anaphylaxis    Amitriptyline Anaphylaxis     According to the patient she had nphylaxis    Aspergillus Allergy Skin Test Other (See Comments), Hives and Swelling    Azathioprine Other (See Comments) and Anaphylaxis     pancreatitis  According to patient she needed Epipen    Buspar [Buspirone] Hives and Shortness Of Breath    Citric Acid-Polysorbate 80 Abdominal Pain, Anaphylaxis, Anxiety, Bradycardia, Diarrhea, Fatigue, GI Intolerance, Headache, Hives, Hyperactivity, Itching, Lip Swelling, Nausea Only, Palpitations, Rash, Shortness Of Breath,  "Tachycardia, Throat Swelling, Tongue Swelling and Vomiting    Corn Dextrin Anaphylaxis     Any corn based products    Erenumab Anaphylaxis     patient sent portal message stating she had anaphylaxis  patient sent portal message stating she had anaphylaxis      Gadolinium Abdominal Pain, Anaphylaxis, Anxiety, Confusion, Delirium, Dizziness, Eye Swelling, Fatigue, GI Intolerance, Headache, Hives, Irritability, Itching, Lip Swelling, Nausea Only, Palpitations, Photosensitivity, Rash, Seizures, Shortness Of Breath, Swelling, Syncope, Throat Swelling, Tongue Swelling, Tremor, Visual Disturbance and Vomiting    Gelatin - Food Allergy Anaphylaxis    Heparin Hives     Painful welts     Lavender Oil Anaphylaxis     Other reaction(s): anaphalxis    Malto Dextrin [Dextrin] Anaphylaxis    Other Anaphylaxis     Gel caps, maltodextrose, dextrose,grapes, lavender     Penicillium Notatum Shortness Of Breath and Swelling    Red Dye - Food Allergy Anaphylaxis and Other (See Comments)     intolerance    Sumatriptan Anaphylaxis     Bradycardia, sob, back pressure, head pain,throat tightness  According to the patient she had anphylaxis    Ustekinumab Anaphylaxis    Contrast [Iodinated Contrast Media] Other (See Comments)     Pt states needs to be premedicated prior to injection    Corn Oil - Food Allergy - Food Allergy Hives    Humira [Adalimumab] Other (See Comments)     \"I develop drug induced lupus\"    Ibuprofen Hives and Throat Swelling    Polyethylene Glycol Diarrhea and Vomiting    Remicade [Infliximab] Other (See Comments)     \"I develop drug induced lupus\"    Sulfa Antibiotics Hives and Other (See Comments)    Sulfate Hives    Sulfites - Food Allergy Hives    Sweet Corn Extract Skin Test - Food Allergy Hives and Itching    Chlorhexidine Itching    Freederm Adhesive Remover [New Skin] Rash    Histamine Hives, Rash and Other (See Comments)     Intolerance      Wound Dressings Rash           Objective     Blood pressure 114/70, " pulse (!) 113, temperature (!) 101.6 °F (38.7 °C), temperature source Temporal, resp. rate 22, weight 74.3 kg (163 lb 12.8 oz), SpO2 98%, not currently breastfeeding. Body mass index is 27.06 kg/m².      Intake/Output Summary (Last 24 hours) at 2/15/2024 0749  Last data filed at 2/15/2024 0301  Gross per 24 hour   Intake 1100 ml   Output 2600 ml   Net -1500 ml         PHYSICAL EXAM:      Physical Exam  Vitals and nursing note reviewed.   Constitutional:       General: She is not in acute distress.     Appearance: Normal appearance. She is ill-appearing.   HENT:      Head: Normocephalic and atraumatic.      Mouth/Throat:      Mouth: Mucous membranes are moist.   Eyes:      Extraocular Movements: Extraocular movements intact.      Conjunctiva/sclera: Conjunctivae normal.   Cardiovascular:      Pulses: Normal pulses.   Pulmonary:      Effort: Pulmonary effort is normal.   Abdominal:      General: Abdomen is flat. Bowel sounds are normal. There is no distension.      Palpations: Abdomen is soft.      Tenderness: There is abdominal tenderness. There is no guarding.   Skin:     General: Skin is warm and dry.   Neurological:      General: No focal deficit present.      Mental Status: She is alert and oriented to person, place, and time.   Psychiatric:         Mood and Affect: Mood normal.         Behavior: Behavior normal.          Lab Results:   Admission on 02/13/2024   Component Date Value    Sodium 02/14/2024 141     Potassium 02/14/2024 3.3 (L)     Chloride 02/14/2024 107     CO2 02/14/2024 27     ANION GAP 02/14/2024 7     BUN 02/14/2024 6     Creatinine 02/14/2024 0.71     Glucose 02/14/2024 71     Calcium 02/14/2024 8.0 (L)     Corrected Calcium 02/14/2024 8.6     AST 02/14/2024 12 (L)     ALT 02/14/2024 18     Alkaline Phosphatase 02/14/2024 45     Total Protein 02/14/2024 5.5 (L)     Albumin 02/14/2024 3.3 (L)     Total Bilirubin 02/14/2024 0.21     eGFR 02/14/2024 99     WBC 02/14/2024 14.43 (H)     RBC  02/14/2024 3.78 (L)     Hemoglobin 02/14/2024 12.8     Hematocrit 02/14/2024 37.7     MCV 02/14/2024 100 (H)     MCH 02/14/2024 33.9     MCHC 02/14/2024 34.0     RDW 02/14/2024 14.6     Platelets 02/14/2024 418 (H)     MPV 02/14/2024 9.5     WBC 02/15/2024 19.86 (H)     RBC 02/15/2024 4.21     Hemoglobin 02/15/2024 13.8     Hematocrit 02/15/2024 41.0     MCV 02/15/2024 97     MCH 02/15/2024 32.8     MCHC 02/15/2024 33.7     RDW 02/15/2024 14.7     Platelets 02/15/2024 395 (H)     MPV 02/15/2024 9.3     Sodium 02/15/2024 137     Potassium 02/15/2024 3.6     Chloride 02/15/2024 104     CO2 02/15/2024 22     ANION GAP 02/15/2024 11     BUN 02/15/2024 7     Creatinine 02/15/2024 0.76     Glucose 02/15/2024 104     Calcium 02/15/2024 8.0 (L)     eGFR 02/15/2024 91        Imaging Studies: I have personally reviewed pertinent imaging studies.    Ruben Guadarrama D.O.  Gastroenterology Fellow  PGY-5  Available via [employer]  2/15/2024 7:49 AM

## 2024-02-15 NOTE — PROGRESS NOTES
[facility]  Progress Note  Name: Celine Roa I  MRN: [id number]  Unit/Bed#: [place] [address] -01 I Date of Admission: 2/13/2024   Date of Service: 2/15/2024 I Hospital Day: 2    Assessment/Plan   Sepsis without acute organ dysfunction (HCC)  Assessment & Plan  Patient noted to have fever this morning along with tachycardia, tachypnea, and highly elevated white count  Sepsis alert called; patient started on Zosyn  Has been receiving IV fluids and patient reported history of heart failure (although recent echo does not demonstrate that)  Will hold off on early goal-directed fluid resuscitation for sepsis given the fact that she is already received fluids and will continue gentle IV hydration  Monitor on daily labs; CT abdomen pelvis ordered by GI who is following along    Bladder spasm  Assessment & Plan  Reports decreased urinary output and having to push to urinate  Bladder scans within normal limits, no evidence of hydroureteronephrosis on ultrasound  Valuated by urology at [facility] and placed on Ditropan  Will need outpatient urodynamic studies as well as discussion on further medical management    GERD without esophagitis  Assessment & Plan  Continue Pepcid    Crohn's colitis (HCC)  Assessment & Plan  History of Crohn's disease on Skyrizi as well as methotrexate  Continue GI follow-up  Is due on Thursday for methotrexate and receives  intravenously  Resume folic acid    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HCC)  Assessment & Plan  Continue prior to admission medication  Continue outpatient immunology follow-up  Patient reports intolerance to all medications with corn.  Continue premedication with Solu-Medrol and Pepcid    Acquired hypothyroidism  Assessment & Plan  Prior to admission on Cytomel, resume substitute with levothyroxine    Hypokalemia  Assessment & Plan  Continue repletion  Monitor daily  Recent Labs     02/13/24  0552 02/14/24  0452 02/15/24  0612   K 3.1* 3.3* 3.6         *  Abdominal pain  Assessment & Plan  Patient with history of Crohn's disease she is on Skyrizi as well as intravenous methotrexate which he takes on Thursday via infusion.  Given location of symptoms suspect that this is likely related to biliary source.  She did have stent placed in 2014.  Symptoms present since July.  Transferred for ERCP, scheduled today at 1 PM  Also with history of endometriosis, was reviewed with Dr. Jennings, he does not believe that this is related.  He recommends outpatient follow-up.  Patient will follow-up with Dr. Benito as previously instructed.  MRCP without any evidence of obvious obstruction, she is receiving significant amounts of opioids to control her pain  Has been n.p.o. since midnight for ERCP today  Patient requires IV pepcid and IV solumedrol prior to procedures, initiated on 2/11 at [facility]  Continue IV fluid hydration while n.p.o.      CHF (congestive heart failure) (HCC)-resolved as of 2/13/2024  Assessment & Plan  Wt Readings from Last 3 Encounters:   02/15/24 74.3 kg (163 lb 12.8 oz)   02/13/24 74.7 kg (164 lb 9.6 oz)   02/09/24 73.4 kg (161 lb 13.1 oz)                          VTE Pharmacologic Prophylaxis: VTE Score: 2 Low Risk (Score 0-2) - Encourage Ambulation.    Mobility:   Basic Mobility Inpatient Raw Score: 24  JH-HLM Goal: 8: Walk 250 feet or more  JH-HLM Achieved: 8: Walk 250 feet ot more  HLM Goal achieved. Continue to encourage appropriate mobility.    Patient Centered Rounds: I performed bedside rounds with nursing staff today.   Discussions with Specialists or Other Care Team Provider: GI    Education and Discussions with Family / Patient: Care plan discussed with patient who voiced understanding and agrees with recommendations.      Total Time Spent on Date of Encounter in care of patient: 45 mins. This time was spent on one or more of the following: performing physical exam; counseling and coordination of care; obtaining or reviewing history;  documenting in the medical record; reviewing/ordering tests, medications or procedures; communicating with other healthcare professionals and discussing with patient's family/caregivers.    Current Length of Stay: 2 day(s)  Current Patient Status: Inpatient   Certification Statement: The patient will continue to require additional inpatient hospital stay due to abdominal pain and sepsis  Discharge Plan: Anticipate discharge in 24-48 hrs to home with home services.    Code Status: Level 1 - Full Code    Subjective:   Patient seen and examined bedside, this morning reported abdominal pain with tenderness to palpation right upper quadrant along with bilateral lower quadrants.  Patient also appeared to be anxious and noted to have fever along with 3 other SIRS criteria; started on Zosyn and IV fluids will be continued.  GI ordered CT with contrast; no acute abnormalities noted.  LFTs highly elevated since date before; will monitor on morning labs and await further GI recommendations.  Patient requesting increase in pain meds along with simultaneous Benadryl administration.    Objective:     Vitals:   Temp (24hrs), Av.2 °F (37.3 °C), Min:97.8 °F (36.6 °C), Max:101.6 °F (38.7 °C)    Temp:  [97.8 °F (36.6 °C)-101.6 °F (38.7 °C)] 98.1 °F (36.7 °C)  HR:  [] 69  Resp:  [16-22] 18  BP: ()/(52-80) 86/52  SpO2:  [91 %-98 %] 91 %  Body mass index is 27.06 kg/m².     Input and Output Summary (last 24 hours):     Intake/Output Summary (Last 24 hours) at 2/15/2024 1654  Last data filed at 2/15/2024 1601  Gross per 24 hour   Intake --   Output 3000 ml   Net -3000 ml       Physical Exam:   Physical Exam  Vitals and nursing note reviewed.   Constitutional:       General: She is not in acute distress.     Appearance: She is well-developed.   HENT:      Head: Normocephalic and atraumatic.   Eyes:      Conjunctiva/sclera: Conjunctivae normal.   Cardiovascular:      Rate and Rhythm: Normal rate and regular rhythm.       Heart sounds: No murmur heard.  Pulmonary:      Effort: Pulmonary effort is normal. No respiratory distress.   Abdominal:      General: There is distension.      Palpations: Abdomen is soft.      Tenderness: There is abdominal tenderness. There is guarding.   Musculoskeletal:         General: No swelling.      Cervical back: Neck supple.   Skin:     General: Skin is warm and dry.   Neurological:      Mental Status: She is alert and oriented to person, place, and time.   Psychiatric:      Comments: Anxious            Additional Data:     Labs:  Results from last 7 days   Lab Units 02/15/24  0612 02/14/24  0452 02/11/24  0545   WBC Thousand/uL 19.86*   < > 7.21   HEMOGLOBIN g/dL 13.8   < > 14.1   HEMATOCRIT % 41.0   < > 43.7   PLATELETS Thousands/uL 395*   < > 380   NEUTROS PCT %  --   --  57   LYMPHS PCT %  --   --  27   MONOS PCT %  --   --  11   EOS PCT %  --   --  4    < > = values in this interval not displayed.     Results from last 7 days   Lab Units 02/15/24  0935 02/15/24  0612   SODIUM mmol/L  --  137   POTASSIUM mmol/L  --  3.6   CHLORIDE mmol/L  --  104   CO2 mmol/L  --  22   BUN mg/dL  --  7   CREATININE mg/dL  --  0.76   ANION GAP mmol/L  --  11   CALCIUM mg/dL  --  8.0*   ALBUMIN g/dL 3.4*  --    TOTAL BILIRUBIN mg/dL 2.49*  --    ALK PHOS U/L 213*  --    ALT U/L 577*  --    AST U/L 677*  --    GLUCOSE RANDOM mg/dL  --  104                 Results from last 7 days   Lab Units 02/15/24  0935   LACTIC ACID mmol/L 2.0       Lines/Drains:  Invasive Devices       Peripheral Intravenous Line  Duration             Peripheral IV 02/07/24 Upper;Ventral (anterior);Left Arm 7 days              Line  Duration             Pump Device  -- days                          Imaging: Reviewed radiology reports from this admission including: abdominal/pelvic CT    Recent Cultures (last 7 days):   Results from last 7 days   Lab Units 02/15/24  0932   BLOOD CULTURE  Received in Microbiology Lab. Culture in Progress.  Received  in Microbiology Lab. Culture in Progress.       Last 24 Hours Medication List:   Current Facility-Administered Medications   Medication Dose Route Frequency Provider Last Rate    acetaminophen  650 mg Oral Q8H [facility] Eagle Babin MD      butalbital-acetaminophen-caffeine  1 tablet Oral Q4H PRN Miles Holm, DO      diphenhydrAMINE  50 mg Intravenous Q4H PRN Miles Holm, DO      famotidine  40 mg Intravenous Q12H KARISSA Ruben G Chirayath, DO      folic acid  1 mg Oral Daily Miles Holm, DO      HYDROmorphone  0.5 mg Intravenous Q4H PRN Elizabeth Bose PA-C      HYDROmorphone  1 mg Intravenous Q4H PRN Eagle Babin MD      hydrOXYzine HCL  15 mg Oral Daily Miles Holm, DO      levothyroxine  75 mcg Oral Every Other Day Miles Holm, DO      liothyronine  5 mcg Oral Daily Miles Holm, DO      loratadine  10 mg Oral Daily Miles Holm, DO      mirtazapine  15 mg Oral HS Miles Holm, DO      montelukast  10 mg Oral HS Mlies Holm, DO      ondansetron  4 mg Intravenous Q6H PRN Miles Holm, DO      oxybutynin  5 mg Oral BID PRN Miles Holm, DO      piperacillin-tazobactam  3.375 g Intravenous Q6H Eagle Babin MD 3.375 g (02/15/24 1425)    potassium chloride  40 mEq Oral Once Ruben G Chirayath, DO      scopolamine  1 patch Transdermal Q72H Ruben G Chirayath, DO      sodium chloride  75 mL/hr Intravenous Continuous Eagle Babin  mL/hr (02/15/24 0156)        Today, Patient Was Seen By: Eagle Babin MD    **Please Note: This note may have been constructed using a voice recognition system.**

## 2024-02-15 NOTE — ASSESSMENT & PLAN NOTE
Continue repletion  Monitor daily  Recent Labs     02/13/24  0552 02/14/24  0452 02/15/24  0612   K 3.1* 3.3* 3.6

## 2024-02-15 NOTE — ASSESSMENT & PLAN NOTE
Wt Readings from Last 3 Encounters:   02/15/24 74.3 kg (163 lb 12.8 oz)   02/13/24 74.7 kg (164 lb 9.6 oz)   02/09/24 73.4 kg (161 lb 13.1 oz)

## 2024-02-15 NOTE — ASSESSMENT & PLAN NOTE
Reports decreased urinary output and having to push to urinate  Bladder scans within normal limits, no evidence of hydroureteronephrosis on ultrasound  Valuated by urology at [facility] and placed on Ditropan  Will need outpatient urodynamic studies as well as discussion on further medical management

## 2024-02-16 LAB
ALBUMIN SERPL BCP-MCNC: 3 G/DL (ref 3.5–5)
ALP SERPL-CCNC: 180 U/L (ref 34–104)
ALT SERPL W P-5'-P-CCNC: 399 U/L (ref 7–52)
ANION GAP SERPL CALCULATED.3IONS-SCNC: 5 MMOL/L
AST SERPL W P-5'-P-CCNC: 239 U/L (ref 13–39)
BASOPHILS # BLD AUTO: 0.1 THOUSANDS/ÂΜL (ref 0–0.1)
BASOPHILS NFR BLD AUTO: 1 % (ref 0–1)
BILIRUB SERPL-MCNC: 2.43 MG/DL (ref 0.2–1)
BUN SERPL-MCNC: 6 MG/DL (ref 5–25)
CALCIUM ALBUM COR SERPL-MCNC: 8.6 MG/DL (ref 8.3–10.1)
CALCIUM SERPL-MCNC: 7.8 MG/DL (ref 8.4–10.2)
CHLORIDE SERPL-SCNC: 109 MMOL/L (ref 96–108)
CO2 SERPL-SCNC: 25 MMOL/L (ref 21–32)
CREAT SERPL-MCNC: 0.59 MG/DL (ref 0.6–1.3)
EOSINOPHIL # BLD AUTO: 0.16 THOUSAND/ÂΜL (ref 0–0.61)
EOSINOPHIL NFR BLD AUTO: 1 % (ref 0–6)
ERYTHROCYTE [DISTWIDTH] IN BLOOD BY AUTOMATED COUNT: 15.6 % (ref 11.6–15.1)
GFR SERPL CREATININE-BSD FRML MDRD: 107 ML/MIN/1.73SQ M
GLUCOSE SERPL-MCNC: 76 MG/DL (ref 65–140)
HCT VFR BLD AUTO: 37.1 % (ref 34.8–46.1)
HGB BLD-MCNC: 12.7 G/DL (ref 11.5–15.4)
IMM GRANULOCYTES # BLD AUTO: 0.08 THOUSAND/UL (ref 0–0.2)
IMM GRANULOCYTES NFR BLD AUTO: 1 % (ref 0–2)
LYMPHOCYTES # BLD AUTO: 1.18 THOUSANDS/ÂΜL (ref 0.6–4.47)
LYMPHOCYTES NFR BLD AUTO: 9 % (ref 14–44)
MAGNESIUM SERPL-MCNC: 1.9 MG/DL (ref 1.9–2.7)
MCH RBC QN AUTO: 33.2 PG (ref 26.8–34.3)
MCHC RBC AUTO-ENTMCNC: 34.2 G/DL (ref 31.4–37.4)
MCV RBC AUTO: 97 FL (ref 82–98)
MONOCYTES # BLD AUTO: 1.17 THOUSAND/ÂΜL (ref 0.17–1.22)
MONOCYTES NFR BLD AUTO: 9 % (ref 4–12)
NEUTROPHILS # BLD AUTO: 10.31 THOUSANDS/ÂΜL (ref 1.85–7.62)
NEUTS SEG NFR BLD AUTO: 79 % (ref 43–75)
NRBC BLD AUTO-RTO: 0 /100 WBCS
PHOSPHATE SERPL-MCNC: 2 MG/DL (ref 2.7–4.5)
PLATELET # BLD AUTO: 330 THOUSANDS/UL (ref 149–390)
PMV BLD AUTO: 9.2 FL (ref 8.9–12.7)
POTASSIUM SERPL-SCNC: 3.2 MMOL/L (ref 3.5–5.3)
PROT SERPL-MCNC: 5.1 G/DL (ref 6.4–8.4)
RBC # BLD AUTO: 3.83 MILLION/UL (ref 3.81–5.12)
SODIUM SERPL-SCNC: 139 MMOL/L (ref 135–147)
WBC # BLD AUTO: 13 THOUSAND/UL (ref 4.31–10.16)

## 2024-02-16 PROCEDURE — 99232 SBSQ HOSP IP/OBS MODERATE 35: CPT | Performed by: INTERNAL MEDICINE

## 2024-02-16 PROCEDURE — 87040 BLOOD CULTURE FOR BACTERIA: CPT | Performed by: INTERNAL MEDICINE

## 2024-02-16 PROCEDURE — 83735 ASSAY OF MAGNESIUM: CPT | Performed by: INTERNAL MEDICINE

## 2024-02-16 PROCEDURE — 84100 ASSAY OF PHOSPHORUS: CPT | Performed by: INTERNAL MEDICINE

## 2024-02-16 PROCEDURE — 85025 COMPLETE CBC W/AUTO DIFF WBC: CPT | Performed by: INTERNAL MEDICINE

## 2024-02-16 PROCEDURE — 80053 COMPREHEN METABOLIC PANEL: CPT | Performed by: INTERNAL MEDICINE

## 2024-02-16 RX ORDER — FAMOTIDINE 20 MG/1
40 TABLET, FILM COATED ORAL 2 TIMES DAILY
Status: DISCONTINUED | OUTPATIENT
Start: 2024-02-16 | End: 2024-02-24

## 2024-02-16 RX ORDER — POTASSIUM CHLORIDE 14.9 MG/ML
20 INJECTION INTRAVENOUS
Qty: 200 ML | Refills: 0 | Status: COMPLETED | OUTPATIENT
Start: 2024-02-16 | End: 2024-02-16

## 2024-02-16 RX ADMIN — MONTELUKAST 10 MG: 10 TABLET, FILM COATED ORAL at 21:28

## 2024-02-16 RX ADMIN — POTASSIUM CHLORIDE 20 MEQ: 14.9 INJECTION, SOLUTION INTRAVENOUS at 21:29

## 2024-02-16 RX ADMIN — FAMOTIDINE 40 MG: 20 TABLET ORAL at 18:02

## 2024-02-16 RX ADMIN — FAMOTIDINE 40 MG: 10 INJECTION INTRAVENOUS at 10:33

## 2024-02-16 RX ADMIN — POTASSIUM CHLORIDE 20 MEQ: 14.9 INJECTION, SOLUTION INTRAVENOUS at 18:03

## 2024-02-16 RX ADMIN — PIPERACILLIN SODIUM AND TAZOBACTAM SODIUM 3.38 G: 36; 4.5 INJECTION, POWDER, LYOPHILIZED, FOR SOLUTION INTRAVENOUS at 15:35

## 2024-02-16 RX ADMIN — PIPERACILLIN SODIUM AND TAZOBACTAM SODIUM 3.38 G: 36; 4.5 INJECTION, POWDER, LYOPHILIZED, FOR SOLUTION INTRAVENOUS at 20:37

## 2024-02-16 RX ADMIN — ACETAMINOPHEN 325MG 650 MG: 325 TABLET ORAL at 13:40

## 2024-02-16 RX ADMIN — HYDROMORPHONE HYDROCHLORIDE 1 MG: 0.5 INJECTION, SOLUTION INTRAMUSCULAR; INTRAVENOUS; SUBCUTANEOUS at 08:09

## 2024-02-16 RX ADMIN — ACETAMINOPHEN 325MG 650 MG: 325 TABLET ORAL at 21:27

## 2024-02-16 RX ADMIN — POTASSIUM & SODIUM PHOSPHATES POWDER PACK 280-160-250 MG 2 PACKET: 280-160-250 PACK at 22:52

## 2024-02-16 RX ADMIN — PIPERACILLIN SODIUM AND TAZOBACTAM SODIUM 3.38 G: 36; 4.5 INJECTION, POWDER, LYOPHILIZED, FOR SOLUTION INTRAVENOUS at 03:38

## 2024-02-16 RX ADMIN — LIOTHYRONINE SODIUM 5 MCG: 5 TABLET ORAL at 05:16

## 2024-02-16 RX ADMIN — HYDROMORPHONE HYDROCHLORIDE 0.5 MG: 1 INJECTION, SOLUTION INTRAMUSCULAR; INTRAVENOUS; SUBCUTANEOUS at 01:00

## 2024-02-16 RX ADMIN — DIPHENHYDRAMINE HYDROCHLORIDE 50 MG: 50 INJECTION, SOLUTION INTRAMUSCULAR; INTRAVENOUS at 18:48

## 2024-02-16 RX ADMIN — ONDANSETRON 4 MG: 2 INJECTION INTRAMUSCULAR; INTRAVENOUS at 14:33

## 2024-02-16 RX ADMIN — DIPHENHYDRAMINE HYDROCHLORIDE 50 MG: 50 INJECTION, SOLUTION INTRAMUSCULAR; INTRAVENOUS at 01:00

## 2024-02-16 RX ADMIN — SODIUM CHLORIDE 75 ML/HR: 0.9 INJECTION, SOLUTION INTRAVENOUS at 05:23

## 2024-02-16 RX ADMIN — DIPHENHYDRAMINE HYDROCHLORIDE 50 MG: 50 INJECTION, SOLUTION INTRAMUSCULAR; INTRAVENOUS at 10:28

## 2024-02-16 RX ADMIN — MIRTAZAPINE 15 MG: 15 TABLET, FILM COATED ORAL at 21:27

## 2024-02-16 RX ADMIN — HYDROMORPHONE HYDROCHLORIDE 0.5 MG: 1 INJECTION, SOLUTION INTRAMUSCULAR; INTRAVENOUS; SUBCUTANEOUS at 05:16

## 2024-02-16 RX ADMIN — OXYBUTYNIN CHLORIDE 5 MG: 5 TABLET ORAL at 20:05

## 2024-02-16 RX ADMIN — PIPERACILLIN SODIUM AND TAZOBACTAM SODIUM 3.38 G: 36; 4.5 INJECTION, POWDER, LYOPHILIZED, FOR SOLUTION INTRAVENOUS at 08:17

## 2024-02-16 RX ADMIN — HYDROMORPHONE HYDROCHLORIDE 0.5 MG: 1 INJECTION, SOLUTION INTRAMUSCULAR; INTRAVENOUS; SUBCUTANEOUS at 16:27

## 2024-02-16 RX ADMIN — POTASSIUM & SODIUM PHOSPHATES POWDER PACK 280-160-250 MG 2 PACKET: 280-160-250 PACK at 16:20

## 2024-02-16 RX ADMIN — LEVOTHYROXINE SODIUM 75 MCG: 75 TABLET ORAL at 05:16

## 2024-02-16 RX ADMIN — DIPHENHYDRAMINE HYDROCHLORIDE 50 MG: 50 INJECTION, SOLUTION INTRAMUSCULAR; INTRAVENOUS at 22:50

## 2024-02-16 RX ADMIN — HYDROMORPHONE HYDROCHLORIDE 1 MG: 0.5 INJECTION, SOLUTION INTRAMUSCULAR; INTRAVENOUS; SUBCUTANEOUS at 22:48

## 2024-02-16 RX ADMIN — FOLIC ACID 1 MG: 1 TABLET ORAL at 08:07

## 2024-02-16 RX ADMIN — HYDROMORPHONE HYDROCHLORIDE 0.5 MG: 1 INJECTION, SOLUTION INTRAMUSCULAR; INTRAVENOUS; SUBCUTANEOUS at 10:23

## 2024-02-16 RX ADMIN — HYDROMORPHONE HYDROCHLORIDE 0.5 MG: 1 INJECTION, SOLUTION INTRAMUSCULAR; INTRAVENOUS; SUBCUTANEOUS at 20:35

## 2024-02-16 RX ADMIN — HYDROMORPHONE HYDROCHLORIDE 1 MG: 0.5 INJECTION, SOLUTION INTRAMUSCULAR; INTRAVENOUS; SUBCUTANEOUS at 18:41

## 2024-02-16 RX ADMIN — DIPHENHYDRAMINE HYDROCHLORIDE 50 MG: 50 INJECTION, SOLUTION INTRAMUSCULAR; INTRAVENOUS at 14:33

## 2024-02-16 RX ADMIN — ACETAMINOPHEN 325MG 650 MG: 325 TABLET ORAL at 05:16

## 2024-02-16 RX ADMIN — HYDROMORPHONE HYDROCHLORIDE 1 MG: 0.5 INJECTION, SOLUTION INTRAMUSCULAR; INTRAVENOUS; SUBCUTANEOUS at 13:39

## 2024-02-16 RX ADMIN — DIPHENHYDRAMINE HYDROCHLORIDE 50 MG: 50 INJECTION, SOLUTION INTRAMUSCULAR; INTRAVENOUS at 05:16

## 2024-02-16 NOTE — ASSESSMENT & PLAN NOTE
Patient noted to have fever this admission along with tachycardia, tachypnea, and highly elevated white count  Sepsis alert called-2/15; patient started on Zosyn  Has been receiving IV fluids and patient reported history of heart failure (although recent echo does not demonstrate that)  Will hold off on early goal-directed fluid resuscitation for sepsis given the fact that she is already received fluids and will continue gentle IV hydration  Monitor on daily labs; CT abdomen pelvis ordered by GI who is following along  2/16-now afebrile X 24 hours; white count trending down to 13k; continue Zosyn and await further GI recommendations; patient also noted to have bacteriuria; lactic acid stable.  Blood cultures now 1/2 positive for Klebsiella oxytocin; discussed with ID; continue Zosyn until sensitivities result

## 2024-02-16 NOTE — ASSESSMENT & PLAN NOTE
Continue prior to admission medication  Continue outpatient immunology follow-up  Patient reports intolerance to all medications with corn.

## 2024-02-16 NOTE — ASSESSMENT & PLAN NOTE
Continue repletion  Monitor daily  Recent Labs     02/14/24  0452 02/15/24  0612   K 3.3* 3.6

## 2024-02-16 NOTE — PROGRESS NOTES
[facility] - Progress Note  Celine Roa 48 y.o. 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 S/p ERCP 2/14/24 with extension of sphincterotomy, balloon sweeps with removal of debris, and no residual defect seen on occlusion cholangiogram.     Cholangitis  S/p ERCP with removal of debris and extension of sphincterotomy 2/14/24, with transamianses improving  Based on blood culture susceptibilities, deescalate abx to ceftriaxone  Repeat blood cultures  Trend daily CBC, LFTs  ______________________________________________________________________    SUBJECTIVE:     LFTs improving ( -> 239,  -> 399), blood cultures with GNRs speciated to Klebsiella.     Scheduled Meds:  Current Facility-Administered Medications   Medication Dose Route Frequency Provider Last Rate    acetaminophen  650 mg Oral Q8H KARISSA Eagle Babin MD      butalbital-acetaminophen-caffeine  1 tablet Oral Q4H PRN Miles Holm, DO      diphenhydrAMINE  50 mg Intravenous Q4H PRN Miles Holm, DO      famotidine  40 mg Intravenous Q12H [facility] Ruben Guadarrama DO 40 mg (02/15/24 2136)    folic acid  1 mg Oral Daily Miles Holm, DO      HYDROmorphone  0.5 mg Intravenous Q4H PRN Elizabeth Bose PA-C      HYDROmorphone  1 mg Intravenous Q4H PRN Eagle Babin MD      hydrOXYzine HCL  15 mg Oral Daily Miles Holm, DO      levothyroxine  75 mcg Oral Every Other Day Miles Holm, DO      liothyronine  5 mcg Oral Daily Miles Holm, DO      loratadine  10 mg Oral Daily Miles Holm, DO      mirtazapine  15 mg Oral HS Miles Holm, DO      montelukast  10 mg Oral HS Miles Holm,       ondansetron  4 mg Intravenous Q6H PRN Miles Holm, DO       oxybutynin  5 mg Oral BID PRN Miles Armnado Holm, DO      piperacillin-tazobactam  3.375 g Intravenous Q6H Eagle Babin MD 3.375 g (02/16/24 0817)    potassium chloride  40 mEq Oral Once Ruben G Chirayath, DO      scopolamine  1 patch Transdermal Q72H Ruben G Chirayath, DO      sodium chloride  75 mL/hr Intravenous Continuous Eagle Babin MD 75 mL/hr (02/16/24 0523)     Continuous Infusions:sodium chloride, 75 mL/hr, Last Rate: 75 mL/hr (02/16/24 0523)      PRN Meds:.  butalbital-acetaminophen-caffeine    diphenhydrAMINE    HYDROmorphone    HYDROmorphone    ondansetron    oxybutynin    OBJECTIVE:     Objective   Blood pressure 95/56, pulse 63, temperature 98.1 °F (36.7 °C), resp. rate 15, weight 75 kg (165 lb 5.5 oz), SpO2 91%, not currently breastfeeding. Body mass index is 27.32 kg/m².    Intake/Output Summary (Last 24 hours) at 2/16/2024 0930  Last data filed at 2/15/2024 2016  Gross per 24 hour   Intake --   Output 2300 ml   Net -2300 ml       PHYSICAL EXAM:   General Appearance: Awake and alert, in no acute distress  Abdomen: Soft, non-tender, non-distended; no masses or no organomegaly    Invasive Devices       Peripheral Intravenous Line  Duration             Peripheral IV 02/15/24 Left;Ventral (anterior) Forearm <1 day              Line  Duration             Pump Device  -- days                    LAB RESULTS:      Lab Units 02/16/24  0609 02/15/24  0612 02/14/24  0452 02/13/24  0552 02/11/24  0545 02/09/24  0515 02/08/24  0552 02/07/24  1721 08/28/23  0921 08/11/23  0512 07/25/23 2016 07/25/23  1007 06/13/23  0622 06/12/23  1012   SODIUM mmol/L 139 137 141 141 138   < > 139 137   < > 141   < > 141   < > 137   POTASSIUM mmol/L 3.2* 3.6 3.3* 3.1* 3.4*   < > 4.1 4.2   < > 3.7   < > 2.7*   < > 3.6   CHLORIDE mmol/L 109* 104 107 108 107   < > 106 102   < > 104   < > 98   < > 98   CO2 mmol/L 25 22 27 26 23   < > 28 27   < > 28   < > 25   < > 28   BUN mg/dL 6 7 6 6 2*   < > 8 15   < > 10   < > 8   < > 14    CREATININE mg/dL 0.59* 0.76 0.71 0.63 0.63   < > 0.69 0.84   < > 0.70   < > 0.81   < > 0.74   GLUCOSE RANDOM mg/dL 76 104 71 102 96   < > 74 80   < > 88   < > 121   < > 161*   CALCIUM mg/dL 7.8* 8.0* 8.0* 7.7* 7.6*   < > 7.9* 9.2   < > 8.9   < > 9.8   < > 8.5   MAGNESIUM mg/dL 1.9  --   --  1.9  --   --  2.3 2.3  --  2.3   < > 2.1  --  2.0   PHOSPHORUS mg/dL 2.0*  --   --   --   --   --  2.6*  --   --  4.0  --  3.1  --  3.9    < > = values in this interval not displayed.            Lab Units 02/16/24  0609 02/15/24  0935 02/14/24 0452 02/13/24  0552 02/11/24  0545 09/02/23  0717 08/28/23  0921   TOTAL PROTEIN g/dL 5.1* 5.6* 5.5* 5.3* 5.8*   < > 7.1   ALBUMIN g/dL 3.0* 3.4* 3.3* 3.3* 3.6   < > 4.1   TOTAL BILIRUBIN mg/dL 2.43* 2.49* 0.21 0.29 0.44   < > 0.31   BILIRUBIN DIRECT mg/dL  --  1.86*  --   --   --   --  0.13   AST U/L 239* 677* 12* 11* 17   < > 22   ALT U/L 399* 577* 18 18 20   < > 26   ALK PHOS U/L 180* 213* 45 46 46   < > 56    < > = values in this interval not displayed.           Lab Units 02/16/24  0609 02/15/24  0612 02/14/24 0452 02/11/24  0545 02/09/24  0515   WBC Thousand/uL 13.00* 19.86* 14.43* 7.21 6.28   HEMOGLOBIN g/dL 12.7 13.8 12.8 14.1 13.9   HEMATOCRIT % 37.1 41.0 37.7 43.7 42.9   PLATELETS Thousands/uL 330 395* 418* 380 374   MCV fL 97 97 100* 100* 101*       Lab Results   Component Value Date    IRON 76 01/29/2024    TIBC 310 01/29/2024    FERRITIN 440 (H) 01/29/2024       Lab Results   Component Value Date    INR 1.00 02/07/2024    INR 0.91 09/02/2023    INR 0.86 08/28/2023    PROTIME 13.1 02/07/2024    PROTIME 12.2 09/02/2023    PROTIME 11.9 08/28/2023       RADIOLOGY RESULTS:   Procedure: CT abdomen pelvis w contrast    Result Date: 2/15/2024  Narrative: CT ABDOMEN AND PELVIS WITH IV CONTRAST INDICATION: Fever after ERCP, concern for infection.. COMPARISON: CT chest abdomen pelvis 2/7/2024. TECHNIQUE: CT examination of the abdomen and pelvis was performed. Multiplanar 2D  reformatted images were created from the source data. This examination, like all CT scans performed in the [facility], was performed utilizing techniques to minimize radiation dose exposure, including the use of iterative reconstruction and automated exposure control. Radiation dose length product (DLP) for this visit: 421 mGy-cm IV Contrast: 85 mL of iohexol (OMNIPAQUE) Enteric Contrast: Not administered. FINDINGS: ABDOMEN LOWER CHEST: No clinically significant abnormality in the visualized lower chest. LIVER/BILIARY TREE: Unchanged diffuse biliary ductal dilation now with pneumobilia in the setting of recent sphincterotomy. GALLBLADDER: Post cholecystectomy. SPLEEN: Unremarkable. PANCREAS: Unremarkable. ADRENAL GLANDS: Unremarkable. KIDNEYS/URETERS: Small bilateral nonobstructing nephrolithiasis. No hydronephrosis. STOMACH AND BOWEL: Unremarkable. APPENDIX: Surgically absent. ABDOMINOPELVIC CAVITY: No ascites. No pneumoperitoneum. No lymphadenopathy. VESSELS: Unremarkable for patient's age. PELVIS REPRODUCTIVE ORGANS: Post hysterectomy. URINARY BLADDER: Unremarkable. ABDOMINAL WALL/INGUINAL REGIONS: Unremarkable. BONES: No acute fracture or suspicious osseous lesion. Degenerative changes in the spine. Unchanged L3 superior end plate deformity.     Impression: No acute findings in the abdomen or pelvis. Pneumobilia in the setting of recent sphincterotomy. Workstation performed: [id number]     Procedure: FL ERCP biliary only    Result Date: 2/14/2024  Narrative: ERCP INDICATION: Right upper quadrant pain; biliary ductal dilatation COMPARISON: MRI 2/9/2024 IMAGES:  27 FLUOROSCOPY TIME:   5 min 4 sec CONTRAST: 12 mL of iohexol (OMNIPAQUE) FINDINGS: Fluoroscopic guidance was provided for performance of ERCP. BILIARY: Side-viewing endoscope in the duodenum noted. Common bile duct was cannulated and injected. Evidence for previous sphincterotomy. No filling defects noted. Surgically absent gallbladder.  Balloon sweeping removed sludge. Sphincterotomy extended. No filling defects noted on final injection.     Impression: No definite filling defects. Existing sphincterotomy extended and balloon sweeping removed small amount of sludge and no stones. Workstation performed: [id number]     Procedure: ERCP    Result Date: 2/14/2024  Narrative: Table formatting from the original result was not included. [facility] [address] [facility] [facility] [address] [phone] DATE OF SERVICE: 2/14/24 PHYSICIAN(S): Attending: Dre Soto MD Fellow: Ruben Guadarrama DO INDICATION: Intractable right upper quadrant abdominal pain POST-OP DIAGNOSIS: See the impression below. PREPROCEDURE: Informed consent was obtained for the procedure, including sedation.  Risks of perforation, hemorrhage, adverse drug reaction and aspiration were discussed. The patient was placed in the left lateral decubitus position. Patient was explained about the risks and benefits of the procedure. Risks including but not limited to bleeding, infection, and perforation were explained in detail. Also explained about less than 100% sensitivity with the exam and other alternatives. PROCEDURE: ERCP DETAILS OF PROCEDURE: Patient was taken to the procedure room where a time out was performed to confirm correct patient and correct procedure. The patient underwent general anesthesia, which was administered by an anesthesia professional. The patient's blood pressure, heart rate, level of consciousness, respirations, oxygen, ECG and ETCO2 were monitored throughout the procedure. The scope was advanced to the duodenum. Clinical intention was achieved. The patient experienced no blood loss. The procedure was not difficult. The patient tolerated the procedure well. There were no apparent adverse events. ANESTHESIA INFORMATION: ASA: II Anesthesia Type: General MEDICATIONS: iohexol (OMNIPAQUE) 300 mg/mL injection 12 mL 12 mL (Totals for administrations  "occurring from 1515 to 1704 on 02/14/24) FINDINGS: The esophagus appeared normal. Z-line is 38 cm from the incisors. Mild, patchy abnormal mucosa with erosion in the antrum Otherwise normal stomach. Biopsies done. The duodenal bulb and 2nd part of the duodenum appeared normal. Evidence of prior sphincterotomy at the ampulla. Prominent minor papilla. The common bile duct was deeply cannulated after 1 attempt using a traction sphincterotome with 260 cm x 0.035\" guidewire. Cannulation was not difficult. Sphincterotome changed to a 12-15mm extraction balloon. Cholangiogram obtained. Dilated bile duct on cholangiogram. No filling defects were seen. RUQ clips were seen. The fluoroscopy images were personally reviewed and interpreted during the procedure. Multiple sweeps were performed in the common bile duct using a 15 mm balloon. Sludge was removed, achieving complete clearance. There was moderate resistance to the passage of the 12 and 15mm balloon across the ampulla. The sphincterotomy was extended. Sweeps were again done and no further debris was removed. The balloon (12mm) passed easily across the ampulla. Occlusion cholangiogram was then obtained and no residual filling defect was seen. There was good bile drainage seen. SPECIMENS: ID Type Source Tests Collected by Time Destination 1 : hx of mast cell activation syndrome Tissue Duodenum TISSUE EXAM [facility] 2/14/2024  4:32 PM  2 : hx of mast cell activation syndrome Tissue Stomach TISSUE EXAM [facility] 2/14/2024  4:34 PM       Impression: Mild abnormal mucosa with erosion in the antrum. Otherwise normal. Biopsies done. Evidence of prior sphincterotomy at the ampulla. Prominent minor papilla. The sphincterotomy was extended. Balloon sweeps were done and slight debris was removed. The balloon (12mm) passed easily across the ampulla. Occlusion cholangiogram was then obtained and no residual filling defect was seen. There was good bile drainage " seen. RECOMMENDATION:  Await pathology results  Follow up with me in clinic Follow LFT's.  Dre Soto MD     Narrative/Impressions - 3 day look back     Shady Montano M.D.  PGY-5 Gastroenterology Fellow  [facility]  Available on [email or web address]  [email or web address]

## 2024-02-16 NOTE — ASSESSMENT & PLAN NOTE
Reports decreased urinary output and having to push to urinate  Bladder scans within normal limits, no evidence of hydroureteronephrosis on ultrasound  Valuated by urology at [facility] and placed on Ditropan  Will need outpatient urodynamic studies as well as discussion on further medical management

## 2024-02-16 NOTE — QUICK NOTE
Notified of critical lab result: Gram-negative bacteremia    BC 1 of 2 growing gram-negative rods  Per [facility] ID Klebsiella oxytoca detected.  Ceftriaxone recommended, await susceptibilities for further de-escalation  Patient on IV Zosyn, continue.  Follow blood cultures and susceptibilities

## 2024-02-16 NOTE — PROGRESS NOTES
[facility]  Progress Note  Name: Celine Roa I  MRN: [id number]  Unit/Bed#: [place] [address] -01 I Date of Admission: 2/13/2024   Date of Service: 2/16/2024 I Hospital Day: 3    Assessment/Plan   Sepsis without acute organ dysfunction (HCC)  Assessment & Plan  Patient noted to have fever this admission along with tachycardia, tachypnea, and highly elevated white count  Sepsis alert called-2/15; patient started on Zosyn  Has been receiving IV fluids and patient reported history of heart failure (although recent echo does not demonstrate that)  Will hold off on early goal-directed fluid resuscitation for sepsis given the fact that she is already received fluids and will continue gentle IV hydration  Monitor on daily labs; CT abdomen pelvis ordered by GI who is following along  2/16-now afebrile X 24 hours; white count trending down to 13k; continue Zosyn and await further GI recommendations; patient also noted to have bacteriuria; lactic acid stable.  Blood cultures now 1/2 positive for Klebsiella oxytocin; discussed with ID; continue Zosyn until sensitivities result    Bladder spasm  Assessment & Plan  Reports decreased urinary output and having to push to urinate  Bladder scans within normal limits, no evidence of hydroureteronephrosis on ultrasound  Valuated by urology at [facility] and placed on Ditropan  Will need outpatient urodynamic studies as well as discussion on further medical management    GERD without esophagitis  Assessment & Plan  Continue Pepcid    Crohn's colitis (HCC)  Assessment & Plan  History of Crohn's disease on Skyrizi as well as methotrexate  Continue GI follow-up  Is due on Thursday for methotrexate and receives  intravenously  Resume folic acid    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HCC)  Assessment & Plan  Continue prior to admission medication  Continue outpatient immunology follow-up  Patient reports intolerance to all medications with corn.      Acquired  hypothyroidism  Assessment & Plan  Prior to admission on Cytomel, resume substitute with levothyroxine    Hypokalemia  Assessment & Plan  Continue repletion  Monitor daily  Recent Labs     02/14/24  0452 02/15/24  0612   K 3.3* 3.6         * Abdominal pain  Assessment & Plan  Patient with history of Crohn's disease she is on Skyrizi as well as intravenous methotrexate which he takes on Thursday via infusion.  Given location of symptoms suspect that this is likely related to biliary source.  She did have stent placed in 2014.  Symptoms present since July.  Transferred for ERCP which was completed on 2/13; see separate report  Also with history of endometriosis, was reviewed with Dr. Jennings, he does not believe that this is related.  He recommends outpatient follow-up.  Patient will follow-up with Dr. Benito as previously instructed.  MRCP without any evidence of obvious obstruction, she is receiving significant amounts of opioids to control her pain  Has was n.p.o. since midnight on 2/12 for ERCP; briefly restarted on clears, but stated she had no appetite and was nauseous  Made n.p.o. on 2/15 when she was septic with biliary source  Patient requires IV pepcid and IV solumedrol prior to procedures, initiated on 2/11 at [facility]  Continue IV fluid hydration while n.p.o.    Monitor abdominal pain and treat with supportive care  LFTs highly elevated status post ERCP; GI following along  Given elevated white count, tenderness to palpation, and fever; Zosyn started concern for cholangitis  2/16-patient reports abdominal pain much better today.  White count and LFTs trending down dramatically.  Continue current antibiotic therapy.  Patient noted to have Klebsiella oxytocin bacteremia, discussed with ID, continue Zosyn at this time and await sensitivities.  Diet advanced to purée; continue supportive care.                 VTE Pharmacologic Prophylaxis: VTE Score: 2 Low Risk (Score 0-2) - Encourage  Ambulation.    Mobility:   Basic Mobility Inpatient Raw Score: 24  JH-HLM Goal: 8: Walk 250 feet or more  JH-HLM Achieved: 8: Walk 250 feet ot more  HLM Goal achieved. Continue to encourage appropriate mobility.    Patient Centered Rounds: I performed bedside rounds with nursing staff today.   Discussions with Specialists or Other Care Team Provider: Infectious disease    Education and Discussions with Family / Patient: Care plan discussed with patient who voiced understanding and agrees with recommendations.      Total Time Spent on Date of Encounter in care of patient: 45 mins. This time was spent on one or more of the following: performing physical exam; counseling and coordination of care; obtaining or reviewing history; documenting in the medical record; reviewing/ordering tests, medications or procedures; communicating with other healthcare professionals and discussing with patient's family/caregivers.    Current Length of Stay: 3 day(s)  Current Patient Status: Inpatient   Certification Statement: The patient will continue to require additional inpatient hospital stay due to bacteremia and sepsis  Discharge Plan: Anticipate discharge in 24-48 hrs to home.    Code Status: Level 1 - Full Code    Subjective:   Patient seen and examined bedside, appears much more comfortable on exam and reports abdominal pain is dramatically better than yesterday.  White count trended down from 19.86-13 K overnight, will continue supportive care and antibiotics.  Currently treating for possible cholangitis, UTI, and bacteremia with Klebsiella oxytocin.  I have discussed with ID, recommend continuing Zosyn given good result thus far and awaiting sensitivities to de-escalate.  Have started puréed diet as patient refused clear liquid for fear of allergies.  Repleted potassium with IV potassium as patient declined oral potassium repletion previously.     Objective:     Vitals:   Temp (24hrs), Av.4 °F (36.9 °C), Min:98 °F (36.7  °C), Max:99.4 °F (37.4 °C)    Temp:  [98 °F (36.7 °C)-99.4 °F (37.4 °C)] 99.4 °F (37.4 °C)  HR:  [56-95] 95  Resp:  [15-18] 17  BP: (91-95)/(54-58) 91/58  SpO2:  [91 %-95 %] 95 %  Body mass index is 27.32 kg/m².     Input and Output Summary (last 24 hours):     Intake/Output Summary (Last 24 hours) at 2/16/2024 1554  Last data filed at 2/15/2024 2016  Gross per 24 hour   Intake --   Output 1100 ml   Net -1100 ml       Physical Exam:   Physical Exam  Vitals and nursing note reviewed.   Constitutional:       General: She is not in acute distress.     Appearance: She is well-developed.   HENT:      Head: Normocephalic and atraumatic.   Eyes:      Conjunctiva/sclera: Conjunctivae normal.   Cardiovascular:      Rate and Rhythm: Normal rate.   Pulmonary:      Effort: Pulmonary effort is normal. No respiratory distress.   Abdominal:      General: There is no distension.      Palpations: Abdomen is soft.      Tenderness: There is no abdominal tenderness. There is no guarding.   Musculoskeletal:         General: No swelling.      Cervical back: Neck supple.   Skin:     General: Skin is warm and dry.   Neurological:      Mental Status: She is alert and oriented to person, place, and time.            Additional Data:     Labs:  Results from last 7 days   Lab Units 02/16/24  0609   WBC Thousand/uL 13.00*   HEMOGLOBIN g/dL 12.7   HEMATOCRIT % 37.1   PLATELETS Thousands/uL 330   NEUTROS PCT % 79*   LYMPHS PCT % 9*   MONOS PCT % 9   EOS PCT % 1     Results from last 7 days   Lab Units 02/16/24  0609   SODIUM mmol/L 139   POTASSIUM mmol/L 3.2*   CHLORIDE mmol/L 109*   CO2 mmol/L 25   BUN mg/dL 6   CREATININE mg/dL 0.59*   ANION GAP mmol/L 5   CALCIUM mg/dL 7.8*   ALBUMIN g/dL 3.0*   TOTAL BILIRUBIN mg/dL 2.43*   ALK PHOS U/L 180*   ALT U/L 399*   AST U/L 239*   GLUCOSE RANDOM mg/dL 76                 Results from last 7 days   Lab Units 02/15/24  0935   LACTIC ACID mmol/L 2.0       Lines/Drains:  Invasive Devices       Peripheral  Intravenous Line  Duration             Peripheral IV 02/15/24 Left;Ventral (anterior) Forearm <1 day              Line  Duration             Pump Device  -- days                          Imaging: No pertinent imaging reviewed.    Recent Cultures (last 7 days):   Results from last 7 days   Lab Units 02/15/24  0932   BLOOD CULTURE  No Growth at 24 hrs.   GRAM STAIN RESULT  Gram negative rods*       Last 24 Hours Medication List:   Current Facility-Administered Medications   Medication Dose Route Frequency Provider Last Rate    acetaminophen  650 mg Oral Q8H [facility] Eagle Babin MD      butalbital-acetaminophen-caffeine  1 tablet Oral Q4H PRN Miles Holm, DO      diphenhydrAMINE  50 mg Intravenous Q4H PRN Miles Holm, DO      famotidine  40 mg Oral BID Eagle Babin MD      folic acid  1 mg Oral Daily Miles Holm, DO      HYDROmorphone  0.5 mg Intravenous Q4H PRN Elizabeth Bose PA-C      HYDROmorphone  1 mg Intravenous Q4H PRN Eagle Babin MD      hydrOXYzine HCL  15 mg Oral Daily Miles Holm, DO      levothyroxine  75 mcg Oral Every Other Day Miles Holm, DO      liothyronine  5 mcg Oral Daily Miles Holm, DO      loratadine  10 mg Oral Daily Miles Holm, DO      mirtazapine  15 mg Oral HS Miles Holm, DO      montelukast  10 mg Oral HS Miles Holm, DO      ondansetron  4 mg Intravenous Q6H PRN Miles Holm, DO      oxybutynin  5 mg Oral BID PRN Miles Holm, DO      piperacillin-tazobactam  3.375 g Intravenous Q6H Eagle Babin MD 3.375 g (02/16/24 1535)    potassium chloride  20 mEq Intravenous Q2H Eagle Babin MD      scopolamine  1 patch Transdermal Q72H Ruben G Chirayath, DO      sodium chloride  75 mL/hr Intravenous Continuous Eagle Babin MD 75 mL/hr (02/16/24 0523)        Today, Patient Was Seen By: Eagle Babin MD    **Please Note: This note may have been constructed using a voice recognition system.**

## 2024-02-16 NOTE — PROGRESS NOTES
The famotidine has / have been converted to Oral per [facility] IV-to-PO Auto-Conversion Protocol for Adults as approved by the Pharmacy and Therapeutics Committee. The patient met all eligible criteria:  1) Age = 18 years old   2) Received at least one dose of the IV form   3) Receiving at least one other scheduled oral/enteral medication   4) Tolerating an oral/enteral diet   and did not have any exclusions:   1) Critical care patient   2) Active GI bleed (IF assessing H2RAs or PPIs)   3) Continuous tube feeding (IF assessing cipro, doxycycline, levofloxacin, minocycline, rifampin, or voriconazole)   4) Receiving PO vancomycin (IF assessing metronidazole)   5) Persistent nausea and/or vomiting   6) Ileus or gastrointestinal obstruction   7) Sumi/nasogastric tube set for continuous suction   8) Specific order not to automatically convert to PO (in the order's comments or if discussed in the most recent Infectious Disease or primary team's progress notes).

## 2024-02-16 NOTE — ASSESSMENT & PLAN NOTE
Patient with history of Crohn's disease she is on Skyrizi as well as intravenous methotrexate which he takes on Thursday via infusion.  Given location of symptoms suspect that this is likely related to biliary source.  She did have stent placed in 2014.  Symptoms present since July.  Transferred for ERCP which was completed on 2/13; see separate report  Also with history of endometriosis, was reviewed with Dr. Jennings, he does not believe that this is related.  He recommends outpatient follow-up.  Patient will follow-up with Dr. Benito as previously instructed.  MRCP without any evidence of obvious obstruction, she is receiving significant amounts of opioids to control her pain  Has was n.p.o. since midnight on 2/12 for ERCP; briefly restarted on clears, but stated she had no appetite and was nauseous  Made n.p.o. on 2/15 when she was septic with biliary source  Patient requires IV pepcid and IV solumedrol prior to procedures, initiated on 2/11 at [facility]  Continue IV fluid hydration while n.p.o.    Monitor abdominal pain and treat with supportive care  LFTs highly elevated status post ERCP; GI following along  Given elevated white count, tenderness to palpation, and fever; Zosyn started concern for cholangitis  2/16-patient reports abdominal pain much better today.  White count and LFTs trending down dramatically.  Continue current antibiotic therapy.  Patient noted to have Klebsiella oxytocin bacteremia, discussed with ID, continue Zosyn at this time and await sensitivities.  Diet advanced to purée; continue supportive care.

## 2024-02-17 LAB
ALBUMIN SERPL BCP-MCNC: 3 G/DL (ref 3.5–5)
ALP SERPL-CCNC: 184 U/L (ref 34–104)
ALT SERPL W P-5'-P-CCNC: 253 U/L (ref 7–52)
ANION GAP SERPL CALCULATED.3IONS-SCNC: 8 MMOL/L
AST SERPL W P-5'-P-CCNC: 87 U/L (ref 13–39)
BASOPHILS # BLD AUTO: 0.09 THOUSANDS/ÂΜL (ref 0–0.1)
BASOPHILS NFR BLD AUTO: 1 % (ref 0–1)
BILIRUB SERPL-MCNC: 2.02 MG/DL (ref 0.2–1)
BUN SERPL-MCNC: 2 MG/DL (ref 5–25)
CALCIUM ALBUM COR SERPL-MCNC: 8.3 MG/DL (ref 8.3–10.1)
CALCIUM SERPL-MCNC: 7.5 MG/DL (ref 8.4–10.2)
CHLORIDE SERPL-SCNC: 107 MMOL/L (ref 96–108)
CO2 SERPL-SCNC: 22 MMOL/L (ref 21–32)
CREAT SERPL-MCNC: 0.58 MG/DL (ref 0.6–1.3)
EOSINOPHIL # BLD AUTO: 0.22 THOUSAND/ÂΜL (ref 0–0.61)
EOSINOPHIL NFR BLD AUTO: 3 % (ref 0–6)
ERYTHROCYTE [DISTWIDTH] IN BLOOD BY AUTOMATED COUNT: 16 % (ref 11.6–15.1)
GFR SERPL CREATININE-BSD FRML MDRD: 107 ML/MIN/1.73SQ M
GLUCOSE SERPL-MCNC: 76 MG/DL (ref 65–140)
HCT VFR BLD AUTO: 39.8 % (ref 34.8–46.1)
HGB BLD-MCNC: 13.2 G/DL (ref 11.5–15.4)
IMM GRANULOCYTES # BLD AUTO: 0.06 THOUSAND/UL (ref 0–0.2)
IMM GRANULOCYTES NFR BLD AUTO: 1 % (ref 0–2)
LYMPHOCYTES # BLD AUTO: 1.17 THOUSANDS/ÂΜL (ref 0.6–4.47)
LYMPHOCYTES NFR BLD AUTO: 16 % (ref 14–44)
MAGNESIUM SERPL-MCNC: 1.9 MG/DL (ref 1.9–2.7)
MCH RBC QN AUTO: 32.8 PG (ref 26.8–34.3)
MCHC RBC AUTO-ENTMCNC: 33.2 G/DL (ref 31.4–37.4)
MCV RBC AUTO: 99 FL (ref 82–98)
MONOCYTES # BLD AUTO: 0.81 THOUSAND/ÂΜL (ref 0.17–1.22)
MONOCYTES NFR BLD AUTO: 11 % (ref 4–12)
NEUTROPHILS # BLD AUTO: 5.12 THOUSANDS/ÂΜL (ref 1.85–7.62)
NEUTS SEG NFR BLD AUTO: 68 % (ref 43–75)
NRBC BLD AUTO-RTO: 0 /100 WBCS
PHOSPHATE SERPL-MCNC: 2.8 MG/DL (ref 2.7–4.5)
PLATELET # BLD AUTO: 339 THOUSANDS/UL (ref 149–390)
PMV BLD AUTO: 9.2 FL (ref 8.9–12.7)
POTASSIUM SERPL-SCNC: 3.7 MMOL/L (ref 3.5–5.3)
PROT SERPL-MCNC: 5.4 G/DL (ref 6.4–8.4)
RBC # BLD AUTO: 4.02 MILLION/UL (ref 3.81–5.12)
SODIUM SERPL-SCNC: 137 MMOL/L (ref 135–147)
WBC # BLD AUTO: 7.47 THOUSAND/UL (ref 4.31–10.16)

## 2024-02-17 PROCEDURE — 85025 COMPLETE CBC W/AUTO DIFF WBC: CPT | Performed by: INTERNAL MEDICINE

## 2024-02-17 PROCEDURE — 80053 COMPREHEN METABOLIC PANEL: CPT | Performed by: INTERNAL MEDICINE

## 2024-02-17 PROCEDURE — 83735 ASSAY OF MAGNESIUM: CPT | Performed by: INTERNAL MEDICINE

## 2024-02-17 PROCEDURE — 84100 ASSAY OF PHOSPHORUS: CPT | Performed by: INTERNAL MEDICINE

## 2024-02-17 PROCEDURE — 99232 SBSQ HOSP IP/OBS MODERATE 35: CPT | Performed by: INTERNAL MEDICINE

## 2024-02-17 RX ORDER — METHYLPREDNISOLONE SODIUM SUCCINATE 40 MG/ML
40 INJECTION, POWDER, LYOPHILIZED, FOR SOLUTION INTRAMUSCULAR; INTRAVENOUS DAILY
Status: DISCONTINUED | OUTPATIENT
Start: 2024-02-17 | End: 2024-02-21

## 2024-02-17 RX ADMIN — DIPHENHYDRAMINE HYDROCHLORIDE 50 MG: 50 INJECTION, SOLUTION INTRAMUSCULAR; INTRAVENOUS at 07:50

## 2024-02-17 RX ADMIN — LIOTHYRONINE SODIUM 5 MCG: 5 TABLET ORAL at 05:59

## 2024-02-17 RX ADMIN — ACETAMINOPHEN 325MG 650 MG: 325 TABLET ORAL at 15:00

## 2024-02-17 RX ADMIN — PIPERACILLIN SODIUM AND TAZOBACTAM SODIUM 3.38 G: 36; 4.5 INJECTION, POWDER, LYOPHILIZED, FOR SOLUTION INTRAVENOUS at 16:00

## 2024-02-17 RX ADMIN — FAMOTIDINE 40 MG: 20 TABLET ORAL at 10:03

## 2024-02-17 RX ADMIN — FAMOTIDINE 40 MG: 20 TABLET ORAL at 18:19

## 2024-02-17 RX ADMIN — PIPERACILLIN SODIUM AND TAZOBACTAM SODIUM 3.38 G: 36; 4.5 INJECTION, POWDER, LYOPHILIZED, FOR SOLUTION INTRAVENOUS at 03:55

## 2024-02-17 RX ADMIN — METHYLPREDNISOLONE SODIUM SUCCINATE 40 MG: 40 INJECTION, POWDER, FOR SOLUTION INTRAMUSCULAR; INTRAVENOUS at 12:15

## 2024-02-17 RX ADMIN — HYDROMORPHONE HYDROCHLORIDE 1 MG: 0.5 INJECTION, SOLUTION INTRAMUSCULAR; INTRAVENOUS; SUBCUTANEOUS at 20:16

## 2024-02-17 RX ADMIN — DIPHENHYDRAMINE HYDROCHLORIDE 50 MG: 50 INJECTION, SOLUTION INTRAMUSCULAR; INTRAVENOUS at 22:23

## 2024-02-17 RX ADMIN — HYDROMORPHONE HYDROCHLORIDE 0.5 MG: 1 INJECTION, SOLUTION INTRAMUSCULAR; INTRAVENOUS; SUBCUTANEOUS at 22:23

## 2024-02-17 RX ADMIN — ACETAMINOPHEN 325MG 650 MG: 325 TABLET ORAL at 21:18

## 2024-02-17 RX ADMIN — DIPHENHYDRAMINE HYDROCHLORIDE 50 MG: 50 INJECTION, SOLUTION INTRAMUSCULAR; INTRAVENOUS at 12:49

## 2024-02-17 RX ADMIN — HYDROMORPHONE HYDROCHLORIDE 1 MG: 0.5 INJECTION, SOLUTION INTRAMUSCULAR; INTRAVENOUS; SUBCUTANEOUS at 10:03

## 2024-02-17 RX ADMIN — FOLIC ACID 1 MG: 1 TABLET ORAL at 10:03

## 2024-02-17 RX ADMIN — LORATADINE 10 MG: 10 TABLET ORAL at 10:03

## 2024-02-17 RX ADMIN — ONDANSETRON 4 MG: 2 INJECTION INTRAMUSCULAR; INTRAVENOUS at 18:19

## 2024-02-17 RX ADMIN — HYDROMORPHONE HYDROCHLORIDE 0.5 MG: 1 INJECTION, SOLUTION INTRAMUSCULAR; INTRAVENOUS; SUBCUTANEOUS at 04:02

## 2024-02-17 RX ADMIN — HYDROMORPHONE HYDROCHLORIDE 0.5 MG: 1 INJECTION, SOLUTION INTRAMUSCULAR; INTRAVENOUS; SUBCUTANEOUS at 18:19

## 2024-02-17 RX ADMIN — POTASSIUM & SODIUM PHOSPHATES POWDER PACK 280-160-250 MG 2 PACKET: 280-160-250 PACK at 10:03

## 2024-02-17 RX ADMIN — DIPHENHYDRAMINE HYDROCHLORIDE 50 MG: 50 INJECTION, SOLUTION INTRAMUSCULAR; INTRAVENOUS at 04:02

## 2024-02-17 RX ADMIN — HYDROMORPHONE HYDROCHLORIDE 0.5 MG: 1 INJECTION, SOLUTION INTRAMUSCULAR; INTRAVENOUS; SUBCUTANEOUS at 07:50

## 2024-02-17 RX ADMIN — PIPERACILLIN SODIUM AND TAZOBACTAM SODIUM 3.38 G: 36; 4.5 INJECTION, POWDER, LYOPHILIZED, FOR SOLUTION INTRAVENOUS at 10:00

## 2024-02-17 RX ADMIN — DIPHENHYDRAMINE HYDROCHLORIDE 50 MG: 50 INJECTION, SOLUTION INTRAMUSCULAR; INTRAVENOUS at 18:19

## 2024-02-17 RX ADMIN — PIPERACILLIN SODIUM AND TAZOBACTAM SODIUM 3.38 G: 36; 4.5 INJECTION, POWDER, LYOPHILIZED, FOR SOLUTION INTRAVENOUS at 21:18

## 2024-02-17 RX ADMIN — MIRTAZAPINE 15 MG: 15 TABLET, FILM COATED ORAL at 21:18

## 2024-02-17 RX ADMIN — MONTELUKAST 10 MG: 10 TABLET, FILM COATED ORAL at 21:18

## 2024-02-17 RX ADMIN — HYDROMORPHONE HYDROCHLORIDE 0.5 MG: 1 INJECTION, SOLUTION INTRAMUSCULAR; INTRAVENOUS; SUBCUTANEOUS at 12:49

## 2024-02-17 RX ADMIN — ACETAMINOPHEN 325MG 650 MG: 325 TABLET ORAL at 05:59

## 2024-02-17 RX ADMIN — ONDANSETRON 4 MG: 2 INJECTION INTRAMUSCULAR; INTRAVENOUS at 10:03

## 2024-02-17 NOTE — PROGRESS NOTES
Pt tolerated hydration without incident  Pt declined AVS, aware of future appts  - continue insulin sliding scale A1c 7.5. FSG in 200-260s.   - C/w Lantus 10U at bedtime, c/w Lispro 2U with meals  - C/w MDSS

## 2024-02-17 NOTE — CASE MANAGEMENT
Case Management Assessment & Discharge Planning Note    Patient name Celine Roa  Location [facility] 2 /[facility] 2 M* MRN [id number]  : 1975 Date 2024       Current Admission Date: 2024  Current Admission Diagnosis:Abdominal pain   Patient Active Problem List    Diagnosis Date Noted    Sepsis without acute organ dysfunction ([facility]) 02/15/2024    Bladder spasm 02/10/2024    Intractable right upper quadrant abdominal pain 2024    Common variable immunodeficiency (HCC) 2024    Diarrhea due to drug 2024    Iron deficiency 2023    Iron deficiency anemia due to chronic blood loss 2023    Depression due to physical illness 2023    Circadian rhythm sleep disorder 2023    Insomnia due to medical condition 2023    Recurrent major depressive disorder, in partial remission ([facility]) 2023    Medical clearance for psychiatric admission 2023    Seasonal allergies 2023    GERD without esophagitis 2023    Major depressive disorder with psychotic features ([facility]) 2023    Benzodiazepine misuse 2023    Passive suicidal ideations 2023    Encephalopathy chronic 2023    Systemic lupus erythematosus, unspecified SLE type, unspecified organ involvement status ([facility]) 2023    Bladder tumor 2023    Seizure ([facility])     Immunosuppression due to chronic steroid use  2022    Atrial tachycardia 2022    Nephrolithiasis 2021    Anaphylactic reaction 2021    Intractable nausea and vomiting 2021    Heart palpitations 2021    Inflammatory bowel disease 2021    Current chronic use of systemic steroids 2021    MS (multiple sclerosis) ([facility]) 2021    Migraine     Overweight (BMI 25.0-29.9) 2021    Anorexia 10/17/2020    Crohn's colitis (HCC) 10/17/2020    Mild protein-calorie malnutrition ([facility]) 2019    Constipation 09/10/2019    Mass of left axilla 2019     Immunosuppressed status (HCC) 08/27/2019    Proctitis 08/26/2019    Essential (hemorrhagic) thrombocythemia ([facility])     Thrush 02/12/2019    Chronic back pain 02/12/2019    Primary localized osteoarthrosis of ankle and foot 06/04/2018    Fibromyalgia 12/28/2017    Meniere's disease 11/29/2017    Leukocytosis 09/10/2017    Hypomagnesemia 08/26/2017    Generalized anxiety disorder 08/26/2017    Vomiting and diarrhea 08/25/2017    SIRS (systemic inflammatory response syndrome) ([facility]) 08/25/2017    Vitamin D deficiency 08/25/2017    Hypokalemia 08/25/2017    Throat tightness 08/25/2017    Abdominal pain 08/25/2017    Disorder of synovium 06/23/2017    Chondromalacia 05/12/2017    Chronic idiopathic urticaria 05/02/2017    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[facility]) 05/19/2016    Acquired hypothyroidism 01/13/2014      LOS (days): 4  Geometric Mean LOS (GMLOS) (days): 3.1  Days to GMLOS:-0.6     OBJECTIVE:    Risk of Unplanned Readmission Score: 32.93         Current admission status: Inpatient  Preferred Pharmacy:   [facility] [address]  [address]  [facility] [address]  Phone: [phone] Fax: [phone]    [facility] [place] - [address] [place] [address]  Phone: [phone] Fax: [phone]    [place] [address]  Phone: [phone] Fax: [phone]    Primary Care Provider: Olaf Rhodes MD    Primary Insurance: [employer]  Secondary Insurance: MEDICARE    ASSESSMENT:  Active Health Care Proxies       Armando Roa Health Care Representative - Spouse   Primary Phone: [phone] (Mobile)  Home Phone: [phone]            Readmission Root Cause  30 Day Readmission: No    Patient Information  Admitted from:: Home  Mental Status: Alert  During Assessment patient was accompanied by: Not accompanied during  assessment  Assessment information provided by:: Patient  Primary Caregiver: Self  Support Systems: Self, Spouse/significant other  County of Residence: [facility]  What city do you live in?: [place]  Home entry access options. Select all that apply.: Stairs  Number of steps to enter home.: 10  Do the steps have railings?: Yes  Type of Current Residence: [facility]  Living Arrangements: Lives w/ Spouse/significant other  Is patient a ?: No    Activities of Daily Living Prior to Admission  Functional Status: Independent  Completes ADLs independently?: Yes  Ambulates independently?: Yes  Does patient use assisted devices?: No  Does patient currently own DME?: No  Does patient have a history of Outpatient Therapy (PT/OT)?: No  Does the patient have a history of Short-Term Rehab?: No  Does patient have a history of HHC?: No  Does patient currently have HHC?: No    Patient Information Continued  Income Source: SSI/SSD  Does patient have prescription coverage?: Yes  Does patient receive dialysis treatments?: No  Does patient have a history of substance abuse?: No  Does patient have a history of Mental Health Diagnosis?: Yes (Depression)  Is patient receiving treatment for mental health?: No. Patient declined treatment information.  Has patient received inpatient treatment related to mental health in the last 2 years?: Yes [facility]    PHQ 2/9 Screening   Reviewed PHQ 2/9 Depression Screening Score?: No    Means of Transportation  Means of Transport to Appts:: Family transport      Social Determinants of Health (SDOH)      Flowsheet Row Most Recent Value   Housing Stability    In the last 12 months, was there a time when you were not able to pay the mortgage or rent on time? N   In the last 12 months, how many places have you lived? 1   In the last 12 months, was there a time when you did not have a steady place to sleep or slept in a shelter (including now)? N   Transportation Needs    In the  past 12 months, has lack of transportation kept you from medical appointments or from getting medications? no   In the past 12 months, has lack of transportation kept you from meetings, work, or from getting things needed for daily living? No   Food Insecurity    Within the past 12 months, you worried that your food would run out before you got the money to buy more. Never true   Within the past 12 months, the food you bought just didn't last and you didn't have money to get more. Never true   Utilities    In the past 12 months has the electric, gas, oil, or water company threatened to shut off services in your home? No            DISCHARGE DETAILS:    Discharge planning discussed with:: Patient  Freedom of Choice: Yes     CM contacted family/caregiver?: No- see comments (declined call at this time.)  Were Treatment Team discharge recommendations reviewed with patient/caregiver?: Yes  Did patient/caregiver verbalize understanding of patient care needs?: Yes  Were patient/caregiver advised of the risks associated with not following Treatment Team discharge recommendations?: Yes    Requested Home Health Care         Is the patient interested in HHC at discharge?: No    DME Referral Provided  Referral made for DME?: No    Other Referral/Resources/Interventions Provided:  Interventions: None Indicated    Would you like to participate in our [facility] service program?  : No - Declined    Treatment Team Recommendation: Home  Discharge Destination Plan:: Home  Transport at Discharge : Family

## 2024-02-17 NOTE — ASSESSMENT & PLAN NOTE
Patient with history of Crohn's disease she is on Skyrizi as well as intravenous methotrexate which he takes on Thursday via infusion.  Given location of symptoms suspect that this is likely related to biliary source.  She did have stent placed in 2014.  Symptoms present since July.  Transferred for ERCP which was completed on 2/13; see separate report  Also with history of endometriosis, was reviewed with Dr. Jennings, he does not believe that this is related.  He recommends outpatient follow-up.  Patient will follow-up with Dr. Benito as previously instructed.  MRCP without any evidence of obvious obstruction, she is receiving significant amounts of opioids to control her pain  Has was n.p.o. since midnight on 2/12 for ERCP; briefly restarted on clears, but stated she had no appetite and was nauseous  Made n.p.o. on 2/15 when she was septic with biliary source  Patient requires IV pepcid and IV solumedrol prior to procedures, initiated on 2/11 at [facility]  Continue IV fluid hydration while n.p.o.    Monitor abdominal pain and treat with supportive care  LFTs highly elevated status post ERCP; GI following along  Given elevated white count, tenderness to palpation, and fever; Zosyn started concern for cholangitis  2/16-patient reports abdominal pain much better today.  White count and LFTs trending down dramatically.  Continue current antibiotic therapy.  Patient noted to have Klebsiella oxytocin bacteremia, discussed with ID, continue Zosyn at this time and await sensitivities.  Diet advanced to purée; continue supportive care.  2/17-abdominal pain continues to improve; as well as transaminitis.  GI advance diet to level 2.  Patient also restarted on steroids by GI, monitor on morning labs.

## 2024-02-17 NOTE — PROGRESS NOTES
[facility]  Progress Note  Name: Celine Roa I  MRN: [id number]  Unit/Bed#: [place] [address] -01 I Date of Admission: 2/13/2024   Date of Service: 2/17/2024 I Hospital Day: 4    Assessment/Plan   Sepsis without acute organ dysfunction (HCC)  Assessment & Plan  Patient noted to have fever this admission along with tachycardia, tachypnea, and highly elevated white count  Sepsis alert called-2/15; patient started on Zosyn  Has been receiving IV fluids and patient reported history of heart failure (although recent echo does not demonstrate that)  Will hold off on early goal-directed fluid resuscitation for sepsis given the fact that she is already received fluids and will continue gentle IV hydration  Monitor on daily labs; CT abdomen pelvis ordered by GI who is following along  2/16-now afebrile X 24 hours; white count trending down to 13k; continue Zosyn and await further GI recommendations; patient also noted to have bacteriuria; lactic acid stable.  Blood cultures now 1/2 positive for Klebsiella oxytocin; discussed with ID; continue Zosyn until sensitivities result    Bladder spasm  Assessment & Plan  Reports decreased urinary output and having to push to urinate  Bladder scans within normal limits, no evidence of hydroureteronephrosis on ultrasound  Valuated by urology at [facility] and placed on Ditropan  Will need outpatient urodynamic studies as well as discussion on further medical management    GERD without esophagitis  Assessment & Plan  Continue Pepcid    Crohn's colitis (HCC)  Assessment & Plan  History of Crohn's disease on Skyrizi as well as methotrexate  Continue GI follow-up  Is due on Thursday for methotrexate and receives  intravenously  Resume folic acid    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HCC)  Assessment & Plan  Continue prior to admission medication  Continue outpatient immunology follow-up  Patient reports intolerance to all medications with corn.      Acquired  hypothyroidism  Assessment & Plan  Prior to admission on Cytomel, resume substitute with levothyroxine    Hypokalemia  Assessment & Plan  Continue repletion  Monitor daily  Recent Labs     02/15/24  0612 02/16/24  0609 02/17/24  0535   K 3.6 3.2* 3.7         * Abdominal pain  Assessment & Plan  Patient with history of Crohn's disease she is on Skyrizi as well as intravenous methotrexate which he takes on Thursday via infusion.  Given location of symptoms suspect that this is likely related to biliary source.  She did have stent placed in 2014.  Symptoms present since July.  Transferred for ERCP which was completed on 2/13; see separate report  Also with history of endometriosis, was reviewed with Dr. Jennings, he does not believe that this is related.  He recommends outpatient follow-up.  Patient will follow-up with Dr. Benito as previously instructed.  MRCP without any evidence of obvious obstruction, she is receiving significant amounts of opioids to control her pain  Has was n.p.o. since midnight on 2/12 for ERCP; briefly restarted on clears, but stated she had no appetite and was nauseous  Made n.p.o. on 2/15 when she was septic with biliary source  Patient requires IV pepcid and IV solumedrol prior to procedures, initiated on 2/11 at [facility]  Continue IV fluid hydration while n.p.o.    Monitor abdominal pain and treat with supportive care  LFTs highly elevated status post ERCP; GI following along  Given elevated white count, tenderness to palpation, and fever; Zosyn started concern for cholangitis  2/16-patient reports abdominal pain much better today.  White count and LFTs trending down dramatically.  Continue current antibiotic therapy.  Patient noted to have Klebsiella oxytocin bacteremia, discussed with ID, continue Zosyn at this time and await sensitivities.  Diet advanced to purée; continue supportive care.  2/17-abdominal pain continues to improve; as well as transaminitis.  GI advance diet to  level 2.  Patient also restarted on steroids by GI, monitor on morning labs.                 VTE Pharmacologic Prophylaxis: VTE Score: 2 Low Risk (Score 0-2) - Encourage Ambulation.    Mobility:   Basic Mobility Inpatient Raw Score: 24  JH-HLM Goal: 8: Walk 250 feet or more  JH-HLM Achieved: 8: Walk 250 feet ot more  HLM Goal achieved. Continue to encourage appropriate mobility.    Patient Centered Rounds: I performed bedside rounds with nursing staff today.   Discussions with Specialists or Other Care Team Provider: Gastroenterology    Education and Discussions with Family / Patient: Care plan discussed with patient who voiced understanding and agrees with recommendations.      Total Time Spent on Date of Encounter in care of patient: 45 mins. This time was spent on one or more of the following: performing physical exam; counseling and coordination of care; obtaining or reviewing history; documenting in the medical record; reviewing/ordering tests, medications or procedures; communicating with other healthcare professionals and discussing with patient's family/caregivers.    Current Length of Stay: 4 day(s)  Current Patient Status: Inpatient   Certification Statement: The patient will continue to require additional inpatient hospital stay due to treatment of bacteremia  Discharge Plan: Anticipate discharge in 24-48 hrs to home.    Code Status: Level 1 - Full Code    Subjective:   Patient seen and examined bedside, no acute distress reports pain is much better controlled.  Steroids and diet both changed by GI.  Transaminitis improving.  Await sensitivities and tailor antibiotics accordingly.  Monitor on morning labs.    Objective:     Vitals:   Temp (24hrs), Av.2 °F (37.9 °C), Min:100.2 °F (37.9 °C), Max:100.2 °F (37.9 °C)    Temp:  [100.2 °F (37.9 °C)] 100.2 °F (37.9 °C)  HR:  [74-77] 77  Resp:  [14-20] 14  BP: (93)/(56-60) 93/56  SpO2:  [93 %] 93 %  Body mass index is 27.53 kg/m².     Input and Output Summary  (last 24 hours):     Intake/Output Summary (Last 24 hours) at 2/17/2024 1821  Last data filed at 2/17/2024 1249  Gross per 24 hour   Intake 480 ml   Output --   Net 480 ml       Physical Exam:   Physical Exam  Vitals and nursing note reviewed.   Constitutional:       General: She is not in acute distress.     Appearance: She is well-developed.   HENT:      Head: Normocephalic and atraumatic.   Eyes:      Conjunctiva/sclera: Conjunctivae normal.   Cardiovascular:      Rate and Rhythm: Normal rate.   Pulmonary:      Effort: Pulmonary effort is normal. No respiratory distress.   Abdominal:      General: There is no distension.      Palpations: Abdomen is soft.      Tenderness: There is no abdominal tenderness. There is no guarding.   Musculoskeletal:         General: No swelling.      Cervical back: Neck supple.   Skin:     General: Skin is warm and dry.   Neurological:      Mental Status: She is alert and oriented to person, place, and time.            Additional Data:     Labs:  Results from last 7 days   Lab Units 02/17/24  0535   WBC Thousand/uL 7.47   HEMOGLOBIN g/dL 13.2   HEMATOCRIT % 39.8   PLATELETS Thousands/uL 339   NEUTROS PCT % 68   LYMPHS PCT % 16   MONOS PCT % 11   EOS PCT % 3     Results from last 7 days   Lab Units 02/17/24  0535   SODIUM mmol/L 137   POTASSIUM mmol/L 3.7   CHLORIDE mmol/L 107   CO2 mmol/L 22   BUN mg/dL 2*   CREATININE mg/dL 0.58*   ANION GAP mmol/L 8   CALCIUM mg/dL 7.5*   ALBUMIN g/dL 3.0*   TOTAL BILIRUBIN mg/dL 2.02*   ALK PHOS U/L 184*   ALT U/L 253*   AST U/L 87*   GLUCOSE RANDOM mg/dL 76                 Results from last 7 days   Lab Units 02/15/24  0935   LACTIC ACID mmol/L 2.0       Lines/Drains:  Invasive Devices       Peripheral Intravenous Line  Duration             Peripheral IV 02/15/24 Left;Ventral (anterior) Forearm 2 days              Line  Duration             Pump Device  -- days                          Imaging: No pertinent imaging reviewed.    Recent Cultures  (last 7 days):   Results from last 7 days   Lab Units 02/16/24  1117 02/15/24  0932   BLOOD CULTURE  No Growth at 24 hrs.  No Growth at 24 hrs. No Growth at 48 hrs.  Klebsiella oxytoca*   GRAM STAIN RESULT   --  Gram negative rods*       Last 24 Hours Medication List:   Current Facility-Administered Medications   Medication Dose Route Frequency Provider Last Rate    acetaminophen  650 mg Oral Q8H [facility] Eagle Babin MD      butalbital-acetaminophen-caffeine  1 tablet Oral Q4H PRN Miles Holm, DO      diphenhydrAMINE  50 mg Intravenous Q4H PRN Miles Holm, DO      famotidine  40 mg Oral BID Eagle Babin MD      folic acid  1 mg Oral Daily Miles Holm, DO      HYDROmorphone  0.5 mg Intravenous Q4H PRN Elizabeth Bose PA-C      HYDROmorphone  1 mg Intravenous Q4H PRN Eagle Babin MD      hydrOXYzine HCL  15 mg Oral Daily Miles Holm, DO      levothyroxine  75 mcg Oral Every Other Day Miles Holm, DO      liothyronine  5 mcg Oral Daily Miles Holm, DO      loratadine  10 mg Oral Daily Miles Holm, DO      methylPREDNISolone sodium succinate  40 mg Intravenous Daily Billy Loredo MD      mirtazapine  15 mg Oral HS Miles Holm, DO      montelukast  10 mg Oral HS Miles Holm, DO      ondansetron  4 mg Intravenous Q6H PRN Miles Holm, DO      oxybutynin  5 mg Oral BID PRN Miles Holm, DO      piperacillin-tazobactam  3.375 g Intravenous Q6H Eagle Babin MD 3.375 g (02/17/24 1600)    scopolamine  1 patch Transdermal Q72H Ruben G DO Thierry          Today, Patient Was Seen By: Eagle Babin MD    **Please Note: This note may have been constructed using a voice recognition system.**

## 2024-02-17 NOTE — ASSESSMENT & PLAN NOTE
Reports decreased urinary output and having to push to urinate  Bladder scans within normal limits, no evidence of hydroureteronephrosis on ultrasound  Valuated by urology at [facility] and placed on Ditropan  Will need outpatient urodynamic studies as well as discussion on further medical management

## 2024-02-17 NOTE — ASSESSMENT & PLAN NOTE
Continue repletion  Monitor daily  Recent Labs     02/15/24  0612 02/16/24  0609 02/17/24  0535   K 3.6 3.2* 3.7

## 2024-02-18 LAB
ALBUMIN SERPL BCP-MCNC: 3.1 G/DL (ref 3.5–5)
ALP SERPL-CCNC: 175 U/L (ref 34–104)
ALT SERPL W P-5'-P-CCNC: 179 U/L (ref 7–52)
ANION GAP SERPL CALCULATED.3IONS-SCNC: 7 MMOL/L
AST SERPL W P-5'-P-CCNC: 36 U/L (ref 13–39)
BACTERIA BLD CULT: ABNORMAL
BASOPHILS # BLD AUTO: 0.04 THOUSANDS/ÂΜL (ref 0–0.1)
BASOPHILS NFR BLD AUTO: 0 % (ref 0–1)
BILIRUB SERPL-MCNC: 1.12 MG/DL (ref 0.2–1)
BUN SERPL-MCNC: 6 MG/DL (ref 5–25)
CALCIUM ALBUM COR SERPL-MCNC: 9 MG/DL (ref 8.3–10.1)
CALCIUM SERPL-MCNC: 8.3 MG/DL (ref 8.4–10.2)
CHLORIDE SERPL-SCNC: 105 MMOL/L (ref 96–108)
CO2 SERPL-SCNC: 26 MMOL/L (ref 21–32)
CREAT SERPL-MCNC: 0.67 MG/DL (ref 0.6–1.3)
EOSINOPHIL # BLD AUTO: 0.02 THOUSAND/ÂΜL (ref 0–0.61)
EOSINOPHIL NFR BLD AUTO: 0 % (ref 0–6)
ERYTHROCYTE [DISTWIDTH] IN BLOOD BY AUTOMATED COUNT: 15.6 % (ref 11.6–15.1)
GFR SERPL CREATININE-BSD FRML MDRD: 102 ML/MIN/1.73SQ M
GLUCOSE SERPL-MCNC: 173 MG/DL (ref 65–140)
GRAM STN SPEC: ABNORMAL
HCT VFR BLD AUTO: 37.8 % (ref 34.8–46.1)
HGB BLD-MCNC: 12.9 G/DL (ref 11.5–15.4)
IMM GRANULOCYTES # BLD AUTO: 0.1 THOUSAND/UL (ref 0–0.2)
IMM GRANULOCYTES NFR BLD AUTO: 1 % (ref 0–2)
K OXYTOCA DNA BLD POS QL NAA+NON-PROBE: DETECTED
LYMPHOCYTES # BLD AUTO: 0.85 THOUSANDS/ÂΜL (ref 0.6–4.47)
LYMPHOCYTES NFR BLD AUTO: 7 % (ref 14–44)
MAGNESIUM SERPL-MCNC: 1.9 MG/DL (ref 1.9–2.7)
MCH RBC QN AUTO: 32.9 PG (ref 26.8–34.3)
MCHC RBC AUTO-ENTMCNC: 34.1 G/DL (ref 31.4–37.4)
MCV RBC AUTO: 96 FL (ref 82–98)
MONOCYTES # BLD AUTO: 0.89 THOUSAND/ÂΜL (ref 0.17–1.22)
MONOCYTES NFR BLD AUTO: 8 % (ref 4–12)
NEUTROPHILS # BLD AUTO: 9.81 THOUSANDS/ÂΜL (ref 1.85–7.62)
NEUTS SEG NFR BLD AUTO: 84 % (ref 43–75)
NRBC BLD AUTO-RTO: 1 /100 WBCS
PHOSPHATE SERPL-MCNC: 2.7 MG/DL (ref 2.7–4.5)
PLATELET # BLD AUTO: 355 THOUSANDS/UL (ref 149–390)
PMV BLD AUTO: 9.5 FL (ref 8.9–12.7)
POTASSIUM SERPL-SCNC: 3.6 MMOL/L (ref 3.5–5.3)
PROT SERPL-MCNC: 5.5 G/DL (ref 6.4–8.4)
RBC # BLD AUTO: 3.92 MILLION/UL (ref 3.81–5.12)
SODIUM SERPL-SCNC: 138 MMOL/L (ref 135–147)
WBC # BLD AUTO: 11.71 THOUSAND/UL (ref 4.31–10.16)

## 2024-02-18 PROCEDURE — 80053 COMPREHEN METABOLIC PANEL: CPT | Performed by: INTERNAL MEDICINE

## 2024-02-18 PROCEDURE — 99232 SBSQ HOSP IP/OBS MODERATE 35: CPT | Performed by: INTERNAL MEDICINE

## 2024-02-18 PROCEDURE — 83735 ASSAY OF MAGNESIUM: CPT | Performed by: INTERNAL MEDICINE

## 2024-02-18 PROCEDURE — 85025 COMPLETE CBC W/AUTO DIFF WBC: CPT | Performed by: INTERNAL MEDICINE

## 2024-02-18 PROCEDURE — 84100 ASSAY OF PHOSPHORUS: CPT | Performed by: INTERNAL MEDICINE

## 2024-02-18 RX ORDER — SODIUM CHLORIDE, SODIUM GLUCONATE, SODIUM ACETATE, POTASSIUM CHLORIDE, MAGNESIUM CHLORIDE, SODIUM PHOSPHATE, DIBASIC, AND POTASSIUM PHOSPHATE .53; .5; .37; .037; .03; .012; .00082 G/100ML; G/100ML; G/100ML; G/100ML; G/100ML; G/100ML; G/100ML
100 INJECTION, SOLUTION INTRAVENOUS CONTINUOUS
Status: DISPENSED | OUTPATIENT
Start: 2024-02-18 | End: 2024-02-19

## 2024-02-18 RX ORDER — CEFTRIAXONE 1 G/50ML
1000 INJECTION, SOLUTION INTRAVENOUS EVERY 24 HOURS
Status: DISCONTINUED | OUTPATIENT
Start: 2024-02-18 | End: 2024-02-18

## 2024-02-18 RX ADMIN — METHYLPREDNISOLONE SODIUM SUCCINATE 40 MG: 40 INJECTION, POWDER, FOR SOLUTION INTRAMUSCULAR; INTRAVENOUS at 09:15

## 2024-02-18 RX ADMIN — LIOTHYRONINE SODIUM 5 MCG: 5 TABLET ORAL at 05:03

## 2024-02-18 RX ADMIN — FOLIC ACID 1 MG: 1 TABLET ORAL at 09:15

## 2024-02-18 RX ADMIN — MONTELUKAST 10 MG: 10 TABLET, FILM COATED ORAL at 22:06

## 2024-02-18 RX ADMIN — PIPERACILLIN SODIUM AND TAZOBACTAM SODIUM 3.38 G: 36; 4.5 INJECTION, POWDER, LYOPHILIZED, FOR SOLUTION INTRAVENOUS at 03:58

## 2024-02-18 RX ADMIN — HYDROMORPHONE HYDROCHLORIDE 1 MG: 0.5 INJECTION, SOLUTION INTRAMUSCULAR; INTRAVENOUS; SUBCUTANEOUS at 11:31

## 2024-02-18 RX ADMIN — ONDANSETRON 4 MG: 2 INJECTION INTRAMUSCULAR; INTRAVENOUS at 09:15

## 2024-02-18 RX ADMIN — DIPHENHYDRAMINE HYDROCHLORIDE 50 MG: 50 INJECTION, SOLUTION INTRAMUSCULAR; INTRAVENOUS at 12:17

## 2024-02-18 RX ADMIN — SODIUM CHLORIDE, SODIUM GLUCONATE, SODIUM ACETATE, POTASSIUM CHLORIDE, MAGNESIUM CHLORIDE, SODIUM PHOSPHATE, DIBASIC, AND POTASSIUM PHOSPHATE 100 ML/HR: .53; .5; .37; .037; .03; .012; .00082 INJECTION, SOLUTION INTRAVENOUS at 16:38

## 2024-02-18 RX ADMIN — CEFTRIAXONE 1000 MG: 1 INJECTION, SOLUTION INTRAVENOUS at 09:14

## 2024-02-18 RX ADMIN — FAMOTIDINE 40 MG: 20 TABLET ORAL at 16:40

## 2024-02-18 RX ADMIN — FAMOTIDINE 40 MG: 20 TABLET ORAL at 09:14

## 2024-02-18 RX ADMIN — MIRTAZAPINE 15 MG: 15 TABLET, FILM COATED ORAL at 22:06

## 2024-02-18 RX ADMIN — ACETAMINOPHEN 325MG 650 MG: 325 TABLET ORAL at 05:04

## 2024-02-18 RX ADMIN — DIPHENHYDRAMINE HYDROCHLORIDE 50 MG: 50 INJECTION, SOLUTION INTRAMUSCULAR; INTRAVENOUS at 04:02

## 2024-02-18 RX ADMIN — HYDROMORPHONE HYDROCHLORIDE 0.5 MG: 1 INJECTION, SOLUTION INTRAMUSCULAR; INTRAVENOUS; SUBCUTANEOUS at 20:49

## 2024-02-18 RX ADMIN — LEVOTHYROXINE SODIUM 75 MCG: 75 TABLET ORAL at 05:04

## 2024-02-18 RX ADMIN — HYDROMORPHONE HYDROCHLORIDE 0.5 MG: 1 INJECTION, SOLUTION INTRAMUSCULAR; INTRAVENOUS; SUBCUTANEOUS at 09:15

## 2024-02-18 RX ADMIN — SCOPALAMINE 1 PATCH: 1 PATCH, EXTENDED RELEASE TRANSDERMAL at 09:16

## 2024-02-18 RX ADMIN — DIPHENHYDRAMINE HYDROCHLORIDE 50 MG: 50 INJECTION, SOLUTION INTRAMUSCULAR; INTRAVENOUS at 09:15

## 2024-02-18 RX ADMIN — ACETAMINOPHEN 325MG 650 MG: 325 TABLET ORAL at 22:06

## 2024-02-18 RX ADMIN — HYDROMORPHONE HYDROCHLORIDE 1 MG: 0.5 INJECTION, SOLUTION INTRAMUSCULAR; INTRAVENOUS; SUBCUTANEOUS at 23:32

## 2024-02-18 RX ADMIN — ACETAMINOPHEN 325MG 650 MG: 325 TABLET ORAL at 15:00

## 2024-02-18 RX ADMIN — HYDROMORPHONE HYDROCHLORIDE 0.5 MG: 1 INJECTION, SOLUTION INTRAMUSCULAR; INTRAVENOUS; SUBCUTANEOUS at 16:40

## 2024-02-18 RX ADMIN — DIPHENHYDRAMINE HYDROCHLORIDE 50 MG: 50 INJECTION, SOLUTION INTRAMUSCULAR; INTRAVENOUS at 20:49

## 2024-02-18 RX ADMIN — HYDROMORPHONE HYDROCHLORIDE 1 MG: 0.5 INJECTION, SOLUTION INTRAMUSCULAR; INTRAVENOUS; SUBCUTANEOUS at 18:17

## 2024-02-18 RX ADMIN — HYDROMORPHONE HYDROCHLORIDE 0.5 MG: 1 INJECTION, SOLUTION INTRAMUSCULAR; INTRAVENOUS; SUBCUTANEOUS at 04:03

## 2024-02-18 RX ADMIN — DIPHENHYDRAMINE HYDROCHLORIDE 50 MG: 50 INJECTION, SOLUTION INTRAMUSCULAR; INTRAVENOUS at 16:40

## 2024-02-18 NOTE — ASSESSMENT & PLAN NOTE
Reports decreased urinary output and having to push to urinate  Bladder scans within normal limits, no evidence of hydroureteronephrosis on ultrasound  Valuated by urology at [facility] and placed on Ditropan  Will need outpatient urodynamic studies as well as discussion on further medical management

## 2024-02-18 NOTE — ASSESSMENT & PLAN NOTE
Patient with history of Crohn's disease she is on Skyrizi as well as intravenous methotrexate which he takes on Thursday via infusion.  Given location of symptoms suspect that this is likely related to biliary source.  She did have stent placed in 2014.  Symptoms present since July.  Transferred for ERCP which was completed on 2/13; see separate report  Also with history of endometriosis, was reviewed with Dr. Jennings, he does not believe that this is related.  He recommends outpatient follow-up.  Patient will follow-up with Dr. Benito as previously instructed.  MRCP without any evidence of obvious obstruction, she is receiving significant amounts of opioids to control her pain  Has was n.p.o. since midnight on 2/12 for ERCP; briefly restarted on clears, but stated she had no appetite and was nauseous  Made n.p.o. on 2/15 when she was septic with biliary source  Patient requires IV pepcid and IV solumedrol prior to procedures, initiated on 2/11 at [facility]  Continue IV fluid hydration while n.p.o.    Monitor abdominal pain and treat with supportive care  LFTs highly elevated status post ERCP; GI following along  Given elevated white count, tenderness to palpation, and fever; Zosyn started concern for cholangitis  2/16-patient reports abdominal pain much better today.  White count and LFTs trending down dramatically.  Continue current antibiotic therapy.  Patient noted to have Klebsiella oxytocin bacteremia, discussed with ID, continue Zosyn at this time and await sensitivities.  Diet advanced to purée; continue supportive care.  2/17-abdominal pain continues to improve; as well as transaminitis.  GI advance diet to level 2.  Patient also restarted on steroids by GI, monitor on morning labs.

## 2024-02-18 NOTE — ASSESSMENT & PLAN NOTE
Patient noted to have fever this admission along with tachycardia, tachypnea, and highly elevated white count  Sepsis alert called-2/15; patient started on Zosyn  Has been receiving IV fluids and patient reported history of heart failure (although recent echo does not demonstrate that)  Will hold off on early goal-directed fluid resuscitation for sepsis given the fact that she is already received fluids and will continue gentle IV hydration  Monitor on daily labs; CT abdomen pelvis ordered by GI who is following along  2/16-now afebrile X 24 hours; white count trending down to 13k; continue Zosyn and await further GI recommendations; patient also noted to have bacteriuria; lactic acid stable.  Blood cultures now 1/2 positive for Klebsiella oxytocin; discussed with ID; continue Zosyn until sensitivities result  Update-2/18: Sensitivities show likely susceptibility to Rocephin; however, after receiving 1 dose, patient reported that she felt short of breath and was having an anaphylactic reaction.  Labs and vitals were stable, patient on steroids and was given Benadryl.  Will transition antibiotics to Unasyn.  It should be noted that Ancef, Cipro, Levaquin have similar allergy profile as Rocephin and should not be used.  Bactrim was also considered but patient has allergy to sulfa drugs.  Augmentin was considered but again patient has aversion to swallowing tablets.

## 2024-02-18 NOTE — ASSESSMENT & PLAN NOTE
Continue repletion  Monitor daily  Recent Labs     02/16/24  0609 02/17/24  0535 02/18/24  0521   K 3.2* 3.7 3.6

## 2024-02-18 NOTE — ASSESSMENT & PLAN NOTE
History of Crohn's disease on Skyrizi as well as methotrexate  Continue GI follow-up  Is due on Thursday for methotrexate and receives  intravenously  Resume folic acid  Patient requesting and GI approving continued steroids; will defer to GI team for transitioning to oral prednisone when appropriate

## 2024-02-18 NOTE — PROGRESS NOTES
[facility]  Progress Note  Name: Celine Roa I  MRN: [id number]  Unit/Bed#: [place] [address] -01 I Date of Admission: 2/13/2024   Date of Service: 2/18/2024 I Hospital Day: 5    Assessment/Plan   Sepsis without acute organ dysfunction (HCC)  Assessment & Plan  Patient noted to have fever this admission along with tachycardia, tachypnea, and highly elevated white count  Sepsis alert called-2/15; patient started on Zosyn  Has been receiving IV fluids and patient reported history of heart failure (although recent echo does not demonstrate that)  Will hold off on early goal-directed fluid resuscitation for sepsis given the fact that she is already received fluids and will continue gentle IV hydration  Monitor on daily labs; CT abdomen pelvis ordered by GI who is following along  2/16-now afebrile X 24 hours; white count trending down to 13k; continue Zosyn and await further GI recommendations; patient also noted to have bacteriuria; lactic acid stable.  Blood cultures now 1/2 positive for Klebsiella oxytocin; discussed with ID; continue Zosyn until sensitivities result  Update-2/18: Sensitivities show likely susceptibility to Rocephin; however, after receiving 1 dose, patient reported that she felt short of breath and was having an anaphylactic reaction.  Labs and vitals were stable, patient on steroids and was given Benadryl.  Will transition antibiotics to Unasyn.  It should be noted that Ancef, Cipro, Levaquin have similar allergy profile as Rocephin and should not be used.  Bactrim was also considered but patient has allergy to sulfa drugs.  Augmentin was considered but again patient has aversion to swallowing tablets.      Bladder spasm  Assessment & Plan  Reports decreased urinary output and having to push to urinate  Bladder scans within normal limits, no evidence of hydroureteronephrosis on ultrasound  Valuated by urology at [facility] and placed on Ditropan  Will need outpatient  urodynamic studies as well as discussion on further medical management    GERD without esophagitis  Assessment & Plan  Continue Pepcid    Crohn's colitis (HCC)  Assessment & Plan  History of Crohn's disease on Skyrizi as well as methotrexate  Continue GI follow-up  Is due on Thursday for methotrexate and receives  intravenously  Resume folic acid  Patient requesting and GI approving continued steroids; will defer to GI team for transitioning to oral prednisone when appropriate    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HCC)  Assessment & Plan  Continue prior to admission medication  Continue outpatient immunology follow-up  Patient reports intolerance to all medications with corn.      Acquired hypothyroidism  Assessment & Plan  Prior to admission on Cytomel, resume substitute with levothyroxine    Hypokalemia  Assessment & Plan  Continue repletion  Monitor daily  Recent Labs     02/16/24  0609 02/17/24  0535 02/18/24  0521   K 3.2* 3.7 3.6         * Abdominal pain  Assessment & Plan  Patient with history of Crohn's disease she is on Skyrizi as well as intravenous methotrexate which he takes on Thursday via infusion.  Given location of symptoms suspect that this is likely related to biliary source.  She did have stent placed in 2014.  Symptoms present since July.  Transferred for ERCP which was completed on 2/13; see separate report  Also with history of endometriosis, was reviewed with Dr. Jennings, he does not believe that this is related.  He recommends outpatient follow-up.  Patient will follow-up with Dr. Benito as previously instructed.  MRCP without any evidence of obvious obstruction, she is receiving significant amounts of opioids to control her pain  Has was n.p.o. since midnight on 2/12 for ERCP; briefly restarted on clears, but stated she had no appetite and was nauseous  Made n.p.o. on 2/15 when she was septic with biliary source  Patient requires IV pepcid and IV solumedrol prior to procedures, initiated on  2/11 at [facility]  Continue IV fluid hydration while n.p.o.    Monitor abdominal pain and treat with supportive care  LFTs highly elevated status post ERCP; GI following along  Given elevated white count, tenderness to palpation, and fever; Zosyn started concern for cholangitis  2/16-patient reports abdominal pain much better today.  White count and LFTs trending down dramatically.  Continue current antibiotic therapy.  Patient noted to have Klebsiella oxytocin bacteremia, discussed with ID, continue Zosyn at this time and await sensitivities.  Diet advanced to purée; continue supportive care.  2/17-abdominal pain continues to improve; as well as transaminitis.  GI advance diet to level 2.  Patient also restarted on steroids by GI, monitor on morning labs.             VTE Pharmacologic Prophylaxis:   Pharmacologic: Pharmacologic VTE Prophylaxis contraindicated due to low risk/early ambulation  Mechanical VTE Prophylaxis in Place: No    Patient Centered Rounds: I have performed bedside rounds with nursing staff today.    Discussions with Specialists or Other Care Team Provider: Gastroenterology    Education and Discussions with Family / Patient: Care plan discussed with patient who voiced understanding and agrees with recommendations.      Time Spent for Care: 45 minutes.  More than 50% of total time spent on counseling and coordination of care as described above.    Current Length of Stay: 5 day(s)    Current Patient Status: Inpatient   Certification Statement: The patient will continue to require additional inpatient hospital stay due to abdominal pain and bacteremia    Discharge Plan: To be determined, likely 24 to 48 hours    Code Status: Level 1 - Full Code      Subjective:   Patient seen and examined bedside, on my exam,  she reports some difficulty breathing after receiving Rocephin for bacteremia.  Patient reports she is having allergic reaction, but fortunately remains 97% SpO2 on room air, no wheezing  or other sequela appreciated.  Patient is on Solu-Medrol 40 mg daily as per GI for irritable bowel; was given additional Benadryl.  On day 4/7 antibiotics for bacteremia and will initiate Unasyn.  Repeat blood cultures negative at 24 hours.  Abdominal pain improved and transaminitis is improving as well.  Monitor on morning labs; await further GI recommendations.    Objective:     Vitals:   Temp (24hrs), Av.1 °F (36.2 °C), Min:97.1 °F (36.2 °C), Max:97.1 °F (36.2 °C)    Temp:  [97.1 °F (36.2 °C)] 97.1 °F (36.2 °C)  HR:  [80] 80  Resp:  [16] 16  BP: (96)/(58) 96/58  SpO2:  [93 %] 93 %  Body mass index is 27.1 kg/m².     Input and Output Summary (last 24 hours):       Intake/Output Summary (Last 24 hours) at 2024 1641  Last data filed at 2024 1858  Gross per 24 hour   Intake 120 ml   Output --   Net 120 ml       Physical Exam:     Physical Exam  Vitals and nursing note reviewed.   Constitutional:       General: She is not in acute distress.     Appearance: She is well-developed.   HENT:      Head: Normocephalic and atraumatic.   Eyes:      Conjunctiva/sclera: Conjunctivae normal.   Cardiovascular:      Rate and Rhythm: Normal rate.   Pulmonary:      Effort: Pulmonary effort is normal. No respiratory distress.   Abdominal:      General: There is no distension.      Palpations: Abdomen is soft.      Tenderness: There is no abdominal tenderness. There is no guarding.   Musculoskeletal:         General: No swelling.      Cervical back: Neck supple.   Skin:     General: Skin is warm and dry.   Neurological:      Mental Status: She is alert and oriented to person, place, and time.           Additional Data:     Labs:    Results from last 7 days   Lab Units 24  0521   WBC Thousand/uL 11.71*   HEMOGLOBIN g/dL 12.9   HEMATOCRIT % 37.8   PLATELETS Thousands/uL 355   NEUTROS PCT % 84*   LYMPHS PCT % 7*   MONOS PCT % 8   EOS PCT % 0     Results from last 7 days   Lab Units 24  0521   SODIUM mmol/L 138    POTASSIUM mmol/L 3.6   CHLORIDE mmol/L 105   CO2 mmol/L 26   BUN mg/dL 6   CREATININE mg/dL 0.67   ANION GAP mmol/L 7   CALCIUM mg/dL 8.3*   ALBUMIN g/dL 3.1*   TOTAL BILIRUBIN mg/dL 1.12*   ALK PHOS U/L 175*   ALT U/L 179*   AST U/L 36   GLUCOSE RANDOM mg/dL 173*                 Results from last 7 days   Lab Units 02/15/24  0935   LACTIC ACID mmol/L 2.0           * I Have Reviewed All Lab Data Listed Above.  * Additional Pertinent Lab Tests Reviewed: All Labs Within Last 24 Hours Reviewed    Imaging:    Imaging Reports Reviewed Today Include:   Imaging Personally Reviewed by Myself Includes:      Recent Cultures (last 7 days):     Results from last 7 days   Lab Units 02/16/24  1117 02/15/24  0932   BLOOD CULTURE  No Growth at 24 hrs.  No Growth at 24 hrs. No Growth at 72 hrs.  Klebsiella oxytoca*   GRAM STAIN RESULT   --  Gram negative rods*       Last 24 Hours Medication List:   Current Facility-Administered Medications   Medication Dose Route Frequency Provider Last Rate    acetaminophen  650 mg Oral Q8H [facility] Babin MD      [START ON 2/19/2024] ampicillin-sulbactam  3 g Intravenous Q6H Eagle Babin MD      butalbital-acetaminophen-caffeine  1 tablet Oral Q4H PRN Miles Holm, DO      diphenhydrAMINE  50 mg Intravenous Q4H PRN Miles Holm, DO      famotidine  40 mg Oral BID Eagle Babin MD      folic acid  1 mg Oral Daily Miles Holm, DO      HYDROmorphone  0.5 mg Intravenous Q4H PRN Elizabeth Bose PA-C      HYDROmorphone  1 mg Intravenous Q4H PRN Eagle Babin MD      hydrOXYzine HCL  15 mg Oral Daily Miles Holm, DO      levothyroxine  75 mcg Oral Every Other Day Miles Holm, DO      liothyronine  5 mcg Oral Daily Miles Holm, DO      loratadine  10 mg Oral Daily Miles Holm, DO      methylPREDNISolone sodium succinate  40 mg Intravenous Daily Billy Loredo MD      mirtazapine  15 mg Oral HS Miles Holm, DO      montelukast  10 mg Oral HS Miles Holm,  DO      multi-electrolyte  100 mL/hr Intravenous Continuous Eagle Babin  mL/hr (02/18/24 7004)    ondansetron  4 mg Intravenous Q6H PRN Miles Holm, DO      oxybutynin  5 mg Oral BID PRN Miles Holm, DO      scopolamine  1 patch Transdermal Q72H Ruben G DO Thierry          Today, Patient Was Seen By: Eagle Babin MD    ** Please Note: Dictation voice to text software may have been used in the creation of this document. **

## 2024-02-19 ENCOUNTER — HOSPITAL ENCOUNTER (OUTPATIENT)
Dept: INFUSION CENTER | Facility: HOSPITAL | Age: 49
Discharge: HOME | End: 2024-02-19
Attending: INTERNAL MEDICINE

## 2024-02-19 LAB
ALBUMIN SERPL BCP-MCNC: 3.1 G/DL (ref 3.5–5)
ALP SERPL-CCNC: 150 U/L (ref 34–104)
ALT SERPL W P-5'-P-CCNC: 127 U/L (ref 7–52)
ANION GAP SERPL CALCULATED.3IONS-SCNC: 6 MMOL/L
AST SERPL W P-5'-P-CCNC: 25 U/L (ref 13–39)
BASOPHILS # BLD AUTO: 0.08 THOUSANDS/ÂΜL (ref 0–0.1)
BASOPHILS NFR BLD AUTO: 1 % (ref 0–1)
BILIRUB SERPL-MCNC: 0.62 MG/DL (ref 0.2–1)
BUN SERPL-MCNC: 8 MG/DL (ref 5–25)
CALCIUM ALBUM COR SERPL-MCNC: 8.9 MG/DL (ref 8.3–10.1)
CALCIUM SERPL-MCNC: 8.2 MG/DL (ref 8.4–10.2)
CHLORIDE SERPL-SCNC: 106 MMOL/L (ref 96–108)
CO2 SERPL-SCNC: 27 MMOL/L (ref 21–32)
CREAT SERPL-MCNC: 0.61 MG/DL (ref 0.6–1.3)
EOSINOPHIL # BLD AUTO: 0.2 THOUSAND/ÂΜL (ref 0–0.61)
EOSINOPHIL NFR BLD AUTO: 2 % (ref 0–6)
ERYTHROCYTE [DISTWIDTH] IN BLOOD BY AUTOMATED COUNT: 16.6 % (ref 11.6–15.1)
GFR SERPL CREATININE-BSD FRML MDRD: 106 ML/MIN/1.73SQ M
GLUCOSE SERPL-MCNC: 105 MG/DL (ref 65–140)
HCT VFR BLD AUTO: 39.5 % (ref 34.8–46.1)
HGB BLD-MCNC: 13.1 G/DL (ref 11.5–15.4)
IMM GRANULOCYTES # BLD AUTO: 0.1 THOUSAND/UL (ref 0–0.2)
IMM GRANULOCYTES NFR BLD AUTO: 1 % (ref 0–2)
LIPASE SERPL-CCNC: 9 U/L (ref 11–82)
LYMPHOCYTES # BLD AUTO: 2.16 THOUSANDS/ÂΜL (ref 0.6–4.47)
LYMPHOCYTES NFR BLD AUTO: 18 % (ref 14–44)
MAGNESIUM SERPL-MCNC: 1.9 MG/DL (ref 1.9–2.7)
MCH RBC QN AUTO: 33.4 PG (ref 26.8–34.3)
MCHC RBC AUTO-ENTMCNC: 33.2 G/DL (ref 31.4–37.4)
MCV RBC AUTO: 101 FL (ref 82–98)
MONOCYTES # BLD AUTO: 0.98 THOUSAND/ÂΜL (ref 0.17–1.22)
MONOCYTES NFR BLD AUTO: 8 % (ref 4–12)
NEUTROPHILS # BLD AUTO: 8.82 THOUSANDS/ÂΜL (ref 1.85–7.62)
NEUTS SEG NFR BLD AUTO: 70 % (ref 43–75)
NRBC BLD AUTO-RTO: 0 /100 WBCS
PHOSPHATE SERPL-MCNC: 2.6 MG/DL (ref 2.7–4.5)
PLATELET # BLD AUTO: 255 THOUSANDS/UL (ref 149–390)
PMV BLD AUTO: 11 FL (ref 8.9–12.7)
POTASSIUM SERPL-SCNC: 3.2 MMOL/L (ref 3.5–5.3)
PROT SERPL-MCNC: 5.4 G/DL (ref 6.4–8.4)
RBC # BLD AUTO: 3.92 MILLION/UL (ref 3.81–5.12)
SODIUM SERPL-SCNC: 139 MMOL/L (ref 135–147)
WBC # BLD AUTO: 12.34 THOUSAND/UL (ref 4.31–10.16)

## 2024-02-19 PROCEDURE — 85025 COMPLETE CBC W/AUTO DIFF WBC: CPT | Performed by: INTERNAL MEDICINE

## 2024-02-19 PROCEDURE — 80053 COMPREHEN METABOLIC PANEL: CPT | Performed by: INTERNAL MEDICINE

## 2024-02-19 PROCEDURE — 83735 ASSAY OF MAGNESIUM: CPT | Performed by: INTERNAL MEDICINE

## 2024-02-19 PROCEDURE — 99232 SBSQ HOSP IP/OBS MODERATE 35: CPT | Performed by: INTERNAL MEDICINE

## 2024-02-19 PROCEDURE — 88305 TISSUE EXAM BY PATHOLOGIST: CPT | Performed by: STUDENT IN AN ORGANIZED HEALTH CARE EDUCATION/TRAINING PROGRAM

## 2024-02-19 PROCEDURE — 83690 ASSAY OF LIPASE: CPT | Performed by: INTERNAL MEDICINE

## 2024-02-19 PROCEDURE — 99232 SBSQ HOSP IP/OBS MODERATE 35: CPT | Performed by: STUDENT IN AN ORGANIZED HEALTH CARE EDUCATION/TRAINING PROGRAM

## 2024-02-19 PROCEDURE — 84100 ASSAY OF PHOSPHORUS: CPT | Performed by: INTERNAL MEDICINE

## 2024-02-19 RX ORDER — BISACODYL 5 MG/1
10 TABLET, DELAYED RELEASE ORAL DAILY
Status: DISCONTINUED | OUTPATIENT
Start: 2024-02-19 | End: 2024-02-24

## 2024-02-19 RX ADMIN — DIPHENHYDRAMINE HYDROCHLORIDE 50 MG: 50 INJECTION, SOLUTION INTRAMUSCULAR; INTRAVENOUS at 00:54

## 2024-02-19 RX ADMIN — DIPHENHYDRAMINE HYDROCHLORIDE 50 MG: 50 INJECTION, SOLUTION INTRAMUSCULAR; INTRAVENOUS at 06:39

## 2024-02-19 RX ADMIN — LIOTHYRONINE SODIUM 5 MCG: 5 TABLET ORAL at 05:36

## 2024-02-19 RX ADMIN — DIPHENHYDRAMINE HYDROCHLORIDE 50 MG: 50 INJECTION, SOLUTION INTRAMUSCULAR; INTRAVENOUS at 11:14

## 2024-02-19 RX ADMIN — HYDROMORPHONE HYDROCHLORIDE 1 MG: 0.5 INJECTION, SOLUTION INTRAMUSCULAR; INTRAVENOUS; SUBCUTANEOUS at 11:14

## 2024-02-19 RX ADMIN — SODIUM CHLORIDE 3 G: 9 INJECTION, SOLUTION INTRAVENOUS at 15:25

## 2024-02-19 RX ADMIN — HYDROMORPHONE HYDROCHLORIDE 0.5 MG: 1 INJECTION, SOLUTION INTRAMUSCULAR; INTRAVENOUS; SUBCUTANEOUS at 07:35

## 2024-02-19 RX ADMIN — FOLIC ACID 1 MG: 1 TABLET ORAL at 09:57

## 2024-02-19 RX ADMIN — DIPHENHYDRAMINE HYDROCHLORIDE 50 MG: 50 INJECTION, SOLUTION INTRAMUSCULAR; INTRAVENOUS at 19:39

## 2024-02-19 RX ADMIN — ACETAMINOPHEN 325MG 650 MG: 325 TABLET ORAL at 05:36

## 2024-02-19 RX ADMIN — DIPHENHYDRAMINE HYDROCHLORIDE 50 MG: 50 INJECTION, SOLUTION INTRAMUSCULAR; INTRAVENOUS at 23:40

## 2024-02-19 RX ADMIN — SODIUM CHLORIDE 3 G: 9 INJECTION, SOLUTION INTRAVENOUS at 09:58

## 2024-02-19 RX ADMIN — SODIUM CHLORIDE, SODIUM GLUCONATE, SODIUM ACETATE, POTASSIUM CHLORIDE, MAGNESIUM CHLORIDE, SODIUM PHOSPHATE, DIBASIC, AND POTASSIUM PHOSPHATE 100 ML/HR: .53; .5; .37; .037; .03; .012; .00082 INJECTION, SOLUTION INTRAVENOUS at 03:22

## 2024-02-19 RX ADMIN — HYDROMORPHONE HYDROCHLORIDE 1 MG: 0.5 INJECTION, SOLUTION INTRAMUSCULAR; INTRAVENOUS; SUBCUTANEOUS at 23:40

## 2024-02-19 RX ADMIN — HYDROMORPHONE HYDROCHLORIDE 0.5 MG: 1 INJECTION, SOLUTION INTRAMUSCULAR; INTRAVENOUS; SUBCUTANEOUS at 18:12

## 2024-02-19 RX ADMIN — HYDROMORPHONE HYDROCHLORIDE 1 MG: 0.5 INJECTION, SOLUTION INTRAMUSCULAR; INTRAVENOUS; SUBCUTANEOUS at 06:39

## 2024-02-19 RX ADMIN — METHYLPREDNISOLONE SODIUM SUCCINATE 40 MG: 40 INJECTION, POWDER, FOR SOLUTION INTRAMUSCULAR; INTRAVENOUS at 09:56

## 2024-02-19 RX ADMIN — HYDROMORPHONE HYDROCHLORIDE 1 MG: 0.5 INJECTION, SOLUTION INTRAMUSCULAR; INTRAVENOUS; SUBCUTANEOUS at 19:38

## 2024-02-19 RX ADMIN — HYDROMORPHONE HYDROCHLORIDE 1 MG: 0.5 INJECTION, SOLUTION INTRAMUSCULAR; INTRAVENOUS; SUBCUTANEOUS at 15:25

## 2024-02-19 RX ADMIN — HYDROMORPHONE HYDROCHLORIDE 0.5 MG: 1 INJECTION, SOLUTION INTRAMUSCULAR; INTRAVENOUS; SUBCUTANEOUS at 13:14

## 2024-02-19 RX ADMIN — BISACODYL 10 MG: 5 TABLET, COATED ORAL at 11:15

## 2024-02-19 RX ADMIN — FAMOTIDINE 40 MG: 20 TABLET ORAL at 09:57

## 2024-02-19 RX ADMIN — MONTELUKAST 10 MG: 10 TABLET, FILM COATED ORAL at 21:21

## 2024-02-19 RX ADMIN — MIRTAZAPINE 15 MG: 15 TABLET, FILM COATED ORAL at 21:21

## 2024-02-19 RX ADMIN — SODIUM CHLORIDE 3 G: 9 INJECTION, SOLUTION INTRAVENOUS at 21:23

## 2024-02-19 RX ADMIN — OXYBUTYNIN CHLORIDE 5 MG: 5 TABLET ORAL at 22:04

## 2024-02-19 RX ADMIN — HYDROMORPHONE HYDROCHLORIDE 0.5 MG: 1 INJECTION, SOLUTION INTRAMUSCULAR; INTRAVENOUS; SUBCUTANEOUS at 03:20

## 2024-02-19 RX ADMIN — FAMOTIDINE 40 MG: 20 TABLET ORAL at 18:13

## 2024-02-19 RX ADMIN — ACETAMINOPHEN 325MG 650 MG: 325 TABLET ORAL at 21:21

## 2024-02-19 RX ADMIN — ACETAMINOPHEN 325MG 650 MG: 325 TABLET ORAL at 13:14

## 2024-02-19 RX ADMIN — DIPHENHYDRAMINE HYDROCHLORIDE 50 MG: 50 INJECTION, SOLUTION INTRAMUSCULAR; INTRAVENOUS at 15:25

## 2024-02-19 RX ADMIN — HYDROXYZINE HYDROCHLORIDE 15 MG: 10 TABLET ORAL at 13:18

## 2024-02-19 NOTE — ASSESSMENT & PLAN NOTE
She has klebsiella oxytoca bacteremia from biliary tract source  She has multiple drug allergies  Antibiotics switched to unasyn and she is tolerating it  Will place a formal ID consult for possible cholangitis and bacteremia

## 2024-02-19 NOTE — ASSESSMENT & PLAN NOTE
History of Crohn's disease on Skyrizi as well as methotrexate  Currently on solumedrol daily for 1 week  Defer taper to GI

## 2024-02-19 NOTE — UTILIZATION REVIEW
Continued Stay Review    Date: 2/18                          Current Patient Class: IP  Current Level of Care: MS    HPI:48 y.o. female initially admitted on 2/13 Sepsis w/o Acute organ dysfunction, Crohn's, concern for cholangitis.     Assessment/Plan:   WBC 11.71, pt is on IV antibiotics, IV SOluMedrol.  Is afebrile. Bacteruria on iV antibiotics.  Sensitivities show likely susceptibility to Rocephin; however, after receiving 1 dose, patient reported that she felt short of breath and was having an anaphylactic reaction.  Labs and vitals were stable, patient on steroids and was given Benadryl.  Will transition antibiotics to Unasyn.  It should be noted that Ancef, Cipro, Levaquin have similar allergy profile as Rocephin and should not be used.  Bactrim was also considered but patient has allergy to sulfa drugs.  Augmentin was considered but again patient has aversion to swallowing tablets.   pt remains using parenteral analgesia, IV Zofran.      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  She also complains of right upper quadrant pain, nausea, vomiting, and fever.  This could be due to papillary stenosis or sphincter of Oddi dysfunction given her prior history, an infectious gastroenteritis, reflux esophagitis, or peptic ulcer disease.  She had an ERCP and there is no evidence of common bile duct stone or stricture, but she had removal of debris and extension of the sphincterotomy and her transaminases seem to be improving.         Plan:  We will complete her antibiotic course, follow-up her blood cultures, increase her Pepcid to 40 mg by mouth twice a day and continue Zofran as needed.  We will also follow-up on blood cultures and monitor her abdominal exam.       Vital Signs:   Date/Time Temp Pulse Resp BP MAP (mmHg) SpO2 O2 Device Patient Position - Orthostatic VS   02/19/24 07:23:43 98.3  °F (36.8 °C) 63 15 112/75 87 92 % -- --   02/19/24 06:21:31 98.6 °F (37 °C) 65 16 103/66 78 95 % -- --   02/18/24 21:38:23 98.8 °F (37.1 °C) 71 16 101/64 76 93 % None (Room air) Lying   02/17/24 21:37:57 97.1 °F (36.2 °C) Abnormal  80 16 96/58 71 93 % -- Lying   02/17/24 1125 -- 77 -- -- -- -- -- --   02/17/24 07:33:19 -- 74 14 93/56 68 93 % -- --       Pertinent Labs/Diagnostic Results:     2/15 CTAP - No acute findings in the abdomen or pelvis. Pneumobilia in the setting of recent sphincterotomy.   2/14 ERCP biliary  - No definite filling defects. Existing sphincterotomy extended and balloon sweeping removed small amount of sludge and no stones.         Results from last 7 days   Lab Units 02/19/24  0527 02/18/24  0521 02/17/24  0535 02/16/24  0609 02/15/24  0612 02/15/24  0612   WBC Thousand/uL 12.34* 11.71* 7.47 13.00*  --  19.86*   HEMOGLOBIN g/dL 13.1 12.9 13.2 12.7  --  13.8   HEMATOCRIT % 39.5 37.8 39.8 37.1  --  41.0   PLATELETS Thousands/uL 255 355 339 330  --  395*   NEUTROS ABS Thousands/µL 8.82* 9.81* 5.12 10.31*   < >  --     < > = values in this interval not displayed.         Results from last 7 days   Lab Units 02/19/24  0823 02/18/24  0521 02/17/24  0535 02/16/24  0609 02/15/24  0612 02/14/24  0452 02/13/24  0552   SODIUM mmol/L 139 138 137 139 137   < > 141   POTASSIUM mmol/L 3.2* 3.6 3.7 3.2* 3.6   < > 3.1*   CHLORIDE mmol/L 106 105 107 109* 104   < > 108   CO2 mmol/L 27 26 22 25 22   < > 26   ANION GAP mmol/L 6 7 8 5 11   < > 7   BUN mg/dL 8 6 2* 6 7   < > 6   CREATININE mg/dL 0.61 0.67 0.58* 0.59* 0.76   < > 0.63   EGFR ml/min/1.73sq m 106 102 107 107 91   < > 104   CALCIUM mg/dL 8.2* 8.3* 7.5* 7.8* 8.0*   < > 7.7*   MAGNESIUM mg/dL 1.9 1.9 1.9 1.9  --   --  1.9   PHOSPHORUS mg/dL 2.6* 2.7 2.8 2.0*  --   --   --     < > = values in this interval not displayed.     Results from last 7 days   Lab Units 02/19/24  0823 02/18/24  0521 02/17/24  0535 02/16/24  0609 02/15/24  0935   AST U/L 25 36 87*  239* 677*   ALT U/L 127* 179* 253* 399* 577*   ALK PHOS U/L 150* 175* 184* 180* 213*   TOTAL PROTEIN g/dL 5.4* 5.5* 5.4* 5.1* 5.6*   ALBUMIN g/dL 3.1* 3.1* 3.0* 3.0* 3.4*   TOTAL BILIRUBIN mg/dL 0.62 1.12* 2.02* 2.43* 2.49*   BILIRUBIN DIRECT mg/dL  --   --   --   --  1.86*         Results from last 7 days   Lab Units 02/19/24  0823 02/18/24  0521 02/17/24  0535 02/16/24  0609 02/15/24  0612 02/14/24  0452 02/13/24  0552   GLUCOSE RANDOM mg/dL 105 173* 76 76 104 71 102     Results from last 7 days   Lab Units 02/15/24  0935   LACTIC ACID mmol/L 2.0           Results from last 7 days   Lab Units 02/15/24  0935   LIPASE u/L 11                 Results from last 7 days   Lab Units 02/15/24  1043   CLARITY UA  Clear   COLOR UA  Yellow   SPEC GRAV UA  1.004   PH UA  6.0   GLUCOSE UA mg/dl Negative   KETONES UA mg/dl Negative   BLOOD UA  Trace*   PROTEIN UA mg/dl Negative   NITRITE UA  Negative   BILIRUBIN UA  Negative   UROBILINOGEN UA (BE) mg/dl <2.0   LEUKOCYTES UA  Negative   WBC UA /hpf 1-2   RBC UA /hpf 1-2   BACTERIA UA /hpf Moderate*   EPITHELIAL CELLS WET PREP /hpf Occasional       Results from last 7 days   Lab Units 02/16/24  1117 02/15/24  0932   BLOOD CULTURE  No Growth at 48 hrs.  No Growth at 48 hrs. No Growth at 72 hrs.  Klebsiella oxytoca*   GRAM STAIN RESULT   --  Gram negative rods*     Medications:   Scheduled Medications:  acetaminophen, 650 mg, Oral, Q8H KARISSA  ampicillin-sulbactam, 3 g, Intravenous, Q6H  famotidine, 40 mg, Oral, BID  folic acid, 1 mg, Oral, Daily  hydrOXYzine HCL, 15 mg, Oral, Daily  levothyroxine, 75 mcg, Oral, Every Other Day  liothyronine, 5 mcg, Oral, Daily  loratadine, 10 mg, Oral, Daily  methylPREDNISolone sodium succinate, 40 mg, Intravenous, Daily  mirtazapine, 15 mg, Oral, HS  montelukast, 10 mg, Oral, HS  scopolamine, 1 patch, Transdermal, Q72H      Continuous IV Infusions:  multi-electrolyte, 100 mL/hr, Intravenous, Continuous      PRN  Meds:  butalbital-acetaminophen-caffeine, 1 tablet, Oral, Q4H PRN  diphenhydrAMINE, 50 mg, Intravenous, Q4H PRN - x 5 2/17 and 2/18  HYDROmorphone, 0.5 mg, Intravenous, Q4H PRN - x 5 2/17, x 4 2/18  HYDROmorphone, 1 mg, Intravenous, Q4H PRN - x 2 2/17, x 3 2/18  ondansetron, 4 mg, Intravenous, Q6H PRN - x 2 2/17. X 1 2/18  oxybutynin, 5 mg, Oral, BID PRN    Discharge Plan: likely home    Network Utilization Review Department  ATTENTION: Please call with any questions or concerns to [phone] and carefully listen to the prompts so that you are directed to the right person. All voicemails are confidential.   For Discharge needs, contact Care Management DC Support Team at [phone] opt. [phone]  Send all requests for admission clinical reviews, approved or denied determinations and any other requests to dedicated fax number below belonging to the [place] where the patient is receiving treatment. List of dedicated fax numbers for the Facilities:  FACILITY NAME UR FAX NUMBER   ADMISSION DENIALS (Administrative/Medical Necessity) [phone]   DISCHARGE SUPPORT TEAM (NETWORK) [phone]   PARENT CHILD HEALTH (Maternity/NICU/Pediatrics) [phone]   [facility] [phone]   [facility] [phone]   [facility] [phone]   [facility] [phone]   [facility] [phone]   [facility] [phone]   [facility] [phone]   [facility] [phone]   [facility] [phone]   [facility] [phone]   [facility] [phone]   [facility] [phone]

## 2024-02-19 NOTE — ASSESSMENT & PLAN NOTE
She is being treated with antibiotics for possible cholangitis  S/P ERCP with sphincterotomy and removal of debris  GI feels there is also a component of constipation  She is on dilaudid

## 2024-02-19 NOTE — ASSESSMENT & PLAN NOTE
Corn allergy noted   Multiple drug allergies noted  ID consult due given multiple drug allergies  She is also on benadryl 50 mg IV Q4 prn

## 2024-02-19 NOTE — ASSESSMENT & PLAN NOTE
Continue repletion  Monitor daily  Recent Labs     02/17/24  0535 02/18/24  0521 02/19/24  0823   K 3.7 3.6 3.2*

## 2024-02-19 NOTE — PROGRESS NOTES
[facility] - Progress Note  Celine Roa 48 y.o. 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 S/p ERCP 2/14/24 with extension of sphincterotomy, balloon sweeps with removal of debris, and no residual defect seen on occlusion cholangiogram.      Cholangitis  S/p ERCP with removal of debris and extension of sphincterotomy 2/14/24, with transamianses improving  Trend daily CBC, LFTs  Abx x10 days     Abdominal Pain  Suspect etiology related to constipation as pt reports no BM for several days (~1 week)   Start dulcolax   Pt not amenable to miralax    ______________________________________________________________________    SUBJECTIVE:     NAEON. Endorsing worsening abd pain today.     Scheduled Meds:  Current Facility-Administered Medications   Medication Dose Route Frequency Provider Last Rate    acetaminophen  650 mg Oral Q8H [facility] Eagle Babin MD      ampicillin-sulbactam  3 g Intravenous Q6H Eagle Babin MD      butalbital-acetaminophen-caffeine  1 tablet Oral Q4H PRN Miles Holm, DO      diphenhydrAMINE  50 mg Intravenous Q4H PRN Miles Holm, DO      famotidine  40 mg Oral BID Eagle Babin MD      folic acid  1 mg Oral Daily Miles Holm, DO      HYDROmorphone  0.5 mg Intravenous Q4H PRN Elizabeth Bose PA-C      HYDROmorphone  1 mg Intravenous Q4H PRN Eagle Babin MD      hydrOXYzine HCL  15 mg Oral Daily Miles Holm, DO      levothyroxine  75 mcg Oral Every Other Day Miles Holm, DO      liothyronine  5 mcg Oral Daily Miles Holm, DO      loratadine  10 mg Oral Daily Miles Holm, DO      methylPREDNISolone sodium succinate  40 mg Intravenous Daily Billy Loredo MD      mirtazapine  15 mg Oral HS Miles Roberts  Alta, DO      montelukast  10 mg Oral HS Miles Holm, DO      multi-electrolyte  100 mL/hr Intravenous Continuous Eagle Babin  mL/hr (02/19/24 0322)    ondansetron  4 mg Intravenous Q6H PRN Miles Holm, DO      oxybutynin  5 mg Oral BID PRN Miles Holm, DO      scopolamine  1 patch Transdermal Q72H Ruben G Paulaayabandar, DO       Continuous Infusions:multi-electrolyte, 100 mL/hr, Last Rate: 100 mL/hr (02/19/24 0322)      PRN Meds:.  butalbital-acetaminophen-caffeine    diphenhydrAMINE    HYDROmorphone    HYDROmorphone    ondansetron    oxybutynin    OBJECTIVE:     Objective   Blood pressure 112/75, pulse 63, temperature 98.3 °F (36.8 °C), resp. rate 15, weight 74.5 kg (164 lb 3.9 oz), SpO2 92%, not currently breastfeeding. Body mass index is 27.13 kg/m².    Intake/Output Summary (Last 24 hours) at 2/19/2024 0929  Last data filed at 2/18/2024 1847  Gross per 24 hour   Intake --   Output 1000 ml   Net -1000 ml       PHYSICAL EXAM:   General Appearance: Awake and alert, in no acute distress  Abdomen: Soft, TTP, non-distended; no masses or no organomegaly    Invasive Devices       Peripheral Intravenous Line  Duration             Peripheral IV 02/19/24 Right Hand <1 day              Line  Duration             Pump Device  -- days                    LAB RESULTS:      Lab Units 02/19/24  0823 02/18/24  0521 02/17/24  0535 02/16/24  0609 02/15/24  0612 02/14/24  0452 02/13/24  0552 02/09/24  0515 02/08/24  0552   SODIUM mmol/L  --  138 137 139 137 141 141   < > 139   POTASSIUM mmol/L  --  3.6 3.7 3.2* 3.6 3.3* 3.1*   < > 4.1   CHLORIDE mmol/L  --  105 107 109* 104 107 108   < > 106   CO2 mmol/L  --  26 22 25 22 27 26   < > 28   BUN mg/dL  --  6 2* 6 7 6 6   < > 8   CREATININE mg/dL  --  0.67 0.58* 0.59* 0.76 0.71 0.63   < > 0.69   GLUCOSE RANDOM mg/dL  --  173* 76 76 104 71 102   < > 74   CALCIUM mg/dL  --  8.3* 7.5* 7.8* 8.0* 8.0* 7.7*   < > 7.9*   MAGNESIUM mg/dL  --  1.9 1.9 1.9  --   --  1.9  --  2.3    PHOSPHORUS mg/dL 2.6* 2.7 2.8 2.0*  --   --   --   --  2.6*    < > = values in this interval not displayed.            Lab Units 02/18/24  0521 02/17/24  0535 02/16/24  0609 02/15/24  0935 02/14/24  0452 09/02/23  0717 08/28/23  0921   TOTAL PROTEIN g/dL 5.5* 5.4* 5.1* 5.6* 5.5*   < > 7.1   ALBUMIN g/dL 3.1* 3.0* 3.0* 3.4* 3.3*   < > 4.1   TOTAL BILIRUBIN mg/dL 1.12* 2.02* 2.43* 2.49* 0.21   < > 0.31   BILIRUBIN DIRECT mg/dL  --   --   --  1.86*  --   --  0.13   AST U/L 36 87* 239* 677* 12*   < > 22   ALT U/L 179* 253* 399* 577* 18   < > 26   ALK PHOS U/L 175* 184* 180* 213* 45   < > 56    < > = values in this interval not displayed.           Lab Units 02/19/24  0527 02/18/24  0521 02/17/24  0535 02/16/24  0609 02/15/24  0612   WBC Thousand/uL 12.34* 11.71* 7.47 13.00* 19.86*   HEMOGLOBIN g/dL 13.1 12.9 13.2 12.7 13.8   HEMATOCRIT % 39.5 37.8 39.8 37.1 41.0   PLATELETS Thousands/uL 255 355 339 330 395*   MCV fL 101* 96 99* 97 97       Lab Results   Component Value Date    IRON 76 01/29/2024    TIBC 310 01/29/2024    FERRITIN 440 (H) 01/29/2024       Lab Results   Component Value Date    INR 1.00 02/07/2024    INR 0.91 09/02/2023    INR 0.86 08/28/2023    PROTIME 13.1 02/07/2024    PROTIME 12.2 09/02/2023    PROTIME 11.9 08/28/2023       RADIOLOGY RESULTS:   No results found.  Narrative/Impressions - 3 day look back     Shady Montano M.D.  PGY-5 Gastroenterology Fellow  [facility]  Available on [email or web address]

## 2024-02-19 NOTE — PROGRESS NOTES
[facility]  Progress Note  Name: Celine Roa I  MRN: [id number]  Unit/Bed#: [place] [address] -01 I Date of Admission: 2/13/2024   Date of Service: 2/19/2024 I Hospital Day: 6    Assessment/Plan   * Abdominal pain  Assessment & Plan  She is being treated with antibiotics for possible cholangitis  S/P ERCP with sphincterotomy and removal of debris  GI feels there is also a component of constipation  She is on dilaudid     Sepsis without acute organ dysfunction (HCC)  Assessment & Plan  She has klebsiella oxytoca bacteremia from biliary tract source  She has multiple drug allergies  Antibiotics switched to unasyn and she is tolerating it  Will place a formal ID consult for possible cholangitis and bacteremia    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HCC)  Assessment & Plan  Corn allergy noted   Multiple drug allergies noted  ID consult due given multiple drug allergies  She is also on benadryl 50 mg IV Q4 prn    GERD without esophagitis  Assessment & Plan  Continue Pepcid    Crohn's colitis (HCC)  Assessment & Plan  History of Crohn's disease on Skyrizi as well as methotrexate  Currently on solumedrol 40 mg IV daily for 1 week  Defer taper to GI    Acquired hypothyroidism  Assessment & Plan  Continue Cytomel       VTE Pharmacologic Prophylaxis: VTE Score: 2 Low Risk (Score 0-2) - Encourage Ambulation.    Patient Centered Rounds: I performed bedside rounds with nursing staff today.   Discussions with Specialists or Other Care Team Provider: PADILLA Montano    Current Length of Stay: 6 day(s)  Current Patient Status: Inpatient   Certification Statement: The patient will continue to require additional inpatient hospital stay due to abdominal pain, bacteremia  Discharge Plan: Anticipate discharge in 48-72 hrs to home with home services.    Code Status: Level 1 - Full Code    Subjective:   + persistent  ruq pain  She said this feels worse than yesterday  She also said she has no issues with swallowing and was  eating regular food prior  She just has corn allergy    Objective:     Vitals:   Temp (24hrs), Av.6 °F (37 °C), Min:98.3 °F (36.8 °C), Max:98.8 °F (37.1 °C)    Temp:  [98.3 °F (36.8 °C)-98.8 °F (37.1 °C)] 98.5 °F (36.9 °C)  HR:  [63-74] 74  Resp:  [15-16] 16  BP: (101-114)/(64-79) 114/79  SpO2:  [92 %-97 %] 97 %  Body mass index is 27.13 kg/m².     Input and Output Summary (last 24 hours):     Intake/Output Summary (Last 24 hours) at 2024 1816  Last data filed at 2024 1847  Gross per 24 hour   Intake --   Output 1000 ml   Net -1000 ml       Physical Exam:   Physical Exam  Vitals reviewed.   Constitutional:       Appearance: She is not ill-appearing.   HENT:      Head: Normocephalic and atraumatic.      Nose: No congestion or rhinorrhea.   Eyes:      General: No scleral icterus.  Cardiovascular:      Rate and Rhythm: Normal rate and regular rhythm.   Pulmonary:      Breath sounds: No wheezing or rhonchi.   Abdominal:      Palpations: Abdomen is soft.      Comments: RUQ tenderness   Musculoskeletal:      Right lower leg: No edema.      Left lower leg: No edema.   Skin:     General: Skin is warm and dry.   Neurological:      Mental Status: She is oriented to person, place, and time.   Psychiatric:         Behavior: Behavior normal.          Additional Data:     Labs:  Results from last 7 days   Lab Units 24  0527   WBC Thousand/uL 12.34*   HEMOGLOBIN g/dL 13.1   HEMATOCRIT % 39.5   PLATELETS Thousands/uL 255   NEUTROS PCT % 70   LYMPHS PCT % 18   MONOS PCT % 8   EOS PCT % 2     Results from last 7 days   Lab Units 24  0823   SODIUM mmol/L 139   POTASSIUM mmol/L 3.2*   CHLORIDE mmol/L 106   CO2 mmol/L 27   BUN mg/dL 8   CREATININE mg/dL 0.61   ANION GAP mmol/L 6   CALCIUM mg/dL 8.2*   ALBUMIN g/dL 3.1*   TOTAL BILIRUBIN mg/dL 0.62   ALK PHOS U/L 150*   ALT U/L 127*   AST U/L 25   GLUCOSE RANDOM mg/dL 105                 Results from last 7 days   Lab Units 02/15/24  0935   LACTIC ACID mmol/L  2.0       Lines/Drains:  Invasive Devices       Peripheral Intravenous Line  Duration             Peripheral IV 02/19/24 Right Hand <1 day              Line  Duration             Pump Device  -- days                          Imaging: Reviewed radiology reports from this admission including: procedure reports    Recent Cultures (last 7 days):   Results from last 7 days   Lab Units 02/16/24  1117 02/15/24  0932   BLOOD CULTURE  No Growth at 72 hrs.  No Growth at 72 hrs. No Growth After 4 Days.  Klebsiella oxytoca*   GRAM STAIN RESULT   --  Gram negative rods*       Last 24 Hours Medication List:   Current Facility-Administered Medications   Medication Dose Route Frequency Provider Last Rate    acetaminophen  650 mg Oral Q8H [facility] Eagle Babin MD      ampicillin-sulbactam  3 g Intravenous Q6H Eagle Babin MD 3 g (02/19/24 1525)    bisacodyl  10 mg Oral Daily Shady Allyson Montano MD      butalbital-acetaminophen-caffeine  1 tablet Oral Q4H PRN Miles Holm, DO      diphenhydrAMINE  50 mg Intravenous Q4H PRN Miles Holm, DO      famotidine  40 mg Oral BID Eagle Babin MD      folic acid  1 mg Oral Daily Miles Holm, DO      HYDROmorphone  0.5 mg Intravenous Q4H PRN Elizabeth Bose PA-C      HYDROmorphone  1 mg Intravenous Q4H PRN Eagle Babin MD      hydrOXYzine HCL  15 mg Oral Daily Miles Holm, DO      levothyroxine  75 mcg Oral Every Other Day Miles Holm, DO      liothyronine  5 mcg Oral Daily Miles Holm, DO      loratadine  10 mg Oral Daily Miles Holm, DO      methylPREDNISolone sodium succinate  40 mg Intravenous Daily Billy Loredo MD      mirtazapine  15 mg Oral HS Miles Holm, DO      montelukast  10 mg Oral HS Miles Holm, DO      ondansetron  4 mg Intravenous Q6H PRN Miles Holm, DO      oxybutynin  5 mg Oral BID PRN Miles Holm, DO      scopolamine  1 patch Transdermal Q72H Ruben Guadarrama DO          Today, Patient Was Seen By: Vipul Colindres  Kerri Partida MD    **Please Note: This note may have been constructed using a voice recognition system.**

## 2024-02-20 LAB
ALBUMIN SERPL BCP-MCNC: 3.2 G/DL (ref 3.5–5)
ALP SERPL-CCNC: 148 U/L (ref 34–104)
ALT SERPL W P-5'-P-CCNC: 112 U/L (ref 7–52)
ANION GAP SERPL CALCULATED.3IONS-SCNC: 9 MMOL/L
AST SERPL W P-5'-P-CCNC: 35 U/L (ref 13–39)
BACTERIA BLD CULT: NORMAL
BASOPHILS # BLD AUTO: 0.08 THOUSANDS/ÂΜL (ref 0–0.1)
BASOPHILS NFR BLD AUTO: 1 % (ref 0–1)
BILIRUB DIRECT SERPL-MCNC: 0.2 MG/DL (ref 0–0.2)
BILIRUB SERPL-MCNC: 0.64 MG/DL (ref 0.2–1)
BUN SERPL-MCNC: 9 MG/DL (ref 5–25)
CALCIUM SERPL-MCNC: 8.1 MG/DL (ref 8.4–10.2)
CHLORIDE SERPL-SCNC: 105 MMOL/L (ref 96–108)
CO2 SERPL-SCNC: 26 MMOL/L (ref 21–32)
CREAT SERPL-MCNC: 0.52 MG/DL (ref 0.6–1.3)
CRP SERPL QL: 7.6 MG/L
EOSINOPHIL # BLD AUTO: 0.23 THOUSAND/ÂΜL (ref 0–0.61)
EOSINOPHIL NFR BLD AUTO: 2 % (ref 0–6)
ERYTHROCYTE [DISTWIDTH] IN BLOOD BY AUTOMATED COUNT: 16.2 % (ref 11.6–15.1)
GFR SERPL CREATININE-BSD FRML MDRD: 111 ML/MIN/1.73SQ M
GLUCOSE SERPL-MCNC: 88 MG/DL (ref 65–140)
HCT VFR BLD AUTO: 38.6 % (ref 34.8–46.1)
HGB BLD-MCNC: 12.8 G/DL (ref 11.5–15.4)
IMM GRANULOCYTES # BLD AUTO: 0.08 THOUSAND/UL (ref 0–0.2)
IMM GRANULOCYTES NFR BLD AUTO: 1 % (ref 0–2)
LYMPHOCYTES # BLD AUTO: 2.61 THOUSANDS/ÂΜL (ref 0.6–4.47)
LYMPHOCYTES NFR BLD AUTO: 23 % (ref 14–44)
MCH RBC QN AUTO: 32.7 PG (ref 26.8–34.3)
MCHC RBC AUTO-ENTMCNC: 33.2 G/DL (ref 31.4–37.4)
MCV RBC AUTO: 99 FL (ref 82–98)
MONOCYTES # BLD AUTO: 1.1 THOUSAND/ÂΜL (ref 0.17–1.22)
MONOCYTES NFR BLD AUTO: 10 % (ref 4–12)
NEUTROPHILS # BLD AUTO: 7.25 THOUSANDS/ÂΜL (ref 1.85–7.62)
NEUTS SEG NFR BLD AUTO: 63 % (ref 43–75)
NRBC BLD AUTO-RTO: 0 /100 WBCS
PLATELET # BLD AUTO: 377 THOUSANDS/UL (ref 149–390)
PMV BLD AUTO: 9.5 FL (ref 8.9–12.7)
POTASSIUM SERPL-SCNC: 3.4 MMOL/L (ref 3.5–5.3)
PROT SERPL-MCNC: 5.7 G/DL (ref 6.4–8.4)
RBC # BLD AUTO: 3.91 MILLION/UL (ref 3.81–5.12)
SODIUM SERPL-SCNC: 140 MMOL/L (ref 135–147)
WBC # BLD AUTO: 11.35 THOUSAND/UL (ref 4.31–10.16)

## 2024-02-20 PROCEDURE — 99232 SBSQ HOSP IP/OBS MODERATE 35: CPT | Performed by: INTERNAL MEDICINE

## 2024-02-20 PROCEDURE — 80076 HEPATIC FUNCTION PANEL: CPT | Performed by: INTERNAL MEDICINE

## 2024-02-20 PROCEDURE — 99233 SBSQ HOSP IP/OBS HIGH 50: CPT | Performed by: STUDENT IN AN ORGANIZED HEALTH CARE EDUCATION/TRAINING PROGRAM

## 2024-02-20 PROCEDURE — 86140 C-REACTIVE PROTEIN: CPT | Performed by: INTERNAL MEDICINE

## 2024-02-20 PROCEDURE — 85025 COMPLETE CBC W/AUTO DIFF WBC: CPT | Performed by: INTERNAL MEDICINE

## 2024-02-20 PROCEDURE — 99223 1ST HOSP IP/OBS HIGH 75: CPT | Performed by: STUDENT IN AN ORGANIZED HEALTH CARE EDUCATION/TRAINING PROGRAM

## 2024-02-20 PROCEDURE — 80048 BASIC METABOLIC PNL TOTAL CA: CPT | Performed by: INTERNAL MEDICINE

## 2024-02-20 RX ORDER — ENOXAPARIN SODIUM 100 MG/ML
40 INJECTION SUBCUTANEOUS
Status: DISCONTINUED | OUTPATIENT
Start: 2024-02-21 | End: 2024-03-07 | Stop reason: HOSPADM

## 2024-02-20 RX ORDER — DICYCLOMINE HCL 20 MG
20 TABLET ORAL
Status: DISCONTINUED | OUTPATIENT
Start: 2024-02-20 | End: 2024-02-24

## 2024-02-20 RX ADMIN — DICYCLOMINE HYDROCHLORIDE 20 MG: 20 TABLET ORAL at 21:29

## 2024-02-20 RX ADMIN — DIPHENHYDRAMINE HYDROCHLORIDE 50 MG: 50 INJECTION, SOLUTION INTRAMUSCULAR; INTRAVENOUS at 13:03

## 2024-02-20 RX ADMIN — SODIUM CHLORIDE 3 G: 9 INJECTION, SOLUTION INTRAVENOUS at 09:59

## 2024-02-20 RX ADMIN — MIRTAZAPINE 15 MG: 15 TABLET, FILM COATED ORAL at 21:29

## 2024-02-20 RX ADMIN — HYDROMORPHONE HYDROCHLORIDE 1 MG: 0.5 INJECTION, SOLUTION INTRAMUSCULAR; INTRAVENOUS; SUBCUTANEOUS at 15:36

## 2024-02-20 RX ADMIN — ACETAMINOPHEN 325MG 650 MG: 325 TABLET ORAL at 14:57

## 2024-02-20 RX ADMIN — ACETAMINOPHEN 325MG 650 MG: 325 TABLET ORAL at 05:30

## 2024-02-20 RX ADMIN — DIPHENHYDRAMINE HYDROCHLORIDE 50 MG: 50 INJECTION, SOLUTION INTRAMUSCULAR; INTRAVENOUS at 04:21

## 2024-02-20 RX ADMIN — HYDROMORPHONE HYDROCHLORIDE 0.5 MG: 1 INJECTION, SOLUTION INTRAMUSCULAR; INTRAVENOUS; SUBCUTANEOUS at 04:25

## 2024-02-20 RX ADMIN — HYDROMORPHONE HYDROCHLORIDE 1 MG: 0.5 INJECTION, SOLUTION INTRAMUSCULAR; INTRAVENOUS; SUBCUTANEOUS at 21:33

## 2024-02-20 RX ADMIN — ACETAMINOPHEN 325MG 650 MG: 325 TABLET ORAL at 21:29

## 2024-02-20 RX ADMIN — LIOTHYRONINE SODIUM 5 MCG: 5 TABLET ORAL at 05:30

## 2024-02-20 RX ADMIN — MONTELUKAST 10 MG: 10 TABLET, FILM COATED ORAL at 21:29

## 2024-02-20 RX ADMIN — HYDROXYZINE HYDROCHLORIDE 15 MG: 10 TABLET ORAL at 08:57

## 2024-02-20 RX ADMIN — BISACODYL 10 MG: 5 TABLET, COATED ORAL at 08:57

## 2024-02-20 RX ADMIN — HYDROMORPHONE HYDROCHLORIDE 0.5 MG: 1 INJECTION, SOLUTION INTRAMUSCULAR; INTRAVENOUS; SUBCUTANEOUS at 09:00

## 2024-02-20 RX ADMIN — DICYCLOMINE HYDROCHLORIDE 20 MG: 20 TABLET ORAL at 16:32

## 2024-02-20 RX ADMIN — OXYBUTYNIN CHLORIDE 5 MG: 5 TABLET ORAL at 11:13

## 2024-02-20 RX ADMIN — SODIUM CHLORIDE 3 G: 9 INJECTION, SOLUTION INTRAVENOUS at 14:58

## 2024-02-20 RX ADMIN — ONDANSETRON 4 MG: 2 INJECTION INTRAMUSCULAR; INTRAVENOUS at 15:41

## 2024-02-20 RX ADMIN — FAMOTIDINE 40 MG: 20 TABLET ORAL at 08:59

## 2024-02-20 RX ADMIN — SODIUM CHLORIDE 3 G: 9 INJECTION, SOLUTION INTRAVENOUS at 21:40

## 2024-02-20 RX ADMIN — FOLIC ACID 1 MG: 1 TABLET ORAL at 08:58

## 2024-02-20 RX ADMIN — SODIUM CHLORIDE 3 G: 9 INJECTION, SOLUTION INTRAVENOUS at 03:15

## 2024-02-20 RX ADMIN — FAMOTIDINE 40 MG: 20 TABLET ORAL at 17:08

## 2024-02-20 RX ADMIN — HYDROMORPHONE HYDROCHLORIDE 0.5 MG: 1 INJECTION, SOLUTION INTRAMUSCULAR; INTRAVENOUS; SUBCUTANEOUS at 18:27

## 2024-02-20 RX ADMIN — DIPHENHYDRAMINE HYDROCHLORIDE 50 MG: 50 INJECTION, SOLUTION INTRAMUSCULAR; INTRAVENOUS at 18:27

## 2024-02-20 RX ADMIN — LEVOTHYROXINE SODIUM 75 MCG: 75 TABLET ORAL at 05:30

## 2024-02-20 RX ADMIN — DIPHENHYDRAMINE HYDROCHLORIDE 50 MG: 50 INJECTION, SOLUTION INTRAMUSCULAR; INTRAVENOUS at 09:00

## 2024-02-20 RX ADMIN — HYDROMORPHONE HYDROCHLORIDE 1 MG: 0.5 INJECTION, SOLUTION INTRAMUSCULAR; INTRAVENOUS; SUBCUTANEOUS at 11:14

## 2024-02-20 RX ADMIN — DIPHENHYDRAMINE HYDROCHLORIDE 50 MG: 50 INJECTION, SOLUTION INTRAMUSCULAR; INTRAVENOUS at 22:40

## 2024-02-20 RX ADMIN — METHYLPREDNISOLONE SODIUM SUCCINATE 40 MG: 40 INJECTION, POWDER, FOR SOLUTION INTRAMUSCULAR; INTRAVENOUS at 08:59

## 2024-02-20 RX ADMIN — HYDROMORPHONE HYDROCHLORIDE 0.5 MG: 1 INJECTION, SOLUTION INTRAMUSCULAR; INTRAVENOUS; SUBCUTANEOUS at 13:03

## 2024-02-20 NOTE — CONSULTS
Consultation - Infectious Disease   Celine Roa 48 y.o. female MRN: [id number]  Unit/Bed#: [address] 208-01 Encounter: [id number]    IMPRESSION & RECOMMENDATIONS:   1. Sepsis. Evolving after admission. Secondary to klebsiella bacteremia which I suspect was transient process during patient's ERCP/sphincterotomy. No other clear source appreciated. CT A/P imaging with no acute infectious findings. Despite becoming systemically ill she remains hemodynamically stable. Patient's fever curve and WBC count have trended down. Repeat blood cultures are negative >72 hours.  -antibiotic as below  -monitor CBCD and CMP  -monitor vitals  -supportive care    2. Klebsiella oxytoca bacteremia. Suspect this was transient process occurring during patient's ERCP/sphincterotomy. Patient was started on Zosyn and then transitioned to Unasyn. She's been tolerating the antibiotic without difficulty. Repeat blood cultures were sent and are negative >72 hours. Given patient's allergy history, and aversion to swallowing pills, recommend she complete her 7 day treatment course with the IV unasyn.  -continue IV unasyn through 2/21/2024 for 7 days of antibiotic treatment  -monitor CBCD and CMP  -follow up repeat blood cultures  -monitor vitals    3. R sided abdominal pain, likely cholangitis. I personally reviewed patient's CT imaging which showed intrahepatic and extrahepatic biliary ductal dilation. Additionally showed on follow up MRI/MRCP. Patient has been following closely with GI who performed ERCP on 2/14/2024. Existing sphincterectomy was extended and balloon sweeping removed small amount of sludge. Suspect this was start of patient's bacteremia above. The patient continue to have significant abdominal pain with waves of nausea. Gastroenterology continues to follow.  -serial abdominal exams  -monitor GI symptoms  -continue follow up with gastroenterology     4. Mast cell activation syndrome. Patient continues to receive Benadryl and  medication list being monitored closely to avoid patient's allergies.    5. Crohn's colitis and IBD. Patient with complex history, is managed chronically on Skyrizi and Methotrexate. This is risk factor for infection. Is currently also receiving Solumedrol. She is following with gastroenterology.  -continue follow up with gastroenterology    6. Antibiotic allergies. Per patient some of her past reactions, and recent reaction to Ceftriaxone, likely due to the fluid the antibiotic is administered with. She reports SOB and swelling with Penicillin Notatum and Hives with sulfa antibiotics. With SOB with Ceftriaxone earlier in this hospitalization. We will avoid these antibiotics for now. She has been tolerating zosyn and unasyn during this hospitalization without difficulty.  -monitor patient for adverse medication reactions    Given there are no ongoing infectious disease concerns the infectious disease team will sign off at this time. Please call with new questions or concerns.  Above plan was discussed in detail with patient at the bedside.  Above plan was discussed in detail with SLIM who agrees with use of ongoing IV antibiotic.    HISTORY OF PRESENT ILLNESS:  Reason for Consult: sepsis without acute organ dysfunction due to unspecified organism  HPI: Celine Roa is a 48 y.o. year old female who initially presented to the [facility] ED on 2/7/2024 with RUQ pain which had been progressing x5 days. Was also feeling very bloated and nauseous, had dry heaving, and fever earlier in the week. Patient with Crohn's history, notified her GI specialist of her symptoms who encouraged her to go to the ED for evaluation.    Upon arrival to the ED the patient's temperature was 97.6. HR was 107. RR was 18. O2 saturation was 98% on room air. BP Was 133/90. WBC count was 11.49. Lactic acid was 1.3. Creatinine was 0.84 with GFR = 81. LFTs were normal. COVID-19/Flu/RSV PCR was negative. She completed a CT C/A/P which showed  intrahepatic and extrahepatic biliary ductal dilation, degenerative L3 endplate deformity, and B/L nonobstructing renal calculi. The patient was admitted for additional medical management.    After her admission she was assessed by gastroenterology. MRI/MRCP was recommended for further evaluation. She developed bladder spasms. Urology was contacted who recommended patient trial ditropan. MRI was completed on 2/9/2024 and showed intrahepatic and extrahepatic biliary ductal dilation without evidence of stone or obstructing mass. GI was concerned for possibility of biliary stricture and recommend boomerang/transfer for ERCP.     On 2/13/2024 the patient was transferred to [facility]. She underwent ERCP on 2/14/2024. Existing sphincterectomy was extended and balloon sweeping removed small amount of sludge. The following day patient had fever, tachycardia, tachypnea, and increase in her WBC count. Blood cultures were sent. She completed CT A/P which showed no acute findings. She was started on Zosyn. Blood cultures then positive for klebsiella oxytoca. Antibiotic was transitioned to ceftriaxone but patient reported SOB and reported she was having anaphylaxis. Antibiotic was changed to Unasyn. We have been asked for formal consult for sepsis without acute organ dysfunction due to unspecified organism.    The patient has INDERJIT, mast cell disease, migraines, asthma, psychiatric disorder, RA, UC, Crohn's colitis, thyroid disease, anemia, CKD, colon polyps, seizures, IBS, murmer, GERD, lactose intolerance, IBD, and disorder of sphincter of Oddi. She has a history of DVT, pancreatitis, MI, bild duct stricture, GI bleed, appendectomy, ORIF L ankle fracture, L knee debridement and shaving of cartilage, EGD, cholecystectomy, cystoscopty, hysterectomy, ERCP, endometriosis fulguration, colonoscopy, abdominal surgery,  diagnostic laparoscopy with lysis of adhesions, and L meniscectomy. She has allergies to aimovig,  amitriptyline, aspergillus skin test, azathioprine, buspar, citric acid, corn dextrin, erenumab, gadolinium, gelatin, heparin, lavender oil, malto dextrin, gel caps, maltodextrose, grapes, penicillin notatum, red dye, sumatriptan, ustekinumab, iodinated contrast media, corn oil, humira, ibuprofen, polyethylene glycol, remicade, sulfa antibiotics, sulfites, sweet corn extract skin test, freederm adhesive removed, histamine, and wound dressings.    REVIEW OF SYSTEMS:  Patient reports she's continuing to have significant abdominal pain. Reports pain is mostly in the RUQ region but she additionally feels some shifting gas pains. She reports no BM since last Monday, 2/12/2024, but tells me she did start ducolax yesterday. Does not feel urgency to have a BM at time of my visit. She has some occasional nausea but it seems random and isn't associated with eating. She does feel that eating increases her abdominal pain. She has no fever. She has no acute respiratory complaints. A complete 12 point system-based review of systems is otherwise negative.    PAST MEDICAL HISTORY:  Past Medical History:   Diagnosis Date    Anemia 2007    Anxiety     Asthma     Bile duct stricture 2014    Sphincter of oddi dysfunction    Chronic kidney disease     Colon polyp 1998    Crohn's colitis (HCC)     Deep vein thrombosis (HCC)     Disease of thyroid gland     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 pt denies, documented in cardio note    Pancreatitis     sphinger of     Psychiatric disorder     RA (rheumatoid arthritis) (HCC)     Seizures (HCC)     Ulcerative colitis (HCC)     Visual impairment      Past Surgical History:    Procedure Laterality Date    ABDOMINAL SURGERY  2017    APPENDECTOMY      CHOLECYSTECTOMY      COLONOSCOPY  2019    CYSTOSCOPY N/A 6/8/2023    Procedure: CYSTOSCOPY, mini TURBT with fulgeration;  Surgeon: Tee Bruno MD;  Location: [facility];  Service: Urology    EGD AND COLONOSCOPY N/A 09/12/2017    Procedure: EGD AND COLONOSCOPY;  Surgeon: Tommy Oliver MD;  Location:  GI LAB;  Service: Gastroenterology    ERCP N/A 09/15/2017    Procedure: ENDOSCOPIC RETROGRADE CHOLANGIOPANCREATOGRAPHY (ERCP);  Surgeon: Dre Soto MD;  Location: [facility];  Service: Gastroenterology    HYSTERECTOMY      KNEE SURGERY Right     LAPAROSCOPIC ENDOMETRIOSIS FULGURATION      ORIF ANKLE FRACTURE Left     MT ARTHRS KNE SURG W/MENISCECTOMY MED/LAT W/SHVG Left 05/12/2017    Procedure: POSSIBLE MEDIAL MENISECTOMY;  Surgeon: Kristian Avila MD;  Location: [facility];  Service: Orthopedics    MT ARTHRS KNEE DEBRIDEMENT/SHAVING ARTCLR CRTLG Left 05/12/2017    Procedure: KNEE ARTHROSCOPY EVALUATION, CHONDROPLASTY;  Surgeon: Kristian Avila MD;  Location: [facility];  Service: Orthopedics    MT LAPS ABD PRTM&OMENTUM DX W/WO SPEC BR/WA SPX N/A 12/12/2017    Procedure: LAPAROSCOPY DIAGNOSTIC  WITH LYSIS OF ADHESIONS;  Surgeon: Olaf John DO;  Location:  MAIN OR;  Service: General    UPPER GASTROINTESTINAL ENDOSCOPY  2019     FAMILY HISTORY:  Non-contributory    SOCIAL HISTORY:  Social History   Social History     Substance and Sexual Activity   Alcohol Use Never     Social History     Substance and Sexual Activity   Drug Use Never     Social History     Tobacco Use   Smoking Status Never   Smokeless Tobacco Never     ALLERGIES:  Allergies   Allergen Reactions    Aimovig [Erenumab-Aooe] Anaphylaxis    Amitriptyline Anaphylaxis     According to the patient she had nphylaxis    Aspergillus Allergy Skin Test Other (See Comments), Hives and Swelling    Azathioprine Other (See Comments) and Anaphylaxis     pancreatitis  According to patient she  "needed Epipen    Buspar [Buspirone] Hives and Shortness Of Breath    Citric Acid-Polysorbate 80 Abdominal Pain, Anaphylaxis, Anxiety, Bradycardia, Diarrhea, Fatigue, GI Intolerance, Headache, Hives, Hyperactivity, Itching, Lip Swelling, Nausea Only, Palpitations, Rash, Shortness Of Breath, Tachycardia, Throat Swelling, Tongue Swelling and Vomiting    Corn Dextrin Anaphylaxis     Any corn based products    Erenumab Anaphylaxis     patient sent portal message stating she had anaphylaxis  patient sent portal message stating she had anaphylaxis      Gadolinium Abdominal Pain, Anaphylaxis, Anxiety, Confusion, Delirium, Dizziness, Eye Swelling, Fatigue, GI Intolerance, Headache, Hives, Irritability, Itching, Lip Swelling, Nausea Only, Palpitations, Photosensitivity, Rash, Seizures, Shortness Of Breath, Swelling, Syncope, Throat Swelling, Tongue Swelling, Tremor, Visual Disturbance and Vomiting    Gelatin - Food Allergy Anaphylaxis    Heparin Hives     Painful welts     Lavender Oil Anaphylaxis     Other reaction(s): anaphalxis    Malto Dextrin [Dextrin] Anaphylaxis    Other Anaphylaxis     Gel caps, maltodextrose, dextrose,grapes, lavender     Penicillium Notatum Shortness Of Breath and Swelling    Red Dye - Food Allergy Anaphylaxis and Other (See Comments)     intolerance    Sumatriptan Anaphylaxis     Bradycardia, sob, back pressure, head pain,throat tightness  According to the patient she had anphylaxis    Ustekinumab Anaphylaxis    Contrast [Iodinated Contrast Media] Other (See Comments)     Pt states needs to be premedicated prior to injection    Corn Oil - Food Allergy - Food Allergy Hives    Humira [Adalimumab] Other (See Comments)     \"I develop drug induced lupus\"    Ibuprofen Hives and Throat Swelling    Polyethylene Glycol Diarrhea and Vomiting    Remicade [Infliximab] Other (See Comments)     \"I develop drug induced lupus\"    Sulfa Antibiotics Hives and Other (See Comments)    Sulfate Hives    Sulfites - Food " Allergy Hives    Sweet Corn Extract Skin Test - Food Allergy Hives and Itching    Chlorhexidine Itching    Freederm Adhesive Remover [New Skin] Rash    Histamine Hives, Rash and Other (See Comments)     Intolerance      Wound Dressings Rash     MEDICATIONS:  All current active medications have been reviewed.    ANTIBIOTICS:  Unasyn 2  Antibiotics 6    PHYSICAL EXAM:  Temp:  [98.3 °F (36.8 °C)-98.5 °F (36.9 °C)] 98.5 °F (36.9 °C)  HR:  [60-74] 60  Resp:  [15-18] 18  BP: (106-114)/(65-79) 106/65  SpO2:  [92 %-97 %] 92 %  Temp (24hrs), Av.4 °F (36.9 °C), Min:98.3 °F (36.8 °C), Max:98.5 °F (36.9 °C)  Current: Temperature: 98.5 °F (36.9 °C)  No intake or output data in the 24 hours ending 24 0714    General Appearance:  Appearing well, nontoxic, and in no distress. She appears comfortable sitting up in bed.   Head:  Normocephalic, without obvious abnormality, atraumatic.   Eyes:  Conjunctiva pink and sclera anicteric, both eyes.   Nose: Nares normal, mucosa normal, no drainage.   Throat: Oropharynx moist without lesions.   Lungs:   Clear to auscultation bilaterally, respirations unlabored on room air.   Chest Wall:  No tenderness or deformity.   Heart:  RRR; no murmur, rub or gallop.   Abdomen:   Soft, non-distended, very hyperactive bowel sounds throughout. Remains tender over RUQ.   Extremities: No cyanosis or clubbing, no edema.   Skin: No rashes noted on exposed skin.   Neurologic: Alert and oriented times 3, follows commands, speech is organized and appropriate, symmetric facial movement.     LABS, IMAGING, & OTHER STUDIES:  Lab Results:  I have personally reviewed pertinent labs.  Results from last 7 days   Lab Units 24  0507 24  0527 24  0521   WBC Thousand/uL 11.35* 12.34* 11.71*   HEMOGLOBIN g/dL 12.8 13.1 12.9   PLATELETS Thousands/uL 377 255 355     Results from last 7 days   Lab Units 24  0507 24  0823 24  0521 24  0535   POTASSIUM mmol/L 3.4* 3.2* 3.6 3.7    CHLORIDE mmol/L 105 106 105 107   CO2 mmol/L 26 27 26 22   BUN mg/dL 9 8 6 2*   CREATININE mg/dL 0.52* 0.61 0.67 0.58*   EGFR ml/min/1.73sq m 111 106 102 107   CALCIUM mg/dL 8.1* 8.2* 8.3* 7.5*   AST U/L  --  25 36 87*   ALT U/L  --  127* 179* 253*   ALK PHOS U/L  --  150* 175* 184*     Results from last 7 days   Lab Units 02/16/24  1117 02/15/24  0932   BLOOD CULTURE  No Growth at 72 hrs.  No Growth at 72 hrs. No Growth After 4 Days.  Klebsiella oxytoca*   GRAM STAIN RESULT   --  Gram negative rods*     Imaging Studies:   I have personally reviewed pertinent imaging study reports and images in PACS.    CT ABDOMEN PELVIS W CONTRAST 2/15/2024 - I personally reviewed this study which showed no abnormality in the lower chest. Unchanged biliary ductal dilation when compared to previous imaging, now with pneumobilia in setting of recent sphincterotomy. Gallbladder is surgically absent. Spleen and pancreas are unremarkable. Kidneys with small non-obstructing calculi. Stomach and bowel are unremarkable. Bladder is unremarkable.     US KIDNEY AND BLADDER 2/9/2024 - I personally reviewed this study which showed normal B/L renal system. No masses, hydronephrosis, or perinephric collections.    MRI ABDOMEN WO CONTRAST AND MRCP 2/9/2024 - I personally reviewed this study which showed unremarkable lower chest. Liver without suspicious mass. Unchanged intrahepatic and extrahepatic biliary ductal dilation without evidence of CBD stone or mass. Pancreas and spleen are unremarkable. Kidneys without hydronephrosis. No abdominal lymphadenopathy.     CT CHEST ABDOMEN PELVIS WO CONTRAST 2/7/2024 - I personally reviewed this study which showed clear lungs. Mild intrahepatic and extrahepatic biliary ductal dilation. Gallbladder is surgically absent. Spleen and pancreas are unremarkable. Kidneys with non-obstructing renal calculi. Abdominopelvic cavity without ascites. Stomach and bowel are unremarkable. Urinary bladder is  unremarkable.     Other Studies:   I have personally reviewed pertinent reports:  02/07/2024 1721 02/07/2024 1806 FLU/RSV/COVID - if FLU/RSV clinically relevant [[id number]]    Nares from Nose    Final result [facility] - copy/paste COVID Guidelines URL to browser: [email or web address]   SARS-CoV-2 assay is a Nucleic Acid Amplification assay intended for the   qualitative detection of nucleic acid from SARS-CoV-2 in nasopharyngeal   swabs. Results are for the presumptive identification of SARS-CoV-2 RNA.   Positive results are indicative of infection with SARS-CoV-2, the virus   causing COVID-19, but do not rule out bacterial infection or co-infection   with other viruses. Laboratories within the [place] and its   territories are required to report all positive results to the appropriate   public health authorities. Negative results do not preclude SARS-CoV-2   infection and should not be used as the sole basis for treatment or other   patient management decisions. Negative results must be combined with   clinical observations, patient history, and epidemiological information.   This test has not been FDA cleared or approved.   This test has been authorized by FDA under an Emergency Use Authorization   (EUA). This test is only authorized for the duration of time the   declaration that circumstances exist justifying the authorization of the   emergency use of an in vitro diagnostic tests for detection of SARS-CoV-2   virus and/or diagnosis of COVID-19 infection under section 564(b)(1) of   the Act, 21 U.S.C. 360bbb-3(b)(1), unless the authorization is terminated   or revoked sooner. The test has been validated but independent review by FDA   and CLIA is pending.   Test performed using [employer] GeneXpert: This RT-PCR assay targets N2,   a region unique to SARS-CoV-2. A conserved region in the E-gene was chosen    for pan-Sarbecovirus detection which includes SARS-CoV-2.   According to CMS-2020-01-R, this platform meets the definition of high-throughput technology.    Component Value   SARS-CoV-2 Negative   INFLUENZA A PCR Negative   INFLUENZA B PCR Negative   RSV PCR Negative

## 2024-02-20 NOTE — PLAN OF CARE
Problem: PAIN - ADULT  Goal: Verbalizes/displays adequate comfort level or baseline comfort level  Description: Interventions:  - Encourage patient to monitor pain and request assistance  - Assess pain using appropriate pain scale  - Administer analgesics based on type and severity of pain and evaluate response  - Implement non-pharmacological measures as appropriate and evaluate response  - Consider cultural and social influences on pain and pain management  - Notify physician/advanced practitioner if interventions unsuccessful or patient reports new pain  Outcome: Progressing     Problem: Knowledge Deficit  Goal: Patient/family/caregiver demonstrates understanding of disease process, treatment plan, medications, and discharge instructions  Description: Complete learning assessment and assess knowledge base.  Interventions:  - Provide teaching at level of understanding  - Provide teaching via preferred learning methods  Outcome: Progressing     Problem: GASTROINTESTINAL - ADULT  Goal: Minimal or absence of nausea and/or vomiting  Description: INTERVENTIONS:  - Administer IV fluids if ordered to ensure adequate hydration  - Maintain NPO status until nausea and vomiting are resolved  - Nasogastric tube if ordered  - Administer ordered antiemetic medications as needed  - Provide nonpharmacologic comfort measures as appropriate  - Advance diet as tolerated, if ordered  - Consider nutrition services referral to assist patient with adequate nutrition and appropriate food choices  Outcome: Progressing  Goal: Maintains or returns to baseline bowel function  Description: INTERVENTIONS:  - Assess bowel function  - Encourage oral fluids to ensure adequate hydration  - Administer IV fluids if ordered to ensure adequate hydration  - Administer ordered medications as needed  - Encourage mobilization and activity  - Consider nutritional services referral to assist patient with adequate nutrition and appropriate food  choices  Outcome: Progressing  Goal: Maintains adequate nutritional intake  Description: INTERVENTIONS:  - Monitor percentage of each meal consumed  - Identify factors contributing to decreased intake, treat as appropriate  - Assist with meals as needed  - Monitor I&O, weight, and lab values if indicated  - Obtain nutrition services referral as needed  Outcome: Progressing     Problem: DISCHARGE PLANNING  Goal: Discharge to home or other facility with appropriate resources  Description: INTERVENTIONS:  - Identify barriers to discharge w/patient and caregiver  - Arrange for needed discharge resources and transportation as appropriate  - Identify discharge learning needs (meds, wound care, etc.)  - Arrange for interpretive services to assist at discharge as needed  - Refer to Case Management Department for coordinating discharge planning if the patient needs post-hospital services based on physician/advanced practitioner order or complex needs related to functional status, cognitive ability, or social support system  Outcome: Progressing     Problem: METABOLIC, FLUID AND ELECTROLYTES - ADULT  Goal: Fluid balance maintained  Description: INTERVENTIONS:  - Monitor labs   - Monitor I/O and WT  - Instruct patient on fluid and nutrition as appropriate  - Assess for signs & symptoms of volume excess or deficit  Outcome: Progressing

## 2024-02-20 NOTE — ASSESSMENT & PLAN NOTE
Corn allergy noted   Multiple drug allergies noted but tolerated zosyn and unasyn  She is also on benadryl 50 mg IV Q4 prn

## 2024-02-20 NOTE — PROGRESS NOTES
[facility]  Progress Note  Name: Celine Roa I  MRN: [id number]  Unit/Bed#: [place] [address] -01 I Date of Admission: 2/13/2024   Date of Service: 2/20/2024 I Hospital Day: 7    Assessment/Plan   * Abdominal pain  Assessment & Plan  Persistent RUQ pain  S/p ERCP and sphincterotomy  MRCP pending   Caution pain control  Trial of bentyl. OK with GI. Discussed with pharmacy (Alert due to corn allergy. OK to use per pharmacy)    Sepsis without acute organ dysfunction (HCC)  Assessment & Plan  She has klebsiella oxytoca bacteremia from biliary tract source  She has multiple drug allergies  ID recommendations reviewed  She will finish her last dose of antibiotic tomorrow (7 days total)    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HCC)  Assessment & Plan  Corn allergy noted   Multiple drug allergies noted but tolerated zosyn and unasyn  She is also on benadryl 50 mg IV Q4 prn    GERD without esophagitis  Assessment & Plan  Continue Pepcid    Crohn's colitis (HCC)  Assessment & Plan  History of Crohn's disease on Skyrizi as well as methotrexate  Defer steroid taper to GI             VTE Pharmacologic Prophylaxis: VTE Score: 3 clarifying with pharmacy to make sure she tolerated lovenox before      Patient Centered Rounds: I performed bedside rounds with nursing staff today.   Discussions with Specialists or Other Care Team Provider: PADILLA Montano, CARLO Grover    Education and Discussions with Family / Patient: Patient declined call to .     Current Length of Stay: 7 day(s)  Current Patient Status: Inpatient   Certification Statement: The patient will continue to require additional inpatient hospital stay due to RUQ pain   Discharge Plan: Anticipate discharge in 48 hrs to home with home services.    Code Status: Level 1 - Full Code    Subjective:   + RUQ pain   No vomit  No fever  She asked if we can try a muscle relaxer or bentyl  She said she has had heparin before    Objective:     Vitals:    Temp (24hrs), Av.9 °F (36.6 °C), Min:97.3 °F (36.3 °C), Max:98.5 °F (36.9 °C)    Temp:  [97.3 °F (36.3 °C)-98.5 °F (36.9 °C)] 97.3 °F (36.3 °C)  HR:  [52-80] 80  Resp:  [18-20] 20  BP: (106-114)/(65-78) 114/78  SpO2:  [92 %-97 %] 97 %  Body mass index is 27.39 kg/m².     Input and Output Summary (last 24 hours):     Intake/Output Summary (Last 24 hours) at 2024 1543  Last data filed at 2024 1257  Gross per 24 hour   Intake --   Output 500 ml   Net -500 ml       Physical Exam:   Physical Exam  HENT:      Head: Normocephalic and atraumatic.      Nose: No congestion or rhinorrhea.   Eyes:      General: No scleral icterus.  Cardiovascular:      Rate and Rhythm: Normal rate and regular rhythm.   Pulmonary:      Breath sounds: No wheezing.   Abdominal:      Tenderness: There is abdominal tenderness.   Musculoskeletal:      Cervical back: Neck supple.      Right lower leg: No edema.      Left lower leg: No edema.   Skin:     General: Skin is warm and dry.   Neurological:      Mental Status: She is alert and oriented to person, place, and time.   Psychiatric:         Behavior: Behavior normal.        Additional Data:     Labs:  Results from last 7 days   Lab Units 24  0507   WBC Thousand/uL 11.35*   HEMOGLOBIN g/dL 12.8   HEMATOCRIT % 38.6   PLATELETS Thousands/uL 377   NEUTROS PCT % 63   LYMPHS PCT % 23   MONOS PCT % 10   EOS PCT % 2     Results from last 7 days   Lab Units 24  0507   SODIUM mmol/L 140   POTASSIUM mmol/L 3.4*   CHLORIDE mmol/L 105   CO2 mmol/L 26   BUN mg/dL 9   CREATININE mg/dL 0.52*   ANION GAP mmol/L 9   CALCIUM mg/dL 8.1*   ALBUMIN g/dL 3.2*   TOTAL BILIRUBIN mg/dL 0.64   ALK PHOS U/L 148*   ALT U/L 112*   AST U/L 35   GLUCOSE RANDOM mg/dL 88                 Results from last 7 days   Lab Units 02/15/24  0935   LACTIC ACID mmol/L 2.0       Lines/Drains:  Invasive Devices       Peripheral Intravenous Line  Duration             Peripheral IV 24 Right Hand 1 day     Peripheral IV 02/20/24 Right Wrist <1 day              Line  Duration             Pump Device  -- days                          Imaging: Reviewed radiology reports from this admission including: procedure reports    Recent Cultures (last 7 days):   Results from last 7 days   Lab Units 02/16/24  1117 02/15/24  0932   BLOOD CULTURE  No Growth at 72 hrs.  No Growth at 72 hrs. No Growth After 5 Days.  Klebsiella oxytoca*   GRAM STAIN RESULT   --  Gram negative rods*       Last 24 Hours Medication List:   Current Facility-Administered Medications   Medication Dose Route Frequency Provider Last Rate    acetaminophen  650 mg Oral Q8H [facility] Eagle Babin MD      ampicillin-sulbactam  3 g Intravenous Q6H Eagle Babin MD 3 g (02/20/24 4704)    bisacodyl  10 mg Oral Daily Shady Allyson Montano MD      butalbital-acetaminophen-caffeine  1 tablet Oral Q4H PRN Miles Holm, DO      diphenhydrAMINE  50 mg Intravenous Q4H PRN Miles Holm, DO      famotidine  40 mg Oral BID Eagle Babin MD      folic acid  1 mg Oral Daily Miles Holm, DO      HYDROmorphone  0.5 mg Intravenous Q4H PRN Elizabeth Bose PA-C      HYDROmorphone  1 mg Intravenous Q4H PRN Eagle Babin MD      hydrOXYzine HCL  15 mg Oral Daily Miles Holm, DO      levothyroxine  75 mcg Oral Every Other Day Miles Holm, DO      liothyronine  5 mcg Oral Daily Miles Holm, DO      loratadine  10 mg Oral Daily Miles Holm, DO      methylPREDNISolone sodium succinate  40 mg Intravenous Daily Billy Loredo MD      mirtazapine  15 mg Oral HS Miles Holm, DO      montelukast  10 mg Oral HS Miles Holm, DO      ondansetron  4 mg Intravenous Q6H PRN Miles Holm, DO      oxybutynin  5 mg Oral BID PRN Miles Holm, DO      scopolamine  1 patch Transdermal Q72H Ruben Guadarrama DO          Today, Patient Was Seen By: Vipul Partida MD    **Please Note: This note may have been constructed using a voice recognition  system.**

## 2024-02-20 NOTE — ASSESSMENT & PLAN NOTE
Persistent RUQ pain  S/p ERCP and sphincterotomy  MRCP pending   Caution pain control  Trial of bentyl. OK with GI

## 2024-02-20 NOTE — PROGRESS NOTES
[facility] - Progress Note  Celine Roa 48 y.o. 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     Cholangitis  S/p ERCP 2/14/24  Post procedurally w pain, bacterememia  Continue abx (currently on zosyn) for 10 day course  Abd Pain etiology unclear but suspect multifactorial in s/o possible IBD, Sphincter of Oddi dysfx, endometriosis   Will recheck MRCP today     IBD  Complex hx, previously followed w Dr. Levine  On skyrizi as an outpt, transition to Rinvoq being discussed by patient due to skyrizi infusions having corn syrup per messages from pt to IBD team  Currently on solumedrol 40 mg daily   Can transition to PO prednisone 50 mg daily today with taper by 10 mg q7 days:  Prednisone 50 mg daily until 2/26/24  Prednisone 40 mg daily from 2/27/24 to 3/4/24  Prednisone 30 mg daily from 3/5/24 to 3/11/24  Prednisone 20 mg daily from 3/12/24 to 3/18/24  Prednisone 10 mg daily from 3/19/24 to 3/25/24  From 3/26/24 until outpt IBD f/u continue 5 mg prednisone daily; pt will likely have had IBD appt in the interim     Constipation  Continue dulcolax 10 mg daily   Minimize opioids as much as possible - pt is receiving 0.5 mg dilaudid 4-5 times since 2/14/24 and additional 1 mg 2-5 times per day since 2/14/24. Suspect this is an etiology for her worsening constipation, and is associated with increased morbidity and mortality in IBD   Can consider methylnaltrexone IV for possible opioid induced constipation  Outpt gastric emptying  study  ______________________________________________________________________    SUBJECTIVE:     NAEON. Pain persistent. No BMs documented.    Scheduled Meds:  Current Facility-Administered Medications   Medication Dose Route Frequency Provider Last Rate    acetaminophen  650 mg Oral Q8H [facility] Eagle Babin MD      ampicillin-sulbactam  3 g Intravenous Q6H Eagle Babin MD 3 g (02/20/24 4957)    bisacodyl  10 mg Oral Daily Shady Montano MD      butalbital-acetaminophen-caffeine  1 tablet Oral Q4H PRN Miles Holm, DO      diphenhydrAMINE  50 mg Intravenous Q4H PRN Miles Holm, DO      famotidine  40 mg Oral BID Eagle Babin MD      folic acid  1 mg Oral Daily Miles Holm, DO      HYDROmorphone  0.5 mg Intravenous Q4H PRN Elizabeth Bose PA-C      HYDROmorphone  1 mg Intravenous Q4H PRN Eagle Babin MD      hydrOXYzine HCL  15 mg Oral Daily Miles Holm, DO      levothyroxine  75 mcg Oral Every Other Day Miles Holm, DO      liothyronine  5 mcg Oral Daily Miles Holm, DO      loratadine  10 mg Oral Daily Miles Holm, DO      methylPREDNISolone sodium succinate  40 mg Intravenous Daily Billy Loredo MD      mirtazapine  15 mg Oral HS Miles Holm, DO      montelukast  10 mg Oral HS Miles Holm, DO      ondansetron  4 mg Intravenous Q6H PRN Miles Holm, DO      oxybutynin  5 mg Oral BID PRN Miles Homl, DO      scopolamine  1 patch Transdermal Q72H Ruben G Chirayath, DO       Continuous Infusions:   PRN Meds:.  butalbital-acetaminophen-caffeine    diphenhydrAMINE    HYDROmorphone    HYDROmorphone    ondansetron    oxybutynin    OBJECTIVE:     Objective   Blood pressure 111/73, pulse (!) 52, temperature (!) 97.3 °F (36.3 °C), temperature source Temporal, resp. rate 20, weight 75.2 kg (165 lb 12.6 oz), SpO2 96%, not currently breastfeeding. Body mass index is 27.39 kg/m².  No intake or output data in the 24 hours ending 02/20/24 1102    PHYSICAL EXAM:   General  Appearance: Awake and alert, in no acute distress  Abdomen: Soft, TTP diffusely but R > L, non-distended; no masses or no organomegaly    Invasive Devices       Peripheral Intravenous Line  Duration             Peripheral IV 02/19/24 Right Hand 1 day              Line  Duration             Pump Device  -- days                    LAB RESULTS:      Lab Units 02/20/24  0507 02/19/24  0823 02/18/24  0521 02/17/24  0535 02/16/24  0609 02/14/24  0452 02/13/24  0552 02/09/24  0515 02/08/24  0552   SODIUM mmol/L 140 139 138 137 139   < > 141   < > 139   POTASSIUM mmol/L 3.4* 3.2* 3.6 3.7 3.2*   < > 3.1*   < > 4.1   CHLORIDE mmol/L 105 106 105 107 109*   < > 108   < > 106   CO2 mmol/L 26 27 26 22 25   < > 26   < > 28   BUN mg/dL 9 8 6 2* 6   < > 6   < > 8   CREATININE mg/dL 0.52* 0.61 0.67 0.58* 0.59*   < > 0.63   < > 0.69   GLUCOSE RANDOM mg/dL 88 105 173* 76 76   < > 102   < > 74   CALCIUM mg/dL 8.1* 8.2* 8.3* 7.5* 7.8*   < > 7.7*   < > 7.9*   MAGNESIUM mg/dL  --  1.9 1.9 1.9 1.9  --  1.9  --  2.3   PHOSPHORUS mg/dL  --  2.6* 2.7 2.8 2.0*  --   --   --  2.6*    < > = values in this interval not displayed.            Lab Units 02/19/24  0823 02/18/24  0521 02/17/24  0535 02/16/24  0609 02/15/24  0935 09/02/23  0717 08/28/23  0921   TOTAL PROTEIN g/dL 5.4* 5.5* 5.4* 5.1* 5.6*   < > 7.1   ALBUMIN g/dL 3.1* 3.1* 3.0* 3.0* 3.4*   < > 4.1   TOTAL BILIRUBIN mg/dL 0.62 1.12* 2.02* 2.43* 2.49*   < > 0.31   BILIRUBIN DIRECT mg/dL  --   --   --   --  1.86*  --  0.13   AST U/L 25 36 87* 239* 677*   < > 22   ALT U/L 127* 179* 253* 399* 577*   < > 26   ALK PHOS U/L 150* 175* 184* 180* 213*   < > 56    < > = values in this interval not displayed.           Lab Units 02/20/24  0507 02/19/24  0527 02/18/24  0521 02/17/24  0535 02/16/24  0609   WBC Thousand/uL 11.35* 12.34* 11.71* 7.47 13.00*   HEMOGLOBIN g/dL 12.8 13.1 12.9 13.2 12.7   HEMATOCRIT % 38.6 39.5 37.8 39.8 37.1   PLATELETS Thousands/uL 377 255 355 339 330   MCV fL 99* 101* 96  99* 97       Lab Results   Component Value Date    IRON 76 01/29/2024    TIBC 310 01/29/2024    FERRITIN 440 (H) 01/29/2024       Lab Results   Component Value Date    INR 1.00 02/07/2024    INR 0.91 09/02/2023    INR 0.86 08/28/2023    PROTIME 13.1 02/07/2024    PROTIME 12.2 09/02/2023    PROTIME 11.9 08/28/2023       RADIOLOGY RESULTS:   No results found.  Narrative/Impressions - 3 day look back     Shady Montano M.D.  PGY-5 Gastroenterology Fellow  [facility]  Available on Tiger[employer]  [email or web address]

## 2024-02-20 NOTE — ASSESSMENT & PLAN NOTE
She has klebsiella oxytoca bacteremia from biliary tract source  She has multiple drug allergies  ID recommendations reviewed  She will finish her last dose of antibiotic tomorrow (7 days total)

## 2024-02-21 ENCOUNTER — APPOINTMENT (INPATIENT)
Dept: MRI IMAGING | Facility: HOSPITAL | Age: 49
DRG: 444 | End: 2024-02-21
Payer: COMMERCIAL

## 2024-02-21 PROBLEM — K52.1 DIARRHEA DUE TO DRUG: Status: RESOLVED | Noted: 2024-01-12 | Resolved: 2024-02-21

## 2024-02-21 LAB
BACTERIA BLD CULT: NORMAL
BACTERIA BLD CULT: NORMAL

## 2024-02-21 PROCEDURE — 99232 SBSQ HOSP IP/OBS MODERATE 35: CPT | Performed by: INTERNAL MEDICINE

## 2024-02-21 PROCEDURE — 83993 ASSAY FOR CALPROTECTIN FECAL: CPT | Performed by: INTERNAL MEDICINE

## 2024-02-21 PROCEDURE — 74181 MRI ABDOMEN W/O CONTRAST: CPT

## 2024-02-21 RX ORDER — PREDNISONE 20 MG/1
40 TABLET ORAL DAILY
Status: DISCONTINUED | OUTPATIENT
Start: 2024-02-22 | End: 2024-02-25

## 2024-02-21 RX ADMIN — DICYCLOMINE HYDROCHLORIDE 20 MG: 20 TABLET ORAL at 11:58

## 2024-02-21 RX ADMIN — SODIUM CHLORIDE 3 G: 9 INJECTION, SOLUTION INTRAVENOUS at 15:37

## 2024-02-21 RX ADMIN — METHYLPREDNISOLONE SODIUM SUCCINATE 40 MG: 40 INJECTION, POWDER, FOR SOLUTION INTRAMUSCULAR; INTRAVENOUS at 08:31

## 2024-02-21 RX ADMIN — MONTELUKAST 10 MG: 10 TABLET, FILM COATED ORAL at 21:10

## 2024-02-21 RX ADMIN — DICYCLOMINE HYDROCHLORIDE 20 MG: 20 TABLET ORAL at 21:10

## 2024-02-21 RX ADMIN — HYDROMORPHONE HYDROCHLORIDE 1 MG: 0.5 INJECTION, SOLUTION INTRAMUSCULAR; INTRAVENOUS; SUBCUTANEOUS at 16:10

## 2024-02-21 RX ADMIN — LORATADINE 10 MG: 10 TABLET ORAL at 08:26

## 2024-02-21 RX ADMIN — HYDROMORPHONE HYDROCHLORIDE 1 MG: 0.5 INJECTION, SOLUTION INTRAMUSCULAR; INTRAVENOUS; SUBCUTANEOUS at 20:26

## 2024-02-21 RX ADMIN — MIRTAZAPINE 15 MG: 15 TABLET, FILM COATED ORAL at 21:10

## 2024-02-21 RX ADMIN — SODIUM CHLORIDE 3 G: 9 INJECTION, SOLUTION INTRAVENOUS at 09:35

## 2024-02-21 RX ADMIN — DIPHENHYDRAMINE HYDROCHLORIDE 50 MG: 50 INJECTION, SOLUTION INTRAMUSCULAR; INTRAVENOUS at 07:44

## 2024-02-21 RX ADMIN — LIOTHYRONINE SODIUM 5 MCG: 5 TABLET ORAL at 06:07

## 2024-02-21 RX ADMIN — ACETAMINOPHEN 325MG 650 MG: 325 TABLET ORAL at 21:10

## 2024-02-21 RX ADMIN — ACETAMINOPHEN 325MG 650 MG: 325 TABLET ORAL at 15:29

## 2024-02-21 RX ADMIN — SCOPALAMINE 1 PATCH: 1 PATCH, EXTENDED RELEASE TRANSDERMAL at 08:28

## 2024-02-21 RX ADMIN — DIPHENHYDRAMINE HYDROCHLORIDE 50 MG: 50 INJECTION, SOLUTION INTRAMUSCULAR; INTRAVENOUS at 03:08

## 2024-02-21 RX ADMIN — ACETAMINOPHEN 325MG 650 MG: 325 TABLET ORAL at 06:07

## 2024-02-21 RX ADMIN — HYDROMORPHONE HYDROCHLORIDE 0.5 MG: 1 INJECTION, SOLUTION INTRAMUSCULAR; INTRAVENOUS; SUBCUTANEOUS at 14:12

## 2024-02-21 RX ADMIN — FOLIC ACID 1 MG: 1 TABLET ORAL at 08:25

## 2024-02-21 RX ADMIN — HYDROMORPHONE HYDROCHLORIDE 0.5 MG: 1 INJECTION, SOLUTION INTRAMUSCULAR; INTRAVENOUS; SUBCUTANEOUS at 08:31

## 2024-02-21 RX ADMIN — SODIUM CHLORIDE 3 G: 9 INJECTION, SOLUTION INTRAVENOUS at 03:05

## 2024-02-21 RX ADMIN — BISACODYL 10 MG: 5 TABLET, COATED ORAL at 08:24

## 2024-02-21 RX ADMIN — HYDROMORPHONE HYDROCHLORIDE 0.5 MG: 1 INJECTION, SOLUTION INTRAMUSCULAR; INTRAVENOUS; SUBCUTANEOUS at 18:25

## 2024-02-21 RX ADMIN — DIPHENHYDRAMINE HYDROCHLORIDE 50 MG: 50 INJECTION, SOLUTION INTRAMUSCULAR; INTRAVENOUS at 20:27

## 2024-02-21 RX ADMIN — HYDROMORPHONE HYDROCHLORIDE 0.5 MG: 1 INJECTION, SOLUTION INTRAMUSCULAR; INTRAVENOUS; SUBCUTANEOUS at 04:19

## 2024-02-21 RX ADMIN — FAMOTIDINE 40 MG: 20 TABLET ORAL at 17:08

## 2024-02-21 RX ADMIN — SODIUM CHLORIDE 3 G: 9 INJECTION, SOLUTION INTRAVENOUS at 21:11

## 2024-02-21 RX ADMIN — DICYCLOMINE HYDROCHLORIDE 20 MG: 20 TABLET ORAL at 17:08

## 2024-02-21 RX ADMIN — HYDROMORPHONE HYDROCHLORIDE 1 MG: 0.5 INJECTION, SOLUTION INTRAMUSCULAR; INTRAVENOUS; SUBCUTANEOUS at 11:31

## 2024-02-21 RX ADMIN — HYDROXYZINE HYDROCHLORIDE 15 MG: 10 TABLET ORAL at 08:25

## 2024-02-21 RX ADMIN — DICYCLOMINE HYDROCHLORIDE 20 MG: 20 TABLET ORAL at 06:07

## 2024-02-21 RX ADMIN — DIPHENHYDRAMINE HYDROCHLORIDE 50 MG: 50 INJECTION, SOLUTION INTRAMUSCULAR; INTRAVENOUS at 16:10

## 2024-02-21 RX ADMIN — ENOXAPARIN SODIUM 40 MG: 40 INJECTION SUBCUTANEOUS at 08:24

## 2024-02-21 RX ADMIN — DIPHENHYDRAMINE HYDROCHLORIDE 50 MG: 50 INJECTION, SOLUTION INTRAMUSCULAR; INTRAVENOUS at 11:58

## 2024-02-21 RX ADMIN — FAMOTIDINE 40 MG: 20 TABLET ORAL at 08:24

## 2024-02-21 NOTE — ASSESSMENT & PLAN NOTE
She has klebsiella oxytoca bacteremia from biliary tract source  She has multiple drug allergies  ID recommendations reviewed  She will finish her last dose of antibiotic today (7 days total)

## 2024-02-21 NOTE — ASSESSMENT & PLAN NOTE
History of Crohn's disease on Skyrizi as well as methotrexate  Solumedrol switched to PO prednisone per GI

## 2024-02-21 NOTE — ASSESSMENT & PLAN NOTE
Continue repletion  Monitor daily  Recent Labs     02/19/24  0823 02/20/24  0507   K 3.2* 3.4*

## 2024-02-21 NOTE — ASSESSMENT & PLAN NOTE
Corn allergy noted   Multiple drug allergies noted but tolerated zosyn and unasyn  She tolerated lovenox and bentyl which were added yesterday

## 2024-02-21 NOTE — PLAN OF CARE
Problem: PAIN - ADULT  Goal: Verbalizes/displays adequate comfort level or baseline comfort level  Description: Interventions:  - Encourage patient to monitor pain and request assistance  - Assess pain using appropriate pain scale  - Administer analgesics based on type and severity of pain and evaluate response  - Implement non-pharmacological measures as appropriate and evaluate response  - Consider cultural and social influences on pain and pain management  - Notify physician/advanced practitioner if interventions unsuccessful or patient reports new pain  Outcome: Progressing     Problem: Knowledge Deficit  Goal: Patient/family/caregiver demonstrates understanding of disease process, treatment plan, medications, and discharge instructions  Description: Complete learning assessment and assess knowledge base.  Interventions:  - Provide teaching at level of understanding  - Provide teaching via preferred learning methods  Outcome: Progressing     Problem: GASTROINTESTINAL - ADULT  Goal: Minimal or absence of nausea and/or vomiting  Description: INTERVENTIONS:  - Administer IV fluids if ordered to ensure adequate hydration  - Maintain NPO status until nausea and vomiting are resolved  - Nasogastric tube if ordered  - Administer ordered antiemetic medications as needed  - Provide nonpharmacologic comfort measures as appropriate  - Advance diet as tolerated, if ordered  - Consider nutrition services referral to assist patient with adequate nutrition and appropriate food choices  Outcome: Progressing  Goal: Maintains or returns to baseline bowel function  Description: INTERVENTIONS:  - Assess bowel function  - Encourage oral fluids to ensure adequate hydration  - Administer IV fluids if ordered to ensure adequate hydration  - Administer ordered medications as needed  - Encourage mobilization and activity  - Consider nutritional services referral to assist patient with adequate nutrition and appropriate food  choices  Outcome: Progressing  Goal: Maintains adequate nutritional intake  Description: INTERVENTIONS:  - Monitor percentage of each meal consumed  - Identify factors contributing to decreased intake, treat as appropriate  - Assist with meals as needed  - Monitor I&O, weight, and lab values if indicated  - Obtain nutrition services referral as needed  Outcome: Progressing     Problem: DISCHARGE PLANNING  Goal: Discharge to home or other facility with appropriate resources  Description: INTERVENTIONS:  - Identify barriers to discharge w/patient and caregiver  - Arrange for needed discharge resources and transportation as appropriate  - Identify discharge learning needs (meds, wound care, etc.)  - Arrange for interpretive services to assist at discharge as needed  - Refer to Case Management Department for coordinating discharge planning if the patient needs post-hospital services based on physician/advanced practitioner order or complex needs related to functional status, cognitive ability, or social support system  Outcome: Progressing     Problem: METABOLIC, FLUID AND ELECTROLYTES - ADULT  Goal: Fluid balance maintained  Description: INTERVENTIONS:  - Monitor labs   - Monitor I/O and WT  - Instruct patient on fluid and nutrition as appropriate  - Assess for signs & symptoms of volume excess or deficit  Outcome: Progressing     Problem: Nutrition/Hydration-ADULT  Goal: Nutrient/Hydration intake appropriate for improving, restoring or maintaining nutritional needs  Description: Monitor and assess patient's nutrition/hydration status for malnutrition. Collaborate with interdisciplinary team and initiate plan and interventions as ordered.  Monitor patient's weight and dietary intake as ordered or per policy. Utilize nutrition screening tool and intervene as necessary. Determine patient's food preferences and provide high-protein, high-caloric foods as appropriate.     INTERVENTIONS:  - Monitor oral intake, urinary  output, labs, and treatment plans  - Assess nutrition and hydration status and recommend course of action  - Evaluate amount of meals eaten  - Assist patient with eating if necessary   - Allow adequate time for meals  - Recommend/ encourage appropriate diets, oral nutritional supplements, and vitamin/mineral supplements  - Order, calculate, and assess calorie counts as needed  - Recommend, monitor, and adjust tube feedings and TPN/PPN based on assessed needs  - Assess need for intravenous fluids  - Provide specific nutrition/hydration education as appropriate  - Include patient/family/caregiver in decisions related to nutrition  Outcome: Progressing

## 2024-02-21 NOTE — PROGRESS NOTES
[facility]  Progress Note  Name: Celine Roa I  MRN: [id number]  Unit/Bed#: [place] [address] -01 I Date of Admission: 2024   Date of Service: 2024 I Hospital Day: 8    Assessment/Plan   * Abdominal pain  Assessment & Plan  Improving RUQ pain  S/p ERCP and sphincterotomy  MRCP with stable findings  She felt better with the addition of bentyl  Cautious use of narcotics    Sepsis without acute organ dysfunction (HCC)  Assessment & Plan  She has klebsiella oxytoca bacteremia from biliary tract source  She has multiple drug allergies  ID recommendations reviewed  She will finish her last dose of antibiotic today (7 days total)    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HCC)  Assessment & Plan  Corn allergy noted   Multiple drug allergies noted but tolerated zosyn and unasyn  She tolerated lovenox and bentyl which were added yesterday    GERD without esophagitis  Assessment & Plan  Continue Pepcid    Crohn's colitis (HCC)  Assessment & Plan  History of Crohn's disease on Skyrizi as well as methotrexate  Solumedrol switched to PO prednisone per GI    Acquired hypothyroidism  Assessment & Plan  Continue Cytomel         VTE Pharmacologic Prophylaxis: VTE Score: 3 Moderate Risk (Score 3-4) - Pharmacological DVT Prophylaxis Ordered: enoxaparin (Lovenox).    Patient Centered Rounds: discussed with her nurse   Discussions with Specialists or Other Care Team Provider: Leila Montano    Current Length of Stay: 8 day(s)  Current Patient Status: Inpatient   Certification Statement: The patient will continue to require additional inpatient hospital stay due to pain  Discharge Plan: Anticipate discharge in 24-48 hrs to home.    Code Status: Level 1 - Full Code    Subjective:   She felt better since bentyl was started  Less pain after eating  No fever/chills    Objective:     Vitals:   Temp (24hrs), Av.4 °F (36.9 °C), Min:98.2 °F (36.8 °C), Max:98.6 °F (37 °C)    Temp:  [98.2 °F (36.8 °C)-98.6 °F (37 °C)]  98.6 °F (37 °C)  HR:  [79] 79  Resp:  [16-20] 16  BP: (115-120)/(77-83) 119/77  SpO2:  [95 %-96 %] 95 %  Body mass index is 27.32 kg/m².     Input and Output Summary (last 24 hours):   No intake or output data in the 24 hours ending 02/21/24 1721    Physical Exam:   Physical Exam  Vitals reviewed.   HENT:      Head: Normocephalic and atraumatic.      Nose: No congestion or rhinorrhea.   Eyes:      General: No scleral icterus.  Cardiovascular:      Rate and Rhythm: Normal rate and regular rhythm.   Pulmonary:      Breath sounds: Normal breath sounds.   Abdominal:      General: Bowel sounds are normal.      Tenderness: There is abdominal tenderness.   Musculoskeletal:      Right lower leg: No edema.      Left lower leg: No edema.   Skin:     General: Skin is warm and dry.   Neurological:      Motor: No weakness.   Psychiatric:         Behavior: Behavior normal.          Additional Data:     Labs:  Results from last 7 days   Lab Units 02/20/24  0507   WBC Thousand/uL 11.35*   HEMOGLOBIN g/dL 12.8   HEMATOCRIT % 38.6   PLATELETS Thousands/uL 377   NEUTROS PCT % 63   LYMPHS PCT % 23   MONOS PCT % 10   EOS PCT % 2     Results from last 7 days   Lab Units 02/20/24  0507   SODIUM mmol/L 140   POTASSIUM mmol/L 3.4*   CHLORIDE mmol/L 105   CO2 mmol/L 26   BUN mg/dL 9   CREATININE mg/dL 0.52*   ANION GAP mmol/L 9   CALCIUM mg/dL 8.1*   ALBUMIN g/dL 3.2*   TOTAL BILIRUBIN mg/dL 0.64   ALK PHOS U/L 148*   ALT U/L 112*   AST U/L 35   GLUCOSE RANDOM mg/dL 88                 Results from last 7 days   Lab Units 02/15/24  0935   LACTIC ACID mmol/L 2.0       Lines/Drains:  Invasive Devices       Peripheral Intravenous Line  Duration             Peripheral IV 02/19/24 Right Hand 2 days    Peripheral IV 02/20/24 Right Wrist 1 day              Line  Duration             Pump Device  -- days                          Imaging: Reviewed radiology reports from this admission including: MRI abdomen/MRCP    Recent Cultures (last 7 days):    Results from last 7 days   Lab Units 02/16/24  1117 02/15/24  0932   BLOOD CULTURE  No Growth After 5 Days.  No Growth After 5 Days. No Growth After 5 Days.  Klebsiella oxytoca*   GRAM STAIN RESULT   --  Gram negative rods*       Last 24 Hours Medication List:   Current Facility-Administered Medications   Medication Dose Route Frequency Provider Last Rate    acetaminophen  650 mg Oral Q8H [facility] Eagle Babin MD      ampicillin-sulbactam  3 g Intravenous Q6H Eagle Babin MD 3 g (02/21/24 1537)    bisacodyl  10 mg Oral Daily Shady Montano MD      butalbital-acetaminophen-caffeine  1 tablet Oral Q4H PRN Miles Holm, DO      dicyclomine  20 mg Oral 4x Daily (AC & HS) Vipul Partida MD      diphenhydrAMINE  50 mg Intravenous Q4H PRN Miles Holm, DO      enoxaparin  40 mg Subcutaneous Q24H [facility] Vipul Partida MD      famotidine  40 mg Oral BID Eagle Babin MD      folic acid  1 mg Oral Daily Miles Holm, DO      HYDROmorphone  0.5 mg Intravenous Q4H PRN Elizabeth Bose PA-C      HYDROmorphone  1 mg Intravenous Q4H PRN Eagle Babin MD      hydrOXYzine HCL  15 mg Oral Daily Miles Holm, DO      levothyroxine  75 mcg Oral Every Other Day Miles Holm, DO      liothyronine  5 mcg Oral Daily Miles Holm, DO      loratadine  10 mg Oral Daily Miles Holm, DO      mirtazapine  15 mg Oral HS Miles Holm, DO      montelukast  10 mg Oral HS Miles Holm, DO      ondansetron  4 mg Intravenous Q6H PRN Miles Holm, DO      oxybutynin  5 mg Oral BID PRN Miles Holm DO      [START ON 2/22/2024] predniSONE  40 mg Oral Daily Shady Montano MD      scopolamine  1 patch Transdermal Q72H Ruben Guadarrama DO          Today, Patient Was Seen By: Vipul Partida MD    **Please Note: This note may have been constructed using a voice recognition system.**

## 2024-02-21 NOTE — ASSESSMENT & PLAN NOTE
Improving RUQ pain  S/p ERCP and sphincterotomy  MRCP with stable findings  She felt better with the addition of bentyl  Cautious use of narcotics

## 2024-02-22 ENCOUNTER — HOSPITAL ENCOUNTER (OUTPATIENT)
Dept: INFUSION CENTER | Facility: HOSPITAL | Age: 49
Discharge: HOME | End: 2024-02-22
Attending: INTERNAL MEDICINE

## 2024-02-22 PROBLEM — T78.40XA ALLERGIC REACTION: Status: ACTIVE | Noted: 2023-03-29

## 2024-02-22 PROBLEM — F41.9 ANXIETY: Status: ACTIVE | Noted: 2024-02-22

## 2024-02-22 PROBLEM — T78.1XXA ORAL ALLERGY SYNDROME: Status: ACTIVE | Noted: 2024-02-22

## 2024-02-22 PROBLEM — T78.1XXA POLLEN-FOOD ALLERGY: Status: ACTIVE | Noted: 2024-02-22

## 2024-02-22 PROBLEM — D84.9 IMMUNOSUPPRESSION (HCC): Status: RESOLVED | Noted: 2019-08-27 | Resolved: 2024-02-22

## 2024-02-22 PROBLEM — Q99.9 GENETIC DISORDER: Status: ACTIVE | Noted: 2024-02-22

## 2024-02-22 PROBLEM — G43.919 INTRACTABLE MIGRAINE WITHOUT STATUS MIGRAINOSUS: Status: ACTIVE | Noted: 2022-04-09

## 2024-02-22 PROBLEM — E87.6 HYPOKALEMIA: Status: RESOLVED | Noted: 2017-08-25 | Resolved: 2024-02-22

## 2024-02-22 PROBLEM — D84.9 IMMUNOCOMPROMISED (HCC): Status: ACTIVE | Noted: 2022-04-09

## 2024-02-22 PROBLEM — R63.8 WEIGHT DISORDER: Status: ACTIVE | Noted: 2024-02-22

## 2024-02-22 PROBLEM — K75.9 HEPATITIS: Status: ACTIVE | Noted: 2024-02-22

## 2024-02-22 PROBLEM — G62.9 SMALL FIBER NEUROPATHY: Status: ACTIVE | Noted: 2022-04-05

## 2024-02-22 PROBLEM — U07.1 COVID-19: Status: RESOLVED | Noted: 2021-12-12 | Resolved: 2024-02-22

## 2024-02-22 PROBLEM — R65.10 SIRS (SYSTEMIC INFLAMMATORY RESPONSE SYNDROME) (HCC): Status: RESOLVED | Noted: 2017-08-25 | Resolved: 2024-02-22

## 2024-02-22 PROBLEM — N80.9 ENDOMETRIOSIS: Status: ACTIVE | Noted: 2024-02-22

## 2024-02-22 PROBLEM — Z87.898 HISTORY OF PERIPHERAL EDEMA: Status: ACTIVE | Noted: 2024-02-22

## 2024-02-22 PROBLEM — K50.90 CROHN DISEASE (HCC): Status: ACTIVE | Noted: 2022-04-09

## 2024-02-22 PROBLEM — T78.2XXA IDIOPATHIC ANAPHYLAXIS: Status: ACTIVE | Noted: 2023-01-04

## 2024-02-22 PROBLEM — K44.9 HIATAL HERNIA: Status: ACTIVE | Noted: 2022-04-09

## 2024-02-22 PROBLEM — M79.651 PAIN OF RIGHT THIGH: Status: RESOLVED | Noted: 2023-02-13 | Resolved: 2024-02-22

## 2024-02-22 PROBLEM — Z86.19 H/O LYME DISEASE: Status: ACTIVE | Noted: 2024-02-22

## 2024-02-22 PROBLEM — K85.90 PANCREATITIS: Status: ACTIVE | Noted: 2024-02-22

## 2024-02-22 PROBLEM — S82.843A CLOSED BIMALLEOLAR FRACTURE: Status: RESOLVED | Noted: 2024-02-22 | Resolved: 2024-02-22

## 2024-02-22 PROBLEM — D75.839 THROMBOCYTOSIS: Status: RESOLVED | Noted: 2022-04-05 | Resolved: 2024-02-22

## 2024-02-22 PROBLEM — D89.44: Status: ACTIVE | Noted: 2022-04-09

## 2024-02-22 PROBLEM — E43 SEVERE MALNUTRITION (HCC): Status: RESOLVED | Noted: 2024-02-22 | Resolved: 2024-02-22

## 2024-02-22 PROBLEM — S06.9XAA TRAUMATIC BRAIN INJURY (HCC): Status: ACTIVE | Noted: 2022-04-09

## 2024-02-22 PROBLEM — N83.209 CYST OF OVARY: Status: RESOLVED | Noted: 2024-02-22 | Resolved: 2024-02-22

## 2024-02-22 PROBLEM — M25.373 ANKLE INSTABILITY: Status: RESOLVED | Noted: 2023-08-23 | Resolved: 2024-02-22

## 2024-02-22 PROBLEM — K51.90 ULCERATIVE COLITIS (HCC): Status: ACTIVE | Noted: 2022-04-05

## 2024-02-22 PROBLEM — R13.11 ORAL PHASE DYSPHAGIA: Status: ACTIVE | Noted: 2024-02-22

## 2024-02-22 PROBLEM — E06.3 HASHIMOTO'S DISEASE: Status: ACTIVE | Noted: 2020-10-05

## 2024-02-22 PROBLEM — R76.8 HEPATITIS B ANTIBODY POSITIVE: Status: ACTIVE | Noted: 2024-02-22

## 2024-02-22 PROBLEM — D84.9 IMMUNOSUPPRESSION (HCC): Status: ACTIVE | Noted: 2022-04-05

## 2024-02-22 PROBLEM — G35 MULTIPLE SCLEROSIS (HCC): Status: ACTIVE | Noted: 2020-12-03

## 2024-02-22 PROBLEM — A41.9 SEPSIS WITHOUT ACUTE ORGAN DYSFUNCTION (HCC): Status: RESOLVED | Noted: 2024-02-15 | Resolved: 2024-02-22

## 2024-02-22 PROBLEM — M85.80 OSTEOPENIA: Status: ACTIVE | Noted: 2022-04-09

## 2024-02-22 PROBLEM — Z88.9 MULTIPLE ALLERGIES: Status: ACTIVE | Noted: 2022-04-09

## 2024-02-22 LAB
ANION GAP SERPL CALCULATED.3IONS-SCNC: 6 MMOL/L
ATRIAL RATE: 72 BPM
BASOPHILS # BLD AUTO: 0.11 THOUSANDS/ÂΜL (ref 0–0.1)
BASOPHILS NFR BLD AUTO: 1 % (ref 0–1)
BUN SERPL-MCNC: 15 MG/DL (ref 5–25)
CALCIUM SERPL-MCNC: 8.6 MG/DL (ref 8.4–10.2)
CARDIAC TROPONIN I PNL SERPL HS: 2 NG/L (ref 8–18)
CHLORIDE SERPL-SCNC: 103 MMOL/L (ref 96–108)
CO2 SERPL-SCNC: 29 MMOL/L (ref 21–32)
CREAT SERPL-MCNC: 0.7 MG/DL (ref 0.6–1.3)
EOSINOPHIL # BLD AUTO: 0.16 THOUSAND/ÂΜL (ref 0–0.61)
EOSINOPHIL NFR BLD AUTO: 1 % (ref 0–6)
ERYTHROCYTE [DISTWIDTH] IN BLOOD BY AUTOMATED COUNT: 15.9 % (ref 11.6–15.1)
GFR SERPL CREATININE-BSD FRML MDRD: 100 ML/MIN/1.73SQ M
GLUCOSE SERPL-MCNC: 99 MG/DL (ref 65–140)
HCT VFR BLD AUTO: 39 % (ref 34.8–46.1)
HGB BLD-MCNC: 13.1 G/DL (ref 11.5–15.4)
IMM GRANULOCYTES # BLD AUTO: 0.25 THOUSAND/UL (ref 0–0.2)
IMM GRANULOCYTES NFR BLD AUTO: 2 % (ref 0–2)
LYMPHOCYTES # BLD AUTO: 3.39 THOUSANDS/ÂΜL (ref 0.6–4.47)
LYMPHOCYTES NFR BLD AUTO: 22 % (ref 14–44)
MCH RBC QN AUTO: 33.1 PG (ref 26.8–34.3)
MCHC RBC AUTO-ENTMCNC: 33.6 G/DL (ref 31.4–37.4)
MCV RBC AUTO: 99 FL (ref 82–98)
MONOCYTES # BLD AUTO: 1.64 THOUSAND/ÂΜL (ref 0.17–1.22)
MONOCYTES NFR BLD AUTO: 11 % (ref 4–12)
NEUTROPHILS # BLD AUTO: 10 THOUSANDS/ÂΜL (ref 1.85–7.62)
NEUTS SEG NFR BLD AUTO: 63 % (ref 43–75)
NRBC BLD AUTO-RTO: 0 /100 WBCS
P AXIS: 46 DEGREES
PLATELET # BLD AUTO: 407 THOUSANDS/UL (ref 149–390)
PMV BLD AUTO: 9.1 FL (ref 8.9–12.7)
POTASSIUM SERPL-SCNC: 3.5 MMOL/L (ref 3.5–5.3)
PR INTERVAL: 126 MS
QRS AXIS: 52 DEGREES
QRSD INTERVAL: 82 MS
QT INTERVAL: 412 MS
QTC INTERVAL: 451 MS
RBC # BLD AUTO: 3.96 MILLION/UL (ref 3.81–5.12)
SODIUM SERPL-SCNC: 138 MMOL/L (ref 135–147)
T WAVE AXIS: 70 DEGREES
VENTRICULAR RATE: 72 BPM
WBC # BLD AUTO: 15.55 THOUSAND/UL (ref 4.31–10.16)

## 2024-02-22 PROCEDURE — 80048 BASIC METABOLIC PNL TOTAL CA: CPT | Performed by: INTERNAL MEDICINE

## 2024-02-22 PROCEDURE — 84484 ASSAY OF TROPONIN QUANT: CPT | Performed by: INTERNAL MEDICINE

## 2024-02-22 PROCEDURE — 99232 SBSQ HOSP IP/OBS MODERATE 35: CPT | Performed by: INTERNAL MEDICINE

## 2024-02-22 PROCEDURE — 93010 ELECTROCARDIOGRAM REPORT: CPT | Performed by: INTERNAL MEDICINE

## 2024-02-22 PROCEDURE — 85025 COMPLETE CBC W/AUTO DIFF WBC: CPT | Performed by: INTERNAL MEDICINE

## 2024-02-22 PROCEDURE — 93005 ELECTROCARDIOGRAM TRACING: CPT

## 2024-02-22 PROCEDURE — 99232 SBSQ HOSP IP/OBS MODERATE 35: CPT | Performed by: STUDENT IN AN ORGANIZED HEALTH CARE EDUCATION/TRAINING PROGRAM

## 2024-02-22 RX ORDER — HYDROMORPHONE HCL/PF 1 MG/ML
0.5 SYRINGE (ML) INJECTION ONCE
Qty: 0.5 ML | Refills: 0 | Status: COMPLETED | OUTPATIENT
Start: 2024-02-22 | End: 2024-02-22

## 2024-02-22 RX ORDER — TRAMADOL HYDROCHLORIDE 50 MG/1
50 TABLET ORAL EVERY 6 HOURS PRN
Status: DISCONTINUED | OUTPATIENT
Start: 2024-02-22 | End: 2024-02-24

## 2024-02-22 RX ADMIN — ACETAMINOPHEN 325MG 650 MG: 325 TABLET ORAL at 21:22

## 2024-02-22 RX ADMIN — DICYCLOMINE HYDROCHLORIDE 20 MG: 20 TABLET ORAL at 12:23

## 2024-02-22 RX ADMIN — SODIUM CHLORIDE 3 G: 9 INJECTION, SOLUTION INTRAVENOUS at 03:04

## 2024-02-22 RX ADMIN — HYDROMORPHONE HYDROCHLORIDE 0.5 MG: 1 INJECTION, SOLUTION INTRAMUSCULAR; INTRAVENOUS; SUBCUTANEOUS at 09:10

## 2024-02-22 RX ADMIN — SODIUM CHLORIDE 3 G: 9 INJECTION, SOLUTION INTRAVENOUS at 09:13

## 2024-02-22 RX ADMIN — ACETAMINOPHEN 325MG 650 MG: 325 TABLET ORAL at 05:42

## 2024-02-22 RX ADMIN — HYDROMORPHONE HYDROCHLORIDE 0.5 MG: 0.5 INJECTION, SOLUTION INTRAMUSCULAR; INTRAVENOUS; SUBCUTANEOUS at 18:24

## 2024-02-22 RX ADMIN — DIPHENHYDRAMINE HYDROCHLORIDE 50 MG: 50 INJECTION, SOLUTION INTRAMUSCULAR; INTRAVENOUS at 14:54

## 2024-02-22 RX ADMIN — DICYCLOMINE HYDROCHLORIDE 20 MG: 20 TABLET ORAL at 17:08

## 2024-02-22 RX ADMIN — HYDROXYZINE HYDROCHLORIDE 15 MG: 10 TABLET ORAL at 09:03

## 2024-02-22 RX ADMIN — LIOTHYRONINE SODIUM 5 MCG: 5 TABLET ORAL at 05:43

## 2024-02-22 RX ADMIN — TRAMADOL HYDROCHLORIDE 50 MG: 50 TABLET, COATED ORAL at 21:22

## 2024-02-22 RX ADMIN — MONTELUKAST 10 MG: 10 TABLET, FILM COATED ORAL at 21:23

## 2024-02-22 RX ADMIN — ENOXAPARIN SODIUM 40 MG: 40 INJECTION SUBCUTANEOUS at 09:03

## 2024-02-22 RX ADMIN — MORPHINE SULFATE 2 MG: 2 INJECTION, SOLUTION INTRAMUSCULAR; INTRAVENOUS at 16:09

## 2024-02-22 RX ADMIN — DIPHENHYDRAMINE HYDROCHLORIDE 50 MG: 50 INJECTION, SOLUTION INTRAMUSCULAR; INTRAVENOUS at 05:42

## 2024-02-22 RX ADMIN — ACETAMINOPHEN 325MG 650 MG: 325 TABLET ORAL at 14:54

## 2024-02-22 RX ADMIN — PREDNISONE 40 MG: 20 TABLET ORAL at 09:04

## 2024-02-22 RX ADMIN — HYDROMORPHONE HYDROCHLORIDE 0.5 MG: 1 INJECTION, SOLUTION INTRAMUSCULAR; INTRAVENOUS; SUBCUTANEOUS at 00:36

## 2024-02-22 RX ADMIN — BISACODYL 10 MG: 5 TABLET, COATED ORAL at 09:03

## 2024-02-22 RX ADMIN — HYDROMORPHONE HYDROCHLORIDE 0.5 MG: 1 INJECTION, SOLUTION INTRAMUSCULAR; INTRAVENOUS; SUBCUTANEOUS at 13:16

## 2024-02-22 RX ADMIN — FAMOTIDINE 40 MG: 20 TABLET ORAL at 09:04

## 2024-02-22 RX ADMIN — LORATADINE 10 MG: 10 TABLET ORAL at 09:03

## 2024-02-22 RX ADMIN — HYDROMORPHONE HYDROCHLORIDE 1 MG: 0.5 INJECTION, SOLUTION INTRAMUSCULAR; INTRAVENOUS; SUBCUTANEOUS at 10:07

## 2024-02-22 RX ADMIN — DICYCLOMINE HYDROCHLORIDE 20 MG: 20 TABLET ORAL at 06:34

## 2024-02-22 RX ADMIN — DIPHENHYDRAMINE HYDROCHLORIDE 50 MG: 50 INJECTION, SOLUTION INTRAMUSCULAR; INTRAVENOUS at 21:25

## 2024-02-22 RX ADMIN — DIPHENHYDRAMINE HYDROCHLORIDE 50 MG: 50 INJECTION, SOLUTION INTRAMUSCULAR; INTRAVENOUS at 00:40

## 2024-02-22 RX ADMIN — LEVOTHYROXINE SODIUM 75 MCG: 75 TABLET ORAL at 05:43

## 2024-02-22 RX ADMIN — MIRTAZAPINE 15 MG: 15 TABLET, FILM COATED ORAL at 21:22

## 2024-02-22 RX ADMIN — DICYCLOMINE HYDROCHLORIDE 20 MG: 20 TABLET ORAL at 21:22

## 2024-02-22 RX ADMIN — DIPHENHYDRAMINE HYDROCHLORIDE 50 MG: 50 INJECTION, SOLUTION INTRAMUSCULAR; INTRAVENOUS at 10:07

## 2024-02-22 RX ADMIN — HYDROMORPHONE HYDROCHLORIDE 1 MG: 0.5 INJECTION, SOLUTION INTRAMUSCULAR; INTRAVENOUS; SUBCUTANEOUS at 05:42

## 2024-02-22 RX ADMIN — FOLIC ACID 1 MG: 1 TABLET ORAL at 09:04

## 2024-02-22 RX ADMIN — FAMOTIDINE 40 MG: 20 TABLET ORAL at 17:08

## 2024-02-22 NOTE — ASSESSMENT & PLAN NOTE
History of Crohn's disease on Skyrizi as well as methotrexate  Solumedrol switched to PO prednisone 40 per GI  Slow taper per GI

## 2024-02-22 NOTE — NURSING NOTE
"Patient rang call bell for Pain medicine. RN arrived  to patients room with tramadol and found patient in the fetal position face down in bed. RN offered tramadol to patient.  Patient refused to sit up to take medicine.  Patients spouse stated \"she can not take a pill like this. Can't you give her something through the IV.\"  RN reached out to provider and a 1x dose of dilaudid was given.    "

## 2024-02-22 NOTE — CASE MANAGEMENT
Case Management Discharge Planning Note    Patient name Celine Roa  Location [facility] M* MRN [id number]  : 1975 Date 2024       Current Admission Date: 2024  Current Admission Diagnosis:Abdominal pain   Patient Active Problem List    Diagnosis Date Noted    Oral phase dysphagia 2024    Pancreatitis 2024    Pollen-food allergy 2024    Oral allergy syndrome 2024    History of peripheral edema 2024    Hepatitis B antibody positive 2024    H/o Lyme disease 2024    Genetic disorder 2024    Endometriosis 2024    Anxiety 2024    Weight disorder 2024    Hepatitis 2024    Bladder spasm 02/10/2024    Intractable right upper quadrant abdominal pain 2024    Common variable immunodeficiency (HCC) 2024    Iron deficiency 2023    Iron deficiency anemia due to chronic blood loss 2023    Depression due to physical illness 2023    Circadian rhythm sleep disorder 2023    Insomnia due to medical condition 2023    Recurrent major depressive disorder, in partial remission (HCC) 2023    Medical clearance for psychiatric admission 2023    Seasonal allergies 2023    GERD without esophagitis 2023    Major depressive disorder with psychotic features (HCC) 2023    Benzodiazepine misuse 2023    Passive suicidal ideations 2023    Encephalopathy chronic 2023    Systemic lupus erythematosus, unspecified SLE type, unspecified organ involvement status (HCC) 2023    Bladder tumor 2023    Seizure ([facility])     Allergic reaction 2023    Idiopathic anaphylaxis 2023    Immunosuppression due to chronic steroid use  2022    Intractable migraine without status migrainosus 2022    Multiple allergies 2022    Crohn disease (HCC) 2022    Traumatic brain injury (HCC) 2022    Osteopenia 2022    Immunocompromised  (HCC) 04/09/2022    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    Hiatal hernia 04/09/2022    Ulcerative colitis (HCC) 04/05/2022    Small fiber neuropathy 04/05/2022    Immunosuppression ([facility]) 04/05/2022    Atrial tachycardia 02/24/2022    Nephrolithiasis 12/06/2021    Anaphylactic reaction 12/04/2021    Intractable nausea and vomiting 12/02/2021    Heart palpitations 03/26/2021    Inflammatory bowel disease 03/07/2021    Current chronic use of systemic steroids 01/28/2021    MS (multiple sclerosis) ([facility]) 01/28/2021    Migraine     Overweight (BMI 25.0-29.9) 01/20/2021    Anorexia 10/17/2020    Crohn's colitis ([facility]) 10/17/2020    Hashimoto's disease 10/05/2020    Mild protein-calorie malnutrition ([facility]) 09/12/2019    Constipation 09/10/2019    Mass of left axilla 08/30/2019    Proctitis 08/26/2019    Essential (hemorrhagic) thrombocythemia ([facility])     Thrush 02/12/2019    Chronic back pain 02/12/2019    Primary localized osteoarthrosis of ankle and foot 06/04/2018    Fibromyalgia 12/28/2017    Meniere's disease 11/29/2017    Leukocytosis 09/10/2017    Hypomagnesemia 08/26/2017    Generalized anxiety disorder 08/26/2017    Vomiting and diarrhea 08/25/2017    Vitamin D deficiency 08/25/2017    Hypokalemia 08/25/2017    Throat tightness 08/25/2017    Abdominal pain 08/25/2017    Disorder of synovium 06/23/2017    Chondromalacia 05/12/2017    Chronic idiopathic urticaria 05/02/2017    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HCC) 05/19/2016    Acquired hypothyroidism 01/13/2014      LOS (days): 9  Geometric Mean LOS (GMLOS) (days): 4.4  Days to GMLOS:-4.5     OBJECTIVE:  Risk of Unplanned Readmission Score: 33.88         Current admission status: Inpatient   Preferred Pharmacy:   [facility] - [place] JACOBSON - [address]  [address]  [place] [address]  Phone: [phone] Fax: [phone]    [facility] - [place] [address]  [place] [address]  Phone: [phone] Fax: [phone]    [address] [address]  Phone: [phone] Fax: [phone]    Primary Care Provider: Olaf Rhodes MD    Primary Insurance: [facility] BL[facility]  Secondary Insurance: MEDICARE    DISCHARGE DETAILS:    Discharge planning discussed with:: Pt                                                                                   IMM Given (Date):: 02/22/24  IMM Given to:: Patient     Additional Comments: [facility] discussed IMM with pt. Pt demonstrated verbal understanding and is agreeable to dc. IMM signed and placed in scan bin.

## 2024-02-22 NOTE — ASSESSMENT & PLAN NOTE
"Persistent RUQ pain  S/p ERCP and sphincterotomy  MRCP with stable findings  ? Narcotic bowel due to dilaudid  Wean off narcotics.   Tried morphine x 1 which she tolerated. However, she showed a paper to her nurse showing she should not be on morphine but \"Ok\" to have fentanyl, dilaudid, tramadol  She was ok with tramadol  Switch back to clears  "

## 2024-02-22 NOTE — PLAN OF CARE
Problem: PAIN - ADULT  Goal: Verbalizes/displays adequate comfort level or baseline comfort level  Description: Interventions:  - Encourage patient to monitor pain and request assistance  - Assess pain using appropriate pain scale  - Administer analgesics based on type and severity of pain and evaluate response  - Implement non-pharmacological measures as appropriate and evaluate response  - Consider cultural and social influences on pain and pain management  - Notify physician/advanced practitioner if interventions unsuccessful or patient reports new pain  2/22/2024 1757 by Yoselin Murphy  Outcome: Progressing  2/22/2024 1757 by Yoselin Murphy  Outcome: Progressing     Problem: Knowledge Deficit  Goal: Patient/family/caregiver demonstrates understanding of disease process, treatment plan, medications, and discharge instructions  Description: Complete learning assessment and assess knowledge base.  Interventions:  - Provide teaching at level of understanding  - Provide teaching via preferred learning methods  2/22/2024 1757 by Yoselin Murphy  Outcome: Progressing  2/22/2024 1757 by Yoselin Murphy  Outcome: Progressing     Problem: GASTROINTESTINAL - ADULT  Goal: Minimal or absence of nausea and/or vomiting  Description: INTERVENTIONS:  - Administer IV fluids if ordered to ensure adequate hydration  - Maintain NPO status until nausea and vomiting are resolved  - Nasogastric tube if ordered  - Administer ordered antiemetic medications as needed  - Provide nonpharmacologic comfort measures as appropriate  - Advance diet as tolerated, if ordered  - Consider nutrition services referral to assist patient with adequate nutrition and appropriate food choices  2/22/2024 1757 by Yoselin Murphy  Outcome: Progressing  2/22/2024 1757 by Yoselin Murphy  Outcome: Progressing  Goal: Maintains or returns to baseline bowel function  Description: INTERVENTIONS:  - Assess bowel function  - Encourage oral fluids to ensure  adequate hydration  - Administer IV fluids if ordered to ensure adequate hydration  - Administer ordered medications as needed  - Encourage mobilization and activity  - Consider nutritional services referral to assist patient with adequate nutrition and appropriate food choices  2/22/2024 1757 by Yoselin Murphy  Outcome: Progressing  2/22/2024 1757 by Yoselin Murphy  Outcome: Progressing  Goal: Maintains adequate nutritional intake  Description: INTERVENTIONS:  - Monitor percentage of each meal consumed  - Identify factors contributing to decreased intake, treat as appropriate  - Assist with meals as needed  - Monitor I&O, weight, and lab values if indicated  - Obtain nutrition services referral as needed  2/22/2024 1757 by Yoselin Murphy  Outcome: Progressing  2/22/2024 1757 by Yoselin Murphy  Outcome: Progressing     Problem: DISCHARGE PLANNING  Goal: Discharge to home or other facility with appropriate resources  Description: INTERVENTIONS:  - Identify barriers to discharge w/patient and caregiver  - Arrange for needed discharge resources and transportation as appropriate  - Identify discharge learning needs (meds, wound care, etc.)  - Arrange for interpretive services to assist at discharge as needed  - Refer to Case Management Department for coordinating discharge planning if the patient needs post-hospital services based on physician/advanced practitioner order or complex needs related to functional status, cognitive ability, or social support system  2/22/2024 1757 by Yoselin Murphy  Outcome: Progressing  2/22/2024 1757 by Yoselin Murphy  Outcome: Progressing     Problem: METABOLIC, FLUID AND ELECTROLYTES - ADULT  Goal: Fluid balance maintained  Description: INTERVENTIONS:  - Monitor labs   - Monitor I/O and WT  - Instruct patient on fluid and nutrition as appropriate  - Assess for signs & symptoms of volume excess or deficit  Outcome: Progressing     Problem: Nutrition/Hydration-ADULT  Goal:  Nutrient/Hydration intake appropriate for improving, restoring or maintaining nutritional needs  Description: Monitor and assess patient's nutrition/hydration status for malnutrition. Collaborate with interdisciplinary team and initiate plan and interventions as ordered.  Monitor patient's weight and dietary intake as ordered or per policy. Utilize nutrition screening tool and intervene as necessary. Determine patient's food preferences and provide high-protein, high-caloric foods as appropriate.     INTERVENTIONS:  - Monitor oral intake, urinary output, labs, and treatment plans  - Assess nutrition and hydration status and recommend course of action  - Evaluate amount of meals eaten  - Assist patient with eating if necessary   - Allow adequate time for meals  - Recommend/ encourage appropriate diets, oral nutritional supplements, and vitamin/mineral supplements  - Order, calculate, and assess calorie counts as needed  - Recommend, monitor, and adjust tube feedings and TPN/PPN based on assessed needs  - Assess need for intravenous fluids  - Provide specific nutrition/hydration education as appropriate  - Include patient/family/caregiver in decisions related to nutrition  Outcome: Progressing

## 2024-02-22 NOTE — PROGRESS NOTES
No significant past surgical history [facility] - Progress Note  Celine Roa 49 y.o. 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)  S/p ERCP 2/14/24 w expansion of sphincterotomy + balloon sweeps w removal of debris and no residual defects on cholangiogram, w post procedural abd pain, LFT derangements, and bacteremia   Current sx described to various providers as predominantly located in chest, predominantly located in RUQ, and predominantly located in epigastrium   Recommend troponin and EKG to r/o cardiac etiology  Continue bentyl  Add lidocaine patch  Steroids as below  It is likely that patient will have some degree of continued abdominal pain, it is unlikely to completely resolve this admission given its multifactorial nature and chronicity. If patient stabilizes, would predominantly manage in the outpatient setting    IBD  Treated previously with skyrizi, being discussed for switch to Rinvoq given pt's concerns about corn syrup in infusions  Continue prednisone 40 mg daily until 2/26/24, then 30 mg daily until 3/4/24, then 20 mg daily until 3/11/24, then 20 mg daily until 3/18/24, then 10 mg daily until IBD f/u visit  F/u fecal calprotectin  Daily CRP    Constipation  Resolved  Continue dulcolax daily, miralax 17 g  BID  ______________________________________________________________________    SUBJECTIVE:     MRCP unremarkable. Worsening pain today.     Scheduled Meds:  Current Facility-Administered Medications   Medication Dose Route Frequency Provider Last Rate    acetaminophen  650 mg Oral Q8H [facility] Eagle Babin MD      bisacodyl  10 mg Oral Daily Shady Montano MD      butalbital-acetaminophen-caffeine  1 tablet Oral Q4H PRN Miles Holm, DO      dicyclomine  20 mg Oral 4x Daily (AC & HS) Vipul Partida MD      diphenhydrAMINE  50 mg Intravenous Q4H PRN Miles Holm, DO      enoxaparin  40 mg Subcutaneous Q24H [facility] Vipul Partida MD      famotidine  40 mg Oral BID Eagle Babin MD      folic acid  1 mg Oral Daily Miles Holm, DO      hydrOXYzine HCL  15 mg Oral Daily Miles Holm, DO      levothyroxine  75 mcg Oral Every Other Day Miles Holm, DO      liothyronine  5 mcg Oral Daily Miles Holm, DO      loratadine  10 mg Oral Daily Miles Holm, DO      mirtazapine  15 mg Oral HS Miles Holm, DO      montelukast  10 mg Oral HS Miles Holm, DO      ondansetron  4 mg Intravenous Q6H PRN Miles Homl, DO      oxybutynin  5 mg Oral BID PRN Miles Holm, DO      predniSONE  40 mg Oral Daily Shady Montano MD       Continuous Infusions:   PRN Meds:.  butalbital-acetaminophen-caffeine    diphenhydrAMINE    ondansetron    oxybutynin    OBJECTIVE:     Objective   Blood pressure 119/75, pulse (!) 51, temperature 98.4 °F (36.9 °C), resp. rate 18, weight 75 kg (165 lb 5.5 oz), SpO2 97%, not currently breastfeeding. Body mass index is 27.32 kg/m².  No intake or output data in the 24 hours ending 02/22/24 1413    PHYSICAL EXAM:   General Appearance: Awake and alert, in acute distress  Abdomen: Soft, significantly tender to palpation; no masses or no organomegaly    Invasive Devices       Peripheral Intravenous Line  Duration             Peripheral IV  02/19/24 Right Hand 3 days              Line  Duration             Pump Device  -- days                    LAB RESULTS:      Lab Units 02/22/24  0531 02/20/24  0507 02/19/24  0823 02/18/24  0521 02/17/24  0535 02/16/24  0609 02/14/24  0452 02/13/24  0552 02/09/24  0515 02/08/24  0552   SODIUM mmol/L 138 140 139 138 137 139   < > 141   < > 139   POTASSIUM mmol/L 3.5 3.4* 3.2* 3.6 3.7 3.2*   < > 3.1*   < > 4.1   CHLORIDE mmol/L 103 105 106 105 107 109*   < > 108   < > 106   CO2 mmol/L 29 26 27 26 22 25   < > 26   < > 28   BUN mg/dL 15 9 8 6 2* 6   < > 6   < > 8   CREATININE mg/dL 0.70 0.52* 0.61 0.67 0.58* 0.59*   < > 0.63   < > 0.69   GLUCOSE RANDOM mg/dL 99 88 105 173* 76 76   < > 102   < > 74   CALCIUM mg/dL 8.6 8.1* 8.2* 8.3* 7.5* 7.8*   < > 7.7*   < > 7.9*   MAGNESIUM mg/dL  --   --  1.9 1.9 1.9 1.9  --  1.9  --  2.3   PHOSPHORUS mg/dL  --   --  2.6* 2.7 2.8 2.0*  --   --   --  2.6*    < > = values in this interval not displayed.            Lab Units 02/20/24  0507 02/19/24  0823 02/18/24  0521 02/17/24  0535 02/16/24  0609 02/15/24  0935 09/02/23  0717 08/28/23  0921   TOTAL PROTEIN g/dL 5.7* 5.4* 5.5* 5.4* 5.1* 5.6*   < > 7.1   ALBUMIN g/dL 3.2* 3.1* 3.1* 3.0* 3.0* 3.4*   < > 4.1   TOTAL BILIRUBIN mg/dL 0.64 0.62 1.12* 2.02* 2.43* 2.49*   < > 0.31   BILIRUBIN DIRECT mg/dL 0.20  --   --   --   --  1.86*  --  0.13   AST U/L 35 25 36 87* 239* 677*   < > 22   ALT U/L 112* 127* 179* 253* 399* 577*   < > 26   ALK PHOS U/L 148* 150* 175* 184* 180* 213*   < > 56    < > = values in this interval not displayed.           Lab Units 02/22/24  0531 02/20/24  0507 02/19/24  0527 02/18/24  0521 02/17/24  0535   WBC Thousand/uL 15.55* 11.35* 12.34* 11.71* 7.47   HEMOGLOBIN g/dL 13.1 12.8 13.1 12.9 13.2   HEMATOCRIT % 39.0 38.6 39.5 37.8 39.8   PLATELETS Thousands/uL 407* 377 255 355 339   MCV fL 99* 99* 101* 96 99*       Lab Results   Component Value Date    IRON 76 01/29/2024    TIBC 310 01/29/2024    FERRITIN 440 (H)  01/29/2024       Lab Results   Component Value Date    INR 1.00 02/07/2024    INR 0.91 09/02/2023    INR 0.86 08/28/2023    PROTIME 13.1 02/07/2024    PROTIME 12.2 09/02/2023    PROTIME 11.9 08/28/2023       RADIOLOGY RESULTS:   Procedure: MRI abdomen wo contrast and mrcp    Result Date: 2/21/2024  Narrative: MRI OF THE ABDOMEN WITHOUT CONTRAST WITH MRCP INDICATION: 49 years / Female. re-evaluate biliary anatomy post ERCP given persistent pain and post procedural infection/LFT derangements. 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 Patient presented with right upper quadrant pain and now status post ERCP and sphincterotomy 2/14/2024. Patient with persisting there is clinical suspicion for biliary etiology (cholangitis). COMPARISON: MRI of abdomen dating back to 4/20/2009, with the most recent dated 2/9/2024; CT abdomen and pelvis with contrast 2/15/2024 TECHNIQUE: Multiplanar/multisequence MRI of the abdomen with 3D MRCP was performed without the administration of contrast. Imaging performed on 1.5T MRI. FINDINGS: LOWER CHEST: Unremarkable. LIVER: Normal size and configuration. No suspicious mass. Limited evaluation of hepatic and portal veins on this non-contrast MRI is unremarkable. BILE DUCTS: Grossly stable chronic intrahepatic and extrahepatic biliary ductal dilatation extending to the level of the ampulla. CBD measures 9 mm. Redemonstrated intrahepatic and extrahepatic pneumobilia related to sphincterotomy, which obscures the common bile duct and portions of the intrahepatic bile ducts on MRCP. GALLBLADDER: Post cholecystectomy. PANCREAS: Unremarkable. ADRENAL GLANDS: Unremarkable. SPLEEN: Normal. KIDNEYS/PROXIMAL URETERS: No hydroureteronephrosis. No suspicious renal mass. Subcentimeter right renal cyst. BOWEL: No dilated loops of bowel. PERITONEAL CAVITY/RETROPERITONEUM: No ascites. LYMPH  NODES: No abdominal lymphadenopathy. VESSELS: No aneurysm. ABDOMINAL WALL: Small fat-containing umbilical hernia. BONES: No suspicious osseous lesion. Spinal degenerative changes.     Impression: 1. Grossly stable chronic intrahepatic and extrahepatic biliary ductal dilation. Redemonstrated pneumobilia related to sphincterotomy, which limits visualization of the biliary tree on MRCP. Resident: KRISTINE LAKHANI I, the attending radiologist, have reviewed the images and agree with the final report above. Workstation performed: [id number]     Narrative/Impressions - 3 day look back     Shady Montano M.D.  PGY-5 Gastroenterology Fellow  [facility]  Available on [employer]  [email or web address]

## 2024-02-22 NOTE — ASSESSMENT & PLAN NOTE
Corn allergy noted   Multiple drug allergies noted but tolerated zosyn and unasyn  She tolerated lovenox and bentyl which were added recently

## 2024-02-23 PROBLEM — R10.11 RIGHT UPPER QUADRANT ABDOMINAL PAIN: Status: ACTIVE | Noted: 2017-08-25

## 2024-02-23 LAB
ANION GAP SERPL CALCULATED.3IONS-SCNC: 9 MMOL/L
BASOPHILS # BLD AUTO: 0.05 THOUSANDS/ÂΜL (ref 0–0.1)
BASOPHILS NFR BLD AUTO: 0 % (ref 0–1)
BUN SERPL-MCNC: 11 MG/DL (ref 5–25)
CALCIUM SERPL-MCNC: 8.8 MG/DL (ref 8.4–10.2)
CHLORIDE SERPL-SCNC: 102 MMOL/L (ref 96–108)
CO2 SERPL-SCNC: 26 MMOL/L (ref 21–32)
CREAT SERPL-MCNC: 0.61 MG/DL (ref 0.6–1.3)
EOSINOPHIL # BLD AUTO: 0.03 THOUSAND/ÂΜL (ref 0–0.61)
EOSINOPHIL NFR BLD AUTO: 0 % (ref 0–6)
ERYTHROCYTE [DISTWIDTH] IN BLOOD BY AUTOMATED COUNT: 15.6 % (ref 11.6–15.1)
GFR SERPL CREATININE-BSD FRML MDRD: 105 ML/MIN/1.73SQ M
GLUCOSE SERPL-MCNC: 93 MG/DL (ref 65–140)
HCT VFR BLD AUTO: 42.1 % (ref 34.8–46.1)
HGB BLD-MCNC: 14 G/DL (ref 11.5–15.4)
IMM GRANULOCYTES # BLD AUTO: 0.3 THOUSAND/UL (ref 0–0.2)
IMM GRANULOCYTES NFR BLD AUTO: 2 % (ref 0–2)
LYMPHOCYTES # BLD AUTO: 2.03 THOUSANDS/ÂΜL (ref 0.6–4.47)
LYMPHOCYTES NFR BLD AUTO: 13 % (ref 14–44)
MCH RBC QN AUTO: 32.6 PG (ref 26.8–34.3)
MCHC RBC AUTO-ENTMCNC: 33.3 G/DL (ref 31.4–37.4)
MCV RBC AUTO: 98 FL (ref 82–98)
MONOCYTES # BLD AUTO: 1.19 THOUSAND/ÂΜL (ref 0.17–1.22)
MONOCYTES NFR BLD AUTO: 8 % (ref 4–12)
NEUTROPHILS # BLD AUTO: 11.64 THOUSANDS/ÂΜL (ref 1.85–7.62)
NEUTS SEG NFR BLD AUTO: 77 % (ref 43–75)
NRBC BLD AUTO-RTO: 0 /100 WBCS
PLATELET # BLD AUTO: 483 THOUSANDS/UL (ref 149–390)
PMV BLD AUTO: 8.9 FL (ref 8.9–12.7)
POTASSIUM SERPL-SCNC: 3.8 MMOL/L (ref 3.5–5.3)
RBC # BLD AUTO: 4.29 MILLION/UL (ref 3.81–5.12)
SODIUM SERPL-SCNC: 137 MMOL/L (ref 135–147)
WBC # BLD AUTO: 15.24 THOUSAND/UL (ref 4.31–10.16)

## 2024-02-23 PROCEDURE — 99232 SBSQ HOSP IP/OBS MODERATE 35: CPT | Performed by: INTERNAL MEDICINE

## 2024-02-23 PROCEDURE — 99232 SBSQ HOSP IP/OBS MODERATE 35: CPT | Performed by: STUDENT IN AN ORGANIZED HEALTH CARE EDUCATION/TRAINING PROGRAM

## 2024-02-23 PROCEDURE — 85025 COMPLETE CBC W/AUTO DIFF WBC: CPT | Performed by: INTERNAL MEDICINE

## 2024-02-23 PROCEDURE — 80048 BASIC METABOLIC PNL TOTAL CA: CPT | Performed by: INTERNAL MEDICINE

## 2024-02-23 RX ORDER — HYDROMORPHONE HCL/PF 1 MG/ML
0.5 SYRINGE (ML) INJECTION ONCE
Status: COMPLETED | OUTPATIENT
Start: 2024-02-23 | End: 2024-02-23

## 2024-02-23 RX ADMIN — ACETAMINOPHEN 325MG 650 MG: 325 TABLET ORAL at 06:03

## 2024-02-23 RX ADMIN — MIRTAZAPINE 15 MG: 15 TABLET, FILM COATED ORAL at 21:55

## 2024-02-23 RX ADMIN — HYDROXYZINE HYDROCHLORIDE 15 MG: 10 TABLET ORAL at 09:29

## 2024-02-23 RX ADMIN — DICYCLOMINE HYDROCHLORIDE 20 MG: 20 TABLET ORAL at 06:03

## 2024-02-23 RX ADMIN — TRAMADOL HYDROCHLORIDE 50 MG: 50 TABLET, COATED ORAL at 04:52

## 2024-02-23 RX ADMIN — HYDROMORPHONE HYDROCHLORIDE 0.5 MG: 0.5 INJECTION, SOLUTION INTRAMUSCULAR; INTRAVENOUS; SUBCUTANEOUS at 18:40

## 2024-02-23 RX ADMIN — FOLIC ACID 1 MG: 1 TABLET ORAL at 09:28

## 2024-02-23 RX ADMIN — PREDNISONE 40 MG: 20 TABLET ORAL at 09:28

## 2024-02-23 RX ADMIN — FAMOTIDINE 40 MG: 20 TABLET ORAL at 09:28

## 2024-02-23 RX ADMIN — DIPHENHYDRAMINE HYDROCHLORIDE 50 MG: 50 INJECTION, SOLUTION INTRAMUSCULAR; INTRAVENOUS at 09:28

## 2024-02-23 RX ADMIN — DICYCLOMINE HYDROCHLORIDE 20 MG: 20 TABLET ORAL at 17:11

## 2024-02-23 RX ADMIN — TRAMADOL HYDROCHLORIDE 50 MG: 50 TABLET, COATED ORAL at 23:55

## 2024-02-23 RX ADMIN — MONTELUKAST 10 MG: 10 TABLET, FILM COATED ORAL at 21:55

## 2024-02-23 RX ADMIN — LORATADINE 10 MG: 10 TABLET ORAL at 09:29

## 2024-02-23 RX ADMIN — DIPHENHYDRAMINE HYDROCHLORIDE 50 MG: 50 INJECTION, SOLUTION INTRAMUSCULAR; INTRAVENOUS at 04:52

## 2024-02-23 RX ADMIN — BUTALBITAL, ACETAMINOPHEN, AND CAFFEINE 1 TABLET: 325; 50; 40 TABLET ORAL at 13:47

## 2024-02-23 RX ADMIN — DIPHENHYDRAMINE HYDROCHLORIDE 50 MG: 50 INJECTION, SOLUTION INTRAMUSCULAR; INTRAVENOUS at 23:55

## 2024-02-23 RX ADMIN — DICYCLOMINE HYDROCHLORIDE 20 MG: 20 TABLET ORAL at 21:55

## 2024-02-23 RX ADMIN — DIPHENHYDRAMINE HYDROCHLORIDE 50 MG: 50 INJECTION, SOLUTION INTRAMUSCULAR; INTRAVENOUS at 19:34

## 2024-02-23 RX ADMIN — TRAMADOL HYDROCHLORIDE 50 MG: 50 TABLET, COATED ORAL at 11:18

## 2024-02-23 RX ADMIN — ENOXAPARIN SODIUM 40 MG: 40 INJECTION SUBCUTANEOUS at 09:28

## 2024-02-23 RX ADMIN — DICYCLOMINE HYDROCHLORIDE 20 MG: 20 TABLET ORAL at 11:18

## 2024-02-23 RX ADMIN — DIPHENHYDRAMINE HYDROCHLORIDE 50 MG: 50 INJECTION, SOLUTION INTRAMUSCULAR; INTRAVENOUS at 15:27

## 2024-02-23 RX ADMIN — BISACODYL 10 MG: 5 TABLET, COATED ORAL at 09:28

## 2024-02-23 RX ADMIN — TRIMETHOBENZAMIDE HYDROCHLORIDE 200 MG: 100 INJECTION INTRAMUSCULAR at 19:54

## 2024-02-23 RX ADMIN — TRAMADOL HYDROCHLORIDE 50 MG: 50 TABLET, COATED ORAL at 17:11

## 2024-02-23 RX ADMIN — ACETAMINOPHEN 325MG 650 MG: 325 TABLET ORAL at 13:47

## 2024-02-23 RX ADMIN — ACETAMINOPHEN 325MG 650 MG: 325 TABLET ORAL at 21:55

## 2024-02-23 RX ADMIN — FAMOTIDINE 40 MG: 20 TABLET ORAL at 17:11

## 2024-02-23 RX ADMIN — LIOTHYRONINE SODIUM 5 MCG: 5 TABLET ORAL at 06:04

## 2024-02-23 NOTE — PROGRESS NOTES
[facility]  Progress Note  Name: Celine Roa I  MRN: [id number]  Unit/Bed#: [place] [address] -01 I Date of Admission: 2/13/2024   Date of Service: 2/23/2024 I Hospital Day: 10    Assessment/Plan   * Right upper quadrant abdominal pain  Assessment & Plan  Persistent RUQ pain worse with regular food intake, better while on clears  S/p ERCP and sphincterotomy  MRCP with stable findings  Possible biliary dyskinesia with known sphincter of Oddi dysfunction  Discussed with GI Dr. Montano. Advance to LOW FAT diet and observe   Continue tramadol only for pain     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HCC)  Assessment & Plan  Corn allergy noted   Multiple drug allergies noted but tolerated zosyn and unasyn  She tolerated lovenox and bentyl which were added recently  Advance diet with special notification re: her allergy    GERD without esophagitis  Assessment & Plan  Stable. Continue Pepcid 40 ng BID    Crohn's colitis (HCC)  Assessment & Plan  History of Crohn's disease on Skyrizi as well as methotrexate  Solumedrol switched to PO prednisone 40 per GI  Slow taper per GI  Advance to lo fat diet and observe for recurrent pain     Acquired hypothyroidism  Assessment & Plan  Continue cytomel daily           VTE Pharmacologic Prophylaxis: VTE Score: 3 Moderate Risk (Score 3-4) - Pharmacological DVT Prophylaxis Ordered: enoxaparin (Lovenox).      Patient Centered Rounds: I performed bedside rounds with nursing staff today.   Discussions with Specialists or Other Care Team Provider: GI   Education and Discussions with Family / Patient: Patient declined call to .   Current Length of Stay: 10 day(s)  Current Patient Status: Inpatient   Certification Statement: The patient will continue to require additional inpatient hospital stay due to RUQ pain   Discharge Plan: Anticipate discharge tomorrow to home.    Code Status: Level 1 - Full Code    Subjective:   Seen and examined earlier  Her pain has improved  "significantly  When I asked her what helped, she said \"not eating\" and \"clears\"  Tramadol helped too    Objective:     Vitals:   Temp (24hrs), Av.9 °F (37.2 °C), Min:98.7 °F (37.1 °C), Max:99.2 °F (37.3 °C)    Temp:  [98.7 °F (37.1 °C)-99.2 °F (37.3 °C)] 99.2 °F (37.3 °C)  HR:  [62-90] 90  Resp:  [17-18] 17  BP: (100-122)/(64-84) 122/84  SpO2:  [93 %-95 %] 93 %  Body mass index is 26.99 kg/m².     Input and Output Summary (last 24 hours):   No intake or output data in the 24 hours ending 24 1704    Physical Exam:   Physical Exam  Vitals reviewed.   Constitutional:       Appearance: She is not ill-appearing or diaphoretic.      Comments: More pleasant, smiling   HENT:      Head: Normocephalic and atraumatic.      Nose: No congestion or rhinorrhea.   Eyes:      General: No scleral icterus.  Cardiovascular:      Rate and Rhythm: Normal rate and regular rhythm.   Pulmonary:      Breath sounds: Normal breath sounds. No wheezing or rhonchi.   Abdominal:      Tenderness: There is no abdominal tenderness. There is no guarding.   Musculoskeletal:      Right lower leg: No edema.      Left lower leg: No edema.   Skin:     General: Skin is warm and dry.   Neurological:      Mental Status: She is oriented to person, place, and time.   Psychiatric:         Mood and Affect: Mood normal.         Behavior: Behavior normal.        Additional Data:     Labs:  Results from last 7 days   Lab Units 24  0451   WBC Thousand/uL 15.24*   HEMOGLOBIN g/dL 14.0   HEMATOCRIT % 42.1   PLATELETS Thousands/uL 483*   NEUTROS PCT % 77*   LYMPHS PCT % 13*   MONOS PCT % 8   EOS PCT % 0     Results from last 7 days   Lab Units 24  0451 24  0531 24  0507   SODIUM mmol/L 137   < > 140   POTASSIUM mmol/L 3.8   < > 3.4*   CHLORIDE mmol/L 102   < > 105   CO2 mmol/L 26   < > 26   BUN mg/dL 11   < > 9   CREATININE mg/dL 0.61   < > 0.52*   ANION GAP mmol/L 9   < > 9   CALCIUM mg/dL 8.8   < > 8.1*   ALBUMIN g/dL  --   --  3.2* "   TOTAL BILIRUBIN mg/dL  --   --  0.64   ALK PHOS U/L  --   --  148*   ALT U/L  --   --  112*   AST U/L  --   --  35   GLUCOSE RANDOM mg/dL 93   < > 88    < > = values in this interval not displayed.                       Lines/Drains:  Invasive Devices       Peripheral Intravenous Line  Duration             Peripheral IV 02/23/24 Right;Ventral (anterior) Forearm <1 day              Line  Duration             Pump Device  -- days                          Imaging: Reviewed radiology reports from this admission including: abdominal/pelvic CT and MRI abdomen/MRCP    Recent Cultures (last 7 days):         Last 24 Hours Medication List:   Current Facility-Administered Medications   Medication Dose Route Frequency Provider Last Rate    acetaminophen  650 mg Oral Q8H [facility] Eagle Babin MD      bisacodyl  10 mg Oral Daily Shady Montano MD      butalbital-acetaminophen-caffeine  1 tablet Oral Q4H PRN Miles Holm, DO      dicyclomine  20 mg Oral 4x Daily (AC & HS) Vipul Partida MD      diphenhydrAMINE  50 mg Intravenous Q4H PRN Miles Holm, DO      enoxaparin  40 mg Subcutaneous Q24H [facility] Vipul Partida MD      famotidine  40 mg Oral BID Eagle Babin MD      folic acid  1 mg Oral Daily Miles Holm, DO      hydrOXYzine HCL  15 mg Oral Daily Miles Holm, DO      levothyroxine  75 mcg Oral Every Other Day Miles Holm, DO      liothyronine  5 mcg Oral Daily Miles Holm, DO      loratadine  10 mg Oral Daily Miles Holm, DO      mirtazapine  15 mg Oral HS Miles Holm, DO      montelukast  10 mg Oral HS Miles Holm, DO      ondansetron  4 mg Intravenous Q6H PRN Miles Holm, DO      oxybutynin  5 mg Oral BID PRN Miles Holm, DO      predniSONE  40 mg Oral Daily Shady Montano MD      traMADol  50 mg Oral Q6H PRN Vipul Partida MD          Today, Patient Was Seen By: Vipul Partida MD    **Please Note: This note may  have been constructed using a voice recognition system.**

## 2024-02-23 NOTE — ASSESSMENT & PLAN NOTE
Corn allergy noted   Multiple drug allergies noted but tolerated zosyn and unasyn  She tolerated lovenox and bentyl which were added recently  Advance diet with special notification re: her allergy

## 2024-02-23 NOTE — ASSESSMENT & PLAN NOTE
History of Crohn's disease on Skyrizi as well as methotrexate  Solumedrol switched to PO prednisone 40 per GI  Slow taper per GI  Advance to lo fat diet and observe for recurrent pain

## 2024-02-23 NOTE — UTILIZATION REVIEW
Continued Stay Review    Date: 2/23                          Current Patient Class: IP  Current Level of Care: MS    HPI:49 y.o. female initially admitted on 2/13     Assessment/Plan:   IV analgesia d/c 2/22.  Only using Tramadol for pain.  Advancing diet, slow steroid taper per GI.  Pain is improving.  Constipation resolved.  Discussion on starting Rinvoq with GI.     Vital Signs:   Date/Time Temp Pulse Resp BP MAP (mmHg) SpO2 O2 Device Patient Position - Orthostatic VS   02/23/24 15:48:38 99.2 °F (37.3 °C) 90 17 122/84 97 93 % None (Room air) --   02/23/24 07:00:14 98.7 °F (37.1 °C) 75 17 116/69 85 95 % None (Room air) --   02/22/24 21:02:49 98.9 °F (37.2 °C) 62 18 100/64 76 95 % None (Room air) Lying   02/22/24 1459 98.3 °F (36.8 °C) 84 18 148/95 113 97 % -- --   02/22/24 0850 -- -- -- -- -- -- None (Room air) --   02/22/24 06:55:38 98.4 °F (36.9 °C) 51 Abnormal  18 119/75 90 97 % -- --   02/21/24 21:35:27 98 °F (36.7 °C) 62 -- 116/76 89 96 % -- --   02/21/24 15:53:45 98.6 °F (37 °C) -- 16 119/77 91 -- -- Lying   02/21/24 0930 -- -- -- -- -- -- None (Room air) --   02/21/24 08:58:42 -- 79 20 120/83 95 95 % -- --   02/21/24 0815 -- -- -- -- -- -- None (Room air) --       Pertinent Labs/Diagnostic Results:       Results from last 7 days   Lab Units 02/23/24  0451 02/22/24  0531 02/20/24  0507 02/19/24  0527 02/18/24  0521   WBC Thousand/uL 15.24* 15.55* 11.35* 12.34* 11.71*   HEMOGLOBIN g/dL 14.0 13.1 12.8 13.1 12.9   HEMATOCRIT % 42.1 39.0 38.6 39.5 37.8   PLATELETS Thousands/uL 483* 407* 377 255 355   NEUTROS ABS Thousands/µL 11.64* 10.00* 7.25 8.82* 9.81*         Results from last 7 days   Lab Units 02/23/24  0451 02/22/24  0531 02/20/24  0507 02/19/24  0823 02/18/24  0521 02/17/24  0535   SODIUM mmol/L 137 138 140 139 138 137   POTASSIUM mmol/L 3.8 3.5 3.4* 3.2* 3.6 3.7   CHLORIDE mmol/L 102 103 105 106 105 107   CO2 mmol/L 26 29 26 27 26 22   ANION GAP mmol/L 9 6 9 6 7 8   BUN mg/dL 11 15 9 8 6 2*   CREATININE  mg/dL 0.61 0.70 0.52* 0.61 0.67 0.58*   EGFR ml/min/1.73sq m 105 100 111 106 102 107   CALCIUM mg/dL 8.8 8.6 8.1* 8.2* 8.3* 7.5*   MAGNESIUM mg/dL  --   --   --  1.9 1.9 1.9   PHOSPHORUS mg/dL  --   --   --  2.6* 2.7 2.8     Results from last 7 days   Lab Units 02/20/24  0507 02/19/24  0823 02/18/24  0521 02/17/24  0535   AST U/L 35 25 36 87*   ALT U/L 112* 127* 179* 253*   ALK PHOS U/L 148* 150* 175* 184*   TOTAL PROTEIN g/dL 5.7* 5.4* 5.5* 5.4*   ALBUMIN g/dL 3.2* 3.1* 3.1* 3.0*   TOTAL BILIRUBIN mg/dL 0.64 0.62 1.12* 2.02*   BILIRUBIN DIRECT mg/dL 0.20  --   --   --          Results from last 7 days   Lab Units 02/23/24  0451 02/22/24  0531 02/20/24  0507 02/19/24  0823 02/18/24  0521 02/17/24  0535   GLUCOSE RANDOM mg/dL 93 99 88 105 173* 76       Results from last 7 days   Lab Units 02/19/24  0823   LIPASE u/L 9*     Results from last 7 days   Lab Units 02/20/24  0507   CRP mg/L 7.6*     Medications:   Scheduled Medications:  acetaminophen, 650 mg, Oral, Q8H KARISSA  bisacodyl, 10 mg, Oral, Daily  dicyclomine, 20 mg, Oral, 4x Daily (AC & HS)  enoxaparin, 40 mg, Subcutaneous, Q24H KARISSA  famotidine, 40 mg, Oral, BID  folic acid, 1 mg, Oral, Daily  hydrOXYzine HCL, 15 mg, Oral, Daily  levothyroxine, 75 mcg, Oral, Every Other Day  liothyronine, 5 mcg, Oral, Daily  loratadine, 10 mg, Oral, Daily  mirtazapine, 15 mg, Oral, HS  montelukast, 10 mg, Oral, HS  predniSONE, 40 mg, Oral, Daily      Continuous IV Infusions:     PRN Meds:  butalbital-acetaminophen-caffeine, 1 tablet, Oral, Q4H PRN - x 1 2/23  diphenhydrAMINE, 50 mg, Intravenous, Q4H PRN - x 5 2/22, x 3 2/23  ondansetron, 4 mg, Intravenous, Q6H PRN  oxybutynin, 5 mg, Oral, BID PRN  traMADol, 50 mg, Oral, Q6H PRN - x1 2/22,  x 2 2/23      Discharge Plan: home    Network Utilization Review Department  ATTENTION: Please call with any questions or concerns to [phone] and carefully listen to the prompts so that you are directed to the right person. All voicemails  are confidential.   For Discharge needs, contact Care Management DC Support Team at [phone] opt. 2  Send all requests for admission clinical reviews, approved or denied determinations and any other requests to dedicated fax number below belonging to the campus where the patient is receiving treatment. List of dedicated fax numbers for the Facilities:  FACILITY NAME UR FAX NUMBER   ADMISSION DENIALS (Administrative/Medical Necessity) [phone]   DISCHARGE SUPPORT TEAM (NETWORK) [phone]   PARENT CHILD HEALTH (Maternity/NICU/Pediatrics) [phone]   [facility] [phone]   [facility] [phone]   [facility] [phone]   [facility] [phone]   [facility] [phone]   [facility] [phone]   [facility] [phone]   [facility] [phone]   [facility] [phone]   [facility] [phone]   General acute hospital [phone]   [facility] [phone]

## 2024-02-23 NOTE — PROGRESS NOTES
[facility] - Progress Note  Celine Roa 49 y.o. 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 If patient stabilizes, would predominantly manage in the outpatient setting  Advance diet as tolerated to low fat      IBD  Treated previously with skyrizi, being discussed for switch to Rinvoq given pt's concerns about corn syrup in infusions  Continue prednisone 40 mg daily until 2/26/24, then 30 mg daily until 3/4/24, then 20 mg daily until 3/11/24, then 20 mg daily until 3/18/24, then 10 mg daily until IBD f/u visit  F/u fecal calprotectin  Daily CRP     Constipation  Resolved  Continue dulcolax daily, miralax 17 g BID  ______________________________________________________________________    SUBJECTIVE:     Overnight w severe abdominal pain, resolved as of this AM. Doing well today.     Scheduled Meds:  Current  Facility-Administered Medications   Medication Dose Route Frequency Provider Last Rate    acetaminophen  650 mg Oral Q8H [facility] Eagle Babin MD      bisacodyl  10 mg Oral Daily Shady Montano MD      butalbital-acetaminophen-caffeine  1 tablet Oral Q4H PRN Miles Holm, DO      dicyclomine  20 mg Oral 4x Daily (AC & HS) Vipul Partida MD      diphenhydrAMINE  50 mg Intravenous Q4H PRN Miles Holm, DO      enoxaparin  40 mg Subcutaneous Q24H [facility] Vipul Partida MD      famotidine  40 mg Oral BID Eagle Bbain MD      folic acid  1 mg Oral Daily Miles Holm, DO      hydrOXYzine HCL  15 mg Oral Daily Miles Holm, DO      levothyroxine  75 mcg Oral Every Other Day Miles Holm, DO      liothyronine  5 mcg Oral Daily Miles Holm, DO      loratadine  10 mg Oral Daily Miles Holm, DO      mirtazapine  15 mg Oral HS Miles Holm, DO      montelukast  10 mg Oral HS Miles Holm, DO      ondansetron  4 mg Intravenous Q6H PRN Miles Holm, DO      oxybutynin  5 mg Oral BID PRN Miles Holm, DO      predniSONE  40 mg Oral Daily Shady Montano MD      traMADol  50 mg Oral Q6H PRN Vipul Partida MD       Continuous Infusions:   PRN Meds:.  butalbital-acetaminophen-caffeine    diphenhydrAMINE    ondansetron    oxybutynin    traMADol    OBJECTIVE:     Objective   Blood pressure 116/69, pulse 75, temperature 98.7 °F (37.1 °C), resp. rate 17, weight 74.1 kg (163 lb 5.8 oz), SpO2 95%, not currently breastfeeding. Body mass index is 26.99 kg/m².  No intake or output data in the 24 hours ending 02/23/24 2405    PHYSICAL EXAM:   General Appearance: Awake and alert, in no acute distress  Abdomen: Soft, non-tender, non-distended; no masses or no organomegaly    Invasive Devices       Peripheral Intravenous Line  Duration             Peripheral IV 02/23/24 Right;Ventral (anterior) Forearm <1 day              Line  Duration             Pump Device   -- days                    LAB RESULTS:      Lab Units 02/23/24  0451 02/22/24  0531 02/20/24  0507 02/19/24  0823 02/18/24  0521 02/17/24  0535 02/16/24  0609 02/14/24  0452 02/13/24  0552 02/09/24  0515 02/08/24  0552   SODIUM mmol/L 137 138 140 139 138 137 139   < > 141   < > 139   POTASSIUM mmol/L 3.8 3.5 3.4* 3.2* 3.6 3.7 3.2*   < > 3.1*   < > 4.1   CHLORIDE mmol/L 102 103 105 106 105 107 109*   < > 108   < > 106   CO2 mmol/L 26 29 26 27 26 22 25   < > 26   < > 28   BUN mg/dL 11 15 9 8 6 2* 6   < > 6   < > 8   CREATININE mg/dL 0.61 0.70 0.52* 0.61 0.67 0.58* 0.59*   < > 0.63   < > 0.69   GLUCOSE RANDOM mg/dL 93 99 88 105 173* 76 76   < > 102   < > 74   CALCIUM mg/dL 8.8 8.6 8.1* 8.2* 8.3* 7.5* 7.8*   < > 7.7*   < > 7.9*   MAGNESIUM mg/dL  --   --   --  1.9 1.9 1.9 1.9  --  1.9  --  2.3   PHOSPHORUS mg/dL  --   --   --  2.6* 2.7 2.8 2.0*  --   --   --  2.6*    < > = values in this interval not displayed.            Lab Units 02/20/24  0507 02/19/24  0823 02/18/24  0521 02/17/24  0535 02/16/24  0609 02/15/24  0935 09/02/23  0717 08/28/23  0921   TOTAL PROTEIN g/dL 5.7* 5.4* 5.5* 5.4* 5.1* 5.6*   < > 7.1   ALBUMIN g/dL 3.2* 3.1* 3.1* 3.0* 3.0* 3.4*   < > 4.1   TOTAL BILIRUBIN mg/dL 0.64 0.62 1.12* 2.02* 2.43* 2.49*   < > 0.31   BILIRUBIN DIRECT mg/dL 0.20  --   --   --   --  1.86*  --  0.13   AST U/L 35 25 36 87* 239* 677*   < > 22   ALT U/L 112* 127* 179* 253* 399* 577*   < > 26   ALK PHOS U/L 148* 150* 175* 184* 180* 213*   < > 56    < > = values in this interval not displayed.           Lab Units 02/23/24  0451 02/22/24  0531 02/20/24  0507 02/19/24  0527 02/18/24  0521   WBC Thousand/uL 15.24* 15.55* 11.35* 12.34* 11.71*   HEMOGLOBIN g/dL 14.0 13.1 12.8 13.1 12.9   HEMATOCRIT % 42.1 39.0 38.6 39.5 37.8   PLATELETS Thousands/uL 483* 407* 377 255 355   MCV fL 98 99* 99* 101* 96       Lab Results   Component Value Date    IRON 76 01/29/2024    TIBC 310 01/29/2024    FERRITIN 440 (H) 01/29/2024       Lab Results    Component Value Date    INR 1.00 02/07/2024    INR 0.91 09/02/2023    INR 0.86 08/28/2023    PROTIME 13.1 02/07/2024    PROTIME 12.2 09/02/2023    PROTIME 11.9 08/28/2023       RADIOLOGY RESULTS:   Procedure: MRI abdomen wo contrast and mrcp    Result Date: 2/21/2024  Narrative: MRI OF THE ABDOMEN WITHOUT CONTRAST WITH MRCP INDICATION: 49 years / Female. re-evaluate biliary anatomy post ERCP given persistent pain and post procedural infection/LFT derangements. 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 Patient presented with right upper quadrant pain and now status post ERCP and sphincterotomy 2/14/2024. Patient with persisting there is clinical suspicion for biliary etiology (cholangitis). COMPARISON: MRI of abdomen dating back to 4/20/2009, with the most recent dated 2/9/2024; CT abdomen and pelvis with contrast 2/15/2024 TECHNIQUE: Multiplanar/multisequence MRI of the abdomen with 3D MRCP was performed without the administration of contrast. Imaging performed on 1.5T MRI. FINDINGS: LOWER CHEST: Unremarkable. LIVER: Normal size and configuration. No suspicious mass. Limited evaluation of hepatic and portal veins on this non-contrast MRI is unremarkable. BILE DUCTS: Grossly stable chronic intrahepatic and extrahepatic biliary ductal dilatation extending to the level of the ampulla. CBD measures 9 mm. Redemonstrated intrahepatic and extrahepatic pneumobilia related to sphincterotomy, which obscures the common bile duct and portions of the intrahepatic bile ducts on MRCP. GALLBLADDER: Post cholecystectomy. PANCREAS: Unremarkable. ADRENAL GLANDS: Unremarkable. SPLEEN: Normal. KIDNEYS/PROXIMAL URETERS: No hydroureteronephrosis. No suspicious renal mass. Subcentimeter right renal cyst. BOWEL: No dilated loops of bowel. PERITONEAL CAVITY/RETROPERITONEUM: No ascites. LYMPH NODES: No abdominal  lymphadenopathy. VESSELS: No aneurysm. ABDOMINAL WALL: Small fat-containing umbilical hernia. BONES: No suspicious osseous lesion. Spinal degenerative changes.     Impression: 1. Grossly stable chronic intrahepatic and extrahepatic biliary ductal dilation. Redemonstrated pneumobilia related to sphincterotomy, which limits visualization of the biliary tree on MRCP. Resident: KRISTINE LAKHANI I, the attending radiologist, have reviewed the images and agree with the final report above. Workstation performed: [id number]     Narrative/Impressions - 3 day look back     Shady Montano M.D.  PGY-5 Gastroenterology Fellow  [facility]  Available on [employer]  [email or web address]

## 2024-02-23 NOTE — QUICK NOTE
She had severe pain again with solids  Switch back to clears  Dilaudid x 1 time dose  GI follow up tomorrow

## 2024-02-23 NOTE — ASSESSMENT & PLAN NOTE
Persistent RUQ pain worse with regular food intake, better while on clears  S/p ERCP and sphincterotomy  MRCP with stable findings  Possible biliary dyskinesia with known sphincter of Oddi dysfunction  Discussed with GI Dr. Montano. Advance to LOW FAT diet and observe   Continue tramadol only for pain

## 2024-02-24 LAB
ALBUMIN SERPL BCP-MCNC: 3.4 G/DL (ref 3.5–5)
ALP SERPL-CCNC: 117 U/L (ref 34–104)
ALT SERPL W P-5'-P-CCNC: 91 U/L (ref 7–52)
AST SERPL W P-5'-P-CCNC: 38 U/L (ref 13–39)
BILIRUB DIRECT SERPL-MCNC: 0.18 MG/DL (ref 0–0.2)
BILIRUB SERPL-MCNC: 0.71 MG/DL (ref 0.2–1)
PROT SERPL-MCNC: 6.2 G/DL (ref 6.4–8.4)

## 2024-02-24 PROCEDURE — 99232 SBSQ HOSP IP/OBS MODERATE 35: CPT | Performed by: INTERNAL MEDICINE

## 2024-02-24 PROCEDURE — 80076 HEPATIC FUNCTION PANEL: CPT | Performed by: INTERNAL MEDICINE

## 2024-02-24 RX ORDER — DICYCLOMINE HCL 20 MG
20 TABLET ORAL 3 TIMES DAILY PRN
Status: DISCONTINUED | OUTPATIENT
Start: 2024-02-24 | End: 2024-03-02

## 2024-02-24 RX ORDER — BISACODYL 5 MG/1
5 TABLET, DELAYED RELEASE ORAL DAILY PRN
Status: DISCONTINUED | OUTPATIENT
Start: 2024-02-24 | End: 2024-03-07 | Stop reason: HOSPADM

## 2024-02-24 RX ORDER — POLYETHYLENE GLYCOL 3350 17 G/17G
17 POWDER, FOR SOLUTION ORAL DAILY PRN
Status: DISCONTINUED | OUTPATIENT
Start: 2024-02-24 | End: 2024-02-24

## 2024-02-24 RX ORDER — HYDROMORPHONE HCL/PF 1 MG/ML
0.5 SYRINGE (ML) INJECTION ONCE
Status: COMPLETED | OUTPATIENT
Start: 2024-02-24 | End: 2024-02-24

## 2024-02-24 RX ORDER — LIDOCAINE 50 MG/G
1 PATCH TOPICAL DAILY
Status: DISCONTINUED | OUTPATIENT
Start: 2024-02-24 | End: 2024-02-25

## 2024-02-24 RX ORDER — HYDROMORPHONE HCL IN WATER/PF 6 MG/30 ML
0.2 PATIENT CONTROLLED ANALGESIA SYRINGE INTRAVENOUS ONCE
Status: COMPLETED | OUTPATIENT
Start: 2024-02-24 | End: 2024-02-24

## 2024-02-24 RX ORDER — SODIUM CHLORIDE, SODIUM GLUCONATE, SODIUM ACETATE, POTASSIUM CHLORIDE, MAGNESIUM CHLORIDE, SODIUM PHOSPHATE, DIBASIC, AND POTASSIUM PHOSPHATE .53; .5; .37; .037; .03; .012; .00082 G/100ML; G/100ML; G/100ML; G/100ML; G/100ML; G/100ML; G/100ML
100 INJECTION, SOLUTION INTRAVENOUS CONTINUOUS
Status: DISCONTINUED | OUTPATIENT
Start: 2024-02-24 | End: 2024-03-07 | Stop reason: HOSPADM

## 2024-02-24 RX ORDER — POLYETHYLENE GLYCOL 3350 17 G/17G
17 POWDER, FOR SOLUTION ORAL 3 TIMES DAILY
Status: DISCONTINUED | OUTPATIENT
Start: 2024-02-24 | End: 2024-02-25

## 2024-02-24 RX ORDER — SUCRALFATE 1 G/1
1 TABLET ORAL
Status: DISCONTINUED | OUTPATIENT
Start: 2024-02-24 | End: 2024-02-25

## 2024-02-24 RX ORDER — PANTOPRAZOLE SODIUM 40 MG/10ML
40 INJECTION, POWDER, LYOPHILIZED, FOR SOLUTION INTRAVENOUS
Status: DISCONTINUED | OUTPATIENT
Start: 2024-02-25 | End: 2024-02-28

## 2024-02-24 RX ORDER — HYDROMORPHONE HCL IN WATER/PF 6 MG/30 ML
0.2 PATIENT CONTROLLED ANALGESIA SYRINGE INTRAVENOUS EVERY 6 HOURS PRN
Status: DISCONTINUED | OUTPATIENT
Start: 2024-02-24 | End: 2024-02-25

## 2024-02-24 RX ORDER — PANTOPRAZOLE SODIUM 40 MG/1
40 TABLET, DELAYED RELEASE ORAL
Status: DISCONTINUED | OUTPATIENT
Start: 2024-02-24 | End: 2024-02-24

## 2024-02-24 RX ADMIN — DIPHENHYDRAMINE HYDROCHLORIDE 50 MG: 50 INJECTION, SOLUTION INTRAMUSCULAR; INTRAVENOUS at 18:19

## 2024-02-24 RX ADMIN — HYDROMORPHONE HYDROCHLORIDE 0.5 MG: 0.5 INJECTION, SOLUTION INTRAMUSCULAR; INTRAVENOUS; SUBCUTANEOUS at 13:41

## 2024-02-24 RX ADMIN — DIPHENHYDRAMINE HYDROCHLORIDE 50 MG: 50 INJECTION, SOLUTION INTRAMUSCULAR; INTRAVENOUS at 04:57

## 2024-02-24 RX ADMIN — SODIUM CHLORIDE, SODIUM GLUCONATE, SODIUM ACETATE, POTASSIUM CHLORIDE, MAGNESIUM CHLORIDE, SODIUM PHOSPHATE, DIBASIC, AND POTASSIUM PHOSPHATE 100 ML/HR: .53; .5; .37; .037; .03; .012; .00082 INJECTION, SOLUTION INTRAVENOUS at 18:21

## 2024-02-24 RX ADMIN — HYDROMORPHONE HYDROCHLORIDE 0.2 MG: 0.2 INJECTION, SOLUTION INTRAMUSCULAR; INTRAVENOUS; SUBCUTANEOUS at 18:24

## 2024-02-24 RX ADMIN — BISACODYL 10 MG: 5 TABLET, COATED ORAL at 09:35

## 2024-02-24 RX ADMIN — PANTOPRAZOLE SODIUM 40 MG: 40 TABLET, DELAYED RELEASE ORAL at 09:35

## 2024-02-24 RX ADMIN — MIRTAZAPINE 15 MG: 15 TABLET, FILM COATED ORAL at 21:00

## 2024-02-24 RX ADMIN — DICYCLOMINE HYDROCHLORIDE 20 MG: 20 TABLET ORAL at 11:42

## 2024-02-24 RX ADMIN — TRAMADOL HYDROCHLORIDE 50 MG: 50 TABLET, COATED ORAL at 05:54

## 2024-02-24 RX ADMIN — FOLIC ACID 1 MG: 1 TABLET ORAL at 09:35

## 2024-02-24 RX ADMIN — LIDOCAINE 5% 1 PATCH: 700 PATCH TOPICAL at 17:33

## 2024-02-24 RX ADMIN — DICYCLOMINE HYDROCHLORIDE 20 MG: 20 TABLET ORAL at 05:54

## 2024-02-24 RX ADMIN — ENOXAPARIN SODIUM 40 MG: 40 INJECTION SUBCUTANEOUS at 09:35

## 2024-02-24 RX ADMIN — HYDROMORPHONE HYDROCHLORIDE 0.2 MG: 0.2 INJECTION, SOLUTION INTRAMUSCULAR; INTRAVENOUS; SUBCUTANEOUS at 21:54

## 2024-02-24 RX ADMIN — DIPHENHYDRAMINE HYDROCHLORIDE 50 MG: 50 INJECTION, SOLUTION INTRAMUSCULAR; INTRAVENOUS at 14:06

## 2024-02-24 RX ADMIN — HYDROXYZINE HYDROCHLORIDE 15 MG: 10 TABLET ORAL at 09:35

## 2024-02-24 RX ADMIN — LORATADINE 10 MG: 10 TABLET ORAL at 09:36

## 2024-02-24 RX ADMIN — DIPHENHYDRAMINE HYDROCHLORIDE 50 MG: 50 INJECTION, SOLUTION INTRAMUSCULAR; INTRAVENOUS at 09:38

## 2024-02-24 RX ADMIN — LIOTHYRONINE SODIUM 5 MCG: 5 TABLET ORAL at 04:54

## 2024-02-24 RX ADMIN — DIPHENHYDRAMINE HYDROCHLORIDE 50 MG: 50 INJECTION, SOLUTION INTRAMUSCULAR; INTRAVENOUS at 22:19

## 2024-02-24 RX ADMIN — MONTELUKAST 10 MG: 10 TABLET, FILM COATED ORAL at 21:00

## 2024-02-24 RX ADMIN — PREDNISONE 40 MG: 20 TABLET ORAL at 09:35

## 2024-02-24 RX ADMIN — HYDROMORPHONE HYDROCHLORIDE 0.5 MG: 0.5 INJECTION, SOLUTION INTRAMUSCULAR; INTRAVENOUS; SUBCUTANEOUS at 13:02

## 2024-02-24 RX ADMIN — TRAMADOL HYDROCHLORIDE 50 MG: 50 TABLET, COATED ORAL at 11:42

## 2024-02-24 RX ADMIN — ACETAMINOPHEN 325MG 650 MG: 325 TABLET ORAL at 04:53

## 2024-02-24 RX ADMIN — LEVOTHYROXINE SODIUM 75 MCG: 75 TABLET ORAL at 04:53

## 2024-02-24 RX ADMIN — FAMOTIDINE 40 MG: 20 TABLET ORAL at 09:35

## 2024-02-24 NOTE — ASSESSMENT & PLAN NOTE
Persistent RUQ pain worse with regular food intake, better while on clears  S/p ERCP and sphincterotomy  MRCP with stable findings  Possible biliary dyskinesia with known sphincter of Oddi dysfunction  Possible uncontrolled GERD while off protonix per GI  Protonix restarted  Advance diet and observe for recurrent pain   Discussed with Dr. Noonan

## 2024-02-24 NOTE — PROGRESS NOTES
[facility] - Progress Note  Celine Roa 49 y.o. female MRN: [id number]  Unit/Bed#: [address] 208-01 Encounter: [id number]      ASSESSMENT & PLAN:  47 y/o female w hx of IBD on skyrizi, mast cell activation syndrome, Hashimoto's thyroiditis, questionable Sphincter of Oddi dysfunction s/p sphincterotomy presenting w intractable RUQ abd pain, s/p ERCP 2/14/24 w expansion of sphincterotomy + balloon sweeps w removal of debris and no residual defects on cholangiogram, w post procedural abdominal pain, LFT derangements, and bacteremia.  She continues to have right upper quadrant pain with inability to tolerate anything more than liquids necessitating wider differential.     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 If patient stabilizes, would predominantly manage in the outpatient setting  Advance diet as tolerated      IBD  Treated previously with skyrizi, being discussed for switch to Rinvoq given pt's concerns about corn syrup in infusions  Continue prednisone 40 mg daily until 2/26/24, then 30 mg daily until 3/4/24, then 20 mg daily until 3/11/24, then 20 mg daily until 3/18/24, then 10 mg daily until IBD f/u visit  F/u fecal calprotectin  Daily CRP     Constipation  Resolved  Continue dulcolax daily, miralax 17 g as  needed  ______________________________________________________________________    SUBJECTIVE:  Abdominal pain improved this morning and then the thought of solid diet made pain worse.  Patient looks comfortable when in the room.  Some nausea, no vomiting.  Will change to clear liquid diet.    Scheduled Meds:  Current Facility-Administered Medications   Medication Dose Route Frequency Provider Last Rate    acetaminophen  650 mg Oral Q8H [facility] Eagle Babin MD      bisacodyl  10 mg Oral Daily Shady Montano MD      butalbital-acetaminophen-caffeine  1 tablet Oral Q4H PRN Miles Holm, DO      dicyclomine  20 mg Oral 4x Daily (AC & HS) Vipul Partida MD      diphenhydrAMINE  50 mg Intravenous Q4H PRN Miles Holm, DO      enoxaparin  40 mg Subcutaneous Q24H [facility] Vipul Partida MD      famotidine  40 mg Oral BID Eagle Babin MD      folic acid  1 mg Oral Daily Miles Holm, DO      hydrOXYzine HCL  15 mg Oral Daily Miles Holm, DO      levothyroxine  75 mcg Oral Every Other Day Miles Holm, DO      liothyronine  5 mcg Oral Daily Miles Holm, DO      loratadine  10 mg Oral Daily Miles Holm, DO      mirtazapine  15 mg Oral HS Miles Holm, DO      montelukast  10 mg Oral HS Miles Holm, DO      ondansetron  4 mg Intravenous Q6H PRN Miles Holm, DO      oxybutynin  5 mg Oral BID PRN Miles Holm, DO      pantoprazole  40 mg Oral Daily Before Breakfast Hair Noonan MD      predniSONE  40 mg Oral Daily Shady Montano MD      sucralfate  1 g Oral 4x Daily (AC & HS) Hair Noonan MD      traMADol  50 mg Oral Q6H PRN Vipul Partida MD      trimethobenzamide  200 mg Intramuscular Q6H PRN Tommy Rapp PA-C       Continuous Infusions:   PRN Meds:.  butalbital-acetaminophen-caffeine    diphenhydrAMINE    ondansetron    oxybutynin    traMADol    trimethobenzamide    OBJECTIVE:     Objective   Blood pressure 122/79, pulse  70, temperature 98.8 °F (37.1 °C), resp. rate 17, weight 75.2 kg (165 lb 12.6 oz), SpO2 95%, not currently breastfeeding. Body mass index is 27.39 kg/m².  No intake or output data in the 24 hours ending 02/24/24 1503    PHYSICAL EXAM:   General Appearance: Awake and alert, in no acute distress  Abdomen: Soft, non-tender, non-distended; no masses or no organomegaly    Invasive Devices       Peripheral Intravenous Line  Duration             Peripheral IV 02/23/24 Right;Ventral (anterior) Forearm 1 day              Line  Duration             Pump Device  -- days                    LAB RESULTS:      Lab Units 02/23/24  0451 02/22/24  0531 02/20/24  0507 02/19/24  0823 02/18/24  0521 02/17/24  0535 02/16/24  0609 02/14/24  0452 02/13/24  0552 02/09/24  0515 02/08/24  0552   SODIUM mmol/L 137 138 140 139 138 137 139   < > 141   < > 139   POTASSIUM mmol/L 3.8 3.5 3.4* 3.2* 3.6 3.7 3.2*   < > 3.1*   < > 4.1   CHLORIDE mmol/L 102 103 105 106 105 107 109*   < > 108   < > 106   CO2 mmol/L 26 29 26 27 26 22 25   < > 26   < > 28   BUN mg/dL 11 15 9 8 6 2* 6   < > 6   < > 8   CREATININE mg/dL 0.61 0.70 0.52* 0.61 0.67 0.58* 0.59*   < > 0.63   < > 0.69   GLUCOSE RANDOM mg/dL 93 99 88 105 173* 76 76   < > 102   < > 74   CALCIUM mg/dL 8.8 8.6 8.1* 8.2* 8.3* 7.5* 7.8*   < > 7.7*   < > 7.9*   MAGNESIUM mg/dL  --   --   --  1.9 1.9 1.9 1.9  --  1.9  --  2.3   PHOSPHORUS mg/dL  --   --   --  2.6* 2.7 2.8 2.0*  --   --   --  2.6*    < > = values in this interval not displayed.            Lab Units 02/24/24  0436 02/20/24  0507 02/19/24  0823 02/18/24  0521 02/17/24  0535 02/16/24  0609 02/15/24  0935 09/02/23  0717 08/28/23  0921   TOTAL PROTEIN g/dL 6.2* 5.7* 5.4* 5.5* 5.4*   < > 5.6*   < > 7.1   ALBUMIN g/dL 3.4* 3.2* 3.1* 3.1* 3.0*   < > 3.4*   < > 4.1   TOTAL BILIRUBIN mg/dL 0.71 0.64 0.62 1.12* 2.02*   < > 2.49*   < > 0.31   BILIRUBIN DIRECT mg/dL 0.18 0.20  --   --   --   --  1.86*  --  0.13   AST U/L 38 35 25 36 87*   < > 677*   <  > 22   ALT U/L 91* 112* 127* 179* 253*   < > 577*   < > 26   ALK PHOS U/L 117* 148* 150* 175* 184*   < > 213*   < > 56    < > = values in this interval not displayed.           Lab Units 02/23/24  0451 02/22/24  0531 02/20/24  0507 02/19/24  0527 02/18/24  0521   WBC Thousand/uL 15.24* 15.55* 11.35* 12.34* 11.71*   HEMOGLOBIN g/dL 14.0 13.1 12.8 13.1 12.9   HEMATOCRIT % 42.1 39.0 38.6 39.5 37.8   PLATELETS Thousands/uL 483* 407* 377 255 355   MCV fL 98 99* 99* 101* 96       Lab Results   Component Value Date    IRON 76 01/29/2024    TIBC 310 01/29/2024    FERRITIN 440 (H) 01/29/2024       Lab Results   Component Value Date    INR 1.00 02/07/2024    INR 0.91 09/02/2023    INR 0.86 08/28/2023    PROTIME 13.1 02/07/2024    PROTIME 12.2 09/02/2023    PROTIME 11.9 08/28/2023       RADIOLOGY RESULTS:   Procedure: MRI abdomen wo contrast and mrcp    Result Date: 2/21/2024  Narrative: MRI OF THE ABDOMEN WITHOUT CONTRAST WITH MRCP INDICATION: 49 years / Female. re-evaluate biliary anatomy post ERCP given persistent pain and post procedural infection/LFT derangements. 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 Patient presented with right upper quadrant pain and now status post ERCP and sphincterotomy 2/14/2024. Patient with persisting there is clinical suspicion for biliary etiology (cholangitis). COMPARISON: MRI of abdomen dating back to 4/20/2009, with the most recent dated 2/9/2024; CT abdomen and pelvis with contrast 2/15/2024 TECHNIQUE: Multiplanar/multisequence MRI of the abdomen with 3D MRCP was performed without the administration of contrast. Imaging performed on 1.5T MRI. FINDINGS: LOWER CHEST: Unremarkable. LIVER: Normal size and configuration. No suspicious mass. Limited evaluation of hepatic and portal veins on this non-contrast MRI is unremarkable. BILE DUCTS: Grossly stable chronic  intrahepatic and extrahepatic biliary ductal dilatation extending to the level of the ampulla. CBD measures 9 mm. Redemonstrated intrahepatic and extrahepatic pneumobilia related to sphincterotomy, which obscures the common bile duct and portions of the intrahepatic bile ducts on MRCP. GALLBLADDER: Post cholecystectomy. PANCREAS: Unremarkable. ADRENAL GLANDS: Unremarkable. SPLEEN: Normal. KIDNEYS/PROXIMAL URETERS: No hydroureteronephrosis. No suspicious renal mass. Subcentimeter right renal cyst. BOWEL: No dilated loops of bowel. PERITONEAL CAVITY/RETROPERITONEUM: No ascites. LYMPH NODES: No abdominal lymphadenopathy. VESSELS: No aneurysm. ABDOMINAL WALL: Small fat-containing umbilical hernia. BONES: No suspicious osseous lesion. Spinal degenerative changes.     Impression: 1. Grossly stable chronic intrahepatic and extrahepatic biliary ductal dilation. Redemonstrated pneumobilia related to sphincterotomy, which limits visualization of the biliary tree on MRCP. Resident: KRISTINE LAKHANI I, the attending radiologist, have reviewed the images and agree with the final report above. Workstation performed: [id number]     Narrative/Impressions - 3 day look back

## 2024-02-24 NOTE — ASSESSMENT & PLAN NOTE
History of Crohn's disease on Skyrizi as well as methotrexate  Solumedrol switched to PO prednisone 40 per GI  Slow taper per GI  Advance diet and observe for pain

## 2024-02-24 NOTE — PROGRESS NOTES
[facility]  Progress Note  Name: Celine Roa I  MRN: [id number]  Unit/Bed#: [place] [address] -01 I Date of Admission: 2/13/2024   Date of Service: 2/24/2024 I Hospital Day: 11    Assessment/Plan   * Right upper quadrant abdominal pain  Assessment & Plan  Persistent RUQ pain worse with regular food intake, better while on clears  S/p ERCP and sphincterotomy  MRCP with stable findings  Possible biliary dyskinesia with known sphincter of Oddi dysfunction  Possible uncontrolled GERD while off protonix per GI  Protonix restarted  Advance diet and observe for recurrent pain   Discussed with Dr. Cavazos    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HCC)  Assessment & Plan  Corn allergy noted   Multiple drug allergies noted but tolerated zosyn and unasyn  She tolerated lovenox and bentyl which were added recently    GERD without esophagitis  Assessment & Plan  Stable. Continue Pepcid 40 ng BID  Protonix restarted    Crohn's colitis (HCC)  Assessment & Plan  History of Crohn's disease on Skyrizi as well as methotrexate  Solumedrol switched to PO prednisone 40 per GI  Slow taper per GI  Advance diet and observe for pain    Acquired hypothyroidism  Assessment & Plan  Continue cytomel daily           VTE Pharmacologic Prophylaxis: VTE Score: 3 Moderate Risk (Score 3-4) - Pharmacological DVT Prophylaxis Ordered: enoxaparin (Lovenox).        Patient Centered Rounds: I performed bedside rounds with nursing staff today.   Discussions with Specialists or Other Care Team Provider: PADILLA cavazos  Education and Discussions with Family / Patient: Patient declined call to .         Current Length of Stay: 11 day(s)  Current Patient Status: Inpatient   Certification Statement: The patient will continue to require additional inpatient hospital stay due to pain  Discharge Plan: Anticipate discharge later today or tomorrow to home.    Code Status: Level 1 - Full Code    Subjective:   She had pain again last night  after solids  Pain is ok on clears  She was off protonix and maybe this was causing her pain this time      Objective:     Vitals:   Temp (24hrs), Av.9 °F (37.2 °C), Min:98.8 °F (37.1 °C), Max:99.2 °F (37.3 °C)    Temp:  [98.8 °F (37.1 °C)-99.2 °F (37.3 °C)] 98.8 °F (37.1 °C)  HR:  [63-90] 70  Resp:  [17-20] 17  BP: (121-122)/(79-84) 122/79  SpO2:  [93 %-95 %] 95 %  Body mass index is 27.39 kg/m².     Input and Output Summary (last 24 hours):   No intake or output data in the 24 hours ending 24 1123    Physical Exam:   Physical Exam  Vitals reviewed.   Constitutional:       Appearance: She is not ill-appearing.   HENT:      Head: Normocephalic and atraumatic.      Nose: No congestion or rhinorrhea.   Eyes:      General: No scleral icterus.  Cardiovascular:      Rate and Rhythm: Normal rate and regular rhythm.   Pulmonary:      Breath sounds: No wheezing or rhonchi.   Abdominal:      Palpations: Abdomen is soft.      Tenderness: There is abdominal tenderness.   Musculoskeletal:      Right lower leg: No edema.      Left lower leg: No edema.   Skin:     General: Skin is warm and dry.   Neurological:      Mental Status: She is oriented to person, place, and time.   Psychiatric:         Mood and Affect: Mood normal.         Behavior: Behavior normal.       Additional Data:     Labs:  Results from last 7 days   Lab Units 24  0451   WBC Thousand/uL 15.24*   HEMOGLOBIN g/dL 14.0   HEMATOCRIT % 42.1   PLATELETS Thousands/uL 483*   NEUTROS PCT % 77*   LYMPHS PCT % 13*   MONOS PCT % 8   EOS PCT % 0     Results from last 7 days   Lab Units 24  0436 24  0451   SODIUM mmol/L  --  137   POTASSIUM mmol/L  --  3.8   CHLORIDE mmol/L  --  102   CO2 mmol/L  --  26   BUN mg/dL  --  11   CREATININE mg/dL  --  0.61   ANION GAP mmol/L  --  9   CALCIUM mg/dL  --  8.8   ALBUMIN g/dL 3.4*  --    TOTAL BILIRUBIN mg/dL 0.71  --    ALK PHOS U/L 117*  --    ALT U/L 91*  --    AST U/L 38  --    GLUCOSE RANDOM mg/dL  --   93                       Lines/Drains:  Invasive Devices       Peripheral Intravenous Line  Duration             Peripheral IV 02/23/24 Right;Ventral (anterior) Forearm 1 day              Line  Duration             Pump Device  -- days                          Imaging: Reviewed radiology reports from this admission including: abdominal/pelvic CT and MRI abdomen/MRCP    Recent Cultures (last 7 days):         Last 24 Hours Medication List:   Current Facility-Administered Medications   Medication Dose Route Frequency Provider Last Rate    acetaminophen  650 mg Oral Q8H [facility] Eagle Babin MD      bisacodyl  10 mg Oral Daily Shady Montano MD      butalbital-acetaminophen-caffeine  1 tablet Oral Q4H PRN Miles Holm, DO      dicyclomine  20 mg Oral 4x Daily (AC & HS) Vipul Partida MD      diphenhydrAMINE  50 mg Intravenous Q4H PRN Miles Holm, DO      enoxaparin  40 mg Subcutaneous Q24H [facility] Vipul Partida MD      famotidine  40 mg Oral BID Eagle Babin MD      folic acid  1 mg Oral Daily Miles Holm, DO      hydrOXYzine HCL  15 mg Oral Daily Miles Holm, DO      levothyroxine  75 mcg Oral Every Other Day Miles Holm, DO      liothyronine  5 mcg Oral Daily Miles Holm, DO      loratadine  10 mg Oral Daily Miles Holm, DO      mirtazapine  15 mg Oral HS Miles Holm, DO      montelukast  10 mg Oral HS Miles Holm, DO      ondansetron  4 mg Intravenous Q6H PRN Miles Holm, DO      oxybutynin  5 mg Oral BID PRN Miles Holm, DO      pantoprazole  40 mg Oral Daily Before Breakfast Hair Noonan MD      predniSONE  40 mg Oral Daily Shady Montano MD      traMADol  50 mg Oral Q6H PRN Vipul Partida MD      trimethobenzamide  200 mg Intramuscular Q6H PRN Tommy Rapp PA-C          Today, Patient Was Seen By: Vipul Partida MD    **Please Note: This note may have been constructed using a voice recognition  system.**       4 = No assist / stand by assistance

## 2024-02-24 NOTE — PLAN OF CARE
Problem: PAIN - ADULT  Goal: Verbalizes/displays adequate comfort level or baseline comfort level  Description: Interventions:  - Encourage patient to monitor pain and request assistance  - Assess pain using appropriate pain scale  - Administer analgesics based on type and severity of pain and evaluate response  - Implement non-pharmacological measures as appropriate and evaluate response  - Consider cultural and social influences on pain and pain management  - Notify physician/advanced practitioner if interventions unsuccessful or patient reports new pain  Outcome: Progressing     Problem: Knowledge Deficit  Goal: Patient/family/caregiver demonstrates understanding of disease process, treatment plan, medications, and discharge instructions  Description: Complete learning assessment and assess knowledge base.  Interventions:  - Provide teaching at level of understanding  - Provide teaching via preferred learning methods  Outcome: Progressing     Problem: GASTROINTESTINAL - ADULT  Goal: Minimal or absence of nausea and/or vomiting  Description: INTERVENTIONS:  - Administer IV fluids if ordered to ensure adequate hydration  - Maintain NPO status until nausea and vomiting are resolved  - Nasogastric tube if ordered  - Administer ordered antiemetic medications as needed  - Provide nonpharmacologic comfort measures as appropriate  - Advance diet as tolerated, if ordered  - Consider nutrition services referral to assist patient with adequate nutrition and appropriate food choices  Outcome: Progressing  Goal: Maintains or returns to baseline bowel function  Description: INTERVENTIONS:  - Assess bowel function  - Encourage oral fluids to ensure adequate hydration  - Administer IV fluids if ordered to ensure adequate hydration  - Administer ordered medications as needed  - Encourage mobilization and activity  - Consider nutritional services referral to assist patient with adequate nutrition and appropriate food  choices  Outcome: Progressing  Goal: Maintains adequate nutritional intake  Description: INTERVENTIONS:  - Monitor percentage of each meal consumed  - Identify factors contributing to decreased intake, treat as appropriate  - Assist with meals as needed  - Monitor I&O, weight, and lab values if indicated  - Obtain nutrition services referral as needed  Outcome: Progressing     Problem: METABOLIC, FLUID AND ELECTROLYTES - ADULT  Goal: Fluid balance maintained  Description: INTERVENTIONS:  - Monitor labs   - Monitor I/O and WT  - Instruct patient on fluid and nutrition as appropriate  - Assess for signs & symptoms of volume excess or deficit  Outcome: Progressing     Problem: DISCHARGE PLANNING  Goal: Discharge to home or other facility with appropriate resources  Description: INTERVENTIONS:  - Identify barriers to discharge w/patient and caregiver  - Arrange for needed discharge resources and transportation as appropriate  - Identify discharge learning needs (meds, wound care, etc.)  - Arrange for interpretive services to assist at discharge as needed  - Refer to Case Management Department for coordinating discharge planning if the patient needs post-hospital services based on physician/advanced practitioner order or complex needs related to functional status, cognitive ability, or social support system  Outcome: Progressing     Problem: Nutrition/Hydration-ADULT  Goal: Nutrient/Hydration intake appropriate for improving, restoring or maintaining nutritional needs  Description: Monitor and assess patient's nutrition/hydration status for malnutrition. Collaborate with interdisciplinary team and initiate plan and interventions as ordered.  Monitor patient's weight and dietary intake as ordered or per policy. Utilize nutrition screening tool and intervene as necessary. Determine patient's food preferences and provide high-protein, high-caloric foods as appropriate.     INTERVENTIONS:  - Monitor oral intake, urinary  output, labs, and treatment plans  - Assess nutrition and hydration status and recommend course of action  - Evaluate amount of meals eaten  - Assist patient with eating if necessary   - Allow adequate time for meals  - Recommend/ encourage appropriate diets, oral nutritional supplements, and vitamin/mineral supplements  - Order, calculate, and assess calorie counts as needed  - Recommend, monitor, and adjust tube feedings and TPN/PPN based on assessed needs  - Assess need for intravenous fluids  - Provide specific nutrition/hydration education as appropriate  - Include patient/family/caregiver in decisions related to nutrition  Outcome: Progressing

## 2024-02-25 PROCEDURE — 99232 SBSQ HOSP IP/OBS MODERATE 35: CPT | Performed by: PHYSICIAN ASSISTANT

## 2024-02-25 PROCEDURE — 99222 1ST HOSP IP/OBS MODERATE 55: CPT | Performed by: OBSTETRICS & GYNECOLOGY

## 2024-02-25 PROCEDURE — C9113 INJ PANTOPRAZOLE SODIUM, VIA: HCPCS | Performed by: INTERNAL MEDICINE

## 2024-02-25 RX ORDER — MOMETASONE FUROATE 1 MG/G
CREAM TOPICAL DAILY
Status: DISCONTINUED | OUTPATIENT
Start: 2024-02-25 | End: 2024-02-27 | Stop reason: SDUPTHER

## 2024-02-25 RX ORDER — HYDROMORPHONE HCL IN WATER/PF 6 MG/30 ML
0.2 PATIENT CONTROLLED ANALGESIA SYRINGE INTRAVENOUS EVERY 4 HOURS PRN
Status: DISCONTINUED | OUTPATIENT
Start: 2024-02-25 | End: 2024-03-07 | Stop reason: HOSPADM

## 2024-02-25 RX ORDER — METHYLPREDNISOLONE SODIUM SUCCINATE 40 MG/ML
40 INJECTION, POWDER, LYOPHILIZED, FOR SOLUTION INTRAMUSCULAR; INTRAVENOUS DAILY
Status: DISCONTINUED | OUTPATIENT
Start: 2024-02-25 | End: 2024-03-02

## 2024-02-25 RX ORDER — HYDROMORPHONE HCL/PF 1 MG/ML
0.5 SYRINGE (ML) INJECTION EVERY 4 HOURS PRN
Status: DISCONTINUED | OUTPATIENT
Start: 2024-02-25 | End: 2024-03-04

## 2024-02-25 RX ADMIN — HYDROXYZINE HYDROCHLORIDE 15 MG: 10 TABLET ORAL at 08:24

## 2024-02-25 RX ADMIN — MIRTAZAPINE 15 MG: 15 TABLET, FILM COATED ORAL at 21:42

## 2024-02-25 RX ADMIN — PANTOPRAZOLE SODIUM 40 MG: 40 INJECTION, POWDER, FOR SOLUTION INTRAVENOUS at 08:25

## 2024-02-25 RX ADMIN — DIPHENHYDRAMINE HYDROCHLORIDE 50 MG: 50 INJECTION, SOLUTION INTRAMUSCULAR; INTRAVENOUS at 15:20

## 2024-02-25 RX ADMIN — ENOXAPARIN SODIUM 40 MG: 40 INJECTION SUBCUTANEOUS at 08:25

## 2024-02-25 RX ADMIN — HYDROMORPHONE HYDROCHLORIDE 0.2 MG: 0.2 INJECTION, SOLUTION INTRAMUSCULAR; INTRAVENOUS; SUBCUTANEOUS at 07:01

## 2024-02-25 RX ADMIN — HYDROMORPHONE HYDROCHLORIDE 0.5 MG: 1 INJECTION, SOLUTION INTRAMUSCULAR; INTRAVENOUS; SUBCUTANEOUS at 21:42

## 2024-02-25 RX ADMIN — DIPHENHYDRAMINE HYDROCHLORIDE 50 MG: 50 INJECTION, SOLUTION INTRAMUSCULAR; INTRAVENOUS at 11:08

## 2024-02-25 RX ADMIN — SODIUM CHLORIDE, SODIUM GLUCONATE, SODIUM ACETATE, POTASSIUM CHLORIDE, MAGNESIUM CHLORIDE, SODIUM PHOSPHATE, DIBASIC, AND POTASSIUM PHOSPHATE 100 ML/HR: .53; .5; .37; .037; .03; .012; .00082 INJECTION, SOLUTION INTRAVENOUS at 17:28

## 2024-02-25 RX ADMIN — HYDROMORPHONE HYDROCHLORIDE 0.2 MG: 0.2 INJECTION, SOLUTION INTRAMUSCULAR; INTRAVENOUS; SUBCUTANEOUS at 00:11

## 2024-02-25 RX ADMIN — HYDROMORPHONE HYDROCHLORIDE 0.5 MG: 1 INJECTION, SOLUTION INTRAMUSCULAR; INTRAVENOUS; SUBCUTANEOUS at 17:28

## 2024-02-25 RX ADMIN — DIPHENHYDRAMINE HYDROCHLORIDE 50 MG: 50 INJECTION, SOLUTION INTRAMUSCULAR; INTRAVENOUS at 07:01

## 2024-02-25 RX ADMIN — HYDROMORPHONE HYDROCHLORIDE 0.2 MG: 0.2 INJECTION, SOLUTION INTRAMUSCULAR; INTRAVENOUS; SUBCUTANEOUS at 23:57

## 2024-02-25 RX ADMIN — DIPHENHYDRAMINE HYDROCHLORIDE 50 MG: 50 INJECTION, SOLUTION INTRAMUSCULAR; INTRAVENOUS at 02:38

## 2024-02-25 RX ADMIN — HYDROMORPHONE HYDROCHLORIDE 0.2 MG: 0.2 INJECTION, SOLUTION INTRAMUSCULAR; INTRAVENOUS; SUBCUTANEOUS at 14:24

## 2024-02-25 RX ADMIN — DIPHENHYDRAMINE HYDROCHLORIDE 50 MG: 50 INJECTION, SOLUTION INTRAMUSCULAR; INTRAVENOUS at 21:42

## 2024-02-25 RX ADMIN — METHYLPREDNISOLONE SODIUM SUCCINATE 40 MG: 40 INJECTION, POWDER, FOR SOLUTION INTRAMUSCULAR; INTRAVENOUS at 13:02

## 2024-02-25 RX ADMIN — HYDROMORPHONE HYDROCHLORIDE 0.5 MG: 1 INJECTION, SOLUTION INTRAMUSCULAR; INTRAVENOUS; SUBCUTANEOUS at 13:02

## 2024-02-25 RX ADMIN — MONTELUKAST 10 MG: 10 TABLET, FILM COATED ORAL at 21:42

## 2024-02-25 NOTE — CONSULTS
Consultation - Gynecology  Celine Roa 49 y.o. female MRN: [id number]  Unit/Bed#: [place] [address] -01 Encounter: [id number]      Inpatient consult to Obstetrics / Gynecology  Consult performed by: Shala Johnston MD  Consult ordered by: Hair Noonan MD            No chief complaint on file.      Assessment/Plan      Endometriosis  Assessment & Plan  S/p hysterectomy in 2007 in the setting of AUB/endometriosis   Reports chronic bilateral pelvic pain/dyspareunia   TVUS 1/2024 with bilateral multicystic cysts - Right 3.0 x 2.8 x 2.2 cm; Left 2.8 x 1.2 x 2.2 cm  Discussed with patient that while bilateral oophorectomy would be curative it would likely not be performed while inpatient and additional management option would be pharmaceutical management with either Depo-Lupron or Orilissa to initiate medical menopause.  We discussed that these medications could potentially start be started while inpatient which could then allow for bridging to menopause versus surgical management on an outpatient basis.  Celine expressed concern regarding her multiple allergies and potential ingredients/additives in the medication.  We will plan to discuss these medications with pharmacy and administer which ever is most compatible with her allergy list    * Right upper quadrant abdominal pain  Assessment & Plan  Reports right upper quadrant pain for 6 months, worse within the last month  The pain is exacerbated by eating/drinking and relieved by lying supine/heat application  Status post ERCP and sphincterotomy this admission by GI, GI continues to follow  Despite patient's history of pulmonary endometriosis, suspect that right upper quadrant pain is not secondary to endometriosis implants given dilated bile ducts noted on imaging. Definitive diagnosis of endometriosis cannot be made without laparoscopy however in the setting of other possible sources of RUQ pain  and likely signficant adhesive disease in the setting of prior  abdominal surgeries/endometriosis, do not recommend surgical exploration at this time.   We will trial Depo-Lupron or Orilissa (pending further discussion with pharmacy) for history of endometriosis, if resolution of right upper quadrant pain coincides with administration of medication, endometriosis may be contributory            History of Present Illness:  Physician Requesting Consult: Claudine Hobson MD  Reason for Consult / Principal Problem: RUQ pain/hx of endometriosis   HPI: Celine Roa is a 49 y.o.  female who presents with RUQ pain.  Celine reports a history of right upper quadrant pain that has lasted approximately 6 months.  She states that her pain has progressively gotten worse for the last month.  She states her pain is primarily located in the right upper quadrant and is made worse by eating or movement.  She states that if she wakes up and has an empty stomach her pain is less pronounced.  She states the pain is fairly localized and does not radiate much.  She does state heat provide some relief but this causes a flare in her mast cell disease.    Celine reports a significant history of endometriosis.  She is status post a hysterectomy in  for endometriosis/abnormal bleeding.  She endorses a history of pulmonary endometriosis prior to her hysterectomy.  Since her hysterectomy she continues to report lower abdominal pain.  She had a transvaginal ultrasound performed in 2024 which showed bilateral septated cysts.  She has been following with advanced gynecology with hopes of pursuing a bilateral oophorectomy for her endometriosis.    In addition to right upper quadrant pain and a significant history of endometriosis Celine also has a history of Crohn's disease.  She states that she was previously maintained on Skyrizi however this was stopped secondary to her corn allergy and the dextrose component of the injections.  Most recently she has been on methotrexate infusion which she last  received 3 weeks ago prior to admission.  She states that up until approximately 2 months ago she had not had a solid bowel movement in 7 months.    Celine also endorses a history of laparoscopic hematuria.  She is status post TURBT in 2023 with resection of superficial bladder tumor which she states was benign.  Since that time she endorses chronic bladder spasms and a sensation of incomplete emptying of her bladder.        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 pt denies, documented in cardio note    Pain of right thigh 02/13/2023    Pancreatitis     sphinger of     Psychiatric disorder     RA (rheumatoid arthritis) (HCC)     Seizures (HCC)     Sepsis without acute organ dysfunction (HCC)     SIRS (systemic inflammatory response syndrome) (HCC)     Thrombocytosis 04/05/2022    Ulcerative colitis (HCC)     Visual impairment      Past Surgical History:   Procedure Laterality Date    ABDOMINAL SURGERY  2017    APPENDECTOMY      CHOLECYSTECTOMY      COLONOSCOPY  2019    CYSTOSCOPY N/A 6/8/2023    Procedure: CYSTOSCOPY, mini TURBT with fulgeration;  Surgeon: Tee Bruno MD;  Location: [facility];  Service: Urology    EGD AND COLONOSCOPY N/A 09/12/2017    Procedure: EGD AND COLONOSCOPY;  Surgeon: Tommy Oliver MD;  [facility]: [facility];   Service: Gastroenterology    ERCP N/A 09/15/2017    Procedure: ENDOSCOPIC RETROGRADE CHOLANGIOPANCREATOGRAPHY (ERCP);  Surgeon: Dre Soto MD;  [facility];  Service: Gastroenterology    HYSTERECTOMY      KNEE SURGERY Right     LAPAROSCOPIC ENDOMETRIOSIS FULGURATION      ORIF ANKLE FRACTURE Left     CT ARTHRS KNE SURG W/MENISCECTOMY MED/LAT W/SHVG Left 2017    Procedure: POSSIBLE MEDIAL MENISECTOMY;  Surgeon: Kristian Avila MD;  Location: [facility];  Service: Orthopedics    CT ARTHRS KNEE DEBRIDEMENT/SHAVING ARTCLR CRTLG Left 2017    Procedure: KNEE ARTHROSCOPY EVALUATION, CHONDROPLASTY;  Surgeon: Kristian Avila MD;  Location: [facility];  Service: Orthopedics    CT LAPS ABD PRTM&OMENTUM DX W/WO SPEC BR/WA SPX N/A 2017    Procedure: LAPAROSCOPY DIAGNOSTIC  WITH LYSIS OF ADHESIONS;  Surgeon: Olaf John DO;  [facility];  Service: General    UPPER GASTROINTESTINAL ENDOSCOPY       OB History    Para Term  AB Living   3 3 3         SAB IAB Ectopic Multiple Live Births                  # Outcome Date GA Lbr Nic/2nd Weight Sex Delivery Anes PTL Lv   3 Term            2 Term            1 Term              Family History   Problem Relation Age of Onset    Ulcerative colitis Mother     Arthritis Mother     Colon polyps Mother     Heart failure Father     Neuropathy Father     Macular degeneration Father     Diabetes Father     Early death Father     Vision loss Father     Asthma Daughter     Hypothyroidism Daughter     Heart disease Maternal Grandmother     Arthritis Maternal Grandmother     No Known Problems Maternal Grandfather     Arthritis Paternal Grandmother     Macular degeneration Paternal Grandmother     Vision loss Paternal Grandmother     Lymphoma Paternal Grandfather     Cancer Paternal Grandfather     Early death Paternal Grandfather     Breast cancer Maternal Aunt     Cancer Maternal Aunt     Miscarriages / Stillbirths Maternal Aunt     Heart failure  Paternal Aunt     Heart failure Paternal Aunt     Irritable bowel syndrome Paternal Aunt     Breast cancer Paternal Aunt 54    Breast cancer additional onset Paternal Aunt 58    Hypothyroidism Paternal Aunt     Cancer Paternal Aunt     No Known Problems Son     No Known Problems Son     Kidney disease Brother     Depression Paternal Uncle     Early death Paternal Uncle      Social History   Social History     Substance and Sexual Activity   Alcohol Use Never     Social History     Substance and Sexual Activity   Drug Use Never     Social History     Tobacco Use   Smoking Status Never   Smokeless Tobacco Never     Allergies   Allergen Reactions    Aimovig [Erenumab-Aooe] Anaphylaxis    Amitriptyline Anaphylaxis     According to the patient she had nphylaxis    Aspergillus Allergy Skin Test Other (See Comments), Hives and Swelling    Azathioprine Other (See Comments) and Anaphylaxis     pancreatitis  According to patient she needed Epipen    Buspar [Buspirone] Hives and Shortness Of Breath    Citric Acid-Polysorbate 80 Abdominal Pain, Anaphylaxis, Anxiety, Bradycardia, Diarrhea, Fatigue, GI Intolerance, Headache, Hives, Hyperactivity, Itching, Lip Swelling, Nausea Only, Palpitations, Rash, Shortness Of Breath, Tachycardia, Throat Swelling, Tongue Swelling and Vomiting    Corn Dextrin Anaphylaxis     Any corn based products    Erenumab Anaphylaxis     patient sent portal message stating she had anaphylaxis  patient sent portal message stating she had anaphylaxis      Gadolinium Abdominal Pain, Anaphylaxis, Anxiety, Confusion, Delirium, Dizziness, Eye Swelling, Fatigue, GI Intolerance, Headache, Hives, Irritability, Itching, Lip Swelling, Nausea Only, Palpitations, Photosensitivity, Rash, Seizures, Shortness Of Breath, Swelling, Syncope, Throat Swelling, Tongue Swelling, Tremor, Visual Disturbance and Vomiting    Gelatin - Food Allergy Anaphylaxis    Heparin Hives     Painful welts     Lavender Oil Anaphylaxis     Other  "reaction(s): anaphalxis    Malto Dextrin [Dextrin] Anaphylaxis    Other Anaphylaxis     Gel caps, maltodextrose, dextrose,grapes, lavender     Penicillium Notatum Shortness Of Breath and Swelling    Red Dye - Food Allergy Anaphylaxis and Other (See Comments)     intolerance    Sumatriptan Anaphylaxis     Bradycardia, sob, back pressure, head pain,throat tightness  According to the patient she had anphylaxis    Ustekinumab Anaphylaxis    Lidocaine Hives, Itching and Rash    Contrast [Iodinated Contrast Media] Other (See Comments)     Pt states needs to be premedicated prior to injection    Corn Oil - Food Allergy - Food Allergy Hives    Humira [Adalimumab] Other (See Comments)     \"I develop drug induced lupus\"    Ibuprofen Hives and Throat Swelling    Polyethylene Glycol Diarrhea and Vomiting    Remicade [Infliximab] Other (See Comments)     \"I develop drug induced lupus\"    Sulfa Antibiotics Hives and Other (See Comments)    Sulfate Hives    Sulfites - Food Allergy Hives    Sweet Corn Extract Skin Test - Food Allergy Hives and Itching    Chlorhexidine Itching    Freederm Adhesive Remover [New Skin] Rash    Histamine Hives, Rash and Other (See Comments)     Intolerance      Wound Dressings Rash       Objective   Vitals: Blood pressure 113/77, pulse 81, temperature 98.7 °F (37.1 °C), resp. rate 16, weight 74.3 kg (163 lb 12.8 oz), SpO2 93%, not currently breastfeeding. Body mass index is 27.06 kg/m².    Invasive Devices       Peripheral Intravenous Line  Duration             Peripheral IV 02/23/24 Right;Ventral (anterior) Forearm 2 days              Line  Duration             Pump Device  -- days                    Physical Exam  Constitutional:       General: She is not in acute distress.     Appearance: Normal appearance.   HENT:      Head: Normocephalic and atraumatic.   Cardiovascular:      Rate and Rhythm: Normal rate.   Pulmonary:      Effort: Pulmonary effort is normal.   Abdominal:      Palpations: Abdomen is " soft.      Tenderness: There is abdominal tenderness in the right upper quadrant, right lower quadrant, suprapubic area and left lower quadrant. There is no guarding or rebound.      Comments: Diffuse tenderness to palpation throughout the abdomen worst in the right upper quadrant and suprapubic area   Genitourinary:     Labia:         Right: No rash, tenderness or lesion.         Left: No rash, tenderness or lesion.       Vagina: No signs of injury. No vaginal discharge, bleeding or lesions.      Uterus: Absent.       Adnexa:         Right: Tenderness present. No mass or fullness.          Left: Tenderness present. No mass or fullness.        Comments: Uterus and cervix surgically absent  Diffuse tenderness on bimanual exam worse in the suprapubic region  Skin:     General: Skin is warm and dry.   Neurological:      General: No focal deficit present.      Mental Status: She is alert.   Psychiatric:         Mood and Affect: Mood normal.         Lab Results: No results found for this or any previous visit (from the past 24 hour(s)).    Shala Johnston MD  2/25/2024  4:51 PM

## 2024-02-25 NOTE — ASSESSMENT & PLAN NOTE
Reports decreased urinary output and having to push to urinate  Bladder scans within normal limits, no evidence of hydroureteronephrosis on ultrasound  Valuated by urology at [facility] and placed on Ditropan  Will need outpatient urodynamic studies as well as discussion on further medical management

## 2024-02-25 NOTE — CASE MANAGEMENT
Case Management Discharge Planning Note    Patient name Celine Roa  Location [facility] M* MRN [id number]  : 1975 Date 2024       Current Admission Date: 2024  Current Admission Diagnosis:Right upper quadrant abdominal pain   Patient Active Problem List    Diagnosis Date Noted    Oral phase dysphagia 2024    Pancreatitis 2024    Pollen-food allergy 2024    Oral allergy syndrome 2024    History of peripheral edema 2024    Hepatitis B antibody positive 2024    H/o Lyme disease 2024    Genetic disorder 2024    Endometriosis 2024    Anxiety 2024    Weight disorder 2024    Hepatitis 2024    Bladder spasm 02/10/2024    Intractable right upper quadrant abdominal pain 2024    Common variable immunodeficiency (HCC) 2024    Iron deficiency 2023    Iron deficiency anemia due to chronic blood loss 2023    Depression due to physical illness 2023    Circadian rhythm sleep disorder 2023    Insomnia due to medical condition 2023    Recurrent major depressive disorder, in partial remission ([facility]) 2023    Medical clearance for psychiatric admission 2023    Seasonal allergies 2023    GERD without esophagitis 2023    Major depressive disorder with psychotic features (HCC) 2023    Benzodiazepine misuse 2023    Passive suicidal ideations 2023    Encephalopathy chronic 2023    Systemic lupus erythematosus, unspecified SLE type, unspecified organ involvement status (HCC) 2023    Bladder tumor 2023    Seizure ([facility])     Allergic reaction 2023    Idiopathic anaphylaxis 2023    Immunosuppression due to chronic steroid use  2022    Intractable migraine without status migrainosus 2022    Multiple allergies 2022    Crohn disease (HCC) 2022    Traumatic brain injury (HCC) 2022    Osteopenia 2022     Immunocompromised (HCC) 04/09/2022    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    Hiatal hernia 04/09/2022    Ulcerative colitis (HCC) 04/05/2022    Small fiber neuropathy 04/05/2022    Immunosuppression ([facility]) 04/05/2022    Atrial tachycardia 02/24/2022    Nephrolithiasis 12/06/2021    Anaphylactic reaction 12/04/2021    Intractable nausea and vomiting 12/02/2021    Heart palpitations 03/26/2021    Inflammatory bowel disease 03/07/2021    Current chronic use of systemic steroids 01/28/2021    MS (multiple sclerosis) ([facility]) 01/28/2021    Migraine     Overweight (BMI 25.0-29.9) 01/20/2021    Anorexia 10/17/2020    Crohn's colitis ([facility]) 10/17/2020    Hashimoto's disease 10/05/2020    Mild protein-calorie malnutrition ([facility]) 09/12/2019    Constipation 09/10/2019    Mass of left axilla 08/30/2019    Proctitis 08/26/2019    Essential (hemorrhagic) thrombocythemia ([facility])     Thrush 02/12/2019    Chronic back pain 02/12/2019    Primary localized osteoarthrosis of ankle and foot 06/04/2018    Fibromyalgia 12/28/2017    Meniere's disease 11/29/2017    Leukocytosis 09/10/2017    Hypomagnesemia 08/26/2017    Generalized anxiety disorder 08/26/2017    Vomiting and diarrhea 08/25/2017    Vitamin D deficiency 08/25/2017    Throat tightness 08/25/2017    Right upper quadrant abdominal pain 08/25/2017    Disorder of synovium 06/23/2017    Chondromalacia 05/12/2017    Chronic idiopathic urticaria 05/02/2017    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HCC) 05/19/2016    Acquired hypothyroidism 01/13/2014      LOS (days): 12  Geometric Mean LOS (GMLOS) (days): 4.4  Days to GMLOS:-7.6     OBJECTIVE:  Risk of Unplanned Readmission Score: 32.29         Current admission status: Inpatient   Preferred Pharmacy:   [facility] - [place] [facility] - [address]  [address]  [place] [address]  Phone: [phone] Fax: [phone]    [facility] - [place] - [address]  [place]  [place] [address]  Phone: [phone] Fax: [phone]    [address] [address]  Phone: [phone] Fax: [phone]    Primary Care Provider: Olaf Rhodes MD    Primary Insurance: [facility]  Secondary Insurance: MEDICARE    DISCHARGE DETAILS:                                                                                        IMM Given (Date):: 02/25/24  IMM Given to:: Patient     Additional Comments: IMM reviewed with pt and pt's  at bedside, pt expressed understanding of her right to appeal her discharge, and pt signed the IMM.

## 2024-02-25 NOTE — PROGRESS NOTES
[facility]  Progress Note  Name: Celine Roa I  MRN: [id number]  Unit/Bed#: [place] [address] -01 I Date of Admission: 2/13/2024   Date of Service: 2/25/2024 I Hospital Day: 12    Assessment/Plan   * Right upper quadrant abdominal pain  Assessment & Plan  Persistent RUQ pain worse with regular food intake, better while on clears  S/p ERCP and sphincterotomy  MRCP with stable findings  Possible biliary dyskinesia with known sphincter of Oddi dysfunction  Possible uncontrolled GERD while off protonix per GI  Protonix restarted  Patient notes she did not tolerate solid diet, transitioned back to liquids  Started on MiraLAX by gastroenterology, patient notes inability to tolerate MiraLAX due to corn allergy, does report she had multiple bowel movements over the past few days with Dulcolax  GYN consult placed as patient notes this is similar to her prior episodes of endometriosis    Bladder spasm  Assessment & Plan  Reports decreased urinary output and having to push to urinate  Bladder scans within normal limits, no evidence of hydroureteronephrosis on ultrasound  Valuated by urology at [facility] and placed on Ditropan  Will need outpatient urodynamic studies as well as discussion on further medical management    GERD without esophagitis  Assessment & Plan  Stable.   Protonix restarted    Crohn's colitis (HCC)  Assessment & Plan  History of Crohn's disease on Skyrizi as well as methotrexate  Solumedrol switched to PO prednisone 40 per GI but patient unable to tolerate prednisone tablet   Slow taper per GI  Diet switched back to liquids last night     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HCC)  Assessment & Plan  Corn allergy noted   Multiple drug allergies noted but tolerated zosyn and unasyn  She tolerated lovenox and bentyl which were added recently  Continue PRN benadryl and start topical steroid for rash             VTE Pharmacologic Prophylaxis: VTE Score: 3 Moderate Risk (Score 3-4) -  Pharmacological DVT Prophylaxis Ordered: enoxaparin (Lovenox).    Mobility:   Basic Mobility Inpatient Raw Score: 24  JH-HLM Goal: 8: Walk 250 feet or more  JH-HLM Achieved: 7: Walk 25 feet or more  HLM Goal NOT achieved. Continue with multidisciplinary rounding and encourage appropriate mobility to improve upon HLM goals.    Patient Centered Rounds: I performed bedside rounds with nursing staff today.   Discussions with Specialists or Other Care Team Provider: GI GYN    Education and Discussions with Family / Patient: Updated  (significant other) at bedside.    Total Time Spent on Date of Encounter in care of patient: This time was spent on one or more of the following: performing physical exam; counseling and coordination of care; obtaining or reviewing history; documenting in the medical record; reviewing/ordering tests, medications or procedures; communicating with other healthcare professionals and discussing with patient's family/caregivers.    Current Length of Stay: 12 day(s)  Current Patient Status: Inpatient   Certification Statement: The patient will continue to require additional inpatient hospital stay due to intractable abdominal pain and inability to tolerate oral intake  Discharge Plan: Anticipate discharge in 24-48 hrs to home.    Code Status: Level 1 - Full Code    Subjective:   Patient reports she is doing poorly today.  Was unable to get much sleep due to abdominal pain.  Notes her pain is not well-controlled with current regimen.  Does report worsening in her chest rash which she attributes to corn allergy.  Unable to tolerate solid diet.  Was able to tolerate some liquids this morning.  Does report she was unable to take her prednisone, Carafate and MiraLAX due to mixture with corn.    Objective:     Vitals:   Temp (24hrs), Av °F (37.2 °C), Min:98.7 °F (37.1 °C), Max:99.3 °F (37.4 °C)    Temp:  [98.7 °F (37.1 °C)-99.3 °F (37.4 °C)] 98.7 °F (37.1 °C)  HR:  [62-88] 62  Resp:   [16-18] 17  BP: (108-117)/(66-81) 108/66  SpO2:  [95 %-98 %] 96 %  Body mass index is 27.06 kg/m².     Input and Output Summary (last 24 hours):   No intake or output data in the 24 hours ending 02/25/24 1334    Physical Exam:   Physical Exam  Vitals reviewed.   Constitutional:       General: She is not in acute distress.  HENT:      Head: Normocephalic and atraumatic.   Eyes:      General: No scleral icterus.     Conjunctiva/sclera: Conjunctivae normal.   Cardiovascular:      Rate and Rhythm: Normal rate and regular rhythm.      Heart sounds: No murmur heard.  Pulmonary:      Effort: Pulmonary effort is normal. No respiratory distress.      Breath sounds: Normal breath sounds.   Abdominal:      General: Bowel sounds are normal. There is no distension.      Palpations: Abdomen is soft.      Tenderness: There is abdominal tenderness.   Musculoskeletal:      Cervical back: Neck supple.      Right lower leg: No edema.      Left lower leg: No edema.   Skin:     General: Skin is warm and dry.      Findings: Rash (chest) present.   Neurological:      Mental Status: She is alert and oriented to person, place, and time.   Psychiatric:         Mood and Affect: Mood normal.         Behavior: Behavior normal.          Additional Data:     Labs:  Results from last 7 days   Lab Units 02/23/24  0451   WBC Thousand/uL 15.24*   HEMOGLOBIN g/dL 14.0   HEMATOCRIT % 42.1   PLATELETS Thousands/uL 483*   NEUTROS PCT % 77*   LYMPHS PCT % 13*   MONOS PCT % 8   EOS PCT % 0     Results from last 7 days   Lab Units 02/24/24  0436 02/23/24  0451   SODIUM mmol/L  --  137   POTASSIUM mmol/L  --  3.8   CHLORIDE mmol/L  --  102   CO2 mmol/L  --  26   BUN mg/dL  --  11   CREATININE mg/dL  --  0.61   ANION GAP mmol/L  --  9   CALCIUM mg/dL  --  8.8   ALBUMIN g/dL 3.4*  --    TOTAL BILIRUBIN mg/dL 0.71  --    ALK PHOS U/L 117*  --    ALT U/L 91*  --    AST U/L 38  --    GLUCOSE RANDOM mg/dL  --  93                       Lines/Drains:  Invasive  Devices       Peripheral Intravenous Line  Duration             Peripheral IV 02/23/24 Right;Ventral (anterior) Forearm 2 days              Line  Duration             Pump Device  -- days                          Imaging: No pertinent imaging reviewed.    Recent Cultures (last 7 days):         Last 24 Hours Medication List:   Current Facility-Administered Medications   Medication Dose Route Frequency Provider Last Rate    acetaminophen  650 mg Oral Q8H KARISSA Babin MD      bisacodyl  5 mg Oral Daily PRN Hair Noonan MD      butalbital-acetaminophen-caffeine  1 tablet Oral Q4H PRN Miles Holm, DO      dicyclomine  20 mg Oral TID PRN Hair Noonan MD      diphenhydrAMINE  50 mg Intravenous Q4H PRN Miles Holm, DO      enoxaparin  40 mg Subcutaneous Q24H [facility] Vipul Partida MD      HYDROmorphone  0.5 mg Intravenous Q4H PRN Merry Rodriguez PA-C      HYDROmorphone  0.2 mg Intravenous Q4H PRN Merry Rodriguez PA-C      hydrOXYzine HCL  15 mg Oral Daily Miles Holm, DO      lidocaine  1 patch Topical Daily Hair Noonan MD      methylPREDNISolone sodium succinate  40 mg Intravenous Daily Merry Rodriguez PA-C      mirtazapine  15 mg Oral HS Miles Holm, DO      mometasone   Topical Daily Merry Rodriguez PA-C      montelukast  10 mg Oral HS Miles Holm, DO      multi-electrolyte  100 mL/hr Intravenous Continuous Vipul Partida  mL/hr (02/24/24 1821)    ondansetron  4 mg Intravenous Q6H PRN Miles Holm, DO      oxybutynin  5 mg Oral BID PRN Miles Holm, DO      pantoprazole  40 mg Intravenous Q24H [facility] Vipul Partida MD      polyethylene glycol  17 g Oral TID Samuel Edwards MD      sucralfate  1 g Oral 4x Daily (AC & HS) Hair Noonan MD      trimethobenzamide  200 mg Intramuscular Q6H PRN Tommy Rapp PA-C          Today, Patient Was Seen By: Merry Rodriguez PA-C    **Please Note: This note may have been  constructed using a voice recognition system.**

## 2024-02-25 NOTE — ASSESSMENT & PLAN NOTE
S/p hysterectomy in 2007 in the setting of AUB/endometriosis   Reports chronic bilateral pelvic pain/dyspareunia   TVUS 1/2024 with bilateral multicystic cysts - Right 3.0 x 2.8 x 2.2 cm; Left 2.8 x 1.2 x 2.2 cm  Discussed with patient that while bilateral oophorectomy would be curative it would likely not be performed while inpatient and additional management option would be pharmaceutical management with either Depo-Lupron or Orilissa to initiate medical menopause.  We discussed that these medications could potentially start be started while inpatient which could then allow for bridging to menopause versus surgical management on an outpatient basis.  Celine expressed concern regarding her multiple allergies and potential ingredients/additives in the medication.  We will plan to discuss these medications with pharmacy and administer which ever is most compatible with her allergy list

## 2024-02-25 NOTE — ASSESSMENT & PLAN NOTE
History of Crohn's disease on Skyrizi as well as methotrexate  Solumedrol switched to PO prednisone 40 per GI but patient unable to tolerate prednisone tablet   Slow taper per GI  Diet switched back to liquids last night

## 2024-02-25 NOTE — ASSESSMENT & PLAN NOTE
Persistent RUQ pain worse with regular food intake, better while on clears  S/p ERCP and sphincterotomy  MRCP with stable findings  Possible biliary dyskinesia with known sphincter of Oddi dysfunction  Possible uncontrolled GERD while off protonix per GI  Protonix restarted  Patient notes she did not tolerate solid diet, transitioned back to liquids  Started on MiraLAX by gastroenterology, patient notes inability to tolerate MiraLAX due to corn allergy, does report she had multiple bowel movements over the past few days with Dulcolax  GYN consult placed as patient notes this is similar to her prior episodes of endometriosis

## 2024-02-25 NOTE — ASSESSMENT & PLAN NOTE
Corn allergy noted   Multiple drug allergies noted but tolerated zosyn and unasyn  She tolerated lovenox and bentyl which were added recently  Continue PRN benadryl and start topical steroid for rash

## 2024-02-25 NOTE — ASSESSMENT & PLAN NOTE
Reports right upper quadrant pain for 6 months, worse within the last month  The pain is exacerbated by eating/drinking and relieved by lying supine/heat application  Status post ERCP and sphincterotomy this admission by GI, GI continues to follow  Despite patient's history of pulmonary endometriosis, suspect that right upper quadrant pain is not secondary to endometriosis implants given dilated bile ducts noted on imaging. Definitive diagnosis of endometriosis cannot be made without laparoscopy however in the setting of other possible sources of RUQ pain  and likely signficant adhesive disease in the setting of prior abdominal surgeries/endometriosis, do not recommend surgical exploration at this time.   We will trial Depo-Lupron or Orilissa (pending further discussion with pharmacy) for history of endometriosis, if resolution of right upper quadrant pain coincides with administration of medication, endometriosis may be contributory

## 2024-02-26 DIAGNOSIS — Z78.0 MENOPAUSE: Primary | ICD-10-CM

## 2024-02-26 PROCEDURE — 99232 SBSQ HOSP IP/OBS MODERATE 35: CPT | Performed by: INTERNAL MEDICINE

## 2024-02-26 PROCEDURE — C9113 INJ PANTOPRAZOLE SODIUM, VIA: HCPCS | Performed by: INTERNAL MEDICINE

## 2024-02-26 RX ORDER — LEUPROLIDE ACETATE 1 MG/0.2ML
1 KIT SUBCUTANEOUS DAILY
Qty: 60 EACH | Refills: 0 | Status: SHIPPED | OUTPATIENT
Start: 2024-02-26 | End: 2024-04-26

## 2024-02-26 RX ADMIN — MIRTAZAPINE 15 MG: 15 TABLET, FILM COATED ORAL at 22:23

## 2024-02-26 RX ADMIN — HYDROXYZINE HYDROCHLORIDE 15 MG: 10 TABLET ORAL at 08:12

## 2024-02-26 RX ADMIN — HYDROMORPHONE HYDROCHLORIDE 0.2 MG: 0.2 INJECTION, SOLUTION INTRAMUSCULAR; INTRAVENOUS; SUBCUTANEOUS at 08:13

## 2024-02-26 RX ADMIN — DIPHENHYDRAMINE HYDROCHLORIDE 50 MG: 50 INJECTION, SOLUTION INTRAMUSCULAR; INTRAVENOUS at 02:30

## 2024-02-26 RX ADMIN — SODIUM CHLORIDE, SODIUM GLUCONATE, SODIUM ACETATE, POTASSIUM CHLORIDE, MAGNESIUM CHLORIDE, SODIUM PHOSPHATE, DIBASIC, AND POTASSIUM PHOSPHATE 100 ML/HR: .53; .5; .37; .037; .03; .012; .00082 INJECTION, SOLUTION INTRAVENOUS at 13:34

## 2024-02-26 RX ADMIN — HYDROMORPHONE HYDROCHLORIDE 0.5 MG: 1 INJECTION, SOLUTION INTRAMUSCULAR; INTRAVENOUS; SUBCUTANEOUS at 10:48

## 2024-02-26 RX ADMIN — HYDROMORPHONE HYDROCHLORIDE 0.5 MG: 1 INJECTION, SOLUTION INTRAMUSCULAR; INTRAVENOUS; SUBCUTANEOUS at 02:30

## 2024-02-26 RX ADMIN — DIPHENHYDRAMINE HYDROCHLORIDE 50 MG: 50 INJECTION, SOLUTION INTRAMUSCULAR; INTRAVENOUS at 17:27

## 2024-02-26 RX ADMIN — PANTOPRAZOLE SODIUM 40 MG: 40 INJECTION, POWDER, FOR SOLUTION INTRAVENOUS at 08:13

## 2024-02-26 RX ADMIN — ENOXAPARIN SODIUM 40 MG: 40 INJECTION SUBCUTANEOUS at 08:13

## 2024-02-26 RX ADMIN — MONTELUKAST 10 MG: 10 TABLET, FILM COATED ORAL at 22:23

## 2024-02-26 RX ADMIN — DIPHENHYDRAMINE HYDROCHLORIDE 50 MG: 50 INJECTION, SOLUTION INTRAMUSCULAR; INTRAVENOUS at 22:22

## 2024-02-26 RX ADMIN — METHYLPREDNISOLONE SODIUM SUCCINATE 40 MG: 40 INJECTION, POWDER, FOR SOLUTION INTRAMUSCULAR; INTRAVENOUS at 08:13

## 2024-02-26 RX ADMIN — HYDROMORPHONE HYDROCHLORIDE 0.2 MG: 0.2 INJECTION, SOLUTION INTRAMUSCULAR; INTRAVENOUS; SUBCUTANEOUS at 22:23

## 2024-02-26 RX ADMIN — HYDROMORPHONE HYDROCHLORIDE 0.2 MG: 0.2 INJECTION, SOLUTION INTRAMUSCULAR; INTRAVENOUS; SUBCUTANEOUS at 13:24

## 2024-02-26 RX ADMIN — SODIUM CHLORIDE, SODIUM GLUCONATE, SODIUM ACETATE, POTASSIUM CHLORIDE, MAGNESIUM CHLORIDE, SODIUM PHOSPHATE, DIBASIC, AND POTASSIUM PHOSPHATE 100 ML/HR: .53; .5; .37; .037; .03; .012; .00082 INJECTION, SOLUTION INTRAVENOUS at 22:28

## 2024-02-26 RX ADMIN — HYDROMORPHONE HYDROCHLORIDE 0.5 MG: 1 INJECTION, SOLUTION INTRAMUSCULAR; INTRAVENOUS; SUBCUTANEOUS at 14:52

## 2024-02-26 RX ADMIN — HYDROMORPHONE HYDROCHLORIDE 0.5 MG: 1 INJECTION, SOLUTION INTRAMUSCULAR; INTRAVENOUS; SUBCUTANEOUS at 19:53

## 2024-02-26 RX ADMIN — DIPHENHYDRAMINE HYDROCHLORIDE 50 MG: 50 INJECTION, SOLUTION INTRAMUSCULAR; INTRAVENOUS at 08:20

## 2024-02-26 RX ADMIN — HYDROMORPHONE HYDROCHLORIDE 0.2 MG: 0.2 INJECTION, SOLUTION INTRAMUSCULAR; INTRAVENOUS; SUBCUTANEOUS at 17:27

## 2024-02-26 RX ADMIN — DIPHENHYDRAMINE HYDROCHLORIDE 50 MG: 50 INJECTION, SOLUTION INTRAMUSCULAR; INTRAVENOUS at 12:29

## 2024-02-26 NOTE — PROGRESS NOTES
Reviewed admission data. Patient is 48 yo s/p TLH by Dr Sandoval in 2007 secondary to AUB/ endometriosis. Last seen in office in 6/22. Present now with c/p RUQ pain. Patient would like BSO.   US 1/24 simple cysts rt and lt ovary approximately 3 cm; CT scan pelvis 2/24 negative for adnexal pathology    I do not feel that her pain is related to endometriosis, based on HPI, age, and adnexal appearance on imaging. Recommend FSH LH estradiol to confirm menopausal status which would confirm no benefit for BSO for endometriosis.

## 2024-02-26 NOTE — PROGRESS NOTES
[facility], later [facility]  Progress Note  Name: Celine Roa I  MRN: [id number]  Unit/Bed#: [place] [address] -01 I Date of Admission: 2/13/2024   Date of Service: 2/26/2024 I Hospital Day: 13    Assessment/Plan   * Right upper quadrant abdominal pain  Assessment & Plan  Persistent RUQ pain worse with regular food intake  S/p ERCP and sphincterotomy  MRCP with stable findings  Possible biliary dyskinesia with known sphincter of Oddi dysfunction  Possible uncontrolled GERD while off protonix per GI  Protonix restarted  The patient tolerated regular food last evening but is having more pain this morning  GYN input is greatly appreciated.  GI reevaluation is pending.      Crohn's colitis (HCC)  Assessment & Plan  History of Crohn's disease on Skyrizi as well as methotrexate  Solumedrol switched to PO prednisone 40 per GI but patient unable to tolerate prednisone tablet   Slow taper per GI      Endometriosis  Assessment & Plan  GYN recommendations are greatly appreciated.    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HCC)  Assessment & Plan  Corn allergy noted   Multiple drug allergies noted but tolerated zosyn and unasyn  She tolerated lovenox and bentyl which were added recently  Continue PRN benadryl and start topical steroid for rash    Acquired hypothyroidism  Assessment & Plan  Continue cytomel daily    GERD without esophagitis  Assessment & Plan  Stable.   Protonix restarted    Bladder spasm  Assessment & Plan  Reports decreased urinary output and having to push to urinate  Bladder scans within normal limits, no evidence of hydroureteronephrosis on ultrasound  Evaluated by urology at [facility] and placed on Ditropan  Will need outpatient urodynamic studies as well as discussion on further medical management             Subjective:  The patient is feeling better overall though she continues to have significant pain at times.  She was able to tolerate a full diet last evening.  She ate breakfast this morning but  had increased pain thereafter.  She has had some nausea but no vomiting.  She has had multiple bowel movements over the last 2 days.  She feels bloated.  She has no chest pain or shortness of breath.    Physical Exam:   Temp:  [98.6 °F (37 °C)-99.2 °F (37.3 °C)] 98.6 °F (37 °C)  HR:  [81-89] 89  Resp:  [16-18] 16  BP: (113-123)/(74-85) 123/85    Gen: Well-developed, well-nourished, in no distress.  Neck: Supple.  No lymphadenopathy, goiter, or bruit.  Heart: Regular rhythm.  I heard no murmur, gallop, or rub.  Lungs: Clear to auscultation and percussion.  No wheezing, rales, or rhonchi.  Abd: Soft but somewhat distended.  Right upper quadrant tenderness is noted.  There is no mass, rebound, or organomegaly.  Extremities: No clubbing, cyanosis, or edema.  No calf tenderness.  Neuro: Alert and oriented.  No focal sign.  Skin: Warm and dry.      LABS: No new labs.      VTE Pharmacologic Prophylaxis: Enoxaparin (Lovenox)  VTE Mechanical Prophylaxis: sequential compression device

## 2024-02-26 NOTE — QUICK NOTE
I offered support and coordination of care for Celine after Dr. Johnston and Dr. Chandler's consult. I informed Celine of the consideration for leuprolide acetate and that the prescription has been sent to her pharmacy to allow the pharmacy to attempt to fill it. I encouraged her to contact her pharmacy to check the ingredients against her allergy list.     Separately, Dr. Benito has advised to check FSH, LH, Estradiol which may confirm no benefit for BSO.     I informed her that leuprolide acetate is an outpatient treatment done in conjunction with a GYN specialist. Outpatient follow up and coordination of care is the preferred approach to ongoing care. Discharge from the hospital followed by GYN appointment with Dr. Benito is the expected next step towards this goal. The goal of discharge is shared among Celine and the GYN team. I appreciate the care of the primary team in helping reach this goal as well. Celine explained that prior hospitalizations have been prolonged and often rely on steroids to improve her condition to the point of being discharged. I cannot comment on the specific timing of discharge or the pre-requisites for discharge in this case, and I defer management of steroids and other inpatient meds to the primary team. While she remains admitted, no new GYN-related medications are recommended at this time.    Will follow up tomorrow re: labs per Dr. SALGUERO's recommendation. Will also follow up tomorrow re: ingredients of leuprolide acetate at her pharmacy.

## 2024-02-26 NOTE — UTILIZATION REVIEW
Nivia Warren RN  Registered Nurse  Specialty: Medical Surgical     Utilization Review     Signed     Date of Service: 2/23/2024  5:09 PM     Signed         Continued Stay Review     Date: 2/23                           Current Patient Class: IP                 Current Level of Care: MS     HPI:49 y.o. female initially admitted on 2/13      Assessment/Plan:   IV analgesia d/c 2/22.  Only using Tramadol for pain.  Advancing diet, slow steroid taper per GI.  Pain is improving.  Constipation resolved.  Discussion on starting Rinvoq with GI.      Vital Signs:   Date/Time Temp Pulse Resp BP MAP (mmHg) SpO2 O2 Device Patient Position - Orthostatic VS   02/23/24 15:48:38 99.2 °F (37.3 °C) 90 17 122/84 97 93 % None (Room air) --   02/23/24 07:00:14 98.7 °F (37.1 °C) 75 17 116/69 85 95 % None (Room air) --   02/22/24 21:02:49 98.9 °F (37.2 °C) 62 18 100/64 76 95 % None (Room air) Lying   02/22/24 1459 98.3 °F (36.8 °C) 84 18 148/95 113 97 % -- --   02/22/24 0850 -- -- -- -- -- -- None (Room air) --   02/22/24 06:55:38 98.4 °F (36.9 °C) 51 Abnormal  18 119/75 90 97 % -- --   02/21/24 21:35:27 98 °F (36.7 °C) 62 -- 116/76 89 96 % -- --   02/21/24 15:53:45 98.6 °F (37 °C) -- 16 119/77 91 -- -- Lying   02/21/24 0930 -- -- -- -- -- -- None (Room air) --   02/21/24 08:58:42 -- 79 20 120/83 95 95 % -- --   02/21/24 0815 -- -- -- -- -- -- None (Room air) --         Pertinent Labs/Diagnostic Results:                Results from last 7 days   Lab Units 02/23/24  0451 02/22/24  0531 02/20/24  0507 02/19/24  0527 02/18/24  0521   WBC Thousand/uL 15.24* 15.55* 11.35* 12.34* 11.71*   HEMOGLOBIN g/dL 14.0 13.1 12.8 13.1 12.9   HEMATOCRIT % 42.1 39.0 38.6 39.5 37.8   PLATELETS Thousands/uL 483* 407* 377 255 355   NEUTROS ABS Thousands/µL 11.64* 10.00* 7.25 8.82* 9.81*                    Results from last 7 days   Lab Units 02/23/24  0451 02/22/24  0531 02/20/24  0507 02/19/24  0823 02/18/24  0521 02/17/24  0535   SODIUM mmol/L 137  138 140 139 138 137   POTASSIUM mmol/L 3.8 3.5 3.4* 3.2* 3.6 3.7   CHLORIDE mmol/L 102 103 105 106 105 107   CO2 mmol/L 26 29 26 27 26 22   ANION GAP mmol/L 9 6 9 6 7 8   BUN mg/dL 11 15 9 8 6 2*   CREATININE mg/dL 0.61 0.70 0.52* 0.61 0.67 0.58*   EGFR ml/min/1.73sq m 105 100 111 106 102 107   CALCIUM mg/dL 8.8 8.6 8.1* 8.2* 8.3* 7.5*   MAGNESIUM mg/dL  --   --   --  1.9 1.9 1.9   PHOSPHORUS mg/dL  --   --   --  2.6* 2.7 2.8              Results from last 7 days   Lab Units 02/20/24  0507 02/19/24  0823 02/18/24  0521 02/17/24  0535   AST U/L 35 25 36 87*   ALT U/L 112* 127* 179* 253*   ALK PHOS U/L 148* 150* 175* 184*   TOTAL PROTEIN g/dL 5.7* 5.4* 5.5* 5.4*   ALBUMIN g/dL 3.2* 3.1* 3.1* 3.0*   TOTAL BILIRUBIN mg/dL 0.64 0.62 1.12* 2.02*   BILIRUBIN DIRECT mg/dL 0.20  --   --   --                     Results from last 7 days   Lab Units 02/23/24  0451 02/22/24  0531 02/20/24  0507 02/19/24  0823 02/18/24  0521 02/17/24  0535   GLUCOSE RANDOM mg/dL 93 99 88 105 173* 76            Results from last 7 days   Lab Units 02/19/24  0823   LIPASE u/L 9*           Results from last 7 days   Lab Units 02/20/24  0507   CRP mg/L 7.6*      Medications:   Scheduled Medications:  acetaminophen, 650 mg, Oral, Q8H KARISSA  bisacodyl, 10 mg, Oral, Daily  dicyclomine, 20 mg, Oral, 4x Daily (AC & HS)  enoxaparin, 40 mg, Subcutaneous, Q24H KARISSA  famotidine, 40 mg, Oral, BID  folic acid, 1 mg, Oral, Daily  hydrOXYzine HCL, 15 mg, Oral, Daily  levothyroxine, 75 mcg, Oral, Every Other Day  liothyronine, 5 mcg, Oral, Daily  loratadine, 10 mg, Oral, Daily  mirtazapine, 15 mg, Oral, HS  montelukast, 10 mg, Oral, HS  predniSONE, 40 mg, Oral, Daily        Continuous IV Infusions:  PRN Meds:  butalbital-acetaminophen-caffeine, 1 tablet, Oral, Q4H PRN - x 1 2/23  diphenhydrAMINE, 50 mg, Intravenous, Q4H PRN - x 5 2/22, x 3 2/23  ondansetron, 4 mg, Intravenous, Q6H PRN  oxybutynin, 5 mg, Oral, BID PRN  traMADol, 50 mg, Oral, Q6H PRN - x1 2/22,  x 2  2/23        Discharge Plan: home     Network Utilization Review Department  ATTENTION: Please call with any questions or concerns to [phone] and carefully listen to the prompts so that you are directed to the right person. All voicemails are confidential.   For Discharge needs, contact Care Management DC Support Team at [phone] opt. 2  Send all requests for admission clinical reviews, approved or denied determinations and any other requests to dedicated fax number below belonging to the campus where the patient is receiving treatment. List of dedicated fax numbers for the Facilities:  FACILITY NAME UR FAX NUMBER   ADMISSION DENIALS (Administrative/Medical Necessity) [phone]   DISCHARGE SUPPORT TEAM (NETWORK) [phone]   PARENT CHILD HEALTH (Maternity/NICU/Pediatrics) [phone]   [facility] [phone]   [facility] [phone]   [facility] [phone]   [facility] [phone]   Pending sale to [facility] [phone]   [facility] [phone]   [facility] [phone]   [facility] [phone]   [facility] [phone]   [facility] [phone]   [facility] [phone]   [facility] [phone]

## 2024-02-26 NOTE — ASSESSMENT & PLAN NOTE
History of Crohn's disease on Skyrizi as well as methotrexate  Solumedrol switched to PO prednisone 40 per GI but patient unable to tolerate prednisone tablet   Slow taper per GI

## 2024-02-26 NOTE — ASSESSMENT & PLAN NOTE
Persistent RUQ pain worse with regular food intake  S/p ERCP and sphincterotomy  MRCP with stable findings  Possible biliary dyskinesia with known sphincter of Oddi dysfunction  Possible uncontrolled GERD while off protonix per GI  Protonix restarted  The patient tolerated regular food last evening but is having more pain this morning  GYN input is greatly appreciated.  GI reevaluation is pending.

## 2024-02-26 NOTE — ASSESSMENT & PLAN NOTE
Reports decreased urinary output and having to push to urinate  Bladder scans within normal limits, no evidence of hydroureteronephrosis on ultrasound  Evaluated by urology at [facility] and placed on Ditropan  Will need outpatient urodynamic studies as well as discussion on further medical management

## 2024-02-27 ENCOUNTER — HOSPITAL ENCOUNTER (OUTPATIENT)
Dept: INFUSION CENTER | Facility: HOSPITAL | Age: 49
End: 2024-02-27
Attending: INTERNAL MEDICINE

## 2024-02-27 ENCOUNTER — TELEPHONE (OUTPATIENT)
Dept: PSYCHIATRY | Facility: CLINIC | Age: 49
End: 2024-02-27

## 2024-02-27 LAB
CALPROTECTIN STL-MCNT: 23 UG/G (ref 0–120)
ESTRADIOL SERPL-MCNC: 90.5 PG/ML
FSH SERPL-ACNC: 4.6 MIU/ML
LH SERPL-ACNC: 0.9 MIU/ML

## 2024-02-27 PROCEDURE — 83002 ASSAY OF GONADOTROPIN (LH): CPT | Performed by: OBSTETRICS & GYNECOLOGY

## 2024-02-27 PROCEDURE — 99232 SBSQ HOSP IP/OBS MODERATE 35: CPT | Performed by: INTERNAL MEDICINE

## 2024-02-27 PROCEDURE — 83001 ASSAY OF GONADOTROPIN (FSH): CPT | Performed by: OBSTETRICS & GYNECOLOGY

## 2024-02-27 PROCEDURE — 82670 ASSAY OF TOTAL ESTRADIOL: CPT | Performed by: OBSTETRICS & GYNECOLOGY

## 2024-02-27 PROCEDURE — C9113 INJ PANTOPRAZOLE SODIUM, VIA: HCPCS | Performed by: INTERNAL MEDICINE

## 2024-02-27 RX ORDER — MOMETASONE FUROATE 1 MG/G
CREAM TOPICAL DAILY
Status: DISCONTINUED | OUTPATIENT
Start: 2024-02-27 | End: 2024-03-07 | Stop reason: HOSPADM

## 2024-02-27 RX ADMIN — HYDROMORPHONE HYDROCHLORIDE 0.5 MG: 1 INJECTION, SOLUTION INTRAMUSCULAR; INTRAVENOUS; SUBCUTANEOUS at 14:07

## 2024-02-27 RX ADMIN — HYDROMORPHONE HYDROCHLORIDE 0.5 MG: 1 INJECTION, SOLUTION INTRAMUSCULAR; INTRAVENOUS; SUBCUTANEOUS at 23:44

## 2024-02-27 RX ADMIN — PANTOPRAZOLE SODIUM 40 MG: 40 INJECTION, POWDER, FOR SOLUTION INTRAVENOUS at 09:11

## 2024-02-27 RX ADMIN — MONTELUKAST 10 MG: 10 TABLET, FILM COATED ORAL at 20:46

## 2024-02-27 RX ADMIN — SODIUM CHLORIDE, SODIUM GLUCONATE, SODIUM ACETATE, POTASSIUM CHLORIDE, MAGNESIUM CHLORIDE, SODIUM PHOSPHATE, DIBASIC, AND POTASSIUM PHOSPHATE 100 ML/HR: .53; .5; .37; .037; .03; .012; .00082 INJECTION, SOLUTION INTRAVENOUS at 18:48

## 2024-02-27 RX ADMIN — HYDROMORPHONE HYDROCHLORIDE 0.2 MG: 0.2 INJECTION, SOLUTION INTRAMUSCULAR; INTRAVENOUS; SUBCUTANEOUS at 20:46

## 2024-02-27 RX ADMIN — MOMETASONE FUROATE: 1 CREAM TOPICAL at 18:41

## 2024-02-27 RX ADMIN — DIPHENHYDRAMINE HYDROCHLORIDE 50 MG: 50 INJECTION, SOLUTION INTRAMUSCULAR; INTRAVENOUS at 06:53

## 2024-02-27 RX ADMIN — HYDROMORPHONE HYDROCHLORIDE 0.5 MG: 1 INJECTION, SOLUTION INTRAMUSCULAR; INTRAVENOUS; SUBCUTANEOUS at 04:55

## 2024-02-27 RX ADMIN — SODIUM CHLORIDE, SODIUM GLUCONATE, SODIUM ACETATE, POTASSIUM CHLORIDE, MAGNESIUM CHLORIDE, SODIUM PHOSPHATE, DIBASIC, AND POTASSIUM PHOSPHATE 100 ML/HR: .53; .5; .37; .037; .03; .012; .00082 INJECTION, SOLUTION INTRAVENOUS at 09:10

## 2024-02-27 RX ADMIN — HYDROMORPHONE HYDROCHLORIDE 0.5 MG: 1 INJECTION, SOLUTION INTRAMUSCULAR; INTRAVENOUS; SUBCUTANEOUS at 00:46

## 2024-02-27 RX ADMIN — HYDROMORPHONE HYDROCHLORIDE 0.2 MG: 0.2 INJECTION, SOLUTION INTRAMUSCULAR; INTRAVENOUS; SUBCUTANEOUS at 11:44

## 2024-02-27 RX ADMIN — ENOXAPARIN SODIUM 40 MG: 40 INJECTION SUBCUTANEOUS at 09:11

## 2024-02-27 RX ADMIN — DIPHENHYDRAMINE HYDROCHLORIDE 50 MG: 50 INJECTION, SOLUTION INTRAMUSCULAR; INTRAVENOUS at 02:30

## 2024-02-27 RX ADMIN — HYDROMORPHONE HYDROCHLORIDE 0.2 MG: 0.2 INJECTION, SOLUTION INTRAMUSCULAR; INTRAVENOUS; SUBCUTANEOUS at 06:53

## 2024-02-27 RX ADMIN — HYDROMORPHONE HYDROCHLORIDE 0.2 MG: 0.2 INJECTION, SOLUTION INTRAMUSCULAR; INTRAVENOUS; SUBCUTANEOUS at 02:30

## 2024-02-27 RX ADMIN — MIRTAZAPINE 15 MG: 15 TABLET, FILM COATED ORAL at 20:46

## 2024-02-27 RX ADMIN — METHYLPREDNISOLONE SODIUM SUCCINATE 40 MG: 40 INJECTION, POWDER, FOR SOLUTION INTRAMUSCULAR; INTRAVENOUS at 09:11

## 2024-02-27 RX ADMIN — DIPHENHYDRAMINE HYDROCHLORIDE 50 MG: 50 INJECTION, SOLUTION INTRAMUSCULAR; INTRAVENOUS at 16:24

## 2024-02-27 RX ADMIN — HYDROMORPHONE HYDROCHLORIDE 0.2 MG: 0.2 INJECTION, SOLUTION INTRAMUSCULAR; INTRAVENOUS; SUBCUTANEOUS at 16:24

## 2024-02-27 RX ADMIN — DIPHENHYDRAMINE HYDROCHLORIDE 50 MG: 50 INJECTION, SOLUTION INTRAMUSCULAR; INTRAVENOUS at 20:46

## 2024-02-27 RX ADMIN — DIPHENHYDRAMINE HYDROCHLORIDE 50 MG: 50 INJECTION, SOLUTION INTRAMUSCULAR; INTRAVENOUS at 11:44

## 2024-02-27 RX ADMIN — HYDROMORPHONE HYDROCHLORIDE 0.5 MG: 1 INJECTION, SOLUTION INTRAMUSCULAR; INTRAVENOUS; SUBCUTANEOUS at 18:41

## 2024-02-27 RX ADMIN — HYDROXYZINE HYDROCHLORIDE 15 MG: 10 TABLET ORAL at 09:11

## 2024-02-27 RX ADMIN — HYDROMORPHONE HYDROCHLORIDE 0.5 MG: 1 INJECTION, SOLUTION INTRAMUSCULAR; INTRAVENOUS; SUBCUTANEOUS at 09:15

## 2024-02-27 NOTE — PROGRESS NOTES
[facility]  Progress Note  Name: Celine Roa I  MRN: [id number]  Unit/Bed#: [place] [address] -01 I Date of Admission: 2/13/2024   Date of Service: 2/27/2024 I Hospital Day: 14    Assessment/Plan   * Right upper quadrant abdominal pain  Assessment & Plan  Persistent RUQ pain worse with regular food intake  S/p ERCP and sphincterotomy  MRCP with stable findings  Possible biliary dyskinesia with known sphincter of Oddi dysfunction  Possible uncontrolled GERD while off protonix per GI  Protonix restarted  The patient tolerated regular food last evening but is having more pain this morning  GYN input is greatly appreciated.  GI reevaluation is pending.      Crohn's colitis (HCC)  Assessment & Plan  History of Crohn's disease on Skyrizi as well as methotrexate  Solumedrol switched to PO prednisone 40 per GI but patient unable to tolerate prednisone tablet   Slow taper per GI      Endometriosis  Assessment & Plan  GYN recommendations are greatly appreciated.    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HCC)  Assessment & Plan  Corn allergy noted   Multiple drug allergies noted but tolerated zosyn and unasyn  She tolerated lovenox and bentyl which were added recently  Continue PRN benadryl and start topical steroid for rash    Acquired hypothyroidism  Assessment & Plan  Continue cytomel daily    GERD without esophagitis  Assessment & Plan  Stable.   Protonix restarted         The patient has gotten somewhat better.  After review of consultants recommendations and long discussion with the patient, I have elected to maintain her current dose of steroids for now.  Her endometriosis medication will be available at her outside pharmacy on Thursday.  If she remains stable, we will try to discharge her on Thursday.  She said that she is able to go into the infusion center to receive IV fluid and IV steroids as needed.      Subjective:  The patient is feeling a bit better.  Her abdominal pain is not quite as  prominent.  She has been able to tolerate her diet a bit better.  Her strength is improved and she has [place]  She denies any chest pain or shortness of breath.    Physical Exam:   Temp:  [98.8 °F (37.1 °C)-99.2 °F (37.3 °C)] 99.1 °F (37.3 °C)  HR:  [64-75] 75  Resp:  [16-20] 16  BP: (107-124)/(73-85) 112/74    Gen: Well-developed, well-nourished, in no distress.  Neck: Supple.  No lymphadenopathy or goiter.  Heart: Regular rhythm.  I heard no murmur, gallop, or rub.  Lungs: Clear to auscultation and percussion.  No wheezing, rales, or rhonchi.  Abd: Soft with active bowel sounds.  Mild diffuse tenderness.  No mass or organomegaly.  Extremities: No clubbing, cyanosis, or edema.  No calf tenderness.  Neuro: Alert and oriented.  No focal sign.  Skin: Warm and dry.      LABS: No new labs.          VTE Pharmacologic Prophylaxis: Enoxaparin (Lovenox)  VTE Mechanical Prophylaxis: sequential compression device

## 2024-02-27 NOTE — PROGRESS NOTES
Progress Note- Celine Roa 49 y.o. female MRN: [id number]    Unit/Bed#: [place] [address] -01 Encounter: [id number]      Assessment and Plan:  48-year-old woman with multiple medical issues as documented below.  These include Crohn's disease, mast cell activation syndrome, and history of endometriosis.  She has chronic abdominal pain and is status post cholecystectomy and sphincterotomy.  She developed bacteremia after recent ERCP.  She is now back to having her baseline abdominal pain.  1.  She had GYN recommendations.  The patient is looking forward to starting treatment with leuprolide.  2.  Continue follow-up Dr. Levine for Crohn's disease.  Continue Skyrizi.  3.  She is set up for IV hydration and episodic steroids per Dr. Levine's recommendations.  4.  Diet as tolerated.    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  The patient has been extensively worked up as documented in Dr. Levine's note from 2/13/2024.  She is status post cholecystectomy.  She has history of sphincterotomy and stent placement in 2014 for sphincter of Oddi dysfunction.  She also has chronic intra and extrahepatic biliary dilatation with normal LFTs.  She underwent repeat ERCP with sphincterotomy and balloon sweeps of the CBD on 2/14/2024.  She then developed Klebsiella bacteremia which has been treated and she is no longer on antibiotics.  She had, at best, mild Crohn's disease on recent flexible sigmoidoscopy.  She receives outpatient IV hydration and episodic IV steroids due to her diagnoses of mast cell degranulation syndrome and Crohn's.    She complains of near constant  abdominal pain which is diffuse, but somewhat worse in the right upper quadrant.  She reports multiple aggravating factors such as eating, moving, sitting, and having bowel movements.    She reports history of endometriosis which, according to her, involved the lungs, and wants to know whether her chronic abdominal pain may be due to this.  She was eval waited by Dr. Jennings from GYN during this admission and is going to start outpatient treatment with leuprolide.    Medication Administration - last 24 hours from 02/26/2024 1633 to 02/27/2024 1633         Date/Time Order Dose Route Action Action by     02/27/2024 0911 EST hydrOXYzine HCL (ATARAX) tablet 15 mg 15 mg Oral Given Colette Rosenberg, RN     02/26/2024 2223 EST mirtazapine (REMERON) tablet 15 mg 15 mg Oral Given Chato Joseph, RN     02/26/2024 2223 EST montelukast (SINGULAIR) tablet 10 mg 10 mg Oral Given Chato Joseph, RN     02/27/2024 1624 EST diphenhydrAMINE (BENADRYL) injection 50 mg 50 mg Intravenous Given Colette Rosenberg, RN     02/27/2024 1144 EST diphenhydrAMINE (BENADRYL) injection 50 mg 50 mg Intravenous Given Colette Rosenberg, RN     02/27/2024 0653 EST diphenhydrAMINE (BENADRYL) injection 50 mg 50 mg Intravenous Given Chato Flhilarya, RN     02/27/2024 0230 EST diphenhydrAMINE (BENADRYL) injection 50 mg 50 mg Intravenous Given Chato Flhilarya, RN     02/26/2024 2222 EST diphenhydrAMINE (BENADRYL) injection 50 mg 50 mg Intravenous Given Chato Flauta, RN     02/26/2024 1727 EST diphenhydrAMINE (BENADRYL) injection 50 mg 50 mg Intravenous Given Da Brownlee, RN     02/27/2024 1410 EST acetaminophen (TYLENOL) tablet 650 mg 650 mg Oral Not Given Colette Rosenberg, ROLLY     02/27/2024 0505 EST acetaminophen (TYLENOL) tablet 650 mg 650 mg Oral Not Given Chato Flauta, RN     02/26/2024 2221 EST acetaminophen (TYLENOL) tablet 650 mg 650 mg Oral Not Given Chato Flauta, RN     02/27/2024 0911 EST enoxaparin (LOVENOX) subcutaneous injection 40 mg 40 mg Subcutaneous Given  Colette Rosenberg, RN     02/27/2024 0911 EST pantoprazole (PROTONIX) injection 40 mg 40 mg Intravenous Given Colette Rosenberg, RN     02/27/2024 0910 EST multi-electrolyte (PLASMALYTE-A/ISOLYTE-S PH 7.4) IV solution 100 mL/hr Intravenous New Bag Colettegabe Rosenberg, RN     02/26/2024 2228 EST multi-electrolyte (PLASMALYTE-A/ISOLYTE-S PH 7.4) IV solution 100 mL/hr Intravenous New Bag Chato Flautorlando, RN     02/27/2024 0915 EST mometasone (ELOCON) 0.1 % cream 0 Application Topical Hold Colette Rosenberg, RN     02/27/2024 1624 EST HYDROmorphone HCl (DILAUDID) injection 0.2 mg 0.2 mg Intravenous Given Colette Rosenberg, RN     02/27/2024 1144 EST HYDROmorphone HCl (DILAUDID) injection 0.2 mg 0.2 mg Intravenous Given Colette Rosenberg, RN     02/27/2024 0653 EST HYDROmorphone HCl (DILAUDID) injection 0.2 mg 0.2 mg Intravenous Given Chato Flauta, RN     02/27/2024 0230 EST HYDROmorphone HCl (DILAUDID) injection 0.2 mg 0.2 mg Intravenous Given Chato Flauta, RN     02/26/2024 2223 EST HYDROmorphone HCl (DILAUDID) injection 0.2 mg 0.2 mg Intravenous Given Chato Flauta, RN     02/26/2024 1727 EST HYDROmorphone HCl (DILAUDID) injection 0.2 mg 0.2 mg Intravenous Given Da Brownlee, RN     02/27/2024 1407 EST HYDROmorphone (DILAUDID) injection 0.5 mg 0.5 mg Intravenous Given Colette Chet, RN     02/27/2024 0915 EST HYDROmorphone (DILAUDID) injection 0.5 mg 0.5 mg Intravenous Given Colette Rosenberg, RN     02/27/2024 0455 EST HYDROmorphone (DILAUDID) injection 0.5 mg 0.5 mg Intravenous Given Chato Flauta, RN     02/27/2024 0046 EST HYDROmorphone (DILAUDID) injection 0.5 mg 0.5 mg Intravenous Given Chato Joseph RN     02/26/2024 1953 EST HYDROmorphone (DILAUDID) injection 0.5 mg 0.5 mg Intravenous Given Chato Joseph RN     02/27/2024 0911 EST methylPREDNISolone sodium succinate (Solu-MEDROL) injection 40 mg 40 mg Intravenous Given Colette Rosenberg RN            Objective:     Vitals: Blood pressure 112/74, pulse 75, temperature 99.1 °F (37.3 °C), resp.  "rate 16, height 5' 5\" (1.651 m), weight 73.4 kg (161 lb 13.1 oz), SpO2 96%, not currently breastfeeding.,Body mass index is 26.93 kg/m².      Intake/Output Summary (Last 24 hours) at 2/27/2024 1633  Last data filed at 2/27/2024 0910  Gross per 24 hour   Intake 1070 ml   Output --   Net 1070 ml       Physical Exam:   General Appearance: Awake and alert, in no acute distress  Abdomen: Soft, mild diffuse tenderness to palpation    Invasive Devices       Peripheral Intravenous Line  Duration             Peripheral IV 02/23/24 Right;Ventral (anterior) Forearm 4 days              Line  Duration             Pump Device  -- days                    Lab Results:  No results displayed because visit has over 200 results.          Imaging Studies: I have personally reviewed pertinent imaging studies.      "

## 2024-02-27 NOTE — PLAN OF CARE
Problem: PAIN - ADULT  Goal: Verbalizes/displays adequate comfort level or baseline comfort level  Description: Interventions:  - Encourage patient to monitor pain and request assistance  - Assess pain using appropriate pain scale  - Administer analgesics based on type and severity of pain and evaluate response  - Implement non-pharmacological measures as appropriate and evaluate response  - Consider cultural and social influences on pain and pain management  - Notify physician/advanced practitioner if interventions unsuccessful or patient reports new pain  Outcome: Progressing     Problem: Knowledge Deficit  Goal: Patient/family/caregiver demonstrates understanding of disease process, treatment plan, medications, and discharge instructions  Description: Complete learning assessment and assess knowledge base.  Interventions:  - Provide teaching at level of understanding  - Provide teaching via preferred learning methods  Outcome: Progressing     Problem: GASTROINTESTINAL - ADULT  Goal: Minimal or absence of nausea and/or vomiting  Description: INTERVENTIONS:  - Administer IV fluids if ordered to ensure adequate hydration  - Maintain NPO status until nausea and vomiting are resolved  - Nasogastric tube if ordered  - Administer ordered antiemetic medications as needed  - Provide nonpharmacologic comfort measures as appropriate  - Advance diet as tolerated, if ordered  - Consider nutrition services referral to assist patient with adequate nutrition and appropriate food choices  Outcome: Progressing  Goal: Maintains or returns to baseline bowel function  Description: INTERVENTIONS:  - Assess bowel function  - Encourage oral fluids to ensure adequate hydration  - Administer IV fluids if ordered to ensure adequate hydration  - Administer ordered medications as needed  - Encourage mobilization and activity  - Consider nutritional services referral to assist patient with adequate nutrition and appropriate food  choices  Outcome: Progressing  Goal: Maintains adequate nutritional intake  Description: INTERVENTIONS:  - Monitor percentage of each meal consumed  - Identify factors contributing to decreased intake, treat as appropriate  - Assist with meals as needed  - Monitor I&O, weight, and lab values if indicated  - Obtain nutrition services referral as needed  Outcome: Progressing     Problem: METABOLIC, FLUID AND ELECTROLYTES - ADULT  Goal: Fluid balance maintained  Description: INTERVENTIONS:  - Monitor labs   - Monitor I/O and WT  - Instruct patient on fluid and nutrition as appropriate  - Assess for signs & symptoms of volume excess or deficit  Outcome: Progressing

## 2024-02-27 NOTE — QUICK NOTE
Coordination of care continued from yesterday:  Lab as ordered by Dr. Benito do not suggest menopause. Pt informed.     The theory of endometriosis-related pain is unlikely but possible. The treatment has been ordered and Celine has reviewed with her pharmacist and finds no allergens or contraindications.     I reinforced that the treatment would be outpatient and should only be done with coordination with DR. Benito. I will send a message to his office to request an appointment late this week or early next week.     No further GYN interventions or treatments while inpatient. No further questions from Celine or her  today.     GYN team will sign off unless further questions are needed.    Eagle Jennings MD  OB/GYN  2/27/2024 5:16 PM

## 2024-02-27 NOTE — UTILIZATION REVIEW
Continued Stay Review for 2/26/24     Date: 2/27/24                          Current Patient Class: IP  Current Level of Care: MS    HPI:49 y.o. female initially admitted on 2/13/24 - DX:  Right upper quadrant abdominal pain / Crohn's colitis / Endometriosis / Mast cell activation syndrome / Bladder spasm     Assessment/Plan:   2/26/24: The patient tolerated regular food last evening but is having more pain this morning. She has had some nausea but no vomiting. She has had multiple bowel movements over the last 2 days. She feels bloated.     Plan: cont iv solumedrol; cont iv protonix; cont ivf; pain / nausea control (see below); monitor labs; f/u GI consult    Vital Signs:   Date/Time Temp Pulse Resp BP MAP (mmHg) SpO2 O2 Device Patient Position - Orthostatic VS   02/26/24 22:03:58 98.8 °F (37.1 °C) 73 20 124/85 98 99 % None (Room air) Lying   02/26/24 2100 -- -- -- -- -- 95 % None (Room air) --   02/26/24 15:39:36 98.7 °F (37.1 °C) 74 16 126/85 99 98 % -- --   02/26/24 0808 -- -- -- -- -- -- None (Room air) --   02/26/24 07:32:58 98.6 °F (37 °C) -- 16 123/85 98 -- -- --       Pertinent Labs/Diagnostic Results:     Results from last 7 days   Lab Units 02/23/24  0451 02/22/24  0531   WBC Thousand/uL 15.24* 15.55*   HEMOGLOBIN g/dL 14.0 13.1   HEMATOCRIT % 42.1 39.0   PLATELETS Thousands/uL 483* 407*   NEUTROS ABS Thousands/µL 11.64* 10.00*       Results from last 7 days   Lab Units 02/23/24  0451 02/22/24  0531   SODIUM mmol/L 137 138   POTASSIUM mmol/L 3.8 3.5   CHLORIDE mmol/L 102 103   CO2 mmol/L 26 29   ANION GAP mmol/L 9 6   BUN mg/dL 11 15   CREATININE mg/dL 0.61 0.70   EGFR ml/min/1.73sq m 105 100   CALCIUM mg/dL 8.8 8.6     Results from last 7 days   Lab Units 02/24/24  0436   AST U/L 38   ALT U/L 91*   ALK PHOS U/L 117*   TOTAL PROTEIN g/dL 6.2*   ALBUMIN g/dL 3.4*   TOTAL BILIRUBIN mg/dL 0.71   BILIRUBIN DIRECT mg/dL 0.18       Results from last 7 days   Lab Units 02/23/24  0451 02/22/24  0531   GLUCOSE  RANDOM mg/dL 93 99     Medications:   Scheduled Medications:  acetaminophen, 650 mg, Oral, Q8H KARISSA  enoxaparin, 40 mg, Subcutaneous, Q24H KARISSA  hydrOXYzine HCL, 15 mg, Oral, Daily  methylPREDNISolone sodium succinate, 40 mg, Intravenous, Daily  mirtazapine, 15 mg, Oral, HS  mometasone, , Topical, Daily  montelukast, 10 mg, Oral, HS  pantoprazole, 40 mg, Intravenous, Q24H KARISSA      Continuous IV Infusions:  multi-electrolyte, 100 mL/hr, Intravenous, Continuous      PRN Meds:  bisacodyl, 5 mg, Oral, Daily PRN  butalbital-acetaminophen-caffeine, 1 tablet, Oral, Q4H PRN  dicyclomine, 20 mg, Oral, TID PRN  diphenhydrAMINE, 50 mg, Intravenous, Q4H PRN  (2/26 rec'd x5)   HYDROmorphone, 0.5 mg, Intravenous, Q4H PRN  (2/26 rec'd x4)   HYDROmorphone, 0.2 mg, Intravenous, Q4H PRN  (2/26 rec'd x4)   ondansetron, 4 mg, Intravenous, Q6H PRN  oxybutynin, 5 mg, Oral, BID PRN  trimethobenzamide, 200 mg, Intramuscular, Q6H PRN        Discharge Plan: [facility]    Network Utilization Review Department  ATTENTION: Please call with any questions or concerns to [phone] and carefully listen to the prompts so that you are directed to the right person. All voicemails are confidential.   For Discharge needs, contact Care Management DC Support Team at [phone] opt. 2  Send all requests for admission clinical reviews, approved or denied determinations and any other requests to dedicated fax number below belonging to the campus where the patient is receiving treatment. List of dedicated fax numbers for the Facilities:  FACILITY NAME UR FAX NUMBER   ADMISSION DENIALS (Administrative/Medical Necessity) [phone]   DISCHARGE SUPPORT TEAM (NETWORK) [phone]   PARENT CHILD HEALTH (Maternity/NICU/Pediatrics) [phone]   [facility] [phone]   [facility] [phone]   [facility] [phone]   [facility] [phone]   [facility]  [facility] [phone]   [facility] [phone]   [facility] [phone]   [facility] [phone]   [facility] [phone]   [facility] [phone]   [facility] [phone]   [facility] [phone]

## 2024-02-28 PROCEDURE — C9113 INJ PANTOPRAZOLE SODIUM, VIA: HCPCS | Performed by: INTERNAL MEDICINE

## 2024-02-28 PROCEDURE — 99232 SBSQ HOSP IP/OBS MODERATE 35: CPT | Performed by: INTERNAL MEDICINE

## 2024-02-28 RX ORDER — LOPERAMIDE HYDROCHLORIDE 2 MG/1
2 CAPSULE ORAL EVERY 4 HOURS PRN
Status: DISCONTINUED | OUTPATIENT
Start: 2024-02-28 | End: 2024-03-07 | Stop reason: HOSPADM

## 2024-02-28 RX ORDER — PANTOPRAZOLE SODIUM 40 MG/1
40 TABLET, DELAYED RELEASE ORAL EVERY 24 HOURS
Status: DISCONTINUED | OUTPATIENT
Start: 2024-02-29 | End: 2024-03-04

## 2024-02-28 RX ADMIN — DIPHENHYDRAMINE HYDROCHLORIDE 50 MG: 50 INJECTION, SOLUTION INTRAMUSCULAR; INTRAVENOUS at 17:16

## 2024-02-28 RX ADMIN — SODIUM CHLORIDE, SODIUM GLUCONATE, SODIUM ACETATE, POTASSIUM CHLORIDE, MAGNESIUM CHLORIDE, SODIUM PHOSPHATE, DIBASIC, AND POTASSIUM PHOSPHATE 100 ML/HR: .53; .5; .37; .037; .03; .012; .00082 INJECTION, SOLUTION INTRAVENOUS at 04:01

## 2024-02-28 RX ADMIN — MIRTAZAPINE 15 MG: 15 TABLET, FILM COATED ORAL at 21:06

## 2024-02-28 RX ADMIN — HYDROMORPHONE HYDROCHLORIDE 0.2 MG: 0.2 INJECTION, SOLUTION INTRAMUSCULAR; INTRAVENOUS; SUBCUTANEOUS at 18:33

## 2024-02-28 RX ADMIN — SODIUM CHLORIDE, SODIUM GLUCONATE, SODIUM ACETATE, POTASSIUM CHLORIDE, MAGNESIUM CHLORIDE, SODIUM PHOSPHATE, DIBASIC, AND POTASSIUM PHOSPHATE 100 ML/HR: .53; .5; .37; .037; .03; .012; .00082 INJECTION, SOLUTION INTRAVENOUS at 18:39

## 2024-02-28 RX ADMIN — DIPHENHYDRAMINE HYDROCHLORIDE 50 MG: 50 INJECTION, SOLUTION INTRAMUSCULAR; INTRAVENOUS at 21:06

## 2024-02-28 RX ADMIN — MOMETASONE FUROATE: 1 CREAM TOPICAL at 08:28

## 2024-02-28 RX ADMIN — ENOXAPARIN SODIUM 40 MG: 40 INJECTION SUBCUTANEOUS at 08:28

## 2024-02-28 RX ADMIN — DIPHENHYDRAMINE HYDROCHLORIDE 50 MG: 50 INJECTION, SOLUTION INTRAMUSCULAR; INTRAVENOUS at 04:04

## 2024-02-28 RX ADMIN — HYDROMORPHONE HYDROCHLORIDE 0.2 MG: 0.2 INJECTION, SOLUTION INTRAMUSCULAR; INTRAVENOUS; SUBCUTANEOUS at 14:14

## 2024-02-28 RX ADMIN — LOPERAMIDE HYDROCHLORIDE 2 MG: 2 CAPSULE ORAL at 17:16

## 2024-02-28 RX ADMIN — ONDANSETRON 4 MG: 2 INJECTION INTRAMUSCULAR; INTRAVENOUS at 16:06

## 2024-02-28 RX ADMIN — DIPHENHYDRAMINE HYDROCHLORIDE 50 MG: 50 INJECTION, SOLUTION INTRAMUSCULAR; INTRAVENOUS at 12:52

## 2024-02-28 RX ADMIN — HYDROMORPHONE HYDROCHLORIDE 0.5 MG: 1 INJECTION, SOLUTION INTRAMUSCULAR; INTRAVENOUS; SUBCUTANEOUS at 17:15

## 2024-02-28 RX ADMIN — HYDROXYZINE HYDROCHLORIDE 15 MG: 10 TABLET ORAL at 08:28

## 2024-02-28 RX ADMIN — PANTOPRAZOLE SODIUM 40 MG: 40 INJECTION, POWDER, FOR SOLUTION INTRAVENOUS at 15:07

## 2024-02-28 RX ADMIN — MONTELUKAST 10 MG: 10 TABLET, FILM COATED ORAL at 21:06

## 2024-02-28 RX ADMIN — HYDROMORPHONE HYDROCHLORIDE 0.5 MG: 1 INJECTION, SOLUTION INTRAMUSCULAR; INTRAVENOUS; SUBCUTANEOUS at 12:52

## 2024-02-28 RX ADMIN — HYDROMORPHONE HYDROCHLORIDE 0.5 MG: 1 INJECTION, SOLUTION INTRAMUSCULAR; INTRAVENOUS; SUBCUTANEOUS at 21:06

## 2024-02-28 RX ADMIN — HYDROMORPHONE HYDROCHLORIDE 0.5 MG: 1 INJECTION, SOLUTION INTRAMUSCULAR; INTRAVENOUS; SUBCUTANEOUS at 04:04

## 2024-02-28 RX ADMIN — HYDROMORPHONE HYDROCHLORIDE 0.2 MG: 0.2 INJECTION, SOLUTION INTRAMUSCULAR; INTRAVENOUS; SUBCUTANEOUS at 22:31

## 2024-02-28 RX ADMIN — METHYLPREDNISOLONE SODIUM SUCCINATE 40 MG: 40 INJECTION, POWDER, FOR SOLUTION INTRAMUSCULAR; INTRAVENOUS at 15:07

## 2024-02-28 NOTE — ASSESSMENT & PLAN NOTE
GYN recommendations are greatly appreciated.  Hopefully, the patient will be able to be discharged tomorrow so that she can start therapy for this as an outpatient.

## 2024-02-28 NOTE — PROGRESS NOTES
[facility]  Progress Note  Name: Celine Roa I  MRN: [id number]  Unit/Bed#: [place] [address] -01 I Date of Admission: 2/13/2024   Date of Service: 2/28/2024 I Hospital Day: 15    Assessment/Plan   * Right upper quadrant abdominal pain  Assessment & Plan  Persistent RUQ pain worse with regular food intake  S/p ERCP and sphincterotomy  MRCP with stable findings  Possible biliary dyskinesia with known sphincter of Oddi dysfunction  Possible uncontrolled GERD while off protonix per GI  Protonix restarted  The patient tolerated regular food last evening but is having more pain this morning  GYN input is greatly appreciated.  GI reevaluation is appreciated.      Crohn's colitis (HCC)  Assessment & Plan  History of Crohn's disease on Skyrizi as well as methotrexate  Solumedrol switched to PO prednisone 40 per GI but patient unable to tolerate prednisone tablet   Slow taper per GI      Endometriosis  Assessment & Plan  GYN recommendations are greatly appreciated.  Hopefully, the patient will be able to be discharged tomorrow so that she can start therapy for this as an outpatient.    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HCC)  Assessment & Plan  Corn allergy noted   Multiple drug allergies noted but tolerated zosyn and unasyn  She tolerated lovenox and bentyl which were added recently  Continue PRN benadryl and start topical steroid for rash    Acquired hypothyroidism  Assessment & Plan  Continue cytomel daily    GERD without esophagitis  Assessment & Plan  Stable.   Protonix restarted             Subjective:  The patient was able to eat breakfast but had increased abdominal pain thereafter.  She has not vomited.  [place].  Her bowels moved today.    Physical Exam:   Temp:  [98.3 °F (36.8 °C)-99.1 °F (37.3 °C)] 98.7 °F (37.1 °C)  HR:  [71-75] 75  Resp:  [16-20] 18  BP: (109-118)/(70-75) 117/75    Gen: Well-developed, well-nourished, in no distress.  Neck:  Supple.  No lymphadenopathy, goiter, or bruit.  Heart: Regular rhythm.  I heard no murmur, gallop, or rub.  Lungs: Clear to auscultation and percussion.  No wheezing, rales, or rhonchi.  Abd: Soft but somewhat distended.  Mild diffuse tenderness.  No mass or rebound.  Extremities: No clubbing, cyanosis, or edema.  No calf tenderness.  Neuro: Alert and oriented.  No focal sign.  Skin: Warm and dry.      LABS: No new labs.      VTE Pharmacologic Prophylaxis: Enoxaparin (Lovenox)  VTE Mechanical Prophylaxis: sequential compression device

## 2024-02-28 NOTE — ASSESSMENT & PLAN NOTE
Persistent RUQ pain worse with regular food intake  S/p ERCP and sphincterotomy  MRCP with stable findings  Possible biliary dyskinesia with known sphincter of Oddi dysfunction  Possible uncontrolled GERD while off protonix per GI  Protonix restarted  The patient tolerated regular food last evening but is having more pain this morning  GYN input is greatly appreciated.  GI reevaluation is appreciated.

## 2024-02-28 NOTE — PLAN OF CARE
Problem: PAIN - ADULT  Goal: Verbalizes/displays adequate comfort level or baseline comfort level  Description: Interventions:  - Encourage patient to monitor pain and request assistance  - Assess pain using appropriate pain scale  - Administer analgesics based on type and severity of pain and evaluate response  - Implement non-pharmacological measures as appropriate and evaluate response  - Consider cultural and social influences on pain and pain management  - Notify physician/advanced practitioner if interventions unsuccessful or patient reports new pain  Outcome: Progressing       Problem: Knowledge Deficit  Goal: Patient/family/caregiver demonstrates understanding of disease process, treatment plan, medications, and discharge instructions  Description: Complete learning assessment and assess knowledge base.  Interventions:  - Provide teaching at level of understanding  - Provide teaching via preferred learning methods  Outcome: Progressing     Problem: PAIN - ADULT  Goal: Verbalizes/displays adequate comfort level or baseline comfort level  Description: Interventions:  - Encourage patient to monitor pain and request assistance  - Assess pain using appropriate pain scale  - Administer analgesics based on type and severity of pain and evaluate response  - Implement non-pharmacological measures as appropriate and evaluate response  - Consider cultural and social influences on pain and pain management  - Notify physician/advanced practitioner if interventions unsuccessful or patient reports new pain  Outcome: Progressing     Problem: GASTROINTESTINAL - ADULT  Goal: Minimal or absence of nausea and/or vomiting  Description: INTERVENTIONS:  - Administer IV fluids if ordered to ensure adequate hydration  - Maintain NPO status until nausea and vomiting are resolved  - Nasogastric tube if ordered  - Administer ordered antiemetic medications as needed  - Provide nonpharmacologic comfort measures as appropriate  -  Advance diet as tolerated, if ordered  - Consider nutrition services referral to assist patient with adequate nutrition and appropriate food choices  Outcome: Progressing  Goal: Maintains or returns to baseline bowel function  Description: INTERVENTIONS:  - Assess bowel function  - Encourage oral fluids to ensure adequate hydration  - Administer IV fluids if ordered to ensure adequate hydration  - Administer ordered medications as needed  - Encourage mobilization and activity  - Consider nutritional services referral to assist patient with adequate nutrition and appropriate food choices  Outcome: Progressing  Goal: Maintains adequate nutritional intake  Description: INTERVENTIONS:  - Monitor percentage of each meal consumed  - Identify factors contributing to decreased intake, treat as appropriate  - Assist with meals as needed  - Monitor I&O, weight, and lab values if indicated  - Obtain nutrition services referral as needed  Outcome: Progressing     Problem: METABOLIC, FLUID AND ELECTROLYTES - ADULT  Goal: Fluid balance maintained  Description: INTERVENTIONS:  - Monitor labs   - Monitor I/O and WT  - Instruct patient on fluid and nutrition as appropriate  - Assess for signs & symptoms of volume excess or deficit  Outcome: Progressing     Problem: DISCHARGE PLANNING  Goal: Discharge to home or other facility with appropriate resources  Description: INTERVENTIONS:  - Identify barriers to discharge w/patient and caregiver  - Arrange for needed discharge resources and transportation as appropriate  - Identify discharge learning needs (meds, wound care, etc.)  - Arrange for interpretive services to assist at discharge as needed  - Refer to Case Management Department for coordinating discharge planning if the patient needs post-hospital services based on physician/advanced practitioner order or complex needs related to functional status, cognitive ability, or social support system  Outcome: Progressing     Problem:  Nutrition/Hydration-ADULT  Goal: Nutrient/Hydration intake appropriate for improving, restoring or maintaining nutritional needs  Description: Monitor and assess patient's nutrition/hydration status for malnutrition. Collaborate with interdisciplinary team and initiate plan and interventions as ordered.  Monitor patient's weight and dietary intake as ordered or per policy. Utilize nutrition screening tool and intervene as necessary. Determine patient's food preferences and provide high-protein, high-caloric foods as appropriate.     INTERVENTIONS:  - Monitor oral intake, urinary output, labs, and treatment plans  - Assess nutrition and hydration status and recommend course of action  - Evaluate amount of meals eaten  - Assist patient with eating if necessary   - Allow adequate time for meals  - Recommend/ encourage appropriate diets, oral nutritional supplements, and vitamin/mineral supplements  - Order, calculate, and assess calorie counts as needed  - Recommend, monitor, and adjust tube feedings and TPN/PPN based on assessed needs  - Assess need for intravenous fluids  - Provide specific nutrition/hydration education as appropriate  - Include patient/family/caregiver in decisions related to nutrition  Outcome: Progressing

## 2024-02-29 PROCEDURE — 99232 SBSQ HOSP IP/OBS MODERATE 35: CPT | Performed by: INTERNAL MEDICINE

## 2024-02-29 RX ADMIN — HYDROMORPHONE HYDROCHLORIDE 0.5 MG: 1 INJECTION, SOLUTION INTRAMUSCULAR; INTRAVENOUS; SUBCUTANEOUS at 13:50

## 2024-02-29 RX ADMIN — MIRTAZAPINE 15 MG: 15 TABLET, FILM COATED ORAL at 21:59

## 2024-02-29 RX ADMIN — SODIUM CHLORIDE, SODIUM GLUCONATE, SODIUM ACETATE, POTASSIUM CHLORIDE, MAGNESIUM CHLORIDE, SODIUM PHOSPHATE, DIBASIC, AND POTASSIUM PHOSPHATE 100 ML/HR: .53; .5; .37; .037; .03; .012; .00082 INJECTION, SOLUTION INTRAVENOUS at 22:22

## 2024-02-29 RX ADMIN — DIPHENHYDRAMINE HYDROCHLORIDE 50 MG: 50 INJECTION, SOLUTION INTRAMUSCULAR; INTRAVENOUS at 10:06

## 2024-02-29 RX ADMIN — HYDROXYZINE HYDROCHLORIDE 15 MG: 10 TABLET ORAL at 10:06

## 2024-02-29 RX ADMIN — METHYLPREDNISOLONE SODIUM SUCCINATE 40 MG: 40 INJECTION, POWDER, FOR SOLUTION INTRAMUSCULAR; INTRAVENOUS at 10:06

## 2024-02-29 RX ADMIN — HYDROMORPHONE HYDROCHLORIDE 0.5 MG: 1 INJECTION, SOLUTION INTRAMUSCULAR; INTRAVENOUS; SUBCUTANEOUS at 01:40

## 2024-02-29 RX ADMIN — DIPHENHYDRAMINE HYDROCHLORIDE 50 MG: 50 INJECTION, SOLUTION INTRAMUSCULAR; INTRAVENOUS at 13:50

## 2024-02-29 RX ADMIN — HYDROMORPHONE HYDROCHLORIDE 0.5 MG: 1 INJECTION, SOLUTION INTRAMUSCULAR; INTRAVENOUS; SUBCUTANEOUS at 10:06

## 2024-02-29 RX ADMIN — HYDROMORPHONE HYDROCHLORIDE 0.2 MG: 0.2 INJECTION, SOLUTION INTRAMUSCULAR; INTRAVENOUS; SUBCUTANEOUS at 11:42

## 2024-02-29 RX ADMIN — HYDROMORPHONE HYDROCHLORIDE 0.2 MG: 0.2 INJECTION, SOLUTION INTRAMUSCULAR; INTRAVENOUS; SUBCUTANEOUS at 03:22

## 2024-02-29 RX ADMIN — HYDROMORPHONE HYDROCHLORIDE 0.5 MG: 1 INJECTION, SOLUTION INTRAMUSCULAR; INTRAVENOUS; SUBCUTANEOUS at 05:41

## 2024-02-29 RX ADMIN — SODIUM CHLORIDE, SODIUM GLUCONATE, SODIUM ACETATE, POTASSIUM CHLORIDE, MAGNESIUM CHLORIDE, SODIUM PHOSPHATE, DIBASIC, AND POTASSIUM PHOSPHATE 100 ML/HR: .53; .5; .37; .037; .03; .012; .00082 INJECTION, SOLUTION INTRAVENOUS at 13:45

## 2024-02-29 RX ADMIN — SODIUM CHLORIDE, SODIUM GLUCONATE, SODIUM ACETATE, POTASSIUM CHLORIDE, MAGNESIUM CHLORIDE, SODIUM PHOSPHATE, DIBASIC, AND POTASSIUM PHOSPHATE 100 ML/HR: .53; .5; .37; .037; .03; .012; .00082 INJECTION, SOLUTION INTRAVENOUS at 03:25

## 2024-02-29 RX ADMIN — DIPHENHYDRAMINE HYDROCHLORIDE 50 MG: 50 INJECTION, SOLUTION INTRAMUSCULAR; INTRAVENOUS at 01:41

## 2024-02-29 RX ADMIN — MOMETASONE FUROATE: 1 CREAM TOPICAL at 10:14

## 2024-02-29 RX ADMIN — DIPHENHYDRAMINE HYDROCHLORIDE 50 MG: 50 INJECTION, SOLUTION INTRAMUSCULAR; INTRAVENOUS at 05:41

## 2024-02-29 RX ADMIN — MONTELUKAST 10 MG: 10 TABLET, FILM COATED ORAL at 21:59

## 2024-02-29 RX ADMIN — PANTOPRAZOLE SODIUM 40 MG: 40 TABLET, DELAYED RELEASE ORAL at 16:20

## 2024-02-29 RX ADMIN — HYDROMORPHONE HYDROCHLORIDE 0.5 MG: 1 INJECTION, SOLUTION INTRAMUSCULAR; INTRAVENOUS; SUBCUTANEOUS at 18:04

## 2024-02-29 RX ADMIN — DIPHENHYDRAMINE HYDROCHLORIDE 50 MG: 50 INJECTION, SOLUTION INTRAMUSCULAR; INTRAVENOUS at 21:59

## 2024-02-29 RX ADMIN — DIPHENHYDRAMINE HYDROCHLORIDE 50 MG: 50 INJECTION, SOLUTION INTRAMUSCULAR; INTRAVENOUS at 18:04

## 2024-02-29 RX ADMIN — HYDROMORPHONE HYDROCHLORIDE 0.5 MG: 1 INJECTION, SOLUTION INTRAMUSCULAR; INTRAVENOUS; SUBCUTANEOUS at 21:59

## 2024-02-29 RX ADMIN — HYDROMORPHONE HYDROCHLORIDE 0.2 MG: 0.2 INJECTION, SOLUTION INTRAMUSCULAR; INTRAVENOUS; SUBCUTANEOUS at 16:20

## 2024-02-29 NOTE — PROGRESS NOTES
Critical access hospital  Progress Note  Name: Celine Roa I  MRN: [id number]  Unit/Bed#: [place] [address] -01 I Date of Admission: 2/13/2024   Date of Service: 2/29/2024 I Hospital Day: 16    Assessment/Plan   * Right upper quadrant abdominal pain  Assessment & Plan  Persistent RUQ pain worse with regular food intake  S/p ERCP and sphincterotomy  MRCP with stable findings  Possible biliary dyskinesia with known sphincter of Oddi dysfunction  Possible uncontrolled GERD while off protonix per GI  Protonix restarted  The patient tolerated regular food last evening but is having more pain this morning  GYN input is greatly appreciated.  GI reevaluation is appreciated.      Crohn's colitis (HCC)  Assessment & Plan  History of Crohn's disease on Skyrizi as well as methotrexate  Solumedrol switched to PO prednisone 40 per GI but patient unable to tolerate prednisone tablet   Slow taper per GI      Endometriosis  Assessment & Plan  GYN recommendations are greatly appreciated.  Hopefully, the patient will be able to be discharged tomorrow so that she can start therapy for this as an outpatient.    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HCC)  Assessment & Plan  Corn allergy noted   Multiple drug allergies noted but tolerated zosyn and unasyn  She tolerated lovenox and bentyl which were added recently  Continue PRN benadryl and start topical steroid for rash    Acquired hypothyroidism  Assessment & Plan  Continue cytomel daily    GERD without esophagitis  Assessment & Plan  Stable.   Protonix restarted         The patient has had a difficult course.  She has had persistent abdominal pain.  She is on intravenous steroids for possible Crohn's flare.  She cannot tolerate oral steroids.  The possibility of endometriosis has been raised.  I was hopeful that the patient would be well enough to be discharged today.  GYN plans for medical treatment of endometriosis and the medication involved is available at her outside pharmacy  now.  She has follow-up with Dr. Benito as an outpatient next week.  She also had follow-up for her mast cell activation syndrome at [place] earlier this week but was not able to attend.  Fortunately with her worsening symptoms, discharge was impossible today.      Subjective:  The patient felt much worse last night.  She developed substantial diarrhea.  She has not been able to eat since.  She is taking some liquids.  She had no sign of GI bleeding.  She has had no chest pain or shortness of breath.    Physical Exam:   Temp:  [98.1 °F (36.7 °C)-98.3 °F (36.8 °C)] 98.2 °F (36.8 °C)  HR:  [69-78] 75  Resp:  [20] 20  BP: (102-122)/(69-79) 102/69    Gen: Well-developed, well-nourished, uncomfortable because of abdominal pain.  Neck: Supple.  No lymphadenopathy, goiter, or bruit.  Heart: Regular rhythm.  I heard no murmur, gallop, or rub.  Lungs: Clear to auscultation and percussion.  No wheezing, rales, or rhonchi.  Abd: Soft and diffusely tender.  Bowel sounds are active.  Mild distention is present.  Extremities: No clubbing, cyanosis, or edema.  No calf tenderness.  Neuro: Alert and oriented.  No focal sign.  Skin: Warm and dry.      LABS: No new labs.      VTE Pharmacologic Prophylaxis: Enoxaparin (Lovenox)  VTE Mechanical Prophylaxis: sequential compression device

## 2024-02-29 NOTE — PROGRESS NOTES
The pantoprazole has been converted to Oral per [facility] IV-to-PO Auto-Conversion Protocol for Adults as approved by the Pharmacy and Therapeutics Committee. The patient met all eligible criteria:  1) Age >/= 18 years old   2) Received at least one dose of the IV form   3) Receiving at least one other scheduled oral/enteral medication   4) Tolerating an oral/enteral diet   and did not have any exclusions:   1) Critical care patient   2) Active GI bleed (IF assessing H2RAs or PPIs)   3) Continuous tube feeding (IF assessing cipro, doxycycline, levofloxacin, minocycline, rifampin, or voriconazole)   4) Receiving PO vancomycin (IF assessing metronidazole)   5) Persistent nausea and/or vomiting   6) Ileus or gastrointestinal obstruction   7) Sumi/nasogastric tube set for continuous suction   8) Specific order not to automatically convert to PO (in the order's comments or if discussed in the most recent Infectious Disease or primary team's progress notes).

## 2024-02-29 NOTE — PLAN OF CARE
Problem: PAIN - ADULT  Goal: Verbalizes/displays adequate comfort level or baseline comfort level  Description: Interventions:  - Encourage patient to monitor pain and request assistance  - Assess pain using appropriate pain scale  - Administer analgesics based on type and severity of pain and evaluate response  - Implement non-pharmacological measures as appropriate and evaluate response  - Consider cultural and social influences on pain and pain management  - Notify physician/advanced practitioner if interventions unsuccessful or patient reports new pain  Outcome: Progressing     Problem: Knowledge Deficit  Goal: Patient/family/caregiver demonstrates understanding of disease process, treatment plan, medications, and discharge instructions  Description: Complete learning assessment and assess knowledge base.  Interventions:  - Provide teaching at level of understanding  - Provide teaching via preferred learning methods  Outcome: Progressing     Problem: GASTROINTESTINAL - ADULT  Goal: Minimal or absence of nausea and/or vomiting  Description: INTERVENTIONS:  - Administer IV fluids if ordered to ensure adequate hydration  - Maintain NPO status until nausea and vomiting are resolved  - Nasogastric tube if ordered  - Administer ordered antiemetic medications as needed  - Provide nonpharmacologic comfort measures as appropriate  - Advance diet as tolerated, if ordered  - Consider nutrition services referral to assist patient with adequate nutrition and appropriate food choices  Outcome: Progressing  Goal: Maintains or returns to baseline bowel function  Description: INTERVENTIONS:  - Assess bowel function  - Encourage oral fluids to ensure adequate hydration  - Administer IV fluids if ordered to ensure adequate hydration  - Administer ordered medications as needed  - Encourage mobilization and activity  - Consider nutritional services referral to assist patient with adequate nutrition and appropriate food  choices  Outcome: Progressing  Goal: Maintains adequate nutritional intake  Description: INTERVENTIONS:  - Monitor percentage of each meal consumed  - Identify factors contributing to decreased intake, treat as appropriate  - Assist with meals as needed  - Monitor I&O, weight, and lab values if indicated  - Obtain nutrition services referral as needed  Outcome: Progressing     Problem: METABOLIC, FLUID AND ELECTROLYTES - ADULT  Goal: Fluid balance maintained  Description: INTERVENTIONS:  - Monitor labs   - Monitor I/O and WT  - Instruct patient on fluid and nutrition as appropriate  - Assess for signs & symptoms of volume excess or deficit  Outcome: Progressing     Problem: DISCHARGE PLANNING  Goal: Discharge to home or other facility with appropriate resources  Description: INTERVENTIONS:  - Identify barriers to discharge w/patient and caregiver  - Arrange for needed discharge resources and transportation as appropriate  - Identify discharge learning needs (meds, wound care, etc.)  - Arrange for interpretive services to assist at discharge as needed  - Refer to Case Management Department for coordinating discharge planning if the patient needs post-hospital services based on physician/advanced practitioner order or complex needs related to functional status, cognitive ability, or social support system  Outcome: Progressing

## 2024-03-01 ENCOUNTER — HOSPITAL ENCOUNTER (OUTPATIENT)
Dept: INFUSION CENTER | Facility: HOSPITAL | Age: 49
End: 2024-03-01
Attending: INTERNAL MEDICINE

## 2024-03-01 LAB
ALBUMIN SERPL BCP-MCNC: 3.5 G/DL (ref 3.5–5)
ALP SERPL-CCNC: 81 U/L (ref 34–104)
ALT SERPL W P-5'-P-CCNC: 53 U/L (ref 7–52)
ANION GAP SERPL CALCULATED.3IONS-SCNC: 7 MMOL/L
AST SERPL W P-5'-P-CCNC: 20 U/L (ref 13–39)
BASOPHILS # BLD AUTO: 0.08 THOUSANDS/ÂΜL (ref 0–0.1)
BASOPHILS NFR BLD AUTO: 1 % (ref 0–1)
BILIRUB DIRECT SERPL-MCNC: 0.18 MG/DL (ref 0–0.2)
BILIRUB SERPL-MCNC: 0.63 MG/DL (ref 0.2–1)
BUN SERPL-MCNC: 8 MG/DL (ref 5–25)
CALCIUM SERPL-MCNC: 8.5 MG/DL (ref 8.4–10.2)
CHLORIDE SERPL-SCNC: 105 MMOL/L (ref 96–108)
CO2 SERPL-SCNC: 27 MMOL/L (ref 21–32)
CREAT SERPL-MCNC: 0.64 MG/DL (ref 0.6–1.3)
CRP SERPL QL: 3.2 MG/L
EOSINOPHIL # BLD AUTO: 0.16 THOUSAND/ÂΜL (ref 0–0.61)
EOSINOPHIL NFR BLD AUTO: 1 % (ref 0–6)
ERYTHROCYTE [DISTWIDTH] IN BLOOD BY AUTOMATED COUNT: 14.4 % (ref 11.6–15.1)
GFR SERPL CREATININE-BSD FRML MDRD: 103 ML/MIN/1.73SQ M
GLUCOSE SERPL-MCNC: 80 MG/DL (ref 65–140)
HCT VFR BLD AUTO: 43.9 % (ref 34.8–46.1)
HGB BLD-MCNC: 14.1 G/DL (ref 11.5–15.4)
IMM GRANULOCYTES # BLD AUTO: 0.08 THOUSAND/UL (ref 0–0.2)
IMM GRANULOCYTES NFR BLD AUTO: 1 % (ref 0–2)
LIPASE SERPL-CCNC: 9 U/L (ref 11–82)
LYMPHOCYTES # BLD AUTO: 2.96 THOUSANDS/ÂΜL (ref 0.6–4.47)
LYMPHOCYTES NFR BLD AUTO: 22 % (ref 14–44)
MAGNESIUM SERPL-MCNC: 2.1 MG/DL (ref 1.9–2.7)
MCH RBC QN AUTO: 33.3 PG (ref 26.8–34.3)
MCHC RBC AUTO-ENTMCNC: 32.1 G/DL (ref 31.4–37.4)
MCV RBC AUTO: 104 FL (ref 82–98)
MONOCYTES # BLD AUTO: 0.84 THOUSAND/ÂΜL (ref 0.17–1.22)
MONOCYTES NFR BLD AUTO: 6 % (ref 4–12)
NEUTROPHILS # BLD AUTO: 9.66 THOUSANDS/ÂΜL (ref 1.85–7.62)
NEUTS SEG NFR BLD AUTO: 69 % (ref 43–75)
NRBC BLD AUTO-RTO: 0 /100 WBCS
PLATELET # BLD AUTO: 165 THOUSANDS/UL (ref 149–390)
PMV BLD AUTO: 10.1 FL (ref 8.9–12.7)
POTASSIUM SERPL-SCNC: 3.9 MMOL/L (ref 3.5–5.3)
PROT SERPL-MCNC: 6 G/DL (ref 6.4–8.4)
RBC # BLD AUTO: 4.23 MILLION/UL (ref 3.81–5.12)
SODIUM SERPL-SCNC: 139 MMOL/L (ref 135–147)
WBC # BLD AUTO: 13.78 THOUSAND/UL (ref 4.31–10.16)

## 2024-03-01 PROCEDURE — 85025 COMPLETE CBC W/AUTO DIFF WBC: CPT | Performed by: INTERNAL MEDICINE

## 2024-03-01 PROCEDURE — 86003 ALLG SPEC IGE CRUDE XTRC EA: CPT | Performed by: INTERNAL MEDICINE

## 2024-03-01 PROCEDURE — 80048 BASIC METABOLIC PNL TOTAL CA: CPT | Performed by: INTERNAL MEDICINE

## 2024-03-01 PROCEDURE — 80076 HEPATIC FUNCTION PANEL: CPT | Performed by: INTERNAL MEDICINE

## 2024-03-01 PROCEDURE — 83520 IMMUNOASSAY QUANT NOS NONAB: CPT | Performed by: INTERNAL MEDICINE

## 2024-03-01 PROCEDURE — 99232 SBSQ HOSP IP/OBS MODERATE 35: CPT | Performed by: INTERNAL MEDICINE

## 2024-03-01 PROCEDURE — 83690 ASSAY OF LIPASE: CPT | Performed by: INTERNAL MEDICINE

## 2024-03-01 PROCEDURE — 86140 C-REACTIVE PROTEIN: CPT | Performed by: INTERNAL MEDICINE

## 2024-03-01 PROCEDURE — 83735 ASSAY OF MAGNESIUM: CPT | Performed by: INTERNAL MEDICINE

## 2024-03-01 RX ADMIN — DIPHENHYDRAMINE HYDROCHLORIDE 50 MG: 50 INJECTION, SOLUTION INTRAMUSCULAR; INTRAVENOUS at 06:32

## 2024-03-01 RX ADMIN — ENOXAPARIN SODIUM 40 MG: 40 INJECTION SUBCUTANEOUS at 08:07

## 2024-03-01 RX ADMIN — HYDROMORPHONE HYDROCHLORIDE 0.2 MG: 0.2 INJECTION, SOLUTION INTRAMUSCULAR; INTRAVENOUS; SUBCUTANEOUS at 17:04

## 2024-03-01 RX ADMIN — MOMETASONE FUROATE: 1 CREAM TOPICAL at 08:07

## 2024-03-01 RX ADMIN — SODIUM CHLORIDE, SODIUM GLUCONATE, SODIUM ACETATE, POTASSIUM CHLORIDE, MAGNESIUM CHLORIDE, SODIUM PHOSPHATE, DIBASIC, AND POTASSIUM PHOSPHATE 100 ML/HR: .53; .5; .37; .037; .03; .012; .00082 INJECTION, SOLUTION INTRAVENOUS at 08:07

## 2024-03-01 RX ADMIN — MIRTAZAPINE 15 MG: 15 TABLET, FILM COATED ORAL at 22:41

## 2024-03-01 RX ADMIN — DIPHENHYDRAMINE HYDROCHLORIDE 50 MG: 50 INJECTION, SOLUTION INTRAMUSCULAR; INTRAVENOUS at 15:20

## 2024-03-01 RX ADMIN — HYDROMORPHONE HYDROCHLORIDE 0.5 MG: 1 INJECTION, SOLUTION INTRAMUSCULAR; INTRAVENOUS; SUBCUTANEOUS at 15:18

## 2024-03-01 RX ADMIN — DIPHENHYDRAMINE HYDROCHLORIDE 50 MG: 50 INJECTION, SOLUTION INTRAMUSCULAR; INTRAVENOUS at 10:40

## 2024-03-01 RX ADMIN — SODIUM CHLORIDE, SODIUM GLUCONATE, SODIUM ACETATE, POTASSIUM CHLORIDE, MAGNESIUM CHLORIDE, SODIUM PHOSPHATE, DIBASIC, AND POTASSIUM PHOSPHATE 100 ML/HR: .53; .5; .37; .037; .03; .012; .00082 INJECTION, SOLUTION INTRAVENOUS at 17:55

## 2024-03-01 RX ADMIN — HYDROXYZINE HYDROCHLORIDE 15 MG: 10 TABLET ORAL at 08:07

## 2024-03-01 RX ADMIN — MONTELUKAST 10 MG: 10 TABLET, FILM COATED ORAL at 22:41

## 2024-03-01 RX ADMIN — PANTOPRAZOLE SODIUM 40 MG: 40 TABLET, DELAYED RELEASE ORAL at 15:18

## 2024-03-01 RX ADMIN — HYDROMORPHONE HYDROCHLORIDE 0.5 MG: 1 INJECTION, SOLUTION INTRAMUSCULAR; INTRAVENOUS; SUBCUTANEOUS at 20:01

## 2024-03-01 RX ADMIN — HYDROMORPHONE HYDROCHLORIDE 0.2 MG: 0.2 INJECTION, SOLUTION INTRAMUSCULAR; INTRAVENOUS; SUBCUTANEOUS at 11:46

## 2024-03-01 RX ADMIN — ACETAMINOPHEN 325MG 650 MG: 325 TABLET ORAL at 15:00

## 2024-03-01 RX ADMIN — HYDROMORPHONE HYDROCHLORIDE 0.5 MG: 1 INJECTION, SOLUTION INTRAMUSCULAR; INTRAVENOUS; SUBCUTANEOUS at 02:28

## 2024-03-01 RX ADMIN — HYDROMORPHONE HYDROCHLORIDE 0.2 MG: 0.2 INJECTION, SOLUTION INTRAMUSCULAR; INTRAVENOUS; SUBCUTANEOUS at 00:08

## 2024-03-01 RX ADMIN — HYDROMORPHONE HYDROCHLORIDE 0.5 MG: 1 INJECTION, SOLUTION INTRAMUSCULAR; INTRAVENOUS; SUBCUTANEOUS at 06:32

## 2024-03-01 RX ADMIN — DIPHENHYDRAMINE HYDROCHLORIDE 50 MG: 50 INJECTION, SOLUTION INTRAMUSCULAR; INTRAVENOUS at 20:01

## 2024-03-01 RX ADMIN — DIPHENHYDRAMINE HYDROCHLORIDE 50 MG: 50 INJECTION, SOLUTION INTRAMUSCULAR; INTRAVENOUS at 02:29

## 2024-03-01 RX ADMIN — HYDROMORPHONE HYDROCHLORIDE 0.5 MG: 1 INJECTION, SOLUTION INTRAMUSCULAR; INTRAVENOUS; SUBCUTANEOUS at 10:40

## 2024-03-01 RX ADMIN — ACETAMINOPHEN 325MG 650 MG: 325 TABLET ORAL at 22:41

## 2024-03-01 RX ADMIN — METHYLPREDNISOLONE SODIUM SUCCINATE 40 MG: 40 INJECTION, POWDER, FOR SOLUTION INTRAMUSCULAR; INTRAVENOUS at 08:07

## 2024-03-01 NOTE — PROGRESS NOTES
Progress Note- Celine Roa 49 y.o. 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      She is s/p cholecystectomy and was previously diagnosed with SOD.  She has chronic intra- and extrahepatic biliary dilatation with normal LFTs and had ERCP with sphincterotomy and balloon sweeps on 2/14/2024. This was followed by Klebsiella bacteremia, for which she has now completed antibiotic therapy.    Her Crohn's was mild and focal when last assessed by flex sig in 9/2023.  Calprotectin in normal, CT normal, and she is not having diarrhea.  She is on Skyrizi.    She has chronic pain and receives outpatient IV hydration and IV steroids as needed.    She was evaluated by GYN during this admission for a remote history of endometriosis and is planning to start leuprolide as an outpatient, although it is questionable whether her symptoms can be explained by endometriosis.  Today, she is having worsening abdominal pain, which is diffuse.      We will check LFTs, which have not been checked since 2/24.  Also check lipase.  Check CRP, which has been persistently and mildly elevated.  I will order corn IgE to confirm her allergy.  Restart dicyclomine 20 mg bid prn pain.  Consider repeat CT based on above labs and clinical status.  7.   Check tryptase.  ______________________________________________________________________    Subjective:  Complaining of diffuse abdominal pain.  Pain is constant, worse with food, does not feel like a Crohn's flare.  No diarrhea; had soft BM yesterday.  Did not feel like she was ready to go home yesterday because of the pain.        Medication  Administration - last 24 hours from 02/29/2024 1138 to 03/01/2024 1138         Date/Time Order Dose Route Action Action by     03/01/2024 0807 EST hydrOXYzine HCL (ATARAX) tablet 15 mg 15 mg Oral Given Madeline Denny, ROLLY     02/29/2024 2159 EST mirtazapine (REMERON) tablet 15 mg 15 mg Oral Given Elizabeth Dunn RN     02/29/2024 2159 EST montelukast (SINGULAIR) tablet 10 mg 10 mg Oral Given Elizabeth Dunn RN     03/01/2024 1040 EST diphenhydrAMINE (BENADRYL) injection 50 mg 50 mg Intravenous Given Madeline Denny RN     03/01/2024 0632 EST diphenhydrAMINE (BENADRYL) injection 50 mg 50 mg Intravenous Given Elizabeth Dunn RN     03/01/2024 0229 EST diphenhydrAMINE (BENADRYL) injection 50 mg 50 mg Intravenous Given Elizabeth Dunn RN     02/29/2024 2159 EST diphenhydrAMINE (BENADRYL) injection 50 mg 50 mg Intravenous Given Elizabeth Dunn RN     02/29/2024 1804 EST diphenhydrAMINE (BENADRYL) injection 50 mg 50 mg Intravenous Given Colette Rosenberg RN     02/29/2024 1350 EST diphenhydrAMINE (BENADRYL) injection 50 mg 50 mg Intravenous Given Colette Rosenberg RN     03/01/2024 0542 EST acetaminophen (TYLENOL) tablet 650 mg 650 mg Oral Not Given Elizabeth Dunn RN     02/29/2024 2156 EST acetaminophen (TYLENOL) tablet 650 mg 650 mg Oral Not Given Elizabeth Dunn RN     02/29/2024 1359 EST acetaminophen (TYLENOL) tablet 650 mg 650 mg Oral Not Given Colette Rosenberg RN     03/01/2024 0807 EST enoxaparin (LOVENOX) subcutaneous injection 40 mg 40 mg Subcutaneous Given Madeline Denny RN     03/01/2024 0807 EST multi-electrolyte (PLASMALYTE-A/ISOLYTE-S PH 7.4) IV solution 100 mL/hr Intravenous New Bag Madeline Denny, ROLLY     02/29/2024 2222 EST multi-electrolyte (PLASMALYTE-A/ISOLYTE-S PH 7.4) IV solution 100 mL/hr Intravenous New Bag Elizabeth Dunn, ROLLY     02/29/2024 1345 EST multi-electrolyte (PLASMALYTE-A/ISOLYTE-S PH 7.4) IV solution 100 mL/hr Intravenous New Bag Colette Rosenberg, ROLLY     03/01/2024 0008 EST HYDROmorphone HCl  "(DILAUDID) injection 0.2 mg 0.2 mg Intravenous Given Elizabeth Dunn, ROLLY     02/29/2024 1620 EST HYDROmorphone HCl (DILAUDID) injection 0.2 mg 0.2 mg Intravenous Given Colette Rosenberg, ROLLY     02/29/2024 1142 EST HYDROmorphone HCl (DILAUDID) injection 0.2 mg 0.2 mg Intravenous Given Colette Rosenberg RN     03/01/2024 1040 EST HYDROmorphone (DILAUDID) injection 0.5 mg 0.5 mg Intravenous Given Madeline Denny, ROLLY     03/01/2024 0632 EST HYDROmorphone (DILAUDID) injection 0.5 mg 0.5 mg Intravenous Given Elizabeth Dunn, RN     03/01/2024 0228 EST HYDROmorphone (DILAUDID) injection 0.5 mg 0.5 mg Intravenous Given Elizabeth Dunn, RN     02/29/2024 2159 EST HYDROmorphone (DILAUDID) injection 0.5 mg 0.5 mg Intravenous Given Elizabeth Dunn, ROLLY     02/29/2024 1804 EST HYDROmorphone (DILAUDID) injection 0.5 mg 0.5 mg Intravenous Given Colette Rosenberg RN     02/29/2024 1350 EST HYDROmorphone (DILAUDID) injection 0.5 mg 0.5 mg Intravenous Given Colette Rosenberg, ROLLY     03/01/2024 0807 EST methylPREDNISolone sodium succinate (Solu-MEDROL) injection 40 mg 40 mg Intravenous Given Madeline Denny, ROLLY     03/01/2024 0807 EST mometasone (ELOCON) 0.1 % cream -- Topical Given Madeline Denny RN     02/29/2024 1620 EST pantoprazole (PROTONIX) EC tablet 40 mg 40 mg Oral Given Colette Rosenberg RN            Objective:     Vitals: Blood pressure 110/75, pulse 62, temperature 98.6 °F (37 °C), temperature source Oral, resp. rate 16, height 5' 5\" (1.651 m), weight 72.7 kg (160 lb 4.4 oz), SpO2 96%, not currently breastfeeding.,Body mass index is 26.67 kg/m².      Intake/Output Summary (Last 24 hours) at 3/1/2024 1138  Last data filed at 2/29/2024 1345  Gross per 24 hour   Intake 1910 ml   Output --   Net 1910 ml       Physical Exam:   General Appearance: Uncomfortable.  Abdomen: Soft, diffusely ttp    Invasive Devices       Peripheral Intravenous Line  Duration             Peripheral IV 02/28/24 Dorsal (posterior);Right Forearm 1 day              Line  " Duration             Pump Device  -- days                    Lab Results:  No results displayed because visit has over 200 results.          Imaging Studies: I have personally reviewed pertinent imaging studies.

## 2024-03-01 NOTE — ASSESSMENT & PLAN NOTE
Reports decreased urinary output and having to push to urinate  Bladder scans within normal limits, no evidence of hydroureteronephrosis on ultrasound  Evaluated by urology at [facility] and placed on Ditropan  Will need outpatient urodynamic studies as well as discussion on further medical management

## 2024-03-01 NOTE — ASSESSMENT & PLAN NOTE
Prolong hospitalization due to ongoing abdominal pain  Status post ERCP with sphincterotomy; follow-up MRCP has shown stable findings.  Extensive discussions have taken place between gastroenterology team and gynecology.  Some consideration has been given to possibility of endometriosis as contributory to her symptoms, as well as Crohn's flare.  Currently on IV Solu-Medrol for treatment of potential Crohn's flare.  As per gynecology on 3/1: Is to initiate management of endometriosis as an outpatient and no utility in starting while inpatient as time to improvement may be up to 2 weeks.   Continue with supportive care

## 2024-03-01 NOTE — ASSESSMENT & PLAN NOTE
And has had prolonged hospitalization secondary to severe abdominal pain located in the epigastric and right upper quadrant regions..  She has undergone ERCP with sphincterotomy; follow-up MRCP has shown stable findings.  Extensive discussions have taken place between gastroenterology team and gynecology.  Some consideration has been given to possibility of endometriosis as contributory to her symptoms, as well as Crohn's flare.    Will continue on IV Solu-Medrol for treatment of potential Crohn's flare.  Will continue on continue PPI  Discussed with gynecology on 3/1: Is to initiate on relief from the management of endometriosis as an outpatient no utility in starting while inpatient as time to improvement may be up to 2 weeks.  Patient can plan to start this medication on discharge.  Continue with supportive care  Hopeful for discharge in next 1 to 2 days if pain is controlled

## 2024-03-01 NOTE — ASSESSMENT & PLAN NOTE
GYN recommendations are greatly appreciated.  Plan is to initiate on Lupron after discharge for treatment of endometriosis.  Outpatient follow-up scheduled with Dr. Benito

## 2024-03-01 NOTE — PROGRESS NOTES
[facility]  Progress Note  Name: Celine Roa I  MRN: [id number]  Unit/Bed#: [place] [address] -01 I Date of Admission: 2/13/2024   Date of Service: 3/1/2024 I Hospital Day: 17    Assessment/Plan   * Right upper quadrant abdominal pain  Assessment & Plan  And has had prolonged hospitalization secondary to severe abdominal pain located in the epigastric and right upper quadrant regions..  She has undergone ERCP with sphincterotomy; follow-up MRCP has shown stable findings.  Extensive discussions have taken place between gastroenterology team and gynecology.  Some consideration has been given to possibility of endometriosis as contributory to her symptoms, as well as Crohn's flare.    Will continue on IV Solu-Medrol for treatment of potential Crohn's flare.  Will continue on continue PPI  Discussed with gynecology on 3/1: Is to initiate on relief from the management of endometriosis as an outpatient no utility in starting while inpatient as time to improvement may be up to 2 weeks.  Patient can plan to start this medication on discharge.  Continue with supportive care  Hopeful for discharge in next 1 to 2 days if pain is controlled    Crohn's colitis (HCC)  Assessment & Plan  History of Crohn's disease on Skyrizi as well as methotrexate  Solumedrol switched to PO prednisone 40 per GI but patient unable to tolerate prednisone tablet   Continue IV Solu-Medrol for now  Will need outpatient follow-up with GI for IV steroids and IVF infusion.  This has already been done in the past      Endometriosis  Assessment & Plan  GYN recommendations are greatly appreciated.  Plan is to initiate on Lupron after discharge for treatment of endometriosis.  Outpatient follow-up scheduled with Dr. Benito    Bladder spasm  Assessment & Plan  Reports decreased urinary output and having to push to urinate  Bladder scans within normal limits, no evidence of hydroureteronephrosis on ultrasound  Evaluated by urology  at [facility] and placed on Ditropan  Will need outpatient urodynamic studies as well as discussion on further medical management    GERD without esophagitis  Assessment & Plan  Cont PPI    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HCC)  Assessment & Plan  Corn allergy noted   Multiple drug allergies noted but tolerated zosyn and unasyn  She tolerated lovenox and bentyl which were added recently  Continue PRN benadryl and start topical steroid for rash    Acquired hypothyroidism  Assessment & Plan  Continue cytomel daily      VTE Pharmacologic Prophylaxis: VTE Score: 3 Moderate Risk (Score 3-4) - Pharmacological DVT Prophylaxis Ordered: enoxaparin (Lovenox).    Mobility:   Basic Mobility Inpatient Raw Score: 24  JH-HLM Goal: 8: Walk 250 feet or more  JH-HLM Achieved: 8: Walk 250 feet ot more  HLM Goal achieved. Continue to encourage appropriate mobility.    Patient Centered Rounds: I performed bedside rounds with nursing staff today.   Discussions with Specialists or Other Care Team Provider: [facility] Gyn.    Education and Discussions with Family / Patient: Patient declined call to .     Total Time Spent on Date of Encounter in care of patient: 40 mins. This time was spent on one or more of the following: performing physical exam; counseling and coordination of care; obtaining or reviewing history; documenting in the medical record; reviewing/ordering tests, medications or procedures; communicating with other healthcare professionals and discussing with patient's family/caregivers.    Current Length of Stay: 17 day(s)  Current Patient Status: Inpatient   Certification Statement: The patient will continue to require additional inpatient hospital stay due to pain management, clinical observation, medication titration  Discharge Plan: Anticipate discharge in 24-48 hrs to home.    Code Status: Level 1 - Full Code    Subjective:   Patient seen and examined. Still with significant abd pain, mostly in tract and right  upper quadrant. Still have trouble with po intake.     Objective:     Vitals:   Temp (24hrs), Av.4 °F (36.9 °C), Min:98.2 °F (36.8 °C), Max:98.6 °F (37 °C)    Temp:  [98.2 °F (36.8 °C)-98.6 °F (37 °C)] 98.6 °F (37 °C)  HR:  [62-83] 62  Resp:  [16-20] 16  BP: (102-118)/(69-81) 110/75  SpO2:  [96 %-98 %] 96 %  Body mass index is 26.67 kg/m².     Input and Output Summary (last 24 hours):   No intake or output data in the 24 hours ending 24 1535    PHYSICAL EXAM:    Vitals signs reviewed  Constitutional   Awake and cooperative. NAD.   Head/Neck   Normocephalic. Atraumatic.   HEENT   No scleral icterus. EOMI.   Heart   Regular rate and rhythm. No murmers.   Lungs   Clear to auscultation bilaterally. Respirations unlaboured.   Abdomen   Soft. Nontender. Nondistended.  Discomfort to palpation in the epigastric and right upper quadrant region.   Skin   Skin color normal. No rashes.   Extremities   No deformities. No peripheral edema.   Neuro   Alert and oriented. No new deficits.   Psych   Mood stable. Affect normal.       Additional Data:     Labs:  Results from last 7 days   Lab Units 24  0910   WBC Thousand/uL 13.78*   HEMOGLOBIN g/dL 14.1   HEMATOCRIT % 43.9   PLATELETS Thousands/uL 165   NEUTROS PCT % 69   LYMPHS PCT % 22   MONOS PCT % 6   EOS PCT % 1     Results from last 7 days   Lab Units 24  0910   SODIUM mmol/L 139   POTASSIUM mmol/L 3.9   CHLORIDE mmol/L 105   CO2 mmol/L 27   BUN mg/dL 8   CREATININE mg/dL 0.64   ANION GAP mmol/L 7   CALCIUM mg/dL 8.5   ALBUMIN g/dL 3.5   TOTAL BILIRUBIN mg/dL 0.63   ALK PHOS U/L 81   ALT U/L 53*   AST U/L 20   GLUCOSE RANDOM mg/dL 80                       Lines/Drains:  Invasive Devices       Peripheral Intravenous Line  Duration             Peripheral IV 24 Dorsal (posterior);Right Forearm 2 days              Line  Duration             Pump Device  -- days                          Imaging: Reviewed radiology reports from this admission including: MRI  abdomen/MRCP    Recent Cultures (last 7 days):         Last 24 Hours Medication List:   Current Facility-Administered Medications   Medication Dose Route Frequency Provider Last Rate    acetaminophen  650 mg Oral Q8H KARISSA Eagle Babin MD      bisacodyl  5 mg Oral Daily PRN Hair Noonan MD      butalbital-acetaminophen-caffeine  1 tablet Oral Q4H PRN Miles Holm, DO      dicyclomine  20 mg Oral TID PRN Hair Noonan MD      diphenhydrAMINE  50 mg Intravenous Q4H PRN Miles Holm, DO      enoxaparin  40 mg Subcutaneous Q24H [facility] Vipul Partida MD      HYDROmorphone  0.5 mg Intravenous Q4H PRN Merry Rodriguez PA-C      HYDROmorphone  0.2 mg Intravenous Q4H PRN Merry Rodriguez PA-C      hydrOXYzine HCL  15 mg Oral Daily Miles Holm, DO      loperamide  2 mg Oral Q4H PRN Aubrey Ozuna MD      methylPREDNISolone sodium succinate  40 mg Intravenous Daily Merry Rodriguez PA-C      mirtazapine  15 mg Oral HS Miles Holm, DO      mometasone   Topical Daily Aubrey Ozuna MD      montelukast  10 mg Oral HS Miles Holm, DO      multi-electrolyte  100 mL/hr Intravenous Continuous Vipul Partida  mL/hr (03/01/24 0807)    ondansetron  4 mg Intravenous Q6H PRN Miles Holm, DO      oxybutynin  5 mg Oral BID PRN Miles Holm, DO      pantoprazole  40 mg Oral Q24H Aubrey Ozuna MD      trimethobenzamide  200 mg Intramuscular Q6H PRN Tommy Rapp PA-C          Today, Patient Was Seen By: Rhys Ruiz DO    **Please Note: This note may have been constructed using a voice recognition system.**

## 2024-03-01 NOTE — ASSESSMENT & PLAN NOTE
History of Crohn's disease on Skyrizi as well as methotrexate  Solumedrol switched to PO prednisone 40 per GI but patient unable to tolerate prednisone tablet   Continue IV Solu-Medrol for now  Will need outpatient follow-up with GI for IV steroids and IVF infusion.  This has already been done in the past

## 2024-03-01 NOTE — UTILIZATION REVIEW
Continued Stay Review    Date: 2/29                          Current Patient Class: IP  Current Level of Care: MS    HPI:49 y.o. female initially admitted on 2/13     Assessment/Plan:   Pt with persistent abd pain and continues on IV Steroids for poss Crohn's flare, cannot tolerate oral steroids.  Concerns for endometriosis possible and consult with GYN.  Meds sent to pt outside pharmacy and she can follow with Dr. Benito as OP.  Pt did not feel well overnight and anticipated d/c for today was canceled d/t diarrhea, inability to eat, some liquid intake.  No GI Bleed.  On exam abd diffusely tender with normal bowel sounds and mild distention.  On IV fluids. Continues to use significant amounts of IV Dilaudid and Benadryl and PO Dilaudid.      Vital Signs:   Date/Time Temp Pulse Resp BP MAP (mmHg) SpO2 O2 Device Patient Position - Orthostatic VS   03/01/24 08:05:40 98.6 °F (37 °C) 62 16 110/75 87 96 % -- --   02/29/24 2200 -- -- -- -- -- -- None (Room air) --   02/29/24 21:27:15 98.5 °F (36.9 °C) 83 16 118/81 93 98 % -- --   02/29/24 15:47:06 98.2 °F (36.8 °C) 75 20 102/69 80 98 % None (Room air) Lying   02/29/24 05:41:01 98.3 °F (36.8 °C) 69 20 117/79 92 99 % -- --   02/28/24 2301 -- 71 -- -- -- -- -- --   02/28/24 21:10:17 98.1 °F (36.7 °C) 78 20 122/77 92 97 % None (Room air) Lying   02/28/24 2100 -- -- -- -- -- 98 % None (Room air) --   02/28/24 08:13:25 98.7 °F (37.1 °C) 75 18 117/75 89 96 % None (Room air) Lying   02/28/24 05:44:26 98.6 °F (37 °C) 75 20 118/74 89 97 % -- --     Pertinent Labs/Diagnostic Results:           Results from last 7 days   Lab Units 02/24/24  0436   AST U/L 38   ALT U/L 91*   ALK PHOS U/L 117*   TOTAL PROTEIN g/dL 6.2*   ALBUMIN g/dL 3.4*   TOTAL BILIRUBIN mg/dL 0.71   BILIRUBIN DIRECT mg/dL 0.18     Medications:   Scheduled Medications:  acetaminophen, 650 mg, Oral, Q8H KARISSA  enoxaparin, 40 mg, Subcutaneous, Q24H KARISSA  hydrOXYzine HCL, 15 mg, Oral, Daily  methylPREDNISolone sodium  succinate, 40 mg, Intravenous, Daily  mirtazapine, 15 mg, Oral, HS  mometasone, , Topical, Daily  montelukast, 10 mg, Oral, HS  pantoprazole, 40 mg, Oral, Q24H      Continuous IV Infusions:  multi-electrolyte, 100 mL/hr, Intravenous, Continuous      PRN Meds:  bisacodyl, 5 mg, Oral, Daily PRN  butalbital-acetaminophen-caffeine, 1 tablet, Oral, Q4H PRN  dicyclomine, 20 mg, Oral, TID PRN  diphenhydrAMINE, 50 mg, Intravenous, Q4H PRN - x 4 2/28, x 6 2/29  HYDROmorphone, 0.5 mg, Intravenous, Q4H PRN - x 4 2/28, x 6 2/29  HYDROmorphone, 0.2 mg, Intravenous, Q4H PRN - x 3 2/28, x 3 2/29  loperamide, 2 mg, Oral, Q4H PRN  ondansetron, 4 mg, Intravenous, Q6H PRN  oxybutynin, 5 mg, Oral, BID PRN  trimethobenzamide, 200 mg, Intramuscular, Q6H PRN    Discharge Plan: home     Network Utilization Review Department  ATTENTION: Please call with any questions or concerns to [phone] and carefully listen to the prompts so that you are directed to the right person. All voicemails are confidential.   For Discharge needs, contact Care Management DC Support Team at [phone] opt. [phone]  Send all requests for admission clinical reviews, approved or denied determinations and any other requests to dedicated fax number below belonging to the campus where the patient is receiving treatment. List of dedicated fax numbers for the Facilities:  FACILITY NAME UR FAX NUMBER   ADMISSION DENIALS (Administrative/Medical Necessity) [phone]   DISCHARGE SUPPORT TEAM (NETWORK) [phone]   PARENT CHILD HEALTH (Maternity/NICU/Pediatrics) [phone]   [facility] [phone]   [facility] [phone]   [facility] [phone]   [facility] [phone]   Critical access hospital [phone]   [facility] [phone]   [facility] [phone]   [facility]  [facility] [phone]   [facility] [phone]   [facility] [phone]   [facility] [phone]   [facility] [phone]

## 2024-03-02 PROCEDURE — 99232 SBSQ HOSP IP/OBS MODERATE 35: CPT | Performed by: FAMILY MEDICINE

## 2024-03-02 RX ORDER — DICYCLOMINE HCL 20 MG
20 TABLET ORAL
Status: DISCONTINUED | OUTPATIENT
Start: 2024-03-02 | End: 2024-03-07 | Stop reason: HOSPADM

## 2024-03-02 RX ORDER — METHYLPREDNISOLONE SODIUM SUCCINATE 40 MG/ML
20 INJECTION, POWDER, LYOPHILIZED, FOR SOLUTION INTRAMUSCULAR; INTRAVENOUS DAILY
Status: DISCONTINUED | OUTPATIENT
Start: 2024-03-03 | End: 2024-03-03

## 2024-03-02 RX ORDER — MONTELUKAST SODIUM 10 MG/1
10 TABLET ORAL
Status: DISCONTINUED | OUTPATIENT
Start: 2024-03-02 | End: 2024-03-07 | Stop reason: HOSPADM

## 2024-03-02 RX ADMIN — SODIUM CHLORIDE, SODIUM GLUCONATE, SODIUM ACETATE, POTASSIUM CHLORIDE, MAGNESIUM CHLORIDE, SODIUM PHOSPHATE, DIBASIC, AND POTASSIUM PHOSPHATE 100 ML/HR: .53; .5; .37; .037; .03; .012; .00082 INJECTION, SOLUTION INTRAVENOUS at 23:41

## 2024-03-02 RX ADMIN — ONDANSETRON 4 MG: 2 INJECTION INTRAMUSCULAR; INTRAVENOUS at 17:07

## 2024-03-02 RX ADMIN — HYDROMORPHONE HYDROCHLORIDE 0.5 MG: 1 INJECTION, SOLUTION INTRAMUSCULAR; INTRAVENOUS; SUBCUTANEOUS at 00:21

## 2024-03-02 RX ADMIN — DIPHENHYDRAMINE HYDROCHLORIDE 50 MG: 50 INJECTION, SOLUTION INTRAMUSCULAR; INTRAVENOUS at 17:08

## 2024-03-02 RX ADMIN — MIRTAZAPINE 15 MG: 15 TABLET, FILM COATED ORAL at 21:31

## 2024-03-02 RX ADMIN — HYDROMORPHONE HYDROCHLORIDE 0.2 MG: 0.2 INJECTION, SOLUTION INTRAMUSCULAR; INTRAVENOUS; SUBCUTANEOUS at 23:40

## 2024-03-02 RX ADMIN — ENOXAPARIN SODIUM 40 MG: 40 INJECTION SUBCUTANEOUS at 09:03

## 2024-03-02 RX ADMIN — METHYLPREDNISOLONE SODIUM SUCCINATE 40 MG: 40 INJECTION, POWDER, FOR SOLUTION INTRAMUSCULAR; INTRAVENOUS at 09:03

## 2024-03-02 RX ADMIN — DICYCLOMINE HYDROCHLORIDE 20 MG: 20 TABLET ORAL at 15:56

## 2024-03-02 RX ADMIN — DIPHENHYDRAMINE HYDROCHLORIDE 50 MG: 50 INJECTION, SOLUTION INTRAMUSCULAR; INTRAVENOUS at 13:12

## 2024-03-02 RX ADMIN — ACETAMINOPHEN 325MG 650 MG: 325 TABLET ORAL at 15:00

## 2024-03-02 RX ADMIN — MONTELUKAST SODIUM 10 MG: 10 TABLET, FILM COATED ORAL at 21:31

## 2024-03-02 RX ADMIN — DIPHENHYDRAMINE HYDROCHLORIDE 50 MG: 50 INJECTION, SOLUTION INTRAMUSCULAR; INTRAVENOUS at 21:32

## 2024-03-02 RX ADMIN — DIPHENHYDRAMINE HYDROCHLORIDE 50 MG: 50 INJECTION, SOLUTION INTRAMUSCULAR; INTRAVENOUS at 04:30

## 2024-03-02 RX ADMIN — SODIUM CHLORIDE, SODIUM GLUCONATE, SODIUM ACETATE, POTASSIUM CHLORIDE, MAGNESIUM CHLORIDE, SODIUM PHOSPHATE, DIBASIC, AND POTASSIUM PHOSPHATE 100 ML/HR: .53; .5; .37; .037; .03; .012; .00082 INJECTION, SOLUTION INTRAVENOUS at 13:12

## 2024-03-02 RX ADMIN — DIPHENHYDRAMINE HYDROCHLORIDE 50 MG: 50 INJECTION, SOLUTION INTRAMUSCULAR; INTRAVENOUS at 00:21

## 2024-03-02 RX ADMIN — ACETAMINOPHEN 325MG 650 MG: 325 TABLET ORAL at 21:31

## 2024-03-02 RX ADMIN — SODIUM CHLORIDE, SODIUM GLUCONATE, SODIUM ACETATE, POTASSIUM CHLORIDE, MAGNESIUM CHLORIDE, SODIUM PHOSPHATE, DIBASIC, AND POTASSIUM PHOSPHATE 100 ML/HR: .53; .5; .37; .037; .03; .012; .00082 INJECTION, SOLUTION INTRAVENOUS at 03:52

## 2024-03-02 RX ADMIN — HYDROXYZINE HYDROCHLORIDE 15 MG: 10 TABLET ORAL at 09:03

## 2024-03-02 RX ADMIN — DIPHENHYDRAMINE HYDROCHLORIDE 50 MG: 50 INJECTION, SOLUTION INTRAMUSCULAR; INTRAVENOUS at 09:03

## 2024-03-02 RX ADMIN — MOMETASONE FUROATE: 1 CREAM TOPICAL at 09:03

## 2024-03-02 RX ADMIN — HYDROMORPHONE HYDROCHLORIDE 0.5 MG: 1 INJECTION, SOLUTION INTRAMUSCULAR; INTRAVENOUS; SUBCUTANEOUS at 21:32

## 2024-03-02 RX ADMIN — HYDROMORPHONE HYDROCHLORIDE 0.2 MG: 0.2 INJECTION, SOLUTION INTRAMUSCULAR; INTRAVENOUS; SUBCUTANEOUS at 17:35

## 2024-03-02 RX ADMIN — HYDROMORPHONE HYDROCHLORIDE 0.5 MG: 1 INJECTION, SOLUTION INTRAMUSCULAR; INTRAVENOUS; SUBCUTANEOUS at 17:07

## 2024-03-02 RX ADMIN — PANTOPRAZOLE SODIUM 40 MG: 40 TABLET, DELAYED RELEASE ORAL at 15:56

## 2024-03-02 RX ADMIN — HYDROMORPHONE HYDROCHLORIDE 0.5 MG: 1 INJECTION, SOLUTION INTRAMUSCULAR; INTRAVENOUS; SUBCUTANEOUS at 09:03

## 2024-03-02 RX ADMIN — HYDROMORPHONE HYDROCHLORIDE 0.5 MG: 1 INJECTION, SOLUTION INTRAMUSCULAR; INTRAVENOUS; SUBCUTANEOUS at 13:11

## 2024-03-02 RX ADMIN — HYDROMORPHONE HYDROCHLORIDE 0.5 MG: 1 INJECTION, SOLUTION INTRAMUSCULAR; INTRAVENOUS; SUBCUTANEOUS at 04:30

## 2024-03-02 NOTE — ASSESSMENT & PLAN NOTE
GYN signed off.  Plan is to initiate on Leuprolide after discharge for treatment of endometriosis.  Outpatient follow-up scheduled with Dr. Benito

## 2024-03-02 NOTE — ASSESSMENT & PLAN NOTE
History of Crohn's disease on Skyrizi as well as methotrexate  Continue IV Solu-Medrol for now, GI following  Will need outpatient follow-up with GI for IV steroids and IVF infusion.  This has already been done in the past

## 2024-03-02 NOTE — PROGRESS NOTES
"Patient Name: Celine Roa  Patient MRN: [id number]  Date: 03/02/24  Service: [facility]    Subjective   Patient with Crohn's on on Skyrizi and methotrexate.  She is status post cholecystectomy and had worsening abdominal pain.  She underwent an ERCP with sphincterectomy and balloon sweeps 2/14/2024 followed by Klebsiella bacteremia now completed antibiotic therapy.  Today is hospital day 18    Patient seen and examined.  Complains of abdominal pain worse in the right upper quadrant.  She says her pain is a \"comfortable 7\" out of 10 . Also complains of abdominal distention.  She says the pain gets worse after eating and with a bowel movement.  She says she has had 2 bowel movements in the past 24 hours, soft, no diarrhea.  No blood or melena.  Some nausea but no vomiting . no fevers chills.  No chest pain shortness of breath or palpitations.    PMH significant for mast cell activation syndrome, coronary allergy many food and drug sensitivities allergies, history of endometriosis, Hashimoto's, bladder spasms, GERD      Vitals  Blood pressure 107/69, pulse 85, temperature 98.6 °F (37 °C), temperature source Oral, resp. rate 16, height 5' 5\" (1.651 m), weight 72.5 kg (159 lb 13.3 oz), SpO2 97%, not currently breastfeeding.  Exam  General: Alert, no apparent distress  Eyes: No scleral icterus, EOM's intact.  ENT: MMM  Card: RRR, no murmurs  Lungs: Clear to ascultation b/l. No wheezes, rales, rhonchi  Abdomen: Soft.  Mildly distended, mild tender to touch.  Bowel sounds normal. No guarding, rebound, masses. Abdo benign  Skin: No jaundice.  Ext: No edema, clubbing, or cyanosis.   Psych:  Normal affect.  Neuro: Awake, alert and oriented x3      Laboratory Studies  Results from last 7 days   Lab Units 03/01/24  0910   WBC Thousand/uL 13.78*   HEMOGLOBIN g/dL 14.1   HEMATOCRIT % 43.9   PLATELETS Thousands/uL 165     Results from last 7 days   Lab Units 03/01/24  0910   POTASSIUM mmol/L 3.9   CHLORIDE mmol/L " 105   CO2 mmol/L 27   BUN mg/dL 8   CREATININE mg/dL 0.64   CALCIUM mg/dL 8.5   ALK PHOS U/L 81   ALT U/L 53*   AST U/L 20     CRP 3.2  Lipase 9  IgE corn allergy pending  Tryptase pending    Imaging and Other Studies      Inhouse Medications     Current Facility-Administered Medications:     acetaminophen (TYLENOL) tablet 650 mg, 650 mg, Oral, Q8H KARISSA, 650 mg at 03/01/24 2241    bisacodyl (DULCOLAX) EC tablet 5 mg, 5 mg, Oral, Daily PRN    butalbital-acetaminophen-caffeine (FIORICET,ESGIC) -40 mg per tablet 1 tablet, 1 tablet, Oral, Q4H PRN, 1 tablet at 02/23/24 1347    dicyclomine (BENTYL) tablet 20 mg, 20 mg, Oral, TID PRN    diphenhydrAMINE (BENADRYL) injection 50 mg, 50 mg, Intravenous, Q4H PRN, 50 mg at 03/02/24 0903    enoxaparin (LOVENOX) subcutaneous injection 40 mg, 40 mg, Subcutaneous, Q24H KARISSA, 40 mg at 03/02/24 0903    HYDROmorphone (DILAUDID) injection 0.5 mg, 0.5 mg, Intravenous, Q4H PRN, 0.5 mg at 03/02/24 0903    HYDROmorphone HCl (DILAUDID) injection 0.2 mg, 0.2 mg, Intravenous, Q4H PRN, 0.2 mg at 03/01/24 1704    hydrOXYzine HCL (ATARAX) tablet 15 mg, 15 mg, Oral, Daily, 15 mg at 03/02/24 0903    loperamide (IMODIUM) capsule 2 mg, 2 mg, Oral, Q4H PRN, 2 mg at 02/28/24 1716    methylPREDNISolone sodium succinate (Solu-MEDROL) injection 40 mg, 40 mg, Intravenous, Daily, 40 mg at 03/02/24 0903    mirtazapine (REMERON) tablet 15 mg, 15 mg, Oral, HS, 15 mg at 03/01/24 2241    mometasone (ELOCON) 0.1 % cream, , Topical, Daily, Given at 03/02/24 0903    montelukast (SINGULAIR) tablet 10 mg, 10 mg, Oral, HS    multi-electrolyte (PLASMALYTE-A/ISOLYTE-S PH 7.4) IV solution, 100 mL/hr, Intravenous, Continuous, 100 mL/hr at 03/02/24 0352    ondansetron (ZOFRAN) injection 4 mg, 4 mg, Intravenous, Q6H PRN, 4 mg at 02/28/24 1606    oxybutynin (DITROPAN) tablet 5 mg, 5 mg, Oral, BID PRN, 5 mg at 02/20/24 1113    pantoprazole (PROTONIX) EC tablet 40 mg, 40 mg, Oral, Q24H, 40 mg at 03/01/24 8500     trimethobenzamide (TIGAN) IM injection 200 mg, 200 mg, Intramuscular, Q6H PRN, 200 mg at 02/23/24 1954      Assessment/Plan:  Crohn's with abdominal pain -the patient continues to complain of abdominal pain.  Abdomen his mildly diffusely tender, some distention.  But she is having regular bowel movements.  Without any blood or melena.  Her appetite is decreased some nausea but no vomiting.  The patient has Dilaudid as needed she also has Bentyl as needed.   Most recent imaging 2/21 MRI stable.  Fecal calprotectin 2/21 normal at 23. She is on Skyrizi outpatient.  Abdominal pains appears to be multifactorial possible bladder spasms endometriosis contributing.  Patient says mast cell activation syndrome and multiple allergies/food sensitivities possibly contributing as many medications have cornstarch.  Consideration for reimaging if abdomen pain continues to worsen.  Also consideration for pain management consult  GERD-continue PPI    This case and plan will be discussed with Dr. Mae/GI attending who will provide the final recommendations.        Shari Brown PA-C

## 2024-03-02 NOTE — NURSING NOTE
RN sent 13 home prescriptions to the pharmacy for pt. # [id number]. Due to pt's numerous severe allergies, she is requesting to take her home meds. Currently taking 4 of the 13 here in the hospital. If any more home meds are added during this stay, pt would like to use her home meds since she feels confident that they are allergy free.

## 2024-03-02 NOTE — PROGRESS NOTES
[facility]  Progress Note  Name: Celine Roa I  MRN: [id number]  Unit/Bed#: [place] [address] -01 I Date of Admission: 2/13/2024   Date of Service: 3/2/2024 I Hospital Day: 18    Assessment/Plan   * Right upper quadrant abdominal pain  Assessment & Plan  Prolong hospitalization due to ongoing abdominal pain  Status post ERCP with sphincterotomy; follow-up MRCP has shown stable findings.  Extensive discussions have taken place between gastroenterology team and gynecology.  Some consideration has been given to possibility of endometriosis as contributory to her symptoms, as well as Crohn's flare.  Currently on IV Solu-Medrol for treatment of potential Crohn's flare.  As per gynecology on 3/1: Is to initiate management of endometriosis as an outpatient and no utility in starting while inpatient as time to improvement may be up to 2 weeks.   Continue with supportive care    Endometriosis  Assessment & Plan  GYN signed off.  Plan is to initiate on Leuprolide after discharge for treatment of endometriosis.  Outpatient follow-up scheduled with Dr. Benito    Bladder spasm  Assessment & Plan  Bladder scans within normal limits, no evidence of hydroureteronephrosis on ultrasound  Evaluated by urology at [facility] and placed on Ditropan  Will need outpatient urodynamic studies as well as discussion on further medical management    GERD without esophagitis  Assessment & Plan  Currently on PPI    Crohn's colitis (HCC)  Assessment & Plan  History of Crohn's disease on Skyrizi as well as methotrexate  Continue IV Solu-Medrol for now, GI following  Will need outpatient follow-up with GI for IV steroids and IVF infusion.  This has already been done in the past      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HCC)  Assessment & Plan  Corn allergy noted   Multiple drug allergies noted but tolerated zosyn and unasyn  She tolerated lovenox and bentyl which were added recently  Continue PRN benadryl and start topical  steroid for rash               VTE Pharmacologic Prophylaxis: VTE Score: 3 Moderate Risk (Score 3-4) - Pharmacological DVT Prophylaxis Ordered: enoxaparin (Lovenox).    Mobility:   Basic Mobility Inpatient Raw Score: 24  JH-HLM Goal: 8: Walk 250 feet or more  JH-HLM Achieved: 8: Walk 250 feet ot more  HLM Goal achieved. Continue to encourage appropriate mobility.    Patient Centered Rounds: I performed bedside rounds with nursing staff today.   Discussions with Specialists or Other Care Team Provider: PADILLA      Total Time Spent on Date of Encounter in care of patient: 35 mins. This time was spent on one or more of the following: performing physical exam; counseling and coordination of care; obtaining or reviewing history; documenting in the medical record; reviewing/ordering tests, medications or procedures; communicating with other healthcare professionals and discussing with patient's family/caregivers.    Current Length of Stay: 18 day(s)  Current Patient Status: Inpatient   Certification Statement: The patient will continue to require additional inpatient hospital stay due to abdominal pain management  Discharge Plan: Anticipate discharge in 24-48 hrs to home.    Code Status: Level 1 - Full Code    Subjective:   Patient seen and examined at bedside.  She reports that her abdominal pain is improved, has tried breakfast yet stating that it provokes the pain, but will try.    Objective:     Vitals:   Temp (24hrs), Av.3 °F (36.8 °C), Min:98 °F (36.7 °C), Max:98.6 °F (37 °C)    Temp:  [98 °F (36.7 °C)-98.6 °F (37 °C)] 98 °F (36.7 °C)  HR:  [] 113  Resp:  [16-18] 18  BP: (107-133)/() 133/111  SpO2:  [95 %-98 %] 98 %  Body mass index is 26.6 kg/m².     Input and Output Summary (last 24 hours):     Intake/Output Summary (Last 24 hours) at 3/2/2024 2574  Last data filed at 3/2/2024 0351  Gross per 24 hour   Intake 950 ml   Output --   Net 950 ml       Physical Exam:   Physical Exam  Vitals reviewed.    Constitutional:       General: She is not in acute distress.  HENT:      Head: Normocephalic and atraumatic.   Cardiovascular:      Rate and Rhythm: Normal rate and regular rhythm.      Heart sounds: No murmur heard.  Pulmonary:      Effort: Pulmonary effort is normal. No respiratory distress.      Breath sounds: Normal breath sounds.   Abdominal:      General: Bowel sounds are normal. There is no distension.      Palpations: Abdomen is soft.      Tenderness: There is abdominal tenderness.   Musculoskeletal:      Cervical back: Neck supple.      Right lower leg: No edema.      Left lower leg: No edema.   Skin:     General: Skin is warm and dry.   Neurological:      Mental Status: She is alert and oriented to person, place, and time.   Psychiatric:         Mood and Affect: Mood normal.         Behavior: Behavior normal.          Additional Data:     Labs:  Results from last 7 days   Lab Units 03/01/24  0910   WBC Thousand/uL 13.78*   HEMOGLOBIN g/dL 14.1   HEMATOCRIT % 43.9   PLATELETS Thousands/uL 165   NEUTROS PCT % 69   LYMPHS PCT % 22   MONOS PCT % 6   EOS PCT % 1     Results from last 7 days   Lab Units 03/01/24  0910   SODIUM mmol/L 139   POTASSIUM mmol/L 3.9   CHLORIDE mmol/L 105   CO2 mmol/L 27   BUN mg/dL 8   CREATININE mg/dL 0.64   ANION GAP mmol/L 7   CALCIUM mg/dL 8.5   ALBUMIN g/dL 3.5   TOTAL BILIRUBIN mg/dL 0.63   ALK PHOS U/L 81   ALT U/L 53*   AST U/L 20   GLUCOSE RANDOM mg/dL 80                       Lines/Drains:  Invasive Devices       Peripheral Intravenous Line  Duration             Peripheral IV 03/01/24 Left;Upper;Ventral (anterior) Arm 1 day              Line  Duration             Pump Device  -- days                          Imaging: Reviewed radiology reports from this admission including: abdominal/pelvic CT and MRI abdomen/MRCP    Recent Cultures (last 7 days):         Last 24 Hours Medication List:   Current Facility-Administered Medications   Medication Dose Route Frequency Provider  Last Rate    acetaminophen  650 mg Oral Q8H [facility] Eagle Babin MD      bisacodyl  5 mg Oral Daily PRN Hair Noonan MD      butalbital-acetaminophen-caffeine  1 tablet Oral Q4H PRN Miles Holm, DO      dicyclomine  20 mg Oral TID AC Lonnie Mae MD      diphenhydrAMINE  50 mg Intravenous Q4H PRN Miles Holm, DO      enoxaparin  40 mg Subcutaneous Q24H [facility] Vipul Partida MD      HYDROmorphone  0.5 mg Intravenous Q4H PRN Merry Rodriguez PA-C      HYDROmorphone  0.2 mg Intravenous Q4H PRN Merry Rodriguez PA-C      hydrOXYzine HCL  15 mg Oral Daily Rhys Ruiz, DO      loperamide  2 mg Oral Q4H PRN Aubrey Ozuna MD      [START ON 3/3/2024] methylPREDNISolone sodium succinate  20 mg Intravenous Daily Lonnie Mae MD      mirtazapine  15 mg Oral HS Adonay Tineo MD      mometasone   Topical Daily Aubrey Ozuna MD      montelukast  10 mg Oral HS Rhys Ruiz, DO      multi-electrolyte  100 mL/hr Intravenous Continuous Vipul Partida  mL/hr (03/02/24 1312)    ondansetron  4 mg Intravenous Q6H PRN Miles Holm, DO      oxybutynin  5 mg Oral BID PRN Miles Holm, DO      pantoprazole  40 mg Oral Q24H Rhys Ruiz, DO      trimethobenzamide  200 mg Intramuscular Q6H PRN Tommy Rapp PA-C          Today, Patient Was Seen By: Claudine Hobson MD    **Please Note: This note may have been constructed using a voice recognition system.**

## 2024-03-02 NOTE — ASSESSMENT & PLAN NOTE
Bladder scans within normal limits, no evidence of hydroureteronephrosis on ultrasound  Evaluated by urology at [facility] and placed on Ditropan  Will need outpatient urodynamic studies as well as discussion on further medical management

## 2024-03-03 ENCOUNTER — APPOINTMENT (INPATIENT)
Dept: RADIOLOGY | Facility: HOSPITAL | Age: 49
DRG: 444 | End: 2024-03-03
Payer: COMMERCIAL

## 2024-03-03 PROCEDURE — 74022 RADEX COMPL AQT ABD SERIES: CPT

## 2024-03-03 PROCEDURE — 99232 SBSQ HOSP IP/OBS MODERATE 35: CPT | Performed by: FAMILY MEDICINE

## 2024-03-03 RX ORDER — METHYLPREDNISOLONE SODIUM SUCCINATE 40 MG/ML
10 INJECTION, POWDER, LYOPHILIZED, FOR SOLUTION INTRAMUSCULAR; INTRAVENOUS DAILY
Status: DISCONTINUED | OUTPATIENT
Start: 2024-03-04 | End: 2024-03-07 | Stop reason: HOSPADM

## 2024-03-03 RX ORDER — FAMOTIDINE 20 MG/1
40 TABLET, FILM COATED ORAL DAILY
Status: DISCONTINUED | OUTPATIENT
Start: 2024-03-03 | End: 2024-03-04

## 2024-03-03 RX ADMIN — DIPHENHYDRAMINE HYDROCHLORIDE 50 MG: 50 INJECTION, SOLUTION INTRAMUSCULAR; INTRAVENOUS at 15:00

## 2024-03-03 RX ADMIN — HYDROMORPHONE HYDROCHLORIDE 0.5 MG: 1 INJECTION, SOLUTION INTRAMUSCULAR; INTRAVENOUS; SUBCUTANEOUS at 02:13

## 2024-03-03 RX ADMIN — DIPHENHYDRAMINE HYDROCHLORIDE 50 MG: 50 INJECTION, SOLUTION INTRAMUSCULAR; INTRAVENOUS at 19:33

## 2024-03-03 RX ADMIN — DIPHENHYDRAMINE HYDROCHLORIDE 50 MG: 50 INJECTION, SOLUTION INTRAMUSCULAR; INTRAVENOUS at 10:38

## 2024-03-03 RX ADMIN — HYDROMORPHONE HYDROCHLORIDE 0.5 MG: 1 INJECTION, SOLUTION INTRAMUSCULAR; INTRAVENOUS; SUBCUTANEOUS at 19:33

## 2024-03-03 RX ADMIN — DICYCLOMINE HYDROCHLORIDE 20 MG: 20 TABLET ORAL at 15:00

## 2024-03-03 RX ADMIN — ENOXAPARIN SODIUM 40 MG: 40 INJECTION SUBCUTANEOUS at 09:24

## 2024-03-03 RX ADMIN — DIPHENHYDRAMINE HYDROCHLORIDE 50 MG: 50 INJECTION, SOLUTION INTRAMUSCULAR; INTRAVENOUS at 02:13

## 2024-03-03 RX ADMIN — HYDROMORPHONE HYDROCHLORIDE 0.5 MG: 1 INJECTION, SOLUTION INTRAMUSCULAR; INTRAVENOUS; SUBCUTANEOUS at 06:40

## 2024-03-03 RX ADMIN — MONTELUKAST SODIUM 10 MG: 10 TABLET, FILM COATED ORAL at 22:08

## 2024-03-03 RX ADMIN — HYDROMORPHONE HYDROCHLORIDE 0.2 MG: 0.2 INJECTION, SOLUTION INTRAMUSCULAR; INTRAVENOUS; SUBCUTANEOUS at 11:55

## 2024-03-03 RX ADMIN — HYDROXYZINE HYDROCHLORIDE 15 MG: 10 TABLET ORAL at 09:24

## 2024-03-03 RX ADMIN — METHYLPREDNISOLONE SODIUM SUCCINATE 20 MG: 40 INJECTION, POWDER, FOR SOLUTION INTRAMUSCULAR; INTRAVENOUS at 09:24

## 2024-03-03 RX ADMIN — ACETAMINOPHEN 325MG 650 MG: 325 TABLET ORAL at 15:00

## 2024-03-03 RX ADMIN — PANTOPRAZOLE SODIUM 40 MG: 40 TABLET, DELAYED RELEASE ORAL at 15:01

## 2024-03-03 RX ADMIN — HYDROMORPHONE HYDROCHLORIDE 0.2 MG: 0.2 INJECTION, SOLUTION INTRAMUSCULAR; INTRAVENOUS; SUBCUTANEOUS at 18:04

## 2024-03-03 RX ADMIN — DICYCLOMINE HYDROCHLORIDE 20 MG: 20 TABLET ORAL at 06:40

## 2024-03-03 RX ADMIN — ACETAMINOPHEN 325MG 650 MG: 325 TABLET ORAL at 22:08

## 2024-03-03 RX ADMIN — HYDROMORPHONE HYDROCHLORIDE 0.5 MG: 1 INJECTION, SOLUTION INTRAMUSCULAR; INTRAVENOUS; SUBCUTANEOUS at 10:38

## 2024-03-03 RX ADMIN — FAMOTIDINE 40 MG: 20 TABLET, FILM COATED ORAL at 10:35

## 2024-03-03 RX ADMIN — HYDROMORPHONE HYDROCHLORIDE 0.5 MG: 1 INJECTION, SOLUTION INTRAMUSCULAR; INTRAVENOUS; SUBCUTANEOUS at 15:01

## 2024-03-03 RX ADMIN — ACETAMINOPHEN 325MG 650 MG: 325 TABLET ORAL at 06:40

## 2024-03-03 RX ADMIN — HYDROMORPHONE HYDROCHLORIDE 0.2 MG: 0.2 INJECTION, SOLUTION INTRAMUSCULAR; INTRAVENOUS; SUBCUTANEOUS at 22:08

## 2024-03-03 RX ADMIN — ONDANSETRON 4 MG: 2 INJECTION INTRAMUSCULAR; INTRAVENOUS at 11:55

## 2024-03-03 RX ADMIN — MIRTAZAPINE 15 MG: 15 TABLET, FILM COATED ORAL at 22:08

## 2024-03-03 RX ADMIN — SODIUM CHLORIDE, SODIUM GLUCONATE, SODIUM ACETATE, POTASSIUM CHLORIDE, MAGNESIUM CHLORIDE, SODIUM PHOSPHATE, DIBASIC, AND POTASSIUM PHOSPHATE 100 ML/HR: .53; .5; .37; .037; .03; .012; .00082 INJECTION, SOLUTION INTRAVENOUS at 09:25

## 2024-03-03 RX ADMIN — DIPHENHYDRAMINE HYDROCHLORIDE 50 MG: 50 INJECTION, SOLUTION INTRAMUSCULAR; INTRAVENOUS at 06:40

## 2024-03-03 RX ADMIN — MOMETASONE FUROATE: 1 CREAM TOPICAL at 09:36

## 2024-03-03 RX ADMIN — SODIUM CHLORIDE, SODIUM GLUCONATE, SODIUM ACETATE, POTASSIUM CHLORIDE, MAGNESIUM CHLORIDE, SODIUM PHOSPHATE, DIBASIC, AND POTASSIUM PHOSPHATE 100 ML/HR: .53; .5; .37; .037; .03; .012; .00082 INJECTION, SOLUTION INTRAVENOUS at 19:33

## 2024-03-03 RX ADMIN — DICYCLOMINE HYDROCHLORIDE 20 MG: 20 TABLET ORAL at 10:35

## 2024-03-03 NOTE — ASSESSMENT & PLAN NOTE
[unfilled]  Non-Surgical Consult    Itzel Oliver Patient Status:  Inpatient    1949 MRN [id number]   Location [facility] SHORT STAY UNIT A Attending Chana Zhu MD   Hosp Day # 1 PCP Edwardo Cabrales MD       Date of Admission:  2023    SUBJECTIVE:    Reason for Admission:  Abdominal pain, distension, reaccumulated ascities    History of Present Illness:  Patient is a 73 year old female with a history of urothelial bladder cancer in  who started having significant abdominal distension and pain with decreased appetite at the end of March. Has presented to the ER several times and found to have malignant ascites on paracentesis 4/3 with unknown primary, gynecologic or renal origin favored. She has not yet been able follow up outpatient before the ascites reaccumulates and causes symptoms, for which she presents to the hospital.     Reports decreased appetite with early satiety, still tolerating small quantities of things like smoothies. No vaginal bleeding. On MRI today she has new complex adnexal masses bilaterally, left larger than right, which were not present in 2021. Last had paracentesis on  which improved her symptoms, and also had IR guided biopsy of left adnexal mass.     No LMP recorded. Patient is postmenopausal.    Medications:  Medications Prior to Admission   Medication Sig Dispense Refill   • HYDROcodone-acetaminophen (NORCO) 5-325 MG per tablet Take 1 tablet by mouth every 6 hours as needed for Pain.     • spironolactone (ALDACTONE) 25 MG tablet Take 1 tablet by mouth daily. Do not start before 2023. 30 tablet 0   • docusate sodium-sennosides (Senna S) 50-8.6 MG per tablet Take 2 tablets by mouth daily as needed for Constipation. 30 tablet 3   • albuterol 108 (90 Base) MCG/ACT inhaler Inhale 1 puff into the lungs every 4 hours as needed for Shortness of Breath or Wheezing. 1 each 1   • [DISCONTINUED] amLODIPine (NORVASC) 5 MG tablet Take 1 tablet by mouth  Currently on PPI   daily. 90 tablet 1       Allergies:  ALLERGIES:   Allergen Reactions   • Contrast Media RASH   • Iodine PRURITUS        Past Medical History:  Past Medical History:   Diagnosis Date   • Essential (primary) hypertension    • High cholesterol    • Lung nodule     bilateral   • Malignant neoplasm (CMD)     Bladder   • Thoracic aortic aneurysm (CMD)        Past Surgical History:  Past Surgical History:   Procedure Laterality Date   • Abdomen surgery     • Cataract extraction w/ intraocular lens  implant, bilateral Bilateral nov 2020, april 2021   • Cystoscopy     • Eye surgery     • Kidney stone surgery     • Lipoma resection     • Tonsillectomy     • Tubal ligation         Past OB History:  OB History   No obstetric history on file.       Past GYN History:   Fibroids, calcified and stable    Social History:  Social History     Tobacco Use   • Smoking status: Never   • Smokeless tobacco: Never   Substance Use Topics   • Alcohol use: Yes     Alcohol/week: 2.0 standard drinks     Types: 2 Glasses of wine per week     Comment: couple times per week.        Review of Systems:   no abnormal bleeding, pelvic pain or discharge, no breast pain or new or enlarging lumps on self exam, no side effects of hormonal medications, no vaginal bleeding, no discharge or pelvic pain, no hot flashes    OBJECTIVE:  Temp:  [97.7 °F (36.5 °C)-98.8 °F (37.1 °C)] 98.8 °F (37.1 °C)  Heart Rate:  [76-95] 76  Resp:  [18-20] 20  BP: (101-129)/(57-83) 109/74    Intake/Output Summary (Last 24 hours) at 4/15/2023 1100  Last data filed at 4/15/2023 0400  Gross per 24 hour   Intake 240 ml   Output --   Net 240 ml       Physical Exam:  General appearance: alert, appears stated age and cooperative  Head: Normocephalic, without obvious abnormality, atraumatic  Eyes: conjunctivae/corneas clear. PERRL, EOM's intact. Fundi benign.  Ears: normal TM's and external ear canals both ears  Throat: lips, mucosa, and tongue normal; teeth and gums normal  Neck: no  adenopathy, no carotid bruit, no JVD, supple, symmetrical, trachea midline and thyroid not enlarged, symmetric, no tenderness/mass/nodules  Back: symmetric, no curvature. ROM normal. No CVA tenderness.  Lungs: clear to auscultation bilaterally  Breasts:  normal appearance, no masses or tenderness  Heart: S1, S2 normal, no murmur, click, rub or gallop, regular rate and rhythm  Abdomen: soft, non-tender; bowel sounds normal; no masses,  no organomegaly, evidence of reaccumulating ascites  Pelvic: Deferred  Extremities: extremities normal, atraumatic, no cyanosis or edema  Skin: Skin color, texture, turgor normal. No rashes or lesions  Lymph nodes: Cervical, supraclavicular, and axillary nodes normal.  Neurologic: Grossly normal    Diagnostics:  Imaging  LAST ECHO/ECHO STRESS:  No valid procedures specified.  LAST MRI:  === 04/12/23 ===    MRI PELVIS W WO CONTRAST    - Narrative -  PROCEDURE: MRI ABDOMEN W WO CONTRAST, MRI PELVIS W WO CONTRAST    HISTORY: Adnexal mass.  Malignant ascites.  History of bladder cancer  status post BCG therapy.    TECHNIQUE: Multiplanar, multisequence MRI of the abdomen and pelvis with  and without contrast. Postcontrast images obtained after intravenous  administration of 10 mL of MultiHance.    COMPARISON: CT abdomen and pelvis 04/02/2023.  MRI pelvis 09/10/2019.    FINDINGS:    Liver: The liver is normal in size and morphology. No focal hepatic lesions  identified. No evidence of hepatic steatosis. The main portal vein is  patent.    Biliary Tract: The gallbladder is present. No biliary ductal dilatation.    Spleen: No abnormalities.    Pancreas: Normal in signal intensity. No focal lesions. No pancreatic  ductal dilatation.    GI Tract & Peritoneum: Diverticulosis coli. Moderate volume of ascites  with peritoneal thickening and enhancement consistent with clinical history  of malignant ascites.    Adrenal Glands: No abnormalities.    Kidneys & Ureters: No hydronephrosis. Nonobstructive  calculus in the  interpolar right kidney measuring approximately 1 cm. Avidly enhancing T2  hyperintense lesion in the upper pole of the left kidney measuring 2.4 x  1.9 cm (1002/81). Subcentimeter T2 hyperintense foci in the right kidney  are too small to characterize.    Bladder: Enhancing soft tissue nodule at midline anterosuperior to the  bladder measuring 2.2 x 1.5 x 1.9 cm (4/19), similar when compared to the  prior MRI pelvis; a prior biopsy was negative for malignancy and  demonstrated lymphoplasmacytic cells. The bladder is underdistended  limiting evaluation. Sessile nodularity along the inner wall of the right  lateral aspect of the bladder measuring 0.6 x 0.4 x 0.7 cm (4/24 and 7/30).      Uterus: Anteverted measuring 7.2 x 2.9 x 4.7 cm. Heterogeneous high T2  intermediate T2 signal intensity of the endometrium measuring 0.8 cm in  thickness.  Enhancing fibroid along the left uterine horn measuring 1.3 x  1.0 cm (6/32).  Hypoenhancing T2 hypointense structures along the posterior  uterine fundus measuring 2.0 x 1.4 cm (4/19) and along the anterior uterine  fundus measuring 0.9 x 0.7 cm (4/18) favored to represent pedunculated  subserosal fibroids.  Preserved low signal intensity of the cervical  stroma. Several small T2 hyperintense foci in the cervix compatible with  nabothian cysts.    Adnexa: Complex mixed solid and cystic enhancing left adnexal mass  measuring 15.1 x 8.1 x 9.6 cm.  There is also a complex solid and cystic  right adnexal mass measuring 4.5 x 4.1 x 3.4 cm (6/33).    Vasculature: No abdominal aortic aneurysm.    Lymph Nodes: No adenopathy.    Body Wall: Degenerative disc disease affects L5-S1.    Other: No additional findings.    - Impression -  1.   Bilateral complex cystic and solid adnexal masses measuring 15.1 cm on  the left and 4.5 cm on the right and the suspicious for primary ovarian  malignancy.  Recommend tissue sampling.  2.   Moderate volume of ascites with peritoneal  thickening and enhancement  consistent with clinical history of malignant ascites.  3.   Sessile nodularity along the anterior wall of the right lateral aspect  of the bladder measuring up to 0.7 cm could represent scarring or  residual/recurrent tumor in the setting of a history of treated bladder  cancer.  Consider cystoscopy.  4.   Enhancing soft tissue nodule anterosuperior to the bladder measuring  2.2 cm.  Stability from September 2019 and prior biopsy negative for  malignancy suggest a benign entity, although the appearance is nonspecific  and the exact etiology is uncertain.  Recommend attention on follow-up.  5.   Left renal lesion measuring 2.4 cm highly suspicious for renal cell  carcinoma (favor clear cell subtype).  6.   Nonobstructive calculus in the right kidney measuring 1 cm.    Electronically Signed by: MARY RAPHAEL M.D.  Signed on: 4/15/2023 6:01 AM  Workstation ID: [id number]    ___________________________________________________________________________    MRI ABDOMEN W WO CONTRAST    - Narrative -  PROCEDURE: MRI ABDOMEN W WO CONTRAST, MRI PELVIS W WO CONTRAST    HISTORY: Adnexal mass.  Malignant ascites.  History of bladder cancer  status post BCG therapy.    TECHNIQUE: Multiplanar, multisequence MRI of the abdomen and pelvis with  and without contrast. Postcontrast images obtained after intravenous  administration of 10 mL of MultiHance.    COMPARISON: CT abdomen and pelvis 04/02/2023.  MRI pelvis 09/10/2019.    FINDINGS:    Liver: The liver is normal in size and morphology. No focal hepatic lesions  identified. No evidence of hepatic steatosis. The main portal vein is  patent.    Biliary Tract: The gallbladder is present. No biliary ductal dilatation.    Spleen: No abnormalities.    Pancreas: Normal in signal intensity. No focal lesions. No pancreatic  ductal dilatation.    GI Tract & Peritoneum: Diverticulosis coli. Moderate volume of ascites  with peritoneal thickening and enhancement  consistent with clinical history  of malignant ascites.    Adrenal Glands: No abnormalities.    Kidneys & Ureters: No hydronephrosis. Nonobstructive calculus in the  interpolar right kidney measuring approximately 1 cm. Avidly enhancing T2  hyperintense lesion in the upper pole of the left kidney measuring 2.4 x  1.9 cm (1002/81). Subcentimeter T2 hyperintense foci in the right kidney  are too small to characterize.    Bladder: Enhancing soft tissue nodule at midline anterosuperior to the  bladder measuring 2.2 x 1.5 x 1.9 cm (4/19), similar when compared to the  prior MRI pelvis; a prior biopsy was negative for malignancy and  demonstrated lymphoplasmacytic cells. The bladder is underdistended  limiting evaluation. Sessile nodularity along the inner wall of the right  lateral aspect of the bladder measuring 0.6 x 0.4 x 0.7 cm (4/24 and 7/30).      Uterus: Anteverted measuring 7.2 x 2.9 x 4.7 cm. Heterogeneous high T2  intermediate T2 signal intensity of the endometrium measuring 0.8 cm in  thickness.  Enhancing fibroid along the left uterine horn measuring 1.3 x  1.0 cm (6/32).  Hypoenhancing T2 hypointense structures along the posterior  uterine fundus measuring 2.0 x 1.4 cm (4/19) and along the anterior uterine  fundus measuring 0.9 x 0.7 cm (4/18) favored to represent pedunculated  subserosal fibroids.  Preserved low signal intensity of the cervical  stroma. Several small T2 hyperintense foci in the cervix compatible with  nabothian cysts.    Adnexa: Complex mixed solid and cystic enhancing left adnexal mass  measuring 15.1 x 8.1 x 9.6 cm.  There is also a complex solid and cystic  right adnexal mass measuring 4.5 x 4.1 x 3.4 cm (6/33).    Vasculature: No abdominal aortic aneurysm.    Lymph Nodes: No adenopathy.    Body Wall: Degenerative disc disease affects L5-S1.    Other: No additional findings.    - Impression -  1.   Bilateral complex cystic and solid adnexal masses measuring 15.1 cm on  the left and 4.5  cm on the right and the suspicious for primary ovarian  malignancy.  Recommend tissue sampling.  2.   Moderate volume of ascites with peritoneal thickening and enhancement  consistent with clinical history of malignant ascites.  3.   Sessile nodularity along the anterior wall of the right lateral aspect  of the bladder measuring up to 0.7 cm could represent scarring or  residual/recurrent tumor in the setting of a history of treated bladder  cancer.  Consider cystoscopy.  4.   Enhancing soft tissue nodule anterosuperior to the bladder measuring  2.2 cm.  Stability from September 2019 and prior biopsy negative for  malignancy suggest a benign entity, although the appearance is nonspecific  and the exact etiology is uncertain.  Recommend attention on follow-up.  5.   Left renal lesion measuring 2.4 cm highly suspicious for renal cell  carcinoma (favor clear cell subtype).  6.   Nonobstructive calculus in the right kidney measuring 1 cm.    Electronically Signed by: MARY RAPHAEL M.D.  Signed on: 4/15/2023 6:01 AM  Workstation ID: [id number]      === 09/10/19 ===    MRI ADVOCATE PROCEDURE    - Narrative -  Accession #    [id number]    PROCEDURE: MRI PELVIS WO/W CON    CLINICAL HISTORY: Pelvic mass.    TECHNIQUE: Multiplanar, multisequence MRI of the pelvis was performed without and with IV  contrast.    Contrast: Patient was administered 7ml of Gadavist without immediate complications.    COMPARISON: CT of the abdomen pelvis dated 08/20/2019.    FINDINGS:    The uterus is present.  The endometrium is prominent measuring up to 7.5 mm in thickness.  There  are small nabothian cysts.  There are small fibroids noted in the uterus some of which appear  pedunculated.  These demonstrate calcifications on the recent CT.    There is a polypoid broad-based lesion in the bladder measuring 2.9 x 2 x 2.2 cm.  There is a  mildly T2 hyperintense lesion is located along the anterior right bladder wall extending to  the superior  aspect of the bladder wall.  It demonstrates postcontrast enhancement.  There is a  rounded enhancing soft tissue lesion along the anterior superior aspect of the bladder measuring  2 cm.  It is separate compared to the recent uterus.    There are two focal subcentimeter lesions demonstrating diffusion restriction in the right iliac  bone.  These have associated T1 hypointensity and demonstrate postcontrast enhancement (8/67,  2/9, 11/22-21).    No enlarged external iliac chain nodes are noted.    - Impression -  The soft tissue lesion noted along the anterior superior aspect of the bladder is favored for  metastatic node.  There is a 3 cm enhancing bladder mass, likely the primary site of malignancy.    Two subcentimeter lesions in the right iliac bone are suspicious for metastatic disease.    Endometrium is mildly thickened measuring up to 7.5 mm.  Endometrial sampling is recommended.    -----  F I N A L  -----    Transcribed By: ANAY  09/11/19 11:00 am    Dictated By:            BUCK GUIDRY    Electronically Reviewed and Approved By:           BUCK GUIDRY  09/11/19 12:04 pm  LAST CT:  === 04/01/23 ===    CTA CHEST    - Narrative -  Patient: DANIELA MYRICK  Time Out: 03:59  Exam(s): CTA CHEST With Contrast IV Amt: OMNI 350/96 ML    EXAM:  CT Angiography Chest With Intravenous Contrast    CLINICAL HISTORY:  Reason for exam: Aortic disease, nontraumatic. Shortness of breath.  Essential (primary) hypertension. Aneurysm of the ascending aorta,  without rupture (CMD). Abnormal electrocardiogram (ECG) (EKG)..  Additional notes: PT C/O SOB    TECHNIQUE:  Axial computed tomographic angiography images of the chest with  intravenous contrast.  Dose reduction techniques were utilized.  MIP reconstructed images were created and reviewed.  Coronal and sagittal reformatted images were created and reviewed.    CONTRAST:  Patient received OMNI 350/96 ML of IV contrast    COMPARISON:  CT chest dated  3/2/23.    FINDINGS:  Pulmonary arteries:  Unremarkable.  No pulmonary embolism.  Aorta:  No acute findings.  No thoracic aortic aneurysm.  Lungs:  Bilateral lower lobe subsegmental atelectasis.  Linear  subsegmental atelectasis at the right upper lobe, right middle lobe, and  left upper lobe.  No mass.  Pleural space:  Trace left pleural effusion.  No pneumothorax.  Heart:  Unremarkable.  No cardiomegaly.  No significant pericardial  effusion.  No evidence of RV dysfunction.  Bones/joints:  Degenerative changes of the thoracic spine.  No acute  fracture.  No dislocation.  Soft tissues:  Unremarkable.  Lymph nodes:  Unremarkable.  No enlarged lymph nodes.  Liver:  Cirrhosis of the liver.  Intraperitoneal space:  Large volume of abdominal ascites.    - Impression -  1.  No pulmonary embolism.  No thoracic aortic dissection.  2.  No acute chest pathology.  3.  Cirrhosis of the liver with large volume of abdominal ascites.        Electronically signed by Todd Dale MD on 04 03 23 at 03:59    ___________________________________________________________________________    CT HEAD WO CONTRAST    - Narrative -  EXAMINATION: Computed tomography (CT) of the head without contrast    HISTORY: Headache    TECHNIQUE: CT of the head was performed without contrast according to  standard protocol.    COMPARISON: No prior study is available for comparison at the time of this  dictation.    FINDINGS:    No acute intra- or extra-axial fluid collections are identified. The  ventricles are of normal size, shape, and morphology. There is mild  cerebral volume loss. The basal cisterns are patent. No mass effect or  midline shift is seen. A tiny chronic cortical infarct is seen in the left  parietal lobe. Periventricular white matter hypoattenuation is indicative  of chronic small vessel ischemic disease. There is vascular calcification  of the carotid siphons.    Other than mild paranasal sinus disease and bilateral cataract  extractions,  the visualized portions of the orbits, paranasal sinuses and mastoids  appear normal. No acute fracture is identified.    - Impression -  No acute intracranial process.    Electronically Signed by: DARIEN REECE MD  Signed on: 4/1/2023 3:40 PM  Workstation ID: [id number]    ___________________________________________________________________________    CT ABDOMEN PELVIS W CONTRAST    - Narrative -  Patient: DANIELA MYRICK  Time Out: 05:45  Exam(s): CT ABDOMEN + PELVIS With Contrast IV Amt: 100ml omni 350    EXAM:  CT Abdomen and Pelvis With Intravenous Contrast    CLINICAL HISTORY:  Reason for exam: Worsening distention, history of malignancy.    TECHNIQUE:  Axial computed tomography images of the abdomen and pelvis with  intravenous contrast.  CTDI is 5.9 mGy and DLP is 295 mGy-cm.  Dose  reduction techniques were utilized.    CONTRAST:  Patient received 100ml omni 350 of IV contrast    COMPARISON:  3/23/23    FINDINGS:    ABDOMEN:  Liver:  Unremarkable.  No mass.  Gallbladder and bile ducts:  Unremarkable.  No calcified stones.  No  ductal dilation.  Pancreas:  Unremarkable.  No mass.  No ductal dilation.  Spleen:  Unremarkable.  No splenomegaly.  Adrenals:  Unremarkable.  No mass.  Kidneys and ureters: There is a solid, enhancing lesion within the  posterior left kidney, measuring up to 2.5 x 2.1 x 2.7 cm (image 55 of  series 4).  Again seen is a large nonobstructing calculus within the  right renal collecting system, measuring up to 13 mm in diameter.  There  is no evidence of acute obstructive uropathy.  Stomach and bowel: No bowel obstruction.  Colonic diverticulosis  without evidence of an acute diverticulitis.    PELVIS:  Appendix:  No findings to suggest acute appendicitis.  Bladder:  Unremarkable.  No mass.  Reproductive: Again seen is a complex, mixed cystic and solid enhancing  lesion within the pelvis, measuring up to 8.7 x 13.5 cm in transaxial  dimensions and up to 10.2 cm in  craniocaudal dimension.  There is an  additional smaller lesion in the right adnexum measuring up to 4.5 cm in  diameter.  Both of these appear grossly unchanged since the prior exam 10  days ago.    ABDOMEN and PELVIS:  Intraperitoneal space: Again seen is moderate free fluid within the  abdomen and pelvis, slightly increased when compared to the prior exam.  No free air.  No definite loculated thick walled fluid collection.  Bones/joints:  No acute fracture.  No dislocation.  Soft tissues:  Unremarkable.  Vasculature:  Unremarkable.  No abdominal aortic aneurysm.  Lymph nodes:  Unremarkable.  No enlarged lymph nodes.    - Impression -  Slight interval increase in the extent of intra-abdominal ascites.  No  free air or loculated intra-abdominal fluid collection.    Again seen is a large, mixed cystic and solid lesion within the lower  abdomen and pelvis, as well as an additional lesion in the right adnexum.  There is a solid lesion within the left kidney.  Findings are most  consistent with malignancy at these sites.    Colonic diverticulosis without evidence of an acute diverticulitis.    Again seen is a nonobstructing right renal calculus.  No evidence of  acute obstructive uropathy.        Electronically signed by Laurent Robert M.D. on 04 02 23 at 05:45    Labs     Last WBC:  WBC (K/mcL)   Date Value   04/14/2023 6.1     LAST HGB:  HGB (g/dL)   Date Value   04/14/2023 14.6     LAST PLT:  PLT (K/mcL)   Date Value   04/14/2023 300     LAST CREATININE:  Creatinine (mg/dL)   Date Value   04/14/2023 0.97 (H)       ASSESSMENT:  Patient is a 73 year old female with recurrent malignant ascites of unknown primary with new complex adnexal masses suspicious for ovarian malignancy, but with concern for possible renal etiology or less likely recurrence of previous urothelial malignancy. Now s/p IR biopsy of the adnexal mass and therapeutic paracentesis with evidence of fluid reaccumulation, although symptomatically still  improved.     PLAN:  1) Follow up results of IR biopsies to establish source of malignancy.  2) Recommend repeat IR paracentesis on Monday or Tuesday for symptomatic relief given evidence that fluid is already re-accumulating. May require serial drainage in IR vs. placement of a  catheter to allow for ongoing drainage.  3) Follow up in outpatient gynecologic oncology clinic to discuss pathology results in the setting of new adnexal masses. If ovarian malignancy confirmed, anticipate neoadjuvant chemotherapy  4) Recommend continued attention to PO intake to avoid significant depletion or malnutrition with ongoing need for paracentesis. Favor intake with calorie-dense foods like smoothies, Boost/Ensure, ice cream, soups, etc. If struggling to maintain intake, appreciate evaluation by nutrition.     Seen and d/w Dr. Osito Garcia  4/15/2023  11:00 AM

## 2024-03-03 NOTE — PROGRESS NOTES
"Patient Name: Celine Roa  Patient MRN: [id number]  Date: 03/03/24  Service: [facility]    Subjective   Patient with Crohn's on on Skyrizi and methotrexate.  Intermittent need for IV fluids and steroids.  Patient under the care of Dr. Abner REYES GI.  She is status post cholecystectomy and had worsening abdominal pain.  This admission she underwent an ERCP with sphincterectomy and balloon sweeps 2/14/2024 followed by Klebsiella bacteremia now completed antibiotic therapy.  Today is hospital day 19.    PMH significant for mast cell activation syndrome, corn allergy & many other food and drug sensitivities/allergies, history of endometriosis, Hashimoto's, bladder spasms, GERD     Patient seen and examined.  She complains of abdominal pain worse with movement, eating, and having a bowel movement.  Yesterday afternoon the patient was walking the halls.  She had sudden abdominal cramping.  She then had some nausea but no vomiting.  She did not have fecal urgency but sometime after she did have a bowel movement which was formed without blood or melena.  She also complains of abdominal distention.    I asked her if she feels she is completely emptying her bowels.  She said it was difficult to say.  But then she does tell me her normal is about 2-4 bowel movements a day, and when she is having a Crohn's flare it is approximately 10-20.  I asked if she feels that the Bentyl is helping with her abdominal pain.  She says that it is helping.     We further discussed her multiple food and drug allergies and mast cell activation syndrome and if she takes an H2 blocker such as famotidine.  She says that she takes 40 mg of famotidine as an outpatient but somehow it fell off her scheduled medications inpatient.      Vitals  Blood pressure 101/65, pulse 62, temperature 98.5 °F (36.9 °C), resp. rate 16, height 5' 5\" (1.651 m), weight 73.1 kg (161 lb 2.5 oz), SpO2 94%, not currently breastfeeding.  Exam  General: " Alert, no apparent distress  Eyes: No scleral icterus, EOM's intact.  ENT: MMM  Card: RRR, no murmurs  Lungs: Clear to ascultation b/l. No wheezes, rales, rhonchi  Abdomen: Soft. Non-tender.  Nondistended. Bowel sounds normal. No guarding, rebound, masses. Abdo benign  Skin: No jaundice.  Ext: No edema, clubbing, or cyanosis.   Psych:  Normal affect.  Neuro: Awake, alert and oriented x3      Laboratory Studies  Results from last 7 days   Lab Units 03/01/24  0910   WBC Thousand/uL 13.78*   HEMOGLOBIN g/dL 14.1   HEMATOCRIT % 43.9   PLATELETS Thousands/uL 165     Results from last 7 days   Lab Units 03/01/24  0910   POTASSIUM mmol/L 3.9   CHLORIDE mmol/L 105   CO2 mmol/L 27   BUN mg/dL 8   CREATININE mg/dL 0.64   CALCIUM mg/dL 8.5   ALK PHOS U/L 81   ALT U/L 53*   AST U/L 20     CRP 3.2  Lipase 9  IgE corn allergy pending  Tryptase pending    Imaging and Other Studies  reviewed    Inhouse Medications     Current Facility-Administered Medications:     acetaminophen (TYLENOL) tablet 650 mg, 650 mg, Oral, Q8H KARISSA, 650 mg at 03/03/24 0640    bisacodyl (DULCOLAX) EC tablet 5 mg, 5 mg, Oral, Daily PRN    butalbital-acetaminophen-caffeine (FIORICET,ESGIC) -40 mg per tablet 1 tablet, 1 tablet, Oral, Q4H PRN, 1 tablet at 02/23/24 1347    dicyclomine (BENTYL) tablet 20 mg, 20 mg, Oral, TID AC, 20 mg at 03/03/24 0640    diphenhydrAMINE (BENADRYL) injection 50 mg, 50 mg, Intravenous, Q4H PRN, 50 mg at 03/03/24 0640    enoxaparin (LOVENOX) subcutaneous injection 40 mg, 40 mg, Subcutaneous, Q24H KARISSA, 40 mg at 03/03/24 0924    HYDROmorphone (DILAUDID) injection 0.5 mg, 0.5 mg, Intravenous, Q4H PRN, 0.5 mg at 03/03/24 0640    HYDROmorphone HCl (DILAUDID) injection 0.2 mg, 0.2 mg, Intravenous, Q4H PRN, 0.2 mg at 03/02/24 2340    hydrOXYzine HCL (ATARAX) tablet 15 mg, 15 mg, Oral, Daily, 15 mg at 03/03/24 0924    loperamide (IMODIUM) capsule 2 mg, 2 mg, Oral, Q4H PRN, 2 mg at 02/28/24 1716    methylPREDNISolone sodium succinate  (Solu-MEDROL) injection 20 mg, 20 mg, Intravenous, Daily, 20 mg at 03/03/24 0924    mirtazapine (REMERON) tablet 15 mg, 15 mg, Oral, HS, 15 mg at 03/02/24 2131    mometasone (ELOCON) 0.1 % cream, , Topical, Daily, Given at 03/03/24 0936    montelukast (SINGULAIR) tablet 10 mg, 10 mg, Oral, HS, 10 mg at 03/02/24 2131    multi-electrolyte (PLASMALYTE-A/ISOLYTE-S PH 7.4) IV solution, 100 mL/hr, Intravenous, Continuous, 100 mL/hr at 03/03/24 0925    ondansetron (ZOFRAN) injection 4 mg, 4 mg, Intravenous, Q6H PRN, 4 mg at 03/02/24 1707    oxybutynin (DITROPAN) tablet 5 mg, 5 mg, Oral, BID PRN, 5 mg at 02/20/24 1113    pantoprazole (PROTONIX) EC tablet 40 mg, 40 mg, Oral, Q24H, 40 mg at 03/02/24 1556    trimethobenzamide (TIGAN) IM injection 200 mg, 200 mg, Intramuscular, Q6H PRN, 200 mg at 02/23/24 1954      Assessment/Plan:  Crohn's with abdominal pain and distention-fecal calprotectin normal.  CRP at 3.2 within normal range.  This does not appear to be a Crohn's flare. we are tapering steroids/Solu-Medrol 20 mg today, 10 mg tomorrow.  However her bowel movements are less than baseline only 1/day versus normal 2-4 and she is having abdominal distention.  Will get an obstruction series to assess stool burden.  It is possible the prolonged hospital stay with limited activity, the opioids and the Bentyl are slowing down the bowels too much and causing the abdominal pain  Te patient is on Skyrizi outpatient.  She follows Dr. Abner REYES GI.  She does have periodic outpatient IV hydration and steroids.   Possible IBS contributing - Bentyl changed to 3 times daily scheduled yesterday for the abdominal cramping instead of prn.  Assess stool burden.  Can consider titration of Bentyl  Mast cell activation syndrome and GERD-the patient has a lot of allergies and food sensitivities.  There is a concern that her corn allergy and medications with cornstarch were contributing to the patient's overall discomfort and itchy rash.   "Added famotidine 40 mg as the patient is on that outpatient for both GERD and mast cell activation syndrome.  Continue PPI  Endometriosis -possible contributing to overall abdominal pain.  Seen by GYN this admission, follow-up outpatient  Bladder spasm -Ditropan    Summary:  Check obstruction series and assess stool burden if contributing to the abdominal pain and distention.  There is been a prolonged hospital stay, and it has been difficult to titrate the patient's medications due to her complicated GI history and other abdomen related etiologies may be contributing as well as medication/allergy sensitivities.  Consider titration of bowel regimen based on obstruction series.  Perhaps a \"bowel reset\" with half a colonoscopy prep. Also consideration for pain management consult given the multiple abdominal etiologies that may be contributing        Shari Brown PA-C   "

## 2024-03-03 NOTE — ASSESSMENT & PLAN NOTE
Bladder scans within normal limits, no evidence of hydroureteronephrosis on ultrasound  Evaluated by urology at [facility] and placed on Ditropan  Will need outpatient urodynamic studies as well as discussion on further medical management

## 2024-03-03 NOTE — ASSESSMENT & PLAN NOTE
History of Crohn's disease on Skyrizi as well as methotrexate  Continue IV Solu-Medrol for now, GI following, will continue taper  Will need outpatient follow-up with GI for IV steroids and IVF infusion.  This has already been done in the past

## 2024-03-03 NOTE — PROGRESS NOTES
[facility]  Progress Note  Name: Celine Roa I  MRN: [id number]  Unit/Bed#: [place] [address] -01 I Date of Admission: 2/13/2024   Date of Service: 3/3/2024 I Hospital Day: 19    Assessment/Plan   * Right upper quadrant abdominal pain  Assessment & Plan  Prolong hospitalization due to ongoing abdominal pain  Status post ERCP with sphincterotomy; follow-up MRCP has shown stable findings.  Extensive discussions have taken place between gastroenterology team and gynecology.  Some consideration has been given to possibility of endometriosis as contributory to her symptoms, as well as Crohn's flare.  Currently on IV Solu-Medrol for treatment of potential Crohn's flare.  As per gynecology on 3/1: Is to initiate management of endometriosis as an outpatient and no utility in starting while inpatient as time to improvement may be up to 2 weeks.   Continue with supportive care    Endometriosis  Assessment & Plan  GYN signed off.  Plan is to initiate on Leuprolide after discharge for treatment of endometriosis.  Outpatient follow-up scheduled with Dr. Benito    Bladder spasm  Assessment & Plan  Bladder scans within normal limits, no evidence of hydroureteronephrosis on ultrasound  Evaluated by urology at [facility] and placed on Ditropan  Will need outpatient urodynamic studies as well as discussion on further medical management    GERD without esophagitis  Assessment & Plan  Currently on PPI    Crohn's colitis (HCC)  Assessment & Plan  History of Crohn's disease on Skyrizi as well as methotrexate  Continue IV Solu-Medrol for now, GI following, will continue taper  Will need outpatient follow-up with GI for IV steroids and IVF infusion.  This has already been done in the past      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HCC)  Assessment & Plan  Corn allergy noted   Multiple drug allergies noted but tolerated zosyn and unasyn  She tolerated lovenox and bentyl which were added recently  Continue PRN  benadryl and start topical steroid for rash    Acquired hypothyroidism  Assessment & Plan  Continue cytomel daily               VTE Pharmacologic Prophylaxis: VTE Score: 3 Moderate Risk (Score 3-4) - Pharmacological DVT Prophylaxis Ordered: enoxaparin (Lovenox).    Mobility:   Basic Mobility Inpatient Raw Score: 24  JH-HLM Goal: 8: Walk 250 feet or more  JH-HLM Achieved: 8: Walk 250 feet ot more  HLM Goal achieved. Continue to encourage appropriate mobility.    Patient Centered Rounds: I performed bedside rounds with nursing staff today.   Discussions with Specialists or Other Care Team Provider: Nursing staff    Education and Discussions with Family / Patient:  Discussed with patient.     Total Time Spent on Date of Encounter in care of patient: 50 mins. This time was spent on one or more of the following: performing physical exam; counseling and coordination of care; obtaining or reviewing history; documenting in the medical record; reviewing/ordering tests, medications or procedures; communicating with other healthcare professionals and discussing with patient's family/caregivers.    Current Length of Stay: 19 day(s)  Current Patient Status: Inpatient   Certification Statement: The patient will continue to require additional inpatient hospital stay due to continued pain, IV steroid taper  Discharge Plan: Anticipate discharge in 48 hrs to home.    Code Status: Level 1 - Full Code    Subjective:   Patient was seen at bedside resting comfortably.  She has been trying to work on advancing her diet.  Still complains of pain 7 out of 10.  She is also trying to ambulate.    Objective:     Vitals:   Temp (24hrs), Av.2 °F (36.8 °C), Min:98 °F (36.7 °C), Max:98.5 °F (36.9 °C)    Temp:  [98 °F (36.7 °C)-98.5 °F (36.9 °C)] 98.5 °F (36.9 °C)  HR:  [] 62  Resp:  [16-18] 16  BP: (101-133)/() 101/65  SpO2:  [89 %-98 %] 94 %  Body mass index is 26.82 kg/m².     Input and Output Summary (last 24 hours):      Intake/Output Summary (Last 24 hours) at 3/3/2024 0956  Last data filed at 3/2/2024 2340  Gross per 24 hour   Intake 950 ml   Output --   Net 950 ml       Physical Exam:   Physical Exam  Vitals reviewed.   Constitutional:       General: She is not in acute distress.     Appearance: Normal appearance. She is well-developed. She is not ill-appearing or toxic-appearing.   HENT:      Head: Normocephalic and atraumatic.      Right Ear: External ear normal.      Left Ear: External ear normal.      Nose: Nose normal.      Mouth/Throat:      Mouth: Mucous membranes are moist.   Eyes:      General: No scleral icterus.        Right eye: No discharge.         Left eye: No discharge.      Extraocular Movements: Extraocular movements intact.      Conjunctiva/sclera: Conjunctivae normal.      Pupils: Pupils are equal, round, and reactive to light.   Cardiovascular:      Rate and Rhythm: Normal rate and regular rhythm.      Pulses: Normal pulses.      Heart sounds: Normal heart sounds. No murmur heard.  Pulmonary:      Effort: Pulmonary effort is normal. No respiratory distress.      Breath sounds: Normal breath sounds.   Abdominal:      General: There is distension.      Palpations: Abdomen is soft. There is no mass.      Tenderness: There is abdominal tenderness.      Hernia: No hernia is present.   Musculoskeletal:         General: No swelling or tenderness. Normal range of motion.      Cervical back: Neck supple.   Skin:     General: Skin is warm and dry.      Capillary Refill: Capillary refill takes less than 2 seconds.      Findings: No rash.   Neurological:      General: No focal deficit present.      Mental Status: She is alert.      Motor: No weakness.      Gait: Gait normal.   Psychiatric:         Mood and Affect: Mood normal.         Behavior: Behavior normal.          Additional Data:     Labs:  Results from last 7 days   Lab Units 03/01/24  0910   WBC Thousand/uL 13.78*   HEMOGLOBIN g/dL 14.1   HEMATOCRIT % 43.9    PLATELETS Thousands/uL 165   NEUTROS PCT % 69   LYMPHS PCT % 22   MONOS PCT % 6   EOS PCT % 1     Results from last 7 days   Lab Units 03/01/24  0910   SODIUM mmol/L 139   POTASSIUM mmol/L 3.9   CHLORIDE mmol/L 105   CO2 mmol/L 27   BUN mg/dL 8   CREATININE mg/dL 0.64   ANION GAP mmol/L 7   CALCIUM mg/dL 8.5   ALBUMIN g/dL 3.5   TOTAL BILIRUBIN mg/dL 0.63   ALK PHOS U/L 81   ALT U/L 53*   AST U/L 20   GLUCOSE RANDOM mg/dL 80                       Lines/Drains:  Invasive Devices       Peripheral Intravenous Line  Duration             Peripheral IV 03/01/24 Left;Upper;Ventral (anterior) Arm 2 days              Line  Duration             Pump Device  -- days                          Imaging: No pertinent imaging reviewed.    Recent Cultures (last 7 days):         Last 24 Hours Medication List:   Current Facility-Administered Medications   Medication Dose Route Frequency Provider Last Rate    acetaminophen  650 mg Oral Q8H [facility] Eagle Babin MD      bisacodyl  5 mg Oral Daily PRN Hair Noonan MD      butalbital-acetaminophen-caffeine  1 tablet Oral Q4H PRN Miles Holm, DO      dicyclomine  20 mg Oral TID AC Lonnie Mae MD      diphenhydrAMINE  50 mg Intravenous Q4H PRN Miles Holm, DO      enoxaparin  40 mg Subcutaneous Q24H [facility] Vipul Partida MD      HYDROmorphone  0.5 mg Intravenous Q4H PRN Merry Rodriguez PA-C      HYDROmorphone  0.2 mg Intravenous Q4H PRN Merry Rodriguez PA-C      hydrOXYzine HCL  15 mg Oral Daily Rhys Ruiz, DO      loperamide  2 mg Oral Q4H PRN Aubrey Ozuna MD      methylPREDNISolone sodium succinate  20 mg Intravenous Daily Lonnie Mae MD      mirtazapine  15 mg Oral HS Adonay Tineo MD      mometasone   Topical Daily Aubrey Ozuna MD      montelukast  10 mg Oral HS Rhys Ruiz,       multi-electrolyte  100 mL/hr Intravenous Continuous Vipul Partida  mL/hr (03/03/24 0925)    ondansetron  4 mg Intravenous Q6H PRN  Miles Holm, DO      oxybutynin  5 mg Oral BID PRN Miles Holm, DO      pantoprazole  40 mg Oral Q24H Rhys Ruiz, DO      trimethobenzamide  200 mg Intramuscular Q6H PRN Tommy Rapp PA-C          Today, Patient Was Seen By: Pedro Peralta MD    **Please Note: This note may have been constructed using a voice recognition system.**

## 2024-03-04 LAB
ALBUMIN SERPL BCP-MCNC: 3.6 G/DL (ref 3.5–5)
ALP SERPL-CCNC: 80 U/L (ref 34–104)
ALT SERPL W P-5'-P-CCNC: 47 U/L (ref 7–52)
ANION GAP SERPL CALCULATED.3IONS-SCNC: 8 MMOL/L
AST SERPL W P-5'-P-CCNC: 20 U/L (ref 13–39)
BASOPHILS # BLD AUTO: 0.1 THOUSANDS/ÂΜL (ref 0–0.1)
BASOPHILS NFR BLD AUTO: 1 % (ref 0–1)
BILIRUB SERPL-MCNC: 0.38 MG/DL (ref 0.2–1)
BUN SERPL-MCNC: 11 MG/DL (ref 5–25)
CALCIUM SERPL-MCNC: 8.2 MG/DL (ref 8.4–10.2)
CHLORIDE SERPL-SCNC: 103 MMOL/L (ref 96–108)
CO2 SERPL-SCNC: 25 MMOL/L (ref 21–32)
CREAT SERPL-MCNC: 0.62 MG/DL (ref 0.6–1.3)
EOSINOPHIL # BLD AUTO: 0.15 THOUSAND/ÂΜL (ref 0–0.61)
EOSINOPHIL NFR BLD AUTO: 1 % (ref 0–6)
ERYTHROCYTE [DISTWIDTH] IN BLOOD BY AUTOMATED COUNT: 14.2 % (ref 11.6–15.1)
GFR SERPL CREATININE-BSD FRML MDRD: 104 ML/MIN/1.73SQ M
GLUCOSE SERPL-MCNC: 91 MG/DL (ref 65–140)
HCT VFR BLD AUTO: 44.1 % (ref 34.8–46.1)
HGB BLD-MCNC: 14.1 G/DL (ref 11.5–15.4)
IMM GRANULOCYTES # BLD AUTO: 0.13 THOUSAND/UL (ref 0–0.2)
IMM GRANULOCYTES NFR BLD AUTO: 1 % (ref 0–2)
LYMPHOCYTES # BLD AUTO: 3.22 THOUSANDS/ÂΜL (ref 0.6–4.47)
LYMPHOCYTES NFR BLD AUTO: 22 % (ref 14–44)
MCH RBC QN AUTO: 33.3 PG (ref 26.8–34.3)
MCHC RBC AUTO-ENTMCNC: 32 G/DL (ref 31.4–37.4)
MCV RBC AUTO: 104 FL (ref 82–98)
MONOCYTES # BLD AUTO: 0.93 THOUSAND/ÂΜL (ref 0.17–1.22)
MONOCYTES NFR BLD AUTO: 6 % (ref 4–12)
NEUTROPHILS # BLD AUTO: 10.28 THOUSANDS/ÂΜL (ref 1.85–7.62)
NEUTS SEG NFR BLD AUTO: 69 % (ref 43–75)
NRBC BLD AUTO-RTO: 0 /100 WBCS
PLATELET # BLD AUTO: 390 THOUSANDS/UL (ref 149–390)
PMV BLD AUTO: 9.6 FL (ref 8.9–12.7)
POTASSIUM SERPL-SCNC: 4 MMOL/L (ref 3.5–5.3)
PROT SERPL-MCNC: 6.1 G/DL (ref 6.4–8.4)
RBC # BLD AUTO: 4.24 MILLION/UL (ref 3.81–5.12)
SODIUM SERPL-SCNC: 136 MMOL/L (ref 135–147)
TRYPTASE SERPL-MCNC: 9 UG/L (ref 2.2–13.2)
WBC # BLD AUTO: 14.81 THOUSAND/UL (ref 4.31–10.16)

## 2024-03-04 PROCEDURE — 99232 SBSQ HOSP IP/OBS MODERATE 35: CPT | Performed by: HOSPITALIST

## 2024-03-04 PROCEDURE — 85025 COMPLETE CBC W/AUTO DIFF WBC: CPT | Performed by: FAMILY MEDICINE

## 2024-03-04 PROCEDURE — 80053 COMPREHEN METABOLIC PANEL: CPT | Performed by: FAMILY MEDICINE

## 2024-03-04 RX ORDER — HYDROMORPHONE HYDROCHLORIDE 2 MG/1
2 TABLET ORAL EVERY 6 HOURS PRN
Status: DISCONTINUED | OUTPATIENT
Start: 2024-03-04 | End: 2024-03-05

## 2024-03-04 RX ORDER — LORAZEPAM 1 MG/1
1 TABLET ORAL ONCE
Status: COMPLETED | OUTPATIENT
Start: 2024-03-04 | End: 2024-03-04

## 2024-03-04 RX ORDER — FAMOTIDINE 20 MG/1
40 TABLET, FILM COATED ORAL 2 TIMES DAILY
Status: DISCONTINUED | OUTPATIENT
Start: 2024-03-04 | End: 2024-03-07 | Stop reason: HOSPADM

## 2024-03-04 RX ADMIN — ENOXAPARIN SODIUM 40 MG: 40 INJECTION SUBCUTANEOUS at 08:31

## 2024-03-04 RX ADMIN — HYDROMORPHONE HYDROCHLORIDE 0.5 MG: 1 INJECTION, SOLUTION INTRAMUSCULAR; INTRAVENOUS; SUBCUTANEOUS at 05:35

## 2024-03-04 RX ADMIN — DIPHENHYDRAMINE HYDROCHLORIDE 50 MG: 50 INJECTION, SOLUTION INTRAMUSCULAR; INTRAVENOUS at 18:12

## 2024-03-04 RX ADMIN — METHYLPREDNISOLONE SODIUM SUCCINATE 10 MG: 40 INJECTION, POWDER, FOR SOLUTION INTRAMUSCULAR; INTRAVENOUS at 08:30

## 2024-03-04 RX ADMIN — SODIUM CHLORIDE, SODIUM GLUCONATE, SODIUM ACETATE, POTASSIUM CHLORIDE, MAGNESIUM CHLORIDE, SODIUM PHOSPHATE, DIBASIC, AND POTASSIUM PHOSPHATE 100 ML/HR: .53; .5; .37; .037; .03; .012; .00082 INJECTION, SOLUTION INTRAVENOUS at 16:30

## 2024-03-04 RX ADMIN — DICYCLOMINE HYDROCHLORIDE 20 MG: 20 TABLET ORAL at 17:07

## 2024-03-04 RX ADMIN — ACETAMINOPHEN 325MG 650 MG: 325 TABLET ORAL at 05:35

## 2024-03-04 RX ADMIN — DICYCLOMINE HYDROCHLORIDE 20 MG: 20 TABLET ORAL at 11:01

## 2024-03-04 RX ADMIN — MOMETASONE FUROATE: 1 CREAM TOPICAL at 08:35

## 2024-03-04 RX ADMIN — DICYCLOMINE HYDROCHLORIDE 20 MG: 20 TABLET ORAL at 05:37

## 2024-03-04 RX ADMIN — HYDROMORPHONE HYDROCHLORIDE 0.2 MG: 0.2 INJECTION, SOLUTION INTRAMUSCULAR; INTRAVENOUS; SUBCUTANEOUS at 22:43

## 2024-03-04 RX ADMIN — HYDROMORPHONE HYDROCHLORIDE 0.5 MG: 1 INJECTION, SOLUTION INTRAMUSCULAR; INTRAVENOUS; SUBCUTANEOUS at 00:55

## 2024-03-04 RX ADMIN — HYDROMORPHONE HYDROCHLORIDE 0.2 MG: 0.2 INJECTION, SOLUTION INTRAMUSCULAR; INTRAVENOUS; SUBCUTANEOUS at 11:01

## 2024-03-04 RX ADMIN — DIPHENHYDRAMINE HYDROCHLORIDE 50 MG: 50 INJECTION, SOLUTION INTRAMUSCULAR; INTRAVENOUS at 09:36

## 2024-03-04 RX ADMIN — HYDROMORPHONE HYDROCHLORIDE 0.2 MG: 0.2 INJECTION, SOLUTION INTRAMUSCULAR; INTRAVENOUS; SUBCUTANEOUS at 17:05

## 2024-03-04 RX ADMIN — DIPHENHYDRAMINE HYDROCHLORIDE 50 MG: 50 INJECTION, SOLUTION INTRAMUSCULAR; INTRAVENOUS at 13:43

## 2024-03-04 RX ADMIN — HYDROMORPHONE HYDROCHLORIDE 0.5 MG: 1 INJECTION, SOLUTION INTRAMUSCULAR; INTRAVENOUS; SUBCUTANEOUS at 09:36

## 2024-03-04 RX ADMIN — ONDANSETRON 4 MG: 2 INJECTION INTRAMUSCULAR; INTRAVENOUS at 18:58

## 2024-03-04 RX ADMIN — HYDROMORPHONE HYDROCHLORIDE 2 MG: 2 TABLET ORAL at 13:43

## 2024-03-04 RX ADMIN — DIPHENHYDRAMINE HYDROCHLORIDE 50 MG: 50 INJECTION, SOLUTION INTRAMUSCULAR; INTRAVENOUS at 00:57

## 2024-03-04 RX ADMIN — FAMOTIDINE 40 MG: 20 TABLET, FILM COATED ORAL at 08:31

## 2024-03-04 RX ADMIN — HYDROXYZINE HYDROCHLORIDE 15 MG: 10 TABLET ORAL at 08:31

## 2024-03-04 RX ADMIN — DIPHENHYDRAMINE HYDROCHLORIDE 50 MG: 50 INJECTION, SOLUTION INTRAMUSCULAR; INTRAVENOUS at 05:35

## 2024-03-04 RX ADMIN — DIPHENHYDRAMINE HYDROCHLORIDE 50 MG: 50 INJECTION, SOLUTION INTRAMUSCULAR; INTRAVENOUS at 22:43

## 2024-03-04 RX ADMIN — SODIUM CHLORIDE, SODIUM GLUCONATE, SODIUM ACETATE, POTASSIUM CHLORIDE, MAGNESIUM CHLORIDE, SODIUM PHOSPHATE, DIBASIC, AND POTASSIUM PHOSPHATE 100 ML/HR: .53; .5; .37; .037; .03; .012; .00082 INJECTION, SOLUTION INTRAVENOUS at 05:38

## 2024-03-04 RX ADMIN — HYDROMORPHONE HYDROCHLORIDE 2 MG: 2 TABLET ORAL at 20:21

## 2024-03-04 RX ADMIN — MIRTAZAPINE 15 MG: 15 TABLET, FILM COATED ORAL at 22:43

## 2024-03-04 RX ADMIN — MONTELUKAST SODIUM 10 MG: 10 TABLET, FILM COATED ORAL at 22:43

## 2024-03-04 RX ADMIN — FAMOTIDINE 40 MG: 20 TABLET, FILM COATED ORAL at 17:07

## 2024-03-04 RX ADMIN — LORAZEPAM 1 MG: 1 TABLET ORAL at 18:53

## 2024-03-04 NOTE — PROGRESS NOTES
[facility]  Progress Note  Name: Celine Roa I  MRN: [id number]  Unit/Bed#: [place] [address] -01 I Date of Admission: 2/13/2024   Date of Service: 3/4/2024 I Hospital Day: 20    Assessment/Plan   * Right upper quadrant abdominal pain  Assessment & Plan  Prolong hospitalization due to ongoing abdominal pain  Status post ERCP with sphincterotomy; follow-up MRCP has shown stable findings.  Extensive discussions have taken place between gastroenterology team and gynecology.  Some consideration has been given to possibility of endometriosis as contributory to her symptoms, as well as Crohn's flare.  Currently on IV Solu-Medrol for treatment of potential Crohn's flare.  As per gynecology on 3/1: Is to initiate management of endometriosis as an outpatient and no utility in starting while inpatient as time to improvement may be up to 2 weeks.     I explained the patient that we need to get her off IV narcotics.  I do not see anything we can acutely treat in the abdomen    Discussed case with gastroenterology    We are going to try oral Dilaudid  She could then follow-up with outpatient pain management and outpatient OB/GYN    Endometriosis  Assessment & Plan  GYN signed off.  Plan is to initiate on Leuprolide after discharge for treatment of endometriosis.  Outpatient follow-up scheduled with Dr. Benito    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HCC)  Assessment & Plan  Corn allergy noted   Multiple drug allergies noted but tolerated zosyn and unasyn  She tolerated lovenox and bentyl which were added recently  Continue PRN benadryl and start topical steroid for rash      No rash seen today.  Spoke with GI, changed protonix to pepcid for antihistamine effect.               Subjective:   She does feel little bit better.  But she thinks it may be because she skips dinner and breakfast.    She is still taking IV Dilaudid every 4 hours for severe pain    She does agree to try oral Dilaudid      Objective:      Vitals:   Temp (24hrs), Av.5 °F (36.9 °C), Min:98.3 °F (36.8 °C), Max:98.8 °F (37.1 °C)    Temp:  [98.3 °F (36.8 °C)-98.8 °F (37.1 °C)] 98.3 °F (36.8 °C)  HR:  [67-78] 67  Resp:  [16] 16  BP: ()/(64) 105/64  SpO2:  [93 %-96 %] 93 %  Body mass index is 26.89 kg/m².     Input and Output Summary (last 24 hours):       Intake/Output Summary (Last 24 hours) at 3/4/2024 1052  Last data filed at 3/4/2024 0538  Gross per 24 hour   Intake 2480 ml   Output --   Net 2480 ml       Physical Exam:     Physical Exam  Vitals and nursing note reviewed.   HENT:      Head: Normocephalic and atraumatic.   Eyes:      Pupils: Pupils are equal, round, and reactive to light.   Cardiovascular:      Rate and Rhythm: Normal rate and regular rhythm.      Heart sounds: No murmur heard.     No friction rub. No gallop.   Pulmonary:      Effort: Pulmonary effort is normal.      Breath sounds: Normal breath sounds. No wheezing or rales.   Abdominal:      General: Bowel sounds are normal.      Palpations: Abdomen is soft.      Tenderness: There is abdominal tenderness (mild RUQ tenderness.).   Musculoskeletal:      Right lower leg: No edema.      Left lower leg: No edema.       .       Additional Data:     Labs:    Results from last 7 days   Lab Units 24  0546   WBC Thousand/uL 14.81*   HEMOGLOBIN g/dL 14.1   HEMATOCRIT % 44.1   PLATELETS Thousands/uL 390   NEUTROS PCT % 69   LYMPHS PCT % 22   MONOS PCT % 6   EOS PCT % 1     Results from last 7 days   Lab Units 24  0546   POTASSIUM mmol/L 4.0   CHLORIDE mmol/L 103   CO2 mmol/L 25   BUN mg/dL 11   CREATININE mg/dL 0.62   CALCIUM mg/dL 8.2*   ALK PHOS U/L 80   ALT U/L 47   AST U/L 20                       * I Have Reviewed All Lab Data     Recent Cultures (last 7 days):             Last 24 Hours Medication List:   Current Facility-Administered Medications   Medication Dose Route Frequency Provider Last Rate    acetaminophen  650 mg Oral Q8H KARISSA [place], MD      bisacodyl   5 mg Oral Daily PRN Hair Noonan MD      butalbital-acetaminophen-caffeine  1 tablet Oral Q4H PRN Miles Holm, DO      dicyclomine  20 mg Oral TID AC Lonnie Mae MD      diphenhydrAMINE  50 mg Intravenous Q4H PRN Miles Holm, DO      enoxaparin  40 mg Subcutaneous Q24H KARISSA Vipul Partida MD      famotidine  40 mg Oral BID Chirag Hanson, DO      HYDROmorphone  2 mg Oral Q6H PRN Chirag Hanson, DO      HYDROmorphone  0.2 mg Intravenous Q4H PRN CASE GalavizC      hydrOXYzine HCL  15 mg Oral Daily Rhys Ruiz, DO      loperamide  2 mg Oral Q4H PRN Aurbey Ozuna MD      methylPREDNISolone sodium succinate  10 mg Intravenous Daily CASE SmartC      mirtazapine  15 mg Oral HS Adonay Tineo MD      mometasone   Topical Daily Aubrey Ozuna MD      montelukast  10 mg Oral HS Rhys Ruiz, DO      multi-electrolyte  100 mL/hr Intravenous Continuous Vipul Partida  mL/hr (03/04/24 0538)    ondansetron  4 mg Intravenous Q6H PRN Miles Holm, DO      oxybutynin  5 mg Oral BID PRN Miles Holm, DO      trimethobenzamide  200 mg Intramuscular Q6H PRN Tommy Rapp PA-C           VTE Pharmacologic Prophylaxis:   Pharmacologic: Enoxaparin (Lovenox)      Current Length of Stay: 20 day(s)    Current Patient Status: Inpatient       Discharge Plan: hopefully home tomorrow.    Code Status: Level 1 - Full Code           Today, Patient Was Seen By: Chirag Hanson DO    ** Please Note: Dictation voice to text software may have been used in the creation of this document. **

## 2024-03-04 NOTE — UTILIZATION REVIEW
"Continued Stay Review    Date: 3/3                          Current Patient Class: IP  Current Level of Care: MS    HPI:49 y.o. female initially admitted on 2/13 with continued abd pain.       Assessment/Plan:   Extended hospitalization for ongoing abd pain.  Pt is on IV Steroids for potential Crohn's flare.  GYN will start Endometriosis work up and tx with Leuprolide after discharge as OP as it could take up to 2 wks if she stayed IP.  Needs OP urodynamics studies.  Will continue with IV steroids and IVF infusion as OP.  Continues on PRN Benadryl and topical steroids for rash. On exam today pain rated 7/10, has abd distention and tenderness.  Is working on advancing diet, ambulating.  Per GI, discussed her multiple food and drug allergies and mast cell activation syndrome and if she takes an H2 blocker such as famotidine.  She says that she takes 40 mg of famotidine as an outpatient but somehow it fell off her scheduled medications inpatient.  Continue steroid taper. Will get obst series for assessing stool burden. Poss component of IBS, changed Bentyl to scheduled dosing rather than PRN.  Restart Famotidine.  Consider titration of bowel regimen based on obstruction series.  Perhaps a \"bowel reset\" with half a colonoscopy prep. Also consideration for pain management consult given the multiple abdominal etiologies that may be contributing.  Using multiple doses daily of IV Benadryl and IV Dilaudid 0.2 and 0.5 mg.  Used IV Zofran x 1 daily 3/2, 3/3.  Obstruction series - WNL.         Vital Signs:   Date/Time Temp Pulse Resp BP MAP (mmHg) SpO2 O2 Device Patient Position - Orthostatic VS   03/04/24 0840 -- -- -- -- -- -- None (Room air) --   03/04/24 06:40:57 98.3 °F (36.8 °C) 67 16 105/64 78 93 % None (Room air) Lying   03/03/24 23:16:54 98.5 °F (36.9 °C) 72 16 104/64 77 95 % -- --   03/03/24 15:23:42 98.8 °F (37.1 °C) 78 16 98/64 75 96 % -- --   03/03/24 0900 -- -- -- -- -- -- None (Room air) --   03/03/24 07:36:18 " 98.5 °F (36.9 °C) 62 16 101/65 77 94 % -- --   03/02/24 21:51:34 -- 89 -- 115/75 88 92 % -- Lying   03/02/24 21:47:59 98.1 °F (36.7 °C) 91 17 129/105 Abnormal  113 96 % -- Lying   03/02/24 2000 -- -- -- -- -- -- None (Room air) --   03/02/24 1900 -- -- -- -- -- 89 % Abnormal  -- --   03/02/24 17:12:48 98 °F (36.7 °C) 113 Abnormal  18 133/111 Abnormal  118 98 % -- --   03/02/24 0903 98.6 °F (37 °C) 85 16 107/69 82 97 % None (Room air) --   03/02/24 0900 -- -- -- -- -- -- None (Room air) --   03/02/24 08:50:57 98.4 °F (36.9 °C) 70 -- 107/69 82 95 % -- --       Pertinent Labs/Diagnostic Results:     3/3 Xary abd obstruction series - Nonobstructive bowel gas pattern.         Results from last 7 days   Lab Units 03/04/24  0546 03/01/24  0910   WBC Thousand/uL 14.81* 13.78*   HEMOGLOBIN g/dL 14.1 14.1   HEMATOCRIT % 44.1 43.9   PLATELETS Thousands/uL 390 165   NEUTROS ABS Thousands/µL 10.28* 9.66*         Results from last 7 days   Lab Units 03/04/24  0546 03/01/24  0910   SODIUM mmol/L 136 139   POTASSIUM mmol/L 4.0 3.9   CHLORIDE mmol/L 103 105   CO2 mmol/L 25 27   ANION GAP mmol/L 8 7   BUN mg/dL 11 8   CREATININE mg/dL 0.62 0.64   EGFR ml/min/1.73sq m 104 103   CALCIUM mg/dL 8.2* 8.5   MAGNESIUM mg/dL  --  2.1     Results from last 7 days   Lab Units 03/04/24  0546 03/01/24  0910   AST U/L 20 20   ALT U/L 47 53*   ALK PHOS U/L 80 81   TOTAL PROTEIN g/dL 6.1* 6.0*   ALBUMIN g/dL 3.6 3.5   TOTAL BILIRUBIN mg/dL 0.38 0.63   BILIRUBIN DIRECT mg/dL  --  0.18         Results from last 7 days   Lab Units 03/04/24  0546 03/01/24  0910   GLUCOSE RANDOM mg/dL 91 80     Results from last 7 days   Lab Units 03/01/24  0910   LIPASE u/L 9*     Results from last 7 days   Lab Units 03/01/24  0910   CRP mg/L 3.2*     Medications:   Scheduled Medications:  acetaminophen, 650 mg, Oral, Q8H KARISSA  dicyclomine, 20 mg, Oral, TID AC  enoxaparin, 40 mg, Subcutaneous, Q24H KARISSA  famotidine, 40 mg, Oral, Daily  hydrOXYzine HCL, 15 mg, Oral,  Daily  methylPREDNISolone sodium succinate, 10 mg, Intravenous, Daily  mirtazapine, 15 mg, Oral, HS  mometasone, , Topical, Daily  montelukast, 10 mg, Oral, HS  pantoprazole, 40 mg, Oral, Q24H      Continuous IV Infusions:  multi-electrolyte, 100 mL/hr, Intravenous, Continuous      PRN Meds:  bisacodyl, 5 mg, Oral, Daily PRN  butalbital-acetaminophen-caffeine, 1 tablet, Oral, Q4H PRN  diphenhydrAMINE, 50 mg, Intravenous, Q4H PRN - x 6 3/2. X 5 3/3, x 3 3/4  HYDROmorphone, 0.5 mg, Intravenous, Q4H PRN - x 6 3/2. X 5 3/3, x 3 3/4  HYDROmorphone, 0.2 mg, Intravenous, Q4H PRN - x 2 3/2, x 3 3/3  loperamide, 2 mg, Oral, Q4H PRN  ondansetron, 4 mg, Intravenous, Q6H PRN - x 1 3/2, 3/3  oxybutynin, 5 mg, Oral, BID PRN  trimethobenzamide, 200 mg, Intramuscular, Q6H PRN    Discharge Plan: TBD    Network Utilization Review Department  ATTENTION: Please call with any questions or concerns to [phone] and carefully listen to the prompts so that you are directed to the right person. All voicemails are confidential.   For Discharge needs, contact Care Management DC Support Team at [phone] opt. 2  Send all requests for admission clinical reviews, approved or denied determinations and any other requests to dedicated fax number below belonging to the campus where the patient is receiving treatment. List of dedicated fax numbers for the Facilities:  FACILITY NAME UR FAX NUMBER   ADMISSION DENIALS (Administrative/Medical Necessity) [phone]   DISCHARGE SUPPORT TEAM (NETWORK) [phone]   PARENT CHILD HEALTH (Maternity/NICU/Pediatrics) [phone]   [facility] [phone]   [facility] [phone]   [facility] [phone]   [facility] [phone]   [facility] [phone]   [facility] [phone]   [facility] [phone]   [facility] [phone]   [facility] [phone]   [facility] [phone]   [facility] [phone]   [facility] [phone]

## 2024-03-04 NOTE — ASSESSMENT & PLAN NOTE
Corn allergy noted   Multiple drug allergies noted but tolerated zosyn and unasyn  She tolerated lovenox and bentyl which were added recently  Continue PRN benadryl and start topical steroid for rash      No rash seen today.  Spoke with GI, changed protonix to pepcid for antihistamine effect.

## 2024-03-04 NOTE — CASE MANAGEMENT
Case Management Discharge Planning Note    Patient name Celine Roa  Location [facility] M* MRN [id number]  : 1975 Date 3/4/2024       Current Admission Date: 2024  Current Admission Diagnosis:Right upper quadrant abdominal pain   Patient Active Problem List    Diagnosis Date Noted    Oral phase dysphagia 2024    Pancreatitis 2024    Pollen-food allergy 2024    Oral allergy syndrome 2024    History of peripheral edema 2024    Hepatitis B antibody positive 2024    H/o Lyme disease 2024    Genetic disorder 2024    Endometriosis 2024    Anxiety 2024    Weight disorder 2024    Hepatitis 2024    Bladder spasm 02/10/2024    Intractable right upper quadrant abdominal pain 2024    Common variable immunodeficiency (HCC) 2024    Iron deficiency 2023    Iron deficiency anemia due to chronic blood loss 2023    Depression due to physical illness 2023    Circadian rhythm sleep disorder 2023    Insomnia due to medical condition 2023    Recurrent major depressive disorder, in partial remission ([facility]) 2023    Medical clearance for psychiatric admission 2023    Seasonal allergies 2023    GERD without esophagitis 2023    Major depressive disorder with psychotic features ([facility]) 2023    Benzodiazepine misuse 2023    Passive suicidal ideations 2023    Encephalopathy chronic 2023    Systemic lupus erythematosus, unspecified SLE type, unspecified organ involvement status (HCC) 2023    Bladder tumor 2023    Seizure ([facility])     Allergic reaction 2023    Idiopathic anaphylaxis 2023    Immunosuppression due to chronic steroid use  2022    Intractable migraine without status migrainosus 2022    Multiple allergies 2022    Crohn disease (HCC) 2022    Traumatic brain injury (HCC) 2022    Osteopenia 2022     Immunocompromised (HCC) 04/09/2022    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) 04/05/2022    Atrial tachycardia 02/24/2022    Nephrolithiasis 12/06/2021    Anaphylactic reaction 12/04/2021    Intractable nausea and vomiting 12/02/2021    Heart palpitations 03/26/2021    Inflammatory bowel disease 03/07/2021    Current chronic use of systemic steroids 01/28/2021    MS (multiple sclerosis) ([facility]) 01/28/2021    Migraine     Overweight (BMI 25.0-29.9) 01/20/2021    Anorexia 10/17/2020    Crohn's colitis ([facility]) 10/17/2020    Hashimoto's disease 10/05/2020    Mild protein-calorie malnutrition ([facility]) 09/12/2019    Constipation 09/10/2019    Mass of left axilla 08/30/2019    Proctitis 08/26/2019    Essential (hemorrhagic) thrombocythemia ([facility])     Thrush 02/12/2019    Chronic back pain 02/12/2019    Primary localized osteoarthrosis of ankle and foot 06/04/2018    Fibromyalgia 12/28/2017    Meniere's disease 11/29/2017    Leukocytosis 09/10/2017    Hypomagnesemia 08/26/2017    Generalized anxiety disorder 08/26/2017    Vomiting and diarrhea 08/25/2017    Vitamin D deficiency 08/25/2017    Throat tightness 08/25/2017    Right upper quadrant abdominal pain 08/25/2017    Disorder of synovium 06/23/2017    Chondromalacia 05/12/2017    Chronic idiopathic urticaria 05/02/2017    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HCC) 05/19/2016    Acquired hypothyroidism 01/13/2014      LOS (days): 20  Geometric Mean LOS (GMLOS) (days): 4.4  Days to GMLOS:-15.4     OBJECTIVE:  Risk of Unplanned Readmission Score: 36.57         Current admission status: Inpatient   Preferred Pharmacy:   [facility] - [place] - [address]  [address]  [place] [address]  Phone: [phone] Fax: [phone]    [facility] - [place] - [address] [address] [address]  [facility] [address]  Phone: [phone] Fax: [phone]    [address] [address]  Phone: [phone] Fax: [phone]    Primary Care Provider: Olaf Rhodes MD    Primary Insurance: [facility] BLUE [facility]  Secondary Insurance: MEDICARE    DISCHARGE DETAILS:                                                                                        IMM Given (Date):: 03/04/24  IMM Given to:: Patient     Additional Comments: IMM reviewed with pt at pt's bedside, pt expressed understaning of her right to appeal her hospital discharge, and pt signed the IMM.

## 2024-03-05 LAB — CORN IGE QN: <0.1 KUA/I

## 2024-03-05 PROCEDURE — 99232 SBSQ HOSP IP/OBS MODERATE 35: CPT | Performed by: HOSPITALIST

## 2024-03-05 PROCEDURE — NC001 PR NO CHARGE: Performed by: SURGERY

## 2024-03-05 RX ORDER — HYDROMORPHONE HYDROCHLORIDE 2 MG/1
4 TABLET ORAL EVERY 6 HOURS PRN
Status: DISCONTINUED | OUTPATIENT
Start: 2024-03-05 | End: 2024-03-07 | Stop reason: HOSPADM

## 2024-03-05 RX ORDER — LORAZEPAM 1 MG/1
1 TABLET ORAL EVERY 8 HOURS PRN
Status: DISCONTINUED | OUTPATIENT
Start: 2024-03-05 | End: 2024-03-07 | Stop reason: HOSPADM

## 2024-03-05 RX ADMIN — ENOXAPARIN SODIUM 40 MG: 40 INJECTION SUBCUTANEOUS at 08:45

## 2024-03-05 RX ADMIN — DIPHENHYDRAMINE HYDROCHLORIDE 50 MG: 50 INJECTION, SOLUTION INTRAMUSCULAR; INTRAVENOUS at 18:43

## 2024-03-05 RX ADMIN — DIPHENHYDRAMINE HYDROCHLORIDE 50 MG: 50 INJECTION, SOLUTION INTRAMUSCULAR; INTRAVENOUS at 14:43

## 2024-03-05 RX ADMIN — HYDROMORPHONE HYDROCHLORIDE 0.2 MG: 0.2 INJECTION, SOLUTION INTRAMUSCULAR; INTRAVENOUS; SUBCUTANEOUS at 18:43

## 2024-03-05 RX ADMIN — DIPHENHYDRAMINE HYDROCHLORIDE 50 MG: 50 INJECTION, SOLUTION INTRAMUSCULAR; INTRAVENOUS at 10:42

## 2024-03-05 RX ADMIN — DIPHENHYDRAMINE HYDROCHLORIDE 50 MG: 50 INJECTION, SOLUTION INTRAMUSCULAR; INTRAVENOUS at 22:40

## 2024-03-05 RX ADMIN — MOMETASONE FUROATE: 1 CREAM TOPICAL at 08:46

## 2024-03-05 RX ADMIN — HYDROMORPHONE HYDROCHLORIDE 0.2 MG: 0.2 INJECTION, SOLUTION INTRAMUSCULAR; INTRAVENOUS; SUBCUTANEOUS at 06:31

## 2024-03-05 RX ADMIN — HYDROMORPHONE HYDROCHLORIDE 4 MG: 2 TABLET ORAL at 16:14

## 2024-03-05 RX ADMIN — METHYLPREDNISOLONE SODIUM SUCCINATE 10 MG: 40 INJECTION, POWDER, FOR SOLUTION INTRAMUSCULAR; INTRAVENOUS at 08:44

## 2024-03-05 RX ADMIN — HYDROMORPHONE HYDROCHLORIDE 2 MG: 2 TABLET ORAL at 09:13

## 2024-03-05 RX ADMIN — HYDROMORPHONE HYDROCHLORIDE 0.2 MG: 0.2 INJECTION, SOLUTION INTRAMUSCULAR; INTRAVENOUS; SUBCUTANEOUS at 10:42

## 2024-03-05 RX ADMIN — FAMOTIDINE 40 MG: 20 TABLET, FILM COATED ORAL at 08:44

## 2024-03-05 RX ADMIN — SODIUM CHLORIDE, SODIUM GLUCONATE, SODIUM ACETATE, POTASSIUM CHLORIDE, MAGNESIUM CHLORIDE, SODIUM PHOSPHATE, DIBASIC, AND POTASSIUM PHOSPHATE 100 ML/HR: .53; .5; .37; .037; .03; .012; .00082 INJECTION, SOLUTION INTRAVENOUS at 13:31

## 2024-03-05 RX ADMIN — FAMOTIDINE 40 MG: 20 TABLET, FILM COATED ORAL at 17:00

## 2024-03-05 RX ADMIN — MONTELUKAST SODIUM 10 MG: 10 TABLET, FILM COATED ORAL at 22:40

## 2024-03-05 RX ADMIN — HYDROMORPHONE HYDROCHLORIDE 4 MG: 2 TABLET ORAL at 22:53

## 2024-03-05 RX ADMIN — ACETAMINOPHEN 325MG 650 MG: 325 TABLET ORAL at 13:32

## 2024-03-05 RX ADMIN — HYDROMORPHONE HYDROCHLORIDE 0.2 MG: 0.2 INJECTION, SOLUTION INTRAMUSCULAR; INTRAVENOUS; SUBCUTANEOUS at 14:43

## 2024-03-05 RX ADMIN — HYDROXYZINE HYDROCHLORIDE 15 MG: 10 TABLET ORAL at 08:45

## 2024-03-05 RX ADMIN — ONDANSETRON 4 MG: 2 INJECTION INTRAMUSCULAR; INTRAVENOUS at 19:56

## 2024-03-05 RX ADMIN — SODIUM CHLORIDE, SODIUM GLUCONATE, SODIUM ACETATE, POTASSIUM CHLORIDE, MAGNESIUM CHLORIDE, SODIUM PHOSPHATE, DIBASIC, AND POTASSIUM PHOSPHATE 100 ML/HR: .53; .5; .37; .037; .03; .012; .00082 INJECTION, SOLUTION INTRAVENOUS at 02:53

## 2024-03-05 RX ADMIN — HYDROMORPHONE HYDROCHLORIDE 2 MG: 2 TABLET ORAL at 02:45

## 2024-03-05 RX ADMIN — DIPHENHYDRAMINE HYDROCHLORIDE 50 MG: 50 INJECTION, SOLUTION INTRAMUSCULAR; INTRAVENOUS at 06:32

## 2024-03-05 RX ADMIN — ACETAMINOPHEN 325MG 650 MG: 325 TABLET ORAL at 22:40

## 2024-03-05 RX ADMIN — LORAZEPAM 1 MG: 1 TABLET ORAL at 19:56

## 2024-03-05 RX ADMIN — DIPHENHYDRAMINE HYDROCHLORIDE 50 MG: 50 INJECTION, SOLUTION INTRAMUSCULAR; INTRAVENOUS at 02:50

## 2024-03-05 RX ADMIN — MIRTAZAPINE 15 MG: 15 TABLET, FILM COATED ORAL at 22:40

## 2024-03-05 NOTE — ASSESSMENT & PLAN NOTE
Prolong hospitalization due to ongoing abdominal pain  Status post ERCP with sphincterotomy; follow-up MRCP has shown stable findings.  Extensive discussions have taken place between gastroenterology team and gynecology.  Some consideration has been given to possibility of endometriosis as contributory to her symptoms, as well as Crohn's flare.  Currently on IV Solu-Medrol for treatment of potential Crohn's flare, but steroids have not improved her pain at all and GI is weaning her off of steroids.  As per gynecology on 3/1: Is to initiate management of endometriosis as an outpatient and no utility in starting while inpatient as time to improvement may be up to 2 weeks.     I had a long discussion with the patient in the last 2 days that we need to get her off of IV narcotics and get her home so hopefully we can have her follow-up with GI for treatment of possible endometriosis and possibly outpatient evaluation by pain management if she has chronic abdominal pain.    I did change her to oral Dilaudid for severe pain yesterday and left the IV Dilaudid in place.  She is not sure if the oral Dilaudid was helping because she had a bad episode after receiving Bentyl which she said does cause severe abdominal pain.  So Bentyl has been discontinued.    I offered to increase the Dilaudid oral dose as well as add Ativan to see if that helps as she says the Ativan did give her some relief last night    Again I discussed with the patient that we need to get her on some type of oral regimen to get her home.    She requested general surgery evaluation to see if they could do an exploratory laparotomy.  In case she would have scar tissue and adhesions.  I did explain that it would be unlikely that she had adhesions as we did not see any signs of obstruction on the CT abdomen.  But she again requests general surgery consultation.  I did express that I can certainly consult them but I doubt they would offer surgery with her  complicated history but we can get their opinion.    Discussion with wife and .  I really do not have anything else to offer her.  We have changed her over to oral medications.  She has a gynecology appointment tomorrow.  It is vitally important that she make that appointment.  I offered to send her home with pain medication and Ativan this evening.  She does not feel comfortable going home.  But she is agreeable to go tomorrow.  I told her we have to be out in the morning so she can make it to this very important gynecologic appointment

## 2024-03-05 NOTE — ASSESSMENT & PLAN NOTE
History of Crohn's disease on Skyrizi as well as methotrexate    It was considered that possibly she could have a Crohn's flare causing this abdominal pain, she was started on IV steroids but had no improvement in her pain, so GI does not feel it is really a Crohn's flare.  They're tapering her off of steroids

## 2024-03-05 NOTE — QUICK NOTE
Case was discussed with Dr. Hanson yesterday.  Patient with chronic abdominal pain.  There are multiple etiologies for this.  Does not seem to be any surgical issues at this point although adhesive disease is still a possibility.  Not sure if mast cell activation syndrome would cause abdominal pain of this nature, probably not.  Acid suppressive therapy changed to famotidine twice daily  We will remain available if other issues should develop but at this point I do not think additional studies or tests would be beneficial.

## 2024-03-05 NOTE — PROGRESS NOTES
* Right upper quadrant abdominal pain  Assessment & Plan  Prolong hospitalization due to ongoing abdominal pain  Status post ERCP with sphincterotomy; follow-up MRCP has shown stable findings.  Extensive discussions have taken place between gastroenterology team and gynecology.  Some consideration has been given to possibility of endometriosis as contributory to her symptoms, as well as Crohn's flare.  Currently on IV Solu-Medrol for treatment of potential Crohn's flare, but steroids have not improved her pain at all and GI is weaning her off of steroids.  As per gynecology on 3/1: Is to initiate management of endometriosis as an outpatient and no utility in starting while inpatient as time to improvement may be up to 2 weeks.      I had a long discussion with the patient in the last 2 days that we need to get her off of IV narcotics and get her home so hopefully we can have her follow-up with GI for treatment of possible endometriosis and possibly outpatient evaluation by pain management if she has chronic abdominal pain.     I did change her to oral Dilaudid for severe pain yesterday and left the IV Dilaudid in place.  She is not sure if the oral Dilaudid was helping because she had a bad episode after receiving Bentyl which she said does cause severe abdominal pain.  So Bentyl has been discontinued.     I offered to increase the Dilaudid oral dose as well as add Ativan to see if that helps as she says the Ativan did give her some relief last night     Again I discussed with the patient that we need to get her on some type of oral regimen to get her home.     She requested general surgery evaluation to see if they could do an exploratory laparotomy.  In case she would have scar tissue and adhesions.  I did explain that it would be unlikely that she had adhesions as we did not see any signs of obstruction on the CT abdomen.  But she again requests general surgery consultation.  I did express that I can certainly  consult them but I doubt they would offer surgery with her complicated history but we can get their opinion.     Again I do not see any acute changes in her abdomen causing this pain.  The working diagnosis is that this is endometriosis.  We are trying to control the pain and get her home so she can start the outpatient Lupron injections.        Endometriosis  Assessment & Plan  This is currently the working diagnosis of the cause of her current abdominal pain.  Gynecology evaluated her but signed off.  They feel she needs outpatient treatment with Lupron.  They did not feel there would be any benefit to inpatient treatment        As above, I am trying to get her pain under control so we can get her discharged so she can get to these outpatient gynecology appointments.     Crohn's colitis (HCC)  Assessment & Plan  History of Crohn's disease on Skyrizi as well as methotrexate     It was considered that possibly she could have a Crohn's flare causing this abdominal pain, she was started on IV steroids but had no improvement in her pain, so GI does not feel it is really a Crohn's flare.  They're tapering her off of steroids        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HCC)  Assessment & Plan  Corn allergy noted   Multiple drug allergies noted but tolerated zosyn and unasyn  She tolerated lovenox and bentyl which were added recently  Continue PRN benadryl and start topical steroid for rash        No rash seen today.  Spoke with GI, changed protonix to pepcid for antihistamine effect.              Subjective:   Still complaining of abdominal pain.  Not sure if the oral Dilaudid helped yesterday afternoon because after taking Bentyl she states she got extreme abdominal cramping     She request surgical evaluation in case she would have scar tissue as he has had multiple abdominal surgeries in the past     She is having bowel movements which she states are normal     He is eating food     She states pain is 8 out of 10, mostly in  the right upper quadrant        Objective:      Vitals:   Temp (24hrs), Av.2 °F (36.8 °C), Min:98.1 °F (36.7 °C), Max:98.4 °F (36.9 °C)     Temp:  [98.1 °F (36.7 °C)-98.4 °F (36.9 °C)] 98.2 °F (36.8 °C)  HR:  [82-93] 82  Resp:  [16-20] 20  BP: ()/(64-78) 102/72  SpO2:  [94 %-95 %] 94 %  Body mass index is 26.89 kg/m².      Input and Output Summary (last 24 hours):         Intake/Output Summary (Last 24 hours) at 3/5/2024 1503  Last data filed at 3/5/2024 0253      Gross per 24 hour   Intake 925 ml   Output --   Net 925 ml         Physical Exam:      Physical Exam  Vitals and nursing note reviewed.   HENT:      Head: Normocephalic and atraumatic.   Eyes:      Pupils: Pupils are equal, round, and reactive to light.   Cardiovascular:      Rate and Rhythm: Normal rate and regular rhythm.      Heart sounds: No murmur heard.     No friction rub. No gallop.   Pulmonary:      Effort: Pulmonary effort is normal.      Breath sounds: Normal breath sounds. No wheezing or rales.   Abdominal:      General: Bowel sounds are normal.      Palpations: Abdomen is soft.      Tenderness: There is abdominal tenderness (mild RUQ tenderness,but less tender than yesterday). There is no guarding or rebound.   Musculoskeletal:      Right lower leg: No edema.      Left lower leg: No edema.         .         Additional Data:      Labs:          Results from last 7 days   Lab Units 24  0546   WBC Thousand/uL 14.81*   HEMOGLOBIN g/dL 14.1   HEMATOCRIT % 44.1   PLATELETS Thousands/uL 390   NEUTROS PCT % 69   LYMPHS PCT % 22   MONOS PCT % 6   EOS PCT % 1           Results from last 7 days   Lab Units 24  0546   POTASSIUM mmol/L 4.0   CHLORIDE mmol/L 103   CO2 mmol/L 25   BUN mg/dL 11   CREATININE mg/dL 0.62   CALCIUM mg/dL 8.2*   ALK PHOS U/L 80   ALT U/L 47   AST U/L 20                           * I Have Reviewed All Lab Data      Recent Cultures (last 7 days):                Last 24 Hours Medication List:            Current  Facility-Administered Medications   Medication Dose Route Frequency Provider Last Rate    acetaminophen  650 mg Oral Q8H [facility] Eagle Babin MD      bisacodyl  5 mg Oral Daily PRN Hair Noonan MD      butalbital-acetaminophen-caffeine  1 tablet Oral Q4H PRN Miles Holm, DO      dicyclomine  20 mg Oral TID AC Lonnie Mae MD      diphenhydrAMINE  50 mg Intravenous Q4H PRN Miles Holm, DO      enoxaparin  40 mg Subcutaneous Q24H [facility] Vipul Partida MD      famotidine  40 mg Oral BID Chirag Hanson, DO      HYDROmorphone  4 mg Oral Q6H PRN Chirag BUITRAGO Prechtel, DO      HYDROmorphone  0.2 mg Intravenous Q4H PRN Chirag BUITRAGO Prechtel, DO      hydrOXYzine HCL  15 mg Oral Daily Rhys Ruiz, DO      loperamide  2 mg Oral Q4H PRN Aubrey Ozuna MD      LORazepam  1 mg Oral Q8H PRN Chirag BUITRAGO Prechtel, DO      methylPREDNISolone sodium succinate  10 mg Intravenous Daily Shari Brown PA-C      mirtazapine  15 mg Oral HS Adonay Tineo MD      mometasone   Topical Daily Aubrey Ozuna MD      montelukast  10 mg Oral HS Rhys Ruiz, DO      multi-electrolyte  100 mL/hr Intravenous Continuous Vipul Partida  mL/hr (03/05/24 1331)    ondansetron  4 mg Intravenous Q6H PRN Miles Holm, DO      oxybutynin  5 mg Oral BID PRN Miles Holm, DO      trimethobenzamide  200 mg Intramuscular Q6H PRN Tommy Rapp PA-C              VTE Pharmacologic Prophylaxis:   Pharmacologic: Enoxaparin (Lovenox)        Current Length of Stay: 21 day(s)     Current Patient Status: Inpatient         Discharge Plan: surgery eval.   If not surgery planned, and her pain is better controlled with oral medcations, she can hopefully go home soon.     Code Status: Level 1 - Full Code              Today, Patient Was Seen By: Chirag Hanson DO     ** Please Note: Dictation voice to text software may have been used in the creation of this document. **

## 2024-03-05 NOTE — DISCHARGE SUMMARY
[facility]  Discharge- Celine Roa 1975, 49 y.o. female MRN: [id number]  Unit/Bed#: [place] [address] -01 Encounter: [id number]  Primary Care Provider: Olaf Rhodes MD   Date and time admitted to hospital: 2/13/2024  3:06 PM    * Right upper quadrant abdominal pain  Assessment & Plan  Prolong hospitalization due to ongoing abdominal pain  Status post ERCP with sphincterotomy; follow-up MRCP has shown stable findings.  Extensive discussions have taken place between gastroenterology team and gynecology.  Some consideration has been given to possibility of endometriosis as contributory to her symptoms, as well as Crohn's flare.  Currently on IV Solu-Medrol for treatment of potential Crohn's flare, but steroids have not improved her pain at all and GI is weaning her off of steroids.  As per gynecology on 3/1: Is to initiate management of endometriosis as an outpatient and no utility in starting while inpatient as time to improvement may be up to 2 weeks.     I had a long discussion with the patient in the last 2 days that we need to get her off of IV narcotics and get her home so hopefully we can have her follow-up with GI for treatment of possible endometriosis and possibly outpatient evaluation by pain management if she has chronic abdominal pain.    I did change her to oral Dilaudid for severe pain yesterday and left the IV Dilaudid in place.  She is not sure if the oral Dilaudid was helping because she had a bad episode after receiving Bentyl which she said does cause severe abdominal pain.  So Bentyl has been discontinued.    I offered to increase the Dilaudid oral dose as well as add Ativan to see if that helps as she says the Ativan did give her some relief last night    Again I discussed with the patient that we need to get her on some type of oral regimen to get her home.    She requested general surgery evaluation to see if they could do an exploratory laparotomy.  In case she  would have scar tissue and adhesions.  I did explain that it would be unlikely that she had adhesions as we did not see any signs of obstruction on the CT abdomen.  But she again requests general surgery consultation.  I did express that I can certainly consult them but I doubt they would offer surgery with her complicated history but we can get their opinion.    Again I do not see any acute changes in her abdomen causing this pain.  The working diagnosis is that this is endometriosis.  We are trying to control the pain and get her home so she can start the outpatient Lupron injections.      Endometriosis  Assessment & Plan  This is currently the working diagnosis of the cause of her current abdominal pain.  Gynecology evaluated her but signed off.  They feel she needs outpatient treatment with Lupron.  They did not feel there would be any benefit to inpatient treatment      As above, I am trying to get her pain under control so we can get her discharged so she can get to these outpatient gynecology appointments.    Crohn's colitis (HCC)  Assessment & Plan  History of Crohn's disease on Skyrizi as well as methotrexate    It was considered that possibly she could have a Crohn's flare causing this abdominal pain, she was started on IV steroids but had no improvement in her pain, so GI does not feel it is really a Crohn's flare.  They're tapering her off of steroids      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HCC)  Assessment & Plan  Corn allergy noted   Multiple drug allergies noted but tolerated zosyn and unasyn  She tolerated lovenox and bentyl which were added recently  Continue PRN benadryl and start topical steroid for rash      No rash seen today.  Spoke with GI, changed protonix to pepcid for antihistamine effect.          Subjective:   Still complaining of abdominal pain.  Not sure if the oral Dilaudid helped yesterday afternoon because after taking Bentyl she states she got extreme abdominal cramping    She request  surgical evaluation in case she would have scar tissue as he has had multiple abdominal surgeries in the past    She is having bowel movements which she states are normal    He is eating food    She states pain is 8 out of 10, mostly in the right upper quadrant      Objective:     Vitals:   Temp (24hrs), Av.2 °F (36.8 °C), Min:98.1 °F (36.7 °C), Max:98.4 °F (36.9 °C)    Temp:  [98.1 °F (36.7 °C)-98.4 °F (36.9 °C)] 98.2 °F (36.8 °C)  HR:  [82-93] 82  Resp:  [16-20] 20  BP: ()/(64-78) 102/72  SpO2:  [94 %-95 %] 94 %  Body mass index is 26.89 kg/m².     Input and Output Summary (last 24 hours):       Intake/Output Summary (Last 24 hours) at 3/5/2024 1503  Last data filed at 3/5/2024 0253  Gross per 24 hour   Intake 925 ml   Output --   Net 925 ml       Physical Exam:     Physical Exam  Vitals and nursing note reviewed.   HENT:      Head: Normocephalic and atraumatic.   Eyes:      Pupils: Pupils are equal, round, and reactive to light.   Cardiovascular:      Rate and Rhythm: Normal rate and regular rhythm.      Heart sounds: No murmur heard.     No friction rub. No gallop.   Pulmonary:      Effort: Pulmonary effort is normal.      Breath sounds: Normal breath sounds. No wheezing or rales.   Abdominal:      General: Bowel sounds are normal.      Palpations: Abdomen is soft.      Tenderness: There is abdominal tenderness (mild RUQ tenderness,but less tender than yesterday). There is no guarding or rebound.   Musculoskeletal:      Right lower leg: No edema.      Left lower leg: No edema.       .       Additional Data:     Labs:    Results from last 7 days   Lab Units 24  0546   WBC Thousand/uL 14.81*   HEMOGLOBIN g/dL 14.1   HEMATOCRIT % 44.1   PLATELETS Thousands/uL 390   NEUTROS PCT % 69   LYMPHS PCT % 22   MONOS PCT % 6   EOS PCT % 1     Results from last 7 days   Lab Units 24  0546   POTASSIUM mmol/L 4.0   CHLORIDE mmol/L 103   CO2 mmol/L 25   BUN mg/dL 11   CREATININE mg/dL 0.62   CALCIUM mg/dL  8.2*   ALK PHOS U/L 80   ALT U/L 47   AST U/L 20                       * I Have Reviewed All Lab Data     Recent Cultures (last 7 days):             Last 24 Hours Medication List:   Current Facility-Administered Medications   Medication Dose Route Frequency Provider Last Rate    acetaminophen  650 mg Oral Q8H [facility] Eagle Babin MD      bisacodyl  5 mg Oral Daily PRN Hair Noonan MD      butalbital-acetaminophen-caffeine  1 tablet Oral Q4H PRN Miles Holm, DO      dicyclomine  20 mg Oral TID AC Lonnie Mae MD      diphenhydrAMINE  50 mg Intravenous Q4H PRN Miles Holm, DO      enoxaparin  40 mg Subcutaneous Q24H [facility] Vipul Partida MD      famotidine  40 mg Oral BID Chirag S Prechtel, DO      HYDROmorphone  4 mg Oral Q6H PRN Chirag S Prechtel, DO      HYDROmorphone  0.2 mg Intravenous Q4H PRN Chirag S Prechtel, DO      hydrOXYzine HCL  15 mg Oral Daily Rhys Ruiz, DO      loperamide  2 mg Oral Q4H PRN Aubrey Ozuna MD      LORazepam  1 mg Oral Q8H PRN Chirag S Prechtel, DO      methylPREDNISolone sodium succinate  10 mg Intravenous Daily Shari Brown PA-C      mirtazapine  15 mg Oral HS Adonay Tineo MD      mometasone   Topical Daily Aubrey Ozuna MD      montelukast  10 mg Oral HS Rhys Ruiz, DO      multi-electrolyte  100 mL/hr Intravenous Continuous Vipul Partida  mL/hr (03/05/24 1331)    ondansetron  4 mg Intravenous Q6H PRN Miles Holm, DO      oxybutynin  5 mg Oral BID PRN Miles Holm, DO      trimethobenzamide  200 mg Intramuscular Q6H PRN Tommy Rapp PA-C           VTE Pharmacologic Prophylaxis:   Pharmacologic: Enoxaparin (Lovenox)      Current Length of Stay: 21 day(s)    Current Patient Status: Inpatient       Discharge Plan: surgery eval.   If not surgery planned, and her pain is better controlled with oral medcations, she can hopefully go home soon.    Code Status: Level 1 - Full Code           Today, Patient  Was Seen By: Chirag Hanson DO    ** Please Note: Dictation voice to text software may have been used in the creation of this document. **

## 2024-03-05 NOTE — ASSESSMENT & PLAN NOTE
Corn allergy noted   Multiple drug allergies noted but tolerated zosyn and unasyn  She tolerated lovenox and bentyl which were added recently  Continue PRN benadryl and start topical steroid for rash    No rash seen.  Follow-up with her tertiary care specialists

## 2024-03-05 NOTE — PLAN OF CARE
Problem: Knowledge Deficit  Goal: Patient/family/caregiver demonstrates understanding of disease process, treatment plan, medications, and discharge instructions  Description: Complete learning assessment and assess knowledge base.  Interventions:  - Provide teaching at level of understanding  - Provide teaching via preferred learning methods  Outcome: Progressing     Problem: DISCHARGE PLANNING  Goal: Discharge to home or other facility with appropriate resources  Description: INTERVENTIONS:  - Identify barriers to discharge w/patient and caregiver  - Arrange for needed discharge resources and transportation as appropriate  - Identify discharge learning needs (meds, wound care, etc.)  - Arrange for interpretive services to assist at discharge as needed  - Refer to Case Management Department for coordinating discharge planning if the patient needs post-hospital services based on physician/advanced practitioner order or complex needs related to functional status, cognitive ability, or social support system  Outcome: Progressing     Problem: METABOLIC, FLUID AND ELECTROLYTES - ADULT  Goal: Fluid balance maintained  Description: INTERVENTIONS:  - Monitor labs   - Monitor I/O and WT  - Instruct patient on fluid and nutrition as appropriate  - Assess for signs & symptoms of volume excess or deficit  Outcome: Progressing     Problem: PAIN - ADULT  Goal: Verbalizes/displays adequate comfort level or baseline comfort level  Description: Interventions:  - Encourage patient to monitor pain and request assistance  - Assess pain using appropriate pain scale  - Administer analgesics based on type and severity of pain and evaluate response  - Implement non-pharmacological measures as appropriate and evaluate response  - Consider cultural and social influences on pain and pain management  - Notify physician/advanced practitioner if interventions unsuccessful or patient reports new pain  Outcome: Not Progressing     Problem:  GASTROINTESTINAL - ADULT  Goal: Minimal or absence of nausea and/or vomiting  Description: INTERVENTIONS:  - Administer IV fluids if ordered to ensure adequate hydration  - Maintain NPO status until nausea and vomiting are resolved  - Nasogastric tube if ordered  - Administer ordered antiemetic medications as needed  - Provide nonpharmacologic comfort measures as appropriate  - Advance diet as tolerated, if ordered  - Consider nutrition services referral to assist patient with adequate nutrition and appropriate food choices  Outcome: Not Progressing  Goal: Maintains or returns to baseline bowel function  Description: INTERVENTIONS:  - Assess bowel function  - Encourage oral fluids to ensure adequate hydration  - Administer IV fluids if ordered to ensure adequate hydration  - Administer ordered medications as needed  - Encourage mobilization and activity  - Consider nutritional services referral to assist patient with adequate nutrition and appropriate food choices  Outcome: Not Progressing  Goal: Maintains adequate nutritional intake  Description: INTERVENTIONS:  - Monitor percentage of each meal consumed  - Identify factors contributing to decreased intake, treat as appropriate  - Assist with meals as needed  - Monitor I&O, weight, and lab values if indicated  - Obtain nutrition services referral as needed  Outcome: Not Progressing     Problem: Nutrition/Hydration-ADULT  Goal: Nutrient/Hydration intake appropriate for improving, restoring or maintaining nutritional needs  Description: Monitor and assess patient's nutrition/hydration status for malnutrition. Collaborate with interdisciplinary team and initiate plan and interventions as ordered.  Monitor patient's weight and dietary intake as ordered or per policy. Utilize nutrition screening tool and intervene as necessary. Determine patient's food preferences and provide high-protein, high-caloric foods as appropriate.     INTERVENTIONS:  - Monitor oral intake,  urinary output, labs, and treatment plans  - Assess nutrition and hydration status and recommend course of action  - Evaluate amount of meals eaten  - Assist patient with eating if necessary   - Allow adequate time for meals  - Recommend/ encourage appropriate diets, oral nutritional supplements, and vitamin/mineral supplements  - Order, calculate, and assess calorie counts as needed  - Recommend, monitor, and adjust tube feedings and TPN/PPN based on assessed needs  - Assess need for intravenous fluids  - Provide specific nutrition/hydration education as appropriate  - Include patient/family/caregiver in decisions related to nutrition  Outcome: Not Progressing

## 2024-03-05 NOTE — CONSULTS
Consultation - General Surgery  Celine Roa 49 y.o. female MRN: [id number]  Unit/Bed#: [place] [address] -01 Encounter: [id number]      Assessment:  49F with chronic abdominal pain mostly in RUQ/upper abdomen.    Plan:  - no acute surgical intervention  - diet as tolerated  - appreciate GI recs    HPI:  Celine Roa is a 49 y.o. 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 Describes pain as karime, constant. Worse with movement, eating. Better with laying down. Initially thought to have CBD dilation, ERCP with no stone. Gyn suggested lupron in 2w for possible endometriosis remnants. Last colonoscopy 3/2023 with mild erythematous mucosa in descending colon to rectum, otherwise normal.  Reports n/v. Last ate at lunch today, eats slowly. Last BM today, normal. But reports alternating constipation/diarrhea. Passing flatus.  VSS on RA.    2/15 CT ap: no acute findings  2/14 ERCP: no filling defects    Meds: Lovenox, dicyclomine, famotidine, hydroxyzine, Solu-Medrol, mirtazapine  Results:  Recent Labs     03/04/24  0546   WBC 14.81*   HGB 14.1      SODIUM 136   K 4.0      CO2 25   BUN 11   CREATININE 0.62   GLUC 91   CALCIUM 8.2*   AST 20   ALT 47   ALKPHOS 80   TBILI 0.38       Physical Exam  Constitutional:       General: She is not in acute distress.  HENT:      Head: Normocephalic and atraumatic.   Eyes:      Extraocular Movements: Extraocular movements intact.      Conjunctiva/sclera: Conjunctivae normal.   Cardiovascular:      Rate and Rhythm: Normal rate.      Pulses:  Normal pulses.   Pulmonary:      Effort: Pulmonary effort is normal. No respiratory distress.   Abdominal:      General: There is distension.      Palpations: Abdomen is soft.      Tenderness: There is abdominal tenderness (upper).   Musculoskeletal:         General: Normal range of motion.      Cervical back: Normal range of motion.   Skin:     General: Skin is warm and dry.   Neurological:      General: No focal deficit present.      Mental Status: She is alert and oriented to person, place, and time.   Psychiatric:         Mood and Affect: Mood normal.         Review of Systems   Constitutional:  Negative for chills and fever.   HENT:  Negative for ear pain and sore throat.    Eyes:  Negative for pain and visual disturbance.   Respiratory:  Negative for cough and shortness of breath.    Cardiovascular:  Negative for chest pain and palpitations.   Gastrointestinal:  Positive for abdominal distention, abdominal pain (RUQ/upper), constipation, diarrhea, nausea and vomiting.   Genitourinary:  Positive for difficulty urinating. Negative for dysuria and hematuria.   Musculoskeletal:  Negative for arthralgias and back pain.   Skin:  Negative for color change and rash.   Neurological:  Negative for seizures and syncope.   All other systems reviewed and are negative.      Historical Information   Past Medical History:   Diagnosis Date    Anemia 2007    Anxiety     Asthma     Bile duct stricture 2014    Sphincter of oddi dysfunction    Chronic kidney disease     Closed bimalleolar fracture 02/22/2024    Colon polyp 1998    Crohn's colitis (HCC)     Cyst of ovary 02/22/2024    Deep vein thrombosis (HCC)     Disease of thyroid gland     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    Generalized anxiety disorder 08/26/2017    GERD (gastroesophageal reflux disease) 1995    GI (gastrointestinal bleed) 1994    Heart murmur     Inflammatory bowel disease     Irritable bowel syndrome 2016    Lactose intolerance 1982     Mast cell disease     Migraine     Myocardial infarction (HCC)     NSTEMI. pt denies, documented in cardio note    Pain of right thigh 02/13/2023    Pancreatitis     sphinger of     Psychiatric disorder     RA (rheumatoid arthritis) (HCC)     Seizures (HCC)     Sepsis without acute organ dysfunction (HCC)     SIRS (systemic inflammatory response syndrome) (HCC)     Thrombocytosis 04/05/2022    Ulcerative colitis (HCC)     Visual impairment      Past Surgical History:   Procedure Laterality Date    ABDOMINAL SURGERY  2017    APPENDECTOMY      CHOLECYSTECTOMY      COLONOSCOPY  2019    CYSTOSCOPY N/A 6/8/2023    Procedure: CYSTOSCOPY, mini TURBT with fulgeration;  Surgeon: Tee Bruno MD;  Location: [facility];  Service: Urology    EGD AND COLONOSCOPY N/A 09/12/2017    Procedure: EGD AND COLONOSCOPY;  Surgeon: Tommy Oliver MD;  Location:  GI LAB;  Service: Gastroenterology    ERCP N/A 09/15/2017    Procedure: ENDOSCOPIC RETROGRADE CHOLANGIOPANCREATOGRAPHY (ERCP);  Surgeon: Dre Soto MD;  Location:  GI LAB;  Service: Gastroenterology    HYSTERECTOMY      KNEE SURGERY Right     LAPAROSCOPIC ENDOMETRIOSIS FULGURATION      ORIF ANKLE FRACTURE Left     KS ARTHRS KNE SURG W/MENISCECTOMY MED/LAT W/SHVG Left 05/12/2017    Procedure: POSSIBLE MEDIAL MENISECTOMY;  Surgeon: Kristian Avila MD;  Location: [facility];  Service: Orthopedics    KS ARTHRS KNEE DEBRIDEMENT/SHAVING ARTCLR CRTLG Left 05/12/2017    Procedure: KNEE ARTHROSCOPY EVALUATION, CHONDROPLASTY;  Surgeon: Kristian Avila MD;  Location: [facility];  Service: Orthopedics    KS LAPS ABD PRTM&OMENTUM DX W/WO SPEC BR/WA SPX N/A 12/12/2017    Procedure: LAPAROSCOPY DIAGNOSTIC  WITH LYSIS OF ADHESIONS;  Surgeon: Olaf John DO;  Location:  MAIN OR;  Service: General    UPPER GASTROINTESTINAL ENDOSCOPY  2019     Social History   Social History     Substance and Sexual Activity   Alcohol Use Never     Social History     Substance and Sexual Activity   Drug Use Never      Social History     Tobacco Use   Smoking Status Never   Smokeless Tobacco Never       Meds/Allergies   all current active meds have been reviewed  Allergies   Allergen Reactions    Aimovig [Erenumab-Aooe] Anaphylaxis    Amitriptyline Anaphylaxis     According to the patient she had nphylaxis    Aspergillus Allergy Skin Test Other (See Comments), Hives and Swelling    Azathioprine Other (See Comments) and Anaphylaxis     pancreatitis  According to patient she needed Epipen    Buspar [Buspirone] Hives and Shortness Of Breath    Citric Acid-Polysorbate 80 Abdominal Pain, Anaphylaxis, Anxiety, Bradycardia, Diarrhea, Fatigue, GI Intolerance, Headache, Hives, Hyperactivity, Itching, Lip Swelling, Nausea Only, Palpitations, Rash, Shortness Of Breath, Tachycardia, Throat Swelling, Tongue Swelling and Vomiting    Corn Dextrin Anaphylaxis     Any corn based products    Erenumab Anaphylaxis     patient sent portal message stating she had anaphylaxis  patient sent portal message stating she had anaphylaxis      Gadolinium Abdominal Pain, Anaphylaxis, Anxiety, Confusion, Delirium, Dizziness, Eye Swelling, Fatigue, GI Intolerance, Headache, Hives, Irritability, Itching, Lip Swelling, Nausea Only, Palpitations, Photosensitivity, Rash, Seizures, Shortness Of Breath, Swelling, Syncope, Throat Swelling, Tongue Swelling, Tremor, Visual Disturbance and Vomiting    Gelatin - Food Allergy Anaphylaxis    Heparin Hives     Painful welts     Lavender Oil Anaphylaxis     Other reaction(s): anaphalxis    Malto Dextrin [Dextrin] Anaphylaxis    Other Anaphylaxis     Gel caps, maltodextrose, dextrose,grapes, lavender     Penicillium Notatum Shortness Of Breath and Swelling    Red Dye - Food Allergy Anaphylaxis and Other (See Comments)     intolerance    Sumatriptan Anaphylaxis     Bradycardia, sob, back pressure, head pain,throat tightness  According to the patient she had anphylaxis    Ustekinumab Anaphylaxis    Lidocaine Hives, Itching and  "Rash    Contrast [Iodinated Contrast Media] Other (See Comments)     Pt states needs to be premedicated prior to injection    Corn Oil - Food Allergy - Food Allergy Hives    Humira [Adalimumab] Other (See Comments)     \"I develop drug induced lupus\"    Ibuprofen Hives and Throat Swelling    Polyethylene Glycol Diarrhea and Vomiting    Remicade [Infliximab] Other (See Comments)     \"I develop drug induced lupus\"    Sulfa Antibiotics Hives and Other (See Comments)    Sulfate Hives    Sulfites - Food Allergy Hives    Sweet Corn Extract Skin Test - Food Allergy Hives and Itching    Chlorhexidine Itching    Freederm Adhesive Remover [New Skin] Rash    Histamine Hives, Rash and Other (See Comments)     Intolerance      Wound Dressings Rash       Objective   First Vitals:   Blood Pressure: 106/70 (02/13/24 1529)  Pulse: 76 (02/13/24 1529)  Temperature: 98.3 °F (36.8 °C) (02/13/24 1529)  Temp Source: Oral (02/13/24 1529)  Respirations: 16 (02/13/24 1529)  Height: 5' 5\" (165.1 cm) (02/26/24 2203)  Weight - Scale: 74.1 kg (163 lb 5.8 oz) (02/14/24 0500)  SpO2: 96 % (02/13/24 1529)    Current Vitals:   Blood Pressure: 103/72 (03/05/24 1526)  Pulse: 82 (03/05/24 0738)  Temperature: 98.3 °F (36.8 °C) (03/05/24 1526)  Temp Source: Oral (03/04/24 0640)  Respirations: 20 (03/05/24 1526)  Height: 5' 5\" (165.1 cm) (02/26/24 2203)  Weight - Scale: 73.3 kg (161 lb 9.6 oz) (03/04/24 0600)  SpO2: 94 % (03/05/24 0738)      Intake/Output Summary (Last 24 hours) at 3/5/2024 9969  Last data filed at 3/5/2024 0800  Gross per 24 hour   Intake 1045 ml   Output --   Net 1045 ml       Invasive Devices       Peripheral Intravenous Line  Duration             Peripheral IV 03/04/24 Right Wrist <1 day              Line  Duration             Pump Device  -- days                    Lab Results: I have personally reviewed pertinent lab results.  Imaging: I have personally reviewed pertinent reports.  EKG, Pathology, and Other Studies: I have personally " reviewed pertinent reports.    Counseling / Coordination of Care  Total floor / unit time spent today 30 minutes.  Greater than 50% of total time was spent with the patient and / or family counseling and / or coordination of care.

## 2024-03-05 NOTE — ASSESSMENT & PLAN NOTE
This is currently the working diagnosis of the cause of her current abdominal pain.  Gynecology evaluated her but signed off.  They feel she needs outpatient treatment with Lupron.  They did not feel there would be any benefit to inpatient treatment      As above, I am trying to get her pain under control so we can get her discharged so she can get to these outpatient gynecology appointments.

## 2024-03-06 LAB
ANION GAP SERPL CALCULATED.3IONS-SCNC: 7 MMOL/L
BASOPHILS # BLD AUTO: 0.11 THOUSANDS/ÂΜL (ref 0–0.1)
BASOPHILS NFR BLD AUTO: 1 % (ref 0–1)
BUN SERPL-MCNC: 14 MG/DL (ref 5–25)
CALCIUM SERPL-MCNC: 8.6 MG/DL (ref 8.4–10.2)
CHLORIDE SERPL-SCNC: 104 MMOL/L (ref 96–108)
CO2 SERPL-SCNC: 28 MMOL/L (ref 21–32)
CREAT SERPL-MCNC: 0.66 MG/DL (ref 0.6–1.3)
CULTIVATED CORN IGE QN: <0.1 KU/L
EOSINOPHIL # BLD AUTO: 0.18 THOUSAND/ÂΜL (ref 0–0.61)
EOSINOPHIL NFR BLD AUTO: 1 % (ref 0–6)
ERYTHROCYTE [DISTWIDTH] IN BLOOD BY AUTOMATED COUNT: 14.2 % (ref 11.6–15.1)
GFR SERPL CREATININE-BSD FRML MDRD: 102 ML/MIN/1.73SQ M
GLUCOSE SERPL-MCNC: 83 MG/DL (ref 65–140)
HCT VFR BLD AUTO: 43.3 % (ref 34.8–46.1)
HGB BLD-MCNC: 14.3 G/DL (ref 11.5–15.4)
IMM GRANULOCYTES # BLD AUTO: 0.12 THOUSAND/UL (ref 0–0.2)
IMM GRANULOCYTES NFR BLD AUTO: 1 % (ref 0–2)
LYMPHOCYTES # BLD AUTO: 3.12 THOUSANDS/ÂΜL (ref 0.6–4.47)
LYMPHOCYTES NFR BLD AUTO: 23 % (ref 14–44)
MAGNESIUM SERPL-MCNC: 2.2 MG/DL (ref 1.9–2.7)
MCH RBC QN AUTO: 33.7 PG (ref 26.8–34.3)
MCHC RBC AUTO-ENTMCNC: 33 G/DL (ref 31.4–37.4)
MCV RBC AUTO: 102 FL (ref 82–98)
MONOCYTES # BLD AUTO: 1.13 THOUSAND/ÂΜL (ref 0.17–1.22)
MONOCYTES NFR BLD AUTO: 8 % (ref 4–12)
NEUTROPHILS # BLD AUTO: 9.09 THOUSANDS/ÂΜL (ref 1.85–7.62)
NEUTS SEG NFR BLD AUTO: 66 % (ref 43–75)
NRBC BLD AUTO-RTO: 0 /100 WBCS
PLATELET # BLD AUTO: 486 THOUSANDS/UL (ref 149–390)
PMV BLD AUTO: 9 FL (ref 8.9–12.7)
POTASSIUM SERPL-SCNC: 4.1 MMOL/L (ref 3.5–5.3)
RBC # BLD AUTO: 4.24 MILLION/UL (ref 3.81–5.12)
SODIUM SERPL-SCNC: 139 MMOL/L (ref 135–147)
WBC # BLD AUTO: 13.75 THOUSAND/UL (ref 4.31–10.16)

## 2024-03-06 PROCEDURE — 99232 SBSQ HOSP IP/OBS MODERATE 35: CPT | Performed by: HOSPITALIST

## 2024-03-06 PROCEDURE — 83735 ASSAY OF MAGNESIUM: CPT | Performed by: HOSPITALIST

## 2024-03-06 PROCEDURE — 80048 BASIC METABOLIC PNL TOTAL CA: CPT | Performed by: HOSPITALIST

## 2024-03-06 PROCEDURE — 85025 COMPLETE CBC W/AUTO DIFF WBC: CPT | Performed by: HOSPITALIST

## 2024-03-06 RX ADMIN — HYDROMORPHONE HYDROCHLORIDE 4 MG: 2 TABLET ORAL at 20:30

## 2024-03-06 RX ADMIN — METHYLPREDNISOLONE SODIUM SUCCINATE 10 MG: 40 INJECTION, POWDER, FOR SOLUTION INTRAMUSCULAR; INTRAVENOUS at 08:15

## 2024-03-06 RX ADMIN — DIPHENHYDRAMINE HYDROCHLORIDE 50 MG: 50 INJECTION, SOLUTION INTRAMUSCULAR; INTRAVENOUS at 08:15

## 2024-03-06 RX ADMIN — SODIUM CHLORIDE, SODIUM GLUCONATE, SODIUM ACETATE, POTASSIUM CHLORIDE, MAGNESIUM CHLORIDE, SODIUM PHOSPHATE, DIBASIC, AND POTASSIUM PHOSPHATE 100 ML/HR: .53; .5; .37; .037; .03; .012; .00082 INJECTION, SOLUTION INTRAVENOUS at 08:28

## 2024-03-06 RX ADMIN — DIPHENHYDRAMINE HYDROCHLORIDE 50 MG: 50 INJECTION, SOLUTION INTRAMUSCULAR; INTRAVENOUS at 17:09

## 2024-03-06 RX ADMIN — ACETAMINOPHEN 325MG 650 MG: 325 TABLET ORAL at 14:22

## 2024-03-06 RX ADMIN — DIPHENHYDRAMINE HYDROCHLORIDE 50 MG: 50 INJECTION, SOLUTION INTRAMUSCULAR; INTRAVENOUS at 04:08

## 2024-03-06 RX ADMIN — LORAZEPAM 1 MG: 1 TABLET ORAL at 13:09

## 2024-03-06 RX ADMIN — HYDROMORPHONE HYDROCHLORIDE 0.2 MG: 0.2 INJECTION, SOLUTION INTRAMUSCULAR; INTRAVENOUS; SUBCUTANEOUS at 00:03

## 2024-03-06 RX ADMIN — ENOXAPARIN SODIUM 40 MG: 40 INJECTION SUBCUTANEOUS at 08:15

## 2024-03-06 RX ADMIN — HYDROMORPHONE HYDROCHLORIDE 0.2 MG: 0.2 INJECTION, SOLUTION INTRAMUSCULAR; INTRAVENOUS; SUBCUTANEOUS at 17:10

## 2024-03-06 RX ADMIN — LORAZEPAM 1 MG: 1 TABLET ORAL at 20:30

## 2024-03-06 RX ADMIN — HYDROMORPHONE HYDROCHLORIDE 0.2 MG: 0.2 INJECTION, SOLUTION INTRAMUSCULAR; INTRAVENOUS; SUBCUTANEOUS at 05:52

## 2024-03-06 RX ADMIN — HYDROMORPHONE HYDROCHLORIDE 0.2 MG: 0.2 INJECTION, SOLUTION INTRAMUSCULAR; INTRAVENOUS; SUBCUTANEOUS at 10:14

## 2024-03-06 RX ADMIN — HYDROMORPHONE HYDROCHLORIDE 4 MG: 2 TABLET ORAL at 13:03

## 2024-03-06 RX ADMIN — MONTELUKAST SODIUM 10 MG: 10 TABLET, FILM COATED ORAL at 21:18

## 2024-03-06 RX ADMIN — SODIUM CHLORIDE, SODIUM GLUCONATE, SODIUM ACETATE, POTASSIUM CHLORIDE, MAGNESIUM CHLORIDE, SODIUM PHOSPHATE, DIBASIC, AND POTASSIUM PHOSPHATE 100 ML/HR: .53; .5; .37; .037; .03; .012; .00082 INJECTION, SOLUTION INTRAVENOUS at 17:08

## 2024-03-06 RX ADMIN — ACETAMINOPHEN 325MG 650 MG: 325 TABLET ORAL at 05:53

## 2024-03-06 RX ADMIN — DIPHENHYDRAMINE HYDROCHLORIDE 50 MG: 50 INJECTION, SOLUTION INTRAMUSCULAR; INTRAVENOUS at 21:19

## 2024-03-06 RX ADMIN — MOMETASONE FUROATE: 1 CREAM TOPICAL at 08:28

## 2024-03-06 RX ADMIN — FAMOTIDINE 40 MG: 20 TABLET, FILM COATED ORAL at 17:11

## 2024-03-06 RX ADMIN — HYDROMORPHONE HYDROCHLORIDE 4 MG: 2 TABLET ORAL at 04:47

## 2024-03-06 RX ADMIN — SODIUM CHLORIDE, SODIUM GLUCONATE, SODIUM ACETATE, POTASSIUM CHLORIDE, MAGNESIUM CHLORIDE, SODIUM PHOSPHATE, DIBASIC, AND POTASSIUM PHOSPHATE 100 ML/HR: .53; .5; .37; .037; .03; .012; .00082 INJECTION, SOLUTION INTRAVENOUS at 00:01

## 2024-03-06 RX ADMIN — LORAZEPAM 1 MG: 1 TABLET ORAL at 04:08

## 2024-03-06 RX ADMIN — HYDROXYZINE HYDROCHLORIDE 15 MG: 10 TABLET ORAL at 08:14

## 2024-03-06 RX ADMIN — DIPHENHYDRAMINE HYDROCHLORIDE 50 MG: 50 INJECTION, SOLUTION INTRAMUSCULAR; INTRAVENOUS at 13:03

## 2024-03-06 RX ADMIN — FAMOTIDINE 40 MG: 20 TABLET, FILM COATED ORAL at 08:14

## 2024-03-06 RX ADMIN — HYDROMORPHONE HYDROCHLORIDE 0.2 MG: 0.2 INJECTION, SOLUTION INTRAMUSCULAR; INTRAVENOUS; SUBCUTANEOUS at 21:19

## 2024-03-06 RX ADMIN — MIRTAZAPINE 15 MG: 15 TABLET, FILM COATED ORAL at 21:18

## 2024-03-06 NOTE — PLAN OF CARE
Problem: PAIN - ADULT  Goal: Verbalizes/displays adequate comfort level or baseline comfort level  Description: Interventions:  - Encourage patient to monitor pain and request assistance  - Assess pain using appropriate pain scale  - Administer analgesics based on type and severity of pain and evaluate response  - Implement non-pharmacological measures as appropriate and evaluate response  - Consider cultural and social influences on pain and pain management  - Notify physician/advanced practitioner if interventions unsuccessful or patient reports new pain  3/6/2024 0817 by Da Brownlee RN  Outcome: Progressing  3/6/2024 0817 by Da Brownlee RN  Outcome: Progressing     Problem: Knowledge Deficit  Goal: Patient/family/caregiver demonstrates understanding of disease process, treatment plan, medications, and discharge instructions  Description: Complete learning assessment and assess knowledge base.  Interventions:  - Provide teaching at level of understanding  - Provide teaching via preferred learning methods  3/6/2024 0817 by Da Brownlee RN  Outcome: Progressing  3/6/2024 0817 by Da Brownlee RN  Outcome: Progressing     Problem: GASTROINTESTINAL - ADULT  Goal: Minimal or absence of nausea and/or vomiting  Description: INTERVENTIONS:  - Administer IV fluids if ordered to ensure adequate hydration  - Maintain NPO status until nausea and vomiting are resolved  - Nasogastric tube if ordered  - Administer ordered antiemetic medications as needed  - Provide nonpharmacologic comfort measures as appropriate  - Advance diet as tolerated, if ordered  - Consider nutrition services referral to assist patient with adequate nutrition and appropriate food choices  3/6/2024 0817 by Da Brownlee RN  Outcome: Progressing  3/6/2024 0817 by Da Brownlee RN  Outcome: Progressing  Goal: Maintains or returns to baseline bowel function  Description: INTERVENTIONS:  - Assess bowel function  - Encourage oral  fluids to ensure adequate hydration  - Administer IV fluids if ordered to ensure adequate hydration  - Administer ordered medications as needed  - Encourage mobilization and activity  - Consider nutritional services referral to assist patient with adequate nutrition and appropriate food choices  3/6/2024 0817 by Da Brownlee RN  Outcome: Progressing  3/6/2024 0817 by Da Brownlee RN  Outcome: Progressing  Goal: Maintains adequate nutritional intake  Description: INTERVENTIONS:  - Monitor percentage of each meal consumed  - Identify factors contributing to decreased intake, treat as appropriate  - Assist with meals as needed  - Monitor I&O, weight, and lab values if indicated  - Obtain nutrition services referral as needed  3/6/2024 0817 by Da Brownlee RN  Outcome: Progressing  3/6/2024 0817 by Da Brownlee RN  Outcome: Progressing     Problem: DISCHARGE PLANNING  Goal: Discharge to home or other facility with appropriate resources  Description: INTERVENTIONS:  - Identify barriers to discharge w/patient and caregiver  - Arrange for needed discharge resources and transportation as appropriate  - Identify discharge learning needs (meds, wound care, etc.)  - Arrange for interpretive services to assist at discharge as needed  - Refer to Case Management Department for coordinating discharge planning if the patient needs post-hospital services based on physician/advanced practitioner order or complex needs related to functional status, cognitive ability, or social support system  3/6/2024 0817 by Da Brownlee RN  Outcome: Progressing  3/6/2024 0817 by Da Brownlee RN  Outcome: Progressing     Problem: METABOLIC, FLUID AND ELECTROLYTES - ADULT  Goal: Fluid balance maintained  Description: INTERVENTIONS:  - Monitor labs   - Monitor I/O and WT  - Instruct patient on fluid and nutrition as appropriate  - Assess for signs & symptoms of volume excess or deficit  3/6/2024 0817 by Da Brownlee  RN  Outcome: Progressing  3/6/2024 0817 by Da Brownlee RN  Outcome: Progressing     Problem: Nutrition/Hydration-ADULT  Goal: Nutrient/Hydration intake appropriate for improving, restoring or maintaining nutritional needs  Description: Monitor and assess patient's nutrition/hydration status for malnutrition. Collaborate with interdisciplinary team and initiate plan and interventions as ordered.  Monitor patient's weight and dietary intake as ordered or per policy. Utilize nutrition screening tool and intervene as necessary. Determine patient's food preferences and provide high-protein, high-caloric foods as appropriate.     INTERVENTIONS:  - Monitor oral intake, urinary output, labs, and treatment plans  - Assess nutrition and hydration status and recommend course of action  - Evaluate amount of meals eaten  - Assist patient with eating if necessary   - Allow adequate time for meals  - Recommend/ encourage appropriate diets, oral nutritional supplements, and vitamin/mineral supplements  - Order, calculate, and assess calorie counts as needed  - Recommend, monitor, and adjust tube feedings and TPN/PPN based on assessed needs  - Assess need for intravenous fluids  - Provide specific nutrition/hydration education as appropriate  - Include patient/family/caregiver in decisions related to nutrition  3/6/2024 0817 by Da Brownlee RN  Outcome: Progressing  3/6/2024 0817 by Da Brownlee RN  Outcome: Progressing

## 2024-03-06 NOTE — PROGRESS NOTES
[facility]  Progress Note  Name: Celine Roa I  MRN: [id number]  Unit/Bed#: [place] [address] -01 I Date of Admission: 2/13/2024   Date of Service: 3/6/2024 I Hospital Day: 22    Assessment/Plan   * Right upper quadrant abdominal pain  Assessment & Plan  Prolong hospitalization due to ongoing abdominal pain  Status post ERCP with sphincterotomy; follow-up MRCP has shown stable findings.  Extensive discussions have taken place between gastroenterology team and gynecology.  Some consideration has been given to possibility of endometriosis as contributory to her symptoms, as well as Crohn's flare.  Currently on IV Solu-Medrol for treatment of potential Crohn's flare, but steroids have not improved her pain at all and GI is weaning her off of steroids.  As per gynecology on 3/1: Is to initiate management of endometriosis as an outpatient and no utility in starting while inpatient as time to improvement may be up to 2 weeks.     I had a long discussion with the patient in the last 2 days that we need to get her off of IV narcotics and get her home so hopefully we can have her follow-up with GI for treatment of possible endometriosis and possibly outpatient evaluation by pain management if she has chronic abdominal pain.    I did change her to oral Dilaudid for severe pain yesterday and left the IV Dilaudid in place.  She is not sure if the oral Dilaudid was helping because she had a bad episode after receiving Bentyl which she said does cause severe abdominal pain.  So Bentyl has been discontinued.    I offered to increase the Dilaudid oral dose as well as add Ativan to see if that helps as she says the Ativan did give her some relief last night    Again I discussed with the patient that we need to get her on some type of oral regimen to get her home.    She requested general surgery evaluation to see if they could do an exploratory laparotomy.  In case she would have scar tissue and adhesions.   I did explain that it would be unlikely that she had adhesions as we did not see any signs of obstruction on the CT abdomen.  But she again requests general surgery consultation.  I did express that I can certainly consult them but I doubt they would offer surgery with her complicated history but we can get their opinion.    Discussion with wife and .  I really do not have anything else to offer her.  We have changed her over to oral medications.  She has a gynecology appointment tomorrow.  It is vitally important that she make that appointment.  I offered to send her home with pain medication and Ativan this evening.  She does not feel comfortable going home.  But she is agreeable to go tomorrow.  I told her we have to be out in the morning so she can make it to this very important gynecologic appointment    Endometriosis  Assessment & Plan  This is currently the working diagnosis of the cause of her current abdominal pain.  Gynecology evaluated her but signed off.  They feel she needs outpatient treatment with Lupron.  They did not feel there would be any benefit to inpatient treatment      As above, I am trying to get her pain under control so we can get her discharged so she can get to these outpatient gynecology appointments.    Crohn's colitis (HCC)  Assessment & Plan  History of Crohn's disease on Skyrizi as well as methotrexate    It was considered that possibly she could have a Crohn's flare causing this abdominal pain, she was started on IV steroids but had no improvement in her pain, so GI does not feel it is really a Crohn's flare.  They're tapering her off of steroids      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HCC)  Assessment & Plan  Corn allergy noted   Multiple drug allergies noted but tolerated zosyn and unasyn  She tolerated lovenox and bentyl which were added recently  Continue PRN benadryl and start topical steroid for rash    No rash seen.  Follow-up with her tertiary care specialists                Subjective:   Her  brought her in a sandwich that she is eating that and feels a little bit better.  She states she had another episode of severe abdominal pain last night.  She is very concerned that that may occur again tonight.      Objective:     Vitals:   Temp (24hrs), Av.4 °F (36.9 °C), Min:98.3 °F (36.8 °C), Max:98.7 °F (37.1 °C)    Temp:  [98.3 °F (36.8 °C)-98.7 °F (37.1 °C)] 98.3 °F (36.8 °C)  HR:  [78-88] 84  Resp:  [18-20] 20  BP: (103-124)/(72-85) 116/81  SpO2:  [93 %-96 %] 96 %  Body mass index is 27 kg/m².     Input and Output Summary (last 24 hours):       Intake/Output Summary (Last 24 hours) at 3/6/2024 1516  Last data filed at 3/6/2024 0559  Gross per 24 hour   Intake 1430 ml   Output --   Net 1430 ml       Physical Exam:     Physical Exam  Eyes:      Extraocular Movements: Extraocular movements intact.      Pupils: Pupils are equal, round, and reactive to light.   Abdominal:      Comments: Soft, ice bag on abdomen  Minimal pain to palpation   Musculoskeletal:      Right lower leg: No edema.      Left lower leg: No edema.       .       Additional Data:     Labs:    Results from last 7 days   Lab Units 24  0548   WBC Thousand/uL 13.75*   HEMOGLOBIN g/dL 14.3   HEMATOCRIT % 43.3   PLATELETS Thousands/uL 486*   NEUTROS PCT % 66   LYMPHS PCT % 23   MONOS PCT % 8   EOS PCT % 1     Results from last 7 days   Lab Units 24  0548 24  0546   POTASSIUM mmol/L 4.1 4.0   CHLORIDE mmol/L 104 103   CO2 mmol/L 28 25   BUN mg/dL 14 11   CREATININE mg/dL 0.66 0.62   CALCIUM mg/dL 8.6 8.2*   ALK PHOS U/L  --  80   ALT U/L  --  47   AST U/L  --  20                       * I Have Reviewed All Lab Data     Recent Cultures (last 7 days):             Last 24 Hours Medication List:   Current Facility-Administered Medications   Medication Dose Route Frequency Provider Last Rate    acetaminophen  650 mg Oral Q8H KARISSA Babin MD      bisacodyl  5 mg Oral Daily PRN Hair Noonan MD       butalbital-acetaminophen-caffeine  1 tablet Oral Q4H PRN Miles Holm, DO      dicyclomine  20 mg Oral TID AC Lonnie Mae MD      diphenhydrAMINE  50 mg Intravenous Q4H PRN Miles Holm, DO      enoxaparin  40 mg Subcutaneous Q24H [facility] Vipul Partida MD      famotidine  40 mg Oral BID Chirag Hanson, DO      HYDROmorphone  4 mg Oral Q6H PRN Chirag BUITRAGO Prechtel, DO      HYDROmorphone  0.2 mg Intravenous Q4H PRN Chirag Vegahtel, DO      hydrOXYzine HCL  15 mg Oral Daily Rhys Ruiz, DO      loperamide  2 mg Oral Q4H PRN Aubrey Ozuna MD      LORazepam  1 mg Oral Q8H PRN Chirag Hanson, DO      methylPREDNISolone sodium succinate  10 mg Intravenous Daily Shari Brown PA-C      mirtazapine  15 mg Oral HS Adonay Tineo MD      mometasone   Topical Daily Aubrey Ozuna MD      montelukast  10 mg Oral HS Rhys Ruiz, DO      multi-electrolyte  100 mL/hr Intravenous Continuous Vipul Partida  mL/hr (03/06/24 0828)    ondansetron  4 mg Intravenous Q6H PRN Miles Holm, DO      oxybutynin  5 mg Oral BID PRN Miles Holm, DO      trimethobenzamide  200 mg Intramuscular Q6H PRN Tommy Rapp PA-C           VTE Pharmacologic Prophylaxis:   Pharmacologic: Enoxaparin (Lovenox)      Current Length of Stay: 22 day(s)    Current Patient Status: Inpatient       Discharge Plan: home tomorrow    Code Status: Level 1 - Full Code           Today, Patient Was Seen By: Chirag Hanson DO    ** Please Note: Dictation voice to text software may have been used in the creation of this document. **

## 2024-03-07 ENCOUNTER — OFFICE VISIT (OUTPATIENT)
Dept: GYNECOLOGY | Facility: CLINIC | Age: 49
End: 2024-03-07
Payer: COMMERCIAL

## 2024-03-07 VITALS
HEIGHT: 65 IN | HEART RATE: 90 BPM | DIASTOLIC BLOOD PRESSURE: 85 MMHG | WEIGHT: 163 LBS | BODY MASS INDEX: 27.16 KG/M2 | SYSTOLIC BLOOD PRESSURE: 122 MMHG

## 2024-03-07 VITALS
OXYGEN SATURATION: 95 % | BODY MASS INDEX: 27.22 KG/M2 | SYSTOLIC BLOOD PRESSURE: 122 MMHG | DIASTOLIC BLOOD PRESSURE: 85 MMHG | TEMPERATURE: 97.9 F | WEIGHT: 163.36 LBS | HEIGHT: 65 IN | RESPIRATION RATE: 16 BRPM | HEART RATE: 90 BPM

## 2024-03-07 DIAGNOSIS — R10.11 RIGHT UPPER QUADRANT ABDOMINAL PAIN: Primary | ICD-10-CM

## 2024-03-07 PROCEDURE — 99239 HOSP IP/OBS DSCHRG MGMT >30: CPT | Performed by: HOSPITALIST

## 2024-03-07 PROCEDURE — 99215 OFFICE O/P EST HI 40 MIN: CPT | Performed by: OBSTETRICS & GYNECOLOGY

## 2024-03-07 RX ORDER — PREDNISONE 20 MG/1
TABLET ORAL
Qty: 100 TABLET | Refills: 0 | Status: SHIPPED | OUTPATIENT
Start: 2024-03-07

## 2024-03-07 RX ORDER — HYDROMORPHONE HYDROCHLORIDE 4 MG/1
4 TABLET ORAL EVERY 6 HOURS PRN
Qty: 30 TABLET | Refills: 0 | Status: SHIPPED | OUTPATIENT
Start: 2024-03-07 | End: 2024-03-07

## 2024-03-07 RX ORDER — OXYBUTYNIN CHLORIDE 5 MG/1
5 TABLET ORAL 2 TIMES DAILY PRN
Qty: 10 TABLET | Refills: 0 | Status: SHIPPED | OUTPATIENT
Start: 2024-03-07

## 2024-03-07 RX ORDER — LORAZEPAM 1 MG/1
1 TABLET ORAL EVERY 8 HOURS PRN
Qty: 10 TABLET | Refills: 0 | Status: SHIPPED | OUTPATIENT
Start: 2024-03-07 | End: 2024-03-17

## 2024-03-07 RX ORDER — DIPHENHYDRAMINE HCL 25 MG
25 TABLET ORAL ONCE
Status: COMPLETED | OUTPATIENT
Start: 2024-03-07 | End: 2024-03-07

## 2024-03-07 RX ORDER — HYDROMORPHONE HYDROCHLORIDE 4 MG/1
4 TABLET ORAL EVERY 6 HOURS PRN
Qty: 30 TABLET | Refills: 0 | Status: SHIPPED | OUTPATIENT
Start: 2024-03-07 | End: 2024-03-17

## 2024-03-07 RX ADMIN — FAMOTIDINE 40 MG: 20 TABLET, FILM COATED ORAL at 08:10

## 2024-03-07 RX ADMIN — DIPHENHYDRAMINE HYDROCHLORIDE 25 MG: 25 TABLET ORAL at 08:50

## 2024-03-07 RX ADMIN — HYDROXYZINE HYDROCHLORIDE 15 MG: 10 TABLET ORAL at 08:10

## 2024-03-07 RX ADMIN — HYDROMORPHONE HYDROCHLORIDE 4 MG: 2 TABLET ORAL at 02:40

## 2024-03-07 RX ADMIN — MOMETASONE FUROATE: 1 CREAM TOPICAL at 08:14

## 2024-03-07 RX ADMIN — ENOXAPARIN SODIUM 40 MG: 40 INJECTION SUBCUTANEOUS at 08:10

## 2024-03-07 RX ADMIN — DIPHENHYDRAMINE HYDROCHLORIDE 50 MG: 50 INJECTION, SOLUTION INTRAMUSCULAR; INTRAVENOUS at 01:29

## 2024-03-07 RX ADMIN — SODIUM CHLORIDE, SODIUM GLUCONATE, SODIUM ACETATE, POTASSIUM CHLORIDE, MAGNESIUM CHLORIDE, SODIUM PHOSPHATE, DIBASIC, AND POTASSIUM PHOSPHATE 100 ML/HR: .53; .5; .37; .037; .03; .012; .00082 INJECTION, SOLUTION INTRAVENOUS at 03:52

## 2024-03-07 RX ADMIN — LORAZEPAM 1 MG: 1 TABLET ORAL at 08:10

## 2024-03-07 RX ADMIN — HYDROMORPHONE HYDROCHLORIDE 4 MG: 2 TABLET ORAL at 08:50

## 2024-03-07 NOTE — PROGRESS NOTES
Assessment/Plan:       Diagnoses and all orders for this visit:    Right upper quadrant abdominal pain  ; ; I do not feel that this pain in the right upper quadrant is related to endometriosis.  On the slim chance that it is secondary to endometriosis we discussed various treatment options for endometriosis including medical management versus surgical management.  We are limited as far as medical management based on her multiple allergies.  The plan is to start Lupron 1 mg subcu daily for ovarian suppression.  If successful in alleviating her pain, which I believe is highly unlikely, we will continue Lupron for 6 months.  If she is not seeing any improvement after 1 month on the Lupron she will discontinue this and follow-up with her PCP/gastroenterologist.  She has been advised to have a repeat transvaginal scan in a couple months to reevaluate the ovarian cysts      Entire visit discussion, review of medical records and initiating Lupron therapy.  45 minutes  Subjective:      Patient ID: Celine Roa is a 49 y.o. female.    HPI patient was discharged from the hospital earlier today.  She has been in the hospital for approximately 30 days.  She had presented with significant right upper quadrant pain.  Patient was last seen in our office in May 2022.  Patient is status post total laparoscopic hysterectomy and 2007 by Dr. Woods secondary to stage I endometriosis.    Again she was recently admitted with complaints of a 6 months of right upper quadrant pain which became worse with eating.  Patient was concerned that this could be secondary to endometriosis.  The pain became worse with eating and and change changes of position.  She is also had a bile duct dilation and required an ERCP and sphincterotomy.  Patient has a history of Crohn's disease.  There was no improvement with IV steroids while hospitalized.    Patient has a very complex past medical history, including history of Crohn's disease, DVT, mast cell  activation syndrome, rheumatoid arthritis, depression, chronic kidney disease.  Along with multiple allergies.    While in the hospital she did have imaging; ultrasound done on January simple right ovarian cyst versus a septated cyst measuring 3 x 2.8 x 2.2 cm along with a left ovarian cystic structure with a septation versus adjacent multiple cyst measuring 2.8 x 1.2 x 2.2 cm.    Lab work while in the hospital revealed FSH and LH normal with normal estradiol level.  Based on lab work patient is not menopausal.    The following portions of the patient's history were reviewed and updated as appropriate: She  has a past medical history of Anemia (2007), Anxiety, Asthma, Bile duct stricture (2014), Chronic kidney disease, Closed bimalleolar fracture (02/22/2024), Colon polyp (1998), Crohn's colitis (HCC), Cyst of ovary (02/22/2024), Deep vein thrombosis (HCC), Disease of thyroid gland, Disorder of sphincter of Oddi, Generalized anxiety disorder (08/26/2017), GERD (gastroesophageal reflux disease) (1995), GI (gastrointestinal bleed) (1994), Heart murmur, Inflammatory bowel disease, Irritable bowel syndrome (2016), Lactose intolerance (1982), Mast cell disease, Migraine, Myocardial infarction (HCC), Pain of right thigh (02/13/2023), Pancreatitis, Psychiatric disorder, RA (rheumatoid arthritis) (HCC), Seizures (HCC), Sepsis without acute organ dysfunction (HCC), SIRS (systemic inflammatory response syndrome) (HCC), Thrombocytosis (04/05/2022), Ulcerative colitis (HCC), and Visual impairment.  She   Patient Active Problem List    Diagnosis Date Noted    Oral phase dysphagia 02/22/2024    Pancreatitis 02/22/2024    Pollen-food allergy 02/22/2024    Oral allergy syndrome 02/22/2024    History of peripheral edema 02/22/2024    Hepatitis B antibody positive 02/22/2024    H/o Lyme disease 02/22/2024    Genetic disorder 02/22/2024    Endometriosis 02/22/2024    Anxiety 02/22/2024    Weight disorder 02/22/2024    Hepatitis  02/22/2024    Bladder spasm 02/10/2024    Intractable right upper quadrant abdominal pain 02/07/2024    Common variable immunodeficiency (HCC) 01/29/2024    Iron deficiency 12/06/2023    Iron deficiency anemia due to chronic blood loss 12/06/2023    Depression due to physical illness 12/06/2023    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    Insomnia due to medical condition 11/03/2023    Recurrent major depressive disorder, in partial remission ([facility]) 08/18/2023    Medical clearance for psychiatric admission 07/25/2023    Seasonal allergies 07/25/2023    GERD without esophagitis 07/25/2023    Major depressive disorder with psychotic features ([facility]) 07/25/2023    Benzodiazepine misuse 07/25/2023    Passive suicidal ideations 07/22/2023    Encephalopathy chronic 07/19/2023    Systemic lupus erythematosus, unspecified SLE type, unspecified organ involvement status ([facility]) 06/23/2023    Bladder tumor 06/03/2023    Seizure ([facility])     Allergic reaction 03/29/2023    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    Immunosuppression due to chronic steroid use  08/05/2022    Intractable migraine without status migrainosus 04/09/2022    Multiple allergies 04/09/2022    Crohn disease ([facility]) 04/09/2022    Traumatic brain injury ([facility]) 04/09/2022    Osteopenia 04/09/2022    Immunocompromised ([facility]) 04/09/2022    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    Hiatal hernia 04/09/2022    Ulcerative colitis ([facility]) 04/05/2022    Small fiber neuropathy 04/05/2022    Immunosuppression ([facility]) 04/05/2022    Atrial tachycardia 02/24/2022    Nephrolithiasis 12/06/2021    Anaphylactic reaction 12/04/2021    Intractable nausea and vomiting 12/02/2021    Heart palpitations 03/26/2021    Inflammatory bowel disease 03/07/2021    Current chronic use of systemic steroids 01/28/2021    MS (multiple sclerosis) ([facility]) 01/28/2021    Migraine     Overweight (BMI 25.0-29.9) 01/20/2021    Anorexia 10/17/2020    Crohn's colitis ([facility]) 10/17/2020    Hashimoto's disease  10/05/2020    Mild protein-calorie malnutrition (HCC) 09/12/2019    Constipation 09/10/2019    Mass of left axilla 08/30/2019    Proctitis 08/26/2019    Essential (hemorrhagic) thrombocythemia (HCC)     Thrush 02/12/2019    Chronic back pain 02/12/2019    Primary localized osteoarthrosis of ankle and foot 06/04/2018    Fibromyalgia 12/28/2017    Meniere's disease 11/29/2017    Leukocytosis 09/10/2017    Hypomagnesemia 08/26/2017    Generalized anxiety disorder 08/26/2017    Vomiting and diarrhea 08/25/2017    Vitamin D deficiency 08/25/2017    Throat tightness 08/25/2017    Right upper quadrant abdominal pain 08/25/2017    Disorder of synovium 06/23/2017    Chondromalacia 05/12/2017    Chronic idiopathic urticaria 05/02/2017    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HCC) 05/19/2016    Acquired hypothyroidism 01/13/2014     She  has a past surgical history that includes Appendectomy; ORIF ankle fracture (Left); Cholecystectomy; Hysterectomy; Knee surgery (Right); Laparoscopic endometriosis fulguration; pr arthrs knee debridement/shaving artclr crtlg (Left, 05/12/2017); pr arthrs kne surg w/meniscectomy med/lat w/shvg (Left, 05/12/2017); EGD AND COLONOSCOPY (N/A, 09/12/2017); ERCP (N/A, 09/15/2017); pr laps abd prtm&omentum dx w/wo spec br/wa spx (N/A, 12/12/2017); Abdominal surgery (2017); Upper gastrointestinal endoscopy (2019); Colonoscopy (2019); and CYSTOSCOPY (N/A, 6/8/2023).  Her family history includes Arthritis in her maternal grandmother, mother, and paternal grandmother; Asthma in her daughter; Breast cancer in her maternal aunt; Breast cancer (age of onset: 54) in her paternal aunt; Breast cancer additional onset (age of onset: 58) in her paternal aunt; Cancer in her maternal aunt, paternal aunt, and paternal grandfather; Colon polyps in her mother; Depression in her paternal uncle; Diabetes in her father; Early death in her father, paternal grandfather, and paternal uncle; Heart disease in her maternal  "grandmother; Heart failure in her father, paternal aunt, and paternal aunt; Hypothyroidism in her daughter and paternal aunt; Irritable bowel syndrome in her paternal aunt; Kidney disease in her brother; Lymphoma in her paternal grandfather; Macular degeneration in her father and paternal grandmother; Miscarriages / Stillbirths in her maternal aunt; Neuropathy in her father; No Known Problems in her maternal grandfather, son, and son; Ulcerative colitis in her mother; Vision loss in her father and paternal grandmother.  She  reports that she has never smoked. She has never used smokeless tobacco. She reports that she does not drink alcohol and does not use drugs.  Current Outpatient Medications   Medication Sig Dispense Refill    Ascorbic Acid (vitamin C) 1000 MG tablet Take 1,000 mg by mouth 2 (two) times a day      B-D 3CC LUER-SHAMEKA SYR 25GX1\" 25G X 1\" 3 ML MISC       butalbital-acetaminophen-caffeine (FIORICET,ESGIC) -40 mg per tablet Take 1 tablet by mouth every 8 (eight) hours as needed for migraine 20 tablet 1    cetirizine (ZyrTEC) 10 mg tablet Take 20 mg by mouth daily      cholecalciferol (VITAMIN D3) 1,000 units tablet Take 1,000 Units by mouth 2 (two) times a day      cyanocobalamin (VITAMIN B-12) 100 mcg tablet Take by mouth daily      diphenhydrAMINE (BENADRYL) 50 mg capsule Take 50 mg by mouth 4 (four) times a day as needed for itching Pt states takes 50mg daily, but may go up to 4 times daily prn      famotidine (PEPCID) 40 MG tablet TAKE ONE TABLET BY MOUTH ONCE DAILY 90 tablet 1    folic acid (KP Folic Acid) 1 mg tablet Take 1 tablet (1 mg total) by mouth daily 30 tablet 3    HYDROmorphone (DILAUDID) 4 mg tablet Take 1 tablet (4 mg total) by mouth every 6 (six) hours as needed for severe pain for up to 10 days Caution.   May cause sedation.   Do not drive or operate anything after taking Max Daily Amount: 16 mg 30 tablet 0    hydrOXYzine HCL (ATARAX) 25 mg tablet Take 15 mg by mouth daily      " "Lactobacillus (PROBIOTIC ACIDOPHILUS PO) Take by mouth      Lactobacillus-Inulin ([facility]) CAPS Take 200 mg by mouth daily      leuprolide 1 mg/0.2 mL Inject 0.2 mL (1 mg total) under the skin in the morning 60 each 0    levalbuterol (XOPENEX HFA) 45 mcg/act inhaler Inhale 1-2 puffs every 4 (four) hours as needed for shortness of breath 45 g 2    levothyroxine 100 mcg tablet Take 75 mcg by mouth every other day      liothyronine (CYTOMEL) 5 mcg tablet Take 1 tablet (5 mcg total) by mouth daily Do not start before June 14, 2023. 30 tablet 0    lisinopril (ZESTRIL) 10 mg tablet       LORazepam (ATIVAN) 1 mg tablet Take 1 tablet (1 mg total) by mouth every 8 (eight) hours as needed for anxiety (abdominal pain) for up to 10 days Caution may cause sedation.   Do not take at the same time as dilaudid 10 tablet 0    magnesium gluconate (MAGONATE) 500 mg tablet Take 500 mg by mouth 2 (two) times a day      Melatonin 3 MG SUBL 6 mg daily at bedtime      methotrexate 50 MG/2ML injection Inject 1 mL (25 mg total) under the skin once a week 4 mL 10    mirtazapine (REMERON) 15 mg tablet Take 1 tablet (15 mg total) by mouth daily at bedtime 90 tablet 1    montelukast (SINGULAIR) 10 mg tablet Take 1 tablet (10 mg total) by mouth daily at bedtime  0    NEEDLE, DISP, 27 G (B-D DISP NEEDLE 56OX7-7/4\") 27G X 1-1/4\" MISC Use once a week 100 each 0    oxybutynin (DITROPAN) 5 mg tablet Take 1 tablet (5 mg total) by mouth 2 (two) times a day as needed (bladder pain) 10 tablet 0    predniSONE 20 mg tablet Take 40mg per day for 1 week, then 30mg per day for 1 week, then 20mg per day for 1 week and then 10mg per day for 1 week. 100 tablet 0    Riboflavin (VITAMIN B-2 PO) Take by mouth      Riboflavin (Vitamin B-2) 100 MG TABS Take 100 mg by mouth 2 (two) times a day      risankizumab-rzaa (Skyrizi) 360 MG/2.4ML SOCT Take first on body injector (OBI) under skin on week 12 then every 8 weeks there after 2.4 mL 5    TechLite " "Lancets MISC        No current facility-administered medications for this visit.     Current Outpatient Medications on File Prior to Visit   Medication Sig    Ascorbic Acid (vitamin C) 1000 MG tablet Take 1,000 mg by mouth 2 (two) times a day    B-D 3CC LUER-SHAMEKA SYR 25GX1\" 25G X 1\" 3 ML MISC     butalbital-acetaminophen-caffeine (FIORICET,ESGIC) -40 mg per tablet Take 1 tablet by mouth every 8 (eight) hours as needed for migraine    cetirizine (ZyrTEC) 10 mg tablet Take 20 mg by mouth daily    cholecalciferol (VITAMIN D3) 1,000 units tablet Take 1,000 Units by mouth 2 (two) times a day    cyanocobalamin (VITAMIN B-12) 100 mcg tablet Take by mouth daily    diphenhydrAMINE (BENADRYL) 50 mg capsule Take 50 mg by mouth 4 (four) times a day as needed for itching Pt states takes 50mg daily, but may go up to 4 times daily prn    famotidine (PEPCID) 40 MG tablet TAKE ONE TABLET BY MOUTH ONCE DAILY    folic acid (KP Folic Acid) 1 mg tablet Take 1 tablet (1 mg total) by mouth daily    HYDROmorphone (DILAUDID) 4 mg tablet Take 1 tablet (4 mg total) by mouth every 6 (six) hours as needed for severe pain for up to 10 days Caution.   May cause sedation.   Do not drive or operate anything after taking Max Daily Amount: 16 mg    hydrOXYzine HCL (ATARAX) 25 mg tablet Take 15 mg by mouth daily    Lactobacillus (PROBIOTIC ACIDOPHILUS PO) Take by mouth    Lactobacillus-Inulin ([facility]) CAPS Take 200 mg by mouth daily    leuprolide 1 mg/0.2 mL Inject 0.2 mL (1 mg total) under the skin in the morning    levalbuterol (XOPENEX HFA) 45 mcg/act inhaler Inhale 1-2 puffs every 4 (four) hours as needed for shortness of breath    levothyroxine 100 mcg tablet Take 75 mcg by mouth every other day    liothyronine (CYTOMEL) 5 mcg tablet Take 1 tablet (5 mcg total) by mouth daily Do not start before June 14, 2023.    lisinopril (ZESTRIL) 10 mg tablet     LORazepam (ATIVAN) 1 mg tablet Take 1 tablet (1 mg total) by mouth " "every 8 (eight) hours as needed for anxiety (abdominal pain) for up to 10 days Caution may cause sedation.   Do not take at the same time as dilaudid    magnesium gluconate (MAGONATE) 500 mg tablet Take 500 mg by mouth 2 (two) times a day    Melatonin 3 MG SUBL 6 mg daily at bedtime    methotrexate 50 MG/2ML injection Inject 1 mL (25 mg total) under the skin once a week    mirtazapine (REMERON) 15 mg tablet Take 1 tablet (15 mg total) by mouth daily at bedtime    montelukast (SINGULAIR) 10 mg tablet Take 1 tablet (10 mg total) by mouth daily at bedtime    NEEDLE, DISP, 27 G (B-D DISP NEEDLE 17WF1-1/4\") 27G X 1-1/4\" MISC Use once a week    oxybutynin (DITROPAN) 5 mg tablet Take 1 tablet (5 mg total) by mouth 2 (two) times a day as needed (bladder pain)    predniSONE 20 mg tablet Take 40mg per day for 1 week, then 30mg per day for 1 week, then 20mg per day for 1 week and then 10mg per day for 1 week.    Riboflavin (VITAMIN B-2 PO) Take by mouth    Riboflavin (Vitamin B-2) 100 MG TABS Take 100 mg by mouth 2 (two) times a day    risankizumab-rzaa (Skyrizi) 360 MG/2.4ML SOCT Take first on body injector (OBI) under skin on week 12 then every 8 weeks there after    TechLite Lancets MISC     [DISCONTINUED] Cholecalciferol 10 MCG /0.025ML LIQD Take 1,000 Units by mouth 2 (two) times a day (Patient not taking: Reported on 1/29/2024)    [DISCONTINUED] diclofenac (VOLTAREN) 75 mg EC tablet  (Patient not taking: Reported on 1/29/2024)    [DISCONTINUED] EpiPen 2-Malachi 0.3 MG/0.3ML SOAJ INJECT 1 PEN INTO THE MUSCLE AS NEEDED FOR ANAPHALAXIS (Patient not taking: Reported on 1/29/2024)    [DISCONTINUED] HYDROmorphone (DILAUDID) 4 mg tablet Take 1 tablet (4 mg total) by mouth every 6 (six) hours as needed for severe pain for up to 10 days Caution.   May cause sedation.   Do not drive or operate anything after taking Max Daily Amount: 16 mg    [DISCONTINUED] melatonin 3 mg Take 1 tablet (3 mg total) by mouth daily at bedtime (Patient " not taking: Reported on 1/29/2024)    [DISCONTINUED] methotrexate 50 MG/2ML injection Inject 1 mL (25 mg total) under the skin once a week (Patient not taking: Reported on 1/29/2024)    [DISCONTINUED] NON FORMULARY immunoglobin sq 6g 30 ml once a week    [DISCONTINUED] nystatin (MYCOSTATIN) 500,000 units/5 mL suspension Swish and spit 5 mL (500,000 Units total) 4 (four) times a day (Patient not taking: Reported on 9/13/2023)    [DISCONTINUED] omalizumab (XOLAIR) 150 mg Inject 2.4 mL (300 mg total) under the skin every 21 days (Patient taking differently: Inject 300 mg under the skin every 28 days)    [DISCONTINUED] pantoprazole (PROTONIX) 40 mg tablet TAKE ONE TABLET BY MOUTH TWICE A DAY (Patient not taking: Reported on 11/27/2023)    [DISCONTINUED] predniSONE 20 mg tablet Take 40mg per day for 1 week, then 30mg per day for 1 week, then 20mg per day for 1 week and then 10mg per day for 1 week.    [DISCONTINUED] traMADol (Ultram) 50 mg tablet Take 1 tablet (50 mg total) by mouth every 12 (twelve) hours as needed for moderate pain or severe pain (Patient not taking: Reported on 9/14/2023)     Current Facility-Administered Medications on File Prior to Visit   Medication    [COMPLETED] diphenhydrAMINE (BENADRYL) tablet 25 mg    [DISCONTINUED] acetaminophen (TYLENOL) tablet 650 mg    [DISCONTINUED] bisacodyl (DULCOLAX) EC tablet 5 mg    [DISCONTINUED] butalbital-acetaminophen-caffeine (FIORICET,ESGIC) -40 mg per tablet 1 tablet    [DISCONTINUED] dicyclomine (BENTYL) tablet 20 mg    [DISCONTINUED] diphenhydrAMINE (BENADRYL) injection 50 mg    [DISCONTINUED] enoxaparin (LOVENOX) subcutaneous injection 40 mg    [DISCONTINUED] famotidine (PEPCID) tablet 40 mg    [DISCONTINUED] HYDROmorphone (DILAUDID) tablet 4 mg    [DISCONTINUED] HYDROmorphone HCl (DILAUDID) injection 0.2 mg    [DISCONTINUED] hydrOXYzine HCL (ATARAX) tablet 15 mg    [DISCONTINUED] loperamide (IMODIUM) capsule 2 mg    [DISCONTINUED] LORazepam (ATIVAN)  "tablet 1 mg    [DISCONTINUED] methylPREDNISolone sodium succinate (Solu-MEDROL) injection 10 mg    [DISCONTINUED] mirtazapine (REMERON) tablet 15 mg    [DISCONTINUED] mometasone (ELOCON) 0.1 % cream    [DISCONTINUED] montelukast (SINGULAIR) tablet 10 mg    [DISCONTINUED] multi-electrolyte (PLASMALYTE-A/ISOLYTE-S PH 7.4) IV solution    [DISCONTINUED] ondansetron (ZOFRAN) injection 4 mg    [DISCONTINUED] oxybutynin (DITROPAN) tablet 5 mg    [DISCONTINUED] trimethobenzamide (TIGAN) IM injection 200 mg     She is allergic to aimovig [erenumab-aooe], amitriptyline, aspergillus allergy skin test, azathioprine, buspar [buspirone], citric acid-polysorbate 80, corn dextrin, erenumab, gadolinium, gelatin - food allergy, heparin, lavender oil, malto dextrin [dextrin], penicillium notatum, red dye - food allergy, sumatriptan, ustekinumab, lidocaine, contrast [iodinated contrast media], corn oil - food allergy - food allergy, humira [adalimumab], ibuprofen, polyethylene glycol, remicade [infliximab], sulfa antibiotics, sulfate, sulfites - food allergy, sweet corn extract skin test - food allergy, chlorhexidine, freederm adhesive remover [new skin], histamine, and wound dressings..    Review of Systems      Objective:      /85   Pulse 90   Ht 5' 5\" (1.651 m)   Wt 73.9 kg (163 lb)   BMI 27.12 kg/m²          Physical Exam      "

## 2024-03-07 NOTE — DISCHARGE SUMMARY
[facility]  Discharge- Celine Roa 1975, 49 y.o. female MRN: [id number]  Unit/Bed#: [place] [address] -01 Encounter: [id number]  Primary Care Provider: Olaf Rhodes MD   Date and time admitted to hospital: 2/13/2024  3:06 PM    * Right upper quadrant abdominal pain  Assessment & Plan  Prolong hospitalization due to ongoing abdominal pain  Status post ERCP with sphincterotomy; follow-up MRCP has shown stable findings.  Extensive discussions have taken place between gastroenterology team and gynecology.  Some consideration has been given to possibility of endometriosis as contributory to her symptoms, as well as Crohn's flare.  Currently on IV Solu-Medrol for treatment of potential Crohn's flare, but steroids have not improved her pain at all and GI is weaning her off of steroids.  As per gynecology on 3/1: Is to initiate management of endometriosis as an outpatient and no utility in starting while inpatient as time to improvement may be up to 2 weeks.     Diagnosis is pain from possible endometriosis, but that is not confirmed or clear    Patient is agreeable to go home on Dilaudid tablets.  I am going to refer her to pain management.    She is actually going to her gynecology appointment today to hopefully start treatment for possible endometriosis    Other possible etiologies were a Crohn's flare, but this is unlikely as steroids really did not help.  I will taper off steroids as an outpatient and have her follow-up with GI    Endometriosis  Assessment & Plan  This is currently the working diagnosis of the cause of her current abdominal pain.  Gynecology evaluated her but signed off.  They feel she needs outpatient treatment with Lupron.  They did not feel there would be any benefit to inpatient treatment    As above.  Patient is going to her gynecology appointment today    Crohn's colitis (HCC)  Assessment & Plan  History of Crohn's disease on Skyrizi as well as  methotrexate    She has nonspecific abdominal pain.  It is questionable if this is a Crohn's flare.  It is probably unlikely as steroids were really of no benefit to improving her pain.  I will taper off of steroids as an outpatient.  Have her follow-up with GI in the office.      Discharging Physician / Practitioner: Chirag Hanson DO  PCP: Olaf Rhodes MD  Admission Date:   Admission Orders (From admission, onward)       Ordered        02/13/24 1513  Inpatient Admission  Once                          Discharge Date: 03/07/24    Medical Problems       Resolved Problems  Date Reviewed: 3/3/2024            Resolved    Hypokalemia 2/22/2024     Resolved by  Vipul Partida MD    Sepsis without acute organ dysfunction (HCC) 2/22/2024     Resolved by  Anish Granados MD            Consultations During Hospital Stay:  GI  Gynecology  General surgery      Procedures Performed:     ERCP  CT abdomen      Reason for Admission: Ongoing abdominal pain      Hospital Course:     Celine Roa is a 49 y.o. female patient who originally presented to the hospital on 2/13/2024 due to ongoing abdominal pain.  Patient was transferred from [facility] for ongoing right upper quadrant abdominal pain.  She had been there about 7 days while they were working her up.  They felt her bile ducts were mildly dilated and that could be a problem with the sphincter of Oddi causing this pain.  So she was transferred here for ERCP.  ERCP with sphincterotomy was done but there was nothing really found on that procedure to explain her pain and her pain did not get better.  We then had her evaluated by GI, gynecology and general surgery.  No source for her pain was found.  Gynecology felt possibly could be endometriosis as she has had that in the past.  Their recommendation will be to do Lupron injections which they would start as an outpatient.  She is actually going to that appointment at the date of discharge.  There was  "consideration that this could be Crohn's flare as well.  But this is unlikely she was started on IV steroids and did not not have any improvement in her pain.  I will quickly taper her off the steroids.  She still needing a high amount of narcotics.  I did transition her over to oral Dilaudid from IV that which she was taking quite frequently for pain control.  Again to send her out on oral Dilaudid tablets and refer her to pain management.  I warned her that she should not drive or operate any machinery while taking the Dilaudid because it can make you very tired.    Please see above list of diagnoses and related plan for additional information.       Condition at Discharge: stable       Discharge Day Visit / Exam:     Subjective:  feels a little better  Abdomen is soft.        Vitals: Blood Pressure: 122/85 (03/07/24 0700)  Pulse: 90 (03/07/24 0700)  Temperature: 97.9 °F (36.6 °C) (03/07/24 0700)  Temp Source: Oral (03/06/24 2100)  Respirations: 16 (03/07/24 0700)  Height: 5' 5\" (165.1 cm) (02/26/24 2203)  Weight - Scale: 74.1 kg (163 lb 5.8 oz) (03/07/24 0541)  SpO2: 95 % (03/07/24 0700)    Exam:     Physical Exam  Eyes:      Extraocular Movements: Extraocular movements intact.      Pupils: Pupils are equal, round, and reactive to light.   Abdominal:      Comments: Soft.  Minimal RUQ pain  No rebound  No guarding  BS+   Musculoskeletal:      Right lower leg: No edema.      Left lower leg: No edema.       .         Discharge instructions/Information to patient and family:   See after visit summary for information provided to patient and family.      Provisions for Follow-Up Care:  See after visit summary for information related to follow-up care and any pertinent home health orders.      Disposition:     Home       Discharge Statement:  I spent 41 minutes discharging the patient. This time was spent on the day of discharge. I had direct contact with the patient on the day of discharge. Greater than 50% of the " total time was spent examining patient, answering all patient questions, arranging and discussing plan of care with patient as well as directly providing post-discharge instructions.  Additional time then spent on discharge activities.    Discharge Medications:  See after visit summary for reconciled discharge medications provided to patient and family.      ** Please Note: This note has been constructed using a voice recognition system **

## 2024-03-07 NOTE — CASE MANAGEMENT
Case Management Discharge Planning Note    Patient name Celine Roa  Location [facility] M* MRN [id number]  : 1975 Date 3/7/2024       Current Admission Date: 2024  Current Admission Diagnosis:Right upper quadrant abdominal pain   Patient Active Problem List    Diagnosis Date Noted    Oral phase dysphagia 2024    Pancreatitis 2024    Pollen-food allergy 2024    Oral allergy syndrome 2024    History of peripheral edema 2024    Hepatitis B antibody positive 2024    H/o Lyme disease 2024    Genetic disorder 2024    Endometriosis 2024    Anxiety 2024    Weight disorder 2024    Hepatitis 2024    Bladder spasm 02/10/2024    Intractable right upper quadrant abdominal pain 2024    Common variable immunodeficiency (HCC) 2024    Iron deficiency 2023    Iron deficiency anemia due to chronic blood loss 2023    Depression due to physical illness 2023    Circadian rhythm sleep disorder 2023    Insomnia due to medical condition 2023    Recurrent major depressive disorder, in partial remission ([facility]) 2023    Medical clearance for psychiatric admission 2023    Seasonal allergies 2023    GERD without esophagitis 2023    Major depressive disorder with psychotic features ([facility]) 2023    Benzodiazepine misuse 2023    Passive suicidal ideations 2023    Encephalopathy chronic 2023    Systemic lupus erythematosus, unspecified SLE type, unspecified organ involvement status (HCC) 2023    Bladder tumor 2023    Seizure ([facility])     Allergic reaction 2023    Idiopathic anaphylaxis 2023    Immunosuppression due to chronic steroid use  2022    Intractable migraine without status migrainosus 2022    Multiple allergies 2022    Crohn disease (HCC) 2022    Traumatic brain injury (HCC) 2022    Osteopenia 2022     Immunocompromised (HCC) 04/09/2022    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    Hiatal hernia 04/09/2022    Ulcerative colitis (HCC) 04/05/2022    Small fiber neuropathy 04/05/2022    Immunosuppression ([facility]) 04/05/2022    Atrial tachycardia 02/24/2022    Nephrolithiasis 12/06/2021    Anaphylactic reaction 12/04/2021    Intractable nausea and vomiting 12/02/2021    Heart palpitations 03/26/2021    Inflammatory bowel disease 03/07/2021    Current chronic use of systemic steroids 01/28/2021    MS (multiple sclerosis) ([facility]) 01/28/2021    Migraine     Overweight (BMI 25.0-29.9) 01/20/2021    Anorexia 10/17/2020    Crohn's colitis ([facility]) 10/17/2020    Hashimoto's disease 10/05/2020    Mild protein-calorie malnutrition ([facility]) 09/12/2019    Constipation 09/10/2019    Mass of left axilla 08/30/2019    Proctitis 08/26/2019    Essential (hemorrhagic) thrombocythemia ([facility])     Thrush 02/12/2019    Chronic back pain 02/12/2019    Primary localized osteoarthrosis of ankle and foot 06/04/2018    Fibromyalgia 12/28/2017    Meniere's disease 11/29/2017    Leukocytosis 09/10/2017    Hypomagnesemia 08/26/2017    Generalized anxiety disorder 08/26/2017    Vomiting and diarrhea 08/25/2017    Vitamin D deficiency 08/25/2017    Throat tightness 08/25/2017    Right upper quadrant abdominal pain 08/25/2017    Disorder of synovium 06/23/2017    Chondromalacia 05/12/2017    Chronic idiopathic urticaria 05/02/2017    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 [address] [address]  [facility] [address]  Phone: [phone] Fax: [phone]    [address] [address]  Phone: [phone] Fax: [phone]    Primary Care Provider: Olaf Rhodes MD    Primary Insurance: [employer]  Secondary Insurance: MEDICARE    DISCHARGE DETAILS:    IMM Given (Date):: 03/07/24  IMM Given to:: Patient     Additional Comments: IMM reviewed with pt at bedside who expresed understanding and agreement with discharge today. Copy at bedside, signed IMM in scan bin.

## 2024-03-07 NOTE — NURSING NOTE
Patient was d/c'd to home in no distress. No c/o pain, able to make needs known. Instructions were reviewed with the patient and spouse. Patient was made aware that prescription was sent to pharmacy of choice and 1 sent to [facility] . Patient walked to the front entrance in care of spouse. All belongings left with the patient.

## 2024-03-07 NOTE — ASSESSMENT & PLAN NOTE
Prolong hospitalization due to ongoing abdominal pain  Status post ERCP with sphincterotomy; follow-up MRCP has shown stable findings.  Extensive discussions have taken place between gastroenterology team and gynecology.  Some consideration has been given to possibility of endometriosis as contributory to her symptoms, as well as Crohn's flare.  Currently on IV Solu-Medrol for treatment of potential Crohn's flare, but steroids have not improved her pain at all and GI is weaning her off of steroids.  As per gynecology on 3/1: Is to initiate management of endometriosis as an outpatient and no utility in starting while inpatient as time to improvement may be up to 2 weeks.     Diagnosis is pain from possible endometriosis, but that is not confirmed or clear    Patient is agreeable to go home on Dilaudid tablets.  I am going to refer her to pain management.    She is actually going to her gynecology appointment today to hopefully start treatment for possible endometriosis    Other possible etiologies were a Crohn's flare, but this is unlikely as steroids really did not help.  I will taper off steroids as an outpatient and have her follow-up with GI

## 2024-03-07 NOTE — ASSESSMENT & PLAN NOTE
History of Crohn's disease on Skyrizi as well as methotrexate    She has nonspecific abdominal pain.  It is questionable if this is a Crohn's flare.  It is probably unlikely as steroids were really of no benefit to improving her pain.  I will taper off of steroids as an outpatient.  Have her follow-up with GI in the office.

## 2024-03-07 NOTE — PROGRESS NOTES
leuprolide 1 mg/0.2 mL        Sig: Inject 0.2 mL (1 mg total) under the skin in the morning        Initial Injection was given sub q to L abdominal area per patient request. Patient's spouse will administer the remaining injections.     LOT:  [id number], exp. 04/2024, NDC [address]-5    Patient provided her medication.

## 2024-03-07 NOTE — PLAN OF CARE
Problem: PAIN - ADULT  Goal: Verbalizes/displays adequate comfort level or baseline comfort level  Description: Interventions:  - Encourage patient to monitor pain and request assistance  - Assess pain using appropriate pain scale  - Administer analgesics based on type and severity of pain and evaluate response  - Implement non-pharmacological measures as appropriate and evaluate response  - Consider cultural and social influences on pain and pain management  - Notify physician/advanced practitioner if interventions unsuccessful or patient reports new pain  3/7/2024 1123 by Da Brownlee RN  Outcome: Adequate for Discharge  3/7/2024 1032 by Da Brownlee RN  Outcome: Progressing     Problem: Knowledge Deficit  Goal: Patient/family/caregiver demonstrates understanding of disease process, treatment plan, medications, and discharge instructions  Description: Complete learning assessment and assess knowledge base.  Interventions:  - Provide teaching at level of understanding  - Provide teaching via preferred learning methods  3/7/2024 1123 by Da Brownlee RN  Outcome: Adequate for Discharge  3/7/2024 1032 by Da Brownlee RN  Outcome: Progressing     Problem: GASTROINTESTINAL - ADULT  Goal: Minimal or absence of nausea and/or vomiting  Description: INTERVENTIONS:  - Administer IV fluids if ordered to ensure adequate hydration  - Maintain NPO status until nausea and vomiting are resolved  - Nasogastric tube if ordered  - Administer ordered antiemetic medications as needed  - Provide nonpharmacologic comfort measures as appropriate  - Advance diet as tolerated, if ordered  - Consider nutrition services referral to assist patient with adequate nutrition and appropriate food choices  3/7/2024 1123 by Da Brownlee RN  Outcome: Adequate for Discharge  3/7/2024 1032 by Da Brownlee RN  Outcome: Progressing  Goal: Maintains or returns to baseline bowel function  Description: INTERVENTIONS:  - Assess  bowel function  - Encourage oral fluids to ensure adequate hydration  - Administer IV fluids if ordered to ensure adequate hydration  - Administer ordered medications as needed  - Encourage mobilization and activity  - Consider nutritional services referral to assist patient with adequate nutrition and appropriate food choices  3/7/2024 1123 by Da Brownlee RN  Outcome: Adequate for Discharge  3/7/2024 1032 by Da Brownlee RN  Outcome: Progressing  Goal: Maintains adequate nutritional intake  Description: INTERVENTIONS:  - Monitor percentage of each meal consumed  - Identify factors contributing to decreased intake, treat as appropriate  - Assist with meals as needed  - Monitor I&O, weight, and lab values if indicated  - Obtain nutrition services referral as needed  3/7/2024 1123 by Da Brownlee RN  Outcome: Adequate for Discharge  3/7/2024 1032 by Da Brownlee RN  Outcome: Progressing     Problem: DISCHARGE PLANNING  Goal: Discharge to home or other facility with appropriate resources  Description: INTERVENTIONS:  - Identify barriers to discharge w/patient and caregiver  - Arrange for needed discharge resources and transportation as appropriate  - Identify discharge learning needs (meds, wound care, etc.)  - Arrange for interpretive services to assist at discharge as needed  - Refer to Case Management Department for coordinating discharge planning if the patient needs post-hospital services based on physician/advanced practitioner order or complex needs related to functional status, cognitive ability, or social support system  3/7/2024 1123 by Da Brownlee RN  Outcome: Adequate for Discharge  3/7/2024 1032 by Da Brownlee RN  Outcome: Progressing     Problem: METABOLIC, FLUID AND ELECTROLYTES - ADULT  Goal: Fluid balance maintained  Description: INTERVENTIONS:  - Monitor labs   - Monitor I/O and WT  - Instruct patient on fluid and nutrition as appropriate  - Assess for signs & symptoms  of volume excess or deficit  3/7/2024 1123 by Da Brownlee RN  Outcome: Adequate for Discharge  3/7/2024 1032 by Da Brownlee RN  Outcome: Progressing     Problem: Nutrition/Hydration-ADULT  Goal: Nutrient/Hydration intake appropriate for improving, restoring or maintaining nutritional needs  Description: Monitor and assess patient's nutrition/hydration status for malnutrition. Collaborate with interdisciplinary team and initiate plan and interventions as ordered.  Monitor patient's weight and dietary intake as ordered or per policy. Utilize nutrition screening tool and intervene as necessary. Determine patient's food preferences and provide high-protein, high-caloric foods as appropriate.     INTERVENTIONS:  - Monitor oral intake, urinary output, labs, and treatment plans  - Assess nutrition and hydration status and recommend course of action  - Evaluate amount of meals eaten  - Assist patient with eating if necessary   - Allow adequate time for meals  - Recommend/ encourage appropriate diets, oral nutritional supplements, and vitamin/mineral supplements  - Order, calculate, and assess calorie counts as needed  - Recommend, monitor, and adjust tube feedings and TPN/PPN based on assessed needs  - Assess need for intravenous fluids  - Provide specific nutrition/hydration education as appropriate  - Include patient/family/caregiver in decisions related to nutrition  3/7/2024 1123 by Da Brownlee RN  Outcome: Adequate for Discharge  3/7/2024 1032 by Da Brownlee RN  Outcome: Progressing

## 2024-03-07 NOTE — ASSESSMENT & PLAN NOTE
This is currently the working diagnosis of the cause of her current abdominal pain.  Gynecology evaluated her but signed off.  They feel she needs outpatient treatment with Lupron.  They did not feel there would be any benefit to inpatient treatment    As above.  Patient is going to her gynecology appointment today

## 2024-03-08 NOTE — UTILIZATION REVIEW
NOTIFICATION OF ADMISSION DISCHARGE   This is a Notification of Discharge from [facility]. Please be advised that this patient has been discharge from our facility. Below you will find the admission and discharge date and time including the patient’s disposition.   UTILIZATION REVIEW CONTACT:  Vita Santiago MA  Utilization   Network Utilization Review Department  Phone: [phone] x carefully listen to the prompts. All voicemails are confidential.  Email: [email or web address]     ADMISSION INFORMATION  PRESENTATION DATE: 2/13/2024  3:06 PM  OBERVATION ADMISSION DATE:   INPATIENT ADMISSION DATE: 2/13/24  3:06 PM   DISCHARGE DATE: 3/7/2024 11:30 AM   DISPOSITION:Home/Self Care    Network Utilization Review Department  ATTENTION: Please call with any questions or concerns to [phone] and carefully listen to the prompts so that you are directed to the right person. All voicemails are confidential.   For Discharge needs, contact Care Management DC Support Team at [phone] opt. 2  Send all requests for admission clinical reviews, approved or denied determinations and any other requests to dedicated fax number below belonging to the campus where the patient is receiving treatment. List of dedicated fax numbers for the Facilities:  FACILITY NAME UR FAX NUMBER   ADMISSION DENIALS (Administrative/Medical Necessity) [phone]   DISCHARGE SUPPORT TEAM ([facility]) [phone]   PARENT CHILD HEALTH (Maternity/NICU/Pediatrics) [phone]   [facility] [phone]   [facility] [phone]   [facility] [phone]   [facility] [phone]   [facility] [phone]   [facility] [phone]   [facility] [phone]   [facility]  [phone]   [facility] [phone]   [facility] [phone]   [facility] [phone]   [facility] [phone]

## 2024-03-11 ENCOUNTER — TELEPHONE (OUTPATIENT)
Dept: FAMILY MEDICINE CLINIC | Facility: CLINIC | Age: 49
End: 2024-03-11

## 2024-03-11 NOTE — TELEPHONE ENCOUNTER
Patient called looking to schedule an appt with you. Not sure where I can get her in since you are booked out until April. She prefers to see you.

## 2024-03-14 DIAGNOSIS — L50.1 CHRONIC IDIOPATHIC URTICARIA: Primary | ICD-10-CM

## 2024-03-15 ENCOUNTER — TELEPHONE (OUTPATIENT)
Dept: FAMILY MEDICINE CLINIC | Facility: CLINIC | Age: 49
End: 2024-03-15

## 2024-03-15 ENCOUNTER — TELEPHONE (OUTPATIENT)
Dept: GASTROENTEROLOGY | Facility: CLINIC | Age: 49
End: 2024-03-15

## 2024-03-15 DIAGNOSIS — K50.119 CROHN'S DISEASE OF COLON WITH COMPLICATION (HCC): Primary | ICD-10-CM

## 2024-03-15 NOTE — TELEPHONE ENCOUNTER
Hi, my name is Lizbet. I'm a care coordinator at [facility]. I'm calling in regards to Aleah Roa, Date of birth 1975. She is a patient of Doctor Holden Gill. I'm calling in regards to her recent hospital stay and the doctor. Na, her hematologist, would like to see notes regarding her most recent hospital stay at [facility]. So if you could fax over her discharge summary, recent labs, or any scans that were performed while she was admitted, that would be great. Our fax number is [phone] and again, I'm calling in regards to the Celine Roa date of birth 1975 Thank you.      Can someone send labs and d/c summary I printed? Thanks!

## 2024-03-15 NOTE — TELEPHONE ENCOUNTER
I spoke to Celine venegas. IgE corn testing.  She asked me to document that she was taking anti-histamines at the time of the blood draw, and she believes that this may have affected the test and produced a false-negative result.  She was told that she has to be off antihistamines before accurate IgE testing can be done.      I am not an expert on the issue.  My opinion is that, since there was no detectable corn IgE in her blood (regardless of the cause), she could not have had an IgE-mediated reaction to corn products while in the hospital.  However, I will defer to her Immunologist.

## 2024-03-19 ENCOUNTER — HOSPITAL ENCOUNTER (OUTPATIENT)
Dept: INFUSION CENTER | Facility: HOSPITAL | Age: 49
Discharge: HOME | End: 2024-03-19
Attending: INTERNAL MEDICINE
Payer: COMMERCIAL

## 2024-03-19 ENCOUNTER — OFFICE VISIT (OUTPATIENT)
Dept: UROLOGY | Facility: CLINIC | Age: 49
End: 2024-03-19
Payer: COMMERCIAL

## 2024-03-19 VITALS
HEIGHT: 65 IN | OXYGEN SATURATION: 97 % | DIASTOLIC BLOOD PRESSURE: 86 MMHG | WEIGHT: 156 LBS | SYSTOLIC BLOOD PRESSURE: 128 MMHG | BODY MASS INDEX: 25.99 KG/M2 | HEART RATE: 96 BPM

## 2024-03-19 VITALS
HEART RATE: 98 BPM | TEMPERATURE: 98.8 F | SYSTOLIC BLOOD PRESSURE: 138 MMHG | DIASTOLIC BLOOD PRESSURE: 84 MMHG | RESPIRATION RATE: 17 BRPM | OXYGEN SATURATION: 97 %

## 2024-03-19 DIAGNOSIS — E44.1 MILD PROTEIN-CALORIE MALNUTRITION (HCC): ICD-10-CM

## 2024-03-19 DIAGNOSIS — L50.1 CHRONIC IDIOPATHIC URTICARIA: Primary | ICD-10-CM

## 2024-03-19 DIAGNOSIS — R11.10 VOMITING AND DIARRHEA: ICD-10-CM

## 2024-03-19 DIAGNOSIS — D50.0 IRON DEFICIENCY ANEMIA DUE TO CHRONIC BLOOD LOSS: ICD-10-CM

## 2024-03-19 DIAGNOSIS — R19.7 VOMITING AND DIARRHEA: ICD-10-CM

## 2024-03-19 DIAGNOSIS — E61.1 IRON DEFICIENCY: ICD-10-CM

## 2024-03-19 DIAGNOSIS — M62.89 PELVIC FLOOR DYSFUNCTION IN FEMALE: Primary | ICD-10-CM

## 2024-03-19 DIAGNOSIS — R63.0 ANOREXIA: ICD-10-CM

## 2024-03-19 PROCEDURE — 96375 TX/PRO/DX INJ NEW DRUG ADDON: CPT

## 2024-03-19 PROCEDURE — 96367 TX/PROPH/DG ADDL SEQ IV INF: CPT

## 2024-03-19 PROCEDURE — 99214 OFFICE O/P EST MOD 30 MIN: CPT | Performed by: UROLOGY

## 2024-03-19 PROCEDURE — 96372 THER/PROPH/DIAG INJ SC/IM: CPT

## 2024-03-19 PROCEDURE — 96361 HYDRATE IV INFUSION ADD-ON: CPT

## 2024-03-19 PROCEDURE — 96365 THER/PROPH/DIAG IV INF INIT: CPT

## 2024-03-19 RX ORDER — METHYLPREDNISOLONE SODIUM SUCCINATE 40 MG/ML
40 INJECTION, POWDER, LYOPHILIZED, FOR SOLUTION INTRAMUSCULAR; INTRAVENOUS ONCE
Status: COMPLETED | OUTPATIENT
Start: 2024-03-19 | End: 2024-03-19

## 2024-03-19 RX ORDER — HYOSCYAMINE SULFATE 0.125 MG
0.12 TABLET ORAL EVERY 6 HOURS PRN
COMMUNITY
Start: 2024-03-18

## 2024-03-19 RX ORDER — EPINEPHRINE 1 MG/ML
0.3 INJECTION, SOLUTION, CONCENTRATE INTRAVENOUS
OUTPATIENT
Start: 2024-03-22

## 2024-03-19 RX ORDER — METHYLPREDNISOLONE SODIUM SUCCINATE 40 MG/ML
40 INJECTION, POWDER, LYOPHILIZED, FOR SOLUTION INTRAMUSCULAR; INTRAVENOUS ONCE
Status: CANCELLED
Start: 2024-03-21 | End: 2024-03-20

## 2024-03-19 RX ORDER — EPINEPHRINE 1 MG/ML
0.3 INJECTION, SOLUTION, CONCENTRATE INTRAVENOUS
Status: DISCONTINUED | OUTPATIENT
Start: 2024-03-19 | End: 2024-03-22 | Stop reason: HOSPADM

## 2024-03-19 RX ADMIN — OMALIZUMAB 300 MG: 150 INJECTION, SOLUTION SUBCUTANEOUS at 16:51

## 2024-03-19 RX ADMIN — DIPHENHYDRAMINE HYDROCHLORIDE 50 MG: 50 INJECTION, SOLUTION INTRAMUSCULAR; INTRAVENOUS at 15:16

## 2024-03-19 RX ADMIN — METHYLPREDNISOLONE SODIUM SUCCINATE 40 MG: 40 INJECTION, POWDER, FOR SOLUTION INTRAMUSCULAR; INTRAVENOUS at 16:20

## 2024-03-19 RX ADMIN — FAMOTIDINE 40 MG: 10 INJECTION INTRAVENOUS at 15:57

## 2024-03-19 RX ADMIN — SODIUM CHLORIDE 1000 ML: 0.9 INJECTION, SOLUTION INTRAVENOUS at 14:35

## 2024-03-19 NOTE — PROGRESS NOTES
Celine Roa  tolerated treatment well with no complications.      Celine Roa is aware of future appt on 3/21/24 at 1.     AVS printed and given to Celine Roa:    No (Declined by Celine Roa) x

## 2024-03-19 NOTE — PROGRESS NOTES
"UROLOGY PROGRESS NOTE   [facility]  [address]  [address]  [place] [address]  [phone]  Fax:[phone]  [email or web address]      NAME: Celine Roa  AGE: 49 y.o. 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  I have seen this before.  She also has multiple other problems such as chronic abdominal pain Crohn's disease etc.  I do not have records of actual infections and I doubt that she is actually having true urinary tract infections.  Her urinalysis is negative, renal ultrasound done recently looks good and I think her major problem is pelvic floor dysfunction right now which is not unexpected.  This also may play a role in her bowel issues.  Therefore we will send her to physical therapy for pelvic health.  From my standpoint neurologically she is clear and if she does develop recurrent urinary tract infections we will have to deal with that but we will need cultures.  She can follow-up with me as needed.               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  She had a lot of pain and discomfort after the procedure. Had difficulty voiding in July, straight cathed for 600 cc.Also has bilateral nephrolithiasis.  She was seen by us in consultation when hospitalized for right upper quadrant abdominal pain and stranguria.  Bladder scans were " "negligible.  Ditropan was started.  Last CT scan February 15, 2024 showed small bilateral nonobstructing stones. She complains of recurrent UTIs, but I have no record of urine cultures. Cultures were ordered in July, but status says \"Active\". Started on Lupron for ovarian cysts 2 weeks ago. The hesitancy and difficulty voiding started after the procedure I performed.    She currently feels incomplete bladder emptying, has to double or triple void, and hesitancy.and last Urine micro 2/15 was moderate bacteria, but otherwise negative.Bladder and kidney US 2/9/24, normal, bladder nondistended.Currently does not feel infected.      The following portions of the patient's history were reviewed and updated as appropriate: allergies, current medications, past family history, past medical history, past social history, past surgical history and problem list.  Past Medical History:   Diagnosis Date    Anemia 2007    Anxiety     Asthma     Bile duct stricture 2014    Sphincter of oddi dysfunction    Chronic kidney disease     Closed bimalleolar fracture 02/22/2024    Colon polyp 1998    Crohn's colitis (HCC)     Cyst of ovary 02/22/2024    Deep vein thrombosis (HCC)     Disease of thyroid gland     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 pt denies, documented in cardio note    Pain of right thigh 02/13/2023    Pancreatitis     sphinger of     Psychiatric disorder     RA (rheumatoid arthritis) (HCC)     Seizures (HCC)     Sepsis without acute organ dysfunction (HCC)     SIRS (systemic inflammatory response syndrome) (HCC)     Thrombocytosis 04/05/2022    Ulcerative colitis (HCC)     Visual impairment  "     Past Surgical History:   Procedure Laterality Date    ABDOMINAL SURGERY  2017    APPENDECTOMY      CHOLECYSTECTOMY      COLONOSCOPY  2019    CYSTOSCOPY N/A 6/8/2023    Procedure: CYSTOSCOPY, mini TURBT with fulgeration;  Surgeon: Tee Bruno MD;  Location: [facility];  Service: Urology    EGD AND COLONOSCOPY N/A 09/12/2017    Procedure: EGD AND COLONOSCOPY;  Surgeon: Tommy Oliver MD;  Location:  GI LAB;  Service: Gastroenterology    ERCP N/A 09/15/2017    Procedure: ENDOSCOPIC RETROGRADE CHOLANGIOPANCREATOGRAPHY (ERCP);  Surgeon: Dre Soto MD;  Location: [facility];  Service: Gastroenterology    HYSTERECTOMY      KNEE SURGERY Right     LAPAROSCOPIC ENDOMETRIOSIS FULGURATION      ORIF ANKLE FRACTURE Left     UT ARTHRS KNE SURG W/MENISCECTOMY MED/LAT W/SHVG Left 05/12/2017    Procedure: POSSIBLE MEDIAL MENISECTOMY;  Surgeon: Kristian Avila MD;  Location: [facility];  Service: Orthopedics    UT ARTHRS KNEE DEBRIDEMENT/SHAVING ARTCLR CRTLG Left 05/12/2017    Procedure: KNEE ARTHROSCOPY EVALUATION, CHONDROPLASTY;  Surgeon: Kristian Avila MD;  Location: [facility];  Service: Orthopedics    UT LAPS ABD PRTM&OMENTUM DX W/WO SPEC BR/WA SPX N/A 12/12/2017    Procedure: LAPAROSCOPY DIAGNOSTIC  WITH LYSIS OF ADHESIONS;  Surgeon: Olaf John DO;  Location:  MAIN OR;  Service: General    UPPER GASTROINTESTINAL ENDOSCOPY  2019     shoulder  Review of Systems   Review of Systems   Gastrointestinal:         Problems with crohns- and abdominal pain, slow motility   Genitourinary:         As per hpi       Active Problem List     Patient Active Problem List   Diagnosis    Chondromalacia    Vomiting and diarrhea    Vitamin D deficiency    Throat tightness    Right upper quadrant abdominal pain    Hypomagnesemia    Generalized anxiety disorder    Acquired hypothyroidism    Leukocytosis    Fibromyalgia    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HCC)    Meniere's disease    Chronic idiopathic urticaria    Disorder of synovium    Primary  localized osteoarthrosis of ankle and foot    Thrush    Chronic back pain    Essential (hemorrhagic) thrombocythemia (HCC)    Proctitis    Mass of left axilla    Constipation    Mild protein-calorie malnutrition (HCC)    Anorexia    Crohn's colitis (HCC)    Overweight (BMI 25.0-29.9)    Current chronic use of systemic steroids    Migraine    MS (multiple sclerosis) (HCC)    Inflammatory bowel disease    Heart palpitations    Intractable nausea and vomiting    Anaphylactic reaction    Nephrolithiasis    Atrial tachycardia    Immunosuppression due to chronic steroid use     Seizure (HCC)    Bladder tumor    Systemic lupus erythematosus, unspecified SLE type, unspecified organ involvement status (HCC)    Encephalopathy chronic    Passive suicidal ideations    Medical clearance for psychiatric admission    Seasonal allergies    GERD without esophagitis    Major depressive disorder with psychotic features (HCC)    Benzodiazepine misuse    Recurrent major depressive disorder, in partial remission (HCC)    Circadian rhythm sleep disorder    Insomnia due to medical condition    Iron deficiency    Iron deficiency anemia due to chronic blood loss    Depression due to physical illness    Common variable immunodeficiency (HCC)    Intractable right upper quadrant abdominal pain    Bladder spasm    Oral phase dysphagia    Intractable migraine without status migrainosus    Ulcerative colitis (HCC)    Pancreatitis    Multiple allergies    Idiopathic anaphylaxis    Crohn disease (HCC)    Allergic reaction    Traumatic brain injury (HCC)    Small fiber neuropathy    Pollen-food allergy    Oral allergy syndrome    Osteopenia    Immunocompromised (HCC)    History of peripheral edema    Hereditary alpha tryptasemia (HCC)    Hepatitis B antibody positive    Hashimoto's disease    H/o Lyme disease    Genetic disorder    Endometriosis    Anxiety    Weight disorder    Hepatitis    Hiatal hernia    Immunosuppression (HCC)       Objective  "  /86   Pulse 96   Ht 5' 5\" (1.651 m)   Wt 70.8 kg (156 lb)   SpO2 97%   BMI 25.96 kg/m²     Physical Exam  Vitals reviewed.   Constitutional:       Appearance: Normal appearance. She is normal weight.   HENT:      Head: Normocephalic and atraumatic.   Eyes:      Extraocular Movements: Extraocular movements intact.   Pulmonary:      Effort: Pulmonary effort is normal.   Musculoskeletal:         General: Normal range of motion.      Cervical back: Normal range of motion.   Skin:     Coloration: Skin is not jaundiced or pale.   Neurological:      General: No focal deficit present.      Mental Status: She is alert and oriented to person, place, and time. Mental status is at baseline.   Psychiatric:         Mood and Affect: Mood normal.         Behavior: Behavior normal.         Thought Content: Thought content normal.         Judgment: Judgment normal.             Current Medications     Current Outpatient Medications:     Ascorbic Acid (vitamin C) 1000 MG tablet, Take 1,000 mg by mouth 2 (two) times a day, Disp: , Rfl:     B-D 3CC LUER-SHAMEKA SYR 25GX1\" 25G X 1\" 3 ML MISC, , Disp: , Rfl:     butalbital-acetaminophen-caffeine (FIORICET,ESGIC) -40 mg per tablet, Take 1 tablet by mouth every 8 (eight) hours as needed for migraine, Disp: 20 tablet, Rfl: 1    cetirizine (ZyrTEC) 10 mg tablet, Take 20 mg by mouth daily, Disp: , Rfl:     cholecalciferol (VITAMIN D3) 1,000 units tablet, Take 1,000 Units by mouth 2 (two) times a day, Disp: , Rfl:     cyanocobalamin (VITAMIN B-12) 100 mcg tablet, Take by mouth daily, Disp: , Rfl:     diphenhydrAMINE (BENADRYL) 50 mg capsule, Take 50 mg by mouth 4 (four) times a day as needed for itching Pt states takes 50mg daily, but may go up to 4 times daily prn, Disp: , Rfl:     famotidine (PEPCID) 40 MG tablet, TAKE ONE TABLET BY MOUTH ONCE DAILY, Disp: 90 tablet, Rfl: 1    folic acid (KP Folic Acid) 1 mg tablet, Take 1 tablet (1 mg total) by mouth daily, Disp: 30 tablet, " "Rfl: 3    hydrOXYzine HCL (ATARAX) 25 mg tablet, Take 15 mg by mouth daily, Disp: , Rfl:     hyoscyamine (ANASPAZ,LEVSIN) 0.125 MG tablet, Take 0.125 mg by mouth every 6 (six) hours as needed, Disp: , Rfl:     Lactobacillus (PROBIOTIC ACIDOPHILUS PO), Take by mouth, Disp: , Rfl:     Lactobacillus-Inulin ([facility]) CAPS, Take 200 mg by mouth daily, Disp: , Rfl:     leuprolide 1 mg/0.2 mL, Inject 0.2 mL (1 mg total) under the skin in the morning, Disp: 60 each, Rfl: 0    levalbuterol (XOPENEX HFA) 45 mcg/act inhaler, Inhale 1-2 puffs every 4 (four) hours as needed for shortness of breath, Disp: 45 g, Rfl: 2    levothyroxine 100 mcg tablet, Take 75 mcg by mouth every other day, Disp: , Rfl:     liothyronine (CYTOMEL) 5 mcg tablet, Take 1 tablet (5 mcg total) by mouth daily Do not start before June 14, 2023., Disp: 30 tablet, Rfl: 0    lisinopril (ZESTRIL) 10 mg tablet, , Disp: , Rfl:     magnesium gluconate (MAGONATE) 500 mg tablet, Take 500 mg by mouth 2 (two) times a day, Disp: , Rfl:     Melatonin 3 MG SUBL, 6 mg daily at bedtime, Disp: , Rfl:     methotrexate 50 MG/2ML injection, Inject 1 mL (25 mg total) under the skin once a week, Disp: 4 mL, Rfl: 10    mirtazapine (REMERON) 15 mg tablet, Take 1 tablet (15 mg total) by mouth daily at bedtime, Disp: 90 tablet, Rfl: 1    montelukast (SINGULAIR) 10 mg tablet, Take 1 tablet (10 mg total) by mouth daily at bedtime, Disp: , Rfl: 0    NEEDLE, DISP, 27 G (B-D DISP NEEDLE 51TE0-2/4\") 27G X 1-1/4\" MISC, Use once a week, Disp: 100 each, Rfl: 0    oxybutynin (DITROPAN) 5 mg tablet, Take 1 tablet (5 mg total) by mouth 2 (two) times a day as needed (bladder pain), Disp: 10 tablet, Rfl: 0    predniSONE 20 mg tablet, Take 40mg per day for 1 week, then 30mg per day for 1 week, then 20mg per day for 1 week and then 10mg per day for 1 week., Disp: 100 tablet, Rfl: 0    Riboflavin (VITAMIN B-2 PO), Take by mouth, Disp: , Rfl:     Riboflavin (Vitamin B-2) 100 MG " TABS, Take 100 mg by mouth 2 (two) times a day, Disp: , Rfl:     risankizumab-rzaa (Skyrizi) 360 MG/2.4ML SOCT, Take first on body injector (OBI) under skin on week 12 then every 8 weeks there after, Disp: 2.4 mL, Rfl: 5    TechLite Lancets MISC, , Disp: , Rfl:     LORazepam (ATIVAN) 1 mg tablet, Take 1 tablet (1 mg total) by mouth every 8 (eight) hours as needed for anxiety (abdominal pain) for up to 10 days Caution may cause sedation.   Do not take at the same time as dilaudid, Disp: 10 tablet, Rfl: 0        Tee Bruno MD

## 2024-03-20 ENCOUNTER — OFFICE VISIT (OUTPATIENT)
Dept: CARDIOLOGY CLINIC | Facility: HOSPITAL | Age: 49
End: 2024-03-20
Payer: COMMERCIAL

## 2024-03-20 VITALS
HEIGHT: 65 IN | DIASTOLIC BLOOD PRESSURE: 78 MMHG | WEIGHT: 159 LBS | BODY MASS INDEX: 26.49 KG/M2 | SYSTOLIC BLOOD PRESSURE: 122 MMHG | HEART RATE: 94 BPM

## 2024-03-20 DIAGNOSIS — D89.40 MAST CELL ACTIVATION SYNDROME (HCC): ICD-10-CM

## 2024-03-20 DIAGNOSIS — I47.19 ATRIAL TACHYCARDIA: Primary | ICD-10-CM

## 2024-03-20 PROCEDURE — 99214 OFFICE O/P EST MOD 30 MIN: CPT | Performed by: INTERNAL MEDICINE

## 2024-03-21 ENCOUNTER — HOSPITAL ENCOUNTER (OUTPATIENT)
Dept: INFUSION CENTER | Facility: HOSPITAL | Age: 49
Discharge: HOME | End: 2024-03-21
Attending: INTERNAL MEDICINE
Payer: COMMERCIAL

## 2024-03-21 ENCOUNTER — OFFICE VISIT (OUTPATIENT)
Dept: FAMILY MEDICINE CLINIC | Facility: HOME HEALTHCARE | Age: 49
End: 2024-03-21
Payer: COMMERCIAL

## 2024-03-21 VITALS
HEART RATE: 101 BPM | HEIGHT: 65 IN | BODY MASS INDEX: 25.99 KG/M2 | DIASTOLIC BLOOD PRESSURE: 98 MMHG | WEIGHT: 156 LBS | TEMPERATURE: 98.9 F | OXYGEN SATURATION: 97 % | RESPIRATION RATE: 18 BRPM | SYSTOLIC BLOOD PRESSURE: 132 MMHG

## 2024-03-21 VITALS
RESPIRATION RATE: 18 BRPM | SYSTOLIC BLOOD PRESSURE: 142 MMHG | HEART RATE: 97 BPM | TEMPERATURE: 97.1 F | DIASTOLIC BLOOD PRESSURE: 85 MMHG

## 2024-03-21 DIAGNOSIS — E61.1 IRON DEFICIENCY: ICD-10-CM

## 2024-03-21 DIAGNOSIS — R63.0 ANOREXIA: ICD-10-CM

## 2024-03-21 DIAGNOSIS — L50.9 URTICARIA: ICD-10-CM

## 2024-03-21 DIAGNOSIS — G89.29 CHRONIC RUQ PAIN: Primary | ICD-10-CM

## 2024-03-21 DIAGNOSIS — R53.81 PHYSICAL DECONDITIONING: ICD-10-CM

## 2024-03-21 DIAGNOSIS — R19.7 VOMITING AND DIARRHEA: Primary | ICD-10-CM

## 2024-03-21 DIAGNOSIS — E44.1 MILD PROTEIN-CALORIE MALNUTRITION (HCC): ICD-10-CM

## 2024-03-21 DIAGNOSIS — R11.10 VOMITING AND DIARRHEA: Primary | ICD-10-CM

## 2024-03-21 DIAGNOSIS — D50.0 IRON DEFICIENCY ANEMIA DUE TO CHRONIC BLOOD LOSS: ICD-10-CM

## 2024-03-21 DIAGNOSIS — R10.11 CHRONIC RUQ PAIN: Primary | ICD-10-CM

## 2024-03-21 PROCEDURE — 99213 OFFICE O/P EST LOW 20 MIN: CPT

## 2024-03-21 PROCEDURE — 96365 THER/PROPH/DIAG IV INF INIT: CPT

## 2024-03-21 PROCEDURE — 96361 HYDRATE IV INFUSION ADD-ON: CPT

## 2024-03-21 PROCEDURE — 96375 TX/PRO/DX INJ NEW DRUG ADDON: CPT

## 2024-03-21 RX ORDER — MOMETASONE FUROATE 1 MG/G
CREAM TOPICAL DAILY
Qty: 45 G | Refills: 1 | Status: SHIPPED | OUTPATIENT
Start: 2024-03-21

## 2024-03-21 RX ORDER — METHYLPREDNISOLONE SODIUM SUCCINATE 40 MG/ML
40 INJECTION, POWDER, LYOPHILIZED, FOR SOLUTION INTRAMUSCULAR; INTRAVENOUS ONCE
Status: CANCELLED
Start: 2024-03-26 | End: 2024-03-22

## 2024-03-21 RX ORDER — METHYLPREDNISOLONE SODIUM SUCCINATE 40 MG/ML
40 INJECTION, POWDER, LYOPHILIZED, FOR SOLUTION INTRAMUSCULAR; INTRAVENOUS ONCE
Status: COMPLETED | OUTPATIENT
Start: 2024-03-21 | End: 2024-03-21

## 2024-03-21 RX ADMIN — SODIUM CHLORIDE 1000 ML: 0.9 INJECTION, SOLUTION INTRAVENOUS at 13:32

## 2024-03-21 RX ADMIN — DIPHENHYDRAMINE HYDROCHLORIDE 50 MG: 50 INJECTION, SOLUTION INTRAMUSCULAR; INTRAVENOUS at 13:32

## 2024-03-21 RX ADMIN — FAMOTIDINE 40 MG: 10 INJECTION, SOLUTION INTRAVENOUS at 14:02

## 2024-03-21 RX ADMIN — METHYLPREDNISOLONE SODIUM SUCCINATE 40 MG: 40 INJECTION, POWDER, FOR SOLUTION INTRAMUSCULAR; INTRAVENOUS at 13:32

## 2024-03-21 NOTE — PROGRESS NOTES
Cardiology Follow Up    Celine Roa  1975  [id number]  [facility] [address] [address]    1. Atrial tachycardia        2. 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HCC)            Discussion/Summary: She had a prolonged hospitalization for abdominal pain no active cardiac symptoms.  She had a brief episode of atrial tachycardia overall doing well and stable from a cardiac standpoint echocardiogram was reviewed.  No further testing I will see her back in a year.      Interval History:  48-year-old female I met in the hospital from iatrogenic fluid overload.  Overall she has been doing well from a cardiac standpoint.  We ordered a echocardiogram last year which showed normal diastolic parameters and stress echo showed no evidence of ischemia.  She has 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.  From a cardiac standpoint she has no anginal symptoms.  She has no lower extremity edema, PND, orthopnea.  His been no palpitations.    She was in the hospital for close to a month with abdominal pain and discomfort underwent ERCP.  Cardiac status has been stable she denies any chest pain, shortness of breath, palpitations, lightheadedness, dizziness, or syncope.    Problem List       Chondromalacia    Crohn's colitis (HCC)    Ulcerative colitis (HCC)    Vitamin D deficiency    Hypokalemia    Hypotension    Throat tightness    Septic shock (HCC)    Bradycardia    Abdominal pain    Headache    Hypophosphatemia    Hypomagnesemia    Generalized anxiety disorder (Chronic)    Hypothyroidism (Chronic)    Low TSH level    Hypocalcemia    Hyperglycemia    Acute respiratory failure with hypoxia (HCC)    Bilateral pleural effusion    Acute cardiogenic pulmonary edema (HCC)    Elevated troponin level    Abnormal CT of the abdomen    Leukocytosis    Arm swelling     I called her with her final pathology report that demonstrated negative nodes, and the pathologic complete response of the invasive carcinoma.  She had a small amount of ductal carcinoma in situ left it was within a millimeter of the posterior margin which is muscle.  She has an appointment to see me on the 12th.  She needs her echocardiogram downloaded so that Dr. Richards is going to be able to look at it and I told her to bring the report on her postop visit and we will scan it into the system.  We will also try to get her an appointment with Dr. Brice as soon as possible.   Thrombophlebitis    Dysuria    Anxiety    CHF (congestive heart failure) (HCC)    Fibromyalgia    Mast cell disorder    Meniere disease    Visual disturbance    Chronic idiopathic urticaria    Disorder of synovium          Past Medical History:   Diagnosis Date    Anemia 2007    Anxiety     Asthma     Bile duct stricture 2014    Sphincter of oddi dysfunction    Chronic kidney disease     Closed bimalleolar fracture 02/22/2024    Colon polyp 1998    Crohn's colitis (HCC)     Cyst of ovary 02/22/2024    Deep vein thrombosis (HCC)     Disease of thyroid gland     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 pt denies, documented in cardio note    Pain of right thigh 02/13/2023    Pancreatitis     sphinger of     Psychiatric disorder     RA (rheumatoid arthritis) (HCC)     Seizures (HCC)     Sepsis without acute organ dysfunction (HCC)     SIRS (systemic inflammatory response syndrome) (HCC)     Thrombocytosis 04/05/2022    Ulcerative colitis (HCC)     Visual impairment      Social History     Socioeconomic History    Marital status: /Civil Union     Spouse name: Not on file    Number of children: Not on file    Years of education: Not on file    Highest education level: Not on file   Occupational History    Not on file   Tobacco Use    Smoking status: Never    Smokeless tobacco: Never   Vaping Use    Vaping status: Never Used   Substance and Sexual Activity    Alcohol use: Never    Drug use: Never    Sexual activity: Not Currently     Partners: Male     Birth control/protection: Other     Comment: Full hysterectomy   Other Topics Concern    Not on file   Social History Narrative    Not on file      Social Determinants of Health     Financial Resource Strain: Low Risk  (2/6/2023)    Overall Financial Resource Strain (CARDIA)     Difficulty of Paying Living Expenses: Not very hard   Food Insecurity: No Food Insecurity (2/17/2024)    Hunger Vital Sign     Worried About Running Out of Food in the Last Year: Never true     Ran Out of Food in the Last Year: Never true   Transportation Needs: No Transportation Needs (2/17/2024)    PRAPARE - Transportation     Lack of Transportation (Medical): No     Lack of Transportation (Non-Medical): No   Physical Activity: Inactive (11/5/2019)    Received from [facility]    Exercise Vital Sign     Days of Exercise per Week: 0 days     Minutes of Exercise per Session: 0 min   Stress: Stress Concern Present (11/5/2019)    Received from [facility]    [employer] [employer] - Occupational Stress Questionnaire     Feeling of Stress : To some extent   Social Connections: Socially Integrated (11/5/2019)    Received from [facility]    Social Connection and Isolation Panel [NHANES]     Frequency of Communication with Friends and Family: Three times a week     Frequency of Social Gatherings with Friends and Family: Once a week     Attends Oriental orthodox Services: 1 to 4 times per year     Active Member of Clubs or Organizations: Yes     Attends Club or Organization Meetings: 1 to 4 times per year     Marital Status:    Intimate Partner Violence: Not At Risk (11/5/2019)    Received from [facility]    Humiliation, Afraid, Rape, and Kick questionnaire     Fear of Current or Ex-Partner: No     Emotionally Abused: No     Physically Abused: No     Sexually Abused: No   Housing Stability: Low Risk  (2/17/2024)    Housing Stability Vital Sign     Unable to Pay for Housing in the Last Year: No     Number of Places Lived in the Last Year: 1     Unstable Housing in the Last Year: No      Family History   Problem Relation Age of Onset    Ulcerative colitis Mother      Arthritis Mother     Colon polyps Mother     Heart failure Father     Neuropathy Father     Macular degeneration Father     Diabetes Father     Early death Father     Vision loss Father     Asthma Daughter     Hypothyroidism Daughter     Heart disease Maternal Grandmother     Arthritis Maternal Grandmother     No Known Problems Maternal Grandfather     Arthritis Paternal Grandmother     Macular degeneration Paternal Grandmother     Vision loss Paternal Grandmother     Lymphoma Paternal Grandfather     Cancer Paternal Grandfather     Early death Paternal Grandfather     Breast cancer Maternal Aunt     Cancer Maternal Aunt     Miscarriages / Stillbirths Maternal Aunt     Heart failure Paternal Aunt     Heart failure Paternal Aunt     Irritable bowel syndrome Paternal Aunt     Breast cancer Paternal Aunt 54    Breast cancer additional onset Paternal Aunt 58    Hypothyroidism Paternal Aunt     Cancer Paternal Aunt     No Known Problems Son     No Known Problems Son     Kidney disease Brother     Depression Paternal Uncle     Early death Paternal Uncle      Past Surgical History:   Procedure Laterality Date    ABDOMINAL SURGERY  2017    APPENDECTOMY      CHOLECYSTECTOMY      COLONOSCOPY  2019    CYSTOSCOPY N/A 6/8/2023    Procedure: CYSTOSCOPY, mini TURBT with fulgeration;  Surgeon: Tee Bruno MD;  Location: [facility];  Service: Urology    EGD AND COLONOSCOPY N/A 09/12/2017    Procedure: EGD AND COLONOSCOPY;  Surgeon: Tommy Oliver MD;  [facility]: [facility];  Service: Gastroenterology    ERCP N/A 09/15/2017    Procedure: ENDOSCOPIC RETROGRADE CHOLANGIOPANCREATOGRAPHY (ERCP);  Surgeon: Dre Soto MD;  [facility];  Service: Gastroenterology    HYSTERECTOMY      KNEE SURGERY Right     LAPAROSCOPIC ENDOMETRIOSIS FULGURATION      ORIF ANKLE FRACTURE Left     OK ARTHRS KNE SURG W/MENISCECTOMY MED/LAT W/SHVG Left 05/12/2017    Procedure: POSSIBLE MEDIAL MENISECTOMY;  Surgeon: Kristian Avila MD;  Location: [facility];   "Service: Orthopedics    MN ARTHRS KNEE DEBRIDEMENT/SHAVING ARTCLR CRTLG Left 05/12/2017    Procedure: KNEE ARTHROSCOPY EVALUATION, CHONDROPLASTY;  Surgeon: Kristian Avila MD;  Location: [facility];  Service: Orthopedics    MN LAPS ABD PRTM&OMENTUM DX W/WO SPEC BR/WA SPX N/A 12/12/2017    Procedure: LAPAROSCOPY DIAGNOSTIC  WITH LYSIS OF ADHESIONS;  Surgeon: Olaf John DO;  Location:  MAIN OR;  Service: General    UPPER GASTROINTESTINAL ENDOSCOPY  2019       Current Outpatient Medications:     Ascorbic Acid (vitamin C) 1000 MG tablet, Take 1,000 mg by mouth 2 (two) times a day, Disp: , Rfl:     B-D 3CC LUER-SHAMEKA SYR 25GX1\" 25G X 1\" 3 ML MISC, , Disp: , Rfl:     cetirizine (ZyrTEC) 10 mg tablet, Take 20 mg by mouth 2 (two) times a day, Disp: , Rfl:     cholecalciferol (VITAMIN D3) 1,000 units tablet, Take 5,000 Units by mouth daily, Disp: , Rfl:     cyanocobalamin (VITAMIN B-12) 100 mcg tablet, Take by mouth daily, Disp: , Rfl:     diphenhydrAMINE (BENADRYL) 50 mg capsule, Take 50 mg by mouth 4 (four) times a day as needed for itching Pt states takes 50mg daily, but may go up to 4 times daily prn, Disp: , Rfl:     famotidine (PEPCID) 40 MG tablet, TAKE ONE TABLET BY MOUTH ONCE DAILY, Disp: 90 tablet, Rfl: 1    folic acid (KP Folic Acid) 1 mg tablet, Take 1 tablet (1 mg total) by mouth daily, Disp: 30 tablet, Rfl: 3    hydrOXYzine HCL (ATARAX) 25 mg tablet, Take 25 mg by mouth daily, Disp: , Rfl:     hyoscyamine (ANASPAZ,LEVSIN) 0.125 MG tablet, Take 0.125 mg by mouth every 6 (six) hours as needed, Disp: , Rfl:     Lactobacillus (PROBIOTIC ACIDOPHILUS PO), Take by mouth, Disp: , Rfl:     Lactobacillus-Inulin ([facility]) CAPS, Take 200 mg by mouth daily, Disp: , Rfl:     leuprolide 1 mg/0.2 mL, Inject 0.2 mL (1 mg total) under the skin in the morning, Disp: 60 each, Rfl: 0    levalbuterol (XOPENEX HFA) 45 mcg/act inhaler, Inhale 1-2 puffs every 4 (four) hours as needed for shortness of breath, Disp: " "45 g, Rfl: 2    levothyroxine 100 mcg tablet, Take 75 mcg by mouth every other day, Disp: , Rfl:     liothyronine (CYTOMEL) 5 mcg tablet, Take 1 tablet (5 mcg total) by mouth daily Do not start before June 14, 2023., Disp: 30 tablet, Rfl: 0    magnesium gluconate (MAGONATE) 500 mg tablet, Take 500 mg by mouth 2 (two) times a day, Disp: , Rfl:     Melatonin 3 MG SUBL, 6 mg daily at bedtime, Disp: , Rfl:     methotrexate 50 MG/2ML injection, Inject 1 mL (25 mg total) under the skin once a week, Disp: 4 mL, Rfl: 10    mirtazapine (REMERON) 15 mg tablet, Take 1 tablet (15 mg total) by mouth daily at bedtime, Disp: 90 tablet, Rfl: 1    montelukast (SINGULAIR) 10 mg tablet, Take 1 tablet (10 mg total) by mouth daily at bedtime, Disp: , Rfl: 0    NEEDLE, DISP, 27 G (B-D DISP NEEDLE 54VV4-6/4\") 27G X 1-1/4\" MISC, Use once a week, Disp: 100 each, Rfl: 0    Potassium 99 MG TABS, Take 99 mg by mouth 2 (two) times a day, Disp: , Rfl:     predniSONE 20 mg tablet, Take 40mg per day for 1 week, then 30mg per day for 1 week, then 20mg per day for 1 week and then 10mg per day for 1 week., Disp: 100 tablet, Rfl: 0    Riboflavin (Vitamin B-2) 100 MG TABS, Take 100 mg by mouth 2 (two) times a day, Disp: , Rfl:     risankizumab-rzaa (Skyrizi) 360 MG/2.4ML SOCT, Take first on body injector (OBI) under skin on week 12 then every 8 weeks there after, Disp: 2.4 mL, Rfl: 5    TechLite Lancets MISC, , Disp: , Rfl:     butalbital-acetaminophen-caffeine (FIORICET,ESGIC) -40 mg per tablet, Take 1 tablet by mouth every 8 (eight) hours as needed for migraine (Patient not taking: Reported on 3/20/2024), Disp: 20 tablet, Rfl: 1    lisinopril (ZESTRIL) 10 mg tablet, , Disp: , Rfl:     LORazepam (ATIVAN) 1 mg tablet, Take 1 tablet (1 mg total) by mouth every 8 (eight) hours as needed for anxiety (abdominal pain) for up to 10 days Caution may cause sedation.   Do not take at the same time as dilaudid (Patient not taking: Reported on 3/20/2024), " Disp: 10 tablet, Rfl: 0    oxybutynin (DITROPAN) 5 mg tablet, Take 1 tablet (5 mg total) by mouth 2 (two) times a day as needed (bladder pain) (Patient not taking: Reported on 3/20/2024), Disp: 10 tablet, Rfl: 0    Riboflavin (VITAMIN B-2 PO), Take by mouth (Patient not taking: Reported on 3/20/2024), Disp: , Rfl:   No current facility-administered medications for this visit.    Facility-Administered Medications Ordered in Other Visits:     EPINEPHrine PF (ADRENALIN) 1 mg/mL injection 0.3 mg, 0.3 mg, Intramuscular, Q15 Min PRN, Olaf Barrios MD  Allergies   Allergen Reactions    Aimovig [Erenumab-Aooe] Anaphylaxis    Amitriptyline Anaphylaxis     According to the patient she had nphylaxis    Aspergillus Allergy Skin Test Other (See Comments), Hives and Swelling    Azathioprine Other (See Comments) and Anaphylaxis     pancreatitis  According to patient she needed Epipen    Buspar [Buspirone] Hives and Shortness Of Breath    Citric Acid-Polysorbate 80 Abdominal Pain, Anaphylaxis, Anxiety, Bradycardia, Diarrhea, Fatigue, GI Intolerance, Headache, Hives, Hyperactivity, Itching, Lip Swelling, Nausea Only, Palpitations, Rash, Shortness Of Breath, Tachycardia, Throat Swelling, Tongue Swelling and Vomiting    Corn Dextrin Anaphylaxis     Any corn based products    Erenumab Anaphylaxis     patient sent portal message stating she had anaphylaxis  patient sent portal message stating she had anaphylaxis      Gadolinium Abdominal Pain, Anaphylaxis, Anxiety, Confusion, Delirium, Dizziness, Eye Swelling, Fatigue, GI Intolerance, Headache, Hives, Irritability, Itching, Lip Swelling, Nausea Only, Palpitations, Photosensitivity, Rash, Seizures, Shortness Of Breath, Swelling, Syncope, Throat Swelling, Tongue Swelling, Tremor, Visual Disturbance and Vomiting    Gelatin - Food Allergy Anaphylaxis    Heparin Hives     Painful welts     Lavender Oil Anaphylaxis     Other reaction(s): anaphalxis    Malto Dextrin [Dextrin] Anaphylaxis     "Penicillium Notatum Shortness Of Breath and Swelling    Red Dye - Food Allergy Anaphylaxis and Other (See Comments)     intolerance    Sumatriptan Anaphylaxis     Bradycardia, sob, back pressure, head pain,throat tightness  According to the patient she had anphylaxis    Ustekinumab Anaphylaxis    Lidocaine Hives, Itching and Rash    Contrast [Iodinated Contrast Media] Other (See Comments)     Pt states needs to be premedicated prior to injection    Corn Oil - Food Allergy - Food Allergy Hives    Humira [Adalimumab] Other (See Comments)     \"I develop drug induced lupus\"    Ibuprofen Hives and Throat Swelling    Polyethylene Glycol Diarrhea and Vomiting    Remicade [Infliximab] Other (See Comments)     \"I develop drug induced lupus\"    Sulfa Antibiotics Hives and Other (See Comments)    Sulfate Hives    Sulfites - Food Allergy Hives    Sweet Corn Extract Skin Test - Food Allergy Hives and Itching    Chlorhexidine Itching    Freederm Adhesive Remover [New Skin] Rash    Histamine Hives, Rash and Other (See Comments)     Intolerance      Wound Dressings Rash       Labs:     Chemistry        Component Value Date/Time     01/16/2015 1213    K 4.1 03/06/2024 0548    K 4.9 04/09/2022 0125     03/06/2024 0548     04/09/2022 0125    CO2 28 03/06/2024 0548    CO2 29 04/09/2022 0125    BUN 14 03/06/2024 0548    BUN 8 04/09/2022 0125    CREATININE 0.66 03/06/2024 0548    CREATININE 0.72 04/09/2022 0125        Component Value Date/Time    CALCIUM 8.6 03/06/2024 0548    CALCIUM 9.1 04/09/2022 0125    ALKPHOS 80 03/04/2024 0546    ALKPHOS 60 04/09/2022 0125    AST 20 03/04/2024 0546    AST 41 (H) 04/09/2022 0125    ALT 47 03/04/2024 0546    ALT 23 04/09/2022 0125    BILITOT 0.7 01/16/2015 1213            No results found for: \"CHOL\"  Lab Results   Component Value Date    HDL 63 01/29/2024    HDL 59 11/16/2023    HDL 59 08/08/2023     Lab Results   Component Value Date    LDLCALC 69 01/29/2024    LDLCALC 79 " "11/16/2023    LDLCALC 87 08/08/2023     Lab Results   Component Value Date    TRIG 308 (H) 01/29/2024    TRIG 217 (H) 11/16/2023    TRIG 148 08/08/2023     No results found for: \"CHOLHDL\"    Imaging: No results found.    ECG:  Normal sinus rhythm unremarkable tracing      ROS    Vitals:    03/20/24 1512   BP: 122/78   Pulse: 94     Vitals:    03/20/24 1512   Weight: 72.1 kg (159 lb)     Height: 5' 5\" (165.1 cm)   Body mass index is 26.46 kg/m².    Physical Exam:  Vital signs reviewed  General:  Alert and cooperative, appears stated age, no acute distress  HEENT:  PERRLA, EOMI, no scleral icterus, no conjunctival pallor  Neck:  No lymphadenopathy, no thyromegaly, no carotid bruits, no elevated JVP  Heart:  Regular rate and rhythm, normal S1/S2, no S3/S4, no murmur, rubs or gallops.  PMI nondisplaced  Lungs:  Clear to auscultation bilaterally, no wheezes rales or rhonchi  Abdomen:  Soft, non-tender, positive bowel sounds, no rebound or guarding,   no organomegaly   Extremities:  Normal range of motion.  No clubbing, cyanosis or edema   Vascular:  2+ pedal pulses stable  Skin:  No rashes or lesions on exposed skin  Neurologic:  Cranial nerves II-XII grossly intact without focal deficits  Psych:  Normal mood and affect        "

## 2024-03-21 NOTE — ASSESSMENT & PLAN NOTE
Patient hospitalized for over a month  Since being discharged, complains of generalized weakness  Feels as though she lost muscle mass during hospitalization from laying in bed all day  Plan;  Referral to physical therapy for strengthening exercises

## 2024-03-21 NOTE — ASSESSMENT & PLAN NOTE
Longtime history of chronic right upper quadrant pain  Seen by GI, general surgery, recently hospitalized for greater than a month  Patient complains of pain after eating  5 GI surgeries in 5 years  Sx: Hysterectomy, gallbladder removed, appendectomy     Currently, no abdominal pain for 2 days, patient recently started taking Hyoscyamine 0.125mg, Ibgard Peppermint oil, Florastor    Patient was scheduled to complete Scintigraphy for gastric motility, but will hold off while her pain is absent    Plan;  Continue Hyoscyamine, peppermint oil, Florastor.  Continue following with GI

## 2024-03-21 NOTE — PROGRESS NOTES
Patient tolerated infusion without incident. Denies need for AVS, aware of next scheduled appt on 3/26 @ 1pm.

## 2024-03-21 NOTE — PROGRESS NOTES
Assessment/Plan:    Chronic RUQ pain  Longtime history of chronic right upper quadrant pain  Seen by GI, general surgery, recently hospitalized for greater than a month  Patient complains of pain after eating  5 GI surgeries in 5 years  Sx: Hysterectomy, gallbladder removed, appendectomy     Currently, no abdominal pain for 2 days, patient recently started taking Hyoscyamine 0.125mg, Ibgard Peppermint oil, Florastor    Patient was scheduled to complete Scintigraphy for gastric motility, but will hold off while her pain is absent    Plan;  Continue Hyoscyamine, peppermint oil, Florastor.  Continue following with GI    Urticaria  Idiopathic urticaria  Well-controlled  Continue using Elocon 0.1% cream    Physical deconditioning  Patient hospitalized for over a month  Since being discharged, complains of generalized weakness  Feels as though she lost muscle mass during hospitalization from laying in bed all day  Plan;  Referral to physical therapy for strengthening exercises     Diagnoses and all orders for this visit:    Chronic RUQ pain    Physical deconditioning  -     Ambulatory Referral to Physical Therapy; Future    Urticaria  -     mometasone (ELOCON) 0.1 % cream; Apply topically daily          Subjective:      Patient ID: Celine Roa is a 49 y.o. female.    Patient is a shante 49-year-old female.  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  Patient has chronic right upper quadrant pain, has been ongoing for many year, she has been seen by GI and underwent exploratory surgery, gallbladder removal, appendix removal, hysterectomy.  She was recently started on 2 medications that have greatly improved her symptoms, for the first time she has not woken up with stomach pain.  This made patient tearful and she is pleased to have some relief.  She was hospitalized for 1 month due to abdominal pain, during this time she laid in bed a lot, feels like she is much weaker and  "lost muscle.  She would like to go to physical therapy for strengthening.        The following portions of the patient's history were reviewed and updated as appropriate: allergies, current medications, past family history, past medical history, past social history, past surgical history, and problem list.    Review of Systems   Constitutional:  Positive for fatigue. Negative for chills and fever.   HENT:  Negative for ear pain and sore throat.    Eyes:  Negative for pain and visual disturbance.   Respiratory:  Negative for cough and shortness of breath.    Cardiovascular:  Negative for chest pain and palpitations.   Gastrointestinal:  Negative for abdominal distention, abdominal pain, blood in stool, constipation, diarrhea and vomiting.   Genitourinary:  Negative for dysuria and hematuria.   Musculoskeletal:  Negative for arthralgias and back pain.   Skin:  Negative for color change and rash.   Neurological:  Positive for weakness (General). Negative for seizures and syncope.   All other systems reviewed and are negative.        Objective:      /98 (BP Location: Left arm, Patient Position: Sitting, Cuff Size: Standard)   Pulse 101   Temp 98.9 °F (37.2 °C)   Resp 18   Ht 5' 5.25\" (1.657 m)   Wt 70.8 kg (156 lb) Comment: patient refused  SpO2 97%   BMI 25.76 kg/m²          Physical Exam  Vitals and nursing note reviewed.   Constitutional:       Appearance: Normal appearance.   HENT:      Right Ear: External ear normal.      Left Ear: External ear normal.      Nose: No rhinorrhea.      Mouth/Throat:      Mouth: Mucous membranes are moist.   Eyes:      General:         Right eye: No discharge.         Left eye: No discharge.      Conjunctiva/sclera: Conjunctivae normal.   Cardiovascular:      Rate and Rhythm: Normal rate and regular rhythm.      Pulses: Normal pulses.      Heart sounds: Normal heart sounds. No murmur heard.  Pulmonary:      Effort: Pulmonary effort is normal. No respiratory distress.      " Breath sounds: Normal breath sounds.   Abdominal:      General: There is no distension.      Tenderness: There is no abdominal tenderness. There is no guarding.   Musculoskeletal:      Cervical back: Normal range of motion.      Right lower leg: No edema.      Left lower leg: No edema.   Skin:     Coloration: Skin is not jaundiced.      Findings: No erythema or rash.   Neurological:      Mental Status: She is alert. Mental status is at baseline.   Psychiatric:         Mood and Affect: Mood normal.         Behavior: Behavior normal.

## 2024-03-22 ENCOUNTER — TELEPHONE (OUTPATIENT)
Dept: PSYCHIATRY | Facility: CLINIC | Age: 49
End: 2024-03-22

## 2024-03-22 NOTE — TELEPHONE ENCOUNTER
Patient is calling regarding cancelling an appointment.    Date/Time: 3/28/24 at 4 pm    Reason: Conflicting Schedule     Patient was rescheduled: YES [] NO [x]  If yes, when was Patient reschedule for:     Patient requesting call back to reschedule: YES [] NO [x]

## 2024-03-26 ENCOUNTER — OFFICE VISIT (OUTPATIENT)
Dept: PSYCHIATRY | Facility: CLINIC | Age: 49
End: 2024-03-26
Payer: COMMERCIAL

## 2024-03-26 ENCOUNTER — HOSPITAL ENCOUNTER (OUTPATIENT)
Dept: INFUSION CENTER | Facility: HOSPITAL | Age: 49
Discharge: HOME | End: 2024-03-26
Attending: INTERNAL MEDICINE
Payer: COMMERCIAL

## 2024-03-26 DIAGNOSIS — D50.0 IRON DEFICIENCY ANEMIA DUE TO CHRONIC BLOOD LOSS: ICD-10-CM

## 2024-03-26 DIAGNOSIS — R11.10 VOMITING AND DIARRHEA: Primary | ICD-10-CM

## 2024-03-26 DIAGNOSIS — R19.7 VOMITING AND DIARRHEA: Primary | ICD-10-CM

## 2024-03-26 DIAGNOSIS — E44.1 MILD PROTEIN-CALORIE MALNUTRITION (HCC): ICD-10-CM

## 2024-03-26 DIAGNOSIS — F41.1 GENERALIZED ANXIETY DISORDER: Primary | ICD-10-CM

## 2024-03-26 DIAGNOSIS — F33.41 RECURRENT MAJOR DEPRESSIVE DISORDER, IN PARTIAL REMISSION (HCC): ICD-10-CM

## 2024-03-26 DIAGNOSIS — R63.0 ANOREXIA: ICD-10-CM

## 2024-03-26 DIAGNOSIS — E61.1 IRON DEFICIENCY: ICD-10-CM

## 2024-03-26 PROCEDURE — 99215 OFFICE O/P EST HI 40 MIN: CPT | Performed by: STUDENT IN AN ORGANIZED HEALTH CARE EDUCATION/TRAINING PROGRAM

## 2024-03-26 PROCEDURE — 96361 HYDRATE IV INFUSION ADD-ON: CPT

## 2024-03-26 PROCEDURE — 96360 HYDRATION IV INFUSION INIT: CPT

## 2024-03-26 RX ORDER — METHYLPREDNISOLONE SODIUM SUCCINATE 40 MG/ML
40 INJECTION, POWDER, LYOPHILIZED, FOR SOLUTION INTRAMUSCULAR; INTRAVENOUS ONCE
Status: CANCELLED
Start: 2024-03-29 | End: 2024-03-27

## 2024-03-26 RX ORDER — METHYLPREDNISOLONE SODIUM SUCCINATE 40 MG/ML
40 INJECTION, POWDER, LYOPHILIZED, FOR SOLUTION INTRAMUSCULAR; INTRAVENOUS ONCE
Status: DISCONTINUED | OUTPATIENT
Start: 2024-03-26 | End: 2024-03-29 | Stop reason: HOSPADM

## 2024-03-26 RX ADMIN — SODIUM CHLORIDE 1000 ML: 0.9 INJECTION, SOLUTION INTRAVENOUS at 14:30

## 2024-03-27 NOTE — PSYCH
"MEDICATION MANAGEMENT NOTE        [facility]      Name and Date of Birth:  Celine Roa 49 y.o. 1975 MRN: [id number]    Date of Visit: 03/26/24       Visit Time    Visit Start Time: 1100  Visit Stop Time: 1145  Total Visit Duration:  45 minutes    Reason for Visit: Follow-up visit regarding medication management     Chief Complaint: \"I felt like I had side effects from the mirtazapine.\"    SUBJECTIVE:    Celine Roa is a 49 y.o., female, possessing a past psychiatric history significant for anxiety, medically complicated by mast cell activation syndrome, who was personally seen and evaluated at the [facility] outpatient clinic for follow-up regarding medication management.  She is currently prescribed mirtazapine 15mg qHS.   During interview today, Celine states that she is feeling \"tired\".  States that she has had a long 2 months since she last saw me, as she has had multiple hospital stays for physical health issues, primarily with her gastrointestinal system.  States that she did have an ERCP, which ended up leading to an infection and sepsis.  She spends the next several minutes describing her frustrations with physicians who do not understand her corn allergy, as well as describing how frustrated she feels that \"doctors get upset with me because they do not understand what I am going through \".  She states that she is trying to get off of medication, indicating her clear plastic purse that she has brought filled with numerous different bottles of prescription medications and supplements.  She states that she did meet with her neurologist last week who is going to schedule her for pelvic floor exercises with PT, and she is still working with her gastroenterology, hoping that she does not have to get as many colonoscopies in the future for her Crohn's disease.  States that she feels upset sometimes that her  is frustrated with " her illness, that he does not understand.  She admits that she does not feel she is depressed, but gets overwhelmed with her physical health issues.  She is tearful describing this, stating that she does want to feel better and be able to function.  She states that they have thought about moving, [place]  She denies thoughts to herself or anyone else, denies any hallucinations.  She states that she would never harm herself because she had a friend who committed suicide when she was a child.  Celine admits that she has been having side effects from the mirtazapine, it has been causing restless legs.  She states that it has been interfering with her sleep, leading to her not even feeling like she is resting well.  She states that she would like to try discontinuing the medication, as she feels that her depression has dissipated.  She states that she does regret her overdose on Ativan last year, but does feel that her doctor should have explained more adequately that she was taking higher and higher doses.  Agrees to follow-up with me in 2 months, stating that she is not currently seeing a therapist but is on the wait list for therapy at this office.      Current Rating Scores:   None completed today.    Psychiatric Review Of Systems:    Appetite: no, no change  Adverse eating: no  Weight changes: no  Insomnia/sleeplessness: no  Fatigue/anergy: no  Anhedonia/lack of interest: no  Attention/concentration: no  Psychomotor agitation/retardation: no  Somatic symptoms: yes, nausea  Anxiety/panic attack: worrying  Kendal/hypomania: no  Hopelessness/helplessness/worthlessness: feeling better  Self-injurious behavior/high-risk behavior: no  Suicidal ideation: no  Homicidal ideation: no  Auditory hallucinations: no  Visual hallucinations: no  Other perceptual disturbances: no  Delusional thinking: no  Obsessive/compulsive symptoms: no    Review Of Systems:      Constitutional  feeling poorly, feeling tired, and as noted in HPI   ENT negative   Cardiovascular negative   Respiratory negative   Gastrointestinal abdominal discomfort, abdominal cramps, and as noted in HPI   Genitourinary negative   Musculoskeletal negative   Integumentary negative   Neurological negative   Endocrine negative   Other Symptoms none, all other systems are negative     History Review:   The following portions of the patient's history were reviewed and updated as appropriate: allergies, current medications, past family history, past medical history, past social history, past surgical history and problem list..         OBJECTIVE:     Vital signs in last 24 hours:    There were no vitals filed for this visit.    Mental Status Evaluation:    Appearance age appropriate, casually dressed   Behavior cooperative, mildly anxious   Speech normal rate, normal volume, normal pitch   Mood improved, euthymic   Affect normal range and intensity, appropriate   Thought Processes organized, goal directed   Associations intact associations   Thought Content no overt delusions   Perceptual Disturbances: no auditory hallucinations, no visual hallucinations   Abnormal Thoughts  Risk Potential Suicidal ideation - None, occasional passive death wish, but denies any active suicidal ideation, intent or plan at present, contracts for safety, would not harm self, would got to Emergency Room if feeling unsafe  Homicidal ideation - None  Potential for aggression - No   Orientation oriented to person, place, time/date, and situation   Memory recent and remote memory grossly intact   Consciousness alert and awake   Attention Span Concentration Span attention span and concentration are age appropriate   Intellect appears to be of average intelligence   Insight intact   Judgement intact   Muscle Strength and  Gait normal muscle strength and normal muscle tone, normal gait and normal balance   Motor activity no abnormal movements   Fund of  Knowledge adequate knowledge of current events  adequate fund of knowledge regarding past history  adequate fund of knowledge regarding vocabulary    Pain none   Pain Scale 0       Laboratory Results: I have personally reviewed all pertinent laboratory/tests results    Recent Labs (last 2 months):   No results displayed because visit has over 200 results.      No results displayed because visit has over 200 results.      Hospital Outpatient Visit on 01/31/2024   Component Date Value    Triscuspid Valve Regurgi* 01/31/2024 20.0     RAA A4C 01/31/2024 13.9     LA Volume Index (BP) 01/31/2024 33.9     PV Peak D Jasmeet 01/31/2024 0.18     PV Peak S Jasmeet 01/31/2024 0.16     MV Peak A Jasmeet 01/31/2024 0.74     MV stenosis pressure 1/2* 01/31/2024 64     MV Peak E Jasmeet 01/31/2024 61     E wave deceleration time 01/31/2024 222     E/A ratio 01/31/2024 0.82     MV valve area p 1/2 meth* 01/31/2024 3.44     TR Peak Jasmeet 01/31/2024 2.2     RVID d 01/31/2024 2.6     Tricuspid valve peak reg* 01/31/2024 2.23     Tricuspid valve peak E w* 01/31/2024 0.16     Left ventricular stroke * 01/31/2024 72.00     IVSd 01/31/2024 0.80     Tricuspid annular plane * 01/31/2024 2.20     Ao root 01/31/2024 3.30     LVPWd 01/31/2024 0.80     LA size 01/31/2024 3.5     Asc Ao 01/31/2024 3.7     LA volume (BP) 01/31/2024 61     FS 01/31/2024 35     LVIDS 01/31/2024 3.20     IVS 01/31/2024 0.8     LVIDd 01/31/2024 4.90     LA length (A2C) 01/31/2024 5.20     LEFT VENTRICLE SYSTOLIC * 01/31/2024 40     LV DIASTOLIC VOLUME (MOD* 01/31/2024 112     Left Atrium Area-systoli* 01/31/2024 18.6     Left Atrium Area-systoli* 01/31/2024 19.7     MV E' Tissue Velocity La* 01/31/2024 13     MV E' Tissue Velocity Se* 01/31/2024 9     LVSV, 2D 01/31/2024 72     BSA 01/31/2024 1.8     Pulm vein S/D ratio 01/31/2024 0.89     LV EF 01/31/2024 60    Appointment on 01/30/2024   Component Date Value    Creatinine, Ur 01/30/2024 155.7     Albumin,U,Random 01/30/2024 8.2      Albumin Creat Ratio 01/30/2024 5    Appointment on 01/29/2024   Component Date Value    CRP 01/29/2024 3.4 (H)     WBC 01/29/2024 9.73     RBC 01/29/2024 4.91     Hemoglobin 01/29/2024 15.7 (H)     Hematocrit 01/29/2024 48.0 (H)     MCV 01/29/2024 98     MCH 01/29/2024 32.0     MCHC 01/29/2024 32.7     RDW 01/29/2024 14.4     MPV 01/29/2024 8.9     Platelets 01/29/2024 410 (H)     nRBC 01/29/2024 0     Neutrophils Relative 01/29/2024 64     Immature Grans % 01/29/2024 1     Lymphocytes Relative 01/29/2024 25     Monocytes Relative 01/29/2024 6     Eosinophils Relative 01/29/2024 3     Basophils Relative 01/29/2024 1     Neutrophils Absolute 01/29/2024 6.17     Absolute Immature Grans 01/29/2024 0.05     Absolute Lymphocytes 01/29/2024 2.46     Absolute Monocytes 01/29/2024 0.61     Eosinophils Absolute 01/29/2024 0.32     Basophils Absolute 01/29/2024 0.12 (H)     Sodium 01/29/2024 134 (L)     Potassium 01/29/2024 4.6     Chloride 01/29/2024 99     CO2 01/29/2024 26     ANION GAP 01/29/2024 9     BUN 01/29/2024 12     Creatinine 01/29/2024 0.70     Glucose, Fasting 01/29/2024 90     Calcium 01/29/2024 9.0     AST 01/29/2024 30     ALT 01/29/2024 53 (H)     Alkaline Phosphatase 01/29/2024 60     Total Protein 01/29/2024 7.1     Albumin 01/29/2024 4.2     Total Bilirubin 01/29/2024 0.62     eGFR 01/29/2024 101     Hemoglobin A1C 01/29/2024 5.3     EAG 01/29/2024 105     TSH 3RD GENERATON 01/29/2024 0.215 (L)     Cholesterol 01/29/2024 194     Triglycerides 01/29/2024 308 (H)     HDL, Direct 01/29/2024 63     LDL Calculated 01/29/2024 69     Non-HDL-Chol (CHOL-HDL) 01/29/2024 131     T3, Free 01/29/2024 3.39     Vitamin B-12 01/29/2024 824     Folate 01/29/2024 13.7     Vitamin A 01/29/2024 64.2 (H)     Iron Saturation 01/29/2024 25     TIBC 01/29/2024 310     Iron 01/29/2024 76     UIBC 01/29/2024 234     Ferritin 01/29/2024 440 (H)     Free T4 01/29/2024 0.90        Suicide/Homicide Risk Assessment:    The following  interventions are recommended: contracts for safety at present - agrees to go to ED if feeling unsafe, contracts for safety at present - agrees to call Crisis Intervention Service if feeling unsafe. Although patient's acute lethality risk is low, long-term/chronic lethality risk is mildly elevated in the presence of depression. At the current moment, Celine is future-oriented, forward-thinking, and demonstrates ability to act in a self-preserving manner as evidenced by volitionally presenting to the clinic today, seeking treatment. To mitigate future risk, patient should adhere to the recommendations below, avoid alcohol/illicit substance use, utilize community-based resources and familiar support and prioritize mental health treatment. Presently, patient denies active suicidal/homicidal ideation in addition to thoughts of self-injury; contracts for safety.  At conclusion of evaluation, patient is amenable to the recommendations below. Patient is amenable to calling/contacting the outpatient office including this writer if any acute adverse effects of their medication regimen arise in addition to any comments or concerns pertaining to their psychiatric management.  Patient is amenable to calling/contacting crisis and/or attending to the nearest emergency department if their clinical condition deteriorates to assure their safety and stability, stating that they are able to appropriately confide in their  regarding their psychiatric state.    Assessment/Plan:     Diagnoses and all orders for this visit:    Generalized anxiety disorder    Recurrent major depressive disorder, in partial remission (HCC)       Celine is a 49-year-old female who has a history of depression and anxiety, mostly related to her medical issues.  She has been recently been hospitalized following an overdose on benzodiazepines.  She participated in the partial hospitalization program has noted improvement since then.  She does not appear to be  in acute danger to herself or others, wants to work on her issues with her therapist.      Treatment Recommendations/Precautions:    Will d/c mirtazapine due to side effects.  Patient's depression is in remission at this time.   Could also consider wellbutrin as well.   Medication management every 4 weeks  Continue psychotherapy with own therapist  Aware of 24 hour and weekend coverage for urgent situations accessed by calling [facility] main practice number  Patient advised to call 911 if feeling suicidal or homicidal before acting out on their thoughts and they expressed understanding.  Medications Risks/Benefits      Risks, Benefits And Possible Side Effects Of Medications:    Risks, benefits, and possible side effects of medications explained to Celine and she verbalizes understanding and agreement for treatment. including: Risks and potential side effects of medication discussed with patient including serotonin syndrome, SIADH, worsening depression, suicidality, induction of concetta, GI upset, headaches, activation, sexual side effects, sedation, potential drug interactions, and others. Patient expressed understanding.   .     Controlled Medication Discussion:     Not applicable    Psychotherapy Provided:   Individual psychotherapy provided: No.        Treatment Plan:    Completed and signed during the session: Not applicable - Treatment Plan not due at this session    This note was not shared with the patient due to this is a psychotherapy note      Diogenes Lewis, DO 03/26/24

## 2024-03-27 NOTE — PROGRESS NOTES
Celine Roa  tolerated treatment well with no complications.      Celine Roa is aware of future appt on 3/29/24 at 1330.     AVS printed and given to Celine Roa:   No (Declined by Celine Roa) x

## 2024-03-28 ENCOUNTER — DOCUMENTATION (OUTPATIENT)
Dept: BEHAVIORAL/MENTAL HEALTH CLINIC | Facility: CLINIC | Age: 49
End: 2024-03-28

## 2024-03-28 ENCOUNTER — TELEPHONE (OUTPATIENT)
Dept: PSYCHIATRY | Facility: CLINIC | Age: 49
End: 2024-03-28

## 2024-03-28 DIAGNOSIS — F06.31 DEPRESSION DUE TO PHYSICAL ILLNESS: ICD-10-CM

## 2024-03-28 DIAGNOSIS — F41.1 GENERALIZED ANXIETY DISORDER: Primary | Chronic | ICD-10-CM

## 2024-03-28 NOTE — TELEPHONE ENCOUNTER
Writer contacted patient regarding continuation of care until MELISSA appointment can be scheduled. Patient disclosed her frustration with lack of accessibility to provider and lack of means to contact or reach out to provider. Patient stated provider is not listed in her my chart which she contacted the help line for assistance. Patient stated the office neglected to respond to messages she sent and was very upset about her no show letter in Feb. Writer apologized for the negligence in her care and stated that provider would be notified and writer sent a test my chart message to see if communication was working. Writer offered her to continue care with current provider, but patient declined stating that she prefers in office apt at this point. Patient was satisfied with caller and writer encouraged her to call her line if needing anything in the near future.

## 2024-03-29 ENCOUNTER — HOSPITAL ENCOUNTER (OUTPATIENT)
Dept: INFUSION CENTER | Facility: HOSPITAL | Age: 49
End: 2024-03-29
Attending: INTERNAL MEDICINE
Payer: COMMERCIAL

## 2024-03-29 VITALS
SYSTOLIC BLOOD PRESSURE: 127 MMHG | RESPIRATION RATE: 18 BRPM | OXYGEN SATURATION: 96 % | TEMPERATURE: 98.5 F | HEART RATE: 100 BPM | DIASTOLIC BLOOD PRESSURE: 83 MMHG

## 2024-03-29 DIAGNOSIS — E61.1 IRON DEFICIENCY: ICD-10-CM

## 2024-03-29 DIAGNOSIS — R11.10 VOMITING AND DIARRHEA: Primary | ICD-10-CM

## 2024-03-29 DIAGNOSIS — D50.0 IRON DEFICIENCY ANEMIA DUE TO CHRONIC BLOOD LOSS: ICD-10-CM

## 2024-03-29 DIAGNOSIS — E44.1 MILD PROTEIN-CALORIE MALNUTRITION (HCC): ICD-10-CM

## 2024-03-29 DIAGNOSIS — R19.7 VOMITING AND DIARRHEA: Primary | ICD-10-CM

## 2024-03-29 DIAGNOSIS — R63.0 ANOREXIA: ICD-10-CM

## 2024-03-29 PROCEDURE — 96361 HYDRATE IV INFUSION ADD-ON: CPT

## 2024-03-29 PROCEDURE — 96360 HYDRATION IV INFUSION INIT: CPT

## 2024-03-29 RX ORDER — METHYLPREDNISOLONE SODIUM SUCCINATE 40 MG/ML
40 INJECTION, POWDER, LYOPHILIZED, FOR SOLUTION INTRAMUSCULAR; INTRAVENOUS ONCE
Start: 2024-04-02 | End: 2024-03-30

## 2024-03-29 RX ADMIN — SODIUM CHLORIDE 1000 ML: 0.9 INJECTION, SOLUTION INTRAVENOUS at 14:19

## 2024-03-29 NOTE — PROGRESS NOTES
Celine Roa tolerated treatment well with no complications.      Celine Roa is aware of future appt on 4/2/24 at 1400.     AVS declined.

## 2024-04-02 ENCOUNTER — HOSPITAL ENCOUNTER (OUTPATIENT)
Dept: INFUSION CENTER | Facility: HOSPITAL | Age: 49
Discharge: HOME | End: 2024-04-02
Attending: INTERNAL MEDICINE
Payer: COMMERCIAL

## 2024-04-02 ENCOUNTER — EVALUATION (OUTPATIENT)
Dept: PHYSICAL THERAPY | Facility: CLINIC | Age: 49
End: 2024-04-02
Payer: COMMERCIAL

## 2024-04-02 VITALS
DIASTOLIC BLOOD PRESSURE: 95 MMHG | HEART RATE: 107 BPM | SYSTOLIC BLOOD PRESSURE: 148 MMHG | RESPIRATION RATE: 18 BRPM | TEMPERATURE: 97.2 F

## 2024-04-02 DIAGNOSIS — E44.1 MILD PROTEIN-CALORIE MALNUTRITION (HCC): ICD-10-CM

## 2024-04-02 DIAGNOSIS — R19.7 VOMITING AND DIARRHEA: Primary | ICD-10-CM

## 2024-04-02 DIAGNOSIS — R63.0 ANOREXIA: ICD-10-CM

## 2024-04-02 DIAGNOSIS — D50.0 IRON DEFICIENCY ANEMIA DUE TO CHRONIC BLOOD LOSS: ICD-10-CM

## 2024-04-02 DIAGNOSIS — R53.81 PHYSICAL DECONDITIONING: Primary | ICD-10-CM

## 2024-04-02 DIAGNOSIS — E61.1 IRON DEFICIENCY: ICD-10-CM

## 2024-04-02 DIAGNOSIS — R11.10 VOMITING AND DIARRHEA: Primary | ICD-10-CM

## 2024-04-02 DIAGNOSIS — M62.81 MUSCLE WEAKNESS (GENERALIZED): ICD-10-CM

## 2024-04-02 PROCEDURE — 96361 HYDRATE IV INFUSION ADD-ON: CPT

## 2024-04-02 PROCEDURE — 97535 SELF CARE MNGMENT TRAINING: CPT | Performed by: PHYSICAL THERAPIST

## 2024-04-02 PROCEDURE — 97530 THERAPEUTIC ACTIVITIES: CPT | Performed by: PHYSICAL THERAPIST

## 2024-04-02 PROCEDURE — 97161 PT EVAL LOW COMPLEX 20 MIN: CPT | Performed by: PHYSICAL THERAPIST

## 2024-04-02 PROCEDURE — 96360 HYDRATION IV INFUSION INIT: CPT

## 2024-04-02 RX ORDER — METHYLPREDNISOLONE SODIUM SUCCINATE 40 MG/ML
40 INJECTION, POWDER, LYOPHILIZED, FOR SOLUTION INTRAMUSCULAR; INTRAVENOUS ONCE
Status: CANCELLED
Start: 2024-04-02 | End: 2024-04-03

## 2024-04-02 RX ADMIN — SODIUM CHLORIDE 1000 ML: 0.9 INJECTION, SOLUTION INTRAVENOUS at 14:09

## 2024-04-02 NOTE — PROGRESS NOTES
Pt tolerated NSS 1L hydration well without issues. PIV removed without incident.     Celine Roa is aware of future appt on 4/5/24 at 1PM.     AVS declined by Celine Roa.    Pt discharged off unit in stable condition with all personal belongings.

## 2024-04-02 NOTE — PLAN OF CARE
Problem: Potential for Falls  Goal: Patient will remain free of falls  Description: INTERVENTIONS:  - Educate patient/family on patient safety including physical limitations  - Instruct patient to call for assistance with activity   - Consult OT/PT to assist with strengthening/mobility   - Keep Call bell within reach  - Keep bed low and locked with side rails adjusted as appropriate  - Keep care items and personal belongings within reach  - Initiate and maintain comfort rounds  - Make Fall Risk Sign visible to staff  - Consider moving patient to room near nurses station  Outcome: Progressing     Problem: INFECTION - ADULT  Goal: Absence or prevention of progression during hospitalization  Description: INTERVENTIONS:  - Assess and monitor for signs and symptoms of infection  - Monitor lab/diagnostic results  - Monitor all insertion sites, i.e. indwelling lines, tubes, and drains  - Monitor endotracheal if appropriate and nasal secretions for changes in amount and color  - [place] appropriate cooling/warming therapies per order  - Administer medications as ordered  - Instruct and encourage patient and family to use good hand hygiene technique  - Identify and instruct in appropriate isolation precautions for identified infection/condition  Outcome: Progressing     Problem: Knowledge Deficit  Goal: Patient/family/caregiver demonstrates understanding of disease process, treatment plan, medications, and discharge instructions  Description: Complete learning assessment and assess knowledge base.  Interventions:  - Provide teaching at level of understanding  - Provide teaching via preferred learning methods  Outcome: Progressing

## 2024-04-02 NOTE — PROGRESS NOTES
PT Evaluation     Today's date: 2024  Patient name: Celine Roa  : 1975  MRN: [id number]  Referring provider: Maig Hall MD  Dx:   Encounter Diagnosis     ICD-10-CM    1. Physical deconditioning  R53.81 Ambulatory Referral to Physical Therapy      2. Muscle weakness (generalized)  M62.81                      Assessment  Understanding of Dx/Px/POC: good   Prognosis: good    Goals  STGs: To be complete within 4 weeks  - Decrease/centralize pain to < 2/10 at worst  - Increase lumbar extension ROM to WNL  - Increase bilateral hip flexor flexibility to WNL  - Increase core stability and posterior lateral LE chain strength to > 4+/5  - Improve postural awareness capacity to > 60min before deficit     LTGs: To be complete within 6 weeks  - Able to walk for any extended amount of time without pain or limitation for increased safety and functional capacity with ADLs and home-related duty  - Able to repetitively squat without pain or limitation for increased safety and functional capacity with ADLs and and home-related duty  - Able to complete all lifting/carrying activity without pain or limitation for increased safety and functional capacity with ADLs and whome-related duty  - Able to complete transfers repetitively without pain or limitation for increased safety and functional capacity with ADLs and home-related duty    Plan  Planned therapy interventions: manual therapy, neuromuscular re-education, patient education, postural training, self care, therapeutic activities, therapeutic exercise, home exercise program and gait training  Frequency: 2x week  Duration in weeks: 6       Pt is a very pleasant 49 y.o. female with chronic LBP who presents with functional deficits including decreased capacity with walking, squatting, lifting/carrying, and transfers. Upon completion of today's initial evaluation, Celine's sx remain consistent with muscular weakness and deconditioning from being hospital bedridden  while going through sepsis for > 4 weeks. Pt will benefit from skilled physical therapy to address current deficits.         Subjective Evaluation    Patient Goals  Patient goals for therapy: increased strength, decreased pain and return to sport/leisure activities    Pain  Current pain ratin  At best pain ratin  At worst pain ratin  Location: Lumbar         Pt reports she was in the hospital, bedridden for > 4 weeks due to sepsis from her chron's disease. Reports that has been amongst several other flare ups of chronic comorbidities, including multiple sclerosis, she has been dealing with over the past year and her body has become very weak and interfering with her overall safety and functional capacity with ADLs and home-related duty.        Objective Pain level ranges 2-8/10  AROM: WFL t/o bilateral UE and LE  Strength: Core stability and bilateral LE strength 3+/5; Postural strength 4/5l; Bilateral UE 4/5 t/o all planes  Postural Awareness: Poor (rounded shoulders, forward head, flexed trunk, ant pelvic tilt)  Gait: Shortened step length, wider MIGUEL  Hip flexor flexibility: (+) Olaf Test bilateral  FOTO: 55; Goal: 60  Unable walk without pain and limitation  Unable to squat without pain and limitation  Unable to complete lifting/carrying activity without pain and limitation  Unable to complete transfers without pain and limitation             Precautions: None at this time    Daily Treatment Diary    HEP: Handout provided and discussed      Manuals 24       ART        PROM/Stretch        IASTM        STM/Triggerpoint        JM                Neuro Re-Ed        SL Balance  This session      Wall Push Ups  This session                                              Ther Ex        Hallway Lat Walks TB  This session      Step Ups  This session      Sit to stands  This session      Leg Press  This session      Lat Pull Downs  This session      Machine Rows  This session      Bridge with Add Sq  This  session      HR/TR  This session      Leg Ext Machine  This session      Leg Curl Machine  This session                              Ther Activity        Incline TM Walk x10' Start with this session              Gait Training                        Modalities        MHP        CP        US/Stim

## 2024-04-04 ENCOUNTER — OFFICE VISIT (OUTPATIENT)
Dept: PHYSICAL THERAPY | Facility: CLINIC | Age: 49
End: 2024-04-04
Payer: COMMERCIAL

## 2024-04-04 DIAGNOSIS — M62.81 MUSCLE WEAKNESS (GENERALIZED): ICD-10-CM

## 2024-04-04 DIAGNOSIS — R53.81 PHYSICAL DECONDITIONING: Primary | ICD-10-CM

## 2024-04-04 PROCEDURE — 97110 THERAPEUTIC EXERCISES: CPT

## 2024-04-04 PROCEDURE — 97112 NEUROMUSCULAR REEDUCATION: CPT

## 2024-04-04 NOTE — PROGRESS NOTES
"Daily Note     Today's date: 2024  Patient name: Celine Roa  : 1975  MRN: [id number]  Referring provider: Magi Hall MD  Dx:   Encounter Diagnosis     ICD-10-CM    1. Physical deconditioning  R53.81       2. Muscle weakness (generalized)  M62.81                      Subjective: Pt reports she is eager to cont to make gains with program. Eager to cont to gain overall strength.       Objective: See treatment diary below      Assessment: Tolerated treatment well. Patient exhibited good technique with therapeutic exercises. Pt kassy program today with mild fatigue. Pt will cont to benefit from program to improved overall conditioning.       Plan: Continue per plan of care.      Precautions: None at this time    Daily Treatment Diary    HEP: Handout provided and discussed      Manuals 24      ART        PROM/Stretch        IASTM        STM/Triggerpoint        JM                Neuro Re-Ed        SL Balance  Next session      Wall Push Ups  3/10                                              Ther Ex        Hallway Lat Walks TB  L2 2 laps 60'      Step Ups  8\" 2/10      Sit to stands  2/10      Leg Press  P2 2/10      Lat Pull Downs  P2 2/10      Machine Rows  P2 2/10      Bridge with Add Sq  2/10      HR/TR  3/10      Leg Ext Machine  P2 2/10      Leg Curl Machine  P5 2/10                              Ther Activity        Incline TM Walk x10' Pt walked 2 miles prior. 6'               Gait Training                        Modalities        MHP        CP        US/Stim                        "

## 2024-04-05 ENCOUNTER — HOSPITAL ENCOUNTER (OUTPATIENT)
Dept: INFUSION CENTER | Facility: HOSPITAL | Age: 49
End: 2024-04-05
Attending: INTERNAL MEDICINE
Payer: COMMERCIAL

## 2024-04-05 VITALS
HEART RATE: 97 BPM | SYSTOLIC BLOOD PRESSURE: 123 MMHG | DIASTOLIC BLOOD PRESSURE: 79 MMHG | RESPIRATION RATE: 17 BRPM | TEMPERATURE: 97.3 F

## 2024-04-05 DIAGNOSIS — E61.1 IRON DEFICIENCY: ICD-10-CM

## 2024-04-05 DIAGNOSIS — D50.0 IRON DEFICIENCY ANEMIA DUE TO CHRONIC BLOOD LOSS: ICD-10-CM

## 2024-04-05 DIAGNOSIS — R63.0 ANOREXIA: ICD-10-CM

## 2024-04-05 DIAGNOSIS — E44.1 MILD PROTEIN-CALORIE MALNUTRITION (HCC): ICD-10-CM

## 2024-04-05 DIAGNOSIS — R11.10 VOMITING AND DIARRHEA: Primary | ICD-10-CM

## 2024-04-05 DIAGNOSIS — R19.7 VOMITING AND DIARRHEA: Primary | ICD-10-CM

## 2024-04-05 RX ORDER — METHYLPREDNISOLONE SODIUM SUCCINATE 40 MG/ML
40 INJECTION, POWDER, LYOPHILIZED, FOR SOLUTION INTRAMUSCULAR; INTRAVENOUS ONCE
Status: CANCELLED
Start: 2024-04-09 | End: 2024-04-06

## 2024-04-05 RX ORDER — METHYLPREDNISOLONE SODIUM SUCCINATE 40 MG/ML
40 INJECTION, POWDER, LYOPHILIZED, FOR SOLUTION INTRAMUSCULAR; INTRAVENOUS ONCE
Status: COMPLETED | OUTPATIENT
Start: 2024-04-05 | End: 2024-04-05

## 2024-04-05 RX ADMIN — DIPHENHYDRAMINE HYDROCHLORIDE 50 MG: 50 INJECTION, SOLUTION INTRAMUSCULAR; INTRAVENOUS at 15:05

## 2024-04-05 RX ADMIN — SODIUM CHLORIDE 1000 ML: 0.9 INJECTION, SOLUTION INTRAVENOUS at 14:16

## 2024-04-05 RX ADMIN — FAMOTIDINE 40 MG: 10 INJECTION, SOLUTION INTRAVENOUS at 14:30

## 2024-04-05 RX ADMIN — METHYLPREDNISOLONE SODIUM SUCCINATE 40 MG: 40 INJECTION, POWDER, FOR SOLUTION INTRAMUSCULAR; INTRAVENOUS at 14:55

## 2024-04-05 NOTE — PROGRESS NOTES
Celine Roa  tolerated treatment well with no complications.      Celine Roa is aware of future appt on 4/9/24 at 1.     AVS printed and given to Celine Roa:   No (Declined by Celine Roa) x

## 2024-04-08 ENCOUNTER — TELEMEDICINE (OUTPATIENT)
Dept: GASTROENTEROLOGY | Facility: AMBULARY SURGERY CENTER | Age: 49
End: 2024-04-08
Payer: COMMERCIAL

## 2024-04-08 ENCOUNTER — OFFICE VISIT (OUTPATIENT)
Dept: PHYSICAL THERAPY | Facility: CLINIC | Age: 49
End: 2024-04-08
Payer: COMMERCIAL

## 2024-04-08 DIAGNOSIS — K50.119 CROHN'S DISEASE OF COLON WITH COMPLICATION (HCC): Primary | ICD-10-CM

## 2024-04-08 DIAGNOSIS — M62.81 MUSCLE WEAKNESS (GENERALIZED): ICD-10-CM

## 2024-04-08 DIAGNOSIS — R53.81 PHYSICAL DECONDITIONING: Primary | ICD-10-CM

## 2024-04-08 DIAGNOSIS — R19.7 DIARRHEA, UNSPECIFIED TYPE: ICD-10-CM

## 2024-04-08 DIAGNOSIS — D84.9 IMMUNOSUPPRESSED STATUS (HCC): ICD-10-CM

## 2024-04-08 PROCEDURE — 97110 THERAPEUTIC EXERCISES: CPT

## 2024-04-08 PROCEDURE — 97112 NEUROMUSCULAR REEDUCATION: CPT

## 2024-04-08 PROCEDURE — 99214 OFFICE O/P EST MOD 30 MIN: CPT | Performed by: INTERNAL MEDICINE

## 2024-04-08 NOTE — PROGRESS NOTES
Virtual Regular Visit    Verification of patient location:    Patient is located at Home in the following state in which I hold an active license [place]      Assessment/Plan:    47 yo woman with hereditary alpha tryptesemia, Hashimoto's, IBD (likely Crohn's), diagnosis of [facility] (from [facility] and [facility]), anti-tnf induced lupus, previously on Stelara and now on Skyrizi but currently off medication, presenting for follow-up. She is feeling better at the moment.   EGD and colonoscopy May 2023 with gastric erosion, colonic granular mucosa. Biopsies with chronic active colitis. September 2023 flex sig with some mild abnormal mucosa and relatively benign colon biopsies. ERCP 2/2024 with antral erosion, prior sphincterotomy, balloon sweeps with slight debris, no filling defects. Echo with EF 60% and normal diastolic function. Abdominal x-ray with nonobstructive bowel gas pattern. MRI/MRCP with stable chronic intrahepatic and extrahepatic biliary ductal dilation. CT with no acute findings in the abdomen or pelvis.     Vitamin b12 normal. Folate normal. Vitamin D low at 22.8. iron studies normal. Magnesium normal. CMP with elevated glucose and ALT but otherwise normal electrolytes, Cr and liver tests. CBC with normal WBC, Hgb, plts. MCV elevated to 100. Tryptase normal. No evidence of corn allergy. CRP 3.2.    Problem List Items Addressed This Visit       Crohn's colitis (HCC) - Primary    Relevant Orders    Calprotectin,Fecal    CBC and differential    Comprehensive metabolic panel    C-reactive protein     Other Visit Diagnoses       Immunosuppressed status (HCC)        Diarrhea, unspecified type        Relevant Orders    Calprotectin,Fecal    CBC and differential    Comprehensive metabolic panel    C-reactive protein          She wants to hold off on Skyrizi for now  Will ordered repeat blood work and a stool test  Future options include restarting skyrizi vs Rinvoq  Next quant gold and hepatitis panel 12/2024  Continue to  follow-up with [place] as needed  Continue pepcid         Reason for visit is No chief complaint on file.       Encounter provider Yehuda Levine MD    Provider located at [facility]  [address] [facility] [address]      Recent Visits  No visits were found meeting these conditions.  Showing recent visits within past 7 days and meeting all other requirements  Today's Visits  Date Type Provider Dept   04/08/24 Telemedicine Yehuda Levine MD [facility]   Showing today's visits and meeting all other requirements  Future Appointments  No visits were found meeting these conditions.  Showing future appointments within next 150 days and meeting all other requirements       The patient was identified by name and date of birth. Celine Roa was informed that this is a telemedicine visit and that the visit is being conducted through Telephone.  My office door was closed. No one else was in the room.  She acknowledged consent and understanding of privacy and security of the video platform. The patient has agreed to participate and understands they can discontinue the visit at any time.    Patient is aware this is a billable service.     It was my intent to perform this visit via video technology but the patient was not able to do a video connection so the visit was completed via audio telephone only.      Subjective  Celine Roa is a 49 y.o. female with Crohn's.     She feels better the past 2 weeks.   She started PT to regain muscle tone.   She had a busy week when she went to [place]. Dr. Perez started her on hyoscyamine. It seems to be helping (after 2-3 days). She takes 1 a day at night. She also started IBGard - she notices a different. She also takes famotidine at night. She went back to benadryl instead of hydroxyzine. Her other physicians were concerned with her amount of medications.   Daily BM in the morning, no  urgency. She is able to make it to a bathroom.   Current medications: thyroid medication, vitamin B2, famotidine, melatonin, Singulair, benadryl and hyoscyamine, IBGard, probiotics.   She was due for Skyrizi last week. She was feeling well and was not in pain.   Formed stool      Past Medical History:   Diagnosis Date    Anemia 2007    Anxiety     Asthma     Bile duct stricture 2014    Sphincter of oddi dysfunction    Chronic kidney disease     Closed bimalleolar fracture 02/22/2024    Colon polyp 1998    Crohn's colitis (HCC)     Cyst of ovary 02/22/2024    Deep vein thrombosis (HCC)     Disease of thyroid gland     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 pt denies, documented in cardio note    Pain of right thigh 02/13/2023    Pancreatitis     sphinger of     Psychiatric disorder     RA (rheumatoid arthritis) (HCC)     Seizures (HCC)     Sepsis without acute organ dysfunction (HCC)     SIRS (systemic inflammatory response syndrome) (HCC)     Thrombocytosis 04/05/2022    Ulcerative colitis (HCC)     Visual impairment        Past Surgical History:   Procedure Laterality Date    ABDOMINAL SURGERY  2017    APPENDECTOMY      CHOLECYSTECTOMY      COLONOSCOPY  2019    CYSTOSCOPY N/A 6/8/2023    Procedure: CYSTOSCOPY, mini TURBT with fulgeration;  Surgeon: Tee Bruno MD;  Location: [facility];  Service: Urology    EGD AND COLONOSCOPY N/A 09/12/2017    Procedure: EGD AND COLONOSCOPY;  Surgeon: Tommy Oliver MD;  Location:  GI LAB;  Service: Gastroenterology    ERCP N/A 09/15/2017    Procedure: ENDOSCOPIC RETROGRADE CHOLANGIOPANCREATOGRAPHY (ERCP);  Surgeon: Dre Soto MD;  Location:  GI LAB;   "Service: Gastroenterology    HYSTERECTOMY      KNEE SURGERY Right     LAPAROSCOPIC ENDOMETRIOSIS FULGURATION      ORIF ANKLE FRACTURE Left     MI ARTHRS KNE SURG W/MENISCECTOMY MED/LAT W/SHVG Left 05/12/2017    Procedure: POSSIBLE MEDIAL MENISECTOMY;  Surgeon: Kristian Avila MD;  Location: [facility];  Service: Orthopedics    MI ARTHRS KNEE DEBRIDEMENT/SHAVING ARTCLR CRTLG Left 05/12/2017    Procedure: KNEE ARTHROSCOPY EVALUATION, CHONDROPLASTY;  Surgeon: Kristian Avila MD;  Location: [facility];  Service: Orthopedics    MI LAPS ABD PRTM&OMENTUM DX W/WO SPEC BR/WA SPX N/A 12/12/2017    Procedure: LAPAROSCOPY DIAGNOSTIC  WITH LYSIS OF ADHESIONS;  Surgeon: Olaf John DO;  Location:  MAIN OR;  Service: General    UPPER GASTROINTESTINAL ENDOSCOPY  2019       Current Outpatient Medications   Medication Sig Dispense Refill    Ascorbic Acid (vitamin C) 1000 MG tablet Take 1,000 mg by mouth 2 (two) times a day      B-D 3CC LUER-SHAMEKA SYR 25GX1\" 25G X 1\" 3 ML MISC       butalbital-acetaminophen-caffeine (FIORICET,ESGIC) -40 mg per tablet Take 1 tablet by mouth every 8 (eight) hours as needed for migraine (Patient not taking: Reported on 3/20/2024) 20 tablet 1    cetirizine (ZyrTEC) 10 mg tablet Take 20 mg by mouth 2 (two) times a day      cholecalciferol (VITAMIN D3) 1,000 units tablet Take 5,000 Units by mouth daily      cyanocobalamin (VITAMIN B-12) 100 mcg tablet Take by mouth daily      diphenhydrAMINE (BENADRYL) 50 mg capsule Take 50 mg by mouth 4 (four) times a day as needed for itching Pt states takes 50mg daily, but may go up to 4 times daily prn      famotidine (PEPCID) 40 MG tablet TAKE ONE TABLET BY MOUTH ONCE DAILY 90 tablet 1    folic acid (KP Folic Acid) 1 mg tablet Take 1 tablet (1 mg total) by mouth daily 30 tablet 3    hydrOXYzine HCL (ATARAX) 25 mg tablet Take 25 mg by mouth daily      hyoscyamine (ANASPAZ,LEVSIN) 0.125 MG tablet Take 0.125 mg by mouth every 6 (six) hours as needed      Lactobacillus " "(PROBIOTIC ACIDOPHILUS PO) Take by mouth      Lactobacillus-Inulin ([facility]) CAPS Take 200 mg by mouth daily      leuprolide 1 mg/0.2 mL Inject 0.2 mL (1 mg total) under the skin in the morning 60 each 0    levalbuterol (XOPENEX HFA) 45 mcg/act inhaler Inhale 1-2 puffs every 4 (four) hours as needed for shortness of breath 45 g 2    levothyroxine 100 mcg tablet Take 75 mcg by mouth every other day      liothyronine (CYTOMEL) 5 mcg tablet Take 1 tablet (5 mcg total) by mouth daily Do not start before June 14, 2023. 30 tablet 0    magnesium gluconate (MAGONATE) 500 mg tablet Take 500 mg by mouth 2 (two) times a day      Melatonin 3 MG SUBL 6 mg daily at bedtime      methotrexate 50 MG/2ML injection Inject 1 mL (25 mg total) under the skin once a week 4 mL 10    mirtazapine (REMERON) 15 mg tablet Take 1 tablet (15 mg total) by mouth daily at bedtime 90 tablet 1    mometasone (ELOCON) 0.1 % cream Apply topically daily 45 g 1    montelukast (SINGULAIR) 10 mg tablet Take 1 tablet (10 mg total) by mouth daily at bedtime  0    NEEDLE, DISP, 27 G (B-D DISP NEEDLE 31PU9-6/4\") 27G X 1-1/4\" MISC Use once a week 100 each 0    predniSONE 20 mg tablet Take 40mg per day for 1 week, then 30mg per day for 1 week, then 20mg per day for 1 week and then 10mg per day for 1 week. 100 tablet 0    Riboflavin (Vitamin B-2) 100 MG TABS Take 100 mg by mouth 2 (two) times a day      risankizumab-rzaa (Skyrizi) 360 MG/2.4ML SOCT Take first on body injector (OBI) under skin on week 12 then every 8 weeks there after 2.4 mL 5    TechLite Lancets MISC        No current facility-administered medications for this visit.        Allergies   Allergen Reactions    Aimovig [Erenumab-Aooe] Anaphylaxis    Amitriptyline Anaphylaxis     According to the patient she had nphylaxis    Aspergillus Allergy Skin Test Other (See Comments), Hives and Swelling    Azathioprine Other (See Comments) and Anaphylaxis     pancreatitis  According to " "patient she needed Epipen    Buspar [Buspirone] Hives and Shortness Of Breath    Citric Acid-Polysorbate 80 Abdominal Pain, Anaphylaxis, Anxiety, Bradycardia, Diarrhea, Fatigue, GI Intolerance, Headache, Hives, Hyperactivity, Itching, Lip Swelling, Nausea Only, Palpitations, Rash, Shortness Of Breath, Tachycardia, Throat Swelling, Tongue Swelling and Vomiting    Corn Dextrin Anaphylaxis     Any corn based products    Erenumab Anaphylaxis     patient sent portal message stating she had anaphylaxis  patient sent portal message stating she had anaphylaxis      Gadolinium Abdominal Pain, Anaphylaxis, Anxiety, Confusion, Delirium, Dizziness, Eye Swelling, Fatigue, GI Intolerance, Headache, Hives, Irritability, Itching, Lip Swelling, Nausea Only, Palpitations, Photosensitivity, Rash, Seizures, Shortness Of Breath, Swelling, Syncope, Throat Swelling, Tongue Swelling, Tremor, Visual Disturbance and Vomiting    Gelatin - Food Allergy Anaphylaxis    Heparin Hives     Painful welts     Lavender Oil Anaphylaxis     Other reaction(s): anaphalxis    Malto Dextrin [Dextrin] Anaphylaxis    Penicillium Notatum Shortness Of Breath and Swelling    Red Dye - Food Allergy Anaphylaxis and Other (See Comments)     intolerance    Sumatriptan Anaphylaxis     Bradycardia, sob, back pressure, head pain,throat tightness  According to the patient she had anphylaxis    Ustekinumab Anaphylaxis    Lidocaine Hives, Itching and Rash    Contrast [Iodinated Contrast Media] Other (See Comments)     Pt states needs to be premedicated prior to injection    Corn Oil - Food Allergy - Food Allergy Hives    Humira [Adalimumab] Other (See Comments)     \"I develop drug induced lupus\"    Ibuprofen Hives and Throat Swelling    Polyethylene Glycol Diarrhea and Vomiting    Remicade [Infliximab] Other (See Comments)     \"I develop drug induced lupus\"    Sulfa Antibiotics Hives and Other (See Comments)    Sulfate Hives    Sulfites - Food Allergy Hives    Sweet Corn " Extract Skin Test - Food Allergy Hives and Itching    Chlorhexidine Itching    Freederm Adhesive Remover [New Skin] Rash    Histamine Hives, Rash and Other (See Comments)     Intolerance      Wound Dressings Rash     REVIEW OF SYSTEMS:  10 point ROS reviewed and negative, except as above        PHYSICAL EXAMINATION:  Unable to perform physical examination given the unavailability of a video application.           Visit Time  I have spent a total time of 32 minutes on 04/08/24 in caring for this patient including Diagnostic results, Prognosis, Risks and benefits of tx options, Instructions for management, Patient and family education, Importance of tx compliance, Risk factor reductions, Impressions, Counseling / Coordination of care, Documenting in the medical record, Reviewing / ordering tests, medicine, procedures  , and Obtaining or reviewing history  .

## 2024-04-09 ENCOUNTER — APPOINTMENT (OUTPATIENT)
Dept: PHYSICAL THERAPY | Facility: CLINIC | Age: 49
End: 2024-04-09
Payer: COMMERCIAL

## 2024-04-09 ENCOUNTER — HOSPITAL ENCOUNTER (OUTPATIENT)
Dept: INFUSION CENTER | Facility: HOSPITAL | Age: 49
Discharge: HOME | End: 2024-04-09
Attending: INTERNAL MEDICINE
Payer: COMMERCIAL

## 2024-04-09 VITALS
RESPIRATION RATE: 18 BRPM | TEMPERATURE: 98.4 F | DIASTOLIC BLOOD PRESSURE: 89 MMHG | SYSTOLIC BLOOD PRESSURE: 121 MMHG | HEART RATE: 100 BPM

## 2024-04-09 DIAGNOSIS — D50.0 IRON DEFICIENCY ANEMIA DUE TO CHRONIC BLOOD LOSS: ICD-10-CM

## 2024-04-09 DIAGNOSIS — R63.0 ANOREXIA: ICD-10-CM

## 2024-04-09 DIAGNOSIS — E61.1 IRON DEFICIENCY: ICD-10-CM

## 2024-04-09 DIAGNOSIS — E44.1 MILD PROTEIN-CALORIE MALNUTRITION (HCC): ICD-10-CM

## 2024-04-09 DIAGNOSIS — R11.10 VOMITING AND DIARRHEA: Primary | ICD-10-CM

## 2024-04-09 DIAGNOSIS — R19.7 VOMITING AND DIARRHEA: Primary | ICD-10-CM

## 2024-04-09 PROCEDURE — 96360 HYDRATION IV INFUSION INIT: CPT

## 2024-04-09 PROCEDURE — 96361 HYDRATE IV INFUSION ADD-ON: CPT

## 2024-04-09 RX ORDER — LEVOTHYROXINE SODIUM 0.05 MG/1
75 TABLET ORAL DAILY
COMMUNITY
Start: 2024-02-01

## 2024-04-09 RX ORDER — METHYLPREDNISOLONE SODIUM SUCCINATE 40 MG/ML
40 INJECTION, POWDER, LYOPHILIZED, FOR SOLUTION INTRAMUSCULAR; INTRAVENOUS ONCE
Status: DISCONTINUED | OUTPATIENT
Start: 2024-04-09 | End: 2024-04-12 | Stop reason: HOSPADM

## 2024-04-09 RX ORDER — METHYLPREDNISOLONE SODIUM SUCCINATE 40 MG/ML
40 INJECTION, POWDER, LYOPHILIZED, FOR SOLUTION INTRAMUSCULAR; INTRAVENOUS ONCE
Status: CANCELLED
Start: 2024-04-12 | End: 2024-04-10

## 2024-04-09 RX ORDER — ERGOCALCIFEROL 1.25 MG/1
50000 CAPSULE ORAL WEEKLY
COMMUNITY
Start: 2024-03-25

## 2024-04-09 RX ADMIN — SODIUM CHLORIDE 1000 ML: 0.9 INJECTION, SOLUTION INTRAVENOUS at 15:07

## 2024-04-09 NOTE — PROGRESS NOTES
Celine Roa tolerated Hydration treatment well with no complications. Venous access was difficult to obtain, so anesthesia was called and under ultrasound guidance and IV was placed.    Celine Roa is aware of future appt on Friday Apr 12, 2024 11:00 AM.    AVS declined by Celine Roa.

## 2024-04-11 ENCOUNTER — APPOINTMENT (OUTPATIENT)
Dept: PHYSICAL THERAPY | Facility: CLINIC | Age: 49
End: 2024-04-11
Payer: COMMERCIAL

## 2024-04-12 ENCOUNTER — HOSPITAL ENCOUNTER (OUTPATIENT)
Dept: INFUSION CENTER | Facility: HOSPITAL | Age: 49
End: 2024-04-12
Attending: INTERNAL MEDICINE
Payer: COMMERCIAL

## 2024-04-12 VITALS
TEMPERATURE: 97.8 F | SYSTOLIC BLOOD PRESSURE: 120 MMHG | DIASTOLIC BLOOD PRESSURE: 89 MMHG | RESPIRATION RATE: 18 BRPM | HEART RATE: 108 BPM

## 2024-04-12 DIAGNOSIS — D50.0 IRON DEFICIENCY ANEMIA DUE TO CHRONIC BLOOD LOSS: ICD-10-CM

## 2024-04-12 DIAGNOSIS — R19.7 VOMITING AND DIARRHEA: Primary | ICD-10-CM

## 2024-04-12 DIAGNOSIS — R63.0 ANOREXIA: ICD-10-CM

## 2024-04-12 DIAGNOSIS — R11.10 VOMITING AND DIARRHEA: Primary | ICD-10-CM

## 2024-04-12 DIAGNOSIS — E61.1 IRON DEFICIENCY: ICD-10-CM

## 2024-04-12 DIAGNOSIS — E44.1 MILD PROTEIN-CALORIE MALNUTRITION (HCC): ICD-10-CM

## 2024-04-12 PROCEDURE — 96361 HYDRATE IV INFUSION ADD-ON: CPT

## 2024-04-12 PROCEDURE — 96360 HYDRATION IV INFUSION INIT: CPT

## 2024-04-12 RX ORDER — METHYLPREDNISOLONE SODIUM SUCCINATE 40 MG/ML
40 INJECTION, POWDER, LYOPHILIZED, FOR SOLUTION INTRAMUSCULAR; INTRAVENOUS ONCE
Status: CANCELLED
Start: 2024-04-12 | End: 2024-04-13

## 2024-04-12 RX ORDER — METHYLPREDNISOLONE SODIUM SUCCINATE 40 MG/ML
40 INJECTION, POWDER, LYOPHILIZED, FOR SOLUTION INTRAMUSCULAR; INTRAVENOUS ONCE
Status: DISCONTINUED | OUTPATIENT
Start: 2024-04-12 | End: 2024-04-15 | Stop reason: HOSPADM

## 2024-04-12 RX ADMIN — SODIUM CHLORIDE 1000 ML: 0.9 INJECTION, SOLUTION INTRAVENOUS at 11:33

## 2024-04-12 NOTE — PROGRESS NOTES
Celine Roa  tolerated treatment well with no complications.      Celine Roa is aware of future appt on 4/19/24 at 1300 .

## 2024-04-15 ENCOUNTER — OFFICE VISIT (OUTPATIENT)
Dept: PHYSICAL THERAPY | Facility: CLINIC | Age: 49
End: 2024-04-15
Payer: COMMERCIAL

## 2024-04-15 DIAGNOSIS — M62.81 MUSCLE WEAKNESS (GENERALIZED): ICD-10-CM

## 2024-04-15 DIAGNOSIS — R53.81 PHYSICAL DECONDITIONING: Primary | ICD-10-CM

## 2024-04-15 PROCEDURE — 97110 THERAPEUTIC EXERCISES: CPT | Performed by: PHYSICAL THERAPIST

## 2024-04-15 PROCEDURE — 97112 NEUROMUSCULAR REEDUCATION: CPT | Performed by: PHYSICAL THERAPIST

## 2024-04-15 NOTE — PROGRESS NOTES
"Daily Note     Today's date: 4/15/2024  Patient name: Cleine Roa  : 1975  MRN: [id number]  Referring provider: Magi Hall MD  Dx:   Encounter Diagnosis     ICD-10-CM    1. Physical deconditioning  R53.81       2. Muscle weakness (generalized)  M62.81                      Subjective: Pt reports mild soreness with program last visit, Doing well overall and doing a lot of walking.       Objective: See treatment diary below      Assessment: Tolerated treatment well. Patient demonstrated fatigue post treatment and exhibited good technique with therapeutic exercises. Pt making cont gains with program. Cont to kassy increased reps and core stab with program completing with min difficulty.       Plan: Continue per plan of care.  Progress treatment as tolerated.         Precautions: None at this time    Daily Treatment Diary    HEP: Handout provided and discussed      Manuals 4/2/24 4/4 4/8 4/15/24    ART        PROM/Stretch        IASTM        STM/Triggerpoint        JM                Neuro Re-Ed        SL Balance  Next session      Wall Push Ups  3/10 3/10 3x10                                            Ther Ex        Hallway Lat Walks TB  L2 2 laps 60' L2 2laps 60' 3 laps L3    Step Ups  8\" 2/10 8\" 3/10 8'' 3x10    Sit to stands  2/10 310 3x10    Leg Press  P2 2/10 P2 3/10 P3 3x10    Lat Pull Downs  P2 2/10 P2 3/10 3x10 P2    Machine Rows  P2 2/10 P2 3/10 3x10 P2    Bridge with Add Sq  2/10 2/10 3x10    HR/TR  3/10 3/10 3x10    Leg Ext Machine  P2 /10 P2 3/10 3x10 P3    Leg Curl Machine  P5 2/10 P5 3/10 3x10 P5    PPT with march   2/10 3x10    SL Bridge    3x10 ea            Ther Activity        Incline TM Walk x10' Pt walked 2 miles prior. 6'  6' 2.2 mph 5.5 inc X6' 2.2 mph 6incline            Gait Training                        Modalities        MHP        CP        US/Stim                          "

## 2024-04-16 ENCOUNTER — HOSPITAL ENCOUNTER (OUTPATIENT)
Dept: INFUSION CENTER | Facility: HOSPITAL | Age: 49
Discharge: HOME | End: 2024-04-16
Attending: INTERNAL MEDICINE
Payer: COMMERCIAL

## 2024-04-16 ENCOUNTER — OFFICE VISIT (OUTPATIENT)
Dept: PHYSICAL THERAPY | Facility: CLINIC | Age: 49
End: 2024-04-16
Payer: COMMERCIAL

## 2024-04-16 VITALS
RESPIRATION RATE: 17 BRPM | SYSTOLIC BLOOD PRESSURE: 122 MMHG | DIASTOLIC BLOOD PRESSURE: 84 MMHG | OXYGEN SATURATION: 93 % | HEART RATE: 106 BPM | TEMPERATURE: 98.3 F

## 2024-04-16 DIAGNOSIS — E44.1 MILD PROTEIN-CALORIE MALNUTRITION (HCC): ICD-10-CM

## 2024-04-16 DIAGNOSIS — E61.1 IRON DEFICIENCY: ICD-10-CM

## 2024-04-16 DIAGNOSIS — R53.81 PHYSICAL DECONDITIONING: Primary | ICD-10-CM

## 2024-04-16 DIAGNOSIS — M62.81 MUSCLE WEAKNESS (GENERALIZED): ICD-10-CM

## 2024-04-16 DIAGNOSIS — L50.1 CHRONIC IDIOPATHIC URTICARIA: Primary | ICD-10-CM

## 2024-04-16 DIAGNOSIS — D50.0 IRON DEFICIENCY ANEMIA DUE TO CHRONIC BLOOD LOSS: ICD-10-CM

## 2024-04-16 DIAGNOSIS — R19.7 VOMITING AND DIARRHEA: ICD-10-CM

## 2024-04-16 DIAGNOSIS — R63.0 ANOREXIA: ICD-10-CM

## 2024-04-16 DIAGNOSIS — R11.10 VOMITING AND DIARRHEA: ICD-10-CM

## 2024-04-16 PROCEDURE — 96367 TX/PROPH/DG ADDL SEQ IV INF: CPT

## 2024-04-16 PROCEDURE — 96375 TX/PRO/DX INJ NEW DRUG ADDON: CPT

## 2024-04-16 PROCEDURE — 96372 THER/PROPH/DIAG INJ SC/IM: CPT

## 2024-04-16 PROCEDURE — 96365 THER/PROPH/DIAG IV INF INIT: CPT

## 2024-04-16 PROCEDURE — 96361 HYDRATE IV INFUSION ADD-ON: CPT

## 2024-04-16 PROCEDURE — 97110 THERAPEUTIC EXERCISES: CPT

## 2024-04-16 RX ORDER — EPINEPHRINE 1 MG/ML
0.3 INJECTION, SOLUTION, CONCENTRATE INTRAVENOUS
Status: DISCONTINUED | OUTPATIENT
Start: 2024-04-16 | End: 2024-04-19 | Stop reason: HOSPADM

## 2024-04-16 RX ORDER — EPINEPHRINE 1 MG/ML
0.3 INJECTION, SOLUTION, CONCENTRATE INTRAVENOUS
OUTPATIENT
Start: 2024-04-19

## 2024-04-16 RX ORDER — METHYLPREDNISOLONE SODIUM SUCCINATE 40 MG/ML
40 INJECTION, POWDER, LYOPHILIZED, FOR SOLUTION INTRAMUSCULAR; INTRAVENOUS ONCE
Start: 2024-04-17 | End: 2024-04-17

## 2024-04-16 RX ORDER — METHYLPREDNISOLONE SODIUM SUCCINATE 40 MG/ML
40 INJECTION, POWDER, LYOPHILIZED, FOR SOLUTION INTRAMUSCULAR; INTRAVENOUS ONCE
Status: COMPLETED | OUTPATIENT
Start: 2024-04-16 | End: 2024-04-16

## 2024-04-16 RX ADMIN — SODIUM CHLORIDE 1000 ML: 0.9 INJECTION, SOLUTION INTRAVENOUS at 13:45

## 2024-04-16 RX ADMIN — OMALIZUMAB 300 MG: 150 INJECTION, SOLUTION SUBCUTANEOUS at 15:45

## 2024-04-16 RX ADMIN — METHYLPREDNISOLONE SODIUM SUCCINATE 40 MG: 40 INJECTION, POWDER, FOR SOLUTION INTRAMUSCULAR; INTRAVENOUS at 15:28

## 2024-04-16 RX ADMIN — FAMOTIDINE 40 MG: 10 INJECTION, SOLUTION INTRAVENOUS at 14:51

## 2024-04-16 RX ADMIN — DIPHENHYDRAMINE HYDROCHLORIDE 50 MG: 50 INJECTION, SOLUTION INTRAMUSCULAR; INTRAVENOUS at 14:06

## 2024-04-16 NOTE — PROGRESS NOTES
"Daily Note     Today's date: 2024  Patient name: Celine Roa  : 1975  MRN: [id number]  Referring provider: Magi Hall MD  Dx:   Encounter Diagnosis     ICD-10-CM    1. Physical deconditioning  R53.81       2. Muscle weakness (generalized)  M62.81                      Subjective:  Celine reports that her lower legs and knees were more sore than usual last night.    Patient reports that she has a time restraint today so she may need to do some exercises at home.       Objective: See treatment diary below      Assessment: Patient tolerated treatment well. Patient performed ex as noted.  Patient performed ex without difficulty or complaint.  Appropriate challenge and fatigue was noted.  Patient would benefit from continued PT intervention to address deficits and attain set goals.       Plan: Continue per plan of care.      Precautions: None at this time    Daily Treatment Diary    HEP: Handout provided and discussed      Manuals 4/2/24 4/4 4/8 4/15/24 4/16   ART        PROM/Stretch        IASTM        STM/Triggerpoint        JM                Neuro Re-Ed        SL Balance  Next session      Wall Push Ups  3/10 3/10 3x10 np                                           Ther Ex        Hallway Lat Walks TB  L2 2 laps 60' L2 2laps 60' 3 laps L3 L3  3 laps   Step Ups  8\" 2/10 8\" 3/10 8'' 3x10 np   Sit to stands  2/10 3/10 3x10 np   Leg Press  P2 2/10 P2 3/10 P3 3x10 P3  3x10   Lat Pull Downs  P2 2/10 P2 3/10 3x10 P2 P2  3x10   Machine Rows  P2 2/10 P2 3/10 3x10 P2 P2  3x10   Bridge with Add Sq  2/10 2/10 3x10 np   HR/TR  3/10 3/10 3x10 np   Leg Ext Machine  P2 2/10 P2 3/10 3x10 P3 P2  3x10   Leg Curl Machine  P5 2/10 P5 3/10 3x10 P5 P5  3x10   PPT with march   2/10 3x10 np   SL Bridge    3x10 ea np           Ther Activity        Incline TM Walk x10' Pt walked 2 miles prior. 6'  6' 2.2 mph 5.5 inc X6' 2.2 mph 6incline X6'  2.2-3.0  9% incline           Gait Training                        Modalities      "   MHP        CP        US/Stim

## 2024-04-16 NOTE — PROGRESS NOTES
Celine Roa  tolerated treatment well with no complications.      Celine Roa is aware of future appt on 5/2/24 at 10.     AVS printed and given to Celine Roa:  No (Declined by Celine Roa) x

## 2024-04-18 ENCOUNTER — APPOINTMENT (OUTPATIENT)
Dept: PHYSICAL THERAPY | Facility: CLINIC | Age: 49
End: 2024-04-18
Payer: COMMERCIAL

## 2024-04-18 DIAGNOSIS — R53.81 PHYSICAL DECONDITIONING: Primary | ICD-10-CM

## 2024-04-22 ENCOUNTER — APPOINTMENT (OUTPATIENT)
Dept: PHYSICAL THERAPY | Facility: CLINIC | Age: 49
End: 2024-04-22
Payer: COMMERCIAL

## 2024-04-23 ENCOUNTER — APPOINTMENT (OUTPATIENT)
Dept: PHYSICAL THERAPY | Facility: CLINIC | Age: 49
End: 2024-04-23
Payer: COMMERCIAL

## 2024-04-25 ENCOUNTER — APPOINTMENT (OUTPATIENT)
Dept: PHYSICAL THERAPY | Facility: CLINIC | Age: 49
End: 2024-04-25
Payer: COMMERCIAL

## 2024-04-26 ENCOUNTER — APPOINTMENT (OUTPATIENT)
Dept: PHYSICAL THERAPY | Facility: CLINIC | Age: 49
End: 2024-04-26
Payer: COMMERCIAL

## 2024-04-29 ENCOUNTER — APPOINTMENT (OUTPATIENT)
Dept: PHYSICAL THERAPY | Facility: CLINIC | Age: 49
End: 2024-04-29
Payer: COMMERCIAL

## 2024-04-30 ENCOUNTER — APPOINTMENT (OUTPATIENT)
Dept: PHYSICAL THERAPY | Facility: CLINIC | Age: 49
End: 2024-04-30
Payer: COMMERCIAL

## 2024-05-02 ENCOUNTER — HOSPITAL ENCOUNTER (OUTPATIENT)
Dept: INFUSION CENTER | Facility: HOSPITAL | Age: 49
Discharge: HOME | End: 2024-05-02
Attending: INTERNAL MEDICINE
Payer: COMMERCIAL

## 2024-05-02 VITALS
DIASTOLIC BLOOD PRESSURE: 85 MMHG | OXYGEN SATURATION: 98 % | SYSTOLIC BLOOD PRESSURE: 120 MMHG | TEMPERATURE: 97.6 F | HEART RATE: 82 BPM | RESPIRATION RATE: 17 BRPM

## 2024-05-02 DIAGNOSIS — E44.1 MILD PROTEIN-CALORIE MALNUTRITION (HCC): ICD-10-CM

## 2024-05-02 DIAGNOSIS — D50.0 IRON DEFICIENCY ANEMIA DUE TO CHRONIC BLOOD LOSS: ICD-10-CM

## 2024-05-02 DIAGNOSIS — R11.10 VOMITING AND DIARRHEA: Primary | ICD-10-CM

## 2024-05-02 DIAGNOSIS — R19.7 VOMITING AND DIARRHEA: Primary | ICD-10-CM

## 2024-05-02 DIAGNOSIS — R63.0 ANOREXIA: ICD-10-CM

## 2024-05-02 DIAGNOSIS — E61.1 IRON DEFICIENCY: ICD-10-CM

## 2024-05-02 PROCEDURE — 96365 THER/PROPH/DIAG IV INF INIT: CPT

## 2024-05-02 PROCEDURE — 96367 TX/PROPH/DG ADDL SEQ IV INF: CPT

## 2024-05-02 PROCEDURE — 96375 TX/PRO/DX INJ NEW DRUG ADDON: CPT

## 2024-05-02 PROCEDURE — 96361 HYDRATE IV INFUSION ADD-ON: CPT

## 2024-05-02 RX ORDER — METHYLPREDNISOLONE SODIUM SUCCINATE 40 MG/ML
40 INJECTION, POWDER, LYOPHILIZED, FOR SOLUTION INTRAMUSCULAR; INTRAVENOUS ONCE
Status: COMPLETED | OUTPATIENT
Start: 2024-05-02 | End: 2024-05-02

## 2024-05-02 RX ORDER — METHYLPREDNISOLONE SODIUM SUCCINATE 40 MG/ML
40 INJECTION, POWDER, LYOPHILIZED, FOR SOLUTION INTRAMUSCULAR; INTRAVENOUS ONCE
Status: CANCELLED
Start: 2024-05-03 | End: 2024-05-03

## 2024-05-02 RX ADMIN — METHYLPREDNISOLONE SODIUM SUCCINATE 40 MG: 40 INJECTION, POWDER, FOR SOLUTION INTRAMUSCULAR; INTRAVENOUS at 12:29

## 2024-05-02 RX ADMIN — SODIUM CHLORIDE 1000 ML: 0.9 INJECTION, SOLUTION INTRAVENOUS at 10:30

## 2024-05-02 RX ADMIN — DIPHENHYDRAMINE HYDROCHLORIDE 50 MG: 50 INJECTION, SOLUTION INTRAMUSCULAR; INTRAVENOUS at 11:44

## 2024-05-02 RX ADMIN — FAMOTIDINE 40 MG: 10 INJECTION, SOLUTION INTRAVENOUS at 11:05

## 2024-05-02 NOTE — PROGRESS NOTES
Celine Roa  tolerated treatment well with no complications.      Celine Roa is aware of future appt on 5/14/24 at 1330.     AVS printed and given to Celine Roa:   No (Declined by Celine oRa) x       Patient/Father

## 2024-05-14 ENCOUNTER — APPOINTMENT (OUTPATIENT)
Dept: LAB | Facility: HOSPITAL | Age: 49
End: 2024-05-14
Payer: COMMERCIAL

## 2024-05-14 ENCOUNTER — HOSPITAL ENCOUNTER (OUTPATIENT)
Dept: INFUSION CENTER | Facility: HOSPITAL | Age: 49
Discharge: HOME | End: 2024-05-14
Attending: INTERNAL MEDICINE
Payer: COMMERCIAL

## 2024-05-14 VITALS
SYSTOLIC BLOOD PRESSURE: 127 MMHG | OXYGEN SATURATION: 98 % | TEMPERATURE: 96.9 F | RESPIRATION RATE: 17 BRPM | DIASTOLIC BLOOD PRESSURE: 69 MMHG | HEART RATE: 69 BPM

## 2024-05-14 DIAGNOSIS — K50.119 CROHN'S DISEASE OF COLON WITH COMPLICATION (HCC): ICD-10-CM

## 2024-05-14 DIAGNOSIS — E61.1 IRON DEFICIENCY: ICD-10-CM

## 2024-05-14 DIAGNOSIS — R51.9 CHRONIC INTRACTABLE HEADACHE, UNSPECIFIED HEADACHE TYPE: ICD-10-CM

## 2024-05-14 DIAGNOSIS — L50.1 CHRONIC IDIOPATHIC URTICARIA: Primary | ICD-10-CM

## 2024-05-14 DIAGNOSIS — G89.29 CHRONIC INTRACTABLE HEADACHE, UNSPECIFIED HEADACHE TYPE: ICD-10-CM

## 2024-05-14 DIAGNOSIS — D50.0 IRON DEFICIENCY ANEMIA DUE TO CHRONIC BLOOD LOSS: ICD-10-CM

## 2024-05-14 DIAGNOSIS — D47.09 NEOPLASM OF UNCERTAIN BEHAVIOR OF HISTIOCYTIC AND MAST CELLS: ICD-10-CM

## 2024-05-14 DIAGNOSIS — R11.10 VOMITING AND DIARRHEA: ICD-10-CM

## 2024-05-14 DIAGNOSIS — R19.7 VOMITING AND DIARRHEA: ICD-10-CM

## 2024-05-14 DIAGNOSIS — R19.7 DIARRHEA, UNSPECIFIED TYPE: ICD-10-CM

## 2024-05-14 DIAGNOSIS — E44.1 MILD PROTEIN-CALORIE MALNUTRITION (HCC): ICD-10-CM

## 2024-05-14 DIAGNOSIS — R90.82 WHITE MATTER DISEASE, UNSPECIFIED: ICD-10-CM

## 2024-05-14 DIAGNOSIS — E56.8 DEFICIENCY OF OTHER VITAMINS: ICD-10-CM

## 2024-05-14 DIAGNOSIS — R63.0 ANOREXIA: ICD-10-CM

## 2024-05-14 LAB
25(OH)D3 SERPL-MCNC: 32.5 NG/ML (ref 30–100)
ALBUMIN SERPL BCP-MCNC: 3.9 G/DL (ref 3.5–5)
ALP SERPL-CCNC: 90 U/L (ref 34–104)
ALT SERPL W P-5'-P-CCNC: 24 U/L (ref 7–52)
ANION GAP SERPL CALCULATED.3IONS-SCNC: 11 MMOL/L (ref 4–13)
AST SERPL W P-5'-P-CCNC: 20 U/L (ref 13–39)
BASOPHILS # BLD AUTO: 0.1 THOUSANDS/ÂΜL (ref 0–0.1)
BASOPHILS NFR BLD AUTO: 2 % (ref 0–1)
BILIRUB SERPL-MCNC: 0.24 MG/DL (ref 0.2–1)
BUN SERPL-MCNC: 14 MG/DL (ref 5–25)
CALCIUM SERPL-MCNC: 8.7 MG/DL (ref 8.4–10.2)
CHLORIDE SERPL-SCNC: 108 MMOL/L (ref 96–108)
CO2 SERPL-SCNC: 20 MMOL/L (ref 21–32)
CREAT SERPL-MCNC: 0.65 MG/DL (ref 0.6–1.3)
CRP SERPL QL: 7.2 MG/L
EOSINOPHIL # BLD AUTO: 0.19 THOUSAND/ÂΜL (ref 0–0.61)
EOSINOPHIL NFR BLD AUTO: 3 % (ref 0–6)
ERYTHROCYTE [DISTWIDTH] IN BLOOD BY AUTOMATED COUNT: 12.9 % (ref 11.6–15.1)
FERRITIN SERPL-MCNC: 179 NG/ML (ref 11–307)
FOLATE SERPL-MCNC: 19.1 NG/ML
GFR SERPL CREATININE-BSD FRML MDRD: 103 ML/MIN/1.73SQ M
GLUCOSE SERPL-MCNC: 83 MG/DL (ref 65–140)
HCT VFR BLD AUTO: 43.8 % (ref 34.8–46.1)
HGB BLD-MCNC: 14.4 G/DL (ref 11.5–15.4)
IMM GRANULOCYTES # BLD AUTO: 0.01 THOUSAND/UL (ref 0–0.2)
IMM GRANULOCYTES NFR BLD AUTO: 0 % (ref 0–2)
IRON SATN MFR SERPL: 28 % (ref 15–50)
IRON SERPL-MCNC: 83 UG/DL (ref 50–212)
LYMPHOCYTES # BLD AUTO: 1.93 THOUSANDS/ÂΜL (ref 0.6–4.47)
LYMPHOCYTES NFR BLD AUTO: 32 % (ref 14–44)
MCH RBC QN AUTO: 32.5 PG (ref 26.8–34.3)
MCHC RBC AUTO-ENTMCNC: 32.9 G/DL (ref 31.4–37.4)
MCV RBC AUTO: 99 FL (ref 82–98)
MONOCYTES # BLD AUTO: 0.63 THOUSAND/ÂΜL (ref 0.17–1.22)
MONOCYTES NFR BLD AUTO: 10 % (ref 4–12)
NEUTROPHILS # BLD AUTO: 3.2 THOUSANDS/ÂΜL (ref 1.85–7.62)
NEUTS SEG NFR BLD AUTO: 53 % (ref 43–75)
NRBC BLD AUTO-RTO: 0 /100 WBCS
PLATELET # BLD AUTO: 402 THOUSANDS/UL (ref 149–390)
PMV BLD AUTO: 9.1 FL (ref 8.9–12.7)
POTASSIUM SERPL-SCNC: 4.1 MMOL/L (ref 3.5–5.3)
PROT SERPL-MCNC: 6.7 G/DL (ref 6.4–8.4)
RBC # BLD AUTO: 4.43 MILLION/UL (ref 3.81–5.12)
SODIUM SERPL-SCNC: 139 MMOL/L (ref 135–147)
T4 FREE SERPL-MCNC: 0.65 NG/DL (ref 0.61–1.12)
TIBC SERPL-MCNC: 295 UG/DL (ref 250–450)
TSH SERPL DL<=0.05 MIU/L-ACNC: 1.01 UIU/ML (ref 0.45–4.5)
UIBC SERPL-MCNC: 212 UG/DL (ref 155–355)
WBC # BLD AUTO: 6.06 THOUSAND/UL (ref 4.31–10.16)

## 2024-05-14 PROCEDURE — 82306 VITAMIN D 25 HYDROXY: CPT

## 2024-05-14 PROCEDURE — 84446 ASSAY OF VITAMIN E: CPT

## 2024-05-14 PROCEDURE — 82728 ASSAY OF FERRITIN: CPT

## 2024-05-14 PROCEDURE — 36415 COLL VENOUS BLD VENIPUNCTURE: CPT

## 2024-05-14 PROCEDURE — 84439 ASSAY OF FREE THYROXINE: CPT

## 2024-05-14 PROCEDURE — 84590 ASSAY OF VITAMIN A: CPT

## 2024-05-14 PROCEDURE — 84443 ASSAY THYROID STIM HORMONE: CPT

## 2024-05-14 PROCEDURE — 80053 COMPREHEN METABOLIC PANEL: CPT

## 2024-05-14 PROCEDURE — 82525 ASSAY OF COPPER: CPT

## 2024-05-14 PROCEDURE — 82180 ASSAY OF ASCORBIC ACID: CPT

## 2024-05-14 PROCEDURE — 83540 ASSAY OF IRON: CPT

## 2024-05-14 PROCEDURE — 86140 C-REACTIVE PROTEIN: CPT

## 2024-05-14 PROCEDURE — 82380 ASSAY OF CAROTENE: CPT

## 2024-05-14 PROCEDURE — 83550 IRON BINDING TEST: CPT

## 2024-05-14 PROCEDURE — 85025 COMPLETE CBC W/AUTO DIFF WBC: CPT

## 2024-05-14 PROCEDURE — 82746 ASSAY OF FOLIC ACID SERUM: CPT

## 2024-05-14 RX ORDER — EPINEPHRINE 1 MG/ML
0.3 INJECTION, SOLUTION, CONCENTRATE INTRAVENOUS
Status: DISCONTINUED | OUTPATIENT
Start: 2024-05-14 | End: 2024-05-17 | Stop reason: HOSPADM

## 2024-05-14 RX ORDER — EPINEPHRINE 1 MG/ML
0.3 INJECTION, SOLUTION, CONCENTRATE INTRAVENOUS
OUTPATIENT
Start: 2024-06-11

## 2024-05-14 RX ORDER — METHYLPREDNISOLONE SODIUM SUCCINATE 40 MG/ML
40 INJECTION, POWDER, LYOPHILIZED, FOR SOLUTION INTRAMUSCULAR; INTRAVENOUS ONCE
Status: DISCONTINUED | OUTPATIENT
Start: 2024-05-14 | End: 2024-05-17 | Stop reason: HOSPADM

## 2024-05-14 RX ORDER — METHYLPREDNISOLONE SODIUM SUCCINATE 40 MG/ML
40 INJECTION, POWDER, LYOPHILIZED, FOR SOLUTION INTRAMUSCULAR; INTRAVENOUS ONCE
Start: 2024-05-28 | End: 2024-05-15

## 2024-05-14 RX ADMIN — SODIUM CHLORIDE 1000 ML: 0.9 INJECTION, SOLUTION INTRAVENOUS at 12:45

## 2024-05-14 RX ADMIN — OMALIZUMAB 300 MG: 150 INJECTION, SOLUTION SUBCUTANEOUS at 14:58

## 2024-05-14 NOTE — PROGRESS NOTES
Celine Roa  tolerated treatment well with no complications.      Celine Roa is aware of future appt on 5/28/24 at 0930.     AVS printed and given to Celine Roa:  No (Declined by Celine Roa) x

## 2024-05-17 LAB — COPPER SERPL-MCNC: 84 UG/DL (ref 80–158)

## 2024-05-18 LAB — CAROTENE SERPL-MCNC: 11 UG/DL (ref 3–91)

## 2024-05-19 LAB — VIT C SERPL-MCNC: 0.7 MG/DL (ref 0.4–2)

## 2024-05-20 ENCOUNTER — OFFICE VISIT (OUTPATIENT)
Dept: FAMILY MEDICINE CLINIC | Facility: HOME HEALTHCARE | Age: 49
End: 2024-05-20
Payer: COMMERCIAL

## 2024-05-20 VITALS
DIASTOLIC BLOOD PRESSURE: 84 MMHG | SYSTOLIC BLOOD PRESSURE: 105 MMHG | TEMPERATURE: 98.3 F | BODY MASS INDEX: 27.16 KG/M2 | RESPIRATION RATE: 18 BRPM | HEIGHT: 65 IN | WEIGHT: 163 LBS | HEART RATE: 112 BPM | OXYGEN SATURATION: 97 %

## 2024-05-20 DIAGNOSIS — K50.119 CROHN'S DISEASE OF COLON WITH COMPLICATION (HCC): Primary | ICD-10-CM

## 2024-05-20 DIAGNOSIS — G35 MS (MULTIPLE SCLEROSIS) (HCC): ICD-10-CM

## 2024-05-20 DIAGNOSIS — E03.9 ACQUIRED HYPOTHYROIDISM: ICD-10-CM

## 2024-05-20 DIAGNOSIS — D89.40 MAST CELL ACTIVATION SYNDROME (HCC): ICD-10-CM

## 2024-05-20 PROCEDURE — 99213 OFFICE O/P EST LOW 20 MIN: CPT | Performed by: FAMILY MEDICINE

## 2024-05-20 NOTE — PROGRESS NOTES
Ambulatory Visit  Name: Celine Roa      : 1975      MRN: [id number]  Encounter Provider: Olaf Rhodes MD  Encounter Date: 2024   Encounter department: [facility]    Assessment & Plan   Mast cell activation  Crohn's  MS  hypothyroid   Continue current care    History of Present Illness     F/up medical problems. Has future with [facility] hematology, neurology, immunology, as well as [facility] GI, cardiology. Feels well with good activity level and appetite. HA's better with Magnesium but not completely gone. Using benadryl and Singulair for mast cell  disease. Yesterday had to use dose of atarax in addition as she helped her daughter move.      Review of Systems   Constitutional:  Positive for activity change. Negative for diaphoresis and fever.        Walking more; working at [employer]   HENT:  Negative for trouble swallowing.    Respiratory:  Negative for shortness of breath.    Cardiovascular:  Negative for chest pain and palpitations.   Gastrointestinal:  Negative for blood in stool, constipation, diarrhea, nausea and vomiting.        Crohn's controlled well - infusions decreased to every other week   Genitourinary:  Negative for difficulty urinating.   Skin:  Negative for rash.   Neurological:  Negative for weakness and numbness.   Psychiatric/Behavioral:  Negative for confusion.      Past Medical History:   Diagnosis Date    Anemia 2007    Anxiety     Asthma     Bile duct stricture     Sphincter of oddi dysfunction    Chronic kidney disease     Closed bimalleolar fracture 2024    Colon polyp 1998    Crohn's colitis (HCC)     Cyst of ovary 2024    Deep vein thrombosis (HCC)     Disease of thyroid gland     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    Generalized anxiety disorder 2017    GERD (gastroesophageal reflux disease) 1995    GI (gastrointestinal bleed) 1994    Heart murmur     Inflammatory bowel disease      Irritable bowel syndrome 2016    Lactose intolerance 1982    Mast cell disease     Migraine     Myocardial infarction (HCC)     NSTEMI. pt denies, documented in cardio note    Pain of right thigh 02/13/2023    Pancreatitis     sphinger of     Psychiatric disorder     RA (rheumatoid arthritis) (HCC)     Seizures (HCC)     Sepsis without acute organ dysfunction (HCC)     SIRS (systemic inflammatory response syndrome) (HCC)     Thrombocytosis 04/05/2022    Ulcerative colitis (HCC)     Visual impairment      Past Surgical History:   Procedure Laterality Date    ABDOMINAL SURGERY  2017    APPENDECTOMY      CHOLECYSTECTOMY      COLONOSCOPY  2019    CYSTOSCOPY N/A 06/08/2023    Procedure: CYSTOSCOPY, mini TURBT with fulgeration;  Surgeon: Tee Bruno MD;  Location: [facility];  Service: Urology    EGD AND COLONOSCOPY N/A 09/12/2017    Procedure: EGD AND COLONOSCOPY;  Surgeon: Tommy Oliver MD;  Location: [facility];  Service: Gastroenterology    ERCP N/A 09/15/2017    Procedure: ENDOSCOPIC RETROGRADE CHOLANGIOPANCREATOGRAPHY (ERCP);  Surgeon: Dre Soto MD;  Location: [facility];  Service: Gastroenterology    ERCP  02/14/2024    HYSTERECTOMY      KNEE SURGERY Right     LAPAROSCOPIC ENDOMETRIOSIS FULGURATION      ORIF ANKLE FRACTURE Left     MA ARTHRS KNE SURG W/MENISCECTOMY MED/LAT W/SHVG Left 05/12/2017    Procedure: POSSIBLE MEDIAL MENISECTOMY;  Surgeon: Kristian Avila MD;  Location: [facility];  Service: Orthopedics    MA ARTHRS KNEE DEBRIDEMENT/SHAVING ARTCLR CRTLG Left 05/12/2017    Procedure: KNEE ARTHROSCOPY EVALUATION, CHONDROPLASTY;  Surgeon: Kristian Avila MD;  Location: [facility];  Service: Orthopedics    MA LAPS ABD PRTM&OMENTUM DX W/WO SPEC BR/WA SPX N/A 12/12/2017    Procedure: LAPAROSCOPY DIAGNOSTIC  WITH LYSIS OF ADHESIONS;  Surgeon: Olaf John DO;  Location:  MAIN OR;  Service: General    UPPER GASTROINTESTINAL ENDOSCOPY  2019     Family History   Problem Relation Age of Onset    Ulcerative colitis  "Mother     Arthritis Mother     Colon polyps Mother     Heart failure Father     Neuropathy Father     Macular degeneration Father     Diabetes Father     Early death Father     Vision loss Father     Asthma Daughter     Hypothyroidism Daughter     Heart disease Maternal Grandmother     Arthritis Maternal Grandmother     No Known Problems Maternal Grandfather     Arthritis Paternal Grandmother     Macular degeneration Paternal Grandmother     Vision loss Paternal Grandmother     Lymphoma Paternal Grandfather     Cancer Paternal Grandfather     Early death Paternal Grandfather     Breast cancer Maternal Aunt     Cancer Maternal Aunt     Miscarriages / Stillbirths Maternal Aunt     Heart failure Paternal Aunt     Heart failure Paternal Aunt     Irritable bowel syndrome Paternal Aunt     Breast cancer Paternal Aunt 54    Breast cancer additional onset Paternal Aunt 58    Hypothyroidism Paternal Aunt     Cancer Paternal Aunt     No Known Problems Son     No Known Problems Son     Kidney disease Brother     Depression Paternal Uncle     Early death Paternal Uncle      Social History     Tobacco Use    Smoking status: Never    Smokeless tobacco: Never   Vaping Use    Vaping status: Never Used   Substance and Sexual Activity    Alcohol use: Never    Drug use: Never    Sexual activity: Not Currently     Partners: Male     Birth control/protection: Other     Comment: Full hysterectomy     Current Outpatient Medications on File Prior to Visit   Medication Sig    Ascorbic Acid (vitamin C) 1000 MG tablet Take 1,000 mg by mouth 2 (two) times a day    B Complex Vitamins (VITAMIN B COMPLEX PO) Take 100 mg by mouth daily    B-D 3CC LUER-SHAMEKA SYR 25GX1\" 25G X 1\" 3 ML MISC     cetirizine (ZyrTEC) 10 mg tablet Take 20 mg by mouth 2 (two) times a day    cholecalciferol (VITAMIN D3) 1,000 units tablet Take 5,000 Units by mouth daily    cyanocobalamin (VITAMIN B-12) 100 mcg tablet Take by mouth daily    diphenhydrAMINE (BENADRYL) 50 mg " "capsule Take 50 mg by mouth 4 (four) times a day as needed for itching Pt states takes 50mg daily, but may go up to 4 times daily prn    ergocalciferol (VITAMIN D2) 50,000 units Take 50,000 Units by mouth once a week    famotidine (PEPCID) 40 MG tablet TAKE ONE TABLET BY MOUTH ONCE DAILY    folic acid (KP Folic Acid) 1 mg tablet Take 1 tablet (1 mg total) by mouth daily    hydrOXYzine HCL (ATARAX) 25 mg tablet Take 25 mg by mouth daily    HYDROXYZINE HCL PO Take 25 mg by mouth 4 (four) times a day as needed    hyoscyamine (ANASPAZ,LEVSIN) 0.125 MG tablet Take 0.125 mg by mouth every 6 (six) hours as needed    Lactobacillus (PROBIOTIC ACIDOPHILUS PO) Take by mouth    Lactobacillus-Inulin ([facility]) CAPS Take 200 mg by mouth daily    levalbuterol (XOPENEX HFA) 45 mcg/act inhaler Inhale 1-2 puffs every 4 (four) hours as needed for shortness of breath    levothyroxine 50 mcg tablet Take 75 mcg by mouth daily    liothyronine (CYTOMEL) 5 mcg tablet Take 1 tablet (5 mcg total) by mouth daily Do not start before June 14, 2023.    magnesium gluconate (MAGONATE) 500 mg tablet Take 500 mg by mouth 2 (two) times a day    Melatonin 3 MG SUBL 6 mg daily at bedtime    Methotrexate 25 MG/ML SOSY Inject 50 mg/m2 into a muscle    methotrexate 50 MG/2ML injection Inject 1 mL (25 mg total) under the skin once a week    mirtazapine (REMERON) 15 mg tablet Take 1 tablet (15 mg total) by mouth daily at bedtime    mometasone (ELOCON) 0.1 % cream Apply topically daily    montelukast (SINGULAIR) 10 mg tablet Take 1 tablet (10 mg total) by mouth daily at bedtime    NEEDLE, DISP, 27 G (B-D DISP NEEDLE 07GF2-6/4\") 27G X 1-1/4\" MISC Use once a week    Omalizumab 300 MG/2  ML SOSY Inject 300 mg under the skin every 21 days    PEPPERMINT OIL PO Take 1 capsule by mouth 2 (two) times a day    predniSONE 20 mg tablet Take 40mg per day for 1 week, then 30mg per day for 1 week, then 20mg per day for 1 week and then 10mg per day for 1 " week.    Probiotic Product (CULTURELLE ABDOMINAL SUPPORT PO) Take 200 mg by mouth daily    Riboflavin (Vitamin B-2) 100 MG TABS Take 100 mg by mouth 2 (two) times a day    risankizumab-rzaa (Skyrizi) 360 MG/2.4ML SOCT Take first on body injector (OBI) under skin on week 12 then every 8 weeks there after    TechLite Lancets MISC     butalbital-acetaminophen-caffeine (FIORICET,ESGIC) -40 mg per tablet Take 1 tablet by mouth every 8 (eight) hours as needed for migraine (Patient not taking: Reported on 3/20/2024)    leuprolide 1 mg/0.2 mL Inject 0.2 mL (1 mg total) under the skin in the morning     Allergies   Allergen Reactions    Aimovig [Erenumab-Aooe] Anaphylaxis    Amitriptyline Anaphylaxis     According to the patient she had nphylaxis    Aspergillus Allergy Skin Test Other (See Comments), Hives and Swelling    Azathioprine Other (See Comments) and Anaphylaxis     pancreatitis  According to patient she needed Epipen    Buspar [Buspirone] Hives and Shortness Of Breath    Citric Acid-Polysorbate 80 Abdominal Pain, Anaphylaxis, Anxiety, Bradycardia, Diarrhea, Fatigue, GI Intolerance, Headache, Hives, Hyperactivity, Itching, Lip Swelling, Nausea Only, Palpitations, Rash, Shortness Of Breath, Tachycardia, Throat Swelling, Tongue Swelling and Vomiting    Corn Dextrin Anaphylaxis     Any corn based products    Erenumab Anaphylaxis     patient sent portal message stating she had anaphylaxis  patient sent portal message stating she had anaphylaxis      Gadolinium Abdominal Pain, Anaphylaxis, Anxiety, Confusion, Delirium, Dizziness, Eye Swelling, Fatigue, GI Intolerance, Headache, Hives, Irritability, Itching, Lip Swelling, Nausea Only, Palpitations, Photosensitivity, Rash, Seizures, Shortness Of Breath, Swelling, Syncope, Throat Swelling, Tongue Swelling, Tremor, Visual Disturbance and Vomiting    Gelatin - Food Allergy Anaphylaxis    Heparin Hives     Painful welts     Lavender Oil Anaphylaxis     Other reaction(s):  "anaphalxis    Malto Dextrin [Dextrin] Anaphylaxis    Penicillium Notatum Shortness Of Breath and Swelling    Red Dye - Food Allergy Anaphylaxis and Other (See Comments)     intolerance    Sumatriptan Anaphylaxis     Bradycardia, sob, back pressure, head pain,throat tightness  According to the patient she had anphylaxis    Ustekinumab Anaphylaxis    Lidocaine Hives, Itching and Rash    Contrast [Iodinated Contrast Media] Other (See Comments)     Pt states needs to be premedicated prior to injection    Corn Oil - Food Allergy - Food Allergy Hives    Humira [Adalimumab] Other (See Comments)     \"I develop drug induced lupus\"    Ibuprofen Hives and Throat Swelling    Polyethylene Glycol Diarrhea and Vomiting    Remicade [Infliximab] Other (See Comments)     \"I develop drug induced lupus\"    Sulfa Antibiotics Hives and Other (See Comments)    Sulfate Hives    Sulfites - Food Allergy Hives    Sweet Corn Extract Skin Test - Food Allergy Hives and Itching    Chlorhexidine Itching    Freederm Adhesive Remover [New Skin] Rash    Histamine Hives, Rash and Other (See Comments)     Intolerance      Wound Dressings Rash     Immunization History   Administered Date(s) Administered    COVID-19 MODERNA VACC 0.5 ML IM 03/26/2021, 04/28/2021, 04/10/2022, 06/08/2022    COVID-19 Moderna Vac BIVALENT 12 Yr+ IM 0.5 ML 09/21/2022    DTaP 07/04/2007, 07/23/2015, 08/17/2018    Hep B, adult 03/01/2018, 03/01/2018, 04/04/2018, 04/04/2018, 09/05/2018, 09/05/2018    INFLUENZA 11/15/2016    Influenza, seasonal, injectable 11/15/2016    Pneumococcal Polysaccharide PPV23 09/25/2018, 09/25/2018    Td (adult), Unspecified 08/17/2018    Tetanus, adsorbed 07/04/2007     Objective     /84 (BP Location: Left arm, Patient Position: Sitting, Cuff Size: Standard)   Pulse (!) 112   Temp 98.3 °F (36.8 °C)   Resp 18   Ht 5' 5.25\" (1.657 m)   Wt 73.9 kg (163 lb)   SpO2 97%   BMI 26.92 kg/m²     Physical Exam  Vitals reviewed.   Constitutional:      "  General: She is not in acute distress.     Appearance: Normal appearance.   HENT:      Head: Normocephalic and atraumatic.   Eyes:      General: No scleral icterus.     Conjunctiva/sclera: Conjunctivae normal.   Cardiovascular:      Rate and Rhythm: Regular rhythm. Tachycardia present.      Pulses: Normal pulses.      Heart sounds: Normal heart sounds. No murmur heard.     No friction rub. No gallop.   Pulmonary:      Effort: Pulmonary effort is normal.      Breath sounds: Normal breath sounds.   Abdominal:      General: Abdomen is flat.      Palpations: Abdomen is soft. There is mass.      Tenderness: There is no abdominal tenderness. There is no guarding.      Hernia: No hernia is present.   Musculoskeletal:      Cervical back: Normal range of motion.      Right lower leg: No edema.      Left lower leg: No edema.   Skin:     General: Skin is warm and dry.      Capillary Refill: Capillary refill takes less than 2 seconds.   Neurological:      General: No focal deficit present.      Mental Status: She is alert.   Psychiatric:         Mood and Affect: Mood normal.         Behavior: Behavior normal.       Administrative Statements

## 2024-05-22 ENCOUNTER — OFFICE VISIT (OUTPATIENT)
Dept: PSYCHIATRY | Facility: CLINIC | Age: 49
End: 2024-05-22

## 2024-05-22 DIAGNOSIS — F33.42 RECURRENT MAJOR DEPRESSIVE DISORDER, IN FULL REMISSION (HCC): Primary | ICD-10-CM

## 2024-05-22 DIAGNOSIS — F41.1 GENERALIZED ANXIETY DISORDER: ICD-10-CM

## 2024-05-22 PROBLEM — F13.99 SEDATIVE, HYPNOTIC OR ANXIOLYTIC USE, UNSPECIFIED WITH UNSPECIFIED SEDATIVE, HYPNOTIC OR ANXIOLYTIC-INDUCED DISORDER (HCC): Status: ACTIVE | Noted: 2024-05-22

## 2024-05-22 RX ORDER — HYDROXYZINE HYDROCHLORIDE 25 MG/1
25 TABLET, FILM COATED ORAL DAILY PRN
Qty: 30 TABLET | Refills: 1 | Status: SHIPPED | OUTPATIENT
Start: 2024-05-22

## 2024-05-22 NOTE — PSYCH
"MEDICATION MANAGEMENT NOTE        [facility]      Name and Date of Birth:  Celine Roa 49 y.o. 1975 MRN: [id number]    Date of Visit: 05/22/24       Visit Time    Visit Start Time: 1143  Visit Stop Time: 1217  Total Visit Duration:  34 minutes    Reason for Visit: Follow-up visit regarding medication management     Chief Complaint: \"I felt pretty good.\"    SUBJECTIVE:    Celine Roa is a 49 y.o., female, possessing a past psychiatric history significant for anxiety, medically complicated by mast cell activation syndrome, who was personally seen and evaluated at the [facility] outpatient clinic for follow-up regarding medication management.  It has been was discontinued at last appointment.    During interview today, Celine states that she is feeling \"good\".  States she is feeling healthier, feeling stronger, and has been exercising more.  States he has new job at [employer], has been going well.  She denies any worsening depressive symptoms, states she feels positive and future and goal oriented, is looking forward to the summer.  States she has some worries about her  who struggles with his own mental health issues, but he is improving.  States that she is happy her physical health is improving to the point that she was able to visit friends and [place].  States that trip went well, and she feels that with this new job, she will be getting out of the house more and gaining strength.  She states they have been very accommodating for her allergies.  States she is little fearful of the summer with her corn allergy, but is going be following up with her allergist today.  States she has reduced some of her medications, is no longer taking prednisone.  States she will be following up with neurology after the summer as well to monitor for possible MS.  Overall, she states she is sleeping fairly well, still has some restlessness at " times but is getting solid sleep later in the night.  States she is worried about her weight, but is trying to improve this.  States she think she may be going through menopause.      Current Rating Scores:   None completed today.    Psychiatric Review Of Systems:    Appetite: no, no change  Adverse eating: no  Weight changes: no  Insomnia/sleeplessness: no  Fatigue/anergy: no  Anhedonia/lack of interest: no  Attention/concentration: no  Psychomotor agitation/retardation: no  Somatic symptoms: no, improved  Anxiety/panic attack: worrying improving  Kendal/hypomania: no  Hopelessness/helplessness/worthlessness: feeling better  Self-injurious behavior/high-risk behavior: no  Suicidal ideation: no  Homicidal ideation: no  Auditory hallucinations: no  Visual hallucinations: no  Other perceptual disturbances: no  Delusional thinking: no  Obsessive/compulsive symptoms: no    Review Of Systems:      Constitutional negative   ENT negative   Cardiovascular negative   Respiratory negative   Gastrointestinal negative   Genitourinary negative   Musculoskeletal negative   Integumentary negative   Neurological negative   Endocrine negative   Other Symptoms none, all other systems are negative     History Review:   The following portions of the patient's history were reviewed and updated as appropriate: allergies, current medications, past family history, past medical history, past social history, past surgical history and problem list..         OBJECTIVE:     Vital signs in last 24 hours:    There were no vitals filed for this visit.    Mental Status Evaluation:    Appearance age appropriate, casually dressed   Behavior cooperative, less anxious   Speech normal rate, normal volume, normal pitch   Mood improved, euthymic   Affect normal range and intensity, appropriate   Thought Processes organized, goal directed   Associations intact associations   Thought Content no overt delusions   Perceptual Disturbances: no auditory  hallucinations, no visual hallucinations   Abnormal Thoughts  Risk Potential Suicidal ideation - None, contracts for safety, would not harm self, would got to Emergency Room if feeling unsafe  Homicidal ideation - None  Potential for aggression - No   Orientation oriented to person, place, time/date, and situation   Memory recent and remote memory grossly intact   Consciousness alert and awake   Attention Span Concentration Span attention span and concentration are age appropriate   Intellect appears to be of average intelligence   Insight intact   Judgement intact   Muscle Strength and  Gait normal muscle strength and normal muscle tone, normal gait and normal balance   Motor activity no abnormal movements   Fund of Knowledge adequate knowledge of current events  adequate fund of knowledge regarding past history  adequate fund of knowledge regarding vocabulary    Pain none   Pain Scale 0       Laboratory Results: I have personally reviewed all pertinent laboratory/tests results    Recent Labs (last 2 months):   Appointment on 05/14/2024   Component Date Value    CRP 05/14/2024 7.2 (H)     WBC 05/14/2024 6.06     RBC 05/14/2024 4.43     Hemoglobin 05/14/2024 14.4     Hematocrit 05/14/2024 43.8     MCV 05/14/2024 99 (H)     MCH 05/14/2024 32.5     MCHC 05/14/2024 32.9     RDW 05/14/2024 12.9     MPV 05/14/2024 9.1     Platelets 05/14/2024 402 (H)     nRBC 05/14/2024 0     Segmented % 05/14/2024 53     Immature Grans % 05/14/2024 0     Lymphocytes % 05/14/2024 32     Monocytes % 05/14/2024 10     Eosinophils Relative 05/14/2024 3     Basophils Relative 05/14/2024 2 (H)     Absolute Neutrophils 05/14/2024 3.20     Absolute Immature Grans 05/14/2024 0.01     Absolute Lymphocytes 05/14/2024 1.93     Absolute Monocytes 05/14/2024 0.63     Eosinophils Absolute 05/14/2024 0.19     Basophils Absolute 05/14/2024 0.10     Sodium 05/14/2024 139     Potassium 05/14/2024 4.1     Chloride 05/14/2024 108     CO2 05/14/2024 20 (L)      ANION GAP 05/14/2024 11     BUN 05/14/2024 14     Creatinine 05/14/2024 0.65     Glucose 05/14/2024 83     Calcium 05/14/2024 8.7     AST 05/14/2024 20     ALT 05/14/2024 24     Alkaline Phosphatase 05/14/2024 90     Total Protein 05/14/2024 6.7     Albumin 05/14/2024 3.9     Total Bilirubin 05/14/2024 0.24     eGFR 05/14/2024 103     TSH 3RD GENERATON 05/14/2024 1.006     Free T4 05/14/2024 0.65     Vitamin C 05/14/2024 0.7     Vit D, 25-Hydroxy 05/14/2024 32.5     Folate 05/14/2024 19.1     Carotene 05/14/2024 11     Copper 05/14/2024 84     Iron Saturation 05/14/2024 28     TIBC 05/14/2024 295     Iron 05/14/2024 83     UIBC 05/14/2024 212     Ferritin 05/14/2024 179        Suicide/Homicide Risk Assessment:    The following interventions are recommended: contracts for safety at present - agrees to go to ED if feeling unsafe, contracts for safety at present - agrees to call Crisis Intervention Service if feeling unsafe. Although patient's acute lethality risk is low, long-term/chronic lethality risk is mildly elevated in the presence of depression. At the current moment, Celine is future-oriented, forward-thinking, and demonstrates ability to act in a self-preserving manner as evidenced by volitionally presenting to the clinic today, seeking treatment. To mitigate future risk, patient should adhere to the recommendations below, avoid alcohol/illicit substance use, utilize community-based resources and familiar support and prioritize mental health treatment. Presently, patient denies active suicidal/homicidal ideation in addition to thoughts of self-injury; contracts for safety.  At conclusion of evaluation, patient is amenable to the recommendations below. Patient is amenable to calling/contacting the outpatient office including this writer if any acute adverse effects of their medication regimen arise in addition to any comments or concerns pertaining to their psychiatric management.  Patient is amenable to  calling/contacting crisis and/or attending to the nearest emergency department if their clinical condition deteriorates to assure their safety and stability, stating that they are able to appropriately confide in their  regarding their psychiatric state.    Assessment/Plan:     Diagnoses and all orders for this visit:    Recurrent major depressive disorder, in full remission (HCC)    Generalized anxiety disorder    Other orders  -     hydrOXYzine HCL (ATARAX) 25 mg tablet; Take 1 tablet (25 mg total) by mouth daily as needed for itching       Celine is a 49-year-old female who has a history of depression and anxiety, mostly related to her medical issues.  She appears much improved, has not been depressed, and anxiety is reduced as well.  She does not appear to be in acute danger to herself or others at this time.    Treatment Recommendations/Precautions:    Depression continues in remission, we will follow-up in 4 months to monitor how her summer went and see how she is doing.  Could also consider wellbutrin as well.   Medication management with psychotherapy every 4 months  Continue psychotherapy with own therapist  Aware of 24 hour and weekend coverage for urgent situations accessed by calling [facility] main practice number  Patient advised to call 911 if feeling suicidal or homicidal before acting out on their thoughts and they expressed understanding.  Medications Risks/Benefits      Risks, Benefits And Possible Side Effects Of Medications:    Risks, benefits, and possible side effects of medications explained to Celine and she verbalizes understanding and agreement for treatment. including: Risks and potential side effects of medication discussed with patient including serotonin syndrome, SIADH, worsening depression, suicidality, induction of concetta, GI upset, headaches, activation, sexual side effects, sedation, potential drug interactions, and others. Patient expressed understanding.    .     Controlled Medication Discussion:     Not applicable    Psychotherapy Provided:     Individual psychotherapy provided: Yes  Counseling was provided during the session today for 17 minutes.  Medication education provided to Celine.  Recent stressors discussed with Celine including family conflict, everyday stressors, chronic anxiety, and difficulty with maintaining healthy weight.  Coping strategies including eliminating avoidance, exercising, increasing social interaction, and keeping busy at work reviewed with Celine.   Cognitive therapy was utilized during the session.           Treatment Plan:    Completed and signed during the session: Not applicable - Treatment Plan not due at this session    This note was not shared with the patient due to this is a psychotherapy note      Diogenes Lewis,  05/22/24

## 2024-05-23 LAB — VIT A SERPL-MCNC: 42.3 UG/DL (ref 20.1–62)

## 2024-05-25 LAB
A-TOCOPHEROL VIT E SERPL-MCNC: 15.5 MG/L (ref 7–25.1)
GAMMA TOCOPHEROL SERPL-MCNC: 2.6 MG/L (ref 0.5–5.5)

## 2024-05-27 NOTE — PROGRESS NOTES
Celine Roa  tolerated treatment well with no complications.      Celine Roa is aware of future appt on 1/2/24 at 1130.     AVS printed and given to Celine Roa:   No (Declined by Celine Roa) x      NAME:  Darren Duncan  YOB: 1957  MEDICAL RECORD NUMBER:  [id number]    Shift Summary: : D/C NaHCO3 gtt, Trophic feeding started at 15:00, no . PICC line was inserted, to keep RIJ CVC for now as per Dr Duran. After removing the RIJ CVC in the future, Xray needs to be obtained to ensure PICC tip did not get malpositioned as per PICC Line Nurse. TPN was started at 18:00. More awake but no agitation or restlessness noted.    Family updated: Yes:  Family visited    Rhythm: Normal Sinus Rhythm     Most recent vitals:   Visit Vitals  /62   Pulse 80   Temp 98.9 °F (37.2 °C) (Esophageal)   Resp 21   Ht 1.735 m (5' 8.31\")   Wt 71.2 kg (156 lb 15.5 oz)   SpO2 100%   BMI 23.65 kg/m²      ABP (Arterial line BP): 159/49  ABP Mean (Arterial Line Mean): 79 mmHg    No data found.    Patient Vitals for the past 12 hrs:   CVP (Mean)   05/27/24 1800 3 mmHg   05/27/24 1700 1 mmHg   05/27/24 1600 13 mmHg   05/27/24 1500 8 mmHg   05/27/24 1400 8 mmHg   05/27/24 1300 4 mmHg   05/27/24 1200 6 mmHg   05/27/24 1100 0 mmHg   05/27/24 1000 0 mmHg   05/27/24 0900 0 mmHg   05/27/24 0800 0 mmHg   05/27/24 0700 0 mmHg         Respiratory support needed (if any):  - Ventilator Settings:  Vent Days 5    Vt (Set, mL): 0 mL  Resp Rate (Set): 7 bpm  FiO2 : 30 %    PEEP/CPAP (cmH2O): 5       Admission weight Weight - Scale: 49.6 kg (109 lb 5.6 oz) (05/16/24 0701)    Today's weight    Wt Readings from Last 1 Encounters:   05/27/24 71.2 kg (156 lb 15.5 oz)        King need assessed each shift: YES -  - continue king r/t -    UOP >30ml/hr: NO - 20-30ml/hr  Last documented BM (in last 48 hrs):  Patient Vitals for the past 48 hrs:   Last BM (including prior to admit)   05/25/24 2000 05/25/24 05/26/24 0200 05/26/24 05/26/24 0800 05/26/24 05/26/24 1000 05/26/24 05/26/24 1200 05/26/24 05/26/24 1600 05/26/24 05/26/24 2000 05/26/24 05/27/24 0400 05/27/24 05/27/24 0800 05/27/24                Restraints (in use currently  assessment. LDA wound assessment was Initiated and completed by RN    Wound Care Orders initiated by RN: Yes       Thomas Prevention initiated by RN: Yes    Pressure Injury (Stage 3,4, Unstageable, DTI, NWPT, and Complex wounds) if present, place Wound referral order by RN under : Yes    New Ostomies, if present place, Ostomy referral order under : No     Nurse 1 eSignature: Electronically signed by Patricia Mckeon RN on 5/27/24 at 6:54 PM EDT    **SHARE this note so that the co-signing nurse can place an eSignature**    Nurse 2 eSignature: Electronically signed by Matthew Hassan RN on 5/27/24 at 7:37 PM EDT

## 2024-05-28 ENCOUNTER — HOSPITAL ENCOUNTER (OUTPATIENT)
Dept: INFUSION CENTER | Facility: HOSPITAL | Age: 49
Discharge: HOME | End: 2024-05-28
Attending: INTERNAL MEDICINE

## 2024-06-05 ENCOUNTER — OFFICE VISIT (OUTPATIENT)
Dept: GASTROENTEROLOGY | Facility: CLINIC | Age: 49
End: 2024-06-05
Payer: COMMERCIAL

## 2024-06-05 VITALS — WEIGHT: 165 LBS | HEIGHT: 65 IN | BODY MASS INDEX: 27.49 KG/M2

## 2024-06-05 DIAGNOSIS — D47.3 ESSENTIAL (HEMORRHAGIC) THROMBOCYTHEMIA (HCC): ICD-10-CM

## 2024-06-05 DIAGNOSIS — G44.89 OTHER HEADACHE SYNDROME: ICD-10-CM

## 2024-06-05 DIAGNOSIS — D89.40 MAST CELL ACTIVATION SYNDROME (HCC): ICD-10-CM

## 2024-06-05 DIAGNOSIS — D84.9 IMMUNOSUPPRESSED STATUS (HCC): ICD-10-CM

## 2024-06-05 DIAGNOSIS — E44.1 MILD PROTEIN-CALORIE MALNUTRITION (HCC): ICD-10-CM

## 2024-06-05 DIAGNOSIS — K50.10 CROHN'S DISEASE OF COLON WITHOUT COMPLICATION (HCC): Primary | ICD-10-CM

## 2024-06-05 PROCEDURE — 99214 OFFICE O/P EST MOD 30 MIN: CPT | Performed by: INTERNAL MEDICINE

## 2024-06-05 RX ORDER — FUROSEMIDE 20 MG/1
TABLET ORAL
COMMUNITY
Start: 2024-05-06

## 2024-06-05 RX ORDER — FLUCONAZOLE 100 MG/1
TABLET ORAL
COMMUNITY
Start: 2024-05-31

## 2024-06-05 NOTE — PROGRESS NOTES
[facility] - Outpatient Follow-up Note  Ceilne Roa 49 y.o. female MRN: [id number]  Encounter: [id number]          ASSESSMENT AND PLAN:    Celine Roa is a 49 y.o. female with hereditary alpha tryptase EMEA, Hashimoto's, IBD likely Crohn's, diagnosis of mast cell activation syndrome from [facility], anti-TNF induced lupus, previously on Stelara most recently on Skyrizi but currently off medication presenting for follow-up. Overall doing well.     Endoscopy and colonoscopy May 2023 with gastric erosion, colonic granular mucosa, biopsies with chronic active colitis.   September 2023 flex with some abnormal mucosa and relatively benign colon biopsies.  ERCP February 2024 with antral erosion and prior sphincterotomy with balloon sweeps notable for slight debris but no filling defects.  Echo with EF of 60%.  MRI/MRCP with stable chronic intra and extrahepatic biliary ductal dilation.  CT with no acute findings in the abdomen or pelvis.    Most recent CMP normal.  Iron studies normal.  Vitamin D normal.  CBC with elevated platelets and MCV but otherwise normal.  TSH normal.  CRP slightly elevated at 7.2.    1. Crohn's disease of colon without complication (HCC)    2. Immunosuppressed status (HCC)    3. 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HCC)    4. Mild protein-calorie malnutrition (HCC)    5. Other headache syndrome    6. Essential (hemorrhagic) thrombocythemia (HCC)        Orders Placed This Encounter   Procedures    Calprotectin,Fecal    C-reactive protein    CBC and differential    Comprehensive metabolic panel     Continue to hold off on Stelara for now but can consider restarting in the future  Next blood work and stool test in 6 months  Continue to follow-up with [facility]  Quant gold hepatitis panel December 2024  Continue Pepcid  Routine vaccines  Routine Pap smears  Routine mammograms  Routine skin exams with a  dermatologist  ______________________________________________________________________    SUBJECTIVE:    Celine Roa is a 49 y.o. female who presents with complaint of crohn's.     She has been doing well.   She has been working again. She has issues after eating watermelon, tomatoes and cucumbers. They are short-lived. Not a daily issue. No urgency or accidents. No pain. She is on hyoscyamine. She is on pepcid as well. She will take IBGard as needed. If she has multiple Bms she will take a budesonide before she eats the next time. She is off prednisone and biologics. Her arthritis seems to be flaring. She is trying to take Tylenol as needed. She takes motrin. She takes a prednisone at times if needed and the medicine is not working. Pain in ankles and legs. MCAS has been calm. She gets through her day. She takes benadryl and singulair before bedtime. She gets hydration treatments. Last 3 weeks ago with Xolair. She is doing magnesium, potassium, B2, B12, vitamin D, zinc. Thyroid function is improving. Bowels not as formed as they were. No pains even with a BM. Headaches were an issue    REVIEW OF SYSTEMS IS OTHERWISE NEGATIVE.  10 point ROS reviewed and negative, except as above      Historical Information   Past Medical History:   Diagnosis Date    Anemia 2007    Anxiety     Asthma     Bile duct stricture 2014    Sphincter of oddi dysfunction    Chronic kidney disease     Closed bimalleolar fracture 02/22/2024    Colon polyp 1998    Crohn's colitis (HCC)     Cyst of ovary 02/22/2024    Deep vein thrombosis (HCC)     Disease of thyroid gland     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 pt denies,  documented in cardio note    Pain of right thigh 02/13/2023    Pancreatitis     sphinger of     Psychiatric disorder     RA (rheumatoid arthritis) (HCC)     Seizures (HCC)     Sepsis without acute organ dysfunction (HCC)     SIRS (systemic inflammatory response syndrome) (HCC)     Thrombocytosis 04/05/2022    Ulcerative colitis (HCC)     Visual impairment      Past Surgical History:   Procedure Laterality Date    ABDOMINAL SURGERY  2017    APPENDECTOMY      CHOLECYSTECTOMY      COLONOSCOPY  2019    CYSTOSCOPY N/A 06/08/2023    Procedure: CYSTOSCOPY, mini TURBT with fulgeration;  Surgeon: Tee Bruno MD;  Location: [facility];  Service: Urology    EGD AND COLONOSCOPY N/A 09/12/2017    Procedure: EGD AND COLONOSCOPY;  Surgeon: Tommy Oliver MD;  Location:  GI LAB;  Service: Gastroenterology    ERCP N/A 09/15/2017    Procedure: ENDOSCOPIC RETROGRADE CHOLANGIOPANCREATOGRAPHY (ERCP);  Surgeon: Dre Soto MD;  Location:  GI LAB;  Service: Gastroenterology    ERCP  02/14/2024    HYSTERECTOMY      KNEE SURGERY Right     LAPAROSCOPIC ENDOMETRIOSIS FULGURATION      ORIF ANKLE FRACTURE Left     IL ARTHRS KNE SURG W/MENISCECTOMY MED/LAT W/SHVG Left 05/12/2017    Procedure: POSSIBLE MEDIAL MENISECTOMY;  Surgeon: Kristian Avila MD;  Location: [facility];  Service: Orthopedics    IL ARTHRS KNEE DEBRIDEMENT/SHAVING ARTCLR CRTLG Left 05/12/2017    Procedure: KNEE ARTHROSCOPY EVALUATION, CHONDROPLASTY;  Surgeon: Kristian Avila MD;  Location: [facility];  Service: Orthopedics    IL LAPS ABD PRTM&OMENTUM DX W/WO SPEC BR/WA SPX N/A 12/12/2017    Procedure: LAPAROSCOPY DIAGNOSTIC  WITH LYSIS OF ADHESIONS;  Surgeon: Olaf John DO;  Location:  MAIN OR;  Service: General    UPPER GASTROINTESTINAL ENDOSCOPY  2019     Social History   Social History     Substance and Sexual Activity   Alcohol Use Never     Social History     Substance and Sexual Activity   Drug Use Never     Social History     Tobacco Use   Smoking Status Never  "  Smokeless Tobacco Never     Family History   Problem Relation Age of Onset    Ulcerative colitis Mother     Arthritis Mother     Colon polyps Mother     Heart failure Father     Neuropathy Father     Macular degeneration Father     Diabetes Father     Early death Father     Vision loss Father     Asthma Daughter     Hypothyroidism Daughter     Heart disease Maternal Grandmother     Arthritis Maternal Grandmother     No Known Problems Maternal Grandfather     Arthritis Paternal Grandmother     Macular degeneration Paternal Grandmother     Vision loss Paternal Grandmother     Lymphoma Paternal Grandfather     Cancer Paternal Grandfather     Early death Paternal Grandfather     Breast cancer Maternal Aunt     Cancer Maternal Aunt     Miscarriages / Stillbirths Maternal Aunt     Heart failure Paternal Aunt     Heart failure Paternal Aunt     Irritable bowel syndrome Paternal Aunt     Breast cancer Paternal Aunt 54    Breast cancer additional onset Paternal Aunt 58    Hypothyroidism Paternal Aunt     Cancer Paternal Aunt     No Known Problems Son     No Known Problems Son     Kidney disease Brother     Depression Paternal Uncle     Early death Paternal Uncle        Meds/Allergies       Current Outpatient Medications:     Ascorbic Acid (vitamin C) 1000 MG tablet    B Complex Vitamins (VITAMIN B COMPLEX PO)    B-D 3CC LUER-SHAMEKA SYR 25GX1\" 25G X 1\" 3 ML MISC    butalbital-acetaminophen-caffeine (FIORICET,ESGIC) -40 mg per tablet    cetirizine (ZyrTEC) 10 mg tablet    cholecalciferol (VITAMIN D3) 1,000 units tablet    cyanocobalamin (VITAMIN B-12) 100 mcg tablet    diphenhydrAMINE (BENADRYL) 50 mg capsule    ergocalciferol (VITAMIN D2) 50,000 units    famotidine (PEPCID) 40 MG tablet    fluconazole (DIFLUCAN) 100 mg tablet    folic acid (KP Folic Acid) 1 mg tablet    furosemide (LASIX) 20 mg tablet    hydrOXYzine HCL (ATARAX) 25 mg tablet    HYDROXYZINE HCL PO    hyoscyamine (ANASPAZ,LEVSIN) 0.125 MG tablet    " "Lactobacillus (PROBIOTIC ACIDOPHILUS PO)    Lactobacillus-Inulin ([employer]) CAPS    levalbuterol (XOPENEX HFA) 45 mcg/act inhaler    levothyroxine 50 mcg tablet    liothyronine (CYTOMEL) 5 mcg tablet    magnesium gluconate (MAGONATE) 500 mg tablet    Melatonin 3 MG SUBL    Methotrexate 25 MG/ML SOSY    methotrexate 50 MG/2ML injection    mometasone (ELOCON) 0.1 % cream    montelukast (SINGULAIR) 10 mg tablet    NEEDLE, DISP, 27 G (B-D DISP NEEDLE 12EO1-4/4\") 27G X 1-1/4\" MISC    Omalizumab 300 MG/2  ML SOSY    PEPPERMINT OIL PO    predniSONE 20 mg tablet    Probiotic Product (CULTURELLE ABDOMINAL SUPPORT PO)    Riboflavin (Vitamin B-2) 100 MG TABS    risankizumab-rzaa (Skyrizi) 360 MG/2.4ML SOCT    TechLite Lancets MISC    leuprolide 1 mg/0.2 mL    Allergies   Allergen Reactions    Aimovig [Erenumab-Aooe] Anaphylaxis    Amitriptyline Anaphylaxis     According to the patient she had nphylaxis    Aspergillus Allergy Skin Test Other (See Comments), Hives and Swelling    Azathioprine Other (See Comments) and Anaphylaxis     pancreatitis  According to patient she needed Epipen    Buspar [Buspirone] Hives and Shortness Of Breath    Citric Acid-Polysorbate 80 Abdominal Pain, Anaphylaxis, Anxiety, Bradycardia, Diarrhea, Fatigue, GI Intolerance, Headache, Hives, Hyperactivity, Itching, Lip Swelling, Nausea Only, Palpitations, Rash, Shortness Of Breath, Tachycardia, Throat Swelling, Tongue Swelling and Vomiting    Corn Dextrin Anaphylaxis     Any corn based products    Erenumab Anaphylaxis     patient sent portal message stating she had anaphylaxis  patient sent portal message stating she had anaphylaxis      Gadolinium Abdominal Pain, Anaphylaxis, Anxiety, Confusion, Delirium, Dizziness, Eye Swelling, Fatigue, GI Intolerance, Headache, Hives, Irritability, Itching, Lip Swelling, Nausea Only, Palpitations, Photosensitivity, Rash, Seizures, Shortness Of Breath, Swelling, Syncope, Throat Swelling, Tongue " "Swelling, Tremor, Visual Disturbance and Vomiting    Gelatin - Food Allergy Anaphylaxis    Heparin Hives     Painful welts     Lavender Oil Anaphylaxis     Other reaction(s): anaphalxis    Malto Dextrin [Dextrin] Anaphylaxis    Penicillium Notatum Shortness Of Breath and Swelling    Red Dye - Food Allergy Anaphylaxis and Other (See Comments)     intolerance    Sumatriptan Anaphylaxis     Bradycardia, sob, back pressure, head pain,throat tightness  According to the patient she had anphylaxis    Ustekinumab (Murine) Anaphylaxis    Lidocaine Hives, Itching and Rash    Contrast [Iodinated Contrast Media] Other (See Comments)     Pt states needs to be premedicated prior to injection    Corn Oil - Food Allergy - Food Allergy Hives    Humira [Adalimumab] Other (See Comments)     \"I develop drug induced lupus\"    Ibuprofen Hives and Throat Swelling    Polyethylene Glycol Diarrhea and Vomiting    Remicade [Infliximab] Other (See Comments)     \"I develop drug induced lupus\"    Sulfa Antibiotics Hives and Other (See Comments)    Sulfate Hives    Sulfites - Food Allergy Hives    Sweet Corn Extract Skin Test - Food Allergy Hives and Itching    Chlorhexidine Itching    Freederm Adhesive Remover [New Skin] Rash    Histamine Hives, Rash and Other (See Comments)     Intolerance      Wound Dressings Rash           Objective     Height 5' 5\" (1.651 m), weight 74.8 kg (165 lb), not currently breastfeeding. Body mass index is 27.46 kg/m².      PHYSICAL EXAMINATION:    General Appearance:   Alert, cooperative, no distress   HEENT:  Normocephalic, atraumatic, anicteric. Neck supple, symmetrical, trachea midline.   Lungs:   Equal chest rise and unlabored breathing, normal effort, no coughing.   Cardiovascular:   No visualized JVD.   Abdomen:   No abdominal distension.   Skin:   No jaundice, rashes, or lesions.    Musculoskeletal:   Normal range of motion visualized.   Psych:  Normal affect and normal insight.   Neuro:  Alert and " appropriate.         Lab Results:   No visits with results within 1 Day(s) from this visit.   Latest known visit with results is:   Appointment on 05/14/2024   Component Date Value    CRP 05/14/2024 7.2 (H)     WBC 05/14/2024 6.06     RBC 05/14/2024 4.43     Hemoglobin 05/14/2024 14.4     Hematocrit 05/14/2024 43.8     MCV 05/14/2024 99 (H)     MCH 05/14/2024 32.5     MCHC 05/14/2024 32.9     RDW 05/14/2024 12.9     MPV 05/14/2024 9.1     Platelets 05/14/2024 402 (H)     nRBC 05/14/2024 0     Segmented % 05/14/2024 53     Immature Grans % 05/14/2024 0     Lymphocytes % 05/14/2024 32     Monocytes % 05/14/2024 10     Eosinophils Relative 05/14/2024 3     Basophils Relative 05/14/2024 2 (H)     Absolute Neutrophils 05/14/2024 3.20     Absolute Immature Grans 05/14/2024 0.01     Absolute Lymphocytes 05/14/2024 1.93     Absolute Monocytes 05/14/2024 0.63     Eosinophils Absolute 05/14/2024 0.19     Basophils Absolute 05/14/2024 0.10     Sodium 05/14/2024 139     Potassium 05/14/2024 4.1     Chloride 05/14/2024 108     CO2 05/14/2024 20 (L)     ANION GAP 05/14/2024 11     BUN 05/14/2024 14     Creatinine 05/14/2024 0.65     Glucose 05/14/2024 83     Calcium 05/14/2024 8.7     AST 05/14/2024 20     ALT 05/14/2024 24     Alkaline Phosphatase 05/14/2024 90     Total Protein 05/14/2024 6.7     Albumin 05/14/2024 3.9     Total Bilirubin 05/14/2024 0.24     eGFR 05/14/2024 103     TSH 3RD GENERATON 05/14/2024 1.006     Free T4 05/14/2024 0.65     Vitamin C 05/14/2024 0.7     Vit D, 25-Hydroxy 05/14/2024 32.5     Vitamin E(Alpha Tocopher* 05/14/2024 15.5     Vitamin E(Gamma Tocopher* 05/14/2024 2.6     Folate 05/14/2024 19.1     Vitamin A 05/14/2024 42.3     Carotene 05/14/2024 11     Copper 05/14/2024 84     Iron Saturation 05/14/2024 28     TIBC 05/14/2024 295     Iron 05/14/2024 83     UIBC 05/14/2024 212     Ferritin 05/14/2024 179        Lab Results   Component Value Date    WBC 6.06 05/14/2024    HGB 14.4 05/14/2024     HCT 43.8 05/14/2024    MCV 99 (H) 05/14/2024     (H) 05/14/2024       Lab Results   Component Value Date     01/16/2015    SODIUM 139 05/14/2024    K 4.1 05/14/2024     05/14/2024    CO2 20 (L) 05/14/2024    ANIONGAP 12 01/16/2015    AGAP 11 05/14/2024    BUN 14 05/14/2024    CREATININE 0.65 05/14/2024    GLUC 83 05/14/2024    GLUF 71 02/09/2024    CALCIUM 8.7 05/14/2024    AST 20 05/14/2024    ALT 24 05/14/2024    ALKPHOS 90 05/14/2024    PROT 7.7 01/16/2015    TP 6.7 05/14/2024    BILITOT 0.7 01/16/2015    TBILI 0.24 05/14/2024    EGFR 103 05/14/2024       Lab Results   Component Value Date    CRP 7.2 (H) 05/14/2024       Lab Results   Component Value Date    YCB5XHNQMOMA 1.006 05/14/2024       Lab Results   Component Value Date    IRON 83 05/14/2024    TIBC 295 05/14/2024    FERRITIN 179 05/14/2024       Radiology Results:   No results found.

## 2024-06-11 ENCOUNTER — HOSPITAL ENCOUNTER (OUTPATIENT)
Dept: INFUSION CENTER | Facility: HOSPITAL | Age: 49
Discharge: HOME | End: 2024-06-11
Payer: COMMERCIAL

## 2024-06-11 ENCOUNTER — APPOINTMENT (OUTPATIENT)
Dept: LAB | Facility: HOSPITAL | Age: 49
End: 2024-06-11
Payer: COMMERCIAL

## 2024-06-11 DIAGNOSIS — K50.10 CROHN'S DISEASE OF COLON WITHOUT COMPLICATION (HCC): ICD-10-CM

## 2024-06-11 DIAGNOSIS — L50.1 CHRONIC IDIOPATHIC URTICARIA: Primary | ICD-10-CM

## 2024-06-11 LAB
ALBUMIN SERPL BCG-MCNC: 4.4 G/DL (ref 3.5–5)
ALP SERPL-CCNC: 70 U/L (ref 34–104)
ALT SERPL W P-5'-P-CCNC: 16 U/L (ref 7–52)
ANION GAP SERPL CALCULATED.3IONS-SCNC: 12 MMOL/L (ref 4–13)
AST SERPL W P-5'-P-CCNC: 14 U/L (ref 13–39)
BASOPHILS # BLD AUTO: 0.06 THOUSANDS/ÂΜL (ref 0–0.1)
BASOPHILS NFR BLD AUTO: 1 % (ref 0–1)
BILIRUB SERPL-MCNC: 0.49 MG/DL (ref 0.2–1)
BUN SERPL-MCNC: 12 MG/DL (ref 5–25)
CALCIUM SERPL-MCNC: 9.8 MG/DL (ref 8.4–10.2)
CHLORIDE SERPL-SCNC: 104 MMOL/L (ref 96–108)
CO2 SERPL-SCNC: 19 MMOL/L (ref 21–32)
CREAT SERPL-MCNC: 0.7 MG/DL (ref 0.6–1.3)
CRP SERPL QL: 4.2 MG/L
EOSINOPHIL # BLD AUTO: 0 THOUSAND/ÂΜL (ref 0–0.61)
EOSINOPHIL NFR BLD AUTO: 0 % (ref 0–6)
ERYTHROCYTE [DISTWIDTH] IN BLOOD BY AUTOMATED COUNT: 12.5 % (ref 11.6–15.1)
GFR SERPL CREATININE-BSD FRML MDRD: 100 ML/MIN/1.73SQ M
GLUCOSE SERPL-MCNC: 187 MG/DL (ref 65–140)
HCT VFR BLD AUTO: 47.2 % (ref 34.8–46.1)
HGB BLD-MCNC: 15.7 G/DL (ref 11.5–15.4)
IMM GRANULOCYTES # BLD AUTO: 0.03 THOUSAND/UL (ref 0–0.2)
IMM GRANULOCYTES NFR BLD AUTO: 0 % (ref 0–2)
LYMPHOCYTES # BLD AUTO: 0.83 THOUSANDS/ÂΜL (ref 0.6–4.47)
LYMPHOCYTES NFR BLD AUTO: 11 % (ref 14–44)
MCH RBC QN AUTO: 31.7 PG (ref 26.8–34.3)
MCHC RBC AUTO-ENTMCNC: 33.3 G/DL (ref 31.4–37.4)
MCV RBC AUTO: 95 FL (ref 82–98)
MONOCYTES # BLD AUTO: 0.13 THOUSAND/ÂΜL (ref 0.17–1.22)
MONOCYTES NFR BLD AUTO: 2 % (ref 4–12)
NEUTROPHILS # BLD AUTO: 6.78 THOUSANDS/ÂΜL (ref 1.85–7.62)
NEUTS SEG NFR BLD AUTO: 86 % (ref 43–75)
NRBC BLD AUTO-RTO: 0 /100 WBCS
PLATELET # BLD AUTO: 443 THOUSANDS/UL (ref 149–390)
PMV BLD AUTO: 9.1 FL (ref 8.9–12.7)
POTASSIUM SERPL-SCNC: 4 MMOL/L (ref 3.5–5.3)
PROT SERPL-MCNC: 7.5 G/DL (ref 6.4–8.4)
RBC # BLD AUTO: 4.96 MILLION/UL (ref 3.81–5.12)
SODIUM SERPL-SCNC: 135 MMOL/L (ref 135–147)
WBC # BLD AUTO: 7.83 THOUSAND/UL (ref 4.31–10.16)

## 2024-06-11 PROCEDURE — 85025 COMPLETE CBC W/AUTO DIFF WBC: CPT

## 2024-06-11 PROCEDURE — 86140 C-REACTIVE PROTEIN: CPT

## 2024-06-11 PROCEDURE — 36415 COLL VENOUS BLD VENIPUNCTURE: CPT

## 2024-06-11 PROCEDURE — 96372 THER/PROPH/DIAG INJ SC/IM: CPT

## 2024-06-11 PROCEDURE — 80053 COMPREHEN METABOLIC PANEL: CPT

## 2024-06-11 RX ORDER — EPINEPHRINE 1 MG/ML
0.3 INJECTION, SOLUTION, CONCENTRATE INTRAVENOUS
OUTPATIENT
Start: 2024-07-09

## 2024-06-11 RX ORDER — EPINEPHRINE 1 MG/ML
0.3 INJECTION, SOLUTION, CONCENTRATE INTRAVENOUS
Status: DISCONTINUED | OUTPATIENT
Start: 2024-06-11 | End: 2024-06-14 | Stop reason: HOSPADM

## 2024-06-11 RX ADMIN — OMALIZUMAB 300 MG: 150 INJECTION, SOLUTION SUBCUTANEOUS at 11:53

## 2024-06-11 NOTE — PROGRESS NOTES
Pt presents today for Xolair injections. Pt offers no complaints. Pt epi pen expiration 02/25 at chairside. Pt tolerated Xolair injections, x1 OWEN and x1 LATHA well without complications.     Celine Roa is aware of future appt on 7/9/24 at 9AM.     AVS declined by Celine Roa.    Pt discharged off unit in stable condition with all personal belongings.

## 2024-06-11 NOTE — PLAN OF CARE
Problem: INFECTION - ADULT  Goal: Absence or prevention of progression during hospitalization  Description: INTERVENTIONS:  - Assess and monitor for signs and symptoms of infection  - Monitor lab/diagnostic results  - Monitor all insertion sites, i.e. indwelling lines, tubes, and drains  - Monitor endotracheal if appropriate and nasal secretions for changes in amount and color  - [place] appropriate cooling/warming therapies per order  - Administer medications as ordered  - Instruct and encourage patient and family to use good hand hygiene technique  - Identify and instruct in appropriate isolation precautions for identified infection/condition  Outcome: Progressing     Problem: Knowledge Deficit  Goal: Patient/family/caregiver demonstrates understanding of disease process, treatment plan, medications, and discharge instructions  Description: Complete learning assessment and assess knowledge base.  Interventions:  - Provide teaching at level of understanding  - Provide teaching via preferred learning methods  Outcome: Progressing

## 2024-07-01 ENCOUNTER — TELEPHONE (OUTPATIENT)
Age: 49
End: 2024-07-01

## 2024-07-01 DIAGNOSIS — K50.10 CROHN'S DISEASE OF COLON WITHOUT COMPLICATION (HCC): Primary | ICD-10-CM

## 2024-07-01 NOTE — TELEPHONE ENCOUNTER
[facility] called, requesting a refill of Budesonide on patient's behalf.  Unable to locate as an active medication. If approved please sent to [facility] - [facility] - [address]

## 2024-07-02 RX ORDER — BUDESONIDE 3 MG/1
3 CAPSULE, COATED PELLETS ORAL DAILY
Qty: 90 CAPSULE | Refills: 3 | Status: SHIPPED | OUTPATIENT
Start: 2024-07-02 | End: 2024-07-03 | Stop reason: SDUPTHER

## 2024-07-03 DIAGNOSIS — K50.10 CROHN'S DISEASE OF COLON WITHOUT COMPLICATION (HCC): ICD-10-CM

## 2024-07-03 RX ORDER — BUDESONIDE 3 MG/1
9 CAPSULE, COATED PELLETS ORAL DAILY
Qty: 90 CAPSULE | Refills: 3 | Status: SHIPPED | OUTPATIENT
Start: 2024-07-03

## 2024-07-09 ENCOUNTER — APPOINTMENT (OUTPATIENT)
Dept: LAB | Facility: HOSPITAL | Age: 49
End: 2024-07-09
Payer: COMMERCIAL

## 2024-07-09 ENCOUNTER — HOSPITAL ENCOUNTER (OUTPATIENT)
Dept: INFUSION CENTER | Facility: HOSPITAL | Age: 49
Discharge: HOME | End: 2024-07-09
Payer: COMMERCIAL

## 2024-07-09 VITALS
OXYGEN SATURATION: 100 % | TEMPERATURE: 98.7 F | RESPIRATION RATE: 18 BRPM | HEART RATE: 76 BPM | SYSTOLIC BLOOD PRESSURE: 112 MMHG | DIASTOLIC BLOOD PRESSURE: 74 MMHG

## 2024-07-09 DIAGNOSIS — H53.8 DRUG-INDUCED DISORDER OF REFRACTION AND ACCOMMODATION: ICD-10-CM

## 2024-07-09 DIAGNOSIS — L50.1 IDIOPATHIC URTICARIA: ICD-10-CM

## 2024-07-09 DIAGNOSIS — E61.1 IRON DEFICIENCY: ICD-10-CM

## 2024-07-09 DIAGNOSIS — D89.44 HEREDITARY ALPHA TRYPTASEMIA (HCC): ICD-10-CM

## 2024-07-09 DIAGNOSIS — T88.7XXA DRUG-INDUCED DISORDER OF REFRACTION AND ACCOMMODATION: ICD-10-CM

## 2024-07-09 DIAGNOSIS — L50.1 CHRONIC IDIOPATHIC URTICARIA: Primary | ICD-10-CM

## 2024-07-09 DIAGNOSIS — D89.9 DISEASE OF IMMUNE SYSTEM (HCC): ICD-10-CM

## 2024-07-09 DIAGNOSIS — D47.09 NEOPLASM OF UNCERTAIN BEHAVIOR OF HISTIOCYTIC AND MAST CELLS: ICD-10-CM

## 2024-07-09 DIAGNOSIS — E56.8 DEFICIENCY OF OTHER VITAMINS: ICD-10-CM

## 2024-07-09 LAB
25(OH)D3 SERPL-MCNC: 33.8 NG/ML (ref 30–100)
ALBUMIN SERPL BCG-MCNC: 4.4 G/DL (ref 3.5–5)
ALP SERPL-CCNC: 60 U/L (ref 34–104)
ALT SERPL W P-5'-P-CCNC: 16 U/L (ref 7–52)
ANION GAP SERPL CALCULATED.3IONS-SCNC: 12 MMOL/L (ref 4–13)
AST SERPL W P-5'-P-CCNC: 23 U/L (ref 13–39)
BASOPHILS # BLD AUTO: 0.15 THOUSANDS/ÂΜL (ref 0–0.1)
BASOPHILS NFR BLD AUTO: 2 % (ref 0–1)
BILIRUB SERPL-MCNC: 0.73 MG/DL (ref 0.2–1)
BUN SERPL-MCNC: 18 MG/DL (ref 5–25)
CALCIUM SERPL-MCNC: 9.6 MG/DL (ref 8.4–10.2)
CHLORIDE SERPL-SCNC: 101 MMOL/L (ref 96–108)
CO2 SERPL-SCNC: 23 MMOL/L (ref 21–32)
CREAT SERPL-MCNC: 0.73 MG/DL (ref 0.6–1.3)
EOSINOPHIL # BLD AUTO: 0.32 THOUSAND/ÂΜL (ref 0–0.61)
EOSINOPHIL NFR BLD AUTO: 3 % (ref 0–6)
ERYTHROCYTE [DISTWIDTH] IN BLOOD BY AUTOMATED COUNT: 12.9 % (ref 11.6–15.1)
GFR SERPL CREATININE-BSD FRML MDRD: 95 ML/MIN/1.73SQ M
GLUCOSE P FAST SERPL-MCNC: 71 MG/DL (ref 65–99)
HCT VFR BLD AUTO: 43.9 % (ref 34.8–46.1)
HGB BLD-MCNC: 15.1 G/DL (ref 11.5–15.4)
IMM GRANULOCYTES # BLD AUTO: 0.06 THOUSAND/UL (ref 0–0.2)
IMM GRANULOCYTES NFR BLD AUTO: 1 % (ref 0–2)
LYMPHOCYTES # BLD AUTO: 2.42 THOUSANDS/ÂΜL (ref 0.6–4.47)
LYMPHOCYTES NFR BLD AUTO: 24 % (ref 14–44)
MCH RBC QN AUTO: 32.3 PG (ref 26.8–34.3)
MCHC RBC AUTO-ENTMCNC: 34.4 G/DL (ref 31.4–37.4)
MCV RBC AUTO: 94 FL (ref 82–98)
MONOCYTES # BLD AUTO: 1.05 THOUSAND/ÂΜL (ref 0.17–1.22)
MONOCYTES NFR BLD AUTO: 11 % (ref 4–12)
NEUTROPHILS # BLD AUTO: 5.9 THOUSANDS/ÂΜL (ref 1.85–7.62)
NEUTS SEG NFR BLD AUTO: 59 % (ref 43–75)
NRBC BLD AUTO-RTO: 0 /100 WBCS
PLATELET # BLD AUTO: 371 THOUSANDS/UL (ref 149–390)
PMV BLD AUTO: 9.6 FL (ref 8.9–12.7)
POTASSIUM SERPL-SCNC: 4.7 MMOL/L (ref 3.5–5.3)
PROT SERPL-MCNC: 7.3 G/DL (ref 6.4–8.4)
RBC # BLD AUTO: 4.68 MILLION/UL (ref 3.81–5.12)
SODIUM SERPL-SCNC: 136 MMOL/L (ref 135–147)
WBC # BLD AUTO: 9.9 THOUSAND/UL (ref 4.31–10.16)

## 2024-07-09 PROCEDURE — 96372 THER/PROPH/DIAG INJ SC/IM: CPT

## 2024-07-09 PROCEDURE — 85025 COMPLETE CBC W/AUTO DIFF WBC: CPT

## 2024-07-09 PROCEDURE — 80053 COMPREHEN METABOLIC PANEL: CPT

## 2024-07-09 PROCEDURE — 36415 COLL VENOUS BLD VENIPUNCTURE: CPT

## 2024-07-09 PROCEDURE — 82306 VITAMIN D 25 HYDROXY: CPT

## 2024-07-09 RX ORDER — EPINEPHRINE 1 MG/ML
0.3 INJECTION, SOLUTION, CONCENTRATE INTRAVENOUS
Status: DISCONTINUED | OUTPATIENT
Start: 2024-07-09 | End: 2024-07-12 | Stop reason: HOSPADM

## 2024-07-09 RX ORDER — EPINEPHRINE 1 MG/ML
0.3 INJECTION, SOLUTION, CONCENTRATE INTRAVENOUS
OUTPATIENT
Start: 2024-08-06

## 2024-07-09 RX ADMIN — OMALIZUMAB 300 MG: 150 INJECTION, SOLUTION SUBCUTANEOUS at 09:31

## 2024-07-09 NOTE — PROGRESS NOTES
Celine Roa  tolerated treatment well with no complications.      Celine Roa is aware of future appt on 8/6/24 at 1030.     AVS printed and given to Celine Roa:   No (Declined by Celine Roa) x

## 2024-07-19 ENCOUNTER — HOSPITAL ENCOUNTER (OUTPATIENT)
Dept: INFUSION CENTER | Facility: HOSPITAL | Age: 49
End: 2024-07-19
Attending: INTERNAL MEDICINE
Payer: COMMERCIAL

## 2024-07-19 VITALS
SYSTOLIC BLOOD PRESSURE: 112 MMHG | HEART RATE: 80 BPM | DIASTOLIC BLOOD PRESSURE: 69 MMHG | OXYGEN SATURATION: 99 % | RESPIRATION RATE: 17 BRPM | TEMPERATURE: 97.2 F

## 2024-07-19 DIAGNOSIS — R11.10 VOMITING AND DIARRHEA: ICD-10-CM

## 2024-07-19 DIAGNOSIS — E44.1 MILD PROTEIN-CALORIE MALNUTRITION (HCC): ICD-10-CM

## 2024-07-19 DIAGNOSIS — R63.0 ANOREXIA: ICD-10-CM

## 2024-07-19 DIAGNOSIS — R19.7 VOMITING AND DIARRHEA: ICD-10-CM

## 2024-07-19 DIAGNOSIS — D50.0 IRON DEFICIENCY ANEMIA DUE TO CHRONIC BLOOD LOSS: Primary | ICD-10-CM

## 2024-07-19 DIAGNOSIS — E61.1 IRON DEFICIENCY: ICD-10-CM

## 2024-07-19 PROCEDURE — 96360 HYDRATION IV INFUSION INIT: CPT

## 2024-07-19 PROCEDURE — 96361 HYDRATE IV INFUSION ADD-ON: CPT

## 2024-07-19 RX ORDER — METHYLPREDNISOLONE SODIUM SUCCINATE 40 MG/ML
40 INJECTION, POWDER, LYOPHILIZED, FOR SOLUTION INTRAMUSCULAR; INTRAVENOUS ONCE
Status: DISCONTINUED | OUTPATIENT
Start: 2024-07-19 | End: 2024-07-22 | Stop reason: HOSPADM

## 2024-07-19 RX ORDER — METHYLPREDNISOLONE SODIUM SUCCINATE 40 MG/ML
40 INJECTION, POWDER, LYOPHILIZED, FOR SOLUTION INTRAMUSCULAR; INTRAVENOUS ONCE
Status: CANCELLED
Start: 2024-07-23 | End: 2024-07-20

## 2024-07-19 RX ADMIN — SODIUM CHLORIDE 1000 ML: 0.9 INJECTION, SOLUTION INTRAVENOUS at 09:15

## 2024-07-19 NOTE — PROGRESS NOTES
Celine Roa  tolerated treatment well with no complications.      Celine Roa is aware of future appt on 7/23/24 at 1.     AVS printed and given to Celine Roa:  No (Declined by Celine Roa) x

## 2024-07-23 ENCOUNTER — HOSPITAL ENCOUNTER (OUTPATIENT)
Dept: INFUSION CENTER | Facility: HOSPITAL | Age: 49
Discharge: HOME | End: 2024-07-23
Attending: INTERNAL MEDICINE

## 2024-07-23 VITALS
OXYGEN SATURATION: 100 % | TEMPERATURE: 96.9 F | RESPIRATION RATE: 17 BRPM | SYSTOLIC BLOOD PRESSURE: 127 MMHG | DIASTOLIC BLOOD PRESSURE: 80 MMHG | HEART RATE: 87 BPM

## 2024-07-23 DIAGNOSIS — R19.7 VOMITING AND DIARRHEA: ICD-10-CM

## 2024-07-23 DIAGNOSIS — R11.10 VOMITING AND DIARRHEA: ICD-10-CM

## 2024-07-23 DIAGNOSIS — R63.0 ANOREXIA: ICD-10-CM

## 2024-07-23 DIAGNOSIS — D50.0 IRON DEFICIENCY ANEMIA DUE TO CHRONIC BLOOD LOSS: Primary | ICD-10-CM

## 2024-07-23 DIAGNOSIS — E61.1 IRON DEFICIENCY: ICD-10-CM

## 2024-07-23 DIAGNOSIS — E44.1 MILD PROTEIN-CALORIE MALNUTRITION (HCC): ICD-10-CM

## 2024-07-23 RX ORDER — METHYLPREDNISOLONE SODIUM SUCCINATE 40 MG/ML
40 INJECTION, POWDER, LYOPHILIZED, FOR SOLUTION INTRAMUSCULAR; INTRAVENOUS ONCE
Status: CANCELLED
Start: 2024-07-26 | End: 2024-07-24

## 2024-07-23 NOTE — PROGRESS NOTES
Pt arrived on unit for hydration. IV access attempted x 7 by 3 different staff members. All attempts were approved by patient. All were unsuccessful. Phone call made to anesthesia to see if someone was available to start her IV. No anesthesia personnel available at this time. Pt rescheduled to 7/26/24 at 1030. Pt verbalized understanding and discharged in satisfactory condition.

## 2024-07-26 ENCOUNTER — HOSPITAL ENCOUNTER (OUTPATIENT)
Dept: INFUSION CENTER | Facility: HOSPITAL | Age: 49
End: 2024-07-26
Attending: INTERNAL MEDICINE
Payer: COMMERCIAL

## 2024-07-26 VITALS
HEART RATE: 71 BPM | SYSTOLIC BLOOD PRESSURE: 115 MMHG | TEMPERATURE: 97.4 F | RESPIRATION RATE: 16 BRPM | OXYGEN SATURATION: 100 % | DIASTOLIC BLOOD PRESSURE: 70 MMHG

## 2024-07-26 DIAGNOSIS — E61.1 IRON DEFICIENCY: ICD-10-CM

## 2024-07-26 DIAGNOSIS — D50.0 IRON DEFICIENCY ANEMIA DUE TO CHRONIC BLOOD LOSS: Primary | ICD-10-CM

## 2024-07-26 DIAGNOSIS — R63.0 ANOREXIA: ICD-10-CM

## 2024-07-26 DIAGNOSIS — R19.7 VOMITING AND DIARRHEA: ICD-10-CM

## 2024-07-26 DIAGNOSIS — E44.1 MILD PROTEIN-CALORIE MALNUTRITION (HCC): ICD-10-CM

## 2024-07-26 DIAGNOSIS — R11.10 VOMITING AND DIARRHEA: ICD-10-CM

## 2024-07-26 PROCEDURE — 96361 HYDRATE IV INFUSION ADD-ON: CPT

## 2024-07-26 PROCEDURE — 96360 HYDRATION IV INFUSION INIT: CPT

## 2024-07-26 RX ORDER — METHYLPREDNISOLONE SODIUM SUCCINATE 40 MG/ML
40 INJECTION, POWDER, LYOPHILIZED, FOR SOLUTION INTRAMUSCULAR; INTRAVENOUS ONCE
Status: CANCELLED
Start: 2024-07-27 | End: 2024-07-27

## 2024-07-26 RX ADMIN — SODIUM CHLORIDE 1000 ML: 0.9 INJECTION, SOLUTION INTRAVENOUS at 11:29

## 2024-07-26 NOTE — PROGRESS NOTES
Celine Roa tolerated IV hydration well with no complications.      Celine Roa is aware of future appt on 08/07/24 at 0800.     AVS declined.

## 2024-08-06 ENCOUNTER — HOSPITAL ENCOUNTER (OUTPATIENT)
Dept: INFUSION CENTER | Facility: HOSPITAL | Age: 49
Discharge: HOME | End: 2024-08-06
Payer: COMMERCIAL

## 2024-08-06 VITALS
TEMPERATURE: 98.5 F | DIASTOLIC BLOOD PRESSURE: 80 MMHG | RESPIRATION RATE: 17 BRPM | OXYGEN SATURATION: 98 % | HEART RATE: 79 BPM | SYSTOLIC BLOOD PRESSURE: 110 MMHG

## 2024-08-06 DIAGNOSIS — D50.0 IRON DEFICIENCY ANEMIA DUE TO CHRONIC BLOOD LOSS: ICD-10-CM

## 2024-08-06 DIAGNOSIS — E44.1 MILD PROTEIN-CALORIE MALNUTRITION (HCC): ICD-10-CM

## 2024-08-06 DIAGNOSIS — L50.1 CHRONIC IDIOPATHIC URTICARIA: Primary | ICD-10-CM

## 2024-08-06 DIAGNOSIS — R63.0 ANOREXIA: ICD-10-CM

## 2024-08-06 DIAGNOSIS — R11.10 VOMITING AND DIARRHEA: ICD-10-CM

## 2024-08-06 DIAGNOSIS — R19.7 VOMITING AND DIARRHEA: ICD-10-CM

## 2024-08-06 DIAGNOSIS — E61.1 IRON DEFICIENCY: ICD-10-CM

## 2024-08-06 PROCEDURE — 96361 HYDRATE IV INFUSION ADD-ON: CPT

## 2024-08-06 PROCEDURE — 96372 THER/PROPH/DIAG INJ SC/IM: CPT

## 2024-08-06 PROCEDURE — 96375 TX/PRO/DX INJ NEW DRUG ADDON: CPT

## 2024-08-06 PROCEDURE — 96367 TX/PROPH/DG ADDL SEQ IV INF: CPT

## 2024-08-06 PROCEDURE — 96365 THER/PROPH/DIAG IV INF INIT: CPT

## 2024-08-06 RX ORDER — EPINEPHRINE 1 MG/ML
0.3 INJECTION, SOLUTION, CONCENTRATE INTRAVENOUS
Status: DISCONTINUED | OUTPATIENT
Start: 2024-08-06 | End: 2024-08-09 | Stop reason: HOSPADM

## 2024-08-06 RX ORDER — METHYLPREDNISOLONE SODIUM SUCCINATE 40 MG/ML
40 INJECTION, POWDER, LYOPHILIZED, FOR SOLUTION INTRAMUSCULAR; INTRAVENOUS ONCE
Status: CANCELLED
Start: 2024-08-07 | End: 2024-08-07

## 2024-08-06 RX ORDER — METHYLPREDNISOLONE SODIUM SUCCINATE 40 MG/ML
40 INJECTION, POWDER, LYOPHILIZED, FOR SOLUTION INTRAMUSCULAR; INTRAVENOUS ONCE
Status: COMPLETED | OUTPATIENT
Start: 2024-08-06 | End: 2024-08-06

## 2024-08-06 RX ORDER — EPINEPHRINE 1 MG/ML
0.3 INJECTION, SOLUTION, CONCENTRATE INTRAVENOUS
OUTPATIENT
Start: 2024-08-09

## 2024-08-06 RX ADMIN — OMALIZUMAB 300 MG: 150 INJECTION, SOLUTION SUBCUTANEOUS at 12:24

## 2024-08-06 RX ADMIN — METHYLPREDNISOLONE SODIUM SUCCINATE 40 MG: 40 INJECTION, POWDER, FOR SOLUTION INTRAMUSCULAR; INTRAVENOUS at 12:09

## 2024-08-06 RX ADMIN — DIPHENHYDRAMINE HYDROCHLORIDE 50 MG: 50 INJECTION, SOLUTION INTRAMUSCULAR; INTRAVENOUS at 10:48

## 2024-08-06 RX ADMIN — FAMOTIDINE 40 MG: 10 INJECTION, SOLUTION INTRAVENOUS at 11:27

## 2024-08-06 RX ADMIN — SODIUM CHLORIDE 1000 ML: 0.9 INJECTION, SOLUTION INTRAVENOUS at 10:00

## 2024-08-06 NOTE — PROGRESS NOTES
Pt stated she has been vomiting for the past few days. Went away on vacation and she came back sick. Requesting hydration and pre meds daily this week then weekly. Orders per Dr. Levine are daily as needed. Pt scheduled daily for the rest of this week.

## 2024-08-06 NOTE — PROGRESS NOTES
Celine Roa  tolerated hydration, IV meds and injections well with no complications.      Celine Roa is aware of future appt on 8/7/24 at 8.     AVS printed and given to Celine Roa:  No (Declined by Celine Roa) x

## 2024-08-07 ENCOUNTER — HOSPITAL ENCOUNTER (OUTPATIENT)
Dept: INFUSION CENTER | Facility: HOSPITAL | Age: 49
Discharge: HOME | End: 2024-08-07
Payer: COMMERCIAL

## 2024-08-07 VITALS
HEART RATE: 96 BPM | RESPIRATION RATE: 16 BRPM | SYSTOLIC BLOOD PRESSURE: 129 MMHG | DIASTOLIC BLOOD PRESSURE: 81 MMHG | OXYGEN SATURATION: 96 % | TEMPERATURE: 97.6 F

## 2024-08-07 DIAGNOSIS — D50.0 IRON DEFICIENCY ANEMIA DUE TO CHRONIC BLOOD LOSS: Primary | ICD-10-CM

## 2024-08-07 DIAGNOSIS — R19.7 VOMITING AND DIARRHEA: ICD-10-CM

## 2024-08-07 DIAGNOSIS — E61.1 IRON DEFICIENCY: ICD-10-CM

## 2024-08-07 DIAGNOSIS — R11.10 VOMITING AND DIARRHEA: ICD-10-CM

## 2024-08-07 DIAGNOSIS — L50.1 CHRONIC IDIOPATHIC URTICARIA: ICD-10-CM

## 2024-08-07 DIAGNOSIS — R63.0 ANOREXIA: ICD-10-CM

## 2024-08-07 DIAGNOSIS — E44.1 MILD PROTEIN-CALORIE MALNUTRITION (HCC): ICD-10-CM

## 2024-08-07 PROCEDURE — 96375 TX/PRO/DX INJ NEW DRUG ADDON: CPT

## 2024-08-07 PROCEDURE — 96365 THER/PROPH/DIAG IV INF INIT: CPT

## 2024-08-07 PROCEDURE — 96361 HYDRATE IV INFUSION ADD-ON: CPT

## 2024-08-07 PROCEDURE — 96367 TX/PROPH/DG ADDL SEQ IV INF: CPT

## 2024-08-07 RX ORDER — METHYLPREDNISOLONE SODIUM SUCCINATE 40 MG/ML
40 INJECTION, POWDER, LYOPHILIZED, FOR SOLUTION INTRAMUSCULAR; INTRAVENOUS ONCE
Status: CANCELLED
Start: 2024-08-08 | End: 2024-08-08

## 2024-08-07 RX ORDER — METHYLPREDNISOLONE SODIUM SUCCINATE 40 MG/ML
40 INJECTION, POWDER, LYOPHILIZED, FOR SOLUTION INTRAMUSCULAR; INTRAVENOUS ONCE
Status: COMPLETED | OUTPATIENT
Start: 2024-08-07 | End: 2024-08-07

## 2024-08-07 RX ADMIN — FAMOTIDINE 40 MG: 10 INJECTION, SOLUTION INTRAVENOUS at 08:57

## 2024-08-07 RX ADMIN — METHYLPREDNISOLONE SODIUM SUCCINATE 40 MG: 40 INJECTION, POWDER, FOR SOLUTION INTRAMUSCULAR; INTRAVENOUS at 09:29

## 2024-08-07 RX ADMIN — DIPHENHYDRAMINE HYDROCHLORIDE 50 MG: 50 INJECTION, SOLUTION INTRAMUSCULAR; INTRAVENOUS at 08:21

## 2024-08-07 RX ADMIN — SODIUM CHLORIDE 1000 ML: 0.9 INJECTION, SOLUTION INTRAVENOUS at 08:16

## 2024-08-07 NOTE — PROGRESS NOTES
Celine Roa tolerated IV hydration well with no complications.      Celine Roa is aware of future appt on 08/08/24 at 1030.     AVS declined.

## 2024-08-08 ENCOUNTER — HOSPITAL ENCOUNTER (OUTPATIENT)
Dept: INFUSION CENTER | Facility: HOSPITAL | Age: 49
Discharge: HOME | End: 2024-08-08
Payer: COMMERCIAL

## 2024-08-08 VITALS
DIASTOLIC BLOOD PRESSURE: 78 MMHG | RESPIRATION RATE: 16 BRPM | TEMPERATURE: 96.8 F | SYSTOLIC BLOOD PRESSURE: 115 MMHG | OXYGEN SATURATION: 98 % | HEART RATE: 96 BPM

## 2024-08-08 DIAGNOSIS — R63.0 ANOREXIA: ICD-10-CM

## 2024-08-08 DIAGNOSIS — D89.40 MAST CELL ACTIVATION SYNDROME (HCC): ICD-10-CM

## 2024-08-08 DIAGNOSIS — R11.10 VOMITING AND DIARRHEA: Primary | ICD-10-CM

## 2024-08-08 DIAGNOSIS — D50.0 IRON DEFICIENCY ANEMIA DUE TO CHRONIC BLOOD LOSS: ICD-10-CM

## 2024-08-08 DIAGNOSIS — R19.7 VOMITING AND DIARRHEA: Primary | ICD-10-CM

## 2024-08-08 DIAGNOSIS — E61.1 IRON DEFICIENCY: ICD-10-CM

## 2024-08-08 DIAGNOSIS — E44.1 MILD PROTEIN-CALORIE MALNUTRITION (HCC): ICD-10-CM

## 2024-08-08 PROCEDURE — 96375 TX/PRO/DX INJ NEW DRUG ADDON: CPT

## 2024-08-08 PROCEDURE — 96367 TX/PROPH/DG ADDL SEQ IV INF: CPT

## 2024-08-08 PROCEDURE — 96361 HYDRATE IV INFUSION ADD-ON: CPT

## 2024-08-08 PROCEDURE — 96365 THER/PROPH/DIAG IV INF INIT: CPT

## 2024-08-08 RX ORDER — FAMOTIDINE 40 MG/1
40 TABLET, FILM COATED ORAL DAILY
Qty: 90 TABLET | Refills: 0 | Status: SHIPPED | OUTPATIENT
Start: 2024-08-08

## 2024-08-08 RX ORDER — METHYLPREDNISOLONE SODIUM SUCCINATE 40 MG/ML
40 INJECTION, POWDER, LYOPHILIZED, FOR SOLUTION INTRAMUSCULAR; INTRAVENOUS ONCE
Status: COMPLETED | OUTPATIENT
Start: 2024-08-08 | End: 2024-08-08

## 2024-08-08 RX ORDER — METHYLPREDNISOLONE SODIUM SUCCINATE 40 MG/ML
40 INJECTION, POWDER, LYOPHILIZED, FOR SOLUTION INTRAMUSCULAR; INTRAVENOUS ONCE
Status: CANCELLED
Start: 2024-08-09 | End: 2024-08-09

## 2024-08-08 RX ADMIN — SODIUM CHLORIDE 1000 ML: 0.9 INJECTION, SOLUTION INTRAVENOUS at 11:02

## 2024-08-08 RX ADMIN — METHYLPREDNISOLONE SODIUM SUCCINATE 40 MG: 40 INJECTION, POWDER, FOR SOLUTION INTRAMUSCULAR; INTRAVENOUS at 10:56

## 2024-08-08 RX ADMIN — FAMOTIDINE 40 MG: 10 INJECTION, SOLUTION INTRAVENOUS at 11:29

## 2024-08-08 RX ADMIN — DIPHENHYDRAMINE HYDROCHLORIDE 50 MG: 50 INJECTION, SOLUTION INTRAMUSCULAR; INTRAVENOUS at 11:03

## 2024-08-09 ENCOUNTER — HOSPITAL ENCOUNTER (OUTPATIENT)
Dept: INFUSION CENTER | Facility: HOSPITAL | Age: 49
End: 2024-08-09
Payer: COMMERCIAL

## 2024-08-09 VITALS
RESPIRATION RATE: 16 BRPM | OXYGEN SATURATION: 96 % | HEART RATE: 104 BPM | SYSTOLIC BLOOD PRESSURE: 120 MMHG | TEMPERATURE: 98.1 F | DIASTOLIC BLOOD PRESSURE: 76 MMHG

## 2024-08-09 DIAGNOSIS — D50.0 IRON DEFICIENCY ANEMIA DUE TO CHRONIC BLOOD LOSS: ICD-10-CM

## 2024-08-09 DIAGNOSIS — R19.7 VOMITING AND DIARRHEA: Primary | ICD-10-CM

## 2024-08-09 DIAGNOSIS — R63.0 ANOREXIA: ICD-10-CM

## 2024-08-09 DIAGNOSIS — R11.10 VOMITING AND DIARRHEA: Primary | ICD-10-CM

## 2024-08-09 DIAGNOSIS — E44.1 MILD PROTEIN-CALORIE MALNUTRITION (HCC): ICD-10-CM

## 2024-08-09 DIAGNOSIS — E61.1 IRON DEFICIENCY: ICD-10-CM

## 2024-08-09 PROCEDURE — 96365 THER/PROPH/DIAG IV INF INIT: CPT

## 2024-08-09 PROCEDURE — 96361 HYDRATE IV INFUSION ADD-ON: CPT

## 2024-08-09 PROCEDURE — 96375 TX/PRO/DX INJ NEW DRUG ADDON: CPT

## 2024-08-09 RX ORDER — METHYLPREDNISOLONE SODIUM SUCCINATE 40 MG/ML
40 INJECTION, POWDER, LYOPHILIZED, FOR SOLUTION INTRAMUSCULAR; INTRAVENOUS ONCE
Status: CANCELLED
Start: 2024-08-14 | End: 2024-08-10

## 2024-08-09 RX ORDER — METHYLPREDNISOLONE SODIUM SUCCINATE 40 MG/ML
40 INJECTION, POWDER, LYOPHILIZED, FOR SOLUTION INTRAMUSCULAR; INTRAVENOUS ONCE
Status: COMPLETED | OUTPATIENT
Start: 2024-08-09 | End: 2024-08-09

## 2024-08-09 RX ADMIN — SODIUM CHLORIDE 1000 ML: 0.9 INJECTION, SOLUTION INTRAVENOUS at 10:57

## 2024-08-09 RX ADMIN — DIPHENHYDRAMINE HYDROCHLORIDE 50 MG: 50 INJECTION, SOLUTION INTRAMUSCULAR; INTRAVENOUS at 10:57

## 2024-08-09 RX ADMIN — METHYLPREDNISOLONE SODIUM SUCCINATE 40 MG: 40 INJECTION, POWDER, FOR SOLUTION INTRAMUSCULAR; INTRAVENOUS at 12:08

## 2024-08-09 RX ADMIN — FAMOTIDINE 40 MG: 10 INJECTION, SOLUTION INTRAVENOUS at 11:33

## 2024-08-09 NOTE — PLAN OF CARE
Problem: INFECTION - ADULT  Goal: Absence or prevention of progression during hospitalization  Description: INTERVENTIONS:  - Assess and monitor for signs and symptoms of infection  - Monitor lab/diagnostic results  - Monitor all insertion sites, i.e. indwelling lines, tubes, and drains  - Monitor endotracheal if appropriate and nasal secretions for changes in amount and color  - [place] appropriate cooling/warming therapies per order  - Administer medications as ordered  - Instruct and encourage patient and family to use good hand hygiene technique  - Identify and instruct in appropriate isolation precautions for identified infection/condition  Reactivated     Problem: Knowledge Deficit  Goal: Patient/family/caregiver demonstrates understanding of disease process, treatment plan, medications, and discharge instructions  Description: Complete learning assessment and assess knowledge base.  Interventions:  - Provide teaching at level of understanding  - Provide teaching via preferred learning methods  Reactivated

## 2024-08-09 NOTE — PROGRESS NOTES
Celine Roa tolerated IV hydration well with no complications.      Celine Roa is aware of future appt on 08/14/24 at 0930.     AVS declined.

## 2024-08-12 ENCOUNTER — OFFICE VISIT (OUTPATIENT)
Dept: FAMILY MEDICINE CLINIC | Facility: HOME HEALTHCARE | Age: 49
End: 2024-08-12
Payer: COMMERCIAL

## 2024-08-12 VITALS
HEIGHT: 65 IN | SYSTOLIC BLOOD PRESSURE: 112 MMHG | DIASTOLIC BLOOD PRESSURE: 78 MMHG | WEIGHT: 164.8 LBS | HEART RATE: 111 BPM | RESPIRATION RATE: 16 BRPM | TEMPERATURE: 98.6 F | BODY MASS INDEX: 27.46 KG/M2 | OXYGEN SATURATION: 96 %

## 2024-08-12 DIAGNOSIS — E03.9 ACQUIRED HYPOTHYROIDISM: ICD-10-CM

## 2024-08-12 DIAGNOSIS — Z12.11 SCREENING FOR COLON CANCER: ICD-10-CM

## 2024-08-12 DIAGNOSIS — M32.9 SYSTEMIC LUPUS ERYTHEMATOSUS, UNSPECIFIED SLE TYPE, UNSPECIFIED ORGAN INVOLVEMENT STATUS (HCC): ICD-10-CM

## 2024-08-12 DIAGNOSIS — D89.40 MAST CELL ACTIVATION SYNDROME (HCC): ICD-10-CM

## 2024-08-12 DIAGNOSIS — D84.9 IMMUNOSUPPRESSION (HCC): Primary | ICD-10-CM

## 2024-08-12 DIAGNOSIS — K50.119 CROHN'S DISEASE OF COLON WITH COMPLICATION (HCC): ICD-10-CM

## 2024-08-12 PROCEDURE — 99213 OFFICE O/P EST LOW 20 MIN: CPT | Performed by: FAMILY MEDICINE

## 2024-08-13 ENCOUNTER — HOSPITAL ENCOUNTER (OUTPATIENT)
Dept: INFUSION CENTER | Facility: HOSPITAL | Age: 49
Discharge: HOME | End: 2024-08-13

## 2024-08-13 VITALS
RESPIRATION RATE: 16 BRPM | HEART RATE: 104 BPM | OXYGEN SATURATION: 95 % | TEMPERATURE: 98.4 F | DIASTOLIC BLOOD PRESSURE: 74 MMHG | SYSTOLIC BLOOD PRESSURE: 113 MMHG

## 2024-08-13 NOTE — PROGRESS NOTES
Multiple unsuccessful attempts for IV access made by multiple RNs. Anesthesia contacted to place IV, patient prefers to defer treatment today.    Patient aware of future appointment on 08/14/24 at 0930.    AVS declined.

## 2024-08-19 ENCOUNTER — APPOINTMENT (EMERGENCY)
Dept: CT IMAGING | Facility: HOSPITAL | Age: 49
End: 2024-08-19
Payer: COMMERCIAL

## 2024-08-19 ENCOUNTER — HOSPITAL ENCOUNTER (OUTPATIENT)
Facility: HOSPITAL | Age: 49
Setting detail: OBSERVATION
Discharge: HOME | End: 2024-08-21
Attending: EMERGENCY MEDICINE | Admitting: INTERNAL MEDICINE
Payer: COMMERCIAL

## 2024-08-19 ENCOUNTER — NURSE TRIAGE (OUTPATIENT)
Age: 49
End: 2024-08-19

## 2024-08-19 DIAGNOSIS — K50.90 CROHN'S DISEASE (HCC): Primary | ICD-10-CM

## 2024-08-19 DIAGNOSIS — K52.9 PROCTOCOLITIS: ICD-10-CM

## 2024-08-19 LAB
2HR DELTA HS TROPONIN: <-52 NG/L
4HR DELTA HS TROPONIN: <-52 NG/L
ALBUMIN SERPL BCG-MCNC: 3.9 G/DL (ref 3.5–5)
ALP SERPL-CCNC: 71 U/L (ref 34–104)
ALT SERPL W P-5'-P-CCNC: 25 U/L (ref 7–52)
AMPHETAMINES SERPL QL SCN: NEGATIVE
ANION GAP SERPL CALCULATED.3IONS-SCNC: 10 MMOL/L (ref 4–13)
AST SERPL W P-5'-P-CCNC: 20 U/L (ref 13–39)
ATRIAL RATE: 98 BPM
BACTERIA UR QL AUTO: ABNORMAL /HPF
BACTERIA UR QL AUTO: NORMAL /HPF
BARBITURATES UR QL: NEGATIVE
BASOPHILS # BLD AUTO: 0.12 THOUSANDS/ÂΜL (ref 0–0.1)
BASOPHILS NFR BLD AUTO: 1 % (ref 0–1)
BENZODIAZ UR QL: NEGATIVE
BILIRUB SERPL-MCNC: 0.51 MG/DL (ref 0.2–1)
BILIRUB UR QL STRIP: NEGATIVE
BILIRUB UR QL STRIP: NEGATIVE
BUN SERPL-MCNC: 7 MG/DL (ref 5–25)
CALCIUM SERPL-MCNC: 8.8 MG/DL (ref 8.4–10.2)
CARDIAC TROPONIN I PNL SERPL HS: 54 NG/L
CARDIAC TROPONIN I PNL SERPL HS: <2 NG/L
CARDIAC TROPONIN I PNL SERPL HS: <2 NG/L
CHLORIDE SERPL-SCNC: 102 MMOL/L (ref 96–108)
CLARITY UR: CLEAR
CLARITY UR: CLEAR
CO2 SERPL-SCNC: 26 MMOL/L (ref 21–32)
COCAINE UR QL: NEGATIVE
COLOR UR: COLORLESS
COLOR UR: YELLOW
CREAT SERPL-MCNC: 0.84 MG/DL (ref 0.6–1.3)
CRP SERPL QL: 20.4 MG/L
EOSINOPHIL # BLD AUTO: 0.51 THOUSAND/ÂΜL (ref 0–0.61)
EOSINOPHIL NFR BLD AUTO: 6 % (ref 0–6)
ERYTHROCYTE [DISTWIDTH] IN BLOOD BY AUTOMATED COUNT: 13.2 % (ref 11.6–15.1)
FENTANYL UR QL SCN: NEGATIVE
FLUAV RNA RESP QL NAA+PROBE: NEGATIVE
FLUBV RNA RESP QL NAA+PROBE: NEGATIVE
GFR SERPL CREATININE-BSD FRML MDRD: 80 ML/MIN/1.73SQ M
GLUCOSE SERPL-MCNC: 94 MG/DL (ref 65–140)
GLUCOSE UR STRIP-MCNC: ABNORMAL MG/DL
GLUCOSE UR STRIP-MCNC: NEGATIVE MG/DL
HCT VFR BLD AUTO: 45.8 % (ref 34.8–46.1)
HGB BLD-MCNC: 15.2 G/DL (ref 11.5–15.4)
HGB UR QL STRIP.AUTO: ABNORMAL
HGB UR QL STRIP.AUTO: ABNORMAL
HYDROCODONE UR QL SCN: NEGATIVE
IMM GRANULOCYTES # BLD AUTO: 0.03 THOUSAND/UL (ref 0–0.2)
IMM GRANULOCYTES NFR BLD AUTO: 0 % (ref 0–2)
KETONES UR STRIP-MCNC: ABNORMAL MG/DL
KETONES UR STRIP-MCNC: NEGATIVE MG/DL
LEUKOCYTE ESTERASE UR QL STRIP: NEGATIVE
LEUKOCYTE ESTERASE UR QL STRIP: NEGATIVE
LIPASE SERPL-CCNC: 12 U/L (ref 11–82)
LYMPHOCYTES # BLD AUTO: 1.67 THOUSANDS/ÂΜL (ref 0.6–4.47)
LYMPHOCYTES NFR BLD AUTO: 19 % (ref 14–44)
MAGNESIUM SERPL-MCNC: 2.2 MG/DL (ref 1.9–2.7)
MCH RBC QN AUTO: 31.9 PG (ref 26.8–34.3)
MCHC RBC AUTO-ENTMCNC: 33.2 G/DL (ref 31.4–37.4)
MCV RBC AUTO: 96 FL (ref 82–98)
METHADONE UR QL: NEGATIVE
MONOCYTES # BLD AUTO: 0.73 THOUSAND/ÂΜL (ref 0.17–1.22)
MONOCYTES NFR BLD AUTO: 8 % (ref 4–12)
NEUTROPHILS # BLD AUTO: 5.8 THOUSANDS/ÂΜL (ref 1.85–7.62)
NEUTS SEG NFR BLD AUTO: 66 % (ref 43–75)
NITRITE UR QL STRIP: NEGATIVE
NITRITE UR QL STRIP: NEGATIVE
NON-SQ EPI CELLS URNS QL MICRO: ABNORMAL /HPF
NON-SQ EPI CELLS URNS QL MICRO: NORMAL /HPF
NRBC BLD AUTO-RTO: 0 /100 WBCS
OPIATES UR QL SCN: NEGATIVE
OXYCODONE+OXYMORPHONE UR QL SCN: NEGATIVE
P AXIS: 32 DEGREES
PCP UR QL: NEGATIVE
PH UR STRIP.AUTO: 5.5 [PH]
PH UR STRIP.AUTO: 6.5 [PH]
PLATELET # BLD AUTO: 449 THOUSANDS/UL (ref 149–390)
PMV BLD AUTO: 8.7 FL (ref 8.9–12.7)
POTASSIUM SERPL-SCNC: 3.9 MMOL/L (ref 3.5–5.3)
PR INTERVAL: 130 MS
PROT SERPL-MCNC: 7.1 G/DL (ref 6.4–8.4)
PROT UR STRIP-MCNC: NEGATIVE MG/DL
PROT UR STRIP-MCNC: NEGATIVE MG/DL
QRS AXIS: 29 DEGREES
QRSD INTERVAL: 80 MS
QT INTERVAL: 374 MS
QTC INTERVAL: 477 MS
RBC # BLD AUTO: 4.77 MILLION/UL (ref 3.81–5.12)
RBC #/AREA URNS AUTO: ABNORMAL /HPF
RBC #/AREA URNS AUTO: NORMAL /HPF
RSV RNA RESP QL NAA+PROBE: NEGATIVE
SARS-COV-2 RNA RESP QL NAA+PROBE: NEGATIVE
SODIUM SERPL-SCNC: 138 MMOL/L (ref 135–147)
SP GR UR STRIP.AUTO: 1.01 (ref 1–1.03)
SP GR UR STRIP.AUTO: 1.01 (ref 1–1.03)
T WAVE AXIS: 74 DEGREES
THC UR QL: NEGATIVE
URINE COMMENT: NORMAL
UROBILINOGEN UR STRIP-ACNC: <2 MG/DL
UROBILINOGEN UR STRIP-ACNC: <2 MG/DL
VENTRICULAR RATE: 98 BPM
WBC # BLD AUTO: 8.86 THOUSAND/UL (ref 4.31–10.16)
WBC #/AREA URNS AUTO: ABNORMAL /HPF
WBC #/AREA URNS AUTO: NORMAL /HPF

## 2024-08-19 PROCEDURE — 81001 URINALYSIS AUTO W/SCOPE: CPT | Performed by: INTERNAL MEDICINE

## 2024-08-19 PROCEDURE — 96375 TX/PRO/DX INJ NEW DRUG ADDON: CPT

## 2024-08-19 PROCEDURE — 81001 URINALYSIS AUTO W/SCOPE: CPT

## 2024-08-19 PROCEDURE — 99284 EMERGENCY DEPT VISIT MOD MDM: CPT

## 2024-08-19 PROCEDURE — 83690 ASSAY OF LIPASE: CPT

## 2024-08-19 PROCEDURE — 99285 EMERGENCY DEPT VISIT HI MDM: CPT | Performed by: EMERGENCY MEDICINE

## 2024-08-19 PROCEDURE — 93005 ELECTROCARDIOGRAM TRACING: CPT

## 2024-08-19 PROCEDURE — 84484 ASSAY OF TROPONIN QUANT: CPT

## 2024-08-19 PROCEDURE — 74176 CT ABD & PELVIS W/O CONTRAST: CPT

## 2024-08-19 PROCEDURE — 87086 URINE CULTURE/COLONY COUNT: CPT | Performed by: INTERNAL MEDICINE

## 2024-08-19 PROCEDURE — 36415 COLL VENOUS BLD VENIPUNCTURE: CPT

## 2024-08-19 PROCEDURE — 96374 THER/PROPH/DIAG INJ IV PUSH: CPT

## 2024-08-19 PROCEDURE — 80307 DRUG TEST PRSMV CHEM ANLYZR: CPT | Performed by: INTERNAL MEDICINE

## 2024-08-19 PROCEDURE — 93010 ELECTROCARDIOGRAM REPORT: CPT | Performed by: INTERNAL MEDICINE

## 2024-08-19 PROCEDURE — 86140 C-REACTIVE PROTEIN: CPT

## 2024-08-19 PROCEDURE — 99223 1ST HOSP IP/OBS HIGH 75: CPT | Performed by: INTERNAL MEDICINE

## 2024-08-19 PROCEDURE — 83735 ASSAY OF MAGNESIUM: CPT

## 2024-08-19 PROCEDURE — 0241U HB NFCT DS VIR RESP RNA 4 TRGT: CPT | Performed by: INTERNAL MEDICINE

## 2024-08-19 PROCEDURE — 80053 COMPREHEN METABOLIC PANEL: CPT

## 2024-08-19 PROCEDURE — 96361 HYDRATE IV INFUSION ADD-ON: CPT

## 2024-08-19 PROCEDURE — 85025 COMPLETE CBC W/AUTO DIFF WBC: CPT

## 2024-08-19 RX ORDER — METHYLPREDNISOLONE SODIUM SUCCINATE 40 MG/ML
40 INJECTION, POWDER, LYOPHILIZED, FOR SOLUTION INTRAMUSCULAR; INTRAVENOUS DAILY
Status: DISCONTINUED | OUTPATIENT
Start: 2024-08-20 | End: 2024-08-21 | Stop reason: HOSPADM

## 2024-08-19 RX ORDER — BUTALBITAL, ACETAMINOPHEN AND CAFFEINE 50; 325; 40 MG/1; MG/1; MG/1
1 TABLET ORAL EVERY 8 HOURS PRN
Status: DISCONTINUED | OUTPATIENT
Start: 2024-08-19 | End: 2024-08-21 | Stop reason: HOSPADM

## 2024-08-19 RX ORDER — ASCORBIC ACID, THIAMINE MONONITRATE,RIBOFLAVIN, NIACINAMIDE, PYRIDOXINE HYDROCHLORIDE, FOLIC ACID, CYANOCOBALAMIN, BIOTIN, CALCIUM PANTOTHENATE, 100; 1.5; 1.7; 20; 10; 1; 6000; 150000; 5 MG/1; MG/1; MG/1; MG/1; MG/1; MG/1; UG/1; UG/1; MG/1
1 CAPSULE, LIQUID FILLED ORAL
Status: DISCONTINUED | OUTPATIENT
Start: 2024-08-20 | End: 2024-08-20

## 2024-08-19 RX ORDER — DIPHENHYDRAMINE HCL 50 MG
50 CAPSULE ORAL 4 TIMES DAILY PRN
Status: DISCONTINUED | OUTPATIENT
Start: 2024-08-19 | End: 2024-08-19

## 2024-08-19 RX ORDER — LANOLIN ALCOHOL/MO/W.PET/CERES
6 CREAM (GRAM) TOPICAL
Status: DISCONTINUED | OUTPATIENT
Start: 2024-08-19 | End: 2024-08-20

## 2024-08-19 RX ORDER — DIPHENHYDRAMINE HYDROCHLORIDE 50 MG/ML
25 INJECTION INTRAMUSCULAR; INTRAVENOUS EVERY 6 HOURS PRN
Status: DISCONTINUED | OUTPATIENT
Start: 2024-08-19 | End: 2024-08-20

## 2024-08-19 RX ORDER — B-COMPLEX WITH VITAMIN C
TABLET ORAL DAILY
Status: DISCONTINUED | OUTPATIENT
Start: 2024-08-20 | End: 2024-08-19 | Stop reason: SDUPTHER

## 2024-08-19 RX ORDER — LEVOTHYROXINE SODIUM 50 UG/1
75 TABLET ORAL
Status: DISCONTINUED | OUTPATIENT
Start: 2024-08-20 | End: 2024-08-20

## 2024-08-19 RX ORDER — RIBOFLAVIN (VITAMIN B2) 100 MG
100 TABLET ORAL 2 TIMES DAILY
Status: DISCONTINUED | OUTPATIENT
Start: 2024-08-19 | End: 2024-08-19 | Stop reason: RX

## 2024-08-19 RX ORDER — EPINEPHRINE 1 MG/ML
0.5 INJECTION, SOLUTION, CONCENTRATE INTRAVENOUS ONCE
Status: COMPLETED | OUTPATIENT
Start: 2024-08-19 | End: 2024-08-19

## 2024-08-19 RX ORDER — DIPHENHYDRAMINE HYDROCHLORIDE 50 MG/ML
25 INJECTION INTRAMUSCULAR; INTRAVENOUS ONCE
Status: COMPLETED | OUTPATIENT
Start: 2024-08-19 | End: 2024-08-19

## 2024-08-19 RX ORDER — LACTOBACILLUS RHAMNOSUS GG 10B CELL
CAPSULE ORAL DAILY
Status: DISCONTINUED | OUTPATIENT
Start: 2024-08-20 | End: 2024-08-19 | Stop reason: RX

## 2024-08-19 RX ORDER — FAMOTIDINE 20 MG/1
40 TABLET, FILM COATED ORAL
Status: DISCONTINUED | OUTPATIENT
Start: 2024-08-20 | End: 2024-08-20

## 2024-08-19 RX ORDER — ACETAMINOPHEN 325 MG/1
650 TABLET ORAL EVERY 6 HOURS PRN
Status: DISCONTINUED | OUTPATIENT
Start: 2024-08-19 | End: 2024-08-21 | Stop reason: HOSPADM

## 2024-08-19 RX ORDER — FOLIC ACID 1 MG/1
1 TABLET ORAL DAILY
Status: DISCONTINUED | OUTPATIENT
Start: 2024-08-20 | End: 2024-08-20

## 2024-08-19 RX ORDER — SODIUM CHLORIDE, SODIUM GLUCONATE, SODIUM ACETATE, POTASSIUM CHLORIDE, MAGNESIUM CHLORIDE, SODIUM PHOSPHATE, DIBASIC, AND POTASSIUM PHOSPHATE .53; .5; .37; .037; .03; .012; .00082 G/100ML; G/100ML; G/100ML; G/100ML; G/100ML; G/100ML; G/100ML
1000 INJECTION, SOLUTION INTRAVENOUS ONCE
Status: COMPLETED | OUTPATIENT
Start: 2024-08-19 | End: 2024-08-20

## 2024-08-19 RX ORDER — LIOTHYRONINE SODIUM 5 UG/1
5 TABLET ORAL DAILY
Status: DISCONTINUED | OUTPATIENT
Start: 2024-08-20 | End: 2024-08-20

## 2024-08-19 RX ORDER — BUDESONIDE 3 MG/1
9 CAPSULE, COATED PELLETS ORAL DAILY
Status: DISCONTINUED | OUTPATIENT
Start: 2024-08-20 | End: 2024-08-20

## 2024-08-19 RX ORDER — MONTELUKAST SODIUM 10 MG/1
10 TABLET ORAL
Status: DISCONTINUED | OUTPATIENT
Start: 2024-08-19 | End: 2024-08-20

## 2024-08-19 RX ORDER — CETIRIZINE HYDROCHLORIDE 10 MG/1
20 TABLET ORAL 2 TIMES DAILY
Status: DISCONTINUED | OUTPATIENT
Start: 2024-08-19 | End: 2024-08-20

## 2024-08-19 RX ORDER — UREA 10 %
500 LOTION (ML) TOPICAL 2 TIMES DAILY
Status: DISCONTINUED | OUTPATIENT
Start: 2024-08-20 | End: 2024-08-20

## 2024-08-19 RX ORDER — METHYLPREDNISOLONE SODIUM SUCCINATE 125 MG/2ML
80 INJECTION, POWDER, LYOPHILIZED, FOR SOLUTION INTRAMUSCULAR; INTRAVENOUS ONCE
Status: COMPLETED | OUTPATIENT
Start: 2024-08-19 | End: 2024-08-19

## 2024-08-19 RX ORDER — ONDANSETRON 2 MG/ML
4 INJECTION INTRAMUSCULAR; INTRAVENOUS ONCE
Status: COMPLETED | OUTPATIENT
Start: 2024-08-19 | End: 2024-08-19

## 2024-08-19 RX ORDER — FAMOTIDINE 10 MG/ML
20 INJECTION, SOLUTION INTRAVENOUS ONCE
Status: COMPLETED | OUTPATIENT
Start: 2024-08-19 | End: 2024-08-19

## 2024-08-19 RX ORDER — ASCORBIC ACID 500 MG
1000 TABLET ORAL 2 TIMES DAILY
Status: DISCONTINUED | OUTPATIENT
Start: 2024-08-19 | End: 2024-08-20

## 2024-08-19 RX ORDER — HYDROMORPHONE HCL/PF 1 MG/ML
0.5 SYRINGE (ML) INJECTION EVERY 4 HOURS PRN
Status: DISCONTINUED | OUTPATIENT
Start: 2024-08-19 | End: 2024-08-21 | Stop reason: HOSPADM

## 2024-08-19 RX ORDER — SODIUM CHLORIDE, SODIUM GLUCONATE, SODIUM ACETATE, POTASSIUM CHLORIDE, MAGNESIUM CHLORIDE, SODIUM PHOSPHATE, DIBASIC, AND POTASSIUM PHOSPHATE .53; .5; .37; .037; .03; .012; .00082 G/100ML; G/100ML; G/100ML; G/100ML; G/100ML; G/100ML; G/100ML
125 INJECTION, SOLUTION INTRAVENOUS CONTINUOUS
Status: DISCONTINUED | OUTPATIENT
Start: 2024-08-19 | End: 2024-08-21 | Stop reason: HOSPADM

## 2024-08-19 RX ADMIN — FAMOTIDINE 20 MG: 10 INJECTION, SOLUTION INTRAVENOUS at 14:02

## 2024-08-19 RX ADMIN — METHYLPREDNISOLONE SODIUM SUCCINATE 80 MG: 125 INJECTION, POWDER, FOR SOLUTION INTRAMUSCULAR; INTRAVENOUS at 14:02

## 2024-08-19 RX ADMIN — SODIUM CHLORIDE 1000 ML: 0.9 INJECTION, SOLUTION INTRAVENOUS at 14:25

## 2024-08-19 RX ADMIN — DIPHENHYDRAMINE HYDROCHLORIDE 25 MG: 50 INJECTION, SOLUTION INTRAMUSCULAR; INTRAVENOUS at 13:58

## 2024-08-19 RX ADMIN — OXYCODONE HYDROCHLORIDE AND ACETAMINOPHEN 1000 MG: 500 TABLET ORAL at 21:14

## 2024-08-19 RX ADMIN — Medication 6 MG: at 21:12

## 2024-08-19 RX ADMIN — EPINEPHRINE 0.5 MG: 1 INJECTION, SOLUTION, CONCENTRATE INTRAVENOUS at 18:34

## 2024-08-19 RX ADMIN — MONTELUKAST 10 MG: 10 TABLET, FILM COATED ORAL at 21:12

## 2024-08-19 RX ADMIN — SODIUM CHLORIDE, SODIUM GLUCONATE, SODIUM ACETATE, POTASSIUM CHLORIDE AND MAGNESIUM CHLORIDE 1000 ML: 526; 502; 368; 37; 30 INJECTION, SOLUTION INTRAVENOUS at 18:32

## 2024-08-19 RX ADMIN — HYDROMORPHONE HYDROCHLORIDE 0.5 MG: 1 INJECTION, SOLUTION INTRAMUSCULAR; INTRAVENOUS; SUBCUTANEOUS at 19:32

## 2024-08-19 RX ADMIN — SODIUM CHLORIDE, SODIUM GLUCONATE, SODIUM ACETATE, POTASSIUM CHLORIDE, MAGNESIUM CHLORIDE, SODIUM PHOSPHATE, DIBASIC, AND POTASSIUM PHOSPHATE 125 ML/HR: .53; .5; .37; .037; .03; .012; .00082 INJECTION, SOLUTION INTRAVENOUS at 21:12

## 2024-08-19 RX ADMIN — ONDANSETRON 4 MG: 2 INJECTION INTRAMUSCULAR; INTRAVENOUS at 13:59

## 2024-08-19 NOTE — ED PROVIDER NOTES
History  Chief Complaint   Patient presents with    Abdominal Pain     Patient reports hx of Crohn. States flare up the past two weeks. Vomiting x1 week. Also reports diarrhea and blood in stool       Celine Roa is a 49 y.o. female with hereditary alpha tryptase EMEA, Hashimoto's, IBD likely Crohn's, diagnosis of mast cell activation syndrome from [facility], anti-TNF induced lupus, previously on Stelara most recently on Skyrizi but currently off medication came to ED for bloody diarrhea for 2 weeks.   Diarrhea started 2 weeks ago, initially watery and started to notice blood in her bowels.  She has been having fever, and epigastric pain with vomitings and diarrhea. C/o feeling restless, and sleeping disturbances.     She has h/o mast cell activation syndrome and she has been taking hyoscine 0.125 mg, montelukast 10 mg, Benadryl 50 mg, famotidine 40 mg.   Last dose of these medications was this morning.   For her IBS-D:   She has been taking Ibgard.  Last dose was this morning.  She takes Prednisone 20 mg during flare ups. Last prednisone 20 mg dose was on Friday (3 days ago).     8/8/24 and 8/9/24: received hydration and electrolyte infusions. Benadryl 50, Methylprednuisone 40 mg and Nacl 1 L.   ERCP 2/13/24:   Dilated bile duct on cholangiogram,Sludge was removed.   Evidence of prior sphincterotomy at the ampulla. Prominent minor papilla.   Mild abnormal mucosa with erosion in the antrum.   good bile drainage seen.     Tissue path  2/14/24:   A. Duodenum, Biopsy: - Duodenal mucosa with chronic peptic duodenitis-type changes. - No intraepithelial lymphocytosis and no villous blunting. - Negative for dysplasia and malignancy.   B. Stomach, Biopsy: - Gastric antral mucosa with reactive gastropathy. - Oxyntic-type gastric mucosa within normal limits. - No Helicobacter organisms identified on H&E. - Negative for intestinal metaplasia, dysplasia, and malignancy    MRI/MRCP with stable chronic intra  "and extrahepatic biliary ductal dilation.      In the ED,   CT abdomen: Findings suggestive of proctocolitis.  Bilateral nephrolithiasis <3mm stone. No hydronephrosis.   Hepatomegaly.       D/D: IBD flare, Duodenitis    Plan:  Labs: CRP:20, elevated 0 hr troponin 54.pending 2 hr troponin.   Follow up with 2 hr and 4 hr troponins.   EKG: low voltage QRS. NSR. HR: 87  IL Nacl bolus   IV benadryl, Pepcid, methyl prednisone 80 mg given.  NO BM and vomiting since admission.   Reached out to [facility], will do a trial of Clear liquid diet and see how she tolerates.   At the end of my shift, pt was signed out to Dr. Lopez.               Abdominal Pain  Associated symptoms: diarrhea, fever and nausea    Associated symptoms: no chest pain, no chills, no constipation, no cough, no dysuria, no hematuria, no shortness of breath, no sore throat and no vomiting        Prior to Admission Medications   Prescriptions Last Dose Informant Patient Reported? Taking?   Ascorbic Acid (vitamin C) 1000 MG tablet  Self Yes No   Sig: Take 1,000 mg by mouth 2 (two) times a day   B Complex Vitamins (VITAMIN B COMPLEX PO)   Yes No   Sig: Take 100 mg by mouth daily   B-D 3CC LUER-SHAMEKA SYR 25GX1\" 25G X 1\" 3 ML MISC  Self Yes No   HYDROXYZINE HCL PO   Yes No   Sig: Take 25 mg by mouth 4 (four) times a day as needed   Lactobacillus (PROBIOTIC ACIDOPHILUS PO)  Self Yes No   Sig: Take by mouth   Lactobacillus-Inulin ([employer]) CAPS  Self Yes No   Sig: Take 200 mg by mouth daily   Melatonin 3 MG SUBL  Self Yes No   Si mg daily at bedtime   Methotrexate 25 MG/ML SOSY   Yes No   Sig: Inject 50 mg/m2 into a muscle   NEEDLE, DISP, 27 G (B-D DISP NEEDLE 55GH1-9/4\") 27G X 1-4\" MISC  Self No No   Sig: Use once a week   Omalizumab 300 MG/2  ML SOSY   Yes No   Sig: Inject 300 mg under the skin every 21 days   PEPPERMINT OIL PO   Yes No   Sig: Take 1 capsule by mouth 2 (two) times a day   Probiotic Product (CULTURELLE ABDOMINAL SUPPORT " PO)   Yes No   Sig: Take 200 mg by mouth daily   Riboflavin (Vitamin B-2) 100 MG TABS  Self Yes No   Sig: Take 100 mg by mouth 2 (two) times a day   TechLite Lancets MISC  Self Yes No   budesonide (ENTOCORT EC) 3 MG capsule   No No   Sig: Take 3 capsules (9 mg total) by mouth daily   butalbital-acetaminophen-caffeine (FIORICET,ESGIC) -40 mg per tablet  Self No No   Sig: Take 1 tablet by mouth every 8 (eight) hours as needed for migraine   cetirizine (ZyrTEC) 10 mg tablet  Self Yes No   Sig: Take 20 mg by mouth 2 (two) times a day   cholecalciferol (VITAMIN D3) 1,000 units tablet  Self Yes No   Sig: Take 5,000 Units by mouth daily   cyanocobalamin (VITAMIN B-12) 100 mcg tablet  Self Yes No   Sig: Take by mouth daily   diphenhydrAMINE (BENADRYL) 50 mg capsule  Self Yes No   Sig: Take 50 mg by mouth 4 (four) times a day as needed for itching Pt states takes 50mg daily, but may go up to 4 times daily prn   ergocalciferol (VITAMIN D2) 50,000 units   Yes No   Sig: Take 50,000 Units by mouth once a week   famotidine (PEPCID) 40 MG tablet   No No   Sig: TAKE ONE TABLET BY MOUTH ONCE DAILY   fluconazole (DIFLUCAN) 100 mg tablet   Yes No   Patient not taking: Reported on 8/12/2024   folic acid ( Folic Acid) 1 mg tablet  Self No No   Sig: Take 1 tablet (1 mg total) by mouth daily   furosemide (LASIX) 20 mg tablet   Yes No   Sig: Take 20 mg by mouth if needed   hydrOXYzine HCL (ATARAX) 25 mg tablet   No No   Sig: Take 1 tablet (25 mg total) by mouth daily as needed for itching   hyoscyamine (ANASPAZ,LEVSIN) 0.125 MG tablet  Self Yes No   Sig: Take 0.125 mg by mouth every 6 (six) hours as needed   leuprolide 1 mg/0.2 mL  Self No No   Sig: Inject 0.2 mL (1 mg total) under the skin in the morning   levalbuterol (XOPENEX HFA) 45 mcg/act inhaler  Self No No   Sig: Inhale 1-2 puffs every 4 (four) hours as needed for shortness of breath   levothyroxine 50 mcg tablet   Yes No   Sig: Take 75 mcg by mouth daily   liothyronine  (CYTOMEL) 5 mcg tablet  Self No No   Sig: Take 1 tablet (5 mcg total) by mouth daily Do not start before June 14, 2023.   magnesium gluconate (MAGONATE) 500 mg tablet  Self Yes No   Sig: Take 500 mg by mouth 2 (two) times a day   methotrexate 50 MG/2ML injection  Self No No   Sig: Inject 1 mL (25 mg total) under the skin once a week   mometasone (ELOCON) 0.1 % cream   No No   Sig: Apply topically daily   montelukast (SINGULAIR) 10 mg tablet  Self No No   Sig: Take 1 tablet (10 mg total) by mouth daily at bedtime   predniSONE 20 mg tablet  Self No No   Sig: Take 40mg per day for 1 week, then 30mg per day for 1 week, then 20mg per day for 1 week and then 10mg per day for 1 week.   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 pt denies, documented in cardio note    Pain of right thigh 02/13/2023    Pancreatitis     sphinger of     Psychiatric disorder     RA (rheumatoid arthritis) (HCC)     Seizures (HCC)     Sepsis without acute organ dysfunction (HCC)     SIRS (systemic inflammatory response syndrome) (HCC)      Thrombocytosis 04/05/2022    Ulcerative colitis (HCC)     Visual impairment        Past Surgical History:   Procedure Laterality Date    ABDOMINAL SURGERY  2017    APPENDECTOMY      CHOLECYSTECTOMY      COLONOSCOPY  2019    CYSTOSCOPY N/A 06/08/2023    Procedure: CYSTOSCOPY, mini TURBT with fulgeration;  Surgeon: Tee Bruno MD;  Location: [facility];  Service: Urology    EGD AND COLONOSCOPY N/A 09/12/2017    Procedure: EGD AND COLONOSCOPY;  Surgeon: Tommy Oilver MD;  Location:  GI LAB;  Service: Gastroenterology    ERCP N/A 09/15/2017    Procedure: ENDOSCOPIC RETROGRADE CHOLANGIOPANCREATOGRAPHY (ERCP);  Surgeon: Dre Soto MD;  Location: [facility];  Service: Gastroenterology    ERCP  02/14/2024    HYSTERECTOMY      KNEE SURGERY Right     LAPAROSCOPIC ENDOMETRIOSIS FULGURATION      ORIF ANKLE FRACTURE Left     AR ARTHRS KNE SURG W/MENISCECTOMY MED/LAT W/SHVG Left 05/12/2017    Procedure: POSSIBLE MEDIAL MENISECTOMY;  Surgeon: Kristian Avila MD;  Location: [facility];  Service: Orthopedics    AR ARTHRS KNEE DEBRIDEMENT/SHAVING ARTCLR CRTLG Left 05/12/2017    Procedure: KNEE ARTHROSCOPY EVALUATION, CHONDROPLASTY;  Surgeon: Kristian Avila MD;  Location: [facility];  Service: Orthopedics    AR LAPS ABD PRTM&OMENTUM DX W/WO SPEC BR/WA SPX N/A 12/12/2017    Procedure: LAPAROSCOPY DIAGNOSTIC  WITH LYSIS OF ADHESIONS;  Surgeon: Olaf John DO;  Location:  MAIN OR;  Service: General    UPPER GASTROINTESTINAL ENDOSCOPY  2019       Family History   Problem Relation Age of Onset    Ulcerative colitis Mother     Arthritis Mother     Colon polyps Mother     Heart failure Father     Neuropathy Father     Macular degeneration Father     Diabetes Father     Early death Father     Vision loss Father     Asthma Daughter     Hypothyroidism Daughter     Heart disease Maternal Grandmother     Arthritis Maternal Grandmother     No Known Problems Maternal Grandfather     Arthritis Paternal Grandmother     Macular degeneration  Paternal Grandmother     Vision loss Paternal Grandmother     Lymphoma Paternal Grandfather     Cancer Paternal Grandfather     Early death Paternal Grandfather     Breast cancer Maternal Aunt     Cancer Maternal Aunt     Miscarriages / Stillbirths Maternal Aunt     Heart failure Paternal Aunt     Heart failure Paternal Aunt     Irritable bowel syndrome Paternal Aunt     Breast cancer Paternal Aunt 54    Breast cancer additional onset Paternal Aunt 58    Hypothyroidism Paternal Aunt     Cancer Paternal Aunt     No Known Problems Son     No Known Problems Son     Kidney disease Brother     Depression Paternal Uncle     Early death Paternal Uncle      I have reviewed and agree with the history as documented.    E-Cigarette/Vaping    E-Cigarette Use Never User      E-Cigarette/Vaping Substances    Nicotine No     THC No     CBD No     Flavoring No     Other No     Unknown No      Social History     Tobacco Use    Smoking status: Never    Smokeless tobacco: Never   Vaping Use    Vaping status: Never Used   Substance Use Topics    Alcohol use: Never    Drug use: Never        Review of Systems   Constitutional:  Positive for appetite change, diaphoresis and fever. Negative for chills.   HENT: Negative.  Negative for ear pain and sore throat.    Eyes:  Negative for pain and visual disturbance.   Respiratory:  Positive for chest tightness. Negative for cough and shortness of breath.    Cardiovascular:  Positive for palpitations. Negative for chest pain.   Gastrointestinal:  Positive for abdominal pain, blood in stool, diarrhea, nausea and rectal pain. Negative for abdominal distention, anal bleeding, constipation and vomiting.   Genitourinary: Negative.  Negative for dysuria and hematuria.   Musculoskeletal:  Negative for arthralgias and back pain.   Skin:  Positive for rash. Negative for color change.   Allergic/Immunologic: Positive for environmental allergies, food allergies and immunocompromised state.   Neurological:   Positive for headaches. Negative for seizures and syncope.   Hematological:  Bruises/bleeds easily.   Psychiatric/Behavioral:  Positive for sleep disturbance. Negative for agitation. The patient is nervous/anxious.    All other systems reviewed and are negative.      Physical Exam  ED Triage Vitals [08/19/24 1145]   Temperature Pulse Respirations Blood Pressure SpO2   (!) 97.4 °F (36.3 °C) 103 16 126/84 97 %      Temp Source Heart Rate Source Patient Position - Orthostatic VS BP Location FiO2 (%)   Temporal Monitor Sitting Right arm --      Pain Score       5             Orthostatic Vital Signs  Vitals:    08/19/24 1145 08/19/24 1500   BP: 126/84 112/75   Pulse: 103 87   Patient Position - Orthostatic VS: Sitting        Physical Exam  Vitals and nursing note reviewed.   HENT:      Head: Normocephalic.      Mouth/Throat:      Pharynx: Oropharynx is clear.   Eyes:      Pupils: Pupils are equal, round, and reactive to light.   Cardiovascular:      Rate and Rhythm: Normal rate and regular rhythm.      Heart sounds: Normal heart sounds.   Pulmonary:      Effort: Pulmonary effort is normal.   Abdominal:      General: Abdomen is flat. Bowel sounds are normal.      Palpations: Abdomen is soft.      Tenderness: There is abdominal tenderness in the right upper quadrant and epigastric area.   Skin:     General: Skin is warm.      Capillary Refill: Capillary refill takes less than 2 seconds.   Neurological:      General: No focal deficit present.      Mental Status: She is alert.   Psychiatric:         Mood and Affect: Mood is anxious.      Comments: Tearful          ED Medications  Medications   ondansetron (ZOFRAN) injection 4 mg (4 mg Intravenous Given 8/19/24 1359)   sodium chloride 0.9 % bolus 1,000 mL (1,000 mL Intravenous New Bag 8/19/24 1425)   diphenhydrAMINE (BENADRYL) injection 25 mg (25 mg Intravenous Given 8/19/24 1358)   Famotidine (PF) (PEPCID) injection 20 mg (20 mg Intravenous Given 8/19/24 1402)    methylPREDNISolone sodium succinate (Solu-MEDROL) injection 80 mg (80 mg Intravenous Given 8/19/24 1402)       Diagnostic Studies  Results Reviewed       Procedure Component Value Units Date/Time    HS Troponin I 2hr [[id number]] Collected: 08/19/24 1556    Lab Status: In process Specimen: Blood from Arm, Left Updated: 08/19/24 1610    HS Troponin I 4hr [[id number]]     Lab Status: No result Specimen: Blood     Urine Microscopic [[id number]] Collected: 08/19/24 1438    Lab Status: Final result Specimen: Urine, Clean Catch Updated: 08/19/24 1521     RBC, UA 0-1 /hpf      WBC, UA 1-2 /hpf      Epithelial Cells Occasional /hpf      Bacteria, UA Occasional /hpf      URINE COMMENT SOME SQUAMOUS EPITHELIUM SEEN IN SHEETS    UA w Reflex to Microscopic w Reflex to Culture [[id number]]  (Abnormal) Collected: 08/19/24 1438    Lab Status: Final result Specimen: Urine, Clean Catch Updated: 08/19/24 1453     Color, UA Yellow     Clarity, UA Clear     Specific Gravity, UA 1.010     pH, UA 6.5     Leukocytes, UA Negative     Nitrite, UA Negative     Protein, UA Negative mg/dl      Glucose, UA Negative mg/dl      Ketones, UA Negative mg/dl      Urobilinogen, UA <2.0 mg/dl      Bilirubin, UA Negative     Occult Blood, UA Trace    HS Troponin 0hr (reflex protocol) [[id number]]  (Abnormal) Collected: 08/19/24 1346    Lab Status: Final result Specimen: Blood from Arm, Left Updated: 08/19/24 1428     hs TnI 0hr 54 ng/L     CMP [[id number]] Collected: 08/19/24 1346    Lab Status: Final result Specimen: Blood from Arm, Left Updated: 08/19/24 1422     Sodium 138 mmol/L      Potassium 3.9 mmol/L      Chloride 102 mmol/L      CO2 26 mmol/L      ANION GAP 10 mmol/L      BUN 7 mg/dL      Creatinine 0.84 mg/dL      Glucose 94 mg/dL      Calcium 8.8 mg/dL      AST 20 U/L      ALT 25 U/L      Alkaline Phosphatase 71 U/L      Total Protein 7.1 g/dL      Albumin 3.9 g/dL      Total Bilirubin 0.51 mg/dL      eGFR 80 ml/min/1.73sq m     Narrative:       National Kidney Disease Foundation guidelines for Chronic Kidney Disease (CKD):     Stage 1 with normal or high GFR (GFR > 90 mL/min/1.73 square meters)    Stage 2 Mild CKD (GFR = 60-89 mL/min/1.73 square meters)    Stage 3A Moderate CKD (GFR = 45-59 mL/min/1.73 square meters)    Stage 3B Moderate CKD (GFR = 30-44 mL/min/1.73 square meters)    Stage 4 Severe CKD (GFR = 15-29 mL/min/1.73 square meters)    Stage 5 End Stage CKD (GFR <15 mL/min/1.73 square meters)  Note: GFR calculation is accurate only with a steady state creatinine    Lipase [[id number]]  (Normal) Collected: 08/19/24 1346    Lab Status: Final result Specimen: Blood from Arm, Left Updated: 08/19/24 1422     Lipase 12 u/L     Magnesium [[id number]]  (Normal) Collected: 08/19/24 1346    Lab Status: Final result Specimen: Blood from Arm, Left Updated: 08/19/24 1422     Magnesium 2.2 mg/dL     C-reactive protein [[id number]]  (Abnormal) Collected: 08/19/24 1346    Lab Status: Final result Specimen: Blood from Arm, Left Updated: 08/19/24 1419     CRP 20.4 mg/L     CBC and differential [[id number]]  (Abnormal) Collected: 08/19/24 1346    Lab Status: Final result Specimen: Blood from Arm, Left Updated: 08/19/24 1403     WBC 8.86 Thousand/uL      RBC 4.77 Million/uL      Hemoglobin 15.2 g/dL      Hematocrit 45.8 %      MCV 96 fL      MCH 31.9 pg      MCHC 33.2 g/dL      RDW 13.2 %      MPV 8.7 fL      Platelets 449 Thousands/uL      nRBC 0 /100 WBCs      Segmented % 66 %      Immature Grans % 0 %      Lymphocytes % 19 %      Monocytes % 8 %      Eosinophils Relative 6 %      Basophils Relative 1 %      Absolute Neutrophils 5.80 Thousands/µL      Absolute Immature Grans 0.03 Thousand/uL      Absolute Lymphocytes 1.67 Thousands/µL      Absolute Monocytes 0.73 Thousand/µL      Eosinophils Absolute 0.51 Thousand/µL      Basophils Absolute 0.12 Thousands/µL     Clostridium difficile toxin by PCR with EIA [[id number]]     Lab Status: No result Specimen: Stool     Stool  Enteric Bacterial Panel by PCR [[id number]]     Lab Status: No result Specimen: Stool     Calprotectin,Fecal [[id number]]     Lab Status: No result Specimen: Stool                    CT abdomen pelvis without contrast   Final Result by Johnnie Carey MD (08/19 1447)      Findings suggestive of proctocolitis.   Bilateral nephrolithiasis.   Hepatomegaly.      The study was marked in EPIC for immediate notification.      Workstation performed: [id number]               Procedures  Procedures      ED Course                             SBIRT 20yo+      Flowsheet Row Most Recent Value   Initial Alcohol Screen: US AUDIT-C     1. How often do you have a drink containing alcohol? 0 Filed at: 08/19/2024 1146   2. How many drinks containing alcohol do you have on a typical day you are drinking?  0 Filed at: 08/19/2024 1146   3a. Male UNDER 65: How often do you have five or more drinks on one occasion? 0 Filed at: 08/19/2024 1146   3b. FEMALE Any Age, or MALE 65+: How often do you have 4 or more drinks on one occassion? 0 Filed at: 08/19/2024 1146   Audit-C Score 0 Filed at: 08/19/2024 1146   LAUREL: How many times in the past year have you...    Used an illegal drug or used a prescription medication for non-medical reasons? Never Filed at: 08/19/2024 1146                  Medical Decision Making  Celine Roa is a 49 y.o. female with hereditary alpha tryptase EMEA, Hashimoto's, IBD likely Crohn's, diagnosis of mast cell activation syndrome from [facility], anti-TNF induced lupus,came to ED for bloody diarrhea for 2 weeks.   Diarrhea started 2 weeks ago, initially watery and started to notice blood in her bowels.  She has been having fever, and epigastric pain with vomitings and diarrhea. C/o feeling restless, and sleeping disturbances.   In the ED,   CT abdomen: Findings suggestive of proctocolitis.  Bilateral nephrolithiasis <3mm stone. No hydronephrosis.   Hepatomegaly.       D/D: IBD flare,  Duodenitis    Plan:  Labs: CRP:20, elevated 0 hr troponin 54.  Follow up with 2 hr and 4 hr troponins.   EKG: low voltage QRS. NSR. HR: 87  IL Nacl bolus   IV benadryl, Pepcid, methyl prednisone 80 mg given.  NO BM and vomiting since admission.   Reached out to [facility], will do a trial of Clear liquid diet and see how she tolerates.   At the end of my shift, pt was signed out to Dr. Lopez.       Amount and/or Complexity of Data Reviewed  Labs: ordered.  Radiology: ordered.    Risk  Prescription drug management.          Disposition  Final diagnoses:   None     ED Disposition       None          Follow-up Information    None         Patient's Medications   Discharge Prescriptions    No medications on file     No discharge procedures on file.    PDMP Review         Value Time User    PDMP Reviewed  Yes 2/15/2024  1:30 AM JONATHAN Dumont             ED Provider  Attending physically available and evaluated Celine Roa. I managed the patient along with the ED Attending.    Electronically Signed by           Kimmy Carlin MD  08/19/24 0586       Kimmy Carlin MD  08/19/24 3228       Kimmy Carlin MD  08/19/24 2109

## 2024-08-19 NOTE — PLAN OF CARE
Problem: PAIN - ADULT  Goal: Verbalizes/displays adequate comfort level or baseline comfort level  Description: Interventions:  - Encourage patient to monitor pain and request assistance  - Assess pain using appropriate pain scale  - Administer analgesics based on type and severity of pain and evaluate response  - Implement non-pharmacological measures as appropriate and evaluate response  - Consider cultural and social influences on pain and pain management  - Notify physician/advanced practitioner if interventions unsuccessful or patient reports new pain  Outcome: Progressing     Problem: INFECTION - ADULT  Goal: Absence or prevention of progression during hospitalization  Description: INTERVENTIONS:  - Assess and monitor for signs and symptoms of infection  - Monitor lab/diagnostic results  - Monitor all insertion sites, i.e. indwelling lines, tubes, and drains  - Monitor endotracheal if appropriate and nasal secretions for changes in amount and color  - Parsons appropriate cooling/warming therapies per order  - Administer medications as ordered  - Instruct and encourage patient and family to use good hand hygiene technique  - Identify and instruct in appropriate isolation precautions for identified infection/condition  Outcome: Progressing  Goal: Absence of fever/infection during neutropenic period  Description: INTERVENTIONS:  - Monitor WBC    Outcome: Progressing     Problem: SAFETY ADULT  Goal: Patient will remain free of falls  Description: INTERVENTIONS:  - Educate patient/family on patient safety including physical limitations  - Instruct patient to call for assistance with activity   - Consult OT/PT to assist with strengthening/mobility   - Keep Call bell within reach  - Keep bed low and locked with side rails adjusted as appropriate  - Keep care items and personal belongings within reach  - Initiate and maintain comfort rounds  - Make Fall Risk Sign visible to staff  - Offer Toileting every 2 Hours,  in advance of need  - Initiate/Maintain comfort  rounds  - Obtain necessary fall risk management equipment: call bell  - Apply yellow socks and bracelet for high fall risk patients  - Consider moving patient to room near nurses station  Outcome: Progressing     Problem: DISCHARGE PLANNING  Goal: Discharge to home or other facility with appropriate resources  Description: INTERVENTIONS:  - Identify barriers to discharge w/patient and caregiver  - Arrange for needed discharge resources and transportation as appropriate  - Identify discharge learning needs (meds, wound care, etc.)  - Arrange for interpretive services to assist at discharge as needed  - Refer to Case Management Department for coordinating discharge planning if the patient needs post-hospital services based on physician/advanced practitioner order or complex needs related to functional status, cognitive ability, or social support system  Outcome: Progressing     Problem: Knowledge Deficit  Goal: Patient/family/caregiver demonstrates understanding of disease process, treatment plan, medications, and discharge instructions  Description: Complete learning assessment and assess knowledge base.  Interventions:  - Provide teaching at level of understanding  - Provide teaching via preferred learning methods  Outcome: Progressing     Problem: GASTROINTESTINAL - ADULT  Goal: Minimal or absence of nausea and/or vomiting  Description: INTERVENTIONS:  - Administer IV fluids if ordered to ensure adequate hydration  - Maintain NPO status until nausea and vomiting are resolved  - Nasogastric tube if ordered  - Administer ordered antiemetic medications as needed  - Provide nonpharmacologic comfort measures as appropriate  - Advance diet as tolerated, if ordered  - Consider nutrition services referral to assist patient with adequate nutrition and appropriate food choices  Outcome: Progressing  Goal: Maintains or returns to baseline bowel function  Description:  INTERVENTIONS:  - Assess bowel function  - Encourage oral fluids to ensure adequate hydration  - Administer IV fluids if ordered to ensure adequate hydration  - Administer ordered medications as needed  - Encourage mobilization and activity  - Consider nutritional services referral to assist patient with adequate nutrition and appropriate food choices  Outcome: Progressing  Goal: Maintains adequate nutritional intake  Description: INTERVENTIONS:  - Monitor percentage of each meal consumed  - Identify factors contributing to decreased intake, treat as appropriate  - Assist with meals as needed  - Monitor I&O, weight, and lab values if indicated  - Obtain nutrition services referral as needed  Outcome: Progressing

## 2024-08-19 NOTE — TELEPHONE ENCOUNTER
Last OV: 6/5/24   Hx: Crohns     Pt calling in, reports she thinks she is having a Crohn's flare for the last two weeks. She is having more constipation now and is concerned for a bowel blockage.   She is vomiting the past three days 1-2 x a day with severe nausea. RUQ abdominal pain 5-6/10. She has a fever on Saturday as well but none currently.   She has not had a BM in a few days and passing very little gas. She has urge to have a BM but can't and is only passing water and blood.  Pt reports was having diarrhea two weeks ago and then stopped. Last regular formed stool was 2 weeks ago.     She tried to go to infusion center for IV fluids but they could not get an IV started so patient left.   Pt is not on any IBD meds.     She would like to be evaluated in the ED and will be going to [facility].       Reason for Disposition   The patient is unable to pass stool and gas.    Answer Questions - Initial Assessment   Are you experiencing abdominal pain: Yes - When did the pain start: unknown  Where is the pain located: RUQ  Does it radiate and where:   Does the patient recall any inciting factors:   Any aggrevating factors: nothing  Any alleviating factors: nothing  Is the pain: Constant  Pain scale: 5    What is the consistency of your stool: no BM     If diarrhea, has there been any recent antibiotic use: n/a     If constipation, when was your last bm 2 weeks ago formed and since then looser stools and Friday started with water and blood , are you passing gas: a little not much     How many bowel movements are you having within a 24 hour period: none     Do you see any blood or mucus in your stool and if so how much are you seeing: yes blood     Do you have a history of hemorrhoids or anal fissures: unsure     Do you have increased urgency: No    Do you have nausea or vomiting and if so how often and what consistency: yes-     What IBD medications are you taking and are you up to date with the past 3 doses: not on  any     Have you had any recent diet changes:     Have you had any recent travel, sick contacts, or recent hospitalizations:     Have you had any recent heightened stressors:     Do you have any fever, chills, sweats, joint pain, rashes: fever on Saturday night     Do you have any other concerns: obstruction    Protocols used: IBD

## 2024-08-19 NOTE — ED NOTES
Multiple attempts made by staff to obtain IV access without success, provider made aware.     Sarah Burrows, RN  08/19/24 5552

## 2024-08-19 NOTE — ED ATTENDING ATTESTATION
8/19/2024  I, Grzegorz Lopez DO, saw and evaluated the patient. I have discussed the patient with the resident/non-physician practitioner and agree with the resident's/non-physician practitioner's findings, Plan of Care, and MDM as documented in the resident's/non-physician practitioner's note, except where noted. All available labs and Radiology studies were reviewed.  I was present for key portions of any procedure(s) performed by the resident/non-physician practitioner and I was immediately available to provide assistance.       At this point I agree with the current assessment done in the Emergency Department.  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     Patient describes 2 week hx of crohn's flare. States went for IVF infusion 4 times a week ago, last visit they were unable to establish IV access despite attempts and went home reports ongoing poor PO intake, nausea, vomiting (non-bloody nonbilious), liquid/loose stools with BRBPR which have decreased to no BM in 1-2 days and she is concerned about obstruction.     Reports mast cell disease is also acting up, further limiting her ability to tolerate PO at home. States she is allergic to corn and it is corn season right now and PO meds contain corn.     Called GI triage line informed them of ongoing symptoms and was advised to seek ED eval.     ED Course  ED Course as of 08/19/24 2013   Mon Aug 19, 2024   1357 EKG interpreted by myself.  EKG dated 8/19/2024 1305 demonstrates sinus rhythm with occasional PVCs at 90 bpm, normal IL, QRS, QTc durations, no STEMI.   1434 hs TnI 0hr(!): 54   1434 C-REACTIVE PROTEIN(!): 20.4   1631 This was discussed with Slim and  gastroenterology on-call with thoughts towards admitting this patient due to her symptoms.  Patient is pending 2-hour troponin.  GI and Slim did not feel the patient needed to be admitted and less second opponent is elevated or patient could not tolerate p.o.  I personally informed nurse of the plan to proceed with a clear liquid p.o. trial and will reassess shortly for tolerance.     Review of Systems   Constitutional:  Positive for activity change, appetite change and fatigue. Negative for chills, fever and unexpected weight change.   HENT:  Negative for facial swelling and sore throat.    Respiratory:  Negative for chest tightness, shortness of breath, wheezing and stridor.    Cardiovascular:  Negative for chest pain, palpitations and leg swelling.   Gastrointestinal:  Positive for abdominal pain, anal bleeding, blood in stool, constipation, diarrhea, nausea, rectal pain and vomiting. Negative for abdominal distention.   Genitourinary:  Negative for difficulty urinating and flank pain.   Musculoskeletal:  Negative for back pain and neck pain.   Skin:  Negative for rash.   Allergic/Immunologic: Positive for environmental allergies and immunocompromised state.     Physical Exam  Vitals and nursing note reviewed. Exam conducted with a chaperone present.   Constitutional:       General: She is not in acute distress.     Appearance: She is well-developed. She is ill-appearing. She is not toxic-appearing or diaphoretic.   HENT:      Head: Normocephalic and atraumatic.      Mouth/Throat:      Mouth: Mucous membranes are moist. No angioedema.      Pharynx: Oropharynx is clear.   Eyes:      General: No scleral icterus.  Cardiovascular:      Rate and Rhythm: Normal rate and regular rhythm.   Pulmonary:      Effort: Pulmonary effort is normal.      Breath sounds: Normal breath sounds and air entry. No stridor, decreased air movement or transmitted upper airway sounds. No wheezing.   Abdominal:      General: Abdomen is flat.  There is no distension.      Palpations: Abdomen is soft.      Tenderness: There is no abdominal tenderness. There is no guarding or rebound. Negative signs include Mejia's sign.   Skin:     General: Skin is warm and dry.      Capillary Refill: Capillary refill takes less than 2 seconds.      Coloration: Skin is not cyanotic, jaundiced or pale.   Neurological:      General: No focal deficit present.      Mental Status: She is alert.           Critical Care Time  US Guided Peripheral IV    Date/Time: 8/19/2024 1:57 PM    Performed by: Grzegorz Lopez DO  Authorized by: Grzegorz Lopez DO    Patient location:  ED  Performed by:  Attending  Indications:     Indications: difficulty obtaining IV access      Image availability:  Not saved  Procedure details:     Patient evaluated for contraindications to access (i.e. fistula, thrombosis, etc): Yes      Standard clean technique used for ultrasound access: Yes      Catheter size:  20 gauge    Number of attempts:  2    Successful placement: yes    Post-procedure details:     Assessment: free fluid flow and no signs of infiltration      Post-procedure complications: none      Patient tolerance of procedure:  Tolerated well, no immediate complications    Medical Decision Making  Patient presents from home with GI call in note stating coming to ED for crohn's flare. On arrival patient appears ill, nontoxic, dehydrated.     Differential Diagnosis includes crohn's IBD flare, acute abdomen, choledoco, pancreatitis, enteritis, gastritis, colitis, c diff, functional abdominal pain syndrome, uti/pyelo    Difficulty with IV access, US line placed by myself, labs drawn. While attending to other patient care responsibilities notified patient was complaining of throat tightness, itching, reportedly had raspy voice per staff. Ordered IV steroid, as well as antihistamines for reported symptoms, however on assessment patient not noted to have any objective allergic exam  findings.     CT and labs ordered. CT shows proctocolitis, mild. Labs essentially normal however initial troponin elevated, resolved on repeat.  Patient clinically stable no vomiting. No requests for pains meds during ED course.     Reassessment patient feels she cannot go home. States she needs to be admitted as usual for bowel rest, IVF, for a few days. States she failed OP mgmt and infusions. D/w GI and SLIM, request PO trial for evidence of intolerance/admission criteria given overall reassuring workup. Patient tolerated water but now describing new, worsening pain with further attempts to sip. Reengaged [facility], will place in observation. GI aware.     Patient with complex medical hx including autoimmune diagnoses, inflammatory bowel disease, psychosomatic and psychiatric conditions. Not entirely clear - now or on a chronic basis - to what degree her symptoms are associated with organic vs non-organic (functional) pathology. Given objective findings of colitis on workup, subjective complaints, difficulty with outpatient mgmt and infusions I think it is reasonable to proceed with inpatient observation. GI is available in-house tomorrow for consultation.     Problems Addressed:  Crohn's disease (HCC): acute illness or injury  Proctocolitis: acute illness or injury    Amount and/or Complexity of Data Reviewed  External Data Reviewed: labs, radiology, ECG and notes.  Labs: ordered. Decision-making details documented in ED Course.  Radiology: ordered.  ECG/medicine tests: ordered and independent interpretation performed. Decision-making details documented in ED Course.    Risk  Prescription drug management.  Decision regarding hospitalization.  Diagnosis or treatment significantly limited by social determinants of health.

## 2024-08-20 ENCOUNTER — HOSPITAL ENCOUNTER (OUTPATIENT)
Dept: INFUSION CENTER | Facility: HOSPITAL | Age: 49
Discharge: HOME | End: 2024-08-20

## 2024-08-20 PROBLEM — R10.84 GENERALIZED ABDOMINAL PAIN: Status: ACTIVE | Noted: 2024-08-20

## 2024-08-20 LAB
ALBUMIN SERPL BCG-MCNC: 3.9 G/DL (ref 3.5–5)
ALP SERPL-CCNC: 68 U/L (ref 34–104)
ALT SERPL W P-5'-P-CCNC: 22 U/L (ref 7–52)
ANION GAP SERPL CALCULATED.3IONS-SCNC: 10 MMOL/L (ref 4–13)
AST SERPL W P-5'-P-CCNC: 16 U/L (ref 13–39)
BILIRUB SERPL-MCNC: 0.4 MG/DL (ref 0.2–1)
BUN SERPL-MCNC: 5 MG/DL (ref 5–25)
CALCIUM SERPL-MCNC: 8.2 MG/DL (ref 8.4–10.2)
CHLORIDE SERPL-SCNC: 106 MMOL/L (ref 96–108)
CO2 SERPL-SCNC: 22 MMOL/L (ref 21–32)
CREAT SERPL-MCNC: 0.6 MG/DL (ref 0.6–1.3)
GFR SERPL CREATININE-BSD FRML MDRD: 105 ML/MIN/1.73SQ M
GLUCOSE SERPL-MCNC: 137 MG/DL (ref 65–140)
POTASSIUM SERPL-SCNC: 3.9 MMOL/L (ref 3.5–5.3)
PROT SERPL-MCNC: 6.9 G/DL (ref 6.4–8.4)
SODIUM SERPL-SCNC: 138 MMOL/L (ref 135–147)

## 2024-08-20 PROCEDURE — 80053 COMPREHEN METABOLIC PANEL: CPT | Performed by: INTERNAL MEDICINE

## 2024-08-20 PROCEDURE — 87505 NFCT AGENT DETECTION GI: CPT | Performed by: INTERNAL MEDICINE

## 2024-08-20 PROCEDURE — 99232 SBSQ HOSP IP/OBS MODERATE 35: CPT

## 2024-08-20 PROCEDURE — 99222 1ST HOSP IP/OBS MODERATE 55: CPT | Performed by: STUDENT IN AN ORGANIZED HEALTH CARE EDUCATION/TRAINING PROGRAM

## 2024-08-20 PROCEDURE — 83993 ASSAY FOR CALPROTECTIN FECAL: CPT | Performed by: INTERNAL MEDICINE

## 2024-08-20 RX ORDER — DIPHENHYDRAMINE HYDROCHLORIDE 50 MG/ML
50 INJECTION INTRAMUSCULAR; INTRAVENOUS EVERY 6 HOURS PRN
Status: DISCONTINUED | OUTPATIENT
Start: 2024-08-20 | End: 2024-08-20

## 2024-08-20 RX ORDER — DIPHENHYDRAMINE HYDROCHLORIDE 50 MG/ML
50 INJECTION INTRAMUSCULAR; INTRAVENOUS EVERY 4 HOURS PRN
Status: DISCONTINUED | OUTPATIENT
Start: 2024-08-20 | End: 2024-08-21 | Stop reason: HOSPADM

## 2024-08-20 RX ORDER — FAMOTIDINE 10 MG/ML
20 INJECTION, SOLUTION INTRAVENOUS EVERY 12 HOURS SCHEDULED
Status: DISCONTINUED | OUTPATIENT
Start: 2024-08-20 | End: 2024-08-21 | Stop reason: HOSPADM

## 2024-08-20 RX ADMIN — DIPHENHYDRAMINE HYDROCHLORIDE 50 MG: 50 INJECTION, SOLUTION INTRAMUSCULAR; INTRAVENOUS at 13:23

## 2024-08-20 RX ADMIN — SODIUM CHLORIDE, SODIUM GLUCONATE, SODIUM ACETATE, POTASSIUM CHLORIDE, MAGNESIUM CHLORIDE, SODIUM PHOSPHATE, DIBASIC, AND POTASSIUM PHOSPHATE 125 ML/HR: .53; .5; .37; .037; .03; .012; .00082 INJECTION, SOLUTION INTRAVENOUS at 21:51

## 2024-08-20 RX ADMIN — DIPHENHYDRAMINE HYDROCHLORIDE 50 MG: 50 INJECTION, SOLUTION INTRAMUSCULAR; INTRAVENOUS at 00:13

## 2024-08-20 RX ADMIN — DIPHENHYDRAMINE HYDROCHLORIDE 50 MG: 50 INJECTION, SOLUTION INTRAMUSCULAR; INTRAVENOUS at 17:29

## 2024-08-20 RX ADMIN — FAMOTIDINE 20 MG: 10 INJECTION, SOLUTION INTRAVENOUS at 08:27

## 2024-08-20 RX ADMIN — METHYLPREDNISOLONE SODIUM SUCCINATE 40 MG: 40 INJECTION, POWDER, FOR SOLUTION INTRAMUSCULAR; INTRAVENOUS at 08:27

## 2024-08-20 RX ADMIN — FAMOTIDINE 20 MG: 10 INJECTION, SOLUTION INTRAVENOUS at 21:51

## 2024-08-20 RX ADMIN — DIPHENHYDRAMINE HYDROCHLORIDE 50 MG: 50 INJECTION, SOLUTION INTRAMUSCULAR; INTRAVENOUS at 21:51

## 2024-08-20 RX ADMIN — BUTALBITAL, ACETAMINOPHEN, AND CAFFEINE 1 TABLET: 50; 325; 40 TABLET ORAL at 00:13

## 2024-08-20 RX ADMIN — DIPHENHYDRAMINE HYDROCHLORIDE 50 MG: 50 INJECTION, SOLUTION INTRAMUSCULAR; INTRAVENOUS at 08:27

## 2024-08-20 RX ADMIN — SODIUM CHLORIDE, SODIUM GLUCONATE, SODIUM ACETATE, POTASSIUM CHLORIDE, MAGNESIUM CHLORIDE, SODIUM PHOSPHATE, DIBASIC, AND POTASSIUM PHOSPHATE 125 ML/HR: .53; .5; .37; .037; .03; .012; .00082 INJECTION, SOLUTION INTRAVENOUS at 13:23

## 2024-08-20 NOTE — ASSESSMENT & PLAN NOTE
48 yo female PMHx mast cell disease, corn allergy, GERD, IBS-D, Crohn on IV infusions presents with abdominal pain, rectal bleeding,  diarrhea and decreased oral intake in setting of Crohn's flare with proctocolitis   History of Crohn's disease on Skyrizi as well as methotrexate  CT abd/pelvis: findings suggestive of proctocolitis  H&H stable. No further rectal bleeding or BM since admission  Appreciate GI consult  Diet advanced to surgical soft for lunch  Continue IVF hydration  Continue IV steroids, IV Benadryl   Stool studies ordered

## 2024-08-20 NOTE — CASE MANAGEMENT
Case Management Progress Note    Patient name Celine Roa  Location /404-01 MRN [id number]  : 1975 Date 2024       LOS (days): 0  Geometric Mean LOS (GMLOS) (days):   Days to GMLOS:        OBJECTIVE:        Current admission status: Observation  Preferred Pharmacy:   [facility] [address]  [address]  [place] [address]  Phone: [phone] Fax: [phone]    [facility] - [place] - [address]  [address] [place] [address]  Phone: [phone] Fax: [phone]    [facility] [address]  [address] [place] [address]  Phone: [phone] Fax: [phone]    [facility] [place] [address] [facility],  [address] [facility]  [address] [facility] [place] [address]  Phone: [phone] Fax: [phone]    Primary Care Provider: Olaf Rhodes MD    Primary Insurance: Cuedd  Secondary Insurance: MEDICARE    PROGRESS NOTE:chart reviewed, no current discharge needs noted. CM to follow.

## 2024-08-20 NOTE — NURSING NOTE
Patient refused levothyroxine medication this morning. Said she does not take it while in the hospital. Provider made aware

## 2024-08-20 NOTE — UTILIZATION REVIEW
Initial Clinical Review    Admission: Date/Time/Statement:   Admission Orders (From admission, onward)       Ordered        08/19/24 1730  Place in Observation  Once                          Orders Placed This Encounter   Procedures    Place in Observation     Standing Status:   Standing     Number of Occurrences:   1     Order Specific Question:   Level of Care     Answer:   Med Surg [16]     ED Arrival Information       Expected   -    Arrival   8/19/2024 11:29    Acuity   Urgent              Means of arrival   Walk-In    Escorted by   Self    Service   Hospitalist    Admission type   Emergency              Arrival complaint   Inflammatory Bowel Disease             Chief Complaint   Patient presents with    Abdominal Pain     Patient reports hx of Crohn. States flare up the past two weeks. Vomiting x1 week. Also reports diarrhea and blood in stool       Initial Presentation: 49 y.o. female presents to ed from home on 8-19-24 for evaluation and treatment of blood in stool and vomiting non bloody non bilious. Patient states this is likely a Chron's flare up with onset 2 weeks ago. PMHX:  CHRON's, psychosomatic condition.   Clinical assessment significant for abdominal pain 5/10.Imaging shows proctocolitis .  Troponin 54, Ekg nsr.  Initially treated with iv zofran x1, iv .9% ns 1L bolus x2, iv benadryl, iv pepcid.  Admit to observation for proctitis and abdominal pain.  Plan to consult GI, start iv steroids, bowel rest, analgesia, iv pepcid,iv fluids.        Date: 8-20-24    Day 2: observation  Current Patient Status: Observation   Certification Statement: The patient will continue to require additional inpatient hospital stay due to diet advancement, IV steroids ,IV fluid hydration, serial monitoring     GI consult completed. On exam abdomen soft, non distended, no significant ttp.  Advance from clear liquid diet breakfast and lunch then start soft surgical diet.  Continue iv fluids, iv pepcid and iv solumedrol.       ED Triage Vitals [08/19/24 1145]   Temperature Pulse Respirations Blood Pressure SpO2 Pain Score   (!) 97.4 °F (36.3 °C) 103 16 126/84 97 % 5     Weight (last 2 days)       Date/Time Weight    08/19/24 19:11:06 74.7 (164.68)    08/19/24 1145 74.4 (164)            Vital Signs (last 3 days)       Date/Time Temp Pulse Resp BP MAP (mmHg) SpO2 O2 Device Pain    08/20/24 1421 -- -- -- -- -- -- -- 8 08/20/24 11:44:18 98 °F (36.7 °C) 90 18 119/89 99 94 % -- --    08/20/24 07:40:48 97.6 °F (36.4 °C) 70 15 120/84 96 99 % -- --    08/20/24 03:23:37 97.5 °F (36.4 °C) 72 18 95/60 72 97 % -- --    08/20/24 03:22:54 97.5 °F (36.4 °C) 70 18 95/60 72 97 % -- --    08/19/24 23:16:16 97.7 °F (36.5 °C) 96 20 127/86 100 97 % -- --    08/19/24 2018 -- -- -- -- -- -- None (Room air) 8    08/19/24 2003 -- -- -- -- -- -- -- 8 08/19/24 1932 -- -- -- -- -- -- -- 8 08/19/24 19:11:06 98.3 °F (36.8 °C) 107 20 119/76 90 98 % -- --    08/19/24 1835 -- 102 22 135/87 -- 98 % None (Room air) --    08/19/24 1830 -- 122 38 138/102 116 98 % None (Room air) --    08/19/24 1743 -- 98 23 -- -- 95 % -- --    08/19/24 1713 -- 112 68 -- -- 95 % -- --    08/19/24 1643 -- 97 26 -- -- 94 % -- --    08/19/24 1500 -- 87 17 112/75 90 97 % None (Room air) --    08/19/24 1145 97.4 °F (36.3 °C) 103 16 126/84 -- 97 % None (Room air) 5              Pertinent Labs/Diagnostic Test Results:   Radiology:  CT abdomen pelvis without contrast   Final 08/19 1447)      Findings suggestive of proctocolitis.   Bilateral nephrolithiasis.   Hepatomegaly.     Cardiology:  No orders to display     GI:  No orders to display       Results from last 7 days   Lab Units 08/19/24  1913   SARS-COV-2  Negative     Results from last 7 days   Lab Units 08/19/24  1346   WBC Thousand/uL 8.86   HEMOGLOBIN g/dL 15.2   HEMATOCRIT % 45.8   PLATELETS Thousands/uL 449*   TOTAL NEUT ABS Thousands/µL 5.80         Results from last 7 days   Lab Units 08/20/24  0505 08/19/24  1346   SODIUM  mmol/L 138 138   POTASSIUM mmol/L 3.9 3.9   CHLORIDE mmol/L 106 102   CO2 mmol/L 22 26   ANION GAP mmol/L 10 10   BUN mg/dL 5 7   CREATININE mg/dL 0.60 0.84   EGFR ml/min/1.73sq m 105 80   CALCIUM mg/dL 8.2* 8.8   MAGNESIUM mg/dL  --  2.2     Results from last 7 days   Lab Units 08/20/24  0505 08/19/24  1346   AST U/L 16 20   ALT U/L 22 25   ALK PHOS U/L 68 71   TOTAL PROTEIN g/dL 6.9 7.1   ALBUMIN g/dL 3.9 3.9   TOTAL BILIRUBIN mg/dL 0.40 0.51         Results from last 7 days   Lab Units 08/20/24  0505 08/19/24  1346   GLUCOSE RANDOM mg/dL 137 94       Results from last 7 days   Lab Units 08/19/24  1838 08/19/24  1556 08/19/24  1346   HS TNI 0HR ng/L  --   --  54*   HS TNI 2HR ng/L  --  <2  --    HSTNI D2 ng/L  --  <-52  --    HS TNI 4HR ng/L <2  --   --    HSTNI D4 ng/L <-52  --   --        Results from last 7 days   Lab Units 08/19/24  1346   LIPASE u/L 12     Results from last 7 days   Lab Units 08/19/24  1346   CRP mg/L 20.4*     Results from last 7 days   Lab Units 08/19/24  2046 08/19/24  1438   CLARITY UA  Clear Clear   COLOR UA  Colorless Yellow   SPEC GRAV UA  1.010 1.010   PH UA  5.5 6.5   GLUCOSE UA mg/dl 300 (3/10%)* Negative   KETONES UA mg/dl 40 (2+)* Negative   BLOOD UA  Trace* Trace*   PROTEIN UA mg/dl Negative Negative   NITRITE UA  Negative Negative   BILIRUBIN UA  Negative Negative   UROBILINOGEN UA (BE) mg/dl <2.0 <2.0   LEUKOCYTES UA  Negative Negative   WBC UA /hpf 0-1* 1-2   RBC UA /hpf 0-5* 0-1   BACTERIA UA /hpf None Seen Occasional   EPITHELIAL CELLS WET PREP /hpf Occasional Occasional     Results from last 7 days   Lab Units 08/19/24  1913   INFLUENZA A PCR  Negative   INFLUENZA B PCR  Negative   RSV PCR  Negative         Results from last 7 days   Lab Units 08/19/24 2046   AMPH/METH  Negative   BARBITURATE UR  Negative   BENZODIAZEPINE UR  Negative   COCAINE UR  Negative   METHADONE URINE  Negative   OPIATE UR  Negative   PCP UR  Negative   THC UR  Negative         ED  Treatment-Medication Administration from 08/19/2024 1057 to 08/19/2024 1853         Date/Time Order Dose Route Action     08/19/2024 1359 ondansetron (ZOFRAN) injection 4 mg 4 mg Intravenous Given     08/19/2024 1425 sodium chloride 0.9 % bolus 1,000 mL 1,000 mL Intravenous New Bag     08/19/2024 1358 diphenhydrAMINE (BENADRYL) injection 25 mg 25 mg Intravenous Given     08/19/2024 1402 Famotidine (PF) (PEPCID) injection 20 mg 20 mg Intravenous Given     08/19/2024 1402 methylPREDNISolone sodium succinate (Solu-MEDROL) injection 80 mg 80 mg Intravenous Given     08/19/2024 1834 EPINEPHrine PF (ADRENALIN) 1 mg/mL injection 0.5 mg 0.5 mg Intramuscular Given     08/19/2024 1832 multi-electrolyte (ISOLYTE-S PH 7.4) bolus 1,000 mL 1,000 mL Intravenous New Bag            Past Medical History:   Diagnosis Date    Anemia 2007    Anxiety     Asthma     Bile duct stricture 2014    Sphincter of oddi dysfunction    Chronic kidney disease     Closed bimalleolar fracture 02/22/2024    Colon polyp 1998    Crohn's colitis (HCC)     Cyst of ovary 02/22/2024    Deep vein thrombosis (HCC)     Disease of thyroid gland     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 pt denies, documented in cardio note    Pain of right thigh 02/13/2023    Pancreatitis     sphinger of     Psychiatric disorder     RA (rheumatoid arthritis) (HCC)     Seizures (HCC)     Sepsis without acute organ dysfunction (HCC)     SIRS (systemic inflammatory response syndrome) (HCC)     Thrombocytosis 04/05/2022    Ulcerative colitis (HCC)     Visual impairment      Present on Admission:   Crohn disease (HCC)   Depression due to physical illness    Proctocolitis   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HCC)      Admitting Diagnosis:     Proctocolitis [K52.9]  Crohn's disease (HCC) [K50.90]  Abdominal pain [R10.9]    Age/Sex: 49 y.o. female    Scheduled Medications:    famotidine, 20 mg, Intravenous, Q12H KARISSA  methylPREDNISolone sodium succinate, 40 mg, Intravenous, Daily      Continuous IV Infusions:  multi-electrolyte, 125 mL/hr, Intravenous, Continuous      PRN Meds:  acetaminophen, 650 mg, Oral, Q6H PRN  butalbital-acetaminophen-caffeine, 1 tablet, Oral, Q8H PRN  diphenhydrAMINE, 50 mg, Intravenous, Q4H PRN  HYDROmorphone, 0.5 mg, Intravenous, Q4H PRN  hyoscyamine, 0.125 mg, Oral, Q4H PRN        IP CONSULT TO GASTROENTEROLOGY  IP CONSULT TO NUTRITION SERVICES    Network Utilization Review Department  ATTENTION: Please call with any questions or concerns to [phone] and carefully listen to the prompts so that you are directed to the right person. All voicemails are confidential.   For Discharge needs, contact Care Management DC Support Team at [phone] opt. [phone]  Send all requests for admission clinical reviews, approved or denied determinations and any other requests to dedicated fax number below belonging to the campus where the patient is receiving treatment. List of dedicated fax numbers for the Facilities:  FACILITY NAME UR FAX NUMBER   ADMISSION DENIALS (Administrative/Medical Necessity) [phone]   DISCHARGE SUPPORT TEAM (NETWORK) [phone]   PARENT CHILD HEALTH (Maternity/NICU/Pediatrics) [phone]   [facility] [phone]   [facility] [phone]   Counts include 234 beds at the [facility] [phone]   [facility] [phone]   [facility] [phone]   [facility] [phone]   [facility] [phone]   [facility] [phone]   [facility] -  [facility] [phone]   [facility] [phone]   [facility] [phone]   [facility] [phone]

## 2024-08-20 NOTE — ASSESSMENT & PLAN NOTE
Pt with known history of Crohns disease that presents with abdominal pain about 2 days ago following about 2 wks of bloody diarrhea. Pt follows with GI as OP and had a CT done today noted for proctocolitis  Patient is on Skyrizi as well as intravenous methotrexate which he takes on Thursday via infusion for her Crohns.   She initially not tolerating clears but currently not objecting to keep her on clears.   Will rest her bowels overnight and start on clears in the AM.   Continue with IV dilaudid for pain. Pt has multiple medication flagged for allergy due to her history of anaphylaxis to corn and corn containing medications.

## 2024-08-20 NOTE — PLAN OF CARE
Problem: PAIN - ADULT  Goal: Verbalizes/displays adequate comfort level or baseline comfort level  Description: Interventions:  - Encourage patient to monitor pain and request assistance  - Assess pain using appropriate pain scale  - Administer analgesics based on type and severity of pain and evaluate response  - Implement non-pharmacological measures as appropriate and evaluate response  - Consider cultural and social influences on pain and pain management  - Notify physician/advanced practitioner if interventions unsuccessful or patient reports new pain  Outcome: Progressing     Problem: INFECTION - ADULT  Goal: Absence or prevention of progression during hospitalization  Description: INTERVENTIONS:  - Assess and monitor for signs and symptoms of infection  - Monitor lab/diagnostic results  - Monitor all insertion sites, i.e. indwelling lines, tubes, and drains  - Monitor endotracheal if appropriate and nasal secretions for changes in amount and color  - [place] appropriate cooling/warming therapies per order  - Administer medications as ordered  - Instruct and encourage patient and family to use good hand hygiene technique  - Identify and instruct in appropriate isolation precautions for identified infection/condition  Outcome: Progressing  Goal: Absence of fever/infection during neutropenic period  Description: INTERVENTIONS:  - Monitor WBC    Outcome: Progressing     Problem: SAFETY ADULT  Goal: Patient will remain free of falls  Description: INTERVENTIONS:  - Educate patient/family on patient safety including physical limitations  - Instruct patient to call for assistance with activity   - Consult OT/PT to assist with strengthening/mobility   - Keep Call bell within reach  - Keep bed low and locked with side rails adjusted as appropriate  - Keep care items and personal belongings within reach  - Initiate and maintain comfort rounds  - Make Fall Risk Sign visible to staff  - Offer Toileting every 2 Hours,  in advance of need  - Initiate/Maintain comfort  rounds  - Obtain necessary fall risk management equipment: call bell  - Apply yellow socks and bracelet for high fall risk patients  - Consider moving patient to room near nurses station  Outcome: Progressing     Problem: DISCHARGE PLANNING  Goal: Discharge to home or other facility with appropriate resources  Description: INTERVENTIONS:  - Identify barriers to discharge w/patient and caregiver  - Arrange for needed discharge resources and transportation as appropriate  - Identify discharge learning needs (meds, wound care, etc.)  - Arrange for interpretive services to assist at discharge as needed  - Refer to Case Management Department for coordinating discharge planning if the patient needs post-hospital services based on physician/advanced practitioner order or complex needs related to functional status, cognitive ability, or social support system  Outcome: Progressing     Problem: Knowledge Deficit  Goal: Patient/family/caregiver demonstrates understanding of disease process, treatment plan, medications, and discharge instructions  Description: Complete learning assessment and assess knowledge base.  Interventions:  - Provide teaching at level of understanding  - Provide teaching via preferred learning methods  Outcome: Progressing     Problem: GASTROINTESTINAL - ADULT  Goal: Minimal or absence of nausea and/or vomiting  Description: INTERVENTIONS:  - Administer IV fluids if ordered to ensure adequate hydration  - Maintain NPO status until nausea and vomiting are resolved  - Nasogastric tube if ordered  - Administer ordered antiemetic medications as needed  - Provide nonpharmacologic comfort measures as appropriate  - Advance diet as tolerated, if ordered  - Consider nutrition services referral to assist patient with adequate nutrition and appropriate food choices  Outcome: Progressing  Goal: Maintains or returns to baseline bowel function  Description:  INTERVENTIONS:  - Assess bowel function  - Encourage oral fluids to ensure adequate hydration  - Administer IV fluids if ordered to ensure adequate hydration  - Administer ordered medications as needed  - Encourage mobilization and activity  - Consider nutritional services referral to assist patient with adequate nutrition and appropriate food choices  Outcome: Progressing  Goal: Maintains adequate nutritional intake  Description: INTERVENTIONS:  - Monitor percentage of each meal consumed  - Identify factors contributing to decreased intake, treat as appropriate  - Assist with meals as needed  - Monitor I&O, weight, and lab values if indicated  - Obtain nutrition services referral as needed  Outcome: Progressing

## 2024-08-20 NOTE — ASSESSMENT & PLAN NOTE
Continue prior to admission medication  Continue outpatient immunology follow-up with [facility]  Patient reports intolerance to all medications with corn

## 2024-08-20 NOTE — ASSESSMENT & PLAN NOTE
CT abd and pelvis tonight noted with findings below  Findings suggestive of proctocolitis.  Bilateral nephrolithiasis.  Hepatomegaly.    Continue with IV steroids  Continue pain meds  Follow up GI recommendations

## 2024-08-20 NOTE — ASSESSMENT & PLAN NOTE
History of Crohn's disease on Skyrizi as well as methotrexate  Will consult with GI   Continue with folic acid  Start on IV steroids  Bowel rest  Analgesia for better pain control  CT noted showing evidence of proctocolitis

## 2024-08-20 NOTE — H&P
[facility]  H and P  Name: Celine Roa I  MRN: [id number]  Unit/Bed#: 404-01 I Date of Admission: 8/19/2024   Date of Service: 8/20/2024 I Hospital Day: 0    Assessment & Plan   Generalized abdominal pain  Assessment & Plan  Pt with known history of Crohns disease that presents with abdominal pain about 2 days ago following about 2 wks of bloody diarrhea. Pt follows with GI as OP and had a CT done today noted for proctocolitis  Patient is on Skyrizi as well as intravenous methotrexate which he takes on Thursday via infusion for her Crohns.   She initially not tolerating clears but currently not objecting to keep her on clears.   Will rest her bowels overnight and start on clears in the AM.   Continue with IV dilaudid for pain. Pt has multiple medication flagged for allergy due to her history of anaphylaxis to corn and corn containing medications.         * Crohn disease (HCC)  Assessment & Plan  History of Crohn's disease on Skyrizi as well as methotrexate  Will consult with GI   Continue with folic acid  Start on IV steroids  Bowel rest  Analgesia for better pain control  CT noted showing evidence of proctocolitis      Proctocolitis  Assessment & Plan  CT abd and pelvis tonight noted with findings below  Findings suggestive of proctocolitis.  Bilateral nephrolithiasis.  Hepatomegaly.    Continue with IV steroids  Continue pain meds  Follow up GI recommendations      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HCC)  Assessment & Plan  Continue prior to admission medication  Continue outpatient immunology follow-up with [facility]  Patient reports intolerance to all medications with corn           History of Present Illness     HPI:  Celine Roa is a 49 y.o. female with a PMH of mast cell disease, hypothyroidism, lupus, GERD, asthma, mast cell activation syndrome, IBS-D, Crohn's disease on IV infusions as OP by GI, who presents to the emergency room for evaluation of worsening diarrhea,  nausea and vomiting for about 2 wks. Pt states she has been having some bloody bowel movement though 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 not noticed any bloody movement but she states this is due to the fact she has not had anything to eat so her diarrhea has subsided. Her stool now is apple colored. She also started having abdominal cramps for the last 2 days. No fever or chills. She has had decreased oral intake and feels like her mast cell disease causing her to have worsening symptoms when dehydrated. She states normally she has these events she gets admitted to be rehydrated and also treated with steroids. She states she is has severe allergic and anaphylaxis to corn and this is present in multiple medications and sometimes cause her symptoms to be worse. ED discussed the case with GI and recommendation is to admit if cannot tolerate PO. Her CT showing proctocolitis.       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 pt denies, documented in cardio note    Pain of right thigh 02/13/2023    Pancreatitis     sphinger of     Psychiatric disorder     RA (rheumatoid arthritis) (HCC)     Seizures (HCC)     Sepsis without acute organ dysfunction (HCC)     SIRS (systemic inflammatory response syndrome) (HCC)     Thrombocytosis 04/05/2022    Ulcerative colitis (HCC)      Visual impairment      Patient Active Problem List   Diagnosis    Chondromalacia    Vomiting and diarrhea    Vitamin D deficiency    Throat tightness    Right upper quadrant abdominal pain    Hypomagnesemia    Generalized anxiety disorder    Acquired hypothyroidism    Leukocytosis    Fibromyalgia    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HCC)    Meniere's disease    Chronic idiopathic urticaria    Disorder of synovium    Primary localized osteoarthrosis of ankle and foot    Thrush    Chronic back pain    Essential (hemorrhagic) thrombocythemia (HCC)    Proctitis    Mass of left axilla    Constipation    Mild protein-calorie malnutrition (HCC)    Anorexia    Crohn's colitis (HCC)    Overweight (BMI 25.0-29.9)    Current chronic use of systemic steroids    Migraine    MS (multiple sclerosis) (HCC)    Inflammatory bowel disease    Heart palpitations    Intractable nausea and vomiting    Anaphylactic reaction    Nephrolithiasis    Atrial tachycardia    Immunosuppression due to chronic steroid use (HCC)    Seizure (HCC)    Bladder tumor    Systemic lupus erythematosus, unspecified SLE type, unspecified organ involvement status (HCC)    Encephalopathy chronic    Passive suicidal ideations    Medical clearance for psychiatric admission    Seasonal allergies    GERD without esophagitis    Major depressive disorder with psychotic features (HCC)    Benzodiazepine misuse    Recurrent major depressive disorder, in partial remission (HCC)    Circadian rhythm sleep disorder    Insomnia due to medical condition    Iron deficiency    Iron deficiency anemia due to chronic blood loss    Depression due to physical illness    Common variable immunodeficiency (HCC)    Intractable right upper quadrant abdominal pain    Bladder spasm    Oral phase dysphagia    Intractable migraine without status migrainosus    Ulcerative colitis (HCC)    Pancreatitis    Multiple allergies    Idiopathic anaphylaxis    Crohn disease (HCC)    Allergic reaction     Traumatic brain injury (HCC)    Small fiber neuropathy    Pollen-food allergy    Oral allergy syndrome    Osteopenia    Immunocompromised (HCC)    History of peripheral edema    Hereditary alpha tryptasemia (HCC)    Hepatitis B antibody positive    Hashimoto's disease    H/o Lyme disease    Genetic disorder    Endometriosis    Anxiety    Weight disorder    Hepatitis    Hiatal hernia    Immunosuppression (HCC)    Chronic RUQ pain    Physical deconditioning    Urticaria    Sedative, hypnotic or anxiolytic use, unspecified with unspecified sedative, hypnotic or anxiolytic-induced disorder (HCC)    Proctocolitis    Generalized abdominal pain     Past Surgical History:   Procedure Laterality Date    ABDOMINAL SURGERY  2017    APPENDECTOMY      CHOLECYSTECTOMY      COLONOSCOPY  2019    CYSTOSCOPY N/A 06/08/2023    Procedure: CYSTOSCOPY, mini TURBT with fulgeration;  Surgeon: Tee Bruno MD;  Location: [facility];  Service: Urology    EGD AND COLONOSCOPY N/A 09/12/2017    Procedure: EGD AND COLONOSCOPY;  Surgeon: Tommy Oliver MD;  Location:  GI LAB;  Service: Gastroenterology    ERCP N/A 09/15/2017    Procedure: ENDOSCOPIC RETROGRADE CHOLANGIOPANCREATOGRAPHY (ERCP);  Surgeon: Dre Soto MD;  Location: [facility];  Service: Gastroenterology    ERCP  02/14/2024    HYSTERECTOMY      KNEE SURGERY Right     LAPAROSCOPIC ENDOMETRIOSIS FULGURATION      ORIF ANKLE FRACTURE Left     NE ARTHRS KNE SURG W/MENISCECTOMY MED/LAT W/SHVG Left 05/12/2017    Procedure: POSSIBLE MEDIAL MENISECTOMY;  Surgeon: Kristian Avila MD;  Location: [facility];  Service: Orthopedics    NE ARTHRS KNEE DEBRIDEMENT/SHAVING ARTCLR CRTLG Left 05/12/2017    Procedure: KNEE ARTHROSCOPY EVALUATION, CHONDROPLASTY;  Surgeon: Kristian Avila MD;  Location: [facility];  Service: Orthopedics    NE LAPS ABD PRTM&OMENTUM DX W/WO SPEC BR/WA SPX N/A 12/12/2017    Procedure: LAPAROSCOPY DIAGNOSTIC  WITH LYSIS OF ADHESIONS;  Surgeon: Olaf John DO;  Location: [facility];  Service: General    UPPER GASTROINTESTINAL ENDOSCOPY  2019       Social History   Social History     Substance and Sexual Activity   Alcohol Use Never     Social History     Substance and Sexual Activity   Drug Use Never     Social History     Tobacco Use   Smoking Status Never   Smokeless Tobacco Never       Family History:   Family History   Problem Relation Age of Onset    Ulcerative colitis Mother     Arthritis Mother     Colon polyps Mother     Heart failure Father     Neuropathy Father     Macular degeneration Father     Diabetes Father     Early death Father     Vision loss Father     Asthma Daughter     Hypothyroidism Daughter     Heart disease Maternal Grandmother     Arthritis Maternal Grandmother     No Known Problems Maternal Grandfather     Arthritis Paternal Grandmother     Macular degeneration Paternal Grandmother     Vision loss Paternal Grandmother     Lymphoma Paternal Grandfather     Cancer Paternal Grandfather     Early death Paternal Grandfather     Breast cancer Maternal Aunt     Cancer Maternal Aunt     Miscarriages / Stillbirths Maternal Aunt     Heart failure Paternal Aunt     Heart failure Paternal Aunt     Irritable bowel syndrome Paternal Aunt     Breast cancer Paternal Aunt 54    Breast cancer additional onset Paternal Aunt 58    Hypothyroidism Paternal Aunt     Cancer Paternal Aunt     No Known Problems Son     No Known Problems Son     Kidney disease Brother     Depression Paternal Uncle     Early death Paternal Uncle        Meds/Allergies       Current Facility-Administered Medications:     acetaminophen (TYLENOL) tablet 650 mg, 650 mg, Oral, Q6H PRN, Shirin Martin MD    ascorbic acid (VITAMIN C) tablet 1,000 mg, 1,000 mg, Oral, BID, Shirin Martin MD, 1,000 mg at 08/19/24 2114    b complex-vitamin C-folic acid (RENAL) capsule 1 capsule, 1 capsule, Oral, Daily With Dinner, Shirin Martin MD    budesonide (ENTOCORT EC) capsule 9 mg, 9 mg, Oral, Daily, Shirin  JOSUÉ Martin MD    butalbital-acetaminophen-caffeine (FIORICET,ESGIC) -40 mg per tablet 1 tablet, 1 tablet, Oral, Q8H PRN, Shirin Martin MD, 1 tablet at 08/20/24 0013    cetirizine (ZyrTEC) tablet 20 mg, 20 mg, Oral, BID, Shirin Martin MD    Cholecalciferol (VITAMIN D3) tablet 5,000 Units, 5,000 Units, Oral, Daily, Shirin Martin MD    cyanocobalamin (VITAMIN B-12) tablet 100 mcg, 100 mcg, Oral, Daily, Shirin Martin MD    diphenhydrAMINE (BENADRYL) injection 50 mg, 50 mg, Intravenous, Q6H PRN, Shirin Martin MD, 50 mg at 08/20/24 0013    famotidine (PEPCID) tablet 40 mg, 40 mg, Oral, Daily Before Breakfast, Shirin Martin MD    folic acid (FOLVITE) tablet 1 mg, 1 mg, Oral, Daily, Shirin Martin MD    HYDROmorphone (DILAUDID) injection 0.5 mg, 0.5 mg, Intravenous, Q4H PRN, Shirin Martin MD, 0.5 mg at 08/19/24 1932    hyoscyamine (LEVSIN/SL) SL tablet 0.125 mg, 0.125 mg, Oral, Q4H PRN, Shirin Martin MD    levothyroxine tablet 75 mcg, 75 mcg, Oral, Early Morning, Shirin Martin MD    liothyronine (CYTOMEL) tablet 5 mcg, 5 mcg, Oral, Daily, Shirin Martin MD    magnesium gluconate (MAGONATE) tablet 500 mg, 500 mg, Oral, BID, Shirin Martni MD    melatonin tablet 6 mg, 6 mg, Oral, HS, Shirin Martin MD, 6 mg at 08/19/24 2112    methylPREDNISolone sodium succinate (Solu-MEDROL) injection 40 mg, 40 mg, Intravenous, Daily, Shirin Martin MD    montelukast (SINGULAIR) tablet 10 mg, 10 mg, Oral, HS, Shirin Martin MD, 10 mg at 08/19/24 2112    multi-electrolyte (PLASMALYTE-A/ISOLYTE-S PH 7.4) IV solution, 125 mL/hr, Intravenous, Continuous, Shirin Martin MD, Last Rate: 125 mL/hr at 08/19/24 2112, 125 mL/hr at 08/19/24 2112    Allergies   Allergen Reactions    Aimovig [Erenumab-Aooe] Anaphylaxis    Amitriptyline Anaphylaxis     According to the patient she had nphylaxis    Aspergillus Allergy Skin Test Other (See Comments), Hives and  "Swelling    Azathioprine Other (See Comments) and Anaphylaxis     pancreatitis  According to patient she needed Epipen    Buspar [Buspirone] Hives and Shortness Of Breath    Citric Acid-Polysorbate 80 Abdominal Pain, Anaphylaxis, Anxiety, Bradycardia, Diarrhea, Fatigue, GI Intolerance, Headache, Hives, Hyperactivity, Itching, Lip Swelling, Nausea Only, Palpitations, Rash, Shortness Of Breath, Tachycardia, Throat Swelling, Tongue Swelling and Vomiting    Corn Dextrin Anaphylaxis     Any corn based products    Erenumab Anaphylaxis     patient sent portal message stating she had anaphylaxis  patient sent portal message stating she had anaphylaxis      Gadolinium Abdominal Pain, Anaphylaxis, Anxiety, Confusion, Delirium, Dizziness, Eye Swelling, Fatigue, GI Intolerance, Headache, Hives, Irritability, Itching, Lip Swelling, Nausea Only, Palpitations, Photosensitivity, Rash, Seizures, Shortness Of Breath, Swelling, Syncope, Throat Swelling, Tongue Swelling, Tremor, Visual Disturbance and Vomiting    Gelatin - Food Allergy Anaphylaxis    Heparin Hives     Painful welts     Lavender Oil Anaphylaxis     Other reaction(s): anaphalxis    Malto Dextrin [Dextrin] Anaphylaxis    Penicillium Notatum (Diagnost) Shortness Of Breath and Swelling    Red Dye - Food Allergy Anaphylaxis and Other (See Comments)     intolerance    Sumatriptan Anaphylaxis     Bradycardia, sob, back pressure, head pain,throat tightness  According to the patient she had anphylaxis    Ustekinumab (Murine) Anaphylaxis    Lidocaine Hives, Itching and Rash    Contrast [Iodinated Contrast Media] Other (See Comments)     Pt states needs to be premedicated prior to injection    Corn Oil - Food Allergy - Food Allergy Hives    Humira [Adalimumab] Other (See Comments)     \"I develop drug induced lupus\"    Ibuprofen Hives and Throat Swelling    Polyethylene Glycol Diarrhea and Vomiting    Remicade [Infliximab] Other (See Comments)     \"I develop drug induced lupus\"    " "Sulfa Antibiotics Hives and Other (See Comments)    Sulfate Hives    Sulfites - Food Allergy Hives    Sweet Corn Extract Skin Test - Food Allergy Hives and Itching    Chlorhexidine Itching    Freederm Adhesive Remover [New Skin] Rash    Histamine Hives, Rash and Other (See Comments)     Intolerance      Wound Dressings Rash       Review of Systems  A detailed 12 point review of systems was conducted and is negative apart from those mentioned in the HPI.      Objective   Vitals: Blood pressure 95/60, pulse 72, temperature 97.5 °F (36.4 °C), resp. rate 18, height 5' 5\" (1.651 m), weight 74.7 kg (164 lb 10.9 oz), SpO2 97%, not currently breastfeeding.    Physical Exam   General- Awake and alert, NAD  HEENT: PERRLA, EOMI, sclera anicteric, moist mucous membranes, tongue mucosa without lesions.  Neck: supple, no JVD, lymphadenopathy, thyromegaly.  Heart: Regular rate and rhythm, S1S2 present.  No murmur, rub or gallop.    Lungs; Clear to auscultation bilaterally.  No wheezing, crackles or rhonchi.  No accessory muscle use or respiratory distress.  Abdomen: soft, mild vague tenderness, generalized, non-distended, NABS.  No guarding or rebound. No peritoneal sound or mass.  Extremities: no clubbing, cyanosis, or edema.  2+ pedal pulses bilaterally.  Full range of motion.  Neurologic:  Cranial nerves II-XII intact.  Strength and sensation globally intact. Speech fluent and goal directed.  Awake, alert and oriented x 3.  Skin: warm and dry. No petechiae, purpura or rash.    Lab Results:   Results from last 7 days   Lab Units 08/19/24  1346   WBC Thousand/uL 8.86   HEMOGLOBIN g/dL 15.2   HEMATOCRIT % 45.8   PLATELETS Thousands/uL 449*     Results from last 7 days   Lab Units 08/19/24  1346   POTASSIUM mmol/L 3.9   CHLORIDE mmol/L 102   CO2 mmol/L 26   BUN mg/dL 7   CREATININE mg/dL 0.84   CALCIUM mg/dL 8.8         Imaging:  CT abdomen pelvis without contrast   Final Result by Johnnie Carey MD (08/19 1447)      Findings " "suggestive of proctocolitis.   Bilateral nephrolithiasis.   Hepatomegaly.      The study was marked in EPIC for immediate notification.      Workstation performed: [id number]              Code Status: Level 1 - Full Code      Counseling / Coordination of Care  Total floor / unit time spent today 60 minutes.  Greater than 50% of total time was spent with the patient and / or family counseling and / or coordination of care.        Portions of the record may have been created with voice recognition software. Occasional wrong word or \"sound a like\" substitutions may have occurred due to the inherent limitations of voice recognition software. Read the chart carefully and recognize, using context, where substitutions have occurred.    "

## 2024-08-20 NOTE — ASSESSMENT & PLAN NOTE
Continue outpatient immunology follow-up with [facility]  Patient reports intolerance to all medications with corn - refusing all oral medications currently   Continue IV Pepcid, IV Solu-Medrol, IV Benadryl prn

## 2024-08-20 NOTE — PLAN OF CARE
Problem: PAIN - ADULT  Goal: Verbalizes/displays adequate comfort level or baseline comfort level  Description: Interventions:  - Encourage patient to monitor pain and request assistance  - Assess pain using appropriate pain scale  - Administer analgesics based on type and severity of pain and evaluate response  - Implement non-pharmacological measures as appropriate and evaluate response  - Consider cultural and social influences on pain and pain management  - Notify physician/advanced practitioner if interventions unsuccessful or patient reports new pain  Outcome: Progressing     Problem: INFECTION - ADULT  Goal: Absence or prevention of progression during hospitalization  Description: INTERVENTIONS:  - Assess and monitor for signs and symptoms of infection  - Monitor lab/diagnostic results  - Monitor all insertion sites, i.e. indwelling lines, tubes, and drains  - Monitor endotracheal if appropriate and nasal secretions for changes in amount and color  - [place] appropriate cooling/warming therapies per order  - Administer medications as ordered  - Instruct and encourage patient and family to use good hand hygiene technique  - Identify and instruct in appropriate isolation precautions for identified infection/condition  Outcome: Progressing  Goal: Absence of fever/infection during neutropenic period  Description: INTERVENTIONS:  - Monitor WBC    Outcome: Progressing     Problem: SAFETY ADULT  Goal: Patient will remain free of falls  Description: INTERVENTIONS:  - Educate patient/family on patient safety including physical limitations  - Instruct patient to call for assistance with activity   - Consult OT/PT to assist with strengthening/mobility   - Keep Call bell within reach  - Keep bed low and locked with side rails adjusted as appropriate  - Keep care items and personal belongings within reach  - Initiate and maintain comfort rounds  - Make Fall Risk Sign visible to staff  - Offer Toileting every 2 Hours,  in advance of need  - Initiate/Maintain comfort  rounds  - Obtain necessary fall risk management equipment: call bell  - Apply yellow socks and bracelet for high fall risk patients  - Consider moving patient to room near nurses station  Outcome: Progressing     Problem: DISCHARGE PLANNING  Goal: Discharge to home or other facility with appropriate resources  Description: INTERVENTIONS:  - Identify barriers to discharge w/patient and caregiver  - Arrange for needed discharge resources and transportation as appropriate  - Identify discharge learning needs (meds, wound care, etc.)  - Arrange for interpretive services to assist at discharge as needed  - Refer to Case Management Department for coordinating discharge planning if the patient needs post-hospital services based on physician/advanced practitioner order or complex needs related to functional status, cognitive ability, or social support system  Outcome: Progressing     Problem: Knowledge Deficit  Goal: Patient/family/caregiver demonstrates understanding of disease process, treatment plan, medications, and discharge instructions  Description: Complete learning assessment and assess knowledge base.  Interventions:  - Provide teaching at level of understanding  - Provide teaching via preferred learning methods  Outcome: Progressing     Problem: GASTROINTESTINAL - ADULT  Goal: Minimal or absence of nausea and/or vomiting  Description: INTERVENTIONS:  - Administer IV fluids if ordered to ensure adequate hydration  - Maintain NPO status until nausea and vomiting are resolved  - Nasogastric tube if ordered  - Administer ordered antiemetic medications as needed  - Provide nonpharmacologic comfort measures as appropriate  - Advance diet as tolerated, if ordered  - Consider nutrition services referral to assist patient with adequate nutrition and appropriate food choices  Outcome: Progressing  Goal: Maintains or returns to baseline bowel function  Description:  INTERVENTIONS:  - Assess bowel function  - Encourage oral fluids to ensure adequate hydration  - Administer IV fluids if ordered to ensure adequate hydration  - Administer ordered medications as needed  - Encourage mobilization and activity  - Consider nutritional services referral to assist patient with adequate nutrition and appropriate food choices  Outcome: Progressing  Goal: Maintains adequate nutritional intake  Description: INTERVENTIONS:  - Monitor percentage of each meal consumed  - Identify factors contributing to decreased intake, treat as appropriate  - Assist with meals as needed  - Monitor I&O, weight, and lab values if indicated  - Obtain nutrition services referral as needed  Outcome: Progressing

## 2024-08-20 NOTE — PROGRESS NOTES
"[facility]  Progress Note  Name: Celine Roa I  MRN: [id number]  Unit/Bed#: 404-01 I Date of Admission: 8/19/2024   Date of Service: 8/20/2024 I Hospital Day: 0    Assessment & Plan   * Crohn disease (HCC)  Assessment & Plan  48 yo female PMHx mast cell disease, corn allergy, GERD, IBS-D, Crohn on IV infusions presents with abdominal pain, rectal bleeding,  diarrhea and decreased oral intake in setting of Crohn's flare with proctocolitis   History of Crohn's disease on Skyrizi as well as methotrexate  CT abd/pelvis: findings suggestive of proctocolitis  H&H stable. No further rectal bleeding or BM since admission  Appreciate GI consult  Diet advanced to surgical soft for lunch  Continue IVF hydration  Continue IV steroids, IV Benadryl   Stool studies ordered      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HCC)  Assessment & Plan  Continue outpatient immunology follow-up with [facility]  Patient reports intolerance to all medications with corn - refusing all oral medications currently   Continue IV Pepcid, IV Solu-Medrol, IV Benadryl prn    Proctocolitis  Assessment & Plan  See management as above under \"Crohn disease\"                 VTE Pharmacologic Prophylaxis:   SCDs    Mobility:   Basic Mobility Inpatient Raw Score: 24  JH-HLM Goal: 8: Walk 250 feet or more  JH-HLM Achieved: 8: Walk 250 feet ot more  JH-HLM Goal achieved. Continue to encourage appropriate mobility.    Patient Centered Rounds: I performed bedside rounds with nursing staff today.   Discussions with Specialists or Other Care Team Provider: case management, GI    Education and Discussions with Family / Patient: Patient declined call to .     Total Time Spent on Date of Encounter in care of patient:  mins. This time was spent on one or more of the following: performing physical exam; counseling and coordination of care; obtaining or reviewing history; documenting in the medical record; reviewing/ordering " tests, medications or procedures; communicating with other healthcare professionals and discussing with patient's family/caregivers.    Current Length of Stay: 0 day(s)  Current Patient Status: Observation   Certification Statement: The patient will continue to require additional inpatient hospital stay due to diet advancement, IV steroids ,IV fluid hydration, serial monitoring   Discharge Plan: Anticipate discharge in 24-48 hrs to home.    Code Status: Level 1 - Full Code    Subjective:   Patient seen and examined. Feeling better than yesterday. No nausea or vomiting. Feels ready to advance diet. No bowel movement since admission.     Objective:     Vitals:   Temp (24hrs), Av.7 °F (36.5 °C), Min:97.4 °F (36.3 °C), Max:98.3 °F (36.8 °C)    Temp:  [97.4 °F (36.3 °C)-98.3 °F (36.8 °C)] 97.6 °F (36.4 °C)  HR:  [] 70  Resp:  [15-68] 15  BP: ()/() 120/84  SpO2:  [94 %-99 %] 99 %  Body mass index is 27.4 kg/m².     Input and Output Summary (last 24 hours):     Intake/Output Summary (Last 24 hours) at 2024 1137  Last data filed at 2024 2316  Gross per 24 hour   Intake --   Output 900 ml   Net -900 ml       Physical Exam:   Physical Exam  HENT:      Head: Normocephalic and atraumatic.   Cardiovascular:      Rate and Rhythm: Normal rate and regular rhythm.   Pulmonary:      Effort: Pulmonary effort is normal. No respiratory distress.      Breath sounds: Normal breath sounds.   Abdominal:      General: Abdomen is flat. Bowel sounds are normal.      Palpations: Abdomen is soft.      Tenderness: There is no abdominal tenderness. There is no guarding.   Musculoskeletal:      Right lower leg: No edema.      Left lower leg: No edema.   Skin:     General: Skin is warm and dry.   Neurological:      General: No focal deficit present.      Mental Status: She is alert and oriented to person, place, and time.          Additional Data:     Labs:  Results from last 7 days   Lab Units 24  1346   WBC  Thousand/uL 8.86   HEMOGLOBIN g/dL 15.2   HEMATOCRIT % 45.8   PLATELETS Thousands/uL 449*   SEGS PCT % 66   LYMPHO PCT % 19   MONO PCT % 8   EOS PCT % 6     Results from last 7 days   Lab Units 08/20/24  0505   SODIUM mmol/L 138   POTASSIUM mmol/L 3.9   CHLORIDE mmol/L 106   CO2 mmol/L 22   BUN mg/dL 5   CREATININE mg/dL 0.60   ANION GAP mmol/L 10   CALCIUM mg/dL 8.2*   ALBUMIN g/dL 3.9   TOTAL BILIRUBIN mg/dL 0.40   ALK PHOS U/L 68   ALT U/L 22   AST U/L 16   GLUCOSE RANDOM mg/dL 137                       Lines/Drains:  Invasive Devices       Peripheral Intravenous Line  Duration             Peripheral IV 08/19/24 Left;Upper Arm <1 day              Line  Duration             Pump Device  -- days                      Telemetry:  Telemetry Orders (From admission, onward)               24 Hour Telemetry Monitoring  Continuous x 24 Hours (Telem)        Question:  Reason for 24 Hour Telemetry  Answer:  Arrhythmias requiring acute medical intervention / PPM or ICD malfunction                     Telemetry Reviewed: Normal Sinus Rhythm  Indication for Continued Telemetry Use: Arrthymias requiring medical therapy             Imaging: Reviewed radiology reports from this admission including: abdominal/pelvic CT    Recent Cultures (last 7 days):         Last 24 Hours Medication List:   Current Facility-Administered Medications   Medication Dose Route Frequency Provider Last Rate    acetaminophen  650 mg Oral Q6H PRN Shirin Martin MD      butalbital-acetaminophen-caffeine  1 tablet Oral Q8H PRN Shirin Martin MD      diphenhydrAMINE  50 mg Intravenous Q4H PRN Abby Curtis PA-C      famotidine  20 mg Intravenous Q12H [facility] Shirin Martin MD      HYDROmorphone  0.5 mg Intravenous Q4H PRN Shirin Martin MD      hyoscyamine  0.125 mg Oral Q4H PRN Shirin Martin MD      methylPREDNISolone sodium succinate  40 mg Intravenous Daily Shirin Martin MD      multi-electrolyte  125 mL/hr  Intravenous Continuous Shirin Martin  mL/hr (08/19/24 2112)        Today, Patient Was Seen By: Abby Curtis PA-C    **Please Note: This note may have been constructed using a voice recognition system.**

## 2024-08-20 NOTE — CONSULTS
Consultation -  [facility]  Celine Roa 49 y.o. female MRN: [id number]  Unit/Bed#: 404-01 Encounter: [id number]        Inpatient consult to gastroenterology  Consult performed by: JONATHAN Torres  Consult ordered by: Shirin Martin MD          Reason for Consult / Principal Problem:     Crohn's disease  Proctocolitis      ASSESSMENT AND PLAN:     The patient is a 49 y.o. female with a past medical history of mast cell disease, corn allergy, hypothyroidism, lupus, GERD, asthma, mast cell activation syndrome, IBS-D, Crohn's disease on IV infusions and is typically followed by Dr. Levine, who is being seen for a consultation today chronic Crohn's flareup with proctocolitis, generalized abdominal pain, and hepatomegaly.    Serology: (8/20/24): Calcium 8.2.    Exam:  Oral mucosa normal upon visual inspection, without any sores, lesions, or ulcerations. Sclera without icterus and benign. Lung sounds clear to auscultation b/l. Normal S1 & S2 upon exam. Abdomen is soft, nondistended, with no significant tenderness noted upon exam, with very faint bowel sounds x 4.  No edema noted of the b/l lower extremities upon exam today. Skin is non-icteric.       Crohn's disease  Proctocolitis  Generalized abdominal pain  Hepatomegaly    After a discussion with the attending, Dr. Fountain, at this point in time we do not feel there is any immediate need for any kind of endoscopic procedures as we feel that the rectal bleeding is most likely due to an acute flareup of her chronic Crohn disease and proceeding with a repeat outpatient colonoscopy is reasonable at this time.  However, if her hemoglobin would become unstable or symptoms would worsen, we does consider a colonoscopy prior to discharge if needed.    At this point in time we will have her continue a clear liquid diet for breakfast and then at lunch increase her to a soft surgical diet, increasing her diet slowly and as tolerated and see how she does.   The patient was agreeable and verbalized an understanding.    Can continue IV hydration as ordered.    Proceed with stool studies as ordered.    Continue to maintain a large bore IV.  Continue to monitor hemoglobin and transfuse as per protocol.   Continue with serial abdominal exams.   Continue to monitor stool output for any overt signs of GI bleeding.   Continue famotidine as ordered.  Continue PRN antiemetics as ordered.   Continue as needed diphenhydramine and hyoscyamine as ordered.  Continue rest of medications as per primary team.   Continue supportive care.     GI will continue to follow.    ______________________________________________________________________    HPI: Patient is a 49 y.o. female with a past medical history of mast cell disease, corn allergy, hypothyroidism, lupus, GERD, asthma, mast cell activation syndrome, IBS-D, Crohn's disease on IV infusions and is typically followed by Dr. Levine, who presented to the ER yesterday with worsening nausea, vomiting, abdominal pain, and diarrhea with blood for the past 2 weeks.  The patient reports that overall the symptoms started approximately 3 weeks ago and had sent a message to Dr. Levine and was going to the infusion center for hydration and IV medications as needed and initially felt as though things were starting to improve.  However, the patient reports that several days ago she started with the nausea, headaches, vomiting, worsening abdominal pain, and then diarrhea with bloody stools.  The patient reports she was worried that she had an obstruction because she was not having regular bowel movements.  The patient did have a CT of the abdomen pelvis upon admission which did show proctocolitis.  Stool studies have been ordered, but have yet to be collected.    The patient reports that they are currently experiencing mild, diffuse abdominal pain, but, denies any reflux, nausea, dysphagia, vomiting, decreased appetite, or unplanned weight  loss.    The patient reports that they are currently experiencing diarrhea/loose, bloody stools, but, denies any constipation, straining, or melena. Last BM: 8/19/24. Flatus: Yes.    The patient is currently tolerating a clear liquid diet and is hungry as she would like to try to get back on a regular diet so she can go home sooner than later, however, also wants to make sure that she is adequately hydrated and having regular bowel movements before she goes home.    Serology Upon Admission: (8/19/24) platelets 449, MPV 8.7, C-reactive protein 20.4.    Meds: Budesonide 9 mg daily, famotidine 20 mg twice daily, methylprednisone 40 mg daily hide Kosick mean and diphenhydramine as needed.    Imaging: (8/19/24) CT of the abdomen and pelvis without contrast: Findings suggestive of proctocolitis.  Bilateral nephrolithiasis.  Hepatomegaly.    Endoscopy History: ERCP: (2/14/24) Mild abnormal mucosa with erosion in the antrum. Otherwise normal. Biopsies done. Evidence of prior sphincterotomy at the ampulla. Prominent minor papilla. The sphincterotomy was extended. Balloon sweeps were done and slight debris was removed. The balloon (12mm) passed easily across the ampulla. Occlusion cholangiogram was then obtained and no residual filling defect was seen. There was good bile drainage seen.     EGD: (9/20/23):  Normal. Path: Rare mast cells, but overall not significantly increased and this amount was within expected limits.     COLONOSCOPY: Flex Sig (9/20/23): Mild abnormal mucosa, consistent with Crohn's disease in the sigmoid colon and rectosigmoid; performed cold forceps biopsy.     DUE: 9/20/24    REVIEW OF SYSTEMS:    CONSTITUTIONAL: Denies any fever, chills, rigors, and weight loss.  HEENT: No earache or tinnitus. Denies hearing loss or visual disturbances.  CARDIOVASCULAR: No chest pain or palpitations.   RESPIRATORY: Denies any cough, hemoptysis, shortness of breath or dyspnea on exertion.  GASTROINTESTINAL: As noted in  the History of Present Illness.   GENITOURINARY: No problems with urination. Denies any hematuria or dysuria.  NEUROLOGIC: No dizziness or vertigo, denies headaches.   MUSCULOSKELETAL: Denies any muscle or joint pain.   SKIN: Denies skin rashes or itching.   ENDOCRINE: Denies excessive thirst. Denies intolerance to heat or cold.  PSYCHOSOCIAL: Denies depression or anxiety. Denies any recent memory loss.       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 pt denies, documented in cardio note    Pain of right thigh 02/13/2023    Pancreatitis     sphinger of     Psychiatric disorder     RA (rheumatoid arthritis) (HCC)     Seizures (HCC)     Sepsis without acute organ dysfunction (HCC)     SIRS (systemic inflammatory response syndrome) (HCC)     Thrombocytosis 04/05/2022    Ulcerative colitis (HCC)     Visual impairment      Past Surgical History:   Procedure Laterality Date    ABDOMINAL SURGERY  2017    APPENDECTOMY      CHOLECYSTECTOMY      COLONOSCOPY  2019    CYSTOSCOPY N/A 06/08/2023    Procedure: CYSTOSCOPY, mini TURBT with fulgeration;  Surgeon: Tee Bruno MD;  Location: [facility];  Service: Urology    EGD AND COLONOSCOPY N/A 09/12/2017    Procedure: EGD AND COLONOSCOPY;  Surgeon: Tommy Oliver MD;  Location: [facility];  Service:  Gastroenterology    ERCP N/A 09/15/2017    Procedure: ENDOSCOPIC RETROGRADE CHOLANGIOPANCREATOGRAPHY (ERCP);  Surgeon: Dre Soto MD;  Location:  GI LAB;  Service: Gastroenterology    ERCP  02/14/2024    HYSTERECTOMY      KNEE SURGERY Right     LAPAROSCOPIC ENDOMETRIOSIS FULGURATION      ORIF ANKLE FRACTURE Left     CA ARTHRS KNE SURG W/MENISCECTOMY MED/LAT W/SHVG Left 05/12/2017    Procedure: POSSIBLE MEDIAL MENISECTOMY;  Surgeon: Kristian Avila MD;  Location: [facility];  Service: Orthopedics    CA ARTHRS KNEE DEBRIDEMENT/SHAVING ARTCLR CRTLG Left 05/12/2017    Procedure: KNEE ARTHROSCOPY EVALUATION, CHONDROPLASTY;  Surgeon: Kristian Avlia MD;  Location: [facility];  Service: Orthopedics    CA LAPS ABD PRTM&OMENTUM DX W/WO SPEC BR/WA SPX N/A 12/12/2017    Procedure: LAPAROSCOPY DIAGNOSTIC  WITH LYSIS OF ADHESIONS;  Surgeon: Olaf John DO;  Location:  MAIN OR;  Service: General    UPPER GASTROINTESTINAL ENDOSCOPY  2019     Social History   Social History     Substance and Sexual Activity   Alcohol Use Never     Social History     Substance and Sexual Activity   Drug Use Never     Social History     Tobacco Use   Smoking Status Never   Smokeless Tobacco Never     Family History   Problem Relation Age of Onset    Ulcerative colitis Mother     Arthritis Mother     Colon polyps Mother     Heart failure Father     Neuropathy Father     Macular degeneration Father     Diabetes Father     Early death Father     Vision loss Father     Asthma Daughter     Hypothyroidism Daughter     Heart disease Maternal Grandmother     Arthritis Maternal Grandmother     No Known Problems Maternal Grandfather     Arthritis Paternal Grandmother     Macular degeneration Paternal Grandmother     Vision loss Paternal Grandmother     Lymphoma Paternal Grandfather     Cancer Paternal Grandfather     Early death Paternal Grandfather     Breast cancer Maternal Aunt     Cancer Maternal Aunt     Miscarriages / Stillbirths Maternal Aunt      "Heart failure Paternal Aunt     Heart failure Paternal Aunt     Irritable bowel syndrome Paternal Aunt     Breast cancer Paternal Aunt 54    Breast cancer additional onset Paternal Aunt 58    Hypothyroidism Paternal Aunt     Cancer Paternal Aunt     No Known Problems Son     No Known Problems Son     Kidney disease Brother     Depression Paternal Uncle     Early death Paternal Uncle        Meds/Allergies       Medications Prior to Admission:     Ascorbic Acid (vitamin C) 1000 MG tablet    B Complex Vitamins (VITAMIN B COMPLEX PO)    B-D 3CC LUER-SHAMEKA SYR 25GX1\" 25G X 1\" 3 ML MISC    budesonide (ENTOCORT EC) 3 MG capsule    butalbital-acetaminophen-caffeine (FIORICET,ESGIC) -40 mg per tablet    cetirizine (ZyrTEC) 10 mg tablet    cholecalciferol (VITAMIN D3) 1,000 units tablet    cyanocobalamin (VITAMIN B-12) 100 mcg tablet    diphenhydrAMINE (BENADRYL) 50 mg capsule    ergocalciferol (VITAMIN D2) 50,000 units    famotidine (PEPCID) 40 MG tablet    fluconazole (DIFLUCAN) 100 mg tablet    folic acid (KP Folic Acid) 1 mg tablet    furosemide (LASIX) 20 mg tablet    hydrOXYzine HCL (ATARAX) 25 mg tablet    HYDROXYZINE HCL PO    hyoscyamine (ANASPAZ,LEVSIN) 0.125 MG tablet    Lactobacillus (PROBIOTIC ACIDOPHILUS PO)    Lactobacillus-Inulin ([facility]) CAPS    leuprolide 1 mg/0.2 mL    levalbuterol (XOPENEX HFA) 45 mcg/act inhaler    levothyroxine 50 mcg tablet    liothyronine (CYTOMEL) 5 mcg tablet    magnesium gluconate (MAGONATE) 500 mg tablet    Melatonin 3 MG SUBL    Methotrexate 25 MG/ML SOSY    methotrexate 50 MG/2ML injection    mometasone (ELOCON) 0.1 % cream    montelukast (SINGULAIR) 10 mg tablet    NEEDLE, DISP, 27 G (B-D DISP NEEDLE 01TP4-3/4\") 27G X 1-1/4\" MISC    Omalizumab 300 MG/2  ML SOSY    PEPPERMINT OIL PO    predniSONE 20 mg tablet    Probiotic Product (CULTURELLE ABDOMINAL SUPPORT PO)    Riboflavin (Vitamin B-2) 100 MG TABS    risankizumab-rzaa (Skyrizi) 360 MG/2.4ML SOCT    " TechLite Lancets MISC  Current Facility-Administered Medications   Medication Dose Route Frequency    acetaminophen (TYLENOL) tablet 650 mg  650 mg Oral Q6H PRN    ascorbic acid (VITAMIN C) tablet 1,000 mg  1,000 mg Oral BID    b complex-vitamin C-folic acid (RENAL) capsule 1 capsule  1 capsule Oral Daily With Dinner    budesonide (ENTOCORT EC) capsule 9 mg  9 mg Oral Daily    butalbital-acetaminophen-caffeine (FIORICET,ESGIC) -40 mg per tablet 1 tablet  1 tablet Oral Q8H PRN    cetirizine (ZyrTEC) tablet 20 mg  20 mg Oral BID    Cholecalciferol (VITAMIN D3) tablet 5,000 Units  5,000 Units Oral Daily    cyanocobalamin (VITAMIN B-12) tablet 100 mcg  100 mcg Oral Daily    diphenhydrAMINE (BENADRYL) injection 50 mg  50 mg Intravenous Q6H PRN    Famotidine (PF) (PEPCID) injection 20 mg  20 mg Intravenous Q12H KARISSA    folic acid (FOLVITE) tablet 1 mg  1 mg Oral Daily    HYDROmorphone (DILAUDID) injection 0.5 mg  0.5 mg Intravenous Q4H PRN    hyoscyamine (LEVSIN/SL) SL tablet 0.125 mg  0.125 mg Oral Q4H PRN    levothyroxine tablet 75 mcg  75 mcg Oral Early Morning    liothyronine (CYTOMEL) tablet 5 mcg  5 mcg Oral Daily    magnesium gluconate (MAGONATE) tablet 500 mg  500 mg Oral BID    melatonin tablet 6 mg  6 mg Oral HS    methylPREDNISolone sodium succinate (Solu-MEDROL) injection 40 mg  40 mg Intravenous Daily    montelukast (SINGULAIR) tablet 10 mg  10 mg Oral HS    multi-electrolyte (PLASMALYTE-A/ISOLYTE-S PH 7.4) IV solution  125 mL/hr Intravenous Continuous       Allergies   Allergen Reactions    Aimovig [Erenumab-Aooe] Anaphylaxis    Amitriptyline Anaphylaxis     According to the patient she had nphylaxis    Aspergillus Allergy Skin Test Other (See Comments), Hives and Swelling    Azathioprine Other (See Comments) and Anaphylaxis     pancreatitis  According to patient she needed Epipen    Buspar [Buspirone] Hives and Shortness Of Breath    Citric Acid-Polysorbate 80 Abdominal Pain, Anaphylaxis, Anxiety,  "Bradycardia, Diarrhea, Fatigue, GI Intolerance, Headache, Hives, Hyperactivity, Itching, Lip Swelling, Nausea Only, Palpitations, Rash, Shortness Of Breath, Tachycardia, Throat Swelling, Tongue Swelling and Vomiting    Corn Dextrin Anaphylaxis     Any corn based products    Erenumab Anaphylaxis     patient sent portal message stating she had anaphylaxis  patient sent portal message stating she had anaphylaxis      Gadolinium Abdominal Pain, Anaphylaxis, Anxiety, Confusion, Delirium, Dizziness, Eye Swelling, Fatigue, GI Intolerance, Headache, Hives, Irritability, Itching, Lip Swelling, Nausea Only, Palpitations, Photosensitivity, Rash, Seizures, Shortness Of Breath, Swelling, Syncope, Throat Swelling, Tongue Swelling, Tremor, Visual Disturbance and Vomiting    Gelatin - Food Allergy Anaphylaxis    Heparin Hives     Painful welts     Lavender Oil Anaphylaxis     Other reaction(s): anaphalxis    Malto Dextrin [Dextrin] Anaphylaxis    Penicillium Notatum (Diagnost) Shortness Of Breath and Swelling    Red Dye - Food Allergy Anaphylaxis and Other (See Comments)     intolerance    Sumatriptan Anaphylaxis     Bradycardia, sob, back pressure, head pain,throat tightness  According to the patient she had anphylaxis    Ustekinumab (Murine) Anaphylaxis    Lidocaine Hives, Itching and Rash    Contrast [Iodinated Contrast Media] Other (See Comments)     Pt states needs to be premedicated prior to injection    Corn Oil - Food Allergy - Food Allergy Hives    Humira [Adalimumab] Other (See Comments)     \"I develop drug induced lupus\"    Ibuprofen Hives and Throat Swelling    Polyethylene Glycol Diarrhea and Vomiting    Remicade [Infliximab] Other (See Comments)     \"I develop drug induced lupus\"    Sulfa Antibiotics Hives and Other (See Comments)    Sulfate Hives    Sulfites - Food Allergy Hives    Sweet Corn Extract Skin Test - Food Allergy Hives and Itching    Chlorhexidine Itching    Freederm Adhesive Remover [New Skin] Rash    " "Histamine Hives, Rash and Other (See Comments)     Intolerance      Wound Dressings Rash           Objective     Blood pressure 95/60, pulse 72, temperature 97.5 °F (36.4 °C), resp. rate 18, height 5' 5\" (1.651 m), weight 74.7 kg (164 lb 10.9 oz), SpO2 97%, not currently breastfeeding. Body mass index is 27.4 kg/m².      Intake/Output Summary (Last 24 hours) at 8/20/2024 0712  Last data filed at 8/19/2024 2316  Gross per 24 hour   Intake --   Output 900 ml   Net -900 ml         PHYSICAL EXAM:      General Appearance:   Alert, cooperative, no distress   HEENT:   Normocephalic, atraumatic, anicteric.     Neck:  Supple, symmetrical, trachea midline   Lungs:   Clear to auscultation bilaterally; no rales, rhonchi or wheezing; respirations unlabored    Heart::   Regular rate and rhythm; no murmur, rub, or gallop.   Abdomen:   Soft, non-tender, non-distended; normal bowel sounds; no masses, no organomegaly    Genitalia:   Deferred    Rectal:   Deferred    Extremities:  No cyanosis, clubbing or edema    Pulses:  2+ and symmetric all extremities    Skin:  No jaundice, rashes, or lesions    Lymph nodes:  No palpable cervical lymphadenopathy        Lab Results:   Admission on 08/19/2024   Component Date Value    WBC 08/19/2024 8.86     RBC 08/19/2024 4.77     Hemoglobin 08/19/2024 15.2     Hematocrit 08/19/2024 45.8     MCV 08/19/2024 96     MCH 08/19/2024 31.9     MCHC 08/19/2024 33.2     RDW 08/19/2024 13.2     MPV 08/19/2024 8.7 (L)     Platelets 08/19/2024 449 (H)     nRBC 08/19/2024 0     Segmented % 08/19/2024 66     Immature Grans % 08/19/2024 0     Lymphocytes % 08/19/2024 19     Monocytes % 08/19/2024 8     Eosinophils Relative 08/19/2024 6     Basophils Relative 08/19/2024 1     Absolute Neutrophils 08/19/2024 5.80     Absolute Immature Grans 08/19/2024 0.03     Absolute Lymphocytes 08/19/2024 1.67     Absolute Monocytes 08/19/2024 0.73     Eosinophils Absolute 08/19/2024 0.51     Basophils Absolute 08/19/2024 0.12 " (H)     Sodium 08/19/2024 138     Potassium 08/19/2024 3.9     Chloride 08/19/2024 102     CO2 08/19/2024 26     ANION GAP 08/19/2024 10     BUN 08/19/2024 7     Creatinine 08/19/2024 0.84     Glucose 08/19/2024 94     Calcium 08/19/2024 8.8     AST 08/19/2024 20     ALT 08/19/2024 25     Alkaline Phosphatase 08/19/2024 71     Total Protein 08/19/2024 7.1     Albumin 08/19/2024 3.9     Total Bilirubin 08/19/2024 0.51     eGFR 08/19/2024 80     Lipase 08/19/2024 12     Magnesium 08/19/2024 2.2     Color, UA 08/19/2024 Yellow     Clarity, UA 08/19/2024 Clear     Specific Gravity, UA 08/19/2024 1.010     pH, UA 08/19/2024 6.5     Leukocytes, UA 08/19/2024 Negative     Nitrite, UA 08/19/2024 Negative     Protein, UA 08/19/2024 Negative     Glucose, UA 08/19/2024 Negative     Ketones, UA 08/19/2024 Negative     Urobilinogen, UA 08/19/2024 <2.0     Bilirubin, UA 08/19/2024 Negative     Occult Blood, UA 08/19/2024 Trace (A)     CRP 08/19/2024 20.4 (H)     hs TnI 0hr 08/19/2024 54 (H)     Ventricular Rate 08/19/2024 98     Atrial Rate 08/19/2024 98     OH Interval 08/19/2024 130     QRSD Interval 08/19/2024 80     QT Interval 08/19/2024 374     QTC Interval 08/19/2024 477     P Axis 08/19/2024 32     QRS [place] 08/19/2024 29     T Wave [place] 08/19/2024 74     hs TnI 2hr 08/19/2024 <2     Delta 2hr hsTnI 08/19/2024 <-52     RBC, UA 08/19/2024 0-1     WBC, UA 08/19/2024 1-2     Epithelial Cells 08/19/2024 Occasional     Bacteria, UA 08/19/2024 Occasional     URINE COMMENT 08/19/2024 SOME SQUAMOUS EPITHELIUM SEEN IN SHEETS     hs TnI 4hr 08/19/2024 <2     Delta 4hr hsTnI 08/19/2024 <-52     Color, UA 08/19/2024 Colorless     Clarity, UA 08/19/2024 Clear     Specific Gravity, UA 08/19/2024 1.010     pH, UA 08/19/2024 5.5     Leukocytes, UA 08/19/2024 Negative     Nitrite, UA 08/19/2024 Negative     Protein, UA 08/19/2024 Negative     Glucose, UA 08/19/2024 300 (3/10%) (A)     Ketones, UA 08/19/2024 40 (2+) (A)     Urobilinogen, UA  08/19/2024 <2.0     Bilirubin, UA 08/19/2024 Negative     Occult Blood, UA 08/19/2024 Trace (A)     RBC, UA 08/19/2024 0-5 (A)     WBC, UA 08/19/2024 0-1 (A)     Epithelial Cells 08/19/2024 Occasional     Bacteria, UA 08/19/2024 None Seen     SARS-CoV-2 08/19/2024 Negative     INFLUENZA A PCR 08/19/2024 Negative     INFLUENZA B PCR 08/19/2024 Negative     RSV PCR 08/19/2024 Negative     Amph/Meth UR 08/19/2024 Negative     Barbiturate Ur 08/19/2024 Negative     Benzodiazepine Urine 08/19/2024 Negative     Cocaine Urine 08/19/2024 Negative     Methadone Urine 08/19/2024 Negative     Opiate Urine 08/19/2024 Negative     PCP Ur 08/19/2024 Negative     THC Urine 08/19/2024 Negative     Oxycodone Urine 08/19/2024 Negative     Fentanyl Urine 08/19/2024 Negative     HYDROCODONE URINE 08/19/2024 Negative     Sodium 08/20/2024 138     Potassium 08/20/2024 3.9     Chloride 08/20/2024 106     CO2 08/20/2024 22     ANION GAP 08/20/2024 10     BUN 08/20/2024 5     Creatinine 08/20/2024 0.60     Glucose 08/20/2024 137     Calcium 08/20/2024 8.2 (L)     AST 08/20/2024 16     ALT 08/20/2024 22     Alkaline Phosphatase 08/20/2024 68     Total Protein 08/20/2024 6.9     Albumin 08/20/2024 3.9     Total Bilirubin 08/20/2024 0.40     eGFR 08/20/2024 105        Imaging Studies: I have personally reviewed pertinent imaging studies.

## 2024-08-21 VITALS
OXYGEN SATURATION: 98 % | RESPIRATION RATE: 18 BRPM | TEMPERATURE: 97.8 F | DIASTOLIC BLOOD PRESSURE: 76 MMHG | HEART RATE: 84 BPM | WEIGHT: 164.68 LBS | SYSTOLIC BLOOD PRESSURE: 121 MMHG | BODY MASS INDEX: 27.44 KG/M2 | HEIGHT: 65 IN

## 2024-08-21 LAB
ALBUMIN SERPL BCG-MCNC: 3.3 G/DL (ref 3.5–5)
ALP SERPL-CCNC: 54 U/L (ref 34–104)
ALT SERPL W P-5'-P-CCNC: 17 U/L (ref 7–52)
ANION GAP SERPL CALCULATED.3IONS-SCNC: 7 MMOL/L (ref 4–13)
AST SERPL W P-5'-P-CCNC: 10 U/L (ref 13–39)
BACTERIA UR CULT: NORMAL
BASOPHILS # BLD AUTO: 0.07 THOUSANDS/ÂΜL (ref 0–0.1)
BASOPHILS NFR BLD AUTO: 1 % (ref 0–1)
BILIRUB SERPL-MCNC: 0.27 MG/DL (ref 0.2–1)
BUN SERPL-MCNC: 9 MG/DL (ref 5–25)
C COLI+JEJUNI TUF STL QL NAA+PROBE: NEGATIVE
CALCIUM ALBUM COR SERPL-MCNC: 8.6 MG/DL (ref 8.3–10.1)
CALCIUM SERPL-MCNC: 8 MG/DL (ref 8.4–10.2)
CHLORIDE SERPL-SCNC: 107 MMOL/L (ref 96–108)
CO2 SERPL-SCNC: 27 MMOL/L (ref 21–32)
CREAT SERPL-MCNC: 0.74 MG/DL (ref 0.6–1.3)
EC STX1+STX2 GENES STL QL NAA+PROBE: NEGATIVE
EOSINOPHIL # BLD AUTO: 0.15 THOUSAND/ÂΜL (ref 0–0.61)
EOSINOPHIL NFR BLD AUTO: 1 % (ref 0–6)
ERYTHROCYTE [DISTWIDTH] IN BLOOD BY AUTOMATED COUNT: 13.5 % (ref 11.6–15.1)
GFR SERPL CREATININE-BSD FRML MDRD: 94 ML/MIN/1.73SQ M
GLUCOSE SERPL-MCNC: 87 MG/DL (ref 65–140)
HCT VFR BLD AUTO: 38.3 % (ref 34.8–46.1)
HGB BLD-MCNC: 12.6 G/DL (ref 11.5–15.4)
IMM GRANULOCYTES # BLD AUTO: 0.04 THOUSAND/UL (ref 0–0.2)
IMM GRANULOCYTES NFR BLD AUTO: 0 % (ref 0–2)
LYMPHOCYTES # BLD AUTO: 2.65 THOUSANDS/ÂΜL (ref 0.6–4.47)
LYMPHOCYTES NFR BLD AUTO: 26 % (ref 14–44)
MCH RBC QN AUTO: 31.9 PG (ref 26.8–34.3)
MCHC RBC AUTO-ENTMCNC: 32.9 G/DL (ref 31.4–37.4)
MCV RBC AUTO: 97 FL (ref 82–98)
MONOCYTES # BLD AUTO: 1.07 THOUSAND/ÂΜL (ref 0.17–1.22)
MONOCYTES NFR BLD AUTO: 10 % (ref 4–12)
NEUTROPHILS # BLD AUTO: 6.4 THOUSANDS/ÂΜL (ref 1.85–7.62)
NEUTS SEG NFR BLD AUTO: 62 % (ref 43–75)
NRBC BLD AUTO-RTO: 0 /100 WBCS
PLATELET # BLD AUTO: 397 THOUSANDS/UL (ref 149–390)
PMV BLD AUTO: 9.3 FL (ref 8.9–12.7)
POTASSIUM SERPL-SCNC: 3.6 MMOL/L (ref 3.5–5.3)
PROT SERPL-MCNC: 5.8 G/DL (ref 6.4–8.4)
RBC # BLD AUTO: 3.95 MILLION/UL (ref 3.81–5.12)
SALMONELLA SP SPAO STL QL NAA+PROBE: NEGATIVE
SHIGELLA SP+EIEC IPAH STL QL NAA+PROBE: NEGATIVE
SODIUM SERPL-SCNC: 141 MMOL/L (ref 135–147)
WBC # BLD AUTO: 10.38 THOUSAND/UL (ref 4.31–10.16)

## 2024-08-21 PROCEDURE — 80053 COMPREHEN METABOLIC PANEL: CPT

## 2024-08-21 PROCEDURE — 85025 COMPLETE CBC W/AUTO DIFF WBC: CPT

## 2024-08-21 PROCEDURE — 99232 SBSQ HOSP IP/OBS MODERATE 35: CPT | Performed by: STUDENT IN AN ORGANIZED HEALTH CARE EDUCATION/TRAINING PROGRAM

## 2024-08-21 PROCEDURE — 99239 HOSP IP/OBS DSCHRG MGMT >30: CPT | Performed by: PHYSICIAN ASSISTANT

## 2024-08-21 RX ORDER — SODIUM CHLORIDE, SODIUM GLUCONATE, SODIUM ACETATE, POTASSIUM CHLORIDE, MAGNESIUM CHLORIDE, SODIUM PHOSPHATE, DIBASIC, AND POTASSIUM PHOSPHATE .53; .5; .37; .037; .03; .012; .00082 G/100ML; G/100ML; G/100ML; G/100ML; G/100ML; G/100ML; G/100ML
1000 INJECTION, SOLUTION INTRAVENOUS ONCE
Status: CANCELLED
Start: 2024-08-23

## 2024-08-21 RX ADMIN — FAMOTIDINE 20 MG: 10 INJECTION, SOLUTION INTRAVENOUS at 09:12

## 2024-08-21 RX ADMIN — DIPHENHYDRAMINE HYDROCHLORIDE 50 MG: 50 INJECTION, SOLUTION INTRAMUSCULAR; INTRAVENOUS at 03:20

## 2024-08-21 RX ADMIN — DIPHENHYDRAMINE HYDROCHLORIDE 50 MG: 50 INJECTION, SOLUTION INTRAMUSCULAR; INTRAVENOUS at 07:32

## 2024-08-21 RX ADMIN — METHYLPREDNISOLONE SODIUM SUCCINATE 40 MG: 40 INJECTION, POWDER, FOR SOLUTION INTRAMUSCULAR; INTRAVENOUS at 09:16

## 2024-08-21 NOTE — CASE MANAGEMENT
Case Management Discharge Planning Note    Patient name Celine Roa  Location /404-01 MRN [id number]  : 1975 Date 2024       Current Admission Date: 2024  Current Admission Diagnosis:Crohn disease (HCC)   Patient Active Problem List    Diagnosis Date Noted Date Diagnosed    Proctocolitis 2024     Sedative, hypnotic or anxiolytic use, unspecified with unspecified sedative, hypnotic or anxiolytic-induced disorder (HCC) 2024     Chronic RUQ pain 2024     Physical deconditioning 2024     Urticaria 2024     Oral phase dysphagia 2024     Pancreatitis 2024     Pollen-food allergy 2024     Oral allergy syndrome 2024     History of peripheral edema 2024     Hepatitis B antibody positive 2024     H/o Lyme disease 2024     Genetic disorder 2024     Endometriosis 2024     Anxiety 2024     Weight disorder 2024     Hepatitis 2024     Bladder spasm 02/10/2024     Intractable right upper quadrant abdominal pain 2024     Common variable immunodeficiency (HCC) 2024     Iron deficiency 2023     Iron deficiency anemia due to chronic blood loss 2023     Depression due to physical illness 2023     Circadian rhythm sleep disorder 2023     Insomnia due to medical condition 2023     Recurrent major depressive disorder, in partial remission (HCC) 2023     Medical clearance for psychiatric admission 2023     Seasonal allergies 2023     GERD without esophagitis 2023     Major depressive disorder with psychotic features (HCC) 2023     Benzodiazepine misuse 2023     Passive suicidal ideations 2023     Encephalopathy chronic 2023     Systemic lupus erythematosus, unspecified SLE type, unspecified organ involvement status (HCC) 2023     Bladder tumor 2023     Seizure (formerly [facility])      Allergic reaction 2023      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     Immunosuppression due to chronic steroid use ([facility]) 08/05/2022     Intractable migraine without status migrainosus 04/09/2022     Multiple allergies 04/09/2022     Crohn disease ([facility]) 04/09/2022     Traumatic brain injury ([facility]) 04/09/2022     Osteopenia 04/09/2022     Immunocompromised ([facility]) 04/09/2022     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     Hiatal hernia 04/09/2022     Ulcerative colitis ([facility]) 04/05/2022     Small fiber neuropathy 04/05/2022     Immunosuppression ([facility]) 04/05/2022     Atrial tachycardia 02/24/2022     Nephrolithiasis 12/06/2021     Anaphylactic reaction 12/04/2021     Intractable nausea and vomiting 12/02/2021     Heart palpitations 03/26/2021     Inflammatory bowel disease 03/07/2021     Current chronic use of systemic steroids 01/28/2021     MS (multiple sclerosis) ([facility]) 01/28/2021     Migraine      Overweight (BMI 25.0-29.9) 01/20/2021     Anorexia 10/17/2020     Crohn's colitis ([facility]) 10/17/2020     Hashimoto's disease 10/05/2020     Mild protein-calorie malnutrition ([facility]) 09/12/2019     Constipation 09/10/2019     Mass of left axilla 08/30/2019     Proctitis 08/26/2019     Essential (hemorrhagic) thrombocythemia ([facility])      Thrush 02/12/2019     Chronic back pain 02/12/2019     Primary localized osteoarthrosis of ankle and foot 06/04/2018     Fibromyalgia 12/28/2017     Meniere's disease 11/29/2017     Leukocytosis 09/10/2017     Hypomagnesemia 08/26/2017     Generalized anxiety disorder 08/26/2017     Vomiting and diarrhea 08/25/2017     Vitamin D deficiency 08/25/2017     Throat tightness 08/25/2017     Right upper quadrant abdominal pain 08/25/2017     Disorder of synovium 06/23/2017     Chondromalacia 05/12/2017     Chronic idiopathic urticaria 05/02/2017     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HCC) 05/19/2016     Acquired hypothyroidism 01/13/2014       LOS (days): 0  Geometric Mean LOS (GMLOS) (days):   Days to GMLOS:     OBJECTIVE:             Current admission status: Observation   Preferred Pharmacy:   [facility] - [place]OBS[place] - [address]  [address]  [place] [address]  Phone: [phone] Fax: [phone]    [facility] - [place] - [address]  [address] [place] [address]  Phone: [phone] Fax: [phone]    [place], [place] - [address] [facility]  [address] [place] [address]  Phone: [phone] Fax: [phone]    [facility] [address] [facility],  [address] [facility] [address] [address] [facility],  First [address] [facility] [address]  Phone: [phone] Fax: [phone]    Primary Care Provider: Olaf Rhodes MD    Primary Insurance: [facility]  Secondary Insurance: MEDICARE    DISCHARGE DETAILS:patient discharging home. No discharge needs noted. Nurse to review AVS, all follow up providers listed. Per GI note pt has GI appt 10/9.

## 2024-08-21 NOTE — ASSESSMENT & PLAN NOTE
50 yo female PMHx mast cell disease, corn allergy, GERD, IBS-D, Crohn on IV infusions presents with abdominal pain, rectal bleeding,  diarrhea and decreased oral intake in setting of Crohn's flare with proctocolitis   History of Crohn's disease on Skyrizi as well as methotrexate  CT abd/pelvis: findings suggestive of proctocolitis  H&H stable. No further rectal bleeding or BM since admission  Appreciate GI consult - stable from a GI standpoint for discharge  Diet advanced to surgical soft for lunch  Continue IVF hydration  Continue IV steroids, IV Benadryl   Stool studies ordered  Discharge home, patient feels well today

## 2024-08-21 NOTE — ASSESSMENT & PLAN NOTE
Continue outpatient immunology follow-up with [facility]  Patient reports intolerance to all medications with corn   Can transition to home oral regimen on discharge

## 2024-08-21 NOTE — NURSING NOTE
Discharge instructions provided .Verbalized understanding. Discharged to home in stable condition.

## 2024-08-21 NOTE — PLAN OF CARE
Problem: PAIN - ADULT  Goal: Verbalizes/displays adequate comfort level or baseline comfort level  Description: Interventions:  - Encourage patient to monitor pain and request assistance  - Assess pain using appropriate pain scale  - Administer analgesics based on type and severity of pain and evaluate response  - Implement non-pharmacological measures as appropriate and evaluate response  - Consider cultural and social influences on pain and pain management  - Notify physician/advanced practitioner if interventions unsuccessful or patient reports new pain  Outcome: Progressing     Problem: INFECTION - ADULT  Goal: Absence or prevention of progression during hospitalization  Description: INTERVENTIONS:  - Assess and monitor for signs and symptoms of infection  - Monitor lab/diagnostic results  - Monitor all insertion sites, i.e. indwelling lines, tubes, and drains  - Monitor endotracheal if appropriate and nasal secretions for changes in amount and color  - [place] appropriate cooling/warming therapies per order  - Administer medications as ordered  - Instruct and encourage patient and family to use good hand hygiene technique  - Identify and instruct in appropriate isolation precautions for identified infection/condition  Outcome: Progressing  Goal: Absence of fever/infection during neutropenic period  Description: INTERVENTIONS:  - Monitor WBC    Outcome: Progressing     Problem: SAFETY ADULT  Goal: Patient will remain free of falls  Description: INTERVENTIONS:  - Educate patient/family on patient safety including physical limitations  - Instruct patient to call for assistance with activity   - Consult OT/PT to assist with strengthening/mobility   - Keep Call bell within reach  - Keep bed low and locked with side rails adjusted as appropriate  - Keep care items and personal belongings within reach  - Initiate and maintain comfort rounds  - Make Fall Risk Sign visible to staff  - Offer Toileting every 2 Hours,  in advance of need  - Initiate/Maintain comfort  rounds  - Obtain necessary fall risk management equipment: call bell  - Apply yellow socks and bracelet for high fall risk patients  - Consider moving patient to room near nurses station  Outcome: Progressing     Problem: DISCHARGE PLANNING  Goal: Discharge to home or other facility with appropriate resources  Description: INTERVENTIONS:  - Identify barriers to discharge w/patient and caregiver  - Arrange for needed discharge resources and transportation as appropriate  - Identify discharge learning needs (meds, wound care, etc.)  - Arrange for interpretive services to assist at discharge as needed  - Refer to Case Management Department for coordinating discharge planning if the patient needs post-hospital services based on physician/advanced practitioner order or complex needs related to functional status, cognitive ability, or social support system  Outcome: Progressing     Problem: Knowledge Deficit  Goal: Patient/family/caregiver demonstrates understanding of disease process, treatment plan, medications, and discharge instructions  Description: Complete learning assessment and assess knowledge base.  Interventions:  - Provide teaching at level of understanding  - Provide teaching via preferred learning methods  Outcome: Progressing     Problem: GASTROINTESTINAL - ADULT  Goal: Minimal or absence of nausea and/or vomiting  Description: INTERVENTIONS:  - Administer IV fluids if ordered to ensure adequate hydration  - Maintain NPO status until nausea and vomiting are resolved  - Nasogastric tube if ordered  - Administer ordered antiemetic medications as needed  - Provide nonpharmacologic comfort measures as appropriate  - Advance diet as tolerated, if ordered  - Consider nutrition services referral to assist patient with adequate nutrition and appropriate food choices  Outcome: Progressing  Goal: Maintains or returns to baseline bowel function  Description:  INTERVENTIONS:  - Assess bowel function  - Encourage oral fluids to ensure adequate hydration  - Administer IV fluids if ordered to ensure adequate hydration  - Administer ordered medications as needed  - Encourage mobilization and activity  - Consider nutritional services referral to assist patient with adequate nutrition and appropriate food choices  Outcome: Progressing  Goal: Maintains adequate nutritional intake  Description: INTERVENTIONS:  - Monitor percentage of each meal consumed  - Identify factors contributing to decreased intake, treat as appropriate  - Assist with meals as needed  - Monitor I&O, weight, and lab values if indicated  - Obtain nutrition services referral as needed  Outcome: Progressing     Problem: Nutrition/Hydration-ADULT  Goal: Nutrient/Hydration intake appropriate for improving, restoring or maintaining nutritional needs  Description: Monitor and assess patient's nutrition/hydration status for malnutrition. Collaborate with interdisciplinary team and initiate plan and interventions as ordered.  Monitor patient's weight and dietary intake as ordered or per policy. Utilize nutrition screening tool and intervene as necessary. Determine patient's food preferences and provide high-protein, high-caloric foods as appropriate.     INTERVENTIONS:  - Monitor oral intake, urinary output, labs, and treatment plans  - Assess nutrition and hydration status and recommend course of action  - Evaluate amount of meals eaten  - Assist patient with eating if necessary   - Allow adequate time for meals  - Recommend/ encourage appropriate diets, oral nutritional supplements, and vitamin/mineral supplements  - Order, calculate, and assess calorie counts as needed  - Recommend, monitor, and adjust tube feedings and TPN/PPN based on assessed needs  - Assess need for intravenous fluids  - Provide specific nutrition/hydration education as appropriate  - Include patient/family/caregiver in decisions related to  nutrition  Outcome: Progressing

## 2024-08-21 NOTE — PROGRESS NOTES
Progress Note- Celine Roa 49 y.o. female MRN: [id number]    Unit/Bed#: 404-01 Encounter: [id number]      Assessment and Plan:    Serology: Calcium 8.0, AST 10, total protein 5.8, albumin 3.3, WBCs 10.38, platelets 397.    Exam: Pt was sitting up in bed comfortably during today's exam, A&O x 3, pleasant and cooperative. Abdomen is soft, nondistended, nontender, with hypoactive bowel sounds x 4. Skin is non-icteric.  No edema noted of the b/l lower extremities upon exam today.     Crohn's disease  Proctocolitis  Abdominal pain  Nausea/vomiting  Bloody diarrhea  Improving/Stable    Continue current diet as ordered.   Continue to monitor stool output for any overt signs of GI bleeding.   Continue famotidine as ordered.   Continue diphenhydramine and hyoscyamine as needed.  Continue rest of medications as per primary team.   Continue supportive care.     From a GI standpoint she is cleared for discharge and already has a follow-up office visit on October 9, 2024 with Dr. Levine and is to follow-up with him regarding hospital follow-up and to discuss proceeding with her outpatient colonoscopy.  The patient was agreeable and verbalized an understanding.    GI will sign off.  __________________________________________________________________    Subjective:     Patient is a 49 y.o. female who is currently being seen for her Crohn's disease, proctocolitis, abdominal pain, nausea/vomiting, and bloody diarrhea.  Since yesterday the patient reports significant improvement in her overall symptoms with very minimal abdominal pain, nausea, or vomiting.  The patient had 2 formed bowel movements yesterday after eating regular meals with scant amounts of blood, but denies any melena.  The patient denied any blood when wiping.  She is currently tolerating a regular diet without any issues and is hoping that she is able to be discharged home later this afternoon.    Medication Administration - last 24 hours from 08/20/2024 0852 to  "08/21/2024 0852         Date/Time Order Dose Route Action Action by     08/20/2024 2151 EDT multi-electrolyte (PLASMALYTE-A/ISOLYTE-S PH 7.4) IV solution 125 mL/hr Intravenous New Bag Elizabeth Yañez, FLORIAN     08/20/2024 1323 EDT multi-electrolyte (PLASMALYTE-A/ISOLYTE-S PH 7.4) IV solution 125 mL/hr Intravenous New Bag Kristi Terrell, ROLLY     08/20/2024 2151 EDT Famotidine (PF) (PEPCID) injection 20 mg 20 mg Intravenous Given Komal Zurita     08/21/2024 0732 EDT diphenhydrAMINE (BENADRYL) injection 50 mg 50 mg Intravenous Given Tami Guerrero, ROLLY     08/21/2024 0320 EDT diphenhydrAMINE (BENADRYL) injection 50 mg 50 mg Intravenous Given Sarah Wu, ROLLY     08/20/2024 2151 EDT diphenhydrAMINE (BENADRYL) injection 50 mg 50 mg Intravenous Given Komal Zurita     08/20/2024 1729 EDT diphenhydrAMINE (BENADRYL) injection 50 mg 50 mg Intravenous Given Zaki Alexander, ROLLY     08/20/2024 1323 EDT diphenhydrAMINE (BENADRYL) injection 50 mg 50 mg Intravenous Given Kristi Terrell RN            Objective:     Vitals: Blood pressure 121/76, pulse 84, temperature 97.8 °F (36.6 °C), temperature source Oral, resp. rate 18, height 5' 5\" (1.651 m), weight 74.7 kg (164 lb 10.9 oz), SpO2 98%, not currently breastfeeding.,Body mass index is 27.4 kg/m².      Intake/Output Summary (Last 24 hours) at 8/21/2024 0852  Last data filed at 8/20/2024 1323  Gross per 24 hour   Intake 2502.92 ml   Output --   Net 2502.92 ml       Physical Exam:   General Appearance: Awake and alert, in no acute distress  Abdomen: Soft, non-tender, non-distended; bowel sounds normal; no masses or no organomegaly    Invasive Devices       Peripheral Intravenous Line  Duration             Peripheral IV 08/19/24 Left;Upper Arm 1 day              Line  Duration             Pump Device  -- days                    Lab Results:  Admission on 08/19/2024   Component Date Value    WBC 08/19/2024 8.86     RBC 08/19/2024 4.77     Hemoglobin 08/19/2024 15.2     " Hematocrit 08/19/2024 45.8     MCV 08/19/2024 96     MCH 08/19/2024 31.9     MCHC 08/19/2024 33.2     RDW 08/19/2024 13.2     MPV 08/19/2024 8.7 (L)     Platelets 08/19/2024 449 (H)     nRBC 08/19/2024 0     Segmented % 08/19/2024 66     Immature Grans % 08/19/2024 0     Lymphocytes % 08/19/2024 19     Monocytes % 08/19/2024 8     Eosinophils Relative 08/19/2024 6     Basophils Relative 08/19/2024 1     Absolute Neutrophils 08/19/2024 5.80     Absolute Immature Grans 08/19/2024 0.03     Absolute Lymphocytes 08/19/2024 1.67     Absolute Monocytes 08/19/2024 0.73     Eosinophils Absolute 08/19/2024 0.51     Basophils Absolute 08/19/2024 0.12 (H)     Sodium 08/19/2024 138     Potassium 08/19/2024 3.9     Chloride 08/19/2024 102     CO2 08/19/2024 26     ANION GAP 08/19/2024 10     BUN 08/19/2024 7     Creatinine 08/19/2024 0.84     Glucose 08/19/2024 94     Calcium 08/19/2024 8.8     AST 08/19/2024 20     ALT 08/19/2024 25     Alkaline Phosphatase 08/19/2024 71     Total Protein 08/19/2024 7.1     Albumin 08/19/2024 3.9     Total Bilirubin 08/19/2024 0.51     eGFR 08/19/2024 80     Lipase 08/19/2024 12     Magnesium 08/19/2024 2.2     Color, UA 08/19/2024 Yellow     Clarity, UA 08/19/2024 Clear     Specific Gravity, UA 08/19/2024 1.010     pH, UA 08/19/2024 6.5     Leukocytes, UA 08/19/2024 Negative     Nitrite, UA 08/19/2024 Negative     Protein, UA 08/19/2024 Negative     Glucose, UA 08/19/2024 Negative     Ketones, UA 08/19/2024 Negative     Urobilinogen, UA 08/19/2024 <2.0     Bilirubin, UA 08/19/2024 Negative     Occult Blood, UA 08/19/2024 Trace (A)     CRP 08/19/2024 20.4 (H)     hs TnI 0hr 08/19/2024 54 (H)     Ventricular Rate 08/19/2024 98     Atrial Rate 08/19/2024 98     VT Interval 08/19/2024 130     QRSD Interval 08/19/2024 80     QT Interval 08/19/2024 374     QTC Interval 08/19/2024 477     P Axis 08/19/2024 32     QRS [place] 08/19/2024 29     T Wave [place] 08/19/2024 74     hs TnI 2hr 08/19/2024 <2      Delta 2hr hsTnI 08/19/2024 <-52     RBC, UA 08/19/2024 0-1     WBC, UA 08/19/2024 1-2     Epithelial Cells 08/19/2024 Occasional     Bacteria, UA 08/19/2024 Occasional     URINE COMMENT 08/19/2024 SOME SQUAMOUS EPITHELIUM SEEN IN SHEETS     hs TnI 4hr 08/19/2024 <2     Delta 4hr hsTnI 08/19/2024 <-52     Color, UA 08/19/2024 Colorless     Clarity, UA 08/19/2024 Clear     Specific Gravity, UA 08/19/2024 1.010     pH, UA 08/19/2024 5.5     Leukocytes, UA 08/19/2024 Negative     Nitrite, UA 08/19/2024 Negative     Protein, UA 08/19/2024 Negative     Glucose, UA 08/19/2024 300 (3/10%) (A)     Ketones, UA 08/19/2024 40 (2+) (A)     Urobilinogen, UA 08/19/2024 <2.0     Bilirubin, UA 08/19/2024 Negative     Occult Blood, UA 08/19/2024 Trace (A)     RBC, UA 08/19/2024 0-5 (A)     WBC, UA 08/19/2024 0-1 (A)     Epithelial Cells 08/19/2024 Occasional     Bacteria, UA 08/19/2024 None Seen     SARS-CoV-2 08/19/2024 Negative     INFLUENZA A PCR 08/19/2024 Negative     INFLUENZA B PCR 08/19/2024 Negative     RSV PCR 08/19/2024 Negative     Amph/Meth UR 08/19/2024 Negative     Barbiturate Ur 08/19/2024 Negative     Benzodiazepine Urine 08/19/2024 Negative     Cocaine Urine 08/19/2024 Negative     Methadone Urine 08/19/2024 Negative     Opiate Urine 08/19/2024 Negative     PCP Ur 08/19/2024 Negative     THC Urine 08/19/2024 Negative     Oxycodone Urine 08/19/2024 Negative     Fentanyl Urine 08/19/2024 Negative     HYDROCODONE URINE 08/19/2024 Negative     Sodium 08/20/2024 138     Potassium 08/20/2024 3.9     Chloride 08/20/2024 106     CO2 08/20/2024 22     ANION GAP 08/20/2024 10     BUN 08/20/2024 5     Creatinine 08/20/2024 0.60     Glucose 08/20/2024 137     Calcium 08/20/2024 8.2 (L)     AST 08/20/2024 16     ALT 08/20/2024 22     Alkaline Phosphatase 08/20/2024 68     Total Protein 08/20/2024 6.9     Albumin 08/20/2024 3.9     Total Bilirubin 08/20/2024 0.40     eGFR 08/20/2024 105     WBC 08/21/2024 10.38 (H)     RBC  08/21/2024 3.95     Hemoglobin 08/21/2024 12.6     Hematocrit 08/21/2024 38.3     MCV 08/21/2024 97     MCH 08/21/2024 31.9     MCHC 08/21/2024 32.9     RDW 08/21/2024 13.5     MPV 08/21/2024 9.3     Platelets 08/21/2024 397 (H)     nRBC 08/21/2024 0     Segmented % 08/21/2024 62     Immature Grans % 08/21/2024 0     Lymphocytes % 08/21/2024 26     Monocytes % 08/21/2024 10     Eosinophils Relative 08/21/2024 1     Basophils Relative 08/21/2024 1     Absolute Neutrophils 08/21/2024 6.40     Absolute Immature Grans 08/21/2024 0.04     Absolute Lymphocytes 08/21/2024 2.65     Absolute Monocytes 08/21/2024 1.07     Eosinophils Absolute 08/21/2024 0.15     Basophils Absolute 08/21/2024 0.07     Sodium 08/21/2024 141     Potassium 08/21/2024 3.6     Chloride 08/21/2024 107     CO2 08/21/2024 27     ANION GAP 08/21/2024 7     BUN 08/21/2024 9     Creatinine 08/21/2024 0.74     Glucose 08/21/2024 87     Calcium 08/21/2024 8.0 (L)     Corrected Calcium 08/21/2024 8.6     AST 08/21/2024 10 (L)     ALT 08/21/2024 17     Alkaline Phosphatase 08/21/2024 54     Total Protein 08/21/2024 5.8 (L)     Albumin 08/21/2024 3.3 (L)     Total Bilirubin 08/21/2024 0.27     eGFR 08/21/2024 94        Imaging Studies: I have personally reviewed pertinent imaging studies.

## 2024-08-21 NOTE — PLAN OF CARE
Problem: PAIN - ADULT  Goal: Verbalizes/displays adequate comfort level or baseline comfort level  Description: Interventions:  - Encourage patient to monitor pain and request assistance  - Assess pain using appropriate pain scale  - Administer analgesics based on type and severity of pain and evaluate response  - Implement non-pharmacological measures as appropriate and evaluate response  - Consider cultural and social influences on pain and pain management  - Notify physician/advanced practitioner if interventions unsuccessful or patient reports new pain  Outcome: Progressing     Problem: INFECTION - ADULT  Goal: Absence or prevention of progression during hospitalization  Description: INTERVENTIONS:  - Assess and monitor for signs and symptoms of infection  - Monitor lab/diagnostic results  - Monitor all insertion sites, i.e. indwelling lines, tubes, and drains  - Monitor endotracheal if appropriate and nasal secretions for changes in amount and color  - [place] appropriate cooling/warming therapies per order  - Administer medications as ordered  - Instruct and encourage patient and family to use good hand hygiene technique  - Identify and instruct in appropriate isolation precautions for identified infection/condition  Outcome: Progressing  Goal: Absence of fever/infection during neutropenic period  Description: INTERVENTIONS:  - Monitor WBC    Outcome: Progressing     Problem: SAFETY ADULT  Goal: Patient will remain free of falls  Description: INTERVENTIONS:  - Educate patient/family on patient safety including physical limitations  - Instruct patient to call for assistance with activity   - Consult OT/PT to assist with strengthening/mobility   - Keep Call bell within reach  - Keep bed low and locked with side rails adjusted as appropriate  - Keep care items and personal belongings within reach  - Initiate and maintain comfort rounds  - Make Fall Risk Sign visible to staff  - Offer Toileting every 2 Hours,  in advance of need  - Initiate/Maintain comfort  rounds  - Obtain necessary fall risk management equipment: call bell  - Apply yellow socks and bracelet for high fall risk patients  - Consider moving patient to room near nurses station  Outcome: Progressing     Problem: DISCHARGE PLANNING  Goal: Discharge to home or other facility with appropriate resources  Description: INTERVENTIONS:  - Identify barriers to discharge w/patient and caregiver  - Arrange for needed discharge resources and transportation as appropriate  - Identify discharge learning needs (meds, wound care, etc.)  - Arrange for interpretive services to assist at discharge as needed  - Refer to Case Management Department for coordinating discharge planning if the patient needs post-hospital services based on physician/advanced practitioner order or complex needs related to functional status, cognitive ability, or social support system  Outcome: Progressing     Problem: Knowledge Deficit  Goal: Patient/family/caregiver demonstrates understanding of disease process, treatment plan, medications, and discharge instructions  Description: Complete learning assessment and assess knowledge base.  Interventions:  - Provide teaching at level of understanding  - Provide teaching via preferred learning methods  Outcome: Progressing     Problem: GASTROINTESTINAL - ADULT  Goal: Minimal or absence of nausea and/or vomiting  Description: INTERVENTIONS:  - Administer IV fluids if ordered to ensure adequate hydration  - Maintain NPO status until nausea and vomiting are resolved  - Nasogastric tube if ordered  - Administer ordered antiemetic medications as needed  - Provide nonpharmacologic comfort measures as appropriate  - Advance diet as tolerated, if ordered  - Consider nutrition services referral to assist patient with adequate nutrition and appropriate food choices  Outcome: Progressing  Goal: Maintains or returns to baseline bowel function  Description:  INTERVENTIONS:  - Assess bowel function  - Encourage oral fluids to ensure adequate hydration  - Administer IV fluids if ordered to ensure adequate hydration  - Administer ordered medications as needed  - Encourage mobilization and activity  - Consider nutritional services referral to assist patient with adequate nutrition and appropriate food choices  Outcome: Progressing  Goal: Maintains adequate nutritional intake  Description: INTERVENTIONS:  - Monitor percentage of each meal consumed  - Identify factors contributing to decreased intake, treat as appropriate  - Assist with meals as needed  - Monitor I&O, weight, and lab values if indicated  - Obtain nutrition services referral as needed  Outcome: Progressing     Problem: Nutrition/Hydration-ADULT  Goal: Nutrient/Hydration intake appropriate for improving, restoring or maintaining nutritional needs  Description: Monitor and assess patient's nutrition/hydration status for malnutrition. Collaborate with interdisciplinary team and initiate plan and interventions as ordered.  Monitor patient's weight and dietary intake as ordered or per policy. Utilize nutrition screening tool and intervene as necessary. Determine patient's food preferences and provide high-protein, high-caloric foods as appropriate.     INTERVENTIONS:  - Monitor oral intake, urinary output, labs, and treatment plans  - Assess nutrition and hydration status and recommend course of action  - Evaluate amount of meals eaten  - Assist patient with eating if necessary   - Allow adequate time for meals  - Recommend/ encourage appropriate diets, oral nutritional supplements, and vitamin/mineral supplements  - Order, calculate, and assess calorie counts as needed  - Recommend, monitor, and adjust tube feedings and TPN/PPN based on assessed needs  - Assess need for intravenous fluids  - Provide specific nutrition/hydration education as appropriate  - Include patient/family/caregiver in decisions related to  nutrition  Outcome: Progressing

## 2024-08-21 NOTE — DISCHARGE SUMMARY
"[facility]  Discharge- Celine Roa 1975, 49 y.o. female MRN: [id number]  Unit/Bed#: 404-01 Encounter: [id number]  Primary Care Provider: Olaf Rhodes MD   Date and time admitted to hospital: 8/19/2024 11:40 AM    * Crohn disease (HCC)  Assessment & Plan  50 yo female PMHx mast cell disease, corn allergy, GERD, IBS-D, Crohn on IV infusions presents with abdominal pain, rectal bleeding,  diarrhea and decreased oral intake in setting of Crohn's flare with proctocolitis   History of Crohn's disease on Skyrizi as well as methotrexate  CT abd/pelvis: findings suggestive of proctocolitis  H&H stable. No further rectal bleeding or BM since admission  Appreciate GI consult - stable from a GI standpoint for discharge  Diet advanced to surgical soft for lunch  Continue IVF hydration  Continue IV steroids, IV Benadryl   Stool studies ordered  Discharge home, patient feels well today      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HCC)  Assessment & Plan  Continue outpatient immunology follow-up with [facility]  Patient reports intolerance to all medications with corn   Can transition to home oral regimen on discharge    Proctocolitis  Assessment & Plan  See management as above under \"Crohn disease\"      Depression due to physical illness  Assessment & Plan  Cont on home regimen        Medical Problems       Resolved Problems  Date Reviewed: 8/20/2024   None       Discharging Physician / Practitioner: Teresa Nuñez PA-C  PCP: Olaf Rhodes MD  Admission Date:   Admission Orders (From admission, onward)       Ordered        08/19/24 1730  Place in Observation  Once                          Discharge Date: 08/21/24    Consultations During Hospital Stay:  GI    Procedures Performed:   none    Significant Findings / Test Results:     CT abdomen pelvis without contrast   Final Result      Findings suggestive of proctocolitis.   Bilateral nephrolithiasis.   Hepatomegaly.      The study was " "marked in EPIC for immediate notification.      Workstation performed: [id number]               Incidental Findings:   none    Test Results Pending at Discharge (will require follow up):   none     Outpatient Tests Requested:  none    Complications:  none    Reason for Admission: crohns flare    Hospital Course:   Celine Roa is a 49 y.o. female patient who originally presented to the hospital on 8/19/2024 due to  worsening diarrhea, nausea and vomiting for about 2 wks. Pt states she has been having some bloody bowel movement though 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 not noticed any bloody movement but she states this is due to the fact she has not had anything to eat so her diarrhea has subsided. Her stool now is apple colored. She also started having abdominal cramps for the last 2 days. No fever or chills. She has had decreased oral intake and feels like her mast cell disease causing her to have worsening symptoms when dehydrated. She states normally she has these events she gets admitted to be rehydrated and also treated with steroids. She states she is has severe allergic and anaphylaxis to corn and this is present in multiple medications and sometimes cause her symptoms to be worse. ED discussed the case with GI and recommendation is to admit if cannot tolerate PO. Her CT showing proctocolitis.            Please see above list of diagnoses and related plan for additional information.     Condition at Discharge: good    Discharge Day Visit / Exam:   Subjective:  patient denies any abdominal pain. Denies hematochezia/melena. Feels ready to go home  Vitals: Blood Pressure: 121/76 (08/21/24 0729)  Pulse: 84 (08/21/24 0729)  Temperature: 97.8 °F (36.6 °C) (08/21/24 0729)  Temp Source: Oral (08/21/24 0729)  Respirations: 18 (08/21/24 0729)  Height: 5' 5\" (165.1 cm) (08/19/24 1911)  Weight - Scale: 74.7 kg (164 lb 10.9 oz) (08/19/24 1911)  SpO2: 98 % (08/21/24 0743)  Exam:   Physical Exam  Vitals and nursing note reviewed. "   Constitutional:       General: She is not in acute distress.     Appearance: She is not ill-appearing.   Cardiovascular:      Rate and Rhythm: Normal rate and regular rhythm.      Pulses: Normal pulses.      Heart sounds: Normal heart sounds.   Pulmonary:      Effort: Pulmonary effort is normal.      Breath sounds: Normal breath sounds.   Abdominal:      General: Bowel sounds are normal.      Palpations: Abdomen is soft.      Tenderness: There is no abdominal tenderness.   Musculoskeletal:      Right lower leg: No edema.      Left lower leg: No edema.   Neurological:      Mental Status: She is alert and oriented to person, place, and time. Mental status is at baseline.   Psychiatric:         Mood and Affect: Mood normal.         Behavior: Behavior normal.          Discussion with Family: Patient declined call to .     Discharge instructions/Information to patient and family:   See after visit summary for information provided to patient and family.      Provisions for Follow-Up Care:  See after visit summary for information related to follow-up care and any pertinent home health orders.      Mobility at time of Discharge:   Basic Mobility Inpatient Raw Score: 24  JH-HLM Goal: 8: Walk 250 feet or more  JH-HLM Achieved: 8: Walk 250 feet ot more  HLM Goal achieved. Continue to encourage appropriate mobility.     Disposition:   Home    Planned Readmission: none     Discharge Statement:  I spent 35 minutes discharging the patient. This time was spent on the day of discharge. I had direct contact with the patient on the day of discharge. Greater than 50% of the total time was spent examining patient, answering all patient questions, arranging and discussing plan of care with patient as well as directly providing post-discharge instructions.  Additional time then spent on discharge activities.    Discharge Medications:  See after visit summary for reconciled discharge medications provided to patient and/or  family.      **Please Note: This note may have been constructed using a voice recognition system**

## 2024-08-22 ENCOUNTER — TRANSITIONAL CARE MANAGEMENT (OUTPATIENT)
Dept: FAMILY MEDICINE CLINIC | Facility: CLINIC | Age: 49
End: 2024-08-22

## 2024-08-22 LAB — CALPROTECTIN STL-MCNC: 3300 ÂΜG/G

## 2024-08-23 ENCOUNTER — HOSPITAL ENCOUNTER (OUTPATIENT)
Dept: INFUSION CENTER | Facility: HOSPITAL | Age: 49
End: 2024-08-23
Payer: COMMERCIAL

## 2024-08-23 VITALS
SYSTOLIC BLOOD PRESSURE: 119 MMHG | HEART RATE: 90 BPM | TEMPERATURE: 97.6 F | RESPIRATION RATE: 18 BRPM | DIASTOLIC BLOOD PRESSURE: 86 MMHG

## 2024-08-23 DIAGNOSIS — R11.10 VOMITING AND DIARRHEA: Primary | ICD-10-CM

## 2024-08-23 DIAGNOSIS — R63.0 ANOREXIA: ICD-10-CM

## 2024-08-23 DIAGNOSIS — D50.0 IRON DEFICIENCY ANEMIA DUE TO CHRONIC BLOOD LOSS: ICD-10-CM

## 2024-08-23 DIAGNOSIS — E61.1 IRON DEFICIENCY: ICD-10-CM

## 2024-08-23 DIAGNOSIS — E44.1 MILD PROTEIN-CALORIE MALNUTRITION (HCC): ICD-10-CM

## 2024-08-23 DIAGNOSIS — R19.7 VOMITING AND DIARRHEA: Primary | ICD-10-CM

## 2024-08-23 PROCEDURE — 96365 THER/PROPH/DIAG IV INF INIT: CPT

## 2024-08-23 PROCEDURE — 96366 THER/PROPH/DIAG IV INF ADDON: CPT

## 2024-08-23 PROCEDURE — 96368 THER/DIAG CONCURRENT INF: CPT

## 2024-08-23 PROCEDURE — 96375 TX/PRO/DX INJ NEW DRUG ADDON: CPT

## 2024-08-23 RX ORDER — SODIUM CHLORIDE, SODIUM GLUCONATE, SODIUM ACETATE, POTASSIUM CHLORIDE, MAGNESIUM CHLORIDE, SODIUM PHOSPHATE, DIBASIC, AND POTASSIUM PHOSPHATE .53; .5; .37; .037; .03; .012; .00082 G/100ML; G/100ML; G/100ML; G/100ML; G/100ML; G/100ML; G/100ML
1000 INJECTION, SOLUTION INTRAVENOUS ONCE
Status: CANCELLED
Start: 2024-09-03 | End: 2024-08-24

## 2024-08-23 RX ORDER — SODIUM CHLORIDE, SODIUM GLUCONATE, SODIUM ACETATE, POTASSIUM CHLORIDE, MAGNESIUM CHLORIDE, SODIUM PHOSPHATE, DIBASIC, AND POTASSIUM PHOSPHATE .53; .5; .37; .037; .03; .012; .00082 G/100ML; G/100ML; G/100ML; G/100ML; G/100ML; G/100ML; G/100ML
1000 INJECTION, SOLUTION INTRAVENOUS ONCE
Status: COMPLETED | OUTPATIENT
Start: 2024-08-23 | End: 2024-08-23

## 2024-08-23 RX ORDER — METHYLPREDNISOLONE SODIUM SUCCINATE 40 MG/ML
40 INJECTION, POWDER, LYOPHILIZED, FOR SOLUTION INTRAMUSCULAR; INTRAVENOUS ONCE
Status: COMPLETED | OUTPATIENT
Start: 2024-08-23 | End: 2024-08-23

## 2024-08-23 RX ORDER — METHYLPREDNISOLONE SODIUM SUCCINATE 40 MG/ML
40 INJECTION, POWDER, LYOPHILIZED, FOR SOLUTION INTRAMUSCULAR; INTRAVENOUS ONCE
Status: CANCELLED
Start: 2024-09-03 | End: 2024-08-24

## 2024-08-23 RX ADMIN — METHYLPREDNISOLONE SODIUM SUCCINATE 40 MG: 40 INJECTION, POWDER, FOR SOLUTION INTRAMUSCULAR; INTRAVENOUS at 13:30

## 2024-08-23 RX ADMIN — FAMOTIDINE 40 MG: 10 INJECTION, SOLUTION INTRAVENOUS at 13:00

## 2024-08-23 RX ADMIN — SODIUM CHLORIDE, SODIUM GLUCONATE, SODIUM ACETATE, POTASSIUM CHLORIDE, MAGNESIUM CHLORIDE, SODIUM PHOSPHATE, DIBASIC, AND POTASSIUM PHOSPHATE 1000 ML: .53; .5; .37; .037; .03; .012; .00082 INJECTION, SOLUTION INTRAVENOUS at 12:00

## 2024-08-23 RX ADMIN — DIPHENHYDRAMINE HYDROCHLORIDE 50 MG: 50 INJECTION, SOLUTION INTRAMUSCULAR; INTRAVENOUS at 12:27

## 2024-08-23 NOTE — PROGRESS NOTES
Celine Roa tolerated IV hydration well with no complications.      Celine Roa is aware of future appt on 09/03/24 at 1230.     AVS declined.

## 2024-08-27 ENCOUNTER — HOSPITAL ENCOUNTER (OUTPATIENT)
Dept: INFUSION CENTER | Facility: HOSPITAL | Age: 49
End: 2024-08-27

## 2024-09-03 ENCOUNTER — HOSPITAL ENCOUNTER (OUTPATIENT)
Dept: INFUSION CENTER | Facility: HOSPITAL | Age: 49
Discharge: HOME | End: 2024-09-03
Payer: COMMERCIAL

## 2024-09-03 VITALS
RESPIRATION RATE: 18 BRPM | DIASTOLIC BLOOD PRESSURE: 75 MMHG | SYSTOLIC BLOOD PRESSURE: 119 MMHG | HEART RATE: 94 BPM | TEMPERATURE: 97 F | OXYGEN SATURATION: 96 %

## 2024-09-03 DIAGNOSIS — L50.1 CHRONIC IDIOPATHIC URTICARIA: Primary | ICD-10-CM

## 2024-09-03 DIAGNOSIS — D50.0 IRON DEFICIENCY ANEMIA DUE TO CHRONIC BLOOD LOSS: Primary | ICD-10-CM

## 2024-09-03 DIAGNOSIS — R19.7 VOMITING AND DIARRHEA: ICD-10-CM

## 2024-09-03 DIAGNOSIS — E61.1 IRON DEFICIENCY: ICD-10-CM

## 2024-09-03 DIAGNOSIS — R63.0 ANOREXIA: ICD-10-CM

## 2024-09-03 DIAGNOSIS — R11.10 VOMITING AND DIARRHEA: ICD-10-CM

## 2024-09-03 DIAGNOSIS — L50.1 CHRONIC IDIOPATHIC URTICARIA: ICD-10-CM

## 2024-09-03 DIAGNOSIS — E44.1 MILD PROTEIN-CALORIE MALNUTRITION (HCC): ICD-10-CM

## 2024-09-03 PROCEDURE — 96368 THER/DIAG CONCURRENT INF: CPT

## 2024-09-03 PROCEDURE — 96365 THER/PROPH/DIAG IV INF INIT: CPT

## 2024-09-03 PROCEDURE — 96372 THER/PROPH/DIAG INJ SC/IM: CPT

## 2024-09-03 PROCEDURE — 96366 THER/PROPH/DIAG IV INF ADDON: CPT

## 2024-09-03 PROCEDURE — 96375 TX/PRO/DX INJ NEW DRUG ADDON: CPT

## 2024-09-03 RX ORDER — METHYLPREDNISOLONE SODIUM SUCCINATE 40 MG/ML
40 INJECTION, POWDER, LYOPHILIZED, FOR SOLUTION INTRAMUSCULAR; INTRAVENOUS ONCE
Status: CANCELLED
Start: 2024-09-04 | End: 2024-09-04

## 2024-09-03 RX ORDER — SODIUM CHLORIDE, SODIUM GLUCONATE, SODIUM ACETATE, POTASSIUM CHLORIDE, MAGNESIUM CHLORIDE, SODIUM PHOSPHATE, DIBASIC, AND POTASSIUM PHOSPHATE .53; .5; .37; .037; .03; .012; .00082 G/100ML; G/100ML; G/100ML; G/100ML; G/100ML; G/100ML; G/100ML
1000 INJECTION, SOLUTION INTRAVENOUS ONCE
Status: COMPLETED | OUTPATIENT
Start: 2024-09-03 | End: 2024-09-03

## 2024-09-03 RX ORDER — EPINEPHRINE 1 MG/ML
0.3 INJECTION, SOLUTION, CONCENTRATE INTRAVENOUS
Status: DISCONTINUED | OUTPATIENT
Start: 2024-09-03 | End: 2024-09-06 | Stop reason: HOSPADM

## 2024-09-03 RX ORDER — EPINEPHRINE 1 MG/ML
0.3 INJECTION, SOLUTION, CONCENTRATE INTRAVENOUS
Status: CANCELLED | OUTPATIENT
Start: 2024-10-01

## 2024-09-03 RX ORDER — SODIUM CHLORIDE, SODIUM GLUCONATE, SODIUM ACETATE, POTASSIUM CHLORIDE, MAGNESIUM CHLORIDE, SODIUM PHOSPHATE, DIBASIC, AND POTASSIUM PHOSPHATE .53; .5; .37; .037; .03; .012; .00082 G/100ML; G/100ML; G/100ML; G/100ML; G/100ML; G/100ML; G/100ML
1000 INJECTION, SOLUTION INTRAVENOUS ONCE
Status: CANCELLED
Start: 2024-09-04 | End: 2024-09-04

## 2024-09-03 RX ORDER — METHYLPREDNISOLONE SODIUM SUCCINATE 40 MG/ML
40 INJECTION, POWDER, LYOPHILIZED, FOR SOLUTION INTRAMUSCULAR; INTRAVENOUS ONCE
Status: COMPLETED | OUTPATIENT
Start: 2024-09-03 | End: 2024-09-03

## 2024-09-03 RX ADMIN — SODIUM CHLORIDE, SODIUM GLUCONATE, SODIUM ACETATE, POTASSIUM CHLORIDE, MAGNESIUM CHLORIDE, SODIUM PHOSPHATE, DIBASIC, AND POTASSIUM PHOSPHATE 1000 ML: .53; .5; .37; .037; .03; .012; .00082 INJECTION, SOLUTION INTRAVENOUS at 13:03

## 2024-09-03 RX ADMIN — FAMOTIDINE 40 MG: 10 INJECTION, SOLUTION INTRAVENOUS at 14:09

## 2024-09-03 RX ADMIN — DIPHENHYDRAMINE HYDROCHLORIDE 50 MG: 50 INJECTION, SOLUTION INTRAMUSCULAR; INTRAVENOUS at 13:34

## 2024-09-03 RX ADMIN — METHYLPREDNISOLONE SODIUM SUCCINATE 40 MG: 40 INJECTION, POWDER, FOR SOLUTION INTRAMUSCULAR; INTRAVENOUS at 14:39

## 2024-09-03 RX ADMIN — OMALIZUMAB 300 MG: 150 INJECTION, SOLUTION SUBCUTANEOUS at 15:11

## 2024-09-03 NOTE — PROGRESS NOTES
Patient's epipen at bedside with expiration 5/25. Celine Roa  tolerated IV hydration and subq Xolair well with no complications.      Celine Roa is aware of future appt on 09/06/24 at 0830 at [facility] infusion.    AVS declined.

## 2024-09-04 ENCOUNTER — TELEPHONE (OUTPATIENT)
Dept: FAMILY MEDICINE CLINIC | Facility: HOME HEALTHCARE | Age: 49
End: 2024-09-04

## 2024-09-04 DIAGNOSIS — L50.1 CHRONIC IDIOPATHIC URTICARIA: Primary | ICD-10-CM

## 2024-09-04 RX ORDER — DIPHENHYDRAMINE HYDROCHLORIDE 50 MG/ML
50 INJECTION INTRAMUSCULAR; INTRAVENOUS EVERY 12 HOURS PRN
Qty: 10 ML | Refills: 3 | Status: SHIPPED | OUTPATIENT
Start: 2024-09-04

## 2024-09-04 RX ORDER — EPINEPHRINE 0.3 MG/.3ML
0.3 INJECTION SUBCUTANEOUS DAILY PRN
Qty: 0.6 ML | Refills: 3 | Status: SHIPPED | OUTPATIENT
Start: 2024-09-04

## 2024-09-04 NOTE — TELEPHONE ENCOUNTER
Received following voicemail from patient: Hi this is Celine Roa calling. Birth date is [birth year]. I sent in a message early this morning. I am out of epipens and I am Benadryl. I've been having really bad issues with corn season being here since I came home from the hospital and I don't have any epipens or I left and urgently need that called into my pharmacy again. It's for Celine Roa birthday [birth year] And can you please return my phone call so that I know that you got my message and that the prescriptions have been called in? Thank you.    Called back patient explaining message was forwarded this morning - patient sent message Via [employer] and message was sent to provider. Sent Epic Secure chat to provider to address message. Waiting for provider response, patient made aware. Will call back patient as soon as it is filled.

## 2024-09-06 ENCOUNTER — HOSPITAL ENCOUNTER (OUTPATIENT)
Dept: INFUSION CENTER | Facility: HOSPITAL | Age: 49
End: 2024-09-06
Attending: INTERNAL MEDICINE
Payer: COMMERCIAL

## 2024-09-06 VITALS
HEART RATE: 111 BPM | DIASTOLIC BLOOD PRESSURE: 71 MMHG | TEMPERATURE: 98.6 F | SYSTOLIC BLOOD PRESSURE: 101 MMHG | RESPIRATION RATE: 16 BRPM | OXYGEN SATURATION: 97 %

## 2024-09-06 DIAGNOSIS — E61.1 IRON DEFICIENCY: ICD-10-CM

## 2024-09-06 DIAGNOSIS — R19.7 VOMITING AND DIARRHEA: ICD-10-CM

## 2024-09-06 DIAGNOSIS — D50.0 IRON DEFICIENCY ANEMIA DUE TO CHRONIC BLOOD LOSS: Primary | ICD-10-CM

## 2024-09-06 DIAGNOSIS — E44.1 MILD PROTEIN-CALORIE MALNUTRITION (HCC): ICD-10-CM

## 2024-09-06 DIAGNOSIS — R11.10 VOMITING AND DIARRHEA: ICD-10-CM

## 2024-09-06 DIAGNOSIS — R63.0 ANOREXIA: ICD-10-CM

## 2024-09-06 PROCEDURE — 96375 TX/PRO/DX INJ NEW DRUG ADDON: CPT

## 2024-09-06 PROCEDURE — 96366 THER/PROPH/DIAG IV INF ADDON: CPT

## 2024-09-06 PROCEDURE — 96365 THER/PROPH/DIAG IV INF INIT: CPT

## 2024-09-06 PROCEDURE — 96367 TX/PROPH/DG ADDL SEQ IV INF: CPT

## 2024-09-06 RX ORDER — SODIUM CHLORIDE, SODIUM GLUCONATE, SODIUM ACETATE, POTASSIUM CHLORIDE, MAGNESIUM CHLORIDE, SODIUM PHOSPHATE, DIBASIC, AND POTASSIUM PHOSPHATE .53; .5; .37; .037; .03; .012; .00082 G/100ML; G/100ML; G/100ML; G/100ML; G/100ML; G/100ML; G/100ML
1000 INJECTION, SOLUTION INTRAVENOUS ONCE
Status: COMPLETED | OUTPATIENT
Start: 2024-09-06 | End: 2024-09-06

## 2024-09-06 RX ORDER — EPINEPHRINE 1 MG/ML
0.3 INJECTION, SOLUTION, CONCENTRATE INTRAVENOUS
OUTPATIENT
Start: 2024-10-01

## 2024-09-06 RX ORDER — METHYLPREDNISOLONE SODIUM SUCCINATE 40 MG/ML
40 INJECTION, POWDER, LYOPHILIZED, FOR SOLUTION INTRAMUSCULAR; INTRAVENOUS ONCE
Status: CANCELLED
Start: 2024-09-07 | End: 2024-09-07

## 2024-09-06 RX ORDER — SODIUM CHLORIDE, SODIUM GLUCONATE, SODIUM ACETATE, POTASSIUM CHLORIDE, MAGNESIUM CHLORIDE, SODIUM PHOSPHATE, DIBASIC, AND POTASSIUM PHOSPHATE .53; .5; .37; .037; .03; .012; .00082 G/100ML; G/100ML; G/100ML; G/100ML; G/100ML; G/100ML; G/100ML
1000 INJECTION, SOLUTION INTRAVENOUS ONCE
Status: CANCELLED
Start: 2024-09-07 | End: 2024-09-07

## 2024-09-06 RX ORDER — METHYLPREDNISOLONE SODIUM SUCCINATE 40 MG/ML
40 INJECTION, POWDER, LYOPHILIZED, FOR SOLUTION INTRAMUSCULAR; INTRAVENOUS ONCE
Status: COMPLETED | OUTPATIENT
Start: 2024-09-06 | End: 2024-09-06

## 2024-09-06 RX ADMIN — Medication 50 MG: at 09:01

## 2024-09-06 RX ADMIN — Medication 40 MG: at 09:28

## 2024-09-06 RX ADMIN — SODIUM CHLORIDE, SODIUM GLUCONATE, SODIUM ACETATE, POTASSIUM CHLORIDE, MAGNESIUM CHLORIDE, SODIUM PHOSPHATE, DIBASIC, AND POTASSIUM PHOSPHATE 1000 ML: .53; .5; .37; .037; .03; .012; .00082 INJECTION, SOLUTION INTRAVENOUS at 09:52

## 2024-09-06 RX ADMIN — METHYLPREDNISOLONE SODIUM SUCCINATE 40 MG: 40 INJECTION, POWDER, FOR SOLUTION INTRAMUSCULAR; INTRAVENOUS at 09:49

## 2024-09-06 NOTE — PROGRESS NOTES
Celine Roa  tolerated premeds and hydration  treatment well with no complications.      [facility]  No (Declined by Celine Roa)

## 2024-09-06 NOTE — PLAN OF CARE
Problem: Knowledge Deficit  Goal: Patient/family/caregiver demonstrates understanding of disease process, treatment plan, medications, and discharge instructions  Description: Complete learning assessment and assess knowledge base.  Interventions:  - Provide teaching at level of understanding  - Provide teaching via preferred learning methods  9/6/2024 0842 by Amanda Michel RN  Outcome: Progressing  9/6/2024 0837 by Amanda Michel RN  Outcome: Progressing     Problem: Knowledge Deficit  Goal: Patient/family/caregiver demonstrates understanding of disease process, treatment plan, medications, and discharge instructions  Description: Complete learning assessment and assess knowledge base.  Interventions:  - Provide teaching at level of understanding  - Provide teaching via preferred learning methods  9/6/2024 0842 by Amanda Michel RN  Outcome: Progressing  9/6/2024 0837 by Amanda Michel RN  Outcome: Progressing

## 2024-09-09 ENCOUNTER — HOSPITAL ENCOUNTER (OUTPATIENT)
Dept: INFUSION CENTER | Facility: HOSPITAL | Age: 49
Discharge: HOME | End: 2024-09-09
Attending: INTERNAL MEDICINE
Payer: COMMERCIAL

## 2024-09-09 VITALS
RESPIRATION RATE: 16 BRPM | HEART RATE: 105 BPM | OXYGEN SATURATION: 97 % | TEMPERATURE: 98.5 F | SYSTOLIC BLOOD PRESSURE: 113 MMHG | DIASTOLIC BLOOD PRESSURE: 80 MMHG

## 2024-09-09 DIAGNOSIS — R19.7 VOMITING AND DIARRHEA: ICD-10-CM

## 2024-09-09 DIAGNOSIS — D50.0 IRON DEFICIENCY ANEMIA DUE TO CHRONIC BLOOD LOSS: Primary | ICD-10-CM

## 2024-09-09 DIAGNOSIS — E61.1 IRON DEFICIENCY: ICD-10-CM

## 2024-09-09 DIAGNOSIS — E44.1 MILD PROTEIN-CALORIE MALNUTRITION (HCC): ICD-10-CM

## 2024-09-09 DIAGNOSIS — R63.0 ANOREXIA: ICD-10-CM

## 2024-09-09 DIAGNOSIS — R11.10 VOMITING AND DIARRHEA: ICD-10-CM

## 2024-09-09 PROCEDURE — 96366 THER/PROPH/DIAG IV INF ADDON: CPT

## 2024-09-09 PROCEDURE — 96375 TX/PRO/DX INJ NEW DRUG ADDON: CPT

## 2024-09-09 PROCEDURE — 96365 THER/PROPH/DIAG IV INF INIT: CPT

## 2024-09-09 PROCEDURE — 96367 TX/PROPH/DG ADDL SEQ IV INF: CPT

## 2024-09-09 RX ORDER — METHYLPREDNISOLONE SODIUM SUCCINATE 40 MG/ML
40 INJECTION, POWDER, LYOPHILIZED, FOR SOLUTION INTRAMUSCULAR; INTRAVENOUS ONCE
Status: CANCELLED
Start: 2024-09-10 | End: 2024-09-10

## 2024-09-09 RX ORDER — SODIUM CHLORIDE, SODIUM GLUCONATE, SODIUM ACETATE, POTASSIUM CHLORIDE, MAGNESIUM CHLORIDE, SODIUM PHOSPHATE, DIBASIC, AND POTASSIUM PHOSPHATE .53; .5; .37; .037; .03; .012; .00082 G/100ML; G/100ML; G/100ML; G/100ML; G/100ML; G/100ML; G/100ML
1000 INJECTION, SOLUTION INTRAVENOUS ONCE
Status: CANCELLED
Start: 2024-09-10 | End: 2024-09-10

## 2024-09-09 RX ORDER — METHYLPREDNISOLONE SODIUM SUCCINATE 40 MG/ML
40 INJECTION, POWDER, LYOPHILIZED, FOR SOLUTION INTRAMUSCULAR; INTRAVENOUS ONCE
Status: COMPLETED | OUTPATIENT
Start: 2024-09-09 | End: 2024-09-09

## 2024-09-09 RX ORDER — SODIUM CHLORIDE, SODIUM GLUCONATE, SODIUM ACETATE, POTASSIUM CHLORIDE, MAGNESIUM CHLORIDE, SODIUM PHOSPHATE, DIBASIC, AND POTASSIUM PHOSPHATE .53; .5; .37; .037; .03; .012; .00082 G/100ML; G/100ML; G/100ML; G/100ML; G/100ML; G/100ML; G/100ML
1000 INJECTION, SOLUTION INTRAVENOUS ONCE
Status: COMPLETED | OUTPATIENT
Start: 2024-09-09 | End: 2024-09-09

## 2024-09-09 RX ADMIN — METHYLPREDNISOLONE SODIUM SUCCINATE 40 MG: 40 INJECTION, POWDER, FOR SOLUTION INTRAMUSCULAR; INTRAVENOUS at 10:03

## 2024-09-09 RX ADMIN — FAMOTIDINE 40 MG: 10 INJECTION INTRAVENOUS at 09:44

## 2024-09-09 RX ADMIN — DIPHENHYDRAMINE HYDROCHLORIDE 50 MG: 50 INJECTION, SOLUTION INTRAMUSCULAR; INTRAVENOUS at 09:22

## 2024-09-09 RX ADMIN — SODIUM CHLORIDE, SODIUM GLUCONATE, SODIUM ACETATE, POTASSIUM CHLORIDE, MAGNESIUM CHLORIDE, SODIUM PHOSPHATE, DIBASIC, AND POTASSIUM PHOSPHATE 1000 ML: .53; .5; .37; .037; .03; .012; .00082 INJECTION, SOLUTION INTRAVENOUS at 10:05

## 2024-09-09 NOTE — PROGRESS NOTES
Celine Roa  tolerated hydration with pre meds well with no complications.      Celine Roa is aware of future appt on tomorrow at 11    AVS printed and given to Celine Roa:   No (Declined by Celine Roa)

## 2024-09-10 ENCOUNTER — HOSPITAL ENCOUNTER (OUTPATIENT)
Dept: INFUSION CENTER | Facility: HOSPITAL | Age: 49
Discharge: HOME | End: 2024-09-10
Attending: INTERNAL MEDICINE
Payer: COMMERCIAL

## 2024-09-10 VITALS
OXYGEN SATURATION: 100 % | TEMPERATURE: 96.9 F | SYSTOLIC BLOOD PRESSURE: 112 MMHG | HEART RATE: 99 BPM | RESPIRATION RATE: 16 BRPM | DIASTOLIC BLOOD PRESSURE: 71 MMHG

## 2024-09-10 DIAGNOSIS — D50.0 IRON DEFICIENCY ANEMIA DUE TO CHRONIC BLOOD LOSS: Primary | ICD-10-CM

## 2024-09-10 DIAGNOSIS — E44.1 MILD PROTEIN-CALORIE MALNUTRITION (HCC): ICD-10-CM

## 2024-09-10 DIAGNOSIS — R19.7 VOMITING AND DIARRHEA: ICD-10-CM

## 2024-09-10 DIAGNOSIS — R11.10 VOMITING AND DIARRHEA: ICD-10-CM

## 2024-09-10 DIAGNOSIS — E61.1 IRON DEFICIENCY: ICD-10-CM

## 2024-09-10 DIAGNOSIS — R63.0 ANOREXIA: ICD-10-CM

## 2024-09-10 PROCEDURE — 96366 THER/PROPH/DIAG IV INF ADDON: CPT

## 2024-09-10 PROCEDURE — 96365 THER/PROPH/DIAG IV INF INIT: CPT

## 2024-09-10 PROCEDURE — 96375 TX/PRO/DX INJ NEW DRUG ADDON: CPT

## 2024-09-10 PROCEDURE — 96367 TX/PROPH/DG ADDL SEQ IV INF: CPT

## 2024-09-10 RX ORDER — SODIUM CHLORIDE, SODIUM GLUCONATE, SODIUM ACETATE, POTASSIUM CHLORIDE, MAGNESIUM CHLORIDE, SODIUM PHOSPHATE, DIBASIC, AND POTASSIUM PHOSPHATE .53; .5; .37; .037; .03; .012; .00082 G/100ML; G/100ML; G/100ML; G/100ML; G/100ML; G/100ML; G/100ML
1000 INJECTION, SOLUTION INTRAVENOUS ONCE
Status: COMPLETED | OUTPATIENT
Start: 2024-09-10 | End: 2024-09-10

## 2024-09-10 RX ORDER — METHYLPREDNISOLONE SODIUM SUCCINATE 40 MG/ML
40 INJECTION, POWDER, LYOPHILIZED, FOR SOLUTION INTRAMUSCULAR; INTRAVENOUS ONCE
Status: COMPLETED | OUTPATIENT
Start: 2024-09-10 | End: 2024-09-10

## 2024-09-10 RX ORDER — METHYLPREDNISOLONE SODIUM SUCCINATE 40 MG/ML
40 INJECTION, POWDER, LYOPHILIZED, FOR SOLUTION INTRAMUSCULAR; INTRAVENOUS ONCE
Status: CANCELLED
Start: 2024-09-11 | End: 2024-09-11

## 2024-09-10 RX ORDER — SODIUM CHLORIDE, SODIUM GLUCONATE, SODIUM ACETATE, POTASSIUM CHLORIDE, MAGNESIUM CHLORIDE, SODIUM PHOSPHATE, DIBASIC, AND POTASSIUM PHOSPHATE .53; .5; .37; .037; .03; .012; .00082 G/100ML; G/100ML; G/100ML; G/100ML; G/100ML; G/100ML; G/100ML
1000 INJECTION, SOLUTION INTRAVENOUS ONCE
Status: CANCELLED
Start: 2024-09-11 | End: 2024-09-11

## 2024-09-10 RX ADMIN — DIPHENHYDRAMINE HYDROCHLORIDE 50 MG: 50 INJECTION, SOLUTION INTRAMUSCULAR; INTRAVENOUS at 12:01

## 2024-09-10 RX ADMIN — SODIUM CHLORIDE, SODIUM GLUCONATE, SODIUM ACETATE, POTASSIUM CHLORIDE, MAGNESIUM CHLORIDE, SODIUM PHOSPHATE, DIBASIC, AND POTASSIUM PHOSPHATE 1000 ML: .53; .5; .37; .037; .03; .012; .00082 INJECTION, SOLUTION INTRAVENOUS at 12:53

## 2024-09-10 RX ADMIN — METHYLPREDNISOLONE SODIUM SUCCINATE 40 MG: 40 INJECTION, POWDER, FOR SOLUTION INTRAMUSCULAR; INTRAVENOUS at 12:52

## 2024-09-10 RX ADMIN — FAMOTIDINE 40 MG: 10 INJECTION INTRAVENOUS at 12:23

## 2024-09-10 NOTE — PROGRESS NOTES
Celine Roa  tolerated hydration and premeds  treatment well with no complications.      Celine Roa is aware of future appt on tomorrow    AVS printed and given to Celine Roa:   No (Declined by Celine Roa)

## 2024-09-11 ENCOUNTER — HOSPITAL ENCOUNTER (OUTPATIENT)
Dept: INFUSION CENTER | Facility: HOSPITAL | Age: 49
Discharge: HOME | End: 2024-09-11
Attending: INTERNAL MEDICINE
Payer: COMMERCIAL

## 2024-09-11 VITALS
DIASTOLIC BLOOD PRESSURE: 78 MMHG | RESPIRATION RATE: 16 BRPM | TEMPERATURE: 97.6 F | SYSTOLIC BLOOD PRESSURE: 118 MMHG | HEART RATE: 87 BPM | OXYGEN SATURATION: 98 %

## 2024-09-11 DIAGNOSIS — N83.202 BILATERAL OVARIAN CYSTS: Primary | ICD-10-CM

## 2024-09-11 DIAGNOSIS — E44.1 MILD PROTEIN-CALORIE MALNUTRITION (HCC): ICD-10-CM

## 2024-09-11 DIAGNOSIS — E61.1 IRON DEFICIENCY: ICD-10-CM

## 2024-09-11 DIAGNOSIS — R63.0 ANOREXIA: ICD-10-CM

## 2024-09-11 DIAGNOSIS — D50.0 IRON DEFICIENCY ANEMIA DUE TO CHRONIC BLOOD LOSS: ICD-10-CM

## 2024-09-11 DIAGNOSIS — R11.10 VOMITING AND DIARRHEA: Primary | ICD-10-CM

## 2024-09-11 DIAGNOSIS — R19.7 VOMITING AND DIARRHEA: Primary | ICD-10-CM

## 2024-09-11 DIAGNOSIS — N83.201 BILATERAL OVARIAN CYSTS: Primary | ICD-10-CM

## 2024-09-11 PROCEDURE — 96367 TX/PROPH/DG ADDL SEQ IV INF: CPT

## 2024-09-11 PROCEDURE — 96365 THER/PROPH/DIAG IV INF INIT: CPT

## 2024-09-11 PROCEDURE — 96366 THER/PROPH/DIAG IV INF ADDON: CPT

## 2024-09-11 PROCEDURE — 96375 TX/PRO/DX INJ NEW DRUG ADDON: CPT

## 2024-09-11 RX ORDER — SODIUM CHLORIDE, SODIUM GLUCONATE, SODIUM ACETATE, POTASSIUM CHLORIDE, MAGNESIUM CHLORIDE, SODIUM PHOSPHATE, DIBASIC, AND POTASSIUM PHOSPHATE .53; .5; .37; .037; .03; .012; .00082 G/100ML; G/100ML; G/100ML; G/100ML; G/100ML; G/100ML; G/100ML
1000 INJECTION, SOLUTION INTRAVENOUS ONCE
Status: COMPLETED | OUTPATIENT
Start: 2024-09-11 | End: 2024-09-11

## 2024-09-11 RX ORDER — SODIUM CHLORIDE, SODIUM GLUCONATE, SODIUM ACETATE, POTASSIUM CHLORIDE, MAGNESIUM CHLORIDE, SODIUM PHOSPHATE, DIBASIC, AND POTASSIUM PHOSPHATE .53; .5; .37; .037; .03; .012; .00082 G/100ML; G/100ML; G/100ML; G/100ML; G/100ML; G/100ML; G/100ML
1000 INJECTION, SOLUTION INTRAVENOUS ONCE
Status: CANCELLED
Start: 2024-09-12 | End: 2024-09-12

## 2024-09-11 RX ORDER — METHYLPREDNISOLONE SODIUM SUCCINATE 40 MG/ML
40 INJECTION, POWDER, LYOPHILIZED, FOR SOLUTION INTRAMUSCULAR; INTRAVENOUS ONCE
Status: COMPLETED | OUTPATIENT
Start: 2024-09-11 | End: 2024-09-11

## 2024-09-11 RX ORDER — METHYLPREDNISOLONE SODIUM SUCCINATE 40 MG/ML
40 INJECTION, POWDER, LYOPHILIZED, FOR SOLUTION INTRAMUSCULAR; INTRAVENOUS ONCE
Status: CANCELLED
Start: 2024-09-12 | End: 2024-09-12

## 2024-09-11 RX ADMIN — METHYLPREDNISOLONE SODIUM SUCCINATE 40 MG: 40 INJECTION, POWDER, FOR SOLUTION INTRAMUSCULAR; INTRAVENOUS at 13:01

## 2024-09-11 RX ADMIN — SODIUM CHLORIDE, SODIUM GLUCONATE, SODIUM ACETATE, POTASSIUM CHLORIDE, MAGNESIUM CHLORIDE, SODIUM PHOSPHATE, DIBASIC, AND POTASSIUM PHOSPHATE 1000 ML: .53; .5; .37; .037; .03; .012; .00082 INJECTION, SOLUTION INTRAVENOUS at 13:05

## 2024-09-11 RX ADMIN — FAMOTIDINE 40 MG: 10 INJECTION INTRAVENOUS at 12:44

## 2024-09-11 RX ADMIN — DIPHENHYDRAMINE HYDROCHLORIDE 50 MG: 50 INJECTION, SOLUTION INTRAMUSCULAR; INTRAVENOUS at 12:21

## 2024-09-11 NOTE — PROGRESS NOTES
Celine Roa  tolerated hydration with premeds well with no complications.      Celine Roa is aware of future appt tomorrow here at     AVS printed and given to Celine Roa:  No (Declined by Celine Roa)

## 2024-09-12 ENCOUNTER — HOSPITAL ENCOUNTER (OUTPATIENT)
Dept: INFUSION CENTER | Facility: HOSPITAL | Age: 49
End: 2024-09-12
Attending: INTERNAL MEDICINE
Payer: COMMERCIAL

## 2024-09-12 ENCOUNTER — TELEPHONE (OUTPATIENT)
Dept: FAMILY MEDICINE CLINIC | Facility: HOME HEALTHCARE | Age: 49
End: 2024-09-12

## 2024-09-12 VITALS
DIASTOLIC BLOOD PRESSURE: 76 MMHG | OXYGEN SATURATION: 99 % | TEMPERATURE: 97.6 F | RESPIRATION RATE: 16 BRPM | SYSTOLIC BLOOD PRESSURE: 112 MMHG | HEART RATE: 99 BPM

## 2024-09-12 DIAGNOSIS — E44.1 MILD PROTEIN-CALORIE MALNUTRITION (HCC): ICD-10-CM

## 2024-09-12 DIAGNOSIS — E61.1 IRON DEFICIENCY: ICD-10-CM

## 2024-09-12 DIAGNOSIS — D50.0 IRON DEFICIENCY ANEMIA DUE TO CHRONIC BLOOD LOSS: ICD-10-CM

## 2024-09-12 DIAGNOSIS — R63.0 ANOREXIA: ICD-10-CM

## 2024-09-12 DIAGNOSIS — R11.10 VOMITING AND DIARRHEA: Primary | ICD-10-CM

## 2024-09-12 DIAGNOSIS — R19.7 VOMITING AND DIARRHEA: Primary | ICD-10-CM

## 2024-09-12 PROCEDURE — 96366 THER/PROPH/DIAG IV INF ADDON: CPT

## 2024-09-12 PROCEDURE — 96375 TX/PRO/DX INJ NEW DRUG ADDON: CPT

## 2024-09-12 PROCEDURE — 96365 THER/PROPH/DIAG IV INF INIT: CPT

## 2024-09-12 PROCEDURE — 96367 TX/PROPH/DG ADDL SEQ IV INF: CPT

## 2024-09-12 RX ORDER — METHYLPREDNISOLONE SODIUM SUCCINATE 40 MG/ML
40 INJECTION, POWDER, LYOPHILIZED, FOR SOLUTION INTRAMUSCULAR; INTRAVENOUS ONCE
Status: CANCELLED
Start: 2024-09-16 | End: 2024-09-13

## 2024-09-12 RX ORDER — METHYLPREDNISOLONE SODIUM SUCCINATE 40 MG/ML
40 INJECTION, POWDER, LYOPHILIZED, FOR SOLUTION INTRAMUSCULAR; INTRAVENOUS ONCE
Status: COMPLETED | OUTPATIENT
Start: 2024-09-12 | End: 2024-09-12

## 2024-09-12 RX ORDER — SODIUM CHLORIDE, SODIUM GLUCONATE, SODIUM ACETATE, POTASSIUM CHLORIDE, MAGNESIUM CHLORIDE, SODIUM PHOSPHATE, DIBASIC, AND POTASSIUM PHOSPHATE .53; .5; .37; .037; .03; .012; .00082 G/100ML; G/100ML; G/100ML; G/100ML; G/100ML; G/100ML; G/100ML
1000 INJECTION, SOLUTION INTRAVENOUS ONCE
Status: CANCELLED
Start: 2024-09-16 | End: 2024-09-13

## 2024-09-12 RX ORDER — SODIUM CHLORIDE, SODIUM GLUCONATE, SODIUM ACETATE, POTASSIUM CHLORIDE, MAGNESIUM CHLORIDE, SODIUM PHOSPHATE, DIBASIC, AND POTASSIUM PHOSPHATE .53; .5; .37; .037; .03; .012; .00082 G/100ML; G/100ML; G/100ML; G/100ML; G/100ML; G/100ML; G/100ML
1000 INJECTION, SOLUTION INTRAVENOUS ONCE
Status: COMPLETED | OUTPATIENT
Start: 2024-09-12 | End: 2024-09-12

## 2024-09-12 RX ADMIN — METHYLPREDNISOLONE SODIUM SUCCINATE 40 MG: 40 INJECTION, POWDER, FOR SOLUTION INTRAMUSCULAR; INTRAVENOUS at 09:23

## 2024-09-12 RX ADMIN — SODIUM CHLORIDE, SODIUM GLUCONATE, SODIUM ACETATE, POTASSIUM CHLORIDE, MAGNESIUM CHLORIDE, SODIUM PHOSPHATE, DIBASIC, AND POTASSIUM PHOSPHATE 1000 ML: .53; .5; .37; .037; .03; .012; .00082 INJECTION, SOLUTION INTRAVENOUS at 10:21

## 2024-09-12 RX ADMIN — DIPHENHYDRAMINE HYDROCHLORIDE 50 MG: 50 INJECTION, SOLUTION INTRAMUSCULAR; INTRAVENOUS at 09:25

## 2024-09-12 RX ADMIN — FAMOTIDINE 40 MG: 10 INJECTION INTRAVENOUS at 09:45

## 2024-09-12 NOTE — TELEPHONE ENCOUNTER
Pt can only have the brand name for the epi-pen. Brand Name is Mylan. Pt states that she an allergy of corn.    Reason for call:   [x] Prior Auth  [] Other:     Caller:  [x] Patient  [] Pharmacy  Name:   Address:   Callback Number:     Medication: EPIPEN) BRAND NAME ONLY-MYLAN    Dose/Frequency:     Quantity: .3 mg/0.3 mL SOAJ     Ordering Provider:   [x] PCP/Provider - Olaf Barrios   [] Speciality/Provider -

## 2024-09-12 NOTE — PROGRESS NOTES
Celine Roa  tolerated hydration and pre-med treatment well with no complications.      Celine Roa is aware of future appt on 9/16 at 10:30AM.     AVS printed and given to Celine Roa: No (Declined by Celine Roa).

## 2024-09-16 ENCOUNTER — HOSPITAL ENCOUNTER (OUTPATIENT)
Dept: INFUSION CENTER | Facility: HOSPITAL | Age: 49
Discharge: HOME | End: 2024-09-16
Attending: INTERNAL MEDICINE
Payer: COMMERCIAL

## 2024-09-16 VITALS
OXYGEN SATURATION: 98 % | HEART RATE: 105 BPM | DIASTOLIC BLOOD PRESSURE: 86 MMHG | SYSTOLIC BLOOD PRESSURE: 117 MMHG | RESPIRATION RATE: 16 BRPM | TEMPERATURE: 97.8 F

## 2024-09-16 DIAGNOSIS — E44.1 MILD PROTEIN-CALORIE MALNUTRITION (HCC): ICD-10-CM

## 2024-09-16 DIAGNOSIS — R63.0 ANOREXIA: ICD-10-CM

## 2024-09-16 DIAGNOSIS — D50.0 IRON DEFICIENCY ANEMIA DUE TO CHRONIC BLOOD LOSS: Primary | ICD-10-CM

## 2024-09-16 DIAGNOSIS — R19.7 VOMITING AND DIARRHEA: ICD-10-CM

## 2024-09-16 DIAGNOSIS — E61.1 IRON DEFICIENCY: ICD-10-CM

## 2024-09-16 DIAGNOSIS — R11.10 VOMITING AND DIARRHEA: ICD-10-CM

## 2024-09-16 PROCEDURE — 96365 THER/PROPH/DIAG IV INF INIT: CPT

## 2024-09-16 PROCEDURE — 96366 THER/PROPH/DIAG IV INF ADDON: CPT

## 2024-09-16 PROCEDURE — 96375 TX/PRO/DX INJ NEW DRUG ADDON: CPT

## 2024-09-16 PROCEDURE — 96367 TX/PROPH/DG ADDL SEQ IV INF: CPT

## 2024-09-16 RX ORDER — METHYLPREDNISOLONE SODIUM SUCCINATE 40 MG/ML
40 INJECTION, POWDER, LYOPHILIZED, FOR SOLUTION INTRAMUSCULAR; INTRAVENOUS ONCE
Status: COMPLETED | OUTPATIENT
Start: 2024-09-16 | End: 2024-09-16

## 2024-09-16 RX ORDER — SODIUM CHLORIDE, SODIUM GLUCONATE, SODIUM ACETATE, POTASSIUM CHLORIDE, MAGNESIUM CHLORIDE, SODIUM PHOSPHATE, DIBASIC, AND POTASSIUM PHOSPHATE .53; .5; .37; .037; .03; .012; .00082 G/100ML; G/100ML; G/100ML; G/100ML; G/100ML; G/100ML; G/100ML
1000 INJECTION, SOLUTION INTRAVENOUS ONCE
Status: CANCELLED
Start: 2024-09-18 | End: 2024-09-17

## 2024-09-16 RX ORDER — METHYLPREDNISOLONE SODIUM SUCCINATE 40 MG/ML
40 INJECTION, POWDER, LYOPHILIZED, FOR SOLUTION INTRAMUSCULAR; INTRAVENOUS ONCE
Status: CANCELLED
Start: 2024-09-18 | End: 2024-09-17

## 2024-09-16 RX ORDER — SODIUM CHLORIDE, SODIUM GLUCONATE, SODIUM ACETATE, POTASSIUM CHLORIDE, MAGNESIUM CHLORIDE, SODIUM PHOSPHATE, DIBASIC, AND POTASSIUM PHOSPHATE .53; .5; .37; .037; .03; .012; .00082 G/100ML; G/100ML; G/100ML; G/100ML; G/100ML; G/100ML; G/100ML
1000 INJECTION, SOLUTION INTRAVENOUS ONCE
Status: COMPLETED | OUTPATIENT
Start: 2024-09-16 | End: 2024-09-16

## 2024-09-16 RX ADMIN — METHYLPREDNISOLONE SODIUM SUCCINATE 40 MG: 40 INJECTION, POWDER, FOR SOLUTION INTRAMUSCULAR; INTRAVENOUS at 12:02

## 2024-09-16 RX ADMIN — SODIUM CHLORIDE, SODIUM GLUCONATE, SODIUM ACETATE, POTASSIUM CHLORIDE, MAGNESIUM CHLORIDE, SODIUM PHOSPHATE, DIBASIC, AND POTASSIUM PHOSPHATE 1000 ML: .53; .5; .37; .037; .03; .012; .00082 INJECTION, SOLUTION INTRAVENOUS at 12:06

## 2024-09-16 RX ADMIN — FAMOTIDINE 40 MG: 10 INJECTION, SOLUTION INTRAVENOUS at 11:29

## 2024-09-16 RX ADMIN — DIPHENHYDRAMINE HYDROCHLORIDE 50 MG: 50 INJECTION, SOLUTION INTRAMUSCULAR; INTRAVENOUS at 11:01

## 2024-09-16 NOTE — PROGRESS NOTES
Pt tolerated Isolyte hydration & pre-meds well without complications. PIV removed without incident.      Celine Roa is aware of future appt on 9/18/24 at 10AM.      AVS declined by Celine Roa.     Pt discharged off unit in stable condition with all personal belongings.

## 2024-09-18 ENCOUNTER — HOSPITAL ENCOUNTER (OUTPATIENT)
Dept: INFUSION CENTER | Facility: HOSPITAL | Age: 49
Discharge: HOME | End: 2024-09-18
Attending: INTERNAL MEDICINE
Payer: COMMERCIAL

## 2024-09-18 VITALS
TEMPERATURE: 97.6 F | DIASTOLIC BLOOD PRESSURE: 82 MMHG | OXYGEN SATURATION: 99 % | HEART RATE: 102 BPM | SYSTOLIC BLOOD PRESSURE: 118 MMHG | RESPIRATION RATE: 16 BRPM

## 2024-09-18 DIAGNOSIS — E44.1 MILD PROTEIN-CALORIE MALNUTRITION (HCC): ICD-10-CM

## 2024-09-18 DIAGNOSIS — R63.0 ANOREXIA: ICD-10-CM

## 2024-09-18 DIAGNOSIS — E61.1 IRON DEFICIENCY: ICD-10-CM

## 2024-09-18 DIAGNOSIS — D50.0 IRON DEFICIENCY ANEMIA DUE TO CHRONIC BLOOD LOSS: Primary | ICD-10-CM

## 2024-09-18 DIAGNOSIS — R11.10 VOMITING AND DIARRHEA: ICD-10-CM

## 2024-09-18 DIAGNOSIS — R19.7 VOMITING AND DIARRHEA: ICD-10-CM

## 2024-09-18 PROCEDURE — 96367 TX/PROPH/DG ADDL SEQ IV INF: CPT

## 2024-09-18 PROCEDURE — 96365 THER/PROPH/DIAG IV INF INIT: CPT

## 2024-09-18 PROCEDURE — 96375 TX/PRO/DX INJ NEW DRUG ADDON: CPT

## 2024-09-18 RX ORDER — SODIUM CHLORIDE, SODIUM GLUCONATE, SODIUM ACETATE, POTASSIUM CHLORIDE, MAGNESIUM CHLORIDE, SODIUM PHOSPHATE, DIBASIC, AND POTASSIUM PHOSPHATE .53; .5; .37; .037; .03; .012; .00082 G/100ML; G/100ML; G/100ML; G/100ML; G/100ML; G/100ML; G/100ML
1000 INJECTION, SOLUTION INTRAVENOUS ONCE
Status: COMPLETED | OUTPATIENT
Start: 2024-09-18 | End: 2024-09-18

## 2024-09-18 RX ORDER — SODIUM CHLORIDE, SODIUM GLUCONATE, SODIUM ACETATE, POTASSIUM CHLORIDE, MAGNESIUM CHLORIDE, SODIUM PHOSPHATE, DIBASIC, AND POTASSIUM PHOSPHATE .53; .5; .37; .037; .03; .012; .00082 G/100ML; G/100ML; G/100ML; G/100ML; G/100ML; G/100ML; G/100ML
1000 INJECTION, SOLUTION INTRAVENOUS ONCE
Status: CANCELLED
Start: 2024-09-24 | End: 2024-09-19

## 2024-09-18 RX ORDER — METHYLPREDNISOLONE SODIUM SUCCINATE 40 MG/ML
40 INJECTION, POWDER, LYOPHILIZED, FOR SOLUTION INTRAMUSCULAR; INTRAVENOUS ONCE
Status: COMPLETED | OUTPATIENT
Start: 2024-09-18 | End: 2024-09-18

## 2024-09-18 RX ORDER — METHYLPREDNISOLONE SODIUM SUCCINATE 40 MG/ML
40 INJECTION, POWDER, LYOPHILIZED, FOR SOLUTION INTRAMUSCULAR; INTRAVENOUS ONCE
Status: CANCELLED
Start: 2024-09-24 | End: 2024-09-19

## 2024-09-18 RX ADMIN — DIPHENHYDRAMINE HYDROCHLORIDE 50 MG: 50 INJECTION, SOLUTION INTRAMUSCULAR; INTRAVENOUS at 10:20

## 2024-09-18 RX ADMIN — FAMOTIDINE 40 MG: 10 INJECTION, SOLUTION INTRAVENOUS at 11:00

## 2024-09-18 RX ADMIN — SODIUM CHLORIDE, SODIUM GLUCONATE, SODIUM ACETATE, POTASSIUM CHLORIDE, MAGNESIUM CHLORIDE, SODIUM PHOSPHATE, DIBASIC, AND POTASSIUM PHOSPHATE 1000 ML: .53; .5; .37; .037; .03; .012; .00082 INJECTION, SOLUTION INTRAVENOUS at 11:38

## 2024-09-18 RX ADMIN — METHYLPREDNISOLONE SODIUM SUCCINATE 40 MG: 40 INJECTION, POWDER, FOR SOLUTION INTRAMUSCULAR; INTRAVENOUS at 11:34

## 2024-09-18 NOTE — PROGRESS NOTES
Celine Roa tolerated IV hydration and premeds well with no complications.      Celine Roa is aware of future appt on 09/24/24 at 1100.     AVS decline.d

## 2024-09-24 ENCOUNTER — HOSPITAL ENCOUNTER (OUTPATIENT)
Dept: INFUSION CENTER | Facility: HOSPITAL | Age: 49
Discharge: HOME | End: 2024-09-24
Attending: INTERNAL MEDICINE
Payer: COMMERCIAL

## 2024-09-24 VITALS
TEMPERATURE: 98.5 F | SYSTOLIC BLOOD PRESSURE: 117 MMHG | RESPIRATION RATE: 18 BRPM | HEART RATE: 100 BPM | DIASTOLIC BLOOD PRESSURE: 83 MMHG

## 2024-09-24 DIAGNOSIS — R19.7 VOMITING AND DIARRHEA: ICD-10-CM

## 2024-09-24 DIAGNOSIS — R11.10 VOMITING AND DIARRHEA: ICD-10-CM

## 2024-09-24 DIAGNOSIS — E44.1 MILD PROTEIN-CALORIE MALNUTRITION (HCC): ICD-10-CM

## 2024-09-24 DIAGNOSIS — R63.0 ANOREXIA: ICD-10-CM

## 2024-09-24 DIAGNOSIS — D50.0 IRON DEFICIENCY ANEMIA DUE TO CHRONIC BLOOD LOSS: Primary | ICD-10-CM

## 2024-09-24 DIAGNOSIS — E61.1 IRON DEFICIENCY: ICD-10-CM

## 2024-09-24 PROCEDURE — 96365 THER/PROPH/DIAG IV INF INIT: CPT

## 2024-09-24 PROCEDURE — 96367 TX/PROPH/DG ADDL SEQ IV INF: CPT

## 2024-09-24 PROCEDURE — 96375 TX/PRO/DX INJ NEW DRUG ADDON: CPT

## 2024-09-24 PROCEDURE — 96366 THER/PROPH/DIAG IV INF ADDON: CPT

## 2024-09-24 RX ORDER — SODIUM CHLORIDE, SODIUM GLUCONATE, SODIUM ACETATE, POTASSIUM CHLORIDE, MAGNESIUM CHLORIDE, SODIUM PHOSPHATE, DIBASIC, AND POTASSIUM PHOSPHATE .53; .5; .37; .037; .03; .012; .00082 G/100ML; G/100ML; G/100ML; G/100ML; G/100ML; G/100ML; G/100ML
1000 INJECTION, SOLUTION INTRAVENOUS ONCE
Status: COMPLETED | OUTPATIENT
Start: 2024-09-24 | End: 2024-09-24

## 2024-09-24 RX ORDER — METHYLPREDNISOLONE SODIUM SUCCINATE 40 MG/ML
40 INJECTION, POWDER, LYOPHILIZED, FOR SOLUTION INTRAMUSCULAR; INTRAVENOUS ONCE
Status: CANCELLED
Start: 2024-09-27 | End: 2024-09-25

## 2024-09-24 RX ORDER — SODIUM CHLORIDE, SODIUM GLUCONATE, SODIUM ACETATE, POTASSIUM CHLORIDE, MAGNESIUM CHLORIDE, SODIUM PHOSPHATE, DIBASIC, AND POTASSIUM PHOSPHATE .53; .5; .37; .037; .03; .012; .00082 G/100ML; G/100ML; G/100ML; G/100ML; G/100ML; G/100ML; G/100ML
1000 INJECTION, SOLUTION INTRAVENOUS ONCE
Status: CANCELLED
Start: 2024-09-27 | End: 2024-09-25

## 2024-09-24 RX ORDER — METHYLPREDNISOLONE SODIUM SUCCINATE 40 MG/ML
40 INJECTION, POWDER, LYOPHILIZED, FOR SOLUTION INTRAMUSCULAR; INTRAVENOUS ONCE
Status: COMPLETED | OUTPATIENT
Start: 2024-09-24 | End: 2024-09-24

## 2024-09-24 RX ADMIN — DIPHENHYDRAMINE HYDROCHLORIDE 50 MG: 50 INJECTION, SOLUTION INTRAMUSCULAR; INTRAVENOUS at 11:22

## 2024-09-24 RX ADMIN — SODIUM CHLORIDE, SODIUM GLUCONATE, SODIUM ACETATE, POTASSIUM CHLORIDE, MAGNESIUM CHLORIDE, SODIUM PHOSPHATE, DIBASIC, AND POTASSIUM PHOSPHATE 1000 ML: .53; .5; .37; .037; .03; .012; .00082 INJECTION, SOLUTION INTRAVENOUS at 12:29

## 2024-09-24 RX ADMIN — METHYLPREDNISOLONE SODIUM SUCCINATE 40 MG: 40 INJECTION, POWDER, FOR SOLUTION INTRAMUSCULAR; INTRAVENOUS at 12:26

## 2024-09-24 RX ADMIN — FAMOTIDINE 40 MG: 10 INJECTION, SOLUTION INTRAVENOUS at 11:53

## 2024-09-25 LAB — COLOGUARD RESULT REPORTABLE: NORMAL

## 2024-09-27 ENCOUNTER — HOSPITAL ENCOUNTER (OUTPATIENT)
Dept: INFUSION CENTER | Facility: HOSPITAL | Age: 49
End: 2024-09-27
Attending: INTERNAL MEDICINE
Payer: COMMERCIAL

## 2024-09-27 VITALS
TEMPERATURE: 97.6 F | SYSTOLIC BLOOD PRESSURE: 123 MMHG | DIASTOLIC BLOOD PRESSURE: 82 MMHG | HEART RATE: 97 BPM | RESPIRATION RATE: 17 BRPM | OXYGEN SATURATION: 98 %

## 2024-09-27 DIAGNOSIS — R19.7 VOMITING AND DIARRHEA: ICD-10-CM

## 2024-09-27 DIAGNOSIS — R11.10 VOMITING AND DIARRHEA: ICD-10-CM

## 2024-09-27 DIAGNOSIS — R63.0 ANOREXIA: ICD-10-CM

## 2024-09-27 DIAGNOSIS — D50.0 IRON DEFICIENCY ANEMIA DUE TO CHRONIC BLOOD LOSS: Primary | ICD-10-CM

## 2024-09-27 DIAGNOSIS — E61.1 IRON DEFICIENCY: ICD-10-CM

## 2024-09-27 DIAGNOSIS — E44.1 MILD PROTEIN-CALORIE MALNUTRITION (HCC): ICD-10-CM

## 2024-09-27 PROCEDURE — 96375 TX/PRO/DX INJ NEW DRUG ADDON: CPT

## 2024-09-27 PROCEDURE — 96367 TX/PROPH/DG ADDL SEQ IV INF: CPT

## 2024-09-27 PROCEDURE — 96365 THER/PROPH/DIAG IV INF INIT: CPT

## 2024-09-27 PROCEDURE — 96366 THER/PROPH/DIAG IV INF ADDON: CPT

## 2024-09-27 RX ORDER — METHYLPREDNISOLONE SODIUM SUCCINATE 40 MG/ML
40 INJECTION, POWDER, LYOPHILIZED, FOR SOLUTION INTRAMUSCULAR; INTRAVENOUS ONCE
Status: COMPLETED | OUTPATIENT
Start: 2024-09-27 | End: 2024-09-27

## 2024-09-27 RX ORDER — SODIUM CHLORIDE, SODIUM GLUCONATE, SODIUM ACETATE, POTASSIUM CHLORIDE, MAGNESIUM CHLORIDE, SODIUM PHOSPHATE, DIBASIC, AND POTASSIUM PHOSPHATE .53; .5; .37; .037; .03; .012; .00082 G/100ML; G/100ML; G/100ML; G/100ML; G/100ML; G/100ML; G/100ML
1000 INJECTION, SOLUTION INTRAVENOUS ONCE
Start: 2024-09-27 | End: 2024-09-28

## 2024-09-27 RX ORDER — SODIUM CHLORIDE, SODIUM GLUCONATE, SODIUM ACETATE, POTASSIUM CHLORIDE, MAGNESIUM CHLORIDE, SODIUM PHOSPHATE, DIBASIC, AND POTASSIUM PHOSPHATE .53; .5; .37; .037; .03; .012; .00082 G/100ML; G/100ML; G/100ML; G/100ML; G/100ML; G/100ML; G/100ML
1000 INJECTION, SOLUTION INTRAVENOUS ONCE
Status: COMPLETED | OUTPATIENT
Start: 2024-09-27 | End: 2024-09-27

## 2024-09-27 RX ORDER — METHYLPREDNISOLONE SODIUM SUCCINATE 40 MG/ML
40 INJECTION, POWDER, LYOPHILIZED, FOR SOLUTION INTRAMUSCULAR; INTRAVENOUS ONCE
Start: 2024-09-27 | End: 2024-09-28

## 2024-09-27 RX ADMIN — METHYLPREDNISOLONE SODIUM SUCCINATE 40 MG: 40 INJECTION, POWDER, FOR SOLUTION INTRAMUSCULAR; INTRAVENOUS at 11:03

## 2024-09-27 RX ADMIN — SODIUM CHLORIDE, SODIUM GLUCONATE, SODIUM ACETATE, POTASSIUM CHLORIDE, MAGNESIUM CHLORIDE, SODIUM PHOSPHATE, DIBASIC, AND POTASSIUM PHOSPHATE 1000 ML: .53; .5; .37; .037; .03; .012; .00082 INJECTION, SOLUTION INTRAVENOUS at 11:11

## 2024-09-27 RX ADMIN — DIPHENHYDRAMINE HYDROCHLORIDE 50 MG: 50 INJECTION, SOLUTION INTRAMUSCULAR; INTRAVENOUS at 09:55

## 2024-09-27 RX ADMIN — FAMOTIDINE 40 MG: 10 INJECTION, SOLUTION INTRAVENOUS at 10:29

## 2024-09-27 NOTE — PLAN OF CARE
Problem: Knowledge Deficit  Goal: Patient/family/caregiver demonstrates understanding of disease process, treatment plan, medications, and discharge instructions  Description: Complete learning assessment and assess knowledge base.  Interventions:  - Provide teaching at level of understanding  - Provide teaching via preferred learning methods  Outcome: Progressing      Patient arrived via EMS for chest pain, EKG obtained 2042 prior to going to waiting room to await triage

## 2024-10-01 ENCOUNTER — HOSPITAL ENCOUNTER (OUTPATIENT)
Dept: INFUSION CENTER | Facility: HOSPITAL | Age: 49
Discharge: HOME | End: 2024-10-01
Attending: INTERNAL MEDICINE
Payer: COMMERCIAL

## 2024-10-01 VITALS
RESPIRATION RATE: 16 BRPM | DIASTOLIC BLOOD PRESSURE: 79 MMHG | HEART RATE: 89 BPM | TEMPERATURE: 97.8 F | SYSTOLIC BLOOD PRESSURE: 118 MMHG | OXYGEN SATURATION: 98 %

## 2024-10-01 DIAGNOSIS — R11.10 VOMITING AND DIARRHEA: ICD-10-CM

## 2024-10-01 DIAGNOSIS — E61.1 IRON DEFICIENCY: ICD-10-CM

## 2024-10-01 DIAGNOSIS — R19.7 VOMITING AND DIARRHEA: ICD-10-CM

## 2024-10-01 DIAGNOSIS — R63.0 ANOREXIA: ICD-10-CM

## 2024-10-01 DIAGNOSIS — L50.1 CHRONIC IDIOPATHIC URTICARIA: ICD-10-CM

## 2024-10-01 DIAGNOSIS — D50.0 IRON DEFICIENCY ANEMIA DUE TO CHRONIC BLOOD LOSS: Primary | ICD-10-CM

## 2024-10-01 DIAGNOSIS — E44.1 MILD PROTEIN-CALORIE MALNUTRITION (HCC): ICD-10-CM

## 2024-10-01 PROCEDURE — 96367 TX/PROPH/DG ADDL SEQ IV INF: CPT

## 2024-10-01 PROCEDURE — 96365 THER/PROPH/DIAG IV INF INIT: CPT

## 2024-10-01 PROCEDURE — 96372 THER/PROPH/DIAG INJ SC/IM: CPT

## 2024-10-01 PROCEDURE — 96366 THER/PROPH/DIAG IV INF ADDON: CPT

## 2024-10-01 PROCEDURE — 96375 TX/PRO/DX INJ NEW DRUG ADDON: CPT

## 2024-10-01 RX ORDER — SODIUM CHLORIDE, SODIUM GLUCONATE, SODIUM ACETATE, POTASSIUM CHLORIDE, MAGNESIUM CHLORIDE, SODIUM PHOSPHATE, DIBASIC, AND POTASSIUM PHOSPHATE .53; .5; .37; .037; .03; .012; .00082 G/100ML; G/100ML; G/100ML; G/100ML; G/100ML; G/100ML; G/100ML
1000 INJECTION, SOLUTION INTRAVENOUS ONCE
Status: CANCELLED
Start: 2024-10-04 | End: 2024-10-02

## 2024-10-01 RX ORDER — EPINEPHRINE 1 MG/ML
0.3 INJECTION, SOLUTION, CONCENTRATE INTRAVENOUS
OUTPATIENT
Start: 2024-10-04

## 2024-10-01 RX ORDER — METHYLPREDNISOLONE SODIUM SUCCINATE 40 MG/ML
40 INJECTION, POWDER, LYOPHILIZED, FOR SOLUTION INTRAMUSCULAR; INTRAVENOUS ONCE
Status: COMPLETED | OUTPATIENT
Start: 2024-10-01 | End: 2024-10-01

## 2024-10-01 RX ORDER — METHYLPREDNISOLONE SODIUM SUCCINATE 40 MG/ML
40 INJECTION, POWDER, LYOPHILIZED, FOR SOLUTION INTRAMUSCULAR; INTRAVENOUS ONCE
Status: CANCELLED
Start: 2024-10-04 | End: 2024-10-02

## 2024-10-01 RX ORDER — SODIUM CHLORIDE, SODIUM GLUCONATE, SODIUM ACETATE, POTASSIUM CHLORIDE, MAGNESIUM CHLORIDE, SODIUM PHOSPHATE, DIBASIC, AND POTASSIUM PHOSPHATE .53; .5; .37; .037; .03; .012; .00082 G/100ML; G/100ML; G/100ML; G/100ML; G/100ML; G/100ML; G/100ML
1000 INJECTION, SOLUTION INTRAVENOUS ONCE
Status: COMPLETED | OUTPATIENT
Start: 2024-10-01 | End: 2024-10-01

## 2024-10-01 RX ORDER — EPINEPHRINE 1 MG/ML
0.3 INJECTION, SOLUTION, CONCENTRATE INTRAVENOUS
Status: DISCONTINUED | OUTPATIENT
Start: 2024-10-01 | End: 2024-10-04 | Stop reason: HOSPADM

## 2024-10-01 RX ADMIN — OMALIZUMAB 300 MG: 150 INJECTION, SOLUTION SUBCUTANEOUS at 14:53

## 2024-10-01 RX ADMIN — METHYLPREDNISOLONE SODIUM SUCCINATE 40 MG: 40 INJECTION, POWDER, FOR SOLUTION INTRAMUSCULAR; INTRAVENOUS at 12:40

## 2024-10-01 RX ADMIN — SODIUM CHLORIDE, SODIUM GLUCONATE, SODIUM ACETATE, POTASSIUM CHLORIDE, MAGNESIUM CHLORIDE, SODIUM PHOSPHATE, DIBASIC, AND POTASSIUM PHOSPHATE 1000 ML: .53; .5; .37; .037; .03; .012; .00082 INJECTION, SOLUTION INTRAVENOUS at 12:45

## 2024-10-01 RX ADMIN — FAMOTIDINE 40 MG: 10 INJECTION, SOLUTION INTRAVENOUS at 12:03

## 2024-10-01 RX ADMIN — DIPHENHYDRAMINE HYDROCHLORIDE 50 MG: 50 INJECTION, SOLUTION INTRAMUSCULAR; INTRAVENOUS at 11:27

## 2024-10-01 NOTE — PROGRESS NOTES
Celine Roa tolerated IV hydration and subq Xolair well with no complications.      Celine Roa is aware of future appt on 10/04/24 at 1130.     AVS declined.

## 2024-10-04 ENCOUNTER — HOSPITAL ENCOUNTER (OUTPATIENT)
Dept: INFUSION CENTER | Facility: HOSPITAL | Age: 49
End: 2024-10-04
Attending: INTERNAL MEDICINE
Payer: COMMERCIAL

## 2024-10-04 VITALS
HEART RATE: 95 BPM | RESPIRATION RATE: 16 BRPM | TEMPERATURE: 98.2 F | OXYGEN SATURATION: 97 % | DIASTOLIC BLOOD PRESSURE: 80 MMHG | SYSTOLIC BLOOD PRESSURE: 112 MMHG

## 2024-10-04 DIAGNOSIS — D50.0 IRON DEFICIENCY ANEMIA DUE TO CHRONIC BLOOD LOSS: Primary | ICD-10-CM

## 2024-10-04 DIAGNOSIS — R19.7 VOMITING AND DIARRHEA: ICD-10-CM

## 2024-10-04 DIAGNOSIS — R11.10 VOMITING AND DIARRHEA: ICD-10-CM

## 2024-10-04 DIAGNOSIS — E44.1 MILD PROTEIN-CALORIE MALNUTRITION (HCC): ICD-10-CM

## 2024-10-04 DIAGNOSIS — E61.1 IRON DEFICIENCY: ICD-10-CM

## 2024-10-04 DIAGNOSIS — R63.0 ANOREXIA: ICD-10-CM

## 2024-10-04 PROCEDURE — 96365 THER/PROPH/DIAG IV INF INIT: CPT

## 2024-10-04 PROCEDURE — 96375 TX/PRO/DX INJ NEW DRUG ADDON: CPT

## 2024-10-04 PROCEDURE — 96366 THER/PROPH/DIAG IV INF ADDON: CPT

## 2024-10-04 PROCEDURE — 96367 TX/PROPH/DG ADDL SEQ IV INF: CPT

## 2024-10-04 RX ORDER — METHYLPREDNISOLONE SODIUM SUCCINATE 40 MG/ML
40 INJECTION, POWDER, LYOPHILIZED, FOR SOLUTION INTRAMUSCULAR; INTRAVENOUS ONCE
Status: CANCELLED
Start: 2024-10-04 | End: 2024-10-05

## 2024-10-04 RX ORDER — SODIUM CHLORIDE, SODIUM GLUCONATE, SODIUM ACETATE, POTASSIUM CHLORIDE, MAGNESIUM CHLORIDE, SODIUM PHOSPHATE, DIBASIC, AND POTASSIUM PHOSPHATE .53; .5; .37; .037; .03; .012; .00082 G/100ML; G/100ML; G/100ML; G/100ML; G/100ML; G/100ML; G/100ML
1000 INJECTION, SOLUTION INTRAVENOUS ONCE
Status: COMPLETED | OUTPATIENT
Start: 2024-10-04 | End: 2024-10-04

## 2024-10-04 RX ORDER — SODIUM CHLORIDE, SODIUM GLUCONATE, SODIUM ACETATE, POTASSIUM CHLORIDE, MAGNESIUM CHLORIDE, SODIUM PHOSPHATE, DIBASIC, AND POTASSIUM PHOSPHATE .53; .5; .37; .037; .03; .012; .00082 G/100ML; G/100ML; G/100ML; G/100ML; G/100ML; G/100ML; G/100ML
1000 INJECTION, SOLUTION INTRAVENOUS ONCE
Status: CANCELLED
Start: 2024-10-04 | End: 2024-10-05

## 2024-10-04 RX ORDER — METHYLPREDNISOLONE SODIUM SUCCINATE 40 MG/ML
40 INJECTION, POWDER, LYOPHILIZED, FOR SOLUTION INTRAMUSCULAR; INTRAVENOUS ONCE
Status: COMPLETED | OUTPATIENT
Start: 2024-10-04 | End: 2024-10-04

## 2024-10-04 RX ADMIN — METHYLPREDNISOLONE SODIUM SUCCINATE 40 MG: 40 INJECTION, POWDER, FOR SOLUTION INTRAMUSCULAR; INTRAVENOUS at 13:27

## 2024-10-04 RX ADMIN — DIPHENHYDRAMINE HYDROCHLORIDE 50 MG: 50 INJECTION, SOLUTION INTRAMUSCULAR; INTRAVENOUS at 11:59

## 2024-10-04 RX ADMIN — FAMOTIDINE 40 MG: 10 INJECTION, SOLUTION INTRAVENOUS at 12:51

## 2024-10-04 RX ADMIN — SODIUM CHLORIDE, SODIUM GLUCONATE, SODIUM ACETATE, POTASSIUM CHLORIDE, MAGNESIUM CHLORIDE, SODIUM PHOSPHATE, DIBASIC, AND POTASSIUM PHOSPHATE 1000 ML: .53; .5; .37; .037; .03; .012; .00082 INJECTION, SOLUTION INTRAVENOUS at 13:33

## 2024-10-04 NOTE — PROGRESS NOTES
Pt tolerated Isolyte hydration & pre-meds well without complications. PIV removed without incident.      Celine Roa is aware of future appt on 10/15/24 at 8AM.      AVS declined by Celine Roa.     Pt discharged off unit in stable condition with all personal belongings.

## 2024-10-04 NOTE — PLAN OF CARE
Problem: Potential for Falls  Goal: Patient will remain free of falls  Description: INTERVENTIONS:  - Educate patient/family on patient safety including physical limitations  - Instruct patient to call for assistance with activity   - Consult OT/PT to assist with strengthening/mobility   - Keep Call bell within reach  - Keep bed low and locked with side rails adjusted as appropriate  - Keep care items and personal belongings within reach  - Initiate and maintain comfort rounds  - Make Fall Risk Sign visible to staff  - Consider moving patient to room near nurses station  Outcome: Progressing     Problem: INFECTION - ADULT  Goal: Absence or prevention of progression during hospitalization  Description: INTERVENTIONS:  - Assess and monitor for signs and symptoms of infection  - Monitor lab/diagnostic results  - Monitor all insertion sites, i.e. indwelling lines, tubes, and drains  - Monitor endotracheal if appropriate and nasal secretions for changes in amount and color  - [place] appropriate cooling/warming therapies per order  - Administer medications as ordered  - Instruct and encourage patient and family to use good hand hygiene technique  - Identify and instruct in appropriate isolation precautions for identified infection/condition  Outcome: Progressing     Problem: Knowledge Deficit  Goal: Patient/family/caregiver demonstrates understanding of disease process, treatment plan, medications, and discharge instructions  Description: Complete learning assessment and assess knowledge base.  Interventions:  - Provide teaching at level of understanding  - Provide teaching via preferred learning methods  Outcome: Progressing

## 2024-10-07 DIAGNOSIS — D89.40 MAST CELL ACTIVATION SYNDROME (HCC): ICD-10-CM

## 2024-10-08 RX ORDER — FAMOTIDINE 40 MG/1
40 TABLET, FILM COATED ORAL DAILY
Qty: 90 TABLET | Refills: 0 | Status: SHIPPED | OUTPATIENT
Start: 2024-10-08

## 2024-10-09 ENCOUNTER — TELEMEDICINE (OUTPATIENT)
Dept: GASTROENTEROLOGY | Facility: CLINIC | Age: 49
End: 2024-10-09
Payer: COMMERCIAL

## 2024-10-09 ENCOUNTER — TELEPHONE (OUTPATIENT)
Dept: GASTROENTEROLOGY | Facility: CLINIC | Age: 49
End: 2024-10-09

## 2024-10-09 VITALS — HEIGHT: 60 IN | BODY MASS INDEX: 32.16 KG/M2

## 2024-10-09 DIAGNOSIS — D84.9 IMMUNOSUPPRESSED STATUS (HCC): ICD-10-CM

## 2024-10-09 DIAGNOSIS — R19.7 DIARRHEA, UNSPECIFIED TYPE: ICD-10-CM

## 2024-10-09 DIAGNOSIS — K50.10 CROHN'S DISEASE OF COLON WITHOUT COMPLICATION (HCC): Primary | ICD-10-CM

## 2024-10-09 DIAGNOSIS — D89.40 MAST CELL ACTIVATION SYNDROME (HCC): ICD-10-CM

## 2024-10-09 DIAGNOSIS — K50.014 CROHN'S DISEASE OF SMALL INTESTINE WITH ABSCESS (HCC): ICD-10-CM

## 2024-10-09 PROCEDURE — 99214 OFFICE O/P EST MOD 30 MIN: CPT | Performed by: INTERNAL MEDICINE

## 2024-10-09 NOTE — ASSESSMENT & PLAN NOTE
The patient is a 49-year-old woman with hereditary alpha tryptase, Hashimoto's, inflammatory bowel disease thought to be Crohn's, diagnosis of mast cell activation syndrome from [facility], anti-TNF induced lupus, previously on Stelara and most recently on Skyrizi but subsequently off medication and presenting for follow-up. She is now steroid dependent and her symptoms worsen as she tries to wean off.     Screening colonoscopy May 2023 with gastric erosion and colonic granular mucosa and biopsies with chronic active colitis.  Sigmoidoscopy September 2023 with some abnormal mucosa and relatively benign colon biopsies.    ERCP February 2024 with antral erosion and prior sphincterotomy and balloon sweeps notable for slight debris but no filling defects.    EF 60% and echocardiogram  MRI/MRCP with stable chronic intra and extrahepatic biliary ductal dilation.  CT from August with proctocolitis, b/l nephrolithiasis and hepatomegaly.     CMP with low calcium, total protein and albumin but otherwise normal electrolytes, Cr and LFTs. CBC with elevated WBC and plts, but normal Hgb and MCV. Calprotectin >3300      We discussed risks and benefits of Rinvoq and she would like to proceed.   Try to wean off prednisone  Obtain blood work, calprotectin  Check bowel ultrasound    Orders:    CBC and differential; Future    Comprehensive metabolic panel; Future    C-reactive protein; Future    Calprotectin,Fecal; Future    Quantiferon TB Gold Plus Assay; Future    Chronic Hepatitis Panel; Future

## 2024-10-09 NOTE — PROGRESS NOTES
Virtual Regular Visit  Name: Celine Roa      : 1975      MRN: [id number]  Encounter Provider: Yehuda Levine MD  Encounter Date: 10/9/2024   Encounter department: [facility] [place]    Verification of patient location:    Patient is located at Home in the following state in which I hold an active license [place]    Assessment & Plan  Crohn's disease of colon without complication (HCC)  The patient is a 49-year-old woman with hereditary alpha tryptase, Hashimoto's, inflammatory bowel disease thought to be Crohn's, diagnosis of mast cell activation syndrome from [facility], anti-TNF induced lupus, previously on Stelara and most recently on Skyrizi but subsequently off medication and presenting for follow-up. She is now steroid dependent and her symptoms worsen as she tries to wean off.     Screening colonoscopy May 2023 with gastric erosion and colonic granular mucosa and biopsies with chronic active colitis.  Sigmoidoscopy 2023 with some abnormal mucosa and relatively benign colon biopsies.    ERCP 2024 with antral erosion and prior sphincterotomy and balloon sweeps notable for slight debris but no filling defects.    EF 60% and echocardiogram  MRI/MRCP with stable chronic intra and extrahepatic biliary ductal dilation.  CT from August with proctocolitis, b/l nephrolithiasis and hepatomegaly.     CMP with low calcium, total protein and albumin but otherwise normal electrolytes, Cr and LFTs. CBC with elevated WBC and plts, but normal Hgb and MCV. Calprotectin >3300      We discussed risks and benefits of Rinvoq and she would like to proceed.   Try to wean off prednisone  Obtain blood work, calprotectin  Check bowel ultrasound    Orders:    CBC and differential; Future    Comprehensive metabolic panel; Future    C-reactive protein; Future    Calprotectin,Fecal; Future    Quantiferon TB Gold Plus Assay; Future    Chronic Hepatitis Panel;  Future    Immunosuppressed status (HCC)  Avoid live virus vaccines  Yearly flu shot  COVID vaccine and booster  Pneumonia vaccine  Shingrix  Routine skin exams with the dermatologist    Orders:    CBC and differential; Future    Comprehensive metabolic panel; Future    C-reactive protein; Future    Calprotectin,Fecal; Future    Quantiferon TB Gold Plus Assay; Future    Chronic Hepatitis Panel; Future    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HCC)  Continue current protocol including infusions  Avoid triggers       Crohn's disease of small intestine with abscess (HCC)    Orders:    US abdomen limited; Future    Diarrhea, unspecified type    Orders:    CBC and differential; Future    Comprehensive metabolic panel; Future    C-reactive protein; Future    Calprotectin,Fecal; Future    Quantiferon TB Gold Plus Assay; Future    Chronic Hepatitis Panel; Future        Encounter provider Yehuda Levine MD    The patient was identified by name and date of birth. Celine Roa was informed that this is a telemedicine visit and that the visit is being conducted through Telephone.  My office door was closed. No one else was in the room.  She acknowledged consent and understanding of privacy and security of the video platform. The patient has agreed to participate and understands they can discontinue the visit at any time.    Patient is aware this is a billable service.     It was my intent to perform this visit via video technology but the patient was not able to do a video connection so the visit was completed via audio telephone only.      History of Present Illness     Celine Roa is a 49 y.o. female who presents with Crohn's.     She is not great but okay. Not missed work since August when hospitalized. Fluids helping. As she tries to get off prednisone symptoms return.   2-3 stools per day, somewhat formed. No blood. Mild abdominal pain in the lower abdomen. . As she weans off prednisone the diarrhea and pain worsen, but now  she is on prednisone.     History obtained from : patient    Review of systems:  10 point ROS reviewed and negative, except as above      Pertinent Medical History     Medical History Reviewed by provider this encounter:       Past Medical History   Past Medical History:   Diagnosis Date    Anemia 2007    Anxiety     Asthma     Bile duct stricture 2014    Sphincter of oddi dysfunction    Chronic kidney disease     Closed bimalleolar fracture 02/22/2024    Colon polyp 1998    Crohn's colitis (HCC)     Cyst of ovary 02/22/2024    Deep vein thrombosis (HCC)     Disease of thyroid gland     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 pt denies, documented in cardio note    Pain of right thigh 02/13/2023    Pancreatitis     sphinger of     Psychiatric disorder     RA (rheumatoid arthritis) (HCC)     Seizures (HCC)     Sepsis without acute organ dysfunction (HCC)     SIRS (systemic inflammatory response syndrome) (HCC)     Thrombocytosis 04/05/2022    Ulcerative colitis (HCC)     Visual impairment      Past Surgical History:   Procedure Laterality Date    ABDOMINAL SURGERY  2017    APPENDECTOMY      CHOLECYSTECTOMY      COLONOSCOPY  2019    CYSTOSCOPY N/A 06/08/2023    Procedure: CYSTOSCOPY, mini TURBT with fulgeration;  Surgeon: Tee Bruno MD;  Location: [facility];  Service: Urology    EGD AND COLONOSCOPY N/A 09/12/2017    Procedure: EGD AND COLONOSCOPY;  Surgeon: Tommy Oliver MD;  Location:  GI LAB;  Service: Gastroenterology    ERCP N/A 09/15/2017    Procedure: ENDOSCOPIC RETROGRADE CHOLANGIOPANCREATOGRAPHY (ERCP);  Surgeon: Dre Soto MD;  Location: [facility];  Service: Gastroenterology    ERCP   02/14/2024    HYSTERECTOMY      KNEE SURGERY Right     LAPAROSCOPIC ENDOMETRIOSIS FULGURATION      ORIF ANKLE FRACTURE Left     IL ARTHRS KNE SURG W/MENISCECTOMY MED/LAT W/SHVG Left 05/12/2017    Procedure: POSSIBLE MEDIAL MENISECTOMY;  Surgeon: Kristian Avila MD;  Location: [facility];  Service: Orthopedics    IL ARTHRS KNEE DEBRIDEMENT/SHAVING ARTCLR CRTLG Left 05/12/2017    Procedure: KNEE ARTHROSCOPY EVALUATION, CHONDROPLASTY;  Surgeon: Kristian Avila MD;  Location: [facility];  Service: Orthopedics    IL LAPS ABD PRTM&OMENTUM DX W/WO SPEC BR/WA SPX N/A 12/12/2017    Procedure: LAPAROSCOPY DIAGNOSTIC  WITH LYSIS OF ADHESIONS;  Surgeon: Olaf John DO;  Location:  MAIN OR;  Service: General    UPPER GASTROINTESTINAL ENDOSCOPY  2019     Family History   Problem Relation Age of Onset    Ulcerative colitis Mother     Arthritis Mother     Colon polyps Mother     Heart failure Father     Neuropathy Father     Macular degeneration Father     Diabetes Father     Early death Father     Vision loss Father     Asthma Daughter     Hypothyroidism Daughter     Heart disease Maternal Grandmother     Arthritis Maternal Grandmother     No Known Problems Maternal Grandfather     Arthritis Paternal Grandmother     Macular degeneration Paternal Grandmother     Vision loss Paternal Grandmother     Lymphoma Paternal Grandfather     Cancer Paternal Grandfather     Early death Paternal Grandfather     Breast cancer Maternal Aunt     Cancer Maternal Aunt     Miscarriages / Stillbirths Maternal Aunt     Heart failure Paternal Aunt     Heart failure Paternal Aunt     Irritable bowel syndrome Paternal Aunt     Breast cancer Paternal Aunt 54    Breast cancer additional onset Paternal Aunt 58    Hypothyroidism Paternal Aunt     Cancer Paternal Aunt     No Known Problems Son     No Known Problems Son     Kidney disease Brother     Depression Paternal Uncle     Early death Paternal Uncle      Current Outpatient Medications on File Prior to  "Visit   Medication Sig Dispense Refill    Ascorbic Acid (vitamin C) 1000 MG tablet Take 1,000 mg by mouth 2 (two) times a day      B Complex Vitamins (VITAMIN B COMPLEX PO) Take 100 mg by mouth daily      B-D 3CC LUER-SHAMEKA SYR 25GX1\" 25G X 1\" 3 ML MISC       budesonide (ENTOCORT EC) 3 MG capsule Take 3 capsules (9 mg total) by mouth daily 90 capsule 3    butalbital-acetaminophen-caffeine (FIORICET,ESGIC) -40 mg per tablet Take 1 tablet by mouth every 8 (eight) hours as needed for migraine 20 tablet 1    cetirizine (ZyrTEC) 10 mg tablet Take 20 mg by mouth 2 (two) times a day      cholecalciferol (VITAMIN D3) 1,000 units tablet Take 5,000 Units by mouth daily      cyanocobalamin (VITAMIN B-12) 100 mcg tablet Take by mouth daily      diphenhydrAMINE (BENADRYL) 50 mg capsule Take 50 mg by mouth 4 (four) times a day as needed for itching Pt states takes 50mg daily, but may go up to 4 times daily prn      diphenhydrAMINE (BENADRYL) 50 mg/mL Inject 1 mL (50 mg total) into a catheter in a vein every 12 (twelve) hours as needed for itching 10 mL 3    EPINEPHrine (EPIPEN) 0.3 mg/0.3 mL SOAJ Inject 0.3 mL (0.3 mg total) into a muscle daily as needed for anaphylaxis No substitutions with generic 0.6 mL 3    ergocalciferol (VITAMIN D2) 50,000 units Take 50,000 Units by mouth once a week      famotidine (PEPCID) 40 MG tablet TAKE ONE TABLET BY MOUTH ONCE DAILY 90 tablet 0    fluconazole (DIFLUCAN) 100 mg tablet       folic acid (KP Folic Acid) 1 mg tablet Take 1 tablet (1 mg total) by mouth daily 30 tablet 3    furosemide (LASIX) 20 mg tablet Take 20 mg by mouth if needed      hydrOXYzine HCL (ATARAX) 25 mg tablet Take 1 tablet (25 mg total) by mouth daily as needed for itching 30 tablet 1    HYDROXYZINE HCL PO Take 25 mg by mouth 4 (four) times a day as needed      hyoscyamine (ANASPAZ,LEVSIN) 0.125 MG tablet Take 0.125 mg by mouth every 6 (six) hours as needed      Lactobacillus (PROBIOTIC ACIDOPHILUS PO) Take by mouth   " "   Lactobacillus-Inulin ([facility]) CAPS Take 200 mg by mouth daily      leuprolide 1 mg/0.2 mL Inject 0.2 mL (1 mg total) under the skin in the morning 60 each 0    levalbuterol (XOPENEX HFA) 45 mcg/act inhaler Inhale 1-2 puffs every 4 (four) hours as needed for shortness of breath 45 g 2    levothyroxine 50 mcg tablet Take 75 mcg by mouth daily      liothyronine (CYTOMEL) 5 mcg tablet Take 1 tablet (5 mcg total) by mouth daily Do not start before June 14, 2023. 30 tablet 0    magnesium gluconate (MAGONATE) 500 mg tablet Take 500 mg by mouth 2 (two) times a day      Melatonin 3 MG SUBL 6 mg daily at bedtime      Methotrexate 25 MG/ML SOSY Inject 50 mg/m2 into a muscle      methotrexate 50 MG/2ML injection Inject 1 mL (25 mg total) under the skin once a week 4 mL 10    mometasone (ELOCON) 0.1 % cream Apply topically daily 45 g 1    montelukast (SINGULAIR) 10 mg tablet Take 1 tablet (10 mg total) by mouth daily at bedtime  0    NEEDLE, DISP, 27 G (B-D DISP NEEDLE 89ZU1-4/4\") 27G X 1-1/4\" MISC Use once a week 100 each 0    Omalizumab 300 MG/2  ML SOSY Inject 300 mg under the skin every 21 days      PEPPERMINT OIL PO Take 1 capsule by mouth 2 (two) times a day      predniSONE 20 mg tablet Take 40mg per day for 1 week, then 30mg per day for 1 week, then 20mg per day for 1 week and then 10mg per day for 1 week. 100 tablet 0    Probiotic Product (CULTURELLE ABDOMINAL SUPPORT PO) Take 200 mg by mouth daily      Riboflavin (Vitamin B-2) 100 MG TABS Take 100 mg by mouth 2 (two) times a day      TechLite Lancets MISC        No current facility-administered medications on file prior to visit.     Allergies   Allergen Reactions    Aimovig [Erenumab-Aooe] Anaphylaxis    Amitriptyline Anaphylaxis     According to the patient she had nphylaxis    Aspergillus Allergy Skin Test Other (See Comments), Hives and Swelling    Azathioprine Other (See Comments) and Anaphylaxis     pancreatitis  According to patient she " "needed Epipen    Buspar [Buspirone] Hives and Shortness Of Breath    Citric Acid-Polysorbate 80 Abdominal Pain, Anaphylaxis, Anxiety, Bradycardia, Diarrhea, Fatigue, GI Intolerance, Headache, Hives, Hyperactivity, Itching, Lip Swelling, Nausea Only, Palpitations, Rash, Shortness Of Breath, Tachycardia, Throat Swelling, Tongue Swelling and Vomiting    Corn Dextrin Anaphylaxis     Any corn based products    Erenumab Anaphylaxis     patient sent portal message stating she had anaphylaxis  patient sent portal message stating she had anaphylaxis      Gadolinium Abdominal Pain, Anaphylaxis, Anxiety, Confusion, Delirium, Dizziness, Eye Swelling, Fatigue, GI Intolerance, Headache, Hives, Irritability, Itching, Lip Swelling, Nausea Only, Palpitations, Photosensitivity, Rash, Seizures, Shortness Of Breath, Swelling, Syncope, Throat Swelling, Tongue Swelling, Tremor, Visual Disturbance and Vomiting    Gelatin - Food Allergy Anaphylaxis    Heparin Hives     Painful welts     Lavender Oil Anaphylaxis     Other reaction(s): anaphalxis    Malto Dextrin [Dextrin] Anaphylaxis    Penicillium Notatum (Diagnost) Shortness Of Breath and Swelling    Red Dye - Food Allergy Anaphylaxis and Other (See Comments)     intolerance    Sumatriptan Anaphylaxis     Bradycardia, sob, back pressure, head pain,throat tightness  According to the patient she had anphylaxis    Ustekinumab (Murine) Anaphylaxis    Lidocaine Hives, Itching and Rash    Contrast [Iodinated Contrast Media] Other (See Comments)     Pt states needs to be premedicated prior to injection    Corn Oil - Food Allergy - Food Allergy Hives    Humira [Adalimumab] Other (See Comments)     \"I develop drug induced lupus\"    Ibuprofen Hives and Throat Swelling    Polyethylene Glycol Diarrhea and Vomiting    Remicade [Infliximab] Other (See Comments)     \"I develop drug induced lupus\"    Sulfa Antibiotics Hives and Other (See Comments)    Sulfate Hives    Sulfites - Food Allergy Hives    " "Sweet Corn Extract Skin Test - Food Allergy Hives and Itching    Chlorhexidine Itching    Freederm Adhesive Remover [New Skin] Rash    Histamine Hives, Rash and Other (See Comments)     Intolerance      Wound Dressings Rash      Current Outpatient Medications on File Prior to Visit   Medication Sig Dispense Refill    Ascorbic Acid (vitamin C) 1000 MG tablet Take 1,000 mg by mouth 2 (two) times a day      B Complex Vitamins (VITAMIN B COMPLEX PO) Take 100 mg by mouth daily      B-D 3CC LUER-SHAMEKA SYR 25GX1\" 25G X 1\" 3 ML MISC       budesonide (ENTOCORT EC) 3 MG capsule Take 3 capsules (9 mg total) by mouth daily 90 capsule 3    butalbital-acetaminophen-caffeine (FIORICET,ESGIC) -40 mg per tablet Take 1 tablet by mouth every 8 (eight) hours as needed for migraine 20 tablet 1    cetirizine (ZyrTEC) 10 mg tablet Take 20 mg by mouth 2 (two) times a day      cholecalciferol (VITAMIN D3) 1,000 units tablet Take 5,000 Units by mouth daily      cyanocobalamin (VITAMIN B-12) 100 mcg tablet Take by mouth daily      diphenhydrAMINE (BENADRYL) 50 mg capsule Take 50 mg by mouth 4 (four) times a day as needed for itching Pt states takes 50mg daily, but may go up to 4 times daily prn      diphenhydrAMINE (BENADRYL) 50 mg/mL Inject 1 mL (50 mg total) into a catheter in a vein every 12 (twelve) hours as needed for itching 10 mL 3    EPINEPHrine (EPIPEN) 0.3 mg/0.3 mL SOAJ Inject 0.3 mL (0.3 mg total) into a muscle daily as needed for anaphylaxis No substitutions with generic 0.6 mL 3    ergocalciferol (VITAMIN D2) 50,000 units Take 50,000 Units by mouth once a week      famotidine (PEPCID) 40 MG tablet TAKE ONE TABLET BY MOUTH ONCE DAILY 90 tablet 0    fluconazole (DIFLUCAN) 100 mg tablet       folic acid (KP Folic Acid) 1 mg tablet Take 1 tablet (1 mg total) by mouth daily 30 tablet 3    furosemide (LASIX) 20 mg tablet Take 20 mg by mouth if needed      hydrOXYzine HCL (ATARAX) 25 mg tablet Take 1 tablet (25 mg total) by mouth " "daily as needed for itching 30 tablet 1    HYDROXYZINE HCL PO Take 25 mg by mouth 4 (four) times a day as needed      hyoscyamine (ANASPAZ,LEVSIN) 0.125 MG tablet Take 0.125 mg by mouth every 6 (six) hours as needed      Lactobacillus (PROBIOTIC ACIDOPHILUS PO) Take by mouth      Lactobacillus-Inulin ([employer] [facility]est[facility]) CAPS Take 200 mg by mouth daily      leuprolide 1 mg/0.2 mL Inject 0.2 mL (1 mg total) under the skin in the morning 60 each 0    levalbuterol (XOPENEX HFA) 45 mcg/act inhaler Inhale 1-2 puffs every 4 (four) hours as needed for shortness of breath 45 g 2    levothyroxine 50 mcg tablet Take 75 mcg by mouth daily      liothyronine (CYTOMEL) 5 mcg tablet Take 1 tablet (5 mcg total) by mouth daily Do not start before June 14, 2023. 30 tablet 0    magnesium gluconate (MAGONATE) 500 mg tablet Take 500 mg by mouth 2 (two) times a day      Melatonin 3 MG SUBL 6 mg daily at bedtime      Methotrexate 25 MG/ML SOSY Inject 50 mg/m2 into a muscle      methotrexate 50 MG/2ML injection Inject 1 mL (25 mg total) under the skin once a week 4 mL 10    mometasone (ELOCON) 0.1 % cream Apply topically daily 45 g 1    montelukast (SINGULAIR) 10 mg tablet Take 1 tablet (10 mg total) by mouth daily at bedtime  0    NEEDLE, DISP, 27 G (B-D DISP NEEDLE 14IC4-2/4\") 27G X 1-1/4\" MISC Use once a week 100 each 0    Omalizumab 300 MG/2  ML SOSY Inject 300 mg under the skin every 21 days      PEPPERMINT OIL PO Take 1 capsule by mouth 2 (two) times a day      predniSONE 20 mg tablet Take 40mg per day for 1 week, then 30mg per day for 1 week, then 20mg per day for 1 week and then 10mg per day for 1 week. 100 tablet 0    Probiotic Product (CULTURELLE ABDOMINAL SUPPORT PO) Take 200 mg by mouth daily      Riboflavin (Vitamin B-2) 100 MG TABS Take 100 mg by mouth 2 (two) times a day      TechLite Lancets MISC        No current facility-administered medications on file prior to visit.      Social History     Tobacco Use    " Smoking status: Never    Smokeless tobacco: Never   Vaping Use    Vaping status: Never Used   Substance and Sexual Activity    Alcohol use: Never    Drug use: Never    Sexual activity: Not Currently     Partners: Male     Birth control/protection: Other     Comment: Full hysterectomy         Objective     There were no vitals taken for this visit.    PHYSICAL EXAMINATION:  Unable to perform physical examination given the unavailability of a video application.         Visit Time  I have spent a total time of 30 minutes in caring for this patient on the day of the visit/encounter including Diagnostic results, Prognosis, Risks and benefits of tx options, Instructions for management, Patient and family education, Importance of tx compliance, Risk factor reductions, Impressions, Counseling / Coordination of care, Documenting in the medical record, Reviewing / ordering tests, medicine, procedures  , and Obtaining or reviewing history  .

## 2024-10-10 NOTE — ADDENDUM NOTE
Addended by: Linda Montero on: 4/14/2021 11:12 AM     Modules accepted: Ema Medication:   metoPROLOL tartrate (LOPRESSOR) 25 MG tablet      Possible duplicate: Aiden to review recent actions on this medication    Sig: TAKE 1 TABLET BY MOUTH IN THE MORNING AND IN THE EVENING    Disp: 180 tablet    Refills: Not specified    Start: 10/9/2024    Class: Eprescribe     Medication refill denied due to duplicate request. Sent to pharmacy on 10/9/2024

## 2024-10-15 ENCOUNTER — HOSPITAL ENCOUNTER (OUTPATIENT)
Dept: INFUSION CENTER | Facility: HOSPITAL | Age: 49
End: 2024-10-15

## 2024-10-17 ENCOUNTER — HOSPITAL ENCOUNTER (OUTPATIENT)
Dept: INFUSION CENTER | Facility: HOSPITAL | Age: 49
Discharge: HOME | End: 2024-10-17
Payer: COMMERCIAL

## 2024-10-17 VITALS
HEART RATE: 87 BPM | SYSTOLIC BLOOD PRESSURE: 117 MMHG | DIASTOLIC BLOOD PRESSURE: 81 MMHG | RESPIRATION RATE: 17 BRPM | TEMPERATURE: 96.9 F

## 2024-10-17 DIAGNOSIS — R11.10 VOMITING AND DIARRHEA: Primary | ICD-10-CM

## 2024-10-17 DIAGNOSIS — E44.1 MILD PROTEIN-CALORIE MALNUTRITION (HCC): ICD-10-CM

## 2024-10-17 DIAGNOSIS — E61.1 IRON DEFICIENCY: ICD-10-CM

## 2024-10-17 DIAGNOSIS — D50.0 IRON DEFICIENCY ANEMIA DUE TO CHRONIC BLOOD LOSS: ICD-10-CM

## 2024-10-17 DIAGNOSIS — R63.0 ANOREXIA: ICD-10-CM

## 2024-10-17 DIAGNOSIS — R19.7 VOMITING AND DIARRHEA: Primary | ICD-10-CM

## 2024-10-17 PROCEDURE — 96365 THER/PROPH/DIAG IV INF INIT: CPT

## 2024-10-17 PROCEDURE — 96367 TX/PROPH/DG ADDL SEQ IV INF: CPT

## 2024-10-17 PROCEDURE — 96366 THER/PROPH/DIAG IV INF ADDON: CPT

## 2024-10-17 PROCEDURE — 96375 TX/PRO/DX INJ NEW DRUG ADDON: CPT

## 2024-10-17 RX ORDER — SODIUM CHLORIDE, SODIUM GLUCONATE, SODIUM ACETATE, POTASSIUM CHLORIDE, MAGNESIUM CHLORIDE, SODIUM PHOSPHATE, DIBASIC, AND POTASSIUM PHOSPHATE .53; .5; .37; .037; .03; .012; .00082 G/100ML; G/100ML; G/100ML; G/100ML; G/100ML; G/100ML; G/100ML
1000 INJECTION, SOLUTION INTRAVENOUS ONCE
Status: COMPLETED | OUTPATIENT
Start: 2024-10-17 | End: 2024-10-17

## 2024-10-17 RX ORDER — SODIUM CHLORIDE, SODIUM GLUCONATE, SODIUM ACETATE, POTASSIUM CHLORIDE, MAGNESIUM CHLORIDE, SODIUM PHOSPHATE, DIBASIC, AND POTASSIUM PHOSPHATE .53; .5; .37; .037; .03; .012; .00082 G/100ML; G/100ML; G/100ML; G/100ML; G/100ML; G/100ML; G/100ML
1000 INJECTION, SOLUTION INTRAVENOUS ONCE
Status: CANCELLED
Start: 2024-10-17 | End: 2024-10-18

## 2024-10-17 RX ORDER — METHYLPREDNISOLONE SODIUM SUCCINATE 40 MG/ML
40 INJECTION, POWDER, LYOPHILIZED, FOR SOLUTION INTRAMUSCULAR; INTRAVENOUS ONCE
Status: COMPLETED | OUTPATIENT
Start: 2024-10-17 | End: 2024-10-17

## 2024-10-17 RX ORDER — METHYLPREDNISOLONE SODIUM SUCCINATE 40 MG/ML
40 INJECTION, POWDER, LYOPHILIZED, FOR SOLUTION INTRAMUSCULAR; INTRAVENOUS ONCE
Status: CANCELLED
Start: 2024-10-17 | End: 2024-10-18

## 2024-10-17 RX ADMIN — FAMOTIDINE 40 MG: 10 INJECTION, SOLUTION INTRAVENOUS at 10:16

## 2024-10-17 RX ADMIN — METHYLPREDNISOLONE SODIUM SUCCINATE 40 MG: 40 INJECTION, POWDER, FOR SOLUTION INTRAMUSCULAR; INTRAVENOUS at 10:43

## 2024-10-17 RX ADMIN — DIPHENHYDRAMINE HYDROCHLORIDE 50 MG: 50 INJECTION, SOLUTION INTRAMUSCULAR; INTRAVENOUS at 09:43

## 2024-10-17 RX ADMIN — SODIUM CHLORIDE, SODIUM GLUCONATE, SODIUM ACETATE, POTASSIUM CHLORIDE, MAGNESIUM CHLORIDE, SODIUM PHOSPHATE, DIBASIC, AND POTASSIUM PHOSPHATE 1000 ML: .53; .5; .37; .037; .03; .012; .00082 INJECTION, SOLUTION INTRAVENOUS at 10:47

## 2024-10-17 NOTE — PROGRESS NOTES
Celine Roa tolerated IV hydration well with no complications.      Celine Roa is aware of future appt on 10/24/24 at 1000    AVS declined.

## 2024-10-18 ENCOUNTER — HOSPITAL ENCOUNTER (OUTPATIENT)
Dept: INFUSION CENTER | Facility: HOSPITAL | Age: 49
End: 2024-10-18
Attending: INTERNAL MEDICINE
Payer: COMMERCIAL

## 2024-10-18 ENCOUNTER — TELEPHONE (OUTPATIENT)
Dept: PSYCHIATRY | Facility: CLINIC | Age: 49
End: 2024-10-18

## 2024-10-18 VITALS
DIASTOLIC BLOOD PRESSURE: 77 MMHG | OXYGEN SATURATION: 99 % | RESPIRATION RATE: 16 BRPM | TEMPERATURE: 96.9 F | HEART RATE: 82 BPM | SYSTOLIC BLOOD PRESSURE: 123 MMHG

## 2024-10-18 DIAGNOSIS — R11.10 VOMITING AND DIARRHEA: ICD-10-CM

## 2024-10-18 DIAGNOSIS — R19.7 VOMITING AND DIARRHEA: ICD-10-CM

## 2024-10-18 DIAGNOSIS — R63.0 ANOREXIA: ICD-10-CM

## 2024-10-18 DIAGNOSIS — E44.1 MILD PROTEIN-CALORIE MALNUTRITION (HCC): ICD-10-CM

## 2024-10-18 DIAGNOSIS — E61.1 IRON DEFICIENCY: ICD-10-CM

## 2024-10-18 DIAGNOSIS — D50.0 IRON DEFICIENCY ANEMIA DUE TO CHRONIC BLOOD LOSS: Primary | ICD-10-CM

## 2024-10-18 PROCEDURE — 96367 TX/PROPH/DG ADDL SEQ IV INF: CPT

## 2024-10-18 PROCEDURE — 96375 TX/PRO/DX INJ NEW DRUG ADDON: CPT

## 2024-10-18 PROCEDURE — 96366 THER/PROPH/DIAG IV INF ADDON: CPT

## 2024-10-18 PROCEDURE — 96365 THER/PROPH/DIAG IV INF INIT: CPT

## 2024-10-18 RX ORDER — SODIUM CHLORIDE, SODIUM GLUCONATE, SODIUM ACETATE, POTASSIUM CHLORIDE, MAGNESIUM CHLORIDE, SODIUM PHOSPHATE, DIBASIC, AND POTASSIUM PHOSPHATE .53; .5; .37; .037; .03; .012; .00082 G/100ML; G/100ML; G/100ML; G/100ML; G/100ML; G/100ML; G/100ML
1000 INJECTION, SOLUTION INTRAVENOUS ONCE
Status: CANCELLED
Start: 2024-10-29 | End: 2024-10-19

## 2024-10-18 RX ORDER — METHYLPREDNISOLONE SODIUM SUCCINATE 40 MG/ML
40 INJECTION, POWDER, LYOPHILIZED, FOR SOLUTION INTRAMUSCULAR; INTRAVENOUS ONCE
Status: CANCELLED
Start: 2024-10-29 | End: 2024-10-19

## 2024-10-18 RX ORDER — SODIUM CHLORIDE, SODIUM GLUCONATE, SODIUM ACETATE, POTASSIUM CHLORIDE, MAGNESIUM CHLORIDE, SODIUM PHOSPHATE, DIBASIC, AND POTASSIUM PHOSPHATE .53; .5; .37; .037; .03; .012; .00082 G/100ML; G/100ML; G/100ML; G/100ML; G/100ML; G/100ML; G/100ML
1000 INJECTION, SOLUTION INTRAVENOUS ONCE
Status: COMPLETED | OUTPATIENT
Start: 2024-10-18 | End: 2024-10-18

## 2024-10-18 RX ORDER — METHYLPREDNISOLONE SODIUM SUCCINATE 40 MG/ML
40 INJECTION, POWDER, LYOPHILIZED, FOR SOLUTION INTRAMUSCULAR; INTRAVENOUS ONCE
Status: COMPLETED | OUTPATIENT
Start: 2024-10-18 | End: 2024-10-18

## 2024-10-18 RX ADMIN — FAMOTIDINE 40 MG: 10 INJECTION, SOLUTION INTRAVENOUS at 12:34

## 2024-10-18 RX ADMIN — DIPHENHYDRAMINE HYDROCHLORIDE 50 MG: 50 INJECTION, SOLUTION INTRAMUSCULAR; INTRAVENOUS at 12:09

## 2024-10-18 RX ADMIN — METHYLPREDNISOLONE SODIUM SUCCINATE 40 MG: 40 INJECTION, POWDER, FOR SOLUTION INTRAMUSCULAR; INTRAVENOUS at 13:01

## 2024-10-18 RX ADMIN — SODIUM CHLORIDE, SODIUM GLUCONATE, SODIUM ACETATE, POTASSIUM CHLORIDE, MAGNESIUM CHLORIDE, SODIUM PHOSPHATE, DIBASIC, AND POTASSIUM PHOSPHATE 1000 ML: .53; .5; .37; .037; .03; .012; .00082 INJECTION, SOLUTION INTRAVENOUS at 13:05

## 2024-10-18 NOTE — PLAN OF CARE
Problem: Knowledge Deficit  Goal: Patient/family/caregiver demonstrates understanding of disease process, treatment plan, medications, and discharge instructions  Description: Complete learning assessment and assess knowledge base.  Interventions:  - Provide teaching at level of understanding  - Provide teaching via preferred learning methods  Outcome: Progressing      show

## 2024-10-18 NOTE — TELEPHONE ENCOUNTER
Writer contacted patient to schedule MELISSA appt to a closer office to home.  Patient scheduled with provider at [place] office on 11/04@ 9 am    Request for MELISSA summary to be completed

## 2024-10-18 NOTE — PROGRESS NOTES
Celine Roa  tolerated hydration with plasmalyte, pepcid, benadryl and steroids well with no complications.      Celine Roa is aware of future appt on Tuesday at [employer] printed and given to Celine Roa:  No (Declined by Celine Roa)

## 2024-10-24 ENCOUNTER — HOSPITAL ENCOUNTER (OUTPATIENT)
Dept: INFUSION CENTER | Facility: HOSPITAL | Age: 49
End: 2024-10-24

## 2024-10-29 ENCOUNTER — HOSPITAL ENCOUNTER (OUTPATIENT)
Dept: INFUSION CENTER | Facility: HOSPITAL | Age: 49
Discharge: HOME | End: 2024-10-29
Payer: COMMERCIAL

## 2024-10-29 VITALS
OXYGEN SATURATION: 99 % | TEMPERATURE: 97.6 F | RESPIRATION RATE: 16 BRPM | DIASTOLIC BLOOD PRESSURE: 91 MMHG | HEART RATE: 90 BPM | SYSTOLIC BLOOD PRESSURE: 127 MMHG

## 2024-10-29 DIAGNOSIS — R63.0 ANOREXIA: ICD-10-CM

## 2024-10-29 DIAGNOSIS — D50.0 IRON DEFICIENCY ANEMIA DUE TO CHRONIC BLOOD LOSS: ICD-10-CM

## 2024-10-29 DIAGNOSIS — L50.1 CHRONIC IDIOPATHIC URTICARIA: ICD-10-CM

## 2024-10-29 DIAGNOSIS — E61.1 IRON DEFICIENCY: ICD-10-CM

## 2024-10-29 DIAGNOSIS — R19.7 VOMITING AND DIARRHEA: Primary | ICD-10-CM

## 2024-10-29 DIAGNOSIS — R11.10 VOMITING AND DIARRHEA: Primary | ICD-10-CM

## 2024-10-29 DIAGNOSIS — E44.1 MILD PROTEIN-CALORIE MALNUTRITION (HCC): ICD-10-CM

## 2024-10-29 RX ORDER — EPINEPHRINE 1 MG/ML
0.3 INJECTION, SOLUTION, CONCENTRATE INTRAVENOUS
OUTPATIENT
Start: 2024-11-01

## 2024-10-29 RX ORDER — METHYLPREDNISOLONE SODIUM SUCCINATE 40 MG/ML
40 INJECTION, POWDER, LYOPHILIZED, FOR SOLUTION INTRAMUSCULAR; INTRAVENOUS ONCE
Status: CANCELLED
Start: 2024-10-31 | End: 2024-10-30

## 2024-10-29 RX ORDER — SODIUM CHLORIDE, SODIUM GLUCONATE, SODIUM ACETATE, POTASSIUM CHLORIDE, MAGNESIUM CHLORIDE, SODIUM PHOSPHATE, DIBASIC, AND POTASSIUM PHOSPHATE .53; .5; .37; .037; .03; .012; .00082 G/100ML; G/100ML; G/100ML; G/100ML; G/100ML; G/100ML; G/100ML
1000 INJECTION, SOLUTION INTRAVENOUS ONCE
Status: CANCELLED
Start: 2024-10-31 | End: 2024-10-30

## 2024-10-29 RX ORDER — EPINEPHRINE 1 MG/ML
0.3 INJECTION, SOLUTION, CONCENTRATE INTRAVENOUS
Status: DISCONTINUED | OUTPATIENT
Start: 2024-10-29 | End: 2024-11-01 | Stop reason: HOSPADM

## 2024-10-29 RX ORDER — METHYLPREDNISOLONE SODIUM SUCCINATE 40 MG/ML
40 INJECTION, POWDER, LYOPHILIZED, FOR SOLUTION INTRAMUSCULAR; INTRAVENOUS ONCE
Status: COMPLETED | OUTPATIENT
Start: 2024-10-29 | End: 2024-10-29

## 2024-10-29 RX ORDER — SODIUM CHLORIDE, SODIUM GLUCONATE, SODIUM ACETATE, POTASSIUM CHLORIDE, MAGNESIUM CHLORIDE, SODIUM PHOSPHATE, DIBASIC, AND POTASSIUM PHOSPHATE .53; .5; .37; .037; .03; .012; .00082 G/100ML; G/100ML; G/100ML; G/100ML; G/100ML; G/100ML; G/100ML
1000 INJECTION, SOLUTION INTRAVENOUS ONCE
Status: COMPLETED | OUTPATIENT
Start: 2024-10-29 | End: 2024-10-29

## 2024-10-29 RX ADMIN — FAMOTIDINE 40 MG: 10 INJECTION, SOLUTION INTRAVENOUS at 10:12

## 2024-10-29 RX ADMIN — OMALIZUMAB 300 MG: 150 INJECTION, SOLUTION SUBCUTANEOUS at 12:57

## 2024-10-29 RX ADMIN — METHYLPREDNISOLONE SODIUM SUCCINATE 40 MG: 40 INJECTION, POWDER, FOR SOLUTION INTRAMUSCULAR; INTRAVENOUS at 10:46

## 2024-10-29 RX ADMIN — DIPHENHYDRAMINE HYDROCHLORIDE 50 MG: 50 INJECTION, SOLUTION INTRAMUSCULAR; INTRAVENOUS at 09:34

## 2024-10-29 RX ADMIN — SODIUM CHLORIDE, SODIUM GLUCONATE, SODIUM ACETATE, POTASSIUM CHLORIDE, MAGNESIUM CHLORIDE, SODIUM PHOSPHATE, DIBASIC, AND POTASSIUM PHOSPHATE 1000 ML: .53; .5; .37; .037; .03; .012; .00082 INJECTION, SOLUTION INTRAVENOUS at 10:49

## 2024-10-29 NOTE — PROGRESS NOTES
Celine Roa tolerated IV hydration and subq Xolair well with no complications.  Patient's epipen present with expiration 02/25.     Celine Roa is aware of future appt on 10/31/24 at 0900.     AVS declined.

## 2024-10-31 DIAGNOSIS — K51.019 ULCERATIVE PANCOLITIS WITH COMPLICATION (HCC): Primary | ICD-10-CM

## 2024-10-31 RX ORDER — UPADACITINIB 45 MG/1
45 TABLET, EXTENDED RELEASE ORAL DAILY
Qty: 30 TABLET | Refills: 3 | Status: SHIPPED | OUTPATIENT
Start: 2024-10-31

## 2024-11-01 ENCOUNTER — HOSPITAL ENCOUNTER (OUTPATIENT)
Dept: INFUSION CENTER | Facility: HOSPITAL | Age: 49
End: 2024-11-01
Attending: INTERNAL MEDICINE
Payer: COMMERCIAL

## 2024-11-01 VITALS
SYSTOLIC BLOOD PRESSURE: 115 MMHG | OXYGEN SATURATION: 97 % | DIASTOLIC BLOOD PRESSURE: 81 MMHG | RESPIRATION RATE: 16 BRPM | TEMPERATURE: 97.8 F | HEART RATE: 109 BPM

## 2024-11-01 DIAGNOSIS — R63.0 ANOREXIA: ICD-10-CM

## 2024-11-01 DIAGNOSIS — E61.1 IRON DEFICIENCY: ICD-10-CM

## 2024-11-01 DIAGNOSIS — R11.10 VOMITING AND DIARRHEA: ICD-10-CM

## 2024-11-01 DIAGNOSIS — E44.1 MILD PROTEIN-CALORIE MALNUTRITION (HCC): ICD-10-CM

## 2024-11-01 DIAGNOSIS — D50.0 IRON DEFICIENCY ANEMIA DUE TO CHRONIC BLOOD LOSS: Primary | ICD-10-CM

## 2024-11-01 DIAGNOSIS — R19.7 VOMITING AND DIARRHEA: ICD-10-CM

## 2024-11-01 PROCEDURE — 96365 THER/PROPH/DIAG IV INF INIT: CPT

## 2024-11-01 PROCEDURE — 96375 TX/PRO/DX INJ NEW DRUG ADDON: CPT

## 2024-11-01 PROCEDURE — 96367 TX/PROPH/DG ADDL SEQ IV INF: CPT

## 2024-11-01 PROCEDURE — 96366 THER/PROPH/DIAG IV INF ADDON: CPT

## 2024-11-01 RX ORDER — SODIUM CHLORIDE, SODIUM GLUCONATE, SODIUM ACETATE, POTASSIUM CHLORIDE, MAGNESIUM CHLORIDE, SODIUM PHOSPHATE, DIBASIC, AND POTASSIUM PHOSPHATE .53; .5; .37; .037; .03; .012; .00082 G/100ML; G/100ML; G/100ML; G/100ML; G/100ML; G/100ML; G/100ML
1000 INJECTION, SOLUTION INTRAVENOUS ONCE
Status: CANCELLED
Start: 2024-11-02 | End: 2024-11-02

## 2024-11-01 RX ORDER — SODIUM CHLORIDE, SODIUM GLUCONATE, SODIUM ACETATE, POTASSIUM CHLORIDE, MAGNESIUM CHLORIDE, SODIUM PHOSPHATE, DIBASIC, AND POTASSIUM PHOSPHATE .53; .5; .37; .037; .03; .012; .00082 G/100ML; G/100ML; G/100ML; G/100ML; G/100ML; G/100ML; G/100ML
1000 INJECTION, SOLUTION INTRAVENOUS ONCE
Status: COMPLETED | OUTPATIENT
Start: 2024-11-01 | End: 2024-11-01

## 2024-11-01 RX ORDER — METHYLPREDNISOLONE SODIUM SUCCINATE 40 MG/ML
40 INJECTION, POWDER, LYOPHILIZED, FOR SOLUTION INTRAMUSCULAR; INTRAVENOUS ONCE
Status: CANCELLED
Start: 2024-11-02 | End: 2024-11-02

## 2024-11-01 RX ORDER — METHYLPREDNISOLONE SODIUM SUCCINATE 40 MG/ML
40 INJECTION, POWDER, LYOPHILIZED, FOR SOLUTION INTRAMUSCULAR; INTRAVENOUS ONCE
Status: COMPLETED | OUTPATIENT
Start: 2024-11-01 | End: 2024-11-01

## 2024-11-01 RX ADMIN — DIPHENHYDRAMINE HYDROCHLORIDE 50 MG: 50 INJECTION, SOLUTION INTRAMUSCULAR; INTRAVENOUS at 11:44

## 2024-11-01 RX ADMIN — METHYLPREDNISOLONE SODIUM SUCCINATE 40 MG: 40 INJECTION, POWDER, FOR SOLUTION INTRAMUSCULAR; INTRAVENOUS at 12:34

## 2024-11-01 RX ADMIN — FAMOTIDINE 40 MG: 10 INJECTION, SOLUTION INTRAVENOUS at 12:06

## 2024-11-01 RX ADMIN — SODIUM CHLORIDE, SODIUM GLUCONATE, SODIUM ACETATE, POTASSIUM CHLORIDE, MAGNESIUM CHLORIDE, SODIUM PHOSPHATE, DIBASIC, AND POTASSIUM PHOSPHATE 1000 ML: .53; .5; .37; .037; .03; .012; .00082 INJECTION, SOLUTION INTRAVENOUS at 12:35

## 2024-11-01 NOTE — PROGRESS NOTES
Celine Roa  tolerated hydration and Pre-meds well with no complications.      Celine Roa is aware of future appt on 11-5-24 at 1130    AVS printed and given to Celine Roa:  Y

## 2024-11-01 NOTE — PROGRESS NOTES
Celine Roa  tolerated hydration and premeds well with no complications.      Celine Roa is aware of future appt on 11/7 at 9AM.     AVS printed and given to Celine Roa: No (Declined by Celine Roa).

## 2024-11-05 ENCOUNTER — HOSPITAL ENCOUNTER (OUTPATIENT)
Dept: INFUSION CENTER | Facility: HOSPITAL | Age: 49
Discharge: HOME | End: 2024-11-05
Payer: COMMERCIAL

## 2024-11-05 VITALS
OXYGEN SATURATION: 96 % | SYSTOLIC BLOOD PRESSURE: 119 MMHG | DIASTOLIC BLOOD PRESSURE: 82 MMHG | RESPIRATION RATE: 16 BRPM | TEMPERATURE: 97.6 F | HEART RATE: 94 BPM

## 2024-11-05 DIAGNOSIS — E61.1 IRON DEFICIENCY: ICD-10-CM

## 2024-11-05 DIAGNOSIS — R19.7 VOMITING AND DIARRHEA: Primary | ICD-10-CM

## 2024-11-05 DIAGNOSIS — E44.1 MILD PROTEIN-CALORIE MALNUTRITION (HCC): ICD-10-CM

## 2024-11-05 DIAGNOSIS — R63.0 ANOREXIA: ICD-10-CM

## 2024-11-05 DIAGNOSIS — R11.10 VOMITING AND DIARRHEA: Primary | ICD-10-CM

## 2024-11-05 DIAGNOSIS — D50.0 IRON DEFICIENCY ANEMIA DUE TO CHRONIC BLOOD LOSS: ICD-10-CM

## 2024-11-05 PROCEDURE — 96367 TX/PROPH/DG ADDL SEQ IV INF: CPT

## 2024-11-05 PROCEDURE — 96375 TX/PRO/DX INJ NEW DRUG ADDON: CPT

## 2024-11-05 PROCEDURE — 96365 THER/PROPH/DIAG IV INF INIT: CPT

## 2024-11-05 RX ORDER — METHYLPREDNISOLONE SODIUM SUCCINATE 40 MG/ML
40 INJECTION, POWDER, LYOPHILIZED, FOR SOLUTION INTRAMUSCULAR; INTRAVENOUS ONCE
Status: COMPLETED | OUTPATIENT
Start: 2024-11-05 | End: 2024-11-05

## 2024-11-05 RX ORDER — METHYLPREDNISOLONE SODIUM SUCCINATE 40 MG/ML
40 INJECTION, POWDER, LYOPHILIZED, FOR SOLUTION INTRAMUSCULAR; INTRAVENOUS ONCE
Status: CANCELLED
Start: 2024-11-12 | End: 2024-11-06

## 2024-11-05 RX ORDER — SODIUM CHLORIDE, SODIUM GLUCONATE, SODIUM ACETATE, POTASSIUM CHLORIDE, MAGNESIUM CHLORIDE, SODIUM PHOSPHATE, DIBASIC, AND POTASSIUM PHOSPHATE .53; .5; .37; .037; .03; .012; .00082 G/100ML; G/100ML; G/100ML; G/100ML; G/100ML; G/100ML; G/100ML
1000 INJECTION, SOLUTION INTRAVENOUS ONCE
Status: COMPLETED | OUTPATIENT
Start: 2024-11-05 | End: 2024-11-05

## 2024-11-05 RX ORDER — SODIUM CHLORIDE, SODIUM GLUCONATE, SODIUM ACETATE, POTASSIUM CHLORIDE, MAGNESIUM CHLORIDE, SODIUM PHOSPHATE, DIBASIC, AND POTASSIUM PHOSPHATE .53; .5; .37; .037; .03; .012; .00082 G/100ML; G/100ML; G/100ML; G/100ML; G/100ML; G/100ML; G/100ML
1000 INJECTION, SOLUTION INTRAVENOUS ONCE
Status: CANCELLED
Start: 2024-11-12 | End: 2024-11-06

## 2024-11-05 RX ADMIN — DIPHENHYDRAMINE HYDROCHLORIDE 50 MG: 50 INJECTION, SOLUTION INTRAMUSCULAR; INTRAVENOUS at 11:56

## 2024-11-05 RX ADMIN — FAMOTIDINE 40 MG: 10 INJECTION, SOLUTION INTRAVENOUS at 12:25

## 2024-11-05 RX ADMIN — METHYLPREDNISOLONE SODIUM SUCCINATE 40 MG: 40 INJECTION, POWDER, FOR SOLUTION INTRAMUSCULAR; INTRAVENOUS at 12:52

## 2024-11-05 RX ADMIN — SODIUM CHLORIDE, SODIUM GLUCONATE, SODIUM ACETATE, POTASSIUM CHLORIDE, MAGNESIUM CHLORIDE, SODIUM PHOSPHATE, DIBASIC, AND POTASSIUM PHOSPHATE 1000 ML: .53; .5; .37; .037; .03; .012; .00082 INJECTION, SOLUTION INTRAVENOUS at 12:55

## 2024-11-05 NOTE — PROGRESS NOTES
Celine Roa tolerated IV hydration well with no complications.      Celine Roa is aware of future appt on 11/12/24 at 0800.     AVS declined.

## 2024-11-07 ENCOUNTER — HOSPITAL ENCOUNTER (OUTPATIENT)
Dept: INFUSION CENTER | Facility: HOSPITAL | Age: 49
End: 2024-11-07

## 2024-11-11 DIAGNOSIS — R63.0 ANOREXIA: Primary | ICD-10-CM

## 2024-11-11 DIAGNOSIS — E44.1 MILD PROTEIN-CALORIE MALNUTRITION (HCC): ICD-10-CM

## 2024-11-11 DIAGNOSIS — E61.1 IRON DEFICIENCY: ICD-10-CM

## 2024-11-11 DIAGNOSIS — R11.10 VOMITING AND DIARRHEA: ICD-10-CM

## 2024-11-11 DIAGNOSIS — R19.7 VOMITING AND DIARRHEA: ICD-10-CM

## 2024-11-11 DIAGNOSIS — D50.0 IRON DEFICIENCY ANEMIA DUE TO CHRONIC BLOOD LOSS: ICD-10-CM

## 2024-11-11 RX ORDER — METHYLPREDNISOLONE SODIUM SUCCINATE 40 MG/ML
40 INJECTION, POWDER, LYOPHILIZED, FOR SOLUTION INTRAMUSCULAR; INTRAVENOUS ONCE
Status: CANCELLED
Start: 2024-11-12 | End: 2024-11-12

## 2024-11-11 RX ORDER — SODIUM CHLORIDE, SODIUM GLUCONATE, SODIUM ACETATE, POTASSIUM CHLORIDE, MAGNESIUM CHLORIDE, SODIUM PHOSPHATE, DIBASIC, AND POTASSIUM PHOSPHATE .53; .5; .37; .037; .03; .012; .00082 G/100ML; G/100ML; G/100ML; G/100ML; G/100ML; G/100ML; G/100ML
1000 INJECTION, SOLUTION INTRAVENOUS ONCE
Status: CANCELLED
Start: 2024-11-12 | End: 2024-11-12

## 2024-11-12 ENCOUNTER — HOSPITAL ENCOUNTER (OUTPATIENT)
Dept: INFUSION CENTER | Facility: HOSPITAL | Age: 49
Discharge: HOME | End: 2024-11-12
Payer: COMMERCIAL

## 2024-11-12 VITALS
SYSTOLIC BLOOD PRESSURE: 129 MMHG | TEMPERATURE: 97.7 F | HEART RATE: 84 BPM | RESPIRATION RATE: 16 BRPM | DIASTOLIC BLOOD PRESSURE: 86 MMHG

## 2024-11-12 DIAGNOSIS — R19.7 VOMITING AND DIARRHEA: ICD-10-CM

## 2024-11-12 DIAGNOSIS — D50.0 IRON DEFICIENCY ANEMIA DUE TO CHRONIC BLOOD LOSS: Primary | ICD-10-CM

## 2024-11-12 DIAGNOSIS — E61.1 IRON DEFICIENCY: ICD-10-CM

## 2024-11-12 DIAGNOSIS — R63.0 ANOREXIA: ICD-10-CM

## 2024-11-12 DIAGNOSIS — E44.1 MILD PROTEIN-CALORIE MALNUTRITION (HCC): ICD-10-CM

## 2024-11-12 DIAGNOSIS — R11.10 VOMITING AND DIARRHEA: ICD-10-CM

## 2024-11-12 PROCEDURE — 96366 THER/PROPH/DIAG IV INF ADDON: CPT

## 2024-11-12 PROCEDURE — 96365 THER/PROPH/DIAG IV INF INIT: CPT

## 2024-11-12 PROCEDURE — 96375 TX/PRO/DX INJ NEW DRUG ADDON: CPT

## 2024-11-12 PROCEDURE — 96367 TX/PROPH/DG ADDL SEQ IV INF: CPT

## 2024-11-12 RX ORDER — METHYLPREDNISOLONE SODIUM SUCCINATE 40 MG/ML
40 INJECTION, POWDER, LYOPHILIZED, FOR SOLUTION INTRAMUSCULAR; INTRAVENOUS ONCE
Status: CANCELLED
Start: 2024-11-19 | End: 2024-11-13

## 2024-11-12 RX ORDER — SODIUM CHLORIDE, SODIUM GLUCONATE, SODIUM ACETATE, POTASSIUM CHLORIDE, MAGNESIUM CHLORIDE, SODIUM PHOSPHATE, DIBASIC, AND POTASSIUM PHOSPHATE .53; .5; .37; .037; .03; .012; .00082 G/100ML; G/100ML; G/100ML; G/100ML; G/100ML; G/100ML; G/100ML
1000 INJECTION, SOLUTION INTRAVENOUS ONCE
Status: COMPLETED | OUTPATIENT
Start: 2024-11-12 | End: 2024-11-12

## 2024-11-12 RX ORDER — SODIUM CHLORIDE, SODIUM GLUCONATE, SODIUM ACETATE, POTASSIUM CHLORIDE, MAGNESIUM CHLORIDE, SODIUM PHOSPHATE, DIBASIC, AND POTASSIUM PHOSPHATE .53; .5; .37; .037; .03; .012; .00082 G/100ML; G/100ML; G/100ML; G/100ML; G/100ML; G/100ML; G/100ML
1000 INJECTION, SOLUTION INTRAVENOUS ONCE
Status: CANCELLED
Start: 2024-11-19 | End: 2024-11-13

## 2024-11-12 RX ORDER — METHYLPREDNISOLONE SODIUM SUCCINATE 40 MG/ML
40 INJECTION, POWDER, LYOPHILIZED, FOR SOLUTION INTRAMUSCULAR; INTRAVENOUS ONCE
Status: COMPLETED | OUTPATIENT
Start: 2024-11-12 | End: 2024-11-12

## 2024-11-12 RX ADMIN — FAMOTIDINE 40 MG: 10 INJECTION, SOLUTION INTRAVENOUS at 09:03

## 2024-11-12 RX ADMIN — METHYLPREDNISOLONE SODIUM SUCCINATE 40 MG: 40 INJECTION, POWDER, FOR SOLUTION INTRAMUSCULAR; INTRAVENOUS at 09:37

## 2024-11-12 RX ADMIN — DIPHENHYDRAMINE HYDROCHLORIDE 50 MG: 50 INJECTION, SOLUTION INTRAMUSCULAR; INTRAVENOUS at 08:30

## 2024-11-12 RX ADMIN — SODIUM CHLORIDE, SODIUM GLUCONATE, SODIUM ACETATE, POTASSIUM CHLORIDE, MAGNESIUM CHLORIDE, SODIUM PHOSPHATE, DIBASIC, AND POTASSIUM PHOSPHATE 1000 ML: .53; .5; .37; .037; .03; .012; .00082 INJECTION, SOLUTION INTRAVENOUS at 09:43

## 2024-11-12 NOTE — PLAN OF CARE
Problem: Potential for Falls  Goal: Patient will remain free of falls  Description: INTERVENTIONS:  - Educate patient/family on patient safety including physical limitations  - Instruct patient to call for assistance with activity   - Consult OT/PT to assist with strengthening/mobility   - Keep Call bell within reach  - Keep bed low and locked with side rails adjusted as appropriate  - Keep care items and personal belongings within reach  - Initiate and maintain comfort rounds  - Make Fall Risk Sign visible to staff  - Consider moving patient to room near nurses station  Outcome: Progressing     Problem: INFECTION - ADULT  Goal: Absence or prevention of progression during hospitalization  Description: INTERVENTIONS:  - Assess and monitor for signs and symptoms of infection  - Monitor lab/diagnostic results  - Monitor all insertion sites, i.e. indwelling lines, tubes, and drains  - Monitor endotracheal if appropriate and nasal secretions for changes in amount and color  - [place] appropriate cooling/warming therapies per order  - Administer medications as ordered  - Instruct and encourage patient and family to use good hand hygiene technique  - Identify and instruct in appropriate isolation precautions for identified infection/condition  Outcome: Progressing     Problem: Knowledge Deficit  Goal: Patient/family/caregiver demonstrates understanding of disease process, treatment plan, medications, and discharge instructions  Description: Complete learning assessment and assess knowledge base.  Interventions:  - Provide teaching at level of understanding  - Provide teaching via preferred learning methods  Outcome: Progressing

## 2024-11-12 NOTE — PROGRESS NOTES
Pt tolerated hydration and pre-meds well without any complications. PIV removed without incident.     Celine Roa is aware of future appt on 11/19/24 at 10:30AM.     AVS declined by Celine Roa.    Pt discharged off unit in stable condition with all personal belongings.

## 2024-11-18 ENCOUNTER — OFFICE VISIT (OUTPATIENT)
Dept: FAMILY MEDICINE CLINIC | Facility: HOME HEALTHCARE | Age: 49
End: 2024-11-18
Payer: COMMERCIAL

## 2024-11-18 VITALS
HEART RATE: 78 BPM | RESPIRATION RATE: 18 BRPM | BODY MASS INDEX: 31.64 KG/M2 | OXYGEN SATURATION: 99 % | SYSTOLIC BLOOD PRESSURE: 112 MMHG | DIASTOLIC BLOOD PRESSURE: 84 MMHG | TEMPERATURE: 98 F | WEIGHT: 162 LBS

## 2024-11-18 DIAGNOSIS — E06.3 HASHIMOTO'S DISEASE: ICD-10-CM

## 2024-11-18 DIAGNOSIS — D89.40 MAST CELL ACTIVATION SYNDROME (HCC): ICD-10-CM

## 2024-11-18 DIAGNOSIS — D83.9 COMMON VARIABLE IMMUNODEFICIENCY (HCC): ICD-10-CM

## 2024-11-18 DIAGNOSIS — K52.9 INFLAMMATORY BOWEL DISEASE: ICD-10-CM

## 2024-11-18 DIAGNOSIS — S93.402A SPRAIN OF LEFT ANKLE, UNSPECIFIED LIGAMENT, INITIAL ENCOUNTER: ICD-10-CM

## 2024-11-18 DIAGNOSIS — D89.44 HEREDITARY ALPHA TRYPTASEMIA (HCC): Primary | ICD-10-CM

## 2024-11-18 PROCEDURE — 99214 OFFICE O/P EST MOD 30 MIN: CPT | Performed by: FAMILY MEDICINE
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
## 2024-11-19 ENCOUNTER — HOSPITAL ENCOUNTER (OUTPATIENT)
Dept: INFUSION CENTER | Facility: HOSPITAL | Age: 49
Discharge: HOME | End: 2024-11-19
Payer: COMMERCIAL

## 2024-11-19 VITALS
DIASTOLIC BLOOD PRESSURE: 85 MMHG | RESPIRATION RATE: 16 BRPM | HEART RATE: 108 BPM | OXYGEN SATURATION: 98 % | TEMPERATURE: 97.8 F | SYSTOLIC BLOOD PRESSURE: 125 MMHG

## 2024-11-19 DIAGNOSIS — R11.10 VOMITING AND DIARRHEA: Primary | ICD-10-CM

## 2024-11-19 DIAGNOSIS — E61.1 IRON DEFICIENCY: ICD-10-CM

## 2024-11-19 DIAGNOSIS — D50.0 IRON DEFICIENCY ANEMIA DUE TO CHRONIC BLOOD LOSS: ICD-10-CM

## 2024-11-19 DIAGNOSIS — R19.7 VOMITING AND DIARRHEA: Primary | ICD-10-CM

## 2024-11-19 DIAGNOSIS — R63.0 ANOREXIA: ICD-10-CM

## 2024-11-19 DIAGNOSIS — E44.1 MILD PROTEIN-CALORIE MALNUTRITION (HCC): ICD-10-CM

## 2024-11-19 RX ORDER — METHYLPREDNISOLONE SODIUM SUCCINATE 40 MG/ML
40 INJECTION, POWDER, LYOPHILIZED, FOR SOLUTION INTRAMUSCULAR; INTRAVENOUS ONCE
Status: CANCELLED
Start: 2024-11-21 | End: 2024-11-20

## 2024-11-19 RX ORDER — METHYLPREDNISOLONE SODIUM SUCCINATE 40 MG/ML
40 INJECTION, POWDER, LYOPHILIZED, FOR SOLUTION INTRAMUSCULAR; INTRAVENOUS ONCE
Status: COMPLETED | OUTPATIENT
Start: 2024-11-19 | End: 2024-11-19

## 2024-11-19 RX ORDER — SODIUM CHLORIDE, SODIUM GLUCONATE, SODIUM ACETATE, POTASSIUM CHLORIDE, MAGNESIUM CHLORIDE, SODIUM PHOSPHATE, DIBASIC, AND POTASSIUM PHOSPHATE .53; .5; .37; .037; .03; .012; .00082 G/100ML; G/100ML; G/100ML; G/100ML; G/100ML; G/100ML; G/100ML
1000 INJECTION, SOLUTION INTRAVENOUS ONCE
Status: CANCELLED
Start: 2024-11-21 | End: 2024-11-20

## 2024-11-19 RX ORDER — SODIUM CHLORIDE, SODIUM GLUCONATE, SODIUM ACETATE, POTASSIUM CHLORIDE, MAGNESIUM CHLORIDE, SODIUM PHOSPHATE, DIBASIC, AND POTASSIUM PHOSPHATE .53; .5; .37; .037; .03; .012; .00082 G/100ML; G/100ML; G/100ML; G/100ML; G/100ML; G/100ML; G/100ML
1000 INJECTION, SOLUTION INTRAVENOUS ONCE
Status: COMPLETED | OUTPATIENT
Start: 2024-11-19 | End: 2024-11-19

## 2024-11-19 RX ADMIN — DIPHENHYDRAMINE HYDROCHLORIDE 50 MG: 50 INJECTION, SOLUTION INTRAMUSCULAR; INTRAVENOUS at 11:04

## 2024-11-19 RX ADMIN — METHYLPREDNISOLONE SODIUM SUCCINATE 40 MG: 40 INJECTION, POWDER, FOR SOLUTION INTRAMUSCULAR; INTRAVENOUS at 12:28

## 2024-11-19 RX ADMIN — FAMOTIDINE 40 MG: 10 INJECTION, SOLUTION INTRAVENOUS at 11:27

## 2024-11-19 RX ADMIN — SODIUM CHLORIDE, SODIUM GLUCONATE, SODIUM ACETATE, POTASSIUM CHLORIDE, MAGNESIUM CHLORIDE, SODIUM PHOSPHATE, DIBASIC, AND POTASSIUM PHOSPHATE 1000 ML: .53; .5; .37; .037; .03; .012; .00082 INJECTION, SOLUTION INTRAVENOUS at 12:34

## 2024-11-19 NOTE — PLAN OF CARE
Problem: Potential for Falls  Goal: Patient will remain free of falls  Description: INTERVENTIONS:  - Educate patient/family on patient safety including physical limitations  - Instruct patient to call for assistance with activity   - Consult OT/PT to assist with strengthening/mobility   - Keep Call bell within reach  - Keep bed low and locked with side rails adjusted as appropriate  - Keep care items and personal belongings within reach  - Initiate and maintain comfort rounds  - Make Fall Risk Sign visible to staff  - Consider moving patient to room near nurses station  11/19/2024 1108 by Ashley Mejias RN  Outcome: Progressing  11/19/2024 1108 by Ashley Mejias, RN  Outcome: Progressing     Problem: INFECTION - ADULT  Goal: Absence or prevention of progression during hospitalization  Description: INTERVENTIONS:  - Assess and monitor for signs and symptoms of infection  - Monitor lab/diagnostic results  - Monitor all insertion sites, i.e. indwelling lines, tubes, and drains  - Monitor endotracheal if appropriate and nasal secretions for changes in amount and color  - [place] appropriate cooling/warming therapies per order  - Administer medications as ordered  - Instruct and encourage patient and family to use good hand hygiene technique  - Identify and instruct in appropriate isolation precautions for identified infection/condition  Outcome: Progressing     Problem: Knowledge Deficit  Goal: Patient/family/caregiver demonstrates understanding of disease process, treatment plan, medications, and discharge instructions  Description: Complete learning assessment and assess knowledge base.  Interventions:  - Provide teaching at level of understanding  - Provide teaching via preferred learning methods  Outcome: Progressing

## 2024-11-19 NOTE — PROGRESS NOTES
Pt tolerated hydration and pre-meds well without any complications. PIV removed without incident.      Celine Roa is aware of future appt on 11/21/24 at 10:30AM.      AVS declined by Celine Roa.     Pt discharged off unit in stable condition with all personal belongings.

## 2024-11-21 ENCOUNTER — HOSPITAL ENCOUNTER (OUTPATIENT)
Dept: INFUSION CENTER | Facility: HOSPITAL | Age: 49
Discharge: HOME | End: 2024-11-21
Payer: COMMERCIAL

## 2024-11-21 VITALS
SYSTOLIC BLOOD PRESSURE: 125 MMHG | DIASTOLIC BLOOD PRESSURE: 77 MMHG | HEART RATE: 107 BPM | RESPIRATION RATE: 16 BRPM | OXYGEN SATURATION: 98 % | TEMPERATURE: 97.9 F

## 2024-11-21 DIAGNOSIS — E61.1 IRON DEFICIENCY: ICD-10-CM

## 2024-11-21 DIAGNOSIS — R63.0 ANOREXIA: ICD-10-CM

## 2024-11-21 DIAGNOSIS — E44.1 MILD PROTEIN-CALORIE MALNUTRITION (HCC): ICD-10-CM

## 2024-11-21 DIAGNOSIS — R19.7 VOMITING AND DIARRHEA: Primary | ICD-10-CM

## 2024-11-21 DIAGNOSIS — D50.0 IRON DEFICIENCY ANEMIA DUE TO CHRONIC BLOOD LOSS: ICD-10-CM

## 2024-11-21 DIAGNOSIS — R11.10 VOMITING AND DIARRHEA: Primary | ICD-10-CM

## 2024-11-21 PROCEDURE — 96367 TX/PROPH/DG ADDL SEQ IV INF: CPT

## 2024-11-21 PROCEDURE — 96375 TX/PRO/DX INJ NEW DRUG ADDON: CPT

## 2024-11-21 PROCEDURE — 96365 THER/PROPH/DIAG IV INF INIT: CPT

## 2024-11-21 RX ORDER — METHYLPREDNISOLONE SODIUM SUCCINATE 40 MG/ML
40 INJECTION, POWDER, LYOPHILIZED, FOR SOLUTION INTRAMUSCULAR; INTRAVENOUS ONCE
Status: COMPLETED | OUTPATIENT
Start: 2024-11-21 | End: 2024-11-21

## 2024-11-21 RX ORDER — SODIUM CHLORIDE, SODIUM GLUCONATE, SODIUM ACETATE, POTASSIUM CHLORIDE, MAGNESIUM CHLORIDE, SODIUM PHOSPHATE, DIBASIC, AND POTASSIUM PHOSPHATE .53; .5; .37; .037; .03; .012; .00082 G/100ML; G/100ML; G/100ML; G/100ML; G/100ML; G/100ML; G/100ML
1000 INJECTION, SOLUTION INTRAVENOUS ONCE
Status: COMPLETED | OUTPATIENT
Start: 2024-11-21 | End: 2024-11-21

## 2024-11-21 RX ORDER — SODIUM CHLORIDE, SODIUM GLUCONATE, SODIUM ACETATE, POTASSIUM CHLORIDE, MAGNESIUM CHLORIDE, SODIUM PHOSPHATE, DIBASIC, AND POTASSIUM PHOSPHATE .53; .5; .37; .037; .03; .012; .00082 G/100ML; G/100ML; G/100ML; G/100ML; G/100ML; G/100ML; G/100ML
1000 INJECTION, SOLUTION INTRAVENOUS ONCE
Status: CANCELLED
Start: 2024-12-03 | End: 2024-11-22

## 2024-11-21 RX ORDER — METHYLPREDNISOLONE SODIUM SUCCINATE 40 MG/ML
40 INJECTION, POWDER, LYOPHILIZED, FOR SOLUTION INTRAMUSCULAR; INTRAVENOUS ONCE
Status: CANCELLED
Start: 2024-12-03 | End: 2024-11-22

## 2024-11-21 RX ADMIN — METHYLPREDNISOLONE SODIUM SUCCINATE 40 MG: 40 INJECTION, POWDER, FOR SOLUTION INTRAMUSCULAR; INTRAVENOUS at 13:10

## 2024-11-21 RX ADMIN — DIPHENHYDRAMINE HYDROCHLORIDE 50 MG: 50 INJECTION, SOLUTION INTRAMUSCULAR; INTRAVENOUS at 12:02

## 2024-11-21 RX ADMIN — FAMOTIDINE 40 MG: 10 INJECTION, SOLUTION INTRAVENOUS at 12:37

## 2024-11-21 RX ADMIN — SODIUM CHLORIDE, SODIUM GLUCONATE, SODIUM ACETATE, POTASSIUM CHLORIDE, MAGNESIUM CHLORIDE, SODIUM PHOSPHATE, DIBASIC, AND POTASSIUM PHOSPHATE 1000 ML: .53; .5; .37; .037; .03; .012; .00082 INJECTION, SOLUTION INTRAVENOUS at 13:16

## 2024-11-21 NOTE — PROGRESS NOTES
Pt tolerated hydration and pre-meds well without any complications. 1L isolyte fluids stopped early d/t pt needing to go to work. PIV removed without incident.      Celine Roa is aware of future appt on 12/3/24 at 9AM.      AVS declined by Celine Roa.     Pt discharged off unit in stable condition with all personal belongings.

## 2024-11-21 NOTE — PLAN OF CARE
Problem: Potential for Falls  Goal: Patient will remain free of falls  Description: INTERVENTIONS:  - Educate patient/family on patient safety including physical limitations  - Instruct patient to call for assistance with activity   - Consult OT/PT to assist with strengthening/mobility   - Keep Call bell within reach  - Keep bed low and locked with side rails adjusted as appropriate  - Keep care items and personal belongings within reach  - Initiate and maintain comfort rounds  - Make Fall Risk Sign visible to staff  - Consider moving patient to room near nurses station  Outcome: Progressing     Problem: INFECTION - ADULT  Goal: Absence or prevention of progression during hospitalization  Description: INTERVENTIONS:  - Assess and monitor for signs and symptoms of infection  - Monitor lab/diagnostic results  - Monitor all insertion sites, i.e. indwelling lines, tubes, and drains  - Monitor endotracheal if appropriate and nasal secretions for changes in amount and color  - [place] appropriate cooling/warming therapies per order  - Administer medications as ordered  - Instruct and encourage patient and family to use good hand hygiene technique  - Identify and instruct in appropriate isolation precautions for identified infection/condition  Outcome: Progressing     Problem: Knowledge Deficit  Goal: Patient/family/caregiver demonstrates understanding of disease process, treatment plan, medications, and discharge instructions  Description: Complete learning assessment and assess knowledge base.  Interventions:  - Provide teaching at level of understanding  - Provide teaching via preferred learning methods  Outcome: Progressing

## 2024-11-29 ENCOUNTER — TELEPHONE (OUTPATIENT)
Dept: GASTROENTEROLOGY | Facility: CLINIC | Age: 49
End: 2024-11-29

## 2024-12-03 ENCOUNTER — HOSPITAL ENCOUNTER (OUTPATIENT)
Dept: INFUSION CENTER | Facility: HOSPITAL | Age: 49
Discharge: HOME | End: 2024-12-03
Payer: COMMERCIAL

## 2024-12-03 DIAGNOSIS — R63.0 ANOREXIA: ICD-10-CM

## 2024-12-03 DIAGNOSIS — R19.7 VOMITING AND DIARRHEA: ICD-10-CM

## 2024-12-03 DIAGNOSIS — D50.0 IRON DEFICIENCY ANEMIA DUE TO CHRONIC BLOOD LOSS: ICD-10-CM

## 2024-12-03 DIAGNOSIS — E61.1 IRON DEFICIENCY: ICD-10-CM

## 2024-12-03 DIAGNOSIS — E44.1 MILD PROTEIN-CALORIE MALNUTRITION (HCC): ICD-10-CM

## 2024-12-03 DIAGNOSIS — R63.0 ANOREXIA: Primary | ICD-10-CM

## 2024-12-03 DIAGNOSIS — R11.10 VOMITING AND DIARRHEA: ICD-10-CM

## 2024-12-03 DIAGNOSIS — R19.7 VOMITING AND DIARRHEA: Primary | ICD-10-CM

## 2024-12-03 DIAGNOSIS — R11.10 VOMITING AND DIARRHEA: Primary | ICD-10-CM

## 2024-12-03 PROCEDURE — 96367 TX/PROPH/DG ADDL SEQ IV INF: CPT

## 2024-12-03 PROCEDURE — 96365 THER/PROPH/DIAG IV INF INIT: CPT

## 2024-12-03 PROCEDURE — 96366 THER/PROPH/DIAG IV INF ADDON: CPT

## 2024-12-03 PROCEDURE — 96375 TX/PRO/DX INJ NEW DRUG ADDON: CPT

## 2024-12-03 RX ORDER — METHYLPREDNISOLONE SODIUM SUCCINATE 40 MG/ML
40 INJECTION, POWDER, LYOPHILIZED, FOR SOLUTION INTRAMUSCULAR; INTRAVENOUS ONCE
Status: CANCELLED
Start: 2024-12-05 | End: 2024-12-04

## 2024-12-03 RX ORDER — SODIUM CHLORIDE, SODIUM GLUCONATE, SODIUM ACETATE, POTASSIUM CHLORIDE, MAGNESIUM CHLORIDE, SODIUM PHOSPHATE, DIBASIC, AND POTASSIUM PHOSPHATE .53; .5; .37; .037; .03; .012; .00082 G/100ML; G/100ML; G/100ML; G/100ML; G/100ML; G/100ML; G/100ML
1000 INJECTION, SOLUTION INTRAVENOUS ONCE
Status: COMPLETED | OUTPATIENT
Start: 2024-12-03 | End: 2024-12-03

## 2024-12-03 RX ORDER — METHYLPREDNISOLONE SODIUM SUCCINATE 40 MG/ML
40 INJECTION, POWDER, LYOPHILIZED, FOR SOLUTION INTRAMUSCULAR; INTRAVENOUS ONCE
Status: COMPLETED | OUTPATIENT
Start: 2024-12-03 | End: 2024-12-03

## 2024-12-03 RX ORDER — SODIUM CHLORIDE, SODIUM GLUCONATE, SODIUM ACETATE, POTASSIUM CHLORIDE, MAGNESIUM CHLORIDE, SODIUM PHOSPHATE, DIBASIC, AND POTASSIUM PHOSPHATE .53; .5; .37; .037; .03; .012; .00082 G/100ML; G/100ML; G/100ML; G/100ML; G/100ML; G/100ML; G/100ML
1000 INJECTION, SOLUTION INTRAVENOUS ONCE
Status: CANCELLED
Start: 2024-12-03 | End: 2024-12-03

## 2024-12-03 RX ORDER — METHYLPREDNISOLONE SODIUM SUCCINATE 40 MG/ML
40 INJECTION, POWDER, LYOPHILIZED, FOR SOLUTION INTRAMUSCULAR; INTRAVENOUS ONCE
Status: CANCELLED
Start: 2024-12-03 | End: 2024-12-03

## 2024-12-03 RX ORDER — SODIUM CHLORIDE, SODIUM GLUCONATE, SODIUM ACETATE, POTASSIUM CHLORIDE, MAGNESIUM CHLORIDE, SODIUM PHOSPHATE, DIBASIC, AND POTASSIUM PHOSPHATE .53; .5; .37; .037; .03; .012; .00082 G/100ML; G/100ML; G/100ML; G/100ML; G/100ML; G/100ML; G/100ML
1000 INJECTION, SOLUTION INTRAVENOUS ONCE
Status: CANCELLED
Start: 2024-12-05 | End: 2024-12-04

## 2024-12-03 RX ADMIN — SODIUM CHLORIDE, SODIUM GLUCONATE, SODIUM ACETATE, POTASSIUM CHLORIDE, MAGNESIUM CHLORIDE, SODIUM PHOSPHATE, DIBASIC, AND POTASSIUM PHOSPHATE 1000 ML: .53; .5; .37; .037; .03; .012; .00082 INJECTION, SOLUTION INTRAVENOUS at 10:24

## 2024-12-03 RX ADMIN — METHYLPREDNISOLONE SODIUM SUCCINATE 40 MG: 40 INJECTION, POWDER, FOR SOLUTION INTRAMUSCULAR; INTRAVENOUS at 10:23

## 2024-12-03 RX ADMIN — DIPHENHYDRAMINE HYDROCHLORIDE 50 MG: 50 INJECTION, SOLUTION INTRAMUSCULAR; INTRAVENOUS at 09:52

## 2024-12-03 RX ADMIN — FAMOTIDINE 40 MG: 10 INJECTION, SOLUTION INTRAVENOUS at 09:32

## 2024-12-03 NOTE — PROGRESS NOTES
Celine Roa  tolerated hydration with pre med  treatment well with no complications.      Celine Roa is aware of future appt on 12/5 0900    AVS printed and given to Celine Roa   No (Declined by Celine Roa)

## 2024-12-03 NOTE — PLAN OF CARE
Problem: Knowledge Deficit  Goal: Patient/family/caregiver demonstrates understanding of disease process, treatment plan, medications, and discharge instructions  Description: Complete learning assessment and assess knowledge base.  Interventions:  - Provide teaching at level of understanding  - Provide teaching via preferred learning methods  12/3/2024 0913 by Amanda Michel RN  Outcome: Progressing  12/3/2024 0912 by Amanda Michel, RN  Outcome: Progressing

## 2024-12-05 ENCOUNTER — HOSPITAL ENCOUNTER (OUTPATIENT)
Dept: INFUSION CENTER | Facility: HOSPITAL | Age: 49
Discharge: HOME | End: 2024-12-05
Payer: COMMERCIAL

## 2024-12-05 VITALS
SYSTOLIC BLOOD PRESSURE: 120 MMHG | DIASTOLIC BLOOD PRESSURE: 83 MMHG | RESPIRATION RATE: 18 BRPM | TEMPERATURE: 97.1 F | HEART RATE: 101 BPM

## 2024-12-05 DIAGNOSIS — D50.0 IRON DEFICIENCY ANEMIA DUE TO CHRONIC BLOOD LOSS: ICD-10-CM

## 2024-12-05 DIAGNOSIS — E44.1 MILD PROTEIN-CALORIE MALNUTRITION (HCC): ICD-10-CM

## 2024-12-05 DIAGNOSIS — R19.7 VOMITING AND DIARRHEA: Primary | ICD-10-CM

## 2024-12-05 DIAGNOSIS — E61.1 IRON DEFICIENCY: ICD-10-CM

## 2024-12-05 DIAGNOSIS — R11.10 VOMITING AND DIARRHEA: Primary | ICD-10-CM

## 2024-12-05 DIAGNOSIS — R63.0 ANOREXIA: ICD-10-CM

## 2024-12-05 PROCEDURE — 96365 THER/PROPH/DIAG IV INF INIT: CPT

## 2024-12-05 PROCEDURE — 96367 TX/PROPH/DG ADDL SEQ IV INF: CPT

## 2024-12-05 PROCEDURE — 96366 THER/PROPH/DIAG IV INF ADDON: CPT

## 2024-12-05 PROCEDURE — 96375 TX/PRO/DX INJ NEW DRUG ADDON: CPT

## 2024-12-05 RX ORDER — METHYLPREDNISOLONE SODIUM SUCCINATE 40 MG/ML
40 INJECTION, POWDER, LYOPHILIZED, FOR SOLUTION INTRAMUSCULAR; INTRAVENOUS ONCE
Status: CANCELLED
Start: 2024-12-06 | End: 2024-12-06

## 2024-12-05 RX ORDER — METHYLPREDNISOLONE SODIUM SUCCINATE 40 MG/ML
40 INJECTION, POWDER, LYOPHILIZED, FOR SOLUTION INTRAMUSCULAR; INTRAVENOUS ONCE
Status: COMPLETED | OUTPATIENT
Start: 2024-12-05 | End: 2024-12-05

## 2024-12-05 RX ORDER — SODIUM CHLORIDE, SODIUM GLUCONATE, SODIUM ACETATE, POTASSIUM CHLORIDE, MAGNESIUM CHLORIDE, SODIUM PHOSPHATE, DIBASIC, AND POTASSIUM PHOSPHATE .53; .5; .37; .037; .03; .012; .00082 G/100ML; G/100ML; G/100ML; G/100ML; G/100ML; G/100ML; G/100ML
1000 INJECTION, SOLUTION INTRAVENOUS ONCE
Status: COMPLETED | OUTPATIENT
Start: 2024-12-05 | End: 2024-12-05

## 2024-12-05 RX ORDER — SODIUM CHLORIDE, SODIUM GLUCONATE, SODIUM ACETATE, POTASSIUM CHLORIDE, MAGNESIUM CHLORIDE, SODIUM PHOSPHATE, DIBASIC, AND POTASSIUM PHOSPHATE .53; .5; .37; .037; .03; .012; .00082 G/100ML; G/100ML; G/100ML; G/100ML; G/100ML; G/100ML; G/100ML
1000 INJECTION, SOLUTION INTRAVENOUS ONCE
Status: CANCELLED
Start: 2024-12-06 | End: 2024-12-06

## 2024-12-05 RX ADMIN — DIPHENHYDRAMINE HYDROCHLORIDE 50 MG: 50 INJECTION, SOLUTION INTRAMUSCULAR; INTRAVENOUS at 09:33

## 2024-12-05 RX ADMIN — FAMOTIDINE 40 MG: 10 INJECTION, SOLUTION INTRAVENOUS at 09:56

## 2024-12-05 RX ADMIN — SODIUM CHLORIDE, SODIUM GLUCONATE, SODIUM ACETATE, POTASSIUM CHLORIDE, MAGNESIUM CHLORIDE, SODIUM PHOSPHATE, DIBASIC, AND POTASSIUM PHOSPHATE 1000 ML: .53; .5; .37; .037; .03; .012; .00082 INJECTION, SOLUTION INTRAVENOUS at 10:23

## 2024-12-05 RX ADMIN — METHYLPREDNISOLONE SODIUM SUCCINATE 40 MG: 40 INJECTION, POWDER, FOR SOLUTION INTRAMUSCULAR; INTRAVENOUS at 10:20

## 2024-12-05 NOTE — PLAN OF CARE
Problem: Potential for Falls  Goal: Patient will remain free of falls  Description: INTERVENTIONS:  - Educate patient/family on patient safety including physical limitations  - Instruct patient to call for assistance with activity   - Consult OT/PT to assist with strengthening/mobility   - Keep Call bell within reach  - Keep bed low and locked with side rails adjusted as appropriate  - Keep care items and personal belongings within reach  - Initiate and maintain comfort rounds  - Make Fall Risk Sign visible to staff  - Apply yellow socks and bracelet for high fall risk patients  - Consider moving patient to room near nurses station  12/5/2024 1240 by Lizeth Mobley, RN  Outcome: Progressing  12/5/2024 1239 by Lizeth Mobley, RN  Outcome: Progressing

## 2024-12-05 NOTE — PROGRESS NOTES
Pt offers no new complaints today, tolerated hydration/benadryl/pepcid/solumedrol without complications, confirmed appt back on 12-10-24 0900 , AVS declined.

## 2024-12-06 DIAGNOSIS — R63.0 ANOREXIA: Primary | ICD-10-CM

## 2024-12-06 DIAGNOSIS — D50.0 IRON DEFICIENCY ANEMIA DUE TO CHRONIC BLOOD LOSS: ICD-10-CM

## 2024-12-06 DIAGNOSIS — E61.1 IRON DEFICIENCY: ICD-10-CM

## 2024-12-06 DIAGNOSIS — E44.1 MILD PROTEIN-CALORIE MALNUTRITION (HCC): ICD-10-CM

## 2024-12-06 DIAGNOSIS — R19.7 VOMITING AND DIARRHEA: ICD-10-CM

## 2024-12-06 DIAGNOSIS — R11.10 VOMITING AND DIARRHEA: ICD-10-CM

## 2024-12-06 RX ORDER — SODIUM CHLORIDE, SODIUM GLUCONATE, SODIUM ACETATE, POTASSIUM CHLORIDE, MAGNESIUM CHLORIDE, SODIUM PHOSPHATE, DIBASIC, AND POTASSIUM PHOSPHATE .53; .5; .37; .037; .03; .012; .00082 G/100ML; G/100ML; G/100ML; G/100ML; G/100ML; G/100ML; G/100ML
1000 INJECTION, SOLUTION INTRAVENOUS ONCE
Status: CANCELLED
Start: 2024-12-10 | End: 2024-12-10

## 2024-12-06 RX ORDER — METHYLPREDNISOLONE SODIUM SUCCINATE 40 MG/ML
40 INJECTION, POWDER, LYOPHILIZED, FOR SOLUTION INTRAMUSCULAR; INTRAVENOUS ONCE
Status: CANCELLED
Start: 2024-12-10 | End: 2024-12-10

## 2024-12-10 ENCOUNTER — HOSPITAL ENCOUNTER (OUTPATIENT)
Dept: INFUSION CENTER | Facility: HOSPITAL | Age: 49
End: 2024-12-10

## 2024-12-10 ENCOUNTER — HOSPITAL ENCOUNTER (OUTPATIENT)
Dept: INFUSION CENTER | Facility: HOSPITAL | Age: 49
Discharge: HOME | End: 2024-12-10
Payer: COMMERCIAL

## 2024-12-10 VITALS
TEMPERATURE: 96.8 F | DIASTOLIC BLOOD PRESSURE: 85 MMHG | OXYGEN SATURATION: 98 % | RESPIRATION RATE: 16 BRPM | HEART RATE: 89 BPM | SYSTOLIC BLOOD PRESSURE: 119 MMHG

## 2024-12-10 DIAGNOSIS — E61.1 IRON DEFICIENCY: ICD-10-CM

## 2024-12-10 DIAGNOSIS — R63.0 ANOREXIA: ICD-10-CM

## 2024-12-10 DIAGNOSIS — E44.1 MILD PROTEIN-CALORIE MALNUTRITION (HCC): ICD-10-CM

## 2024-12-10 DIAGNOSIS — R11.10 VOMITING AND DIARRHEA: Primary | ICD-10-CM

## 2024-12-10 DIAGNOSIS — R19.7 VOMITING AND DIARRHEA: Primary | ICD-10-CM

## 2024-12-10 DIAGNOSIS — D50.0 IRON DEFICIENCY ANEMIA DUE TO CHRONIC BLOOD LOSS: ICD-10-CM

## 2024-12-10 PROCEDURE — 96375 TX/PRO/DX INJ NEW DRUG ADDON: CPT

## 2024-12-10 PROCEDURE — 96367 TX/PROPH/DG ADDL SEQ IV INF: CPT

## 2024-12-10 PROCEDURE — 96366 THER/PROPH/DIAG IV INF ADDON: CPT

## 2024-12-10 PROCEDURE — 96365 THER/PROPH/DIAG IV INF INIT: CPT

## 2024-12-10 RX ORDER — SODIUM CHLORIDE, SODIUM GLUCONATE, SODIUM ACETATE, POTASSIUM CHLORIDE, MAGNESIUM CHLORIDE, SODIUM PHOSPHATE, DIBASIC, AND POTASSIUM PHOSPHATE .53; .5; .37; .037; .03; .012; .00082 G/100ML; G/100ML; G/100ML; G/100ML; G/100ML; G/100ML; G/100ML
1000 INJECTION, SOLUTION INTRAVENOUS ONCE
Status: CANCELLED
Start: 2024-12-13 | End: 2024-12-11

## 2024-12-10 RX ORDER — SODIUM CHLORIDE, SODIUM GLUCONATE, SODIUM ACETATE, POTASSIUM CHLORIDE, MAGNESIUM CHLORIDE, SODIUM PHOSPHATE, DIBASIC, AND POTASSIUM PHOSPHATE .53; .5; .37; .037; .03; .012; .00082 G/100ML; G/100ML; G/100ML; G/100ML; G/100ML; G/100ML; G/100ML
1000 INJECTION, SOLUTION INTRAVENOUS ONCE
Status: COMPLETED | OUTPATIENT
Start: 2024-12-10 | End: 2024-12-10

## 2024-12-10 RX ORDER — METHYLPREDNISOLONE SODIUM SUCCINATE 40 MG/ML
40 INJECTION, POWDER, LYOPHILIZED, FOR SOLUTION INTRAMUSCULAR; INTRAVENOUS ONCE
Status: COMPLETED | OUTPATIENT
Start: 2024-12-10 | End: 2024-12-10

## 2024-12-10 RX ORDER — METHYLPREDNISOLONE SODIUM SUCCINATE 40 MG/ML
40 INJECTION, POWDER, LYOPHILIZED, FOR SOLUTION INTRAMUSCULAR; INTRAVENOUS ONCE
Status: CANCELLED
Start: 2024-12-13 | End: 2024-12-11

## 2024-12-10 RX ADMIN — DIPHENHYDRAMINE HYDROCHLORIDE 50 MG: 50 INJECTION, SOLUTION INTRAMUSCULAR; INTRAVENOUS at 09:48

## 2024-12-10 RX ADMIN — FAMOTIDINE 40 MG: 10 INJECTION, SOLUTION INTRAVENOUS at 10:24

## 2024-12-10 RX ADMIN — METHYLPREDNISOLONE SODIUM SUCCINATE 40 MG: 40 INJECTION, POWDER, FOR SOLUTION INTRAMUSCULAR; INTRAVENOUS at 11:07

## 2024-12-10 RX ADMIN — SODIUM CHLORIDE, SODIUM GLUCONATE, SODIUM ACETATE, POTASSIUM CHLORIDE, MAGNESIUM CHLORIDE, SODIUM PHOSPHATE, DIBASIC, AND POTASSIUM PHOSPHATE 1000 ML: .53; .5; .37; .037; .03; .012; .00082 INJECTION, SOLUTION INTRAVENOUS at 11:13

## 2024-12-10 NOTE — PLAN OF CARE
Problem: INFECTION - ADULT  Goal: Absence or prevention of progression during hospitalization  Description: INTERVENTIONS:  - Assess and monitor for signs and symptoms of infection  - Monitor lab/diagnostic results  - Monitor all insertion sites, i.e. indwelling lines, tubes, and drains  - Monitor endotracheal if appropriate and nasal secretions for changes in amount and color  - [place] appropriate cooling/warming therapies per order  - Administer medications as ordered  - Instruct and encourage patient and family to use good hand hygiene technique  - Identify and instruct in appropriate isolation precautions for identified infection/condition  Outcome: Progressing     Problem: Knowledge Deficit  Goal: Patient/family/caregiver demonstrates understanding of disease process, treatment plan, medications, and discharge instructions  Description: Complete learning assessment and assess knowledge base.  Interventions:  - Provide teaching at level of understanding  - Provide teaching via preferred learning methods  Outcome: Progressing

## 2024-12-10 NOTE — PROGRESS NOTES
Pt tolerated hydration 1L isolyte and pre-meds well without any complications.  PIV removed without incident.      Celine Roa is aware of future appt on 12/13/24 at 8AM.      AVS declined by Celine Roa.     Pt discharged off unit in stable condition with all personal belongings.

## 2024-12-12 ENCOUNTER — HOSPITAL ENCOUNTER (OUTPATIENT)
Dept: INFUSION CENTER | Facility: HOSPITAL | Age: 49
Discharge: HOME | End: 2024-12-12

## 2024-12-13 ENCOUNTER — HOSPITAL ENCOUNTER (OUTPATIENT)
Dept: INFUSION CENTER | Facility: HOSPITAL | Age: 49
End: 2024-12-13
Payer: COMMERCIAL

## 2024-12-13 VITALS
DIASTOLIC BLOOD PRESSURE: 72 MMHG | OXYGEN SATURATION: 97 % | HEART RATE: 102 BPM | SYSTOLIC BLOOD PRESSURE: 102 MMHG | TEMPERATURE: 98.1 F | RESPIRATION RATE: 16 BRPM

## 2024-12-13 DIAGNOSIS — E61.1 IRON DEFICIENCY: ICD-10-CM

## 2024-12-13 DIAGNOSIS — R63.0 ANOREXIA: ICD-10-CM

## 2024-12-13 DIAGNOSIS — E44.1 MILD PROTEIN-CALORIE MALNUTRITION (HCC): ICD-10-CM

## 2024-12-13 DIAGNOSIS — D50.0 IRON DEFICIENCY ANEMIA DUE TO CHRONIC BLOOD LOSS: ICD-10-CM

## 2024-12-13 DIAGNOSIS — R11.10 VOMITING AND DIARRHEA: Primary | ICD-10-CM

## 2024-12-13 DIAGNOSIS — R19.7 VOMITING AND DIARRHEA: Primary | ICD-10-CM

## 2024-12-13 PROCEDURE — 96365 THER/PROPH/DIAG IV INF INIT: CPT

## 2024-12-13 PROCEDURE — 96375 TX/PRO/DX INJ NEW DRUG ADDON: CPT

## 2024-12-13 PROCEDURE — 96366 THER/PROPH/DIAG IV INF ADDON: CPT

## 2024-12-13 PROCEDURE — 96367 TX/PROPH/DG ADDL SEQ IV INF: CPT

## 2024-12-13 RX ORDER — SODIUM CHLORIDE, SODIUM GLUCONATE, SODIUM ACETATE, POTASSIUM CHLORIDE, MAGNESIUM CHLORIDE, SODIUM PHOSPHATE, DIBASIC, AND POTASSIUM PHOSPHATE .53; .5; .37; .037; .03; .012; .00082 G/100ML; G/100ML; G/100ML; G/100ML; G/100ML; G/100ML; G/100ML
1000 INJECTION, SOLUTION INTRAVENOUS ONCE
Status: COMPLETED | OUTPATIENT
Start: 2024-12-13 | End: 2024-12-13

## 2024-12-13 RX ORDER — METHYLPREDNISOLONE SODIUM SUCCINATE 40 MG/ML
40 INJECTION, POWDER, LYOPHILIZED, FOR SOLUTION INTRAMUSCULAR; INTRAVENOUS ONCE
Status: COMPLETED | OUTPATIENT
Start: 2024-12-13 | End: 2024-12-13

## 2024-12-13 RX ORDER — SODIUM CHLORIDE, SODIUM GLUCONATE, SODIUM ACETATE, POTASSIUM CHLORIDE, MAGNESIUM CHLORIDE, SODIUM PHOSPHATE, DIBASIC, AND POTASSIUM PHOSPHATE .53; .5; .37; .037; .03; .012; .00082 G/100ML; G/100ML; G/100ML; G/100ML; G/100ML; G/100ML; G/100ML
1000 INJECTION, SOLUTION INTRAVENOUS ONCE
Status: CANCELLED
Start: 2024-12-13 | End: 2024-12-14

## 2024-12-13 RX ORDER — METHYLPREDNISOLONE SODIUM SUCCINATE 40 MG/ML
40 INJECTION, POWDER, LYOPHILIZED, FOR SOLUTION INTRAMUSCULAR; INTRAVENOUS ONCE
Status: CANCELLED
Start: 2024-12-13 | End: 2024-12-14

## 2024-12-13 RX ADMIN — METHYLPREDNISOLONE SODIUM SUCCINATE 40 MG: 40 INJECTION, POWDER, FOR SOLUTION INTRAMUSCULAR; INTRAVENOUS at 09:40

## 2024-12-13 RX ADMIN — SODIUM CHLORIDE, SODIUM GLUCONATE, SODIUM ACETATE, POTASSIUM CHLORIDE, MAGNESIUM CHLORIDE, SODIUM PHOSPHATE, DIBASIC, AND POTASSIUM PHOSPHATE 1000 ML: .53; .5; .37; .037; .03; .012; .00082 INJECTION, SOLUTION INTRAVENOUS at 09:43

## 2024-12-13 RX ADMIN — DIPHENHYDRAMINE HYDROCHLORIDE 50 MG: 50 INJECTION, SOLUTION INTRAMUSCULAR; INTRAVENOUS at 08:24

## 2024-12-13 RX ADMIN — FAMOTIDINE 40 MG: 10 INJECTION, SOLUTION INTRAVENOUS at 09:11

## 2024-12-13 NOTE — PLAN OF CARE
Problem: Potential for Falls  Goal: Patient will remain free of falls  Description: INTERVENTIONS:  - Educate patient/family on patient safety including physical limitations  - Instruct patient to call for assistance with activity   - Keep Call bell within reach  - Keep bed low and locked with side rails adjusted as appropriate  - Keep care items and personal belongings within reach  - Consider moving patient to room near nurses station  Outcome: Progressing     Problem: INFECTION - ADULT  Goal: Absence or prevention of progression during hospitalization  Description: INTERVENTIONS:  - Assess and monitor for signs and symptoms of infection  - Monitor lab/diagnostic results  - Monitor all insertion sites, i.e. indwelling lines, tubes, and drains  - Monitor endotracheal if appropriate and nasal secretions for changes in amount and color  - [place] appropriate cooling/warming therapies per order  - Administer medications as ordered  - Instruct and encourage patient and family to use good hand hygiene technique  - Identify and instruct in appropriate isolation precautions for identified infection/condition  Outcome: Progressing     Problem: Knowledge Deficit  Goal: Patient/family/caregiver demonstrates understanding of disease process, treatment plan, medications, and discharge instructions  Description: Complete learning assessment and assess knowledge base.  Interventions:  - Provide teaching at level of understanding  - Provide teaching via preferred learning methods  Outcome: Progressing

## 2024-12-13 NOTE — PROGRESS NOTES
Pt tolerated hydration 1L isolyte and pre-meds well without any complications.  PIV removed without incident.      Celine Roa is aware of future appt on 12/16/24 at 10:30AM.      AVS declined by Celine Roa.     Pt discharged off unit in stable condition with all personal belongings.

## 2024-12-16 ENCOUNTER — HOSPITAL ENCOUNTER (OUTPATIENT)
Dept: INFUSION CENTER | Facility: HOSPITAL | Age: 49
Discharge: HOME | End: 2024-12-16
Attending: INTERNAL MEDICINE
Payer: COMMERCIAL

## 2024-12-16 VITALS
SYSTOLIC BLOOD PRESSURE: 140 MMHG | TEMPERATURE: 98.5 F | HEART RATE: 74 BPM | DIASTOLIC BLOOD PRESSURE: 95 MMHG | RESPIRATION RATE: 18 BRPM | OXYGEN SATURATION: 96 %

## 2024-12-16 DIAGNOSIS — R11.10 VOMITING AND DIARRHEA: Primary | ICD-10-CM

## 2024-12-16 DIAGNOSIS — E61.1 IRON DEFICIENCY: ICD-10-CM

## 2024-12-16 DIAGNOSIS — D50.0 IRON DEFICIENCY ANEMIA DUE TO CHRONIC BLOOD LOSS: ICD-10-CM

## 2024-12-16 DIAGNOSIS — R19.7 VOMITING AND DIARRHEA: Primary | ICD-10-CM

## 2024-12-16 DIAGNOSIS — R63.0 ANOREXIA: ICD-10-CM

## 2024-12-16 DIAGNOSIS — E44.1 MILD PROTEIN-CALORIE MALNUTRITION (HCC): ICD-10-CM

## 2024-12-16 PROCEDURE — 96366 THER/PROPH/DIAG IV INF ADDON: CPT

## 2024-12-16 PROCEDURE — 96367 TX/PROPH/DG ADDL SEQ IV INF: CPT

## 2024-12-16 PROCEDURE — 96365 THER/PROPH/DIAG IV INF INIT: CPT

## 2024-12-16 PROCEDURE — 96375 TX/PRO/DX INJ NEW DRUG ADDON: CPT

## 2024-12-16 RX ORDER — SODIUM CHLORIDE, SODIUM GLUCONATE, SODIUM ACETATE, POTASSIUM CHLORIDE, MAGNESIUM CHLORIDE, SODIUM PHOSPHATE, DIBASIC, AND POTASSIUM PHOSPHATE .53; .5; .37; .037; .03; .012; .00082 G/100ML; G/100ML; G/100ML; G/100ML; G/100ML; G/100ML; G/100ML
1000 INJECTION, SOLUTION INTRAVENOUS ONCE
Status: COMPLETED | OUTPATIENT
Start: 2024-12-16 | End: 2024-12-16

## 2024-12-16 RX ORDER — SODIUM CHLORIDE, SODIUM GLUCONATE, SODIUM ACETATE, POTASSIUM CHLORIDE, MAGNESIUM CHLORIDE, SODIUM PHOSPHATE, DIBASIC, AND POTASSIUM PHOSPHATE .53; .5; .37; .037; .03; .012; .00082 G/100ML; G/100ML; G/100ML; G/100ML; G/100ML; G/100ML; G/100ML
1000 INJECTION, SOLUTION INTRAVENOUS ONCE
Status: CANCELLED
Start: 2024-12-24 | End: 2024-12-17

## 2024-12-16 RX ORDER — METHYLPREDNISOLONE SODIUM SUCCINATE 40 MG/ML
40 INJECTION, POWDER, LYOPHILIZED, FOR SOLUTION INTRAMUSCULAR; INTRAVENOUS ONCE
Status: CANCELLED
Start: 2024-12-24 | End: 2024-12-17

## 2024-12-16 RX ORDER — METHYLPREDNISOLONE SODIUM SUCCINATE 40 MG/ML
40 INJECTION, POWDER, LYOPHILIZED, FOR SOLUTION INTRAMUSCULAR; INTRAVENOUS ONCE
Status: COMPLETED | OUTPATIENT
Start: 2024-12-16 | End: 2024-12-16

## 2024-12-16 RX ADMIN — METHYLPREDNISOLONE SODIUM SUCCINATE 40 MG: 40 INJECTION, POWDER, FOR SOLUTION INTRAMUSCULAR; INTRAVENOUS at 12:14

## 2024-12-16 RX ADMIN — FAMOTIDINE 40 MG: 10 INJECTION, SOLUTION INTRAVENOUS at 11:44

## 2024-12-16 RX ADMIN — SODIUM CHLORIDE, SODIUM GLUCONATE, SODIUM ACETATE, POTASSIUM CHLORIDE, MAGNESIUM CHLORIDE, SODIUM PHOSPHATE, DIBASIC, AND POTASSIUM PHOSPHATE 1000 ML: .53; .5; .37; .037; .03; .012; .00082 INJECTION, SOLUTION INTRAVENOUS at 12:17

## 2024-12-16 RX ADMIN — DIPHENHYDRAMINE HYDROCHLORIDE 50 MG: 50 INJECTION, SOLUTION INTRAMUSCULAR; INTRAVENOUS at 11:08

## 2024-12-16 NOTE — PROGRESS NOTES
Celine Roa  tolerated treatment well with no complications.      Celine Roa is aware of future appt on 12/24/24 at 0900.     AVS declined by Celine Roa.

## 2024-12-16 NOTE — PLAN OF CARE
Problem: INFECTION - ADULT  Goal: Absence or prevention of progression during hospitalization  Description: INTERVENTIONS:  - Assess and monitor for signs and symptoms of infection  - Monitor lab/diagnostic results  - Monitor all insertion sites, i.e. indwelling lines, tubes, and drains  - Monitor endotracheal if appropriate and nasal secretions for changes in amount and color  - [place] appropriate cooling/warming therapies per order  - Administer medications as ordered  - Instruct and encourage patient and family to use good hand hygiene technique  - Identify and instruct in appropriate isolation precautions for identified infection/condition  Outcome: Progressing

## 2024-12-18 ENCOUNTER — TELEPHONE (OUTPATIENT)
Age: 49
End: 2024-12-18

## 2024-12-18 NOTE — TELEPHONE ENCOUNTER
Telemed 10/09/24      Miranda Pharmacy Tech with [facility], reports pt is allergic is dextrose and polyethylene Glycol which are both found in Rinvoq. [facility] is inquiring if provider wishes to move forward with this medication.    Please call [facility] provider line @ [phone] with update.

## 2024-12-19 NOTE — TELEPHONE ENCOUNTER
I don't see an allergy listed to dextrose in her chart and then the polyethylene glycol she had vomiting/diarrhea but this is a bowel prep so these could happen from the prep, wouldn't really consider it an allergy. Can it be confirmed that she doesn't have a significant anaphylactic allergy to dextrose? I am not sure how much would really be in the rinvoq formulation but if she doesn't have an allergy it should be fine to proceed otherwise we may need to check with Dr. Levine what an alternative medicine would be for her to take as he sees her.

## 2024-12-19 NOTE — TELEPHONE ENCOUNTER
I spoke with the patient. Patient reports an allergy to corn, allergic reactions range from hives, GI upset and anaphylaxis. Pt requesting Dr. Levine review alternative medication that is not in a pill form.

## 2024-12-20 NOTE — TELEPHONE ENCOUNTER
Called patient, reached voicemail, left message to call back to discuss new medication provider is recommending.

## 2024-12-23 NOTE — TELEPHONE ENCOUNTER
Called patient, reached voicemail, mailbox full, unable to leave a message. [employer] message sent.

## 2024-12-24 ENCOUNTER — HOSPITAL ENCOUNTER (OUTPATIENT)
Dept: INFUSION CENTER | Facility: HOSPITAL | Age: 49
Discharge: HOME | End: 2024-12-24
Payer: COMMERCIAL

## 2024-12-24 VITALS
DIASTOLIC BLOOD PRESSURE: 85 MMHG | TEMPERATURE: 97.4 F | SYSTOLIC BLOOD PRESSURE: 117 MMHG | HEART RATE: 104 BPM | RESPIRATION RATE: 18 BRPM

## 2024-12-24 DIAGNOSIS — D50.0 IRON DEFICIENCY ANEMIA DUE TO CHRONIC BLOOD LOSS: ICD-10-CM

## 2024-12-24 DIAGNOSIS — R11.10 VOMITING AND DIARRHEA: Primary | ICD-10-CM

## 2024-12-24 DIAGNOSIS — E61.1 IRON DEFICIENCY: ICD-10-CM

## 2024-12-24 DIAGNOSIS — R63.0 ANOREXIA: ICD-10-CM

## 2024-12-24 DIAGNOSIS — R19.7 VOMITING AND DIARRHEA: Primary | ICD-10-CM

## 2024-12-24 DIAGNOSIS — L50.1 CHRONIC IDIOPATHIC URTICARIA: ICD-10-CM

## 2024-12-24 DIAGNOSIS — E44.1 MILD PROTEIN-CALORIE MALNUTRITION (HCC): ICD-10-CM

## 2024-12-24 DIAGNOSIS — L50.1 CHRONIC IDIOPATHIC URTICARIA: Primary | ICD-10-CM

## 2024-12-24 RX ORDER — METHYLPREDNISOLONE SODIUM SUCCINATE 40 MG/ML
40 INJECTION, POWDER, LYOPHILIZED, FOR SOLUTION INTRAMUSCULAR; INTRAVENOUS ONCE
Start: 2024-12-26 | End: 2024-12-25

## 2024-12-24 RX ORDER — EPINEPHRINE 1 MG/ML
0.3 INJECTION, SOLUTION, CONCENTRATE INTRAVENOUS
Status: DISCONTINUED | OUTPATIENT
Start: 2024-12-24 | End: 2024-12-27 | Stop reason: HOSPADM

## 2024-12-24 RX ORDER — SODIUM CHLORIDE, SODIUM GLUCONATE, SODIUM ACETATE, POTASSIUM CHLORIDE, MAGNESIUM CHLORIDE, SODIUM PHOSPHATE, DIBASIC, AND POTASSIUM PHOSPHATE .53; .5; .37; .037; .03; .012; .00082 G/100ML; G/100ML; G/100ML; G/100ML; G/100ML; G/100ML; G/100ML
1000 INJECTION, SOLUTION INTRAVENOUS ONCE
Status: COMPLETED | OUTPATIENT
Start: 2024-12-24 | End: 2024-12-24

## 2024-12-24 RX ORDER — METHYLPREDNISOLONE SODIUM SUCCINATE 40 MG/ML
40 INJECTION, POWDER, LYOPHILIZED, FOR SOLUTION INTRAMUSCULAR; INTRAVENOUS ONCE
Status: COMPLETED | OUTPATIENT
Start: 2024-12-24 | End: 2024-12-24

## 2024-12-24 RX ORDER — SODIUM CHLORIDE, SODIUM GLUCONATE, SODIUM ACETATE, POTASSIUM CHLORIDE, MAGNESIUM CHLORIDE, SODIUM PHOSPHATE, DIBASIC, AND POTASSIUM PHOSPHATE .53; .5; .37; .037; .03; .012; .00082 G/100ML; G/100ML; G/100ML; G/100ML; G/100ML; G/100ML; G/100ML
1000 INJECTION, SOLUTION INTRAVENOUS ONCE
Start: 2024-12-26 | End: 2024-12-25

## 2024-12-24 RX ORDER — EPINEPHRINE 1 MG/ML
0.3 INJECTION, SOLUTION, CONCENTRATE INTRAVENOUS
OUTPATIENT
Start: 2025-01-21

## 2024-12-24 RX ADMIN — OMALIZUMAB 300 MG: 150 INJECTION, SOLUTION SUBCUTANEOUS at 14:01

## 2024-12-24 RX ADMIN — SODIUM CHLORIDE, SODIUM GLUCONATE, SODIUM ACETATE, POTASSIUM CHLORIDE, MAGNESIUM CHLORIDE, SODIUM PHOSPHATE, DIBASIC, AND POTASSIUM PHOSPHATE 1000 ML: .53; .5; .37; .037; .03; .012; .00082 INJECTION, SOLUTION INTRAVENOUS at 11:32

## 2024-12-24 RX ADMIN — FAMOTIDINE 40 MG: 10 INJECTION, SOLUTION INTRAVENOUS at 10:51

## 2024-12-24 RX ADMIN — DIPHENHYDRAMINE HYDROCHLORIDE 50 MG: 50 INJECTION, SOLUTION INTRAMUSCULAR; INTRAVENOUS at 09:40

## 2024-12-24 RX ADMIN — METHYLPREDNISOLONE SODIUM SUCCINATE 40 MG: 40 INJECTION, POWDER, FOR SOLUTION INTRAMUSCULAR; INTRAVENOUS at 11:30

## 2024-12-24 NOTE — PROGRESS NOTES
Celine Rao tolerated hydration treatment well with no complications.  Xolair given sq in bilateral abdomen x2 with out incident.      Celine Rao is aware of future appt on 12/26/24 at 1430.     AVS declined.

## 2024-12-27 NOTE — TELEPHONE ENCOUNTER
Attempted to contact pt x 2, [employer] message not read. Letter written 12/27 mailed to pts listed address.

## 2024-12-31 ENCOUNTER — HOSPITAL ENCOUNTER (OUTPATIENT)
Dept: INFUSION CENTER | Facility: HOSPITAL | Age: 49
Discharge: HOME | End: 2024-12-31
Payer: COMMERCIAL

## 2024-12-31 VITALS
TEMPERATURE: 96.8 F | DIASTOLIC BLOOD PRESSURE: 79 MMHG | SYSTOLIC BLOOD PRESSURE: 113 MMHG | HEART RATE: 84 BPM | RESPIRATION RATE: 16 BRPM

## 2024-12-31 DIAGNOSIS — R19.7 VOMITING AND DIARRHEA: Primary | ICD-10-CM

## 2024-12-31 DIAGNOSIS — E44.1 MILD PROTEIN-CALORIE MALNUTRITION (HCC): ICD-10-CM

## 2024-12-31 DIAGNOSIS — D50.0 IRON DEFICIENCY ANEMIA DUE TO CHRONIC BLOOD LOSS: ICD-10-CM

## 2024-12-31 DIAGNOSIS — R11.10 VOMITING AND DIARRHEA: Primary | ICD-10-CM

## 2024-12-31 DIAGNOSIS — R63.0 ANOREXIA: ICD-10-CM

## 2024-12-31 DIAGNOSIS — E61.1 IRON DEFICIENCY: ICD-10-CM

## 2024-12-31 RX ORDER — METHYLPREDNISOLONE SODIUM SUCCINATE 40 MG/ML
40 INJECTION, POWDER, LYOPHILIZED, FOR SOLUTION INTRAMUSCULAR; INTRAVENOUS ONCE
Status: CANCELLED
Start: 2024-12-31 | End: 2025-01-01

## 2024-12-31 RX ORDER — SODIUM CHLORIDE, SODIUM GLUCONATE, SODIUM ACETATE, POTASSIUM CHLORIDE, MAGNESIUM CHLORIDE, SODIUM PHOSPHATE, DIBASIC, AND POTASSIUM PHOSPHATE .53; .5; .37; .037; .03; .012; .00082 G/100ML; G/100ML; G/100ML; G/100ML; G/100ML; G/100ML; G/100ML
1000 INJECTION, SOLUTION INTRAVENOUS ONCE
Status: COMPLETED | OUTPATIENT
Start: 2024-12-31 | End: 2024-12-31

## 2024-12-31 RX ORDER — METHYLPREDNISOLONE SODIUM SUCCINATE 40 MG/ML
40 INJECTION, POWDER, LYOPHILIZED, FOR SOLUTION INTRAMUSCULAR; INTRAVENOUS ONCE
Status: COMPLETED | OUTPATIENT
Start: 2024-12-31 | End: 2024-12-31

## 2024-12-31 RX ORDER — SODIUM CHLORIDE, SODIUM GLUCONATE, SODIUM ACETATE, POTASSIUM CHLORIDE, MAGNESIUM CHLORIDE, SODIUM PHOSPHATE, DIBASIC, AND POTASSIUM PHOSPHATE .53; .5; .37; .037; .03; .012; .00082 G/100ML; G/100ML; G/100ML; G/100ML; G/100ML; G/100ML; G/100ML
1000 INJECTION, SOLUTION INTRAVENOUS ONCE
Status: CANCELLED
Start: 2024-12-31 | End: 2025-01-01

## 2024-12-31 RX ADMIN — SODIUM CHLORIDE, SODIUM GLUCONATE, SODIUM ACETATE, POTASSIUM CHLORIDE, MAGNESIUM CHLORIDE, SODIUM PHOSPHATE, DIBASIC, AND POTASSIUM PHOSPHATE 1000 ML: .53; .5; .37; .037; .03; .012; .00082 INJECTION, SOLUTION INTRAVENOUS at 10:41

## 2024-12-31 RX ADMIN — METHYLPREDNISOLONE SODIUM SUCCINATE 40 MG: 40 INJECTION, POWDER, FOR SOLUTION INTRAMUSCULAR; INTRAVENOUS at 10:32

## 2024-12-31 RX ADMIN — FAMOTIDINE 40 MG: 10 INJECTION, SOLUTION INTRAVENOUS at 10:03

## 2024-12-31 RX ADMIN — DIPHENHYDRAMINE HYDROCHLORIDE 50 MG: 50 INJECTION, SOLUTION INTRAMUSCULAR; INTRAVENOUS at 09:41

## 2024-12-31 NOTE — PROGRESS NOTES
Patient tolerated Isolyte IV hydration without issues.      Celine Roa is aware of future appt on 1/2/24 at 0900.     AVS -  No (Declined by Celine Roa) Patient has Mychart.    Patient ambulated off unit without incident.  All personal belongings taken with patient.

## 2025-01-02 ENCOUNTER — HOSPITAL ENCOUNTER (OUTPATIENT)
Dept: INFUSION CENTER | Facility: HOSPITAL | Age: 50
Discharge: HOME | End: 2025-01-02
Payer: COMMERCIAL

## 2025-01-02 VITALS
RESPIRATION RATE: 16 BRPM | HEART RATE: 102 BPM | TEMPERATURE: 96.2 F | DIASTOLIC BLOOD PRESSURE: 83 MMHG | SYSTOLIC BLOOD PRESSURE: 123 MMHG

## 2025-01-02 DIAGNOSIS — D50.0 IRON DEFICIENCY ANEMIA DUE TO CHRONIC BLOOD LOSS: ICD-10-CM

## 2025-01-02 DIAGNOSIS — E44.1 MILD PROTEIN-CALORIE MALNUTRITION (HCC): ICD-10-CM

## 2025-01-02 DIAGNOSIS — R63.0 ANOREXIA: ICD-10-CM

## 2025-01-02 DIAGNOSIS — R11.10 VOMITING AND DIARRHEA: Primary | ICD-10-CM

## 2025-01-02 DIAGNOSIS — R19.7 VOMITING AND DIARRHEA: Primary | ICD-10-CM

## 2025-01-02 DIAGNOSIS — E61.1 IRON DEFICIENCY: ICD-10-CM

## 2025-01-02 PROCEDURE — 96367 TX/PROPH/DG ADDL SEQ IV INF: CPT

## 2025-01-02 PROCEDURE — 96366 THER/PROPH/DIAG IV INF ADDON: CPT

## 2025-01-02 PROCEDURE — 96375 TX/PRO/DX INJ NEW DRUG ADDON: CPT

## 2025-01-02 PROCEDURE — 96365 THER/PROPH/DIAG IV INF INIT: CPT

## 2025-01-02 RX ORDER — METHYLPREDNISOLONE SODIUM SUCCINATE 40 MG/ML
40 INJECTION, POWDER, LYOPHILIZED, FOR SOLUTION INTRAMUSCULAR; INTRAVENOUS ONCE
Status: COMPLETED | OUTPATIENT
Start: 2025-01-02 | End: 2025-01-02

## 2025-01-02 RX ORDER — SODIUM CHLORIDE, SODIUM GLUCONATE, SODIUM ACETATE, POTASSIUM CHLORIDE, MAGNESIUM CHLORIDE, SODIUM PHOSPHATE, DIBASIC, AND POTASSIUM PHOSPHATE .53; .5; .37; .037; .03; .012; .00082 G/100ML; G/100ML; G/100ML; G/100ML; G/100ML; G/100ML; G/100ML
1000 INJECTION, SOLUTION INTRAVENOUS ONCE
Status: COMPLETED | OUTPATIENT
Start: 2025-01-02 | End: 2025-01-02

## 2025-01-02 RX ORDER — METHYLPREDNISOLONE SODIUM SUCCINATE 40 MG/ML
40 INJECTION, POWDER, LYOPHILIZED, FOR SOLUTION INTRAMUSCULAR; INTRAVENOUS ONCE
Status: CANCELLED
Start: 2025-01-02 | End: 2025-01-03

## 2025-01-02 RX ORDER — SODIUM CHLORIDE, SODIUM GLUCONATE, SODIUM ACETATE, POTASSIUM CHLORIDE, MAGNESIUM CHLORIDE, SODIUM PHOSPHATE, DIBASIC, AND POTASSIUM PHOSPHATE .53; .5; .37; .037; .03; .012; .00082 G/100ML; G/100ML; G/100ML; G/100ML; G/100ML; G/100ML; G/100ML
1000 INJECTION, SOLUTION INTRAVENOUS ONCE
Status: CANCELLED
Start: 2025-01-02 | End: 2025-01-03

## 2025-01-02 RX ADMIN — FAMOTIDINE 40 MG: 10 INJECTION, SOLUTION INTRAVENOUS at 10:15

## 2025-01-02 RX ADMIN — METHYLPREDNISOLONE SODIUM SUCCINATE 40 MG: 40 INJECTION, POWDER, FOR SOLUTION INTRAMUSCULAR; INTRAVENOUS at 10:34

## 2025-01-02 RX ADMIN — SODIUM CHLORIDE, SODIUM GLUCONATE, SODIUM ACETATE, POTASSIUM CHLORIDE, MAGNESIUM CHLORIDE, SODIUM PHOSPHATE, DIBASIC, AND POTASSIUM PHOSPHATE 1000 ML: .53; .5; .37; .037; .03; .012; .00082 INJECTION, SOLUTION INTRAVENOUS at 10:41

## 2025-01-02 RX ADMIN — DIPHENHYDRAMINE HYDROCHLORIDE 50 MG: 50 INJECTION, SOLUTION INTRAMUSCULAR; INTRAVENOUS at 09:16

## 2025-01-02 NOTE — PROGRESS NOTES
Pt tolerated treatment with Isolyte without any reactions/infiltrates.    Declined AVS (has MYCHART) and is aware to return on 1/7/25 at 8:30am.    Was discharged in stable condition with all of her belongings.

## 2025-01-03 ENCOUNTER — TELEPHONE (OUTPATIENT)
Dept: FAMILY MEDICINE CLINIC | Facility: HOME HEALTHCARE | Age: 50
End: 2025-01-03

## 2025-01-03 NOTE — TELEPHONE ENCOUNTER
----- Message from Renee Akers MD sent at 1/3/2025  2:57 PM EST -----  Can you call for patient annual physical, thanks!

## 2025-01-07 ENCOUNTER — TELEPHONE (OUTPATIENT)
Age: 50
End: 2025-01-07

## 2025-01-07 ENCOUNTER — HOSPITAL ENCOUNTER (OUTPATIENT)
Dept: INFUSION CENTER | Facility: HOSPITAL | Age: 50
Discharge: HOME | End: 2025-01-07
Attending: INTERNAL MEDICINE
Payer: COMMERCIAL

## 2025-01-07 VITALS
HEART RATE: 100 BPM | SYSTOLIC BLOOD PRESSURE: 117 MMHG | RESPIRATION RATE: 16 BRPM | TEMPERATURE: 96.8 F | DIASTOLIC BLOOD PRESSURE: 81 MMHG

## 2025-01-07 DIAGNOSIS — D50.0 IRON DEFICIENCY ANEMIA DUE TO CHRONIC BLOOD LOSS: ICD-10-CM

## 2025-01-07 DIAGNOSIS — E44.1 MILD PROTEIN-CALORIE MALNUTRITION (HCC): ICD-10-CM

## 2025-01-07 DIAGNOSIS — R19.7 VOMITING AND DIARRHEA: Primary | ICD-10-CM

## 2025-01-07 DIAGNOSIS — E61.1 IRON DEFICIENCY: ICD-10-CM

## 2025-01-07 DIAGNOSIS — R11.10 VOMITING AND DIARRHEA: Primary | ICD-10-CM

## 2025-01-07 DIAGNOSIS — R63.0 ANOREXIA: ICD-10-CM

## 2025-01-07 PROCEDURE — 96365 THER/PROPH/DIAG IV INF INIT: CPT

## 2025-01-07 PROCEDURE — 96375 TX/PRO/DX INJ NEW DRUG ADDON: CPT

## 2025-01-07 PROCEDURE — 96367 TX/PROPH/DG ADDL SEQ IV INF: CPT

## 2025-01-07 PROCEDURE — 96366 THER/PROPH/DIAG IV INF ADDON: CPT

## 2025-01-07 RX ORDER — METHYLPREDNISOLONE SODIUM SUCCINATE 40 MG/ML
40 INJECTION, POWDER, LYOPHILIZED, FOR SOLUTION INTRAMUSCULAR; INTRAVENOUS ONCE
Status: COMPLETED | OUTPATIENT
Start: 2025-01-07 | End: 2025-01-07

## 2025-01-07 RX ORDER — SODIUM CHLORIDE, SODIUM GLUCONATE, SODIUM ACETATE, POTASSIUM CHLORIDE, MAGNESIUM CHLORIDE, SODIUM PHOSPHATE, DIBASIC, AND POTASSIUM PHOSPHATE .53; .5; .37; .037; .03; .012; .00082 G/100ML; G/100ML; G/100ML; G/100ML; G/100ML; G/100ML; G/100ML
1000 INJECTION, SOLUTION INTRAVENOUS ONCE
Status: CANCELLED
Start: 2025-01-09 | End: 2025-01-08

## 2025-01-07 RX ORDER — METHYLPREDNISOLONE SODIUM SUCCINATE 40 MG/ML
40 INJECTION, POWDER, LYOPHILIZED, FOR SOLUTION INTRAMUSCULAR; INTRAVENOUS ONCE
Status: CANCELLED
Start: 2025-01-09 | End: 2025-01-08

## 2025-01-07 RX ORDER — SODIUM CHLORIDE, SODIUM GLUCONATE, SODIUM ACETATE, POTASSIUM CHLORIDE, MAGNESIUM CHLORIDE, SODIUM PHOSPHATE, DIBASIC, AND POTASSIUM PHOSPHATE .53; .5; .37; .037; .03; .012; .00082 G/100ML; G/100ML; G/100ML; G/100ML; G/100ML; G/100ML; G/100ML
1000 INJECTION, SOLUTION INTRAVENOUS ONCE
Status: COMPLETED | OUTPATIENT
Start: 2025-01-07 | End: 2025-01-07

## 2025-01-07 RX ADMIN — METHYLPREDNISOLONE SODIUM SUCCINATE 40 MG: 40 INJECTION, POWDER, FOR SOLUTION INTRAMUSCULAR; INTRAVENOUS at 10:29

## 2025-01-07 RX ADMIN — SODIUM CHLORIDE, SODIUM GLUCONATE, SODIUM ACETATE, POTASSIUM CHLORIDE, MAGNESIUM CHLORIDE, SODIUM PHOSPHATE, DIBASIC, AND POTASSIUM PHOSPHATE 1000 ML: .53; .5; .37; .037; .03; .012; .00082 INJECTION, SOLUTION INTRAVENOUS at 10:33

## 2025-01-07 RX ADMIN — FAMOTIDINE 40 MG: 10 INJECTION, SOLUTION INTRAVENOUS at 10:07

## 2025-01-07 RX ADMIN — DIPHENHYDRAMINE HYDROCHLORIDE 50 MG: 50 INJECTION, SOLUTION INTRAMUSCULAR; INTRAVENOUS at 09:38

## 2025-01-07 NOTE — PROGRESS NOTES
Celine Roa tolerated hydration well with no complications.      Celine Roa is aware of future appt on 1/9/25 at 0800.     AVS declined by Celine Roa.

## 2025-01-07 NOTE — TELEPHONE ENCOUNTER
Robert Ayala,  looks like pt didn't complete lab orders that were placed on 10/9/2024. I can contact her to do so.

## 2025-01-08 DIAGNOSIS — D89.40 MAST CELL ACTIVATION SYNDROME (HCC): ICD-10-CM

## 2025-01-09 ENCOUNTER — HOSPITAL ENCOUNTER (OUTPATIENT)
Dept: INFUSION CENTER | Facility: HOSPITAL | Age: 50
Discharge: HOME | End: 2025-01-09
Attending: INTERNAL MEDICINE
Payer: COMMERCIAL

## 2025-01-09 VITALS
DIASTOLIC BLOOD PRESSURE: 79 MMHG | RESPIRATION RATE: 16 BRPM | SYSTOLIC BLOOD PRESSURE: 121 MMHG | HEART RATE: 95 BPM | TEMPERATURE: 97.3 F

## 2025-01-09 DIAGNOSIS — R19.7 DIARRHEA, UNSPECIFIED TYPE: ICD-10-CM

## 2025-01-09 DIAGNOSIS — E44.1 MILD PROTEIN-CALORIE MALNUTRITION (HCC): ICD-10-CM

## 2025-01-09 DIAGNOSIS — D84.9 IMMUNOSUPPRESSED STATUS (HCC): ICD-10-CM

## 2025-01-09 DIAGNOSIS — K50.10 CROHN'S DISEASE OF COLON WITHOUT COMPLICATION (HCC): ICD-10-CM

## 2025-01-09 DIAGNOSIS — R63.0 ANOREXIA: ICD-10-CM

## 2025-01-09 DIAGNOSIS — R11.10 VOMITING AND DIARRHEA: Primary | ICD-10-CM

## 2025-01-09 DIAGNOSIS — R19.7 VOMITING AND DIARRHEA: Primary | ICD-10-CM

## 2025-01-09 DIAGNOSIS — E61.1 IRON DEFICIENCY: ICD-10-CM

## 2025-01-09 DIAGNOSIS — D50.0 IRON DEFICIENCY ANEMIA DUE TO CHRONIC BLOOD LOSS: ICD-10-CM

## 2025-01-09 LAB
BASOPHILS # BLD AUTO: 0.12 THOUSANDS/ΜL (ref 0–0.1)
BASOPHILS NFR BLD AUTO: 1 % (ref 0–1)
CRP SERPL QL: 4.2 MG/L
EOSINOPHIL # BLD AUTO: 0.24 THOUSAND/ΜL (ref 0–0.61)
EOSINOPHIL NFR BLD AUTO: 2 % (ref 0–6)
ERYTHROCYTE [DISTWIDTH] IN BLOOD BY AUTOMATED COUNT: 13 % (ref 11.6–15.1)
FERRITIN SERPL-MCNC: 129 NG/ML (ref 11–307)
HCT VFR BLD AUTO: 46.4 % (ref 34.8–46.1)
HGB BLD-MCNC: 15.6 G/DL (ref 11.5–15.4)
IMM GRANULOCYTES # BLD AUTO: 0.07 THOUSAND/UL (ref 0–0.2)
IMM GRANULOCYTES NFR BLD AUTO: 1 % (ref 0–2)
IRON SATN MFR SERPL: 23 % (ref 15–50)
IRON SERPL-MCNC: 81 UG/DL (ref 50–212)
LYMPHOCYTES # BLD AUTO: 3.21 THOUSANDS/ΜL (ref 0.6–4.47)
LYMPHOCYTES NFR BLD AUTO: 28 % (ref 14–44)
MCH RBC QN AUTO: 32.5 PG (ref 26.8–34.3)
MCHC RBC AUTO-ENTMCNC: 33.6 G/DL (ref 31.4–37.4)
MCV RBC AUTO: 97 FL (ref 82–98)
MONOCYTES # BLD AUTO: 1.22 THOUSAND/ΜL (ref 0.17–1.22)
MONOCYTES NFR BLD AUTO: 11 % (ref 4–12)
NEUTROPHILS # BLD AUTO: 6.78 THOUSANDS/ΜL (ref 1.85–7.62)
NEUTS SEG NFR BLD AUTO: 57 % (ref 43–75)
NRBC BLD AUTO-RTO: 0 /100 WBCS
PLATELET # BLD AUTO: 415 THOUSANDS/UL (ref 149–390)
PMV BLD AUTO: 9.1 FL (ref 8.9–12.7)
RBC # BLD AUTO: 4.8 MILLION/UL (ref 3.81–5.12)
TIBC SERPL-MCNC: 347.2 UG/DL (ref 250–450)
TRANSFERRIN SERPL-MCNC: 248 MG/DL (ref 203–362)
UIBC SERPL-MCNC: 266 UG/DL (ref 155–355)
WBC # BLD AUTO: 11.64 THOUSAND/UL (ref 4.31–10.16)

## 2025-01-09 PROCEDURE — 82728 ASSAY OF FERRITIN: CPT

## 2025-01-09 PROCEDURE — 87340 HEPATITIS B SURFACE AG IA: CPT

## 2025-01-09 PROCEDURE — 96375 TX/PRO/DX INJ NEW DRUG ADDON: CPT

## 2025-01-09 PROCEDURE — 96365 THER/PROPH/DIAG IV INF INIT: CPT

## 2025-01-09 PROCEDURE — 96367 TX/PROPH/DG ADDL SEQ IV INF: CPT

## 2025-01-09 PROCEDURE — 86704 HEP B CORE ANTIBODY TOTAL: CPT

## 2025-01-09 PROCEDURE — 83550 IRON BINDING TEST: CPT

## 2025-01-09 PROCEDURE — 96366 THER/PROPH/DIAG IV INF ADDON: CPT

## 2025-01-09 PROCEDURE — 86140 C-REACTIVE PROTEIN: CPT

## 2025-01-09 PROCEDURE — 83540 ASSAY OF IRON: CPT

## 2025-01-09 PROCEDURE — 86803 HEPATITIS C AB TEST: CPT

## 2025-01-09 PROCEDURE — 86705 HEP B CORE ANTIBODY IGM: CPT

## 2025-01-09 PROCEDURE — 86480 TB TEST CELL IMMUN MEASURE: CPT

## 2025-01-09 PROCEDURE — 85025 COMPLETE CBC W/AUTO DIFF WBC: CPT

## 2025-01-09 RX ORDER — METHYLPREDNISOLONE SODIUM SUCCINATE 40 MG/ML
40 INJECTION, POWDER, LYOPHILIZED, FOR SOLUTION INTRAMUSCULAR; INTRAVENOUS ONCE
Status: COMPLETED | OUTPATIENT
Start: 2025-01-09 | End: 2025-01-09

## 2025-01-09 RX ORDER — SODIUM CHLORIDE, SODIUM GLUCONATE, SODIUM ACETATE, POTASSIUM CHLORIDE, MAGNESIUM CHLORIDE, SODIUM PHOSPHATE, DIBASIC, AND POTASSIUM PHOSPHATE .53; .5; .37; .037; .03; .012; .00082 G/100ML; G/100ML; G/100ML; G/100ML; G/100ML; G/100ML; G/100ML
1000 INJECTION, SOLUTION INTRAVENOUS ONCE
Status: COMPLETED | OUTPATIENT
Start: 2025-01-09 | End: 2025-01-09

## 2025-01-09 RX ORDER — FAMOTIDINE 40 MG/1
40 TABLET, FILM COATED ORAL DAILY
Qty: 90 TABLET | Refills: 1 | Status: SHIPPED | OUTPATIENT
Start: 2025-01-09

## 2025-01-09 RX ORDER — METHYLPREDNISOLONE SODIUM SUCCINATE 40 MG/ML
40 INJECTION, POWDER, LYOPHILIZED, FOR SOLUTION INTRAMUSCULAR; INTRAVENOUS ONCE
Status: CANCELLED
Start: 2025-01-14 | End: 2025-01-10

## 2025-01-09 RX ORDER — SODIUM CHLORIDE, SODIUM GLUCONATE, SODIUM ACETATE, POTASSIUM CHLORIDE, MAGNESIUM CHLORIDE, SODIUM PHOSPHATE, DIBASIC, AND POTASSIUM PHOSPHATE .53; .5; .37; .037; .03; .012; .00082 G/100ML; G/100ML; G/100ML; G/100ML; G/100ML; G/100ML; G/100ML
1000 INJECTION, SOLUTION INTRAVENOUS ONCE
Status: CANCELLED
Start: 2025-01-10 | End: 2025-01-10

## 2025-01-09 RX ADMIN — SODIUM CHLORIDE, SODIUM GLUCONATE, SODIUM ACETATE, POTASSIUM CHLORIDE, MAGNESIUM CHLORIDE, SODIUM PHOSPHATE, DIBASIC, AND POTASSIUM PHOSPHATE 1000 ML: .53; .5; .37; .037; .03; .012; .00082 INJECTION, SOLUTION INTRAVENOUS at 10:32

## 2025-01-09 RX ADMIN — DIPHENHYDRAMINE HYDROCHLORIDE 50 MG: 50 INJECTION, SOLUTION INTRAMUSCULAR; INTRAVENOUS at 09:32

## 2025-01-09 RX ADMIN — FAMOTIDINE 40 MG: 10 INJECTION, SOLUTION INTRAVENOUS at 09:55

## 2025-01-09 RX ADMIN — METHYLPREDNISOLONE SODIUM SUCCINATE 40 MG: 40 INJECTION, POWDER, FOR SOLUTION INTRAMUSCULAR; INTRAVENOUS at 10:26

## 2025-01-09 NOTE — PROGRESS NOTES
Patient tolerated Isolyte IV hydration without issues.      Celine Roa is aware of future appt on 1/14/25 at 0800.     AVS -  No (Declined by Celine Roa)     Patient ambulated off unit without incident.  All personal belongings taken with patient.

## 2025-01-10 ENCOUNTER — RESULTS FOLLOW-UP (OUTPATIENT)
Dept: GASTROENTEROLOGY | Facility: CLINIC | Age: 50
End: 2025-01-10

## 2025-01-10 LAB
GAMMA INTERFERON BACKGROUND BLD IA-ACNC: 0.01 IU/ML
HBV CORE AB SER QL: NORMAL
HBV CORE IGM SER QL: NORMAL
HBV SURFACE AG SER QL: NORMAL
HCV AB SER QL: NORMAL
M TB IFN-G BLD-IMP: NEGATIVE
M TB IFN-G CD4+ BCKGRND COR BLD-ACNC: 0 IU/ML
M TB IFN-G CD4+ BCKGRND COR BLD-ACNC: 0 IU/ML
MITOGEN IGNF BCKGRD COR BLD-ACNC: 9.99 IU/ML

## 2025-01-10 RX ORDER — SODIUM CHLORIDE, SODIUM GLUCONATE, SODIUM ACETATE, POTASSIUM CHLORIDE, MAGNESIUM CHLORIDE, SODIUM PHOSPHATE, DIBASIC, AND POTASSIUM PHOSPHATE .53; .5; .37; .037; .03; .012; .00082 G/100ML; G/100ML; G/100ML; G/100ML; G/100ML; G/100ML; G/100ML
1500 INJECTION, SOLUTION INTRAVENOUS ONCE
Status: CANCELLED
Start: 2025-01-14 | End: 2025-01-10

## 2025-01-10 NOTE — TELEPHONE ENCOUNTER
Called patient, reached voicemail, mailbox full, unable to leave a message to discuss Dr. Levine's message and make a f/u appointment with another provider due to Dr. Levine transition.

## 2025-01-13 ENCOUNTER — TELEPHONE (OUTPATIENT)
Dept: GASTROENTEROLOGY | Facility: CLINIC | Age: 50
End: 2025-01-13

## 2025-01-13 NOTE — TELEPHONE ENCOUNTER
----- Message -----   From: Yehuda Levine MD   Sent: 1/10/2025  12:53 PM EST   To: Daria Young RN; [facility]     Hi,     Quant gold and hepatitis panel came back negative.  Can we get her started on Entyvio soon as possible?     Thank you!

## 2025-01-14 ENCOUNTER — HOSPITAL ENCOUNTER (OUTPATIENT)
Dept: INFUSION CENTER | Facility: HOSPITAL | Age: 50
Discharge: HOME | End: 2025-01-14
Attending: INTERNAL MEDICINE
Payer: COMMERCIAL

## 2025-01-14 VITALS
RESPIRATION RATE: 16 BRPM | OXYGEN SATURATION: 98 % | HEART RATE: 94 BPM | SYSTOLIC BLOOD PRESSURE: 121 MMHG | DIASTOLIC BLOOD PRESSURE: 78 MMHG | TEMPERATURE: 95.9 F

## 2025-01-14 DIAGNOSIS — E44.1 MILD PROTEIN-CALORIE MALNUTRITION (HCC): ICD-10-CM

## 2025-01-14 DIAGNOSIS — R11.10 VOMITING AND DIARRHEA: ICD-10-CM

## 2025-01-14 DIAGNOSIS — D50.0 IRON DEFICIENCY ANEMIA DUE TO CHRONIC BLOOD LOSS: Primary | ICD-10-CM

## 2025-01-14 DIAGNOSIS — R19.7 VOMITING AND DIARRHEA: ICD-10-CM

## 2025-01-14 DIAGNOSIS — R63.0 ANOREXIA: ICD-10-CM

## 2025-01-14 DIAGNOSIS — E61.1 IRON DEFICIENCY: ICD-10-CM

## 2025-01-14 RX ORDER — METHYLPREDNISOLONE SODIUM SUCCINATE 40 MG/ML
40 INJECTION, POWDER, LYOPHILIZED, FOR SOLUTION INTRAMUSCULAR; INTRAVENOUS ONCE
Status: COMPLETED | OUTPATIENT
Start: 2025-01-14 | End: 2025-01-14

## 2025-01-14 RX ORDER — SODIUM CHLORIDE, SODIUM GLUCONATE, SODIUM ACETATE, POTASSIUM CHLORIDE, MAGNESIUM CHLORIDE, SODIUM PHOSPHATE, DIBASIC, AND POTASSIUM PHOSPHATE .53; .5; .37; .037; .03; .012; .00082 G/100ML; G/100ML; G/100ML; G/100ML; G/100ML; G/100ML; G/100ML
1500 INJECTION, SOLUTION INTRAVENOUS ONCE
Status: CANCELLED
Start: 2025-01-16 | End: 2025-01-15

## 2025-01-14 RX ORDER — METHYLPREDNISOLONE SODIUM SUCCINATE 40 MG/ML
40 INJECTION, POWDER, LYOPHILIZED, FOR SOLUTION INTRAMUSCULAR; INTRAVENOUS ONCE
Status: CANCELLED
Start: 2025-01-16 | End: 2025-01-15

## 2025-01-14 RX ORDER — SODIUM CHLORIDE, SODIUM GLUCONATE, SODIUM ACETATE, POTASSIUM CHLORIDE, MAGNESIUM CHLORIDE, SODIUM PHOSPHATE, DIBASIC, AND POTASSIUM PHOSPHATE .53; .5; .37; .037; .03; .012; .00082 G/100ML; G/100ML; G/100ML; G/100ML; G/100ML; G/100ML; G/100ML
1500 INJECTION, SOLUTION INTRAVENOUS ONCE
Status: COMPLETED | OUTPATIENT
Start: 2025-01-14 | End: 2025-01-14

## 2025-01-14 RX ADMIN — DIPHENHYDRAMINE HYDROCHLORIDE 50 MG: 50 INJECTION, SOLUTION INTRAMUSCULAR; INTRAVENOUS at 09:15

## 2025-01-14 RX ADMIN — FAMOTIDINE 40 MG: 10 INJECTION, SOLUTION INTRAVENOUS at 09:54

## 2025-01-14 RX ADMIN — METHYLPREDNISOLONE SODIUM SUCCINATE 40 MG: 40 INJECTION, POWDER, FOR SOLUTION INTRAMUSCULAR; INTRAVENOUS at 10:28

## 2025-01-14 RX ADMIN — SODIUM CHLORIDE, SODIUM GLUCONATE, SODIUM ACETATE, POTASSIUM CHLORIDE, MAGNESIUM CHLORIDE, SODIUM PHOSPHATE, DIBASIC, AND POTASSIUM PHOSPHATE 1000 ML: .53; .5; .37; .037; .03; .012; .00082 INJECTION, SOLUTION INTRAVENOUS at 10:30

## 2025-01-14 NOTE — PLAN OF CARE
Problem: Knowledge Deficit  Goal: Patient/family/caregiver demonstrates understanding of disease process, treatment plan, medications, and discharge instructions  Description: Complete learning assessment and assess knowledge base.  Interventions:  - Provide teaching at level of understanding  - Provide teaching via preferred learning methods  1/14/2025 1336 by Juanita Sharp RN  Outcome: Progressing  1/14/2025 1122 by Juanita Sharp RN  Outcome: Progressing

## 2025-01-14 NOTE — TELEPHONE ENCOUNTER
Called patient, reached voicemail, left message to call back to schedule f/u appointment, unfortunately not with current provider and to call back and ask for Ana Rosa.

## 2025-01-14 NOTE — PROGRESS NOTES
Celine Roa tolerated hydration well with no complications.      Celine Roa is aware of future appt on 1/16/25 at 0800.     AVS declined.

## 2025-01-15 NOTE — TELEPHONE ENCOUNTER
RYAN     Received status on prior authorization.  [facility] infusion center is a non-preferred site of care. Will send referral to [facility] since home infusion is an option.

## 2025-01-16 ENCOUNTER — TELEPHONE (OUTPATIENT)
Dept: GASTROENTEROLOGY | Facility: CLINIC | Age: 50
End: 2025-01-16

## 2025-01-16 ENCOUNTER — HOSPITAL ENCOUNTER (OUTPATIENT)
Dept: INFUSION CENTER | Facility: HOSPITAL | Age: 50
Discharge: HOME | End: 2025-01-16
Attending: INTERNAL MEDICINE
Payer: COMMERCIAL

## 2025-01-16 VITALS
RESPIRATION RATE: 18 BRPM | HEART RATE: 96 BPM | SYSTOLIC BLOOD PRESSURE: 111 MMHG | TEMPERATURE: 96.2 F | DIASTOLIC BLOOD PRESSURE: 77 MMHG

## 2025-01-16 DIAGNOSIS — E61.1 IRON DEFICIENCY: ICD-10-CM

## 2025-01-16 DIAGNOSIS — R63.0 ANOREXIA: ICD-10-CM

## 2025-01-16 DIAGNOSIS — R19.7 VOMITING AND DIARRHEA: ICD-10-CM

## 2025-01-16 DIAGNOSIS — E44.1 MILD PROTEIN-CALORIE MALNUTRITION (HCC): ICD-10-CM

## 2025-01-16 DIAGNOSIS — R11.10 VOMITING AND DIARRHEA: ICD-10-CM

## 2025-01-16 DIAGNOSIS — D50.0 IRON DEFICIENCY ANEMIA DUE TO CHRONIC BLOOD LOSS: Primary | ICD-10-CM

## 2025-01-16 PROCEDURE — 96365 THER/PROPH/DIAG IV INF INIT: CPT

## 2025-01-16 PROCEDURE — 96375 TX/PRO/DX INJ NEW DRUG ADDON: CPT

## 2025-01-16 PROCEDURE — 96366 THER/PROPH/DIAG IV INF ADDON: CPT

## 2025-01-16 PROCEDURE — 96367 TX/PROPH/DG ADDL SEQ IV INF: CPT

## 2025-01-16 RX ORDER — METHYLPREDNISOLONE SODIUM SUCCINATE 40 MG/ML
40 INJECTION, POWDER, LYOPHILIZED, FOR SOLUTION INTRAMUSCULAR; INTRAVENOUS ONCE
Status: CANCELLED
Start: 2025-01-20 | End: 2025-01-17

## 2025-01-16 RX ORDER — SODIUM CHLORIDE, SODIUM GLUCONATE, SODIUM ACETATE, POTASSIUM CHLORIDE, MAGNESIUM CHLORIDE, SODIUM PHOSPHATE, DIBASIC, AND POTASSIUM PHOSPHATE .53; .5; .37; .037; .03; .012; .00082 G/100ML; G/100ML; G/100ML; G/100ML; G/100ML; G/100ML; G/100ML
1500 INJECTION, SOLUTION INTRAVENOUS ONCE
Status: CANCELLED
Start: 2025-01-20 | End: 2025-01-17

## 2025-01-16 RX ORDER — SODIUM CHLORIDE, SODIUM GLUCONATE, SODIUM ACETATE, POTASSIUM CHLORIDE, MAGNESIUM CHLORIDE, SODIUM PHOSPHATE, DIBASIC, AND POTASSIUM PHOSPHATE .53; .5; .37; .037; .03; .012; .00082 G/100ML; G/100ML; G/100ML; G/100ML; G/100ML; G/100ML; G/100ML
1500 INJECTION, SOLUTION INTRAVENOUS ONCE
Status: CANCELLED
Start: 2025-01-17 | End: 2025-01-17

## 2025-01-16 RX ORDER — METHYLPREDNISOLONE SODIUM SUCCINATE 40 MG/ML
40 INJECTION, POWDER, LYOPHILIZED, FOR SOLUTION INTRAMUSCULAR; INTRAVENOUS ONCE
Status: COMPLETED | OUTPATIENT
Start: 2025-01-16 | End: 2025-01-16

## 2025-01-16 RX ORDER — SODIUM CHLORIDE, SODIUM GLUCONATE, SODIUM ACETATE, POTASSIUM CHLORIDE, MAGNESIUM CHLORIDE, SODIUM PHOSPHATE, DIBASIC, AND POTASSIUM PHOSPHATE .53; .5; .37; .037; .03; .012; .00082 G/100ML; G/100ML; G/100ML; G/100ML; G/100ML; G/100ML; G/100ML
1500 INJECTION, SOLUTION INTRAVENOUS ONCE
Status: COMPLETED | OUTPATIENT
Start: 2025-01-16 | End: 2025-01-16

## 2025-01-16 RX ADMIN — SODIUM CHLORIDE, SODIUM GLUCONATE, SODIUM ACETATE, POTASSIUM CHLORIDE, MAGNESIUM CHLORIDE, SODIUM PHOSPHATE, DIBASIC, AND POTASSIUM PHOSPHATE 1500 ML: .53; .5; .37; .037; .03; .012; .00082 INJECTION, SOLUTION INTRAVENOUS at 09:18

## 2025-01-16 RX ADMIN — METHYLPREDNISOLONE SODIUM SUCCINATE 40 MG: 40 INJECTION, POWDER, FOR SOLUTION INTRAMUSCULAR; INTRAVENOUS at 09:16

## 2025-01-16 RX ADMIN — FAMOTIDINE 40 MG: 10 INJECTION, SOLUTION INTRAVENOUS at 08:46

## 2025-01-16 RX ADMIN — DIPHENHYDRAMINE HYDROCHLORIDE 50 MG: 50 INJECTION, SOLUTION INTRAMUSCULAR; INTRAVENOUS at 08:23

## 2025-01-16 NOTE — LETTER
January 17, 2025    Regarding:  Celine Roa  [address] [id number]    Letter of Medical Necessity for infusions at [facility]    To whom it may concern:    Ms. Roa is currently under my care for her complex medical needs. She is a 49-year-old woman with hereditary alpha tryptase, Hashimoto's, inflammatory bowel disease thought to be Crohn's, mast cell activation syndrome and documented corn allergy diagnosed at [facility] and [facility], multiple medication failures for her Crohn's who has been receiving infusions, hydration and pre-medications given her risk and personal history of anaphylaxis and allergic reactions. We are requesting that all future infusions can continue to be at a hospital based system so she can be properly monitored.       If you have any questions or concerns, please don't hesitate to call.    Sincerely,             Tanisha Hinkle MA        CC: Tanisha Hinkle MA

## 2025-01-16 NOTE — PROGRESS NOTES
Celine Roa  tolerated IV hydration well with no complications. Per Dr Levine, ok to administer 1500mL isolyte over 3 hours (500 ml/hr) per pt preference.    Celine Roa is aware of future appt on 1/20 at 9AM.     AVS declined by Celine Roa.

## 2025-01-16 NOTE — TELEPHONE ENCOUNTER
From  Celine Roa  P [facility] (supporting Tanisha Hinkle MA) Sent  1/15/2025  9:37 PM     I need to have infusions done at a medical facility due to my mast cell activation disorder and corn allergy as I am high risk anaphylaxis and need to get IV pre meds and hydration prior to infusion to avoid/prevent reactions.   Please submit a special request due to my rare medical condition     Previous Messages    ----- Message -----       From:Tanisha BUITRAGO       Sent:1/15/2025  4:54 PM EST         To:Celine Roa    Subject:Entyvio Authorization    Robert Berger,    I received status on prior authorization.  [facility] infusion center is a non-preferred site of care. I will send a referral to [facility] home infusion services since this a preferred site of care per your insurance.      Thank you,    Tanisha BUITRAGO    Prior authorization specialist

## 2025-01-16 NOTE — TELEPHONE ENCOUNTER
Hi Dr. Levine,    Can you please create a letter of necessity for the patient?  She needs to have her infusions at the [facility] due to her current condition with mass cell and corn allergy. I will re submit again with the letter of necessity.      Thank you.

## 2025-01-21 ENCOUNTER — OFFICE VISIT (OUTPATIENT)
Dept: PSYCHIATRY | Facility: CLINIC | Age: 50
End: 2025-01-21
Payer: COMMERCIAL

## 2025-01-21 DIAGNOSIS — D50.0 IRON DEFICIENCY ANEMIA DUE TO CHRONIC BLOOD LOSS: ICD-10-CM

## 2025-01-21 DIAGNOSIS — E61.1 IRON DEFICIENCY: ICD-10-CM

## 2025-01-21 DIAGNOSIS — R11.10 VOMITING AND DIARRHEA: ICD-10-CM

## 2025-01-21 DIAGNOSIS — F41.1 GENERALIZED ANXIETY DISORDER: Primary | ICD-10-CM

## 2025-01-21 DIAGNOSIS — R63.0 ANOREXIA: Primary | ICD-10-CM

## 2025-01-21 DIAGNOSIS — R19.7 VOMITING AND DIARRHEA: ICD-10-CM

## 2025-01-21 DIAGNOSIS — F33.42 RECURRENT MAJOR DEPRESSIVE DISORDER, IN FULL REMISSION (HCC): ICD-10-CM

## 2025-01-21 DIAGNOSIS — E44.1 MILD PROTEIN-CALORIE MALNUTRITION (HCC): ICD-10-CM

## 2025-01-21 PROCEDURE — 90833 PSYTX W PT W E/M 30 MIN: CPT | Performed by: STUDENT IN AN ORGANIZED HEALTH CARE EDUCATION/TRAINING PROGRAM

## 2025-01-21 PROCEDURE — 99213 OFFICE O/P EST LOW 20 MIN: CPT | Performed by: STUDENT IN AN ORGANIZED HEALTH CARE EDUCATION/TRAINING PROGRAM

## 2025-01-21 RX ORDER — METHYLPREDNISOLONE SODIUM SUCCINATE 40 MG/ML
40 INJECTION, POWDER, LYOPHILIZED, FOR SOLUTION INTRAMUSCULAR; INTRAVENOUS ONCE
Start: 2025-01-23 | End: 2025-01-21

## 2025-01-21 RX ORDER — SODIUM CHLORIDE, SODIUM GLUCONATE, SODIUM ACETATE, POTASSIUM CHLORIDE, MAGNESIUM CHLORIDE, SODIUM PHOSPHATE, DIBASIC, AND POTASSIUM PHOSPHATE .53; .5; .37; .037; .03; .012; .00082 G/100ML; G/100ML; G/100ML; G/100ML; G/100ML; G/100ML; G/100ML
1500 INJECTION, SOLUTION INTRAVENOUS ONCE
Start: 2025-01-23 | End: 2025-01-21

## 2025-01-22 NOTE — PSYCH
"MEDICATION MANAGEMENT NOTE        [facility]      Name and Date of Birth:  Celine Roa 49 y.o. 1975 MRN: [id number]    Date of Visit: January 21, 2025    Visit Time    Visit Start Time: 1530  Visit Stop Time: 1615  Total Visit Duration:  45 minutes      Reason for Visit: Follow-up visit regarding medication management     Chief Complaint: \"My mom said some things that upset me.\"    Assessment/Plan:   Assessment & Plan  Generalized anxiety disorder  Advised to follow-up with outpatient therapist.        Recurrent major depressive disorder, in full remission (HCC)  .  Depression remains in remission           Treatment Recommendations/Precautions:    Continue follow-up with outpatient medical providers for multiple comorbid conditions.  Aware of 24 hour and weekend coverage for urgent situations accessed by calling [facility] main practice number  The following interventions are recommended: continue medication management, contracts for safety at present - agrees to go to ED if feeling unsafe, contracts for safety at present - agrees to call Crisis Intervention Service if feeling unsafe    Medications Risks/Benefits:      Risks, Benefits And Possible Side Effects Of Medications:    Risks, benefits, and possible side effects of medications explained to Celine and she verbalizes understanding and agreement for treatment.    Controlled Medication Discussion:     Not applicable      SUBJECTIVE:    Celine Roa is a 49 y.o., female, possessing a past psychiatric history significant for depression and anxiety, medically complicated by mast cell disease, multiple allergies, and Crohn's disease, who was personally seen and evaluated at the [facility] outpatient clinic for follow-up regarding medication management. Celine states that since their previous outpatient psychiatric appointment, she is feeling \"okay \".  States that she " "has had some health issues off and on since her last appointment, he is waiting to get more infusions from gastroenterology.  States she is back on prednisone for her Crohn's disease, which does make her feel uncomfortable physically.  States she is trying to stay active, has been volunteering in [place], and finds this a very positive experience for her.  She states she has met multiple people and made friends while volunteering there.  States that her  has also gone down with her at times.  States he is doing okay, still struggles with some depressive symptoms himself, and has issues at work.  She states that she tries to stay positive and be supportive of him.  She states that [place] was somewhat challenging for her, as she found her mother made a lot of comments and said \"mean things \".  Patient states that her mother was saying that Celine was not a good enough mother to her children, and Celine admits that this is something she worried about herself as she was working during her children's childhood.  She states that she does feel very frustrated with her mother, and she is agreeable to going to therapy.  Patient states that she does not feel she has had worsening depression, and does not want to restart medication at this time.    Current Rating Scores:   None completed today.    Psychiatric Review Of Systems:    Appetite: no  Adverse eating: no  Weight changes: no  Insomnia/sleeplessness: no  Fatigue/anergy: no  Anhedonia/lack of interest: no  Attention/concentration: no  Psychomotor agitation/retardation: no  Somatic symptoms: no  Anxiety/panic attack: Some worrying at times  Kendal/hypomania: no  Hopelessness/helplessness/worthlessness: no  Self-injurious behavior/high-risk behavior: no  Suicidal ideation: no  Homicidal ideation: no  Auditory hallucinations: no  Visual hallucinations: no  Other perceptual disturbances: no  Delusional thinking: no  Obsessive/compulsive symptoms: no    Review Of " Systems:      Constitutional negative   ENT negative   Cardiovascular negative   Respiratory negative   Gastrointestinal negative   Genitourinary negative   Musculoskeletal negative   Integumentary negative   Neurological negative   Endocrine negative   Other Symptoms none, all other systems are negative     History Review:   The following portions of the patient's history were reviewed and updated as appropriate: allergies, current medications, past family history, past medical history, past social history, past surgical history, and problem list..         OBJECTIVE:     Vital signs in last 24 hours:    There were no vitals filed for this visit.    Mental Status Evaluation:    Appearance age appropriate, casually dressed   Behavior cooperative, calm   Speech normal rate, normal volume, normal pitch   Mood euthymic, though somewhat sad and anxious when talking about her mother   Affect normal range and intensity, appropriate   Thought Processes organized, goal directed   Associations intact associations   Thought Content no overt delusions   Perceptual Disturbances: no auditory hallucinations, no visual hallucinations   Abnormal Thoughts  Risk Potential Suicidal ideation - None  Homicidal ideation - None  Potential for aggression - No   Orientation oriented to person, place, time/date, and situation   Memory recent and remote memory grossly intact   Consciousness alert and awake   Attention Span Concentration Span attention span and concentration are age appropriate   Intellect appears to be of average intelligence   Insight intact   Judgement intact   Muscle Strength and  Gait normal muscle strength and normal muscle tone, normal gait and normal balance   Motor activity no abnormal movements   Fund of Knowledge adequate knowledge of current events  adequate fund of knowledge regarding past history  adequate fund of knowledge regarding vocabulary    Pain none   Pain Scale 0       Laboratory Results: I have  personally reviewed all pertinent laboratory/tests results    Recent Labs:   Hospital Outpatient Visit on 01/09/2025   Component Date Value    WBC 01/09/2025 11.64 (H)     RBC 01/09/2025 4.80     Hemoglobin 01/09/2025 15.6 (H)     Hematocrit 01/09/2025 46.4 (H)     MCV 01/09/2025 97     MCH 01/09/2025 32.5     MCHC 01/09/2025 33.6     RDW 01/09/2025 13.0     MPV 01/09/2025 9.1     Platelets 01/09/2025 415 (H)     nRBC 01/09/2025 0     Segmented % 01/09/2025 57     Immature Grans % 01/09/2025 1     Lymphocytes % 01/09/2025 28     Monocytes % 01/09/2025 11     Eosinophils Relative 01/09/2025 2     Basophils Relative 01/09/2025 1     Absolute Neutrophils 01/09/2025 6.78     Absolute Immature Grans 01/09/2025 0.07     Absolute Lymphocytes 01/09/2025 3.21     Absolute Monocytes 01/09/2025 1.22     Eosinophils Absolute 01/09/2025 0.24     Basophils Absolute 01/09/2025 0.12 (H)     CRP 01/09/2025 4.2 (H)     Hepatitis B Surface Ag 01/09/2025 Non-reactive     Hepatitis C Ab 01/09/2025 Non-reactive     Hep B C IgM 01/09/2025 Non-reactive     Hep B Core Total Ab 01/09/2025 Non-reactive     QFT Nil 01/09/2025 0.01     QFT TB1-NIL 01/09/2025 0.00     QFT TB2-NIL 01/09/2025 0.00     QFT Mitogen-NIL 01/09/2025 9.99     QFT Final Interpretation 01/09/2025 Negative     Iron Saturation 01/09/2025 23     TIBC 01/09/2025 347.2     Iron 01/09/2025 81     Transferrin 01/09/2025 248     UIBC 01/09/2025 266     Ferritin 01/09/2025 129        Suicide/Homicide Risk Assessment:    The following interventions are recommended: continue medication management, contracts for safety at present - agrees to go to ED if feeling unsafe, contracts for safety at present - agrees to call Crisis Intervention Service if feeling unsafe. Although patient's acute lethality risk is low, long-term/chronic lethality risk is mildly elevated in the presence of depression. At the current moment, Celine is future-oriented, forward-thinking, and demonstrates ability to  act in a self-preserving manner as evidenced by volitionally presenting to the clinic today, seeking treatment. To mitigate future risk, patient should adhere to the recommendations below, avoid alcohol/illicit substance use, utilize community-based resources and familiar support and prioritize mental health treatment. Presently, patient denies active suicidal/homicidal ideation in addition to thoughts of self-injury; contracts for safety.  At conclusion of evaluation, patient is amenable to the recommendations below. Patient is amenable to calling/contacting the outpatient office including this writer if any acute adverse effects of their medication regimen arise in addition to any comments or concerns pertaining to their psychiatric management.  Patient is amenable to calling/contacting crisis and/or attending to the nearest emergency department if their clinical condition deteriorates to assure their safety and stability, stating that they are able to appropriately confide in their  regarding their psychiatric state.      Psychotherapy Provided:     Individual psychotherapy provided: Yes  Counseling was provided during the session today for 25 minutes.  Goals discussed during in session: continue improvement in anxiety and control mood stability.   Recent stressor including  holidays and mother's comments  discussed with Celine.   Reassurance and supportive therapy provided.      Treatment Plan:    Completed and signed during the session: Not applicable - Treatment Plan to be completed by [facility] therapist    This note was not shared with the patient due to this is a psychotherapy note      Diogenes Lewis DO 01/21/25

## 2025-01-27 ENCOUNTER — OFFICE VISIT (OUTPATIENT)
Dept: FAMILY MEDICINE CLINIC | Facility: HOME HEALTHCARE | Age: 50
End: 2025-01-27
Payer: COMMERCIAL

## 2025-01-27 ENCOUNTER — TELEPHONE (OUTPATIENT)
Age: 50
End: 2025-01-27

## 2025-01-27 VITALS
SYSTOLIC BLOOD PRESSURE: 128 MMHG | OXYGEN SATURATION: 98 % | DIASTOLIC BLOOD PRESSURE: 70 MMHG | WEIGHT: 164.4 LBS | HEIGHT: 65 IN | RESPIRATION RATE: 18 BRPM | BODY MASS INDEX: 27.39 KG/M2 | TEMPERATURE: 98 F | HEART RATE: 93 BPM

## 2025-01-27 DIAGNOSIS — K50.119 CROHN'S DISEASE OF COLON WITH COMPLICATION (HCC): ICD-10-CM

## 2025-01-27 DIAGNOSIS — K51.919 ULCERATIVE COLITIS WITH COMPLICATION, UNSPECIFIED LOCATION (HCC): ICD-10-CM

## 2025-01-27 DIAGNOSIS — D84.9 IMMUNOCOMPROMISED (HCC): ICD-10-CM

## 2025-01-27 DIAGNOSIS — D89.40 MAST CELL ACTIVATION SYNDROME (HCC): ICD-10-CM

## 2025-01-27 DIAGNOSIS — D83.9 COMMON VARIABLE IMMUNODEFICIENCY (HCC): ICD-10-CM

## 2025-01-27 DIAGNOSIS — F32.3 MAJOR DEPRESSIVE DISORDER WITH PSYCHOTIC FEATURES (HCC): ICD-10-CM

## 2025-01-27 DIAGNOSIS — G43.919 INTRACTABLE MIGRAINE WITHOUT STATUS MIGRAINOSUS, UNSPECIFIED MIGRAINE TYPE: Primary | ICD-10-CM

## 2025-01-27 PROCEDURE — 99214 OFFICE O/P EST MOD 30 MIN: CPT | Performed by: FAMILY MEDICINE

## 2025-01-27 RX ORDER — PREDNISONE 20 MG/1
TABLET ORAL
Qty: 100 TABLET | Refills: 0 | Status: SHIPPED | OUTPATIENT
Start: 2025-01-27

## 2025-01-27 NOTE — TELEPHONE ENCOUNTER
I called pt and schedule a f/u appt with Dr. Levine on 01/29 at 11:00 Am, [facility] and requested call back if she have any questions.
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
## 2025-01-27 NOTE — ASSESSMENT & PLAN NOTE
Orders:    predniSONE 20 mg tablet; Take 40mg per day for 1 week, then 30mg per day for 1 week, then 20mg per day for 1 week and then 10mg per day for 1 week.

## 2025-01-27 NOTE — TELEPHONE ENCOUNTER
Patients GI provider:  Dr. Levine     Number to return call: [phone]    Reason for call: Pt calling because she would like an appt to see  before he leave [facility]. She has a complicated med history and is currently dealing with medication switches and she only wants to see him right now but I have nothing on my schedule available. She asked if I could send him this request to see if he could try to fit her in. Please advise.

## 2025-01-28 ENCOUNTER — HOSPITAL ENCOUNTER (OUTPATIENT)
Dept: INFUSION CENTER | Facility: HOSPITAL | Age: 50
Discharge: HOME | End: 2025-01-28
Attending: INTERNAL MEDICINE
Payer: COMMERCIAL

## 2025-01-28 VITALS
OXYGEN SATURATION: 98 % | DIASTOLIC BLOOD PRESSURE: 88 MMHG | HEART RATE: 88 BPM | TEMPERATURE: 96 F | SYSTOLIC BLOOD PRESSURE: 123 MMHG | RESPIRATION RATE: 16 BRPM

## 2025-01-28 DIAGNOSIS — E61.1 IRON DEFICIENCY: ICD-10-CM

## 2025-01-28 DIAGNOSIS — R63.0 ANOREXIA: ICD-10-CM

## 2025-01-28 DIAGNOSIS — D50.0 IRON DEFICIENCY ANEMIA DUE TO CHRONIC BLOOD LOSS: ICD-10-CM

## 2025-01-28 DIAGNOSIS — L50.1 CHRONIC IDIOPATHIC URTICARIA: ICD-10-CM

## 2025-01-28 DIAGNOSIS — R11.10 VOMITING AND DIARRHEA: Primary | ICD-10-CM

## 2025-01-28 DIAGNOSIS — L50.1 CHRONIC IDIOPATHIC URTICARIA: Primary | ICD-10-CM

## 2025-01-28 DIAGNOSIS — R19.7 VOMITING AND DIARRHEA: Primary | ICD-10-CM

## 2025-01-28 DIAGNOSIS — E44.1 MILD PROTEIN-CALORIE MALNUTRITION (HCC): ICD-10-CM

## 2025-01-28 DIAGNOSIS — R19.7 VOMITING AND DIARRHEA: ICD-10-CM

## 2025-01-28 DIAGNOSIS — R11.10 VOMITING AND DIARRHEA: ICD-10-CM

## 2025-01-28 PROCEDURE — 96365 THER/PROPH/DIAG IV INF INIT: CPT

## 2025-01-28 PROCEDURE — 96367 TX/PROPH/DG ADDL SEQ IV INF: CPT

## 2025-01-28 PROCEDURE — 96375 TX/PRO/DX INJ NEW DRUG ADDON: CPT

## 2025-01-28 PROCEDURE — 96372 THER/PROPH/DIAG INJ SC/IM: CPT

## 2025-01-28 PROCEDURE — 96366 THER/PROPH/DIAG IV INF ADDON: CPT

## 2025-01-28 RX ORDER — METHYLPREDNISOLONE SODIUM SUCCINATE 40 MG/ML
40 INJECTION, POWDER, LYOPHILIZED, FOR SOLUTION INTRAMUSCULAR; INTRAVENOUS ONCE
Status: CANCELLED
Start: 2025-01-30 | End: 2025-01-29

## 2025-01-28 RX ORDER — METHYLPREDNISOLONE SODIUM SUCCINATE 40 MG/ML
40 INJECTION, POWDER, LYOPHILIZED, FOR SOLUTION INTRAMUSCULAR; INTRAVENOUS ONCE
Status: COMPLETED | OUTPATIENT
Start: 2025-01-28 | End: 2025-01-28

## 2025-01-28 RX ORDER — SODIUM CHLORIDE, SODIUM GLUCONATE, SODIUM ACETATE, POTASSIUM CHLORIDE, MAGNESIUM CHLORIDE, SODIUM PHOSPHATE, DIBASIC, AND POTASSIUM PHOSPHATE .53; .5; .37; .037; .03; .012; .00082 G/100ML; G/100ML; G/100ML; G/100ML; G/100ML; G/100ML; G/100ML
1500 INJECTION, SOLUTION INTRAVENOUS ONCE
Status: CANCELLED
Start: 2025-01-30 | End: 2025-01-29

## 2025-01-28 RX ORDER — EPINEPHRINE 1 MG/ML
0.3 INJECTION, SOLUTION, CONCENTRATE INTRAVENOUS
Status: DISCONTINUED | OUTPATIENT
Start: 2025-01-28 | End: 2025-01-31 | Stop reason: HOSPADM

## 2025-01-28 RX ORDER — SODIUM CHLORIDE, SODIUM GLUCONATE, SODIUM ACETATE, POTASSIUM CHLORIDE, MAGNESIUM CHLORIDE, SODIUM PHOSPHATE, DIBASIC, AND POTASSIUM PHOSPHATE .53; .5; .37; .037; .03; .012; .00082 G/100ML; G/100ML; G/100ML; G/100ML; G/100ML; G/100ML; G/100ML
1500 INJECTION, SOLUTION INTRAVENOUS ONCE
Status: COMPLETED | OUTPATIENT
Start: 2025-01-28 | End: 2025-01-28

## 2025-01-28 RX ORDER — EPINEPHRINE 1 MG/ML
0.3 INJECTION, SOLUTION, CONCENTRATE INTRAVENOUS
OUTPATIENT
Start: 2025-02-25

## 2025-01-28 RX ADMIN — DIPHENHYDRAMINE HYDROCHLORIDE 50 MG: 50 INJECTION, SOLUTION INTRAMUSCULAR; INTRAVENOUS at 08:49

## 2025-01-28 RX ADMIN — SODIUM CHLORIDE, SODIUM GLUCONATE, SODIUM ACETATE, POTASSIUM CHLORIDE, MAGNESIUM CHLORIDE, SODIUM PHOSPHATE, DIBASIC, AND POTASSIUM PHOSPHATE 1500 ML: .53; .5; .37; .037; .03; .012; .00082 INJECTION, SOLUTION INTRAVENOUS at 10:18

## 2025-01-28 RX ADMIN — OMALIZUMAB 300 MG: 150 INJECTION, SOLUTION SUBCUTANEOUS at 13:38

## 2025-01-28 RX ADMIN — METHYLPREDNISOLONE SODIUM SUCCINATE 40 MG: 40 INJECTION, POWDER, FOR SOLUTION INTRAMUSCULAR; INTRAVENOUS at 10:13

## 2025-01-28 RX ADMIN — FAMOTIDINE 40 MG: 10 INJECTION, SOLUTION INTRAVENOUS at 09:34

## 2025-01-28 NOTE — PROGRESS NOTES
Celine Roa tolerated IV hydration well with no complications. Xolair given 1 injection in Right Lower flank 1 injection in Left lower flank, tolerated well.  Epi-pen at chairside.  Expires 02/25.      Celine Roa is aware of future appt on 1/30 at 0830.      AVS declined by Celine Roa.

## 2025-01-29 ENCOUNTER — TELEPHONE (OUTPATIENT)
Age: 50
End: 2025-01-29

## 2025-01-29 ENCOUNTER — OFFICE VISIT (OUTPATIENT)
Age: 50
End: 2025-01-29
Payer: COMMERCIAL

## 2025-01-29 VITALS
WEIGHT: 160 LBS | HEART RATE: 100 BPM | OXYGEN SATURATION: 98 % | DIASTOLIC BLOOD PRESSURE: 74 MMHG | HEIGHT: 65 IN | SYSTOLIC BLOOD PRESSURE: 120 MMHG | TEMPERATURE: 98 F | BODY MASS INDEX: 26.66 KG/M2

## 2025-01-29 DIAGNOSIS — K50.119 CROHN'S DISEASE OF COLON WITH COMPLICATION (HCC): Primary | ICD-10-CM

## 2025-01-29 DIAGNOSIS — D89.40 MAST CELL ACTIVATION SYNDROME (HCC): ICD-10-CM

## 2025-01-29 DIAGNOSIS — K50.014 CROHN'S DISEASE OF SMALL INTESTINE WITH ABSCESS (HCC): ICD-10-CM

## 2025-01-29 DIAGNOSIS — D47.3 ESSENTIAL (HEMORRHAGIC) THROMBOCYTHEMIA (HCC): ICD-10-CM

## 2025-01-29 DIAGNOSIS — D84.9 IMMUNOSUPPRESSED STATUS (HCC): ICD-10-CM

## 2025-01-29 PROCEDURE — 99215 OFFICE O/P EST HI 40 MIN: CPT | Performed by: INTERNAL MEDICINE

## 2025-01-29 NOTE — TELEPHONE ENCOUNTER
I had to re-submit through pharmacy benefits for delivery option since they only approved it for 1 visit though her medical benefits.  Once approval is received we and set her up for delivery of the medication to the infusion center.
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
## 2025-01-29 NOTE — ASSESSMENT & PLAN NOTE
Orders:    CBC and differential; Future    Comprehensive metabolic panel; Future    Calprotectin,Fecal; Future    C-reactive protein; Future    US abdomen limited; Future    The patient is a 49-year-old woman with hereditary alpha tryptase, Hashimoto's, inflammatory bowel disease thought to be Crohn's, diagnosis of mast cell activation syndrome from [facility], anti-TNF induced lupus, previously on Stelara and most recently on Skyrizi but subsequently off medication and presenting for follow-up. She is now steroid dependent and her symptoms worsen as she tries to wean off.     Screening colonoscopy May 2023 with gastric erosion and colonic granular mucosa and biopsies with chronic active colitis.  Sigmoidoscopy September 2023 with some abnormal mucosa and relatively benign colon biopsies.    ERCP February 2024 with antral erosion and prior sphincterotomy and balloon sweeps notable for slight debris but no filling defects.    EF 60% and echocardiogram  EKG from August 2024 with sinus rhythm with occasional premature ventricular complexes and QTc interval noted at 477.  MRI/MRCP with stable chronic intra and extrahepatic biliary ductal dilation.  CT from August with proctocolitis, b/l nephrolithiasis and hepatomegaly.     CMP with low calcium, total protein and albumin but otherwise normal electrolytes, Cr and LFTs. Calprotectin >3300      Most recent iron studies normal.  Most recent CBC with elevated white blood cell count and hemoglobin but normal MCV.  Platelets elevated.  CRP elevated at 4.2.    Start Entyvio but it should be at a hospital given her concern for anaphylaxis.   Try to wean off prednisone  Obtain CMP and calprotectin  Repeat CBC  Awaiting bowel ultrasound  Awaiting pelvic ultrasound ordered by her PCP  Hyoscyamine as needed  Peppermint oil as needed    Next Quant gold and hepatitis panel January 2026.    Orders:    CBC and differential; Future    Comprehensive metabolic panel;  Future    Calprotectin,Fecal; Future    C-reactive protein; Future    US abdomen limited; Future

## 2025-01-29 NOTE — ASSESSMENT & PLAN NOTE
Avoid live virus vaccines  Yearly flu shot  COVID vaccine and booster  Pneumonia vaccine  Shingrix  Routine skin exams with the dermatologist    Routine mammograms  Routine Pap smears

## 2025-01-30 ENCOUNTER — HOSPITAL ENCOUNTER (EMERGENCY)
Facility: HOSPITAL | Age: 50
Discharge: HOME | End: 2025-01-30
Attending: EMERGENCY MEDICINE
Payer: COMMERCIAL

## 2025-01-30 ENCOUNTER — APPOINTMENT (EMERGENCY)
Dept: CT IMAGING | Facility: HOSPITAL | Age: 50
End: 2025-01-30
Payer: COMMERCIAL

## 2025-01-30 ENCOUNTER — HOSPITAL ENCOUNTER (OUTPATIENT)
Dept: INFUSION CENTER | Facility: HOSPITAL | Age: 50
Discharge: HOME | End: 2025-01-30
Attending: INTERNAL MEDICINE
Payer: COMMERCIAL

## 2025-01-30 VITALS
RESPIRATION RATE: 18 BRPM | HEART RATE: 81 BPM | HEIGHT: 62 IN | SYSTOLIC BLOOD PRESSURE: 114 MMHG | OXYGEN SATURATION: 95 % | WEIGHT: 160 LBS | BODY MASS INDEX: 29.44 KG/M2 | TEMPERATURE: 97.4 F | DIASTOLIC BLOOD PRESSURE: 75 MMHG

## 2025-01-30 VITALS
OXYGEN SATURATION: 98 % | DIASTOLIC BLOOD PRESSURE: 114 MMHG | RESPIRATION RATE: 20 BRPM | HEART RATE: 114 BPM | TEMPERATURE: 97.2 F | SYSTOLIC BLOOD PRESSURE: 157 MMHG

## 2025-01-30 DIAGNOSIS — E61.1 IRON DEFICIENCY: ICD-10-CM

## 2025-01-30 DIAGNOSIS — R93.5 ABNORMAL ABDOMINAL CT SCAN: ICD-10-CM

## 2025-01-30 DIAGNOSIS — R63.0 ANOREXIA: ICD-10-CM

## 2025-01-30 DIAGNOSIS — R19.7 VOMITING AND DIARRHEA: Primary | ICD-10-CM

## 2025-01-30 DIAGNOSIS — E44.1 MILD PROTEIN-CALORIE MALNUTRITION (HCC): ICD-10-CM

## 2025-01-30 DIAGNOSIS — M51.26 HERNIATION OF INTERVERTEBRAL DISC OF LUMBAR SPINE WITHOUT RADICULOPATHY: Primary | ICD-10-CM

## 2025-01-30 DIAGNOSIS — D50.0 IRON DEFICIENCY ANEMIA DUE TO CHRONIC BLOOD LOSS: ICD-10-CM

## 2025-01-30 DIAGNOSIS — R11.10 VOMITING AND DIARRHEA: Primary | ICD-10-CM

## 2025-01-30 DIAGNOSIS — K83.8 DILATION OF COMMON BILE DUCT: ICD-10-CM

## 2025-01-30 DIAGNOSIS — M54.50 ACUTE BILATERAL LOW BACK PAIN WITHOUT SCIATICA: ICD-10-CM

## 2025-01-30 LAB
ALBUMIN SERPL BCG-MCNC: 4 G/DL (ref 3.5–5)
ALP SERPL-CCNC: 49 U/L (ref 34–104)
ALT SERPL W P-5'-P-CCNC: 21 U/L (ref 7–52)
ANION GAP SERPL CALCULATED.3IONS-SCNC: 7 MMOL/L (ref 4–13)
AST SERPL W P-5'-P-CCNC: 26 U/L (ref 13–39)
BACTERIA UR QL AUTO: NORMAL /HPF
BASOPHILS # BLD AUTO: 0.02 THOUSANDS/ΜL (ref 0–0.1)
BASOPHILS NFR BLD AUTO: 0 % (ref 0–1)
BILIRUB DIRECT SERPL-MCNC: 0.04 MG/DL (ref 0–0.2)
BILIRUB SERPL-MCNC: 0.38 MG/DL (ref 0.2–1)
BILIRUB UR QL STRIP: NEGATIVE
BUN SERPL-MCNC: 11 MG/DL (ref 5–25)
CALCIUM SERPL-MCNC: 8.4 MG/DL (ref 8.4–10.2)
CHLORIDE SERPL-SCNC: 102 MMOL/L (ref 96–108)
CLARITY UR: CLEAR
CO2 SERPL-SCNC: 29 MMOL/L (ref 21–32)
COLOR UR: COLORLESS
CREAT SERPL-MCNC: 0.71 MG/DL (ref 0.6–1.3)
EOSINOPHIL # BLD AUTO: 0 THOUSAND/ΜL (ref 0–0.61)
EOSINOPHIL NFR BLD AUTO: 0 % (ref 0–6)
ERYTHROCYTE [DISTWIDTH] IN BLOOD BY AUTOMATED COUNT: 13.2 % (ref 11.6–15.1)
GFR SERPL CREATININE-BSD FRML MDRD: 98 ML/MIN/1.73SQ M
GLUCOSE SERPL-MCNC: 141 MG/DL (ref 65–140)
GLUCOSE UR STRIP-MCNC: ABNORMAL MG/DL
HCT VFR BLD AUTO: 47 % (ref 34.8–46.1)
HGB BLD-MCNC: 15.7 G/DL (ref 11.5–15.4)
HGB UR QL STRIP.AUTO: ABNORMAL
IMM GRANULOCYTES # BLD AUTO: 0.12 THOUSAND/UL (ref 0–0.2)
IMM GRANULOCYTES NFR BLD AUTO: 1 % (ref 0–2)
KETONES UR STRIP-MCNC: NEGATIVE MG/DL
LEUKOCYTE ESTERASE UR QL STRIP: NEGATIVE
LIPASE SERPL-CCNC: 34 U/L (ref 11–82)
LYMPHOCYTES # BLD AUTO: 0.69 THOUSANDS/ΜL (ref 0.6–4.47)
LYMPHOCYTES NFR BLD AUTO: 5 % (ref 14–44)
MCH RBC QN AUTO: 32.4 PG (ref 26.8–34.3)
MCHC RBC AUTO-ENTMCNC: 33.4 G/DL (ref 31.4–37.4)
MCV RBC AUTO: 97 FL (ref 82–98)
MONOCYTES # BLD AUTO: 0.16 THOUSAND/ΜL (ref 0.17–1.22)
MONOCYTES NFR BLD AUTO: 1 % (ref 4–12)
NEUTROPHILS # BLD AUTO: 12.21 THOUSANDS/ΜL (ref 1.85–7.62)
NEUTS SEG NFR BLD AUTO: 93 % (ref 43–75)
NITRITE UR QL STRIP: NEGATIVE
NON-SQ EPI CELLS URNS QL MICRO: NORMAL /HPF
NRBC BLD AUTO-RTO: 0 /100 WBCS
PH UR STRIP.AUTO: 7 [PH]
PLATELET # BLD AUTO: 413 THOUSANDS/UL (ref 149–390)
PMV BLD AUTO: 9.9 FL (ref 8.9–12.7)
POTASSIUM SERPL-SCNC: 4.7 MMOL/L (ref 3.5–5.3)
PROT SERPL-MCNC: 6.8 G/DL (ref 6.4–8.4)
PROT UR STRIP-MCNC: NEGATIVE MG/DL
RBC # BLD AUTO: 4.84 MILLION/UL (ref 3.81–5.12)
RBC #/AREA URNS AUTO: NORMAL /HPF
SODIUM SERPL-SCNC: 138 MMOL/L (ref 135–147)
SP GR UR STRIP.AUTO: 1.01 (ref 1–1.03)
UROBILINOGEN UR STRIP-ACNC: <2 MG/DL
WBC # BLD AUTO: 13.2 THOUSAND/UL (ref 4.31–10.16)
WBC #/AREA URNS AUTO: NORMAL /HPF

## 2025-01-30 PROCEDURE — 83690 ASSAY OF LIPASE: CPT | Performed by: EMERGENCY MEDICINE

## 2025-01-30 PROCEDURE — 99285 EMERGENCY DEPT VISIT HI MDM: CPT | Performed by: EMERGENCY MEDICINE

## 2025-01-30 PROCEDURE — 81001 URINALYSIS AUTO W/SCOPE: CPT | Performed by: EMERGENCY MEDICINE

## 2025-01-30 PROCEDURE — 96375 TX/PRO/DX INJ NEW DRUG ADDON: CPT

## 2025-01-30 PROCEDURE — 99285 EMERGENCY DEPT VISIT HI MDM: CPT

## 2025-01-30 PROCEDURE — 80076 HEPATIC FUNCTION PANEL: CPT | Performed by: EMERGENCY MEDICINE

## 2025-01-30 PROCEDURE — 36415 COLL VENOUS BLD VENIPUNCTURE: CPT | Performed by: EMERGENCY MEDICINE

## 2025-01-30 PROCEDURE — 96372 THER/PROPH/DIAG INJ SC/IM: CPT

## 2025-01-30 PROCEDURE — 96366 THER/PROPH/DIAG IV INF ADDON: CPT

## 2025-01-30 PROCEDURE — 85025 COMPLETE CBC W/AUTO DIFF WBC: CPT | Performed by: EMERGENCY MEDICINE

## 2025-01-30 PROCEDURE — 80048 BASIC METABOLIC PNL TOTAL CA: CPT | Performed by: EMERGENCY MEDICINE

## 2025-01-30 PROCEDURE — 96367 TX/PROPH/DG ADDL SEQ IV INF: CPT

## 2025-01-30 PROCEDURE — 74176 CT ABD & PELVIS W/O CONTRAST: CPT

## 2025-01-30 PROCEDURE — 96365 THER/PROPH/DIAG IV INF INIT: CPT

## 2025-01-30 RX ORDER — DIAZEPAM 5 MG/1
5 TABLET ORAL ONCE
Status: COMPLETED | OUTPATIENT
Start: 2025-01-30 | End: 2025-01-30

## 2025-01-30 RX ORDER — DIPHENHYDRAMINE HYDROCHLORIDE 50 MG/ML
50 INJECTION INTRAMUSCULAR; INTRAVENOUS ONCE
Status: COMPLETED | OUTPATIENT
Start: 2025-01-30 | End: 2025-01-30

## 2025-01-30 RX ORDER — DIAZEPAM 5 MG/1
5 TABLET ORAL EVERY 8 HOURS PRN
Qty: 6 TABLET | Refills: 0 | Status: SHIPPED | OUTPATIENT
Start: 2025-01-30 | End: 2025-02-03 | Stop reason: SDUPTHER

## 2025-01-30 RX ORDER — DIPHENHYDRAMINE HCL 25 MG
25 TABLET ORAL ONCE
Status: COMPLETED | OUTPATIENT
Start: 2025-01-30 | End: 2025-01-30

## 2025-01-30 RX ORDER — SODIUM CHLORIDE, SODIUM GLUCONATE, SODIUM ACETATE, POTASSIUM CHLORIDE, MAGNESIUM CHLORIDE, SODIUM PHOSPHATE, DIBASIC, AND POTASSIUM PHOSPHATE .53; .5; .37; .037; .03; .012; .00082 G/100ML; G/100ML; G/100ML; G/100ML; G/100ML; G/100ML; G/100ML
1500 INJECTION, SOLUTION INTRAVENOUS ONCE
Start: 2025-02-12 | End: 2025-01-31

## 2025-01-30 RX ORDER — HYDROMORPHONE HCL/PF 1 MG/ML
0.5 SYRINGE (ML) INJECTION ONCE
Status: COMPLETED | OUTPATIENT
Start: 2025-01-30 | End: 2025-01-30

## 2025-01-30 RX ORDER — METHYLPREDNISOLONE SODIUM SUCCINATE 40 MG/ML
40 INJECTION, POWDER, LYOPHILIZED, FOR SOLUTION INTRAMUSCULAR; INTRAVENOUS ONCE
Status: COMPLETED | OUTPATIENT
Start: 2025-01-30 | End: 2025-01-30

## 2025-01-30 RX ORDER — SODIUM CHLORIDE, SODIUM GLUCONATE, SODIUM ACETATE, POTASSIUM CHLORIDE, MAGNESIUM CHLORIDE, SODIUM PHOSPHATE, DIBASIC, AND POTASSIUM PHOSPHATE .53; .5; .37; .037; .03; .012; .00082 G/100ML; G/100ML; G/100ML; G/100ML; G/100ML; G/100ML; G/100ML
1500 INJECTION, SOLUTION INTRAVENOUS ONCE
Status: COMPLETED | OUTPATIENT
Start: 2025-01-30 | End: 2025-01-30

## 2025-01-30 RX ORDER — METHYLPREDNISOLONE SODIUM SUCCINATE 40 MG/ML
40 INJECTION, POWDER, LYOPHILIZED, FOR SOLUTION INTRAMUSCULAR; INTRAVENOUS ONCE
Start: 2025-02-12 | End: 2025-01-31

## 2025-01-30 RX ADMIN — FAMOTIDINE 40 MG: 10 INJECTION, SOLUTION INTRAVENOUS at 10:19

## 2025-01-30 RX ADMIN — HYDROMORPHONE HYDROCHLORIDE 0.5 MG: 1 INJECTION, SOLUTION INTRAMUSCULAR; INTRAVENOUS; SUBCUTANEOUS at 16:09

## 2025-01-30 RX ADMIN — DIPHENHYDRAMINE HYDROCHLORIDE 50 MG: 50 INJECTION, SOLUTION INTRAMUSCULAR; INTRAVENOUS at 16:15

## 2025-01-30 RX ADMIN — SODIUM CHLORIDE, SODIUM GLUCONATE, SODIUM ACETATE, POTASSIUM CHLORIDE, MAGNESIUM CHLORIDE, SODIUM PHOSPHATE, DIBASIC, AND POTASSIUM PHOSPHATE 1500 ML: .53; .5; .37; .037; .03; .012; .00082 INJECTION, SOLUTION INTRAVENOUS at 10:58

## 2025-01-30 RX ADMIN — DIPHENHYDRAMINE HYDROCHLORIDE 50 MG: 50 INJECTION, SOLUTION INTRAMUSCULAR; INTRAVENOUS at 09:46

## 2025-01-30 RX ADMIN — DIPHENHYDRAMINE HYDROCHLORIDE 25 MG: 25 TABLET ORAL at 20:40

## 2025-01-30 RX ADMIN — DIAZEPAM 5 MG: 5 TABLET ORAL at 21:48

## 2025-01-30 RX ADMIN — METHYLPREDNISOLONE SODIUM SUCCINATE 40 MG: 40 INJECTION, POWDER, FOR SOLUTION INTRAMUSCULAR; INTRAVENOUS at 10:54

## 2025-01-30 RX ADMIN — DIAZEPAM 5 MG: 5 TABLET ORAL at 20:31

## 2025-01-30 NOTE — ED NOTES
Discharge Summary       PATIENT ID: Filomena Carcamo  MRN: [id number]   YOB: 1965    DATE OF ADMISSION: 7/19/2022  5:23 PM    DATE OF DISCHARGE:   PRIMARY CARE PROVIDER: Elizabeth Richardson MD     ATTENDING PHYSICIAN: Con Richard  DISCHARGING PROVIDER: Dania Fraser      CONSULTATIONS: IP CONSULT TO ONCOLOGY    PROCEDURES/SURGERIES: * No surgery found *    ADMITTING DIAGNOSES:    Patient Active Problem List    Diagnosis Date Noted    Hypotension 07/19/2022    Leukocytosis 07/19/2022    Peripheral edema 05/16/2022    Leg edema 05/16/2022    Body mass index 40.0-44.9, adult ([address] 10/19/2020    Chronic ulcer of skin ([address] 08/13/2020    Obstructive sleep apnea syndrome 08/13/2020    Radiation injury 08/13/2020    Status post bariatric surgery 07/30/2020    Severe obesity ([address] 07/30/2020    Neuropathy 05/25/2018    Encounter for long-term (current) use of insulin ([address] 04/09/2016    Mixed hyperlipidemia 04/09/2016    Essential hypertension 04/09/2016    Type II diabetes mellitus, uncontrolled 04/09/2016    Neuropathy due to chemotherapeutic drug ([address] 04/03/2014    Breast cancer, stage 2 04/01/2011       DISCHARGE DIAGNOSES / PLAN:      Hypotension  Leukocytosis  Bilateral breast cancer status postmastectomy ongoing chemo  History of gastric bypass  History of hypertension  Hyperlipidemia  Chronic headache  Type 2 diabetes  Depression and anxiety  Back pain and left leg knee pain         DISCHARGE MEDICATIONS:  Current Discharge Medication List        START taking these medications    Details   amoxicillin-clavulanate (Augmentin) 875-125 mg per tablet Take 1 Tablet by mouth two (2) times a day. Qty: 20 Tablet, Refills: 0  Start date: 7/21/2022           CONTINUE these medications which have NOT CHANGED    Details   topiramate (TOPAMAX) 25 mg tablet Take 1 Tablet by mouth two (2) times daily (with meals).   Qty: 60 Tablet, Refills: 0      metFORMIN ER (GLUCOPHAGE XR) 500 mg tablet TAKE 2 TABLET BY Pt refused the covid swap at this time.     Shruti COLLIER Stalisa  01/30/25 [id number]     MOUTH TWICE DAILY DAILY WITH MEALS STOP SYNJARDY  Qty: 360 Tablet, Refills: 1    Associated Diagnoses: Uncontrolled type 2 diabetes mellitus with hyperglycemia, with long-term current use of insulin ([address] S/P gastric bypass; Neuropathy      semaglutide (Ozempic) 0.25 mg or 0.5 mg/dose (2 mg/1.5 ml) subq pen Inject 0.25mg once weekly for three weeks, then increase to 0.5mg once weekly  Qty: 9 mL, Refills: 1    Associated Diagnoses: Uncontrolled type 2 diabetes mellitus with hyperglycemia, with long-term current use of insulin ([address] S/P gastric bypass; Neuropathy      DULoxetine (CYMBALTA) 60 mg capsule TAKE 1 CAPSULE BY MOUTH DAILY  Qty: 30 Capsule, Refills: 6      hydrOXYzine pamoate (VISTARIL) 25 mg capsule Take 50 mg by mouth nightly. sertraline (ZOLOFT) 25 mg tablet sertraline 25 mg tablet   TAKE 1 TABLET BY MOUTH EVERY DAY      b complex vitamins tablet Take 1 Tab by mouth daily. ascorbic acid-collagen (Collagen Plus Vitamin C) 125-740 mg cap Collagen Plus Vitamin C      IRON, FERROUS SULFATE, PO Iron (ferrous sulfate)      timoloL (BetimoL) 0.5 % ophthalmic solution Administer 1 Drop to both eyes two (2) times a day. use in affected eye(s)      multivitamin-min-iron-FA-vit K (Bariatric Multivitamins) 45 mg iron- 800 mcg-120 mcg cap Bariatric Multivitamins      omega 3-dha-epa-fish oil (Fish Oil) 100-160-1,000 mg cap Take 3 mg by mouth daily. triamcinolone acetonide (KENALOG) 0.1 % topical cream triamcinolone acetonide 0.1 % topical cream      calcium-cholecalciferol, d3, 600 mg calcium- 200 unit cap Take 600 mg by mouth daily. traZODone (DESYREL) 100 mg tablet TK 1 T PO QD HS  Refills: 1      latanoprost (XALATAN) 0.005 % ophthalmic solution Administer 1 Drop to both eyes nightly.      gabapentin (NEURONTIN) 800 mg tablet Take 1 Tab by mouth three (3) times daily. Refills: 0      biotin 500 mcg Cap Take  by mouth.            STOP taking these medications       doxycycline (VIBRAMYCIN) 100 mg capsule Comments:   Reason for Stopping:         furosemide (Lasix) 40 mg tablet Comments:   Reason for Stopping:                 NOTIFY YOUR PHYSICIAN FOR ANY OF THE FOLLOWING:   Fever over 101 degrees for 24 hours. Chest pain, shortness of breath, fever, chills, nausea, vomiting, diarrhea, change in mentation, falling, weakness, bleeding. Severe pain or pain not relieved by medications. Or, any other signs or symptoms that you may have questions about. DISPOSITION:  x  Home With:   OT  PT  HH  RN       Long term SNF/Inpatient Rehab    Independent/assisted living    Hospice    Other:       PATIENT CONDITION AT DISCHARGE: Stable      PHYSICAL EXAMINATION AT DISCHARGE:  General:          Alert, cooperative, no distress, appears stated age. HEENT:           Atraumatic, anicteric sclerae, pink conjunctivae                          No oral ulcers, mucosa moist, throat clear, dentition fair  Neck:               Supple, symmetrical  Lungs:             Clear to auscultation bilaterally. No Wheezing or Rhonchi. No rales. Chest wall:      No tenderness  No Accessory muscle use. Heart:              Regular  rhythm,  No  murmur   No edema  Abdomen:        Soft, non-tender. Not distended. Bowel sounds normal  Extremities:     No cyanosis. No clubbing,                            Skin turgor normal, Capillary refill normal  Skin:                Not pale. Not Jaundiced  No rashes   Psych:             Not anxious or agitated. Neurologic:      Alert, moves all extremities, answers questions appropriately and responds to commands     XR KNEE LT 3 V   Final Result   No acute abnormality. XR SPINE LUMB MIN 4 V   Final Result   1. Multilevel degenerative change has not significantly progressed in the   interval         XR CHEST PORT   Final Result   No acute cardiopulmonary process.            Recent Results (from the past 24 hour(s))   GLUCOSE, POC    Collection Time: 07/20/22  1:14 PM   Result Value Ref Range Glucose (POC) 97 65 - 117 mg/dL    Performed by [facility] [place], POC    Collection Time: 07/20/22  5:01 PM   Result Value Ref Range    Glucose (POC) 111 65 - 117 mg/dL    Performed by [facility] [place], POC    Collection Time: 07/20/22  9:52 PM   Result Value Ref Range    Glucose (POC) 102 65 - 117 mg/dL    Performed by [id number] [facility] [place], POC    Collection Time: 07/21/22  7:45 AM   Result Value Ref Range    Glucose (POC) 84 65 - 117 mg/dL    Performed by Laurent Box (FERNANDO)    GLUCOSE, POC    Collection Time: 07/21/22 11:17 AM   Result Value Ref Range    Glucose (POC) 107 65 - 117 mg/dL    Performed by Elva Wright    CBC W/O DIFF    Collection Time: 07/21/22 12:15 PM   Result Value Ref Range    WBC 18.6 (H) 3.6 - 11.0 K/uL    RBC 2.73 (L) 3.80 - 5.20 M/uL    HGB 8.4 (L) 11.5 - 16.0 g/dL    HCT 25.6 (L) 35.0 - 47.0 %    MCV 93.8 80.0 - 99.0 FL    MCH 30.8 26.0 - 34.0 PG    MCHC 32.8 30.0 - 36.5 g/dL    RDW 15.8 (H) 11.5 - 14.5 %    PLATELET 064 (H) 485 - 400 K/uL    MPV 10.7 8.9 - 12.9 FL    NRBC 0.1 (H) 0.0  WBC    ABSOLUTE NRBC 0.02 (H) 0.00 - 0.01 K/uL          HOSPITAL COURSE:      Patient is a 59y.o. year old female with significant past medical history of breast cancer status post bilateral mastectomy hypertension hyperlipidemia history of gastric bypass CHF type 2 diabetes patient recently going through chemotherapy last chemotherapy 2 weeks ago came to the ER complaining of generalized weakness dizziness seen by ER physician patient was hypotensive blood work shows leukocytosis  Patient was seen by oncologist yesterday sent to the ER patient denies any fever chills cough congestion nausea vomiting diarrhea constipation patient was admitted for hypotension leukocytosis     7/21  Pt is feeling unchanged from yesterday.  She is in NAD but states that she just wants to know what the results of her studies are so she can go home    Discussed with oncologist patient can be discharged home and follow-up with him next week    Today patient has no complaints    Medication reconciliation done      Signed:   Jaden Arzate MD  7/21/2022  12:40 PM

## 2025-01-30 NOTE — PLAN OF CARE
Problem: Potential for Falls  Goal: Patient will remain free of falls  Description: INTERVENTIONS:  - Educate patient/family on patient safety including physical limitations  - Instruct patient to call for assistance with activity   - Consult OT/PT to assist with strengthening/mobility   - Keep Call bell within reach  - Keep bed low and locked with side rails adjusted as appropriate  - Keep care items and personal belongings within reach  - Initiate and maintain comfort rounds  - Make Fall Risk Sign visible to staff  - Consider moving patient to room near nurses station  Outcome: Progressing     Problem: INFECTION - ADULT  Goal: Absence or prevention of progression during hospitalization  Description: INTERVENTIONS:  - Assess and monitor for signs and symptoms of infection  - Monitor lab/diagnostic results  - Monitor all insertion sites, i.e. indwelling lines, tubes, and drains  - Monitor endotracheal if appropriate and nasal secretions for changes in amount and color  - [place] appropriate cooling/warming therapies per order  - Administer medications as ordered  - Instruct and encourage patient and family to use good hand hygiene technique  - Identify and instruct in appropriate isolation precautions for identified infection/condition  Outcome: Progressing     Problem: Knowledge Deficit  Goal: Patient/family/caregiver demonstrates understanding of disease process, treatment plan, medications, and discharge instructions  Description: Complete learning assessment and assess knowledge base.  Interventions:  - Provide teaching at level of understanding  - Provide teaching via preferred learning methods  Outcome: Progressing

## 2025-01-30 NOTE — ED PROVIDER NOTES
Time reflects when diagnosis was documented in both MDM as applicable and the Disposition within this note       Time User Action Codes Description Comment    1/30/2025  4:02 PM Grzegorz Lopez Add [M54.50] Acute bilateral low back pain without sciatica     1/30/2025  6:15 PM Grzegorz Lopez Add [R93.5] Abnormal abdominal CT scan     1/30/2025  6:15 PM Grzegorz Lopez [K83.8] Dilation of common bile duct     1/30/2025  8:38 PM Grzegorz Lopez Add [M51.26] Herniation of intervertebral disc of lumbar spine without radiculopathy     1/30/2025  8:39 PM Grzegorz Lopez Modify [M54.50] Acute bilateral low back pain without sciatica     1/30/2025  8:39 PM Grzegorz Lopez Modify [M51.26] Herniation of intervertebral disc of lumbar spine without radiculopathy           ED Disposition       ED Disposition   Discharge    Condition   Stable    Date/Time   Thu Jan 30, 2025  8:39 PM    Comment   Celine Roa discharge to home/self care.                   Assessment & Plan       Medical Decision Making  49-year-old female presenting for low back pain and urinary complaints.  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  She is again requesting Benadryl today for management of her back pain.  She has no clinical evidence of urticaria hypoxia stridor wheezing.  She did not have midline back pain or red flag features associated with it such as trauma history of malignancy prior spinal surgery bowel or bladder dysfunction.  Differential diagnosis includes musculoskeletal etiologies of low back pain, chronic pain syndrome, UTI, kidney stone.  Will check CT abdomen pelvis without contrast as well as lumbar reconstitution.  Will give pain medication for symptoms and check urine.  Dispo anticipating discharge upon results of workup to rule out more  serious pathology.  Will refer to PCP/comprehensive spine if symptoms persist.  If UTI will treat with antibiotics.    CT scan led to a radiologist read indicating that the patient has known/stable pneumobilia but interval increase in CBD dilation recommending correlation with LFTs to rule out biliary obstruction.  No clinical evidence of biliary obstruction.  Labs were obtained which demonstrated normal LFTs.    CT lumbar recon however did demonstrate new large paracentral posterior disc protrusion at L5 on S1.  This is likely the etiology of her MSK pain.  She further clarifies upon discussion of this finding that she did awkwardly slide out from office chair and thinks that she exacerbated the back pain at that time several days ago.    Patient then continued to complain of worsening pain.  Disposition became difficult as patient's chronic illnesses precluded an easy outpatient therapy she was requesting a discharge her with intramuscular injections I discussed that this is atypical and not something I think I could provide from the ER.  She states that any oral medications cause her mast cells to activate and throws her into anaphylaxis.  There became somewhat of a circular discussion about how to manage her symptoms with the patient requesting that we trial each dose of medicine (while giving her Benadryl with it) to see if there was some reaction and this is not consistent with the pathophysiology of a hypersensitivity reaction and clinically the patient had no objective evidence of anaphylaxis.  Ultimately it was felt that it would be reasonable to trial a few days of Valium to help with her symptoms and that she needs to follow-up with physical therapy.  Most large disc protrusions do regress on their own with conservative measures and time.  She has no red flag features for spinal cord compression or cauda equina/conus medullaris syndrome.  She felt improved and agreeable to outpatient follow-up and was  discharged taken home by family members.    Problems Addressed:  Abnormal abdominal CT scan: undiagnosed new problem with uncertain prognosis  Acute bilateral low back pain without sciatica: acute illness or injury  Dilation of common bile duct: chronic illness or injury  Herniation of intervertebral disc of lumbar spine without radiculopathy: acute illness or injury    Amount and/or Complexity of Data Reviewed  Labs: ordered. Decision-making details documented in ED Course.  Radiology: ordered.    Risk  OTC drugs.  Prescription drug management.        ED Course as of 01/31/25 0126   u Jan 30, 2025   1659 Bacteria, UA: None Seen   1659 WBC, UA: 0-1   1659 RBC Urine: 0-5   1659 Leukocytes, UA: Negative   1659 Nitrite, UA: Negative   1815 common bile duct measures up to 1.4 cm, somewhat increased from prior exam. Correlation with LFTs is recommended and MRCP may also be considered if there is concern for biliary   obstruction.     1817 There is no clinical concern for biliary obstruction, will obtain the labs that the radiologist is recommending in his official report to help raise/lower concern for dilated CBD       Medications   diphenhydrAMINE (BENADRYL) injection 50 mg (50 mg Intramuscular Given 1/30/25 1615)   HYDROmorphone (DILAUDID) injection 0.5 mg (0.5 mg Intramuscular Given 1/30/25 1609)   diazepam (VALIUM) tablet 5 mg (5 mg Oral Given 1/30/25 2031)   diphenhydrAMINE (BENADRYL) tablet 25 mg (25 mg Oral Given 1/30/25 2040)   diazepam (VALIUM) tablet 5 mg (5 mg Oral Given by Other 1/30/25 2148)       ED Risk Strat Scores                          SBIRT 22yo+      Flowsheet Row Most Recent Value   Initial Alcohol Screen: US AUDIT-C     1. How often do you have a drink containing alcohol? 0 Filed at: 01/30/2025 1410   2. How many drinks containing alcohol do you have on a typical day you are drinking?  0 Filed at: 01/30/2025 1410   3a. Male UNDER 65: How often do you have five or more drinks on one occasion? 0  Filed at: 01/30/2025 1410   3b. FEMALE Any Age, or MALE 65+: How often do you have 4 or more drinks on one occassion? 0 Filed at: 01/30/2025 1410   Audit-C Score 0 Filed at: 01/30/2025 1410   LAUREL: How many times in the past year have you...    Used an illegal drug or used a prescription medication for non-medical reasons? Never Filed at: 01/30/2025 1410                            History of Present Illness       Chief Complaint   Patient presents with    Back Pain     Patient reports that she gets injections in back every four weeks, last injections were yesterday. States that she woke up today with stiffness and pain in lower back. 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 pt denies, documented in cardio note    Pain of right thigh 02/13/2023    Pancreatitis     sphinger of     Psychiatric disorder     RA (rheumatoid arthritis) (HCC)     Seizures (HCC)     Sepsis without acute organ dysfunction (HCC) 2/15/2024    SIRS (systemic inflammatory response syndrome) (HCC) 8/25/2017    Thrombocytosis 04/05/2022    Ulcerative colitis (HCC)     Visual impairment       Past Surgical History:   Procedure Laterality Date    ABDOMINAL SURGERY  2017    APPENDECTOMY      CHOLECYSTECTOMY      COLONOSCOPY  2019    CYSTOSCOPY N/A 06/08/2023     Procedure: CYSTOSCOPY, mini TURBT with fulgeration;  Surgeon: Tee Bruno MD;  Location: [facility];  Service: Urology    EGD AND COLONOSCOPY N/A 09/12/2017    Procedure: EGD AND COLONOSCOPY;  Surgeon: Tommy Oliver MD;  Location:  GI LAB;  Service: Gastroenterology    ERCP N/A 09/15/2017    Procedure: ENDOSCOPIC RETROGRADE CHOLANGIOPANCREATOGRAPHY (ERCP);  Surgeon: Dre Soto MD;  Location: [facility];  Service: Gastroenterology    ERCP  02/14/2024    HYSTERECTOMY      KNEE SURGERY Right     LAPAROSCOPIC ENDOMETRIOSIS FULGURATION      ORIF ANKLE FRACTURE Left     TN ARTHRS KNE SURG W/MENISCECTOMY MED/LAT W/SHVG Left 05/12/2017    Procedure: POSSIBLE MEDIAL MENISECTOMY;  Surgeon: Kristian Avila MD;  Location: [facility];  Service: Orthopedics    TN ARTHRS KNEE DEBRIDEMENT/SHAVING ARTCLR CRTLG Left 05/12/2017    Procedure: KNEE ARTHROSCOPY EVALUATION, CHONDROPLASTY;  Surgeon: Kristian Avila MD;  Location: [facility];  Service: Orthopedics    TN LAPS ABD PRTM&OMENTUM DX W/WO SPEC BR/WA SPX N/A 12/12/2017    Procedure: LAPAROSCOPY DIAGNOSTIC  WITH LYSIS OF ADHESIONS;  Surgeon: Olaf John DO;  Location:  MAIN OR;  Service: General    UPPER GASTROINTESTINAL ENDOSCOPY  2019      Family History   Problem Relation Age of Onset    Ulcerative colitis Mother     Arthritis Mother     Colon polyps Mother     Heart failure Father     Neuropathy Father     Macular degeneration Father     Diabetes Father     Early death Father     Vision loss Father     Asthma Daughter     Hypothyroidism Daughter     Heart disease Maternal Grandmother     Arthritis Maternal Grandmother     No Known Problems Maternal Grandfather     Arthritis Paternal Grandmother     Macular degeneration Paternal Grandmother     Vision loss Paternal Grandmother     Lymphoma Paternal Grandfather     Cancer Paternal Grandfather     Early death Paternal Grandfather     Breast cancer Maternal Aunt     Cancer Maternal Aunt     Miscarriages / Stillbirths Maternal  Aunt     Heart failure Paternal Aunt     Heart failure Paternal Aunt     Irritable bowel syndrome Paternal Aunt     Breast cancer Paternal Aunt 54    Breast cancer additional onset Paternal Aunt 58    Hypothyroidism Paternal Aunt     Cancer Paternal Aunt     No Known Problems Son     No Known Problems Son     Kidney disease Brother     Depression Paternal Uncle     Early death Paternal Uncle     Depression Paternal Uncle     Early death Paternal Uncle       Social History     Tobacco Use    Smoking status: Never    Smokeless tobacco: Never   Vaping Use    Vaping status: Never Used   Substance Use Topics    Alcohol use: Never    Drug use: Never      E-Cigarette/Vaping    E-Cigarette Use Never User       E-Cigarette/Vaping Substances    Nicotine No     THC No     CBD No     Flavoring No     Other No     Unknown No       I have reviewed and agree with the history as documented.     49-year-old female extensive past medical history as well as allergy history presenting here for low back pain and urinary complaints.  Patient states that she began with low back pain a few days ago she attributed this to increased physical work and shoveling snow but denies any direct trauma.  States that she receives injections of Xolair and went for injections which were injected into the tissues of the bilateral flanks 2 days ago.  States that she felt like this exacerbated her discomfort she notes low back pain across bilateral SI joints it does not radiate to the legs or to the chest or upper back.  There is no associated numbness or weakness.  She denies any vomiting but notes some nausea.  Also states that she has what she thinks is some hematuria intermittently with urination as well as some urinary frequency.  Denies any fevers.  States that once in the past she had a UTI and had some associated back pain.  Denies any falls or direct trauma to the back.  Denies a history of similar chronic back pain.  States that she feels her  mast cell disease is acting up as a result of her being in pain and is requesting injection of Benadryl 50 mg.      Back Pain  Associated symptoms: no abdominal pain, no chest pain, no dysuria and no fever        Review of Systems   Constitutional:  Negative for chills and fever.   HENT:  Negative for ear pain and sore throat.    Eyes:  Negative for pain and visual disturbance.   Respiratory:  Negative for cough and shortness of breath.    Cardiovascular:  Negative for chest pain and palpitations.   Gastrointestinal:  Negative for abdominal pain, diarrhea, nausea and vomiting.   Genitourinary:  Positive for flank pain, frequency and hematuria. Negative for dysuria.   Musculoskeletal:  Positive for back pain. Negative for arthralgias, gait problem and neck pain.   Skin:  Negative for color change and rash.   Neurological:  Negative for seizures and syncope.   All other systems reviewed and are negative.          Objective       ED Triage Vitals [01/30/25 1408]   Temperature Pulse Blood Pressure Respirations SpO2 Patient Position - Orthostatic VS   (!) 97.4 °F (36.3 °C) (!) 108 149/80 18 95 % Sitting      Temp Source Heart Rate Source BP Location FiO2 (%) Pain Score    Temporal Monitor Right arm -- 10 - Worst Possible Pain      Vitals      Date and Time Temp Pulse SpO2 Resp BP Pain Score FACES Pain Rating User   01/30/25 2130 97.4 °F (36.3 °C) 81 95 % 18 114/75 -- --    01/30/25 2031 -- 84 93 % 16 102/64 8 --    01/30/25 1900 -- 84 93 % 18 107/68 -- --    01/30/25 1630 97.4 °F (36.3 °C) 92 94 % 18 103/69 -- --    01/30/25 1609 -- -- -- -- -- 10 - Worst Possible Pain --    01/30/25 1408 97.4 °F (36.3 °C) 108 95 % 18 149/80 10 - Worst Possible Pain -- PP            Physical Exam  Vitals and nursing note reviewed. Exam conducted with a chaperone present.   Constitutional:       General: She is not in acute distress.     Appearance: Normal appearance. She is well-developed. She is not ill-appearing,  toxic-appearing or diaphoretic.   HENT:      Head: Normocephalic and atraumatic.      Right Ear: External ear normal.      Left Ear: External ear normal.      Nose: Nose normal.      Mouth/Throat:      Mouth: Mucous membranes are moist.      Pharynx: Oropharynx is clear.   Eyes:      Conjunctiva/sclera: Conjunctivae normal.   Cardiovascular:      Rate and Rhythm: Normal rate and regular rhythm.      Heart sounds: No murmur heard.  Pulmonary:      Effort: Pulmonary effort is normal. No respiratory distress.      Breath sounds: Normal breath sounds. No stridor. No wheezing, rhonchi or rales.      Comments: Speaking in full sentences.  Abdominal:      Palpations: Abdomen is soft.      Tenderness: There is no abdominal tenderness.   Musculoskeletal:         General: No swelling.      Cervical back: Neck supple.        Back:       Comments: No midline spinal tenderness step-offs or deformities in the lumbar spine.  No evidence of cellulitic changes with respect to injection sites.   Skin:     General: Skin is warm and dry.      Capillary Refill: Capillary refill takes less than 2 seconds.      Comments: No hives or urticaria   Neurological:      General: No focal deficit present.      Mental Status: She is alert. Mental status is at baseline.      Sensory: No sensory deficit.      Motor: No weakness.   Psychiatric:         Mood and Affect: Mood normal.      Comments: Provides most of history/exam with her eyes closed.         Results Reviewed       Procedure Component Value Units Date/Time    CBC and differential [[id number]]  (Abnormal) Collected: 01/30/25 1846    Lab Status: Final result Specimen: Blood from Arm, Right Updated: 01/30/25 1959     WBC 13.20 Thousand/uL      RBC 4.84 Million/uL      Hemoglobin 15.7 g/dL      Hematocrit 47.0 %      MCV 97 fL      MCH 32.4 pg      MCHC 33.4 g/dL      RDW 13.2 %      MPV 9.9 fL      Platelets 413 Thousands/uL      nRBC 0 /100 WBCs      Segmented % 93 %      Immature Grans % 1  %      Lymphocytes % 5 %      Monocytes % 1 %      Eosinophils Relative 0 %      Basophils Relative 0 %      Absolute Neutrophils 12.21 Thousands/µL      Absolute Immature Grans 0.12 Thousand/uL      Absolute Lymphocytes 0.69 Thousands/µL      Absolute Monocytes 0.16 Thousand/µL      Eosinophils Absolute 0.00 Thousand/µL      Basophils Absolute 0.02 Thousands/µL     Narrative:      This is an appended report.  These results have been appended to a previously verified report.    Hepatic function panel [[id number]]  (Normal) Collected: 01/30/25 1846    Lab Status: Final result Specimen: Blood from Arm, Right Updated: 01/30/25 1918     Total Bilirubin 0.38 mg/dL      Bilirubin, Direct 0.04 mg/dL      Alkaline Phosphatase 49 U/L      AST 26 U/L      ALT 21 U/L      Total Protein 6.8 g/dL      Albumin 4.0 g/dL     Basic metabolic panel [[id number]]  (Abnormal) Collected: 01/30/25 1846    Lab Status: Final result Specimen: Blood from Arm, Right Updated: 01/30/25 1918     Sodium 138 mmol/L      Potassium 4.7 mmol/L      Chloride 102 mmol/L      CO2 29 mmol/L      ANION GAP 7 mmol/L      BUN 11 mg/dL      Creatinine 0.71 mg/dL      Glucose 141 mg/dL      Calcium 8.4 mg/dL      eGFR 98 ml/min/1.73sq m     Narrative:      [employer] Kid[employer] guidelines for Chronic Kidney Disease (CKD):     Stage 1 with normal or high GFR (GFR > 90 mL/min/1.73 square meters)    Stage 2 Mild CKD (GFR = 60-89 mL/min/1.73 square meters)    Stage 3A Moderate CKD (GFR = 45-59 mL/min/1.73 square meters)    Stage 3B Moderate CKD (GFR = 30-44 mL/min/1.73 square meters)    Stage 4 Severe CKD (GFR = 15-29 mL/min/1.73 square meters)    Stage 5 End Stage CKD (GFR <15 mL/min/1.73 square meters)  Note: GFR calculation is accurate only with a steady state creatinine    Lipase [[id number]]  (Normal) Collected: 01/30/25 1846    Lab Status: Final result Specimen: Blood from Arm, Right Updated: 01/30/25 1918     Lipase 34 u/L     Urine Microscopic  [[id number]]  (Normal) Collected: 01/30/25 1611    Lab Status: Final result Specimen: Urine, Clean Catch Updated: 01/30/25 1655     RBC, UA 0-5 /hpf      WBC, UA 0-1 /hpf      Epithelial Cells Occasional /hpf      Bacteria, UA None Seen /hpf     UA w Reflex to Microscopic w Reflex to Culture [[id number]]  (Abnormal) Collected: 01/30/25 1611    Lab Status: Final result Specimen: Urine, Clean Catch Updated: 01/30/25 1630     Color, UA Colorless     Clarity, UA Clear     Specific Gravity, UA 1.010     pH, UA 7.0     Leukocytes, UA Negative     Nitrite, UA Negative     Protein, UA Negative mg/dl      Glucose, UA 1000 (1%) mg/dl      Ketones, UA Negative mg/dl      Urobilinogen, UA <2.0 mg/dl      Bilirubin, UA Negative     Occult Blood, UA Small    FLU/COVID Rapid Antigen (30 min. TAT) - Preferred screening test in ED [[id number]]     Lab Status: No result Specimen: Nares from Nose             CT abdomen pelvis wo contrast   Final Interpretation by Kai Johnson MD (01/30 0896)      Bilateral nonobstructing renal calculi measuring up to 3 mm. No hydronephrosis of either kidney.      Status post enterotomy. Pneumobilia, increased in prior exam, however common bile duct measures up to 1.4 cm, somewhat increased from prior exam. Correlation with LFTs is recommended and MRCP may also be considered if there is concern for biliary    obstruction.      Left adnexal 2.1 cm cyst.      The study was marked in EPIC for immediate notification.      Workstation performed: [id number]         CT recon only lumbar spine   Final Interpretation by Fei Temple MD (01/30 0162)      Since February 2019 there is a new large right paracentral posterior disc extrusion at L5-S1 extending inferiorly from the disc space that appears to contact the traversing right S1 nerve root. Further evaluation with MRI would be useful.      2 mm nonobstructing calculus in the upper pole collecting system of the right kidney.      2.0 cm left ovarian  cyst. Further evaluation with ultrasound is recommended.      Since 2019 there is a new mild superior endplate compression deformity of the anterior L3 vertebral body that is age-indeterminate but probably chronic.      The study was marked in EPIC for immediate notification.            Workstation performed: [id number]             Procedures    ED Medication and Procedure Management   Prior to Admission Medications   Prescriptions Last Dose Informant Patient Reported? Taking?   Ascorbic Acid (vitamin C) 1000 MG tablet  Self Yes No   Sig: Take 500 mg by mouth 2 (two) times a day   B Complex Vitamins (VITAMIN B COMPLEX PO)  Self Yes No   Sig: Take 100 mg by mouth daily   EPINEPHrine (EPIPEN) 0.3 mg/0.3 mL SOAJ  Self No No   Sig: Inject 0.3 mL (0.3 mg total) into a muscle daily as needed for anaphylaxis No substitutions with generic   Lactobacillus (PROBIOTIC ACIDOPHILUS PO)  Self Yes No   Sig: Take by mouth   Lactobacillus-Inulin ([employer]) CAPS  Self Yes No   Sig: Take 200 mg by mouth daily   PEPPERMINT OIL PO  Self Yes No   Sig: Take 1 capsule by mouth 2 (two) times a day   Probiotic Product (CULTURELLE ABDOMINAL SUPPORT PO)  Self Yes No   Sig: Take 200 mg by mouth daily   Riboflavin (Vitamin B-2) 100 MG TABS  Self Yes No   Sig: Take 200 mg by mouth 2 (two) times a day   Upadacitinib ER (Rinvoq) 45 MG TB24  Self No No   Sig: Take 45 mg by mouth daily Induction dosing   cetirizine (ZyrTEC) 10 mg tablet  Self Yes No   Sig: Take 20 mg by mouth 2 (two) times a day   cholecalciferol (VITAMIN D3) 1,000 units tablet  Self Yes No   Sig: Take 5,000 Units by mouth daily   cyanocobalamin (VITAMIN B-12) 100 mcg tablet  Self Yes No   Sig: Take by mouth daily   diphenhydrAMINE (BENADRYL) 50 mg capsule  Self Yes No   Sig: Take 50 mg by mouth 4 (four) times a day as needed for itching Pt states takes 50mg daily, but may go up to 4 times daily prn   famotidine (PEPCID) 40 MG tablet  Self No No   Sig: TAKE ONE  TABLET BY MOUTH ONCE DAILY   furosemide (LASIX) 20 mg tablet  Self Yes No   Sig: Take 20 mg by mouth if needed   hydrOXYzine HCL (ATARAX) 25 mg tablet  Self No No   Sig: Take 1 tablet (25 mg total) by mouth daily as needed for itching   hyoscyamine (ANASPAZ,LEVSIN) 0.125 MG tablet  Self Yes No   Sig: Take 0.125 mg by mouth every 6 (six) hours as needed   levalbuterol (XOPENEX HFA) 45 mcg/act inhaler  Self No No   Sig: Inhale 1-2 puffs every 4 (four) hours as needed for shortness of breath   levothyroxine 50 mcg tablet  Self Yes No   Sig: Take 75 mcg by mouth daily   liothyronine (CYTOMEL) 5 mcg tablet  Self No No   Sig: Take 1 tablet (5 mcg total) by mouth daily Do not start before June 14, 2023.   Patient not taking: Reported on 11/18/2024   magnesium gluconate (MAGONATE) 500 mg tablet  Self Yes No   Sig: Take 500 mg by mouth 2 (two) times a day   mometasone (ELOCON) 0.1 % cream  Self No No   Sig: Apply topically daily   montelukast (SINGULAIR) 10 mg tablet  Self No No   Sig: Take 1 tablet (10 mg total) by mouth daily at bedtime   Patient taking differently: Take 10 mg by mouth 2 (two) times a day   predniSONE 20 mg tablet  Self No No   Sig: Take 40mg per day for 1 week, then 30mg per day for 1 week, then 20mg per day for 1 week and then 10mg per day for 1 week.      Facility-Administered Medications: None     Discharge Medication List as of 1/30/2025  9:36 PM        START taking these medications    Details   diazepam (VALIUM) 5 mg tablet Take 1 tablet (5 mg total) by mouth every 8 (eight) hours as needed for muscle spasms for up to 6 doses, Starting Thu 1/30/2025, Normal           CONTINUE these medications which have NOT CHANGED    Details   Ascorbic Acid (vitamin C) 1000 MG tablet Take 500 mg by mouth 2 (two) times a day, Historical Med      B Complex Vitamins (VITAMIN B COMPLEX PO) Take 100 mg by mouth daily, Historical Med      cetirizine (ZyrTEC) 10 mg tablet Take 20 mg by mouth 2 (two) times a day,  Historical Med      cholecalciferol (VITAMIN D3) 1,000 units tablet Take 5,000 Units by mouth daily, Historical Med      cyanocobalamin (VITAMIN B-12) 100 mcg tablet Take by mouth daily, Historical Med      diphenhydrAMINE (BENADRYL) 50 mg capsule Take 50 mg by mouth 4 (four) times a day as needed for itching Pt states takes 50mg daily, but may go up to 4 times daily prn, Historical Med      EPINEPHrine (EPIPEN) 0.3 mg/0.3 mL SOAJ Inject 0.3 mL (0.3 mg total) into a muscle daily as needed for anaphylaxis No substitutions with generic, Starting Wed 9/4/2024, Normal      famotidine (PEPCID) 40 MG tablet TAKE ONE TABLET BY MOUTH ONCE DAILY, Starting Thu 1/9/2025, Normal      furosemide (LASIX) 20 mg tablet Take 20 mg by mouth if needed, Starting Mon 5/6/2024, Historical Med      hydrOXYzine HCL (ATARAX) 25 mg tablet Take 1 tablet (25 mg total) by mouth daily as needed for itching, Starting Wed 5/22/2024, Fill Later      hyoscyamine (ANASPAZ,LEVSIN) 0.125 MG tablet Take 0.125 mg by mouth every 6 (six) hours as needed, Starting Mon 3/18/2024, Historical Med      Lactobacillus (PROBIOTIC ACIDOPHILUS PO) Take by mouth, Historical Med      Lactobacillus-Inulin ([facility]) CAPS Take 200 mg by mouth daily, Historical Med      levalbuterol (XOPENEX HFA) 45 mcg/act inhaler Inhale 1-2 puffs every 4 (four) hours as needed for shortness of breath, Starting Tue 5/23/2023, Normal      levothyroxine 50 mcg tablet Take 75 mcg by mouth daily, Starting Thu 2/1/2024, Historical Med      liothyronine (CYTOMEL) 5 mcg tablet Take 1 tablet (5 mcg total) by mouth daily Do not start before June 14, 2023., Starting Wed 6/14/2023, Normal      magnesium gluconate (MAGONATE) 500 mg tablet Take 500 mg by mouth 2 (two) times a day, Historical Med      mometasone (ELOCON) 0.1 % cream Apply topically daily, Starting Thu 3/21/2024, Normal      montelukast (SINGULAIR) 10 mg tablet Take 1 tablet (10 mg total) by mouth daily at bedtime,  Starting Tue 8/15/2023, No Print      PEPPERMINT OIL PO Take 1 capsule by mouth 2 (two) times a day, Starting Mon 3/18/2024, Historical Med      predniSONE 20 mg tablet Take 40mg per day for 1 week, then 30mg per day for 1 week, then 20mg per day for 1 week and then 10mg per day for 1 week., Normal      Probiotic Product (CULTURELLE ABDOMINAL SUPPORT PO) Take 200 mg by mouth daily, Historical Med      Riboflavin (Vitamin B-2) 100 MG TABS Take 200 mg by mouth 2 (two) times a day, Historical Med      Upadacitinib ER (Rinvoq) 45 MG TB24 Take 45 mg by mouth daily Induction dosing, Starting Fri 11/29/2024, Normal             ED SEPSIS DOCUMENTATION   Time reflects when diagnosis was documented in both MDM as applicable and the Disposition within this note       Time User Action Codes Description Comment    1/30/2025  4:02 PM Grzegorz Lopez [M54.50] Acute bilateral low back pain without sciatica     1/30/2025  6:15 PM Grzegorz Lopez [R93.5] Abnormal abdominal CT scan     1/30/2025  6:15 PM Grzegorz Lopez [K83.8] Dilation of common bile duct     1/30/2025  8:38 PM Grzegorz Lopez [M51.26] Herniation of intervertebral disc of lumbar spine without radiculopathy     1/30/2025  8:39 PM Grzegorz Lopez Modify [M54.50] Acute bilateral low back pain without sciatica     1/30/2025  8:39 PM Grzegorz Lopez Modify [M51.26] Herniation of intervertebral disc of lumbar spine without radiculopathy                  Grzegorz Lopez DO  01/31/25 0126

## 2025-01-30 NOTE — Clinical Note
Celine Roa was seen and treated in our emergency department on 1/30/2025.                Diagnosis:     Celine  .    She may return on this date: 02/03/2025    Patient seen and examined in the emergency department on 1/30/2025 please excuse from work due to acute symptoms may return on 2/3/2025.     If you have any questions or concerns, please don't hesitate to call.      Grzegorz Lopez, DO    ______________________________           _______________          _______________  Hospital Representative                              Date                                Time

## 2025-01-30 NOTE — PROGRESS NOTES
Pt tolerated hydration and pre-meds well. 1326 pt informed RN she was woken up by 10/10 sharp pressure pain in mid lower back. Pt states she's been voiding more frequently although doesn't feel like she's completely emptying. 1.5L isolyte fluids stopped with 30 mins left d/t pt requesting to be seen in ED. PIV removed without incident. RN transported pt to ED in w/c without incident.      Celine Roa is aware of future appt on 2/12/25 at 9:30AM.      AVS declined by Celine Roa.

## 2025-01-30 NOTE — Clinical Note
Celine Roa was seen and treated in our emergency department on 1/30/2025.                Diagnosis:     Celine  .    She may return on this date: 02/04/2025    Patient seen and examined in the emergency department on 1/30/2025 please excuse from work due to acute symptoms may return on 2/4/2025.     If you have any questions or concerns, please don't hesitate to call.      Grzegorz Lopez, DO    ______________________________           _______________          _______________  Hospital Representative                              Date                                Time

## 2025-01-31 NOTE — DISCHARGE INSTRUCTIONS
Ice, heat alternating  Tylenol for pain   Valium for spasms  Contact [facility] for follow up   Contact PCP for follow up

## 2025-02-03 DIAGNOSIS — M51.26 HERNIATION OF INTERVERTEBRAL DISC OF LUMBAR SPINE WITHOUT RADICULOPATHY: ICD-10-CM

## 2025-02-03 RX ORDER — DIAZEPAM 5 MG/1
5 TABLET ORAL EVERY 8 HOURS PRN
Qty: 6 TABLET | Refills: 0 | Status: SHIPPED | OUTPATIENT
Start: 2025-02-03

## 2025-02-05 ENCOUNTER — HOSPITAL ENCOUNTER (OUTPATIENT)
Dept: INFUSION CENTER | Facility: HOSPITAL | Age: 50
Discharge: HOME | End: 2025-02-05

## 2025-02-05 ENCOUNTER — TELEPHONE (OUTPATIENT)
Dept: FAMILY MEDICINE CLINIC | Facility: HOME HEALTHCARE | Age: 50
End: 2025-02-05

## 2025-02-07 ENCOUNTER — PREP FOR PROCEDURE (OUTPATIENT)
Age: 50
End: 2025-02-07

## 2025-02-07 ENCOUNTER — TELEPHONE (OUTPATIENT)
Age: 50
End: 2025-02-07

## 2025-02-07 ENCOUNTER — OFFICE VISIT (OUTPATIENT)
Age: 50
End: 2025-02-07
Payer: COMMERCIAL

## 2025-02-07 VITALS
WEIGHT: 160 LBS | HEIGHT: 62 IN | OXYGEN SATURATION: 98 % | HEART RATE: 110 BPM | SYSTOLIC BLOOD PRESSURE: 138 MMHG | RESPIRATION RATE: 20 BRPM | BODY MASS INDEX: 29.44 KG/M2 | TEMPERATURE: 98.5 F | DIASTOLIC BLOOD PRESSURE: 88 MMHG

## 2025-02-07 DIAGNOSIS — K21.9 GASTROESOPHAGEAL REFLUX DISEASE, UNSPECIFIED WHETHER ESOPHAGITIS PRESENT: Primary | ICD-10-CM

## 2025-02-07 DIAGNOSIS — M85.80 OSTEOPENIA: ICD-10-CM

## 2025-02-07 DIAGNOSIS — M51.26 HERNIATION OF INTERVERTEBRAL DISC OF LUMBAR SPINE WITHOUT RADICULOPATHY: ICD-10-CM

## 2025-02-07 DIAGNOSIS — M51.16 LUMBAR DISC HERNIATION WITH RADICULOPATHY: ICD-10-CM

## 2025-02-07 DIAGNOSIS — Z12.11 SCREENING FOR COLON CANCER: Primary | ICD-10-CM

## 2025-02-07 DIAGNOSIS — Z87.81 HISTORY OF FRACTURE: Primary | ICD-10-CM

## 2025-02-07 PROCEDURE — 99203 OFFICE O/P NEW LOW 30 MIN: CPT | Performed by: PHYSICIAN ASSISTANT

## 2025-02-07 RX ORDER — SODIUM CHLORIDE, SODIUM LACTATE, POTASSIUM CHLORIDE, CALCIUM CHLORIDE 600; 310; 30; 20 MG/100ML; MG/100ML; MG/100ML; MG/100ML
125 INJECTION, SOLUTION INTRAVENOUS CONTINUOUS
OUTPATIENT
Start: 2025-02-07

## 2025-02-07 RX ORDER — METHOCARBAMOL 750 MG/1
750 TABLET, FILM COATED ORAL EVERY 8 HOURS PRN
Qty: 20 TABLET | Refills: 0 | Status: SHIPPED | OUTPATIENT
Start: 2025-02-07

## 2025-02-07 NOTE — LETTER
February 7, 2025     Patient: Celine Roa  YOB: 1975  Date of Visit: 2/7/2025      To Whom it May Concern:    Celine Roa is under my professional care. Celine was seen in my office on 2/7/2025.  She may return to work with the below restrictions:   - Avoid bending/twisting  - No heavy lifting greater than 5 lbs  - Permission to take breaks as needed to avoid prolonged sitting or standing    If you have any questions or concerns, please don't hesitate to call.          Sincerely,          Chirstine Saavedra PA-C        CC: No Recipients

## 2025-02-07 NOTE — ASSESSMENT & PLAN NOTE
Patient presents today as a new patient for evaluation of back pain and lower extremity symptoms.  She sustained a slip and fall off a desk chair developing progressive back pain.  Now with radicular tingling down the medial aspect of her left leg and groin as well as a sensation of a tourniquet and paresthesias of her right lower leg.  Developed urinary frequency and urgency with some improvement  Known history of left foot drop. Trips on steps and changes in ilene    Imaging:   CT recon lumbar spine 1/30/25: New large right paracentral posterior disc extrusion at L5/S1 extending inferiorly from the disc space possibly contacting transversing right S1 nerve root.  Age indeterminant mild superior endplate compression deformity anterior L3 vertebral body which is stable compared to CT imaging completed in February 7, 2024.    Plan:   Continue to monitor pain and neurological symptoms  CT imaging reviewed in detail with patient demonstrating a right-sided L5-S1 disc herniation with advanced degenerative changes at L5-S1.  Given evidence of disc herniation on CT imaging as well as ongoing back pain limiting activities, lower extremity paresthesias despite use of Tylenol and Valium, MRI lumbar spine without contrast ordered.  Discussed options for conservative management including medications, physical therapy and interventional pain management with injections.  Unfortunately medications may be limited in this patient given her allergy to corn oil which acts as a binding agent and most oral medications.  We discussed options for muscle relaxers as well as steroids.  Patient is currently on prednisone for Crohn's disease.  She believes she tolerated Robaxin in the past.  A one-time prescription provided.  Patient aware not to take at the same time as valium.   Patient to have additional follow-up with her PCP for discussion of medication options.  Patient should continue to limit activities as tolerated and avoid  bending, twisting and heavy lifting.  Patient to follow-up with neurosurgical office after completion of MRI lumbar spine.  She is aware to call the office with any questions or concerns in the interim.

## 2025-02-07 NOTE — PROGRESS NOTES
Name: Celine Roa      : 1975      MRN: [id number]  Encounter Provider: Christine Saavedra PA-C  Encounter Date: 2025   Encounter department: [facility]  :  Assessment & Plan  Lumbar disc herniation with radiculopathy  Patient presents today as a new patient for evaluation of back pain and lower extremity symptoms.  She sustained a slip and fall off a desk chair developing progressive back pain.  Now with radicular tingling down the medial aspect of her left leg and groin as well as a sensation of a tourniquet and paresthesias of her right lower leg.  Developed urinary frequency and urgency with some improvement  Known history of left foot drop. Trips on steps and changes in ilene    Imaging:   CT recon lumbar spine 25: New large right paracentral posterior disc extrusion at L5/S1 extending inferiorly from the disc space possibly contacting transversing right S1 nerve root.  Age indeterminant mild superior endplate compression deformity anterior L3 vertebral body which is stable compared to CT imaging completed in 2024.    Plan:   Continue to monitor pain and neurological symptoms  CT imaging reviewed in detail with patient demonstrating a right-sided L5-S1 disc herniation with advanced degenerative changes at L5-S1.  Given evidence of disc herniation on CT imaging as well as ongoing back pain limiting activities, lower extremity paresthesias despite use of Tylenol and Valium, MRI lumbar spine without contrast ordered.  Discussed options for conservative management including medications, physical therapy and interventional pain management with injections.  Unfortunately medications may be limited in this patient given her allergy to corn oil which acts as a binding agent and most oral medications.  We discussed options for muscle relaxers as well as steroids.  Patient is currently on prednisone for Crohn's disease.  She believes she tolerated  Robaxin in the past.  A one-time prescription provided.  Patient aware not to take at the same time as valium.   Patient to have additional follow-up with her PCP for discussion of medication options.  Patient should continue to limit activities as tolerated and avoid bending, twisting and heavy lifting.  Patient to follow-up with neurosurgical office after completion of MRI lumbar spine.  She is aware to call the office with any questions or concerns in the interim.           Herniation of intervertebral disc of lumbar spine without radiculopathy    Orders:    Ambulatory Referral to Neurosurgery    methocarbamol (Robaxin-750) 750 mg tablet; Take 1 tablet (750 mg total) by mouth every 8 (eight) hours as needed for muscle spasms    DXA bone density spine hip and pelvis; Future    MRI lumbar spine without contrast; Future    History of fracture    Orders:    DXA bone density spine hip and pelvis; Future    Osteopenia  Last DEXA scan 2021.  Updated DEXA scan ordered in the setting of prior L3 superior endplate fracture.    Orders:    DXA bone density spine hip and pelvis; Future        History of Present Illness   This is a 49-year-old female with past medical history significant for thrombocytosis, seizures, rheumatoid arthritis CKD, Crohn's disease, ulcerative colitis, history of DVT, thyroid disease, and white matter disease (prior dx MS) who presents today as a new patient for evaluation of back pain and lower extremity symptoms.  Patient states approximately 2 and half weeks ago she was shredding paper sitting on a computer chair.  When the shredder jam she bent over to fix it causing the chair to roll out from under her.  She fell with her right thigh landing on the shredding box.  She noted soreness in her back for several days.  Similar episode happened again approximately 4 days later where she tried to catch herself from laying out of the chair.  She developed progressive back pain described as a soreness.   That day she had also received Xolair injections in her back which typically cause soreness.  She felt her discomfort may be related to her medication injections.  2 days later the patient was in the infusion center and needed to get up frequently to void.  She developed progressive pain with the continuous change in position to the point where she was unable to get up.  She was taken to the emergency room where she was originally evaluated for kidney stones.  CT recon lumbar spine demonstrated a large right paracentral L5/S1 disc herniation.  For these reasons she was referred to [facility] neurosurgery for ongoing evaluation.    Today, patient continues with significant back pain which is slightly improved with use of Tylenol and Valium as needed.  She has adjusted movements and manner in which she completes normal activities to try to limit the pain.  For example she rolled out of bed to her knees and pushes herself up rather than simply going from sitting to standing which causes exacerbation.  She has difficulties even lifting a full gallon of milk or ice tea out of the refrigerator secondary to her pain.  She describes a tingling on the left posterior medial leg and groin.  She drives a sensation of the tourniquet at her right knee with painful paresthesias and discomfort below that.  Symptoms can be exacerbated with specific positions and movement.  Pain is exacerbated with any prolonged standing or sitting.  She is more comfortable sitting resting on her right side.  At best her pain is rated 4/10 approximately 1 hour after Tylenol and value and at worst with exacerbation and movement 9-10/10.  She finds it difficult completing normal activities of daily living.    Of note, patient works at [employer] and has been out of work since evaluation in the emergency department as advised given physical and active nature of her job.       Review of Systems   Constitutional: Negative.    HENT: Negative.     Eyes:  Negative.    Respiratory: Negative.     Cardiovascular: Negative.    Gastrointestinal: Negative.    Endocrine: Negative.    Genitourinary: Negative.    Musculoskeletal:  Positive for back pain (across the lower back shooting pain down bi leg mostly on the left) and gait problem (left foot drop).   Allergic/Immunologic: Negative.    Neurological:  Positive for weakness (when getting from sitting or laying position) and numbness (ocassionlly).   Psychiatric/Behavioral:  Positive for sleep disturbance (due to pain).     I have personally reviewed the MA's review of systems and made changes as necessary.    Past Medical History   Past Medical History:   Diagnosis Date    Anemia 2007    Anxiety     Asthma     Bile duct stricture 2014    Sphincter of oddi dysfunction    Chronic kidney disease     Closed bimalleolar fracture 02/22/2024    Colon polyp 1998    Crohn's colitis (HCC)     Cyst of ovary 02/22/2024    Deep vein thrombosis (HCC)     Disease of thyroid gland     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 pt denies, documented in cardio note    Pain of right thigh 02/13/2023    Pancreatitis     sphinger of     Psychiatric disorder     RA (rheumatoid arthritis) (HCC)     Seizures (HCC)     Sepsis without acute organ dysfunction (HCC) 2/15/2024    SIRS (systemic inflammatory response syndrome) (HCC) 8/25/2017    Thrombocytosis 04/05/2022    Ulcerative colitis (HCC)     Visual impairment      Past Surgical History:   Procedure Laterality Date    ABDOMINAL SURGERY  2017    APPENDECTOMY      CHOLECYSTECTOMY      COLONOSCOPY  2019    CYSTOSCOPY N/A 06/08/2023    Procedure: CYSTOSCOPY, mini TURBT with  fulgeration;  Surgeon: Tee Bruno MD;  Location: [facility];  Service: Urology    EGD AND COLONOSCOPY N/A 09/12/2017    Procedure: EGD AND COLONOSCOPY;  Surgeon: Tommy Oliver MD;  Location: [facility];  Service: Gastroenterology    ERCP N/A 09/15/2017    Procedure: ENDOSCOPIC RETROGRADE CHOLANGIOPANCREATOGRAPHY (ERCP);  Surgeon: Dre Soto MD;  [facility];  Service: Gastroenterology    ERCP  02/14/2024    HYSTERECTOMY      KNEE SURGERY Right     LAPAROSCOPIC ENDOMETRIOSIS FULGURATION      ORIF ANKLE FRACTURE Left     CA ARTHRS KNE SURG W/MENISCECTOMY MED/LAT W/SHVG Left 05/12/2017    Procedure: POSSIBLE MEDIAL MENISECTOMY;  Surgeon: Kristian Avila MD;  Location: [facility];  Service: Orthopedics    CA ARTHRS KNEE DEBRIDEMENT/SHAVING ARTCLR CRTLG Left 05/12/2017    Procedure: KNEE ARTHROSCOPY EVALUATION, CHONDROPLASTY;  Surgeon: Kristian Avila MD;  Location: [facility];  Service: Orthopedics    CA LAPS ABD PRTM&OMENTUM DX W/WO SPEC BR/WA SPX N/A 12/12/2017    Procedure: LAPAROSCOPY DIAGNOSTIC  WITH LYSIS OF ADHESIONS;  Surgeon: Olaf John DO;  [facility];  Service: General    UPPER GASTROINTESTINAL ENDOSCOPY  2019     Family History   Problem Relation Age of Onset    Ulcerative colitis Mother     Arthritis Mother     Colon polyps Mother     Heart failure Father     Neuropathy Father     Macular degeneration Father     Diabetes Father     Early death Father     Vision loss Father     Asthma Daughter     Hypothyroidism Daughter     Heart disease Maternal Grandmother     Arthritis Maternal Grandmother     No Known Problems Maternal Grandfather     Arthritis Paternal Grandmother     Macular degeneration Paternal Grandmother     Vision loss Paternal Grandmother     Lymphoma Paternal Grandfather     Cancer Paternal Grandfather     Early death Paternal Grandfather     Breast cancer Maternal Aunt     Cancer Maternal Aunt     Miscarriages / Stillbirths Maternal Aunt     Heart failure Paternal Aunt      Heart failure Paternal Aunt     Irritable bowel syndrome Paternal Aunt     Breast cancer Paternal Aunt 54    Breast cancer additional onset Paternal Aunt 58    Hypothyroidism Paternal Aunt     Cancer Paternal Aunt     No Known Problems Son     No Known Problems Son     Kidney disease Brother     Depression Paternal Uncle     Early death Paternal Uncle     Depression Paternal Uncle     Early death Paternal Uncle       reports that she has never smoked. She has never used smokeless tobacco. She reports that she does not drink alcohol and does not use drugs.  Current Outpatient Medications on File Prior to Visit   Medication Sig Dispense Refill    Ascorbic Acid (vitamin C) 1000 MG tablet Take 500 mg by mouth 2 (two) times a day      B Complex Vitamins (VITAMIN B COMPLEX PO) Take 100 mg by mouth daily      cetirizine (ZyrTEC) 10 mg tablet Take 20 mg by mouth 2 (two) times a day      cholecalciferol (VITAMIN D3) 1,000 units tablet Take 5,000 Units by mouth daily      cyanocobalamin (VITAMIN B-12) 100 mcg tablet Take by mouth daily      diazepam (VALIUM) 5 mg tablet Take 1 tablet (5 mg total) by mouth every 8 (eight) hours as needed for muscle spasms for up to 6 doses 6 tablet 0    diphenhydrAMINE (BENADRYL) 50 mg capsule Take 50 mg by mouth 4 (four) times a day as needed for itching Pt states takes 50mg daily, but may go up to 4 times daily prn      EPINEPHrine (EPIPEN) 0.3 mg/0.3 mL SOAJ Inject 0.3 mL (0.3 mg total) into a muscle daily as needed for anaphylaxis No substitutions with generic 0.6 mL 3    famotidine (PEPCID) 40 MG tablet TAKE ONE TABLET BY MOUTH ONCE DAILY 90 tablet 1    furosemide (LASIX) 20 mg tablet Take 20 mg by mouth if needed      hydrOXYzine HCL (ATARAX) 25 mg tablet Take 1 tablet (25 mg total) by mouth daily as needed for itching 30 tablet 1    hyoscyamine (ANASPAZ,LEVSIN) 0.125 MG tablet Take 0.125 mg by mouth every 6 (six) hours as needed      Lactobacillus (PROBIOTIC ACIDOPHILUS PO) Take by  mouth      Lactobacillus-Inulin (Culturelle Digestive Health) CAPS Take 200 mg by mouth daily      levalbuterol (XOPENEX HFA) 45 mcg/act inhaler Inhale 1-2 puffs every 4 (four) hours as needed for shortness of breath 45 g 2    levothyroxine 50 mcg tablet Take 75 mcg by mouth daily      magnesium gluconate (MAGONATE) 500 mg tablet Take 500 mg by mouth 2 (two) times a day      mometasone (ELOCON) 0.1 % cream Apply topically daily 45 g 1    montelukast (SINGULAIR) 10 mg tablet Take 1 tablet (10 mg total) by mouth daily at bedtime (Patient taking differently: Take 10 mg by mouth 2 (two) times a day)  0    PEPPERMINT OIL PO Take 1 capsule by mouth 2 (two) times a day      predniSONE 20 mg tablet Take 40mg per day for 1 week, then 30mg per day for 1 week, then 20mg per day for 1 week and then 10mg per day for 1 week. 100 tablet 0    Probiotic Product (CULTURELLE ABDOMINAL SUPPORT PO) Take 200 mg by mouth daily      Riboflavin (Vitamin B-2) 100 MG TABS Take 200 mg by mouth 2 (two) times a day      liothyronine (CYTOMEL) 5 mcg tablet Take 1 tablet (5 mcg total) by mouth daily Do not start before June 14, 2023. (Patient not taking: Reported on 11/18/2024) 30 tablet 0    Upadacitinib ER (Rinvoq) 45 MG TB24 Take 45 mg by mouth daily Induction dosing 28 tablet 2     No current facility-administered medications on file prior to visit.     Allergies   Allergen Reactions    Aimovig [Erenumab-Aooe] Anaphylaxis    Amitriptyline Anaphylaxis     According to the patient she had nphylaxis    Aspergillus Allergy Skin Test Other (See Comments), Hives and Swelling    Azathioprine Other (See Comments) and Anaphylaxis     pancreatitis  According to patient she needed Epipen    Buspar [Buspirone] Hives and Shortness Of Breath    Citric Acid-Polysorbate 80 Abdominal Pain, Anaphylaxis, Anxiety, Bradycardia, Diarrhea, Fatigue, GI Intolerance, Headache, Hives, Hyperactivity, Itching, Lip Swelling, Nausea Only, Palpitations, Rash, Shortness Of  "Breath, Tachycardia, Throat Swelling, Tongue Swelling and Vomiting    Corn Dextrin Anaphylaxis     Any corn based products    Erenumab Anaphylaxis     patient sent portal message stating she had anaphylaxis  patient sent portal message stating she had anaphylaxis      Gadolinium Abdominal Pain, Anaphylaxis, Anxiety, Confusion, Delirium, Dizziness, Eye Swelling, Fatigue, GI Intolerance, Headache, Hives, Irritability, Itching, Lip Swelling, Nausea Only, Palpitations, Photosensitivity, Rash, Seizures, Shortness Of Breath, Swelling, Syncope, Throat Swelling, Tongue Swelling, Tremor, Visual Disturbance and Vomiting    Gelatin - Food Allergy Anaphylaxis    Heparin Hives     Painful welts     Lavender Oil Anaphylaxis     Other reaction(s): anaphalxis    Malto Dextrin [Dextrin] Anaphylaxis    Penicillium Notatum (Diagnost) Shortness Of Breath and Swelling    Red Dye - Food Allergy Anaphylaxis and Other (See Comments)     intolerance    Sumatriptan Anaphylaxis     Bradycardia, sob, back pressure, head pain,throat tightness  According to the patient she had anphylaxis    Ustekinumab (Murine) Anaphylaxis    Lidocaine Hives, Itching and Rash    Contrast [Iodinated Contrast Media] Other (See Comments)     Pt states needs to be premedicated prior to injection    Corn Oil - Food Allergy - Food Allergy Hives    Humira [Adalimumab] Other (See Comments)     \"I develop drug induced lupus\"    Ibuprofen Hives and Throat Swelling    Polyethylene Glycol Diarrhea and Vomiting    Remicade [Infliximab] Other (See Comments)     \"I develop drug induced lupus\"    Sulfa Antibiotics Hives and Other (See Comments)    Sulfate Hives    Sulfites - Food Allergy Hives    Sweet Corn Extract Skin Test - Food Allergy Hives and Itching    Chlorhexidine Itching    Freederm Adhesive Remover [New Skin] Rash    Histamine Hives, Rash and Other (See Comments)     Intolerance      Wound Dressings Rash         Objective   Wt 72.6 kg (160 lb)   BMI 29.03 kg/m² "     Physical Exam  Constitutional:       General: She is not in acute distress.     Appearance: Normal appearance. She is well-developed. She is not ill-appearing.   HENT:      Head: Normocephalic and atraumatic.      Right Ear: External ear normal.      Left Ear: External ear normal.      Nose: Nose normal.      Mouth/Throat:      Mouth: Mucous membranes are moist.   Eyes:      General: No scleral icterus.        Right eye: No discharge.         Left eye: No discharge.      Conjunctiva/sclera: Conjunctivae normal.   Cardiovascular:      Rate and Rhythm: Normal rate.   Pulmonary:      Effort: Pulmonary effort is normal. No respiratory distress.   Musculoskeletal:         General: Tenderness (low lumbar) present.   Skin:     General: Skin is warm and dry.      Findings: No rash.   Neurological:      Mental Status: She is alert.   Psychiatric:         Mood and Affect: Mood normal.         Speech: Speech normal.         Behavior: Behavior normal.         Thought Content: Thought content normal.         Judgment: Judgment normal.       Neurological Exam  Mental Status  Alert. Speech is normal. Language is fluent with no aphasia. Attention and concentration are normal.    Cranial Nerves  CN V: Facial sensation is normal.  CN VII: Full and symmetric facial movement.    Motor  Normal muscle bulk throughout. Normal muscle tone. Strength is 5/5 in all four extremities except as noted.  Mild left foot drop.    Sensory  Light touch is normal in upper and lower extremities.     Reflexes    Right pathological reflexes: Kristen's absent. Ankle clonus absent.  Left pathological reflexes: Kristen's absent. Ankle clonus absent.    Gait  Casual gait is normal including stance, stride, and arm swing. Able to complete.

## 2025-02-07 NOTE — TELEPHONE ENCOUNTER
02/07/25  Screened by: Letha Echavarria    Referring Provider Dr. Levine    Pre- Screening:     There is no height or weight on file to calculate BMI.29.03  Has patient been referred for a routine screening Colonoscopy? yes  Is the patient between 45-75 years old? yes      Previous Colonoscopy yes   If yes:    Date: 2023    Facility:     Reason:         Does the patient want to see a Gastroenterologist prior to their procedure OR are they having any GI symptoms? yes    Has the patient been hospitalized or had abdominal surgery in the past 6 months? yes    Does the patient use supplemental oxygen? no    Does the patient take Coumadin, Lovenox, Plavix, Elliquis, Xarelto, or other blood thinning medication? no    Has the patient had a stroke, cardiac event, or stent placed in the past year? no        If patient is between 45yrs - 49yrs, please advise patient that we will have to confirm benefits & coverage with their insurance company for a routine screening colonoscopy.

## 2025-02-07 NOTE — TELEPHONE ENCOUNTER
Spoke with the patient.     Having heartburn and chest pain.     Can we add on an EGD to her upcoming colonoscopy?

## 2025-02-07 NOTE — ASSESSMENT & PLAN NOTE
Last DEXA scan 2021.  Updated DEXA scan ordered in the setting of prior L3 superior endplate fracture.    Orders:    DXA bone density spine hip and pelvis; Future

## 2025-02-07 NOTE — TELEPHONE ENCOUNTER
Patients GI provider:  Dr. Levine    Number to return call: ([phone]    Reason for call: Pt calling to speak with Dr. Levine about having an EGD done when she has her colonoscopy. Pt would like a call back to discuss.    Scheduled procedure/appointment date if applicable: procedure  2/25/25

## 2025-02-07 NOTE — TELEPHONE ENCOUNTER
Scheduled date of colonoscopy (as of today):  2/25/25    Physician performing colonoscopy:  Dr. Levine    Location of colonoscopy:  [facility]    Bowel prep reviewed with patient:    [email or web address]*

## 2025-02-11 ENCOUNTER — ANESTHESIA EVENT (OUTPATIENT)
Dept: ANESTHESIOLOGY | Facility: HOSPITAL | Age: 50
End: 2025-02-11

## 2025-02-11 ENCOUNTER — ANESTHESIA (OUTPATIENT)
Dept: ANESTHESIOLOGY | Facility: HOSPITAL | Age: 50
End: 2025-02-11

## 2025-02-11 ENCOUNTER — OFFICE VISIT (OUTPATIENT)
Dept: BEHAVIORAL/MENTAL HEALTH CLINIC | Facility: CLINIC | Age: 50
End: 2025-02-11
Payer: COMMERCIAL

## 2025-02-11 DIAGNOSIS — F41.1 GENERALIZED ANXIETY DISORDER: Primary | ICD-10-CM

## 2025-02-11 DIAGNOSIS — F33.41 RECURRENT MAJOR DEPRESSIVE DISORDER, IN PARTIAL REMISSION (HCC): ICD-10-CM

## 2025-02-11 PROCEDURE — 90791 PSYCH DIAGNOSTIC EVALUATION: CPT

## 2025-02-11 NOTE — PSYCH
Behavioral Health Psychotherapy Assessment    Date of Initial Psychotherapy Assessment: 02/18/25  Referral Source: Dr. Lewis   Has a release of information been signed for the referral source? NA    Preferred Name: Celine Roa  Preferred Pronouns: She/her  YOB: 1975 Age: 49 y.o.  Sex assigned at birth: female   Gender Identity: female  Race:   Preferred Language: English    Emergency Contact:  Full Name: Armando   Relationship to Client: spouse  Contact information: [phone]    Primary Care Physician:  Olaf Rhodes MD  [address] [place] [address]  [phone]  Has a release of information been signed? NA    Physical Health History:  Past surgical procedures: see chart  Do you have a history of any of the following: seizures and thyroid disease  Do you have any mobility issues? No    Relevant Family History:  None noted     Presenting Problem (What brings you in?)  Chronic medical issues and family dyanmics     Mental Health Advance Directive:  Do you currently have a Mental Health Advance Directive?no    Diagnosis:   Diagnosis ICD-10-CM Associated Orders   1. Generalized anxiety disorder  F41.1       2. Recurrent major depressive disorder, in partial remission (HCC)  F33.41           Initial Assessment:     Current Mental Status:    Appearance: appropriate      Behavior/Manner: tearful and guarded      Affect/Mood:  Good    Speech:  Normal    Sleep:  Normal    Oriented to: oriented to self, oriented to place and oriented to time       Clinical Symptoms    Depression: yes      Anxiety: yes      Depression Symptoms: depressed mood and irritable      Anxiety Symptoms: irritable and nervous/anxious      Have you ever been assaultive to others or the environment: No      Have you ever been self-injurious: No      Counseling History:  Previous Counseling or Treatment  (Mental Health or Drug & Alcohol): Yes    Previous Counseling Details:  Currently with Dr. Lewis  and had therapy previously virtually and she struggled with connection on the virtual platform  She did have an inpatient stay due to SI  Have you previously taken psychiatric medications: Yes    Previous Medications Attempted:  See chart    Suicide Risk Assessment  Have you ever had a suicide attempt: No    Have you had incidents of suicidal ideation: Yes    Are you currently experiencing suicidal thoughts: No    Additional Suicide Risk Information:  Was hospitalized due to SI    Substance Abuse/Addiction Assessment:  Alcohol: No    Heroin: No    Fentanyl: No    Opiates: No    Cocaine: No    Amphetamines: No    Hallucinogens: No    Club Drugs: No    Other Rx Meds: No    Marijuana: No    Tobacco/Nicotine: No    Have you experienced blackouts as a result of substance use: No    Have you had any periods of abstinence: No    Have you experienced symptoms of withdrawal: No    Have you ever overdosed on any substances?: No    Are you currently using any Medication Assisted Treatment for Substance Use: No      Disordered Eating History:  Do you have a history of disordered eating: No      Social Determinants of Health:    SDOH:  None    Trauma and Abuse History:    Have you ever been abused: No      Legal History:    Have you ever been arrested  or had a DUI: No      Have you been incarcerated: No      Are you currently on parole/probation: No      Any current Children and Youth involvement: No      Any pending legal charges: No      Relationship History:    Current marital status:       Natural Supports:  Mother and friends    Employment History    Are you currently employed: Yes      Employer/ Job title:  [employer]    Sources of income/financial support:  Work and family members     History:      Status: no history of  duty  Educational History:     Have you ever been diagnosed with a learning disability: No      Do you need assistance with reading or writing: No      Recommended Treatment:      Psychotherapy:  Individual sessions    Frequency:  1 time    Session frequency:  Monthly      Visit start and stop times:    02/11/25  Start Time: 1600  Stop Time: 1650  Total Visit Time: 50 minutes

## 2025-02-14 ENCOUNTER — APPOINTMENT (OUTPATIENT)
Dept: LAB | Facility: HOSPITAL | Age: 50
End: 2025-02-14
Attending: INTERNAL MEDICINE
Payer: COMMERCIAL

## 2025-02-14 ENCOUNTER — RESULTS FOLLOW-UP (OUTPATIENT)
Dept: GASTROENTEROLOGY | Facility: CLINIC | Age: 50
End: 2025-02-14

## 2025-02-14 ENCOUNTER — HOSPITAL ENCOUNTER (OUTPATIENT)
Dept: BONE DENSITY | Facility: HOSPITAL | Age: 50
Discharge: HOME | End: 2025-02-14
Payer: COMMERCIAL

## 2025-02-14 DIAGNOSIS — M51.26 HERNIATION OF INTERVERTEBRAL DISC OF LUMBAR SPINE WITHOUT RADICULOPATHY: ICD-10-CM

## 2025-02-14 DIAGNOSIS — K50.119 CROHN'S DISEASE OF COLON WITH COMPLICATION (HCC): Primary | ICD-10-CM

## 2025-02-14 DIAGNOSIS — R19.7 VOMITING AND DIARRHEA: Primary | ICD-10-CM

## 2025-02-14 DIAGNOSIS — Z87.81 HISTORY OF FRACTURE: ICD-10-CM

## 2025-02-14 DIAGNOSIS — R11.10 VOMITING AND DIARRHEA: Primary | ICD-10-CM

## 2025-02-14 DIAGNOSIS — M85.80 OSTEOPENIA: ICD-10-CM

## 2025-02-14 DIAGNOSIS — K50.119 CROHN'S DISEASE OF COLON WITH COMPLICATION (HCC): ICD-10-CM

## 2025-02-14 LAB
ALBUMIN SERPL BCG-MCNC: 4.5 G/DL (ref 3.5–5)
ALP SERPL-CCNC: 72 U/L (ref 34–104)
ALT SERPL W P-5'-P-CCNC: 33 U/L (ref 7–52)
ANION GAP SERPL CALCULATED.3IONS-SCNC: 11 MMOL/L (ref 4–13)
AST SERPL W P-5'-P-CCNC: 42 U/L (ref 13–39)
BASOPHILS # BLD MANUAL: 0.09 THOUSAND/UL (ref 0–0.1)
BASOPHILS NFR MAR MANUAL: 1 % (ref 0–1)
BILIRUB SERPL-MCNC: 0.3 MG/DL (ref 0.2–1)
BUN SERPL-MCNC: 14 MG/DL (ref 5–25)
CALCIUM SERPL-MCNC: 9.6 MG/DL (ref 8.4–10.2)
CHLORIDE SERPL-SCNC: 102 MMOL/L (ref 96–108)
CO2 SERPL-SCNC: 26 MMOL/L (ref 21–32)
CREAT SERPL-MCNC: 0.69 MG/DL (ref 0.6–1.3)
CRP SERPL QL: 8.5 MG/L
EOSINOPHIL # BLD MANUAL: 0 THOUSAND/UL (ref 0–0.4)
EOSINOPHIL NFR BLD MANUAL: 0 % (ref 0–6)
ERYTHROCYTE [DISTWIDTH] IN BLOOD BY AUTOMATED COUNT: 13.3 % (ref 11.6–15.1)
GFR SERPL CREATININE-BSD FRML MDRD: 101 ML/MIN/1.73SQ M
GIANT PLATELETS BLD QL SMEAR: PRESENT
GLUCOSE SERPL-MCNC: 94 MG/DL (ref 65–140)
HCT VFR BLD AUTO: 48.3 % (ref 34.8–46.1)
HGB BLD-MCNC: 15.8 G/DL (ref 11.5–15.4)
LYMPHOCYTES # BLD AUTO: 0.55 THOUSAND/UL (ref 0.6–4.47)
LYMPHOCYTES # BLD AUTO: 6 % (ref 14–44)
MCH RBC QN AUTO: 31.9 PG (ref 26.8–34.3)
MCHC RBC AUTO-ENTMCNC: 32.7 G/DL (ref 31.4–37.4)
MCV RBC AUTO: 97 FL (ref 82–98)
MONOCYTES # BLD AUTO: 0 THOUSAND/UL (ref 0–1.22)
MONOCYTES NFR BLD: 0 % (ref 4–12)
NEUTROPHILS # BLD MANUAL: 8.51 THOUSAND/UL (ref 1.85–7.62)
NEUTS BAND NFR BLD MANUAL: 1 % (ref 0–8)
NEUTS SEG NFR BLD AUTO: 92 % (ref 43–75)
PLATELET # BLD AUTO: 473 THOUSANDS/UL (ref 149–390)
PLATELET BLD QL SMEAR: ABNORMAL
PMV BLD AUTO: 9 FL (ref 8.9–12.7)
POTASSIUM SERPL-SCNC: 3.7 MMOL/L (ref 3.5–5.3)
PROT SERPL-MCNC: 7.2 G/DL (ref 6.4–8.4)
RBC # BLD AUTO: 4.96 MILLION/UL (ref 3.81–5.12)
RBC MORPH BLD: NORMAL
SODIUM SERPL-SCNC: 139 MMOL/L (ref 135–147)
WBC # BLD AUTO: 9.15 THOUSAND/UL (ref 4.31–10.16)

## 2025-02-14 PROCEDURE — 85007 BL SMEAR W/DIFF WBC COUNT: CPT

## 2025-02-14 PROCEDURE — 85027 COMPLETE CBC AUTOMATED: CPT

## 2025-02-14 PROCEDURE — 86140 C-REACTIVE PROTEIN: CPT

## 2025-02-14 PROCEDURE — 83993 ASSAY FOR CALPROTECTIN FECAL: CPT

## 2025-02-14 PROCEDURE — 80053 COMPREHEN METABOLIC PANEL: CPT

## 2025-02-14 PROCEDURE — 77080 DXA BONE DENSITY AXIAL: CPT

## 2025-02-14 PROCEDURE — 36415 COLL VENOUS BLD VENIPUNCTURE: CPT

## 2025-02-14 NOTE — TELEPHONE ENCOUNTER
Spoke with pt and confirming upcoming procedure with Dr. Levine on 02/25, pt does not have any questions at the time.

## 2025-02-17 ENCOUNTER — OFFICE VISIT (OUTPATIENT)
Dept: FAMILY MEDICINE CLINIC | Facility: HOME HEALTHCARE | Age: 50
End: 2025-02-17
Payer: COMMERCIAL

## 2025-02-17 VITALS
OXYGEN SATURATION: 96 % | HEIGHT: 62 IN | DIASTOLIC BLOOD PRESSURE: 76 MMHG | BODY MASS INDEX: 29.44 KG/M2 | SYSTOLIC BLOOD PRESSURE: 128 MMHG | WEIGHT: 160 LBS | RESPIRATION RATE: 18 BRPM | TEMPERATURE: 98.3 F | HEART RATE: 110 BPM

## 2025-02-17 DIAGNOSIS — N94.9 ADNEXAL CYST: ICD-10-CM

## 2025-02-17 DIAGNOSIS — K83.8 PNEUMOBILIA: ICD-10-CM

## 2025-02-17 DIAGNOSIS — Z12.31 ENCOUNTER FOR SCREENING MAMMOGRAM FOR MALIGNANT NEOPLASM OF BREAST: ICD-10-CM

## 2025-02-17 DIAGNOSIS — M54.50 ACUTE BILATERAL LOW BACK PAIN WITHOUT SCIATICA: Primary | ICD-10-CM

## 2025-02-17 LAB — CALPROTECTIN STL-MCNC: 316 ÂΜG/G

## 2025-02-17 PROCEDURE — 99214 OFFICE O/P EST MOD 30 MIN: CPT | Performed by: FAMILY MEDICINE

## 2025-02-18 ENCOUNTER — HOSPITAL ENCOUNTER (OUTPATIENT)
Dept: MRI IMAGING | Facility: HOSPITAL | Age: 50
Discharge: HOME | End: 2025-02-18
Payer: COMMERCIAL

## 2025-02-18 DIAGNOSIS — M51.26 HERNIATION OF INTERVERTEBRAL DISC OF LUMBAR SPINE WITHOUT RADICULOPATHY: ICD-10-CM

## 2025-02-18 DIAGNOSIS — D89.40 MAST CELL ACTIVATION SYNDROME (HCC): Primary | ICD-10-CM

## 2025-02-18 PROCEDURE — 72148 MRI LUMBAR SPINE W/O DYE: CPT

## 2025-02-19 ENCOUNTER — HOSPITAL ENCOUNTER (OUTPATIENT)
Dept: INFUSION CENTER | Facility: HOSPITAL | Age: 50
Discharge: HOME | End: 2025-02-19
Payer: COMMERCIAL

## 2025-02-19 VITALS
HEART RATE: 96 BPM | OXYGEN SATURATION: 100 % | SYSTOLIC BLOOD PRESSURE: 140 MMHG | DIASTOLIC BLOOD PRESSURE: 90 MMHG | TEMPERATURE: 97.2 F | RESPIRATION RATE: 17 BRPM

## 2025-02-19 DIAGNOSIS — D50.0 IRON DEFICIENCY ANEMIA DUE TO CHRONIC BLOOD LOSS: Primary | ICD-10-CM

## 2025-02-19 DIAGNOSIS — E61.1 IRON DEFICIENCY: ICD-10-CM

## 2025-02-19 DIAGNOSIS — R11.10 VOMITING AND DIARRHEA: ICD-10-CM

## 2025-02-19 DIAGNOSIS — R19.7 VOMITING AND DIARRHEA: ICD-10-CM

## 2025-02-19 DIAGNOSIS — R63.0 ANOREXIA: ICD-10-CM

## 2025-02-19 DIAGNOSIS — K50.119 CROHN'S DISEASE OF COLON WITH COMPLICATION (HCC): ICD-10-CM

## 2025-02-19 DIAGNOSIS — E44.1 MILD PROTEIN-CALORIE MALNUTRITION (HCC): ICD-10-CM

## 2025-02-19 PROCEDURE — 96376 TX/PRO/DX INJ SAME DRUG ADON: CPT

## 2025-02-19 PROCEDURE — 96365 THER/PROPH/DIAG IV INF INIT: CPT

## 2025-02-19 PROCEDURE — 96366 THER/PROPH/DIAG IV INF ADDON: CPT

## 2025-02-19 PROCEDURE — 96367 TX/PROPH/DG ADDL SEQ IV INF: CPT

## 2025-02-19 PROCEDURE — 96375 TX/PRO/DX INJ NEW DRUG ADDON: CPT

## 2025-02-19 RX ORDER — SODIUM CHLORIDE 9 MG/ML
20 INJECTION, SOLUTION INTRAVENOUS ONCE
Status: COMPLETED | OUTPATIENT
Start: 2025-02-19 | End: 2025-02-19

## 2025-02-19 RX ORDER — SODIUM CHLORIDE 9 MG/ML
20 INJECTION, SOLUTION INTRAVENOUS ONCE
OUTPATIENT
Start: 2025-03-05

## 2025-02-19 RX ORDER — SODIUM CHLORIDE 9 MG/ML
20 INJECTION, SOLUTION INTRAVENOUS ONCE
OUTPATIENT
Start: 2025-03-06

## 2025-02-19 RX ORDER — SODIUM CHLORIDE, SODIUM GLUCONATE, SODIUM ACETATE, POTASSIUM CHLORIDE, MAGNESIUM CHLORIDE, SODIUM PHOSPHATE, DIBASIC, AND POTASSIUM PHOSPHATE .53; .5; .37; .037; .03; .012; .00082 G/100ML; G/100ML; G/100ML; G/100ML; G/100ML; G/100ML; G/100ML
1500 INJECTION, SOLUTION INTRAVENOUS ONCE
Status: CANCELLED
Start: 2025-02-20 | End: 2025-02-20

## 2025-02-19 RX ORDER — METHYLPREDNISOLONE SODIUM SUCCINATE 40 MG/ML
40 INJECTION, POWDER, LYOPHILIZED, FOR SOLUTION INTRAMUSCULAR; INTRAVENOUS ONCE
Status: COMPLETED | OUTPATIENT
Start: 2025-02-19 | End: 2025-02-19

## 2025-02-19 RX ORDER — METHYLPREDNISOLONE SODIUM SUCCINATE 40 MG/ML
40 INJECTION, POWDER, LYOPHILIZED, FOR SOLUTION INTRAMUSCULAR; INTRAVENOUS ONCE
Status: CANCELLED
Start: 2025-02-20 | End: 2025-02-20

## 2025-02-19 RX ORDER — SODIUM CHLORIDE, SODIUM GLUCONATE, SODIUM ACETATE, POTASSIUM CHLORIDE, MAGNESIUM CHLORIDE, SODIUM PHOSPHATE, DIBASIC, AND POTASSIUM PHOSPHATE .53; .5; .37; .037; .03; .012; .00082 G/100ML; G/100ML; G/100ML; G/100ML; G/100ML; G/100ML; G/100ML
1500 INJECTION, SOLUTION INTRAVENOUS ONCE
Status: COMPLETED | OUTPATIENT
Start: 2025-02-19 | End: 2025-02-19

## 2025-02-19 RX ORDER — DIPHENHYDRAMINE HYDROCHLORIDE 50 MG/ML
50 INJECTION INTRAMUSCULAR; INTRAVENOUS EVERY 6 HOURS PRN
Qty: 30 ML | Refills: 3 | Status: SHIPPED | OUTPATIENT
Start: 2025-02-19 | End: 2025-03-21

## 2025-02-19 RX ADMIN — SODIUM CHLORIDE, SODIUM GLUCONATE, SODIUM ACETATE, POTASSIUM CHLORIDE, MAGNESIUM CHLORIDE, SODIUM PHOSPHATE, DIBASIC, AND POTASSIUM PHOSPHATE 1500 ML: .53; .5; .37; .037; .03; .012; .00082 INJECTION, SOLUTION INTRAVENOUS at 09:29

## 2025-02-19 RX ADMIN — VEDOLIZUMAB 300 MG: 300 INJECTION, POWDER, LYOPHILIZED, FOR SOLUTION INTRAVENOUS at 12:05

## 2025-02-19 RX ADMIN — METHYLPREDNISOLONE SODIUM SUCCINATE 40 MG: 40 INJECTION, POWDER, FOR SOLUTION INTRAMUSCULAR; INTRAVENOUS at 11:55

## 2025-02-19 RX ADMIN — SODIUM CHLORIDE 20 ML/HR: 0.9 INJECTION, SOLUTION INTRAVENOUS at 09:35

## 2025-02-19 RX ADMIN — DIPHENHYDRAMINE HYDROCHLORIDE 50 MG: 50 INJECTION, SOLUTION INTRAMUSCULAR; INTRAVENOUS at 10:24

## 2025-02-19 RX ADMIN — FAMOTIDINE 40 MG: 10 INJECTION, SOLUTION INTRAVENOUS at 11:25

## 2025-02-19 NOTE — PROGRESS NOTES
Pt tolerated pre-meds, Entyvio, & hydration 1.5L isolyte well without any complications.  PIV removed without incident.      Celine Roa is aware of future appt on 2/20/25 at 11:30AM at [facility].      AVS declined by Celine Roa.     Pt discharged off unit in stable condition with all personal belongings.

## 2025-02-19 NOTE — PLAN OF CARE
Problem: Potential for Falls  Goal: Patient will remain free of falls  Description: INTERVENTIONS:  - Educate patient/family on patient safety including physical limitations  - Instruct patient to call for assistance with activity   - Consult OT/PT to assist with strengthening/mobility   - Keep Call bell within reach  - Keep bed low and locked with side rails adjusted as appropriate  - Keep care items and personal belongings within reach  - Initiate and maintain comfort rounds  - Make Fall Risk Sign visible to staff  - Consider moving patient to room near nurses station  Outcome: Progressing     Problem: INFECTION - ADULT  Goal: Absence or prevention of progression during hospitalization  Description: INTERVENTIONS:  - Assess and monitor for signs and symptoms of infection  - Monitor lab/diagnostic results  - Monitor all insertion sites, i.e. indwelling lines, tubes, and drains  - Monitor endotracheal if appropriate and nasal secretions for changes in amount and color  - [place] appropriate cooling/warming therapies per order  - Administer medications as ordered  - Instruct and encourage patient and family to use good hand hygiene technique  - Identify and instruct in appropriate isolation precautions for identified infection/condition  Outcome: Progressing     Problem: Knowledge Deficit  Goal: Patient/family/caregiver demonstrates understanding of disease process, treatment plan, medications, and discharge instructions  Description: Complete learning assessment and assess knowledge base.  Interventions:  - Provide teaching at level of understanding  - Provide teaching via preferred learning methods  Outcome: Progressing

## 2025-02-20 ENCOUNTER — HOSPITAL ENCOUNTER (OUTPATIENT)
Dept: INFUSION CENTER | Facility: HOSPITAL | Age: 50
End: 2025-02-20
Payer: COMMERCIAL

## 2025-02-20 VITALS
OXYGEN SATURATION: 95 % | TEMPERATURE: 97.2 F | HEART RATE: 99 BPM | DIASTOLIC BLOOD PRESSURE: 67 MMHG | RESPIRATION RATE: 18 BRPM | SYSTOLIC BLOOD PRESSURE: 113 MMHG

## 2025-02-20 DIAGNOSIS — E61.1 IRON DEFICIENCY: ICD-10-CM

## 2025-02-20 DIAGNOSIS — R19.7 VOMITING AND DIARRHEA: ICD-10-CM

## 2025-02-20 DIAGNOSIS — D50.0 IRON DEFICIENCY ANEMIA DUE TO CHRONIC BLOOD LOSS: Primary | ICD-10-CM

## 2025-02-20 DIAGNOSIS — R11.10 VOMITING AND DIARRHEA: ICD-10-CM

## 2025-02-20 DIAGNOSIS — E44.1 MILD PROTEIN-CALORIE MALNUTRITION (HCC): ICD-10-CM

## 2025-02-20 DIAGNOSIS — R63.0 ANOREXIA: ICD-10-CM

## 2025-02-20 PROCEDURE — 96367 TX/PROPH/DG ADDL SEQ IV INF: CPT

## 2025-02-20 PROCEDURE — 96375 TX/PRO/DX INJ NEW DRUG ADDON: CPT

## 2025-02-20 PROCEDURE — 96365 THER/PROPH/DIAG IV INF INIT: CPT

## 2025-02-20 PROCEDURE — 96366 THER/PROPH/DIAG IV INF ADDON: CPT

## 2025-02-20 RX ORDER — METHYLPREDNISOLONE SODIUM SUCCINATE 40 MG/ML
40 INJECTION, POWDER, LYOPHILIZED, FOR SOLUTION INTRAMUSCULAR; INTRAVENOUS ONCE
Status: CANCELLED
Start: 2025-02-21 | End: 2025-02-21

## 2025-02-20 RX ORDER — SODIUM CHLORIDE, SODIUM GLUCONATE, SODIUM ACETATE, POTASSIUM CHLORIDE, MAGNESIUM CHLORIDE, SODIUM PHOSPHATE, DIBASIC, AND POTASSIUM PHOSPHATE .53; .5; .37; .037; .03; .012; .00082 G/100ML; G/100ML; G/100ML; G/100ML; G/100ML; G/100ML; G/100ML
1500 INJECTION, SOLUTION INTRAVENOUS ONCE
Status: COMPLETED | OUTPATIENT
Start: 2025-02-20 | End: 2025-02-20

## 2025-02-20 RX ORDER — METHYLPREDNISOLONE SODIUM SUCCINATE 40 MG/ML
40 INJECTION, POWDER, LYOPHILIZED, FOR SOLUTION INTRAMUSCULAR; INTRAVENOUS ONCE
Status: COMPLETED | OUTPATIENT
Start: 2025-02-20 | End: 2025-02-20

## 2025-02-20 RX ORDER — SODIUM CHLORIDE, SODIUM GLUCONATE, SODIUM ACETATE, POTASSIUM CHLORIDE, MAGNESIUM CHLORIDE, SODIUM PHOSPHATE, DIBASIC, AND POTASSIUM PHOSPHATE .53; .5; .37; .037; .03; .012; .00082 G/100ML; G/100ML; G/100ML; G/100ML; G/100ML; G/100ML; G/100ML
1500 INJECTION, SOLUTION INTRAVENOUS ONCE
Status: CANCELLED
Start: 2025-02-21 | End: 2025-02-21

## 2025-02-20 RX ADMIN — FAMOTIDINE 40 MG: 10 INJECTION, SOLUTION INTRAVENOUS at 12:33

## 2025-02-20 RX ADMIN — METHYLPREDNISOLONE SODIUM SUCCINATE 40 MG: 40 INJECTION, POWDER, FOR SOLUTION INTRAMUSCULAR; INTRAVENOUS at 13:08

## 2025-02-20 RX ADMIN — SODIUM CHLORIDE, SODIUM GLUCONATE, SODIUM ACETATE, POTASSIUM CHLORIDE, MAGNESIUM CHLORIDE, SODIUM PHOSPHATE, DIBASIC, AND POTASSIUM PHOSPHATE 1500 ML: .53; .5; .37; .037; .03; .012; .00082 INJECTION, SOLUTION INTRAVENOUS at 13:20

## 2025-02-20 RX ADMIN — DIPHENHYDRAMINE HYDROCHLORIDE 50 MG: 50 INJECTION INTRAMUSCULAR; INTRAVENOUS at 12:08

## 2025-02-20 NOTE — PROGRESS NOTES
Celine Roa  tolerated hydration treatment well with no complications.      Celine Roa is aware of future appt on 02/21/25 at 0800.     AVS  No (Declined by Celine Roa) pt has mychart     Pt ambulated with out any complications and took all belongings upon discharge.

## 2025-02-21 ENCOUNTER — HOSPITAL ENCOUNTER (OUTPATIENT)
Dept: INFUSION CENTER | Facility: HOSPITAL | Age: 50
End: 2025-02-21
Payer: COMMERCIAL

## 2025-02-21 VITALS
HEART RATE: 91 BPM | SYSTOLIC BLOOD PRESSURE: 130 MMHG | RESPIRATION RATE: 16 BRPM | DIASTOLIC BLOOD PRESSURE: 81 MMHG | TEMPERATURE: 96.9 F

## 2025-02-21 DIAGNOSIS — E61.1 IRON DEFICIENCY: ICD-10-CM

## 2025-02-21 DIAGNOSIS — E44.1 MILD PROTEIN-CALORIE MALNUTRITION (HCC): ICD-10-CM

## 2025-02-21 DIAGNOSIS — R19.7 VOMITING AND DIARRHEA: ICD-10-CM

## 2025-02-21 DIAGNOSIS — D50.0 IRON DEFICIENCY ANEMIA DUE TO CHRONIC BLOOD LOSS: Primary | ICD-10-CM

## 2025-02-21 DIAGNOSIS — R63.0 ANOREXIA: ICD-10-CM

## 2025-02-21 DIAGNOSIS — R11.10 VOMITING AND DIARRHEA: ICD-10-CM

## 2025-02-21 PROCEDURE — 96367 TX/PROPH/DG ADDL SEQ IV INF: CPT

## 2025-02-21 PROCEDURE — 96365 THER/PROPH/DIAG IV INF INIT: CPT

## 2025-02-21 PROCEDURE — 96375 TX/PRO/DX INJ NEW DRUG ADDON: CPT

## 2025-02-21 PROCEDURE — 96366 THER/PROPH/DIAG IV INF ADDON: CPT

## 2025-02-21 RX ORDER — SODIUM CHLORIDE, SODIUM GLUCONATE, SODIUM ACETATE, POTASSIUM CHLORIDE, MAGNESIUM CHLORIDE, SODIUM PHOSPHATE, DIBASIC, AND POTASSIUM PHOSPHATE .53; .5; .37; .037; .03; .012; .00082 G/100ML; G/100ML; G/100ML; G/100ML; G/100ML; G/100ML; G/100ML
1500 INJECTION, SOLUTION INTRAVENOUS ONCE
Status: COMPLETED | OUTPATIENT
Start: 2025-02-21 | End: 2025-02-21

## 2025-02-21 RX ORDER — METHYLPREDNISOLONE SODIUM SUCCINATE 40 MG/ML
40 INJECTION, POWDER, LYOPHILIZED, FOR SOLUTION INTRAMUSCULAR; INTRAVENOUS ONCE
Start: 2025-02-22 | End: 2025-02-22

## 2025-02-21 RX ORDER — METHYLPREDNISOLONE SODIUM SUCCINATE 40 MG/ML
40 INJECTION, POWDER, LYOPHILIZED, FOR SOLUTION INTRAMUSCULAR; INTRAVENOUS ONCE
Status: COMPLETED | OUTPATIENT
Start: 2025-02-21 | End: 2025-02-21

## 2025-02-21 RX ORDER — SODIUM CHLORIDE, SODIUM GLUCONATE, SODIUM ACETATE, POTASSIUM CHLORIDE, MAGNESIUM CHLORIDE, SODIUM PHOSPHATE, DIBASIC, AND POTASSIUM PHOSPHATE .53; .5; .37; .037; .03; .012; .00082 G/100ML; G/100ML; G/100ML; G/100ML; G/100ML; G/100ML; G/100ML
1500 INJECTION, SOLUTION INTRAVENOUS ONCE
Start: 2025-02-22 | End: 2025-02-22

## 2025-02-21 RX ADMIN — DIPHENHYDRAMINE HYDROCHLORIDE 50 MG: 50 INJECTION, SOLUTION INTRAMUSCULAR; INTRAVENOUS at 09:37

## 2025-02-21 RX ADMIN — METHYLPREDNISOLONE SODIUM SUCCINATE 40 MG: 40 INJECTION, POWDER, FOR SOLUTION INTRAMUSCULAR; INTRAVENOUS at 10:37

## 2025-02-21 RX ADMIN — FAMOTIDINE 40 MG: 10 INJECTION, SOLUTION INTRAVENOUS at 10:06

## 2025-02-21 RX ADMIN — SODIUM CHLORIDE, SODIUM GLUCONATE, SODIUM ACETATE, POTASSIUM CHLORIDE, MAGNESIUM CHLORIDE, SODIUM PHOSPHATE, DIBASIC, AND POTASSIUM PHOSPHATE 1500 ML: .53; .5; .37; .037; .03; .012; .00082 INJECTION, SOLUTION INTRAVENOUS at 10:42

## 2025-02-21 NOTE — PROGRESS NOTES
Name: Celine Roa      : 1975      MRN: [id number]  Encounter Provider: Olaf Rhodes MD  Encounter Date: 2025   Encounter department: [facility]  :  Assessment & Plan  Acute bilateral low back pain without sciatica         Adnexal cyst    Orders:    US pelvis transabdominal only; Future    Pneumobilia    Orders:    Ambulatory Referral to Gastroenterology; Future    Encounter for screening mammogram for malignant neoplasm of breast    Orders:    Mammo screening bilateral w 3d and cad; Future           History of Present Illness   Follow-up visit for back pain, precipitated by patient twice sliding on stool with out falling to ground. Stool was on wheels and slipped out from patient. She has neurosurgery follow up and had studies completed. She has been on valium, initially from ER. Then she was placed on robaxin , reportedly by neurosurgery. Imaging studies revealed osteopenia,(also in ). CT spine, abdomen, pelvis done 25 revealed:    Since 2019 there is a new large right paracentral posterior disc extrusion at L5-S1 extending inferiorly from the disc space that appears to contact the traversing right S1 nerve root. Further evaluation with MRI would be useful.     2 mm nonobstructing calculus in the upper pole collecting system of the right kidney.     2.0 cm left ovarian cyst. Further evaluation with ultrasound is recommended.     Since  there is a new mild superior endplate compression deformity of the anterior L3 vertebral body that is age-indeterminate but probably chronic.        Bilateral nonobstructing renal calculi measuring up to 3 mm. No hydronephrosis of either kidney.     Status post enterotomy. Pneumobilia, increased in prior exam, however common bile duct measures up to 1.4 cm, somewhat increased from prior exam. Correlation with LFTs is recommended and MRCP may also be considered if there is concern for biliary   obstruction.    "  Left adnexal 2.1 cm cyst.     Patient has  LBP without saddle anesthesia or loss of bladder/bowel control. No loss of sensation or strength. Pain mostly in low back and slightly L side predominance. ,         Review of Systems   Constitutional:  Negative for appetite change, diaphoresis and fever.   Respiratory:  Negative for shortness of breath.    Cardiovascular:  Negative for chest pain and leg swelling.   Gastrointestinal:  Negative for abdominal pain, blood in stool, nausea and vomiting.   Genitourinary:  Negative for difficulty urinating and hematuria.   Musculoskeletal:  Positive for back pain. Negative for gait problem, joint swelling and neck pain.   Neurological:  Negative for weakness and numbness.       Objective   /76 (BP Location: Left arm, Patient Position: Sitting, Cuff Size: Standard)   Pulse (!) 110   Temp 98.3 °F (36.8 °C) (Tympanic)   Resp 18   Ht 5' 2\" (1.575 m)   Wt 72.6 kg (160 lb)   SpO2 96%   BMI 29.26 kg/m²      Physical Exam  Vitals reviewed.   Constitutional:       Appearance: Normal appearance.   HENT:      Head: Normocephalic and atraumatic.   Musculoskeletal:      Cervical back: Normal range of motion.      Right lower leg: No edema.      Left lower leg: No edema.      Comments: Pain with twisting but not lateral bend, flexion or extension; negative SLR though some discomfort on contralateral side (R leg with mild L side pain ); Pain L SI joint and paraspinal L/S region    DTR's symmetric LE's and 2+; Sensation L4, L5, S! Intact B/L    Gait steady    Strength intact LE's   Skin:     General: Skin is warm and dry.   Neurological:      General: No focal deficit present.      Mental Status: She is alert and oriented to person, place, and time.      Sensory: No sensory deficit.      Motor: No weakness.      Gait: Gait normal.   Psychiatric:         Mood and Affect: Mood normal.         Behavior: Behavior normal.         "

## 2025-02-21 NOTE — PLAN OF CARE
Problem: Potential for Falls  Goal: Patient will remain free of falls  Description: INTERVENTIONS:  - Educate patient/family on patient safety including physical limitations  - Instruct patient to call for assistance with activity   - Consult OT/PT to assist with strengthening/mobility   - Keep Call bell within reach  - Keep bed low and locked with side rails adjusted as appropriate  - Keep care items and personal belongings within reach  - Initiate and maintain comfort rounds  - Make Fall Risk Sign visible to staff  - Consider moving patient to room near nurses station  Outcome: Progressing     Problem: INFECTION - ADULT  Goal: Absence or prevention of progression during hospitalization  Description: INTERVENTIONS:  - Assess and monitor for signs and symptoms of infection  - Monitor lab/diagnostic results  - Monitor all insertion sites, i.e. indwelling lines, tubes, and drains  - Monitor endotracheal if appropriate and nasal secretions for changes in amount and color  - [place] appropriate cooling/warming therapies per order  - Administer medications as ordered  - Instruct and encourage patient and family to use good hand hygiene technique  - Identify and instruct in appropriate isolation precautions for identified infection/condition  Outcome: Progressing     Problem: Knowledge Deficit  Goal: Patient/family/caregiver demonstrates understanding of disease process, treatment plan, medications, and discharge instructions  Description: Complete learning assessment and assess knowledge base.  Interventions:  - Provide teaching at level of understanding  - Provide teaching via preferred learning methods  Outcome: Progressing

## 2025-02-21 NOTE — PROGRESS NOTES
Patient tolerated IV hydration without issues.     Celine Roa is aware of future appt on 2/27/24 at 0830.     AVS-  No (Declined by Celine Roa) Patient has Mychart.    Patient ambulated off unit without incident.  All personal belongings taken with patient.

## 2025-02-24 ENCOUNTER — RESULTS FOLLOW-UP (OUTPATIENT)
Age: 50
End: 2025-02-24

## 2025-02-25 ENCOUNTER — HOSPITAL ENCOUNTER (OUTPATIENT)
Dept: INFUSION CENTER | Facility: HOSPITAL | Age: 50
End: 2025-02-25

## 2025-02-27 ENCOUNTER — HOSPITAL ENCOUNTER (OUTPATIENT)
Dept: INFUSION CENTER | Facility: HOSPITAL | Age: 50
End: 2025-02-27

## 2025-03-04 ENCOUNTER — OFFICE VISIT (OUTPATIENT)
Age: 50
End: 2025-03-04
Payer: COMMERCIAL

## 2025-03-04 VITALS
DIASTOLIC BLOOD PRESSURE: 80 MMHG | HEIGHT: 65 IN | HEART RATE: 88 BPM | OXYGEN SATURATION: 99 % | SYSTOLIC BLOOD PRESSURE: 130 MMHG | BODY MASS INDEX: 27.49 KG/M2 | TEMPERATURE: 98.4 F | WEIGHT: 165 LBS | RESPIRATION RATE: 20 BRPM

## 2025-03-04 DIAGNOSIS — M51.16 LUMBAR DISC HERNIATION WITH RADICULOPATHY: Primary | ICD-10-CM

## 2025-03-04 PROCEDURE — 99215 OFFICE O/P EST HI 40 MIN: CPT | Performed by: PHYSICIAN ASSISTANT

## 2025-03-04 NOTE — PROGRESS NOTES
Name: Celine Roa      : 1975      MRN: [id number]  Encounter Provider: Christine Saavedra PA-C  Encounter Date: 3/4/2025   [facility]  :  Assessment & Plan  Lumbar disc herniation with radiculopathy  Patient presents today for follow-up of back pain and lower extremity symptoms.  She sustained a slip and fall off a desk chair developing progressive back pain at the end of 2025.  Developed radicular tingling down the medial aspect of her left leg and groin as well as a sensation of a tourniquet and paresthesias of her right lower leg.  Developed urinary frequency and urgency originally with some improvement. One episode of urinary incontinence overnight this past week.  Limited urge to void despite substantial fluid intake.   Known history of left foot drop 2/2 prior fracture and surgery. Trips on steps and changes in ilene.   Now with progressive right lateral leg and foot numbness leading to one fall.     Imaging:   MRI lumbar spine without completed in : Large prominent right subarticular disc extrusion L5-S1 with compression of the right S1 descending nerve root and central canal stenosis.  Chronic L3 superior endplate compression fracture with mild height loss.     Plan:   Continue to monitor pain and neurological symptoms  MRI imaging reviewed in detail with patient demonstrating a sizable right-sided L5-S1 disc herniation with advanced degenerative changes at L5-S1.  Given right subarticular L5/S1 disc herniation in the setting of progressive S1 dermatomal paresthesias and numbness, discussed considerations for surgical intervention including an L5/S1 lumbar decompression.  Surgical procedure reviewed with patient.  Reviewed risks and benefits associated with surgery including but not limited to cardiac and pulmonary complications, infection, temporary or permanent nerve damage, CSF leak, infection, etc.  Discussed with patient  goal of surgery to remove the pressure off of right S1 nerve and hopeful improvement of her symptoms and prevention of progressive deficits.  Discussed possible residual paresthesias/numbness.  Discussed options for conservative management including medications, physical therapy and interventional pain management with injections.  Discussed possibility of prolonged nerve compression leading to intrinsic damage of nerve and irreversible symptoms.  Patient should continue to limit activities as tolerated and avoid bending, twisting and heavy lifting.  Patient discussed concerns about proceeding with surgical intervention including prior complications of infections with other surgical procedures.  She states this is related to her underlying mast cell disease.  Discussed possible consideration for monitoring overnight for 3 doses of IV antibiotics as patient's oral antibiotics appear limited secondary to her corn allergy.  If she is aware of oral antibiotics that she is able to take, would consider prophylactic oral antibiotic postop.  Recommend patient follow-up with spine neurosurgeon to further discuss ongoing management of symptoms.  If patient decides to proceed with surgery, she would likely prefer to have it completed prior to April 1 as her GI physician is leaving the practice after that time.  Offered patient to be seen by Surgeon today but prefers her  be present.   She is aware to call the office with any questions or concerns in the interim.             History of Present Illness       This is a 49-year-old female with past medical history significant for thrombocytosis, seizures, rheumatoid arthritis CKD, Crohn's disease, mass cell disease, ulcerative colitis, history of DVT, thyroid disease, and white matter disease (prior dx MS) who presents today for evaluation of back pain and lower extremity symptoms.  Patient states approximately end of Jan she was shredding paper sitting on a computer chair.   When the shredder jammed, she bent over to fix it causing the chair to roll out from under her.  She fell with her right thigh landing on the shredding box.  She noted soreness in her back for several days.  Similar episode happened again approximately 4 days later where she tried to catch herself from laying out of the chair.  She developed progressive back pain described as a soreness.  That day she had also received Xolair injections in her back which typically cause soreness.  She felt her discomfort may be related to her medication injections.  Two days later the patient was in the infusion center and needed to get up frequently to void.  She developed progressive pain with the continuous change in position to the point where she was unable to get up.  She was taken to the emergency room where she was originally evaluated for kidney stones.  CT recon lumbar spine demonstrated a large right paracentral L5/S1 disc herniation.  For these reasons, she was referred to [facility] neurosurgery for ongoing evaluation.    Today, patient states her back pain is limited.  Her baseline prednisone taper for ulcerative colitis was increased and is assisting with her radicular leg pain as well as back pain.  She denies any left leg symptoms at this time.  Patient notes over the last week, there is a sharp pain behind her right knee with radiating pain to the posterior lateral leg.  She notes significant numbness of her right lateral foot.  This has led to a fall with significant bruising of her elbows and knees.  She describes 1 episode of incontinence overnight.  She states limited urge to void despite substantial fluid intake.  Denies any saddle numbness. Denies any improvement of her pain with the use of Robaxin and noted side effects.    Of note, patient works at [employer] and has been out of work since evaluation in the emergency department as advised given physical and active nature of her job.          Review of  Systems   Constitutional: Negative.    HENT: Negative.     Eyes: Negative.    Respiratory: Negative.     Cardiovascular: Negative.    Gastrointestinal: Negative.    Endocrine: Negative.    Genitourinary: Negative.    Musculoskeletal:  Positive for back pain and gait problem (left foot drop).        When lifting things the back hurt    Allergic/Immunologic: Negative.    Neurological:  Positive for weakness (when getting from sitting or laying position) and numbness (right foot and lower leg).        No feeling from the knee to the toes on the right leg   Psychiatric/Behavioral:  Negative for sleep disturbance (due to pain).      I have personally reviewed the MA's review of systems and made changes as necessary.    Past Medical History   Past Medical History:   Diagnosis Date    Anemia 2007    Anxiety     Asthma     Bile duct stricture 2014    Sphincter of oddi dysfunction    Chronic kidney disease     Closed bimalleolar fracture 02/22/2024    Colon polyp 1998    Crohn's colitis (HCC)     Cyst of ovary 02/22/2024    Deep vein thrombosis (HCC)     Disease of thyroid gland     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 pt denies, documented in cardio note    Pain of right thigh 02/13/2023    Pancreatitis     sphinger of     Psychiatric disorder     RA (rheumatoid arthritis) (HCC)     Seizures (HCC)     Sepsis without acute organ dysfunction (HCC) 2/15/2024    SIRS (systemic inflammatory response syndrome) (HCC) 8/25/2017    Thrombocytosis 04/05/2022    Ulcerative colitis (HCC)     Visual impairment      Past Surgical History:   Procedure Laterality Date    ABDOMINAL SURGERY  2017    APPENDECTOMY       CHOLECYSTECTOMY      COLONOSCOPY  2019    CYSTOSCOPY N/A 06/08/2023    Procedure: CYSTOSCOPY, mini TURBT with fulgeration;  Surgeon: Tee Bruno MD;  Location: [facility];  Service: Urology    EGD AND COLONOSCOPY N/A 09/12/2017    Procedure: EGD AND COLONOSCOPY;  Surgeon: Tommy Oliver MD;  Location:  GI LAB;  Service: Gastroenterology    ERCP N/A 09/15/2017    Procedure: ENDOSCOPIC RETROGRADE CHOLANGIOPANCREATOGRAPHY (ERCP);  Surgeon: Dre Soto MD;  Location: [facility];  Service: Gastroenterology    ERCP  02/14/2024    HYSTERECTOMY      KNEE SURGERY Right     LAPAROSCOPIC ENDOMETRIOSIS FULGURATION      ORIF ANKLE FRACTURE Left     AR ARTHRS KNE SURG W/MENISCECTOMY MED/LAT W/SHVG Left 05/12/2017    Procedure: POSSIBLE MEDIAL MENISECTOMY;  Surgeon: Kristian Avila MD;  Location: [facility];  Service: Orthopedics    AR ARTHRS KNEE DEBRIDEMENT/SHAVING ARTCLR CRTLG Left 05/12/2017    Procedure: KNEE ARTHROSCOPY EVALUATION, CHONDROPLASTY;  Surgeon: Kristian Avila MD;  Location: [facility];  Service: Orthopedics    AR LAPS ABD PRTM&OMENTUM DX W/WO SPEC BR/WA SPX N/A 12/12/2017    Procedure: LAPAROSCOPY DIAGNOSTIC  WITH LYSIS OF ADHESIONS;  Surgeon: Olaf John DO;  Location:  MAIN OR;  Service: General    UPPER GASTROINTESTINAL ENDOSCOPY  2019     Family History   Problem Relation Age of Onset    Ulcerative colitis Mother     Arthritis Mother     Colon polyps Mother     Heart failure Father     Neuropathy Father     Macular degeneration Father     Diabetes Father     Early death Father     Vision loss Father     Asthma Daughter     Hypothyroidism Daughter     Heart disease Maternal Grandmother     Arthritis Maternal Grandmother     No Known Problems Maternal Grandfather     Arthritis Paternal Grandmother     Macular degeneration Paternal Grandmother     Vision loss Paternal Grandmother     Lymphoma Paternal Grandfather     Cancer Paternal Grandfather     Early death Paternal Grandfather     Breast cancer Maternal  Aunt     Cancer Maternal Aunt     Miscarriages / Stillbirths Maternal Aunt     Heart failure Paternal Aunt     Heart failure Paternal Aunt     Irritable bowel syndrome Paternal Aunt     Breast cancer Paternal Aunt 54    Breast cancer additional onset Paternal Aunt 58    Hypothyroidism Paternal Aunt     Cancer Paternal Aunt     No Known Problems Son     No Known Problems Son     Kidney disease Brother     Depression Paternal Uncle     Early death Paternal Uncle     Depression Paternal Uncle     Early death Paternal Uncle      she reports that she has never smoked. She has never used smokeless tobacco. She reports that she does not drink alcohol and does not use drugs.  Current Outpatient Medications   Medication Instructions    B Complex Vitamins (VITAMIN B COMPLEX PO) 100 mg, Daily    cetirizine (ZYRTEC) 20 mg, 2 times daily    cholecalciferol (VITAMIN D3) 5,000 Units, Daily    cyanocobalamin (VITAMIN B-12) 100 mcg tablet Daily    diazepam (VALIUM) 5 mg, Oral, Every 8 hours PRN    diphenhydrAMINE (BENADRYL) 50 mg, 4 times daily PRN    diphenhydrAMINE (BENADRYL) 50 mg, Intravenous, Every 6 hours PRN    EPINEPHrine (EPIPEN) 0.3 mg, Intramuscular, Daily PRN, No substitutions with generic    famotidine (PEPCID) 40 mg, Oral, Daily    furosemide (LASIX) 20 mg, As needed    hydrOXYzine HCL (ATARAX) 25 mg, Oral, Daily PRN    hyoscyamine (ANASPAZ,LEVSIN) 0.125 mg, Every 6 hours PRN    Lactobacillus (PROBIOTIC ACIDOPHILUS PO) Take by mouth    Lactobacillus-Inulin ([facility]) CAPS 200 mg, Daily    levalbuterol (XOPENEX HFA) 45 mcg/act inhaler 1-2 puffs, Inhalation, Every 4 hours PRN    levothyroxine 75 mcg, Daily    liothyronine (CYTOMEL) 5 mcg, Oral, Daily    magnesium gluconate (MAGONATE) 500 mg, 2 times daily    methocarbamol (ROBAXIN-750) 750 mg, Oral, Every 8 hours PRN    mometasone (ELOCON) 0.1 % cream Topical, Daily    montelukast (SINGULAIR) 10 mg, Oral, Daily at bedtime    PEPPERMINT OIL PO 1  capsule, 2 times daily    predniSONE 20 mg tablet Take 40mg per day for 1 week, then 30mg per day for 1 week, then 20mg per day for 1 week and then 10mg per day for 1 week.    Probiotic Product (CULTURELLE ABDOMINAL SUPPORT PO) 200 mg, Daily    Rinvoq 45 mg, Oral, Daily, Induction dosing    Vitamin B-2 200 mg, 2 times daily    vitamin C 500 mg, 2 times daily     Allergies   Allergen Reactions    Aimovig [Erenumab-Aooe] Anaphylaxis    Amitriptyline Anaphylaxis     According to the patient she had nphylaxis    Aspergillus Allergy Skin Test Other (See Comments), Hives and Swelling    Azathioprine Other (See Comments) and Anaphylaxis     pancreatitis  According to patient she needed Epipen    Buspar [Buspirone] Hives and Shortness Of Breath    Citric Acid-Polysorbate 80 Abdominal Pain, Anaphylaxis, Anxiety, Bradycardia, Diarrhea, Fatigue, GI Intolerance, Headache, Hives, Hyperactivity, Itching, Lip Swelling, Nausea Only, Palpitations, Rash, Shortness Of Breath, Tachycardia, Throat Swelling, Tongue Swelling and Vomiting    Corn Dextrin Anaphylaxis     Any corn based products    Erenumab Anaphylaxis     patient sent portal message stating she had anaphylaxis  patient sent portal message stating she had anaphylaxis      Gadolinium Abdominal Pain, Anaphylaxis, Anxiety, Confusion, Delirium, Dizziness, Eye Swelling, Fatigue, GI Intolerance, Headache, Hives, Irritability, Itching, Lip Swelling, Nausea Only, Palpitations, Photosensitivity, Rash, Seizures, Shortness Of Breath, Swelling, Syncope, Throat Swelling, Tongue Swelling, Tremor, Visual Disturbance and Vomiting    Gelatin - Food Allergy Anaphylaxis    Heparin Hives     Painful welts     Lavender Oil Anaphylaxis     Other reaction(s): anaphalxis    Malto Dextrin [Dextrin] Anaphylaxis    Penicillium Notatum (Diagnost) Shortness Of Breath and Swelling    Red Dye - Food Allergy Anaphylaxis and Other (See Comments)     intolerance    Sumatriptan Anaphylaxis     Bradycardia,  "sob, back pressure, head pain,throat tightness  According to the patient she had anphylaxis    Ustekinumab (Murine) Anaphylaxis    Lidocaine Hives, Itching and Rash    Contrast [Iodinated Contrast Media] Other (See Comments)     Pt states needs to be premedicated prior to injection    Corn Oil - Food Allergy - Food Allergy Hives    Humira [Adalimumab] Other (See Comments)     \"I develop drug induced lupus\"    Ibuprofen Hives and Throat Swelling    Polyethylene Glycol Diarrhea and Vomiting    Remicade [Infliximab] Other (See Comments)     \"I develop drug induced lupus\"    Sulfa Antibiotics Hives and Other (See Comments)    Sulfate Hives    Sulfites - Food Allergy Hives    Sweet Corn Extract Skin Test - Food Allergy Hives and Itching    Chlorhexidine Itching    Freederm Adhesive Remover [New Skin] Rash    Histamine Hives, Rash and Other (See Comments)     Intolerance      Wound Dressings Rash      Objective   /80 (BP Location: Right arm, Patient Position: Sitting, Cuff Size: Standard)   Pulse 88   Temp 98.4 °F (36.9 °C) (Temporal)   Resp 20   Ht 5' 5\" (1.651 m)   Wt 74.8 kg (165 lb)   SpO2 99%   BMI 27.46 kg/m²     Physical Exam  Constitutional:       General: She is not in acute distress.     Appearance: Normal appearance. She is well-developed. She is not ill-appearing.   HENT:      Head: Normocephalic and atraumatic.      Right Ear: External ear normal.      Left Ear: External ear normal.      Nose: Nose normal.      Mouth/Throat:      Mouth: Mucous membranes are moist.   Eyes:      General: No scleral icterus.        Right eye: No discharge.         Left eye: No discharge.      Conjunctiva/sclera: Conjunctivae normal.   Cardiovascular:      Rate and Rhythm: Normal rate.   Pulmonary:      Effort: Pulmonary effort is normal. No respiratory distress.   Abdominal:      General: There is no distension.   Musculoskeletal:         General: No tenderness.   Skin:     General: Skin is warm and dry.      " Findings: No rash.   Neurological:      Mental Status: She is alert.   Psychiatric:         Mood and Affect: Mood normal.         Speech: Speech normal.         Behavior: Behavior normal.         Thought Content: Thought content normal.         Judgment: Judgment normal.       Neurological Exam  Mental Status  Alert. Speech is normal. Language is fluent with no aphasia. Attention and concentration are normal.    Cranial Nerves  CN VII: Full and symmetric facial movement.    Motor  Normal muscle bulk throughout. Normal muscle tone. Strength is 5/5 in all four extremities except as noted.  Mild left ankle weakness.    Sensory  Light touch abnormality:   Decreased LT right lateral foot and lower leg  Dullness to PP right lateral foot and distal and posterior lower leg.    Reflexes    Right pathological reflexes: Kristen's absent. Ankle clonus absent.  Left pathological reflexes: Kristen's absent. Ankle clonus absent.    Gait  Casual gait is normal including stance, stride, and arm swing. Able to complete.      Radiology Results Review: I personally reviewed the following image studies in PACS and associated radiology reports: MRI spine. My interpretation of the radiology images/reports is: as above.    Administrative Statements   I have spent a total time of 70 minutes in caring for this patient on the day of the visit/encounter including Diagnostic results, Prognosis, Risks and benefits of tx options, Instructions for management, Patient and family education, Impressions, Documenting in the medical record, Reviewing/placing orders in the medical record (including tests, medications, and/or procedures), Obtaining or reviewing history  , and Communicating with other healthcare professionals .

## 2025-03-05 ENCOUNTER — TELEPHONE (OUTPATIENT)
Dept: NEUROSURGERY | Facility: CLINIC | Age: 50
End: 2025-03-05

## 2025-03-05 ENCOUNTER — TELEPHONE (OUTPATIENT)
Age: 50
End: 2025-03-05

## 2025-03-05 ENCOUNTER — HOSPITAL ENCOUNTER (OUTPATIENT)
Dept: INFUSION CENTER | Facility: HOSPITAL | Age: 50
Discharge: HOME | End: 2025-03-05
Payer: COMMERCIAL

## 2025-03-05 DIAGNOSIS — R63.0 ANOREXIA: ICD-10-CM

## 2025-03-05 DIAGNOSIS — E44.1 MILD PROTEIN-CALORIE MALNUTRITION (HCC): ICD-10-CM

## 2025-03-05 DIAGNOSIS — E61.1 IRON DEFICIENCY: ICD-10-CM

## 2025-03-05 DIAGNOSIS — D50.0 IRON DEFICIENCY ANEMIA DUE TO CHRONIC BLOOD LOSS: Primary | ICD-10-CM

## 2025-03-05 DIAGNOSIS — R11.10 VOMITING AND DIARRHEA: ICD-10-CM

## 2025-03-05 DIAGNOSIS — R19.7 VOMITING AND DIARRHEA: ICD-10-CM

## 2025-03-05 PROCEDURE — 96375 TX/PRO/DX INJ NEW DRUG ADDON: CPT

## 2025-03-05 PROCEDURE — 96365 THER/PROPH/DIAG IV INF INIT: CPT

## 2025-03-05 PROCEDURE — 96367 TX/PROPH/DG ADDL SEQ IV INF: CPT

## 2025-03-05 PROCEDURE — 96366 THER/PROPH/DIAG IV INF ADDON: CPT

## 2025-03-05 RX ORDER — METHYLPREDNISOLONE SODIUM SUCCINATE 40 MG/ML
40 INJECTION, POWDER, LYOPHILIZED, FOR SOLUTION INTRAMUSCULAR; INTRAVENOUS ONCE
Status: CANCELLED
Start: 2025-03-06 | End: 2025-03-06

## 2025-03-05 RX ORDER — SODIUM CHLORIDE, SODIUM GLUCONATE, SODIUM ACETATE, POTASSIUM CHLORIDE, MAGNESIUM CHLORIDE, SODIUM PHOSPHATE, DIBASIC, AND POTASSIUM PHOSPHATE .53; .5; .37; .037; .03; .012; .00082 G/100ML; G/100ML; G/100ML; G/100ML; G/100ML; G/100ML; G/100ML
1500 INJECTION, SOLUTION INTRAVENOUS ONCE
Status: COMPLETED | OUTPATIENT
Start: 2025-03-05 | End: 2025-03-05

## 2025-03-05 RX ORDER — METHYLPREDNISOLONE SODIUM SUCCINATE 40 MG/ML
40 INJECTION, POWDER, LYOPHILIZED, FOR SOLUTION INTRAMUSCULAR; INTRAVENOUS ONCE
Status: COMPLETED | OUTPATIENT
Start: 2025-03-05 | End: 2025-03-05

## 2025-03-05 RX ORDER — SODIUM CHLORIDE, SODIUM GLUCONATE, SODIUM ACETATE, POTASSIUM CHLORIDE, MAGNESIUM CHLORIDE, SODIUM PHOSPHATE, DIBASIC, AND POTASSIUM PHOSPHATE .53; .5; .37; .037; .03; .012; .00082 G/100ML; G/100ML; G/100ML; G/100ML; G/100ML; G/100ML; G/100ML
1500 INJECTION, SOLUTION INTRAVENOUS ONCE
Status: CANCELLED
Start: 2025-03-06 | End: 2025-03-06

## 2025-03-05 RX ADMIN — SODIUM CHLORIDE, SODIUM GLUCONATE, SODIUM ACETATE, POTASSIUM CHLORIDE, MAGNESIUM CHLORIDE, SODIUM PHOSPHATE, DIBASIC, AND POTASSIUM PHOSPHATE 1500 ML: .53; .5; .37; .037; .03; .012; .00082 INJECTION, SOLUTION INTRAVENOUS at 13:36

## 2025-03-05 RX ADMIN — DIPHENHYDRAMINE HYDROCHLORIDE 50 MG: 50 INJECTION INTRAMUSCULAR; INTRAVENOUS at 12:55

## 2025-03-05 RX ADMIN — FAMOTIDINE 40 MG: 10 INJECTION, SOLUTION INTRAVENOUS at 13:16

## 2025-03-05 RX ADMIN — METHYLPREDNISOLONE SODIUM SUCCINATE 40 MG: 40 INJECTION, POWDER, FOR SOLUTION INTRAMUSCULAR; INTRAVENOUS at 13:35

## 2025-03-05 NOTE — TELEPHONE ENCOUNTER
I called pt to confirm upcoming procedure with Dr. Levine on 03/20/2025, Lvm and requested call back if she have any questions about the prep.

## 2025-03-05 NOTE — TELEPHONE ENCOUNTER
Received a secured chat from Dr. Telles and Elise who are requesting to add this patient on with [facility]DM solo today 3/5 or tomorrow 3/6, 3/10 after case, 3/11 [facility], or 3/12 at [facility].    LV providing direct call back number.

## 2025-03-05 NOTE — TELEPHONE ENCOUNTER
Connected with the patient via phone patient received Entyvio infusion on February 19 and noticed a few days later having a dry feeling in her mouth, her face would become all red and puffy which then spread to her neck and chest and became itchy. Pt would take took a dose of Benadryl or Singulair with improvement.  Patient's has noticed these symptoms have increased in frequency over the last week and has had to use 6 EpiPen's this past week.  Patient states she was diagnosed with a yeast infection yesterday and was prescribed fluconazole.  Patient requesting Dr. Levine to review if patient is to continue with second Entyvio infusion tomorrow 3/6.  Patient to send pictures of rashes via [employer].

## 2025-03-05 NOTE — PROGRESS NOTES
Celine Roa  tolerated IV hydration well with no complications.      Celine Roa is aware of future appt tomorrow at 8:30AM at [facility].     AVS declined by Celine Roa.

## 2025-03-05 NOTE — TELEPHONE ENCOUNTER
Patients GI provider:  Dr. Levine    Number to return call: [phone]    Reason for call: Pt called wit reactions to Entyvio she believes. I transferred the call to Ana Rosa Young.    Scheduled procedure/appointment date if applicable: Apt/procedure 03/06/25

## 2025-03-05 NOTE — ASSESSMENT & PLAN NOTE
Patient presents today for follow-up of back pain and lower extremity symptoms.  She sustained a slip and fall off a desk chair developing progressive back pain at the end of Jan 2025.  Developed radicular tingling down the medial aspect of her left leg and groin as well as a sensation of a tourniquet and paresthesias of her right lower leg.  Developed urinary frequency and urgency originally with some improvement. One episode of urinary incontinence overnight this past week.  Limited urge to void despite substantial fluid intake.   Known history of left foot drop 2/2 prior fracture and surgery. Trips on steps and changes in ilene.   Now with progressive right lateral leg and foot numbness leading to one fall.     Imaging:   MRI lumbar spine without completed in 2024: Large prominent right subarticular disc extrusion L5-S1 with compression of the right S1 descending nerve root and central canal stenosis.  Chronic L3 superior endplate compression fracture with mild height loss.     Plan:   Continue to monitor pain and neurological symptoms  MRI imaging reviewed in detail with patient demonstrating a sizable right-sided L5-S1 disc herniation with advanced degenerative changes at L5-S1.  Given right subarticular L5/S1 disc herniation in the setting of progressive S1 dermatomal paresthesias and numbness, discussed considerations for surgical intervention including an L5/S1 lumbar decompression.  Surgical procedure reviewed with patient.  Reviewed risks and benefits associated with surgery including but not limited to cardiac and pulmonary complications, infection, temporary or permanent nerve damage, CSF leak, infection, etc.  Discussed with patient goal of surgery to remove the pressure off of right S1 nerve and hopeful improvement of her symptoms and prevention of progressive deficits.  Discussed possible residual paresthesias/numbness.  Discussed options for conservative management including medications, physical  therapy and interventional pain management with injections.  Discussed possibility of prolonged nerve compression leading to intrinsic damage of nerve and irreversible symptoms.  Patient should continue to limit activities as tolerated and avoid bending, twisting and heavy lifting.  Patient discussed concerns about proceeding with surgical intervention including prior complications of infections with other surgical procedures.  She states this is related to her underlying mast cell disease.  Discussed possible consideration for monitoring overnight for 3 doses of IV antibiotics as patient's oral antibiotics appear limited secondary to her corn allergy.  If she is aware of oral antibiotics that she is able to take, would consider prophylactic oral antibiotic postop.  Recommend patient follow-up with spine neurosurgeon to further discuss ongoing management of symptoms.  If patient decides to proceed with surgery, she would likely prefer to have it completed prior to April 1 as her GI physician is leaving the practice after that time.  Offered patient to be seen by Surgeon today but prefers her  be present.   She is aware to call the office with any questions or concerns in the interim.

## 2025-03-06 ENCOUNTER — ANESTHESIA EVENT (OUTPATIENT)
Dept: ANESTHESIOLOGY | Facility: HOSPITAL | Age: 50
End: 2025-03-06

## 2025-03-06 ENCOUNTER — HOSPITAL ENCOUNTER (OUTPATIENT)
Dept: INFUSION CENTER | Facility: HOSPITAL | Age: 50
Discharge: HOME | End: 2025-03-06
Payer: COMMERCIAL

## 2025-03-06 ENCOUNTER — ANESTHESIA (OUTPATIENT)
Dept: ANESTHESIOLOGY | Facility: HOSPITAL | Age: 50
End: 2025-03-06

## 2025-03-06 VITALS
SYSTOLIC BLOOD PRESSURE: 132 MMHG | DIASTOLIC BLOOD PRESSURE: 97 MMHG | TEMPERATURE: 98.1 F | RESPIRATION RATE: 18 BRPM | HEART RATE: 102 BPM

## 2025-03-06 DIAGNOSIS — K50.119 CROHN'S DISEASE OF COLON WITH COMPLICATION (HCC): ICD-10-CM

## 2025-03-06 DIAGNOSIS — E61.1 IRON DEFICIENCY: ICD-10-CM

## 2025-03-06 DIAGNOSIS — R19.7 VOMITING AND DIARRHEA: ICD-10-CM

## 2025-03-06 DIAGNOSIS — E44.1 MILD PROTEIN-CALORIE MALNUTRITION (HCC): ICD-10-CM

## 2025-03-06 DIAGNOSIS — R11.10 VOMITING AND DIARRHEA: ICD-10-CM

## 2025-03-06 DIAGNOSIS — L50.1 CHRONIC IDIOPATHIC URTICARIA: ICD-10-CM

## 2025-03-06 DIAGNOSIS — R63.0 ANOREXIA: ICD-10-CM

## 2025-03-06 DIAGNOSIS — D50.0 IRON DEFICIENCY ANEMIA DUE TO CHRONIC BLOOD LOSS: Primary | ICD-10-CM

## 2025-03-06 PROCEDURE — 96372 THER/PROPH/DIAG INJ SC/IM: CPT

## 2025-03-06 PROCEDURE — 96366 THER/PROPH/DIAG IV INF ADDON: CPT

## 2025-03-06 PROCEDURE — 96367 TX/PROPH/DG ADDL SEQ IV INF: CPT

## 2025-03-06 PROCEDURE — 96365 THER/PROPH/DIAG IV INF INIT: CPT

## 2025-03-06 PROCEDURE — 96375 TX/PRO/DX INJ NEW DRUG ADDON: CPT

## 2025-03-06 RX ORDER — EPINEPHRINE 1 MG/ML
0.3 INJECTION, SOLUTION, CONCENTRATE INTRAVENOUS
Status: DISCONTINUED | OUTPATIENT
Start: 2025-03-06 | End: 2025-03-09 | Stop reason: HOSPADM

## 2025-03-06 RX ORDER — METHYLPREDNISOLONE SODIUM SUCCINATE 40 MG/ML
40 INJECTION, POWDER, LYOPHILIZED, FOR SOLUTION INTRAMUSCULAR; INTRAVENOUS ONCE
Status: CANCELLED
Start: 2025-03-07 | End: 2025-03-07

## 2025-03-06 RX ORDER — SODIUM CHLORIDE, SODIUM GLUCONATE, SODIUM ACETATE, POTASSIUM CHLORIDE, MAGNESIUM CHLORIDE, SODIUM PHOSPHATE, DIBASIC, AND POTASSIUM PHOSPHATE .53; .5; .37; .037; .03; .012; .00082 G/100ML; G/100ML; G/100ML; G/100ML; G/100ML; G/100ML; G/100ML
1500 INJECTION, SOLUTION INTRAVENOUS ONCE
Status: CANCELLED
Start: 2025-03-07 | End: 2025-03-07

## 2025-03-06 RX ORDER — EPINEPHRINE 1 MG/ML
0.3 INJECTION, SOLUTION, CONCENTRATE INTRAVENOUS
OUTPATIENT
Start: 2025-04-03

## 2025-03-06 RX ORDER — SODIUM CHLORIDE, SODIUM GLUCONATE, SODIUM ACETATE, POTASSIUM CHLORIDE, MAGNESIUM CHLORIDE, SODIUM PHOSPHATE, DIBASIC, AND POTASSIUM PHOSPHATE .53; .5; .37; .037; .03; .012; .00082 G/100ML; G/100ML; G/100ML; G/100ML; G/100ML; G/100ML; G/100ML
1500 INJECTION, SOLUTION INTRAVENOUS ONCE
Status: COMPLETED | OUTPATIENT
Start: 2025-03-06 | End: 2025-03-06

## 2025-03-06 RX ORDER — METHYLPREDNISOLONE SODIUM SUCCINATE 40 MG/ML
40 INJECTION, POWDER, LYOPHILIZED, FOR SOLUTION INTRAMUSCULAR; INTRAVENOUS ONCE
Status: COMPLETED | OUTPATIENT
Start: 2025-03-06 | End: 2025-03-06

## 2025-03-06 RX ADMIN — SODIUM CHLORIDE, SODIUM GLUCONATE, SODIUM ACETATE, POTASSIUM CHLORIDE, MAGNESIUM CHLORIDE, SODIUM PHOSPHATE, DIBASIC, AND POTASSIUM PHOSPHATE 1500 ML: .53; .5; .37; .037; .03; .012; .00082 INJECTION, SOLUTION INTRAVENOUS at 10:13

## 2025-03-06 RX ADMIN — METHYLPREDNISOLONE SODIUM SUCCINATE 40 MG: 40 INJECTION, POWDER, FOR SOLUTION INTRAMUSCULAR; INTRAVENOUS at 10:09

## 2025-03-06 RX ADMIN — DIPHENHYDRAMINE HYDROCHLORIDE 50 MG: 50 INJECTION, SOLUTION INTRAMUSCULAR; INTRAVENOUS at 08:59

## 2025-03-06 RX ADMIN — OMALIZUMAB 300 MG: 150 INJECTION, SOLUTION SUBCUTANEOUS at 13:16

## 2025-03-06 RX ADMIN — FAMOTIDINE 40 MG: 10 INJECTION, SOLUTION INTRAVENOUS at 09:35

## 2025-03-06 NOTE — PROGRESS NOTES
Pt tolerated pre-meds, IV hydration 1.5L isolyte, & Xolair injections (x1 R abdomen & x1 L abdomen) well without any complications.  PIV removed without incident.      Celine Roa is aware of future appt on 3/7/25 at 11:00AM at [facility].      AVS declined by Celine Roa.     Pt discharged off unit in stable condition with all personal belongings.

## 2025-03-06 NOTE — PLAN OF CARE
Problem: Potential for Falls  Goal: Patient will remain free of falls  Description: INTERVENTIONS:  - Educate patient/family on patient safety including physical limitations  - Instruct patient to call for assistance with activity   - Consult OT/PT to assist with strengthening/mobility   - Keep Call bell within reach  - Keep bed low and locked with side rails adjusted as appropriate  - Keep care items and personal belongings within reach  - Initiate and maintain comfort rounds  - Make Fall Risk Sign visible to staff  - Consider moving patient to room near nurses station  Outcome: Progressing     Problem: INFECTION - ADULT  Goal: Absence or prevention of progression during hospitalization  Description: INTERVENTIONS:  - Assess and monitor for signs and symptoms of infection  - Monitor lab/diagnostic results  - Monitor all insertion sites, i.e. indwelling lines, tubes, and drains  - Monitor endotracheal if appropriate and nasal secretions for changes in amount and color  - [place] appropriate cooling/warming therapies per order  - Administer medications as ordered  - Instruct and encourage patient and family to use good hand hygiene technique  - Identify and instruct in appropriate isolation precautions for identified infection/condition  Outcome: Progressing     Problem: Knowledge Deficit  Goal: Patient/family/caregiver demonstrates understanding of disease process, treatment plan, medications, and discharge instructions  Description: Complete learning assessment and assess knowledge base.  Interventions:  - Provide teaching at level of understanding  - Provide teaching via preferred learning methods  Outcome: Progressing

## 2025-03-07 ENCOUNTER — HOSPITAL ENCOUNTER (OUTPATIENT)
Dept: INFUSION CENTER | Facility: HOSPITAL | Age: 50
End: 2025-03-07
Payer: COMMERCIAL

## 2025-03-07 VITALS
RESPIRATION RATE: 18 BRPM | OXYGEN SATURATION: 99 % | TEMPERATURE: 97 F | DIASTOLIC BLOOD PRESSURE: 84 MMHG | SYSTOLIC BLOOD PRESSURE: 145 MMHG | HEART RATE: 93 BPM

## 2025-03-07 DIAGNOSIS — D50.0 IRON DEFICIENCY ANEMIA DUE TO CHRONIC BLOOD LOSS: Primary | ICD-10-CM

## 2025-03-07 DIAGNOSIS — R19.7 VOMITING AND DIARRHEA: ICD-10-CM

## 2025-03-07 DIAGNOSIS — R63.0 ANOREXIA: ICD-10-CM

## 2025-03-07 DIAGNOSIS — E44.1 MILD PROTEIN-CALORIE MALNUTRITION (HCC): ICD-10-CM

## 2025-03-07 DIAGNOSIS — E61.1 IRON DEFICIENCY: ICD-10-CM

## 2025-03-07 DIAGNOSIS — R11.10 VOMITING AND DIARRHEA: ICD-10-CM

## 2025-03-07 PROCEDURE — 96375 TX/PRO/DX INJ NEW DRUG ADDON: CPT

## 2025-03-07 PROCEDURE — 96365 THER/PROPH/DIAG IV INF INIT: CPT

## 2025-03-07 PROCEDURE — 96367 TX/PROPH/DG ADDL SEQ IV INF: CPT

## 2025-03-07 PROCEDURE — 96366 THER/PROPH/DIAG IV INF ADDON: CPT

## 2025-03-07 RX ORDER — METHYLPREDNISOLONE SODIUM SUCCINATE 40 MG/ML
40 INJECTION, POWDER, LYOPHILIZED, FOR SOLUTION INTRAMUSCULAR; INTRAVENOUS ONCE
Status: CANCELLED
Start: 2025-03-11 | End: 2025-03-08

## 2025-03-07 RX ORDER — METHYLPREDNISOLONE SODIUM SUCCINATE 40 MG/ML
40 INJECTION, POWDER, LYOPHILIZED, FOR SOLUTION INTRAMUSCULAR; INTRAVENOUS ONCE
Status: COMPLETED | OUTPATIENT
Start: 2025-03-07 | End: 2025-03-07

## 2025-03-07 RX ORDER — SODIUM CHLORIDE, SODIUM GLUCONATE, SODIUM ACETATE, POTASSIUM CHLORIDE, MAGNESIUM CHLORIDE, SODIUM PHOSPHATE, DIBASIC, AND POTASSIUM PHOSPHATE .53; .5; .37; .037; .03; .012; .00082 G/100ML; G/100ML; G/100ML; G/100ML; G/100ML; G/100ML; G/100ML
1500 INJECTION, SOLUTION INTRAVENOUS ONCE
Status: COMPLETED | OUTPATIENT
Start: 2025-03-07 | End: 2025-03-07

## 2025-03-07 RX ORDER — SODIUM CHLORIDE, SODIUM GLUCONATE, SODIUM ACETATE, POTASSIUM CHLORIDE, MAGNESIUM CHLORIDE, SODIUM PHOSPHATE, DIBASIC, AND POTASSIUM PHOSPHATE .53; .5; .37; .037; .03; .012; .00082 G/100ML; G/100ML; G/100ML; G/100ML; G/100ML; G/100ML; G/100ML
1500 INJECTION, SOLUTION INTRAVENOUS ONCE
Status: CANCELLED
Start: 2025-03-11 | End: 2025-03-08

## 2025-03-07 RX ADMIN — DIPHENHYDRAMINE HYDROCHLORIDE 50 MG: 50 INJECTION INTRAMUSCULAR; INTRAVENOUS at 11:19

## 2025-03-07 RX ADMIN — METHYLPREDNISOLONE SODIUM SUCCINATE 40 MG: 40 INJECTION, POWDER, FOR SOLUTION INTRAMUSCULAR; INTRAVENOUS at 12:02

## 2025-03-07 RX ADMIN — FAMOTIDINE 40 MG: 10 INJECTION, SOLUTION INTRAVENOUS at 11:40

## 2025-03-07 RX ADMIN — SODIUM CHLORIDE, SODIUM GLUCONATE, SODIUM ACETATE, POTASSIUM CHLORIDE, MAGNESIUM CHLORIDE, SODIUM PHOSPHATE, DIBASIC, AND POTASSIUM PHOSPHATE 1500 ML: .53; .5; .37; .037; .03; .012; .00082 INJECTION, SOLUTION INTRAVENOUS at 12:05

## 2025-03-07 NOTE — PROGRESS NOTES
Celine Roa  tolerated IV hydration well with no complications.      Celine Roa is aware of future appt on 3/11 at 8:30AM at [facility].     AVS declined by Celine Roa.

## 2025-03-10 DIAGNOSIS — K50.119 CROHN'S DISEASE OF COLON WITH COMPLICATION (HCC): Primary | ICD-10-CM

## 2025-03-10 RX ORDER — SODIUM CHLORIDE 9 MG/ML
20 INJECTION, SOLUTION INTRAVENOUS ONCE
Status: CANCELLED | OUTPATIENT
Start: 2025-03-11

## 2025-03-11 ENCOUNTER — OFFICE VISIT (OUTPATIENT)
Dept: BEHAVIORAL/MENTAL HEALTH CLINIC | Facility: CLINIC | Age: 50
End: 2025-03-11
Payer: COMMERCIAL

## 2025-03-11 ENCOUNTER — HOSPITAL ENCOUNTER (OUTPATIENT)
Dept: INFUSION CENTER | Facility: HOSPITAL | Age: 50
Discharge: HOME | End: 2025-03-11
Payer: COMMERCIAL

## 2025-03-11 VITALS
SYSTOLIC BLOOD PRESSURE: 110 MMHG | RESPIRATION RATE: 17 BRPM | DIASTOLIC BLOOD PRESSURE: 81 MMHG | TEMPERATURE: 97.3 F | HEART RATE: 104 BPM

## 2025-03-11 DIAGNOSIS — E61.1 IRON DEFICIENCY: ICD-10-CM

## 2025-03-11 DIAGNOSIS — R19.7 VOMITING AND DIARRHEA: Primary | ICD-10-CM

## 2025-03-11 DIAGNOSIS — E44.1 MILD PROTEIN-CALORIE MALNUTRITION (HCC): ICD-10-CM

## 2025-03-11 DIAGNOSIS — R63.0 ANOREXIA: ICD-10-CM

## 2025-03-11 DIAGNOSIS — R11.10 VOMITING AND DIARRHEA: Primary | ICD-10-CM

## 2025-03-11 DIAGNOSIS — F41.1 GENERALIZED ANXIETY DISORDER: Primary | ICD-10-CM

## 2025-03-11 DIAGNOSIS — D50.0 IRON DEFICIENCY ANEMIA DUE TO CHRONIC BLOOD LOSS: ICD-10-CM

## 2025-03-11 PROCEDURE — 96366 THER/PROPH/DIAG IV INF ADDON: CPT

## 2025-03-11 PROCEDURE — 96375 TX/PRO/DX INJ NEW DRUG ADDON: CPT

## 2025-03-11 PROCEDURE — 96367 TX/PROPH/DG ADDL SEQ IV INF: CPT

## 2025-03-11 PROCEDURE — 90834 PSYTX W PT 45 MINUTES: CPT

## 2025-03-11 PROCEDURE — 96365 THER/PROPH/DIAG IV INF INIT: CPT

## 2025-03-11 RX ORDER — SODIUM CHLORIDE, SODIUM GLUCONATE, SODIUM ACETATE, POTASSIUM CHLORIDE, MAGNESIUM CHLORIDE, SODIUM PHOSPHATE, DIBASIC, AND POTASSIUM PHOSPHATE .53; .5; .37; .037; .03; .012; .00082 G/100ML; G/100ML; G/100ML; G/100ML; G/100ML; G/100ML; G/100ML
1500 INJECTION, SOLUTION INTRAVENOUS ONCE
Status: COMPLETED | OUTPATIENT
Start: 2025-03-11 | End: 2025-03-11

## 2025-03-11 RX ORDER — METHYLPREDNISOLONE SODIUM SUCCINATE 40 MG/ML
40 INJECTION, POWDER, LYOPHILIZED, FOR SOLUTION INTRAMUSCULAR; INTRAVENOUS ONCE
Status: CANCELLED
Start: 2025-03-13 | End: 2025-03-12

## 2025-03-11 RX ORDER — METHYLPREDNISOLONE SODIUM SUCCINATE 40 MG/ML
40 INJECTION, POWDER, LYOPHILIZED, FOR SOLUTION INTRAMUSCULAR; INTRAVENOUS ONCE
Status: COMPLETED | OUTPATIENT
Start: 2025-03-11 | End: 2025-03-11

## 2025-03-11 RX ORDER — SODIUM CHLORIDE, SODIUM GLUCONATE, SODIUM ACETATE, POTASSIUM CHLORIDE, MAGNESIUM CHLORIDE, SODIUM PHOSPHATE, DIBASIC, AND POTASSIUM PHOSPHATE .53; .5; .37; .037; .03; .012; .00082 G/100ML; G/100ML; G/100ML; G/100ML; G/100ML; G/100ML; G/100ML
1500 INJECTION, SOLUTION INTRAVENOUS ONCE
Status: CANCELLED
Start: 2025-03-13 | End: 2025-03-12

## 2025-03-11 RX ADMIN — METHYLPREDNISOLONE SODIUM SUCCINATE 40 MG: 40 INJECTION, POWDER, FOR SOLUTION INTRAMUSCULAR; INTRAVENOUS at 10:46

## 2025-03-11 RX ADMIN — DIPHENHYDRAMINE HYDROCHLORIDE 50 MG: 50 INJECTION, SOLUTION INTRAMUSCULAR; INTRAVENOUS at 09:41

## 2025-03-11 RX ADMIN — SODIUM CHLORIDE, SODIUM GLUCONATE, SODIUM ACETATE, POTASSIUM CHLORIDE, MAGNESIUM CHLORIDE, SODIUM PHOSPHATE, DIBASIC, AND POTASSIUM PHOSPHATE 1500 ML: .53; .5; .37; .037; .03; .012; .00082 INJECTION, SOLUTION INTRAVENOUS at 10:56

## 2025-03-11 RX ADMIN — FAMOTIDINE 40 MG: 10 INJECTION, SOLUTION INTRAVENOUS at 10:15

## 2025-03-11 NOTE — PROGRESS NOTES
Pt tolerated pre-meds and IV hydration 1.5L isolyte well without any complications.  PIV removed without incident.      Celine Roa is aware of future appt on 3/13/25 at 8:30AM.     AVS declined by Celine Roa.     Pt discharged off unit in stable condition with all personal belongings.

## 2025-03-11 NOTE — PLAN OF CARE
Problem: Potential for Falls  Goal: Patient will remain free of falls  Description: INTERVENTIONS:  - Educate patient/family on patient safety including physical limitations  - Instruct patient to call for assistance with activity   - Consult OT/PT to assist with strengthening/mobility   - Keep Call bell within reach  - Keep bed low and locked with side rails adjusted as appropriate  - Keep care items and personal belongings within reach  - Initiate and maintain comfort rounds  - Make Fall Risk Sign visible to staff  - Consider moving patient to room near nurses station  Outcome: Progressing     Problem: INFECTION - ADULT  Goal: Absence or prevention of progression during hospitalization  Description: INTERVENTIONS:  - Assess and monitor for signs and symptoms of infection  - Monitor lab/diagnostic results  - Monitor all insertion sites, i.e. indwelling lines, tubes, and drains  - Monitor endotracheal if appropriate and nasal secretions for changes in amount and color  - [place] appropriate cooling/warming therapies per order  - Administer medications as ordered  - Instruct and encourage patient and family to use good hand hygiene technique  - Identify and instruct in appropriate isolation precautions for identified infection/condition  Outcome: Progressing     Problem: Knowledge Deficit  Goal: Patient/family/caregiver demonstrates understanding of disease process, treatment plan, medications, and discharge instructions  Description: Complete learning assessment and assess knowledge base.  Interventions:  - Provide teaching at level of understanding  - Provide teaching via preferred learning methods  Outcome: Progressing

## 2025-03-12 ENCOUNTER — OFFICE VISIT (OUTPATIENT)
Dept: NEUROSURGERY | Facility: CLINIC | Age: 50
End: 2025-03-12
Payer: COMMERCIAL

## 2025-03-12 VITALS — DIASTOLIC BLOOD PRESSURE: 87 MMHG | SYSTOLIC BLOOD PRESSURE: 114 MMHG | TEMPERATURE: 98.1 F | HEART RATE: 86 BPM

## 2025-03-12 DIAGNOSIS — M51.16 LUMBAR DISC HERNIATION WITH RADICULOPATHY: Primary | ICD-10-CM

## 2025-03-12 DIAGNOSIS — M54.16 LUMBAR RADICULOPATHY: ICD-10-CM

## 2025-03-12 DIAGNOSIS — K51.90 ULCERATIVE COLITIS (HCC): ICD-10-CM

## 2025-03-12 PROCEDURE — 99213 OFFICE O/P EST LOW 20 MIN: CPT | Performed by: NEUROLOGICAL SURGERY

## 2025-03-12 NOTE — PROGRESS NOTES
Name: Celine Roa      : 1975      MRN: [id number]  Encounter Provider: Brennan Telles MD  Encounter Date: 3/12/2025   Encounter department: [facility] [place]  :  Assessment & Plan  Lumbar disc herniation with radiculopathy         Lumbar radiculopathy         Ulcerative colitis (HCC)         50-year-old woman previously seen by our AP, GB.  Suffered a fall in 2025, and since has had significant back pain.  On follow-up today, she states her back pain is 6/10.  Previously she had had significant radiating leg pain, although today this is numbness.  That she describes specifically as some numbness in her lateral/posterior calf, sole of her foot.  No radiating pain.  No weakness.  Low back pain worsened with sitting.  Has improved since working with her chiropractor over the last week or so.    I discussed with her and her  the natural history of lumbar disc disease, and what surgery tends to be effective for, namely radicular pain/sciatica.  I am not confident it would offer significant benefit presently.  That, and she has serious issues with interventions, and reactions to that secondary to her muscle disease etc.  As such, we have deferred on surgery for now.  She will contact us with any worsening of her symptoms, unrelenting sciatica etc.    History of Present Illness     Celine Roa is a 50 y.o. female who presents for follow-up    Back Pain  Associated symptoms include numbness and weakness.      Pain Score:   6 - back pain    Review of Systems   Constitutional: Negative.    HENT: Negative.     Eyes: Negative.    Respiratory: Negative.     Cardiovascular: Negative.    Gastrointestinal: Negative.    Endocrine: Negative.    Genitourinary:  Positive for urgency.   Musculoskeletal:  Positive for back pain.        Discuss surgical options   L/s Mri on 25   6/10 Lbp radiates into tail bone x 2,months  + N/T/W on RLE laterally into pinky toe    Chiropractor   mild relief  PT/PM/NORMA-- none  No AC/ non-smoker/ no previous back sx   Skin: Negative.    Allergic/Immunologic: Negative.    Neurological:  Positive for weakness and numbness.   Hematological: Negative.    Psychiatric/Behavioral: Negative.     All other systems reviewed and are negative.    I have personally reviewed the MA's review of systems and made changes as necessary.    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 pt denies, documented in cardio note   • Pain of right thigh 02/13/2023   • Pancreatitis     sphinger of    • Psychiatric disorder    • RA (rheumatoid arthritis) ([facility])    • Seizures ([facility])    • Sepsis without acute organ dysfunction ([facility]) 2/15/2024   • SIRS (systemic inflammatory response syndrome) ([facility]) 8/25/2017   • Thrombocytosis 04/05/2022   • Ulcerative colitis ([facility])    • Visual impairment      Past Surgical History:   Procedure Laterality Date   • ABDOMINAL SURGERY  2017   • APPENDECTOMY     • CHOLECYSTECTOMY     • COLONOSCOPY  2019   • CYSTOSCOPY N/A 06/08/2023    Procedure: CYSTOSCOPY, mini TURBT with fulgeration;  Surgeon: Tee Bruno MD;  Location: [facility] OR;  Service: Urology   • EGD AND COLONOSCOPY N/A 09/12/2017    Procedure: EGD AND COLONOSCOPY;  Surgeon: Tommy Oliver MD;  Location:   GI LAB;  Service: Gastroenterology   • ERCP N/A 09/15/2017    Procedure: ENDOSCOPIC RETROGRADE CHOLANGIOPANCREATOGRAPHY (ERCP);  Surgeon: Dre Soto MD;  Location: [facility];  Service: Gastroenterology   • ERCP  02/14/2024   • HYSTERECTOMY     • KNEE SURGERY Right    • LAPAROSCOPIC ENDOMETRIOSIS FULGURATION     • ORIF ANKLE FRACTURE Left    • NH ARTHRS KNE SURG W/MENISCECTOMY MED/LAT W/SHVG Left 05/12/2017    Procedure: POSSIBLE MEDIAL MENISECTOMY;  Surgeon: Kristian Avila MD;  Location: [facility];  Service: Orthopedics   • NH ARTHRS KNEE DEBRIDEMENT/SHAVING ARTCLR CRTLG Left 05/12/2017    Procedure: KNEE ARTHROSCOPY EVALUATION, CHONDROPLASTY;  Surgeon: Kristian Avila MD;  Location: [facility];  Service: Orthopedics   • NH LAPS ABD PRTM&OMENTUM DX W/WO SPEC BR/WA SPX N/A 12/12/2017    Procedure: LAPAROSCOPY DIAGNOSTIC  WITH LYSIS OF ADHESIONS;  Surgeon: Olaf John DO;  Location:  MAIN OR;  Service: General   • UPPER GASTROINTESTINAL ENDOSCOPY  2019     Family History   Problem Relation Age of Onset   • Ulcerative colitis Mother    • Arthritis Mother    • Colon polyps Mother    • Heart failure Father    • Neuropathy Father    • Macular degeneration Father    • Diabetes Father    • Early death Father    • Vision loss Father    • Asthma Daughter    • Hypothyroidism Daughter    • Heart disease Maternal Grandmother    • Arthritis Maternal Grandmother    • No Known Problems Maternal Grandfather    • Arthritis Paternal Grandmother    • Macular degeneration Paternal Grandmother    • Vision loss Paternal Grandmother    • Lymphoma Paternal Grandfather    • Cancer Paternal Grandfather    • Early death Paternal Grandfather    • Breast cancer Maternal Aunt    • Cancer Maternal Aunt    • Miscarriages / Stillbirths Maternal Aunt    • Heart failure Paternal Aunt    • Heart failure Paternal Aunt    • Irritable bowel syndrome Paternal Aunt    • Breast cancer Paternal Aunt 54   • Breast cancer additional onset Paternal Aunt 58   •  Hypothyroidism Paternal Aunt    • Cancer Paternal Aunt    • No Known Problems Son    • No Known Problems Son    • Kidney disease Brother    • Depression Paternal Uncle    • Early death Paternal Uncle    • Depression Paternal Uncle    • Early death Paternal Uncle      she reports that she has never smoked. She has never used smokeless tobacco. She reports that she does not drink alcohol and does not use drugs.  Current Outpatient Medications   Medication Instructions   • B Complex Vitamins (VITAMIN B COMPLEX PO) 100 mg, Daily   • cetirizine (ZYRTEC) 20 mg, 2 times daily   • cholecalciferol (VITAMIN D3) 5,000 Units, Daily   • cyanocobalamin (VITAMIN B-12) 100 mcg tablet Daily   • diazepam (VALIUM) 5 mg, Oral, Every 8 hours PRN   • diphenhydrAMINE (BENADRYL) 50 mg, 4 times daily PRN   • diphenhydrAMINE (BENADRYL) 50 mg, Intravenous, Every 6 hours PRN   • EPINEPHrine (EPIPEN) 0.3 mg, Intramuscular, Daily PRN, No substitutions with generic   • famotidine (PEPCID) 40 mg, Oral, Daily   • furosemide (LASIX) 20 mg, As needed   • hydrOXYzine HCL (ATARAX) 25 mg, Oral, Daily PRN   • hyoscyamine (ANASPAZ,LEVSIN) 0.125 mg, Every 6 hours PRN   • Lactobacillus (PROBIOTIC ACIDOPHILUS PO) Take by mouth   • Lactobacillus-Inulin ([employer]) CAPS 200 mg, Daily   • levalbuterol (XOPENEX HFA) 45 mcg/act inhaler 1-2 puffs, Inhalation, Every 4 hours PRN   • levothyroxine 75 mcg, Daily   • liothyronine (CYTOMEL) 5 mcg, Oral, Daily   • magnesium gluconate (MAGONATE) 500 mg, 2 times daily   • methocarbamol (ROBAXIN-750) 750 mg, Oral, Every 8 hours PRN   • mometasone (ELOCON) 0.1 % cream Topical, Daily   • montelukast (SINGULAIR) 10 mg, Oral, Daily at bedtime   • PEPPERMINT OIL PO 1 capsule, 2 times daily   • predniSONE 20 mg tablet Take 40mg per day for 1 week, then 30mg per day for 1 week, then 20mg per day for 1 week and then 10mg per day for 1 week.   • Probiotic Product (CULTURELLE ABDOMINAL SUPPORT PO) 200 mg, Daily   •  Rinvoq 45 mg, Oral, Daily, Induction dosing   • Vitamin B-2 200 mg, 2 times daily   • vitamin C 500 mg, 2 times daily     Allergies   Allergen Reactions   • Aimovig [Erenumab-Aooe] Anaphylaxis   • Amitriptyline Anaphylaxis     According to the patient she had nphylaxis   • Aspergillus Allergy Skin Test Other (See Comments), Hives and Swelling   • Azathioprine Other (See Comments) and Anaphylaxis     pancreatitis  According to patient she needed Epipen   • Buspar [Buspirone] Hives and Shortness Of Breath   • Citric Acid-Polysorbate 80 Abdominal Pain, Anaphylaxis, Anxiety, Bradycardia, Diarrhea, Fatigue, GI Intolerance, Headache, Hives, Hyperactivity, Itching, Lip Swelling, Nausea Only, Palpitations, Rash, Shortness Of Breath, Tachycardia, Throat Swelling, Tongue Swelling and Vomiting   • Corn Dextrin Anaphylaxis     Any corn based products   • Erenumab Anaphylaxis     patient sent portal message stating she had anaphylaxis  patient sent portal message stating she had anaphylaxis     • Gadolinium Abdominal Pain, Anaphylaxis, Anxiety, Confusion, Delirium, Dizziness, Eye Swelling, Fatigue, GI Intolerance, Headache, Hives, Irritability, Itching, Lip Swelling, Nausea Only, Palpitations, Photosensitivity, Rash, Seizures, Shortness Of Breath, Swelling, Syncope, Throat Swelling, Tongue Swelling, Tremor, Visual Disturbance and Vomiting   • Gelatin - Food Allergy Anaphylaxis   • Heparin Hives     Painful welts    • Lavender Oil Anaphylaxis     Other reaction(s): anaphalxis   • Malto Dextrin [Dextrin] Anaphylaxis   • Penicillium Notatum (Diagnost) Shortness Of Breath and Swelling   • Red Dye - Food Allergy Anaphylaxis and Other (See Comments)     intolerance   • Sumatriptan Anaphylaxis     Bradycardia, sob, back pressure, head pain,throat tightness  According to the patient she had anphylaxis   • Ustekinumab (Murine) Anaphylaxis   • Lidocaine Hives, Itching and Rash   • Contrast [Iodinated Contrast Media] Other (See Comments)  "    Pt states needs to be premedicated prior to injection   • Corn Oil - Food Allergy - Food Allergy Hives   • Humira [Adalimumab] Other (See Comments)     \"I develop drug induced lupus\"   • Ibuprofen Hives and Throat Swelling   • Polyethylene Glycol Diarrhea and Vomiting   • Remicade [Infliximab] Other (See Comments)     \"I develop drug induced lupus\"   • Sulfa Antibiotics Hives and Other (See Comments)   • Sulfate Hives   • Sulfites - Food Allergy Hives   • Sweet Corn Extract Skin Test - Food Allergy Hives and Itching   • Chlorhexidine Itching   • Freederm Adhesive Remover [New Skin] Rash   • Histamine Hives, Rash and Other (See Comments)     Intolerance     • Wound Dressings Rash      Objective   /87   Pulse 86   Temp 98.1 °F (36.7 °C) (Temporal)     Physical Exam  Neurological Exam              "

## 2025-03-13 ENCOUNTER — HOSPITAL ENCOUNTER (OUTPATIENT)
Dept: INFUSION CENTER | Facility: HOSPITAL | Age: 50
Discharge: HOME | End: 2025-03-13
Payer: COMMERCIAL

## 2025-03-13 VITALS
SYSTOLIC BLOOD PRESSURE: 113 MMHG | TEMPERATURE: 96.7 F | HEART RATE: 89 BPM | DIASTOLIC BLOOD PRESSURE: 80 MMHG | OXYGEN SATURATION: 96 % | RESPIRATION RATE: 16 BRPM

## 2025-03-13 DIAGNOSIS — E61.1 IRON DEFICIENCY: ICD-10-CM

## 2025-03-13 DIAGNOSIS — R19.7 VOMITING AND DIARRHEA: Primary | ICD-10-CM

## 2025-03-13 DIAGNOSIS — R11.10 VOMITING AND DIARRHEA: Primary | ICD-10-CM

## 2025-03-13 DIAGNOSIS — E44.1 MILD PROTEIN-CALORIE MALNUTRITION (HCC): ICD-10-CM

## 2025-03-13 DIAGNOSIS — D50.0 IRON DEFICIENCY ANEMIA DUE TO CHRONIC BLOOD LOSS: ICD-10-CM

## 2025-03-13 DIAGNOSIS — R63.0 ANOREXIA: ICD-10-CM

## 2025-03-13 PROCEDURE — 96375 TX/PRO/DX INJ NEW DRUG ADDON: CPT

## 2025-03-13 PROCEDURE — 96366 THER/PROPH/DIAG IV INF ADDON: CPT

## 2025-03-13 PROCEDURE — 96367 TX/PROPH/DG ADDL SEQ IV INF: CPT

## 2025-03-13 PROCEDURE — 96365 THER/PROPH/DIAG IV INF INIT: CPT

## 2025-03-13 RX ORDER — SODIUM CHLORIDE, SODIUM GLUCONATE, SODIUM ACETATE, POTASSIUM CHLORIDE, MAGNESIUM CHLORIDE, SODIUM PHOSPHATE, DIBASIC, AND POTASSIUM PHOSPHATE .53; .5; .37; .037; .03; .012; .00082 G/100ML; G/100ML; G/100ML; G/100ML; G/100ML; G/100ML; G/100ML
1500 INJECTION, SOLUTION INTRAVENOUS ONCE
Status: COMPLETED | OUTPATIENT
Start: 2025-03-13 | End: 2025-03-13

## 2025-03-13 RX ORDER — METHYLPREDNISOLONE SODIUM SUCCINATE 40 MG/ML
40 INJECTION, POWDER, LYOPHILIZED, FOR SOLUTION INTRAMUSCULAR; INTRAVENOUS ONCE
Status: CANCELLED
Start: 2025-03-14 | End: 2025-03-14

## 2025-03-13 RX ORDER — SODIUM CHLORIDE, SODIUM GLUCONATE, SODIUM ACETATE, POTASSIUM CHLORIDE, MAGNESIUM CHLORIDE, SODIUM PHOSPHATE, DIBASIC, AND POTASSIUM PHOSPHATE .53; .5; .37; .037; .03; .012; .00082 G/100ML; G/100ML; G/100ML; G/100ML; G/100ML; G/100ML; G/100ML
1500 INJECTION, SOLUTION INTRAVENOUS ONCE
Status: CANCELLED
Start: 2025-03-14 | End: 2025-03-14

## 2025-03-13 RX ORDER — METHYLPREDNISOLONE SODIUM SUCCINATE 40 MG/ML
40 INJECTION, POWDER, LYOPHILIZED, FOR SOLUTION INTRAMUSCULAR; INTRAVENOUS ONCE
Status: COMPLETED | OUTPATIENT
Start: 2025-03-13 | End: 2025-03-13

## 2025-03-13 RX ADMIN — DIPHENHYDRAMINE HYDROCHLORIDE 50 MG: 50 INJECTION, SOLUTION INTRAMUSCULAR; INTRAVENOUS at 09:25

## 2025-03-13 RX ADMIN — FAMOTIDINE 40 MG: 10 INJECTION, SOLUTION INTRAVENOUS at 10:00

## 2025-03-13 RX ADMIN — METHYLPREDNISOLONE SODIUM SUCCINATE 40 MG: 40 INJECTION, POWDER, FOR SOLUTION INTRAMUSCULAR; INTRAVENOUS at 10:41

## 2025-03-13 RX ADMIN — SODIUM CHLORIDE, SODIUM GLUCONATE, SODIUM ACETATE, POTASSIUM CHLORIDE, MAGNESIUM CHLORIDE, SODIUM PHOSPHATE, DIBASIC, AND POTASSIUM PHOSPHATE 1500 ML: .53; .5; .37; .037; .03; .012; .00082 INJECTION, SOLUTION INTRAVENOUS at 10:44

## 2025-03-13 NOTE — PROGRESS NOTES
Celine Roa tolerated hydration treatment well with no complications.      Celine Roa is aware of future appt on 3/18/25 at 0800.     AVS declined.

## 2025-03-13 NOTE — PSYCH
"Behavioral Health Psychotherapy Progress Note    Psychotherapy Provided: Individual Psychotherapy     1. Generalized anxiety disorder            Goals addressed in session: Goal 1     DATA: Met with Celine. She expressed she has been feeling \"pretty good\" over the last month. 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 She expressed that this brings her happiness. She reports she will be having an upcoming surgery and this is causing some anxiety. However, she is managing her emotions well. She is reaching out to supports and using coping skills. Session focused on emotion regulation skills and coping skills.   During this session, this clinician used the following therapeutic modalities: Cognitive Behavioral Therapy and Mindfulness-based Strategies    Substance Abuse was not addressed during this session. If the client is diagnosed with a co-occurring substance use disorder, please indicate any changes in the frequency or amount of use: n/a. Stage of change for addressing substance use diagnoses: No substance use/Not applicable    ASSESSMENT:  Celine Roa presents with a Euthymic/ normal mood.     her affect is Normal range and intensity, which is congruent, with her mood and the content of the session. The client has made progress on their goals.     Celine Roa presents with a none risk of suicide, none risk of self-harm, and none risk of harm to others.    For any risk assessment that surpasses a \"low\" rating, a safety plan must be developed.    A safety plan was indicated: no  If yes, describe in detail n/a    PLAN: Between sessions, Celine Roa will use natural supports and coping skills. At the next session, the therapist will use Cognitive Behavioral Therapy and Mindfulness-based Strategies to address emotion regulation .    Behavioral Health Treatment Plan and Discharge Planning: Celine oRa is aware of and agrees to continue to work on their " treatment plan. They have identified and are working toward their discharge goals. yes    Depression Follow-up Plan Completed: Not applicable    Visit start and stop times:    03/11/25  Start Time: 1600  Stop Time: 1650  Total Visit Time: 50 minutes

## 2025-03-14 ENCOUNTER — HOSPITAL ENCOUNTER (OUTPATIENT)
Dept: INFUSION CENTER | Facility: HOSPITAL | Age: 50
End: 2025-03-14
Payer: COMMERCIAL

## 2025-03-14 VITALS
HEART RATE: 116 BPM | RESPIRATION RATE: 16 BRPM | TEMPERATURE: 98 F | DIASTOLIC BLOOD PRESSURE: 75 MMHG | SYSTOLIC BLOOD PRESSURE: 127 MMHG

## 2025-03-14 DIAGNOSIS — E61.1 IRON DEFICIENCY: ICD-10-CM

## 2025-03-14 DIAGNOSIS — D50.0 IRON DEFICIENCY ANEMIA DUE TO CHRONIC BLOOD LOSS: Primary | ICD-10-CM

## 2025-03-14 DIAGNOSIS — R11.10 VOMITING AND DIARRHEA: ICD-10-CM

## 2025-03-14 DIAGNOSIS — E44.1 MILD PROTEIN-CALORIE MALNUTRITION (HCC): ICD-10-CM

## 2025-03-14 DIAGNOSIS — R63.0 ANOREXIA: ICD-10-CM

## 2025-03-14 DIAGNOSIS — R19.7 VOMITING AND DIARRHEA: ICD-10-CM

## 2025-03-14 PROCEDURE — 96365 THER/PROPH/DIAG IV INF INIT: CPT

## 2025-03-14 PROCEDURE — 96375 TX/PRO/DX INJ NEW DRUG ADDON: CPT

## 2025-03-14 PROCEDURE — 96367 TX/PROPH/DG ADDL SEQ IV INF: CPT

## 2025-03-14 PROCEDURE — 96361 HYDRATE IV INFUSION ADD-ON: CPT

## 2025-03-14 RX ORDER — METHYLPREDNISOLONE SODIUM SUCCINATE 40 MG/ML
40 INJECTION, POWDER, LYOPHILIZED, FOR SOLUTION INTRAMUSCULAR; INTRAVENOUS ONCE
Status: CANCELLED
Start: 2025-03-17 | End: 2025-03-15

## 2025-03-14 RX ORDER — METHYLPREDNISOLONE SODIUM SUCCINATE 40 MG/ML
40 INJECTION, POWDER, LYOPHILIZED, FOR SOLUTION INTRAMUSCULAR; INTRAVENOUS ONCE
Status: COMPLETED | OUTPATIENT
Start: 2025-03-14 | End: 2025-03-14

## 2025-03-14 RX ADMIN — DIPHENHYDRAMINE HYDROCHLORIDE 50 MG: 50 INJECTION INTRAMUSCULAR; INTRAVENOUS at 12:09

## 2025-03-14 RX ADMIN — FAMOTIDINE 40 MG: 10 INJECTION, SOLUTION INTRAVENOUS at 12:29

## 2025-03-14 RX ADMIN — METHYLPREDNISOLONE SODIUM SUCCINATE 40 MG: 40 INJECTION, POWDER, FOR SOLUTION INTRAMUSCULAR; INTRAVENOUS at 12:56

## 2025-03-14 RX ADMIN — SODIUM CHLORIDE 1500 ML: 0.9 INJECTION, SOLUTION INTRAVENOUS at 12:02

## 2025-03-14 NOTE — PROGRESS NOTES
Patient tolerated NSS IV hydration without issues.      Celine Roa is aware of future appt on 3/17/25 at 1130 at [facility].     AVS - No (Declined by Celine Roa) Patient has Mychart.    Patient ambulated off unit without incident.  All personal belongings taken with patient.

## 2025-03-17 ENCOUNTER — HOSPITAL ENCOUNTER (OUTPATIENT)
Dept: INFUSION CENTER | Facility: HOSPITAL | Age: 50
Discharge: HOME | End: 2025-03-17
Payer: COMMERCIAL

## 2025-03-17 VITALS
SYSTOLIC BLOOD PRESSURE: 118 MMHG | OXYGEN SATURATION: 98 % | HEART RATE: 112 BPM | DIASTOLIC BLOOD PRESSURE: 84 MMHG | RESPIRATION RATE: 18 BRPM | TEMPERATURE: 98.2 F

## 2025-03-17 DIAGNOSIS — R11.10 VOMITING AND DIARRHEA: ICD-10-CM

## 2025-03-17 DIAGNOSIS — R63.0 ANOREXIA: ICD-10-CM

## 2025-03-17 DIAGNOSIS — E44.1 MILD PROTEIN-CALORIE MALNUTRITION (HCC): ICD-10-CM

## 2025-03-17 DIAGNOSIS — D50.0 IRON DEFICIENCY ANEMIA DUE TO CHRONIC BLOOD LOSS: Primary | ICD-10-CM

## 2025-03-17 DIAGNOSIS — E61.1 IRON DEFICIENCY: ICD-10-CM

## 2025-03-17 DIAGNOSIS — R19.7 VOMITING AND DIARRHEA: ICD-10-CM

## 2025-03-17 PROCEDURE — 96367 TX/PROPH/DG ADDL SEQ IV INF: CPT

## 2025-03-17 PROCEDURE — 96375 TX/PRO/DX INJ NEW DRUG ADDON: CPT

## 2025-03-17 PROCEDURE — 96365 THER/PROPH/DIAG IV INF INIT: CPT

## 2025-03-17 PROCEDURE — 96361 HYDRATE IV INFUSION ADD-ON: CPT

## 2025-03-17 RX ORDER — METHYLPREDNISOLONE SODIUM SUCCINATE 40 MG/ML
40 INJECTION, POWDER, LYOPHILIZED, FOR SOLUTION INTRAMUSCULAR; INTRAVENOUS ONCE
Status: CANCELLED
Start: 2025-03-18 | End: 2025-03-18

## 2025-03-17 RX ORDER — METHYLPREDNISOLONE SODIUM SUCCINATE 40 MG/ML
40 INJECTION, POWDER, LYOPHILIZED, FOR SOLUTION INTRAMUSCULAR; INTRAVENOUS ONCE
Status: COMPLETED | OUTPATIENT
Start: 2025-03-17 | End: 2025-03-17

## 2025-03-17 RX ADMIN — FAMOTIDINE 40 MG: 10 INJECTION, SOLUTION INTRAVENOUS at 12:21

## 2025-03-17 RX ADMIN — METHYLPREDNISOLONE SODIUM SUCCINATE 40 MG: 40 INJECTION, POWDER, FOR SOLUTION INTRAMUSCULAR; INTRAVENOUS at 12:53

## 2025-03-17 RX ADMIN — SODIUM CHLORIDE 1500 ML: 0.9 INJECTION, SOLUTION INTRAVENOUS at 11:57

## 2025-03-17 RX ADMIN — DIPHENHYDRAMINE HYDROCHLORIDE 50 MG: 50 INJECTION, SOLUTION INTRAMUSCULAR; INTRAVENOUS at 11:56

## 2025-03-17 NOTE — PLAN OF CARE
Problem: Potential for Falls  Goal: Patient will remain free of falls  Description: INTERVENTIONS:  - Educate patient/family on patient safety including physical limitations  - Instruct patient to call for assistance with activity   Outcome: Progressing      36.8

## 2025-03-17 NOTE — PROGRESS NOTES
Celine Roa  tolerated hydration treatment well with no complications.      Celine Roa is aware of future appt on 3/18/25 at 8am.     AVS printed and given to Celine Roa:  No (Declined by Celine Roa)

## 2025-03-18 ENCOUNTER — HOSPITAL ENCOUNTER (OUTPATIENT)
Dept: INFUSION CENTER | Facility: HOSPITAL | Age: 50
Discharge: HOME | End: 2025-03-18
Payer: COMMERCIAL

## 2025-03-18 ENCOUNTER — APPOINTMENT (OUTPATIENT)
Dept: LAB | Facility: HOSPITAL | Age: 50
End: 2025-03-18
Payer: COMMERCIAL

## 2025-03-18 VITALS
OXYGEN SATURATION: 98 % | RESPIRATION RATE: 18 BRPM | DIASTOLIC BLOOD PRESSURE: 83 MMHG | SYSTOLIC BLOOD PRESSURE: 121 MMHG | HEART RATE: 109 BPM | TEMPERATURE: 97.9 F

## 2025-03-18 DIAGNOSIS — E61.1 IRON DEFICIENCY: ICD-10-CM

## 2025-03-18 DIAGNOSIS — K50.119 CROHN'S DISEASE OF COLON WITH COMPLICATION (HCC): ICD-10-CM

## 2025-03-18 DIAGNOSIS — R11.10 VOMITING AND DIARRHEA: Primary | ICD-10-CM

## 2025-03-18 DIAGNOSIS — R63.0 ANOREXIA: ICD-10-CM

## 2025-03-18 DIAGNOSIS — D50.0 IRON DEFICIENCY ANEMIA DUE TO CHRONIC BLOOD LOSS: ICD-10-CM

## 2025-03-18 DIAGNOSIS — E44.1 MILD PROTEIN-CALORIE MALNUTRITION (HCC): ICD-10-CM

## 2025-03-18 DIAGNOSIS — R19.7 VOMITING AND DIARRHEA: Primary | ICD-10-CM

## 2025-03-18 PROCEDURE — 80280 DRUG ASSAY VEDOLIZUMAB: CPT

## 2025-03-18 PROCEDURE — 82397 CHEMILUMINESCENT ASSAY: CPT

## 2025-03-18 PROCEDURE — 36415 COLL VENOUS BLD VENIPUNCTURE: CPT

## 2025-03-18 RX ORDER — METHYLPREDNISOLONE SODIUM SUCCINATE 40 MG/ML
40 INJECTION, POWDER, LYOPHILIZED, FOR SOLUTION INTRAMUSCULAR; INTRAVENOUS ONCE
Status: CANCELLED
Start: 2025-03-19 | End: 2025-03-19

## 2025-03-18 RX ORDER — METHYLPREDNISOLONE SODIUM SUCCINATE 40 MG/ML
40 INJECTION, POWDER, LYOPHILIZED, FOR SOLUTION INTRAMUSCULAR; INTRAVENOUS ONCE
Status: COMPLETED | OUTPATIENT
Start: 2025-03-18 | End: 2025-03-18

## 2025-03-18 RX ADMIN — FAMOTIDINE 40 MG: 10 INJECTION, SOLUTION INTRAVENOUS at 08:57

## 2025-03-18 RX ADMIN — DIPHENHYDRAMINE HYDROCHLORIDE 50 MG: 50 INJECTION, SOLUTION INTRAMUSCULAR; INTRAVENOUS at 08:34

## 2025-03-18 RX ADMIN — SODIUM CHLORIDE 1500 ML: 0.9 INJECTION, SOLUTION INTRAVENOUS at 08:35

## 2025-03-18 RX ADMIN — METHYLPREDNISOLONE SODIUM SUCCINATE 40 MG: 40 INJECTION, POWDER, FOR SOLUTION INTRAMUSCULAR; INTRAVENOUS at 09:44

## 2025-03-18 NOTE — PROGRESS NOTES
Celine Roa tolerated hydration treatment well with no complications.      Celine Roa is aware of future appt on 3/25/25 at 0830.     AVS declined.

## 2025-03-18 NOTE — TELEPHONE ENCOUNTER
----- Message from Daria Young RN sent at 3/18/2025 10:51 AM EDT -----  Entyvio level pended. The infusion center would not be able to draw this level from her IV, she will need to go to an outpatient lab.

## 2025-03-18 NOTE — PLAN OF CARE
Problem: Potential for Falls  Goal: Patient will remain free of falls  Description: INTERVENTIONS:  - Educate patient/family on patient safety including physical limitations  - Instruct patient to call for assistance with activity   - Consult OT/PT to assist with strengthening/mobility   Outcome: Progressing

## 2025-03-19 ENCOUNTER — HOSPITAL ENCOUNTER (OUTPATIENT)
Dept: INFUSION CENTER | Facility: HOSPITAL | Age: 50
Discharge: HOME | End: 2025-03-19

## 2025-03-19 ENCOUNTER — HOSPITAL ENCOUNTER (OUTPATIENT)
Dept: INFUSION CENTER | Facility: HOSPITAL | Age: 50
Discharge: HOME | End: 2025-03-19
Payer: COMMERCIAL

## 2025-03-19 VITALS
OXYGEN SATURATION: 98 % | DIASTOLIC BLOOD PRESSURE: 85 MMHG | SYSTOLIC BLOOD PRESSURE: 136 MMHG | HEART RATE: 93 BPM | TEMPERATURE: 97.7 F

## 2025-03-19 DIAGNOSIS — R11.10 VOMITING AND DIARRHEA: ICD-10-CM

## 2025-03-19 DIAGNOSIS — E44.1 MILD PROTEIN-CALORIE MALNUTRITION (HCC): ICD-10-CM

## 2025-03-19 DIAGNOSIS — R19.7 VOMITING AND DIARRHEA: ICD-10-CM

## 2025-03-19 DIAGNOSIS — E61.1 IRON DEFICIENCY: ICD-10-CM

## 2025-03-19 DIAGNOSIS — D50.0 IRON DEFICIENCY ANEMIA DUE TO CHRONIC BLOOD LOSS: Primary | ICD-10-CM

## 2025-03-19 DIAGNOSIS — R63.0 ANOREXIA: ICD-10-CM

## 2025-03-19 RX ORDER — METHYLPREDNISOLONE SODIUM SUCCINATE 40 MG/ML
40 INJECTION, POWDER, LYOPHILIZED, FOR SOLUTION INTRAMUSCULAR; INTRAVENOUS ONCE
Status: COMPLETED | OUTPATIENT
Start: 2025-03-19 | End: 2025-03-19

## 2025-03-19 RX ORDER — METHYLPREDNISOLONE SODIUM SUCCINATE 40 MG/ML
40 INJECTION, POWDER, LYOPHILIZED, FOR SOLUTION INTRAMUSCULAR; INTRAVENOUS ONCE
Status: CANCELLED
Start: 2025-03-20 | End: 2025-03-20

## 2025-03-19 RX ADMIN — DIPHENHYDRAMINE HYDROCHLORIDE 50 MG: 50 INJECTION, SOLUTION INTRAMUSCULAR; INTRAVENOUS at 14:08

## 2025-03-19 RX ADMIN — FAMOTIDINE 40 MG: 10 INJECTION, SOLUTION INTRAVENOUS at 14:40

## 2025-03-19 RX ADMIN — METHYLPREDNISOLONE SODIUM SUCCINATE 40 MG: 40 INJECTION, POWDER, FOR SOLUTION INTRAMUSCULAR; INTRAVENOUS at 15:15

## 2025-03-19 RX ADMIN — SODIUM CHLORIDE 1500 ML: 0.9 INJECTION, SOLUTION INTRAVENOUS at 13:52

## 2025-03-19 NOTE — PROGRESS NOTES
Celine Roa  tolerated hydration/premeds treatment well with no complications.      Celine Roa is aware of future appt on 3/20/25 at 1130am.     AVS printed and given to Celine Roa:   No (Declined by Celine Roa)

## 2025-03-20 ENCOUNTER — HOSPITAL ENCOUNTER (OUTPATIENT)
Dept: GASTROENTEROLOGY | Facility: MEDICAL CENTER | Age: 50
Setting detail: OUTPATIENT SURGERY
Discharge: HOME | End: 2025-03-20
Attending: INTERNAL MEDICINE
Payer: COMMERCIAL

## 2025-03-20 ENCOUNTER — ANESTHESIA (OUTPATIENT)
Dept: GASTROENTEROLOGY | Facility: MEDICAL CENTER | Age: 50
End: 2025-03-20
Payer: COMMERCIAL

## 2025-03-20 ENCOUNTER — HOSPITAL ENCOUNTER (OUTPATIENT)
Dept: INFUSION CENTER | Facility: CLINIC | Age: 50
Discharge: HOME | End: 2025-03-20
Payer: COMMERCIAL

## 2025-03-20 ENCOUNTER — ANESTHESIA EVENT (OUTPATIENT)
Dept: GASTROENTEROLOGY | Facility: MEDICAL CENTER | Age: 50
End: 2025-03-20
Payer: COMMERCIAL

## 2025-03-20 VITALS
DIASTOLIC BLOOD PRESSURE: 81 MMHG | WEIGHT: 156 LBS | HEART RATE: 75 BPM | TEMPERATURE: 96.8 F | OXYGEN SATURATION: 99 % | RESPIRATION RATE: 16 BRPM | SYSTOLIC BLOOD PRESSURE: 120 MMHG | BODY MASS INDEX: 25.96 KG/M2

## 2025-03-20 VITALS
TEMPERATURE: 97.1 F | HEART RATE: 97 BPM | SYSTOLIC BLOOD PRESSURE: 135 MMHG | DIASTOLIC BLOOD PRESSURE: 89 MMHG | RESPIRATION RATE: 17 BRPM

## 2025-03-20 DIAGNOSIS — Z12.11 SCREENING FOR COLON CANCER: ICD-10-CM

## 2025-03-20 DIAGNOSIS — E61.1 IRON DEFICIENCY: ICD-10-CM

## 2025-03-20 DIAGNOSIS — R19.7 VOMITING AND DIARRHEA: Primary | ICD-10-CM

## 2025-03-20 DIAGNOSIS — L50.9 URTICARIA: ICD-10-CM

## 2025-03-20 DIAGNOSIS — E44.1 MILD PROTEIN-CALORIE MALNUTRITION (HCC): ICD-10-CM

## 2025-03-20 DIAGNOSIS — R63.0 ANOREXIA: ICD-10-CM

## 2025-03-20 DIAGNOSIS — K52.9 PROCTOCOLITIS: ICD-10-CM

## 2025-03-20 DIAGNOSIS — M51.16 LUMBAR DISC HERNIATION WITH RADICULOPATHY: Primary | ICD-10-CM

## 2025-03-20 DIAGNOSIS — R11.10 VOMITING AND DIARRHEA: Primary | ICD-10-CM

## 2025-03-20 DIAGNOSIS — D50.0 IRON DEFICIENCY ANEMIA DUE TO CHRONIC BLOOD LOSS: ICD-10-CM

## 2025-03-20 DIAGNOSIS — K21.9 GASTROESOPHAGEAL REFLUX DISEASE, UNSPECIFIED WHETHER ESOPHAGITIS PRESENT: ICD-10-CM

## 2025-03-20 PROCEDURE — 45380 COLONOSCOPY AND BIOPSY: CPT | Performed by: INTERNAL MEDICINE

## 2025-03-20 PROCEDURE — 96365 THER/PROPH/DIAG IV INF INIT: CPT

## 2025-03-20 PROCEDURE — 88305 TISSUE EXAM BY PATHOLOGIST: CPT | Performed by: PATHOLOGY

## 2025-03-20 PROCEDURE — 43235 EGD DIAGNOSTIC BRUSH WASH: CPT | Performed by: INTERNAL MEDICINE

## 2025-03-20 PROCEDURE — 96375 TX/PRO/DX INJ NEW DRUG ADDON: CPT

## 2025-03-20 RX ORDER — METHYLPREDNISOLONE SODIUM SUCCINATE 40 MG/ML
40 INJECTION, POWDER, LYOPHILIZED, FOR SOLUTION INTRAMUSCULAR; INTRAVENOUS ONCE
Status: CANCELLED
Start: 2025-03-21 | End: 2025-03-21

## 2025-03-20 RX ORDER — SODIUM CHLORIDE, SODIUM LACTATE, POTASSIUM CHLORIDE, CALCIUM CHLORIDE 600; 310; 30; 20 MG/100ML; MG/100ML; MG/100ML; MG/100ML
50 INJECTION, SOLUTION INTRAVENOUS CONTINUOUS
Status: CANCELLED | OUTPATIENT
Start: 2025-03-20

## 2025-03-20 RX ORDER — PREDNISONE 10 MG/1
TABLET ORAL
Qty: 21 TABLET | Refills: 0 | Status: SHIPPED | OUTPATIENT
Start: 2025-03-20 | End: 2025-04-03

## 2025-03-20 RX ORDER — PROPOFOL 10 MG/ML
INJECTION, EMULSION INTRAVENOUS AS NEEDED
Status: DISCONTINUED | OUTPATIENT
Start: 2025-03-20 | End: 2025-03-20

## 2025-03-20 RX ORDER — FAMOTIDINE 40 MG/1
40 TABLET, FILM COATED ORAL DAILY
Qty: 30 TABLET | Refills: 3 | Status: SHIPPED | OUTPATIENT
Start: 2025-03-20

## 2025-03-20 RX ORDER — METHYLPREDNISOLONE SODIUM SUCCINATE 40 MG/ML
40 INJECTION, POWDER, LYOPHILIZED, FOR SOLUTION INTRAMUSCULAR; INTRAVENOUS ONCE
Status: COMPLETED | OUTPATIENT
Start: 2025-03-20 | End: 2025-03-20

## 2025-03-20 RX ORDER — SODIUM CHLORIDE, SODIUM LACTATE, POTASSIUM CHLORIDE, CALCIUM CHLORIDE 600; 310; 30; 20 MG/100ML; MG/100ML; MG/100ML; MG/100ML
50 INJECTION, SOLUTION INTRAVENOUS CONTINUOUS
Status: DISCONTINUED | OUTPATIENT
Start: 2025-03-20 | End: 2025-03-24 | Stop reason: HOSPADM

## 2025-03-20 RX ADMIN — PROPOFOL 20 MG: 10 INJECTION, EMULSION INTRAVENOUS at 14:47

## 2025-03-20 RX ADMIN — PROPOFOL 150 MG: 10 INJECTION, EMULSION INTRAVENOUS at 14:38

## 2025-03-20 RX ADMIN — FAMOTIDINE 40 MG: 10 INJECTION INTRAVENOUS at 13:11

## 2025-03-20 RX ADMIN — PROPOFOL 20 MG: 10 INJECTION, EMULSION INTRAVENOUS at 14:40

## 2025-03-20 RX ADMIN — PROPOFOL 20 MG: 10 INJECTION, EMULSION INTRAVENOUS at 14:42

## 2025-03-20 RX ADMIN — DIPHENHYDRAMINE HYDROCHLORIDE 50 MG: 50 INJECTION, SOLUTION INTRAMUSCULAR; INTRAVENOUS at 12:49

## 2025-03-20 RX ADMIN — PROPOFOL 30 MG: 10 INJECTION, EMULSION INTRAVENOUS at 14:51

## 2025-03-20 RX ADMIN — PROPOFOL 20 MG: 10 INJECTION, EMULSION INTRAVENOUS at 14:56

## 2025-03-20 RX ADMIN — PROPOFOL 20 MG: 10 INJECTION, EMULSION INTRAVENOUS at 14:52

## 2025-03-20 RX ADMIN — PROPOFOL 20 MG: 10 INJECTION, EMULSION INTRAVENOUS at 14:44

## 2025-03-20 RX ADMIN — SODIUM CHLORIDE 1500 ML: 9 INJECTION, SOLUTION INTRAVENOUS at 12:49

## 2025-03-20 RX ADMIN — METHYLPREDNISOLONE SODIUM SUCCINATE 40 MG: 40 INJECTION, POWDER, FOR SOLUTION INTRAMUSCULAR; INTRAVENOUS at 13:33

## 2025-03-20 RX ADMIN — SODIUM CHLORIDE, SODIUM LACTATE, POTASSIUM CHLORIDE, AND CALCIUM CHLORIDE 50 ML/HR: .6; .31; .03; .02 INJECTION, SOLUTION INTRAVENOUS at 14:22

## 2025-03-20 RX ADMIN — PROPOFOL 20 MG: 10 INJECTION, EMULSION INTRAVENOUS at 14:49

## 2025-03-20 RX ADMIN — PROPOFOL 20 MG: 10 INJECTION, EMULSION INTRAVENOUS at 14:59

## 2025-03-20 NOTE — H&P
History and Physical - [facility]  Celine Roa 50 y.o. female MRN: [id number]                  HPI: Celine Roa is a 50 y.o. year old female who presents for GERD, Crohn's      REVIEW OF SYSTEMS: Per the HPI, and otherwise unremarkable.    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 pt denies, documented in cardio note    Pain of right thigh 02/13/2023    Pancreatitis     sphinger of     Psychiatric disorder     RA (rheumatoid arthritis) (HCC)     Seizures (HCC)     Sepsis without acute organ dysfunction (HCC) 2/15/2024    SIRS (systemic inflammatory response syndrome) (HCC) 8/25/2017    Thrombocytosis 04/05/2022    Ulcerative colitis (HCC)     Visual impairment      Past Surgical History:   Procedure Laterality Date    ABDOMINAL SURGERY  2017    APPENDECTOMY      CHOLECYSTECTOMY      COLONOSCOPY  2019    CYSTOSCOPY N/A 06/08/2023    Procedure: CYSTOSCOPY, mini TURBT with fulgeration;  Surgeon: Tee Bruno MD;  Location: [facility];  Service: Urology    EGD AND COLONOSCOPY N/A 09/12/2017    Procedure: EGD AND COLONOSCOPY;  Surgeon: Tommy Oliver MD;  Location:  GI LAB;  Service: Gastroenterology    ERCP N/A 09/15/2017    Procedure: ENDOSCOPIC RETROGRADE CHOLANGIOPANCREATOGRAPHY (ERCP);  Surgeon: Dre Soto MD;   Location: [facility];  Service: Gastroenterology    ERCP  02/14/2024    HYSTERECTOMY      KNEE SURGERY Right     LAPAROSCOPIC ENDOMETRIOSIS FULGURATION      ORIF ANKLE FRACTURE Left     CA ARTHRS KNE SURG W/MENISCECTOMY MED/LAT W/SHVG Left 05/12/2017    Procedure: POSSIBLE MEDIAL MENISECTOMY;  Surgeon: Kristian Avila MD;  Location: [facility];  Service: Orthopedics    CA ARTHRS KNEE DEBRIDEMENT/SHAVING ARTCLR CRTLG Left 05/12/2017    Procedure: KNEE ARTHROSCOPY EVALUATION, CHONDROPLASTY;  Surgeon: Kristian Avila MD;  Location: [facility];  Service: Orthopedics    CA LAPS ABD PRTM&OMENTUM DX W/WO SPEC BR/WA SPX N/A 12/12/2017    Procedure: LAPAROSCOPY DIAGNOSTIC  WITH LYSIS OF ADHESIONS;  Surgeon: Olaf John DO;  Location:  MAIN OR;  Service: General    UPPER GASTROINTESTINAL ENDOSCOPY  2019     Social History   Social History     Substance and Sexual Activity   Alcohol Use Never     Social History     Substance and Sexual Activity   Drug Use Never     Social History     Tobacco Use   Smoking Status Never   Smokeless Tobacco Never     Family History   Problem Relation Age of Onset    Ulcerative colitis Mother     Arthritis Mother     Colon polyps Mother     Heart failure Father     Neuropathy Father     Macular degeneration Father     Diabetes Father     Early death Father     Vision loss Father     Asthma Daughter     Hypothyroidism Daughter     Heart disease Maternal Grandmother     Arthritis Maternal Grandmother     No Known Problems Maternal Grandfather     Arthritis Paternal Grandmother     Macular degeneration Paternal Grandmother     Vision loss Paternal Grandmother     Lymphoma Paternal Grandfather     Cancer Paternal Grandfather     Early death Paternal Grandfather     Breast cancer Maternal Aunt     Cancer Maternal Aunt     Miscarriages / Stillbirths Maternal Aunt     Heart failure Paternal Aunt     Heart failure Paternal Aunt     Irritable bowel syndrome Paternal Aunt     Breast cancer Paternal Aunt 54     Breast cancer additional onset Paternal Aunt 58    Hypothyroidism Paternal Aunt     Cancer Paternal Aunt     No Known Problems Son     No Known Problems Son     Kidney disease Brother     Depression Paternal Uncle     Early death Paternal Uncle     Depression Paternal Uncle     Early death Paternal Uncle        Meds/Allergies     Not in a hospital admission.    Allergies   Allergen Reactions    Aimovig [Erenumab-Aooe] Anaphylaxis    Amitriptyline Anaphylaxis     According to the patient she had nphylaxis    Aspergillus Allergy Skin Test Other (See Comments), Hives and Swelling    Azathioprine Other (See Comments) and Anaphylaxis     pancreatitis  According to patient she needed Epipen    Buspar [Buspirone] Hives and Shortness Of Breath    Citric Acid-Polysorbate 80 Abdominal Pain, Anaphylaxis, Anxiety, Bradycardia, Diarrhea, Fatigue, GI Intolerance, Headache, Hives, Hyperactivity, Itching, Lip Swelling, Nausea Only, Palpitations, Rash, Shortness Of Breath, Tachycardia, Throat Swelling, Tongue Swelling and Vomiting    Corn Dextrin Anaphylaxis     Any corn based products    Erenumab Anaphylaxis     patient sent portal message stating she had anaphylaxis  patient sent portal message stating she had anaphylaxis      Gadolinium Abdominal Pain, Anaphylaxis, Anxiety, Confusion, Delirium, Dizziness, Eye Swelling, Fatigue, GI Intolerance, Headache, Hives, Irritability, Itching, Lip Swelling, Nausea Only, Palpitations, Photosensitivity, Rash, Seizures, Shortness Of Breath, Swelling, Syncope, Throat Swelling, Tongue Swelling, Tremor, Visual Disturbance and Vomiting    Gelatin - Food Allergy Anaphylaxis    Heparin Hives     Painful welts     Lavender Oil Anaphylaxis     Other reaction(s): anaphalxis    Malto Dextrin [Dextrin] Anaphylaxis    Penicillium Notatum (Diagnost) Shortness Of Breath and Swelling    Red Dye - Food Allergy Anaphylaxis and Other (See Comments)     intolerance    Sumatriptan Anaphylaxis     Bradycardia,  "sob, back pressure, head pain,throat tightness  According to the patient she had anphylaxis    Ustekinumab (Murine) Anaphylaxis    Lidocaine Hives, Itching and Rash    Contrast [Iodinated Contrast Media] Other (See Comments)     Pt states needs to be premedicated prior to injection    Corn Oil - Food Allergy - Food Allergy Hives    Humira [Adalimumab] Other (See Comments)     \"I develop drug induced lupus\"    Ibuprofen Hives and Throat Swelling    Polyethylene Glycol Diarrhea and Vomiting    Remicade [Infliximab] Other (See Comments)     \"I develop drug induced lupus\"    Sulfa Antibiotics Hives and Other (See Comments)    Sulfate Hives    Sulfites - Food Allergy Hives    Sweet Corn Extract Skin Test - Food Allergy Hives and Itching    Chlorhexidine Itching    Freederm Adhesive Remover [New Skin] Rash    Histamine Hives, Rash and Other (See Comments)     Intolerance      Wound Dressings Rash       Objective     Blood pressure 129/82, pulse 91, temperature (!) 96.8 °F (36 °C), resp. rate 16, weight 70.8 kg (156 lb), SpO2 98%, not currently breastfeeding.      PHYSICAL EXAMINATION:    General Appearance:   Alert, cooperative, no distress   HEENT:  Normocephalic, atraumatic, anicteric. Neck supple, symmetrical, trachea midline.   Lungs:   Equal chest rise and unlabored breathing, normal effort, no coughing.   Cardiovascular:   No visualized JVD.   Abdomen:   No abdominal distension.   Skin:   No jaundice, rashes, or lesions.    Musculoskeletal:   Normal range of motion visualized.   Psych:  Normal affect and normal insight.   Neuro:  Alert and appropriate.           ASSESSMENT/PLAN:  This is a 50 y.o. year old female here for EGD and colonoscopy, and she is stable and optimized for her procedure.        "

## 2025-03-20 NOTE — ANESTHESIA PREPROCEDURE EVALUATION
----- Message from Soraya Marie sent at 12/9/2022 10:10 AM CST -----  Pt would like to do her labs at [facility]. She said it is the one behind or on the side of [facility]. Her appt is 04/04.     [phone]     Procedure:  COLONOSCOPY  EGD    Relevant Problems   CARDIO   (+) Atrial tachycardia (HCC)   (+) Intractable migraine without status migrainosus   (+) Migraine      ENDO   (+) Acquired hypothyroidism      GI/HEPATIC   (+) GERD without esophagitis   (+) Hepatitis   (+) Hiatal hernia   (+) Oral phase dysphagia   (+) Pancreatitis      /RENAL   (+) Nephrolithiasis      HEMATOLOGY   (+) Immunocompromised (HCC)   (+) Iron deficiency anemia due to chronic blood loss      MUSCULOSKELETAL   (+) Chondromalacia   (+) Chronic back pain   (+) Fibromyalgia   (+) Hiatal hernia   (+) Primary localized osteoarthrosis of ankle and foot      NEURO/PSYCH   (+) Anxiety   (+) Chronic back pain   (+) Depression due to physical illness   (+) Fibromyalgia   (+) Generalized anxiety disorder   (+) Intractable migraine without status migrainosus   (+) Major depressive disorder with psychotic features (HCC)   (+) Migraine   (+) Recurrent major depressive disorder, in partial remission (HCC)   (+) Seizure (HCC)      Other   (+) Idiopathic anaphylaxis   (+) 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HCC)        Physical Exam    Airway    Mallampati score: III  TM Distance: >3 FB  Neck ROM: full     Dental   No notable dental hx     Cardiovascular  Cardiovascular exam normal    Pulmonary  Pulmonary exam normal     Other Findings  post-pubertal.      Anesthesia Plan  ASA Score- 3     Anesthesia Type- IV sedation with anesthesia with ASA Monitors.         Additional Monitors:     Airway Plan:            Plan Factors-Exercise tolerance (METS): >4 METS.    Chart reviewed.    Patient summary reviewed.    Patient is not a current smoker.              Induction- intravenous.    Postoperative Plan-         Informed Consent- Anesthetic plan and risks discussed with patient.  I personally reviewed this patient with the CRNA. Discussed and agreed on the Anesthesia Plan with the CRNA..      NPO Status:  Vitals Value Taken Time   Date of last liquid 03/20/25 03/20/25  1403   Time of last liquid 1000 03/20/25 1403   Date of last solid 03/18/25 03/20/25 1403   Time of last solid 1700 03/20/25 1403

## 2025-03-20 NOTE — PROGRESS NOTES
Pt very difficult peripheral stick.  Used vein finder and US machine using 22 g needle .    After 8 attempts and 4 RN's, able to insert 24g needle in pt's right hand.  Pt going to colonoscopy later today.  Pt wants to leave IV in for procedure, but unsure if 24 g needle will be able to be used for this.

## 2025-03-20 NOTE — ANESTHESIA POSTPROCEDURE EVALUATION
Post-Op Assessment Note    CV Status:  Stable    Pain management: satisfactory to patient       Mental Status:  Alert and awake   Hydration Status:  Stable   PONV Controlled:  None   Airway Patency:  Patent     Post Op Vitals Reviewed: Yes    No anethesia notable event occurred.    Staff: CRNA           Last Filed PACU Vitals:  Vitals Value Taken Time   Temp     Pulse 76 03/20/25 1507   /73 03/20/25 1507   Resp 16 03/20/25 1507   SpO2 99 % 03/20/25 1507       Modified Parrish:     Vitals Value Taken Time   Activity 2 03/20/25 1507   Respiration 2 03/20/25 1507   Circulation 2 03/20/25 1507   Consciousness 1 03/20/25 1507   Oxygen Saturation 2 03/20/25 1507     Modified Parrish Score: 9

## 2025-03-20 NOTE — PROGRESS NOTES
Pt states she is ready to leave. Pt going across the street for colonoscopy and requests to leave peripheral IV in place for procedure.  IV fluids stopped per patients request.  Pt did receive all ordered premeds today.

## 2025-03-21 ENCOUNTER — HOSPITAL ENCOUNTER (OUTPATIENT)
Dept: INFUSION CENTER | Facility: HOSPITAL | Age: 50
End: 2025-03-21
Payer: COMMERCIAL

## 2025-03-21 VITALS
TEMPERATURE: 97.3 F | DIASTOLIC BLOOD PRESSURE: 84 MMHG | OXYGEN SATURATION: 100 % | RESPIRATION RATE: 16 BRPM | HEART RATE: 99 BPM | SYSTOLIC BLOOD PRESSURE: 124 MMHG

## 2025-03-21 DIAGNOSIS — E44.1 MILD PROTEIN-CALORIE MALNUTRITION (HCC): ICD-10-CM

## 2025-03-21 DIAGNOSIS — E61.1 IRON DEFICIENCY: ICD-10-CM

## 2025-03-21 DIAGNOSIS — D50.0 IRON DEFICIENCY ANEMIA DUE TO CHRONIC BLOOD LOSS: Primary | ICD-10-CM

## 2025-03-21 DIAGNOSIS — R63.0 ANOREXIA: ICD-10-CM

## 2025-03-21 DIAGNOSIS — R11.10 VOMITING AND DIARRHEA: ICD-10-CM

## 2025-03-21 DIAGNOSIS — R19.7 VOMITING AND DIARRHEA: ICD-10-CM

## 2025-03-21 PROCEDURE — 96375 TX/PRO/DX INJ NEW DRUG ADDON: CPT

## 2025-03-21 PROCEDURE — 96365 THER/PROPH/DIAG IV INF INIT: CPT

## 2025-03-21 PROCEDURE — 96361 HYDRATE IV INFUSION ADD-ON: CPT

## 2025-03-21 RX ORDER — METHYLPREDNISOLONE SODIUM SUCCINATE 40 MG/ML
40 INJECTION, POWDER, LYOPHILIZED, FOR SOLUTION INTRAMUSCULAR; INTRAVENOUS ONCE
Status: COMPLETED | OUTPATIENT
Start: 2025-03-21 | End: 2025-03-21

## 2025-03-21 RX ORDER — METHYLPREDNISOLONE SODIUM SUCCINATE 40 MG/ML
40 INJECTION, POWDER, LYOPHILIZED, FOR SOLUTION INTRAMUSCULAR; INTRAVENOUS ONCE
Status: CANCELLED
Start: 2025-03-25 | End: 2025-03-22

## 2025-03-21 RX ADMIN — DIPHENHYDRAMINE HYDROCHLORIDE 50 MG: 50 INJECTION INTRAMUSCULAR; INTRAVENOUS at 11:18

## 2025-03-21 RX ADMIN — METHYLPREDNISOLONE SODIUM SUCCINATE 40 MG: 40 INJECTION, POWDER, FOR SOLUTION INTRAMUSCULAR; INTRAVENOUS at 11:58

## 2025-03-21 RX ADMIN — SODIUM CHLORIDE 1500 ML: 0.9 INJECTION, SOLUTION INTRAVENOUS at 11:16

## 2025-03-21 RX ADMIN — FAMOTIDINE 40 MG: 10 INJECTION, SOLUTION INTRAVENOUS at 11:38

## 2025-03-21 NOTE — PROGRESS NOTES
Celine Roa  tolerated treatment well with no complications.      Celine Roa is aware of future appt on 3/25 at 8:30 am.     AVS declined by Celine Roa.

## 2025-03-24 ENCOUNTER — HOSPITAL ENCOUNTER (OUTPATIENT)
Dept: INFUSION CENTER | Facility: HOSPITAL | Age: 50
Discharge: HOME | End: 2025-03-24
Payer: COMMERCIAL

## 2025-03-24 VITALS
OXYGEN SATURATION: 97 % | HEART RATE: 107 BPM | DIASTOLIC BLOOD PRESSURE: 81 MMHG | SYSTOLIC BLOOD PRESSURE: 120 MMHG | TEMPERATURE: 97.6 F | RESPIRATION RATE: 16 BRPM

## 2025-03-24 DIAGNOSIS — R19.7 VOMITING AND DIARRHEA: Primary | ICD-10-CM

## 2025-03-24 DIAGNOSIS — D50.0 IRON DEFICIENCY ANEMIA DUE TO CHRONIC BLOOD LOSS: ICD-10-CM

## 2025-03-24 DIAGNOSIS — R63.0 ANOREXIA: ICD-10-CM

## 2025-03-24 DIAGNOSIS — R11.10 VOMITING AND DIARRHEA: Primary | ICD-10-CM

## 2025-03-24 DIAGNOSIS — E44.1 MILD PROTEIN-CALORIE MALNUTRITION (HCC): ICD-10-CM

## 2025-03-24 DIAGNOSIS — E61.1 IRON DEFICIENCY: ICD-10-CM

## 2025-03-24 PROCEDURE — 96367 TX/PROPH/DG ADDL SEQ IV INF: CPT

## 2025-03-24 PROCEDURE — 96361 HYDRATE IV INFUSION ADD-ON: CPT

## 2025-03-24 PROCEDURE — 96375 TX/PRO/DX INJ NEW DRUG ADDON: CPT

## 2025-03-24 PROCEDURE — 96365 THER/PROPH/DIAG IV INF INIT: CPT

## 2025-03-24 RX ORDER — METHYLPREDNISOLONE SODIUM SUCCINATE 40 MG/ML
40 INJECTION, POWDER, LYOPHILIZED, FOR SOLUTION INTRAMUSCULAR; INTRAVENOUS ONCE
Status: COMPLETED | OUTPATIENT
Start: 2025-03-24 | End: 2025-03-24

## 2025-03-24 RX ORDER — METHYLPREDNISOLONE SODIUM SUCCINATE 40 MG/ML
40 INJECTION, POWDER, LYOPHILIZED, FOR SOLUTION INTRAMUSCULAR; INTRAVENOUS ONCE
Status: CANCELLED
Start: 2025-03-25 | End: 2025-03-25

## 2025-03-24 RX ADMIN — DIPHENHYDRAMINE HYDROCHLORIDE 50 MG: 50 INJECTION INTRAMUSCULAR; INTRAVENOUS at 12:08

## 2025-03-24 RX ADMIN — SODIUM CHLORIDE 1500 ML: 0.9 INJECTION, SOLUTION INTRAVENOUS at 12:12

## 2025-03-24 RX ADMIN — FAMOTIDINE 40 MG: 10 INJECTION, SOLUTION INTRAVENOUS at 12:28

## 2025-03-24 RX ADMIN — METHYLPREDNISOLONE SODIUM SUCCINATE 40 MG: 40 INJECTION, POWDER, FOR SOLUTION INTRAMUSCULAR; INTRAVENOUS at 13:00

## 2025-03-24 NOTE — PROGRESS NOTES
Celine Roa  tolerated hydration with premeds well with no complications.      Celine Roa is aware of future appt on 3/25 at 8:30AM.     AVS printed and given to Celine Roa: No (Declined by Celine Rao).

## 2025-03-24 NOTE — PLAN OF CARE
Problem: Potential for Falls  Goal: Patient will remain free of falls  Description: INTERVENTIONS:- Educate patient/family on patient safety including physical limitations- Instruct patient to call for assistance with activity - Consult OT/PT to assist with strengthening/mobility - Keep Call bell within reach- Keep bed low and locked with side rails adjusted as appropriate- Keep care items and personal belongings within reach- Initiate and maintain comfort rounds- Make Fall Risk Sign visible to staff- Apply yellow socks and bracelet for high fall risk patients- Consider moving patient to room near nurses station  Outcome: Progressing

## 2025-03-25 ENCOUNTER — HOSPITAL ENCOUNTER (OUTPATIENT)
Dept: INFUSION CENTER | Facility: HOSPITAL | Age: 50
Discharge: HOME | End: 2025-03-25
Payer: COMMERCIAL

## 2025-03-25 VITALS
HEART RATE: 96 BPM | TEMPERATURE: 96.3 F | SYSTOLIC BLOOD PRESSURE: 128 MMHG | RESPIRATION RATE: 18 BRPM | OXYGEN SATURATION: 100 % | DIASTOLIC BLOOD PRESSURE: 90 MMHG

## 2025-03-25 DIAGNOSIS — E44.1 MILD PROTEIN-CALORIE MALNUTRITION (HCC): ICD-10-CM

## 2025-03-25 DIAGNOSIS — R11.10 VOMITING AND DIARRHEA: Primary | ICD-10-CM

## 2025-03-25 DIAGNOSIS — E61.1 IRON DEFICIENCY: ICD-10-CM

## 2025-03-25 DIAGNOSIS — D50.0 IRON DEFICIENCY ANEMIA DUE TO CHRONIC BLOOD LOSS: ICD-10-CM

## 2025-03-25 DIAGNOSIS — R19.7 VOMITING AND DIARRHEA: Primary | ICD-10-CM

## 2025-03-25 DIAGNOSIS — R63.0 ANOREXIA: ICD-10-CM

## 2025-03-25 RX ORDER — METHYLPREDNISOLONE SODIUM SUCCINATE 40 MG/ML
40 INJECTION, POWDER, LYOPHILIZED, FOR SOLUTION INTRAMUSCULAR; INTRAVENOUS ONCE
Status: CANCELLED
Start: 2025-03-26 | End: 2025-03-26

## 2025-03-25 RX ORDER — METHYLPREDNISOLONE SODIUM SUCCINATE 40 MG/ML
40 INJECTION, POWDER, LYOPHILIZED, FOR SOLUTION INTRAMUSCULAR; INTRAVENOUS ONCE
Status: COMPLETED | OUTPATIENT
Start: 2025-03-25 | End: 2025-03-25

## 2025-03-25 RX ADMIN — METHYLPREDNISOLONE SODIUM SUCCINATE 40 MG: 40 INJECTION, POWDER, FOR SOLUTION INTRAMUSCULAR; INTRAVENOUS at 10:07

## 2025-03-25 RX ADMIN — FAMOTIDINE 40 MG: 10 INJECTION, SOLUTION INTRAVENOUS at 09:25

## 2025-03-25 RX ADMIN — DIPHENHYDRAMINE HYDROCHLORIDE 50 MG: 50 INJECTION, SOLUTION INTRAMUSCULAR; INTRAVENOUS at 09:04

## 2025-03-25 RX ADMIN — SODIUM CHLORIDE 1500 ML: 0.9 INJECTION, SOLUTION INTRAVENOUS at 09:05

## 2025-03-25 NOTE — PROGRESS NOTES
Celine Roa  tolerated hydration/premeds treatment well with no complications.      Celine Roa is aware of future appt on 3/26/25 at 10am.     AVS printed and given to Celine Roa:  No (Declined by Celine Roa)

## 2025-03-26 ENCOUNTER — RESULTS FOLLOW-UP (OUTPATIENT)
Age: 50
End: 2025-03-26

## 2025-03-26 ENCOUNTER — HOSPITAL ENCOUNTER (OUTPATIENT)
Dept: INFUSION CENTER | Facility: HOSPITAL | Age: 50
Discharge: HOME | End: 2025-03-26
Payer: COMMERCIAL

## 2025-03-26 VITALS
OXYGEN SATURATION: 98 % | RESPIRATION RATE: 18 BRPM | TEMPERATURE: 96.9 F | HEART RATE: 80 BPM | DIASTOLIC BLOOD PRESSURE: 98 MMHG | SYSTOLIC BLOOD PRESSURE: 142 MMHG

## 2025-03-26 DIAGNOSIS — D50.0 IRON DEFICIENCY ANEMIA DUE TO CHRONIC BLOOD LOSS: ICD-10-CM

## 2025-03-26 DIAGNOSIS — L50.1 CHRONIC IDIOPATHIC URTICARIA: ICD-10-CM

## 2025-03-26 DIAGNOSIS — R11.10 VOMITING AND DIARRHEA: Primary | ICD-10-CM

## 2025-03-26 DIAGNOSIS — R63.0 ANOREXIA: ICD-10-CM

## 2025-03-26 DIAGNOSIS — E44.1 MILD PROTEIN-CALORIE MALNUTRITION (HCC): ICD-10-CM

## 2025-03-26 DIAGNOSIS — R19.7 VOMITING AND DIARRHEA: Primary | ICD-10-CM

## 2025-03-26 DIAGNOSIS — E61.1 IRON DEFICIENCY: ICD-10-CM

## 2025-03-26 PROCEDURE — 96365 THER/PROPH/DIAG IV INF INIT: CPT

## 2025-03-26 PROCEDURE — 96375 TX/PRO/DX INJ NEW DRUG ADDON: CPT

## 2025-03-26 PROCEDURE — 96361 HYDRATE IV INFUSION ADD-ON: CPT

## 2025-03-26 PROCEDURE — 96367 TX/PROPH/DG ADDL SEQ IV INF: CPT

## 2025-03-26 PROCEDURE — 88305 TISSUE EXAM BY PATHOLOGIST: CPT | Performed by: PATHOLOGY

## 2025-03-26 RX ORDER — METHYLPREDNISOLONE SODIUM SUCCINATE 40 MG/ML
40 INJECTION, POWDER, LYOPHILIZED, FOR SOLUTION INTRAMUSCULAR; INTRAVENOUS ONCE
Status: COMPLETED | OUTPATIENT
Start: 2025-03-26 | End: 2025-03-26

## 2025-03-26 RX ORDER — EPINEPHRINE 0.3 MG/.3ML
0.3 INJECTION SUBCUTANEOUS DAILY PRN
Qty: 0.6 ML | Refills: 3 | Status: CANCELLED | OUTPATIENT
Start: 2025-03-26

## 2025-03-26 RX ORDER — METHYLPREDNISOLONE SODIUM SUCCINATE 40 MG/ML
40 INJECTION, POWDER, LYOPHILIZED, FOR SOLUTION INTRAMUSCULAR; INTRAVENOUS ONCE
Status: CANCELLED
Start: 2025-03-27 | End: 2025-03-27

## 2025-03-26 RX ADMIN — DIPHENHYDRAMINE HYDROCHLORIDE 50 MG: 50 INJECTION INTRAMUSCULAR; INTRAVENOUS at 10:38

## 2025-03-26 RX ADMIN — SODIUM CHLORIDE 1500 ML: 0.9 INJECTION, SOLUTION INTRAVENOUS at 10:29

## 2025-03-26 RX ADMIN — FAMOTIDINE 40 MG: 10 INJECTION, SOLUTION INTRAVENOUS at 11:03

## 2025-03-26 RX ADMIN — METHYLPREDNISOLONE SODIUM SUCCINATE 40 MG: 40 INJECTION, POWDER, FOR SOLUTION INTRAMUSCULAR; INTRAVENOUS at 11:39

## 2025-03-26 NOTE — PROGRESS NOTES
Patient tolerated IV hydration without issues.      Celine Roa is aware of future appt on 3/27/25 at 0830.     AVS -  No (Declined by Celine Roa) Patient has Mychart.    Patient ambulated off unit without incident.  All personal belongings taken with patient.

## 2025-03-26 NOTE — PLAN OF CARE
Problem: Potential for Falls  Goal: Patient will remain free of falls  Description: INTERVENTIONS:- Educate patient/family on patient safety including physical limitations- Instruct patient to call for assistance with activity - Consult OT/PT to assist with strengthening/mobility - Keep Call bell within reach- Keep bed low and locked with side rails adjusted as appropriate- Keep care items and personal belongings within reach- Initiate and maintain comfort rounds- Make Fall Risk Sign visible to staff- Outcome: Progressing

## 2025-03-27 ENCOUNTER — HOSPITAL ENCOUNTER (OUTPATIENT)
Dept: INFUSION CENTER | Facility: HOSPITAL | Age: 50
Discharge: HOME | End: 2025-03-27
Payer: COMMERCIAL

## 2025-03-27 VITALS
HEART RATE: 93 BPM | RESPIRATION RATE: 18 BRPM | TEMPERATURE: 97.3 F | OXYGEN SATURATION: 100 % | DIASTOLIC BLOOD PRESSURE: 57 MMHG | SYSTOLIC BLOOD PRESSURE: 110 MMHG

## 2025-03-27 DIAGNOSIS — R19.7 VOMITING AND DIARRHEA: ICD-10-CM

## 2025-03-27 DIAGNOSIS — D50.0 IRON DEFICIENCY ANEMIA DUE TO CHRONIC BLOOD LOSS: Primary | ICD-10-CM

## 2025-03-27 DIAGNOSIS — R63.0 ANOREXIA: ICD-10-CM

## 2025-03-27 DIAGNOSIS — E61.1 IRON DEFICIENCY: ICD-10-CM

## 2025-03-27 DIAGNOSIS — R11.10 VOMITING AND DIARRHEA: ICD-10-CM

## 2025-03-27 DIAGNOSIS — E44.1 MILD PROTEIN-CALORIE MALNUTRITION (HCC): ICD-10-CM

## 2025-03-27 PROCEDURE — 96365 THER/PROPH/DIAG IV INF INIT: CPT

## 2025-03-27 PROCEDURE — 96375 TX/PRO/DX INJ NEW DRUG ADDON: CPT

## 2025-03-27 PROCEDURE — 96361 HYDRATE IV INFUSION ADD-ON: CPT

## 2025-03-27 RX ORDER — METHYLPREDNISOLONE SODIUM SUCCINATE 40 MG/ML
40 INJECTION, POWDER, LYOPHILIZED, FOR SOLUTION INTRAMUSCULAR; INTRAVENOUS ONCE
Status: COMPLETED | OUTPATIENT
Start: 2025-03-27 | End: 2025-03-27

## 2025-03-27 RX ORDER — METHYLPREDNISOLONE SODIUM SUCCINATE 40 MG/ML
40 INJECTION, POWDER, LYOPHILIZED, FOR SOLUTION INTRAMUSCULAR; INTRAVENOUS ONCE
Status: CANCELLED
Start: 2025-03-28 | End: 2025-03-28

## 2025-03-27 RX ADMIN — DIPHENHYDRAMINE HYDROCHLORIDE 50 MG: 50 INJECTION, SOLUTION INTRAMUSCULAR; INTRAVENOUS at 08:45

## 2025-03-27 RX ADMIN — SODIUM CHLORIDE 1500 ML: 0.9 INJECTION, SOLUTION INTRAVENOUS at 08:54

## 2025-03-27 RX ADMIN — METHYLPREDNISOLONE SODIUM SUCCINATE 40 MG: 40 INJECTION, POWDER, FOR SOLUTION INTRAMUSCULAR; INTRAVENOUS at 10:09

## 2025-03-27 RX ADMIN — FAMOTIDINE 40 MG: 10 INJECTION, SOLUTION INTRAVENOUS at 09:35

## 2025-03-27 NOTE — PLAN OF CARE
Problem: Potential for Falls  Goal: Patient will remain free of falls  Description: INTERVENTIONS:  - Educate patient/family on patient safety including physical limitations  - Instruct patient to call for assistance with activity   - Consult OT/PT to assist with strengthening/mobility   - Keep Call bell within reach  - Keep bed low and locked with side rails adjusted as appropriate  - Keep care items and personal belongings within reach  - Initiate and maintain comfort rounds  - Make Fall Risk Sign visible to staff  - Consider moving patient to room near nurses station  Outcome: Progressing     Problem: INFECTION - ADULT  Goal: Absence or prevention of progression during hospitalization  Description: INTERVENTIONS:  - Assess and monitor for signs and symptoms of infection  - Monitor lab/diagnostic results  - Monitor all insertion sites, i.e. indwelling lines, tubes, and drains  - Monitor endotracheal if appropriate and nasal secretions for changes in amount and color  - Gustine appropriate cooling/warming therapies per order  - Administer medications as ordered  - Instruct and encourage patient and family to use good hand hygiene technique  - Identify and instruct in appropriate isolation precautions for identified infection/condition  Outcome: Progressing     Problem: Knowledge Deficit  Goal: Patient/family/caregiver demonstrates understanding of disease process, treatment plan, medications, and discharge instructions  Description: Complete learning assessment and assess knowledge base.  Interventions:  - Provide teaching at level of understanding  - Provide teaching via preferred learning methods  Outcome: Progressing      Admission

## 2025-03-27 NOTE — PROGRESS NOTES
Celine Roa  tolerated hydration/premed treatment well with no complications.      Celine Roa is aware of future appt on 3/27/25 at 1130am.     AVS printed and given to Celine Roa:  No (Declined by Celine Roa)

## 2025-03-28 ENCOUNTER — HOSPITAL ENCOUNTER (OUTPATIENT)
Dept: INFUSION CENTER | Facility: HOSPITAL | Age: 50
End: 2025-03-28
Payer: COMMERCIAL

## 2025-03-28 VITALS
OXYGEN SATURATION: 98 % | DIASTOLIC BLOOD PRESSURE: 85 MMHG | HEART RATE: 90 BPM | RESPIRATION RATE: 18 BRPM | SYSTOLIC BLOOD PRESSURE: 122 MMHG | TEMPERATURE: 97.1 F

## 2025-03-28 DIAGNOSIS — R11.10 VOMITING AND DIARRHEA: Primary | ICD-10-CM

## 2025-03-28 DIAGNOSIS — D50.0 IRON DEFICIENCY ANEMIA DUE TO CHRONIC BLOOD LOSS: ICD-10-CM

## 2025-03-28 DIAGNOSIS — R63.0 ANOREXIA: ICD-10-CM

## 2025-03-28 DIAGNOSIS — E44.1 MILD PROTEIN-CALORIE MALNUTRITION (HCC): ICD-10-CM

## 2025-03-28 DIAGNOSIS — R19.7 VOMITING AND DIARRHEA: Primary | ICD-10-CM

## 2025-03-28 DIAGNOSIS — E61.1 IRON DEFICIENCY: ICD-10-CM

## 2025-03-28 DIAGNOSIS — K50.119 CROHN'S DISEASE OF COLON WITH COMPLICATION (HCC): ICD-10-CM

## 2025-03-28 PROCEDURE — 96375 TX/PRO/DX INJ NEW DRUG ADDON: CPT

## 2025-03-28 PROCEDURE — 96365 THER/PROPH/DIAG IV INF INIT: CPT

## 2025-03-28 PROCEDURE — 96361 HYDRATE IV INFUSION ADD-ON: CPT

## 2025-03-28 RX ORDER — METHYLPREDNISOLONE SODIUM SUCCINATE 40 MG/ML
40 INJECTION, POWDER, LYOPHILIZED, FOR SOLUTION INTRAMUSCULAR; INTRAVENOUS ONCE
Start: 2025-03-31 | End: 2025-03-29

## 2025-03-28 RX ORDER — METHYLPREDNISOLONE SODIUM SUCCINATE 40 MG/ML
40 INJECTION, POWDER, LYOPHILIZED, FOR SOLUTION INTRAMUSCULAR; INTRAVENOUS ONCE
Status: COMPLETED | OUTPATIENT
Start: 2025-03-28 | End: 2025-03-28

## 2025-03-28 RX ORDER — PREDNISONE 20 MG/1
TABLET ORAL
Qty: 100 TABLET | Refills: 0 | Status: SHIPPED | OUTPATIENT
Start: 2025-03-28

## 2025-03-28 RX ADMIN — DIPHENHYDRAMINE HYDROCHLORIDE 50 MG: 50 INJECTION, SOLUTION INTRAMUSCULAR; INTRAVENOUS at 11:37

## 2025-03-28 RX ADMIN — METHYLPREDNISOLONE SODIUM SUCCINATE 40 MG: 40 INJECTION, POWDER, FOR SOLUTION INTRAMUSCULAR; INTRAVENOUS at 12:46

## 2025-03-28 RX ADMIN — SODIUM CHLORIDE 1500 ML: 0.9 INJECTION, SOLUTION INTRAVENOUS at 11:41

## 2025-03-28 RX ADMIN — FAMOTIDINE 40 MG: 10 INJECTION, SOLUTION INTRAVENOUS at 12:06

## 2025-03-28 NOTE — PROGRESS NOTES
Celine Roa  tolerated hydration treatment well with no complications.      Celine Roa is aware of future appt on 3/31/25 at 0830am.     AVS printed and given to Celine Roa:   No (Declined by Celine Roa)

## 2025-03-28 NOTE — PLAN OF CARE
Problem: Potential for Falls  Goal: Patient will remain free of falls  Description: INTERVENTIONS:- Educate patient/family on patient safety including physical limitations- Instruct patient to call for assistance with activity   Outcome: Progressing

## 2025-03-31 ENCOUNTER — HOSPITAL ENCOUNTER (OUTPATIENT)
Dept: INFUSION CENTER | Facility: HOSPITAL | Age: 50
Discharge: HOME | End: 2025-03-31
Payer: COMMERCIAL

## 2025-03-31 VITALS
RESPIRATION RATE: 18 BRPM | DIASTOLIC BLOOD PRESSURE: 80 MMHG | TEMPERATURE: 97 F | HEART RATE: 99 BPM | SYSTOLIC BLOOD PRESSURE: 114 MMHG

## 2025-03-31 DIAGNOSIS — L50.1 CHRONIC IDIOPATHIC URTICARIA: Primary | ICD-10-CM

## 2025-03-31 DIAGNOSIS — R11.10 VOMITING AND DIARRHEA: ICD-10-CM

## 2025-03-31 DIAGNOSIS — D50.0 IRON DEFICIENCY ANEMIA DUE TO CHRONIC BLOOD LOSS: ICD-10-CM

## 2025-03-31 DIAGNOSIS — E61.1 IRON DEFICIENCY: ICD-10-CM

## 2025-03-31 DIAGNOSIS — E44.1 MILD PROTEIN-CALORIE MALNUTRITION (HCC): ICD-10-CM

## 2025-03-31 DIAGNOSIS — R63.0 ANOREXIA: ICD-10-CM

## 2025-03-31 DIAGNOSIS — R19.7 VOMITING AND DIARRHEA: ICD-10-CM

## 2025-03-31 PROCEDURE — 96375 TX/PRO/DX INJ NEW DRUG ADDON: CPT

## 2025-03-31 PROCEDURE — 96361 HYDRATE IV INFUSION ADD-ON: CPT

## 2025-03-31 PROCEDURE — 96365 THER/PROPH/DIAG IV INF INIT: CPT

## 2025-03-31 PROCEDURE — 96372 THER/PROPH/DIAG INJ SC/IM: CPT

## 2025-03-31 RX ORDER — EPINEPHRINE 1 MG/ML
0.3 INJECTION, SOLUTION, CONCENTRATE INTRAVENOUS
Status: DISCONTINUED | OUTPATIENT
Start: 2025-03-31 | End: 2025-04-03 | Stop reason: HOSPADM

## 2025-03-31 RX ORDER — METHYLPREDNISOLONE SODIUM SUCCINATE 40 MG/ML
40 INJECTION, POWDER, LYOPHILIZED, FOR SOLUTION INTRAMUSCULAR; INTRAVENOUS ONCE
Status: COMPLETED | OUTPATIENT
Start: 2025-03-31 | End: 2025-03-31

## 2025-03-31 RX ORDER — METHYLPREDNISOLONE SODIUM SUCCINATE 40 MG/ML
40 INJECTION, POWDER, LYOPHILIZED, FOR SOLUTION INTRAMUSCULAR; INTRAVENOUS ONCE
Start: 2025-03-31 | End: 2025-04-01

## 2025-03-31 RX ORDER — EPINEPHRINE 1 MG/ML
0.3 INJECTION, SOLUTION, CONCENTRATE INTRAVENOUS
OUTPATIENT
Start: 2025-04-03

## 2025-03-31 RX ADMIN — FAMOTIDINE 40 MG: 10 INJECTION, SOLUTION INTRAVENOUS at 09:13

## 2025-03-31 RX ADMIN — SODIUM CHLORIDE 1500 ML: 0.9 INJECTION, SOLUTION INTRAVENOUS at 08:48

## 2025-03-31 RX ADMIN — METHYLPREDNISOLONE SODIUM SUCCINATE 40 MG: 40 INJECTION, POWDER, FOR SOLUTION INTRAMUSCULAR; INTRAVENOUS at 09:10

## 2025-03-31 RX ADMIN — OMALIZUMAB 300 MG: 150 INJECTION, SOLUTION SUBCUTANEOUS at 11:29

## 2025-03-31 RX ADMIN — DIPHENHYDRAMINE HYDROCHLORIDE 50 MG: 50 INJECTION, SOLUTION INTRAMUSCULAR; INTRAVENOUS at 08:51

## 2025-03-31 NOTE — PROGRESS NOTES
Celine Roa  tolerated treatment well with no complications.      Celine Roa is aware of future appt on 4/23/25 at 1230.

## 2025-04-01 LAB
VEDOLIZUMAB AB SERPL IA-MCNC: <25 NG/ML
VEDOLIZUMAB SERPL IA-MCNC: 27 UG/ML

## 2025-04-02 ENCOUNTER — RESULTS FOLLOW-UP (OUTPATIENT)
Age: 50
End: 2025-04-02

## 2025-04-23 ENCOUNTER — HOSPITAL ENCOUNTER (OUTPATIENT)
Dept: INFUSION CENTER | Facility: HOSPITAL | Age: 50
Discharge: HOME | End: 2025-04-23
Payer: COMMERCIAL

## 2025-04-23 VITALS
RESPIRATION RATE: 18 BRPM | OXYGEN SATURATION: 95 % | HEART RATE: 105 BPM | TEMPERATURE: 97.6 F | SYSTOLIC BLOOD PRESSURE: 123 MMHG | DIASTOLIC BLOOD PRESSURE: 79 MMHG

## 2025-04-23 DIAGNOSIS — D50.0 IRON DEFICIENCY ANEMIA DUE TO CHRONIC BLOOD LOSS: ICD-10-CM

## 2025-04-23 DIAGNOSIS — R19.7 VOMITING AND DIARRHEA: Primary | ICD-10-CM

## 2025-04-23 DIAGNOSIS — R11.10 VOMITING AND DIARRHEA: Primary | ICD-10-CM

## 2025-04-23 DIAGNOSIS — R63.0 ANOREXIA: ICD-10-CM

## 2025-04-23 DIAGNOSIS — E44.1 MILD PROTEIN-CALORIE MALNUTRITION (HCC): ICD-10-CM

## 2025-04-23 DIAGNOSIS — E61.1 IRON DEFICIENCY: ICD-10-CM

## 2025-04-23 PROCEDURE — 96360 HYDRATION IV INFUSION INIT: CPT

## 2025-04-23 PROCEDURE — 96361 HYDRATE IV INFUSION ADD-ON: CPT

## 2025-04-23 RX ORDER — METHYLPREDNISOLONE SODIUM SUCCINATE 40 MG/ML
40 INJECTION, POWDER, LYOPHILIZED, FOR SOLUTION INTRAMUSCULAR; INTRAVENOUS ONCE
Status: CANCELLED
Start: 2025-04-24 | End: 2025-04-24

## 2025-04-23 RX ADMIN — SODIUM CHLORIDE 1500 ML: 9 INJECTION, SOLUTION INTRAVENOUS at 14:09

## 2025-04-23 NOTE — PROGRESS NOTES
Celine Roa  tolerated hydration treatment well with no complications.      Celine Roa is aware of future appt on 4/25/25 at 1200.     AVS printed and given to Celine Roa:   No (Declined by Celine Roa)

## 2025-04-25 ENCOUNTER — HOSPITAL ENCOUNTER (OUTPATIENT)
Dept: INFUSION CENTER | Facility: HOSPITAL | Age: 50
End: 2025-04-25
Payer: COMMERCIAL

## 2025-04-25 VITALS
SYSTOLIC BLOOD PRESSURE: 126 MMHG | HEART RATE: 97 BPM | DIASTOLIC BLOOD PRESSURE: 85 MMHG | TEMPERATURE: 97.5 F | RESPIRATION RATE: 20 BRPM | OXYGEN SATURATION: 97 %

## 2025-04-25 DIAGNOSIS — R63.0 ANOREXIA: ICD-10-CM

## 2025-04-25 DIAGNOSIS — D50.0 IRON DEFICIENCY ANEMIA DUE TO CHRONIC BLOOD LOSS: ICD-10-CM

## 2025-04-25 DIAGNOSIS — R19.7 VOMITING AND DIARRHEA: Primary | ICD-10-CM

## 2025-04-25 DIAGNOSIS — E44.1 MILD PROTEIN-CALORIE MALNUTRITION (HCC): ICD-10-CM

## 2025-04-25 DIAGNOSIS — E61.1 IRON DEFICIENCY: ICD-10-CM

## 2025-04-25 DIAGNOSIS — R11.10 VOMITING AND DIARRHEA: Primary | ICD-10-CM

## 2025-04-25 PROCEDURE — 96361 HYDRATE IV INFUSION ADD-ON: CPT

## 2025-04-25 PROCEDURE — 96360 HYDRATION IV INFUSION INIT: CPT

## 2025-04-25 RX ORDER — METHYLPREDNISOLONE SODIUM SUCCINATE 40 MG/ML
40 INJECTION, POWDER, LYOPHILIZED, FOR SOLUTION INTRAMUSCULAR; INTRAVENOUS ONCE
Status: CANCELLED
Start: 2025-04-25 | End: 2025-04-26

## 2025-04-25 RX ORDER — METHYLPREDNISOLONE SODIUM SUCCINATE 40 MG/ML
40 INJECTION, POWDER, LYOPHILIZED, FOR SOLUTION INTRAMUSCULAR; INTRAVENOUS ONCE
Status: DISCONTINUED | OUTPATIENT
Start: 2025-04-25 | End: 2025-04-28 | Stop reason: HOSPADM

## 2025-04-25 RX ADMIN — SODIUM CHLORIDE 1000 ML: 0.9 INJECTION, SOLUTION INTRAVENOUS at 12:15

## 2025-04-25 NOTE — PROGRESS NOTES
Celine Roa  tolerated hydration treatment well with no complications.      Celine Roa is aware of future appt on 4/28/25 at 0800.     AVS printed and given to Celine Roa:  No (Declined by Celine Roa)

## 2025-04-28 ENCOUNTER — OFFICE VISIT (OUTPATIENT)
Dept: FAMILY MEDICINE CLINIC | Facility: HOME HEALTHCARE | Age: 50
End: 2025-04-28
Payer: COMMERCIAL

## 2025-04-28 ENCOUNTER — HOSPITAL ENCOUNTER (OUTPATIENT)
Dept: INFUSION CENTER | Facility: HOSPITAL | Age: 50
Discharge: HOME | End: 2025-04-28
Payer: COMMERCIAL

## 2025-04-28 VITALS
BODY MASS INDEX: 26.33 KG/M2 | OXYGEN SATURATION: 98 % | HEIGHT: 65 IN | RESPIRATION RATE: 18 BRPM | WEIGHT: 158 LBS | DIASTOLIC BLOOD PRESSURE: 94 MMHG | HEART RATE: 83 BPM | TEMPERATURE: 98.1 F | SYSTOLIC BLOOD PRESSURE: 138 MMHG

## 2025-04-28 VITALS
SYSTOLIC BLOOD PRESSURE: 116 MMHG | OXYGEN SATURATION: 100 % | RESPIRATION RATE: 18 BRPM | DIASTOLIC BLOOD PRESSURE: 80 MMHG | HEART RATE: 99 BPM | TEMPERATURE: 97.3 F

## 2025-04-28 DIAGNOSIS — E61.1 IRON DEFICIENCY: ICD-10-CM

## 2025-04-28 DIAGNOSIS — R11.10 VOMITING AND DIARRHEA: Primary | ICD-10-CM

## 2025-04-28 DIAGNOSIS — E44.1 MILD PROTEIN-CALORIE MALNUTRITION (HCC): ICD-10-CM

## 2025-04-28 DIAGNOSIS — L50.1 CHRONIC IDIOPATHIC URTICARIA: ICD-10-CM

## 2025-04-28 DIAGNOSIS — D50.0 IRON DEFICIENCY ANEMIA DUE TO CHRONIC BLOOD LOSS: ICD-10-CM

## 2025-04-28 DIAGNOSIS — S91.332A PUNCTURE WOUND OF LEFT FOOT, INITIAL ENCOUNTER: Primary | ICD-10-CM

## 2025-04-28 DIAGNOSIS — R63.0 ANOREXIA: ICD-10-CM

## 2025-04-28 DIAGNOSIS — R19.7 VOMITING AND DIARRHEA: Primary | ICD-10-CM

## 2025-04-28 DIAGNOSIS — M32.9 SYSTEMIC LUPUS ERYTHEMATOSUS, UNSPECIFIED SLE TYPE, UNSPECIFIED ORGAN INVOLVEMENT STATUS (HCC): ICD-10-CM

## 2025-04-28 PROCEDURE — 96372 THER/PROPH/DIAG INJ SC/IM: CPT

## 2025-04-28 PROCEDURE — 99213 OFFICE O/P EST LOW 20 MIN: CPT | Performed by: FAMILY MEDICINE

## 2025-04-28 PROCEDURE — 96361 HYDRATE IV INFUSION ADD-ON: CPT

## 2025-04-28 PROCEDURE — 96375 TX/PRO/DX INJ NEW DRUG ADDON: CPT

## 2025-04-28 PROCEDURE — 96365 THER/PROPH/DIAG IV INF INIT: CPT

## 2025-04-28 RX ORDER — EPINEPHRINE 1 MG/ML
0.3 INJECTION, SOLUTION, CONCENTRATE INTRAVENOUS
Status: DISCONTINUED | OUTPATIENT
Start: 2025-04-28 | End: 2025-05-01 | Stop reason: HOSPADM

## 2025-04-28 RX ORDER — METHYLPREDNISOLONE SODIUM SUCCINATE 40 MG/ML
40 INJECTION, POWDER, LYOPHILIZED, FOR SOLUTION INTRAMUSCULAR; INTRAVENOUS ONCE
Start: 2025-04-30 | End: 2025-04-29

## 2025-04-28 RX ORDER — METHYLPREDNISOLONE SODIUM SUCCINATE 40 MG/ML
40 INJECTION, POWDER, LYOPHILIZED, FOR SOLUTION INTRAMUSCULAR; INTRAVENOUS ONCE
Status: COMPLETED | OUTPATIENT
Start: 2025-04-28 | End: 2025-04-28

## 2025-04-28 RX ORDER — EPINEPHRINE 0.3 MG/.3ML
0.3 INJECTION SUBCUTANEOUS DAILY PRN
Qty: 4 EACH | Refills: 3 | Status: SHIPPED | OUTPATIENT
Start: 2025-04-28

## 2025-04-28 RX ORDER — EPINEPHRINE 1 MG/ML
0.3 INJECTION, SOLUTION, CONCENTRATE INTRAVENOUS
OUTPATIENT
Start: 2025-05-01

## 2025-04-28 RX ADMIN — METHYLPREDNISOLONE SODIUM SUCCINATE 40 MG: 40 INJECTION, POWDER, FOR SOLUTION INTRAMUSCULAR; INTRAVENOUS at 09:21

## 2025-04-28 RX ADMIN — DIPHENHYDRAMINE HYDROCHLORIDE 50 MG: 50 INJECTION, SOLUTION INTRAMUSCULAR; INTRAVENOUS at 08:28

## 2025-04-28 RX ADMIN — FAMOTIDINE 40 MG: 10 INJECTION, SOLUTION INTRAVENOUS at 08:53

## 2025-04-28 RX ADMIN — OMALIZUMAB 300 MG: 150 INJECTION, SOLUTION SUBCUTANEOUS at 10:21

## 2025-04-28 RX ADMIN — SODIUM CHLORIDE 1000 ML: 0.9 INJECTION, SOLUTION INTRAVENOUS at 08:25

## 2025-04-28 NOTE — PROGRESS NOTES
"Name: Celine Roa      : 1975      MRN: [id number]  Encounter Provider: Olaf Rhodes MD  Encounter Date: 2025   Encounter department: [facility]  :  Assessment & Plan  Chronic idiopathic urticaria         Puncture wound of left foot, initial encounter    Orders:    XR foot 3+ vw left; Future    Systemic lupus erythematosus, unspecified SLE type, unspecified organ involvement status (HCC)                History of Present Illness   F/up medical problems. Follows GI for IBD and specialists for Mast cell disease. 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 No current issues with swelling or dyspnea. No GI sxs, HA or CP. No URI sxs. Has been more cautious about eating properly and avoiding corn based foods and gluten. Had nail enter dorsum L foot through shoe 4 weeks ago. Did not seek care. Feels round wound looks bigger. No pruritus No discharge and no pain. Scar looks hypopigmented with round , red macule in the middle. Last tetanus 2018.      Review of Systems   Constitutional:  Negative for activity change, appetite change and fever.   Respiratory:  Negative for shortness of breath.    Cardiovascular:  Negative for chest pain and palpitations.   Gastrointestinal:  Negative for abdominal pain, nausea and vomiting.   Genitourinary:  Negative for difficulty urinating.   Neurological:  Negative for headaches.       Objective   /94 (BP Location: Left arm, Patient Position: Sitting, Cuff Size: Standard)   Pulse 83   Temp 98.1 °F (36.7 °C) (Tympanic)   Resp 18   Ht 5' 5\" (1.651 m)   Wt 71.7 kg (158 lb)   SpO2 98%   BMI 26.29 kg/m²      Physical Exam  Vitals reviewed. Nursing note reviewed: repeat 128/84.  Constitutional:       General: She is not in acute distress.     Appearance: Normal appearance.   HENT:      Head: Normocephalic and atraumatic.   Eyes:      General: No scleral icterus.     Conjunctiva/sclera: Conjunctivae " normal.   Cardiovascular:      Rate and Rhythm: Normal rate and regular rhythm.      Pulses: Normal pulses.      Heart sounds: Normal heart sounds. No murmur heard.     No friction rub. No gallop.   Pulmonary:      Effort: Pulmonary effort is normal.      Breath sounds: Normal breath sounds.   Musculoskeletal:      Right lower leg: No edema.      Left lower leg: No edema.   Skin:     General: Skin is warm and dry.      Capillary Refill: Capillary refill takes less than 2 seconds.      Comments: Round, healed macule dorsum L foot with surrounding hypopigmentation; no pain or fluctuance; normal sensation, movement, ROM  and pulses L foot     Neurological:      General: No focal deficit present.      Mental Status: She is alert.   Psychiatric:         Mood and Affect: Mood normal.         Behavior: Behavior normal.

## 2025-04-28 NOTE — PLAN OF CARE
Problem: Knowledge Deficit  Goal: Patient/family/caregiver demonstrates understanding of disease process, treatment plan, medications, and discharge instructions  Description: Complete learning assessment and assess knowledge base.Interventions:- Provide teaching at level of understanding- Provide teaching via preferred learning methods  4/28/2025 0845 by Maribel Kincaid RN  Outcome: Progressing       Problem: INFECTION - ADULT  Goal: Absence of fever/infection during neutropenic period  Description: INTERVENTIONS:- Monitor WBC  4/28/2025 0845 by Maribel Kincaid RN  Outcome: Progressing       Problem: SAFETY ADULT  Goal: Patient will remain free of falls  Description: INTERVENTIONS:- Educate patient/family on patient safety including physical limitations- Instruct patient to call for assistance with activity   4/28/2025 0845 by Maribel Kincaid RN  Outcome: Progressing

## 2025-04-28 NOTE — PROGRESS NOTES
Celine Roa  tolerated hydration/treatment well with no complications.  Xolair injections to abdomen with no issues. Epi Pen expires 2/2026.    Celine Roa is aware of future appt on 4/30/25 at 0800.     AVS printed and given to Celine Roa:  No (Declined by Celine Roa)

## 2025-05-07 ENCOUNTER — HOSPITAL ENCOUNTER (OUTPATIENT)
Dept: INFUSION CENTER | Facility: HOSPITAL | Age: 50
Discharge: HOME | End: 2025-05-07
Attending: FAMILY MEDICINE
Payer: COMMERCIAL

## 2025-05-07 VITALS
DIASTOLIC BLOOD PRESSURE: 84 MMHG | OXYGEN SATURATION: 99 % | TEMPERATURE: 96.2 F | RESPIRATION RATE: 16 BRPM | SYSTOLIC BLOOD PRESSURE: 129 MMHG | HEART RATE: 106 BPM

## 2025-05-07 DIAGNOSIS — E61.1 IRON DEFICIENCY: ICD-10-CM

## 2025-05-07 DIAGNOSIS — R19.7 VOMITING AND DIARRHEA: ICD-10-CM

## 2025-05-07 DIAGNOSIS — D50.0 IRON DEFICIENCY ANEMIA DUE TO CHRONIC BLOOD LOSS: Primary | ICD-10-CM

## 2025-05-07 DIAGNOSIS — R63.0 ANOREXIA: ICD-10-CM

## 2025-05-07 DIAGNOSIS — E44.1 MILD PROTEIN-CALORIE MALNUTRITION (HCC): ICD-10-CM

## 2025-05-07 DIAGNOSIS — R11.10 VOMITING AND DIARRHEA: ICD-10-CM

## 2025-05-07 PROCEDURE — 96360 HYDRATION IV INFUSION INIT: CPT

## 2025-05-07 PROCEDURE — 96361 HYDRATE IV INFUSION ADD-ON: CPT

## 2025-05-07 RX ORDER — METHYLPREDNISOLONE SODIUM SUCCINATE 40 MG/ML
40 INJECTION, POWDER, LYOPHILIZED, FOR SOLUTION INTRAMUSCULAR; INTRAVENOUS ONCE
Status: CANCELLED
Start: 2025-05-12 | End: 2025-05-08

## 2025-05-07 RX ADMIN — SODIUM CHLORIDE 1000 ML: 0.9 INJECTION, SOLUTION INTRAVENOUS at 10:24

## 2025-05-07 NOTE — PROGRESS NOTES
Celine Roa tolerated IV hydration well with no complications.      Celine Roa is aware of future appt on 8/9/24 at 1000.     AVS declined.      Wound Care Center Physician Orders and Discharge Instructions  [facility]  [address], [address]  [place] [id number]  Telephone: [phone]      Fax: [phone]        Your home care company:   None .     Your wound-care supplies will be provided by:  We are ordering your velcro compression garments from [employer]. Please note, depending on your insurance coverage, you may have out-of-pocket expenses for these supplies. Someone from [employer] should call you to confirm your order and discuss those potential costs before they ship your products -- please anticipate that call. If your out-of-pocket cost could be substantial, [employer] has a financial hardship program for patients who qualify, so please ask about that if you might need a hand. If you have any questions about your supplies or your potential out-of-pocket costs, or if you need to place an order for a refill of supplies (typically monthly), please call [phone].        NAME:  Lacho Gallegos   YOB: 1948  PRIMARY DIAGNOSIS FOR WOUND CARE CENTER:  Diabetic Ulcer .     Wound cleansing:   Do not scrub or use excessive force.  Wash hands with soap and water before and after dressing changes.  Prior to applying a clean dressing, cleanse wound with normal saline, wound cleanser, or mild soap and water. Ask your physician or nurse before getting the wound(s) wet in the shower.                Wound care for home:     Right Lateral Ankle  Aquaphor/Triamcinolone to dry skin  Nexodyn spray  PSO to wound  2 layers of foam to lateral ankle  1 foam to anterior ankle  CoFlex TLC Calamine compression wrap   Leave in place for the week - Do NOT get wet!        Your physician has ordered Compression for treatment of venous ulcers, venous insufficiency,and/or Lymphedema.   * Do not remove until your next scheduled visit in [facility]- do not get wet.    * If your wrap falls down, becomes painful or you have decreased

## 2025-05-07 NOTE — PROGRESS NOTES
Celine Roa  tolerated treatment well with no complications.      Celine Roa is aware of future appt on 5/12/25 at 1130.     AVS printed and given to Celine Roa:   No (Declined by Celine Roa)

## 2025-05-09 ENCOUNTER — TELEPHONE (OUTPATIENT)
Age: 50
End: 2025-05-09

## 2025-05-09 NOTE — TELEPHONE ENCOUNTER
"Hello,    The following message was sent via e-mail to the leadership team:     Please advise if you can help facilitate the following overbook request:    Patient Name: Celine Roa     Patient MRN: [id number]    Call back #: [phone]    Insurance: [facility]     Department:Cardiology    Speciality: General Cardiology    Reason for overbook request: PATIENT REQUEST    Comments (Write \"N/a\" if no comments): Patient is requesting a sooner appointment with Dr. García. She was due for her annual follow up around 3/20/25. Patient is willing to see Dr. García in any of his locations. Patient does not want to schedule with another provider due to having a rare medical condition that Dr. García is aware of. Patient is upset because she was told his schedule was not out at her last appointment and is now scheduled for over a year after she is due.      Requested doctor and location: Dr. García     Date of current appointment: 6/3/26      Thank you.      "

## 2025-05-09 NOTE — ED NOTES
Pt requesting pain medication for back pain and chest pain  Dr Betsy Garza made aware  No new orders received        Homer Christy RN  02/12/19 0908 You had a small piece of food adhered to the back of your tongue that was removed with a tongue depressor and suction. Follow up with your regular medical doctor. Return for worsening / concerning symptoms.     Esophageal Foreign Body    WHAT YOU NEED TO KNOW:    What is an esophageal foreign body? An esophageal foreign body is an object you swallowed that got stuck in your esophagus (throat). Examples include dental work and button batteries. A piece of food or a fish bone can also become stuck in your esophagus. Your risk increases if you wear dentures, have trouble swallowing, or have a narrow esophagus. You also have a higher risk if you eat fish that contains small bones.  Digestive Tract    What are the signs and symptoms of an esophageal foreign body?    Pain when you swallow, trouble swallowing, or a sore throat    Drooling or vomiting    Choking or gagging    Chest pain, abdominal pain, or a feeling that something is in your throat    A cough or noisy breathing  How may an esophageal foreign body be found? Your healthcare provider will examine your throat, chest, and abdomen. Tell your provider the type of object you swallowed and when you swallowed it. Your provider may use any of the following to find the object:    A barium swallow or other x-rays may be used to check your neck, chest, and abdomen. You will drink thick liquid called barium while healthcare providers take x-rays. Barium helps your esophagus and stomach show up on x-rays.    Laryngoscopy is used to examine the back of your throat. A laryngoscope is a thin, flexible tube that is inserted through your mouth to the back of your throat. Your provider may also use a light and a mirror.    A metal detector may be used to look for coins or other metal objects in your body.    A CT scan or x-rays may be used to check for objects in your esophagus or stomach. You may be given contrast liquid to help your esophagus and stomach show up better. Tell the provider if you have ever had an allergic reaction to contrast liquid.    Endoscopy may be used to see the lower part check your esophagus. A scope is a long, bendable tube with a light on the end of it. A camera attached to the scope will take pictures. The object may be removed during the endoscopy.  Upper Endoscopy  How may an esophageal foreign body be removed? Your healthcare provider may choose to observe you for 24 hours or longer. Most objects pass through the digestive system on their own within 7 to 10 days. Objects that are small or smooth will often pass without a problem. You will need to search for the object every time you have a bowel movement. Do not use laxatives or stool softeners. Do not force yourself to vomit. Your provider may try to remove the object with any of the following:    Forceps may be used to grab an object if your provider can see it in the back of your throat. Forceps also may be used to remove an object during endoscopy.    Bougienage is a procedure used to push the object into your stomach. Your provider will insert a thin tube into your esophagus to widen it. This may be done if the object is smooth and likely to pass through your digestive system normally.    A balloon catheter may be used to pull the object out of your esophagus. The catheter is a thin tube with a deflated balloon at the end. Your provider will insert the balloon catheter into your mouth or nose until it goes past the object. The balloon will then be inflated. This procedure may be done if the object is smooth or blunt.    Surgery may be needed if the object cannot be removed another way.  Call your local emergency number (911 in the [place]) or have someone call if:    You have chest or abdominal pain, or shortness of breath.    You are choking.  When should I seek immediate care?    You have a fever.    You have more pain when you swallow.    You cannot stop vomiting.    Your vomit is bloody.    Your bowel movements are black or bloody.  When should I call my doctor?    You do not find the object in your bowel movement within 3 days.    You have questions or concerns about your condition or care.  CARE AGREEMENT:    You have the right to help plan your care. Learn about your health condition and how it may be treated. Discuss treatment options with your healthcare providers to decide what care you want to receive. You always have the right to refuse treatment.

## 2025-05-12 ENCOUNTER — HOSPITAL ENCOUNTER (OUTPATIENT)
Dept: RADIOLOGY | Facility: HOSPITAL | Age: 50
Discharge: HOME | End: 2025-05-12
Payer: COMMERCIAL

## 2025-05-12 ENCOUNTER — HOSPITAL ENCOUNTER (OUTPATIENT)
Dept: INFUSION CENTER | Facility: HOSPITAL | Age: 50
Discharge: HOME | End: 2025-05-12
Attending: FAMILY MEDICINE
Payer: COMMERCIAL

## 2025-05-12 VITALS
TEMPERATURE: 97 F | SYSTOLIC BLOOD PRESSURE: 130 MMHG | HEART RATE: 93 BPM | DIASTOLIC BLOOD PRESSURE: 86 MMHG | OXYGEN SATURATION: 98 % | RESPIRATION RATE: 16 BRPM

## 2025-05-12 DIAGNOSIS — E44.1 MILD PROTEIN-CALORIE MALNUTRITION (HCC): ICD-10-CM

## 2025-05-12 DIAGNOSIS — R63.0 ANOREXIA: ICD-10-CM

## 2025-05-12 DIAGNOSIS — S91.332A PUNCTURE WOUND OF LEFT FOOT, INITIAL ENCOUNTER: ICD-10-CM

## 2025-05-12 DIAGNOSIS — R11.10 VOMITING AND DIARRHEA: ICD-10-CM

## 2025-05-12 DIAGNOSIS — D50.0 IRON DEFICIENCY ANEMIA DUE TO CHRONIC BLOOD LOSS: Primary | ICD-10-CM

## 2025-05-12 DIAGNOSIS — E61.1 IRON DEFICIENCY: ICD-10-CM

## 2025-05-12 DIAGNOSIS — R19.7 VOMITING AND DIARRHEA: ICD-10-CM

## 2025-05-12 PROCEDURE — 96360 HYDRATION IV INFUSION INIT: CPT

## 2025-05-12 PROCEDURE — 73630 X-RAY EXAM OF FOOT: CPT

## 2025-05-12 PROCEDURE — 96361 HYDRATE IV INFUSION ADD-ON: CPT

## 2025-05-12 RX ORDER — METHYLPREDNISOLONE SODIUM SUCCINATE 40 MG/ML
40 INJECTION, POWDER, LYOPHILIZED, FOR SOLUTION INTRAMUSCULAR; INTRAVENOUS ONCE
Status: CANCELLED
Start: 2025-05-13 | End: 2025-05-13

## 2025-05-12 RX ADMIN — SODIUM CHLORIDE 1000 ML: 0.9 INJECTION, SOLUTION INTRAVENOUS at 12:02

## 2025-05-12 NOTE — PROGRESS NOTES
Celine Roa  tolerated treatment well with no complications.      Celine Roa is aware of future appt on 5/15/25 at 0830.     AVS printed and given to Celine Roa:  No (Declined by Celine Roa)

## 2025-05-15 ENCOUNTER — HOSPITAL ENCOUNTER (OUTPATIENT)
Dept: INFUSION CENTER | Facility: HOSPITAL | Age: 50
Discharge: HOME | End: 2025-05-15
Attending: FAMILY MEDICINE
Payer: COMMERCIAL

## 2025-05-15 VITALS
RESPIRATION RATE: 16 BRPM | TEMPERATURE: 96.3 F | OXYGEN SATURATION: 98 % | SYSTOLIC BLOOD PRESSURE: 125 MMHG | DIASTOLIC BLOOD PRESSURE: 88 MMHG | HEART RATE: 90 BPM

## 2025-05-15 DIAGNOSIS — R11.10 VOMITING AND DIARRHEA: ICD-10-CM

## 2025-05-15 DIAGNOSIS — D50.0 IRON DEFICIENCY ANEMIA DUE TO CHRONIC BLOOD LOSS: Primary | ICD-10-CM

## 2025-05-15 DIAGNOSIS — R19.7 VOMITING AND DIARRHEA: ICD-10-CM

## 2025-05-15 DIAGNOSIS — R63.0 ANOREXIA: ICD-10-CM

## 2025-05-15 DIAGNOSIS — E44.1 MILD PROTEIN-CALORIE MALNUTRITION (HCC): ICD-10-CM

## 2025-05-15 DIAGNOSIS — E61.1 IRON DEFICIENCY: ICD-10-CM

## 2025-05-15 RX ORDER — METHYLPREDNISOLONE SODIUM SUCCINATE 40 MG/ML
40 INJECTION, POWDER, LYOPHILIZED, FOR SOLUTION INTRAMUSCULAR; INTRAVENOUS ONCE
Start: 2025-05-16 | End: 2025-05-16

## 2025-05-15 RX ADMIN — SODIUM CHLORIDE 1000 ML: 0.9 INJECTION, SOLUTION INTRAVENOUS at 08:43

## 2025-05-15 NOTE — PLAN OF CARE
Problem: Potential for Falls  Goal: Patient will remain free of falls  Description: INTERVENTIONS:  - Educate patient/family on patient safety including physical limitations  - Instruct patient to call for assistance with activity   - Consider consulting OT/PT to assist with strengthening/mobility based on AM PAC & JH-HLM score    Outcome: Progressing     Problem: Knowledge Deficit  Goal: Patient/family/caregiver demonstrates understanding of disease process, treatment plan, medications, and discharge instructions  Description: Complete learning assessment and assess knowledge base.  Interventions:  - Provide teaching at level of understanding  - Provide teaching via preferred learning methods  Outcome: Progressing

## 2025-05-15 NOTE — PROGRESS NOTES
Celine Roa  tolerated hydration treatment well with no complications.      Celine Roa is aware of future appt on 5/29/25 at 1300.     AVS printed and given to Celine Roa:   No (Declined by Celine Roa)

## 2025-05-29 ENCOUNTER — HOSPITAL ENCOUNTER (OUTPATIENT)
Dept: INFUSION CENTER | Facility: HOSPITAL | Age: 50
Discharge: HOME | End: 2025-05-29
Attending: FAMILY MEDICINE
Payer: COMMERCIAL

## 2025-05-29 VITALS
OXYGEN SATURATION: 99 % | SYSTOLIC BLOOD PRESSURE: 124 MMHG | RESPIRATION RATE: 16 BRPM | HEART RATE: 97 BPM | DIASTOLIC BLOOD PRESSURE: 82 MMHG | TEMPERATURE: 97.2 F

## 2025-05-29 DIAGNOSIS — R63.0 ANOREXIA: ICD-10-CM

## 2025-05-29 DIAGNOSIS — L50.1 CHRONIC IDIOPATHIC URTICARIA: Primary | ICD-10-CM

## 2025-05-29 DIAGNOSIS — E44.1 MILD PROTEIN-CALORIE MALNUTRITION (HCC): ICD-10-CM

## 2025-05-29 DIAGNOSIS — R19.7 VOMITING AND DIARRHEA: ICD-10-CM

## 2025-05-29 DIAGNOSIS — E61.1 IRON DEFICIENCY: ICD-10-CM

## 2025-05-29 DIAGNOSIS — R11.10 VOMITING AND DIARRHEA: ICD-10-CM

## 2025-05-29 DIAGNOSIS — D50.0 IRON DEFICIENCY ANEMIA DUE TO CHRONIC BLOOD LOSS: ICD-10-CM

## 2025-05-29 PROCEDURE — 96361 HYDRATE IV INFUSION ADD-ON: CPT

## 2025-05-29 PROCEDURE — 96375 TX/PRO/DX INJ NEW DRUG ADDON: CPT

## 2025-05-29 PROCEDURE — 96365 THER/PROPH/DIAG IV INF INIT: CPT

## 2025-05-29 PROCEDURE — 96372 THER/PROPH/DIAG INJ SC/IM: CPT

## 2025-05-29 RX ORDER — EPINEPHRINE 1 MG/ML
0.3 INJECTION, SOLUTION, CONCENTRATE INTRAVENOUS
OUTPATIENT
Start: 2025-06-23

## 2025-05-29 RX ORDER — METHYLPREDNISOLONE SODIUM SUCCINATE 40 MG/ML
40 INJECTION, POWDER, LYOPHILIZED, FOR SOLUTION INTRAMUSCULAR; INTRAVENOUS ONCE
Status: COMPLETED | OUTPATIENT
Start: 2025-05-29 | End: 2025-05-29

## 2025-05-29 RX ORDER — METHYLPREDNISOLONE SODIUM SUCCINATE 40 MG/ML
40 INJECTION, POWDER, LYOPHILIZED, FOR SOLUTION INTRAMUSCULAR; INTRAVENOUS ONCE
Status: CANCELLED
Start: 2025-06-09 | End: 2025-05-30

## 2025-05-29 RX ORDER — EPINEPHRINE 1 MG/ML
0.3 INJECTION, SOLUTION, CONCENTRATE INTRAVENOUS
Status: DISCONTINUED | OUTPATIENT
Start: 2025-05-29 | End: 2025-06-01 | Stop reason: HOSPADM

## 2025-05-29 RX ADMIN — FAMOTIDINE 40 MG: 10 INJECTION, SOLUTION INTRAVENOUS at 14:04

## 2025-05-29 RX ADMIN — OMALIZUMAB 300 MG: 150 INJECTION, SOLUTION SUBCUTANEOUS at 14:34

## 2025-05-29 RX ADMIN — DIPHENHYDRAMINE HYDROCHLORIDE 50 MG: 50 INJECTION, SOLUTION INTRAMUSCULAR; INTRAVENOUS at 13:39

## 2025-05-29 RX ADMIN — METHYLPREDNISOLONE SODIUM SUCCINATE 40 MG: 40 INJECTION, POWDER, FOR SOLUTION INTRAMUSCULAR; INTRAVENOUS at 13:36

## 2025-05-29 RX ADMIN — SODIUM CHLORIDE 1000 ML: 0.9 INJECTION, SOLUTION INTRAVENOUS at 13:25

## 2025-05-29 NOTE — PROGRESS NOTES
Celine Roa  tolerated hydration/premeds/Xolair injection treatment well with no complications.  EpiPenn expires 2/2026. 30 minute observation complete with no issues.     Celine Roa is aware of future appt on 6/4/25 at 1130am at [place].     AVS printed and given to Celine Roa:  No (Declined by Celine Roa)

## 2025-06-04 ENCOUNTER — HOSPITAL ENCOUNTER (OUTPATIENT)
Dept: INFUSION CENTER | Facility: HOSPITAL | Age: 50
Discharge: HOME | End: 2025-06-04
Attending: FAMILY MEDICINE

## 2025-06-09 ENCOUNTER — HOSPITAL ENCOUNTER (OUTPATIENT)
Dept: INFUSION CENTER | Facility: HOSPITAL | Age: 50
Discharge: HOME | End: 2025-06-09
Payer: COMMERCIAL

## 2025-06-09 VITALS
OXYGEN SATURATION: 98 % | RESPIRATION RATE: 16 BRPM | DIASTOLIC BLOOD PRESSURE: 84 MMHG | TEMPERATURE: 97.8 F | HEART RATE: 93 BPM | SYSTOLIC BLOOD PRESSURE: 116 MMHG

## 2025-06-09 DIAGNOSIS — E61.1 IRON DEFICIENCY: ICD-10-CM

## 2025-06-09 DIAGNOSIS — D50.0 IRON DEFICIENCY ANEMIA DUE TO CHRONIC BLOOD LOSS: ICD-10-CM

## 2025-06-09 DIAGNOSIS — R19.7 VOMITING AND DIARRHEA: Primary | ICD-10-CM

## 2025-06-09 DIAGNOSIS — R11.10 VOMITING AND DIARRHEA: Primary | ICD-10-CM

## 2025-06-09 DIAGNOSIS — R63.0 ANOREXIA: ICD-10-CM

## 2025-06-09 DIAGNOSIS — E44.1 MILD PROTEIN-CALORIE MALNUTRITION (HCC): ICD-10-CM

## 2025-06-09 PROCEDURE — 96361 HYDRATE IV INFUSION ADD-ON: CPT

## 2025-06-09 PROCEDURE — 96365 THER/PROPH/DIAG IV INF INIT: CPT

## 2025-06-09 PROCEDURE — 96375 TX/PRO/DX INJ NEW DRUG ADDON: CPT

## 2025-06-09 RX ORDER — METHYLPREDNISOLONE SODIUM SUCCINATE 40 MG/ML
40 INJECTION, POWDER, LYOPHILIZED, FOR SOLUTION INTRAMUSCULAR; INTRAVENOUS ONCE
Status: COMPLETED | OUTPATIENT
Start: 2025-06-09 | End: 2025-06-09

## 2025-06-09 RX ORDER — METHYLPREDNISOLONE SODIUM SUCCINATE 40 MG/ML
40 INJECTION, POWDER, LYOPHILIZED, FOR SOLUTION INTRAMUSCULAR; INTRAVENOUS ONCE
Status: CANCELLED
Start: 2025-06-20 | End: 2025-06-10

## 2025-06-09 RX ADMIN — DIPHENHYDRAMINE HYDROCHLORIDE 50 MG: 50 INJECTION, SOLUTION INTRAMUSCULAR; INTRAVENOUS at 14:22

## 2025-06-09 RX ADMIN — SODIUM CHLORIDE 1500 ML: 0.9 INJECTION, SOLUTION INTRAVENOUS at 14:00

## 2025-06-09 RX ADMIN — METHYLPREDNISOLONE SODIUM SUCCINATE 40 MG: 40 INJECTION, POWDER, FOR SOLUTION INTRAMUSCULAR; INTRAVENOUS at 15:30

## 2025-06-09 RX ADMIN — FAMOTIDINE 40 MG: 10 INJECTION, SOLUTION INTRAVENOUS at 14:47

## 2025-06-09 NOTE — PLAN OF CARE
Problem: Potential for Falls  Goal: Patient will remain free of falls  Description: INTERVENTIONS:  - Educate patient/family on patient safety including physical limitations  - Instruct patient to call for assistance with activity   - Consider consulting OT/PT to assist with strengthening/mobility based on AM PAC & JH-HLM score  - Consult OT/PT to assist with strengthening/mobility   - Keep Call bell within reach  - Keep bed low and locked with side rails adjusted as appropriate  - Keep care items and personal belongings within reach  - Initiate and maintain comfort rounds  - Consider moving patient to room near nurses station  Outcome: Progressing     Problem: INFECTION - ADULT  Goal: Absence or prevention of progression during hospitalization  Description: INTERVENTIONS:  - Assess and monitor for signs and symptoms of infection  - Monitor lab/diagnostic results  - Monitor all insertion sites, i.e. indwelling lines, tubes, and drains  - Monitor endotracheal if appropriate and nasal secretions for changes in amount and color  - [place] appropriate cooling/warming therapies per order  - Administer medications as ordered  - Instruct and encourage patient and family to use good hand hygiene technique  - Identify and instruct in appropriate isolation precautions for identified infection/condition  Outcome: Progressing     Problem: Knowledge Deficit  Goal: Patient/family/caregiver demonstrates understanding of disease process, treatment plan, medications, and discharge instructions  Description: Complete learning assessment and assess knowledge base.  Interventions:  - Provide teaching at level of understanding  - Provide teaching via preferred learning methods  Outcome: Progressing

## 2025-06-09 NOTE — PROGRESS NOTES
Celine Roa tolerated hydration treatment well with no complications.     Celine Roa is aware of future appt on 6/20/25 at 0800     AVS declined

## 2025-06-19 ENCOUNTER — TELEPHONE (OUTPATIENT)
Dept: FAMILY MEDICINE CLINIC | Facility: CLINIC | Age: 50
End: 2025-06-19

## 2025-06-20 ENCOUNTER — HOSPITAL ENCOUNTER (OUTPATIENT)
Dept: INFUSION CENTER | Facility: HOSPITAL | Age: 50
End: 2025-06-20
Attending: FAMILY MEDICINE
Payer: COMMERCIAL

## 2025-06-20 VITALS
HEART RATE: 98 BPM | TEMPERATURE: 96.5 F | DIASTOLIC BLOOD PRESSURE: 78 MMHG | SYSTOLIC BLOOD PRESSURE: 119 MMHG | RESPIRATION RATE: 16 BRPM | OXYGEN SATURATION: 98 %

## 2025-06-20 DIAGNOSIS — R63.0 ANOREXIA: ICD-10-CM

## 2025-06-20 DIAGNOSIS — E44.1 MILD PROTEIN-CALORIE MALNUTRITION (HCC): ICD-10-CM

## 2025-06-20 DIAGNOSIS — E61.1 IRON DEFICIENCY: ICD-10-CM

## 2025-06-20 DIAGNOSIS — R11.10 VOMITING AND DIARRHEA: ICD-10-CM

## 2025-06-20 DIAGNOSIS — D50.0 IRON DEFICIENCY ANEMIA DUE TO CHRONIC BLOOD LOSS: Primary | ICD-10-CM

## 2025-06-20 DIAGNOSIS — R19.7 VOMITING AND DIARRHEA: ICD-10-CM

## 2025-06-20 PROCEDURE — 96361 HYDRATE IV INFUSION ADD-ON: CPT

## 2025-06-20 PROCEDURE — 96365 THER/PROPH/DIAG IV INF INIT: CPT

## 2025-06-20 PROCEDURE — 96367 TX/PROPH/DG ADDL SEQ IV INF: CPT

## 2025-06-20 PROCEDURE — 96375 TX/PRO/DX INJ NEW DRUG ADDON: CPT

## 2025-06-20 RX ORDER — METHYLPREDNISOLONE SODIUM SUCCINATE 40 MG/ML
40 INJECTION, POWDER, LYOPHILIZED, FOR SOLUTION INTRAMUSCULAR; INTRAVENOUS ONCE
Status: CANCELLED
Start: 2025-06-27 | End: 2025-06-21

## 2025-06-20 RX ORDER — METHYLPREDNISOLONE SODIUM SUCCINATE 40 MG/ML
40 INJECTION, POWDER, LYOPHILIZED, FOR SOLUTION INTRAMUSCULAR; INTRAVENOUS ONCE
Status: COMPLETED | OUTPATIENT
Start: 2025-06-20 | End: 2025-06-20

## 2025-06-20 RX ADMIN — METHYLPREDNISOLONE SODIUM SUCCINATE 40 MG: 40 INJECTION, POWDER, FOR SOLUTION INTRAMUSCULAR; INTRAVENOUS at 09:40

## 2025-06-20 RX ADMIN — SODIUM CHLORIDE 1000 ML: 0.9 INJECTION, SOLUTION INTRAVENOUS at 08:22

## 2025-06-20 RX ADMIN — DIPHENHYDRAMINE HYDROCHLORIDE 50 MG: 50 INJECTION, SOLUTION INTRAMUSCULAR; INTRAVENOUS at 08:32

## 2025-06-20 RX ADMIN — FAMOTIDINE 40 MG: 10 INJECTION, SOLUTION INTRAVENOUS at 08:54

## 2025-06-20 NOTE — PROGRESS NOTES
Celine Roa  tolerated Hydration/premeds treatment well with no complications.      Celine Roa is aware of future appt on 6/27/25 at 1230.     AVS printed and given to Celine Roa:  No (Declined by Celine Roa)

## 2025-06-27 ENCOUNTER — HOSPITAL ENCOUNTER (OUTPATIENT)
Dept: INFUSION CENTER | Facility: HOSPITAL | Age: 50
End: 2025-06-27
Attending: FAMILY MEDICINE
Payer: COMMERCIAL

## 2025-06-27 ENCOUNTER — TELEPHONE (OUTPATIENT)
Dept: FAMILY MEDICINE CLINIC | Facility: CLINIC | Age: 50
End: 2025-06-27

## 2025-06-27 VITALS
OXYGEN SATURATION: 97 % | RESPIRATION RATE: 16 BRPM | HEART RATE: 98 BPM | TEMPERATURE: 96.9 F | DIASTOLIC BLOOD PRESSURE: 74 MMHG | SYSTOLIC BLOOD PRESSURE: 131 MMHG

## 2025-06-27 DIAGNOSIS — D50.0 IRON DEFICIENCY ANEMIA DUE TO CHRONIC BLOOD LOSS: ICD-10-CM

## 2025-06-27 DIAGNOSIS — R19.7 VOMITING AND DIARRHEA: ICD-10-CM

## 2025-06-27 DIAGNOSIS — L50.1 CHRONIC IDIOPATHIC URTICARIA: Primary | ICD-10-CM

## 2025-06-27 DIAGNOSIS — R11.10 VOMITING AND DIARRHEA: ICD-10-CM

## 2025-06-27 DIAGNOSIS — E44.1 MILD PROTEIN-CALORIE MALNUTRITION (HCC): ICD-10-CM

## 2025-06-27 DIAGNOSIS — E61.1 IRON DEFICIENCY: ICD-10-CM

## 2025-06-27 DIAGNOSIS — R63.0 ANOREXIA: ICD-10-CM

## 2025-06-27 PROCEDURE — 96365 THER/PROPH/DIAG IV INF INIT: CPT

## 2025-06-27 PROCEDURE — 96375 TX/PRO/DX INJ NEW DRUG ADDON: CPT

## 2025-06-27 PROCEDURE — 96361 HYDRATE IV INFUSION ADD-ON: CPT

## 2025-06-27 PROCEDURE — 96372 THER/PROPH/DIAG INJ SC/IM: CPT

## 2025-06-27 RX ORDER — METHYLPREDNISOLONE SODIUM SUCCINATE 40 MG/ML
40 INJECTION, POWDER, LYOPHILIZED, FOR SOLUTION INTRAMUSCULAR; INTRAVENOUS ONCE
Start: 2025-06-28 | End: 2025-06-28

## 2025-06-27 RX ORDER — EPINEPHRINE 1 MG/ML
0.3 INJECTION, SOLUTION, CONCENTRATE INTRAVENOUS
OUTPATIENT
Start: 2025-07-24

## 2025-06-27 RX ORDER — EPINEPHRINE 1 MG/ML
0.3 INJECTION, SOLUTION, CONCENTRATE INTRAVENOUS
Status: ACTIVE | OUTPATIENT
Start: 2025-06-27

## 2025-06-27 RX ORDER — METHYLPREDNISOLONE SODIUM SUCCINATE 40 MG/ML
40 INJECTION, POWDER, LYOPHILIZED, FOR SOLUTION INTRAMUSCULAR; INTRAVENOUS ONCE
Status: COMPLETED | OUTPATIENT
Start: 2025-06-27 | End: 2025-06-27

## 2025-06-27 RX ADMIN — OMALIZUMAB 300 MG: 150 INJECTION, SOLUTION SUBCUTANEOUS at 14:20

## 2025-06-27 RX ADMIN — FAMOTIDINE 40 MG: 10 INJECTION, SOLUTION INTRAVENOUS at 13:26

## 2025-06-27 RX ADMIN — METHYLPREDNISOLONE SODIUM SUCCINATE 40 MG: 40 INJECTION, POWDER, FOR SOLUTION INTRAMUSCULAR; INTRAVENOUS at 14:16

## 2025-06-27 RX ADMIN — DIPHENHYDRAMINE HYDROCHLORIDE 50 MG: 50 INJECTION, SOLUTION INTRAMUSCULAR; INTRAVENOUS at 13:01

## 2025-06-27 RX ADMIN — SODIUM CHLORIDE 1000 ML: 0.9 INJECTION, SOLUTION INTRAVENOUS at 12:53

## 2025-06-27 NOTE — PROGRESS NOTES
Celine Roa tolerated hydration treatment and Xolair injections well with no complications.      Celine Roa is aware of future appt on 7/15/25 at 1330.     AVS declined, patient uses MyChart.

## 2025-06-27 NOTE — TELEPHONE ENCOUNTER
hello good afternoon this is nhi from [facility] pre authorization department i'm calling regarding patient alyssa brunner that date of birth is [birth year] MRN is [id number] i'm calling to see if authorization has been started pre authorization has been started for an injection that she's supposed to be receiving on the 27th for the medication it's XOL as in [place] as in [place] the J code for that is  we just need to know if that authorization has been started being that it does need authorization if you could please give me a call back with that information at [phone] that is [phone] and again my name is nhi thank you

## 2025-06-30 ENCOUNTER — DOCUMENTATION (OUTPATIENT)
Dept: FAMILY MEDICINE CLINIC | Facility: CLINIC | Age: 50
End: 2025-06-30

## 2025-07-15 ENCOUNTER — HOSPITAL ENCOUNTER (OUTPATIENT)
Dept: INFUSION CENTER | Facility: HOSPITAL | Age: 50
End: 2025-07-15
Attending: FAMILY MEDICINE

## 2025-07-22 ENCOUNTER — HOSPITAL ENCOUNTER (OUTPATIENT)
Dept: INFUSION CENTER | Facility: HOSPITAL | Age: 50
Discharge: HOME | End: 2025-07-22
Attending: FAMILY MEDICINE
Payer: COMMERCIAL

## 2025-07-22 VITALS
HEART RATE: 104 BPM | DIASTOLIC BLOOD PRESSURE: 83 MMHG | SYSTOLIC BLOOD PRESSURE: 124 MMHG | RESPIRATION RATE: 16 BRPM | OXYGEN SATURATION: 99 % | TEMPERATURE: 97.2 F

## 2025-07-22 DIAGNOSIS — E44.1 MILD PROTEIN-CALORIE MALNUTRITION (HCC): ICD-10-CM

## 2025-07-22 DIAGNOSIS — R11.10 VOMITING AND DIARRHEA: ICD-10-CM

## 2025-07-22 DIAGNOSIS — R63.0 ANOREXIA: ICD-10-CM

## 2025-07-22 DIAGNOSIS — R19.7 VOMITING AND DIARRHEA: ICD-10-CM

## 2025-07-22 DIAGNOSIS — L50.1 CHRONIC IDIOPATHIC URTICARIA: ICD-10-CM

## 2025-07-22 DIAGNOSIS — D50.0 IRON DEFICIENCY ANEMIA DUE TO CHRONIC BLOOD LOSS: Primary | ICD-10-CM

## 2025-07-22 DIAGNOSIS — E61.1 IRON DEFICIENCY: ICD-10-CM

## 2025-07-22 PROCEDURE — 96361 HYDRATE IV INFUSION ADD-ON: CPT

## 2025-07-22 PROCEDURE — 96372 THER/PROPH/DIAG INJ SC/IM: CPT

## 2025-07-22 PROCEDURE — 96365 THER/PROPH/DIAG IV INF INIT: CPT

## 2025-07-22 PROCEDURE — 96375 TX/PRO/DX INJ NEW DRUG ADDON: CPT

## 2025-07-22 RX ORDER — EPINEPHRINE 1 MG/ML
0.3 INJECTION, SOLUTION, CONCENTRATE INTRAVENOUS
Status: DISCONTINUED | OUTPATIENT
Start: 2025-07-22 | End: 2025-07-25 | Stop reason: HOSPADM

## 2025-07-22 RX ORDER — EPINEPHRINE 1 MG/ML
0.3 INJECTION, SOLUTION, CONCENTRATE INTRAVENOUS
OUTPATIENT
Start: 2025-07-25

## 2025-07-22 RX ORDER — METHYLPREDNISOLONE SODIUM SUCCINATE 40 MG/ML
40 INJECTION, POWDER, LYOPHILIZED, FOR SOLUTION INTRAMUSCULAR; INTRAVENOUS ONCE
Status: COMPLETED | OUTPATIENT
Start: 2025-07-22 | End: 2025-07-22

## 2025-07-22 RX ORDER — METHYLPREDNISOLONE SODIUM SUCCINATE 40 MG/ML
40 INJECTION, POWDER, LYOPHILIZED, FOR SOLUTION INTRAMUSCULAR; INTRAVENOUS ONCE
Start: 2025-07-22 | End: 2025-07-23

## 2025-07-22 RX ADMIN — FAMOTIDINE 40 MG: 10 INJECTION, SOLUTION INTRAVENOUS at 10:09

## 2025-07-22 RX ADMIN — SODIUM CHLORIDE 1500 ML: 0.9 INJECTION, SOLUTION INTRAVENOUS at 08:50

## 2025-07-22 RX ADMIN — OMALIZUMAB 300 MG: 150 INJECTION, SOLUTION SUBCUTANEOUS at 10:55

## 2025-07-22 RX ADMIN — METHYLPREDNISOLONE SODIUM SUCCINATE 40 MG: 40 INJECTION, POWDER, FOR SOLUTION INTRAMUSCULAR; INTRAVENOUS at 10:42

## 2025-07-22 RX ADMIN — DIPHENHYDRAMINE HYDROCHLORIDE 50 MG: 50 INJECTION, SOLUTION INTRAMUSCULAR; INTRAVENOUS at 09:35

## 2025-07-22 NOTE — PLAN OF CARE
Problem: SAFETY ADULT  Goal: Patient will remain free of falls  Description: INTERVENTIONS:  - Educate patient/family on patient safety including physical limitations  - Instruct patient to call for assistance with activity   - Consider consulting OT/PT to assist with strengthening/mobility based on AM PAC & JH-HLM score  - Consult OT/PT to assist with strengthening/mobility   - Keep Call bell within reach  - Keep bed low and locked with side rails adjusted as appropriate  - Keep care items and personal belongings within reach  - Initiate and maintain comfort rounds  - Make Fall Risk Sign visible to staff    Outcome: Progressing     Problem: Knowledge Deficit  Goal: Patient/family/caregiver demonstrates understanding of disease process, treatment plan, medications, and discharge instructions  Description: Complete learning assessment and assess knowledge base.  Interventions:  - Provide teaching at level of understanding  - Provide teaching via preferred learning methods  Outcome: Progressing

## 2025-07-22 NOTE — PROGRESS NOTES
Celine Roa  tolerated hydration/premeds treatment well with no complications. Xolair injection given in abdomen with no issues. Epipen exp 2/2026.    Celine Roa is aware of future appt on 8/5/25 at 12pm.     AVS printed and given to Celine Roa:  No (Declined by Celine Roa)

## 2025-08-04 ENCOUNTER — OFFICE VISIT (OUTPATIENT)
Dept: GASTROENTEROLOGY | Facility: CLINIC | Age: 50
End: 2025-08-04
Payer: COMMERCIAL

## 2025-08-04 VITALS
RESPIRATION RATE: 16 BRPM | OXYGEN SATURATION: 98 % | TEMPERATURE: 98.2 F | BODY MASS INDEX: 25.33 KG/M2 | DIASTOLIC BLOOD PRESSURE: 90 MMHG | WEIGHT: 152 LBS | HEART RATE: 89 BPM | SYSTOLIC BLOOD PRESSURE: 127 MMHG | HEIGHT: 65 IN

## 2025-08-04 DIAGNOSIS — K21.9 GASTROESOPHAGEAL REFLUX DISEASE WITHOUT ESOPHAGITIS: ICD-10-CM

## 2025-08-04 DIAGNOSIS — K50.119 CROHN'S DISEASE OF COLON WITH COMPLICATION (HCC): Primary | ICD-10-CM

## 2025-08-04 DIAGNOSIS — K83.8 PNEUMOBILIA: ICD-10-CM

## 2025-08-04 DIAGNOSIS — K83.8 DILATED BILE DUCT: ICD-10-CM

## 2025-08-04 PROCEDURE — 99214 OFFICE O/P EST MOD 30 MIN: CPT | Performed by: PHYSICIAN ASSISTANT

## 2025-08-04 RX ORDER — PREDNISONE 20 MG/1
TABLET ORAL
Qty: 100 TABLET | Refills: 0 | Status: SHIPPED | OUTPATIENT
Start: 2025-08-04

## 2025-08-05 ENCOUNTER — HOSPITAL ENCOUNTER (OUTPATIENT)
Dept: INFUSION CENTER | Facility: HOSPITAL | Age: 50
Discharge: HOME | End: 2025-08-05
Attending: FAMILY MEDICINE
Payer: COMMERCIAL

## 2025-08-11 ENCOUNTER — HOSPITAL ENCOUNTER (OUTPATIENT)
Dept: MRI IMAGING | Facility: HOSPITAL | Age: 50
Discharge: HOME | End: 2025-08-11
Attending: PHYSICIAN ASSISTANT
Payer: COMMERCIAL

## 2025-08-11 ENCOUNTER — HOSPITAL ENCOUNTER (OUTPATIENT)
Dept: INFUSION CENTER | Facility: HOSPITAL | Age: 50
Discharge: HOME | End: 2025-08-11
Attending: FAMILY MEDICINE
Payer: COMMERCIAL

## 2025-08-12 ENCOUNTER — TELEPHONE (OUTPATIENT)
Age: 50
End: 2025-08-12

## 2025-08-12 ENCOUNTER — ANNUAL EXAM (OUTPATIENT)
Dept: GYNECOLOGY | Facility: CLINIC | Age: 50
End: 2025-08-12
Payer: COMMERCIAL

## 2025-08-18 ENCOUNTER — HOSPITAL ENCOUNTER (OUTPATIENT)
Dept: INFUSION CENTER | Facility: HOSPITAL | Age: 50
Discharge: HOME | End: 2025-08-18
Attending: FAMILY MEDICINE
Payer: COMMERCIAL

## 2025-08-18 VITALS
HEART RATE: 72 BPM | DIASTOLIC BLOOD PRESSURE: 81 MMHG | RESPIRATION RATE: 18 BRPM | SYSTOLIC BLOOD PRESSURE: 127 MMHG | TEMPERATURE: 97.1 F | OXYGEN SATURATION: 96 %

## 2025-08-18 DIAGNOSIS — R11.10 VOMITING AND DIARRHEA: ICD-10-CM

## 2025-08-18 DIAGNOSIS — R63.0 ANOREXIA: ICD-10-CM

## 2025-08-18 DIAGNOSIS — R19.7 VOMITING AND DIARRHEA: ICD-10-CM

## 2025-08-18 DIAGNOSIS — E44.1 MILD PROTEIN-CALORIE MALNUTRITION (HCC): ICD-10-CM

## 2025-08-18 DIAGNOSIS — L50.1 CHRONIC IDIOPATHIC URTICARIA: ICD-10-CM

## 2025-08-18 DIAGNOSIS — D50.0 IRON DEFICIENCY ANEMIA DUE TO CHRONIC BLOOD LOSS: Primary | ICD-10-CM

## 2025-08-18 DIAGNOSIS — E61.1 IRON DEFICIENCY: ICD-10-CM

## 2025-08-18 PROCEDURE — 96361 HYDRATE IV INFUSION ADD-ON: CPT

## 2025-08-18 PROCEDURE — 96365 THER/PROPH/DIAG IV INF INIT: CPT

## 2025-08-18 PROCEDURE — 96372 THER/PROPH/DIAG INJ SC/IM: CPT

## 2025-08-18 PROCEDURE — 96375 TX/PRO/DX INJ NEW DRUG ADDON: CPT

## 2025-08-18 RX ORDER — METHYLPREDNISOLONE SODIUM SUCCINATE 40 MG/ML
40 INJECTION, POWDER, LYOPHILIZED, FOR SOLUTION INTRAMUSCULAR; INTRAVENOUS ONCE
Status: CANCELLED
Start: 2025-08-25 | End: 2025-08-19

## 2025-08-18 RX ORDER — METHYLPREDNISOLONE SODIUM SUCCINATE 40 MG/ML
40 INJECTION, POWDER, LYOPHILIZED, FOR SOLUTION INTRAMUSCULAR; INTRAVENOUS ONCE
Status: COMPLETED | OUTPATIENT
Start: 2025-08-18 | End: 2025-08-18

## 2025-08-18 RX ORDER — EPINEPHRINE 1 MG/ML
0.3 INJECTION, SOLUTION, CONCENTRATE INTRAVENOUS
Status: DISCONTINUED | OUTPATIENT
Start: 2025-08-18 | End: 2025-08-21 | Stop reason: HOSPADM

## 2025-08-18 RX ORDER — EPINEPHRINE 1 MG/ML
0.3 INJECTION, SOLUTION, CONCENTRATE INTRAVENOUS
OUTPATIENT
Start: 2025-08-19

## 2025-08-18 RX ADMIN — OMALIZUMAB 300 MG: 150 INJECTION, SOLUTION SUBCUTANEOUS at 10:33

## 2025-08-18 RX ADMIN — FAMOTIDINE 40 MG: 10 INJECTION, SOLUTION INTRAVENOUS at 10:03

## 2025-08-18 RX ADMIN — SODIUM CHLORIDE 1000 ML: 0.9 INJECTION, SOLUTION INTRAVENOUS at 09:32

## 2025-08-18 RX ADMIN — METHYLPREDNISOLONE SODIUM SUCCINATE 40 MG: 40 INJECTION, POWDER, FOR SOLUTION INTRAMUSCULAR; INTRAVENOUS at 10:33

## 2025-08-18 RX ADMIN — DIPHENHYDRAMINE HYDROCHLORIDE 50 MG: 50 INJECTION, SOLUTION INTRAMUSCULAR; INTRAVENOUS at 09:35

## 2025-08-22 ENCOUNTER — RESULTS FOLLOW-UP (OUTPATIENT)
Dept: GASTROENTEROLOGY | Facility: CLINIC | Age: 50
End: 2025-08-22

## (undated) DEVICE — SUT VICRYL 3-0 SH 27 IN J416H

## (undated) DEVICE — IMPERVIOUS STOCKINETTE: Brand: DEROYAL

## (undated) DEVICE — CUFF TOURNIQUET 30 X 4 IN QUICK CONNECT DISP 1BLA

## (undated) DEVICE — INTENDED FOR TISSUE SEPARATION, AND OTHER PROCEDURES THAT REQUIRE A SHARP SURGICAL BLADE TO PUNCTURE OR CUT.: Brand: BARD-PARKER SAFETY BLADES SIZE 15, STERILE

## (undated) DEVICE — 3M™ STERI-STRIP™ REINFORCED ADHESIVE SKIN CLOSURES, R1542, 1/4 IN X 1-1/2 IN (6 MM X 38 MM), 6 STRIPS/ENVELOPE: Brand: 3M™ STERI-STRIP™

## (undated) DEVICE — GLOVE SRG BIOGEL ECLIPSE 7

## (undated) DEVICE — SUT SILK 3-0 SH 30 IN K832H

## (undated) DEVICE — CYSTO TUBING TUR Y IRRIGATION

## (undated) DEVICE — INTENDED FOR TISSUE SEPARATION, AND OTHER PROCEDURES THAT REQUIRE A SHARP SURGICAL BLADE TO PUNCTURE OR CUT.: Brand: BARD-PARKER SAFETY BLADES SIZE 11, STERILE

## (undated) DEVICE — 3000CC GUARDIAN II: Brand: GUARDIAN

## (undated) DEVICE — SYRINGE 50ML LL

## (undated) DEVICE — SCD SEQUENTIAL COMPRESSION COMFORT SLEEVE MEDIUM KNEE LENGTH: Brand: KENDALL SCD

## (undated) DEVICE — ENDOPATH XCEL UNIVERSAL TROCAR STABLILITY SLEEVES: Brand: ENDOPATH XCEL

## (undated) DEVICE — COTTON TIP APPLICTOR 2 PK

## (undated) DEVICE — INTENDED FOR TISSUE SEPARATION, AND OTHER PROCEDURES THAT REQUIRE A SHARP SURGICAL BLADE TO PUNCTURE OR CUT.: Brand: BARD-PARKER SAFETY BLADES SIZE 10, STERILE

## (undated) DEVICE — TOWEL SET X-RAY

## (undated) DEVICE — ADHESIVE SKN CLSR HISTOACRYL FLEX 0.5ML LF

## (undated) DEVICE — STERILE SURGICAL LUBRICANT,  TUBE: Brand: SURGILUBE

## (undated) DEVICE — TUBING SUCTION 5MM X 12 FT

## (undated) DEVICE — CATH URETERAL 5FR X 70 CM FLEX TIP POLYUR BARD

## (undated) DEVICE — CORONARY VENOUS STEROID-ELUTING BIPOLAR PACE/SENSE LEAD: Brand: EASYTRAK® 2

## (undated) DEVICE — SUT VICRYL 0 UR-6 27 IN J603H

## (undated) DEVICE — ACE WRAP 6 IN UNSTERILE

## (undated) DEVICE — CHLORHEXIDINE 4PCT 4 OZ

## (undated) DEVICE — GAUZE SPONGES,USP TYPE VII GAUZE, 12 PLY: Brand: CURITY

## (undated) DEVICE — PACK PBDS LAP CHOLE RF

## (undated) DEVICE — INTENDED FOR TISSUE SEPARATION, AND OTHER PROCEDURES THAT REQUIRE A SHARP SURGICAL BLADE TO PUNCTURE OR CUT.: Brand: BARD-PARKER ® CARBON RIB-BACK BLADES

## (undated) DEVICE — TRANSPOSAL ULTRAFLEX DUO/QUAD ULTRA CART MANIFOLD

## (undated) DEVICE — SYRINGE 10ML LL

## (undated) DEVICE — SUT VICRYL 2-0 REEL 54 IN J286G

## (undated) DEVICE — CHLORAPREP HI-LITE 26ML ORANGE

## (undated) DEVICE — MAT FLOOR STEP DRI 24 X 36 IN N-STRL

## (undated) DEVICE — GLOVE SRG BIOGEL 7

## (undated) DEVICE — ENDOSCOPIC VALVE WITH ADAPTER.: Brand: SURSEAL® II

## (undated) DEVICE — GLOVE INDICATOR PI UNDERGLOVE SZ 8 BLUE

## (undated) DEVICE — NEEDLE 18 G X 1 1/2

## (undated) DEVICE — 3M™ TEGADERM™ TRANSPARENT FILM DRESSING FRAME STYLE, 1624W, 2-3/8 IN X 2-3/4 IN (6 CM X 7 CM), 100/CT 4CT/CASE: Brand: 3M™ TEGADERM™

## (undated) DEVICE — GLOVE INDICATOR PI UNDERGLOVE SZ 7 BLUE

## (undated) DEVICE — SUT SILK 2-0 TIES 144 IN LA55G

## (undated) DEVICE — GLOVE SRG BIOGEL 8

## (undated) DEVICE — GLOVE SRG BIOGEL 6.5

## (undated) DEVICE — PLUMEPEN PRO 10FT

## (undated) DEVICE — PACK TUR

## (undated) DEVICE — 3M™ IOBAN™ 2 ANTIMICROBIAL INCISE DRAPE 6650EZ: Brand: IOBAN™ 2

## (undated) DEVICE — COBAN 4 IN STERILE

## (undated) DEVICE — SUT MONOCRYL 4-0 PS-2 18 IN Y496G

## (undated) DEVICE — INFUSER BAG 3000ML

## (undated) DEVICE — ENDOPATH XCEL BLADELESS TROCARS WITH STABILITY SLEEVES: Brand: ENDOPATH XCEL

## (undated) DEVICE — SPECIMEN CONTAINER STERILE PEEL PACK

## (undated) DEVICE — ACE WRAP 6 IN STERILE

## (undated) DEVICE — ENDOPATH PNEUMONEEDLE INSUFFLATION NEEDLES WITH LUER LOCK CONNECTORS 120MM: Brand: ENDOPATH

## (undated) DEVICE — NEEDLE HYPO 2G X 1 IN

## (undated) DEVICE — PREP SURGICAL PURPREP 26ML

## (undated) DEVICE — GUIDEWIRE ENDO DREAMWIRE 0.035 X 260CM STR SS

## (undated) DEVICE — RETRIEVAL BALLOON CATHETER: Brand: EXTRACTOR™ PRO RX

## (undated) DEVICE — GUIDEWIRE STRGHT TIP 0.035 IN  SOLO PLUS

## (undated) DEVICE — LIGHT GLOVE GREEN

## (undated) DEVICE — SINGLE-USE BIOPSY FORCEPS: Brand: RADIAL JAW 4

## (undated) DEVICE — XTRASHARP MERLIN 15° GREAT WHITE, BENDABLE TO 30°, 4.2 MM: Brand: XTRASHARP MERLIN GREAT WHITE

## (undated) DEVICE — AEM CORD

## (undated) DEVICE — PADDING CAST 6IN COTTON STRL

## (undated) DEVICE — URO CATCHER BAG STERILE 0-UC32

## (undated) DEVICE — OCCLUSIVE GAUZE STRIP,3% BISMUTH TRIBROMOPHENATE IN PETROLATUM BLEND: Brand: XEROFORM

## (undated) DEVICE — 3M™ STERI-STRIP™ REINFORCED ADHESIVE SKIN CLOSURES, R1547, 1/2 IN X 4 IN (12 MM X 100 MM), 6 STRIPS/ENVELOPE: Brand: 3M™ STERI-STRIP™

## (undated) DEVICE — Device: Brand: LEVEL 1

## (undated) DEVICE — TRAY FOLEY 16FR URIMETER SURESTEP

## (undated) DEVICE — UNIVERSAL  MINOR EXTREMITY PK: Brand: CARDINAL HEALTH

## (undated) DEVICE — NEEDLE HYPO 22G X 1-1/2 IN

## (undated) DEVICE — MEDI-VAC YANK SUCT HNDL W/TPRD BULBOUS TIP: Brand: CARDINAL HEALTH

## (undated) DEVICE — SUT ETHILON 2-0 FS 18 IN 664H